# Patient Record
Sex: FEMALE | Race: WHITE | NOT HISPANIC OR LATINO | Employment: OTHER | URBAN - METROPOLITAN AREA
[De-identification: names, ages, dates, MRNs, and addresses within clinical notes are randomized per-mention and may not be internally consistent; named-entity substitution may affect disease eponyms.]

---

## 2016-02-25 LAB
CREAT SERPL-MCNC: 0.9 MG/DL (ref 0.6–1.1)
GFR SERPL CREATININE-BSD FRML MDRD: >60 ML/MIN/1.73M2

## 2016-08-12 LAB
ALT SERPL-CCNC: 28 U/L (ref 7–45)
AST SERPL-CCNC: 40 U/L (ref 8–43)
CREAT SERPL-MCNC: 0.8 MG/DL (ref 0.6–1.1)
GFR SERPL CREATININE-BSD FRML MDRD: >60 ML/MIN/1.73M2
HEP C HIM: NORMAL

## 2017-01-03 ENCOUNTER — TRANSFERRED RECORDS (OUTPATIENT)
Dept: HEALTH INFORMATION MANAGEMENT | Facility: CLINIC | Age: 57
End: 2017-01-03

## 2017-01-04 DIAGNOSIS — J84.9 ILD (INTERSTITIAL LUNG DISEASE) (H): Primary | ICD-10-CM

## 2017-08-31 ENCOUNTER — TRANSFERRED RECORDS (OUTPATIENT)
Dept: HEALTH INFORMATION MANAGEMENT | Facility: CLINIC | Age: 57
End: 2017-08-31

## 2017-08-31 LAB — EJECTION FRACTION: 68

## 2017-09-01 ENCOUNTER — TRANSFERRED RECORDS (OUTPATIENT)
Dept: HEALTH INFORMATION MANAGEMENT | Facility: CLINIC | Age: 57
End: 2017-09-01

## 2017-09-01 LAB
ALT SERPL-CCNC: 20 U/L (ref 7–45)
AST SERPL-CCNC: 28 U/L (ref 8–43)
CREAT SERPL-MCNC: 0.9 MG/DL (ref 0.6–1.1)
GFR SERPL CREATININE-BSD FRML MDRD: >60 ML/MIN/1.73M2
POTASSIUM SERPL-SCNC: 4.7 MMOL/L (ref 3.6–5.2)

## 2017-09-17 ENCOUNTER — TRANSFERRED RECORDS (OUTPATIENT)
Dept: HEALTH INFORMATION MANAGEMENT | Facility: CLINIC | Age: 57
End: 2017-09-17

## 2017-11-07 ENCOUNTER — TRANSFERRED RECORDS (OUTPATIENT)
Dept: HEALTH INFORMATION MANAGEMENT | Facility: CLINIC | Age: 57
End: 2017-11-07

## 2017-11-07 LAB
ALT SERPL-CCNC: 19 U/L (ref 7–45)
AST SERPL-CCNC: 28 U/L (ref 8–43)
CREAT SERPL-MCNC: 0.9 MG/DL (ref 0.6–1.1)
GFR SERPL CREATININE-BSD FRML MDRD: >60 ML/MIN/1.73M2
POTASSIUM SERPL-SCNC: 4.2 MMOL/L (ref 3.6–5.2)

## 2017-11-14 ENCOUNTER — TELEPHONE (OUTPATIENT)
Dept: TRANSPLANT | Facility: CLINIC | Age: 57
End: 2017-11-14

## 2017-11-14 ENCOUNTER — TRANSFERRED RECORDS (OUTPATIENT)
Dept: HEALTH INFORMATION MANAGEMENT | Facility: CLINIC | Age: 57
End: 2017-11-14

## 2017-11-14 NOTE — LETTER
November 14, 2017    Kecia Blue  [address] [place] [address]    Dear Kecia,   Thank you for your interest in the Transplant Center at [facility], [facility].  We look forward to being a part of your care team and assisting you through the transplant process.  As we discussed, your transplant coordinator is Camilla Antoine.  You may call your coordinator at any time with questions or concerns at [phone].  Please complete the following items.    1.  Sign up for:    [employer] (your electronic medical record)    [employer] (the Transplant Center s website- see enclosed booklet for more information)    The above tools can be used to learn more about your transplant, communicate with your care team and track your medical details.    2.  Fill out and return the following enclosed forms:    Transplant application    Authorization for Electronic Communication    Authorization to Discuss Protected Health Information    [employer] Release of Information    Best Wishes,     Solid Organ Transplant Intake  [facility], [facility]    Cc:  Dr. Rosy Mccann

## 2017-11-14 NOTE — LETTER
November 20, 2017    Kecia Blue  [address] [place]  [place] [address]    Fax: [phone]//Attn: Medical Records  Re: Kecia Blue    MRN: [id number]     [facility],   Our mutual patient, Kecia Blue has been referred for a lung transplant at the [facility].  In order to provide the best possible recommendations for this patient, we require some medical records as listed on the attached page.    All information needs to arrive at our facility by 11/23/2017.  If you have a PACS-to PACS connection, we request you push images to the [facility].  Please indicate if you have pushed images to us on your return cover letter.  If unable, scans may be burned onto a CD in DICOM format. All scans may be burned onto a CD in DICOM format.  Pathology slides, if requested,  should be accompanied by the appropriate report from your institution.    Please fax all paper records to [phone].    Please send all scans/slides to:   [facility]  Solid Organ Transplant Office  [address] [place] [address], [address] [address]    Please call our office at [phone] if you have any questions or concerns.  Please call or fax if the patient is hand carrying any information, or if any of the requested information is not at your facility.  Thank you for your assistance in caring for this patient!     Sincerely,   [facility]     Lung Transplant TeamRequest for Records from Primary Care Providers Office for Patients Referred to [facility] Lung Transplant Program  Images Needed to Process Intake of Patient:  o CXR Images (most recent)  o Chest CT Images (all within last 24 months or most recent)  o Heart Cath Images (most recent)  Records Needed to Process Intake of Patient:   o Chest CT Report (all within last 24 months or most recent)  o Chest X-Ray Report (all within last 24  months or most recent)  o Colonoscopy Results with Pathology  o Consulting Physician History and Physical (last 3 reports on file)  o Culture Results (last 2 years on file)  o DEXA Scan Results (most recent on file)  o ECHO Results (most recent on file)  o Hospital Discharge Summaries (last 2 years on file)  o Immunization Records (most recent on file)  o Lab Results (most recent on file)  o Original History and Physical   o Physician Notes (last 3 reports on file)  o Pulmonary Function Testing Results (last 3 reports on file)  o Radiology Reports not including Chest X-rays (last 2 years on file)  o Rheumatology Provider Notes (original and most recent on file)  As applicable:   o Gynecological Exam (most recent on file)  o Mammogram Results (most recent on file)  o Pap Smear Results (most recent on file)  o PSA Lab Results (most recent on file)    Please fax all paper records to [phone].    Please send all scans/slides to:   [facility]  Solid Organ Transplant Office  [address] [place] [id number]  Please call our office at [phone] if you have any questions or concerns.

## 2017-11-14 NOTE — LETTER
November 20, 2017    Kecia Blue  [address] [place]  [place] [address]    Fax: [phone]//Attn: Medical Records  Re: Kecia Blue    MRN: [id number]     [facility],   Our mutual patient, Kecia Blue has been referred for a lung transplant at the [facility].  In order to provide the best possible recommendations for this patient, we require some medical records as listed on the attached page.    All information needs to arrive at our facility by 11/23/2017.  If you have a PACS-to PACS connection, we request you push images to the [facility].  Please indicate if you have pushed images to us on your return cover letter.  If unable, scans may be burned onto a CD in DICOM format. All scans may be burned onto a CD in DICOM format.  Pathology slides, if requested,  should be accompanied by the appropriate report from your institution.    Please fax all paper records to [phone].    Please send all scans/slides to:   [facility]  Solid Organ Transplant Office  [address] [place] [address]    Please call our office at [phone] if you have any questions or concerns.  Please call or fax if the patient is hand carrying any information, or if any of the requested information is not at your facility.  Thank you for your assistance in caring for this patient!     Sincerely,   [facility]     Lung Transplant TeamRequest for Records from Referring Providers Office for Patients Referred to [facility] Lung Transplant Program  Images Needed to Process Intake of Patient:  o CXR Images (most recent)  o Chest CT Images (all within last 24 months or most recent)  o Heart Cath Images (most recent)  Records Needed to Process Intake of Patient:   o Any Alpha 1 Level and Typing available  o Cardiac Catheterization Results (most recent on file)  o Any Cystic Fibrosis  Genotyping available  o Chest CT Report (all within last 24 months or most recent)  o Chest X-Ray Report (all within last 24 months or most recent)  o Culture Results (last 2 years on file)  o ECHO Results (most recent on file)  o Hospital Discharge Summaries (last 2 years on file)  o Lab Results (most recent on file)  o History and Physical (original on file)  o Any Pathology Reports available  o Physicians Notes (last 3 reports on file)  o Pulmonary Function Test Results (last 3 reports on file)  o Pulmonary Testing (last 2 years on file)  o Radiology Reports (not including Chest X-ray, last 2 years on file)  o Rheumatology Notes (original note and most recent note on file)  o Six Minute Walk Results (most recent on file)    Please fax all paper records to [phone].    Please send all scans/slides to:   [facility]  Solid Organ Transplant Office  [address] [place] [address], [address] [address]  Please call our office at [phone] if you have any questions or concerns.

## 2017-11-14 NOTE — LETTER
November 24, 2017    Kecia Blue  [address] [place] [facility] [facility] [address]    Fax: [phone]//Attn: Medical Records- Pathology  Re: Kecia Blue    MRN: [id number]     [facility],   Our mutual patient, Kecia Blue has been referred by NP Elsy Mccann for a lung transplant at the [facility].  In order to provide the best possible recommendations for this patient, we require some medical records as listed below:    Colonoscopy Report from Dec 2012  All information needs to arrive at our facility by 11/27/2017.  If you have a PACS-to PACS connection, we request you push images to the [facility].  Please indicate if you have pushed images to us on your return cover letter.  If unable, scans may be burned onto a CD in DICOM format.  Pathology slides, if requested, should be accompanied by the appropriate report from your institution.    Please fax all paper records to [phone].    Please send all scans/slides to:   [facility]  Solid Organ Transplant Office  [address] [place] [address], [address] [address]    Please call our office at [phone] if you have any questions or concerns.  Please call or fax if the patient is hand carrying any information, or if any of the requested information is not at your facility.  Thank you for your assistance in caring for this patient!     Sincerely,   [facility]   Lung Transplant Team

## 2017-11-14 NOTE — LETTER
November 20, 2017    Elsy Mccann NP  [address] [place] [address]    RE:  Kecia Blue          1960      Dear DOMINIC Mccann,    We are writing to inform you that Kecia Blue has completed the referral process with us to be evaluated for a lung transplant.    Their assigned Transplant Coordinator is Camilla Antoine.  If you should have any questions or concerns, please don t hesitate to contact us.        Thank you,       Solid Organ Transplant Department  [address]  [address], [address] [address]  Phone:  [phone]

## 2017-11-20 VITALS — HEIGHT: 64 IN | BODY MASS INDEX: 19.63 KG/M2 | WEIGHT: 115 LBS

## 2017-11-20 NOTE — TELEPHONE ENCOUNTER
Intake Progress Note    Organ: Lung    Initial Call Made by: Records received from [place]    Referring Physician: DOMINIC Mccann    Assigned Coordinator: Camilla Antoine    Reported Diagnosis: ILD    On Dialysis: No    Dialysis Schedule: N/A    Reported Medical History: ILD. No hospitalizations in the past 12 months per patient.    Records:  I will request.     Preferred Evaluation Start Date:  ASAP     Online Forms    Best time patient can be reached: Anytime. Call her Cell first.    Type of packet sent:  Lung packet has been sent and confirmed patient received it.    Misc. Notes: May speak with emergency contacts listed: Y    Verbal Consent: Y    Insurance: [employer] ID # [id number] (on file in Epic)

## 2017-11-21 NOTE — TELEPHONE ENCOUNTER
SOT LUNG INTAKE    November 21, 2017    Kecia Blue  [id number]  Body mass index is 19.74 kg/(m^2).  Referring Provider: Dr. Dee Watkins (Rheumatology), Dr. Sandra Mclaughlin (Pulmonary Study) Dr. Ra Noyola (Cardiology), Elsy Mccann, NP, Dr. 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    Allergies were updated    Primary Care  Mammogram: [facility]  11/2016  PAP: [facility]  2017   Colonoscopy: [facility]  12/2012  Dental: had a dental visit in the past 1 yr, had braces removed in Oct, no other issues stated   Hepatitis A/B: none  Pneumovax: updated in Epic    Pulmonary Tests   PFTs: [facility]  9/19/17 (pg 22/ 51)  6 Minute Walk: [facility]  9/19/17 as stated   Sleep Study: none    Diagnostic Tests/Imaging  Heart cath: [facility] as noted in [facility] in 3/2013  Stress Test: none  ECHO: [facility]  8/31/17 (pg 38/ 51)    Chest CT: [facility]  9/19/17 (pg 48/ 51)  DEXA: [facility]  2015  Upper GI: none    Notes: Lung Biopsy at [facility] 1/15/16 (pg 46/ 51)    Contacted pt, is part of a research study for PH at [facility] for 16 wks ending in January.  She uses O2 5L continuously, as noted uses a scooter to get around.  As noted, plans to do a repeat CT to rule out infection and discussions on initiating therapy with cyclophosphamide, start pulmonary rehab and monitor weight.

## 2017-12-18 NOTE — TELEPHONE ENCOUNTER
"SOT LUNG INTAKE    December 18, 2017    Kecia lBue  [id number]  Referring Provider: Elsy Mccann, NP, [facility]; Dee Watkins, Rheumatology  Source/Facility: [facility] records, PCP, patient     Diagnosis: Dermatomyositis  57 year old    Height: 5'4\"  Weight: 115 lbs  BMI: 19.74    Social History:    Social History     Social History     Marital status:      Spouse name: N/A     Number of children: N/A     Years of education: N/A     Occupational History     Not on file.     Social History Main Topics     Smoking status: Never Smoker     Smokeless tobacco: Never Used     Alcohol use 0.6 oz/week     1 Glasses of wine per week      Comment: 1 glass 3 times weekly     Drug use: No     Sexual activity: Not on file     Other Topics Concern     Not on file     Social History Narrative       Second-hand Smoke exposure: as a child until age 20    Family History:    Family History   Problem Relation Age of Onset     Hypertension Mother      Arthritis Mother      Pancreatic Cancer Father      DIABETES Father        Past Medical History  Pulmonary Manifestations date: 2014   Details: developed muscle pain/weakness around Sept 2014. Palpable rash and hand cracking at that time, progressive SOB and dry cough a couple months later.  Elevated muscle enzymes and Felisa-1 bette, diagnosed with dermatomyositis.  Started prednisone 50 and tapered, Cellcept to 2 gm daily.  Switched to Azathioprine Feb 2015, up to 150 mg daily.     Diabetes: no  Coronary Artery Disease: unknown  Hypertension: no   Previous transfusion(s): no  History of Cancer: no   GERD: no symptoms  Bleeding Disorders: mother on coumadin for PE over 3 yrs ago    Other Past Medical History:      Past Medical History:   Diagnosis Date     Antisynthetase syndrome (H) 2014     Dermatomyositis (H)      ILD (interstitial lung disease) (H)      Pulmonary hypertension        Surgical History   Lung Biopsy: transbronchial biopsy only: 10/20/14 reviewed by [facility] 1/15/16: " fragments of benign bronchial mucosa and adjacent alveolated lung parenchyma with diffuse interstitial inflammatory infiltrates composed of lymphocytes, neutrophils and a few eosinophils.  Reactive type II pneumocyte hyperplasia also present.    Pneumothorax: no  Chest Surgery: no    Past Surgical History:   Procedure Laterality Date     no prior surgery         Mental Health History  Depression: denies problems, is determined to accept and work with her diagnosis  Anxiety:    Other:      Medications  No current outpatient prescriptions on file.     No current facility-administered medications for this visit.      Clinical trial at [facility]: treprostinil vs placebo  Blood thinner hx: none  Prednisone hx: current  Antibiotic hx:    Narcotic hx: no    Allergies  Review of patient's allergies indicates no known allergies.      Pulmonary Tests and Status  PFT's  Date: 11/07/17    FVC 0.89, 28%   FEV1  0.81, 31%  DLCO  Not obtained  Date: 2/25/2016   FVC 1.49, 46%   FEV1  1.25, 48%  DLCO 29%, TLC 2.12, 44%    6 Minute Walk: 11/07/17: titrated to 6 liters with exertion.  Currently portable is an Inogen with pulsed dose oxygen.  When she goes out to family she brings her primary concentrator along.     Oxygen use   At rest: 4 changed to 5 after testing 11/2017   Sleep: 4   With Activity: 4.5-5 changed to 6 after testing 11/2017   Date of O2 initiation: Aug 2016    Oxygen Company: [employer]   Contact name/number: Manny Sprague    Sleep Study: o/n oximetry baseline 86%.  2/26/16 script for 4 liters with exertion.     BiPAP/CPAP: no     Pulmonary Rehab: starting in January   Date: Jan 2, 2018  Location: [place]      Current activity: minimal    Current activity:  Basic ADLs    Feeding no concerns  Toileting no concerns  Selecting proper attire no concerns  Grooming no concerns  Maintaining continence no concerns  Putting on clothing no concerns  Bathing does OK   Walking & Transferring: extremely difficult with stairs, longer  distance walking.  Mostly stays home. Does not go to basement where laundry is done.  Able to stay on main level of home, bedrooms on main level.    Instrumental ADLs    Managing Finances OK  Handling Transportation able to drive but does very little  Shopping may go along, not usually primary   Preparing meals some  Using telephone & communication devices no concerns  Managing medications no concerns  Housework & basic home maintenance: has       Hospitalizations in prior 12 months  none      Diagnostic Tests/Imaging  Heart cath: 3/2013? RH cath at [facility] (per notes) RA 3, PA 40/13, m25, W 10, [place] 4.9, CI 3.0, PVR 3.06   Stress Test: no   ECHO: 8/31/17: per notes: normal RV size with est RVSP 59. Normal LV size with EF 68%, compared to 8/2016, RVSP has increased    Chest CT: [facility]   9/19/17: new bilateral ggo and interlobular septal thickening could be due to superimposed edema on the underlying fibrotic ILD.  However this could be related to the underlying fibrotic interstitial lung disease and raises the possibility of HP in addition to NSIP and UIP as potential diagnoses.   2/25/16: reticular opacity from upper zone, predominantly in basal zone with ggo, no honeycombing.  C/w  NSIP pattern.  DEXA: no?  Upper GI: not found  Esophagram: 2/26/2016 Normal, esophageal peristalsis is intact, nor hiatal hernia or reflux  PET: 8/15/16--not done at [facility]: axillary and mediastinal venessa uptake features probably reactive.  Bilateral lung uptake features probably from chronic lung interstitial disease.    Primary Care  Health Maintenance   Topic Date Due     TETANUS IMMUNIZATION (SYSTEM ASSIGNED)  12/10/1980     HEPATITIS C SCREENING  12/10/1980     PAP SCREENING Q3 YR (SYSTEM ASSIGNED)  12/10/1983     LIPID SCREEN Q5 YR FEMALE (SYSTEM ASSIGNED)  12/10/2007     MAMMO SCREEN Q2 YR (SYSTEM ASSIGNED)  12/10/2012     COLON CANCER SCREEN (SYSTEM ASSIGNED)  12/10/2012     INFLUENZA VACCINE (SYSTEM ASSIGNED)   09/01/2017     ADVANCE DIRECTIVE PLANNING Q5 YRS  12/10/2017     Mammogram: 10/26/15: new small asymmetry in inner right breast middle depth.  Further diagnostics recommended. Further diagnostics: no radiographic evidence of malignancy.  11/29/16: breast parenchyma is heterogeneously dense.  No evidence of malignancy.  PAP: 5/15/15: dysplasia of cervix, low grade, ESTRADA-1  Colonoscopy: 12/22/12 colonoscopy negative  Dental: within year  Hepatitis A/B: no  Pneumovax: 2/24/16    Labs:    NT proBNP: 9/01/17 1351  A1AT: not found  Rheumatology: 8/22/16 notes: ESR 99,  (607), , Aldolase 13.2, CRP 35.8, BENJAMÍN, SSA, Felisa-1 all positive.   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  Will order flow loop.  6 mw will be done on Tuesday 1/02 with pulmonary rehab intake visit

## 2017-12-21 DIAGNOSIS — J84.9 ILD (INTERSTITIAL LUNG DISEASE) (H): Primary | ICD-10-CM

## 2017-12-21 NOTE — Clinical Note
Please schedule NPT with Dr Hood on 1/05 at 8:40 am (80 minutes) with spirometry prior to appt.  Appt. Held. Patient aware.  Need new patient letter.

## 2017-12-28 RX ORDER — NYSTATIN 100000/ML
100000 SUSPENSION, ORAL (FINAL DOSE FORM) ORAL 4 TIMES DAILY
Status: ON HOLD | COMMUNITY
End: 2018-03-27

## 2017-12-28 RX ORDER — CALCIUM CARBONATE 500(1250)
1 TABLET ORAL 2 TIMES DAILY
Status: ON HOLD | COMMUNITY
End: 2018-03-27

## 2017-12-28 RX ORDER — SULFAMETHOXAZOLE/TRIMETHOPRIM 800-160 MG
1 TABLET ORAL
Status: ON HOLD | COMMUNITY
End: 2018-03-23

## 2017-12-28 RX ORDER — MULTIPLE VITAMINS W/ MINERALS TAB 9MG-400MCG
1 TAB ORAL DAILY
Status: ON HOLD | COMMUNITY
End: 2018-03-27

## 2018-01-05 ENCOUNTER — OFFICE VISIT (OUTPATIENT)
Dept: TRANSPLANT | Facility: CLINIC | Age: 58
End: 2018-01-05
Attending: INTERNAL MEDICINE
Payer: COMMERCIAL

## 2018-01-05 VITALS
WEIGHT: 120.3 LBS | DIASTOLIC BLOOD PRESSURE: 79 MMHG | OXYGEN SATURATION: 93 % | HEIGHT: 64 IN | BODY MASS INDEX: 20.54 KG/M2 | HEART RATE: 95 BPM | RESPIRATION RATE: 22 BRPM | TEMPERATURE: 97.8 F | SYSTOLIC BLOOD PRESSURE: 119 MMHG

## 2018-01-05 DIAGNOSIS — J84.9 ILD (INTERSTITIAL LUNG DISEASE) (H): Primary | ICD-10-CM

## 2018-01-05 DIAGNOSIS — J84.9 ILD (INTERSTITIAL LUNG DISEASE) (H): ICD-10-CM

## 2018-01-05 DIAGNOSIS — R59.1 LA (LYMPHADENOPATHY): ICD-10-CM

## 2018-01-05 LAB
6 MIN WALK (FT): 850 FT
6 MIN WALK (M): 259 M

## 2018-01-05 PROCEDURE — G0463 HOSPITAL OUTPT CLINIC VISIT: HCPCS | Mod: ZF

## 2018-01-05 ASSESSMENT — PAIN SCALES - GENERAL: PAINLEVEL: NO PAIN (0)

## 2018-01-05 NOTE — PATIENT INSTRUCTIONS
"Pulmonary Rehab:  Proceed when scheduled    Oxygen use:  5 liters continuous      5' 4\" 120 lbs 4.8 oz Body mass index is 20.65 kg/(m^2).  Goal BMI greater than 18, less than 30 to be eligible for lung transplant    Medication changes: increase prednisone to 10 mg daily    Follow-up plans: a PET scan will be arranged for you closer to home.  Tentative plan will be to proceed with a transplant evaluation following that.   6 minute walk today, will modify your oxygen prescription if needed    Thank-you for allowing us to participate in your care.    If your condition should change please contact your transplant coordinator.     Thoracic Transplant Office phone [phone], fax [phone]  Office Hours 8:30 - 5:00       "

## 2018-01-05 NOTE — MR AVS SNAPSHOT
"              After Visit Summary   1/5/2018    Kecia Blue    MRN: [id number]           Patient Information     Date Of Birth          1960        Visit Information        Provider Department      1/5/2018 8:40 AM Nicho Hood MD [facility] Solid Organ Transplant        Today's Diagnoses     ILD (interstitial lung disease) (H)    -  1    LA (lymphadenopathy)          Care Instructions    Pulmonary Rehab:  Proceed when scheduled    Oxygen use:  5 liters continuous      5' 4\" 120 lbs 4.8 oz Body mass index is 20.65 kg/(m^2).  Goal BMI greater than 18, less than 30 to be eligible for lung transplant    Medication changes: increase prednisone to 10 mg daily    Follow-up plans: a PET scan will be arranged for you closer to home.  Tentative plan will be to proceed with a transplant evaluation following that.   6 minute walk today, will modify your oxygen prescription if needed    Thank-you for allowing us to participate in your care.    If your condition should change please contact your transplant coordinator.     Thoracic Transplant Office phone [phone], fax [phone]  Office Hours 8:30 - 5:00               Follow-ups after your visit        Additional Services     CARDIOLOGY EVAL ADULT REFERRAL       Your provider has referred you to:  [facility]: [facility] [phone]   [email or web address]    Please be aware that coverage of these services is subject to the terms and limitations of your health insurance plan.  Call member services at your health plan with any benefit or coverage questions.      Type of Referral:  New Cardiology Consult    Timeframe requested:  4 weeks    Please bring the following to your appointment:  >>   Any x-rays, CTs or MRIs which have been performed.  Contact the facility where they were done to arrange for  prior to your scheduled appointment.    >>   List of current " "medications  >>   This referral request   >>   Any documents/labs given to you for this referral                  Your next 10 appointments already scheduled     Jan 05, 2018 10:00 AM CST   Six Minute Walk with UC PFL 6 MINUTE WALK 1   [facility] Pulmonary Function Testing ([facility])    [address] [id number] [phone]              Future tests that were ordered for you today     Open Future Orders        Priority Expected Expires Ordered    General PFT Lab (Please always keep checked) Routine  1/5/2019 1/5/2018    6 minute walk test Routine  1/5/2019 1/5/2018    PET Brain Routine  1/5/2019 1/5/2018            Who to contact     If you have questions or need follow up information about today's clinic visit or your schedule please contact [facility] SOLID ORGAN TRANSPLANT directly at [phone].  Normal or non-critical lab and imaging results will be communicated to you by [employer], letter or phone within 4 business days after the clinic has received the results. If you do not hear from us within 7 days, please contact the clinic through [employer] or phone. If you have a critical or abnormal lab result, we will notify you by phone as soon as possible.  Submit refill requests through [employer] [facility] or call your pharmacy and they will forward the refill request to us. Please allow 3 business days for your refill to be completed.          Additional Information About Your Visit        [employer] Information     [employer] [facility] lets you send messages to your doctor, view your test results, renew your prescriptions, schedule appointments and more. To sign up, go to [email or web address] . Click on \"Log in\" on the left side of the screen, which will take you to the Welcome page. Then click on \"Sign up Now\" on the right side of the page.     You will be asked to enter the access code listed below, as well as some personal information. Please follow the directions to create " "your username and password.     Your access code is: [email or web address]  Expires: 2018  9:48 AM     Your access code will  in 90 days. If you need help or a new code, please call your [facility] or [phone].        Care EveryWhere ID     This is your Care EveryWhere ID. This could be used by other organizations to access your [facility] medical records  [id number]        Your Vitals Were     Pulse Temperature Respirations Height Pulse Oximetry BMI (Body Mass Index)    95 97.8  F (36.6  C) (Oral) 22 1.626 m (5' 4\") 93% 20.65 kg/m2       Blood Pressure from Last 3 Encounters:   18 119/79    Weight from Last 3 Encounters:   18 54.6 kg (120 lb 4.8 oz)   17 52.2 kg (115 lb)              We Performed the Following     CARDIOLOGY EVAL ADULT REFERRAL        Primary Care Provider Office Phone # Fax #    Rosy uV [phone]       [facility]  [address]  [facility] [address]        Equal Access to Services     [facility]: Hadii aad ku hadasho Soomaali, waaxda luqadaha, qaybta kaalmada adeegyada, waxay idiin hayaan adeeg jordi la'aan . So [facility] [phone].    ATENCIÓN: Si habla español, tiene a taylor disposición servicios gratuitos de asistencia lingüística. [facility] [phone].    We comply with applicable federal civil rights laws and [place] laws. We do not discriminate on the basis of race, color, national origin, age, disability, sex, sexual orientation, or gender identity.            Thank you!     Thank you for choosing [facility] ORGAN TRANSPLANT  for your care. Our goal is always to provide you with excellent care. Hearing back from our patients is one way we can continue to improve our services. Please take a few minutes to complete the written survey that you may receive in the mail after your visit with us. Thank you!             Your Updated Medication List - Protect others around you: Learn how to safely use, store and throw away " your medicines at www.disposemymeds.org.          This list is accurate as of: 1/5/18  9:58 AM.  Always use your most recent med list.                   Brand Name Dispense Instructions for use Diagnosis    AZATHIOPRINE PO      Take 50 mg by mouth 2 times daily 50 mg tablet, 1.5 tablets by mouth 2 times a day        calcium carbonate 1250 MG tablet    OS-CHELSEY 500 mg [place]. Ca     Take 1 tablet by mouth 2 times daily        multivitamin, therapeutic with minerals Tabs tablet      Take 1 tablet by mouth daily        nystatin 029806 UNIT/ML suspension    MYCOSTATIN     Take 100,000 Units by mouth 4 times daily 100,000 unit/ ml, take 4- 6 ml by mouth 4 times a day.        PREDNISONE PO      Take 5 mg by mouth daily        sulfamethoxazole-trimethoprim 800-160 MG per tablet    BACTRIM DS/SEPTRA DS     Take 1 tablet by mouth Every Mon, Wed, Fri Morning        TACROLIMUS PO      Take 0.1 mg by mouth 2 times daily 0.1 mg capsule, take 4 capsules by mouth 2 times a day.

## 2018-01-05 NOTE — LETTER
1/5/2018       RE: Kecia Blue  [address] [place] [address]     Dear Colleague,    Thank you for referring your patient, Kecia Blue, to the [facility] at [facility]. Please see a copy of my visit note below.          [facility]   Pulmonary Clinic Follow Up Note  January 5, 2018      Kecia Blue MRN# [id number]   Age: 57 year old YOB: 1960     Date of Consultation: January 5, 2018    Primary care provider: Rosy Vu     History taken from; Patient       Kecia Blue is a 57 year old female seen in the Pulmonary Clinic  for f/u.  Chief Complaint   Patient presents with     Transplant Referral     Lung   .          Pulmonary Problem List / Reason for follow up:   1. ILD           Assessment and Plan:   ASSESSMENT AND PLAN:  The patient is a 57-year-old lady with a history of interstitial lung disease associated with dermatomyositis.  The patient is currently on tacrolimus, Imuran and prednisone.  The patient has very advanced and progressive disease.  The patient's pulmonary function tests are worse in comparison to the one that was done 1 year ago; however, they are better than the one that was done in November.   1.  Interstitial lung disease.  The patient will continue with tacrolimus, Imuran and prednisone.  We will suggest for the patient to be on the dose of 10 mg of the prednisone.  The patient also needs to be on PCP prophylaxis with Bactrim.  The patient will go through pulmonary rehab.  Today we will do a    6-minute walk test for the patient to assess the patient's oxygen needs.  We will also refer the patient to the pulmonary hypertension specialist in our clinic.   2.  Lung transplantation.  The patient will have a PET scan prior to the evaluation because the patient had a PET scan 1 year ago which showed some lymphadenopathy in the mediastinum.   The patient has dermatomyositis and the patient has increased risk of malignancy.  We discussed with the patient evaluation and listing process as well as lung allocation score.  We also discussed with the patient dry runs and high-risk donors.  We discussed with the patient early and late complications of lung transplant.  We also discussed with the patient survival statistics.  The patient expressed understanding and agreed with the plan.  We will await results of the PET scan and then we will do the evaluation for the patient.      We explained the plan to the patient.  The patient expressed understanding and agreed with the plan.      Thank you very much for the opportunity to participate in the care of this very pleasant patient.           Return visit in 2 months  Nicho Hood M.D.         History of Present Illness / Interval History:   CHIEF COMPLAINT:  Dermatomyositis associated with interstitial lung disease.      HISTORY OF PRESENT ILLNESS:  The patient was diagnosed with dermatomyositis in 2014.  The patient was diagnosed with interstitial lung disease in 2015.  Currently the patient is short of breath with any activity.  Besides severe interstitial lung disease, the patient is diagnosed with pulmonary hypertension.  The patient has severe pulmonary hypertension.  The patient denies any other medical problems.        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  The patient is never a smoker.  The patient denies any toxins.  The patient is drinking alcohol occasionally.  The patient does not have pets and does not have a hot tub.        FAMILY HISTORY:  Positive for hypertension, pancreatic cancer and diabetes mellitus.            Pulmonary Data:   Exposure history: Asbestos;  No , TB;  No , Radiation;   No          Medications:     Current Outpatient Prescriptions   Medication Sig     AZATHIOPRINE PO Take 50 mg by mouth 2 times daily 50 mg tablet, 1.5  tablets by mouth 2 times a day     calcium carbonate (OS-CHELSEY 500 MG [place]. CA) 1250 MG tablet Take 1 tablet by mouth 2 times daily     multivitamin, therapeutic with minerals (THERA-VIT-M) TABS tablet Take 1 tablet by mouth daily     nystatin (MYCOSTATIN) 582315 UNIT/ML suspension Take 100,000 Units by mouth 4 times daily 100,000 unit/ ml, take 4- 6 ml by mouth 4 times a day.     PREDNISONE PO Take 5 mg by mouth daily     sulfamethoxazole-trimethoprim (BACTRIM DS/SEPTRA DS) 800-160 MG per tablet Take 1 tablet by mouth Every Mon, Wed, Fri Morning     TACROLIMUS PO Take 0.1 mg by mouth 2 times daily 0.1 mg capsule, take 4 capsules by mouth 2 times a day.     No current facility-administered medications for this visit.              Past Medical History:     Past Medical History:   Diagnosis Date     Antisynthetase syndrome (H) 2014     Dermatomyositis (H)      ILD (interstitial lung disease) (H)      Pulmonary hypertension              Past Surgical History:     Past Surgical History:   Procedure Laterality Date     no prior surgery               Social History:     Social History     Social History     Marital status:      Spouse name: N/A     Number of children: N/A     Years of education: N/A     Occupational History     Not on file.     Social History Main Topics     Smoking status: Never Smoker     Smokeless tobacco: Never Used     Alcohol use 0.6 oz/week     1 Glasses of wine per week      Comment: 1 glass 3 times weekly     Drug use: No     Sexual activity: Not on file     Other Topics Concern     Not on file     Social History Narrative             Family History:     Family History   Problem Relation Age of Onset     Hypertension Mother      Arthritis Mother      Pancreatic Cancer Father      DIABETES Father              Immunization:     There is no immunization history on file for this patient.           Review of Systems:   I have done 10 points of review systems and pertinent findings are as above ,  otherwise negative.             Physical Examination:   General: Alert, oriented, not in distress  B/P: 119/79, T: 97.8, P: 95, R: 22  HEENT: neck supple, symmetrical, no lymph node enlargement, thyromegaly, bruit, JVD, pupils are isocoric and equally responsive to the light.  Lungs: both hemithoraces are symmetrical, normal to palpation, no dullness to percussion, auscultation of lungs revealed bibasilar crackles  CVS: Normal S1 and S2, no additional heart sounds, murmur, rub, normal peripheral pulses  Abdomen: Bowel sounds present, soft, non tender, non distended, no organomegaly, ascitis, mass  Extremities/musculoskeletal: no peripheral edema, deformity, cyanosis, clubbing  Neurology: alert, orientedx3, no motor/sensorial deficits, cranial nerves grossly normal  Skin: no rash  Psychiatry: Mood and affect are appropriate             DATA:       PFT   PFT Latest Ref Rng & Units 1/5/2018   FVC L 1.17   FEV1 L 0.92   FVC% % 35   FEV1% % 35     Thank you for allowing me participate in the care of Kecia Blue.    Nicho Hood M.D.  Associate Professor of Medicine  Pulmonary, Allergy, Critical Care and Sleep

## 2018-01-05 NOTE — LETTER
1/5/2018      RE: Kecia Blue  [address] [place] [address]             [facility]   Pulmonary Clinic Follow Up Note  January 5, 2018      Kecia Blue MRN# [id number]   Age: 57 year old YOB: 1960     Date of Consultation: January 5, 2018    Primary care provider: Rosy Vu     History taken from; Patient       Kecia Blue is a 57 year old female seen in the Pulmonary Clinic  for f/u.  Chief Complaint   Patient presents with     Transplant Referral     Lung   .          Pulmonary Problem List / Reason for follow up:   1. ILD         Assessment and Plan:   ASSESSMENT AND PLAN:  The patient is a 57-year-old lady with a history of interstitial lung disease associated with dermatomyositis.  The patient is currently on tacrolimus, Imuran and prednisone.  The patient has very advanced and progressive disease.  The patient's pulmonary function tests are worse in comparison to the one that was done 1 year ago; however, they are better than the one that was done in November.   1.  Interstitial lung disease.  The patient will continue with tacrolimus, Imuran and prednisone.  We will suggest for the patient to be on the dose of 10 mg of the prednisone.  The patient also needs to be on PCP prophylaxis with Bactrim.  The patient will go through pulmonary rehab.  Today we will do a    6-minute walk test for the patient to assess the patient's oxygen needs.  We will also refer the patient to the pulmonary hypertension specialist in our clinic.   2.  Lung transplantation.  The patient will have a PET scan prior to the evaluation because the patient had a PET scan 1 year ago which showed some lymphadenopathy in the mediastinum.  The patient has dermatomyositis and the patient has increased risk of malignancy.  We discussed with the patient evaluation and listing process as well as lung allocation score.  We also discussed with the  patient dry runs and high-risk donors.  We discussed with the patient early and late complications of lung transplant.  We also discussed with the patient survival statistics.  The patient expressed understanding and agreed with the plan.  We will await results of the PET scan and then we will do the evaluation for the patient.      We explained the plan to the patient.  The patient expressed understanding and agreed with the plan.      Thank you very much for the opportunity to participate in the care of this very pleasant patient.   Return visit in 2 months      Nicho Hood M.D.         History of Present Illness / Interval History:   CHIEF COMPLAINT:  Dermatomyositis associated with interstitial lung disease.      HISTORY OF PRESENT ILLNESS:  The patient was diagnosed with dermatomyositis in 2014.  The patient was diagnosed with interstitial lung disease in 2015.  Currently the patient is short of breath with any activity.  Besides severe interstitial lung disease, the patient is diagnosed with pulmonary hypertension.  The patient has severe pulmonary hypertension.  The patient denies any other medical problems.        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  The patient is never a smoker.  The patient denies any toxins.  The patient is drinking alcohol occasionally.  The patient does not have pets and does not have a hot tub.        FAMILY HISTORY:  Positive for hypertension, pancreatic cancer and diabetes mellitus.          Pulmonary Data:   Exposure history: Asbestos;  No , TB;  No , Radiation;   No          Medications:     Current Outpatient Prescriptions   Medication Sig     AZATHIOPRINE PO Take 50 mg by mouth 2 times daily 50 mg tablet, 1.5 tablets by mouth 2 times a day     calcium carbonate (OS-CHELSEY 500 MG [place]. [place]) 1250 MG tablet Take 1 tablet by mouth 2 times daily     multivitamin, therapeutic with minerals (THERA-VIT-M) TABS tablet Take 1  tablet by mouth daily     nystatin (MYCOSTATIN) 631581 UNIT/ML suspension Take 100,000 Units by mouth 4 times daily 100,000 unit/ ml, take 4- 6 ml by mouth 4 times a day.     PREDNISONE PO Take 5 mg by mouth daily     sulfamethoxazole-trimethoprim (BACTRIM DS/SEPTRA DS) 800-160 MG per tablet Take 1 tablet by mouth Every Mon, Wed, Fri Morning     TACROLIMUS PO Take 0.1 mg by mouth 2 times daily 0.1 mg capsule, take 4 capsules by mouth 2 times a day.     No current facility-administered medications for this visit.              Past Medical History:     Past Medical History:   Diagnosis Date     Antisynthetase syndrome (H) 2014     Dermatomyositis (H)      ILD (interstitial lung disease) (H)      Pulmonary hypertension              Past Surgical History:     Past Surgical History:   Procedure Laterality Date     no prior surgery               Social History:     Social History     Social History     Marital status:      Spouse name: N/A     Number of children: N/A     Years of education: N/A     Occupational History     Not on file.     Social History Main Topics     Smoking status: Never Smoker     Smokeless tobacco: Never Used     Alcohol use 0.6 oz/week     1 Glasses of wine per week      Comment: 1 glass 3 times weekly     Drug use: No     Sexual activity: Not on file     Other Topics Concern     Not on file     Social History Narrative             Family History:     Family History   Problem Relation Age of Onset     Hypertension Mother      Arthritis Mother      Pancreatic Cancer Father      DIABETES Father              Immunization:   There is no immunization history on file for this patient.         Review of Systems:   I have done 10 points of review systems and pertinent findings are as above , otherwise negative.         Physical Examination:   General: Alert, oriented, not in distress  B/P: 119/79, T: 97.8, P: 95, R: 22  HEENT: neck supple, symmetrical, no lymph node enlargement, thyromegaly, bruit,  JVD, pupils are isocoric and equally responsive to the light.  Lungs: both hemithoraces are symmetrical, normal to palpation, no dullness to percussion, auscultation of lungs revealed bibasilar crackles  CVS: Normal S1 and S2, no additional heart sounds, murmur, rub, normal peripheral pulses  Abdomen: Bowel sounds present, soft, non tender, non distended, no organomegaly, ascitis, mass  Extremities/musculoskeletal: no peripheral edema, deformity, cyanosis, clubbing  Neurology: alert, orientedx3, no motor/sensorial deficits, cranial nerves grossly normal  Skin: no rash  Psychiatry: Mood and affect are appropriate          DATA:   PFT   PFT Latest Ref Rng & Units 1/5/2018   FVC L 1.17   FEV1 L 0.92   FVC% % 35   FEV1% % 35       Thank you for allowing me participate in the care of Kecia Blue.      Nicho Hood M.D.  Associate Professor of Medicine  Pulmonary, Allergy, Critical Care and Sleep

## 2018-01-05 NOTE — PROGRESS NOTES
[facility] Clinic Follow Up Note  January 5, 2018      Kecia Blue MRN# [id number]   Age: 57 year old YOB: 1960     Date of Consultation: January 5, 2018    Primary care provider: Rosy Vu     History taken from; Patient       Kecia Blue is a 57 year old female seen in the Pulmonary Clinic  for f/u.  Chief Complaint   Patient presents with     Transplant Referral     Lung   .          Pulmonary Problem List / Reason for follow up:   1. ILD           Assessment and Plan:        ASSESSMENT AND PLAN:  The patient is a 57-year-old lady with a history of interstitial lung disease associated with dermatomyositis.  The patient is currently on tacrolimus, Imuran and prednisone.  The patient has very advanced and progressive disease.  The patient's pulmonary function tests are worse in comparison to the one that was done 1 year ago; however, they are better than the one that was done in November.   1.  Interstitial lung disease.  The patient will continue with tacrolimus, Imuran and prednisone.  We will suggest for the patient to be on the dose of 10 mg of the prednisone.  The patient also needs to be on PCP prophylaxis with Bactrim.  The patient will go through pulmonary rehab.  Today we will do a    6-minute walk test for the patient to assess the patient's oxygen needs.  We will also refer the patient to the pulmonary hypertension specialist in our clinic.   2.  Lung transplantation.  The patient will have a PET scan prior to the evaluation because the patient had a PET scan 1 year ago which showed some lymphadenopathy in the mediastinum.  The patient has dermatomyositis and the patient has increased risk of malignancy.  We discussed with the patient evaluation and listing process as well as lung allocation score.  We also discussed with the patient dry runs and high-risk donors.  We discussed with the patient early and late  complications of lung transplant.  We also discussed with the patient survival statistics.  The patient expressed understanding and agreed with the plan.  We will await results of the PET scan and then we will do the evaluation for the patient.      We explained the plan to the patient.  The patient expressed understanding and agreed with the plan.      Thank you very much for the opportunity to participate in the care of this very pleasant patient.           Return visit in 2 months      Nicho Hood M.D.         History of Present Illness / Interval History:     CHIEF COMPLAINT:  Dermatomyositis associated with interstitial lung disease.      HISTORY OF PRESENT ILLNESS:  The patient was diagnosed with dermatomyositis in 2014.  The patient was diagnosed with interstitial lung disease in 2015.  Currently the patient is short of breath with any activity.  Besides severe interstitial lung disease, the patient is diagnosed with pulmonary hypertension.  The patient has severe pulmonary hypertension.  The patient denies any other medical problems.        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  The patient is never a smoker.  The patient denies any toxins.  The patient is drinking alcohol occasionally.  The patient does not have pets and does not have a hot tub.        FAMILY HISTORY:  Positive for hypertension, pancreatic cancer and diabetes mellitus.                 Pulmonary Data:       Exposure history: Asbestos;  No , TB;  No , Radiation;   No          Medications:     Current Outpatient Prescriptions   Medication Sig     AZATHIOPRINE PO Take 50 mg by mouth 2 times daily 50 mg tablet, 1.5 tablets by mouth 2 times a day     calcium carbonate (OS-CHELSEY 500 MG [place]. [place]) 1250 MG tablet Take 1 tablet by mouth 2 times daily     multivitamin, therapeutic with minerals (THERA-VIT-M) TABS tablet Take 1 tablet by mouth daily     nystatin (MYCOSTATIN) 884719 UNIT/ML  suspension Take 100,000 Units by mouth 4 times daily 100,000 unit/ ml, take 4- 6 ml by mouth 4 times a day.     PREDNISONE PO Take 5 mg by mouth daily     sulfamethoxazole-trimethoprim (BACTRIM DS/SEPTRA DS) 800-160 MG per tablet Take 1 tablet by mouth Every Mon, Wed, Fri Morning     TACROLIMUS PO Take 0.1 mg by mouth 2 times daily 0.1 mg capsule, take 4 capsules by mouth 2 times a day.     No current facility-administered medications for this visit.              Past Medical History:     Past Medical History:   Diagnosis Date     Antisynthetase syndrome (H) 2014     Dermatomyositis (H)      ILD (interstitial lung disease) (H)      Pulmonary hypertension              Past Surgical History:     Past Surgical History:   Procedure Laterality Date     no prior surgery               Social History:     Social History     Social History     Marital status:      Spouse name: N/A     Number of children: N/A     Years of education: N/A     Occupational History     Not on file.     Social History Main Topics     Smoking status: Never Smoker     Smokeless tobacco: Never Used     Alcohol use 0.6 oz/week     1 Glasses of wine per week      Comment: 1 glass 3 times weekly     Drug use: No     Sexual activity: Not on file     Other Topics Concern     Not on file     Social History Narrative             Family History:     Family History   Problem Relation Age of Onset     Hypertension Mother      Arthritis Mother      Pancreatic Cancer Father      DIABETES Father              Immunization:     There is no immunization history on file for this patient.           Review of Systems:   I have done 10 points of review systems and pertinent findings are as above , otherwise negative.             Physical Examination:   General: Alert, oriented, not in distress  B/P: 119/79, T: 97.8, P: 95, R: 22  HEENT: neck supple, symmetrical, no lymph node enlargement, thyromegaly, bruit, JVD, pupils are isocoric and equally responsive to the  light.  Lungs: both hemithoraces are symmetrical, normal to palpation, no dullness to percussion, auscultation of lungs revealed bibasilar crackles  CVS: Normal S1 and S2, no additional heart sounds, murmur, rub, normal peripheral pulses  Abdomen: Bowel sounds present, soft, non tender, non distended, no organomegaly, ascitis, mass  Extremities/musculoskeletal: no peripheral edema, deformity, cyanosis, clubbing  Neurology: alert, orientedx3, no motor/sensorial deficits, cranial nerves grossly normal  Skin: no rash  Psychiatry: Mood and affect are appropriate             DATA:       PFT   PFT Latest Ref Rng & Units 1/5/2018   FVC L 1.17   FEV1 L 0.92   FVC% % 35   FEV1% % 35             Thank you for allowing me participate in the care of Kecia BRODERICK Blue.      Nicho Hood M.D.  Associate Professor of Medicine  Pulmonary, Allergy, Critical Care and Sleep

## 2018-01-08 ENCOUNTER — TELEPHONE (OUTPATIENT)
Dept: TRANSPLANT | Facility: CLINIC | Age: 58
End: 2018-01-08

## 2018-01-08 DIAGNOSIS — R59.0 LYMPHADENOPATHY, MEDIASTINAL: ICD-10-CM

## 2018-01-08 DIAGNOSIS — R09.02 HYPOXIA: ICD-10-CM

## 2018-01-08 DIAGNOSIS — M33.91: ICD-10-CM

## 2018-01-08 DIAGNOSIS — J84.9 ILD (INTERSTITIAL LUNG DISEASE) (H): Primary | ICD-10-CM

## 2018-01-09 LAB
DLCOUNC-%PRED-PRE: 27 %
DLCOUNC-PRE: 6.09 ML/MIN/MMHG
DLCOUNC-PRED: 21.97 ML/MIN/MMHG
ERV-%PRED-PRE: 45 %
ERV-PRE: 0.48 L
ERV-PRED: 1.06 L
EXPTIME-PRE: 6.11 SEC
FEF2575-%PRED-PRE: 32 %
FEF2575-PRE: 0.81 L/SEC
FEF2575-PRED: 2.47 L/SEC
FEFMAX-%PRED-PRE: 53 %
FEFMAX-PRE: 3.45 L/SEC
FEFMAX-PRED: 6.46 L/SEC
FEV1-%PRED-PRE: 35 %
FEV1-PRE: 0.92 L
FEV1FEV6-PRE: 79 %
FEV1FEV6-PRED: 81 %
FEV1FVC-PRE: 79 %
FEV1FVC-PRED: 80 %
FEV1SVC-PRE: 61 %
FEV1SVC-PRED: 78 %
FIFMAX-PRE: 3.46 L/SEC
FIO2-PRE: 80 %
FRCPLETH-%PRED-PRE: 57 %
FRCPLETH-PRE: 1.53 L
FRCPLETH-PRED: 2.67 L
FVC-%PRED-PRE: 35 %
FVC-PRE: 1.17 L
FVC-PRED: 3.26 L
IC-%PRED-PRE: 46 %
IC-PRE: 1.04 L
IC-PRED: 2.25 L
RVPLETH-%PRED-PRE: 58 %
RVPLETH-PRE: 1.05 L
RVPLETH-PRED: 1.8 L
TLCPLETH-%PRED-PRE: 52 %
TLCPLETH-PRE: 2.57 L
TLCPLETH-PRED: 4.86 L
VA-%PRED-PRE: 54 %
VA-PRE: 2.7 L
VC-%PRED-PRE: 45 %
VC-PRE: 1.51 L
VC-PRED: 3.31 L

## 2018-01-12 NOTE — TELEPHONE ENCOUNTER
"Have completed faxing updated orders for oxygen to [facility] in [place].    Contacted PA department for auth for PET scan.  Order completed and faxed to [facility].  PA information included.  Contacted Kecia to confirm that order has been sent to [facility] and they should contact her.  Also confirmed that approval has been obtained to proceed with evaluation when appropriate.     Kecia was seen at [place].  She was on Tyvaso vs placebo clinical trial and has now been switched to Tyvaso on Wednesday.  She is fairly sure that she was on placebo based on how it \"tastes\".  She is ramping the dosing up as was instructed.  She is a list of potential side effects for which she should call the study center and will monitor closely.   "

## 2018-01-16 ENCOUNTER — TRANSFERRED RECORDS (OUTPATIENT)
Dept: HEALTH INFORMATION MANAGEMENT | Facility: CLINIC | Age: 58
End: 2018-01-16

## 2018-01-29 ENCOUNTER — HOSPITAL ENCOUNTER (OUTPATIENT)
Facility: CLINIC | Age: 58
End: 2018-01-29
Attending: INTERNAL MEDICINE | Admitting: INTERNAL MEDICINE

## 2018-01-29 DIAGNOSIS — R06.02 SHORTNESS OF BREATH: Primary | ICD-10-CM

## 2018-01-29 DIAGNOSIS — J84.9 LUNG DISEASE, INTERSTITIAL (H): ICD-10-CM

## 2018-01-29 DIAGNOSIS — R93.89 ABNORMAL CHEST CT: ICD-10-CM

## 2018-01-29 DIAGNOSIS — Z76.82 ORGAN TRANSPLANT CANDIDATE: ICD-10-CM

## 2018-01-29 NOTE — Clinical Note
Hi, Per further discussion with Dr. Hood, just added a hi res CT chest to orders for eval, no contrast. Thanks, Camilla

## 2018-01-29 NOTE — PROGRESS NOTES
Obtained results of CT PET done at [facility], images pushed and received.  Reviewed report and images with PET radiologist at [facility].  Report states increased FDG uptake at left base of tongue, soft palate, and right aspect of the thyroid cartilage with no associated mass noted on CT, possibly physiologic.  Radiologist thought looked physiologic, low likelihood of problem, but ENT could assess with direct visualization.   Per review with Dr. Hood, will proceed with lung transplant evaluation and include an ENT consult.    Discussed with Kecia and confirmed plan for evaluation the week of 2/19.  Orders completed and sent to .

## 2018-01-29 NOTE — Clinical Note
Orders for evaluation the week of 2/19 with Dr. Hood.  No MD visit for now.  No CT.  Added an ENT referral with explanation.  Some mildly abnormal findings on outside CT PET, will scan in report.  Images here.   Let me know if questions.

## 2018-02-13 NOTE — TELEPHONE ENCOUNTER
APPT INFO    Date /Time: 2/20/18, 3pm   Reason for Appt: Lung Eval   Ref Provider/Clinic: KENDRA ALONZO   Are there internal records? Yes/No?  IF YES, list clinic names: No    Are there outside records? Yes/No? Yes    Patient Contact (Y/N) & Call Details: No, referred    Action:      OUTSIDE RECORDS CHECKLIST     CLINIC NAME COMMENTS REC (x) IMG (x)   [facility] Records scanned in epic.

## 2018-02-19 ENCOUNTER — RESULTS ONLY (OUTPATIENT)
Dept: OTHER | Facility: CLINIC | Age: 58
End: 2018-02-19

## 2018-02-19 ENCOUNTER — RADIANT APPOINTMENT (OUTPATIENT)
Dept: GENERAL RADIOLOGY | Facility: CLINIC | Age: 58
End: 2018-02-19
Attending: INTERNAL MEDICINE
Payer: COMMERCIAL

## 2018-02-19 ENCOUNTER — RADIANT APPOINTMENT (OUTPATIENT)
Dept: BONE DENSITY | Facility: CLINIC | Age: 58
End: 2018-02-19
Attending: INTERNAL MEDICINE
Payer: COMMERCIAL

## 2018-02-19 ENCOUNTER — HOSPITAL ENCOUNTER (OUTPATIENT)
Dept: CARDIOLOGY | Facility: CLINIC | Age: 58
Discharge: HOME | End: 2018-02-19
Attending: INTERNAL MEDICINE | Admitting: INTERNAL MEDICINE
Payer: COMMERCIAL

## 2018-02-19 ENCOUNTER — HOSPITAL ENCOUNTER (OUTPATIENT)
Dept: NUCLEAR MEDICINE | Facility: CLINIC | Age: 58
Setting detail: NUCLEAR MEDICINE
Discharge: HOME | End: 2018-02-19
Attending: INTERNAL MEDICINE | Admitting: INTERNAL MEDICINE
Payer: COMMERCIAL

## 2018-02-19 DIAGNOSIS — Z76.82 ORGAN TRANSPLANT CANDIDATE: ICD-10-CM

## 2018-02-19 DIAGNOSIS — J84.9 LUNG DISEASE, INTERSTITIAL (H): ICD-10-CM

## 2018-02-19 DIAGNOSIS — R06.02 SHORTNESS OF BREATH: ICD-10-CM

## 2018-02-19 LAB
ABO + RH BLD: NORMAL
ALBUMIN SERPL-MCNC: 3.4 G/DL (ref 3.4–5)
ALBUMIN UR-MCNC: 30 MG/DL
ALP SERPL-CCNC: 60 U/L (ref 40–150)
ALT SERPL W P-5'-P-CCNC: 24 U/L (ref 0–50)
AMYLASE SERPL-CCNC: 58 U/L (ref 30–110)
ANION GAP SERPL CALCULATED.3IONS-SCNC: 7 MMOL/L (ref 3–14)
APPEARANCE UR: ABNORMAL
APTT PPP: 27 SEC (ref 22–37)
AST SERPL W P-5'-P-CCNC: 27 U/L (ref 0–45)
BACTERIA #/AREA URNS HPF: ABNORMAL /HPF
BASE EXCESS BLDV CALC-SCNC: 1.5 MMOL/L
BASOPHILS # BLD AUTO: 0.1 10E9/L (ref 0–0.2)
BASOPHILS NFR BLD AUTO: 0.6 %
BILIRUB SERPL-MCNC: 0.6 MG/DL (ref 0.2–1.3)
BILIRUB UR QL STRIP: NEGATIVE
BLD GP AB SCN SERPL QL: NORMAL
BLD GP AB SCN TITR SERPL: NORMAL {TITER}
BLOOD BANK CMNT PATIENT-IMP: NORMAL
BLOOD BANK CMNT PATIENT-IMP: NORMAL
BUN SERPL-MCNC: 20 MG/DL (ref 7–30)
CALCIUM SERPL-MCNC: 8.9 MG/DL (ref 8.5–10.1)
CHLORIDE SERPL-SCNC: 106 MMOL/L (ref 94–109)
CHOLEST SERPL-MCNC: 168 MG/DL
CMV IGG SERPL QL IA: >8 AI (ref 0–0.8)
CO2 SERPL-SCNC: 28 MMOL/L (ref 20–32)
COLOR UR AUTO: YELLOW
CREAT SERPL-MCNC: 1.02 MG/DL (ref 0.52–1.04)
DEPRECATED CALCIDIOL+CALCIFEROL SERPL-MC: 55 UG/L (ref 20–75)
DIFFERENTIAL METHOD BLD: ABNORMAL
EBV VCA IGG SER QL IA: >8 AI (ref 0–0.8)
EOSINOPHIL # BLD AUTO: 0.3 10E9/L (ref 0–0.7)
EOSINOPHIL NFR BLD AUTO: 2.1 %
ERYTHROCYTE [DISTWIDTH] IN BLOOD BY AUTOMATED COUNT: 14.8 % (ref 10–15)
GFR SERPL CREATININE-BSD FRML MDRD: 56 ML/MIN/1.7M2
GLUCOSE SERPL-MCNC: 90 MG/DL (ref 70–99)
GLUCOSE UR STRIP-MCNC: NEGATIVE MG/DL
HAV IGG SER QL IA: REACTIVE
HBV CORE AB SERPL QL IA: NONREACTIVE
HBV SURFACE AB SERPL IA-ACNC: 0.64 M[IU]/ML
HBV SURFACE AG SERPL QL IA: NONREACTIVE
HCO3 BLDV-SCNC: 28 MMOL/L (ref 21–28)
HCT VFR BLD AUTO: 42.8 % (ref 35–47)
HCV AB SERPL QL IA: NONREACTIVE
HDLC SERPL-MCNC: 48 MG/DL
HGB BLD-MCNC: 13.9 G/DL (ref 11.7–15.7)
HGB UR QL STRIP: ABNORMAL
HIV 1+2 AB+HIV1 P24 AG SERPL QL IA: NONREACTIVE
HSV1 IGG SERPL QL IA: >8 AI (ref 0–0.8)
HSV2 IGG SERPL QL IA: <0.2 AI (ref 0–0.8)
HYALINE CASTS #/AREA URNS LPF: 7 /LPF (ref 0–2)
IGA SERPL-MCNC: 425 MG/DL (ref 70–380)
IGM SERPL-MCNC: 502 MG/DL (ref 60–265)
IMM GRANULOCYTES # BLD: 0 10E9/L (ref 0–0.4)
IMM GRANULOCYTES NFR BLD: 0.3 %
INR PPP: 1.01 (ref 0.86–1.14)
KETONES UR STRIP-MCNC: NEGATIVE MG/DL
LDLC SERPL CALC-MCNC: 97 MG/DL
LEUKOCYTE ESTERASE UR QL STRIP: ABNORMAL
LYMPHOCYTES # BLD AUTO: 1 10E9/L (ref 0.8–5.3)
LYMPHOCYTES NFR BLD AUTO: 8.4 %
MAGNESIUM SERPL-MCNC: 1.5 MG/DL (ref 1.6–2.3)
MCH RBC QN AUTO: 33.8 PG (ref 26.5–33)
MCHC RBC AUTO-ENTMCNC: 32.5 G/DL (ref 31.5–36.5)
MCV RBC AUTO: 104 FL (ref 78–100)
MONOCYTES # BLD AUTO: 0.9 10E9/L (ref 0–1.3)
MONOCYTES NFR BLD AUTO: 7.5 %
MUCOUS THREADS #/AREA URNS LPF: PRESENT /LPF
NEUTROPHILS # BLD AUTO: 9.7 10E9/L (ref 1.6–8.3)
NEUTROPHILS NFR BLD AUTO: 81.1 %
NITRATE UR QL: NEGATIVE
NONHDLC SERPL-MCNC: 120 MG/DL
NRBC # BLD AUTO: 0 10*3/UL
NRBC BLD AUTO-RTO: 0 /100
O2/TOTAL GAS SETTING VFR VENT: ABNORMAL %
OXYHGB MFR BLDV: 16 %
PCO2 BLDV: 51 MM HG (ref 40–50)
PH BLDV: 7.35 PH (ref 7.32–7.43)
PH UR STRIP: 5 PH (ref 5–7)
PHOSPHATE SERPL-MCNC: 3.2 MG/DL (ref 2.5–4.5)
PLATELET # BLD AUTO: 349 10E9/L (ref 150–450)
PO2 BLDV: 16 MM HG (ref 25–47)
POTASSIUM SERPL-SCNC: 4 MMOL/L (ref 3.4–5.3)
PREALB SERPL IA-MCNC: 26 MG/DL (ref 15–45)
PROT SERPL-MCNC: 8.1 G/DL (ref 6.8–8.8)
RBC # BLD AUTO: 4.11 10E12/L (ref 3.8–5.2)
RBC #/AREA URNS AUTO: 1 /HPF (ref 0–2)
SODIUM SERPL-SCNC: 141 MMOL/L (ref 133–144)
SOURCE: ABNORMAL
SP GR UR STRIP: 1.02 (ref 1–1.03)
SPECIMEN EXP DATE BLD: NORMAL
SPECIMEN EXP DATE BLD: NORMAL
SQUAMOUS #/AREA URNS AUTO: 3 /HPF (ref 0–1)
T GONDII IGG SER QL: <3 IU/ML (ref 0–7.1)
TRANS CELLS #/AREA URNS HPF: <1 /HPF
TRANSFERRIN SERPL-MCNC: 207 MG/DL (ref 210–360)
TRIGL SERPL-MCNC: 115 MG/DL
TSH SERPL DL<=0.005 MIU/L-ACNC: 3.07 MU/L (ref 0.4–4)
UROBILINOGEN UR STRIP-MCNC: 0 MG/DL (ref 0–2)
VZV IGG SER QL IA: 3.8 AI (ref 0–0.8)
WBC # BLD AUTO: 11.9 10E9/L (ref 4–11)
WBC #/AREA URNS AUTO: 5 /HPF (ref 0–2)

## 2018-02-19 PROCEDURE — 34300033 ZZH RX 343: Performed by: INTERNAL MEDICINE

## 2018-02-19 PROCEDURE — 78580 LUNG PERFUSION IMAGING: CPT

## 2018-02-19 PROCEDURE — 40000264 ECHO COMPLETE BUBBLE

## 2018-02-19 PROCEDURE — 93306 TTE W/DOPPLER COMPLETE: CPT | Mod: 26 | Performed by: INTERNAL MEDICINE

## 2018-02-19 PROCEDURE — A9540 TC99M MAA: HCPCS | Performed by: INTERNAL MEDICINE

## 2018-02-19 RX ORDER — ACYCLOVIR 200 MG/1
12 CAPSULE ORAL ONCE
Status: DISCONTINUED | OUTPATIENT
Start: 2018-02-19 | End: 2018-02-20 | Stop reason: HOSPADM

## 2018-02-19 RX ADMIN — Medication 6 MILLICURIE: at 13:50

## 2018-02-20 ENCOUNTER — ALLIED HEALTH/NURSE VISIT (OUTPATIENT)
Dept: TRANSPLANT | Facility: CLINIC | Age: 58
End: 2018-02-20
Attending: INTERNAL MEDICINE
Payer: COMMERCIAL

## 2018-02-20 ENCOUNTER — PRE VISIT (OUTPATIENT)
Dept: CARDIOLOGY | Facility: CLINIC | Age: 58
End: 2018-02-20

## 2018-02-20 VITALS — BODY MASS INDEX: 19.5 KG/M2 | WEIGHT: 113.6 LBS

## 2018-02-20 DIAGNOSIS — Z76.82 ORGAN TRANSPLANT CANDIDATE: ICD-10-CM

## 2018-02-20 DIAGNOSIS — J84.9 ILD (INTERSTITIAL LUNG DISEASE) (H): Primary | ICD-10-CM

## 2018-02-20 DIAGNOSIS — J84.9 LUNG DISEASE, INTERSTITIAL (H): ICD-10-CM

## 2018-02-20 DIAGNOSIS — Z76.82 ORGAN TRANSPLANT CANDIDATE: Primary | ICD-10-CM

## 2018-02-20 DIAGNOSIS — Z51.81 ENCOUNTER FOR THERAPEUTIC DRUG MONITORING: Primary | ICD-10-CM

## 2018-02-20 DIAGNOSIS — M33.91: ICD-10-CM

## 2018-02-20 DIAGNOSIS — R82.90 ABNORMAL URINALYSIS: ICD-10-CM

## 2018-02-20 DIAGNOSIS — Z76.82 LUNG TRANSPLANT CANDIDATE: ICD-10-CM

## 2018-02-20 LAB
HLA TYPING COMPLETE SOT RECIPIENT: NORMAL
IGG SERPL-MCNC: 1790 MG/DL (ref 695–1620)
IGG1 SER-MCNC: 1300 MG/DL (ref 300–856)
IGG2 SER-MCNC: 131 MG/DL (ref 158–761)
IGG3 SER-MCNC: 101 MG/DL (ref 24–192)
IGG4 SER-MCNC: 1 MG/DL (ref 11–86)
M TB TUBERC IFN-G BLD QL: NEGATIVE
M TB TUBERC IFN-G/MITOGEN IGNF BLD: 0 IU/ML
PRA SINGLE ANTIGEN IGG ANTIBODY: NORMAL

## 2018-02-20 PROCEDURE — 40000269 ZZH STATISTIC NO CHARGE FACILITY FEE: Mod: ZF

## 2018-02-20 NOTE — MR AVS SNAPSHOT
After Visit Summary   2/20/2018    Kecia Blue    MRN: [id number]           Patient Information     Date Of Birth          1960        Visit Information        Provider Department      2/20/2018 1:30 PM Redd Ruiz [facility] Solid Organ Transplant        Today's Diagnoses     ILD (interstitial lung disease) (H)    -  1    Lung transplant candidate           Follow-ups after your visit        Your next 10 appointments already scheduled     Feb 22, 2018   Procedure with Enrico Sheppard MD   [facility] [place], Endoscopy ([facility])    [address] [address]   [phone]           The [facility] is located on the corner of [address] and [address] on the [facility] [address] [facility]. It is easily accessible from virtually any point [place], via [address] and [address].            Feb 23, 2018  8:30 AM CST   Pulmonary Eval with  Pulmonary Rehab 1   [facility], Cardiac Rehabilitation ([facility])    [address] 1[address]  [facility] [address]   [phone]            Feb 23, 2018 10:20 AM CST   (Arrive by 10:05 AM)   CT CHEST W/O CONTRAST with UCCT1   [facility] ([facility])    [address]  1st Floor  [facility] [address] [phone]           Please bring any scans or X-rays taken at other hospitals, if similar tests were done. Also bring a list of your medicines, including vitamins, minerals and over-the-counter drugs. It is safest to leave personal items at home.  Be sure to tell your doctor:   If you have any allergies.   If there s any chance you are pregnant.   If you are breastfeeding.  You do not need to do anything special to prepare for this exam.  Please wear loose clothing, such as a sweat suit or jogging clothes. Avoid snaps,  zippers and other metal. We may ask you to undress and put on a hospital gown.            Feb 23, 2018 10:45 AM CST   Lab with  LAB   [facility] ([facility])    [address]  1st Floor  [facility] [id number] [phone]            Feb 23, 2018 11:00 AM CST   Nurse Visit with  Txc Nurse   [facility] Solid Organ Transplant ([facility])    [address] [place] [address]  3rd Floor  [facility] [id number] [phone]            Feb 23, 2018 11:30 AM CST   (Arrive by 11:15 AM)   SOT CARE COORDINATOR EVAL with Camilla Antoine RN   [facility] Solid Organ Transplant ([facility])    [address] [place] [address]  [address]  [facility] [id number] [phone]            Feb 23, 2018  2:30 PM CST   (Arrive by 2:15 PM)   New Patient Visit with Toby Hernandez MD   [facility] Care ([facility])    [address] [place] [address]  [address]  [facility] [address] [phone]            Feb 27, 2018  1:45 PM CST   (Arrive by 1:30 PM)   New Patient Visit with Syd Márquez MD   [facility] Nose [facility]at ([facility])    [address] [place] [address]  4th Floor  [facility] [id number] [phone]              Future tests that were ordered for you today     Open Future Orders        Priority Expected Expires Ordered    Tacrolimus level Routine 2/23/2018 3/22/2018 2/20/2018    Routine UA with micro reflex to culture Routine 2/23/2018 3/22/2018 2/20/2018            Who to contact     If you have questions or need follow up information about today's clinic visit or your schedule please contact [facility] SOL[facility] ORGAN TRANSPLANT directly at [phone].  Normal or non-critical lab and imaging results will be communicated to you by MyChart, letter or phone within 4 business days after the clinic has received the results. If you do not hear from us within 7 days, please contact the clinic  "through [employer] or phone. If you have a critical or abnormal lab result, we will notify you by phone as soon as possible.  Submit refill requests through [employer] or call your pharmacy and they will forward the refill request to us. Please allow 3 business days for your refill to be completed.          Additional Information About Your Visit        [employer] Information     [employer] lets you send messages to your doctor, view your test results, renew your prescriptions, schedule appointments and more. To sign up, go to [email or web address] . Click on \"Log in\" on the left side of the screen, which will take you to the Welcome page. Then click on \"Sign up Now\" on the right side of the page.     You will be asked to enter the access code listed below, as well as some personal information. Please follow the directions to create your username and password.     Your access code is: [email or web address]  Expires: 2018  9:48 AM     Your access code will  in 90 days. If you need help or a new code, please call your [place] clinic or [phone].        Care EveryWhere ID     This is your Care EveryWhere ID. This could be used by other organizations to access your [place] medical records  [id number]         Blood Pressure from Last 3 Encounters:   18 144/90   18 119/79    Weight from Last 3 Encounters:   18 51.3 kg (113 lb)   18 51.5 kg (113 lb 9.6 oz)   18 54.6 kg (120 lb 4.8 oz)              Today, you had the following     No orders found for display       Primary Care Provider Office Phone # Fax #    Rosy Vu [phone]       [facility]  [address]  [facility] [address]        Equal Access to Services     OLIVA VALENCIA : Sahara orellana Soto, waaxda luqadaha, qaybta kaalmada татьяна, morro kelley. So [facility] [phone].    ATENCIÓN: Si habla español, tiene a taylor disposición servicios gratuitos de asistencia " lingüísticaAd Sharma al [phone].    We comply with applicable federal civil rights laws and [place] laws. We do not discriminate on the basis of race, color, national origin, age, disability, sex, sexual orientation, or gender identity.            Thank you!     Thank you for choosing [facility]  for your care. Our goal is always to provide you with excellent care. Hearing back from our patients is one way we can continue to improve our services. Please take a few minutes to complete the written survey that you may receive in the mail after your visit with us. Thank you!             Your Updated Medication List - Protect others around you: Learn how to safely use, store and throw away your medicines at www.disposemymeds.org.          This list is accurate as of 2/20/18 11:59 PM.  Always use your most recent med list.                   Brand Name Dispense Instructions for use Diagnosis    AZATHIOPRINE PO      Take 50 mg by mouth 2 times daily 50 mg tablet, 1.5 tablets by mouth 2 times a day        calcium carbonate 1250 MG tablet    OS-CHELSEY 500 mg [place]     Take 1 tablet by mouth 2 times daily        multivitamin, therapeutic with minerals Tabs tablet      Take 1 tablet by mouth daily        nystatin 127319 UNIT/ML suspension    MYCOSTATIN     Take 100,000 Units by mouth 4 times daily 100,000 unit/ ml, take 4- 6 ml by mouth 4 times a day.        order for DME     1 Device    Equipment being ordered: Oxygen 5 liters at rest, 15 liters with exertion.  Needs portability.  Please provide 2 10-liter concentrators for in the home    ILD (interstitial lung disease) (H), Hypoxia       PREDNISONE PO      Take 5 mg by mouth daily        sulfamethoxazole-trimethoprim 800-160 MG per tablet    BACTRIM DS/SEPTRA DS     Take 1 tablet by mouth Every Mon, Wed, Fri Morning        TACROLIMUS PO      Take 0.1 mg by mouth 2 times daily 0.1 mg capsule, take 4 capsules by mouth 2 times a day.

## 2018-02-20 NOTE — MR AVS SNAPSHOT
After Visit Summary   2/20/2018    Kecia Blue    MRN: [id number]           Patient Information     Date Of Birth          1960        Visit Information        Provider Department      2/20/2018 9:30 AM Camilla Antoine RN [facility] Solid Organ Transplant        Today's Diagnoses     Organ transplant candidate        Lung disease, interstitial (H)           Follow-ups after your visit        Your next 10 appointments already scheduled     Feb 21, 2018  8:30 AM CST   Procedure 1.5 hr with U2A ROOM 17   [address] [facility] ([facility])    [address] [address]               Feb 21, 2018 10:00 AM CST   Cath 90 Minute with UUHCVR5   [facility],  Heart Cath Lab ([facility])    [address] [address] [phone]            Feb 22, 2018   Procedure with Enrico Sheppard MD   [facility], [place], Endoscopy ([facility])    [address] [address] [phone]           The [facility] is located on the corner of [facility] [address] and [address] on the [facility]. It is easily accessible from virtually any point in the [place] area, via [address] and [address].            Feb 23, 2018  8:30 AM CST   Pulmonary Eval with  Pulmonary Rehab 1   [facility], Cardiac Rehabilitation ([facility])    [address] 1st Floor [address]  [facility] [id number] [phone]            Feb 23, 2018 10:20 AM CST   (Arrive by 10:05 AM)   CT CHEST W/O CONTRAST with UCCT1   [facility] Imaging Center CT ([facility])    [address]  1st Floor  [facility] [address] [phone]           Please bring any scans or X-rays taken at other hospitals, if similar tests were done. Also bring a list of  your medicines, including vitamins, minerals and over-the-counter drugs. It is safest to leave personal items at home.  Be sure to tell your doctor:   If you have any allergies.   If there s any chance you are pregnant.   If you are breastfeeding.  You do not need to do anything special to prepare for this exam.  Please wear loose clothing, such as a sweat suit or jogging clothes. Avoid snaps, zippers and other metal. We may ask you to undress and put on a hospital gown.            Feb 23, 2018 10:45 AM CST   Lab with  LAB    Health Lab ([facility])    [address]  1st Floor  [facility] [address] [phone]            Feb 23, 2018 11:00 AM CST   [facility]   [facility] Solid Organ Transplant ([facility])    [address]  3rd Floor  [facility] [address] [phone]            Feb 23, 2018 11:30 AM CST   (Arrive by 11:15 AM)   SOT CARE COORDINATOR EVAL with Camilla Antoine RN   [facility] Solid Organ Transplant ([facility])    [address]  [address]  [facility] [address] [phone]            Feb 23, 2018  2:30 PM CST   (Arrive by 2:15 PM)   New Patient Visit with Toby Hernandez MD   [facility] Heart Care ([facility])    [address]  [address]  [facility] [address] [phone]              Future tests that were ordered for you today     Open Future Orders        Priority Expected Expires Ordered    Echo Complete Bubble Routine 2/19/2018 5/28/2018 1/29/2018            Who to contact     If you have questions or need follow up information about today's clinic visit or your schedule please contact [facility] SOLID ORGAN TRANSPLANT directly at [phone].  Normal or non-critical lab and imaging results will be communicated to you by MyChart, letter or phone within 4 business days after the clinic has received the results. If  "you do not hear from us within 7 days, please contact the clinic through [employer] or phone. If you have a critical or abnormal lab result, we will notify you by phone as soon as possible.  Submit refill requests through [employer] or call your pharmacy and they will forward the refill request to us. Please allow 3 business days for your refill to be completed.          Additional Information About Your Visit        [employer] Information     [employer] lets you send messages to your doctor, view your test results, renew your prescriptions, schedule appointments and more. To sign up, go to [email or web address] . Click on \"Log in\" on the left side of the screen, which will take you to the Welcome page. Then click on \"Sign up Now\" on the right side of the page.     You will be asked to enter the access code listed below, as well as some personal information. Please follow the directions to create your username and password.     Your access code is: [email or web address]  Expires: 2018  9:48 AM     Your access code will  in 90 days. If you need help or a new code, please call your [place] clinic or [phone].        Care EveryWhere ID     This is your Care EveryWhere ID. This could be used by other organizations to access your [place] medical records  [id number]         Blood Pressure from Last 3 Encounters:   18 119/79    Weight from Last 3 Encounters:   18 51.5 kg (113 lb 9.6 oz)   18 54.6 kg (120 lb 4.8 oz)   17 52.2 kg (115 lb)              Today, you had the following     No orders found for display       Primary Care Provider Office Phone # Fax #    Rosy Vu [phone]       [facility]  [address]  [facility] [address]        Equal Access to Services     ALBAN VALENCIA : Sahara Lucas, wakaren luqadaha, qamaxta kaalmorro barrett. [facility] [phone].    ATENCIÓN: Si warner ashby " disposición servicios gratuitos de asistencia lingüística. Zachary olson [phone].    We comply with applicable federal civil rights laws and [place] laws. We do not discriminate on the basis of race, color, national origin, age, disability, sex, sexual orientation, or gender identity.            Thank you!     Thank you for choosing [facility] SOLID ORGAN TRANSPLANT  for your care. Our goal is always to provide you with excellent care. Hearing back from our patients is one way we can continue to improve our services. Please take a few minutes to complete the written survey that you may receive in the mail after your visit with us. Thank you!             Your Updated Medication List - Protect others around you: Learn how to safely use, store and throw away your medicines at www.disposemymeds.org.          This list is accurate as of 2/20/18  5:21 PM.  Always use your most recent med list.                   Brand Name Dispense Instructions for use Diagnosis    AZATHIOPRINE PO      Take 50 mg by mouth 2 times daily 50 mg tablet, 1.5 tablets by mouth 2 times a day        calcium carbonate 1250 MG tablet    OS-CHELSEY 500 mg [place]. Ca     Take 1 tablet by mouth 2 times daily        multivitamin, therapeutic with minerals Tabs tablet      Take 1 tablet by mouth daily        nystatin 757701 UNIT/ML suspension    MYCOSTATIN     Take 100,000 Units by mouth 4 times daily 100,000 unit/ ml, take 4- 6 ml by mouth 4 times a day.        order for DME     1 Device    Equipment being ordered: Oxygen 5 liters at rest, 15 liters with exertion.  Needs portability.  Please provide 2 10-liter concentrators for in the home    ILD (interstitial lung disease) (H), Hypoxia       PREDNISONE PO      Take 5 mg by mouth daily        sulfamethoxazole-trimethoprim 800-160 MG per tablet    BACTRIM DS/SEPTRA DS     Take 1 tablet by mouth Every Mon, Wed, Fri Morning        TACROLIMUS PO      Take 0.1 mg by mouth 2 times daily 0.1 mg capsule, take 4  capsules by mouth 2 times a day.

## 2018-02-20 NOTE — MR AVS SNAPSHOT
After Visit Summary   2/20/2018    Kecia Blue    MRN: [id number]           Patient Information     Date Of Birth          1960        Visit Information        Provider Department      2/20/2018 1:00 PM Olesya Ewing RD [facility] Solid Organ Transplant        Today's Diagnoses     Organ transplant candidate    -  1       Follow-ups after your visit        Your next 10 appointments already scheduled     Feb 20, 2018  2:40 PM CST   (Arrive by 2:25 PM)   ecg with  CV EKG   [facility] Xray ([facility])    [address]  1st Floor  [facility] [address]   [phone]            Feb 21, 2018  8:30 AM CST   Procedure 1.5 hr with U2A ROOM 17   Unit 2A [facility] ([facility])    [address] [facility] [address]               Feb 21, 2018 10:00 AM CST   Cath 90 Minute with UUHCVR5   [facility],  Heart Cath Lab ([facility])    [address] [address] [phone]            Feb 22, 2018   Procedure with Enrico Sheppard MD   [facility], [place], Endoscopy ([facility])    [address] [address] [phone]           The [facility] is located on the corner of [address] [facility] on the [facility]. It is easily accessible from virtually any point in the [place] area, via [address] and [address].            Feb 23, 2018  8:30 AM CST   Pulmonary Eval with Ur Pulmonary Rehab 1   [facility] [place], Cardiac Rehabilitation ([facility])    [address] 1st Floor [address]  [facility] [address]   [phone]            Feb 23, 2018 10:20 AM CST   (Arrive by 10:05 AM)   CT CHEST W/O CONTRAST with UCCT1   [facility] CT ([facility]  [place])    [address] [place] [address]  [address] [facility] [address] [phone]           Please bring any scans or X-rays taken at other hospitals, if similar tests were done. Also bring a list of your medicines, including vitamins, minerals and over-the-counter drugs. It is safest to leave personal items at home.  Be sure to tell your doctor:   If you have any allergies.   If there s any chance you are pregnant.   If you are breastfeeding.  You do not need to do anything special to prepare for this exam.  Please wear loose clothing, such as a sweat suit or jogging clothes. Avoid snaps, zippers and other metal. We may ask you to undress and put on a hospital gown.            Feb 23, 2018 10:45 AM CST   Lab with  LAB    Health Lab ([facility])    [address] [place] [address]  [address] [facility] [address] [phone]            Feb 23, 2018 11:00 AM CST   Nurse Visit with  Txc Nurse   [facility] Solid Organ Transplant ([facility])    [address] [place] [address]  3rd Floor  [facility] [address] [phone]            Feb 23, 2018 11:30 AM CST   (Arrive by 11:15 AM)   SOT CARE COORDINATOR EVAL with Camilla Antoine RN   [facility] Solid Organ Transplant ([facility])    [address] [place] [address]  [address]  [facility] [address] [phone]            Feb 23, 2018  2:30 PM CST   (Arrive by 2:15 PM)   New Patient Visit with Toby Hernandez MD   [facility] Heart Care ([facility])    [address] [place] [address]  [address]  [facility] [address] [phone]              Future tests that were ordered for you today     Open Future Orders        Priority Expected Expires Ordered    Echo Complete Bubble Routine 2/19/2018 5/28/2018 1/29/2018            Who to contact     If you have questions or need follow up information about today's clinic visit or your schedule please contact [facility]  "TRANSPLANT directly at [phone].  Normal or non-critical lab and imaging results will be communicated to you by MyChart, letter or phone within 4 business days after the clinic has received the results. If you do not hear from us within 7 days, please contact the clinic through [employer] or phone. If you have a critical or abnormal lab result, we will notify you by phone as soon as possible.  Submit refill requests through [employer] or call your pharmacy and they will forward the refill request to us. Please allow 3 business days for your refill to be completed.          Additional Information About Your Visit        [employer] Information     [employer] lets you send messages to your doctor, view your test results, renew your prescriptions, schedule appointments and more. To sign up, go to [email or web address] . Click on \"Log in\" on the left side of the screen, which will take you to the Welcome page. Then click on \"Sign up Now\" on the right side of the page.     You will be asked to enter the access code listed below, as well as some personal information. Please follow the directions to create your username and password.     Your access code is: [email or web address]  Expires: 2018  9:48 AM     Your access code will  in 90 days. If you need help or a new code, please call your [place] clinic or [phone].        Care EveryWhere ID     This is your Care EveryWhere ID. This could be used by other organizations to access your [place] medical records  [id number]        Your Vitals Were     BMI (Body Mass Index)                   19.5 kg/m2            Blood Pressure from Last 3 Encounters:   18 119/79    Weight from Last 3 Encounters:   18 51.5 kg (113 lb 9.6 oz)   18 54.6 kg (120 lb 4.8 oz)   17 52.2 kg (115 lb)              Today, you had the following     No orders found for display       Primary Care Provider Office Phone # Fax #    Rosy Vu [phone]    "    [facility]  [address]  [facility] [address]        Equal Access to Services     [facility] Center[facility] : Hadii patricia moon hadryleyvadim Soto, waaxda luqadaha, qaybta kaalmada gianlucadaniella, morro casper marceleloina houghkelli farrarofelia kelley. So [facility] [phone].    ATENCIÓN: Si habla español, tiene a taylor disposición servicios gratuitos de asistencia lingüística. Llame al [phone].    We comply with applicable federal civil rights laws and [place] laws. We do not discriminate on the basis of race, color, national origin, age, disability, sex, sexual orientation, or gender identity.            Thank you!     Thank you for choosing [facility] SOLID ORGAN TRANSPLANT  for your care. Our goal is always to provide you with excellent care. Hearing back from our patients is one way we can continue to improve our services. Please take a few minutes to complete the written survey that you may receive in the mail after your visit with us. Thank you!             Your Updated Medication List - Protect others around you: Learn how to safely use, store and throw away your medicines at www.disposemymeds.org.          This list is accurate as of 2/20/18  2:34 PM.  Always use your most recent med list.                   Brand Name Dispense Instructions for use Diagnosis    AZATHIOPRINE PO      Take 50 mg by mouth 2 times daily 50 mg tablet, 1.5 tablets by mouth 2 times a day        calcium carbonate 1250 MG tablet    OS-CHELSEY 500 mg [place]. Ca     Take 1 tablet by mouth 2 times daily        multivitamin, therapeutic with minerals Tabs tablet      Take 1 tablet by mouth daily        nystatin [id number] UNIT/ML suspension    MYCOSTATIN     Take 100,000 Units by mouth 4 times daily 100,000 unit/ ml, take 4- 6 ml by mouth 4 times a day.        order for DME     1 Device    Equipment being ordered: Oxygen 5 liters at rest, 15 liters with exertion.  Needs portability.  Please provide 2 10-liter concentrators for in the home    ILD (interstitial lung  disease) (H), Hypoxia       PREDNISONE PO      Take 5 mg by mouth daily        sulfamethoxazole-trimethoprim 800-160 MG per tablet    BACTRIM DS/SEPTRA DS     Take 1 tablet by mouth Every Mon, Wed, Fri Morning        TACROLIMUS PO      Take 0.1 mg by mouth 2 times daily 0.1 mg capsule, take 4 capsules by mouth 2 times a day.

## 2018-02-20 NOTE — PROGRESS NOTES
"Patient and spouse came to the pre lung class, content per [employer] videos. They were attentive, stated understanding, and asked good questions. They were given all relevant handouts.   Verified that patient has received the following items:      \"Questions and Answers for Transplant Candidates and Families about Multiple Listing Waiting Time Transfer\"       One-Year Survival Rates for Heart and Lung Transplant  for [facility].      Reviewed the following documents with the patient:      \"Guidelines for Being on the Transplant List\".      \" What You Need to Know about a Lung and Heart-Lung Transplant .      Provided Thoracic Transplant Patient Handbook.     Required signatures obtained and forms are scanned to EMR.  Addressed patient questions and concerns regarding transplant.    Discussed donor offers including increased risk, and DCD donors.     Discussed physician and coordinator management pre to post lung transplant.     HLA results pending.  Discussed PRA monitoring with patient.     Will review with Lung Transplant Team for transplant candidacy when evaluation complete.      Pt and family were encouraged to create login/password for [employer] to continue to view videos to reinforce education.      Patient received form for information for donor family at time of donation/transplant.  Will collect during closure visit.      Kecia needs a tacrolimus level, confirmed will order one for Friday.  Per review with Dr. Hood, will also repeat the urinalysis.  "

## 2018-02-20 NOTE — PROGRESS NOTES
Outpatient MNT: Lung Transplant Evaluation    Current BMI: 19.5  BMI is within criteria of <30 for lung transplant  Recommend BMI remain >18 or >105 lbs to remain eligible for transplant      Time Spent: 15 minutes  Visit Type: Initial  Referring Physician: Dr Hood  Pt accompanied by: her Ranjan     History of previous txp: none    Nutrition Assessment  Pt's  cooks at home.     Appetite: poor appetite most of her life, not new with dx of lung disease     Vitamins, Supplements, Pertinent Meds: calcium, MVI, prednisone   Herbal Medicines/Supplements: none     Diet Recall  Breakfast Toast or eggs leftover from 's breakfast    Lunch 1/2 s/w or Boost - started Boost 1 week ago    Dinner Soup, hot dish, leftovers, meatloaf, salad, lasagna    Snacks Peanuts/M&M/raisin mixture    Beverages Water, coffee, 1 pop/day, 6 oz juice/day, 12 oz 2% milk/day    Alcohol None    Dining out 2x/month      Physical Activity  None      Anthropometrics  Height:   64 in   BMI:    19.5    Weight Status:Normal BMI   Weight:  113 lbs            IBW (lb): 120  % IBW: 94    Wt Hx: Pt reports weight relatively stable. However, weight on 1/5 of 120, today down 7 lbs x 6 weeks, or 6%.     Adj/dosing BW: 113 lbs/52 kg       Frailty Screening   Weakness Meets criteria for frailty if  strength (average of 3 trials, dominant hand) is:    Men  ?29 kg for BMI ?24  ?30 kg for BMI 24.1-26  ?30 kg for BMI 26.1-28  ?32 kg for BMI >28 Women  ?17 kg for BMI ?23  ?17.3 kg for BMI 23.1-26  ?18 kg for BMI 26.1-29  ?21 kg for BMI >29    Equipment: Tony hand dynamometer  Participant attempts to squeeze the dynamometer maximally 3 times with the dominant hand.   Average of 3 trials: 10 kg with BMI of 19.5  Meets criteria for frailty based on handgrip strength: yes    Labs  Recent Labs   Lab Test  02/19/18   0759   CHOL  168   HDL  48*   LDL  97   TRIG  115     Malnutrition  % Intake: No decreased intake noted  % Weight Loss: Up to 5% in 1  month (non-severe malnutrition)  Subcutaneous Fat Loss: Mild, yet low at baseline  Muscle Loss: Moderate, yet low at baseline  Fluid Accumulation/Edema: None noted  Malnutrition Diagnosis: Non-Severe malnutrition in the context of chronic illness     Estimated Nutrition Needs  Energy  2624-2701     (30-35 kcal/kg for weight gain)     Protein  52-62    (1-1.2 g/kg for maintenance/weight gain)         Fluid  1 ml/kcal or per MD     Nutrition Diagnosis  Unintended weight loss r/t poor appetite although not new with lung disease, however, likely eating less than baseline intake AEB 6% weight loss x 6 weeks, BMI of 19.5 with goal of >18 for lung transplant.     Food and nutrition related knowledge deficit r/t pre lung transplant eval AEB pt verbalized not hearing pre/post transplant diet guidelines.    Nutrition Intervention  Nutrition education provided:  Reviewed methods to ensure weight maintenance vs gain - add high calorie/calorie-dense items to food such as butter, oil, cheese, cream, nuts, peanut butter, cream cheese, etc. Encouraged increasing to 2 Boost or equivalent/day. Also encouraged her to consider keeping a food journal to see her current calorie intake.     Reviewed BMI criteria for transplant.     Reviewed post txp diet guidelines in brief (will review in further detail post txp):  (1) Review of proper food safety measures d/t immunosuppressant therapy post-op and increased risk for food-borne illness   (2) Stressed importance of not taking any herbal/Chinese/alternative medicines or supplements post txp (d/t risk for rejection, unknown effects on the organs, potential interactions with immunosuppresants).   (3) Med regimen and possible side effects    Patient Understanding: Pt verbalized understanding of education provided.  Expected Compliance: Good  Follow-Up Plans: PRN     Nutrition Goals  1. Pt to verbalize understanding of 3 aspects of post txp education provided  2. BMI to remain >18 or >105 lbs  to remain eligible for transplant     Provided pt with contact info.   Olesya Ewing RD, LD  [facility] [phone]

## 2018-02-20 NOTE — MR AVS SNAPSHOT
After Visit Summary   2/20/2018    Kecia Blue    MRN: [id number]           Patient Information     Date Of Birth          1960        Visit Information        Provider Department      2/20/2018 10:00 AM Camilla Antoine RN [facility] Solid Organ Transplant        Today's Diagnoses     Encounter for therapeutic drug monitoring    -  1    Organ transplant candidate        Dermatomyositis with respiratory involvement (H)        Lung disease, interstitial (H)        Abnormal urinalysis           Follow-ups after your visit        Your next 10 appointments already scheduled     Feb 21, 2018  8:30 AM CST   Procedure 1.5 hr with U2A ROOM 17   Unit [address]A [facility] ([facility])    [address] [address]               Feb 21, 2018 10:00 AM CST   Cath 90 Minute with [id number]   [facility],  Heart Cath Lab ([facility])    [address] [address]   [phone]            Feb 22, 2018   Procedure with Enrico Sheppard MD   [facility], Endoscopy ([facility])    [address] [address]   [phone]           [facility] is located on the corner of [address] on the [facility]. It is easily accessible from virtually any point [place], via [address] and [address].            Feb 23, 2018  8:30 AM CST   Pulmonary Eval with Ur Pulmonary Rehab 1   [facility] [place], Cardiac Rehabilitation ([facility])    [address] 1st [address]  [facility] [address]   [phone]            Feb 23, 2018 10:20 AM CST   (Arrive by 10:05 AM)   CT CHEST W/O CONTRAST with UCCT1   [facility] CT ([facility])    [address]  1st Floor  [facility]  [address] [phone]           Please bring any scans or X-rays taken at other hospitals, if similar tests were done. Also bring a list of your medicines, including vitamins, minerals and over-the-counter drugs. It is safest to leave personal items at home.  Be sure to tell your doctor:   If you have any allergies.   If there s any chance you are pregnant.   If you are breastfeeding.  You do not need to do anything special to prepare for this exam.  Please wear loose clothing, such as a sweat suit or jogging clothes. Avoid snaps, zippers and other metal. We may ask you to undress and put on a hospital gown.            Feb 23, 2018 10:45 AM CST   Lab with  LAB    [facility])    [address] [place] [address]  1st Floor  [facility] [address]   [phone]            Feb 23, 2018 11:00 AM CST   Nurse Visit with  Txc Nurse   [facility] Solid Organ Transplant ([facility])    [address] [place] [address]  3rd Floor  [facility] [address]   [phone]            Feb 23, 2018 11:30 AM CST   (Arrive by 11:15 AM)   SOT CARE COORDINATOR EVAL with Camilla Antoine RN   [facility] Solid Organ Transplant ([facility])    [address] [place] [address]  [address]  [facility] [address] [phone]            Feb 23, 2018  2:30 PM CST   (Arrive by 2:15 PM)   New Patient Visit with Toby Hernandez MD   [facility] Heart Care ([facility])    [address] [place] [address]  [address]  [facility] [address] [phone]              Future tests that were ordered for you today     Open Future Orders        Priority Expected Expires Ordered    Tacrolimus level Routine 2/23/2018 3/22/2018 2/20/2018    Routine UA with micro reflex to culture Routine 2/23/2018 3/22/2018 2/20/2018    Echo Complete Bubble Routine 2/19/2018 5/28/2018 1/29/2018            Who to contact     If you have questions or need follow up information about  "today's clinic visit or your schedule please contact [facility] directly at [phone].  Normal or non-critical lab and imaging results will be communicated to you by MyChart, letter or phone within 4 business days after the clinic has received the results. If you do not hear from us within 7 days, please contact the clinic through [employer] or phone. If you have a critical or abnormal lab result, we will notify you by phone as soon as possible.  Submit refill requests through [email or web address] or call your pharmacy and they will forward the refill request to us. Please allow 3 business days for your refill to be completed.          Additional Information About Your Visit        [facility] Information     [email or web address] lets you send messages to your doctor, view your test results, renew your prescriptions, schedule appointments and more. To sign up, go to [email or web address] . Click on \"Log in\" on the left side of the screen, which will take you to the Welcome page. Then click on \"Sign up Now\" on the right side of the page.     You will be asked to enter the access code listed below, as well as some personal information. Please follow the directions to create your username and password.     Your access code is: [email or web address]  Expires: 2018  9:48 AM     Your access code will  in 90 days. If you need help or a new code, please call your [facility] clinic or [phone].        Care EveryWhere ID     This is your Care EveryWhere ID. This could be used by other organizations to access your [facility] medical records  [id number]         Blood Pressure from Last 3 Encounters:   18 119/79    Weight from Last 3 Encounters:   18 51.5 kg (113 lb 9.6 oz)   18 54.6 kg (120 lb 4.8 oz)   17 52.2 kg (115 lb)               Primary Care Provider Office Phone # Fax #    Rosy Vu [phone]       [facility]  [address]  [facility] [address]        Equal " Access to Services     [facility]: Hadii patricia moon esteban Lucas, wadarwinda luqpankaj, qamaxta karosmorro beauchamp. So [facility] [phone].    ATENCIÓN: Si habla español, tiene a taylor disposición servicios gratuitos de asistencia lingüística. Llame al [phone].    We comply with applicable federal civil rights laws and [place] laws. We do not discriminate on the basis of race, color, national origin, age, disability, sex, sexual orientation, or gender identity.            Thank you!     Thank you for choosing [facility]PL[facility]  for your care. Our goal is always to provide you with excellent care. Hearing back from our patients is one way we can continue to improve our services. Please take a few minutes to complete the written survey that you may receive in the mail after your visit with us. Thank you!             Your Updated Medication List - Protect others around you: Learn how to safely use, store and throw away your medicines at www.disposemymeds.org.          This list is accurate as of 2/20/18  5:28 PM.  Always use your most recent med list.                   Brand Name Dispense Instructions for use Diagnosis    AZATHIOPRINE PO      Take 50 mg by mouth 2 times daily 50 mg tablet, 1.5 tablets by mouth 2 times a day        calcium carbonate 1250 MG tablet    OS-CHELSEY 500 mg [place]. [place]     Take 1 tablet by mouth 2 times daily        multivitamin, therapeutic with minerals Tabs tablet      Take 1 tablet by mouth daily        nystatin 270290 UNIT/ML suspension    MYCOSTATIN     Take 100,000 Units by mouth 4 times daily 100,000 unit/ ml, take 4- 6 ml by mouth 4 times a day.        order for DME     1 Device    Equipment being ordered: Oxygen 5 liters at rest, 15 liters with exertion.  Needs portability.  Please provide 2 10-liter concentrators for in the home    ILD (interstitial lung disease) (H), Hypoxia       PREDNISONE PO      Take 5 mg by mouth daily         sulfamethoxazole-trimethoprim 800-160 MG per tablet    BACTRIM DS/SEPTRA DS     Take 1 tablet by mouth Every Mon, Wed, Fri Morning        TACROLIMUS PO      Take 0.1 mg by mouth 2 times daily 0.1 mg capsule, take 4 capsules by mouth 2 times a day.

## 2018-02-20 NOTE — PROGRESS NOTES
Met with Kecia to discuss the coronary angiogram/right heart cath scheduled for tomorrow. Will check in at 8:30 am to [facility] Waiting Room.  Reviewed rationale,  procedure and post care. Provided Coronary Angiogram booklet.  Instructed pt to take 325 mg ASA the night before and morning of procedure, per Heart Cath Lab protocol (Does not have aspirin, will take when arrives in am).  K+ 4.0 (WNL).  Instructed patient not to eat or drink after midnight, but could take meds in AM with sips clear liquids.  Her  will be available to drive and care for patient after procedure.    Patient is currently on 15 liters oxygen at rest, and usually sleep at 30 degree incline.  She will discuss with cardiology tomorrow.     Kecia has known pulmonary hypertension, also evidenced on echo. She was on Tyvaso study through [place]--ended due to inability to tolerate Tyvaso.  Dr. Hood has requested that Dr. Pride see her is clinic, anticipate scheduling this Friday.

## 2018-02-21 ENCOUNTER — APPOINTMENT (OUTPATIENT)
Dept: MEDSURG UNIT | Facility: CLINIC | Age: 58
DRG: 003 | End: 2018-02-21
Attending: INTERNAL MEDICINE
Payer: COMMERCIAL

## 2018-02-21 ENCOUNTER — APPOINTMENT (OUTPATIENT)
Dept: GENERAL RADIOLOGY | Facility: CLINIC | Age: 58
DRG: 003 | End: 2018-02-21
Attending: INTERNAL MEDICINE
Payer: COMMERCIAL

## 2018-02-21 ENCOUNTER — HOSPITAL ENCOUNTER (INPATIENT)
Facility: CLINIC | Age: 58
LOS: 31 days | Discharge: NURSING, REHAB OR LONG-TERM CARE | DRG: 003 | End: 2018-03-24
Attending: INTERNAL MEDICINE | Admitting: PEDIATRICS
Payer: COMMERCIAL

## 2018-02-21 ENCOUNTER — APPOINTMENT (OUTPATIENT)
Dept: CARDIOLOGY | Facility: CLINIC | Age: 58
DRG: 003 | End: 2018-02-21
Attending: INTERNAL MEDICINE
Payer: COMMERCIAL

## 2018-02-21 DIAGNOSIS — E83.42 HYPOMAGNESEMIA: ICD-10-CM

## 2018-02-21 DIAGNOSIS — E87.70 HYPERVOLEMIA, UNSPECIFIED HYPERVOLEMIA TYPE: ICD-10-CM

## 2018-02-21 DIAGNOSIS — I82.621 DEEP VEIN THROMBOSIS (DVT) OF OTHER VEIN OF RIGHT UPPER EXTREMITY, UNSPECIFIED CHRONICITY (H): ICD-10-CM

## 2018-02-21 DIAGNOSIS — R93.89 ABNORMAL CHEST CT: ICD-10-CM

## 2018-02-21 DIAGNOSIS — Z94.2 LUNG TRANSPLANT RECIPIENT (H): Primary | ICD-10-CM

## 2018-02-21 DIAGNOSIS — G47.09 OTHER INSOMNIA: ICD-10-CM

## 2018-02-21 DIAGNOSIS — Z94.2 S/P LUNG TRANSPLANT (H): ICD-10-CM

## 2018-02-21 DIAGNOSIS — J96.21 ACUTE ON CHRONIC RESPIRATORY FAILURE WITH HYPOXIA (H): ICD-10-CM

## 2018-02-21 DIAGNOSIS — I95.9 HYPOTENSION, UNSPECIFIED HYPOTENSION TYPE: ICD-10-CM

## 2018-02-21 DIAGNOSIS — Z76.82 ORGAN TRANSPLANT CANDIDATE: ICD-10-CM

## 2018-02-21 DIAGNOSIS — I48.0 PAROXYSMAL ATRIAL FIBRILLATION (H): ICD-10-CM

## 2018-02-21 DIAGNOSIS — J84.9 LUNG DISEASE, INTERSTITIAL (H): ICD-10-CM

## 2018-02-21 DIAGNOSIS — K21.9 GASTROESOPHAGEAL REFLUX DISEASE WITHOUT ESOPHAGITIS: ICD-10-CM

## 2018-02-21 DIAGNOSIS — K59.09 OTHER CONSTIPATION: ICD-10-CM

## 2018-02-21 DIAGNOSIS — J84.9 ILD (INTERSTITIAL LUNG DISEASE) (H): ICD-10-CM

## 2018-02-21 LAB
A* LOCUS NMDP: NORMAL
A* LOCUS: NORMAL
A* NMDP: NORMAL
A*: NORMAL
ABTEST METHOD: NORMAL
ALBUMIN SERPL-MCNC: 3.2 G/DL (ref 3.4–5)
ALBUMIN UR-MCNC: 10 MG/DL
ALP SERPL-CCNC: 58 U/L (ref 40–150)
ALT SERPL W P-5'-P-CCNC: 20 U/L (ref 0–50)
ANION GAP SERPL CALCULATED.3IONS-SCNC: 7 MMOL/L (ref 3–14)
APPEARANCE UR: CLEAR
AST SERPL W P-5'-P-CCNC: 26 U/L (ref 0–45)
B* LOCUS NMDP: NORMAL
B* LOCUS: NORMAL
B* NMDP: NORMAL
B*: NORMAL
BASE EXCESS BLDA CALC-SCNC: 2.3 MMOL/L
BASOPHILS # BLD AUTO: 0.1 10E9/L (ref 0–0.2)
BASOPHILS NFR BLD AUTO: 0.4 %
BILIRUB SERPL-MCNC: 0.7 MG/DL (ref 0.2–1.3)
BILIRUB UR QL STRIP: NEGATIVE
BUN SERPL-MCNC: 16 MG/DL (ref 7–30)
BW-1: NORMAL
C* LOCUS NMDP: NORMAL
C* LOCUS: NORMAL
C* NMDP: NORMAL
C*: NORMAL
CALCIUM SERPL-MCNC: 8.6 MG/DL (ref 8.5–10.1)
CHLORIDE SERPL-SCNC: 107 MMOL/L (ref 94–109)
CO2 SERPL-SCNC: 28 MMOL/L (ref 20–32)
COLOR UR AUTO: ABNORMAL
CREAT SERPL-MCNC: 0.93 MG/DL (ref 0.52–1.04)
DIFFERENTIAL METHOD BLD: ABNORMAL
DPA1* NMDP: NORMAL
DPA1*: NORMAL
DPB1* NMDP: NORMAL
DPB1*: NORMAL
DQA1*: NORMAL
DQA1*LOCUS: NORMAL
DQB1* LOCUS NMDP: NORMAL
DQB1* LOCUS: NORMAL
DQB1* NMDP: NORMAL
DQB1*: NORMAL
DRB1* LOCUS NMDP: NORMAL
DRB1* LOCUS: NORMAL
DRB1* NMDP: NORMAL
DRB1*: NORMAL
DRB3* LOCUS NMDP: NORMAL
DRB3* LOCUS: NORMAL
DRB3* NMDP: NORMAL
DRSSO TEST METHOD: NORMAL
EOSINOPHIL # BLD AUTO: 0.3 10E9/L (ref 0–0.7)
EOSINOPHIL NFR BLD AUTO: 1.9 %
ERYTHROCYTE [DISTWIDTH] IN BLOOD BY AUTOMATED COUNT: 15.6 % (ref 10–15)
GFR SERPL CREATININE-BSD FRML MDRD: 63 ML/MIN/1.7M2
GLUCOSE SERPL-MCNC: 96 MG/DL (ref 70–99)
GLUCOSE UR STRIP-MCNC: NEGATIVE MG/DL
HCO3 BLD-SCNC: 26 MMOL/L (ref 21–28)
HCT VFR BLD AUTO: 43.5 % (ref 35–47)
HGB BLD-MCNC: 14.1 G/DL (ref 11.7–15.7)
HGB UR QL STRIP: NEGATIVE
IMM GRANULOCYTES # BLD: 0 10E9/L (ref 0–0.4)
IMM GRANULOCYTES NFR BLD: 0.3 %
INTERPRETATION ECG - MUSE: NORMAL
KETONES UR STRIP-MCNC: NEGATIVE MG/DL
LACTATE BLD-SCNC: 0.7 MMOL/L (ref 0.7–2)
LEUKOCYTE ESTERASE UR QL STRIP: NEGATIVE
LYMPHOCYTES # BLD AUTO: 1.3 10E9/L (ref 0.8–5.3)
LYMPHOCYTES NFR BLD AUTO: 9.1 %
MCH RBC QN AUTO: 33.9 PG (ref 26.5–33)
MCHC RBC AUTO-ENTMCNC: 32.4 G/DL (ref 31.5–36.5)
MCV RBC AUTO: 105 FL (ref 78–100)
MONOCYTES # BLD AUTO: 1.1 10E9/L (ref 0–1.3)
MONOCYTES NFR BLD AUTO: 8.1 %
MUCOUS THREADS #/AREA URNS LPF: PRESENT /LPF
NEUTROPHILS # BLD AUTO: 11.3 10E9/L (ref 1.6–8.3)
NEUTROPHILS NFR BLD AUTO: 80.2 %
NITRATE UR QL: NEGATIVE
NRBC # BLD AUTO: 0 10*3/UL
NRBC BLD AUTO-RTO: 0 /100
O2/TOTAL GAS SETTING VFR VENT: 70 %
OXYHGB MFR BLD: 95 % (ref 92–100)
PCO2 BLD: 36 MM HG (ref 35–45)
PH BLD: 7.46 PH (ref 7.35–7.45)
PH UR STRIP: 7.5 PH (ref 5–7)
PLATELET # BLD AUTO: 348 10E9/L (ref 150–450)
PO2 BLD: 93 MM HG (ref 80–105)
POTASSIUM SERPL-SCNC: 4.3 MMOL/L (ref 3.4–5.3)
PROT SERPL-MCNC: 7.8 G/DL (ref 6.8–8.8)
RBC # BLD AUTO: 4.16 10E12/L (ref 3.8–5.2)
RBC #/AREA URNS AUTO: 1 /HPF (ref 0–2)
SODIUM SERPL-SCNC: 142 MMOL/L (ref 133–144)
SOURCE: ABNORMAL
SP GR UR STRIP: 1.02 (ref 1–1.03)
SQUAMOUS #/AREA URNS AUTO: 1 /HPF (ref 0–1)
UROBILINOGEN UR STRIP-MCNC: NORMAL MG/DL (ref 0–2)
WBC # BLD AUTO: 14 10E9/L (ref 4–11)
WBC #/AREA URNS AUTO: <1 /HPF (ref 0–2)

## 2018-02-21 PROCEDURE — 71045 X-RAY EXAM CHEST 1 VIEW: CPT

## 2018-02-21 PROCEDURE — 12000006 ZZH R&B IMCU INTERMEDIATE UMMC

## 2018-02-21 PROCEDURE — B2111ZZ FLUOROSCOPY OF MULTIPLE CORONARY ARTERIES USING LOW OSMOLAR CONTRAST: ICD-10-PCS | Performed by: INTERNAL MEDICINE

## 2018-02-21 PROCEDURE — 25000132 ZZH RX MED GY IP 250 OP 250 PS 637: Performed by: STUDENT IN AN ORGANIZED HEALTH CARE EDUCATION/TRAINING PROGRAM

## 2018-02-21 PROCEDURE — 25000128 H RX IP 250 OP 636: Performed by: STUDENT IN AN ORGANIZED HEALTH CARE EDUCATION/TRAINING PROGRAM

## 2018-02-21 PROCEDURE — 81001 URINALYSIS AUTO W/SCOPE: CPT | Performed by: STUDENT IN AN ORGANIZED HEALTH CARE EDUCATION/TRAINING PROGRAM

## 2018-02-21 PROCEDURE — C1894 INTRO/SHEATH, NON-LASER: HCPCS

## 2018-02-21 PROCEDURE — 25000125 ZZHC RX 250: Performed by: STUDENT IN AN ORGANIZED HEALTH CARE EDUCATION/TRAINING PROGRAM

## 2018-02-21 PROCEDURE — 3E043XZ INTRODUCTION OF VASOPRESSOR INTO CENTRAL VEIN, PERCUTANEOUS APPROACH: ICD-10-PCS | Performed by: SURGERY

## 2018-02-21 PROCEDURE — 94660 CPAP INITIATION&MGMT: CPT

## 2018-02-21 PROCEDURE — 99223 1ST HOSP IP/OBS HIGH 75: CPT | Mod: AI | Performed by: PEDIATRICS

## 2018-02-21 PROCEDURE — 83605 ASSAY OF LACTIC ACID: CPT | Performed by: STUDENT IN AN ORGANIZED HEALTH CARE EDUCATION/TRAINING PROGRAM

## 2018-02-21 PROCEDURE — 36415 COLL VENOUS BLD VENIPUNCTURE: CPT | Performed by: STUDENT IN AN ORGANIZED HEALTH CARE EDUCATION/TRAINING PROGRAM

## 2018-02-21 PROCEDURE — 93456 R HRT CORONARY ARTERY ANGIO: CPT | Mod: 26 | Performed by: INTERNAL MEDICINE

## 2018-02-21 PROCEDURE — 36600 WITHDRAWAL OF ARTERIAL BLOOD: CPT

## 2018-02-21 PROCEDURE — 85025 COMPLETE CBC W/AUTO DIFF WBC: CPT | Performed by: STUDENT IN AN ORGANIZED HEALTH CARE EDUCATION/TRAINING PROGRAM

## 2018-02-21 PROCEDURE — 27210787 ZZH MANIFOLD CR2

## 2018-02-21 PROCEDURE — 27210946 ZZH KIT HC TOTES DISP CR8

## 2018-02-21 PROCEDURE — 93456 R HRT CORONARY ARTERY ANGIO: CPT

## 2018-02-21 PROCEDURE — 40000275 ZZH STATISTIC RCP TIME EA 10 MIN

## 2018-02-21 PROCEDURE — 80053 COMPREHEN METABOLIC PANEL: CPT | Performed by: STUDENT IN AN ORGANIZED HEALTH CARE EDUCATION/TRAINING PROGRAM

## 2018-02-21 PROCEDURE — 82805 BLOOD GASES W/O2 SATURATION: CPT | Performed by: STUDENT IN AN ORGANIZED HEALTH CARE EDUCATION/TRAINING PROGRAM

## 2018-02-21 PROCEDURE — 40000281 ZZH STATISTIC TRANSPORT TIME EA 15 MIN

## 2018-02-21 PROCEDURE — 27211089 ZZH KIT ACIST INJECTOR CR3

## 2018-02-21 PROCEDURE — C1769 GUIDE WIRE: HCPCS

## 2018-02-21 PROCEDURE — 25000128 H RX IP 250 OP 636: Performed by: INTERNAL MEDICINE

## 2018-02-21 PROCEDURE — 27210762 ZZH DEVICE SUTURELESS SECUREMENT EA CR2

## 2018-02-21 PROCEDURE — 40000141 ZZH STATISTIC PERIPHERAL IV START W/O US GUIDANCE

## 2018-02-21 PROCEDURE — 25000125 ZZHC RX 250: Performed by: INTERNAL MEDICINE

## 2018-02-21 PROCEDURE — 4A023N6 MEASUREMENT OF CARDIAC SAMPLING AND PRESSURE, RIGHT HEART, PERCUTANEOUS APPROACH: ICD-10-PCS | Performed by: INTERNAL MEDICINE

## 2018-02-21 PROCEDURE — 25000132 ZZH RX MED GY IP 250 OP 250 PS 637: Performed by: INTERNAL MEDICINE

## 2018-02-21 PROCEDURE — 87040 BLOOD CULTURE FOR BACTERIA: CPT | Performed by: STUDENT IN AN ORGANIZED HEALTH CARE EDUCATION/TRAINING PROGRAM

## 2018-02-21 PROCEDURE — 27211181 ZZH BALLOON TIP PRESSURE CR5

## 2018-02-21 PROCEDURE — 27210742 ZZH CATH CR1

## 2018-02-21 PROCEDURE — 27210843 ZZH DEVICE COMPRESSION CR12

## 2018-02-21 PROCEDURE — 25000131 ZZH RX MED GY IP 250 OP 636 PS 637: Performed by: STUDENT IN AN ORGANIZED HEALTH CARE EDUCATION/TRAINING PROGRAM

## 2018-02-21 PROCEDURE — 40000172 ZZH STATISTIC PROCEDURE PREP ONLY

## 2018-02-21 PROCEDURE — 27210893 ZZH CATH CR5

## 2018-02-21 RX ORDER — POTASSIUM CHLORIDE 29.8 MG/ML
20 INJECTION INTRAVENOUS
Status: DISCONTINUED | OUTPATIENT
Start: 2018-02-21 | End: 2018-02-21 | Stop reason: HOSPADM

## 2018-02-21 RX ORDER — POTASSIUM CL/LIDO/0.9 % NACL 10MEQ/0.1L
10 INTRAVENOUS SOLUTION, PIGGYBACK (ML) INTRAVENOUS
Status: DISCONTINUED | OUTPATIENT
Start: 2018-02-21 | End: 2018-02-26

## 2018-02-21 RX ORDER — PREDNISONE 10 MG/1
10 TABLET ORAL DAILY
Status: DISCONTINUED | OUTPATIENT
Start: 2018-02-21 | End: 2018-02-21

## 2018-02-21 RX ORDER — FENTANYL CITRATE 50 UG/ML
25-50 INJECTION, SOLUTION INTRAMUSCULAR; INTRAVENOUS
Status: DISCONTINUED | OUTPATIENT
Start: 2018-02-21 | End: 2018-02-21 | Stop reason: HOSPADM

## 2018-02-21 RX ORDER — POTASSIUM CHLORIDE 29.8 MG/ML
20 INJECTION INTRAVENOUS
Status: DISCONTINUED | OUTPATIENT
Start: 2018-02-21 | End: 2018-02-26

## 2018-02-21 RX ORDER — NALOXONE HYDROCHLORIDE 0.4 MG/ML
.1-.4 INJECTION, SOLUTION INTRAMUSCULAR; INTRAVENOUS; SUBCUTANEOUS
Status: DISCONTINUED | OUTPATIENT
Start: 2018-02-21 | End: 2018-02-21

## 2018-02-21 RX ORDER — LORAZEPAM 2 MG/ML
.5-2 INJECTION INTRAMUSCULAR EVERY 4 HOURS PRN
Status: DISCONTINUED | OUTPATIENT
Start: 2018-02-21 | End: 2018-02-21 | Stop reason: HOSPADM

## 2018-02-21 RX ORDER — AMOXICILLIN 250 MG
1 CAPSULE ORAL 2 TIMES DAILY PRN
Status: DISCONTINUED | OUTPATIENT
Start: 2018-02-21 | End: 2018-02-27

## 2018-02-21 RX ORDER — NALOXONE HYDROCHLORIDE 0.4 MG/ML
.1-.4 INJECTION, SOLUTION INTRAMUSCULAR; INTRAVENOUS; SUBCUTANEOUS
Status: DISCONTINUED | OUTPATIENT
Start: 2018-02-21 | End: 2018-03-01

## 2018-02-21 RX ORDER — ACETAMINOPHEN 325 MG/1
650 TABLET ORAL EVERY 4 HOURS PRN
Status: DISCONTINUED | OUTPATIENT
Start: 2018-02-21 | End: 2018-02-27

## 2018-02-21 RX ORDER — CLOPIDOGREL BISULFATE 75 MG/1
75 TABLET ORAL
Status: DISCONTINUED | OUTPATIENT
Start: 2018-02-21 | End: 2018-02-21 | Stop reason: HOSPADM

## 2018-02-21 RX ORDER — ASPIRIN 81 MG/1
81-324 TABLET, CHEWABLE ORAL
Status: DISCONTINUED | OUTPATIENT
Start: 2018-02-21 | End: 2018-02-21 | Stop reason: HOSPADM

## 2018-02-21 RX ORDER — NITROGLYCERIN 5 MG/ML
100-500 VIAL (ML) INTRAVENOUS
Status: DISCONTINUED | OUTPATIENT
Start: 2018-02-21 | End: 2018-02-21

## 2018-02-21 RX ORDER — POTASSIUM CHLORIDE 1.5 G/1.58G
20-40 POWDER, FOR SOLUTION ORAL
Status: DISCONTINUED | OUTPATIENT
Start: 2018-02-21 | End: 2018-02-26

## 2018-02-21 RX ORDER — DEXTROSE MONOHYDRATE 25 G/50ML
12.5-5 INJECTION, SOLUTION INTRAVENOUS EVERY 30 MIN PRN
Status: DISCONTINUED | OUTPATIENT
Start: 2018-02-21 | End: 2018-02-21 | Stop reason: HOSPADM

## 2018-02-21 RX ORDER — SODIUM NITROPRUSSIDE 25 MG/ML
100-200 INJECTION INTRAVENOUS
Status: DISCONTINUED | OUTPATIENT
Start: 2018-02-21 | End: 2018-02-21 | Stop reason: HOSPADM

## 2018-02-21 RX ORDER — ADENOSINE 3 MG/ML
12-12000 INJECTION, SOLUTION INTRAVENOUS
Status: DISCONTINUED | OUTPATIENT
Start: 2018-02-21 | End: 2018-02-21 | Stop reason: HOSPADM

## 2018-02-21 RX ORDER — TACROLIMUS 1 MG/1
4 CAPSULE ORAL
Status: DISCONTINUED | OUTPATIENT
Start: 2018-02-21 | End: 2018-02-27

## 2018-02-21 RX ORDER — LIDOCAINE HYDROCHLORIDE 10 MG/ML
30 INJECTION, SOLUTION EPIDURAL; INFILTRATION; INTRACAUDAL; PERINEURAL
Status: DISCONTINUED | OUTPATIENT
Start: 2018-02-21 | End: 2018-02-21 | Stop reason: HOSPADM

## 2018-02-21 RX ORDER — METHYLPREDNISOLONE SODIUM SUCCINATE 125 MG/2ML
125 INJECTION, POWDER, LYOPHILIZED, FOR SOLUTION INTRAMUSCULAR; INTRAVENOUS
Status: DISCONTINUED | OUTPATIENT
Start: 2018-02-21 | End: 2018-02-21 | Stop reason: HOSPADM

## 2018-02-21 RX ORDER — POTASSIUM CHLORIDE 750 MG/1
20-40 TABLET, EXTENDED RELEASE ORAL
Status: DISCONTINUED | OUTPATIENT
Start: 2018-02-21 | End: 2018-02-26

## 2018-02-21 RX ORDER — MAGNESIUM SULFATE HEPTAHYDRATE 40 MG/ML
4 INJECTION, SOLUTION INTRAVENOUS EVERY 4 HOURS PRN
Status: DISCONTINUED | OUTPATIENT
Start: 2018-02-21 | End: 2018-02-23

## 2018-02-21 RX ORDER — PREDNISONE 20 MG/1
20 TABLET ORAL DAILY
Status: DISCONTINUED | OUTPATIENT
Start: 2018-02-21 | End: 2018-02-27

## 2018-02-21 RX ORDER — HEPARIN SODIUM 1000 [USP'U]/ML
1000-10000 INJECTION, SOLUTION INTRAVENOUS; SUBCUTANEOUS EVERY 5 MIN PRN
Status: DISCONTINUED | OUTPATIENT
Start: 2018-02-21 | End: 2018-02-21 | Stop reason: HOSPADM

## 2018-02-21 RX ORDER — METOPROLOL TARTRATE 1 MG/ML
5 INJECTION, SOLUTION INTRAVENOUS EVERY 5 MIN PRN
Status: DISCONTINUED | OUTPATIENT
Start: 2018-02-21 | End: 2018-02-21 | Stop reason: HOSPADM

## 2018-02-21 RX ORDER — SODIUM CHLORIDE 9 MG/ML
INJECTION, SOLUTION INTRAVENOUS CONTINUOUS
Status: DISCONTINUED | OUTPATIENT
Start: 2018-02-21 | End: 2018-02-21 | Stop reason: HOSPADM

## 2018-02-21 RX ORDER — FLUMAZENIL 0.1 MG/ML
0.2 INJECTION, SOLUTION INTRAVENOUS
Status: DISCONTINUED | OUTPATIENT
Start: 2018-02-21 | End: 2018-02-21 | Stop reason: HOSPADM

## 2018-02-21 RX ORDER — HYDRALAZINE HYDROCHLORIDE 20 MG/ML
10-20 INJECTION INTRAMUSCULAR; INTRAVENOUS
Status: DISCONTINUED | OUTPATIENT
Start: 2018-02-21 | End: 2018-02-21 | Stop reason: HOSPADM

## 2018-02-21 RX ORDER — NALOXONE HYDROCHLORIDE 0.4 MG/ML
.2-.4 INJECTION, SOLUTION INTRAMUSCULAR; INTRAVENOUS; SUBCUTANEOUS
Status: ACTIVE | OUTPATIENT
Start: 2018-02-21 | End: 2018-02-22

## 2018-02-21 RX ORDER — MAGNESIUM HYDROXIDE 1200 MG/15ML
1000 LIQUID ORAL CONTINUOUS PRN
Status: DISCONTINUED | OUTPATIENT
Start: 2018-02-21 | End: 2018-02-21 | Stop reason: HOSPADM

## 2018-02-21 RX ORDER — ONDANSETRON 4 MG/1
4 TABLET, ORALLY DISINTEGRATING ORAL EVERY 6 HOURS PRN
Status: DISCONTINUED | OUTPATIENT
Start: 2018-02-21 | End: 2018-02-26

## 2018-02-21 RX ORDER — NYSTATIN 100000/ML
500000 SUSPENSION, ORAL (FINAL DOSE FORM) ORAL 4 TIMES DAILY
Status: DISCONTINUED | OUTPATIENT
Start: 2018-02-21 | End: 2018-02-27

## 2018-02-21 RX ORDER — AZATHIOPRINE 50 MG/1
50 TABLET ORAL 2 TIMES DAILY
Status: DISCONTINUED | OUTPATIENT
Start: 2018-02-21 | End: 2018-02-22

## 2018-02-21 RX ORDER — LIDOCAINE 40 MG/G
CREAM TOPICAL
Status: DISCONTINUED | OUTPATIENT
Start: 2018-02-21 | End: 2018-02-21 | Stop reason: HOSPADM

## 2018-02-21 RX ORDER — DOBUTAMINE HYDROCHLORIDE 200 MG/100ML
2-20 INJECTION INTRAVENOUS CONTINUOUS PRN
Status: DISCONTINUED | OUTPATIENT
Start: 2018-02-21 | End: 2018-02-21 | Stop reason: HOSPADM

## 2018-02-21 RX ORDER — LIDOCAINE 40 MG/G
CREAM TOPICAL
Status: DISCONTINUED | OUTPATIENT
Start: 2018-02-21 | End: 2018-02-21

## 2018-02-21 RX ORDER — DOPAMINE HYDROCHLORIDE 160 MG/100ML
2-20 INJECTION, SOLUTION INTRAVENOUS CONTINUOUS PRN
Status: DISCONTINUED | OUTPATIENT
Start: 2018-02-21 | End: 2018-02-21 | Stop reason: HOSPADM

## 2018-02-21 RX ORDER — NICARDIPINE HYDROCHLORIDE 2.5 MG/ML
100 INJECTION INTRAVENOUS
Status: DISCONTINUED | OUTPATIENT
Start: 2018-02-21 | End: 2018-02-21 | Stop reason: HOSPADM

## 2018-02-21 RX ORDER — ARGATROBAN 1 MG/ML
150 INJECTION, SOLUTION INTRAVENOUS
Status: DISCONTINUED | OUTPATIENT
Start: 2018-02-21 | End: 2018-02-21 | Stop reason: HOSPADM

## 2018-02-21 RX ORDER — IOPAMIDOL 755 MG/ML
70 INJECTION, SOLUTION INTRAVASCULAR ONCE
Status: COMPLETED | OUTPATIENT
Start: 2018-02-21 | End: 2018-02-21

## 2018-02-21 RX ORDER — FLUMAZENIL 0.1 MG/ML
0.2 INJECTION, SOLUTION INTRAVENOUS
Status: ACTIVE | OUTPATIENT
Start: 2018-02-21 | End: 2018-02-22

## 2018-02-21 RX ORDER — ATROPINE SULFATE 0.1 MG/ML
0.5 INJECTION INTRAVENOUS EVERY 5 MIN PRN
Status: ACTIVE | OUTPATIENT
Start: 2018-02-21 | End: 2018-02-22

## 2018-02-21 RX ORDER — PROMETHAZINE HYDROCHLORIDE 25 MG/ML
6.25-25 INJECTION, SOLUTION INTRAMUSCULAR; INTRAVENOUS EVERY 4 HOURS PRN
Status: DISCONTINUED | OUTPATIENT
Start: 2018-02-21 | End: 2018-02-21 | Stop reason: HOSPADM

## 2018-02-21 RX ORDER — NALOXONE HYDROCHLORIDE 0.4 MG/ML
0.4 INJECTION, SOLUTION INTRAMUSCULAR; INTRAVENOUS; SUBCUTANEOUS EVERY 5 MIN PRN
Status: DISCONTINUED | OUTPATIENT
Start: 2018-02-21 | End: 2018-02-21 | Stop reason: HOSPADM

## 2018-02-21 RX ORDER — PHENYLEPHRINE HCL IN 0.9% NACL 1 MG/10 ML
20-100 SYRINGE (ML) INTRAVENOUS
Status: DISCONTINUED | OUTPATIENT
Start: 2018-02-21 | End: 2018-02-21 | Stop reason: HOSPADM

## 2018-02-21 RX ORDER — VERAPAMIL HYDROCHLORIDE 2.5 MG/ML
1-5 INJECTION, SOLUTION INTRAVENOUS
Status: DISCONTINUED | OUTPATIENT
Start: 2018-02-21 | End: 2018-02-21

## 2018-02-21 RX ORDER — EPINEPHRINE 1 MG/ML
0.3 INJECTION, SOLUTION, CONCENTRATE INTRAVENOUS
Status: DISCONTINUED | OUTPATIENT
Start: 2018-02-21 | End: 2018-02-21 | Stop reason: HOSPADM

## 2018-02-21 RX ORDER — ATROPINE SULFATE 0.1 MG/ML
.5-1 INJECTION INTRAVENOUS
Status: DISCONTINUED | OUTPATIENT
Start: 2018-02-21 | End: 2018-02-21 | Stop reason: HOSPADM

## 2018-02-21 RX ORDER — NITROGLYCERIN 5 MG/ML
100-200 VIAL (ML) INTRAVENOUS
Status: DISCONTINUED | OUTPATIENT
Start: 2018-02-21 | End: 2018-02-21 | Stop reason: HOSPADM

## 2018-02-21 RX ORDER — LIDOCAINE 40 MG/G
CREAM TOPICAL
Status: DISCONTINUED | OUTPATIENT
Start: 2018-02-21 | End: 2018-03-01

## 2018-02-21 RX ORDER — POTASSIUM CHLORIDE 7.45 MG/ML
10 INJECTION INTRAVENOUS
Status: DISCONTINUED | OUTPATIENT
Start: 2018-02-21 | End: 2018-02-26

## 2018-02-21 RX ORDER — PROCHLORPERAZINE 25 MG
25 SUPPOSITORY, RECTAL RECTAL EVERY 12 HOURS PRN
Status: DISCONTINUED | OUTPATIENT
Start: 2018-02-21 | End: 2018-02-27

## 2018-02-21 RX ORDER — CLOPIDOGREL BISULFATE 75 MG/1
300-600 TABLET ORAL
Status: DISCONTINUED | OUTPATIENT
Start: 2018-02-21 | End: 2018-02-21 | Stop reason: HOSPADM

## 2018-02-21 RX ORDER — SULFAMETHOXAZOLE/TRIMETHOPRIM 800-160 MG
1 TABLET ORAL
Status: DISCONTINUED | OUTPATIENT
Start: 2018-02-21 | End: 2018-02-27

## 2018-02-21 RX ORDER — POTASSIUM CHLORIDE 7.45 MG/ML
10 INJECTION INTRAVENOUS
Status: DISCONTINUED | OUTPATIENT
Start: 2018-02-21 | End: 2018-02-21 | Stop reason: HOSPADM

## 2018-02-21 RX ORDER — PRASUGREL 10 MG/1
10-60 TABLET, FILM COATED ORAL
Status: DISCONTINUED | OUTPATIENT
Start: 2018-02-21 | End: 2018-02-21 | Stop reason: HOSPADM

## 2018-02-21 RX ORDER — PROTAMINE SULFATE 10 MG/ML
1-5 INJECTION, SOLUTION INTRAVENOUS
Status: DISCONTINUED | OUTPATIENT
Start: 2018-02-21 | End: 2018-02-21 | Stop reason: HOSPADM

## 2018-02-21 RX ORDER — PROTAMINE SULFATE 10 MG/ML
25-100 INJECTION, SOLUTION INTRAVENOUS EVERY 5 MIN PRN
Status: DISCONTINUED | OUTPATIENT
Start: 2018-02-21 | End: 2018-02-21 | Stop reason: HOSPADM

## 2018-02-21 RX ORDER — EPTIFIBATIDE 2 MG/ML
1 INJECTION, SOLUTION INTRAVENOUS CONTINUOUS PRN
Status: DISCONTINUED | OUTPATIENT
Start: 2018-02-21 | End: 2018-02-21 | Stop reason: HOSPADM

## 2018-02-21 RX ORDER — NITROGLYCERIN 0.4 MG/1
0.4 TABLET SUBLINGUAL EVERY 5 MIN PRN
Status: DISCONTINUED | OUTPATIENT
Start: 2018-02-21 | End: 2018-02-21 | Stop reason: HOSPADM

## 2018-02-21 RX ORDER — DIPHENHYDRAMINE HYDROCHLORIDE 50 MG/ML
25-50 INJECTION INTRAMUSCULAR; INTRAVENOUS
Status: DISCONTINUED | OUTPATIENT
Start: 2018-02-21 | End: 2018-02-21 | Stop reason: HOSPADM

## 2018-02-21 RX ORDER — NIFEDIPINE 10 MG/1
10 CAPSULE ORAL
Status: DISCONTINUED | OUTPATIENT
Start: 2018-02-21 | End: 2018-02-21 | Stop reason: HOSPADM

## 2018-02-21 RX ORDER — ONDANSETRON 2 MG/ML
4 INJECTION INTRAMUSCULAR; INTRAVENOUS EVERY 4 HOURS PRN
Status: DISCONTINUED | OUTPATIENT
Start: 2018-02-21 | End: 2018-02-21 | Stop reason: HOSPADM

## 2018-02-21 RX ORDER — ONDANSETRON 2 MG/ML
4 INJECTION INTRAMUSCULAR; INTRAVENOUS EVERY 6 HOURS PRN
Status: DISCONTINUED | OUTPATIENT
Start: 2018-02-21 | End: 2018-02-26

## 2018-02-21 RX ORDER — NITROGLYCERIN 20 MG/100ML
.07-2 INJECTION INTRAVENOUS CONTINUOUS PRN
Status: DISCONTINUED | OUTPATIENT
Start: 2018-02-21 | End: 2018-02-21 | Stop reason: HOSPADM

## 2018-02-21 RX ORDER — SODIUM CHLORIDE 9 MG/ML
INJECTION, SOLUTION INTRAVENOUS CONTINUOUS
Status: DISCONTINUED | OUTPATIENT
Start: 2018-02-21 | End: 2018-02-22

## 2018-02-21 RX ORDER — PROCHLORPERAZINE MALEATE 5 MG
10 TABLET ORAL EVERY 6 HOURS PRN
Status: DISCONTINUED | OUTPATIENT
Start: 2018-02-21 | End: 2018-02-27

## 2018-02-21 RX ORDER — ARGATROBAN 1 MG/ML
350 INJECTION, SOLUTION INTRAVENOUS
Status: DISCONTINUED | OUTPATIENT
Start: 2018-02-21 | End: 2018-02-21 | Stop reason: HOSPADM

## 2018-02-21 RX ORDER — FUROSEMIDE 10 MG/ML
20-100 INJECTION INTRAMUSCULAR; INTRAVENOUS
Status: DISCONTINUED | OUTPATIENT
Start: 2018-02-21 | End: 2018-02-21 | Stop reason: HOSPADM

## 2018-02-21 RX ORDER — ASPIRIN 81 MG/1
81 TABLET ORAL DAILY
Status: DISCONTINUED | OUTPATIENT
Start: 2018-02-22 | End: 2018-02-27

## 2018-02-21 RX ORDER — AMOXICILLIN 250 MG
2 CAPSULE ORAL 2 TIMES DAILY PRN
Status: DISCONTINUED | OUTPATIENT
Start: 2018-02-21 | End: 2018-02-27

## 2018-02-21 RX ORDER — EPTIFIBATIDE 2 MG/ML
180 INJECTION, SOLUTION INTRAVENOUS EVERY 10 MIN PRN
Status: DISCONTINUED | OUTPATIENT
Start: 2018-02-21 | End: 2018-02-21 | Stop reason: HOSPADM

## 2018-02-21 RX ORDER — POLYETHYLENE GLYCOL 3350 17 G/17G
17 POWDER, FOR SOLUTION ORAL DAILY PRN
Status: DISCONTINUED | OUTPATIENT
Start: 2018-02-21 | End: 2018-02-27

## 2018-02-21 RX ORDER — ASPIRIN 325 MG
325 TABLET ORAL
Status: DISCONTINUED | OUTPATIENT
Start: 2018-02-21 | End: 2018-02-21 | Stop reason: HOSPADM

## 2018-02-21 RX ORDER — ACETAMINOPHEN 325 MG/1
325-650 TABLET ORAL EVERY 4 HOURS PRN
Status: DISCONTINUED | OUTPATIENT
Start: 2018-02-21 | End: 2018-02-21

## 2018-02-21 RX ORDER — ENALAPRILAT 1.25 MG/ML
1.25-2.5 INJECTION INTRAVENOUS
Status: DISCONTINUED | OUTPATIENT
Start: 2018-02-21 | End: 2018-02-21 | Stop reason: HOSPADM

## 2018-02-21 RX ADMIN — SODIUM CHLORIDE: 9 INJECTION, SOLUTION INTRAVENOUS at 15:06

## 2018-02-21 RX ADMIN — HEPARIN SODIUM 5000 UNITS: 1000 INJECTION, SOLUTION INTRAVENOUS; SUBCUTANEOUS at 11:21

## 2018-02-21 RX ADMIN — TACROLIMUS 4 MG: 1 CAPSULE ORAL at 18:49

## 2018-02-21 RX ADMIN — IOPAMIDOL 70 ML: 755 INJECTION, SOLUTION INTRAVASCULAR at 12:00

## 2018-02-21 RX ADMIN — ENOXAPARIN SODIUM 40 MG: 40 INJECTION SUBCUTANEOUS at 16:52

## 2018-02-21 RX ADMIN — ASPIRIN 325 MG: 325 TABLET, DELAYED RELEASE ORAL at 09:51

## 2018-02-21 RX ADMIN — AZATHIOPRINE 50 MG: 50 TABLET ORAL at 18:50

## 2018-02-21 RX ADMIN — HEPARIN SODIUM 500 UNITS: 1000 INJECTION, SOLUTION INTRAVENOUS; SUBCUTANEOUS at 11:22

## 2018-02-21 RX ADMIN — SULFAMETHOXAZOLE AND TRIMETHOPRIM 1 TABLET: 800; 160 TABLET ORAL at 18:49

## 2018-02-21 RX ADMIN — PREDNISONE 20 MG: 20 TABLET ORAL at 16:52

## 2018-02-21 RX ADMIN — VERAPAMIL HYDROCHLORIDE 5 MG: 2.5 INJECTION, SOLUTION INTRAVENOUS at 11:21

## 2018-02-21 RX ADMIN — NITROGLYCERIN 200 MCG: 5 INJECTION, SOLUTION INTRAVENOUS at 11:20

## 2018-02-21 ASSESSMENT — ACTIVITIES OF DAILY LIVING (ADL)
COGNITION: 0 - NO COGNITION ISSUES REPORTED
DRESS: 0-->INDEPENDENT
ADLS_ACUITY_SCORE: 9
RETIRED_COMMUNICATION: 0-->UNDERSTANDS/COMMUNICATES WITHOUT DIFFICULTY
RETIRED_EATING: 0-->INDEPENDENT
AMBULATION: 0-->INDEPENDENT
SWALLOWING: 0-->SWALLOWS FOODS/LIQUIDS WITHOUT DIFFICULTY
BATHING: 0-->INDEPENDENT
FALL_HISTORY_WITHIN_LAST_SIX_MONTHS: NO
TOILETING: 0-->INDEPENDENT
ADLS_ACUITY_SCORE: 15
TRANSFERRING: 0-->INDEPENDENT

## 2018-02-21 NOTE — H&P
INTERNAL MEDICINE HISTORY & PHYSICAL   Kecia Blue ([id number]) admitted on 2/21/2018  Primary care provider: Rosy Vu         Chief Complaint:     Shortness of breath         History of Present Illness     History is obtained primarily from patient's , patient, and medical record.    Kecia Blue is a 57 year old female with history of dermatomyositis, seronegative RA, ILD, and pulmonary hypertension who is undergoing lung transplant workup and is admitted from cath lab for increasing shortness of breath and hypoxia. She was initially diagnosed with dermatomyositis in 2014 and ILD in 2015. Her  states that over the past 1-2 months, her breathing has gotten much worse. She is now short of breath with any and all activity, including talking, and is requiring more O2. Previously, she was requiring 3-5L O2, but over the past 1-2 months she has been using 10-15L by hooking her tanks up in series.    She denies any worsened cough, sputum production, hemoptysis, fevers/chills, rash, diarrhea, dysuria. Also denies any increased edema, asymmetric leg swelling, orthopnea, PND, abdominal bloating. She has grandchildren who have been around with mild colds, but tries to stay away from anyone who is overtly ill. She has been losing weight as her PO intake has been poor with her increasing SOB. It is difficult to eat due to dyspnea.     Her day-to-day at home is pretty monotonous, as she is not able to tolerate any activity. She does not really leave the house. She and her  state she has had a lot of activity over the past 2-3 days with the transplant workup and testing, and that she is getting very fatigued. They state that her O2 sats have probably been in the low-mid 80's at home, but they have a hard time getting a reading with their home oximeter. They also state that her breathing pattern is consistent with fast, shallow breaths.     Today, she was undergoing a routine left  and right heart cath for her transplant workup. She was consistently hypoxic despite 15L O2. By the end of the procedure her sats were in the 70's. The cath was completed, and she was then placed on BiPAP at 14/8 and 70% FiO2, resulting in improvement of her SaO2 to ~95%. She will be admitted to medicine for further management of her acute on chronic respiratory failure and pulmonary consultation, as she is not safe to return home with current O2 needs.          Past Medical History     Past Medical History:   Diagnosis Date     Antisynthetase syndrome (H) 2014     Chronic cough      Dermatomyositis (H)      Dysplasia of cervix, low grade (ESTRADA 1)      ILD (interstitial lung disease) (H)      Osteopenia      Pulmonary hypertension      Raynaud's disease      Seronegative rheumatoid arthritis (H)            Past Surgical History     Past Surgical History:   Procedure Laterality Date     ENT SURGERY      tonsillectomy as a child     no prior surgery              Medications     No current facility-administered medications on file prior to encounter.   Current Outpatient Prescriptions on File Prior to Encounter:  order for DME Equipment being ordered: Oxygen 5 liters at rest, 15 liters with exertion.  Needs portability.  Please provide 2 10-liter concentrators for in the home   AZATHIOPRINE PO Take 50 mg by mouth 2 times daily 50 mg tablet, 1.5 tablets by mouth 2 times a day   calcium carbonate (OS-CHELSEY 500 MG [place]. [place]) 1250 MG tablet Take 1 tablet by mouth 2 times daily   multivitamin, therapeutic with minerals (THERA-VIT-M) TABS tablet Take 1 tablet by mouth daily   PREDNISONE PO Take 5 mg by mouth daily   sulfamethoxazole-trimethoprim (BACTRIM DS/SEPTRA DS) 800-160 MG per tablet Take 1 tablet by mouth Every Mon, Wed, Fri Morning   TACROLIMUS PO Take 0.1 mg by mouth 2 times daily 0.1 mg capsule, take 4 capsules by mouth 2 times a day.   nystatin (MYCOSTATIN) 869596 UNIT/ML suspension Take 100,000 Units by mouth 4 times  "daily 100,000 unit/ ml, take 4- 6 ml by mouth 4 times a day.            Allergies   No Known Allergies         Social History   Patient [place] away from the [facility].     Tobacco: Never smoker  EtOH: rare  Other drugs: none    Exposure history: No asbestos, no TB, no pets, no hot tub, no radiation. 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     Social History     Social History     Marital status:      Spouse name: N/A     Number of children: N/A     Years of education: N/A     Occupational History     Not on file.     Social History Main Topics     Smoking status: Never Smoker     Smokeless tobacco: Never Used     Alcohol use 0.6 oz/week     1 Glasses of wine per week      Comment: 1 glass 3 times weekly     Drug use: No     Sexual activity: Not on file     Other Topics Concern     Parent/Sibling W/ Cabg, Mi Or Angioplasty Before 65f 55m? No     Social History Narrative            Family History     Family History   Problem Relation Age of Onset     Hypertension Mother      Arthritis Mother      Pancreatic Cancer Father      DIABETES Father             Review of Systems   Complete 10-pt ROS negative except as noted in HPI         Vitals and Exam     Physical exam:  BP (!) 136/101 (BP Location: Right leg)  Pulse 88  Temp 98.1  F (36.7  C) (Axillary)  Resp (!) 31  Ht 1.6 m (5' 3\")  Wt 51.3 kg (113 lb)  SpO2 96%  BMI 20.02 kg/m2  Wt Readings from Last 2 Encounters:   02/21/18 51.3 kg (113 lb)   02/20/18 51.5 kg (113 lb 9.6 oz)        Physical Exam:   General: Thin woman, lying in bed w/ BiPAP in place, no acute distress  HEENT: Sclerae non-icteric. MMM. PERRL, EOMI.  Neck: No JVD  Cardiac: Normal rate, regular rhythm. No m/r/g. Normal S1, S2.  Pulm: BiPAP in place. Tachypneic with shallow breaths. Mild increased work of breathing. Able to speak in short full sentences. Fine crackles heard diffusely throughout bilaterally. No coarse crackles, " no focal areas of decreased breath sounds, no wheezing.   Abd: Soft, non distended, non tender abdomen. No hepatosplenomegaly.  Skin: No jaundice, no rash.  Extremities: No LE edema. Possible clubbing of hands. Significant cyanosis of toes bilaterally with poor capillary refill. Feet are cold to the touch. Hands are warm.   Joints: No inflammation noted on joints.  Neuro: A&Ox3, no focal deficits. Normal speech.   Psych: Euthymic mood, full affect         Labs/Imaging   Today:            \ 14.1 /   14.0 --------- 348  ANC 11.3    Labs from 2/19/18:  141  106   20   /  ----------------------    4.0    28   1.02 \  eGFR 56    Ca 7  Mg 1.5    Alb 3.4  Tprot 8.1  Tbili 0.6  Alk phos 60  AST 27  ALT 24              \ 13.9 /   11.9 ---------- 349  ANC 9.7  INR 1.01    UA: 30 protein, small blood, trace LE, 5 WBC, few bacteria, mucous, 7 hyaline casts    CMV >8   EBV >8  HSV-1 IgG >8  HSV-2 IgG negative  VZV 3.8 (H)   HAV Ab reactive  HBsAg nonreactive  HBsAb 0.64  HBcAb nonreactive  HCAb nonreactive    Micro  Blood: x2 2/21/18 pending  Urine: pending    Imaging/procedure results:  CXR 2/21/18:   Impression:  1. Bilateral, basilar predominant reticulonodular opacities,  consistent with known history of interstitial lung disease. Findings  appear similar to the prior exam on 2/19/2018.  2. No definite superimposed acute airspace disease.    NM lung scan perfusion study 2/19/18:   Impression:  1. Quantitative evaluation shows 57% contribution of the right lung as  compared to 43% contribution of the left lung.  2. Predominantly basilar perfusion abnormalities of the lungs.    PFT 1/5/18:   FVC 1.17L (35% pred)  FEV1 0.92 (35% pred)  FEV1/FVC 79  FRC 1.53L (57% pred)  TLC 2.57L (52% pred)  DLCO 6.09 (27% pred)    Left/Right heart cath 2/21/18:   HEMODYNAMICS:  BSA 1.5  1. HR 91 bpm  2. /94/120  mmHg  3. RA 5   4. RV 95/5  5. PA 95/48/61   6. PCW 12   7. PA sat 51.6%   8. PCW sat unable to obtain  9. Hgb 13 g/dL    10. Valdemar CO 2.7   11. Valdemar CI 1.8   12. TD CO 3.6   13. TD CI 2.4   14. PVR 13.6     CORONARY ANGIOGRAM:   1. Both coronary arteries arise from their respective cusps.  2. Right dominant.  3. LM is without angiographic evidence of disease.   4. LAD is a type 3 vessel and gives rise to septal perforators, large bifurcating D1.  The pLAD has a 0% stenosis, mLAD has a 0% stenosis there is a myocardial bridge in the mid LAD, dLAD has a 0% stenosis, D1 has a 0% stenosis.  5. LCX gives rise to OM1 and OM2 vessels.  The pLCx has a 0% stenosis, mLCx has a 0% stenosis, dLCx has a 0% stenosis, OM1 has a 0% stenosis and OM2 has a 0% stenosis.    6. RCA gives rise to PL branches and supplies PDA. The pRCA has a 0% stenosis, mRCA has a 0% stenosis, dRCA has a 0% stenosis, RPDA has a <25% ostial stenosis and RPLA has a 0% stenosis.       COMPLICATIONS:  1. None     SUMMARY:   1. Normal right and left sided filling pressures.  2. Severe secondary pulmonary hypertension.  3. Reduced cardiac index by Valdemar (1.8).  4. Minimal coronary artery disease.     PLAN:   1. Aspirin 81 mg po daily lifelong.  2. Bedrest per protocol.  3. Given patient's tenuous respiratory status and inability to maintain adequate oxygen saturation despite using two oxygen tanks, plan for admission for observation to medicine. Discussed with admitting medicine attending and Dr Hood.  4. Continued medical management and lifestyle modification for cardiovascular risk factor optimization.     TTE 2/19/18:   Interpretation Summary  Left ventricular size is normal. Hyperdynamic LV function EF 65%-70%  Mild right ventricular dilation is present. Global right ventricular function  appears mildly reduced (unable to visualize RV well).  Severe pulmonary hypertension. Right ventricular systolic pressure is 80mmHg  above the right atrial pressure (RAP). Unable to assess RAP  Flattened septum is consistent with right ventricular pressure and volume  overload.  Mild to  moderate tricuspid insufficiency is present.  Small PFO demonstrated by bubble study at rest and with valsalva maneuver  Trivial pericardial effusion is present.  Previous study not available for comparison.    EKG 2/20/18:       ASSESSMENT & PLAN :     57 year old female with history of dermatomyositis, seronegative RA, ILD, and pulmonary hypertension who is undergoing lung transplant workup and is admitted from cath lab for increasing shortness of breath and hypoxia.    # Acute on chronic hypoxic respiratory failure  # ILD related to dermatomyositis, undergoing transplant evaluation  # Severe pulmonary hypertension (WHO-III)  Suspect worsening hypoxia is related to progression of disease. No evidence of acute infection, however given immunocompromised status, will complete infectious workup. PE is also on differential given sedentary lifestyle most recently, hypoxia, and tachypnea, and at this point progression of disease seems more likely. No history of L heart failure, and cath today with clean coronaries and normal left-sided pressures. She does have severe pHTN, but no evidence of volume overload on exam, and in fact seems dry. Her transplant workup is essentially complete except for pH manometry study, which will likely not be performed due to respiratory status.   -- Infectious workup: blood cultures, UA/UC, CXR, sputum cultures  -- Pulm consult, appreciate involvement --> patient will be presented at transplant conference tomorrow, 2/22/18  -- Supplemental O2, wean as able; HFNC w/ goal SaO2 88%   -- Increase prednisone to 20mg qday, no higher due to transplant workup  -- Continue azathioprine, tacrolimus at current doses  -- Continue Bactrim and Nystatin    ===Chronic conditions===  # Rheumatoid arthritis: Continue immunosuppressive meds as above    FEN: Bolus prn  Lytes replacement protocol  Regular diet  Prophylaxis:  DVT: Lovenox   GI: None  Disposition: Inpatient status, suspect hospitalization may  be prolonged given current oxygen needs  Code Status: FULL CODE    Kiersten Eli MD  Internal Medicine PGY-2  P: [phone]    Patient was seen and discussed with attending physician Dr. Araya who agrees with above assessment and plan.    I have seen this patient with the resident teaching team,  examined patient independently, and agree with above note and exam.    Morgan Araya MD  Med-Peds Hospitalist, pager [phone]

## 2018-02-21 NOTE — LETTER
2018    Kecia Blue  [address] [place] [address]      Dear Ms. Blue,    This letter is sent to confirm that you have completed your transplant work-up and you are a candidate in the lung transplant program at the [facility].  You were placed on the lung active waitlist on 2018.      When you are active on the waitlist and an organ becomes available, a coordinator will need to speak to you immediately.  You could be contacted at any time during the day or night as an organ could become available at any time.  Please make certain our office always has your current telephone numbers and address.      Items we will need from you:      We have received approval from your insurance company for the transplant procedure.  It is critical that you notify us if there is any change in your insurance.  It is also important that you familiarize yourself with the details of your specific insurance policy.  Our patient  is available to assist you if you should have any questions regarding your coverage.      An ALA or PRA blood sample may need to be sent here every 3 to 6 months to match you with  donors or any potential living donors.  If you need this testing, special mailing boxes (called mailers) will be sent to you directly from the Outreach Department.  You should take the physician order form and the  to your home laboratory when it is time to for this testing to be done.  Additional mailers can be obtained by calling the Transplant Office and asking to speak to a lung .      During this waiting period, we may request additional periodic laboratory tests with your primary physician.  It will be your responsibility to remind your physician to forward your results to the Transplant Office.      We need to be kept informed of any changes in your medical condition such as:    o changes in your  medications,   o significant changes in your health  o significant infections (such as pneumonia or abscesses)  o blood transfusions  o any condition which requires hospitalization  o any surgery      Remember to complete any routine cancer screening tests required before your transplant.  This includes colonoscopy; prostate screening for men, and mammogram and gynecologic testing for women, as well as dental work.  Your primary care clinic can assist you with arranging for these exams.  Remind your caregivers to forward copies of the records and final reports.    We want you to know that our program has physician and surgeon coverage 24 hours a day, 365 days a year. If this coverage changes or there are substantial program changes, you will be notified in writing by letter.     Attached is a letter from the [employer] ([employer]). It describes the services and information offered to patients by [employer] and the Organ Procurement and Transplantation Network.    We appreciate having had the opportunity to participate in your care.  If you have questions, please feel free to call the Transplant Office at [phone] or [phone].      Sincerely,      Solid Organ Transplant  [facility]    Enclosures: [employer] Letter  cc: Care Team        MD Carlton Stanley MD Laura Weerheim, NP

## 2018-02-21 NOTE — PROGRESS NOTES
Patient of Dr. Hood seen in clinic for psychosocial assessment.   60 minutes spent with patient. 100% of visit consisted of counseling related to issues surrounding ILD and lung transplant.    Psychosocial Assessment   Name: Kecia Blue     MRN:  [id number]        Patient Name / Age / Race: Kecia Blue/ 57 year old/    Source of Information: Patient and spouse, Ranjan   : Redd Ruiz [facility]   Interview Date: February 20, 2018   Reason for Referral: Lung Transplant     Current Living Situation   Location (City/State): [address] [place] [address] [place] [address]   With Whom: 56 year old spouse, Ranjan   Type of Home: single family, 2 story with basement.  Everything patient needs is on main floor.  Does not do stairs.    Working Phone? Yes    Three Pronged Outlet? n/a   Distance from Hospital: 2 1/2 hour drive   Need to Relocate Post Surgery? Yes    Discussed? Yes      Vocational/Employment/Financial   Employment: Works as  for their farm.     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     Education: High school + vocational training   ? No   Income: salary/wages and other: farm income   Insurance: Private Insurance   Name of Private Insurance: Preferred one.     Medication Coverage: patient has $6500 out of pocket for all medical expenses.  Then all meds and other costs are covered at 100%    In current situation, pt. can afford medication and supply costs:  Yes    Other Financial Concerns/Issues: Will have high deductible every year, but feel they can afford.  patient does not qualify for SSDI at this time, as she continues to collect a salary above the amount that would make her eligible.       Family/Social Support   Marital Status:    Partner Name: Ranjan   Length of Time : 36 years   Partner s Employment: Works full time as .     Relationship: stable/supportive   Children: 3 adult children, Yudelka (35)  Evelyn  (32) and Nasreen (30).  All live close by.  Evelyn and Nasreen are  with kids.  3 grandchildren ages. 2-7.     Parents: 75 year old mother, Migdalia lives near by.  In good health.     Siblings: 1 sister.  Good relationship.  Lives in [place], [place]   Other Family or Friends Close by: Ranjan's family, many friends.     Support System: available, helpful   Primary Support Person: Ranjan   Issues: Would be difficult for Ranjan to be away from home during planting and harvest.  He will be primary caregiver, but need back up plan in case transplant falls during that time.  Couple is certain that their daughters will assist, as well as other friends and family.        Activities/Functional Ability   Current Level: independent with ADL's, but with great effort.     Daily Activities: Very limited.    On 15 liters of O2.  Can move from bed to bath, and bed to chair in living room.  Spends most of the day in the chair.  Significantly short of breath with activity or talking.     Transportation: Family.       Medical Status   Patient s understanding of need for surgical intervention: Feels that she could die soon without a transplant.  Feels as though condition rapidly deteriorating.  Hard not to feel desperate for transplant.     Advanced Directive? Yes     Details: copy in chart   Adherence History: very   Ability to Adhere to Complex Medical Regime: No concerns     Behavioral   Chemical Dependency Issues: No.  Used to have occasional drink.  Since began discussing transplant has not had any.   Discussed need for abstinence post transplant. Patient agreeable. Does not think this will be problematic.       Smoker? No.  Never. No nicotine products. No e-cigs.     Psychiatric impairment: No   Negative mental health history.     Coping Style/Strategies: Spending time with family, especially her grand children, eases stress.     Recent Legal History: No   Teaching Completed During Assessment Related To Transplant: 1. Housing and  relocation needs post surgery.  2. Caregiver needs post surgery.  3. Financial issues related to surgery.  4. Risks of alcohol use post surgery.  5. Common psychosocial stressors pre/post surgery.  6. Support group availability.   Psychosocial Risks Reviewed Related To Transplant: 1. Increased stress related to your emotional, family, social, employment, or financial situation.  2. Affect on work and/or disability benefits.  3. Affect on future health and life insurance.  4. Outcome expectations may not be met.  5. Mental Health risks: anxiety, depression, PTSD, guilt, grief and chronic fatigue.     Observed Behavior   Well informed? Yes  Angry? No   Process info well? Yes  Hostile? No   Evasive? No Oriented X3? Yes    Cautious/Suspicious? No Motivated? Yes    Appropriate Behavior? Yes  Depressed? No   Appropriate Affect? Yes  Anxious? No   Interview Observations: Candid, open.  Fearful of condition.  Has good understanding of risks/benefits of transplant.       Selection Criteria Met   Plan for Support Yes     Chemical Dependence Yes    Smoking Yes    Mental Health Yes    Adequate Finances Yes      Risk Issues:    None identified.      Final Evaluation/Assessment:     High deductible but can afford.   very supportive, as well as other family.  No psychosocial contraindications to transplant identified.  Excellent support, coping skills.  No financial or insurance.  No psychiatric or CD issues.      Patient understands risks and benefits of Lung Transplant: Yes     Recommendation:    excellent    Signature: Redd Ruiz     Title: Licensed Independent Clinical                Interview Date: February 20, 2018

## 2018-02-21 NOTE — PROCEDURES
FINAL CARDIAC CATH REPORT    PROCEDURES PERFORMED:   1. Selective left and right coronary angiography.  2. Right heart catheterization.    PHYSICIANS:  1. Attending Interventional Cardiology Staff: Wai Garcia MD  2. Cardiology Fellow: Ez Gonzalez MD     INDICATION:  Kecia Blue is a 57 year old woman with a history of ILD presenting for lung transplant work up with a right heart catheterization and coronary angiography.    DESCRIPTION:  1. Sterile prep and procedure.  2. Location: right internal jugular vein and right radial artery  3. Access: Local anesthetic with lidocaine.  A standard (18 g) micropuncture (21 g) needle with ultrasound guidance was used to establish vascular access using a modified Seldinger technique.  4. Sheath: 7 Fr venous and 5 Fr arterial  5. Catheters: 7 Fr PA, 6 Fr Fantasma, and 4 Fr JL 3.5  6. Fluoroscopy time of 8.5 min.  7. Estimated blood loss of <5 mL.  8. See below for procedure details.    MEDICATIONS:  1. Contrast 70 mL IV.  2. Heparin, verapamil and nitroglycerin intra-arterial for radial artery spasm prophylaxis.    HEMODYNAMICS:  BSA 1.5  1. HR 91 bpm  2. /94/120  mmHg  3. RA 5   4. RV 95/5  5. PA 95/48/61   6. PCW 12   7. PA sat 51.6%   8. PCW sat unable to obtain  9. Hgb 13 g/dL   10. Valdemar CO 2.7   11. Valdemar CI 1.8   12. TD CO 3.6   13. TD CI 2.4   14. PVR 13.6     CORONARY ANGIOGRAM:   1. Both coronary arteries arise from their respective cusps.  2. Right dominant.  3. LM is without angiographic evidence of disease.   4. LAD is a type 3 vessel and gives rise to septal perforators, large bifurcating D1.  The pLAD has a 0% stenosis, mLAD has a 0% stenosis there is a myocardial bridge in the mid LAD, dLAD has a 0% stenosis, D1 has a 0% stenosis.  5. LCX gives rise to OM1 and OM2 vessels.  The pLCx has a 0% stenosis, mLCx has a 0% stenosis, dLCx has a 0% stenosis, OM1 has a 0% stenosis and OM2 has a 0% stenosis.    6. RCA gives rise to PL branches and supplies PDA.  The pRCA has a 0% stenosis, mRCA has a 0% stenosis, dRCA has a 0% stenosis, RPDA has a <25% ostial stenosis and RPLA has a 0% stenosis.      COMPLICATIONS:  1. None    SUMMARY:   1. Normal right and left sided filling pressures.  2. Severe secondary pulmonary hypertension.  3. Reduced cardiac index by Valdemar (1.8).  4. Minimal coronary artery disease.    PLAN:   1. Aspirin 81 mg po daily lifelong.  2. Bedrest per protocol.  3. Given patient's tenuous respiratory status and inability to maintain adequate oxygen saturation despite using two oxygen tanks, plan for admission for observation to medicine. Discussed with admitting medicine attending and Dr Hood.  4. Continued medical management and lifestyle modification for cardiovascular risk factor optimization.     The attending interventional cardiologist was present for the entire procedure.    Findings discussed with the primary attending Dr. Hood.    See CVIS Cath report for final draft.      Staff Cardiologist: I supervised the cardiology fellow and reviewed the hemodynamic and coronary findings with the fellow at the completion of the procedure.  I agree with the documentation above.    Wai Garcia MD

## 2018-02-21 NOTE — SIGNIFICANT EVENT
Transfer  Transferred from: Cath Lab at 1350  Via:bed  Reason for transfer:Pt appropriate for 6B- High 02 needs.  Family: Aware of transfer,  Ranjan at bedside.   Belongings: Received with pt  Chart: Received with pt  Medications: Meds received from old unit with pt  2 RN Skin Assessment Completed By: Not Done    Pt status: A/O X4. Afebrile, VSS. Bipap 70% Sat goal 90-95%. TR band deflated, removed at 1515. Bedrest until 1550. Bruising/hematoma to R wrist noted, unchanged. CMS intact to fingers, Radial pulse normal. Sips of water only. Needs ABG on Bipap done at 1530. Will continue to monitor.

## 2018-02-21 NOTE — PROGRESS NOTES
Arrived in wheelchair extremely SOB.  Patient upon arrival had nasal cannula in mouth at 10 liters/min as well as nasal canula in nose at 15 liters/min and was still very SOB as well as bluish in face and very blue in fingers and hands.  Patient placed on rebreather mask at 15 liters and sats eventually angelina to 80-90%, but took patient about 45 minutes to recover.  Notified MD's as well as RT to come and evaluate.  RT placed Oxi-plus mask to patient at 15 liters.  Patient denies pain.  PIV placed by vascular access.   at bedside and very concerned about his wife's oxygen demand, and their inadequate oxygen set up at home.  CCL aware of respiratory status.  RT saw patient at bedside and discussed with  some home oxygen options.  H&P needs update.  Consent obtained.  Ready for procedure.

## 2018-02-21 NOTE — PLAN OF CARE
Rec'd thorough report from Cath-Lab RN. Patient arrived to 6B, is alert and oriented and on bipap at 70%. Circulator RN settling, monitoring right radial site and deflating TR band per protocol.

## 2018-02-22 ENCOUNTER — APPOINTMENT (OUTPATIENT)
Dept: CT IMAGING | Facility: CLINIC | Age: 58
DRG: 003 | End: 2018-02-22
Attending: INTERNAL MEDICINE
Payer: COMMERCIAL

## 2018-02-22 LAB
ANION GAP SERPL CALCULATED.3IONS-SCNC: 8 MMOL/L (ref 3–14)
BUN SERPL-MCNC: 23 MG/DL (ref 7–30)
CALCIUM SERPL-MCNC: 8.9 MG/DL (ref 8.5–10.1)
CHLORIDE SERPL-SCNC: 105 MMOL/L (ref 94–109)
CO2 SERPL-SCNC: 26 MMOL/L (ref 20–32)
CREAT SERPL-MCNC: 1.05 MG/DL (ref 0.52–1.04)
ERYTHROCYTE [DISTWIDTH] IN BLOOD BY AUTOMATED COUNT: 15.4 % (ref 10–15)
FLUAV+FLUBV AG SPEC QL: ABNORMAL
FLUAV+FLUBV AG SPEC QL: ABNORMAL
FLUAV+FLUBV RNA SPEC QL NAA+PROBE: NEGATIVE
FLUAV+FLUBV RNA SPEC QL NAA+PROBE: NEGATIVE
GFR SERPL CREATININE-BSD FRML MDRD: 54 ML/MIN/1.7M2
GLUCOSE SERPL-MCNC: 110 MG/DL (ref 70–99)
HCT VFR BLD AUTO: 42.5 % (ref 35–47)
HGB BLD-MCNC: 14 G/DL (ref 11.7–15.7)
LACTATE BLD-SCNC: 2 MMOL/L (ref 0.7–2)
LACTATE BLD-SCNC: 3.1 MMOL/L (ref 0.4–1.9)
MAGNESIUM SERPL-MCNC: 1.6 MG/DL (ref 1.6–2.3)
MCH RBC QN AUTO: 34.2 PG (ref 26.5–33)
MCHC RBC AUTO-ENTMCNC: 32.9 G/DL (ref 31.5–36.5)
MCV RBC AUTO: 104 FL (ref 78–100)
PLATELET # BLD AUTO: 362 10E9/L (ref 150–450)
POTASSIUM SERPL-SCNC: 4.5 MMOL/L (ref 3.4–5.3)
PROTOCOL CUTOFF: NORMAL
RADIOLOGIST FLAGS: ABNORMAL
RBC # BLD AUTO: 4.09 10E12/L (ref 3.8–5.2)
RSV RNA SPEC NAA+PROBE: NEGATIVE
SA1 CELL: NORMAL
SA1 COMMENTS: NORMAL
SA1 HI RISK ABY: NORMAL
SA1 MOD RISK ABY: NORMAL
SA1 TEST METHOD: NORMAL
SA2 CELL: NORMAL
SA2 COMMENTS: NORMAL
SA2 HI RISK ABY UA: NORMAL
SA2 MOD RISK ABY: NORMAL
SA2 TEST METHOD: NORMAL
SODIUM SERPL-SCNC: 139 MMOL/L (ref 133–144)
SPECIMEN SOURCE: ABNORMAL
SPECIMEN SOURCE: NORMAL
TACROLIMUS BLD-MCNC: 10.5 UG/L (ref 5–15)
TME LAST DOSE: NORMAL H
UNACCEPTABLE ANTIGEN: NORMAL
UNOS CPRA: 0
WBC # BLD AUTO: 12.1 10E9/L (ref 4–11)

## 2018-02-22 PROCEDURE — 85027 COMPLETE CBC AUTOMATED: CPT | Performed by: STUDENT IN AN ORGANIZED HEALTH CARE EDUCATION/TRAINING PROGRAM

## 2018-02-22 PROCEDURE — 83605 ASSAY OF LACTIC ACID: CPT | Performed by: STUDENT IN AN ORGANIZED HEALTH CARE EDUCATION/TRAINING PROGRAM

## 2018-02-22 PROCEDURE — 36415 COLL VENOUS BLD VENIPUNCTURE: CPT | Performed by: STUDENT IN AN ORGANIZED HEALTH CARE EDUCATION/TRAINING PROGRAM

## 2018-02-22 PROCEDURE — 99233 SBSQ HOSP IP/OBS HIGH 50: CPT | Mod: GC | Performed by: PEDIATRICS

## 2018-02-22 PROCEDURE — 83735 ASSAY OF MAGNESIUM: CPT | Performed by: STUDENT IN AN ORGANIZED HEALTH CARE EDUCATION/TRAINING PROGRAM

## 2018-02-22 PROCEDURE — 25000125 ZZHC RX 250: Performed by: PHYSICIAN ASSISTANT

## 2018-02-22 PROCEDURE — 40000281 ZZH STATISTIC TRANSPORT TIME EA 15 MIN

## 2018-02-22 PROCEDURE — 25000125 ZZHC RX 250: Performed by: STUDENT IN AN ORGANIZED HEALTH CARE EDUCATION/TRAINING PROGRAM

## 2018-02-22 PROCEDURE — 12000006 ZZH R&B IMCU INTERMEDIATE UMMC

## 2018-02-22 PROCEDURE — 87631 RESP VIRUS 3-5 TARGETS: CPT | Performed by: STUDENT IN AN ORGANIZED HEALTH CARE EDUCATION/TRAINING PROGRAM

## 2018-02-22 PROCEDURE — 80048 BASIC METABOLIC PNL TOTAL CA: CPT | Performed by: STUDENT IN AN ORGANIZED HEALTH CARE EDUCATION/TRAINING PROGRAM

## 2018-02-22 PROCEDURE — 80197 ASSAY OF TACROLIMUS: CPT | Performed by: STUDENT IN AN ORGANIZED HEALTH CARE EDUCATION/TRAINING PROGRAM

## 2018-02-22 PROCEDURE — 86235 NUCLEAR ANTIGEN ANTIBODY: CPT | Performed by: STUDENT IN AN ORGANIZED HEALTH CARE EDUCATION/TRAINING PROGRAM

## 2018-02-22 PROCEDURE — 71250 CT THORAX DX C-: CPT

## 2018-02-22 PROCEDURE — 83516 IMMUNOASSAY NONANTIBODY: CPT | Performed by: STUDENT IN AN ORGANIZED HEALTH CARE EDUCATION/TRAINING PROGRAM

## 2018-02-22 PROCEDURE — 83605 ASSAY OF LACTIC ACID: CPT | Performed by: PEDIATRICS

## 2018-02-22 PROCEDURE — 36415 COLL VENOUS BLD VENIPUNCTURE: CPT | Performed by: PEDIATRICS

## 2018-02-22 PROCEDURE — 87804 INFLUENZA ASSAY W/OPTIC: CPT | Performed by: STUDENT IN AN ORGANIZED HEALTH CARE EDUCATION/TRAINING PROGRAM

## 2018-02-22 PROCEDURE — 25000128 H RX IP 250 OP 636: Performed by: STUDENT IN AN ORGANIZED HEALTH CARE EDUCATION/TRAINING PROGRAM

## 2018-02-22 PROCEDURE — 25000132 ZZH RX MED GY IP 250 OP 250 PS 637: Performed by: STUDENT IN AN ORGANIZED HEALTH CARE EDUCATION/TRAINING PROGRAM

## 2018-02-22 PROCEDURE — 25000128 H RX IP 250 OP 636: Performed by: PHYSICIAN ASSISTANT

## 2018-02-22 PROCEDURE — 25000131 ZZH RX MED GY IP 250 OP 636 PS 637: Performed by: STUDENT IN AN ORGANIZED HEALTH CARE EDUCATION/TRAINING PROGRAM

## 2018-02-22 RX ORDER — BENZONATATE 100 MG/1
100 CAPSULE ORAL 3 TIMES DAILY PRN
Status: DISCONTINUED | OUTPATIENT
Start: 2018-02-22 | End: 2018-02-27

## 2018-02-22 RX ADMIN — PREDNISONE 20 MG: 20 TABLET ORAL at 09:15

## 2018-02-22 RX ADMIN — TACROLIMUS 4 MG: 1 CAPSULE ORAL at 17:01

## 2018-02-22 RX ADMIN — PHENYLEPHRINE HYDROCHLORIDE 5 ML: 10 INJECTION, SOLUTION INTRAMUSCULAR; INTRAVENOUS; SUBCUTANEOUS at 11:12

## 2018-02-22 RX ADMIN — AZATHIOPRINE 50 MG: 50 TABLET ORAL at 09:15

## 2018-02-22 RX ADMIN — ENOXAPARIN SODIUM 40 MG: 40 INJECTION SUBCUTANEOUS at 17:01

## 2018-02-22 RX ADMIN — NYSTATIN 500000 UNITS: 100000 SUSPENSION ORAL at 17:01

## 2018-02-22 RX ADMIN — Medication 75 MG: at 20:12

## 2018-02-22 RX ADMIN — TACROLIMUS 4 MG: 1 CAPSULE ORAL at 09:15

## 2018-02-22 RX ADMIN — NYSTATIN 500000 UNITS: 100000 SUSPENSION ORAL at 09:15

## 2018-02-22 RX ADMIN — SODIUM CHLORIDE, POTASSIUM CHLORIDE, SODIUM LACTATE AND CALCIUM CHLORIDE 500 ML: 600; 310; 30; 20 INJECTION, SOLUTION INTRAVENOUS at 21:30

## 2018-02-22 RX ADMIN — ASPIRIN 81 MG: 81 TABLET, COATED ORAL at 09:15

## 2018-02-22 ASSESSMENT — ACTIVITIES OF DAILY LIVING (ADL)
ADLS_ACUITY_SCORE: 9

## 2018-02-22 NOTE — CONSULTS
[facility]  Pulmonary Consult     Patient:  Kecia Blue, Date of birth 1960, Medical record number [id number]  Date of Visit:  02/21/2018    Reason for Consult: Hypoxia         Assessment and Recommendations:   Ms. Blue is a 57-year-old female with history of dermatomyositis with associated ILD, pulmonary hypertension, and seronegative RA who was admitted from Cath Lab for increasing shortness of breath and hypoxia.    Patient's ongoing hypoxia likely secondary to progression of her ILD. PFTs in 1/2018 show FEV1 of 0.92L (35%) DLCO of 27% predicted. 6 minute walk test completed that time showed desaturations with walking with 10L of oxygen. Patient had normal right and left filling pressures on RHC, ruling out volume overload. CXR on admission without obvious infiltrate to suggest pneumonia.    --Continue prednisone 20mg daily (increased from PTA 10mg after discussion with Dr. Hood)  --Multidisciplinary lung transplant meeting held this AM: Please obtain ENT consult, CT chest without contrast for surgical planning, and rheumatology consult. If no concerns from above, hope to list patient for transplant as early as tomorrow.  --Continue PTA tacrolimus, azathioprine, and Bactrim.    Thank you very much for this consultation. Pulmonary will continue to follow.    Patient discussed with Dr. Bruno and Dr. Sana Baugh  Pulmonary and Critical Care Fellow  2317        History of Present Illness     Ms. Blue is a 57-year-old female with history of dermatomyositis with associated ILD, pulmonary hypertension, and seronegative RA who was admitted from Cath Lab for increasing shortness of breath and hypoxia.    Patient was initially diagnosed with dermatomyositis in 2014 ILD 2015.  Over the last 2 months, breathing has worsened, and is not short of breath with any activity.  Previously requiring 3-5 L of oxygen at baseline, but over the past 2 months has been using  up to 10-15 L.  Denies any worsening cough, sputum production, fevers, or chills.  No sick contacts.  Has been losing weight due to decreased PO intake secondary to shortness of breath.    Patient currently follows with Dr. Hood in pulmonary clinic, last seen on 1/5/2018. Workup for lung transplantation was discussed at that time. Patient had PET scan done that showed uptake and tongue base, soft palate, and thyroid cartilage, possibly physiologic. Patient presented today for scheduled RHC and LHC and was hypoxic despite 15L of oxygen. Found to have severe pulmonary HTN (mean PA pressure 61) and minimal coronary artery disease. Patient was admitted afterwards due to ongoing hypoxia.    Review of Systems:  10 point ROS completed and negative except for above.    Past Medical History:   Diagnosis Date     Antisynthetase syndrome (H) 2014     Chronic cough      Dermatomyositis (H)      Dysplasia of cervix, low grade (ESTRADA 1)      ILD (interstitial lung disease) (H)      Osteopenia      Pulmonary hypertension      Raynaud's disease      Seronegative rheumatoid arthritis (H)        Past Surgical History:   Procedure Laterality Date     ENT SURGERY      tonsillectomy as a child     no prior surgery         Family History   Problem Relation Age of Onset     Hypertension Mother      Arthritis Mother      Pancreatic Cancer Father      DIABETES Father        Social History     Social History Narrative     Social History   Substance Use Topics     Smoking status: Never Smoker     Smokeless tobacco: Never Used     Alcohol use 0.6 oz/week     1 Glasses of wine per week      Comment: 1 glass 3 times weekly            Current Medications & Allergies:       sodium chloride (PF)  3 mL Intracatheter Q8H     azaTHIOprine (IMURAN) tablet 50 mg  50 mg Oral BID     nystatin  500,000 Units Oral 4x Daily     sulfamethoxazole-trimethoprim  1 tablet Oral Q Mon Wed Fri AM     tacrolimus  4 mg Oral BID IS     sodium chloride (PF)  3 mL  Intracatheter Q8H     enoxaparin  40 mg Subcutaneous Q24H     predniSONE (DELTASONE) tablet 20 mg  20 mg Oral Daily     [START ON 2/22/2018] aspirin EC  81 mg Oral Daily       Infusions/Drips:    sodium chloride Stopped (02/21/18 1920)     - MEDICATION INSTRUCTIONS -       - MEDICATION INSTRUCTIONS -         No Known Allergies         Physical Exam:   Ranges for vital signs:  Temp:  [97  F (36.1  C)-98.3  F (36.8  C)] 97.9  F (36.6  C)  Pulse:  [88-93] 88  Heart Rate:  [84-99] 99  Resp:  [28-40] 36  BP: (122-147)/() 144/103  FiO2 (%):  [70 %-100 %] 100 %  SpO2:  [90 %-97 %] 92 %  Vitals:    02/21/18 0945   Weight: 51.3 kg (113 lb)       Physical Examination:  GENERAL:  Lying in bed, no acute distress  HEAD:  Head is normocephalic, atraumatic   EYES:  Eyes have anicteric sclerae   LUNGS:  Bilateral crackles, breathing comfortably.  CARDIOVASCULAR:  Regular rate and rhythm   ABDOMEN:  Soft, nontender, nondistended  SKIN:  No acute rashes. Clubbing and duskiness noted on fingers.  NEUROLOGIC:  Awake and alert, answering questions appropriately.  PSYCH: Normal affect.         Laboratory Data:     Immune Globulin Studies     Recent Labs   Lab Test  02/19/18   0759   IGG  1790*   IGM  502*   IGA  425*   IGG1  1300*   IGG2  131*   IGG3  101   IGG4  1*       Metabolic Studies       Recent Labs   Lab Test  02/21/18   1615  02/21/18   1439  02/19/18   0759   NA   --   142  141   POTASSIUM   --   4.3  4.0   CHLORIDE   --   107  106   CO2   --   28  28   ANIONGAP   --   7  7   BUN   --   16  20   CR   --   0.93  1.02   GFRESTIMATED   --   63  56*   GLC   --   96  90   CHELSEY   --   8.6  8.9   PHOS   --    --   3.2   MAG   --    --   1.5*   LACT  0.7   --    --        Hepatic Studies    Recent Labs   Lab Test  02/21/18   1439  02/19/18   0759 11/07/17   BILITOTAL  0.7  0.6   --    ALKPHOS  58  60   --    PROTTOTAL  7.8  8.1   --    ALBUMIN  3.2*  3.4   --    AST  26  27  28   ALT  20  24  19       Hematology Studies       Recent Labs   Lab Test  02/21/18   1439  02/19/18   0759   WBC  14.0*  11.9*   ANEU  11.3*  9.7*   ALYM  1.3  1.0   SHERRI  1.1  0.9   AEOS  0.3  0.3   HGB  14.1  13.9   HCT  43.5  42.8   PLT  348  349       Clotting Studies    Recent Labs   Lab Test  02/19/18   0759   INR  1.01   PTT  27       Arterial Blood Gas Testing    Recent Labs   Lab Test  02/21/18   1615  02/19/18   0759   PH  7.46*   --    PCO2  36   --    PO2  93   --    HCO3  26   --    O2PER  70  10L        Imaging:  Recent Results (from the past 48 hour(s))   XR Chest Port 1 View    Narrative    Exam:  Chest X-ray 2/21/2018 2:12 PM    History: Acute on chronic hypoxic respiratory failure in pt w/ hx of  ILD;     Comparison: Chest x-ray 2/19/2018    Findings: Portable AP radiographs of the chest. The cardiac silhouette  is normal in size. The pulmonary vasculature is indistinct. Bilateral,  basilar predominant reticulonodular opacities which appear unchanged  from prior exam. No pleural effusion or pneumothorax. Air distention  of loops of bowel in the visualized upper abdomen.      Impression    Impression:  1. Bilateral, basilar predominant reticulonodular opacities,  consistent with known history of interstitial lung disease. Findings  appear similar to the prior exam on 2/19/2018.  2. No definite superimposed acute airspace disease.    I have personally reviewed the examination and initial interpretation  and I agree with the findings.    ANNE MANZANO MD

## 2018-02-22 NOTE — CONSULTS
"[facility] Rheumatology Consultation    Kecia Blue MRN# [id number]   Age: 57 year old YOB: 1960     Date of Admission:  2/21/2018    Reason for consult: Dermatomyositis with ILD       Requesting physician: Dr. Kiersten Eli       Level of consult: Consult, follow and place orders           Impression and Recommendation:     Discussion:  Kecia Blue is a patient with dermatomyositis and ILD admitted for acute hypoxic respiratory failure with worsening ILD in setting of lung transplant workup. From a dermatomyositis standpoint, she appears to be very well controlled on AZA 150mg daily, Tacrolimus, and Prednisone 5mg daily. She has no Gottron's papules, heliotrope rash or upper/lower extremity weakness. 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 She reports having these for years without progression and SCL-70 was done in 2014 and negative. Despite, this would still check common scleroderma labs (Scl70, anticentromere, and RNApolIII) to rule this out. As far as ILD is concerned, will defer to pulmonology for steroid dosing. From a rheumatology standpoint, Kecai is cleared for lung transplant.     Recommendations:  - Already ordered Scl70, anticentromere, RNApol3 (scleroderma serologies), positive results will not affect the outcome of lung transplant as pts with scleroderma do well with lung transplant; therefore this was ordered as part of diagnostic work up   - Agree with prednisone dosing per pulmonology  - Clear for transplant per Rheumatology standpoint given stable dermatomyositis      Thank you for this interesting consult.     The patient was seen and discussed with attending Dr. Geri Duggan MD  Internal Medicine, PGY1  Pager: [phone]    Attending " Note: I saw and evaluated the patient with Dr. Duggan. I agree with the assessment and plan.    Sam Nevarez MD           Chief Complaint:     Acute Hypoxic Respiratory Failure         History of Present Illness:   This patient is a 57 year old  female with a significant past medical history of Dermatomyositis with ILD (+BENJAMÍN, +Anti-Felisa, +SSA), pulmonary HTN, seronegative RA, antisynthetase syndrome, and osteopenia admitted for acute hypoxic respiratory failure and undergoing lung transplant workup.    She follows with primary rheumatologist in [place], Dr. Herb Lr. She was diagnosed with dermatomyositis characterized by easy fatigability, muscle weakness of the proximal musculature of the upper extremities, shortness of breath with evidence of ground-glass opacities in the lung fields, typical rash of Gottron papules and 's hand in the setting of a strongly positive BENJAMÍN, elevated Felisa-1 antibody, positive SSA and elevated CPK, aldolase, AST and ALT. An MRI of the upper extremities did not show significant myositis in a muscle that could be biopsied and, therefore, she was treated empirically.  Patient was placed on CellCept initially however as corticosteroids are being tapered down she continued to have shortness of breath and increased inflammatory markers, this treatment was considered failure.  She then was on azathioprine, but as her TPMT was slightly low she was kept at a dose of 100 mg daily.  Again as corticosteroids were being tapered down she continued to have active disease with Gottron papules.  Her azathioprine dose was then increased to 150 mg daily.  She had been doing quite well up until 12/2015 when she noticed worsening cough, again in the setting of steroids being tapered, she also had recurrence of rash on her face.  Her primary rheumatologist and referred her to [facility] pulmonology and rheumatology where it was decided that she had failed CellCept and azathioprine and  rituximab was recommended.  She was given rituximab dose on 4/2016 in combination with azathioprine, Bactrim, and prednisone.  Her skin improved greatly, though shortness of breath was still an issue.  [facility] pulmonology recommended the addition of tacrolimus to azathioprine and prednisone.  Despite this, she has been diagnosed with pulmonary hypertension and in 9/2017 she had CT scan of her chest which showed an SIP and fibrotic changes.  Rheumatology at [facility] had been considering cyclophosphamide for this patient, however they were opting to wait until January 2018.  Though while waiting patient's lung disease has considerably worsened and she is now being considered for lung transplant and has begun workup.    While undergoing right heart cath on 2/21/2018, she developed shortness of breath and hypoxia.  Reportedly, she had been having worsening shortness of breath for the past 1-2 months at baseline oxygen demands had greatly increased.  She was admitted for further workup and possible imminent lung transplant.    Upon meeting patient, she states her dermatomyositis is very well treated on azathioprine, prednisone, tacrolimus.  In fact, she states she almost forget she has this diagnosis as her weakness and rash has been gone for quite some time now.  She states she has really just been dealing with her lung disease.  She notes no new rashes, no new arthralgias, no fevers/chills.  She does note difficulty making a fist with hands bilaterally, and has skin tightening around her fingers which are also cyanotic at this time.  She has had no vision disturbances, or eye pain, and no chest pain, abdominal pain, urinary symptoms, constipation/diarrhea.    History is obtained from the patient and EMR.       Past Medical History:     Past Medical History:   Diagnosis Date     Antisynthetase syndrome (H) 2014     Chronic cough      Dermatomyositis (H)      Dysplasia of cervix, low grade (ESTRADA 1)      ILD  (interstitial lung disease) (H)      Osteopenia      Pulmonary hypertension      Raynaud's disease      Seronegative rheumatoid arthritis (H)              Past Surgical History:     Past Surgical History:   Procedure Laterality Date     ENT SURGERY      tonsillectomy as a child     no prior surgery               Social History:     Social History   Substance Use Topics     Smoking status: Never Smoker     Smokeless tobacco: Never Used     Alcohol use 0.6 oz/week     1 Glasses of wine per week      Comment: 1 glass 3 times weekly             Family History:     Family History   Problem Relation Age of Onset     Hypertension Mother      Arthritis Mother      Pancreatic Cancer Father      DIABETES Father      Family history reviewed and updated in [facility]          Immunizations:     There is no immunization history on file for this patient.          Allergies:   No Known Allergies          Medications:     Prescriptions Prior to Admission   Medication Sig Dispense Refill Last Dose     order for DME Equipment being ordered: Oxygen 5 liters at rest, 15 liters with exertion.  Needs portability.  Please provide 2 10-liter concentrators for in the home 1 Device prn 2/21/2018 at Unknown time     AZATHIOPRINE PO Take 50 mg by mouth 2 times daily 50 mg tablet, 1.5 tablets by mouth 2 times a day   2/20/2018 at 2200     calcium carbonate (OS-CHELSEY 500 MG [place]. [place]) 1250 MG tablet Take 1 tablet by mouth 2 times daily   2/20/2018 at 2200     multivitamin, therapeutic with minerals (THERA-VIT-M) TABS tablet Take 1 tablet by mouth daily   2/20/2018 at 0800     PREDNISONE PO Take 5 mg by mouth daily   2/20/2018 at 0800     sulfamethoxazole-trimethoprim (BACTRIM DS/SEPTRA DS) 800-160 MG per tablet Take 1 tablet by mouth Every Mon, Wed, Fri Morning   Past Week at Unknown time     TACROLIMUS PO Take 0.1 mg by mouth 2 times daily 0.1 mg capsule, take 4 capsules by mouth 2 times a day.   2/20/2018 at 2200     nystatin (MYCOSTATIN) 960756  UNIT/ML suspension Take 100,000 Units by mouth 4 times daily 100,000 unit/ ml, take 4- 6 ml by mouth 4 times a day.   More than a month at Unknown time             Review of Systems:   Constitutional: denies fevers/chills, fatigue, pos for weight loss  Skin: denies rash, raynauds or alopecia  Eyes: denies hx of uvetitis, double vision, dry eyes  Ears/Nose/Throat:  denies recurrent URI or sinusitis, denies dry mouth, oral or nasal ulcers  Respiratory: Pos for SOB and DESHPANDE, no cough, or hemoptysis  Cardiovascular:  denies chest pain, palpitations, hx pleurisy  Gastrointestinal: denies diarrhea, blood in stool, hx of IBD  Genitourinary: denies hematuria, genital lesions  Musculoskeletal: denies joint pain, back pain, red, tender, swollen joints  Neurologic:  denies headaches, seizures, numbness or tingling  Psychiatric:  denies history of depression or mental illness  Hematologic/Lymphatic/Immunologic:  denies history of blood clots, easy bruising, bleeding, or miscarriages  Endocrine:  denies known thyroid disorders          Physical Exam:   Temp: 97.9  F (36.6  C) Temp src: Oral BP: 120/82   Heart Rate: 94 Resp: 26 SpO2: (!) 86 % O2 Device: High Flow Nasal Cannula (HFNC) Oxygen Delivery: 15 LPM    General: malnourished, well-developed, appearing stated age; cooperative  Eyes: nml EOM, PERRLA, conjunctiva, sclera  ENT: nml external ears, nose, lips, teeth, gums, throat. HFNC present at nares  Neck: no mass or thyroid enlargement  Resp: bilateral crackles, breathing comfortably  CV: RRR, no murmurs, rubs or gallops, no edema  GI: soft, nontender, nondistended, no HSM  Lymph: no cervical, supraclavicular, epitrochlear nodes  MS:   Neck - normal range of motion  Shoulder -  Right and left shoulder with normal ROM in full abduction/adduction and internal/external rotation without pain. No glenohumeral effusion/warmth, no tenderness bilaterally. SC joints without effusion or tenderness on palpation.  Elbow - full ROM and no  joint effusion on exam; nontender bilaterally   Wrist - full ROM and no joint effusion on exam; nontender bilaterally  Hand - Sclerodactyly present at digits of bilateral hands upto PIP level, cyanosis and clubbing present. Inability to make tight fist. T0S0 of bilateral DIPs, PIPs, MCP joints.   Knee - no joint effusion or joint line tenderness on exam; normal range of motion.  Ankle -  Left and right ankle joint lines without swelling or tenderness. Non-tender to palpation  Foot - Cyanosis of digits at bilateral toes, no focal pain or synovitis on palpation of the MTPs bilaterally  Skin: no alopecia, rash, nodules or lesions  Neuro: nl cranial nerves, strength, sensation, DTRs.   Psych: nl judgement, orientation, memory, affect.            Data:   BMP/CMP:  Recent Labs  Lab 02/22/18  0503 02/21/18  1439 02/19/18  0759    142 141   POTASSIUM 4.5 4.3 4.0   CHLORIDE 105 107 106   CO2 26 28 28   ANIONGAP 8 7 7   * 96 90   BUN 23 16 20   CR 1.05* 0.93 1.02   GFRESTIMATED 54* 63 56*   GFRESTBLACK 66 76 68   CHELSEY 8.9 8.6 8.9   MAG 1.6  --  1.5*   PHOS  --   --  3.2   PROTTOTAL  --  7.8 8.1   ALBUMIN  --  3.2* 3.4   BILITOTAL  --  0.7 0.6   ALKPHOS  --  58 60   AST  --  26 27   ALT  --  20 24     CBC:  Recent Labs  Lab 02/22/18  0503 02/21/18  1439 02/19/18  0759   WBC 12.1* 14.0* 11.9*   RBC 4.09 4.16 4.11   HGB 14.0 14.1 13.9   HCT 42.5 43.5 42.8   * 105* 104*   MCH 34.2* 33.9* 33.8*   MCHC 32.9 32.4 32.5   RDW 15.4* 15.6* 14.8    348 349     UA:  Recent Labs   Lab Test  02/21/18   1500   COLOR  Light Yellow   APPEARANCE  Clear   URINEGLC  Negative   URINEBILI  Negative   URINEKETONE  Negative   SG  1.021   UBLD  Negative   URINEPH  7.5*   PROTEIN  10*   NITRITE  Negative   LEUKEST  Negative   RBCU  1   WBCU  <1     2014  Felisa-1: 6.1  CPK: 411  Aldolase: 18.6  Anti SSA: 1.0  RF/CCP: neg  Anti SSB: neg  AntiSm: neg  U1RNP: neg  Scl70: neg  PR3: neg  MPO: neg    Imaging:  Results for orders placed  or performed during the hospital encounter of 02/21/18   XR Chest Port 1 View    Narrative    Exam:  Chest X-ray 2/21/2018 2:12 PM    History: Acute on chronic hypoxic respiratory failure in pt w/ hx of  ILD;     Comparison: Chest x-ray 2/19/2018    Findings: Portable AP radiographs of the chest. The cardiac silhouette  is normal in size. The pulmonary vasculature is indistinct. Bilateral,  basilar predominant reticulonodular opacities which appear unchanged  from prior exam. No pleural effusion or pneumothorax. Air distention  of loops of bowel in the visualized upper abdomen.      Impression    Impression:  1. Bilateral, basilar predominant reticulonodular opacities,  consistent with known history of interstitial lung disease. Findings  appear similar to the prior exam on 2/19/2018.  2. No definite superimposed acute airspace disease.    I have personally reviewed the examination and initial interpretation  and I agree with the findings.    ANNE MANZANO MD   CT Chest w/o Contrast     Value    Radiologist flags Pneumomediastinum (Urgent)    Impression    IMPRESSION:   1. Unchanged findings consistent with known interstitial lung disease.  Findings are inconsistent with UIP pattern. Differential includes  NSIP.  2. Pneumomediastinum extending into the subcutaneous soft tissue of  the left neck. These findings may be seen with interstitial lung  disease, although differential includes infection and barotrauma.  Correlate with any recent procedures.        [Access Center: Pneumomediastinum]    This report will be copied to the [facility] to ensure a  provider acknowledges the finding. Access Center is available Monday  through Friday 8am-3:30 pm.

## 2018-02-22 NOTE — PLAN OF CARE
"Problem: Patient Care Overview  Goal: Plan of Care/Patient Progress Review  BP (!) 120/93 (BP Location: Left arm)  Pulse 88  Temp 98  F (36.7  C) (Oral)  Resp 26  Ht 1.6 m (5' 3\")  Wt 50.3 kg (111 lb)  SpO2 93%  BMI 19.66 kg/m2     VSS. % HFNC or 65% Bipap. A&O x4. Denies pain. S/p heart cath. R radial puncture site with small hematoma/bruising. CMS/pulses intact. Tolerating regular diet. Up to commode with SBA. PIV SL. Lung sounds with fine crackles. Continue to monitor.   Cont with Lung tx workup.          "

## 2018-02-22 NOTE — PROVIDER NOTIFICATION
02/22/18 0800   Call Information   Date of Call 02/22/18   Time of Call 0824   Name of person requesting the team Regina   Title of person requesting team RN   RRT Arrival time 0826   Time RRT ended 0848   Reason for call   Type of RRT Adult   Primary reason for call Respiratory   Respiratory O2sat less than 88% for greater than 5 minutes despite O2;Other (describe)  (Coughing attacks )   Was patient transferred from the ED, ICU, or PACU within last 24 hours prior to RRT call? Yes   SBAR   Situation Oxygen sats decreased with increasing oxygen needs   Background history of dermatomyositis, seronegative RA, ILD, and pulmonary hypertension who is undergoing lung transplant workup and is admitted from cath lab for increasing shortness of breath and hypoxia. She was initially diagnosed with dermatomyositis in 2014 and ILD in 2015.    Notable History/Conditions Hypertension  (ILD, lung transplant workup)   Assessment Bipap on with coughing attacks, sats mid-high 80%, coughing up clear to light yelllow sputum, happens every morning per pt, wears nasal cannula in nose & mouth at home.    Interventions Other (describe)  (Changed to hi-flow nc)   Patient Outcome   Patient Outcome Stabilized on unit   RRT Team   Lead LOUISE Rey   Supposed to go to endo today for more work-up for transplant.  Sats 97-98% on 100% hi-flow. Pt feeling better and more comfortable.  Does this everyone morning at home when she wakes up.

## 2018-02-22 NOTE — PROGRESS NOTES
02/22/18 0800   Post RRT Intervention Assessment   Post RRT Assessment Stable/Improved   Date Follow Up Done 02/22/18   Time Follow Up Done 1107

## 2018-02-22 NOTE — CONSULTS
Cardiothoracic surgery lung transplant consultation      Kecia Blue MRN# [id number]   YOB: 1960 Age: 57 year old   Date of Service: February 22, 2018         Reason for consult: Kecia Blue is a 57 year old  female with life threatening end stage lung disease due to interstitial lung disease from dermatomyositis with pulmonary hypertension who is undergoing evaluation for lung transplant.           Assessment and Plan:   I recommend listing for lung transplantation. I discussed the technical details of the procedure with the patient including the need for bilateral (double) lung transplant, as well as the possibility of a sternotomy incision or bilateral anterolateral thoracotomy with transverse sternotomy (clamshell). I also discussed the possibility of pre- or post-operative extracorporeal membrane oxygenation. I explained the expected postoperative course and recovery, including the likelihood of a prolonged hospitalization. The patient understands the risks and benefits of the procedure. The risks include but are not limited to bleeding, infection including opportunistic infection, stroke, primary graft dysfunction, respiratory failure possibly requiring prolonged ventilation and tracheostomy, lifelong need for immunosuppression, acute or chronic rejection, renal failure, hepatic insufficiency, visceral or limb ischemia, and death. The patient understands these risks and wishes to undergo the operation. After discussion in multidisciplinary lung transplant conference, listing may be finalized.    I would just observe the pneumomediastinum. If she develops worsening hypoxia, chest pain, or subcutaneous emphysema, we will need to re-evaluate this.             Chief Complaint:   Kecia Blue is a 57 year old female with severe, progressively worsening respiratory failure.             History of Present Illness:   This patient is a 57 year old female who presents with severe,  progressively worsening respiratory failure. She has a history of dermatomyositis causing interstitial lung disease. She is immunosuppressed with prednisone, azathioprine and .    She was on increasing supplemental oxygen at home and was undergoing evaluation for lung transplant listing when she had an acute worsening of her respiratory status after cardiac catheterization. She is now requiring high flow nasal cannula or BiPAP.     There was as FDG avid area on the diaphragm on previous PET-CT, but this resolved on a follow up exam. On the follow up PET-CT there was an FDG-avid area near the tongue. She was evaluated by ENT and had a normal head/neck exam with no contraindication to lung transplant.     BMI is 19. FVC 35%, DLCO 27%.   Perfusion 57% RL, 43% LL.       On CT chest today there is new pneumomediastinum.              Past Medical History:     Past Medical History:   Diagnosis Date     Antisynthetase syndrome (H) 2014     Chronic cough      Dermatomyositis (H)      Dysplasia of cervix, low grade (ESTRADA 1)      ILD (interstitial lung disease) (H)      Osteopenia      Pulmonary hypertension      Raynaud's disease      Seronegative rheumatoid arthritis (H)              Past Surgical History:     Past Surgical History:   Procedure Laterality Date     ENT SURGERY      tonsillectomy as a child     no prior surgery                 Social History:     Social History   Substance Use Topics     Smoking status: Never Smoker     Smokeless tobacco: Never Used     Alcohol use 0.6 oz/week     1 Glasses of wine per week      Comment: 1 glass 3 times weekly             Family History:     Family History   Problem Relation Age of Onset     Hypertension Mother      Arthritis Mother      Pancreatic Cancer Father      DIABETES Father              Immunizations:     There is no immunization history on file for this patient.          Allergies:   No Known Allergies          Medications:     Current Facility-Administered  Medications Ordered in Epic   Medication Dose Route Frequency Last Rate Last Dose     benzonatate (TESSALON) capsule 100 mg  100 mg Oral TID PRN         lidocaine 1 % 1 mL  1 mL Other Q1H PRN         lidocaine (LMX4) kit   Topical Q1H PRN         sodium chloride (PF) 0.9% PF flush 3 mL  3 mL Intracatheter Q1H PRN         sodium chloride (PF) 0.9% PF flush 3 mL  3 mL Intracatheter Q8H   3 mL at 02/21/18 2233     HOLD:  Metformin and metformin containing medications if patient received IV contrast   Does not apply HOLD         naloxone (NARCAN) injection 0.1-0.4 mg  0.1-0.4 mg Intravenous Q2 Min PRN         azaTHIOprine (IMURAN) tablet 50 mg  50 mg Oral BID   50 mg at 02/22/18 0915     nystatin (MYCOSTATIN) suspension 500,000 Units  500,000 Units Oral 4x Daily   500,000 Units at 02/22/18 1701     sulfamethoxazole-trimethoprim (BACTRIM DS/SEPTRA DS) 800-160 MG per tablet 1 tablet  1 tablet Oral Q Mon Wed Fri AM   1 tablet at 02/21/18 1849     tacrolimus (GENERIC EQUIVALENT) capsule 4 mg  4 mg Oral BID IS   4 mg at 02/22/18 1701     melatonin tablet 1 mg  1 mg Oral At Bedtime PRN         sodium chloride (PF) 0.9% PF flush 3 mL  3 mL Intracatheter Q1H PRN         sodium chloride (PF) 0.9% PF flush 3 mL  3 mL Intracatheter Q8H   3 mL at 02/22/18 1701     enoxaparin (LOVENOX) injection 40 mg  40 mg Subcutaneous Q24H   40 mg at 02/22/18 1701     magnesium sulfate 4 g in 100 mL sterile water (premade)  4 g Intravenous Q4H PRN         potassium chloride SA (K-DUR/KLOR-CON M) CR tablet 20-40 mEq  20-40 mEq Oral Q2H PRN         potassium chloride (KLOR-CON) Packet 20-40 mEq  20-40 mEq Oral or Feeding Tube Q2H PRN         potassium chloride 10 mEq in 100 mL sterile water intermittent infusion (premix)  10 mEq Intravenous Q1H PRN         potassium chloride 10 mEq in 100 mL intermittent infusion with 10 mg lidocaine  10 mEq Intravenous Q1H PRN         potassium chloride 20 mEq in 50 mL intermittent infusion  20 mEq Intravenous Q2H  PRN         acetaminophen (TYLENOL) tablet 650 mg  650 mg Oral Q4H PRN         senna-docusate (SENOKOT-S;PERICOLACE) 8.6-50 MG per tablet 1 tablet  1 tablet Oral BID PRN        Or     senna-docusate (SENOKOT-S;PERICOLACE) 8.6-50 MG per tablet 2 tablet  2 tablet Oral BID PRN         polyethylene glycol (MIRALAX/GLYCOLAX) Packet 17 g  17 g Oral Daily PRN         ondansetron (ZOFRAN-ODT) ODT tab 4 mg  4 mg Oral Q6H PRN        Or     ondansetron (ZOFRAN) injection 4 mg  4 mg Intravenous Q6H PRN         prochlorperazine (COMPAZINE) injection 10 mg  10 mg Intravenous Q6H PRN        Or     prochlorperazine (COMPAZINE) tablet 10 mg  10 mg Oral Q6H PRN        Or     prochlorperazine (COMPAZINE) Suppository 25 mg  25 mg Rectal Q12H PRN         No lozenges or gum should be given while patient on BIPAP/AVAPS/AVAPS AE   Does not apply Continuous PRN         hypromellose-dextran (ARTIFICAL TEARS) ophthalmic solution 1 drop  1 drop Both Eyes Q1H PRN         Patient may continue current oral medications   Does not apply Continuous PRN         predniSONE (DELTASONE) tablet 20 mg  20 mg Oral Daily   20 mg at 02/22/18 0915     aspirin EC EC tablet 81 mg  81 mg Oral Daily   81 mg at 02/22/18 0915     No current [facility]-ordered outpatient prescriptions on file.             Review of Systems:   The 10 point Review of Systems is negative other than noted in the HPI            Physical Exam:     Vitals were reviewed  Patient Vitals for the past 24 hrs:   BP Temp Temp src Heart Rate Resp SpO2 Weight   02/22/18 1527 (!) 138/96 98.3  F (36.8  C) Oral 96 26 90 % -   02/22/18 1400 - - - - - 95 % -   02/22/18 1134 120/82 97.9  F (36.6  C) Oral 94 26 (!) 86 % -   02/22/18 1015 - - - - - 97 % -   02/22/18 1000 - - - - - (!) 85 % -   02/22/18 0847 104/81 98.3  F (36.8  C) Oral 99 28 96 % -   02/22/18 0537 - - - - - - 50.3 kg (111 lb)   02/22/18 0400 (!) 120/93 98  F (36.7  C) Oral 87 26 - -   02/22/18 0100 - - - - - 93 % -   02/22/18 0000 124/88 98.3   F (36.8  C) Oral 94 26 96 % -   02/21/18 1929 (!) 144/103 97.9  F (36.6  C) Oral 99 (!) 36 92 % -     Constitutional:   awake, alert, cooperative, no apparent distress, and appears stated age     Eyes:   Lids and lashes normal, pupils equal, round and reactive to light, extra ocular muscles intact, sclera clear, conjunctiva normal     ENT:   normocepalic, without obvious abnormality     Neck:   supple, symmetrical, trachea midline     Lungs:   Moderate increased work of breathing and use of accessory muscles, dyspnea with speech     Cardiovascular:   regular rate and rhythm     Chest / Breast:   No incisions         Abdomen:   Soft, nondistended     Musculoskeletal:   no lower extremity pitting edema present  there is no redness, warmth, or swelling of the joints  full range of motion noted     Neurologic:   Awake, alert, symmetric strength     Neuropsychiatric:   General: normal, calm and normal eye contact  Level of consciousness: alert / normal  Affect: normal     Skin:   no bruising or bleeding, normal skin color, texture, turgor and no redness, warmth, or swelling          Data:   All laboratory data reviewed    Transthoracic echocardiogram:  Left ventricular size is normal. Hyperdynamic LV function EF 65%-70%  Mild right ventricular dilation is present. Global right ventricular function  appears mildly reduced (unable to visualize RV well).  Severe pulmonary hypertension. Right ventricular systolic pressure is 80mmHg  above the right atrial pressure (RAP). Unable to assess RAP  Flattened septum is consistent with right ventricular pressure and volume  overload.  Mild to moderate tricuspid insufficiency is present.  Small PFO demonstrated by bubble study at rest and with valsalva maneuver  Trivial pericardial effusion is present.  Previous study not available for comparison.         Cardiac catheterization:     HEMODYNAMICS:  BSA 1.5  1. HR 91 bpm  2. /94/120  mmHg  3. RA 5   4. RV 95/5  5. PA 95/48/61    6. PCW 12   7. PA sat 51.6%   8. PCW sat unable to obtain  9. Hgb 13 g/dL   10. Valdemar CO 2.7   11. Valdemar CI 1.8   12. TD CO 3.6   13. TD CI 2.4   14. PVR 13.6      CORONARY ANGIOGRAM:   1. Both coronary arteries arise from their respective cusps.  2. Right dominant.  3. LM is without angiographic evidence of disease.   4. LAD is a type 3 vessel and gives rise to septal perforators, large bifurcating D1.  The pLAD has a 0% stenosis, mLAD has a 0% stenosis there is a myocardial bridge in the mid LAD, dLAD has a 0% stenosis, D1 has a 0% stenosis.  5. LCX gives rise to OM1 and OM2 vessels.  The pLCx has a 0% stenosis, mLCx has a 0% stenosis, dLCx has a 0% stenosis, OM1 has a 0% stenosis and OM2 has a 0% stenosis.    6. RCA gives rise to PL branches and supplies PDA. The pRCA has a 0% stenosis, mRCA has a 0% stenosis, dRCA has a 0% stenosis, RPDA has a <25% ostial stenosis and RPLA has a 0% stenosis.        CT Chest:  1. Unchanged findings consistent with known interstitial lung disease.  Findings are inconsistent with UIP pattern. Differential includes  NSIP.  2. Pneumomediastinum extending into the subcutaneous soft tissue of  the left neck. These findings may be seen with interstitial lung  disease, although differential includes infection and barotrauma.  Correlate with any recent procedures.      Nuclear medicine lung perfusion:     1. Quantitative evaluation shows 57% contribution of the right lung as  compared to 43% contribution of the left lung.     2. Predominantly basilar perfusion abnormalities of the lungs.

## 2018-02-22 NOTE — PROGRESS NOTES
Internal Medicine [facility] Progress Note   Date of Service: 2/22/2018    Patient: Kecia Blue  MRN: [id number]  Admission Date: 2/21/2018  Hospital Day # 1    Assessment & Plan:     Kecia Blue is a 57 year old female with history of dermatomyositis, seronegative RA, ILD, and pulmonary hypertension who is undergoing lung transplant workup and is admitted from cath lab for increasing shortness of breath and hypoxia.    Changes Today:  -- CT-chest  -- ENT consult  -- Rheumatology consult  -- Clarify O2 goals (88-92%)  -- Pulmonary rehab consult     # Acute on chronic hypoxic respiratory failure  # ILD related to dermatomyositis, undergoing transplant evaluation  # Severe pulmonary hypertension (WHO-III)  Suspect worsening hypoxia is related to progression of disease. No evidence of acute infection, however given immunocompromised status, infectious workup is pending. PE is on differential given sedentary lifestyle most recently, hypoxia, and tachypnea, and at this point progression of disease seems more likely. No history of L heart failure, and cath with clean coronaries and normal left-sided pressures. She does have severe pHTN, but no evidence of volume overload on exam. Her transplant workup will be completed while inpatient.   -- Infectious workup: blood cultures, UA/UC, CXR, sputum cultures, influenza/RSV PCR  -- Pulm consult, appreciate involvement   -- Complete transplant workup:    -- ENT consult due to increased uptake at base of tongue on PET   -- Rheum consult to comment on underlying disease per transplant surgery team   -- CT-chest  -- Supplemental O2, wean as able; HFNC w/ goal SaO2 88-92%   -- Continue increased prednisone dose of 20mg qday, no higher due to transplant workup  -- Continue azathioprine, tacrolimus at current doses  -- Continue Bactrim and Nystatin  -- Pulmonary rehab consult     ===Chronic conditions===  # Rheumatoid arthritis: Continue immunosuppressive meds as above.  "Rheumatology consult as noted above to comment on underlying disease per transplant surgery team.      FEN: Bolus prn  Lytes replacement protocol  Regular diet (NPO if on BiPAP)  Prophylaxis:  DVT: Lovenox   GI: None  Disposition: Inpatient status, suspect hospitalization may be prolonged given current oxygen needs  Code Status: FULL CODE       Patient was seen and discussed with Dr. Araya who agreed with the above.    Kiersten Eli MD  Internal Medicine PGY-2  P: [phone]    ___________________________________________________________________    Subjective & Interval Hx:    Nursing notes reviewed, no acute events overnight. Patient remained on HFNC overnight and slept ok. She is feeling better with the increased dose of prednisone, has more energy. This morning she had a coughing fit that resulted in her O2 sats dropping to the 50s. She was placed on BiPAP and recovered in a couple of minutes. She continues to deny any nasal congestion, increased cough/sputum production from baseline, fevers/chills, chest pain. Feels her breathing is actually much improved from what it had been over the past 1-2 months.     ROS: The remainder of a 10 point ROS is negative unless otherwise noted above.    Medications: Reviewed in EPIC. List below for reference    Physical Exam:    Blood pressure 120/82, pulse 88, temperature 97.9  F (36.6  C), temperature source Oral, resp. rate 26, height 1.6 m (5' 3\"), weight 50.3 kg (111 lb), SpO2 (!) 86 %.    General: Thin woman in no acute distress, sitting up in bed   HEENT: HFNC in place, sclera anicteric, MMM  Neck: no JVD  Chest/Resp: Diffuse bilateral fine crackles, no coarse crackles, no wheezes, no rhonchi. Tachypneic. Increased work of breathing. Speaking in complete sentences. Appears comfortable on hi-flow nasal canula.   Heart/CV: RRR, no m/r/g  Abdomen/GI: NABS, soft, nondistended, nontender  Extremities/MSK: No edema, cyanosis of bilateral hands and feet, extremities " cool  Skin: No concerning rashes/lesions  Neuro/Psych: AOx3, CN's grossly intact, symmetric strength, nonfocal exam. Mood is good, affect is cheerful and bright.     Labs & Studies of Note:   139  105    23   /  --------------------- 110   4.5    26   1.05 \  Mg 1.6  Ca 8.9              \ 14 /   12.1 -------- 362     Micro:   Bcx x2 2/21/18: NGTD  UA 2/21/18: Negative for signs of infection  Sputum cx: unable to obtain, cough not productive  Influenza A/B & RSV PCR: pending    Imaging:  CT-chest 2/22/2018:   Pending    Medications list for Reference   Current Facility-Administered Medications   Medication     lidocaine 1 % 1 mL     lidocaine (LMX4) kit     sodium chloride (PF) 0.9% PF flush 3 mL     sodium chloride (PF) 0.9% PF flush 3 mL     HOLD:  Metformin and metformin containing medications if patient received IV contrast     naloxone (NARCAN) injection 0.1-0.4 mg     azaTHIOprine (IMURAN) tablet 50 mg     nystatin (MYCOSTATIN) suspension 500,000 Units     sulfamethoxazole-trimethoprim (BACTRIM DS/SEPTRA DS) 800-160 MG per tablet 1 tablet     tacrolimus (GENERIC EQUIVALENT) capsule 4 mg     melatonin tablet 1 mg     sodium chloride (PF) 0.9% PF flush 3 mL     sodium chloride (PF) 0.9% PF flush 3 mL     enoxaparin (LOVENOX) injection 40 mg     magnesium sulfate 4 g in 100 mL sterile water (premade)     potassium chloride SA (K-DUR/KLOR-CON M) CR tablet 20-40 mEq     potassium chloride (KLOR-CON) Packet 20-40 mEq     potassium chloride 10 mEq in 100 mL sterile water intermittent infusion (premix)     potassium chloride 10 mEq in 100 mL intermittent infusion with 10 mg lidocaine     potassium chloride 20 mEq in 50 mL intermittent infusion     acetaminophen (TYLENOL) tablet 650 mg     senna-docusate (SENOKOT-S;PERICOLACE) 8.6-50 MG per tablet 1 tablet    Or     senna-docusate (SENOKOT-S;PERICOLACE) 8.6-50 MG per tablet 2 tablet     polyethylene glycol (MIRALAX/GLYCOLAX) Packet 17 g     ondansetron (ZOFRAN-ODT)  ODT tab 4 mg    Or     ondansetron (ZOFRAN) injection 4 mg     prochlorperazine (COMPAZINE) injection 10 mg    Or     prochlorperazine (COMPAZINE) tablet 10 mg    Or     prochlorperazine (COMPAZINE) Suppository 25 mg     No lozenges or gum should be given while patient on BIPAP/AVAPS/AVAPS AE     hypromellose-dextran (ARTIFICAL TEARS) ophthalmic solution 1 drop     Patient may continue current oral medications     predniSONE (DELTASONE) tablet 20 mg     aspirin EC EC tablet 81 mg     I have seen this patient with the resident teaching team,  examined patient independently, and agree with above note and exam.  Per pulm team, likely to be unsafe to discharge prior to desired lung tx.    Morgan Araya MD  Med-Peds Hospitalist, pager [phone]

## 2018-02-22 NOTE — CONSULTS
Otolaryngology Consult Note  February 22, 2018      CC: PET uptake in left tongue base/soft palate and right thyroid cartilage    HPI: Kecia Blue is a 57 year old female with a past medical history of dermatomyositis, seronegative RA, pulmonary HTN, ILD undergoing lung transplant workup who was admitted yesterday after heart cath for hypoxia and SOB. ENT was consulted this morning as part of ongoing transplant workup due to increased uptake in the left tongue base, soft palate and right aspect of the thyroid cartilate on outside PET scan from 1/16/2018. No masses were seen on CT to correlate with the increased uptake.    Patient denies any current head or neck concerns. She denies vision changes, ear pain, tinnitus, hearing loss, dizziness/vertigo, nasal congestion, nasal drainage or post-nasal drip, sore throat, difficulty or pain with swallowing, slurred speech, voice changes, lumps/bumps in the neck, numbness/tingling in the face or palate. She is tolerating a regular diet without issue. Patient has never had any head or neck surgeries aside from tonsillectomy as a child. She reports she had a cold virus at the time of the PET scan in January.     Past Medical History:   Diagnosis Date     Antisynthetase syndrome (H) 2014     Chronic cough      Dermatomyositis (H)      Dysplasia of cervix, low grade (ESTRADA 1)      ILD (interstitial lung disease) (H)      Osteopenia      Pulmonary hypertension      Raynaud's disease      Seronegative rheumatoid arthritis (H)        Past Surgical History:   Procedure Laterality Date     ENT SURGERY      tonsillectomy as a child     no prior surgery         No current outpatient prescriptions on file.        No Known Allergies    Social History     Social History     Marital status:      Spouse name: N/A     Number of children: N/A     Years of education: N/A     Occupational History     Not on file.     Social History Main Topics     Smoking status: Never Smoker      "Smokeless tobacco: Never Used     Alcohol use 0.6 oz/week     1 Glasses of wine per week      Comment: 1 glass 3 times weekly     Drug use: No     Sexual activity: Not on file     Other Topics Concern     Parent/Sibling W/ Cabg, Mi Or Angioplasty Before 65f 55m? No     Social History Narrative       Family History   Problem Relation Age of Onset     Hypertension Mother      Arthritis Mother      Pancreatic Cancer Father      DIABETES Father        ROS: 12 point review of systems is negative unless noted in HPI.    PHYSICAL EXAM:  General: sitting up in bed, no acute distress  /81 (BP Location: Left arm)  Pulse 88  Temp 98.3  F (36.8  C) (Oral)  Resp 28  Ht 1.6 m (5' 3\")  Wt 50.3 kg (111 lb)  SpO2 96%  BMI 19.66 kg/m2  HEAD: normocephalic, atraumatic  Face: symmetrical, CN VII intact bilaterally (HB 1), no swelling, edema, or erythema. Sensation V1-V3 intact and equal bilaterally.   Eyes: EOMI without spontaneous or gaze evoked nystagmus, PERRL, clear sclera  Ears: external auricles appear normal  Nose: no anterior drainage, see endoscopy below  Mouth: moist, no ulcers, no jaw or tooth tenderness, tongue midline and symmetric. FOM soft, no masses of the mobile tongue, tongue base soft to palpation.   Oropharynx: tonsils absent, uvula midline, no oropharyngeal erythema, soft palate appears symmetric with symmetric rise.   Neck: no LAD, normal thyroid cartilage and cricoid cartilage landmarks, no palpable masses and no overlying skin changes, trachea midline  Neuro: cranial nerves 2-12 grossly intact  Respiratory: no stridor, 100% FiO2 via high flow NC    FIBEROPTIC ENDOSCOPY:  Due to PET findings, fiberoptic laryngoscopy was indicated. After obtaining verbal consent, the nose was topically decongested and anesthetized. The fiberoptic laryngoscope was passed under endoscopic vision through the left nasal passage. There is a large anterior septal perforation with some dried crusting on the edges, appears " well healed and chronic, no bleeding. The turbinates were normal. The inferior and middle meati were clear bilaterally without purulence, masses, or polyps. The nasopharynx was clear. The eustachian tubes were clear. The soft palate appeared normal with good mobility. The epiglottis was sharp, and the visualized portion of the vallecula was clear, tongue base symmetric without any masses or lesions. The larynx was clear with mobile cords. The arytenoids were clear, and there was no pooling in the hypopharynx.    ROUTINE IP LABS (Last four results)  BMP  Recent Labs  Lab 02/22/18  0503 02/21/18  1439 02/19/18  0759    142 141   POTASSIUM 4.5 4.3 4.0   CHLORIDE 105 107 106   CHELSEY 8.9 8.6 8.9   CO2 26 28 28   BUN 23 16 20   CR 1.05* 0.93 1.02   * 96 90     CBC  Recent Labs  Lab 02/22/18  0503 02/21/18  1439 02/19/18  0759   WBC 12.1* 14.0* 11.9*   RBC 4.09 4.16 4.11   HGB 14.0 14.1 13.9   HCT 42.5 43.5 42.8   * 105* 104*   MCH 34.2* 33.9* 33.8*   MCHC 32.9 32.4 32.5   RDW 15.4* 15.6* 14.8    348 349     INR  Recent Labs  Lab 02/19/18  0759   INR 1.01     CT Scan 1/28/2018: Imaging personally reviewed. No masses or asymmetry visualized in the tongue base, soft palate, or thyroid cartilage.     Assessment and Plan  Kecia Blue is a 57 year old female with a past medical history of dermatomyositis, seronegative RA, pulmonary HTN, ILD undergoing lung transplant workup who was admitted yesterday after heart cath for hypoxia and SOB. ENT was consulted this morning as part of ongoing transplant workup due to increased uptake in the left tongue base, soft palate and right aspect of the thyroid cartilate on outside PET scan from 1/16/2018. Normal head and neck exam, no masses or lesions visible on flexible laryngoscopy. No ENT concerns in regard to proceeding with transplant. Patient does have a large anterior nasal septum perforation that is likely chronic, no concerning masses or other findings  in the nose. No intervention needed for perforation at this time, patient counseled she can pursue ENT follow up for this as needed after her transplant.    -- Will discuss patient and above plan with ENT attending on call, Dr. Márquez.     Vanna Baca PA-C  Otolaryngology-Head & Neck Surgery  Please page ENT with questions by dialing [phone] and entering job code [phone] when prompted.

## 2018-02-22 NOTE — PLAN OF CARE
"Problem: Patient Care Overview  Goal: Plan of Care/Patient Progress Review  Outcome: No Change  BP (!) 144/103 (BP Location: Left arm)  Pulse 88  Temp 97.9  F (36.6  C) (Oral)  Resp (!) 36  Ht 1.6 m (5' 3\")  Wt 51.3 kg (113 lb)  SpO2 92%  BMI 20.02 kg/m2    VSS. % HFNC or 70% Bipap. A&O x4. Denies pain. S/p heart cath. R radial puncture site with small hematoma/bruising. CMS/pulses intact. Tolerating regular diet. Up to commode with SBA. Continue to monitor.     Problem: Chronic Respiratory Difficulty Comorbidity  Goal: Chronic Respiratory Difficulty  Patient comorbidity will be monitored for signs and symptoms of Respiratory Difficulty (Chronic) condition.  Problems will be absent, minimized or managed by discharge/transition of care.  Outcome: No Change  Pt on HFNC @ 100%, continuous pulse ox monitoring. Lung tx workup      "

## 2018-02-23 LAB
ALBUMIN UR-MCNC: 10 MG/DL
ANION GAP SERPL CALCULATED.3IONS-SCNC: 7 MMOL/L (ref 3–14)
ANION GAP SERPL CALCULATED.3IONS-SCNC: 7 MMOL/L (ref 3–14)
APPEARANCE UR: CLEAR
BILIRUB UR QL STRIP: NEGATIVE
BUN SERPL-MCNC: 19 MG/DL (ref 7–30)
BUN SERPL-MCNC: 25 MG/DL (ref 7–30)
CALCIUM SERPL-MCNC: 8.4 MG/DL (ref 8.5–10.1)
CALCIUM SERPL-MCNC: 8.9 MG/DL (ref 8.5–10.1)
CENTROMERE IGG SER-ACNC: 0.2 AI (ref 0–0.9)
CHLORIDE SERPL-SCNC: 106 MMOL/L (ref 94–109)
CHLORIDE SERPL-SCNC: 106 MMOL/L (ref 94–109)
CO2 SERPL-SCNC: 26 MMOL/L (ref 20–32)
CO2 SERPL-SCNC: 28 MMOL/L (ref 20–32)
COLOR UR AUTO: YELLOW
CREAT SERPL-MCNC: 0.92 MG/DL (ref 0.52–1.04)
CREAT SERPL-MCNC: 1.15 MG/DL (ref 0.52–1.04)
ENA SCL70 IGG SER IA-ACNC: <0.2 AI (ref 0–0.9)
ERYTHROCYTE [DISTWIDTH] IN BLOOD BY AUTOMATED COUNT: 15.3 % (ref 10–15)
GFR SERPL CREATININE-BSD FRML MDRD: 49 ML/MIN/1.7M2
GFR SERPL CREATININE-BSD FRML MDRD: 63 ML/MIN/1.7M2
GLUCOSE SERPL-MCNC: 220 MG/DL (ref 70–99)
GLUCOSE SERPL-MCNC: 88 MG/DL (ref 70–99)
GLUCOSE UR STRIP-MCNC: 150 MG/DL
HCT VFR BLD AUTO: 41.6 % (ref 35–47)
HGB BLD-MCNC: 13.6 G/DL (ref 11.7–15.7)
HGB UR QL STRIP: NEGATIVE
IGE SERPL-ACNC: <2 KIU/L (ref 0–114)
KETONES UR STRIP-MCNC: NEGATIVE MG/DL
LEUKOCYTE ESTERASE UR QL STRIP: NEGATIVE
MAGNESIUM SERPL-MCNC: 1.6 MG/DL (ref 1.6–2.3)
MCH RBC QN AUTO: 33.6 PG (ref 26.5–33)
MCHC RBC AUTO-ENTMCNC: 32.7 G/DL (ref 31.5–36.5)
MCV RBC AUTO: 103 FL (ref 78–100)
MUCOUS THREADS #/AREA URNS LPF: PRESENT /LPF
NITRATE UR QL: NEGATIVE
PH UR STRIP: 6.5 PH (ref 5–7)
PLATELET # BLD AUTO: 349 10E9/L (ref 150–450)
POTASSIUM SERPL-SCNC: 4.2 MMOL/L (ref 3.4–5.3)
POTASSIUM SERPL-SCNC: 4.2 MMOL/L (ref 3.4–5.3)
RBC # BLD AUTO: 4.05 10E12/L (ref 3.8–5.2)
RBC #/AREA URNS AUTO: 2 /HPF (ref 0–2)
SODIUM SERPL-SCNC: 138 MMOL/L (ref 133–144)
SODIUM SERPL-SCNC: 140 MMOL/L (ref 133–144)
SOURCE: ABNORMAL
SP GR UR STRIP: 1.01 (ref 1–1.03)
SQUAMOUS #/AREA URNS AUTO: 2 /HPF (ref 0–1)
UROBILINOGEN UR STRIP-MCNC: NORMAL MG/DL (ref 0–2)
WBC # BLD AUTO: 10.2 10E9/L (ref 4–11)
WBC #/AREA URNS AUTO: <1 /HPF (ref 0–2)

## 2018-02-23 PROCEDURE — 25000125 ZZHC RX 250: Performed by: STUDENT IN AN ORGANIZED HEALTH CARE EDUCATION/TRAINING PROGRAM

## 2018-02-23 PROCEDURE — 83735 ASSAY OF MAGNESIUM: CPT | Performed by: STUDENT IN AN ORGANIZED HEALTH CARE EDUCATION/TRAINING PROGRAM

## 2018-02-23 PROCEDURE — 36415 COLL VENOUS BLD VENIPUNCTURE: CPT | Performed by: STUDENT IN AN ORGANIZED HEALTH CARE EDUCATION/TRAINING PROGRAM

## 2018-02-23 PROCEDURE — 25000131 ZZH RX MED GY IP 250 OP 636 PS 637: Performed by: STUDENT IN AN ORGANIZED HEALTH CARE EDUCATION/TRAINING PROGRAM

## 2018-02-23 PROCEDURE — 81001 URINALYSIS AUTO W/SCOPE: CPT | Performed by: STUDENT IN AN ORGANIZED HEALTH CARE EDUCATION/TRAINING PROGRAM

## 2018-02-23 PROCEDURE — 80048 BASIC METABOLIC PNL TOTAL CA: CPT | Performed by: STUDENT IN AN ORGANIZED HEALTH CARE EDUCATION/TRAINING PROGRAM

## 2018-02-23 PROCEDURE — 40000275 ZZH STATISTIC RCP TIME EA 10 MIN

## 2018-02-23 PROCEDURE — 25000132 ZZH RX MED GY IP 250 OP 250 PS 637: Performed by: STUDENT IN AN ORGANIZED HEALTH CARE EDUCATION/TRAINING PROGRAM

## 2018-02-23 PROCEDURE — 12000006 ZZH R&B IMCU INTERMEDIATE UMMC

## 2018-02-23 PROCEDURE — 85027 COMPLETE CBC AUTOMATED: CPT | Performed by: STUDENT IN AN ORGANIZED HEALTH CARE EDUCATION/TRAINING PROGRAM

## 2018-02-23 PROCEDURE — 25000128 H RX IP 250 OP 636: Performed by: STUDENT IN AN ORGANIZED HEALTH CARE EDUCATION/TRAINING PROGRAM

## 2018-02-23 PROCEDURE — 99233 SBSQ HOSP IP/OBS HIGH 50: CPT | Mod: GC | Performed by: PEDIATRICS

## 2018-02-23 RX ORDER — MAGNESIUM SULFATE HEPTAHYDRATE 40 MG/ML
4 INJECTION, SOLUTION INTRAVENOUS EVERY 4 HOURS PRN
Status: DISCONTINUED | OUTPATIENT
Start: 2018-02-23 | End: 2018-02-26

## 2018-02-23 RX ADMIN — ASPIRIN 81 MG: 81 TABLET, COATED ORAL at 08:16

## 2018-02-23 RX ADMIN — Medication 75 MG: at 17:23

## 2018-02-23 RX ADMIN — ENOXAPARIN SODIUM 40 MG: 40 INJECTION SUBCUTANEOUS at 16:49

## 2018-02-23 RX ADMIN — Medication 2 G: at 14:05

## 2018-02-23 RX ADMIN — SODIUM CHLORIDE, POTASSIUM CHLORIDE, SODIUM LACTATE AND CALCIUM CHLORIDE 1000 ML: 600; 310; 30; 20 INJECTION, SOLUTION INTRAVENOUS at 09:40

## 2018-02-23 RX ADMIN — PREDNISONE 20 MG: 20 TABLET ORAL at 08:16

## 2018-02-23 RX ADMIN — NYSTATIN 500000 UNITS: 100000 SUSPENSION ORAL at 08:15

## 2018-02-23 RX ADMIN — Medication 75 MG: at 08:16

## 2018-02-23 RX ADMIN — SULFAMETHOXAZOLE AND TRIMETHOPRIM 1 TABLET: 800; 160 TABLET ORAL at 08:15

## 2018-02-23 RX ADMIN — NYSTATIN 500000 UNITS: 100000 SUSPENSION ORAL at 16:49

## 2018-02-23 RX ADMIN — TACROLIMUS 4 MG: 1 CAPSULE ORAL at 08:16

## 2018-02-23 RX ADMIN — TACROLIMUS 4 MG: 1 CAPSULE ORAL at 17:23

## 2018-02-23 ASSESSMENT — ACTIVITIES OF DAILY LIVING (ADL)
ADLS_ACUITY_SCORE: 9

## 2018-02-23 ASSESSMENT — PAIN DESCRIPTION - DESCRIPTORS: DESCRIPTORS: ACHING

## 2018-02-23 NOTE — PLAN OF CARE
Problem: Patient Care Overview  Goal: Plan of Care/Patient Progress Review  OT 6B: Cancel - pt not medically appropriate for Pulm Rehab/OT Evaluation due to increased oxygen requirements. Will reschedule and initiate when medically appropriate.

## 2018-02-23 NOTE — PLAN OF CARE
Problem: Patient Care Overview  Goal: Plan of Care/Patient Progress Review  Pt alert and oriented x4. VSS on % HFNC or 100% BiPAP with activity (bedside commode). Pt to be listed for lung transplant. Influenza A, B and RSV negative. Good UOP, BM today. Pt has intermittent coughing spells that require 100% BiPAP and 100% HFNC alternating.  Ranjan at the bedside and very supportive. Will continue to monitor per plan of care.

## 2018-02-23 NOTE — PROGRESS NOTES
[facility], [place]    Sepsis Evaluation Progress Note    Date of Service: 02/22/2018    I was called to see Kecia Blue due to abnormal vital signs triggering the Sepsis SIRS screening alert. She is not known to have an infection. The reason she triggered is because of respiratory rate which is her baseline and WBC count of 12 this morning. Patient however is afebrile and rest of her vitals are stable. She denies any worsening shortness of breath or cough, fever, chills, dysuria or abdominal pain.     Physical Exam    Vital Signs:  Temp: 98.2  F (36.8  C) Temp src: Oral BP: (!) 127/92   Heart Rate: 94 Resp: (!) 32 SpO2: 96 % O2 Device: High Flow Nasal Cannula (HFNC)      Lab:  Lactic Acid   Date Value Ref Range Status   02/21/2018 0.7 0.7 - 2.0 mmol/L Final       The patient is at baseline mental status.    The rest of their physical exam is significant for diffuse bilateral crackles through out, no change from baseline, no wheeze. tachypenic at baseline. Speaking full sentence and appears comfortable. Rest of exam is benign      Assessment and Plan    The SIRS and exam findings are likely due to hypoxic respiratory failure 2/2 ILD. elevated lacated is likely because patient is volume down and intervasculary depleted. , there is no sign of sepsis at this time.    Disposition: The patient will remain on the current unit. We will continue to monitor this patient closely     Ordered 500 cc LR and will recheck lactate in 2 hours.    - Lactated down trended 3.1 -> 2     Pam Powell MD

## 2018-02-23 NOTE — PROGRESS NOTES
Spoke with Kecia and Ranjan by phone last evening.  They were aware that Kecia was approved for lung transplant pending completion of consults and CT scan.  She had not seen the surgeon yet at that time.  Confirmed that once all consults are completed and she is cleared for transplant, will request urgent insurance approval to be listed.  Hope to have approval to list on Friday.    Kecia has been cleared by ENT, Rheumatology and Transplant Surgeon.  Currently waiting for approval to list.

## 2018-02-23 NOTE — PROGRESS NOTES
"Pulmonary Progress Note    Subjective  No acute events overnight. Comfortable on HFNC at 100% FiO2. Uses BiPAP with movements to the commode. Slept well last night. Appetite is good. No nausea or vomiting. No abdominal pain. Having regular bowel movements.    Objective  /85 (BP Location: Left arm)  Pulse 88  Temp 98  F (36.7  C) (Oral)  Resp 28  Ht 1.6 m (5' 3\")  Wt 50.3 kg (110 lb 12.8 oz)  SpO2 91%  BMI 19.63 kg/m2 on FiO2 100%  General: Sitting up in bed, NAD  Heart: Normal rate, regular rhythm  Lungs: Clear in anterior fields.  Abd: Soft, nontender, nondistended  Ext: No edema  Neuro: Awake and answering questions appropriately.    Labs personally reviewed.    Assessment and Plan  Ms. Blue is a 57-year-old female with history of dermatomyositis with associated ILD, pulmonary hypertension, and seronegative RA who was admitted from cath lab for increasing shortness of breath and hypoxia.     Chronic Hypoxic Respiratory Failure  Dermatomyositis with associated ILD  Pulmonary HTN  Patient's ongoing hypoxia likely secondary to progression of her ILD. PFTs in 1/2018 show FEV1 of 0.92L (35%) DLCO of 27% predicted. 6 minute walk test completed that time showed desaturations with walking with 10L of oxygen. Patient had normal right and left filling pressures on RHC, ruling out volume overload. CXR on admission without obvious infiltrate to suggest pneumonia.     --Continue prednisone 20mg daily (increased from PTA 10mg after discussion with Dr. Hood)  --ENT, rheumatology, and transplant surgery consults completed, no contraindications to listing. Transplant team to try to obtain insurance approval today and hopeful to have patient listed today.  --Continue PTA tacrolimus, azathioprine, and Bactrim.  --If patient is listed, then will remain in hospital until transplant.   --Continue physical therapy.    Thank you very much for this consultation. Pulmonary will continue to follow.     Patient discussed " with Dr. Bruno.     Juana Baugh  Pulmonary and Critical Care Fellow  [phone]

## 2018-02-23 NOTE — PROGRESS NOTES
Received insurance approval to list for lung transplant.  Confirmed that Kecia is ready to proceed with listing for lung transplant.  Verified blood type as A per transplant office protocol.   Listed in [facility] for bilateral lung transplant as was recommended by the lung transplant team.  Contacted patient to confirm listing and verified that LAS score is 88.95 which puts her on the top of this program's A blood group list.  Kecia will require a virtual crossmatch.    Reminded Kecia that the call may come at any time.  The on-call team is aware that she is in the hospital and will contact the hospital unit and her when there is a donor offer.  Anticipate that she will remain in the hospital until she receives a transplant.   Notified surgeons and coordinators on call of new listing.   Wait list notification letter sent to Kecia and referring and primary care physicians.

## 2018-02-23 NOTE — PROGRESS NOTES
Internal Medicine [facility] Progress Note   Date of Service: 2/22/2018    Patient: Kecia Blue  MRN: [id number]  Admission Date: 2/21/2018  Hospital Day # 2    Assessment & Plan:     Kecia Blue is a 57 year old female with history of dermatomyositis, seronegative RA, ILD, and pulmonary hypertension who is undergoing lung transplant workup and is admitted from cath lab for increasing shortness of breath and hypoxia.    Changes Today:  -- LR bolus  -- Recheck BMP this afternoon  -- UA  -- Replete Mg    # Acute on chronic hypoxic respiratory failure  # ILD related to dermatomyositis, undergoing transplant evaluation  # Severe pulmonary hypertension (WHO-III)  Suspect worsening hypoxia is related to progression of disease. No evidence of acute infection, however given immunocompromised status, infectious workup initiated and unrevealing. PE is on differential given sedentary lifestyle most recently, hypoxia, and tachypnea, but at this point progression of disease seems much more likely. No history of L heart failure, and cath with clean coronaries and normal left-sided pressures. She does have severe pHTN, but no evidence of volume overload on exam. Her transplant workup will be completed while inpatient. ENT eval-ed, no abnormality seen on laryngoscopy. Rheumatology eval-ed, pt w/ good control of underlying rheumatologic disease, additional diagnostic tests ordered that will not impact transplant status. CT-chest 2/22/18 w/ pneumomediastinum, per thoracic surgery ok to observe.   -- Pulm consult, appreciate involvement   -- Supplemental O2, wean as able; HFNC w/ goal SaO2 88-92%   -- Continue increased prednisone dose of 20mg qday, no higher due to transplant workup  -- Continue azathioprine, tacrolimus at current doses  -- Continue Bactrim and Nystatin  -- Pulmonary rehab consult  -- Await final decision re: list for transplant    # Elevated creatinine  Baseline 0.8, not yet RADHA, but trending up over  "past two days. Patient endorses poor PO intake. BUN:Cr consistent with prerenal.   -- LR bolus today, consider starting maintenance fluids if patient not able to maintain adequate PO  -- Repeat BMP this afternoon  -- UA     ===Chronic conditions===  # Rheumatoid arthritis: Continue immunosuppressive meds as above. Rheumatology consulted.      FEN: Bolus prn  Lytes replacement protocol  Regular diet (NPO if on BiPAP)  Prophylaxis:  DVT: Lovenox   GI: None  Disposition: Inpatient status, suspect hospitalization may be prolonged given current oxygen needs. Likely inpatient until desired transplant.   Code Status: FULL CODE       Patient was seen and discussed with Dr. Araya who agreed with the above.    Kiersten Eli MD  Internal Medicine PGY-2  P: [phone]    ___________________________________________________________________    Subjective & Interval Hx:    Nursing notes reviewed, no acute events overnight. Triggered sepsis protocol overnight. Lactate was 3.1 and improved to 2 after 500cc bolus. She endorses not drinking very well yesterday. Otherwise denies any fevers/chills, increased SOB from baseline, change in cough, n/v/d.     ROS: The remainder of a 6 point ROS is negative unless otherwise noted above.    Medications: Reviewed in EPIC. List below for reference    Physical Exam:    Blood pressure 123/85, pulse 88, temperature 98  F (36.7  C), temperature source Oral, resp. rate 28, height 1.6 m (5' 3\"), weight 50.3 kg (110 lb 12.8 oz), SpO2 91 %.    General: Thin woman in no acute distress, sitting up in bed, finishing breakfast  HEENT: HFNC in place, sclera anicteric, MMM  Neck: no JVD  Chest/Resp: Diffuse bilateral fine crackles, no coarse crackles, no wheezes, no rhonchi. Tachypneic. Increased work of breathing. Speaking in complete sentences. Appears comfortable on hi-flow nasal canula.   Heart/CV: RRR, no m/r/g  Abdomen/GI: NABS, soft, nondistended, nontender  Extremities/MSK: No edema, cyanosis of " bilateral hands and feet, extremities cool  Skin: No concerning rashes/lesions  Neuro/Psych: AOx3, CN's grossly intact, symmetric strength, nonfocal exam. Mood is good, affect is cheerful and bright.     Labs & Studies of Note:   140  106    25   /  --------------------- 88   4.2    28   1.15 \  Mg 1.6  Ca 8.9              \ 13.6 /   10.2 --------- 349    Micro:   Bcx x2 2/21/18: NGTD  UA 2/21/18: Negative for signs of infection  Sputum cx: unable to obtain, cough not productive  Influenza A/B & RSV PCR: negative  UA 2/23/18: pending    Imaging:  CT-chest 2/22/2018:   IMPRESSION:   1. Unchanged findings consistent with known interstitial lung disease.  Findings are inconsistent with UIP pattern. Differential includes  NSIP.  2. Pneumomediastinum extending into the subcutaneous soft tissue of  the left neck. These findings may be seen with interstitial lung  disease, although differential includes infection and barotrauma.  Correlate with any recent procedures.    Medications list for Reference   Current Facility-Administered Medications   Medication     magnesium sulfate 2 g in NS intermittent infusion ([facility] or [facility] C[facility]d)     magnesium sulfate 4 g in 100 mL sterile water (premade)     benzonatate (TESSALON) capsule 100 mg     azaTHIOprine (IMURAN) half-tab 75 mg     lidocaine 1 % 1 mL     lidocaine (LMX4) kit     sodium chloride (PF) 0.9% PF flush 3 mL     sodium chloride (PF) 0.9% PF flush 3 mL     HOLD:  Metformin and metformin containing medications if patient received IV contrast     naloxone (NARCAN) injection 0.1-0.4 mg     nystatin (MYCOSTATIN) suspension 500,000 Units     sulfamethoxazole-trimethoprim (BACTRIM DS/SEPTRA DS) 800-160 MG per tablet 1 tablet     tacrolimus (GENERIC EQUIVALENT) capsule 4 mg     melatonin tablet 1 mg     sodium chloride (PF) 0.9% PF flush 3 mL     sodium chloride (PF) 0.9% PF flush 3 mL     enoxaparin (LOVENOX) injection 40 mg     potassium chloride SA (K-DUR/KLOR-CON M)  CR tablet 20-40 mEq     potassium chloride (KLOR-CON) Packet 20-40 mEq     potassium chloride 10 mEq in 100 mL sterile water intermittent infusion (premix)     potassium chloride 10 mEq in 100 mL intermittent infusion with 10 mg lidocaine     potassium chloride 20 mEq in 50 mL intermittent infusion     acetaminophen (TYLENOL) tablet 650 mg     senna-docusate (SENOKOT-S;PERICOLACE) 8.6-50 MG per tablet 1 tablet    Or     senna-docusate (SENOKOT-S;PERICOLACE) 8.6-50 MG per tablet 2 tablet     polyethylene glycol (MIRALAX/GLYCOLAX) Packet 17 g     ondansetron (ZOFRAN-ODT) ODT tab 4 mg    Or     ondansetron (ZOFRAN) injection 4 mg     prochlorperazine (COMPAZINE) injection 10 mg    Or     prochlorperazine (COMPAZINE) tablet 10 mg    Or     prochlorperazine (COMPAZINE) Suppository 25 mg     No lozenges or gum should be given while patient on BIPAP/AVAPS/AVAPS AE     hypromellose-dextran (ARTIFICAL TEARS) ophthalmic solution 1 drop     Patient may continue current oral medications     predniSONE (DELTASONE) tablet 20 mg     aspirin EC EC tablet 81 mg       I have seen this patient with the resident teaching team,  examined patient independently, and agree with above note and exam.    Morgan Araya MD  Med-Peds Hospitalist, pager [phone]

## 2018-02-23 NOTE — PROVIDER NOTIFICATION
Time of notification: 11:45 AM  MD notified: Dr. Eli   Reason for notification: Received call from Tele tech reporting pt had at least 2 short runs of A-fib with the longest run being 13 beats up to 156 bpm at 1126, shorter run of 8 beats around the same time.   Patient status: Stable at this time. Pt asymptomatic.  Temp:  [98  F (36.7  C)-98.4  F (36.9  C)] 98  F (36.7  C)  Heart Rate:  [80-96] 93  Resp:  [24-36] 28  BP: (115-138)/(85-96) 123/85  FiO2 (%):  [80 %-100 %] 93 %  SpO2:  [89 %-99 %] 91 %  Orders received:   Magnesium level added on to morning labs, added Mg replacement protocol (2G infused) & orders for UA.

## 2018-02-23 NOTE — PROGRESS NOTES
Transplant Admission Psychosocial Assessment    Patient Name: Kecia Blue  : 1960  Age: 57 year old  MRN: [id number]  Date of Initial Social Work Evaluation: 2018  patient seen in [facility] on  for psychosocial assessment as part of lung transplant.  See this note for full details. patient was completing out patient evaluation, condition deteriorated, was admitted and as of this afternoon is listed for lung transplant.   Met with patient and spouse in hospital yesterday and with spouse today.  Discussed availability of [facility] apartment and facilitated rental.  Supportive visit with spouse today following listing for transplant.  He is grateful for expedient listing.  Hopeful that patient will do well.  Tearful and admits to having some frightening days.  Knows there will be many ups and downs.      Presenting Information   Living Situation: 2 story home in [place], [place].  2 hours away by car.    If not local, plans for short term stay:  [facility] apartment rente today.    Previous Functional Status: prior to admission.   Very limited.    On 15 liters of O2.  Can move from bed to bath, and bed to chair in living room.  Spends most of the day in the chair.  Significantly short of breath with activity or talking.  now hospitalized on 100% o2 and bipap.    Cultural/Language/Spiritual Considerations: n/a    Support System  Primary Support Person spouse, Ranjan  Other support:  3 adult daughters.    Plan for support in immediate post-transplant period: Ranjan.  With respite from daughters.     Health Care Directive  Decision Maker: patient   Alternate Decision Maker: spouse  Health Care Directive: Copy in Chart    Mental Health/Coping:   History of Mental Health: none  History of Chemical Health: none  Current status: frightened by rapid deterioration.    Coping: family support  Services Needed/Recommended: none at this time.      Financial   Income: wages and farming income.    Impact of  transplant on income: none.    Insurance and medication coverage: private insurance.  $6500 out of pocket and then 100% coverage.    Financial concerns: none  Resources needed: none at this time.     Education provided by SW: Social Work role inpatient setting, availability of support groups, parking information and lodging options.      Assessment and recommendations and plan:  Patient now listed for lung transplant.  Will remain inpatient until transplant.  will continue to follow for support, counseling, education and resources.

## 2018-02-23 NOTE — PLAN OF CARE
"Problem: Patient Care Overview  Goal: Plan of Care/Patient Progress Review  Outcome: No Change  BP (!) 130/94 (BP Location: Left arm)  Pulse 88  Temp 98.4  F (36.9  C) (Oral)  Resp 24  Ht 1.6 m (5' 3\")  Wt 50.3 kg (111 lb)  SpO2 99%  BMI 19.66 kg/m2  HR sinus rhythm rates 80s, on HFNC at 90% FiO2. Desaturates easily with activity, requires 100% FiO2 with activity. LS crackles to bases. Has BiPap available at bedside for increased O2 needs.  Pt A&Ox4, resting comfortably over night. Using call light appropriately. Pt denies pain. Skin intact. Pt declined to void over night- states she voids a couple times a day and will try in the AM via commode. No BM. Plan to continue to decrease FiO2 when possible. Use Bipap at bedside with activity. Nursing will continue to follow the POC and update the MD team with concerns.      Problem: Chronic Respiratory Difficulty Comorbidity  Goal: Chronic Respiratory Difficulty  Patient comorbidity will be monitored for signs and symptoms of Respiratory Difficulty (Chronic) condition.  Problems will be absent, minimized or managed by discharge/transition of care.   Outcome: No Change  Pt continues to need increased FiO2, % FiO2 via HFNC. Tachypneic rates 20s.      "

## 2018-02-23 NOTE — PROVIDER NOTIFICATION
[facility] crosscover notified of elevated lactic acid 3.1. Orders for 500cc bolus and recheck received. VS unchanged. Will continue to monitor.

## 2018-02-24 ENCOUNTER — APPOINTMENT (OUTPATIENT)
Dept: OCCUPATIONAL THERAPY | Facility: CLINIC | Age: 58
DRG: 003 | End: 2018-02-24
Attending: INTERNAL MEDICINE
Payer: COMMERCIAL

## 2018-02-24 LAB
ANION GAP SERPL CALCULATED.3IONS-SCNC: 6 MMOL/L (ref 3–14)
BUN SERPL-MCNC: 20 MG/DL (ref 7–30)
CALCIUM SERPL-MCNC: 8.2 MG/DL (ref 8.5–10.1)
CHLORIDE SERPL-SCNC: 107 MMOL/L (ref 94–109)
CO2 SERPL-SCNC: 28 MMOL/L (ref 20–32)
CREAT SERPL-MCNC: 0.84 MG/DL (ref 0.52–1.04)
ERYTHROCYTE [DISTWIDTH] IN BLOOD BY AUTOMATED COUNT: 15.1 % (ref 10–15)
GFR SERPL CREATININE-BSD FRML MDRD: 71 ML/MIN/1.7M2
GLUCOSE SERPL-MCNC: 87 MG/DL (ref 70–99)
HCT VFR BLD AUTO: 41.9 % (ref 35–47)
HGB BLD-MCNC: 13.4 G/DL (ref 11.7–15.7)
MAGNESIUM SERPL-MCNC: 1.8 MG/DL (ref 1.6–2.3)
MCH RBC QN AUTO: 33.3 PG (ref 26.5–33)
MCHC RBC AUTO-ENTMCNC: 32 G/DL (ref 31.5–36.5)
MCV RBC AUTO: 104 FL (ref 78–100)
PLATELET # BLD AUTO: 339 10E9/L (ref 150–450)
POTASSIUM SERPL-SCNC: 4.2 MMOL/L (ref 3.4–5.3)
RBC # BLD AUTO: 4.02 10E12/L (ref 3.8–5.2)
SODIUM SERPL-SCNC: 141 MMOL/L (ref 133–144)
TPMT BLD-CCNC: 29.4 U/ML (ref 24–44)
WBC # BLD AUTO: 13.3 10E9/L (ref 4–11)

## 2018-02-24 PROCEDURE — 97165 OT EVAL LOW COMPLEX 30 MIN: CPT | Mod: GO

## 2018-02-24 PROCEDURE — 99233 SBSQ HOSP IP/OBS HIGH 50: CPT | Mod: GC | Performed by: INTERNAL MEDICINE

## 2018-02-24 PROCEDURE — 80048 BASIC METABOLIC PNL TOTAL CA: CPT | Performed by: STUDENT IN AN ORGANIZED HEALTH CARE EDUCATION/TRAINING PROGRAM

## 2018-02-24 PROCEDURE — 85027 COMPLETE CBC AUTOMATED: CPT | Performed by: STUDENT IN AN ORGANIZED HEALTH CARE EDUCATION/TRAINING PROGRAM

## 2018-02-24 PROCEDURE — 25000131 ZZH RX MED GY IP 250 OP 636 PS 637: Performed by: STUDENT IN AN ORGANIZED HEALTH CARE EDUCATION/TRAINING PROGRAM

## 2018-02-24 PROCEDURE — 25000125 ZZHC RX 250: Performed by: STUDENT IN AN ORGANIZED HEALTH CARE EDUCATION/TRAINING PROGRAM

## 2018-02-24 PROCEDURE — 40000133 ZZH STATISTIC OT WARD VISIT

## 2018-02-24 PROCEDURE — 83735 ASSAY OF MAGNESIUM: CPT | Performed by: STUDENT IN AN ORGANIZED HEALTH CARE EDUCATION/TRAINING PROGRAM

## 2018-02-24 PROCEDURE — 25000128 H RX IP 250 OP 636: Performed by: STUDENT IN AN ORGANIZED HEALTH CARE EDUCATION/TRAINING PROGRAM

## 2018-02-24 PROCEDURE — 25000132 ZZH RX MED GY IP 250 OP 250 PS 637: Performed by: STUDENT IN AN ORGANIZED HEALTH CARE EDUCATION/TRAINING PROGRAM

## 2018-02-24 PROCEDURE — 36415 COLL VENOUS BLD VENIPUNCTURE: CPT | Performed by: STUDENT IN AN ORGANIZED HEALTH CARE EDUCATION/TRAINING PROGRAM

## 2018-02-24 PROCEDURE — 12000006 ZZH R&B IMCU INTERMEDIATE UMMC

## 2018-02-24 PROCEDURE — 97535 SELF CARE MNGMENT TRAINING: CPT | Mod: GO

## 2018-02-24 PROCEDURE — 97530 THERAPEUTIC ACTIVITIES: CPT | Mod: GO

## 2018-02-24 RX ADMIN — ACETAMINOPHEN 650 MG: 325 TABLET, FILM COATED ORAL at 16:23

## 2018-02-24 RX ADMIN — ACETAMINOPHEN 650 MG: 325 TABLET, FILM COATED ORAL at 20:07

## 2018-02-24 RX ADMIN — NYSTATIN 500000 UNITS: 100000 SUSPENSION ORAL at 12:41

## 2018-02-24 RX ADMIN — NYSTATIN 500000 UNITS: 100000 SUSPENSION ORAL at 20:06

## 2018-02-24 RX ADMIN — NYSTATIN 500000 UNITS: 100000 SUSPENSION ORAL at 16:24

## 2018-02-24 RX ADMIN — Medication 75 MG: at 18:06

## 2018-02-24 RX ADMIN — ENOXAPARIN SODIUM 40 MG: 40 INJECTION SUBCUTANEOUS at 16:24

## 2018-02-24 RX ADMIN — PREDNISONE 20 MG: 20 TABLET ORAL at 08:27

## 2018-02-24 RX ADMIN — TACROLIMUS 4 MG: 1 CAPSULE ORAL at 18:06

## 2018-02-24 RX ADMIN — TACROLIMUS 4 MG: 1 CAPSULE ORAL at 08:27

## 2018-02-24 RX ADMIN — ASPIRIN 81 MG: 81 TABLET, COATED ORAL at 08:27

## 2018-02-24 RX ADMIN — Medication 2 G: at 08:36

## 2018-02-24 RX ADMIN — Medication 75 MG: at 08:26

## 2018-02-24 RX ADMIN — ACETAMINOPHEN 650 MG: 325 TABLET, FILM COATED ORAL at 08:30

## 2018-02-24 RX ADMIN — NYSTATIN 500000 UNITS: 100000 SUSPENSION ORAL at 08:27

## 2018-02-24 ASSESSMENT — ACTIVITIES OF DAILY LIVING (ADL)
ADLS_ACUITY_SCORE: 9

## 2018-02-24 ASSESSMENT — PAIN DESCRIPTION - DESCRIPTORS: DESCRIPTORS: HEADACHE

## 2018-02-24 NOTE — PLAN OF CARE
Problem: Patient Care Overview  Goal: Plan of Care/Patient Progress Review  Discharge Planner OT   Patient plan for discharge: Discharge to [facility] when medically appropriate.  Current status: Evaluation completed and treatment initiated. Therapist educated pt on OT/MD role and discussed POC/Goals for therapy. Educated pt on process of MD and eventual OP MD. Pt transitioned to BiPAP at 100% FiO2 for activity. Pt completed 4 bouts of 30 seconds marching in place with CGA. Pt required long rest breaks after each set. Pt maintained O2 >90% throughout on BiPAP, mostly >92%. VSS.    Barriers to return to prior living situation: Decreased activity tolerance, increased O2 demands, Fatigue, SOB.   Recommendations for discharge: Anticipated prolonged hospital stay until transplant. Will likely discharge to [facility] when medically appropriate with OP MD.   Rationale for recommendations: Pt will benefit from OP MD when medically appropriate to address barriers above.        Entered by: Manas Boo 02/24/2018 2:33 PM

## 2018-02-24 NOTE — PLAN OF CARE
Problem: Patient Care Overview  Goal: Plan of Care/Patient Progress Review  Outcome: No Change  VSS. O2 alternating between HFNC % when she's awake and Bipap when she's napping at 70% FIO2 sating 90-92%. Has frequent non productive cough, declined Tessalon cough med, did take tylenol x2 for mild headache pain with relief. Worked with OT this afternoon. Up to BSC to void with 1 assist. Had 1 Bm. Mag replaced.

## 2018-02-24 NOTE — PROGRESS NOTES
Internal Medicine [facility] Progress Note   Date of Service: 2/22/2018    Patient: Kecia Blue  MRN: [id number]  Admission Date: 2/21/2018  Hospital Day # 3    Assessment & Plan:     Kecia Blue is a 57 year old female with history of dermatomyositis, seronegative RA, ILD, and pulmonary hypertension who was admitted from cath lab for increasing shortness of breath and hypoxia, and is now listed for transplant.    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 No evidence of acute infection, however given immunocompromised status, infectious workup initiated and unrevealing. PE is on differential given sedentary lifestyle most recently, hypoxia, and tachypnea, but at this point progression of disease seems much more likely. No history of L heart failure, and cath with clean coronaries and normal left-sided pressures. She does have severe pHTN, but no evidence of volume overload on exam. Transplant workup now completed. ENT eval-ed, no abnormality seen on laryngoscopy. Rheumatology eval-ed, pt w/ good control of underlying rheumatologic disease, additional diagnostic tests ordered that will not impact transplant status. CT-chest 2/22/18 w/ pneumomediastinum, per thoracic surgery ok to observe.   -- Pulm consult, appreciate involvement   -- Supplemental O2, wean as able; HFNC w/ goal SaO2 88-92%   -- Continue increased prednisone dose of 20mg qday, no higher due to transplant workup  -- Continue azathioprine, tacrolimus at current doses  -- Continue Bactrim and Nystatin  -- Pulmonary rehab consult  -- Remain in hospital until transplant    # Elevated creatinine, resolved  Trended up on presentation in setting of poor PO intake. BUN:Cr  "consistent with prerenal. Resolved with IVF bolus and improved PO.   -- Trend creatinine  -- Bolus prn if creatinine bumps or looks dry on exam     ===Chronic conditions===  # Rheumatoid arthritis/dermatomyositis: Continue immunosuppressive meds as above. Rheumatology consulted.      FEN: Bolus prn  Lytes replacement protocol  Regular diet (NPO if on BiPAP)  Prophylaxis:  DVT: Lovenox   GI: None  Disposition: Inpatient until desired lung transplant.   Code Status: FULL CODE       Patient was seen and discussed with Dr. Montenegro who agrees with the above.    Kiersten Eli MD  Internal Medicine PGY-2  P: [phone]    ___________________________________________________________________    Subjective & Interval Hx:    Nursing notes reviewed, no acute events overnight. Did not sleep well last night, but after putting BiPAP on early this am, was able to get a few hours of sleep. SaO2 has been bumping around this morning on high-flow. Denies change in cough, fever/chills. Maybe a little more short of breath this morning, but otherwise no major changes.     ROS: The remainder of a 6 point ROS is negative unless otherwise noted above.    Medications: Reviewed in EPIC. List below for reference    Physical Exam:    Blood pressure (!) 149/99, pulse 88, temperature 97.5  F (36.4  C), temperature source Axillary, resp. rate 24, height 1.6 m (5' 3\"), weight 50.3 kg (110 lb 12.8 oz), SpO2 99 %.    General: Thin woman in no acute distress, sitting up in bed  HEENT: HFNC in place, sclera anicteric, MMM  Neck: no JVD  Chest/Resp: Diffuse bilateral fine crackles, no coarse crackles, no wheezes, no rhonchi. Tachypneic. Slightly increased work of breathing. Speaking in complete sentences.   Heart/CV: RRR, no m/r/g  Abdomen/GI: NABS, soft, nondistended, nontender  Extremities/MSK: No edema, cyanosis of bilateral hands and feet, extremities cool  Skin: No concerning rashes/lesions  Neuro/Psych: AOx3, CN's grossly intact, symmetric strength, " nonfocal exam. Mood is good, affect remains cheerful and bright.     Labs & Studies of Note:   141  107    20   /  --------------------- 87   4.2    28   0.84 \  Mg 1.8  Ca 8.2              \ 13.4 /   13.3 --------- 339    Micro:   Bcx x2 2/21/18: NGTD  UA 2/21/18: negative for signs of infection  Sputum cx: unable to obtain, cough not productive  Influenza A/B & RSV PCR: negative  UA 2/23/18: negative for signs of infection, no sediment    Imaging:  CT-chest 2/22/2018:   IMPRESSION:   1. Unchanged findings consistent with known interstitial lung disease.  Findings are inconsistent with UIP pattern. Differential includes  NSIP.  2. Pneumomediastinum extending into the subcutaneous soft tissue of  the left neck. These findings may be seen with interstitial lung  disease, although differential includes infection and barotrauma.  Correlate with any recent procedures.    Medications list for Reference   Current Facility-Administered Medications   Medication     magnesium sulfate 2 g in NS intermittent infusion ([facility] or [facility])     magnesium sulfate 4 g in 100 mL sterile water (premade)     benzonatate (TESSALON) capsule 100 mg     azaTHIOprine (IMURAN) half-tab 75 mg     lidocaine 1 % 1 mL     lidocaine (LMX4) kit     sodium chloride (PF) 0.9% PF flush 3 mL     sodium chloride (PF) 0.9% PF flush 3 mL     HOLD:  Metformin and metformin containing medications if patient received IV contrast     naloxone (NARCAN) injection 0.1-0.4 mg     nystatin (MYCOSTATIN) suspension 500,000 Units     sulfamethoxazole-trimethoprim (BACTRIM DS/SEPTRA DS) 800-160 MG per tablet 1 tablet     tacrolimus (GENERIC EQUIVALENT) capsule 4 mg     melatonin tablet 1 mg     sodium chloride (PF) 0.9% PF flush 3 mL     sodium chloride (PF) 0.9% PF flush 3 mL     enoxaparin (LOVENOX) injection 40 mg     potassium chloride SA (K-DUR/KLOR-CON M) CR tablet 20-40 mEq     potassium chloride (KLOR-CON) Packet 20-40 mEq     potassium chloride 10  mEq in 100 mL sterile water intermittent infusion (premix)     potassium chloride 10 mEq in 100 mL intermittent infusion with 10 mg lidocaine     potassium chloride 20 mEq in 50 mL intermittent infusion     acetaminophen (TYLENOL) tablet 650 mg     senna-docusate (SENOKOT-S;PERICOLACE) 8.6-50 MG per tablet 1 tablet    Or     senna-docusate (SENOKOT-S;PERICOLACE) 8.6-50 MG per tablet 2 tablet     polyethylene glycol (MIRALAX/GLYCOLAX) Packet 17 g     ondansetron (ZOFRAN-ODT) ODT tab 4 mg    Or     ondansetron (ZOFRAN) injection 4 mg     prochlorperazine (COMPAZINE) injection 10 mg    Or     prochlorperazine (COMPAZINE) tablet 10 mg    Or     prochlorperazine (COMPAZINE) Suppository 25 mg     No lozenges or gum should be given while patient on BIPAP/AVAPS/AVAPS AE     hypromellose-dextran (ARTIFICAL TEARS) ophthalmic solution 1 drop     Patient may continue current oral medications     predniSONE (DELTASONE) tablet 20 mg     aspirin EC EC tablet 81 mg

## 2018-02-24 NOTE — PROGRESS NOTES
CLOSURE VISIT    Met with Kecia and Ranjan now that evaluation has been completed and she has been listed for lung transplant. Reviewed lab and imaging results. Noted positive CMV status.  Also discussed positive Hepatitis A antibody--Kecia is not aware of having Hepatitis A or being vaccinated. Hep B is negative, but will defer vaccinations for now.    Reviewed PRA results which showed no UNOS unacceptable antigens, has a few moderate risk antibodies.     Sputum culture:  Unable to obtain  FIT test:  Not obtained outpatient, normal colonoscopy in 2012.  Diabetic status: not diabetic    Pt functional status: Karnofsky 30%.  (Patient status report updated)  Colonoscopy: 2012.   Mammogram: December 2017, normal  PAP: 11/29/16 NIL  Dental: braces removed October, plan was for a couple implants to retain spacing, deferred for now, cleaning completed  Immunizations: Will defer Hep B series    Oxygen use: 100% HFNC  Prednisone use: currently 20 mg daily  Ventilation status: intermittent bipap  Prior chest surgery?: no, bronch only  Need for carotid/LE ultrasounds?: no    ETOH intake: rare    Pulmonary rehab: unable  My Chart: not accessed                   Care Everywhere: open, records available     OTC Medications/Herbal Supplements: no  Did not  collect information form for donor family at time of donation/transplant from patient.

## 2018-02-24 NOTE — PLAN OF CARE
Problem: Patient Care Overview  Goal: Plan of Care/Patient Progress Review  Outcome: No Change  Shift: 5426-3255    Neuro: A&Ox4.   Cardiac: SR-tach to 100's most of shift but did have 13 beats of A-fib at 11:26am and per tele call, notified MD (see provider notification), pt asymptomatic during this; replaced Mag with 2g (rechek 2/24 am).   Respiratory: Lung Listed today! Goal Oxygen sats 88%-92%, sating within goal on HFNC at 90% FiO2 while at rest, desat's quickly with any exertion, used BiPAP at 100% FiO2 to maintain sats for any exertion (needed during bed bath/shampoo and up to commode today). Sputum sample still needed.  GI/: Adequate urine output, UA sent. BM x0  Diet/appetite: Tolerating regular diet. Eating well.  Activity:  Assist of 1 to commode.  Pain: Denied cara this shift. At acceptable level on current regimen.   Skin: Intact, no new deficits noted.  LDA's: Left PIV with LR @TKO (LR 1L bolus infused earlier, over 4hr per orders).     Plan: Continue with POC. Notify primary team with changes.

## 2018-02-24 NOTE — PLAN OF CARE
Problem: Patient Care Overview  Goal: Plan of Care/Patient Progress Review  Outcome: No Change  Neuro: A&Ox4.   Cardiac: SR-Sinus tach, HR 's. VSS.   Respiratory: Sating >90% on % HFNC, pt on bipap 100% with activity. Pt requested to keep bipap on while sleeping.    GI/: Adequate urine output. No BM this shift.   Diet/appetite: Tolerating regular diet.  Activity:  Assist of 1 when out of bed. Independently repositions.   Pain: Denies  Skin: Intact, no new deficits noted. Cold/dusky/cyanotic toes/fingers per pts baseline.   LDA's: L PIV, TKO.     Plan: Unable to collect sputum sample. Continue with POC. Notify primary team with changes.      Problem: Chronic Respiratory Difficulty Comorbidity  Goal: Chronic Respiratory Difficulty  Patient comorbidity will be monitored for signs and symptoms of Respiratory Difficulty (Chronic) condition.  Problems will be absent, minimized or managed by discharge/transition of care.   Outcome: No Change  Pt remained on HFNC at 100% and Bipap 100% with activity.

## 2018-02-25 LAB — RNAP III IGG SERPL IA-ACNC: 9 UNITS (ref 0–19)

## 2018-02-25 PROCEDURE — 25000132 ZZH RX MED GY IP 250 OP 250 PS 637: Performed by: STUDENT IN AN ORGANIZED HEALTH CARE EDUCATION/TRAINING PROGRAM

## 2018-02-25 PROCEDURE — 25000125 ZZHC RX 250: Performed by: STUDENT IN AN ORGANIZED HEALTH CARE EDUCATION/TRAINING PROGRAM

## 2018-02-25 PROCEDURE — 25000131 ZZH RX MED GY IP 250 OP 636 PS 637: Performed by: STUDENT IN AN ORGANIZED HEALTH CARE EDUCATION/TRAINING PROGRAM

## 2018-02-25 PROCEDURE — 25000128 H RX IP 250 OP 636: Performed by: STUDENT IN AN ORGANIZED HEALTH CARE EDUCATION/TRAINING PROGRAM

## 2018-02-25 PROCEDURE — 40000275 ZZH STATISTIC RCP TIME EA 10 MIN

## 2018-02-25 PROCEDURE — 94660 CPAP INITIATION&MGMT: CPT

## 2018-02-25 PROCEDURE — 12000006 ZZH R&B IMCU INTERMEDIATE UMMC

## 2018-02-25 PROCEDURE — 99233 SBSQ HOSP IP/OBS HIGH 50: CPT | Mod: GC | Performed by: INTERNAL MEDICINE

## 2018-02-25 RX ADMIN — BENZONATATE 100 MG: 100 CAPSULE, LIQUID FILLED ORAL at 09:09

## 2018-02-25 RX ADMIN — ACETAMINOPHEN 650 MG: 325 TABLET, FILM COATED ORAL at 19:55

## 2018-02-25 RX ADMIN — TACROLIMUS 4 MG: 1 CAPSULE ORAL at 18:53

## 2018-02-25 RX ADMIN — PREDNISONE 20 MG: 20 TABLET ORAL at 08:58

## 2018-02-25 RX ADMIN — ENOXAPARIN SODIUM 40 MG: 40 INJECTION SUBCUTANEOUS at 18:54

## 2018-02-25 RX ADMIN — TACROLIMUS 4 MG: 1 CAPSULE ORAL at 08:59

## 2018-02-25 RX ADMIN — ASPIRIN 81 MG: 81 TABLET, COATED ORAL at 08:58

## 2018-02-25 RX ADMIN — Medication 75 MG: at 18:52

## 2018-02-25 RX ADMIN — BENZONATATE 100 MG: 100 CAPSULE, LIQUID FILLED ORAL at 19:55

## 2018-02-25 RX ADMIN — Medication 75 MG: at 08:55

## 2018-02-25 RX ADMIN — Medication 2 G: at 09:10

## 2018-02-25 ASSESSMENT — ACTIVITIES OF DAILY LIVING (ADL)
ADLS_ACUITY_SCORE: 9

## 2018-02-25 NOTE — PROGRESS NOTES
Internal Medicine [facility] Progress Note   Date of Service: 2/22/2018    Patient: Kecia Blue  MRN: [id number]  Admission Date: 2/21/2018  Hospital Day # 4    Assessment & Plan:     Kecia Blue is a 57 year old female with history of dermatomyositis, seronegative RA, ILD, and pulmonary hypertension who was admitted from cath lab for increasing shortness of breath and hypoxia, and is now listed for transplant.    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 No evidence of acute infection, however given immunocompromised status, infectious workup initiated and unrevealing. PE is on differential given sedentary lifestyle most recently, hypoxia, and tachypnea, but at this point progression of disease seems much more likely. No history of L heart failure, and cath with clean coronaries and normal left-sided pressures. She does have severe pHTN, but no evidence of volume overload on exam. Transplant workup now completed. ENT eval-ed, no abnormality seen on laryngoscopy. Rheumatology eval-ed, pt w/ good control of underlying rheumatologic disease, additional diagnostic tests ordered that will not impact transplant status. CT-chest 2/22/18 w/ pneumomediastinum, per thoracic surgery ok to observe.   -- Pulm consult, appreciate involvement   -- Supplemental O2, wean as able; HFNC w/ goal SaO2 88-92%   -- Continue increased prednisone dose of 20mg qday, no higher due to transplant workup  -- Continue azathioprine, tacrolimus at current doses  -- Continue Bactrim and Nystatin  -- Pulmonary rehab consult  -- Await transplant    # Elevated creatinine, resolved  Trended up on presentation in setting of poor PO intake. BUN:Cr consistent with prerenal.  "Resolved with IVF bolus and improved PO.   -- Trend creatinine qod  -- Bolus prn if creatinine bumps or looks dry on exam     ===Chronic conditions===  # Rheumatoid arthritis/dermatomyositis: Continue immunosuppressive meds as above. Rheumatology consulted.      FEN: Bolus prn  Lytes replacement protocol  Regular diet (NPO if on BiPAP)  Prophylaxis:  DVT: Lovenox   GI: None  Disposition: Inpatient until desired lung transplant.   Code Status: FULL CODE       Patient was seen and discussed with Dr. Montenegro who agrees with the above.    Kiersten Eli MD  Internal Medicine PGY-2  P: [phone]    ___________________________________________________________________    Subjective & Interval Hx:    Nursing notes reviewed, no acute events overnight. Patient slept better last night. Remained on BiPAP overnight on lower FiO2. Feels well this morning. No new complaints.     ROS: The remainder of a 6 point ROS is negative unless otherwise noted above.    Medications: Reviewed in EPIC. List below for reference    Physical Exam:    Blood pressure 126/84, pulse 88, temperature 98.3  F (36.8  C), temperature source Axillary, resp. rate 26, height 1.6 m (5' 3\"), weight 51.7 kg (114 lb), SpO2 (!) 89 %.    General: Thin woman in no acute distress, sitting up in bed, BiPAP mask in place  HEENT: Sclera anicteric, MMM  Chest/Resp: Diffuse bilateral fine crackles, no coarse crackles, no wheezes, no rhonchi. Tachypneic. Slightly increased work of breathing. Speaking in complete sentences. BiPAP in place  Heart/CV: RRR, no m/r/g  Extremities/MSK: No edema, cyanosis of bilateral hands and feet, extremities cool  Skin: No concerning rashes/lesions  Neuro/Psych: AOx3, CN's grossly intact, symmetric strength, nonfocal exam. Mood is good, affect remains cheerful and bright.     Labs & Studies of Note:   No new labs today    Micro:   Bcx x2 2/21/18: NGTD  UA 2/21/18: negative for signs of infection  Sputum cx: unable to obtain, cough not " productive  Influenza A/B & RSV PCR: negative  UA 2/23/18: negative for signs of infection, no sediment    Imaging:  CT-chest 2/22/2018:   IMPRESSION:   1. Unchanged findings consistent with known interstitial lung disease.  Findings are inconsistent with UIP pattern. Differential includes  NSIP.  2. Pneumomediastinum extending into the subcutaneous soft tissue of  the left neck. These findings may be seen with interstitial lung  disease, although differential includes infection and barotrauma.  Correlate with any recent procedures.    Medications list for Reference   Current Facility-Administered Medications   Medication     magnesium sulfate 2 g in NS intermittent infusion ([facility] or [facility])     magnesium sulfate 4 g in 100 mL sterile water (premade)     benzonatate (TESSALON) capsule 100 mg     azaTHIOprine (IMURAN) half-tab 75 mg     lidocaine 1 % 1 mL     lidocaine (LMX4) kit     sodium chloride (PF) 0.9% PF flush 3 mL     sodium chloride (PF) 0.9% PF flush 3 mL     HOLD:  Metformin and metformin containing medications if patient received IV contrast     naloxone (NARCAN) injection 0.1-0.4 mg     nystatin (MYCOSTATIN) suspension 500,000 Units     sulfamethoxazole-trimethoprim (BACTRIM DS/SEPTRA DS) 800-160 MG per tablet 1 tablet     tacrolimus (GENERIC EQUIVALENT) capsule 4 mg     melatonin tablet 1 mg     sodium chloride (PF) 0.9% PF flush 3 mL     sodium chloride (PF) 0.9% PF flush 3 mL     enoxaparin (LOVENOX) injection 40 mg     potassium chloride SA (K-DUR/KLOR-CON M) CR tablet 20-40 mEq     potassium chloride (KLOR-CON) Packet 20-40 mEq     potassium chloride 10 mEq in 100 mL sterile water intermittent infusion (premix)     potassium chloride 10 mEq in 100 mL intermittent infusion with 10 mg lidocaine     potassium chloride 20 mEq in 50 mL intermittent infusion     acetaminophen (TYLENOL) tablet 650 mg     senna-docusate (SENOKOT-S;PERICOLACE) 8.6-50 MG per tablet 1 tablet    Or     senna-docusate  (SENOKOT-S;PERICOLACE) 8.6-50 MG per tablet 2 tablet     polyethylene glycol (MIRALAX/GLYCOLAX) Packet 17 g     ondansetron (ZOFRAN-ODT) ODT tab 4 mg    Or     ondansetron (ZOFRAN) injection 4 mg     prochlorperazine (COMPAZINE) injection 10 mg    Or     prochlorperazine (COMPAZINE) tablet 10 mg    Or     prochlorperazine (COMPAZINE) Suppository 25 mg     No lozenges or gum should be given while patient on BIPAP/AVAPS/AVAPS AE     hypromellose-dextran (ARTIFICAL TEARS) ophthalmic solution 1 drop     Patient may continue current oral medications     predniSONE (DELTASONE) tablet 20 mg     aspirin EC EC tablet 81 mg

## 2018-02-25 NOTE — PLAN OF CARE
"Problem: Patient Care Overview  Goal: Plan of Care/Patient Progress Review  Outcome: No Change  Neuro: A&Ox4.   Cardiac: SR, HR 80-90s. VSS.             Respiratory: Sating 88%-92% on bipap 65-70%.    Diet/appetite: Tolerating regular diet. Good appetite.   Activity:  Assist of 1 when out of bed. Independently repositions.   Pain: Denies. PRN tylenol x1 for mild headache.   Skin: Intact, no new deficits noted. Cold/dusky toes/fingers per pts baseline.   LDA's: L PIV, TKO.      Plan: Pt slept comfortably on Bipap. Unable to collect sputum sample. Continue with POC. Notify primary team with changes.  BP (!) 127/101 (BP Location: Left arm)  Pulse 88  Temp 96.9  F (36.1  C) (Axillary)  Resp 25  Ht 1.6 m (5' 3\")  Wt 50.3 kg (110 lb 12.8 oz)  SpO2 90%  BMI 19.63 kg/m2      Problem: Chronic Respiratory Difficulty Comorbidity  Goal: Chronic Respiratory Difficulty  Patient comorbidity will be monitored for signs and symptoms of Respiratory Difficulty (Chronic) condition.  Problems will be absent, minimized or managed by discharge/transition of care.   Outcome: No Change  Remained on Bipap all night at 65-70% FiO2, sats remained at goal of 88-92%      "

## 2018-02-26 ENCOUNTER — APPOINTMENT (OUTPATIENT)
Dept: OCCUPATIONAL THERAPY | Facility: CLINIC | Age: 58
DRG: 003 | End: 2018-02-26
Attending: INTERNAL MEDICINE
Payer: COMMERCIAL

## 2018-02-26 LAB
ANION GAP SERPL CALCULATED.3IONS-SCNC: 5 MMOL/L (ref 3–14)
BUN SERPL-MCNC: 25 MG/DL (ref 7–30)
CALCIUM SERPL-MCNC: 8.5 MG/DL (ref 8.5–10.1)
CHLORIDE SERPL-SCNC: 108 MMOL/L (ref 94–109)
CO2 SERPL-SCNC: 29 MMOL/L (ref 20–32)
COTININE SERPL-MCNC: NORMAL NG/ML
CREAT SERPL-MCNC: 0.93 MG/DL (ref 0.52–1.04)
ERYTHROCYTE [DISTWIDTH] IN BLOOD BY AUTOMATED COUNT: 15.2 % (ref 10–15)
GFR SERPL CREATININE-BSD FRML MDRD: 62 ML/MIN/1.7M2
GLUCOSE SERPL-MCNC: 76 MG/DL (ref 70–99)
HCT VFR BLD AUTO: 41.5 % (ref 35–47)
HGB BLD-MCNC: 13 G/DL (ref 11.7–15.7)
MCH RBC QN AUTO: 33.2 PG (ref 26.5–33)
MCHC RBC AUTO-ENTMCNC: 31.3 G/DL (ref 31.5–36.5)
MCV RBC AUTO: 106 FL (ref 78–100)
NICOTINE SERPL-MCNC: NORMAL NG/ML
PLATELET # BLD AUTO: 305 10E9/L (ref 150–450)
POTASSIUM SERPL-SCNC: 4.4 MMOL/L (ref 3.4–5.3)
RBC # BLD AUTO: 3.91 10E12/L (ref 3.8–5.2)
SODIUM SERPL-SCNC: 142 MMOL/L (ref 133–144)
WBC # BLD AUTO: 12.2 10E9/L (ref 4–11)

## 2018-02-26 PROCEDURE — 97110 THERAPEUTIC EXERCISES: CPT | Mod: GO | Performed by: OCCUPATIONAL THERAPIST

## 2018-02-26 PROCEDURE — 25000131 ZZH RX MED GY IP 250 OP 636 PS 637: Performed by: STUDENT IN AN ORGANIZED HEALTH CARE EDUCATION/TRAINING PROGRAM

## 2018-02-26 PROCEDURE — 40000983 ZZH STATISTIC HFNC ADULT NON-CPAP

## 2018-02-26 PROCEDURE — 25000125 ZZHC RX 250: Performed by: STUDENT IN AN ORGANIZED HEALTH CARE EDUCATION/TRAINING PROGRAM

## 2018-02-26 PROCEDURE — 99233 SBSQ HOSP IP/OBS HIGH 50: CPT | Mod: GC | Performed by: INTERNAL MEDICINE

## 2018-02-26 PROCEDURE — 40000275 ZZH STATISTIC RCP TIME EA 10 MIN

## 2018-02-26 PROCEDURE — 25000132 ZZH RX MED GY IP 250 OP 250 PS 637: Performed by: STUDENT IN AN ORGANIZED HEALTH CARE EDUCATION/TRAINING PROGRAM

## 2018-02-26 PROCEDURE — 40000047 ZZH STATISTIC CTO2 CONT OXYGEN TECH TIME EA 90 MIN

## 2018-02-26 PROCEDURE — 97530 THERAPEUTIC ACTIVITIES: CPT | Mod: GO | Performed by: OCCUPATIONAL THERAPIST

## 2018-02-26 PROCEDURE — 12000006 ZZH R&B IMCU INTERMEDIATE UMMC

## 2018-02-26 PROCEDURE — 94660 CPAP INITIATION&MGMT: CPT

## 2018-02-26 PROCEDURE — 36415 COLL VENOUS BLD VENIPUNCTURE: CPT | Performed by: STUDENT IN AN ORGANIZED HEALTH CARE EDUCATION/TRAINING PROGRAM

## 2018-02-26 PROCEDURE — 25000128 H RX IP 250 OP 636: Performed by: STUDENT IN AN ORGANIZED HEALTH CARE EDUCATION/TRAINING PROGRAM

## 2018-02-26 PROCEDURE — 85027 COMPLETE CBC AUTOMATED: CPT | Performed by: STUDENT IN AN ORGANIZED HEALTH CARE EDUCATION/TRAINING PROGRAM

## 2018-02-26 PROCEDURE — 80048 BASIC METABOLIC PNL TOTAL CA: CPT | Performed by: STUDENT IN AN ORGANIZED HEALTH CARE EDUCATION/TRAINING PROGRAM

## 2018-02-26 PROCEDURE — 40000133 ZZH STATISTIC OT WARD VISIT: Performed by: OCCUPATIONAL THERAPIST

## 2018-02-26 RX ORDER — LIDOCAINE 40 MG/G
CREAM TOPICAL
Status: DISCONTINUED | OUTPATIENT
Start: 2018-02-26 | End: 2018-03-01

## 2018-02-26 RX ORDER — LORAZEPAM 2 MG/ML
0.5 INJECTION INTRAMUSCULAR EVERY 6 HOURS PRN
Status: DISCONTINUED | OUTPATIENT
Start: 2018-02-26 | End: 2018-02-27

## 2018-02-26 RX ADMIN — Medication 75 MG: at 08:06

## 2018-02-26 RX ADMIN — Medication 75 MG: at 18:46

## 2018-02-26 RX ADMIN — PREDNISONE 20 MG: 20 TABLET ORAL at 08:05

## 2018-02-26 RX ADMIN — ENOXAPARIN SODIUM 40 MG: 40 INJECTION SUBCUTANEOUS at 18:45

## 2018-02-26 RX ADMIN — BENZONATATE 100 MG: 100 CAPSULE, LIQUID FILLED ORAL at 08:05

## 2018-02-26 RX ADMIN — TACROLIMUS 4 MG: 1 CAPSULE ORAL at 08:05

## 2018-02-26 RX ADMIN — TACROLIMUS 4 MG: 1 CAPSULE ORAL at 18:45

## 2018-02-26 RX ADMIN — ASPIRIN 81 MG: 81 TABLET, COATED ORAL at 08:06

## 2018-02-26 RX ADMIN — SULFAMETHOXAZOLE AND TRIMETHOPRIM 1 TABLET: 800; 160 TABLET ORAL at 08:06

## 2018-02-26 ASSESSMENT — ACTIVITIES OF DAILY LIVING (ADL)
ADLS_ACUITY_SCORE: 9

## 2018-02-26 NOTE — PROGRESS NOTES
INTERNAL MEDICINE PROGRESS NOTE    Kecia Blue ([id number]) admitted on 2/21/2018 02/25/2018    Changes today:  - Ativan for mild anxiety due to dyspnea  - No other major changes. Continue current cares.   - Pending Lung Tx.     Assessment & Plan:    Kecia Blue is a 57 year old female with a history of dermatomyositis, seronegative RA, ILD and pulmonary hypertension who was admitted from the cath lab with worsening shortness of breath and hypoxia, now listed for lung transplant.     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 No s/s suggestive of an infection and infectious workup negative so far. Other considerations although less likely, include PE. No hx of L heart failure. Cath with clean coronaries and normal left-sided pressures. Has severe pHTN at baseline but no evidence of volume overload on exam. Transplant workup now completed. ENT eval-ed, no abnormality seen on laryngoscopy. Rheumatology eval-ed, pt w/ good control of underlying rheumatologic disease, additional diagnostic esperanza ordered that will impact transplant status. CT chest 2/22/2015 w/ pneumomediastinum which per thoracic surgery is appropriate for conservative management.   - Pulmonary consult. Appreciate involvement.   - Supplemental O2. Wean as able - HFNC w/ goal SaO2 88-92%  - Prednisone at 20mg qday. No higher dose due to transplant workup  - Continue Azathioprine, tacrolimus at current doses  - Continue Bactrim and Nystatin  - Pulmonary rehab consult  - Await transplant.     #Elevated creatinine, resolved  Trended up on presentation in setting of poor PO intake. BUN:Cr consistent with prerenal. Resolved with IVF bolus and improved PO.   - Trend Creatinine qod  - Bolus prn if creatinine increase    CHRONIC PROBLEMS  #Rheumatoid arthritis/dermatomyositis  - Continue  "Immunosuppresive meds as above. Rheumatology consulted and signed off now, no further testing or changes in therapy. Rheum signed off as of 02/26.     Medications held:   None  FEN: Lytes replacement protocol  Regular diet (NPO if on BIPAP)    Prophylaxis:   DVT: Lovenox  GI: None  Disposition: Inpatient till lung transplant    Code Status: FULL     Patient was  discussed with Staff, Dr. Montenegro who agrees with above assessment and plan.  ==================================================================  Subjective:  Rn notes reviewed. Seems comfortable and has no questions or concerns today. Slept well overnight.    Objective:  Most recent vital signs:  /88 (BP Location: Left arm)  Pulse 88  Temp 98.2  F (36.8  C) (Oral)  Resp 26  Ht 1.6 m (5' 3\")  Wt 51.7 kg (114 lb)  SpO2 92%  BMI 20.19 kg/m2  Temp:  [96.9  F (36.1  C)-98.3  F (36.8  C)] 98.2  F (36.8  C)  Heart Rate:  [] 95  Resp:  [25-28] 26  BP: (121-150)/() 123/88  FiO2 (%):  [65 %-100 %] 100 %  SpO2:  [88 %-99 %] 92 %  Wt Readings from Last 2 Encounters:   02/25/18 51.7 kg (114 lb)   02/20/18 51.5 kg (113 lb 9.6 oz)       Intake/Output Summary (Last 24 hours) at 02/25/18 2021  Last data filed at 02/25/18 2000   Gross per 24 hour   Intake              640 ml   Output              700 ml   Net              -60 ml       Physical exam:  General: Patient lying comfortably in bed, with BIPAP mask in place  HEENT: EOMI, PERRL, no scleral icterus or injection, no bruits appreciated, no JVD, MMM  Cardiac: RRR, no m/r/g appreciated.   Respiratory: Diffuse bilateral fine crackles throughout lung fields   Extremities: Dusky extremities with clubbing noted  Skin: No acute lesions appreciated  Neuro: AOx3, CN II-XII grossly intact    Labs (Past three days):  Reviewed    Imaging/procedure results:   Reviewed    "

## 2018-02-26 NOTE — PLAN OF CARE
Problem: Patient Care Overview  Goal: Plan of Care/Patient Progress Review  Outcome: No Change  Neuro: A&Ox4. Pleasant.  Cardiac: SR-ST. VSS.   Respiratory:  Upper lobes coarse. Patient swaps between Bipap and High flow NC throughout the day. Requires 100% Bipap with activity, weans to 75% w/ rest. Uses 100% Fio2 when using the high flow NC. Experienced several bouts of coughing throughout the morning and received PRN Tessalon to suppress cough.   GI/: Adequate urine output. BM X1  Diet/appetite: Tolerating regular diet, minimal appetite.   Activity:  Assist of 1, up to chair and commode.   Pain: Denies.  Skin: Intact, no new deficits noted.  LDA's: L PIV SL    Plan: Continue with POC. Notify primary team with changes.

## 2018-02-26 NOTE — PROGRESS NOTES
" 02/24/18 1300   Quick Adds   Type of Visit Initial Occupational Therapy Evaluation   Living Environment   Lives With spouse   Living Arrangements house   Home Accessibility bed and bath on same level;stairs (2 railings present);stairs to enter home;tub/shower is not walk in   Number of Stairs to Enter Home 3   Number of Stairs Within Home 0   Stair Railings at Home outside, present at both sides   Transportation Available family or friend will provide   Living Environment Comment Pt will be staying at [place].   Self-Care   Dominant Hand right   Usual Activity Tolerance fair   Current Activity Tolerance poor   Regular Exercise no   Equipment Currently Used at Home oxygen;shower chair   Functional Level Prior   Ambulation 0-->independent   Transferring 0-->independent   Toileting 0-->independent   Bathing 0-->independent   Dressing 0-->independent   Eating 0-->independent   Communication 0-->understands/communicates without difficulty   Swallowing 0-->swallows foods/liquids without difficulty   Cognition 0 - no cognition issues reported   Fall history within last six months no   Which of the above functional risks had a recent onset or change? none   General Information   Onset of Illness/Injury or Date of Surgery - Date 02/21/18   Referring Physician Kiersten Eli MD   Patient/Family Goals Statement Pt would like to increase stregnth/activity tolerance in prep for transplant.    Additional Occupational Profile Info/Pertinent History of Current Problem Per chart, \"Kecia Blue is a 57 year old female with a history of dermatomyositis, seronegative RA, ILD and pulmonary hypertension who was admitted from the cath lab with worsening shortness of breath and hypoxia, now listed for lung transplant. \"   Precautions/Limitations fall precautions;oxygen therapy device and L/min   Weight-Bearing Status - LUE full weight-bearing   Weight-Bearing Status - RUE full weight-bearing   Weight-Bearing Status - LLE full " "weight-bearing   Weight-Bearing Status - RLE full weight-bearing   Cognitive Status Examination   Orientation orientation to person, place and time;person;place   Visual Perception   Visual Perception No deficits were identified;Wears glasses   Sensory Examination   Sensory Quick Adds No deficits were identified   Pain Assessment   Patient Currently in Pain No   Range of Motion (ROM)   ROM Comment BUE AROM WFL.    Strength   Strength Comments BUE strength grossly 4/5 MMT. Pt demonstrates  strength WNL.    Mobility   Bed Mobility Comments CGA and vc's   Transfer Skills   Transfer Comments CGA and vc's.   Activities of Daily Living Analysis   Impairments Contributing to Impaired Activities of Daily Living strength decreased  (decreased activity tolerance.)   General Therapy Interventions   Planned Therapy Interventions ADL retraining;IADL retraining;ROM;strengthening;stretching;transfer training;home program guidelines;progressive activity/exercise   Clinical Impression   Criteria for Skilled Therapeutic Interventions Met yes, treatment indicated   OT Diagnosis Decreased stregnth and functional endurance for ADLs/transfers   Influenced by the following impairments Decreased strength/activity tolerance, increased O2 demands.   Assessment of Occupational Performance 1-3 Performance Deficits   Identified Performance Deficits Decreased strength and activity tolerance for ADLs/transfers.   Clinical Decision Making (Complexity) Low complexity   Therapy Frequency 5 times/wk   Predicted Duration of Therapy Intervention (days/wks) 3/3/2018   Anticipated Discharge Disposition Other (see comments)  (Discharge to local housing with A of  and OP [facility].)   Risks and Benefits of Treatment have been explained. Yes   Patient, Family & other staff in agreement with plan of care Yes   [facility] AM-PAC TM \"6 Clicks\"   2016, Trustees of [facility], under license to [employer].  All rights reserved.   6 Clicks " "Short Forms Daily Activity Inpatient Short Form   [facility] AM-PAC  \"6 Clicks\" Daily Activity Inpatient Short Form   1. Putting on and taking off regular lower body clothing? 4 - None   2. Bathing (including washing, rinsing, drying)? 3 - A Little   3. Toileting, which includes using toilet, bedpan or urinal? 3 - A Little   4. Putting on and taking off regular upper body clothing? 4 - None   5. Taking care of personal grooming such as brushing teeth? 4 - None   6. Eating meals? 4 - None   Daily Activity Raw Score (Score out of 24.Lower scores equate to lower levels of function) 22   Total Evaluation Time   Total Evaluation Time (Minutes) 10     "

## 2018-02-26 NOTE — PLAN OF CARE
Problem: Patient Care Overview  Goal: Plan of Care/Patient Progress Review  Outcome: No Change  Neuro: A&Ox4.   Cardiac: SR, HR 80-90s. VSS.             Respiratory: Sating 88%-92% on bipap 65-90%. Frequent non-productive cough, Tessalon x1.  Diet/appetite: Tolerating regular diet. Good appetite.   Activity:  Assist of 1 when out of bed. Independently repositions.   Pain: Denies. PRN tylenol x1 for mild headache.   Skin: Intact, no new deficits noted. Cold/dusky toes/fingers per pts baseline.   LDA's: L PIV, TKO.       Plan: Pt slept comfortably on Bipap. Continue with POC. Notify primary team with changes.    Problem: Chronic Respiratory Difficulty Comorbidity  Goal: Chronic Respiratory Difficulty  Patient comorbidity will be monitored for signs and symptoms of Respiratory Difficulty (Chronic) condition.  Problems will be absent, minimized or managed by discharge/transition of care.   Outcome: No Change  Maintaining goal of 88%-92% on bipap at 65-90%. Treating cough with tessalon.

## 2018-02-26 NOTE — PLAN OF CARE
Problem: Chronic Respiratory Difficulty Comorbidity  Goal: Chronic Respiratory Difficulty  Patient comorbidity will be monitored for signs and symptoms of Respiratory Difficulty (Chronic) condition.  Problems will be absent, minimized or managed by discharge/transition of care.   Outcome: No Change  Patient requiring Bipap or highflow NC at all times. 100% Bipap with activity, 75% with rest. 100% high flow NC . Sats fall when experiencing bouts of coughing.

## 2018-02-26 NOTE — PLAN OF CARE
Problem: Patient Care Overview  Goal: Plan of Care/Patient Progress Review  Discharge Planner OT   Patient plan for discharge: not stated  Current status: OT educated pt on BUE CV therex, including 10-30 secon reps of AROM BUE therex 1-3 reps throughous. Pt was able to follow therex w/ min A for proper posture and body alignment throughout. VSS w/ O2 in low 90s on 100% FiO2 BiPAP.   Barriers to return to prior living situation: medical status  Recommendations for discharge: TCU  Rationale for recommendations: to increase ind in ADLS/IADLS       Entered by: Jaymie Li 02/26/2018 3:04 PM

## 2018-02-26 NOTE — PROGRESS NOTES
Brief Rheumatology Note:  Reviewed her chart and labs and her recently sarcoidosis labs have all returned negative. Scl-70, centromere, RNA poly 3 all returned negative. Overall her Dermatomyositis appears clinically quiescent. At this point in time we do not recommend any further rheumatology testing or changes in her current therapy. Agree with her ongoing evaluation and management with pulmonology and optimization before hopeful lung transplantation. We will sign off, but please do not hesitate to contact us with any further questions.    Discussed with Dr. Nevarez who agrees with the above plan.    Humberto He  Rheumatology Fellow

## 2018-02-27 ENCOUNTER — APPOINTMENT (OUTPATIENT)
Dept: GENERAL RADIOLOGY | Facility: CLINIC | Age: 58
DRG: 003 | End: 2018-02-27
Attending: STUDENT IN AN ORGANIZED HEALTH CARE EDUCATION/TRAINING PROGRAM
Payer: COMMERCIAL

## 2018-02-27 ENCOUNTER — ANESTHESIA EVENT (OUTPATIENT)
Dept: SURGERY | Facility: CLINIC | Age: 58
DRG: 003 | End: 2018-02-27
Payer: COMMERCIAL

## 2018-02-27 ENCOUNTER — ANESTHESIA (OUTPATIENT)
Dept: SURGERY | Facility: CLINIC | Age: 58
DRG: 003 | End: 2018-02-27
Payer: COMMERCIAL

## 2018-02-27 ENCOUNTER — APPOINTMENT (OUTPATIENT)
Dept: GENERAL RADIOLOGY | Facility: CLINIC | Age: 58
DRG: 003 | End: 2018-02-27
Attending: INTERNAL MEDICINE
Payer: COMMERCIAL

## 2018-02-27 ENCOUNTER — APPOINTMENT (OUTPATIENT)
Dept: CARDIOLOGY | Facility: CLINIC | Age: 58
DRG: 003 | End: 2018-02-27
Attending: INTERNAL MEDICINE
Payer: COMMERCIAL

## 2018-02-27 PROBLEM — J84.9 ILD (INTERSTITIAL LUNG DISEASE) (H): Status: ACTIVE | Noted: 2018-02-27

## 2018-02-27 LAB
BACTERIA SPEC CULT: NO GROWTH
BACTERIA SPEC CULT: NO GROWTH
BASE DEFICIT BLDA-SCNC: 0.9 MMOL/L
BASE DEFICIT BLDA-SCNC: 1.2 MMOL/L
BASE DEFICIT BLDA-SCNC: 1.5 MMOL/L
BASE DEFICIT BLDA-SCNC: 1.7 MMOL/L
BASE DEFICIT BLDV-SCNC: 0.5 MMOL/L
BASE EXCESS BLDA CALC-SCNC: 3.1 MMOL/L
CREAT SERPL-MCNC: 0.95 MG/DL (ref 0.52–1.04)
CREAT SERPL-MCNC: 0.97 MG/DL (ref 0.52–1.04)
ERYTHROCYTE [DISTWIDTH] IN BLOOD BY AUTOMATED COUNT: 14.9 % (ref 10–15)
FIO2-PRE: 21 %
GFR SERPL CREATININE-BSD FRML MDRD: 60 ML/MIN/1.7M2
GFR SERPL CREATININE-BSD FRML MDRD: 61 ML/MIN/1.7M2
GLUCOSE BLDC GLUCOMTR-MCNC: 104 MG/DL (ref 70–99)
GLUCOSE BLDC GLUCOMTR-MCNC: 163 MG/DL (ref 70–99)
GLUCOSE BLDC GLUCOMTR-MCNC: 168 MG/DL (ref 70–99)
HCO3 BLD-SCNC: 23 MMOL/L (ref 21–28)
HCO3 BLD-SCNC: 24 MMOL/L (ref 21–28)
HCO3 BLD-SCNC: 25 MMOL/L (ref 21–28)
HCO3 BLD-SCNC: 26 MMOL/L (ref 21–28)
HCO3 BLD-SCNC: 28 MMOL/L (ref 21–28)
HCO3 BLDV-SCNC: 28 MMOL/L (ref 21–28)
HCT VFR BLD AUTO: 34.5 % (ref 35–47)
HGB BLD-MCNC: 11 G/DL (ref 11.7–15.7)
MCH RBC QN AUTO: 33.2 PG (ref 26.5–33)
MCHC RBC AUTO-ENTMCNC: 31.9 G/DL (ref 31.5–36.5)
MCV RBC AUTO: 104 FL (ref 78–100)
O2/TOTAL GAS SETTING VFR VENT: 100 %
O2/TOTAL GAS SETTING VFR VENT: 40 %
O2/TOTAL GAS SETTING VFR VENT: ABNORMAL %
O2/TOTAL GAS SETTING VFR VENT: ABNORMAL %
OXYHGB MFR BLD: 83 % (ref 92–100)
OXYHGB MFR BLD: 95 % (ref 92–100)
OXYHGB MFR BLD: 97 % (ref 92–100)
OXYHGB MFR BLDV: 51 %
PCO2 BLD: 37 MM HG (ref 35–45)
PCO2 BLD: 39 MM HG (ref 35–45)
PCO2 BLD: 41 MM HG (ref 35–45)
PCO2 BLD: 46 MM HG (ref 35–45)
PCO2 BLD: 51 MM HG (ref 35–45)
PCO2 BLDV: 61 MM HG (ref 40–50)
PH BLD: 7.31 PH (ref 7.35–7.45)
PH BLD: 7.34 PH (ref 7.35–7.45)
PH BLD: 7.38 PH (ref 7.35–7.45)
PH BLD: 7.41 PH (ref 7.35–7.45)
PH BLD: 7.43 PH (ref 7.35–7.45)
PH BLDV: 7.27 PH (ref 7.32–7.43)
PLATELET # BLD AUTO: 235 10E9/L (ref 150–450)
PLATELET # BLD AUTO: 349 10E9/L (ref 150–450)
PO2 BLD: 106 MM HG (ref 80–105)
PO2 BLD: 354 MM HG (ref 80–105)
PO2 BLD: 403 MM HG (ref 80–105)
PO2 BLD: 58 MM HG (ref 80–105)
PO2 BLD: 66 MM HG (ref 80–105)
PO2 BLDV: 34 MM HG (ref 25–47)
POTASSIUM BLD-SCNC: 4.5 MMOL/L (ref 3.4–5.3)
PROCALCITONIN SERPL-MCNC: <0.05 NG/ML
RBC # BLD AUTO: 3.31 10E12/L (ref 3.8–5.2)
SPECIMEN SOURCE: NORMAL
SPECIMEN SOURCE: NORMAL
WBC # BLD AUTO: 19.4 10E9/L (ref 4–11)

## 2018-02-27 PROCEDURE — 94002 VENT MGMT INPAT INIT DAY: CPT

## 2018-02-27 PROCEDURE — 27210794 ZZH OR GENERAL SUPPLY STERILE: Performed by: SURGERY

## 2018-02-27 PROCEDURE — 25000125 ZZHC RX 250: Performed by: INTERNAL MEDICINE

## 2018-02-27 PROCEDURE — 71045 X-RAY EXAM CHEST 1 VIEW: CPT

## 2018-02-27 PROCEDURE — 36556 INSERT NON-TUNNEL CV CATH: CPT | Mod: GC | Performed by: INTERNAL MEDICINE

## 2018-02-27 PROCEDURE — 87640 STAPH A DNA AMP PROBE: CPT | Performed by: STUDENT IN AN ORGANIZED HEALTH CARE EDUCATION/TRAINING PROGRAM

## 2018-02-27 PROCEDURE — 36000076 ZZH SURGERY LEVEL 6 EA 15 ADDTL MIN - UMMC: Performed by: SURGERY

## 2018-02-27 PROCEDURE — 20000004 ZZH R&B ICU UMMC

## 2018-02-27 PROCEDURE — 40000275 ZZH STATISTIC RCP TIME EA 10 MIN

## 2018-02-27 PROCEDURE — 27211040 ZZH CONTINUOUS NEBULIZER MICRO PUMP

## 2018-02-27 PROCEDURE — 80048 BASIC METABOLIC PNL TOTAL CA: CPT | Performed by: SURGERY

## 2018-02-27 PROCEDURE — 85027 COMPLETE CBC AUTOMATED: CPT | Performed by: SURGERY

## 2018-02-27 PROCEDURE — 36415 COLL VENOUS BLD VENIPUNCTURE: CPT | Performed by: STUDENT IN AN ORGANIZED HEALTH CARE EDUCATION/TRAINING PROGRAM

## 2018-02-27 PROCEDURE — 25000128 H RX IP 250 OP 636

## 2018-02-27 PROCEDURE — 25000128 H RX IP 250 OP 636: Performed by: INTERNAL MEDICINE

## 2018-02-27 PROCEDURE — 40000047 ZZH STATISTIC CTO2 CONT OXYGEN TECH TIME EA 90 MIN

## 2018-02-27 PROCEDURE — 84145 PROCALCITONIN (PCT): CPT | Performed by: STUDENT IN AN ORGANIZED HEALTH CARE EDUCATION/TRAINING PROGRAM

## 2018-02-27 PROCEDURE — 82565 ASSAY OF CREATININE: CPT | Performed by: INTERNAL MEDICINE

## 2018-02-27 PROCEDURE — 82803 BLOOD GASES ANY COMBINATION: CPT | Performed by: STUDENT IN AN ORGANIZED HEALTH CARE EDUCATION/TRAINING PROGRAM

## 2018-02-27 PROCEDURE — 40000986 XR CHEST PORT 1 VW

## 2018-02-27 PROCEDURE — 40000986 XR ABDOMEN PORT 1 VW

## 2018-02-27 PROCEDURE — 36620 INSERTION CATHETER ARTERY: CPT | Mod: GC | Performed by: INTERNAL MEDICINE

## 2018-02-27 PROCEDURE — 27210995 ZZH RX 272: Performed by: SURGERY

## 2018-02-27 PROCEDURE — 25000125 ZZHC RX 250: Performed by: STUDENT IN AN ORGANIZED HEALTH CARE EDUCATION/TRAINING PROGRAM

## 2018-02-27 PROCEDURE — 5A1955Z RESPIRATORY VENTILATION, GREATER THAN 96 CONSECUTIVE HOURS: ICD-10-PCS | Performed by: SURGERY

## 2018-02-27 PROCEDURE — 94660 CPAP INITIATION&MGMT: CPT

## 2018-02-27 PROCEDURE — 27810169 ZZH OR IMPLANT GENERAL: Performed by: SURGERY

## 2018-02-27 PROCEDURE — 27210136 ZZH KIT CATH ARTERIAL EXT SUPPLY

## 2018-02-27 PROCEDURE — 41000018 ZZH PER-PERFUSION 1ST 30 MIN: Performed by: SURGERY

## 2018-02-27 PROCEDURE — C1763 CONN TISS, NON-HUMAN: HCPCS | Performed by: SURGERY

## 2018-02-27 PROCEDURE — 40000986 XR ABDOMEN 1 VW

## 2018-02-27 PROCEDURE — 93010 ELECTROCARDIOGRAM REPORT: CPT | Performed by: INTERNAL MEDICINE

## 2018-02-27 PROCEDURE — 27210995 ZZH RX 272: Performed by: STUDENT IN AN ORGANIZED HEALTH CARE EDUCATION/TRAINING PROGRAM

## 2018-02-27 PROCEDURE — 5A15223 EXTRACORPOREAL MEMBRANE OXYGENATION, CONTINUOUS: ICD-10-PCS | Performed by: SURGERY

## 2018-02-27 PROCEDURE — 93306 TTE W/DOPPLER COMPLETE: CPT | Mod: 26 | Performed by: INTERNAL MEDICINE

## 2018-02-27 PROCEDURE — 86850 RBC ANTIBODY SCREEN: CPT | Performed by: INTERNAL MEDICINE

## 2018-02-27 PROCEDURE — 94644 CONT INHLJ TX 1ST HOUR: CPT

## 2018-02-27 PROCEDURE — 27211184 ZZH CARDIOHELP CIRCUIT

## 2018-02-27 PROCEDURE — 36620 INSERTION CATHETER ARTERY: CPT

## 2018-02-27 PROCEDURE — 82805 BLOOD GASES W/O2 SATURATION: CPT | Performed by: SURGERY

## 2018-02-27 PROCEDURE — 74018 RADEX ABDOMEN 1 VIEW: CPT

## 2018-02-27 PROCEDURE — 00000146 ZZHCL STATISTIC GLUCOSE BY METER IP

## 2018-02-27 PROCEDURE — 87633 RESP VIRUS 12-25 TARGETS: CPT | Performed by: STUDENT IN AN ORGANIZED HEALTH CARE EDUCATION/TRAINING PROGRAM

## 2018-02-27 PROCEDURE — 36000074 ZZH SURGERY LEVEL 6 1ST 30 MIN - UMMC: Performed by: SURGERY

## 2018-02-27 PROCEDURE — 40000983 ZZH STATISTIC HFNC ADULT NON-CPAP

## 2018-02-27 PROCEDURE — 25000128 H RX IP 250 OP 636: Performed by: NURSE ANESTHETIST, CERTIFIED REGISTERED

## 2018-02-27 PROCEDURE — 83036 HEMOGLOBIN GLYCOSYLATED A1C: CPT | Performed by: SURGERY

## 2018-02-27 PROCEDURE — 85347 COAGULATION TIME ACTIVATED: CPT

## 2018-02-27 PROCEDURE — 86900 BLOOD TYPING SEROLOGIC ABO: CPT | Performed by: INTERNAL MEDICINE

## 2018-02-27 PROCEDURE — 99233 SBSQ HOSP IP/OBS HIGH 50: CPT | Mod: GC | Performed by: PEDIATRICS

## 2018-02-27 PROCEDURE — 25000131 ZZH RX MED GY IP 250 OP 636 PS 637: Performed by: STUDENT IN AN ORGANIZED HEALTH CARE EDUCATION/TRAINING PROGRAM

## 2018-02-27 PROCEDURE — 94799 UNLISTED PULMONARY SVC/PX: CPT

## 2018-02-27 PROCEDURE — 85004 AUTOMATED DIFF WBC COUNT: CPT | Performed by: SURGERY

## 2018-02-27 PROCEDURE — 25000128 H RX IP 250 OP 636: Performed by: STUDENT IN AN ORGANIZED HEALTH CARE EDUCATION/TRAINING PROGRAM

## 2018-02-27 PROCEDURE — 27211329 ZZ H DEVICE OXYGENATOR QUADROX ECMO, ADULT

## 2018-02-27 PROCEDURE — 84132 ASSAY OF SERUM POTASSIUM: CPT | Performed by: STUDENT IN AN ORGANIZED HEALTH CARE EDUCATION/TRAINING PROGRAM

## 2018-02-27 PROCEDURE — 25000128 H RX IP 250 OP 636: Performed by: SURGERY

## 2018-02-27 PROCEDURE — 93306 TTE W/DOPPLER COMPLETE: CPT

## 2018-02-27 PROCEDURE — 40000281 ZZH STATISTIC TRANSPORT TIME EA 15 MIN

## 2018-02-27 PROCEDURE — 99291 CRITICAL CARE FIRST HOUR: CPT | Mod: 25 | Performed by: INTERNAL MEDICINE

## 2018-02-27 PROCEDURE — 82805 BLOOD GASES W/O2 SATURATION: CPT | Performed by: INTERNAL MEDICINE

## 2018-02-27 PROCEDURE — 27210468 ZZH SENSOR SOMATIC ADULT

## 2018-02-27 PROCEDURE — 85049 AUTOMATED PLATELET COUNT: CPT | Performed by: INTERNAL MEDICINE

## 2018-02-27 PROCEDURE — 27211335 ZZ H CANNULA, ARTERIAL PERIPHERAL, ADULT

## 2018-02-27 PROCEDURE — 94645 CONT INHLJ TX EACH ADDL HOUR: CPT

## 2018-02-27 PROCEDURE — 37000008 ZZH ANESTHESIA TECHNICAL FEE, 1ST 30 MIN: Performed by: SURGERY

## 2018-02-27 PROCEDURE — 94640 AIRWAY INHALATION TREATMENT: CPT

## 2018-02-27 PROCEDURE — 87641 MR-STAPH DNA AMP PROBE: CPT | Performed by: STUDENT IN AN ORGANIZED HEALTH CARE EDUCATION/TRAINING PROGRAM

## 2018-02-27 PROCEDURE — 86923 COMPATIBILITY TEST ELECTRIC: CPT | Performed by: INTERNAL MEDICINE

## 2018-02-27 PROCEDURE — 27211355

## 2018-02-27 PROCEDURE — 33947 ECMO/ECLS INITIATION ARTERY: CPT

## 2018-02-27 PROCEDURE — 36600 WITHDRAWAL OF ARTERIAL BLOOD: CPT

## 2018-02-27 PROCEDURE — 82565 ASSAY OF CREATININE: CPT | Performed by: STUDENT IN AN ORGANIZED HEALTH CARE EDUCATION/TRAINING PROGRAM

## 2018-02-27 PROCEDURE — 40000671 ZZH STATISTIC ANESTHESIA CASE

## 2018-02-27 PROCEDURE — 40000014 ZZH STATISTIC ARTERIAL MONITORING DAILY

## 2018-02-27 PROCEDURE — 94640 AIRWAY INHALATION TREATMENT: CPT | Mod: 76

## 2018-02-27 PROCEDURE — 86901 BLOOD TYPING SEROLOGIC RH(D): CPT | Performed by: INTERNAL MEDICINE

## 2018-02-27 PROCEDURE — 93005 ELECTROCARDIOGRAM TRACING: CPT

## 2018-02-27 DEVICE — GRAFT VASCUTEK GELWEAVE STRAIGHT 8MMX15CM 731508: Type: IMPLANTABLE DEVICE | Site: SUBCLAVIAN | Status: FUNCTIONAL

## 2018-02-27 DEVICE — GRAFT PERICARDIUM 6X8CM BOVINE E6P8: Type: IMPLANTABLE DEVICE | Status: FUNCTIONAL

## 2018-02-27 RX ORDER — VECURONIUM BROMIDE 1 MG/ML
10 INJECTION, POWDER, LYOPHILIZED, FOR SOLUTION INTRAVENOUS ONCE
Status: COMPLETED | OUTPATIENT
Start: 2018-02-27 | End: 2018-02-27

## 2018-02-27 RX ORDER — NALOXONE HYDROCHLORIDE 0.4 MG/ML
.1-.4 INJECTION, SOLUTION INTRAMUSCULAR; INTRAVENOUS; SUBCUTANEOUS
Status: DISCONTINUED | OUTPATIENT
Start: 2018-02-27 | End: 2018-02-27

## 2018-02-27 RX ORDER — PROPOFOL 10 MG/ML
INJECTION, EMULSION INTRAVENOUS PRN
Status: DISCONTINUED | OUTPATIENT
Start: 2018-02-27 | End: 2018-02-27

## 2018-02-27 RX ORDER — ONDANSETRON 4 MG/1
4 TABLET, ORALLY DISINTEGRATING ORAL EVERY 6 HOURS PRN
Status: DISCONTINUED | OUTPATIENT
Start: 2018-02-27 | End: 2018-02-27

## 2018-02-27 RX ORDER — DOBUTAMINE HYDROCHLORIDE 200 MG/100ML
2.5-5 INJECTION INTRAVENOUS CONTINUOUS
Status: DISCONTINUED | OUTPATIENT
Start: 2018-02-27 | End: 2018-03-01

## 2018-02-27 RX ORDER — IPRATROPIUM BROMIDE AND ALBUTEROL SULFATE 2.5; .5 MG/3ML; MG/3ML
3 SOLUTION RESPIRATORY (INHALATION)
Status: DISCONTINUED | OUTPATIENT
Start: 2018-02-27 | End: 2018-02-28

## 2018-02-27 RX ORDER — FENTANYL CITRATE 50 UG/ML
INJECTION, SOLUTION INTRAMUSCULAR; INTRAVENOUS
Status: COMPLETED
Start: 2018-02-27 | End: 2018-02-27

## 2018-02-27 RX ORDER — NICOTINE POLACRILEX 4 MG
15-30 LOZENGE BUCCAL
Status: DISCONTINUED | OUTPATIENT
Start: 2018-02-27 | End: 2018-03-01

## 2018-02-27 RX ORDER — SODIUM CHLORIDE, SODIUM LACTATE, POTASSIUM CHLORIDE, CALCIUM CHLORIDE 600; 310; 30; 20 MG/100ML; MG/100ML; MG/100ML; MG/100ML
INJECTION, SOLUTION INTRAVENOUS
Status: DISCONTINUED
Start: 2018-02-27 | End: 2018-02-27 | Stop reason: HOSPADM

## 2018-02-27 RX ORDER — SODIUM CHLORIDE 9 MG/ML
INJECTION, SOLUTION INTRAVENOUS
Status: COMPLETED
Start: 2018-02-27 | End: 2018-02-27

## 2018-02-27 RX ORDER — DEXTROSE MONOHYDRATE 25 G/50ML
25-50 INJECTION, SOLUTION INTRAVENOUS
Status: DISCONTINUED | OUTPATIENT
Start: 2018-02-27 | End: 2018-03-01

## 2018-02-27 RX ORDER — ASPIRIN 81 MG/1
81 TABLET, CHEWABLE ORAL DAILY
Status: DISCONTINUED | OUTPATIENT
Start: 2018-02-28 | End: 2018-02-27

## 2018-02-27 RX ORDER — AMOXICILLIN 250 MG
2 CAPSULE ORAL 2 TIMES DAILY PRN
Status: DISCONTINUED | OUTPATIENT
Start: 2018-02-27 | End: 2018-03-01

## 2018-02-27 RX ORDER — TACROLIMUS 1 MG/1
4 CAPSULE ORAL
Status: DISCONTINUED | OUTPATIENT
Start: 2018-02-27 | End: 2018-02-28

## 2018-02-27 RX ORDER — SODIUM CHLORIDE 9 MG/ML
INJECTION, SOLUTION INTRAVENOUS
Status: DISCONTINUED
Start: 2018-02-27 | End: 2018-02-27 | Stop reason: HOSPADM

## 2018-02-27 RX ORDER — AMOXICILLIN 250 MG
1 CAPSULE ORAL 2 TIMES DAILY PRN
Status: DISCONTINUED | OUTPATIENT
Start: 2018-02-27 | End: 2018-03-01

## 2018-02-27 RX ORDER — POLYETHYLENE GLYCOL 3350 17 G/17G
17 POWDER, FOR SOLUTION ORAL DAILY PRN
Status: DISCONTINUED | OUTPATIENT
Start: 2018-02-27 | End: 2018-03-01

## 2018-02-27 RX ORDER — VANCOMYCIN HYDROCHLORIDE 1 G/200ML
1000 INJECTION, SOLUTION INTRAVENOUS
Status: DISCONTINUED | OUTPATIENT
Start: 2018-02-28 | End: 2018-02-28

## 2018-02-27 RX ORDER — FENTANYL CITRATE 50 UG/ML
50 INJECTION, SOLUTION INTRAMUSCULAR; INTRAVENOUS
Status: DISCONTINUED | OUTPATIENT
Start: 2018-02-27 | End: 2018-03-01

## 2018-02-27 RX ORDER — HEPARIN SODIUM (PORCINE) LOCK FLUSH IV SOLN 100 UNIT/ML 100 UNIT/ML
5-10 SOLUTION INTRAVENOUS EVERY 30 MIN PRN
Status: DISCONTINUED | OUTPATIENT
Start: 2018-02-27 | End: 2018-03-01

## 2018-02-27 RX ORDER — MIDAZOLAM (PF) 1 MG/ML IN 0.9 % SODIUM CHLORIDE INTRAVENOUS SOLUTION
1-8 CONTINUOUS
Status: DISCONTINUED | OUTPATIENT
Start: 2018-02-27 | End: 2018-03-01

## 2018-02-27 RX ORDER — SULFAMETHOXAZOLE/TRIMETHOPRIM 800-160 MG
1 TABLET ORAL
Status: DISCONTINUED | OUTPATIENT
Start: 2018-02-28 | End: 2018-03-01

## 2018-02-27 RX ORDER — ONDANSETRON 2 MG/ML
4 INJECTION INTRAMUSCULAR; INTRAVENOUS EVERY 6 HOURS PRN
Status: DISCONTINUED | OUTPATIENT
Start: 2018-02-27 | End: 2018-02-27

## 2018-02-27 RX ORDER — FENTANYL CITRATE 50 UG/ML
100 INJECTION, SOLUTION INTRAMUSCULAR; INTRAVENOUS ONCE
Status: COMPLETED | OUTPATIENT
Start: 2018-02-27 | End: 2018-02-27

## 2018-02-27 RX ORDER — FAMOTIDINE 40 MG/5ML
20 POWDER, FOR SUSPENSION ORAL 2 TIMES DAILY
Status: DISCONTINUED | OUTPATIENT
Start: 2018-02-27 | End: 2018-02-28

## 2018-02-27 RX ORDER — NYSTATIN 100000/ML
500000 SUSPENSION, ORAL (FINAL DOSE FORM) ORAL 4 TIMES DAILY
Status: DISCONTINUED | OUTPATIENT
Start: 2018-02-27 | End: 2018-03-01

## 2018-02-27 RX ORDER — PIPERACILLIN SODIUM, TAZOBACTAM SODIUM 3; .375 G/15ML; G/15ML
3.38 INJECTION, POWDER, LYOPHILIZED, FOR SOLUTION INTRAVENOUS EVERY 6 HOURS
Status: DISCONTINUED | OUTPATIENT
Start: 2018-02-27 | End: 2018-03-01

## 2018-02-27 RX ORDER — MIDAZOLAM (PF) 1 MG/ML IN 0.9 % SODIUM CHLORIDE INTRAVENOUS SOLUTION
1-8 CONTINUOUS
Status: DISCONTINUED | OUTPATIENT
Start: 2018-02-27 | End: 2018-02-27

## 2018-02-27 RX ORDER — VECURONIUM BROMIDE 1 MG/ML
INJECTION, POWDER, LYOPHILIZED, FOR SOLUTION INTRAVENOUS
Status: COMPLETED
Start: 2018-02-27 | End: 2018-02-27

## 2018-02-27 RX ADMIN — IPRATROPIUM BROMIDE AND ALBUTEROL SULFATE 3 ML: .5; 3 SOLUTION RESPIRATORY (INHALATION) at 10:06

## 2018-02-27 RX ADMIN — VASOPRESSIN 2.4 UNITS/HR: 20 INJECTION, SOLUTION INTRAMUSCULAR; SUBCUTANEOUS at 15:50

## 2018-02-27 RX ADMIN — VECURONIUM BROMIDE 10 MG: 1 INJECTION, POWDER, LYOPHILIZED, FOR SOLUTION INTRAVENOUS at 13:50

## 2018-02-27 RX ADMIN — ROCURONIUM BROMIDE 50 MG: 10 INJECTION INTRAVENOUS at 12:50

## 2018-02-27 RX ADMIN — Medication 100 MCG/HR: at 23:01

## 2018-02-27 RX ADMIN — VECURONIUM BROMIDE 0.8 MCG/KG/MIN: 1 INJECTION, POWDER, LYOPHILIZED, FOR SOLUTION INTRAVENOUS at 14:51

## 2018-02-27 RX ADMIN — ENOXAPARIN SODIUM 40 MG: 40 INJECTION SUBCUTANEOUS at 17:48

## 2018-02-27 RX ADMIN — HYDROCORTISONE SODIUM SUCCINATE 50 MG: 100 INJECTION, POWDER, FOR SOLUTION INTRAMUSCULAR; INTRAVENOUS at 23:06

## 2018-02-27 RX ADMIN — FENTANYL CITRATE 50 MCG: 50 INJECTION, SOLUTION INTRAMUSCULAR; INTRAVENOUS at 14:08

## 2018-02-27 RX ADMIN — PHENYLEPHRINE HYDROCHLORIDE 100 MCG: 10 INJECTION, SOLUTION INTRAMUSCULAR; INTRAVENOUS; SUBCUTANEOUS at 12:53

## 2018-02-27 RX ADMIN — VECURONIUM BROMIDE 10 MG: 10 INJECTION, POWDER, LYOPHILIZED, FOR SOLUTION INTRAVENOUS at 13:50

## 2018-02-27 RX ADMIN — LORAZEPAM 0.5 MG: 2 INJECTION INTRAMUSCULAR; INTRAVENOUS at 06:47

## 2018-02-27 RX ADMIN — NOREPINEPHRINE BITARTRATE 0.05 MCG/KG/MIN: 1 INJECTION INTRAVENOUS at 14:10

## 2018-02-27 RX ADMIN — MIDAZOLAM 10 MG: 1 INJECTION INTRAMUSCULAR; INTRAVENOUS at 13:48

## 2018-02-27 RX ADMIN — Medication 6 MG/HR: at 15:15

## 2018-02-27 RX ADMIN — HYDROCORTISONE SODIUM SUCCINATE 50 MG: 100 INJECTION, POWDER, FOR SOLUTION INTRAMUSCULAR; INTRAVENOUS at 17:48

## 2018-02-27 RX ADMIN — PROPOFOL 100 MG: 10 INJECTION, EMULSION INTRAVENOUS at 12:50

## 2018-02-27 RX ADMIN — IPRATROPIUM BROMIDE AND ALBUTEROL SULFATE 3 ML: .5; 3 SOLUTION RESPIRATORY (INHALATION) at 15:19

## 2018-02-27 RX ADMIN — PHENYLEPHRINE HYDROCHLORIDE 200 MCG: 10 INJECTION, SOLUTION INTRAMUSCULAR; INTRAVENOUS; SUBCUTANEOUS at 12:56

## 2018-02-27 RX ADMIN — Medication 4 MG/HR: at 13:06

## 2018-02-27 RX ADMIN — EPOPROSTENOL 20 NG/KG/MIN: 1.5 INJECTION, POWDER, LYOPHILIZED, FOR SOLUTION INTRAVENOUS at 13:30

## 2018-02-27 RX ADMIN — EPOPROSTENOL 20 NG/KG/MIN: 1.5 INJECTION, POWDER, LYOPHILIZED, FOR SOLUTION INTRAVENOUS at 21:00

## 2018-02-27 RX ADMIN — SODIUM CHLORIDE 1000 ML: 9 INJECTION, SOLUTION INTRAVENOUS at 14:11

## 2018-02-27 RX ADMIN — Medication 100 MCG/HR: at 14:05

## 2018-02-27 RX ADMIN — MINERAL OIL AND WHITE PETROLATUM: 150; 830 OINTMENT OPHTHALMIC at 23:07

## 2018-02-27 RX ADMIN — MINERAL OIL AND WHITE PETROLATUM: 150; 830 OINTMENT OPHTHALMIC at 17:48

## 2018-02-27 ASSESSMENT — ACTIVITIES OF DAILY LIVING (ADL)
ADLS_ACUITY_SCORE: 9

## 2018-02-27 NOTE — PLAN OF CARE
Problem: Patient Care Overview  Goal: Plan of Care/Patient Progress Review  Outcome: Declining  Temp:  [98.1  F (36.7  C)-99.7  F (37.6  C)] 99.7  F (37.6  C)  Heart Rate:  [] 109  Resp:  [20-52] 23  BP: ()/() 95/57  FiO2 (%):  [85 %-100 %] 100 %  SpO2:  [79 %-97 %] 84 %  Shift 1184-3406  Neuro: A/Ox4  Cardiac: -110  Respiratory: 100% Bipap, saturating 87-93%. RR 30-45. Increased RR and decreased saturation from 11:30-12pm (87%, 40-50RR while on 100% Bipap). Pt transferred to  @1200.  GI/:  Adequate UO per bedpan.   Diet/appetite: unable to have intake with continuous Bipap needs. MD to change oral medications to IV.   Activity: unable to ambulate with current o2 requirements.   Pain: denies.   Skin: WOC entered for scabbing to bridge of nose.   Tests: respiratory viral panel, ABG, and chest xray.       Continue with POC. Notify primary team with changes.

## 2018-02-27 NOTE — PROGRESS NOTES
Transfer  Transferred to: 4A  Via:bed  Reason for transfer: worsened respiratory status.   Family: Aware of transfer,  and daughters at bedside.   Belongings: sentwith pt, glasses given to daughters.     Report given to: LOUISE Gambino   Pt status: 100% Bipap, RR 40-50, saturating 88-94%. RT and float RN at bedside.

## 2018-02-27 NOTE — PROCEDURES
"Procedure/Surgery Information   [facility], [place]    Bedside Procedure Note  Date of Service (when I performed the procedure): 02/27/2018    Kecia Blue is a 57 year old female patient.  1. Organ transplant candidate    2. Lung disease, interstitial (H)    3. Abnormal chest CT      Past Medical History:   Diagnosis Date     Antisynthetase syndrome (H) 2014     Chronic cough      Dermatomyositis (H)      Dysplasia of cervix, low grade (ESTRADA 1)      ILD (interstitial lung disease) (H)      Osteopenia      Pulmonary hypertension      Raynaud's disease      Seronegative rheumatoid arthritis (H)      Temp: 99.7  F (37.6  C) Temp src: Axillary BP: 95/54   Heart Rate: 110 Resp: 24 SpO2: 95 % O2 Device: BiPAP/CPAP      Insert arterial line  Date/Time: 2/27/2018 3:03 PM  Performed by: DAWIT CARO  Authorized by: DAWIT CARO   Consent: Verbal consent obtained. Written consent obtained.  Risks and benefits: risks, benefits and alternatives were discussed  Consent given by: spouse  Patient understanding: patient states understanding of the procedure being performed  Patient consent: the patient's understanding of the procedure matches consent given  Procedure consent: procedure consent matches procedure scheduled  Relevant documents: relevant documents present and verified  Patient identity confirmed: arm band  Time out: Immediately prior to procedure a \"time out\" was called to verify the correct patient, procedure, equipment, support staff and site/side marked as required.  Preparation: Patient was prepped and draped in the usual sterile fashion.  Indications: multiple ABGs, respiratory failure and hemodynamic monitoring  Location: right radial    Sedation:  Patient sedated: yes  Sedation type: moderate (conscious) sedation  Sedatives: see MAR for details  Analgesia: see MAR for details  Number of attempts: 1  Post-procedure: line sutured and dressing applied  Post-procedure " CMS: normal  Patient tolerance: Patient tolerated the procedure well with no immediate complications  Comments: Dr. Westfall was present during critical portions of the procedure.      Elyse Gill MD, PhD  PGY-2 Internal Medicine  Pager: [phone]             Elyse Gill

## 2018-02-27 NOTE — PLAN OF CARE
Problem: Patient Care Overview  Goal: Plan of Care/Patient Progress Review  Outcome: No Change  Neuro: A&Ox4. Pleasant.   Cardiac: SR-ST w/ occasional PAC. VSS.   Respiratory: Sating 88-95% on 75% Bipap or 100% High flow NC while at rest. Requires 100% Bipap with activity such as getting up to commode. Received PRN Tessalon for cough Suppression this morning. Patient experiences coughing fits in the morning, during the morning lung sounds are more coarse. Coughing seems to subside throughout the day and lung sounds more clear.   GI/: Adequate urine output. BM X1.   Diet/appetite: Tolerating regular diet. Eating well.  Activity:  Assist of 1, up to commode.  Pain: Denies.  Skin: Upper and lower extremities cold and cyanotic.   LDA's: L PIV SL.     Plan: Continue with POC. Notify primary team with changes.

## 2018-02-27 NOTE — PROGRESS NOTES
Assisted with endotracheal intubation for respiratory failure/airway protection. A 7.5fr ETT was placed and secured at 22 cm @ lip. Positive color change noted with CO2 detector, breath sounds were coarse, clearing with ETT suctioning. Patient placed on full vent support -CMV 24 400 1.00 peep 10, labs and CXR pending.    Octavio Hoffman, RT  2/27/2018 1:21 PM

## 2018-02-27 NOTE — PLAN OF CARE
Problem: Chronic Respiratory Difficulty Comorbidity  Intervention: Promote Respiratory Management  Patient continues to require Bipap or high flow nasal cannula at all times with increased need when active.

## 2018-02-27 NOTE — PLAN OF CARE
Problem: Patient Care Overview  Goal: Plan of Care/Patient Progress Review  Outcome: No Change  Neuro: A&Ox4.   Cardiac: SR, HR 80-100s. VSS.             Respiratory: Sating 88%-94% on bipap % FiO2. Did not tolerate 100% HFNC at rest. Pt slept well on bipap, when awake she expressed increased SOB, appeared air hungry at times at rest. Frequent non-productive cough, declined Tessalon. Declined ativan.   Diet/appetite: Tolerating regular diet. Fair appetite today.   Activity:  Independently repositions in bed.   Pain: Denies.   Skin: Intact, no new deficits noted. Cold/dusky toes/fingers per pts baseline.   LDA's: L PIV, TKO.       Plan: Paged crosscover regarding concerns. Continue with POC. Notify primary team with changes.    Problem: Chronic Respiratory Difficulty Comorbidity  Goal: Chronic Respiratory Difficulty  Patient comorbidity will be monitored for signs and symptoms of Respiratory Difficulty (Chronic) condition.  Problems will be absent, minimized or managed by discharge/transition of care.   Outcome: No Change  Maintaining goal of 88-92% SpO2 with bipap % FiO2, although feeling increased SOB.

## 2018-02-27 NOTE — PLAN OF CARE
Problem: Patient Care Overview  Goal: Plan of Care/Patient Progress Review  OT 6B: Cancel - pt with worsening respiratory status and transferred to 4AB. Will reschedule.

## 2018-02-27 NOTE — PROGRESS NOTES
INTERNAL MEDICINE PROGRESS/TRANSFER NOTE    Kecia Blue ([id number]) admitted on 2/21/2018 02/27/2018    Changes today:  - CXR portable - not appreciably worse compared to prior  - Procalcitonin -pending  - Respiratory viral panel - pending  - Duonebs  - Arterial blood gas  - EKG - sinus tach with ?DELORES and PVCs.  - Weights today - if elevated will consider gentle diuresis  - CT scan given moderate possibility of a PE - ordered  - Transfer to ICU if work of breathing increases.  - Change BIPAP mask to full face given stage 1 pressure ulcer on nose  - Touch base with pharmacy re: transitioning meds to IV (including IS regimen)  - Consider giving fluids for now since patient on BIPAP and not currently receiving nutrition      Assessment & Plan:    Kecia Blue is a 57 year old female with a history of dermatomyositis, seronegative RA, ILD and pulmonary hypertension who was admitted from the cath lab with worsening shortness of breath and hypoxia, now listed for lung transplant.     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 No s/s suggestive of an infection and infectious workup negative so far. Other considerations although less likely, include PE. No hx of L heart failure. Cath with clean coronaries and normal left-sided pressures. Has severe pHTN at baseline but no evidence of volume overload on exam. Transplant workup now completed. ENT eval-ed, no abnormality seen on laryngoscopy. Rheumatology eval-ed, pt w/ good control of underlying rheumatologic disease, additional diagnostic tests ordered that will impact transplant status. CT chest 2/22/2015 w/ pneumomediastinum which per thoracic surgery is appropriate for conservative management.     2/27 - noted to have increasing hypoxia. Has gone from 60% FiO2 to 100% FiO2 overnight with  tachycardia and tachypnea. Now having increased work of breathing. Workup so far (in red below) not suggestive of any acute cause.     - Pulmonary consult. Appreciate involvement.   - Supplemental O2. Wean as able - HFNC w/ goal SaO2 88-92%  - Prednisone at 20mg qday. No higher dose due to transplant workup  - Continue Azathioprine, tacrolimus at current doses  - Continue Bactrim and Nystatin  - Pulmonary rehab consult  - Await transplant.   - CXR portable - not appreciably worse compared to prior  - Procalcitonin -pending  - Respiratory viral panel - pending  - Duonebs  - Arterial blood gas - worsening hypoxia  - EKG - sinus tach with ?DELORES and PVCs.  - Weights today - if elevated will consider gentle diuresis  - CT scan given moderate possibility of a PE - ordered  - Transfer to ICU if work of breathing increases.  - Change BIPAP mask to full face given stage 1 pressure ulcer on nose  - Touch base with pharmacy re: transitioning meds to IV (including IS regimen)  - Consider giving fluids for now since patient on BIPAP and not currently receiving nutrition    #Elevated creatinine, resolved  Trended up on presentation in setting of poor PO intake. BUN:Cr consistent with prerenal. Resolved with IVF bolus and improved PO.   - Trend Creatinine qod  - Bolus prn if creatinine increase    CHRONIC PROBLEMS  #Rheumatoid arthritis/dermatomyositis  - Continue Immunosuppresive meds as above. Rheumatology consulted and signed off now, no further testing or changes in therapy. Rheum signed off as of 02/26.     Medications held:   None  FEN: Lytes replacement protocol  Regular diet (NPO if on BIPAP)    Prophylaxis:   DVT: Lovenox  GI: None  Disposition: Inpatient till lung transplant. Transfer to ICU cares given ongoing increasing hypoxia and work of breathing    Code Status: FULL - Confirmed with  and patient    Patient was  discussed with Staff, Dr. Breen who agrees with above assessment and plan.    Chris Lilly  PGY-2  "Internal Medicine  Pager: [phone]  ==================================================================  Subjective:  RN notes reviewed. Patient having increased oxygen needs overnight (from HFNC to 60% FiO2 to 100% FiO2) and still satting in mid 80s to low 90s. Also noted to be tachypneic and tachycardic. Patient does endorse that her breathing feels worse. Workup commenced and  on board with plan for today. Readdressed code status with  and patient who agree with potential intubation and wish to remain full code.    Objective:  Most recent vital signs:  BP (!) 136/95 (BP Location: Left arm)  Pulse 88  Temp 98.9  F (37.2  C) (Axillary)  Resp (!) 32  Ht 1.6 m (5' 3\")  Wt 51.7 kg (114 lb)  SpO2 92%  BMI 20.19 kg/m2  Temp:  [98.1  F (36.7  C)-99  F (37.2  C)] 98.9  F (37.2  C)  Heart Rate:  [] 110  Resp:  [20-32] 32  BP: (115-139)/(84-95) 136/95  FiO2 (%):  [85 %-100 %] 100 %  SpO2:  [90 %-97 %] 92 %  Wt Readings from Last 2 Encounters:   02/25/18 51.7 kg (114 lb)   02/20/18 51.5 kg (113 lb 9.6 oz)       Intake/Output Summary (Last 24 hours) at 02/25/18 2021  Last data filed at 02/25/18 2000   Gross per 24 hour   Intake              640 ml   Output              700 ml   Net              -60 ml       Physical exam:  General: Patient lying comfortably in bed, with BIPAP mask in place at 100% FiO2  HEENT: EOMI, PERRL, no scleral icterus or injection, no bruits appreciated, no JVD, MMM  Cardiac: tachycardic, no m/r/g appreciated.   Respiratory: Tachypneic, diffuse bilateral fine crackles throughout lung fields.   Extremities: Dusky extremities with clubbing noted  Skin: No acute lesions appreciated  Neuro: AOx3, CN II-XII grossly intact    Labs (Past three days):  Reviewed    Imaging/procedure results:   Reviewed    "

## 2018-02-27 NOTE — PROCEDURES
"Procedure/Surgery Information   [facility], [place]    Bedside Procedure Note  Date of Service (when I performed the procedure): 02/27/2018    Kecia Blue is a 57 year old female patient.  1. Organ transplant candidate    2. Lung disease, interstitial (H)    3. Abnormal chest CT      Past Medical History:   Diagnosis Date     Antisynthetase syndrome (H) 2014     Chronic cough      Dermatomyositis (H)      Dysplasia of cervix, low grade (ESTRADA 1)      ILD (interstitial lung disease) (H)      Osteopenia      Pulmonary hypertension      Raynaud's disease      Seronegative rheumatoid arthritis (H)      Temp: 99.7  F (37.6  C) Temp src: Axillary BP: 95/54   Heart Rate: 110 Resp: 24 SpO2: 95 % O2 Device: BiPAP/CPAP      Central line  Date/Time: 2/27/2018 3:05 PM  Performed by: GABRIEL OLIVA  Authorized by: GABRIEL OLIVA   Consent: Verbal consent obtained. Written consent obtained.  Risks and benefits: risks, benefits and alternatives were discussed  Consent given by: patient  Patient understanding: patient states understanding of the procedure being performed  Patient consent: the patient's understanding of the procedure matches consent given  Procedure consent: procedure consent matches procedure scheduled  Relevant documents: relevant documents present and verified  Test results: test results available and properly labeled  Site marked: the operative site was marked  Imaging studies: imaging studies available  Required items: required blood products, implants, devices, and special equipment available  Patient identity confirmed: arm band, provided demographic data and hospital-assigned identification number  Time out: Immediately prior to procedure a \"time out\" was called to verify the correct patient, procedure, equipment, support staff and site/side marked as required.  Indications: vascular access and central pressure monitoring  Anesthesia: see MAR for " details    Sedation:  Patient sedated: yes  Sedation type: anxiolysis  Vitals: Vital signs were monitored during sedation.  Preparation: skin prepped with chlorhexidine and sterile field established  Location details: right internal jugular  Patient position: flat  Catheter type: triple lumen  Pre-procedure: landmarks identified  Ultrasound guidance: yes  Number of attempts: 1  Successful placement: yes  Post-procedure: line sutured and dressing applied  Assessment: blood return through all parts,  free fluid flow and placement verified by x-ray  Patient tolerance: Patient tolerated the procedure well with no immediate complications           Flaco Butler

## 2018-02-27 NOTE — H&P
MICU History and Physical      Patient Name: Kecia Blue MRN# [id number]   Age: 57 year old YOB: 1960     Date of Admission:2/21/2018  Primary care provider: Rosy Vu  Date of Service: 2/27/2018  Admitting Team: MICU         Assessment and Plan:   Kecia Blue is a 57 year old female with history of dermatomyositis, eronegative RA, ILD, and pulmonary hypertension, who is undergoing lung transplant work-up, and was admitted for increasing shortness of breath and hypoxia; transferred to ICU from Medicine as patient required intubation.    ===NEURO/PSYCH===  # Sedation  - Fentanyl 100 mcg/hr  - Midazolam 6 mg/h  - Vecuronium 0.8 mcg/kg/min    ===PULMONARY===  # Acute on chronic respiratory failure, worsening  # ILD related to dermatomyositis, on transplant list  # Severe pulmonary hypertension (WHO class III)  # Pneumomediastinum, 2/22/2018  Presumed worsening hypoxia and respiratory distress 2/2 disease progression. Infectious work-up has been negative, RVP pending. Cath done prior to admission, clean coronaries and normal left-sided pressures. Severe pulmonary HTN at baseline, but no evidence of volume overload. Transplant work-up completed. CT chest 2/22/2018 showed pneumomediastinum, for which thoracic surgery evaluated and stated conservative management.   - Ventilator: CMV/AC, 28RR, 350TV, 12 PEEP, FiO2 100%   - Keep FiO2 at 100%, only adjust PEEP  - Flolan 20 ng/kg/min  - Inhaled NO at 20ppm  - No benefit in proning patient, keep supine  - ECMO likely tomorrow  - Q4H ABG  - Dobutamine started at 2.5mcg/kg/min, if patient tolerates (no hypotension or profound tachycardia) then increase to 5 mcg/kg/min    ===CARDIAC===   # Right heart failure  # Pulmonary HTN  Patient had echocardiogram today that showed PA pressure of 100. Most likely 2/2 pulmonary hypertension from ILD, but cannot rule out PE - unlikely given hypotension occurred after intubation and did not occur at the  same time as her worsening hypoxia.   - Dobutamine started    ===RENAL===  Intake/Output Summary (Last 24 hours) at 02/27/18 1315  Last data filed at 02/27/18 0720   Gross per 24 hour   Intake              360 ml   Output              775 ml   Net             -415 ml     # RADHA, resolved  On presentation, patient had elevated BUN:Cr suggestive of prerenal, resolved with fluids.  - Monitor with labs daily    ===INFECTIOUS DISEASE===  # Respiratory failure  Infectious causes have been worked up and found negative so far.  - Respiratory Viral Panel  - Sputum gram stain, culture    Antimicrobials:   Bactrim, chronic medication MWF  Cultures/ID workup:  - 2/27 Sputum stain/culture pending  - 2/27 RVP pending  - 2/22 negative influenza  - 2/21 Blood cultures x 2 final result no growth    ===GASTROINTESTINAL/GENITOURINARY===  No acute issues    Nutrition: Feeding tube placed, nutrition consulted for tube feeding management    ===ENDOCRINE===  No acute issues    ===HEMATOLOGIC===  No acute issues    ===SKIN/MSK===  # Dermatomyositis  - Continue home medications: tacrolimus, azathioprine  - D/C home prednisone (5mg daily) --> giving stress dose steroids of hydrocortisone 50mg Q6H    # Immunocompromised 2/2 medication  - Continue home medicaitons: nystatin, bactrim    FEN: Nutrition consulted for tube feeding  PPX: Famotidine BID, lovenox  Family: Updated at bedside  Code status: Full code  Dispo: Critically ill, may be transferred to SICU if started on ECMO as bridge to lung transplant    Patient seen and discussed with Dr. Dalal, fellow, and Dr. Westfall, staff who agrees with the above assessment and plan.    Regina He DO  PGY1 Family Medicine Resident  Pager: [phone]  and  Elyse Gill MD, PhD  PGY-2 Internal Medicine  Pager: [phone]           Chief Complaint:   Increased work of breathing         HPI:   Kecia Blue is a 57 year old female with history of dermatomyositis, seronegative RA, ILD, and  "pulmonary hypertension, who is undergoing lung transplant work-up, and was admitted for increasing shortness of breath and hypoxia. She was admitted on 2/21/2018 to Medicine for increasing shortness of breath. She was on 3-5L O2, and then has slowly been increasing her oxygen use, up to 10-15L. While admitted to medicine, she underwent work-up for her worsening SOB. Patient required HFNC with BiPAP at night to keep SaO2 88-92%. Last night, patient's BiPAP was ineffective, her FiO2 needs increased from 60 to 100%, to saturate in mid-80's, low-90's. This morning, providers were called to bedside, as patient stated she felt she was \"struggling.\" Patient was tachycardic in 110's - patient does have a history of intermittent tachycardia, but was persistent and elevated. She was tachypneic past her baseline of 30's (rate was in the mid-40's). CXR was done, and did not appear different from prior exam, RVP, pro-yury ordered, and patient given duonebs. Blood gases revealed hypoxia, 60% on 100%FiO2. EKG showed sinus tachycardia. Team also noted crackles present, worse than baseline. Patient continued to worsen, with significant increase work of breathing, using accessory muscles and became diaphoretic.    Through work-up, admitting team felt patient is experiencing progression of her ILD. Pulmonology and transplant consulted, patient placed on transplant list on 2/23/2018. ENT consulted for increased uptake at base of tongue on PET but did not see an abnormality on laryngoscopy. Rheum consulted, found patient to have good control of underlying rheumatologic disease. Found to have pneumomediastinum on chest CT, evaluated by thoracic surgery and stated okay to observe.    Patient was diagnosed with dermatomyositis in 2014, and then ILD in 2015.          Past Medical History:   Reviewed in EMR, unable to confirm with patient since intubated and sedated.    Past Medical History:   Diagnosis Date     Antisynthetase syndrome (H) " 2014     Chronic cough      Dermatomyositis (H)      Dysplasia of cervix, low grade (ESTRADA 1)      ILD (interstitial lung disease) (H)      Osteopenia      Pulmonary hypertension      Raynaud's disease      Seronegative rheumatoid arthritis (H)             Past Surgical History:   Reviewed in EMR, unable to confirm with patient since intubated and sedated.    Past Surgical History:   Procedure Laterality Date     ENT SURGERY      tonsillectomy as a child     no prior surgery              Social History:   Reviewed in EMR, unable to confirm with patient since intubated and sedated.    Social History     Social History     Marital status:      Spouse name: N/A     Number of children: N/A     Years of education: N/A     Occupational History     Not on file.     Social History Main Topics     Smoking status: Never Smoker     Smokeless tobacco: Never Used     Alcohol use 0.6 oz/week     1 Glasses of wine per week      Comment: 1 glass 3 times weekly     Drug use: No     Sexual activity: Not on file     Other Topics Concern     Parent/Sibling W/ Cabg, Mi Or Angioplasty Before 65f 55m? No     Social History Narrative            Family History:   Reviewed in EMR, unable to confirm with patient since intubated and sedated.    Family History   Problem Relation Age of Onset     Hypertension Mother      Arthritis Mother      Pancreatic Cancer Father      DIABETES Father             Allergies:    No Known Allergies         Medications:     Prior to Admission Medications   Prescriptions Last Dose Informant Patient Reported? Taking?   AZATHIOPRINE PO 2/20/2018 at 2200  Yes Yes   Sig: Take 50 mg by mouth 2 times daily 50 mg tablet, 1.5 tablets by mouth 2 times a day   PREDNISONE PO 2/20/2018 at 0800  Yes Yes   Sig: Take 5 mg by mouth daily   TACROLIMUS PO 2/20/2018 at 2200  Yes Yes   Sig: Take 0.1 mg by mouth 2 times daily 0.1 mg capsule, take 4 capsules by mouth 2 times a day.   calcium carbonate (OS-CHELSEY 500 MG [place]. CA) 1250  "MG tablet 2/20/2018 at 2200  Yes Yes   Sig: Take 1 tablet by mouth 2 times daily   multivitamin, therapeutic with minerals (THERA-VIT-M) TABS tablet 2/20/2018 at 0800  Yes Yes   Sig: Take 1 tablet by mouth daily   nystatin (MYCOSTATIN) 112694 UNIT/ML suspension More than a month at Unknown time  Yes No   Sig: Take 100,000 Units by mouth 4 times daily 100,000 unit/ ml, take 4- 6 ml by mouth 4 times a day.   order for DME 2/21/2018 at Unknown time  No Yes   Sig: Equipment being ordered: Oxygen 5 liters at rest, 15 liters with exertion.  Needs portability.  Please provide 2 10-liter concentrators for in the home   sulfamethoxazole-trimethoprim (BACTRIM DS/SEPTRA DS) 800-160 MG per tablet Past Week at Unknown time  Yes Yes   Sig: Take 1 tablet by mouth Every Mon, Wed, Fri Morning      Facility-Administered Medications: None             Review of Systems:   Unable to obtain as patient intubated and sedated.         Physical Exam:   Blood pressure 95/57, pulse 88, temperature 99.7  F (37.6  C), temperature source Axillary, resp. rate 23, height 1.6 m (5' 3\"), weight 51.3 kg (113 lb 1.5 oz), SpO2 (!) 84 %.  FiO2 (%): 100 %  Resp: 23    General: Intubated, sedated, NAD  HEENT: Left pupil 3mm reactive light, Right 6mm and not reactive light, intubated, slightly dry mucosal membranes  Neck: no cervical lymphadenopathy  Chest/Resp: coarse mechanical breath sounds with bibasilar crackles, no wheezes  Heart/CV: RRR, normal S1 and S2, no murmurs, 1+ right radial, unable to palpate right radial  Abdomen/GI: soft, NTND, bowel sounds present, no masses  Extremities/MSK: no LE edema, WWP  Neuro: sedated    Tubes/Lines/Devices: CVC triple lumen, PIV, urinary catheter  Peripheral IV 02/21/18 Left;Lateral Upper forearm (Active)   Site Assessment WDL 2/27/2018  8:00 AM   Line Status Saline locked 2/27/2018  8:00 AM   Phlebitis Scale 0-->no symptoms 2/27/2018  8:00 AM   Infiltration Scale 0 2/27/2018  8:00 AM   Infiltration Site Treatment " Method  None 2/24/2018  4:00 AM   Extravasation? No 2/24/2018  4:00 AM   Number of days:6            Data:   ROUTINE ICU LABS (Last four results)  CMP  Recent Labs  Lab 02/27/18  1013 02/27/18  0537 02/26/18  0506 02/24/18  0606 02/23/18  1559 02/23/18  0655 02/22/18  0503 02/21/18  1439   NA  --   --  142 141 138 140 139 142   POTASSIUM 4.5  --  4.4 4.2 4.2 4.2 4.5 4.3   CHLORIDE  --   --  108 107 106 106 105 107   CO2  --   --  29 28 26 28 26 28   ANIONGAP  --   --  5 6 7 7 8 7   GLC  --   --  76 87 220* 88 110* 96   BUN  --   --  25 20 19 25 23 16   CR  --  0.97 0.93 0.84 0.92 1.15* 1.05* 0.93   GFRESTIMATED  --  60* 62 71 63 49* 54* 63   GFRESTBLACK  --  72 75 86 77 59* 66 76   CHELSEY  --   --  8.5 8.2* 8.4* 8.9 8.9 8.6   MAG  --   --   --  1.8  --  1.6 1.6  --    PROTTOTAL  --   --   --   --   --   --   --  7.8   ALBUMIN  --   --   --   --   --   --   --  3.2*   BILITOTAL  --   --   --   --   --   --   --  0.7   ALKPHOS  --   --   --   --   --   --   --  58   AST  --   --   --   --   --   --   --  26   ALT  --   --   --   --   --   --   --  20     CBC  Recent Labs  Lab 02/26/18  0506 02/24/18  0606 02/23/18  0655 02/22/18  0503   WBC 12.2* 13.3* 10.2 12.1*   RBC 3.91 4.02 4.05 4.09   HGB 13.0 13.4 13.6 14.0   HCT 41.5 41.9 41.6 42.5   * 104* 103* 104*   MCH 33.2* 33.3* 33.6* 34.2*   MCHC 31.3* 32.0 32.7 32.9   RDW 15.2* 15.1* 15.3* 15.4*    339 349 362     INRNo lab results found in last 7 days.  Arterial Blood Gas  Recent Labs  Lab 02/27/18  1013 02/21/18  1615   PH 7.43 7.46*   PCO2 41 36   PO2 66* 93   HCO3 28 26   O2PER 100 70     Venous Blood Gas   Recent Labs  Lab 02/27/18  1013 02/21/18  1615   O2PER 100 70         Results for orders placed or performed during the hospital encounter of 02/21/18   XR Chest Port 1 View    Narrative    Exam:  Chest X-ray 2/21/2018 2:12 PM    History: Acute on chronic hypoxic respiratory failure in pt w/ hx of  ILD;     Comparison: Chest x-ray  2/19/2018    Findings: Portable AP radiographs of the chest. The cardiac silhouette  is normal in size. The pulmonary vasculature is indistinct. Bilateral,  basilar predominant reticulonodular opacities which appear unchanged  from prior exam. No pleural effusion or pneumothorax. Air distention  of loops of bowel in the visualized upper abdomen.      Impression    Impression:  1. Bilateral, basilar predominant reticulonodular opacities,  consistent with known history of interstitial lung disease. Findings  appear similar to the prior exam on 2/19/2018.  2. No definite superimposed acute airspace disease.    I have personally reviewed the examination and initial interpretation  and I agree with the findings.    ANNE MANZANO MD   CT Chest w/o Contrast     Value    Radiologist flags Pneumomediastinum (Urgent)    Narrative    EXAMINATION: Chest CT       CLINICAL HISTORY: Interstitial lung disease.  history of dermatomyositis with associated ILD, pulmonary  hypertension, and seronegative RA . Never smoker per medical chart.    COMPARISON: CT 9/19/2017, 2/18/2015.    TECHNIQUE: CT imaging obtained through the chest without intravenous  contrast. Coronal and axial MIP reformatted images obtained.    FINDINGS:  Heart size is prominent. Trace pericardial effusion. Unchanged mildly  enlarged main pulmonary artery. Enlarged mediastinal lymph nodes,  similar to prior, likely reactive. Calcified right hilar lymph nodes.  Central tracheobronchial tree is patent. No pleural effusion or  pneumothorax. Bilateral interstitial changes, with lower lobe  predominance. However there is some upper lobe reticulation at the  subpleural level. Mild bibasilar bronchiectasis. No honeycombing.  Interlobular septal thickening. Scattered groundglass changes.  Findings are largely unchanged since prior, however there has been  some decrease in scattered areas of consolidation in the upper lobes.    Bones and soft tissues: No suspicious bone  findings. Mild anterior  wedging of upper thoracic vertebral bodies, unchanged. Mild  degenerative changes of thoracic spine. Subcutaneous emphysema along  the left neck, extending exam mediastinum, with pneumomediastinum.    Partially imaged upper abdomen: Limited.      Impression    IMPRESSION:   1. Unchanged findings consistent with known interstitial lung disease.  Findings are inconsistent with UIP pattern. Differential includes  NSIP.  2. Pneumomediastinum extending into the subcutaneous soft tissue of  the left neck. These findings may be seen with interstitial lung  disease, although differential includes infection and barotrauma.  Correlate with any recent procedures.        [Access Center: Pneumomediastinum]    This report will be copied to the [facility] to ensure a  provider acknowledges the finding. Access Center is available Monday  through Friday 8am-3:30 pm.        I have personally reviewed the examination and initial interpretation  and I agree with the findings.    ADAM GONZALEZ MD           Critical Care Services Progress Note:     Kecia Blue remains critically ill with hypoxic respiratory failure due to ILD exacerbation     I personally examined and evaluated the patient today.   The patient s prognosis today is grave  I have evaluated all laboratory values and imaging studies from the past 24 hours.  Key findings and decisions made today included intubation, mechanical ventilation, ECMO evaluation  I personally managed the ventilator, sedation, pain control and analgesia, metabolic abnormalities, antibiotic therapy and vasoactive medications.   Consults ongoing and ordered are Surgery and pulmonary  Procedures that will happen today are: arterial line placement, endotracheal intubation, mechanical ventilation and VA ECMO  All treatments were placed at my direction.  I formulated today s plan with the house staff team or resident(s) and agree with the findings and plan in the  associated note.       The above plans and care have been discussed with spouse and all questions and concerns were addressed.     I spent a total of 120 minutes (excluding procedure time) personally providing and directing critical care services at the bedside and on the critical care unit for Kecia Blue.        Kian Westfall

## 2018-02-27 NOTE — PROGRESS NOTES
02/27/18 1200   Call Information   Date of Call 02/27/18   Time of Call 1200   Name of person requesting the team Keri ESN   Title of person requesting team RN   RRT Arrival time 1200   Time RRT ended 1230   Reason for call   Type of RRT Adult   Primary reason for call Respiratory   Respiratory Rate greater than 24;O2sat less than 88% for greater than 5 minutes despite O2;Labored breathing;Respiratory pattern change   SBAR   Situation desats to 90 on 100% FIO2   Background dermomyotosis, RA, ILD, work up for lung TX   Notable History/Conditions Hypertension   Assessment unstable sats with 100% bipap   Interventions No interventions   Patient Outcome   Patient Outcome Transferred to  (Unit 4A)   RRT Team   Attending/Primary/Covering Physician Dr Shore   Date Attending Physician notified 02/27/18   Time Attending Physician notified 1234   Physician(s) Dr Nivia Cunningham

## 2018-02-27 NOTE — PROGRESS NOTES
Transplant Social Work Services Progress Note      Date of Initial Social Work Evaluation: 2/20/2017  Collaborated with: pulm. Team, bedside nursing    Data: spent time with patient's family before and after transfer to ICU today.    Intervention: supportive counseling.  Transplant education to patient's 3 adult children.  Discussed what patient care needs will be post transplant and what to expect while she waits, given her critical illness.  Discussed way to support their parents and each other.  Later in day, supportive visit with patient spouse and children.  Connected them with bedside nurse for update on patient condition.  Supportive counseling.   Assessment: family coping ok, but understandably frightened.    Education provided by SW: as above     Plan:    Discharge Plans in Progress: none.      Barriers to d/c plan: critically ill.      Follow up Plan: will continue to follow for support, counseling, education and resources.

## 2018-02-27 NOTE — ANESTHESIA CARE TRANSFER NOTE
Patient: Kecia Blue    * No procedures listed *    Diagnosis: * No pre-op diagnosis entered *  Diagnosis Additional Information: No value filed.    Anesthesia Type:   No value filed.     Note:  Airway :ETT and Ventilator  Patient transferred to:ICU  Comments: Patient intubated without difficulty; placed on vent per ICU team; nikki. br sounds; SPO2 82-85%; ICU team aware and treating; hypotension from induction agent treated; sedation and care to ICU teamICU Handoff: Call for PAUSE to initiate/utilize ICU HANDOFF, Identified Patient, Identified Responsible Provider, Reviewed the Pertinent Medical History, Discussed Surgical Course, Reviewed Intra-OP Anesthesia Management and Issues during Anesthesia, Set Expectations for Post Procedure Period and Allowed Opportunity for Questions and Acknowledgement of Understanding      Vitals: (Last set prior to Anesthesia Care Transfer)    CRNA VITALS  2/27/2018 1239 - 2/27/2018 1309      2/27/2018             SpO2: (!)  82 %    EKG: Sinus tachycardia                Electronically Signed By: EVI CUMMINS APRN CRNA  February 27, 2018  1:09 PM

## 2018-02-27 NOTE — ANESTHESIA PROCEDURE NOTES
ANESTHESIOLOGY RESIDENT/CRNA INTUBATION NOTE  Indication for intubation: respiratory insufficiency.  Provider Ordering Intubation: DMITRY SILVA  History regarding the most recent potassium obtained: Yes  History regarding renal failure obtained: Yes  History of presence or absence of CVA/stroke was obtained: Yes  History of presence or absence of NM disorder obtained: Yes  Post Intubation:  ETT secured, Vent settings by primary/ICU team, Primary/ICU team to review CXR, Report given to primary nurse and/or team, No apparent complications and Sedation to be ordered by primary/ICU team  Versed drip ordered by ICU team

## 2018-02-27 NOTE — PROGRESS NOTES
"UPDATE     Subjective:  Called to see patient who was having increasing work of breathing with increasing use of accesory muscles of respiration. Patient endorses feeling more tired and is amenable to being intubated. Family at bedside and agree with plan.    Objective:  /90 (BP Location: Left arm)  Pulse 88  Temp 99.7  F (37.6  C) (Axillary)  Resp (!) 40  Ht 1.6 m (5' 3\")  Wt 51.3 kg (113 lb 1.5 oz)  SpO2 94%  BMI 20.03 kg/m2    Physical exam:  General: Patient sitting up in bed, BIPAP mask in place and appears moderately uncomfortable.  HEENT: EOMI, PERRL, no scleral icterus or injection, no bruits appreciated, no JVD, MMM  Cardiac: tachycardia, no m/r/g appreciated.   Respiratory: Use of accessory muscles of respiration, tachypniec, diffuse bilateral fine crackles throughout lung fields.   Extremities: Dusky extremities with clubbing noted  Skin: No acute lesions appreciated  Neuro: AOx3, CN II-XII grossly intact    Assessment and plan:  56 yo lady with acute on chronic hypoxic respiratory failure from ILD currently awaiting transplant, now having worsening hypoxia, tachypnea and tachycardia. Increased work of breathing at this time.    Will need to be transferred to the ICU for closer monitoring and will likely require intubation. ICU team aware of patient.     Chris Lilly  PGY-2 Internal Medicine  [facility] Resident.  Pager: [phone]    "

## 2018-02-28 ENCOUNTER — APPOINTMENT (OUTPATIENT)
Dept: GENERAL RADIOLOGY | Facility: CLINIC | Age: 58
DRG: 003 | End: 2018-02-28
Attending: INTERNAL MEDICINE
Payer: COMMERCIAL

## 2018-02-28 ENCOUNTER — ANESTHESIA EVENT (OUTPATIENT)
Dept: SURGERY | Facility: CLINIC | Age: 58
End: 2018-02-28

## 2018-02-28 LAB
ALBUMIN SERPL-MCNC: 2.4 G/DL (ref 3.4–5)
ALT SERPL W P-5'-P-CCNC: 361 U/L (ref 0–50)
ANION GAP SERPL CALCULATED.3IONS-SCNC: 11 MMOL/L (ref 3–14)
ANION GAP SERPL CALCULATED.3IONS-SCNC: 11 MMOL/L (ref 3–14)
ANION GAP SERPL CALCULATED.3IONS-SCNC: 12 MMOL/L (ref 3–14)
ANION GAP SERPL CALCULATED.3IONS-SCNC: 8 MMOL/L (ref 3–14)
ANION GAP SERPL CALCULATED.3IONS-SCNC: 9 MMOL/L (ref 3–14)
APTT PPP: 52 SEC (ref 22–37)
APTT PPP: 54 SEC (ref 22–37)
APTT PPP: 81 SEC (ref 22–37)
APTT PPP: 98 SEC (ref 22–37)
AT III ACT/NOR PPP CHRO: 87 % (ref 85–135)
BASE DEFICIT BLDA-SCNC: 0.2 MMOL/L
BASE DEFICIT BLDA-SCNC: 1.4 MMOL/L
BASE EXCESS BLDA CALC-SCNC: 0 MMOL/L
BASE EXCESS BLDA CALC-SCNC: 0.2 MMOL/L
BASE EXCESS BLDA CALC-SCNC: 0.8 MMOL/L
BASE EXCESS BLDA CALC-SCNC: 1.7 MMOL/L
BASE EXCESS BLDA CALC-SCNC: 2.1 MMOL/L
BASE EXCESS BLDA CALC-SCNC: 2.1 MMOL/L
BASE EXCESS BLDA CALC-SCNC: 2.3 MMOL/L
BASE EXCESS BLDA CALC-SCNC: 2.4 MMOL/L
BASE EXCESS BLDA CALC-SCNC: 2.6 MMOL/L
BASE EXCESS BLDA CALC-SCNC: 2.7 MMOL/L
BASE EXCESS BLDA CALC-SCNC: 2.9 MMOL/L
BASE EXCESS BLDV CALC-SCNC: 0.4 MMOL/L
BASE EXCESS BLDV CALC-SCNC: 1.1 MMOL/L
BASOPHILS # BLD AUTO: 0 10E9/L (ref 0–0.2)
BASOPHILS NFR BLD AUTO: 0.1 %
BUN SERPL-MCNC: 33 MG/DL (ref 7–30)
BUN SERPL-MCNC: 35 MG/DL (ref 7–30)
BUN SERPL-MCNC: 35 MG/DL (ref 7–30)
BUN SERPL-MCNC: 36 MG/DL (ref 7–30)
BUN SERPL-MCNC: 36 MG/DL (ref 7–30)
CA-I BLD-MCNC: 4.5 MG/DL (ref 4.4–5.2)
CA-I BLD-MCNC: 4.6 MG/DL (ref 4.4–5.2)
CA-I BLD-MCNC: 4.6 MG/DL (ref 4.4–5.2)
CA-I BLD-MCNC: 4.7 MG/DL (ref 4.4–5.2)
CALCIUM SERPL-MCNC: 7.8 MG/DL (ref 8.5–10.1)
CALCIUM SERPL-MCNC: 8.1 MG/DL (ref 8.5–10.1)
CALCIUM SERPL-MCNC: 8.1 MG/DL (ref 8.5–10.1)
CALCIUM SERPL-MCNC: 8.2 MG/DL (ref 8.5–10.1)
CALCIUM SERPL-MCNC: 8.3 MG/DL (ref 8.5–10.1)
CHLORIDE SERPL-SCNC: 105 MMOL/L (ref 94–109)
CHLORIDE SERPL-SCNC: 106 MMOL/L (ref 94–109)
CHLORIDE SERPL-SCNC: 106 MMOL/L (ref 94–109)
CHLORIDE SERPL-SCNC: 107 MMOL/L (ref 94–109)
CHLORIDE SERPL-SCNC: 108 MMOL/L (ref 94–109)
CO2 SERPL-SCNC: 23 MMOL/L (ref 20–32)
CO2 SERPL-SCNC: 23 MMOL/L (ref 20–32)
CO2 SERPL-SCNC: 24 MMOL/L (ref 20–32)
CO2 SERPL-SCNC: 24 MMOL/L (ref 20–32)
CO2 SERPL-SCNC: 25 MMOL/L (ref 20–32)
CREAT SERPL-MCNC: 1.1 MG/DL (ref 0.52–1.04)
CREAT SERPL-MCNC: 1.23 MG/DL (ref 0.52–1.04)
CREAT SERPL-MCNC: 1.26 MG/DL (ref 0.52–1.04)
CREAT SERPL-MCNC: 1.26 MG/DL (ref 0.52–1.04)
CREAT SERPL-MCNC: 1.28 MG/DL (ref 0.52–1.04)
D DIMER PPP FEU-MCNC: 0.7 UG/ML FEU (ref 0–0.5)
D DIMER PPP FEU-MCNC: 0.9 UG/ML FEU (ref 0–0.5)
DIFFERENTIAL METHOD BLD: ABNORMAL
EOSINOPHIL # BLD AUTO: 0 10E9/L (ref 0–0.7)
EOSINOPHIL NFR BLD AUTO: 0 %
ERYTHROCYTE [DISTWIDTH] IN BLOOD BY AUTOMATED COUNT: 14.9 % (ref 10–15)
ERYTHROCYTE [DISTWIDTH] IN BLOOD BY AUTOMATED COUNT: 15 % (ref 10–15)
ERYTHROCYTE [DISTWIDTH] IN BLOOD BY AUTOMATED COUNT: 15.1 % (ref 10–15)
ERYTHROCYTE [DISTWIDTH] IN BLOOD BY AUTOMATED COUNT: 15.2 % (ref 10–15)
FIBRINOGEN PPP-MCNC: 642 MG/DL (ref 200–420)
FIBRINOGEN PPP-MCNC: 651 MG/DL (ref 200–420)
FLUAV H1 2009 PAND RNA SPEC QL NAA+PROBE: NEGATIVE
FLUAV H1 RNA SPEC QL NAA+PROBE: NEGATIVE
FLUAV H3 RNA SPEC QL NAA+PROBE: NEGATIVE
FLUAV RNA SPEC QL NAA+PROBE: NEGATIVE
FLUBV RNA SPEC QL NAA+PROBE: NEGATIVE
GFR SERPL CREATININE-BSD FRML MDRD: 43 ML/MIN/1.7M2
GFR SERPL CREATININE-BSD FRML MDRD: 44 ML/MIN/1.7M2
GFR SERPL CREATININE-BSD FRML MDRD: 44 ML/MIN/1.7M2
GFR SERPL CREATININE-BSD FRML MDRD: 45 ML/MIN/1.7M2
GFR SERPL CREATININE-BSD FRML MDRD: 52 ML/MIN/1.7M2
GLUCOSE BLDC GLUCOMTR-MCNC: 103 MG/DL (ref 70–99)
GLUCOSE BLDC GLUCOMTR-MCNC: 105 MG/DL (ref 70–99)
GLUCOSE BLDC GLUCOMTR-MCNC: 106 MG/DL (ref 70–99)
GLUCOSE BLDC GLUCOMTR-MCNC: 120 MG/DL (ref 70–99)
GLUCOSE BLDC GLUCOMTR-MCNC: 123 MG/DL (ref 70–99)
GLUCOSE BLDC GLUCOMTR-MCNC: 130 MG/DL (ref 70–99)
GLUCOSE BLDC GLUCOMTR-MCNC: 131 MG/DL (ref 70–99)
GLUCOSE BLDC GLUCOMTR-MCNC: 132 MG/DL (ref 70–99)
GLUCOSE BLDC GLUCOMTR-MCNC: 134 MG/DL (ref 70–99)
GLUCOSE BLDC GLUCOMTR-MCNC: 144 MG/DL (ref 70–99)
GLUCOSE BLDC GLUCOMTR-MCNC: 147 MG/DL (ref 70–99)
GLUCOSE BLDC GLUCOMTR-MCNC: 179 MG/DL (ref 70–99)
GLUCOSE BLDC GLUCOMTR-MCNC: 183 MG/DL (ref 70–99)
GLUCOSE SERPL-MCNC: 122 MG/DL (ref 70–99)
GLUCOSE SERPL-MCNC: 142 MG/DL (ref 70–99)
GLUCOSE SERPL-MCNC: 144 MG/DL (ref 70–99)
GLUCOSE SERPL-MCNC: 157 MG/DL (ref 70–99)
GLUCOSE SERPL-MCNC: 193 MG/DL (ref 70–99)
HADV DNA SPEC QL NAA+PROBE: NEGATIVE
HADV DNA SPEC QL NAA+PROBE: NEGATIVE
HBA1C MFR BLD: 5.3 % (ref 4.3–6)
HCO3 BLD-SCNC: 23 MMOL/L (ref 21–28)
HCO3 BLD-SCNC: 24 MMOL/L (ref 21–28)
HCO3 BLD-SCNC: 25 MMOL/L (ref 21–28)
HCO3 BLD-SCNC: 26 MMOL/L (ref 21–28)
HCO3 BLDA-SCNC: 24 MMOL/L (ref 21–28)
HCO3 BLDA-SCNC: 24 MMOL/L (ref 21–28)
HCO3 BLDV-SCNC: 25 MMOL/L (ref 21–28)
HCO3 BLDV-SCNC: 26 MMOL/L (ref 21–28)
HCT VFR BLD AUTO: 23.1 % (ref 35–47)
HCT VFR BLD AUTO: 24.4 % (ref 35–47)
HCT VFR BLD AUTO: 26.7 % (ref 35–47)
HCT VFR BLD AUTO: 30.3 % (ref 35–47)
HGB BLD-MCNC: 7.5 G/DL (ref 11.7–15.7)
HGB BLD-MCNC: 8 G/DL (ref 11.7–15.7)
HGB BLD-MCNC: 8.7 G/DL (ref 11.7–15.7)
HGB BLD-MCNC: 9.6 G/DL (ref 11.7–15.7)
HGB FREE PLAS-MCNC: <30 MG/DL
HMPV RNA SPEC QL NAA+PROBE: NEGATIVE
HPIV1 RNA SPEC QL NAA+PROBE: NEGATIVE
HPIV2 RNA SPEC QL NAA+PROBE: NEGATIVE
HPIV3 RNA SPEC QL NAA+PROBE: NEGATIVE
IMM GRANULOCYTES # BLD: 0.1 10E9/L (ref 0–0.4)
IMM GRANULOCYTES NFR BLD: 0.5 %
INR PPP: 1.52 (ref 0.86–1.14)
INR PPP: 1.52 (ref 0.86–1.14)
INR PPP: 1.58 (ref 0.86–1.14)
INR PPP: 1.58 (ref 0.86–1.14)
INTERPRETATION ECG - MUSE: NORMAL
KCT BLD-ACNC: 144 SEC (ref 75–150)
KCT BLD-ACNC: 148 SEC (ref 75–150)
KCT BLD-ACNC: 148 SEC (ref 75–150)
KCT BLD-ACNC: 152 SEC (ref 75–150)
KCT BLD-ACNC: 156 SEC (ref 75–150)
KCT BLD-ACNC: 156 SEC (ref 75–150)
KCT BLD-ACNC: 160 SEC (ref 75–150)
KCT BLD-ACNC: 160 SEC (ref 75–150)
KCT BLD-ACNC: 164 SEC (ref 75–150)
KCT BLD-ACNC: 168 SEC (ref 75–150)
KCT BLD-ACNC: 180 SEC (ref 75–150)
KCT BLD-ACNC: 217 SEC (ref 75–150)
KCT BLD-ACNC: 245 SEC (ref 75–150)
LACTATE BLD-SCNC: 0.8 MMOL/L (ref 0.7–2)
LACTATE BLD-SCNC: 1.5 MMOL/L (ref 0.7–2)
LACTATE BLD-SCNC: 1.8 MMOL/L (ref 0.7–2)
LACTATE BLD-SCNC: 3.8 MMOL/L (ref 0.7–2)
LMWH PPP CHRO-ACNC: 0.28 IU/ML
LMWH PPP CHRO-ACNC: 0.46 IU/ML
LMWH PPP CHRO-ACNC: 0.49 IU/ML
LMWH PPP CHRO-ACNC: 0.52 IU/ML
LYMPHOCYTES # BLD AUTO: 1.2 10E9/L (ref 0.8–5.3)
LYMPHOCYTES NFR BLD AUTO: 6.3 %
MACROCYTES BLD QL SMEAR: PRESENT
MAGNESIUM SERPL-MCNC: 1.5 MG/DL (ref 1.6–2.3)
MAGNESIUM SERPL-MCNC: 2.8 MG/DL (ref 1.6–2.3)
MAGNESIUM SERPL-MCNC: 3 MG/DL (ref 1.6–2.3)
MCH RBC QN AUTO: 33 PG (ref 26.5–33)
MCH RBC QN AUTO: 33.2 PG (ref 26.5–33)
MCH RBC QN AUTO: 33.5 PG (ref 26.5–33)
MCH RBC QN AUTO: 33.6 PG (ref 26.5–33)
MCHC RBC AUTO-ENTMCNC: 31.7 G/DL (ref 31.5–36.5)
MCHC RBC AUTO-ENTMCNC: 32.5 G/DL (ref 31.5–36.5)
MCHC RBC AUTO-ENTMCNC: 32.6 G/DL (ref 31.5–36.5)
MCHC RBC AUTO-ENTMCNC: 32.8 G/DL (ref 31.5–36.5)
MCV RBC AUTO: 102 FL (ref 78–100)
MCV RBC AUTO: 102 FL (ref 78–100)
MCV RBC AUTO: 103 FL (ref 78–100)
MCV RBC AUTO: 104 FL (ref 78–100)
MICROBIOLOGIST REVIEW: NORMAL
MONOCYTES # BLD AUTO: 0.9 10E9/L (ref 0–1.3)
MONOCYTES NFR BLD AUTO: 4.4 %
MRSA DNA SPEC QL NAA+PROBE: NEGATIVE
NEUTROPHILS # BLD AUTO: 17.2 10E9/L (ref 1.6–8.3)
NEUTROPHILS NFR BLD AUTO: 88.7 %
NRBC # BLD AUTO: 0 10*3/UL
NRBC BLD AUTO-RTO: 0 /100
O2/TOTAL GAS SETTING VFR VENT: 40 %
O2/TOTAL GAS SETTING VFR VENT: 50 %
O2/TOTAL GAS SETTING VFR VENT: ABNORMAL %
OXYHGB MFR BLD: 96 % (ref 92–100)
OXYHGB MFR BLD: 97 % (ref 92–100)
OXYHGB MFR BLDA: 97 % (ref 75–100)
OXYHGB MFR BLDA: 97 % (ref 75–100)
OXYHGB MFR BLDV: 74 %
OXYHGB MFR BLDV: 79 %
PCO2 BLD: 33 MM HG (ref 35–45)
PCO2 BLD: 34 MM HG (ref 35–45)
PCO2 BLD: 35 MM HG (ref 35–45)
PCO2 BLD: 36 MM HG (ref 35–45)
PCO2 BLD: 36 MM HG (ref 35–45)
PCO2 BLD: 38 MM HG (ref 35–45)
PCO2 BLD: 38 MM HG (ref 35–45)
PCO2 BLDA: 33 MM HG (ref 35–45)
PCO2 BLDA: 39 MM HG (ref 35–45)
PCO2 BLDV: 35 MM HG (ref 40–50)
PCO2 BLDV: 42 MM HG (ref 40–50)
PH BLD: 7.39 PH (ref 7.35–7.45)
PH BLD: 7.43 PH (ref 7.35–7.45)
PH BLD: 7.43 PH (ref 7.35–7.45)
PH BLD: 7.47 PH (ref 7.35–7.45)
PH BLD: 7.48 PH (ref 7.35–7.45)
PH BLD: 7.49 PH (ref 7.35–7.45)
PH BLD: 7.49 PH (ref 7.35–7.45)
PH BLD: 7.5 PH (ref 7.35–7.45)
PH BLDA: 7.41 PH (ref 7.35–7.45)
PH BLDA: 7.47 PH (ref 7.35–7.45)
PH BLDV: 7.39 PH (ref 7.32–7.43)
PH BLDV: 7.46 PH (ref 7.32–7.43)
PHOSPHATE SERPL-MCNC: 3.7 MG/DL (ref 2.5–4.5)
PHOSPHATE SERPL-MCNC: 3.9 MG/DL (ref 2.5–4.5)
PLATELET # BLD AUTO: 180 10E9/L (ref 150–450)
PLATELET # BLD AUTO: 202 10E9/L (ref 150–450)
PLATELET # BLD AUTO: 205 10E9/L (ref 150–450)
PLATELET # BLD AUTO: 242 10E9/L (ref 150–450)
PLATELET # BLD EST: ABNORMAL 10*3/UL
PO2 BLD: 102 MM HG (ref 80–105)
PO2 BLD: 102 MM HG (ref 80–105)
PO2 BLD: 105 MM HG (ref 80–105)
PO2 BLD: 113 MM HG (ref 80–105)
PO2 BLD: 134 MM HG (ref 80–105)
PO2 BLD: 136 MM HG (ref 80–105)
PO2 BLD: 139 MM HG (ref 80–105)
PO2 BLD: 160 MM HG (ref 80–105)
PO2 BLD: 185 MM HG (ref 80–105)
PO2 BLD: 210 MM HG (ref 80–105)
PO2 BLD: 238 MM HG (ref 80–105)
PO2 BLDA: 254 MM HG (ref 80–105)
PO2 BLDA: 272 MM HG (ref 80–105)
PO2 BLDV: 42 MM HG (ref 25–47)
PO2 BLDV: 44 MM HG (ref 25–47)
POTASSIUM SERPL-SCNC: 3.8 MMOL/L (ref 3.4–5.3)
POTASSIUM SERPL-SCNC: 4 MMOL/L (ref 3.4–5.3)
POTASSIUM SERPL-SCNC: 4.1 MMOL/L (ref 3.4–5.3)
POTASSIUM SERPL-SCNC: 4.2 MMOL/L (ref 3.4–5.3)
POTASSIUM SERPL-SCNC: 4.4 MMOL/L (ref 3.4–5.3)
RBC # BLD AUTO: 2.26 10E12/L (ref 3.8–5.2)
RBC # BLD AUTO: 2.39 10E12/L (ref 3.8–5.2)
RBC # BLD AUTO: 2.59 10E12/L (ref 3.8–5.2)
RBC # BLD AUTO: 2.91 10E12/L (ref 3.8–5.2)
RHINOVIRUS RNA SPEC QL NAA+PROBE: NEGATIVE
RSV RNA SPEC QL NAA+PROBE: NEGATIVE
RSV RNA SPEC QL NAA+PROBE: NEGATIVE
SODIUM SERPL-SCNC: 139 MMOL/L (ref 133–144)
SODIUM SERPL-SCNC: 140 MMOL/L (ref 133–144)
SODIUM SERPL-SCNC: 141 MMOL/L (ref 133–144)
SPECIMEN SOURCE: NORMAL
SPECIMEN SOURCE: NORMAL
WBC # BLD AUTO: 12.1 10E9/L (ref 4–11)
WBC # BLD AUTO: 15 10E9/L (ref 4–11)
WBC # BLD AUTO: 15.2 10E9/L (ref 4–11)
WBC # BLD AUTO: 17.4 10E9/L (ref 4–11)

## 2018-02-28 PROCEDURE — 25000131 ZZH RX MED GY IP 250 OP 636 PS 637: Performed by: STUDENT IN AN ORGANIZED HEALTH CARE EDUCATION/TRAINING PROGRAM

## 2018-02-28 PROCEDURE — 44500 INTRO GASTROINTESTINAL TUBE: CPT | Performed by: DIETITIAN, REGISTERED

## 2018-02-28 PROCEDURE — P9041 ALBUMIN (HUMAN),5%, 50ML: HCPCS | Performed by: SURGERY

## 2018-02-28 PROCEDURE — 25000132 ZZH RX MED GY IP 250 OP 250 PS 637: Performed by: STUDENT IN AN ORGANIZED HEALTH CARE EDUCATION/TRAINING PROGRAM

## 2018-02-28 PROCEDURE — 40000986 XR ABDOMEN PORT 1 VW

## 2018-02-28 PROCEDURE — 40000014 ZZH STATISTIC ARTERIAL MONITORING DAILY

## 2018-02-28 PROCEDURE — 25000128 H RX IP 250 OP 636: Performed by: STUDENT IN AN ORGANIZED HEALTH CARE EDUCATION/TRAINING PROGRAM

## 2018-02-28 PROCEDURE — 85730 THROMBOPLASTIN TIME PARTIAL: CPT | Performed by: INTERNAL MEDICINE

## 2018-02-28 PROCEDURE — 33949 ECMO/ECLS DAILY MGMT ARTERY: CPT

## 2018-02-28 PROCEDURE — 25000131 ZZH RX MED GY IP 250 OP 636 PS 637: Performed by: INTERNAL MEDICINE

## 2018-02-28 PROCEDURE — P9016 RBC LEUKOCYTES REDUCED: HCPCS | Performed by: INTERNAL MEDICINE

## 2018-02-28 PROCEDURE — 40000275 ZZH STATISTIC RCP TIME EA 10 MIN

## 2018-02-28 PROCEDURE — 25000128 H RX IP 250 OP 636: Performed by: INTERNAL MEDICINE

## 2018-02-28 PROCEDURE — 25000125 ZZHC RX 250: Performed by: SURGERY

## 2018-02-28 PROCEDURE — 85730 THROMBOPLASTIN TIME PARTIAL: CPT | Performed by: SURGERY

## 2018-02-28 PROCEDURE — 27210995 ZZH RX 272: Performed by: STUDENT IN AN ORGANIZED HEALTH CARE EDUCATION/TRAINING PROGRAM

## 2018-02-28 PROCEDURE — 71045 X-RAY EXAM CHEST 1 VIEW: CPT

## 2018-02-28 PROCEDURE — 36415 COLL VENOUS BLD VENIPUNCTURE: CPT | Performed by: INTERNAL MEDICINE

## 2018-02-28 PROCEDURE — 25000128 H RX IP 250 OP 636: Performed by: SURGERY

## 2018-02-28 PROCEDURE — 85027 COMPLETE CBC AUTOMATED: CPT | Performed by: INTERNAL MEDICINE

## 2018-02-28 PROCEDURE — 99291 CRITICAL CARE FIRST HOUR: CPT | Mod: GC | Performed by: ANESTHESIOLOGY

## 2018-02-28 PROCEDURE — 83051 HEMOGLOBIN PLASMA: CPT | Performed by: INTERNAL MEDICINE

## 2018-02-28 PROCEDURE — G0463 HOSPITAL OUTPT CLINIC VISIT: HCPCS

## 2018-02-28 PROCEDURE — 27210136 ZZH KIT CATH ARTERIAL EXT SUPPLY

## 2018-02-28 PROCEDURE — 25000128 H RX IP 250 OP 636

## 2018-02-28 PROCEDURE — 85610 PROTHROMBIN TIME: CPT | Performed by: SURGERY

## 2018-02-28 PROCEDURE — 83605 ASSAY OF LACTIC ACID: CPT | Performed by: SURGERY

## 2018-02-28 PROCEDURE — 83735 ASSAY OF MAGNESIUM: CPT | Performed by: SURGERY

## 2018-02-28 PROCEDURE — 87040 BLOOD CULTURE FOR BACTERIA: CPT | Performed by: INTERNAL MEDICINE

## 2018-02-28 PROCEDURE — 86900 BLOOD TYPING SEROLOGIC ABO: CPT | Performed by: INTERNAL MEDICINE

## 2018-02-28 PROCEDURE — 36620 INSERTION CATHETER ARTERY: CPT | Mod: GC | Performed by: ANESTHESIOLOGY

## 2018-02-28 PROCEDURE — 82330 ASSAY OF CALCIUM: CPT | Performed by: SURGERY

## 2018-02-28 PROCEDURE — 85520 HEPARIN ASSAY: CPT | Performed by: INTERNAL MEDICINE

## 2018-02-28 PROCEDURE — 85384 FIBRINOGEN ACTIVITY: CPT | Performed by: SURGERY

## 2018-02-28 PROCEDURE — 0DH67UZ INSERTION OF FEEDING DEVICE INTO STOMACH, VIA NATURAL OR ARTIFICIAL OPENING: ICD-10-PCS | Performed by: SURGERY

## 2018-02-28 PROCEDURE — 85300 ANTITHROMBIN III ACTIVITY: CPT | Performed by: INTERNAL MEDICINE

## 2018-02-28 PROCEDURE — 84100 ASSAY OF PHOSPHORUS: CPT | Performed by: SURGERY

## 2018-02-28 PROCEDURE — 84460 ALANINE AMINO (ALT) (SGPT): CPT | Performed by: INTERNAL MEDICINE

## 2018-02-28 PROCEDURE — 85027 COMPLETE CBC AUTOMATED: CPT | Performed by: SURGERY

## 2018-02-28 PROCEDURE — 20000004 ZZH R&B ICU UMMC

## 2018-02-28 PROCEDURE — 94645 CONT INHLJ TX EACH ADDL HOUR: CPT

## 2018-02-28 PROCEDURE — 36620 INSERTION CATHETER ARTERY: CPT

## 2018-02-28 PROCEDURE — 82805 BLOOD GASES W/O2 SATURATION: CPT | Performed by: SURGERY

## 2018-02-28 PROCEDURE — 85384 FIBRINOGEN ACTIVITY: CPT | Performed by: INTERNAL MEDICINE

## 2018-02-28 PROCEDURE — 99223 1ST HOSP IP/OBS HIGH 75: CPT | Performed by: CLINICAL NURSE SPECIALIST

## 2018-02-28 PROCEDURE — 80069 RENAL FUNCTION PANEL: CPT | Performed by: INTERNAL MEDICINE

## 2018-02-28 PROCEDURE — 94640 AIRWAY INHALATION TREATMENT: CPT

## 2018-02-28 PROCEDURE — P9041 ALBUMIN (HUMAN),5%, 50ML: HCPCS

## 2018-02-28 PROCEDURE — 85379 FIBRIN DEGRADATION QUANT: CPT | Performed by: SURGERY

## 2018-02-28 PROCEDURE — 85379 FIBRIN DEGRADATION QUANT: CPT | Performed by: INTERNAL MEDICINE

## 2018-02-28 PROCEDURE — 36556 INSERT NON-TUNNEL CV CATH: CPT | Mod: GC | Performed by: ANESTHESIOLOGY

## 2018-02-28 PROCEDURE — 94003 VENT MGMT INPAT SUBQ DAY: CPT

## 2018-02-28 PROCEDURE — 86850 RBC ANTIBODY SCREEN: CPT | Performed by: INTERNAL MEDICINE

## 2018-02-28 PROCEDURE — 25000125 ZZHC RX 250: Performed by: ANESTHESIOLOGY

## 2018-02-28 PROCEDURE — 85347 COAGULATION TIME ACTIVATED: CPT

## 2018-02-28 PROCEDURE — 86901 BLOOD TYPING SEROLOGIC RH(D): CPT | Performed by: INTERNAL MEDICINE

## 2018-02-28 PROCEDURE — 00000146 ZZHCL STATISTIC GLUCOSE BY METER IP

## 2018-02-28 PROCEDURE — 85520 HEPARIN ASSAY: CPT | Performed by: SURGERY

## 2018-02-28 PROCEDURE — 83735 ASSAY OF MAGNESIUM: CPT | Performed by: INTERNAL MEDICINE

## 2018-02-28 PROCEDURE — 94799 UNLISTED PULMONARY SVC/PX: CPT

## 2018-02-28 PROCEDURE — 25000125 ZZHC RX 250: Performed by: STUDENT IN AN ORGANIZED HEALTH CARE EDUCATION/TRAINING PROGRAM

## 2018-02-28 PROCEDURE — 80048 BASIC METABOLIC PNL TOTAL CA: CPT | Performed by: SURGERY

## 2018-02-28 PROCEDURE — 40000986 XR CHEST PORT 1 VW

## 2018-02-28 PROCEDURE — 85610 PROTHROMBIN TIME: CPT | Performed by: INTERNAL MEDICINE

## 2018-02-28 RX ORDER — POTASSIUM CL/LIDO/0.9 % NACL 10MEQ/0.1L
10 INTRAVENOUS SOLUTION, PIGGYBACK (ML) INTRAVENOUS
Status: DISCONTINUED | OUTPATIENT
Start: 2018-02-28 | End: 2018-03-01

## 2018-02-28 RX ORDER — POTASSIUM CHLORIDE 29.8 MG/ML
20 INJECTION INTRAVENOUS
Status: DISCONTINUED | OUTPATIENT
Start: 2018-02-28 | End: 2018-03-01

## 2018-02-28 RX ORDER — ALBUMIN, HUMAN INJ 5% 5 %
SOLUTION INTRAVENOUS
Status: COMPLETED
Start: 2018-02-28 | End: 2018-02-28

## 2018-02-28 RX ORDER — ALBUMIN, HUMAN INJ 5% 5 %
SOLUTION INTRAVENOUS
Status: DISCONTINUED
Start: 2018-02-28 | End: 2018-03-01 | Stop reason: HOSPADM

## 2018-02-28 RX ORDER — POTASSIUM CHLORIDE 750 MG/1
20-40 TABLET, EXTENDED RELEASE ORAL
Status: DISCONTINUED | OUTPATIENT
Start: 2018-02-28 | End: 2018-03-01

## 2018-02-28 RX ORDER — ALBUMIN, HUMAN INJ 5% 5 %
12.5 SOLUTION INTRAVENOUS ONCE
Status: COMPLETED | OUTPATIENT
Start: 2018-02-28 | End: 2018-02-28

## 2018-02-28 RX ORDER — POTASSIUM CHLORIDE 1.5 G/1.58G
20-40 POWDER, FOR SOLUTION ORAL
Status: DISCONTINUED | OUTPATIENT
Start: 2018-02-28 | End: 2018-03-01

## 2018-02-28 RX ORDER — FAMOTIDINE 40 MG/5ML
20 POWDER, FOR SUSPENSION ORAL DAILY
Status: DISCONTINUED | OUTPATIENT
Start: 2018-03-01 | End: 2018-03-01

## 2018-02-28 RX ORDER — VANCOMYCIN HYDROCHLORIDE 1 G/200ML
1000 INJECTION, SOLUTION INTRAVENOUS EVERY 24 HOURS
Status: DISCONTINUED | OUTPATIENT
Start: 2018-03-01 | End: 2018-02-28

## 2018-02-28 RX ORDER — VANCOMYCIN HYDROCHLORIDE 1 G/200ML
1000 INJECTION, SOLUTION INTRAVENOUS EVERY 24 HOURS
Status: DISCONTINUED | OUTPATIENT
Start: 2018-02-28 | End: 2018-03-01

## 2018-02-28 RX ORDER — POTASSIUM CHLORIDE 7.45 MG/ML
10 INJECTION INTRAVENOUS
Status: DISCONTINUED | OUTPATIENT
Start: 2018-02-28 | End: 2018-03-01

## 2018-02-28 RX ORDER — ALBUMIN, HUMAN INJ 5% 5 %
25 SOLUTION INTRAVENOUS ONCE
Status: COMPLETED | OUTPATIENT
Start: 2018-02-28 | End: 2018-02-28

## 2018-02-28 RX ORDER — MAGNESIUM SULFATE HEPTAHYDRATE 40 MG/ML
4 INJECTION, SOLUTION INTRAVENOUS EVERY 4 HOURS PRN
Status: DISCONTINUED | OUTPATIENT
Start: 2018-02-28 | End: 2018-03-01

## 2018-02-28 RX ADMIN — MINERAL OIL AND WHITE PETROLATUM: 150; 830 OINTMENT OPHTHALMIC at 10:10

## 2018-02-28 RX ADMIN — SULFAMETHOXAZOLE AND TRIMETHOPRIM 1 TABLET: 800; 160 TABLET ORAL at 07:59

## 2018-02-28 RX ADMIN — POTASSIUM CHLORIDE 20 MEQ: 29.8 INJECTION, SOLUTION INTRAVENOUS at 11:04

## 2018-02-28 RX ADMIN — TACROLIMUS 4 MG: 5 CAPSULE ORAL at 18:08

## 2018-02-28 RX ADMIN — NYSTATIN 500000 UNITS: 100000 SUSPENSION ORAL at 16:53

## 2018-02-28 RX ADMIN — EPOPROSTENOL 20 NG/KG/MIN: 1.5 INJECTION, POWDER, LYOPHILIZED, FOR SOLUTION INTRAVENOUS at 11:38

## 2018-02-28 RX ADMIN — MAGNESIUM SULFATE IN WATER 4 G: 40 INJECTION, SOLUTION INTRAVENOUS at 05:25

## 2018-02-28 RX ADMIN — VANCOMYCIN HYDROCHLORIDE 1000 MG: 1 INJECTION, SOLUTION INTRAVENOUS at 21:03

## 2018-02-28 RX ADMIN — HYDROCORTISONE SODIUM SUCCINATE 50 MG: 100 INJECTION, POWDER, FOR SOLUTION INTRAMUSCULAR; INTRAVENOUS at 22:26

## 2018-02-28 RX ADMIN — PIPERACILLIN AND TAZOBACTAM 3.38 G: 3; .375 INJECTION, POWDER, LYOPHILIZED, FOR SOLUTION INTRAVENOUS; PARENTERAL at 12:05

## 2018-02-28 RX ADMIN — HYDROCORTISONE SODIUM SUCCINATE 50 MG: 100 INJECTION, POWDER, FOR SOLUTION INTRAMUSCULAR; INTRAVENOUS at 10:10

## 2018-02-28 RX ADMIN — LIDOCAINE HYDROCHLORIDE 5 ML: 20 SOLUTION ORAL; TOPICAL at 11:03

## 2018-02-28 RX ADMIN — NYSTATIN 500000 UNITS: 100000 SUSPENSION ORAL at 07:59

## 2018-02-28 RX ADMIN — Medication 75 MG: at 08:00

## 2018-02-28 RX ADMIN — NYSTATIN 500000 UNITS: 100000 SUSPENSION ORAL at 12:05

## 2018-02-28 RX ADMIN — HUMAN INSULIN 0.5 UNITS/HR: 100 INJECTION, SOLUTION SUBCUTANEOUS at 18:26

## 2018-02-28 RX ADMIN — HYDROCORTISONE SODIUM SUCCINATE 50 MG: 100 INJECTION, POWDER, FOR SOLUTION INTRAMUSCULAR; INTRAVENOUS at 16:53

## 2018-02-28 RX ADMIN — TACROLIMUS 4 MG: 1 CAPSULE ORAL at 07:59

## 2018-02-28 RX ADMIN — MINERAL OIL AND WHITE PETROLATUM: 150; 830 OINTMENT OPHTHALMIC at 16:54

## 2018-02-28 RX ADMIN — EPOPROSTENOL 20 NG/KG/MIN: 1.5 INJECTION, POWDER, LYOPHILIZED, FOR SOLUTION INTRAVENOUS at 04:21

## 2018-02-28 RX ADMIN — VANCOMYCIN HYDROCHLORIDE 1000 MG: 1 INJECTION, SOLUTION INTRAVENOUS at 00:49

## 2018-02-28 RX ADMIN — POTASSIUM CHLORIDE 20 MEQ: 29.8 INJECTION, SOLUTION INTRAVENOUS at 23:23

## 2018-02-28 RX ADMIN — IPRATROPIUM BROMIDE AND ALBUTEROL SULFATE 3 ML: .5; 3 SOLUTION RESPIRATORY (INHALATION) at 07:46

## 2018-02-28 RX ADMIN — MINERAL OIL AND WHITE PETROLATUM: 150; 830 OINTMENT OPHTHALMIC at 04:29

## 2018-02-28 RX ADMIN — FAMOTIDINE 20 MG: 40 POWDER, FOR SUSPENSION ORAL at 08:01

## 2018-02-28 RX ADMIN — PIPERACILLIN AND TAZOBACTAM 3.38 G: 3; .375 INJECTION, POWDER, LYOPHILIZED, FOR SOLUTION INTRAVENOUS; PARENTERAL at 16:56

## 2018-02-28 RX ADMIN — Medication 75 MG: at 18:08

## 2018-02-28 RX ADMIN — HYDROCORTISONE SODIUM SUCCINATE 50 MG: 100 INJECTION, POWDER, FOR SOLUTION INTRAMUSCULAR; INTRAVENOUS at 04:27

## 2018-02-28 RX ADMIN — ALBUMIN HUMAN 12.5 G: 0.05 INJECTION, SOLUTION INTRAVENOUS at 02:38

## 2018-02-28 RX ADMIN — PIPERACILLIN AND TAZOBACTAM 3.38 G: 3; .375 INJECTION, POWDER, LYOPHILIZED, FOR SOLUTION INTRAVENOUS; PARENTERAL at 06:00

## 2018-02-28 RX ADMIN — SODIUM CHLORIDE, PRESERVATIVE FREE 520 UNITS: 5 INJECTION INTRAVENOUS at 05:28

## 2018-02-28 RX ADMIN — NYSTATIN 500000 UNITS: 100000 SUSPENSION ORAL at 19:45

## 2018-02-28 RX ADMIN — PIPERACILLIN AND TAZOBACTAM 3.38 G: 3; .375 INJECTION, POWDER, LYOPHILIZED, FOR SOLUTION INTRAVENOUS; PARENTERAL at 00:15

## 2018-02-28 RX ADMIN — HEPARIN SODIUM 32.44 UNITS/KG/HR: 10000 INJECTION, SOLUTION INTRAVENOUS at 19:46

## 2018-02-28 RX ADMIN — ALBUMIN HUMAN 25 G: 0.05 INJECTION, SOLUTION INTRAVENOUS at 05:13

## 2018-02-28 RX ADMIN — ALBUMIN HUMAN 12.5 G: 50 SOLUTION INTRAVENOUS at 02:38

## 2018-02-28 RX ADMIN — HUMAN INSULIN 2 UNITS/HR: 100 INJECTION, SOLUTION SUBCUTANEOUS at 00:23

## 2018-02-28 RX ADMIN — HEPARIN SODIUM 500 UNITS/HR: 10000 INJECTION, SOLUTION INTRAVENOUS at 01:00

## 2018-02-28 RX ADMIN — Medication 100 MCG/HR: at 14:17

## 2018-02-28 RX ADMIN — EPOPROSTENOL 20 NG/KG/MIN: 1.5 INJECTION, POWDER, LYOPHILIZED, FOR SOLUTION INTRAVENOUS at 19:46

## 2018-02-28 RX ADMIN — MINERAL OIL AND WHITE PETROLATUM: 150; 830 OINTMENT OPHTHALMIC at 22:26

## 2018-02-28 RX ADMIN — Medication 5 MG/HR: at 00:51

## 2018-02-28 NOTE — PROCEDURES
"Insert arterial line  Date/Time: 2/28/2018 3:30 PM  Performed by: REYMUNDO ORDOÑEZ  Authorized by: MARIA DOLORES BRAR   Consent: Verbal consent obtained. Written consent obtained.  Risks and benefits: risks, benefits and alternatives were discussed  Consent given by: spouse  Patient understanding: spouse states understanding of the procedure being performed  Patient consent: the spouse's understanding of the procedure matches consent given  Procedure consent: procedure consent matches procedure scheduled  Relevant documents: relevant documents present and verified  Patient identity confirmed: arm band  Time out: Immediately prior to procedure a \"time out\" was called to verify the correct patient, procedure, equipment, support staff and site/side marked as required.  Preparation: Patient was prepped and draped in the usual sterile fashion.  Indications: multiple ABGs, respiratory failure and hemodynamic monitoring  Location: left radial     Sedation:  Patient sedated: yes  Sedation type: sedation unchanged from that for ETT comfort  Sedatives: see MAR for details  Analgesia: see MAR for details  Number of attempts: 1  Post-procedure: line sutured and dressing applied  Post-procedure CMS: normal  Patient tolerance: Patient tolerated the procedure well with no immediate complications  Comments: Dr. BRAR was present during critical portions of the procedure. Requested movement of arterial line per surgical team.  "

## 2018-02-28 NOTE — PHARMACY-VANCOMYCIN DOSING SERVICE
Pharmacy Empiric Dose Change Per Policy - Vancomycin    Original Dose Ordered: vancomcyin 1000 mg IV q18h  Dose Changed To: vancomycin 1000 mg IV q24h    This dose change was based on the pharmacist's assessment of this patient's age, weight, concurrent drug therapy, treatment goals, whether patient's creatinine clearance adequately indicates renal function (factoring in age, muscle mass, fluid and clinical status), and, if applicable, prior pharmacokinetic data.    Estimated Creatinine Clearance: 40.9 mL/min (based on Cr of 1.26).  Will continue to follow and modify dosage according to levels, organ function and clinical condition    Jailyn Mane, PharmD, [facility]  Pager [phone]

## 2018-02-28 NOTE — PROCEDURES
Bridle Placement:   Reason for bridle placement: securement of FT   Medicine delivered during procedure: lubricating jelly  Procedure: Successful   Location of top of clip on FT: @ 85 cm marker   Condition of nose/skin at time of bridle placement: Unremarkable   Face to Face time with patient: <5 minutes.      Natalia Sargent RD, LD, [facility]  CVICU Dietitian  Pager: [phone]

## 2018-02-28 NOTE — PROGRESS NOTES
LAS score update.  LAS score updated based on current status of requiring ventilator and patient on ECMO.  ECMO is reflected in LAS score as requiring 100% oxygen which was true at time of transplant listing.  LAS score is 88.7281.

## 2018-02-28 NOTE — OP NOTE
DATE OF SERVICE:  2/27/2018.     PREOPERATIVE DIAGNOSES:   1.  Acute hypoxic respiratory failure.  2.  Severe right heart failure causing cardiogenic shock.  3.  Severe end-stage life-threatening lung disease.   4.  Dermatomyositis.  5.  Rheumatoid arthritis.  6.  Interstitial lung disease.     POSTOPERATIVE DIAGNOSES:   1.  Acute hypoxic respiratory failure.  2.  Severe right heart failure causing cardiogenic shock.  3.  Severe end-stage life-threatening lung disease.   4.  Dermatomyositis.  5.  Rheumatoid arthritis.  6.  Interstitial lung disease.     PROCEDURE PERFORMED:   Peripheral cnnulation for arteriovenous extracorporeal membrane oxygenation.    SURGEON:  Toby Hernandez MD, PhD      CO-SURGEON:  Jacinto Ackerman MD      ANESTHESIA:  General endotracheal anesthesia.      ESTIMATED BLOOD LOSS:  50 mL.      SPECIMENS: None.      INDICATIONS FOR PROCEDURE:  Ms. Blue is a 57-year-old woman with severe end-stage life threatening lung disease who has had significant deterioration in her respiratory status. She now has right heart failure and unresponsive to high dose inotropes, Flolan and Alan.  Her family understands the risks and benefits of the procedure and wishes for her to undergo the operation.  She has been evaluated by a multidisciplinary transplant team, and she has been found to be an appropriate candidate for lung transplant.  Her family understands that if she does not receive a lung transplant, there is virtually no chance that she will survive this illness.     OPERATIVE FINDINGS:  After initiating VA ECMO, she quickly weaned off of all vasoactive medications.    PROCEDURE IN DETAIL: The patient was too unstable to transport, so the procedure was performed in the surgical intensive care unit. Under general anesthesia, a transesophageal echocardiography probe was placed to guide placement of the venous cannula. The patient was prepped and draped in the supine position. Proximal and distal  control of the right subclavian artery was established through a right infraclavicular incision after administering 10,000 units of intravenous heparin. A beveled 8 mm Gelweave graaft was sewn to the right subclavian artery in end-to-side fashion using running continuous 6-0 Prolene, and buttressing the artery with strips of bovine pericardium. BioGlue was applied to the anastomosis. The Gelweave graft was de-aired and connected to the arterial line of the extracorporeal membrane oxygenator circuit.     A 19 Fr. right femoral venous cannula was placed using Seldinger technique under transesophageal echocardiographic guidance. This was de-aired and connected to the venous line of the extracorporeal membrane oxygenator circuit. Extracorporeal membrane oxygenation was initiated. The right infraclavicular incision was closed in layers with absorbable suture around the Gelweave graft. Sterile dressings were applied. A left femoral arterial line was placed using Seldinger technique. There were no immediate complications.    ELEANOR SANCHEZ MD

## 2018-02-28 NOTE — PROGRESS NOTES
CVICU PROGRESS NOTE  February 28, 2018      CO-MORBIDITIES:   Hypotension, unspecified hypotension type  (primary encounter diagnosis)  Organ transplant candidate  Lung disease, interstitial (H)  Abnormal chest CT  Acute on chronic respiratory failure with hypoxia (H)  ILD (interstitial lung disease) (H)    ASSESSMENT: Kecia Blue is a 57 year old female with PMH significant for dermatomyositis on immunosuppression, eronegative RA, ILD, and pulmonary hypertension, who was admitted for emergent lung transplant work-up on 2/21 for increasing shortness of breath and hypoxia now POD # 1 s/p emergent VA ECMO placement on 2/27/18. Working to wean nitric and flolan as able.    PLAN:  Neuro/ pain/ sedation:  - Monitor neurological status. Notify the MD for any acute changes in exam.  - Fentanyl gtt for pain.  - Midazolam gtt for sedation.    Pulmonary care:  # Acute on chronic respiratory failure, worsening  # ILD related to dermatomyositis, on transplant list  # Severe pulmonary hypertension (WHO class III)  # Pneumomediastinum, 2/22/2018   - Supplemental oxygen via VA ECMO and ventilator to keep saturation above 92 %.  - Incentive spirometer every 15- 30 minutes when awake.  - Flolan 20mg/kg/min  - Nitric oxide at 20ppm-> 10ppm and continue to wean as able  - VA ECMO, q4h ABG    Cardiovascular:  # Right heart failure  # Pulmonary HTN    - Monitor hemodynamic status.   - Most likely 2/2 pulmonary hypertension from ILD, but did not rule out PE - unlikely given hypotension occurred after intubation and did not occur at the same time as her worsening hypoxia.    GI care:   - NPO except ice chips and medications.  - Place NJ today, trophic feeds as able    Fluids/ Electrolytes/ Nutrition:   - Periodic fluid bolus for IV fluid hydration  - Parenteral nutrition to start with placement of NG/NJ tube.    Renal/ Fluid Balance:  #RADHA-resolved    - Urine output is adequate so far.  - Will continue to monitor intake and  output.    Endocrine:    - Hydrocortisone per immunosuppressant needs.   - Insulin gtt     ID/ Antibiotics:  - Anti-rejection and immunosuppressant medications per transplant team including azathioprine, nystatin, Zosyn, Bactrim, Tacro, Vanco.    Heme:     - Hemoglobin stable.   - q4h Hgb monitoring with ECMO protocol    Prophylaxis:    - Mechanical prophylaxis for DVT.   - Heparin chemical DVT prophylaxis due to high risk of bleeding for ECMO circuit  - Famotidine BID    Lines/ tubes/ drains:  - VA-ECMO right subclavian, right femoral to move to RIJ  - Move RIJ to LIJ, move arterial line from left femoral to left radial/axillary artery    Disposition:  - CVICU.     Patient seen, findings and plan discussed with surgical ICU staff Dr. Aguilar.    Kwesi Schwartz MD  Anesthesiology Resident CA2, PGY3      ====================================    TODAY'S PROGRESS:   SUBJECTIVE:   - VA ECMO overnight.      OBJECTIVE:   1. VITAL SIGNS:   Temp:  [93.6  F (34.2  C)-100.2  F (37.9  C)] 98.1  F (36.7  C)  Heart Rate:  [] 71  Resp:  [13-52] 28  BP: ()/() 99/74  MAP:  [65 mmHg-107 mmHg] 75 mmHg  Arterial Line BP: ()/(58-84) 94/66  FiO2 (%):  [50 %-100 %] 50 %  SpO2:  [79 %-100 %] 100 %  Ventilation Mode: CMV/AC  (Continuous Mandatory Ventilation/ Assist Control)  FiO2 (%): 50 %  Rate Set (breaths/minute): 28 breaths/min  Tidal Volume Set (mL): 350 mL  PEEP (cm H2O): 12 cmH2O  Oxygen Concentration (%): 40 %  Resp: 28    2. INTAKE/ OUTPUT:   I/O last 3 completed shifts:  In: 1801.64 [I.V.:1051.64]  Out: 885 [Urine:885]    3. PHYSICAL EXAMINATION:   General: Sedated, NAD  Neuro: A&Ox3, NAD  Resp: Intubated, diminished lung sounds bilaterally  CV: RRR, No noted m/r/g  Abdomen: Soft, Non-distended  Incisions: c/d/i  Extremities: warm and well perfused    4. INVESTIGATIONS:   Arterial Blood Gases     Recent Labs  Lab 02/28/18  0559 02/28/18  0357 02/28/18  0216 02/27/18  2332   PH 7.49* 7.43 7.39 7.41   PCO2  33* 36 38 37   PO2 185* 210* 238* 354*   HCO3 25 24 23 24     Complete Blood Count     Recent Labs  Lab 02/28/18  0357 02/27/18  2332 02/27/18  1500 02/26/18  0506 02/24/18  0606   WBC 17.4* 19.4*  --  12.2* 13.3*   HGB 9.6* 11.0*  --  13.0 13.4    235 349 305 339     Basic Metabolic Panel    Recent Labs  Lab 02/28/18  0357 02/27/18  2332 02/27/18  1500 02/27/18  1013 02/27/18  0537 02/26/18  0506 02/24/18  0606    139  --   --   --  142 141   POTASSIUM 4.1 4.4  --  4.5  --  4.4 4.2   CHLORIDE 106 105  --   --   --  108 107   CO2 23 23  --   --   --  29 28   BUN 35* 33*  --   --   --  25 20   CR 1.26* 1.10* 0.95  --  0.97 0.93 0.84   * 193*  --   --   --  76 87     Liver Function Tests    Recent Labs  Lab 02/28/18  0357 02/21/18  1439   AST  --  26   * 20   ALKPHOS  --  58   BILITOTAL  --  0.7   ALBUMIN 2.4* 3.2*   INR 1.52*  --      Pancreatic Enzymes  No lab results found in last 7 days.  Coagulation Profile    Recent Labs  Lab 02/28/18  0357   INR 1.52*   PTT 54*     Lactate  Invalid input(s): LACTATE    5. RADIOLOGY:   Recent Results (from the past 24 hour(s))   XR Chest Port 1 View    Narrative    Exam:  Chest X-ray 2/27/2018 10:28 AM    History: Worsening hypoxia;     Comparison: CT 2/22/2018, chest x-ray 2/21/2018    Findings: Portable AP upright radiograph of the chest. The trachea is  midline. The cardiac silhouette is normal in size. The pulmonary  vasculature is indistinct. Bilateral diffuse reticulonodular opacities  that are basilar predominant, better characterized on CT 2/21/2018. No  pleural effusion or pneumothorax. The upper abdomen appears  unremarkable.       Impression    Impression:   Bilateral diffuse reticulonodular opacities, better characterized on  CT on 2/20/2019, that are suggestive of interstitial lung disease.  Superimposed infection cannot be excluded.    I have personally reviewed the examination and initial interpretation  and I agree with the  findings.    ADAM GONZALEZ MD   XR Abdomen 1 View    Narrative    Exam: XR ABDOMEN 1 VW, 2/27/2018 2:20 PM    Indication: check placement of OG tube;     Comparison: Chest CT 2/20/2018.    Findings:   No OG tube is visualized. Central line tip at the low SVC. Partially  visualized endotracheal tube tip at the level of the clavicles.    Redemonstrated dilatation of the large bowel with mildly prominent  loops of small bowel in the left upper quadrant. No pneumatosis or  portal venous gas.    Diffuse fibrotic changes in the lungs.      Impression    Impression:   1. No OG tube is visualized on this study.  2. Redemonstrated dilated large bowel, otherwise nonobstructive bowel  gas pattern.    I have personally reviewed the examination and initial interpretation  and I agree with the findings.    CHAUNCEY HUNTER MD ([place])   XR Chest Port 1 View    Narrative    EXAM: XR CHEST PORT 1 VW  2/27/2018 2:22 PM      HISTORY: check placement of ET tube and central line;     COMPARISON: Chest x-ray from earlier today 2/27/2018    FINDINGS: Portable AP view of the chest obtained. New endotracheal  tube tip projects 4.2 cm from the gee. New right IJ central line  tip projects over the cavoatrial junction. The trachea is midline. The  cardiac silhouette is within normal limits. Low lung volumes. The  costophrenic angles are clear. No pneumothorax. Unchanged diffuse  interstitial fibrotic changes. Slightly increased patchy airspace  opacities in the midlungs.       Impression    IMPRESSION:   1. Appropriate positions of the new endotracheal tube and right IJ  central line.  2. Unchanged diffuse interstitial fibrotic changes bilaterally.  Increased patchy airspace opacities in the midlungs may represent  developing superimposed atelectasis or infection.    I have personally reviewed the examination and initial interpretation  and I agree with the findings.    ADAM GONZALEZ MD   XR Abdomen Port 1 View    Narrative    Exam: XR  ABDOMEN PORT 1 VW, 2/27/2018 3:13 PM    Indication: Check OG placement;     Comparison: Same-day abdominal x-ray    Findings:   OG side hole and tip advanced into the stomach.    Redemonstrated dilatation of the large bowel and stomach. Otherwise  nonobstructive bowel gas pattern. No pneumatosis.    Partially visualized central venous catheter tip at the low SVC.  Findings of interstitial lung disease.      Impression    Impression:   OG tube sidehole and tip in the stomach.    I have personally reviewed the examination and initial interpretation  and I agree with the findings.    CHAUNCEY HUNTER MD ([place])   XR Chest Port 1 View    Narrative    XR CHEST PORT 1 VW  2/27/2018 10:30 PM    History: ECMO placement    Comparison: Earlier on the same day.    Findings:   Single portable AP view of the chest is obtained. Endotracheal tube is  in stable position approximately 3.7 cm above the gee. Right  internal jugular central venous catheter tip projects over the mid  SVC. There is a right inferior approach ECMO cannula with the tip  projecting near the superior cavoatrial junction. There is a gastric  type tube in the thoracic midline with the sidehole projecting over  the distal esophagus and the tip projecting over the proximal stomach.    The cardiomediastinal silhouette is not enlarged. Diffuse interstitial  fibrotic opacities are again noted. Stable appearance of the overlying  scattered patchy opacities. No suspected pleural effusion. No  pneumothorax.      Impression    IMPRESSION:  1.  New inferior approach ECMO cannula tip is near the superior  cavoatrial junction.  2.  Gastric tube sidehole projects over the distal esophagus, with the  tip projecting over the proximal stomach. Consider advancement.  3.  Stable diffuse interstitial opacities with overlying patchy  opacities which could represent atelectasis or airspace consolidation.    I have personally reviewed the examination and initial interpretation  and I  agree with the findings.    ANNE MANZANO MD   XR Abdomen Port 1 View    Narrative    XR ABDOMEN PORT 1 VW  2/27/2018 10:33 PM    History: Interval placement    Comparison: Radiograph from earlier on the same day    Findings:   A single portable view of the upper abdomen is obtained. There is a NG  tube with the sidehole projecting over the distal esophagus and the  tip projecting over the stomach. New inferior approach ECMO cannula  with the tip near the superior cavoatrial junction. Scattered  intraluminal bowel gas within nondilated small bowel.      Impression    IMPRESSION:  NG tube sidehole projects over the distal esophagus. Consider  advancement. ECMO cannula tip in the high right atrium.     I have personally reviewed the examination and initial interpretation  and I agree with the findings.    ANNE MANZANO MD   XR Chest Port 1 View    Narrative    XR CHEST PORT 1 VW  2/27/2018 10:30 PM    History: ECMO placement    Comparison: Prior day    Findings:   Single portable AP view of the chest is obtained. Endotracheal tube is  in stable position. Right internal jugular central venous catheter tip  projects over the mid SVC. There is a right inferior approach ECMO  cannula with the tip projecting near the superior cavoatrial junction.  Postsurgical changes of a Gelweave graft over the right shoulder.  Rounded opacity over the shoulder presumably related to that surgery.  There is a gastric type tube in the thoracic midline with the sidehole  projecting over the distal esophagus and the tip projecting over the  proximal stomach.    The cardiomediastinal silhouette is not enlarged. Diffuse interstitial  fibrotic opacities are again noted. Stable appearance of the overlying  scattered patchy opacities, most significant in the left lateral lower  lobe. No suspected pleural effusion. No pneumothorax.      Impression    IMPRESSION:  1. Inferior approach ECMO cannula tip is near the superior  cavoatrial  junction.  2. Gastric tube sidehole projects over the distal esophagus, with the  tip projecting over the proximal stomach. Consider advancement.  3. Stable diffuse interstitial opacities with overlying patchy  opacities which could represent atelectasis or airspace consolidation.    I have personally reviewed the examination and initial interpretation  and I agree with the findings.    ANNE MANZANO MD       =========================================

## 2018-02-28 NOTE — BRIEF OP NOTE
[facility], [place]    Brief Operative Note    Pre-operative diagnosis: heart failure  Post-operative diagnosis * No post-op diagnosis entered *  Procedure: Procedure(s):  INSERT EXTRACORPORAL MEMBRANE OXYGENATOR ADULT (OUTSIDE OR)  - Wound Class: I-Clean  Surgeon: Surgeon(s) and Role:     * Toby Hernandez MD - Primary     * Jacinto Ackerman MD - Assisting  Anesthesia: General   Estimated blood loss: Less than 50 ml  Drains: None  Specimens: * No specimens in log *  Findings:   None.  Complications: None.  Implants: None.

## 2018-02-28 NOTE — PLAN OF CARE
Problem: Patient Care Overview  Goal: Plan of Care/Patient Progress Review  PT / 4E - HOLDING. PT orders received and acknowledged.  Pt not medically appropriate for PT evaluation at this time.  PT will continue to follow and initiate when appropriate.

## 2018-02-28 NOTE — PLAN OF CARE
Problem: Patient Care Overview  Goal: Plan of Care/Patient Progress Review  Outcome: No Change  PT remains on on VA ECMO, intubated and sedated with versed and fentanyl. MAPs 60-75, titrating norepi as needed, currently at 0.02. Pt's nitric weaned off at 1300. UOP 20-40cc/hr. Ins gtt currently at 1. TF was started today at 10cc/hr. Will advance when able to. New L rad art line and L IJ were placed today. Potassium replaced x1. Plan is for pt to go to OR tomorrow to have ECMO cannulation moved to her subclavian area. Will continue to monitor and update as needed. Refer to flowsheets for further documentation.

## 2018-02-28 NOTE — PROGRESS NOTES
Transplant Social Work Services Progress Note      Date of Initial Social Work Evaluation: 2/20/2017  Collaborated with: bedside RN    Data: supportive visit with patient spouse and 2 adult daughters.    Intervention: supportive counseling.  Encouraged self care.  Allowed them to review events of last 24 hours.  Seem to have good understanding of patient situation and plan.    Assessment: tired, but supporting each other effectively.    Education provided by SW:  Reviewed what to expect while patient waits for transplant.    Plan:    Discharge Plans in Progress: none.      Barriers to d/c plan: critical illness    Follow up Plan: will continue to follow for support, counseling, education and resources.

## 2018-02-28 NOTE — PLAN OF CARE
Problem: Patient Care Overview  Goal: Plan of Care/Patient Progress Review  Outcome: No Change  Stable on VA ecmo overnight. UoP trending down with Cr trending up. Total 750 ml of albumin given overnight. No chugging on circuit. Restarted pressors and patient was rewarmed after cannulation with salma hugger and ecmo warmer. No2 weaned to 5 and tolerating. Patient is off paralytic and has cough with suction. Versed weaned to 3 but no meaningful response yet. Continue to wean sedation for some neuro response as able.insulin gtt titrated to maintain glucose control.

## 2018-02-28 NOTE — PROCEDURES
"Central line  Date/Time: 2/28/2018 405PM  Performed by: REYMUNDO SCHWARTZ  Authorized by: MARIA DOLORES BRAR   Consent: Verbal consent obtained from spouse. Written consent obtained from spouse.  Risks and benefits: risks, benefits and alternatives were discussed  Consent given by: spouse ()  Patient understanding: patient spouse states understanding of the procedure being performed  Patient consent: the patient's understanding of the procedure matches consent given  Procedure consent: procedure consent matches procedure scheduled  Relevant documents: relevant documents present and verified  Test results: test results available and properly labeled  Site marked: the operative site was marked  Imaging studies: imaging studies available  Required items: required blood products, implants, devices, and special equipment available  Patient identity confirmed: arm band, provided demographic data and hospital-assigned identification number  Time out: Immediately prior to procedure a \"time out\" was called to verify the correct patient, procedure, equipment, support staff and site/side marked as required.  Indications: vascular access and central pressure monitoring  Anesthesia: see MAR for details   Sedation:  Patient sedated: yes  Sedation type: sedation for intubation  Vitals: Vital signs were monitored during sedation.  Preparation: skin prepped with chlorhexidine and sterile field established  Location details: left internal jugular  Patient position: Trendelenburg   Catheter type: triple lumen  Pre-procedure: landmarks identified  Ultrasound guidance: yes  Number of attempts: 1  Successful placement: yes  Post-procedure: line sutured and dressing applied  Assessment: blood return through all parts,  free fluid flow and placement verified by x-ray  Patient tolerance: Patient tolerated the procedure well with no immediate complications    Reymundo Schwartz MD  Anesthesiology Resident CA2, PGY3    "

## 2018-02-28 NOTE — PROGRESS NOTES
ECLS Cannulation Note:    Date on: 2/27/2018  Time on: 2038  Surgeon: David    Arterial Cannula: 8mm graft Fr. In the Right axilary      Venous Cannula: 19 Fr. In the Right Femoral Vein      ECMO components include [employer] Lot # [id number]    Cannulation was performed at the patient's bedside, placement was verified by ECHO, cannulas are in good position.  ISTAT and blood cooler are at bedside. Patient's blood type is A, positive.    Kaylin Troy, RRT  2/27/2018 10:40 PM

## 2018-02-28 NOTE — PROGRESS NOTES
[facility]  WO Nurse Inpatient Adult Pressure Injury Prevention Assessment: ECMO,  [facility] Nurse Inpatient Pressure Injury Assessment     Initial Focused Assessment:    ECMO cannulas in right subclavian artery and the right femoral vein.   plan is to move her central line to the left subclavian vein, and then in a few days, place a right IJ venous cannula so the right femoral venous cannula can be discontinued     Pressure Injury Present: Yes, bridge of nose (see below) Stage 2  Positioning Tolerance: Poor  Expected Duration of ECMO: unknown   Presence of Ischemia: No     Pressure Injury Prevention Interventions In Place:        Z flow Positioner      Support Surface:  Low air loss mattress Isolibrium     Pressure Injury Prevention Interventions Added:       TAPs Wedge Positioner      Prevalon Heel Lift Boots      Mepilex Sacral Dressing       Patient History: Per MD Note:56 yo lady with acute on chronic hypoxic respiratory failure from ILD currently awaiting transplant  Current Diet/Nutrition:   Active Diet Order      NPO for Medical/Clinical Reasons Except for: Meds    Marcus: Marcus Score  Av.5  Min: 11  Max: 20     Labs:   Recent Labs   Lab Test  18   0953  18   0357  18   2332   ALBUMIN   --   2.4*   --    HGB  8.7*  9.6*  11.0*   INR  1.58*  1.52*   --    WBC  15.2*  17.4*  19.4*   A1C   --    --   5.3       Noted during assessment:  Pressure Injury: on bridge of nose  hospital acquired   This is a Medical Device Related Pressure Injury (MDRPI) due to BiPAP mask  Pressure Injury is Stage 2   Status: healing     Physical Exam  Wound Location:  [facility]         Date of last Photo 18  Wound History: pt with BiPAP mask prior to ECMO cannulation.  Wound noted when mask removed   Measurements (length x width x depth, in cm) 0.7 cm x 0.4 cm  x  0.1 cm   Wound Base:  100 % thin red fibrinous scab   Palpation of the wound bed: normal   Periwound skin: intact  Color: normal and  consistent with surrounding tissue  Temperature: normal   Drainage:, none  Pain: no grimacing or signs of discomfort,     Plan of Care for: nose  Pressure source removed, no intervention necessary unless BiPAP resumed     Positioning and Pressure Injury Prevention:   Reposition patient and head every 1-2 hours using Z flow and Prevalon Wedges   Pad ECMO groin and chest cannula under rigid connectors with Optifoam or  Soft Cloth   Heel Lift Boots at all times   Sacral Mepilex for Prevention    When IJ cannulated, Pad ECMO IJ cannula with Optifoam (#[id number]) along face and scalp between skin and cannula and under Coban head wraps     WOC to follow up: weekly     Face to Face Time: 20  minutes

## 2018-02-28 NOTE — PHARMACY-VANCOMYCIN DOSING SERVICE
Pharmacy Vancomycin Initial Note  Date of Service 2018  Patient's  1960  57 year old, female    Indication: ECMO    Current estimated CrCl = Estimated Creatinine Clearance: 54.2 mL/min (based on Cr of 0.95).    Creatinine for last 3 days  2018:  5:06 AM Creatinine 0.93 mg/dL  2018:  5:37 AM Creatinine 0.97 mg/dL;  3:00 PM Creatinine 0.95 mg/dL    Recent Vancomycin Level(s) for last 3 days  No results found for requested labs within last 72 hours.      Vancomycin IV Administrations (past 72 hours)      No vancomycin orders with administrations in past 72 hours.                Nephrotoxins and other renal medications (Future)    Start     Dose/Rate Route Frequency Ordered Stop    18 2330  piperacillin-tazobactam (ZOSYN) 3.375 g vial to attach to  mL bag      3.375 g  over 30 Minutes Intravenous EVERY 6 HOURS 18 2243      18 1800  tacrolimus (GENERIC EQUIVALENT) capsule 4 mg      4 mg Per Feeding Tube 2 TIMES DAILY. 18 1658      18 1545  vasopressin (PITRESSIN) 40 Units in D5W 40 mL infusion      2.4 Units/hr  2.4 mL/hr  Intravenous CONTINUOUS 18 1534      18 1400  norepinephrine (LEVOPHED) 16 mg in D5W 250 mL infusion      0.03-0.4 mcg/kg/min × 51.3 kg (Dosing Weight)  1.4-19.2 mL/hr  Intravenous CONTINUOUS 18 1353            Contrast Orders - past 72 hours     None                Plan:  1.  Start vancomycin  1000 mg IV q18h.   2.  Goal Trough Level: 15-20 mg/L   3.  Pharmacy will check trough levels as appropriate in 1-3 Days.    4. Serum creatinine levels will be ordered daily for the first week of therapy and at least twice weekly for subsequent weeks.    5. Baker method utilized to dose vancomycin therapy: Method 2    Luigi Reynolds

## 2018-02-28 NOTE — PROGRESS NOTES
CLINICAL NUTRITION SERVICES - ASSESSMENT NOTE     Nutrition Prescription    RECOMMENDATIONS FOR MDs/PROVIDERS TO ORDER:  Bowel regimen    Malnutrition Status:    Severe malnutrition in the context of chronic illness    Recommendations already ordered by Registered Dietitian (RD):  1. Begin Nutren 1.5 @ 10 mL/hr with no advancement yet.    2. Free water flushes 30 mL q4h for patency.    3. Certavite to ensure adequate micronutrients in case of slow TF adv, EN interruptions, hypermetabolic needs.    4. Ensure K+, Mg++, phos ordered daily while nutrition support advancing. Do not adv if K+, Mg++ < nrml or phos <2. Replace lytes per protocol.    Future/Additional Recommendations:  1. Once able to adv TF, recommend adv 10 mL q8h to goal Nutren 1.5 @ 40 ml/hr (960 ml/day) to provide 1440 kcals, 65 g PRO, 730 ml free H2O, 169 g CHO and 0 g Fiber daily. Additionally administer 1 pkt ProSource BID (80 kcal, 22 g PRO) for total 1520 kcal (30 kcal/kg) and 87 g PRO (1.7 g/kg)     REASON FOR ASSESSMENT  Kecia Blue is a/an 57 year old female assessed by the dietitian for Provider Order - Registered Dietitian to Assess and Order TF per Medical Nutrition Therapy Protocol    NUTRITION HISTORY  Pt was seen 2/20 by outpatient RD for lung transplant evaluation with the following nutrition hx:  Pt's  cooks at home.  Appetite: poor appetite most of her life, not new with dx of lung disease   Vitamins, Supplements, Pertinent Meds: calcium, MVI, prednisone   Herbal Medicines/Supplements: none      Diet Recall  Breakfast Toast or eggs leftover from 's breakfast    Lunch 1/2 s/w or Boost - started Boost 1 week ago    Dinner Soup, hot dish, leftovers, meatloaf, salad, lasagna    Snacks Peanuts/M&M/raisin mixture    Beverages Water, coffee, 1 pop/day, 6 oz juice/day, 12 oz 2% milk/day    Alcohol None    Dining out 2x/month        CURRENT NUTRITION ORDERS  Diet: NPO since 2/27; regular diet previously.   Intake/Tolerance:  "Variable meal intake prior to intubation, % intake documented. Per pt's daughters, Po intake reduced x 2 days prior to intubation    LABS  Labs reviewed  Lactic acid 3.8     Vitamin D WNL (55) on 2/19/18    MEDICATIONS  Medications reviewed  Norepi gtt    ANTHROPOMETRICS  Height: 160 cm (5' 3\")  Most Recent Weight: 51.3 kg (113 lb 1.5 oz)    IBW: 52.3 kg  BMI: Normal BMI  Weight History: Lowest wt this admit was 50.3 kg on 2/22 - loss of 3.8% in 3 months  Wt Readings from Last 10 Encounters:   02/27/18 51.3 kg (113 lb 1.5 oz)   02/20/18 51.5 kg (113 lb 9.6 oz)   01/05/18 54.6 kg (120 lb 4.8 oz)   11/20/17 52.2 kg (115 lb)     Dosing Weight: 50 kg    ASSESSED NUTRITION NEEDS  Estimated Energy Needs: 1250 - 1500+ kcals/day (25 - 30 kcals/kg); increase to 1500 - 1750 (30-35 kcal/kg) if receives lung transplant  Justification: Maintenance and Vented, higher end w/ lower BMI  Estimated Protein Needs: 60 - 75+ grams protein/day (1.2 - 1.5+ grams of pro/kg); increase to 65 - 75+ (1.3 - 1.5+ g/kg) if receives lung transplant  Justification: Hypercatabolism with critical illness  Estimated Fluid Needs: 1 mL/kcal  Justification: Maintenance    PHYSICAL FINDINGS  See malnutrition section below.  Fraility screening per RD note 2/20:  Average of 3 trials: 10 kg with BMI of 19.5  Meets criteria for frailty based on handgrip strength: yes    MALNUTRITION  % Intake: Decreased intake does not meet criteria  % Weight Loss: Weight loss does not meet criteria  Subcutaneous Fat Loss: Upper arm, Lower arm and Thoracic/intercostal: mild  Muscle Loss: Thoracic region (clavicle, acromium bone, deltoid, trapezius, pectoral), Upper arm (bicep, tricep) and Lower arm  (forearm): moderate  Fluid Accumulation/Edema: None noted  Malnutrition Diagnosis: Severe malnutrition in the context of chronic illness (given also meets severe criteria for hand  strength below standard)    NUTRITION DIAGNOSIS  Inadequate protein-energy intake related " to NPO x 1 day, intubated with no nutrition support yet started as evidenced by PO intakes reduced x 2 days prior to intubation, muscle/fat wasting on physical exam       INTERVENTIONS  Implementation  Nutrition Education: Provided education on nutrition POC to daughters. Pt not appropriate for education at this time d/t pt condition.    Collaboration with other providers - Discussed placing FT/starting TFs w/ providers on rounds. FT likely gastric, team requested trophic TF for now.  Enteral Nutrition - Initiate trophic TFs  Feeding tube flush - patency flushes  Multivitamin/mineral supplement therapy - certavite    Goals  Patient to consume % of nutritionally adequate meal trays TID, or the equivalent with supplements/snacks.     Monitoring/Evaluation  Progress toward goals will be monitored and evaluated per protocol.    Natalia Sargent, AMALIA, LD, [facility]  CVICU Dietitian  Pager: [phone]

## 2018-02-28 NOTE — PROGRESS NOTES
Cardiothoracic surgery    The patient deteriorated with worsening acute hypoxic respiratory failure leading to right heart failure recalcitrant to inotropes, Flolan, and Alan.    After discussion amongst the lung transplant pulmonologists and surgeons, the consensus agreement was to initiate VA ECMO with peripheral cannulation. The patient is now stable off vasoactive drips on VA ECMO through the right subclavian artery and the right femoral vein.    Tomorrow we can move her central line to the left subclavian vein, and then in a few days, we can place a right IJ venous cannula so we can remove the right femoral venous cannula. This will allow mobilization. She has a left femoral arterial line, but this can be replaced with a left radial or brachial arterial line.     We will wean the Alan and Flolan off and then we can wean the mechanical ventilator with a goal to extubate her in the next few days.    Toby Hernanedz MD PhD

## 2018-02-28 NOTE — PROGRESS NOTES
ECMO Shift Summary:    Patient remains on VA ECMO, all equipment is functioning and alarms are appropriately set. RPM's 2980   with flow range 2.5-3.5 L/min. Sweep gas is at 1 LPM and FiO2 100%. Circuit remains free of air, clot and fibrin. Cannulas are secure with no bleeding from site. Extremities are cool. Suctioned ETT for small amount of white secretions.    Significant Shift Events:    Vent settings:  Ventilation Mode: CMV/AC  (Continuous Mandatory Ventilation/ Assist Control)  FiO2 (%): 50 %  Rate Set (breaths/minute): 28 breaths/min  Tidal Volume Set (mL): 350 mL  PEEP (cm H2O): 12 cmH2O  Oxygen Concentration (%): 40 %  Resp: 21.    Heparin is running at 1000 u/hr, ACT range 152-217.    Urine output is as charted, blood loss was minimal oozing. Product given included none.      Intake/Output Summary (Last 24 hours) at 18 0625  Last data filed at 18 0600   Gross per 24 hour   Intake          1810.64 ml   Output              885 ml   Net           925.64 ml       ECHO:  No results found for this or any previous visit.  Results for orders placed during the hospital encounter of 18   Echocardiogram    Narrative [id number]  [id number]  [id number]  [id number]^SHERINE^HODAN^           [facility],[place]  Echocardiography Laboratory  [address] [place] [id number]     Name: SARAI KILLIAN  MRN: [id number]  : 1960  Study Date: 2018 04:27 PM  Age: 57 yrs  Gender: Female  Patient [facility]: [facility]  Reason For Study: SOB  Ordering Physician: HODAN BASURTO  Referring Physician: NIKUNJ ADAIR  Performed By: Geovany Aguilar RDCS     BSA: 1.5 m2  Height: 63 in  Weight: 113 lb  BP: 101/69 mmHg  _____________________________________________________________________________  __        Procedure  Complete Portable Echo Adult.  _____________________________________________________________________________  __        Interpretation Summary  The LV cavity is small and  is underfilled. The Ejection Fraction is estimated  at 65-70%. Flattened septum is consistent with right ventricular pressure and  volume overload.  The RV free wall is severely hypokinetic with some preserved contractility of  the RV apex. This is consistent with McConnels sign and may be concerning for  acute pulmonary embolism in the right clinical setting.  There is lack of coaptation of the tricuspid leaflets due to severe annular  dilation. Right ventricular systolic pressure is 88.79mmHg above the right  atrial pressure. Moderate tricuspid insufficiency is present.  Small, 1.6cm pericardial effusion primarily localized anterior to the RV free  wall.     Compared to prior study on 2/19/2018, RV is more dilated, RV function has  worsened and estimated PA pressure is higher. Patient is intubated at the time  of the study.  Discussed with MICU team at 5:05pm.  _____________________________________________________________________________  __        Left Ventricle  The LV cavity is small and is underfilled. The Ejection Fraction is estimated  at 65-70%. Flattened septum is consistent with right ventricular pressure and  volume overload.     Right Ventricle  Severe right ventricular dilation is present. Global right ventricular  function is severely reduced. The RV free wall is severely hypokinetic with  some preserved contractility of the RV apex. This is consistent with McConnels  sign concerning for acute pulmonary embolism.     Atria  The left atrium appears normal. Moderate right atrial enlargement is present.     Mitral Valve  The mitral valve is normal.        Aortic Valve  The aortic valve cannot be assessed.     Tricuspid Valve  The tricuspid valve is normal. There is lack of coaptation of the tricuspid  leaflets due to severe annular dilation. Moderate tricuspid insufficiency is  present. Right ventricular systolic pressure is 88.79mmHg above the right  atrial pressure.     Pulmonic Valve  The pulmonic  valve cannot be assessed.     Vessels  The aorta root cannot be assessed. The inferior vena cava is normal. Unable to  assess mean RA pressure given the patient is on a ventilator.     Pericardium  Small, 1.6cm pericardial effusion primarily localized anterior to the RV free  wall.     _____________________________________________________________________________  __  MMode/2D Measurements & Calculations  RVDd: 5.1 cm  TAPSE: 1.3 cm           Doppler Measurements & Calculations  TV max P.7 mmHg  TR max herberth: 439.4 cm/sec  TR max P.7 mmHg     _____________________________________________________________________________  __           Report approved by: ANGELIKA Luna 2018 05:15 PM          CXR:  Recent Results (from the past 24 hour(s))   XR Chest Port 1 View    Narrative    Exam:  Chest X-ray 2018 10:28 AM    History: Worsening hypoxia;     Comparison: CT 2018, chest x-ray 2018    Findings: Portable AP upright radiograph of the chest. The trachea is  midline. The cardiac silhouette is normal in size. The pulmonary  vasculature is indistinct. Bilateral diffuse reticulonodular opacities  that are basilar predominant, better characterized on CT 2018. No  pleural effusion or pneumothorax. The upper abdomen appears  unremarkable.       Impression    Impression:   Bilateral diffuse reticulonodular opacities, better characterized on  CT on 2019, that are suggestive of interstitial lung disease.  Superimposed infection cannot be excluded.    I have personally reviewed the examination and initial interpretation  and I agree with the findings.    ADAM GONZALEZ MD   XR Abdomen 1 View    Narrative    Exam: XR ABDOMEN 1 VW, 2018 2:20 PM    Indication: check placement of OG tube;     Comparison: Chest CT 2018.    Findings:   No OG tube is visualized. Central line tip at the low SVC. Partially  visualized endotracheal tube tip at the level of the clavicles.    Redemonstrated  dilatation of the large bowel with mildly prominent  loops of small bowel in the left upper quadrant. No pneumatosis or  portal venous gas.    Diffuse fibrotic changes in the lungs.      Impression    Impression:   1. No OG tube is visualized on this study.  2. Redemonstrated dilated large bowel, otherwise nonobstructive bowel  gas pattern.    I have personally reviewed the examination and initial interpretation  and I agree with the findings.    CHAUNCEY HUNTER MD (Brandon)   XR Chest Port 1 View    Narrative    EXAM: XR CHEST PORT 1 VW  2/27/2018 2:22 PM      HISTORY: check placement of ET tube and central line;     COMPARISON: Chest x-ray from earlier today 2/27/2018    FINDINGS: Portable AP view of the chest obtained. New endotracheal  tube tip projects 4.2 cm from the gee. New right IJ central line  tip projects over the cavoatrial junction. The trachea is midline. The  cardiac silhouette is within normal limits. Low lung volumes. The  costophrenic angles are clear. No pneumothorax. Unchanged diffuse  interstitial fibrotic changes. Slightly increased patchy airspace  opacities in the midlungs.       Impression    IMPRESSION:   1. Appropriate positions of the new endotracheal tube and right IJ  central line.  2. Unchanged diffuse interstitial fibrotic changes bilaterally.  Increased patchy airspace opacities in the midlungs may represent  developing superimposed atelectasis or infection.    I have personally reviewed the examination and initial interpretation  and I agree with the findings.    ADAM GONZALEZ MD   XR Abdomen Port 1 View    Narrative    Exam: XR ABDOMEN PORT 1 VW, 2/27/2018 3:13 PM    Indication: Check OG placement;     Comparison: Same-day abdominal x-ray    Findings:   OG side hole and tip advanced into the stomach.    Redemonstrated dilatation of the large bowel and stomach. Otherwise  nonobstructive bowel gas pattern. No pneumatosis.    Partially visualized central venous catheter tip at the low  SVC.  Findings of interstitial lung disease.      Impression    Impression:   OG tube sidehole and tip in the stomach.    I have personally reviewed the examination and initial interpretation  and I agree with the findings.    CHAUNCEY (Luly HUNTER MD   XR Chest Port 1 View    Narrative    PRELIM  1.  New inferior approach ECMO cannula tip is near the superior  cavoatrial junction.  2.  Gastric tube sidehole projects over the distal esophagus, with the  tip projecting over the proximal stomach. Consider advancement.  3.  Stable diffuse interstitial opacities with overlying patchy  opacities which could represent atelectasis or airspace consolidation.   XR Abdomen Port 1 View    Narrative    PRELIM  NG tube sidehole projects over the distal esophagus. Consider  advancement.       Labs:    Recent Labs  Lab 02/28/18  0559 02/28/18  0357 02/28/18  0216 02/27/18  2332   PH 7.49* 7.43 7.39 7.41   PCO2 33* 36 38 37   PO2 185* 210* 238* 354*   HCO3 25 24 23 24   O2PER 40 40 50 40       Lab Results   Component Value Date    HGB 9.6 (L) 02/28/2018    PHGB <30 02/28/2018     02/28/2018    FIBR 651 (H) 02/28/2018    INR 1.52 (H) 02/28/2018    PTT 54 (H) 02/28/2018    DD 0.7 (H) 02/28/2018    AXA 0.52 02/28/2018         Plan is continue to provide VA ECMO support.      Kaylin Troy, RRT  2/28/2018 6:25 AM

## 2018-02-28 NOTE — PLAN OF CARE
Problem: Patient Care Overview  Goal: Plan of Care/Patient Progress Review    4E: Cxl - Per chart review and discussion with RN, pt with B groin lines - not appropriate for therapy this date.

## 2018-02-28 NOTE — PROCEDURES
Small Bowel Feeding Tube Placement Assessment  Reason for Feeding Tube Placement: request for post-pyloric FT by providers. Pt has known anterior nasal septal perforation (healed, chronic per ENT note). MDs ok w/ AMALIA proceding.   Cortrak Start Time: 11:21 AM   Cortrak End Time: 1:46 pm  **Note, this includes interruption of procedure from 12:00 pm-1:31 pm to attempt migration of FT.   Medicine Delivered During Procedure: lidocaine gel in nares  Placement Successful:  Presume gastric (pending AXR confirmation)    Procedure Complications: Excessive coiling in the fundus when attempted to advance post-pyloric  Final Placement Fortunato at exit of nare: 84 cm  Face to Face time with patient: 60 mins      Natalia Sargent RD, LD, CNSC  CVICU Dietitian  Pager: [phone]

## 2018-02-28 NOTE — CONSULTS
[facility], [place]    Palliative Care Consultation Note    Patient: Kecia Blue  Date of Admission:  2/21/2018    Requesting provider/team: Kwesi Schwartz MD  Reason for consult: Goals of care  Decisional support  Patient and family support    Recommendations:  -Goals are clear, continue to work toward transplant with full aggressive measures  -Family is feeling well informed and supported  - involvement is appreciated  -Appreciate Transplant SW involvement    These recommendations have been discussed with primary team.    Thank you for the opportunity to participate in the care of this patient and family. Our team will follow. Please feel free to contact the on-call Palliative provider with any urgent needs.     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 Now on VA ECMO waiting for a lung transplant.    Symptoms: no symptom burden     Social:         Living situation: was living        Support system: , three children, close        Functional status: greatly declined most recently in the last month-months       Occupation: worked on the farm with keeping the books       Hobbies: being a grandmother and she was always busy    Coping: seemed to be coping as expected,  is thankful they placed ECMO because it was really touch an go for a little while prior to cannulation.    Spiritual/Faith:    Spiritual background: open to spiritual support    Advance Care Planning:          Decision making capacity:  No       Disease understanding: family has good understanding       Goals of Care: full aggressive measures while on transplant list       Health care directive: yes       Health care agent: Josesito Blue- spouse, alternate is Yudelka and Evelyn and Nasreen which are her children       Code Status:  Full Code       POLST There is no POLST (Physician orders for life-sustaining treatment) form on file for this patient    History of Present Illness   Sources of History:electronic health record and patient's family    Kecia Blue is a 57 year old female with a 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 In the last 1-2 months her SOB has gotten much worse and using 10-15 L and was having a significantly difficult time with mobility and getting out of the house as a result of her medical status.     She suddenly became worse 2/27 requiring intubation and VA ECMO and remains on the transplant list.    ROS:  Palliative Symptom Review (0=no symptom/no concern, 1=mild, 2=moderate, 3=severe):   not able to obtain due to patient intubated and sedated       Past Medical History:   Past Medical History:   Diagnosis Date     Antisynthetase syndrome (H) 2014     Chronic cough      Dermatomyositis (H)      Dysplasia of cervix, low grade (ESTRADA 1)      ILD (interstitial lung disease) (H)      Osteopenia      Pulmonary hypertension      Raynaud's disease      Seronegative rheumatoid arthritis (H)           Past Surgical History:   Past Surgical History:   Procedure Laterality Date     ENT SURGERY      tonsillectomy as a child     no prior surgery               Family History:   Family History   Problem Relation Age of Onset     Hypertension Mother      Arthritis Mother      Pancreatic Cancer Father      DIABETES Father      Family history reviewed       Allergies:    No Known Allergies         Medications:   I have reviewed this patient's medication profile and medications given in the past 24 hours.  No relevant palliative medications at this time         Physical Exam:   Vital Signs: Temp: 98.1  F (36.7  C) Temp src: Esophageal BP: 99/74   Heart Rate: 77 Resp: 28 SpO2: 100 % O2 Device: Mechanical Ventilator    Weight: 113 lbs 1.54 oz    Physical Exam:  Constitutional: intubated and sedated on VA ECMO  Lungs: No increased work of breathing on the ventilator  Neurologic: patient is sedated  Neuropsychiatric: unable to obtain due to patient not able to communicate     Data reviewed:     ROUTINE ICU LABS (Last four results)  CMP  Recent Labs  Lab 02/28/18  0357 02/27/18  2332 02/27/18  1500 02/27/18  1013 02/27/18  0537 02/26/18  0506 02/24/18  0606  02/23/18  0655 02/22/18  0503 02/21/18  1439    139  --   --   --  142 141  < > 140 139 142   POTASSIUM 4.1 4.4  --  4.5  --  4.4 4.2  < > 4.2 4.5 4.3   CHLORIDE 106 105  --   --   --  108 107  < > 106 105 107   CO2 23 23  --   --   --  29 28  < > 28 26 28   ANIONGAP 11 12  --   --   --  5 6  < > 7 8 7   * 193*  --   --   --  76 87  < > 88 110* 96   BUN 35* 33*  --   --   --  25 20  < > 25 23 16   CR 1.26* 1.10* 0.95  --  0.97 0.93 0.84  < > 1.15* 1.05* 0.93   GFRESTIMATED 44* 52* 61  --  60* 62 71  < > 49* 54* 63   GFRESTBLACK 53* 62 74  --  72 75 86  < > 59* 66 76   CHELSEY 8.1* 8.3*  --   --   --  8.5 8.2*  < > 8.9 8.9 8.6   MAG 1.5*  --   --   --   --   --  1.8  --  1.6 1.6  --    PHOS 3.9  --   --   --   --   --   --   --   --   --   --    PROTTOTAL  --   --   --   --   --   --   --   --   --   --  7.8   ALBUMIN 2.4*  --   --   --   --   --   --   --   --   --  3.2*   BILITOTAL  --   --   --   --   --   --   --   --   --   --  0.7   ALKPHOS  --   --   --   --   --   --   --   --   --   --  58   AST  --   --   --   --   --   --   --   --   --   --  26   *  --   --   --   --   --   --   --   --   --  20   < > =  values in this interval not displayed.  CBC  Recent Labs  Lab 02/28/18  0357 02/27/18  2332 02/27/18  1500 02/26/18  0506 02/24/18  0606   WBC 17.4* 19.4*  --  12.2* 13.3*   RBC 2.91* 3.31*  --  3.91 4.02   HGB 9.6* 11.0*  --  13.0 13.4   HCT 30.3* 34.5*  --  41.5 41.9   * 104*  --  106* 104*   MCH 33.0 33.2*  --  33.2* 33.3*   MCHC 31.7 31.9  --  31.3* 32.0   RDW 14.9 14.9  --  15.2* 15.1*    235 349 305 339     INR  Recent Labs  Lab 02/28/18  0357   INR 1.52*     Arterial Blood Gas  Recent Labs  Lab 02/28/18  0756 02/28/18  0559 02/28/18  0357 02/28/18  0216   PH 7.48* 7.49* 7.43 7.39   PCO2 34* 33* 36 38   PO2 136* 185* 210* 238*   HCO3 25 25 24 23   O2PER 40 40 40 50       ADRIÁN Bean CNS  Palliative Care Consult Team  Pager: [phone]    [facility] Inpatient Team Consult pager [phone] (M-F 8-4:30)  After-hours Answering Service [phone]  Palliative Clinic: [phone]     Total time spent was 70 minutes,  >50% of time was spent counseling and/or coordination of care regarding goals of care and decisional support.

## 2018-02-28 NOTE — PHARMACY
Pharmacy Tube Feeding Consult    Medication reviewed for administration by feeding tube and for potential food/drug interactions.    Recommendation: No changes are needed at this time.     Pharmacy will continue to follow as new medications are ordered.    Katelyn Maher, JoseD

## 2018-02-28 NOTE — PLAN OF CARE
Problem: Patient Care Overview  Goal: Plan of Care/Patient Progress Review  Outcome: Declining  Patient's progress declining since transferring to 4A from . Patient on pressors for BP control, sedation for comfort, paralytic for decreasing WOB, and Nitric oxide for PAH support. Patient FiO2 at 100% on a PEEP of 12 and RR at 28. Currently surgical team at bedside for VA ECMO cannulation. Will continue to monitor for safety and comfort.    Immanuel Tom RN

## 2018-03-01 ENCOUNTER — RESULTS ONLY (OUTPATIENT)
Dept: OTHER | Facility: CLINIC | Age: 58
End: 2018-03-01

## 2018-03-01 ENCOUNTER — DOCUMENTATION ONLY (OUTPATIENT)
Dept: TRANSPLANT | Facility: CLINIC | Age: 58
End: 2018-03-01

## 2018-03-01 ENCOUNTER — APPOINTMENT (OUTPATIENT)
Dept: GENERAL RADIOLOGY | Facility: CLINIC | Age: 58
DRG: 003 | End: 2018-03-01
Attending: THORACIC SURGERY (CARDIOTHORACIC VASCULAR SURGERY)
Payer: COMMERCIAL

## 2018-03-01 ENCOUNTER — ANESTHESIA (OUTPATIENT)
Dept: SURGERY | Facility: CLINIC | Age: 58
End: 2018-03-01

## 2018-03-01 ENCOUNTER — ORGAN (OUTPATIENT)
Dept: TRANSPLANT | Facility: CLINIC | Age: 58
End: 2018-03-01

## 2018-03-01 ENCOUNTER — APPOINTMENT (OUTPATIENT)
Dept: GENERAL RADIOLOGY | Facility: CLINIC | Age: 58
DRG: 003 | End: 2018-03-01
Attending: INTERNAL MEDICINE
Payer: COMMERCIAL

## 2018-03-01 ENCOUNTER — ANESTHESIA EVENT (OUTPATIENT)
Dept: SURGERY | Facility: CLINIC | Age: 58
End: 2018-03-01

## 2018-03-01 DIAGNOSIS — Z76.82 AWAITING ORGAN TRANSPLANT: Primary | ICD-10-CM

## 2018-03-01 LAB
ALBUMIN SERPL-MCNC: 1.7 G/DL (ref 3.4–5)
ALBUMIN SERPL-MCNC: 2.4 G/DL (ref 3.4–5)
ALBUMIN UR-MCNC: 30 MG/DL
ALP SERPL-CCNC: 35 U/L (ref 40–150)
ALP SERPL-CCNC: 37 U/L (ref 40–150)
ALT SERPL W P-5'-P-CCNC: 172 U/L (ref 0–50)
ALT SERPL W P-5'-P-CCNC: 65 U/L (ref 0–50)
ANGLE RATE OF CLOT GROWTH: 16.4 DEG (ref 59–74)
ANGLE RATE OF CLOT GROWTH: 75.7 DEG (ref 59–74)
ANION GAP SERPL CALCULATED.3IONS-SCNC: 7 MMOL/L (ref 3–14)
ANION GAP SERPL CALCULATED.3IONS-SCNC: 7 MMOL/L (ref 3–14)
ANION GAP SERPL CALCULATED.3IONS-SCNC: 8 MMOL/L (ref 3–14)
APPEARANCE UR: CLEAR
APTT PPP: 100 SEC (ref 22–37)
APTT PPP: 102 SEC (ref 22–37)
APTT PPP: 119 SEC (ref 22–37)
APTT PPP: >240 SEC (ref 22–37)
APTT PPP: >240 SEC (ref 22–37)
AST SERPL W P-5'-P-CCNC: 38 U/L (ref 0–45)
AST SERPL W P-5'-P-CCNC: 58 U/L (ref 0–45)
AT III ACT/NOR PPP CHRO: 70 % (ref 85–135)
BACTERIA SPEC CULT: NORMAL
BACTERIA SPEC CULT: NORMAL
BASE DEFICIT BLDA-SCNC: 3.4 MMOL/L
BASE DEFICIT BLDA-SCNC: 4.3 MMOL/L
BASE DEFICIT BLDA-SCNC: 4.6 MMOL/L
BASE DEFICIT BLDA-SCNC: 4.9 MMOL/L
BASE DEFICIT BLDA-SCNC: 4.9 MMOL/L
BASE DEFICIT BLDA-SCNC: 5.4 MMOL/L
BASE DEFICIT BLDA-SCNC: 5.9 MMOL/L
BASE DEFICIT BLDA-SCNC: 8.4 MMOL/L
BASE EXCESS BLDA CALC-SCNC: 0 MMOL/L
BASE EXCESS BLDA CALC-SCNC: 0.9 MMOL/L
BASE EXCESS BLDA CALC-SCNC: 1.7 MMOL/L
BASE EXCESS BLDA CALC-SCNC: 2.1 MMOL/L
BASE EXCESS BLDA CALC-SCNC: 2.1 MMOL/L
BASE EXCESS BLDA CALC-SCNC: 2.3 MMOL/L
BASE EXCESS BLDA CALC-SCNC: 3 MMOL/L
BASE EXCESS BLDV CALC-SCNC: 2.1 MMOL/L
BASOPHILS # BLD AUTO: 0 10E9/L (ref 0–0.2)
BASOPHILS # BLD AUTO: 0 10E9/L (ref 0–0.2)
BASOPHILS NFR BLD AUTO: 0 %
BASOPHILS NFR BLD AUTO: 0 %
BILIRUB DIRECT SERPL-MCNC: 0.2 MG/DL (ref 0–0.2)
BILIRUB SERPL-MCNC: 0.7 MG/DL (ref 0.2–1.3)
BILIRUB SERPL-MCNC: 1.2 MG/DL (ref 0.2–1.3)
BILIRUB UR QL STRIP: NEGATIVE
BLD PROD TYP BPU: NORMAL
BLD UNIT ID BPU: 0
BLOOD PRODUCT CODE: NORMAL
BPU ID: NORMAL
BUN SERPL-MCNC: 30 MG/DL (ref 7–30)
BUN SERPL-MCNC: 37 MG/DL (ref 7–30)
BUN SERPL-MCNC: 38 MG/DL (ref 7–30)
CA-I BLD-MCNC: 3.9 MG/DL (ref 4.4–5.2)
CA-I BLD-MCNC: 4.2 MG/DL (ref 4.4–5.2)
CA-I BLD-MCNC: 4.2 MG/DL (ref 4.4–5.2)
CA-I BLD-MCNC: 4.3 MG/DL (ref 4.4–5.2)
CA-I BLD-MCNC: 4.3 MG/DL (ref 4.4–5.2)
CA-I BLD-MCNC: 4.5 MG/DL (ref 4.4–5.2)
CA-I BLD-MCNC: 4.6 MG/DL (ref 4.4–5.2)
CA-I BLD-MCNC: 4.6 MG/DL (ref 4.4–5.2)
CA-I BLD-MCNC: 4.7 MG/DL (ref 4.4–5.2)
CALCIUM SERPL-MCNC: 6.8 MG/DL (ref 8.5–10.1)
CALCIUM SERPL-MCNC: 7.8 MG/DL (ref 8.5–10.1)
CALCIUM SERPL-MCNC: 7.9 MG/DL (ref 8.5–10.1)
CHLORIDE SERPL-SCNC: 110 MMOL/L (ref 94–109)
CHLORIDE SERPL-SCNC: 112 MMOL/L (ref 94–109)
CHLORIDE SERPL-SCNC: 119 MMOL/L (ref 94–109)
CI HYPERCOAGULATION INDEX: 2.9 RATIO (ref 0–3)
CI HYPOCOAGULATION INDEX: 21.9 RATIO (ref 0–3)
CLOT LYSIS 30M P MA LENFR BLD TEG: 0 % (ref 0–8)
CLOT LYSIS 30M P MA LENFR BLD TEG: 1 % (ref 0–8)
CLOT STRENGTH BLD TEG: 14 KD/SC (ref 5.3–13.2)
CLOT STRENGTH BLD TEG: 3.2 KD/SC (ref 5.3–13.2)
CMV IGG SERPL QL IA: NORMAL AI (ref 0–0.8)
CO2 SERPL-SCNC: 24 MMOL/L (ref 20–32)
CO2 SERPL-SCNC: 25 MMOL/L (ref 20–32)
CO2 SERPL-SCNC: 25 MMOL/L (ref 20–32)
COLOR UR AUTO: YELLOW
CREAT SERPL-MCNC: 0.95 MG/DL (ref 0.52–1.04)
CREAT SERPL-MCNC: 1.25 MG/DL (ref 0.52–1.04)
CREAT SERPL-MCNC: 1.3 MG/DL (ref 0.52–1.04)
D DIMER PPP FEU-MCNC: 1.1 UG/ML FEU (ref 0–0.5)
DIFFERENTIAL METHOD BLD: ABNORMAL
DIFFERENTIAL METHOD BLD: ABNORMAL
EOSINOPHIL # BLD AUTO: 0 10E9/L (ref 0–0.7)
EOSINOPHIL # BLD AUTO: 0 10E9/L (ref 0–0.7)
EOSINOPHIL NFR BLD AUTO: 0 %
EOSINOPHIL NFR BLD AUTO: 0.1 %
ERYTHROCYTE [DISTWIDTH] IN BLOOD BY AUTOMATED COUNT: 15.4 % (ref 10–15)
ERYTHROCYTE [DISTWIDTH] IN BLOOD BY AUTOMATED COUNT: 16.2 % (ref 10–15)
ERYTHROCYTE [DISTWIDTH] IN BLOOD BY AUTOMATED COUNT: 16.3 % (ref 10–15)
ERYTHROCYTE [DISTWIDTH] IN BLOOD BY AUTOMATED COUNT: 16.6 % (ref 10–15)
FIBRINOGEN PPP-MCNC: 265 MG/DL (ref 200–420)
FIBRINOGEN PPP-MCNC: 538 MG/DL (ref 200–420)
FIBRINOGEN PPP-MCNC: 618 MG/DL (ref 200–420)
GFR SERPL CREATININE-BSD FRML MDRD: 42 ML/MIN/1.7M2
GFR SERPL CREATININE-BSD FRML MDRD: 44 ML/MIN/1.7M2
GFR SERPL CREATININE-BSD FRML MDRD: 61 ML/MIN/1.7M2
GLUCOSE BLD-MCNC: 128 MG/DL (ref 70–99)
GLUCOSE BLD-MCNC: 180 MG/DL (ref 70–99)
GLUCOSE BLD-MCNC: 180 MG/DL (ref 70–99)
GLUCOSE BLD-MCNC: 183 MG/DL (ref 70–99)
GLUCOSE BLD-MCNC: 197 MG/DL (ref 70–99)
GLUCOSE BLD-MCNC: 208 MG/DL (ref 70–99)
GLUCOSE BLD-MCNC: 214 MG/DL (ref 70–99)
GLUCOSE BLDC GLUCOMTR-MCNC: 109 MG/DL (ref 70–99)
GLUCOSE BLDC GLUCOMTR-MCNC: 109 MG/DL (ref 70–99)
GLUCOSE BLDC GLUCOMTR-MCNC: 117 MG/DL (ref 70–99)
GLUCOSE BLDC GLUCOMTR-MCNC: 120 MG/DL (ref 70–99)
GLUCOSE BLDC GLUCOMTR-MCNC: 123 MG/DL (ref 70–99)
GLUCOSE BLDC GLUCOMTR-MCNC: 162 MG/DL (ref 70–99)
GLUCOSE BLDC GLUCOMTR-MCNC: 187 MG/DL (ref 70–99)
GLUCOSE BLDC GLUCOMTR-MCNC: 209 MG/DL (ref 70–99)
GLUCOSE SERPL-MCNC: 118 MG/DL (ref 70–99)
GLUCOSE SERPL-MCNC: 125 MG/DL (ref 70–99)
GLUCOSE SERPL-MCNC: 232 MG/DL (ref 70–99)
GLUCOSE UR STRIP-MCNC: NEGATIVE MG/DL
GRAM STN SPEC: NORMAL
GRAM STN SPEC: NORMAL
HBA1C MFR BLD: 5.5 % (ref 4.3–6)
HBA1C MFR BLD: NORMAL % (ref 4.3–6)
HBV CORE AB SERPL QL IA: NONREACTIVE
HBV SURFACE AB SERPL IA-ACNC: 0.41 M[IU]/ML
HBV SURFACE AG SERPL QL IA: NONREACTIVE
HCO3 BLD-SCNC: 18 MMOL/L (ref 21–28)
HCO3 BLD-SCNC: 19 MMOL/L (ref 21–28)
HCO3 BLD-SCNC: 21 MMOL/L (ref 21–28)
HCO3 BLD-SCNC: 22 MMOL/L (ref 21–28)
HCO3 BLD-SCNC: 22 MMOL/L (ref 21–28)
HCO3 BLD-SCNC: 26 MMOL/L (ref 21–28)
HCO3 BLD-SCNC: 27 MMOL/L (ref 21–28)
HCO3 BLDA-SCNC: 25 MMOL/L (ref 21–28)
HCO3 BLDV-SCNC: 26 MMOL/L (ref 21–28)
HCT VFR BLD AUTO: 21.3 % (ref 35–47)
HCT VFR BLD AUTO: 23.4 % (ref 35–47)
HCT VFR BLD AUTO: 24 % (ref 35–47)
HCT VFR BLD AUTO: 29.3 % (ref 35–47)
HCV RNA SERPL NAA+PROBE-ACNC: NORMAL [IU]/ML
HCV RNA SERPL NAA+PROBE-LOG IU: NORMAL LOG IU/ML
HGB BLD-MCNC: 5.5 G/DL (ref 11.7–15.7)
HGB BLD-MCNC: 6.8 G/DL (ref 11.7–15.7)
HGB BLD-MCNC: 7.1 G/DL (ref 11.7–15.7)
HGB BLD-MCNC: 7.5 G/DL (ref 11.7–15.7)
HGB BLD-MCNC: 7.8 G/DL (ref 11.7–15.7)
HGB BLD-MCNC: 7.9 G/DL (ref 11.7–15.7)
HGB BLD-MCNC: 8.1 G/DL (ref 11.7–15.7)
HGB BLD-MCNC: 8.2 G/DL (ref 11.7–15.7)
HGB BLD-MCNC: 8.6 G/DL (ref 11.7–15.7)
HGB BLD-MCNC: 9.2 G/DL (ref 11.7–15.7)
HGB BLD-MCNC: 9.6 G/DL (ref 11.7–15.7)
HGB FREE PLAS-MCNC: <30 MG/DL
HGB UR QL STRIP: ABNORMAL
HIV 1+2 AB+HIV1 P24 AG SERPL QL IA: NONREACTIVE
HLA FINAL CROSSMATCH RECIPIENT: NORMAL
IMM GRANULOCYTES # BLD: 0 10E9/L (ref 0–0.4)
IMM GRANULOCYTES # BLD: 0.1 10E9/L (ref 0–0.4)
IMM GRANULOCYTES NFR BLD: 0.3 %
IMM GRANULOCYTES NFR BLD: 0.4 %
INR PPP: 1.37 (ref 0.86–1.14)
INR PPP: 1.37 (ref 0.86–1.14)
INR PPP: 1.51 (ref 0.86–1.14)
INR PPP: 1.86 (ref 0.86–1.14)
INR PPP: 1.93 (ref 0.86–1.14)
INTERPRETATION ECG - MUSE: NORMAL
K TIME TO SPEC CLOT STRENGTH: 0.9 MIN (ref 1–3)
K TIME TO SPEC CLOT STRENGTH: 13.8 MIN (ref 1–3)
KETONES UR STRIP-MCNC: 5 MG/DL
LACTATE BLD-SCNC: 0.5 MMOL/L (ref 0.7–2)
LACTATE BLD-SCNC: 0.6 MMOL/L (ref 0.7–2)
LACTATE BLD-SCNC: 0.7 MMOL/L (ref 0.7–2)
LACTATE BLD-SCNC: 1.3 MMOL/L (ref 0.7–2)
LACTATE BLD-SCNC: 1.8 MMOL/L (ref 0.7–2)
LEUKOCYTE ESTERASE UR QL STRIP: NEGATIVE
LMWH PPP CHRO-ACNC: 0.58 IU/ML
LMWH PPP CHRO-ACNC: 0.6 IU/ML
LY60 LYSIS AT 60 MINUTES: 0.8 % (ref 0–15)
LYMPHOCYTES # BLD AUTO: 0.2 10E9/L (ref 0.8–5.3)
LYMPHOCYTES # BLD AUTO: 0.4 10E9/L (ref 0.8–5.3)
LYMPHOCYTES NFR BLD AUTO: 2 %
LYMPHOCYTES NFR BLD AUTO: 3.2 %
Lab: NORMAL
MA MAXIMUM CLOT STRENGTH: 39.1 MM (ref 55–74)
MA MAXIMUM CLOT STRENGTH: 73.6 MM (ref 55–74)
MAGNESIUM SERPL-MCNC: 1.9 MG/DL (ref 1.6–2.3)
MAGNESIUM SERPL-MCNC: 2.9 MG/DL (ref 1.6–2.3)
MCH RBC QN AUTO: 30.6 PG (ref 26.5–33)
MCH RBC QN AUTO: 31.5 PG (ref 26.5–33)
MCH RBC QN AUTO: 32.4 PG (ref 26.5–33)
MCH RBC QN AUTO: 32.9 PG (ref 26.5–33)
MCHC RBC AUTO-ENTMCNC: 31.9 G/DL (ref 31.5–36.5)
MCHC RBC AUTO-ENTMCNC: 32.1 G/DL (ref 31.5–36.5)
MCHC RBC AUTO-ENTMCNC: 32.5 G/DL (ref 31.5–36.5)
MCHC RBC AUTO-ENTMCNC: 32.8 G/DL (ref 31.5–36.5)
MCV RBC AUTO: 100 FL (ref 78–100)
MCV RBC AUTO: 103 FL (ref 78–100)
MCV RBC AUTO: 96 FL (ref 78–100)
MCV RBC AUTO: 96 FL (ref 78–100)
MONOCYTES # BLD AUTO: 0.6 10E9/L (ref 0–1.3)
MONOCYTES # BLD AUTO: 0.8 10E9/L (ref 0–1.3)
MONOCYTES NFR BLD AUTO: 5.2 %
MONOCYTES NFR BLD AUTO: 7.5 %
MRSA DNA SPEC QL NAA+PROBE: NEGATIVE
MUCOUS THREADS #/AREA URNS LPF: PRESENT /LPF
NEUTROPHILS # BLD AUTO: 10.4 10E9/L (ref 1.6–8.3)
NEUTROPHILS # BLD AUTO: 9.3 10E9/L (ref 1.6–8.3)
NEUTROPHILS NFR BLD AUTO: 90.2 %
NEUTROPHILS NFR BLD AUTO: 91.1 %
NITRATE UR QL: NEGATIVE
NRBC # BLD AUTO: 0 10*3/UL
NRBC # BLD AUTO: 0 10*3/UL
NRBC BLD AUTO-RTO: 0 /100
NRBC BLD AUTO-RTO: 0 /100
O2/TOTAL GAS SETTING VFR VENT: 100 %
O2/TOTAL GAS SETTING VFR VENT: 40 %
O2/TOTAL GAS SETTING VFR VENT: 80 %
OXYHGB MFR BLD: 91 % (ref 92–100)
OXYHGB MFR BLD: 96 % (ref 92–100)
OXYHGB MFR BLD: 96 % (ref 92–100)
OXYHGB MFR BLD: 97 % (ref 92–100)
OXYHGB MFR BLD: 97 % (ref 92–100)
OXYHGB MFR BLD: 98 % (ref 92–100)
OXYHGB MFR BLDA: 97 % (ref 75–100)
OXYHGB MFR BLDV: 79 %
PCO2 BLD: 25 MM HG (ref 35–45)
PCO2 BLD: 34 MM HG (ref 35–45)
PCO2 BLD: 35 MM HG (ref 35–45)
PCO2 BLD: 37 MM HG (ref 35–45)
PCO2 BLD: 37 MM HG (ref 35–45)
PCO2 BLD: 38 MM HG (ref 35–45)
PCO2 BLD: 39 MM HG (ref 35–45)
PCO2 BLD: 45 MM HG (ref 35–45)
PCO2 BLD: 46 MM HG (ref 35–45)
PCO2 BLD: 47 MM HG (ref 35–45)
PCO2 BLD: 50 MM HG (ref 35–45)
PCO2 BLD: 50 MM HG (ref 35–45)
PCO2 BLDA: 35 MM HG (ref 35–45)
PCO2 BLDV: 39 MM HG (ref 40–50)
PH BLD: 7.2 PH (ref 7.35–7.45)
PH BLD: 7.25 PH (ref 7.35–7.45)
PH BLD: 7.25 PH (ref 7.35–7.45)
PH BLD: 7.29 PH (ref 7.35–7.45)
PH BLD: 7.34 PH (ref 7.35–7.45)
PH BLD: 7.36 PH (ref 7.35–7.45)
PH BLD: 7.36 PH (ref 7.35–7.45)
PH BLD: 7.39 PH (ref 7.35–7.45)
PH BLD: 7.44 PH (ref 7.35–7.45)
PH BLD: 7.46 PH (ref 7.35–7.45)
PH BLD: 7.46 PH (ref 7.35–7.45)
PH BLD: 7.48 PH (ref 7.35–7.45)
PH BLD: 7.48 PH (ref 7.35–7.45)
PH BLD: 7.49 PH (ref 7.35–7.45)
PH BLDA: 7.46 PH (ref 7.35–7.45)
PH BLDV: 7.44 PH (ref 7.32–7.43)
PH UR STRIP: 5.5 PH (ref 5–7)
PHOSPHATE SERPL-MCNC: 3.9 MG/DL (ref 2.5–4.5)
PHOSPHATE SERPL-MCNC: 3.9 MG/DL (ref 2.5–4.5)
PLATELET # BLD AUTO: 122 10E9/L (ref 150–450)
PLATELET # BLD AUTO: 151 10E9/L (ref 150–450)
PLATELET # BLD AUTO: 165 10E9/L (ref 150–450)
PLATELET # BLD AUTO: 167 10E9/L (ref 150–450)
PLATELET # BLD AUTO: 172 10E9/L (ref 150–450)
PLATELET # BLD EST: ABNORMAL 10*3/UL
PO2 BLD: 107 MM HG (ref 80–105)
PO2 BLD: 125 MM HG (ref 80–105)
PO2 BLD: 131 MM HG (ref 80–105)
PO2 BLD: 151 MM HG (ref 80–105)
PO2 BLD: 196 MM HG (ref 80–105)
PO2 BLD: 196 MM HG (ref 80–105)
PO2 BLD: 219 MM HG (ref 80–105)
PO2 BLD: 224 MM HG (ref 80–105)
PO2 BLD: 256 MM HG (ref 80–105)
PO2 BLD: 412 MM HG (ref 80–105)
PO2 BLD: 413 MM HG (ref 80–105)
PO2 BLD: 425 MM HG (ref 80–105)
PO2 BLD: 64 MM HG (ref 80–105)
PO2 BLD: 98 MM HG (ref 80–105)
PO2 BLDA: 209 MM HG (ref 80–105)
PO2 BLDV: 46 MM HG (ref 25–47)
POTASSIUM BLD-SCNC: 3.1 MMOL/L (ref 3.4–5.3)
POTASSIUM BLD-SCNC: 3.3 MMOL/L (ref 3.4–5.3)
POTASSIUM BLD-SCNC: 3.3 MMOL/L (ref 3.4–5.3)
POTASSIUM BLD-SCNC: 3.4 MMOL/L (ref 3.4–5.3)
POTASSIUM BLD-SCNC: 3.6 MMOL/L (ref 3.4–5.3)
POTASSIUM BLD-SCNC: 3.7 MMOL/L (ref 3.4–5.3)
POTASSIUM BLD-SCNC: 4 MMOL/L (ref 3.4–5.3)
POTASSIUM SERPL-SCNC: 3.4 MMOL/L (ref 3.4–5.3)
POTASSIUM SERPL-SCNC: 4.2 MMOL/L (ref 3.4–5.3)
POTASSIUM SERPL-SCNC: 4.3 MMOL/L (ref 3.4–5.3)
PROT SERPL-MCNC: 3.7 G/DL (ref 6.8–8.8)
PROT SERPL-MCNC: 5.6 G/DL (ref 6.8–8.8)
R TIME UNTIL CLOT FORMS: 22.9 MIN (ref 4–9)
R TIME UNTIL CLOT FORMS: 5.7 MIN (ref 4–9)
RBC # BLD AUTO: 2.07 10E12/L (ref 3.8–5.2)
RBC # BLD AUTO: 2.41 10E12/L (ref 3.8–5.2)
RBC # BLD AUTO: 2.45 10E12/L (ref 3.8–5.2)
RBC # BLD AUTO: 3.05 10E12/L (ref 3.8–5.2)
RBC #/AREA URNS AUTO: 4 /HPF (ref 0–2)
SODIUM BLD-SCNC: 144 MMOL/L (ref 133–144)
SODIUM SERPL-SCNC: 143 MMOL/L (ref 133–144)
SODIUM SERPL-SCNC: 144 MMOL/L (ref 133–144)
SODIUM SERPL-SCNC: 150 MMOL/L (ref 133–144)
SOURCE: ABNORMAL
SP GR UR STRIP: 1.03 (ref 1–1.03)
SPECIMEN SOURCE: NORMAL
SQUAMOUS #/AREA URNS AUTO: 1 /HPF (ref 0–1)
TRANS CELLS #/AREA URNS HPF: 1 /HPF (ref 0–1)
TRANSFUSION STATUS PATIENT QL: NORMAL
UROBILINOGEN UR STRIP-MCNC: NORMAL MG/DL (ref 0–2)
WBC # BLD AUTO: 10.3 10E9/L (ref 4–11)
WBC # BLD AUTO: 10.4 10E9/L (ref 4–11)
WBC # BLD AUTO: 11.3 10E9/L (ref 4–11)
WBC # BLD AUTO: 8.1 10E9/L (ref 4–11)
WBC #/AREA URNS AUTO: 4 /HPF (ref 0–5)

## 2018-03-01 PROCEDURE — 27210447 ZZH PACK CELL SAVER CSP: Performed by: SURGERY

## 2018-03-01 PROCEDURE — 85730 THROMBOPLASTIN TIME PARTIAL: CPT | Performed by: SURGERY

## 2018-03-01 PROCEDURE — 83605 ASSAY OF LACTIC ACID: CPT

## 2018-03-01 PROCEDURE — 86696 HERPES SIMPLEX TYPE 2 TEST: CPT | Performed by: SURGERY

## 2018-03-01 PROCEDURE — 85384 FIBRINOGEN ACTIVITY: CPT | Performed by: STUDENT IN AN ORGANIZED HEALTH CARE EDUCATION/TRAINING PROGRAM

## 2018-03-01 PROCEDURE — P9059 PLASMA, FRZ BETWEEN 8-24HOUR: HCPCS | Performed by: THORACIC SURGERY (CARDIOTHORACIC VASCULAR SURGERY)

## 2018-03-01 PROCEDURE — 27210429 ZZH NUTRITION PRODUCT INTERMEDIATE LITER

## 2018-03-01 PROCEDURE — 25000128 H RX IP 250 OP 636: Performed by: SURGERY

## 2018-03-01 PROCEDURE — 25000125 ZZHC RX 250: Performed by: NURSE ANESTHETIST, CERTIFIED REGISTERED

## 2018-03-01 PROCEDURE — 40000014 ZZH STATISTIC ARTERIAL MONITORING DAILY

## 2018-03-01 PROCEDURE — 94645 CONT INHLJ TX EACH ADDL HOUR: CPT

## 2018-03-01 PROCEDURE — 25000128 H RX IP 250 OP 636: Performed by: INTERNAL MEDICINE

## 2018-03-01 PROCEDURE — 25000132 ZZH RX MED GY IP 250 OP 250 PS 637: Performed by: SURGERY

## 2018-03-01 PROCEDURE — 40000986 XR CHEST PORT 1 VW

## 2018-03-01 PROCEDURE — 83051 HEMOGLOBIN PLASMA: CPT | Performed by: SURGERY

## 2018-03-01 PROCEDURE — 85347 COAGULATION TIME ACTIVATED: CPT

## 2018-03-01 PROCEDURE — 83605 ASSAY OF LACTIC ACID: CPT | Performed by: SURGERY

## 2018-03-01 PROCEDURE — 25000128 H RX IP 250 OP 636: Performed by: NURSE ANESTHETIST, CERTIFIED REGISTERED

## 2018-03-01 PROCEDURE — 87517 HEPATITIS B DNA QUANT: CPT | Performed by: SURGERY

## 2018-03-01 PROCEDURE — 87040 BLOOD CULTURE FOR BACTERIA: CPT | Performed by: INTERNAL MEDICINE

## 2018-03-01 PROCEDURE — 40000281 ZZH STATISTIC TRANSPORT TIME EA 15 MIN

## 2018-03-01 PROCEDURE — 25000128 H RX IP 250 OP 636: Performed by: STUDENT IN AN ORGANIZED HEALTH CARE EDUCATION/TRAINING PROGRAM

## 2018-03-01 PROCEDURE — 41000019 ZZH PERA-PERFUSION EACH ADDTL 15 MIN: Performed by: SURGERY

## 2018-03-01 PROCEDURE — 0BYM0Z0 TRANSPLANTATION OF BILATERAL LUNGS, ALLOGENEIC, OPEN APPROACH: ICD-10-PCS | Performed by: SURGERY

## 2018-03-01 PROCEDURE — 86900 BLOOD TYPING SEROLOGIC ABO: CPT | Performed by: SURGERY

## 2018-03-01 PROCEDURE — 82330 ASSAY OF CALCIUM: CPT | Performed by: ANESTHESIOLOGY

## 2018-03-01 PROCEDURE — 87522 HEPATITIS C REVRS TRNSCRPJ: CPT | Performed by: SURGERY

## 2018-03-01 PROCEDURE — 84295 ASSAY OF SERUM SODIUM: CPT | Performed by: SURGERY

## 2018-03-01 PROCEDURE — 27210468 ZZH SENSOR SOMATIC ADULT

## 2018-03-01 PROCEDURE — 85520 HEPARIN ASSAY: CPT | Performed by: SURGERY

## 2018-03-01 PROCEDURE — 27210995 ZZH RX 272: Performed by: SURGERY

## 2018-03-01 PROCEDURE — 85396 CLOTTING ASSAY WHOLE BLOOD: CPT | Performed by: INTERNAL MEDICINE

## 2018-03-01 PROCEDURE — 85027 COMPLETE CBC AUTOMATED: CPT | Performed by: THORACIC SURGERY (CARDIOTHORACIC VASCULAR SURGERY)

## 2018-03-01 PROCEDURE — 84132 ASSAY OF SERUM POTASSIUM: CPT | Performed by: SURGERY

## 2018-03-01 PROCEDURE — 85384 FIBRINOGEN ACTIVITY: CPT | Performed by: INTERNAL MEDICINE

## 2018-03-01 PROCEDURE — 87340 HEPATITIS B SURFACE AG IA: CPT | Performed by: SURGERY

## 2018-03-01 PROCEDURE — 84132 ASSAY OF SERUM POTASSIUM: CPT | Performed by: ANESTHESIOLOGY

## 2018-03-01 PROCEDURE — 81001 URINALYSIS AUTO W/SCOPE: CPT | Performed by: SURGERY

## 2018-03-01 PROCEDURE — 84100 ASSAY OF PHOSPHORUS: CPT | Performed by: SURGERY

## 2018-03-01 PROCEDURE — 25000125 ZZHC RX 250: Performed by: INTERNAL MEDICINE

## 2018-03-01 PROCEDURE — 87389 HIV-1 AG W/HIV-1&-2 AB AG IA: CPT | Performed by: SURGERY

## 2018-03-01 PROCEDURE — 85379 FIBRIN DEGRADATION QUANT: CPT | Performed by: SURGERY

## 2018-03-01 PROCEDURE — 82330 ASSAY OF CALCIUM: CPT | Performed by: SURGERY

## 2018-03-01 PROCEDURE — 00000146 ZZHCL STATISTIC GLUCOSE BY METER IP

## 2018-03-01 PROCEDURE — 82784 ASSAY IGA/IGD/IGG/IGM EACH: CPT | Performed by: SURGERY

## 2018-03-01 PROCEDURE — 80053 COMPREHEN METABOLIC PANEL: CPT | Performed by: THORACIC SURGERY (CARDIOTHORACIC VASCULAR SURGERY)

## 2018-03-01 PROCEDURE — 33949 ECMO/ECLS DAILY MGMT ARTERY: CPT

## 2018-03-01 PROCEDURE — 82330 ASSAY OF CALCIUM: CPT

## 2018-03-01 PROCEDURE — 86901 BLOOD TYPING SEROLOGIC RH(D): CPT | Performed by: SURGERY

## 2018-03-01 PROCEDURE — 83735 ASSAY OF MAGNESIUM: CPT | Performed by: SURGERY

## 2018-03-01 PROCEDURE — 85004 AUTOMATED DIFF WBC COUNT: CPT | Performed by: THORACIC SURGERY (CARDIOTHORACIC VASCULAR SURGERY)

## 2018-03-01 PROCEDURE — 82803 BLOOD GASES ANY COMBINATION: CPT | Performed by: SURGERY

## 2018-03-01 PROCEDURE — 86850 RBC ANTIBODY SCREEN: CPT | Performed by: SURGERY

## 2018-03-01 PROCEDURE — 93010 ELECTROCARDIOGRAM REPORT: CPT | Mod: 77 | Performed by: INTERNAL MEDICINE

## 2018-03-01 PROCEDURE — 85730 THROMBOPLASTIN TIME PARTIAL: CPT | Performed by: THORACIC SURGERY (CARDIOTHORACIC VASCULAR SURGERY)

## 2018-03-01 PROCEDURE — 25000128 H RX IP 250 OP 636: Performed by: THORACIC SURGERY (CARDIOTHORACIC VASCULAR SURGERY)

## 2018-03-01 PROCEDURE — 94003 VENT MGMT INPAT SUBQ DAY: CPT

## 2018-03-01 PROCEDURE — 27210995 ZZH RX 272: Performed by: STUDENT IN AN ORGANIZED HEALTH CARE EDUCATION/TRAINING PROGRAM

## 2018-03-01 PROCEDURE — 25000131 ZZH RX MED GY IP 250 OP 636 PS 637: Performed by: INTERNAL MEDICINE

## 2018-03-01 PROCEDURE — 81200008 ZZH ACQUISITION LUNG CADAVER

## 2018-03-01 PROCEDURE — 40000196 ZZH STATISTIC RAPCV CVP MONITORING

## 2018-03-01 PROCEDURE — 87205 SMEAR GRAM STAIN: CPT | Performed by: SURGERY

## 2018-03-01 PROCEDURE — 87070 CULTURE OTHR SPECIMN AEROBIC: CPT | Performed by: SURGERY

## 2018-03-01 PROCEDURE — 37000008 ZZH ANESTHESIA TECHNICAL FEE, 1ST 30 MIN: Performed by: SURGERY

## 2018-03-01 PROCEDURE — 37000009 ZZH ANESTHESIA TECHNICAL FEE, EACH ADDTL 15 MIN: Performed by: SURGERY

## 2018-03-01 PROCEDURE — 85610 PROTHROMBIN TIME: CPT | Performed by: THORACIC SURGERY (CARDIOTHORACIC VASCULAR SURGERY)

## 2018-03-01 PROCEDURE — 86644 CMV ANTIBODY: CPT | Performed by: SURGERY

## 2018-03-01 PROCEDURE — 84999 UNLISTED CHEMISTRY PROCEDURE: CPT | Performed by: SURGERY

## 2018-03-01 PROCEDURE — 80053 COMPREHEN METABOLIC PANEL: CPT | Performed by: SURGERY

## 2018-03-01 PROCEDURE — P9059 PLASMA, FRZ BETWEEN 8-24HOUR: HCPCS | Performed by: INTERNAL MEDICINE

## 2018-03-01 PROCEDURE — 82803 BLOOD GASES ANY COMBINATION: CPT | Performed by: THORACIC SURGERY (CARDIOTHORACIC VASCULAR SURGERY)

## 2018-03-01 PROCEDURE — 25000131 ZZH RX MED GY IP 250 OP 636 PS 637: Performed by: STUDENT IN AN ORGANIZED HEALTH CARE EDUCATION/TRAINING PROGRAM

## 2018-03-01 PROCEDURE — 25000132 ZZH RX MED GY IP 250 OP 250 PS 637: Performed by: ANESTHESIOLOGY

## 2018-03-01 PROCEDURE — 83605 ASSAY OF LACTIC ACID: CPT | Performed by: THORACIC SURGERY (CARDIOTHORACIC VASCULAR SURGERY)

## 2018-03-01 PROCEDURE — P9041 ALBUMIN (HUMAN),5%, 50ML: HCPCS

## 2018-03-01 PROCEDURE — 25000128 H RX IP 250 OP 636

## 2018-03-01 PROCEDURE — 40000344 ZZHCL STATISTIC THAWING COMPONENT: Performed by: INTERNAL MEDICINE

## 2018-03-01 PROCEDURE — 85610 PROTHROMBIN TIME: CPT | Performed by: INTERNAL MEDICINE

## 2018-03-01 PROCEDURE — 36415 COLL VENOUS BLD VENIPUNCTURE: CPT | Performed by: INTERNAL MEDICINE

## 2018-03-01 PROCEDURE — 82248 BILIRUBIN DIRECT: CPT | Performed by: SURGERY

## 2018-03-01 PROCEDURE — 85049 AUTOMATED PLATELET COUNT: CPT | Performed by: INTERNAL MEDICINE

## 2018-03-01 PROCEDURE — 87641 MR-STAPH DNA AMP PROBE: CPT | Performed by: THORACIC SURGERY (CARDIOTHORACIC VASCULAR SURGERY)

## 2018-03-01 PROCEDURE — 25000125 ZZHC RX 250: Performed by: THORACIC SURGERY (CARDIOTHORACIC VASCULAR SURGERY)

## 2018-03-01 PROCEDURE — 85610 PROTHROMBIN TIME: CPT | Performed by: SURGERY

## 2018-03-01 PROCEDURE — 84132 ASSAY OF SERUM POTASSIUM: CPT

## 2018-03-01 PROCEDURE — 40000344 ZZHCL STATISTIC THAWING COMPONENT: Performed by: THORACIC SURGERY (CARDIOTHORACIC VASCULAR SURGERY)

## 2018-03-01 PROCEDURE — 25000131 ZZH RX MED GY IP 250 OP 636 PS 637: Performed by: THORACIC SURGERY (CARDIOTHORACIC VASCULAR SURGERY)

## 2018-03-01 PROCEDURE — 27210794 ZZH OR GENERAL SUPPLY STERILE: Performed by: SURGERY

## 2018-03-01 PROCEDURE — 82947 ASSAY GLUCOSE BLOOD QUANT: CPT

## 2018-03-01 PROCEDURE — 87015 SPECIMEN INFECT AGNT CONCNTJ: CPT | Performed by: SURGERY

## 2018-03-01 PROCEDURE — 36000074 ZZH SURGERY LEVEL 6 1ST 30 MIN - UMMC: Performed by: SURGERY

## 2018-03-01 PROCEDURE — 83605 ASSAY OF LACTIC ACID: CPT | Performed by: ANESTHESIOLOGY

## 2018-03-01 PROCEDURE — 83735 ASSAY OF MAGNESIUM: CPT | Performed by: THORACIC SURGERY (CARDIOTHORACIC VASCULAR SURGERY)

## 2018-03-01 PROCEDURE — 85025 COMPLETE CBC W/AUTO DIFF WBC: CPT | Performed by: SURGERY

## 2018-03-01 PROCEDURE — 99291 CRITICAL CARE FIRST HOUR: CPT | Mod: GC | Performed by: ANESTHESIOLOGY

## 2018-03-01 PROCEDURE — 40000275 ZZH STATISTIC RCP TIME EA 10 MIN

## 2018-03-01 PROCEDURE — 86706 HEP B SURFACE ANTIBODY: CPT | Performed by: SURGERY

## 2018-03-01 PROCEDURE — 83036 HEMOGLOBIN GLYCOSYLATED A1C: CPT | Performed by: THORACIC SURGERY (CARDIOTHORACIC VASCULAR SURGERY)

## 2018-03-01 PROCEDURE — 85384 FIBRINOGEN ACTIVITY: CPT | Performed by: SURGERY

## 2018-03-01 PROCEDURE — 82947 ASSAY GLUCOSE BLOOD QUANT: CPT | Performed by: ANESTHESIOLOGY

## 2018-03-01 PROCEDURE — 87077 CULTURE AEROBIC IDENTIFY: CPT | Performed by: SURGERY

## 2018-03-01 PROCEDURE — 85027 COMPLETE CBC AUTOMATED: CPT | Performed by: INTERNAL MEDICINE

## 2018-03-01 PROCEDURE — 86825 HLA X-MATH NON-CYTOTOXIC: CPT | Performed by: INTERNAL MEDICINE

## 2018-03-01 PROCEDURE — 20000004 ZZH R&B ICU UMMC

## 2018-03-01 PROCEDURE — 93010 ELECTROCARDIOGRAM REPORT: CPT | Performed by: INTERNAL MEDICINE

## 2018-03-01 PROCEDURE — 87640 STAPH A DNA AMP PROBE: CPT | Performed by: THORACIC SURGERY (CARDIOTHORACIC VASCULAR SURGERY)

## 2018-03-01 PROCEDURE — 87206 SMEAR FLUORESCENT/ACID STAI: CPT | Performed by: SURGERY

## 2018-03-01 PROCEDURE — 86695 HERPES SIMPLEX TYPE 1 TEST: CPT | Performed by: SURGERY

## 2018-03-01 PROCEDURE — 88309 TISSUE EXAM BY PATHOLOGIST: CPT | Performed by: SURGERY

## 2018-03-01 PROCEDURE — 82803 BLOOD GASES ANY COMBINATION: CPT | Performed by: ANESTHESIOLOGY

## 2018-03-01 PROCEDURE — 25000565 ZZH ISOFLURANE, EA 15 MIN: Performed by: SURGERY

## 2018-03-01 PROCEDURE — 87536 HIV-1 QUANT&REVRSE TRNSCRPJ: CPT | Performed by: SURGERY

## 2018-03-01 PROCEDURE — 71045 X-RAY EXAM CHEST 1 VIEW: CPT

## 2018-03-01 PROCEDURE — 36000076 ZZH SURGERY LEVEL 6 EA 15 ADDTL MIN - UMMC: Performed by: SURGERY

## 2018-03-01 PROCEDURE — 86665 EPSTEIN-BARR CAPSID VCA: CPT | Performed by: SURGERY

## 2018-03-01 PROCEDURE — 82947 ASSAY GLUCOSE BLOOD QUANT: CPT | Performed by: SURGERY

## 2018-03-01 PROCEDURE — 93005 ELECTROCARDIOGRAM TRACING: CPT

## 2018-03-01 PROCEDURE — 25000125 ZZHC RX 250: Performed by: STUDENT IN AN ORGANIZED HEALTH CARE EDUCATION/TRAINING PROGRAM

## 2018-03-01 PROCEDURE — 87496 CYTOMEG DNA AMP PROBE: CPT | Performed by: SURGERY

## 2018-03-01 PROCEDURE — P9037 PLATE PHERES LEUKOREDU IRRAD: HCPCS

## 2018-03-01 PROCEDURE — 80048 BASIC METABOLIC PNL TOTAL CA: CPT | Performed by: SURGERY

## 2018-03-01 PROCEDURE — 82803 BLOOD GASES ANY COMBINATION: CPT

## 2018-03-01 PROCEDURE — 85027 COMPLETE CBC AUTOMATED: CPT | Performed by: SURGERY

## 2018-03-01 PROCEDURE — 85730 THROMBOPLASTIN TIME PARTIAL: CPT | Performed by: INTERNAL MEDICINE

## 2018-03-01 PROCEDURE — 25000132 ZZH RX MED GY IP 250 OP 250 PS 637: Performed by: THORACIC SURGERY (CARDIOTHORACIC VASCULAR SURGERY)

## 2018-03-01 PROCEDURE — 84295 ASSAY OF SERUM SODIUM: CPT | Performed by: ANESTHESIOLOGY

## 2018-03-01 PROCEDURE — 85300 ANTITHROMBIN III ACTIVITY: CPT | Performed by: SURGERY

## 2018-03-01 PROCEDURE — 84100 ASSAY OF PHOSPHORUS: CPT | Performed by: THORACIC SURGERY (CARDIOTHORACIC VASCULAR SURGERY)

## 2018-03-01 PROCEDURE — 86704 HEP B CORE ANTIBODY TOTAL: CPT | Performed by: SURGERY

## 2018-03-01 PROCEDURE — 41000018 ZZH PER-PERFUSION 1ST 30 MIN: Performed by: SURGERY

## 2018-03-01 PROCEDURE — 84295 ASSAY OF SERUM SODIUM: CPT

## 2018-03-01 PROCEDURE — 87186 SC STD MICRODIL/AGAR DIL: CPT | Performed by: SURGERY

## 2018-03-01 PROCEDURE — 87205 SMEAR GRAM STAIN: CPT | Performed by: STUDENT IN AN ORGANIZED HEALTH CARE EDUCATION/TRAINING PROGRAM

## 2018-03-01 PROCEDURE — 87102 FUNGUS ISOLATION CULTURE: CPT | Performed by: SURGERY

## 2018-03-01 PROCEDURE — 25000132 ZZH RX MED GY IP 250 OP 250 PS 637: Performed by: INTERNAL MEDICINE

## 2018-03-01 PROCEDURE — 87116 MYCOBACTERIA CULTURE: CPT | Performed by: SURGERY

## 2018-03-01 PROCEDURE — 87070 CULTURE OTHR SPECIMN AEROBIC: CPT | Performed by: STUDENT IN AN ORGANIZED HEALTH CARE EDUCATION/TRAINING PROGRAM

## 2018-03-01 PROCEDURE — P9016 RBC LEUKOCYTES REDUCED: HCPCS | Performed by: INTERNAL MEDICINE

## 2018-03-01 PROCEDURE — 82805 BLOOD GASES W/O2 SATURATION: CPT | Performed by: SURGERY

## 2018-03-01 PROCEDURE — 85610 PROTHROMBIN TIME: CPT | Performed by: STUDENT IN AN ORGANIZED HEALTH CARE EDUCATION/TRAINING PROGRAM

## 2018-03-01 PROCEDURE — 25000132 ZZH RX MED GY IP 250 OP 250 PS 637: Performed by: STUDENT IN AN ORGANIZED HEALTH CARE EDUCATION/TRAINING PROGRAM

## 2018-03-01 PROCEDURE — 87640 STAPH A DNA AMP PROBE: CPT | Performed by: SURGERY

## 2018-03-01 PROCEDURE — P9041 ALBUMIN (HUMAN),5%, 50ML: HCPCS | Performed by: NURSE ANESTHETIST, CERTIFIED REGISTERED

## 2018-03-01 PROCEDURE — 83036 HEMOGLOBIN GLYCOSYLATED A1C: CPT | Performed by: SURGERY

## 2018-03-01 PROCEDURE — 82805 BLOOD GASES W/O2 SATURATION: CPT | Performed by: THORACIC SURGERY (CARDIOTHORACIC VASCULAR SURGERY)

## 2018-03-01 RX ORDER — PROTAMINE SULFATE 10 MG/ML
INJECTION, SOLUTION INTRAVENOUS PRN
Status: DISCONTINUED | OUTPATIENT
Start: 2018-03-01 | End: 2018-03-01

## 2018-03-01 RX ORDER — MYCOPHENOLATE MOFETIL 250 MG/1
1500 CAPSULE ORAL
Status: DISCONTINUED | OUTPATIENT
Start: 2018-03-01 | End: 2018-03-24 | Stop reason: HOSPADM

## 2018-03-01 RX ORDER — PIPERACILLIN SODIUM, TAZOBACTAM SODIUM 4; .5 G/20ML; G/20ML
4.5 INJECTION, POWDER, LYOPHILIZED, FOR SOLUTION INTRAVENOUS
Status: DISCONTINUED | OUTPATIENT
Start: 2018-03-01 | End: 2018-03-01 | Stop reason: HOSPADM

## 2018-03-01 RX ORDER — VANCOMYCIN HYDROCHLORIDE 1 G/200ML
1000 INJECTION, SOLUTION INTRAVENOUS
Status: DISCONTINUED | OUTPATIENT
Start: 2018-03-01 | End: 2018-03-01

## 2018-03-01 RX ORDER — NICOTINE POLACRILEX 4 MG
15-30 LOZENGE BUCCAL
Status: DISCONTINUED | OUTPATIENT
Start: 2018-03-01 | End: 2018-03-24 | Stop reason: HOSPADM

## 2018-03-01 RX ORDER — ONDANSETRON 2 MG/ML
4 INJECTION INTRAMUSCULAR; INTRAVENOUS EVERY 6 HOURS PRN
Status: DISCONTINUED | OUTPATIENT
Start: 2018-03-01 | End: 2018-03-24 | Stop reason: HOSPADM

## 2018-03-01 RX ORDER — POTASSIUM CL/LIDO/0.9 % NACL 10MEQ/0.1L
10 INTRAVENOUS SOLUTION, PIGGYBACK (ML) INTRAVENOUS
Status: DISCONTINUED | OUTPATIENT
Start: 2018-03-01 | End: 2018-03-24 | Stop reason: HOSPADM

## 2018-03-01 RX ORDER — HEPARIN SODIUM 1000 [USP'U]/ML
INJECTION, SOLUTION INTRAVENOUS; SUBCUTANEOUS PRN
Status: DISCONTINUED | OUTPATIENT
Start: 2018-03-01 | End: 2018-03-01

## 2018-03-01 RX ORDER — ALBUTEROL SULFATE 90 UG/1
6 AEROSOL, METERED RESPIRATORY (INHALATION) EVERY 4 HOURS PRN
Status: DISCONTINUED | OUTPATIENT
Start: 2018-03-01 | End: 2018-03-03

## 2018-03-01 RX ORDER — VANCOMYCIN HYDROCHLORIDE 1 G/200ML
1000 INJECTION, SOLUTION INTRAVENOUS SEE ADMIN INSTRUCTIONS
Status: DISCONTINUED | OUTPATIENT
Start: 2018-03-01 | End: 2018-03-01

## 2018-03-01 RX ORDER — MYCOPHENOLATE MOFETIL 200 MG/ML
1500 POWDER, FOR SUSPENSION ORAL
Status: DISCONTINUED | OUTPATIENT
Start: 2018-03-01 | End: 2018-03-16

## 2018-03-01 RX ORDER — GLYCOPYRROLATE 0.2 MG/ML
INJECTION, SOLUTION INTRAMUSCULAR; INTRAVENOUS PRN
Status: DISCONTINUED | OUTPATIENT
Start: 2018-03-01 | End: 2018-03-01

## 2018-03-01 RX ORDER — PIPERACILLIN SODIUM, TAZOBACTAM SODIUM 3; .375 G/15ML; G/15ML
3.38 INJECTION, POWDER, LYOPHILIZED, FOR SOLUTION INTRAVENOUS EVERY 6 HOURS
Status: DISCONTINUED | OUTPATIENT
Start: 2018-03-01 | End: 2018-03-03

## 2018-03-01 RX ORDER — ONDANSETRON 4 MG/1
4 TABLET, ORALLY DISINTEGRATING ORAL EVERY 6 HOURS PRN
Status: DISCONTINUED | OUTPATIENT
Start: 2018-03-01 | End: 2018-03-24 | Stop reason: HOSPADM

## 2018-03-01 RX ORDER — SULFAMETHOXAZOLE AND TRIMETHOPRIM 200; 40 MG/5ML; MG/5ML
10 SUSPENSION ORAL DAILY
Status: DISCONTINUED | OUTPATIENT
Start: 2018-03-02 | End: 2018-03-04

## 2018-03-01 RX ORDER — METHYLPREDNISOLONE SODIUM SUCCINATE 125 MG/2ML
125 INJECTION, POWDER, LYOPHILIZED, FOR SOLUTION INTRAMUSCULAR; INTRAVENOUS EVERY 8 HOURS
Status: COMPLETED | OUTPATIENT
Start: 2018-03-02 | End: 2018-03-02

## 2018-03-01 RX ORDER — ALBUMIN, HUMAN INJ 5% 5 %
SOLUTION INTRAVENOUS
Status: COMPLETED
Start: 2018-03-01 | End: 2018-03-01

## 2018-03-01 RX ORDER — VANCOMYCIN HYDROCHLORIDE 1 G/200ML
1000 INJECTION, SOLUTION INTRAVENOUS EVERY 24 HOURS
Status: DISCONTINUED | OUTPATIENT
Start: 2018-03-02 | End: 2018-03-03

## 2018-03-01 RX ORDER — ACETAMINOPHEN 650 MG/1
650 SUPPOSITORY RECTAL ONCE
Status: COMPLETED | OUTPATIENT
Start: 2018-03-01 | End: 2018-03-01

## 2018-03-01 RX ORDER — ALBUMIN, HUMAN INJ 5% 5 %
SOLUTION INTRAVENOUS CONTINUOUS PRN
Status: DISCONTINUED | OUTPATIENT
Start: 2018-03-01 | End: 2018-03-01

## 2018-03-01 RX ORDER — NALOXONE HYDROCHLORIDE 0.4 MG/ML
.1-.4 INJECTION, SOLUTION INTRAMUSCULAR; INTRAVENOUS; SUBCUTANEOUS
Status: DISCONTINUED | OUTPATIENT
Start: 2018-03-01 | End: 2018-03-24 | Stop reason: HOSPADM

## 2018-03-01 RX ORDER — ACETAMINOPHEN 325 MG/1
975 TABLET ORAL EVERY 8 HOURS
Status: COMPLETED | OUTPATIENT
Start: 2018-03-01 | End: 2018-03-04

## 2018-03-01 RX ORDER — ALBUMIN, HUMAN INJ 5% 5 %
SOLUTION INTRAVENOUS
Status: COMPLETED
Start: 2018-03-01 | End: 2018-03-02

## 2018-03-01 RX ORDER — DEXMEDETOMIDINE HYDROCHLORIDE 4 UG/ML
0.2-1.2 INJECTION, SOLUTION INTRAVENOUS CONTINUOUS
Status: DISCONTINUED | OUTPATIENT
Start: 2018-03-01 | End: 2018-03-01 | Stop reason: HOSPADM

## 2018-03-01 RX ORDER — SULFAMETHOXAZOLE AND TRIMETHOPRIM 400; 80 MG/1; MG/1
1 TABLET ORAL DAILY
Status: DISCONTINUED | OUTPATIENT
Start: 2018-03-02 | End: 2018-03-04

## 2018-03-01 RX ORDER — POTASSIUM CHLORIDE 1.5 G/1.58G
20-40 POWDER, FOR SOLUTION ORAL
Status: DISCONTINUED | OUTPATIENT
Start: 2018-03-01 | End: 2018-03-24 | Stop reason: HOSPADM

## 2018-03-01 RX ORDER — POTASSIUM CHLORIDE 29.8 MG/ML
20 INJECTION INTRAVENOUS
Status: DISCONTINUED | OUTPATIENT
Start: 2018-03-01 | End: 2018-03-24 | Stop reason: HOSPADM

## 2018-03-01 RX ORDER — PROPOFOL 10 MG/ML
10-20 INJECTION, EMULSION INTRAVENOUS EVERY 30 MIN PRN
Status: DISCONTINUED | OUTPATIENT
Start: 2018-03-01 | End: 2018-03-07

## 2018-03-01 RX ORDER — PROPOFOL 10 MG/ML
5-75 INJECTION, EMULSION INTRAVENOUS CONTINUOUS
Status: DISCONTINUED | OUTPATIENT
Start: 2018-03-01 | End: 2018-03-07

## 2018-03-01 RX ORDER — POTASSIUM CHLORIDE 7.45 MG/ML
10 INJECTION INTRAVENOUS
Status: DISCONTINUED | OUTPATIENT
Start: 2018-03-01 | End: 2018-03-24 | Stop reason: HOSPADM

## 2018-03-01 RX ORDER — FENTANYL CITRATE 50 UG/ML
50-100 INJECTION, SOLUTION INTRAMUSCULAR; INTRAVENOUS
Status: DISCONTINUED | OUTPATIENT
Start: 2018-03-01 | End: 2018-03-01

## 2018-03-01 RX ORDER — ACETAMINOPHEN 325 MG/1
650 TABLET ORAL EVERY 4 HOURS PRN
Status: DISCONTINUED | OUTPATIENT
Start: 2018-03-04 | End: 2018-03-24 | Stop reason: HOSPADM

## 2018-03-01 RX ORDER — NYSTATIN 100000/ML
1000000 SUSPENSION, ORAL (FINAL DOSE FORM) ORAL 4 TIMES DAILY
Status: DISCONTINUED | OUTPATIENT
Start: 2018-03-01 | End: 2018-03-24 | Stop reason: HOSPADM

## 2018-03-01 RX ORDER — DEXTROSE MONOHYDRATE 25 G/50ML
25-50 INJECTION, SOLUTION INTRAVENOUS
Status: DISCONTINUED | OUTPATIENT
Start: 2018-03-01 | End: 2018-03-24 | Stop reason: HOSPADM

## 2018-03-01 RX ORDER — PREDNISOLONE SODIUM PHOSPHATE 15 MG/5ML
0.25 SOLUTION ORAL 2 TIMES DAILY
Status: DISCONTINUED | OUTPATIENT
Start: 2018-03-03 | End: 2018-03-15

## 2018-03-01 RX ORDER — NALOXONE HYDROCHLORIDE 0.4 MG/ML
.1-.4 INJECTION, SOLUTION INTRAMUSCULAR; INTRAVENOUS; SUBCUTANEOUS
Status: DISCONTINUED | OUTPATIENT
Start: 2018-03-01 | End: 2018-03-01

## 2018-03-01 RX ORDER — LIDOCAINE 40 MG/G
CREAM TOPICAL
Status: DISCONTINUED | OUTPATIENT
Start: 2018-03-01 | End: 2018-03-01 | Stop reason: HOSPADM

## 2018-03-01 RX ORDER — POTASSIUM CHLORIDE 750 MG/1
20-40 TABLET, EXTENDED RELEASE ORAL
Status: DISCONTINUED | OUTPATIENT
Start: 2018-03-01 | End: 2018-03-24 | Stop reason: HOSPADM

## 2018-03-01 RX ORDER — MYCOPHENOLATE MOFETIL 200 MG/ML
1500 POWDER, FOR SUSPENSION ORAL ONCE
Status: DISCONTINUED | OUTPATIENT
Start: 2018-03-01 | End: 2018-03-01

## 2018-03-01 RX ORDER — PIPERACILLIN SODIUM, TAZOBACTAM SODIUM 2; .25 G/10ML; G/10ML
2.25 INJECTION, POWDER, LYOPHILIZED, FOR SOLUTION INTRAVENOUS SEE ADMIN INSTRUCTIONS
Status: DISCONTINUED | OUTPATIENT
Start: 2018-03-01 | End: 2018-03-01

## 2018-03-01 RX ORDER — CALCIUM CHLORIDE 100 MG/ML
INJECTION INTRAVENOUS; INTRAVENTRICULAR PRN
Status: DISCONTINUED | OUTPATIENT
Start: 2018-03-01 | End: 2018-03-01

## 2018-03-01 RX ORDER — HEPARIN SODIUM (PORCINE) LOCK FLUSH IV SOLN 100 UNIT/ML 100 UNIT/ML
5-10 SOLUTION INTRAVENOUS EVERY 30 MIN PRN
Status: DISCONTINUED | OUTPATIENT
Start: 2018-03-01 | End: 2018-03-08

## 2018-03-01 RX ORDER — SODIUM CHLORIDE 9 MG/ML
INJECTION, SOLUTION INTRAVENOUS CONTINUOUS PRN
Status: DISCONTINUED | OUTPATIENT
Start: 2018-03-01 | End: 2018-03-01

## 2018-03-01 RX ORDER — MAGNESIUM SULFATE HEPTAHYDRATE 40 MG/ML
4 INJECTION, SOLUTION INTRAVENOUS EVERY 4 HOURS PRN
Status: DISCONTINUED | OUTPATIENT
Start: 2018-03-01 | End: 2018-03-24 | Stop reason: HOSPADM

## 2018-03-01 RX ORDER — SODIUM CHLORIDE, SODIUM LACTATE, POTASSIUM CHLORIDE, CALCIUM CHLORIDE 600; 310; 30; 20 MG/100ML; MG/100ML; MG/100ML; MG/100ML
INJECTION, SOLUTION INTRAVENOUS CONTINUOUS PRN
Status: DISCONTINUED | OUTPATIENT
Start: 2018-03-01 | End: 2018-03-01

## 2018-03-01 RX ORDER — OXYCODONE HYDROCHLORIDE 5 MG/1
5-10 TABLET ORAL EVERY 4 HOURS PRN
Status: DISCONTINUED | OUTPATIENT
Start: 2018-03-01 | End: 2018-03-20

## 2018-03-01 RX ADMIN — MIDAZOLAM 2 MG: 1 INJECTION INTRAMUSCULAR; INTRAVENOUS at 17:33

## 2018-03-01 RX ADMIN — NYSTATIN 500000 UNITS: 100000 SUSPENSION ORAL at 08:06

## 2018-03-01 RX ADMIN — ROCURONIUM BROMIDE 50 MG: 10 INJECTION INTRAVENOUS at 16:38

## 2018-03-01 RX ADMIN — MINERAL OIL AND WHITE PETROLATUM: 150; 830 OINTMENT OPHTHALMIC at 22:15

## 2018-03-01 RX ADMIN — SODIUM CHLORIDE: 9 INJECTION, SOLUTION INTRAVENOUS at 18:29

## 2018-03-01 RX ADMIN — TACROLIMUS 4 MG: 5 CAPSULE ORAL at 08:06

## 2018-03-01 RX ADMIN — PIPERACILLIN AND TAZOBACTAM 3.38 G: 3; .375 INJECTION, POWDER, LYOPHILIZED, FOR SOLUTION INTRAVENOUS; PARENTERAL at 10:47

## 2018-03-01 RX ADMIN — POTASSIUM CHLORIDE 20 MEQ: 29.8 INJECTION, SOLUTION INTRAVENOUS at 22:12

## 2018-03-01 RX ADMIN — PROTAMINE SULFATE 25 MG: 10 INJECTION, SOLUTION INTRAVENOUS at 19:20

## 2018-03-01 RX ADMIN — Medication 100 MCG/HR: at 05:17

## 2018-03-01 RX ADMIN — FENTANYL CITRATE 100 MCG: 50 INJECTION, SOLUTION INTRAMUSCULAR; INTRAVENOUS at 15:35

## 2018-03-01 RX ADMIN — MYCOPHENOLATE MOFETIL 1500 MG: 200 POWDER, FOR SUSPENSION ORAL at 22:14

## 2018-03-01 RX ADMIN — FENTANYL CITRATE 150 MCG: 50 INJECTION, SOLUTION INTRAMUSCULAR; INTRAVENOUS at 17:36

## 2018-03-01 RX ADMIN — ROCURONIUM BROMIDE 20 MG: 10 INJECTION INTRAVENOUS at 19:18

## 2018-03-01 RX ADMIN — FENTANYL CITRATE 100 MCG: 50 INJECTION, SOLUTION INTRAMUSCULAR; INTRAVENOUS at 19:46

## 2018-03-01 RX ADMIN — HYDROCORTISONE SODIUM SUCCINATE 50 MG: 100 INJECTION, POWDER, FOR SOLUTION INTRAMUSCULAR; INTRAVENOUS at 04:12

## 2018-03-01 RX ADMIN — MIDAZOLAM 1 MG: 1 INJECTION INTRAMUSCULAR; INTRAVENOUS at 18:25

## 2018-03-01 RX ADMIN — SODIUM CHLORIDE: 9 INJECTION, SOLUTION INTRAVENOUS at 14:21

## 2018-03-01 RX ADMIN — MYCOPHENOLATE MOFETIL 1500 MG: 500 INJECTION, POWDER, LYOPHILIZED, FOR SOLUTION INTRAVENOUS at 18:35

## 2018-03-01 RX ADMIN — MINERAL OIL AND WHITE PETROLATUM: 150; 830 OINTMENT OPHTHALMIC at 04:12

## 2018-03-01 RX ADMIN — HYDROCORTISONE SODIUM SUCCINATE 50 MG: 100 INJECTION, POWDER, FOR SOLUTION INTRAMUSCULAR; INTRAVENOUS at 09:32

## 2018-03-01 RX ADMIN — MIDAZOLAM 2 MG: 1 INJECTION INTRAMUSCULAR; INTRAVENOUS at 14:15

## 2018-03-01 RX ADMIN — FENTANYL CITRATE 500 MCG: 50 INJECTION, SOLUTION INTRAMUSCULAR; INTRAVENOUS at 14:32

## 2018-03-01 RX ADMIN — PHENYLEPHRINE HYDROCHLORIDE: 10 INJECTION, SOLUTION INTRAMUSCULAR; INTRAVENOUS; SUBCUTANEOUS at 14:39

## 2018-03-01 RX ADMIN — PROPOFOL 25 MCG/KG/MIN: 10 INJECTION, EMULSION INTRAVENOUS at 21:44

## 2018-03-01 RX ADMIN — HEPARIN SODIUM 1800 UNITS/HR: 10000 INJECTION, SOLUTION INTRAVENOUS at 14:21

## 2018-03-01 RX ADMIN — FENTANYL CITRATE 50 MCG: 50 INJECTION, SOLUTION INTRAMUSCULAR; INTRAVENOUS at 14:47

## 2018-03-01 RX ADMIN — ALBUMIN HUMAN 12.5 G: 0.05 INJECTION, SOLUTION INTRAVENOUS at 00:52

## 2018-03-01 RX ADMIN — Medication 75 MG: at 08:05

## 2018-03-01 RX ADMIN — EPINEPHRINE 0.03 MCG/KG/MIN: 1 INJECTION PARENTERAL at 18:49

## 2018-03-01 RX ADMIN — SODIUM CHLORIDE 1 G: 9 INJECTION, SOLUTION INTRAVENOUS at 14:35

## 2018-03-01 RX ADMIN — FENTANYL CITRATE 100 MCG: 50 INJECTION, SOLUTION INTRAMUSCULAR; INTRAVENOUS at 18:35

## 2018-03-01 RX ADMIN — ALBUMIN HUMAN: 0.05 INJECTION, SOLUTION INTRAVENOUS at 21:00

## 2018-03-01 RX ADMIN — CALCIUM CHLORIDE 500 MG: 100 INJECTION, SOLUTION INTRAVENOUS at 17:34

## 2018-03-01 RX ADMIN — CALCIUM CHLORIDE 500 MG: 100 INJECTION, SOLUTION INTRAVENOUS at 18:50

## 2018-03-01 RX ADMIN — SODIUM CHLORIDE: 9 INJECTION, SOLUTION INTRAVENOUS at 14:30

## 2018-03-01 RX ADMIN — HEPARIN SODIUM 34.35 UNITS/KG/HR: 10000 INJECTION, SOLUTION INTRAVENOUS at 12:15

## 2018-03-01 RX ADMIN — MINERAL OIL AND WHITE PETROLATUM: 150; 830 OINTMENT OPHTHALMIC at 09:23

## 2018-03-01 RX ADMIN — VASOPRESSIN 1 UNITS: 20 INJECTION, SOLUTION INTRAMUSCULAR; SUBCUTANEOUS at 18:29

## 2018-03-01 RX ADMIN — SODIUM BICARBONATE 50 MEQ: 84 INJECTION, SOLUTION INTRAVENOUS at 19:51

## 2018-03-01 RX ADMIN — ACETAMINOPHEN 650 MG: 650 SUPPOSITORY RECTAL at 05:04

## 2018-03-01 RX ADMIN — ROCURONIUM BROMIDE 50 MG: 10 INJECTION INTRAVENOUS at 18:09

## 2018-03-01 RX ADMIN — SODIUM CHLORIDE: 9 INJECTION, SOLUTION INTRAVENOUS at 14:45

## 2018-03-01 RX ADMIN — HEPARIN SODIUM 10000 UNITS: 1000 INJECTION, SOLUTION INTRAVENOUS; SUBCUTANEOUS at 16:22

## 2018-03-01 RX ADMIN — VASOPRESSIN 2 UNITS: 20 INJECTION, SOLUTION INTRAMUSCULAR; SUBCUTANEOUS at 18:05

## 2018-03-01 RX ADMIN — ROCURONIUM BROMIDE 50 MG: 10 INJECTION INTRAVENOUS at 14:29

## 2018-03-01 RX ADMIN — MIDAZOLAM 2 MG: 1 INJECTION INTRAMUSCULAR; INTRAVENOUS at 14:24

## 2018-03-01 RX ADMIN — PIPERACILLIN AND TAZOBACTAM 3.38 G: 3; .375 INJECTION, POWDER, LYOPHILIZED, FOR SOLUTION INTRAVENOUS; PARENTERAL at 14:41

## 2018-03-01 RX ADMIN — ACETAMINOPHEN 975 MG: 325 TABLET, FILM COATED ORAL at 22:16

## 2018-03-01 RX ADMIN — ALBUMIN HUMAN 12.5 G: 0.05 INJECTION, SOLUTION INTRAVENOUS at 22:57

## 2018-03-01 RX ADMIN — FAMOTIDINE 20 MG: 40 POWDER, FOR SUSPENSION ORAL at 08:06

## 2018-03-01 RX ADMIN — ROCURONIUM BROMIDE 50 MG: 10 INJECTION INTRAVENOUS at 14:47

## 2018-03-01 RX ADMIN — VASOPRESSIN 1 UNITS: 20 INJECTION, SOLUTION INTRAMUSCULAR; SUBCUTANEOUS at 17:41

## 2018-03-01 RX ADMIN — VASOPRESSIN 1 UNITS: 20 INJECTION, SOLUTION INTRAMUSCULAR; SUBCUTANEOUS at 17:55

## 2018-03-01 RX ADMIN — NOREPINEPHRINE BITARTRATE 0.03 MCG/KG/MIN: 1 INJECTION INTRAVENOUS at 15:54

## 2018-03-01 RX ADMIN — EPOPROSTENOL 20 NG/KG/MIN: 1.5 INJECTION, POWDER, LYOPHILIZED, FOR SOLUTION INTRAVENOUS at 09:51

## 2018-03-01 RX ADMIN — MULTIVITAMIN 15 ML: LIQUID ORAL at 08:06

## 2018-03-01 RX ADMIN — DEXMEDETOMIDINE HYDROCHLORIDE 0.4 MCG/KG/HR: 4 INJECTION, SOLUTION INTRAVENOUS at 19:27

## 2018-03-01 RX ADMIN — FENTANYL CITRATE 100 MCG: 50 INJECTION, SOLUTION INTRAMUSCULAR; INTRAVENOUS at 15:26

## 2018-03-01 RX ADMIN — GLYCOPYRROLATE 0.2 MG: 0.2 INJECTION, SOLUTION INTRAMUSCULAR; INTRAVENOUS at 14:41

## 2018-03-01 RX ADMIN — NOREPINEPHRINE BITARTRATE 6.4 MCG: 1 INJECTION INTRAVENOUS at 16:33

## 2018-03-01 RX ADMIN — SODIUM CHLORIDE: 9 INJECTION, SOLUTION INTRAVENOUS at 14:35

## 2018-03-01 RX ADMIN — MIDAZOLAM 1 MG: 1 INJECTION INTRAMUSCULAR; INTRAVENOUS at 15:26

## 2018-03-01 RX ADMIN — MIDAZOLAM 1 MG: 1 INJECTION INTRAMUSCULAR; INTRAVENOUS at 18:09

## 2018-03-01 RX ADMIN — VANCOMYCIN HYDROCHLORIDE 1 G: 1 INJECTION, SOLUTION INTRAVENOUS at 14:41

## 2018-03-01 RX ADMIN — ROCURONIUM BROMIDE 50 MG: 10 INJECTION INTRAVENOUS at 15:29

## 2018-03-01 RX ADMIN — HEPARIN SODIUM 5000 UNITS: 1000 INJECTION, SOLUTION INTRAVENOUS; SUBCUTANEOUS at 19:52

## 2018-03-01 RX ADMIN — NYSTATIN 1000000 UNITS: 500000 SUSPENSION ORAL at 22:15

## 2018-03-01 RX ADMIN — PHENYLEPHRINE HYDROCHLORIDE 200 MCG: 10 INJECTION, SOLUTION INTRAMUSCULAR; INTRAVENOUS; SUBCUTANEOUS at 14:43

## 2018-03-01 RX ADMIN — PHENYLEPHRINE HYDROCHLORIDE 200 MCG: 10 INJECTION, SOLUTION INTRAMUSCULAR; INTRAVENOUS; SUBCUTANEOUS at 14:40

## 2018-03-01 RX ADMIN — ALBUMIN HUMAN: 0.05 INJECTION, SOLUTION INTRAVENOUS at 17:43

## 2018-03-01 RX ADMIN — Medication 3 MG/HR: at 06:30

## 2018-03-01 RX ADMIN — NOREPINEPHRINE BITARTRATE 6.4 MCG: 1 INJECTION INTRAVENOUS at 15:50

## 2018-03-01 RX ADMIN — VASOPRESSIN 2 UNITS: 20 INJECTION, SOLUTION INTRAMUSCULAR; SUBCUTANEOUS at 17:26

## 2018-03-01 RX ADMIN — PIPERACILLIN AND TAZOBACTAM 3.38 G: 3; .375 INJECTION, POWDER, LYOPHILIZED, FOR SOLUTION INTRAVENOUS; PARENTERAL at 22:16

## 2018-03-01 RX ADMIN — NOREPINEPHRINE BITARTRATE 6.4 MCG: 1 INJECTION INTRAVENOUS at 16:41

## 2018-03-01 RX ADMIN — EPOPROSTENOL: 1.5 INJECTION, POWDER, LYOPHILIZED, FOR SOLUTION INTRAVENOUS at 18:55

## 2018-03-01 RX ADMIN — FENTANYL CITRATE 100 MCG: 50 INJECTION, SOLUTION INTRAMUSCULAR; INTRAVENOUS at 16:52

## 2018-03-01 RX ADMIN — PIPERACILLIN AND TAZOBACTAM 3.38 G: 3; .375 INJECTION, POWDER, LYOPHILIZED, FOR SOLUTION INTRAVENOUS; PARENTERAL at 05:05

## 2018-03-01 RX ADMIN — HUMAN INSULIN 4 UNITS/HR: 100 INJECTION, SOLUTION SUBCUTANEOUS at 21:00

## 2018-03-01 RX ADMIN — TACROLIMUS 1 MCG/KG/HR: 5 INJECTION, SOLUTION INTRAVENOUS at 21:10

## 2018-03-01 RX ADMIN — Medication 5 G/HR: at 14:30

## 2018-03-01 RX ADMIN — MIDAZOLAM 2 MG: 1 INJECTION INTRAMUSCULAR; INTRAVENOUS at 19:30

## 2018-03-01 RX ADMIN — NYSTATIN 500000 UNITS: 100000 SUSPENSION ORAL at 11:52

## 2018-03-01 RX ADMIN — MIDAZOLAM 2 MG: 1 INJECTION INTRAMUSCULAR; INTRAVENOUS at 16:50

## 2018-03-01 RX ADMIN — PIPERACILLIN AND TAZOBACTAM 3.38 G: 3; .375 INJECTION, POWDER, LYOPHILIZED, FOR SOLUTION INTRAVENOUS; PARENTERAL at 00:32

## 2018-03-01 RX ADMIN — EPOPROSTENOL 20 NG/KG/MIN: 1.5 INJECTION, POWDER, LYOPHILIZED, FOR SOLUTION INTRAVENOUS at 03:19

## 2018-03-01 NOTE — ANESTHESIA PROCEDURE NOTES
Central Line Procedure Note  Staff:     Anesthesiologist:  TORSTEN GILLESPIE  Location: In OR after induction  Procedure Start/Stop Times:     patient identified, IV checked, site marked, risks and benefits discussed, informed consent, monitors and equipment checked, pre-op evaluation and at physician/surgeon's request      Correct Patient: Yes      Correct Position: Yes      Correct Site: Yes      Correct Procedure: Yes      Correct Laterality:  Yes    Site Marked:  Yes  Line Placement:     Procedure:  Central Line    Insertion laterality:  Left    Insertion site:  Internal Jugular    Position:  Trendelenburg      Maximal Sterile Barriers: All elements of maximal sterile barrier technique followed      (Maximal sterile barriers include:   Sterile gown, Sterile Gloves, Mask, Cap, Whole body draped, hand hygiene and acceptable skin prep).Skin Prep: Chloraprep         Injection Technique:  Ultrasound guided    Sterile Ultrasound Technique:  Sterile probe cover and Sterile gel    Vein evaluated via U/S for patency/adequacy of catheter insertion and is adequate.  Using realtime U/S imaging the vein was punctured, and needle was observed entering vein on U/S      A permanent image is NOT entered into the patient's record.      Local skin infiltration:  None    Catheter size:  9 Fr, 2 lumen 11.5 cm (MAC)    Catheter length at skin (cm):  15    Cath secured with: suture      Dressing:  Tegaderm    Complications:  None obvious    Blood aspirated all lumens: Yes      All Lumens Flushed: Yes      Tip termination: subclavian      Verification method:  Placement to be verified post-op

## 2018-03-01 NOTE — PROGRESS NOTES
CVICU PROGRESS NOTE  March 1, 2018      CO-MORBIDITIES:   Hypotension, unspecified hypotension type  (primary encounter diagnosis)  Organ transplant candidate  Lung disease, interstitial (H)  Abnormal chest CT  Acute on chronic respiratory failure with hypoxia (H)  ILD (interstitial lung disease) (H)    ASSESSMENT: Kecia Blue is a 57 year old female with PMH significant for dermatomyositis on immunosuppression, eronegative RA, ILD, and pulmonary hypertension, who was admitted for emergent lung transplant work-up on 2/21 for increasing shortness of breath and hypoxia now POD # 2 s/p emergent VA ECMO placement on 2/27/18. Now awaiting lung transplantation this evening.    TODAY'S PROGRESS/PLAN  - To the OR today for lung transplant  - Transplant order set for prophylaxis  - Keep ECMO ACT goal the same    PLAN:  Neuro/ pain/ sedation:  - Monitor neurological status. Notify the MD for any acute changes in exam.  - Fentanyl gtt for pain.  - Midazolam gtt for sedation.    Pulmonary care:  # Acute on chronic respiratory failure, worsening  # ILD related to dermatomyositis, on transplant list  # Severe pulmonary hypertension (WHO class III)  # Pneumomediastinum, 2/22/2018   - Supplemental oxygen via VA ECMO and ventilator to keep saturation above 92 %.  - Incentive spirometer every 15- 30 minutes when awake.  - Flolan 20mg/kg/min  - Nitric oxide weaned off yesterday  - VA ECMO, q4h ABG    Cardiovascular:  # Right heart failure  # Pulmonary HTN    - Monitor hemodynamic status.   - Most likely 2/2 pulmonary hypertension from ILD, but did not rule out PE - unlikely given hypotension occurred after intubation and did not occur at the same time as her worsening hypoxia.    GI care:   - NPO except ice chips and medications.  - Place NJ today, trophic feeds as able    Fluids/ Electrolytes/ Nutrition:   - Periodic fluid bolus for IV fluid hydration  - Parenteral nutrition to start with placement of NG/NJ tube.    Renal/  Fluid Balance:  #RADHA-resolved    - Urine output is adequate so far.  - Will continue to monitor intake and output.    Endocrine:    - Hydrocortisone per immunosuppressant needs.   - Insulin gtt     ID/ Antibiotics:  - Anti-rejection and immunosuppressant medications per transplant team including azathioprine, nystatin, Zosyn, Bactrim, Tacro, Vanco.    Heme:     - Hemoglobin stable.   - q4h Hgb monitoring with ECMO protocol    Prophylaxis:    - Mechanical prophylaxis for DVT.   - Heparin chemical DVT prophylaxis due to high risk of bleeding for ECMO circuit  - Famotidine BID    Lines/ tubes/ drains:  - VA-ECMO right subclavian, right femoral to move to RIJ  - Move RIJ to LIJ, move arterial line from left femoral to left radial/axillary artery    Disposition:  - CVICU.     Patient seen, findings and plan discussed with surgical ICU staff Dr. Aguilar.    Kwesi Schwartz MD  Anesthesiology Resident CA2, PGY3      ====================================    TODAY'S PROGRESS:   SUBJECTIVE:   - planning for lung transplant  - Hgb dropping slowly, no concern for active bleeding    OBJECTIVE:   1. VITAL SIGNS:   Temp:  [97.2  F (36.2  C)-98.1  F (36.7  C)] 97.7  F (36.5  C)  Heart Rate:  [53-88] 57  Resp:  [28] 28  MAP:  [60 mmHg-81 mmHg] 70 mmHg  Arterial Line BP: ()/(28-69) 86/60  FiO2 (%):  [40 %] 40 %  SpO2:  [96 %-100 %] 97 %  Ventilation Mode: CMV/AC  (Continuous Mandatory Ventilation/ Assist Control)  FiO2 (%): 40 %  Rate Set (breaths/minute): 28 breaths/min  Tidal Volume Set (mL): 350 mL  PEEP (cm H2O): 12 cmH2O  Oxygen Concentration (%): 40 %  Resp: 28    2. INTAKE/ OUTPUT:   I/O last 3 completed shifts:  In: 2459.3 [I.V.:1939.3; NG/GT:120]  Out: 1540 [Urine:740; Emesis/NG output:800]    3. PHYSICAL EXAMINATION:   General: Sedated, NAD  Neuro: A&Ox3, NAD  Resp: Intubated, diminished lung sounds bilaterally  CV: RRR, No noted m/r/g  Abdomen: Soft, Non-distended  Incisions: c/d/i  Extremities: warm and well  perfused    4. INVESTIGATIONS:   Arterial Blood Gases     Recent Labs  Lab 03/01/18  0627 03/01/18 0356 02/28/18  2351 02/28/18 2202   PH 7.44 7.46* 7.48* 7.49*   PCO2 39 37 35 35   PO2 64* 125* 131* 102   HCO3 26 26 27 26     Complete Blood Count     Recent Labs  Lab 03/01/18 0356 02/28/18 2202 02/28/18  1639 02/28/18  0953   WBC 10.3 12.1* 15.0* 15.2*   HGB 6.8* 7.5* 8.0* 8.7*    180 205 202     Basic Metabolic Panel    Recent Labs  Lab 03/01/18 0356 02/28/18 2202 02/28/18  1639 02/28/18  0953    141 141 141   POTASSIUM 4.3 4.0 4.2 3.8   CHLORIDE 110* 108 107 106   CO2 25 24 25 24   BUN 37* 36* 36* 35*   CR 1.25* 1.26* 1.28* 1.23*   * 157* 122* 144*     Liver Function Tests    Recent Labs  Lab 03/01/18 0356 02/28/18 2202 02/28/18 1639 02/28/18  0953 02/28/18  0357   AST 58*  --   --   --   --    *  --   --   --  361*   ALKPHOS 37*  --   --   --   --    BILITOTAL 0.7  --   --   --   --    ALBUMIN 2.4*  --   --   --  2.4*   INR 1.51* 1.58* 1.52* 1.58* 1.52*     Pancreatic Enzymes  No lab results found in last 7 days.  Coagulation Profile    Recent Labs  Lab 03/01/18 0356 02/28/18 2202 02/28/18 1639 02/28/18  0953   INR 1.51* 1.58* 1.52* 1.58*   * 98* 81* 52*     Lactate  Invalid input(s): LACTATE    5. RADIOLOGY:   Recent Results (from the past 24 hour(s))   XR Abdomen Port 1 View    Narrative    EXAMINATION: XR ABDOMEN PORT 1 VW, 2/28/2018 9:37 AM    COMPARISON: February 27, 2018    HISTORY: OG tube verification;     FINDINGS: OG tube sidehole overlies the stomach with tip at the  gastric fundus. No air-filled dilated loops of small or large bowel.  Fibrotic changes in the lung bases. Large bore right femoral venous  catheter remains in place. Left sided femoral line tip at the level of  the pelvis.      Impression    IMPRESSION: OG tube tip overlies the stomach.     JAMES LAI MD   XR Abdomen Port 1 View    Narrative    EXAMINATION: XR ABDOMEN PORT 1 VW,  2/28/2018 2:32 PM    COMPARISON: February 27, 2018    HISTORY: Verify small bowel feeding tube bedside placement;      FINDINGS:   Supine radiograph of the abdomen is obtained and reviewed. Portion of  the upper abdomen collimated out of view. Feeding tube tip projects  over the distal stomach. IVC ECMO cannula is similar in appearance  compared to chest x-ray same day. Left femoral catheter is unchanged.  OG tube sidehole overlies the stomach with tip not visualized.  Nondilated, air-filled loops of small or large bowel. Fibrotic changes  in the right lung base. Large bore right femoral venous catheter  remains in place. Left sided femoral line tip at the level of the  pelvis. Phleboliths and vascular calcifications noted in the pelvis.      Impression    IMPRESSION: Feeding tube tip overlies the distal stomach near the  gastroesophageal junction.     I have personally reviewed the examination and initial interpretation  and I agree with the findings.    MEHNAZ ELAINE   XR Chest Port 1 View    Narrative    Exam: XR CHEST PORT 1 VW, 2/28/2018 6:59 PM    Indication: new left IJ placement;     Comparison: Earlier same day    Findings:   New left IJ central venous catheter tip projects over the mid SVC  directed inferiorly. Right IJ central venous catheter tip is stable  projecting over the mid SVC. Anterior projecting Parsons projects over  the right atrium. OG tube tip and sidehole project over the stomach.  Feeding tube courses below the diaphragm, tip outside the  field-of-view. Endotracheal tube tip projects 5.4 cm above the gee.    Stable, normal cardiac mediastinal silhouette. Stable low lung  volumes. Pulmonary vasculature is distinct. Allowing for differences  in technique, no significant overall change in diffuse interstitial  and patchy basilar airspace opacities in the setting of pulmonary  fibrosis. No significant pleural effusion. No pneumothorax.      Impression    Impression:   1. New left IJ central  venous catheter tip projects over the mid SVC.  New feeding tube courses below the diaphragm, tip outside the  field-of-view. Otherwise stable support devices.  2. Stable bilateral opacities in the setting of pulmonary fibrosis.    DON MD HUNTER (Brandon)       =========================================

## 2018-03-01 NOTE — PLAN OF CARE
Problem: Patient Care Overview  Goal: Plan of Care/Patient Progress Review  OT:  Pt scheduled for lung transplant today.  Will reschedule evaluation for Saturday per nursing.

## 2018-03-01 NOTE — PROGRESS NOTES
Pt. To OR at approx. 1400 for bilateral lung transplant. Pt. Received Prograf and Imuran prior to departure. Solumedrol 1gm IV x1 sent with pt. To OR with other induction medications (Zosyn 4.5gms, Vanco 1gm and Levophed). Family present in 3rd floor surgery lounge. Will await return of pt. From surgery.

## 2018-03-01 NOTE — PLAN OF CARE
Problem: Extracorporeal Life Support/Membrane Oxygenation (Adult)  Goal: Signs and Symptoms of Listed Potential Problems Will be Absent, Minimized or Managed (Extracorporeal Life Support/Membrane Oxygenation)  Signs and symptoms of listed potential problems will be absent, minimized or managed by discharge/transition of care (reference Extracorporeal Life Support/Membrane Oxygenation (Adult) CPG).   Outcome: No Change  D: Pt. S/p V-A ECMO cannulation on 2/27/18 for respiratory failure s/t pulmonary HTN. Pt. To OR this afternoon for bilateral lung transplant.  I: Heparin continues. Preop scrubs and orders complete. Family present at bedside. TF stopped at 0300.  P: OR at 1300 for lung transplant. Will await return of pt. From OR

## 2018-03-01 NOTE — PROGRESS NOTES
CVTS PROGRESS NOTE  March 1, 2018      CO-MORBIDITIES:   Hypotension, unspecified hypotension type  (primary encounter diagnosis)  Organ transplant candidate  Lung disease, interstitial (H)  Abnormal chest CT  Acute on chronic respiratory failure with hypoxia (H)  ILD (interstitial lung disease) (H)    ASSESSMENT: Kecia Blue is a 57 year old female with PMH significant for dermatomyositis on immunosuppression, eronegative RA, ILD, and pulmonary hypertension, who was admitted for emergent lung transplant work-up on 2/21 for increasing shortness of breath and hypoxia now POD # 2 s/p emergent [facility] ECMO placement on 2/27/18. Now awaiting lung transplantation this evening.    TODAY'S PROGRESS/PLAN  - To the OR today for lung transplant  - Transplant order set for prophylaxis  - Keep ECMO ACT goal the same    PLAN:  Neuro/ pain/ sedation:  - Monitor neurological status. Notify the MD for any acute changes in exam.  - Fentanyl gtt for pain.  - Midazolam gtt for sedation.    Pulmonary care:  # Acute on chronic respiratory failure, worsening  # ILD related to dermatomyositis, on transplant list  # Severe pulmonary hypertension (WHO class III)  # Pneumomediastinum, 2/22/2018   - Supplemental oxygen via VA ECMO and ventilator to keep saturation above 92 %.  - Incentive spirometer every 15- 30 minutes when awake.  - Flolan 20mg/kg/min  - Nitric oxide weaned off yesterday  - VA ECMO, q4h ABG    Cardiovascular:  # Right heart failure  # Pulmonary HTN    - Monitor hemodynamic status.   - Most likely 2/2 pulmonary hypertension from ILD, but did not rule out PE - unlikely given hypotension occurred after intubation and did not occur at the same time as her worsening hypoxia.    GI care:   - NPO except ice chips and medications.  - Place NJ today, trophic feeds as able    Fluids/ Electrolytes/ Nutrition:   - Periodic fluid bolus for IV fluid hydration  - Parenteral nutrition to start with placement of NG/NJ tube.    Renal/  Fluid Balance:  #RADHA-resolved    - Urine output is adequate so far.  - Will continue to monitor intake and output.    Endocrine:    - Hydrocortisone per immunosuppressant needs.   - Insulin gtt     ID/ Antibiotics:  - Anti-rejection and immunosuppressant medications per transplant team including azathioprine, nystatin, Zosyn, Bactrim, [place], Vanco.    Heme:     - Hemoglobin stable.   - q4h Hgb monitoring with ECMO protocol    Prophylaxis:    - Mechanical prophylaxis for DVT.   - Heparin chemical DVT prophylaxis due to high risk of bleeding for ECMO circuit  - Famotidine BID    Lines/ tubes/ drains:  - VA-ECMO right subclavian, right femoral to move to RIJ  - Move RIJ to LIJ, move arterial line from left femoral to left radial/axillary artery    Disposition:  - CVICU.       Kwesi Schwartz MD  Anesthesiology Resident CA2, PGY3      ====================================    TODAY'S PROGRESS:   SUBJECTIVE:   - planning for lung transplant  - Hgb dropping slowly, no concern for active bleeding    OBJECTIVE:   1. VITAL SIGNS:   Temp:  [97.2  F (36.2  C)-98.1  F (36.7  C)] 97.7  F (36.5  C)  Heart Rate:  [53-77] 57  Resp:  [28] 28  MAP:  [60 mmHg-77 mmHg] 70 mmHg  Arterial Line BP: (75-99)/(28-67) 86/60  FiO2 (%):  [40 %] 40 %  SpO2:  [96 %-100 %] 100 %  Ventilation Mode: CMV/AC  (Continuous Mandatory Ventilation/ Assist Control)  FiO2 (%): 40 %  Rate Set (breaths/minute): 28 breaths/min  Tidal Volume Set (mL): 350 mL  PEEP (cm H2O): 12 cmH2O  Oxygen Concentration (%): 40 %  Resp: 28    2. INTAKE/ OUTPUT:   I/O last 3 completed shifts:  In: 2459.3 [I.V.:1939.3; NG/GT:120]  Out: 1540 [Urine:740; Emesis/NG output:800]    3. PHYSICAL EXAMINATION:   General: Sedated, NAD  Neuro: A&Ox3, NAD  Resp: Intubated, diminished lung sounds bilaterally  CV: RRR, No noted m/r/g  Abdomen: Soft, Non-distended  Incisions: c/d/i  Extremities: warm and well perfused    4. INVESTIGATIONS:   Arterial Blood Gases     Recent Labs  Lab  03/01/18 0926 03/01/18  0735 03/01/18 0627 03/01/18 0356   PH 7.49* 7.48* 7.44 7.46*   PCO2 34* 35 39 37   PO2 107* 98 64* 125*   HCO3 26 26 26 26     Complete Blood Count     Recent Labs  Lab 03/01/18 0926 03/01/18 0356 02/28/18 2202 02/28/18  1639   WBC 10.4 10.3 12.1* 15.0*   HGB 7.8* 6.8* 7.5* 8.0*    167 180 205     Basic Metabolic Panel    Recent Labs  Lab 03/01/18 0926 03/01/18 0356 02/28/18 2202 02/28/18  1639    143 141 141   POTASSIUM 4.2 4.3 4.0 4.2   CHLORIDE 112* 110* 108 107   CO2 25 25 24 25   BUN 38* 37* 36* 36*   CR 1.30* 1.25* 1.26* 1.28*   * 125* 157* 122*     Liver Function Tests    Recent Labs  Lab 03/01/18 0926 03/01/18 0356 02/28/18 2202 02/28/18  1639  02/28/18  0357   AST  --  58*  --   --   --   --    ALT  --  172*  --   --   --  361*   ALKPHOS  --  37*  --   --   --   --    BILITOTAL  --  0.7  --   --   --   --    ALBUMIN  --  2.4*  --   --   --  2.4*   INR 1.37* 1.51* 1.58* 1.52*  < > 1.52*   < > = values in this interval not displayed.  Pancreatic Enzymes  No lab results found in last 7 days.  Coagulation Profile    Recent Labs  Lab 03/01/18 0926 03/01/18 0356 02/28/18 2202 02/28/18  1639   INR 1.37* 1.51* 1.58* 1.52*   * 102* 98* 81*     Lactate  Invalid input(s): LACTATE    5. RADIOLOGY:   Recent Results (from the past 24 hour(s))   XR Abdomen Port 1 View    Narrative    EXAMINATION: XR ABDOMEN PORT 1 VW, 2/28/2018 2:32 PM    COMPARISON: February 27, 2018    HISTORY: Verify small bowel feeding tube bedside placement;      FINDINGS:   Supine radiograph of the abdomen is obtained and reviewed. Portion of  the upper abdomen collimated out of view. Feeding tube tip projects  over the distal stomach. IVC ECMO cannula is similar in appearance  compared to chest x-ray same day. Left femoral catheter is unchanged.  OG tube sidehole overlies the stomach with tip not visualized.  Nondilated, air-filled loops of small or large bowel. Fibrotic changes  in  the right lung base. Large bore right femoral venous catheter  remains in place. Left sided femoral line tip at the level of the  pelvis. Phleboliths and vascular calcifications noted in the pelvis.      Impression    IMPRESSION: Feeding tube tip overlies the distal stomach near the  gastroesophageal junction.     I have personally reviewed the examination and initial interpretation  and I agree with the findings.    MEHNAZ ELAINE   XR Chest Port 1 View    Narrative    Exam: XR CHEST PORT 1 VW, 2/28/2018 6:59 PM    Indication: new left IJ placement;     Comparison: Earlier same day    Findings:   New left IJ central venous catheter tip projects over the mid SVC  directed inferiorly. Right IJ central venous catheter tip is stable  projecting over the mid SVC. Anterior projecting [place] projects over  the right atrium. OG tube tip and sidehole project over the stomach.  Feeding tube courses below the diaphragm, tip outside the  field-of-view. Endotracheal tube tip projects 5.4 cm above the gee.    Stable, normal cardiac mediastinal silhouette. Stable low lung  volumes. Pulmonary vasculature is distinct. Allowing for differences  in technique, no significant overall change in diffuse interstitial  and patchy basilar airspace opacities in the setting of pulmonary  fibrosis. No significant pleural effusion. No pneumothorax.      Impression    Impression:   1. New left IJ central venous catheter tip projects over the mid SVC.  New feeding tube courses below the diaphragm, tip outside the  field-of-view. Otherwise stable support devices.  2. Stable bilateral opacities in the setting of pulmonary fibrosis.    CHAUNCEY HUNTER MD ([place])   XR Chest Port 1 View    Narrative    XR CHEST PORT 1 VW  3/1/2018 2:17 AM    History: Endotracheal tube placement    Comparison: Prior day    Findings:   Single portable AP view of the chest is obtained. Endotracheal tube is  in stable position. Right internal jugular central venous  catheter tip  projects over the mid SVC. There is a right inferior approach ECMO  cannula with the tip projecting near the superior cavoatrial junction.  Postsurgical changes of a subclavian artery cutdown for ECMO access  projecting over the right shoulder. Gastric tube tip and sidehole  projecting over left upper quadrant. Feeding type tube tip projects  over the distal stomach.    The cardiomediastinal silhouette is not enlarged. Diffuse interstitial  fibrotic opacities are again noted. Stable appearance of the overlying  scattered patchy opacities, most significant in the left lateral lower  lobe. No suspected pleural effusion. No pneumothorax.      Impression    IMPRESSION:  1. Stable lines, tubes, and post surgical changes.  2. Stable diffuse interstitial opacities with overlying patchy  opacities which could represent atelectasis or airspace consolidation.    I have personally reviewed the examination and initial interpretation  and I agree with the findings.    YONATAN HERNANDES MD       =========================================

## 2018-03-01 NOTE — PROGRESS NOTES
Cardiothoracic surgery ECMO progress note    ECMO Day: 1    Type of ECMO: VA.    Indication:   1.  Acute hypoxic and hypercarbic respiratory failure and right heart failure.  2.  Interstitial lung disease.    Cannulation:  Right axillary arterial with Gelweave graft.  Right femoral venous.    Cannulation issues: hemostatic.    Goal ACT: 160-180.    RPM: 2900.  Flow: ~3L/min.  Sweep: 1 LPM.  FiO2: 0.8.    Issues/concerns: none.    Blood products: none.    Current coagulation status: Heparin drip with gial -180.  Current fluid status: Intravascular euvolemia      Plan: Switch venous cannula from right femoral to right IJ tomorrow using Seldinger technique with RIJ central line. Then mobilize and wean vent to extubate if possible. Awaiting lung transplant.    Toby Hernandez MD PhD

## 2018-03-01 NOTE — PROGRESS NOTES
"SPIRITUAL HEALTH SERVICES  SPIRITUAL ASSESSMENT Progress Note (Palliative Focus)  [facility] ([place]) 4E/OR    PRIMARY FOCUS:     Goals of care    Emotional/spiritual/[employer] distress    Support for coping    REFERRAL SOURCE: Palliative consult service spiritual assessment    ILLNESS CIRCUMSTANCES:   Reviewed documentation. Reflective conversation shared with family of patient Kecia Blue ( Ranjan primarily) which integrated elements of illness and family narratives.     Context of Serious Illness/Symptom(s) - Ranjan described course of Kecia's illness over the last few years. He and family expressed hope regarding lung transplant.    Resources for Support -   o Family:  Ranjan; children Yudelka, Evelyn, and Naseren; grandchildren Ananth (8 yo), Shirley (2.4 yo), and Bebeto (3 yo); mother, parents-in-law, siblings, extended family  o Alondra: Baptist    DISTRESS:     Emotional/Spiritual/Existential Distress - Ranjan said, \"It's been hard\" to watch Kecia's illness progress, yet they are very hopeful for possibility of lung transplant.    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    Emotional/Relational/Existential Connections - Family is of primary importance to Kecia, per Ranjan. Most family live locally to [place] (within 20 miles), and Ranjan said Kecia wants to be present for her children and grandchildren. Family cope through \"boisterous\" behavior, per Ranjan, laughing and joking with each other \"even when things are critical\".    GOALS OF CARE:    Goals of Care " "- Restorative, per Ranjan. \"We know it will be hard, but we'll do it together.\"    Meaning/Sense-Making - Family and family experience together is primary lens for making sense of Kecia's condition, per Ranjan.    PLAN: I will follow for spiritual support.    Heather Gutierrez  Palliative   Pager [phone]  [facility] Inpatient Team Consult pager [phone] (M-F 8-4:30)  After-hours Answering Service [phone]   "

## 2018-03-01 NOTE — PLAN OF CARE
Problem: Patient Care Overview  Goal: Plan of Care/Patient Progress Review  Outcome: No Change  I/A:  Pt is moderatey sedated on a versed drip, opens eyes to voice and follows commands, calm and cooperative.  Fentanyl drip infusing for pain management.  SB-SR 50s-70s, rare PACs.  MAPs >65.  CVP 6.  VA-ECMO ongoing, RPMs 2900, flows ~3, sweep 1, FiO2 70%, please refer to ECMO progress note for further details.  Current vent settings: CMV, FiO2 40%, RR 28, PEEP 12, .  LS coarse throughout, sputum cx sent per orders.  Flolan inhalation at 20 ng/kg/min.  UOP 20-50 cc/hr, urine cx sen per orderst.  250 ml albumin given for chugging on ECMO circuit.  One unit PRBC given for Hgb 6.7.  When SpO2 monitor was routinely repositioned, an area of nonblanchable redness was found underneath the monitor, charted in flowsheet and verified WOC RN has been consulted.  Insulin drip at 0.5 units/hr.      Transplant coordinator called at 0300 with news of finding lung transplant match, UNOS ID: [id number], plan for OR today at 1300.  Transplant coordinator provided with patient's 's phone number.  Charge RN and CVTS cross-cover MD notified.  Pre-op orders received, pt made NPO, labs drawn and sent, pre-op meds received.  Dr. Mcelroy requested to hold off on administering solumedrol 1000 mg IV until administration time can be verified with team in AM.      P:  OR today for bilateral lung transplant.

## 2018-03-01 NOTE — PROGRESS NOTES
Definition of CDC high risk donors: Any donor falling into one or more of the 7 specified behavioral categories (see above) is classified as a high risk donor (HRD) and subject to additional OPTN policies. Despite significant controversy surrounding their use, approximately 9% of donors where at least one organ is recovered are categorized as HRDs.    Category Description   MSM Men who have had sex with other men in the preceding 5yrs.   IDU Non medical IV, IM or SQ drug use in the preceding 5yrs.   Haemophiliac Individuals with clotting disorders who have received human-derived clotting factor concentrates   Prostitution Men or women engaged in this activity for exchange of money in the preceding 5yrs.   High risk sex Anybody who has had sex with people in the above category in the preceding 12months or with individuals known or suspected to have HIV   Exposed to HIV Persons who have been exposed in the preceding 12 months to known or suspected HIV infected blood through to HIV percutaneous inoculation or through contact with an open wound, nonintact skin or mucous membrane   FCI Inmates of correctional systems.     The OPTN classifies high infectious risk donors (HRDs) based on criteria originally intended to identify people at risk for HIV infection. These donors are sometimes referred to as 'CDC high risk donors' in reference to the CDC-published guidelines adopted by the OPTN.   However, these guidelines are also being used to identify  donors at increased risk of window period (WP) hepatitis C virus (HCV) infection or HIV infection, although not designed for this purpose.  HCV:  Nucleic acid testing (ARACELI) is a method based on detection of viral particles that shortens the WP to approximately 1 week.  Pooled HCV incidence was derived and used to estimate the risk of WP infection. Risks ranged from  0.027 to 32.4 based on nucleic acid testing (ARACELI).  Category  (Behaviours) WP for ARACELI  (per 10,000  donors) Percent   All Categories 0.027 - 32.4 0.86995   Risk of Window Period Hepatitis-C Infection in High Infectious Risk Donors: Systematic Review and [place]-Analysis. Am J Transplant. 2011 June; 11(6): 5884-8180.    HIV:  Nucleic acid testing (ARACELI) is a method that detects nucleic acid particles and has a shorter HIV WP of approximately 9 days.   Pooled HIV incidence estimates were calculated. Risks ranged from 0.04-4.9 based on nucleic acid testing (ARACELI).  Category  (Behaviours) WP for ARACELI  (per 10,000 donors) Percent   All Categories 0.04 - 4.9 [id number]     Risk of Window Period HIV Infection in High Infectious Risk Donors: Systematic Review and [place]-Analysis. Am J Transplant. 2011 June; 11(6): 0353-4731.      I discussed with pt's  that this donor is considered increased risk. I discussed the above risks with him and he is willing to proceed with lung transplantation.      Tarik Christensen MD.  Pulmonary and Critical Care.  03/01/2018  9:33 AM

## 2018-03-01 NOTE — PROGRESS NOTES
ECMO Shift Summary:    Patient remains on VA ECMO, all equipment is functioning and alarms are appropriately set. RPM's 2900 with flow range 3.06-3.23 L/min. Sweep gas is at 1 LPM and FiO2 70%. Circuit remains free of air, clot and fibrin. Cannulas are secure with some bleeding and oozing from site. Extremities are warm and perfused. Suctioned ETT for small creamy secretions.    Significant Shift Events:    Vent settings:  Ventilation Mode: CMV/AC  (Continuous Mandatory Ventilation/ Assist Control)  FiO2 (%): 40 %  Rate Set (breaths/minute): 28 breaths/min  Tidal Volume Set (mL): 350 mL  PEEP (cm H2O): 12 cmH2O  Oxygen Concentration (%): 40 %  Resp: 28.    Heparin is running at 1800 u/kg/hr, ACT range 168-176.    Urine output is good, blood loss was minimal. Product given included 250 ml Albumin and one unit PRBC for Hgb less than 7.      Intake/Output Summary (Last 24 hours) at 18 0558  Last data filed at 18 0500   Gross per 24 hour   Intake          2568.45 ml   Output             1215 ml   Net          1353.45 ml       ECHO:  No results found for this or any previous visit.  Results for orders placed during the hospital encounter of 18   Echocardiogram    Narrative [id number]  [facility]  [id number]  [id number]^SHERINE^HODAN^MR           [facility],[place]  Echocardiography Laboratory  [address] [place] [id number]     Name: SARAI KILLIAN  MRN: [id number]  : 1960  Study Date: 2018 04:27 PM  Age: 57 yrs  Gender: Female  Patient Location: [facility]  Reason For Study: SOB  Ordering Physician: HODAN BASURTO  Referring Physician: NIKUNJ ADAIR  Performed By: Geovany Aguilar RDCS     BSA: 1.5 m2  Height: 63 in  Weight: 113 lb  BP: 101/69 mmHg  _____________________________________________________________________________  __        Procedure  Complete Portable Echo Adult.  _____________________________________________________________________________  __         Interpretation Summary  The LV cavity is small and is underfilled. The Ejection Fraction is estimated  at 65-70%. Flattened septum is consistent with right ventricular pressure and  volume overload.  The RV free wall is severely hypokinetic with some preserved contractility of  the RV apex. This is consistent with McConnels sign and may be concerning for  acute pulmonary embolism in the right clinical setting.  There is lack of coaptation of the tricuspid leaflets due to severe annular  dilation. Right ventricular systolic pressure is 88.79mmHg above the right  atrial pressure. Moderate tricuspid insufficiency is present.  Small, 1.6cm pericardial effusion primarily localized anterior to the RV free  wall.     Compared to prior study on 2/19/2018, RV is more dilated, RV function has  worsened and estimated PA pressure is higher. Patient is intubated at the time  of the study.  Discussed with MICU team at 5:05pm.  _____________________________________________________________________________  __        Left Ventricle  The LV cavity is small and is underfilled. The Ejection Fraction is estimated  at 65-70%. Flattened septum is consistent with right ventricular pressure and  volume overload.     Right Ventricle  Severe right ventricular dilation is present. Global right ventricular  function is severely reduced. The RV free wall is severely hypokinetic with  some preserved contractility of the RV apex. This is consistent with McConnels  sign concerning for acute pulmonary embolism.     Atria  The left atrium appears normal. Moderate right atrial enlargement is present.     Mitral Valve  The mitral valve is normal.        Aortic Valve  The aortic valve cannot be assessed.     Tricuspid Valve  The tricuspid valve is normal. There is lack of coaptation of the tricuspid  leaflets due to severe annular dilation. Moderate tricuspid insufficiency is  present. Right ventricular systolic pressure is 88.79mmHg above the  right  atrial pressure.     Pulmonic Valve  The pulmonic valve cannot be assessed.     Vessels  The aorta root cannot be assessed. The inferior vena cava is normal. Unable to  assess mean RA pressure given the patient is on a ventilator.     Pericardium  Small, 1.6cm pericardial effusion primarily localized anterior to the RV free  wall.     _____________________________________________________________________________  __  MMode/2D Measurements & Calculations  RVDd: 5.1 cm  TAPSE: 1.3 cm           Doppler Measurements & Calculations  TV max P.7 mmHg  TR max herberth: 439.4 cm/sec  TR max P.7 mmHg     _____________________________________________________________________________  __           Report approved by: ANGELIKA Luna 2018 05:15 PM          CXR:  Recent Results (from the past 24 hour(s))   XR Abdomen Port 1 View    Narrative    EXAMINATION: XR ABDOMEN PORT 1 VW, 2018 9:37 AM    COMPARISON: 2018    HISTORY: OG tube verification;     FINDINGS: OG tube sidehole overlies the stomach with tip at the  gastric fundus. No air-filled dilated loops of small or large bowel.  Fibrotic changes in the lung bases. Large bore right femoral venous  catheter remains in place. Left sided femoral line tip at the level of  the pelvis.      Impression    IMPRESSION: OG tube tip overlies the stomach.     JAMES LAI MD   XR Abdomen Port 1 View    Narrative    EXAMINATION: XR ABDOMEN PORT 1 VW, 2018 2:32 PM    COMPARISON: 2018    HISTORY: Verify small bowel feeding tube bedside placement;      FINDINGS:   Supine radiograph of the abdomen is obtained and reviewed. Portion of  the upper abdomen collimated out of view. Feeding tube tip projects  over the distal stomach. IVC ECMO cannula is similar in appearance  compared to chest x-ray same day. Left femoral catheter is unchanged.  OG tube sidehole overlies the stomach with tip not visualized.  Nondilated, air-filled loops of small  or large bowel. Fibrotic changes  in the right lung base. Large bore right femoral venous catheter  remains in place. Left sided femoral line tip at the level of the  pelvis. Phleboliths and vascular calcifications noted in the pelvis.      Impression    IMPRESSION: Feeding tube tip overlies the distal stomach near the  gastroesophageal junction.     I have personally reviewed the examination and initial interpretation  and I agree with the findings.    MEHNAZ ELAINE   XR Chest Port 1 View    Narrative    Exam: XR CHEST PORT 1 VW, 2/28/2018 6:59 PM    Indication: new left IJ placement;     Comparison: Earlier same day    Findings:   New left IJ central venous catheter tip projects over the mid SVC  directed inferiorly. Right IJ central venous catheter tip is stable  projecting over the mid SVC. Anterior projecting [place] projects over  the right atrium. OG tube tip and sidehole project over the stomach.  Feeding tube courses below the diaphragm, tip outside the  field-of-view. Endotracheal tube tip projects 5.4 cm above the gee.    Stable, normal cardiac mediastinal silhouette. Stable low lung  volumes. Pulmonary vasculature is distinct. Allowing for differences  in technique, no significant overall change in diffuse interstitial  and patchy basilar airspace opacities in the setting of pulmonary  fibrosis. No significant pleural effusion. No pneumothorax.      Impression    Impression:   1. New left IJ central venous catheter tip projects over the mid SVC.  New feeding tube courses below the diaphragm, tip outside the  field-of-view. Otherwise stable support devices.  2. Stable bilateral opacities in the setting of pulmonary fibrosis.    CHAUNCEY HUNTER MD ([place])       Labs:    Recent Labs  Lab 03/01/18  0356 02/28/18  2351 02/28/18  2202 02/28/18 2001   PH 7.46* 7.48* 7.49* 7.47*   PCO2 37 35 35 36   PO2 125* 131* 102 102   HCO3 26 27 26 26   O2PER 40.0 40.0 40% 40%       Lab Results   Component Value Date    HGB  6.8 (LL) 03/01/2018    PHGB <30 03/01/2018     03/01/2018    FIBR 618 (H) 03/01/2018    INR 1.51 (H) 03/01/2018     (H) 03/01/2018    DD 1.1 (H) 03/01/2018    AXA 0.58 03/01/2018    ANTCH 87 02/28/2018         Plan is to go to the OR for a lung transplant this afternoon.      Kortney Hinds, RRT  3/1/2018 5:58 AM

## 2018-03-01 NOTE — PROGRESS NOTES
Transplant Social Work Services Progress Note      Date of Initial Social Work Evaluation: 2/20/2018  Collaborated with: pulm team.      Data: supportive visit this a.m. In ICU waiting room and again this afternoon in OR waiting room.  Family thrilled that donor has become available for patient and she is now in OR.  Very relieved, but also realistic about long recovery she will have.    Intervention: supportive counseling.  Discussed parking and hotel info as more and more family comes into town.  Talked through what to expect in next few days and different ways to support each other and the patient's grand children.    Assessment: supporting each other effectively.    Education provided by SW: as above in intervention.    Plan:    Discharge Plans in Progress: none    Barriers to d/c plan: patient currently in OR for transpalnt.      Follow up Plan: will continue to follow for support, counseling, education and resources.

## 2018-03-01 NOTE — PROGRESS NOTES
CVTS PROGRESS NOTE  February 28, 2018      CO-MORBIDITIES:   Hypotension, unspecified hypotension type  (primary encounter diagnosis)  Organ transplant candidate  Lung disease, interstitial (H)  Abnormal chest CT  Acute on chronic respiratory failure with hypoxia (H)  ILD (interstitial lung disease) (H)    ASSESSMENT: Kecia Blue is a 57 year old female with PMH significant for dermatomyositis on immunosuppression, eronegative RA, ILD, and pulmonary hypertension, who was admitted for emergent lung transplant work-up on 2/21 for increasing shortness of breath and hypoxia now POD # 1 s/p emergent VA ECMO placement on 2/27/18. Working to wean nitric and flolan as able.    PLAN:  Neuro/ pain/ sedation:  - Monitor neurological status. Notify the MD for any acute changes in exam.  - Fentanyl gtt for pain.  - Midazolam gtt for sedation.    Pulmonary care:  # Acute on chronic respiratory failure, worsening  # ILD related to dermatomyositis, on transplant list  # Severe pulmonary hypertension (WHO class III)  # Pneumomediastinum, 2/22/2018   - Supplemental oxygen via VA ECMO and ventilator to keep saturation above 92 %.  - Incentive spirometer every 15- 30 minutes when awake.  - Flolan 20mg/kg/min  - Nitric oxide at 20ppm-> 10ppm and continue to wean as able  - VA ECMO, q4h ABG    Cardiovascular:  # Right heart failure  # Pulmonary HTN    - Monitor hemodynamic status.   - Most likely 2/2 pulmonary hypertension from ILD, but did not rule out PE - unlikely given hypotension occurred after intubation and did not occur at the same time as her worsening hypoxia.    GI care:   - NPO except ice chips and medications.  - Place NJ today, trophic feeds as able    Fluids/ Electrolytes/ Nutrition:   - Periodic fluid bolus for IV fluid hydration  - Parenteral nutrition to start with placement of NG/NJ tube.    Renal/ Fluid Balance:  #RADHA-resolved    - Urine output is adequate so far.  - Will continue to monitor intake and  output.    Endocrine:    - Hydrocortisone per immunosuppressant needs.   - Insulin gtt     ID/ Antibiotics:  - Anti-rejection and immunosuppressant medications per transplant team including azathioprine, nystatin, Zosyn, Bactrim, [place]ro, Vanco.    Heme:     - Hemoglobin stable.   - q4h Hgb monitoring with ECMO protocol    Prophylaxis:    - Mechanical prophylaxis for DVT.   - Heparin chemical DVT prophylaxis due to high risk of bleeding for ECMO circuit  - Famotidine BID    Lines/ tubes/ drains:  - VA-ECMO right subclavian, right femoral to move to RIJ  - Move RIJ to LIJ, move arterial line from left femoral to left radial/axillary artery    Disposition:  - CVICU.     Kwesi Schwartz MD  Anesthesiology Resident CA2, PGY3      ====================================    TODAY'S PROGRESS:   SUBJECTIVE:   - VA ECMO overnight.      OBJECTIVE:   1. VITAL SIGNS:   Temp:  [93.6  F (34.2  C)-98.1  F (36.7  C)] 98.1  F (36.7  C)  Heart Rate:  [62-97] 67  Resp:  [13-29] 28  MAP:  [61 mmHg-86 mmHg] 65 mmHg  Arterial Line BP: ()/(54-81) 85/54  FiO2 (%):  [40 %-100 %] 40 %  SpO2:  [81 %-100 %] 100 %  Ventilation Mode: CMV/AC  (Continuous Mandatory Ventilation/ Assist Control)  FiO2 (%): 40 %  Rate Set (breaths/minute): 28 breaths/min  Tidal Volume Set (mL): 350 mL  PEEP (cm H2O): 12 cmH2O  Oxygen Concentration (%): 40 %  Resp: 28    2. INTAKE/ OUTPUT:   I/O last 3 completed shifts:  In: 2235.61 [I.V.:1485.61]  Out: 850 [Urine:850]    3. PHYSICAL EXAMINATION:   General: Sedated, NAD  Neuro: A&Ox3, NAD  Resp: Intubated, diminished lung sounds bilaterally  CV: RRR, No noted m/r/g  Abdomen: Soft, Non-distended  Incisions: c/d/i  Extremities: warm and well perfused    4. INVESTIGATIONS:   Arterial Blood Gases     Recent Labs  Lab 02/28/18  1803 02/28/18  1636 02/28/18  1353 02/28/18  1158   PH 7.50* 7.48* 7.48* 7.48*   PCO2 34* 35 35 34*   PO2 105 113* 139* 160*   HCO3 26 26 26 26     Complete Blood Count     Recent Labs  Lab  02/28/18  1639 02/28/18  0953 02/28/18  0357 02/27/18  2332   WBC 15.0* 15.2* 17.4* 19.4*   HGB 8.0* 8.7* 9.6* 11.0*    202 242 235     Basic Metabolic Panel    Recent Labs  Lab 02/28/18  1639 02/28/18  0953 02/28/18  0357 02/27/18  2332    141 140 139   POTASSIUM 4.2 3.8 4.1 4.4   CHLORIDE 107 106 106 105   CO2 25 24 23 23   BUN 36* 35* 35* 33*   CR 1.28* 1.23* 1.26* 1.10*   * 144* 142* 193*     Liver Function Tests    Recent Labs  Lab 02/28/18  1639 02/28/18  0953 02/28/18  0357   ALT  --   --  361*   ALBUMIN  --   --  2.4*   INR 1.52* 1.58* 1.52*     Pancreatic Enzymes  No lab results found in last 7 days.  Coagulation Profile    Recent Labs  Lab 02/28/18  1639 02/28/18  0953 02/28/18  0357   INR 1.52* 1.58* 1.52*   PTT 81* 52* 54*     Lactate  Invalid input(s): LACTATE    5. RADIOLOGY:   Recent Results (from the past 24 hour(s))   XR Chest Port 1 View    Narrative    XR CHEST PORT 1 VW  2/27/2018 10:30 PM    History: ECMO placement    Comparison: Earlier on the same day.    Findings:   Single portable AP view of the chest is obtained. Endotracheal tube is  in stable position approximately 3.7 cm above the gee. Right  internal jugular central venous catheter tip projects over the mid  SVC. There is a right inferior approach ECMO cannula with the tip  projecting near the superior cavoatrial junction. There is a gastric  type tube in the thoracic midline with the sidehole projecting over  the distal esophagus and the tip projecting over the proximal stomach.    The cardiomediastinal silhouette is not enlarged. Diffuse interstitial  fibrotic opacities are again noted. Stable appearance of the overlying  scattered patchy opacities. No suspected pleural effusion. No  pneumothorax.      Impression    IMPRESSION:  1.  New inferior approach ECMO cannula tip is near the superior  cavoatrial junction.  2.  Gastric tube sidehole projects over the distal esophagus, with the  tip projecting over the  proximal stomach. Consider advancement.  3.  Stable diffuse interstitial opacities with overlying patchy  opacities which could represent atelectasis or airspace consolidation.    I have personally reviewed the examination and initial interpretation  and I agree with the findings.    ANNE MANZANO MD   XR Abdomen Port 1 View    Narrative    XR ABDOMEN PORT 1 VW  2/27/2018 10:33 PM    History: Interval placement    Comparison: Radiograph from earlier on the same day    Findings:   A single portable view of the upper abdomen is obtained. There is a NG  tube with the sidehole projecting over the distal esophagus and the  tip projecting over the stomach. New inferior approach ECMO cannula  with the tip near the superior cavoatrial junction. Scattered  intraluminal bowel gas within nondilated small bowel.      Impression    IMPRESSION:  NG tube sidehole projects over the distal esophagus. Consider  advancement. ECMO cannula tip in the high right atrium.     I have personally reviewed the examination and initial interpretation  and I agree with the findings.    ANNE MANZANO MD   XR Chest Port 1 View    Narrative    XR CHEST PORT 1 VW  2/27/2018 10:30 PM    History: ECMO placement    Comparison: Prior day    Findings:   Single portable AP view of the chest is obtained. Endotracheal tube is  in stable position. Right internal jugular central venous catheter tip  projects over the mid SVC. There is a right inferior approach ECMO  cannula with the tip projecting near the superior cavoatrial junction.  Postsurgical changes of a Gelweave graft over the right shoulder.  Rounded opacity over the shoulder presumably related to that surgery.  There is a gastric type tube in the thoracic midline with the sidehole  projecting over the distal esophagus and the tip projecting over the  proximal stomach.    The cardiomediastinal silhouette is not enlarged. Diffuse interstitial  fibrotic opacities are again noted. Stable appearance of  the overlying  scattered patchy opacities, most significant in the left lateral lower  lobe. No suspected pleural effusion. No pneumothorax.      Impression    IMPRESSION:  1. Inferior approach ECMO cannula tip is near the superior cavoatrial  junction.  2. Gastric tube sidehole projects over the distal esophagus, with the  tip projecting over the proximal stomach. Consider advancement.  3. Stable diffuse interstitial opacities with overlying patchy  opacities which could represent atelectasis or airspace consolidation.    I have personally reviewed the examination and initial interpretation  and I agree with the findings.    ANNE MANZANO MD   XR Abdomen Port 1 View    Narrative    EXAMINATION: XR ABDOMEN PORT 1 VW, 2/28/2018 9:37 AM    COMPARISON: February 27, 2018    HISTORY: OG tube verification;     FINDINGS: OG tube sidehole overlies the stomach with tip at the  gastric fundus. No air-filled dilated loops of small or large bowel.  Fibrotic changes in the lung bases. Large bore right femoral venous  catheter remains in place. Left sided femoral line tip at the level of  the pelvis.      Impression    IMPRESSION: OG tube tip overlies the stomach.     JAMES LAI MD   XR Abdomen Port 1 View    Narrative    EXAMINATION: XR ABDOMEN PORT 1 VW, 2/28/2018 2:32 PM    COMPARISON: February 27, 2018    HISTORY: Verify small bowel feeding tube bedside placement;      FINDINGS:   Supine radiograph of the abdomen is obtained and reviewed. Portion of  the upper abdomen collimated out of view. Feeding tube tip projects  over the distal stomach. IVC ECMO cannula is similar in appearance  compared to chest x-ray same day. Left femoral catheter is unchanged.  OG tube sidehole overlies the stomach with tip not visualized.  Nondilated, air-filled loops of small or large bowel. Fibrotic changes  in the right lung base. Large bore right femoral venous catheter  remains in place. Left sided femoral line tip at the level of  the  pelvis. Phleboliths and vascular calcifications noted in the pelvis.      Impression    IMPRESSION: Feeding tube tip overlies the distal stomach near the  gastroesophageal junction.     I have personally reviewed the examination and initial interpretation  and I agree with the findings.    MEHNAZ ELAINE   XR Chest Port 1 View    Narrative    Exam: XR CHEST PORT 1 VW, 2/28/2018 6:59 PM    Indication: new left IJ placement;     Comparison: Earlier same day    Findings:   New left IJ central venous catheter tip projects over the mid SVC  directed inferiorly. Right IJ central venous catheter tip is stable  projecting over the mid SVC. Anterior projecting [place] projects over  the right atrium. OG tube tip and sidehole project over the stomach.  Feeding tube courses below the diaphragm, tip outside the  field-of-view. Endotracheal tube tip projects 5.4 cm above the gee.    Stable, normal cardiac mediastinal silhouette. Stable low lung  volumes. Pulmonary vasculature is distinct. Allowing for differences  in technique, no significant overall change in diffuse interstitial  and patchy basilar airspace opacities in the setting of pulmonary  fibrosis. No significant pleural effusion. No pneumothorax.      Impression    Impression:   1. New left IJ central venous catheter tip projects over the mid SVC.  New feeding tube courses below the diaphragm, tip outside the  field-of-view. Otherwise stable support devices.  2. Stable bilateral opacities in the setting of pulmonary fibrosis.    CHAUNCEY HUNTER MD ([place])       =========================================

## 2018-03-01 NOTE — PROGRESS NOTES
Received call from [facility] coordinator regarding UNOS [id number] MR# [id number] stating donors EBV-IGG status returned POSITIVE from lab.

## 2018-03-01 NOTE — ANESTHESIA PREPROCEDURE EVALUATION
Anesthesia Evaluation     .             ROS/MED HX    ENT/Pulmonary:       Neurologic:       Cardiovascular:     (+) ----. : . CHF etiology: PA HTN acute on VA ECMO  . . :. . pulmonary hypertension,       METS/Exercise Tolerance:     Hematologic:         Musculoskeletal:         GI/Hepatic:         Renal/Genitourinary:         Endo:         Psychiatric:         Infectious Disease:         Malignancy:         Other:                            Sarai Killian is a 57 year old female with PMH significant for dermatomyositis on immunosuppression, eronegative RA, ILD, and pulmonary hypertension, who was admitted for emergent lung transplant work-up on  for increasing shortness of breath and hypoxia now POD # 1 s/p emergent VA ECMO placement on 18.          Anesthesia Plan      History & Physical Review  History and physical reviewed and following examination; no interval change.    ASA Status:  5 .    NPO Status:  > 8 hours    Plan for General with Intravenous induction. Maintenance will be Balanced.      Additional equipment: Arterial Line, Central Line, Fiberoptic bronchoscope and CVP      Postoperative Care      Consents         No Known Allergies   Recent Results (from the past 4320 hour(s))   Echocardiogram    Narrative    [id number]  ECH19  [id number]  [id number]^SHERINE^HODAN^MR           [facility],[place]  Echocardiography Laboratory  [address] [place] [address]     Name: SARAI KILLIAN  MRN: [id number]  : 1960  Study Date: 2018 04:27 PM  Age: 57 yrs  Gender: Female  Patient Location: [facility]  Reason For Study: SOB  Ordering Physician: HODAN BASURTO  Referring Physician: NIKUNJ ADAIR  Performed By: Geovany Aguilar RDCS     BSA: 1.5 m2  Height: 63 in  Weight: 113 lb  BP: 101/69 mmHg  _____________________________________________________________________________  __        Procedure  Complete Portable Echo  Adult.  _____________________________________________________________________________  __        Interpretation Summary  The LV cavity is small and is underfilled. The Ejection Fraction is estimated  at 65-70%. Flattened septum is consistent with right ventricular pressure and  volume overload.  The RV free wall is severely hypokinetic with some preserved contractility of  the RV apex. This is consistent with McConnels sign and may be concerning for  acute pulmonary embolism in the right clinical setting.  There is lack of coaptation of the tricuspid leaflets due to severe annular  dilation. Right ventricular systolic pressure is 88.79mmHg above the right  atrial pressure. Moderate tricuspid insufficiency is present.  Small, 1.6cm pericardial effusion primarily localized anterior to the RV free  wall.     Compared to prior study on 2/19/2018, RV is more dilated, RV function has  worsened and estimated PA pressure is higher. Patient is intubated at the time  of the study.  Discussed with MICU team at 5:05pm.  _____________________________________________________________________________  __        Left Ventricle  The LV cavity is small and is underfilled. The Ejection Fraction is estimated  at 65-70%. Flattened septum is consistent with right ventricular pressure and  volume overload.     Right Ventricle  Severe right ventricular dilation is present. Global right ventricular  function is severely reduced. The RV free wall is severely hypokinetic with  some preserved contractility of the RV apex. This is consistent with McConnels  sign concerning for acute pulmonary embolism.     Atria  The left atrium appears normal. Moderate right atrial enlargement is present.     Mitral Valve  The mitral valve is normal.        Aortic Valve  The aortic valve cannot be assessed.     Tricuspid Valve  The tricuspid valve is normal. There is lack of coaptation of the tricuspid  leaflets due to severe annular dilation. Moderate  tricuspid insufficiency is  present. Right ventricular systolic pressure is 88.79mmHg above the right  atrial pressure.     Pulmonic Valve  The pulmonic valve cannot be assessed.     Vessels  The aorta root cannot be assessed. The inferior vena cava is normal. Unable to  assess mean RA pressure given the patient is on a ventilator.     Pericardium  Small, 1.6cm pericardial effusion primarily localized anterior to the RV free  wall.     _____________________________________________________________________________  __  MMode/2D Measurements & Calculations  RVDd: 5.1 cm  TAPSE: 1.3 cm           Doppler Measurements & Calculations  TV max P.7 mmHg  TR max herberth: 439.4 cm/sec  TR max P.7 mmHg     _____________________________________________________________________________  __           Report approved by: ANGELIKA Luna 2018 05:15 PM      Echo Complete Bubble    Narrative    [id number]  ECH09  [id number]  [id number]^GIOVANY^NIKUNJ^           [facility],[place]  Echocardiography Laboratory  [address] [place] [id number]     Name: SARAI KILLIAN  MRN: [id number]  : 1960  Study Date: 2018 01:37 PM  Age: 57 yrs  Gender: Female  Patient Location: [facility]  Reason For Study: , Organ transplant candidate, Lung disease, interstitial  (H), Sh  Ordering Physician: NIKUNJ ADAIR  Referring Physician: NIKUNJ ADAIR  Performed By: Geovany Aguilar RDCS     BSA: 1.6 m2  Height: 64 in  Weight: 120 lb  BP: 119/79 mmHg  _____________________________________________________________________________  __        Procedure  Bubble Echocardiogram with two-dimensional, color and spectral Doppler  performed.  _____________________________________________________________________________  __        Interpretation Summary  Left ventricular size is normal. Hyperdynamic LV function EF 65%-70%  Mild right ventricular dilation is present. Global right ventricular function  appears mildly reduced  (unable to visualize RV well).  Severe pulmonary hypertension. Right ventricular systolic pressure is 80mmHg  above the right atrial pressure (RAP). Unable to assess RAP  Flattened septum is consistent with right ventricular pressure and volume  overload.  Mild to moderate tricuspid insufficiency is present.  Small PFO demonstrated by bubble study at rest and with valsalva maneuver  Trivial pericardial effusion is present.  Previous study not available for comparison.     _____________________________________________________________________________  __        Left Ventricle  Left ventricular wall thickness is normal. Left ventricular size is normal.  Left ventricular diastolic function is indeterminate. Hyperdynamic LV function  EF 65%-70%. Flattened septum is consistent with right ventricular pressure and  volume overload.     Right Ventricle  Mild right ventricular dilation is present. Global right ventricular function  appears mildly reduced (unable to visualize RV well).     Atria  Both atria appear normal. Small PFO demonstrated by bubble study at rest and  with valsalva maneuver.     Mitral Valve  The mitral valve is normal. Mild mitral insufficiency is present.        Aortic Valve  Aortic valve is normal in structure and function. The aortic valve is  tricuspid.     Tricuspid Valve  Mild to moderate tricuspid insufficiency is present. Right ventricular  systolic pressure is 80mmHg above the right atrial pressure.     Pulmonic Valve  The pulmonic valve is normal.     Vessels  normal ascending aorta 3.1cm. The inferior vena cava cannot be assessed.     Pericardium  Trivial pericardial effusion is present.        Compared to Previous Study  Previous study not available for comparison.     Attestation  I have personally viewed the imaging and agree with the interpretation and  report as documented by the fellow, Miguel Oakes, and/or edited by  me.  _____________________________________________________________________________  __     MMode/2D Measurements & Calculations  RVDd: 4.2 cm  IVSd: 0.72 cm  LVIDd: 3.9 cm  LVIDs: 2.3 cm  LVPWd: 1.0 cm  FS: 40.9 %  EDV(Teich): 66.1 ml  ESV(Teich): 18.3 ml  LV mass(C)d: 101.9 grams  LV mass(C)dI: 64.7 grams/m2  asc Aorta Diam: 3.1 cm  LVOT diam: 2.3 cm  LVOT area: 4.1 cm2  LA Volume (BP): 43.1 ml  RWT: 0.53  TAPSE: 1.2 cm        Doppler Measurements & Calculations  MV E max herberth: 40.4 cm/sec  MV A max herberth: 57.0 cm/sec  MV E/A: 0.71  MV dec slope: 235.3 cm/sec2  MV dec time: 0.17 sec     TV max P.7 mmHg  TR max herberth: 434.6 cm/sec  TR max P.7 mmHg  E/E' av.6  Lateral E/e': 4.2  Medial E/e': 7.0     _____________________________________________________________________________  __           Report approved by: Glen Zhang 2018 04:03 PM        PAST MEDICAL HISTORY:   Past Medical History:   Diagnosis Date     Antisynthetase syndrome (H)      Chronic cough      Dermatomyositis (H)      Dysplasia of cervix, low grade (ESTRADA 1)      ILD (interstitial lung disease) (H)      Osteopenia      Pulmonary hypertension      Raynaud's disease      Seronegative rheumatoid arthritis (H)        PAST SURGICAL HISTORY:   Past Surgical History:   Procedure Laterality Date     ENT SURGERY      tonsillectomy as a child     INSERT EXTRACORPORAL MEMBRANE OXYGENATOR ADULT (OUTSIDE OR) N/A 2018    Procedure: INSERT EXTRACORPORAL MEMBRANE OXYGENATOR ADULT (OUTSIDE OR);  INSERT EXTRACORPORAL MEMBRANE OXYGENATOR ADULT (OUTSIDE OR) ;  Surgeon: Toby Hernandez MD;  Location:  OR     no prior surgery         FAMILY HISTORY:   Family History   Problem Relation Age of Onset     Hypertension Mother      Arthritis Mother      Pancreatic Cancer Father      DIABETES Father        SOCIAL HISTORY:   Social History   Substance Use Topics     Smoking status: Never Smoker     Smokeless tobacco: Never Used     Alcohol  use 0.6 oz/week     1 Glasses of wine per week      Comment: 1 glass 3 times weekly     Lab Results   Component Value Date    WBC 10.4 03/01/2018    HGB 7.8 (L) 03/01/2018    HCT 24.0 (L) 03/01/2018     03/01/2018    CHOL 168 02/19/2018    TRIG 115 02/19/2018    HDL 48 (L) 02/19/2018     (H) 03/01/2018    AST 58 (H) 03/01/2018     03/01/2018    BUN 38 (H) 03/01/2018    CO2 25 03/01/2018    TSH 3.07 02/19/2018    INR 1.37 (H) 03/01/2018     Prescriptions Prior to Admission   Medication Sig Dispense Refill Last Dose     order for DME Equipment being ordered: Oxygen 5 liters at rest, 15 liters with exertion.  Needs portability.  Please provide 2 10-liter concentrators for in the home 1 Device prn 2/21/2018 at Unknown time     AZATHIOPRINE PO Take 50 mg by mouth 2 times daily 50 mg tablet, 1.5 tablets by mouth 2 times a day   2/20/2018 at 2200     calcium carbonate (OS-CHELSEY 500 MG [place]) 1250 MG tablet Take 1 tablet by mouth 2 times daily   2/20/2018 at 2200     multivitamin, therapeutic with minerals (THERA-VIT-M) TABS tablet Take 1 tablet by mouth daily   2/20/2018 at 0800     PREDNISONE PO Take 5 mg by mouth daily   2/20/2018 at 0800     sulfamethoxazole-trimethoprim (BACTRIM DS/SEPTRA DS) 800-160 MG per tablet Take 1 tablet by mouth Every Mon, Wed, Fri Morning   Past Week at Unknown time     TACROLIMUS PO Take 0.1 mg by mouth 2 times daily 0.1 mg capsule, take 4 capsules by mouth 2 times a day.   2/20/2018 at 2200     nystatin (MYCOSTATIN) 681366 UNIT/ML suspension Take 100,000 Units by mouth 4 times daily 100,000 unit/ ml, take 4- 6 ml by mouth 4 times a day.   More than a month at Unknown time

## 2018-03-01 NOTE — PROGRESS NOTES
ECMO Shift Summary:    Patient remains on VA ECMO, all equipment is functioning and alarms are appropriately set. RPM's 2900 with flow range 3.1 L/min. Sweep gas is at 1 LPM and FiO2 80%. Circuit remains free of air, clot and fibrin. Cannulas are secure with no bleeding from site. Extremities are perfused. Suctioned ETT for scant secretions.    Significant Shift Events: None    Vent settings:  Ventilation Mode: CMV/AC  (Continuous Mandatory Ventilation/ Assist Control)  FiO2 (%): 40 %  Rate Set (breaths/minute): 28 breaths/min  Tidal Volume Set (mL): 350 mL  PEEP (cm H2O): 12 cmH2O  Oxygen Concentration (%): 40 %  Resp: 28.    Heparin is running at 1700 u/hr, ACT range 164-168.    Blood loss was small. No product was given.      Intake/Output Summary (Last 24 hours) at 18 2205  Last data filed at 18 2100   Gross per 24 hour   Intake          2755.95 ml   Output             1115 ml   Net          1640.95 ml       ECHO:  No results found for this or any previous visit.  Results for orders placed during the hospital encounter of 18   Echocardiogram    Narrative [id number]  ECH19  [id number]  [id number]^SHERINE^HODAN^MR           [facility],[place]  Echocardiography Laboratory  [address] [place] [address]     Name: SARAI KILLIAN  MRN: [id number]  : 1960  Study Date: 2018 04:27 PM  Age: 57 yrs  Gender: Female  Patient Location: [facility]  Reason For Study: SOB  Ordering Physician: HODAN BASURTO  Referring Physician: NIKUNJ ADAIR  Performed By: Geovany Aguilar RDCS     BSA: 1.5 m2  Height: 63 in  Weight: 113 lb  BP: 101/69 mmHg  _____________________________________________________________________________  __        Procedure  Complete Portable Echo Adult.  _____________________________________________________________________________  __        Interpretation Summary  The LV cavity is small and is underfilled. The Ejection Fraction is estimated  at  65-70%. Flattened septum is consistent with right ventricular pressure and  volume overload.  The RV free wall is severely hypokinetic with some preserved contractility of  the RV apex. This is consistent with McConnels sign and may be concerning for  acute pulmonary embolism in the right clinical setting.  There is lack of coaptation of the tricuspid leaflets due to severe annular  dilation. Right ventricular systolic pressure is 88.79mmHg above the right  atrial pressure. Moderate tricuspid insufficiency is present.  Small, 1.6cm pericardial effusion primarily localized anterior to the RV free  wall.     Compared to prior study on 2/19/2018, RV is more dilated, RV function has  worsened and estimated PA pressure is higher. Patient is intubated at the time  of the study.  Discussed with MICU team at 5:05pm.  _____________________________________________________________________________  __        Left Ventricle  The LV cavity is small and is underfilled. The Ejection Fraction is estimated  at 65-70%. Flattened septum is consistent with right ventricular pressure and  volume overload.     Right Ventricle  Severe right ventricular dilation is present. Global right ventricular  function is severely reduced. The RV free wall is severely hypokinetic with  some preserved contractility of the RV apex. This is consistent with McConnels  sign concerning for acute pulmonary embolism.     Atria  The left atrium appears normal. Moderate right atrial enlargement is present.     Mitral Valve  The mitral valve is normal.        Aortic Valve  The aortic valve cannot be assessed.     Tricuspid Valve  The tricuspid valve is normal. There is lack of coaptation of the tricuspid  leaflets due to severe annular dilation. Moderate tricuspid insufficiency is  present. Right ventricular systolic pressure is 88.79mmHg above the right  atrial pressure.     Pulmonic Valve  The pulmonic valve cannot be assessed.     Vessels  The aorta root  cannot be assessed. The inferior vena cava is normal. Unable to  assess mean RA pressure given the patient is on a ventilator.     Pericardium  Small, 1.6cm pericardial effusion primarily localized anterior to the RV free  wall.     _____________________________________________________________________________  __  MMode/2D Measurements & Calculations  RVDd: 5.1 cm  TAPSE: 1.3 cm           Doppler Measurements & Calculations  TV max P.7 mmHg  TR max herberth: 439.4 cm/sec  TR max P.7 mmHg     _____________________________________________________________________________  __           Report approved by: ANGELIKA Luna 2018 05:15 PM          CXR:  Recent Results (from the past 24 hour(s))   XR Chest Port 1 View    Narrative    XR CHEST PORT 1 VW  2018 10:30 PM    History: ECMO placement    Comparison: Earlier on the same day.    Findings:   Single portable AP view of the chest is obtained. Endotracheal tube is  in stable position approximately 3.7 cm above the gee. Right  internal jugular central venous catheter tip projects over the mid  SVC. There is a right inferior approach ECMO cannula with the tip  projecting near the superior cavoatrial junction. There is a gastric  type tube in the thoracic midline with the sidehole projecting over  the distal esophagus and the tip projecting over the proximal stomach.    The cardiomediastinal silhouette is not enlarged. Diffuse interstitial  fibrotic opacities are again noted. Stable appearance of the overlying  scattered patchy opacities. No suspected pleural effusion. No  pneumothorax.      Impression    IMPRESSION:  1.  New inferior approach ECMO cannula tip is near the superior  cavoatrial junction.  2.  Gastric tube sidehole projects over the distal esophagus, with the  tip projecting over the proximal stomach. Consider advancement.  3.  Stable diffuse interstitial opacities with overlying patchy  opacities which could represent atelectasis or airspace  consolidation.    I have personally reviewed the examination and initial interpretation  and I agree with the findings.    ANNE MANZANO MD   XR Abdomen Port 1 View    Narrative    XR ABDOMEN PORT 1 VW  2/27/2018 10:33 PM    History: Interval placement    Comparison: Radiograph from earlier on the same day    Findings:   A single portable view of the upper abdomen is obtained. There is a NG  tube with the sidehole projecting over the distal esophagus and the  tip projecting over the stomach. New inferior approach ECMO cannula  with the tip near the superior cavoatrial junction. Scattered  intraluminal bowel gas within nondilated small bowel.      Impression    IMPRESSION:  NG tube sidehole projects over the distal esophagus. Consider  advancement. ECMO cannula tip in the high right atrium.     I have personally reviewed the examination and initial interpretation  and I agree with the findings.    ANNE MANZANO MD   XR Chest Port 1 View    Narrative    XR CHEST PORT 1 VW  2/27/2018 10:30 PM    History: ECMO placement    Comparison: Prior day    Findings:   Single portable AP view of the chest is obtained. Endotracheal tube is  in stable position. Right internal jugular central venous catheter tip  projects over the mid SVC. There is a right inferior approach ECMO  cannula with the tip projecting near the superior cavoatrial junction.  Postsurgical changes of a Gelweave graft over the right shoulder.  Rounded opacity over the shoulder presumably related to that surgery.  There is a gastric type tube in the thoracic midline with the sidehole  projecting over the distal esophagus and the tip projecting over the  proximal stomach.    The cardiomediastinal silhouette is not enlarged. Diffuse interstitial  fibrotic opacities are again noted. Stable appearance of the overlying  scattered patchy opacities, most significant in the left lateral lower  lobe. No suspected pleural effusion. No pneumothorax.      Impression     IMPRESSION:  1. Inferior approach ECMO cannula tip is near the superior cavoatrial  junction.  2. Gastric tube sidehole projects over the distal esophagus, with the  tip projecting over the proximal stomach. Consider advancement.  3. Stable diffuse interstitial opacities with overlying patchy  opacities which could represent atelectasis or airspace consolidation.    I have personally reviewed the examination and initial interpretation  and I agree with the findings.    ANNE MANZANO MD   XR Abdomen Port 1 View    Narrative    EXAMINATION: XR ABDOMEN PORT 1 VW, 2/28/2018 9:37 AM    COMPARISON: February 27, 2018    HISTORY: OG tube verification;     FINDINGS: OG tube sidehole overlies the stomach with tip at the  gastric fundus. No air-filled dilated loops of small or large bowel.  Fibrotic changes in the lung bases. Large bore right femoral venous  catheter remains in place. Left sided femoral line tip at the level of  the pelvis.      Impression    IMPRESSION: OG tube tip overlies the stomach.     JAMES LAI MD   XR Abdomen Port 1 View    Narrative    EXAMINATION: XR ABDOMEN PORT 1 VW, 2/28/2018 2:32 PM    COMPARISON: February 27, 2018    HISTORY: Verify small bowel feeding tube bedside placement;      FINDINGS:   Supine radiograph of the abdomen is obtained and reviewed. Portion of  the upper abdomen collimated out of view. Feeding tube tip projects  over the distal stomach. IVC ECMO cannula is similar in appearance  compared to chest x-ray same day. Left femoral catheter is unchanged.  OG tube sidehole overlies the stomach with tip not visualized.  Nondilated, air-filled loops of small or large bowel. Fibrotic changes  in the right lung base. Large bore right femoral venous catheter  remains in place. Left sided femoral line tip at the level of the  pelvis. Phleboliths and vascular calcifications noted in the pelvis.      Impression    IMPRESSION: Feeding tube tip overlies the distal stomach near  the  gastroesophageal junction.     I have personally reviewed the examination and initial interpretation  and I agree with the findings.    MEHNAZ ELAINE   XR Chest Port 1 View    Narrative    Exam: XR CHEST PORT 1 VW, 2/28/2018 6:59 PM    Indication: new left IJ placement;     Comparison: Earlier same day    Findings:   New left IJ central venous catheter tip projects over the mid SVC  directed inferiorly. Right IJ central venous catheter tip is stable  projecting over the mid SVC. Anterior projecting [place] projects over  the right atrium. OG tube tip and sidehole project over the stomach.  Feeding tube courses below the diaphragm, tip outside the  field-of-view. Endotracheal tube tip projects 5.4 cm above the gee.    Stable, normal cardiac mediastinal silhouette. Stable low lung  volumes. Pulmonary vasculature is distinct. Allowing for differences  in technique, no significant overall change in diffuse interstitial  and patchy basilar airspace opacities in the setting of pulmonary  fibrosis. No significant pleural effusion. No pneumothorax.      Impression    Impression:   1. New left IJ central venous catheter tip projects over the mid SVC.  New feeding tube courses below the diaphragm, tip outside the  field-of-view. Otherwise stable support devices.  2. Stable bilateral opacities in the setting of pulmonary fibrosis.    CHAUNCEY HUNTER MD ([place])       Labs:    Recent Labs  Lab 02/28/18  2001 02/28/18  1803 02/28/18  1636 02/28/18  1353   PH 7.47* 7.50* 7.48* 7.48*   PCO2 36 34* 35 35   PO2 102 105 113* 139*   HCO3 26 26 26 26   O2PER 40% 40 40% 40.0       Lab Results   Component Value Date    HGB 8.0 (L) 02/28/2018    PHGB <30 02/28/2018     02/28/2018    FIBR 642 (H) 02/28/2018    INR 1.52 (H) 02/28/2018    PTT 81 (H) 02/28/2018    DD 0.9 (H) 02/28/2018    AXA 0.46 02/28/2018    ANTCH 87 02/28/2018         Plan is to continue VA ECMO.      Miguel Ángel Vazquez, RRT  2/28/2018 10:05 PM

## 2018-03-02 ENCOUNTER — APPOINTMENT (OUTPATIENT)
Dept: CARDIOLOGY | Facility: CLINIC | Age: 58
DRG: 003 | End: 2018-03-02
Attending: INTERNAL MEDICINE
Payer: COMMERCIAL

## 2018-03-02 ENCOUNTER — APPOINTMENT (OUTPATIENT)
Dept: GENERAL RADIOLOGY | Facility: CLINIC | Age: 58
DRG: 003 | End: 2018-03-02
Attending: ANESTHESIOLOGY
Payer: COMMERCIAL

## 2018-03-02 ENCOUNTER — RESULTS ONLY (OUTPATIENT)
Dept: OTHER | Facility: CLINIC | Age: 58
End: 2018-03-02

## 2018-03-02 ENCOUNTER — APPOINTMENT (OUTPATIENT)
Dept: GENERAL RADIOLOGY | Facility: CLINIC | Age: 58
DRG: 003 | End: 2018-03-02
Attending: THORACIC SURGERY (CARDIOTHORACIC VASCULAR SURGERY)
Payer: COMMERCIAL

## 2018-03-02 ENCOUNTER — APPOINTMENT (OUTPATIENT)
Dept: GENERAL RADIOLOGY | Facility: CLINIC | Age: 58
DRG: 003 | End: 2018-03-02
Attending: PHYSICIAN ASSISTANT
Payer: COMMERCIAL

## 2018-03-02 LAB
ABO + RH BLD: NORMAL
ABO + RH BLD: NORMAL
ALBUMIN SERPL-MCNC: 2.9 G/DL (ref 3.4–5)
ALT SERPL W P-5'-P-CCNC: 38 U/L (ref 0–50)
ANION GAP SERPL CALCULATED.3IONS-SCNC: 13 MMOL/L (ref 3–14)
ANION GAP SERPL CALCULATED.3IONS-SCNC: 6 MMOL/L (ref 3–14)
ANION GAP SERPL CALCULATED.3IONS-SCNC: 8 MMOL/L (ref 3–14)
ANION GAP SERPL CALCULATED.3IONS-SCNC: 9 MMOL/L (ref 3–14)
APTT PPP: 141 SEC (ref 22–37)
APTT PPP: 159 SEC (ref 22–37)
APTT PPP: 76 SEC (ref 22–37)
APTT PPP: >240 SEC (ref 22–37)
AT III ACT/NOR PPP CHRO: 46 % (ref 85–135)
BACTERIA SPEC CULT: NORMAL
BACTERIA SPEC CULT: NORMAL
BASE DEFICIT BLDA-SCNC: 2 MMOL/L
BASE DEFICIT BLDA-SCNC: 2.5 MMOL/L
BASE DEFICIT BLDA-SCNC: 2.7 MMOL/L
BASE DEFICIT BLDA-SCNC: 3.5 MMOL/L
BASE DEFICIT BLDA-SCNC: 4 MMOL/L
BASE DEFICIT BLDA-SCNC: 4.2 MMOL/L
BASE DEFICIT BLDA-SCNC: 4.3 MMOL/L
BASE DEFICIT BLDA-SCNC: 4.7 MMOL/L
BASE DEFICIT BLDA-SCNC: 4.8 MMOL/L
BASE DEFICIT BLDA-SCNC: 5.1 MMOL/L
BASE DEFICIT BLDA-SCNC: 5.3 MMOL/L
BASE DEFICIT BLDA-SCNC: 5.3 MMOL/L
BASE DEFICIT BLDA-SCNC: 5.4 MMOL/L
BASE DEFICIT BLDA-SCNC: 5.7 MMOL/L
BASE DEFICIT BLDA-SCNC: 5.9 MMOL/L
BASE DEFICIT BLDA-SCNC: 7 MMOL/L
BASE DEFICIT BLDA-SCNC: 7.9 MMOL/L
BASE DEFICIT BLDV-SCNC: 4.9 MMOL/L
BASE DEFICIT BLDV-SCNC: 5.3 MMOL/L
BASOPHILS # BLD AUTO: 0 10E9/L (ref 0–0.2)
BASOPHILS NFR BLD AUTO: 0.1 %
BLD GP AB SCN SERPL QL: NORMAL
BLD PROD TYP BPU: NORMAL
BLD UNIT ID BPU: 0
BLOOD BANK CMNT PATIENT-IMP: NORMAL
BLOOD PRODUCT CODE: NORMAL
BPU ID: NORMAL
BUN SERPL-MCNC: 37 MG/DL (ref 7–30)
BUN SERPL-MCNC: 39 MG/DL (ref 7–30)
BUN SERPL-MCNC: 44 MG/DL (ref 7–30)
BUN SERPL-MCNC: 49 MG/DL (ref 7–30)
CA-I BLD-MCNC: 4.4 MG/DL (ref 4.4–5.2)
CA-I BLD-MCNC: 4.5 MG/DL (ref 4.4–5.2)
CA-I BLD-MCNC: 4.5 MG/DL (ref 4.4–5.2)
CA-I BLD-MCNC: 4.6 MG/DL (ref 4.4–5.2)
CA-I BLD-MCNC: 4.6 MG/DL (ref 4.4–5.2)
CA-I BLD-MCNC: 4.7 MG/DL (ref 4.4–5.2)
CA-I BLD-MCNC: 4.7 MG/DL (ref 4.4–5.2)
CALCIUM SERPL-MCNC: 6.9 MG/DL (ref 8.5–10.1)
CALCIUM SERPL-MCNC: 6.9 MG/DL (ref 8.5–10.1)
CALCIUM SERPL-MCNC: 7 MG/DL (ref 8.5–10.1)
CALCIUM SERPL-MCNC: 7 MG/DL (ref 8.5–10.1)
CHLORIDE SERPL-SCNC: 115 MMOL/L (ref 94–109)
CHLORIDE SERPL-SCNC: 117 MMOL/L (ref 94–109)
CHLORIDE SERPL-SCNC: 117 MMOL/L (ref 94–109)
CHLORIDE SERPL-SCNC: 119 MMOL/L (ref 94–109)
CMV DNA SPEC NAA+PROBE-ACNC: ABNORMAL [IU]/ML
CMV DNA SPEC NAA+PROBE-ACNC: ABNORMAL [IU]/ML
CMV DNA SPEC NAA+PROBE-LOG#: ABNORMAL {LOG_IU}/ML
CMV DNA SPEC NAA+PROBE-LOG#: ABNORMAL {LOG_IU}/ML
CMV IGG SERPL QL IA: >8 AI (ref 0–0.8)
CO2 SERPL-SCNC: 20 MMOL/L (ref 20–32)
CO2 SERPL-SCNC: 22 MMOL/L (ref 20–32)
CREAT SERPL-MCNC: 1.23 MG/DL (ref 0.52–1.04)
CREAT SERPL-MCNC: 1.42 MG/DL (ref 0.52–1.04)
CREAT SERPL-MCNC: 1.43 MG/DL (ref 0.52–1.04)
CREAT SERPL-MCNC: 1.68 MG/DL (ref 0.52–1.04)
D DIMER PPP FEU-MCNC: 0.3 UG/ML FEU (ref 0–0.5)
D DIMER PPP FEU-MCNC: <0.3 UG/ML FEU (ref 0–0.5)
DIFFERENTIAL METHOD BLD: ABNORMAL
EBV VCA IGG SER QL IA: >8 AI (ref 0–0.8)
EOSINOPHIL # BLD AUTO: 0 10E9/L (ref 0–0.7)
EOSINOPHIL NFR BLD AUTO: 0 %
ERYTHROCYTE [DISTWIDTH] IN BLOOD BY AUTOMATED COUNT: 14.7 % (ref 10–15)
ERYTHROCYTE [DISTWIDTH] IN BLOOD BY AUTOMATED COUNT: 15.3 % (ref 10–15)
ERYTHROCYTE [DISTWIDTH] IN BLOOD BY AUTOMATED COUNT: 15.3 % (ref 10–15)
ERYTHROCYTE [DISTWIDTH] IN BLOOD BY AUTOMATED COUNT: 16.6 % (ref 10–15)
FIBRINOGEN PPP-MCNC: 216 MG/DL (ref 200–420)
FIBRINOGEN PPP-MCNC: 221 MG/DL (ref 200–420)
FIBRINOGEN PPP-MCNC: 234 MG/DL (ref 200–420)
GFR SERPL CREATININE-BSD FRML MDRD: 32 ML/MIN/1.7M2
GFR SERPL CREATININE-BSD FRML MDRD: 38 ML/MIN/1.7M2
GFR SERPL CREATININE-BSD FRML MDRD: 38 ML/MIN/1.7M2
GFR SERPL CREATININE-BSD FRML MDRD: 45 ML/MIN/1.7M2
GLUCOSE BLD-MCNC: 175 MG/DL (ref 70–99)
GLUCOSE BLD-MCNC: 206 MG/DL (ref 70–99)
GLUCOSE BLD-MCNC: 248 MG/DL (ref 70–99)
GLUCOSE BLDC GLUCOMTR-MCNC: 101 MG/DL (ref 70–99)
GLUCOSE BLDC GLUCOMTR-MCNC: 101 MG/DL (ref 70–99)
GLUCOSE BLDC GLUCOMTR-MCNC: 114 MG/DL (ref 70–99)
GLUCOSE BLDC GLUCOMTR-MCNC: 121 MG/DL (ref 70–99)
GLUCOSE BLDC GLUCOMTR-MCNC: 123 MG/DL (ref 70–99)
GLUCOSE BLDC GLUCOMTR-MCNC: 124 MG/DL (ref 70–99)
GLUCOSE BLDC GLUCOMTR-MCNC: 142 MG/DL (ref 70–99)
GLUCOSE BLDC GLUCOMTR-MCNC: 144 MG/DL (ref 70–99)
GLUCOSE BLDC GLUCOMTR-MCNC: 149 MG/DL (ref 70–99)
GLUCOSE BLDC GLUCOMTR-MCNC: 150 MG/DL (ref 70–99)
GLUCOSE BLDC GLUCOMTR-MCNC: 152 MG/DL (ref 70–99)
GLUCOSE BLDC GLUCOMTR-MCNC: 157 MG/DL (ref 70–99)
GLUCOSE BLDC GLUCOMTR-MCNC: 165 MG/DL (ref 70–99)
GLUCOSE BLDC GLUCOMTR-MCNC: 179 MG/DL (ref 70–99)
GLUCOSE BLDC GLUCOMTR-MCNC: 185 MG/DL (ref 70–99)
GLUCOSE BLDC GLUCOMTR-MCNC: 197 MG/DL (ref 70–99)
GLUCOSE BLDC GLUCOMTR-MCNC: 209 MG/DL (ref 70–99)
GLUCOSE BLDC GLUCOMTR-MCNC: 98 MG/DL (ref 70–99)
GLUCOSE SERPL-MCNC: 116 MG/DL (ref 70–99)
GLUCOSE SERPL-MCNC: 135 MG/DL (ref 70–99)
GLUCOSE SERPL-MCNC: 156 MG/DL (ref 70–99)
GLUCOSE SERPL-MCNC: 197 MG/DL (ref 70–99)
GRAM STN SPEC: NORMAL
HBV DNA SERPL NAA+PROBE-ACNC: NORMAL [IU]/ML
HBV DNA SERPL NAA+PROBE-LOG IU: NORMAL {LOG_IU}/ML
HCO3 BLD-SCNC: 19 MMOL/L (ref 21–28)
HCO3 BLD-SCNC: 19 MMOL/L (ref 21–28)
HCO3 BLD-SCNC: 20 MMOL/L (ref 21–28)
HCO3 BLD-SCNC: 21 MMOL/L (ref 21–28)
HCO3 BLD-SCNC: 21 MMOL/L (ref 21–28)
HCO3 BLD-SCNC: 22 MMOL/L (ref 21–28)
HCO3 BLD-SCNC: 24 MMOL/L (ref 21–28)
HCO3 BLD-SCNC: 25 MMOL/L (ref 21–28)
HCO3 BLDA-SCNC: 20 MMOL/L (ref 21–28)
HCO3 BLDA-SCNC: 22 MMOL/L (ref 21–28)
HCO3 BLDV-SCNC: 22 MMOL/L (ref 21–28)
HCO3 BLDV-SCNC: 23 MMOL/L (ref 21–28)
HCT VFR BLD AUTO: 22.4 % (ref 35–47)
HCT VFR BLD AUTO: 23.1 % (ref 35–47)
HCT VFR BLD AUTO: 24.2 % (ref 35–47)
HCT VFR BLD AUTO: 29.2 % (ref 35–47)
HGB BLD-MCNC: 10.2 G/DL (ref 11.7–15.7)
HGB BLD-MCNC: 6.8 G/DL (ref 11.7–15.7)
HGB BLD-MCNC: 7.5 G/DL (ref 11.7–15.7)
HGB BLD-MCNC: 7.9 G/DL (ref 11.7–15.7)
HGB BLD-MCNC: 8 G/DL (ref 11.7–15.7)
HGB BLD-MCNC: 8.4 G/DL (ref 11.7–15.7)
HGB BLD-MCNC: 8.5 G/DL (ref 11.7–15.7)
HGB FREE PLAS-MCNC: 30 MG/DL
HIV1 RNA # PLAS NAA DL=20: NORMAL {COPIES}/ML
HIV1 RNA SERPL NAA+PROBE-LOG#: NORMAL {LOG_COPIES}/ML
HSV1 IGG SERPL QL IA: >8 AI (ref 0–0.8)
HSV2 IGG SERPL QL IA: <0.2 AI (ref 0–0.8)
IMM GRANULOCYTES # BLD: 0.1 10E9/L (ref 0–0.4)
IMM GRANULOCYTES NFR BLD: 0.5 %
INR PPP: 1.45 (ref 0.86–1.14)
INR PPP: 1.51 (ref 0.86–1.14)
INR PPP: 1.52 (ref 0.86–1.14)
INR PPP: 1.62 (ref 0.86–1.14)
INR PPP: 1.76 (ref 0.86–1.14)
INTERPRETATION ECG - MUSE: NORMAL
LACTATE BLD-SCNC: 0.4 MMOL/L (ref 0.7–2)
LACTATE BLD-SCNC: 0.5 MMOL/L (ref 0.7–2)
LACTATE BLD-SCNC: 0.8 MMOL/L (ref 0.7–2)
LACTATE BLD-SCNC: 1.1 MMOL/L (ref 0.7–2)
LACTATE BLD-SCNC: 1.2 MMOL/L (ref 0.7–2)
LACTATE BLD-SCNC: 1.3 MMOL/L (ref 0.7–2)
LACTATE BLD-SCNC: 2.1 MMOL/L (ref 0.7–2)
LMWH PPP CHRO-ACNC: 0.23 IU/ML
LMWH PPP CHRO-ACNC: 0.34 IU/ML
LMWH PPP CHRO-ACNC: 0.53 IU/ML
LMWH PPP CHRO-ACNC: 0.91 IU/ML
LYMPHOCYTES # BLD AUTO: 0.4 10E9/L (ref 0.8–5.3)
LYMPHOCYTES NFR BLD AUTO: 2.9 %
Lab: NORMAL
Lab: NORMAL
MAGNESIUM SERPL-MCNC: 2.2 MG/DL (ref 1.6–2.3)
MAGNESIUM SERPL-MCNC: 2.2 MG/DL (ref 1.6–2.3)
MCH RBC QN AUTO: 30.4 PG (ref 26.5–33)
MCH RBC QN AUTO: 31.2 PG (ref 26.5–33)
MCH RBC QN AUTO: 31.6 PG (ref 26.5–33)
MCH RBC QN AUTO: 31.9 PG (ref 26.5–33)
MCHC RBC AUTO-ENTMCNC: 34.2 G/DL (ref 31.5–36.5)
MCHC RBC AUTO-ENTMCNC: 34.9 G/DL (ref 31.5–36.5)
MCHC RBC AUTO-ENTMCNC: 35.1 G/DL (ref 31.5–36.5)
MCHC RBC AUTO-ENTMCNC: 35.7 G/DL (ref 31.5–36.5)
MCV RBC AUTO: 86 FL (ref 78–100)
MCV RBC AUTO: 89 FL (ref 78–100)
MCV RBC AUTO: 89 FL (ref 78–100)
MCV RBC AUTO: 93 FL (ref 78–100)
MONOCYTES # BLD AUTO: 0.6 10E9/L (ref 0–1.3)
MONOCYTES NFR BLD AUTO: 4.5 %
NEUTROPHILS # BLD AUTO: 11.6 10E9/L (ref 1.6–8.3)
NEUTROPHILS NFR BLD AUTO: 92 %
NRBC # BLD AUTO: 0.1 10*3/UL
NRBC BLD AUTO-RTO: 0 /100
NUM BPU REQUESTED: 1
NUM BPU REQUESTED: 1
NUM BPU REQUESTED: 10
O2/TOTAL GAS SETTING VFR VENT: 100 %
O2/TOTAL GAS SETTING VFR VENT: 40 %
O2/TOTAL GAS SETTING VFR VENT: 60 %
O2/TOTAL GAS SETTING VFR VENT: 80 %
O2/TOTAL GAS SETTING VFR VENT: 80 %
OXYHGB MFR BLD: 92 % (ref 92–100)
OXYHGB MFR BLD: 93 % (ref 92–100)
OXYHGB MFR BLD: 93 % (ref 92–100)
OXYHGB MFR BLD: 96 % (ref 92–100)
OXYHGB MFR BLD: 96 % (ref 92–100)
OXYHGB MFR BLD: 97 % (ref 92–100)
OXYHGB MFR BLD: 98 % (ref 92–100)
OXYHGB MFR BLDA: 96 % (ref 75–100)
OXYHGB MFR BLDA: 97 % (ref 75–100)
OXYHGB MFR BLDV: 65 %
OXYHGB MFR BLDV: 70 %
PCO2 BLD: 27 MM HG (ref 35–45)
PCO2 BLD: 29 MM HG (ref 35–45)
PCO2 BLD: 32 MM HG (ref 35–45)
PCO2 BLD: 33 MM HG (ref 35–45)
PCO2 BLD: 34 MM HG (ref 35–45)
PCO2 BLD: 36 MM HG (ref 35–45)
PCO2 BLD: 36 MM HG (ref 35–45)
PCO2 BLD: 38 MM HG (ref 35–45)
PCO2 BLD: 38 MM HG (ref 35–45)
PCO2 BLD: 39 MM HG (ref 35–45)
PCO2 BLD: 41 MM HG (ref 35–45)
PCO2 BLD: 42 MM HG (ref 35–45)
PCO2 BLD: 42 MM HG (ref 35–45)
PCO2 BLD: 49 MM HG (ref 35–45)
PCO2 BLD: 56 MM HG (ref 35–45)
PCO2 BLD: 56 MM HG (ref 35–45)
PCO2 BLD: 93 MM HG (ref 35–45)
PCO2 BLDA: 42 MM HG (ref 35–45)
PCO2 BLDA: 53 MM HG (ref 35–45)
PCO2 BLDV: 47 MM HG (ref 40–50)
PCO2 BLDV: 55 MM HG (ref 40–50)
PH BLD: 7.02 PH (ref 7.35–7.45)
PH BLD: 7.17 PH (ref 7.35–7.45)
PH BLD: 7.25 PH (ref 7.35–7.45)
PH BLD: 7.26 PH (ref 7.35–7.45)
PH BLD: 7.3 PH (ref 7.35–7.45)
PH BLD: 7.3 PH (ref 7.35–7.45)
PH BLD: 7.32 PH (ref 7.35–7.45)
PH BLD: 7.33 PH (ref 7.35–7.45)
PH BLD: 7.34 PH (ref 7.35–7.45)
PH BLD: 7.35 PH (ref 7.35–7.45)
PH BLD: 7.36 PH (ref 7.35–7.45)
PH BLD: 7.37 PH (ref 7.35–7.45)
PH BLD: 7.39 PH (ref 7.35–7.45)
PH BLD: 7.39 PH (ref 7.35–7.45)
PH BLD: 7.42 PH (ref 7.35–7.45)
PH BLD: 7.42 PH (ref 7.35–7.45)
PH BLD: 7.49 PH (ref 7.35–7.45)
PH BLDA: 7.23 PH (ref 7.35–7.45)
PH BLDA: 7.29 PH (ref 7.35–7.45)
PH BLDV: 7.22 PH (ref 7.32–7.43)
PH BLDV: 7.27 PH (ref 7.32–7.43)
PHOSPHATE SERPL-MCNC: 4 MG/DL (ref 2.5–4.5)
PLATELET # BLD AUTO: 100 10E9/L (ref 150–450)
PLATELET # BLD AUTO: 102 10E9/L (ref 150–450)
PLATELET # BLD AUTO: 86 10E9/L (ref 150–450)
PLATELET # BLD AUTO: 92 10E9/L (ref 150–450)
PO2 BLD: 131 MM HG (ref 80–105)
PO2 BLD: 139 MM HG (ref 80–105)
PO2 BLD: 142 MM HG (ref 80–105)
PO2 BLD: 188 MM HG (ref 80–105)
PO2 BLD: 207 MM HG (ref 80–105)
PO2 BLD: 209 MM HG (ref 80–105)
PO2 BLD: 267 MM HG (ref 80–105)
PO2 BLD: 282 MM HG (ref 80–105)
PO2 BLD: 330 MM HG (ref 80–105)
PO2 BLD: 349 MM HG (ref 80–105)
PO2 BLD: 385 MM HG (ref 80–105)
PO2 BLD: 416 MM HG (ref 80–105)
PO2 BLD: 417 MM HG (ref 80–105)
PO2 BLD: 487 MM HG (ref 80–105)
PO2 BLD: 66 MM HG (ref 80–105)
PO2 BLD: 67 MM HG (ref 80–105)
PO2 BLD: 95 MM HG (ref 80–105)
PO2 BLDA: 158 MM HG (ref 80–105)
PO2 BLDA: 348 MM HG (ref 80–105)
PO2 BLDV: 39 MM HG (ref 25–47)
PO2 BLDV: 39 MM HG (ref 25–47)
POTASSIUM BLD-SCNC: 3.3 MMOL/L (ref 3.4–5.3)
POTASSIUM BLD-SCNC: 4.1 MMOL/L (ref 3.4–5.3)
POTASSIUM BLD-SCNC: 4.5 MMOL/L (ref 3.4–5.3)
POTASSIUM SERPL-SCNC: 3.8 MMOL/L (ref 3.4–5.3)
POTASSIUM SERPL-SCNC: 3.8 MMOL/L (ref 3.4–5.3)
POTASSIUM SERPL-SCNC: 4 MMOL/L (ref 3.4–5.3)
POTASSIUM SERPL-SCNC: 4 MMOL/L (ref 3.4–5.3)
RBC # BLD AUTO: 2.48 10E12/L (ref 3.8–5.2)
RBC # BLD AUTO: 2.53 10E12/L (ref 3.8–5.2)
RBC # BLD AUTO: 2.8 10E12/L (ref 3.8–5.2)
RBC # BLD AUTO: 3.27 10E12/L (ref 3.8–5.2)
SODIUM BLD-SCNC: 143 MMOL/L (ref 133–144)
SODIUM SERPL-SCNC: 145 MMOL/L (ref 133–144)
SODIUM SERPL-SCNC: 148 MMOL/L (ref 133–144)
SPECIMEN EXP DATE BLD: NORMAL
SPECIMEN SOURCE: ABNORMAL
SPECIMEN SOURCE: ABNORMAL
SPECIMEN SOURCE: NORMAL
TACROLIMUS BLD-MCNC: 19.4 UG/L (ref 5–15)
TACROLIMUS BLD-MCNC: 19.9 UG/L (ref 5–15)
TME LAST DOSE: ABNORMAL H
TME LAST DOSE: ABNORMAL H
TRANSFUSION STATUS PATIENT QL: NORMAL
VANCOMYCIN SERPL-MCNC: 12.2 MG/L
WBC # BLD AUTO: 10.6 10E9/L (ref 4–11)
WBC # BLD AUTO: 11.9 10E9/L (ref 4–11)
WBC # BLD AUTO: 12.6 10E9/L (ref 4–11)
WBC # BLD AUTO: 8.8 10E9/L (ref 4–11)

## 2018-03-02 PROCEDURE — 84460 ALANINE AMINO (ALT) (SGPT): CPT | Performed by: THORACIC SURGERY (CARDIOTHORACIC VASCULAR SURGERY)

## 2018-03-02 PROCEDURE — 86850 RBC ANTIBODY SCREEN: CPT | Performed by: STUDENT IN AN ORGANIZED HEALTH CARE EDUCATION/TRAINING PROGRAM

## 2018-03-02 PROCEDURE — 94644 CONT INHLJ TX 1ST HOUR: CPT

## 2018-03-02 PROCEDURE — 25000128 H RX IP 250 OP 636: Performed by: INTERNAL MEDICINE

## 2018-03-02 PROCEDURE — 93325 DOPPLER ECHO COLOR FLOW MAPG: CPT

## 2018-03-02 PROCEDURE — 85384 FIBRINOGEN ACTIVITY: CPT | Performed by: THORACIC SURGERY (CARDIOTHORACIC VASCULAR SURGERY)

## 2018-03-02 PROCEDURE — 27210995 ZZH RX 272: Performed by: STUDENT IN AN ORGANIZED HEALTH CARE EDUCATION/TRAINING PROGRAM

## 2018-03-02 PROCEDURE — 27210429 ZZH NUTRITION PRODUCT INTERMEDIATE LITER

## 2018-03-02 PROCEDURE — 86900 BLOOD TYPING SEROLOGIC ABO: CPT | Performed by: STUDENT IN AN ORGANIZED HEALTH CARE EDUCATION/TRAINING PROGRAM

## 2018-03-02 PROCEDURE — 85379 FIBRIN DEGRADATION QUANT: CPT | Performed by: THORACIC SURGERY (CARDIOTHORACIC VASCULAR SURGERY)

## 2018-03-02 PROCEDURE — 94645 CONT INHLJ TX EACH ADDL HOUR: CPT

## 2018-03-02 PROCEDURE — 25000132 ZZH RX MED GY IP 250 OP 250 PS 637: Performed by: THORACIC SURGERY (CARDIOTHORACIC VASCULAR SURGERY)

## 2018-03-02 PROCEDURE — 25000132 ZZH RX MED GY IP 250 OP 250 PS 637: Performed by: PHYSICIAN ASSISTANT

## 2018-03-02 PROCEDURE — 82805 BLOOD GASES W/O2 SATURATION: CPT | Performed by: SURGERY

## 2018-03-02 PROCEDURE — 25000128 H RX IP 250 OP 636: Performed by: STUDENT IN AN ORGANIZED HEALTH CARE EDUCATION/TRAINING PROGRAM

## 2018-03-02 PROCEDURE — 80197 ASSAY OF TACROLIMUS: CPT | Performed by: STUDENT IN AN ORGANIZED HEALTH CARE EDUCATION/TRAINING PROGRAM

## 2018-03-02 PROCEDURE — 93308 TTE F-UP OR LMTD: CPT | Mod: 26 | Performed by: INTERNAL MEDICINE

## 2018-03-02 PROCEDURE — 40000014 ZZH STATISTIC ARTERIAL MONITORING DAILY

## 2018-03-02 PROCEDURE — 25000125 ZZHC RX 250: Performed by: THORACIC SURGERY (CARDIOTHORACIC VASCULAR SURGERY)

## 2018-03-02 PROCEDURE — 86923 COMPATIBILITY TEST ELECTRIC: CPT | Performed by: STUDENT IN AN ORGANIZED HEALTH CARE EDUCATION/TRAINING PROGRAM

## 2018-03-02 PROCEDURE — 25000128 H RX IP 250 OP 636

## 2018-03-02 PROCEDURE — P9041 ALBUMIN (HUMAN),5%, 50ML: HCPCS | Performed by: SURGERY

## 2018-03-02 PROCEDURE — 25000131 ZZH RX MED GY IP 250 OP 636 PS 637: Performed by: THORACIC SURGERY (CARDIOTHORACIC VASCULAR SURGERY)

## 2018-03-02 PROCEDURE — 87040 BLOOD CULTURE FOR BACTERIA: CPT | Performed by: STUDENT IN AN ORGANIZED HEALTH CARE EDUCATION/TRAINING PROGRAM

## 2018-03-02 PROCEDURE — 00000146 ZZHCL STATISTIC GLUCOSE BY METER IP

## 2018-03-02 PROCEDURE — P9037 PLATE PHERES LEUKOREDU IRRAD: HCPCS | Performed by: THORACIC SURGERY (CARDIOTHORACIC VASCULAR SURGERY)

## 2018-03-02 PROCEDURE — 93325 DOPPLER ECHO COLOR FLOW MAPG: CPT | Mod: 26 | Performed by: INTERNAL MEDICINE

## 2018-03-02 PROCEDURE — 83735 ASSAY OF MAGNESIUM: CPT | Performed by: THORACIC SURGERY (CARDIOTHORACIC VASCULAR SURGERY)

## 2018-03-02 PROCEDURE — 25000128 H RX IP 250 OP 636: Performed by: THORACIC SURGERY (CARDIOTHORACIC VASCULAR SURGERY)

## 2018-03-02 PROCEDURE — 86901 BLOOD TYPING SEROLOGIC RH(D): CPT | Performed by: STUDENT IN AN ORGANIZED HEALTH CARE EDUCATION/TRAINING PROGRAM

## 2018-03-02 PROCEDURE — 71045 X-RAY EXAM CHEST 1 VIEW: CPT

## 2018-03-02 PROCEDURE — 82330 ASSAY OF CALCIUM: CPT | Performed by: THORACIC SURGERY (CARDIOTHORACIC VASCULAR SURGERY)

## 2018-03-02 PROCEDURE — 85347 COAGULATION TIME ACTIVATED: CPT

## 2018-03-02 PROCEDURE — 82805 BLOOD GASES W/O2 SATURATION: CPT | Performed by: THORACIC SURGERY (CARDIOTHORACIC VASCULAR SURGERY)

## 2018-03-02 PROCEDURE — 33949 ECMO/ECLS DAILY MGMT ARTERY: CPT

## 2018-03-02 PROCEDURE — 40000344 ZZHCL STATISTIC THAWING COMPONENT: Performed by: SURGERY

## 2018-03-02 PROCEDURE — 93321 DOPPLER ECHO F-UP/LMTD STD: CPT | Mod: 26 | Performed by: INTERNAL MEDICINE

## 2018-03-02 PROCEDURE — 94003 VENT MGMT INPAT SUBQ DAY: CPT

## 2018-03-02 PROCEDURE — 80069 RENAL FUNCTION PANEL: CPT | Performed by: THORACIC SURGERY (CARDIOTHORACIC VASCULAR SURGERY)

## 2018-03-02 PROCEDURE — 83051 HEMOGLOBIN PLASMA: CPT | Performed by: THORACIC SURGERY (CARDIOTHORACIC VASCULAR SURGERY)

## 2018-03-02 PROCEDURE — 80202 ASSAY OF VANCOMYCIN: CPT | Performed by: SURGERY

## 2018-03-02 PROCEDURE — 85300 ANTITHROMBIN III ACTIVITY: CPT | Performed by: THORACIC SURGERY (CARDIOTHORACIC VASCULAR SURGERY)

## 2018-03-02 PROCEDURE — 36415 COLL VENOUS BLD VENIPUNCTURE: CPT | Performed by: STUDENT IN AN ORGANIZED HEALTH CARE EDUCATION/TRAINING PROGRAM

## 2018-03-02 PROCEDURE — P9016 RBC LEUKOCYTES REDUCED: HCPCS | Performed by: INTERNAL MEDICINE

## 2018-03-02 PROCEDURE — 40000986 XR ABDOMEN PORT 1 VW

## 2018-03-02 PROCEDURE — P9059 PLASMA, FRZ BETWEEN 8-24HOUR: HCPCS | Performed by: SURGERY

## 2018-03-02 PROCEDURE — 85730 THROMBOPLASTIN TIME PARTIAL: CPT | Performed by: THORACIC SURGERY (CARDIOTHORACIC VASCULAR SURGERY)

## 2018-03-02 PROCEDURE — 99291 CRITICAL CARE FIRST HOUR: CPT | Mod: GC | Performed by: ANESTHESIOLOGY

## 2018-03-02 PROCEDURE — P9016 RBC LEUKOCYTES REDUCED: HCPCS | Performed by: STUDENT IN AN ORGANIZED HEALTH CARE EDUCATION/TRAINING PROGRAM

## 2018-03-02 PROCEDURE — G0463 HOSPITAL OUTPT CLINIC VISIT: HCPCS

## 2018-03-02 PROCEDURE — 80048 BASIC METABOLIC PNL TOTAL CA: CPT | Performed by: THORACIC SURGERY (CARDIOTHORACIC VASCULAR SURGERY)

## 2018-03-02 PROCEDURE — 80197 ASSAY OF TACROLIMUS: CPT | Performed by: THORACIC SURGERY (CARDIOTHORACIC VASCULAR SURGERY)

## 2018-03-02 PROCEDURE — 85520 HEPARIN ASSAY: CPT | Performed by: THORACIC SURGERY (CARDIOTHORACIC VASCULAR SURGERY)

## 2018-03-02 PROCEDURE — 25000132 ZZH RX MED GY IP 250 OP 250 PS 637: Performed by: STUDENT IN AN ORGANIZED HEALTH CARE EDUCATION/TRAINING PROGRAM

## 2018-03-02 PROCEDURE — 83605 ASSAY OF LACTIC ACID: CPT | Performed by: THORACIC SURGERY (CARDIOTHORACIC VASCULAR SURGERY)

## 2018-03-02 PROCEDURE — 85027 COMPLETE CBC AUTOMATED: CPT | Performed by: THORACIC SURGERY (CARDIOTHORACIC VASCULAR SURGERY)

## 2018-03-02 PROCEDURE — 85610 PROTHROMBIN TIME: CPT | Performed by: THORACIC SURGERY (CARDIOTHORACIC VASCULAR SURGERY)

## 2018-03-02 PROCEDURE — 85025 COMPLETE CBC W/AUTO DIFF WBC: CPT | Performed by: THORACIC SURGERY (CARDIOTHORACIC VASCULAR SURGERY)

## 2018-03-02 PROCEDURE — 71045 X-RAY EXAM CHEST 1 VIEW: CPT | Mod: 77

## 2018-03-02 PROCEDURE — 20000004 ZZH R&B ICU UMMC

## 2018-03-02 PROCEDURE — 25000128 H RX IP 250 OP 636: Performed by: SURGERY

## 2018-03-02 PROCEDURE — 40000275 ZZH STATISTIC RCP TIME EA 10 MIN

## 2018-03-02 PROCEDURE — P9041 ALBUMIN (HUMAN),5%, 50ML: HCPCS

## 2018-03-02 RX ORDER — ALBUMIN, HUMAN INJ 5% 5 %
12.5 SOLUTION INTRAVENOUS ONCE
Status: COMPLETED | OUTPATIENT
Start: 2018-03-02 | End: 2018-03-02

## 2018-03-02 RX ORDER — ALBUMIN, HUMAN INJ 5% 5 %
SOLUTION INTRAVENOUS
Status: COMPLETED
Start: 2018-03-02 | End: 2018-03-02

## 2018-03-02 RX ORDER — ALBUMIN, HUMAN INJ 5% 5 %
12.5 SOLUTION INTRAVENOUS ONCE
Status: DISCONTINUED | OUTPATIENT
Start: 2018-03-02 | End: 2018-03-02

## 2018-03-02 RX ORDER — AMINO ACIDS/PROTEIN HYDROLYS 11G-40/45
1 LIQUID IN PACKET (ML) ORAL 2 TIMES DAILY
Status: DISCONTINUED | OUTPATIENT
Start: 2018-03-02 | End: 2018-03-07 | Stop reason: CLARIF

## 2018-03-02 RX ADMIN — VASOPRESSIN 2 UNITS/HR: 20 INJECTION, SOLUTION INTRAMUSCULAR; SUBCUTANEOUS at 07:36

## 2018-03-02 RX ADMIN — EPINEPHRINE 0.06 MCG/KG/MIN: 1 INJECTION PARENTERAL at 16:34

## 2018-03-02 RX ADMIN — POTASSIUM CHLORIDE 20 MEQ: 1.5 POWDER, FOR SOLUTION ORAL at 22:33

## 2018-03-02 RX ADMIN — METHYLPREDNISOLONE SODIUM SUCCINATE 125 MG: 125 INJECTION, POWDER, LYOPHILIZED, FOR SOLUTION INTRAMUSCULAR; INTRAVENOUS at 18:20

## 2018-03-02 RX ADMIN — NYSTATIN 1000000 UNITS: 500000 SUSPENSION ORAL at 12:36

## 2018-03-02 RX ADMIN — PIPERACILLIN AND TAZOBACTAM 3.38 G: 3; .375 INJECTION, POWDER, LYOPHILIZED, FOR SOLUTION INTRAVENOUS; PARENTERAL at 10:29

## 2018-03-02 RX ADMIN — MYCOPHENOLATE MOFETIL 1500 MG: 200 POWDER, FOR SUSPENSION ORAL at 17:46

## 2018-03-02 RX ADMIN — Medication 1 PACKET: at 12:36

## 2018-03-02 RX ADMIN — EPOPROSTENOL 20 NG/KG/MIN: 1.5 INJECTION, POWDER, LYOPHILIZED, FOR SOLUTION INTRAVENOUS at 02:10

## 2018-03-02 RX ADMIN — ACETAMINOPHEN 975 MG: 325 TABLET, FILM COATED ORAL at 12:50

## 2018-03-02 RX ADMIN — EPOPROSTENOL 20 NG/KG/MIN: 1.5 INJECTION, POWDER, LYOPHILIZED, FOR SOLUTION INTRAVENOUS at 19:54

## 2018-03-02 RX ADMIN — PANTOPRAZOLE SODIUM 40 MG: 40 INJECTION, POWDER, FOR SOLUTION INTRAVENOUS at 09:11

## 2018-03-02 RX ADMIN — FENTANYL CITRATE 100 MCG/HR: 50 INJECTION, SOLUTION INTRAMUSCULAR; INTRAVENOUS at 17:22

## 2018-03-02 RX ADMIN — ALBUMIN HUMAN 12.5 G: 0.05 INJECTION, SOLUTION INTRAVENOUS at 20:33

## 2018-03-02 RX ADMIN — HUMAN INSULIN 7 UNITS/HR: 100 INJECTION, SOLUTION SUBCUTANEOUS at 02:10

## 2018-03-02 RX ADMIN — DOCUSATE SODIUM 100 MG: 50 LIQUID ORAL at 09:11

## 2018-03-02 RX ADMIN — ACETAMINOPHEN 975 MG: 325 TABLET, FILM COATED ORAL at 04:14

## 2018-03-02 RX ADMIN — DOCUSATE SODIUM 100 MG: 50 LIQUID ORAL at 20:34

## 2018-03-02 RX ADMIN — Medication 1 PACKET: at 20:33

## 2018-03-02 RX ADMIN — SULFAMETHOXAZOLE AND TRIMETHOPRIM 80 MG: 200; 40 SUSPENSION ORAL at 09:12

## 2018-03-02 RX ADMIN — MINERAL OIL AND WHITE PETROLATUM: 150; 830 OINTMENT OPHTHALMIC at 04:14

## 2018-03-02 RX ADMIN — VANCOMYCIN HYDROCHLORIDE 1000 MG: 1 INJECTION, SOLUTION INTRAVENOUS at 15:22

## 2018-03-02 RX ADMIN — MINERAL OIL AND WHITE PETROLATUM: 150; 830 OINTMENT OPHTHALMIC at 10:35

## 2018-03-02 RX ADMIN — ALBUMIN HUMAN 12.5 G: 0.05 INJECTION, SOLUTION INTRAVENOUS at 08:30

## 2018-03-02 RX ADMIN — FENTANYL CITRATE 100 MCG/HR: 50 INJECTION, SOLUTION INTRAMUSCULAR; INTRAVENOUS at 02:41

## 2018-03-02 RX ADMIN — MINERAL OIL AND WHITE PETROLATUM: 150; 830 OINTMENT OPHTHALMIC at 15:58

## 2018-03-02 RX ADMIN — METHYLPREDNISOLONE SODIUM SUCCINATE 125 MG: 125 INJECTION, POWDER, LYOPHILIZED, FOR SOLUTION INTRAMUSCULAR; INTRAVENOUS at 02:45

## 2018-03-02 RX ADMIN — NYSTATIN 1000000 UNITS: 500000 SUSPENSION ORAL at 20:33

## 2018-03-02 RX ADMIN — HUMAN INSULIN 3 UNITS/HR: 100 INJECTION, SOLUTION SUBCUTANEOUS at 18:34

## 2018-03-02 RX ADMIN — MULTIVITAMIN 15 ML: LIQUID ORAL at 12:50

## 2018-03-02 RX ADMIN — POTASSIUM CHLORIDE 20 MEQ: 29.8 INJECTION, SOLUTION INTRAVENOUS at 04:25

## 2018-03-02 RX ADMIN — PIPERACILLIN AND TAZOBACTAM 3.38 G: 3; .375 INJECTION, POWDER, LYOPHILIZED, FOR SOLUTION INTRAVENOUS; PARENTERAL at 21:52

## 2018-03-02 RX ADMIN — HUMAN INSULIN 4 UNITS/HR: 100 INJECTION, SOLUTION SUBCUTANEOUS at 06:33

## 2018-03-02 RX ADMIN — METHYLPREDNISOLONE SODIUM SUCCINATE 125 MG: 125 INJECTION, POWDER, LYOPHILIZED, FOR SOLUTION INTRAMUSCULAR; INTRAVENOUS at 10:33

## 2018-03-02 RX ADMIN — ALBUMIN HUMAN 12.5 G: 0.05 INJECTION, SOLUTION INTRAVENOUS at 21:15

## 2018-03-02 RX ADMIN — MYCOPHENOLATE MOFETIL 1500 MG: 200 POWDER, FOR SUSPENSION ORAL at 09:12

## 2018-03-02 RX ADMIN — NYSTATIN 1000000 UNITS: 500000 SUSPENSION ORAL at 15:58

## 2018-03-02 RX ADMIN — PIPERACILLIN AND TAZOBACTAM 3.38 G: 3; .375 INJECTION, POWDER, LYOPHILIZED, FOR SOLUTION INTRAVENOUS; PARENTERAL at 16:32

## 2018-03-02 RX ADMIN — PROPOFOL 10 MCG/KG/MIN: 10 INJECTION, EMULSION INTRAVENOUS at 11:23

## 2018-03-02 RX ADMIN — HEPARIN SODIUM 13.36 UNITS/KG/HR: 10000 INJECTION, SOLUTION INTRAVENOUS at 04:25

## 2018-03-02 RX ADMIN — EPOPROSTENOL 10 NG/KG/MIN: 1.5 INJECTION, POWDER, LYOPHILIZED, FOR SOLUTION INTRAVENOUS at 11:04

## 2018-03-02 RX ADMIN — MINERAL OIL AND WHITE PETROLATUM: 150; 830 OINTMENT OPHTHALMIC at 21:52

## 2018-03-02 RX ADMIN — ALBUMIN HUMAN 12.5 G: 0.05 INJECTION, SOLUTION INTRAVENOUS at 00:00

## 2018-03-02 RX ADMIN — ALBUMIN HUMAN 12.5 G: 50 SOLUTION INTRAVENOUS at 20:33

## 2018-03-02 RX ADMIN — NYSTATIN 1000000 UNITS: 500000 SUSPENSION ORAL at 09:11

## 2018-03-02 RX ADMIN — TACROLIMUS 0.6 MCG/KG/HR: 5 INJECTION, SOLUTION INTRAVENOUS at 22:05

## 2018-03-02 RX ADMIN — PIPERACILLIN AND TAZOBACTAM 3.38 G: 3; .375 INJECTION, POWDER, LYOPHILIZED, FOR SOLUTION INTRAVENOUS; PARENTERAL at 04:14

## 2018-03-02 RX ADMIN — ACETAMINOPHEN 975 MG: 325 TABLET, FILM COATED ORAL at 20:33

## 2018-03-02 NOTE — PROGRESS NOTES
Cardiothoracic surgery ECMO progress note     ECMO Day: 2.     Type of ECMO: VA..     Indication:   1.  Dermatomyositis with ILD and end-stage lung disease.  2.  Right heart failure and cardiogenic shock.     Cannulation:  Right subclavian arterial (8 mm Gelweave chimney graft).  Right femoral venous.     Cannulation issues: None.     Goal ACT: 160-180.     RPM: 3100.  Flow: 3.2 L/min.  Sweep: 2 LPM.  FiO2: 1.0.     Issues/concerns: Status post bilateral sequential lung transplant today.     Blood products: Multiple units of plasma, platelets PRBC and cell saver during transplant.    Current coagulation status: Heparinized with goal -180.  Current fluid status: Intravascular euvolemia.       Plan: Leave on VA ECMO today and tomorrow. Turn down in OR on Saturday or Sunday with OMA. If right heart function recovered and oxygenation adequate, will decannulate at that time and switch to mechanical ventilation. After removing venous cannula, will float Madison Supriya catheter through left IJ. If unable to wean off ECMO and decannulate, I will place a right IJ venous cannula and switch venous drainage to that and remove the right femoral venous cannula to facilitate mobilization.     Toby Hernandez MD PhD.

## 2018-03-02 NOTE — PHARMACY-VANCOMYCIN DOSING SERVICE
Pharmacy Vancomycin Note  Date of Service 2018  Patient's  1960   57 year old, female    Indication: Surgical Prophylaxis  Goal Trough Level: 15-20 mg/L  Day of Therapy: 4  Current Vancomycin regimen:  1000 mg IV q24h    Current estimated CrCl = Estimated Creatinine Clearance: 39.1 mL/min (based on Cr of 1.43).    Creatinine for last 3 days  2018: 11:32 PM Creatinine 1.10 mg/dL  2018:  3:57 AM Creatinine 1.26 mg/dL;  9:53 AM Creatinine 1.23 mg/dL;  4:39 PM Creatinine 1.28 mg/dL; 10:02 PM Creatinine 1.26 mg/dL  3/1/2018:  3:56 AM Creatinine 1.25 mg/dL;  9:26 AM Creatinine 1.30 mg/dL;  8:17 PM Creatinine 0.95 mg/dL  3/2/2018:  3:30 AM Creatinine 1.23 mg/dL; 10:21 AM Creatinine 1.42 mg/dL;  2:50 PM Creatinine 1.43 mg/dL    Recent Vancomycin Levels (past 3 days)  3/2/2018: 12:55 PM Vancomycin Level 12.2 mg/L    Vancomycin IV Administrations (past 72 hours)                   vancomycin (VANCOCIN) 1000 mg in dextrose 5% 200 mL PREMIX (mg) 1,000 mg New Bag 18 1522    vancomycin (VANCOCIN) 1000 mg in dextrose 5% 200 mL PREMIX (g) 1 g Given 18 1441     1,000 mg New Bag 18 2103                Nephrotoxins and other renal medications (Future)    Start     Dose/Rate Route Frequency Ordered Stop    18 1400  vancomycin (VANCOCIN) 1000 mg in dextrose 5% 200 mL PREMIX      1,000 mg  200 mL/hr over 1 Hours Intravenous EVERY 24 HOURS 18 1359    18 2200  piperacillin-tazobactam (ZOSYN) 3.375 g vial to attach to  mL bag      3.375 g  over 30 Minutes Intravenous EVERY 6 HOURS 18 21518  tacrolimus (PROGRAF) 5,000 mcg in D5W 250 mL      1 mcg/kg/hr × 51.3 kg (Dosing Weight)  2.6 mL/hr  Intravenous CONTINUOUS 18 1330  norepinephrine (LEVOPHED) 16 mg in D5W 250 mL infusion      0.03-0.4 mcg/kg/min × 51.3 kg (Dosing Weight)  1.4-19.2 mL/hr  Intravenous CONTINUOUS 18 1315      18 1330   vasopressin (PITRESSIN) 40 Units in D5W 40 mL infusion      0.5-4 Units/hr  0.5-4 mL/hr  Intravenous CONTINUOUS 03/01/18 1315               Contrast Orders - past 72 hours     None          Interpretation of levels and current regimen:  Trough level is slightly Subtherapeutic; however expect renal function to worsen post-op    Has serum creatinine changed > 50% in last 72 hours: No, but increased    Urine output:  diminished urine output    Renal Function: Worsening    Plan:  1.  Continue Current Dose  2.  Pharmacy will check trough levels as appropriate in 1-3 Days.    3. Serum creatinine levels will be ordered daily for the first week of therapy and at least twice weekly for subsequent weeks.      Annita Chin, PharmD  Pager [phone]      .

## 2018-03-02 NOTE — PROGRESS NOTES
CVTS PROGRESS NOTE  March 2, 2018      ASSESSMENT: Kecia Blue is a 57 year old female with PMH significant for dermatomyositis on immunosuppression, eronegative RA, ILD, and pulmonary hypertension, who was admitted for emergent lung transplant work-up on 2/21 for increasing shortness of breath and hypoxia now POD # 2 s/p emergent VA ECMO placement on 2/27/18. Now s/p bilateral lung transplant on 3/1/2018 with Dr. Hernandez.    TODAY'S PROGRESS/PLAN  - Wean flolan  - Abd x-ray for FT placement  - Start trickle if post-pyloric  - BMP q6h, monitor Cr and lytes  - Q2hr ABG  - Continue on ecmo, ACT goals 160-180  - Plan for turndown in OR 3/3 with possible decannulation    PLAN:  Neuro/ pain/ sedation:  - Monitor neurological status. Notify the MD for any acute changes in exam.  - Fentanyl gtt for pain.  - Propofol gtt for sedation.    Pulmonary care:  # Acute on chronic respiratory failure, worsening, s/p bilateral lung transplantation 3/1/2018  # ILD related to dermatomyositis, on transplant list  # Severe pulmonary hypertension (WHO class III)  # Pneumomediastinum, 2/22/2018   - Supplemental oxygen via VA ECMO and ventilator to keep saturation above 92 %.  - Flolan 20mg/kg/min, wean as able, monitor CVPs  - Nitric oxide weaned off 3/1  - VA ECMO, q2h ABG    Cardiovascular:  # Right heart failure  # Pulmonary HTN    - Monitor hemodynamic status.   - Most likely 2/2 pulmonary hypertension from ILD, but did not rule out PE - unlikely given hypotension occurred after intubation and did not occur at the same time as her worsening hypoxia.     GI care:   - NPO except ice chips and medications.  - NJ placed, Abd film, if FT post-pyloric will start trickle feeds    Fluids/ Electrolytes/ Nutrition:   - Periodic fluid bolus for IV fluid hydration  - Parenteral nutrition to start with placement of NG/NJ tube.    Renal/ Fluid Balance:  #RADHA-resolved    - Urine output is adequate so far.  - Cr trending up post-op, will  continue to monitor, q6h BMP  - Will continue to monitor intake and output.    Endocrine:    - Insulin gtt     ID/ Antibiotics:  - Anti-rejection and immunosuppressant medications per transplant team    Heme:     - Hemoglobin stable, trending down, Chest tube output has decreased will transfuse for Hgb less than 7.   - q2h Hgb monitoring with ECMO protocol    Prophylaxis:    - Mechanical prophylaxis for DVT.   - Famotidine BID    Lines/ tubes/ drains:  - VA-ECMO right subclavian, right femoral to move to RIJ  - Move RIJ to LIJ, move arterial line from left femoral to left radial/axillary artery    Disposition:  - CVICU.   - Plan for OR 3/3 for turndown and possible decannulation    Damien Muñoz PA-C  Cardiothoracic Surgery  March 2, 2018 8:37 AM   p: [phone]    ====================================    TODAY'S PROGRESS:   SUBJECTIVE:   - Lung transplant overnight, received 4 PRBCs, 3 FFP, one platelet s/p OR  - High chest tube output overnight, decreased this am to more appropriate level.    OBJECTIVE:   1. VITAL SIGNS:   Temp:  [96.1  F (35.6  C)-98.1  F (36.7  C)] 98.1  F (36.7  C)  Heart Rate:  [] 95  Resp:  [12] 12  MAP:  [48 mmHg-105 mmHg] 71 mmHg  Arterial Line BP: ()/(46-96) 75/70  FiO2 (%):  [40 %-80 %] 40 %  SpO2:  [98 %-100 %] 100 %  Ventilation Mode: CMV/AC  (Continuous Mandatory Ventilation/ Assist Control)  FiO2 (%): 40 %  Rate Set (breaths/minute): 12 breaths/min  Tidal Volume Set (mL): 430 mL  PEEP (cm H2O): 6 cmH2O  Oxygen Concentration (%): 40 %  Resp: 12    2. INTAKE/ OUTPUT:   I/O last 3 completed shifts:  In: 9991.13 [I.V.:3945.13; Other:620; NG/GT:330]  Out: 4475 [Urine:1605; Emesis/NG output:300; Chest Tube:2570]    3. PHYSICAL EXAMINATION:   General: Sedated, NAD  Neuro: Sedated, moving mouth with oral cares, NAD  Resp: Intubated, diminished lung sounds bilaterally  CV: RRR, No noted m/r/g  Abdomen: Soft, Non-distended  Incisions: c/d/i  Extremities: warm and well perfused    4.  INVESTIGATIONS:   Arterial Blood Gases     Recent Labs  Lab 03/02/18  0802 03/02/18  0541 03/02/18 0330 03/02/18  0040   PH 7.34* 7.32* 7.25* 7.42   PCO2 41 38 49* 29*   PO2 95 142* 131* 207*   HCO3 22 20* 22 19*     Complete Blood Count     Recent Labs  Lab 03/02/18 0330 03/01/18 2017 03/01/18 1949 03/01/18 1915 03/01/18 1914 03/01/18  1526  03/01/18  0926   WBC 12.6* 11.3*  --   --  8.1  --   --   --  10.4   HGB 7.9* 9.6* 9.2* 7.5* 7.5*  < >  --   < > 7.8*   PLT 92* 172  --   --  122*  --  151  --  165   < > = values in this interval not displayed.  Basic Metabolic Panel    Recent Labs  Lab 03/02/18 0330 03/01/18 2017 03/01/18 1949 03/01/18 1915 03/01/18  0926 03/01/18  0356   * 150* 144 143  < > 144 143   POTASSIUM 3.8 3.4 3.1* 3.3*  < > 4.2 4.3   CHLORIDE 117* 119*  --   --   --  112* 110*   CO2 22 24  --   --   --  25 25   BUN 37* 30  --   --   --  38* 37*   CR 1.23* 0.95  --   --   --  1.30* 1.25*   * 232* 208* 206*  < > 118* 125*   < > = values in this interval not displayed.  Liver Function Tests    Recent Labs  Lab 03/02/18 0330 03/01/18  2337 03/01/18 2017 03/01/18 1914 03/01/18 0356 02/28/18 0357   AST  --   --  38  --   --  58*  --   --    ALT 38  --  65*  --   --  172*  --  361*   ALKPHOS  --   --  35*  --   --  37*  --   --    BILITOTAL  --   --  1.2  --   --  0.7  --   --    ALBUMIN 2.9*  --  1.7*  --   --  2.4*  --  2.4*   INR 1.51* 1.76* 1.86* 1.93*  < > 1.51*  < > 1.52*   < > = values in this interval not displayed.  Pancreatic Enzymes  No lab results found in last 7 days.  Coagulation Profile    Recent Labs  Lab 03/02/18  0330 03/01/18  2337 03/01/18 2017 03/01/18  1914 03/01/18  1526   INR 1.51* 1.76* 1.86* 1.93* 1.37*   PTT 76*  --  >240* >240* 100*     Lactate  Invalid input(s): LACTATE    5. RADIOLOGY:   Recent Results (from the past 24 hour(s))   XR Chest Port 1 View    Narrative    Chest radiograph  3/1/2018 8:33 PM    History:  Postop lung transplantation.      Comparison: Chest radiograph earlier today    Findings:   Portable supine AP chest radiograph. Endotracheal tube with the tip  projects 5.2 cm above gee. New biapical and bibasilar chest tubes  and mediastinal drainage tube. Stable position of gastric and feeding  tube, right IJ catheter, left IJ central venous catheter.  Cardiac silhouette is stable. Pulmonary vasculature is distinct. No  pneumothorax or pleural effusion. The visualized upper abdomen is  unremarkable. Surgical clips projects over right axilla region.  Subcutaneous emphysema along the right neck.      Impression    IMPRESSION:  New biapical and basilar chest tubes, and mediastinal drainage tube.  Otherwise stable supportive lines and tubes.    I have personally reviewed the examination and initial interpretation  and I agree with the findings.    TALI CARRANZA MD   XR Chest Port 1 View    Narrative    XR CHEST PORT 1 VW  3/2/2018 2:03 AM    History: Endotracheal tube placement    Comparison: Prior day    Findings:   Single portable AP view of the chest is obtained. Endotracheal tube is  in stable position. Right internal jugular central venous catheter tip  projects over the mid SVC. There is a right inferior approach ECMO  cannula with the tip projecting near the superior cavoatrial junction.  Postsurgical changes of a subclavian artery cutdown for ECMO access  projecting over the right shoulder/axilla. Gastric tube tip and  sidehole projecting over left upper quadrant. Feeding type tube tip  not included in the image.    The cardiomediastinal silhouette is not enlarged. Diffuse interstitial  fibrotic opacities are again noted. Mild increase in overlying  scattered patchy opacities. No suspected pleural effusion. No  pneumothorax. Right supraclavicular subcutaneous emphysema stable.      Impression    IMPRESSION:  1. Stable lines, tubes, and post surgical changes.  2. Stable diffuse interstitial opacities.  3. Increase in patchy  opacities which could represent atelectasis or  airspace consolidation.    I have personally reviewed the examination and initial interpretation  and I agree with the findings.    YONATAN HERNANDES MD       =========================================

## 2018-03-02 NOTE — ANESTHESIA POSTPROCEDURE EVALUATION
Patient: Kecia Blue    Procedure(s):  Median Sternotomy, Extracorporeal Membrane Oxygenator, Transplant En Bloc Lung Recipient - Wound Class: I-Clean    Diagnosis:non-specific interstitial pneumonia  Diagnosis Additional Information: No value filed.    Anesthesia Type:  General    Note:  Anesthesia Post Evaluation    Patient location during evaluation: ICU  Patient participation: Unable to evaluate secondary to administered sedation  Level of consciousness: obtunded/minimal responses  Pain management: unable to assess  Respiratory status: ETT and ventilator  Hydration status: acceptable  PONV: unable to assess       Comments: Transported to ICU on VA ECMO , inhaled flolan and pressors. Care of patient handed to ICU staff after detailed report        Last vitals:  Vitals:    03/01/18 0740 03/01/18 0800 03/01/18 1140   BP:      Pulse:      Resp:  28    Temp:      SpO2: 100%  100%         Electronically Signed By: Rajat Castillo MD  March 1, 2018  8:23 PM

## 2018-03-02 NOTE — PROGRESS NOTES
"SPIRITUAL HEALTH SERVICES  SPIRITUAL ASSESSMENT Progress Note (Palliative Focus)  [facility] ([place]) 4E    REFERRAL SOURCE: Palliative consult service follow up visit.    Visit with patient Kecia Blue's family in [facility]:  Ranjan, daughters Yudelka and Nasreen, sister Caro, grandchildren Ananth and Shirley.    Family are hopeful, supporting one another in the news that \"ECMO won't come off today\". They were helping Ananth make card and drawing for Kecia's room, and I assisted them in finding supplies. Ananth shared memories of time spent with Kecia in the past.    Family continues to make sense of this situation by remembering Kecia's strength (per Caro) and relying on one another.     Plan: I will follow for spiritual support.    Heather Gutierrez  Palliative   Pager [phone]  [facility] Inpatient Team Consult pager [phone] (M-F 8-4:30)  After-hours Answering Service [phone]   "

## 2018-03-02 NOTE — ANESTHESIA CARE TRANSFER NOTE
Patient: Kecia Blue    Procedure(s):  Median Sternotomy, Extracorporeal Membrane Oxygenator, Transplant En Bloc Lung Recipient - Wound Class: I-Clean    Diagnosis: non-specific interstitial pneumonia  Diagnosis Additional Information: No value filed.    Anesthesia Type:   General     Note:  Airway :ETT and Ventilator  Patient transferred to:ICU  Comments: Anesthesia Care Transfer Note    Patient: Kecia Blue    Transferred to:4E    Patient vital signs: stable/baselin    Airway: 7.5 ETT insitu Vent 80% 425/12/6    Fully monitored to ICU. Monitors placed. VSS. PIV, Central Lines, femoral/arterial helena patent. Pt sedated, gtts verified with RN. Report given and care transferred to RN.     Lynn Quan CRNA   3/1/2018  ICU Handoff: Call for PAUSE to initiate/utilize ICU HANDOFF, Identified Patient, Identified Responsible Provider, Reviewed the Pertinent Medical History, Discussed Surgical Course, Reviewed Intra-OP Anesthesia Management and Issues during Anesthesia, Set Expectations for Post Procedure Period and Allowed Opportunity for Questions and Acknowledgement of Understanding      Vitals: (Last set prior to Anesthesia Care Transfer)    CRNA VITALS  3/1/2018 1928 - 3/1/2018 2007      3/1/2018             Resp Rate (set): 10                Electronically Signed By: ADRIÁN Ponce CRNA  March 1, 2018  8:07 PM

## 2018-03-02 NOTE — PROGRESS NOTES
Chest tube output around 300 ml/hr. Heparin goals decreased to 160-180. Heparin is being weaned rapidly while maintaining ACT goals.

## 2018-03-02 NOTE — PROGRESS NOTES
Transplant Social Work Services Progress Note      Date of Initial Social Work Evaluation: 2/20/2018  Collaborated with: pulm team    Data: supportive visit with spouse and daughter Nasreen this a.m.  Tired but coping well.  Hopeful patient is on right track. Understanding there will be ups and downs in coming days, but feeling like these problems will be more manageable than before transplant.    Intervention: supportive counseling.    Assessment: coping well.  Supporting each other effectively.    Education provided by SW: n/a  Plan:    Discharge Plans in Progress: none currently    Barriers to d/c plan: critical illness.    Follow up Plan: will continue to follow for support, counseling, education and resources.

## 2018-03-02 NOTE — PROGRESS NOTES
"CLINICAL NUTRITION SERVICES - brief note. See 2/28 note for full assessment.  **re-consulted post-op lung transplant (\"evaluate for nutrition support\") and to assess and order TF    -FT remains in place post-op, now post-pyloric per AXR. TF orders d/c.     Interventions  Collaboration with other providers - Discussed plan w/ team who requested to start only trophic TF today.  Enteral Nutrition - Re-ordered Nutren 1.5 @ 10 mL/hr + 2 pkts ProSource daily. **Note, eventual goal rate will be 40 mL/hr.   Feeding tube flush - 30 mL q4h for patency  Multivitamin/mineral supplement therapy - certavite per previous recommendations      RD will continue to follow.    Natalia Sargent, RD, LD, [facility]  CVICU Dietitian  Pager: [phone]    "

## 2018-03-02 NOTE — PROGRESS NOTES
ECMO Attending Progress Note  3/2/2018    Kecia Blue is a 57 year old female who was started on ECMO due to severe respiratory failure / right heart failure from pulmonary hypertension.   Interval events: POD 1 Bilateral lung transplant.  Days on ECMO: 1  ECMO configuration: VA ECMO - femoral venous drainage, right axillary arterial infusion  Anticoagulation: IV heparin  The patients vital signs today are as follows:    Temp:  [96.1  F (35.6  C)-98.1  F (36.7  C)] 97.7  F (36.5  C)  Heart Rate:  [] 88  Resp:  [12] 12  MAP:  [48 mmHg-105 mmHg] 83 mmHg  Arterial Line BP: ()/(46-96) 90/80  FiO2 (%):  [40 %-80 %] 40 %  SpO2:  [97 %-100 %] 97 %    Intake/Output Summary (Last 24 hours) at 03/02/18 1030  Last data filed at 03/02/18 1015   Gross per 24 hour   Intake         36722.03 ml   Output             4735 ml   Net          5549.03 ml    Ventilation Mode: CMV/AC  (Continuous Mandatory Ventilation/ Assist Control)  FiO2 (%): 40 %  Rate Set (breaths/minute): 12 breaths/min  Tidal Volume Set (mL): 430 mL  PEEP (cm H2O): 6 cmH2O  Oxygen Concentration (%): 40 %  Resp: 12   Recent Labs  Lab 03/02/18  1021 03/02/18  0802 03/02/18  0541 03/02/18  0330   PH 7.42 7.34* 7.32* 7.25*   PCO2 33* 41 38 49*   PO2 67* 95 142* 131*   HCO3 22 22 20* 22   O2PER 40 40.0 40 40      Recent Labs  Lab 03/02/18  0330 03/01/18 2017 03/01/18  1949 03/01/18  1915 03/01/18  1914 03/01/18  0926   WBC 12.6* 11.3*  --   --  8.1  --  10.4   HGB 7.9* 9.6* 9.2* 7.5* 7.5*  < > 7.8*   < > = values in this interval not displayed.  Creatinine   Date Value Ref Range Status   03/02/2018 1.23 (H) 0.52 - 1.04 mg/dL Final   03/01/2018 0.95 0.52 - 1.04 mg/dL Final   03/01/2018 1.30 (H) 0.52 - 1.04 mg/dL Final   03/01/2018 1.25 (H) 0.52 - 1.04 mg/dL Final     Physical Exam: Intubated, sedated, in bed.  Cannula unchanged.  Minimal oozing.  Minimal air movement  Extremities edematous    ECMO Issues including assessments and plan on DOS  3/2/2018:    Neuro: Sedated for mechanical ventilation and ECMO.  NIRS 60  RASS goal: 3-4  CV: Hemodynamically stable. CVP 10. Negative pressure -87 to -100mmHg on circuit.    Pulm: Severe respiratory failure requiring ECMO and mechanical ventilation. Flows unchanged at 3L   Keep vent settings at rest settings: PC 25, PEEP10, FiO2 60%.  FEN/Renal: Creatinine  Lab Results   Component Value Date    CR 1.23 03/02/2018   , stable  UOP 1700 / 24hrs  Heme: Hemoglobin 8.0 .  Goals: if O2 sat >85% Hgb may drift to 7-8.  If O2 Sat <85% keep Hgb 10-12.  ID:      All pertinent labs, imaging studies, physical exam and medications have been reviewed by me.     This patient requires continued ICU monitoring and cares.  I apprecitate the input of the SICU team for these issues.  I have discussed patient care and treatment plan with the SICU team and the patient's family.     ECMO [id number]    Jacinto Ackerman MD  CVTS  [phone]

## 2018-03-02 NOTE — BRIEF OP NOTE
[facility] Brief Operative Note    Pre-operative diagnosis: Non-specific interstitial pneumonia, respiratory failure.    Post-operative diagnosis Same    Procedure: Median sternotomy   ECMO  Back table preparation of allograft  Bilateral single lung transplant     Surgeon: David    Assistants(s): Zeus Camp, Caryn Marvin.    Estimated blood loss: 1000 ml    Specimens: Bilateral lungs.     Findings: See op note     o

## 2018-03-02 NOTE — PROGRESS NOTES
[facility]  WO Nurse Inpatient Adult Pressure Injury Prevention Assessment: ECMO,  [facility] Nurse Inpatient Pressure Injury Assessment     Focused Re-Assessment:    Cannulation:  Right subclavian arterial (8 mm Gelweave chimney graft).  Right femoral venous.    Pressure Injury Present: Yes, bridge of nose (see below) Stage 2  Positioning Tolerance: Poor  Expected Duration of ECMO: unknown     Pressure Injury Prevention Interventions In Place:        Z flow Positioner      TAPs Wedge Positioner      Prevalon Heel Lift Boots      Mepilex Sacral Dressing        Support Surface:  Low air loss mattress Isolibrium         Patient History: Per MD Note:56 yo lady with acute on chronic hypoxic respiratory failure from ILD currently awaiting transplant  Current Diet/Nutrition:   Active Diet Order      NPO for Medical/Clinical Reasons Except for: No Exceptions    Marcus: Marcus Score  Av.5  Min: 11  Max: 20     Labs:   Recent Labs   Lab Test  18   0953  18   0357  18   2332   ALBUMIN   --   2.4*   --    HGB  8.7*  9.6*  11.0*   INR  1.58*  1.52*   --    WBC  15.2*  17.4*  19.4*   A1C   --    --   5.3       Pressure Injury: on bridge of nose  hospital acquired   This is a Medical Device Related Pressure Injury (MDRPI) due to BiPAP mask  Pressure Injury is Stage 2   Status: healing     Physical Exam  Wound Location:  [facility]       Date of last Photo 18  Wound History: pt with BiPAP mask prior to ECMO cannulation.  Wound noted when mask removed   Measurements (length x width x depth, in cm) 0.6 cm x 0.4 cm  x  0.1 cm   Wound Base:  100 % thin red fibrinous scab   Palpation of the wound bed: normal   Periwound skin: intact  Color: normal and consistent with surrounding tissue  Temperature: normal   Drainage:, none  Pain: no grimacing or signs of discomfort,     Plan of Care for: nose  Pressure source removed, no intervention necessary unless BiPAP resumed     Positioning and Pressure  Injury Prevention:   Reposition patient and head every 1-2 hours using Z flow and Prevalon Wedges   Pad ECMO groin and chest cannula under rigid connectors with Optifoam or  Soft Cloth   Heel Lift Boots at all times   Sacral Mepilex for Prevention    When IJ cannulated, Pad ECMO IJ cannula with Optifoam (#[id number]) along face and scalp between skin and cannula and under Coban head wraps     WOC to follow up: twice weekly     Face to Face Time: 5 minutes

## 2018-03-02 NOTE — PROGRESS NOTES
Per Dr Ackerman, the ACT goal will be raised to 200-220 with the low flow ECMO settings of one liter. Continue to follow.

## 2018-03-02 NOTE — PROGRESS NOTES
CVICU PROGRESS NOTE  March 2, 2018      ASSESSMENT: Kecia Blue is a 57 year old female with PMH significant for dermatomyositis on immunosuppression, eronegative RA, ILD, and pulmonary hypertension, who was admitted for emergent lung transplant work-up on 2/21 for increasing shortness of breath and hypoxia now POD # 2 s/p emergent [facility] ECMO placement on 2/27/18. Now s/p bilateral lung transplant on 3/1/2018 with Dr. Hernandez.    TODAY'S PROGRESS/PLAN  - Wean flolan  - Abd x-ray for FT placement  - Start trickle if post-pyloric  - BMP q6h, monitor Cr and lytes  - Q2hr ABG  - Continue on ecmo, ACT goals 160-180  - Plan for turndown in OR 3/3 with possible decannulation    PLAN:  Neuro/ pain/ sedation:  - Monitor neurological status. Notify the MD for any acute changes in exam.  - Fentanyl gtt for pain.  - Propofol gtt for sedation.    Pulmonary care:  # Acute on chronic respiratory failure, worsening, s/p bilateral lung transplantation 3/1/2018  # ILD related to dermatomyositis, on transplant list  # Severe pulmonary hypertension (WHO class III)  # Pneumomediastinum, 2/22/2018   - Supplemental oxygen via [facility] ECMO and ventilator to keep saturation above 92 %.  - Flolan 20mg/kg/min, wean as able, monitor CVPs  - Nitric oxide weaned off 3/1  - VA ECMO, q2h ABG    Cardiovascular:  # Right heart failure  # Pulmonary HTN    - Monitor hemodynamic status.   - Most likely 2/2 pulmonary hypertension from ILD, but did not rule out PE - unlikely given hypotension occurred after intubation and did not occur at the same time as her worsening hypoxia.     GI care:   - NPO except ice chips and medications.  - NJ placed, Abd film, if FT post-pyloric will start trickle feeds    Fluids/ Electrolytes/ Nutrition:   - Periodic fluid bolus for IV fluid hydration  - Parenteral nutrition to start with placement of NG/NJ tube.    Renal/ Fluid Balance:  #RADHA-resolved    - Urine output is adequate so far.  - Cr trending up post-op, will  continue to monitor, q6h BMP  - Will continue to monitor intake and output.    Endocrine:    - Insulin gtt     ID/ Antibiotics:  - Anti-rejection and immunosuppressant medications per transplant team    Heme:     - Hemoglobin stable, trending down, Chest tube output has decreased will transfuse for Hgb less than 7.   - q2h Hgb monitoring with ECMO protocol    Prophylaxis:    - Mechanical prophylaxis for DVT.   - Famotidine BID    Lines/ tubes/ drains:  - VA-ECMO right subclavian, right femoral to move to RIJ  - Move RIJ to LIJ, move arterial line from left femoral to left radial/axillary artery    Disposition:  - CVICU.   - Plan for OR 3/3 for turndown and possible decannulation    D/w CVICU attending, Dr. Aguilar.    Damien Muñoz PA-C  Cardiothoracic Surgery  March 2, 2018 8:37 AM   p: [phone]    ====================================    TODAY'S PROGRESS:   SUBJECTIVE:   - Lung transplant overnight, received 4 PRBCs, 3 FFP, one platelet s/p OR  - High chest tube output overnight, decreased this am to more appropriate level.    OBJECTIVE:   1. VITAL SIGNS:   Temp:  [96.1  F (35.6  C)-98.1  F (36.7  C)] 98.1  F (36.7  C)  Heart Rate:  [] 95  Resp:  [12] 12  MAP:  [48 mmHg-105 mmHg] 71 mmHg  Arterial Line BP: ()/(46-96) 75/70  FiO2 (%):  [40 %-80 %] 40 %  SpO2:  [98 %-100 %] 100 %  Ventilation Mode: CMV/AC  (Continuous Mandatory Ventilation/ Assist Control)  FiO2 (%): 40 %  Rate Set (breaths/minute): 12 breaths/min  Tidal Volume Set (mL): 430 mL  PEEP (cm H2O): 6 cmH2O  Oxygen Concentration (%): 40 %  Resp: 12    2. INTAKE/ OUTPUT:   I/O last 3 completed shifts:  In: 9991.13 [I.V.:3945.13; Other:620; NG/GT:330]  Out: 4475 [Urine:1605; Emesis/NG output:300; Chest Tube:2570]    3. PHYSICAL EXAMINATION:   General: Sedated, NAD  Neuro: Sedated, moving mouth with oral cares, NAD  Resp: Intubated, diminished lung sounds bilaterally  CV: RRR, No noted m/r/g  Abdomen: Soft, Non-distended  Incisions:  c/d/i  Extremities: warm and well perfused    4. INVESTIGATIONS:   Arterial Blood Gases     Recent Labs  Lab 03/02/18  0802 03/02/18  0541 03/02/18 0330 03/02/18  0040   PH 7.34* 7.32* 7.25* 7.42   PCO2 41 38 49* 29*   PO2 95 142* 131* 207*   HCO3 22 20* 22 19*     Complete Blood Count     Recent Labs  Lab 03/02/18 0330 03/01/18 2017 03/01/18 1949 03/01/18 1915 03/01/18 1914 03/01/18  1526  03/01/18  0926   WBC 12.6* 11.3*  --   --  8.1  --   --   --  10.4   HGB 7.9* 9.6* 9.2* 7.5* 7.5*  < >  --   < > 7.8*   PLT 92* 172  --   --  122*  --  151  --  165   < > = values in this interval not displayed.  Basic Metabolic Panel    Recent Labs  Lab 03/02/18 0330 03/01/18 2017 03/01/18 1949 03/01/18 1915 03/01/18  0926 03/01/18  0356   * 150* 144 143  < > 144 143   POTASSIUM 3.8 3.4 3.1* 3.3*  < > 4.2 4.3   CHLORIDE 117* 119*  --   --   --  112* 110*   CO2 22 24  --   --   --  25 25   BUN 37* 30  --   --   --  38* 37*   CR 1.23* 0.95  --   --   --  1.30* 1.25*   * 232* 208* 206*  < > 118* 125*   < > = values in this interval not displayed.  Liver Function Tests    Recent Labs  Lab 03/02/18 0330 03/01/18 2337 03/01/18 2017 03/01/18 1914 03/01/18 0356 02/28/18 0357   AST  --   --  38  --   --  58*  --   --    ALT 38  --  65*  --   --  172*  --  361*   ALKPHOS  --   --  35*  --   --  37*  --   --    BILITOTAL  --   --  1.2  --   --  0.7  --   --    ALBUMIN 2.9*  --  1.7*  --   --  2.4*  --  2.4*   INR 1.51* 1.76* 1.86* 1.93*  < > 1.51*  < > 1.52*   < > = values in this interval not displayed.  Pancreatic Enzymes  No lab results found in last 7 days.  Coagulation Profile    Recent Labs  Lab 03/02/18  0330 03/01/18  2337 03/01/18 2017 03/01/18  1914 03/01/18  1526   INR 1.51* 1.76* 1.86* 1.93* 1.37*   PTT 76*  --  >240* >240* 100*     Lactate  Invalid input(s): LACTATE    5. RADIOLOGY:   Recent Results (from the past 24 hour(s))   XR Chest Port 1 View    Narrative    Chest radiograph  3/1/2018  8:33 PM    History:  Postop lung transplantation.     Comparison: Chest radiograph earlier today    Findings:   Portable supine AP chest radiograph. Endotracheal tube with the tip  projects 5.2 cm above gee. New biapical and bibasilar chest tubes  and mediastinal drainage tube. Stable position of gastric and feeding  tube, right IJ catheter, left IJ central venous catheter.  Cardiac silhouette is stable. Pulmonary vasculature is distinct. No  pneumothorax or pleural effusion. The visualized upper abdomen is  unremarkable. Surgical clips projects over right axilla region.  Subcutaneous emphysema along the right neck.      Impression    IMPRESSION:  New biapical and basilar chest tubes, and mediastinal drainage tube.  Otherwise stable supportive lines and tubes.    I have personally reviewed the examination and initial interpretation  and I agree with the findings.    TALI CARRANZA MD   XR Chest Port 1 View    Narrative    XR CHEST PORT 1 VW  3/2/2018 2:03 AM    History: Endotracheal tube placement    Comparison: Prior day    Findings:   Single portable AP view of the chest is obtained. Endotracheal tube is  in stable position. Right internal jugular central venous catheter tip  projects over the mid SVC. There is a right inferior approach ECMO  cannula with the tip projecting near the superior cavoatrial junction.  Postsurgical changes of a subclavian artery cutdown for ECMO access  projecting over the right shoulder/axilla. Gastric tube tip and  sidehole projecting over left upper quadrant. Feeding type tube tip  not included in the image.    The cardiomediastinal silhouette is not enlarged. Diffuse interstitial  fibrotic opacities are again noted. Mild increase in overlying  scattered patchy opacities. No suspected pleural effusion. No  pneumothorax. Right supraclavicular subcutaneous emphysema stable.      Impression    IMPRESSION:  1. Stable lines, tubes, and post surgical changes.  2. Stable diffuse  interstitial opacities.  3. Increase in patchy opacities which could represent atelectasis or  airspace consolidation.    I have personally reviewed the examination and initial interpretation  and I agree with the findings.    YONATAN HERNANDES MD       =========================================

## 2018-03-02 NOTE — PROGRESS NOTES
Pulmonary Medicine  Cystic Fibrosis - Lung Transplant Daily Progress Note   March 2, 2018    Patient: Kecia Blue  MRN: [id number]  Admission date: 2/21/2018  Hospital Day # 9          Assessment and Plan:   Kecia Blue is a 58 y/o F w/PMHx of dermatomyositis (on immunosuppression), seronegative RA, ILD, Pulm HTN, who was admitted for emergent lung transplant on 2/21/18 for increased SOB and hypoxia. On 2/27, she was placed on V-A ECMO for R heart failure. She is s/p bilateral lung transplant on 3/1/18. Currently maintained on VA-ECMO. She was on three pressors after surgery, now on two, and was also maintained on flolan.     TODAY'S PROGRESS/PLAN:  - STAT tacro level drawn from periphery at 5pm to confirm elevated tacro level (19.9--after six hours of initiation). Please decrease tacrolimus to 0.6 mcg/kg/hr.  - Wean flolan as tolerated   - Abd x-ray for FT placement  - Start trickle feeds if her FT is post-pyloric  - BMP q6h, monitor Cr and lytes for RADHA  - Q2hr ABG  - Continue on ECMO, ACT goals 160-180  - Plan for turndown in OR 3/3 with possible decannulation  - Obtain tacro level in AM      ##S/P bilateral lung transplant on 3/1/18, for acute hypoxic/hypercapneic respiratory failure   ##S/P Intubation  ##S/P VA-ECMO  Patient is currently on two pressors (previously on NE; at this time, she is on epi and vasopressin), oxygenating well.    --Plan is to wean as tolerated from VA-ECMO, flolan, and pressors as CVICU and CVTS.   --Aggressive pulmonary toilet with chest physiotherapy QID (acapella/incentive spirometry once extubated)   --Ventilator management per ICU team.  Our team will perform bronchoscopy on day of anticipated extubation or on Monday.   --Anesthesia to place paravertebral blocks prior to anticipated extubation per our routine   --Five Chest tubes managed by surgical team, no air leak present   --Await explant pathology     Continue 3-drug immunosuppression:  NOTE: pt's home dose of  tacrolimus was equivalent to 2mcg/kg/hr.  - cont tacrolimus IV infusion at standard 1 mcg/kg/hr (51.3 mcg/hr) but her 6hr level was 19.8. Hence stat tac level obtained and decreased gtts to 0.65mcg/kg/hr.  Goal tacrolimus level 10-15.  Will check daily tacrolimus levels while on IV infusion and make dose adjustments daily based on levels and changes in medications/patient condition.  - cont Mycophenolate 1500 mg BID  - Methylprednisolone 125 mg x 3 doses, then taper per lung transplant protocol. Then begin then prednisone 12 mg BID with taper per lung transplant protocol.     Prophylaxis:  mg Bactrim for PJP prophylaxis.  Valganciclovir to start POD#8 for CMV prophylaxis.  See below for patient/donor serologies.    Donor Recipient Intervention   CMV status  Pos   Pos  Valgancyclovir POD#8   EBV status  Neg Pos Immune    HSV status Neg  Pos Immune      Primary graft dysfunction:  See chart below:    POD#1   (0-24 hours) POD#2   (25-48 hours) POD#3   (49-72 hours)   Date  3/2/18       Time  1232       Intubated? Y Y/N Y/N   PaO2 330        FiO2 60        P/F Ratio 550        PGD Grade   (0=mild, 3=severe) 0       ECMO? Y Y/N Y/N   Inhaled NO? N Y/N Y/N   ISHLT PGD Scoring  Grade 0 - PaO2/FiO2 >300 and normal chest radiograph (must be extubated to be Grade 0)  Grade 1 - PaO2/FiO2 >300 and diffuse allograft infiltrates on chest radiograph  Grade 2 - PaO2/FiO2 between 200 and 300  Grade 3 - PaO2/FiO2 <200)     ###ID     Abx:   --cont vancomycin for three days while waiting for culture.   -- Will continue zosyn till cultures finalized (based on bronchial culture of donor done at OSH, see below) and adjust accordingly. Will give a total of 10 - 14day course.    Cultures:  Recipient:  Blood Cx x2: NGTD from 2/21, x2 NGTD from 2/28 and 3/1 and 3/2  Nasal Swab: influenza A & B and RSV PCR neg   Resp Virus Panel: pan neg  Sputum Gram Stain Endotracheal: from 3/1, > 25 PMNs per LPF, mixed gram pos and neg  bacteria  Endotracheal Sputum Cx: from 3/1, light growth of normal alo    Donor:   Lung Fluid, Bronchial Cx Aerobic Bact: from 3/1, light growth S aureus  Lung Fluid, Bronchial Gram Stain: from 3/1, < 25 PMNs, no organisms  Lung Fluid, Bronchial Fungal Cx: from 3/1, pending  Lung Fluid, AFB Cx x2: from 3/1, pending    Bronchial culture from Donor at OSH:   1) Light growth S aureus  2) Light growth Strep Group G  3) Scant growth S pneumoniae  4) Scant growth Haemophilus  5) + moraxella catarrhalis  6) + beta lactamase positive (rsistant to amox/amp)    The above organisms were also present in bronchial washing.    Bronchial washing gram stain: mod WBCs, rare gram + cocci    Other hospital problems managed by primary team:     Drips:   Fentanyl-cont per ICU team  Heparin-cont per ICU team  Insulin-cont per ICU team  Propofol-per ICU team  Tacrolimus    Cardiovascular:  ##Right heart failure  ##Pulmonary HTN    Pt is s/p VA-ECMO and on flolan.  Previously on three pressors, now on two.    --Monitor hemodynamics, wean pressors as tolerated   --RHF most likely 2/2 pulm HTN from ILD, but PE has not been r/o. She was hypotensive post-intubation, not prior.      GI care:   No active GI issues.    ##Fluids/ Electrolytes/ Nutrition:     --cont periodic fluid bolus for IV fluid hydration   --NPO except ice chips and medications.   --NJ placed, Abd film, if FT post-pyloric will start trickle feeds       Renal/ Fluid Balance:  #RADHA  Baseline Cr 0.93. Possibly pre-renal 2/2 hypotension, although MAPs > 60 while on pressors. UOP 20 cc/hr O/N. 30 cc/hr is ideal.   --CTM with q6h BMP   --monitoring strict I's and O's     Endocrine:    No active Issues.    Cont Insulin gtt. Reassess need in AM per ICU team.       Heme:     ##Anemia  Likely 2/2 blood loss and dilutional. Baseline 14. Currently stable at 8.   --CTM, q2hr monitoring with standard ECMO protocol   --transfuse for hgb < 7     ##Leukocytosis  Likely 2/2 stress response  and steroids.    --CTM    ##Thrombocytopenia  Likely 2/2 procedures/bleeding.    --CTM    Rheum:  ##Dermatomyositis    ##Seroneg RA   --cont tacrolimus for transplant   --holding home azathioprine   --holding home prednisone (5mg daily) given she is on steroids       Prophylaxis:    DVT: mechanical, Mechanical prophylaxis for DVT.   GI: cont Famotidine BID     Lines/ tubes/ drains:  - VA-ECMO right subclavian, right femoral to move to RIJ  - Move RIJ to LIJ, move arterial line from left femoral to left radial/axillary artery     Disposition:  - CVICU.   - Plan for OR 3/3 for turndown and possible decannulation  We appreciate the excellent care provided by the ICU team. We will continue to follow along closely, please do not hesitate to call with any questions or concerns.       FEN: NPO, trickle TF to be initiated  LINES: VA-ECMO right subclavian, right femoral to move to RIJ; Move RIJ to LIJ, move arterial line from left femoral to left radial/axillary artery  PROPHY: mech vent and famotidine BID; also hep drip  DISPO: CVICU    Alessandra Lombardi MD  [facility]  Pulmonary Medicine  Pager [phone]  Please see Epic sticky note or Amcom for team contact information  After 6pm weekdays, or all day on weekends: pager [phone]       Subjective & Interval History:     Pt is sedated. S/P bilateral lung transplant on 3/1/18.     Last 24 hours of care team notes reviewed.             Review of Systems:     Unable to obtain, given pt is sedated.           Medications:     Active Medications:    lidocaine (viscous)  5 mL Topical Once     multivitamins with minerals  15 mL Per Feeding Tube Daily     protein modular  1 packet Per Feeding Tube BID     vancomycin (VANCOCIN) IV  1,000 mg Intravenous Q24H     piperacillin-tazobactam  3.375 g Intravenous Q6H     nystatin  1,000,000 Units Swish & Swallow 4x Daily     sulfamethoxazole-trimethoprim  10 mL Oral or NG Tube Daily    Or     sulfamethoxazole-trimethoprim  1 tablet  Oral or NG Tube Daily     pantoprazole (PROTONIX) IV  40 mg Intravenous Daily     mycophenolate  1,500 mg Oral BID IS    Or     mycophenolate  1,500 mg Oral or NG Tube BID IS     methylPREDNISolone  125 mg Intravenous Q8H    Followed by     [START ON 3/3/2018] prednisoLONE  0.25 mg/kg (Dosing Weight) Oral or NG Tube BID     acetaminophen  975 mg Oral or Feeding Tube Q8H     docusate  100 mg Oral or Feeding Tube BID     artificial tears   Both Eyes Q6H       Active PRN Medications:  IV fluid REPLACEMENT ONLY, Reason beta blocker order not selected, naloxone, [START ON 3/4/2018] acetaminophen, oxyCODONE IR, ondansetron **OR** ondansetron, IV fluid REPLACEMENT ONLY, glucose **OR** dextrose **OR** glucagon, heparin, albuterol, propofol (DIPRIVAN) infusion **AND** propofol **AND** CK total **AND** Triglycerides, potassium chloride, potassium chloride, potassium chloride, potassium chloride with lidocaine, potassium chloride, magnesium sulfate, magnesium sulfate         Physical Exam:     Constitutional: sedated, in no apparent distress.   HEENT: Intubated  PULM: Coarse breath sounds on anterior chest. Pt is breathing synchronously on vent.   CV: Normal S1 and S2. RRR.  No murmur, gallop, or rub.  No peripheral edema.  Peripheral pulses weak but obtained in all four extremities via doppler.   ABD: NABS, soft, nontender, nondistended.  No guarding.   MSK: No apparent muscle wasting. Has cold extremities.  NEURO: Sedated; spontaneous movement noted when patient's position is changed and during oral cares  SKIN: Cold, dry. No rash on limited exam.   PSYCH: Sedated-unable to assess.    Lines, Drains, and Devices:  Peripheral IV 02/21/18 Left;Lateral Upper forearm (Active)   Site Assessment WDL 3/2/2018  8:00 AM   Line Status Infusing 3/2/2018  8:00 AM   Phlebitis Scale 0-->no symptoms 3/2/2018  8:00 AM   Infiltration Scale 0 3/2/2018  8:00 AM   Infiltration Site Treatment Method  None 2/24/2018  4:00 AM   Extravasation? No  3/2/2018  8:00 AM   IV Site Rotation Due Date 02/25/18 3/2/2018  4:00 AM   Reason Not Rotated Poor venous access 3/2/2018  4:00 AM   Number of days:9       Arterial Line 02/27/18 Femoral (Active)   Site Assessment WDL 3/2/2018  8:00 AM   Line Status Pulsatile blood flow 3/2/2018  8:00 AM   Art Line Waveform Appropriate;Square wave test performed 3/2/2018  8:00 AM   Art Line Interventions Leveled;Connections checked and tightened;Flushed per protocol 3/2/2018  8:00 AM   Color/Movement/Sensation Capillary refill greater than 3 sec 3/2/2018  8:00 AM   Dressing Type Transparent 3/2/2018  8:00 AM   Dressing Status Clean, dry, intact 3/2/2018  8:00 AM   Dressing Change Due 03/04/18 3/2/2018  4:00 AM   Number of days:3       Arterial Line 02/28/18 Radial (Active)   Site Assessment WDL 3/2/2018  8:00 AM   Line Status Pulsatile blood flow 3/2/2018  8:00 AM   Art Line Waveform Dampened 3/2/2018  8:00 AM   Art Line Interventions Leveled;Connections checked and tightened;Flushed per protocol 3/2/2018  8:00 AM   Color/Movement/Sensation Capillary refill greater than 3 sec 3/2/2018  8:00 AM   Dressing Type Transparent 3/2/2018  8:00 AM   Dressing Status Clean, dry, intact 3/2/2018  8:00 AM   Dressing Intervention Dressing changed/new dressing 2/28/2018  4:00 PM   Dressing Change Due 03/04/18 3/2/2018  4:00 AM   Number of days:2       CVC Double Lumen 03/01/18 (Active)   Site Assessment WDL 3/2/2018  8:00 AM   Extravasation? No 3/2/2018  8:00 AM   Dressing Intervention Chlorhexidine sponge;Transparent 3/2/2018  4:00 AM   Dressing Change Due 03/04/18 3/2/2018  4:00 AM   CVC Lumen Assessment White;Brown;Blue 3/2/2018  4:00 AM   Number of days:1       CVC Triple Lumen 02/27/18 Right Internal jugular (Active)   Site Assessment WDL 3/2/2018  8:00 AM   Extravasation? No 3/2/2018  8:00 AM   Dressing Intervention Chlorhexidine sponge;Transparent 3/2/2018  4:00 AM   Dressing Change Due 03/04/18 3/2/2018  4:00 AM   CVC Comment cdi 2/28/2018  " 4:00 AM   CVC Lumen Assessment White;Brown;Blue 3/2/2018  4:00 AM   Number of days:3       Arterial Sheath  (Active)   Specific Qualities Sutured 3/2/2018  8:00 AM   Site Assessment Bleeding 3/2/2018  8:00 AM   Dressing Type Transparent 3/2/2018  8:00 AM   Dressing Intervention Dressing reinforced 3/2/2018  4:00 AM   Arterial Sheath Comment art ecmo 3/2/2018  8:00 AM   Number of days:3       Venous Sheath (Active)   Specific Qualities Sutured 3/2/2018  8:00 AM   Site Assessment WDL 3/2/2018  8:00 AM   Dressing Type Transparent 3/2/2018  8:00 AM   Dressing Intervention Dressing reinforced 3/2/2018  4:00 AM   Venous Sheath Comment prachi ecmo 3/2/2018  8:00 AM   Number of days:3            Data:     All vital signs, laboratory and imaging data for the past 24 hours reviewed.      Vital signs:  Temp: 97.7  F (36.5  C) Temp src: Esophageal     Heart Rate: 87 Resp: 12 SpO2: 100 % O2 Device: Mechanical Ventilator Oxygen Delivery:  (45 LPM) Height: 160 cm (5' 3\") Weight: 55.7 kg (122 lb 12.7 oz)    Weight trend:   Vitals:    02/27/18 1200 03/01/18 0400 03/02/18 0400   Weight: 51.3 kg (113 lb 1.5 oz) 54.2 kg (119 lb 7.8 oz) 55.7 kg (122 lb 12.7 oz)        I/O:   Intake/Output Summary (Last 24 hours) at 03/02/18 1315  Last data filed at 03/02/18 1300   Gross per 24 hour   Intake         31956.58 ml   Output             4635 ml   Net          6436.58 ml       Labs    CMP:   Recent Labs  Lab 03/02/18  1021 03/02/18  0330 03/01/18 2017 03/01/18  1949  03/01/18  0926 03/01/18  0356 02/28/18  2202  02/28/18  0357   * 148* 150* 144  < > 144 143 141  < > 140   POTASSIUM 4.0 3.8 3.4 3.1*  < > 4.2 4.3 4.0  < > 4.1   CHLORIDE 119* 117* 119*  --   --  112* 110* 108  < > 106   CO2 22 22 24  --   --  25 25 24  < > 23   ANIONGAP 8 9 7  --   --  7 8 8  < > 11   * 156* 232* 208*  < > 118* 125* 157*  < > 142*   BUN 39* 37* 30  --   --  38* 37* 36*  < > 35*   CR 1.42* 1.23* 0.95  --   --  1.30* 1.25* 1.26*  < > 1.26* "   GFRESTIMATED 38* 45* 61  --   --  42* 44* 44*  < > 44*   GFRESTBLACK 46* 55* 74  --   --  51* 54* 53*  < > 53*   CHELSEY 6.9* 7.0* 6.8*  --   --  7.9* 7.8* 7.8*  < > 8.1*   MAG  --  2.2 1.9  --   --   --  2.9* 2.8*  < > 1.5*   PHOS  --  4.0 3.9  --   --   --  3.9 3.7  --  3.9   PROTTOTAL  --   --  3.7*  --   --   --  5.6*  --   --   --    ALBUMIN  --  2.9* 1.7*  --   --   --  2.4*  --   --  2.4*   BILITOTAL  --   --  1.2  --   --   --  0.7  --   --   --    ALKPHOS  --   --  35*  --   --   --  37*  --   --   --    AST  --   --  38  --   --   --  58*  --   --   --    ALT  --  38 65*  --   --   --  172*  --   --  361*   < > = values in this interval not displayed.  CBC:   Recent Labs  Lab 03/02/18  1021 03/02/18  0330 03/01/18 2017 03/01/18  1949  03/01/18  1914   WBC 8.8 12.6* 11.3*  --   --  8.1   RBC 2.53* 2.48* 3.05*  --   --  2.45*   HGB 8.0* 7.9* 9.6* 9.2*  < > 7.5*   HCT 22.4* 23.1* 29.3*  --   --  23.4*   MCV 89 93 96  --   --  96   MCH 31.6 31.9 31.5  --   --  30.6   MCHC 35.7 34.2 32.8  --   --  32.1   RDW 15.3* 15.3* 16.6*  --   --  16.2*   * 92* 172  --   --  122*   < > = values in this interval not displayed.  INR:   Recent Labs  Lab 03/02/18  1021 03/02/18  0330 03/01/18  2337 03/01/18 2017   INR 1.62* 1.51* 1.76* 1.86*     Glucose:   Recent Labs  Lab 03/02/18  1234 03/02/18  1122 03/02/18  1021 03/02/18  1018 03/02/18  0908 03/02/18  0801 03/02/18  0629  03/02/18  0330  03/01/18 2017 03/01/18  1949 03/01/18  1915 03/01/18  1850   GLC  --   --  116*  --   --   --   --   --  156*  --  232* 208* 206* 180*   * 101*  --  114* 101* 98 124*  < >  --   < >  --   --   --   --    < > = values in this interval not displayed.  Blood Gas:   Recent Labs  Lab 03/02/18  1232 03/02/18  1021 03/02/18  0802  03/01/18  0357  02/28/18  1639  02/28/18  0356  02/27/18  1619   PHV  --   --   --   --   --   --   --   --   --   --  7.27*   PCO2V  --   --   --   --   --   --   --   --   --   --  61*   PO2V  --   --    --   --   --   --   --   --   --   --  34   HCO3V  --   --   --   --   --   --   --   --   --   --  28   LASHAUN  --   --   --   --  2.1  --  1.1  --  0.4  --   --    O2PER 60.0 40 40.0  < >  --   < >  --   < >  --   < > IHI5=785%   < > = values in this interval not displayed.  Culture Data   Recent Labs  Lab 03/02/18  0524 03/01/18  1627 03/01/18  1618 03/01/18  0527 03/01/18  0356 02/28/18  0631   CULT No growth after 6 hours PENDING  PENDING  PENDING  Canceled, Test credited  Test reordered as correct code PENDING  Canceled, Test credited  Test reordered as correct code  PENDING  PENDING Light growthNormal alo to date  Culture in progress No growth after 1 day No growth after 2 days     Virology Data: Lab Results   Component Value Date    FLUAH1 Negative 02/27/2018    FLUAH3 Negative 02/27/2018    LZ1979 Negative 02/27/2018    IFLUB Negative 02/27/2018    RSVA Negative 02/27/2018    RSVB Negative 02/27/2018    PIV1 Negative 02/27/2018    PIV2 Negative 02/27/2018    PIV3 Negative 02/27/2018    HMPV Negative 02/27/2018    HRVS Negative 02/27/2018    ADVBE Negative 02/27/2018    ADVC Negative 02/27/2018     Urine Studies  Recent Labs   Lab Test  03/01/18   0528   URINEPH  5.5   NITRITE  Negative   LEUKEST  Negative   WBCU  4     Most Recent [place] Pulmonary Function Testing (FVC/FEV1 only)  FVC-Pre   Date Value Ref Range Status   01/05/2018 1.17 L      FVC-%Pred-Pre   Date Value Ref Range Status   01/05/2018 35 %      FEV1-Pre   Date Value Ref Range Status   01/05/2018 0.92 L      FEV1-%Pred-Pre   Date Value Ref Range Status   01/05/2018 35 %        Physician Attestation   I, Tarik Mr Christensen, saw this patient with  and agree with the findings and plan of care as documented in the above note.      I personally reviewed vital signs, medications, labs and imaging.    Tarik Mr Christensen  Date of Service (when I saw the patient): 03/02/18

## 2018-03-02 NOTE — PLAN OF CARE
Problem: Patient Care Overview  Goal: Plan of Care/Patient Progress Review  Outcome: No Change  Pt arrived from OR s/p bilateral lung transplant around 2010 last night.  Pt remained on VA-ECMO post-operatively, sedated on a propofol infusion, continous fentanyl infusing for pain management.  SR-ST 80s-110s, occasional PACs.  CVP 6-10.  Epinephrine, norepinephrine and vasopressin drips titrated to keep MAPs >65.  Pt will occasionally have spontaneous and unprovoked drops in MAPs from 70s to 40s, recovers with titration of drips.  VA-ECMO flows ~3, RPMs 3000, sweep 2, FiO2 70%, please refer to ECMO progress note for further details.  Current vent settings: CMV, FiO2 40%, RR 12, PEEP 6, .  Flolan inhalation at 20 ng/kg/min.  Due to chugging on ECMO and correlating venous pressures, 1250 ml albumin was given throughout the course of the night.  Chest tube output 300-400 cc/hr post-operatively, Dr. Hi (CVTS moonlighter) notified frequently throughout the night regarding chest tube output, urine output, and fluid resuscitation requirements.  INR 1.76 after receiving two units FFP for INR 1.86, one more unit FFP given per orders.  ACT goals modified to 160-180, heparin drip titrated accordingly.  Chest tube output eventually slowed down to 30-60 cc/hr.  Total blood products given post-operatively: four units PRBCs, three units FFP, one unit platelets.  UOP 15-60 cc/hr, Dr. Hi aware.  Insulin drip titrated for keep blood glucose within target range per orders.  Potassium replacement administered per prn orders.  Tacrolimus infusing at 51.3 mcg/hr.

## 2018-03-02 NOTE — PROGRESS NOTES
Pt received Bilateral Lung transplant on 03/18/2018, donor ID [id number], removed from [facility] transplant waitlist.

## 2018-03-02 NOTE — PROGRESS NOTES
ACT's dropped below range so Heparin is being titrated back up. Chest tube output is being monitored closely and has decreased to around 30 ml/hr. Platelets just finished infusing as well. Circuit is clear of clot and fibrin.

## 2018-03-02 NOTE — PROGRESS NOTES
Donor Culture Results:    Final sputum culture results have been uploaded into [facility]. Donor ID [id number].  Dr. Christensen notified of results.

## 2018-03-02 NOTE — PROGRESS NOTES
ECMO Shift Summary:    Patient remains on V/A ECMO, all equipment is functioning and alarms are appropriately set. RPM's are set at 3000 with flows ranging from 2.81-3.10 L/min. Maps have been decreasing to the high forties without interact or a known source. Patient responds well to increases in pressors. Sweep gas is at 2 LPM and FiO2 is at 60%.Attempted to wean sweep x 2. Second attempt resulted in a CO2 increase so flow was returned to prior setting. Circuit remains free of air, clot and fibrin. Cannulas are secure with old blood and a small amount of new bleeding from both cannulation sites. Extremities are cool and pulses are weak. Suctioned ETT for pink tinged/ white secretions that are thick.    Significant Shift Events:    Vent settings:  Ventilation Mode: CMV/AC  (Continuous Mandatory Ventilation/ Assist Control)  FiO2 (%): 40 %  Rate Set (breaths/minute): 12 breaths/min  Tidal Volume Set (mL): 430 mL  PEEP (cm H2O): 6 cmH2O  Oxygen Concentration (%): 40 %  Resp: 12.    Heparin is running at 1000 u/hr, ACT range was decreased to 160-180 for high chest tube output post-op. ACT's have ranged from 205 at the start of my shift to as low as 139 after the platelet infusion. Chest tube output has decreased significantly from around 300 ml/hr to 30 ml/hr. MD originally wanted Heparin off until chest tube output slowed, but we collaborated on a plan to turn down significantly and then to cautiously titrate Heparin as needed.    Urine output is minimal , blood loss was significant from the 3 chest tubes(now slowed down considerably) and minimal from the cannula sites. Product given included 3 units of PRBC's, FFP x 1, and Platelets x1.      Intake/Output Summary (Last 24 hours) at 03/02/18 0603  Last data filed at 03/02/18 0600   Gross per 24 hour   Intake          9934.83 ml   Output             4415 ml   Net          5519.83 ml       ECHO:  No results found for this or any previous visit.  Results for orders  placed during the hospital encounter of 18   Echocardiogram    Narrative [id number]  ECH19  [id number]  [id number]^SHERINE^HODAN^MR           [facility],[place]  Echocardiography Laboratory  [address] [place] [id number]     Name: SARAI KILLIAN  MRN: [id number]  : 1960  Study Date: 2018 04:27 PM  Age: 57 yrs  Gender: Female  Patient Location: [facility]  Reason For Study: SOB  Ordering Physician: HODAN BASURTO  Referring Physician: NIKUNJ ADAIR  Performed By: Geovany Aguilar RDCS     BSA: 1.5 m2  Height: 63 in  Weight: 113 lb  BP: 101/69 mmHg  _____________________________________________________________________________  __        Procedure  Complete Portable Echo Adult.  _____________________________________________________________________________  __        Interpretation Summary  The LV cavity is small and is underfilled. The Ejection Fraction is estimated  at 65-70%. Flattened septum is consistent with right ventricular pressure and  volume overload.  The RV free wall is severely hypokinetic with some preserved contractility of  the RV apex. This is consistent with McConnels sign and may be concerning for  acute pulmonary embolism in the right clinical setting.  There is lack of coaptation of the tricuspid leaflets due to severe annular  dilation. Right ventricular systolic pressure is 88.79mmHg above the right  atrial pressure. Moderate tricuspid insufficiency is present.  Small, 1.6cm pericardial effusion primarily localized anterior to the RV free  wall.     Compared to prior study on 2018, RV is more dilated, RV function has  worsened and estimated PA pressure is higher. Patient is intubated at the time  of the study.  Discussed with MICU team at 5:05pm.  _____________________________________________________________________________  __        Left Ventricle  The LV cavity is small and is underfilled. The Ejection Fraction is estimated  at 65-70%.  Flattened septum is consistent with right ventricular pressure and  volume overload.     Right Ventricle  Severe right ventricular dilation is present. Global right ventricular  function is severely reduced. The RV free wall is severely hypokinetic with  some preserved contractility of the RV apex. This is consistent with McConnels  sign concerning for acute pulmonary embolism.     Atria  The left atrium appears normal. Moderate right atrial enlargement is present.     Mitral Valve  The mitral valve is normal.        Aortic Valve  The aortic valve cannot be assessed.     Tricuspid Valve  The tricuspid valve is normal. There is lack of coaptation of the tricuspid  leaflets due to severe annular dilation. Moderate tricuspid insufficiency is  present. Right ventricular systolic pressure is 88.79mmHg above the right  atrial pressure.     Pulmonic Valve  The pulmonic valve cannot be assessed.     Vessels  The aorta root cannot be assessed. The inferior vena cava is normal. Unable to  assess mean RA pressure given the patient is on a ventilator.     Pericardium  Small, 1.6cm pericardial effusion primarily localized anterior to the RV free  wall.     _____________________________________________________________________________  __  MMode/2D Measurements & Calculations  RVDd: 5.1 cm  TAPSE: 1.3 cm           Doppler Measurements & Calculations  TV max P.7 mmHg  TR max herberth: 439.4 cm/sec  TR max P.7 mmHg     _____________________________________________________________________________  __           Report approved by: ANGELIKA Luna 2018 05:15 PM          CXR:  Recent Results (from the past 24 hour(s))   XR Chest Port 1 View    Narrative    Chest radiograph  3/1/2018 8:33 PM    History:  Postop lung transplantation.     Comparison: Chest radiograph earlier today    Findings:   Portable supine AP chest radiograph. Endotracheal tube with the tip  projects 5.2 cm above gee. New biapical and bibasilar chest  tubes  and mediastinal drainage tube. Stable position of gastric and feeding  tube, right IJ catheter, left IJ central venous catheter.  Cardiac silhouette is stable. Pulmonary vasculature is distinct. No  pneumothorax or pleural effusion. The visualized upper abdomen is  unremarkable. Surgical clips projects over right axilla region.  Subcutaneous emphysema along the right neck.      Impression    IMPRESSION:  New biapical and basilar chest tubes, and mediastinal drainage tube.  Otherwise stable supportive lines and tubes.    I have personally reviewed the examination and initial interpretation  and I agree with the findings.    TALI CARRANZA MD       Labs:    Recent Labs  Lab 03/02/18  0541 03/02/18  0330 03/02/18  0040 03/01/18  2238   PH 7.32* 7.25* 7.42 7.46*   PCO2 38 49* 29* 25*   PO2 142* 131* 207* 196*   HCO3 20* 22 19* 18*   O2PER 40 40 40 40       Lab Results   Component Value Date    HGB 7.9 (L) 03/02/2018    PHGB 30 (H) 03/02/2018    PLT 92 (L) 03/02/2018    FIBR 216 03/02/2018    INR 1.51 (H) 03/02/2018    PTT 76 (H) 03/02/2018    DD <0.3 03/02/2018    AXA 0.23 03/02/2018    ANTCH 70 (L) 03/01/2018         Plan is to continue with ECMO support at this time.      Tish Chaney, RRT  3/2/2018 6:03 AM

## 2018-03-02 NOTE — OP NOTE
DATE OF SERVICE:  3/1/2018.     PREOPERATIVE DIAGNOSES:   1.  Dermatomyositis and interstitial lung disease.  2.  Severe end-stage life-threatening lung disease.   3.  Acute on chronic severe pulmonary hypertension.  3.  Acute right heart failure requiring venoarterial extracorporeal membrane oxygenation.     POSTOPERATIVE DIAGNOSES:   1.  Dermatomyositis and interstitial lung disease.  2.  Severe end-stage life-threatening lung disease.   3.  Acute on chronic severe pulmonary hypertension.  3.  Acute right heart failure requiring venoarterial extracorporeal membrane oxygenation.     PROCEDURE PERFORMED:   1.  Median sternotomy (patient had already had peripheral cannulation for extracorporeal membrane oxygenation).   2.  Bilateral transplant pneumonectomies.   3.  Back table preparation of bilateral single lungs for sequential transplantation.   4.  Bilateral sequential lung transplantation.      SURGEON:  Toby Hernandez MD, PhD      RESIDENT SURGEON:  Caryn Roche MD      ANESTHESIA:  General endotracheal anesthesia with a single-lumen endotracheal tube.      ESTIMATED BLOOD LOSS:  1 liter.      SPECIMENS:  Bilateral native lungs.      INDICATIONS FOR PROCEDURE:  Mrs. Blue is a 57-year-old woman with dermatomyositis and interstitial lung disease who has had a significant deterioration in her respiratory status and developed right heart failure secondary to pulmonary hypertension.  She was placed on peripheral venoarterial extracorporeal membrane oxygenation emergently.  She and her family understand the risks and benefits of the procedure and wish for her to undergo the operation.  She has been evaluated by a multidisciplinary transplant team, and she has been found to be an appropriate candidate for lung transplant.  A suitable donor is now available.      OPERATIVE FINDINGS:  There were no pleural-pleural adhesions.  There was severe hilar lymphadenopathy and traction bronchiectasis  bilaterally with foreshortening of the hilar structures.  After completion of the bronchial anastomosis on each side, flexible fiberoptic bronchoscopy demonstrated an intact and patent anastomosis. The patient remained on extracorporeal membrane oxygenation after the procedure as planned.  Coagulopathy was noted at the end of the procedure, and treatment was ongoing.      FINAL ABO CROSSMATCH VERIFICATION:  After the donor organ arrived to the operating room and prior to anastomosis, I participated in the transplant preverification upon organ receipt timeout by visually verifying the donor organ, organ and laterality, donor blood type, recipient unique identifier, recipient blood type, and that the donor and recipient are blood type compatible.      OPERATIVE DESCRIPTION IN DETAIL:  After obtaining informed consent, the patient was brought to the operating room and placed in the supine position on the operating room table.  Appropriate lines and devices for monitoring were placed by the Anesthesia team.  The patient underwent smooth induction of general anesthesia. The trachea was already intubated orally with a single-lumen endotracheal tube.  The patient was prepped and draped. A timeout was performed to confirm the correct patient identity, as well as the procedure to be performed. The patient was already on venoarterial extracorporeal membrane oxygenation, and the goal ACT was increased during the case from 160-180 seconds to 250-300 seconds.     A median sternotomy was performed, and the pleural spaces were opened bilaterally. The right inferior pulmonary ligament was divided with electrocautery.  Hilar dissection was carried out on the right side with circumferential dissection around the right superior, middle and inferior pulmonary veins.  The right pulmonary artery was dissected circumferentially, and as far proximally as possible.  Attention was then turned to the left side.  The left inferior pulmonary  ligament was divided with electrocautery.  Hilar dissection was completed with circumferential dissection around the left superior and inferior pulmonary veins.  The left pulmonary artery was dissected circumferentially and as far proximal as possible.      Attention was then turned to the right pleural space where a donor right pneumonectomy was performed as follows:  The right superior, middle and inferior pulmonary veins were divided with endoscopic staplers. The right apical trunk was stapled with an endoscopic vascular load stapler.  The right pulmonary artery was clamped with a Derra clamp proximally, and it was divided distally with the Metzenbaum scissors just beyond the takeoff of the right chano-apical trunk. This was not incorporated into the anastomosis.  The right main stem bronchus was divided with a #10 blade scalpel.  The bronchiolar arteries were ligated with Hemoclips.  The pulmonary vein stumps were grasped and retracted laterally.  The pericardium was opened circumferentially to create an atrial sewing cuff.    The donor lungs were prepared on the back table as follows:  The attached pericardium and lymphatics were divided in the midline.  The left atrial cuff was divided in the midline.  The main pulmonary artery was divided along the raphe.  The left main stem bronchus was divided at the level of the gee.  Attention was turned to the left lung.  The left pulmonary vein cuff was  from the pulmonary artery using sharp dissection.  The left pulmonary artery was  from the left main stem bronchus using sharp dissection.  The left main stem bronchus was divided with a #11 blade scalpel 2 rings proximal to the bifurcation of the left main stem bronchus.  The left lung was placed on ice saline, and attention was turned to the right donor lung which was prepared as follows:  The atrial cuff was  from the right pulmonary artery with sharp dissection, and the right pulmonary  artery was  from the right main stem bronchus with sharp dissection. The right main stem bronchus was divided sharply with a #11 blade scalpel 2 tracheal rings proximal to the takeoff of the right upper lobe bronchus.       The right donor lung was brought into the right pleural space in appropriate orientation.  The bronchial anastomosis was performed in end-to-end fashion using running 4-0 Prolene.  The right pulmonary artery anastomosis was performed in end-to-end fashion using running 5-0 Prolene.  A Clinton clamp was placed across the left atrium proximal to the pulmonary vein stumps.  These were resected sharply, and then the venotomies were connected to create an atrial sewing cuff.  This was sewn to the donor atrial sewing cuff in end-to-end fashion using running 4-0 Prolene, taking care to imbricate the posterior wall while the anterior wall was completed in running continuous fashion.  This anastomosis was deaired by releasing the Derra clamp on the pulmonary artery, and then the K-clamp on the left atrium before tying down the sutures.  Hemostasis was confirmed.     A left native pneumonectomy was performed as follows:  The left superior and inferior pulmonary veins were ligated and divided with endoscopic staplers.  A Derra clamp was placed proximally on the left pulmonary artery which was divided distally at the point of takeoff of the anterior apical trunk.  Lymphatics were dissected off of the left main stem bronchus and the left main stem bronchus was divided sharply with a #10 blade scalpel.  Hemostasis of the bronchial arteries was achieved with Hemoclips.  The left native lung was delivered from the field as a specimen.  The left pulmonary vein stumps were grasped with Allis clamps and retracted laterally.  The pericardium was opened circumferentially to create a left atrial sewing cuff and the ligament of Srinivas was divided with electrocautery.      The left lung was brought into the  left pleural space in appropriate orientation.  The left bronchial anastomosis was performed in end-to-end fashion using running 4-0 Prolene.  The left pulmonary artery anastomosis was performed in end-to-end fashion using running 5-0 Prolene.  A Clinton clamp was applied proximally across the left atrium, and the left pulmonary vein stumps were resected sharply.  The venotomies were connected sharply to create a left atrial sewing cuff.  This was sewn in an end-to-end fashion to the donor left pulmonary vein sewing cuff with 4-0 Prolene, taking care to imbricate the posterior wall while the anterior wall was run in simple continuous fashion.  This anastomosis was deaired by releasing the Derra clamp on the pulmonary artery and then the Od[place] clamp was released on the left atrium before tying down the sutures.      Ventilation was commenced.  The patient was also restarted on Flolan and low-dose inotropic agents.  All bleeding points were controlled.  Topical hemostatic agents were applied.  A 32-Comoran straight chest tube was placed anteriorly and apically on the right side and brought out through a separate stab incision.  A 24-Comoran John drain was placed in the diaphragmatic sulcus on the right side and brought out through a separate stab incision.  A 32-Comoran straight chest tube was placed anteriorly and apically on the left side and brought out through a separate stab incision.  A 24-Comoran John drain was placed in the diaphragmatic sulcus on the left side and brought out through a separate stab incision.  A 24-Comoran John drain was placed in the pericardial space and brought out through a separate stab incision.  The sternotomy incision was closed with 7 interrupted #6 stainless steel sternal wires.  The muscle, fascia and skin were closed in layers with absorbable suture.  Dermabond was applied.  All sponge, needle and instrument counts were correct at the end of the case. For the right lung, the total  ischemic time was 5 hours and 5 minutes.  Warm ischemic time was 36 minutes. For the left lung, total ischemic time was 6 hours and 23 minutes.  Warm ischemic time was 44 minutes.         ELEANOR SANCHEZ MD

## 2018-03-02 NOTE — PROGRESS NOTES
ECMO Shift Summary:    Patient remains on V-A ECMO, all equipment is functioning and alarms are appropriately set. RPM's 3000 with flow range 2.78 to 3.1 L/min. Sweep gas is at 3 LPM and FiO2 70%. Circuit remains free of air, clot and fibrin. Cannulas are secure with no bleeding from site. Extremities are warm. Suctioned ETT for small amount of clear sputum.  The patient had a bilateral lung transplant today.    Significant Shift Events:    Vent settings:  Ventilation Mode: CMV/AC  (Continuous Mandatory Ventilation/ Assist Control)  FiO2 (%): 80 %  Rate Set (breaths/minute): 12 breaths/min  Tidal Volume Set (mL): 430 mL  PEEP (cm H2O): 6 cmH2O  Oxygen Concentration (%): 40 %  Resp: 28.    Heparin is running at 33.21 u/kg/hr, ACT range 172.    Urine output is 30 to 50 cc/hr, blood loss was 1000 cc. Product given included 4 units of RBC, 3 units of FFP all including product given in the OR.      Intake/Output Summary (Last 24 hours) at 18 2213  Last data filed at 18 2145   Gross per 24 hour   Intake           7113.5 ml   Output             2170 ml   Net           4943.5 ml       ECHO:  No results found for this or any previous visit.  Results for orders placed during the hospital encounter of 18   Echocardiogram    Narrative [id number]  ECH19  [id number]  [id number]^SHERINE^HODAN^MR           [facility],[place]  Echocardiography Laboratory  [address] [place] [address]     Name: SARAI KILLIAN  MRN: [id number]  : 1960  Study Date: 2018 04:27 PM  Age: 57 yrs  Gender: Female  Patient Location: [facility]  Reason For Study: SOB  Ordering Physician: HODAN BASURTO  Referring Physician: NIKUNJ ADAIR  Performed By: Geovany Aguilar RDCS     BSA: 1.5 m2  Height: 63 in  Weight: 113 lb  BP: 101/69 mmHg  _____________________________________________________________________________  __        Procedure  Complete Portable Echo  Adult.  _____________________________________________________________________________  __        Interpretation Summary  The LV cavity is small and is underfilled. The Ejection Fraction is estimated  at 65-70%. Flattened septum is consistent with right ventricular pressure and  volume overload.  The RV free wall is severely hypokinetic with some preserved contractility of  the RV apex. This is consistent with McConnels sign and may be concerning for  acute pulmonary embolism in the right clinical setting.  There is lack of coaptation of the tricuspid leaflets due to severe annular  dilation. Right ventricular systolic pressure is 88.79mmHg above the right  atrial pressure. Moderate tricuspid insufficiency is present.  Small, 1.6cm pericardial effusion primarily localized anterior to the RV free  wall.     Compared to prior study on 2/19/2018, RV is more dilated, RV function has  worsened and estimated PA pressure is higher. Patient is intubated at the time  of the study.  Discussed with MICU team at 5:05pm.  _____________________________________________________________________________  __        Left Ventricle  The LV cavity is small and is underfilled. The Ejection Fraction is estimated  at 65-70%. Flattened septum is consistent with right ventricular pressure and  volume overload.     Right Ventricle  Severe right ventricular dilation is present. Global right ventricular  function is severely reduced. The RV free wall is severely hypokinetic with  some preserved contractility of the RV apex. This is consistent with McConnels  sign concerning for acute pulmonary embolism.     Atria  The left atrium appears normal. Moderate right atrial enlargement is present.     Mitral Valve  The mitral valve is normal.        Aortic Valve  The aortic valve cannot be assessed.     Tricuspid Valve  The tricuspid valve is normal. There is lack of coaptation of the tricuspid  leaflets due to severe annular dilation. Moderate  tricuspid insufficiency is  present. Right ventricular systolic pressure is 88.79mmHg above the right  atrial pressure.     Pulmonic Valve  The pulmonic valve cannot be assessed.     Vessels  The aorta root cannot be assessed. The inferior vena cava is normal. Unable to  assess mean RA pressure given the patient is on a ventilator.     Pericardium  Small, 1.6cm pericardial effusion primarily localized anterior to the RV free  wall.     _____________________________________________________________________________  __  MMode/2D Measurements & Calculations  RVDd: 5.1 cm  TAPSE: 1.3 cm           Doppler Measurements & Calculations  TV max P.7 mmHg  TR max herberth: 439.4 cm/sec  TR max P.7 mmHg     _____________________________________________________________________________  __           Report approved by: ANGELIKA Luna 2018 05:15 PM          CXR:  Recent Results (from the past 24 hour(s))   XR Chest Port 1 View    Narrative    XR CHEST PORT 1 VW  3/1/2018 2:17 AM    History: Endotracheal tube placement    Comparison: Prior day    Findings:   Single portable AP view of the chest is obtained. Endotracheal tube is  in stable position. Right internal jugular central venous catheter tip  projects over the mid SVC. There is a right inferior approach ECMO  cannula with the tip projecting near the superior cavoatrial junction.  Postsurgical changes of a subclavian artery cutdown for ECMO access  projecting over the right shoulder. Gastric tube tip and sidehole  projecting over left upper quadrant. Feeding type tube tip projects  over the distal stomach.    The cardiomediastinal silhouette is not enlarged. Diffuse interstitial  fibrotic opacities are again noted. Stable appearance of the overlying  scattered patchy opacities, most significant in the left lateral lower  lobe. No suspected pleural effusion. No pneumothorax.      Impression    IMPRESSION:  1. Stable lines, tubes, and post surgical changes.  2. Stable  diffuse interstitial opacities with overlying patchy  opacities which could represent atelectasis or airspace consolidation.    I have personally reviewed the examination and initial interpretation  and I agree with the findings.    YONATAN HERNANDES MD   XR Chest Port 1 View    Impression    IMPRESSION:  New biapical and basilar chest tubes, and mediastinal drainage tube.  Otherwise stable supportive lines and tubes.       Labs:    Recent Labs  Lab 03/01/18  2017 03/01/18  1949 03/01/18  1850 03/01/18  1841   PH 7.25* 7.20* 7.25* 7.36   PCO2 50* 50* 47* 37   PO2 413* 412* 224* 425*   HCO3 22 19* 21 21   O2PER 80 100.0 100.0 100.0       Lab Results   Component Value Date    HGB 9.6 (L) 03/01/2018    PHGB <30 03/01/2018     03/01/2018    FIBR 265 03/01/2018    INR 1.86 (H) 03/01/2018    PTT >240 (HH) 03/01/2018    DD 1.1 (H) 03/01/2018    AXA 0.60 03/01/2018    ANTCH 70 (L) 03/01/2018         Plan is to remain on V-A ECMO for recovery from the bilateral lung transplant.      Lincoln Nassar, RRT  3/1/2018 10:13 PM

## 2018-03-03 ENCOUNTER — APPOINTMENT (OUTPATIENT)
Dept: GENERAL RADIOLOGY | Facility: CLINIC | Age: 58
DRG: 003 | End: 2018-03-03
Attending: THORACIC SURGERY (CARDIOTHORACIC VASCULAR SURGERY)
Payer: COMMERCIAL

## 2018-03-03 ENCOUNTER — APPOINTMENT (OUTPATIENT)
Dept: GENERAL RADIOLOGY | Facility: CLINIC | Age: 58
DRG: 003 | End: 2018-03-03
Attending: SURGERY
Payer: COMMERCIAL

## 2018-03-03 LAB
ABO + RH BLD: NORMAL
ABO + RH BLD: NORMAL
ALBUMIN SERPL-MCNC: 2.4 G/DL (ref 3.4–5)
ALP SERPL-CCNC: 27 U/L (ref 40–150)
ALT SERPL W P-5'-P-CCNC: 29 U/L (ref 0–50)
ANION GAP SERPL CALCULATED.3IONS-SCNC: 10 MMOL/L (ref 3–14)
ANION GAP SERPL CALCULATED.3IONS-SCNC: 11 MMOL/L (ref 3–14)
ANION GAP SERPL CALCULATED.3IONS-SCNC: 9 MMOL/L (ref 3–14)
APTT PPP: 28 SEC (ref 22–37)
APTT PPP: 29 SEC (ref 22–37)
APTT PPP: 30 SEC (ref 22–37)
APTT PPP: 30 SEC (ref 22–37)
APTT PPP: 31 SEC (ref 22–37)
APTT PPP: >240 SEC (ref 22–37)
AST SERPL W P-5'-P-CCNC: 34 U/L (ref 0–45)
AT III ACT/NOR PPP CHRO: 55 % (ref 85–135)
BACTERIA SPEC CULT: ABNORMAL
BACTERIA SPEC CULT: ABNORMAL
BACTERIA SPEC CULT: NO GROWTH
BACTERIA SPEC CULT: NORMAL
BASE DEFICIT BLDA-SCNC: 0.8 MMOL/L
BASE DEFICIT BLDA-SCNC: 1.3 MMOL/L
BASE DEFICIT BLDA-SCNC: 2.2 MMOL/L
BASE DEFICIT BLDA-SCNC: 2.5 MMOL/L
BASE DEFICIT BLDA-SCNC: 2.9 MMOL/L
BASE DEFICIT BLDA-SCNC: 3.9 MMOL/L
BASE DEFICIT BLDA-SCNC: 3.9 MMOL/L
BASE DEFICIT BLDA-SCNC: 4.4 MMOL/L
BASE DEFICIT BLDA-SCNC: 4.8 MMOL/L
BASE DEFICIT BLDA-SCNC: 5 MMOL/L
BASE DEFICIT BLDA-SCNC: 5.3 MMOL/L
BASE DEFICIT BLDA-SCNC: 5.6 MMOL/L
BASE DEFICIT BLDA-SCNC: 5.7 MMOL/L
BASE DEFICIT BLDA-SCNC: 6.4 MMOL/L
BASE DEFICIT BLDV-SCNC: 4.1 MMOL/L
BASE DEFICIT BLDV-SCNC: 4.7 MMOL/L
BASE DEFICIT BLDV-SCNC: 6.1 MMOL/L
BASE EXCESS BLDV CALC-SCNC: 0 MMOL/L
BASOPHILS # BLD AUTO: 0 10E9/L (ref 0–0.2)
BASOPHILS NFR BLD AUTO: 0.1 %
BILIRUB DIRECT SERPL-MCNC: 0.9 MG/DL (ref 0–0.2)
BILIRUB SERPL-MCNC: 1.5 MG/DL (ref 0.2–1.3)
BLD GP AB SCN SERPL QL: NORMAL
BLD PROD TYP BPU: NORMAL
BLD UNIT ID BPU: 0
BLOOD BANK CMNT PATIENT-IMP: NORMAL
BLOOD PRODUCT CODE: NORMAL
BPU ID: NORMAL
BUN SERPL-MCNC: 53 MG/DL (ref 7–30)
BUN SERPL-MCNC: 59 MG/DL (ref 7–30)
BUN SERPL-MCNC: 59 MG/DL (ref 7–30)
BUN SERPL-MCNC: 60 MG/DL (ref 7–30)
BUN SERPL-MCNC: 63 MG/DL (ref 7–30)
CA-I BLD-MCNC: 4.2 MG/DL (ref 4.4–5.2)
CA-I BLD-MCNC: 4.2 MG/DL (ref 4.4–5.2)
CA-I BLD-MCNC: 4.5 MG/DL (ref 4.4–5.2)
CA-I BLD-MCNC: 5.2 MG/DL (ref 4.4–5.2)
CALCIUM SERPL-MCNC: 7.1 MG/DL (ref 8.5–10.1)
CALCIUM SERPL-MCNC: 7.6 MG/DL (ref 8.5–10.1)
CALCIUM SERPL-MCNC: 7.7 MG/DL (ref 8.5–10.1)
CALCIUM SERPL-MCNC: 7.8 MG/DL (ref 8.5–10.1)
CALCIUM SERPL-MCNC: 7.8 MG/DL (ref 8.5–10.1)
CHLORIDE SERPL-SCNC: 116 MMOL/L (ref 94–109)
CHLORIDE SERPL-SCNC: 117 MMOL/L (ref 94–109)
CHLORIDE SERPL-SCNC: 119 MMOL/L (ref 94–109)
CO2 SERPL-SCNC: 20 MMOL/L (ref 20–32)
CO2 SERPL-SCNC: 20 MMOL/L (ref 20–32)
CO2 SERPL-SCNC: 22 MMOL/L (ref 20–32)
CO2 SERPL-SCNC: 23 MMOL/L (ref 20–32)
CO2 SERPL-SCNC: 24 MMOL/L (ref 20–32)
CREAT SERPL-MCNC: 1.8 MG/DL (ref 0.52–1.04)
CREAT SERPL-MCNC: 1.83 MG/DL (ref 0.52–1.04)
CREAT SERPL-MCNC: 1.95 MG/DL (ref 0.52–1.04)
CREAT SERPL-MCNC: 1.95 MG/DL (ref 0.52–1.04)
CREAT SERPL-MCNC: 2.11 MG/DL (ref 0.52–1.04)
D DIMER PPP FEU-MCNC: 0.3 UG/ML FEU (ref 0–0.5)
D DIMER PPP FEU-MCNC: 0.4 UG/ML FEU (ref 0–0.5)
DIFFERENTIAL METHOD BLD: ABNORMAL
EOSINOPHIL # BLD AUTO: 0 10E9/L (ref 0–0.7)
EOSINOPHIL NFR BLD AUTO: 0 %
ERYTHROCYTE [DISTWIDTH] IN BLOOD BY AUTOMATED COUNT: 15.1 % (ref 10–15)
ERYTHROCYTE [DISTWIDTH] IN BLOOD BY AUTOMATED COUNT: 15.3 % (ref 10–15)
ERYTHROCYTE [DISTWIDTH] IN BLOOD BY AUTOMATED COUNT: 15.4 % (ref 10–15)
ERYTHROCYTE [DISTWIDTH] IN BLOOD BY AUTOMATED COUNT: 15.4 % (ref 10–15)
ERYTHROCYTE [DISTWIDTH] IN BLOOD BY AUTOMATED COUNT: 15.7 % (ref 10–15)
FIBRINOGEN PPP-MCNC: 198 MG/DL (ref 200–420)
FIBRINOGEN PPP-MCNC: 221 MG/DL (ref 200–420)
FIBRINOGEN PPP-MCNC: 227 MG/DL (ref 200–420)
FIBRINOGEN PPP-MCNC: 263 MG/DL (ref 200–420)
FIBRINOGEN PPP-MCNC: 276 MG/DL (ref 200–420)
FIBRINOGEN PPP-MCNC: 283 MG/DL (ref 200–420)
GFR SERPL CREATININE-BSD FRML MDRD: 24 ML/MIN/1.7M2
GFR SERPL CREATININE-BSD FRML MDRD: 27 ML/MIN/1.7M2
GFR SERPL CREATININE-BSD FRML MDRD: 27 ML/MIN/1.7M2
GFR SERPL CREATININE-BSD FRML MDRD: 29 ML/MIN/1.7M2
GFR SERPL CREATININE-BSD FRML MDRD: 29 ML/MIN/1.7M2
GLUCOSE BLD-MCNC: 196 MG/DL (ref 70–99)
GLUCOSE BLD-MCNC: 231 MG/DL (ref 70–99)
GLUCOSE BLD-MCNC: 238 MG/DL (ref 70–99)
GLUCOSE BLDC GLUCOMTR-MCNC: 103 MG/DL (ref 70–99)
GLUCOSE BLDC GLUCOMTR-MCNC: 106 MG/DL (ref 70–99)
GLUCOSE BLDC GLUCOMTR-MCNC: 109 MG/DL (ref 70–99)
GLUCOSE BLDC GLUCOMTR-MCNC: 127 MG/DL (ref 70–99)
GLUCOSE BLDC GLUCOMTR-MCNC: 135 MG/DL (ref 70–99)
GLUCOSE BLDC GLUCOMTR-MCNC: 139 MG/DL (ref 70–99)
GLUCOSE BLDC GLUCOMTR-MCNC: 140 MG/DL (ref 70–99)
GLUCOSE BLDC GLUCOMTR-MCNC: 145 MG/DL (ref 70–99)
GLUCOSE BLDC GLUCOMTR-MCNC: 150 MG/DL (ref 70–99)
GLUCOSE BLDC GLUCOMTR-MCNC: 152 MG/DL (ref 70–99)
GLUCOSE BLDC GLUCOMTR-MCNC: 153 MG/DL (ref 70–99)
GLUCOSE BLDC GLUCOMTR-MCNC: 156 MG/DL (ref 70–99)
GLUCOSE BLDC GLUCOMTR-MCNC: 156 MG/DL (ref 70–99)
GLUCOSE BLDC GLUCOMTR-MCNC: 157 MG/DL (ref 70–99)
GLUCOSE BLDC GLUCOMTR-MCNC: 159 MG/DL (ref 70–99)
GLUCOSE BLDC GLUCOMTR-MCNC: 165 MG/DL (ref 70–99)
GLUCOSE BLDC GLUCOMTR-MCNC: 190 MG/DL (ref 70–99)
GLUCOSE SERPL-MCNC: 105 MG/DL (ref 70–99)
GLUCOSE SERPL-MCNC: 134 MG/DL (ref 70–99)
GLUCOSE SERPL-MCNC: 154 MG/DL (ref 70–99)
GLUCOSE SERPL-MCNC: 168 MG/DL (ref 70–99)
GLUCOSE SERPL-MCNC: 202 MG/DL (ref 70–99)
HCO3 BLD-SCNC: 18 MMOL/L (ref 21–28)
HCO3 BLD-SCNC: 19 MMOL/L (ref 21–28)
HCO3 BLD-SCNC: 20 MMOL/L (ref 21–28)
HCO3 BLD-SCNC: 21 MMOL/L (ref 21–28)
HCO3 BLD-SCNC: 23 MMOL/L (ref 21–28)
HCO3 BLDA-SCNC: 20 MMOL/L (ref 21–28)
HCO3 BLDV-SCNC: 20 MMOL/L (ref 21–28)
HCO3 BLDV-SCNC: 20 MMOL/L (ref 21–28)
HCO3 BLDV-SCNC: 21 MMOL/L (ref 21–28)
HCO3 BLDV-SCNC: 24 MMOL/L (ref 21–28)
HCT VFR BLD AUTO: 26.8 % (ref 35–47)
HCT VFR BLD AUTO: 27.3 % (ref 35–47)
HCT VFR BLD AUTO: 27.4 % (ref 35–47)
HCT VFR BLD AUTO: 28.8 % (ref 35–47)
HCT VFR BLD AUTO: 33.8 % (ref 35–47)
HGB BLD-MCNC: 10 G/DL (ref 11.7–15.7)
HGB BLD-MCNC: 11.2 G/DL (ref 11.7–15.7)
HGB BLD-MCNC: 11.8 G/DL (ref 11.7–15.7)
HGB BLD-MCNC: 8 G/DL (ref 11.7–15.7)
HGB BLD-MCNC: 8.2 G/DL (ref 11.7–15.7)
HGB BLD-MCNC: 9.4 G/DL (ref 11.7–15.7)
HGB BLD-MCNC: 9.6 G/DL (ref 11.7–15.7)
HGB BLD-MCNC: 9.6 G/DL (ref 11.7–15.7)
HGB FREE PLAS-MCNC: 40 MG/DL
IMM GRANULOCYTES # BLD: 0.1 10E9/L (ref 0–0.4)
IMM GRANULOCYTES NFR BLD: 0.4 %
INR PPP: 1.13 (ref 0.86–1.14)
INR PPP: 1.15 (ref 0.86–1.14)
INR PPP: 1.2 (ref 0.86–1.14)
INR PPP: 1.35 (ref 0.86–1.14)
INR PPP: 1.36 (ref 0.86–1.14)
INR PPP: 1.66 (ref 0.86–1.14)
KCT BLD-ACNC: 139 SEC (ref 75–150)
KCT BLD-ACNC: 143 SEC (ref 75–150)
KCT BLD-ACNC: 148 SEC (ref 75–150)
KCT BLD-ACNC: 152 SEC (ref 75–150)
KCT BLD-ACNC: 152 SEC (ref 75–150)
KCT BLD-ACNC: 156 SEC (ref 75–150)
KCT BLD-ACNC: 160 SEC (ref 75–150)
KCT BLD-ACNC: 164 SEC (ref 75–150)
KCT BLD-ACNC: 168 SEC (ref 75–150)
KCT BLD-ACNC: 172 SEC (ref 75–150)
KCT BLD-ACNC: 176 SEC (ref 75–150)
KCT BLD-ACNC: 192 SEC (ref 75–150)
KCT BLD-ACNC: 205 SEC (ref 75–150)
KCT BLD-ACNC: 208 SEC (ref 75–150)
KCT BLD-ACNC: 209 SEC (ref 75–150)
KCT BLD-ACNC: 213 SEC (ref 75–150)
KCT BLD-ACNC: 229 SEC (ref 75–150)
LACTATE BLD-SCNC: 1 MMOL/L (ref 0.7–2)
LACTATE BLD-SCNC: 1.1 MMOL/L (ref 0.7–2)
LACTATE BLD-SCNC: 1.3 MMOL/L (ref 0.7–2)
LACTATE BLD-SCNC: 1.4 MMOL/L (ref 0.7–2)
LACTATE BLD-SCNC: 1.5 MMOL/L (ref 0.7–2)
LACTATE BLD-SCNC: 1.5 MMOL/L (ref 0.7–2)
LACTATE BLD-SCNC: 1.7 MMOL/L (ref 0.7–2)
LACTATE BLD-SCNC: 1.8 MMOL/L (ref 0.7–2)
LMWH PPP CHRO-ACNC: 1.31 IU/ML
LMWH PPP CHRO-ACNC: <0.1 IU/ML
LMWH PPP CHRO-ACNC: <0.1 IU/ML
LYMPHOCYTES # BLD AUTO: 0.4 10E9/L (ref 0.8–5.3)
LYMPHOCYTES NFR BLD AUTO: 2.9 %
Lab: ABNORMAL
Lab: NORMAL
Lab: NORMAL
MAGNESIUM SERPL-MCNC: 2.1 MG/DL (ref 1.6–2.3)
MAGNESIUM SERPL-MCNC: 2.7 MG/DL (ref 1.6–2.3)
MAGNESIUM SERPL-MCNC: 2.9 MG/DL (ref 1.6–2.3)
MCH RBC QN AUTO: 30.1 PG (ref 26.5–33)
MCH RBC QN AUTO: 30.2 PG (ref 26.5–33)
MCH RBC QN AUTO: 30.3 PG (ref 26.5–33)
MCH RBC QN AUTO: 30.5 PG (ref 26.5–33)
MCH RBC QN AUTO: 30.7 PG (ref 26.5–33)
MCHC RBC AUTO-ENTMCNC: 34.7 G/DL (ref 31.5–36.5)
MCHC RBC AUTO-ENTMCNC: 34.9 G/DL (ref 31.5–36.5)
MCHC RBC AUTO-ENTMCNC: 35 G/DL (ref 31.5–36.5)
MCHC RBC AUTO-ENTMCNC: 35.1 G/DL (ref 31.5–36.5)
MCHC RBC AUTO-ENTMCNC: 35.2 G/DL (ref 31.5–36.5)
MCV RBC AUTO: 86 FL (ref 78–100)
MCV RBC AUTO: 87 FL (ref 78–100)
MONOCYTES # BLD AUTO: 1.1 10E9/L (ref 0–1.3)
MONOCYTES NFR BLD AUTO: 7.4 %
NEUTROPHILS # BLD AUTO: 12.7 10E9/L (ref 1.6–8.3)
NEUTROPHILS NFR BLD AUTO: 89.2 %
NRBC # BLD AUTO: 0.1 10*3/UL
NRBC BLD AUTO-RTO: 1 /100
NUM BPU REQUESTED: 1
NUM BPU REQUESTED: 1
NUM BPU REQUESTED: 11
O2/TOTAL GAS SETTING VFR VENT: 100 %
O2/TOTAL GAS SETTING VFR VENT: 50 %
O2/TOTAL GAS SETTING VFR VENT: 60 %
O2/TOTAL GAS SETTING VFR VENT: 70 %
O2/TOTAL GAS SETTING VFR VENT: 80 %
O2/TOTAL GAS SETTING VFR VENT: 80 %
OXYHGB MFR BLD: 95 % (ref 92–100)
OXYHGB MFR BLD: 96 % (ref 92–100)
OXYHGB MFR BLD: 97 % (ref 92–100)
OXYHGB MFR BLD: 98 % (ref 92–100)
OXYHGB MFR BLDA: 93 % (ref 75–100)
OXYHGB MFR BLDV: 64 %
OXYHGB MFR BLDV: 65 %
OXYHGB MFR BLDV: 65 %
OXYHGB MFR BLDV: 68 %
PCO2 BLD: 26 MM HG (ref 35–45)
PCO2 BLD: 27 MM HG (ref 35–45)
PCO2 BLD: 28 MM HG (ref 35–45)
PCO2 BLD: 30 MM HG (ref 35–45)
PCO2 BLD: 30 MM HG (ref 35–45)
PCO2 BLD: 31 MM HG (ref 35–45)
PCO2 BLD: 32 MM HG (ref 35–45)
PCO2 BLD: 33 MM HG (ref 35–45)
PCO2 BLD: 34 MM HG (ref 35–45)
PCO2 BLD: 35 MM HG (ref 35–45)
PCO2 BLD: 36 MM HG (ref 35–45)
PCO2 BLDA: 39 MM HG (ref 35–45)
PCO2 BLDV: 29 MM HG (ref 40–50)
PCO2 BLDV: 36 MM HG (ref 40–50)
PCO2 BLDV: 40 MM HG (ref 40–50)
PCO2 BLDV: 42 MM HG (ref 40–50)
PH BLD: 7.35 PH (ref 7.35–7.45)
PH BLD: 7.35 PH (ref 7.35–7.45)
PH BLD: 7.36 PH (ref 7.35–7.45)
PH BLD: 7.37 PH (ref 7.35–7.45)
PH BLD: 7.37 PH (ref 7.35–7.45)
PH BLD: 7.38 PH (ref 7.35–7.45)
PH BLD: 7.4 PH (ref 7.35–7.45)
PH BLD: 7.43 PH (ref 7.35–7.45)
PH BLD: 7.46 PH (ref 7.35–7.45)
PH BLD: 7.47 PH (ref 7.35–7.45)
PH BLD: 7.47 PH (ref 7.35–7.45)
PH BLD: 7.48 PH (ref 7.35–7.45)
PH BLD: 7.5 PH (ref 7.35–7.45)
PH BLDA: 7.32 PH (ref 7.35–7.45)
PH BLDV: 7.31 PH (ref 7.32–7.43)
PH BLDV: 7.31 PH (ref 7.32–7.43)
PH BLDV: 7.43 PH (ref 7.32–7.43)
PH BLDV: 7.44 PH (ref 7.32–7.43)
PHOSPHATE SERPL-MCNC: 4.4 MG/DL (ref 2.5–4.5)
PLATELET # BLD AUTO: 137 10E9/L (ref 150–450)
PLATELET # BLD AUTO: 141 10E9/L (ref 150–450)
PLATELET # BLD AUTO: 146 10E9/L (ref 150–450)
PLATELET # BLD AUTO: 73 10E9/L (ref 150–450)
PLATELET # BLD AUTO: 87 10E9/L (ref 150–450)
PLATELET # BLD AUTO: 90 10E9/L (ref 150–450)
PO2 BLD: 105 MM HG (ref 80–105)
PO2 BLD: 161 MM HG (ref 80–105)
PO2 BLD: 174 MM HG (ref 80–105)
PO2 BLD: 189 MM HG (ref 80–105)
PO2 BLD: 200 MM HG (ref 80–105)
PO2 BLD: 205 MM HG (ref 80–105)
PO2 BLD: 244 MM HG (ref 80–105)
PO2 BLD: 263 MM HG (ref 80–105)
PO2 BLD: 274 MM HG (ref 80–105)
PO2 BLD: 284 MM HG (ref 80–105)
PO2 BLD: 357 MM HG (ref 80–105)
PO2 BLD: 540 MM HG (ref 80–105)
PO2 BLD: 98 MM HG (ref 80–105)
PO2 BLDA: 76 MM HG (ref 80–105)
PO2 BLDV: 33 MM HG (ref 25–47)
PO2 BLDV: 33 MM HG (ref 25–47)
PO2 BLDV: 34 MM HG (ref 25–47)
PO2 BLDV: 34 MM HG (ref 25–47)
POTASSIUM BLD-SCNC: 4 MMOL/L (ref 3.4–5.3)
POTASSIUM BLD-SCNC: 4.1 MMOL/L (ref 3.4–5.3)
POTASSIUM BLD-SCNC: 4.2 MMOL/L (ref 3.4–5.3)
POTASSIUM SERPL-SCNC: 4.1 MMOL/L (ref 3.4–5.3)
POTASSIUM SERPL-SCNC: 4.2 MMOL/L (ref 3.4–5.3)
POTASSIUM SERPL-SCNC: 4.2 MMOL/L (ref 3.4–5.3)
PROT SERPL-MCNC: 4 G/DL (ref 6.8–8.8)
RBC # BLD AUTO: 3.12 10E12/L (ref 3.8–5.2)
RBC # BLD AUTO: 3.13 10E12/L (ref 3.8–5.2)
RBC # BLD AUTO: 3.15 10E12/L (ref 3.8–5.2)
RBC # BLD AUTO: 3.31 10E12/L (ref 3.8–5.2)
RBC # BLD AUTO: 3.89 10E12/L (ref 3.8–5.2)
SODIUM BLD-SCNC: 142 MMOL/L (ref 133–144)
SODIUM BLD-SCNC: 144 MMOL/L (ref 133–144)
SODIUM BLD-SCNC: 147 MMOL/L (ref 133–144)
SODIUM SERPL-SCNC: 147 MMOL/L (ref 133–144)
SODIUM SERPL-SCNC: 148 MMOL/L (ref 133–144)
SODIUM SERPL-SCNC: 149 MMOL/L (ref 133–144)
SODIUM SERPL-SCNC: 150 MMOL/L (ref 133–144)
SODIUM SERPL-SCNC: 150 MMOL/L (ref 133–144)
SPECIMEN EXP DATE BLD: NORMAL
SPECIMEN SOURCE: ABNORMAL
SPECIMEN SOURCE: NORMAL
SPECIMEN SOURCE: NORMAL
TACROLIMUS BLD-MCNC: 15.6 UG/L (ref 5–15)
TME LAST DOSE: ABNORMAL H
TRANSFUSION STATUS PATIENT QL: NORMAL
VANCOMYCIN SERPL-MCNC: 20 MG/L
WBC # BLD AUTO: 14.2 10E9/L (ref 4–11)
WBC # BLD AUTO: 17.6 10E9/L (ref 4–11)
WBC # BLD AUTO: 19.4 10E9/L (ref 4–11)
WBC # BLD AUTO: 20.8 10E9/L (ref 4–11)
WBC # BLD AUTO: 22.4 10E9/L (ref 4–11)

## 2018-03-03 PROCEDURE — 99291 CRITICAL CARE FIRST HOUR: CPT | Mod: GC | Performed by: ANESTHESIOLOGY

## 2018-03-03 PROCEDURE — P9037 PLATE PHERES LEUKOREDU IRRAD: HCPCS | Performed by: SURGERY

## 2018-03-03 PROCEDURE — 85730 THROMBOPLASTIN TIME PARTIAL: CPT | Performed by: ANESTHESIOLOGY

## 2018-03-03 PROCEDURE — 85384 FIBRINOGEN ACTIVITY: CPT | Performed by: THORACIC SURGERY (CARDIOTHORACIC VASCULAR SURGERY)

## 2018-03-03 PROCEDURE — 80076 HEPATIC FUNCTION PANEL: CPT | Performed by: SURGERY

## 2018-03-03 PROCEDURE — 25000128 H RX IP 250 OP 636: Performed by: INTERNAL MEDICINE

## 2018-03-03 PROCEDURE — 25000132 ZZH RX MED GY IP 250 OP 250 PS 637: Performed by: PHYSICIAN ASSISTANT

## 2018-03-03 PROCEDURE — 25000128 H RX IP 250 OP 636: Performed by: SURGERY

## 2018-03-03 PROCEDURE — 94640 AIRWAY INHALATION TREATMENT: CPT

## 2018-03-03 PROCEDURE — 83605 ASSAY OF LACTIC ACID: CPT | Performed by: THORACIC SURGERY (CARDIOTHORACIC VASCULAR SURGERY)

## 2018-03-03 PROCEDURE — 25000128 H RX IP 250 OP 636: Performed by: STUDENT IN AN ORGANIZED HEALTH CARE EDUCATION/TRAINING PROGRAM

## 2018-03-03 PROCEDURE — 85384 FIBRINOGEN ACTIVITY: CPT | Performed by: SURGERY

## 2018-03-03 PROCEDURE — 85379 FIBRIN DEGRADATION QUANT: CPT | Performed by: THORACIC SURGERY (CARDIOTHORACIC VASCULAR SURGERY)

## 2018-03-03 PROCEDURE — 82947 ASSAY GLUCOSE BLOOD QUANT: CPT

## 2018-03-03 PROCEDURE — P9037 PLATE PHERES LEUKOREDU IRRAD: HCPCS | Performed by: ANESTHESIOLOGY

## 2018-03-03 PROCEDURE — 40000344 ZZHCL STATISTIC THAWING COMPONENT: Performed by: ANESTHESIOLOGY

## 2018-03-03 PROCEDURE — 85610 PROTHROMBIN TIME: CPT | Performed by: ANESTHESIOLOGY

## 2018-03-03 PROCEDURE — 83051 HEMOGLOBIN PLASMA: CPT | Performed by: SURGERY

## 2018-03-03 PROCEDURE — 85027 COMPLETE CBC AUTOMATED: CPT | Performed by: THORACIC SURGERY (CARDIOTHORACIC VASCULAR SURGERY)

## 2018-03-03 PROCEDURE — 85300 ANTITHROMBIN III ACTIVITY: CPT | Performed by: SURGERY

## 2018-03-03 PROCEDURE — 40000985 XR CHEST PORT 1 VW

## 2018-03-03 PROCEDURE — 82803 BLOOD GASES ANY COMBINATION: CPT | Performed by: SURGERY

## 2018-03-03 PROCEDURE — 85049 AUTOMATED PLATELET COUNT: CPT | Performed by: SURGERY

## 2018-03-03 PROCEDURE — 25000128 H RX IP 250 OP 636: Performed by: PHYSICIAN ASSISTANT

## 2018-03-03 PROCEDURE — 25000125 ZZHC RX 250: Performed by: THORACIC SURGERY (CARDIOTHORACIC VASCULAR SURGERY)

## 2018-03-03 PROCEDURE — 25000128 H RX IP 250 OP 636: Performed by: NURSE ANESTHETIST, CERTIFIED REGISTERED

## 2018-03-03 PROCEDURE — 71045 X-RAY EXAM CHEST 1 VIEW: CPT

## 2018-03-03 PROCEDURE — 85730 THROMBOPLASTIN TIME PARTIAL: CPT | Performed by: SURGERY

## 2018-03-03 PROCEDURE — 87040 BLOOD CULTURE FOR BACTERIA: CPT | Performed by: STUDENT IN AN ORGANIZED HEALTH CARE EDUCATION/TRAINING PROGRAM

## 2018-03-03 PROCEDURE — 25000128 H RX IP 250 OP 636

## 2018-03-03 PROCEDURE — 40000275 ZZH STATISTIC RCP TIME EA 10 MIN

## 2018-03-03 PROCEDURE — 80048 BASIC METABOLIC PNL TOTAL CA: CPT | Performed by: THORACIC SURGERY (CARDIOTHORACIC VASCULAR SURGERY)

## 2018-03-03 PROCEDURE — 83735 ASSAY OF MAGNESIUM: CPT | Performed by: THORACIC SURGERY (CARDIOTHORACIC VASCULAR SURGERY)

## 2018-03-03 PROCEDURE — 36000074 ZZH SURGERY LEVEL 6 1ST 30 MIN - UMMC: Performed by: SURGERY

## 2018-03-03 PROCEDURE — P9037 PLATE PHERES LEUKOREDU IRRAD: HCPCS | Performed by: THORACIC SURGERY (CARDIOTHORACIC VASCULAR SURGERY)

## 2018-03-03 PROCEDURE — 83605 ASSAY OF LACTIC ACID: CPT

## 2018-03-03 PROCEDURE — 82330 ASSAY OF CALCIUM: CPT | Performed by: ANESTHESIOLOGY

## 2018-03-03 PROCEDURE — 25000132 ZZH RX MED GY IP 250 OP 250 PS 637

## 2018-03-03 PROCEDURE — 84295 ASSAY OF SERUM SODIUM: CPT | Performed by: ANESTHESIOLOGY

## 2018-03-03 PROCEDURE — 27210995 ZZH RX 272: Performed by: STUDENT IN AN ORGANIZED HEALTH CARE EDUCATION/TRAINING PROGRAM

## 2018-03-03 PROCEDURE — 80197 ASSAY OF TACROLIMUS: CPT | Performed by: THORACIC SURGERY (CARDIOTHORACIC VASCULAR SURGERY)

## 2018-03-03 PROCEDURE — 37000008 ZZH ANESTHESIA TECHNICAL FEE, 1ST 30 MIN: Performed by: SURGERY

## 2018-03-03 PROCEDURE — 85025 COMPLETE CBC W/AUTO DIFF WBC: CPT | Performed by: SURGERY

## 2018-03-03 PROCEDURE — 02HP32Z INSERTION OF MONITORING DEVICE INTO PULMONARY TRUNK, PERCUTANEOUS APPROACH: ICD-10-PCS | Performed by: SURGERY

## 2018-03-03 PROCEDURE — 25000128 H RX IP 250 OP 636: Performed by: THORACIC SURGERY (CARDIOTHORACIC VASCULAR SURGERY)

## 2018-03-03 PROCEDURE — P9059 PLASMA, FRZ BETWEEN 8-24HOUR: HCPCS | Performed by: SURGERY

## 2018-03-03 PROCEDURE — 37000009 ZZH ANESTHESIA TECHNICAL FEE, EACH ADDTL 15 MIN: Performed by: SURGERY

## 2018-03-03 PROCEDURE — 83735 ASSAY OF MAGNESIUM: CPT | Performed by: ANESTHESIOLOGY

## 2018-03-03 PROCEDURE — 85027 COMPLETE CBC AUTOMATED: CPT | Performed by: SURGERY

## 2018-03-03 PROCEDURE — 82330 ASSAY OF CALCIUM: CPT

## 2018-03-03 PROCEDURE — 85027 COMPLETE CBC AUTOMATED: CPT | Performed by: ANESTHESIOLOGY

## 2018-03-03 PROCEDURE — 40000196 ZZH STATISTIC RAPCV CVP MONITORING

## 2018-03-03 PROCEDURE — 80048 BASIC METABOLIC PNL TOTAL CA: CPT | Performed by: SURGERY

## 2018-03-03 PROCEDURE — 25000125 ZZHC RX 250: Performed by: NURSE ANESTHETIST, CERTIFIED REGISTERED

## 2018-03-03 PROCEDURE — 25000132 ZZH RX MED GY IP 250 OP 250 PS 637: Performed by: THORACIC SURGERY (CARDIOTHORACIC VASCULAR SURGERY)

## 2018-03-03 PROCEDURE — 82803 BLOOD GASES ANY COMBINATION: CPT | Performed by: THORACIC SURGERY (CARDIOTHORACIC VASCULAR SURGERY)

## 2018-03-03 PROCEDURE — 82803 BLOOD GASES ANY COMBINATION: CPT

## 2018-03-03 PROCEDURE — 82805 BLOOD GASES W/O2 SATURATION: CPT | Performed by: SURGERY

## 2018-03-03 PROCEDURE — 85520 HEPARIN ASSAY: CPT | Performed by: SURGERY

## 2018-03-03 PROCEDURE — 80202 ASSAY OF VANCOMYCIN: CPT | Performed by: SURGERY

## 2018-03-03 PROCEDURE — 85520 HEPARIN ASSAY: CPT | Performed by: THORACIC SURGERY (CARDIOTHORACIC VASCULAR SURGERY)

## 2018-03-03 PROCEDURE — 85347 COAGULATION TIME ACTIVATED: CPT

## 2018-03-03 PROCEDURE — 41000018 ZZH PER-PERFUSION 1ST 30 MIN: Performed by: SURGERY

## 2018-03-03 PROCEDURE — 85379 FIBRIN DEGRADATION QUANT: CPT | Performed by: SURGERY

## 2018-03-03 PROCEDURE — 82947 ASSAY GLUCOSE BLOOD QUANT: CPT | Performed by: ANESTHESIOLOGY

## 2018-03-03 PROCEDURE — 40000014 ZZH STATISTIC ARTERIAL MONITORING DAILY

## 2018-03-03 PROCEDURE — 85730 THROMBOPLASTIN TIME PARTIAL: CPT | Performed by: THORACIC SURGERY (CARDIOTHORACIC VASCULAR SURGERY)

## 2018-03-03 PROCEDURE — 84100 ASSAY OF PHOSPHORUS: CPT | Performed by: SURGERY

## 2018-03-03 PROCEDURE — 82810 BLOOD GASES O2 SAT ONLY: CPT | Performed by: SURGERY

## 2018-03-03 PROCEDURE — 25000131 ZZH RX MED GY IP 250 OP 636 PS 637: Performed by: THORACIC SURGERY (CARDIOTHORACIC VASCULAR SURGERY)

## 2018-03-03 PROCEDURE — P9016 RBC LEUKOCYTES REDUCED: HCPCS | Performed by: STUDENT IN AN ORGANIZED HEALTH CARE EDUCATION/TRAINING PROGRAM

## 2018-03-03 PROCEDURE — 36000076 ZZH SURGERY LEVEL 6 EA 15 ADDTL MIN - UMMC: Performed by: SURGERY

## 2018-03-03 PROCEDURE — 4A1239Z MONITORING OF CARDIAC OUTPUT, PERCUTANEOUS APPROACH: ICD-10-PCS | Performed by: SURGERY

## 2018-03-03 PROCEDURE — 25000565 ZZH ISOFLURANE, EA 15 MIN: Performed by: SURGERY

## 2018-03-03 PROCEDURE — 83605 ASSAY OF LACTIC ACID: CPT | Performed by: ANESTHESIOLOGY

## 2018-03-03 PROCEDURE — 82803 BLOOD GASES ANY COMBINATION: CPT | Performed by: ANESTHESIOLOGY

## 2018-03-03 PROCEDURE — 84132 ASSAY OF SERUM POTASSIUM: CPT | Performed by: ANESTHESIOLOGY

## 2018-03-03 PROCEDURE — 4A133B3 MONITORING OF ARTERIAL PRESSURE, PULMONARY, PERCUTANEOUS APPROACH: ICD-10-PCS | Performed by: SURGERY

## 2018-03-03 PROCEDURE — 80048 BASIC METABOLIC PNL TOTAL CA: CPT | Performed by: ANESTHESIOLOGY

## 2018-03-03 PROCEDURE — 94645 CONT INHLJ TX EACH ADDL HOUR: CPT

## 2018-03-03 PROCEDURE — 82805 BLOOD GASES W/O2 SATURATION: CPT | Performed by: ANESTHESIOLOGY

## 2018-03-03 PROCEDURE — 40000344 ZZHCL STATISTIC THAWING COMPONENT: Performed by: SURGERY

## 2018-03-03 PROCEDURE — 82805 BLOOD GASES W/O2 SATURATION: CPT | Performed by: THORACIC SURGERY (CARDIOTHORACIC VASCULAR SURGERY)

## 2018-03-03 PROCEDURE — P9059 PLASMA, FRZ BETWEEN 8-24HOUR: HCPCS | Performed by: ANESTHESIOLOGY

## 2018-03-03 PROCEDURE — P9041 ALBUMIN (HUMAN),5%, 50ML: HCPCS

## 2018-03-03 PROCEDURE — 20000004 ZZH R&B ICU UMMC

## 2018-03-03 PROCEDURE — 36415 COLL VENOUS BLD VENIPUNCTURE: CPT | Performed by: STUDENT IN AN ORGANIZED HEALTH CARE EDUCATION/TRAINING PROGRAM

## 2018-03-03 PROCEDURE — 27210794 ZZH OR GENERAL SUPPLY STERILE: Performed by: SURGERY

## 2018-03-03 PROCEDURE — 25000125 ZZHC RX 250: Performed by: SURGERY

## 2018-03-03 PROCEDURE — 00000146 ZZHCL STATISTIC GLUCOSE BY METER IP

## 2018-03-03 PROCEDURE — 82810 BLOOD GASES O2 SAT ONLY: CPT | Performed by: THORACIC SURGERY (CARDIOTHORACIC VASCULAR SURGERY)

## 2018-03-03 PROCEDURE — 83735 ASSAY OF MAGNESIUM: CPT | Performed by: SURGERY

## 2018-03-03 PROCEDURE — 33949 ECMO/ECLS DAILY MGMT ARTERY: CPT

## 2018-03-03 PROCEDURE — 85384 FIBRINOGEN ACTIVITY: CPT | Performed by: ANESTHESIOLOGY

## 2018-03-03 PROCEDURE — 84132 ASSAY OF SERUM POTASSIUM: CPT

## 2018-03-03 PROCEDURE — 84295 ASSAY OF SERUM SODIUM: CPT

## 2018-03-03 PROCEDURE — 82330 ASSAY OF CALCIUM: CPT | Performed by: THORACIC SURGERY (CARDIOTHORACIC VASCULAR SURGERY)

## 2018-03-03 PROCEDURE — 85610 PROTHROMBIN TIME: CPT | Performed by: THORACIC SURGERY (CARDIOTHORACIC VASCULAR SURGERY)

## 2018-03-03 PROCEDURE — 94003 VENT MGMT INPAT SUBQ DAY: CPT

## 2018-03-03 PROCEDURE — 85610 PROTHROMBIN TIME: CPT | Performed by: SURGERY

## 2018-03-03 RX ORDER — HEPARIN SODIUM 1000 [USP'U]/ML
INJECTION, SOLUTION INTRAVENOUS; SUBCUTANEOUS PRN
Status: DISCONTINUED | OUTPATIENT
Start: 2018-03-03 | End: 2018-03-03 | Stop reason: HOSPADM

## 2018-03-03 RX ORDER — PROPOFOL 10 MG/ML
INJECTION, EMULSION INTRAVENOUS CONTINUOUS PRN
Status: DISCONTINUED | OUTPATIENT
Start: 2018-03-03 | End: 2018-03-03

## 2018-03-03 RX ORDER — SODIUM CHLORIDE, SODIUM LACTATE, POTASSIUM CHLORIDE, CALCIUM CHLORIDE 600; 310; 30; 20 MG/100ML; MG/100ML; MG/100ML; MG/100ML
INJECTION, SOLUTION INTRAVENOUS CONTINUOUS PRN
Status: DISCONTINUED | OUTPATIENT
Start: 2018-03-03 | End: 2018-03-03

## 2018-03-03 RX ORDER — MILRINONE LACTATE 0.2 MG/ML
INJECTION, SOLUTION INTRAVENOUS CONTINUOUS PRN
Status: DISCONTINUED | OUTPATIENT
Start: 2018-03-03 | End: 2018-03-03

## 2018-03-03 RX ORDER — ALBUTEROL SULFATE 90 UG/1
6 AEROSOL, METERED RESPIRATORY (INHALATION)
Status: DISCONTINUED | OUTPATIENT
Start: 2018-03-03 | End: 2018-03-05

## 2018-03-03 RX ORDER — PIPERACILLIN SODIUM, TAZOBACTAM SODIUM 2; .25 G/10ML; G/10ML
2.25 INJECTION, POWDER, LYOPHILIZED, FOR SOLUTION INTRAVENOUS EVERY 6 HOURS
Status: DISCONTINUED | OUTPATIENT
Start: 2018-03-03 | End: 2018-03-04

## 2018-03-03 RX ORDER — SODIUM CHLORIDE 9 MG/ML
INJECTION, SOLUTION INTRAVENOUS CONTINUOUS PRN
Status: DISCONTINUED | OUTPATIENT
Start: 2018-03-03 | End: 2018-03-03

## 2018-03-03 RX ORDER — CALCIUM CHLORIDE 100 MG/ML
INJECTION INTRAVENOUS; INTRAVENTRICULAR PRN
Status: DISCONTINUED | OUTPATIENT
Start: 2018-03-03 | End: 2018-03-03

## 2018-03-03 RX ORDER — PROTAMINE SULFATE 10 MG/ML
INJECTION, SOLUTION INTRAVENOUS PRN
Status: DISCONTINUED | OUTPATIENT
Start: 2018-03-03 | End: 2018-03-03

## 2018-03-03 RX ORDER — ALBUMIN, HUMAN INJ 5% 5 %
12.5 SOLUTION INTRAVENOUS ONCE
Status: COMPLETED | OUTPATIENT
Start: 2018-03-03 | End: 2018-03-03

## 2018-03-03 RX ORDER — ALBUMIN, HUMAN INJ 5% 5 %
SOLUTION INTRAVENOUS
Status: COMPLETED
Start: 2018-03-03 | End: 2018-03-03

## 2018-03-03 RX ORDER — MAGNESIUM SULFATE HEPTAHYDRATE 40 MG/ML
INJECTION, SOLUTION INTRAVENOUS PRN
Status: DISCONTINUED | OUTPATIENT
Start: 2018-03-03 | End: 2018-03-03

## 2018-03-03 RX ADMIN — DOCUSATE SODIUM 100 MG: 50 LIQUID ORAL at 20:06

## 2018-03-03 RX ADMIN — ALBUMIN HUMAN 12.5 G: 0.05 INJECTION, SOLUTION INTRAVENOUS at 12:30

## 2018-03-03 RX ADMIN — DOCUSATE SODIUM 100 MG: 50 LIQUID ORAL at 07:21

## 2018-03-03 RX ADMIN — EPOPROSTENOL 20 NG/KG/MIN: 1.5 INJECTION, POWDER, LYOPHILIZED, FOR SOLUTION INTRAVENOUS at 03:13

## 2018-03-03 RX ADMIN — MINERAL OIL AND WHITE PETROLATUM: 150; 830 OINTMENT OPHTHALMIC at 22:23

## 2018-03-03 RX ADMIN — SODIUM CHLORIDE: 9 INJECTION, SOLUTION INTRAVENOUS at 08:47

## 2018-03-03 RX ADMIN — PIPERACILLIN AND TAZOBACTAM 3.38 G: 3; .375 INJECTION, POWDER, LYOPHILIZED, FOR SOLUTION INTRAVENOUS; PARENTERAL at 04:03

## 2018-03-03 RX ADMIN — CALCIUM CHLORIDE 300 MG: 100 INJECTION, SOLUTION INTRAVENOUS at 10:51

## 2018-03-03 RX ADMIN — ALBUMIN HUMAN 12.5 G: 50 SOLUTION INTRAVENOUS at 12:30

## 2018-03-03 RX ADMIN — PIPERACILLIN AND TAZOBACTAM 2.25 G: 2; .25 INJECTION, POWDER, LYOPHILIZED, FOR SOLUTION INTRAVENOUS; PARENTERAL at 09:45

## 2018-03-03 RX ADMIN — PROPOFOL 25 MCG/KG/MIN: 10 INJECTION, EMULSION INTRAVENOUS at 08:47

## 2018-03-03 RX ADMIN — MYCOPHENOLATE MOFETIL 1500 MG: 200 POWDER, FOR SUSPENSION ORAL at 07:21

## 2018-03-03 RX ADMIN — FENTANYL CITRATE 50 MCG/HR: 50 INJECTION, SOLUTION INTRAMUSCULAR; INTRAVENOUS at 14:59

## 2018-03-03 RX ADMIN — TACROLIMUS 15 MCG/HR: 5 INJECTION, SOLUTION INTRAVENOUS at 23:44

## 2018-03-03 RX ADMIN — NYSTATIN 1000000 UNITS: 500000 SUSPENSION ORAL at 17:00

## 2018-03-03 RX ADMIN — CALCIUM CHLORIDE 400 MG: 100 INJECTION, SOLUTION INTRAVENOUS at 11:02

## 2018-03-03 RX ADMIN — PIPERACILLIN AND TAZOBACTAM 2.25 G: 2; .25 INJECTION, POWDER, LYOPHILIZED, FOR SOLUTION INTRAVENOUS; PARENTERAL at 22:23

## 2018-03-03 RX ADMIN — PROPOFOL 25 MCG/KG/MIN: 10 INJECTION, EMULSION INTRAVENOUS at 00:53

## 2018-03-03 RX ADMIN — NICARDIPINE HYDROCHLORIDE 2.5 MG/HR: 0.2 INJECTION, SOLUTION INTRAVENOUS at 11:20

## 2018-03-03 RX ADMIN — ALBUTEROL SULFATE 6 PUFF: 90 AEROSOL, METERED RESPIRATORY (INHALATION) at 20:15

## 2018-03-03 RX ADMIN — HUMAN INSULIN 1 UNITS/HR: 100 INJECTION, SOLUTION SUBCUTANEOUS at 23:44

## 2018-03-03 RX ADMIN — PROPOFOL 15 MCG/KG/MIN: 10 INJECTION, EMULSION INTRAVENOUS at 16:39

## 2018-03-03 RX ADMIN — EPINEPHRINE 0.05 MCG/KG/MIN: 1 INJECTION PARENTERAL at 23:45

## 2018-03-03 RX ADMIN — EPOPROSTENOL 20 NG/KG/MIN: 1.5 INJECTION, POWDER, LYOPHILIZED, FOR SOLUTION INTRAVENOUS at 19:41

## 2018-03-03 RX ADMIN — PREDNISOLONE SODIUM PHOSPHATE 12.9 MG: 15 SOLUTION ORAL at 02:56

## 2018-03-03 RX ADMIN — PREDNISOLONE SODIUM PHOSPHATE 12.9 MG: 15 SOLUTION ORAL at 20:07

## 2018-03-03 RX ADMIN — SODIUM BICARBONATE 50 MEQ: 84 INJECTION, SOLUTION INTRAVENOUS at 09:37

## 2018-03-03 RX ADMIN — VASOPRESSIN 0.5 UNITS: 20 INJECTION, SOLUTION INTRAMUSCULAR; SUBCUTANEOUS at 10:56

## 2018-03-03 RX ADMIN — SODIUM CHLORIDE, POTASSIUM CHLORIDE, SODIUM LACTATE AND CALCIUM CHLORIDE: 600; 310; 30; 20 INJECTION, SOLUTION INTRAVENOUS at 11:05

## 2018-03-03 RX ADMIN — MULTIVITAMIN 15 ML: LIQUID ORAL at 07:20

## 2018-03-03 RX ADMIN — ACETAMINOPHEN 975 MG: 325 TABLET, FILM COATED ORAL at 20:11

## 2018-03-03 RX ADMIN — ROCURONIUM BROMIDE 30 MG: 10 INJECTION INTRAVENOUS at 09:58

## 2018-03-03 RX ADMIN — VASOPRESSIN 1 UNITS/HR: 20 INJECTION, SOLUTION INTRAMUSCULAR; SUBCUTANEOUS at 03:13

## 2018-03-03 RX ADMIN — PROTAMINE SULFATE 90 MG: 10 INJECTION, SOLUTION INTRAVENOUS at 10:08

## 2018-03-03 RX ADMIN — PROTAMINE SULFATE 10 MG: 10 INJECTION, SOLUTION INTRAVENOUS at 10:06

## 2018-03-03 RX ADMIN — NYSTATIN 1000000 UNITS: 500000 SUSPENSION ORAL at 20:06

## 2018-03-03 RX ADMIN — EPINEPHRINE 0.06 MCG/KG/MIN: 1 INJECTION PARENTERAL at 04:06

## 2018-03-03 RX ADMIN — ALBUTEROL SULFATE 6 PUFF: 90 AEROSOL, METERED RESPIRATORY (INHALATION) at 17:20

## 2018-03-03 RX ADMIN — PIPERACILLIN AND TAZOBACTAM 2.25 G: 2; .25 INJECTION, POWDER, LYOPHILIZED, FOR SOLUTION INTRAVENOUS; PARENTERAL at 16:46

## 2018-03-03 RX ADMIN — MYCOPHENOLATE MOFETIL 1500 MG: 200 POWDER, FOR SUSPENSION ORAL at 18:34

## 2018-03-03 RX ADMIN — MINERAL OIL AND WHITE PETROLATUM: 150; 830 OINTMENT OPHTHALMIC at 04:02

## 2018-03-03 RX ADMIN — MINERAL OIL AND WHITE PETROLATUM: 150; 830 OINTMENT OPHTHALMIC at 17:00

## 2018-03-03 RX ADMIN — PREDNISOLONE SODIUM PHOSPHATE 12.9 MG: 15 SOLUTION ORAL at 07:20

## 2018-03-03 RX ADMIN — HEPARIN SODIUM 1700 UNITS/HR: 10000 INJECTION, SOLUTION INTRAVENOUS at 00:50

## 2018-03-03 RX ADMIN — SULFAMETHOXAZOLE AND TRIMETHOPRIM 80 MG: 200; 40 SUSPENSION ORAL at 07:22

## 2018-03-03 RX ADMIN — ACETAMINOPHEN 975 MG: 325 TABLET, FILM COATED ORAL at 04:03

## 2018-03-03 RX ADMIN — ROCURONIUM BROMIDE 70 MG: 10 INJECTION INTRAVENOUS at 08:54

## 2018-03-03 RX ADMIN — MILRINONE LACTATE IN DEXTROSE 0.25 MCG/KG/MIN: 200 INJECTION, SOLUTION INTRAVENOUS at 10:25

## 2018-03-03 RX ADMIN — CALCIUM CHLORIDE 300 MG: 100 INJECTION, SOLUTION INTRAVENOUS at 10:59

## 2018-03-03 RX ADMIN — MIDAZOLAM 2 MG: 1 INJECTION INTRAMUSCULAR; INTRAVENOUS at 08:47

## 2018-03-03 RX ADMIN — PANTOPRAZOLE SODIUM 40 MG: 40 INJECTION, POWDER, FOR SOLUTION INTRAVENOUS at 07:20

## 2018-03-03 RX ADMIN — NYSTATIN 1000000 UNITS: 500000 SUSPENSION ORAL at 07:21

## 2018-03-03 RX ADMIN — VASOPRESSIN 0.5 UNITS: 20 INJECTION, SOLUTION INTRAMUSCULAR; SUBCUTANEOUS at 08:56

## 2018-03-03 RX ADMIN — HUMAN INSULIN 2.5 UNITS/HR: 100 INJECTION, SOLUTION SUBCUTANEOUS at 06:14

## 2018-03-03 RX ADMIN — SODIUM CHLORIDE, POTASSIUM CHLORIDE, SODIUM LACTATE AND CALCIUM CHLORIDE: 600; 310; 30; 20 INJECTION, SOLUTION INTRAVENOUS at 10:10

## 2018-03-03 RX ADMIN — SODIUM BICARBONATE 50 MEQ: 84 INJECTION, SOLUTION INTRAVENOUS at 09:35

## 2018-03-03 RX ADMIN — SODIUM CHLORIDE: 9 INJECTION, SOLUTION INTRAVENOUS at 09:50

## 2018-03-03 RX ADMIN — ACETAMINOPHEN 975 MG: 325 TABLET, FILM COATED ORAL at 13:24

## 2018-03-03 RX ADMIN — MIDAZOLAM 2 MG: 1 INJECTION INTRAMUSCULAR; INTRAVENOUS at 11:34

## 2018-03-03 RX ADMIN — Medication 1 PACKET: at 20:21

## 2018-03-03 RX ADMIN — MAGNESIUM SULFATE IN WATER 2 G: 40 INJECTION, SOLUTION INTRAVENOUS at 10:56

## 2018-03-03 NOTE — PROGRESS NOTES
Pt arrived from OR alreasy intubated.  Pt placed on CMV 16, Vt 400, FiO2 .8, PEEP +10.  BBS clear t/o.  ABG pending.

## 2018-03-03 NOTE — ANESTHESIA PROCEDURE NOTES
PA Catheter Insertion Note  Anesthesiologist: SHELBY SUBRAMANIAN      Introducer: Introducer already in place.   Skin prep:  Chloraprep Cap, Full body drape, hand hygiene, Mask, Sterile gloves and Sterile gown    PA Catheter type:  CCO    Distance catheter advanced:  55 cm.    Appropriate RA, RV, PA  waveforms?: Yes    Dressing:  Biopatch and Tegaderm    Complications:  None apparent

## 2018-03-03 NOTE — ANESTHESIA PROCEDURE NOTES
Arterial Line Procedure Note  Staff:     Anesthesiologist:  SHELBY SUBRAMANIAN  Location: In OR After Induction  Procedure Start/Stop Times:     patient identified, IV checked, site marked, risks and benefits discussed, informed consent, monitors and equipment checked, pre-op evaluation and at physician/surgeon's request      Correct Patient: Yes      Correct Position: Yes      Correct Site: Yes      Correct Procedure: Yes      Correct Laterality:  N/A    Site Marked:  N/A  Line Placement:     Procedure:  Arterial Line    Insertion Site:  Brachial    Insertion laterality:  Right    Skin Prep: Chloraprep      Patient Prep: patient draped, mask, sterile gloves, sterile gown, hat and hand hygiene      Local skin infiltration:  None    Ultrasound Guided?: Yes      Artery evaluated via ultrasound confirming patency.   Using realtime imaging, the artery was punctured and the needle was observed entering the artery.      A permanent image is NOT entered into the patient's record.      Catheter size:  20 gauge, 12 cm    Cath secured with: suture      Dressing:  Tegaderm    Complications:  None obvious    Arterial waveform: Yes      IBP within 10% of NIBP: Yes

## 2018-03-03 NOTE — PLAN OF CARE
Problem: Patient Care Overview  Goal: Plan of Care/Patient Progress Review  This am patient began to chug at about 0830, 250cc albumin given, resolved. At 0900 decreased flolan from 20ng to 10ng per Dr Whittaker. Next ABG showed lower PO2 and Fi02 was increased, CVP stable. At about noon pt acutely desated, SvO2 < 40 and pt placed back on 20ng and FiO2 increased to 100% on ECMO. Stat CXR and ECMO done at bedside. Pt stable until she desated again at 1500. MDs to bedside, Peep increased to 10 and Fi02 on vent set to 100% and flow decreased to 1.5LPM per Dr Ackerman and ABG sent 1 hour later. ABG improved, flow decreased to 1LPM per Dr Ackerman and ABG sent 1 hour later. PaO2 increased again but CO2 to low and vent changes made. Rate 10 and 80% FiO2. Flow increased to 2LMP per Dr Ackerman to decrease chance of clotting and earlier ACT goal increased to 200. ABG still good 1 hour later and FiO2 decreased to 60% on vent. Will follow ABGs.  Total of 4 units PRBC, 1 platelet, and 250 cc albumin given this shift. Chest tube output increased  To about 100cc/h after stripping out clots, and pt dumped 300 with one turn. 4pm coags sent early and 1 unit platelets given. Blood was darker during the dump, and decreased quickly. Urine output minimal, MD kept aware. 10-45 cc/h    Plan for OR tomorrow with OMA and attempt to decannulate. Leave ECMO at 2 LPM overnight per Dr Ackerman.

## 2018-03-03 NOTE — PHARMACY-TRANSPLANT NOTE
Adult Lung Transplant Post Operative Note    57 year old female s/p lung transplant on 03/01/2018 for ILD.      Planned immunosuppression regimen to include tacrolimus (goal 10-15 mcg/L), mycophenolate and prednisone.      Opportunistic pathogen prophylaxis includes: trimethoprim/sulfamethoxazole, nystatin and valganciclovir starting POD#8.    Patient is not enrolled in medication study.    Pharmacy will monitor for medication interactions and immunosuppression levels in conjunction with the team. Medication therapy needs for discharge planning will continue to be addressed throughout the current admission via multidisciplinary rounds and order review. Pharmacy will make recommendations as appropriate.    Annita Chin, Jose  CVICU and Advanced Heart Failure Pharmacist  Pager [phone]

## 2018-03-03 NOTE — PROGRESS NOTES
CVICU PROGRESS NOTE  March 3, 2018      ASSESSMENT: Kecia Blue is a 57 year old female with PMH significant for dermatomyositis on immunosuppression, eronegative RA, ILD, and pulmonary hypertension, who was admitted for emergent lung transplant work-up on 2/21 for increasing shortness of breath and hypoxia now POD # 2 s/p emergent [facility] ECMO placement on 2/27/18. Now s/p bilateral lung transplant on 3/1 and [facility] ECMO decannulation on 3/3 with Dr. Hernandez.     TODAY'S PROGRESS/PLAN  - Flolan stable  - BMP q2h, monitor Cr and lytes  - Q2hr ABG, coagulation labs  - Plan for turndown in OR 3/3 with possible decannulation    PLAN:  Neuro/ pain/ sedation:  - Monitor neurological status. Notify the MD for any acute changes in exam.  - Fentanyl gtt for pain.  - Propofol gtt for sedation. (D/c midazolam 3/2)    Pulmonary care:  # Acute on chronic respiratory failure, worsening, s/p bilateral lung transplantation 3/1/2018  # ILD related to dermatomyositis, on transplant list  # Severe pulmonary hypertension (WHO class III)  # Pneumomediastinum, 2/22/2018  # [facility] ECMO decannulated 3/3   - Supplemental oxygen via ventilator to keep saturation above 92 %.  - Flolan 20mg/kg/min, wean as able, monitor CVPs  - Nitric oxide weaned off 3/1    Cardiovascular:  # Right heart failure  # Pulmonary HTN    - Monitor hemodynamic status.   - Most likely 2/2 pulmonary hypertension from ILD, but did not rule out PE - unlikely given hypotension occurred after intubation and did not occur at the same time as her worsening hypoxia.     GI care:   - NPO except ice chips and medications.  - NJ placed  - Trickle feeds    Fluids/ Electrolytes/ Nutrition:   - Periodic fluid bolus for IV fluid hydration  - Parenteral nutrition to start with placement of NG/NJ tube.    Renal/ Fluid Balance:  #RADHA  - Urine output is adequate so far.  - Cr trending up post-op, will continue to monitor, q2h BMP  - Will continue to monitor intake and  output.    Endocrine:    - Insulin gtt     ID/ Antibiotics:  - Anti-rejection and immunosuppressant medications per transplant team    Heme:     - Hemoglobin stable, trending down, Chest tube output has decreased will transfuse for Hgb less than 7.   - q2h Hgb     Prophylaxis:    - Mechanical prophylaxis for DVT.   - PPI    Lines/ tubes/ drains:  - LIJ MAC/swan, right brachial artery, femoral arterial line, PIV, ETT, Basilio, Chest tube    Disposition:  - CVICU.   - Plan for OR 3/3 for turndown and VA ECMO decannulation    D/w CVICU attending, Dr. Aguilar.    Kwesi Schwartz MD  Anesthesiology Resident CA2, PGY3      ====================================    TODAY'S PROGRESS:   SUBJECTIVE:   - Lung transplant overnight, received 3 PRBCs, 1 FFP, one platelet s/p OR  - High chest tube output overnight  - To OR for [facility] ECMO decannulation 3/3    OBJECTIVE:   1. VITAL SIGNS:   Temp:  [97  F (36.1  C)-98.1  F (36.7  C)] 97.9  F (36.6  C)  Heart Rate:  [] 100  Resp:  [10-15] 10  MAP:  [62 mmHg-93 mmHg] 76 mmHg  Arterial Line BP: ()/(58-85) 88/70  FiO2 (%):  [40 %-100 %] 60 %  SpO2:  [87 %-100 %] 100 %  Ventilation Mode: CMV/AC  (Continuous Mandatory Ventilation/ Assist Control)  FiO2 (%): 60 %  Rate Set (breaths/minute): 10 breaths/min  Tidal Volume Set (mL): 400 mL  PEEP (cm H2O): 10 cmH2O  Oxygen Concentration (%): 60 %  Resp: 10    2. INTAKE/ OUTPUT:   I/O last 3 completed shifts:  In: 6802.39 [I.V.:2929.39; NG/GT:640]  Out: 3505 [Urine:465; Chest Tube:3040]    3. PHYSICAL EXAMINATION:   General: Sedated, NAD  Neuro: Sedated, moving mouth with oral cares, NAD. Moving all four limbs with weaned sedation on return from OR  Resp: Intubated, increasing lung sounds bilaterally  CV: RRR, No noted m/r/g  Abdomen: Soft, Non-distended  Incisions: c/d/i  Extremities: warm and well perfused    4. INVESTIGATIONS:   Arterial Blood Gases     Recent Labs  Lab 03/03/18  0600 03/03/18  0455 03/03/18  0329 03/03/18  0206   PH 7.48*  7.47* 7.35 7.37   PCO2 27* 26* 36 34*   PO2 274* 284* 105 98   HCO3 20* 19* 19* 20*     Complete Blood Count     Recent Labs  Lab 03/03/18 0329 03/02/18 2142 03/02/18 1450 03/02/18  1021   WBC 14.2* 11.9* 10.6 8.8   HGB 10.0* 8.5* 10.2* 8.0*   PLT 90* 102* 86* 100*     Basic Metabolic Panel    Recent Labs  Lab 03/03/18 0329 03/02/18 2142 03/02/18 1450 03/02/18  1021   * 148* 145* 148*   POTASSIUM 4.2 3.8 4.0 4.0   CHLORIDE 117* 115* 117* 119*   CO2 20 20 22 22   BUN 53* 49* 44* 39*   CR 1.80* 1.68* 1.43* 1.42*   * 135* 197* 116*     Liver Function Tests    Recent Labs  Lab 03/03/18 0329 03/02/18 2142 03/02/18 1450 03/02/18  1021 03/02/18  0330 03/01/18 2017 03/01/18  0356   AST 34  --   --   --   --   --  38  --  58*   ALT 29  --   --   --  38  --  65*  --  172*   ALKPHOS 27*  --   --   --   --   --  35*  --  37*   BILITOTAL 1.5*  --   --   --   --   --  1.2  --  0.7   ALBUMIN 2.4*  --   --   --  2.9*  --  1.7*  --  2.4*   INR 1.36* 1.45* 1.52* 1.62* 1.51*  < > 1.86*  < > 1.51*   < > = values in this interval not displayed.  Pancreatic Enzymes  No lab results found in last 7 days.  Coagulation Profile    Recent Labs  Lab 03/03/18 0329 03/02/18 2142 03/02/18 1450 03/02/18  1021   INR 1.36* 1.45* 1.52* 1.62*   PTT >240* >240* 159* 141*     Lactate  Invalid input(s): LACTATE    5. RADIOLOGY:   Recent Results (from the past 24 hour(s))   XR Abdomen Port 1 View    Narrative    Exam: XR ABDOMEN PORT 1 VW  3/2/2018 10:16 AM      History: post pyloric tube;     Comparison: Radiograph 2/28/2018    Findings: Feeding tube tip now projects over the distal second part of  the duodenum. Enteric tube tip projects over the stomach. Partially  visualized chest support devices, and ECMO cannula. Visualized bowel  is nondistended. No pneumatosis.      Impression    Impression: Feeding tube tip is now post pyloric, within the second  part of the duodenum.    I have personally reviewed the examination and  initial interpretation  and I agree with the findings.    JAMES LAI MD   XR Chest Port 1 View    Narrative    Exam: XR CHEST PORT 1 VW, 3/2/2018 2:01 PM    Indication: acute desaturation;     Comparison: Chest x-ray 3/2/2018    Findings:   Endotracheal tube tip projects over the midthoracic trachea. Right IJ  catheter tip projects over the low SVC. Left-sided central venous  catheter tip projects over the right atrium. Stable positioning of the  inferior ECMO cannula which projects over the right atrium. Bibasilar  and biapical chest tubes. Additional chest tube in the left lateral  chest. Mediastinal drain is unchanged. Esophageal temperature probe  projects over the mid esophagus. Gastric tube sidehole projecting over  the stomach. Enteric tube courses below the field of view.    Heart is normal in size. Relatively stable dense left cardiac  opacities and perihilar interstitial opacities. No pleural effusion or  pneumothorax. Decreased subcutaneous gas in the right neck.      Impression    Impression:   1. Stable support devices.  2. Unchanged perihilar interstitial opacities. Differential includes  pulmonary edema versus infection.  3. Unchanged retrocardiac atelectasis versus consolidation.    I have personally reviewed the examination and initial interpretation  and I agree with the findings.    ADAM GONZALEZ MD       =========================================

## 2018-03-03 NOTE — ANESTHESIA POSTPROCEDURE EVALUATION
Patient: Kecia Blue    Procedure(s):  Removal of Extracorporeal Membrane Oxygenator - Wound Class: I-Clean    Diagnosis:Post lung transplant  Diagnosis Additional Information: No value filed.    Anesthesia Type:  General, ETT    Note:  Anesthesia Post Evaluation    Patient location during evaluation: ICU  Patient participation: Unable to evaluate secondary to administered sedation  Level of consciousness: obtunded/minimal responses  Pain management: unable to assess  Airway patency: patent  Cardiovascular status: acceptable  Respiratory status: acceptable, ETT and ventilator  Hydration status: acceptable  PONV: unable to assess     Anesthetic complications: None        Transported to ICU on full monitors and controlled ventilation and Alan.  Vital signs at time of transfer:    BP 83/57  SpO2 (unable to obtain)    Supported hemodynamically with 0.1 mcg/kg/min epi; nicardipine 1mg/h.    Full report given to ICU team and care transferred.    Electronically Signed By: Danielle Alcantara MD  March 3, 2018  5:49 PM

## 2018-03-03 NOTE — PROGRESS NOTES
Pulmonary Medicine  Cystic Fibrosis - Lung Transplant Daily Progress Note   March 3, 2018       Patient: Kecia Blue  MRN: [id number]  Transplant Date: 2/21/2018 (POD# 2)  Admission date: 2/21/2018  Hospital Day #10          Assessment and Plan:     Kecia Blue is a 58 y/o woman with pmh sig for Dermatomyositis, seronegative RA, ILD, Pulm HTN,  S/p V-A ECMO for R heart failure on 2/27/18 now s/p BLST on 3/1/18 (POD#2) continues on VA-ECMO (POD#5) Surgeon: Henry Hernandez, Jacinto Dash. Donor ID [id number], Donor/Recipient serologies below.     PROBLEM LIST:  1. S/p BSLT   2. Right Heart Failure on VA-ECMO (R Sub clav art/R fem venous) ACT goal > 200  3. Acute Respiratory Failure s/p Oral ETT-Mechanical Ventilation  4. Acute Kidney Injury  5. Pressor-dependent hypotension  6. Volume overload with intravascular depletion  7. Electrolyte abnormalities - hypernatremia, hyperkalemia  8. Mild-moderate Protein Calorie Malnutrition  9. Donor Bronchial Washing (+) polymicrobial including Beta-Lactamase Moraxella catarrhalis  10. Severe SIRS/Sepsis  11. Poor peripheral pulses  12. Anemia of critical illness and/or ECMO-related, and/or hemorrhage  13. Leukocytosis    PULMONARY  1) S/P lung transplant complicated by right heart failure requiring cardiopulmonary support with VA-ECMO and Mechanical Ventilation via oral ETT  - awaiting explant pathology    Continue 3-Drug Immunosuppression:  Tacrolimus: current rate at 30 mcg/hr with TAC goal of 10 - 15. Given recent RADHA will decrease TAC goal to 9 - 11. Will continue to check TAC level on daily basis and dose adjust accordingly.       Date Tacro Goal  Tacro Level Tacro  Dose   3/2/18 10 - 15 19.4    3/3/18 Changed 9 - 11 pending 30 mcg/hr (decreased on 3/2/18)           Mycophenolate 1500 mg bid PGT bid (wbc = 14.2)  Prednisone: 12.9 mg bid per Tube to begin today on 03/03/2018    Continue Transplant Prophylaxis:   Patient/Donor Serologies:       Donor Recipient    CMV status  Pos   Pos    EBV status  Neg Pos   HSV status Neg  Pos     PJP: Bactrim 400-80 mg on tab daily  CMV: Valgancyclovir to begin on POD#8 per protocol  Fungus: Nystatin: 1,000,000 units QID    COR:  Right Heart Failure on VA-ECMO  Plan per ECMO Team  - OMA and attempt de-cannulation today in OR  - attempt placement of Right Heart Catheter  - continue to monitor strict I+O's and daily weights    ID  Severe SIRS/Sepsis with leukocytosis, hemodynamic lability, end-organ dysfunction, Cultures notable for (+) Moraxella, Staph aureus, currently on Zosyn 2.25 g q6 hrs and Vancomycin intermittently dosed for patient's renal failure  - Will continue current antibiotic regimen  - will f/u microbiology data and change antibiotics accordingly  - pan-culture if spikes    RENAL  Nonoliguric acute kidney injury: no indication for hemodialysis  - continue to trend Bun/Cr  - replete lytes on a prn basis  - will dose adjust patient medications according to their creatinine clearance  - will avoid nephrotoxic agents.  - We will monitor for medication interactions and immunosuppression levels in conjunction with Pharmacy    GI:  Protein-calorie malnutrition, currently on trophic tube feeds. Greatly appreciate Clinical Nutritional Services input.  Recommendations:  - Enteral Nutrition - Re-ordered Nutren 1.5 @ 10 mL/hr + 2 pkts ProSource daily. **Note, eventual goal rate will be 40 mL/hr.   - Feeding tube flush - 30 mL q4h for patency  - Multivitamin/mineral supplement therapy - certavite per previous recommendations    Disposition: Patient remains critically ill, to remain in ICU until medially stable          Subjective & Interval History:     Last 24 hours of care team notes reviewed.    Patient remain sedateds, on VA ECMO, mechanical ventilation,inhaled flolan, requiring pressors. 3  RBCs, 1 FFP, 1 platelet, 500 albumin this shift. ECMO flow 1.7-2.1, sweep 2, FiO2 80%. Act at goal >200. Ventilator remains at 60% FiO2.  Vasopressin weaned, Epinephrine continued. Ongoing sanguinous chest tube output >1L this shift primarily from mediastinal tubes.  Very weak pulses via doppler, dusky and cool fingretips and toes ongoing. Team updated regarding critical labs and ongoing bleeping, no new orders at this time. Consent on front of chart, not yet signed, in preparation for turn down and possible decannulation in OR this morning, spouse will be in at 0700. Patient will continue to be monitored and assessed. Please see MAR, flow sheets, and remainder of chart for further details. .            Review of Systems:     Unable to provided medical details or update ROS due to sedation and critical illness and other barriers to communication          Medications:     Active Medications:    piperacillin-tazobactam  2.25 g Intravenous Q6H     lidocaine (viscous)  5 mL Topical Once     multivitamins with minerals  15 mL Per Feeding Tube Daily     protein modular  1 packet Per Feeding Tube BID     nystatin  1,000,000 Units Swish & Swallow 4x Daily     sulfamethoxazole-trimethoprim  10 mL Oral or NG Tube Daily    Or     sulfamethoxazole-trimethoprim  1 tablet Oral or NG Tube Daily     pantoprazole (PROTONIX) IV  40 mg Intravenous Daily     mycophenolate  1,500 mg Oral BID IS    Or     mycophenolate  1,500 mg Oral or NG Tube BID IS     prednisoLONE  0.25 mg/kg (Dosing Weight) Oral or NG Tube BID     acetaminophen  975 mg Oral or Feeding Tube Q8H     docusate  100 mg Oral or Feeding Tube BID     artificial tears   Both Eyes Q6H     Active PRN Medications:  IV fluid REPLACEMENT ONLY, Reason beta blocker order not selected, naloxone, [START ON 3/4/2018] acetaminophen, oxyCODONE IR, ondansetron **OR** ondansetron, IV fluid REPLACEMENT ONLY, glucose **OR** dextrose **OR** glucagon, heparin, albuterol, propofol (DIPRIVAN) infusion **AND** propofol **AND** CK total **AND** Triglycerides, potassium chloride, potassium chloride, potassium chloride, potassium  chloride with lidocaine, potassium chloride, magnesium sulfate, magnesium sulfate         Physical Exam:     PHYSICAL EXAMINATION:   GEN: sedated in no acute distress, lying in bed at 45 degrees  HEENT: Pupils miotic, equal and reactive to light, conjunctiva are pink conjunctivae, sclera anicteric, not able to fully inspect oropharynx secondary to oral endotracheal tube: MMM    PULM: Minmal air movement.  Surgical wound not observed due to dressing  CV: Normal S1 and S2. RRR.  No murmur, gallop, or rub. Peripheral pulses diminished, Cannula and chest tube sites are clean dry intact with erythema/flocculence  ABD: Soft, nontender, nondistended. Bowel sound hypoactive no guarding, no rebound.   MSK: .  No apparent muscle wasting. No clubbing,(+) anasarca, Toes and finger tips are acrocyanotic.  NEURO: Sedated, unable to assess oriientation, grossly nonfocal  SKIN: Warm and dry. No visible rashes or lesions     Lines, Drains, and Devices:  Peripheral IV 02/21/18 Left;Lateral Upper forearm (Active)   Site Assessment WDL 3/3/2018  4:00 AM   Line Status Infusing 3/3/2018  4:00 AM   Phlebitis Scale 0-->no symptoms 3/3/2018  4:00 AM   Infiltration Scale 0 3/3/2018  4:00 AM   Infiltration Site Treatment Method  None 2/24/2018  4:00 AM   Extravasation? No 3/3/2018  4:00 AM   Dressing Intervention New dressing  3/3/2018  4:00 AM   IV Site Rotation Due Date 02/25/18 3/2/2018  4:00 AM   Reason Not Rotated Poor venous access 3/2/2018  4:00 AM   Number of days:10       Arterial Line 02/27/18 Femoral (Active)   Site Assessment WDL 3/3/2018  4:00 AM   Line Status Pulsatile blood flow 3/3/2018  4:00 AM   Art Line Waveform Appropriate 3/3/2018  4:00 AM   Art Line Interventions Leveled;Connections checked and tightened 3/3/2018  4:00 AM   Color/Movement/Sensation Cool fingers/toes;Capillary refill greater than 3 sec;Dusky fingers/toes;Pale fingers/toes 3/3/2018  4:00 AM   Dressing Type Transparent 3/3/2018  4:00 AM   Dressing Status  Clean, dry, intact 3/3/2018  4:00 AM   Dressing Change Due 03/04/18 3/2/2018  8:00 PM   Number of days:4       Arterial Line 02/28/18 Radial (Active)   Site Assessment WDL 3/3/2018  4:00 AM   Line Status Pulsatile blood flow 3/3/2018  4:00 AM   Art Line Waveform Dampened 3/3/2018  4:00 AM   Art Line Interventions Leveled;Connections checked and tightened 3/3/2018  4:00 AM   Color/Movement/Sensation Cool fingers/toes;Capillary refill greater than 3 sec;Dusky fingers/toes;Pale fingers/toes 3/3/2018  4:00 AM   Dressing Type Transparent 3/3/2018  4:00 AM   Dressing Status Clean, dry, intact 3/3/2018  4:00 AM   Dressing Intervention Dressing changed/new dressing 2/28/2018  4:00 PM   Dressing Change Due 03/04/18 3/2/2018  8:00 PM   Number of days:3       CVC Double Lumen 03/01/18 (Active)   Site Assessment WDL 3/3/2018  4:00 AM   Extravasation? No 3/3/2018  4:00 AM   Dressing Intervention Chlorhexidine sponge 3/3/2018  4:00 AM   Dressing Change Due 03/04/18 3/2/2018  4:00 AM   CVC Lumen Assessment White;Brown;Blue 3/2/2018  4:00 AM   Number of days:2       CVC Triple Lumen 02/27/18 Right Internal jugular (Active)   Site Assessment WDL 3/3/2018  4:00 AM   Extravasation? No 3/3/2018  4:00 AM   Dressing Intervention Chlorhexidine sponge 3/3/2018  4:00 AM   Dressing Change Due 03/04/18 3/2/2018  8:00 PM   CVC Comment cdi 2/28/2018  4:00 AM   CVC Lumen Assessment White;Brown;Blue 3/2/2018  4:00 AM   Number of days:4       Arterial Sheath  (Active)   Specific Qualities Sutured 3/3/2018  4:00 AM   Site Assessment Bleeding 3/3/2018  4:00 AM   Dressing Type Transparent 3/3/2018  4:00 AM   Dressing Intervention Dressing reinforced 3/2/2018  4:00 AM   Arterial Sheath Comment ECMO 3/3/2018  4:00 AM   Number of days:4       Venous Sheath (Active)   Specific Qualities Sutured 3/3/2018  4:00 AM   Site Assessment Bleeding 3/3/2018  4:00 AM   Dressing Type Transparent 3/3/2018  4:00 AM   Dressing Intervention Dressing reinforced 3/2/2018   "4:00 PM   Venous Sheath Comment ECMO 3/3/2018  4:00 AM   Number of days:4            Data:     All vital signs, laboratory and imaging data for the past 24 hours reviewed.      Vital signs:  Temp: 97.9  F (36.6  C) Temp src: Esophageal     Heart Rate: 96 Resp: 10 SpO2: 100 % O2 Device: Mechanical Ventilator Oxygen Delivery:  (per perfusion/RT ECMO changed to 90 percent) Height: 160 cm (5' 3\") Weight: 57.6 kg (126 lb 15.8 oz)    Weight trend:   Vitals:    03/01/18 0400 03/02/18 0400 03/03/18 0000   Weight: 54.2 kg (119 lb 7.8 oz) 55.7 kg (122 lb 12.7 oz) 57.6 kg (126 lb 15.8 oz)        I/O:   Intake/Output Summary (Last 24 hours) at 03/03/18 0838  Last data filed at 03/03/18 0700   Gross per 24 hour   Intake          6780.69 ml   Output             3550 ml   Net          3230.69 ml       Labs    CMP:   Recent Labs  Lab 03/03/18  0329 03/02/18  2142 03/02/18  1450 03/02/18  1021 03/02/18  0330 03/01/18  2017  03/01/18  0356   * 148* 145* 148* 148* 150*  < > 143   POTASSIUM 4.2 3.8 4.0 4.0 3.8 3.4  < > 4.3   CHLORIDE 117* 115* 117* 119* 117* 119*  < > 110*   CO2 20 20 22 22 22 24  < > 25   ANIONGAP 10 13 6 8 9 7  < > 8   * 135* 197* 116* 156* 232*  < > 125*   BUN 53* 49* 44* 39* 37* 30  < > 37*   CR 1.80* 1.68* 1.43* 1.42* 1.23* 0.95  < > 1.25*   GFRESTIMATED 29* 32* 38* 38* 45* 61  < > 44*   GFRESTBLACK 35* 38* 46* 46* 55* 74  < > 54*   CHELSEY 7.1* 6.9* 7.0* 6.9* 7.0* 6.8*  < > 7.8*   MAG 2.1 2.2  --   --  2.2 1.9  --  2.9*   PHOS 4.4  --   --   --  4.0 3.9  --  3.9   PROTTOTAL 4.0*  --   --   --   --  3.7*  --  5.6*   ALBUMIN 2.4*  --   --   --  2.9* 1.7*  --  2.4*   BILITOTAL 1.5*  --   --   --   --  1.2  --  0.7   ALKPHOS 27*  --   --   --   --  35*  --  37*   AST 34  --   --   --   --  38  --  58*   ALT 29  --   --   --  38 65*  --  172*   < > = values in this interval not displayed.  CBC:   Recent Labs  Lab 03/03/18  0329 03/02/18  2142 03/02/18  1450 03/02/18  1021   WBC 14.2* 11.9* 10.6 8.8   RBC " 3.31* 2.80* 3.27* 2.53*   HGB 10.0* 8.5* 10.2* 8.0*   HCT 28.8* 24.2* 29.2* 22.4*   MCV 87 86 89 89   MCH 30.2 30.4 31.2 31.6   MCHC 34.7 35.1 34.9 35.7   RDW 15.7* 16.6* 14.7 15.3*   PLT 90* 102* 86* 100*     INR:   Recent Labs  Lab 03/03/18  0329 03/02/18 2142 03/02/18  1450 03/02/18  1021   INR 1.36* 1.45* 1.52* 1.62*     Glucose:   Recent Labs  Lab 03/03/18  0747 03/03/18  0603 03/03/18  0459 03/03/18  0334 03/03/18  0329 03/03/18  0205 03/03/18  0042  03/02/18 2142 03/02/18  1450  03/02/18  1021  03/02/18  0330  03/01/18 2017   GLC  --   --   --   --  168*  --   --   --  135*  --  197*  --  116*  --  156*  --  232*   * 127* 140* 159*  --  152* 150*  < >  --   < >  --   < >  --   < >  --   < >  --    < > = values in this interval not displayed.  Blood Gas:   Recent Labs  Lab 03/03/18  0746 03/03/18  0600 03/03/18  0455  03/01/18  0357  02/28/18  1639  02/28/18  0356  02/27/18  1619   PHV  --   --   --   --   --   --   --   --   --   --  7.27*   PCO2V  --   --   --   --   --   --   --   --   --   --  61*   PO2V  --   --   --   --   --   --   --   --   --   --  34   HCO3V  --   --   --   --   --   --   --   --   --   --  28   LASHAUN  --   --   --   --  2.1  --  1.1  --  0.4  --   --    O2PER 60.0 60.0 60.0  < >  --   < >  --   < >  --   < > BJU2=831%   < > = values in this interval not displayed.  Culture Data   Recent Labs  Lab 03/03/18  0314 03/02/18  0524 03/01/18  1627 03/01/18  1618 03/01/18  0527 03/01/18  0356 02/28/18  0631   CULT No growth after 3 hours No growth after 1 day Culture negative monitoring continues  PENDING  PENDING  Canceled, Test credited  Test reordered as correct code Light growthStaphylococcus aureus*  PENDING  Canceled, Test credited  Test reordered as correct code  PENDING Light growthNormal alo to date  Culture in progress No growth after 2 days No growth after 3 days     Virology Data: Lab Results   Component Value Date    FLUAH1 Negative 02/27/2018    FLUAH3  Negative 02/27/2018    BW9141 Negative 02/27/2018    IFLUB Negative 02/27/2018    RSVA Negative 02/27/2018    RSVB Negative 02/27/2018    PIV1 Negative 02/27/2018    PIV2 Negative 02/27/2018    PIV3 Negative 02/27/2018    HMPV Negative 02/27/2018    HRVS Negative 02/27/2018    ADVBE Negative 02/27/2018    ADVC Negative 02/27/2018     Historical CMV results (last 3 of prior testing):  Lab Results   Component Value Date    CMVQNT Canceled, Test credited (A) 03/01/2018    CMVQNT Canceled, Test credited (A) 03/01/2018     Lab Results   Component Value Date    CMVLOG Canceled, Test credited 03/01/2018    CMVLOG Canceled, Test credited 03/01/2018     Urine Studies  Recent Labs   Lab Test  03/01/18   0528   URINEPH  5.5   NITRITE  Negative   LEUKEST  Negative   WBCU  4     Results on:   Mech Vent = CMV/AC FiO2 0.6, RR 10, , PEEP 10  VA-ECMO = ECMO flow 1.7-2.1, Sweep 2, FiO2 80%   3/3/2018 07:46   pH Arterial 7.40   pCO2 Arterial 30 (L)   PO2 Arterial 200 (H)   Bicarbonate Arterial 19 (L)   Base Deficit Art 5.3   FIO2 60.0   Oxyhemoglobin Arterial 97       CXR 3/3/18 report and film personally reviewed by me:  IMPRESSION:  1. Stable lines, tubes, and post surgical changes.  2. Mild increase in left perihilar interstitial opacities and stable  right perihilar opacities, pulmonary edema, infection.  3. Slight increase in mild bibasilar opacities which could represent  atelectasis or airspace consolidation.    Patient was seen and examined by me. I personally reviewed the electronic medical record including labs, flowsheets, imaging reports and films, vitals, and medications. I discussed the case in detail with the SICU team.  Clinical update provided to patient's . I answered all of his questions and provided him support.    I spent 40 minutes care time excluding teaching and procedures.  I greatly appreciate the excellent care the SICU team is providing to our patient. Please do not hesitate to call if you have  any questions or concerns. We will continue to follow along with you.    Alex Hale MD, MACM   of Medicine  Pulmonary, Critical Care and Sleep Medicine  [facility]  Pager: [phone]

## 2018-03-03 NOTE — ANESTHESIA PROCEDURE NOTES
Perioperative OMA Report  Anesthesia Information  OMA probe placed and report generated by: : SHELBY SUBRAMANIAN MEGAN JOY  Images for this study have been archived.     Echocardiogram Comments: RV hypertrophy with moderate to severely reduced RV function.  RV base / free wall slightly better function than RVOT.  LV normal size and function.  IVS flattened and bowing to left consistent with RV pressure and volume overload.  Trace TR, trace MR.      Preanesthesia Checklist:  Patient identified, IV assessed, risks and benefits discussed, monitors and equipment assessed, procedure being performed at surgeon's request and anesthesia consent obtained.  General Procedure Information  Modalities:  2D and Color flow mapping  Diagnostic indications for OMA:                             Lung Transplant.  Patient s/p lung transplant on V-A ECMO due to RV failure.  Echocardiographic and Doppler Measurements  Right Ventricle:  Cavity size dilated.   Hypertrophy present.   Global function moderately impaired.     Left Ventricle:  Cavity size normal.   Thrombus present.   Global Function normal.       Ventricular Regional Function:  1- Basal Anteroseptal:  normal  2- Basal Anterior:  normal  3- Basal Anterolateral:  normal  4- Basal Inferolateral:  normal  5- Basal Inferior:  normal  6- Basal Inferoseptal:  normal  7- Mid Anteroseptal:  normal  8- Mid Anterior:  normal  9- Mid Anterolateral:  normal  10- Mid Inferolateral:  normal  11- Mid Inferior:  normal  12- Mid Inferoseptal:  normal  13- Apical Anterior:  normal  14- Apical Lateral:  normal  15- Apical Inferior:  normal  16- Apical Septal:  normal  17- [place]:  normal    Valves  Aortic Valve: Annulus normal.  Stenosis not present.  Regurgitation absent.  Leaflets normal.  Leaflet motions normal.    Mitral Valve: Annulus normal.  Stenosis not present.  Regurgitation absent.  Leaflets normal.  Leaflet motions normal.    Tricuspid Valve: Annulus normal.   Stenosis not present.  Regurgitation absent.  Leaflets normal.  Leaflet motions normal.    Pulmonic Valve: Annulus normal.  Stenosis not present.  Regurgitation absent.      Aorta: Ascending Aorta: Size normal.  Dissection not present.  Plaque thickness less than 3 mm.  Mobile plaque not present.    Aortic Arch: Size normal.   Dissection not present.   Plaque thickness less than 3 mm.   Mobile plaque not present.    Descending Aorta: Size normal.   Dissection not present.   Plaque thickness less than 3 mm.   Mobile plaque not present.        Right Atrium:  Size dilated.    Left Atrium: Size dilated.  Left atrial appendage normal.           Post Intervention Findings  . .   Regional wall motion:   Surgeon(s) notified of all postintervention findings:  Yes  .  .  .   .  .  .  .  .  .  .        Echocardiogram Comments  RV hypertrophy with moderate to severely reduced RV function.  RV base / free wall slightly better function than RVOT.  LV normal size and function.  IVS flattened and bowing to left consistent with RV pressure and volume overload.  Trace TR, trace MR.

## 2018-03-03 NOTE — OP NOTE
DATE OF SERVICE:  3/3/2018.     PREOPERATIVE DIAGNOSES:   1.  Status post bilateral sequential lung transplant.  2.  Acute right heart dysfunction secondary to pulmonary hypertension status post cannulation for venoarterial extracorporeal membrane oxygenation.      POSTOPERATIVE DIAGNOSES:   1.   Severe end-stage life-threatening lung disease. 1.  Status post bilateral sequential lung transplant.  2.  Acute right heart dysfunction secondary to pulmonary hypertension status post cannulation for venoarterial extracorporeal membrane oxygenation.      PROCEDURE PERFORMED:   1.  Placement of [place] Supriya catheter through left internal jugular venous Cordis introducer.   2.  Weaning from extracorporeal membrane oxygenation.   3.  Extracorporeal membrane oxygenation decannulation.      SURGEON:  Toby Hernandez MD, PhD      RESIDENT SURGEON:  Alli Wrihgt MD      ANESTHESIA:  General endotracheal anesthesia with a double-lumen endotracheal tube.      ESTIMATED BLOOD LOSS:  1 liter.      SPECIMENS:  Bilateral native lungs.      INDICATIONS FOR PROCEDURE:  Mrs. Blue is a 57-year-old woman with dermatomyositis causing interstitial lung disease with pulmonary hypertension. She developed worsening respiratory status and right heart failure. She was placed on VA ECMO emergently and underwent bilateral sequential lung transplant. She is now stable for ECMO decannulation.     OPERATIVE FINDINGS:  Right ventricular function was much improved and the apex and free wall of the right ventricular were moving well. Oxygenation on 100% FiO2 with PEEP 8 and Flolan was excellent with pO2>500 mmHg..      OPERATIVE DESCRIPTION IN DETAIL:  After obtaining informed consent, the patient was brought to the operating room and placed in the supine position on the operating room table.  Appropriate lines and devices for monitoring were placed by the Anesthesia team.  The patient underwent smooth induction of general anesthesia. The trachea  was already intubated orally with a single-lumen endotracheal tube. The patient was prepped and draped.   A timeout was performed to confirm the correct patient identity, as well as the procedure to be performed.     The patient weaned from ECMO and the right femoral venous cannula was removed, closing the skin and subcuticular tissue with a pursestring suture. The patient was given protamine, and the right axillary cannulation graft was ligated and divided just above the graft. The right infraclavicular incision was closed in layers with absorbable suture. Sterile dressings were applied. The hemodynamics were optimized and the patient was taken to the ICU in stable condition.       ELEANOR SANCHEZ MD

## 2018-03-03 NOTE — PROGRESS NOTES
ECMO Shift Summary:    Patient remains on VA ECMO, all equipment is functioning and alarms are appropriately set. RPM's 2400 with flow range 1.82-2.18 L/min. Sweep gas is at 2.5 LPM and FiO2 90%. Circuit remains free of air, clot and fibrin. Cannulas are secure with no bleeding from site. Extremities are warm to touch. Suctioned ETT for small amount of blood tinged secretions.    Significant Shift Events:    Vent settings:  Ventilation Mode: CMV/AC  (Continuous Mandatory Ventilation/ Assist Control)  FiO2 (%): 60 %  Rate Set (breaths/minute): 10 breaths/min  Tidal Volume Set (mL): 400 mL  PEEP (cm H2O): 10 cmH2O  Oxygen Concentration (%): 60 %  Resp: 11.    Heparin is running at 1700 u/hr, ACT range 192-213.    Urine output is 10-15 ml/hr, blood loss was at the chest tubes. Product given included 3 units of PRBCs, 1 unit platelets, 1 unit plasma and 500 ml of Albumin.      Intake/Output Summary (Last 24 hours) at 18 0540  Last data filed at 18 0500   Gross per 24 hour   Intake          6591.29 ml   Output             3315 ml   Net          3276.29 ml       ECHO:  No results found for this or any previous visit.  Results for orders placed during the hospital encounter of 18   Echocardiogram    Narrative [id number]  ECH19  [id number]  [id number]^SHERINE^HODAN^MR           [facility],[place]  Echocardiography Laboratory  [address] [place] [id number]     Name: SARAI KILLIAN  MRN: [id number]  : 1960  Study Date: 2018 04:27 PM  Age: 57 yrs  Gender: Female  Patient Location: [facility]  Reason For Study: SOB  Ordering Physician: HODAN BASURTO  Referring Physician: NIKUNJ ADAIR  Performed By: Geovany Aguilar RDCS     BSA: 1.5 m2  Height: 63 in  Weight: 113 lb  BP: 101/69 mmHg  _____________________________________________________________________________  __        Procedure  Complete Portable Echo  Adult.  _____________________________________________________________________________  __        Interpretation Summary  The LV cavity is small and is underfilled. The Ejection Fraction is estimated  at 65-70%. Flattened septum is consistent with right ventricular pressure and  volume overload.  The RV free wall is severely hypokinetic with some preserved contractility of  the RV apex. This is consistent with McConnels sign and may be concerning for  acute pulmonary embolism in the right clinical setting.  There is lack of coaptation of the tricuspid leaflets due to severe annular  dilation. Right ventricular systolic pressure is 88.79mmHg above the right  atrial pressure. Moderate tricuspid insufficiency is present.  Small, 1.6cm pericardial effusion primarily localized anterior to the RV free  wall.     Compared to prior study on 2/19/2018, RV is more dilated, RV function has  worsened and estimated PA pressure is higher. Patient is intubated at the time  of the study.  Discussed with MICU team at 5:05pm.  _____________________________________________________________________________  __        Left Ventricle  The LV cavity is small and is underfilled. The Ejection Fraction is estimated  at 65-70%. Flattened septum is consistent with right ventricular pressure and  volume overload.     Right Ventricle  Severe right ventricular dilation is present. Global right ventricular  function is severely reduced. The RV free wall is severely hypokinetic with  some preserved contractility of the RV apex. This is consistent with McConnels  sign concerning for acute pulmonary embolism.     Atria  The left atrium appears normal. Moderate right atrial enlargement is present.     Mitral Valve  The mitral valve is normal.        Aortic Valve  The aortic valve cannot be assessed.     Tricuspid Valve  The tricuspid valve is normal. There is lack of coaptation of the tricuspid  leaflets due to severe annular dilation. Moderate  tricuspid insufficiency is  present. Right ventricular systolic pressure is 88.79mmHg above the right  atrial pressure.     Pulmonic Valve  The pulmonic valve cannot be assessed.     Vessels  The aorta root cannot be assessed. The inferior vena cava is normal. Unable to  assess mean RA pressure given the patient is on a ventilator.     Pericardium  Small, 1.6cm pericardial effusion primarily localized anterior to the RV free  wall.     _____________________________________________________________________________  __  MMode/2D Measurements & Calculations  RVDd: 5.1 cm  TAPSE: 1.3 cm           Doppler Measurements & Calculations  TV max P.7 mmHg  TR max herberth: 439.4 cm/sec  TR max P.7 mmHg     _____________________________________________________________________________  __           Report approved by: ANGELIKA Luna 2018 05:15 PM          CXR:  Recent Results (from the past 24 hour(s))   XR Abdomen Port 1 View    Narrative    Exam: XR ABDOMEN PORT 1 VW  3/2/2018 10:16 AM      History: post pyloric tube;     Comparison: Radiograph 2018    Findings: Feeding tube tip now projects over the distal second part of  the duodenum. Enteric tube tip projects over the stomach. Partially  visualized chest support devices, and ECMO cannula. Visualized bowel  is nondistended. No pneumatosis.      Impression    Impression: Feeding tube tip is now post pyloric, within the second  part of the duodenum.    I have personally reviewed the examination and initial interpretation  and I agree with the findings.    JAMES LAI MD   XR Chest Port 1 View    Narrative    Exam: XR CHEST PORT 1 VW, 3/2/2018 2:01 PM    Indication: acute desaturation;     Comparison: Chest x-ray 3/2/2018    Findings:   Endotracheal tube tip projects over the midthoracic trachea. Right IJ  catheter tip projects over the low SVC. Left-sided central venous  catheter tip projects over the right atrium. Stable positioning of the  inferior ECMO  cannula which projects over the right atrium. Bibasilar  and biapical chest tubes. Additional chest tube in the left lateral  chest. Mediastinal drain is unchanged. Esophageal temperature probe  projects over the mid esophagus. Gastric tube sidehole projecting over  the stomach. Enteric tube courses below the field of view.    Heart is normal in size. Relatively stable dense left cardiac  opacities and perihilar interstitial opacities. No pleural effusion or  pneumothorax. Decreased subcutaneous gas in the right neck.      Impression    Impression:   1. Stable support devices.  2. Unchanged perihilar interstitial opacities. Differential includes  pulmonary edema versus infection.  3. Unchanged retrocardiac atelectasis versus consolidation.    I have personally reviewed the examination and initial interpretation  and I agree with the findings.    ADAM GONZALEZ MD       Labs:    Recent Labs  Lab 03/03/18  0455 03/03/18  0329 03/03/18  0206 03/03/18  0037   PH 7.47* 7.35 7.37 7.38   PCO2 26* 36 34* 35   PO2 284* 105 98 174*   HCO3 19* 19* 20* 20*   O2PER 60.0 60.0 60.0 60.0       Lab Results   Component Value Date    HGB 10.0 (L) 03/03/2018    PHGB 40 (H) 03/03/2018    PLT 90 (L) 03/03/2018    FIBR 227 03/03/2018    INR 1.36 (H) 03/03/2018    PTT >240 (HH) 03/03/2018    DD 0.3 03/03/2018    AXA 1.31 (HH) 03/03/2018    ANTCH 46 (L) 03/02/2018         Plan is to continue to provide VA ECMO support and possible OR today with OMA, turn down and decannulation.      Joshua Vallejo, WATSON  3/3/2018 5:40 AM

## 2018-03-03 NOTE — BRIEF OP NOTE
[facility], [place]    Brief Operative Note    Pre-operative diagnosis: Post lung transplant, ECMO in place  Post-operative diagnosis Post op lung transplant  Procedure: Procedure(s):  Removal of Extracorporeal Membrane Oxygenator - Wound Class: I-Clean  Surgeon: Surgeon(s) and Role:     * Toby Hernandez MD - Primary     * Alli Wright MD - Assisting  Anesthesia: General   Estimated blood loss: Less than 10 ml  Drains: None  Specimens: * No specimens in log *  Findings:   None.  Complications: None.  Implants: None.

## 2018-03-03 NOTE — ANESTHESIA PREPROCEDURE EVALUATION
Anesthesia Evaluation     . Pt has had prior anesthetic.     No history of anesthetic complications          ROS/MED HX    ENT/Pulmonary: Comment: B/L lung transplant on 3/1 for interstitial lung disease.       Neurologic:  - neg neurologic ROS     Cardiovascular: Comment: Currently on VA ECMO for right heart failure.     (+) ----. : . . . :. . pulmonary hypertension, Previous cardiac testing Echodate:2/28/18results:Interpretation Summary  Patient s/p VA ECMO and bilateral lung tx.  Limited Portable Echo Adult. Poor acoustic windows.     Limited views of LV are obtained.The Ejection Fraction is estimated at 5-10%.  There is severe diffuse hypokinesis.  Right ventricular function cannot be assessed due to poor image quality.  Valves cannot be assessed.  No pericardial effusion is present along the viewed segments of LV.date: results: date: results: date: results:          METS/Exercise Tolerance:     Hematologic:         Musculoskeletal: Comment: Dermatomyositis, RA.        GI/Hepatic:  - neg GI/hepatic ROS       Renal/Genitourinary:     (+) chronic renal disease, type: ARF,       Endo:  - neg endo ROS       Psychiatric:         Infectious Disease:  - neg infectious disease ROS       Malignancy:      - no malignancy   Other:    - neg other ROS                 Physical Exam      Airway   Comment: Intubated    Dental     Cardiovascular   Rhythm and rate: regular and normal      Pulmonary (+) decreased breath sounds                       Anesthesia Plan      History & Physical Review  History and physical reviewed and following examination; no interval change.    ASA Status:  4 emergent.        Plan for General and ETT with Intravenous induction. Maintenance will be Balanced.           Postoperative Care  Postoperative pain management:  IV analgesics.      Consents  Anesthetic plan, risks, benefits and alternatives discussed with:  Spouse.  Use of blood products discussed: Yes.   Use of blood products discussed with  Spouse.  Consented to blood products.  .        ANESTHESIA PREOP EVALUATION    Procedure: Procedure(s):  INSERT EXTRACORPORAL MEMBRANE OXYGENATOR  or possible Right IJ Cannulation - Wound Class: I-Clean    HPI: Kecia Blue is a 57 year old female presenting for above procedure to assess heart function for possible decannulation. If unable to decannulate then move venous cannulation to RIJ.     PMHx/PSHx/ROS:  Past Medical History:   Diagnosis Date     Antisynthetase syndrome (H) 2014     Chronic cough      Dermatomyositis (H)      Dysplasia of cervix, low grade (ESTRADA 1)      ILD (interstitial lung disease) (H)      Osteopenia      Pulmonary hypertension      Raynaud's disease      Seronegative rheumatoid arthritis (H)        Past Surgical History:   Procedure Laterality Date     ENT SURGERY      tonsillectomy as a child     INSERT EXTRACORPORAL MEMBRANE OXYGENATOR ADULT (OUTSIDE OR) N/A 2/27/2018    Procedure: INSERT EXTRACORPORAL MEMBRANE OXYGENATOR ADULT (OUTSIDE OR);  INSERT EXTRACORPORAL MEMBRANE OXYGENATOR ADULT (OUTSIDE OR) ;  Surgeon: Toby Hernandez MD;  Location: [facility] OR     no prior surgery       TRANSPLANT LUNG RECIPIENT SINGLE X2 Bilateral 3/1/2018    Procedure: TRANSPLANT LUNG RECIPIENT SINGLE X2;  Median Sternotomy, Extracorporeal Membrane Oxygenator, bilateral sequential lung transplant;  Surgeon: Toby Hernandez MD;  Location: [facility] OR         Past Anes Hx: No personal or family h/o anesthesia problems    Soc Hx:   Social History   Substance Use Topics     Smoking status: Never Smoker     Smokeless tobacco: Never Used     Alcohol use 0.6 oz/week     1 Glasses of wine per week      Comment: 1 glass 3 times weekly       Allergies: No Known Allergies    Meds:   Prescriptions Prior to Admission   Medication Sig Dispense Refill Last Dose     order for DME Equipment being ordered: Oxygen 5 liters at rest, 15 liters with exertion.  Needs portability.  Please provide 2 10-liter  "concentrators for in the home 1 Device prn 2/21/2018 at Unknown time     AZATHIOPRINE PO Take 50 mg by mouth 2 times daily 50 mg tablet, 1.5 tablets by mouth 2 times a day   2/20/2018 at 2200     calcium carbonate (OS-CHELSEY 500 MG [place]. [place]) 1250 MG tablet Take 1 tablet by mouth 2 times daily   2/20/2018 at 2200     multivitamin, therapeutic with minerals (THERA-VIT-M) TABS tablet Take 1 tablet by mouth daily   2/20/2018 at 0800     PREDNISONE PO Take 5 mg by mouth daily   2/20/2018 at 0800     sulfamethoxazole-trimethoprim (BACTRIM DS/SEPTRA DS) 800-160 MG per tablet Take 1 tablet by mouth Every Mon, Wed, Fri Morning   Past Week at Unknown time     TACROLIMUS PO Take 0.1 mg by mouth 2 times daily 0.1 mg capsule, take 4 capsules by mouth 2 times a day.   2/20/2018 at 2200     nystatin (MYCOSTATIN) 610701 UNIT/ML suspension Take 100,000 Units by mouth 4 times daily 100,000 unit/ ml, take 4- 6 ml by mouth 4 times a day.   More than a month at Unknown time       No current outpatient prescriptions on file.       Physical Exam:  Vitals: BP 99/74  Pulse 88  Temp 36.6  C (97.9  F)  Resp 10  Ht 1.6 m (5' 3\")  Wt 57.6 kg (126 lb 15.8 oz)  SpO2 100%  BMI 22.49 kg/m2  BMI= Body mass index is 22.49 kg/(m^2).      Labs:  UPT: No results found for: HCGQUANT      BMP:  Recent Labs   Lab Test  03/03/18 0329   NA  147*   POTASSIUM  4.2   CHLORIDE  117*   CO2  20   BUN  53*   CR  1.80*   GLC  168*   CHELSEY  7.1*     CBC:   Recent Labs   Lab Test  03/03/18 0329   WBC  14.2*   RBC  3.31*   HGB  10.0*   HCT  28.8*   MCV  87   MCH  30.2   MCHC  34.7   RDW  15.7*   PLT  90*     Coags:  Recent Labs   Lab Test  03/03/18 0329   INR  1.36*   PTT  >240*   FIBR  227       Assessment/Plan:  - ASA 4E  - GETA with standard ASA monitors, IV induction, balanced anesthetic  -OMA  - Antibiotics per surgery  - PONV prophylaxis  - Blood products available, possible administration discussed with patient  - Relevant risks, benefits, alternatives " and the anesthetic plan were discussed with patient/family or family representative.  All questions were answered and there was agreement to proceed.      Elver FORTUNE-3, D.O.    3/3/2018  6:57 AM      Agree with resident documentation above.  57F with ILD and RV failure now s/p lung transplant, placed on ECMO postoperatively.  Plan for OR today for ECMO turn down with OMA guidance, possible decannulation.  ASA 4E.  Plan: GETA with existing ETT.  Adequate IV access in place.  Will place OMA and attempt PAC placement via existing L MAC line.  Blood available in OR.    Danielle Alcantara MD  Staff Anesthesiologist  Pager [phone]

## 2018-03-03 NOTE — ANESTHESIA CARE TRANSFER NOTE
Patient: Kecia Blue    Procedure(s):  Removal of Extracorporeal Membrane Oxygenator - Wound Class: I-Clean    Diagnosis: Post lung transplant  Diagnosis Additional Information: No value filed.    Anesthesia Type:   General, ETT     Note:  Airway :ETT  Patient transferred to:ICU  ICU Handoff: Call for PAUSE to initiate/utilize ICU HANDOFF, Identified Patient, Identified Responsible Provider, Reviewed the Pertinent Medical History, Discussed Surgical Course, Reviewed Intra-OP Anesthesia Management and Issues during Anesthesia, Set Expectations for Post Procedure Period and Allowed Opportunity for Questions and Acknowledgement of Understanding      Vitals: (Last set prior to Anesthesia Care Transfer)    CRNA VITALS  3/3/2018 1121 - 3/3/2018 1151      3/3/2018             Resp Rate (set): 10                Electronically Signed By: Cristal Chin CRNA, APRN CRNA  March 3, 2018  11:51 AM

## 2018-03-03 NOTE — PLAN OF CARE
Problem: Patient Care Overview  Goal: Plan of Care/Patient Progress Review  Patient remain sedateds, on VA ECMO, mechanical ventilation,inhaled flolan, requiring pressors. 3  RBCs, 1 FFP, 1 platelet, 500 albumin this shift. ECMO flow 1.7-2.1, sweep 2, FiO2 80%. Act at goal >200. Ventilator remains at 60% FiO2. Vasopressin weaned, Epinephrine continued. Ongoing sanguinous chest tube output >1L this shift primarily from mediastinal tubes.  Very weak pulses via doppler, dusky and cool fingretips and toes ongoing. Team updated regarding critical labs and ongoing bleeping, no new orders at this time. Consent on front of chart, not yet signed, in preparation for turn down and possible decannulation in OR this morning, spouse will be in at 0700. Patient will continue to be monitored and assessed. Please see MAR, flow sheets, and remainder of chart for further details. .

## 2018-03-03 NOTE — PLAN OF CARE
Problem: Patient Care Overview  Goal: Plan of Care/Patient Progress Review  OT 4E Cx Pt to OR.

## 2018-03-03 NOTE — PROGRESS NOTES
CVTS PROGRESS NOTE  March 2, 2018      ASSESSMENT: Kecia Blue is a 57 year old female with PMH significant for dermatomyositis on immunosuppression, eronegative RA, ILD, and pulmonary hypertension, who was admitted for emergent lung transplant work-up on 2/21 for increasing shortness of breath and hypoxia now POD # 3 s/p emergent VA ECMO placement on 2/27/18. Now s/p bilateral lung transplant on 3/1/2018 with Dr. Hernandez.    TODAY'S PROGRESS/PLAN  - To OR for ECMO weaning/removal  - FFP x 2, Plts x 1  - Albumin 250 cc x 1  - Lactate q4 hrs    PLAN:  Neuro/ pain/ sedation:  - Monitor neurological status. Notify the MD for any acute changes in exam.  - Fentanyl gtt for pain.  - Propofol gtt for sedation.    Pulmonary care:  # Acute on chronic respiratory failure, worsening, s/p bilateral lung transplantation 3/1/2018  # ILD related to dermatomyositis, on transplant list  # Severe pulmonary hypertension (WHO class III)  # Pneumomediastinum, 2/22/2018   - Supplemental oxygen via VA ECMO and ventilator to keep saturation above 92 %.  - Flolan 20mg/kg/min, wean as able, monitor CVPs  - Nitric oxide weaned off 3/1  - Attempt to remove VA ECMO today, q2h ABG    Cardiovascular:  # Right heart failure  # Pulmonary HTN    - Monitor hemodynamic status.   - Most likely 2/2 pulmonary hypertension from ILD, but did not rule out PE - unlikely given hypotension occurred after intubation and did not occur at the same time as her worsening hypoxia.     GI care:   - NPO except ice chips and medications.  - NJ placed, Abd film, cont trickle feeds    Fluids/ Electrolytes/ Nutrition:   - Periodic fluid bolus for IV fluid hydration  - Parenteral nutrition to start with placement of NG/NJ tube.    Renal/ Fluid Balance:  #RADHA-resolved    - Patient oliguric  - Cr trending up post-op, patient will receive albumin, ffp x 2, and plt x 1; hopeful increased volume will improve UOP  - Will continue to monitor intake and  output.    Endocrine:    - Insulin gtt     ID/ Antibiotics:  - Anti-rejection and immunosuppressant medications per transplant team    Heme:     - Hemoglobin stable, trending down, Chest tube output has decreased will transfuse for Hgb less than 7.   - q2h Hgb monitoring with ECMO protocol    Prophylaxis:    - Mechanical prophylaxis for DVT.   - Famotidine BID    Lines/ tubes/ drains:  - VA-ECMO right subclavian, right femoral to move to RIJ; likely removal today  - Will attempt placement of upper extremity arterial line today to facilitate removal of L groin arterial line.    Disposition:  - CVICU.   - Plan for OR 3/3 for turndown and possible decannulation    Alli Wright MD, MS  CT Fellow    ====================================    TODAY'S PROGRESS:   SUBJECTIVE:   - Lung transplant overnight, received 3 RBCs, 1 plt, 1 ffp overnight  - High chest tube output overnight, concordant with increase in ACT goal.    OBJECTIVE:   1. VITAL SIGNS:   Temp:  [96.1  F (35.6  C)-98.8  F (37.1  C)] 98.8  F (37.1  C)  Heart Rate:  [] 112  Resp:  [10-16] 16  MAP:  [57 mmHg-93 mmHg] 68 mmHg  Arterial Line BP: ()/(49-85) 105/56  FiO2 (%):  [60 %-100 %] 70 %  SpO2:  [87 %-100 %] 100 %  Ventilation Mode: CMV/AC  (Continuous Mandatory Ventilation/ Assist Control)  FiO2 (%): 70 %  Rate Set (breaths/minute): 16 breaths/min  Tidal Volume Set (mL): 400 mL  PEEP (cm H2O): 10 cmH2O  Oxygen Concentration (%): 80 %  Resp: 16    2. INTAKE/ OUTPUT:   I/O last 3 completed shifts:  In: 8020.31 [I.V.:3100.31; NG/GT:550]  Out: 3600 [Urine:340; Chest Tube:3260]    3. PHYSICAL EXAMINATION:   General: Sedated, NAD  Neuro: Sedated, moving mouth with oral cares, NAD  Resp: Intubated, diminished lung sounds bilaterally  CV: RRR, No noted m/r/g  Abdomen: Soft, Non-distended  Incisions: c/d/i  Extremities: warm and well perfused    4. INVESTIGATIONS:   Arterial Blood Gases     Recent Labs  Lab 03/03/18  1412 03/03/18  1204 03/03/18  1052  03/03/18  1003   PH 7.43 7.35 7.37 7.50*   PCO2 32* 36 36 28*   PO2 263* 244* 161* 540*   HCO3 21 20* 21 21     Complete Blood Count     Recent Labs  Lab 03/03/18  1448 03/03/18  1204 03/03/18  1052 03/03/18  1020 03/03/18  1003  03/03/18  0329 03/02/18  2142   WBC 20.8* 22.4*  --   --   --   --  14.2* 11.9*   HGB 9.4* 11.8 11.2*  --  8.2*  < > 10.0* 8.5*   * 87*  --  73*  --   --  90* 102*   < > = values in this interval not displayed.  Basic Metabolic Panel    Recent Labs  Lab 03/03/18  1448 03/03/18  1204 03/03/18  1052 03/03/18  1003  03/03/18  0329 03/02/18  2142   * 150* 144 147*  < > 147* 148*   POTASSIUM 4.1 4.1 4.0 4.1  < > 4.2 3.8   CHLORIDE 116* 119*  --   --   --  117* 115*   CO2 23 20  --   --   --  20 20   BUN 59* 59*  --   --   --  53* 49*   CR 1.95* 1.83*  --   --   --  1.80* 1.68*   * 202* 231* 238*  < > 168* 135*   < > = values in this interval not displayed.  Liver Function Tests    Recent Labs  Lab 03/03/18  1412 03/03/18  1204 03/03/18  1020 03/03/18  0329 03/02/18  0330  03/01/18 2017 03/01/18  0356   AST  --   --   --  34  --   --   --  38  --  58*   ALT  --   --   --  29  --  38  --  65*  --  172*   ALKPHOS  --   --   --  27*  --   --   --  35*  --  37*   BILITOTAL  --   --   --  1.5*  --   --   --  1.2  --  0.7   ALBUMIN  --   --   --  2.4*  --  2.9*  --  1.7*  --  2.4*   INR 1.20* 1.35* 1.66* 1.36*  < > 1.51*  < > 1.86*  < > 1.51*   < > = values in this interval not displayed.  Pancreatic Enzymes  No lab results found in last 7 days.  Coagulation Profile    Recent Labs  Lab 03/03/18  1412 03/03/18  1204 03/03/18  1020 03/03/18  0329   INR 1.20* 1.35* 1.66* 1.36*   PTT 31 30 30 >240*     Lactate  Invalid input(s): LACTATE    5. RADIOLOGY:   Recent Results (from the past 24 hour(s))   XR Chest Port 1 View    Narrative    XR CHEST PORT 1 VW  3/3/2018 2:18 AM    History: Endotracheal tube placement    Comparison: Prior day    Findings:   Single portable AP view of the  chest is obtained. Endotracheal tube is  in stable position 5.6 cm above the gee. Right internal jugular  central venous catheter tip projects over the mid SVC. Left internal  jugular central venous catheter tip projects over the superior  cavoatrial junction. There is a right inferior approach ECMO cannula  with the tip projecting near the superior cavoatrial junction.  Postsurgical changes of a subclavian artery cutdown for ECMO access  projecting over the right shoulder/axilla. Gastric tube tip and  sidehole projecting over left upper quadrant. Feeding type tube tip  not included in the image. Biapical pleural chest tubes.    The cardiomediastinal silhouette is not enlarged. Diffuse interstitial  fibrotic opacities are mildly increased in the left and stable on the  right. Increase in mild bibasilar opacities. No suspected pleural  effusion. No pneumothorax. Right supraclavicular subcutaneous  emphysema stable.      Impression    IMPRESSION:  1. Stable lines, tubes, and post surgical changes.  2. Mild increase in left perihilar interstitial opacities and stable  right perihilar opacities, pulmonary edema, infection.  3. Slight increase in mild bibasilar opacities which could represent  atelectasis or airspace consolidation.      I have personally reviewed the examination and initial interpretation  and I agree with the findings.    CHAUNCEY HUNTER MD ([place])   XR Chest Port 1 View    Narrative    Exam: XR CHEST PORT 1 VW, 3/3/2018 11:40 AM    Indication: swan in OR;     Comparison: 3/3/2018    Findings:   Single AP view the chest. ET tube tip 5.3 cm from the gee, right IJ  CVC with tip at the low SVC. Bilateral basilar and apical chest tubes  and mediastinal drains. NG tube projects over the stomach. Feeding  tube tip is out of the field-of-view. [employer] [place]-Supriya  catheter tip projecting over the main pulmonary artery. Midline  sternotomy wires and surgical clips projecting over the right  axilla.    Cardiac silhouette is stable. Patchy perihilar opacities, similar to  prior. No pneumothorax or significant pleural effusion visualized.  Right costophrenic angle is collimated outside the field-of-view. Soft  tissue emphysema over the right supraclavicular soft tissues, similar  to prior.      Impression    Impression: [place]-Supriya catheter with tip projecting over the proximal  right main pulmonary artery.    I have personally reviewed the examination and initial interpretation  and I agree with the findings.    CHAUNCEY HUNTER MD ([place])       =========================================

## 2018-03-03 NOTE — PROGRESS NOTES
ECMO Shift Summary:    Patient remains on VA ECMO, all equipment is functioning and alarms are appropriately set. RPM's 2400 with flow range 1.99-3.19 L/min. Sweep gas is at 2.5 LPM and FiO2 100%. Circuit remains free of air, clot and fibrin. Cannulas are secure with no bleeding from site. Extremities are warm and dusky. Suctioned ETT for small amount of blood tinged secretions.    Significant Shift Events:    Vent settings:  Ventilation Mode: CMV/AC  (Continuous Mandatory Ventilation/ Assist Control)  FiO2 (%): 100 %  Rate Set (breaths/minute): 14 breaths/min  Tidal Volume Set (mL): 430 mL  PEEP (cm H2O): 10 cmH2O  Oxygen Concentration (%): 100 %  Resp: 14.    Heparin is running at 1600 u/hr, ACT range 148-176.    Urine output is as charted, blood loss was thru chest tubes. Product given included 4 u PRBCs, 1 PLT and 250 of albumin.      Intake/Output Summary (Last 24 hours) at 18 1814  Last data filed at 18 1800   Gross per 24 hour   Intake          8799.73 ml   Output             4920 ml   Net          3879.73 ml       ECHO:  No results found for this or any previous visit.  Results for orders placed during the hospital encounter of 18   Echocardiogram    Narrative [id number]  [facility]19  [id number]  [id number]^SHERINE^HODAN^MR           [facility],[place]  Echocardiography Laboratory  [address] [place] [id number]     Name: SARAI KILLIAN  MRN: [id number]  : 1960  Study Date: 2018 04:27 PM  Age: 57 yrs  Gender: Female  Patient Location: [facility]  Reason For Study: SOB  Ordering Physician: HODAN BASURTO  Referring Physician: NIKUNJ ADAIR  Performed By: Geovany Aguilar RDCS     BSA: 1.5 m2  Height: 63 in  Weight: 113 lb  BP: 101/69 mmHg  _____________________________________________________________________________  __        Procedure  Complete Portable Echo Adult.  _____________________________________________________________________________  __         Interpretation Summary  The LV cavity is small and is underfilled. The Ejection Fraction is estimated  at 65-70%. Flattened septum is consistent with right ventricular pressure and  volume overload.  The RV free wall is severely hypokinetic with some preserved contractility of  the RV apex. This is consistent with McConnels sign and may be concerning for  acute pulmonary embolism in the right clinical setting.  There is lack of coaptation of the tricuspid leaflets due to severe annular  dilation. Right ventricular systolic pressure is 88.79mmHg above the right  atrial pressure. Moderate tricuspid insufficiency is present.  Small, 1.6cm pericardial effusion primarily localized anterior to the RV free  wall.     Compared to prior study on 2/19/2018, RV is more dilated, RV function has  worsened and estimated PA pressure is higher. Patient is intubated at the time  of the study.  Discussed with MICU team at 5:05pm.  _____________________________________________________________________________  __        Left Ventricle  The LV cavity is small and is underfilled. The Ejection Fraction is estimated  at 65-70%. Flattened septum is consistent with right ventricular pressure and  volume overload.     Right Ventricle  Severe right ventricular dilation is present. Global right ventricular  function is severely reduced. The RV free wall is severely hypokinetic with  some preserved contractility of the RV apex. This is consistent with McConnels  sign concerning for acute pulmonary embolism.     Atria  The left atrium appears normal. Moderate right atrial enlargement is present.     Mitral Valve  The mitral valve is normal.        Aortic Valve  The aortic valve cannot be assessed.     Tricuspid Valve  The tricuspid valve is normal. There is lack of coaptation of the tricuspid  leaflets due to severe annular dilation. Moderate tricuspid insufficiency is  present. Right ventricular systolic pressure is 88.79mmHg above the  right  atrial pressure.     Pulmonic Valve  The pulmonic valve cannot be assessed.     Vessels  The aorta root cannot be assessed. The inferior vena cava is normal. Unable to  assess mean RA pressure given the patient is on a ventilator.     Pericardium  Small, 1.6cm pericardial effusion primarily localized anterior to the RV free  wall.     _____________________________________________________________________________  __  MMode/2D Measurements & Calculations  RVDd: 5.1 cm  TAPSE: 1.3 cm           Doppler Measurements & Calculations  TV max P.7 mmHg  TR max herberth: 439.4 cm/sec  TR max P.7 mmHg     _____________________________________________________________________________  __           Report approved by: ANGELIKA Luna 2018 05:15 PM          CXR:  Recent Results (from the past 24 hour(s))   XR Chest Port 1 View    Narrative    Chest radiograph  3/1/2018 8:33 PM    History:  Postop lung transplantation.     Comparison: Chest radiograph earlier today    Findings:   Portable supine AP chest radiograph. Endotracheal tube with the tip  projects 5.2 cm above gee. New biapical and bibasilar chest tubes  and mediastinal drainage tube. Stable position of gastric and feeding  tube, right IJ catheter, left IJ central venous catheter.  Cardiac silhouette is stable. Pulmonary vasculature is distinct. No  pneumothorax or pleural effusion. The visualized upper abdomen is  unremarkable. Surgical clips projects over right axilla region.  Subcutaneous emphysema along the right neck.      Impression    IMPRESSION:  New biapical and basilar chest tubes, and mediastinal drainage tube.  Otherwise stable supportive lines and tubes.    I have personally reviewed the examination and initial interpretation  and I agree with the findings.    TALI CARRANZA MD   XR Chest Port 1 View    Narrative    XR CHEST PORT 1 VW  3/2/2018 2:03 AM    History: Endotracheal tube placement    Comparison: Prior day    Findings:    Single portable AP view of the chest is obtained. Endotracheal tube is  in stable position. Right internal jugular central venous catheter tip  projects over the mid SVC. There is a right inferior approach ECMO  cannula with the tip projecting near the superior cavoatrial junction.  Postsurgical changes of a subclavian artery cutdown for ECMO access  projecting over the right shoulder/axilla. Gastric tube tip and  sidehole projecting over left upper quadrant. Feeding type tube tip  not included in the image.    The cardiomediastinal silhouette is not enlarged. Diffuse interstitial  fibrotic opacities are again noted. Mild increase in overlying  scattered patchy opacities. No suspected pleural effusion. No  pneumothorax. Right supraclavicular subcutaneous emphysema stable.      Impression    IMPRESSION:  1. Stable lines, tubes, and post surgical changes.  2. Stable diffuse interstitial opacities.  3. Increase in patchy opacities which could represent atelectasis or  airspace consolidation.    I have personally reviewed the examination and initial interpretation  and I agree with the findings.    YONATAN HERNANDES MD   XR Abdomen Port 1 View    Narrative    Exam: XR ABDOMEN PORT 1 VW  3/2/2018 10:16 AM      History: post pyloric tube;     Comparison: Radiograph 2/28/2018    Findings: Feeding tube tip now projects over the distal second part of  the duodenum. Enteric tube tip projects over the stomach. Partially  visualized chest support devices, and ECMO cannula. Visualized bowel  is nondistended. No pneumatosis.      Impression    Impression: Feeding tube tip is now post pyloric, within the second  part of the duodenum.    I have personally reviewed the examination and initial interpretation  and I agree with the findings.    JAMES LAI MD   XR Chest Port 1 View    Narrative    Exam: XR CHEST PORT 1 VW, 3/2/2018 2:01 PM    Indication: acute desaturation;     Comparison: Chest x-ray 3/2/2018    Findings:    Endotracheal tube tip projects over the midthoracic trachea. Right IJ  catheter tip projects over the low SVC. Left-sided central venous  catheter tip projects over the right atrium. Stable positioning of the  inferior ECMO cannula which projects over the right atrium. Bibasilar  and biapical chest tubes. Additional chest tube in the left lateral  chest. Mediastinal drain is unchanged. Esophageal temperature probe  projects over the mid esophagus. Gastric tube sidehole projecting over  the stomach. Enteric tube courses below the field of view.    Heart is normal in size. Relatively stable dense left cardiac  opacities and perihilar interstitial opacities. No pleural effusion or  pneumothorax. Decreased subcutaneous gas in the right neck.      Impression    Impression:   1. Stable support devices.  2. Unchanged perihilar interstitial opacities. Differential includes  pulmonary edema versus infection.  3. Unchanged retrocardiac atelectasis versus consolidation.    I have personally reviewed the examination and initial interpretation  and I agree with the findings.    ADAM GONZALEZ MD       Labs:    Recent Labs  Lab 03/02/18  1703 03/02/18  1518 03/02/18  1450 03/02/18  1420   PH 7.49* 7.39 7.30*  7.30* 7.33*   PCO2 27* 32* 42  42 39   PO2 487* 417* 282*  267* 66*   HCO3 20* 19* 20*  20* 20*   O2PER 100 100 60  60 60       Lab Results   Component Value Date    HGB 10.2 (L) 03/02/2018    PHGB 30 (H) 03/02/2018    PLT 86 (L) 03/02/2018    FIBR 221 03/02/2018    INR 1.52 (H) 03/02/2018     (HH) 03/02/2018    DD 0.3 03/02/2018    AXA 0.53 03/02/2018    ANTCH 46 (L) 03/02/2018         Plan is continue to provide VA ECMO support.      Kaylin Troy, RRT  3/2/2018 6:14 PM

## 2018-03-03 NOTE — PROGRESS NOTES
ECMO Attending Progress Note  3/3/2018    Kecia Blue is a 57 year old female who was started on ECMO due to severe respiratory failure from dermatomyositis.   Interval events: began waning ecmo  The patients vital signs today are as follows:    Temp:  [97  F (36.1  C)-98.1  F (36.7  C)] 97.9  F (36.6  C)  Heart Rate:  [] 96  Resp:  [10-15] 10  MAP:  [62 mmHg-93 mmHg] 87 mmHg  Arterial Line BP: ()/(58-85) 101/79  FiO2 (%):  [60 %-100 %] 60 %  SpO2:  [87 %-100 %] 100 %    Intake/Output Summary (Last 24 hours) at 03/03/18 0827  Last data filed at 03/03/18 0700   Gross per 24 hour   Intake          6780.69 ml   Output             3550 ml   Net          3230.69 ml    Ventilation Mode: CMV/AC  (Continuous Mandatory Ventilation/ Assist Control)  FiO2 (%): 60 %  Rate Set (breaths/minute): 10 breaths/min  Tidal Volume Set (mL): 400 mL  PEEP (cm H2O): 10 cmH2O  Oxygen Concentration (%): 60 %  Resp: 10   Recent Labs  Lab 03/03/18  0746 03/03/18  0600 03/03/18  0455 03/03/18  0329   PH 7.40 7.48* 7.47* 7.35   PCO2 30* 27* 26* 36   PO2 200* 274* 284* 105   HCO3 19* 20* 19* 19*   O2PER 60.0 60.0 60.0 60.0      Recent Labs  Lab 03/03/18  0329 03/02/18  2142 03/02/18  1450 03/02/18  1021   WBC 14.2* 11.9* 10.6 8.8   HGB 10.0* 8.5* 10.2* 8.0*     Creatinine   Date Value Ref Range Status   03/03/2018 1.80 (H) 0.52 - 1.04 mg/dL Final   03/02/2018 1.68 (H) 0.52 - 1.04 mg/dL Final   03/02/2018 1.43 (H) 0.52 - 1.04 mg/dL Final   03/02/2018 1.42 (H) 0.52 - 1.04 mg/dL Final     Physical Exam:   Cannula unchanged.  Minimal oozing.  Minimal air movement  Extremities edematous, toes a little blue    ECMO Issues including assessments and plan on DOS 3/3/2018:    Neuro: Sedated for mechanical ventilation and ECMO.    RASS goal: 4  CV: Hemodynamically stable with MAP 60-70,  ECMO flow 1.7-2.1, Sweep 2, FiO2 80%  Pulm: Severe respiratory failure requiring ECMO and mechanical ventilation. Flows unchanged at 1.7-2.1 LPM   Keep  vent settings at rest settings: PC 25, PEEP10, FiO2 60%.  FEN/Renal: Creatinine  Lab Results   Component Value Date    CR 1.80 03/03/2018   , stable  UOP adequate  Heme: Hemoglobin stable .  Goals: if O2 sat >85% Hgb may drift to 7-8.  If O2 Sat <85% keep Hgb 10-12.  ID:      All pertinent labs, imaging studies, physical exam and medications have been reviewed by me.     This patient requires continued ICU monitoring and cares.  I apprecitate the input of the SICU team for these issues.  I have discussed patient care and treatment plan with the SICU team and the patient's family.     ECMO [id number]    Toby Hernandez MD PhD

## 2018-03-03 NOTE — ANESTHESIA PROCEDURE NOTES
OMA Probe Insertion Note  Probe Inserted by: SHELBY SUBRAMANIAN   Insertion method: OMA probe easily inserted   Bite block used:   No      Probe type:  Adult 3D   Insertion complications: No complications.      Billing for OMA report is being done by Anesthesiology

## 2018-03-04 ENCOUNTER — APPOINTMENT (OUTPATIENT)
Dept: GENERAL RADIOLOGY | Facility: CLINIC | Age: 58
DRG: 003 | End: 2018-03-04
Attending: INTERNAL MEDICINE
Payer: COMMERCIAL

## 2018-03-04 ENCOUNTER — APPOINTMENT (OUTPATIENT)
Dept: GENERAL RADIOLOGY | Facility: CLINIC | Age: 58
DRG: 003 | End: 2018-03-04
Attending: THORACIC SURGERY (CARDIOTHORACIC VASCULAR SURGERY)
Payer: COMMERCIAL

## 2018-03-04 ENCOUNTER — RESULTS ONLY (OUTPATIENT)
Dept: CARDIOLOGY | Facility: CLINIC | Age: 58
End: 2018-03-04

## 2018-03-04 LAB
ACID FAST STN SPEC QL: NORMAL
ACID FAST STN SPEC QL: NORMAL
ALBUMIN SERPL-MCNC: 2.5 G/DL (ref 3.4–5)
ALBUMIN UR-MCNC: 30 MG/DL
ALT SERPL W P-5'-P-CCNC: 40 U/L (ref 0–50)
AMORPH CRY #/AREA URNS HPF: ABNORMAL /HPF
ANION GAP SERPL CALCULATED.3IONS-SCNC: 11 MMOL/L (ref 3–14)
ANION GAP SERPL CALCULATED.3IONS-SCNC: 12 MMOL/L (ref 3–14)
ANION GAP SERPL CALCULATED.3IONS-SCNC: 12 MMOL/L (ref 3–14)
ANION GAP SERPL CALCULATED.3IONS-SCNC: 13 MMOL/L (ref 3–14)
ANION GAP SERPL CALCULATED.3IONS-SCNC: 8 MMOL/L (ref 3–14)
ANION GAP SERPL CALCULATED.3IONS-SCNC: 9 MMOL/L (ref 3–14)
APPEARANCE UR: ABNORMAL
APTT PPP: 26 SEC (ref 22–37)
APTT PPP: 26 SEC (ref 22–37)
APTT PPP: 27 SEC (ref 22–37)
APTT PPP: 28 SEC (ref 22–37)
AT III ACT/NOR PPP CHRO: 94 % (ref 85–135)
BACTERIA #/AREA URNS HPF: ABNORMAL /HPF
BASE DEFICIT BLDA-SCNC: 1.5 MMOL/L
BASE DEFICIT BLDA-SCNC: 2.1 MMOL/L
BASE DEFICIT BLDA-SCNC: 2.3 MMOL/L
BASE DEFICIT BLDA-SCNC: 2.9 MMOL/L
BASE DEFICIT BLDA-SCNC: 3 MMOL/L
BASE DEFICIT BLDA-SCNC: 3.5 MMOL/L
BASE DEFICIT BLDA-SCNC: 3.5 MMOL/L
BASE DEFICIT BLDA-SCNC: 3.6 MMOL/L
BASE DEFICIT BLDV-SCNC: 1.5 MMOL/L
BASE DEFICIT BLDV-SCNC: 2.3 MMOL/L
BASE DEFICIT BLDV-SCNC: 2.8 MMOL/L
BASE DEFICIT BLDV-SCNC: 3.3 MMOL/L
BASE DEFICIT BLDV-SCNC: 8.6 MMOL/L
BASOPHILS # BLD AUTO: 0 10E9/L (ref 0–0.2)
BASOPHILS NFR BLD AUTO: 0.1 %
BILIRUB UR QL STRIP: NEGATIVE
BUN SERPL-MCNC: 67 MG/DL (ref 7–30)
BUN SERPL-MCNC: 67 MG/DL (ref 7–30)
BUN SERPL-MCNC: 68 MG/DL (ref 7–30)
BUN SERPL-MCNC: 69 MG/DL (ref 7–30)
BUN SERPL-MCNC: 71 MG/DL (ref 7–30)
BUN SERPL-MCNC: 73 MG/DL (ref 7–30)
CA-I BLD-MCNC: 4.6 MG/DL (ref 4.4–5.2)
CALCIUM SERPL-MCNC: 7.2 MG/DL (ref 8.5–10.1)
CALCIUM SERPL-MCNC: 7.4 MG/DL (ref 8.5–10.1)
CALCIUM SERPL-MCNC: 7.4 MG/DL (ref 8.5–10.1)
CALCIUM SERPL-MCNC: 7.6 MG/DL (ref 8.5–10.1)
CALCIUM SERPL-MCNC: 7.8 MG/DL (ref 8.5–10.1)
CALCIUM SERPL-MCNC: 7.8 MG/DL (ref 8.5–10.1)
CHLORIDE SERPL-SCNC: 114 MMOL/L (ref 94–109)
CHLORIDE SERPL-SCNC: 117 MMOL/L (ref 94–109)
CHLORIDE SERPL-SCNC: 117 MMOL/L (ref 94–109)
CHLORIDE SERPL-SCNC: 118 MMOL/L (ref 94–109)
CHLORIDE SERPL-SCNC: 119 MMOL/L (ref 94–109)
CHLORIDE SERPL-SCNC: 119 MMOL/L (ref 94–109)
CK SERPL-CCNC: 207 U/L (ref 30–225)
CO2 SERPL-SCNC: 20 MMOL/L (ref 20–32)
CO2 SERPL-SCNC: 22 MMOL/L (ref 20–32)
CO2 SERPL-SCNC: 22 MMOL/L (ref 20–32)
COLOR UR AUTO: YELLOW
CREAT SERPL-MCNC: 2.06 MG/DL (ref 0.52–1.04)
CREAT SERPL-MCNC: 2.08 MG/DL (ref 0.52–1.04)
CREAT SERPL-MCNC: 2.09 MG/DL (ref 0.52–1.04)
CREAT SERPL-MCNC: 2.12 MG/DL (ref 0.52–1.04)
CREAT SERPL-MCNC: 2.13 MG/DL (ref 0.52–1.04)
CREAT SERPL-MCNC: 2.14 MG/DL (ref 0.52–1.04)
D DIMER PPP FEU-MCNC: 0.5 UG/ML FEU (ref 0–0.5)
D DIMER PPP FEU-MCNC: 0.8 UG/ML FEU (ref 0–0.5)
DIFFERENTIAL METHOD BLD: ABNORMAL
EOSINOPHIL # BLD AUTO: 0 10E9/L (ref 0–0.7)
EOSINOPHIL NFR BLD AUTO: 0 %
ERYTHROCYTE [DISTWIDTH] IN BLOOD BY AUTOMATED COUNT: 15.6 % (ref 10–15)
ERYTHROCYTE [DISTWIDTH] IN BLOOD BY AUTOMATED COUNT: 15.8 % (ref 10–15)
ERYTHROCYTE [DISTWIDTH] IN BLOOD BY AUTOMATED COUNT: 16.1 % (ref 10–15)
ERYTHROCYTE [DISTWIDTH] IN BLOOD BY AUTOMATED COUNT: 16.2 % (ref 10–15)
ERYTHROCYTE [DISTWIDTH] IN BLOOD BY AUTOMATED COUNT: 16.2 % (ref 10–15)
ERYTHROCYTE [DISTWIDTH] IN BLOOD BY AUTOMATED COUNT: 16.4 % (ref 10–15)
FIBRINOGEN PPP-MCNC: 267 MG/DL (ref 200–420)
FIBRINOGEN PPP-MCNC: 273 MG/DL (ref 200–420)
FIBRINOGEN PPP-MCNC: 274 MG/DL (ref 200–420)
FIBRINOGEN PPP-MCNC: 282 MG/DL (ref 200–420)
FIBRINOGEN PPP-MCNC: 304 MG/DL (ref 200–420)
FIBRINOGEN PPP-MCNC: 311 MG/DL (ref 200–420)
GFR SERPL CREATININE-BSD FRML MDRD: 24 ML/MIN/1.7M2
GFR SERPL CREATININE-BSD FRML MDRD: 25 ML/MIN/1.7M2
GLUCOSE BLD-MCNC: 170 MG/DL (ref 70–99)
GLUCOSE BLDC GLUCOMTR-MCNC: 100 MG/DL (ref 70–99)
GLUCOSE BLDC GLUCOMTR-MCNC: 105 MG/DL (ref 70–99)
GLUCOSE BLDC GLUCOMTR-MCNC: 118 MG/DL (ref 70–99)
GLUCOSE BLDC GLUCOMTR-MCNC: 123 MG/DL (ref 70–99)
GLUCOSE BLDC GLUCOMTR-MCNC: 126 MG/DL (ref 70–99)
GLUCOSE BLDC GLUCOMTR-MCNC: 131 MG/DL (ref 70–99)
GLUCOSE BLDC GLUCOMTR-MCNC: 134 MG/DL (ref 70–99)
GLUCOSE BLDC GLUCOMTR-MCNC: 134 MG/DL (ref 70–99)
GLUCOSE BLDC GLUCOMTR-MCNC: 147 MG/DL (ref 70–99)
GLUCOSE BLDC GLUCOMTR-MCNC: 149 MG/DL (ref 70–99)
GLUCOSE BLDC GLUCOMTR-MCNC: 154 MG/DL (ref 70–99)
GLUCOSE BLDC GLUCOMTR-MCNC: 155 MG/DL (ref 70–99)
GLUCOSE BLDC GLUCOMTR-MCNC: 159 MG/DL (ref 70–99)
GLUCOSE BLDC GLUCOMTR-MCNC: 159 MG/DL (ref 70–99)
GLUCOSE SERPL-MCNC: 111 MG/DL (ref 70–99)
GLUCOSE SERPL-MCNC: 133 MG/DL (ref 70–99)
GLUCOSE SERPL-MCNC: 141 MG/DL (ref 70–99)
GLUCOSE SERPL-MCNC: 148 MG/DL (ref 70–99)
GLUCOSE SERPL-MCNC: 158 MG/DL (ref 70–99)
GLUCOSE SERPL-MCNC: 158 MG/DL (ref 70–99)
GLUCOSE UR STRIP-MCNC: NEGATIVE MG/DL
HCO3 BLD-SCNC: 20 MMOL/L (ref 21–28)
HCO3 BLD-SCNC: 21 MMOL/L (ref 21–28)
HCO3 BLD-SCNC: 21 MMOL/L (ref 21–28)
HCO3 BLD-SCNC: 22 MMOL/L (ref 21–28)
HCO3 BLD-SCNC: 22 MMOL/L (ref 21–28)
HCO3 BLDV-SCNC: 15 MMOL/L (ref 21–28)
HCO3 BLDV-SCNC: 21 MMOL/L (ref 21–28)
HCO3 BLDV-SCNC: 22 MMOL/L (ref 21–28)
HCO3 BLDV-SCNC: 22 MMOL/L (ref 21–28)
HCO3 BLDV-SCNC: 23 MMOL/L (ref 21–28)
HCT VFR BLD AUTO: 24.9 % (ref 35–47)
HCT VFR BLD AUTO: 25.2 % (ref 35–47)
HCT VFR BLD AUTO: 26.2 % (ref 35–47)
HCT VFR BLD AUTO: 26.8 % (ref 35–47)
HCT VFR BLD AUTO: 26.9 % (ref 35–47)
HCT VFR BLD AUTO: 27.2 % (ref 35–47)
HGB BLD-MCNC: 8.3 G/DL (ref 11.7–15.7)
HGB BLD-MCNC: 8.5 G/DL (ref 11.7–15.7)
HGB BLD-MCNC: 8.9 G/DL (ref 11.7–15.7)
HGB BLD-MCNC: 9.2 G/DL (ref 11.7–15.7)
HGB BLD-MCNC: 9.4 G/DL (ref 11.7–15.7)
HGB BLD-MCNC: 9.4 G/DL (ref 11.7–15.7)
HGB FREE PLAS-MCNC: <30 MG/DL
HGB UR QL STRIP: ABNORMAL
IMM GRANULOCYTES # BLD: 0.1 10E9/L (ref 0–0.4)
IMM GRANULOCYTES NFR BLD: 0.5 %
INR PPP: 1.12 (ref 0.86–1.14)
INR PPP: 1.15 (ref 0.86–1.14)
INR PPP: 1.15 (ref 0.86–1.14)
INR PPP: 1.16 (ref 0.86–1.14)
INR PPP: 1.18 (ref 0.86–1.14)
INR PPP: 1.19 (ref 0.86–1.14)
INTERPRETATION ECG - MUSE: NORMAL
KETONES UR STRIP-MCNC: NEGATIVE MG/DL
LACTATE BLD-SCNC: 0.8 MMOL/L (ref 0.7–2)
LACTATE BLD-SCNC: 0.9 MMOL/L (ref 0.7–2)
LACTATE BLD-SCNC: 0.9 MMOL/L (ref 0.7–2)
LACTATE BLD-SCNC: 1 MMOL/L (ref 0.7–2)
LACTATE BLD-SCNC: 1.1 MMOL/L (ref 0.7–2)
LEUKOCYTE ESTERASE UR QL STRIP: NEGATIVE
LYMPHOCYTES # BLD AUTO: 0.4 10E9/L (ref 0.8–5.3)
LYMPHOCYTES NFR BLD AUTO: 2.5 %
MAGNESIUM SERPL-MCNC: 2.5 MG/DL (ref 1.6–2.3)
MAGNESIUM SERPL-MCNC: 2.8 MG/DL (ref 1.6–2.3)
MCH RBC QN AUTO: 29.9 PG (ref 26.5–33)
MCH RBC QN AUTO: 30.1 PG (ref 26.5–33)
MCH RBC QN AUTO: 30.3 PG (ref 26.5–33)
MCH RBC QN AUTO: 30.6 PG (ref 26.5–33)
MCH RBC QN AUTO: 30.6 PG (ref 26.5–33)
MCH RBC QN AUTO: 30.7 PG (ref 26.5–33)
MCHC RBC AUTO-ENTMCNC: 33.3 G/DL (ref 31.5–36.5)
MCHC RBC AUTO-ENTMCNC: 33.7 G/DL (ref 31.5–36.5)
MCHC RBC AUTO-ENTMCNC: 34 G/DL (ref 31.5–36.5)
MCHC RBC AUTO-ENTMCNC: 34.2 G/DL (ref 31.5–36.5)
MCHC RBC AUTO-ENTMCNC: 34.6 G/DL (ref 31.5–36.5)
MCHC RBC AUTO-ENTMCNC: 35.1 G/DL (ref 31.5–36.5)
MCV RBC AUTO: 87 FL (ref 78–100)
MCV RBC AUTO: 87 FL (ref 78–100)
MCV RBC AUTO: 89 FL (ref 78–100)
MCV RBC AUTO: 90 FL (ref 78–100)
MCV RBC AUTO: 90 FL (ref 78–100)
MCV RBC AUTO: 91 FL (ref 78–100)
MONOCYTES # BLD AUTO: 1.6 10E9/L (ref 0–1.3)
MONOCYTES NFR BLD AUTO: 9.6 %
MUCOUS THREADS #/AREA URNS LPF: PRESENT /LPF
NEUTROPHILS # BLD AUTO: 14.9 10E9/L (ref 1.6–8.3)
NEUTROPHILS NFR BLD AUTO: 87.3 %
NITRATE UR QL: NEGATIVE
NRBC # BLD AUTO: 0.2 10*3/UL
NRBC BLD AUTO-RTO: 1 /100
O2/TOTAL GAS SETTING VFR VENT: 40 %
O2/TOTAL GAS SETTING VFR VENT: 40 %
O2/TOTAL GAS SETTING VFR VENT: 45 %
O2/TOTAL GAS SETTING VFR VENT: 50 %
O2/TOTAL GAS SETTING VFR VENT: 50 %
OXYHGB MFR BLD: 96 % (ref 92–100)
OXYHGB MFR BLD: 97 % (ref 92–100)
OXYHGB MFR BLD: 98 % (ref 92–100)
OXYHGB MFR BLDV: 59 %
OXYHGB MFR BLDV: 60 %
OXYHGB MFR BLDV: 60 %
OXYHGB MFR BLDV: 64 %
OXYHGB MFR BLDV: 64 %
PCO2 BLD: 29 MM HG (ref 35–45)
PCO2 BLD: 30 MM HG (ref 35–45)
PCO2 BLD: 31 MM HG (ref 35–45)
PCO2 BLD: 32 MM HG (ref 35–45)
PCO2 BLD: 33 MM HG (ref 35–45)
PCO2 BLDV: 25 MM HG (ref 40–50)
PCO2 BLDV: 33 MM HG (ref 40–50)
PCO2 BLDV: 34 MM HG (ref 40–50)
PCO2 BLDV: 36 MM HG (ref 40–50)
PCO2 BLDV: 36 MM HG (ref 40–50)
PH BLD: 7.43 PH (ref 7.35–7.45)
PH BLD: 7.44 PH (ref 7.35–7.45)
PH BLD: 7.45 PH (ref 7.35–7.45)
PH BLDV: 7.39 PH (ref 7.32–7.43)
PH BLDV: 7.4 PH (ref 7.32–7.43)
PH BLDV: 7.41 PH (ref 7.32–7.43)
PH BLDV: 7.41 PH (ref 7.32–7.43)
PH BLDV: 7.42 PH (ref 7.32–7.43)
PH UR STRIP: 5.5 PH (ref 5–7)
PHOSPHATE SERPL-MCNC: 5 MG/DL (ref 2.5–4.5)
PLATELET # BLD AUTO: 146 10E9/L (ref 150–450)
PLATELET # BLD AUTO: 147 10E9/L (ref 150–450)
PLATELET # BLD AUTO: 149 10E9/L (ref 150–450)
PLATELET # BLD AUTO: 151 10E9/L (ref 150–450)
PLATELET # BLD AUTO: 155 10E9/L (ref 150–450)
PLATELET # BLD AUTO: 160 10E9/L (ref 150–450)
PO2 BLD: 138 MM HG (ref 80–105)
PO2 BLD: 140 MM HG (ref 80–105)
PO2 BLD: 147 MM HG (ref 80–105)
PO2 BLD: 151 MM HG (ref 80–105)
PO2 BLD: 155 MM HG (ref 80–105)
PO2 BLD: 179 MM HG (ref 80–105)
PO2 BLD: 199 MM HG (ref 80–105)
PO2 BLD: 93 MM HG (ref 80–105)
PO2 BLDV: 31 MM HG (ref 25–47)
PO2 BLDV: 32 MM HG (ref 25–47)
PO2 BLDV: 32 MM HG (ref 25–47)
PO2 BLDV: 34 MM HG (ref 25–47)
PO2 BLDV: 34 MM HG (ref 25–47)
POTASSIUM SERPL-SCNC: 4.2 MMOL/L (ref 3.4–5.3)
POTASSIUM SERPL-SCNC: 4.3 MMOL/L (ref 3.4–5.3)
POTASSIUM SERPL-SCNC: 4.3 MMOL/L (ref 3.4–5.3)
RBC # BLD AUTO: 2.76 10E12/L (ref 3.8–5.2)
RBC # BLD AUTO: 2.78 10E12/L (ref 3.8–5.2)
RBC # BLD AUTO: 2.9 10E12/L (ref 3.8–5.2)
RBC # BLD AUTO: 3.04 10E12/L (ref 3.8–5.2)
RBC # BLD AUTO: 3.07 10E12/L (ref 3.8–5.2)
RBC # BLD AUTO: 3.14 10E12/L (ref 3.8–5.2)
RBC #/AREA URNS AUTO: 4 /HPF (ref 0–2)
SODIUM SERPL-SCNC: 146 MMOL/L (ref 133–144)
SODIUM SERPL-SCNC: 149 MMOL/L (ref 133–144)
SODIUM SERPL-SCNC: 150 MMOL/L (ref 133–144)
SOURCE: ABNORMAL
SP GR UR STRIP: 1.03 (ref 1–1.03)
SPECIMEN SOURCE: NORMAL
SQUAMOUS #/AREA URNS AUTO: <1 /HPF (ref 0–1)
TACROLIMUS BLD-MCNC: 9.9 UG/L (ref 5–15)
TME LAST DOSE: NORMAL H
TRANS CELLS #/AREA URNS HPF: 1 /HPF (ref 0–1)
TRIGL SERPL-MCNC: 191 MG/DL
UROBILINOGEN UR STRIP-MCNC: NORMAL MG/DL (ref 0–2)
VANCOMYCIN SERPL-MCNC: 17.6 MG/L
WBC # BLD AUTO: 17 10E9/L (ref 4–11)
WBC # BLD AUTO: 17.1 10E9/L (ref 4–11)
WBC # BLD AUTO: 18.1 10E9/L (ref 4–11)
WBC # BLD AUTO: 18.1 10E9/L (ref 4–11)
WBC # BLD AUTO: 18.2 10E9/L (ref 4–11)
WBC # BLD AUTO: 18.4 10E9/L (ref 4–11)
WBC #/AREA URNS AUTO: 5 /HPF (ref 0–5)

## 2018-03-04 PROCEDURE — 25000132 ZZH RX MED GY IP 250 OP 250 PS 637: Performed by: ANESTHESIOLOGY

## 2018-03-04 PROCEDURE — 81001 URINALYSIS AUTO W/SCOPE: CPT | Performed by: ANESTHESIOLOGY

## 2018-03-04 PROCEDURE — 94645 CONT INHLJ TX EACH ADDL HOUR: CPT

## 2018-03-04 PROCEDURE — 93010 ELECTROCARDIOGRAM REPORT: CPT | Performed by: INTERNAL MEDICINE

## 2018-03-04 PROCEDURE — 85027 COMPLETE CBC AUTOMATED: CPT | Performed by: THORACIC SURGERY (CARDIOTHORACIC VASCULAR SURGERY)

## 2018-03-04 PROCEDURE — 71045 X-RAY EXAM CHEST 1 VIEW: CPT

## 2018-03-04 PROCEDURE — 94003 VENT MGMT INPAT SUBQ DAY: CPT

## 2018-03-04 PROCEDURE — 93005 ELECTROCARDIOGRAM TRACING: CPT

## 2018-03-04 PROCEDURE — 87205 SMEAR GRAM STAIN: CPT | Performed by: ANESTHESIOLOGY

## 2018-03-04 PROCEDURE — 83051 HEMOGLOBIN PLASMA: CPT | Performed by: THORACIC SURGERY (CARDIOTHORACIC VASCULAR SURGERY)

## 2018-03-04 PROCEDURE — 82805 BLOOD GASES W/O2 SATURATION: CPT | Performed by: ANESTHESIOLOGY

## 2018-03-04 PROCEDURE — 25000132 ZZH RX MED GY IP 250 OP 250 PS 637: Performed by: THORACIC SURGERY (CARDIOTHORACIC VASCULAR SURGERY)

## 2018-03-04 PROCEDURE — 00000146 ZZHCL STATISTIC GLUCOSE BY METER IP

## 2018-03-04 PROCEDURE — 25000131 ZZH RX MED GY IP 250 OP 636 PS 637: Performed by: THORACIC SURGERY (CARDIOTHORACIC VASCULAR SURGERY)

## 2018-03-04 PROCEDURE — 85027 COMPLETE CBC AUTOMATED: CPT | Performed by: ANESTHESIOLOGY

## 2018-03-04 PROCEDURE — 99291 CRITICAL CARE FIRST HOUR: CPT | Mod: GC | Performed by: ANESTHESIOLOGY

## 2018-03-04 PROCEDURE — 36415 COLL VENOUS BLD VENIPUNCTURE: CPT | Performed by: ANESTHESIOLOGY

## 2018-03-04 PROCEDURE — 80197 ASSAY OF TACROLIMUS: CPT | Performed by: THORACIC SURGERY (CARDIOTHORACIC VASCULAR SURGERY)

## 2018-03-04 PROCEDURE — 80069 RENAL FUNCTION PANEL: CPT | Performed by: THORACIC SURGERY (CARDIOTHORACIC VASCULAR SURGERY)

## 2018-03-04 PROCEDURE — 80202 ASSAY OF VANCOMYCIN: CPT | Performed by: THORACIC SURGERY (CARDIOTHORACIC VASCULAR SURGERY)

## 2018-03-04 PROCEDURE — 25000128 H RX IP 250 OP 636: Performed by: STUDENT IN AN ORGANIZED HEALTH CARE EDUCATION/TRAINING PROGRAM

## 2018-03-04 PROCEDURE — 85610 PROTHROMBIN TIME: CPT | Performed by: THORACIC SURGERY (CARDIOTHORACIC VASCULAR SURGERY)

## 2018-03-04 PROCEDURE — 87040 BLOOD CULTURE FOR BACTERIA: CPT | Performed by: ANESTHESIOLOGY

## 2018-03-04 PROCEDURE — 82330 ASSAY OF CALCIUM: CPT | Performed by: ANESTHESIOLOGY

## 2018-03-04 PROCEDURE — 25000128 H RX IP 250 OP 636: Performed by: INTERNAL MEDICINE

## 2018-03-04 PROCEDURE — 94667 MNPJ CHEST WALL 1ST: CPT

## 2018-03-04 PROCEDURE — 83735 ASSAY OF MAGNESIUM: CPT | Performed by: THORACIC SURGERY (CARDIOTHORACIC VASCULAR SURGERY)

## 2018-03-04 PROCEDURE — 85300 ANTITHROMBIN III ACTIVITY: CPT | Performed by: THORACIC SURGERY (CARDIOTHORACIC VASCULAR SURGERY)

## 2018-03-04 PROCEDURE — 27210995 ZZH RX 272: Performed by: STUDENT IN AN ORGANIZED HEALTH CARE EDUCATION/TRAINING PROGRAM

## 2018-03-04 PROCEDURE — 83735 ASSAY OF MAGNESIUM: CPT | Performed by: ANESTHESIOLOGY

## 2018-03-04 PROCEDURE — 40000014 ZZH STATISTIC ARTERIAL MONITORING DAILY

## 2018-03-04 PROCEDURE — 83605 ASSAY OF LACTIC ACID: CPT | Performed by: ANESTHESIOLOGY

## 2018-03-04 PROCEDURE — 25000128 H RX IP 250 OP 636: Performed by: ANESTHESIOLOGY

## 2018-03-04 PROCEDURE — 80048 BASIC METABOLIC PNL TOTAL CA: CPT | Performed by: THORACIC SURGERY (CARDIOTHORACIC VASCULAR SURGERY)

## 2018-03-04 PROCEDURE — 40000275 ZZH STATISTIC RCP TIME EA 10 MIN

## 2018-03-04 PROCEDURE — 40000048 ZZH STATISTIC DAILY SWAN MONITORING

## 2018-03-04 PROCEDURE — 87070 CULTURE OTHR SPECIMN AEROBIC: CPT | Performed by: ANESTHESIOLOGY

## 2018-03-04 PROCEDURE — 94640 AIRWAY INHALATION TREATMENT: CPT | Mod: 76

## 2018-03-04 PROCEDURE — 27210429 ZZH NUTRITION PRODUCT INTERMEDIATE LITER

## 2018-03-04 PROCEDURE — 94668 MNPJ CHEST WALL SBSQ: CPT

## 2018-03-04 PROCEDURE — 85730 THROMBOPLASTIN TIME PARTIAL: CPT | Performed by: ANESTHESIOLOGY

## 2018-03-04 PROCEDURE — 82803 BLOOD GASES ANY COMBINATION: CPT | Performed by: ANESTHESIOLOGY

## 2018-03-04 PROCEDURE — 80048 BASIC METABOLIC PNL TOTAL CA: CPT | Performed by: ANESTHESIOLOGY

## 2018-03-04 PROCEDURE — 85384 FIBRINOGEN ACTIVITY: CPT | Performed by: THORACIC SURGERY (CARDIOTHORACIC VASCULAR SURGERY)

## 2018-03-04 PROCEDURE — 82803 BLOOD GASES ANY COMBINATION: CPT | Performed by: SURGERY

## 2018-03-04 PROCEDURE — 25000128 H RX IP 250 OP 636: Performed by: THORACIC SURGERY (CARDIOTHORACIC VASCULAR SURGERY)

## 2018-03-04 PROCEDURE — 85730 THROMBOPLASTIN TIME PARTIAL: CPT | Performed by: THORACIC SURGERY (CARDIOTHORACIC VASCULAR SURGERY)

## 2018-03-04 PROCEDURE — 85025 COMPLETE CBC W/AUTO DIFF WBC: CPT | Performed by: THORACIC SURGERY (CARDIOTHORACIC VASCULAR SURGERY)

## 2018-03-04 PROCEDURE — 84460 ALANINE AMINO (ALT) (SGPT): CPT | Performed by: THORACIC SURGERY (CARDIOTHORACIC VASCULAR SURGERY)

## 2018-03-04 PROCEDURE — 25000125 ZZHC RX 250: Performed by: ANESTHESIOLOGY

## 2018-03-04 PROCEDURE — 25000132 ZZH RX MED GY IP 250 OP 250 PS 637: Performed by: PHYSICIAN ASSISTANT

## 2018-03-04 PROCEDURE — 25000132 ZZH RX MED GY IP 250 OP 250 PS 637: Performed by: SURGERY

## 2018-03-04 PROCEDURE — 25000125 ZZHC RX 250: Performed by: SURGERY

## 2018-03-04 PROCEDURE — 85610 PROTHROMBIN TIME: CPT | Performed by: ANESTHESIOLOGY

## 2018-03-04 PROCEDURE — 85379 FIBRIN DEGRADATION QUANT: CPT | Performed by: THORACIC SURGERY (CARDIOTHORACIC VASCULAR SURGERY)

## 2018-03-04 PROCEDURE — 25000125 ZZHC RX 250: Performed by: THORACIC SURGERY (CARDIOTHORACIC VASCULAR SURGERY)

## 2018-03-04 PROCEDURE — 20000004 ZZH R&B ICU UMMC

## 2018-03-04 PROCEDURE — 25000128 H RX IP 250 OP 636: Performed by: SURGERY

## 2018-03-04 PROCEDURE — 40000986 XR ABDOMEN PORT 1 VW

## 2018-03-04 PROCEDURE — 82947 ASSAY GLUCOSE BLOOD QUANT: CPT | Performed by: ANESTHESIOLOGY

## 2018-03-04 PROCEDURE — 84478 ASSAY OF TRIGLYCERIDES: CPT | Performed by: THORACIC SURGERY (CARDIOTHORACIC VASCULAR SURGERY)

## 2018-03-04 PROCEDURE — 85384 FIBRINOGEN ACTIVITY: CPT | Performed by: ANESTHESIOLOGY

## 2018-03-04 PROCEDURE — 40000196 ZZH STATISTIC RAPCV CVP MONITORING

## 2018-03-04 PROCEDURE — 82550 ASSAY OF CK (CPK): CPT | Performed by: THORACIC SURGERY (CARDIOTHORACIC VASCULAR SURGERY)

## 2018-03-04 PROCEDURE — 82805 BLOOD GASES W/O2 SATURATION: CPT | Performed by: SURGERY

## 2018-03-04 RX ORDER — PIPERACILLIN SODIUM, TAZOBACTAM SODIUM 3; .375 G/15ML; G/15ML
3.38 INJECTION, POWDER, LYOPHILIZED, FOR SOLUTION INTRAVENOUS EVERY 6 HOURS
Status: DISCONTINUED | OUTPATIENT
Start: 2018-03-04 | End: 2018-03-06

## 2018-03-04 RX ORDER — SULFAMETHOXAZOLE AND TRIMETHOPRIM 200; 40 MG/5ML; MG/5ML
10 SUSPENSION ORAL
Status: DISCONTINUED | OUTPATIENT
Start: 2018-03-06 | End: 2018-03-09

## 2018-03-04 RX ORDER — HEPARIN SODIUM 5000 [USP'U]/.5ML
5000 INJECTION, SOLUTION INTRAVENOUS; SUBCUTANEOUS EVERY 12 HOURS
Status: DISCONTINUED | OUTPATIENT
Start: 2018-03-04 | End: 2018-03-05

## 2018-03-04 RX ORDER — POLYETHYLENE GLYCOL 3350 17 G/17G
17 POWDER, FOR SOLUTION ORAL DAILY
Status: DISCONTINUED | OUTPATIENT
Start: 2018-03-04 | End: 2018-03-05

## 2018-03-04 RX ORDER — SULFAMETHOXAZOLE AND TRIMETHOPRIM 400; 80 MG/1; MG/1
1 TABLET ORAL
Status: DISCONTINUED | OUTPATIENT
Start: 2018-03-06 | End: 2018-03-09

## 2018-03-04 RX ORDER — SILDENAFIL 10 MG/ML
20 POWDER, FOR SUSPENSION ORAL EVERY 8 HOURS
Status: DISCONTINUED | OUTPATIENT
Start: 2018-03-04 | End: 2018-03-04

## 2018-03-04 RX ORDER — AMPICILLIN AND SULBACTAM 1; .5 G/1; G/1
1.5 INJECTION, POWDER, FOR SOLUTION INTRAMUSCULAR; INTRAVENOUS EVERY 12 HOURS
Status: DISCONTINUED | OUTPATIENT
Start: 2018-03-04 | End: 2018-03-04

## 2018-03-04 RX ORDER — SILDENAFIL 10 MG/ML
10 POWDER, FOR SUSPENSION ORAL EVERY 8 HOURS
Status: DISCONTINUED | OUTPATIENT
Start: 2018-03-04 | End: 2018-03-19

## 2018-03-04 RX ADMIN — DOCUSATE SODIUM 100 MG: 50 LIQUID ORAL at 20:09

## 2018-03-04 RX ADMIN — Medication 1 PACKET: at 20:08

## 2018-03-04 RX ADMIN — MINERAL OIL AND WHITE PETROLATUM: 150; 830 OINTMENT OPHTHALMIC at 04:19

## 2018-03-04 RX ADMIN — HEPARIN SODIUM 5000 UNITS: 5000 INJECTION, SOLUTION INTRAVENOUS; SUBCUTANEOUS at 09:19

## 2018-03-04 RX ADMIN — NYSTATIN 1000000 UNITS: 500000 SUSPENSION ORAL at 20:19

## 2018-03-04 RX ADMIN — AMPICILLIN SODIUM AND SULBACTAM SODIUM 1.5 G: 1; .5 INJECTION, POWDER, FOR SOLUTION INTRAMUSCULAR; INTRAVENOUS at 11:00

## 2018-03-04 RX ADMIN — EPINEPHRINE 0.07 MCG/KG/MIN: 1 INJECTION PARENTERAL at 19:18

## 2018-03-04 RX ADMIN — PIPERACILLIN AND TAZOBACTAM 3.38 G: 3; .375 INJECTION, POWDER, LYOPHILIZED, FOR SOLUTION INTRAVENOUS; PARENTERAL at 14:17

## 2018-03-04 RX ADMIN — MINERAL OIL AND WHITE PETROLATUM: 150; 830 OINTMENT OPHTHALMIC at 21:47

## 2018-03-04 RX ADMIN — EPOPROSTENOL 20 NG/KG/MIN: 1.5 INJECTION, POWDER, LYOPHILIZED, FOR SOLUTION INTRAVENOUS at 19:15

## 2018-03-04 RX ADMIN — ALBUTEROL SULFATE 6 PUFF: 90 AEROSOL, METERED RESPIRATORY (INHALATION) at 00:12

## 2018-03-04 RX ADMIN — EPOPROSTENOL 20 NG/KG/MIN: 1.5 INJECTION, POWDER, LYOPHILIZED, FOR SOLUTION INTRAVENOUS at 11:32

## 2018-03-04 RX ADMIN — SILDENAFIL CITRATE 10 MG: 10 POWDER, FOR SUSPENSION ORAL at 17:48

## 2018-03-04 RX ADMIN — EPOPROSTENOL 20 NG/KG/MIN: 1.5 INJECTION, POWDER, LYOPHILIZED, FOR SOLUTION INTRAVENOUS at 03:09

## 2018-03-04 RX ADMIN — HEPARIN SODIUM 5000 UNITS: 5000 INJECTION, SOLUTION INTRAVENOUS; SUBCUTANEOUS at 20:19

## 2018-03-04 RX ADMIN — NICARDIPINE HYDROCHLORIDE 1 MG/HR: 0.2 INJECTION, SOLUTION INTRAVENOUS at 01:00

## 2018-03-04 RX ADMIN — ALBUTEROL SULFATE 6 PUFF: 90 AEROSOL, METERED RESPIRATORY (INHALATION) at 17:13

## 2018-03-04 RX ADMIN — NICARDIPINE HYDROCHLORIDE 2.5 MG/HR: 0.2 INJECTION, SOLUTION INTRAVENOUS at 10:52

## 2018-03-04 RX ADMIN — MINERAL OIL AND WHITE PETROLATUM: 150; 830 OINTMENT OPHTHALMIC at 16:08

## 2018-03-04 RX ADMIN — PANTOPRAZOLE SODIUM 40 MG: 40 INJECTION, POWDER, FOR SOLUTION INTRAVENOUS at 09:19

## 2018-03-04 RX ADMIN — ALBUTEROL SULFATE 6 PUFF: 90 AEROSOL, METERED RESPIRATORY (INHALATION) at 12:54

## 2018-03-04 RX ADMIN — PROPOFOL 25 MCG/KG/MIN: 10 INJECTION, EMULSION INTRAVENOUS at 04:20

## 2018-03-04 RX ADMIN — SILDENAFIL CITRATE 20 MG: 10 POWDER, FOR SUSPENSION ORAL at 09:30

## 2018-03-04 RX ADMIN — Medication 1 PACKET: at 09:21

## 2018-03-04 RX ADMIN — NYSTATIN 1000000 UNITS: 500000 SUSPENSION ORAL at 09:19

## 2018-03-04 RX ADMIN — POLYETHYLENE GLYCOL 3350 17 G: 17 POWDER, FOR SOLUTION ORAL at 09:19

## 2018-03-04 RX ADMIN — ACETAMINOPHEN 975 MG: 325 TABLET, FILM COATED ORAL at 04:10

## 2018-03-04 RX ADMIN — ALBUTEROL SULFATE 6 PUFF: 90 AEROSOL, METERED RESPIRATORY (INHALATION) at 04:05

## 2018-03-04 RX ADMIN — DOCUSATE SODIUM 100 MG: 50 LIQUID ORAL at 09:20

## 2018-03-04 RX ADMIN — PIPERACILLIN AND TAZOBACTAM 3.38 G: 3; .375 INJECTION, POWDER, LYOPHILIZED, FOR SOLUTION INTRAVENOUS; PARENTERAL at 20:19

## 2018-03-04 RX ADMIN — Medication 5 ML: at 21:47

## 2018-03-04 RX ADMIN — MULTIVITAMIN 15 ML: LIQUID ORAL at 09:19

## 2018-03-04 RX ADMIN — PREDNISOLONE SODIUM PHOSPHATE 12.9 MG: 15 SOLUTION ORAL at 20:09

## 2018-03-04 RX ADMIN — PIPERACILLIN AND TAZOBACTAM 2.25 G: 2; .25 INJECTION, POWDER, LYOPHILIZED, FOR SOLUTION INTRAVENOUS; PARENTERAL at 04:19

## 2018-03-04 RX ADMIN — ACETAMINOPHEN 975 MG: 325 TABLET, FILM COATED ORAL at 12:12

## 2018-03-04 RX ADMIN — MYCOPHENOLATE MOFETIL 1500 MG: 200 POWDER, FOR SUSPENSION ORAL at 17:48

## 2018-03-04 RX ADMIN — HUMAN INSULIN 1 UNITS/HR: 100 INJECTION, SOLUTION SUBCUTANEOUS at 21:43

## 2018-03-04 RX ADMIN — ALBUTEROL SULFATE 6 PUFF: 90 AEROSOL, METERED RESPIRATORY (INHALATION) at 20:14

## 2018-03-04 RX ADMIN — NYSTATIN 1000000 UNITS: 500000 SUSPENSION ORAL at 16:07

## 2018-03-04 RX ADMIN — VANCOMYCIN HYDROCHLORIDE 750 MG: 10 INJECTION, POWDER, LYOPHILIZED, FOR SOLUTION INTRAVENOUS at 09:12

## 2018-03-04 RX ADMIN — NYSTATIN 1000000 UNITS: 500000 SUSPENSION ORAL at 12:12

## 2018-03-04 RX ADMIN — TACROLIMUS 15 MCG/HR: 5 INJECTION, SOLUTION INTRAVENOUS at 21:47

## 2018-03-04 RX ADMIN — PREDNISOLONE SODIUM PHOSPHATE 12.9 MG: 15 SOLUTION ORAL at 09:20

## 2018-03-04 RX ADMIN — MINERAL OIL AND WHITE PETROLATUM: 150; 830 OINTMENT OPHTHALMIC at 09:19

## 2018-03-04 RX ADMIN — FENTANYL CITRATE 100 MCG/HR: 50 INJECTION, SOLUTION INTRAMUSCULAR; INTRAVENOUS at 09:03

## 2018-03-04 RX ADMIN — MYCOPHENOLATE MOFETIL 1500 MG: 200 POWDER, FOR SUSPENSION ORAL at 09:21

## 2018-03-04 RX ADMIN — VASOPRESSIN 1.2 UNITS/HR: 20 INJECTION, SOLUTION INTRAMUSCULAR; SUBCUTANEOUS at 13:55

## 2018-03-04 RX ADMIN — ALBUTEROL SULFATE 6 PUFF: 90 AEROSOL, METERED RESPIRATORY (INHALATION) at 09:14

## 2018-03-04 NOTE — PROGRESS NOTES
CVTS PROGRESS NOTE  March 3, 2018      ASSESSMENT: Kecia Blue is a 57 year old female with PMH significant for dermatomyositis on immunosuppression, eronegative RA, ILD, and pulmonary hypertension, who was admitted for emergent lung transplant work-up on 2/21 for increasing shortness of breath and hypoxia now POD # 2 s/p emergent [facility] ECMO placement on 2/27/18. Now s/p bilateral lung transplant on 3/1 and VA ECMO decannulation on 3/3 with Dr. Hernandez.     TODAY'S PROGRESS/PLAN  - Flolan stable  - BMP q2h, monitor Cr and lytes  - Q2hr ABG, coagulation labs  - Plan for turndown in OR 3/3 with possible decannulation    PLAN:  Neuro/ pain/ sedation:  - Monitor neurological status. Notify the MD for any acute changes in exam.  - Fentanyl gtt for pain.  - Propofol gtt for sedation. (D/c midazolam 3/2)    Pulmonary care:  # Acute on chronic respiratory failure, worsening, s/p bilateral lung transplantation 3/1/2018  # ILD related to dermatomyositis, on transplant list  # Severe pulmonary hypertension (WHO class III)  # Pneumomediastinum, 2/22/2018  # VA ECMO decannulated 3/3   - Supplemental oxygen via ventilator to keep saturation above 92 %.  - Flolan 20mg/kg/min, wean as able, monitor CVPs  - Nitric oxide weaned off 3/1    Cardiovascular:  # Right heart failure  # Pulmonary HTN    - Monitor hemodynamic status.   - Most likely 2/2 pulmonary hypertension from ILD, but did not rule out PE - unlikely given hypotension occurred after intubation and did not occur at the same time as her worsening hypoxia.     GI care:   - NPO except ice chips and medications.  - NJ placed  - Trickle feeds    Fluids/ Electrolytes/ Nutrition:   - Periodic fluid bolus for IV fluid hydration  - Parenteral nutrition to start with placement of NG/NJ tube.    Renal/ Fluid Balance:  #RADHA  - Urine output is adequate so far.  - Cr trending up post-op, will continue to monitor, q2h BMP  - Will continue to monitor intake and  output.    Endocrine:    - Insulin gtt     ID/ Antibiotics:  - Anti-rejection and immunosuppressant medications per transplant team    Heme:     - Hemoglobin stable, trending down, Chest tube output has decreased will transfuse for Hgb less than 7.   - q2h Hgb     Prophylaxis:    - Mechanical prophylaxis for DVT.   - PPI    Lines/ tubes/ drains:  - LIJ MAC/swan, right brachial artery, femoral arterial line, PIV, ETT, Basilio, Chest tube    Disposition:  - CVICU.   - Plan for OR 3/3 for turndown and VA ECMO decannulation      Kwesi Schwartz MD  Anesthesiology Resident CA2, PGY3      ====================================    TODAY'S PROGRESS:   SUBJECTIVE:   - Lung transplant overnight, received 3 PRBCs, 1 FFP, one platelet s/p OR  - High chest tube output overnight  - To OR for VA ECMO decannulation 3/3    OBJECTIVE:   1. VITAL SIGNS:   Temp:  [96.1  F (35.6  C)-99.5  F (37.5  C)] 99.5  F (37.5  C)  Heart Rate:  [] 108  Resp:  [10-19] 19  MAP:  [57 mmHg-93 mmHg] 85 mmHg  Arterial Line BP: ()/(49-85) 116/72  FiO2 (%):  [50 %-80 %] 50 %  SpO2:  [87 %-100 %] 100 %  Ventilation Mode: CMV/AC  (Continuous Mandatory Ventilation/ Assist Control)  FiO2 (%): 50 %  Rate Set (breaths/minute): 16 breaths/min  Tidal Volume Set (mL): 400 mL  PEEP (cm H2O): 8 cmH2O  Oxygen Concentration (%): 50 %  Resp: 19    2. INTAKE/ OUTPUT:   I/O last 3 completed shifts:  In: 8080.31 [I.V.:3160.31; NG/GT:550]  Out: 3600 [Urine:340; Chest Tube:3260]    3. PHYSICAL EXAMINATION:   General: Sedated, NAD  Neuro: Sedated, moving mouth with oral cares, NAD. Moving all four limbs with weaned sedation on return from OR  Resp: Intubated, increasing lung sounds bilaterally  CV: RRR, No noted m/r/g  Abdomen: Soft, Non-distended  Incisions: c/d/i  Extremities: warm and well perfused    4. INVESTIGATIONS:   Arterial Blood Gases     Recent Labs  Lab 03/03/18  1537 03/03/18  1412 03/03/18  1204 03/03/18  1052   PH 7.46* 7.43 7.35 7.37   PCO2 30* 32*  36 36   PO2 205* 263* 244* 161*   HCO3 21 21 20* 21     Complete Blood Count     Recent Labs  Lab 03/03/18  1602 03/03/18  1448 03/03/18  1204 03/03/18  1052 03/03/18  1020  03/03/18  0329   WBC 19.4* 20.8* 22.4*  --   --   --  14.2*   HGB 9.6* 9.4* 11.8 11.2*  --   < > 10.0*   * 141* 87*  --  73*  --  90*   < > = values in this interval not displayed.  Basic Metabolic Panel    Recent Labs  Lab 03/03/18  1602 03/03/18  1448 03/03/18  1204 03/03/18  1052  03/03/18  0329   * 149* 150* 144  < > 147*   POTASSIUM 4.2 4.1 4.1 4.0  < > 4.2   CHLORIDE 116* 116* 119*  --   --  117*   CO2 24 23 20  --   --  20   BUN 60* 59* 59*  --   --  53*   CR 1.95* 1.95* 1.83*  --   --  1.80*   * 154* 202* 231*  < > 168*   < > = values in this interval not displayed.  Liver Function Tests    Recent Labs  Lab 03/03/18  1602 03/03/18  1412 03/03/18  1204 03/03/18  1020 03/03/18  0329  03/02/18  0330  03/01/18 2017 03/01/18  0356   AST  --   --   --   --  34  --   --   --  38  --  58*   ALT  --   --   --   --  29  --  38  --  65*  --  172*   ALKPHOS  --   --   --   --  27*  --   --   --  35*  --  37*   BILITOTAL  --   --   --   --  1.5*  --   --   --  1.2  --  0.7   ALBUMIN  --   --   --   --  2.4*  --  2.9*  --  1.7*  --  2.4*   INR 1.15* 1.20* 1.35* 1.66* 1.36*  < > 1.51*  < > 1.86*  < > 1.51*   < > = values in this interval not displayed.  Pancreatic Enzymes  No lab results found in last 7 days.  Coagulation Profile    Recent Labs  Lab 03/03/18  1602 03/03/18  1412 03/03/18  1204 03/03/18  1020   INR 1.15* 1.20* 1.35* 1.66*   PTT 29 31 30 30     Lactate  Invalid input(s): LACTATE    5. RADIOLOGY:   Recent Results (from the past 24 hour(s))   XR Chest Port 1 View    Narrative    XR CHEST PORT 1 VW  3/3/2018 2:18 AM    History: Endotracheal tube placement    Comparison: Prior day    Findings:   Single portable AP view of the chest is obtained. Endotracheal tube is  in stable position 5.6 cm above the gee. Right  internal jugular  central venous catheter tip projects over the mid SVC. Left internal  jugular central venous catheter tip projects over the superior  cavoatrial junction. There is a right inferior approach ECMO cannula  with the tip projecting near the superior cavoatrial junction.  Postsurgical changes of a subclavian artery cutdown for ECMO access  projecting over the right shoulder/axilla. Gastric tube tip and  sidehole projecting over left upper quadrant. Feeding type tube tip  not included in the image. Biapical pleural chest tubes.    The cardiomediastinal silhouette is not enlarged. Diffuse interstitial  fibrotic opacities are mildly increased in the left and stable on the  right. Increase in mild bibasilar opacities. No suspected pleural  effusion. No pneumothorax. Right supraclavicular subcutaneous  emphysema stable.      Impression    IMPRESSION:  1. Stable lines, tubes, and post surgical changes.  2. Mild increase in left perihilar interstitial opacities and stable  right perihilar opacities, pulmonary edema, infection.  3. Slight increase in mild bibasilar opacities which could represent  atelectasis or airspace consolidation.      I have personally reviewed the examination and initial interpretation  and I agree with the findings.    CHAUNCEY HUNTER MD ([place])   XR Chest Port 1 View    Narrative    Exam: XR CHEST PORT 1 VW, 3/3/2018 11:40 AM    Indication: swan in OR;     Comparison: 3/3/2018    Findings:   Single AP view the chest. ET tube tip 5.3 cm from the gee, right IJ  CVC with tip at the low SVC. Bilateral basilar and apical chest tubes  and mediastinal drains. NG tube projects over the stomach. Feeding  tube tip is out of the field-of-view. Advancement of [place]-Supriya  catheter tip projecting over the main pulmonary artery. Midline  sternotomy wires and surgical clips projecting over the right axilla.    Cardiac silhouette is stable. Patchy perihilar opacities, similar to  prior. No pneumothorax  or significant pleural effusion visualized.  Right costophrenic angle is collimated outside the field-of-view. Soft  tissue emphysema over the right supraclavicular soft tissues, similar  to prior.      Impression    Impression: [place]-Supriya catheter with tip projecting over the proximal  right main pulmonary artery.    I have personally reviewed the examination and initial interpretation  and I agree with the findings.    CHAUNCEY (Luly HUNTER MD       =========================================

## 2018-03-04 NOTE — PROGRESS NOTES
CVICU attending note:    Kecia Blue is a 57 year old female with PMH significant for dermatomyositis on immunosuppression, RA, ILD, and pulmonary hypertension, who was admitted for emergent lung transplant work-up on 2/21 for increasing shortness of breath and hypoxia now POD # 1 s/p emergent VA ECMO placement on 2/27/18. S/p bilateral lung transplant on 3/1.      Active problems and current treatments include:      1. Acute on chronic respiratory failure complicated with severe pulmonary HTN, acute right heart decompensation requiring bedside VA-ECMO placement on  2/27. Now s/p emergent bilateral lung transplant on 3/1. Plan: Plan to wean VA- ECMO in OR under OMA guidance today.      2. Mechanical ventilation s/p bilateral lung transplant in patient with ILD, severe pulmonary HTN. Hypoxemia. Plan: Continue protective mechanical ventilation. Wean PEEP to 8. Continue Flolan.      3. Cardiogenic shock with low CI and high SVR after ECMO decannulation. PA catheter placed in OR. On Epi and Nicardipine. Plan:  Adjust Epi and afterload reducing infusions to CI goal>2, SvO2 50, SVR around 1000. Monitor Lactic acid.      4. Anemia, coagluopathy and thrombocytopenia while anticoagulated on ECMO. Plan: Monitor CBC. Hgb goal >8. 2 units FFP, 1 PLT.     5. Non-oliguric acute kidney injury with hyperchloremia. Plan: Monitor UOP and lytes.      6. Malnutrition. Has TF. Plan: NPO.  No TF currently in setting of cardiogenic shock.     I personally managed the ventilator, sedation, analgesia, metabolic abnormalities and nutritional status.       The history and the 10 points review of systems are included in the note below.      I agree with the resident assessment and plan. I spent 60 minutes of critical care time exclusive of the procedures time, evaluating and managing this patient, discussing with the consultants and the patient's family.    Nakita Aguilar MD  Anesthesiology Critical Care Medicine  Pg. [phone]

## 2018-03-04 NOTE — PLAN OF CARE
Problem: Patient Care Overview  Goal: Plan of Care/Patient Progress Review  Patient remains on epinephrine and nicardipine, as well  as tacro, sedation, and insulin infusions. Inhaled flolan with [plan to slowly wean during the day 3/4/18, continued on current ventilator settings. CCO monitoring with ScVO2 60-68, CCI 2.0-2.8, CCO 3.5-4.5, ROSA CI 2.4-2.8. Scant secretions, lung sounds less coarse thorough night, RR 16-22, SPO2 %. Chest tube output decreased 330 total so far this shift. Urine output increased currently 25-45cc/hr, noaln. CVTS team updated with lab results from donor lung culture, isolation ordered and to be clarified if necessary.  bedside and updated. Patient will continue to be monitored and assessed. Please see MAR, flow sheets, and remainder of chart for further details. .

## 2018-03-04 NOTE — PHARMACY-VANCOMYCIN DOSING SERVICE
Pharmacy Vancomycin Note  Date of Service 2018  Patient's  1960   57 year old, female    Indication:Possible Postoperative Infection MRSA from donor lung fluid culture (3/1)  Goal Trough Level: 15-20 mg/L  Day of Therapy: 3  Current Vancomycin regimen:  Intermittent based on levels     Current estimated CrCl = Estimated Creatinine Clearance: 27.2 mL/min (based on Cr of 2.14).    Creatinine for last 3 days  3/1/2018:  9:26 AM Creatinine 1.30 mg/dL;  8:17 PM Creatinine 0.95 mg/dL  3/2/2018:  3:30 AM Creatinine 1.23 mg/dL; 10:21 AM Creatinine 1.42 mg/dL;  2:50 PM Creatinine 1.43 mg/dL;  9:42 PM Creatinine 1.68 mg/dL  3/3/2018:  3:29 AM Creatinine 1.80 mg/dL; 12:04 PM Creatinine 1.83 mg/dL;  2:48 PM Creatinine 1.95 mg/dL;  4:02 PM Creatinine 1.95 mg/dL;  7:51 PM Creatinine 2.11 mg/dL; 11:49 PM Creatinine 2.12 mg/dL  3/4/2018:  3:53 AM Creatinine 2.14 mg/dL    Recent Vancomycin Levels (past 3 days)  3/2/2018: 12:55 PM Vancomycin Level 12.2 mg/L  3/3/2018:  2:48 PM Vancomycin Level 20.0 mg/L  3/4/2018:  3:53 AM Vancomycin Level 17.6 mg/L    Vancomycin IV Administrations (past 72 hours)                   vancomycin (VANCOCIN) 1000 mg in dextrose 5% 200 mL PREMIX (mg) 1,000 mg New Bag 18 1522                Nephrotoxins and other renal medications (Future)    Start     Dose/Rate Route Frequency Ordered Stop    18 0900  vancomycin (VANCOCIN) 750 mg in sodium chloride 0.9 % 250 mL intermittent infusion      750 mg  over 90 Minutes Intravenous ONCE 18 0759      18 1800  tacrolimus (PROGRAF) 5,000 mcg in D5W 250 mL      15 mcg/hr  0.8 mL/hr  Intravenous CONTINUOUS 18 1748      18 1002  vancomycin place jordan - receiving intermittent dosing      1 each Does not apply SEE ADMIN INSTRUCTIONS 18 1003      03/03/18 1000  piperacillin-tazobactam (ZOSYN) 2.25 g vial to attach to  ml bag      2.25 g  over 30 Minutes Intravenous EVERY 6 HOURS 18 0758 18 9018              Contrast Orders - past 72 hours     None          Interpretation of levels and current regimen:  ~36 hour level is  Therapeutic    Has serum creatinine changed > 50% in last 72 hours: Yes    Urine output:  diminished urine output    Renal Function: Worsening    Plan:  1.  Re-dose with 750 mg IV once  2.  Pharmacy will check trough levels as appropriate in 1-3 Days.    3. Serum creatinine levels will be ordered daily for the first week of therapy and at least twice weekly for subsequent weeks.      Annita Chin, PharmD  Pager [phone]        .

## 2018-03-04 NOTE — PROGRESS NOTES
CVICU PROGRESS NOTE  March 4, 2018      ASSESSMENT: Kecia Blue is a 57 year old female with PMH significant for dermatomyositis on immunosuppression, eronegative RA, ILD, and pulmonary hypertension, who was admitted for emergent lung transplant work-up on 2/21 for increasing shortness of breath and hypoxia now s/p emergent VA ECMO placement on 2/27/18. Now s/p bilateral lung transplant on 3/1 and VA ECMO decannulation on 3/3 with Dr. Hernandez. Working on weaning pressor and ventilator requirements.    TODAY'S PROGRESS/PLAN  - Flolan wean very slowly (2ppm q2-4h) as able  - Sildenafil once dose, stopped due to decreased SVR  - AXR for feeding tube position   - Start slow subQ Heparin dosing BID  - Pan culture   - Zosyn dosing  - Consider stress dose steroids if needed for low SVR     PLAN:  Neuro/ pain/ sedation:  - Monitor neurological status. Notify the MD for any acute changes in exam.  - Fentanyl gtt for pain.  - Propofol gtt for sedation. (D/c midazolam 3/2)    Pulmonary care:  # Acute on chronic respiratory failure, worsening, s/p bilateral lung transplantation 3/1/2018  # ILD related to dermatomyositis, on transplant list  # Severe pulmonary hypertension (WHO class III)  # Pneumomediastinum, 2/22/2018  # VA ECMO decannulated 3/3   - Supplemental oxygen via ventilator to keep saturation above 92 %.  - Flolan 20mg/kg/min, wean as able, monitor CVPs  - Nitric oxide weaned off 3/1  - Not giving milrinone at this time due to SVT    Cardiovascular:  # Right heart failure  # Right ventricular hypertrophy  # Pulmonary HTN    - Monitor hemodynamic status.   - Most likely 2/2 pulmonary hypertension from ILD, but did not rule out PE - unlikely given hypotension occurred after intubation and did not occur at the same time as her worsening hypoxia.     GI care:   - NPO except ice chips and medications.  - NJ placed  - Trickle feeds restarted    Fluids/ Electrolytes/ Nutrition:   - Periodic fluid bolus for IV  fluid hydration  - Parenteral nutrition to start with placement of NG/NJ tube.    Renal/ Fluid Balance:  #Acute kidney injury  - Urine output is adequate so far.  - Cr now stable up post-op, will continue to monitor, q2h BMP  - Will continue to monitor intake and output.    Endocrine:    - Insulin gtt     ID/ Antibiotics:  #Febrile  - Anti-rejection and immunosuppressant medications per transplant team  - Donor lungs with MRSA, coverage for 2 weeks with vanco/zosyn continued with increased fever  - Unasyn added for culture stopped after one dose to continue broad culture  - Pan culture 3/4 with increased fever    Heme:     - Hemoglobin stable, trending down, Chest tube output has decreased will transfuse for Hgb less than 7.   - q2h Hgb     Prophylaxis:    - Mechanical prophylaxis for DVT.   - Heparin SubQ BID watching for acute signs of bleeding  - PPI    Lines/ tubes/ drains:  - LIJ MAC/swan, right brachial artery, femoral arterial line, PIV, ETT, Basilio, Chest tube    Disposition:  - CVICU.     D/w CVICU attending, Dr. Aguilar.    Kwesi Schwatrz MD  Anesthesiology Resident CA2, PGY3      ====================================    TODAY'S PROGRESS:   SUBJECTIVE:   - Lung transplant overnight, received 5 PRBCs, 3 FFP, one platelet on 3/3  - High chest tube output over 24hrs now decreasing    OBJECTIVE:   1. VITAL SIGNS:   Temp:  [96.1  F (35.6  C)-100.6  F (38.1  C)] 100.6  F (38.1  C)  Heart Rate:  [] 112  Resp:  [10-22] 17  MAP:  [57 mmHg-97 mmHg] 76 mmHg  Arterial Line BP: ()/(49-82) 113/65  FiO2 (%):  [40 %-80 %] 45 %  SpO2:  [91 %-100 %] 98 %  Ventilation Mode: CMV/AC  (Continuous Mandatory Ventilation/ Assist Control)  FiO2 (%): 45 %  Rate Set (breaths/minute): 16 breaths/min  Tidal Volume Set (mL): 400 mL  PEEP (cm H2O): 8 cmH2O  Oxygen Concentration (%): 50 %  Resp: 17    2. INTAKE/ OUTPUT:   I/O last 3 completed shifts:  In: 6657.14 [I.V.:2800.14; NG/GT:620]  Out: 2790 [Urine:400; Chest  Tube:2390]    3. PHYSICAL EXAMINATION:   General: Sedated, NAD  Neuro: Sedated, moving mouth with oral cares, NAD. Moving all four limbs with weaned sedation  Resp: Intubated, increasing lung sounds bilaterally  CV: RRR, No noted m/r/g  Abdomen: Soft, Non-distended  Incisions: c/d/i  Extremities: warm and well perfused    4. INVESTIGATIONS:   Arterial Blood Gases     Recent Labs  Lab 03/04/18 0353 03/03/18 2349 03/03/18 1951 03/03/18  1537   PH 7.45 7.44 7.47* 7.46*   PCO2 30* 30* 31* 30*   PO2 140* 199* 189* 205*   HCO3 21 20* 23 21     Complete Blood Count     Recent Labs  Lab 03/04/18 0353 03/03/18 2349 03/03/18 1951 03/03/18  1602   WBC 17.0* 18.1* 17.6* 19.4*   HGB 9.2* 9.4* 9.6* 9.6*   * 146* 146* 137*     Basic Metabolic Panel    Recent Labs  Lab 03/04/18 0353 03/03/18 2349 03/03/18 1951 03/03/18  1602   * 146* 150* 148*   POTASSIUM 4.2 4.3 4.1 4.2   CHLORIDE 117* 114* 116* 116*   CO2 20 20 22 24   BUN 69* 67* 63* 60*   CR 2.14* 2.12* 2.11* 1.95*   * 158* 105* 134*     Liver Function Tests    Recent Labs  Lab 03/04/18 0353 03/03/18 2349 03/03/18 1951 03/03/18  1602  03/03/18  0329  03/02/18  0330  03/01/18 2017 03/01/18  0356   AST  --   --   --   --   --  34  --   --   --  38  --  58*   ALT 40  --   --   --   --  29  --  38  --  65*  --  172*   ALKPHOS  --   --   --   --   --  27*  --   --   --  35*  --  37*   BILITOTAL  --   --   --   --   --  1.5*  --   --   --  1.2  --  0.7   ALBUMIN 2.5*  --   --   --   --  2.4*  --  2.9*  --  1.7*  --  2.4*   INR 1.12 1.15* 1.13 1.15*  < > 1.36*  < > 1.51*  < > 1.86*  < > 1.51*   < > = values in this interval not displayed.  Pancreatic Enzymes  No lab results found in last 7 days.  Coagulation Profile    Recent Labs  Lab 03/04/18  0353 03/03/18  2349 03/03/18  1951 03/03/18  1602   INR 1.12 1.15* 1.13 1.15*   PTT 28 27 28 29     Lactate  Invalid input(s): LACTATE    5. RADIOLOGY:   Recent Results (from the past 24 hour(s))   XR Chest  Port 1 View    Narrative    Exam: XR CHEST PORT 1 VW, 3/3/2018 11:40 AM    Indication: swan in OR;     Comparison: 3/3/2018    Findings:   Single AP view the chest. ET tube tip 5.3 cm from the gee, right IJ  CVC with tip at the low SVC. Bilateral basilar and apical chest tubes  and mediastinal drains. NG tube projects over the stomach. Feeding  tube tip is out of the field-of-view. Advancement of [place]-Supriya  catheter tip projecting over the main pulmonary artery. Midline  sternotomy wires and surgical clips projecting over the right axilla.    Cardiac silhouette is stable. Patchy perihilar opacities, similar to  prior. No pneumothorax or significant pleural effusion visualized.  Right costophrenic angle is collimated outside the field-of-view. Soft  tissue emphysema over the right supraclavicular soft tissues, similar  to prior.      Impression    Impression: [place]-Supriya catheter with tip projecting over the proximal  right main pulmonary artery.    I have personally reviewed the examination and initial interpretation  and I agree with the findings.    CHAUNCEY HUNTER MD ([place])       =========================================

## 2018-03-04 NOTE — PROGRESS NOTES
CVTS PROGRESS NOTE  March 4, 2018      ASSESSMENT: Kecia Blue is a 57 year old female with PMH significant for dermatomyositis on immunosuppression, eronegative RA, ILD, and pulmonary hypertension, who was admitted for emergent lung transplant work-up on 2/21 for increasing shortness of breath and hypoxia now s/p emergent [facility] ECMO placement on 2/27/18. Now s/p bilateral lung transplant on 3/1 and [facility] ECMO decannulation on 3/3 with Dr. Hernandez. Working on weaning pressor and ventilator requirements.    TODAY'S PROGRESS/PLAN  - Flolan wean very slowly (2ppm q2-4h) as able  - Sildenafil once dose, stopped due to decreased SVR  - AXR for feeding tube position   - Start slow subQ Heparin dosing BID  - Pan culture   - Zosyn dosing  - Consider stress dose steroids if needed for low SVR     PLAN:  Neuro/ pain/ sedation:  - Monitor neurological status. Notify the MD for any acute changes in exam.  - Fentanyl gtt for pain.  - Propofol gtt for sedation. (D/c midazolam 3/2)    Pulmonary care:  # Acute on chronic respiratory failure, worsening, s/p bilateral lung transplantation 3/1/2018  # ILD related to dermatomyositis, on transplant list  # Severe pulmonary hypertension (WHO class III)  # Pneumomediastinum, 2/22/2018  # [facility] ECMO decannulated 3/3   - Supplemental oxygen via ventilator to keep saturation above 92 %.  - Flolan 20mg/kg/min, wean as able, monitor CVPs  - Nitric oxide weaned off 3/1  - Not giving milrinone at this time due to SVT    Cardiovascular:  # Right heart failure  # Right ventricular hypertrophy  # Pulmonary HTN    - Monitor hemodynamic status.   - Most likely 2/2 pulmonary hypertension from ILD, but did not rule out PE - unlikely given hypotension occurred after intubation and did not occur at the same time as her worsening hypoxia.     GI care:   - NPO except ice chips and medications.  - NJ placed  - Trickle feeds restarted    Fluids/ Electrolytes/ Nutrition:   - Periodic fluid bolus for IV  fluid hydration  - Parenteral nutrition to start with placement of NG/NJ tube.    Renal/ Fluid Balance:  #Acute kidney injury  - Urine output is adequate so far.  - Cr now stable up post-op, will continue to monitor, q2h BMP  - Will continue to monitor intake and output.    Endocrine:    - Insulin gtt     ID/ Antibiotics:  #Febrile  - Anti-rejection and immunosuppressant medications per transplant team  - Donor lungs with MRSA, coverage for 2 weeks with vanco/zosyn continued with increased fever  - Unasyn added for culture stopped after one dose to continue broad culture  - Pan culture 3/4 with increased fever    Heme:     - Hemoglobin stable, trending down, Chest tube output has decreased will transfuse for Hgb less than 7.   - q2h Hgb     Prophylaxis:    - Mechanical prophylaxis for DVT.   - Heparin SubQ BID watching for acute signs of bleeding  - PPI    Lines/ tubes/ drains:  - LIJ MAC/swan, right brachial artery, femoral arterial line, PIV, ETT, Basilio, Chest tube    Disposition:  - [facility]ICU.       Kwesi Schwartz MD  Anesthesiology Resident CA2, PGY3      ====================================    TODAY'S PROGRESS:   SUBJECTIVE:   - Lung transplant overnight, received 5 PRBCs, 3 FFP, one platelet on 3/3  - High chest tube output over 24hrs now decreasing    OBJECTIVE:   1. VITAL SIGNS:   Temp:  [98.8  F (37.1  C)-101.5  F (38.6  C)] 101.5  F (38.6  C)  Heart Rate:  [100-166] 117  Resp:  [16-22] 18  MAP:  [54 mmHg-97 mmHg] 77 mmHg  Arterial Line BP: ()/(45-82) 108/63  FiO2 (%):  [40 %-60 %] 45 %  SpO2:  [91 %-100 %] 99 %  Ventilation Mode: CMV/AC  (Continuous Mandatory Ventilation/ Assist Control)  FiO2 (%): 45 %  Rate Set (breaths/minute): 16 breaths/min  Tidal Volume Set (mL): 400 mL  PEEP (cm H2O): 8 cmH2O  Oxygen Concentration (%): 50 %  Resp: 18    2. INTAKE/ OUTPUT:   I/O last 3 completed shifts:  In: 4612.96 [I.V.:1980.96; NG/GT:555]  Out: 2020 [Urine:530; Emesis/NG output:100; Chest Tube:1390]    3.  PHYSICAL EXAMINATION:   General: Sedated, NAD  Neuro: Sedated, moving mouth with oral cares, NAD. Moving all four limbs with weaned sedation  Resp: Intubated, increasing lung sounds bilaterally  CV: RRR, No noted m/r/g  Abdomen: Soft, Non-distended  Incisions: c/d/i  Extremities: warm and well perfused    4. INVESTIGATIONS:   Arterial Blood Gases     Recent Labs  Lab 03/04/18  1348 03/04/18  1205 03/04/18  1017 03/04/18  0815   PH 7.44 7.45 7.43 7.45   PCO2 30* 29* 31* 30*   PO2 151* 147* 93 138*   HCO3 20* 20* 20* 21     Complete Blood Count     Recent Labs  Lab 03/04/18  1205 03/04/18  0815 03/04/18  0353 03/03/18  2349   WBC 18.1* 18.2* 17.0* 18.1*   HGB 8.9* 9.4* 9.2* 9.4*   * 155 147* 146*     Basic Metabolic Panel    Recent Labs  Lab 03/04/18  1205 03/04/18  0815 03/04/18  0353 03/03/18  2349   * 149* 150* 146*   POTASSIUM 4.2 4.3 4.2 4.3   CHLORIDE 118* 117* 117* 114*   CO2 20 20 20 20   BUN 67* 68* 69* 67*   CR 2.06* 2.09* 2.14* 2.12*   * 141* 133* 158*     Liver Function Tests    Recent Labs  Lab 03/04/18  1205 03/04/18  0815 03/04/18  0353 03/03/18  2349  03/03/18  0329  03/02/18  0330  03/01/18 2017 03/01/18  0356   AST  --   --   --   --   --  34  --   --   --  38  --  58*   ALT  --   --  40  --   --  29  --  38  --  65*  --  172*   ALKPHOS  --   --   --   --   --  27*  --   --   --  35*  --  37*   BILITOTAL  --   --   --   --   --  1.5*  --   --   --  1.2  --  0.7   ALBUMIN  --   --  2.5*  --   --  2.4*  --  2.9*  --  1.7*  --  2.4*   INR 1.19* 1.16* 1.12 1.15*  < > 1.36*  < > 1.51*  < > 1.86*  < > 1.51*   < > = values in this interval not displayed.  Pancreatic Enzymes  No lab results found in last 7 days.  Coagulation Profile    Recent Labs  Lab 03/04/18  1205 03/04/18  0815 03/04/18  0353 03/03/18  2349   INR 1.19* 1.16* 1.12 1.15*   PTT 27 26 28 27     Lactate  Invalid input(s): LACTATE    5. RADIOLOGY:   Recent Results (from the past 24 hour(s))   XR Chest Port 1 View     Narrative    XR CHEST PORT 1 VW  3/4/2018 3:43 AM    History:  Post-Op Lung Transplant; Organ transplant candidate; Lung  disease, interstitial (H); Abnormal chest CT; Hypotension, unspecified  hypotension type; Acute on chronic respiratory failure with hypoxia  (H); ILD (interstitial lung disease) (H).     Comparison: Chest radiograph dated 3/3/2018    Findings:   Supine portable AP chest radiograph. Endotracheal tube with the tip  projects 5.1 cm above gee. Left IJ approach Ovalo-Supriya catheter with  the tip projects over main pulmonary artery. Stable position of  bilateral apical and bibasilar chest tubes. Stable position of the  right IJ catheter with the tip projecting over the low SVC. NG/OG tube  with the tip and sidehole projecting over the stomach. Enteric tube  with its tip collimated out of the image. Median sternotomy wires.  Surgical clips projecting over the right axilla/lateral chest wall.  Mediastinal surgical clips.    Cardiomediastinal silhouette is within normal limits. Pulmonary  vasculature is mildly indistinct. No significant change in left  perihilar opacities. No pneumothorax or pleural effusion.      Impression    IMPRESSION:  1.  Stable support lines and tubes.  2.  No significant change in left perihilar opacities, which may  represent pulmonary edema versus infection.    I have personally reviewed the examination and initial interpretation  and I agree with the findings.    BENJAMÍN SANTAMARIA MD   XR Abdomen Port 1 View    Narrative    Exam: Abdominal x-ray, 1 view, 3/4/2018.    COMPARISON: 3/2/2018.    HISTORY: Feeding tube placement.    FINDINGS: Supine view of the abdomen was obtained. Feeding tube with  its tip projecting over the third/fourth portion of the duodenum.  NG/OG tube with its tip and sidehole projecting over the stomach.  Median sternotomy wires. Partially visualized chest support devices.  Paucity of bowel gas throughout the abdomen. No pneumatosis. No portal  venous  gas. Evaluation of free air is limited in the supine position.  Pelvic phleboliths.      Impression    IMPRESSION: Feeding tube with its tip projecting over the third/fourth  portion of the duodenum.    BENJAMÍN SANTAMARIA MD       =========================================

## 2018-03-04 NOTE — PLAN OF CARE
Problem: Patient Care Overview  Goal: Plan of Care/Patient Progress Review  Patient left for OR at about 0830 this am. Returned at 1205. De-cannulated in OR and PA cath placed. Goals to keep SVR ~ 1000, SvO2 >=60, MAP >65. Using Nicardipine and Epi to achieve. Serial labs ran q2 h after OR, 2 units FFP,1 platelet and 250 cc albumin given. Minimal output from chest tubes and dressings CDI. Pt did have a 2 minute run of SVT at 1838, Epi decreased and Propofol increased. FiO2 weaned as tolerated, no at 50%, Peep to stay at 8 overnight. Pa 40/10s, SvO2 high 50s to high 60s CVP 12 then 16, 15. SVR 7328-5582. UOP continues to be minimal,  MD aware. Plan to wean flolan tomorrow. With sedation vacation pt did open eyes and turn head back and forth but did not follow commands, restraints placed for pt safety.

## 2018-03-04 NOTE — PROGRESS NOTES
Pulmonary Medicine  Cystic Fibrosis - Lung Transplant Daily Progress Note   March 4, 2018       Patient: Kecia Blue  MRN: [id number]  Transplant Date: 2/21/2018 (POD# 3)  Admission date: 2/21/2018  Hospital Day #11          Assessment and Plan:     Kecia Blue is a 56 y/o woman with pmh sig for Dermatomyositis, seronegative RA, ILD, Pulm HTN,  S/p V-A ECMO for R heart failure on 2/27/18 now s/p BLST on 3/1/18 (POD#3)    TRANSPLANT:  Surgeon: Jacinto Garcia. Donor ID [id number], Donor/Recipient serologies below.   [facility] - 3/3/2018    Explant pathology: pending    PROBLEM LIST:  1. S/p BSLT   2. Right Heart Failure  3. Cardiogenic Shock  4. Acute Kidney Injury  5. Acute Respiratory Failure s/p Oral ETT-Mechanical Ventilation  6. Volume overload with intravascular depletion  7. Electrolyte abnormalities - hypernatremia, hyperkalemia  8. Mild-moderate Protein Calorie Malnutrition  9. Cultures (+) MRSA and Moraxella catarrhalis     PULMONARY  1) S/P lung transplant    - awaiting explant pathology  - continue to wean vent as tolerated  - continue with aggressive pulmonary toilet  - diagnostic and therapeutic bronch prior to extubation     Continue 3-Drug Immunosuppression:  Tacrolimus: current rate at 30 mcg/hr with TAC goal  9 - 11. Will continue to check TAC level on daily basis and dose adjust accordingly.         Date Tacro Goal  Tacro Level Tacro  Dose   3/2/18 10 - 15 19.4     3/3/18 Changed 9 - 11 15.6 Rate decreased to 15 mcg/hr                Mycophenolate 1500 mg bid PGT bid (wbc = 18.2)  Prednisone: 12.9 mg bid per Tube to begin today on 03/03/2018     Continue Transplant Prophylaxis:   Patient/Donor Serologies:        Donor Recipient   CMV status  Pos   Pos    EBV status  Neg Pos   HSV status Neg  Pos      PJP: Bactrim 400-80 mg on tab daily  CMV: Valgancyclovir to begin on POD#8 per protocol  Fungus: Nystatin: 1,000,000 units QID     COR:  Right Heart Failure on  [facility]  Plan per ECMO Team  - OMA and attempt de-cannulation today in OR  - attempt placement of Right Heart Catheter  - continue to monitor strict I+O's and daily weights     ID  Severe SIRS/Sepsis with worsening leukocytosis, hemodynamic lability, end-organ dysfunction, Cultures notable for (+) Moraxella, MRStaph aureus, currently on Zosyn 2.25 g q6 hrs and Vancomycin intermittently dosed for patient's renal failure  - Will continue current antibiotic regimen for total of 14 days (will put stop date in place)  - will f/u microbiology data and change antibiotics accordingly  - pan-culture if spikes  - will continue to monitor for sign and symptoms of infection and adequate tissue perfusion     RENAL  Nonoliguric acute kidney injury: no indication for hemodialysis at this time, Cr 2.09  - continue to trend Bun/Cr  - replete lytes on a prn basis  - will dose adjust patient medications according to their creatinine clearance  - will avoid nephrotoxic agents.  - We will monitor for medication interactions and immunosuppression levels in conjunction with Pharmacy  - will increase free water for hypernatremia  - Will discuss with Pharmacy if medication carriers are in NaCL or Glucose (favor latter) given hyperchloremia     GI:  Protein-calorie malnutrition, currently on trophic tube feeds. Greatly appreciate Clinical Nutritional Services input.  - will advance tube feeds a tolerated     Disposition: Patient remains critically ill, to remain in ICU until medially stable          Subjective & Interval History:     Last 24 hours of care team notes reviewed.  Successful ECMO decannulation overnight  Patient remains on epinephrine and nicardipine, as well  as tacro, sedation, and insulin infusions. Inhaled flolan with [plan to slowly wean during the day 3/4/18, continued on current ventilator settings. CCO monitoring with ScVO2 60-68, CCI 2.0-2.8, CCO 3.5-4.5, ROSA CI 2.4-2.8. Scant secretions, lung sounds less coarse  thorough night, RR 16-22, SPO2 %. Chest tube output decreased 330 total so far this shift. Urine output increased currently 25-45cc/hr, nolan. CVTS team updated with lab results from donor lung culture, isolation ordered and to be clarified if necessary.  bedside and updated. Patient will continue to be monitored and assessed. Please see MAR, flow sheets, and remainder of chart for further details.           Review of Systems:     Unable to obtain medical details or updated ROS due to barriers to communication including AMS, sedation, oral ett, and critcal illness.           Medications:     Active Medications:    [START ON 3/6/2018] sulfamethoxazole-trimethoprim  10 mL Oral or NG Tube Once per day on Tue Thu Sat    Or     [START ON 3/6/2018] sulfamethoxazole-trimethoprim  1 tablet Oral or NG Tube Once per day on Tue Thu Sat     vancomycin (VANCOCIN) IV  750 mg Intravenous Once     sildenafil  20 mg Oral or Feeding Tube Q8H     heparin  5,000 Units Subcutaneous Q12H     polyethylene glycol  17 g Oral or Feeding Tube Daily     sennosides  5 mL Oral or Feeding Tube At Bedtime     ampicillin-sulbactam (UNASYN) IV  1.5 g Intravenous Q12H     vancomycin place jordan - receiving intermittent dosing  1 each Does not apply See Admin Instructions     albuterol  6 puff Inhalation Q4H     multivitamins with minerals  15 mL Per Feeding Tube Daily     protein modular  1 packet Per Feeding Tube BID     nystatin  1,000,000 Units Swish & Swallow 4x Daily     pantoprazole (PROTONIX) IV  40 mg Intravenous Daily     mycophenolate  1,500 mg Oral BID IS    Or     mycophenolate  1,500 mg Oral or NG Tube BID IS     prednisoLONE  0.25 mg/kg (Dosing Weight) Oral or NG Tube BID     acetaminophen  975 mg Oral or Feeding Tube Q8H     docusate  100 mg Oral or Feeding Tube BID     artificial tears   Both Eyes Q6H     Active PRN Medications:  IV fluid REPLACEMENT ONLY, Reason beta blocker order not selected, naloxone, acetaminophen,  oxyCODONE IR, ondansetron **OR** ondansetron, IV fluid REPLACEMENT ONLY, glucose **OR** dextrose **OR** glucagon, heparin, propofol (DIPRIVAN) infusion **AND** propofol **AND** CK total **AND** Triglycerides, potassium chloride, potassium chloride, potassium chloride, potassium chloride with lidocaine, potassium chloride, magnesium sulfate, magnesium sulfate         Physical Exam:     PHYSICAL EXAMINATION:   GEN: sedated. Open eyes and localizes to voice in no acute distress, lying in bed at 30 degrees  HEENT: Pupils equal and reactive to light, conjunctiva are pink conjunctivae, sclera anicteric, not able to fully inspect oropharynx secondary to oral endotracheal tube: MMM  PULM: Good air flow bilaterally, symmetric in expansion, Scattered crackles to lower lobes. No rhonchi, no wheezes. Breathing non-labored. Chest wounds clean/dry/intact without erythema/flocculence   NECK: supple, no JVD/LAD  CV: Normal S1 and S2. RRR.  No murmur, gallop, or rub.  No peripheral edema.  Peripheral pulses intact.   ABD: Soft, nontender, nondistended. Bowel sound hypoactive no guarding, no rebound.   MSK: .  No apparent muscle wasting. No clubbing, cyanosis, or edema  NEURO: unable to assess orientation. Not following commands. Moves all fours spontaneously,   SKIN: Warm and dry. No visible rashes or lesions   PSYCH: Mood stable, judgment and insight appropriate.  ?     Lines, Drains, and Devices:  Peripheral IV 02/21/18 Left;Lateral Upper forearm (Active)   Site Assessment WDL 3/4/2018  4:00 AM   Line Status Infusing 3/4/2018  4:00 AM   Phlebitis Scale 0-->no symptoms 3/4/2018  4:00 AM   Infiltration Scale 0 3/4/2018  4:00 AM   Infiltration Site Treatment Method  None 3/4/2018  4:00 AM   Extravasation? No 3/4/2018  4:00 AM   Dressing Intervention New dressing  3/3/2018  4:00 AM   IV Site Rotation Due Date 02/25/18 3/2/2018  4:00 AM   Reason Not Rotated Poor venous access 3/2/2018  4:00 AM   Number of days:11       Arterial Line  02/27/18 Femoral (Active)   Site Assessment WDL 3/4/2018  4:00 AM   Line Status Pulsatile blood flow 3/4/2018  4:00 AM   Art Line Waveform Appropriate 3/4/2018  4:00 AM   Art Line Interventions Leveled;Connections checked and tightened 3/4/2018  4:00 AM   Color/Movement/Sensation Cool fingers/toes 3/4/2018  4:00 AM   Dressing Type Transparent 3/4/2018  4:00 AM   Dressing Status Clean, dry, intact 3/4/2018  4:00 AM   Dressing Intervention Dressing changed/new dressing 3/4/2018 12:00 AM   Dressing Change Due 03/11/18 3/4/2018 12:00 AM   Number of days:5       Arterial Line 03/03/18 Brachial (Active)   Site Assessment [facility] 3/4/2018  4:00 AM   Line Status Pulsatile blood flow 3/4/2018  4:00 AM   Art Line Waveform Dampened 3/4/2018  4:00 AM   Art Line Interventions Leveled 3/4/2018  4:00 AM   Color/Movement/Sensation Cool fingers/toes 3/4/2018  4:00 AM   Dressing Type Transparent 3/4/2018  4:00 AM   Dressing Status Clean, dry, intact 3/4/2018  4:00 AM   Dressing Intervention Dressing changed/new dressing 3/4/2018 12:00 AM   Dressing Change Due 03/11/18 3/4/2018 12:00 AM   Number of days:1       CVC Double Lumen 03/01/18 (Active)   Site Assessment [facility] 3/4/2018  4:00 AM   Extravasation? No 3/4/2018  4:00 AM   Dressing Intervention Chlorhexidine sponge 3/4/2018  4:00 AM   Dressing Change Due 03/11/18 3/4/2018 12:00 AM   CVC Comment PA cath present, 2 side port  3/3/2018  8:00 PM   CVC Lumen Assessment White;Brown;Blue 3/2/2018  4:00 AM   Number of days:3       CVC Triple Lumen 02/27/18 Right Internal jugular (Active)   Site Assessment WDL 3/4/2018  4:00 AM   Extravasation? No 3/4/2018  4:00 AM   Dressing Intervention Chlorhexidine sponge 3/4/2018  4:00 AM   Dressing Change Due 03/11/18 3/4/2018 12:00 AM   CVC Comment CDI 3/3/2018  8:00 PM   CVC Lumen Assessment White;Brown;Blue 3/2/2018  4:00 AM   Number of days:5       Pulmonary Artery Catheter - Single Lumen 03/03/18 1000 Left internal jugular vein continuous hemodynamic  "catheter (Active)   Dressing dressing dry and intact 3/4/2018  4:00 AM   Securement secured with sutures 3/4/2018  4:00 AM   PA Catheter Markings (cm) 51 3/4/2018  4:00 AM   Phlebitis 0-->no symptoms 3/4/2018  4:00 AM   Infiltration 0-->no symptoms 3/4/2018  4:00 AM   Waveform normal 3/4/2018  4:00 AM   Balloon Inflation Volume to Obtain Wedge Tracing (mL) 0 3/4/2018  4:00 AM   Pressure Catheter Interventions blood specimen obtained and sent to lab 3/4/2018 12:00 AM   Daily Review Of Necessity completed 3/4/2018  4:00 AM   Number of days:1            Data:     All vital signs, laboratory and imaging data for the past 24 hours reviewed.      Vital signs:  Temp: 101.3  F (38.5  C) Temp src: Pulmonary Artery     Heart Rate: 106 Resp: 20 SpO2: 100 % O2 Device: Mechanical Ventilator Oxygen Delivery:  (per perfusion/RT ECMO changed to 90 percent) Height: 160 cm (5' 3\") Weight: 58.1 kg (128 lb 1.4 oz)    Weight trend:   Vitals:    03/02/18 0400 03/03/18 0000 03/04/18 0000   Weight: 55.7 kg (122 lb 12.7 oz) 57.6 kg (126 lb 15.8 oz) 58.1 kg (128 lb 1.4 oz)        I/O:   Intake/Output Summary (Last 24 hours) at 03/04/18 1003  Last data filed at 03/04/18 0900   Gross per 24 hour   Intake          3818.69 ml   Output             1675 ml   Net          2143.69 ml       Labs    CMP:   Recent Labs  Lab 03/04/18  0815 03/04/18  0353 03/03/18  2349 03/03/18  1951 03/03/18  1602  03/03/18  0329  03/02/18  0330 03/01/18 2017 03/01/18  0356   * 150* 146* 150* 148*  < > 147*  < > 148* 150*  < > 143   POTASSIUM 4.3 4.2 4.3 4.1 4.2  < > 4.2  < > 3.8 3.4  < > 4.3   CHLORIDE 117* 117* 114* 116* 116*  < > 117*  < > 117* 119*  < > 110*   CO2 20 20 20 22 24  < > 20  < > 22 24  < > 25   ANIONGAP 12 13 12 11 9  < > 10  < > 9 7  < > 8   * 133* 158* 105* 134*  < > 168*  < > 156* 232*  < > 125*   BUN 68* 69* 67* 63* 60*  < > 53*  < > 37* 30  < > 37*   CR 2.09* 2.14* 2.12* 2.11* 1.95*  < > 1.80*  < > 1.23* 0.95  < > 1.25* "   GFRESTIMATED 25* 24* 24* 24* 27*  < > 29*  < > 45* 61  < > 44*   GFRESTBLACK 30* 29* 29* 29* 32*  < > 35*  < > 55* 74  < > 54*   CHELSEY 7.6* 7.8* 7.8* 7.8* 7.8*  < > 7.1*  < > 7.0* 6.8*  < > 7.8*   MAG  --  2.8*  --  2.7* 2.9*  --  2.1  < > 2.2 1.9  --  2.9*   PHOS  --  5.0*  --   --   --   --  4.4  --  4.0 3.9  --  3.9   PROTTOTAL  --   --   --   --   --   --  4.0*  --   --  3.7*  --  5.6*   ALBUMIN  --  2.5*  --   --   --   --  2.4*  --  2.9* 1.7*  --  2.4*   BILITOTAL  --   --   --   --   --   --  1.5*  --   --  1.2  --  0.7   ALKPHOS  --   --   --   --   --   --  27*  --   --  35*  --  37*   AST  --   --   --   --   --   --  34  --   --  38  --  58*   ALT  --  40  --   --   --   --  29  --  38 65*  --  172*   < > = values in this interval not displayed.  CBC:   Recent Labs  Lab 03/04/18  0815 03/04/18  0353 03/03/18  2349 03/03/18  1951   WBC 18.2* 17.0* 18.1* 17.6*   RBC 3.07* 3.04* 3.14* 3.15*   HGB 9.4* 9.2* 9.4* 9.6*   HCT 26.8* 26.9* 27.2* 27.4*   MCV 87 89 87 87   MCH 30.6 30.3 29.9 30.5   MCHC 35.1 34.2 34.6 35.0   RDW 16.1* 15.8* 15.6* 15.4*    147* 146* 146*     INR:   Recent Labs  Lab 03/04/18  0815 03/04/18  0353 03/03/18  2349 03/03/18 1951   INR 1.16* 1.12 1.15* 1.13     Glucose:   Recent Labs  Lab 03/04/18  0816 03/04/18  0815 03/04/18  0559 03/04/18  0353 03/04/18  0228 03/03/18  2353 03/03/18  2349 03/03/18  2202  03/03/18 1951 03/03/18  1602 03/03/18  1448   GLC  --  141*  --  133*  --   --  158*  --   --  105*  --  134* 154*   *  --  134* 134* 118* 149*  --  139*  < >  --   < >  --  156*   < > = values in this interval not displayed.  Blood Gas:   Recent Labs  Lab 03/04/18  0815 03/04/18  0559 03/04/18  0353 03/03/18  2349 03/03/18 1956 03/01/18 0357  02/28/18  1639   PHV  --  7.42 7.41 7.40 7.43  < >  --   --   --    PCO2V  --  34* 33* 36* 36*  < >  --   --   --    PO2V  --  34 31 34 33  < >  --   --   --    HCO3V  --  22 21 22 24  < >  --   --   --    LASHAUN  --   --   --   --   0.0  --  2.1  --  1.1   O2PER 45.0 45 40  40 50.0  50 50.0  < >  --   < >  --    < > = values in this interval not displayed.  Culture Data   Recent Labs  Lab 03/03/18  0314 03/02/18  0524 03/01/18  1627 03/01/18  1618 03/01/18  0527 03/01/18  0356 02/28/18  0631   CULT No growth after 1 day No growth after 2 days Culture received and in progress.  Positive AFB results are called as soon as detected.  Final report to follow in 7 to 8 weeks.  Assayed at [facility] [address] [place] [id number] [phone]  No growth  PENDING  Canceled, Test credited  Test reordered as correct code Light growthMethicillin resistant Staphylococcus aureus (MRSA)*  Critical Value/Significant Value called to and read back byJELANI HASSAN RN ([id number]).  03.03.18 2334 GJS  PENDING  Canceled, Test credited  Test reordered as correct code  PENDING Light growthNormal alo No growth after 3 days No growth after 4 days     Virology Data: Lab Results   Component Value Date    FLUAH1 Negative 02/27/2018    FLUAH3 Negative 02/27/2018    EZ7589 Negative 02/27/2018    IFLUB Negative 02/27/2018    RSVA Negative 02/27/2018    RSVB Negative 02/27/2018    PIV1 Negative 02/27/2018    PIV2 Negative 02/27/2018    PIV3 Negative 02/27/2018    HMPV Negative 02/27/2018    HRVS Negative 02/27/2018    ADVBE Negative 02/27/2018    ADVC Negative 02/27/2018     Historical CMV results (last 3 of prior testing):  Lab Results   Component Value Date    CMVQNT Canceled, Test credited (A) 03/01/2018    CMVQNT Canceled, Test credited (A) 03/01/2018     Lab Results   Component Value Date    CMVLOG Canceled, Test credited 03/01/2018    CMVLOG Canceled, Test credited 03/01/2018     Urine Studies  Recent Labs   Lab Test  03/01/18   0528   URINEPH  5.5   NITRITE  Negative   LEUKEST  Negative   WBCU  4       Patient was seen and examined by me. I personally reviewed the electronic medical record including labs, flowsheets, imaging reports and films,  vitals, and medications. I discussed the case in detail with the multidisciplinary SICU team. I provided a clinical update to the patient's . I answered all of his questions and provided him support.     I spent 40 minutes care time excluding teaching and procedures    I greatly appreciate the excellent care the SICU team is providing to our patient. Please do not hesitate to call the Pulmonary Firm if you have any questions or concerns.     Alex Hale MD, MACLEENA   of Medicine  Pulmonary, Critical Care and Sleep Medicine  [facility]  Pager: [phone]

## 2018-03-05 ENCOUNTER — APPOINTMENT (OUTPATIENT)
Dept: OCCUPATIONAL THERAPY | Facility: CLINIC | Age: 58
DRG: 003 | End: 2018-03-05
Attending: THORACIC SURGERY (CARDIOTHORACIC VASCULAR SURGERY)
Payer: COMMERCIAL

## 2018-03-05 ENCOUNTER — APPOINTMENT (OUTPATIENT)
Dept: GENERAL RADIOLOGY | Facility: CLINIC | Age: 58
DRG: 003 | End: 2018-03-05
Attending: SURGERY
Payer: COMMERCIAL

## 2018-03-05 ENCOUNTER — APPOINTMENT (OUTPATIENT)
Dept: GENERAL RADIOLOGY | Facility: CLINIC | Age: 58
DRG: 003 | End: 2018-03-05
Attending: THORACIC SURGERY (CARDIOTHORACIC VASCULAR SURGERY)
Payer: COMMERCIAL

## 2018-03-05 LAB
ALBUMIN SERPL-MCNC: 2.1 G/DL (ref 3.4–5)
ALT SERPL W P-5'-P-CCNC: 40 U/L (ref 0–50)
ANION GAP SERPL CALCULATED.3IONS-SCNC: 10 MMOL/L (ref 3–14)
ANION GAP SERPL CALCULATED.3IONS-SCNC: 10 MMOL/L (ref 3–14)
ANION GAP SERPL CALCULATED.3IONS-SCNC: 11 MMOL/L (ref 3–14)
ANION GAP SERPL CALCULATED.3IONS-SCNC: 12 MMOL/L (ref 3–14)
ANION GAP SERPL CALCULATED.3IONS-SCNC: 9 MMOL/L (ref 3–14)
APTT PPP: 24 SEC (ref 22–37)
APTT PPP: 25 SEC (ref 22–37)
APTT PPP: 26 SEC (ref 22–37)
APTT PPP: 26 SEC (ref 22–37)
APTT PPP: 27 SEC (ref 22–37)
AT III ACT/NOR PPP CHRO: 113 % (ref 85–135)
BASE DEFICIT BLDA-SCNC: 2 MMOL/L
BASE DEFICIT BLDA-SCNC: 2.4 MMOL/L
BASE DEFICIT BLDA-SCNC: 2.6 MMOL/L
BASE DEFICIT BLDA-SCNC: 2.9 MMOL/L
BASE DEFICIT BLDA-SCNC: 3.1 MMOL/L
BASE DEFICIT BLDA-SCNC: 3.5 MMOL/L
BASE DEFICIT BLDV-SCNC: 1.4 MMOL/L
BASE DEFICIT BLDV-SCNC: 1.7 MMOL/L
BASE DEFICIT BLDV-SCNC: 2.9 MMOL/L
BASE DEFICIT BLDV-SCNC: 3.7 MMOL/L
BASE DEFICIT BLDV-SCNC: 3.7 MMOL/L
BASE DEFICIT BLDV-SCNC: 4.7 MMOL/L
BASE DEFICIT BLDV-SCNC: 5.5 MMOL/L
BASOPHILS # BLD AUTO: 0 10E9/L (ref 0–0.2)
BASOPHILS # BLD AUTO: 0 10E9/L (ref 0–0.2)
BASOPHILS NFR BLD AUTO: 0 %
BASOPHILS NFR BLD AUTO: 0 %
BUN SERPL-MCNC: 69 MG/DL (ref 7–30)
BUN SERPL-MCNC: 70 MG/DL (ref 7–30)
BUN SERPL-MCNC: 70 MG/DL (ref 7–30)
BUN SERPL-MCNC: 73 MG/DL (ref 7–30)
BUN SERPL-MCNC: 74 MG/DL (ref 7–30)
CA-I BLD-MCNC: 4.2 MG/DL (ref 4.4–5.2)
CALCIUM SERPL-MCNC: 7.4 MG/DL (ref 8.5–10.1)
CALCIUM SERPL-MCNC: 7.4 MG/DL (ref 8.5–10.1)
CALCIUM SERPL-MCNC: 7.5 MG/DL (ref 8.5–10.1)
CALCIUM SERPL-MCNC: 7.7 MG/DL (ref 8.5–10.1)
CALCIUM SERPL-MCNC: 7.8 MG/DL (ref 8.5–10.1)
CHLORIDE SERPL-SCNC: 117 MMOL/L (ref 94–109)
CHLORIDE SERPL-SCNC: 117 MMOL/L (ref 94–109)
CHLORIDE SERPL-SCNC: 118 MMOL/L (ref 94–109)
CHLORIDE SERPL-SCNC: 119 MMOL/L (ref 94–109)
CHLORIDE SERPL-SCNC: 120 MMOL/L (ref 94–109)
CO2 SERPL-SCNC: 18 MMOL/L (ref 20–32)
CO2 SERPL-SCNC: 21 MMOL/L (ref 20–32)
CO2 SERPL-SCNC: 22 MMOL/L (ref 20–32)
CO2 SERPL-SCNC: 23 MMOL/L (ref 20–32)
CO2 SERPL-SCNC: 23 MMOL/L (ref 20–32)
CREAT SERPL-MCNC: 1.79 MG/DL (ref 0.52–1.04)
CREAT SERPL-MCNC: 1.8 MG/DL (ref 0.52–1.04)
CREAT SERPL-MCNC: 1.83 MG/DL (ref 0.52–1.04)
CREAT SERPL-MCNC: 1.85 MG/DL (ref 0.52–1.04)
CREAT SERPL-MCNC: 1.95 MG/DL (ref 0.52–1.04)
D DIMER PPP FEU-MCNC: 0.9 UG/ML FEU (ref 0–0.5)
D DIMER PPP FEU-MCNC: 1.1 UG/ML FEU (ref 0–0.5)
DIFFERENTIAL METHOD BLD: ABNORMAL
DIFFERENTIAL METHOD BLD: ABNORMAL
EOSINOPHIL # BLD AUTO: 0 10E9/L (ref 0–0.7)
EOSINOPHIL # BLD AUTO: 0 10E9/L (ref 0–0.7)
EOSINOPHIL NFR BLD AUTO: 0 %
EOSINOPHIL NFR BLD AUTO: 0 %
ERYTHROCYTE [DISTWIDTH] IN BLOOD BY AUTOMATED COUNT: 16.3 % (ref 10–15)
ERYTHROCYTE [DISTWIDTH] IN BLOOD BY AUTOMATED COUNT: 16.5 % (ref 10–15)
ERYTHROCYTE [DISTWIDTH] IN BLOOD BY AUTOMATED COUNT: 16.6 % (ref 10–15)
FIBRINOGEN PPP-MCNC: 257 MG/DL (ref 200–420)
FIBRINOGEN PPP-MCNC: 259 MG/DL (ref 200–420)
FIBRINOGEN PPP-MCNC: 268 MG/DL (ref 200–420)
FIBRINOGEN PPP-MCNC: 276 MG/DL (ref 200–420)
FIBRINOGEN PPP-MCNC: 279 MG/DL (ref 200–420)
GFR SERPL CREATININE-BSD FRML MDRD: 27 ML/MIN/1.7M2
GFR SERPL CREATININE-BSD FRML MDRD: 28 ML/MIN/1.7M2
GFR SERPL CREATININE-BSD FRML MDRD: 29 ML/MIN/1.7M2
GLUCOSE BLD-MCNC: 250 MG/DL (ref 70–99)
GLUCOSE BLDC GLUCOMTR-MCNC: 126 MG/DL (ref 70–99)
GLUCOSE BLDC GLUCOMTR-MCNC: 139 MG/DL (ref 70–99)
GLUCOSE BLDC GLUCOMTR-MCNC: 145 MG/DL (ref 70–99)
GLUCOSE BLDC GLUCOMTR-MCNC: 152 MG/DL (ref 70–99)
GLUCOSE BLDC GLUCOMTR-MCNC: 157 MG/DL (ref 70–99)
GLUCOSE BLDC GLUCOMTR-MCNC: 158 MG/DL (ref 70–99)
GLUCOSE BLDC GLUCOMTR-MCNC: 208 MG/DL (ref 70–99)
GLUCOSE BLDC GLUCOMTR-MCNC: 235 MG/DL (ref 70–99)
GLUCOSE BLDC GLUCOMTR-MCNC: 237 MG/DL (ref 70–99)
GLUCOSE BLDC GLUCOMTR-MCNC: 249 MG/DL (ref 70–99)
GLUCOSE BLDC GLUCOMTR-MCNC: 97 MG/DL (ref 70–99)
GLUCOSE SERPL-MCNC: 130 MG/DL (ref 70–99)
GLUCOSE SERPL-MCNC: 146 MG/DL (ref 70–99)
GLUCOSE SERPL-MCNC: 160 MG/DL (ref 70–99)
GLUCOSE SERPL-MCNC: 169 MG/DL (ref 70–99)
GLUCOSE SERPL-MCNC: 243 MG/DL (ref 70–99)
GRAM STN SPEC: NORMAL
GRAM STN SPEC: NORMAL
HCO3 BLD-SCNC: 20 MMOL/L (ref 21–28)
HCO3 BLD-SCNC: 21 MMOL/L (ref 21–28)
HCO3 BLD-SCNC: 22 MMOL/L (ref 21–28)
HCO3 BLD-SCNC: 22 MMOL/L (ref 21–28)
HCO3 BLDV-SCNC: 19 MMOL/L (ref 21–28)
HCO3 BLDV-SCNC: 20 MMOL/L (ref 21–28)
HCO3 BLDV-SCNC: 21 MMOL/L (ref 21–28)
HCO3 BLDV-SCNC: 21 MMOL/L (ref 21–28)
HCO3 BLDV-SCNC: 22 MMOL/L (ref 21–28)
HCO3 BLDV-SCNC: 23 MMOL/L (ref 21–28)
HCO3 BLDV-SCNC: 23 MMOL/L (ref 21–28)
HCT VFR BLD AUTO: 24.4 % (ref 35–47)
HCT VFR BLD AUTO: 24.9 % (ref 35–47)
HCT VFR BLD AUTO: 25.1 % (ref 35–47)
HCT VFR BLD AUTO: 26.8 % (ref 35–47)
HCT VFR BLD AUTO: 27.8 % (ref 35–47)
HGB BLD-MCNC: 8.1 G/DL (ref 11.7–15.7)
HGB BLD-MCNC: 8.2 G/DL (ref 11.7–15.7)
HGB BLD-MCNC: 8.3 G/DL (ref 11.7–15.7)
HGB BLD-MCNC: 8.7 G/DL (ref 11.7–15.7)
HGB BLD-MCNC: 9.1 G/DL (ref 11.7–15.7)
HGB BLD-MCNC: 9.1 G/DL (ref 11.7–15.7)
HGB FREE PLAS-MCNC: 40 MG/DL
IMM GRANULOCYTES # BLD: 0.1 10E9/L (ref 0–0.4)
IMM GRANULOCYTES # BLD: 0.2 10E9/L (ref 0–0.4)
IMM GRANULOCYTES NFR BLD: 0.5 %
IMM GRANULOCYTES NFR BLD: 0.8 %
INR PPP: 1.11 (ref 0.86–1.14)
INR PPP: 1.13 (ref 0.86–1.14)
INR PPP: 1.13 (ref 0.86–1.14)
INR PPP: 1.15 (ref 0.86–1.14)
INR PPP: 1.15 (ref 0.86–1.14)
INTERPRETATION ECG - MUSE: NORMAL
LACTATE BLD-SCNC: 1.5 MMOL/L (ref 0.7–2)
LYMPHOCYTES # BLD AUTO: 0.9 10E9/L (ref 0.8–5.3)
LYMPHOCYTES # BLD AUTO: 1 10E9/L (ref 0.8–5.3)
LYMPHOCYTES NFR BLD AUTO: 4.4 %
LYMPHOCYTES NFR BLD AUTO: 5 %
MAGNESIUM SERPL-MCNC: 2.2 MG/DL (ref 1.6–2.3)
MAGNESIUM SERPL-MCNC: 2.5 MG/DL (ref 1.6–2.3)
MCH RBC QN AUTO: 30.1 PG (ref 26.5–33)
MCH RBC QN AUTO: 30.3 PG (ref 26.5–33)
MCH RBC QN AUTO: 30.3 PG (ref 26.5–33)
MCH RBC QN AUTO: 30.4 PG (ref 26.5–33)
MCH RBC QN AUTO: 30.5 PG (ref 26.5–33)
MCHC RBC AUTO-ENTMCNC: 32.5 G/DL (ref 31.5–36.5)
MCHC RBC AUTO-ENTMCNC: 32.7 G/DL (ref 31.5–36.5)
MCHC RBC AUTO-ENTMCNC: 32.7 G/DL (ref 31.5–36.5)
MCHC RBC AUTO-ENTMCNC: 33.2 G/DL (ref 31.5–36.5)
MCHC RBC AUTO-ENTMCNC: 33.3 G/DL (ref 31.5–36.5)
MCV RBC AUTO: 91 FL (ref 78–100)
MCV RBC AUTO: 91 FL (ref 78–100)
MCV RBC AUTO: 92 FL (ref 78–100)
MCV RBC AUTO: 93 FL (ref 78–100)
MCV RBC AUTO: 94 FL (ref 78–100)
MONOCYTES # BLD AUTO: 0.6 10E9/L (ref 0–1.3)
MONOCYTES # BLD AUTO: 0.9 10E9/L (ref 0–1.3)
MONOCYTES NFR BLD AUTO: 3.1 %
MONOCYTES NFR BLD AUTO: 3.7 %
NEUTROPHILS # BLD AUTO: 16.9 10E9/L (ref 1.6–8.3)
NEUTROPHILS # BLD AUTO: 21.7 10E9/L (ref 1.6–8.3)
NEUTROPHILS NFR BLD AUTO: 91.1 %
NEUTROPHILS NFR BLD AUTO: 91.4 %
NRBC # BLD AUTO: 0.1 10*3/UL
NRBC # BLD AUTO: 0.1 10*3/UL
NRBC BLD AUTO-RTO: 1 /100
NRBC BLD AUTO-RTO: 1 /100
O2/TOTAL GAS SETTING VFR VENT: 100 %
O2/TOTAL GAS SETTING VFR VENT: 40 %
O2/TOTAL GAS SETTING VFR VENT: 50 %
OXYHGB MFR BLD: 97 % (ref 92–100)
OXYHGB MFR BLDV: 48 %
OXYHGB MFR BLDV: 50 %
OXYHGB MFR BLDV: 55 %
OXYHGB MFR BLDV: 57 %
OXYHGB MFR BLDV: 57 %
OXYHGB MFR BLDV: 58 %
OXYHGB MFR BLDV: 58 %
OXYHGB MFR BLDV: 61 %
OXYHGB MFR BLDV: 64 %
PCO2 BLD: 29 MM HG (ref 35–45)
PCO2 BLD: 32 MM HG (ref 35–45)
PCO2 BLD: 32 MM HG (ref 35–45)
PCO2 BLD: 33 MM HG (ref 35–45)
PCO2 BLDV: 32 MM HG (ref 40–50)
PCO2 BLDV: 33 MM HG (ref 40–50)
PCO2 BLDV: 34 MM HG (ref 40–50)
PCO2 BLDV: 34 MM HG (ref 40–50)
PCO2 BLDV: 35 MM HG (ref 40–50)
PCO2 BLDV: 35 MM HG (ref 40–50)
PCO2 BLDV: 36 MM HG (ref 40–50)
PCO2 BLDV: 37 MM HG (ref 40–50)
PCO2 BLDV: 39 MM HG (ref 40–50)
PH BLD: 7.42 PH (ref 7.35–7.45)
PH BLD: 7.42 PH (ref 7.35–7.45)
PH BLD: 7.43 PH (ref 7.35–7.45)
PH BLD: 7.43 PH (ref 7.35–7.45)
PH BLD: 7.44 PH (ref 7.35–7.45)
PH BLD: 7.45 PH (ref 7.35–7.45)
PH BLDV: 7.37 PH (ref 7.32–7.43)
PH BLDV: 7.38 PH (ref 7.32–7.43)
PH BLDV: 7.39 PH (ref 7.32–7.43)
PH BLDV: 7.4 PH (ref 7.32–7.43)
PHOSPHATE SERPL-MCNC: 4 MG/DL (ref 2.5–4.5)
PLATELET # BLD AUTO: 129 10E9/L (ref 150–450)
PLATELET # BLD AUTO: 144 10E9/L (ref 150–450)
PLATELET # BLD AUTO: 150 10E9/L (ref 150–450)
PLATELET # BLD AUTO: 160 10E9/L (ref 150–450)
PLATELET # BLD AUTO: 174 10E9/L (ref 150–450)
PO2 BLD: 164 MM HG (ref 80–105)
PO2 BLD: 170 MM HG (ref 80–105)
PO2 BLD: 172 MM HG (ref 80–105)
PO2 BLD: 196 MM HG (ref 80–105)
PO2 BLD: 201 MM HG (ref 80–105)
PO2 BLD: 542 MM HG (ref 80–105)
PO2 BLDV: 27 MM HG (ref 25–47)
PO2 BLDV: 28 MM HG (ref 25–47)
PO2 BLDV: 30 MM HG (ref 25–47)
PO2 BLDV: 30 MM HG (ref 25–47)
PO2 BLDV: 31 MM HG (ref 25–47)
PO2 BLDV: 33 MM HG (ref 25–47)
PO2 BLDV: 34 MM HG (ref 25–47)
POTASSIUM BLD-SCNC: 4.2 MMOL/L (ref 3.4–5.3)
POTASSIUM SERPL-SCNC: 3.9 MMOL/L (ref 3.4–5.3)
POTASSIUM SERPL-SCNC: 3.9 MMOL/L (ref 3.4–5.3)
POTASSIUM SERPL-SCNC: 4 MMOL/L (ref 3.4–5.3)
POTASSIUM SERPL-SCNC: 4.1 MMOL/L (ref 3.4–5.3)
POTASSIUM SERPL-SCNC: 4.2 MMOL/L (ref 3.4–5.3)
POTASSIUM SERPL-SCNC: 4.4 MMOL/L (ref 3.4–5.3)
RBC # BLD AUTO: 2.67 10E12/L (ref 3.8–5.2)
RBC # BLD AUTO: 2.72 10E12/L (ref 3.8–5.2)
RBC # BLD AUTO: 2.74 10E12/L (ref 3.8–5.2)
RBC # BLD AUTO: 2.86 10E12/L (ref 3.8–5.2)
RBC # BLD AUTO: 2.98 10E12/L (ref 3.8–5.2)
RESULT: NORMAL
RESULT: NORMAL
SEND OUTS MISC TEST CODE: NORMAL
SEND OUTS MISC TEST CODE: NORMAL
SEND OUTS MISC TEST SPECIMEN: NORMAL
SEND OUTS MISC TEST SPECIMEN: NORMAL
SODIUM BLD-SCNC: 144 MMOL/L (ref 133–144)
SODIUM SERPL-SCNC: 148 MMOL/L (ref 133–144)
SODIUM SERPL-SCNC: 150 MMOL/L (ref 133–144)
SODIUM SERPL-SCNC: 151 MMOL/L (ref 133–144)
SPECIMEN SOURCE: NORMAL
TACROLIMUS BLD-MCNC: 6.3 UG/L (ref 5–15)
TEST NAME: NORMAL
TEST NAME: NORMAL
TME LAST DOSE: NORMAL H
VANCOMYCIN SERPL-MCNC: 12.1 MG/L
WBC # BLD AUTO: 18.5 10E9/L (ref 4–11)
WBC # BLD AUTO: 18.7 10E9/L (ref 4–11)
WBC # BLD AUTO: 18.9 10E9/L (ref 4–11)
WBC # BLD AUTO: 20.2 10E9/L (ref 4–11)
WBC # BLD AUTO: 23.8 10E9/L (ref 4–11)

## 2018-03-05 PROCEDURE — 27210995 ZZH RX 272: Performed by: STUDENT IN AN ORGANIZED HEALTH CARE EDUCATION/TRAINING PROGRAM

## 2018-03-05 PROCEDURE — 86901 BLOOD TYPING SEROLOGIC RH(D): CPT | Performed by: SURGERY

## 2018-03-05 PROCEDURE — 20000004 ZZH R&B ICU UMMC

## 2018-03-05 PROCEDURE — 85730 THROMBOPLASTIN TIME PARTIAL: CPT | Performed by: THORACIC SURGERY (CARDIOTHORACIC VASCULAR SURGERY)

## 2018-03-05 PROCEDURE — 25000125 ZZHC RX 250

## 2018-03-05 PROCEDURE — G0463 HOSPITAL OUTPT CLINIC VISIT: HCPCS

## 2018-03-05 PROCEDURE — 85730 THROMBOPLASTIN TIME PARTIAL: CPT | Performed by: STUDENT IN AN ORGANIZED HEALTH CARE EDUCATION/TRAINING PROGRAM

## 2018-03-05 PROCEDURE — 83051 HEMOGLOBIN PLASMA: CPT | Performed by: THORACIC SURGERY (CARDIOTHORACIC VASCULAR SURGERY)

## 2018-03-05 PROCEDURE — 85025 COMPLETE CBC W/AUTO DIFF WBC: CPT | Performed by: THORACIC SURGERY (CARDIOTHORACIC VASCULAR SURGERY)

## 2018-03-05 PROCEDURE — 25000128 H RX IP 250 OP 636: Performed by: THORACIC SURGERY (CARDIOTHORACIC VASCULAR SURGERY)

## 2018-03-05 PROCEDURE — 25000131 ZZH RX MED GY IP 250 OP 636 PS 637: Performed by: THORACIC SURGERY (CARDIOTHORACIC VASCULAR SURGERY)

## 2018-03-05 PROCEDURE — 85610 PROTHROMBIN TIME: CPT | Performed by: THORACIC SURGERY (CARDIOTHORACIC VASCULAR SURGERY)

## 2018-03-05 PROCEDURE — 25000128 H RX IP 250 OP 636: Performed by: INTERNAL MEDICINE

## 2018-03-05 PROCEDURE — 00000146 ZZHCL STATISTIC GLUCOSE BY METER IP

## 2018-03-05 PROCEDURE — 85027 COMPLETE CBC AUTOMATED: CPT | Performed by: THORACIC SURGERY (CARDIOTHORACIC VASCULAR SURGERY)

## 2018-03-05 PROCEDURE — 85025 COMPLETE CBC W/AUTO DIFF WBC: CPT | Performed by: STUDENT IN AN ORGANIZED HEALTH CARE EDUCATION/TRAINING PROGRAM

## 2018-03-05 PROCEDURE — 84132 ASSAY OF SERUM POTASSIUM: CPT | Performed by: SURGERY

## 2018-03-05 PROCEDURE — 82805 BLOOD GASES W/O2 SATURATION: CPT | Performed by: SURGERY

## 2018-03-05 PROCEDURE — 25000128 H RX IP 250 OP 636: Performed by: ANESTHESIOLOGY

## 2018-03-05 PROCEDURE — 80069 RENAL FUNCTION PANEL: CPT | Performed by: THORACIC SURGERY (CARDIOTHORACIC VASCULAR SURGERY)

## 2018-03-05 PROCEDURE — 27210995 ZZH RX 272: Performed by: SURGERY

## 2018-03-05 PROCEDURE — 71045 X-RAY EXAM CHEST 1 VIEW: CPT

## 2018-03-05 PROCEDURE — 40000048 ZZH STATISTIC DAILY SWAN MONITORING

## 2018-03-05 PROCEDURE — 40000133 ZZH STATISTIC OT WARD VISIT

## 2018-03-05 PROCEDURE — 86850 RBC ANTIBODY SCREEN: CPT | Performed by: SURGERY

## 2018-03-05 PROCEDURE — 25000125 ZZHC RX 250: Performed by: THORACIC SURGERY (CARDIOTHORACIC VASCULAR SURGERY)

## 2018-03-05 PROCEDURE — 25000132 ZZH RX MED GY IP 250 OP 250 PS 637: Performed by: SURGERY

## 2018-03-05 PROCEDURE — 25000132 ZZH RX MED GY IP 250 OP 250 PS 637: Performed by: STUDENT IN AN ORGANIZED HEALTH CARE EDUCATION/TRAINING PROGRAM

## 2018-03-05 PROCEDURE — 40000986 XR CHEST PORT 1 VW

## 2018-03-05 PROCEDURE — 40000275 ZZH STATISTIC RCP TIME EA 10 MIN

## 2018-03-05 PROCEDURE — 25000128 H RX IP 250 OP 636: Performed by: STUDENT IN AN ORGANIZED HEALTH CARE EDUCATION/TRAINING PROGRAM

## 2018-03-05 PROCEDURE — 84460 ALANINE AMINO (ALT) (SGPT): CPT | Performed by: THORACIC SURGERY (CARDIOTHORACIC VASCULAR SURGERY)

## 2018-03-05 PROCEDURE — 97110 THERAPEUTIC EXERCISES: CPT | Mod: GO

## 2018-03-05 PROCEDURE — 25000125 ZZHC RX 250: Performed by: STUDENT IN AN ORGANIZED HEALTH CARE EDUCATION/TRAINING PROGRAM

## 2018-03-05 PROCEDURE — 94640 AIRWAY INHALATION TREATMENT: CPT

## 2018-03-05 PROCEDURE — 40000196 ZZH STATISTIC RAPCV CVP MONITORING

## 2018-03-05 PROCEDURE — 82805 BLOOD GASES W/O2 SATURATION: CPT | Performed by: STUDENT IN AN ORGANIZED HEALTH CARE EDUCATION/TRAINING PROGRAM

## 2018-03-05 PROCEDURE — 86900 BLOOD TYPING SEROLOGIC ABO: CPT | Performed by: SURGERY

## 2018-03-05 PROCEDURE — 86923 COMPATIBILITY TEST ELECTRIC: CPT | Performed by: SURGERY

## 2018-03-05 PROCEDURE — 94640 AIRWAY INHALATION TREATMENT: CPT | Mod: 76

## 2018-03-05 PROCEDURE — 25000132 ZZH RX MED GY IP 250 OP 250 PS 637: Performed by: THORACIC SURGERY (CARDIOTHORACIC VASCULAR SURGERY)

## 2018-03-05 PROCEDURE — 82805 BLOOD GASES W/O2 SATURATION: CPT | Performed by: ANESTHESIOLOGY

## 2018-03-05 PROCEDURE — 83735 ASSAY OF MAGNESIUM: CPT | Performed by: SURGERY

## 2018-03-05 PROCEDURE — 85384 FIBRINOGEN ACTIVITY: CPT | Performed by: STUDENT IN AN ORGANIZED HEALTH CARE EDUCATION/TRAINING PROGRAM

## 2018-03-05 PROCEDURE — 80202 ASSAY OF VANCOMYCIN: CPT | Performed by: SURGERY

## 2018-03-05 PROCEDURE — 94645 CONT INHLJ TX EACH ADDL HOUR: CPT

## 2018-03-05 PROCEDURE — 25000132 ZZH RX MED GY IP 250 OP 250 PS 637: Performed by: PHYSICIAN ASSISTANT

## 2018-03-05 PROCEDURE — 25000128 H RX IP 250 OP 636: Performed by: SURGERY

## 2018-03-05 PROCEDURE — 94003 VENT MGMT INPAT SUBQ DAY: CPT

## 2018-03-05 PROCEDURE — 97167 OT EVAL HIGH COMPLEX 60 MIN: CPT | Mod: GO

## 2018-03-05 PROCEDURE — 80048 BASIC METABOLIC PNL TOTAL CA: CPT | Performed by: THORACIC SURGERY (CARDIOTHORACIC VASCULAR SURGERY)

## 2018-03-05 PROCEDURE — 82803 BLOOD GASES ANY COMBINATION: CPT | Performed by: SURGERY

## 2018-03-05 PROCEDURE — 99291 CRITICAL CARE FIRST HOUR: CPT | Mod: GC | Performed by: ANESTHESIOLOGY

## 2018-03-05 PROCEDURE — 85384 FIBRINOGEN ACTIVITY: CPT | Performed by: THORACIC SURGERY (CARDIOTHORACIC VASCULAR SURGERY)

## 2018-03-05 PROCEDURE — 94668 MNPJ CHEST WALL SBSQ: CPT

## 2018-03-05 PROCEDURE — 85300 ANTITHROMBIN III ACTIVITY: CPT | Performed by: THORACIC SURGERY (CARDIOTHORACIC VASCULAR SURGERY)

## 2018-03-05 PROCEDURE — 93010 ELECTROCARDIOGRAM REPORT: CPT | Performed by: INTERNAL MEDICINE

## 2018-03-05 PROCEDURE — 85610 PROTHROMBIN TIME: CPT | Performed by: STUDENT IN AN ORGANIZED HEALTH CARE EDUCATION/TRAINING PROGRAM

## 2018-03-05 PROCEDURE — 40000014 ZZH STATISTIC ARTERIAL MONITORING DAILY

## 2018-03-05 PROCEDURE — 85379 FIBRIN DEGRADATION QUANT: CPT | Performed by: THORACIC SURGERY (CARDIOTHORACIC VASCULAR SURGERY)

## 2018-03-05 PROCEDURE — 80048 BASIC METABOLIC PNL TOTAL CA: CPT | Performed by: STUDENT IN AN ORGANIZED HEALTH CARE EDUCATION/TRAINING PROGRAM

## 2018-03-05 PROCEDURE — 93005 ELECTROCARDIOGRAM TRACING: CPT

## 2018-03-05 PROCEDURE — 80197 ASSAY OF TACROLIMUS: CPT | Performed by: THORACIC SURGERY (CARDIOTHORACIC VASCULAR SURGERY)

## 2018-03-05 PROCEDURE — 83735 ASSAY OF MAGNESIUM: CPT | Performed by: THORACIC SURGERY (CARDIOTHORACIC VASCULAR SURGERY)

## 2018-03-05 PROCEDURE — 25800025 ZZH RX 258: Performed by: STUDENT IN AN ORGANIZED HEALTH CARE EDUCATION/TRAINING PROGRAM

## 2018-03-05 RX ORDER — METOPROLOL TARTRATE 1 MG/ML
5 INJECTION, SOLUTION INTRAVENOUS EVERY 5 MIN PRN
Status: DISCONTINUED | OUTPATIENT
Start: 2018-03-05 | End: 2018-03-20

## 2018-03-05 RX ORDER — LEVALBUTEROL TARTRATE 45 UG/1
2 AEROSOL, METERED ORAL
Status: DISCONTINUED | OUTPATIENT
Start: 2018-03-05 | End: 2018-03-24 | Stop reason: HOSPADM

## 2018-03-05 RX ORDER — DILTIAZEM HYDROCHLORIDE 5 MG/ML
INJECTION INTRAVENOUS
Status: DISCONTINUED
Start: 2018-03-05 | End: 2018-03-10 | Stop reason: HOSPADM

## 2018-03-05 RX ORDER — POLYETHYLENE GLYCOL 3350 17 G/17G
17 POWDER, FOR SOLUTION ORAL 2 TIMES DAILY
Status: DISCONTINUED | OUTPATIENT
Start: 2018-03-05 | End: 2018-03-06

## 2018-03-05 RX ORDER — METOPROLOL TARTRATE 1 MG/ML
5 INJECTION, SOLUTION INTRAVENOUS ONCE
Status: COMPLETED | OUTPATIENT
Start: 2018-03-05 | End: 2018-03-05

## 2018-03-05 RX ORDER — DILTIAZEM HYDROCHLORIDE 5 MG/ML
5 INJECTION INTRAVENOUS ONCE
Status: COMPLETED | OUTPATIENT
Start: 2018-03-05 | End: 2018-03-05

## 2018-03-05 RX ORDER — VANCOMYCIN HYDROCHLORIDE 1 G/200ML
1000 INJECTION, SOLUTION INTRAVENOUS ONCE
Status: COMPLETED | OUTPATIENT
Start: 2018-03-05 | End: 2018-03-06

## 2018-03-05 RX ORDER — METOPROLOL TARTRATE 1 MG/ML
INJECTION, SOLUTION INTRAVENOUS
Status: COMPLETED
Start: 2018-03-05 | End: 2018-03-05

## 2018-03-05 RX ORDER — HEPARIN SODIUM 5000 [USP'U]/.5ML
5000 INJECTION, SOLUTION INTRAVENOUS; SUBCUTANEOUS EVERY 8 HOURS
Status: DISCONTINUED | OUTPATIENT
Start: 2018-03-05 | End: 2018-03-07

## 2018-03-05 RX ORDER — FUROSEMIDE 10 MG/ML
40 INJECTION INTRAMUSCULAR; INTRAVENOUS ONCE
Status: COMPLETED | OUTPATIENT
Start: 2018-03-05 | End: 2018-03-05

## 2018-03-05 RX ORDER — SENNA AND DOCUSATE SODIUM 50; 8.6 MG/1; MG/1
2 TABLET, FILM COATED ORAL 2 TIMES DAILY
Status: DISCONTINUED | OUTPATIENT
Start: 2018-03-06 | End: 2018-03-05

## 2018-03-05 RX ORDER — DEXTROSE MONOHYDRATE, SODIUM CHLORIDE, AND POTASSIUM CHLORIDE 50; 1.49; 4.5 G/1000ML; G/1000ML; G/1000ML
INJECTION, SOLUTION INTRAVENOUS CONTINUOUS
Status: DISCONTINUED | OUTPATIENT
Start: 2018-03-05 | End: 2018-03-06

## 2018-03-05 RX ORDER — BISACODYL 10 MG
10 SUPPOSITORY, RECTAL RECTAL ONCE
Status: COMPLETED | OUTPATIENT
Start: 2018-03-05 | End: 2018-03-05

## 2018-03-05 RX ADMIN — METOPROLOL TARTRATE 5 MG: 5 INJECTION INTRAVENOUS at 11:43

## 2018-03-05 RX ADMIN — EPOPROSTENOL 12 NG/KG/MIN: 1.5 INJECTION, POWDER, LYOPHILIZED, FOR SOLUTION INTRAVENOUS at 20:58

## 2018-03-05 RX ADMIN — Medication 3 MG: at 11:05

## 2018-03-05 RX ADMIN — VANCOMYCIN HYDROCHLORIDE 1000 MG: 1 INJECTION, SOLUTION INTRAVENOUS at 23:37

## 2018-03-05 RX ADMIN — PROPOFOL 30 MCG/KG/MIN: 10 INJECTION, EMULSION INTRAVENOUS at 14:01

## 2018-03-05 RX ADMIN — PIPERACILLIN AND TAZOBACTAM 3.38 G: 3; .375 INJECTION, POWDER, LYOPHILIZED, FOR SOLUTION INTRAVENOUS; PARENTERAL at 02:56

## 2018-03-05 RX ADMIN — MINERAL OIL AND WHITE PETROLATUM: 150; 830 OINTMENT OPHTHALMIC at 17:24

## 2018-03-05 RX ADMIN — MINERAL OIL AND WHITE PETROLATUM: 150; 830 OINTMENT OPHTHALMIC at 09:55

## 2018-03-05 RX ADMIN — HEPARIN SODIUM 5000 UNITS: 5000 INJECTION, SOLUTION INTRAVENOUS; SUBCUTANEOUS at 09:17

## 2018-03-05 RX ADMIN — PREDNISOLONE SODIUM PHOSPHATE 12.9 MG: 15 SOLUTION ORAL at 19:53

## 2018-03-05 RX ADMIN — EPINEPHRINE 0.01 MCG/KG/MIN: 1 INJECTION PARENTERAL at 14:23

## 2018-03-05 RX ADMIN — POLYETHYLENE GLYCOL 3350 17 G: 17 POWDER, FOR SOLUTION ORAL at 19:51

## 2018-03-05 RX ADMIN — MULTIVITAMIN 15 ML: LIQUID ORAL at 09:57

## 2018-03-05 RX ADMIN — VASOPRESSIN 0.8 UNITS/HR: 20 INJECTION, SOLUTION INTRAMUSCULAR; SUBCUTANEOUS at 14:06

## 2018-03-05 RX ADMIN — PROPOFOL 25 MCG/KG/MIN: 10 INJECTION, EMULSION INTRAVENOUS at 02:55

## 2018-03-05 RX ADMIN — POTASSIUM CHLORIDE 20 MEQ: 1.5 POWDER, FOR SOLUTION ORAL at 22:47

## 2018-03-05 RX ADMIN — EPOPROSTENOL 20 NG/KG/MIN: 1.5 INJECTION, POWDER, LYOPHILIZED, FOR SOLUTION INTRAVENOUS at 03:00

## 2018-03-05 RX ADMIN — PANTOPRAZOLE SODIUM 40 MG: 40 INJECTION, POWDER, FOR SOLUTION INTRAVENOUS at 09:54

## 2018-03-05 RX ADMIN — TACROLIMUS 15 MCG/HR: 5 INJECTION, SOLUTION INTRAVENOUS at 22:48

## 2018-03-05 RX ADMIN — SILDENAFIL CITRATE 10 MG: 10 POWDER, FOR SUSPENSION ORAL at 09:58

## 2018-03-05 RX ADMIN — METOROPROLOL TARTRATE 5 MG: 5 INJECTION, SOLUTION INTRAVENOUS at 10:44

## 2018-03-05 RX ADMIN — DOCUSATE SODIUM 100 MG: 50 LIQUID ORAL at 09:57

## 2018-03-05 RX ADMIN — DILTIAZEM HYDROCHLORIDE 5 MG: 5 INJECTION INTRAVENOUS at 10:40

## 2018-03-05 RX ADMIN — Medication 1 PACKET: at 11:05

## 2018-03-05 RX ADMIN — MYCOPHENOLATE MOFETIL 1500 MG: 200 POWDER, FOR SUSPENSION ORAL at 17:24

## 2018-03-05 RX ADMIN — PIPERACILLIN AND TAZOBACTAM 3.38 G: 3; .375 INJECTION, POWDER, LYOPHILIZED, FOR SOLUTION INTRAVENOUS; PARENTERAL at 15:45

## 2018-03-05 RX ADMIN — PIPERACILLIN AND TAZOBACTAM 3.38 G: 3; .375 INJECTION, POWDER, LYOPHILIZED, FOR SOLUTION INTRAVENOUS; PARENTERAL at 09:18

## 2018-03-05 RX ADMIN — VASOPRESSIN 0.8 UNITS/HR: 20 INJECTION, SOLUTION INTRAMUSCULAR; SUBCUTANEOUS at 22:47

## 2018-03-05 RX ADMIN — NYSTATIN 1000000 UNITS: 500000 SUSPENSION ORAL at 12:14

## 2018-03-05 RX ADMIN — PREDNISOLONE SODIUM PHOSPHATE 12.9 MG: 15 SOLUTION ORAL at 09:58

## 2018-03-05 RX ADMIN — MINERAL OIL AND WHITE PETROLATUM: 150; 830 OINTMENT OPHTHALMIC at 03:39

## 2018-03-05 RX ADMIN — Medication 3 MG: at 19:54

## 2018-03-05 RX ADMIN — SILDENAFIL CITRATE 10 MG: 10 POWDER, FOR SUSPENSION ORAL at 01:09

## 2018-03-05 RX ADMIN — OXYCODONE HYDROCHLORIDE 5 MG: 5 TABLET ORAL at 12:14

## 2018-03-05 RX ADMIN — Medication 3 MG: at 15:24

## 2018-03-05 RX ADMIN — PROPOFOL 25 MCG/KG/MIN: 10 INJECTION, EMULSION INTRAVENOUS at 23:47

## 2018-03-05 RX ADMIN — HUMAN INSULIN 6 UNITS/HR: 100 INJECTION, SOLUTION SUBCUTANEOUS at 14:14

## 2018-03-05 RX ADMIN — Medication 1 PACKET: at 19:59

## 2018-03-05 RX ADMIN — ALBUTEROL SULFATE 6 PUFF: 90 AEROSOL, METERED RESPIRATORY (INHALATION) at 04:10

## 2018-03-05 RX ADMIN — HUMAN INSULIN 5 UNITS/HR: 100 INJECTION, SOLUTION SUBCUTANEOUS at 22:18

## 2018-03-05 RX ADMIN — Medication 5 ML: at 19:53

## 2018-03-05 RX ADMIN — BISACODYL 10 MG: 10 SUPPOSITORY RECTAL at 11:19

## 2018-03-05 RX ADMIN — PIPERACILLIN AND TAZOBACTAM 3.38 G: 3; .375 INJECTION, POWDER, LYOPHILIZED, FOR SOLUTION INTRAVENOUS; PARENTERAL at 20:52

## 2018-03-05 RX ADMIN — MINERAL OIL AND WHITE PETROLATUM: 150; 830 OINTMENT OPHTHALMIC at 22:15

## 2018-03-05 RX ADMIN — EPOPROSTENOL 18 NG/KG/MIN: 1.5 INJECTION, POWDER, LYOPHILIZED, FOR SOLUTION INTRAVENOUS at 10:49

## 2018-03-05 RX ADMIN — HEPARIN SODIUM 5000 UNITS: 5000 INJECTION, SOLUTION INTRAVENOUS; SUBCUTANEOUS at 17:24

## 2018-03-05 RX ADMIN — METOPROLOL TARTRATE 5 MG: 1 INJECTION, SOLUTION INTRAVENOUS at 10:44

## 2018-03-05 RX ADMIN — FUROSEMIDE 40 MG: 10 INJECTION, SOLUTION INTRAVENOUS at 09:50

## 2018-03-05 RX ADMIN — NYSTATIN 1000000 UNITS: 500000 SUSPENSION ORAL at 17:24

## 2018-03-05 RX ADMIN — DOCUSATE SODIUM 100 MG: 50 LIQUID ORAL at 19:53

## 2018-03-05 RX ADMIN — POTASSIUM CHLORIDE, DEXTROSE MONOHYDRATE AND SODIUM CHLORIDE: 150; 5; 450 INJECTION, SOLUTION INTRAVENOUS at 11:00

## 2018-03-05 RX ADMIN — FENTANYL CITRATE 100 MCG/HR: 50 INJECTION, SOLUTION INTRAMUSCULAR; INTRAVENOUS at 00:04

## 2018-03-05 RX ADMIN — NYSTATIN 1000000 UNITS: 500000 SUSPENSION ORAL at 10:02

## 2018-03-05 RX ADMIN — MYCOPHENOLATE MOFETIL 1500 MG: 200 POWDER, FOR SUSPENSION ORAL at 09:58

## 2018-03-05 RX ADMIN — POLYETHYLENE GLYCOL 3350 17 G: 17 POWDER, FOR SOLUTION ORAL at 09:58

## 2018-03-05 RX ADMIN — HYDROMORPHONE HYDROCHLORIDE 0.5 MG/HR: 10 INJECTION, SOLUTION INTRAMUSCULAR; INTRAVENOUS; SUBCUTANEOUS at 18:07

## 2018-03-05 RX ADMIN — ALBUTEROL SULFATE 6 PUFF: 90 AEROSOL, METERED RESPIRATORY (INHALATION) at 09:22

## 2018-03-05 RX ADMIN — LEVALBUTEROL TARTRATE 2 PUFF: 45 AEROSOL, METERED ORAL at 20:53

## 2018-03-05 RX ADMIN — SILDENAFIL CITRATE 10 MG: 10 POWDER, FOR SUSPENSION ORAL at 17:24

## 2018-03-05 RX ADMIN — NYSTATIN 1000000 UNITS: 500000 SUSPENSION ORAL at 19:51

## 2018-03-05 RX ADMIN — ALBUTEROL SULFATE 6 PUFF: 90 AEROSOL, METERED RESPIRATORY (INHALATION) at 00:10

## 2018-03-05 NOTE — PLAN OF CARE
Problem: Patient Care Overview  Goal: Plan of Care/Patient Progress Review  Attempted to wean flolan again today, went to 18ng at 0900. At 1000 PaO2 decreased and pt began to have more PACs at 1100 so flolan returned to 20ng. Sildenafil started at 0930.  MAPs began to decrease at 1300 into the mid 50s and were not responding to increases in Epi. Vaso started at 1.2 units/h and MAPS quickly responded. SVR stayed below 1000. Pt also pan cultured d/t map drop and fever of 101. Abx changed this am then changed back after drop in pressures. Chest tube output minimal, UOP 30 cc/h, see flow sheets for more details.

## 2018-03-05 NOTE — PROGRESS NOTES
[facility]  WOC Nurse Inpatient Pressure Injury Assessment       Pressure Injury Present: Yes, bridge of nose (see below) Stage 2  Support Surface:  Low air loss mattress [employer]olibrium         Patient History: Per MD Note:56 yo lady with acute on chronic hypoxic respiratory failure from ILD S/p V-A ECMO for R heart failure on 18 now s/p BLST on 3/1/18   Current Diet/Nutrition:   Active Diet Order      NPO for Medical/Clinical Reasons Except for: No Exceptions    Marcus: Marcus Score  Av.5  Min: 11  Max: 20     Labs:   Recent Labs   Lab Test  18   0842  18   0355   18   ALBUMIN   --   2.1*   < >  1.7*   HGB  8.2*  8.1*   < >  9.6*   INR  1.13  1.15*   < >  1.86*   WBC  18.9*  18.5*   < >  11.3*   A1C   --    --    --   5.5    < > = values in this interval not displayed.       Pressure Injury: on bridge of nose  hospital acquired   This is a Medical Device Related Pressure Injury (MDRPI) due to BiPAP mask  Pressure Injury is Stage 2   Status: healing     Physical Exam  Wound Location:  Bridge of [facility]             Wound History: pt with BiPAP mask prior to ECMO cannulation.  Wound noted when mask removed   Measurements (length x width x depth, in cm) 0.6 cm x 0.3 cm  x  0.1 cm   Wound Base:  100 % thin red fibrinous scab   Palpation of the wound bed: normal   Periwound skin: intact  Color: normal and consistent with surrounding tissue  Temperature: normal   Drainage:, none  Pain: no grimacing or signs of discomfort,     Plan of Care for: nose  Pressure source removed, no intervention necessary unless BiPAP resumed        WOC to follow up: twice weekly     Face to Face Time: 5 minutes

## 2018-03-05 NOTE — PROGRESS NOTES
I personally saw, examined and evaluated the patient. I agree with the above documentation, assessment and plan, as outlined in the Resident/Fellow/NP/PA's note.    Assessment: 57 year old female with PMH significant for dermatomyositis on immunosuppression, RA, ILD, and pulmonary hypertension, who was admitted for emergent lung transplant work-up on 2/21 for increasing shortness of breath and hypoxia. S/P emergent [facility] ECMO placement on 2/27/18. S/P bilateral lung transplant on 3/1.    Critical Care Diagnoses:  1. Ventilator Dependent Respiratory Failure      2. Cardiogenic Shock      3. Vasoplegic Shock      4. Pulmonary HTN      5. Acute Right Ventricular Heart Failure      6. Anemia      7. Coagulopathy      8. Constipation       9. Concern for Protein-Calorie Malnutrition      10. Acute Renal Failure with Hyperchloremia     Plan:     1. Wean vasopressin gtt to 0.8  2. Wean inhaled Flolan slowly   3. Wean fentanyl gtt off  4. Start PO oxycodone PRN  5. Wean epinephrine gtt to 0.03  6. Advance TFs  7. Suppository for constipation  8. Goal fluid balance of -500cc to -750cc by tomorrow      Total Critical Care Time: 41 minutes    Dr. Shree Grant M.D.

## 2018-03-05 NOTE — PROGRESS NOTES
CVTS PROGRESS NOTE  03/05/2018    ASSESSMENT: Kecia Blue is a 57 year old female with PMH significant for dermatomyositis on immunosuppression, eronegative RA, ILD, and pulmonary hypertension, who was admitted for emergent lung transplant work-up on 2/21 for increasing shortness of breath and hypoxia now s/p emergent [facility] ECMO placement on 2/27/18. Now s/p bilateral lung transplant on 3/1 and [facility] ECMO decannulation on 3/3 with Dr. Hernandez.      TODAY'S PROGRESS/PLAN  - aggressive bowel regimen  - advance tube feeds  - possibly remove anterior apical chest tubes  - wean sedation/sedation holiday  - net negative I/O 500-750ml/24 hours  - remove right IJ and brachial arterial line  - wean flolan  - add D5 1/2NS for hypernatremia    PLAN:  Neuro/ pain/ sedation:  # Postoperative acute pain  # Sedation for mechanical ventilation  - Monitor neurological status. Notify the MD for any acute changes in exam.  - Fentanyl gtt for pain. Transition to PO/IV prn pain meds.   - Propofol gtt for sedation.     Pulmonary care:  # Acute on chronic respiratory failure, worsening, s/p bilateral lung transplantation 3/1/2018  # ILD related to dermatomyositis   # Severe pulmonary hypertension (WHO class III)  # Pneumomediastinum, 2/22/2018  # [facility] ECMO decannulated 3/3   - Supplemental oxygen via ventilator to keep saturation above 92 %.  - Flolan, wean as able by 2ppm q2h as able.  - Nitric oxide weaned off 3/1    Cardiovascular:  # Right heart failure  # Right ventricular hypertrophy  # Pulmonary HTN  # Postoperative atrial fibrillation with RVR    - Monitor hemodynamic status.   - Wean pressors as able  - PRN metoprolol for rate control    GI care:  #Constipation   - NPO except ice chips and medications.  - NJ placed  - Advance TFs, check OG residuals  - Aggressive bowel regimen    Fluids/ Electrolytes/ Nutrition:  #Hypernatremia   - Periodic fluid bolus for IV fluid hydration  - Parenteral nutrition to start with placement of  NG/NJ tube.    Renal/ Fluid Balance:  #Acute kidney injury  - Urine output is adequate so far.  - Cr improving  - Diurese with lasix for net negative goal 500-750mL negative  - Will continue to monitor intake and output.    Endocrine:  #Stress hyperglycemia    - Insulin gtt     ID/ Antibiotics:  #Febrile  - Anti-rejection and immunosuppressant medications per transplant team  - Donor lungs with MRSA, coverage for 2 weeks with vanco/zosyn continued with increased fever  - Unasyn added for culture stopped after one dose to continue broad culture  - Pan culture 3/4 with increased fever    Heme:  #Acute blood loss anemia     - Hemoglobin 8.1 <- 9.2. Monitor, transfuse if Hgb < 7.     Prophylaxis:    - Mechanical prophylaxis for DVT.   - Heparin SubQ BID watching for acute signs of bleeding  - PPI    Lines/ tubes/ drains:  - LIJ MAC/swan, femoral arterial line, PIV, ETT, Basilio, Chest tubes    Disposition:  - CVICU.     Seen with Dr. Marvin.    Trino Álvarez MD  General Surgery PGY-3  Pager [phone]    ====================================    TODAY'S PROGRESS:   SUBJECTIVE:   - NAEO  - Afib with RVR this AM responded well to 5 metop and 5 cardizem  - unable to wean flolan yesterday due to high PAP    OBJECTIVE:   1. VITAL SIGNS:   Temp:  [99.7  F (37.6  C)-101.5  F (38.6  C)] 101.3  F (38.5  C)  Heart Rate:  [] 103  Resp:  [16-20] 20  MAP:  [58 mmHg-102 mmHg] 77 mmHg  Arterial Line BP: ()/(49-82) 100/66  FiO2 (%):  [40 %-50 %] 50 %  SpO2:  [93 %-100 %] 100 %  Ventilation Mode: CMV/AC  (Continuous Mandatory Ventilation/ Assist Control)  FiO2 (%): 50 %  Rate Set (breaths/minute): 16 breaths/min  Tidal Volume Set (mL): 380 mL  PEEP (cm H2O): 8 cmH2O  Oxygen Concentration (%): 50 %  Resp: 20    2. INTAKE/ OUTPUT:   I/O last 3 completed shifts:  In: 2119.73 [I.V.:1314.73; NG/GT:515]  Out: 2890 [Urine:1875; Emesis/NG output:325; Chest Tube:690]    3. PHYSICAL EXAMINATION:   General: Sedated, NAD  Neuro: Sedated,  NAD.   Resp: Intubated, mechanically vented  CV: afib, tachycardic  Abdomen: Soft, mildly distended  Incisions: c/d/i  Extremities: warm and well perfused    4. INVESTIGATIONS:   Arterial Blood Gases     Recent Labs  Lab 03/05/18  1324 03/05/18  0842 03/05/18  0355 03/04/18  2355   PH 7.45 7.43 7.42 7.42   PCO2 29* 32* 33* 33*   PO2 164* 172* 170* 201*   HCO3 20* 21 22 21     Complete Blood Count     Recent Labs  Lab 03/05/18  1132 03/05/18  0842 03/05/18  0355 03/04/18  2355   WBC 23.8* 18.9* 18.5* 18.7*   HGB 9.1* 8.2* 8.1* 8.3*    144* 129* 150     Basic Metabolic Panel    Recent Labs  Lab 03/05/18  1132 03/05/18  0842 03/05/18  0355 03/04/18  2355   * 150* 151* 150*   POTASSIUM 4.4 4.2 4.0 4.1   CHLORIDE 118* 117* 120* 119*   CO2 18* 22 21 23   BUN 70* 70* 69* 73*   CR 1.79* 1.80* 1.83* 1.95*   * 146* 130* 169*     Liver Function Tests    Recent Labs  Lab 03/05/18  1132 03/05/18  0842 03/05/18  0355 03/04/18  2355  03/04/18  0353  03/03/18  0329  03/02/18  0330  03/01/18 2017 03/01/18  0356   AST  --   --   --   --   --   --   --  34  --   --   --  38  --  58*   ALT  --  40  --   --   --  40  --  29  --  38  --  65*  --  172*   ALKPHOS  --   --   --   --   --   --   --  27*  --   --   --  35*  --  37*   BILITOTAL  --   --   --   --   --   --   --  1.5*  --   --   --  1.2  --  0.7   ALBUMIN  --   --  2.1*  --   --  2.5*  --  2.4*  --  2.9*  --  1.7*  --  2.4*   INR 1.13 1.13 1.15* 1.15*  < > 1.12  < > 1.36*  < > 1.51*  < > 1.86*  < > 1.51*   < > = values in this interval not displayed.  Pancreatic Enzymes  No lab results found in last 7 days.  Coagulation Profile    Recent Labs  Lab 03/05/18  1132 03/05/18  0842 03/05/18  0355 03/04/18  2355   INR 1.13 1.13 1.15* 1.15*   PTT 27 25 26 26     Lactate  Invalid input(s): LACTATE    5. RADIOLOGY:   Recent Results (from the past 24 hour(s))   XR Chest Port 1 View    Narrative    Exam:  Chest radiograph, 3/5/2018 1:09 AM    History: Postop lung  transplant candidate, lung disease, interstitial    Comparison:  3/4/2018    Findings:  Single portable AP view of the chest. Endotracheal tube in  the mid thoracic trachea. Right [place]-Supriya catheter tip projects over  the main pulmonary artery. Right IJ central venous catheter tip  projects over the low SVC. Enteric tubes course below the level of  diaphragm with tip and sidehole of nasogastric tube over stomach and  feeding tube tip and sidehole outside field of view. Stable bilateral  chest tubes/mediastinal drain.  Partially visualized peripheral IV  catheter in upper right arm.  Median sternotomy.    The cardiomediastinal silhouette is stable. Pulmonary vasculature is  indistinct. No pleural effusion or pneumothorax. No significant change  in left perihilar opacities.      Impression    Impression:    1. Stable support devices.  2. Unchanged left perihilar opacities which may represent pulmonary  edema versus infection.  3. Pulmonary vascular congestion.    I have personally reviewed the examination and initial interpretation  and I agree with the findings.    KEYLA DACOSTA MD       =========================================

## 2018-03-05 NOTE — PROGRESS NOTES
03/05/18 0825   Quick Adds   Type of Visit Occupational Therapy Re-evaluation   Living Environment   Living Environment Comment see initial OT evaluation for more information   Self-Care   Activity/Exercise/Self-Care Comment see initial OT evaluation for more information   Functional Level Prior   Prior Functional Level Comment see initial OT evaluation for more information   General Information   Onset of Illness/Injury or Date of Surgery - Date 02/27/18   Referring Physician Caryn Xie MD   Patient/Family Goals Statement pt intubated, unable to state   Additional Occupational Profile Info/Pertinent History of Current Problem 56 yo lady with acute on chronic hypoxic respiratory failure from ILD S/p V-A ECMO for R heart failure on 2/27/18 now s/p BLST on 3/1/18   Precautions/Limitations sternal precautions  (thoracotomy)   General Observations Pt presents with decreased I with ADLs, functional mobility and transfers.   Cognitive Status Examination   Cognitive Comment Pt intubated, unable to  verbalize.   Visual Perception   Visual Perception Comments see initial OT eval   Sensory Examination   Sensory Comments see initial OT eval   Range of Motion (ROM)   ROM Comment wfl   Strength   Strength Comments unable to test    Mobility   Bed Mobility Comments dependent   Transfer Skills   Transfer Comments dependent   Instrumental Activities of Daily Living (IADL)   IADL Comments see initial OT eval   Activities of Daily Living Analysis   Impairments Contributing to Impaired Activities of Daily Living strength decreased;post surgical precautions;pain;ROM decreased   General Therapy Interventions   Planned Therapy Interventions ADL retraining;IADL retraining;bed mobility training;cognition;ROM;strengthening;transfer training;home program guidelines;progressive activity/exercise;risk factor education   Clinical Impression   Criteria for Skilled Therapeutic Interventions Met yes, treatment indicated   OT  "Diagnosis Pt presents with decreased I with ADLs, functional mobility and transfers.   Influenced by the following impairments post surgical precautions, medical status   Assessment of Occupational Performance 3-5 Performance Deficits   Identified Performance Deficits g/h, dressing, transfers, home mgmt   Clinical Decision Making (Complexity) Moderate complexity   Therapy Frequency daily   Predicted Duration of Therapy Intervention (days/wks) 3 weeks   Anticipated Discharge Disposition Transitional Care Facility;Acute Rehabilitation Facility   Risks and Benefits of Treatment have been explained. Yes   Patient, Family & other staff in agreement with plan of care Yes   [facility] AM-PAC  \"6 Clicks\" Daily Activity Inpatient Short Form   1. Putting on and taking off regular lower body clothing? 1 - Total   2. Bathing (including washing, rinsing, drying)? 1 - Total   3. Toileting, which includes using toilet, bedpan or urinal? 1 - Total   4. Putting on and taking off regular upper body clothing? 1 - Total   5. Taking care of personal grooming such as brushing teeth? 1 - Total   6. Eating meals? 1 - Total   Daily Activity Raw Score (Score out of 24.Lower scores equate to lower levels of function) 6   Total Evaluation Time   Total Evaluation Time (Minutes) 5     "

## 2018-03-05 NOTE — PROGRESS NOTES
CVICU PROGRESS NOTE  03/05/2018    ASSESSMENT: Kecia Blue is a 57 year old female with PMH significant for dermatomyositis on immunosuppression, eronegative RA, ILD, and pulmonary hypertension, who was admitted for emergent lung transplant work-up on 2/21 for increasing shortness of breath and hypoxia now s/p emergent [facility] ECMO placement on 2/27/18. Now s/p bilateral lung transplant on 3/1 and [facility] ECMO decannulation on 3/3 with Dr. Hernandez.      TODAY'S PROGRESS/PLAN  - aggressive bowel regimen  - advance tube feeds  - possibly remove anterior apical chest tubes  - wean sedation/sedation holiday  - net negative I/O 500-750ml/24 hours  - remove right IJ and brachial arterial line  - wean flolan  - add D5 1/2NS for hypernatremia    PLAN:  Neuro/ pain/ sedation:  # Postoperative acute pain  # Sedation for mechanical ventilation  - Monitor neurological status. Notify the MD for any acute changes in exam.  - Fentanyl gtt for pain. Transition to PO/IV prn pain meds.   - Propofol gtt for sedation.     Pulmonary care:  # Acute on chronic respiratory failure, worsening, s/p bilateral lung transplantation 3/1/2018  # ILD related to dermatomyositis   # Severe pulmonary hypertension (WHO class III)  # Pneumomediastinum, 2/22/2018  # [facility] ECMO decannulated 3/3   - Supplemental oxygen via ventilator to keep saturation above 92 %.  - Flolan, wean as able by 2ppm q2h as able.  - Nitric oxide weaned off 3/1    Cardiovascular:  # Right heart failure  # Right ventricular hypertrophy  # Pulmonary HTN  # Postoperative atrial fibrillation with RVR    - Monitor hemodynamic status.   - Wean pressors as able  - PRN metoprolol for rate control    GI care:  #Constipation   - NPO except ice chips and medications.  - NJ placed  - Advance TFs, check OG residuals  - Aggressive bowel regimen    Fluids/ Electrolytes/ Nutrition:  #Hypernatremia   - Periodic fluid bolus for IV fluid hydration  - Parenteral nutrition to start with placement of  NG/NJ tube.    Renal/ Fluid Balance:  #Acute kidney injury  - Urine output is adequate so far.  - Cr improving  - Diurese with lasix for net negative goal 500-750mL negative  - Will continue to monitor intake and output.    Endocrine:  #Stress hyperglycemia    - Insulin gtt     ID/ Antibiotics:  #Febrile  - Anti-rejection and immunosuppressant medications per transplant team  - Donor lungs with MRSA, coverage for 2 weeks with vanco/zosyn continued with increased fever  - Unasyn added for culture stopped after one dose to continue broad culture  - Pan culture 3/4 with increased fever    Heme:  #Acute blood loss anemia     - Hemoglobin 8.1 <- 9.2. Monitor, transfuse if Hgb < 7.     Prophylaxis:    - Mechanical prophylaxis for DVT.   - Heparin SubQ BID watching for acute signs of bleeding  - PPI    Lines/ tubes/ drains:  - LIJ MAC/swan, femoral arterial line, PIV, ETT, Basilio, Chest tubes    Disposition:  - CVICU.     Seen with Dr. Grant.    Trino Álvarez MD  General Surgery PGY-3  Pager [phone]    ====================================    TODAY'S PROGRESS:   SUBJECTIVE:   - NAEO  - Afib with RVR this AM responded well to 5 metop and 5 cardizem  - unable to wean flolan yesterday due to high PAP    OBJECTIVE:   1. VITAL SIGNS:   Temp:  [99.7  F (37.6  C)-101.5  F (38.6  C)] 101.3  F (38.5  C)  Heart Rate:  [] 103  Resp:  [16-20] 20  MAP:  [58 mmHg-102 mmHg] 77 mmHg  Arterial Line BP: ()/(49-82) 100/66  FiO2 (%):  [40 %-50 %] 50 %  SpO2:  [93 %-100 %] 100 %  Ventilation Mode: CMV/AC  (Continuous Mandatory Ventilation/ Assist Control)  FiO2 (%): 50 %  Rate Set (breaths/minute): 16 breaths/min  Tidal Volume Set (mL): 380 mL  PEEP (cm H2O): 8 cmH2O  Oxygen Concentration (%): 50 %  Resp: 20    2. INTAKE/ OUTPUT:   I/O last 3 completed shifts:  In: 2164.73 [I.V.:1329.73; NG/GT:545]  Out: 2890 [Urine:1875; Emesis/NG output:325; Chest Tube:690]    3. PHYSICAL EXAMINATION:   General: Sedated, NAD  Neuro: Sedated,  NAD.   Resp: Intubated, mechanically vented  CV: afib, tachycardic  Abdomen: Soft, mildly distended  Incisions: c/d/i  Extremities: warm and well perfused    4. INVESTIGATIONS:   Arterial Blood Gases     Recent Labs  Lab 03/05/18  1324 03/05/18  0842 03/05/18  0355 03/04/18  2355   PH 7.45 7.43 7.42 7.42   PCO2 29* 32* 33* 33*   PO2 164* 172* 170* 201*   HCO3 20* 21 22 21     Complete Blood Count     Recent Labs  Lab 03/05/18  1132 03/05/18  0842 03/05/18  0355 03/04/18  2355   WBC 23.8* 18.9* 18.5* 18.7*   HGB 9.1* 8.2* 8.1* 8.3*    144* 129* 150     Basic Metabolic Panel    Recent Labs  Lab 03/05/18  1132 03/05/18  0842 03/05/18  0355 03/04/18  2355   * 150* 151* 150*   POTASSIUM 4.4 4.2 4.0 4.1   CHLORIDE 118* 117* 120* 119*   CO2 18* 22 21 23   BUN 70* 70* 69* 73*   CR 1.79* 1.80* 1.83* 1.95*   * 146* 130* 169*     Liver Function Tests    Recent Labs  Lab 03/05/18  1132 03/05/18  0842 03/05/18  0355 03/04/18  2355  03/04/18  0353  03/03/18  0329  03/02/18  0330  03/01/18 2017 03/01/18  0356   AST  --   --   --   --   --   --   --  34  --   --   --  38  --  58*   ALT  --  40  --   --   --  40  --  29  --  38  --  65*  --  172*   ALKPHOS  --   --   --   --   --   --   --  27*  --   --   --  35*  --  37*   BILITOTAL  --   --   --   --   --   --   --  1.5*  --   --   --  1.2  --  0.7   ALBUMIN  --   --  2.1*  --   --  2.5*  --  2.4*  --  2.9*  --  1.7*  --  2.4*   INR 1.13 1.13 1.15* 1.15*  < > 1.12  < > 1.36*  < > 1.51*  < > 1.86*  < > 1.51*   < > = values in this interval not displayed.  Pancreatic Enzymes  No lab results found in last 7 days.  Coagulation Profile    Recent Labs  Lab 03/05/18  1132 03/05/18  0842 03/05/18  0355 03/04/18  2355   INR 1.13 1.13 1.15* 1.15*   PTT 27 25 26 26     Lactate  Invalid input(s): LACTATE    5. RADIOLOGY:   Recent Results (from the past 24 hour(s))   XR Chest Port 1 View    Narrative    Exam:  Chest radiograph, 3/5/2018 1:09 AM    History: Postop lung  transplant candidate, lung disease, interstitial    Comparison:  3/4/2018    Findings:  Single portable AP view of the chest. Endotracheal tube in  the mid thoracic trachea. Right [place]-Supriya catheter tip projects over  the main pulmonary artery. Right IJ central venous catheter tip  projects over the low SVC. Enteric tubes course below the level of  diaphragm with tip and sidehole of nasogastric tube over stomach and  feeding tube tip and sidehole outside field of view. Stable bilateral  chest tubes/mediastinal drain.  Partially visualized peripheral IV  catheter in upper right arm.  Median sternotomy.    The cardiomediastinal silhouette is stable. Pulmonary vasculature is  indistinct. No pleural effusion or pneumothorax. No significant change  in left perihilar opacities.      Impression    Impression:    1. Stable support devices.  2. Unchanged left perihilar opacities which may represent pulmonary  edema versus infection.  3. Pulmonary vascular congestion.    I have personally reviewed the examination and initial interpretation  and I agree with the findings.    KEYLA DACOSTA MD       =========================================

## 2018-03-05 NOTE — PLAN OF CARE
Problem: Patient Care Overview  Goal: Plan of Care/Patient Progress Review  Intermittent ectopy see smart disclosure. Continued infusions. SVO2 55-65, CI 1.8-2.6, SVR 6462-8302, Q4hr ROSA and labs. Inhaled flolan 20, 50%Fi02. Urine output 25-75cc/hr. Febrile TMAX 38.5C currently 37.6C cultures 3/4/18 days, sputum sent in evening.  Daughter and  bedside and updated. Patient will continue to be monitored and assessed. Please see MAR, flow sheets, and remainder of chart for further details.

## 2018-03-05 NOTE — PROGRESS NOTES
Pulmonary Medicine  Cystic Fibrosis - Lung Transplant Daily Progress Note   March 5, 2018    Patient: eKcia Blue  MRN: [id number]  Admission date: 2/21/2018  Hospital Day # 12          Assessment and Plan:   Kecia Blue is a 58 y/o F w/PMHx of dermatomyositis (on immunosuppression), seronegative RA, ILD, Pulm HTN, who was admitted for emergent lung transplant on 2/21/18 for increased SOB and hypoxia. On 2/27, she was placed on V-A ECMO for R heart failure. She is s/p bilateral lung transplant on 3/1/18. Currently maintained on [facility]-ECMO. She was on three pressors after surgery, now on two, and was also maintained on flolan.     TODAY'S PROGRESS/PLAN:  Pulm Recs:  -Restart bactrim 400 80 mg for PCP prophylaxis  -Cont current tacrolimus rate  -Cont Zosyn and vanco    PROBLEM LIST:  1. S/p BSLT   2. Right Heart Failure  3. Cardiogenic Shock  4. Acute Kidney Injury  5. Acute Respiratory Failure s/p Oral ETT-Mechanical Ventilation  6. Volume overload with intravascular depletion  7. Electrolyte abnormalities - hypernatremia, hyperkalemia  8. Mild-moderate Protein Calorie Malnutrition  9. Cultures (+) MRSA and Moraxella catarrhalis      ##S/P bilateral lung transplant on 3/1/18, for acute hypoxic/hypercapneic respiratory failure 2/2 ILD  ##S/P Intubation  ##S/P VA-ECMO, decannulated on 3/3  Patient is currently on two pressors (previously on NE; at this time, she is on epi and vasopressin), oxygenating well. Previously on inhaled NO.    --Plan is to wean as tolerated from flolan, and pressors per CVICU and CVTS.   --Aggressive pulmonary toilet with chest physiotherapy QID (acapella/incentive spirometry once extubated)   --Ventilator management per ICU team.  Our team will perform bronchoscopy on day of anticipated extubation.   --Anesthesia to place paravertebral blocks prior to anticipated extubation per our routine   --chest tubes managed by surgery   --Explant pathology pending     Continue 3-drug  immunosuppression:  Goal tacrolimus level 9-11.    - currently on 15 mcg/hr IV tacrolimus, checking levels every MWF  - cont Mycophenolate 1500 mg BID  - Methylprednisolone 125 mg x 3 doses, then taper per lung transplant protocol. Then begin then prednisone 12.9 mg BID with taper per lung transplant protocol.   Continue 3-Drug Immunosuppression:  Tacrolimus: current rate at 15 mcg/hr with TAC goal  9 - 11. Will continue to check TAC level on daily basis and dose adjust accordingly.           Date Tacro Goal  Tacro Level Tacro  Dose   3/2/18 10 - 15 19.4      3/3/18 Changed 9 - 11 15.6 Rate decreased to 15 mcg/hr                     Mycophenolate 1500 mg bid PGT bid (wbc = 18.2)  Prednisone: 12.9 mg bid per Tube to begin today on 03/03/2018    Prophylaxis: 400-80 mg Bactrim for PJP prophylaxis.  Valganciclovir to start POD#8 for CMV prophylaxis.  See below for patient/donor serologies.    Donor Recipient Intervention   CMV status  Pos   Pos  Valgancyclovir POD#8   EBV status  Neg Pos Immune    HSV status Neg  Pos Immune      Primary graft dysfunction:  See chart below:    POD#1   (0-24 hours) POD#2   (25-48 hours) POD#3   (49-72 hours)   Date  3/2/18  3/3/18 3/4/18    Time  1232 1537  1205   Intubated? Y Y Y   PaO2 330   205  147   FiO2 60  60   45   P/F Ratio 550   341  326   PGD Grade   (0=mild, 3=severe) 0  0  0   ECMO? Y Y N   Inhaled NO? N N N   ISHLT PGD Scoring  Grade 0 - PaO2/FiO2 >300 and normal chest radiograph (must be extubated to be Grade 0)  Grade 1 - PaO2/FiO2 >300 and diffuse allograft infiltrates on chest radiograph  Grade 2 - PaO2/FiO2 between 200 and 300  Grade 3 - PaO2/FiO2 <200)     ###ID     Abx:   --cont vancomycin for three days while waiting for culture.   -- Will continue zosyn till cultures finalized (based on bronchial culture of donor done at OSH, see below) and adjust accordingly. Will give a total of 10 - 14day course.    ##Persistent fevers and leukocytosis.  Pt has been febrile since  decannulation from [facility] ECMO on 3/03. It is possible that the ECMO was hiding fevers (given cooling of blood running through machine).  Pt has been covered on IV vanc and zosyn and has negative blood cultures. Donor lung fluid bronchial aerobic wash is growing MRSA that is sensitive to vancomycin. We will CTM and culture appropriately. At this time, no recommendation in changing antimicrobials.     Cultures:  Recipient:  Blood Cx x2: NGTD from 2/21, x2 NGTD from 2/28   Nasal Swab: influenza A & B and RSV PCR neg   Resp Virus Panel: pan neg  Sputum Gram Stain Endotracheal: from 3/1, > 25 PMNs per LPF, mixed gram pos and neg bacteria  Endotracheal Sputum Cx: from 3/1, light growth of normal alo    Donor:   Lung Fluid, Bronchial Cx Aerobic Bact: from 3/1, MRSA, sensitive to vancomycin  Lung Fluid, Bronchial Gram Stain: from 3/1, < 25 PMNs, no organisms  Lung Fluid, Bronchial Fungal Cx: from 3/1, NGTD  Lung Fluid, AFB Cx x2: from 3/1, neg for AFB    Bronchial culture from Donor at OSH:   1) Light growth S aureus  2) Light growth Strep Group G  3) Scant growth S pneumoniae  4) Scant growth Haemophilus  5) + moraxella catarrhalis  6) + beta lactamase positive (rsistant to amox/amp)    The above organisms were also present in bronchial washing.    Bronchial washing gram stain: mod WBCs, rare gram + cocc      Other problems managed by primary team in CV ICU.    Pt was seen and discussed with Dr. Christensen.      Alessandra Lombardi MD  [facility]  Pulmonary Medicine  Pager [phone]  Please see Epic sticky note or Amcom for team contact information  After 6pm weekdays, or all day on weekends: pager [phone]       Subjective & Interval History:     Pt is sedated. S/P bilateral lung transplant on 3/1/18.     Last 24 hours of care team notes reviewed.             Review of Systems:     Unable to obtain, given pt is sedated.           Medications:     Active Medications:    naloxone  3 mg Oral TID     polyethylene glycol  17  g Oral or Feeding Tube BID     sennosides  5 mL Oral or Feeding Tube BID     heparin  5,000 Units Subcutaneous Q8H     diltiazem         [START ON 3/6/2018] sulfamethoxazole-trimethoprim  10 mL Oral or NG Tube Once per day on Tue Thu Sat    Or     [START ON 3/6/2018] sulfamethoxazole-trimethoprim  1 tablet Oral or NG Tube Once per day on Tue Thu Sat     piperacillin-tazobactam  3.375 g Intravenous Q6H     sildenafil  10 mg Oral or Feeding Tube Q8H     vancomycin place jordan - receiving intermittent dosing  1 each Does not apply See Admin Instructions     albuterol  6 puff Inhalation Q4H     multivitamins with minerals  15 mL Per Feeding Tube Daily     protein modular  1 packet Per Feeding Tube BID     nystatin  1,000,000 Units Swish & Swallow 4x Daily     pantoprazole (PROTONIX) IV  40 mg Intravenous Daily     mycophenolate  1,500 mg Oral BID IS    Or     mycophenolate  1,500 mg Oral or NG Tube BID IS     prednisoLONE  0.25 mg/kg (Dosing Weight) Oral or NG Tube BID     docusate  100 mg Oral or Feeding Tube BID     artificial tears   Both Eyes Q6H       Active PRN Medications:  metoprolol, IV fluid REPLACEMENT ONLY, Reason beta blocker order not selected, naloxone, acetaminophen, oxyCODONE IR, ondansetron **OR** ondansetron, IV fluid REPLACEMENT ONLY, glucose **OR** dextrose **OR** glucagon, heparin, propofol (DIPRIVAN) infusion **AND** propofol **AND** CK total **AND** Triglycerides, potassium chloride, potassium chloride, potassium chloride, potassium chloride with lidocaine, potassium chloride, magnesium sulfate, magnesium sulfate         Physical Exam:     Constitutional: sedated, in no apparent distress.   HEENT: Intubated  PULM: Coarse breath sounds on anterior chest. Pt is breathing synchronously on vent.   CV: Normal S1 and S2. RRR.  No murmur, gallop, or rub.  No peripheral edema.    ABD: NABS, soft, nontender, nondistended.  No guarding.   MSK: No apparent muscle wasting. Has cold extremities. Mottling is  present on feet. Cap refill less than 5 seconds on all four extremitites.   NEURO: Sedated; pt responds to stronger touch by furrowing eyebrows.   SKIN: Cold, dry. No rash on limited exam.   PSYCH: Sedated-unable to assess.    Lines, Drains, and Devices:  Peripheral IV 02/21/18 Left;Lateral Upper forearm (Active)   Site Assessment WDL 3/2/2018  8:00 AM   Line Status Infusing 3/2/2018  8:00 AM   Phlebitis Scale 0-->no symptoms 3/2/2018  8:00 AM   Infiltration Scale 0 3/2/2018  8:00 AM   Infiltration Site Treatment Method  None 2/24/2018  4:00 AM   Extravasation? No 3/2/2018  8:00 AM   IV Site Rotation Due Date 02/25/18 3/2/2018  4:00 AM   Reason Not Rotated Poor venous access 3/2/2018  4:00 AM   Number of days:9       Arterial Line 02/27/18 Femoral (Active)   Site Assessment WDL 3/2/2018  8:00 AM   Line Status Pulsatile blood flow 3/2/2018  8:00 AM   Art Line Waveform Appropriate;Square wave test performed 3/2/2018  8:00 AM   Art Line Interventions Leveled;Connections checked and tightened;Flushed per protocol 3/2/2018  8:00 AM   Color/Movement/Sensation Capillary refill greater than 3 sec 3/2/2018  8:00 AM   Dressing Type Transparent 3/2/2018  8:00 AM   Dressing Status Clean, dry, intact 3/2/2018  8:00 AM   Dressing Change Due 03/04/18 3/2/2018  4:00 AM   Number of days:3       Arterial Line 02/28/18 Radial (Active)   Site Assessment WDL 3/2/2018  8:00 AM   Line Status Pulsatile blood flow 3/2/2018  8:00 AM   Art Line Waveform Dampened 3/2/2018  8:00 AM   Art Line Interventions Leveled;Connections checked and tightened;Flushed per protocol 3/2/2018  8:00 AM   Color/Movement/Sensation Capillary refill greater than 3 sec 3/2/2018  8:00 AM   Dressing Type Transparent 3/2/2018  8:00 AM   Dressing Status Clean, dry, intact 3/2/2018  8:00 AM   Dressing Intervention Dressing changed/new dressing 2/28/2018  4:00 PM   Dressing Change Due 03/04/18 3/2/2018  4:00 AM   Number of days:2       CVC Double Lumen 03/01/18 (Active)  "  Site Assessment WDL 3/2/2018  8:00 AM   Extravasation? No 3/2/2018  8:00 AM   Dressing Intervention Chlorhexidine sponge;Transparent 3/2/2018  4:00 AM   Dressing Change Due 03/04/18 3/2/2018  4:00 AM   CVC Lumen Assessment White;Brown;Blue 3/2/2018  4:00 AM   Number of days:1       CVC Triple Lumen 02/27/18 Right Internal jugular (Active)   Site Assessment WDL 3/2/2018  8:00 AM   Extravasation? No 3/2/2018  8:00 AM   Dressing Intervention Chlorhexidine sponge;Transparent 3/2/2018  4:00 AM   Dressing Change Due 03/04/18 3/2/2018  4:00 AM   CVC Comment cdi 2/28/2018  4:00 AM   CVC Lumen Assessment White;Brown;Blue 3/2/2018  4:00 AM   Number of days:3       Arterial Sheath  (Active)   Specific Qualities Sutured 3/2/2018  8:00 AM   Site Assessment Bleeding 3/2/2018  8:00 AM   Dressing Type Transparent 3/2/2018  8:00 AM   Dressing Intervention Dressing reinforced 3/2/2018  4:00 AM   Arterial Sheath Comment art ecmo 3/2/2018  8:00 AM   Number of days:3       Venous Sheath (Active)   Specific Qualities Sutured 3/2/2018  8:00 AM   Site Assessment WDL 3/2/2018  8:00 AM   Dressing Type Transparent 3/2/2018  8:00 AM   Dressing Intervention Dressing reinforced 3/2/2018  4:00 AM   Venous Sheath Comment prachi ecmo 3/2/2018  8:00 AM   Number of days:3            Data:     All vital signs, laboratory and imaging data for the past 24 hours reviewed.      Vital signs:  Temp: 101.3  F (38.5  C) Temp src: Pulmonary Artery     Heart Rate: 103 Resp: 20 SpO2: 100 % O2 Device: Mechanical Ventilator Oxygen Delivery:  (per perfusion/RT ECMO changed to 90 percent) Height: 160 cm (5' 3\") Weight: 61 kg (134 lb 7.7 oz)    Weight trend:   Vitals:    03/03/18 0000 03/04/18 0000 03/05/18 0400   Weight: 57.6 kg (126 lb 15.8 oz) 58.1 kg (128 lb 1.4 oz) 61 kg (134 lb 7.7 oz)        I/O:   Intake/Output Summary (Last 24 hours) at 03/02/18 1315  Last data filed at 03/02/18 1300   Gross per 24 hour   Intake         28658.58 ml   Output             4635 " ml   Net          6436.58 ml       Labs    CMP:   Recent Labs  Lab 03/05/18  1132 03/05/18  0842 03/05/18  0355 03/04/18  2355 03/04/18 1953 03/04/18 0353 03/03/18 1951 03/03/18  0329  03/02/18  0330 03/01/18 2017 03/01/18  0356   * 150* 151* 150* 150*  < > 150*  < > 150*  < > 147*  < > 148* 150*  < > 143   POTASSIUM 4.4 4.2 4.0 4.1 4.2  < > 4.2  < > 4.1  < > 4.2  < > 3.8 3.4  < > 4.3   CHLORIDE 118* 117* 120* 119* 119*  < > 117*  < > 116*  < > 117*  < > 117* 119*  < > 110*   CO2 18* 22 21 23 22  < > 20  < > 22  < > 20  < > 22 24  < > 25   ANIONGAP 12 11 10 9 9  < > 13  < > 11  < > 10  < > 9 7  < > 8   * 146* 130* 169* 111*  < > 133*  < > 105*  < > 168*  < > 156* 232*  < > 125*   BUN 70* 70* 69* 73* 71*  < > 69*  < > 63*  < > 53*  < > 37* 30  < > 37*   CR 1.79* 1.80* 1.83* 1.95* 2.13*  < > 2.14*  < > 2.11*  < > 1.80*  < > 1.23* 0.95  < > 1.25*   GFRESTIMATED 29* 29* 29* 27* 24*  < > 24*  < > 24*  < > 29*  < > 45* 61  < > 44*   GFRESTBLACK 36* 35* 35* 32* 29*  < > 29*  < > 29*  < > 35*  < > 55* 74  < > 54*   CHELSEY 7.5* 7.8* 7.4* 7.4* 7.2*  < > 7.8*  < > 7.8*  < > 7.1*  < > 7.0* 6.8*  < > 7.8*   MAG  --   --  2.5*  --  2.5*  --  2.8*  --  2.7*  < > 2.1  < > 2.2 1.9  --  2.9*   PHOS  --   --  4.0  --   --   --  5.0*  --   --   --  4.4  --  4.0 3.9  --  3.9   PROTTOTAL  --   --   --   --   --   --   --   --   --   --  4.0*  --   --  3.7*  --  5.6*   ALBUMIN  --   --  2.1*  --   --   --  2.5*  --   --   --  2.4*  --  2.9* 1.7*  --  2.4*   BILITOTAL  --   --   --   --   --   --   --   --   --   --  1.5*  --   --  1.2  --  0.7   ALKPHOS  --   --   --   --   --   --   --   --   --   --  27*  --   --  35*  --  37*   AST  --   --   --   --   --   --   --   --   --   --  34  --   --  38  --  58*   ALT  --  40  --   --   --   --  40  --   --   --  29  --  38 65*  --  172*   < > = values in this interval not displayed.  CBC:     Recent Labs  Lab 03/05/18  1132 03/05/18  0842 03/05/18  2845 03/04/18  9067   WBC  23.8* 18.9* 18.5* 18.7*   RBC 2.98* 2.72* 2.67* 2.74*   HGB 9.1* 8.2* 8.1* 8.3*   HCT 27.8* 25.1* 24.4* 24.9*   MCV 93 92 91 91   MCH 30.5 30.1 30.3 30.3   MCHC 32.7 32.7 33.2 33.3   RDW 16.6* 16.6* 16.3* 16.6*    144* 129* 150     INR:     Recent Labs  Lab 03/05/18  1132 03/05/18  0842 03/05/18  0355 03/04/18  2355   INR 1.13 1.13 1.15* 1.15*     Glucose:   Recent Labs  Lab 03/05/18  1322 03/05/18  1155 03/05/18  1132 03/05/18  1130 03/05/18  0842 03/05/18  0630 03/05/18  0355 03/05/18  0352 03/05/18  0220  03/04/18  2355  03/04/18 1953 03/04/18  1603   GLC  --   --  243*  --  146*  --  130*  --   --   --  169*  --  111*  --  148*   * 249*  --  237*  --  97  --  126* 139*  < >  --   < >  --   < >  --    < > = values in this interval not displayed.  Blood Gas:   Recent Labs  Lab 03/05/18  1324 03/05/18  1141 03/05/18  0843  03/03/18  1956 03/01/18  0357  02/28/18  1639   PHV 7.40 7.39 7.39  < > 7.43  < >  --   --   --    PCO2V 37* 36* 35*  < > 36*  < >  --   --   --    PO2V 28 27 31  < > 33  < >  --   --   --    HCO3V 23 22 21  < > 24  < >  --   --   --    LASHAUN  --   --   --   --  0.0  --  2.1  --  1.1   O2PER 50  50 50.0 50.0  < > 50.0  < >  --   < >  --    < > = values in this interval not displayed.  Culture Data     Recent Labs  Lab 03/04/18  2057 03/04/18  1417 03/04/18  1411 03/03/18  0314 03/02/18  0524 03/01/18  1627 03/01/18  1618 03/01/18  0527 03/01/18  0356 02/28/18  0631   CULT Culture negative monitoring continues No growth after 22 hours No growth after 22 hours No growth after 2 days No growth after 3 days Culture negative after 4 days  Culture received and in progress.  Positive AFB results are called as soon as detected.  Final report to follow in 7 to 8 weeks.  Assayed at [facility] [address] [place] [id number] [phone]  No growth  Canceled, Test credited  Test reordered as correct code Culture negative after 4 days  Light growthMethicillin resistant  Staphylococcus aureus (MRSA)*  Critical Value/Significant Value called to and read back byJELANI HASSAN RN (4E).  03.03.18 8158 GJS  Canceled, Test credited  Test reordered as correct code  PENDING Light growthNormal alo No growth after 4 days No growth after 5 days     Virology Data:   Lab Results   Component Value Date    FLUAH1 Negative 02/27/2018    FLUAH3 Negative 02/27/2018    OV1205 Negative 02/27/2018    IFLUB Negative 02/27/2018    RSVA Negative 02/27/2018    RSVB Negative 02/27/2018    PIV1 Negative 02/27/2018    PIV2 Negative 02/27/2018    PIV3 Negative 02/27/2018    HMPV Negative 02/27/2018    HRVS Negative 02/27/2018    ADVBE Negative 02/27/2018    ADVC Negative 02/27/2018     Urine Studies    Recent Labs   Lab Test  03/04/18   1425   URINEPH  5.5   NITRITE  Negative   LEUKEST  Negative   WBCU  5     Most Recent [facility] Pulmonary Function Testing (FVC/FEV1 only)  FVC-Pre   Date Value Ref Range Status   01/05/2018 1.17 L      FVC-%Pred-Pre   Date Value Ref Range Status   01/05/2018 35 %      FEV1-Pre   Date Value Ref Range Status   01/05/2018 0.92 L      FEV1-%Pred-Pre   Date Value Ref Range Status   01/05/2018 35 %        Physician Attestation   I, Tarik Mr Christensen, saw this patient with Dr. Lombardi and agree with the findings and plan of care as documented in the above note.      I personally reviewed vital signs, medications, labs and imaging.    Tarik Mr Christensen  Date of Service (when I saw the patient): 03/05/18

## 2018-03-05 NOTE — PLAN OF CARE
Problem: Patient Care Overview  Goal: Plan of Care/Patient Progress Review  4E:  OT/RI:  Discharge Planner OT   Patient plan for discharge: none stated  Current status: Pt intubated and lightly sedated, minimal command following.  PROM provided for pt on B UE and LE, with tightness noted in B hands.  Pt Max A of 2 for repositioning at end of session.  Barriers to return to prior living situation: medical status  Recommendations for discharge: Pt will likely need rehab at discharge to increase I with ADLs, functional mobility and transfers.       Entered by: Kecia Chin 03/05/2018 1:18 PM

## 2018-03-05 NOTE — PROGRESS NOTES
CLINICAL NUTRITION SERVICES - brief note. See 2/28 note for full assessment.    -FEN/GI: Nutren 1.5 @ 10 mL/hr since 3/2, not advanced over the weekend per team. No BM since 2/26, on bowel regimen.    Interventions  Collaboration with other providers - Discussed w/ team, OK w/ advancing 10 mL q8h to goal  Enteral Nutrition - Adjusted goal rate to 40 mL/hr per previous recommendations       RD will continue to follow.    Natalia Sargent RD, LD, [facility]  CVICU Dietitian  Pager: [phone]

## 2018-03-06 ENCOUNTER — RESULTS ONLY (OUTPATIENT)
Dept: GASTROENTEROLOGY | Facility: CLINIC | Age: 58
End: 2018-03-06

## 2018-03-06 ENCOUNTER — APPOINTMENT (OUTPATIENT)
Dept: GENERAL RADIOLOGY | Facility: CLINIC | Age: 58
DRG: 003 | End: 2018-03-06
Attending: THORACIC SURGERY (CARDIOTHORACIC VASCULAR SURGERY)
Payer: COMMERCIAL

## 2018-03-06 LAB
ACID FAST STN SPEC QL: NORMAL
ALBUMIN SERPL-MCNC: 2.1 G/DL (ref 3.4–5)
ALT SERPL W P-5'-P-CCNC: 42 U/L (ref 0–50)
ANION GAP SERPL CALCULATED.3IONS-SCNC: 11 MMOL/L (ref 3–14)
AT III ACT/NOR PPP CHRO: 111 % (ref 85–135)
BACTERIA SPEC CULT: NO GROWTH
BACTERIA SPEC CULT: NO GROWTH
BACTERIA SPEC CULT: NORMAL
BASE DEFICIT BLDA-SCNC: 0.3 MMOL/L
BASE DEFICIT BLDA-SCNC: 0.3 MMOL/L
BASE DEFICIT BLDA-SCNC: 1.6 MMOL/L
BASE DEFICIT BLDA-SCNC: 1.7 MMOL/L
BASE DEFICIT BLDA-SCNC: 3.4 MMOL/L
BASE DEFICIT BLDA-SCNC: 3.6 MMOL/L
BASE DEFICIT BLDA-SCNC: NORMAL MMOL/L
BASE DEFICIT BLDV-SCNC: 1 MMOL/L
BASE DEFICIT BLDV-SCNC: 1 MMOL/L
BASE DEFICIT BLDV-SCNC: 1.5 MMOL/L
BASE DEFICIT BLDV-SCNC: 2.1 MMOL/L
BASE DEFICIT BLDV-SCNC: 5.1 MMOL/L
BASE EXCESS BLDA CALC-SCNC: 0.9 MMOL/L
BASE EXCESS BLDA CALC-SCNC: NORMAL MMOL/L
BASE EXCESS BLDV CALC-SCNC: 0.7 MMOL/L
BASOPHILS # BLD AUTO: 0 10E9/L (ref 0–0.2)
BASOPHILS NFR BLD AUTO: 0 %
BRONCHOSCOPY: NORMAL
BUN SERPL-MCNC: 70 MG/DL (ref 7–30)
CALCIUM SERPL-MCNC: 7.4 MG/DL (ref 8.5–10.1)
CHLORIDE SERPL-SCNC: 120 MMOL/L (ref 94–109)
CO2 SERPL-SCNC: 20 MMOL/L (ref 20–32)
CREAT SERPL-MCNC: 1.65 MG/DL (ref 0.52–1.04)
D DIMER PPP FEU-MCNC: 1.5 UG/ML FEU (ref 0–0.5)
D DIMER PPP FEU-MCNC: 1.9 UG/ML FEU (ref 0–0.5)
DIFFERENTIAL METHOD BLD: ABNORMAL
DONOR IDENTIFICATION: NORMAL
DSA COMMENTS: NORMAL
DSA PRESENT: NO
DSA TEST METHOD: NORMAL
EOSINOPHIL # BLD AUTO: 0 10E9/L (ref 0–0.7)
EOSINOPHIL NFR BLD AUTO: 0.2 %
ERYTHROCYTE [DISTWIDTH] IN BLOOD BY AUTOMATED COUNT: 16.5 % (ref 10–15)
GFR SERPL CREATININE-BSD FRML MDRD: 32 ML/MIN/1.7M2
GLUCOSE BLDC GLUCOMTR-MCNC: 109 MG/DL (ref 70–99)
GLUCOSE BLDC GLUCOMTR-MCNC: 123 MG/DL (ref 70–99)
GLUCOSE BLDC GLUCOMTR-MCNC: 130 MG/DL (ref 70–99)
GLUCOSE BLDC GLUCOMTR-MCNC: 137 MG/DL (ref 70–99)
GLUCOSE BLDC GLUCOMTR-MCNC: 139 MG/DL (ref 70–99)
GLUCOSE BLDC GLUCOMTR-MCNC: 146 MG/DL (ref 70–99)
GLUCOSE BLDC GLUCOMTR-MCNC: 148 MG/DL (ref 70–99)
GLUCOSE BLDC GLUCOMTR-MCNC: 152 MG/DL (ref 70–99)
GLUCOSE BLDC GLUCOMTR-MCNC: 155 MG/DL (ref 70–99)
GLUCOSE BLDC GLUCOMTR-MCNC: 170 MG/DL (ref 70–99)
GLUCOSE BLDC GLUCOMTR-MCNC: 179 MG/DL (ref 70–99)
GLUCOSE BLDC GLUCOMTR-MCNC: 191 MG/DL (ref 70–99)
GLUCOSE BLDC GLUCOMTR-MCNC: 193 MG/DL (ref 70–99)
GLUCOSE BLDC GLUCOMTR-MCNC: 74 MG/DL (ref 70–99)
GLUCOSE BLDC GLUCOMTR-MCNC: 87 MG/DL (ref 70–99)
GLUCOSE SERPL-MCNC: 138 MG/DL (ref 70–99)
GRAM STN SPEC: NORMAL
GRAM STN SPEC: NORMAL
HCO3 BLD-SCNC: 20 MMOL/L (ref 21–28)
HCO3 BLD-SCNC: 21 MMOL/L (ref 21–28)
HCO3 BLD-SCNC: 22 MMOL/L (ref 21–28)
HCO3 BLD-SCNC: 23 MMOL/L (ref 21–28)
HCO3 BLD-SCNC: 24 MMOL/L (ref 21–28)
HCO3 BLD-SCNC: 24 MMOL/L (ref 21–28)
HCO3 BLD-SCNC: 25 MMOL/L (ref 21–28)
HCO3 BLD-SCNC: NORMAL MMOL/L (ref 21–28)
HCO3 BLDV-SCNC: 20 MMOL/L (ref 21–28)
HCO3 BLDV-SCNC: 23 MMOL/L (ref 21–28)
HCO3 BLDV-SCNC: 24 MMOL/L (ref 21–28)
HCO3 BLDV-SCNC: 26 MMOL/L (ref 21–28)
HCT VFR BLD AUTO: 25.4 % (ref 35–47)
HGB BLD-MCNC: 8.2 G/DL (ref 11.7–15.7)
HGB FREE PLAS-MCNC: <30 MG/DL
IMM GRANULOCYTES # BLD: 0.1 10E9/L (ref 0–0.4)
IMM GRANULOCYTES NFR BLD: 0.8 %
INTERPRETATION ECG - MUSE: NORMAL
LYMPHOCYTES # BLD AUTO: 0.9 10E9/L (ref 0.8–5.3)
LYMPHOCYTES NFR BLD AUTO: 5.1 %
Lab: NORMAL
Lab: NORMAL
MAGNESIUM SERPL-MCNC: 1.8 MG/DL (ref 1.6–2.3)
MAGNESIUM SERPL-MCNC: 2.2 MG/DL (ref 1.6–2.3)
MAGNESIUM SERPL-MCNC: 2.3 MG/DL (ref 1.6–2.3)
MCH RBC QN AUTO: 30.3 PG (ref 26.5–33)
MCHC RBC AUTO-ENTMCNC: 32.3 G/DL (ref 31.5–36.5)
MCV RBC AUTO: 94 FL (ref 78–100)
MONOCYTES # BLD AUTO: 0.7 10E9/L (ref 0–1.3)
MONOCYTES NFR BLD AUTO: 3.8 %
MRSA DNA SPEC QL NAA+PROBE: ABNORMAL
MYCOBACTERIUM SPEC CULT: NORMAL
NEUTROPHILS # BLD AUTO: 16.5 10E9/L (ref 1.6–8.3)
NEUTROPHILS NFR BLD AUTO: 90.1 %
NRBC # BLD AUTO: 0.1 10*3/UL
NRBC BLD AUTO-RTO: 0 /100
O2/TOTAL GAS SETTING VFR VENT: 40 %
O2/TOTAL GAS SETTING VFR VENT: 50 %
O2/TOTAL GAS SETTING VFR VENT: ABNORMAL %
O2/TOTAL GAS SETTING VFR VENT: NORMAL %
O2/TOTAL GAS SETTING VFR VENT: NORMAL %
ORGAN: NORMAL
OXYHGB MFR BLDV: 58 %
OXYHGB MFR BLDV: 60 %
OXYHGB MFR BLDV: 62 %
OXYHGB MFR BLDV: 62 %
OXYHGB MFR BLDV: 63 %
OXYHGB MFR BLDV: 64 %
PCO2 BLD: 31 MM HG (ref 35–45)
PCO2 BLD: 33 MM HG (ref 35–45)
PCO2 BLD: 34 MM HG (ref 35–45)
PCO2 BLD: 36 MM HG (ref 35–45)
PCO2 BLD: NORMAL MM HG (ref 35–45)
PCO2 BLDV: 36 MM HG (ref 40–50)
PCO2 BLDV: 38 MM HG (ref 40–50)
PCO2 BLDV: 40 MM HG (ref 40–50)
PCO2 BLDV: 41 MM HG (ref 40–50)
PCO2 BLDV: 41 MM HG (ref 40–50)
PCO2 BLDV: 45 MM HG (ref 40–50)
PH BLD: 7.41 PH (ref 7.35–7.45)
PH BLD: 7.41 PH (ref 7.35–7.45)
PH BLD: 7.42 PH (ref 7.35–7.45)
PH BLD: 7.43 PH (ref 7.35–7.45)
PH BLD: 7.45 PH (ref 7.35–7.45)
PH BLD: NORMAL PH (ref 7.35–7.45)
PH BLDV: 7.36 PH (ref 7.32–7.43)
PH BLDV: 7.37 PH (ref 7.32–7.43)
PH BLDV: 7.37 PH (ref 7.32–7.43)
PH BLDV: 7.38 PH (ref 7.32–7.43)
PHOSPHATE SERPL-MCNC: 2.3 MG/DL (ref 2.5–4.5)
PHOSPHATE SERPL-MCNC: 3.3 MG/DL (ref 2.5–4.5)
PLATELET # BLD AUTO: 141 10E9/L (ref 150–450)
PO2 BLD: 142 MM HG (ref 80–105)
PO2 BLD: 147 MM HG (ref 80–105)
PO2 BLD: 156 MM HG (ref 80–105)
PO2 BLD: 159 MM HG (ref 80–105)
PO2 BLD: 174 MM HG (ref 80–105)
PO2 BLD: 215 MM HG (ref 80–105)
PO2 BLD: 95 MM HG (ref 80–105)
PO2 BLD: NORMAL MM HG (ref 80–105)
PO2 BLDV: 32 MM HG (ref 25–47)
PO2 BLDV: 33 MM HG (ref 25–47)
PO2 BLDV: 34 MM HG (ref 25–47)
PO2 BLDV: 34 MM HG (ref 25–47)
POTASSIUM SERPL-SCNC: 3.6 MMOL/L (ref 3.4–5.3)
POTASSIUM SERPL-SCNC: 3.8 MMOL/L (ref 3.4–5.3)
POTASSIUM SERPL-SCNC: 4.1 MMOL/L (ref 3.4–5.3)
POTASSIUM SERPL-SCNC: 4.1 MMOL/L (ref 3.4–5.3)
POTASSIUM SERPL-SCNC: 4.2 MMOL/L (ref 3.4–5.3)
POTASSIUM SERPL-SCNC: 4.3 MMOL/L (ref 3.4–5.3)
RBC # BLD AUTO: 2.71 10E12/L (ref 3.8–5.2)
SA1 CELL: NORMAL
SA1 COMMENTS: NORMAL
SA1 HI RISK ABY: NORMAL
SA1 MOD RISK ABY: NORMAL
SA1 TEST METHOD: NORMAL
SA2 CELL: NORMAL
SA2 COMMENTS: NORMAL
SA2 HI RISK ABY UA: NORMAL
SA2 MOD RISK ABY: NORMAL
SA2 TEST METHOD: NORMAL
SODIUM SERPL-SCNC: 151 MMOL/L (ref 133–144)
SPECIMEN SOURCE: ABNORMAL
SPECIMEN SOURCE: NORMAL
TACROLIMUS BLD-MCNC: 4 UG/L (ref 5–15)
TME LAST DOSE: ABNORMAL H
VANCOMYCIN SERPL-MCNC: 16 MG/L
WBC # BLD AUTO: 18.3 10E9/L (ref 4–11)

## 2018-03-06 PROCEDURE — 84460 ALANINE AMINO (ALT) (SGPT): CPT | Performed by: THORACIC SURGERY (CARDIOTHORACIC VASCULAR SURGERY)

## 2018-03-06 PROCEDURE — 83051 HEMOGLOBIN PLASMA: CPT | Performed by: THORACIC SURGERY (CARDIOTHORACIC VASCULAR SURGERY)

## 2018-03-06 PROCEDURE — 93005 ELECTROCARDIOGRAM TRACING: CPT

## 2018-03-06 PROCEDURE — 93010 ELECTROCARDIOGRAM REPORT: CPT | Mod: 76 | Performed by: INTERNAL MEDICINE

## 2018-03-06 PROCEDURE — 25000132 ZZH RX MED GY IP 250 OP 250 PS 637: Performed by: PHYSICIAN ASSISTANT

## 2018-03-06 PROCEDURE — 27210429 ZZH NUTRITION PRODUCT INTERMEDIATE LITER

## 2018-03-06 PROCEDURE — 85300 ANTITHROMBIN III ACTIVITY: CPT | Performed by: THORACIC SURGERY (CARDIOTHORACIC VASCULAR SURGERY)

## 2018-03-06 PROCEDURE — 80202 ASSAY OF VANCOMYCIN: CPT | Performed by: SURGERY

## 2018-03-06 PROCEDURE — 94645 CONT INHLJ TX EACH ADDL HOUR: CPT

## 2018-03-06 PROCEDURE — 87205 SMEAR GRAM STAIN: CPT | Performed by: INTERNAL MEDICINE

## 2018-03-06 PROCEDURE — 40000275 ZZH STATISTIC RCP TIME EA 10 MIN

## 2018-03-06 PROCEDURE — 25000128 H RX IP 250 OP 636: Performed by: ANESTHESIOLOGY

## 2018-03-06 PROCEDURE — 94003 VENT MGMT INPAT SUBQ DAY: CPT

## 2018-03-06 PROCEDURE — 25000132 ZZH RX MED GY IP 250 OP 250 PS 637: Performed by: STUDENT IN AN ORGANIZED HEALTH CARE EDUCATION/TRAINING PROGRAM

## 2018-03-06 PROCEDURE — 40000196 ZZH STATISTIC RAPCV CVP MONITORING

## 2018-03-06 PROCEDURE — 84132 ASSAY OF SERUM POTASSIUM: CPT | Performed by: STUDENT IN AN ORGANIZED HEALTH CARE EDUCATION/TRAINING PROGRAM

## 2018-03-06 PROCEDURE — 82805 BLOOD GASES W/O2 SATURATION: CPT | Performed by: SURGERY

## 2018-03-06 PROCEDURE — 0B9F8ZZ DRAINAGE OF RIGHT LOWER LUNG LOBE, VIA NATURAL OR ARTIFICIAL OPENING ENDOSCOPIC: ICD-10-PCS | Performed by: INTERNAL MEDICINE

## 2018-03-06 PROCEDURE — 80197 ASSAY OF TACROLIMUS: CPT | Performed by: THORACIC SURGERY (CARDIOTHORACIC VASCULAR SURGERY)

## 2018-03-06 PROCEDURE — 0B9J8ZZ DRAINAGE OF LEFT LOWER LUNG LOBE, VIA NATURAL OR ARTIFICIAL OPENING ENDOSCOPIC: ICD-10-PCS | Performed by: INTERNAL MEDICINE

## 2018-03-06 PROCEDURE — 0B9C8ZZ DRAINAGE OF RIGHT UPPER LUNG LOBE, VIA NATURAL OR ARTIFICIAL OPENING ENDOSCOPIC: ICD-10-PCS | Performed by: INTERNAL MEDICINE

## 2018-03-06 PROCEDURE — 85025 COMPLETE CBC W/AUTO DIFF WBC: CPT | Performed by: THORACIC SURGERY (CARDIOTHORACIC VASCULAR SURGERY)

## 2018-03-06 PROCEDURE — 40000048 ZZH STATISTIC DAILY SWAN MONITORING

## 2018-03-06 PROCEDURE — 25000132 ZZH RX MED GY IP 250 OP 250 PS 637: Performed by: SURGERY

## 2018-03-06 PROCEDURE — 31622 DX BRONCHOSCOPE/WASH: CPT

## 2018-03-06 PROCEDURE — 82803 BLOOD GASES ANY COMBINATION: CPT | Performed by: THORACIC SURGERY (CARDIOTHORACIC VASCULAR SURGERY)

## 2018-03-06 PROCEDURE — 25000125 ZZHC RX 250: Performed by: THORACIC SURGERY (CARDIOTHORACIC VASCULAR SURGERY)

## 2018-03-06 PROCEDURE — 85379 FIBRIN DEGRADATION QUANT: CPT | Performed by: THORACIC SURGERY (CARDIOTHORACIC VASCULAR SURGERY)

## 2018-03-06 PROCEDURE — 25000128 H RX IP 250 OP 636: Performed by: STUDENT IN AN ORGANIZED HEALTH CARE EDUCATION/TRAINING PROGRAM

## 2018-03-06 PROCEDURE — 87102 FUNGUS ISOLATION CULTURE: CPT | Performed by: INTERNAL MEDICINE

## 2018-03-06 PROCEDURE — 83735 ASSAY OF MAGNESIUM: CPT | Performed by: THORACIC SURGERY (CARDIOTHORACIC VASCULAR SURGERY)

## 2018-03-06 PROCEDURE — 25000132 ZZH RX MED GY IP 250 OP 250 PS 637: Performed by: THORACIC SURGERY (CARDIOTHORACIC VASCULAR SURGERY)

## 2018-03-06 PROCEDURE — 71045 X-RAY EXAM CHEST 1 VIEW: CPT

## 2018-03-06 PROCEDURE — 0BJ08ZZ INSPECTION OF TRACHEOBRONCHIAL TREE, VIA NATURAL OR ARTIFICIAL OPENING ENDOSCOPIC: ICD-10-PCS | Performed by: INTERNAL MEDICINE

## 2018-03-06 PROCEDURE — 25000131 ZZH RX MED GY IP 250 OP 636 PS 637: Performed by: THORACIC SURGERY (CARDIOTHORACIC VASCULAR SURGERY)

## 2018-03-06 PROCEDURE — 25000128 H RX IP 250 OP 636: Performed by: THORACIC SURGERY (CARDIOTHORACIC VASCULAR SURGERY)

## 2018-03-06 PROCEDURE — 25000125 ZZHC RX 250: Performed by: STUDENT IN AN ORGANIZED HEALTH CARE EDUCATION/TRAINING PROGRAM

## 2018-03-06 PROCEDURE — 25000132 ZZH RX MED GY IP 250 OP 250 PS 637: Performed by: ANESTHESIOLOGY

## 2018-03-06 PROCEDURE — 83735 ASSAY OF MAGNESIUM: CPT | Performed by: STUDENT IN AN ORGANIZED HEALTH CARE EDUCATION/TRAINING PROGRAM

## 2018-03-06 PROCEDURE — 82803 BLOOD GASES ANY COMBINATION: CPT | Performed by: SURGERY

## 2018-03-06 PROCEDURE — 84100 ASSAY OF PHOSPHORUS: CPT | Performed by: STUDENT IN AN ORGANIZED HEALTH CARE EDUCATION/TRAINING PROGRAM

## 2018-03-06 PROCEDURE — 80069 RENAL FUNCTION PANEL: CPT | Performed by: THORACIC SURGERY (CARDIOTHORACIC VASCULAR SURGERY)

## 2018-03-06 PROCEDURE — 99291 CRITICAL CARE FIRST HOUR: CPT | Mod: GC | Performed by: ANESTHESIOLOGY

## 2018-03-06 PROCEDURE — 25000128 H RX IP 250 OP 636: Performed by: SURGERY

## 2018-03-06 PROCEDURE — 20000004 ZZH R&B ICU UMMC

## 2018-03-06 PROCEDURE — 00000146 ZZHCL STATISTIC GLUCOSE BY METER IP

## 2018-03-06 PROCEDURE — 87070 CULTURE OTHR SPECIMN AEROBIC: CPT | Performed by: INTERNAL MEDICINE

## 2018-03-06 PROCEDURE — 40000014 ZZH STATISTIC ARTERIAL MONITORING DAILY

## 2018-03-06 PROCEDURE — 94640 AIRWAY INHALATION TREATMENT: CPT | Mod: 76

## 2018-03-06 RX ORDER — POLYETHYLENE GLYCOL 3350 17 G/17G
17 POWDER, FOR SOLUTION ORAL DAILY
Status: DISCONTINUED | OUTPATIENT
Start: 2018-03-07 | End: 2018-03-06

## 2018-03-06 RX ORDER — FUROSEMIDE 10 MG/ML
40 INJECTION INTRAMUSCULAR; INTRAVENOUS
Status: COMPLETED | OUTPATIENT
Start: 2018-03-06 | End: 2018-03-06

## 2018-03-06 RX ORDER — DEXTROSE MONOHYDRATE 50 MG/ML
INJECTION, SOLUTION INTRAVENOUS CONTINUOUS
Status: DISCONTINUED | OUTPATIENT
Start: 2018-03-06 | End: 2018-03-08

## 2018-03-06 RX ORDER — VANCOMYCIN HYDROCHLORIDE 1 G/200ML
1000 INJECTION, SOLUTION INTRAVENOUS EVERY 24 HOURS
Status: DISCONTINUED | OUTPATIENT
Start: 2018-03-07 | End: 2018-03-10

## 2018-03-06 RX ORDER — VALGANCICLOVIR HYDROCHLORIDE 50 MG/ML
450 POWDER, FOR SOLUTION ORAL
Status: DISCONTINUED | OUTPATIENT
Start: 2018-03-09 | End: 2018-03-09

## 2018-03-06 RX ORDER — QUETIAPINE FUMARATE 50 MG/1
50 TABLET, FILM COATED ORAL AT BEDTIME
Status: DISCONTINUED | OUTPATIENT
Start: 2018-03-06 | End: 2018-03-24 | Stop reason: HOSPADM

## 2018-03-06 RX ORDER — PIPERACILLIN SODIUM, TAZOBACTAM SODIUM 3; .375 G/15ML; G/15ML
3.38 INJECTION, POWDER, LYOPHILIZED, FOR SOLUTION INTRAVENOUS EVERY 6 HOURS
Status: DISCONTINUED | OUTPATIENT
Start: 2018-03-06 | End: 2018-03-06

## 2018-03-06 RX ADMIN — METOPROLOL TARTRATE 12.5 MG: 25 TABLET, FILM COATED ORAL at 20:11

## 2018-03-06 RX ADMIN — METOPROLOL TARTRATE 12.5 MG: 25 TABLET, FILM COATED ORAL at 15:34

## 2018-03-06 RX ADMIN — HEPARIN SODIUM 5000 UNITS: 5000 INJECTION, SOLUTION INTRAVENOUS; SUBCUTANEOUS at 01:54

## 2018-03-06 RX ADMIN — DEXTROSE MONOHYDRATE 3.38 G: 5 INJECTION, SOLUTION INTRAVENOUS at 17:15

## 2018-03-06 RX ADMIN — SILDENAFIL CITRATE 10 MG: 10 POWDER, FOR SUSPENSION ORAL at 01:33

## 2018-03-06 RX ADMIN — PREDNISOLONE SODIUM PHOSPHATE 12.9 MG: 15 SOLUTION ORAL at 08:31

## 2018-03-06 RX ADMIN — MINERAL OIL AND WHITE PETROLATUM: 150; 830 OINTMENT OPHTHALMIC at 05:46

## 2018-03-06 RX ADMIN — DEXTROSE MONOHYDRATE 3.38 G: 5 INJECTION, SOLUTION INTRAVENOUS at 09:32

## 2018-03-06 RX ADMIN — LEVALBUTEROL TARTRATE 2 PUFF: 45 AEROSOL, METERED ORAL at 12:51

## 2018-03-06 RX ADMIN — DEXTROSE MONOHYDRATE 3.38 G: 5 INJECTION, SOLUTION INTRAVENOUS at 22:20

## 2018-03-06 RX ADMIN — DEXTROSE MONOHYDRATE: 50 INJECTION, SOLUTION INTRAVENOUS at 09:16

## 2018-03-06 RX ADMIN — NYSTATIN 1000000 UNITS: 500000 SUSPENSION ORAL at 15:34

## 2018-03-06 RX ADMIN — MULTIVITAMIN 15 ML: LIQUID ORAL at 08:30

## 2018-03-06 RX ADMIN — QUETIAPINE 50 MG: 50 TABLET ORAL at 22:38

## 2018-03-06 RX ADMIN — POLYETHYLENE GLYCOL 3350 17 G: 17 POWDER, FOR SOLUTION ORAL at 08:30

## 2018-03-06 RX ADMIN — POTASSIUM CHLORIDE 20 MEQ: 1.5 POWDER, FOR SOLUTION ORAL at 15:34

## 2018-03-06 RX ADMIN — POTASSIUM CHLORIDE 20 MEQ: 1.5 POWDER, FOR SOLUTION ORAL at 10:41

## 2018-03-06 RX ADMIN — LEVALBUTEROL TARTRATE 2 PUFF: 45 AEROSOL, METERED ORAL at 16:53

## 2018-03-06 RX ADMIN — METOPROLOL TARTRATE 5 MG: 5 INJECTION INTRAVENOUS at 13:03

## 2018-03-06 RX ADMIN — HEPARIN SODIUM 5000 UNITS: 5000 INJECTION, SOLUTION INTRAVENOUS; SUBCUTANEOUS at 17:07

## 2018-03-06 RX ADMIN — SILDENAFIL CITRATE 10 MG: 10 POWDER, FOR SUSPENSION ORAL at 17:04

## 2018-03-06 RX ADMIN — METOPROLOL TARTRATE 12.5 MG: 25 TABLET, FILM COATED ORAL at 08:46

## 2018-03-06 RX ADMIN — FUROSEMIDE 40 MG: 10 INJECTION, SOLUTION INTRAVENOUS at 15:34

## 2018-03-06 RX ADMIN — OXYCODONE HYDROCHLORIDE 10 MG: 5 TABLET ORAL at 21:39

## 2018-03-06 RX ADMIN — Medication 1 PACKET: at 08:30

## 2018-03-06 RX ADMIN — PREDNISOLONE SODIUM PHOSPHATE 12.9 MG: 15 SOLUTION ORAL at 20:11

## 2018-03-06 RX ADMIN — PIPERACILLIN AND TAZOBACTAM 3.38 G: 3; .375 INJECTION, POWDER, LYOPHILIZED, FOR SOLUTION INTRAVENOUS; PARENTERAL at 04:04

## 2018-03-06 RX ADMIN — Medication 5 ML: at 08:30

## 2018-03-06 RX ADMIN — Medication 1 PACKET: at 21:17

## 2018-03-06 RX ADMIN — METOPROLOL TARTRATE 5 MG: 5 INJECTION INTRAVENOUS at 23:10

## 2018-03-06 RX ADMIN — METOPROLOL TARTRATE 5 MG: 5 INJECTION INTRAVENOUS at 23:05

## 2018-03-06 RX ADMIN — Medication 3 MG: at 08:30

## 2018-03-06 RX ADMIN — NYSTATIN 1000000 UNITS: 500000 SUSPENSION ORAL at 20:11

## 2018-03-06 RX ADMIN — DOCUSATE SODIUM 100 MG: 50 LIQUID ORAL at 08:31

## 2018-03-06 RX ADMIN — LEVALBUTEROL TARTRATE 2 PUFF: 45 AEROSOL, METERED ORAL at 04:19

## 2018-03-06 RX ADMIN — SULFAMETHOXAZOLE AND TRIMETHOPRIM 80 MG: 200; 40 SUSPENSION ORAL at 08:30

## 2018-03-06 RX ADMIN — HUMAN INSULIN 3 UNITS/HR: 100 INJECTION, SOLUTION SUBCUTANEOUS at 15:12

## 2018-03-06 RX ADMIN — FUROSEMIDE 40 MG: 10 INJECTION, SOLUTION INTRAVENOUS at 08:48

## 2018-03-06 RX ADMIN — MYCOPHENOLATE MOFETIL 1500 MG: 200 POWDER, FOR SUSPENSION ORAL at 08:30

## 2018-03-06 RX ADMIN — NYSTATIN 1000000 UNITS: 500000 SUSPENSION ORAL at 08:31

## 2018-03-06 RX ADMIN — SILDENAFIL CITRATE 10 MG: 10 POWDER, FOR SUSPENSION ORAL at 09:22

## 2018-03-06 RX ADMIN — MYCOPHENOLATE MOFETIL 1500 MG: 200 POWDER, FOR SUSPENSION ORAL at 18:01

## 2018-03-06 RX ADMIN — HEPARIN SODIUM 5000 UNITS: 5000 INJECTION, SOLUTION INTRAVENOUS; SUBCUTANEOUS at 08:46

## 2018-03-06 RX ADMIN — HUMAN INSULIN 4 UNITS/HR: 100 INJECTION, SOLUTION SUBCUTANEOUS at 09:37

## 2018-03-06 RX ADMIN — LEVALBUTEROL TARTRATE 2 PUFF: 45 AEROSOL, METERED ORAL at 00:17

## 2018-03-06 RX ADMIN — NYSTATIN 1000000 UNITS: 500000 SUSPENSION ORAL at 14:01

## 2018-03-06 RX ADMIN — PROPOFOL 15 MCG/KG/MIN: 10 INJECTION, EMULSION INTRAVENOUS at 12:16

## 2018-03-06 RX ADMIN — OXYCODONE HYDROCHLORIDE 10 MG: 5 TABLET ORAL at 01:32

## 2018-03-06 NOTE — PLAN OF CARE
Problem: Patient Care Overview  Goal: Plan of Care/Patient Progress Review  Outcome: Improving  Able to wean Flolan to 14ng today, will continue to wean per orders as pt tolerates. FiO2 also weaned to 40%. 101.3 tmax. Attempted to wean epi but SVO2 decreased to 48; restarted gtt, 0.03mcg/kg/min. Diuresed this morning x1 dose lasix. PA 31/15, CVP up to 18. CI >2.0 most of the day. SVR 1227. Around 1000 this morning pt developed A fib with RVR; Cardizem and x2 doses metoprolol given; rates seen up to 190 bpm with MAPs tolerating. Pain regimen adjusted; fentanyl gtt weaned; changed to dilaudid; po oxy also used as adjunct. No results from suppository or bowel regimen, bowel sounds are active. TF advanced; at 30/hr; goal rate will be met by early tomorrow morning. CT removed by provider; only two pleural CT remain. Daughter and  at bedside and updated.

## 2018-03-06 NOTE — PROGRESS NOTES
[facility], [place]   Palliative Care Daily Progress Note          Palliative Care was consulted for decisional support and goals of care. At this time goals are clear and there is minimal symptom burden, so we will sign off. Please feel free to reconsult us if we can be helpful in the future. Thank you for the opportunity to be involved in the care of this patient.    Robin Bray  Palliative Care Consult Team  Pager: [phone]

## 2018-03-06 NOTE — PLAN OF CARE
Problem: Patient Care Overview  Goal: Plan of Care/Patient Progress Review  Outcome: Improving  Pt sedated, on propofol and dilauded for pain/sedation. Following commands. Oxy x 1 for pain. SR/ST with frequent PAC's, and occasional PVC's. Epi & vaso off. PA: 28-30/14, CVP: 10-13, CI: > 2, CO: 3-4's, SVR: 1354-1114, SVO2: 58-62. CT output 30-50 q 2 hrs. Brachial A-line dc'd per order. Flolan weaned to 8 ng/kg/min. Medium loose BM. TF advanced to 40. UOP: 30-75/hr. Will continue to monitor and notify team with concerns.

## 2018-03-06 NOTE — PROGRESS NOTES
Transplant Social Work Services Progress Note      Date of Initial Social Work Evaluation: 2/20/2018  Collaborated with: pulm team    Data: supportive visit with spouse and daughter,Yudelka in ICU waiting room.  They updated me on patient condition.  Have a good understanding of plan how she is doing.  Realistic. But hopeful.    Intervention: supportive counseling.    Assessment: coping well.    Education provided by SW: what to expect LOS, potential need for rehab, etc.     Plan:    Discharge Plans in Progress: none at this time.     Barriers to d/c plan: critically ill.     Follow up Plan: will continue to follow for support, counseling, education and resources.

## 2018-03-06 NOTE — PROGRESS NOTES
Pulmonary Medicine  Cystic Fibrosis - Lung Transplant Daily Progress Note   March 6, 2018       Patient: Kecia Blue  MRN: [id number]  Transplant Date: 2/21/2018 (POD# 5)  Admission date: 2/21/2018  Hospital Day #13          Assessment and Plan:     Kecia Blue is a 56 y/o female w/PMHx significant for dermatomyositis (on immunosuppression), seronegative RA, ILD, and pulmonary hypertension who was admitted for emergent lung transplant w/u on 2/21 for increased SOB and hypoxia. Required V-A ECMO for right heart failure on 2/27. Now s/p bilateral sequential lung transplant on 3/1, ECMO decannulation on 3/3. Post-op course c/b hemodynamic instability requiring pressors and iFlolan. Remains intubated, mildly sedated in the ICU.        # S/p bilateral lung transplant on 3/1/18 for acute hypoxic/hypercapneic respiratory failure 2/2 ILD  Adequate graft function. Weaned off pressors overnight. Remains on iFlolan, low dose. Intubated, oxygenating well on 0.4 FiO2.   - Today's CXR, personally reviewed by me, with unchanged left perihilar opacities representing pulmonary edema versus infection.   - Antibiotics as noted below  - Agree with additional diuretics today for likely pulmonary edema noted on CXR  - Agree with weaning iFlolan to OFF.   - Our team will perform bedside bronchoscopy at 1pm today. If patient remains stable and off iFlolan could consider extubation.  - Continue aggressive pulmonary toilet with chest physiotherapy QID (acapella/incentive spirometry once extubated)  - Chest tube management per CVTS  - Explant pathology pending, continue to follow                                  Continue 3-drug immunosuppression:  Goal tacrolimus target level 9-11 (reduced for RADHA)  - IV tacrolimus @ 15 mcg/hr. Will increase tacrolimus gtt rate to 22 mcg/hr this AM, as yesterday's tacrolimus drug level was subtherapeutic. Continue to monitor drug levels daily while on infusion, adjust accordingly.    - MMF  1500 g BID  - Prednisolone 12.9mg BID     Prophylaxis:   PJP: Bactrim  CMV: will begin vangancyclovir 900mg daily POD #8 (ordered)  Fungal: Nystatin       Donor Recipient Intervention   CMV status  Pos   Pos  Valgancyclovir POD#8   EBV status Pos Pos Immune    HSV status N/A  Pos Immune      # Infectious disease  # SIRS  No known infectious disease history. SIRS likely a response from multiple procedures (VA ECMO cannulation and decannulation) and transplant surgery. Fever curve improving. Negative recipient bronch culture from time of transplant. Pan cultured 3/4 NGTD.   - Donor culture at [facility] growing MRSA -- continue vancomycin   - Donor culture at OSH preliminarily growing MSSA, Strep pneumoniae, Moraxella catarrhalis, Haemophilus species -- personally reviewed [facility] donor culture data. Final sensitivities pending. Continue pip/tazo at this time.      We appreciate the excellent care provided by the ICU team. We will continue to follow along closely, please do not hesitate to call with any questions or concerns.     Patient discussed with Dr. Christensen.    ADRIÁN Amato CNP  Inpatient Nurse Practitioner  [facility]  Pager [phone]    ADDENDUM: tacrolimus drug level 4.0 today. Our team already increased tacro infusion 15 mcg/hr --> 22 mcg/hr this morning. However, with a declining drug level, we will more aggressively increase tacro infusion 22 mcg/hr --> 40 mcg/hr at the time of this note --- discussed w/ Dr. Christensen.  LEO Najera         Subjective & Interval History:     Last 24 hour vital signs, labs and care team notes reviewed.    Weaned off pressors overnight. iFlolan down to 8 ng/kg/min this AM. Remains on full vent support, 0.4 FiO2.            Review of Systems:     Unable to perform ROS as patient remains intubated, sedated.           Medications:     Active Medications:    furosemide  40 mg Intravenous BID     metoprolol tartrate  12.5 mg Oral TID     piperacillin-tazobactam  3.375 g Intravenous Q6H      [START ON 3/7/2018] pantoprazole  40 mg Oral or Feeding Tube Daily     naloxone  3 mg Oral TID     polyethylene glycol  17 g Oral or Feeding Tube BID     sennosides  5 mL Oral or Feeding Tube BID     heparin  5,000 Units Subcutaneous Q8H     levalbuterol  2 puff Inhalation Q4H JULES     sulfamethoxazole-trimethoprim  10 mL Oral or NG Tube Once per day on Tue Thu Sat    Or     sulfamethoxazole-trimethoprim  1 tablet Oral or NG Tube Once per day on Tue Thu Sat     sildenafil  10 mg Oral or Feeding Tube Q8H     vancomycin place jordan - receiving intermittent dosing  1 each Does not apply See Admin Instructions     multivitamins with minerals  15 mL Per Feeding Tube Daily     protein modular  1 packet Per Feeding Tube BID     nystatin  1,000,000 Units Swish & Swallow 4x Daily     mycophenolate  1,500 mg Oral BID IS    Or     mycophenolate  1,500 mg Oral or NG Tube BID IS     prednisoLONE  0.25 mg/kg (Dosing Weight) Oral or NG Tube BID     docusate  100 mg Oral or Feeding Tube BID     artificial tears   Both Eyes Q6H     Active PRN Medications:  metoprolol, IV fluid REPLACEMENT ONLY, Reason beta blocker order not selected, naloxone, acetaminophen, oxyCODONE IR, ondansetron **OR** ondansetron, IV fluid REPLACEMENT ONLY, glucose **OR** dextrose **OR** glucagon, heparin, propofol (DIPRIVAN) infusion **AND** propofol **AND** CK total **AND** Triglycerides, potassium chloride, potassium chloride, potassium chloride, potassium chloride with lidocaine, potassium chloride, magnesium sulfate, magnesium sulfate         Physical Exam:     Constitutional: Intubated, sedated, in no apparent distress.   HEENT: Eyes with pink conjunctivae, no scleral jaundice. Neck supple without lymphadenopathy. Orally intubated.   PULM: Good air flow bilaterally. Clear lung sounds.    CV: Normal S1 and S2. RRR. No murmur, gallop, or rub. + Trace BLE edema. Peripheral pulses intact.   ABD: Hypoactive BS, soft, nontender, nondistended. + Grimacing  with palpation upon exam.    MSK: Moves all extremities. No apparent muscle wasting.   NEURO: Intubated, sedated. GALLOWAY spont. Following simple commands.    SKIN: No pruritus. North appearing right toes, dusky appearing left toes. ?     Lines, Drains, and Devices:  Peripheral IV 02/21/18 Left;Lateral Upper forearm (Active)   Site Assessment WDL 3/6/2018 12:00 PM   Line Status Infusing 3/6/2018 12:00 PM   Phlebitis Scale 0-->no symptoms 3/6/2018 12:00 PM   Infiltration Scale 0 3/6/2018 12:00 PM   Infiltration Site Treatment Method  None 3/5/2018  4:00 AM   Extravasation? No 3/6/2018 12:00 PM   Dressing Intervention New dressing  3/3/2018  4:00 AM   IV Site Rotation Due Date 02/25/18 3/2/2018  4:00 AM   Reason Not Rotated Poor venous access 3/2/2018  4:00 AM   Number of days:13       Arterial Line 02/27/18 Femoral (Active)   Site Assessment WDL 3/6/2018 12:00 PM   Line Status Pulsatile blood flow 3/6/2018 12:00 PM   Art Line Waveform Appropriate 3/6/2018 12:00 PM   Art Line Interventions Leveled 3/6/2018 12:00 PM   Color/Movement/Sensation Capillary refill less than 3 sec 3/6/2018 12:00 PM   Dressing Type Transparent 3/6/2018 12:00 PM   Dressing Status Clean, dry, intact 3/6/2018 12:00 PM   Dressing Intervention Dressing changed/new dressing 3/6/2018  4:00 AM   Dressing Change Due 03/11/18 3/6/2018 12:00 PM   Number of days:7       CVC Double Lumen 03/01/18 (Active)   Site Assessment WDL 3/6/2018 12:00 PM   Extravasation? No 3/6/2018 12:00 PM   Dressing Intervention Transparent;Chlorhexidine sponge 3/6/2018 12:00 PM   Dressing Change Due 03/11/18 3/6/2018 12:00 PM   CVC Comment Cordis 3/5/2018  8:00 AM   CVC Lumen Assessment White;Brown;Blue 3/2/2018  4:00 AM   Number of days:5       Pulmonary Artery Catheter - Single Lumen 03/03/18 1000 Left internal jugular vein continuous hemodynamic catheter (Active)   Dressing dressing dry and intact 3/6/2018  8:00 AM   Securement secured with sutures 3/6/2018  8:00 AM   PA  "Catheter Markings (cm) 51 3/6/2018  8:00 AM   Phlebitis 0-->no symptoms 3/6/2018  8:00 AM   Infiltration 0-->no symptoms 3/6/2018  8:00 AM   Waveform normal 3/6/2018  8:00 AM   Balloon Inflation Volume to Obtain Wedge Tracing (mL) 0 3/4/2018  4:00 AM   Pressure Catheter Interventions blood specimen obtained and sent to lab 3/6/2018  8:00 AM   Daily Review Of Necessity completed 3/6/2018  8:00 AM   Number of days:3            Data:     All vital signs, laboratory and imaging data for the past 24 hours reviewed.      Vital signs:  Temp: 99.3  F (37.4  C) Temp src: Pulmonary Artery BP: 103/87   Heart Rate: 174 Resp: 20 SpO2: 100 % O2 Device: Mechanical Ventilator Oxygen Delivery:  (per perfusion/RT ECMO changed to 90 percent) Height: 160 cm (5' 3\") Weight: 61 kg (134 lb 7.7 oz)    Weight trend:   Vitals:    03/04/18 0000 03/05/18 0400 03/06/18 0430   Weight: 58.1 kg (128 lb 1.4 oz) 61 kg (134 lb 7.7 oz) 61 kg (134 lb 7.7 oz)        I/O:   Intake/Output Summary (Last 24 hours) at 03/06/18 1411  Last data filed at 03/06/18 1400   Gross per 24 hour   Intake          3809.61 ml   Output             3095 ml   Net           714.61 ml       Labs    CMP:   Recent Labs  Lab 03/06/18  0946 03/06/18  0402 03/06/18  0210 03/05/18 2019 03/05/18  1648 03/05/18  1132 03/05/18  0842 03/05/18  0355  03/04/18  0353  03/03/18  0329  03/01/18 2017 03/01/18  0356   NA  --  151*  --   --  150* 148* 150* 151*  < > 150*  < > 147*  < > 150*  < > 143   POTASSIUM 3.6 4.2 4.1 3.9 3.9 4.4 4.2 4.0  < > 4.2  < > 4.2  < > 3.4  < > 4.3   CHLORIDE  --  120*  --   --  117* 118* 117* 120*  < > 117*  < > 117*  < > 119*  < > 110*   CO2  --  20  --   --  23 18* 22 21  < > 20  < > 20  < > 24  < > 25   ANIONGAP  --  11  --   --  10 12 11 10  < > 13  < > 10  < > 7  < > 8   GLC  --  138*  --   --  160* 243* 146* 130*  < > 133*  < > 168*  < > 232*  < > 125*   BUN  --  70*  --   --  74* 70* 70* 69*  < > 69*  < > 53*  < > 30  < > 37*   CR  --  1.65*  --   --  " 1.85* 1.79* 1.80* 1.83*  < > 2.14*  < > 1.80*  < > 0.95  < > 1.25*   GFRESTIMATED  --  32*  --   --  28* 29* 29* 29*  < > 24*  < > 29*  < > 61  < > 44*   GFRESTBLACK  --  39*  --   --  34* 36* 35* 35*  < > 29*  < > 35*  < > 74  < > 54*   CHELSEY  --  7.4*  --   --  7.7* 7.5* 7.8* 7.4*  < > 7.8*  < > 7.1*  < > 6.8*  < > 7.8*   MAG  --  2.3 2.2 2.2  --   --   --  2.5*  < > 2.8*  < > 2.1  < > 1.9  --  2.9*   PHOS  --  3.3  --   --   --   --   --  4.0  --  5.0*  --  4.4  < > 3.9  --  3.9   PROTTOTAL  --   --   --   --   --   --   --   --   --   --   --  4.0*  --  3.7*  --  5.6*   ALBUMIN  --  2.1*  --   --   --   --   --  2.1*  --  2.5*  --  2.4*  < > 1.7*  --  2.4*   BILITOTAL  --   --   --   --   --   --   --   --   --   --   --  1.5*  --  1.2  --  0.7   ALKPHOS  --   --   --   --   --   --   --   --   --   --   --  27*  --  35*  --  37*   AST  --   --   --   --   --   --   --   --   --   --   --  34  --  38  --  58*   ALT  --  42  --   --   --   --  40  --   --  40  --  29  < > 65*  --  172*   < > = values in this interval not displayed.  CBC:   Recent Labs  Lab 03/06/18  0402 03/05/18  1648 03/05/18  1132 03/05/18  0842   WBC 18.3* 20.2* 23.8* 18.9*   RBC 2.71* 2.86* 2.98* 2.72*   HGB 8.2* 8.7* 9.1* 8.2*   HCT 25.4* 26.8* 27.8* 25.1*   MCV 94 94 93 92   MCH 30.3 30.4 30.5 30.1   MCHC 32.3 32.5 32.7 32.7   RDW 16.5* 16.5* 16.6* 16.6*   * 160 174 144*     INR:   Recent Labs  Lab 03/05/18  1648 03/05/18  1132 03/05/18  0842 03/05/18  0355   INR 1.11 1.13 1.13 1.15*     Glucose:   Recent Labs  Lab 03/06/18  1120 03/06/18  0945 03/06/18  0738 03/06/18  0651 03/06/18  0532 03/06/18  0424 03/06/18  0402  03/05/18  1648  03/05/18  1132  03/05/18  0842  03/05/18  0355  03/04/18  2355   GLC  --   --   --   --   --   --  138*  --  160*  --  243*  --  146*  --  130*  --  169*   * 191* 193* 139* 74 148*  --   < >  --   < >  --   < >  --   < >  --   < >  --    < > = values in this interval not displayed.  Blood Gas:    Recent Labs  Lab 03/06/18  1152 03/06/18  0946 03/06/18  0737 03/06/18  0402  03/03/18  1956  03/01/18  0357  02/28/18  1639   PHV 7.36  --  7.38 7.38  < > 7.43  < >  --   --   --    PCO2V 36*  --  38* 41  < > 36*  < >  --   --   --    PO2V 34  --  32 33  < > 33  < >  --   --   --    HCO3V 20*  --  23 24  < > 24  < >  --   --   --    LASHAUN  --   --   --   --   --  0.0  --  2.1  --  1.1   O2PER 50  50 40 40 40  40  < > 50.0  < >  --   < >  --    < > = values in this interval not displayed.  Culture Data   Recent Labs  Lab 03/04/18  2057 03/04/18  1417 03/04/18  1411 03/03/18  0314 03/02/18  0524 03/01/18  1627 03/01/18  1618 03/01/18  0527 03/01/18  0356 02/28/18  0631   CULT No growth No growth after 2 days No growth after 2 days No growth after 3 days No growth after 4 days Culture negative after 4 days  Culture received and in progress.  Positive AFB results are called as soon as detected.  Final report to follow in 7 to 8 weeks.  Assayed at [facility] [place] [address] [place] [phone]  No growth  Canceled, Test credited  Test reordered as correct code Culture negative after 4 days  Light growthMethicillin resistant Staphylococcus aureus (MRSA)*  Critical Value/Significant Value called to and read back byJELANI HASSAN RN ([id number]).  03.03.18 [id number] GJS  Canceled, Test credited  Test reordered as correct code  PENDING Canceled, Test creditedDuplicate request  Light growthNormal alo No growth after 5 days No growth     Virology Data: Lab Results   Component Value Date    FLUAH1 Negative 02/27/2018    FLUAH3 Negative 02/27/2018    SK2113 Negative 02/27/2018    IFLUB Negative 02/27/2018    RSVA Negative 02/27/2018    RSVB Negative 02/27/2018    PIV1 Negative 02/27/2018    PIV2 Negative 02/27/2018    PIV3 Negative 02/27/2018    HMPV Negative 02/27/2018    HRVS Negative 02/27/2018    ADVBE Negative 02/27/2018    ADVC Negative 02/27/2018     Historical CMV results (last 3 of prior  testing):  Lab Results   Component Value Date    CMVQNT Canceled, Test credited (A) 03/01/2018    CMVQNT Canceled, Test credited (A) 03/01/2018     Lab Results   Component Value Date    CMVLOG Canceled, Test credited 03/01/2018    CMVLOG Canceled, Test credited 03/01/2018     Urine Studies  Recent Labs   Lab Test  03/04/18   1425   URINEPH  5.5   NITRITE  Negative   LEUKEST  Negative   WBCU  5

## 2018-03-06 NOTE — PHARMACY-VANCOMYCIN DOSING SERVICE
"Pharmacy Vancomycin Consult Note    Date of Service 2018  Patient's  1960   57 year old, female    Indication: Perioperative Prophylaxis  Goal Trough Level: 15-20 mg/L  Day of Therapy: 6  Current Vancomycin regimen: intermittent dosing    Current estimated CrCl = Estimated Creatinine Clearance: 33.1 mL/min (based on Cr of 1.85).    Creatinine for last 3 days  3/3/2018:  3:29 AM Creatinine 1.80 mg/dL; 12:04 PM Creatinine 1.83 mg/dL;  2:48 PM Creatinine 1.95 mg/dL;  4:02 PM Creatinine 1.95 mg/dL;  7:51 PM Creatinine 2.11 mg/dL; 11:49 PM Creatinine 2.12 mg/dL  3/4/2018:  3:53 AM Creatinine 2.14 mg/dL;  8:15 AM Creatinine 2.09 mg/dL; 12:05 PM Creatinine 2.06 mg/dL;  4:03 PM Creatinine 2.08 mg/dL;  7:53 PM Creatinine 2.13 mg/dL; 11:55 PM Creatinine 1.95 mg/dL  3/5/2018:  3:55 AM Creatinine 1.83 mg/dL;  8:42 AM Creatinine 1.80 mg/dL; 11:32 AM Creatinine 1.79 mg/dL;  4:48 PM Creatinine 1.85 mg/dL    Recent Vancomycin Levels (past 3 days)  3/3/2018:  2:48 PM Vancomycin Level 20.0 mg/L  3/4/2018:  3:53 AM Vancomycin Level 17.6 mg/L  3/5/2018:  8:19 PM Vancomycin Level 12.1 mg/L    Body mass index is 23.82 kg/(m^2).  Height is 5' 3\".   Wt Readings from Last 2 Encounters:   18 61 kg (134 lb 7.7 oz)   18 51.5 kg (113 lb 9.6 oz)       Vancomycin IV Administrations (past 72 hours)                   vancomycin (VANCOCIN) 750 mg in sodium chloride 0.9 % 250 mL intermittent infusion (mg) 750 mg New Bag 18 0912              Nephrotoxins and other renal medications (Future)    Start     Dose/Rate Route Frequency Ordered Stop    18 2330  vancomycin (VANCOCIN) 1000 mg in dextrose 5% 200 mL PREMIX      1,000 mg  200 mL/hr over 1 Hours Intravenous ONCE 18 2221      18 1500  piperacillin-tazobactam (ZOSYN) 3.375 g vial to attach to  mL bag      3.375 g  over 30 Minutes Intravenous EVERY 6 HOURS 18 1342      18 1345  vasopressin (PITRESSIN) 40 Units in D5W 40 mL " infusion      0-4 Units/hr  0-4 mL/hr  Intravenous CONTINUOUS 03/04/18 1339      03/03/18 1800  tacrolimus (PROGRAF) 5,000 mcg in D5W 250 mL      15 mcg/hr  0.8 mL/hr  Intravenous CONTINUOUS 03/03/18 1748      03/03/18 1002  vancomycin place jordan - receiving intermittent dosing      1 each Does not apply SEE ADMIN INSTRUCTIONS 03/03/18 1003           Contrast Orders - past 72 hours     None        Interpretation of levels and current regimen:  Random level is Subtherapeutic  Has serum creatinine changed > 50% in last 72 hours: No  Urine output: good urine output  Renal Function: Improving    Plan:  1.  Re-dose vancomycin 1000 mg IV x 1 now followed by intermittent dosing based on vancomycin levels. Will redose vancomycin once level confirmed or estimated to be <20 mg/L.  2.  Pharmacy will check trough levels as appropriate in 1-3 Days.    3.  Serum creatinine levels will be ordered daily for the first week of therapy and at least twice weekly for subsequent weeks.      Kortney Hutchinson, PharmD  March 5, 2018              .

## 2018-03-06 NOTE — PROGRESS NOTES
CVICU PROGRESS NOTE  03/06/2018    ASSESSMENT: Kecia Blue is a 57 year old female with PMH significant for dermatomyositis on immunosuppression, eronegative RA, ILD, and pulmonary hypertension, who was admitted for emergent lung transplant work-up on 2/21 for increasing shortness of breath and hypoxia now s/p emergent [facility] ECMO placement on 2/27/18. Now s/p bilateral lung transplant on 3/1 and [facility] ECMO decannulation on 3/3 with Dr. Hernandez.      TODAY'S PROGRESS/PLAN  - Start metoprolol 12.5 TID  - Radial art line (unsuccessful)  - Cont flolan wean  - Bronchoscopy with Pulmonary  - Goal net even    PLAN:  Neuro/ pain/ sedation:  # Postoperative acute pain  # Sedation for mechanical ventilation  - Monitor neurological status. Notify the MD for any acute changes in exam.  - Dilaudid gtt for pain. Transition to PO/IV prn pain meds.   - Propofol gtt for sedation.   - Paravertebral catheters after extubation    Pulmonary care:  # Acute on chronic respiratory failure, worsening, s/p bilateral lung transplantation 3/1/2018  # ILD related to dermatomyositis   # Severe pulmonary hypertension (WHO class III)  # Pneumomediastinum, 2/22/2018  # [facility] ECMO decannulated 3/3   - Supplemental oxygen via ventilator to keep saturation above 92 %.  - Flolan, wean off today  - Nitric oxide weaned off 3/1  - Bronch today  - Extubate likely tomorrow    Cardiovascular:  # Right heart failure  # Right ventricular hypertrophy  # Pulmonary HTN  # Postoperative atrial fibrillation with RVR - sinus  - Monitor hemodynamic status.   - Start Metoprolol 12.5 TID    GI care:  #Constipation - having BMs  - NPO. OG.  - NJ placed  - Bowel regimen    Fluids/ Electrolytes/ Nutrition:  #Hypernatremia   - Advance TFs to goal  - ICU electrolyte protocol  - D5W at 50cc/hr  - FWF at 60cc/2 hours    Renal/ Fluid Balance:  #Acute kidney injury  - Urine output is adequate so far.  - Cr improving  - Diurese with lasix for net negative goal 500-750mL  negative  - Will continue to monitor intake and output.    Endocrine:  #Stress hyperglycemia    - Insulin gtt     ID/ Antibiotics:  #Febrile  - Anti-rejection and immunosuppressant medications per transplant team  - Donor lungs with MRSA, coverage for 2 weeks with vanco/zosyn continued with increased fever  - Unasyn added for culture stopped after one dose to continue broad culture  - Pan culture 3/4 - NGTD  - Follow 3/6 BAL cultures    Heme:  #Acute blood loss anemia     - Hemoglobin stable 8.2.  - Monitor Hgb and Plts, transfuse if Hgb < 7.     Prophylaxis:    - Mechanical prophylaxis for DVT.   - Heparin SubQ TID.  - PPI    Lines/ tubes/ drains:  - LIJ MAC/swan, femoral arterial line, PIV, ETT, Basilio, Chest tubes x2    Disposition:  - CVICU.     Seen with Dr. Grant.    Trino Álvarez MD  General Surgery PGY-3  Pager [phone]    ====================================    TODAY'S PROGRESS:   SUBJECTIVE:   - Able to wean Flolan, almost off  - Off pressors  - Otherwise, no acute events     OBJECTIVE:   1. VITAL SIGNS:   Temp:  [97.9  F (36.6  C)-99.9  F (37.7  C)] 99.3  F (37.4  C)  Heart Rate:  [] 174  Resp:  [16-24] 20  BP: ()/(33-87) 103/87  MAP:  [51 mmHg-104 mmHg] 87 mmHg  Arterial Line BP: ()/(40-93) 112/71  FiO2 (%):  [40 %-50 %] 50 %  SpO2:  [90 %-100 %] 100 %  Ventilation Mode: CMV/AC  (Continuous Mandatory Ventilation/ Assist Control)  FiO2 (%): 50 %  Rate Set (breaths/minute): 16 breaths/min  Tidal Volume Set (mL): 380 mL  PEEP (cm H2O): 8 cmH2O  Oxygen Concentration (%): 40 %  Resp: 20    2. INTAKE/ OUTPUT:   I/O last 3 completed shifts:  In: 3977.61 [I.V.:2237.61; NG/GT:970]  Out: 3095 [Urine:2020; Emesis/NG output:500; Chest Tube:575]    3. PHYSICAL EXAMINATION:   General: Sedated, NAD  Neuro: Sedated, NAD, moves extremities, follows commands  Resp: Intubated, mechanically vented  CV: sinus, normocardic per tele. CTs with s/s drainage.  Abdomen: Soft, mildly distended  Incisions:  c/d/i  Extremities: warm and well perfused, toes dusky bilaterally, cap refill < 2 seconds, +doppler signals bilateral PT    4. INVESTIGATIONS:   Arterial Blood Gases     Recent Labs  Lab 03/06/18  1509 03/06/18  1448 03/06/18  1152 03/06/18  0946   PH 7.41 Canceled, Test credited 7.41 7.42   PCO2 36 Canceled, Test credited 33* 31*   PO2 215* Canceled, Test credited 147* 95   HCO3 23 Canceled, Test credited 21 20*     Complete Blood Count     Recent Labs  Lab 03/06/18  0402 03/05/18  1648 03/05/18  1132 03/05/18  0842   WBC 18.3* 20.2* 23.8* 18.9*   HGB 8.2* 8.7* 9.1* 8.2*   * 160 174 144*     Basic Metabolic Panel    Recent Labs  Lab 03/06/18  1430 03/06/18  0946 03/06/18  0402 03/06/18  0210  03/05/18  1648 03/05/18  1132 03/05/18  0842   NA  --   --  151*  --   --  150* 148* 150*   POTASSIUM 3.8 3.6 4.2 4.1  < > 3.9 4.4 4.2   CHLORIDE  --   --  120*  --   --  117* 118* 117*   CO2  --   --  20  --   --  23 18* 22   BUN  --   --  70*  --   --  74* 70* 70*   CR  --   --  1.65*  --   --  1.85* 1.79* 1.80*   GLC  --   --  138*  --   --  160* 243* 146*   < > = values in this interval not displayed.  Liver Function Tests    Recent Labs  Lab 03/06/18  0402 03/05/18  1648 03/05/18  1132 03/05/18  0842 03/05/18  0355  03/04/18  0353  03/03/18  0329 03/01/18 2017 03/01/18  0356   AST  --   --   --   --   --   --   --   --  34  --  38  --  58*   ALT 42  --   --  40  --   --  40  --  29  < > 65*  --  172*   ALKPHOS  --   --   --   --   --   --   --   --  27*  --  35*  --  37*   BILITOTAL  --   --   --   --   --   --   --   --  1.5*  --  1.2  --  0.7   ALBUMIN 2.1*  --   --   --  2.1*  --  2.5*  --  2.4*  < > 1.7*  --  2.4*   INR  --  1.11 1.13 1.13 1.15*  < > 1.12  < > 1.36*  < > 1.86*  < > 1.51*   < > = values in this interval not displayed.  Pancreatic Enzymes  No lab results found in last 7 days.  Coagulation Profile    Recent Labs  Lab 03/05/18  1648 03/05/18  1132 03/05/18  0842 03/05/18  0355   INR 1.11 1.13  1.13 1.15*   PTT 24 27 25 26     Lactate  Invalid input(s): LACTATE    5. RADIOLOGY:   Recent Results (from the past 24 hour(s))   XR Chest Port 1 View    Narrative    Exam:  Chest X-ray 3/5/2018 7:21 PM    History: s/p chest tube removal;     Comparison: Chest x-ray 3/5/2018 at 1:00 AM    Findings: Portable AP radiograph of the chest at 30 degrees. Interval  removal of the right IJ central venous catheter, and bilateral  apically directed chest tubes and mediastinal drain. Endotracheal tube  tip projects 5.1 cm above the gee. Left IJ [place]-Supriya catheter with  tip projecting over the main pulmonary artery. Enteric tube courses  below the diaphragm, and NG/OG tube tip and sidehole projecting over  the stomach. Bibasilar chest tubes are stable in position.    Postsurgical changes of bilateral lung transplantation. The cardiac  silhouette is stable. The pulmonary vasculature is indistinct. No  pleural effusion or pneumothorax. Significant change in the left  perihilar patchy opacities. The visualized upper abdomen appears  unremarkable.       Impression    Impression:   1. Interval removal of the bilateral apically directed chest tubes,  and the mediastinal drain. Stable position of other lines and tubes.  No pneumothorax.  2. Postsurgical changes of bilateral lung transplantation, with stable  left perihilar opacities that may represent pulmonary edema versus  infection.    I have personally reviewed the examination and initial interpretation  and I agree with the findings.    TALI CARRANZA MD   XR Chest Port 1 View    Narrative    Exam:  Chest radiograph, 3/6/2018 1:03 AM    History: Check endotracheal tube placement    Comparison:  3/5/2018    Findings:  Single AP view of the chest. Postsurgical changes of  bilateral lung transplantation. Endotracheal tube in the mid thoracic  trachea, stable since prior. Enteric tubes course below the level of  diaphragm with feeding tube tip outside field of view and  nasogastric  tube tip and sidehole projecting over the stomach. Stable positioning  of [place]-Supriya catheter tip in the main pulmonary artery. Stable basilar  chest tubes. The cardiomediastinal silhouette is stable. Pulmonary  vasculature is distinct. No pleural effusion or pneumothorax. Stable  left perihilar opacities.       Impression    Impression:    1. Postsurgical changes of bilateral lung transplantation with stable  left perihilar opacities that may represent pulmonary edema versus  infection.  2. Stable support devices.    I have personally reviewed the examination and initial interpretation  and I agree with the findings.    ANNE MANZANO MD       =========================================

## 2018-03-06 NOTE — PROGRESS NOTES
I personally saw, examined and evaluated the patient. I agree with the above documentation, assessment and plan, as outlined in the Resident/Fellow/NP/PA's note.    Assessment: 57 year old female with PMH significant for dermatomyositis on immunosuppression, RA, ILD, and pulmonary hypertension, who was admitted for emergent lung transplant work-up on 2/21 for increasing shortness of breath and hypoxia. S/P emergent VA ECMO placement on 2/27/18. S/P bilateral lung transplant on 3/1.    Critical Care Diagnoses:  1. Ventilator Dependent Respiratory Failure                                                  2. Cardiogenic Shock - resolved                                                  3. Atrial Fibrillation with Rapid Ventricular Response                                                  4. Pulmonary HTN - stable                                                  5. Acute Right Ventricular Heart Failure - resolved                                                  6. Anemia - stable                                                  7. Hypernatremia                                                  8. Constipation -  improving                                                                        9. Concern for Protein-Calorie Malnutrition                                                 10. Acute Renal Failure with Hyperchloremia - improving  Plan:     1. Start metoprolol 12.5mg TID  2. D/C femoral arterial line  3. OOB with mobilization  4. Place radial arterial line  5. Continue to wean inhaled Flolan   6. Plan for extubation this evening or tomorrow  7. Plan for bronchoscopy by Pulmonary Service prior to extubation  8. Plan for bilateral paravertebral catheters for pain control after extubation  9. Increase free water to 60cc Q2H  10. Change Zosyn and Vancomycin to D5W carriers to minimize Na infusions  11. Goal fluid balance is even by tomorrow      Total Critical Care Time: 41 minutes    Dr. Shree Grant M.D.

## 2018-03-06 NOTE — PROGRESS NOTES
"SPIRITUAL HEALTH SERVICES  SPIRITUAL ASSESSMENT Progress Note (Palliative Focus)  [facility] ([place]) 4E    REFERRAL SOURCE: Palliative consult service follow up visit.    Visit with daughter Yudelka of patient Kecia Blue at Kecia's bedside. Yudelka shared the hope that family feels, together with the acknowledgment that Kecia's recovery will be long. Family are taking turns spending time with Kecia and  Ranjan, to save days off from work for when Kecia needs more support from family \"later on\". Family continues to be supportive of one another and Kecia, making meaning from this experience by telling stories of Kecia's character and how her character is \"getting her through\". Family also finding comfort in sense of God's presence with them (especially Ranjan).    Plan: Palliative consult service is signing off. I will consult with unit  regarding follow up.    Heather Gutierrez  Palliative   Pager [phone]  [facility] Inpatient Team Consult pager [phone] (M-F 8-4:30)  After-hours Answering Service [phone]   "

## 2018-03-06 NOTE — PROGRESS NOTES
PATHOLOGY HLA CROSSMATCH CONSULTATION: DONOR/RECIPIENT  VIRTUAL CROSSMATCH - Lung  Consultation Date: 2018  Consultation Requested by: Dr. Toby Hernandez  Regarding: Compatibility of  donor organ UNOS #[id number] from OPO/Hospital: [facility] [place] [facility]  with Kecia Blue    Findings: Regarding a virtual crossmatch between Kecia Blue and  donor listed above (match ID [id number]):  The most recent (18 patient serum was analyzed.  The patient has no antibodies listed with HLA specificity against the donor organ.      Record Review Indicates: I personally reviewed the most recent serum with solid-phase HLA Single Antigen test results:  The patient has no HLA antibodies against the donor organ.     The results of this virtual XM are:   -most recent serum: Compatible     Disclaimer: Clinical judgement must take into account other factors, such as non-HLA antibodies not detected in the assay. The VXM gives probabilities only.  The probability does not account for the potential for auto-antibodies that may be present in the patient's serum.  These autoantibodies may render the physical crossmatch falsely positive, and would be detected by an autologous crossmatch.  When possible, confirm findings with prospective allogeneic and autologous flow crossmatches before going to transplant as clinically indicated.     Kd Montenegro, PhD,[facility]  Medical Director, Immunology/Histocompatibility Laboratory  Pager [phone]

## 2018-03-07 ENCOUNTER — APPOINTMENT (OUTPATIENT)
Dept: SPEECH THERAPY | Facility: CLINIC | Age: 58
DRG: 003 | End: 2018-03-07
Attending: INTERNAL MEDICINE
Payer: COMMERCIAL

## 2018-03-07 ENCOUNTER — APPOINTMENT (OUTPATIENT)
Dept: OCCUPATIONAL THERAPY | Facility: CLINIC | Age: 58
DRG: 003 | End: 2018-03-07
Attending: INTERNAL MEDICINE
Payer: COMMERCIAL

## 2018-03-07 ENCOUNTER — APPOINTMENT (OUTPATIENT)
Dept: ULTRASOUND IMAGING | Facility: CLINIC | Age: 58
DRG: 003 | End: 2018-03-07
Attending: INTERNAL MEDICINE
Payer: COMMERCIAL

## 2018-03-07 ENCOUNTER — APPOINTMENT (OUTPATIENT)
Dept: GENERAL RADIOLOGY | Facility: CLINIC | Age: 58
DRG: 003 | End: 2018-03-07
Attending: THORACIC SURGERY (CARDIOTHORACIC VASCULAR SURGERY)
Payer: COMMERCIAL

## 2018-03-07 LAB
ALBUMIN SERPL-MCNC: 2 G/DL (ref 3.4–5)
ALT SERPL W P-5'-P-CCNC: 39 U/L (ref 0–50)
ANION GAP SERPL CALCULATED.3IONS-SCNC: 10 MMOL/L (ref 3–14)
ANION GAP SERPL CALCULATED.3IONS-SCNC: 9 MMOL/L (ref 3–14)
AT III ACT/NOR PPP CHRO: 112 % (ref 85–135)
BACTERIA SPEC CULT: NO GROWTH
BASE EXCESS BLDA CALC-SCNC: 2.2 MMOL/L
BASE EXCESS BLDV CALC-SCNC: 2.4 MMOL/L
BASE EXCESS BLDV CALC-SCNC: 3.1 MMOL/L
BASOPHILS # BLD AUTO: 0 10E9/L (ref 0–0.2)
BASOPHILS NFR BLD AUTO: 0.1 %
BUN SERPL-MCNC: 57 MG/DL (ref 7–30)
BUN SERPL-MCNC: 63 MG/DL (ref 7–30)
CALCIUM SERPL-MCNC: 7.5 MG/DL (ref 8.5–10.1)
CALCIUM SERPL-MCNC: 7.7 MG/DL (ref 8.5–10.1)
CHLORIDE SERPL-SCNC: 112 MMOL/L (ref 94–109)
CHLORIDE SERPL-SCNC: 114 MMOL/L (ref 94–109)
CO2 SERPL-SCNC: 23 MMOL/L (ref 20–32)
CO2 SERPL-SCNC: 25 MMOL/L (ref 20–32)
CREAT SERPL-MCNC: 1.4 MG/DL (ref 0.52–1.04)
CREAT SERPL-MCNC: 1.5 MG/DL (ref 0.52–1.04)
CROSSMATCH RESULT: NORMAL
CROSSMATCH RESULT: NORMAL
D DIMER PPP FEU-MCNC: 1.7 UG/ML FEU (ref 0–0.5)
D DIMER PPP FEU-MCNC: 2.1 UG/ML FEU (ref 0–0.5)
DIFFERENTIAL METHOD BLD: ABNORMAL
EOSINOPHIL # BLD AUTO: 0.2 10E9/L (ref 0–0.7)
EOSINOPHIL NFR BLD AUTO: 1 %
ERYTHROCYTE [DISTWIDTH] IN BLOOD BY AUTOMATED COUNT: 16.7 % (ref 10–15)
ERYTHROCYTE [DISTWIDTH] IN BLOOD BY AUTOMATED COUNT: 16.9 % (ref 10–15)
GFR SERPL CREATININE-BSD FRML MDRD: 36 ML/MIN/1.7M2
GFR SERPL CREATININE-BSD FRML MDRD: 39 ML/MIN/1.7M2
GLUCOSE BLDC GLUCOMTR-MCNC: 100 MG/DL (ref 70–99)
GLUCOSE BLDC GLUCOMTR-MCNC: 117 MG/DL (ref 70–99)
GLUCOSE BLDC GLUCOMTR-MCNC: 131 MG/DL (ref 70–99)
GLUCOSE BLDC GLUCOMTR-MCNC: 134 MG/DL (ref 70–99)
GLUCOSE BLDC GLUCOMTR-MCNC: 135 MG/DL (ref 70–99)
GLUCOSE BLDC GLUCOMTR-MCNC: 136 MG/DL (ref 70–99)
GLUCOSE BLDC GLUCOMTR-MCNC: 158 MG/DL (ref 70–99)
GLUCOSE BLDC GLUCOMTR-MCNC: 166 MG/DL (ref 70–99)
GLUCOSE BLDC GLUCOMTR-MCNC: 169 MG/DL (ref 70–99)
GLUCOSE BLDC GLUCOMTR-MCNC: 190 MG/DL (ref 70–99)
GLUCOSE BLDC GLUCOMTR-MCNC: 191 MG/DL (ref 70–99)
GLUCOSE BLDC GLUCOMTR-MCNC: 203 MG/DL (ref 70–99)
GLUCOSE BLDC GLUCOMTR-MCNC: 219 MG/DL (ref 70–99)
GLUCOSE BLDC GLUCOMTR-MCNC: 91 MG/DL (ref 70–99)
GLUCOSE BLDC GLUCOMTR-MCNC: 96 MG/DL (ref 70–99)
GLUCOSE SERPL-MCNC: 169 MG/DL (ref 70–99)
GLUCOSE SERPL-MCNC: 208 MG/DL (ref 70–99)
HCO3 BLD-SCNC: 26 MMOL/L (ref 21–28)
HCO3 BLDV-SCNC: 27 MMOL/L (ref 21–28)
HCO3 BLDV-SCNC: 28 MMOL/L (ref 21–28)
HCT VFR BLD AUTO: 24.3 % (ref 35–47)
HCT VFR BLD AUTO: 26 % (ref 35–47)
HGB BLD-MCNC: 7.8 G/DL (ref 11.7–15.7)
HGB BLD-MCNC: 8.4 G/DL (ref 11.7–15.7)
HGB FREE PLAS-MCNC: <30 MG/DL
IGG SERPL-MCNC: 698 MG/DL (ref 695–1620)
IMM GRANULOCYTES # BLD: 0.1 10E9/L (ref 0–0.4)
IMM GRANULOCYTES NFR BLD: 0.7 %
INTERPRETATION ECG - MUSE: NORMAL
INTERPRETATION ECG - MUSE: NORMAL
LMWH PPP CHRO-ACNC: 0.32 IU/ML
LYMPHOCYTES # BLD AUTO: 0.8 10E9/L (ref 0.8–5.3)
LYMPHOCYTES NFR BLD AUTO: 4.6 %
MAGNESIUM SERPL-MCNC: 1.9 MG/DL (ref 1.6–2.3)
MAGNESIUM SERPL-MCNC: 2.5 MG/DL (ref 1.6–2.3)
MAGNESIUM SERPL-MCNC: 2.5 MG/DL (ref 1.6–2.3)
MCH RBC QN AUTO: 31 PG (ref 26.5–33)
MCH RBC QN AUTO: 31.1 PG (ref 26.5–33)
MCHC RBC AUTO-ENTMCNC: 32.1 G/DL (ref 31.5–36.5)
MCHC RBC AUTO-ENTMCNC: 32.3 G/DL (ref 31.5–36.5)
MCV RBC AUTO: 96 FL (ref 78–100)
MCV RBC AUTO: 97 FL (ref 78–100)
MONOCYTES # BLD AUTO: 0.5 10E9/L (ref 0–1.3)
MONOCYTES NFR BLD AUTO: 2.8 %
NEUTROPHILS # BLD AUTO: 16.1 10E9/L (ref 1.6–8.3)
NEUTROPHILS NFR BLD AUTO: 90.8 %
NRBC # BLD AUTO: 0.1 10*3/UL
NRBC BLD AUTO-RTO: 0 /100
O2/TOTAL GAS SETTING VFR VENT: ABNORMAL %
O2/TOTAL GAS SETTING VFR VENT: NORMAL %
O2/TOTAL GAS SETTING VFR VENT: NORMAL %
OXYHGB MFR BLDV: 60 %
OXYHGB MFR BLDV: 62 %
PCO2 BLD: 35 MM HG (ref 35–45)
PCO2 BLDV: 41 MM HG (ref 40–50)
PCO2 BLDV: 42 MM HG (ref 40–50)
PH BLD: 7.47 PH (ref 7.35–7.45)
PH BLDV: 7.42 PH (ref 7.32–7.43)
PH BLDV: 7.43 PH (ref 7.32–7.43)
PHOSPHATE SERPL-MCNC: 2.4 MG/DL (ref 2.5–4.5)
PHOSPHATE SERPL-MCNC: 3 MG/DL (ref 2.5–4.5)
PLATELET # BLD AUTO: 157 10E9/L (ref 150–450)
PLATELET # BLD AUTO: 162 10E9/L (ref 150–450)
PO2 BLD: 113 MM HG (ref 80–105)
PO2 BLDV: 31 MM HG (ref 25–47)
PO2 BLDV: 32 MM HG (ref 25–47)
POTASSIUM SERPL-SCNC: 3.8 MMOL/L (ref 3.4–5.3)
POTASSIUM SERPL-SCNC: 4 MMOL/L (ref 3.4–5.3)
POTASSIUM SERPL-SCNC: 4.1 MMOL/L (ref 3.4–5.3)
POTASSIUM SERPL-SCNC: 4.3 MMOL/L (ref 3.4–5.3)
RADIOLOGIST FLAGS: ABNORMAL
RBC # BLD AUTO: 2.51 10E12/L (ref 3.8–5.2)
RBC # BLD AUTO: 2.71 10E12/L (ref 3.8–5.2)
SODIUM SERPL-SCNC: 146 MMOL/L (ref 133–144)
SODIUM SERPL-SCNC: 146 MMOL/L (ref 133–144)
SPECIMEN SOURCE: NORMAL
TACROLIMUS BLD-MCNC: 4.9 UG/L (ref 5–15)
TME LAST DOSE: ABNORMAL H
WBC # BLD AUTO: 16.7 10E9/L (ref 4–11)
WBC # BLD AUTO: 17.7 10E9/L (ref 4–11)

## 2018-03-07 PROCEDURE — 84132 ASSAY OF SERUM POTASSIUM: CPT | Performed by: THORACIC SURGERY (CARDIOTHORACIC VASCULAR SURGERY)

## 2018-03-07 PROCEDURE — 71045 X-RAY EXAM CHEST 1 VIEW: CPT

## 2018-03-07 PROCEDURE — 94799 UNLISTED PULMONARY SVC/PX: CPT

## 2018-03-07 PROCEDURE — 25000125 ZZHC RX 250: Performed by: SURGERY

## 2018-03-07 PROCEDURE — 25000132 ZZH RX MED GY IP 250 OP 250 PS 637: Performed by: STUDENT IN AN ORGANIZED HEALTH CARE EDUCATION/TRAINING PROGRAM

## 2018-03-07 PROCEDURE — 85025 COMPLETE CBC W/AUTO DIFF WBC: CPT | Performed by: THORACIC SURGERY (CARDIOTHORACIC VASCULAR SURGERY)

## 2018-03-07 PROCEDURE — 82803 BLOOD GASES ANY COMBINATION: CPT | Performed by: THORACIC SURGERY (CARDIOTHORACIC VASCULAR SURGERY)

## 2018-03-07 PROCEDURE — 85520 HEPARIN ASSAY: CPT | Performed by: THORACIC SURGERY (CARDIOTHORACIC VASCULAR SURGERY)

## 2018-03-07 PROCEDURE — 40000196 ZZH STATISTIC RAPCV CVP MONITORING

## 2018-03-07 PROCEDURE — 25000128 H RX IP 250 OP 636: Performed by: SURGERY

## 2018-03-07 PROCEDURE — 25000125 ZZHC RX 250: Performed by: STUDENT IN AN ORGANIZED HEALTH CARE EDUCATION/TRAINING PROGRAM

## 2018-03-07 PROCEDURE — 83735 ASSAY OF MAGNESIUM: CPT | Performed by: PHYSICIAN ASSISTANT

## 2018-03-07 PROCEDURE — 25000132 ZZH RX MED GY IP 250 OP 250 PS 637: Performed by: ANESTHESIOLOGY

## 2018-03-07 PROCEDURE — 25000132 ZZH RX MED GY IP 250 OP 250 PS 637: Performed by: PHYSICIAN ASSISTANT

## 2018-03-07 PROCEDURE — 82805 BLOOD GASES W/O2 SATURATION: CPT | Performed by: SURGERY

## 2018-03-07 PROCEDURE — 40000809 ZZH STATISTIC NO DOCUMENTATION TO SUPPORT CHARGE

## 2018-03-07 PROCEDURE — 92610 EVALUATE SWALLOWING FUNCTION: CPT | Mod: GN

## 2018-03-07 PROCEDURE — 92526 ORAL FUNCTION THERAPY: CPT | Mod: GN

## 2018-03-07 PROCEDURE — 85027 COMPLETE CBC AUTOMATED: CPT | Performed by: PHYSICIAN ASSISTANT

## 2018-03-07 PROCEDURE — 83735 ASSAY OF MAGNESIUM: CPT | Performed by: THORACIC SURGERY (CARDIOTHORACIC VASCULAR SURGERY)

## 2018-03-07 PROCEDURE — 80048 BASIC METABOLIC PNL TOTAL CA: CPT | Performed by: SURGERY

## 2018-03-07 PROCEDURE — 40000048 ZZH STATISTIC DAILY SWAN MONITORING

## 2018-03-07 PROCEDURE — 40000133 ZZH STATISTIC OT WARD VISIT: Performed by: OCCUPATIONAL THERAPIST

## 2018-03-07 PROCEDURE — 99291 CRITICAL CARE FIRST HOUR: CPT | Mod: GC | Performed by: ANESTHESIOLOGY

## 2018-03-07 PROCEDURE — 85379 FIBRIN DEGRADATION QUANT: CPT | Performed by: THORACIC SURGERY (CARDIOTHORACIC VASCULAR SURGERY)

## 2018-03-07 PROCEDURE — 25000132 ZZH RX MED GY IP 250 OP 250 PS 637: Performed by: THORACIC SURGERY (CARDIOTHORACIC VASCULAR SURGERY)

## 2018-03-07 PROCEDURE — 25000128 H RX IP 250 OP 636

## 2018-03-07 PROCEDURE — 83051 HEMOGLOBIN PLASMA: CPT | Performed by: THORACIC SURGERY (CARDIOTHORACIC VASCULAR SURGERY)

## 2018-03-07 PROCEDURE — 93971 EXTREMITY STUDY: CPT | Mod: LT

## 2018-03-07 PROCEDURE — 40000225 ZZH STATISTIC SLP WARD VISIT

## 2018-03-07 PROCEDURE — 40000275 ZZH STATISTIC RCP TIME EA 10 MIN

## 2018-03-07 PROCEDURE — 84460 ALANINE AMINO (ALT) (SGPT): CPT | Performed by: THORACIC SURGERY (CARDIOTHORACIC VASCULAR SURGERY)

## 2018-03-07 PROCEDURE — 25000128 H RX IP 250 OP 636: Performed by: PHYSICIAN ASSISTANT

## 2018-03-07 PROCEDURE — 25000132 ZZH RX MED GY IP 250 OP 250 PS 637: Performed by: SURGERY

## 2018-03-07 PROCEDURE — 40000014 ZZH STATISTIC ARTERIAL MONITORING DAILY

## 2018-03-07 PROCEDURE — 80197 ASSAY OF TACROLIMUS: CPT | Performed by: THORACIC SURGERY (CARDIOTHORACIC VASCULAR SURGERY)

## 2018-03-07 PROCEDURE — 25000128 H RX IP 250 OP 636: Performed by: TRANSPLANT SURGERY

## 2018-03-07 PROCEDURE — 20000004 ZZH R&B ICU UMMC

## 2018-03-07 PROCEDURE — 40000556 ZZH STATISTIC PERIPHERAL IV START W US GUIDANCE

## 2018-03-07 PROCEDURE — 97530 THERAPEUTIC ACTIVITIES: CPT | Mod: GO | Performed by: OCCUPATIONAL THERAPIST

## 2018-03-07 PROCEDURE — 25000125 ZZHC RX 250: Performed by: THORACIC SURGERY (CARDIOTHORACIC VASCULAR SURGERY)

## 2018-03-07 PROCEDURE — 80069 RENAL FUNCTION PANEL: CPT | Performed by: THORACIC SURGERY (CARDIOTHORACIC VASCULAR SURGERY)

## 2018-03-07 PROCEDURE — 00000146 ZZHCL STATISTIC GLUCOSE BY METER IP

## 2018-03-07 PROCEDURE — P9041 ALBUMIN (HUMAN),5%, 50ML: HCPCS

## 2018-03-07 PROCEDURE — 93005 ELECTROCARDIOGRAM TRACING: CPT

## 2018-03-07 PROCEDURE — 84132 ASSAY OF SERUM POTASSIUM: CPT | Performed by: PHYSICIAN ASSISTANT

## 2018-03-07 PROCEDURE — 25000131 ZZH RX MED GY IP 250 OP 636 PS 637: Performed by: THORACIC SURGERY (CARDIOTHORACIC VASCULAR SURGERY)

## 2018-03-07 PROCEDURE — 84100 ASSAY OF PHOSPHORUS: CPT | Performed by: PHYSICIAN ASSISTANT

## 2018-03-07 PROCEDURE — 25000128 H RX IP 250 OP 636: Performed by: ANESTHESIOLOGY

## 2018-03-07 PROCEDURE — 85300 ANTITHROMBIN III ACTIVITY: CPT | Performed by: THORACIC SURGERY (CARDIOTHORACIC VASCULAR SURGERY)

## 2018-03-07 PROCEDURE — 25000128 H RX IP 250 OP 636: Performed by: STUDENT IN AN ORGANIZED HEALTH CARE EDUCATION/TRAINING PROGRAM

## 2018-03-07 PROCEDURE — 25000125 ZZHC RX 250: Performed by: TRANSPLANT SURGERY

## 2018-03-07 RX ORDER — HEPARIN SODIUM 10000 [USP'U]/100ML
0-3500 INJECTION, SOLUTION INTRAVENOUS CONTINUOUS
Status: DISCONTINUED | OUTPATIENT
Start: 2018-03-07 | End: 2018-03-09

## 2018-03-07 RX ORDER — ALBUMIN, HUMAN INJ 5% 5 %
12.5 SOLUTION INTRAVENOUS ONCE
Status: COMPLETED | OUTPATIENT
Start: 2018-03-07 | End: 2018-03-07

## 2018-03-07 RX ORDER — FUROSEMIDE 10 MG/ML
20 INJECTION INTRAMUSCULAR; INTRAVENOUS ONCE
Status: COMPLETED | OUTPATIENT
Start: 2018-03-07 | End: 2018-03-07

## 2018-03-07 RX ORDER — ALBUMIN, HUMAN INJ 5% 5 %
SOLUTION INTRAVENOUS
Status: COMPLETED
Start: 2018-03-07 | End: 2018-03-07

## 2018-03-07 RX ORDER — FUROSEMIDE 10 MG/ML
20 INJECTION INTRAMUSCULAR; INTRAVENOUS 3 TIMES DAILY
Status: DISCONTINUED | OUTPATIENT
Start: 2018-03-07 | End: 2018-03-08

## 2018-03-07 RX ORDER — METOPROLOL TARTRATE 25 MG/1
25 TABLET, FILM COATED ORAL 2 TIMES DAILY
Status: DISCONTINUED | OUTPATIENT
Start: 2018-03-07 | End: 2018-03-24 | Stop reason: HOSPADM

## 2018-03-07 RX ADMIN — TACROLIMUS 40 MCG/HR: 5 INJECTION, SOLUTION INTRAVENOUS at 00:13

## 2018-03-07 RX ADMIN — NYSTATIN 1000000 UNITS: 500000 SUSPENSION ORAL at 11:51

## 2018-03-07 RX ADMIN — Medication 2 G: at 14:29

## 2018-03-07 RX ADMIN — VANCOMYCIN HYDROCHLORIDE 1000 MG: 1 INJECTION, SOLUTION INTRAVENOUS at 22:14

## 2018-03-07 RX ADMIN — LEVALBUTEROL TARTRATE 2 PUFF: 45 AEROSOL, METERED ORAL at 00:00

## 2018-03-07 RX ADMIN — NYSTATIN 1000000 UNITS: 500000 SUSPENSION ORAL at 15:57

## 2018-03-07 RX ADMIN — DEXTROSE MONOHYDRATE 3.38 G: 5 INJECTION, SOLUTION INTRAVENOUS at 09:25

## 2018-03-07 RX ADMIN — NYSTATIN 1000000 UNITS: 500000 SUSPENSION ORAL at 07:38

## 2018-03-07 RX ADMIN — DEXTROSE MONOHYDRATE 4.5 G: 5 INJECTION, SOLUTION INTRAVENOUS at 21:09

## 2018-03-07 RX ADMIN — METOPROLOL TARTRATE 12.5 MG: 25 TABLET, FILM COATED ORAL at 07:38

## 2018-03-07 RX ADMIN — SODIUM CHLORIDE, POTASSIUM CHLORIDE, SODIUM LACTATE AND CALCIUM CHLORIDE 500 ML: 600; 310; 30; 20 INJECTION, SOLUTION INTRAVENOUS at 01:32

## 2018-03-07 RX ADMIN — POTASSIUM PHOSPHATE, MONOBASIC AND POTASSIUM PHOSPHATE, DIBASIC 15 MMOL: 224; 236 INJECTION, SOLUTION INTRAVENOUS at 05:40

## 2018-03-07 RX ADMIN — SODIUM CHLORIDE, POTASSIUM CHLORIDE, SODIUM LACTATE AND CALCIUM CHLORIDE 500 ML: 600; 310; 30; 20 INJECTION, SOLUTION INTRAVENOUS at 05:50

## 2018-03-07 RX ADMIN — ALBUMIN HUMAN 12.5 G: 50 SOLUTION INTRAVENOUS at 21:30

## 2018-03-07 RX ADMIN — HEPARIN SODIUM 5000 UNITS: 5000 INJECTION, SOLUTION INTRAVENOUS; SUBCUTANEOUS at 09:24

## 2018-03-07 RX ADMIN — HUMAN INSULIN 5 UNITS/HR: 100 INJECTION, SOLUTION SUBCUTANEOUS at 02:41

## 2018-03-07 RX ADMIN — FUROSEMIDE 20 MG: 10 INJECTION, SOLUTION INTRAVENOUS at 15:58

## 2018-03-07 RX ADMIN — DEXTROSE MONOHYDRATE: 50 INJECTION, SOLUTION INTRAVENOUS at 18:02

## 2018-03-07 RX ADMIN — POTASSIUM CHLORIDE 20 MEQ: 1.5 POWDER, FOR SOLUTION ORAL at 05:39

## 2018-03-07 RX ADMIN — MYCOPHENOLATE MOFETIL 1500 MG: 200 POWDER, FOR SUSPENSION ORAL at 17:44

## 2018-03-07 RX ADMIN — SILDENAFIL CITRATE 10 MG: 10 POWDER, FOR SUSPENSION ORAL at 17:44

## 2018-03-07 RX ADMIN — MYCOPHENOLATE MOFETIL 1500 MG: 200 POWDER, FOR SUSPENSION ORAL at 07:38

## 2018-03-07 RX ADMIN — FUROSEMIDE 20 MG: 10 INJECTION, SOLUTION INTRAVENOUS at 09:25

## 2018-03-07 RX ADMIN — MULTIVITAMIN 15 ML: LIQUID ORAL at 07:38

## 2018-03-07 RX ADMIN — HUMAN INSULIN 5 UNITS/HR: 100 INJECTION, SOLUTION SUBCUTANEOUS at 23:54

## 2018-03-07 RX ADMIN — METOPROLOL TARTRATE 12.5 MG: 25 TABLET, FILM COATED ORAL at 15:58

## 2018-03-07 RX ADMIN — NYSTATIN 1000000 UNITS: 500000 SUSPENSION ORAL at 20:09

## 2018-03-07 RX ADMIN — PREDNISOLONE SODIUM PHOSPHATE 12.9 MG: 15 SOLUTION ORAL at 20:10

## 2018-03-07 RX ADMIN — Medication 1 PACKET: at 07:38

## 2018-03-07 RX ADMIN — HUMAN INSULIN 2 UNITS/HR: 100 INJECTION, SOLUTION SUBCUTANEOUS at 11:51

## 2018-03-07 RX ADMIN — SILDENAFIL CITRATE 10 MG: 10 POWDER, FOR SUSPENSION ORAL at 09:31

## 2018-03-07 RX ADMIN — PANTOPRAZOLE SODIUM 40 MG: 40 TABLET, DELAYED RELEASE ORAL at 07:37

## 2018-03-07 RX ADMIN — NOREPINEPHRINE BITARTRATE 0.03 MCG/KG/MIN: 1 INJECTION INTRAVENOUS at 23:03

## 2018-03-07 RX ADMIN — ACETAMINOPHEN 650 MG: 325 TABLET, FILM COATED ORAL at 20:21

## 2018-03-07 RX ADMIN — ALBUMIN HUMAN 12.5 G: 0.05 INJECTION, SOLUTION INTRAVENOUS at 21:30

## 2018-03-07 RX ADMIN — POTASSIUM CHLORIDE 20 MEQ: 1.5 POWDER, FOR SOLUTION ORAL at 14:29

## 2018-03-07 RX ADMIN — DEXTROSE MONOHYDRATE 3.38 G: 5 INJECTION, SOLUTION INTRAVENOUS at 04:19

## 2018-03-07 RX ADMIN — HEPARIN SODIUM 600 UNITS/HR: 10000 INJECTION, SOLUTION INTRAVENOUS at 11:52

## 2018-03-07 RX ADMIN — METOPROLOL TARTRATE 5 MG: 5 INJECTION INTRAVENOUS at 19:27

## 2018-03-07 RX ADMIN — DEXTROSE MONOHYDRATE 4.5 G: 5 INJECTION, SOLUTION INTRAVENOUS at 15:59

## 2018-03-07 RX ADMIN — VANCOMYCIN HYDROCHLORIDE 1000 MG: 1 INJECTION, SOLUTION INTRAVENOUS at 01:32

## 2018-03-07 RX ADMIN — Medication 2 G: at 01:33

## 2018-03-07 RX ADMIN — CALCIUM GLUCONATE 1 G: 98 INJECTION, SOLUTION INTRAVENOUS at 00:11

## 2018-03-07 RX ADMIN — METOPROLOL TARTRATE 25 MG: 25 TABLET, FILM COATED ORAL at 20:47

## 2018-03-07 RX ADMIN — HEPARIN SODIUM 5000 UNITS: 5000 INJECTION, SOLUTION INTRAVENOUS; SUBCUTANEOUS at 02:18

## 2018-03-07 RX ADMIN — QUETIAPINE 50 MG: 50 TABLET ORAL at 21:10

## 2018-03-07 RX ADMIN — PREDNISOLONE SODIUM PHOSPHATE 12.9 MG: 15 SOLUTION ORAL at 07:38

## 2018-03-07 RX ADMIN — SILDENAFIL CITRATE 10 MG: 10 POWDER, FOR SUSPENSION ORAL at 02:18

## 2018-03-07 RX ADMIN — FUROSEMIDE 20 MG: 10 INJECTION, SOLUTION INTRAVENOUS at 14:29

## 2018-03-07 NOTE — PLAN OF CARE
Problem: Patient Care Overview  Goal: Plan of Care/Patient Progress Review  Outcome: Improving  Following commands, nodding appropriately, GALLOWAY. Propofol off since 1630; drowsy now. Dilaudid gtt at 0.5 mg/hr; pt denies pain.  Extremities dusky, mottled, cool, capillary refill remains present; doppler pulses in BLE in posterior tibial and LUE radial; RUE unable to doppler Radial pulse, ulnar pulse present with doppler.   Developed Afib with RVR this afternoon; PRN 5mg IV metoprolol given; rhythm became A.Flutter and eventually converted to SR/ST.   iFlolan weaned off this afternoon; ABG completed before start of pressure support trial (7/5, Fio2 40%). LS coarse this morning; bronchoscopy completed in the afternoon with samples sent.  Large watery stool output; fecal pouch applied with 200 ml output in < 8 hours. Bowel regimen adjusted per MDs; hold bowel medications this evening, may need to add fiber supplement.  UOP large with BID lasix; K replaced per protocol.  See flowsheets for hemodynamics and I/O. Recent CVP 10; PA 27/12; SVO2 64.    1900: ABG drawn after 45 minutes of pressure support; awaiting determination from MD if pt able to extubate this evening.

## 2018-03-07 NOTE — PLAN OF CARE
Problem: Patient Care Overview  Goal: Plan of Care/Patient Progress Review  Discharge Planner SLP   Patient plan for discharge: unknown  Current status: Clinical swallow evaluation completed per MD order. Pt presents with moderate oral-pharyngeal dysphagia in the setting of prolonged intubation. Pt demonstrated high risk for aspiration with small trials of ice chips. Tolerated nectar-thick liquids and purees without overt difficulty, however, fatigue evident as session progressed. Recommend continue TF as primary source of nutrition and initiate nectar-thick full liquid diet as tolerated when Pt awake/alert and sitting completely upright. ST to follow for PO tolerance and to assist with diet advance as appropriate  Barriers to return to prior living situation: dysphagia; deeconditioning  Recommendations for discharge: inpatient rehab  Rationale for recommendations: anticipate ongoing ST needs at time of discharge       Entered by: Fallon Kennedy 03/07/2018 11:23 AM

## 2018-03-07 NOTE — PHARMACY-VANCOMYCIN DOSING SERVICE
"Pharmacy Vancomycin Consult Note    Date of Service 2018  Patient's  1960   57 year old, female    Indication: Perioperative Prophylaxis  Goal Trough Level: 15-20 mg/L  Day of Therapy: 7  Current Vancomycin regimen: Intermittent Dosing    Current estimated CrCl = Estimated Creatinine Clearance: 37.1 mL/min (based on Cr of 1.65).    Creatinine for last 3 days  3/3/2018: 11:49 PM Creatinine 2.12 mg/dL  3/4/2018:  3:53 AM Creatinine 2.14 mg/dL;  8:15 AM Creatinine 2.09 mg/dL; 12:05 PM Creatinine 2.06 mg/dL;  4:03 PM Creatinine 2.08 mg/dL;  7:53 PM Creatinine 2.13 mg/dL; 11:55 PM Creatinine 1.95 mg/dL  3/5/2018:  3:55 AM Creatinine 1.83 mg/dL;  8:42 AM Creatinine 1.80 mg/dL; 11:32 AM Creatinine 1.79 mg/dL;  4:48 PM Creatinine 1.85 mg/dL  3/6/2018:  4:02 AM Creatinine 1.65 mg/dL    Recent Vancomycin Levels (past 3 days)  3/4/2018:  3:53 AM Vancomycin Level 17.6 mg/L  3/5/2018:  8:19 PM Vancomycin Level 12.1 mg/L  3/6/2018:  7:36 PM Vancomycin Level 16.0 mg/L    Body mass index is 23.82 kg/(m^2).  Height is 5' 3\".   Wt Readings from Last 2 Encounters:   18 61 kg (134 lb 7.7 oz)   18 51.5 kg (113 lb 9.6 oz)       Vancomycin IV Administrations (past 72 hours)                   vancomycin (VANCOCIN) 1000 mg in dextrose 5% 200 mL PREMIX (mg) 1,000 mg New Bag 18 4537              Nephrotoxins and other renal medications (Future)    Start     Dose/Rate Route Frequency Ordered Stop    18 1000  piperacillin-tazobactam (ZOSYN) 3.375 g in D5W 100 mL intermittent infusion      3.375 g  100 mL/hr over 1 Hours Intravenous EVERY 6 HOURS 18 0820      18 1800  tacrolimus (PROGRAF) 5,000 mcg in D5W 250 mL      40 mcg/hr  2 mL/hr  Intravenous CONTINUOUS 18 1748      18 1002  vancomycin place jordan - receiving intermittent dosing      1 each Does not apply SEE ADMIN INSTRUCTIONS 18 1003           Contrast Orders - past 72 hours     None        Interpretation of levels " and current regimen:  Trough level is Therapeutic  Has serum creatinine changed > 50% in last 72 hours: No  Urine output: good urine output  Renal Function: Improving    Plan:  1.  Start scheduled vancomycin 1000 mg IV every 24 hours. If renal function continues to improve will adjust frequency further.  2.  Pharmacy will check trough levels as appropriate in 3-5 Days.    3.  Serum creatinine levels will be ordered daily for the first week of therapy and at least twice weekly for subsequent weeks.      Kortney Hutchinson, PharmD  March 6, 2018              .

## 2018-03-07 NOTE — PROGRESS NOTES
Pulmonary Medicine  Cystic Fibrosis - Lung Transplant Daily Progress Note   March 7, 2018       Patient: Kecia Blue  MRN: [id number]  Transplant Date: 2/21/2018 (POD# 6)  Admission date: 2/21/2018  Hospital Day #14          Assessment and Plan:    Kecia Blue is a 58 y/o female w/PMHx significant for dermatomyositis (on immunosuppression), seronegative RA, ILD, and pulmonary hypertension who was admitted for emergent lung transplant w/u on 2/21 for increased SOB and hypoxia. Required V-A ECMO for right heart failure on 2/27. Now s/p bilateral sequential lung transplant on 3/1, ECMO decannulation on 3/3. Post-op course c/b hemodynamic instability requiring pressors and iFlolan. Was extubated O/N of 3/7, flolan d/c'd, off pressors.    Plan Today:  -Increased tacrolimus from 40 mcg/hr to 80 mcg/hr, Will check level in AM.  -Increase metoprolol from 12.5 mg TID to 25 mg BID  -Speech Eval (still has NJ tube)  -Pull swan/art line  -D/C sched bowel regimen and change to prn  -PT consult  -F/U on donor culture results  -considered nitroglycerin paste for feet but this would interact with sildenafil to cause too much hypotension, so we plan to keep feet covered and warm  -Pt has DVT in L UE; Heparin drip started 3/7  -Speech recs initate nectar thickened full liquid diet  -if pt continues to have recurrent Afib with RVR consider EP consultation.       # S/p bilateral lung transplant on 3/1/18 for acute hypoxic/hypercapneic respiratory failure 2/2 ILD  #  Extubated 3/6/18  Adequate graft function. Weaned off iFlolan. On NC of 1.5 and oxygenating well.   - Antibiotics as noted below  - Agree with lasix intermittent dosing (lasix q6hr) while watching UOP, Cr, and BP  - Continue aggressive pulmonary toilet with chest physiotherapy QID (acapella/incentive spirometry once extubated)  - Chest tube management per CVTS  - Explant pathology pending, continue to follow                                  Continue 3-drug  immunosuppression:  Goal tacrolimus target level 9-11 (reduced for RADHA)  - IV tacrolimus @ 80 mcg/hr today (increased tacrolimus gtt rate to 80 mcg/hr from 40 mcg/hr), as  tacrolimus drug level was subtherapeutic at 4.9. We will continue to monitor drug levels daily while on infusion, adjust accordingly.    - MMF 1500 g BID  - Prednisolone 12.9mg BID     Prophylaxis:   PJP: Bactrim  CMV: will begin vangancyclovir 900mg daily POD #8 (ordered)  Fungal: Nystatin       Donor Recipient Intervention   CMV status  Pos   Pos  Valgancyclovir POD#8   EBV status Pos Pos Immune    HSV status N/A  Pos Immune      #Infectious disease  #SIRS  No known infectious disease history. SIRS likely a response from multiple procedures (VA ECMO cannulation and decannulation) and transplant surgery. Fever curve improving. Negative recipient bronch culture from time of transplant. Pan cultured 3/4 NGTD.   - Donor culture at [facility] growing MRSA -- continue vancomycin   - Donor culture at OSH preliminarily growing MSSA, Strep pneumoniae, Moraxella catarrhalis, Haemophilus species. Final sensitivities on donor cultures pending. Continue pip/tazo at this time.     #Nutrtition  -Through TF  -Speech ok with nectar-thickened full liquid diet    #LUE DVT, provoked  Found on U/S on 3/7, started on heparin drip on 3/7.      We appreciate the excellent care provided by the ICU team. We will continue to follow along closely, please do not hesitate to call with any questions or concerns.     Patient discussed with Dr. Christensen.    Alessandra Lombardi MD PhD   Internal Medicine, PGY 1  p             Subjective & Interval History:     Last 24 hour vital signs, labs and care team notes reviewed.    Weaned off pressors overnight. iFlolan off. Had Afib O/N that improved with 1L LR and metoprolol 12.5 mg BID. Off dilaudid. Did experience some delirium O/N, but family is re-orienting patient.     Afebrile O/N. K, Mg, and Ca repleted O/N. Pt gives us a  thumbs up when we ask how she is doing.     Pt is s/p bronchoscopy yesterday.           Review of Systems:     Unable to perform ROS as patient remains intubated, sedated.           Medications:     Active Medications:    metoprolol tartrate  25 mg Oral BID     furosemide  20 mg Intravenous TID     piperacillin-tazobactam  4.5 g Intravenous Q6H     pantoprazole  40 mg Oral or Feeding Tube Daily     [START ON 3/9/2018] valGANciclovir  450 mg Oral or Feeding Tube Once per day on Mon Wed Fri     sennosides  5 mL Oral or Feeding Tube BID     QUEtiapine  50 mg Oral At Bedtime     vancomycin (VANCOCIN) IV  1,000 mg Intravenous Q24H     levalbuterol  2 puff Inhalation Q4H JULES     sulfamethoxazole-trimethoprim  10 mL Oral or NG Tube Once per day on Tue Thu Sat    Or     sulfamethoxazole-trimethoprim  1 tablet Oral or NG Tube Once per day on Tue Thu Sat     sildenafil  10 mg Oral or Feeding Tube Q8H     multivitamins with minerals  15 mL Per Feeding Tube Daily     nystatin  1,000,000 Units Swish & Swallow 4x Daily     mycophenolate  1,500 mg Oral BID IS    Or     mycophenolate  1,500 mg Oral or NG Tube BID IS     prednisoLONE  0.25 mg/kg (Dosing Weight) Oral or NG Tube BID     Active PRN Medications:  potassium phosphate (KPHOS) in D5W IV, potassium phosphate (KPHOS) in D5W IV, potassium phosphate (KPHOS) in D5W IV, potassium phosphate (KPHOS) in D5W IV, potassium phosphate (KPHOS) in D5W IV, heparin, artificial tears, metoprolol, IV fluid REPLACEMENT ONLY, Reason beta blocker order not selected, naloxone, acetaminophen, oxyCODONE IR, ondansetron **OR** ondansetron, IV fluid REPLACEMENT ONLY, glucose **OR** dextrose **OR** glucagon, heparin, potassium chloride, potassium chloride, potassium chloride, potassium chloride with lidocaine, potassium chloride, magnesium sulfate, magnesium sulfate         Physical Exam:     Constitutional: On Nasal cannula, resting in bed, opens eyes to voice and responds to questioning. In no  apparent distress.   HEENT: Eyes with pink conjunctivae, no scleral jaundice. Neck supple without lymphadenopathy. NC in nares.   PULM: Good air flow bilaterally. Clear lung sounds.    CV: Normal S1 and S2. RRR. No murmur, gallop, or rub. No LE edema. Peripheral pulses intact.    ABD: Hypoactive BS, soft, nontender, nondistended. + Grimacing with palpation upon exam.    MSK: Moves all extremities. No apparent muscle wasting. Cap refill < 5 seconds. Feet with toes bilaterally cold with dusky appearance. Upper extremity on L appears more swollen than right without pitting edema. Hands are warm bilaterally.   NEURO:  Following simple commands. Answers questions.   SKIN: No pruritus or rash on limited exam. ?     Lines, Drains, and Devices:  Peripheral IV 02/21/18 Left;Lateral Upper forearm (Active)   Site Assessment WDL 3/6/2018 12:00 PM   Line Status Infusing 3/6/2018 12:00 PM   Phlebitis Scale 0-->no symptoms 3/6/2018 12:00 PM   Infiltration Scale 0 3/6/2018 12:00 PM   Infiltration Site Treatment Method  None 3/5/2018  4:00 AM   Extravasation? No 3/6/2018 12:00 PM   Dressing Intervention New dressing  3/3/2018  4:00 AM   IV Site Rotation Due Date 02/25/18 3/2/2018  4:00 AM   Reason Not Rotated Poor venous access 3/2/2018  4:00 AM   Number of days:13       Arterial Line 02/27/18 Femoral (Active)   Site Assessment WDL 3/6/2018 12:00 PM   Line Status Pulsatile blood flow 3/6/2018 12:00 PM   Art Line Waveform Appropriate 3/6/2018 12:00 PM   Art Line Interventions Leveled 3/6/2018 12:00 PM   Color/Movement/Sensation Capillary refill less than 3 sec 3/6/2018 12:00 PM   Dressing Type Transparent 3/6/2018 12:00 PM   Dressing Status Clean, dry, intact 3/6/2018 12:00 PM   Dressing Intervention Dressing changed/new dressing 3/6/2018  4:00 AM   Dressing Change Due 03/11/18 3/6/2018 12:00 PM   Number of days:7       CVC Double Lumen 03/01/18 (Active)   Site Assessment WDL 3/6/2018 12:00 PM   Extravasation? No 3/6/2018 12:00 PM  "  Dressing Intervention Transparent;Chlorhexidine sponge 3/6/2018 12:00 PM   Dressing Change Due 03/11/18 3/6/2018 12:00 PM   CVC Comment Cordis 3/5/2018  8:00 AM   CVC Lumen Assessment White;Brown;Blue 3/2/2018  4:00 AM   Number of days:5       Pulmonary Artery Catheter - Single Lumen 03/03/18 1000 Left internal jugular vein continuous hemodynamic catheter (Active)   Dressing dressing dry and intact 3/6/2018  8:00 AM   Securement secured with sutures 3/6/2018  8:00 AM   PA Catheter Markings (cm) 51 3/6/2018  8:00 AM   Phlebitis 0-->no symptoms 3/6/2018  8:00 AM   Infiltration 0-->no symptoms 3/6/2018  8:00 AM   Waveform normal 3/6/2018  8:00 AM   Balloon Inflation Volume to Obtain Wedge Tracing (mL) 0 3/4/2018  4:00 AM   Pressure Catheter Interventions blood specimen obtained and sent to lab 3/6/2018  8:00 AM   Daily Review Of Necessity completed 3/6/2018  8:00 AM   Number of days:3            Data:     All vital signs, laboratory and imaging data for the past 24 hours reviewed.      Vital signs:  Temp: 98.2  F (36.8  C) Temp src: Oral BP: 91/64   Heart Rate: 104 Resp: 24 SpO2: 97 % O2 Device: Nasal cannula Oxygen Delivery: 2 LPM Height: 160 cm (5' 3\") Weight: 59.7 kg (131 lb 9.8 oz)    Weight trend:   Vitals:    03/05/18 0400 03/06/18 0430 03/07/18 0400   Weight: 61 kg (134 lb 7.7 oz) 61 kg (134 lb 7.7 oz) 59.7 kg (131 lb 9.8 oz)        I/O:   Intake/Output Summary (Last 24 hours) at 03/06/18 1411  Last data filed at 03/06/18 1400   Gross per 24 hour   Intake          3809.61 ml   Output             3095 ml   Net           714.61 ml       Labs    CMP:   Recent Labs  Lab 03/07/18  1150 03/07/18  0354 03/07/18  0010 03/06/18  2319  03/06/18  0402  03/05/18  1648  03/05/18  0842 03/05/18  0355  03/04/18  0353  03/03/18  0329  03/01/18 2017 03/01/18  0356   NA  --  146* 146*  --   --  151*  --  150*  < > 150* 151*  < > 150*  < > 147*  < > 150*  < > 143   POTASSIUM 4.0 3.8 4.3 4.3  < > 4.2  < > 3.9  < > 4.2 4.0  < > " 4.2  < > 4.2  < > 3.4  < > 4.3   CHLORIDE  --  112* 114*  --   --  120*  --  117*  < > 117* 120*  < > 117*  < > 117*  < > 119*  < > 110*   CO2  --  25 23  --   --  20  --  23  < > 22 21  < > 20  < > 20  < > 24  < > 25   ANIONGAP  --  9 10  --   --  11  --  10  < > 11 10  < > 13  < > 10  < > 7  < > 8   GLC  --  169* 208*  --   --  138*  --  160*  < > 146* 130*  < > 133*  < > 168*  < > 232*  < > 125*   BUN  --  57* 63*  --   --  70*  --  74*  < > 70* 69*  < > 69*  < > 53*  < > 30  < > 37*   CR  --  1.40* 1.50*  --   --  1.65*  --  1.85*  < > 1.80* 1.83*  < > 2.14*  < > 1.80*  < > 0.95  < > 1.25*   GFRESTIMATED  --  39* 36*  --   --  32*  --  28*  < > 29* 29*  < > 24*  < > 29*  < > 61  < > 44*   GFRESTBLACK  --  47* 44*  --   --  39*  --  34*  < > 35* 35*  < > 29*  < > 35*  < > 74  < > 54*   CHELSEY  --  7.7* 7.5*  --   --  7.4*  --  7.7*  < > 7.8* 7.4*  < > 7.8*  < > 7.1*  < > 6.8*  < > 7.8*   MAG 1.9 2.5*  --  1.8  --  2.3  < >  --   --   --  2.5*  < > 2.8*  < > 2.1  < > 1.9  --  2.9*   PHOS 3.0 2.4*  --  2.3*  --  3.3  --   --   --   --  4.0  --  5.0*  --  4.4  < > 3.9  --  3.9   PROTTOTAL  --   --   --   --   --   --   --   --   --   --   --   --   --   --  4.0*  --  3.7*  --  5.6*   ALBUMIN  --  2.0*  --   --   --  2.1*  --   --   --   --  2.1*  --  2.5*  --  2.4*  < > 1.7*  --  2.4*   BILITOTAL  --   --   --   --   --   --   --   --   --   --   --   --   --   --  1.5*  --  1.2  --  0.7   ALKPHOS  --   --   --   --   --   --   --   --   --   --   --   --   --   --  27*  --  35*  --  37*   AST  --   --   --   --   --   --   --   --   --   --   --   --   --   --  34  --  38  --  58*   ALT  --  39  --   --   --  42  --   --   --  40  --   --  40  --  29  < > 65*  --  172*   < > = values in this interval not displayed.  CBC:     Recent Labs  Lab 03/07/18  1150 03/07/18  0354 03/06/18  0402 03/05/18  1648   WBC 16.7* 17.7* 18.3* 20.2*   RBC 2.51* 2.71* 2.71* 2.86*   HGB 7.8* 8.4* 8.2* 8.7*   HCT 24.3* 26.0* 25.4* 26.8*    MCV 97 96 94 94   MCH 31.1 31.0 30.3 30.4   MCHC 32.1 32.3 32.3 32.5   RDW 16.9* 16.7* 16.5* 16.5*    162 141* 160     INR:     Recent Labs  Lab 03/05/18  1648 03/05/18  1132 03/05/18  0842 03/05/18  0355   INR 1.11 1.13 1.13 1.15*     Glucose:   Recent Labs  Lab 03/07/18  1220 03/07/18  1003 03/07/18  0734 03/07/18  0643 03/07/18  0544 03/07/18  0405 03/07/18  0354  03/07/18  0010  03/06/18  0402  03/05/18  1648  03/05/18  1132  03/05/18  0842   GLC  --   --   --   --   --   --  169*  --  208*  --  138*  --  160*  --  243*  --  146*   BGM 91 96 134* 131* 169* 166*  --   < >  --   < >  --   < >  --   < >  --   < >  --    < > = values in this interval not displayed.  Blood Gas:   Recent Labs  Lab 03/07/18  0355 03/07/18  0354 03/07/18  0010  03/06/18 2039   PHV 7.43  --  7.42  --  7.37   PCO2V 41  --  42  --  45   PO2V 31  --  32  --  33   HCO3V 28  --  27  --  26   LASHAUN 3.1  --  2.4  --  0.7   O2PER 2L 2L 2L  < > 3L   < > = values in this interval not displayed.  Culture Data     Recent Labs  Lab 03/06/18  1330 03/04/18  2057 03/04/18  1417 03/04/18  1411 03/03/18  0314 03/02/18  0524 03/01/18  1627 03/01/18  1618 03/01/18  0527 03/01/18  0356   CULT Culture negative monitoring continues  Culture negative after 17 hours No growth No growth after 3 days No growth after 3 days No growth after 4 days No growth after 5 days Culture negative after 4 days  Culture received and in progress.  Positive AFB results are called as soon as detected.  Final report to follow in 7 to 8 weeks.  Assayed at [facility] [address] [place] [address] [phone]  No growth  Canceled, Test credited  Test reordered as correct code Canceled, Test creditedQuantity not sufficientInsufficient volume received for AFB culture and stain, even with the additional specimen sent that was received.Notification of test cancellation was given toDR Anum [facility] 3.6.18 1700  Culture negative after 4 days  Light  growthMethicillin resistant Staphylococcus aureus (MRSA)*  Critical Value/Significant Value called to and read back byJELANI HASSAN RN ([id number]).  03.03.18 2334 GJS  Canceled, Test credited  Test reordered as correct code Canceled, Test creditedDuplicate request  Light growthNormal alo No growth     Virology Data:   Lab Results   Component Value Date    FLUAH1 Negative 02/27/2018    FLUAH3 Negative 02/27/2018    OO7732 Negative 02/27/2018    IFLUB Negative 02/27/2018    RSVA Negative 02/27/2018    RSVB Negative 02/27/2018    PIV1 Negative 02/27/2018    PIV2 Negative 02/27/2018    PIV3 Negative 02/27/2018    HMPV Negative 02/27/2018    HRVS Negative 02/27/2018    ADVBE Negative 02/27/2018    ADVC Negative 02/27/2018     Historical CMV results (last 3 of prior testing):  Lab Results   Component Value Date    CMVQNT Canceled, Test credited (A) 03/01/2018    CMVQNT Canceled, Test credited (A) 03/01/2018     Lab Results   Component Value Date    CMVLOG Canceled, Test credited 03/01/2018    CMVLOG Canceled, Test credited 03/01/2018     Urine Studies    Recent Labs   Lab Test  03/04/18   1425   URINEPH  5.5   NITRITE  Negative   LEUKEST  Negative   WBCU  5     Physician Attestation   I, Tarik Mr Christensen, saw this patient with  and agree with the findings and plan of care as documented in the above note.      I personally reviewed vital signs, medications, labs and imaging.    Tarik Mr Christensen  Date of Service (when I saw the patient): 03/07/18

## 2018-03-07 NOTE — PROGRESS NOTES
CLINICAL NUTRITION SERVICES - REASSESSMENT NOTE     Nutrition Prescription    RECOMMENDATIONS FOR MDs/PROVIDERS TO ORDER:  1. Maintain FT until pt eating >2/3 of nutritional needs (1100 kcal and 50 g PRO).  2. Free water flushes per team pending Na trends  3. D/c senna if continues w/ high stool outputs    Malnutrition Status:    Severe malnutrition in the context of acute on chronic illness    Recommendations already ordered by Registered Dietitian (RD):  1. Increase slightly to Nutren 1.5 @ goal 45 ml/hr (1080 ml/day) to provide 1620 kcals (32 kcal/kg/day), 73 g PRO (1.5 g/kg/day), 821 ml free H2O, 190 g CHO and 0g Fiber daily.    2. D/c ProSource.    Future/Additional Recommendations:  1. Once diet >liquid textures, order calorie counts to assess PO adequacy/ability to taper TFs. If/when wish to cycle TF, recommend initially Nutren 1.5 @ 60 mL/hr x 12 hours (720 mL/day) to provide 1080 kcal and 49 g PRO (~60-70% of needs). Adjust TF cycle as needed per calorie counts so that PO + TF = 100% of needs.    2. Offer ONS as able pending appropriate diet textures    3. If needs fiber supplement pending stool trends, would start with 1 pkt QID NutriSource fiber.      EVALUATION OF THE PROGRESS TOWARD GOALS   Diet: SLP advanced to nectar thick full liquids    Enteral Access: NDT (placed by RD 2/28)    Nutrition Support: Nutren 1.5 @ 40 ml/hr + 1 pkt ProSource BID for total 1520 kcal (30 kcal/kg) and 87 g PRO (1.7 g/kg)    Intake: TF ran @ 10 mL/hr 2/28-3/ per team (hemodynamics). TF advanced to goal 3/6. Average intakes over past 7 days (TF + Propofol + Prosource) = 668 kcal (13 kcal/kg or 45% of needs) and 31 g PRO (0.6 g/kg or 48% of needs)     NEW FINDINGS   -Resp: Extubated yesterday.  -Wt: Lowest wt remains 50.3 kg on 2/23. Wt currently elevated @ 59.7 kg.  -GI: No BM from 2/26-3/4. On bowel regimen, had 1000 mL stool out since midnight.  -Skin: Stage 2 PI on bridge of nose d/t bipap mask, healing per WOC note 3/5.    -Labs: Na 146 (trending down) on 60 mL q2h free water flushes.      MALNUTRITION  % Intake: </= 50% for >/= 5 days (severe)  % Weight Loss: None noted  Subcutaneous Fat Loss: Facial region, Upper arm, Lower arm and Thoracic/intercostal: moderate  Muscle Loss: Facial & jaw region, Thoracic region (clavicle, acromium bone, deltoid, trapezius, pectoral), Upper arm (bicep, tricep), Lower arm  (forearm) and Posterior calf: moderate  Fluid Accumulation/Edema: Does not meet criteria (trace)  Malnutrition Diagnosis: Severe malnutrition in the context of acute on chronic illness    Previous Goals   Patient to consume % of nutritionally adequate meal trays TID, or the equivalent with supplements/snacks. Suspect this goal was an error (should have been Total avg nutritional intake to meet a minimum of 25 kcal/kg and 1.2 g PRO/kg daily (per dosing wt 50 kg))  Evaluation: Not met, did not meet needs    Previous Nutrition Diagnosis  Inadequate protein-energy intake related to NPO x 1 day, intubated with no nutrition support yet started as evidenced by PO intakes reduced x 2 days prior to intubation, muscle/fat wasting on physical exam   Evaluation: Improving with TF start    CURRENT NUTRITION DIAGNOSIS  Inadequate protein-energy intake related to inadequate enteral nutrition infusion as evidenced by average intakes over past 7 days met 45% kcal and 48% protein needs.      INTERVENTIONS  Implementation  Enteral Nutrition - Adjust regimen to Nutren 1.5 @ 45 mL/hr in order to d/c protein modular.     Goals  Total avg nutritional intake to meet a minimum of 30 kcal/kg and 1.3 g PRO/kg daily (per dosing wt 50 kg).    Monitoring/Evaluation  Progress toward goals will be monitored and evaluated per protocol.    Natalia Sargent, RD, LD, CNSC  CVICU Dietitian  Pager: [phone]

## 2018-03-07 NOTE — PROGRESS NOTES
"CV Surgery Note    Extubated yesterday. Off drips. Lines removed this morning.    Vital signs:  Temp: 98.2  F (36.8  C) Temp src: Oral BP: 95/68   Heart Rate: 106 Resp: 24 SpO2: 95 % O2 Device: Nasal cannula Oxygen Delivery: 2 LPM Height: 160 cm (5' 3\") Weight: 59.7 kg (131 lb 9.8 oz)  Estimated body mass index is 23.31 kg/(m^2) as calculated from the following:    Height as of this encounter: 1.6 m (5' 3\").    Weight as of this encounter: 59.7 kg (131 lb 9.8 oz).      Drips: Off    UO: 1480 / 24hr  CT: 450 / 24 hr    WBC: 16.7, Hgb: 7.8, Plt: 157  Na: 146, K: 3.8, Crt: 1.4    Imaging:   CXR (3/7): Perihilar patchy infiltrates / congestion    Exam:   In bed, awake, conversant  Chest clear, RRR   Incision clean and intact   Chest tubes with serosanguinous output     A/P: POD # 6 bilateral lung transplant    Neuro: Pain control  CV: Metoprolol. DC lines. Off drips. Increase diuresis.  Resp: Stable. Aggressive pulmonary toilet. Increase diuresis.  GI: Swallow study.  Renal: Stable. Increase diuresis.   Heme: Stable. Continue to monitor Hgb.  ID: Perioperative ABX  Immunosuppression: Stable. Per pulm.  PPx: Heparin SQ, SCDs, OOB  Dispo: Continue ICU care    Jacinto Ackerman  CT Surgery        "

## 2018-03-07 NOTE — PROGRESS NOTES
I personally saw, examined and evaluated the patient. I agree with the above documentation, assessment and plan, as outlined in the Resident/Fellow/NP/PA's note.    Assessment: 57 year old female with PMH significant for dermatomyositis on immunosuppression, RA, ILD, and pulmonary hypertension, who was admitted for emergent lung transplant work-up on 2/21 for increasing shortness of breath and hypoxia. S/P emergent [facility] ECMO placement on 2/27/18. S/P bilateral lung transplant on 3/1.    Critical Care Diagnoses:        1. Ventilator Dependent Respiratory Failure - resolved                                                  2. Cardiogenic Shock - resolved                                                  3. Atrial Fibrillation with Rapid Ventricular Response - improving                                                  4. Pulmonary HTN - stable                                                  5. Acute Right Ventricular Heart Failure - resolved                                                  6. Anemia - stable                                                  7. Hypernatremia - resolved                                                  8. Constipation - resolved                                                                      9. Concern for Protein-Calorie Malnutrition                                                 10. Acute Renal Failure with Hyperchloremia - improving  Plan:     1. Increase metoprolol to 25mg BID  2. D/C arterial line  3. OOB with mobilization  4. D/C [place]  5. Lasix 20mg IV Q8H   6. Incentive spirometry and pulmonary toilet  7. Obtain speech evaluation today and advance diet as tolerated  8. Pain control with oxycodone PO PRN  9. Continue free water to 60cc Q2H  10. D/C bowel regimen  11. Goal fluid balance is -1.5L by tomorrow  12. Plan for Transfer to Floor tomorrow      Total Critical Care Time: 34 minutes    Dr. Shree Grant M.D.

## 2018-03-07 NOTE — PROGRESS NOTES
Transplant Social Work Services Progress Note      Date of Initial Social Work Evaluation: 2/20/2018   Collaborated with: Patient's  and daughter.    Data: Met with patient's  and daughter in the family waiting room to provide a supportive visit. The two of them gave me an update on how Kecia is doing. Both of them were happy and expressed gratitude for Kecia's progress from yesterday to today. The  especially likes that Kecia's goals are big ones so she can make progress. They expressed that although she is confused, they continue to see parts of her personality and that brings them elton and comfort.  Intervention: Supportive counseling.  Assessment: The patient's  and daughter are coping well.  Education provided by SW: Validating feelings about where Kecia is at, support group.  Plan:    Discharge Plans in Progress: N/A    Barriers to d/c plan: Medical stability    Follow up Plan: Will continue to follow the patient and family throughout hospitalization.

## 2018-03-07 NOTE — PLAN OF CARE
Problem: Patient Care Overview  Goal: Plan of Care/Patient Progress Review    Discharge Planner OT   Patient plan for discharge: not stated  Current status: Pt provided with lung pillow and educated on sternal precautions, impact on activity/ADLs, and compensatory strategies for tasks. Pt able to complete sit<>stand from recliner x 2 with mod A of 1 and VCs; tolerated standing ~30s each time. Pt reported significant fatigue with activity - pulse ox with poor reading however O2 sats appeared to range 88-96% with activity on 2L NC (RR elevated to low 40s from mid 20s) - cues for PLB. Pt's -121, pre BP 81/67 (MAP 71), post BP 72/51 (MAP 58) - RN present/aware of BP drop.  Barriers to return to prior living situation: weakness, SOB, impaired balance, pain, surgical precautions  Recommendations for discharge: TCU vs ARU pending tolerance for therapies  Rationale for recommendations: to increase pt's (I) with ADL/IADLs       Entered by: Evelyn Franklin 03/07/2018 4:46 PM

## 2018-03-07 NOTE — PROGRESS NOTES
03/07/18 1115   General Information   Onset Date 02/21/18   Start of Care Date 03/07/18   Referring Physician Kwesi Schwartz MD   Patient Profile Review/OT: Additional Occupational Profile Info See Profile for full history and prior level of function   Patient/Family Goals Statement Pt wants to drink water   Swallowing Evaluation Bedside swallow evaluation   Behaviorial Observations WFL (within functional limits)  (daughter present and involved)   Mode of current nutrition NPO   Respiratory Status O2 Supply   Type of O2 supply Nasal cannula   Comments Orders received for swallow evaluation. Pt with PMH significant for dermatomyositis on immunosuppression, eronegative RA, ILD, and pulmonary hypertension, who was admitted for emergent lung transplant work-up on 2/21 for increasing shortness of breath and hypoxia now s/p emergent [facility] placement on 2/27/18. Now s/p bilateral lung transplant on 3/1 and [facility]O decannulation on 3/3 with Dr. Hernandez. Pt extubated 3/6/18   Clinical Swallow Evaluation   Oral Musculature generally intact   Structural Abnormalities none present   Dentition present and adequate   Mucosal Quality dry;sticky   Mandibular Strength and Mobility intact   Oral Labial Strength and Mobility WFL   Lingual Strength and Mobility WFL   Velar Elevation intact   Buccal Strength and Mobility impaired   Laryngeal Function Cough;Throat clear;Swallow;Voicing initiated;Dry swallow palpated   Additional Documentation Yes   Swallow Eval   Feeding Assistance dependent   Clinical Swallow Eval: Thin Liquid Texture Trial   Mode of Presentation, Thin Liquids spoon;fed by clinician   Volume of Liquid or Food Presented ice chips x3   Oral Phase of Swallow (reduced bolus formation/control)   Pharyngeal Phase of Swallow impaired;coughing/choking  (overt s/sx of aspiration observed)   Diagnostic Statement high aspiration risk with small ice chips, not approrpiate for additional trials of thin liquids   Clinical  Swallow Eval: Nectar Thick Liquid Texture Trial   Mode of Presentation, Nectar spoon;straw;fed by clinician   Volume of Nectar Presented 3oz   Oral Phase, Nectar WFL   Pharyngeal Phase, Nectar intact  (no overt s/sx of aspiration observed)   Diagnostic Statement swallow appears functional for nectar-thick liquids   Clinical Swallow Eval: Puree Solid Texture Trial   Mode of Presentation, Puree spoon;fed by clinician   Volume of Puree Presented tsp bites x5   Oral Phase, Puree WFL   Pharyngeal Phase, Puree intact  (no overt s/sx of aspiration observed)   Diagnostic Statement swallow appears functional for puree textures   Clinical Swallow Eval: Semisolid Texture Trial   Diagnostic Statement Pt not appropriate fore trials of more advanced textures this date due to weakness/deconditioning   Swallow Compensations   Swallow Compensations Pacing;Reduce amounts   Esophageal Phase of Swallow   Patient reports or presents with symptoms of esophageal dysphagia No   General Therapy Interventions   Planned Therapy Interventions Dysphagia Treatment   Dysphagia treatment Modified diet education;Instruction of safe swallow strategies   Swallow Eval: Clinical Impressions   Skilled Criteria for Therapy Intervention Skilled criteria met.  Treatment indicated.   Functional Assessment Scale (FAS) 3   Treatment Diagnosis moderate dysphagia   Diet texture recommendations Nectar thick liquids;Full liquid   Recommended Feeding/Eating Techniques alternate between small bites and sips of food/liquid;maintain upright posture during/after eating for 30 mins;small sips/bites   Demonstrates Need for Referral to Another Service occupational therapy;physical therapy;respiratory therapy;dietitian   Therapy Frequency daily   Predicted Duration of Therapy Intervention (days/wks) 3 weeks   Anticipated Discharge Disposition inpatient rehabilitation facility   Risks and Benefits of Treatment have been explained. Yes   Patient, family and/or staff in  agreement with Plan of Care Yes   Clinical Impression Comments Clinical swallow evaluation completed per MD order. Pt presents with moderate oral-pharyngeal dysphagia in the setting of prolonged intubation. Pt demonstrated high risk for aspiration with small trials of ice chips. Tolerated nectar-thick liquids and purees without overt difficulty, however, fatigue evident as session progressed. Recommend continue TF as primary source of nutrition and initiate nectar-thick full liquid diet as tolerated when Pt awake/alert and sitting completely upright. ST to follow for PO tolerance and to assist with diet advance as appropriate   Total Evaluation Time   Total Evaluation Time (Minutes) 9

## 2018-03-07 NOTE — PLAN OF CARE
Problem: Lung Transplantation  Goal: Lung Transplantation  Signs and symptoms of listed problems will be absent or manageable.   Outcome: Improving  Pt extubated to 3 L NC at 1955. Pt lethargic, but oriented x 4. Increasing confusion noted, ABG sent and WNL, seroquel started. Pt went into A-fib RVR vs SVT around 2300, 's. IV Lopressor 5 mg x 2. Elytes replaced. 500 mL LR bolus for CVP: 6, In and out of SR/ST and A-fib, Repeat bolus 500 mL LR for run of -140's. Good UOP, CT output 20-70 q 2 hrs. Will continue to monitor and notify team with concerns.

## 2018-03-08 ENCOUNTER — APPOINTMENT (OUTPATIENT)
Dept: GENERAL RADIOLOGY | Facility: CLINIC | Age: 58
DRG: 003 | End: 2018-03-08
Attending: PHYSICIAN ASSISTANT
Payer: COMMERCIAL

## 2018-03-08 ENCOUNTER — APPOINTMENT (OUTPATIENT)
Dept: GENERAL RADIOLOGY | Facility: CLINIC | Age: 58
DRG: 003 | End: 2018-03-08
Attending: INTERNAL MEDICINE
Payer: COMMERCIAL

## 2018-03-08 ENCOUNTER — APPOINTMENT (OUTPATIENT)
Dept: PHYSICAL THERAPY | Facility: CLINIC | Age: 58
DRG: 003 | End: 2018-03-08
Attending: THORACIC SURGERY (CARDIOTHORACIC VASCULAR SURGERY)
Payer: COMMERCIAL

## 2018-03-08 ENCOUNTER — APPOINTMENT (OUTPATIENT)
Dept: CARDIOLOGY | Facility: CLINIC | Age: 58
DRG: 003 | End: 2018-03-08
Attending: THORACIC SURGERY (CARDIOTHORACIC VASCULAR SURGERY)
Payer: COMMERCIAL

## 2018-03-08 ENCOUNTER — APPOINTMENT (OUTPATIENT)
Dept: CT IMAGING | Facility: CLINIC | Age: 58
DRG: 003 | End: 2018-03-08
Attending: INTERNAL MEDICINE
Payer: COMMERCIAL

## 2018-03-08 LAB
ABO + RH BLD: NORMAL
ABO + RH BLD: NORMAL
ALBUMIN SERPL-MCNC: 1.8 G/DL (ref 3.4–5)
ALT SERPL W P-5'-P-CCNC: 28 U/L (ref 0–50)
ANION GAP SERPL CALCULATED.3IONS-SCNC: 11 MMOL/L (ref 3–14)
AT III ACT/NOR PPP CHRO: 92 % (ref 85–135)
BACTERIA SPEC CULT: NO GROWTH
BACTERIA SPEC CULT: NO GROWTH
BASOPHILS # BLD AUTO: 0 10E9/L (ref 0–0.2)
BASOPHILS NFR BLD AUTO: 0.1 %
BLD GP AB SCN SERPL QL: NORMAL
BLD PROD TYP BPU: NORMAL
BLD PROD TYP BPU: NORMAL
BLD UNIT ID BPU: 0
BLOOD BANK CMNT PATIENT-IMP: NORMAL
BLOOD PRODUCT CODE: NORMAL
BPU ID: NORMAL
BUN SERPL-MCNC: 48 MG/DL (ref 7–30)
C DIFF TOX B STL QL: NEGATIVE
CALCIUM SERPL-MCNC: 6.8 MG/DL (ref 8.5–10.1)
CHLORIDE SERPL-SCNC: 107 MMOL/L (ref 94–109)
CO2 SERPL-SCNC: 24 MMOL/L (ref 20–32)
COPATH REPORT: NORMAL
CREAT SERPL-MCNC: 1.33 MG/DL (ref 0.52–1.04)
D DIMER PPP FEU-MCNC: 1.5 UG/ML FEU (ref 0–0.5)
D DIMER PPP FEU-MCNC: 1.5 UG/ML FEU (ref 0–0.5)
DIFFERENTIAL METHOD BLD: ABNORMAL
EOSINOPHIL # BLD AUTO: 0.3 10E9/L (ref 0–0.7)
EOSINOPHIL NFR BLD AUTO: 1.4 %
ERYTHROCYTE [DISTWIDTH] IN BLOOD BY AUTOMATED COUNT: 16.8 % (ref 10–15)
GFR SERPL CREATININE-BSD FRML MDRD: 41 ML/MIN/1.7M2
GLUCOSE BLDC GLUCOMTR-MCNC: 109 MG/DL (ref 70–99)
GLUCOSE BLDC GLUCOMTR-MCNC: 121 MG/DL (ref 70–99)
GLUCOSE BLDC GLUCOMTR-MCNC: 124 MG/DL (ref 70–99)
GLUCOSE BLDC GLUCOMTR-MCNC: 133 MG/DL (ref 70–99)
GLUCOSE BLDC GLUCOMTR-MCNC: 134 MG/DL (ref 70–99)
GLUCOSE BLDC GLUCOMTR-MCNC: 139 MG/DL (ref 70–99)
GLUCOSE BLDC GLUCOMTR-MCNC: 142 MG/DL (ref 70–99)
GLUCOSE BLDC GLUCOMTR-MCNC: 142 MG/DL (ref 70–99)
GLUCOSE BLDC GLUCOMTR-MCNC: 144 MG/DL (ref 70–99)
GLUCOSE BLDC GLUCOMTR-MCNC: 146 MG/DL (ref 70–99)
GLUCOSE BLDC GLUCOMTR-MCNC: 149 MG/DL (ref 70–99)
GLUCOSE BLDC GLUCOMTR-MCNC: 159 MG/DL (ref 70–99)
GLUCOSE BLDC GLUCOMTR-MCNC: 159 MG/DL (ref 70–99)
GLUCOSE BLDC GLUCOMTR-MCNC: 161 MG/DL (ref 70–99)
GLUCOSE BLDC GLUCOMTR-MCNC: 182 MG/DL (ref 70–99)
GLUCOSE BLDC GLUCOMTR-MCNC: 197 MG/DL (ref 70–99)
GLUCOSE BLDC GLUCOMTR-MCNC: 44 MG/DL (ref 70–99)
GLUCOSE BLDC GLUCOMTR-MCNC: 80 MG/DL (ref 70–99)
GLUCOSE BLDC GLUCOMTR-MCNC: 98 MG/DL (ref 70–99)
GLUCOSE SERPL-MCNC: 147 MG/DL (ref 70–99)
HCT VFR BLD AUTO: 22 % (ref 35–47)
HGB BLD-MCNC: 7.1 G/DL (ref 11.7–15.7)
HGB BLD-MCNC: 8.8 G/DL (ref 11.7–15.7)
HGB FREE PLAS-MCNC: <30 MG/DL
IMM GRANULOCYTES # BLD: 0.1 10E9/L (ref 0–0.4)
IMM GRANULOCYTES NFR BLD: 0.4 %
LMWH PPP CHRO-ACNC: 0.66 IU/ML
LMWH PPP CHRO-ACNC: <0.1 IU/ML
LMWH PPP CHRO-ACNC: <0.1 IU/ML
LYMPHOCYTES # BLD AUTO: 1.1 10E9/L (ref 0.8–5.3)
LYMPHOCYTES NFR BLD AUTO: 6.2 %
MAGNESIUM SERPL-MCNC: 2.2 MG/DL (ref 1.6–2.3)
MCH RBC QN AUTO: 30.6 PG (ref 26.5–33)
MCHC RBC AUTO-ENTMCNC: 32.3 G/DL (ref 31.5–36.5)
MCV RBC AUTO: 95 FL (ref 78–100)
MONOCYTES # BLD AUTO: 0.2 10E9/L (ref 0–1.3)
MONOCYTES NFR BLD AUTO: 0.9 %
NEUTROPHILS # BLD AUTO: 16.2 10E9/L (ref 1.6–8.3)
NEUTROPHILS NFR BLD AUTO: 91 %
NRBC # BLD AUTO: 0 10*3/UL
NRBC BLD AUTO-RTO: 0 /100
NUM BPU REQUESTED: 1
PHOSPHATE SERPL-MCNC: 2.8 MG/DL (ref 2.5–4.5)
PLATELET # BLD AUTO: 178 10E9/L (ref 150–450)
POTASSIUM SERPL-SCNC: 3.9 MMOL/L (ref 3.4–5.3)
RADIOLOGIST FLAGS: ABNORMAL
RBC # BLD AUTO: 2.32 10E12/L (ref 3.8–5.2)
SODIUM SERPL-SCNC: 142 MMOL/L (ref 133–144)
SPECIMEN EXP DATE BLD: NORMAL
SPECIMEN SOURCE: NORMAL
TACROLIMUS BLD-MCNC: 8.3 UG/L (ref 5–15)
TME LAST DOSE: NORMAL H
TRANSFUSION STATUS PATIENT QL: NORMAL
TRANSFUSION STATUS PATIENT QL: NORMAL
WBC # BLD AUTO: 17.8 10E9/L (ref 4–11)

## 2018-03-08 PROCEDURE — 25000128 H RX IP 250 OP 636: Performed by: ANESTHESIOLOGY

## 2018-03-08 PROCEDURE — 40000141 ZZH STATISTIC PERIPHERAL IV START W/O US GUIDANCE

## 2018-03-08 PROCEDURE — 25000128 H RX IP 250 OP 636

## 2018-03-08 PROCEDURE — 85018 HEMOGLOBIN: CPT | Performed by: ANESTHESIOLOGY

## 2018-03-08 PROCEDURE — 83735 ASSAY OF MAGNESIUM: CPT | Performed by: THORACIC SURGERY (CARDIOTHORACIC VASCULAR SURGERY)

## 2018-03-08 PROCEDURE — P9047 ALBUMIN (HUMAN), 25%, 50ML: HCPCS

## 2018-03-08 PROCEDURE — 40000809 ZZH STATISTIC NO DOCUMENTATION TO SUPPORT CHARGE

## 2018-03-08 PROCEDURE — 20000004 ZZH R&B ICU UMMC

## 2018-03-08 PROCEDURE — 84460 ALANINE AMINO (ALT) (SGPT): CPT | Performed by: THORACIC SURGERY (CARDIOTHORACIC VASCULAR SURGERY)

## 2018-03-08 PROCEDURE — 25000132 ZZH RX MED GY IP 250 OP 250 PS 637: Performed by: THORACIC SURGERY (CARDIOTHORACIC VASCULAR SURGERY)

## 2018-03-08 PROCEDURE — 71250 CT THORAX DX C-: CPT

## 2018-03-08 PROCEDURE — 85520 HEPARIN ASSAY: CPT | Performed by: THORACIC SURGERY (CARDIOTHORACIC VASCULAR SURGERY)

## 2018-03-08 PROCEDURE — 25000132 ZZH RX MED GY IP 250 OP 250 PS 637: Performed by: SURGERY

## 2018-03-08 PROCEDURE — 83935 ASSAY OF URINE OSMOLALITY: CPT | Performed by: ANESTHESIOLOGY

## 2018-03-08 PROCEDURE — 83051 HEMOGLOBIN PLASMA: CPT | Performed by: THORACIC SURGERY (CARDIOTHORACIC VASCULAR SURGERY)

## 2018-03-08 PROCEDURE — 97530 THERAPEUTIC ACTIVITIES: CPT | Mod: GP

## 2018-03-08 PROCEDURE — 87493 C DIFF AMPLIFIED PROBE: CPT | Performed by: ANESTHESIOLOGY

## 2018-03-08 PROCEDURE — 25000132 ZZH RX MED GY IP 250 OP 250 PS 637: Performed by: PHYSICIAN ASSISTANT

## 2018-03-08 PROCEDURE — P9047 ALBUMIN (HUMAN), 25%, 50ML: HCPCS | Performed by: STUDENT IN AN ORGANIZED HEALTH CARE EDUCATION/TRAINING PROGRAM

## 2018-03-08 PROCEDURE — 99291 CRITICAL CARE FIRST HOUR: CPT | Mod: GC | Performed by: ANESTHESIOLOGY

## 2018-03-08 PROCEDURE — 85379 FIBRIN DEGRADATION QUANT: CPT | Performed by: THORACIC SURGERY (CARDIOTHORACIC VASCULAR SURGERY)

## 2018-03-08 PROCEDURE — 00000146 ZZHCL STATISTIC GLUCOSE BY METER IP

## 2018-03-08 PROCEDURE — 85520 HEPARIN ASSAY: CPT | Performed by: SURGERY

## 2018-03-08 PROCEDURE — 85025 COMPLETE CBC W/AUTO DIFF WBC: CPT | Performed by: THORACIC SURGERY (CARDIOTHORACIC VASCULAR SURGERY)

## 2018-03-08 PROCEDURE — 40000193 ZZH STATISTIC PT WARD VISIT

## 2018-03-08 PROCEDURE — 25000132 ZZH RX MED GY IP 250 OP 250 PS 637: Performed by: STUDENT IN AN ORGANIZED HEALTH CARE EDUCATION/TRAINING PROGRAM

## 2018-03-08 PROCEDURE — 85300 ANTITHROMBIN III ACTIVITY: CPT | Performed by: THORACIC SURGERY (CARDIOTHORACIC VASCULAR SURGERY)

## 2018-03-08 PROCEDURE — 97602 WOUND(S) CARE NON-SELECTIVE: CPT

## 2018-03-08 PROCEDURE — 25000128 H RX IP 250 OP 636: Performed by: STUDENT IN AN ORGANIZED HEALTH CARE EDUCATION/TRAINING PROGRAM

## 2018-03-08 PROCEDURE — 97110 THERAPEUTIC EXERCISES: CPT | Mod: GP

## 2018-03-08 PROCEDURE — 80069 RENAL FUNCTION PANEL: CPT | Performed by: THORACIC SURGERY (CARDIOTHORACIC VASCULAR SURGERY)

## 2018-03-08 PROCEDURE — 25000131 ZZH RX MED GY IP 250 OP 636 PS 637: Performed by: THORACIC SURGERY (CARDIOTHORACIC VASCULAR SURGERY)

## 2018-03-08 PROCEDURE — 25800025 ZZH RX 258: Performed by: THORACIC SURGERY (CARDIOTHORACIC VASCULAR SURGERY)

## 2018-03-08 PROCEDURE — 71045 X-RAY EXAM CHEST 1 VIEW: CPT

## 2018-03-08 PROCEDURE — G0463 HOSPITAL OUTPT CLINIC VISIT: HCPCS | Mod: 25

## 2018-03-08 PROCEDURE — 25000128 H RX IP 250 OP 636: Performed by: SURGERY

## 2018-03-08 PROCEDURE — 93306 TTE W/DOPPLER COMPLETE: CPT

## 2018-03-08 PROCEDURE — 94799 UNLISTED PULMONARY SVC/PX: CPT

## 2018-03-08 PROCEDURE — 40000986 XR ABDOMEN PORT 1 VW

## 2018-03-08 PROCEDURE — 80197 ASSAY OF TACROLIMUS: CPT | Performed by: STUDENT IN AN ORGANIZED HEALTH CARE EDUCATION/TRAINING PROGRAM

## 2018-03-08 PROCEDURE — 85520 HEPARIN ASSAY: CPT | Performed by: PHYSICIAN ASSISTANT

## 2018-03-08 PROCEDURE — 25000132 ZZH RX MED GY IP 250 OP 250 PS 637: Performed by: ANESTHESIOLOGY

## 2018-03-08 PROCEDURE — P9016 RBC LEUKOCYTES REDUCED: HCPCS | Performed by: SURGERY

## 2018-03-08 PROCEDURE — 25000125 ZZHC RX 250: Performed by: THORACIC SURGERY (CARDIOTHORACIC VASCULAR SURGERY)

## 2018-03-08 PROCEDURE — 97161 PT EVAL LOW COMPLEX 20 MIN: CPT | Mod: GP

## 2018-03-08 PROCEDURE — 25800025 ZZH RX 258: Performed by: PHYSICIAN ASSISTANT

## 2018-03-08 PROCEDURE — 27210429 ZZH NUTRITION PRODUCT INTERMEDIATE LITER

## 2018-03-08 PROCEDURE — 93306 TTE W/DOPPLER COMPLETE: CPT | Mod: 26 | Performed by: INTERNAL MEDICINE

## 2018-03-08 PROCEDURE — 40000275 ZZH STATISTIC RCP TIME EA 10 MIN

## 2018-03-08 RX ORDER — ALBUMIN (HUMAN) 12.5 G/50ML
SOLUTION INTRAVENOUS
Status: COMPLETED
Start: 2018-03-08 | End: 2018-03-08

## 2018-03-08 RX ORDER — ALBUMIN (HUMAN) 12.5 G/50ML
12.5 SOLUTION INTRAVENOUS ONCE
Status: COMPLETED | OUTPATIENT
Start: 2018-03-08 | End: 2018-03-08

## 2018-03-08 RX ORDER — ZINC OXIDE
OINTMENT (GRAM) TOPICAL 2 TIMES DAILY
Status: DISCONTINUED | OUTPATIENT
Start: 2018-03-08 | End: 2018-03-12

## 2018-03-08 RX ORDER — MULTIPLE VITAMINS W/ MINERALS TAB 9MG-400MCG
1 TAB ORAL DAILY
Status: DISCONTINUED | OUTPATIENT
Start: 2018-03-09 | End: 2018-03-14

## 2018-03-08 RX ORDER — FUROSEMIDE 10 MG/ML
40 INJECTION INTRAMUSCULAR; INTRAVENOUS 3 TIMES DAILY
Status: DISCONTINUED | OUTPATIENT
Start: 2018-03-08 | End: 2018-03-09

## 2018-03-08 RX ORDER — ALBUMIN (HUMAN) 12.5 G/50ML
25 SOLUTION INTRAVENOUS EVERY 8 HOURS
Status: COMPLETED | OUTPATIENT
Start: 2018-03-08 | End: 2018-03-09

## 2018-03-08 RX ADMIN — POTASSIUM CHLORIDE 20 MEQ: 1.5 POWDER, FOR SOLUTION ORAL at 05:10

## 2018-03-08 RX ADMIN — ACETAMINOPHEN 650 MG: 325 TABLET, FILM COATED ORAL at 00:52

## 2018-03-08 RX ADMIN — DEXTROSE MONOHYDRATE: 100 INJECTION, SOLUTION INTRAVENOUS at 10:41

## 2018-03-08 RX ADMIN — NYSTATIN 1000000 UNITS: 500000 SUSPENSION ORAL at 20:10

## 2018-03-08 RX ADMIN — MYCOPHENOLATE MOFETIL 1500 MG: 200 POWDER, FOR SUSPENSION ORAL at 17:42

## 2018-03-08 RX ADMIN — DEXTROSE MONOHYDRATE 50 ML: 25 INJECTION, SOLUTION INTRAVENOUS at 09:09

## 2018-03-08 RX ADMIN — HUMAN INSULIN 1.5 UNITS/HR: 100 INJECTION, SOLUTION SUBCUTANEOUS at 14:09

## 2018-03-08 RX ADMIN — QUETIAPINE 50 MG: 50 TABLET ORAL at 22:20

## 2018-03-08 RX ADMIN — DEXTROSE MONOHYDRATE 4.5 G: 5 INJECTION, SOLUTION INTRAVENOUS at 10:49

## 2018-03-08 RX ADMIN — SILDENAFIL CITRATE 10 MG: 10 POWDER, FOR SUSPENSION ORAL at 01:55

## 2018-03-08 RX ADMIN — NYSTATIN 1000000 UNITS: 500000 SUSPENSION ORAL at 12:01

## 2018-03-08 RX ADMIN — ACETAMINOPHEN 650 MG: 325 TABLET, FILM COATED ORAL at 20:11

## 2018-03-08 RX ADMIN — OXYCODONE HYDROCHLORIDE 5 MG: 5 TABLET ORAL at 00:52

## 2018-03-08 RX ADMIN — NYSTATIN 1000000 UNITS: 500000 SUSPENSION ORAL at 08:31

## 2018-03-08 RX ADMIN — ALBUMIN (HUMAN) 12.5 G: 12.5 SOLUTION INTRAVENOUS at 22:34

## 2018-03-08 RX ADMIN — ACETAMINOPHEN 650 MG: 325 TABLET, FILM COATED ORAL at 04:50

## 2018-03-08 RX ADMIN — ALBUMIN HUMAN 25 G: 0.25 SOLUTION INTRAVENOUS at 17:31

## 2018-03-08 RX ADMIN — OXYCODONE HYDROCHLORIDE 5 MG: 5 TABLET ORAL at 04:51

## 2018-03-08 RX ADMIN — NYSTATIN 1000000 UNITS: 500000 SUSPENSION ORAL at 16:36

## 2018-03-08 RX ADMIN — LEVALBUTEROL TARTRATE 2 PUFF: 45 AEROSOL, METERED ORAL at 04:52

## 2018-03-08 RX ADMIN — MULTIVITAMIN 15 ML: LIQUID ORAL at 08:34

## 2018-03-08 RX ADMIN — PREDNISOLONE SODIUM PHOSPHATE 12.9 MG: 15 SOLUTION ORAL at 08:34

## 2018-03-08 RX ADMIN — DEXTROSE MONOHYDRATE 4.5 G: 5 INJECTION, SOLUTION INTRAVENOUS at 22:20

## 2018-03-08 RX ADMIN — ALBUMIN HUMAN 25 G: 0.25 SOLUTION INTRAVENOUS at 10:59

## 2018-03-08 RX ADMIN — FUROSEMIDE 20 MG: 10 INJECTION, SOLUTION INTRAVENOUS at 09:28

## 2018-03-08 RX ADMIN — FUROSEMIDE 20 MG: 10 INJECTION, SOLUTION INTRAVENOUS at 14:40

## 2018-03-08 RX ADMIN — DEXTROSE MONOHYDRATE 4.5 G: 5 INJECTION, SOLUTION INTRAVENOUS at 16:44

## 2018-03-08 RX ADMIN — PREDNISOLONE SODIUM PHOSPHATE 12.9 MG: 15 SOLUTION ORAL at 20:13

## 2018-03-08 RX ADMIN — VANCOMYCIN HYDROCHLORIDE 1000 MG: 1 INJECTION, SOLUTION INTRAVENOUS at 20:10

## 2018-03-08 RX ADMIN — LEVALBUTEROL TARTRATE 2 PUFF: 45 AEROSOL, METERED ORAL at 16:36

## 2018-03-08 RX ADMIN — SULFAMETHOXAZOLE AND TRIMETHOPRIM 80 MG: 200; 40 SUSPENSION ORAL at 08:34

## 2018-03-08 RX ADMIN — PANTOPRAZOLE SODIUM 40 MG: 40 TABLET, DELAYED RELEASE ORAL at 08:34

## 2018-03-08 RX ADMIN — MYCOPHENOLATE MOFETIL 1500 MG: 200 POWDER, FOR SUSPENSION ORAL at 08:34

## 2018-03-08 RX ADMIN — DEXTROSE MONOHYDRATE 4.5 G: 5 INJECTION, SOLUTION INTRAVENOUS at 05:16

## 2018-03-08 RX ADMIN — METOPROLOL TARTRATE 12.5 MG: 25 TABLET, FILM COATED ORAL at 16:40

## 2018-03-08 RX ADMIN — LEVALBUTEROL TARTRATE 2 PUFF: 45 AEROSOL, METERED ORAL at 20:11

## 2018-03-08 RX ADMIN — FUROSEMIDE 40 MG: 10 INJECTION, SOLUTION INTRAVENOUS at 20:10

## 2018-03-08 NOTE — PROGRESS NOTES
I personally saw, examined and evaluated the patient. I agree with the above documentation, assessment and plan, as outlined in the Resident/Fellow/NP/PA's note.    Assessment: 57 year old female with PMH significant for dermatomyositis on immunosuppression, RA, ILD, and pulmonary hypertension, who was admitted for emergent lung transplant work-up on 2/21 for increasing shortness of breath and hypoxia. S/P emergent [facility] ECMO placement on 2/27/18. S/P bilateral lung transplant on 3/1.    Critical Care Diagnoses:        1. Vasoplegic Shock - worsening                                                  2. Cardiogenic Shock - worsening                                                  3. Atrial Fibrillation with Rapid Ventricular Response - improving                                                  4. Pulmonary HTN - stable                                                  5. Acute Right Ventricular Heart Failure - resolved                                                  6. Volume Overload with Intravascular Hypovolemia                                                  7. Diarrhea                                                  8. Abdominal Pain with Gastric Distention                                                                     9. Concern for Protein-Calorie Malnutrition                                                 10. Acute Renal Failure with Hyperchloremia - improving                11. Left Hemothorax  Plan:     1. Continue metoprolol 25mg BID  2. Obtain C. Diff studies  3. Obtain CT thorax/abdomen/pelvis  4. Wean off norepinephrine gtt  5. Obtain TTE today  6. D/C D5W infusion  7. Start 100mL of 25% Albumin Q8H  8. Goal fluid balance is -1L by tomorrow  9. D/C TFs secondary to abdominal distention  10. Place NG tube to decompress the stomach  11. Decrease heparin gtt to 400U/hr  12. Place arterial line if unable to wean off norepinephrine gtt      Total Critical Care Time: 46 minutes    Dr. Shree BECKHAM  MARTIN Grant.

## 2018-03-08 NOTE — PLAN OF CARE
Problem: Patient Care Overview  Goal: Plan of Care/Patient Progress Review  Outcome: Improving  D/I/A:  Neuro: A&Ox4, waxes and wanes, very forgetful. PERRL. Very weak in all extremities. Pt becomes very anxious when family not at bedside. Denies all pain. Up to chair x1 for 3hrs w/ lift.   CV: HR 100s-140s sinus tach/A-fib. Flipping in and out of A-fib frequently this afternoon, lytes checked and replaced. MDs aware, PO metoprolol dose increased. BPs intermittently soft but stable, 80s-90s/50s, MAPs 55-70s. 2x Chest tubes in place.   Pulm: RR mid 20s-30s, shallow breaths. Encouraging frequent IS use, not able to pull more than 150. Very weak cough, unable to clear secretions. Remains on 1L nasal cannula, O2 sats 96-98%. Lungs coarse/dim.  GI: TF at goal, rectal pouch in place with large amount of output. Speech cleared pt for full nectar thick liquid diet.  : Basilio in place, 60mg lasix given this shift, goal of net neg 1L to 1.5L.   Gtt: Heparin, Maint, Insulin  Skin: L arm has DVT, edematous. R and L hand dusky and cold, using warm packs. R and left lowers also dusky, pulses hard to doppler.   P: Continue to monitor, notify MDs of any issues.

## 2018-03-08 NOTE — PROGRESS NOTES
CV[facility] PROGRESS NOTE  03/08/2018    ASSESSMENT: Kecia Blue is a 57 year old female with PMH significant for dermatomyositis on immunosuppression, eronegative RA, ILD, and pulmonary hypertension, who was admitted for emergent lung transplant work-up on 2/21 for increasing shortness of breath and hypoxia now s/p emergent [facility] ECMO placement on 2/27/18. Now s/p bilateral lung transplant on 3/1 and [facility] ECMO decannulation on 3/3 with Dr. Hernandez.      TODAY'S PROGRESS/PLAN  - 1 PRBC for symptomatic anemia  - Wean NE as able  - Holding TFs  - Meds as tablets as able  - NG to LIS for gastric distention  - Check C diff  - Check drug levels to see if can go down on immunosuppressants  - CT CAP to eval for hypotension etiology  - TTE to eval function  - 25% albumin 25g q8h with lasix 20mg TID  - Stop D5W  - Goal I/O net negative 500-1000mL/24 hours     PLAN:  Neuro/ pain/ sedation:  # Postoperative acute pain  # Sedation for mechanical ventilation - extubated  - Monitor neurological status. Notify the MD for any acute changes in exam.  - PRN tylenol/oxycodone.     Pulmonary care:  # Acute on chronic respiratory failure, worsening, s/p bilateral lung transplantation 3/1/2018  # ILD related to dermatomyositis   # Severe pulmonary hypertension (WHO class III)  # Pneumomediastinum, 2/22/2018  # [facility] ECMO decannulated 3/3   - Supplemental oxygen to keep saturation above 92 %.  - S/p Alan and Flolan for RV support    Cardiovascular:  # Right heart failure  # Right ventricular hypertrophy  # Pulmonary HTN  # Postoperative atrial fibrillation with RVR - sinus between  - Monitor hemodynamic status.   - Start Metoprolol 12.5 TID->25mg BID  - Norepi started overnight, wean as able today    GI care:  #Constipation - having BMs  - NPO. OG.  - NJ placed  - Bowel regimen    Fluids/ Electrolytes/ Nutrition:  #Hypernatremia   - Advance TFs to goal  - ICU electrolyte protocol  - D5W at 50cc/hr  - FWF at 60cc/2 hours    Renal/ Fluid  Balance:  #Acute kidney injury  #Volume overload  - Urine output is adequate so far.  - Cr improving  - Diurese with lasix for net negative goal 500-750mL negative  - Will continue to monitor intake and output.    Endocrine:  #Stress hyperglycemia    - Insulin gtt     ID/ Antibiotics:  #Febrile  - Anti-rejection and immunosuppressant medications per transplant team  - Donor lungs with MRSA, coverage for 2 weeks with vanco/zosyn continued with increased fever  - Unasyn added for culture stopped after one dose to continue broad culture  - Pan culture 3/4 - NGTD  - Follow 3/6 BAL cultures    Heme:  #Acute blood loss anemia   #Acute LUE DVT on ultrasound   - Hemoglobin stable 7.1, transfused 1u PRBC 3/8  - Monitor Hgb and Plts, transfuse if Hgb < 7.   - High intensity dose heparin for DVT per surgery teams    Prophylaxis:    - Mechanical prophylaxis for DVT.   - Heparin SubQ TID.  - PPI    Lines/ tubes/ drains:  - LIJ MAC/swan, femoral arterial line, PIV, ETT, Basilio, Chest tubes x2    Disposition:  - CVICU.     Seen with Dr. Grant.    Kwesi Schwartz MD  Anesthesiology Resident CA2, PGY3      ====================================    TODAY'S PROGRESS:   SUBJECTIVE:   - Hypotension overnight treated with albumin and starting NE.  - Having some abd pain with BMs.  - Significant volume of loose stools.  - Hgb drifting and given 1 PRBC this AM.    OBJECTIVE:   1. VITAL SIGNS:   Temp:  [97.6  F (36.4  C)-99.8  F (37.7  C)] 99.3  F (37.4  C)  Heart Rate:  [] 122  Resp:  [24-34] 30  BP: ()/(33-77) 96/64  SpO2:  [89 %-100 %] 99 %  Resp: 30    2. INTAKE/ OUTPUT:   I/O last 3 completed shifts:  In: 4943.73 [P.O.:240; I.V.:2573.73; NG/GT:1210]  Out: 4285 [Urine:2585; Stool:1250; Chest Tube:450]    3. PHYSICAL EXAMINATION:   General: ALert, NAD  Neuro: Alert, NAD, moves extremities, follows commands  Resp: Course bilaterally, equal and clears with cough effort  CV: sinus, normocardic per tele. CTs with s/s  drainage.  Abdomen: Soft, mildly distended  Incisions: c/d/i  Extremities: warm, toes dusky bilaterally, cap refill < 2 seconds, +doppler signals bilateral PT. LUE with increased dusky color vs right side improved over previous    4. INVESTIGATIONS:   Arterial Blood Gases     Recent Labs  Lab 03/07/18  0354 03/06/18  2208 03/06/18  1905 03/06/18  1822   PH 7.47* 7.45 7.43 7.43   PCO2 35 36 36 36   PO2 113* 159* 156* 142*   HCO3 26 25 24 24     Complete Blood Count     Recent Labs  Lab 03/08/18  0403 03/07/18  1150 03/07/18  0354 03/06/18  0402   WBC 17.8* 16.7* 17.7* 18.3*   HGB 7.1* 7.8* 8.4* 8.2*    157 162 141*     Basic Metabolic Panel    Recent Labs  Lab 03/08/18  0403 03/07/18  1757 03/07/18  1150 03/07/18  0354 03/07/18  0010  03/06/18  0402     --   --  146* 146*  --  151*   POTASSIUM 3.9 4.1 4.0 3.8 4.3  < > 4.2   CHLORIDE 107  --   --  112* 114*  --  120*   CO2 24  --   --  25 23  --  20   BUN 48*  --   --  57* 63*  --  70*   CR 1.33*  --   --  1.40* 1.50*  --  1.65*   *  --   --  169* 208*  --  138*   < > = values in this interval not displayed.  Liver Function Tests    Recent Labs  Lab 03/08/18  0403 03/07/18  0354 03/06/18  0402 03/05/18  1648 03/05/18  1132 03/05/18  0842 03/05/18  0355  03/03/18  0329  03/01/18 2017   AST  --   --   --   --   --   --   --   --  34  --  38   ALT 28 39 42  --   --  40  --   < > 29  < > 65*   ALKPHOS  --   --   --   --   --   --   --   --  27*  --  35*   BILITOTAL  --   --   --   --   --   --   --   --  1.5*  --  1.2   ALBUMIN 1.8* 2.0* 2.1*  --   --   --  2.1*  < > 2.4*  < > 1.7*   INR  --   --   --  1.11 1.13 1.13 1.15*  < > 1.36*  < > 1.86*   < > = values in this interval not displayed.  Pancreatic Enzymes  No lab results found in last 7 days.  Coagulation Profile    Recent Labs  Lab 03/05/18  1648 03/05/18  1132 03/05/18  0842 03/05/18  0355   INR 1.11 1.13 1.13 1.15*   PTT 24 27 25 26     Lactate  Invalid input(s): LACTATE    5. RADIOLOGY:    Recent Results (from the past 24 hour(s))   US Upper Extremity Venous Duplex Left Portable   Result Value    Radiologist flags Deep venous thrombus (Urgent)    Narrative    EXAMINATION: US UPPER EXTREMITY VENOUS DUPLEX LEFT PORTABLE, 3/7/2018  10:27 AM     COMPARISON: None.    HISTORY: LEFT UPPER EXTREMITY US FOR CONCERN FOR DVT, LUE swelling;     TECHNIQUE:  Gray-scale evaluation with compression, spectral flow and  color Doppler assessment of the deep venous system of the left upper  extremity.    FINDINGS:  Left: There is normal compressibility of the brachial veins veins.    Left internal jugular vein and innominate vein obscured by bandage.    Occlusive thrombus in the left subclavian vein, axillary vein, basilic  vein in the upper arm, cephalic vein near junction with the subclavian  vein and cephalic vein from mid arm to the wrist.      Impression    IMPRESSION:  Occlusive thrombus in the left subclavian vein, axillary vein, basilic  vein in the upper arm, cephalic vein near junction with the subclavian  vein and cephalic vein from mid arm to the wrist.     [Urgent Result: Deep venous thrombus]    Finding was identified on 3/7/2018 10:34 AM.     Dr. Schwartz was contacted by Dr. Trino Rich at 3/7/2018 10:38 AM  and verbalized understanding of the urgent finding.      I have personally reviewed the examination and initial interpretation  and I agree with the findings.    BENJAMÍN SANTAMARIA MD       =========================================

## 2018-03-08 NOTE — PROGRESS NOTES
CVTS PROGRESS NOTE  03/08/2018    ASSESSMENT: Kecia Blue is a 57 year old female with PMH significant for dermatomyositis on immunosuppression, eronegative RA, ILD, and pulmonary hypertension, who was admitted for emergent lung transplant work-up on 2/21 for increasing shortness of breath and hypoxia now s/p emergent [facility] ECMO placement on 2/27/18. Now s/p bilateral lung transplant on 3/1 and [facility] ECMO decannulation on 3/3 with Dr. Hernandez.      TODAY'S PROGRESS/PLAN  - 1 PRBC for symptomatic anemia  - Wean NE as able  - Holding TFs  - Meds as tablets as able  - NG to LIS for gastric distention  - Check C diff  - Check drug levels to see if can go down on immunosuppressants  - CT CAP to eval for hypotension etiology  - TTE to eval function  - 25% albumin 25g q8h with lasix 20mg TID  - Stop D5W  - Goal I/O net negative 500-1000mL/24 hours     PLAN:  Neuro/ pain/ sedation:  # Postoperative acute pain  # Sedation for mechanical ventilation - extubated  - Monitor neurological status. Notify the MD for any acute changes in exam.  - PRN tylenol/oxycodone.     Pulmonary care:  # Acute on chronic respiratory failure, worsening, s/p bilateral lung transplantation 3/1/2018  # ILD related to dermatomyositis   # Severe pulmonary hypertension (WHO class III)  # Pneumomediastinum, 2/22/2018  # [facility] ECMO decannulated 3/3   - Supplemental oxygen to keep saturation above 92 %.  - S/p Alan and Flolan for RV support    Cardiovascular:  # Right heart failure  # Right ventricular hypertrophy  # Pulmonary HTN  # Postoperative atrial fibrillation with RVR - sinus between  - Monitor hemodynamic status.   - Start Metoprolol 12.5 TID->25mg BID  - Norepi started overnight, wean as able today    GI care:  #Constipation - having BMs  - NPO. OG.  - NJ placed  - Bowel regimen    Fluids/ Electrolytes/ Nutrition:  #Hypernatremia   - Advance TFs to goal  - ICU electrolyte protocol  - D5W at 50cc/hr  - FWF at 60cc/2 hours    Renal/ Fluid  Balance:  #Acute kidney injury  #Volume overload  - Urine output is adequate so far.  - Cr improving  - Diurese with lasix for net negative goal 500-750mL negative  - Will continue to monitor intake and output.    Endocrine:  #Stress hyperglycemia    - Insulin gtt     ID/ Antibiotics:  #Febrile  - Anti-rejection and immunosuppressant medications per transplant team  - Donor lungs with MRSA, coverage for 2 weeks with vanco/zosyn continued with increased fever  - Unasyn added for culture stopped after one dose to continue broad culture  - Pan culture 3/4 - NGTD  - Follow 3/6 BAL cultures    Heme:  #Acute blood loss anemia   #Acute LUE DVT on ultrasound   - Hemoglobin stable 7.1, transfused 1u PRBC 3/8  - Monitor Hgb and Plts, transfuse if Hgb < 7.   - High intensity dose heparin for DVT per surgery teams    Prophylaxis:    - Mechanical prophylaxis for DVT.   - Heparin SubQ TID.  - PPI    Lines/ tubes/ drains:  - LIJ MAC/swan, femoral arterial line, PIV, ETT, Basilio, Chest tubes x2    Disposition:  - CVICU.     Seen with Dr. Marvin.    Kwesi Schwartz MD  Anesthesiology Resident CA2, PGY3      ====================================    TODAY'S PROGRESS:   SUBJECTIVE:   - Hypotension overnight treated with albumin and starting NE.  - Having some abd pain with BMs.  - Significant volume of loose stools.  - Hgb drifting and given 1 PRBC this AM.    OBJECTIVE:   1. VITAL SIGNS:   Temp:  [97.6  F (36.4  C)-99.8  F (37.7  C)] 98.6  F (37  C)  Heart Rate:  [] 112  Resp:  [28-34] 29  BP: ()/(33-81) 104/80  SpO2:  [94 %-100 %] 98 %  Resp: 29    2. INTAKE/ OUTPUT:   I/O last 3 completed shifts:  In: 3732.83 [I.V.:2042.83; NG/GT:870]  Out: 3100 [Urine:2200; Stool:350; Chest Tube:550]    3. PHYSICAL EXAMINATION:   General: ALert, NAD  Neuro: Alert, NAD, moves extremities, follows commands  Resp: Course bilaterally, equal and clears with cough effort  CV: sinus, normocardic per tele. CTs with s/s drainage.  Abdomen: Soft,  mildly distended  Incisions: c/d/i  Extremities: warm, toes dusky bilaterally, cap refill < 2 seconds, +doppler signals bilateral PT. LUE with increased dusky color vs right side improved over previous    4. INVESTIGATIONS:   Arterial Blood Gases     Recent Labs  Lab 03/07/18  0354 03/06/18  2208 03/06/18  1905 03/06/18  1822   PH 7.47* 7.45 7.43 7.43   PCO2 35 36 36 36   PO2 113* 159* 156* 142*   HCO3 26 25 24 24     Complete Blood Count     Recent Labs  Lab 03/08/18  0403 03/07/18  1150 03/07/18  0354 03/06/18  0402   WBC 17.8* 16.7* 17.7* 18.3*   HGB 7.1* 7.8* 8.4* 8.2*    157 162 141*     Basic Metabolic Panel    Recent Labs  Lab 03/08/18  0403 03/07/18  1757 03/07/18  1150 03/07/18  0354 03/07/18  0010  03/06/18  0402     --   --  146* 146*  --  151*   POTASSIUM 3.9 4.1 4.0 3.8 4.3  < > 4.2   CHLORIDE 107  --   --  112* 114*  --  120*   CO2 24  --   --  25 23  --  20   BUN 48*  --   --  57* 63*  --  70*   CR 1.33*  --   --  1.40* 1.50*  --  1.65*   *  --   --  169* 208*  --  138*   < > = values in this interval not displayed.  Liver Function Tests    Recent Labs  Lab 03/08/18 0403 03/07/18  0354 03/06/18  0402 03/05/18  1648 03/05/18  1132 03/05/18  0842 03/05/18  0355  03/03/18  0329  03/01/18 2017   AST  --   --   --   --   --   --   --   --  34  --  38   ALT 28 39 42  --   --  40  --   < > 29  < > 65*   ALKPHOS  --   --   --   --   --   --   --   --  27*  --  35*   BILITOTAL  --   --   --   --   --   --   --   --  1.5*  --  1.2   ALBUMIN 1.8* 2.0* 2.1*  --   --   --  2.1*  < > 2.4*  < > 1.7*   INR  --   --   --  1.11 1.13 1.13 1.15*  < > 1.36*  < > 1.86*   < > = values in this interval not displayed.  Pancreatic Enzymes  No lab results found in last 7 days.  Coagulation Profile    Recent Labs  Lab 03/05/18  1648 03/05/18  1132 03/05/18  0842 03/05/18  0355   INR 1.11 1.13 1.13 1.15*   PTT 24 27 25 26     Lactate  Invalid input(s): LACTATE    5. RADIOLOGY:   Recent Results (from the past  24 hour(s))   XR Chest Port 1 View    Narrative    Exam:  Chest X-ray 3/8/2018 8:11 AM    History: Recent lung transplant, monitoring progression;     Comparison: Chest x-ray 3/7/2018    Findings: Portable AP radiograph of the chest at 30 degrees.  Postsurgical change bilateral lung transplantation. Interval removal  of the left IJ Holt-Supriya catheter. New left IJ central venous catheter  with tip projecting over the upper SVC. Enteric tube courses below the  diaphragm. Bilateral chest tubes, stable in position. The trachea is  midline. The cardiomediastinal silhouette is normal in size. Pulmonary  vascular congestion. No pleural effusion or pneumothorax. Bilateral  perihilar and left retrocardiac opacities. Mild gaseous distention of  the stomach. Air-filled loops of bowel project over the upper abdomen.      Impression    Impression:   1. Stable postsurgical changes of bilateral lung transplantation, with  bilateral chest tubes in stable position. No pneumothorax.  2. Bilateral perihilar opacities, likely represent pulmonary vascular  congestion.  3. Left retrocardiac opacities, atelectasis or edema versus infection.    I have personally reviewed the examination and initial interpretation  and I agree with the findings.    ADAM GONZALEZ MD   CT Chest Abdomen Pelvis w/o Contrast   Result Value    Radiologist flags Left hemothorax (Urgent)    Narrative    EXAMINATION: CT CHEST ABDOMEN PELVIS W/O CONTRAST, 3/8/2018 9:09 AM    TECHNIQUE:  Helical CT images from the thoracic inlet through the  symphysis pubis were obtained  without contrast.    COMPARISON: Same-day chest x-ray. CT chest 2/22/2018.    HISTORY: Hypotension of unclear etiology. Status post bilateral lung  transplant on 3/1/2018 for acute hypoxic/hypercapnic respiratory  failure secondary to interstitial lung disease.    FINDINGS:    Chest:    Post surgical changes of bilateral lung transplant. Median sternotomy  wires seen which apparent intact. Left IJ  central venous catheter tip  is located at the innominate confluence.Bilateral pleural drains from  subxiphoid approach, with introduced air into the associated  subcutaneous tissues and small bilateral anterior pneumothoraces.    Marked decompression of both atria, as well as significant  decompression of the right ventricle. Hypoattenuation of the blood  pool relative to the myocardium and walls of the aorta, compatible  with anemia. Mild aortic arch calcifications. Mild scattered blood  products and foci of gas noted throughout the mediastinum. There is a  small to moderate left-sided pleural effusion containing  heterogeneous, hyperdense material compatible with blood products;  cannot exclude active extravasation this unenhanced examination. Small  loculation of pleural fluid over the lateral pleural space. Simple  fluid attenuating small to moderate right pleural effusion. Mild  angulation and narrowing at the left pulmonary arterial anastomosis.  Airway anastomoses are patent though there is mild/moderate narrowing  on the right (series 2, image 46 and series 5, image 54). Bibasilar  atelectasis. Normal caliber great vessels. Extensive bibasilar  compressive atelectasis. Trace gas in both pleural spaces.  Incidentally noted azygos fissure.    Abdomen and pelvis:     Feeding tube tip is located in the second portion of the duodenum.    Interposition of large bowel loops between the liver and diaphragm on  the right. Otherwise no abnormally dilated loops of bowel. Tiny  calcified gallstones. Normal appearance of the liver, spleen, adrenal  glands, kidneys, and pancreas. Decompressed urinary bladder drained by  Basilio catheter with small amount of air, likely iatrogenically  introduced. Small amount of dense material in the urinary bladder,  dependently on the right, potentially consolidation/stone material.  Small free fluid in the pelvis. Small fibroid anteriorly at the  uterine fundus. Uterus otherwise appears  unremarkable. No adnexal  mass. Periuterine vascular calcifications. Abdominal aortic aneurysm.  No bowel obstruction.    Bones and soft tissues:   Bones are osteopenic. Degenerative changes of the spine. Mediastinal  wires which otherwise appear unremarkable. No visualized retrosternal  hematoma. Diffuse anasarca.      Impression    Impression:  1. Postoperative changes of bilateral lung presentation with tiny  anterior bilateral pneumothoraces and minimal scattered foci of  pneumomediastinum, likely post surgical.  2. Small to moderate left pleural effusion containing hyperdense blood  products; cannot exclude active extravasation on this unenhanced  examination. Small simple appearing right pleural effusion. Associated  atelectasis bilaterally.  3. Significant biatrial decompression indicative of hypovolemia. No  significant pericardial fluid to suggest constrictive physiology,  although mild blood products are noted in the mediastinum, potentially  postsurgical.  4. Mild free fluid in the pelvis, otherwise no acute abdominal  findings.  5. Additional incidental findings as described above.    [Urgent Result: Left hemothorax]    Finding was identified on 3/8/2018 9:12 AM.     Dr. Schwartz was contacted by Dr. Gao at 3/8/2018 9:22 AM and  verbalized understanding of the urgent finding.     I have personally reviewed the examination and initial interpretation  and I agree with the findings.    RAUL ESCOBAR MD   XR Abdomen Port 1 View    Narrative    Study: XR ABDOMEN PORT 1 VW 3/8/2018 12:21 PM    Comparison: CT images from 3/8/2018.    History: Verify small bowel feeding tube bedside placement;     Findings:   Portable supine radiograph of the abdomen was obtained and reviewed.  Portion of lower abdomen collimated out of view. Feeding tube tip  projects over the junction of the second and third portion the  duodenum. OG tube tip and sidehole project over the stomach. Bilateral  chest tubes, left IJ central  venous line with tip at the junction of  the left innominate and SVC. Surgical clips the mediastinum and right  upper lung field. Air-filled loops of nondistended small and large  bowel. No pneumatosis. No portal venous gas. Mediastinal prominence.  Bibasilar atelectasis.      Impression    Impression: Feeding tube tip projects at the junction of the second  and third portion of duodenum.    I have personally reviewed the examination and initial interpretation  and I agree with the findings.    CHAUNCEY (Geovany) MD [facility]       =========================================

## 2018-03-08 NOTE — PLAN OF CARE
Problem: Patient Care Overview  Goal: Plan of Care/Patient Progress Review  SLP 4E: Cx. Pt currently NPO. Not appropriate for PO intake.

## 2018-03-08 NOTE — PLAN OF CARE
Problem: Patient Care Overview  Goal: Plan of Care/Patient Progress Review  Outcome: No Change  D: ILD s/p [facility] Lung transplant.  I/A: Pt. A&O x4 with intermittent confusion. Afebrile. Pt went into Afib with RVR with rates into the 180's. 5mg Metolprolol given per primary team. Pt responded with lowered rate afib. 90's to 130's no RVR. Evening Lasix held per primary team. BP soft with MAPs in the 50's. 250mL Albumin given. BP still soft. Levophed started. Keep SBP >90 and MAP> 60. RR 20-30. O2sat mid to high 90's on 2Lper NC. BBS Clear/diminished. Very weak cough. Pulmonary hygiene promoted. TF continue at goal. Tolerating well. UOP adequate per Basilio.   Plan: Continue plan of care. Continue to titrate heparin, insulin and levophed GTT's. Continue anti-rejection regimen.

## 2018-03-08 NOTE — PROGRESS NOTES
[facility]  WO Nurse Inpatient Pressure Injury Assessment       Pressure Injury Present: Yes, bridge of nose (see below) Stage 2. Resurfaced on assessment 3/8/19  Support Surface:  Low air loss mattress Isolibrium         Patient History: Per MD Note:56 yo lady with acute on chronic hypoxic respiratory failure from ILD S/p V-A ECMO for R heart failure on 18 now s/p BLST on 3/1/18     Current Diet/Nutrition:   Active Diet Order      Full Liquid Diet Nectar Thickened Liquids (pre-thickened or use instant food thickener)    Marcus: Marcus Score  Av.5  Min: 11  Max: 20     Labs:   Recent Labs   Lab Test  18   0842  18   0355   18   ALBUMIN   --   2.1*   < >  1.7*   HGB  8.2*  8.1*   < >  9.6*   INR  1.13  1.15*   < >  1.86*   WBC  18.9*  18.5*   < >  11.3*   A1C   --    --    --   5.5    < > = values in this interval not displayed.       Pressure Injury: on bridge of nose  Hospital acquired   This is a Medical Device Related Pressure Injury (MDRPI) due to BiPAP mask  Pressure Injury is Stage 2   Status: resurfaced     Physical Exam  Wound Location:  Bridge of nose   Wound History: pt with BiPAP mask prior to ECMO cannulation.  Wound noted when mask removed   Measurements (length x width x depth, in cm) 0.6 cm x 0.3 cm  x  0.1 cm   Wound Base:  100 % thin red fibrinous scab easily washes away to reveal healed pink epidermis     Plan of Care for: nose  Pressure source removed, no intervention necessary unless BiPAP resumed    WOC to follow up: will sign off today. Please reconsult with questions or concerns.    Face to Face Time: 5 minutes

## 2018-03-08 NOTE — PLAN OF CARE
Problem: Patient Care Overview  Goal: Plan of Care/Patient Progress Review  PT / 4E -     Discharge Planner PT   Patient plan for discharge: pt would like to return home  Current status: PT evaluation completed, treatment initiated.  Reviewed sternal precautions and implications for functional mobility. Pt willing to trial sitting edge of chair with Mod A at trunk however declining transfers this afternoon.  Engaged in seated UE/LE exercises.   Barriers to return to prior living situation: Level of assist, post-op precautions, strength, activity tolerance  Recommendations for discharge: TCU however pending continued progression and activity tolerance pt may be appropriate for ARU  Rationale for recommendations: Will continue to monitor and update recommendation as able.        Entered by: Rona Ling 03/08/2018 1:46 PM

## 2018-03-08 NOTE — PROGRESS NOTES
03/08/18 1338   Quick Adds   Type of Visit Initial PT Evaluation       Present no   Language English   Living Environment   Lives With spouse   Living Arrangements house   Home Accessibility stairs to enter home   Number of Stairs to Enter Home 3   Number of Stairs Within Home 0   Stair Railings at Home outside, present at both sides   Transportation Available car;family or friend will provide   Living Environment Comment Pt lives in farmhouse with .  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   Self-Care   Usual Activity Tolerance fair   Current Activity Tolerance poor   Regular Exercise no   Functional Level Prior   Ambulation 0-->independent   Transferring 0-->independent   Toileting 0-->independent   Bathing 0-->independent   Dressing 0-->independent   Eating 0-->independent   Communication 0-->understands/communicates without difficulty   Swallowing 0-->swallows foods/liquids without difficulty   Cognition 0 - no cognition issues reported   Fall history within last six months no   Which of the above functional risks had a recent onset or change? ambulation;transferring   Prior Functional Level Comment Pt reports being previously IND with all ADLs and amb with short distances, pt uses scooter for any community amb.    General Information   Onset of Illness/Injury or Date of Surgery - Date 02/21/18   Referring Physician Kwesi Schwartz MD   Patient/Family Goals Statement Return home   Pertinent History of Current Problem (include personal factors and/or comorbidities that impact the POC) Kecia Blue is a 57 year old female with history of dermatomyositis on immunosuppression, eronegative RA, ILD, and pulmonary hypertension, who was admitted for emergent lung transplant work-up on 2/21 after being admitted for increasing shortness of breath and hypoxia. S/p emergent V-A ECMO on 2/27 for right hear failure.     Precautions/Limitations sternal precautions  (thorocotomy precautions)  "  Weight-Bearing Status - LUE nonweight-bearing   Weight-Bearing Status - RUE nonweight-bearing   Weight-Bearing Status - LLE full weight-bearing   Weight-Bearing Status - RLE full weight-bearing   General Info Comments Activity: progress act as tolerated   Cognitive Status Examination   Level of Consciousness alert   Follows Commands and Answers Questions 100% of the time   Personal Safety and Judgment intact   Pain Assessment   Patient Currently in Pain No   Integumentary/Edema   Integumentary/Edema Comments Incision noted on sternum; B CT present   Posture    Posture Forward head position;Protracted shoulders   Range of Motion (ROM)   ROM Comment B LE WNL/WFL   Strength   Strength Comments B LE 3/5    Bed Mobility   Bed Mobility Comments Not Assessed   Transfer Skills   Transfer Comments RN used ceiling lift for transfer to chair   Sensory Examination   Sensory Perception Comments Reports tingling into LUE - pt reports DVT   Clinical Impression   Criteria for Skilled Therapeutic Intervention yes, treatment indicated   PT Diagnosis Impaired functional mobility   Influenced by the following impairments Post-op precautions, strength, supplemental O2   Functional limitations due to impairments Impaired bed mobility, transfers, gait, stairs   Clinical Presentation Stable/Uncomplicated   Clinical Presentation Rationale clinical judgement   Clinical Decision Making (Complexity) Low complexity   Therapy Frequency` other (see comments)  (6x/week)   Predicted Duration of Therapy Intervention (days/wks) 3/24/18   Anticipated Discharge Disposition Transitional Care Facility;Home with Outpatient Therapy   Risk & Benefits of therapy have been explained Yes   Patient, Family & other staff in agreement with plan of care Yes   [facility] AM-PAC TM \"6 Clicks\"   2016, Trustees of [facility], under license to [employer].  All rights reserved.   6 Clicks Short Forms Basic Mobility Inpatient Short Form   [place] " "[facility]  \"6 Clicks\" V.2 Basic Mobility Inpatient Short Form   1. Turning from your back to your side while in a flat bed without using bedrails? 2 - A Lot   2. Moving from lying on your back to sitting on the side of a flat bed without using bedrails? 2 - A Lot   3. Moving to and from a bed to a chair (including a wheelchair)? 2 - A Lot   4. Standing up from a chair using your arms (e.g., wheelchair, or bedside chair)? 2 - A Lot   5. To walk in hospital room? 2 - A Lot   6. Climbing 3-5 steps with a railing? 1 - Total   Basic Mobility Raw Score (Score out of 24.Lower scores equate to lower levels of function) 11   Total Evaluation Time   Total Evaluation Time (Minutes) 5     "

## 2018-03-08 NOTE — PROGRESS NOTES
CLINICAL NUTRITION SERVICES - brief note. See 3/7 note for full assessment.    -FEN/GI: Receiving TFs @ goal 45 mL/hr + 60 mL q2h free water flushes. Pt had 2250 mL stool output yesterday. AXR findings w/ gastric distention, air-filled colon.     Interventions  Collaboration with other providers - Discussed plan for FEN/GI in rounds. RN to place NG for decompression, TF to be held and will monitor GI status. PharmD to see if liquid meds can be changed to tablet. Checking C diff.   Enteral Nutrition - Ordered to hold TF today.   Multivitamin/mineral - changed certavite to thera-vit-m     RD will continue to follow.    Natalia Sargent, RD, LD, CNSC  CVICU Dietitian  Pager: [phone]

## 2018-03-09 ENCOUNTER — APPOINTMENT (OUTPATIENT)
Dept: OCCUPATIONAL THERAPY | Facility: CLINIC | Age: 58
DRG: 003 | End: 2018-03-09
Attending: INTERNAL MEDICINE
Payer: COMMERCIAL

## 2018-03-09 ENCOUNTER — APPOINTMENT (OUTPATIENT)
Dept: GENERAL RADIOLOGY | Facility: CLINIC | Age: 58
DRG: 003 | End: 2018-03-09
Attending: INTERNAL MEDICINE
Payer: COMMERCIAL

## 2018-03-09 ENCOUNTER — APPOINTMENT (OUTPATIENT)
Dept: PHYSICAL THERAPY | Facility: CLINIC | Age: 58
DRG: 003 | End: 2018-03-09
Attending: INTERNAL MEDICINE
Payer: COMMERCIAL

## 2018-03-09 ENCOUNTER — APPOINTMENT (OUTPATIENT)
Dept: SPEECH THERAPY | Facility: CLINIC | Age: 58
DRG: 003 | End: 2018-03-09
Attending: INTERNAL MEDICINE
Payer: COMMERCIAL

## 2018-03-09 LAB
ALBUMIN SERPL-MCNC: 3.1 G/DL (ref 3.4–5)
ALT SERPL W P-5'-P-CCNC: 21 U/L (ref 0–50)
ANION GAP SERPL CALCULATED.3IONS-SCNC: 10 MMOL/L (ref 3–14)
AT III ACT/NOR PPP CHRO: 71 % (ref 85–135)
BACTERIA SPEC CULT: NO GROWTH
BASOPHILS # BLD AUTO: 0 10E9/L (ref 0–0.2)
BASOPHILS NFR BLD AUTO: 0 %
BUN SERPL-MCNC: 42 MG/DL (ref 7–30)
CALCIUM SERPL-MCNC: 7.8 MG/DL (ref 8.5–10.1)
CHLORIDE SERPL-SCNC: 102 MMOL/L (ref 94–109)
CO2 SERPL-SCNC: 27 MMOL/L (ref 20–32)
CREAT SERPL-MCNC: 1.41 MG/DL (ref 0.52–1.04)
D DIMER PPP FEU-MCNC: 1.5 UG/ML FEU (ref 0–0.5)
D DIMER PPP FEU-MCNC: 2.1 UG/ML FEU (ref 0–0.5)
DIFFERENTIAL METHOD BLD: ABNORMAL
EOSINOPHIL # BLD AUTO: 0.2 10E9/L (ref 0–0.7)
EOSINOPHIL NFR BLD AUTO: 1 %
ERYTHROCYTE [DISTWIDTH] IN BLOOD BY AUTOMATED COUNT: 16.9 % (ref 10–15)
GFR SERPL CREATININE-BSD FRML MDRD: 39 ML/MIN/1.7M2
GLUCOSE BLDC GLUCOMTR-MCNC: 112 MG/DL (ref 70–99)
GLUCOSE BLDC GLUCOMTR-MCNC: 121 MG/DL (ref 70–99)
GLUCOSE BLDC GLUCOMTR-MCNC: 122 MG/DL (ref 70–99)
GLUCOSE BLDC GLUCOMTR-MCNC: 127 MG/DL (ref 70–99)
GLUCOSE BLDC GLUCOMTR-MCNC: 129 MG/DL (ref 70–99)
GLUCOSE BLDC GLUCOMTR-MCNC: 132 MG/DL (ref 70–99)
GLUCOSE BLDC GLUCOMTR-MCNC: 132 MG/DL (ref 70–99)
GLUCOSE BLDC GLUCOMTR-MCNC: 133 MG/DL (ref 70–99)
GLUCOSE BLDC GLUCOMTR-MCNC: 139 MG/DL (ref 70–99)
GLUCOSE BLDC GLUCOMTR-MCNC: 152 MG/DL (ref 70–99)
GLUCOSE BLDC GLUCOMTR-MCNC: 155 MG/DL (ref 70–99)
GLUCOSE BLDC GLUCOMTR-MCNC: 160 MG/DL (ref 70–99)
GLUCOSE BLDC GLUCOMTR-MCNC: 161 MG/DL (ref 70–99)
GLUCOSE BLDC GLUCOMTR-MCNC: 168 MG/DL (ref 70–99)
GLUCOSE BLDC GLUCOMTR-MCNC: 172 MG/DL (ref 70–99)
GLUCOSE BLDC GLUCOMTR-MCNC: 86 MG/DL (ref 70–99)
GLUCOSE BLDC GLUCOMTR-MCNC: 90 MG/DL (ref 70–99)
GLUCOSE SERPL-MCNC: 127 MG/DL (ref 70–99)
HCT VFR BLD AUTO: 23.4 % (ref 35–47)
HGB BLD-MCNC: 7.6 G/DL (ref 11.7–15.7)
HGB FREE PLAS-MCNC: <30 MG/DL
IMM GRANULOCYTES # BLD: 0.1 10E9/L (ref 0–0.4)
IMM GRANULOCYTES NFR BLD: 0.4 %
LMWH PPP CHRO-ACNC: <0.1 IU/ML
LMWH PPP CHRO-ACNC: <0.1 IU/ML
LYMPHOCYTES # BLD AUTO: 0.5 10E9/L (ref 0.8–5.3)
LYMPHOCYTES NFR BLD AUTO: 3.5 %
MAGNESIUM SERPL-MCNC: 2 MG/DL (ref 1.6–2.3)
MCH RBC QN AUTO: 30.3 PG (ref 26.5–33)
MCHC RBC AUTO-ENTMCNC: 32.5 G/DL (ref 31.5–36.5)
MCV RBC AUTO: 93 FL (ref 78–100)
MONOCYTES # BLD AUTO: 0.5 10E9/L (ref 0–1.3)
MONOCYTES NFR BLD AUTO: 3.1 %
NEUTROPHILS # BLD AUTO: 13.8 10E9/L (ref 1.6–8.3)
NEUTROPHILS NFR BLD AUTO: 92 %
NRBC # BLD AUTO: 0 10*3/UL
NRBC BLD AUTO-RTO: 0 /100
PHOSPHATE SERPL-MCNC: 3.5 MG/DL (ref 2.5–4.5)
PLATELET # BLD AUTO: 164 10E9/L (ref 150–450)
POTASSIUM SERPL-SCNC: 3.5 MMOL/L (ref 3.4–5.3)
RBC # BLD AUTO: 2.51 10E12/L (ref 3.8–5.2)
SODIUM SERPL-SCNC: 138 MMOL/L (ref 133–144)
SPECIMEN SOURCE: NORMAL
TACROLIMUS BLD-MCNC: 10.5 UG/L (ref 5–15)
TME LAST DOSE: NORMAL H
WBC # BLD AUTO: 15 10E9/L (ref 4–11)

## 2018-03-09 PROCEDURE — 25000125 ZZHC RX 250: Performed by: STUDENT IN AN ORGANIZED HEALTH CARE EDUCATION/TRAINING PROGRAM

## 2018-03-09 PROCEDURE — 25000128 H RX IP 250 OP 636: Performed by: STUDENT IN AN ORGANIZED HEALTH CARE EDUCATION/TRAINING PROGRAM

## 2018-03-09 PROCEDURE — 25000132 ZZH RX MED GY IP 250 OP 250 PS 637: Performed by: STUDENT IN AN ORGANIZED HEALTH CARE EDUCATION/TRAINING PROGRAM

## 2018-03-09 PROCEDURE — 97110 THERAPEUTIC EXERCISES: CPT | Mod: GO | Performed by: OCCUPATIONAL THERAPIST

## 2018-03-09 PROCEDURE — 97530 THERAPEUTIC ACTIVITIES: CPT | Mod: GP

## 2018-03-09 PROCEDURE — 25000125 ZZHC RX 250: Performed by: THORACIC SURGERY (CARDIOTHORACIC VASCULAR SURGERY)

## 2018-03-09 PROCEDURE — 84460 ALANINE AMINO (ALT) (SGPT): CPT | Performed by: THORACIC SURGERY (CARDIOTHORACIC VASCULAR SURGERY)

## 2018-03-09 PROCEDURE — 40000133 ZZH STATISTIC OT WARD VISIT: Performed by: OCCUPATIONAL THERAPIST

## 2018-03-09 PROCEDURE — 25000128 H RX IP 250 OP 636: Performed by: PHYSICIAN ASSISTANT

## 2018-03-09 PROCEDURE — 85025 COMPLETE CBC W/AUTO DIFF WBC: CPT | Performed by: THORACIC SURGERY (CARDIOTHORACIC VASCULAR SURGERY)

## 2018-03-09 PROCEDURE — 83051 HEMOGLOBIN PLASMA: CPT | Performed by: THORACIC SURGERY (CARDIOTHORACIC VASCULAR SURGERY)

## 2018-03-09 PROCEDURE — 25000132 ZZH RX MED GY IP 250 OP 250 PS 637: Performed by: PHYSICIAN ASSISTANT

## 2018-03-09 PROCEDURE — 85520 HEPARIN ASSAY: CPT | Performed by: PHYSICIAN ASSISTANT

## 2018-03-09 PROCEDURE — 40000225 ZZH STATISTIC SLP WARD VISIT: Performed by: SPEECH-LANGUAGE PATHOLOGIST

## 2018-03-09 PROCEDURE — 83735 ASSAY OF MAGNESIUM: CPT | Performed by: THORACIC SURGERY (CARDIOTHORACIC VASCULAR SURGERY)

## 2018-03-09 PROCEDURE — 25000132 ZZH RX MED GY IP 250 OP 250 PS 637: Performed by: THORACIC SURGERY (CARDIOTHORACIC VASCULAR SURGERY)

## 2018-03-09 PROCEDURE — 25000128 H RX IP 250 OP 636: Performed by: SURGERY

## 2018-03-09 PROCEDURE — 85520 HEPARIN ASSAY: CPT | Performed by: THORACIC SURGERY (CARDIOTHORACIC VASCULAR SURGERY)

## 2018-03-09 PROCEDURE — 25000132 ZZH RX MED GY IP 250 OP 250 PS 637: Performed by: SURGERY

## 2018-03-09 PROCEDURE — 25000125 ZZHC RX 250: Performed by: SURGERY

## 2018-03-09 PROCEDURE — 20000004 ZZH R&B ICU UMMC

## 2018-03-09 PROCEDURE — P9047 ALBUMIN (HUMAN), 25%, 50ML: HCPCS | Performed by: STUDENT IN AN ORGANIZED HEALTH CARE EDUCATION/TRAINING PROGRAM

## 2018-03-09 PROCEDURE — 80197 ASSAY OF TACROLIMUS: CPT | Performed by: THORACIC SURGERY (CARDIOTHORACIC VASCULAR SURGERY)

## 2018-03-09 PROCEDURE — 97535 SELF CARE MNGMENT TRAINING: CPT | Mod: GO | Performed by: OCCUPATIONAL THERAPIST

## 2018-03-09 PROCEDURE — 71045 X-RAY EXAM CHEST 1 VIEW: CPT

## 2018-03-09 PROCEDURE — 25000132 ZZH RX MED GY IP 250 OP 250 PS 637: Performed by: ANESTHESIOLOGY

## 2018-03-09 PROCEDURE — 97116 GAIT TRAINING THERAPY: CPT | Mod: GP

## 2018-03-09 PROCEDURE — 85379 FIBRIN DEGRADATION QUANT: CPT | Performed by: THORACIC SURGERY (CARDIOTHORACIC VASCULAR SURGERY)

## 2018-03-09 PROCEDURE — 27210429 ZZH NUTRITION PRODUCT INTERMEDIATE LITER

## 2018-03-09 PROCEDURE — 92526 ORAL FUNCTION THERAPY: CPT | Mod: GN | Performed by: SPEECH-LANGUAGE PATHOLOGIST

## 2018-03-09 PROCEDURE — 40000193 ZZH STATISTIC PT WARD VISIT

## 2018-03-09 PROCEDURE — 00000146 ZZHCL STATISTIC GLUCOSE BY METER IP

## 2018-03-09 PROCEDURE — 25000125 ZZHC RX 250: Performed by: ANESTHESIOLOGY

## 2018-03-09 PROCEDURE — 80069 RENAL FUNCTION PANEL: CPT | Performed by: THORACIC SURGERY (CARDIOTHORACIC VASCULAR SURGERY)

## 2018-03-09 PROCEDURE — 25000131 ZZH RX MED GY IP 250 OP 636 PS 637: Performed by: THORACIC SURGERY (CARDIOTHORACIC VASCULAR SURGERY)

## 2018-03-09 PROCEDURE — 85300 ANTITHROMBIN III ACTIVITY: CPT | Performed by: THORACIC SURGERY (CARDIOTHORACIC VASCULAR SURGERY)

## 2018-03-09 PROCEDURE — 99291 CRITICAL CARE FIRST HOUR: CPT | Mod: GC | Performed by: ANESTHESIOLOGY

## 2018-03-09 RX ORDER — BUMETANIDE 0.25 MG/ML
2 INJECTION INTRAMUSCULAR; INTRAVENOUS ONCE
Status: COMPLETED | OUTPATIENT
Start: 2018-03-09 | End: 2018-03-09

## 2018-03-09 RX ORDER — BUMETANIDE 0.25 MG/ML
1 INJECTION INTRAMUSCULAR; INTRAVENOUS EVERY 8 HOURS
Status: DISCONTINUED | OUTPATIENT
Start: 2018-03-09 | End: 2018-03-11

## 2018-03-09 RX ORDER — VALGANCICLOVIR HYDROCHLORIDE 50 MG/ML
450 POWDER, FOR SOLUTION ORAL DAILY
Status: DISCONTINUED | OUTPATIENT
Start: 2018-03-09 | End: 2018-03-11

## 2018-03-09 RX ORDER — SULFAMETHOXAZOLE AND TRIMETHOPRIM 200; 40 MG/5ML; MG/5ML
10 SUSPENSION ORAL DAILY
Status: DISCONTINUED | OUTPATIENT
Start: 2018-03-10 | End: 2018-03-09

## 2018-03-09 RX ORDER — HEPARIN SODIUM 10000 [USP'U]/100ML
550 INJECTION, SOLUTION INTRAVENOUS CONTINUOUS
Status: DISPENSED | OUTPATIENT
Start: 2018-03-09 | End: 2018-03-12

## 2018-03-09 RX ORDER — ALBUMIN (HUMAN) 12.5 G/50ML
25 SOLUTION INTRAVENOUS EVERY 8 HOURS
Status: COMPLETED | OUTPATIENT
Start: 2018-03-09 | End: 2018-03-10

## 2018-03-09 RX ORDER — BUMETANIDE 0.25 MG/ML
1 INJECTION INTRAMUSCULAR; INTRAVENOUS ONCE
Status: COMPLETED | OUTPATIENT
Start: 2018-03-09 | End: 2018-03-09

## 2018-03-09 RX ORDER — SULFAMETHOXAZOLE AND TRIMETHOPRIM 400; 80 MG/1; MG/1
1 TABLET ORAL DAILY
Status: DISCONTINUED | OUTPATIENT
Start: 2018-03-10 | End: 2018-03-24 | Stop reason: HOSPADM

## 2018-03-09 RX ADMIN — Medication: at 09:15

## 2018-03-09 RX ADMIN — PREDNISOLONE SODIUM PHOSPHATE 12.9 MG: 15 SOLUTION ORAL at 09:14

## 2018-03-09 RX ADMIN — DEXTROSE MONOHYDRATE 4.5 G: 5 INJECTION, SOLUTION INTRAVENOUS at 04:35

## 2018-03-09 RX ADMIN — NYSTATIN 1000000 UNITS: 500000 SUSPENSION ORAL at 09:16

## 2018-03-09 RX ADMIN — METOPROLOL TARTRATE 25 MG: 25 TABLET, FILM COATED ORAL at 12:30

## 2018-03-09 RX ADMIN — TACROLIMUS 80 MCG/HR: 5 INJECTION, SOLUTION INTRAVENOUS at 00:27

## 2018-03-09 RX ADMIN — LEVALBUTEROL TARTRATE 2 PUFF: 45 AEROSOL, METERED ORAL at 15:59

## 2018-03-09 RX ADMIN — LEVALBUTEROL TARTRATE 2 PUFF: 45 AEROSOL, METERED ORAL at 09:10

## 2018-03-09 RX ADMIN — BUMETANIDE 1 MG: 0.25 INJECTION INTRAMUSCULAR; INTRAVENOUS at 14:27

## 2018-03-09 RX ADMIN — BUMETANIDE 1 MG: 0.25 INJECTION INTRAMUSCULAR; INTRAVENOUS at 10:22

## 2018-03-09 RX ADMIN — VALGANCICLOVIR HYDROCHLORIDE 450 MG: 50 POWDER, FOR SOLUTION ORAL at 09:15

## 2018-03-09 RX ADMIN — HUMAN INSULIN 2 UNITS/HR: 100 INJECTION, SOLUTION SUBCUTANEOUS at 22:10

## 2018-03-09 RX ADMIN — MULTIPLE VITAMINS W/ MINERALS TAB 1 TABLET: TAB at 10:02

## 2018-03-09 RX ADMIN — ALBUMIN HUMAN 25 G: 0.25 SOLUTION INTRAVENOUS at 02:17

## 2018-03-09 RX ADMIN — LEVALBUTEROL TARTRATE 2 PUFF: 45 AEROSOL, METERED ORAL at 04:35

## 2018-03-09 RX ADMIN — LEVALBUTEROL TARTRATE 2 PUFF: 45 AEROSOL, METERED ORAL at 00:06

## 2018-03-09 RX ADMIN — METOPROLOL TARTRATE 25 MG: 25 TABLET, FILM COATED ORAL at 00:06

## 2018-03-09 RX ADMIN — ACETAMINOPHEN 650 MG: 325 TABLET, FILM COATED ORAL at 00:08

## 2018-03-09 RX ADMIN — ALBUMIN HUMAN 25 G: 0.25 SOLUTION INTRAVENOUS at 17:40

## 2018-03-09 RX ADMIN — NYSTATIN 1000000 UNITS: 500000 SUSPENSION ORAL at 15:58

## 2018-03-09 RX ADMIN — DEXTROSE MONOHYDRATE 4.5 G: 5 INJECTION, SOLUTION INTRAVENOUS at 16:12

## 2018-03-09 RX ADMIN — DEXTROSE MONOHYDRATE 4.5 G: 5 INJECTION, SOLUTION INTRAVENOUS at 11:38

## 2018-03-09 RX ADMIN — NYSTATIN 1000000 UNITS: 500000 SUSPENSION ORAL at 11:43

## 2018-03-09 RX ADMIN — NYSTATIN 1000000 UNITS: 500000 SUSPENSION ORAL at 20:19

## 2018-03-09 RX ADMIN — MYCOPHENOLATE MOFETIL 1500 MG: 200 POWDER, FOR SUSPENSION ORAL at 09:13

## 2018-03-09 RX ADMIN — QUETIAPINE 50 MG: 50 TABLET ORAL at 20:19

## 2018-03-09 RX ADMIN — ALBUMIN HUMAN 25 G: 0.25 SOLUTION INTRAVENOUS at 10:09

## 2018-03-09 RX ADMIN — MYCOPHENOLATE MOFETIL 1500 MG: 200 POWDER, FOR SUSPENSION ORAL at 17:49

## 2018-03-09 RX ADMIN — LEVALBUTEROL TARTRATE 2 PUFF: 45 AEROSOL, METERED ORAL at 20:21

## 2018-03-09 RX ADMIN — HEPARIN SODIUM 750 UNITS/HR: 10000 INJECTION, SOLUTION INTRAVENOUS at 06:59

## 2018-03-09 RX ADMIN — TACROLIMUS 85 MCG/HR: 5 INJECTION, SOLUTION INTRAVENOUS at 22:05

## 2018-03-09 RX ADMIN — PANTOPRAZOLE SODIUM 40 MG: 40 TABLET, DELAYED RELEASE ORAL at 09:15

## 2018-03-09 RX ADMIN — BUMETANIDE 2 MG: 0.25 INJECTION INTRAMUSCULAR; INTRAVENOUS at 04:35

## 2018-03-09 RX ADMIN — BUMETANIDE 1 MG: 0.25 INJECTION INTRAMUSCULAR; INTRAVENOUS at 17:40

## 2018-03-09 RX ADMIN — POTASSIUM CHLORIDE 20 MEQ: 1.5 POWDER, FOR SOLUTION ORAL at 06:09

## 2018-03-09 RX ADMIN — PREDNISOLONE SODIUM PHOSPHATE 12.9 MG: 15 SOLUTION ORAL at 20:21

## 2018-03-09 RX ADMIN — SILDENAFIL CITRATE 10 MG: 10 POWDER, FOR SUSPENSION ORAL at 09:14

## 2018-03-09 RX ADMIN — SILDENAFIL CITRATE 10 MG: 10 POWDER, FOR SUSPENSION ORAL at 17:49

## 2018-03-09 RX ADMIN — DEXTROSE MONOHYDRATE 4.5 G: 5 INJECTION, SOLUTION INTRAVENOUS at 22:05

## 2018-03-09 RX ADMIN — VANCOMYCIN HYDROCHLORIDE 1000 MG: 1 INJECTION, SOLUTION INTRAVENOUS at 17:49

## 2018-03-09 RX ADMIN — LEVALBUTEROL TARTRATE 2 PUFF: 45 AEROSOL, METERED ORAL at 12:33

## 2018-03-09 RX ADMIN — OXYCODONE HYDROCHLORIDE 5 MG: 5 TABLET ORAL at 00:08

## 2018-03-09 NOTE — PLAN OF CARE
Problem: Patient Care Overview  Goal: Plan of Care/Patient Progress Review  Discharge Planner SLP   Patient plan for discharge: pt did not state  Current status: Pt no longer needing NG to suction as it was removed earlier this date. Pt cleared to participate in SLP services again today. Pt demonstrates continued s/sx of aspiration/penetration on thin liquid trials. Recommend Nectar thickened full liquid diet. Sit pt upright for po intake. Encourage small bites/sips and slow rate. SLP to follow per POC.   Barriers to return to prior living situation: risks associated with dysphagia  Recommendations for discharge: inpatient rehab  Rationale for recommendations: Pt will likely benefit from ongoing swallowing therapy.        Entered by: Krystle Davis 03/09/2018 3:29 PM

## 2018-03-09 NOTE — PROGRESS NOTES
Pulmonary Medicine  Cystic Fibrosis - Lung Transplant Daily Progress Note   March 8, 2018       Patient: Kecia Blue  MRN: [id number]  Transplant Date: 2/21/2018 (POD# 7)  Admission date: 2/21/2018  Hospital Day #15          Assessment and Plan:   Kecia Blue is a 56 y/o female w/PMHx significant for dermatomyositis (on immunosuppression), seronegative RA, ILD, and pulmonary hypertension who was admitted for emergent lung transplant w/u on 2/21 for increased SOB and hypoxia. Required V-A ECMO for right heart failure on 2/27. Now s/p bilateral sequential lung transplant on 3/1, ECMO decannulation on 3/3. Post-op course c/b hemodynamic instability requiring pressors and iFlolan. Was extubated O/N of 3/7, flolan d/c'd, off pressors.    Plan Today:  -Increased tacrolimus from 40 mcg/hr to 80 mcg/hr, Will check level in AM.  -Increase metoprolol from 12.5 mg TID to 25 mg BID  -Speech Eval (still has NJ tube)  -Pull swan/art line  -D/C sched bowel regimen and change to prn  -PT consult  -F/U on donor culture results  -considered nitroglycerin paste for feet but this would interact with sildenafil to cause too much hypotension, so we plan to keep feet covered and warm  -Pt has DVT in L UE; Heparin drip started 3/7  -Speech recs initate nectar thickened full liquid diet  -if pt continues to have recurrent Afib with RVR consider EP consultation.  -Explant pathology shows NSIP (see path report below in results)      -continue to monitor for worsening of hemothorax  -NG tube placement for decompression  -Give meds through NJ tube   -ECHO shows mod red RV function but shows that global biventricular function has improved.         ##S/p bilateral lung transplant on 3/1/18 for acute hypoxic/hypercapneic respiratory failure 2/2 ILD  ##Extubated 3/6/18  ##Pulm HTN 2/2 ILD  Adequate graft function. Weaned off iFlolan. On NC of 1.5 and oxygenating well.   - Antibiotics as noted below  - Agree with lasix intermittent  dosing and also albumin   - Continue aggressive pulmonary toilet with chest physiotherapy QID (acapella/incentive spirometry once extubated)  - Chest tube management per CVTS  - Explant pathology shows NSIP (see path report below in results)    - Agree with continuing sildenafil    ##Small-mod L pleural effusion, likely hemothorax  Pt required 1 unit pRBCs O/N. Will CTM with imaging. If worsening, could require VATS.                                Continue 3-drug immunosuppression:  Goal tacrolimus target level 9-11 (reduced for RADHA)  - IV tacrolimus @ 80 mcg/hr today and today's tacro level is 8.3. We will continue to monitor drug levels daily while on infusion, adjust accordingly.    - MMF 1500 g BID  - Prednisolone 12.9mg BID     Prophylaxis:   PJP: Bactrim  CMV: will begin vangancyclovir 900mg daily POD #8 (ordered)  Fungal: Nystatin       Donor Recipient Intervention   CMV status  Pos   Pos  Valgancyclovir POD#8   EBV status Pos Pos Immune    HSV status N/A  Pos Immune      ##Infectious disease  ##SIRS  No known infectious disease history. SIRS likely a response from multiple procedures (VA ECMO cannulation and decannulation) and transplant surgery. Fever curve improving. Negative recipient bronch culture from time of transplant. Pan cultured 3/4 NGTD.   - Donor culture at [facility] growing MRSA -- continue vancomycin   - Donor culture at OSH preliminarily growing MSSA, Strep pneumoniae, Moraxella catarrhalis, Haemophilus species. Final sensitivities on donor cultures pending. Continue pip/tazo at this time.     ##Nutrtition  -Through TF  -Speech ok with nectar-thickened full liquid diet    ##Diarrhea  ##Large bowel loops on imaging  ##Pain on defecation--could be anal fissure vs hemorrhoid  Could be 2/2 zosyn or MMF. C diff neg.    --Hold tube feeds and place NG tube for decompression.    --desitin cream daily BID when removing anal pouch    ##LUE DVT, provoked  Found on U/S on 3/7, started on heparin drip on 3/7.       We appreciate the excellent care provided by the ICU team. We will continue to follow along closely, please do not hesitate to call with any questions or concerns.     Patient discussed with Dr. Christensen.    Alessandra Lombardi MD PhD   Internal Medicine, PGY 1  p             Subjective & Interval History:     Last 24 hour vital signs, labs and care team notes reviewed.    Remains on nasal cannula. She is having some pain with BMs and diarrhea. She is s/p one unit of pRBCs. Metoprolol was held this AM for hypotension. She received 250 mL of albumin and was placed on NE for hypotension.            Review of Systems:     Unable to perform ROS as patient remains intubated, sedated.           Medications:     Active Medications:    albumin human  25 g Intravenous Q8H     [START ON 3/9/2018] multivitamin, therapeutic with minerals  1 tablet Oral or Feeding Tube Daily     furosemide  40 mg Intravenous TID     metoprolol tartrate  25 mg Oral BID     piperacillin-tazobactam  4.5 g Intravenous Q6H     pantoprazole  40 mg Oral or Feeding Tube Daily     [START ON 3/9/2018] valGANciclovir  450 mg Oral or Feeding Tube Once per day on Mon Wed Fri     sennosides  5 mL Oral or Feeding Tube BID     QUEtiapine  50 mg Oral At Bedtime     vancomycin (VANCOCIN) IV  1,000 mg Intravenous Q24H     levalbuterol  2 puff Inhalation Q4H JULES     sulfamethoxazole-trimethoprim  10 mL Oral or NG Tube Once per day on Tue Thu Sat    Or     sulfamethoxazole-trimethoprim  1 tablet Oral or NG Tube Once per day on Tue Thu Sat     sildenafil  10 mg Oral or Feeding Tube Q8H     nystatin  1,000,000 Units Swish & Swallow 4x Daily     mycophenolate  1,500 mg Oral BID IS    Or     mycophenolate  1,500 mg Oral or NG Tube BID IS     prednisoLONE  0.25 mg/kg (Dosing Weight) Oral or NG Tube BID     Active PRN Medications:  potassium phosphate (KPHOS) in D5W IV, potassium phosphate (KPHOS) in D5W IV, potassium phosphate (KPHOS) in D5W IV, potassium  phosphate (KPHOS) in D5W IV, potassium phosphate (KPHOS) in D5W IV, heparin, artificial tears, metoprolol, IV fluid REPLACEMENT ONLY, naloxone, acetaminophen, oxyCODONE IR, ondansetron **OR** ondansetron, IV fluid REPLACEMENT ONLY, glucose **OR** dextrose **OR** glucagon, potassium chloride, potassium chloride, potassium chloride, potassium chloride with lidocaine, potassium chloride, magnesium sulfate, magnesium sulfate         Physical Exam:     Constitutional: On Nasal cannula, resting in bed, able to answer questions with sense of humor. In no apparent distress.   HEENT: Eyes with pink conjunctivae, no scleral jaundice. Neck supple without lymphadenopathy. NC in nares.   PULM: Good air flow bilaterally. Clear lung sounds.    CV: Normal S1 and S2. RRR. No murmur, gallop, or rub. No LE edema. Peripheral pulses intact.    ABD: Hypoactive BS, soft, nontender, nondistended.   MSK: Moves all extremities. No apparent muscle wasting. Cap refill < 5 seconds. Upper extremity on L appears more swollen than right without pitting edema. Hands and feet are cold, bilaterally.   NEURO:  Following simple commands. Answers questions.   SKIN: No pruritus or rash on limited exam. ?     Lines, Drains, and Devices:  Peripheral IV 02/21/18 Left;Lateral Upper forearm (Active)   Site Assessment WDL 3/6/2018 12:00 PM   Line Status Infusing 3/6/2018 12:00 PM   Phlebitis Scale 0-->no symptoms 3/6/2018 12:00 PM   Infiltration Scale 0 3/6/2018 12:00 PM   Infiltration Site Treatment Method  None 3/5/2018  4:00 AM   Extravasation? No 3/6/2018 12:00 PM   Dressing Intervention New dressing  3/3/2018  4:00 AM   IV Site Rotation Due Date 02/25/18 3/2/2018  4:00 AM   Reason Not Rotated Poor venous access 3/2/2018  4:00 AM   Number of days:13       Arterial Line 02/27/18 Femoral (Active)   Site Assessment WDL 3/6/2018 12:00 PM   Line Status Pulsatile blood flow 3/6/2018 12:00 PM   Art Line Waveform Appropriate 3/6/2018 12:00 PM   Art Line  "Interventions Leveled 3/6/2018 12:00 PM   Color/Movement/Sensation Capillary refill less than 3 sec 3/6/2018 12:00 PM   Dressing Type Transparent 3/6/2018 12:00 PM   Dressing Status Clean, dry, intact 3/6/2018 12:00 PM   Dressing Intervention Dressing changed/new dressing 3/6/2018  4:00 AM   Dressing Change Due 03/11/18 3/6/2018 12:00 PM   Number of days:7       CVC Double Lumen 03/01/18 (Active)   Site Assessment WDL 3/6/2018 12:00 PM   Extravasation? No 3/6/2018 12:00 PM   Dressing Intervention Transparent;Chlorhexidine sponge 3/6/2018 12:00 PM   Dressing Change Due 03/11/18 3/6/2018 12:00 PM   CVC Comment Cordis 3/5/2018  8:00 AM   CVC Lumen Assessment White;Brown;Blue 3/2/2018  4:00 AM   Number of days:5       Pulmonary Artery Catheter - Single Lumen 03/03/18 1000 Left internal jugular vein continuous hemodynamic catheter (Active)   Dressing dressing dry and intact 3/6/2018  8:00 AM   Securement secured with sutures 3/6/2018  8:00 AM   PA Catheter Markings (cm) 51 3/6/2018  8:00 AM   Phlebitis 0-->no symptoms 3/6/2018  8:00 AM   Infiltration 0-->no symptoms 3/6/2018  8:00 AM   Waveform normal 3/6/2018  8:00 AM   Balloon Inflation Volume to Obtain Wedge Tracing (mL) 0 3/4/2018  4:00 AM   Pressure Catheter Interventions blood specimen obtained and sent to lab 3/6/2018  8:00 AM   Daily Review Of Necessity completed 3/6/2018  8:00 AM   Number of days:3            Data:     All vital signs, laboratory and imaging data for the past 24 hours reviewed.      Vital signs:  Temp: 98.9  F (37.2  C) Temp src: Axillary BP: 99/73   Heart Rate: 94 Resp: 28 SpO2: 98 % O2 Device: Nasal cannula Oxygen Delivery: 2 LPM Height: 160 cm (5' 3\") Weight: 60.7 kg (133 lb 13.1 oz)    Weight trend:   Vitals:    03/06/18 0430 03/07/18 0400 03/08/18 0400   Weight: 61 kg (134 lb 7.7 oz) 59.7 kg (131 lb 9.8 oz) 60.7 kg (133 lb 13.1 oz)        I/O:   Intake/Output Summary (Last 24 hours) at 03/06/18 1411  Last data filed at 03/06/18 1400   Gross " per 24 hour   Intake          3809.61 ml   Output             3095 ml   Net           714.61 ml       Labs    CMP:   Recent Labs  Lab 03/08/18  0403 03/07/18  1757 03/07/18  1150 03/07/18  0354 03/07/18  0010 03/06/18  2319  03/06/18  0402  03/05/18  0842 03/05/18  0355  03/03/18  0329  03/01/18 2017     --   --  146* 146*  --   --  151*  < > 150* 151*  < > 147*  < > 150*   POTASSIUM 3.9 4.1 4.0 3.8 4.3 4.3  < > 4.2  < > 4.2 4.0  < > 4.2  < > 3.4   CHLORIDE 107  --   --  112* 114*  --   --  120*  < > 117* 120*  < > 117*  < > 119*   CO2 24  --   --  25 23  --   --  20  < > 22 21  < > 20  < > 24   ANIONGAP 11  --   --  9 10  --   --  11  < > 11 10  < > 10  < > 7   *  --   --  169* 208*  --   --  138*  < > 146* 130*  < > 168*  < > 232*   BUN 48*  --   --  57* 63*  --   --  70*  < > 70* 69*  < > 53*  < > 30   CR 1.33*  --   --  1.40* 1.50*  --   --  1.65*  < > 1.80* 1.83*  < > 1.80*  < > 0.95   GFRESTIMATED 41*  --   --  39* 36*  --   --  32*  < > 29* 29*  < > 29*  < > 61   GFRESTBLACK 50*  --   --  47* 44*  --   --  39*  < > 35* 35*  < > 35*  < > 74   CHELSEY 6.8*  --   --  7.7* 7.5*  --   --  7.4*  < > 7.8* 7.4*  < > 7.1*  < > 6.8*   MAG 2.2 2.5* 1.9 2.5*  --  1.8  --  2.3  < >  --  2.5*  < > 2.1  < > 1.9   PHOS 2.8  --  3.0 2.4*  --  2.3*  --  3.3  --   --  4.0  < > 4.4  < > 3.9   PROTTOTAL  --   --   --   --   --   --   --   --   --   --   --   --  4.0*  --  3.7*   ALBUMIN 1.8*  --   --  2.0*  --   --   --  2.1*  --   --  2.1*  < > 2.4*  < > 1.7*   BILITOTAL  --   --   --   --   --   --   --   --   --   --   --   --  1.5*  --  1.2   ALKPHOS  --   --   --   --   --   --   --   --   --   --   --   --  27*  --  35*   AST  --   --   --   --   --   --   --   --   --   --   --   --  34  --  38   ALT 28  --   --  39  --   --   --  42  --  40  --   < > 29  < > 65*   < > = values in this interval not displayed.  CBC:     Recent Labs  Lab 03/08/18  1635 03/08/18  0403 03/07/18  1150 03/07/18  0354 03/06/18  0402    WBC  --  17.8* 16.7* 17.7* 18.3*   RBC  --  2.32* 2.51* 2.71* 2.71*   HGB 8.8* 7.1* 7.8* 8.4* 8.2*   HCT  --  22.0* 24.3* 26.0* 25.4*   MCV  --  95 97 96 94   MCH  --  30.6 31.1 31.0 30.3   MCHC  --  32.3 32.1 32.3 32.3   RDW  --  16.8* 16.9* 16.7* 16.5*   PLT  --  178 157 162 141*     INR:     Recent Labs  Lab 03/05/18  1648 03/05/18  1132 03/05/18  0842 03/05/18  0355   INR 1.11 1.13 1.13 1.15*     Glucose:   Recent Labs  Lab 03/08/18  1628 03/08/18  1517 03/08/18  1417 03/08/18  1156 03/08/18  1104 03/08/18  0927  03/08/18  0403  03/07/18  0354  03/07/18  0010  03/06/18  0402  03/05/18  1648  03/05/18  1132   GLC  --   --   --   --   --   --   --  147*  --  169*  --  208*  --  138*  --  160*  --  243*   * 146* 159* 98 124* 149*  < >  --   < >  --   < >  --   < >  --   < >  --   < >  --    < > = values in this interval not displayed.  Blood Gas:   Recent Labs  Lab 03/07/18  0355 03/07/18  0354 03/07/18  0010  03/06/18 2039   PHV 7.43  --  7.42  --  7.37   PCO2V 41  --  42  --  45   PO2V 31  --  32  --  33   HCO3V 28  --  27  --  26   LASHAUN 3.1  --  2.4  --  0.7   O2PER 2L 2L 2L  < > 3L   < > = values in this interval not displayed.  Culture Data     Recent Labs  Lab 03/06/18  1330 03/04/18  2057 03/04/18  1417 03/04/18  1411 03/03/18  0314 03/02/18  0524   CULT Culture negative after 2 days  No growth No growth Culture negative monitoring continues Culture negative monitoring continues No growth after 5 days No growth     Virology Data:   Lab Results   Component Value Date    FLUAH1 Negative 02/27/2018    FLUAH3 Negative 02/27/2018    HK0065 Negative 02/27/2018    IFLUB Negative 02/27/2018    RSVA Negative 02/27/2018    RSVB Negative 02/27/2018    PIV1 Negative 02/27/2018    PIV2 Negative 02/27/2018    PIV3 Negative 02/27/2018    HMPV Negative 02/27/2018    HRVS Negative 02/27/2018    ADVBE Negative 02/27/2018    ADVC Negative 02/27/2018     Historical CMV results (last 3 of prior testing):  Lab Results    Component Value Date    CMVQNT Canceled, Test credited (A) 03/01/2018    CMVQNT Canceled, Test credited (A) 03/01/2018     Lab Results   Component Value Date    CMVLOG Canceled, Test credited 03/01/2018    CMVLOG Canceled, Test credited 03/01/2018     Urine Studies    Recent Labs   Lab Test  03/04/18   1425   URINEPH  5.5   NITRITE  Negative   LEUKEST  Negative   WBCU  5     Explant Pathology  FINAL DIAGNOSIS:   A. LUNG, LEFT NATIVE, PNEUMONECTOMY:   - Nonspecific interstitial pneumonitis (NSIP) with large areas of   end-stage fibrosis, georges-airway foreign body   giant cell reaction with cholesterol clefts suggestive of aspiration, and   foci or organizing pneumonia.   - Six benign hilar lymph nodes.     B. LUNG, RIGHT NATIVE, PNEUMONECTOMY:   - Nonspecific interstitial pneumonitis (NSIP) with large areas of   end-stage fibrosis, georges-airway foreign body   giant cell reaction with cholesterol clefts suggestive of aspiration, and   foci or organizing pneumonia.   - Seven benign hilar lymph nodes.       EXAMINATION: CT CHEST ABDOMEN PELVIS W/O CONTRAST, 3/8/2018 9:09 AM     TECHNIQUE:  Helical CT images from the thoracic inlet through the  symphysis pubis were obtained  without contrast.     COMPARISON: Same-day chest x-ray. CT chest 2/22/2018.     HISTORY: Hypotension of unclear etiology. Status post bilateral lung  transplant on 3/1/2018 for acute hypoxic/hypercapnic respiratory  failure secondary to interstitial lung disease.     FINDINGS:     Chest:    Post surgical changes of bilateral lung transplant. Median sternotomy  wires seen which apparent intact. Left IJ central venous catheter tip  is located at the innominate confluence.Bilateral pleural drains from  subxiphoid approach, with introduced air into the associated  subcutaneous tissues and small bilateral anterior pneumothoraces.     Marked decompression of both atria, as well as significant  decompression of the right ventricle. Hypoattenuation of the  blood  pool relative to the myocardium and walls of the aorta, compatible  with anemia. Mild aortic arch calcifications. Mild scattered blood  products and foci of gas noted throughout the mediastinum. There is a  small to moderate left-sided pleural effusion containing  heterogeneous, hyperdense material compatible with blood products;  cannot exclude active extravasation this unenhanced examination. Small  loculation of pleural fluid over the lateral pleural space. Simple  fluid attenuating small to moderate right pleural effusion. Mild  angulation and narrowing at the left pulmonary arterial anastomosis.  Airway anastomoses are patent though there is mild/moderate narrowing  on the right (series 2, image 46 and series 5, image 54). Bibasilar  atelectasis. Normal caliber great vessels. Extensive bibasilar  compressive atelectasis. Trace gas in both pleural spaces.  Incidentally noted azygos fissure.     Abdomen and pelvis:      Feeding tube tip is located in the second portion of the duodenum.     Interposition of large bowel loops between the liver and diaphragm on  the right. Otherwise no abnormally dilated loops of bowel. Tiny  calcified gallstones. Normal appearance of the liver, spleen, adrenal  glands, kidneys, and pancreas. Decompressed urinary bladder drained by  Basilio catheter with small amount of air, likely iatrogenically  introduced. Small amount of dense material in the urinary bladder,  dependently on the right, potentially consolidation/stone material.  Small free fluid in the pelvis. Small fibroid anteriorly at the  uterine fundus. Uterus otherwise appears unremarkable. No adnexal  mass. Periuterine vascular calcifications. Abdominal aortic aneurysm.  No bowel obstruction.     Bones and soft tissues:   Bones are osteopenic. Degenerative changes of the spine. Mediastinal  wires which otherwise appear unremarkable. No visualized retrosternal  hematoma. Diffuse anasarca.         Impression:  1.  Postoperative changes of bilateral lung presentation with tiny  anterior bilateral pneumothoraces and minimal scattered foci of  pneumomediastinum, likely post surgical.  2. Small to moderate left pleural effusion containing hyperdense blood  products; cannot exclude active extravasation on this unenhanced  examination. Small simple appearing right pleural effusion. Associated  atelectasis bilaterally.  3. Significant biatrial decompression indicative of hypovolemia. No  significant pericardial fluid to suggest constrictive physiology,  although mild blood products are noted in the mediastinum, potentially  postsurgical.  4. Mild free fluid in the pelvis, otherwise no acute abdominal  findings.  5. Additional incidental findings as described above.      EXAMINATION: CT CHEST ABDOMEN PELVIS W/O CONTRAST, 3/8/2018 9:09 AM     TECHNIQUE:  Helical CT images from the thoracic inlet through the  symphysis pubis were obtained  without contrast.     COMPARISON: Same-day chest x-ray. CT chest 2/22/2018.     HISTORY: Hypotension of unclear etiology. Status post bilateral lung  transplant on 3/1/2018 for acute hypoxic/hypercapnic respiratory  failure secondary to interstitial lung disease.     FINDINGS:     Chest:    Post surgical changes of bilateral lung transplant. Median sternotomy  wires seen which apparent intact. Left IJ central venous catheter tip  is located at the innominate confluence.Bilateral pleural drains from  subxiphoid approach, with introduced air into the associated  subcutaneous tissues and small bilateral anterior pneumothoraces.     Marked decompression of both atria, as well as significant  decompression of the right ventricle. Hypoattenuation of the blood  pool relative to the myocardium and walls of the aorta, compatible  with anemia. Mild aortic arch calcifications. Mild scattered blood  products and foci of gas noted throughout the mediastinum. There is a  small to moderate left-sided pleural  effusion containing  heterogeneous, hyperdense material compatible with blood products;  cannot exclude active extravasation this unenhanced examination. Small  loculation of pleural fluid over the lateral pleural space. Simple  fluid attenuating small to moderate right pleural effusion. Mild  angulation and narrowing at the left pulmonary arterial anastomosis.  Airway anastomoses are patent though there is mild/moderate narrowing  on the right (series 2, image 46 and series 5, image 54). Bibasilar  atelectasis. Normal caliber great vessels. Extensive bibasilar  compressive atelectasis. Trace gas in both pleural spaces.  Incidentally noted azygos fissure.     Abdomen and pelvis:      Feeding tube tip is located in the second portion of the duodenum.     Interposition of large bowel loops between the liver and diaphragm on  the right. Otherwise no abnormally dilated loops of bowel. Tiny  calcified gallstones. Normal appearance of the liver, spleen, adrenal  glands, kidneys, and pancreas. Decompressed urinary bladder drained by  Basilio catheter with small amount of air, likely iatrogenically  introduced. Small amount of dense material in the urinary bladder,  dependently on the right, potentially consolidation/stone material.  Small free fluid in the pelvis. Small fibroid anteriorly at the  uterine fundus. Uterus otherwise appears unremarkable. No adnexal  mass. Periuterine vascular calcifications. Abdominal aortic aneurysm.  No bowel obstruction.     Bones and soft tissues:   Bones are osteopenic. Degenerative changes of the spine. Mediastinal  wires which otherwise appear unremarkable. No visualized retrosternal  hematoma. Diffuse anasarca.         Impression:  1. Postoperative changes of bilateral lung presentation with tiny  anterior bilateral pneumothoraces and minimal scattered foci of  pneumomediastinum, likely post surgical.  2. Small to moderate left pleural effusion containing hyperdense blood  products;  cannot exclude active extravasation on this unenhanced  examination. Small simple appearing right pleural effusion. Associated  atelectasis bilaterally.  3. Significant biatrial decompression indicative of hypovolemia. No  significant pericardial fluid to suggest constrictive physiology,  although mild blood products are noted in the mediastinum, potentially  postsurgical.  4. Mild free fluid in the pelvis, otherwise no acute abdominal  findings.  5. Additional incidental findings as described above.      Physician Attestation   I, Tarik Mr Christensen, saw this patient with  and agree with findings and plan of care as documented in the above note.      I personally reviewed vital signs, medications and labs.     Tarik Christensen  Date of Service (when I saw the patient): 03/08/18

## 2018-03-09 NOTE — PROGRESS NOTES
Transplant Social Work Services Progress Note      Date of Initial Social Work Evaluation: 2/20/2018  Collaborated with: pulm team    Data: supportive visit with patient and spouse in ICU room.    Intervention: patient processed events of last few days.  So grateful for care and incredible progress she is making.  She is now off oxygen.  No complaints of pain.  Amazed by what she has been through.  Both patient and spouse coping well.  Laughing and in good spirits.    Assessment: coping well.  Pleased with recovery.    Education provided by SW: what to expect LOS, potential need for rehab, etc.     Plan:    Discharge Plans in Progress: unclear needs at this time.  [facility] vs. [facility] depending on progress.      Barriers to d/c plan: critically ill.     Follow up Plan: will continue to follow for support, counseling, education and resources.

## 2018-03-09 NOTE — PLAN OF CARE
Problem: Patient Care Overview  Goal: Plan of Care/Patient Progress Review  OT/Pulm Rehab 4E: Discharge Planner OT   Patient plan for discharge: not directly addressed; pt and family appear open to whatever is needed/recommended at time of discharge  Current status: Pt is alert, oriented and appropriate in conversation.  Following commands well though some difficulty with recall of education from previous session.  Pt and family educated on sternal precautions (handout provided),  Pt mod A x 1 supine to EOB, sit to stand and transfer to bedside chair (2nd person to assist with line management).  Mod A for g/h while seated in chair - limited by impaired BUE ROM and strength.  VSS on RA throughout session  Barriers to return to prior living situation: acute medical needs, weakness, impaired mobility, deconditioning, functional BUE deficits impacting independence with self cares  Recommendations for discharge: ARU  Rationale for recommendations: Pt is motivated, has good family support and will benefit from ongoing intensive therapy intervention to progress safety and independence with mobility and self cares.       Entered by: Lauren Lara 03/09/2018 9:17 AM

## 2018-03-09 NOTE — PROGRESS NOTES
CVICU PROGRESS NOTE  03/09/2018    ASSESSMENT: Kecia Blue is a 57 year old female with PMH significant for dermatomyositis on immunosuppression, eronegative RA, ILD, and pulmonary hypertension, who was admitted for emergent lung transplant work-up on 2/21 for increasing shortness of breath and hypoxia now s/p emergent [facility] ECMO placement on 2/27/18. Now s/p bilateral lung transplant on 3/1 and [facility] ECMO decannulation on 3/3 with Dr. Hernandez.      TODAY'S PROGRESS/PLAN  - Continue metoprolol 25mg BID  - Remove NG tube and leave NJ feeding tube.  - Resume TFs and ADAT  - SLP to eval for diet clearance  - Cont 25% albumin 25g q8h  - Change lasix to bumex 1mg Q8H  - NET I/O goal negative 1.5L over 24 hours  - Switch to straight rate heparin 550 units/hour    PLAN:  Neuro/ pain/ sedation:  # Postoperative acute pain  # Sedation for mechanical ventilation - extubated  - Monitor neurological status. Notify the MD for any acute changes in exam.  - PRN tylenol/oxycodone.     Pulmonary care:  # Acute on chronic respiratory failure, worsening, s/p bilateral lung transplantation 3/1/2018  # ILD related to dermatomyositis   # Severe pulmonary hypertension (WHO class III)  # Pneumomediastinum, 2/22/2018  # [facility] ECMO decannulated 3/3   - Supplemental oxygen to keep saturation above 92%.  - S/p Alan and Flolan for RV support    Cardiovascular:  # Right heart failure  # Right ventricular hypertrophy  # Pulmonary HTN  # Postoperative atrial fibrillation with RVR - sinus between  - Monitor hemodynamic status.   - Start Metoprolol 12.5 TID->25mg BID  - More noted hemodynamic instability with A fib, increase in metoprolol for BP control    GI care:  #Constipation - having BMs  #Diarrhea - resolving  - NPO, ADAT as able per speech eval.  - NG out with decompression of abdominal air  - NJ placed  - Bowel regimen    Fluids/ Electrolytes/ Nutrition:  #Hypernatremia   - Advance TFs to goal  - ICU electrolyte protocol  - D10W at 50cc/hr  if not on feeds  - FWF at 30cc/4 hours    Renal/ Fluid Balance:  #Acute kidney injury  #Volume overload  - Urine output is adequate so far with change to Bumex.  - Cr improving  - Diurese with lasix for net negative goal 1000mL negative  - Will continue to monitor intake and output.    Endocrine:  #Stress hyperglycemia    - Insulin gtt     ID/ Antibiotics:  #Febrile  - Anti-rejection and immunosuppressant medications per transplant team  - Donor lungs with MRSA, coverage for 2 weeks with vanco/zosyn until donor cultures return.   - Follow cultures    Heme:  #Acute blood loss anemia   #Acute LUE DVT on ultrasound   - Hemoglobin stable 7.1, transfused 1u PRBC 3/8  - Monitor Hgb and Plts, transfuse if Hgb < 7.   - Low-intensity dose heparin for DVT per surgery teams    Prophylaxis:    - Mechanical prophylaxis for DVT.   - Heparin SubQ TID.  - PPI    Lines/ tubes/ drains:  - NJ, CVC, PIV, Basilio, Chest tubes    Disposition:  - CVICU. Possible transfer to floor Sat/Sun.     Seen with Dr. Grant.    Kwesi Schwartz MD  Anesthesiology Resident CA2, PGY3      ====================================    TODAY'S PROGRESS:   SUBJECTIVE:   - Given 25% albumin overnight for soft pressures between lasix and metoprolol dosing.  - Received 2mg bumex for marginal UOP (responded).  - Had good night, able to sleep.   - On room air, comfortable.   - Otherwise no acute issues.     OBJECTIVE:   1. VITAL SIGNS:   Temp:  [98.4  F (36.9  C)-99.1  F (37.3  C)] 98.9  F (37.2  C)  Heart Rate:  [] 96  Resp:  [28-32] 28  BP: ()/(45-98) 112/90  SpO2:  [86 %-100 %] 95 %  Resp: 28    2. INTAKE/ OUTPUT:   I/O last 3 completed shifts:  In: 2680.64 [I.V.:1950.64; NG/GT:450]  Out: 3810 [Urine:2560; Emesis/NG output:400; Stool:150; Chest Tube:700]    3. PHYSICAL EXAMINATION:   General: Alert, NAD  Neuro: Alert, NAD, moves extremities, follows commands  Resp: Eequal breath sounds, somewhat diminished at bases.  CV: sinus, normocardic per tele.  CTs with s/s drainage.  Abdomen: Soft, mildly distended  Incisions: c/d/i  Extremities: warm, toes dusky bilaterally, cap refill < 2 seconds, +doppler signals bilateral PT. LUE with increased dusky color vs right side improved over previous    4. INVESTIGATIONS:   Arterial Blood Gases     Recent Labs  Lab 03/07/18  0354 03/06/18  2208 03/06/18  1905 03/06/18  1822   PH 7.47* 7.45 7.43 7.43   PCO2 35 36 36 36   PO2 113* 159* 156* 142*   HCO3 26 25 24 24     Complete Blood Count     Recent Labs  Lab 03/09/18  0430 03/08/18  1635 03/08/18  0403 03/07/18  1150 03/07/18  0354   WBC 15.0*  --  17.8* 16.7* 17.7*   HGB 7.6* 8.8* 7.1* 7.8* 8.4*     --  178 157 162     Basic Metabolic Panel    Recent Labs  Lab 03/09/18  0430 03/08/18  0403 03/07/18  1757 03/07/18  1150 03/07/18  0354 03/07/18  0010    142  --   --  146* 146*   POTASSIUM 3.5 3.9 4.1 4.0 3.8 4.3   CHLORIDE 102 107  --   --  112* 114*   CO2 27 24  --   --  25 23   BUN 42* 48*  --   --  57* 63*   CR 1.41* 1.33*  --   --  1.40* 1.50*   * 147*  --   --  169* 208*     Liver Function Tests    Recent Labs  Lab 03/09/18  0430 03/08/18  0403 03/07/18  0354 03/06/18  0402 03/05/18  1648 03/05/18  1132 03/05/18  0842 03/05/18  0355  03/03/18  0329   AST  --   --   --   --   --   --   --   --   --  34   ALT 21 28 39 42  --   --  40  --   < > 29   ALKPHOS  --   --   --   --   --   --   --   --   --  27*   BILITOTAL  --   --   --   --   --   --   --   --   --  1.5*   ALBUMIN 3.1* 1.8* 2.0* 2.1*  --   --   --  2.1*  < > 2.4*   INR  --   --   --   --  1.11 1.13 1.13 1.15*  < > 1.36*   < > = values in this interval not displayed.  Pancreatic Enzymes  No lab results found in last 7 days.  Coagulation Profile    Recent Labs  Lab 03/05/18  1648 03/05/18  1132 03/05/18  0842 03/05/18  0355   INR 1.11 1.13 1.13 1.15*   PTT 24 27 25 26     Lactate  Invalid input(s): LACTATE    5. RADIOLOGY:   Recent Results (from the past 24 hour(s))   CT Chest Abdomen Pelvis w/o  Contrast   Result Value    Radiologist flags Left hemothorax (Urgent)    Narrative    EXAMINATION: CT CHEST ABDOMEN PELVIS W/O CONTRAST, 3/8/2018 9:09 AM    TECHNIQUE:  Helical CT images from the thoracic inlet through the  symphysis pubis were obtained  without contrast.    COMPARISON: Same-day chest x-ray. CT chest 2/22/2018.    HISTORY: Hypotension of unclear etiology. Status post bilateral lung  transplant on 3/1/2018 for acute hypoxic/hypercapnic respiratory  failure secondary to interstitial lung disease.    FINDINGS:    Chest:    Post surgical changes of bilateral lung transplant. Median sternotomy  wires seen which apparent intact. Left IJ central venous catheter tip  is located at the innominate confluence.Bilateral pleural drains from  subxiphoid approach, with introduced air into the associated  subcutaneous tissues and small bilateral anterior pneumothoraces.    Marked decompression of both atria, as well as significant  decompression of the right ventricle. Hypoattenuation of the blood  pool relative to the myocardium and walls of the aorta, compatible  with anemia. Mild aortic arch calcifications. Mild scattered blood  products and foci of gas noted throughout the mediastinum. There is a  small to moderate left-sided pleural effusion containing  heterogeneous, hyperdense material compatible with blood products;  cannot exclude active extravasation this unenhanced examination. Small  loculation of pleural fluid over the lateral pleural space. Simple  fluid attenuating small to moderate right pleural effusion. Mild  angulation and narrowing at the left pulmonary arterial anastomosis.  Airway anastomoses are patent though there is mild/moderate narrowing  on the right (series 2, image 46 and series 5, image 54). Bibasilar  atelectasis. Normal caliber great vessels. Extensive bibasilar  compressive atelectasis. Trace gas in both pleural spaces.  Incidentally noted azygos fissure.    Abdomen and pelvis:      Feeding tube tip is located in the second portion of the duodenum.    Interposition of large bowel loops between the liver and diaphragm on  the right. Otherwise no abnormally dilated loops of bowel. Tiny  calcified gallstones. Normal appearance of the liver, spleen, adrenal  glands, kidneys, and pancreas. Decompressed urinary bladder drained by  Basilio catheter with small amount of air, likely iatrogenically  introduced. Small amount of dense material in the urinary bladder,  dependently on the right, potentially consolidation/stone material.  Small free fluid in the pelvis. Small fibroid anteriorly at the  uterine fundus. Uterus otherwise appears unremarkable. No adnexal  mass. Periuterine vascular calcifications. Abdominal aortic aneurysm.  No bowel obstruction.    Bones and soft tissues:   Bones are osteopenic. Degenerative changes of the spine. Mediastinal  wires which otherwise appear unremarkable. No visualized retrosternal  hematoma. Diffuse anasarca.      Impression    Impression:  1. Postoperative changes of bilateral lung presentation with tiny  anterior bilateral pneumothoraces and minimal scattered foci of  pneumomediastinum, likely post surgical.  2. Small to moderate left pleural effusion containing hyperdense blood  products; cannot exclude active extravasation on this unenhanced  examination. Small simple appearing right pleural effusion. Associated  atelectasis bilaterally.  3. Significant biatrial decompression indicative of hypovolemia. No  significant pericardial fluid to suggest constrictive physiology,  although mild blood products are noted in the mediastinum, potentially  postsurgical.  4. Mild free fluid in the pelvis, otherwise no acute abdominal  findings.  5. Additional incidental findings as described above.    [Urgent Result: Left hemothorax]    Finding was identified on 3/8/2018 9:12 AM.     Dr. Schwartz was contacted by Dr. Gao at 3/8/2018 9:22 AM and  verbalized understanding  of the urgent finding.     I have personally reviewed the examination and initial interpretation  and I agree with the findings.    RAUL ESCOBAR MD   XR Abdomen Port 1 View    Narrative    Study: XR ABDOMEN PORT 1 VW 3/8/2018 12:21 PM    Comparison: CT images from 3/8/2018.    History: Verify small bowel feeding tube bedside placement;     Findings:   Portable supine radiograph of the abdomen was obtained and reviewed.  Portion of lower abdomen collimated out of view. Feeding tube tip  projects over the junction of the second and third portion the  duodenum. OG tube tip and sidehole project over the stomach. Bilateral  chest tubes, left IJ central venous line with tip at the junction of  the left innominate and SVC. Surgical clips the mediastinum and right  upper lung field. Air-filled loops of nondistended small and large  bowel. No pneumatosis. No portal venous gas. Mediastinal prominence.  Bibasilar atelectasis.      Impression    Impression: Feeding tube tip projects at the junction of the second  and third portion of duodenum.    I have personally reviewed the examination and initial interpretation  and I agree with the findings.    CHAUNCEY HUNTER MD ([place])   XR Chest Port 1 View    Narrative    Preliminary report:  This is a preliminary resident interpretation. Full report to follow.           Impression    IMPRESSION:   1. Stable support devices.  2. Unchanged small right pleural effusion and small left hemothorax,  with associated atelectasis/consolidation.         =========================================

## 2018-03-09 NOTE — PLAN OF CARE
Problem: Patient Care Overview  Goal: Plan of Care/Patient Progress Review  PT / 4E -    Discharge Planner PT   Patient plan for discharge: rehab  Current status: Performing sit->stand transfer within precautions with rocking motion + Mod A x 2.  Performed pre-gait activity; marching in places and 10 steps forward/backwards.  Performed step-pivot transfer from bed to chair with Mod A x 2 and FWW.   Barriers to return to prior living situation: activity tolerance, strength, sternal precautions  Recommendations for discharge: ARU  Rationale for recommendations: Pt has need for multiple disciplines, is motivate to return to independence and has medical complexity.        Entered by: Rona Ling 03/09/2018 4:18 PM

## 2018-03-09 NOTE — PROGRESS NOTES
CLINICAL NUTRITION SERVICES - brief note. See 3/7 note for full assessment.    -FEN/GI: GI status much improved, NGT removed today. Stool volumes decreasing    Interventions  Collaboration with other providers - Discussed in rounds, OK to restart TF today  Enteral Nutrition - adjusted orders to restart TF @ goal       RD will continue to follow.    Natalia Sargent RD, LD, [facility]  CVICU Dietitian  Pager: [phone]

## 2018-03-09 NOTE — PLAN OF CARE
Problem: Patient Care Overview  Goal: Plan of Care/Patient Progress Review  Outcome: Improving  D: Lung transplant  I/A: Pt was AOx4 today. Pt complaining of abdominal pain this morning, CT of chest and abdomen done. NG placed to low-intermittent suction for decompression per order. Abdominal pain subsided, otherwise pt denying pain today, no pain medication needed today. Pt afebrile. Pt in atrial fibrillation and sinus rhythm today, pt required levophed this morning for blood pressure. 1 unit PRBC's and albumin given this morning and levophed was turned off around 1115. Blood pressure stable this afternoon. Pt currently on room air, SpO2 >92%, tolerating well. Pt diuresed today, Urine output about 75mL/hr this afternoon. Tube feeding on hold due to CT results, D10 started and insulin drip continued. Pt requiring 1-1.5 units of insulin per hour. Heparin drip increased to 500 units per hour per MD. Tacrolimus drip continued today. Pt got up to chair twice today and worked with therapy x1, tolerated well.  P: Continue to monitor respiratory status closely. Report to be given to oncoming RN.

## 2018-03-09 NOTE — PROGRESS NOTES
Pulmonary Medicine  Cystic Fibrosis - Lung Transplant Daily Progress Note   March 9, 2018       Patient: Kecia Blue  MRN: [id number]  Transplant Date: 2/21/2018 (POD# 8)  Admission date: 2/21/2018  Hospital Day #16          Assessment and Plan:   Kecia Blue is a 56 y/o female w/PMHx significant for dermatomyositis (on immunosuppression), seronegative RA, ILD, and pulmonary hypertension who was admitted for emergent lung transplant w/u on 2/21 for increased SOB and hypoxia. Required V-A ECMO for right heart failure on 2/27. Now s/p bilateral sequential lung transplant on 3/1, ECMO decannulation on 3/3. Post-op course c/b hemodynamic instability requiring pressors and iFlolan. Was extubated O/N of 3/7, flolan d/c'd, off pressors, who is now sitting up in chair.     Plan Today:  --began valgancyclovir  --tacro level increased from 80 mcg/hr to 85 mcg/hr. If level stable tomorrow between 10-15, consider PO dosing at 3 and 3.5 mg  --Continue metoprolol 25mg BID  --Continue 100mL of 25% Albumin Q8H  --Goal fluid balance is -1.5L by tomorrow  --Change heparin gtt to 550U/hr (no titration)  --D/C NG tube  --Keep NJ tube  --Restart TFs   --Advance diet as tolerated  --OOB with mobilization  --CT management for left hemothorax per CVTS  --Continue Vancomycin and Zosyn per Pulmonary  --Follow-up OSH cultures  --Start bumex 1mg Q8H  --Plan for Transfer to the Floor tomorrow       ##S/p bilateral lung transplant on 3/1/18 for acute hypoxic/hypercapneic respiratory failure 2/2 ILD  ##Extubated 3/6/18  ##Pulm HTN 2/2 ILD  Adequate graft function. Weaned off iFlolan. On NC of 1.5 and oxygenating well.   - Antibiotics as noted below  - Agree with bumex dosing and albumin   - Continue aggressive pulmonary toilet with chest physiotherapy QID (acapella/incentive spirometry once extubated)  - Chest tube management per CVTS  - Explant pathology shows NSIP (see path report below in results)    - Agree with continuing  sildenafil    ##Small-mod L pleural effusion, likely hemothorax  Pt required 1 unit pRBCs O/N. Will CTM with imaging. If worsening, could require VATS.                                Continue 3-drug immunosuppression:  Goal tacrolimus target level 10-12  - IV tacrolimus @ 85 mcg/hr today and today's tacro level is 10.5. If level stable tomorrow between 10-15, consider PO dosing at 3 and 3.5 mg  - MMF 1500 g BID  - Prednisolone 12.9mg BID     Prophylaxis:   PJP: Bactrim  CMV: will begin vangancyclovir 900mg daily POD #8 (ordered)  Fungal: Nystatin       Donor Recipient Intervention   CMV status  Pos   Pos  Valgancyclovir POD#8   EBV status Pos Pos Immune    HSV status N/A  Pos Immune      ##Infectious disease  ##SIRS  No known infectious disease history. SIRS likely a response from multiple procedures (VA ECMO cannulation and decannulation) and transplant surgery. Fever curve improving. Negative recipient bronch culture from time of transplant. Pan cultured 3/4 NGTD.   - Don[facility] at [facility] growing MRSA -- continue vancomycin   - Donor culture at OSH preliminarily growing MSSA, Strep pneumoniae, Moraxella catarrhalis, Haemophilus species. Final sensitivities on donor cultures pending. Continue pip/tazo at this time.     ##Nutrtition  -restart TF; ADAT  -Speech ok with nectar-thickened full liquid diet    ##Diarrhea  ##Large bowel loops on imaging  ##Pain on defecation--could be anal fissure vs hemorrhoid  Could be 2/2 zosyn or MMF. C diff neg.    --NG tube d/c.   --ADAT     --desitin cream daily BID when removing anal pouch    ##LUE DVT, provoked  Found on U/S on 3/7, started on heparin drip on 3/7, cont hep.      We appreciate the excellent care provided by the ICU team. We will continue to follow along closely, please do not hesitate to call with any questions or concerns.     Patient discussed with Dr. Christensen.    Alessandra Lombardi MD PhD   Internal Medicine, PGY 1  p             Subjective & Interval  History:     Last 24 hour vital signs, labs and care team notes reviewed.    Remains on nasal cannula.Sitting up in bed, having conversations. Says she is doing well and enjoyed seeing her grand kids yesterday.            Review of Systems:     C: negative for fever, chills, change in weight, change in appetite  INTEGUMENTARY/SKIN: neg for rash, no other new lesions  ENT/MOUTH: no sore throat, no sinus pain, no nasal drainage  RESP: see interval history  CV: no chest pain, no palpitations, + peripheral edema, no orthopnea  GI: no nausea, no vomiting, no reflux symptoms, no change in stools  : no dysuria  MUSCULOSKELETAL: no myalgias, no arthralgias  ENDOCRINE: blood sugars with adequate control  NEURO: no headache, no numbness or tingling  PSYCHIATRIC: mood stable          Medications:     Active Medications:    bumetanide  1 mg Intravenous Q8H     albumin human  25 g Intravenous Q8H     valGANciclovir  450 mg Oral or Feeding Tube Daily     [START ON 3/10/2018] sulfamethoxazole-trimethoprim  1 tablet Oral or NG Tube Daily     multivitamin, therapeutic with minerals  1 tablet Oral or Feeding Tube Daily     zinc oxide   Topical BID     metoprolol tartrate  25 mg Oral BID     piperacillin-tazobactam  4.5 g Intravenous Q6H     pantoprazole  40 mg Oral or Feeding Tube Daily     QUEtiapine  50 mg Oral At Bedtime     vancomycin (VANCOCIN) IV  1,000 mg Intravenous Q24H     levalbuterol  2 puff Inhalation Q4H JULES     sildenafil  10 mg Oral or Feeding Tube Q8H     nystatin  1,000,000 Units Swish & Swallow 4x Daily     mycophenolate  1,500 mg Oral BID IS    Or     mycophenolate  1,500 mg Oral or NG Tube BID IS     prednisoLONE  0.25 mg/kg (Dosing Weight) Oral or NG Tube BID     Active PRN Medications:  sennosides, potassium phosphate (KPHOS) in D5W IV, potassium phosphate (KPHOS) in D5W IV, potassium phosphate (KPHOS) in D5W IV, potassium phosphate (KPHOS) in D5W IV, potassium phosphate (KPHOS) in D5W IV, heparin, artificial  tears, metoprolol, IV fluid REPLACEMENT ONLY, naloxone, acetaminophen, oxyCODONE IR, ondansetron **OR** ondansetron, IV fluid REPLACEMENT ONLY, glucose **OR** dextrose **OR** glucagon, potassium chloride, potassium chloride, potassium chloride, potassium chloride with lidocaine, potassium chloride, magnesium sulfate, magnesium sulfate         Physical Exam:     Constitutional: On Nasal cannula,up in chair on exam, able to answer questions with sense of humor. In no apparent distress.   HEENT: Eyes with pink conjunctivae, no scleral jaundice. Neck supple without lymphadenopathy. NC in nares.   PULM: Good air flow bilaterally. Clear lung sounds.    CV: Normal S1 and S2. RRR. No murmur, gallop, or rub. Trace LE edema. Peripheral pulses intact.    ABD: Hypoactive BS, soft, nontender, nondistended.   MSK: Moves all extremities. No apparent muscle wasting. Cap refill < 5 seconds. Upper extremity on L appears more swollen than right without pitting edema. Hands and feet are warm bilaterally, and toes are pink, bilaterally.   NEURO:  Following simple commands. Answers questions.   SKIN: No pruritus or rash on limited exam. ?     Lines, Drains, and Devices:  Peripheral IV 02/21/18 Left;Lateral Upper forearm (Active)   Site Assessment WDL 3/6/2018 12:00 PM   Line Status Infusing 3/6/2018 12:00 PM   Phlebitis Scale 0-->no symptoms 3/6/2018 12:00 PM   Infiltration Scale 0 3/6/2018 12:00 PM   Infiltration Site Treatment Method  None 3/5/2018  4:00 AM   Extravasation? No 3/6/2018 12:00 PM   Dressing Intervention New dressing  3/3/2018  4:00 AM   IV Site Rotation Due Date 02/25/18 3/2/2018  4:00 AM   Reason Not Rotated Poor venous access 3/2/2018  4:00 AM   Number of days:13       Arterial Line 02/27/18 Femoral (Active)   Site Assessment WDL 3/6/2018 12:00 PM   Line Status Pulsatile blood flow 3/6/2018 12:00 PM   Art Line Waveform Appropriate 3/6/2018 12:00 PM   Art Line Interventions Leveled 3/6/2018 12:00 PM  "  Color/Movement/Sensation Capillary refill less than 3 sec 3/6/2018 12:00 PM   Dressing Type Transparent 3/6/2018 12:00 PM   Dressing Status Clean, dry, intact 3/6/2018 12:00 PM   Dressing Intervention Dressing changed/new dressing 3/6/2018  4:00 AM   Dressing Change Due 03/11/18 3/6/2018 12:00 PM   Number of days:7       CVC Double Lumen 03/01/18 (Active)   Site Assessment WDL 3/6/2018 12:00 PM   Extravasation? No 3/6/2018 12:00 PM   Dressing Intervention Transparent;Chlorhexidine sponge 3/6/2018 12:00 PM   Dressing Change Due 03/11/18 3/6/2018 12:00 PM   CVC Comment Cordis 3/5/2018  8:00 AM   CVC Lumen Assessment White;Brown;Blue 3/2/2018  4:00 AM   Number of days:5       Pulmonary Artery Catheter - Single Lumen 03/03/18 1000 Left internal jugular vein continuous hemodynamic catheter (Active)   Dressing dressing dry and intact 3/6/2018  8:00 AM   Securement secured with sutures 3/6/2018  8:00 AM   PA Catheter Markings (cm) 51 3/6/2018  8:00 AM   Phlebitis 0-->no symptoms 3/6/2018  8:00 AM   Infiltration 0-->no symptoms 3/6/2018  8:00 AM   Waveform normal 3/6/2018  8:00 AM   Balloon Inflation Volume to Obtain Wedge Tracing (mL) 0 3/4/2018  4:00 AM   Pressure Catheter Interventions blood specimen obtained and sent to lab 3/6/2018  8:00 AM   Daily Review Of Necessity completed 3/6/2018  8:00 AM   Number of days:3            Data:     All vital signs, laboratory and imaging data for the past 24 hours reviewed.      Vital signs:  Temp: 97.7  F (36.5  C) Temp src: Oral BP: 105/74   Heart Rate: 105 Resp: 28 SpO2: 97 % O2 Device: None (Room air) Oxygen Delivery: 2 LPM Height: 160 cm (5' 3\") Weight: 60.7 kg (133 lb 13.1 oz)    Weight trend:   Vitals:    03/07/18 0400 03/08/18 0400 03/09/18 0415   Weight: 59.7 kg (131 lb 9.8 oz) 60.7 kg (133 lb 13.1 oz) 60.7 kg (133 lb 13.1 oz)        I/O:   Intake/Output Summary (Last 24 hours) at 03/06/18 1411  Last data filed at 03/06/18 1400   Gross per 24 hour   Intake          " 3809.61 ml   Output             3095 ml   Net           714.61 ml       Labs    CMP:   Recent Labs  Lab 03/09/18  0430 03/08/18  0403 03/07/18  1757 03/07/18  1150 03/07/18  0354 03/07/18  0010  03/06/18  0402  03/03/18  0329    142  --   --  146* 146*  --  151*  < > 147*   POTASSIUM 3.5 3.9 4.1 4.0 3.8 4.3  < > 4.2  < > 4.2   CHLORIDE 102 107  --   --  112* 114*  --  120*  < > 117*   CO2 27 24  --   --  25 23  --  20  < > 20   ANIONGAP 10 11  --   --  9 10  --  11  < > 10   * 147*  --   --  169* 208*  --  138*  < > 168*   BUN 42* 48*  --   --  57* 63*  --  70*  < > 53*   CR 1.41* 1.33*  --   --  1.40* 1.50*  --  1.65*  < > 1.80*   GFRESTIMATED 39* 41*  --   --  39* 36*  --  32*  < > 29*   GFRESTBLACK 47* 50*  --   --  47* 44*  --  39*  < > 35*   CHELSEY 7.8* 6.8*  --   --  7.7* 7.5*  --  7.4*  < > 7.1*   MAG 2.0 2.2 2.5* 1.9 2.5*  --   < > 2.3  < > 2.1   PHOS 3.5 2.8  --  3.0 2.4*  --   < > 3.3  < > 4.4   PROTTOTAL  --   --   --   --   --   --   --   --   --  4.0*   ALBUMIN 3.1* 1.8*  --   --  2.0*  --   --  2.1*  < > 2.4*   BILITOTAL  --   --   --   --   --   --   --   --   --  1.5*   ALKPHOS  --   --   --   --   --   --   --   --   --  27*   AST  --   --   --   --   --   --   --   --   --  34   ALT 21 28  --   --  39  --   --  42  < > 29   < > = values in this interval not displayed.  CBC:     Recent Labs  Lab 03/09/18  0430 03/08/18  1635 03/08/18  0403 03/07/18  1150 03/07/18  0354   WBC 15.0*  --  17.8* 16.7* 17.7*   RBC 2.51*  --  2.32* 2.51* 2.71*   HGB 7.6* 8.8* 7.1* 7.8* 8.4*   HCT 23.4*  --  22.0* 24.3* 26.0*   MCV 93  --  95 97 96   MCH 30.3  --  30.6 31.1 31.0   MCHC 32.5  --  32.3 32.1 32.3   RDW 16.9*  --  16.8* 16.9* 16.7*     --  178 157 162     INR:     Recent Labs  Lab 03/05/18  1648 03/05/18  1132 03/05/18  0842 03/05/18  0355   INR 1.11 1.13 1.13 1.15*     Glucose:   Recent Labs  Lab 03/09/18  1624 03/09/18  1433 03/09/18  1142 03/09/18  0929 03/09/18  0807 03/09/18  0704   03/09/18  0430  03/08/18  0403  03/07/18  0354  03/07/18  0010  03/06/18  0402  03/05/18  1648   GLC  --   --   --   --   --   --   --  127*  --  147*  --  169*  --  208*  --  138*  --  160*   * 172* 139* 86 90 161*  < >  --   < >  --   < >  --   < >  --   < >  --   < >  --    < > = values in this interval not displayed.  Blood Gas:   Recent Labs  Lab 03/07/18  0355 03/07/18  0354 03/07/18  0010  03/06/18 2039   PHV 7.43  --  7.42  --  7.37   PCO2V 41  --  42  --  45   PO2V 31  --  32  --  33   HCO3V 28  --  27  --  26   LASHAUN 3.1  --  2.4  --  0.7   O2PER 2L 2L 2L  < > 3L   < > = values in this interval not displayed.  Culture Data     Recent Labs  Lab 03/06/18  1330 03/04/18  2057 03/04/18  1417 03/04/18  1411 03/03/18  0314   CULT Culture negative after 3 days  No growth No growth Culture negative monitoring continues Culture negative monitoring continues No growth     Virology Data:   Lab Results   Component Value Date    FLUAH1 Negative 02/27/2018    FLUAH3 Negative 02/27/2018    [id number] Negative 02/27/2018    IFLUB Negative 02/27/2018    RSVA Negative 02/27/2018    RSVB Negative 02/27/2018    PIV1 Negative 02/27/2018    PIV2 Negative 02/27/2018    PIV3 Negative 02/27/2018    HMPV Negative 02/27/2018    HRVS Negative 02/27/2018    ADVBE Negative 02/27/2018    ADVC Negative 02/27/2018     Historical CMV results (last 3 of prior testing):  Lab Results   Component Value Date    CMVQNT Canceled, Test credited (A) 03/01/2018    CMVQNT Canceled, Test credited (A) 03/01/2018     Lab Results   Component Value Date    CMVLOG Canceled, Test credited 03/01/2018    CMVLOG Canceled, Test credited 03/01/2018     Urine Studies    Recent Labs   Lab Test  03/04/18   1425   URINEPH  5.5   NITRITE  Negative   LEUKEST  Negative   WBCU  5     Explant Pathology  FINAL DIAGNOSIS:   A. LUNG, LEFT NATIVE, PNEUMONECTOMY:   - Nonspecific interstitial pneumonitis (NSIP) with large areas of   end-stage fibrosis, [place]-airway foreign  body   giant cell reaction with cholesterol clefts suggestive of aspiration, and   foci or organizing pneumonia.   - Six benign hilar lymph nodes.     B. LUNG, RIGHT NATIVE, PNEUMONECTOMY:   - Nonspecific interstitial pneumonitis (NSIP) with large areas of   end-stage fibrosis, [place]-airway foreign body   giant cell reaction with cholesterol clefts suggestive of aspiration, and   foci or organizing pneumonia.   - Seven benign hilar lymph nodes.         Physician Attestation   I, Tarik Christensen, saw this patient with  and agree with the findings and plan of care as documented in the above note.      I personally reviewed vital signs, medications, labs and imaging.    Tarik Christensen  Date of Service (when I saw the patient): 03/09/18

## 2018-03-09 NOTE — PLAN OF CARE
Problem: Patient Care Overview  Goal: Plan of Care/Patient Progress Review  Outcome: Improving  D: ILD s/p [facility] Lung transplant.  I/A: Pt. A&O x4. Afebrile. NSR 90's.  BP MAPs >65. Lasix and Metoprolol doses split and one 12.5g albumin given to maintain BP goal.   RR high 20s- low30. O2sat mid to high 90's on RA.. BBS Clear/diminished. Very weak cough. Pulmonary hygiene promoted. Pt NPO except meds in post pyloric FT. NG to LIWS. UOP adequate per Basilio. Rectal pouch intact.  Plan: Continue plan of care. Continue to titrate heparin and insulin GTTs. Continue anti-rejection regimen.

## 2018-03-09 NOTE — PROGRESS NOTES
CVTS PROGRESS NOTE  03/09/2018    ASSESSMENT: Kecia Blue is a 57 year old female with PMH significant for dermatomyositis on immunosuppression, eronegative RA, ILD, and pulmonary hypertension, who was admitted for emergent lung transplant work-up on 2/21 for increasing shortness of breath and hypoxia now s/p emergent [facility] ECMO placement on 2/27/18. Now s/p bilateral lung transplant on 3/1 and VA ECMO decannulation on 3/3 with Dr. Hernandez.      TODAY'S PROGRESS/PLAN  - Continue metoprolol 25mg BID  - Remove NG tube and leave NJ feeding tube.  - Resume TFs and ADAT  - SLP to eval for diet clearance  - Cont 25% albumin 25g q8h  - Change lasix to bumex 1mg Q8H  - NET I/O goal negative 1.5L over 24 hours  - Switch to straight rate heparin 550 units/hour    PLAN:  Neuro/ pain/ sedation:  # Postoperative acute pain  # Sedation for mechanical ventilation - extubated  - Monitor neurological status. Notify the MD for any acute changes in exam.  - PRN tylenol/oxycodone.     Pulmonary care:  # Acute on chronic respiratory failure, worsening, s/p bilateral lung transplantation 3/1/2018  # ILD related to dermatomyositis   # Severe pulmonary hypertension (WHO class III)  # Pneumomediastinum, 2/22/2018  # VA ECMO decannulated 3/3   - Supplemental oxygen to keep saturation above 92%.  - S/p Alan and Flolan for RV support    Cardiovascular:  # Right heart failure  # Right ventricular hypertrophy  # Pulmonary HTN  # Postoperative atrial fibrillation with RVR - sinus between  - Monitor hemodynamic status.   - Start Metoprolol 12.5 TID->25mg BID  - More noted hemodynamic instability with A fib, increase in metoprolol for BP control    GI care:  #Constipation - having BMs  #Diarrhea - resolving  - NPO, ADAT as able per speech eval.  - NG out with decompression of abdominal air  - NJ placed  - Bowel regimen    Fluids/ Electrolytes/ Nutrition:  #Hypernatremia   - Advance TFs to goal  - ICU electrolyte protocol  - D10W at 50cc/hr  if not on feeds  - FWF at 30cc/4 hours    Renal/ Fluid Balance:  #Acute kidney injury  #Volume overload  - Urine output is adequate so far with change to Bumex.  - Cr improving  - Diurese with lasix for net negative goal 1000mL negative  - Will continue to monitor intake and output.    Endocrine:  #Stress hyperglycemia    - Insulin gtt     ID/ Antibiotics:  #Febrile  - Anti-rejection and immunosuppressant medications per transplant team  - Donor lungs with MRSA, coverage for 2 weeks with vanco/zosyn until donor cultures return.   - Follow cultures    Heme:  #Acute blood loss anemia   #Acute LUE DVT on ultrasound   - Hemoglobin stable 7.1, transfused 1u PRBC 3/8  - Monitor Hgb and Plts, transfuse if Hgb < 7.   - Low-intensity dose heparin for DVT per surgery teams    Prophylaxis:    - Mechanical prophylaxis for DVT.   - Heparin SubQ TID.  - PPI    Lines/ tubes/ drains:  - NJ, CVC, PIV, Basilio, Chest tubes    Disposition:  - CVICU. Possible transfer to floor Sat/Sun.     Seen with Dr. Marvin.    Kwesi Schwartz MD  Anesthesiology Resident CA2, PGY3      ====================================    TODAY'S PROGRESS:   SUBJECTIVE:   - Given 25% albumin overnight for soft pressures between lasix and metoprolol dosing.  - Received 2mg bumex for marginal UOP (responded).  - Had good night, able to sleep.   - On room air, comfortable.   - Otherwise no acute issues.     OBJECTIVE:   1. VITAL SIGNS:   Temp:  [97.8  F (36.6  C)-98.9  F (37.2  C)] 97.8  F (36.6  C)  Heart Rate:  [] 102  Resp:  [28-32] 28  BP: ()/(45-98) 98/68  SpO2:  [86 %-100 %] 100 %  Resp: 28    2. INTAKE/ OUTPUT:   I/O last 3 completed shifts:  In: 2680.64 [I.V.:1950.64; NG/GT:450]  Out: 3810 [Urine:2560; Emesis/NG output:400; Stool:150; Chest Tube:700]    3. PHYSICAL EXAMINATION:   General: Alert, NAD  Neuro: Alert, NAD, moves extremities, follows commands  Resp: Eequal breath sounds, somewhat diminished at bases.  CV: sinus, normocardic per tele. CTs  with s/s drainage.  Abdomen: Soft, mildly distended  Incisions: c/d/i  Extremities: warm, toes dusky bilaterally, cap refill < 2 seconds, +doppler signals bilateral PT. LUE with increased dusky color vs right side improved over previous    4. INVESTIGATIONS:   Arterial Blood Gases     Recent Labs  Lab 03/07/18  0354 03/06/18  2208 03/06/18  1905 03/06/18  1822   PH 7.47* 7.45 7.43 7.43   PCO2 35 36 36 36   PO2 113* 159* 156* 142*   HCO3 26 25 24 24     Complete Blood Count     Recent Labs  Lab 03/09/18  0430 03/08/18  1635 03/08/18  0403 03/07/18  1150 03/07/18  0354   WBC 15.0*  --  17.8* 16.7* 17.7*   HGB 7.6* 8.8* 7.1* 7.8* 8.4*     --  178 157 162     Basic Metabolic Panel    Recent Labs  Lab 03/09/18  0430 03/08/18  0403 03/07/18  1757 03/07/18  1150 03/07/18  0354 03/07/18  0010    142  --   --  146* 146*   POTASSIUM 3.5 3.9 4.1 4.0 3.8 4.3   CHLORIDE 102 107  --   --  112* 114*   CO2 27 24  --   --  25 23   BUN 42* 48*  --   --  57* 63*   CR 1.41* 1.33*  --   --  1.40* 1.50*   * 147*  --   --  169* 208*     Liver Function Tests    Recent Labs  Lab 03/09/18  0430 03/08/18  0403 03/07/18  0354 03/06/18  0402 03/05/18  1648 03/05/18  1132 03/05/18  0842 03/05/18  0355  03/03/18  0329   AST  --   --   --   --   --   --   --   --   --  34   ALT 21 28 39 42  --   --  40  --   < > 29   ALKPHOS  --   --   --   --   --   --   --   --   --  27*   BILITOTAL  --   --   --   --   --   --   --   --   --  1.5*   ALBUMIN 3.1* 1.8* 2.0* 2.1*  --   --   --  2.1*  < > 2.4*   INR  --   --   --   --  1.11 1.13 1.13 1.15*  < > 1.36*   < > = values in this interval not displayed.  Pancreatic Enzymes  No lab results found in last 7 days.  Coagulation Profile    Recent Labs  Lab 03/05/18  1648 03/05/18  1132 03/05/18  0842 03/05/18  0355   INR 1.11 1.13 1.13 1.15*   PTT 24 27 25 26     Lactate  Invalid input(s): LACTATE    5. RADIOLOGY:   Recent Results (from the past 24 hour(s))   XR Abdomen Port 1 View     Narrative    Study: XR ABDOMEN PORT 1 VW 3/8/2018 12:21 PM    Comparison: CT images from 3/8/2018.    History: Verify small bowel feeding tube bedside placement;     Findings:   Portable supine radiograph of the abdomen was obtained and reviewed.  Portion of lower abdomen collimated out of view. Feeding tube tip  projects over the junction of the second and third portion the  duodenum. OG tube tip and sidehole project over the stomach. Bilateral  chest tubes, left IJ central venous line with tip at the junction of  the left innominate and SVC. Surgical clips the mediastinum and right  upper lung field. Air-filled loops of nondistended small and large  bowel. No pneumatosis. No portal venous gas. Mediastinal prominence.  Bibasilar atelectasis.      Impression    Impression: Feeding tube tip projects at the junction of the second  and third portion of duodenum.    I have personally reviewed the examination and initial interpretation  and I agree with the findings.    CHAUNCEY HUNTER MD ([place])   XR Chest Port 1 View    Narrative    EXAM: XR CHEST PORT 1 VW  3/9/2018 8:05 AM      HISTORY: s/p lung transplant;     COMPARISON: CT 3/8/2018    FINDINGS: Portable AP view of the chest obtained. Enteric tube courses  beyond the margins of the film. Left IJ central line tip projects over  the distal nondominant vein. Postsurgical changes of bilateral lung  transplantation, including median sternotomy wires and surgical clips.  Unchanged right basilar chest tube and left apical chest tube.     Trachea is midline. The cardiac silhouette is unchanged. Small right  pleural effusion and small left hemothorax. The previously seen trace  pneumothoraces on CT 3/8/2018 are not well visualized on this study,  likely due to differences in technique. Unchanged streaky bibasilar  opacities. Unchanged prominent gas-filled loops of small bowel.      Impression    IMPRESSION:   1. Stable support devices.  2. Unchanged small right pleural  effusion and small left hemothorax,  with associated atelectasis/consolidation.    I have personally reviewed the examination and initial interpretation  and I agree with the findings.    ADAM GONZALEZ MD       =========================================

## 2018-03-09 NOTE — PROGRESS NOTES
I personally saw, examined and evaluated the patient. I agree with the above documentation, assessment and plan, as outlined in the Resident/Fellow/NP/PA's note.    Assessment: 57 year old female with PMH significant for dermatomyositis on immunosuppression, RA, ILD, and pulmonary hypertension, who was admitted for emergent lung transplant work-up on 2/21 for increasing shortness of breath and hypoxia. S/P emergent [facility] ECMO placement on 2/27/18. S/P bilateral lung transplant on 3/1.    Critical Care Diagnoses:        1. Vasoplegic Shock - improving                                                  2. Cardiogenic Shock - improving                                                  3. Atrial Fibrillation with Rapid Ventricular Response - resolved                                                  4. Pulmonary HTN - stable                                                  5. Acute Right Ventricular Heart Failure - resolved                                                  6. Volume Overload with Intravascular Hypovolemia                                                  7. Diarrhea - resolved                                                  8. Abdominal Pain with Gastric Distention - resolved                                                                   9. Concern for Protein-Calorie Malnutrition                                                 10. Acute Renal Failure with Hyperchloremia - improving                                                 11. Left Hemothorax - stable    Plan:     1. Continue metoprolol 25mg BID  2. Continue 100mL of 25% Albumin Q8H  3. Goal fluid balance is -1.5L by tomorrow  4. Change heparin gtt to 550U/hr (no titration)  5. D/C NG tube  6. Keep NJ tube  7. Restart TFs   8. Advance diet as tolerated  9. OOB with mobilization  10. CT management for left hemothorax per CVTS  11. Continue Vancomycin and Zosyn per Pulmonary  12. Follow-up OSH cultures  13. Start bumex 1mg Q8H  14. Plan for Transfer to  the Floor tomorrow      Total Critical Care Time: 35 minutes    Dr. Shree Grant M.D.

## 2018-03-10 ENCOUNTER — APPOINTMENT (OUTPATIENT)
Dept: OCCUPATIONAL THERAPY | Facility: CLINIC | Age: 58
DRG: 003 | End: 2018-03-10
Attending: INTERNAL MEDICINE
Payer: COMMERCIAL

## 2018-03-10 ENCOUNTER — APPOINTMENT (OUTPATIENT)
Dept: PHYSICAL THERAPY | Facility: CLINIC | Age: 58
DRG: 003 | End: 2018-03-10
Attending: INTERNAL MEDICINE
Payer: COMMERCIAL

## 2018-03-10 ENCOUNTER — APPOINTMENT (OUTPATIENT)
Dept: SPEECH THERAPY | Facility: CLINIC | Age: 58
DRG: 003 | End: 2018-03-10
Attending: INTERNAL MEDICINE
Payer: COMMERCIAL

## 2018-03-10 ENCOUNTER — APPOINTMENT (OUTPATIENT)
Dept: GENERAL RADIOLOGY | Facility: CLINIC | Age: 58
DRG: 003 | End: 2018-03-10
Attending: INTERNAL MEDICINE
Payer: COMMERCIAL

## 2018-03-10 LAB
ALBUMIN SERPL-MCNC: 3.3 G/DL (ref 3.4–5)
ALT SERPL W P-5'-P-CCNC: 22 U/L (ref 0–50)
ANION GAP SERPL CALCULATED.3IONS-SCNC: 12 MMOL/L (ref 3–14)
AT III ACT/NOR PPP CHRO: 75 % (ref 85–135)
BASOPHILS # BLD AUTO: 0 10E9/L (ref 0–0.2)
BASOPHILS NFR BLD AUTO: 0.1 %
BUN SERPL-MCNC: 47 MG/DL (ref 7–30)
CALCIUM SERPL-MCNC: 8.1 MG/DL (ref 8.5–10.1)
CHLORIDE SERPL-SCNC: 104 MMOL/L (ref 94–109)
CO2 SERPL-SCNC: 26 MMOL/L (ref 20–32)
CREAT SERPL-MCNC: 1.51 MG/DL (ref 0.52–1.04)
D DIMER PPP FEU-MCNC: 2.3 UG/ML FEU (ref 0–0.5)
D DIMER PPP FEU-MCNC: 2.5 UG/ML FEU (ref 0–0.5)
DIFFERENTIAL METHOD BLD: ABNORMAL
EOSINOPHIL # BLD AUTO: 0.1 10E9/L (ref 0–0.7)
EOSINOPHIL NFR BLD AUTO: 0.4 %
ERYTHROCYTE [DISTWIDTH] IN BLOOD BY AUTOMATED COUNT: 17.3 % (ref 10–15)
GFR SERPL CREATININE-BSD FRML MDRD: 36 ML/MIN/1.7M2
GLUCOSE BLDC GLUCOMTR-MCNC: 113 MG/DL (ref 70–99)
GLUCOSE BLDC GLUCOMTR-MCNC: 122 MG/DL (ref 70–99)
GLUCOSE BLDC GLUCOMTR-MCNC: 124 MG/DL (ref 70–99)
GLUCOSE BLDC GLUCOMTR-MCNC: 125 MG/DL (ref 70–99)
GLUCOSE BLDC GLUCOMTR-MCNC: 134 MG/DL (ref 70–99)
GLUCOSE BLDC GLUCOMTR-MCNC: 138 MG/DL (ref 70–99)
GLUCOSE BLDC GLUCOMTR-MCNC: 138 MG/DL (ref 70–99)
GLUCOSE BLDC GLUCOMTR-MCNC: 139 MG/DL (ref 70–99)
GLUCOSE BLDC GLUCOMTR-MCNC: 141 MG/DL (ref 70–99)
GLUCOSE BLDC GLUCOMTR-MCNC: 145 MG/DL (ref 70–99)
GLUCOSE BLDC GLUCOMTR-MCNC: 148 MG/DL (ref 70–99)
GLUCOSE BLDC GLUCOMTR-MCNC: 161 MG/DL (ref 70–99)
GLUCOSE SERPL-MCNC: 132 MG/DL (ref 70–99)
HCT VFR BLD AUTO: 24.1 % (ref 35–47)
HCT VFR BLD AUTO: 26.6 % (ref 35–47)
HGB BLD-MCNC: 7.6 G/DL (ref 11.7–15.7)
HGB BLD-MCNC: 8.6 G/DL (ref 11.7–15.7)
HGB FREE PLAS-MCNC: <30 MG/DL
IMM GRANULOCYTES # BLD: 0.1 10E9/L (ref 0–0.4)
IMM GRANULOCYTES NFR BLD: 0.3 %
LMWH PPP CHRO-ACNC: <0.1 IU/ML
LYMPHOCYTES # BLD AUTO: 0.6 10E9/L (ref 0.8–5.3)
LYMPHOCYTES NFR BLD AUTO: 3.6 %
MAGNESIUM SERPL-MCNC: 1.7 MG/DL (ref 1.6–2.3)
MCH RBC QN AUTO: 29.8 PG (ref 26.5–33)
MCHC RBC AUTO-ENTMCNC: 31.5 G/DL (ref 31.5–36.5)
MCV RBC AUTO: 95 FL (ref 78–100)
MONOCYTES # BLD AUTO: 0.6 10E9/L (ref 0–1.3)
MONOCYTES NFR BLD AUTO: 3.5 %
NEUTROPHILS # BLD AUTO: 15.1 10E9/L (ref 1.6–8.3)
NEUTROPHILS NFR BLD AUTO: 92.1 %
NRBC # BLD AUTO: 0 10*3/UL
NRBC BLD AUTO-RTO: 0 /100
OSMOLALITY STL: 375 MOSM/KG (ref 280–303)
PHOSPHATE SERPL-MCNC: 3.2 MG/DL (ref 2.5–4.5)
PLATELET # BLD AUTO: 184 10E9/L (ref 150–450)
POTASSIUM SERPL-SCNC: 3.6 MMOL/L (ref 3.4–5.3)
RBC # BLD AUTO: 2.55 10E12/L (ref 3.8–5.2)
SODIUM SERPL-SCNC: 142 MMOL/L (ref 133–144)
VANCOMYCIN SERPL-MCNC: 26.4 MG/L
WBC # BLD AUTO: 16.4 10E9/L (ref 4–11)

## 2018-03-10 PROCEDURE — 85014 HEMATOCRIT: CPT | Performed by: STUDENT IN AN ORGANIZED HEALTH CARE EDUCATION/TRAINING PROGRAM

## 2018-03-10 PROCEDURE — 40000133 ZZH STATISTIC OT WARD VISIT

## 2018-03-10 PROCEDURE — 25000132 ZZH RX MED GY IP 250 OP 250 PS 637: Performed by: PHYSICIAN ASSISTANT

## 2018-03-10 PROCEDURE — 71045 X-RAY EXAM CHEST 1 VIEW: CPT

## 2018-03-10 PROCEDURE — 83051 HEMOGLOBIN PLASMA: CPT | Performed by: THORACIC SURGERY (CARDIOTHORACIC VASCULAR SURGERY)

## 2018-03-10 PROCEDURE — 40000193 ZZH STATISTIC PT WARD VISIT: Performed by: REHABILITATION PRACTITIONER

## 2018-03-10 PROCEDURE — 85520 HEPARIN ASSAY: CPT | Performed by: THORACIC SURGERY (CARDIOTHORACIC VASCULAR SURGERY)

## 2018-03-10 PROCEDURE — 27210429 ZZH NUTRITION PRODUCT INTERMEDIATE LITER

## 2018-03-10 PROCEDURE — 97530 THERAPEUTIC ACTIVITIES: CPT | Mod: GP | Performed by: REHABILITATION PRACTITIONER

## 2018-03-10 PROCEDURE — 25000132 ZZH RX MED GY IP 250 OP 250 PS 637: Performed by: ANESTHESIOLOGY

## 2018-03-10 PROCEDURE — 97110 THERAPEUTIC EXERCISES: CPT | Mod: GP | Performed by: REHABILITATION PRACTITIONER

## 2018-03-10 PROCEDURE — 83735 ASSAY OF MAGNESIUM: CPT | Performed by: THORACIC SURGERY (CARDIOTHORACIC VASCULAR SURGERY)

## 2018-03-10 PROCEDURE — 25000125 ZZHC RX 250: Performed by: STUDENT IN AN ORGANIZED HEALTH CARE EDUCATION/TRAINING PROGRAM

## 2018-03-10 PROCEDURE — 97535 SELF CARE MNGMENT TRAINING: CPT | Mod: GO

## 2018-03-10 PROCEDURE — 25000132 ZZH RX MED GY IP 250 OP 250 PS 637: Performed by: SURGERY

## 2018-03-10 PROCEDURE — 25000128 H RX IP 250 OP 636: Performed by: SURGERY

## 2018-03-10 PROCEDURE — 99291 CRITICAL CARE FIRST HOUR: CPT | Mod: GC | Performed by: ANESTHESIOLOGY

## 2018-03-10 PROCEDURE — 92526 ORAL FUNCTION THERAPY: CPT | Mod: GN | Performed by: SPEECH-LANGUAGE PATHOLOGIST

## 2018-03-10 PROCEDURE — 80069 RENAL FUNCTION PANEL: CPT | Performed by: THORACIC SURGERY (CARDIOTHORACIC VASCULAR SURGERY)

## 2018-03-10 PROCEDURE — 85300 ANTITHROMBIN III ACTIVITY: CPT | Performed by: THORACIC SURGERY (CARDIOTHORACIC VASCULAR SURGERY)

## 2018-03-10 PROCEDURE — 85379 FIBRIN DEGRADATION QUANT: CPT | Performed by: THORACIC SURGERY (CARDIOTHORACIC VASCULAR SURGERY)

## 2018-03-10 PROCEDURE — 25000132 ZZH RX MED GY IP 250 OP 250 PS 637: Performed by: STUDENT IN AN ORGANIZED HEALTH CARE EDUCATION/TRAINING PROGRAM

## 2018-03-10 PROCEDURE — 00000146 ZZHCL STATISTIC GLUCOSE BY METER IP

## 2018-03-10 PROCEDURE — 25000131 ZZH RX MED GY IP 250 OP 636 PS 637: Performed by: THORACIC SURGERY (CARDIOTHORACIC VASCULAR SURGERY)

## 2018-03-10 PROCEDURE — 25000128 H RX IP 250 OP 636: Performed by: STUDENT IN AN ORGANIZED HEALTH CARE EDUCATION/TRAINING PROGRAM

## 2018-03-10 PROCEDURE — 12000006 ZZH R&B IMCU INTERMEDIATE UMMC

## 2018-03-10 PROCEDURE — 40000225 ZZH STATISTIC SLP WARD VISIT: Performed by: SPEECH-LANGUAGE PATHOLOGIST

## 2018-03-10 PROCEDURE — 80202 ASSAY OF VANCOMYCIN: CPT | Performed by: SURGERY

## 2018-03-10 PROCEDURE — 84460 ALANINE AMINO (ALT) (SGPT): CPT | Performed by: THORACIC SURGERY (CARDIOTHORACIC VASCULAR SURGERY)

## 2018-03-10 PROCEDURE — 25000125 ZZHC RX 250: Performed by: THORACIC SURGERY (CARDIOTHORACIC VASCULAR SURGERY)

## 2018-03-10 PROCEDURE — 85025 COMPLETE CBC W/AUTO DIFF WBC: CPT | Performed by: THORACIC SURGERY (CARDIOTHORACIC VASCULAR SURGERY)

## 2018-03-10 PROCEDURE — P9047 ALBUMIN (HUMAN), 25%, 50ML: HCPCS | Performed by: STUDENT IN AN ORGANIZED HEALTH CARE EDUCATION/TRAINING PROGRAM

## 2018-03-10 PROCEDURE — 25000132 ZZH RX MED GY IP 250 OP 250 PS 637: Performed by: THORACIC SURGERY (CARDIOTHORACIC VASCULAR SURGERY)

## 2018-03-10 PROCEDURE — 85018 HEMOGLOBIN: CPT | Performed by: STUDENT IN AN ORGANIZED HEALTH CARE EDUCATION/TRAINING PROGRAM

## 2018-03-10 RX ORDER — VANCOMYCIN HYDROCHLORIDE 1 G/200ML
1000 INJECTION, SOLUTION INTRAVENOUS
Status: DISCONTINUED | OUTPATIENT
Start: 2018-03-11 | End: 2018-03-10

## 2018-03-10 RX ORDER — METOLAZONE 2.5 MG/1
2.5 TABLET ORAL ONCE
Status: COMPLETED | OUTPATIENT
Start: 2018-03-10 | End: 2018-03-10

## 2018-03-10 RX ADMIN — QUETIAPINE 50 MG: 50 TABLET ORAL at 21:31

## 2018-03-10 RX ADMIN — SULFAMETHOXAZOLE AND TRIMETHOPRIM 1 TABLET: 400; 80 TABLET ORAL at 07:32

## 2018-03-10 RX ADMIN — MYCOPHENOLATE MOFETIL 1500 MG: 200 POWDER, FOR SUSPENSION ORAL at 20:23

## 2018-03-10 RX ADMIN — PREDNISOLONE SODIUM PHOSPHATE 12.9 MG: 15 SOLUTION ORAL at 07:32

## 2018-03-10 RX ADMIN — METOPROLOL TARTRATE 25 MG: 25 TABLET, FILM COATED ORAL at 00:15

## 2018-03-10 RX ADMIN — VALGANCICLOVIR HYDROCHLORIDE 450 MG: 50 POWDER, FOR SOLUTION ORAL at 07:34

## 2018-03-10 RX ADMIN — POTASSIUM CHLORIDE 20 MEQ: 1.5 POWDER, FOR SOLUTION ORAL at 06:17

## 2018-03-10 RX ADMIN — DEXTROSE MONOHYDRATE 4.5 G: 5 INJECTION, SOLUTION INTRAVENOUS at 16:01

## 2018-03-10 RX ADMIN — LEVALBUTEROL TARTRATE 2 PUFF: 45 AEROSOL, METERED ORAL at 15:56

## 2018-03-10 RX ADMIN — PANTOPRAZOLE SODIUM 40 MG: 40 TABLET, DELAYED RELEASE ORAL at 07:32

## 2018-03-10 RX ADMIN — SILDENAFIL CITRATE 10 MG: 10 POWDER, FOR SUSPENSION ORAL at 20:22

## 2018-03-10 RX ADMIN — HUMAN INSULIN 2 UNITS/HR: 100 INJECTION, SOLUTION SUBCUTANEOUS at 14:08

## 2018-03-10 RX ADMIN — ALBUMIN HUMAN 25 G: 0.25 SOLUTION INTRAVENOUS at 02:08

## 2018-03-10 RX ADMIN — SILDENAFIL CITRATE 10 MG: 10 POWDER, FOR SUSPENSION ORAL at 00:29

## 2018-03-10 RX ADMIN — Medication: at 07:34

## 2018-03-10 RX ADMIN — LEVALBUTEROL TARTRATE 2 PUFF: 45 AEROSOL, METERED ORAL at 03:57

## 2018-03-10 RX ADMIN — BUMETANIDE 1 MG: 0.25 INJECTION INTRAMUSCULAR; INTRAVENOUS at 02:08

## 2018-03-10 RX ADMIN — MYCOPHENOLATE MOFETIL 1500 MG: 200 POWDER, FOR SUSPENSION ORAL at 07:33

## 2018-03-10 RX ADMIN — BUMETANIDE 1 MG: 0.25 INJECTION INTRAMUSCULAR; INTRAVENOUS at 09:43

## 2018-03-10 RX ADMIN — DEXTROSE MONOHYDRATE 4.5 G: 5 INJECTION, SOLUTION INTRAVENOUS at 23:36

## 2018-03-10 RX ADMIN — DEXTROSE MONOHYDRATE 4.5 G: 5 INJECTION, SOLUTION INTRAVENOUS at 09:44

## 2018-03-10 RX ADMIN — Medication 2 G: at 06:18

## 2018-03-10 RX ADMIN — NYSTATIN 1000000 UNITS: 500000 SUSPENSION ORAL at 20:23

## 2018-03-10 RX ADMIN — NYSTATIN 1000000 UNITS: 500000 SUSPENSION ORAL at 07:33

## 2018-03-10 RX ADMIN — LEVALBUTEROL TARTRATE 2 PUFF: 45 AEROSOL, METERED ORAL at 21:31

## 2018-03-10 RX ADMIN — BUMETANIDE 1 MG: 0.25 INJECTION INTRAMUSCULAR; INTRAVENOUS at 21:31

## 2018-03-10 RX ADMIN — MULTIPLE VITAMINS W/ MINERALS TAB 1 TABLET: TAB at 07:32

## 2018-03-10 RX ADMIN — PREDNISOLONE SODIUM PHOSPHATE 12.9 MG: 15 SOLUTION ORAL at 20:23

## 2018-03-10 RX ADMIN — METOPROLOL TARTRATE 25 MG: 25 TABLET, FILM COATED ORAL at 11:41

## 2018-03-10 RX ADMIN — DEXTROSE MONOHYDRATE 4.5 G: 5 INJECTION, SOLUTION INTRAVENOUS at 03:22

## 2018-03-10 RX ADMIN — SILDENAFIL CITRATE 10 MG: 10 POWDER, FOR SUSPENSION ORAL at 09:44

## 2018-03-10 RX ADMIN — LEVALBUTEROL TARTRATE 2 PUFF: 45 AEROSOL, METERED ORAL at 13:14

## 2018-03-10 RX ADMIN — NYSTATIN 1000000 UNITS: 500000 SUSPENSION ORAL at 11:41

## 2018-03-10 RX ADMIN — NYSTATIN 1000000 UNITS: 500000 SUSPENSION ORAL at 15:56

## 2018-03-10 RX ADMIN — METOLAZONE 2.5 MG: 2.5 TABLET ORAL at 08:00

## 2018-03-10 RX ADMIN — LEVALBUTEROL TARTRATE 2 PUFF: 45 AEROSOL, METERED ORAL at 00:16

## 2018-03-10 ASSESSMENT — ACTIVITIES OF DAILY LIVING (ADL): ADLS_ACUITY_SCORE: 11

## 2018-03-10 NOTE — PROGRESS NOTES
Pulmonary Medicine  Cystic Fibrosis - Lung Transplant Daily Progress Note   March 10, 2018       Patient: Kecia Blue  MRN: [id number]  Transplant Date: 2/21/2018 (POD# 9)  Admission date: 2/21/2018  Hospital Day #17          Assessment and Plan:   Kecia Blue is a 58 y/o female w/PMHx significant for dermatomyositis (on immunosuppression), seronegative RA, ILD, and pulmonary hypertension who was admitted for emergent lung transplant w/u on 2/21 for increased SOB and hypoxia. Required V-A ECMO for right heart failure on 2/27. Now s/p bilateral sequential lung transplant on 3/1, ECMO decannulation on 3/3. Post-op course c/b hemodynamic instability requiring pressors and iFlolan. Was extubated O/N of 3/7, flolan d/c'd, off pressors, who is now sitting up in chair.     Plan Today:  --tacro level not drawn today will continue at present gtt rate today. If level stable tomorrow between 10-15, consider PO dosing at 3 and 3.5 mg  --Continue metoprolol 25mg BID  --Continue 100mL of 25% Albumin Q8H  --Goal fluid balance is -1.5L by tomorrow  -- heparin gtt to 550U/hr (no titration)  --CT management for left hemothorax per CVTS  --Continue Vancomycin and Zosyn per Pulmonary  --Follow-up OSH cultures  --bumex 1mg Q8H, metolazone added today  --Plan for Transfer to the Floor today     ##S/p bilateral lung transplant on 3/1/18 for acute hypoxic/hypercapneic respiratory failure 2/2 ILD  ##Extubated 3/6/18  ##Pulm HTN 2/2 ILD  Adequate graft function. Weaned off iFlolan. On room air and oxygenating well.   - Antibiotics as noted below  - Agree with bumex, metolozone dosing and albumin   - Continue aggressive pulmonary toilet with chest physiotherapy QID (acapella/incentive spirometry once extubated)  - Chest tube management per CVTS  - Explant pathology shows NSIP (see path report below in results)    - Agree with continuing sildenafil    ##Small-mod L pleural effusion, likely hemothorax  Pt required 1 unit pRBCs  O/N. Will CTM with imaging. If worsening, could require VATS.                                Continue 3-drug immunosuppression:  Goal tacrolimus target level 10-12  - IV tacrolimus @ 85 mcg/hr today and today's tacro level not drawn. If level stable tomorrow between 10-15, consider PO dosing at 3 and 3.5 mg  - MMF 1500 g BID  - Prednisolone 12.9mg BID     Prophylaxis:   PJP: Bactrim  CMV: will begin vangancyclovir 900mg daily POD #8 (ordered)  Fungal: Nystatin       Donor Recipient Intervention   CMV status  Pos   Pos  Valgancyclovir POD#8   EBV status Pos Pos Immune    HSV status N/A  Pos Immune      ##Infectious disease  ##SIRS  No known infectious disease history. SIRS likely a response from multiple procedures (VA ECMO cannulation and decannulation) and transplant surgery. Fever curve improving. Negative recipient bronch culture from time of transplant. Pan cultured 3/4 NGTD.   - Donor culture at [facility] growing MRSA -- continue vancomycin   - Donor culture at OSH preliminarily growing MSSA, Strep pneumoniae, Moraxella catarrhalis, Haemophilus species. Final sensitivities on donor cultures pending. Continue pip/tazo at this time.     ##Nutrtition  -restart TF; ADAT  -Speech to assess diet    ##Diarrhea  ##Large bowel loops on imaging  ##Pain on defecation--could be anal fissure vs hemorrhoid  Could be 2/2 zosyn or MMF. C diff neg.    --NG tube d/c.   --ADAT     --desitin cream daily BID when removing anal pouch    ##LUE DVT, provoked  Found on U/S on 3/7, started on heparin drip on 3/7, cont hep.     ##RADHA:suspect secondary to meds.  --follow urine output  --tacro goal 10-12     We appreciate the excellent care provided by the ICU team. We will continue to follow along closely, please do not hesitate to call with any questions or concerns.     Angelica Figueroa MD MPH   of Medicine          Subjective & Interval History:     Last 24 hour vital signs, labs and care team notes reviewed.    On  room air sitting in chair.  Working with PT.  Pain management is adequate.  Coughing up some sputum.            Review of Systems:     C: negative for fever, chills  INTEGUMENTARY/SKIN: neg for rash, no other new lesions  ENT/MOUTH: no sore throat, no sinus pain, no nasal drainage  RESP: see interval history  CV: some chest pain, no palpitations, + peripheral edema, no orthopnea  GI: no nausea, no vomiting, no reflux symptoms, no change in stools  : rivas  MUSCULOSKELETAL: no myalgias, no arthralgias  ENDOCRINE: blood sugars with adequate control  NEURO: no headache  PSYCHIATRIC: mood stable          Medications:     Active Medications:    bumetanide  1 mg Intravenous Q8H     valGANciclovir  450 mg Oral or Feeding Tube Daily     sulfamethoxazole-trimethoprim  1 tablet Oral or NG Tube Daily     multivitamin, therapeutic with minerals  1 tablet Oral or Feeding Tube Daily     zinc oxide   Topical BID     metoprolol tartrate  25 mg Oral BID     piperacillin-tazobactam  4.5 g Intravenous Q6H     pantoprazole  40 mg Oral or Feeding Tube Daily     QUEtiapine  50 mg Oral At Bedtime     vancomycin (VANCOCIN) IV  1,000 mg Intravenous Q24H     levalbuterol  2 puff Inhalation Q4H JULES     sildenafil  10 mg Oral or Feeding Tube Q8H     nystatin  1,000,000 Units Swish & Swallow 4x Daily     mycophenolate  1,500 mg Oral BID IS    Or     mycophenolate  1,500 mg Oral or NG Tube BID IS     prednisoLONE  0.25 mg/kg (Dosing Weight) Oral or NG Tube BID     Active PRN Medications:  sennosides, potassium phosphate (KPHOS) in D5W IV, potassium phosphate (KPHOS) in D5W IV, potassium phosphate (KPHOS) in D5W IV, potassium phosphate (KPHOS) in D5W IV, potassium phosphate (KPHOS) in D5W IV, heparin, artificial tears, metoprolol, IV fluid REPLACEMENT ONLY, naloxone, acetaminophen, oxyCODONE IR, ondansetron **OR** ondansetron, IV fluid REPLACEMENT ONLY, glucose **OR** dextrose **OR** glucagon, potassium chloride, potassium chloride, potassium  chloride, potassium chloride with lidocaine, potassium chloride, magnesium sulfate, magnesium sulfate         Physical Exam:     Constitutional: On room air,up in chair on exam, able to answer questions with sense of humor. In no apparent distress.   HEENT: Eyes with pink conjunctivae, no scleral jaundice. Neck supple without lymphadenopathy.  PULM: Good air flow bilaterally. Diffuse rhonchi.    CV: Normal S1 and S2. RRR. No murmur, gallop, or rub. Trace LE edema. Peripheral pulses intact.    ABD: Hypoactive BS, soft, nontender, nondistended.   MSK: Moves all extremities. No apparent muscle wasting.    NEURO:  Following simple commands. Answers questions.   SKIN: No rash on limited exam. ?     Lines, Drains, and Devices:  Peripheral IV 02/21/18 Left;Lateral Upper forearm (Active)   Site Assessment WDL 3/6/2018 12:00 PM   Line Status Infusing 3/6/2018 12:00 PM   Phlebitis Scale 0-->no symptoms 3/6/2018 12:00 PM   Infiltration Scale 0 3/6/2018 12:00 PM   Infiltration Site Treatment Method  None 3/5/2018  4:00 AM   Extravasation? No 3/6/2018 12:00 PM   Dressing Intervention New dressing  3/3/2018  4:00 AM   IV Site Rotation Due Date 02/25/18 3/2/2018  4:00 AM   Reason Not Rotated Poor venous access 3/2/2018  4:00 AM   Number of days:13       Arterial Line 02/27/18 Femoral (Active)   Site Assessment WDL 3/6/2018 12:00 PM   Line Status Pulsatile blood flow 3/6/2018 12:00 PM   Art Line Waveform Appropriate 3/6/2018 12:00 PM   Art Line Interventions Leveled 3/6/2018 12:00 PM   Color/Movement/Sensation Capillary refill less than 3 sec 3/6/2018 12:00 PM   Dressing Type Transparent 3/6/2018 12:00 PM   Dressing Status Clean, dry, intact 3/6/2018 12:00 PM   Dressing Intervention Dressing changed/new dressing 3/6/2018  4:00 AM   Dressing Change Due 03/11/18 3/6/2018 12:00 PM   Number of days:7       CVC Double Lumen 03/01/18 (Active)   Site Assessment WDL 3/6/2018 12:00 PM   Extravasation? No 3/6/2018 12:00 PM   Dressing  "Intervention Transparent;Chlorhexidine sponge 3/6/2018 12:00 PM   Dressing Change Due 03/11/18 3/6/2018 12:00 PM   CVC Comment Cordis 3/5/2018  8:00 AM   CVC Lumen Assessment White;Brown;Blue 3/2/2018  4:00 AM   Number of days:5       Pulmonary Artery Catheter - Single Lumen 03/03/18 1000 Left internal jugular vein continuous hemodynamic catheter (Active)   Dressing dressing dry and intact 3/6/2018  8:00 AM   Securement secured with sutures 3/6/2018  8:00 AM   PA Catheter Markings (cm) 51 3/6/2018  8:00 AM   Phlebitis 0-->no symptoms 3/6/2018  8:00 AM   Infiltration 0-->no symptoms 3/6/2018  8:00 AM   Waveform normal 3/6/2018  8:00 AM   Balloon Inflation Volume to Obtain Wedge Tracing (mL) 0 3/4/2018  4:00 AM   Pressure Catheter Interventions blood specimen obtained and sent to lab 3/6/2018  8:00 AM   Daily Review Of Necessity completed 3/6/2018  8:00 AM   Number of days:3            Data:     All vital signs, laboratory and imaging data for the past 24 hours reviewed.      Vital signs:  Temp: 97.7  F (36.5  C) Temp src: Oral BP: 111/81   Heart Rate: 102 Resp: 20 SpO2: 91 % O2 Device: None (Room air) Oxygen Delivery: 2 LPM Height: 160 cm (5' 3\") Weight: 59.5 kg (131 lb 2.8 oz)    Weight trend:   Vitals:    03/08/18 0400 03/09/18 0415 03/10/18 0200   Weight: 60.7 kg (133 lb 13.1 oz) 60.7 kg (133 lb 13.1 oz) 59.5 kg (131 lb 2.8 oz)        Intake/Output Summary (Last 24 hours) at 03/10/18 1743  Last data filed at 03/10/18 1600   Gross per 24 hour   Intake          2660.35 ml   Output             2515 ml   Net           145.35 ml       Labs    CMP:     Recent Labs  Lab 03/10/18  0330 03/09/18  0430 03/08/18  0403 03/07/18  1757 03/07/18  1150 03/07/18  0354    138 142  --   --  146*   POTASSIUM 3.6 3.5 3.9 4.1 4.0 3.8   CHLORIDE 104 102 107  --   --  112*   CO2 26 27 24  --   --  25   ANIONGAP 12 10 11  --   --  9   * 127* 147*  --   --  169*   BUN 47* 42* 48*  --   --  57*   CR 1.51* 1.41* 1.33*  --   --  " 1.40*   GFRESTIMATED 36* 39* 41*  --   --  39*   GFRESTBLACK 43* 47* 50*  --   --  47*   CHELSEY 8.1* 7.8* 6.8*  --   --  7.7*   MAG 1.7 2.0 2.2 2.5* 1.9 2.5*   PHOS 3.2 3.5 2.8  --  3.0 2.4*   ALBUMIN 3.3* 3.1* 1.8*  --   --  2.0*   ALT 22 21 28  --   --  39     CBC:     Recent Labs  Lab 03/10/18  1318 03/10/18  0330 03/09/18  0430 03/08/18  1635 03/08/18  0403 03/07/18  1150   WBC  --  16.4* 15.0*  --  17.8* 16.7*   RBC  --  2.55* 2.51*  --  2.32* 2.51*   HGB 8.6* 7.6* 7.6* 8.8* 7.1* 7.8*   HCT 26.6* 24.1* 23.4*  --  22.0* 24.3*   MCV  --  95 93  --  95 97   MCH  --  29.8 30.3  --  30.6 31.1   MCHC  --  31.5 32.5  --  32.3 32.1   RDW  --  17.3* 16.9*  --  16.8* 16.9*   PLT  --  184 164  --  178 157     INR:     Recent Labs  Lab 03/05/18  1648 03/05/18  1132 03/05/18  0842 03/05/18  0355   INR 1.11 1.13 1.13 1.15*     Glucose:   Recent Labs  Lab 03/10/18  1711 03/10/18  1304 03/10/18  1138 03/10/18  0938 03/10/18  0753 03/10/18  0629 03/10/18  0330  03/09/18  0430  03/08/18  0403  03/07/18  0354  03/07/18  0010  03/06/18  0402   GLC  --   --   --   --   --   --  132*  --  127*  --  147*  --  169*  --  208*  --  138*   * 141* 161* 122* 113* 125*  --   < >  --   < >  --   < >  --   < >  --   < >  --    < > = values in this interval not displayed.  Blood Gas:   Recent Labs  Lab 03/07/18  0355 03/07/18  0354 03/07/18  0010  03/06/18 2039   PHV 7.43  --  7.42  --  7.37   PCO2V 41  --  42  --  45   PO2V 31  --  32  --  33   HCO3V 28  --  27  --  26   LASHAUN 3.1  --  2.4  --  0.7   O2PER 2L 2L 2L  < > 3L   < > = values in this interval not displayed.  Culture Data     Recent Labs  Lab 03/06/18  1330 03/04/18  2057 03/04/18  1417 03/04/18  1411   CULT Culture negative after 3 days  No growth No growth Culture negative monitoring continues Culture negative monitoring continues     Virology Data:   Lab Results   Component Value Date    FLUAH1 Negative 02/27/2018    FLUAH3 Negative 02/27/2018    NP9944 Negative 02/27/2018     IFLUB Negative 02/27/2018    RSVA Negative 02/27/2018    RSVB Negative 02/27/2018    PIV1 Negative 02/27/2018    PIV2 Negative 02/27/2018    PIV3 Negative 02/27/2018    HMPV Negative 02/27/2018    HRVS Negative 02/27/2018    ADVBE Negative 02/27/2018    ADVC Negative 02/27/2018     Historical CMV results (last 3 of prior testing):  Lab Results   Component Value Date    CMVQNT Canceled, Test credited (A) 03/01/2018    CMVQNT Canceled, Test credited (A) 03/01/2018     Lab Results   Component Value Date    CMVLOG Canceled, Test credited 03/01/2018    CMVLOG Canceled, Test credited 03/01/2018     Urine Studies    Recent Labs   Lab Test  03/04/18   1425   URINEPH  5.5   NITRITE  Negative   LEUKEST  Negative   WBCU  5     Explant Pathology  FINAL DIAGNOSIS:   A. LUNG, LEFT NATIVE, PNEUMONECTOMY:   - Nonspecific interstitial pneumonitis (NSIP) with large areas of   end-stage fibrosis, georges-airway foreign body   giant cell reaction with cholesterol clefts suggestive of aspiration, and   foci or organizing pneumonia.   - Six benign hilar lymph nodes.     B. LUNG, RIGHT NATIVE, PNEUMONECTOMY:   - Nonspecific interstitial pneumonitis (NSIP) with large areas of   end-stage fibrosis, georges-airway foreign body   giant cell reaction with cholesterol clefts suggestive of aspiration, and   foci or organizing pneumonia.   - Seven benign hilar lymph nodes.

## 2018-03-10 NOTE — PLAN OF CARE
Problem: Patient Care Overview  Goal: Plan of Care/Patient Progress Review  Discharge Planner SLP   Patient plan for discharge: Pt did not state  Current status: Pt demonstrates continued improving swallowing function. Recommend advancing pt to Dysphagia diet level 3 with thin liquids. Sit pt upright for po intake. Encourage small bites/sips and slow rate. Alternate bites/sips. Pt to take breathing breaks during po intake and encouraged increased po intake. SLP to follow per POC.   Barriers to return to prior living situation: risks associated with dysphagia  Recommendations for discharge: Defer to PT/OT  Rationale for recommendations: Pt will likely achieve SLP dysphagia goals prior to discharge.        Entered by: Krystle Davis 03/10/2018 2:39 PM

## 2018-03-10 NOTE — PROGRESS NOTES
CVTS PROGRESS NOTE  03/10/2018    ASSESSMENT: Kecia Blue is a 57 year old female with PMH significant for dermatomyositis on immunosuppression, eronegative RA, ILD, and pulmonary hypertension, who was admitted for emergent lung transplant work-up on 2/21 for increasing shortness of breath and hypoxia now s/p emergent VA ECMO placement on 2/27/18. Now s/p bilateral lung transplant on 3/1 and VA ECMO decannulation on 3/3 with Dr. Hernandez.      TODAY'S PROGRESS/PLAN  - Continue metoprolol 25mg BID  - Bumex 1mg TID   - Metolazone x1 dose  - Fluid goal -1.0->-1.5L  - Remove CVC  - Oral Tacro per Pulm on 3/10  - ADAT  - Chest tubes per CVTS team  - TTF today (6C)      PLAN:  Neuro/ pain/ sedation:  # Postoperative acute pain  # Sedation for mechanical ventilation - extubated  - Monitor neurological status. Notify the MD for any acute changes in exam.  - PRN tylenol/oxycodone.     Pulmonary care:  # Acute on chronic respiratory failure, worsening, s/p bilateral lung transplantation 3/1/2018  # ILD related to dermatomyositis   # Severe pulmonary hypertension (WHO class III)  # Pneumomediastinum, 2/22/2018  # VA ECMO decannulated 3/3   - Supplemental oxygen to keep saturation above 92%.  - S/p Alan and Flolan for RV support    Cardiovascular:  # Right heart failure  # Right ventricular hypertrophy  # Pulmonary HTN  # Postoperative atrial fibrillation with RVR - sinus between  - Monitor hemodynamic status.   - Metoprolol 25mg BID  - More noted hemodynamic instability with A fib    GI care:  #Constipation - having BMs  #Diarrhea - resolving  - NPO, ADAT as able per speech eval.  - NG out with decompression of abdominal air  - NJ placed  - Bowel regimen    Fluids/ Electrolytes/ Nutrition:  #Hypernatremia-resolved   - Advance TFs to goal  - ICU electrolyte protocol  - D10W at 50cc/hr if not on feeds  - FWF at 30cc/4 hours    Renal/ Fluid Balance:  #Acute kidney injury-resolving  #Volume overload  - Urine output is  adequate so far with change to Bumex.  - Cr improving  - Diurese with lasix for net negative goal 1000mL negative  - Will continue to monitor intake and output.    Endocrine:  #Stress hyperglycemia-resolved    - Insulin gtt     ID/ Antibiotics:  #Febrile  - Anti-rejection and immunosuppressant medications per transplant team  - Donor lungs with MRSA, coverage for 2 weeks with vanco/zosyn until donor cultures return.   - Follow cultures    Heme:  #Acute blood loss anemia  #Acute LUE DVT on ultrasound   - Hemoglobin stable, transfused 1u PRBC 3/8  - Monitor Hgb and Plts, transfuse if Hgb < 7.   - Low-intensity dose heparin for DVT per surgery teams    Prophylaxis:    - Mechanical prophylaxis for DVT.   - Heparin SubQ TID.  - PPI    Lines/ tubes/ drains:  - NJ, CVC, PIV, Basilio, Chest tubes    Disposition:  - TTF today     Seen with Dr. Hernandez.     Kwesi Schwartz MD  Anesthesiology Resident CA2, PGY3      ====================================    TODAY'S PROGRESS:   SUBJECTIVE:   - Did well overnight, noting able to sleep and on RA through the night  - Decreasing bowel movements  - Tolerating minimal oral diet due to appetite     OBJECTIVE:   1. VITAL SIGNS:   Temp:  [97.6  F (36.4  C)-98.2  F (36.8  C)] 97.7  F (36.5  C)  Heart Rate:  [] 98  Resp:  [28-30] 28  BP: ()/(56-83) 103/80  SpO2:  [93 %-98 %] 93 %  Resp: 28    2. INTAKE/ OUTPUT:   I/O last 3 completed shifts:  In: 3120.01 [I.V.:1495.01; NG/GT:425]  Out: 2160 [Urine:1380; Stool:250; Chest Tube:530]    3. PHYSICAL EXAMINATION:   General: Alert, NAD  Neuro: Alert, NAD, moves extremities, follows commands  Resp: Equal breath sounds, somewhat diminished at bases.  CV: sinus tachy. CTs with s/s drainage.  Abdomen: Soft, mildly distended  Incisions: c/d/i  Extremities: warm, toes dusky bilaterally, cap refill < 2 seconds, +doppler signals bilateral PT.     4. INVESTIGATIONS:   Arterial Blood Gases     Recent Labs  Lab 03/07/18  0354 03/06/18  2200  03/06/18  1905 03/06/18  1822   PH 7.47* 7.45 7.43 7.43   PCO2 35 36 36 36   PO2 113* 159* 156* 142*   HCO3 26 25 24 24     Complete Blood Count     Recent Labs  Lab 03/10/18  0330 03/09/18  0430 03/08/18  1635 03/08/18  0403 03/07/18  1150   WBC 16.4* 15.0*  --  17.8* 16.7*   HGB 7.6* 7.6* 8.8* 7.1* 7.8*    164  --  178 157     Basic Metabolic Panel    Recent Labs  Lab 03/10/18  0330 03/09/18  0430 03/08/18  0403 03/07/18  1757  03/07/18  0354    138 142  --   --  146*   POTASSIUM 3.6 3.5 3.9 4.1  < > 3.8   CHLORIDE 104 102 107  --   --  112*   CO2 26 27 24  --   --  25   BUN 47* 42* 48*  --   --  57*   CR 1.51* 1.41* 1.33*  --   --  1.40*   * 127* 147*  --   --  169*   < > = values in this interval not displayed.  Liver Function Tests    Recent Labs  Lab 03/10/18  0330 03/09/18  0430 03/08/18  0403 03/07/18  0354  03/05/18  1648 03/05/18  1132 03/05/18  0842 03/05/18  0355   ALT 22 21 28 39  < >  --   --  40  --    ALBUMIN 3.3* 3.1* 1.8* 2.0*  < >  --   --   --  2.1*   INR  --   --   --   --   --  1.11 1.13 1.13 1.15*   < > = values in this interval not displayed.  Pancreatic Enzymes  No lab results found in last 7 days.  Coagulation Profile    Recent Labs  Lab 03/05/18 1648 03/05/18  1132 03/05/18  0842 03/05/18  0355   INR 1.11 1.13 1.13 1.15*   PTT 24 27 25 26     Lactate  Invalid input(s): LACTATE    5. RADIOLOGY:   No results found for this or any previous visit (from the past 24 hour(s)).    =========================================

## 2018-03-10 NOTE — PLAN OF CARE
Problem: Patient Care Overview  Goal: Plan of Care/Patient Progress Review  Outcome: Improving  Remains hemodynamically stable. Denies pain. Repositioned for pt comfort. No acute changes over NOC. Continuing to monitor.

## 2018-03-10 NOTE — PLAN OF CARE
Problem: Patient Care Overview  Goal: Plan of Care/Patient Progress Review  Discharge Planner PT   Patient plan for discharge: not discussed with pt during this session  Current status: Instructed pt in UE strengthening exercises to facilitate improved posture and breathing technique. Pt demonstrates proper use of aerobika. Pt transfers sit<>stand x2 reps from recliner with min-modAx2, remains standing for 10-15 seconds, uses lung pillow for UEs. Pt activity tolerance decreased, evident by decreased time spent in standing.   Barriers to return to prior living situation: respiratory status, impaired functional mobility, activity tolerance, LE strength.  Recommendations for discharge: TCU  Rationale for recommendations: pt would benefit from continued skilled rehab in order to improve LE strength, functional mobility, and activity tolerance and return to PLOF       Entered by: Vibha Bang 03/10/2018 9:12 AM

## 2018-03-10 NOTE — PROGRESS NOTES
I personally saw, examined and evaluated the patient. I agree with the above documentation, assessment and plan, as outlined in the Resident/Fellow/NP/PA's note.    Assessment: 57 year old female with PMH significant for dermatomyositis on immunosuppression, RA, ILD, and pulmonary hypertension, who was admitted for emergent lung transplant work-up on 2/21 for increasing shortness of breath and hypoxia. S/P emergent [facility] ECMO placement on 2/27/18. S/P bilateral lung transplant on 3/1.    Critical Care Diagnoses:        1. Vasoplegic Shock - improving                                                  2. Cardiogenic Shock - improving                                                  3. Atrial Fibrillation with Rapid Ventricular Response - resolved                                                  4. Pulmonary HTN - stable                                                  5. Acute Right Ventricular Heart Failure - resolved                                                  6. Volume Overload with Intravascular Hypovolemia                                                  7. Diarrhea - resolved                                                  8. Abdominal Pain with Gastric Distention - resolved                                                                   9. Concern for Protein-Calorie Malnutrition                                                 10. Acute Renal Failure with Hyperchloremia - improving                                                 11. Left Hemothorax - stable     Plan:     1. Continue metoprolol 25mg BID  2. Continue 100mL of 25% Albumin Q8H  3. Goal fluid balance is -1L by tomorrow  4. Continue heparin gtt at 550U/hr (no titration)  5. Advance diet as tolerated  6. OOB with mobilization  7. CT management for left hemothorax per CVTS  8. Continue Vancomycin and Zosyn per Pulmonary  9. Follow-up OSH cultures  10. Continue bumex and metolazone  14. Transfer to the Floor    Total Critical Care Time: 36  mick Grant M.D.

## 2018-03-10 NOTE — PROGRESS NOTES
CVICU PROGRESS NOTE  03/10/2018    ASSESSMENT: Kecia Blue is a 57 year old female with PMH significant for dermatomyositis on immunosuppression, eronegative RA, ILD, and pulmonary hypertension, who was admitted for emergent lung transplant work-up on 2/21 for increasing shortness of breath and hypoxia now s/p emergent [facility] ECMO placement on 2/27/18. Now s/p bilateral lung transplant on 3/1 and [facility] ECMO decannulation on 3/3 with Dr. Hernandez.      TODAY'S PROGRESS/PLAN  - Continue metoprolol 25mg BID  - Bumex 1mg TID   - Metolazone x1 dose  - Fluid goal -1.0->-1.5L  - Remove CVC  - Oral Tacro per Pulm on 3/10  - ADAT  - Chest tubes per CVTS team  - TTF today (6C)      PLAN:  Neuro/ pain/ sedation:  # Postoperative acute pain  # Sedation for mechanical ventilation - extubated  - Monitor neurological status. Notify the MD for any acute changes in exam.  - PRN tylenol/oxycodone.     Pulmonary care:  # Acute on chronic respiratory failure, worsening, s/p bilateral lung transplantation 3/1/2018  # ILD related to dermatomyositis   # Severe pulmonary hypertension (WHO class III)  # Pneumomediastinum, 2/22/2018  # [facility] ECMO decannulated 3/3   - Supplemental oxygen to keep saturation above 92%.  - S/p Alan and Flolan for RV support    Cardiovascular:  # Right heart failure  # Right ventricular hypertrophy  # Pulmonary HTN  # Postoperative atrial fibrillation with RVR - sinus between  - Monitor hemodynamic status.   - Metoprolol 25mg BID  - More noted hemodynamic instability with A fib    GI care:  #Constipation - having BMs  #Diarrhea - resolving  - NPO, ADAT as able per speech eval.  - NG out with decompression of abdominal air  - NJ placed  - Bowel regimen    Fluids/ Electrolytes/ Nutrition:  #Hypernatremia-resolved   - Advance TFs to goal  - ICU electrolyte protocol  - D10W at 50cc/hr if not on feeds  - FWF at 30cc/4 hours    Renal/ Fluid Balance:  #Acute kidney injury-resolving  #Volume overload  - Urine output is  adequate so far with change to Bumex.  - Cr improving  - Diurese with lasix for net negative goal 1000mL negative  - Will continue to monitor intake and output.    Endocrine:  #Stress hyperglycemia-resolved    - Insulin gtt     ID/ Antibiotics:  #Febrile  - Anti-rejection and immunosuppressant medications per transplant team  - Donor lungs with MRSA, coverage for 2 weeks with vanco/zosyn until donor cultures return.   - Follow cultures    Heme:  #Acute blood loss anemia  #Acute LUE DVT on ultrasound   - Hemoglobin stable, transfused 1u PRBC 3/8  - Monitor Hgb and Plts, transfuse if Hgb < 7.   - Low-intensity dose heparin for DVT per surgery teams    Prophylaxis:    - Mechanical prophylaxis for DVT.   - Heparin SubQ TID.  - PPI    Lines/ tubes/ drains:  - NJ, CVC, PIV, Basilio, Chest tubes    Disposition:  - TTF today     Seen with Dr. Grant.    Kwesi Schwartz MD  Anesthesiology Resident CA2, PGY3      ====================================    TODAY'S PROGRESS:   SUBJECTIVE:   - Did well overnight, noting able to sleep and on RA through the night  - Decreasing bowel movements  - Tolerating minimal oral diet due to appetite     OBJECTIVE:   1. VITAL SIGNS:   Temp:  [97.6  F (36.4  C)-98.2  F (36.8  C)] 97.7  F (36.5  C)  Heart Rate:  [] 98  Resp:  [28-30] 28  BP: ()/(56-90) 103/80  SpO2:  [92 %-100 %] 93 %  Resp: 28    2. INTAKE/ OUTPUT:   I/O last 3 completed shifts:  In: 3120.01 [I.V.:1495.01; NG/GT:425]  Out: 2160 [Urine:1380; Stool:250; Chest Tube:530]    3. PHYSICAL EXAMINATION:   General: Alert, NAD  Neuro: Alert, NAD, moves extremities, follows commands  Resp: Equal breath sounds, somewhat diminished at bases.  CV: sinus tachy. CTs with s/s drainage.  Abdomen: Soft, mildly distended  Incisions: c/d/i  Extremities: warm, toes dusky bilaterally, cap refill < 2 seconds, +doppler signals bilateral PT.     4. INVESTIGATIONS:   Arterial Blood Gases     Recent Labs  Lab 03/07/18  0354 03/06/18  1962  03/06/18  1905 03/06/18  1822   PH 7.47* 7.45 7.43 7.43   PCO2 35 36 36 36   PO2 113* 159* 156* 142*   HCO3 26 25 24 24     Complete Blood Count     Recent Labs  Lab 03/10/18  0330 03/09/18  0430 03/08/18  1635 03/08/18  0403 03/07/18  1150   WBC 16.4* 15.0*  --  17.8* 16.7*   HGB 7.6* 7.6* 8.8* 7.1* 7.8*    164  --  178 157     Basic Metabolic Panel    Recent Labs  Lab 03/10/18  0330 03/09/18  0430 03/08/18  0403 03/07/18  1757  03/07/18  0354    138 142  --   --  146*   POTASSIUM 3.6 3.5 3.9 4.1  < > 3.8   CHLORIDE 104 102 107  --   --  112*   CO2 26 27 24  --   --  25   BUN 47* 42* 48*  --   --  57*   CR 1.51* 1.41* 1.33*  --   --  1.40*   * 127* 147*  --   --  169*   < > = values in this interval not displayed.  Liver Function Tests    Recent Labs  Lab 03/10/18  0330 03/09/18  0430 03/08/18  0403 03/07/18  0354  03/05/18  1648 03/05/18  1132 03/05/18  0842 03/05/18  0355   ALT 22 21 28 39  < >  --   --  40  --    ALBUMIN 3.3* 3.1* 1.8* 2.0*  < >  --   --   --  2.1*   INR  --   --   --   --   --  1.11 1.13 1.13 1.15*   < > = values in this interval not displayed.  Pancreatic Enzymes  No lab results found in last 7 days.  Coagulation Profile    Recent Labs  Lab 03/05/18 1648 03/05/18  1132 03/05/18  0842 03/05/18  0355   INR 1.11 1.13 1.13 1.15*   PTT 24 27 25 26     Lactate  Invalid input(s): LACTATE    5. RADIOLOGY:   Recent Results (from the past 24 hour(s))   XR Chest Port 1 View    Narrative    EXAM: XR CHEST PORT 1 VW  3/9/2018 8:05 AM      HISTORY: s/p lung transplant;     COMPARISON: CT 3/8/2018    FINDINGS: Portable AP view of the chest obtained. Enteric tube courses  beyond the margins of the film. Left IJ central line tip projects over  the distal nondominant vein. Postsurgical changes of bilateral lung  transplantation, including median sternotomy wires and surgical clips.  Unchanged right basilar chest tube and left apical chest tube.     Trachea is midline. The cardiac silhouette  is unchanged. Small right  pleural effusion and small left hemothorax. The previously seen trace  pneumothoraces on CT 3/8/2018 are not well visualized on this study,  likely due to differences in technique. Unchanged streaky bibasilar  opacities. Unchanged prominent gas-filled loops of small bowel.      Impression    IMPRESSION:   1. Stable support devices.  2. Unchanged small right pleural effusion and small left hemothorax,  with associated atelectasis/consolidation.    I have personally reviewed the examination and initial interpretation  and I agree with the findings.    ADAM GONZALEZ MD       =========================================

## 2018-03-10 NOTE — PLAN OF CARE
Problem: Patient Care Overview  Goal: Plan of Care/Patient Progress Review  OT/4E - Discharge Planner OT   Patient plan for discharge: not discussed this session  Current status: Pt demonstrates decreased UE AROM and proximal weakness during ADL.  Pt demos ability to brush teeth with set up assist only, min A for facial grooming, and mod A to brush hair due to decreased ROM.  Pt on RA, spO2 96%,  /73.   Barriers to return to prior living situation: post surgical precautions, activity tolerance, strength  Recommendations for discharge: ARU  Rationale for recommendations: To increase IND with ADL, increase activity tolerance and strength following B lung transplant. Pt is motivated for therapy and has multidisciplinary therapy goals        Entered by: Kathy Wu 03/10/2018 11:50 AM

## 2018-03-10 NOTE — PLAN OF CARE
Problem: Patient Care Overview  Goal: Plan of Care/Patient Progress Review  Outcome: Improving  D: Patient in good spirits and motivated for rehab, denies all pain, including belly pain  I: patient up to chair x2, pivot x1 and sling x1, IS and acapella incouraged, OG removed and TF restarted through NJ, Pt also ate 25% of magic cup this evening, insulin drip now on algorithm 2, D10 Stopped  A: patient tolerating time in chair but has trouble taking deep breaths and using IS and acapella  P: continue to rehab, rest overnight

## 2018-03-10 NOTE — PLAN OF CARE
Problem: Chronic Respiratory Difficulty Comorbidity  Goal: Chronic Respiratory Difficulty  Patient comorbidity will be monitored for signs and symptoms of Respiratory Difficulty (Chronic) condition.  Problems will be absent, minimized or managed by discharge/transition of care.   Outcome: Improving  Patient is not tolerating and maintaining SPO2 above 90% on room air.  She does have very shallow, fast, respirations, and has trouble taking deep breaths and using the incentive spirometer.

## 2018-03-11 ENCOUNTER — APPOINTMENT (OUTPATIENT)
Dept: GENERAL RADIOLOGY | Facility: CLINIC | Age: 58
DRG: 003 | End: 2018-03-11
Attending: PHYSICIAN ASSISTANT
Payer: COMMERCIAL

## 2018-03-11 ENCOUNTER — APPOINTMENT (OUTPATIENT)
Dept: PHYSICAL THERAPY | Facility: CLINIC | Age: 58
DRG: 003 | End: 2018-03-11
Attending: INTERNAL MEDICINE
Payer: COMMERCIAL

## 2018-03-11 ENCOUNTER — APPOINTMENT (OUTPATIENT)
Dept: SPEECH THERAPY | Facility: CLINIC | Age: 58
DRG: 003 | End: 2018-03-11
Attending: INTERNAL MEDICINE
Payer: COMMERCIAL

## 2018-03-11 PROBLEM — T38.0X5A STEROID-INDUCED HYPERGLYCEMIA: Status: ACTIVE | Noted: 2018-03-11

## 2018-03-11 PROBLEM — R73.9 STEROID-INDUCED HYPERGLYCEMIA: Status: ACTIVE | Noted: 2018-03-11

## 2018-03-11 LAB
ALBUMIN SERPL-MCNC: 2.8 G/DL (ref 3.4–5)
ANION GAP SERPL CALCULATED.3IONS-SCNC: 9 MMOL/L (ref 3–14)
BACTERIA SPEC CULT: NO GROWTH
BACTERIA SPEC CULT: NO GROWTH
BUN SERPL-MCNC: 49 MG/DL (ref 7–30)
CALCIUM SERPL-MCNC: 8.2 MG/DL (ref 8.5–10.1)
CHLORIDE SERPL-SCNC: 102 MMOL/L (ref 94–109)
CO2 SERPL-SCNC: 29 MMOL/L (ref 20–32)
CREAT SERPL-MCNC: 1.58 MG/DL (ref 0.52–1.04)
ERYTHROCYTE [DISTWIDTH] IN BLOOD BY AUTOMATED COUNT: 17.5 % (ref 10–15)
GFR SERPL CREATININE-BSD FRML MDRD: 34 ML/MIN/1.7M2
GLUCOSE BLDC GLUCOMTR-MCNC: 115 MG/DL (ref 70–99)
GLUCOSE BLDC GLUCOMTR-MCNC: 126 MG/DL (ref 70–99)
GLUCOSE BLDC GLUCOMTR-MCNC: 126 MG/DL (ref 70–99)
GLUCOSE BLDC GLUCOMTR-MCNC: 127 MG/DL (ref 70–99)
GLUCOSE BLDC GLUCOMTR-MCNC: 132 MG/DL (ref 70–99)
GLUCOSE BLDC GLUCOMTR-MCNC: 136 MG/DL (ref 70–99)
GLUCOSE BLDC GLUCOMTR-MCNC: 143 MG/DL (ref 70–99)
GLUCOSE BLDC GLUCOMTR-MCNC: 153 MG/DL (ref 70–99)
GLUCOSE BLDC GLUCOMTR-MCNC: 205 MG/DL (ref 70–99)
GLUCOSE SERPL-MCNC: 138 MG/DL (ref 70–99)
HCT VFR BLD AUTO: 25.5 % (ref 35–47)
HGB BLD-MCNC: 8.3 G/DL (ref 11.7–15.7)
MAGNESIUM SERPL-MCNC: 2.1 MG/DL (ref 1.6–2.3)
MCH RBC QN AUTO: 30.4 PG (ref 26.5–33)
MCHC RBC AUTO-ENTMCNC: 32.5 G/DL (ref 31.5–36.5)
MCV RBC AUTO: 93 FL (ref 78–100)
PHOSPHATE SERPL-MCNC: 2.9 MG/DL (ref 2.5–4.5)
PLATELET # BLD AUTO: 224 10E9/L (ref 150–450)
POTASSIUM SERPL-SCNC: 3.6 MMOL/L (ref 3.4–5.3)
RBC # BLD AUTO: 2.73 10E12/L (ref 3.8–5.2)
SODIUM SERPL-SCNC: 140 MMOL/L (ref 133–144)
SPECIMEN SOURCE: NORMAL
SPECIMEN SOURCE: NORMAL
TACROLIMUS BLD-MCNC: 12.3 UG/L (ref 5–15)
TME LAST DOSE: NORMAL H
WBC # BLD AUTO: 16.1 10E9/L (ref 4–11)

## 2018-03-11 PROCEDURE — 36415 COLL VENOUS BLD VENIPUNCTURE: CPT | Performed by: INTERNAL MEDICINE

## 2018-03-11 PROCEDURE — 74018 RADEX ABDOMEN 1 VIEW: CPT

## 2018-03-11 PROCEDURE — 25000128 H RX IP 250 OP 636: Performed by: SURGERY

## 2018-03-11 PROCEDURE — 25000131 ZZH RX MED GY IP 250 OP 636 PS 637: Performed by: INTERNAL MEDICINE

## 2018-03-11 PROCEDURE — 25000128 H RX IP 250 OP 636: Performed by: STUDENT IN AN ORGANIZED HEALTH CARE EDUCATION/TRAINING PROGRAM

## 2018-03-11 PROCEDURE — 85027 COMPLETE CBC AUTOMATED: CPT | Performed by: INTERNAL MEDICINE

## 2018-03-11 PROCEDURE — 92526 ORAL FUNCTION THERAPY: CPT | Mod: GN | Performed by: SPEECH-LANGUAGE PATHOLOGIST

## 2018-03-11 PROCEDURE — 25000132 ZZH RX MED GY IP 250 OP 250 PS 637: Performed by: PHYSICIAN ASSISTANT

## 2018-03-11 PROCEDURE — 25000125 ZZHC RX 250: Performed by: DERMATOLOGY

## 2018-03-11 PROCEDURE — 97530 THERAPEUTIC ACTIVITIES: CPT | Mod: GP | Performed by: REHABILITATION PRACTITIONER

## 2018-03-11 PROCEDURE — 80069 RENAL FUNCTION PANEL: CPT | Performed by: INTERNAL MEDICINE

## 2018-03-11 PROCEDURE — 12000006 ZZH R&B IMCU INTERMEDIATE UMMC

## 2018-03-11 PROCEDURE — 83735 ASSAY OF MAGNESIUM: CPT | Performed by: INTERNAL MEDICINE

## 2018-03-11 PROCEDURE — 40000193 ZZH STATISTIC PT WARD VISIT: Performed by: REHABILITATION PRACTITIONER

## 2018-03-11 PROCEDURE — 25000132 ZZH RX MED GY IP 250 OP 250 PS 637: Performed by: SURGERY

## 2018-03-11 PROCEDURE — 00000146 ZZHCL STATISTIC GLUCOSE BY METER IP

## 2018-03-11 PROCEDURE — 25000132 ZZH RX MED GY IP 250 OP 250 PS 637: Performed by: ANESTHESIOLOGY

## 2018-03-11 PROCEDURE — 25000131 ZZH RX MED GY IP 250 OP 636 PS 637: Performed by: THORACIC SURGERY (CARDIOTHORACIC VASCULAR SURGERY)

## 2018-03-11 PROCEDURE — 25000132 ZZH RX MED GY IP 250 OP 250 PS 637: Performed by: STUDENT IN AN ORGANIZED HEALTH CARE EDUCATION/TRAINING PROGRAM

## 2018-03-11 PROCEDURE — 25000132 ZZH RX MED GY IP 250 OP 250 PS 637: Performed by: THORACIC SURGERY (CARDIOTHORACIC VASCULAR SURGERY)

## 2018-03-11 PROCEDURE — 40000225 ZZH STATISTIC SLP WARD VISIT: Performed by: SPEECH-LANGUAGE PATHOLOGIST

## 2018-03-11 PROCEDURE — 80197 ASSAY OF TACROLIMUS: CPT | Performed by: INTERNAL MEDICINE

## 2018-03-11 PROCEDURE — 25000131 ZZH RX MED GY IP 250 OP 636 PS 637: Performed by: PHYSICIAN ASSISTANT

## 2018-03-11 PROCEDURE — 25000125 ZZHC RX 250: Performed by: THORACIC SURGERY (CARDIOTHORACIC VASCULAR SURGERY)

## 2018-03-11 PROCEDURE — 25000125 ZZHC RX 250: Performed by: STUDENT IN AN ORGANIZED HEALTH CARE EDUCATION/TRAINING PROGRAM

## 2018-03-11 PROCEDURE — 71045 X-RAY EXAM CHEST 1 VIEW: CPT

## 2018-03-11 RX ORDER — PIPERACILLIN SODIUM, TAZOBACTAM SODIUM 3; .375 G/15ML; G/15ML
3.38 INJECTION, POWDER, LYOPHILIZED, FOR SOLUTION INTRAVENOUS EVERY 6 HOURS
Status: COMPLETED | OUTPATIENT
Start: 2018-03-11 | End: 2018-03-15

## 2018-03-11 RX ORDER — VALGANCICLOVIR HYDROCHLORIDE 50 MG/ML
450 POWDER, FOR SOLUTION ORAL
Status: DISCONTINUED | OUTPATIENT
Start: 2018-03-13 | End: 2018-03-15

## 2018-03-11 RX ORDER — BUMETANIDE 0.25 MG/ML
1 INJECTION INTRAMUSCULAR; INTRAVENOUS EVERY 12 HOURS
Status: DISCONTINUED | OUTPATIENT
Start: 2018-03-11 | End: 2018-03-13

## 2018-03-11 RX ADMIN — MYCOPHENOLATE MOFETIL 1500 MG: 200 POWDER, FOR SUSPENSION ORAL at 19:14

## 2018-03-11 RX ADMIN — PIPERACILLIN SODIUM AND TAZOBACTAM SODIUM 3.38 G: 3; .375 INJECTION, POWDER, LYOPHILIZED, FOR SOLUTION INTRAVENOUS at 16:50

## 2018-03-11 RX ADMIN — METOPROLOL TARTRATE 25 MG: 25 TABLET, FILM COATED ORAL at 00:47

## 2018-03-11 RX ADMIN — PANTOPRAZOLE SODIUM 40 MG: 40 TABLET, DELAYED RELEASE ORAL at 08:51

## 2018-03-11 RX ADMIN — SULFAMETHOXAZOLE AND TRIMETHOPRIM 1 TABLET: 400; 80 TABLET ORAL at 08:50

## 2018-03-11 RX ADMIN — MYCOPHENOLATE MOFETIL 1500 MG: 200 POWDER, FOR SUSPENSION ORAL at 08:50

## 2018-03-11 RX ADMIN — LEVALBUTEROL TARTRATE 2 PUFF: 45 AEROSOL, METERED ORAL at 20:40

## 2018-03-11 RX ADMIN — LEVALBUTEROL TARTRATE 2 PUFF: 45 AEROSOL, METERED ORAL at 03:52

## 2018-03-11 RX ADMIN — INSULIN GLARGINE 38 UNITS: 100 INJECTION, SOLUTION SUBCUTANEOUS at 17:04

## 2018-03-11 RX ADMIN — PIPERACILLIN SODIUM AND TAZOBACTAM SODIUM 3.38 G: 3; .375 INJECTION, POWDER, LYOPHILIZED, FOR SOLUTION INTRAVENOUS at 21:54

## 2018-03-11 RX ADMIN — HEPARIN SODIUM 550 UNITS/HR: 10000 INJECTION, SOLUTION INTRAVENOUS at 05:18

## 2018-03-11 RX ADMIN — INSULIN ASPART 3 UNITS: 100 INJECTION, SOLUTION INTRAVENOUS; SUBCUTANEOUS at 11:11

## 2018-03-11 RX ADMIN — METOPROLOL TARTRATE 25 MG: 25 TABLET, FILM COATED ORAL at 23:55

## 2018-03-11 RX ADMIN — SILDENAFIL CITRATE 10 MG: 10 POWDER, FOR SUSPENSION ORAL at 11:31

## 2018-03-11 RX ADMIN — MULTIPLE VITAMINS W/ MINERALS TAB 1 TABLET: TAB at 08:50

## 2018-03-11 RX ADMIN — LEVALBUTEROL TARTRATE 2 PUFF: 45 AEROSOL, METERED ORAL at 23:53

## 2018-03-11 RX ADMIN — POTASSIUM CHLORIDE 20 MEQ: 1.5 POWDER, FOR SOLUTION ORAL at 11:14

## 2018-03-11 RX ADMIN — LEVALBUTEROL TARTRATE 2 PUFF: 45 AEROSOL, METERED ORAL at 18:15

## 2018-03-11 RX ADMIN — NYSTATIN 1000000 UNITS: 500000 SUSPENSION ORAL at 08:51

## 2018-03-11 RX ADMIN — LEVALBUTEROL TARTRATE 2 PUFF: 45 AEROSOL, METERED ORAL at 10:15

## 2018-03-11 RX ADMIN — TACROLIMUS 85 MCG/HR: 5 INJECTION, SOLUTION INTRAVENOUS at 08:52

## 2018-03-11 RX ADMIN — BUMETANIDE 1 MG: 0.25 INJECTION INTRAMUSCULAR; INTRAVENOUS at 03:55

## 2018-03-11 RX ADMIN — LEVALBUTEROL TARTRATE 2 PUFF: 45 AEROSOL, METERED ORAL at 11:31

## 2018-03-11 RX ADMIN — SILDENAFIL CITRATE 10 MG: 10 POWDER, FOR SUSPENSION ORAL at 03:53

## 2018-03-11 RX ADMIN — METOPROLOL TARTRATE 25 MG: 25 TABLET, FILM COATED ORAL at 11:31

## 2018-03-11 RX ADMIN — VALGANCICLOVIR HYDROCHLORIDE 450 MG: 50 POWDER, FOR SOLUTION ORAL at 08:51

## 2018-03-11 RX ADMIN — HUMAN INSULIN 2 UNITS/HR: 100 INJECTION, SOLUTION SUBCUTANEOUS at 07:11

## 2018-03-11 RX ADMIN — NYSTATIN 1000000 UNITS: 500000 SUSPENSION ORAL at 11:31

## 2018-03-11 RX ADMIN — BUMETANIDE 1 MG: 0.25 INJECTION INTRAMUSCULAR; INTRAVENOUS at 16:49

## 2018-03-11 RX ADMIN — NYSTATIN 1000000 UNITS: 500000 SUSPENSION ORAL at 20:21

## 2018-03-11 RX ADMIN — Medication 3 MG: at 18:35

## 2018-03-11 RX ADMIN — VANCOMYCIN HYDROCHLORIDE 1250 MG: 10 INJECTION, POWDER, LYOPHILIZED, FOR SOLUTION INTRAVENOUS at 07:09

## 2018-03-11 RX ADMIN — QUETIAPINE 50 MG: 50 TABLET ORAL at 21:54

## 2018-03-11 RX ADMIN — PREDNISOLONE SODIUM PHOSPHATE 12.9 MG: 15 SOLUTION ORAL at 20:21

## 2018-03-11 RX ADMIN — Medication: at 19:15

## 2018-03-11 RX ADMIN — HEPARIN SODIUM 550 UNITS/HR: 10000 INJECTION, SOLUTION INTRAVENOUS at 15:00

## 2018-03-11 RX ADMIN — DEXTROSE MONOHYDRATE 4.5 G: 5 INJECTION, SOLUTION INTRAVENOUS at 03:55

## 2018-03-11 RX ADMIN — NYSTATIN 1000000 UNITS: 500000 SUSPENSION ORAL at 16:49

## 2018-03-11 RX ADMIN — PREDNISOLONE SODIUM PHOSPHATE 12.9 MG: 15 SOLUTION ORAL at 08:51

## 2018-03-11 RX ADMIN — PIPERACILLIN SODIUM AND TAZOBACTAM SODIUM 3.38 G: 3; .375 INJECTION, POWDER, LYOPHILIZED, FOR SOLUTION INTRAVENOUS at 10:15

## 2018-03-11 RX ADMIN — SILDENAFIL CITRATE 10 MG: 10 POWDER, FOR SUSPENSION ORAL at 20:21

## 2018-03-11 ASSESSMENT — ACTIVITIES OF DAILY LIVING (ADL)
ADLS_ACUITY_SCORE: 11

## 2018-03-11 NOTE — PROGRESS NOTES
CVTS Daily Note  3/11/2018  Attending: Toby Hernandez*    S:   No overnight events. Up from ICU last night.  Pt seen at bedside resting comfortably.    Does complain of fatigue and weakness.     Denies F/C/Sweats.  No CP, SOB, or calf pain.    Tolerating tube feeds.  + BM.  - Flatus.    Pain level tolerable. Plan as per Neuro section below.     O:   Vitals:    03/10/18 2000 03/11/18 0000 03/11/18 0400 03/11/18 0454   BP: 118/67 96/68 94/63    BP Location: Right arm Right arm Right arm    Pulse:       Resp: 18 20 20    Temp: 97.6  F (36.4  C) 97.8  F (36.6  C) 98.4  F (36.9  C)    TempSrc: Oral Oral Oral    SpO2: 97% 96% 95%    Weight:    58.9 kg (129 lb 12.8 oz)   Height:         Vitals:    03/09/18 0415 03/10/18 0200 03/11/18 0454   Weight: 60.7 kg (133 lb 13.1 oz) 59.5 kg (131 lb 2.8 oz) 58.9 kg (129 lb 12.8 oz)     Intake/Output Summary (Last 24 hours) at 03/11/18 0717  Last data filed at 03/11/18 0500   Gross per 24 hour   Intake          1232.12 ml   Output             2705 ml   Net         -1472.88 ml       Gen: AAO x 3, pleasant, NAD  CV: RRR, S1S2 normal, no murmurs, rubs, or gallops.   Pulm: CTA, no rhonchi or wheezes  Incision: clean, dry, intact, no erythema  Chest Tube sites: dressings clean and dry, serosanguinous, no air leak, output 390 cc    *  L and R pleural tube at 10 am       Labs:  BMP    Recent Labs  Lab 03/11/18  0555 03/10/18  0330 03/09/18  0430 03/08/18  0403    142 138 142   POTASSIUM 3.6 3.6 3.5 3.9   CHLORIDE 102 104 102 107   CHELSEY 8.2* 8.1* 7.8* 6.8*   CO2 29 26 27 24   BUN 49* 47* 42* 48*   CR 1.58* 1.51* 1.41* 1.33*   * 132* 127* 147*     CBC    Recent Labs  Lab 03/11/18  0555 03/10/18  1318 03/10/18  0330 03/09/18  0430  03/08/18  0403   WBC 16.1*  --  16.4* 15.0*  --  17.8*   RBC 2.73*  --  2.55* 2.51*  --  2.32*   HGB 8.3* 8.6* 7.6* 7.6*  < > 7.1*   HCT 25.5* 26.6* 24.1* 23.4*  --  22.0*   MCV 93  --  95 93  --  95   MCH 30.4  --  29.8 30.3  --  30.6    MCHC 32.5  --  31.5 32.5  --  32.3   RDW 17.5*  --  17.3* 16.9*  --  16.8*     --  184 164  --  178   < > = values in this interval not displayed.  INR    Recent Labs  Lab 03/05/18  1648 03/05/18  1132 03/05/18  0842 03/05/18  0355   INR 1.11 1.13 1.13 1.15*      Hepatic Panel   Lab Results   Component Value Date    AST 34 03/03/2018     Lab Results   Component Value Date    ALT 22 03/10/2018     Lab Results   Component Value Date    ALBUMIN 2.8 03/11/2018     GLUCOSE:     Recent Labs  Lab 03/11/18  0555 03/11/18  0505 03/11/18  0300 03/11/18  0144 03/10/18  2334 03/10/18  2240 03/10/18  2143  03/10/18  0330  03/09/18  0430  03/08/18  0403  03/07/18  0354  03/07/18  0010   *  --   --   --   --   --   --   --  132*  --  127*  --  147*  --  169*  --  208*   BGM  --  132* 127* 153* 145* 138* 148*  < >  --   < >  --   < >  --   < >  --   < >  --    < > = values in this interval not displayed.      Imaging:  reviewed recent imaging  - large enteric air burden pushing up on diaphragm.       A/P:   Kecia Blue is a 57 year old female with PMH significant for dermatomyositis on immunosuppression, eronegative RA, ILD, and pulmonary hypertension, who was admitted for emergent lung transplant work-up on 2/21 for increasing shortness of breath and hypoxia now s/p emergent [facility] ECMO placement on 2/27/18. Now s/p bilateral lung transplant on 3/1 and [facility] ECMO decannulation on 3/3 with Dr. Hernandez.      Pulm:   - Pulm toilet, IS, activity and deep breathing   - Separate Right and Left pleural tubes 3/11  - WBC 16.1, afebrile, no signs or symptoms of infection   - Creatinine 1.58, good UOP.  Up 7 kg since admit and trending down.   - Diuresis with Bumex  - Insulin gtt and prednisolone BID, consider Endocrine consult.   - 6C since 3/10      Discussed with CVTS Fellow   Staff surgeons have been informed of changes through both  verbal and written communication.      Keith Smith PA-C  Cardiothoracic  Surgery  Pager [phone]    7:17 AM   March 11, 2018

## 2018-03-11 NOTE — PLAN OF CARE
Problem: Patient Care Overview  Goal: Plan of Care/Patient Progress Review  Discharge Planner SLP   Patient plan for discharge: Unknown  Current status: Recommend continue dysphagia diet level 3 with thin liquids. Pt to be seated upright, take small bites/sips, and pace self with PO intake. Pt alert and cooperative this session, reporting limited appetite.  Barriers to return to prior living situation: Weakness, fatigue  Recommendations for discharge: Inpatient rehab  Rationale for recommendations: Pt may meet dysphagia goals prior to discharge       Entered by: Concha Ray 03/11/2018 11:51 AM

## 2018-03-11 NOTE — PLAN OF CARE
Problem: Patient Care Overview  Goal: Plan of Care/Patient Progress Review  Discharge Planner PT   Patient plan for discharge: pt open to rehab, either TCU or ARU  Current status:Mod A for bed mobility, min Ax2 sit<>stand and bed to chair  Poor activity tolerance with frequent seated rest breaks sitting EOB with mod A for trunk.  Limited by SOB and discomfort from chest tubes, which improved somewhat with prolonged sitting EOB, worsened again with bed to chair with increased RR/WOB.   Barriers to return to prior living situation: respiratory status, impaired functional mobility, activity tolerance, LE strength.  Recommendations for discharge: TCU  Rationale for recommendations: pt would benefit from continued skilled rehab in order to improve LE strength, balance functional mobility, and activity tolerance and return to PLOF

## 2018-03-11 NOTE — CONSULTS
Diabetes/Hyperglycemia Management Consult    Chief Complaint hyperglycemia secondary to steroids and enteral feeds  Consult requested by: Dr. Angelica Figueroa  History of Present Illness Ms. Kecia Blue is a 57-year-old woman with a history of ILD and pulmonary hypertension, dermatomyositis, RA, and Raynauds syndrome, who is admitted for emergent lung transplant workup on 2/21 for increased shortness of breath and hypoxia, required [facility] ECMO for R heart failure, now s/p bilateral sequential lung transplant on 3/1.   Patient was extubated on 3/7.  She is currently on continuous enteral feeds, Nutren 1.5 at 45 mils per hour.  She is on dysphagia diet 3.  Steroid doses currently prednisone 13 mg twice daily.  She has been on IV insulin infusion.  Since her enteral feeds went back to goal rate on 3/9 around 11 AM she has required between 1-2 units of insulin per hour.  Because of her Raynauds syndrome poct glucose checks from fingertips have not been accurate, so glucoses are being checked using blood from peripheral IV.  Discussed with bedside RN, and plan is to use pt's earlobes for BG checks if IV access is lost.    Kecia does not have a history of diabetes or elevated blood sugars.  She has been on prednisone chronically.  Her dose had been prednisone 5 mg daily, last fall this was increased to 35 mg but then tapered down to 10 mg daily.  She was having regular labs drawn while on higher dose steroids and neither the patient or her  recall having any elevated blood sugars.  She does have a family history of type 2 diabetes: Her father, and several paternal aunts and uncles, paternal grandparents.    Currently Kecia is tolerating her enteral feeds at goal.  Her appetite is low, but she is trying to force herself to eat some food at mealtime.  Yesterday she had a little bit of meatloaf and mashed potatoes, this morning she ate one quarter of a pancake.  She feels like her physical activity level is increasing  daily with therapy.  She is often very winded following her therapy sessions, but at rest she does not have shortness of breath.      Recent Labs  Lab 03/11/18  1440 03/11/18  1246 03/11/18  1013 03/11/18  0855 03/11/18  0716 03/11/18  0555 03/11/18  0505  03/10/18  0330  03/09/18  0430  03/08/18  0403  03/07/18  0354  03/07/18  0010   GLC  --   --   --   --   --  138*  --   --  132*  --  127*  --  147*  --  169*  --  208*   * 115* 136* 126* 143*  --  132*  < >  --   < >  --   < >  --   < >  --   < >  --    < > = values in this interval not displayed.      Diabetes Control:   Lab Results   Component Value Date    A1C 5.5 03/01/2018    A1C Canceled, Test credited 03/01/2018    A1C 5.3 02/27/2018       Factors Impacting Glucose Control: continuous Enteral feeds, steroids, decreased p.o. Intake.      Review of Systems  10 point ROS completed with pertinent positives and negatives noted in the HPI    Past medical, family and social histories are reviewed and updated.    Past Medical History  Past Medical History:   Diagnosis Date     Antisynthetase syndrome (H) 2014     Chronic cough      Dermatomyositis (H)      Dysplasia of cervix, low grade (ESTRADA 1)      ILD (interstitial lung disease) (H)      Osteopenia      Pulmonary hypertension      Raynaud's disease      Seronegative rheumatoid arthritis (H)        Family History  Family History   Problem Relation Age of Onset     Hypertension Mother      Arthritis Mother      Pancreatic Cancer Father      DIABETES Father        Social History  Social History     Social History     Marital status:      Spouse name: N/A     Number of children: N/A     Years of education: N/A     Social History Main Topics     Smoking status: Never Smoker     Smokeless tobacco: Never Used     Alcohol use 0.6 oz/week     1 Glasses of wine per week      Comment: 1 glass 3 times weekly     Drug use: No     Sexual activity: Not Asked     Other Topics Concern     Parent/Sibling W/ Cabg,  "Mi Or Angioplasty Before 65f 55m? No     Social History Narrative     Kecia is , she and her  live in [place] ([facility]).  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  They have 3 daughters (grown) and 3 granddaughters.    Physical Exam  /73  Pulse 88  Temp 98.6  F (37  C) (Oral)  Resp 20  Ht 1.6 m (5' 3\")  Wt 58.9 kg (129 lb 12.8 oz)  SpO2 96%  BMI 22.99 kg/m2    General:  Very pleasant woman, resting in chair, respirations mildly labored but NAD.  is present and supportive.   HEENT: NC/AT, PER and anicteric, non-injected, oral mucous membranes moist.   Lungs: mildly labored respirations (had just been working with PT), no cough  Skin: warm and dry, no lesions.  MSK:  fluid movement of all extremities  Lymp: bilateral LE edema   Mental status:  alert, oriented x3, communicating clearly  Psych:  calm, even mood    Laboratory  Recent Labs   Lab Test  03/11/18   0555  03/10/18   0330   NA  140  142   POTASSIUM  3.6  3.6   CHLORIDE  102  104   CO2  29  26   ANIONGAP  9  12   GLC  138*  132*   BUN  49*  47*   CR  1.58*  1.51*   CHELSEY  8.2*  8.1*     CBC RESULTS:   Recent Labs   Lab Test  03/11/18   0555   WBC  16.1*   RBC  2.73*   HGB  8.3*   HCT  25.5*   MCV  93   MCH  30.4   MCHC  32.5   RDW  17.5*   PLT  224       Liver Function Studies -   Recent Labs   Lab Test  03/11/18   0555  03/10/18   0330   03/03/18   0329   PROTTOTAL   --    --    --   4.0*   ALBUMIN  2.8*  3.3*   < >  2.4*   BILITOTAL   --    --    --   1.5*   ALKPHOS   --    --    --   27*   AST   --    --    --   34   ALT   --   22   < >  29    < > = values in this interval not displayed.       Active Medications  Current Facility-Administered Medications   Medication     piperacillin-tazobactam (ZOSYN) 3.375 g vial to attach to  mL bag     [START ON 3/13/2018] valGANciclovir (VALCYTE) solution 450 mg     bumetanide (BUMEX) injection 1 mg     insulin aspart (NovoLOG) inj (RAPID " ACTING)     insulin aspart (NovoLOG) inj (RAPID ACTING)     tacrolimus (GENERIC EQUIVALENT) suspension 3 mg     insulin glargine (LANTUS) injection 38 Units     insulin aspart (NovoLOG) inj (RAPID ACTING)     Med Instruction - Transition from IV Insulin Infusion to Sub-Q Insulin     vancomycin (VANCOCIN) 1,250 mg in sodium chloride 0.9 % 250 mL intermittent infusion     heparin drip 25,000 units in 0.45% NaCl 250 mL     sulfamethoxazole-trimethoprim (BACTRIM/SEPTRA) 400-80 MG per tablet 1 tablet     sennosides (SENOKOT) syrup 5 mL     multivitamin, therapeutic with minerals (THERA-VIT-M) tablet 1 tablet     zinc oxide (DESITIN) 40 % ointment     potassium phosphate 10 mmol in D5W 250 mL intermittent infusion     potassium phosphate 15 mmol in D5W 250 mL intermittent infusion     potassium phosphate 20 mmol in D5W 500 mL intermittent infusion     potassium phosphate 20 mmol in D5W 250 mL intermittent infusion     potassium phosphate 25 mmol in D5W 500 mL intermittent infusion     metoprolol tartrate (LOPRESSOR) tablet 25 mg     artificial tears ophthalmic ointment     pantoprazole (PROTONIX) suspension 40 mg     QUEtiapine (SEROquel) tablet 50 mg     metoprolol (LOPRESSOR) injection 5 mg     levalbuterol (XOPENEX HFA) Inhaler 2 puff *BEATRICE*     sildenafil (REVATIO) suspension 10 mg     tacrolimus (PROGRAF) 5,000 mcg in D5W 250 mL     dextrose 10 % 1,000 mL infusion     nystatin (MYCOSTATIN) suspension 1,000,000 Units     mycophenolate (GENERIC EQUIVALENT) capsule 1,500 mg    Or     mycophenolate (CELLCEPT BRAND) suspension 1,500 mg     prednisoLONE (ORAPRED) 15 mg/5 mL solution 12.9 mg     naloxone (NARCAN) injection 0.1-0.4 mg     acetaminophen (TYLENOL) tablet 650 mg     oxyCODONE IR (ROXICODONE) tablet 5-10 mg     ondansetron (ZOFRAN-ODT) ODT tab 4 mg    Or     ondansetron (ZOFRAN) injection 4 mg     dextrose 10 % 500 mL infusion     insulin 1 unit/mL in saline (NovoLIN, HumuLIN Regular) drip - ADULT IV Infusion      glucose 40 % gel 15-30 g    Or     dextrose 50 % injection 25-50 mL    Or     glucagon injection 1 mg     potassium chloride SA (K-DUR/KLOR-CON M) CR tablet 20-40 mEq     potassium chloride (KLOR-CON) Packet 20-40 mEq     potassium chloride 10 mEq in 100 mL sterile water intermittent infusion (premix)     potassium chloride 10 mEq in 100 mL intermittent infusion with 10 mg lidocaine     potassium chloride 20 mEq in 50 mL intermittent infusion     magnesium sulfate 2 g in NS intermittent infusion (PharMEDium or FV Cmpd)     magnesium sulfate 4 g in 100 mL sterile water (premade)     No current outpatient prescriptions on file.       Current Diet    Active Diet Order      Dysphagia Diet Level 3 Advanced Thin Liquids (water, ice chips, juice, milk gelatin, ice cream, etc)      Assessment  Ms. Kecia Blue is a 57-year-old woman with a history of ILD and pulmonary hypertension, dermatomyositis, RA, and Raynauds syndrome, who is admitted for emergent lung transplant workup on 2/21 for increased shortness of breath and hypoxia, required [facility] ECMO for R heart failure, now s/p bilateral sequential lung transplant on 3/1.    No history of diabetes.  Kecia is experiencing hyperglycemia secondary to enteral feeds and steroids.  IV insulin rates averaging ~1.75 units/h      Plan  Orders placed to transition to SC insulin:  -glargine 38 units q4h with instructions to stop IV insulin infusion 2 hours after glargine is given.  -Meal aspart 1unit/15g CHO with meals and snacks  -high intensity correction q4h  -monitor glucoses q4h once off IV Insulin infusion-- continue using blood from peripheral line or can use earlobes for glucose checks (b/c of pt's Raynauds).    Anticipate that insulin needs will decrease significantly once she is off enteral feeds and steroids have decreased further.  If she is still requiring some insulin as discharge nears she may candidate for oral DM agent.    We will continue to follow.    Kaitlynn Chin  JONI [phone]    Diabetes Management Team job code: [phone]

## 2018-03-11 NOTE — PROGRESS NOTES
Pulmonary Medicine  Cystic Fibrosis - Lung Transplant Daily Progress Note   March 11, 2018       Patient: Kecia Blue  MRN: [id number]  Transplant Date: 2/21/2018 (POD# 10)  Admission date: 2/21/2018  Hospital Day #18          Assessment and Plan:   Kecia Blue is a 56 y/o female w/PMHx significant for dermatomyositis (on immunosuppression), seronegative RA, ILD, and pulmonary hypertension who was admitted for emergent lung transplant w/u on 2/21 for increased SOB and hypoxia. Required V-A ECMO for right heart failure on 2/27. Now s/p bilateral sequential lung transplant on 3/1, ECMO decannulation on 3/3. Post-op course c/b hemodynamic instability requiring pressors and iFlolan. Was extubated O/N of 3/7, flolan d/c'd, off pressors, who is now sitting up in chair.     Plan Today:  -- Decrease bumex from 1 mg q8h to BID given mild trend up in Cr  -- D/c rob  -- Tacro level pending; continue gtt until level returns then if stable between 10-15, will consider PO dosing 3 and 3.5 mg  -- Endocrinology consult for management of steroid-induced hyperglycemia; appreciate assistance  -- Heparin gtt to 550U/hr (no titration) - consider increasing to low-intensity heparin gtt tomorrow on 3/12  -- CT management for left hemothorax per CVTS  -- Follow-up OSH cultures     ##S/p bilateral lung transplant on 3/1/18 for acute hypoxic/hypercapneic respiratory failure 2/2 ILD  ##Extubated 3/6/18  ##Pulm HTN 2/2 ILD  Adequate graft function. Weaned off iFlolan. On room air and oxygenating well.   - Antibiotics as noted below  - Decrease bumex from 1 mg q8h to BID given gradual slow rise in Cr; no additional metolazone (s/p x1 on 3/10) and no additional albumin planned for 3/11  - Continue aggressive pulmonary toilet with chest physiotherapy QID (acapella/incentive spirometry once extubated)  - Chest tube management per CVTS  - Explant pathology shows NSIP (see path report below in results)    - Agree with continuing  sildenafil    ##Small-mod L pleural effusion, likely hemothorax  Pt required 1 unit pRBCs on 3/8. Will CTM with imaging. If worsening, could require VATS.                                Continue 3-drug immunosuppression:  Goal tacrolimus target level 10-12  - IV tacrolimus @ 85 mcg/hr pending tacrolimus level. If level stable today between 10-15, will consider PO dosing at 3 and 3.5 mg  - MMF 1500 g BID  - Prednisolone 12.9mg BID     Prophylaxis:   PJP: Bactrim  CMV: will begin vangancyclovir 900mg daily on 3/13/18 - POD #8 (ordered)  Fungal: Nystatin       Donor Recipient Intervention   CMV status  Pos   Pos  Valgancyclovir POD#8   EBV status Pos Pos Immune    HSV status N/A  Pos Immune      ##Infectious disease  ##SIRS  No known infectious disease history. SIRS likely a response from multiple procedures (VA ECMO cannulation and decannulation) and transplant surgery. Fever curve improving. Negative recipient bronch culture from time of transplant. Pan cultured 3/4 NGTD.   - Donor culture at [facility] growing MRSA -- continue vancomycin   - Donor culture at OSH preliminarily growing MSSA, Strep pneumoniae, Moraxella catarrhalis, Haemophilus species. Final sensitivities on donor cultures pending. Continue pip/tazo at this time.     # Steroid-induced hyperglycemia.  Transferred out of ICU on insulin drip.   -- Endocrinology consult; appreciate assistance    ##Nutrtition  -restart TF; ADAT  - Dysphagia diet level 3 with thin liquids per SLP    ##Diarrhea  ##Large bowel loops on imaging  ##Pain on defecation--could be anal fissure vs hemorrhoid  Could be 2/2 zosyn or MMF. C diff neg.    --NG tube d/c.   --ADAT     --desitin cream daily BID when removing anal pouch    ##LUE DVT, provoked  Found on U/S on 3/7, started on heparin drip on 3/7, cont hep. Consider increasing to low-intensity heparin gtt tomorrow on 3/12.    ##RADHA:suspect secondary to meds and diuresis.  --decreasing diuresis as above   --follow urine  output  --tacro goal 10-12    FEN: Dysphagia diet level 3 with thin liquids  PPX: On heparin gtt as above  CODE: Full  DISPO: Continued inpatient cares pending further improvement in pulmonary symptoms; monitoring of RADHA     Magnolia Thurman MD  [facility] 0632        Subjective & Interval History:     Last 24 hour vital signs, labs and care team notes reviewed.    Overnight no acute events. Patient transferred from ICU last night. Patient feeling well this morning. No major complaints. Breathing well. No nausea or vomiting.           Review of Systems:     C: negative for fever, chills  INTEGUMENTARY/SKIN: neg for rash, no other new lesions  ENT/MOUTH: no sore throat, no sinus pain, no nasal drainage  RESP: see interval history  CV: some chest pain, no palpitations, + peripheral edema, no orthopnea  GI: no nausea, no vomiting, no reflux symptoms, no change in stools  : rivas  MUSCULOSKELETAL: no myalgias, no arthralgias  ENDOCRINE: blood sugars with adequate control on insulin gtt  NEURO: no headache  PSYCHIATRIC: mood stable          Medications:     Active Medications:    piperacillin-tazobactam  3.375 g Intravenous Q6H     [START ON 3/13/2018] valGANciclovir  450 mg Oral or Feeding Tube Once per day on Tue Thu Sat     bumetanide  1 mg Intravenous Q12H     insulin aspart   Subcutaneous TID w/meals     vancomycin (VANCOCIN) IV  1,250 mg Intravenous Q36H     sulfamethoxazole-trimethoprim  1 tablet Oral or NG Tube Daily     multivitamin, therapeutic with minerals  1 tablet Oral or Feeding Tube Daily     zinc oxide   Topical BID     metoprolol tartrate  25 mg Oral BID     pantoprazole  40 mg Oral or Feeding Tube Daily     QUEtiapine  50 mg Oral At Bedtime     levalbuterol  2 puff Inhalation Q4H JULES     sildenafil  10 mg Oral or Feeding Tube Q8H     nystatin  1,000,000 Units Swish & Swallow 4x Daily     mycophenolate  1,500 mg Oral BID IS    Or     mycophenolate  1,500 mg Oral or NG Tube BID IS     prednisoLONE   0.25 mg/kg (Dosing Weight) Oral or NG Tube BID     Active PRN Medications:  insulin aspart, sennosides, potassium phosphate (KPHOS) in D5W IV, potassium phosphate (KPHOS) in D5W IV, potassium phosphate (KPHOS) in D5W IV, potassium phosphate (KPHOS) in D5W IV, potassium phosphate (KPHOS) in D5W IV, artificial tears, metoprolol, IV fluid REPLACEMENT ONLY, naloxone, acetaminophen, oxyCODONE IR, ondansetron **OR** ondansetron, IV fluid REPLACEMENT ONLY, glucose **OR** dextrose **OR** glucagon, potassium chloride, potassium chloride, potassium chloride, potassium chloride with lidocaine, potassium chloride, magnesium sulfate, magnesium sulfate         Physical Exam:     Constitutional: Frail woman, laying comfortably in bed. On room air, no acute distress.  HEENT: Eyes with pink conjunctivae, no scleral jaundice or injection.  PULM: Diminished breath sounds bilaterally - no distinct crackles or wheezing.  CV: Normal S1 and S2. RRR. Trace LE edema. Bilateral chest tubes.  ABD: Hypoactive BS, soft, nontender, nondistended.   MSK: Moves all extremities. No apparent muscle wasting.    NEURO:  Following simple commands. Answers questions.   SKIN: No rash on limited exam. ?     Lines, Drains, and Devices:  Peripheral IV 03/07/18 Right;Anterior Upper forearm (Active)   Site Assessment [facility] 3/11/2018  4:00 AM   Line Status Infusing 3/11/2018  4:00 AM   Phlebitis Scale 0-->no symptoms 3/11/2018  4:00 AM   Infiltration Scale 0 3/11/2018  4:00 AM   Infiltration Site Treatment Method  None 3/11/2018  4:00 AM   Extravasation? No 3/11/2018  4:00 AM   Number of days:4       Peripheral IV 03/08/18 Right Hand (Active)   Site Assessment [facility] 3/11/2018  4:00 AM   Line Status Infusing 3/11/2018  4:00 AM   Phlebitis Scale 0-->no symptoms 3/11/2018  4:00 AM   Infiltration Scale 0 3/11/2018  4:00 AM   Infiltration Site Treatment Method  None 3/11/2018  4:00 AM   Extravasation? No 3/10/2018  8:00 PM   Number of days:3       Peripheral IV 03/10/18  "Right Upper forearm (Active)   Site Assessment [facility] 3/11/2018  4:00 AM   Line Status Infusing 3/11/2018  4:00 AM   Phlebitis Scale 0-->no symptoms 3/11/2018  4:00 AM   Infiltration Scale 0 3/11/2018  4:00 AM   Infiltration Site Treatment Method  None 3/11/2018  4:00 AM   Extravasation? No 3/11/2018  4:00 AM   Number of days:1            Data:     All vital signs, laboratory and imaging data for the past 24 hours reviewed.      Vital signs:  Temp: 97.8  F (36.6  C) Temp src: Oral BP: 97/62   Heart Rate: 101 Resp: 19 SpO2: 91 % O2 Device: None (Room air) Oxygen Delivery: 2 LPM Height: 160 cm (5' 3\") Weight: 58.9 kg (129 lb 12.8 oz)    Weight trend:   Vitals:    03/09/18 0415 03/10/18 0200 03/11/18 0454   Weight: 60.7 kg (133 lb 13.1 oz) 59.5 kg (131 lb 2.8 oz) 58.9 kg (129 lb 12.8 oz)        Intake/Output Summary (Last 24 hours) at 03/11/18 1031  Last data filed at 03/11/18 0800   Gross per 24 hour   Intake          1318.82 ml   Output             2675 ml   Net         -1356.18 ml       Labs    CMP:     Recent Labs  Lab 03/11/18  0555 03/10/18  0330 03/09/18  0430 03/08/18  0403  03/07/18  0354    142 138 142  --  146*   POTASSIUM 3.6 3.6 3.5 3.9  < > 3.8   CHLORIDE 102 104 102 107  --  112*   CO2 29 26 27 24  --  25   ANIONGAP 9 12 10 11  --  9   * 132* 127* 147*  --  169*   BUN 49* 47* 42* 48*  --  57*   CR 1.58* 1.51* 1.41* 1.33*  --  1.40*   GFRESTIMATED 34* 36* 39* 41*  --  39*   GFRESTBLACK 41* 43* 47* 50*  --  47*   CHELSEY 8.2* 8.1* 7.8* 6.8*  --  7.7*   MAG 2.1 1.7 2.0 2.2  < > 2.5*   PHOS 2.9 3.2 3.5 2.8  < > 2.4*   ALBUMIN 2.8* 3.3* 3.1* 1.8*  --  2.0*   ALT  --  22 21 28  --  39   < > = values in this interval not displayed.  CBC:     Recent Labs  Lab 03/11/18  0555 03/10/18  1318 03/10/18  0330 03/09/18  0430  03/08/18  0403   WBC 16.1*  --  16.4* 15.0*  --  17.8*   RBC 2.73*  --  2.55* 2.51*  --  2.32*   HGB 8.3* 8.6* 7.6* 7.6*  < > 7.1*   HCT 25.5* 26.6* 24.1* 23.4*  --  22.0*   MCV 93  --  95 " 93  --  95   MCH 30.4  --  29.8 30.3  --  30.6   MCHC 32.5  --  31.5 32.5  --  32.3   RDW 17.5*  --  17.3* 16.9*  --  16.8*     --  184 164  --  178   < > = values in this interval not displayed.  INR:     Recent Labs  Lab 03/05/18  1648 03/05/18  1132 03/05/18  0842 03/05/18  0355   INR 1.11 1.13 1.13 1.15*     Glucose:   Recent Labs  Lab 03/11/18  0855 03/11/18  0716 03/11/18  0555 03/11/18  0505 03/11/18  0300 03/11/18  0144 03/10/18  2334  03/10/18  0330  03/09/18  0430  03/08/18  0403  03/07/18  0354  03/07/18  0010   GLC  --   --  138*  --   --   --   --   --  132*  --  127*  --  147*  --  169*  --  208*   * 143*  --  132* 127* 153* 145*  < >  --   < >  --   < >  --   < >  --   < >  --    < > = values in this interval not displayed.  Blood Gas:   Recent Labs  Lab 03/07/18  0355 03/07/18  0354 03/07/18  0010  03/06/18 2039   PHV 7.43  --  7.42  --  7.37   PCO2V 41  --  42  --  45   PO2V 31  --  32  --  33   HCO3V 28  --  27  --  26   LASHAUN 3.1  --  2.4  --  0.7   O2PER 2L 2L 2L  < > 3L   < > = values in this interval not displayed.  Culture Data     Recent Labs  Lab 03/06/18  1330 03/04/18  2057 03/04/18  1417 03/04/18  1411   CULT Culture negative after 3 days  No growth No growth Culture negative monitoring continues Culture negative monitoring continues     Virology Data:   Lab Results   Component Value Date    FLUAH1 Negative 02/27/2018    FLUAH3 Negative 02/27/2018    [id number] Negative 02/27/2018    IFLUB Negative 02/27/2018    RSVA Negative 02/27/2018    RSVB Negative 02/27/2018    PIV1 Negative 02/27/2018    PIV2 Negative 02/27/2018    PIV3 Negative 02/27/2018    HMPV Negative 02/27/2018    HRVS Negative 02/27/2018    ADVBE Negative 02/27/2018    ADVC Negative 02/27/2018     Historical CMV results (last 3 of prior testing):  Lab Results   Component Value Date    CMVQNT Canceled, Test credited (A) 03/01/2018    CMVQNT Canceled, Test credited (A) 03/01/2018     Lab Results   Component Value  Date    CMVLOG Canceled, Test credited 03/01/2018    CMVLOG Canceled, Test credited 03/01/2018     Urine Studies    Recent Labs   Lab Test  03/04/18   1425   URINEPH  5.5   NITRITE  Negative   LEUKEST  Negative   WBCU  5     CXR 3/11/2018.  1. Stable support devices. No pneumothorax.  2. Stable bilateral perihilar and basilar opacities, pulmonary  vascular congestion and atelectasis.    Explant Pathology  FINAL DIAGNOSIS:   A. LUNG, LEFT NATIVE, PNEUMONECTOMY:   - Nonspecific interstitial pneumonitis (NSIP) with large areas of   end-stage fibrosis, [place]org[place]-airway foreign body   giant cell reaction with cholesterol clefts suggestive of aspiration, and   foci or organizing pneumonia.   - Six benign hilar lymph nodes.     B. LUNG, RIGHT NATIVE, PNEUMONECTOMY:   - Nonspecific interstitial pneumonitis (NSIP) with large areas of   end-stage fibrosis, georges-airway foreign body   giant cell reaction with cholesterol clefts suggestive of aspiration, and   foci or organizing pneumonia.   - Seven benign hilar lymph nodes.

## 2018-03-11 NOTE — PLAN OF CARE
Problem: Patient Care Overview  Goal: Plan of Care/Patient Progress Review  Outcome: Improving  End Of Shift Progress Note:    Temp: 97.6  F (36.4  C) oral  BP: 118/67  Heart Rate: 105  Resp: 18  SpO2: 97 % on RA    Neuro: alert, oriented, follows commands, calls appropriately, weakness of all extremities, PERRLA  Cardiac: BP WNL, pulse tachy, denied chest pain  Respiratory: sating well on RA, no dyspnea noted, lungs crackles in bases  GI/: bowel sounds active throughout, no BM this shift, adequate urine output  Diet/appetite: NDD3 with thin liquids, tolerating, no concerns  Activity: remains in bed, A1-2 with transfers  Pain: denied  Skin: midsternal incision-approximated, no concerns; L arm edema r/t DVT; L neck-old IJ site, skin tears from tegaderm removal, no concerns  LDA's: CT x2- to -20 suction, small output noted, drsg in place; rivas-patent, good output, cares completed, no concerns; NJ-bridled, to enteral feeds, no concerns; rectal pouch-in place, will remove as it falls off, no longer needed    Plan: Continue with plan of care. Notify primary team with changes.  To do: remove rectal pouch as it begins to fall off, continue with Q1H BG checks and insulin gtt management, monitor respiratory status for any changes, remind to use lung txp pillow, encourage PO intake      Ciara Garduno RN  03/10/18  10:48 PM        Problem: Chronic Respiratory Difficulty Comorbidity  Goal: Chronic Respiratory Difficulty  Patient comorbidity will be monitored for signs and symptoms of Respiratory Difficulty (Chronic) condition.  Problems will be absent, minimized or managed by discharge/transition of care.   Outcome: Improving  Patient is sating well on RA, lungs crackles in bases, no acute concerns.

## 2018-03-11 NOTE — PLAN OF CARE
Problem: Patient Care Overview  Goal: Plan of Care/Patient Progress Review  OT 6B: Cancel - pt busy with other healthcare providers this AM, therapist unable to check back at later time. Will reschedule per POC.

## 2018-03-11 NOTE — PLAN OF CARE
"Problem: Patient Care Overview  Goal: Plan of Care/Patient Progress Review  Outcome: Improving  BP 97/62 (BP Location: Right arm)  Pulse 88  Temp 97.8  F (36.6  C) (Oral)  Resp 19  Ht 1.6 m (5' 3\")  Wt 58.9 kg (129 lb 12.8 oz)  SpO2 91%  BMI 22.99 kg/m2    Neuro: Pt is A&O with some episodes of mild confusion.   Cardiac: BPs soft 90s/60s, Tachycardic -110 denies any chest pain.  Respiratory: sating well on RA, some dyspnea on exertion, LS crackles at LL  GI/: One BM 100mL per rectal-pouch, adequate urine output  Diet/appetite: NDD3 with thin liquids, tolerating. TF continuously 45mL/hr  Activity: remains in bed, A1 with repositioning   Pain: denied  Skin: midsternal incision-approximated, no concerns; L arm edema r/t DVT; L neck-old IJ site, skin tears from tegaderm removal, no new deficits noted.  LDA's: 3 PIV all infusing; CT x2- to -20 suction, small output noted, drsg in place; rivas-patent, good output, cares completed; NJ-bridled, TF at 45mL/hr continuously; rectal pouch removed on overnights; insulin gtt Q2H BG per algorithm 2.      Plan: Continue with plan of care. Notify primary team with changes.         Problem: Chronic Respiratory Difficulty Comorbidity  Intervention: Promote Respiratory Management  Pt is adequately using IS and acapella.    Goal: Chronic Respiratory Difficulty  Patient comorbidity will be monitored for signs and symptoms of Respiratory Difficulty (Chronic) condition.  Problems will be absent, minimized or managed by discharge/transition of care.   Outcome: Improving  Pt has been maintaining O2 stats above 95% on R/A.       "

## 2018-03-11 NOTE — PLAN OF CARE
Problem: Lung Transplantation  Goal: Lung Transplantation  Signs and symptoms of listed problems will be absent or manageable.   Outcome: Improving  Pt A&Ox4. BP soft but stable. Pt on RA. CVTS  the CT today. Each CT now to individual atrium and both to -20 suction.  Pt on insulin gtt, checking [id number], BS range 115-136. Pt on algorithm #2, receiving 1-2 units/hour. Pt also placed on carb coverage. Tac gtt to be d/c'd at 2100. Heparin gtt continuous at 550ml/hr straight rated. Basilio d/c'd at noon, needs to void. BMx2 this shift, loose. Pt up to chair x1 for 2 hours, tolerated well.   and multiple family members at bedside, very helpful with cares. Will cont to monitor.

## 2018-03-11 NOTE — PROGRESS NOTES
Pulmonary Medicine  Cystic Fibrosis - Lung Transplant Daily Progress Note   March 11, 2018       Patient: Kecia Blue  MRN: [id number]  Transplant Date: 2/21/2018 (POD# 10)  Admission date: 2/21/2018  Hospital Day #18          Assessment and Plan:   Kecia Blue is a 58 y/o female w/PMHx significant for dermatomyositis (on immunosuppression), seronegative RA, ILD, and pulmonary hypertension who was admitted for emergent lung transplant w/u on 2/21 for increased SOB and hypoxia. Required V-A ECMO for right heart failure on 2/27. Now s/p bilateral sequential lung transplant on 3/1, ECMO decannulation on 3/3. Post-op course c/b hemodynamic instability requiring pressors and iFlolan. Was extubated on 3/7, flolan d/c'd, off pressors.  Now on room air working with speech path and PT.     Plan Today:  -- Decrease bumex from 1 mg q8h to BID given mild trend up in Cr  -- D/c rob  -- Tacro level 12.3; dc gtt tonight, PO dosing 3 BID to start 3/11 in the pm  -- Endocrinology consult for management of steroid-induced hyperglycemia; appreciate assistance  -- Heparin gtt to 550U/hr (no titration) - consider increasing to low-intensity heparin gtt tomorrow on 3/12  -- CT management for left hemothorax per CVTS  -- Follow-up OSH cultures     ##S/p bilateral lung transplant on 3/1/18 for acute hypoxic/hypercapneic respiratory failure 2/2 ILD  ##Extubated 3/6/18  ##Pulm HTN 2/2 ILD  Adequate graft function. Weaned off iFlolan. On room air and oxygenating well.   - Antibiotics as noted below  - Decrease bumex from 1 mg q8h to BID given gradual slow rise in Cr; no additional metolazone (s/p x1 on 3/10) and no additional albumin planned for 3/11  - Continue aggressive pulmonary toilet with chest physiotherapy QID (acapella/incentive spirometry once extubated)  - Chest tube management per CVTS  - Explant pathology shows NSIP (see path report below in results)    - Agree with continuing sildenafil    ##Small-mod L pleural  effusion, likely hemothorax  Pt required 1 unit pRBCs on 3/8. Will CTM with imaging. If worsening, could require VATS.                                Continue 3-drug immunosuppression:  Goal tacrolimus target level 10-12  - IV tacrolimus to dc tonight.  Po dosing start 3/11 at 3 mg BID  - MMF 1500 g BID  - Prednisolone 12.9mg BID     Prophylaxis:   PJP: Bactrim  CMV: will begin vangancyclovir 900mg daily on 3/13/18 - POD #8 (ordered)  Fungal: Nystatin       Donor Recipient Intervention   CMV status  Pos   Pos  Valgancyclovir POD#8   EBV status Pos Pos Immune    HSV status N/A  Pos Immune      ##Infectious disease  ##SIRS  No known infectious disease history. SIRS likely a response from multiple procedures (VA ECMO cannulation and decannulation) and transplant surgery. Fever curve improving. Negative recipient bronch culture from time of transplant. Pan cultured 3/4 NGTD.   - Donor culture at [facility] growing MRSA -- continue vancomycin   - Donor culture at OSH preliminarily growing MSSA, Strep pneumoniae, Moraxella catarrhalis, Haemophilus species. Final sensitivities on donor cultures pending. Continue pip/tazo at this time.     # Steroid-induced hyperglycemia.  Transferred out of ICU on insulin drip.   -- Endocrinology consult; appreciate assistance    ##Nutrtition  -restart TF; ADAT  - Dysphagia diet level 3 with thin liquids per SLP    ##Diarrhea  ##Large bowel loops on imaging  ##Pain on defecation--could be anal fissure vs hemorrhoid  Could be 2/2 zosyn or MMF. C diff neg.    --NG tube d/c.   --ADAT     --desitin cream daily BID when removing anal pouch    ##LUE DVT, provoked  Found on U/S on 3/7, started on heparin drip on 3/7, cont hep. Consider increasing to low-intensity heparin gtt tomorrow on 3/12.    ##RADHA:suspect secondary to meds and diuresis.  --decreasing diuresis as above   --follow urine output  --tacro goal 10-12    FEN: Dysphagia diet level 3 with thin liquids  PPX: On heparin gtt as above  CODE:  Full  DISPO: Continued inpatient cares pending further improvement in pulmonary symptoms; monitoring of RADHA     Angelica Figueroa MD MPH   of Medicine          Subjective & Interval History:     Last 24 hour vital signs, labs and care team notes reviewed.    Overnight no acute events. Patient transferred from ICU last night. Patient feeling well this morning. No major complaints. Breathing well. No nausea or vomiting.  Did not sleep well after the transfer.  Excited to get rid of some IV and tubes.           Review of Systems:     C: negative for fever, chills  INTEGUMENTARY/SKIN: neg for rash, no other new lesions  ENT/MOUTH: no sore throat, no sinus pain, no nasal drainage  RESP: see interval history  CV: some chest pain, no palpitations, + peripheral edema, no orthopnea  GI: no nausea, no vomiting, no reflux symptoms, no change in stools  : rivas  MUSCULOSKELETAL: no myalgias, no arthralgias  ENDOCRINE: blood sugars with adequate control on insulin gtt  NEURO: no headache  PSYCHIATRIC: mood stable          Medications:     Active Medications:    piperacillin-tazobactam  3.375 g Intravenous Q6H     [START ON 3/13/2018] valGANciclovir  450 mg Oral or Feeding Tube Once per day on Tue Thu Sat     bumetanide  1 mg Intravenous Q12H     insulin aspart   Subcutaneous TID w/meals     vancomycin (VANCOCIN) IV  1,250 mg Intravenous Q36H     sulfamethoxazole-trimethoprim  1 tablet Oral or NG Tube Daily     multivitamin, therapeutic with minerals  1 tablet Oral or Feeding Tube Daily     zinc oxide   Topical BID     metoprolol tartrate  25 mg Oral BID     pantoprazole  40 mg Oral or Feeding Tube Daily     QUEtiapine  50 mg Oral At Bedtime     levalbuterol  2 puff Inhalation Q4H JULES     sildenafil  10 mg Oral or Feeding Tube Q8H     nystatin  1,000,000 Units Swish & Swallow 4x Daily     mycophenolate  1,500 mg Oral BID IS    Or     mycophenolate  1,500 mg Oral or NG Tube BID IS     prednisoLONE  0.25  mg/kg (Dosing Weight) Oral or NG Tube BID     Active PRN Medications:  insulin aspart, sennosides, potassium phosphate (KPHOS) in D5W IV, potassium phosphate (KPHOS) in D5W IV, potassium phosphate (KPHOS) in D5W IV, potassium phosphate (KPHOS) in D5W IV, potassium phosphate (KPHOS) in D5W IV, artificial tears, metoprolol, IV fluid REPLACEMENT ONLY, naloxone, acetaminophen, oxyCODONE IR, ondansetron **OR** ondansetron, IV fluid REPLACEMENT ONLY, glucose **OR** dextrose **OR** glucagon, potassium chloride, potassium chloride, potassium chloride, potassium chloride with lidocaine, potassium chloride, magnesium sulfate, magnesium sulfate         Physical Exam:     Constitutional: Frail woman, laying comfortably in bed. On room air, no acute distress.  HEENT: Eyes with pink conjunctivae, no scleral jaundice or injection.  PULM: Diminished breath sounds bilaterally - no distinct crackles or wheezing.  CV: Normal S1 and S2. RRR. Trace LE edema. Bilateral chest tubes.  ABD: Hypoactive BS, soft, nontender, nondistended.   MSK: Moves all extremities. + apparent muscle wasting.    NEURO:  Following simple commands. Answers questions.   SKIN: No rash on limited exam. ?     Lines, Drains, and Devices:  Peripheral IV 03/07/18 Right;Anterior Upper forearm (Active)   Site Assessment [facility] 3/11/2018  4:00 AM   Line Status Infusing 3/11/2018  4:00 AM   Phlebitis Scale 0-->no symptoms 3/11/2018  4:00 AM   Infiltration Scale 0 3/11/2018  4:00 AM   Infiltration Site Treatment Method  None 3/11/2018  4:00 AM   Extravasation? No 3/11/2018  4:00 AM   Number of days:4       Peripheral IV 03/08/18 Right Hand (Active)   Site Assessment WDL 3/11/2018  4:00 AM   Line Status Infusing 3/11/2018  4:00 AM   Phlebitis Scale 0-->no symptoms 3/11/2018  4:00 AM   Infiltration Scale 0 3/11/2018  4:00 AM   Infiltration Site Treatment Method  None 3/11/2018  4:00 AM   Extravasation? No 3/10/2018  8:00 PM   Number of days:3       Peripheral IV 03/10/18 Right  "Upper forearm (Active)   Site Assessment WDL 3/11/2018  4:00 AM   Line Status Infusing 3/11/2018  4:00 AM   Phlebitis Scale 0-->no symptoms 3/11/2018  4:00 AM   Infiltration Scale 0 3/11/2018  4:00 AM   Infiltration Site Treatment Method  None 3/11/2018  4:00 AM   Extravasation? No 3/11/2018  4:00 AM   Number of days:1            Data:     All vital signs, laboratory and imaging data for the past 24 hours reviewed.      Vital signs:  Temp: 98.6  F (37  C) Temp src: Oral BP: (!) 88/76 (denies symptoms, RN notified)   Heart Rate: 105 Resp: 19 SpO2: 96 % O2 Device: None (Room air) Oxygen Delivery: 2 LPM Height: 160 cm (5' 3\") Weight: 58.9 kg (129 lb 12.8 oz)    Weight trend:   Vitals:    03/09/18 0415 03/10/18 0200 03/11/18 0454   Weight: 60.7 kg (133 lb 13.1 oz) 59.5 kg (131 lb 2.8 oz) 58.9 kg (129 lb 12.8 oz)        Intake/Output Summary (Last 24 hours) at 03/11/18 1031  Last data filed at 03/11/18 0800   Gross per 24 hour   Intake          1318.82 ml   Output             2675 ml   Net         -1356.18 ml       Labs    CMP:     Recent Labs  Lab 03/11/18  0555 03/10/18  0330 03/09/18  0430 03/08/18  0403  03/07/18  0354    142 138 142  --  146*   POTASSIUM 3.6 3.6 3.5 3.9  < > 3.8   CHLORIDE 102 104 102 107  --  112*   CO2 29 26 27 24  --  25   ANIONGAP 9 12 10 11  --  9   * 132* 127* 147*  --  169*   BUN 49* 47* 42* 48*  --  57*   CR 1.58* 1.51* 1.41* 1.33*  --  1.40*   GFRESTIMATED 34* 36* 39* 41*  --  39*   GFRESTBLACK 41* 43* 47* 50*  --  47*   CHELSEY 8.2* 8.1* 7.8* 6.8*  --  7.7*   MAG 2.1 1.7 2.0 2.2  < > 2.5*   PHOS 2.9 3.2 3.5 2.8  < > 2.4*   ALBUMIN 2.8* 3.3* 3.1* 1.8*  --  2.0*   ALT  --  22 21 28  --  39   < > = values in this interval not displayed.  CBC:     Recent Labs  Lab 03/11/18  0555 03/10/18  1318 03/10/18  0330 03/09/18  0430  03/08/18  0403   WBC 16.1*  --  16.4* 15.0*  --  17.8*   RBC 2.73*  --  2.55* 2.51*  --  2.32*   HGB 8.3* 8.6* 7.6* 7.6*  < > 7.1*   HCT 25.5* 26.6* 24.1* 23.4*  " --  22.0*   MCV 93  --  95 93  --  95   MCH 30.4  --  29.8 30.3  --  30.6   MCHC 32.5  --  31.5 32.5  --  32.3   RDW 17.5*  --  17.3* 16.9*  --  16.8*     --  184 164  --  178   < > = values in this interval not displayed.  INR:     Recent Labs  Lab 03/05/18  1648 03/05/18  1132 03/05/18  0842 03/05/18  0355   INR 1.11 1.13 1.13 1.15*     Glucose:   Recent Labs  Lab 03/11/18  1246 03/11/18  1013 03/11/18  0855 03/11/18  0716 03/11/18  0555 03/11/18  0505 03/11/18  0300  03/10/18  0330  03/09/18  0430  03/08/18  0403  03/07/18  0354  03/07/18  0010   GLC  --   --   --   --  138*  --   --   --  132*  --  127*  --  147*  --  169*  --  208*   * 136* 126* 143*  --  132* 127*  < >  --   < >  --   < >  --   < >  --   < >  --    < > = values in this interval not displayed.  Blood Gas:   Recent Labs  Lab 03/07/18  0355 03/07/18  0354 03/07/18  0010  03/06/18 2039   PHV 7.43  --  7.42  --  7.37   PCO2V 41  --  42  --  45   PO2V 31  --  32  --  33   HCO3V 28  --  27  --  26   LASHAUN 3.1  --  2.4  --  0.7   O2PER 2L 2L 2L  < > 3L   < > = values in this interval not displayed.  Culture Data     Recent Labs  Lab 03/06/18  1330 03/04/18  2057 03/04/18  1417 03/04/18  1411   CULT Culture negative after 3 days  No growth No growth No growth No growth     Virology Data:   Lab Results   Component Value Date    FLUAH1 Negative 02/27/2018    FLUAH3 Negative 02/27/2018    GO3412 Negative 02/27/2018    IFLUB Negative 02/27/2018    RSVA Negative 02/27/2018    RSVB Negative 02/27/2018    PIV1 Negative 02/27/2018    PIV2 Negative 02/27/2018    PIV3 Negative 02/27/2018    HMPV Negative 02/27/2018    HRVS Negative 02/27/2018    ADVBE Negative 02/27/2018    ADVC Negative 02/27/2018     Historical CMV results (last 3 of prior testing):  Lab Results   Component Value Date    CMVQNT Canceled, Test credited (A) 03/01/2018    CMVQNT Canceled, Test credited (A) 03/01/2018     Lab Results   Component Value Date    CMVLOG Canceled, Test  credited 03/01/2018    CMVLOG Canceled, Test credited 03/01/2018     Urine Studies    Recent Labs   Lab Test  03/04/18   1425   URINEPH  5.5   NITRITE  Negative   LEUKEST  Negative   WBCU  5     CXR 3/11/2018.  1. Stable support devices. No pneumothorax.  2. Stable bilateral perihilar and basilar opacities, pulmonary  vascular congestion and atelectasis.    Explant Pathology  FINAL DIAGNOSIS:   A. LUNG, LEFT NATIVE, PNEUMONECTOMY:   - Nonspecific interstitial pneumonitis (NSIP) with large areas of   end-stage fibrosis, georges-airway foreign body   giant cell reaction with cholesterol clefts suggestive of aspiration, and   foci or organizing pneumonia.   - Six benign hilar lymph nodes.     B. LUNG, RIGHT NATIVE, PNEUMONECTOMY:   - Nonspecific interstitial pneumonitis (NSIP) with large areas of   end-stage fibrosis, egorges-airway foreign body   giant cell reaction with cholesterol clefts suggestive of aspiration, and   foci or organizing pneumonia.   - Seven benign hilar lymph nodes.

## 2018-03-11 NOTE — PROGRESS NOTES
TRANSFER NOTE  --------------------------------------------------------------------  Transferred from: 4E  Via: bed  Reason for transfer: Patient more appropriate for 6B- improved patient condition  Family: Aware of transfer  Report received from: Marcio Flowers: Received with patient  Chart: Received with patient  Medications: Meds received from old unit with patient    2 RN Skin Assessment Completed By: Yenni Cote, RN  03/10/18  8:12 PM

## 2018-03-11 NOTE — PHARMACY-VANCOMYCIN DOSING SERVICE
Pharmacy Vancomycin Note  Date of Service March 10, 2018  Patient's  [id number]   57 year old, female    Indication: Perioperative Prophylaxis  Goal Trough Level: 15-20 mg/L  Day of Therapy: 11  Current Vancomycin regimen:  1000 mg iv q24    Current estimated CrCl = Estimated Creatinine Clearance: 39.5 mL/min (based on Cr of 1.51).    Creatinine for last 3 days  3/8/2018:  4:03 AM Creatinine 1.33 mg/dL  3/9/2018:  4:30 AM Creatinine 1.41 mg/dL  3/10/2018:  3:30 AM Creatinine 1.51 mg/dL    Recent Vancomycin Levels (past 3 days)  3/10/2018:  5:10 PM Vancomycin Level 26.4 mg/L    Vancomycin IV Administrations (past 72 hours)                   vancomycin (VANCOCIN) 1000 mg in dextrose 5% 200 mL PREMIX (mg) 1,000 mg New Bag 18     1,000 mg New Bag 18     1,000 mg New Bag 18 2214                Nephrotoxins and other renal medications (Future)    Start     Dose/Rate Route Frequency Ordered Stop    18 0600  vancomycin (VANCOCIN) 1,250 mg in sodium chloride 0.9 % 250 mL intermittent infusion      1,250 mg  over 90 Minutes Intravenous EVERY 36 HOURS 03/10/18 1819      18 1000  bumetanide (BUMEX) injection 1 mg      1 mg Intravenous EVERY 8 HOURS 18 0845      18 2300  norepinephrine (LEVOPHED) 16 mg in D5W 250 mL infusion      0.03-0.4 mcg/kg/min × 51.3 kg (Dosing Weight)  1.4-19.2 mL/hr  Intravenous CONTINUOUS 18 2251      18 1600  piperacillin-tazobactam (ZOSYN) 4.5 g in D5W 100 mL intermittent infusion      4.5 g  100 mL/hr over 1 Hours Intravenous EVERY 6 HOURS 18 1153      18 1800  tacrolimus (PROGRAF) 5,000 mcg in D5W 250 mL      85 mcg/hr  4.3 mL/hr  Intravenous CONTINUOUS 18 1748               Contrast Orders - past 72 hours     None          Interpretation of levels and current regimen:  Trough level is  Supratherapeutic @ 26.4 mg/L    Has serum creatinine changed > 50% in last 72 hours: No  Urine output:  good urine output  Renal  Function: AKI_stable    Plan:  1.  Chnge dosing to Vancomycin 1250 mg iv q36h  2.  Pharmacy will check trough levels as appropriate in 1-3 Days.    3. Serum creatinine levels will be ordered daily for the first week of therapy and at least twice weekly for subsequent weeks.      Katelyn Maher, PharmD        .

## 2018-03-11 NOTE — PLAN OF CARE
Problem: Patient Care Overview  Goal: Plan of Care/Patient Progress Review  Outcome: Improving  Condition improving, CVC line d/c stable chest tube output, transferred to chair x3, will work on ambulation tomorrow.    Neuro: WDL  CV: good pulses and perfusion,stable BP, cool extremities from raynaud's  Pulm: Ls clear uppers, crackles lowers, on RA tolerating well  GI: stooling with fecal pouch  : voiding with rivas  Skin: see charting  Pain: well controlled    Continue to monitor and continue aggressive pulmonary toileting

## 2018-03-12 ENCOUNTER — APPOINTMENT (OUTPATIENT)
Dept: OCCUPATIONAL THERAPY | Facility: CLINIC | Age: 58
DRG: 003 | End: 2018-03-12
Attending: INTERNAL MEDICINE
Payer: COMMERCIAL

## 2018-03-12 ENCOUNTER — APPOINTMENT (OUTPATIENT)
Dept: SPEECH THERAPY | Facility: CLINIC | Age: 58
DRG: 003 | End: 2018-03-12
Attending: INTERNAL MEDICINE
Payer: COMMERCIAL

## 2018-03-12 ENCOUNTER — APPOINTMENT (OUTPATIENT)
Dept: PHYSICAL THERAPY | Facility: CLINIC | Age: 58
DRG: 003 | End: 2018-03-12
Attending: INTERNAL MEDICINE
Payer: COMMERCIAL

## 2018-03-12 ENCOUNTER — APPOINTMENT (OUTPATIENT)
Dept: GENERAL RADIOLOGY | Facility: CLINIC | Age: 58
DRG: 003 | End: 2018-03-12
Attending: PHYSICIAN ASSISTANT
Payer: COMMERCIAL

## 2018-03-12 LAB
ANION GAP SERPL CALCULATED.3IONS-SCNC: 12 MMOL/L (ref 3–14)
BUN SERPL-MCNC: 48 MG/DL (ref 7–30)
CALCIUM SERPL-MCNC: 8.4 MG/DL (ref 8.5–10.1)
CHLORIDE SERPL-SCNC: 105 MMOL/L (ref 94–109)
CO2 SERPL-SCNC: 26 MMOL/L (ref 20–32)
CREAT SERPL-MCNC: 1.53 MG/DL (ref 0.52–1.04)
ERYTHROCYTE [DISTWIDTH] IN BLOOD BY AUTOMATED COUNT: 18.2 % (ref 10–15)
GFR SERPL CREATININE-BSD FRML MDRD: 35 ML/MIN/1.7M2
GLUCOSE BLDC GLUCOMTR-MCNC: 114 MG/DL (ref 70–99)
GLUCOSE BLDC GLUCOMTR-MCNC: 139 MG/DL (ref 70–99)
GLUCOSE BLDC GLUCOMTR-MCNC: 141 MG/DL (ref 70–99)
GLUCOSE BLDC GLUCOMTR-MCNC: 142 MG/DL (ref 70–99)
GLUCOSE BLDC GLUCOMTR-MCNC: 145 MG/DL (ref 70–99)
GLUCOSE BLDC GLUCOMTR-MCNC: 147 MG/DL (ref 70–99)
GLUCOSE BLDC GLUCOMTR-MCNC: 184 MG/DL (ref 70–99)
GLUCOSE BLDC GLUCOMTR-MCNC: 212 MG/DL (ref 70–99)
GLUCOSE SERPL-MCNC: 184 MG/DL (ref 70–99)
HCT VFR BLD AUTO: 27 % (ref 35–47)
HGB BLD-MCNC: 8.5 G/DL (ref 11.7–15.7)
INR PPP: 1.07 (ref 0.86–1.14)
MAGNESIUM SERPL-MCNC: 1.7 MG/DL (ref 1.6–2.3)
MCH RBC QN AUTO: 30.1 PG (ref 26.5–33)
MCHC RBC AUTO-ENTMCNC: 31.5 G/DL (ref 31.5–36.5)
MCV RBC AUTO: 96 FL (ref 78–100)
PHOSPHATE SERPL-MCNC: 2.8 MG/DL (ref 2.5–4.5)
PLATELET # BLD AUTO: 228 10E9/L (ref 150–450)
POTASSIUM SERPL-SCNC: 4 MMOL/L (ref 3.4–5.3)
RBC # BLD AUTO: 2.82 10E12/L (ref 3.8–5.2)
SODIUM SERPL-SCNC: 143 MMOL/L (ref 133–144)
WBC # BLD AUTO: 14.9 10E9/L (ref 4–11)

## 2018-03-12 PROCEDURE — 83735 ASSAY OF MAGNESIUM: CPT | Performed by: INTERNAL MEDICINE

## 2018-03-12 PROCEDURE — 25000132 ZZH RX MED GY IP 250 OP 250 PS 637: Performed by: ANESTHESIOLOGY

## 2018-03-12 PROCEDURE — 97110 THERAPEUTIC EXERCISES: CPT | Mod: GO | Performed by: OCCUPATIONAL THERAPIST

## 2018-03-12 PROCEDURE — 84100 ASSAY OF PHOSPHORUS: CPT | Performed by: INTERNAL MEDICINE

## 2018-03-12 PROCEDURE — 00000146 ZZHCL STATISTIC GLUCOSE BY METER IP

## 2018-03-12 PROCEDURE — 25000132 ZZH RX MED GY IP 250 OP 250 PS 637: Performed by: PHYSICIAN ASSISTANT

## 2018-03-12 PROCEDURE — 12000006 ZZH R&B IMCU INTERMEDIATE UMMC

## 2018-03-12 PROCEDURE — 85027 COMPLETE CBC AUTOMATED: CPT | Performed by: INTERNAL MEDICINE

## 2018-03-12 PROCEDURE — 25000128 H RX IP 250 OP 636: Performed by: SURGERY

## 2018-03-12 PROCEDURE — 25000131 ZZH RX MED GY IP 250 OP 636 PS 637: Performed by: THORACIC SURGERY (CARDIOTHORACIC VASCULAR SURGERY)

## 2018-03-12 PROCEDURE — 25000132 ZZH RX MED GY IP 250 OP 250 PS 637: Performed by: SURGERY

## 2018-03-12 PROCEDURE — 71046 X-RAY EXAM CHEST 2 VIEWS: CPT

## 2018-03-12 PROCEDURE — 25000125 ZZHC RX 250: Performed by: STUDENT IN AN ORGANIZED HEALTH CARE EDUCATION/TRAINING PROGRAM

## 2018-03-12 PROCEDURE — 40000193 ZZH STATISTIC PT WARD VISIT: Performed by: REHABILITATION PRACTITIONER

## 2018-03-12 PROCEDURE — 40000133 ZZH STATISTIC OT WARD VISIT: Performed by: OCCUPATIONAL THERAPIST

## 2018-03-12 PROCEDURE — 97530 THERAPEUTIC ACTIVITIES: CPT | Mod: GO | Performed by: OCCUPATIONAL THERAPIST

## 2018-03-12 PROCEDURE — 85610 PROTHROMBIN TIME: CPT | Performed by: SURGERY

## 2018-03-12 PROCEDURE — 92526 ORAL FUNCTION THERAPY: CPT | Mod: GN

## 2018-03-12 PROCEDURE — 97530 THERAPEUTIC ACTIVITIES: CPT | Mod: GP | Performed by: REHABILITATION PRACTITIONER

## 2018-03-12 PROCEDURE — 40000225 ZZH STATISTIC SLP WARD VISIT

## 2018-03-12 PROCEDURE — 25000125 ZZHC RX 250: Performed by: DERMATOLOGY

## 2018-03-12 PROCEDURE — 36415 COLL VENOUS BLD VENIPUNCTURE: CPT | Performed by: SURGERY

## 2018-03-12 PROCEDURE — 25000132 ZZH RX MED GY IP 250 OP 250 PS 637: Performed by: STUDENT IN AN ORGANIZED HEALTH CARE EDUCATION/TRAINING PROGRAM

## 2018-03-12 PROCEDURE — 36415 COLL VENOUS BLD VENIPUNCTURE: CPT | Performed by: INTERNAL MEDICINE

## 2018-03-12 PROCEDURE — 25000131 ZZH RX MED GY IP 250 OP 636 PS 637: Performed by: INTERNAL MEDICINE

## 2018-03-12 PROCEDURE — 71045 X-RAY EXAM CHEST 1 VIEW: CPT

## 2018-03-12 PROCEDURE — 25000132 ZZH RX MED GY IP 250 OP 250 PS 637: Performed by: THORACIC SURGERY (CARDIOTHORACIC VASCULAR SURGERY)

## 2018-03-12 PROCEDURE — 80048 BASIC METABOLIC PNL TOTAL CA: CPT | Performed by: INTERNAL MEDICINE

## 2018-03-12 RX ORDER — WARFARIN SODIUM 4 MG/1
4 TABLET ORAL
Status: COMPLETED | OUTPATIENT
Start: 2018-03-12 | End: 2018-03-12

## 2018-03-12 RX ORDER — ZINC OXIDE
OINTMENT (GRAM) TOPICAL 2 TIMES DAILY PRN
Status: DISCONTINUED | OUTPATIENT
Start: 2018-03-12 | End: 2018-03-20

## 2018-03-12 RX ORDER — SIMETHICONE 80 MG
80 TABLET,CHEWABLE ORAL 2 TIMES DAILY
Status: DISCONTINUED | OUTPATIENT
Start: 2018-03-12 | End: 2018-03-12

## 2018-03-12 RX ORDER — SIMETHICONE 80 MG
80 TABLET,CHEWABLE ORAL EVERY 6 HOURS PRN
Status: DISCONTINUED | OUTPATIENT
Start: 2018-03-12 | End: 2018-03-20

## 2018-03-12 RX ADMIN — PIPERACILLIN SODIUM AND TAZOBACTAM SODIUM 3.38 G: 3; .375 INJECTION, POWDER, LYOPHILIZED, FOR SOLUTION INTRAVENOUS at 03:26

## 2018-03-12 RX ADMIN — LEVALBUTEROL TARTRATE 2 PUFF: 45 AEROSOL, METERED ORAL at 09:26

## 2018-03-12 RX ADMIN — LEVALBUTEROL TARTRATE 2 PUFF: 45 AEROSOL, METERED ORAL at 13:26

## 2018-03-12 RX ADMIN — BUMETANIDE 1 MG: 0.25 INJECTION INTRAMUSCULAR; INTRAVENOUS at 17:19

## 2018-03-12 RX ADMIN — PREDNISOLONE SODIUM PHOSPHATE 12.9 MG: 15 SOLUTION ORAL at 21:16

## 2018-03-12 RX ADMIN — SILDENAFIL CITRATE 10 MG: 10 POWDER, FOR SUSPENSION ORAL at 03:46

## 2018-03-12 RX ADMIN — Medication 2 G: at 13:28

## 2018-03-12 RX ADMIN — NYSTATIN 1000000 UNITS: 500000 SUSPENSION ORAL at 17:27

## 2018-03-12 RX ADMIN — INSULIN ASPART 2 UNITS: 100 INJECTION, SOLUTION INTRAVENOUS; SUBCUTANEOUS at 09:30

## 2018-03-12 RX ADMIN — SULFAMETHOXAZOLE AND TRIMETHOPRIM 1 TABLET: 400; 80 TABLET ORAL at 09:27

## 2018-03-12 RX ADMIN — QUETIAPINE 50 MG: 50 TABLET ORAL at 21:16

## 2018-03-12 RX ADMIN — WARFARIN SODIUM 4 MG: 4 TABLET ORAL at 18:37

## 2018-03-12 RX ADMIN — PANTOPRAZOLE SODIUM 40 MG: 40 TABLET, DELAYED RELEASE ORAL at 09:26

## 2018-03-12 RX ADMIN — NYSTATIN 1000000 UNITS: 500000 SUSPENSION ORAL at 13:26

## 2018-03-12 RX ADMIN — PIPERACILLIN SODIUM AND TAZOBACTAM SODIUM 3.38 G: 3; .375 INJECTION, POWDER, LYOPHILIZED, FOR SOLUTION INTRAVENOUS at 17:25

## 2018-03-12 RX ADMIN — MYCOPHENOLATE MOFETIL 1500 MG: 200 POWDER, FOR SUSPENSION ORAL at 09:27

## 2018-03-12 RX ADMIN — VANCOMYCIN HYDROCHLORIDE 1250 MG: 10 INJECTION, POWDER, LYOPHILIZED, FOR SOLUTION INTRAVENOUS at 18:29

## 2018-03-12 RX ADMIN — BUMETANIDE 1 MG: 0.25 INJECTION INTRAMUSCULAR; INTRAVENOUS at 03:53

## 2018-03-12 RX ADMIN — PIPERACILLIN SODIUM AND TAZOBACTAM SODIUM 3.38 G: 3; .375 INJECTION, POWDER, LYOPHILIZED, FOR SOLUTION INTRAVENOUS at 21:16

## 2018-03-12 RX ADMIN — PIPERACILLIN SODIUM AND TAZOBACTAM SODIUM 3.38 G: 3; .375 INJECTION, POWDER, LYOPHILIZED, FOR SOLUTION INTRAVENOUS at 11:07

## 2018-03-12 RX ADMIN — MYCOPHENOLATE MOFETIL 1500 MG: 200 POWDER, FOR SUSPENSION ORAL at 18:37

## 2018-03-12 RX ADMIN — METOPROLOL TARTRATE 25 MG: 25 TABLET, FILM COATED ORAL at 13:51

## 2018-03-12 RX ADMIN — POTASSIUM CHLORIDE 20 MEQ: 1.5 POWDER, FOR SOLUTION ORAL at 13:28

## 2018-03-12 RX ADMIN — NYSTATIN 1000000 UNITS: 500000 SUSPENSION ORAL at 21:17

## 2018-03-12 RX ADMIN — LEVALBUTEROL TARTRATE 2 PUFF: 45 AEROSOL, METERED ORAL at 21:16

## 2018-03-12 RX ADMIN — MULTIPLE VITAMINS W/ MINERALS TAB 1 TABLET: TAB at 09:28

## 2018-03-12 RX ADMIN — Medication 3 MG: at 18:37

## 2018-03-12 RX ADMIN — NYSTATIN 1000000 UNITS: 500000 SUSPENSION ORAL at 09:28

## 2018-03-12 RX ADMIN — LEVALBUTEROL TARTRATE 2 PUFF: 45 AEROSOL, METERED ORAL at 03:45

## 2018-03-12 RX ADMIN — PREDNISOLONE SODIUM PHOSPHATE 12.9 MG: 15 SOLUTION ORAL at 09:26

## 2018-03-12 RX ADMIN — LEVALBUTEROL TARTRATE 2 PUFF: 45 AEROSOL, METERED ORAL at 17:19

## 2018-03-12 RX ADMIN — SILDENAFIL CITRATE 10 MG: 10 POWDER, FOR SUSPENSION ORAL at 13:26

## 2018-03-12 RX ADMIN — SILDENAFIL CITRATE 10 MG: 10 POWDER, FOR SUSPENSION ORAL at 21:16

## 2018-03-12 RX ADMIN — Medication 3 MG: at 09:27

## 2018-03-12 ASSESSMENT — ACTIVITIES OF DAILY LIVING (ADL)
ADLS_ACUITY_SCORE: 15
ADLS_ACUITY_SCORE: 15
ADLS_ACUITY_SCORE: 11
ADLS_ACUITY_SCORE: 15
ADLS_ACUITY_SCORE: 11
ADLS_ACUITY_SCORE: 15

## 2018-03-12 NOTE — PROGRESS NOTES
Diabetes Consult Daily  Progress Note          Assessment/Plan:     Ms. Kecia Blue is a 57-year-old woman with a history of ILD and pulmonary hypertension, dermatomyositis, RA, and Raynauds syndrome, who is admitted for emergent lung transplant workup on 2/21 for increased shortness of breath and hypoxia, required [facility] ECMO for R heart failure, now s/p bilateral sequential lung transplant on 3/1.    No history of diabetes.  Kecia is experiencing hyperglycemia secondary to enteral feeds and steroids.     BG up to 200s last evening after transitioning to SC insulin yesterday evening (no aspart with supper, but <15g per RN note).  BG down to 180s overnight and now 140s this morning.  Plan to start cycling TFs tonight:    Plan:  -Change from glargine to NPH insulin to accommodate cycled TFs: NPH 28 units q24h at 1800- give when TF rate increases to 55ml/h tonight.  Morning NPH: 6 units qAM.  -continue meal aspart 1unit/15g CHO with meals and snacks.  -Correction aspart: high intensity at 1800, 2200, 0400, medium intensity before bfast and before lunch.  -monitor glucoses ac, hs and 0400.       Outpatient diabetes follow up: tbd  Plan discussed with patient, bedside RN, and RD.           Interval History:   The last 24 hours progress and nursing notes reviewed.  Continuous TFs: Nutren 1.5 - has been running at 45ml/h, just noted that RD placed orders to cycle TFs starting tonight- called and discussed with RD, coordinated so TF plan works with insulin plan.  Plan:    Run at 45ml/h until 1800  1800 increase to 55 ml/h,  2200 increase to 70 ml/h   0800 tomorrow- stop TFs.  Tomorrow night cycled feeds: 70ml/h x 14h from [id number].    Kecia reports feeling same as yesterday- appetite is low, she is pushing herself to eat something at meal times (but usually just small portion of meal).  She did eat some supper- no aspart given, b/c RN noted <15g-- but perhaps this contributed to higher BG at hs.   "Glucoses then trending down slowly overnight and this morning.   She continues feeling winded with activity.            Recent Labs  Lab 03/12/18  0923 03/12/18  0633 03/12/18  0350 03/12/18  0002 03/11/18 2028 03/11/18  1440 03/11/18  1246  03/11/18  0555  03/10/18  0330  03/09/18  0430  03/08/18  0403  03/07/18  0354   GLC  --  184*  --   --   --   --   --   --  138*  --  132*  --  127*  --  147*  --  169*   *  --  184* 212* 205* 126* 115*  < >  --   < >  --   < >  --   < >  --   < >  --    < > = values in this interval not displayed.            Review of Systems:   See interval hx          Medications:       Active Diet Order      Dysphagia Diet Level 3 Advanced Thin Liquids (water, ice chips, juice, milk gelatin, ice cream, etc)     Physical Exam:  Gen: Alert, sitting up in chair, in NAD   HEENT: NC/AT, mucous membranes are moist  Resp: Unlabored at rest on RA    Neuro:oriented x3, communicating clearly  /77 (BP Location: Right arm)  Pulse 88  Temp 97.6  F (36.4  C) (Oral)  Resp 20  Ht 1.6 m (5' 3\")  Wt 59.1 kg (130 lb 4.7 oz)  SpO2 90%  BMI 23.08 kg/m2           Data:     Lab Results   Component Value Date    A1C 5.5 03/01/2018    A1C Canceled, Test credited 03/01/2018    A1C 5.3 02/27/2018              CBC RESULTS:   Recent Labs   Lab Test  03/12/18   0633   WBC  14.9*   RBC  2.82*   HGB  8.5*   HCT  27.0*   MCV  96   MCH  30.1   MCHC  31.5   RDW  18.2*   PLT  228     Recent Labs   Lab Test  03/12/18   0633  03/11/18   0555   NA  143  140   POTASSIUM  4.0  3.6   CHLORIDE  105  102   CO2  26  29   ANIONGAP  12  9   GLC  184*  138*   BUN  48*  49*   CR  1.53*  1.58*   CHELSEY  8.4*  8.2*     Liver Function Studies -   Recent Labs   Lab Test  03/11/18   0555  03/10/18   0330   03/03/18   0329   PROTTOTAL   --    --    --   4.0*   ALBUMIN  2.8*  3.3*   < >  2.4*   BILITOTAL   --    --    --   1.5*   ALKPHOS   --    --    --   27*   AST   --    --    --   34   ALT   --   22   < >  29    < > = " values in this interval not displayed.     Lab Results   Component Value Date    INR 1.07 03/12/2018    INR 1.11 03/05/2018    INR 1.13 03/05/2018    INR 1.13 03/05/2018    INR 1.15 03/05/2018    INR 1.15 03/04/2018    INR 1.18 03/04/2018    INR 1.15 03/04/2018    INR 1.19 03/04/2018    INR 1.16 03/04/2018    INR 1.12 03/04/2018    INR 1.15 03/03/2018                                   I spent a total of 35 minutes bedside and on the inpatient unit managing the glycemic care of Kecia Blue. Over 50% of my time on the unit was spent counseling the patient and/or coordinating care regarding glucose management in the context of continuous TFs changing to cycled TFs.  See note for details.    Kaitlynn Chin PA-C [phone]  Diabetes Management job code [id number]

## 2018-03-12 NOTE — PLAN OF CARE
Problem: Patient Care Overview  Goal: Plan of Care/Patient Progress Review    Discharge Planner OT   Patient plan for discharge: rehab  Current status: Sit>stand with CGA, standing for approx 3 x 30 second bouts this session, limited by dizziness primarily, HR into 120s, VSS otherwise. CGA for transfer bed>chair, taking a few steps. B UE weakness, L weaker than R.  Barriers to return to prior living situation: decreased ADL independence and activity tolerance  Recommendations for discharge: TCU vs ARU at this time due to lower tolerance  Rationale for recommendations: increase functional endurance and ADL independence       Entered by: Chris Chau 03/12/2018 11:15 AM

## 2018-03-12 NOTE — PLAN OF CARE
Problem: Patient Care Overview  Goal: Plan of Care/Patient Progress Review  Outcome: Improving     Neuro: A&O x4. BUE and BLE 4/5. PERRLA.   Cardiac: Sinus rhythm/sinus tach. Some PVC's this evening. B/P WNL.   Respiratory: Sats high 90's on RA. Lung bases diminished. Fine crackles.   GI/: Voiding adequately. Using bedpan and BSC. No BM.   Diet/appetite: Tolerating diet. Tube feeds increased to 55cc/hour at 1800. Feedings now cycled from 4529-7592. Goal of 70cc/hour. To increase by 10cc Q4H.   Activity: Up to chair and BSC with 2A walker/gaitbelt.   Pain: Denies pain.   Skin: Mid sternal incision CDI. R chest incision CDI. Edema to LUE d/t DVT. Dressing to L neck d/t skin tear- CDI. Mepilex to coccyx for prevention.   LDA's: 3 PIV's to RUE. 1 infusing heparin. 1 TKO and meds. 1 SL. NJ patent with tube feeds running. R chest tube site CDI.      Plan: Heparin gtt off at 2359. NPO after midnight. Continue with POC.

## 2018-03-12 NOTE — PROGRESS NOTES
Donor Culture Results    Final positive donor sputum culture results have been uploaded into [facility]. Donor ID [id number].  Dr. Khan notified of results.

## 2018-03-12 NOTE — PROGRESS NOTES
Pulmonary Medicine  Cystic Fibrosis - Lung Transplant Daily Progress Note   March 12, 2018       Patient: Kecia Blue  MRN: [id number]  Transplant Date: 2/21/2018 (POD# 11)  Admission date: 2/21/2018  Hospital Day #19          Assessment and Plan:   Kecia Blue is a 58 y/o female w/PMHx significant for dermatomyositis (on immunosuppression), seronegative RA, ILD, and pulmonary hypertension who was admitted for emergent lung transplant w/u on 2/21 for increased SOB and hypoxia. Required V-A ECMO for right heart failure on 2/27. Now s/p bilateral sequential lung transplant on 3/1, ECMO decannulation on 3/3. Post-op course c/b hemodynamic instability requiring pressors and iFlolan. Was extubated on 3/7, flolan d/c'd, off pressors.  Now on room air working with speech path and PT.     Plan Today:  -- Bronch on 3/13 AM; hold TF at MN and NPO except meds at MN  -- cont  bumex from 1 mg q8h to BID given mild trend up in Cr, today Cr stable  -- tacro PO dosing 3 BID to start 3/11 in the pm; recheck tacro on 3/13  -- started warfarin; cont Heparin gtt to 550U/hr (no titration) - will hold heparin gtt at midnight for bronch in AM  -- CT management for left hemothorax per CVTS  -- OSH resulted, see below; will cont abx until after bronch; reassess then; she is currently covered by current antibiotic regimen     ##S/p bilateral lung transplant on 3/1/18 for acute hypoxic/hypercapneic respiratory failure 2/2 ILD  ##Extubated 3/6/18  ##Pulm HTN 2/2 ILD  Adequate graft function. Weaned off iFlolan. On room air and oxygenating well.   - Antibiotics as noted below  - Decrease bumex from 1 mg q8h to BID given gradual slow rise in Cr; no additional metolazone (s/p x1 on 3/10) and no additional albumin planned at this time  - Continue aggressive pulmonary toilet with chest physiotherapy QID (acapella/incentive spirometry once extubated)  - Chest tube management per CVTS  - Explant pathology shows NSIP (see path report  below in results)    - Agree with continuing sildenafil    ##Small-mod L pleural effusion, likely hemothorax  Pt required 1 unit pRBCs on 3/8. Will CTM with imaging. If worsening, could require VATS.                                Continue 3-drug immunosuppression:  Goal tacrolimus target level 10-12  - cont Po dosing tacro; started 3/11 at 3 mg BID; repeat tacro level on 3/13  - MMF 1500 g BID  - Prednisolone 12.9mg BID     Prophylaxis:   PJP: Bactrim  CMV: will begin vangancyclovir 900mg daily on 3/13/18 - POD #8 (ordered)  Fungal: Nystatin       Donor Recipient Intervention   CMV status  Pos   Pos  Valgancyclovir POD#8   EBV status Pos Pos Immune    HSV status N/A  Pos Immune      ##Infectious disease  ##SIRS  No known infectious disease history. SIRS likely a response from multiple procedures (VA ECMO cannulation and decannulation) and transplant surgery. Fever curve improving. Negative recipient bronch culture from time of transplant. Pan cultured 3/4 NGTD.   - Don[facility] at [facility] growing MRSA -- continue vancomycin   - Donor culture at OSH preliminarily growing MRSA, Strep pneumoniae, Moraxella catarrhalis, Haemophilus species. Continue pip/tazo at this time until after bronch tomorrow.   - See note from PM of 3/11 for donor culture sensitivities (and in results section of this note)  - according to sensitivities, she is covered by vancomycin for all organisisms (MRSA, S pneumonia); can consider D/C'ing zosyn; can consider D/C'ing both after bronch tomorrow    # Steroid-induced hyperglycemia.  Transferred out of ICU on insulin drip.   Orders placed to transition to SC insulin:  -glargine 38 units q24h   -Meal aspart 1unit/15g CHO with meals and snacks  -high intensity correction q4h  -monitor glucoses q4h once off IV Insulin infusion-- continue using blood from peripheral line or can use earlobes for glucose checks (b/c of pt's Raynauds).    ##Nutrtition  -restart TF; ADAT  - Dysphagia diet level 3 with thin  liquids per SLP    ##Diarrhea  ##Large bowel loops on imaging  ##Pain on defecation--could be anal fissure vs hemorrhoid  Could be 2/2 zosyn or MMF. C diff neg.    --NG tube d/c.   --ADAT     --desitin cream daily BID prn    ##LUE DVT, provoked  ##INR goal of 2-3  Found on U/S on 3/7, started on heparin drip on 3/7, cont hep. Starting warfarin on 3/12. Cont Hep gtt until therapeutic.     ##RADHA:suspect secondary to meds and diuresis.  --decreasing diuresis as above   --follow urine output  --tacro goal 10-12    FEN: Dysphagia diet level 3 with thin liquids and TF; Hold TF at MN and also NPO at midnight for bronch in AM. Resume after bronch  PPX: On heparin gtt as above; started her on warfarin, will continue heparin gtt until therapeutic on warfarin. Will hold heparin at MN for bronch tomorrow AM. Restart after bronch.   CODE: Full  DISPO: Continued inpatient cares pending further improvement in pulmonary symptoms; monitoring of RADHA     Pt seen and discussed with Dr. Mcelroy.    Alessandra Lombardi MD PhD   Internal Medicine, PGY 1  p           Subjective & Interval History:     Last 24 hour vital signs, labs and care team notes reviewed.    Overnight no acute events. Patient feeling well this morning. No major complaints. Breathing well. No nausea or vomiting. Is up and moving around to restroom and also with PT/OT. Feels that she is getting stronger. Has a good sense of humor this morning. Does not feel like she has bowel gas.           Review of Systems:     C: negative for fever, chills  INTEGUMENTARY/SKIN: neg for rash, no other new lesions  ENT/MOUTH: no sore throat, no sinus pain, no nasal drainage  RESP: see interval history  CV: some chest pain, no palpitations, + trace peripheral edema, no orthopnea  GI: no nausea, no vomiting, no reflux symptoms, no change in stools  : rivas  MUSCULOSKELETAL: no myalgias, no arthralgias  ENDOCRINE: blood sugars with adequate control on insulin gtt  NEURO: no  headache  PSYCHIATRIC: mood stable          Medications:     Active Medications:    piperacillin-tazobactam  3.375 g Intravenous Q6H     [START ON 3/13/2018] valGANciclovir  450 mg Oral or Feeding Tube Once per day on Tue Thu Sat     bumetanide  1 mg Intravenous Q12H     insulin aspart   Subcutaneous TID w/meals     tacrolimus  3 mg Oral BID IS     insulin aspart  1-12 Units Subcutaneous Q4H     vancomycin (VANCOCIN) IV  1,250 mg Intravenous Q36H     sulfamethoxazole-trimethoprim  1 tablet Oral or NG Tube Daily     multivitamin, therapeutic with minerals  1 tablet Oral or Feeding Tube Daily     zinc oxide   Topical BID     metoprolol tartrate  25 mg Oral BID     pantoprazole  40 mg Oral or Feeding Tube Daily     QUEtiapine  50 mg Oral At Bedtime     levalbuterol  2 puff Inhalation Q4H JULES     sildenafil  10 mg Oral or Feeding Tube Q8H     nystatin  1,000,000 Units Swish & Swallow 4x Daily     mycophenolate  1,500 mg Oral BID IS    Or     mycophenolate  1,500 mg Oral or NG Tube BID IS     prednisoLONE  0.25 mg/kg (Dosing Weight) Oral or NG Tube BID     Active PRN Medications:  Warfarin Therapy Reminder, simethicone, HOLD MEDICATION, insulin aspart, sennosides, potassium phosphate (KPHOS) in D5W IV, potassium phosphate (KPHOS) in D5W IV, potassium phosphate (KPHOS) in D5W IV, potassium phosphate (KPHOS) in D5W IV, potassium phosphate (KPHOS) in D5W IV, artificial tears, metoprolol, IV fluid REPLACEMENT ONLY, naloxone, acetaminophen, oxyCODONE IR, ondansetron **OR** ondansetron, IV fluid REPLACEMENT ONLY, glucose **OR** dextrose **OR** glucagon, potassium chloride, potassium chloride, potassium chloride, potassium chloride with lidocaine, potassium chloride, magnesium sulfate, magnesium sulfate         Physical Exam:     Constitutional: Frail woman,sitting up in chair. On room air, no acute distress.  HEENT: Eyes with pink conjunctivae, no scleral jaundice or injection.  PULM: Diminished breath sounds bilaterally - no  "distinct crackles or wheezing.  CV: Normal S1 and S2. RRR. Trace LE edema. Bilateral chest tubes.  ABD: Hypoactive BS, soft, nontender, nondistended.   MSK: Moves all extremities. + apparent muscle wasting.    NEURO:  Following simple commands. Answers questions.   SKIN: No rash on limited exam. ?     Lines, Drains, and Devices:  Peripheral IV 03/07/18 Right;Anterior Upper forearm (Active)   Site Assessment [facility] 3/11/2018  4:00 AM   Line Status Infusing 3/11/2018  4:00 AM   Phlebitis Scale 0-->no symptoms 3/11/2018  4:00 AM   Infiltration Scale 0 3/11/2018  4:00 AM   Infiltration Site Treatment Method  None 3/11/2018  4:00 AM   Extravasation? No 3/11/2018  4:00 AM   Number of days:4       Peripheral IV 03/08/18 Right Hand (Active)   Site Assessment [facility] 3/11/2018  4:00 AM   Line Status Infusing 3/11/2018  4:00 AM   Phlebitis Scale 0-->no symptoms 3/11/2018  4:00 AM   Infiltration Scale 0 3/11/2018  4:00 AM   Infiltration Site Treatment Method  None 3/11/2018  4:00 AM   Extravasation? No 3/10/2018  8:00 PM   Number of days:3       Peripheral IV 03/10/18 Right Upper forearm (Active)   Site Assessment [facility] 3/11/2018  4:00 AM   Line Status Infusing 3/11/2018  4:00 AM   Phlebitis Scale 0-->no symptoms 3/11/2018  4:00 AM   Infiltration Scale 0 3/11/2018  4:00 AM   Infiltration Site Treatment Method  None 3/11/2018  4:00 AM   Extravasation? No 3/11/2018  4:00 AM   Number of days:1            Data:     All vital signs, laboratory and imaging data for the past 24 hours reviewed.      Vital signs:  Temp: 97.6  F (36.4  C) Temp src: Oral BP: 108/77   Heart Rate: 109 Resp: 20 SpO2: 90 % O2 Device: None (Room air) Oxygen Delivery: 2 LPM Height: 160 cm (5' 3\") Weight: 59.1 kg (130 lb 4.7 oz)    Weight trend:   Vitals:    03/10/18 0200 03/11/18 0454 03/12/18 0400   Weight: 59.5 kg (131 lb 2.8 oz) 58.9 kg (129 lb 12.8 oz) 59.1 kg (130 lb 4.7 oz)        Intake/Output Summary (Last 24 hours) at 03/11/18 1031  Last data filed at 03/11/18 " 0800   Gross per 24 hour   Intake          1318.82 ml   Output             2675 ml   Net         -1356.18 ml       Labs    CMP:     Recent Labs  Lab 03/12/18  0633 03/11/18  0555 03/10/18  0330 03/09/18  0430 03/08/18  0403  03/07/18  0354    140 142 138 142  --  146*   POTASSIUM 4.0 3.6 3.6 3.5 3.9  < > 3.8   CHLORIDE 105 102 104 102 107  --  112*   CO2 26 29 26 27 24  --  25   ANIONGAP 12 9 12 10 11  --  9   * 138* 132* 127* 147*  --  169*   BUN 48* 49* 47* 42* 48*  --  57*   CR 1.53* 1.58* 1.51* 1.41* 1.33*  --  1.40*   GFRESTIMATED 35* 34* 36* 39* 41*  --  39*   GFRESTBLACK 43* 41* 43* 47* 50*  --  47*   CHELSEY 8.4* 8.2* 8.1* 7.8* 6.8*  --  7.7*   MAG 1.7 2.1 1.7 2.0 2.2  < > 2.5*   PHOS 2.8 2.9 3.2 3.5 2.8  < > 2.4*   ALBUMIN  --  2.8* 3.3* 3.1* 1.8*  --  2.0*   ALT  --   --  22 21 28  --  39   < > = values in this interval not displayed.  CBC:     Recent Labs  Lab 03/12/18  0633 03/11/18  0555 03/10/18  1318 03/10/18  0330 03/09/18  0430   WBC 14.9* 16.1*  --  16.4* 15.0*   RBC 2.82* 2.73*  --  2.55* 2.51*   HGB 8.5* 8.3* 8.6* 7.6* 7.6*   HCT 27.0* 25.5* 26.6* 24.1* 23.4*   MCV 96 93  --  95 93   MCH 30.1 30.4  --  29.8 30.3   MCHC 31.5 32.5  --  31.5 32.5   RDW 18.2* 17.5*  --  17.3* 16.9*    224  --  184 164     INR:     Recent Labs  Lab 03/12/18  1123 03/05/18  1648   INR 1.07 1.11     Glucose:   Recent Labs  Lab 03/12/18  1323 03/12/18  0923 03/12/18  0633 03/12/18  0350 03/12/18  0002 03/11/18 2028 03/11/18  1440  03/11/18  0555  03/10/18  0330  03/09/18  0430  03/08/18  0403  03/07/18  0354   GLC  --   --  184*  --   --   --   --   --  138*  --  132*  --  127*  --  147*  --  169*   * 141*  --  184* 212* 205* 126*  < >  --   < >  --   < >  --   < >  --   < >  --    < > = values in this interval not displayed.  Blood Gas:   Recent Labs  Lab 03/07/18  0355 03/07/18  0354 03/07/18  0010  03/06/18 2039   PHV 7.43  --  7.42  --  7.37   PCO2V 41  --  42  --  45   PO2V 31  --  32  --   33   HCO3V 28  --  27  --  26   LASHAUN 3.1  --  2.4  --  0.7   O2PER 2L 2L 2L  < > 3L   < > = values in this interval not displayed.  Culture Data     Recent Labs  Lab 03/06/18  1330   CULT Culture negative after 6 days  No growth     Virology Data:   Lab Results   Component Value Date    FLUAH1 Negative 02/27/2018    FLUAH3 Negative 02/27/2018    BV1077 Negative 02/27/2018    IFLUB Negative 02/27/2018    RSVA Negative 02/27/2018    RSVB Negative 02/27/2018    PIV1 Negative 02/27/2018    PIV2 Negative 02/27/2018    PIV3 Negative 02/27/2018    HMPV Negative 02/27/2018    HRVS Negative 02/27/2018    ADVBE Negative 02/27/2018    ADVC Negative 02/27/2018     Historical CMV results (last 3 of prior testing):  Lab Results   Component Value Date    CMVQNT Canceled, Test credited (A) 03/01/2018    CMVQNT Canceled, Test credited (A) 03/01/2018     Lab Results   Component Value Date    CMVLOG Canceled, Test credited 03/01/2018    CMVLOG Canceled, Test credited 03/01/2018     Urine Studies    Recent Labs   Lab Test  03/04/18   1425   URINEPH  5.5   NITRITE  Negative   LEUKEST  Negative   WBCU  5         Explant Pathology  FINAL DIAGNOSIS:   A. LUNG, LEFT NATIVE, PNEUMONECTOMY:   - Nonspecific interstitial pneumonitis (NSIP) with large areas of   end-stage fibrosis, georges-airway foreign body   giant cell reaction with cholesterol clefts suggestive of aspiration, and   foci or organizing pneumonia.   - Six benign hilar lymph nodes.     B. LUNG, RIGHT NATIVE, PNEUMONECTOMY:   - Nonspecific interstitial pneumonitis (NSIP) with large areas of   end-stage fibrosis, georges-airway foreign body   giant cell reaction with cholesterol clefts suggestive of aspiration, and   foci or organizing pneumonia.   - Seven benign hilar lymph nodes.       Bronchoscopy Cultures from OSH; Donor Cultures  MRSA: Sensitive to the following: clindamycin, erythromycin, gentamicin, linezolid, bactrim, vancomycin; Intermediate to: tetracycline; Resistant to  Oxacillin  S. Pneumoniae: Sensitive to the following: ceftriaxone, clindamycin, levofloxacin, penicillin, bactrim, vancomycin; Resistant to erythromycin    Moraxella catarrhalis is generally sensitive to cephalosporins, erythromycin, doxycycline, bactrim, and beta lactamase inhibitor combos

## 2018-03-12 NOTE — PHARMACY-ANTICOAGULATION SERVICE
Clinical Pharmacy - Warfarin Dosing Consult     Pharmacy has been consulted to manage this patient s warfarin therapy.  Indication: DVT/ PE Treatment  Therapy Goal: INR 2-3  Warfarin Prior to Admission: No  Significant drug interactions: bactrim (increased warfarin exposure), zosyn (increased risk for bleed)  Recent documented change in oral intake/nutrition: No    INR   Date Value Ref Range Status   03/12/2018 1.07 0.86 - 1.14 Final   03/05/2018 1.11 0.86 - 1.14 Final       Recommend warfarin 4 mg today.  Pharmacy will monitor Kecia VILLAFANA [employer]riced[employer]id daily and order warfarin doses to achieve specified goal.      Please contact pharmacy as soon as possible if the warfarin needs to be held for a procedure or if the warfarin goals change.      Alecia Laurent, Fourth Year Pharmacy Student

## 2018-03-12 NOTE — PLAN OF CARE
Problem: Patient Care Overview  Goal: Plan of Care/Patient Progress Review  Outcome: No Change  Temp: 97.9  F (36.6  C) Temp src: Oral BP: 110/75   Heart Rate: 110 Resp: 20 SpO2: 95 % O2 Device: None (Room air)       Neuro: A&Ox4. Drowsy at times.   Cardiac: Sinus tach, rates 100-110s. Denies CP.  Respiratory: Sating >93% on room air, reports minimal to no dyspnea. Upper airways sound congested.  GI/: Incontinent of urine and stool x1; loose.   Diet/appetite: Tolerating tf at goal rate 45/hr, declined PO fluids overnight..  Activity:  Assist of 1-2; repositioned Q2h overnight.  Pain: At acceptable level on current regimen.   Skin: Sternal incision, Right chest incision, right groin; liquid bandage. Coccyx reddened, blanches.  LDA's:  Bilateral chest tubes to -20suction. Small amount output. R PIV x3; Heparin gtt straight rate 550u/hr. Using PIV for blood sugar checks 2/2 raynauds. NJ tube with TF at goal.    Plan: Continue with POC. Notify primary team with changes.    Problem: Chronic Respiratory Difficulty Comorbidity  Goal: Chronic Respiratory Difficulty  Patient comorbidity will be monitored for signs and symptoms of Respiratory Difficulty (Chronic) condition.  Problems will be absent, minimized or managed by discharge/transition of care.   Outcome: Improving  Pt on room air overnight, no issues.

## 2018-03-12 NOTE — PROGRESS NOTES
CLINICAL NUTRITION SERVICES - BRIEF NOTE    Diet:  Recommend continue dysphagia diet level 3 with thin liquids. Pt to be seated upright, take small bites/sips, and pace self with PO intake.  Intakes: 0-25%, poor  Pt reports that she is feeling full from her TF as well as having dysgeusia.   EN: Nutren 1.5 @ goal 45 ml/hr (1080 ml/day) to provide 1620 kcals (32 kcal/kg/day), 73 g PRO (1.5 g/kg/day), 821 ml free H2O, 190 g CHO and 0g Fiber daily.    INTERVENTIONS  Recommendations / Nutrition Prescription  Cont to monitor PO intakes and ability to wean TF further as appetite and PO intakes increase.   Endo following    Implementation  RD to order yury counts  RD to cycle TF to 14 hours as follows (6pm-8am): Nutren 1.5 @ 70 ml/hr x 14 hours, 980ml, 1470 kcals (29), 67 g pro (1.3)- pt with increased need d/t lung transplant  Increase TF by 10 ml @6pm to 55ml/hr and then @ 10pm increase to goal   RD to order Boost Plus chocolate (BID)      Natalia Gutierrez RD, MS, LD  6B- Pager: [phone]

## 2018-03-12 NOTE — PLAN OF CARE
Problem: Patient Care Overview  Goal: Plan of Care/Patient Progress Review  Outcome: Improving  End Of Shift Progress Note:    Temp: 97.9  F (36.6  C) oral  BP: 102/78  Heart Rate: 108  Resp: 20  SpO2: 95 % on RA    Neuro: alert, oriented, follows commands, weakness of all extremities, limited BUE movement, PERRLA  Cardiac: BP WNL, sinus tach with ocasional PACs, denied chest pain, mild edema of BLE, moderate edema of LUE r/t DVT  Respiratory: sating well on RA, lungs clear-crackles/diminished bases, denied SOB, no signs of difficulty noted  GI/: voiding well, continent of bowel and bladder, rare incontinence episode, BM x1 this shift, bowel sounds active throughout  Diet/appetite: tolerating NDD3 diet with thin liquids, TF at goal of 45/hr  Activity: remains in bed, up to chair x1 this shift, A1-2 with walker for transfers  Pain: denied, no signs noted  Skin: midsternal incicion-approximated, no concerns; R chest-approximated, no concerns; L arm edema-r/t DVT, no concerns, will continue to monitor; L neck-skin tear d/t tegaderm removal 3/10/18; coccyx-redness, mepilex in place  LDA's: PIV x3-saline locked x1, infusing x2; NJ-bridled, no concerns; CTx2-separate atriums, drsg intact, no concerns    Plan: Continue with plan of care. Notify primary team with changes.  To do: continue to monitor BG levels, treat as indicated, encourage rest, encourage PO intake, f/u with team in AM re: pt attending txp support group      Ciara Garduno RN  03/11/18  10:24 PM        Problem: Chronic Respiratory Difficulty Comorbidity  Goal: Chronic Respiratory Difficulty  Patient comorbidity will be monitored for signs and symptoms of Respiratory Difficulty (Chronic) condition.  Problems will be absent, minimized or managed by discharge/transition of care.   Outcome: Improving  Patient remains stable on RA, no SOB, no acute distress noted, sating well.

## 2018-03-12 NOTE — PROGRESS NOTES
CVTS Daily Note  3/12/2018  Attending: Toby Hernandez*    S:   States that pain is being controlled. Denies any hallucinations. Breathing ok. Not able to do the IS very well. Working with acapella valve. Appetite is decreased but denies any nausea. Still getting TF. +BM, strength is improving but remains weak.     Denies F/C/Sweats.  No CP, SOB, or calf pain.    Tolerating tube feeds.  + BM.  Pain level tolerable. Plan as per Neuro section below.     O:   Vitals:    03/12/18 0322 03/12/18 0400 03/12/18 0810 03/12/18 1146   BP: 110/75  99/80 108/77   BP Location: Right arm  Right arm Right arm   Pulse:       Resp: 20 22 20   Temp: 97.9  F (36.6  C)  97.7  F (36.5  C) 97.6  F (36.4  C)   TempSrc: Oral  Oral Oral   SpO2: 95%  98% 90%   Weight:  59.1 kg (130 lb 4.7 oz)     Height:         Vitals:    03/10/18 0200 03/11/18 0454 03/12/18 0400   Weight: 59.5 kg (131 lb 2.8 oz) 58.9 kg (129 lb 12.8 oz) 59.1 kg (130 lb 4.7 oz)     Gen: lying in bed, pleasant, NAD, +TF 45 ml/hr  CV: RRR, -edema LE  Pulm: CTA,  ml  Incision: sternal incision D/I   R anterior chest incision D/I  Chest Tube sites: R sided chest tube serousoutput, -leak   L sided chest tube decreased serous output, -leak    Labs:  BMP    Recent Labs  Lab 03/12/18  0633 03/11/18  0555 03/10/18  0330 03/09/18  0430    140 142 138   POTASSIUM 4.0 3.6 3.6 3.5   CHLORIDE 105 102 104 102   CHELSEY 8.4* 8.2* 8.1* 7.8*   CO2 26 29 26 27   BUN 48* 49* 47* 42*   CR 1.53* 1.58* 1.51* 1.41*   * 138* 132* 127*     CBC    Recent Labs  Lab 03/12/18  0633 03/11/18  0555 03/10/18  1318 03/10/18  0330 03/09/18  0430   WBC 14.9* 16.1*  --  16.4* 15.0*   RBC 2.82* 2.73*  --  2.55* 2.51*   HGB 8.5* 8.3* 8.6* 7.6* 7.6*   HCT 27.0* 25.5* 26.6* 24.1* 23.4*   MCV 96 93  --  95 93   MCH 30.1 30.4  --  29.8 30.3   MCHC 31.5 32.5  --  31.5 32.5   RDW 18.2* 17.5*  --  17.3* 16.9*    224  --  184 164     INR    Recent Labs  Lab 03/12/18  1123 03/05/18  1643    INR 1.07 1.11      Hepatic Panel   Lab Results   Component Value Date    AST 34 03/03/2018     Lab Results   Component Value Date    ALT 22 03/10/2018     Lab Results   Component Value Date    ALBUMIN 2.8 03/11/2018     GLUCOSE:     Recent Labs  Lab 03/12/18  1323 03/12/18  0923 03/12/18  0633 03/12/18  0350 03/12/18  0002 03/11/18  2028 03/11/18  1440  03/11/18  0555  03/10/18  0330  03/09/18  0430  03/08/18  0403  03/07/18  0354   GLC  --   --  184*  --   --   --   --   --  138*  --  132*  --  127*  --  147*  --  169*   * 141*  --  184* 212* 205* 126*  < >  --   < >  --   < >  --   < >  --   < >  --    < > = values in this interval not displayed.      Imaging:  reviewed recent imaging  - large enteric air burden pushing up on diaphragm. R and L chest tube present, good aeration      A/P:   Kecia Blue is a 57 year old female with PMH significant for dermatomyositis on immunosuppression, eronegative RA, ILD, and pulmonary hypertension, who was admitted for emergent lung transplant work-up on 2/21 for increasing shortness of breath and hypoxia now s/p emergent VA ECMO placement on 2/27/18. Now s/p bilateral lung transplant on 3/1 and VA ECMO decannulation on 3/3 with Dr. Hernandez.    In isolation due to MRSA from bronchial culture (lung fluid donor).    Pulm:   - Pulm toilet, IS, activity and deep breathing   - Separate Right and Left pleural tubes 3/11, left CT drainage low, no air leak. Removed today 3/12, f/u CXR. Keep right to suction.   - WBC 16.1, afebrile, no signs or symptoms of infection   - Creatinine 1.53, good UOP.  Up 7 kg since admit and trending down.   - Diuresis with Bumex  - Insulin gtt and prednisolone BID, consider Endocrine consult.   - 6C since 3/10      Discussed with CVTS Fellow   Staff surgeons have been informed of changes through both  verbal and written communication.      Ruel Silveira PA-C  CT surgery    Seen with above and agree with assessment and plan.     Carlotta  An Khan PA-C  Cardiothoracic Surgery  Pager [phone]  March 12, 2018

## 2018-03-12 NOTE — PLAN OF CARE
Problem: Patient Care Overview  Goal: Plan of Care/Patient Progress Review  Discharge Planner PT   Patient plan for discharge: not formally discussed with pt this session  Current status: Pt transfers supine<>sit with modA at B LEs, modA at trunk. Educated pt and  on safe log rolling technique for getting into/out of bed. Pt uses lung pillow for all transfers in order to maintain sternal precautions. Pt transfers sit<>stand CGA-Wally x6 reps, verbal cues needed for hip extension once standing. Pt stands for 30 seconds- 1 minute. SaO2 >92% on room air after standing. Pt is able to march x 10 reps and also completes three steps to the R with FWW CGA in order to sit higher in bed. Pt has less pain and improved participation/activity tolerance in PT today.   Barriers to return to prior living situation: impaired LE and proximal strength, functional mobility, and activity tolerance.   Recommendations for discharge: ARU  Rationale for recommendations: pt would benefit from continued intense rehab in order to return to PLOF and improve on the impairments listed above.        Entered by: Vibha Bang 03/12/2018 3:47 PM

## 2018-03-12 NOTE — PLAN OF CARE
Problem: Patient Care Overview  Goal: Plan of Care/Patient Progress Review  Discharge Planner SLP   Patient plan for discharge: Unknown  Current status: Recommend continue dysphagia diet level 3 with thin liquids. Pt to be seated upright, take small bites/sips, and pace self with PO intake. Pt reports that dysgeusia and limited appetite are limited her PO intake.  Barriers to return to prior living situation: Safety concerns associated with dysphagia, deconditioning  Recommendations for discharge: Inpatient rehab  Rationale for recommendations: Anticipate ongoing need for ST       Entered by: Concha Ray 03/12/2018 9:12 AM

## 2018-03-13 ENCOUNTER — APPOINTMENT (OUTPATIENT)
Dept: OCCUPATIONAL THERAPY | Facility: CLINIC | Age: 58
DRG: 003 | End: 2018-03-13
Attending: INTERNAL MEDICINE
Payer: COMMERCIAL

## 2018-03-13 ENCOUNTER — APPOINTMENT (OUTPATIENT)
Dept: GENERAL RADIOLOGY | Facility: CLINIC | Age: 58
DRG: 003 | End: 2018-03-13
Attending: PHYSICIAN ASSISTANT
Payer: COMMERCIAL

## 2018-03-13 ENCOUNTER — APPOINTMENT (OUTPATIENT)
Dept: PHYSICAL THERAPY | Facility: CLINIC | Age: 58
DRG: 003 | End: 2018-03-13
Attending: INTERNAL MEDICINE
Payer: COMMERCIAL

## 2018-03-13 LAB
ANION GAP SERPL CALCULATED.3IONS-SCNC: 10 MMOL/L (ref 3–14)
BRONCHOSCOPY: NORMAL
BUN SERPL-MCNC: 47 MG/DL (ref 7–30)
CALCIUM SERPL-MCNC: 8.5 MG/DL (ref 8.5–10.1)
CHLORIDE SERPL-SCNC: 105 MMOL/L (ref 94–109)
CO2 SERPL-SCNC: 26 MMOL/L (ref 20–32)
CREAT SERPL-MCNC: 1.43 MG/DL (ref 0.52–1.04)
ERYTHROCYTE [DISTWIDTH] IN BLOOD BY AUTOMATED COUNT: 18.5 % (ref 10–15)
ERYTHROCYTE [DISTWIDTH] IN BLOOD BY AUTOMATED COUNT: 18.9 % (ref 10–15)
GFR SERPL CREATININE-BSD FRML MDRD: 38 ML/MIN/1.7M2
GLUCOSE BLDC GLUCOMTR-MCNC: 101 MG/DL (ref 70–99)
GLUCOSE BLDC GLUCOMTR-MCNC: 107 MG/DL (ref 70–99)
GLUCOSE BLDC GLUCOMTR-MCNC: 121 MG/DL (ref 70–99)
GLUCOSE BLDC GLUCOMTR-MCNC: 124 MG/DL (ref 70–99)
GLUCOSE BLDC GLUCOMTR-MCNC: 138 MG/DL (ref 70–99)
GLUCOSE BLDC GLUCOMTR-MCNC: 194 MG/DL (ref 70–99)
GLUCOSE BLDC GLUCOMTR-MCNC: 216 MG/DL (ref 70–99)
GLUCOSE BLDC GLUCOMTR-MCNC: 81 MG/DL (ref 70–99)
GLUCOSE BLDC GLUCOMTR-MCNC: 92 MG/DL (ref 70–99)
GLUCOSE BLDC GLUCOMTR-MCNC: 94 MG/DL (ref 70–99)
GLUCOSE SERPL-MCNC: 194 MG/DL (ref 70–99)
HCT VFR BLD AUTO: 24.9 % (ref 35–47)
HCT VFR BLD AUTO: 27.3 % (ref 35–47)
HGB BLD-MCNC: 7.9 G/DL (ref 11.7–15.7)
HGB BLD-MCNC: 8.6 G/DL (ref 11.7–15.7)
INR PPP: 0.99 (ref 0.86–1.14)
LMWH PPP CHRO-ACNC: <0.1 IU/ML
LMWH PPP CHRO-ACNC: NORMAL IU/ML
MAGNESIUM SERPL-MCNC: 2.1 MG/DL (ref 1.6–2.3)
MCH RBC QN AUTO: 30.2 PG (ref 26.5–33)
MCH RBC QN AUTO: 30.5 PG (ref 26.5–33)
MCHC RBC AUTO-ENTMCNC: 31.5 G/DL (ref 31.5–36.5)
MCHC RBC AUTO-ENTMCNC: 31.7 G/DL (ref 31.5–36.5)
MCV RBC AUTO: 95 FL (ref 78–100)
MCV RBC AUTO: 97 FL (ref 78–100)
PHOSPHATE SERPL-MCNC: 2.4 MG/DL (ref 2.5–4.5)
PLATELET # BLD AUTO: 243 10E9/L (ref 150–450)
PLATELET # BLD AUTO: 260 10E9/L (ref 150–450)
POTASSIUM SERPL-SCNC: 4.1 MMOL/L (ref 3.4–5.3)
RBC # BLD AUTO: 2.62 10E12/L (ref 3.8–5.2)
RBC # BLD AUTO: 2.82 10E12/L (ref 3.8–5.2)
SODIUM SERPL-SCNC: 140 MMOL/L (ref 133–144)
TACROLIMUS BLD-MCNC: 9.6 UG/L (ref 5–15)
TME LAST DOSE: NORMAL H
WBC # BLD AUTO: 15.8 10E9/L (ref 4–11)
WBC # BLD AUTO: 17.3 10E9/L (ref 4–11)

## 2018-03-13 PROCEDURE — 40000133 ZZH STATISTIC OT WARD VISIT

## 2018-03-13 PROCEDURE — 31622 DX BRONCHOSCOPE/WASH: CPT | Performed by: INTERNAL MEDICINE

## 2018-03-13 PROCEDURE — 97535 SELF CARE MNGMENT TRAINING: CPT | Mod: GO

## 2018-03-13 PROCEDURE — 25000132 ZZH RX MED GY IP 250 OP 250 PS 637: Performed by: PHYSICIAN ASSISTANT

## 2018-03-13 PROCEDURE — 27210429 ZZH NUTRITION PRODUCT INTERMEDIATE LITER

## 2018-03-13 PROCEDURE — 84100 ASSAY OF PHOSPHORUS: CPT | Performed by: INTERNAL MEDICINE

## 2018-03-13 PROCEDURE — 25000132 ZZH RX MED GY IP 250 OP 250 PS 637: Performed by: THORACIC SURGERY (CARDIOTHORACIC VASCULAR SURGERY)

## 2018-03-13 PROCEDURE — 25000125 ZZHC RX 250: Performed by: SURGERY

## 2018-03-13 PROCEDURE — 85027 COMPLETE CBC AUTOMATED: CPT | Performed by: SURGERY

## 2018-03-13 PROCEDURE — 25000132 ZZH RX MED GY IP 250 OP 250 PS 637: Performed by: SURGERY

## 2018-03-13 PROCEDURE — 25000131 ZZH RX MED GY IP 250 OP 636 PS 637: Performed by: INTERNAL MEDICINE

## 2018-03-13 PROCEDURE — 85027 COMPLETE CBC AUTOMATED: CPT | Performed by: INTERNAL MEDICINE

## 2018-03-13 PROCEDURE — 71045 X-RAY EXAM CHEST 1 VIEW: CPT

## 2018-03-13 PROCEDURE — 25000131 ZZH RX MED GY IP 250 OP 636 PS 637: Performed by: PHYSICIAN ASSISTANT

## 2018-03-13 PROCEDURE — 85520 HEPARIN ASSAY: CPT | Performed by: SURGERY

## 2018-03-13 PROCEDURE — 85610 PROTHROMBIN TIME: CPT | Performed by: INTERNAL MEDICINE

## 2018-03-13 PROCEDURE — 25000132 ZZH RX MED GY IP 250 OP 250 PS 637: Performed by: ANESTHESIOLOGY

## 2018-03-13 PROCEDURE — 25000128 H RX IP 250 OP 636: Performed by: INTERNAL MEDICINE

## 2018-03-13 PROCEDURE — 25000128 H RX IP 250 OP 636: Performed by: SURGERY

## 2018-03-13 PROCEDURE — 36415 COLL VENOUS BLD VENIPUNCTURE: CPT | Performed by: INTERNAL MEDICINE

## 2018-03-13 PROCEDURE — 80048 BASIC METABOLIC PNL TOTAL CA: CPT | Performed by: INTERNAL MEDICINE

## 2018-03-13 PROCEDURE — 97530 THERAPEUTIC ACTIVITIES: CPT | Mod: GO

## 2018-03-13 PROCEDURE — 00000146 ZZHCL STATISTIC GLUCOSE BY METER IP

## 2018-03-13 PROCEDURE — 25000125 ZZHC RX 250: Performed by: DERMATOLOGY

## 2018-03-13 PROCEDURE — 80197 ASSAY OF TACROLIMUS: CPT | Performed by: STUDENT IN AN ORGANIZED HEALTH CARE EDUCATION/TRAINING PROGRAM

## 2018-03-13 PROCEDURE — 97110 THERAPEUTIC EXERCISES: CPT | Mod: GO

## 2018-03-13 PROCEDURE — 36415 COLL VENOUS BLD VENIPUNCTURE: CPT | Performed by: SURGERY

## 2018-03-13 PROCEDURE — 25000125 ZZHC RX 250: Performed by: INTERNAL MEDICINE

## 2018-03-13 PROCEDURE — 71045 X-RAY EXAM CHEST 1 VIEW: CPT | Mod: 76

## 2018-03-13 PROCEDURE — 25000131 ZZH RX MED GY IP 250 OP 636 PS 637: Performed by: THORACIC SURGERY (CARDIOTHORACIC VASCULAR SURGERY)

## 2018-03-13 PROCEDURE — 97530 THERAPEUTIC ACTIVITIES: CPT | Mod: GP | Performed by: REHABILITATION PRACTITIONER

## 2018-03-13 PROCEDURE — 25800025 ZZH RX 258: Performed by: PHYSICIAN ASSISTANT

## 2018-03-13 PROCEDURE — 99152 MOD SED SAME PHYS/QHP 5/>YRS: CPT | Performed by: INTERNAL MEDICINE

## 2018-03-13 PROCEDURE — 12000006 ZZH R&B IMCU INTERMEDIATE UMMC

## 2018-03-13 PROCEDURE — 83735 ASSAY OF MAGNESIUM: CPT | Performed by: INTERNAL MEDICINE

## 2018-03-13 PROCEDURE — 40000193 ZZH STATISTIC PT WARD VISIT: Performed by: REHABILITATION PRACTITIONER

## 2018-03-13 RX ORDER — WARFARIN SODIUM 4 MG/1
4 TABLET ORAL
Status: COMPLETED | OUTPATIENT
Start: 2018-03-13 | End: 2018-03-13

## 2018-03-13 RX ORDER — ALBUTEROL SULFATE 0.83 MG/ML
SOLUTION RESPIRATORY (INHALATION) PRN
Status: DISCONTINUED | OUTPATIENT
Start: 2018-03-13 | End: 2018-03-13 | Stop reason: HOSPADM

## 2018-03-13 RX ORDER — LIDOCAINE HYDROCHLORIDE 40 MG/ML
INJECTION, SOLUTION RETROBULBAR PRN
Status: DISCONTINUED | OUTPATIENT
Start: 2018-03-13 | End: 2018-03-13 | Stop reason: HOSPADM

## 2018-03-13 RX ORDER — FENTANYL CITRATE 50 UG/ML
INJECTION, SOLUTION INTRAMUSCULAR; INTRAVENOUS PRN
Status: DISCONTINUED | OUTPATIENT
Start: 2018-03-13 | End: 2018-03-13 | Stop reason: HOSPADM

## 2018-03-13 RX ORDER — BUMETANIDE 0.25 MG/ML
1 INJECTION INTRAMUSCULAR; INTRAVENOUS DAILY
Status: DISCONTINUED | OUTPATIENT
Start: 2018-03-14 | End: 2018-03-14

## 2018-03-13 RX ORDER — HEPARIN SODIUM 10000 [USP'U]/100ML
0-3500 INJECTION, SOLUTION INTRAVENOUS CONTINUOUS
Status: DISCONTINUED | OUTPATIENT
Start: 2018-03-13 | End: 2018-03-16

## 2018-03-13 RX ADMIN — Medication 3 MG: at 18:17

## 2018-03-13 RX ADMIN — LEVALBUTEROL TARTRATE 2 PUFF: 45 AEROSOL, METERED ORAL at 23:20

## 2018-03-13 RX ADMIN — PIPERACILLIN SODIUM AND TAZOBACTAM SODIUM 3.38 G: 3; .375 INJECTION, POWDER, LYOPHILIZED, FOR SOLUTION INTRAVENOUS at 17:14

## 2018-03-13 RX ADMIN — PREDNISOLONE SODIUM PHOSPHATE 12.9 MG: 15 SOLUTION ORAL at 19:53

## 2018-03-13 RX ADMIN — NYSTATIN 1000000 UNITS: 500000 SUSPENSION ORAL at 13:02

## 2018-03-13 RX ADMIN — NYSTATIN 1000000 UNITS: 500000 SUSPENSION ORAL at 19:53

## 2018-03-13 RX ADMIN — HEPARIN SODIUM 600 UNITS/HR: 10000 INJECTION, SOLUTION INTRAVENOUS at 11:29

## 2018-03-13 RX ADMIN — INSULIN ASPART 1 UNITS: 100 INJECTION, SOLUTION INTRAVENOUS; SUBCUTANEOUS at 13:03

## 2018-03-13 RX ADMIN — PANTOPRAZOLE SODIUM 40 MG: 40 TABLET, DELAYED RELEASE ORAL at 09:49

## 2018-03-13 RX ADMIN — SILDENAFIL CITRATE 10 MG: 10 POWDER, FOR SUSPENSION ORAL at 04:27

## 2018-03-13 RX ADMIN — LEVALBUTEROL TARTRATE 2 PUFF: 45 AEROSOL, METERED ORAL at 04:26

## 2018-03-13 RX ADMIN — VALGANCICLOVIR HYDROCHLORIDE 450 MG: 50 POWDER, FOR SOLUTION ORAL at 09:49

## 2018-03-13 RX ADMIN — DEXTROSE MONOHYDRATE: 100 INJECTION, SOLUTION INTRAVENOUS at 07:43

## 2018-03-13 RX ADMIN — SODIUM CHLORIDE 1000 ML: 9 INJECTION, SOLUTION INTRAVENOUS at 19:54

## 2018-03-13 RX ADMIN — MYCOPHENOLATE MOFETIL 1500 MG: 200 POWDER, FOR SUSPENSION ORAL at 09:48

## 2018-03-13 RX ADMIN — MULTIPLE VITAMINS W/ MINERALS TAB 1 TABLET: TAB at 09:47

## 2018-03-13 RX ADMIN — QUETIAPINE 50 MG: 50 TABLET ORAL at 22:36

## 2018-03-13 RX ADMIN — LEVALBUTEROL TARTRATE 2 PUFF: 45 AEROSOL, METERED ORAL at 00:46

## 2018-03-13 RX ADMIN — Medication 3 MG: at 09:48

## 2018-03-13 RX ADMIN — SILDENAFIL CITRATE 10 MG: 10 POWDER, FOR SUSPENSION ORAL at 19:53

## 2018-03-13 RX ADMIN — INSULIN ASPART 1 UNITS: 100 INJECTION, SOLUTION INTRAVENOUS; SUBCUTANEOUS at 18:27

## 2018-03-13 RX ADMIN — MYCOPHENOLATE MOFETIL 1500 MG: 200 POWDER, FOR SUSPENSION ORAL at 18:16

## 2018-03-13 RX ADMIN — SULFAMETHOXAZOLE AND TRIMETHOPRIM 1 TABLET: 400; 80 TABLET ORAL at 09:47

## 2018-03-13 RX ADMIN — PIPERACILLIN SODIUM AND TAZOBACTAM SODIUM 3.38 G: 3; .375 INJECTION, POWDER, LYOPHILIZED, FOR SOLUTION INTRAVENOUS at 04:26

## 2018-03-13 RX ADMIN — PIPERACILLIN SODIUM AND TAZOBACTAM SODIUM 3.38 G: 3; .375 INJECTION, POWDER, LYOPHILIZED, FOR SOLUTION INTRAVENOUS at 22:36

## 2018-03-13 RX ADMIN — POTASSIUM PHOSPHATE, MONOBASIC AND POTASSIUM PHOSPHATE, DIBASIC 15 MMOL: 224; 236 INJECTION, SOLUTION INTRAVENOUS at 19:06

## 2018-03-13 RX ADMIN — LEVALBUTEROL TARTRATE 2 PUFF: 45 AEROSOL, METERED ORAL at 17:14

## 2018-03-13 RX ADMIN — NYSTATIN 1000000 UNITS: 500000 SUSPENSION ORAL at 17:14

## 2018-03-13 RX ADMIN — METOPROLOL TARTRATE 25 MG: 25 TABLET, FILM COATED ORAL at 23:25

## 2018-03-13 RX ADMIN — METOPROLOL TARTRATE 25 MG: 25 TABLET, FILM COATED ORAL at 00:45

## 2018-03-13 RX ADMIN — BUMETANIDE 1 MG: 0.25 INJECTION INTRAMUSCULAR; INTRAVENOUS at 04:27

## 2018-03-13 RX ADMIN — LEVALBUTEROL TARTRATE 2 PUFF: 45 AEROSOL, METERED ORAL at 13:02

## 2018-03-13 RX ADMIN — PIPERACILLIN SODIUM AND TAZOBACTAM SODIUM 3.38 G: 3; .375 INJECTION, POWDER, LYOPHILIZED, FOR SOLUTION INTRAVENOUS at 10:00

## 2018-03-13 RX ADMIN — NYSTATIN 1000000 UNITS: 500000 SUSPENSION ORAL at 09:50

## 2018-03-13 RX ADMIN — SILDENAFIL CITRATE 10 MG: 10 POWDER, FOR SUSPENSION ORAL at 12:56

## 2018-03-13 RX ADMIN — WARFARIN SODIUM 4 MG: 4 TABLET ORAL at 18:17

## 2018-03-13 RX ADMIN — LEVALBUTEROL TARTRATE 2 PUFF: 45 AEROSOL, METERED ORAL at 19:53

## 2018-03-13 RX ADMIN — METOPROLOL TARTRATE 25 MG: 25 TABLET, FILM COATED ORAL at 12:57

## 2018-03-13 RX ADMIN — INSULIN HUMAN 6 UNITS: 100 INJECTION, SUSPENSION SUBCUTANEOUS at 09:51

## 2018-03-13 RX ADMIN — PREDNISOLONE SODIUM PHOSPHATE 12.9 MG: 15 SOLUTION ORAL at 09:48

## 2018-03-13 ASSESSMENT — ACTIVITIES OF DAILY LIVING (ADL)
ADLS_ACUITY_SCORE: 16
ADLS_ACUITY_SCORE: 16
ADLS_ACUITY_SCORE: 15
ADLS_ACUITY_SCORE: 16
ADLS_ACUITY_SCORE: 15
ADLS_ACUITY_SCORE: 16

## 2018-03-13 NOTE — PROGRESS NOTES
Lab contacted writer about pt's high heparin 10a level. Lab mariangel from upper extremity that has heparin running. Order put in for re-draw.

## 2018-03-13 NOTE — PLAN OF CARE
Problem: Patient Care Overview  Goal: Plan of Care/Patient Progress Review  Discharge Planner PT   Patient plan for discharge: not formally discussed with pt  Current status: Pt reports she is very fatigued, agrees to shortened session. Pt transfers sit<>stand from chair CGA/Wally x2 reps. Pt completes standing marching x 10 reps B x 2 sets. Pt completes a stand pivot transfer from chair>EOB CGA with FWW. Pt transfers sit>supine modA at B LEs. MaxA x2 to boost in bed. While changing brief, pt demonstrates bridging while in bed. Pt agreeable to try walking in halls tomorrow.   Barriers to return to prior living situation: impaired functional mobility, activity tolerance, LE strength  Recommendations for discharge: TCU   Rationale for recommendations: pt would benefit from continued skilled rehab in order to improve the above listed impairments.        Entered by: Vibha Bang 03/13/2018 3:18 PM

## 2018-03-13 NOTE — PLAN OF CARE
Problem: Patient Care Overview  Goal: Discharge Needs Assessment  03/13/18 6119     Debra Stahl-Registered Nurse (Nursing)  Patient and spouse completed warfarin class at bedside.

## 2018-03-13 NOTE — PROGRESS NOTES
Pulmonary Medicine  Cystic Fibrosis - Lung Transplant Daily Progress Note   March 13, 2018       Patient: Kecia Blue  MRN: [id number]  Transplant Date: 2/21/2018 (POD# 12)  Admission date: 2/21/2018  Hospital Day #20          Assessment and Plan:     Kecia Blue is a 58 y/o female w/PMHx significant for dermatomyositis (on immunosuppression), seronegative RA, ILD, and pulmonary hypertension who was admitted for emergent lung transplant w/u on 2/21 for increased SOB and hypoxia. Required V-A ECMO for right heart failure on 2/27. Now s/p bilateral sequential lung transplant on 3/1, ECMO decannulation on 3/3. Post-op course c/b hemodynamic instability requiring pressors and iFlolan (off, but now on PO Revatio). Extubated 3/7, sating well on RA. Making gradual progress overall.        S/p bilateral lung transplant on 3/1/18 for acute hypoxic/hypercapneic respiratory failure 2/2 ILD.  Post-op Pulm HTN 2/2 ILD.   Adequate graft function. Weaned off iFlolan, however still on PO Revatio. Extubated 3/6, sating well on RA. Minimal pulmonary symptoms. Last chest tube removed earlier today.   - Surveillance bronchoscopy earlier this morning revealing bilateral anastomoses intact and patent, thick yellow secretions around right anastomosis, suctioned and removed. Secretions not collected.   - Antibiotics as noted below  - Diuretics as noted below  - Continue aggressive pulmonary toilet with acapella/incentive spirometry q 1hr while awake. Reinforced ongoing need with patient during my visit.                      Continue 3-drug immunosuppression:  - Tacrolimus 3mg BID. Target goal 10-12 (decresaed for RADHA). Tacrolimus drug level pending. However, not at steady-state. Will plan to repeat level tomorrow (ordered).   - MMF 1500 g BID  - Prednisolone 12.9mg BID. Will work on steroid taper.       Prophylaxis:   PJP: Bactrim  CMV: vangancyclovir  Fungal: Nystatin        Donor Recipient Intervention   CMV status  Pos    Pos  Valgancyclovir POD#8   EBV status Pos Pos Immune    HSV status N/A  Pos Immune       Infectious disease.  SIRS, resolved.  No known infectious disease history. Post-operative SIRS likely a response from multiple procedures (VA ECMO cannulation and decannulation) and transplant surgery. Has been afebrile for some time. Negative recipient bronch culture from time of transplant. Pan cultured 3/4 NGTD.   - Don[facility] at [facility] grew MRSA -- continue vancomycin through tomorrow, which will be 2-week course  - Donor culture at OSH grew MRSA, Strep pneumoniae, Moraxella catarrhalis, Haemophilus species. Continue pip/tazo through tomorrow, which will be 2-week course.      Small-mod L pleural effusion, likely hemothorax.  Pt required 1 unit pRBCs on 3/8. Will continue to monitor with imaging. If worsening, could consider VATS versus chest tube placement w/ lytic therapy.         Steroid-induced hyperglycemia.  Transferred out of ICU on insulin drip. Transitioned to intermittent SQ insulin. Glucose relatively well controlled.   - Appreciate Endocrine's assistance managing glucose / insulin needs  - Monitor glucose ACHS  - Hypoglycemia protocol     Dysphagia.  Noted post-transplant. Working with SLP.   - Currently on Dysphagia diet level 3 with thin liquids   - Appreciate SLP assistance    Diarrhea.  Large bowel loops on imaging.  Pain on defecation.  Diarrhea slowly improving, likely 2/2 antibiotics, immunosuppression agents and/or TFs. Pain while defecation could be anal fissure versus hemorrhoid. C diff negative.   - Desitin cream daily BID prn  - Imodium prn     LUE DVT, provoked.  Found on UE ultrasound on 3/7. Initiated heparin gtt as bridge with warfarin added on 3/12.  - Restart heparin gtt at Low Intensity (off from this AM's bronch)  - Warfarin, pharm to dose  - INR goal 2-3. Will need 3-months of anticoagulation.      RADHA.  Suspect secondary to meds and diuresis. Improving nicely.  - Decrease Bumex IV to  daily. Further assess need tomorrow.   - Trend BMP daily  - Avoid nephrotoxic agents as possible  - Decrease tacro goal 10-12       FEN: Dysphagia diet level 3 with thin liquids and TF  PPX: Heparin gtt, warfarin, PPI   CODE: Full  DISPO: Inpatient cares, likely discharge to TCU in next ~ 3-4 days. PM&R consult placed today for TCU versus ARU.       Patient discussed with Dr. Figueroa.    Vibha Albert, APRN, CNP  Inpatient Nurse Practitioner  [facility]  Pager [phone]        Subjective & Interval History:     Last 24 hour vital signs, labs and care team notes reviewed.    No acute events overnight. Surveillance bronchoscopy this morning, tolerated well. Endorsing minimal cough with rare sputum production. Occasional shortness of breath with activities. Sating well on RA. Denies incisional pain. Motivated to work with PT/OT.             Review of Systems:     C: no fever, no chills, no change in weight, + decrease appetite  INTEGUMENTARY/SKIN: no rash or obvious new lesions  ENT/MOUTH: no sore throat, no sinus pain, no nasal drainage  RESP: see interval history  CV: no chest pain, no palpitations, + peripheral edema, no orthopnea  GI: no nausea, no vomiting, + stools, no reflux symptoms  : no dysuria  MUSCULOSKELETAL: no myalgias, no arthralgias  ENDOCRINE: blood sugars with adequate control  NEURO: no headache, no numbness or tingling  PSYCHIATRIC: mood stable          Medications:     Active Medications:    [START ON 3/14/2018] bumetanide  1 mg Intravenous Daily     insulin isophane human  35 Units Subcutaneous Q24H     insulin aspart  1-12 Units Subcutaneous TID     insulin aspart  1-7 Units Subcutaneous BID AC     piperacillin-tazobactam  3.375 g Intravenous Q6H     valGANciclovir  450 mg Oral or Feeding Tube Once per day on Tue Thu Sat     insulin aspart   Subcutaneous TID w/meals     tacrolimus  3 mg Oral BID IS     vancomycin (VANCOCIN) IV  1,250 mg Intravenous Q36H     sulfamethoxazole-trimethoprim  1  tablet Oral or NG Tube Daily     multivitamin, therapeutic with minerals  1 tablet Oral or Feeding Tube Daily     metoprolol tartrate  25 mg Oral BID     pantoprazole  40 mg Oral or Feeding Tube Daily     QUEtiapine  50 mg Oral At Bedtime     levalbuterol  2 puff Inhalation Q4H JULES     sildenafil  10 mg Oral or Feeding Tube Q8H     nystatin  1,000,000 Units Swish & Swallow 4x Daily     mycophenolate  1,500 mg Oral BID IS    Or     mycophenolate  1,500 mg Oral or NG Tube BID IS     prednisoLONE  0.25 mg/kg (Dosing Weight) Oral or NG Tube BID     Active PRN Medications:  heparin, Warfarin Therapy Reminder, simethicone, HOLD MEDICATION, zinc oxide, insulin aspart, sennosides, potassium phosphate (KPHOS) in D5W IV, potassium phosphate (KPHOS) in D5W IV, potassium phosphate (KPHOS) in D5W IV, potassium phosphate (KPHOS) in D5W IV, potassium phosphate (KPHOS) in D5W IV, artificial tears, metoprolol, IV fluid REPLACEMENT ONLY, naloxone, acetaminophen, oxyCODONE IR, ondansetron **OR** ondansetron, IV fluid REPLACEMENT ONLY, glucose **OR** dextrose **OR** glucagon, potassium chloride, potassium chloride, potassium chloride, potassium chloride with lidocaine, potassium chloride, magnesium sulfate, magnesium sulfate         Physical Exam:     Constitutional: Awake, alert, in no apparent distress.   HEENT: Eyes with pink conjunctivae, no scleral jaundice. Oral mucosa moist without lesions. Neck supple without lymphadenopathy.   PULM: Reduced air flow bilaterally. Clear lung sounds posteriorly. No crackles, no rhonchi, no wheezes.   CV: Normal S1 and S2. RRR. No murmur, gallop, or rub. Trace BLE edema, compression stocking in place. Peripheral pulses intact.   ABD: NABS, soft, nontender, nondistended. No guarding.   MSK: Moves all extremities. No apparent muscle wasting.   NEURO: Alert and oriented x 4, conversant.   SKIN: Warm, dry. No pruritus. No rash on limited exam.   PSYCH: Mood stable, judgment and insight appropriate.?  "    Lines, Drains, and Devices:  Peripheral IV 03/07/18 Right;Anterior Upper forearm (Active)   Site Assessment [facility] 3/13/2018  4:00 AM   Line Status Infusing 3/13/2018  4:00 AM   Phlebitis Scale 0-->no symptoms 3/13/2018  4:00 AM   Infiltration Scale 0 3/13/2018  4:00 AM   Infiltration Site Treatment Method  None 3/13/2018  4:00 AM   Extravasation? No 3/13/2018  4:00 AM   Number of days:6       Peripheral IV 03/08/18 Right Hand (Active)   Site Assessment [facility] 3/13/2018  4:00 AM   Line Status Infusing 3/13/2018  4:00 AM   Phlebitis Scale 0-->no symptoms 3/13/2018  4:00 AM   Infiltration Scale 0 3/13/2018  4:00 AM   Infiltration Site Treatment Method  None 3/13/2018  4:00 AM   Extravasation? No 3/13/2018  4:00 AM   Number of days:5       Peripheral IV 03/10/18 Right Upper forearm (Active)   Site Assessment [facility] 3/13/2018  4:00 AM   Line Status Saline locked 3/13/2018  4:00 AM   Phlebitis Scale 0-->no symptoms 3/13/2018  4:00 AM   Infiltration Scale 0 3/13/2018  4:00 AM   Infiltration Site Treatment Method  None 3/13/2018  4:00 AM   Extravasation? No 3/13/2018  4:00 AM   Number of days:3            Data:     All vital signs, laboratory and imaging data for the past 24 hours reviewed.      Vital signs:  Temp: 97.8  F (36.6  C) Temp src: Oral BP: 107/83 Pulse: 112 Heart Rate: 109 Resp: 22 SpO2: 91 % O2 Device: None (Room air) Oxygen Delivery: 3 LPM Height: 160 cm (5' 3\") Weight: 60 kg (132 lb 4.4 oz)    Weight trend:   Vitals:    03/11/18 0454 03/12/18 0400 03/13/18 0526   Weight: 58.9 kg (129 lb 12.8 oz) 59.1 kg (130 lb 4.7 oz) 60 kg (132 lb 4.4 oz)        I/O:   Intake/Output Summary (Last 24 hours) at 03/13/18 1316  Last data filed at 03/13/18 0930   Gross per 24 hour   Intake             1250 ml   Output             1532 ml   Net             -282 ml       Labs    CMP:   Recent Labs  Lab 03/13/18  0455 03/12/18  0633 03/11/18  0555 03/10/18  0330 03/09/18  0430 03/08/18  0403  03/07/18  0354    143 140 142 138 142 "  --  146*   POTASSIUM 4.1 4.0 3.6 3.6 3.5 3.9  < > 3.8   CHLORIDE 105 105 102 104 102 107  --  112*   CO2 26 26 29 26 27 24  --  25   ANIONGAP 10 12 9 12 10 11  --  9   * 184* 138* 132* 127* 147*  --  169*   BUN 47* 48* 49* 47* 42* 48*  --  57*   CR 1.43* 1.53* 1.58* 1.51* 1.41* 1.33*  --  1.40*   GFRESTIMATED 38* 35* 34* 36* 39* 41*  --  39*   GFRESTBLACK 46* 43* 41* 43* 47* 50*  --  47*   CHELSEY 8.5 8.4* 8.2* 8.1* 7.8* 6.8*  --  7.7*   MAG 2.1 1.7 2.1 1.7 2.0 2.2  < > 2.5*   PHOS 2.4* 2.8 2.9 3.2 3.5 2.8  < > 2.4*   ALBUMIN  --   --  2.8* 3.3* 3.1* 1.8*  --  2.0*   ALT  --   --   --  22 21 28  --  39   < > = values in this interval not displayed.  CBC:   Recent Labs  Lab 03/13/18  1130 03/13/18  0455 03/12/18  0633 03/11/18  0555   WBC 15.8* 17.3* 14.9* 16.1*   RBC 2.82* 2.62* 2.82* 2.73*   HGB 8.6* 7.9* 8.5* 8.3*   HCT 27.3* 24.9* 27.0* 25.5*   MCV 97 95 96 93   MCH 30.5 30.2 30.1 30.4   MCHC 31.5 31.7 31.5 32.5   RDW 18.9* 18.5* 18.2* 17.5*    243 228 224     INR:   Recent Labs  Lab 03/13/18  0455 03/12/18  1123   INR 0.99 1.07     Glucose:   Recent Labs  Lab 03/13/18  1254 03/13/18  1225 03/13/18  0945 03/13/18  0455 03/13/18  0432 03/13/18  0049 03/12/18  2150  03/12/18  0633  03/11/18  0555  03/10/18  0330  03/09/18  0430  03/08/18  0403   GLC  --   --   --  194*  --   --   --   --  184*  --  138*  --  132*  --  127*  --  147*   * 81 138*  --  194* 107* 94  < >  --   < >  --   < >  --   < >  --   < >  --    < > = values in this interval not displayed.  Blood Gas:   Recent Labs  Lab 03/07/18  0355 03/07/18  0354 03/07/18  0010  03/06/18 2039   PHV 7.43  --  7.42  --  7.37   PCO2V 41  --  42  --  45   PO2V 31  --  32  --  33   HCO3V 28  --  27  --  26   LASHAUN 3.1  --  2.4  --  0.7   O2PER 2L 2L 2L  < > 3L   < > = values in this interval not displayed.  Culture Data   Recent Labs  Lab 03/06/18  1330   CULT Culture negative after 1 week  No growth     Virology Data: Lab Results   Component Value  Date    FLUAH1 Negative 02/27/2018    FLUAH3 Negative 02/27/2018    [id number] Negative 02/27/2018    IFLUB Negative 02/27/2018    RSVA Negative 02/27/2018    RSVB Negative 02/27/2018    PIV1 Negative 02/27/2018    PIV2 Negative 02/27/2018    PIV3 Negative 02/27/2018    HMPV Negative 02/27/2018    HRVS Negative 02/27/2018    ADVBE Negative 02/27/2018    ADVC Negative 02/27/2018     Historical CMV results (last 3 of prior testing):  Lab Results   Component Value Date    CMVQNT Canceled, Test credited (A) 03/01/2018    CMVQNT Canceled, Test credited (A) 03/01/2018     Lab Results   Component Value Date    CMVLOG Canceled, Test credited 03/01/2018    CMVLOG Canceled, Test credited 03/01/2018     Urine Studies  Recent Labs   Lab Test  03/04/18   1425   URINEPH  5.5   NITRITE  Negative   LEUKEST  Negative   WBCU  5

## 2018-03-13 NOTE — PLAN OF CARE
Problem: Patient Care Overview  Goal: Plan of Care/Patient Progress Review  Outcome: No Change  Neuro: A&O x 4; Lethargic.   Cardiac: ST/SR. VSS. Afebrile.   Respiratory: RA. SOB with exertion. Coarse lung sounds. Occasional, congested cough. Encouraging coughing/deep-breathing.   GI/: Liquid/loose stools. Adequate urine output. Up to bedside commode.   Diet/Appetite: Cycled TF from 6pm - 8am advanced to goal of 70cc/hour at 10pm. DD3 with thin liquids. NPO since 0000 for bronch this AM.   Activity: AST of 2 up to bedside commode.   Pain: Denies.   Skin: Blanchable redness on sacrum/coccyx. Barrier cream applied. Incision sites approximated with liquid bandage and JOSHUA.   LDAs: R CT to -20 suction, minimal output of serosanguinous fluid. R hand PIV with heparin gtt at 550ml/hour. R forearm PIV tko for ABX. R forearm saline locked for BS draws.     Continue with POC. Notify primary team with changes.   Lupe Lentz RN

## 2018-03-13 NOTE — OR NURSING
Bronchoscopy w/o interventions.  Tolerated well, on 4L N/C with VSS.  Fentanyl and versed for comfort.  Report called to floor RN

## 2018-03-13 NOTE — PROGRESS NOTES
CVTS Daily Note  3/13/2018  Attending: Toby Hernandez*    S:   States that pain is being controlled. Denies any hallucinations. Breathing is ok. Working with her breathing exercises. Nothing to eat this am due to bronch. Some flatus. Working with rehab. Denies any popping/clicking in her breast bone. Was able to get some sleep.     Denies F/C/Sweats.  No CP, SOB, or calf pain.      O:   Vitals:    03/12/18 1958 03/13/18 0042 03/13/18 0400 03/13/18 0526   BP: 96/73 100/66 108/80    BP Location: Right arm Right arm Right arm    Pulse: 104      Resp: 20 20 20    Temp: 97.5  F (36.4  C) 97.8  F (36.6  C) 97.5  F (36.4  C)    TempSrc: Oral Axillary Oral    SpO2: 94%  94%    Weight:    60 kg (132 lb 4.4 oz)   Height:         Vitals:    03/11/18 0454 03/12/18 0400 03/13/18 0526   Weight: 58.9 kg (129 lb 12.8 oz) 59.1 kg (130 lb 4.7 oz) 60 kg (132 lb 4.4 oz)     Intake/Output Summary (Last 24 hours) at 03/13/18 0737  Last data filed at 03/13/18 0400   Gross per 24 hour   Intake             1850 ml   Output             1082 ml   Net              768 ml       MAPs: 87-99  Gen: up in chair, comfortable, -O2  CT - R sided chest tube minimal serous output, -leak  CV: RRR, S1S2 normal, no murmurs, rubs, or gallops.  Pulm: CTA  Abd: soft, non-tender, no guarding  Incision: sternal incision D/I    Labs:  BMP    Recent Labs  Lab 03/13/18  0455 03/12/18  0633 03/11/18  0555 03/10/18  0330    143 140 142   POTASSIUM 4.1 4.0 3.6 3.6   CHLORIDE 105 105 102 104   CHELSEY 8.5 8.4* 8.2* 8.1*   CO2 26 26 29 26   BUN 47* 48* 49* 47*   CR 1.43* 1.53* 1.58* 1.51*   * 184* 138* 132*     CBC    Recent Labs  Lab 03/13/18  0455 03/12/18  0633 03/11/18  0555 03/10/18  1318 03/10/18  0330   WBC 17.3* 14.9* 16.1*  --  16.4*   RBC 2.62* 2.82* 2.73*  --  2.55*   HGB 7.9* 8.5* 8.3* 8.6* 7.6*   HCT 24.9* 27.0* 25.5* 26.6* 24.1*   MCV 95 96 93  --  95   MCH 30.2 30.1 30.4  --  29.8   MCHC 31.7 31.5 32.5  --  31.5   RDW 18.5* 18.2* 17.5*   --  17.3*    228 224  --  184     INR    Recent Labs  Lab 03/13/18  0455 03/12/18  1123   INR 0.99 1.07      Hepatic Panel   Lab Results   Component Value Date    AST 34 03/03/2018     Lab Results   Component Value Date    ALT 22 03/10/2018     Lab Results   Component Value Date    ALBUMIN 2.8 03/11/2018     GLUCOSE:     Recent Labs  Lab 03/13/18  0455 03/13/18  0432 03/13/18  0049 03/12/18  2150 03/12/18  1822 03/12/18  1323 03/12/18  0923 03/12/18  0633  03/11/18  0555  03/10/18  0330  03/09/18  0430  03/08/18  0403   *  --   --   --   --   --   --  184*  --  138*  --  132*  --  127*  --  147*   BGM  --  194* 107* 94 142* 145* 141*  --   < >  --   < >  --   < >  --   < >  --    < > = values in this interval not displayed.      Imaging:  CXR - increased pulm congestion       A/P:   Kecia Blue is a 57 year old female with PMH significant for dermatomyositis on immunosuppression, eronegative RA, ILD, and pulmonary hypertension, who was admitted for emergent lung transplant work-up on 2/21 for increasing shortness of breath and hypoxia now s/p emergent [facility] ECMO placement on 2/27/18. Now s/p bilateral lung transplant on 3/1 and [facility] ECMO decannulation on 3/3 with Dr. Hernandez.    In isolation due to MRSA from bronchial culture (lung fluid donor).     Pulm:   - Pulm toilet, IS, activity and deep breathing   - Separate Right and Left pleural tubes 3/11, all chest tubes removed (last on 3/13)  - repeat CXR after CT removal   - WBC 17.3, afebrile, no signs or symptoms of infection   - Creatinine 1.43, good UOP.    - Diuresis with Bumex  - 6C since 3/10       Discussed with CVTS Fellow   Staff surgeons have been informed of changes through both  verbal and written communication.      Ruel Silveira PA-C  Cardiothoracic Surgery    I have seen and examined this patient with the PA on orientation, I agree with the above findings.  Patient discussed with staff.     Keith Smith PA-C  Cardiothoracic  Surgery  Pager [phone]    7:37 AM   March 13, 2018      Patient seen and examined.  Agree with above.  I spent 15 minutes in direct care of this patient today in reviewing labs, imagining, and face-to-face time today.    Jacinto Ackerman MD  [phone]  CV Surgery

## 2018-03-13 NOTE — CONSULTS
[facility]   PM&R CONSULTATION    Consulting Provider: Dr. Toby Hernandez/Vibha Albert NP  Reason for Consult: Assessment of rehabilitation needs  Date of Encounter: 3/13/2018   Date of Admission: 2/21/2018    ASSESSMENT/PLAN:    Ms. Kecia Blue is a 57 year old y/o female who presents with s/p bilateral lung transplantation on 3/1/2018.  Patient will be in need of acute inpatient rehabilitation (likely) when medically stable versus transitional care unit placement (less likely).   Patient would benefit from continued physical therapy, occupational therapy, speech language pathology therapy, nursing management, and physician management for comprehensive medical care when medically stable for further treatment purposes of current medical state at this time.  Physical therapy and occupational therapy would address mobility, transfers, etc. with speech-language pathology therapy working on dysphagia, etc. with nursing management along with physician management to include medication management, monitor skin breakdown, etc.    HPI:    Patient is a 57-year-old female with history of dermatomyositis on immunosuppression, seronegative rheumatoid arthritis, interstitial lung disease, and pulmonary hypertension who was admitted for emergent lung transplantation workup on 2/21/2018 after being admitted to the hospital for increased shortness of breath and hypoxia.  Noted to have been admitted from the catheterization lab and has been requiring increased oxygen levels over the past 1-2 months before admission.  Emergent [facility] ECMO placement completed on 2/27/2018.  Bilateral lung transplantation completed on 3/1/2018.  [facility] ECMO cannulated 3/3/2018.    PREVIOUS LEVEL OF FUNCTION:   Independent at home with activities of daily living.  Using a scooter at times with home O2 before admission    CURRENT FUNCTIONAL STATUS:   PT - transfers supine to sitting with moderate assistance at  bilateral lower extremities.  Transfers sit to standing contact-guard assist/min assistant × 6 repetitions with verbal cues needed for hip extension with standing.  Patient stands for 30 seconds-1 minute.  Able to march × 10 repetitions also includes 3 steps to the right with FWW contact guard assist in order to sit higher in bed.  Recommended ARU placement.    OT - sit to stand with contact-guard assist, standing for approximately 3 times 30 second bouts of the session limited by dizziness primarily with heart rate into the 120s.  Contact-guard assist for transfer from bed to chair taking a few steps.  Recommending ARU vs TCU.    SLP - recommend continuing with dysphagia diet level 3 with thin liquids.  Patient to be seated upright, take small sips/bites, and pace self or oral intake.  Recommending in-patient rehabilitation    LIVING SITUATION/SUPPORT:   Currently lives at home in [place], [place] with .  Family member lives approximately 15 minutes away.  Ramp used to get into the house.  3 level home with bathroom and bedroom on the main level with laundry in the basement with 1 flight of stairs to get to both.  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 No     Social History Narrative     PAST MEDICAL HISTORY:  Past Medical History:   Diagnosis Date     Antisynthetase syndrome (H) 2014     Chronic cough      Dermatomyositis (H)      Dysplasia of cervix, low grade (ESTRADA 1)      ILD (interstitial lung  disease) (H)      Osteopenia      Pulmonary hypertension      Raynaud's disease      Seronegative rheumatoid arthritis (H)      MEDICATIONS:  Current Facility-Administered Medications   Medication     [START ON 3/14/2018] bumetanide (BUMEX) injection 1 mg     heparin  drip 25,000 units in 0.45% NaCl 250 mL (see additional administration details for dose)     heparin bolus from infusion pump     insulin isophane human (HumuLIN N PEN) injection 35 Units     Warfarin Therapy Reminder (Check START DATE - warfarin may be starting in the FUTURE)     simethicone (MYLICON) chewable tablet 80 mg     HOLD MEDICATION     zinc oxide (DESITIN) 40 % ointment     insulin aspart (NovoLOG) inj (RAPID ACTING)     insulin aspart (NovoLOG) inj (RAPID ACTING)     piperacillin-tazobactam (ZOSYN) 3.375 g vial to attach to  mL bag     valGANciclovir (VALCYTE) solution 450 mg     insulin aspart (NovoLOG) inj (RAPID ACTING)     insulin aspart (NovoLOG) inj (RAPID ACTING)     tacrolimus (GENERIC EQUIVALENT) suspension 3 mg     vancomycin (VANCOCIN) 1,250 mg in sodium chloride 0.9 % 250 mL intermittent infusion     sulfamethoxazole-trimethoprim (BACTRIM/SEPTRA) 400-80 MG per tablet 1 tablet     sennosides (SENOKOT) syrup 5 mL     multivitamin, therapeutic with minerals (THERA-VIT-M) tablet 1 tablet     potassium phosphate 10 mmol in D5W 250 mL intermittent infusion     potassium phosphate 15 mmol in D5W 250 mL intermittent infusion     potassium phosphate 20 mmol in D5W 500 mL intermittent infusion     potassium phosphate 20 mmol in D5W 250 mL intermittent infusion     potassium phosphate 25 mmol in D5W 500 mL intermittent infusion     metoprolol tartrate (LOPRESSOR) tablet 25 mg     artificial tears ophthalmic ointment     pantoprazole (PROTONIX) suspension 40 mg     QUEtiapine (SEROquel) tablet 50 mg     metoprolol (LOPRESSOR) injection 5 mg     levalbuterol (XOPENEX HFA) Inhaler 2 puff *BEATRICE*     sildenafil (REVATIO) suspension 10 mg      dextrose 10 % 1,000 mL infusion     nystatin (MYCOSTATIN) suspension 1,000,000 Units     mycophenolate (GENERIC EQUIVALENT) capsule 1,500 mg    Or     mycophenolate (CELLCEPT BRAND) suspension 1,500 mg     prednisoLONE (ORAPRED) 15 mg/5 mL solution 12.9 mg     naloxone (NARCAN) injection 0.1-0.4 mg     acetaminophen (TYLENOL) tablet 650 mg     oxyCODONE IR (ROXICODONE) tablet 5-10 mg     ondansetron (ZOFRAN-ODT) ODT tab 4 mg    Or     ondansetron (ZOFRAN) injection 4 mg     dextrose 10 % 500 mL infusion     glucose 40 % gel 15-30 g    Or     dextrose 50 % injection 25-50 mL    Or     glucagon injection 1 mg     potassium chloride SA (K-DUR/KLOR-CON M) CR tablet 20-40 mEq     potassium chloride (KLOR-CON) Packet 20-40 mEq     potassium chloride 10 mEq in 100 mL sterile water intermittent infusion (premix)     potassium chloride 10 mEq in 100 mL intermittent infusion with 10 mg lidocaine     potassium chloride 20 mEq in 50 mL intermittent infusion     magnesium sulfate 2 g in NS intermittent infusion (PharMEDium or FV Cmpd)     magnesium sulfate 4 g in 100 mL sterile water (premade)     REVIEW OF SYSTEMS:  10 point ROS of systems including Constitutional, Eyes, Respiratory, Cardiovascular, Gastroenterology, Genitourinary, Integumentary, Musculoskeletal, Endocrine, and Psychiatric were all negative except intermittent heat/cold intolerance.    LABORATORY TESTING:  Lab Results   Component Value Date    WBC 15.8 (H) 03/13/2018    HGB 8.6 (L) 03/13/2018    HCT 27.3 (L) 03/13/2018    MCV 97 03/13/2018     03/13/2018     Lab Results   Component Value Date     03/13/2018    POTASSIUM 4.1 03/13/2018    CHLORIDE 105 03/13/2018    CO2 26 03/13/2018     (H) 03/13/2018     Lab Results   Component Value Date    GFRESTIMATED 38 (L) 03/13/2018    GFRESTBLACK 46 (L) 03/13/2018     Lab Results   Component Value Date    AST 34 03/03/2018    ALT 22 03/10/2018    ALKPHOS 27 (L) 03/03/2018    BILITOTAL 1.5 (H)  03/03/2018     Lab Results   Component Value Date    INR 0.99 03/13/2018     Lab Results   Component Value Date    BUN 47 (H) 03/13/2018    CR 1.43 (H) 03/13/2018     EXAMINATION:  Vitals:    03/13/18 0859 03/13/18 0900 03/13/18 0901 03/13/18 1100   BP:  112/80  107/83   BP Location:    Right arm   Pulse:    112   Resp: 19 22 22 22   Temp:    97.8  F (36.6  C)   TempSrc:    Oral   SpO2: 94% 94% 95% 91%   Weight:       Height:         GEN: NAD, seated in chair  Speech is fluent   Comprehension is intact  HEENT: NCAT  RESPIRATORY: crackles present of the left lower lobe  CARDIAC: tachycardic with no murmurs/rubs/gallops  MSK: full active and passive ROM at all major joints of the bilateral upper and lower extremities  ABD: soft, non tender, and hypoactive bowel sounds  NEURO:   CRANIAL NERVES: Cranial nerves 2-12 grossly intact   Sensation: sensation intact bilaterally of the upper/lower extremities   Strength: 5/5 - triceps, knee extension, and knee flexion bilaterally; 4/5 - bilateral shoulder abduction, elbow flexion/extension, finger flexion, hip flexion, and great toe extension   Cognition: fund of knowledge and train of thought appropriate  SKIN: has an NG tube in place.  Multiple healed surgical/line incisions noted of the left neck and chest wall regions  EXT: No edema bilaterally, chronic stasis changes, and no ulcers  PSYCH: normal affect.  Mood is baseline with no signs of depression    Kel Shepherd DO, CAQSM    Department of Rehabilitation Medicine    I spent a total of 80 minutes bedside and on the unit today managing the care of the above patient.  Over 50% of my time on the unit was spent counseling the patient and/or coordinating care.  See note for details.

## 2018-03-13 NOTE — PROGRESS NOTES
Diabetes Consult Daily  Progress Note          Assessment/Plan:     Ms. Kecia Blue is a 57-year-old woman with a history of ILD and pulmonary hypertension, dermatomyositis, RA, and Raynauds syndrome, who is admitted for emergent lung transplant workup on 2/21 for increased shortness of breath and hypoxia, required [facility] ECMO for R heart failure, now s/p bilateral sequential lung transplant on 3/1.    No history of diabetes.  Kecia is experiencing hyperglycemia secondary to enteral feeds and steroids.     Fair glucose control since transition to cycled TF and no hypoglycemia despite early end of TF cycle.    Plan:  -Morning NPH d/c-ed starting tomorrow.  Tonight, increase NPH to 35 units at start of TF cycle  -continue meal aspart 1unit/15g CHO with meals and snacks.  -Correction aspart: high intensity at 1800, 2200, 0400, medium intensity before bfast and before lunch.  -monitor glucoses ac, hs and 0400.       Outpatient diabetes follow up: tbd  Plan discussed with patient, bedside RN           Interval History:   The last 24 hours progress and nursing notes reviewed.  Ccyled Nutren started last night.  Reaching 70 cc/h but stopping at 0400 rather than planned 0800, due to bronchoscopy this morning.  BG angelina to 190s by time of TF stop.    Pt says she tolerated the increased rate fine.  She received D10W starting at 0730, before leaving floor for bronch.    Pt comments on usually having trouble tolerating anesthesia, but had no nausea as of late morning.  She planned to try eating later today.  She was feeling good about tolerance of the ergometer.  Explained impact of exercise on glucose/insulin need.  Prednisone remains 12.9 mg BID.  Taper in the works, per pulm note.        Recent Labs  Lab 03/13/18  1254 03/13/18  1225 03/13/18  0945 03/13/18  0455 03/13/18  0432 03/13/18  0049 03/12/18  2150  03/12/18  0633  03/11/18  0555  03/10/18  0330  03/09/18  0430  03/08/18  0403   GLC  --   --  "  --  194*  --   --   --   --  184*  --  138*  --  132*  --  127*  --  147*   * 81 138*  --  194* 107* 94  < >  --   < >  --   < >  --   < >  --   < >  --    < > = values in this interval not displayed.            Review of Systems:   See interval hx          Medications:       Active Diet Order      Dysphagia Diet Level 3 Advanced Thin Liquids (water, ice chips, juice, milk gelatin, ice cream, etc)     Physical Exam:  Gen: Alert, sitting up in chair using ergometer, in NAD   HEENT: NC/AT, mucous membranes are moist  Resp: Unlabored, speaking pretty comfortably despite pedaling ergo   Neuro:oriented x3, communicating clearly  /74 (BP Location: Right arm)  Pulse 112  Temp 97.8  F (36.6  C) (Oral)  Resp 22  Ht 1.6 m (5' 3\")  Wt 60 kg (132 lb 4.4 oz)  SpO2 92%  BMI 23.43 kg/m2           Data:     Lab Results   Component Value Date    A1C 5.5 03/01/2018    A1C Canceled, Test credited 03/01/2018    A1C 5.3 02/27/2018              Recent Labs   Lab Test  03/13/18   0455  03/12/18   0633   NA  140  143   POTASSIUM  4.1  4.0   CHLORIDE  105  105   CO2  26  26   ANIONGAP  10  12   GLC  194*  184*   BUN  47*  48*   CR  1.43*  1.53*   HCELSEY  8.5  8.4*     CBC RESULTS:   Recent Labs   Lab Test  03/13/18   1130   WBC  15.8*   RBC  2.82*   HGB  8.6*   HCT  27.3*   MCV  97   MCH  30.5   MCHC  31.5   RDW  18.9*   PLT  260     Liver Function Studies -   Recent Labs   Lab Test  03/11/18   0555  03/10/18   0330   03/03/18   0329   PROTTOTAL   --    --    --   4.0*   ALBUMIN  2.8*  3.3*   < >  2.4*   BILITOTAL   --    --    --   1.5*   ALKPHOS   --    --    --   27*   AST   --    --    --   34   ALT   --   22   < >  29    < > = values in this interval not displayed.     Rona Frazierpton APRN [id number]    Diabetes Management job code [id number]          "

## 2018-03-13 NOTE — PROGRESS NOTES
Problem: Patient Care Overview  Goal: Plan of Care/Patient Progress Review  Outcome: Improving      Neuro: A&O x4. BUE and BLE 4/5. PERRLA.   Cardiac: Sinus rhythm/sinus tach. B/P WNL.    Respiratory: Sating in mid to high 90's on RA. LS diminished at bases.   GI/: Voiding adequately. Incontinent of urine x2. Up to BSC. 1 BM.   Diet/appetite: Tolerating diet. On yury counts. Tube feeds started at 1820 at 70cc/hour.    Activity: Up to chair and BSC with 1-2A gaitbelt.    Pain: Denies pain.   Skin: Mid sternal incision CDI. R chest incision CDI. Edema to LUE d/t DVT. 2 chest tube sites- R side with small amount drainage.   LDA's: 3 PIV's to RUE. Running heparin. NJ patent with tube feeds running.      Plan: BG corrected per SS. See previous note about 10a level. Continue with POC.

## 2018-03-14 ENCOUNTER — APPOINTMENT (OUTPATIENT)
Dept: GENERAL RADIOLOGY | Facility: CLINIC | Age: 58
DRG: 003 | End: 2018-03-14
Attending: SURGERY
Payer: COMMERCIAL

## 2018-03-14 ENCOUNTER — APPOINTMENT (OUTPATIENT)
Dept: OCCUPATIONAL THERAPY | Facility: CLINIC | Age: 58
DRG: 003 | End: 2018-03-14
Attending: INTERNAL MEDICINE
Payer: COMMERCIAL

## 2018-03-14 ENCOUNTER — APPOINTMENT (OUTPATIENT)
Dept: GENERAL RADIOLOGY | Facility: CLINIC | Age: 58
DRG: 003 | End: 2018-03-14
Attending: PHYSICIAN ASSISTANT
Payer: COMMERCIAL

## 2018-03-14 LAB
ANION GAP SERPL CALCULATED.3IONS-SCNC: 14 MMOL/L (ref 3–14)
BUN SERPL-MCNC: 44 MG/DL (ref 7–30)
CALCIUM SERPL-MCNC: 8.2 MG/DL (ref 8.5–10.1)
CHLORIDE SERPL-SCNC: 110 MMOL/L (ref 94–109)
CO2 SERPL-SCNC: 18 MMOL/L (ref 20–32)
CREAT SERPL-MCNC: 1.24 MG/DL (ref 0.52–1.04)
ERYTHROCYTE [DISTWIDTH] IN BLOOD BY AUTOMATED COUNT: 19.3 % (ref 10–15)
GFR SERPL CREATININE-BSD FRML MDRD: 45 ML/MIN/1.7M2
GLUCOSE BLDC GLUCOMTR-MCNC: 113 MG/DL (ref 70–99)
GLUCOSE BLDC GLUCOMTR-MCNC: 153 MG/DL (ref 70–99)
GLUCOSE BLDC GLUCOMTR-MCNC: 154 MG/DL (ref 70–99)
GLUCOSE BLDC GLUCOMTR-MCNC: 89 MG/DL (ref 70–99)
GLUCOSE SERPL-MCNC: 152 MG/DL (ref 70–99)
HCT VFR BLD AUTO: 23.9 % (ref 35–47)
HGB BLD-MCNC: 7.3 G/DL (ref 11.7–15.7)
HGB BLD-MCNC: 7.9 G/DL (ref 11.7–15.7)
INR PPP: 1.03 (ref 0.86–1.14)
LMWH PPP CHRO-ACNC: 0.24 IU/ML
LMWH PPP CHRO-ACNC: <0.1 IU/ML
MAGNESIUM SERPL-MCNC: 1.6 MG/DL (ref 1.6–2.3)
MCH RBC QN AUTO: 30.2 PG (ref 26.5–33)
MCHC RBC AUTO-ENTMCNC: 30.5 G/DL (ref 31.5–36.5)
MCV RBC AUTO: 99 FL (ref 78–100)
PHOSPHATE SERPL-MCNC: 3.1 MG/DL (ref 2.5–4.5)
PLATELET # BLD AUTO: 208 10E9/L (ref 150–450)
POTASSIUM SERPL-SCNC: 4.2 MMOL/L (ref 3.4–5.3)
POTASSIUM SERPL-SCNC: 5.4 MMOL/L (ref 3.4–5.3)
RBC # BLD AUTO: 2.42 10E12/L (ref 3.8–5.2)
SODIUM SERPL-SCNC: 142 MMOL/L (ref 133–144)
TACROLIMUS BLD-MCNC: 6.2 UG/L (ref 5–15)
TME LAST DOSE: NORMAL H
WBC # BLD AUTO: 12.3 10E9/L (ref 4–11)

## 2018-03-14 PROCEDURE — 36415 COLL VENOUS BLD VENIPUNCTURE: CPT | Performed by: SURGERY

## 2018-03-14 PROCEDURE — 85027 COMPLETE CBC AUTOMATED: CPT | Performed by: INTERNAL MEDICINE

## 2018-03-14 PROCEDURE — 40000193 ZZH STATISTIC PT WARD VISIT: Performed by: REHABILITATION PRACTITIONER

## 2018-03-14 PROCEDURE — 80197 ASSAY OF TACROLIMUS: CPT | Performed by: SURGERY

## 2018-03-14 PROCEDURE — 97110 THERAPEUTIC EXERCISES: CPT | Mod: GO

## 2018-03-14 PROCEDURE — 85610 PROTHROMBIN TIME: CPT | Performed by: INTERNAL MEDICINE

## 2018-03-14 PROCEDURE — 40000133 ZZH STATISTIC OT WARD VISIT

## 2018-03-14 PROCEDURE — 85018 HEMOGLOBIN: CPT | Performed by: SURGERY

## 2018-03-14 PROCEDURE — 85520 HEPARIN ASSAY: CPT | Performed by: INTERNAL MEDICINE

## 2018-03-14 PROCEDURE — 97535 SELF CARE MNGMENT TRAINING: CPT | Mod: GO

## 2018-03-14 PROCEDURE — 97530 THERAPEUTIC ACTIVITIES: CPT | Mod: GP | Performed by: REHABILITATION PRACTITIONER

## 2018-03-14 PROCEDURE — 36415 COLL VENOUS BLD VENIPUNCTURE: CPT | Performed by: INTERNAL MEDICINE

## 2018-03-14 PROCEDURE — 25000128 H RX IP 250 OP 636: Performed by: SURGERY

## 2018-03-14 PROCEDURE — 25000131 ZZH RX MED GY IP 250 OP 636 PS 637: Performed by: INTERNAL MEDICINE

## 2018-03-14 PROCEDURE — 74018 RADEX ABDOMEN 1 VIEW: CPT

## 2018-03-14 PROCEDURE — 80048 BASIC METABOLIC PNL TOTAL CA: CPT | Performed by: INTERNAL MEDICINE

## 2018-03-14 PROCEDURE — 25000132 ZZH RX MED GY IP 250 OP 250 PS 637: Performed by: SURGERY

## 2018-03-14 PROCEDURE — 12000006 ZZH R&B IMCU INTERMEDIATE UMMC

## 2018-03-14 PROCEDURE — 25000131 ZZH RX MED GY IP 250 OP 636 PS 637: Performed by: SURGERY

## 2018-03-14 PROCEDURE — 25000132 ZZH RX MED GY IP 250 OP 250 PS 637: Performed by: ANESTHESIOLOGY

## 2018-03-14 PROCEDURE — 25000132 ZZH RX MED GY IP 250 OP 250 PS 637: Performed by: PHYSICIAN ASSISTANT

## 2018-03-14 PROCEDURE — 84100 ASSAY OF PHOSPHORUS: CPT | Performed by: INTERNAL MEDICINE

## 2018-03-14 PROCEDURE — 83735 ASSAY OF MAGNESIUM: CPT | Performed by: INTERNAL MEDICINE

## 2018-03-14 PROCEDURE — 97116 GAIT TRAINING THERAPY: CPT | Mod: GP | Performed by: REHABILITATION PRACTITIONER

## 2018-03-14 PROCEDURE — 71045 X-RAY EXAM CHEST 1 VIEW: CPT

## 2018-03-14 PROCEDURE — 25000125 ZZHC RX 250: Performed by: STUDENT IN AN ORGANIZED HEALTH CARE EDUCATION/TRAINING PROGRAM

## 2018-03-14 PROCEDURE — 25000131 ZZH RX MED GY IP 250 OP 636 PS 637: Performed by: THORACIC SURGERY (CARDIOTHORACIC VASCULAR SURGERY)

## 2018-03-14 PROCEDURE — 00000146 ZZHCL STATISTIC GLUCOSE BY METER IP

## 2018-03-14 PROCEDURE — 25000132 ZZH RX MED GY IP 250 OP 250 PS 637: Performed by: THORACIC SURGERY (CARDIOTHORACIC VASCULAR SURGERY)

## 2018-03-14 PROCEDURE — 84132 ASSAY OF SERUM POTASSIUM: CPT | Performed by: SURGERY

## 2018-03-14 RX ORDER — POLYETHYLENE GLYCOL 3350 17 G/17G
17 POWDER, FOR SOLUTION ORAL 2 TIMES DAILY
Status: DISCONTINUED | OUTPATIENT
Start: 2018-03-14 | End: 2018-03-15

## 2018-03-14 RX ORDER — WARFARIN SODIUM 6 MG/1
6 TABLET ORAL
Status: COMPLETED | OUTPATIENT
Start: 2018-03-14 | End: 2018-03-14

## 2018-03-14 RX ADMIN — QUETIAPINE 50 MG: 50 TABLET ORAL at 21:18

## 2018-03-14 RX ADMIN — METOPROLOL TARTRATE 25 MG: 25 TABLET, FILM COATED ORAL at 12:46

## 2018-03-14 RX ADMIN — LEVALBUTEROL TARTRATE 2 PUFF: 45 AEROSOL, METERED ORAL at 08:00

## 2018-03-14 RX ADMIN — NYSTATIN 1000000 UNITS: 500000 SUSPENSION ORAL at 12:47

## 2018-03-14 RX ADMIN — SILDENAFIL CITRATE 10 MG: 10 POWDER, FOR SUSPENSION ORAL at 21:18

## 2018-03-14 RX ADMIN — SULFAMETHOXAZOLE AND TRIMETHOPRIM 1 TABLET: 400; 80 TABLET ORAL at 08:25

## 2018-03-14 RX ADMIN — PANTOPRAZOLE SODIUM 40 MG: 40 TABLET, DELAYED RELEASE ORAL at 08:25

## 2018-03-14 RX ADMIN — SILDENAFIL CITRATE 10 MG: 10 POWDER, FOR SUSPENSION ORAL at 12:47

## 2018-03-14 RX ADMIN — LEVALBUTEROL TARTRATE 2 PUFF: 45 AEROSOL, METERED ORAL at 12:47

## 2018-03-14 RX ADMIN — PREDNISOLONE SODIUM PHOSPHATE 12.9 MG: 15 SOLUTION ORAL at 21:18

## 2018-03-14 RX ADMIN — PIPERACILLIN SODIUM AND TAZOBACTAM SODIUM 3.38 G: 3; .375 INJECTION, POWDER, LYOPHILIZED, FOR SOLUTION INTRAVENOUS at 10:48

## 2018-03-14 RX ADMIN — LEVALBUTEROL TARTRATE 2 PUFF: 45 AEROSOL, METERED ORAL at 16:25

## 2018-03-14 RX ADMIN — WARFARIN SODIUM 6 MG: 6 TABLET ORAL at 17:53

## 2018-03-14 RX ADMIN — MYCOPHENOLATE MOFETIL 1500 MG: 200 POWDER, FOR SUSPENSION ORAL at 08:24

## 2018-03-14 RX ADMIN — MULTIVITAMIN 15 ML: LIQUID ORAL at 21:18

## 2018-03-14 RX ADMIN — Medication 2 G: at 12:47

## 2018-03-14 RX ADMIN — PIPERACILLIN SODIUM AND TAZOBACTAM SODIUM 3.38 G: 3; .375 INJECTION, POWDER, LYOPHILIZED, FOR SOLUTION INTRAVENOUS at 04:46

## 2018-03-14 RX ADMIN — NYSTATIN 1000000 UNITS: 500000 SUSPENSION ORAL at 08:27

## 2018-03-14 RX ADMIN — PIPERACILLIN SODIUM AND TAZOBACTAM SODIUM 3.38 G: 3; .375 INJECTION, POWDER, LYOPHILIZED, FOR SOLUTION INTRAVENOUS at 17:53

## 2018-03-14 RX ADMIN — VANCOMYCIN HYDROCHLORIDE 1250 MG: 10 INJECTION, POWDER, LYOPHILIZED, FOR SOLUTION INTRAVENOUS at 06:17

## 2018-03-14 RX ADMIN — Medication 3 MG: at 08:25

## 2018-03-14 RX ADMIN — SILDENAFIL CITRATE 10 MG: 10 POWDER, FOR SUSPENSION ORAL at 04:46

## 2018-03-14 RX ADMIN — NYSTATIN 1000000 UNITS: 500000 SUSPENSION ORAL at 21:19

## 2018-03-14 RX ADMIN — HEPARIN SODIUM 1200 UNITS/HR: 10000 INJECTION, SOLUTION INTRAVENOUS at 10:53

## 2018-03-14 RX ADMIN — MYCOPHENOLATE MOFETIL 1500 MG: 200 POWDER, FOR SUSPENSION ORAL at 17:53

## 2018-03-14 RX ADMIN — PREDNISOLONE SODIUM PHOSPHATE 12.9 MG: 15 SOLUTION ORAL at 08:25

## 2018-03-14 RX ADMIN — LEVALBUTEROL TARTRATE 2 PUFF: 45 AEROSOL, METERED ORAL at 21:18

## 2018-03-14 RX ADMIN — Medication 4 MG: at 17:53

## 2018-03-14 RX ADMIN — INSULIN ASPART 3 UNITS: 100 INJECTION, SOLUTION INTRAVENOUS; SUBCUTANEOUS at 20:27

## 2018-03-14 RX ADMIN — LEVALBUTEROL TARTRATE 2 PUFF: 45 AEROSOL, METERED ORAL at 04:46

## 2018-03-14 ASSESSMENT — ACTIVITIES OF DAILY LIVING (ADL)
ADLS_ACUITY_SCORE: 16
ADLS_ACUITY_SCORE: 16
ADLS_ACUITY_SCORE: 17

## 2018-03-14 NOTE — PROGRESS NOTES
CLINICAL NUTRITION SERVICES - REASSESSMENT NOTE     Nutrition Prescription    RECOMMENDATIONS FOR MDs/PROVIDERS TO ORDER:  Needs Calcium/vitamin D prior to d/c    Malnutrition Status:    Severe malnutrition in the context of acute on chronic illness    Recommendations already ordered by Registered Dietitian (RD):  None at this time    Future/Additional Recommendations:  Continue to monitor PO intakes and need to adjust cycled TF     EVALUATION OF THE PROGRESS TOWARD GOALS   Diet: DD3 + thin liquids  Nutrition Support: 3/12-___: Nutren 1.5 @ 14 hours as follows (6pm-8am): Nutren 1.5 @ 70 ml/hr x 14 hours, 980ml, 1470 kcals (29), 67 g pro (1.3)  Intake: 0-50% of meals consumed since admit.  Average TF intakes: 786ml ,1178 kcals (24 kcals/kg), 53 g pro (1.1 g/kg pro)  Calorie counts:   3/13: 576 kcals, 37 g pro  -Suspect pt is meeting estimated need with TF and PO    NEW FINDINGS   Weight: Pts wt has been trending up over the past 3 weeks. Pts wt was 113# when admitted and is currently 134#. Pt with mild edema.  Labs: carbon dioxide: 18 (L), BUN: 44 (H-trending down), Cr: 1.24 (L- improving)  Meds: novolog, cellcept   GI: Rectal tube removed 3/11, last BM today x2 (liquid/loose). Diarrhea slowly improving, likely 2/2 antibiotics, immunosuppression agents and/or TFs. Pain while defecation could be anal fissure versus hemorrhoid. C diff negative.   - Desitin cream daily BID prn  - Imodium prn  Skin: Blanchable redness on sacrum/coccyx. Mepilex placed and weight shifting encouraged.    MALNUTRITION  % Intake: No decreased intake noted  % Weight Loss: None noted  Subcutaneous Fat Loss: Facial region, Upper arm, Lower arm and Thoracic/intercostal: moderate  Muscle Loss: Facial & jaw region, Thoracic region (clavicle, acromium bone, deltoid, trapezius, pectoral), Upper arm (bicep, tricep), Lower arm  (forearm) and Posterior calf: moderate- agree with previous RD assessment.  Fluid Accumulation/Edema: Mild  Malnutrition  Diagnosis: Severe malnutrition in the context of acute on chronic illness    Previous Goals   Total avg nutritional intake to meet a minimum of 30 kcal/kg and 1.3 g PRO/kg daily (per dosing wt 50 kg).  Evaluation: Met    Previous Nutrition Diagnosis  Inadequate protein-energy intake related to inadequate enteral nutrition infusion as evidenced by average intakes over past 7 days met 45% kcal and 48% protein needs.    Evaluation: Improving    CURRENT NUTRITION DIAGNOSIS  Inadequate oral intake related to decreased appetite and ongoing satiety from TF as evidenced by pt need for tube feeds at this time as one day yury counts only providing ~500kcals.     INTERVENTIONS  Implementation  Collaboration with other providers- 6B rounds    Goals  Total avg nutritional intake to meet a minimum of 30 kcal/kg and 1.3 g PRO/kg daily (per dosing wt 50 kg).    Monitoring/Evaluation  Progress toward goals will be monitored and evaluated per protocol.      Natalia Gutierrez, AMALIA, MS, LD  6B- Pager: [phone]

## 2018-03-14 NOTE — PROGRESS NOTES
CVTS Daily Note  3/14/2018  Attending: Toby Hernandez*    S:   States that pain is being controlled. Denies any hallucinations. Breathing is ok. Working with her breathing exercises. States that she doesn't do the IS very well and that the acapella works better. Appetite improving. Ate 75-80% of dinner last night. Tube feed stopped this am. Working with rehab. Denies any popping/clicking in her breast bone. Was able to get some sleep. Woke her early this am.    Denies F/C/Sweats.  No CP, SOB, or calf pain.      O:   Vitals:    03/13/18 2000 03/13/18 2300 03/14/18 0417 03/14/18 0754   BP: (!) 78/59 93/71 108/63 105/78   BP Location: Right arm Right arm Right arm Right arm   Pulse:       Resp:  22 24 22   Temp:  97.4  F (36.3  C) 97.6  F (36.4  C) 97.5  F (36.4  C)   TempSrc:  Oral Oral Oral   SpO2: 92% 97% 96% 98%   Weight:   61 kg (134 lb 7.7 oz)    Height:         Vitals:    03/12/18 0400 03/13/18 0526 03/14/18 0417   Weight: 59.1 kg (130 lb 4.7 oz) 60 kg (132 lb 4.4 oz) 61 kg (134 lb 7.7 oz)     Intake/Output Summary (Last 24 hours) at 03/13/18 0737  Last data filed at 03/13/18 0400   Gross per 24 hour   Intake             1850 ml   Output             1082 ml   Net              768 ml       MAPs: 65-93  Gen: lying in bed, comfortable, -O2,  at bedside  CT removed  CV: RRR, S1S2 normal, no murmurs, rubs, or gallops.  Pulm: CTA,  ml  Abd: soft, non-tender, no guarding  Incision: sternal incision D/I    Labs:  BMP    Recent Labs  Lab 03/14/18  0640 03/13/18  0455 03/12/18  0633 03/11/18  0555    140 143 140   POTASSIUM 5.4* 4.1 4.0 3.6   CHLORIDE 110* 105 105 102   CHELSEY 8.2* 8.5 8.4* 8.2*   CO2 18* 26 26 29   BUN 44* 47* 48* 49*   CR 1.24* 1.43* 1.53* 1.58*   * 194* 184* 138*     CBC    Recent Labs  Lab 03/14/18  0640 03/13/18  1130 03/13/18  0455 03/12/18  0633   WBC 12.3* 15.8* 17.3* 14.9*   RBC 2.42* 2.82* 2.62* 2.82*   HGB 7.3* 8.6* 7.9* 8.5*   HCT 23.9* 27.3* 24.9* 27.0*   MCV  99 97 95 96   MCH 30.2 30.5 30.2 30.1   MCHC 30.5* 31.5 31.7 31.5   RDW 19.3* 18.9* 18.5* 18.2*    260 243 228     INR    Recent Labs  Lab 03/14/18  0640 03/13/18  0455 03/12/18  1123   INR 1.03 0.99 1.07      Hepatic Panel   Lab Results   Component Value Date    AST 34 03/03/2018     Lab Results   Component Value Date    ALT 22 03/10/2018     Lab Results   Component Value Date    ALBUMIN 2.8 03/11/2018     GLUCOSE:     Recent Labs  Lab 03/14/18  0640 03/14/18  0439 03/13/18  2233 03/13/18  1922 03/13/18  1815 03/13/18  1548 03/13/18  1254  03/13/18  0455  03/12/18  0633  03/11/18  0555  03/10/18  0330  03/09/18  0430   *  --   --   --   --   --   --   --  194*  --  184*  --  138*  --  132*  --  127*   BGM  --  154* 216* 121* 124* 92 101*  < >  --   < >  --   < >  --   < >  --   < >  --    < > = values in this interval not displayed.      Imaging:  CXR - no obvious pneumothorax after chest tube removal     A/P:   Kecia Blue is a 57 year old female with PMH significant for dermatomyositis on immunosuppression, eronegative RA, ILD, and pulmonary hypertension, who was admitted for emergent lung transplant work-up on 2/21 for increasing shortness of breath and hypoxia now s/p emergent VA ECMO placement on 2/27/18. Now s/p bilateral lung transplant on 3/1 and VA ECMO decannulation on 3/3 with Dr. Hernandez.    In isolation due to MRSA from bronchial culture (lung fluid donor).     Pulm:   - Pulm toilet, IS, activity and deep breathing   - Separate Right and Left pleural tubes 3/11, all chest tubes removed (last on 3/13)  - repeat CXR after CT removal   - Afebrile, no signs or symptoms of infection   - Creatinine stable, good UOP.    - Diuresis with Bumex  - 6C since 3/10     Encouraged IS/TCDB/amb. Sternal precautions. Will sign off. Call with any questions/concerns. Continue to monitor.     Ruel Silveira PA-C  Cardiothoracic Surgery    Keith Smith PA-C  Cardiothoracic Surgery  Pager  [phone]    2:19 PM   March 14, 2018

## 2018-03-14 NOTE — PROGRESS NOTES
"  SPIRITUAL HEALTH SERVICES  SPIRITUAL ASSESSMENT Progress Note  [facility] ([place] 6B     PRIMARY FOCUS:     Goals of care    Emotional/spiritual/Presybeterian distress    Support for coping    REFERRAL SOURCE:  Initiated    ILLNESS CIRCUMSTANCES:   Reviewed documentation. Reflective conversation shared with Kecia and her  which integrated elements of illness and life review     Context of Serious Illness/Symptom(s) - Met with Kecia and her  to offer spiritual and emotional support.  Kecia said she has been on a \"long journey with [her] lung transplant and will probably continue to be here awhile.\"  She said things are \"looking up\" and she is \"grateful.\"     Resources for Support - Kecia feels supported by her \"\" and the practice of \"prayer\"    DISTRESS:     Emotional/Spiritual/Existential Distress - Kecia expressed feelings of \"exhaustion\" and \"annoyance\" for \"not being able to receive much rest.\"  She said \"I am here for awhile and will continue to do rehab probably on the [facility].\"  \"I am grateful for my lungs but I just want to go for a walk in the park outside.\"      Religion Distress - Kecia, her  and I talked extensively about their experiences in their \"old Baptism Mormon\" which really \"messed things up for them.\"  Kecia said \"it's really hard to go to Mormon now not just because of my medical situation.\"  We talked about the ways the \"institution of the Mormon has wronged them and their family\" and how spirituality can exist outside of just a Mormon building or community.  We talked about spirituality as a connection to self, connection to others and a connection to a higher power.  Kecia said \"I really resonate with that.\"     Social/Cultural/Economic Distress - Kecia expressed \"wishing I had a schedule for what is going on when i'm in the hospital.  It's hard to ever know how to plan my day or even go for a walk if I don't know this.\"     SPIRITUAL/Amish COPING: " "    Advent/Alondra - Kecai identified as \"Presybeterian\" but said \"I don't really know what that means anymore.\"     Spiritual Practice(s) - prayer     Emotional/Relational/Existential Connections - Kecia feels connected to God \"through prayer\" and in her \"personal connection.\"     GOALS OF CARE:    Goals of Care - Kecia expressed a desire for \"understanding of [her] day\" while she is here to have some \"control\" and agency.      At the end of our visit Kecia, her  and I prayed prayers of \"thanksgiving for Kecia's lungs\" and for \"God to be with her in recovery.\"     PLAN: No plans to follow up.  SH remains available.     Lesly Grande  Chaplain Resident  Pager [phone]    "

## 2018-03-14 NOTE — PROGRESS NOTES
Pulmonary Medicine  Cystic Fibrosis - Lung Transplant Daily Progress Note   March 14, 2018       Patient: Kecia Blue  MRN: [id number]  Transplant Date: 2/21/2018 (POD# 13)  Admission date: 2/21/2018  Hospital Day #21          Assessment and Plan:     Kecia Blue is a 58 y/o female w/PMHx significant for dermatomyositis (on immunosuppression), seronegative RA, ILD, and pulmonary hypertension who was admitted for emergent lung transplant w/u on 2/21 for increased SOB and hypoxia. Required V-A ECMO for right heart failure on 2/27. Now s/p bilateral sequential lung transplant on 3/1, ECMO decannulation on 3/3. Post-op course c/b hemodynamic instability requiring pressors and iFlolan (off, but now on PO Revatio). Extubated 3/7, sating well on RA. Making gradual progress overall.        S/p bilateral lung transplant on 3/1/18 for acute hypoxic/hypercapneic respiratory failure 2/2 ILD.  Post-op Pulm HTN 2/2 ILD.   Adequate graft function. Weaned off iFlolan, however still on PO Revatio. Extubated 3/6, sating well on RA. Minimal pulmonary symptoms. Last chest tube removed 3/13. Surveillance bronchoscopy 3/13 revealing bilateral anastomoses intact and patent, thick yellow secretions around right anastomosis, suctioned and removed.   - Antibiotics as noted below  - Diuretics as noted below  - Continue aggressive pulmonary toilet with acapella/incentive spirometry q 1hr while awake.                     Continue 3-drug immunosuppression:  - Tacrolimus 3mg BID. Target goal 10-12 (decresaed for RADHA). Tacrolimus drug level 6.3 today. Will increase to 4mg BID.    - MMF 1500 g BID  - Prednisolone 12.9mg BID. Steroid taper below:    Date AM PM   3/15/18 13 12.5   3/22/18 12.5 12.5   4/26/18 12.5 10   5/24/18 10 10   6/22/18 10 7.5   7/20/18 7.5 7.5   8/24/18 7.5 5   9/21/18 5 5     Prophylaxis:   PJP: Bactrim  CMV: vangancyclovir  Fungal: Nystatin        Donor Recipient Intervention   CMV status  Pos   Pos   Valgancyclovir POD#8   EBV status Pos Pos Immune    HSV status N/A  Pos Immune       Infectious disease.  SIRS, resolved.  No known infectious disease history. Post-operative SIRS likely a response from multiple procedures (VA ECMO cannulation and decannulation) and transplant surgery. Has been afebrile for some time. Negative recipient bronch culture from time of transplant. Pan cultured 3/4 NGTD.   - Donor culture at [facility] grew MRSA -- continue vancomycin today, which will be 2-week course  - Donor culture at OSH grew MRSA, Strep pneumoniae, Moraxella catarrhalis, Haemophilus species. Continue pip/tazo through today, which will be 2-week course.      Small-mod L pleural effusion, likely hemothorax.  Pt required 1 unit pRBCs on 3/8. Will continue to monitor with imaging. If worsening, could consider VATS versus chest tube placement w/ lytic therapy.         Steroid-induced hyperglycemia.  Transferred out of ICU on insulin drip. Transitioned to intermittent SQ insulin. Glucose relatively well controlled.   - Appreciate Endocrine's assistance managing glucose / insulin needs  - Monitor glucose ACHS  - Hypoglycemia protocol     Dysphagia.  Noted post-transplant. Working with SLP.   - Currently on Dysphagia diet level 3 with thin liquids   - Appreciate SLP assistance    Diarrhea.  Large bowel loops on imaging.  Pain on defecation.  Diarrhea slowly improving, likely 2/2 antibiotics, immunosuppression agents and/or TFs. Pain while defecation could be anal fissure versus hemorrhoid. C diff negative.   - Repeat Abd XR today to further assess gaseous distention noted on CXR  - Desitin cream daily BID prn  - Imodium prn     LUE DVT, provoked.  Found on UE ultrasound on 3/7. Initiated heparin gtt as bridge with warfarin added on 3/12.  - Continue low-intensity heparin gtt as bridge to therapeutic INR  - Warfarin, pharm to dose  - INR goal 2-3. Will need 3-months of anticoagulation.      RADHA.  Suspect secondary to meds and  diuresis. Improving nicely.  - Discontinue Bumex IV today.   - Trend BMP daily  - Avoid nephrotoxic agents as possible  - Decrease tacro goal 10-12       FEN: Dysphagia diet level 3 with thin liquids and TF  PPX: Heparin gtt, warfarin, PPI   CODE: Full  DISPO: Inpatient cares. TCU likely later this week.       Patient discussed with Dr. Figueroa.    Vibha Albert, APRN, CNP  Inpatient Nurse Practitioner  Pulmonary [facility]  Pager [phone]        Subjective & Interval History:     Last 24 hour vital signs, labs and care team notes reviewed.    No acute events overnight. Remains afebrile, hemodynamically stable. Drowsy this AM, but arousable. Answering questions appropriately. Endorsing non-productive cough, improving. Denies SOB at rest. Looking forward to taking a shower and walking with PT later today.               Review of Systems:     C: no fever, no chills, no change in weight, + decrease appetite  INTEGUMENTARY/SKIN: no rash or obvious new lesions  ENT/MOUTH: no sore throat, no sinus pain, no nasal drainage  RESP: see interval history  CV: no chest pain, no palpitations, + peripheral edema, no orthopnea  GI: no nausea, no vomiting, + stools, no reflux symptoms  : no dysuria  MUSCULOSKELETAL: no myalgias, no arthralgias  ENDOCRINE: blood sugars with adequate control  NEURO: no headache, no numbness or tingling  PSYCHIATRIC: mood stable          Medications:     Active Medications:    warfarin  6 mg Oral ONCE at 18:00     tacrolimus  4 mg Oral BID IS     insulin isophane human  35 Units Subcutaneous Q24H     insulin aspart  1-12 Units Subcutaneous TID     insulin aspart  1-7 Units Subcutaneous BID AC     piperacillin-tazobactam  3.375 g Intravenous Q6H     valGANciclovir  450 mg Oral or Feeding Tube Once per day on Tue Thu Sat     insulin aspart   Subcutaneous TID w/meals     vancomycin (VANCOCIN) IV  1,250 mg Intravenous Q36H     sulfamethoxazole-trimethoprim  1 tablet Oral or NG Tube Daily     multivitamin,  therapeutic with minerals  1 tablet Oral or Feeding Tube Daily     metoprolol tartrate  25 mg Oral BID     pantoprazole  40 mg Oral or Feeding Tube Daily     QUEtiapine  50 mg Oral At Bedtime     levalbuterol  2 puff Inhalation Q4H JULES     sildenafil  10 mg Oral or Feeding Tube Q8H     nystatin  1,000,000 Units Swish & Swallow 4x Daily     mycophenolate  1,500 mg Oral BID IS    Or     mycophenolate  1,500 mg Oral or NG Tube BID IS     prednisoLONE  0.25 mg/kg (Dosing Weight) Oral or NG Tube BID     Active PRN Medications:  heparin, Warfarin Therapy Reminder, simethicone, HOLD MEDICATION, zinc oxide, insulin aspart, sennosides, potassium phosphate (KPHOS) in D5W IV, potassium phosphate (KPHOS) in D5W IV, potassium phosphate (KPHOS) in D5W IV, potassium phosphate (KPHOS) in D5W IV, potassium phosphate (KPHOS) in D5W IV, artificial tears, metoprolol, IV fluid REPLACEMENT ONLY, naloxone, acetaminophen, oxyCODONE IR, ondansetron **OR** ondansetron, IV fluid REPLACEMENT ONLY, glucose **OR** dextrose **OR** glucagon, potassium chloride, potassium chloride, potassium chloride, potassium chloride with lidocaine, potassium chloride, magnesium sulfate, magnesium sulfate         Physical Exam:     Constitutional: Drowsy, but arousable. Alert, in no apparent distress.   HEENT: Eyes with pink conjunctivae, no scleral jaundice. Oral mucosa moist without lesions. Neck supple without lymphadenopathy.   PULM: Reduced air flow bilaterally. Clear lung sounds anteriorly. No crackles, no rhonchi, no wheezes.   CV: Normal S1 and S2. RRR. No murmur, gallop, or rub. Trace BLE edema. Peripheral pulses intact.   ABD: NABS, soft, nontender, + distended. No guarding.   MSK: Moves all extremities. No apparent muscle wasting.   NEURO: Alert and oriented x 4, conversant.   SKIN: Warm, dry. No pruritus. No rash on limited exam.   PSYCH: Mood stable, judgment and insight appropriate.?     Lines, Drains, and Devices:  Peripheral IV 03/07/18  "Right;Anterior Upper forearm (Active)   Site Assessment [facility] 3/13/2018  4:00 AM   Line Status Infusing 3/13/2018  4:00 AM   Phlebitis Scale 0-->no symptoms 3/13/2018  4:00 AM   Infiltration Scale 0 3/13/2018  4:00 AM   Infiltration Site Treatment Method  None 3/13/2018  4:00 AM   Extravasation? No 3/13/2018  4:00 AM   Number of days:6       Peripheral IV 03/08/18 Right Hand (Active)   Site Assessment [facility] 3/13/2018  4:00 AM   Line Status Infusing 3/13/2018  4:00 AM   Phlebitis Scale 0-->no symptoms 3/13/2018  4:00 AM   Infiltration Scale 0 3/13/2018  4:00 AM   Infiltration Site Treatment Method  None 3/13/2018  4:00 AM   Extravasation? No 3/13/2018  4:00 AM   Number of days:5       Peripheral IV 03/10/18 Right Upper forearm (Active)   Site Assessment [facility] 3/13/2018  4:00 AM   Line Status Saline locked 3/13/2018  4:00 AM   Phlebitis Scale 0-->no symptoms 3/13/2018  4:00 AM   Infiltration Scale 0 3/13/2018  4:00 AM   Infiltration Site Treatment Method  None 3/13/2018  4:00 AM   Extravasation? No 3/13/2018  4:00 AM   Number of days:3            Data:     All vital signs, laboratory and imaging data for the past 24 hours reviewed.      Vital signs:  Temp: 97.7  F (36.5  C) Temp src: Oral BP: 108/58 Pulse: 106 Heart Rate: 108 Resp: 22 SpO2: 98 % O2 Device: None (Room air) Oxygen Delivery: 3 LPM Height: 160 cm (5' 3\") Weight: 61 kg (134 lb 7.7 oz)    Weight trend:   Vitals:    03/12/18 0400 03/13/18 0526 03/14/18 0417   Weight: 59.1 kg (130 lb 4.7 oz) 60 kg (132 lb 4.4 oz) 61 kg (134 lb 7.7 oz)        I/O:   Intake/Output Summary (Last 24 hours) at 03/13/18 1316  Last data filed at 03/13/18 0930   Gross per 24 hour   Intake             1250 ml   Output             1532 ml   Net             -282 ml       Labs    CMP:     Recent Labs  Lab 03/14/18  1013 03/14/18  0640 03/13/18  0455 03/12/18  0633 03/11/18  0555 03/10/18  0330 03/09/18  0430 03/08/18  0403   NA  --  142 140 143 140 142 138 142   POTASSIUM 4.2 5.4* 4.1 4.0 3.6 " 3.6 3.5 3.9   CHLORIDE  --  110* 105 105 102 104 102 107   CO2  --  18* 26 26 29 26 27 24   ANIONGAP  --  14 10 12 9 12 10 11   GLC  --  152* 194* 184* 138* 132* 127* 147*   BUN  --  44* 47* 48* 49* 47* 42* 48*   CR  --  1.24* 1.43* 1.53* 1.58* 1.51* 1.41* 1.33*   GFRESTIMATED  --  45* 38* 35* 34* 36* 39* 41*   GFRESTBLACK  --  54* 46* 43* 41* 43* 47* 50*   CHELSEY  --  8.2* 8.5 8.4* 8.2* 8.1* 7.8* 6.8*   MAG  --  1.6 2.1 1.7 2.1 1.7 2.0 2.2   PHOS  --  3.1 2.4* 2.8 2.9 3.2 3.5 2.8   ALBUMIN  --   --   --   --  2.8* 3.3* 3.1* 1.8*   ALT  --   --   --   --   --  22 21 28     CBC:     Recent Labs  Lab 03/14/18  0640 03/13/18  1130 03/13/18  0455 03/12/18  0633   WBC 12.3* 15.8* 17.3* 14.9*   RBC 2.42* 2.82* 2.62* 2.82*   HGB 7.3* 8.6* 7.9* 8.5*   HCT 23.9* 27.3* 24.9* 27.0*   MCV 99 97 95 96   MCH 30.2 30.5 30.2 30.1   MCHC 30.5* 31.5 31.7 31.5   RDW 19.3* 18.9* 18.5* 18.2*    260 243 228     INR:     Recent Labs  Lab 03/14/18  0640 03/13/18  0455 03/12/18  1123   INR 1.03 0.99 1.07     Glucose:   Recent Labs  Lab 03/14/18  0640 03/14/18  0439 03/13/18  2233 03/13/18  1922 03/13/18  1815 03/13/18  1548 03/13/18  1254  03/13/18  0455  03/12/18  0633  03/11/18  0555  03/10/18  0330  03/09/18  0430   *  --   --   --   --   --   --   --  194*  --  184*  --  138*  --  132*  --  127*   BGM  --  154* 216* 121* 124* 92 101*  < >  --   < >  --   < >  --   < >  --   < >  --    < > = values in this interval not displayed.  Blood Gas: No lab results found in last 7 days.  Culture Data No results for input(s): CULT in the last 168 hours.  Virology Data:   Lab Results   Component Value Date    FLUAH1 Negative 02/27/2018    FLUAH3 Negative 02/27/2018    [id number] Negative 02/27/2018    IFLUB Negative 02/27/2018    RSVA Negative 02/27/2018    RSVB Negative 02/27/2018    PIV1 Negative 02/27/2018    PIV2 Negative 02/27/2018    PIV3 Negative 02/27/2018    HMPV Negative 02/27/2018    HRVS Negative 02/27/2018    ADVBE Negative  02/27/2018    ADVC Negative 02/27/2018     Historical CMV results (last 3 of prior testing):  Lab Results   Component Value Date    CMVQNT Canceled, Test credited (A) 03/01/2018    CMVQNT Canceled, Test credited (A) 03/01/2018     Lab Results   Component Value Date    CMVLOG Canceled, Test credited 03/01/2018    CMVLOG Canceled, Test credited 03/01/2018     Urine Studies    Recent Labs   Lab Test  03/04/18   1425   URINEPH  5.5   NITRITE  Negative   LEUKEST  Negative   WBCU  5

## 2018-03-14 NOTE — PROGRESS NOTES
Transplant Social Work Services Progress Note      Date of Initial Social Work Evaluation: 2/20/2018  Collaborated with: [facility] admissions, pulm team    Data: per pulmonary team, patient likely ready for d/c to rehab on Friday.  Updated [facility] admissions.   patient was seen by PM&R yesterday and felt appropriate for ARU.    Intervention: requested rehab admissions seek insurance approval for admission on Friday.  Updated patient.    Assessment: improving, but still deconditioned.    Education provided by SW: difference between ARU and TCU.    Plan:    Discharge Plans in Progress: [facility].      Barriers to d/c plan: medical stability    Follow up Plan: potential d/c to rehab on Friday.

## 2018-03-14 NOTE — PROGRESS NOTES
Calorie Count  Intake recorded for: 3/13  Kcals: 576  Protein: 37g  # Meals Recorded: 100% crumb cod w/ isaura sauce, diet lemonade, 75% milk, 50% omelet w./ mushrooms & cheese, peaches  # Supplements Recorded: 0

## 2018-03-14 NOTE — PLAN OF CARE
Problem: Patient Care Overview  Goal: Plan of Care/Patient Progress Review  Outcome: No Change  Neuro: A&O x 4; Lethargic at times. PERRLA.   Cardiac: ST/SR. Gave 1L bolus for SBPs in 80s. BPs now in low 100s systolic. Afebrile.   Respiratory: RA. SOB with exertion. Coarse lung sounds. Occasional, congested cough. Encouraging coughing/deep-breathing.   GI/: Liquid/loose stools. Adequate urine output. Up to bedside commode.   Diet/Appetite: Cycled TF from 6pm - 8am at goal of 70cc/hour at 10pm. DD3 with thin liquids. Calorie counts. Q4hour BS.   Activity: AST of 1 up to bedside commode. Encouraging activity.   Pain: Denies.   Skin: Blanchable redness on sacrum/coccyx. Mepilex placed and weight shifting encouraged. Incision sites approximated with liquid bandage and JOSHUA. CT sites covered with gauze dressings.   LDAs: R hand PIV with heparin gtt at 900 u/hour. Awaiting 0500 Hep 10A. INR goal 2-3.  R forearm PIV tko for ABX. R forearm saline locked for BS checks.     Continue with POC. Notify primary team with changes.   Lupe Lentz RN

## 2018-03-14 NOTE — PROGRESS NOTES
Diabetes Consult Daily  Progress Note          Assessment/Plan:     Ms. Kecia Blue is a 57-year-old woman with a history of ILD and pulmonary hypertension, dermatomyositis, RA, and Raynauds syndrome, who is admitted for emergent lung transplant workup on 2/21 for increased shortness of breath and hypoxia, required [facility] ECM[facility] for R heart failure, now s/p bilateral sequential lung transplant on 3/1.  No history of diabetes.  Kecia is experiencing hyperglycemia secondary to enteral feeds and steroids.     Fair glucose control, with relatively low insulin dosing.    Plan:  - NPH to 35 units at start of TF cycle, will increase tomorrow night if still reached 200 during cycle  -continue meal aspart 1unit/15g CHO with meals and snacks.  -Correction aspart: high intensity at 1800, 2200, 0400, medium intensity before bfast and before lunch.  -monitor glucoses ac, hs and 0400.       Outpatient diabetes follow up: tbd  Plan discussed with patient, bedside RN           Interval History:   The last 24 hours progress and nursing notes reviewed.  Cycled Nutren 70 cc/h 8298-8331.  Prednisone 12.9 mg BID---> to 13 + 12.5 tomorrow, then taper next on 3/22  Creatinine improving.    Ate two very low-carb meals yesterday.  Tolerated goal volume TF fine last night.  Feeling more fatigued today.  Could be related to increased activity yesterday.          Recent Labs  Lab 03/14/18  0640 03/14/18  0439 03/13/18  2233 03/13/18  1922 03/13/18  1815 03/13/18  1548 03/13/18  1254  03/13/18  0455  03/12/18  0633  03/11/18  0555  03/10/18  0330  03/09/18  0430   *  --   --   --   --   --   --   --  194*  --  184*  --  138*  --  132*  --  127*   BGM  --  154* 216* 121* 124* 92 101*  < >  --   < >  --   < >  --   < >  --   < >  --    < > = values in this interval not displayed.            Review of Systems:   See interval hx          Medications:       Active Diet Order      Dysphagia Diet Level 3 Advanced Thin  "Liquids (water, ice chips, juice, milk gelatin, ice cream, etc)     Physical Exam:  Gen: resting in bed, in NAD , but fatigued  HEENT: NC/AT, mucous membranes are moist  Resp: Unlabored, but more rapid  Neuro:oriented x3, communicating clearly  /58 (BP Location: Right arm)  Pulse 106  Temp 97.7  F (36.5  C) (Oral)  Resp 22  Ht 1.6 m (5' 3\")  Wt 61 kg (134 lb 7.7 oz)  SpO2 98%  BMI 23.82 kg/m2           Data:     Lab Results   Component Value Date    A1C 5.5 03/01/2018    A1C Canceled, Test credited 03/01/2018    A1C 5.3 02/27/2018            Recent Labs   Lab Test  03/14/18   1013  03/14/18   0640  03/13/18   0455   NA   --   142  140   POTASSIUM  4.2  5.4*  4.1   CHLORIDE   --   110*  105   CO2   --   18*  26   ANIONGAP   --   14  10   GLC   --   152*  194*   BUN   --   44*  47*   CR   --   1.24*  1.43*   CHELSEY   --   8.2*  8.5     CBC RESULTS:   Recent Labs   Lab Test  03/14/18   1214  03/14/18   0640   WBC   --   12.3*   RBC   --   2.42*   HGB  7.9*  7.3*   HCT   --   23.9*   MCV   --   99   MCH   --   30.2   MCHC   --   30.5*   RDW   --   19.3*   PLT   --   208     Rona Palm ADRIÁN [facility] [phone]    Diabetes Management job code [id number]          "

## 2018-03-14 NOTE — PLAN OF CARE
Problem: Patient Care Overview  Goal: Plan of Care/Patient Progress Review  Discharge Planner PT   Patient plan for discharge: ARU  Current status: Supine<>sit, elevated HOB, with pillow and min A for LE and trunk management, cues for sternal precautions. Sit<>stand transfers with min A and cues for precautions. Pillow used during all transfers. Ambulates 12' x2 and 40' x2 with FWW, CGA, and w/c follow. Sats 96% on room air after ambulation.   Barriers to return to prior living situation: Medical needs, deconditioning, weakness, impaired activity tolerance  Recommendations for discharge: ARU  Rationale for recommendations: Pt presents with decreased functional mobility. Would benefit from aggressive therapies to address the above impairments.       Entered by: Kd Lorenzo 03/14/2018 4:10 PM

## 2018-03-14 NOTE — PLAN OF CARE
Problem: Patient Care Overview  Goal: Plan of Care/Patient Progress Review  D:  Temp: 97.6  F (36.4  C) Temp src: Oral BP: 113/77 Heart Rate: 102 Resp: 22 SpO2: 92 % O2 Device: None (Room air)  .  Patient up in chair now and worked with PT/OT. Eating 3 meals today. Continue calorie counts.  Tube feeds cycles 6pm to 8am. BM x2 today.  Abdominal xray done today, shows improved/decreased gas filled loops. Heparin IV is in therapeutic range. Next 10a level is in the AM.  P:  Will continue with plan of care and close monitoring.       Problem: Chronic Respiratory Difficulty Comorbidity  Goal: Chronic Respiratory Difficulty  Patient comorbidity will be monitored for signs and symptoms of Respiratory Difficulty (Chronic) condition.  Problems will be absent, minimized or managed by discharge/transition of care.   No SOB or dyspnea. RRR, unlabored. O2 sats high 90s. Patient using IS q1-2 hour while awake. Cough and deep breath encouraged.

## 2018-03-15 ENCOUNTER — APPOINTMENT (OUTPATIENT)
Dept: OCCUPATIONAL THERAPY | Facility: CLINIC | Age: 58
DRG: 003 | End: 2018-03-15
Attending: INTERNAL MEDICINE
Payer: COMMERCIAL

## 2018-03-15 ENCOUNTER — APPOINTMENT (OUTPATIENT)
Dept: GENERAL RADIOLOGY | Facility: CLINIC | Age: 58
DRG: 003 | End: 2018-03-15
Attending: INTERNAL MEDICINE
Payer: COMMERCIAL

## 2018-03-15 ENCOUNTER — APPOINTMENT (OUTPATIENT)
Dept: SPEECH THERAPY | Facility: CLINIC | Age: 58
DRG: 003 | End: 2018-03-15
Attending: INTERNAL MEDICINE
Payer: COMMERCIAL

## 2018-03-15 ENCOUNTER — APPOINTMENT (OUTPATIENT)
Dept: PHYSICAL THERAPY | Facility: CLINIC | Age: 58
DRG: 003 | End: 2018-03-15
Attending: INTERNAL MEDICINE
Payer: COMMERCIAL

## 2018-03-15 LAB
ABO + RH BLD: NORMAL
ABO + RH BLD: NORMAL
ALBUMIN SERPL-MCNC: 2.4 G/DL (ref 3.4–5)
ALT SERPL W P-5'-P-CCNC: 22 U/L (ref 0–50)
ANION GAP SERPL CALCULATED.3IONS-SCNC: 10 MMOL/L (ref 3–14)
BLD GP AB SCN SERPL QL: NORMAL
BLD PROD TYP BPU: NORMAL
BLD PROD TYP BPU: NORMAL
BLD UNIT ID BPU: 0
BLOOD BANK CMNT PATIENT-IMP: NORMAL
BLOOD PRODUCT CODE: NORMAL
BPU ID: NORMAL
BUN SERPL-MCNC: 38 MG/DL (ref 7–30)
CALCIUM SERPL-MCNC: 7.8 MG/DL (ref 8.5–10.1)
CHLORIDE SERPL-SCNC: 106 MMOL/L (ref 94–109)
CO2 SERPL-SCNC: 23 MMOL/L (ref 20–32)
CREAT SERPL-MCNC: 1.13 MG/DL (ref 0.52–1.04)
ERYTHROCYTE [DISTWIDTH] IN BLOOD BY AUTOMATED COUNT: 20.3 % (ref 10–15)
GFR SERPL CREATININE-BSD FRML MDRD: 50 ML/MIN/1.7M2
GLUCOSE BLDC GLUCOMTR-MCNC: 109 MG/DL (ref 70–99)
GLUCOSE BLDC GLUCOMTR-MCNC: 134 MG/DL (ref 70–99)
GLUCOSE BLDC GLUCOMTR-MCNC: 143 MG/DL (ref 70–99)
GLUCOSE BLDC GLUCOMTR-MCNC: 192 MG/DL (ref 70–99)
GLUCOSE BLDC GLUCOMTR-MCNC: 93 MG/DL (ref 70–99)
GLUCOSE SERPL-MCNC: 199 MG/DL (ref 70–99)
HCT VFR BLD AUTO: 22.3 % (ref 35–47)
HGB BLD-MCNC: 6.9 G/DL (ref 11.7–15.7)
INR PPP: 1.37 (ref 0.86–1.14)
LMWH PPP CHRO-ACNC: 0.11 IU/ML
LMWH PPP CHRO-ACNC: 0.33 IU/ML
LMWH PPP CHRO-ACNC: NORMAL IU/ML
MAGNESIUM SERPL-MCNC: 2.1 MG/DL (ref 1.6–2.3)
MCH RBC QN AUTO: 30.7 PG (ref 26.5–33)
MCHC RBC AUTO-ENTMCNC: 30.9 G/DL (ref 31.5–36.5)
MCV RBC AUTO: 99 FL (ref 78–100)
NUM BPU REQUESTED: 1
PHOSPHATE SERPL-MCNC: 2.5 MG/DL (ref 2.5–4.5)
PLATELET # BLD AUTO: 215 10E9/L (ref 150–450)
POTASSIUM SERPL-SCNC: 4.3 MMOL/L (ref 3.4–5.3)
RBC # BLD AUTO: 2.25 10E12/L (ref 3.8–5.2)
SODIUM SERPL-SCNC: 139 MMOL/L (ref 133–144)
SPECIMEN EXP DATE BLD: NORMAL
TRANSFUSION STATUS PATIENT QL: NORMAL
TRANSFUSION STATUS PATIENT QL: NORMAL
WBC # BLD AUTO: 8.9 10E9/L (ref 4–11)

## 2018-03-15 PROCEDURE — 86850 RBC ANTIBODY SCREEN: CPT | Performed by: INTERNAL MEDICINE

## 2018-03-15 PROCEDURE — 25000131 ZZH RX MED GY IP 250 OP 636 PS 637: Performed by: SURGERY

## 2018-03-15 PROCEDURE — 25000132 ZZH RX MED GY IP 250 OP 250 PS 637: Performed by: ANESTHESIOLOGY

## 2018-03-15 PROCEDURE — 85520 HEPARIN ASSAY: CPT | Performed by: SURGERY

## 2018-03-15 PROCEDURE — 12000006 ZZH R&B IMCU INTERMEDIATE UMMC

## 2018-03-15 PROCEDURE — 40000193 ZZH STATISTIC PT WARD VISIT: Performed by: REHABILITATION PRACTITIONER

## 2018-03-15 PROCEDURE — 00000146 ZZHCL STATISTIC GLUCOSE BY METER IP

## 2018-03-15 PROCEDURE — 40000141 ZZH STATISTIC PERIPHERAL IV START W/O US GUIDANCE

## 2018-03-15 PROCEDURE — 97535 SELF CARE MNGMENT TRAINING: CPT | Mod: GO | Performed by: OCCUPATIONAL THERAPIST

## 2018-03-15 PROCEDURE — 84460 ALANINE AMINO (ALT) (SGPT): CPT | Performed by: THORACIC SURGERY (CARDIOTHORACIC VASCULAR SURGERY)

## 2018-03-15 PROCEDURE — 86923 COMPATIBILITY TEST ELECTRIC: CPT | Performed by: INTERNAL MEDICINE

## 2018-03-15 PROCEDURE — 85610 PROTHROMBIN TIME: CPT | Performed by: THORACIC SURGERY (CARDIOTHORACIC VASCULAR SURGERY)

## 2018-03-15 PROCEDURE — 25000132 ZZH RX MED GY IP 250 OP 250 PS 637: Performed by: SURGERY

## 2018-03-15 PROCEDURE — 97535 SELF CARE MNGMENT TRAINING: CPT | Mod: GO

## 2018-03-15 PROCEDURE — 85520 HEPARIN ASSAY: CPT | Performed by: THORACIC SURGERY (CARDIOTHORACIC VASCULAR SURGERY)

## 2018-03-15 PROCEDURE — 97168 OT RE-EVAL EST PLAN CARE: CPT | Mod: GO | Performed by: OCCUPATIONAL THERAPIST

## 2018-03-15 PROCEDURE — 80069 RENAL FUNCTION PANEL: CPT | Performed by: THORACIC SURGERY (CARDIOTHORACIC VASCULAR SURGERY)

## 2018-03-15 PROCEDURE — 25000125 ZZHC RX 250: Performed by: SURGERY

## 2018-03-15 PROCEDURE — 97530 THERAPEUTIC ACTIVITIES: CPT | Mod: GP | Performed by: REHABILITATION PRACTITIONER

## 2018-03-15 PROCEDURE — 25000128 H RX IP 250 OP 636: Performed by: SURGERY

## 2018-03-15 PROCEDURE — 27210429 ZZH NUTRITION PRODUCT INTERMEDIATE LITER

## 2018-03-15 PROCEDURE — 25000131 ZZH RX MED GY IP 250 OP 636 PS 637: Performed by: THORACIC SURGERY (CARDIOTHORACIC VASCULAR SURGERY)

## 2018-03-15 PROCEDURE — 85520 HEPARIN ASSAY: CPT | Performed by: INTERNAL MEDICINE

## 2018-03-15 PROCEDURE — 40000225 ZZH STATISTIC SLP WARD VISIT

## 2018-03-15 PROCEDURE — P9016 RBC LEUKOCYTES REDUCED: HCPCS | Performed by: INTERNAL MEDICINE

## 2018-03-15 PROCEDURE — 86901 BLOOD TYPING SEROLOGIC RH(D): CPT | Performed by: INTERNAL MEDICINE

## 2018-03-15 PROCEDURE — 40000133 ZZH STATISTIC OT WARD VISIT: Performed by: OCCUPATIONAL THERAPIST

## 2018-03-15 PROCEDURE — 25000132 ZZH RX MED GY IP 250 OP 250 PS 637: Performed by: PHYSICIAN ASSISTANT

## 2018-03-15 PROCEDURE — 25000132 ZZH RX MED GY IP 250 OP 250 PS 637: Performed by: THORACIC SURGERY (CARDIOTHORACIC VASCULAR SURGERY)

## 2018-03-15 PROCEDURE — 71045 X-RAY EXAM CHEST 1 VIEW: CPT

## 2018-03-15 PROCEDURE — 97116 GAIT TRAINING THERAPY: CPT | Mod: GP | Performed by: REHABILITATION PRACTITIONER

## 2018-03-15 PROCEDURE — 40000133 ZZH STATISTIC OT WARD VISIT

## 2018-03-15 PROCEDURE — 85027 COMPLETE CBC AUTOMATED: CPT | Performed by: THORACIC SURGERY (CARDIOTHORACIC VASCULAR SURGERY)

## 2018-03-15 PROCEDURE — 36415 COLL VENOUS BLD VENIPUNCTURE: CPT | Performed by: INTERNAL MEDICINE

## 2018-03-15 PROCEDURE — 86900 BLOOD TYPING SEROLOGIC ABO: CPT | Performed by: INTERNAL MEDICINE

## 2018-03-15 PROCEDURE — 83735 ASSAY OF MAGNESIUM: CPT | Performed by: THORACIC SURGERY (CARDIOTHORACIC VASCULAR SURGERY)

## 2018-03-15 PROCEDURE — 92526 ORAL FUNCTION THERAPY: CPT | Mod: GN

## 2018-03-15 RX ORDER — WARFARIN SODIUM 6 MG/1
6 TABLET ORAL
Status: COMPLETED | OUTPATIENT
Start: 2018-03-15 | End: 2018-03-15

## 2018-03-15 RX ORDER — VALGANCICLOVIR HYDROCHLORIDE 50 MG/ML
450 POWDER, FOR SOLUTION ORAL DAILY
Status: DISCONTINUED | OUTPATIENT
Start: 2018-03-16 | End: 2018-03-16

## 2018-03-15 RX ORDER — BUMETANIDE 1 MG/1
1 TABLET ORAL DAILY
Status: DISCONTINUED | OUTPATIENT
Start: 2018-03-16 | End: 2018-03-24 | Stop reason: HOSPADM

## 2018-03-15 RX ORDER — BUMETANIDE 0.25 MG/ML
2 INJECTION INTRAMUSCULAR; INTRAVENOUS ONCE
Status: COMPLETED | OUTPATIENT
Start: 2018-03-15 | End: 2018-03-15

## 2018-03-15 RX ORDER — POLYETHYLENE GLYCOL 3350 17 G/17G
17 POWDER, FOR SOLUTION ORAL DAILY PRN
Status: DISCONTINUED | OUTPATIENT
Start: 2018-03-15 | End: 2018-03-24 | Stop reason: HOSPADM

## 2018-03-15 RX ADMIN — NYSTATIN 1000000 UNITS: 500000 SUSPENSION ORAL at 20:59

## 2018-03-15 RX ADMIN — LEVALBUTEROL TARTRATE 2 PUFF: 45 AEROSOL, METERED ORAL at 09:11

## 2018-03-15 RX ADMIN — VALGANCICLOVIR HYDROCHLORIDE 450 MG: 50 POWDER, FOR SOLUTION ORAL at 08:11

## 2018-03-15 RX ADMIN — QUETIAPINE 50 MG: 50 TABLET ORAL at 21:36

## 2018-03-15 RX ADMIN — SULFAMETHOXAZOLE AND TRIMETHOPRIM 1 TABLET: 400; 80 TABLET ORAL at 08:11

## 2018-03-15 RX ADMIN — NYSTATIN 1000000 UNITS: 500000 SUSPENSION ORAL at 13:27

## 2018-03-15 RX ADMIN — INSULIN ASPART 2 UNITS: 100 INJECTION, SOLUTION INTRAVENOUS; SUBCUTANEOUS at 09:21

## 2018-03-15 RX ADMIN — MULTIVITAMIN 15 ML: LIQUID ORAL at 08:11

## 2018-03-15 RX ADMIN — LEVALBUTEROL TARTRATE 2 PUFF: 45 AEROSOL, METERED ORAL at 21:43

## 2018-03-15 RX ADMIN — PANTOPRAZOLE SODIUM 40 MG: 40 TABLET, DELAYED RELEASE ORAL at 08:11

## 2018-03-15 RX ADMIN — BUMETANIDE 2 MG: 0.25 INJECTION INTRAMUSCULAR; INTRAVENOUS at 14:44

## 2018-03-15 RX ADMIN — MYCOPHENOLATE MOFETIL 1500 MG: 200 POWDER, FOR SUSPENSION ORAL at 18:02

## 2018-03-15 RX ADMIN — SILDENAFIL CITRATE 10 MG: 10 POWDER, FOR SUSPENSION ORAL at 13:27

## 2018-03-15 RX ADMIN — METOPROLOL TARTRATE 25 MG: 25 TABLET, FILM COATED ORAL at 00:10

## 2018-03-15 RX ADMIN — INSULIN ASPART 1 UNITS: 100 INJECTION, SOLUTION INTRAVENOUS; SUBCUTANEOUS at 19:36

## 2018-03-15 RX ADMIN — LEVALBUTEROL TARTRATE 2 PUFF: 45 AEROSOL, METERED ORAL at 04:31

## 2018-03-15 RX ADMIN — NYSTATIN 1000000 UNITS: 500000 SUSPENSION ORAL at 08:11

## 2018-03-15 RX ADMIN — Medication 4 MG: at 18:01

## 2018-03-15 RX ADMIN — SILDENAFIL CITRATE 10 MG: 10 POWDER, FOR SUSPENSION ORAL at 21:00

## 2018-03-15 RX ADMIN — METOPROLOL TARTRATE 25 MG: 25 TABLET, FILM COATED ORAL at 13:26

## 2018-03-15 RX ADMIN — NYSTATIN 1000000 UNITS: 500000 SUSPENSION ORAL at 18:01

## 2018-03-15 RX ADMIN — PREDNISONE 12 MG: 1 TABLET ORAL at 18:03

## 2018-03-15 RX ADMIN — WARFARIN SODIUM 6 MG: 6 TABLET ORAL at 18:04

## 2018-03-15 RX ADMIN — LEVALBUTEROL TARTRATE 2 PUFF: 45 AEROSOL, METERED ORAL at 13:22

## 2018-03-15 RX ADMIN — MYCOPHENOLATE MOFETIL 1500 MG: 200 POWDER, FOR SUSPENSION ORAL at 08:11

## 2018-03-15 RX ADMIN — PIPERACILLIN SODIUM AND TAZOBACTAM SODIUM 3.38 G: 3; .375 INJECTION, POWDER, LYOPHILIZED, FOR SOLUTION INTRAVENOUS at 00:07

## 2018-03-15 RX ADMIN — PREDNISOLONE SODIUM PHOSPHATE 12.9 MG: 15 SOLUTION ORAL at 08:11

## 2018-03-15 RX ADMIN — HEPARIN SODIUM 1350 UNITS/HR: 10000 INJECTION, SOLUTION INTRAVENOUS at 07:42

## 2018-03-15 RX ADMIN — LEVALBUTEROL TARTRATE 2 PUFF: 45 AEROSOL, METERED ORAL at 18:05

## 2018-03-15 RX ADMIN — LEVALBUTEROL TARTRATE 2 PUFF: 45 AEROSOL, METERED ORAL at 00:17

## 2018-03-15 RX ADMIN — SILDENAFIL CITRATE 10 MG: 10 POWDER, FOR SUSPENSION ORAL at 04:31

## 2018-03-15 RX ADMIN — Medication 4 MG: at 08:11

## 2018-03-15 RX ADMIN — POTASSIUM PHOSPHATE, MONOBASIC AND POTASSIUM PHOSPHATE, DIBASIC 10 MMOL: 224; 236 INJECTION, SOLUTION INTRAVENOUS at 18:28

## 2018-03-15 RX ADMIN — HEPARIN SODIUM 1350 UNITS/HR: 10000 INJECTION, SOLUTION INTRAVENOUS at 14:53

## 2018-03-15 ASSESSMENT — ACTIVITIES OF DAILY LIVING (ADL)
ADLS_ACUITY_SCORE: 16
ADLS_ACUITY_SCORE: 16
ADLS_ACUITY_SCORE: 15
ADLS_ACUITY_SCORE: 16

## 2018-03-15 NOTE — PLAN OF CARE
Problem: Patient Care Overview  Goal: Plan of Care/Patient Progress Review  Outcome: No Change  [id number]  Neuro: A&Ox4, did need reorientation with time of day.   Cardiac: SR/ST. VSS.    Respiratory: Sating % on RA.   GI/: Adequate urine output. BM X2.  Diet/appetite: Tolerating DD3 diet with thin liquids. Eating fair, yury counts in place. TF cycled from 0630-6385 at 70 mL/hr.   Activity:  Assist of 1 up to commode, up to chair and in halls.  Pain: At acceptable level on current regimen.   Skin: No new deficits noted.   LDA's: R PIV x3, TKO line, Heparin gtt at 1200 units/hr, therapeutic, recheck 10A level tomorrow.     Plan: Magnesium level scheduled for tomorrow morning after Mg of 1.6 replaced today. Continue with POC. Notify primary team with changes.

## 2018-03-15 NOTE — PROGRESS NOTES
Diabetes Consult Daily  Progress Note          Assessment/Plan:     Ms. Kecia Blue is a 57-year-old woman with a history of ILD and pulmonary hypertension, dermatomyositis, RA, and Raynauds syndrome, who is admitted for emergent lung transplant workup on 2/21 for increased shortness of breath and hypoxia, required [facility] ECM[facility] for R heart failure, now s/p bilateral sequential lung transplant on 3/1.  No history of diabetes.  Kecia is experiencing hyperglycemia secondary to enteral feeds and steroids.     Fair glucose control, with relatively low insulin dosing.  Prednisone dose drops slightlytonight.    Plan:  - NPH 35 units at start of TF cycle  -continue meal aspart 1unit/15g CHO with meals and snacks.  -Correction aspart: high intensity at 1800, 2200, 0400, medium intensity before bfast and before lunch.  -monitor glucoses ac, hs and 0400.       Outpatient diabetes follow up: tbd  Plan discussed with patient, bedside RN           Interval History:   The last 24 hours progress and nursing notes reviewed.  Cycled Nutren 70 cc/h 2445-0774.  Prednisone 12.9 mg BID---> to 13 + 12 today, then taper next on 3/22  Creatinine improving further.    Ate more yesterday.  She recalls enjoying some fish.  Slept better and has a lot more energy today.    Expecting TCU v ARU at discharge.        Recent Labs  Lab 03/15/18  1343 03/15/18  0734 03/15/18  0440 03/14/18  2141 03/14/18  1533 03/14/18  1256 03/14/18  0640  03/13/18  0455  03/12/18  0633  03/11/18  0555  03/10/18  0330   GLC  --   --  199*  --   --   --  152*  --  194*  --  184*  --  138*  --  132*   * 143* 192* 113* 153* 89  --   < >  --   < >  --   < >  --   < >  --    < > = values in this interval not displayed.            Review of Systems:   See interval hx          Medications:       Active Diet Order      Dysphagia Diet Level 3 Advanced Thin Liquids (water, ice chips, juice, milk gelatin, ice cream, etc)     Physical Exam:  Gen:  "up in chair in NAD , bright smile  HEENT: NC/AT, mucous membranes are moist  Resp: Unlabored at rest  Neuro:oriented x3, communicating clearly  /79  Pulse 106  Temp 97.9  F (36.6  C) (Oral)  Resp 20  Ht 1.6 m (5' 3\")  Wt 61.1 kg (134 lb 9.6 oz)  SpO2 95%  BMI 23.84 kg/m2           Data:     Lab Results   Component Value Date    A1C 5.5 03/01/2018    A1C Canceled, Test credited 03/01/2018    A1C 5.3 02/27/2018            Recent Labs   Lab Test  03/15/18   0440  03/14/18   1013  03/14/18   0640   NA  139   --   142   POTASSIUM  4.3  4.2  5.4*   CHLORIDE  106   --   110*   CO2  23   --   18*   ANIONGAP  10   --   14   GLC  199*   --   152*   BUN  38*   --   44*   CR  1.13*   --   1.24*   CHELSEY  7.8*   --   8.2*     CBC RESULTS:   Recent Labs   Lab Test  03/15/18   0440   WBC  8.9   RBC  2.25*   HGB  6.9*   HCT  22.3*   MCV  99   MCH  30.7   MCHC  30.9*   RDW  20.3*   PLT  215     Rona Palm APRN [facility] [phone]    Diabetes Management job code [id number]          "

## 2018-03-15 NOTE — PLAN OF CARE
Problem: Patient Care Overview  Goal: Plan of Care/Patient Progress Review  Discharge Planner OT   Patient plan for discharge: ARU   Current status: Pt initially unable to clear buttocks from chair despite max A needing min Ax2 for sit > stand from chair. Pt ambulated in room with CGA and FWW. Pt mod A for toilet transfer and min A for georges cares. Pt completed shower transfer with mod cues for sequencing task and close min A and use of GBs. Pt completed full shower seated on shower chair, pt required mod A LB bathing and min A UB bathing. Pt max A for donning socks and min A for donning hospital gown. Pt on RA, SpO2 90-92% HR 90s.   Barriers to return to prior living situation: sternal precautions, weakness, decreased activity tolerance, balance deficits   Recommendations for discharge: ARU   Rationale for recommendations: Pt motivated in therapies to return to PLOF, with complex medical needs, and multidisciplinary goals. Pt would benefit from the intensity of ARU to maximize independence with ADLs.        Entered by: Samanta Ortiz 03/15/2018 4:06 PM

## 2018-03-15 NOTE — PLAN OF CARE
"Problem: Patient Care Overview  Goal: Plan of Care/Patient Progress Review  Outcome: Improving  /80 (BP Location: Right arm)  Pulse 106  Temp 98  F (36.7  C) (Oral)  Resp 22  Ht 1.6 m (5' 3\")  Wt 61.1 kg (134 lb 9.6 oz)  SpO2 95%  BMI 23.84 kg/m2  HR sinus rhythm intermittently tachy low 100s, on room air and maintaining adequate.  Pt A&Ox4, resting comfortably. Using call light appropriately. Pt denies pain. TF over night running at 70 mL/hr. Chest incisions JOSHUA with dermabond. Heparin gtt 1200 units/hr. Awaiting AM XA. See provider note re: Hgb this AM at 6.9. Voiding adequately, multiple loose BMs. Nursing will continue to follow the POC and update the MD team with concerns.      Problem: Chronic Respiratory Difficulty Comorbidity  Goal: Chronic Respiratory Difficulty  Patient comorbidity will be monitored for signs and symptoms of Respiratory Difficulty (Chronic) condition.  Problems will be absent, minimized or managed by discharge/transition of care.   Outcome: Improving  Pt stable on room air, still experiencing DESHPANDE.      "

## 2018-03-15 NOTE — PLAN OF CARE
Problem: Patient Care Overview  Goal: Plan of Care/Patient Progress Review  Discharge Planner PT   Patient plan for discharge: ARU  Current status: Supine>sit CGA, sit>supine min A at LEs. Sit<>stand with min-mod A and pillow. Ambulates 110' with CGA, FWW, and w/c follow. One seated rest break senior living through. On room air throughout.   Barriers to return to prior living situation: Medical needs, deconditioning, weakness, precautions  Recommendations for discharge: ARU  Rationale for recommendations: Pt presents with impaired activity tolerance and requires assist for mobility 2/2 weakness and sternal precautions. Would benefit from aggressive therapies to improve functional mobility.       Entered by: Kd Lorenzo 03/15/2018 5:12 PM

## 2018-03-15 NOTE — PROGRESS NOTES
03/15/18 1030   General Information   Discipline OT   Onset of Edema (acute onset progressing over past week, per patient.)   Affected Body Part(s) Right LE;Left LE   Etiology Comments Decreased mobility, post-op transplant    Pain   Pain comments patient reports no pain in LEs at this time.   Edema Examination / Assessment   Skin Condition Pitting;Dryness;Intact   Skin Condition Comments Skin intact with generalized 1+ pitting in distal LEs.    ROM   Range of Motion (WFL) no deficits were identified   Strength   Strength (WFL) (generalized weakness throughout. )   Sensation   Sensation (WFL) no deficits were identified  (light touch intact. )   Assessment/Plan   Patient presents with Edema   Assessment Recommend use of compression stockings at this time for management of acute onset LE and increased ease with functional mobility.    Assessment of Occupational Performance 1-3 Performance Deficits   Identified Performance Deficits Decreased functional mobility    Clinical Decision Making (Complexity) Low complexity   Planned Edema Interventions Gradient compression bandaging;Fit for compression garment;Exercises;Precautions to prevent infection/exacerbation;Education   Treatment Frequency 3 times/wk   Treatment Duration 1 week   Patient, Family and/or Staff in agreement with plan of care. Yes   Risks and benefits of treatment have been explained. Yes   Total Evaluation Time   Total Evaluation Time (Minutes) 5

## 2018-03-15 NOTE — PROGRESS NOTES
Pulmonary Medicine  Cystic Fibrosis - Lung Transplant Daily Progress Note   March 15, 2018       Patient: Kecia Blue  MRN: [id number]  Transplant Date: 2/21/2018 (POD# 14)  Admission date: 2/21/2018  Hospital Day #22          Assessment and Plan:     Kecia Blue is a 58 y/o female w/PMHx significant for dermatomyositis (on immunosuppression), seronegative RA, ILD, and pulmonary hypertension who was admitted for emergent lung transplant w/u on 2/21 for increased SOB and hypoxia. Required V-A ECMO for right heart failure on 2/27. Now s/p bilateral sequential lung transplant on 3/1, ECMO decannulation on 3/3. Post-op course c/b hemodynamic instability requiring pressors and iFlolan (off, but now on PO Revatio). Extubated 3/7, sating well on RA. Making gradual progress overall.        S/p bilateral lung transplant on 3/1/18 for acute hypoxic/hypercapneic respiratory failure 2/2 ILD.  Post-op Pulm HTN 2/2 ILD.   Adequate graft function. Weaned off iFlolan, however still on PO Revatio. Extubated 3/6, sating well on RA. Minimal pulmonary symptoms. Last chest tube removed 3/13. Surveillance bronchoscopy 3/13 revealing bilateral anastomoses intact and patent, thick yellow secretions around right anastomosis, suctioned and removed.   - Diuretics as noted below  - Continue aggressive pulmonary toilet with acapella/incentive spirometry q 1hr while awake.                     Continue 3-drug immunosuppression:  - Tacrolimus 4mg BID. Target goal 10-12 (decresaed for RADHA).    - MMF 1500 g BID   - Prednisone dose reduction today from prednisolone 12.9mg BID --> pred 13mg qAM / 12mg qPM. Steroid taper below:    Date AM PM   3/15/18 13 12.5   3/22/18 12.5 12.5   4/26/18 12.5 10   5/24/18 10 10   6/22/18 10 7.5   7/20/18 7.5 7.5   8/24/18 7.5 5   9/21/18 5 5     Prophylaxis:   PJP: Bactrim  CMV: vangancyclovir  Fungal: Nystatin        Donor Recipient Intervention   CMV status  Pos   Pos  valgancyclovir    EBV status  Pos Pos Immune    HSV status N/A  Pos Immune       Infectious disease.  SIRS, resolved.  No known infectious disease history. Post-operative SIRS likely a response from multiple procedures (VA ECMO cannulation and decannulation) and transplant surgery. Has been afebrile for some time. Negative recipient bronch culture from time of transplant. Pan cultured 3/4 NGTD.   - Donor culture at [facility] grew MRSA. Received 2-week course of vancomycin.  - Donor culture at OSH grew MRSA, Strep pneumoniae, Moraxella catarrhalis, Haemophilus species. Received 2-week course of pip/tazo.      RADHA.  Volume overload.  Suspect secondary to meds and diuresis. Serum Cr+ improving nicely. Slight increase in LE edema likely 2/2 low albumin state.   - Bumex IV 2mg once today. Will start maintenance PO Bumex tomorrow.   - HERMELINDA stocking ordered 3/13. Have asked nursing to assist putting them on daily.   - Trend BMP daily  - Avoid nephrotoxic agents as possible  - Decrease tacro goal 10-12     Acute anemia.  Hgb slowly drifting down over the past one week. No evidence of bleeding. Likely 2/2 multiple lab draws, bone marrow suppressive medications.    - 1 unit pRBC today  - Recheck hemoglobin tomorrow AM    Steroid-induced hyperglycemia.  Transferred out of ICU on insulin drip. Transitioned to intermittent SQ insulin. Glucose relatively well controlled.   - Appreciate Endocrine's assistance managing glucose / insulin needs  - Monitor glucose ACHS  - Hypoglycemia protocol     Dysphagia.  Noted post-transplant. Working with SLP.   - Currently on Dysphagia diet level 3 with thin liquids   - Appreciate SLP assistance    Diarrhea.  Large bowel loops on imaging.  Pain on defecation.  Diarrhea slowly improving, likely 2/2 antibiotics, immunosuppression agents and/or TFs. Pain while defecation could be anal fissure versus hemorrhoid. C diff negative.   - Abd XR yesterday, personally reviewed, with decrease gaseous distention and stool burden. Overall  improved.  - Desitin cream daily BID prn  - Imodium prn    Small-mod L pleural effusion, likely hemothorax.  Noted on Chest CT 3/8. Repeat CXR relatively unchanged in size. Required 1 unit pRBCs on 3/8. Will continue to monitor with imaging. If worsening, could consider VATS versus chest tube placement w/ lytic therapy.         LUE DVT, provoked.  Found on UE ultrasound on 3/7. Initiated heparin gtt as bridge with warfarin added on 3/12.  - Continue low-intensity heparin gtt as bridge to therapeutic INR  - Warfarin, pharm to dose  - INR goal 2-3. Will need 3-months of anticoagulation.        FEN: Dysphagia diet level 3 with thin liquids and TF  PPX: Heparin gtt, warfarin, PPI   CODE: Full  DISPO: Inpatient cares. TCU early next week (~ possibly Monday).       Patient discussed with Dr. Figueroa.    ADRIÁN Amato, CNP  Inpatient Nurse Practitioner  [facility]  Pager [phone]        Subjective & Interval History:     Last 24 hour vital signs, labs and care team notes reviewed.    No acute events overnight. Ambulated in hallway with PT yesterday. Continues to be very motivated to work with PT/OT. No new complaints. Having intermittent productive cough. Denies SOB. Hgb 6.9 this AM, 1 unit pRBC ordered.            Review of Systems:     C: no fever, no chills, no change in weight, + decrease appetite  INTEGUMENTARY/SKIN: no rash or obvious new lesions  ENT/MOUTH: no sore throat, no sinus pain, no nasal drainage  RESP: see interval history  CV: no chest pain, no palpitations, + peripheral edema, no orthopnea  GI: no nausea, no vomiting, + stools, no reflux symptoms  : no dysuria  MUSCULOSKELETAL: no myalgias, no arthralgias  ENDOCRINE: blood sugars with adequate control  NEURO: no headache, no numbness or tingling  PSYCHIATRIC: mood stable          Medications:     Active Medications:    [START ON 3/16/2018] valGANciclovir  450 mg Oral or Feeding Tube Daily     bumetanide  2 mg Intravenous Once     [START ON  3/16/2018] bumetanide  1 mg Oral Daily     tacrolimus  4 mg Oral BID IS     multivitamins with minerals  15 mL Oral Daily     insulin isophane human  35 Units Subcutaneous Q24H     insulin aspart  1-12 Units Subcutaneous TID     insulin aspart  1-7 Units Subcutaneous BID AC     insulin aspart   Subcutaneous TID w/meals     sulfamethoxazole-trimethoprim  1 tablet Oral or NG Tube Daily     metoprolol tartrate  25 mg Oral BID     pantoprazole  40 mg Oral or Feeding Tube Daily     QUEtiapine  50 mg Oral At Bedtime     levalbuterol  2 puff Inhalation Q4H JULES     sildenafil  10 mg Oral or Feeding Tube Q8H     nystatin  1,000,000 Units Swish & Swallow 4x Daily     mycophenolate  1,500 mg Oral BID IS    Or     mycophenolate  1,500 mg Oral or NG Tube BID IS     prednisoLONE  0.25 mg/kg (Dosing Weight) Oral or NG Tube BID     Active PRN Medications:  polyethylene glycol, heparin, Warfarin Therapy Reminder, simethicone, HOLD MEDICATION, zinc oxide, insulin aspart, sennosides, potassium phosphate (KPHOS) in D5W IV, potassium phosphate (KPHOS) in D5W IV, potassium phosphate (KPHOS) in D5W IV, potassium phosphate (KPHOS) in D5W IV, potassium phosphate (KPHOS) in D5W IV, artificial tears, metoprolol, IV fluid REPLACEMENT ONLY, naloxone, acetaminophen, oxyCODONE IR, ondansetron **OR** ondansetron, IV fluid REPLACEMENT ONLY, glucose **OR** dextrose **OR** glucagon, potassium chloride, potassium chloride, potassium chloride, potassium chloride with lidocaine, potassium chloride, magnesium sulfate, magnesium sulfate         Physical Exam:     Constitutional: Awake. Alert, in no apparent distress.   HEENT: Eyes with pink conjunctivae, no scleral jaundice. Oral mucosa moist without lesions. Neck supple without lymphadenopathy.   PULM: Reduced air flow bilaterally lower lobes. Clear lung sounds anteriorly & posteriorly. No crackles, no rhonchi, no wheezes.   CV: Normal S1 and S2. RRR. No murmur, gallop, or rub. 1+ bilat ankle/pedal  "edema. Peripheral pulses intact.   ABD: NABS, soft, nontender, + mildly distended. No guarding.   MSK: Moves all extremities. No apparent muscle wasting.   NEURO: Alert and oriented x 4, conversant.   SKIN: Warm, dry. No pruritus. No rash on limited exam.   PSYCH: Mood stable, judgment and insight appropriate.?     Lines, Drains, and Devices:  Peripheral IV 03/07/18 Right;Anterior Upper forearm (Active)   Site Assessment [facility] 3/13/2018  4:00 AM   Line Status Infusing 3/13/2018  4:00 AM   Phlebitis Scale 0-->no symptoms 3/13/2018  4:00 AM   Infiltration Scale 0 3/13/2018  4:00 AM   Infiltration Site Treatment Method  None 3/13/2018  4:00 AM   Extravasation? No 3/13/2018  4:00 AM   Number of days:6       Peripheral IV 03/08/18 Right Hand (Active)   Site Assessment [facility] 3/13/2018  4:00 AM   Line Status Infusing 3/13/2018  4:00 AM   Phlebitis Scale 0-->no symptoms 3/13/2018  4:00 AM   Infiltration Scale 0 3/13/2018  4:00 AM   Infiltration Site Treatment Method  None 3/13/2018  4:00 AM   Extravasation? No 3/13/2018  4:00 AM   Number of days:5       Peripheral IV 03/10/18 Right Upper forearm (Active)   Site Assessment [facility] 3/13/2018  4:00 AM   Line Status Saline locked 3/13/2018  4:00 AM   Phlebitis Scale 0-->no symptoms 3/13/2018  4:00 AM   Infiltration Scale 0 3/13/2018  4:00 AM   Infiltration Site Treatment Method  None 3/13/2018  4:00 AM   Extravasation? No 3/13/2018  4:00 AM   Number of days:3            Data:     All vital signs, laboratory and imaging data for the past 24 hours reviewed.      Vital signs:  Temp: 97.6  F (36.4  C) Temp src: Oral BP: 120/80 Pulse: 106 Heart Rate: 101 Resp: 24 SpO2: 100 % O2 Device: None (Room air) Oxygen Delivery: 3 LPM Height: 160 cm (5' 3\") Weight: 61.1 kg (134 lb 9.6 oz)    Weight trend:   Vitals:    03/13/18 0526 03/14/18 0417 03/15/18 0126   Weight: 60 kg (132 lb 4.4 oz) 61 kg (134 lb 7.7 oz) 61.1 kg (134 lb 9.6 oz)        I/O:   Intake/Output Summary (Last 24 hours) at 03/13/18 " 1316  Last data filed at 03/13/18 0930   Gross per 24 hour   Intake             1250 ml   Output             1532 ml   Net             -282 ml       Labs    CMP:     Recent Labs  Lab 03/15/18  0440 03/14/18  1013 03/14/18  0640 03/13/18  0455 03/12/18  0633 03/11/18  0555 03/10/18  0330 03/09/18  0430     --  142 140 143 140 142 138   POTASSIUM 4.3 4.2 5.4* 4.1 4.0 3.6 3.6 3.5   CHLORIDE 106  --  110* 105 105 102 104 102   CO2 23  --  18* 26 26 29 26 27   ANIONGAP 10  --  14 10 12 9 12 10   *  --  152* 194* 184* 138* 132* 127*   BUN 38*  --  44* 47* 48* 49* 47* 42*   CR 1.13*  --  1.24* 1.43* 1.53* 1.58* 1.51* 1.41*   GFRESTIMATED 50*  --  45* 38* 35* 34* 36* 39*   GFRESTBLACK 60*  --  54* 46* 43* 41* 43* 47*   CHELSEY 7.8*  --  8.2* 8.5 8.4* 8.2* 8.1* 7.8*   MAG 2.1  --  1.6 2.1 1.7 2.1 1.7 2.0   PHOS 2.5  --  3.1 2.4* 2.8 2.9 3.2 3.5   ALBUMIN 2.4*  --   --   --   --  2.8* 3.3* 3.1*   ALT 22  --   --   --   --   --  22 21     CBC:     Recent Labs  Lab 03/15/18  0440 03/14/18  1214 03/14/18  0640 03/13/18  1130 03/13/18  0455   WBC 8.9  --  12.3* 15.8* 17.3*   RBC 2.25*  --  2.42* 2.82* 2.62*   HGB 6.9* 7.9* 7.3* 8.6* 7.9*   HCT 22.3*  --  23.9* 27.3* 24.9*   MCV 99  --  99 97 95   MCH 30.7  --  30.2 30.5 30.2   MCHC 30.9*  --  30.5* 31.5 31.7   RDW 20.3*  --  19.3* 18.9* 18.5*     --  208 260 243     INR:     Recent Labs  Lab 03/15/18  0440 03/14/18  0640 03/13/18  0455 03/12/18  1123   INR 1.37* 1.03 0.99 1.07     Glucose:   Recent Labs  Lab 03/15/18  0734 03/15/18  0440 03/14/18  2141 03/14/18  1533 03/14/18  1256 03/14/18  0640 03/14/18  0439  03/13/18  0455  03/12/18  0633  03/11/18  0555  03/10/18  0330   GLC  --  199*  --   --   --  152*  --   --  194*  --  184*  --  138*  --  132*   * 192* 113* 153* 89  --  154*  < >  --   < >  --   < >  --   < >  --    < > = values in this interval not displayed.  Blood Gas: No lab results found in last 7 days.  Culture Data No results for input(s):  CULT in the last 168 hours.  Virology Data:   Lab Results   Component Value Date    FLUAH1 Negative 02/27/2018    FLUAH3 Negative 02/27/2018    [id number] Negative 02/27/2018    IFLUB Negative 02/27/2018    RSVA Negative 02/27/2018    RSVB Negative 02/27/2018    PIV1 Negative 02/27/2018    PIV2 Negative 02/27/2018    PIV3 Negative 02/27/2018    HMPV Negative 02/27/2018    HRVS Negative 02/27/2018    ADVBE Negative 02/27/2018    ADVC Negative 02/27/2018     Historical CMV results (last 3 of prior testing):  Lab Results   Component Value Date    CMVQNT Canceled, Test credited (A) 03/01/2018    CMVQNT Canceled, Test credited (A) 03/01/2018     Lab Results   Component Value Date    CMVLOG Canceled, Test credited 03/01/2018    CMVLOG Canceled, Test credited 03/01/2018     Urine Studies    Recent Labs   Lab Test  03/04/18   1425   URINEPH  5.5   NITRITE  Negative   LEUKEST  Negative   WBCU  5

## 2018-03-15 NOTE — PLAN OF CARE
Problem: Patient Care Overview  Goal: Plan of Care/Patient Progress Review  Discharge Planner OT   Patient plan for discharge: ARU  Current status: Pt required Min A and vc's for transfer supine<->seated EOB within precautions.Pt engaged in LE dressing with Mod A and vc's. Pt required CGA-Min A and vc's with FWW for transfer sit<->standing pivot and short distance ambulation. Pt completed 5.5 minutes of intermittent Nustep exercise with fair tolerance. Pt limited by fatigue, weakness and SOB. Therapist educated pt on discharge recommendations for ARU and contrast between ARU and TCU. Pt verbalize understanding. VSS.   Barriers to return to prior living situation: Decreased independence with functional transfers/ADLs, Decreased strength and endurance, Sternal precautions.   Recommendations for discharge: ARU  Rationale for recommendations: Pt will benefit from intense rehab to progress back to independence to return home.

## 2018-03-15 NOTE — PLAN OF CARE
Problem: Patient Care Overview  Goal: Plan of Care/Patient Progress Review  Outcome: Improving      Neuro: A&O x1. BLE 4/5. Other neuros intact.   Cardiac: SR/ST. B/P's remain WDL.   Respiratory: Lungs diminished at bases. On RA. Using ISP.   GI/: IV bumex given. Voiding adequately on BSC. 1 BM.   Diet/appe: Up to chair and BSC with 1A gaitbelt. Ambulated with PT.     Pain: Denies pain.   Skin: Mid sternal incision CDI. Chest tube sites intact with erethema and open to air   LDA's: 2 R. PIV's.     Heparin 10a therapeutic. Phosphorus replacing. Received 1 unit PRBC's.       Plan: Possible d/c tomorrow. Continue with POC.

## 2018-03-15 NOTE — PLAN OF CARE
Problem: Patient Care Overview  Goal: Plan of Care/Patient Progress Review    Discharge Planner SLP   Patient plan for discharge: hoping for ARU  Current status: Pt exhibits extended mastication time with advanced solids, though grossly functional with use of strategies such as small bites and liquid washes. Reviewed advanced diet menu and educated of safe swallow strategies. Reviewed SLP scope of practice, pt/spouse interested in cognitive-linguistic evaluation as appropriate given extended hospital stay.  Recommending regular diet and thin liquids. Pt should be upright, consume at slow pace, alternate liquids/solids every 2-3 bites, and take small bites/sips.   Barriers to return to prior living situation: activity tolerance, ? cognition  Recommendations for discharge: ARU, ongoing SLP for cognitive-linguistic evaluation as indicated, even if for short course of tx to maximize safety/independence   Rationale for recommendations: concern for cognition and safe return to IADLs, would benefit from SLP evaluation at next level of care to maximize safety/independence and reduce caregiver demands       Entered by: Danielle Vasquez 03/15/2018 4:33 PM

## 2018-03-15 NOTE — PROGRESS NOTES
Calorie Counts  Intake recorded for: 3/14  Kcals: 568  Protein: 17g  # Meals Recorded: 100% orange juice, 2 diet lemonades, 75% scrambled eggs, sausage kailash, 50% blueberry muffin, avel food cake   # Supplements Recorded: 0

## 2018-03-15 NOTE — PROVIDER NOTIFICATION
Critical Hgb of 6.9 brought called to writer at 0530. Pulmonary cross cover notified. MD stating to continue heparin gtt.

## 2018-03-15 NOTE — PLAN OF CARE
Problem: Patient Care Overview  Goal: Plan of Care/Patient Progress Review  Edema 6B: Initial edema evaluation completed. Patient presents with acute onset LE edema - skin intact with generalized 1+ pitting distally. Patient fit with size F comperm compression stockings from MTPs to knee creases. Compression stockings to be worn 23/24 hours per day and removed daily for skin cares however removed completely if causing pain/numbness or become soiled - see patient care order for details.

## 2018-03-16 ENCOUNTER — TELEPHONE (OUTPATIENT)
Dept: PHARMACY | Facility: CLINIC | Age: 58
End: 2018-03-16

## 2018-03-16 ENCOUNTER — APPOINTMENT (OUTPATIENT)
Dept: OCCUPATIONAL THERAPY | Facility: CLINIC | Age: 58
DRG: 003 | End: 2018-03-16
Attending: INTERNAL MEDICINE
Payer: COMMERCIAL

## 2018-03-16 LAB
ANION GAP SERPL CALCULATED.3IONS-SCNC: 10 MMOL/L (ref 3–14)
BUN SERPL-MCNC: 38 MG/DL (ref 7–30)
CALCIUM SERPL-MCNC: 8.5 MG/DL (ref 8.5–10.1)
CHLORIDE SERPL-SCNC: 109 MMOL/L (ref 94–109)
CO2 SERPL-SCNC: 23 MMOL/L (ref 20–32)
CREAT SERPL-MCNC: 1.03 MG/DL (ref 0.52–1.04)
ERYTHROCYTE [DISTWIDTH] IN BLOOD BY AUTOMATED COUNT: 20.5 % (ref 10–15)
GFR SERPL CREATININE-BSD FRML MDRD: 56 ML/MIN/1.7M2
GLUCOSE BLDC GLUCOMTR-MCNC: 126 MG/DL (ref 70–99)
GLUCOSE BLDC GLUCOMTR-MCNC: 144 MG/DL (ref 70–99)
GLUCOSE BLDC GLUCOMTR-MCNC: 149 MG/DL (ref 70–99)
GLUCOSE BLDC GLUCOMTR-MCNC: 154 MG/DL (ref 70–99)
GLUCOSE BLDC GLUCOMTR-MCNC: 65 MG/DL (ref 70–99)
GLUCOSE BLDC GLUCOMTR-MCNC: 71 MG/DL (ref 70–99)
GLUCOSE BLDC GLUCOMTR-MCNC: 89 MG/DL (ref 70–99)
GLUCOSE SERPL-MCNC: 127 MG/DL (ref 70–99)
HCT VFR BLD AUTO: 29.6 % (ref 35–47)
HGB BLD-MCNC: 9 G/DL (ref 11.7–15.7)
INR PPP: 1.36 (ref 0.86–1.14)
LMWH PPP CHRO-ACNC: 0.2 IU/ML
MAGNESIUM SERPL-MCNC: 1.7 MG/DL (ref 1.6–2.3)
MCH RBC QN AUTO: 30.6 PG (ref 26.5–33)
MCHC RBC AUTO-ENTMCNC: 30.4 G/DL (ref 31.5–36.5)
MCV RBC AUTO: 101 FL (ref 78–100)
PHOSPHATE SERPL-MCNC: 2.3 MG/DL (ref 2.5–4.5)
PLATELET # BLD AUTO: 215 10E9/L (ref 150–450)
POTASSIUM SERPL-SCNC: 4.7 MMOL/L (ref 3.4–5.3)
RBC # BLD AUTO: 2.94 10E12/L (ref 3.8–5.2)
SODIUM SERPL-SCNC: 142 MMOL/L (ref 133–144)
WBC # BLD AUTO: 9.2 10E9/L (ref 4–11)

## 2018-03-16 PROCEDURE — 27210429 ZZH NUTRITION PRODUCT INTERMEDIATE LITER

## 2018-03-16 PROCEDURE — 40000133 ZZH STATISTIC OT WARD VISIT: Performed by: OCCUPATIONAL THERAPIST

## 2018-03-16 PROCEDURE — 25000132 ZZH RX MED GY IP 250 OP 250 PS 637: Performed by: ANESTHESIOLOGY

## 2018-03-16 PROCEDURE — 85520 HEPARIN ASSAY: CPT | Performed by: INTERNAL MEDICINE

## 2018-03-16 PROCEDURE — 25000131 ZZH RX MED GY IP 250 OP 636 PS 637: Performed by: THORACIC SURGERY (CARDIOTHORACIC VASCULAR SURGERY)

## 2018-03-16 PROCEDURE — 25000132 ZZH RX MED GY IP 250 OP 250 PS 637: Performed by: SURGERY

## 2018-03-16 PROCEDURE — 25000125 ZZHC RX 250: Performed by: SURGERY

## 2018-03-16 PROCEDURE — 97535 SELF CARE MNGMENT TRAINING: CPT | Mod: GO | Performed by: OCCUPATIONAL THERAPIST

## 2018-03-16 PROCEDURE — 00000146 ZZHCL STATISTIC GLUCOSE BY METER IP

## 2018-03-16 PROCEDURE — 36415 COLL VENOUS BLD VENIPUNCTURE: CPT | Performed by: INTERNAL MEDICINE

## 2018-03-16 PROCEDURE — 25000131 ZZH RX MED GY IP 250 OP 636 PS 637: Performed by: SURGERY

## 2018-03-16 PROCEDURE — 84100 ASSAY OF PHOSPHORUS: CPT | Performed by: INTERNAL MEDICINE

## 2018-03-16 PROCEDURE — 25000132 ZZH RX MED GY IP 250 OP 250 PS 637: Performed by: PHYSICIAN ASSISTANT

## 2018-03-16 PROCEDURE — 80048 BASIC METABOLIC PNL TOTAL CA: CPT | Performed by: INTERNAL MEDICINE

## 2018-03-16 PROCEDURE — 85610 PROTHROMBIN TIME: CPT | Performed by: INTERNAL MEDICINE

## 2018-03-16 PROCEDURE — 12000006 ZZH R&B IMCU INTERMEDIATE UMMC

## 2018-03-16 PROCEDURE — 97110 THERAPEUTIC EXERCISES: CPT | Mod: GO | Performed by: OCCUPATIONAL THERAPIST

## 2018-03-16 PROCEDURE — 25000128 H RX IP 250 OP 636: Performed by: SURGERY

## 2018-03-16 PROCEDURE — 83735 ASSAY OF MAGNESIUM: CPT | Performed by: INTERNAL MEDICINE

## 2018-03-16 PROCEDURE — 25000132 ZZH RX MED GY IP 250 OP 250 PS 637: Performed by: THORACIC SURGERY (CARDIOTHORACIC VASCULAR SURGERY)

## 2018-03-16 PROCEDURE — 85027 COMPLETE CBC AUTOMATED: CPT | Performed by: INTERNAL MEDICINE

## 2018-03-16 PROCEDURE — 25000125 ZZHC RX 250: Performed by: STUDENT IN AN ORGANIZED HEALTH CARE EDUCATION/TRAINING PROGRAM

## 2018-03-16 RX ORDER — VALGANCICLOVIR HYDROCHLORIDE 50 MG/ML
900 POWDER, FOR SOLUTION ORAL DAILY
Status: DISCONTINUED | OUTPATIENT
Start: 2018-03-17 | End: 2018-03-16

## 2018-03-16 RX ORDER — VALGANCICLOVIR 450 MG/1
900 TABLET, FILM COATED ORAL DAILY
Status: DISCONTINUED | OUTPATIENT
Start: 2018-03-17 | End: 2018-03-24 | Stop reason: HOSPADM

## 2018-03-16 RX ORDER — TACROLIMUS 1 MG/1
4 CAPSULE ORAL
Status: DISCONTINUED | OUTPATIENT
Start: 2018-03-16 | End: 2018-03-17

## 2018-03-16 RX ORDER — PANTOPRAZOLE SODIUM 40 MG/1
40 TABLET, DELAYED RELEASE ORAL EVERY MORNING
Status: DISCONTINUED | OUTPATIENT
Start: 2018-03-17 | End: 2018-03-24 | Stop reason: HOSPADM

## 2018-03-16 RX ORDER — WARFARIN SODIUM 4 MG/1
8 TABLET ORAL
Status: COMPLETED | OUTPATIENT
Start: 2018-03-16 | End: 2018-03-16

## 2018-03-16 RX ADMIN — SILDENAFIL CITRATE 10 MG: 10 POWDER, FOR SUSPENSION ORAL at 21:38

## 2018-03-16 RX ADMIN — SULFAMETHOXAZOLE AND TRIMETHOPRIM 1 TABLET: 400; 80 TABLET ORAL at 08:37

## 2018-03-16 RX ADMIN — MULTIVITAMIN 15 ML: LIQUID ORAL at 08:37

## 2018-03-16 RX ADMIN — POTASSIUM PHOSPHATE, MONOBASIC AND POTASSIUM PHOSPHATE, DIBASIC 15 MMOL: 224; 236 INJECTION, SOLUTION INTRAVENOUS at 11:00

## 2018-03-16 RX ADMIN — Medication 4 MG: at 08:36

## 2018-03-16 RX ADMIN — SILDENAFIL CITRATE 10 MG: 10 POWDER, FOR SUSPENSION ORAL at 04:27

## 2018-03-16 RX ADMIN — PREDNISONE 12 MG: 1 TABLET ORAL at 18:29

## 2018-03-16 RX ADMIN — PANTOPRAZOLE SODIUM 40 MG: 40 TABLET, DELAYED RELEASE ORAL at 08:37

## 2018-03-16 RX ADMIN — METOPROLOL TARTRATE 25 MG: 25 TABLET, FILM COATED ORAL at 00:10

## 2018-03-16 RX ADMIN — WARFARIN SODIUM 8 MG: 4 TABLET ORAL at 18:29

## 2018-03-16 RX ADMIN — LEVALBUTEROL TARTRATE 2 PUFF: 45 AEROSOL, METERED ORAL at 21:37

## 2018-03-16 RX ADMIN — NYSTATIN 1000000 UNITS: 500000 SUSPENSION ORAL at 16:02

## 2018-03-16 RX ADMIN — ENOXAPARIN SODIUM 60 MG: 60 INJECTION SUBCUTANEOUS at 13:18

## 2018-03-16 RX ADMIN — LEVALBUTEROL TARTRATE 2 PUFF: 45 AEROSOL, METERED ORAL at 08:37

## 2018-03-16 RX ADMIN — SILDENAFIL CITRATE 10 MG: 10 POWDER, FOR SUSPENSION ORAL at 13:18

## 2018-03-16 RX ADMIN — INSULIN ASPART 2 UNITS: 100 INJECTION, SOLUTION INTRAVENOUS; SUBCUTANEOUS at 16:38

## 2018-03-16 RX ADMIN — LEVALBUTEROL TARTRATE 2 PUFF: 45 AEROSOL, METERED ORAL at 16:06

## 2018-03-16 RX ADMIN — BUMETANIDE 1 MG: 1 TABLET ORAL at 08:35

## 2018-03-16 RX ADMIN — NYSTATIN 1000000 UNITS: 500000 SUSPENSION ORAL at 21:37

## 2018-03-16 RX ADMIN — Medication 2 G: at 08:38

## 2018-03-16 RX ADMIN — NYSTATIN 1000000 UNITS: 500000 SUSPENSION ORAL at 13:18

## 2018-03-16 RX ADMIN — LEVALBUTEROL TARTRATE 2 PUFF: 45 AEROSOL, METERED ORAL at 13:18

## 2018-03-16 RX ADMIN — INSULIN ASPART 2 UNITS: 100 INJECTION, SOLUTION INTRAVENOUS; SUBCUTANEOUS at 10:16

## 2018-03-16 RX ADMIN — MYCOPHENOLATE MOFETIL 1500 MG: 200 POWDER, FOR SUSPENSION ORAL at 08:36

## 2018-03-16 RX ADMIN — LEVALBUTEROL TARTRATE 2 PUFF: 45 AEROSOL, METERED ORAL at 00:10

## 2018-03-16 RX ADMIN — QUETIAPINE 50 MG: 50 TABLET ORAL at 21:38

## 2018-03-16 RX ADMIN — VALGANCICLOVIR HYDROCHLORIDE 450 MG: 50 POWDER, FOR SOLUTION ORAL at 08:37

## 2018-03-16 RX ADMIN — LEVALBUTEROL TARTRATE 2 PUFF: 45 AEROSOL, METERED ORAL at 04:27

## 2018-03-16 RX ADMIN — TACROLIMUS 4 MG: 1 CAPSULE ORAL at 18:29

## 2018-03-16 RX ADMIN — METOPROLOL TARTRATE 25 MG: 25 TABLET, FILM COATED ORAL at 13:18

## 2018-03-16 RX ADMIN — MYCOPHENOLATE MOFETIL 1500 MG: 250 CAPSULE ORAL at 18:29

## 2018-03-16 RX ADMIN — NYSTATIN 1000000 UNITS: 500000 SUSPENSION ORAL at 08:37

## 2018-03-16 RX ADMIN — PREDNISONE 13 MG: 1 TABLET ORAL at 08:35

## 2018-03-16 ASSESSMENT — ACTIVITIES OF DAILY LIVING (ADL)
ADLS_ACUITY_SCORE: 15

## 2018-03-16 NOTE — PLAN OF CARE
Problem: Patient Care Overview  Goal: Plan of Care/Patient Progress Review  Discharge Planner OT   Patient plan for discharge: rehab  Current status: pt. Min/CGA with BADls and functional mobility.  Pt. limited by decreased activity tolerance, motivated to increase strength/activity tolerance to return home after rehab stay.  o2 98% on RA after activity.  Barriers to return to prior living situation: limited activity elland., medical status, post op prec.  Recommendations for discharge: ARU  Rationale for recommendations: to increase activity tolerance/strength to max. safety/indep. with ADls/IADls and functional mobility.       Entered by: Caro Cárdenas 03/16/2018 10:34 AM

## 2018-03-16 NOTE — PLAN OF CARE
"/90 (BP Location: Right arm)  Pulse 106  Temp 97.8  F (36.6  C) (Oral)  Resp 30  Ht 1.6 m (5' 3\")  Wt 60.8 kg (134 lb 0.6 oz)  SpO2 94%  BMI 23.74 kg/m2    VS: VSS, afebrile  Neuro: A&Ox4.   Cardiac: ST  Respiratory: Sating 98% on RA, tachypniec with RR is mid 20's  GI: -  : Adequate urine output.   IV Access: PIV x2 - phos 15 mmol should be done infusing soon. Then SL'd  Drains/tubes: NJT - cycled TF from 6p-8a. Suspension meds through NJ. Pt ok to swallow small pills  Diet/appetite: Regular diet - strict yury counts  Activity:  Ax1  Pain: denies.   Skin: midsternal incision healing and JOSHUA, old 5 chest tubes (all DC'd) all healing and JOSHUA  Mepilex on coccyx, lymph wraps in place (were taken off for 1 hr to allow break and skin assessment)  Likely will DC on Monday to TCU/ARU.    Labs: Heparin gtt DC'd today (swtiched to lovenox)  Mg replaced with 2g  Phos replaced with 15 mmol    R: Continue with POC. Notify primary team with changes.    "

## 2018-03-16 NOTE — PROGRESS NOTES
Diabetes Consult Daily  Progress Note          Assessment/Plan:     Ms. Kecia Blue is a 57-year-old woman with a history of ILD and pulmonary hypertension, dermatomyositis, RA, and Raynauds syndrome, who is admitted for emergent lung transplant workup on 2/21 for increased shortness of breath and hypoxia, required [facility] ECMO for R heart failure, now s/p bilateral sequential lung transplant on 3/1.  No history of diabetes.  Kecia is experiencing hyperglycemia secondary to enteral feeds and steroids.     Glucose ran lower overnight: low to mid 100s even with getting Kphos in D5W 250ml at start of cycled TFs.  BG dropped to 65 after TFs stopped.    Plan:  - NPH: decreased from 35 to 30 units at start of TF cycle  -continue meal aspart 1unit/15g CHO with meals and snacks.  -Correction aspart: high intensity at 1800, 2200, 0400, medium intensity before bfast and before lunch.  -monitor glucoses ac, hs and 0400.       Outpatient diabetes follow up: tbd  Plan discussed with patient, bedside RN  ADDENDUM: TF rate will decrease from 70ml/h to 50ml/h (duration will remain at 14h), total carb reducing from 172g to 123g CHO.  Will decrease NPH dose from 30 units to 20 units to accommodate change.           Interval History:   The last 24 hours progress and nursing notes reviewed.  Cycled Nutren 70 cc/h [id number].  Prednisone adjusted to 13 + 12 on 3/15, then taper next on 3/22  Creatinine continues to improve- down to 1.03 today.  Kecia is feeling like things are progressing.  Appetite is coming back a little (food is starting to taste better) and she feels like po intake is improving.  Therapies are going well.  She is having some hot flashes during the night- seem to be transient.   BG down to 65 1.5 hrs after cycle TF stopped at 8am (scheduled off time).  No interruptions in TFs overnight per RN and notes.    Expecting TCU v ARU at discharge (aiming for early next week).        Recent Labs  Lab  "03/16/18  1013 03/16/18  0938 03/16/18  0639 03/16/18  0430 03/15/18  2146 03/15/18  1800 03/15/18  1343  03/15/18  0440  03/14/18  0640  03/13/18  0455  03/12/18  0633  03/11/18  0555   GLC  --   --  127*  --   --   --   --   --  199*  --  152*  --  194*  --  184*  --  138*   * 65*  --  144* 134* 93 109*  < > 192*  < >  --   < >  --   < >  --   < >  --    < > = values in this interval not displayed.            Review of Systems:   See interval hx          Medications:       Active Diet Order      Regular Diet Adult     Physical Exam:  Gen: up in chair in NAD , bright smile  HEENT: NC/AT, mucous membranes are moist, NJ in place.  Resp: Unlabored at rest  Neuro:oriented x3, communicating clearly  BP (!) (P) 127/93 (BP Location: Right arm)  Pulse 106  Temp (P) 97.8  F (36.6  C) (Oral)  Resp (P) 30  Ht 1.6 m (5' 3\")  Wt 60.8 kg (134 lb 0.6 oz)  SpO2 94%  BMI 23.74 kg/m2           Data:     Lab Results   Component Value Date    A1C 5.5 03/01/2018    A1C Canceled, Test credited 03/01/2018    A1C 5.3 02/27/2018            Recent Labs   Lab Test  03/16/18   0639  03/15/18   0440   NA  142  139   POTASSIUM  4.7  4.3   CHLORIDE  109  106   CO2  23  23   ANIONGAP  10  10   GLC  127*  199*   BUN  38*  38*   CR  1.03  1.13*   CHELSEY  8.5  7.8*     CBC RESULTS:   Recent Labs   Lab Test  03/16/18   0639   WBC  9.2   RBC  2.94*   HGB  9.0*   HCT  29.6*   MCV  101*   MCH  30.6   MCHC  30.4*   RDW  20.5*   PLT  215     Kaitlynn Chin PA-C [phone]  Diabetes Management job code [id number]          "

## 2018-03-16 NOTE — PLAN OF CARE
Problem: Patient Care Overview  Goal: Plan of Care/Patient Progress Review  Edema 6B: Recommend continued use of size F comperm compression stockings to B LEs for further management of LE edema and protection of skin integrity. Compression stockings to be worn 23/24 hours per day and removed daily for skin cares however remove completely if causing pain/numbness or become soiled - see patient care order for details. Issued extra set of stockings. Patient with no further inpatient edema needs therefore will sign off.

## 2018-03-16 NOTE — PROGRESS NOTES
Pulmonary Medicine  Cystic Fibrosis - Lung Transplant Daily Progress Note   March 16, 2018       Patient: Kecia Blue  MRN: [id number]  Transplant Date: 2/21/2018 (POD# 15)  Admission date: 2/21/2018  Hospital Day #23          Assessment and Plan:     Kecia Blue is a 58 y/o female w/PMHx significant for dermatomyositis (on immunosuppression), seronegative RA, ILD, and pulmonary hypertension who was admitted for emergent lung transplant w/u on 2/21 for increased SOB and hypoxia. Required V-A ECMO for right heart failure on 2/27. Now s/p bilateral sequential lung transplant on 3/1, ECMO decannulation on 3/3. Post-op course c/b hemodynamic instability requiring pressors and iFlolan (off, but now on PO Revatio). Extubated 3/7, sating well on RA. Making gradual progress overall.        S/p bilateral lung transplant on 3/1/18 for acute hypoxic/hypercapneic respiratory failure 2/2 ILD.  Post-op Pulm HTN 2/2 ILD.   Adequate graft function. Weaned off iFlolan, however still on PO Revatio. Extubated 3/6, sating well on RA. Minimal pulmonary symptoms. Last chest tube removed 3/13. Surveillance bronchoscopy 3/13 revealing bilateral anastomoses intact and patent, thick yellow secretions around right anastomosis, suctioned and removed.   - Diuretics as noted below  - Continue aggressive pulmonary toilet with acapella/incentive spirometry q 1hr while awake.                     Continue 3-drug immunosuppression:  - Tacrolimus 4mg BID. Target goal 10-12 (decresaed for RADHA). Recheck tacrolimus level tomorrow, ordered.   - MMF 1500 g BID   - Prednisone 13mg qAM / 12mg qPM. Steroid taper below:    Date AM PM   3/15/18 13 12.5   3/22/18 12.5 12.5   4/26/18 12.5 10   5/24/18 10 10   6/22/18 10 7.5   7/20/18 7.5 7.5   8/24/18 7.5 5   9/21/18 5 5     Prophylaxis:   PJP: Bactrim  CMV: vangancyclovir  Fungal: Nystatin        Donor Recipient Intervention   CMV status  Pos   Pos  valgancyclovir    EBV status Pos Pos Immune     HSV status N/A  Pos Immune       Infectious disease.  SIRS, resolved.  No known infectious disease history. Post-operative SIRS likely a response from multiple procedures (VA ECMO cannulation and decannulation) and transplant surgery. Has been afebrile for some time. Negative recipient bronch culture from time of transplant. Pan cultured 3/4 NGTD.   - Donor culture at [facility] grew MRSA. Received 2-week course of vancomycin.  - Donor culture at OSH grew MRSA, Strep pneumoniae, Moraxella catarrhalis, Haemophilus species. Received 2-week course of pip/tazo.      RADHA.  Volume overload.  Suspect secondary to meds and diuresis. Serum Cr+ improving nicely. LE edema likely 2/2 low albumin state. Improved with diuretics.  - Bumex PO daily  - Trend BMP daily  - Avoid nephrotoxic agents as possible  - Decrease tacro goal 10-12     Acute anemia.  Hgb slowly drifting down over the past one week. No evidence of bleeding. Likely 2/2 multiple lab draws, bone marrow suppressive medications. Received transfusion 3/15 with appropriate response.   - Trend hgb daily    Steroid-induced hyperglycemia.  Transferred out of ICU on insulin drip. Transitioned to intermittent SQ insulin. Glucose relatively well controlled.   - Appreciate Endocrine's assistance managing glucose / insulin needs  - Monitor glucose ACHS  - Hypoglycemia protocol     Dysphagia, resolved.  Noted post-transplant. SLP consulted while inpatient. Transitioned to regular diet on 3/15.    Diarrhea.  Large bowel loops on imaging.  Pain on defecation.  Diarrhea slowly improving, likely 2/2 antibiotics, immunosuppression agents and/or TFs. Pain while defecation could be anal fissure versus hemorrhoid. C diff negative. Abd XR 3/15 with decrease gaseous distention and stool burden. Overall improved.  - Desitin cream daily BID prn  - Imodium prn    Small-mod L pleural effusion, likely hemothorax.  Noted on Chest CT 3/8. Repeat CXR relatively unchanged in size. Required 1 unit  "pRBCs on 3/8. Will continue to monitor with imaging. If worsening, could consider VATS versus chest tube placement w/ lytic therapy.         LUE DVT, provoked.  Found on UE ultrasound on 3/7. Initiated heparin gtt as bridge with warfarin added on 3/12. Discontinued 3/16.  - Discontinue low-intensity heparin gtt and initiate enoxaparin treatment dose as bridge to therapeutic INR  - Warfarin, pharm to dose  - INR goal 2-3. Will need 3-months of anticoagulation.        FEN: Dysphagia diet level 3 with thin liquids and TF  PPX: Heparin gtt, warfarin, PPI   CODE: Full  DISPO: Inpatient cares. TCU early next week (~ possibly Monday).       Patient discussed with Dr. Figueroa.    Vibha Albert, APRN, CNP  Inpatient Nurse Practitioner  [facility]  Pager [phone]        Subjective & Interval History:     Last 24 hour vital signs, labs and care team notes reviewed.    Remains afebrile, hemodynamically stable. Received 1u pRBC yesterday with appropriate response. No new complaints. Breathing comfortable. Noted to have increase RR overnight,  states \"looks like her old way of breathing.\" Tachypnea resolves upon awaking.            Review of Systems:     C: no fever, no chills, no change in weight, + decrease appetite- gradually improving  INTEGUMENTARY/SKIN: no rash or obvious new lesions  ENT/MOUTH: no sore throat, no sinus pain, no nasal drainage  RESP: see interval history  CV: no chest pain, no palpitations, + peripheral edema, no orthopnea  GI: no nausea, no vomiting, no change in stools, no reflux symptoms  : no dysuria  MUSCULOSKELETAL: no myalgias, no arthralgias  ENDOCRINE: blood sugars with adequate control  NEURO: no headache, no numbness or tingling  PSYCHIATRIC: mood stable          Medications:     Active Medications:    enoxaparin  1 mg/kg Subcutaneous Q12H     [START ON 3/17/2018] pantoprazole  40 mg Oral QAM     tacrolimus  4 mg Oral BID IS     [START ON 3/17/2018] valGANciclovir  900 mg Oral Daily     " insulin isophane human  20 Units Subcutaneous Q24H     bumetanide  1 mg Oral Daily     predniSONE  13 mg Oral Daily     predniSONE  12 mg Oral QPM     multivitamins with minerals  15 mL Oral Daily     insulin aspart  1-12 Units Subcutaneous TID     insulin aspart  1-7 Units Subcutaneous BID AC     insulin aspart   Subcutaneous TID w/meals     sulfamethoxazole-trimethoprim  1 tablet Oral or NG Tube Daily     metoprolol tartrate  25 mg Oral BID     QUEtiapine  50 mg Oral At Bedtime     levalbuterol  2 puff Inhalation Q4H JULES     sildenafil  10 mg Oral or Feeding Tube Q8H     nystatin  1,000,000 Units Swish & Swallow 4x Daily     mycophenolate  1,500 mg Oral BID IS     Active PRN Medications:  polyethylene glycol, Warfarin Therapy Reminder, simethicone, HOLD MEDICATION, zinc oxide, insulin aspart, sennosides, potassium phosphate (KPHOS) in D5W IV, potassium phosphate (KPHOS) in D5W IV, potassium phosphate (KPHOS) in D5W IV, potassium phosphate (KPHOS) in D5W IV, potassium phosphate (KPHOS) in D5W IV, artificial tears, metoprolol, IV fluid REPLACEMENT ONLY, naloxone, acetaminophen, oxyCODONE IR, ondansetron **OR** ondansetron, IV fluid REPLACEMENT ONLY, glucose **OR** dextrose **OR** glucagon, potassium chloride, potassium chloride, potassium chloride, potassium chloride with lidocaine, potassium chloride, magnesium sulfate, magnesium sulfate         Physical Exam:     Constitutional: Awake. Alert, in no apparent distress.   HEENT: Eyes with pink conjunctivae, no scleral jaundice. Oral mucosa moist without lesions. Neck supple without lymphadenopathy.   PULM: Reduced air flow bilaterally lower lobes. Clear lung sounds anteriorly & posteriorly. No crackles, no rhonchi, no wheezes.   CV: Normal S1 and S2. RRR. No murmur, gallop, or rub. Minimal bilat ankle/pedal edema, compression stocking on. Peripheral pulses intact.   ABD: NABS, soft, nontender, Not distended. No guarding.   MSK: Moves all extremities. No apparent  "muscle wasting.   NEURO: Alert and oriented x 4, conversant.   SKIN: Warm, dry. No pruritus. No rash on limited exam.   PSYCH: Mood stable, judgment and insight appropriate.?     Lines, Drains, and Devices:  Peripheral IV 03/07/18 Right;Anterior Upper forearm (Active)   Site Assessment [facility] 3/13/2018  4:00 AM   Line Status Infusing 3/13/2018  4:00 AM   Phlebitis Scale 0-->no symptoms 3/13/2018  4:00 AM   Infiltration Scale 0 3/13/2018  4:00 AM   Infiltration Site Treatment Method  None 3/13/2018  4:00 AM   Extravasation? No 3/13/2018  4:00 AM   Number of days:6       Peripheral IV 03/08/18 Right Hand (Active)   Site Assessment [facility] 3/13/2018  4:00 AM   Line Status Infusing 3/13/2018  4:00 AM   Phlebitis Scale 0-->no symptoms 3/13/2018  4:00 AM   Infiltration Scale 0 3/13/2018  4:00 AM   Infiltration Site Treatment Method  None 3/13/2018  4:00 AM   Extravasation? No 3/13/2018  4:00 AM   Number of days:5       Peripheral IV 03/10/18 Right Upper forearm (Active)   Site Assessment [facility] 3/13/2018  4:00 AM   Line Status Saline locked 3/13/2018  4:00 AM   Phlebitis Scale 0-->no symptoms 3/13/2018  4:00 AM   Infiltration Scale 0 3/13/2018  4:00 AM   Infiltration Site Treatment Method  None 3/13/2018  4:00 AM   Extravasation? No 3/13/2018  4:00 AM   Number of days:3            Data:     All vital signs, laboratory and imaging data for the past 24 hours reviewed.      Vital signs:  Temp: 97.8  F (36.6  C) Temp src: Oral BP: 116/90   Heart Rate: 106 Resp: 30 SpO2: 94 % O2 Device: None (Room air) Oxygen Delivery: 3 LPM Height: 160 cm (5' 3\") Weight: 60.8 kg (134 lb 0.6 oz)    Weight trend:   Vitals:    03/14/18 0417 03/15/18 0126 03/16/18 0514   Weight: 61 kg (134 lb 7.7 oz) 61.1 kg (134 lb 9.6 oz) 60.8 kg (134 lb 0.6 oz)        I/O:   Intake/Output Summary (Last 24 hours) at 03/13/18 1316  Last data filed at 03/13/18 0930   Gross per 24 hour   Intake             1250 ml   Output             1532 ml   Net             -282 ml "       Labs    CMP:     Recent Labs  Lab 03/16/18  0639 03/15/18  0440 03/14/18  1013 03/14/18  0640 03/13/18  0455  03/11/18  0555 03/10/18  0330    139  --  142 140  < > 140 142   POTASSIUM 4.7 4.3 4.2 5.4* 4.1  < > 3.6 3.6   CHLORIDE 109 106  --  110* 105  < > 102 104   CO2 23 23  --  18* 26  < > 29 26   ANIONGAP 10 10  --  14 10  < > 9 12   * 199*  --  152* 194*  < > 138* 132*   BUN 38* 38*  --  44* 47*  < > 49* 47*   CR 1.03 1.13*  --  1.24* 1.43*  < > 1.58* 1.51*   GFRESTIMATED 56* 50*  --  45* 38*  < > 34* 36*   GFRESTBLACK 67 60*  --  54* 46*  < > 41* 43*   CHELSEY 8.5 7.8*  --  8.2* 8.5  < > 8.2* 8.1*   MAG 1.7 2.1  --  1.6 2.1  < > 2.1 1.7   PHOS 2.3* 2.5  --  3.1 2.4*  < > 2.9 3.2   ALBUMIN  --  2.4*  --   --   --   --  2.8* 3.3*   ALT  --  22  --   --   --   --   --  22   < > = values in this interval not displayed.  CBC:     Recent Labs  Lab 03/16/18  0639 03/15/18  0440 03/14/18  1214 03/14/18  0640 03/13/18  1130   WBC 9.2 8.9  --  12.3* 15.8*   RBC 2.94* 2.25*  --  2.42* 2.82*   HGB 9.0* 6.9* 7.9* 7.3* 8.6*   HCT 29.6* 22.3*  --  23.9* 27.3*   * 99  --  99 97   MCH 30.6 30.7  --  30.2 30.5   MCHC 30.4* 30.9*  --  30.5* 31.5   RDW 20.5* 20.3*  --  19.3* 18.9*    215  --  208 260     INR:     Recent Labs  Lab 03/16/18  0639 03/15/18  0440 03/14/18  0640 03/13/18  0455   INR 1.36* 1.37* 1.03 0.99     Glucose:   Recent Labs  Lab 03/16/18  1013 03/16/18  0938 03/16/18  0639 03/16/18  0430 03/15/18  2146 03/15/18  1800 03/15/18  1343  03/15/18  0440  03/14/18  0640  03/13/18  0455  03/12/18  0633  03/11/18  0555   GLC  --   --  127*  --   --   --   --   --  199*  --  152*  --  194*  --  184*  --  138*   * 65*  --  144* 134* 93 109*  < > 192*  < >  --   < >  --   < >  --   < >  --    < > = values in this interval not displayed.  Blood Gas: No lab results found in last 7 days.  Culture Data No results for input(s): CULT in the last 168 hours.  Virology Data:   Lab Results    Component Value Date    FLUAH1 Negative 02/27/2018    FLUAH3 Negative 02/27/2018    [id number] Negative 02/27/2018    IFLUB Negative 02/27/2018    RSVA Negative 02/27/2018    RSVB Negative 02/27/2018    PIV1 Negative 02/27/2018    PIV2 Negative 02/27/2018    PIV3 Negative 02/27/2018    HMPV Negative 02/27/2018    HRVS Negative 02/27/2018    ADVBE Negative 02/27/2018    ADVC Negative 02/27/2018     Historical CMV results (last 3 of prior testing):  Lab Results   Component Value Date    CMVQNT Canceled, Test credited (A) 03/01/2018    CMVQNT Canceled, Test credited (A) 03/01/2018     Lab Results   Component Value Date    CMVLOG Canceled, Test credited 03/01/2018    CMVLOG Canceled, Test credited 03/01/2018     Urine Studies    Recent Labs   Lab Test  03/04/18   1425   URINEPH  5.5   NITRITE  Negative   LEUKEST  Negative   WBCU  5

## 2018-03-16 NOTE — PROGRESS NOTES
CLINICAL NUTRITION SERVICES - BRIEF NOTE    Pt and  report that she has been continuing to feel full from her TF and doesn't get hungry until about lunch time everyday. Her Po intakes have improved more compared to the beginning of the week. She has been having ice cream, avel food cake with strawberries, a half of a hamburger, fruit, ect.   Omar counts:   3/13: 576 kcals, 37 g pro  3/14: 568 kcals, 17 g pro  3/15: 673 kcals, 31 g pro    Current EN: 3/12-___: Nutren 1.5 @ 14 hours as follows (6pm-8am): Nutren 1.5 @ 70 ml/hr x 14 hours, 980ml, 1470 kcals (29), 67 g pro (1.3). This regimen provides 100% of lower end estimated needs.     INTERVENTIONS  Recommendations / Nutrition Prescription  Cont to monitor ability to wean TF further.   Recommend that pt needs to meet at least 75% of lower end need x 3 days to be able to d/c TF.     Implementation  RD to switch supplements to Boost Shakes chocolate BID  RD spoke with Endo about decreasing cycled TF as follows: Nutren 1.5 @ 50ml/hr x 14 hours to provide, 700ml, 1050 kcals (72%), 48 g pro (75%), 123 g CHO.  RD to order omar counts      Natalia Gutierrez RD, MS, LD  6B- Pager: [phone]

## 2018-03-16 NOTE — PLAN OF CARE
Problem: Patient Care Overview  Goal: Plan of Care/Patient Progress Review  Outcome: Improving  Neuro: A&Ox4. No numbness/tingling.   Cardiac: ST HR 100s. SBP 100s-120s.  Respiratory: Sating 93% on RA. No SOB, RR increased from 24-30. Pulm cross-cover aware. Lungs clear.  GI/: Adequate urine output, BM x2 per commode. No N/V.   Diet/appetite: Tolerating dysphagia diet with TF cycled from 6p-8am at 70/hr with 30ml H20 flushes Q4, with meds through [place].   Activity:  Assist of 1 to commode.   Pain: denies pain.  Skin: No new deficits noted. Mid sternal incision and upper abd/chest tube sites all JOSHUA and scabbed w/o drainage.     Plan: Continue with POC. Notify primary team with changes.

## 2018-03-16 NOTE — PROVIDER NOTIFICATION
Pulm cross-cover notified that pt RR increasing to high 20s-30 max. Just gave scheduled xopenex inhaler, pt states doesn't feel SOB, clear lung sounds. 94% on RA. No new orders.

## 2018-03-16 NOTE — PROVIDER NOTIFICATION
Pulm cross-cover MD notified that pt RR increased to 28-30, earlier in evening pt had increased in RR which was reduced with scheduled xopenex inhaler, but since 0400 assessment/inhaler pt RR continues to be high at 28-30. Pt states is not SOB, other VSS, on RA sats are 94%. No new orders per MD.

## 2018-03-16 NOTE — PLAN OF CARE
Problem: Patient Care Overview  Goal: Plan of Care/Patient Progress Review    SLP 6B: Pt just finished breakfast at AM attempt and working with OT. Pt refusing PO trials following OT session. SLP to follow per POC

## 2018-03-16 NOTE — PROGRESS NOTES
Calorie Counts    Intake recorded for: 3/15 Kcals: 673 Protein: 31g    # Meals Recorded: 100% 2 ice cream, 1% milk, 50% summ[employer] swiss hamburger    # Supplements Recorded: 0

## 2018-03-16 NOTE — PLAN OF CARE
Problem: Patient Care Overview  Goal: Plan of Care/Patient Progress Review  PT 6B:  Cancel, pt with other providers at time of attempt and therapist unable to return.  Rescheduled per POC

## 2018-03-17 ENCOUNTER — APPOINTMENT (OUTPATIENT)
Dept: SPEECH THERAPY | Facility: CLINIC | Age: 58
DRG: 003 | End: 2018-03-17
Attending: INTERNAL MEDICINE
Payer: COMMERCIAL

## 2018-03-17 ENCOUNTER — APPOINTMENT (OUTPATIENT)
Dept: OCCUPATIONAL THERAPY | Facility: CLINIC | Age: 58
DRG: 003 | End: 2018-03-17
Attending: INTERNAL MEDICINE
Payer: COMMERCIAL

## 2018-03-17 LAB
ALBUMIN SERPL-MCNC: 2.4 G/DL (ref 3.4–5)
ALT SERPL W P-5'-P-CCNC: 22 U/L (ref 0–50)
ANION GAP SERPL CALCULATED.3IONS-SCNC: 9 MMOL/L (ref 3–14)
BUN SERPL-MCNC: 33 MG/DL (ref 7–30)
CALCIUM SERPL-MCNC: 8.7 MG/DL (ref 8.5–10.1)
CHLORIDE SERPL-SCNC: 110 MMOL/L (ref 94–109)
CO2 SERPL-SCNC: 24 MMOL/L (ref 20–32)
CREAT SERPL-MCNC: 0.93 MG/DL (ref 0.52–1.04)
ERYTHROCYTE [DISTWIDTH] IN BLOOD BY AUTOMATED COUNT: 21 % (ref 10–15)
GFR SERPL CREATININE-BSD FRML MDRD: 63 ML/MIN/1.7M2
GLUCOSE BLDC GLUCOMTR-MCNC: 105 MG/DL (ref 70–99)
GLUCOSE BLDC GLUCOMTR-MCNC: 109 MG/DL (ref 70–99)
GLUCOSE BLDC GLUCOMTR-MCNC: 122 MG/DL (ref 70–99)
GLUCOSE BLDC GLUCOMTR-MCNC: 138 MG/DL (ref 70–99)
GLUCOSE BLDC GLUCOMTR-MCNC: 151 MG/DL (ref 70–99)
GLUCOSE BLDC GLUCOMTR-MCNC: 69 MG/DL (ref 70–99)
GLUCOSE BLDC GLUCOMTR-MCNC: 98 MG/DL (ref 70–99)
GLUCOSE SERPL-MCNC: 125 MG/DL (ref 70–99)
HCT VFR BLD AUTO: 31 % (ref 35–47)
HGB BLD-MCNC: 9.4 G/DL (ref 11.7–15.7)
INR PPP: 1.31 (ref 0.86–1.14)
MAGNESIUM SERPL-MCNC: 1.7 MG/DL (ref 1.6–2.3)
MCH RBC QN AUTO: 30.5 PG (ref 26.5–33)
MCHC RBC AUTO-ENTMCNC: 30.3 G/DL (ref 31.5–36.5)
MCV RBC AUTO: 101 FL (ref 78–100)
PHOSPHATE SERPL-MCNC: 3 MG/DL (ref 2.5–4.5)
PLATELET # BLD AUTO: 212 10E9/L (ref 150–450)
POTASSIUM SERPL-SCNC: 4.7 MMOL/L (ref 3.4–5.3)
RBC # BLD AUTO: 3.08 10E12/L (ref 3.8–5.2)
SODIUM SERPL-SCNC: 142 MMOL/L (ref 133–144)
TACROLIMUS BLD-MCNC: 7.5 UG/L (ref 5–15)
TME LAST DOSE: NORMAL H
WBC # BLD AUTO: 8.4 10E9/L (ref 4–11)

## 2018-03-17 PROCEDURE — 80197 ASSAY OF TACROLIMUS: CPT | Performed by: SURGERY

## 2018-03-17 PROCEDURE — 00000146 ZZHCL STATISTIC GLUCOSE BY METER IP

## 2018-03-17 PROCEDURE — 25000132 ZZH RX MED GY IP 250 OP 250 PS 637: Performed by: PHYSICIAN ASSISTANT

## 2018-03-17 PROCEDURE — 25000128 H RX IP 250 OP 636: Performed by: SURGERY

## 2018-03-17 PROCEDURE — 25000132 ZZH RX MED GY IP 250 OP 250 PS 637: Performed by: THORACIC SURGERY (CARDIOTHORACIC VASCULAR SURGERY)

## 2018-03-17 PROCEDURE — 85610 PROTHROMBIN TIME: CPT | Performed by: SURGERY

## 2018-03-17 PROCEDURE — 25000132 ZZH RX MED GY IP 250 OP 250 PS 637: Performed by: SURGERY

## 2018-03-17 PROCEDURE — 92526 ORAL FUNCTION THERAPY: CPT | Mod: GN

## 2018-03-17 PROCEDURE — 25000125 ZZHC RX 250: Performed by: SURGERY

## 2018-03-17 PROCEDURE — 12000006 ZZH R&B IMCU INTERMEDIATE UMMC

## 2018-03-17 PROCEDURE — 83735 ASSAY OF MAGNESIUM: CPT | Performed by: INTERNAL MEDICINE

## 2018-03-17 PROCEDURE — 40000133 ZZH STATISTIC OT WARD VISIT

## 2018-03-17 PROCEDURE — 25000132 ZZH RX MED GY IP 250 OP 250 PS 637

## 2018-03-17 PROCEDURE — 25000132 ZZH RX MED GY IP 250 OP 250 PS 637: Performed by: ANESTHESIOLOGY

## 2018-03-17 PROCEDURE — 40000225 ZZH STATISTIC SLP WARD VISIT

## 2018-03-17 PROCEDURE — 36415 COLL VENOUS BLD VENIPUNCTURE: CPT | Performed by: INTERNAL MEDICINE

## 2018-03-17 PROCEDURE — 80069 RENAL FUNCTION PANEL: CPT | Performed by: INTERNAL MEDICINE

## 2018-03-17 PROCEDURE — 25000131 ZZH RX MED GY IP 250 OP 636 PS 637: Performed by: THORACIC SURGERY (CARDIOTHORACIC VASCULAR SURGERY)

## 2018-03-17 PROCEDURE — 25000131 ZZH RX MED GY IP 250 OP 636 PS 637: Performed by: SURGERY

## 2018-03-17 PROCEDURE — 97535 SELF CARE MNGMENT TRAINING: CPT | Mod: GO

## 2018-03-17 PROCEDURE — 85027 COMPLETE CBC AUTOMATED: CPT | Performed by: INTERNAL MEDICINE

## 2018-03-17 PROCEDURE — 97110 THERAPEUTIC EXERCISES: CPT | Mod: GO

## 2018-03-17 PROCEDURE — 25000131 ZZH RX MED GY IP 250 OP 636 PS 637: Performed by: INTERNAL MEDICINE

## 2018-03-17 PROCEDURE — 84460 ALANINE AMINO (ALT) (SGPT): CPT | Performed by: INTERNAL MEDICINE

## 2018-03-17 RX ORDER — WARFARIN SODIUM 5 MG/1
10 TABLET ORAL
Status: COMPLETED | OUTPATIENT
Start: 2018-03-17 | End: 2018-03-17

## 2018-03-17 RX ORDER — TACROLIMUS 5 MG/1
5 CAPSULE ORAL
Status: DISCONTINUED | OUTPATIENT
Start: 2018-03-17 | End: 2018-03-19

## 2018-03-17 RX ADMIN — SILDENAFIL CITRATE 10 MG: 10 POWDER, FOR SUSPENSION ORAL at 12:54

## 2018-03-17 RX ADMIN — LEVALBUTEROL TARTRATE 2 PUFF: 45 AEROSOL, METERED ORAL at 09:10

## 2018-03-17 RX ADMIN — PREDNISONE 13 MG: 1 TABLET ORAL at 09:09

## 2018-03-17 RX ADMIN — SILDENAFIL CITRATE 10 MG: 10 POWDER, FOR SUSPENSION ORAL at 04:18

## 2018-03-17 RX ADMIN — VALGANCICLOVIR 900 MG: 450 TABLET, FILM COATED ORAL at 09:08

## 2018-03-17 RX ADMIN — MULTIVITAMIN 15 ML: LIQUID ORAL at 09:09

## 2018-03-17 RX ADMIN — PANTOPRAZOLE SODIUM 40 MG: 40 TABLET, DELAYED RELEASE ORAL at 09:10

## 2018-03-17 RX ADMIN — SILDENAFIL CITRATE 10 MG: 10 POWDER, FOR SUSPENSION ORAL at 21:02

## 2018-03-17 RX ADMIN — MYCOPHENOLATE MOFETIL 1500 MG: 250 CAPSULE ORAL at 18:33

## 2018-03-17 RX ADMIN — LEVALBUTEROL TARTRATE 2 PUFF: 45 AEROSOL, METERED ORAL at 04:20

## 2018-03-17 RX ADMIN — BUMETANIDE 1 MG: 1 TABLET ORAL at 09:09

## 2018-03-17 RX ADMIN — MYCOPHENOLATE MOFETIL 1500 MG: 250 CAPSULE ORAL at 09:08

## 2018-03-17 RX ADMIN — WARFARIN SODIUM 10 MG: 5 TABLET ORAL at 18:32

## 2018-03-17 RX ADMIN — ENOXAPARIN SODIUM 60 MG: 60 INJECTION SUBCUTANEOUS at 12:58

## 2018-03-17 RX ADMIN — TACROLIMUS 5 MG: 5 CAPSULE ORAL at 18:32

## 2018-03-17 RX ADMIN — LEVALBUTEROL TARTRATE 2 PUFF: 45 AEROSOL, METERED ORAL at 12:54

## 2018-03-17 RX ADMIN — NYSTATIN 1000000 UNITS: 500000 SUSPENSION ORAL at 21:03

## 2018-03-17 RX ADMIN — TACROLIMUS 4 MG: 1 CAPSULE ORAL at 09:09

## 2018-03-17 RX ADMIN — NYSTATIN 1000000 UNITS: 500000 SUSPENSION ORAL at 16:05

## 2018-03-17 RX ADMIN — NYSTATIN 1000000 UNITS: 500000 SUSPENSION ORAL at 12:54

## 2018-03-17 RX ADMIN — METOPROLOL TARTRATE 25 MG: 25 TABLET, FILM COATED ORAL at 12:54

## 2018-03-17 RX ADMIN — METOPROLOL TARTRATE 25 MG: 25 TABLET, FILM COATED ORAL at 01:03

## 2018-03-17 RX ADMIN — ENOXAPARIN SODIUM 60 MG: 60 INJECTION SUBCUTANEOUS at 01:01

## 2018-03-17 RX ADMIN — QUETIAPINE 50 MG: 50 TABLET ORAL at 21:02

## 2018-03-17 RX ADMIN — SULFAMETHOXAZOLE AND TRIMETHOPRIM 1 TABLET: 400; 80 TABLET ORAL at 09:08

## 2018-03-17 RX ADMIN — NYSTATIN 1000000 UNITS: 500000 SUSPENSION ORAL at 09:10

## 2018-03-17 RX ADMIN — LEVALBUTEROL TARTRATE 2 PUFF: 45 AEROSOL, METERED ORAL at 21:03

## 2018-03-17 RX ADMIN — PREDNISONE 12 MG: 1 TABLET ORAL at 18:32

## 2018-03-17 RX ADMIN — LEVALBUTEROL TARTRATE 2 PUFF: 45 AEROSOL, METERED ORAL at 16:06

## 2018-03-17 ASSESSMENT — ACTIVITIES OF DAILY LIVING (ADL)
ADLS_ACUITY_SCORE: 15

## 2018-03-17 NOTE — PLAN OF CARE
Problem: Patient Care Overview  Goal: Plan of Care/Patient Progress Review  Discharge Planner SLP   Patient plan for discharge: not stated  Current status: The patient is demonstrating good tolerance of a regular texture diet and thin liquids, and is ok for whole pills. Overall appetite is low, patient is consuming food from home to boost intake amount. SLP is signing off as goal has been met.  Barriers to return to prior living situation: none per SLP  Recommendations for discharge: defer to OT & PT  Rationale for recommendations: SLP goal met       Entered by: Brooke Mack 03/17/2018 1:49 PM     Speech Language Therapy Discharge Summary    Reason for therapy discharge:    All goals and outcomes met, no further needs identified.    Progress towards therapy goal(s). See goals on Care Plan in [facility] electronic health record for goal details.  Goals met    Therapy recommendation(s):    No further therapy is recommended.

## 2018-03-17 NOTE — PLAN OF CARE
Problem: Patient Care Overview  Goal: Plan of Care/Patient Progress Review  Discharge Planner OT   Patient plan for discharge: ARU  Current status: Pt required Min A and vc's for transfer supine<->seated EOB and sit<->standing with lung pillow. Pt required CGA/Min A and vc's to doff socks and don shoes within precautions. Pt ambulated 1x 100ft, and 2x 75ft with CGA, vc's, FWW and WC follow. Pt tolerated this activity well with long seated rest breaks. VSS.   Barriers to return to prior living situation: Decreased strength/activity tolerance, Decreased independence with functional transfers/ADLs.  Recommendations for discharge: ARU  Rationale for recommendations: Pt will benefit from continued therapy to address barriers above and maximize independence.       Entered by: Manas Boo 03/17/2018 3:28 PM

## 2018-03-17 NOTE — PROGRESS NOTES
Pulmonary Medicine  Cystic Fibrosis - Lung Transplant Daily Progress Note   March 17, 2018       Patient: Kecia Blue  MRN: [id number]  Transplant Date: 2/21/2018 (POD# 16)  Admission date: 2/21/2018  Hospital Day #24          Assessment and Plan:     Kecia Blue is a 58 y/o female w/PMHx significant for dermatomyositis (on immunosuppression), seronegative RA, ILD, and pulmonary hypertension who was admitted for emergent lung transplant w/u on 2/21 for increased SOB and hypoxia. Required V-A ECMO for right heart failure on 2/27. Now s/p bilateral sequential lung transplant on 3/1, ECMO decannulation on 3/3. Post-op course c/b hemodynamic instability requiring pressors and iFlolan (off, but now on PO Revatio). Extubated 3/7, sating well on RA. Making gradual progress overall.      Changes today 3/17/18:  - increase tacro dose to 5mg BID based on level of 7.5  - continue calorie counts  - continue PT/OT      S/p bilateral lung transplant on 3/1/18 for acute hypoxic/hypercapneic respiratory failure 2/2 ILD.  Post-op Pulm HTN 2/2 ILD.   Adequate graft function. Weaned off iFlolan, however still on PO Revatio. Extubated 3/6, sating well on RA. Minimal pulmonary symptoms. Last chest tube removed 3/13. Surveillance bronchoscopy 3/13 revealing bilateral anastomoses intact and patent, thick yellow secretions around right anastomosis, suctioned and removed.   - Diuretics as noted below  - Continue aggressive pulmonary toilet with acapella/incentive spirometry q 1hr while awake.                     Continue 3-drug immunosuppression:  - Tacrolimus with target goal 10-12 (decresaed for RADHA). Increased to 5mg BID on 3/17/18, repeat level on Monday 3/19 ordered.   - MMF 1500 g BID   - Prednisone 13mg qAM / 12mg qPM. Steroid taper below:    Date AM PM   3/15/18 13 12.5   3/22/18 12.5 12.5   4/26/18 12.5 10   5/24/18 10 10   6/22/18 10 7.5   7/20/18 7.5 7.5   8/24/18 7.5 5   9/21/18 5 5     Prophylaxis:   PJP:  Bactrim  CMV: vangancyclovir  Fungal: Nystatin        Donor Recipient Intervention   CMV status  Pos   Pos  valgancyclovir    EBV status Pos Pos Immune    HSV status N/A  Pos Immune       Infectious disease.  SIRS, resolved.  No known infectious disease history. Post-operative SIRS likely a response from multiple procedures (VA ECMO cannulation and decannulation) and transplant surgery. Has been afebrile for some time. Negative recipient bronch culture from time of transplant. Pan cultured 3/4 NGTD.   - Donor culture at [facility] grew MRSA. Received 2-week course of vancomycin.  - Donor culture at OSH grew MRSA, Strep pneumoniae, Moraxella catarrhalis, Haemophilus species. Received 2-week course of pip/tazo.      RADHA.  Volume overload.  Suspect secondary to meds and diuresis. Serum Cr+ improving nicely. LE edema likely 2/2 low albumin state. Improved with diuretics.  - Bumex PO daily  - Trend BMP daily  - Avoid nephrotoxic agents as possible  - Decrease tacro goal 10-12     Acute anemia.  Hgb slowly drifting down over the past one week. No evidence of bleeding. Likely 2/2 multiple lab draws, bone marrow suppressive medications. Received transfusion 3/15 with appropriate response.   - Trend hgb daily    Steroid-induced hyperglycemia.  Transferred out of ICU on insulin drip. Transitioned to intermittent SQ insulin. Glucose relatively well controlled.   - Appreciate Endocrine's assistance managing glucose / insulin needs  - Monitor glucose ACHS  - Hypoglycemia protocol     Dysphagia, resolved.  Noted post-transplant. SLP consulted while inpatient. Transitioned to regular diet on 3/15.    Diarrhea.  Large bowel loops on imaging.  Pain on defecation.  Diarrhea slowly improving, likely 2/2 antibiotics, immunosuppression agents and/or TFs. Pain while defecation could be anal fissure versus hemorrhoid. C diff negative. Abd XR 3/15 with decrease gaseous distention and stool burden. Overall improved.  - Desitin cream daily BID  prn  - Imodium prn    Small-mod L pleural effusion, likely hemothorax.  Noted on Chest CT 3/8. Repeat CXR relatively unchanged in size. Required 1 unit pRBCs on 3/8. Will continue to monitor with imaging. If worsening, could consider VATS versus chest tube placement w/ lytic therapy.         LUE DVT, provoked.  Found on UE ultrasound on 3/7. Initiated heparin gtt as bridge with warfarin added on 3/12. Discontinued 3/16.  - Discontinue low-intensity heparin gtt and initiate enoxaparin treatment dose as bridge to therapeutic INR  - Warfarin, pharm to dose  - INR goal 2-3. Will need 3-months of anticoagulation.        FEN: Dysphagia diet level 3 with thin liquids and TF  PPX: Heparin gtt, warfarin, PPI   CODE: Full  DISPO: Inpatient cares. TCU early next week (~ possibly Monday).       Patient seen and discussed with Dr. Sana Land MD  Pulmonary and Critical Care Fellow  Pager [phone]          Subjective & Interval History:     Last 24 hour vital signs, labs and care team notes reviewed.    No acute issues overnight. Only ~650 cals on yesterday's count, patient's  reports improved appetite this AM.  Attempting to swallow pills for the first time today- took some time but went OK.  No shortness of breath, rare dry cough, and no wheezing.  No fevers/sweats/chills overnight.  Patient and  feeling optimistic.            Review of Systems:     C: no fever, no chills, no change in weight, + decrease appetite- gradually improving  INTEGUMENTARY/SKIN: no rash or obvious new lesions  ENT/MOUTH: no sore throat, no sinus pain, no nasal drainage  RESP: see interval history  CV: no chest pain, no palpitations, , no orthopnea  GI: no nausea, no vomiting, no change in stools, no reflux symptoms  : no dysuria  MUSCULOSKELETAL: no myalgias, no arthralgias  ENDOCRINE: blood sugars with adequate control  NEURO: no headache, no numbness or tingling  PSYCHIATRIC: mood stable          Medications:      Active Medications:    insulin isophane human  15 Units Subcutaneous Q24H     enoxaparin  1 mg/kg Subcutaneous Q12H     pantoprazole  40 mg Oral QAM     tacrolimus  4 mg Oral BID IS     valGANciclovir  900 mg Oral Daily     bumetanide  1 mg Oral Daily     predniSONE  13 mg Oral Daily     predniSONE  12 mg Oral QPM     multivitamins with minerals  15 mL Oral Daily     insulin aspart  1-12 Units Subcutaneous TID     insulin aspart  1-7 Units Subcutaneous BID AC     insulin aspart   Subcutaneous TID w/meals     sulfamethoxazole-trimethoprim  1 tablet Oral or NG Tube Daily     metoprolol tartrate  25 mg Oral BID     QUEtiapine  50 mg Oral At Bedtime     levalbuterol  2 puff Inhalation Q4H JULES     sildenafil  10 mg Oral or Feeding Tube Q8H     nystatin  1,000,000 Units Swish & Swallow 4x Daily     mycophenolate  1,500 mg Oral BID IS     Active PRN Medications:  polyethylene glycol, Warfarin Therapy Reminder, simethicone, HOLD MEDICATION, zinc oxide, insulin aspart, sennosides, potassium phosphate (KPHOS) in D5W IV, potassium phosphate (KPHOS) in D5W IV, potassium phosphate (KPHOS) in D5W IV, potassium phosphate (KPHOS) in D5W IV, potassium phosphate (KPHOS) in D5W IV, artificial tears, metoprolol, IV fluid REPLACEMENT ONLY, naloxone, acetaminophen, oxyCODONE IR, ondansetron **OR** ondansetron, IV fluid REPLACEMENT ONLY, glucose **OR** dextrose **OR** glucagon, potassium chloride, potassium chloride, potassium chloride, potassium chloride with lidocaine, potassium chloride, magnesium sulfate, magnesium sulfate         Physical Exam:     Constitutional: Awake. Alert, in no apparent distress.   HEENT: Eyes with pink conjunctivae, no scleral jaundice. Oral mucosa moist without lesions. Neck supple without lymphadenopathy.   +NJ tube  PULM: Reduced air flow bilaterally lower lobes. Clear lung sounds anteriorly & posteriorly. No crackles, no rhonchi, no wheezes.   CV: Normal S1 and S2. RRR. No murmur, gallop, or  "rub.Compresion stockings on.  ABD: NABS, soft, nontender, Not distended. No guarding.   MSK: Moves all extremities. No apparent muscle wasting.   NEURO: Alert and oriented x 4, conversant.   SKIN: Warm, dry. No pruritus. No rash on limited exam.   PSYCH: Mood stable, judgment and insight appropriate.?     Lines, Drains, and Devices:  Peripheral IV 03/07/18 Right;Anterior Upper forearm (Active)   Site Assessment [facility] 3/13/2018  4:00 AM   Line Status Infusing 3/13/2018  4:00 AM   Phlebitis Scale 0-->no symptoms 3/13/2018  4:00 AM   Infiltration Scale 0 3/13/2018  4:00 AM   Infiltration Site Treatment Method  None 3/13/2018  4:00 AM   Extravasation? No 3/13/2018  4:00 AM   Number of days:6       Peripheral IV 03/08/18 Right Hand (Active)   Site Assessment [facility] 3/13/2018  4:00 AM   Line Status Infusing 3/13/2018  4:00 AM   Phlebitis Scale 0-->no symptoms 3/13/2018  4:00 AM   Infiltration Scale 0 3/13/2018  4:00 AM   Infiltration Site Treatment Method  None 3/13/2018  4:00 AM   Extravasation? No 3/13/2018  4:00 AM   Number of days:5       Peripheral IV 03/10/18 Right Upper forearm (Active)   Site Assessment [facility] 3/13/2018  4:00 AM   Line Status Saline locked 3/13/2018  4:00 AM   Phlebitis Scale 0-->no symptoms 3/13/2018  4:00 AM   Infiltration Scale 0 3/13/2018  4:00 AM   Infiltration Site Treatment Method  None 3/13/2018  4:00 AM   Extravasation? No 3/13/2018  4:00 AM   Number of days:3            Data:     All vital signs, laboratory and imaging data for the past 24 hours reviewed.      Vital signs:  Temp: 98  F (36.7  C) Temp src: Oral BP: 119/90   Heart Rate: 99 Resp: 26 SpO2: 99 % O2 Device: None (Room air) Oxygen Delivery: 3 LPM Height: 160 cm (5' 3\") Weight: 60.2 kg (132 lb 11.5 oz)    Weight trend:   Vitals:    03/15/18 0126 03/16/18 0514 03/17/18 0133   Weight: 61.1 kg (134 lb 9.6 oz) 60.8 kg (134 lb 0.6 oz) 60.2 kg (132 lb 11.5 oz)        I/O:     Intake/Output Summary (Last 24 hours) at 03/17/18 0748  Last data " filed at 03/17/18 0700   Gross per 24 hour   Intake             2063 ml   Output             2725 ml   Net             -662 ml       Labs    CMP:     Recent Labs  Lab 03/17/18  0604 03/16/18  0639 03/15/18  0440 03/14/18  1013 03/14/18  0640  03/11/18  0555    142 139  --  142  < > 140   POTASSIUM 4.7 4.7 4.3 4.2 5.4*  < > 3.6   CHLORIDE 110* 109 106  --  110*  < > 102   CO2 24 23 23  --  18*  < > 29   ANIONGAP 9 10 10  --  14  < > 9   * 127* 199*  --  152*  < > 138*   BUN 33* 38* 38*  --  44*  < > 49*   CR 0.93 1.03 1.13*  --  1.24*  < > 1.58*   GFRESTIMATED 63 56* 50*  --  45*  < > 34*   GFRESTBLACK 76 67 60*  --  54*  < > 41*   CHELSEY 8.7 8.5 7.8*  --  8.2*  < > 8.2*   MAG 1.7 1.7 2.1  --  1.6  < > 2.1   PHOS 3.0 2.3* 2.5  --  3.1  < > 2.9   ALBUMIN 2.4*  --  2.4*  --   --   --  2.8*   ALT 22  --  22  --   --   --   --    < > = values in this interval not displayed.  CBC:     Recent Labs  Lab 03/17/18  0604 03/16/18  0639 03/15/18  0440 03/14/18  1214 03/14/18  0640   WBC 8.4 9.2 8.9  --  12.3*   RBC 3.08* 2.94* 2.25*  --  2.42*   HGB 9.4* 9.0* 6.9* 7.9* 7.3*   HCT 31.0* 29.6* 22.3*  --  23.9*   * 101* 99  --  99   MCH 30.5 30.6 30.7  --  30.2   MCHC 30.3* 30.4* 30.9*  --  30.5*   RDW 21.0* 20.5* 20.3*  --  19.3*    215 215  --  208     INR:     Recent Labs  Lab 03/16/18  0639 03/15/18  0440 03/14/18  0640 03/13/18  0455   INR 1.36* 1.37* 1.03 0.99     Glucose:   Recent Labs  Lab 03/17/18  0608 03/17/18  0604 03/16/18  2135 03/16/18  1635 03/16/18  1315 03/16/18  1013 03/16/18  0938 03/16/18  0639  03/15/18  0440  03/14/18  0640  03/13/18  0455  03/12/18  0633   GLC  --  125*  --   --   --   --   --  127*  --  199*  --  152*  --  194*  --  184*   *  --  71 89 149* 154* 65*  --   < > 192*  < >  --   < >  --   < >  --    < > = values in this interval not displayed.  Blood Gas: No lab results found in last 7 days.  Culture Data No results for input(s): CULT in the last 168  hours.  Virology Data:   Lab Results   Component Value Date    FLUAH1 Negative 02/27/2018    FLUAH3 Negative 02/27/2018    RE5025 Negative 02/27/2018    IFLUB Negative 02/27/2018    RSVA Negative 02/27/2018    RSVB Negative 02/27/2018    PIV1 Negative 02/27/2018    PIV2 Negative 02/27/2018    PIV3 Negative 02/27/2018    HMPV Negative 02/27/2018    HRVS Negative 02/27/2018    ADVBE Negative 02/27/2018    ADVC Negative 02/27/2018     Historical CMV results (last 3 of prior testing):  Lab Results   Component Value Date    CMVQNT Canceled, Test credited (A) 03/01/2018    CMVQNT Canceled, Test credited (A) 03/01/2018     Lab Results   Component Value Date    CMVLOG Canceled, Test credited 03/01/2018    CMVLOG Canceled, Test credited 03/01/2018     Urine Studies    Recent Labs   Lab Test  03/04/18   1425   URINEPH  5.5   NITRITE  Negative   LEUKEST  Negative   WBCU  5     Pulmonary Consult Attending Note:    I saw and examined the patient w the resident/fellow. I reviewed the labs, imaging studies and cultures.    Please see the resident's/fellow's note for detailed physical exam, assessment and plan which I agree and formulated together.    Nicho Hood MD

## 2018-03-17 NOTE — PROVIDER NOTIFICATION
BG- 69 upon POC 0400 check. Pt given apple juice at that time and upon recheck . Pt asymptomatic.    Update: rechecked @ 0600 for 109.       FYI:  * Conflict with BG monitors - 1st BG stated 25, did an immediate recheck for 2nd BG 48 (these were off pt's PIV). 3rd BG checked on fingertip which resulted at 69 - accepted this as accurate.    Upon first recheck post-AJ stated pt BG 45 (via fingertip), which did not coordinate with interventions etc. Rechecked with separate BG monitor &  (via fingertip) - accepted this as accurate.     * POC lab notified about meter; sent down for evaluation.

## 2018-03-17 NOTE — PROGRESS NOTES
Diabetes Consult Daily  Progress Note          Assessment/Plan:     Ms. Kecia Blue is a 57-year-old woman with a history of ILD and pulmonary hypertension, dermatomyositis, RA, and Raynauds syndrome, who is admitted for emergent lung transplant workup on 2/21 for increased shortness of breath and hypoxia, required [facility] ECMO for R heart failure, now s/p bilateral sequential lung transplant on 3/1.  No history of diabetes.  Kecia is experiencing hyperglycemia secondary to enteral feeds and steroids.     Insulin needs continue to drop-- BG down to 80s last evening before getting NPH (following dose of meal aspart), overnight dropping to the 60s-- or possibly less- see interval hx (pt asymptomatic).  Today, larger bfast and no aspart given- bg in afternoon only 105.    Plan:  - NPH: decreased 20 to 10 units qPM at start of TF cycle  -continue meal aspart discontinued as of today.  -Correction aspart: decreased to medium intensity correction at 1800, 2200, 0400; continue medium intensity before bfast and before lunch.  -monitor glucoses ac, hs and 0400.  -Request PIV be used for glucose checks to due to Raynaud's-- if value seems inaccurate recommend using earlobes before fingertips       Outpatient diabetes follow up: tbd  Plan discussed with patient, bedside RN             Interval History:   The last 24 hours progress and nursing notes reviewed.  Cycled Nutren 70 cc/h 8867-2643.  Prednisone adjusted to 13 + 12 on 3/15, then taper next on 3/22  Creatinine continues to improve.  Kecia was very tired after therapy this morning- so sound asleep when I visited.  , Ranjan, reports that appetite does seem to be improving, especially this morning (eggs, hashbrowns, juice)- after TF rate decreased.  We decreased dose of NPH last night for lower TF rate but glucoses still dropped low overnight.  BG down to the 80s even before NPH was given. Some inconsistent BG values overnight with Rn staff  "using PIV, then fingertips for glucose checks.   Overall insulin needs seems to be decreasing-- perhaps has activity is increasing and pt is doing better overall (steroid is unchanged since 3/15).    Expecting TCU v ARU at discharge (aiming for early next week).        Recent Labs  Lab 03/17/18  1431 03/17/18  0905 03/17/18  0608 03/17/18  0604 03/17/18  0430 03/17/18  0415 03/16/18  2135  03/16/18  0639  03/15/18  0440  03/14/18  0640  03/13/18  0455  03/12/18  0633   GLC  --   --   --  125*  --   --   --   --  127*  --  199*  --  152*  --  194*  --  184*   * 98 109*  --  122* 69* 71  < >  --   < > 192*  < >  --   < >  --   < >  --    < > = values in this interval not displayed.            Review of Systems:   See interval hx          Medications:       Active Diet Order      Regular Diet Adult      Advance Diet as Tolerated     Physical Exam:  Gen: sleeping soundly, NAD.  is present and involved.  HEENT: NC/AT, mucous membranes are moist, NJ in place.  Resp: Unlabored at rest  Neuro:sleeping  BP (!) 137/96  Pulse 106  Temp 98  F (36.7  C) (Oral)  Resp 24  Ht 1.6 m (5' 3\")  Wt 60.2 kg (132 lb 11.5 oz)  SpO2 96%  BMI 23.51 kg/m2           Data:     Lab Results   Component Value Date    A1C 5.5 03/01/2018    A1C Canceled, Test credited 03/01/2018    A1C 5.3 02/27/2018            Recent Labs   Lab Test  03/17/18   0604  03/16/18   0639   NA  142  142   POTASSIUM  4.7  4.7   CHLORIDE  110*  109   CO2  24  23   ANIONGAP  9  10   GLC  125*  127*   BUN  33*  38*   CR  0.93  1.03   CHELSEY  8.7  8.5     CBC RESULTS:   Recent Labs   Lab Test  03/17/18   0604   WBC  8.4   RBC  3.08*   HGB  9.4*   HCT  31.0*   MCV  101*   MCH  30.5   MCHC  30.3*   RDW  21.0*   PLT  212       Kaitlynn Chin PA-C [phone]  Diabetes Management job code [id number]          " Attending Only

## 2018-03-17 NOTE — PROGRESS NOTES
Brief Crosscover Note    BG dropped to 71 overnight. Will lower humulin from 20 to 15 units.    Elyse Gill MD, PhD  [employer]  [phone]

## 2018-03-17 NOTE — PLAN OF CARE
"Problem: Patient Care Overview  Goal: Plan of Care/Patient Progress Review  Outcome: No Change  Care Provided: [id number]    Neuro: A&Ox4.   Cardiac: SR/Stach. /90 (BP Location: Right arm)  Pulse 106  Temp 98  F (36.7  C) (Oral)  Resp 26  Ht 1.6 m (5' 3\")  Wt 60.2 kg (132 lb 11.5 oz)  SpO2 99%  BMI 23.51 kg/m2  Respiratory: Sating % on RA - spot checking throughout night. Lungs clear/diminished; infrequent productive cough.  GI/: Adequate urine output. Loose BM X2.  Diet/appetite: Cycled TF @50cc/hr per NJ from 9313-8693. Tolerating regular diet; poor appetite.   Activity:  Assist of 1 up to commode in room.  Pain: Pt denies presence of pain; no PRNs given.  Skin: Midline incision & lap sites approximated; JOSHUA. Groin site WDL. Coccyx purplish/nonblanchable redness - mepilex placed for padding. Lymph wraps on BLEs.  Lab: See provider notification regarding 69BG @ 0410- 240cc AJ given PO; recheck 122.    R: Will continue to monitor pt closely and notify primary team with any changes.      Problem: Chronic Respiratory Difficulty Comorbidity  Goal: Chronic Respiratory Difficulty  Patient comorbidity will be monitored for signs and symptoms of Respiratory Difficulty (Chronic) condition.  Problems will be absent, minimized or managed by discharge/transition of care.   Outcome: Improving  S/P lung tx - underlying chronic ILD resolved. Pt on RA & receiving antirejection meds per orders & continues to receive sildenafil for pHTN.       "

## 2018-03-17 NOTE — PLAN OF CARE
Problem: Patient Care Overview  Goal: Plan of Care/Patient Progress Review  Outcome: No Change  [id number]:    VS: VSS, afebrile  Neuro: A&Ox4.   Cardiac: ST  Respiratory: Sating 98% on RA, tachypniec with RR 24  GI: -  : Adequate urine output.   IV Access: PIV x2 BERNARD  Drains/tubes: NJT - cycled TF from 6p-8a.   Diet/appetite: Regular diet, appetite fair. NJ, TF  Activity:  Ax1  Pain: denies.   Skin: midsternal incision, old CT sites x 5 dermabond, JOSHUA. Coccyx reddened, intact. Lymph wraps in place.    R: Likely will DC on Monday to TCU/ARU.

## 2018-03-18 LAB
ALBUMIN SERPL-MCNC: 2.4 G/DL (ref 3.4–5)
ALT SERPL W P-5'-P-CCNC: 18 U/L (ref 0–50)
ANION GAP SERPL CALCULATED.3IONS-SCNC: 8 MMOL/L (ref 3–14)
BUN SERPL-MCNC: 30 MG/DL (ref 7–30)
CALCIUM SERPL-MCNC: 8.6 MG/DL (ref 8.5–10.1)
CHLORIDE SERPL-SCNC: 106 MMOL/L (ref 94–109)
CO2 SERPL-SCNC: 26 MMOL/L (ref 20–32)
CREAT SERPL-MCNC: 0.9 MG/DL (ref 0.52–1.04)
ERYTHROCYTE [DISTWIDTH] IN BLOOD BY AUTOMATED COUNT: 21.5 % (ref 10–15)
GFR SERPL CREATININE-BSD FRML MDRD: 65 ML/MIN/1.7M2
GLUCOSE BLDC GLUCOMTR-MCNC: 139 MG/DL (ref 70–99)
GLUCOSE BLDC GLUCOMTR-MCNC: 147 MG/DL (ref 70–99)
GLUCOSE BLDC GLUCOMTR-MCNC: 163 MG/DL (ref 70–99)
GLUCOSE SERPL-MCNC: 137 MG/DL (ref 70–99)
HCT VFR BLD AUTO: 31.3 % (ref 35–47)
HGB BLD-MCNC: 9.4 G/DL (ref 11.7–15.7)
INR PPP: 1.76 (ref 0.86–1.14)
MAGNESIUM SERPL-MCNC: 1.7 MG/DL (ref 1.6–2.3)
MCH RBC QN AUTO: 30.9 PG (ref 26.5–33)
MCHC RBC AUTO-ENTMCNC: 30 G/DL (ref 31.5–36.5)
MCV RBC AUTO: 103 FL (ref 78–100)
PHOSPHATE SERPL-MCNC: 3 MG/DL (ref 2.5–4.5)
PLATELET # BLD AUTO: 197 10E9/L (ref 150–450)
POTASSIUM SERPL-SCNC: 5.3 MMOL/L (ref 3.4–5.3)
RBC # BLD AUTO: 3.04 10E12/L (ref 3.8–5.2)
SODIUM SERPL-SCNC: 140 MMOL/L (ref 133–144)
WBC # BLD AUTO: 7.9 10E9/L (ref 4–11)

## 2018-03-18 PROCEDURE — 83735 ASSAY OF MAGNESIUM: CPT | Performed by: INTERNAL MEDICINE

## 2018-03-18 PROCEDURE — 25000132 ZZH RX MED GY IP 250 OP 250 PS 637: Performed by: ANESTHESIOLOGY

## 2018-03-18 PROCEDURE — 25000125 ZZHC RX 250: Performed by: STUDENT IN AN ORGANIZED HEALTH CARE EDUCATION/TRAINING PROGRAM

## 2018-03-18 PROCEDURE — 84460 ALANINE AMINO (ALT) (SGPT): CPT | Performed by: INTERNAL MEDICINE

## 2018-03-18 PROCEDURE — 25000132 ZZH RX MED GY IP 250 OP 250 PS 637: Performed by: SURGERY

## 2018-03-18 PROCEDURE — 25000132 ZZH RX MED GY IP 250 OP 250 PS 637: Performed by: PHYSICIAN ASSISTANT

## 2018-03-18 PROCEDURE — 27210429 ZZH NUTRITION PRODUCT INTERMEDIATE LITER

## 2018-03-18 PROCEDURE — 85610 PROTHROMBIN TIME: CPT | Performed by: INTERNAL MEDICINE

## 2018-03-18 PROCEDURE — 25000128 H RX IP 250 OP 636: Performed by: SURGERY

## 2018-03-18 PROCEDURE — 12000006 ZZH R&B IMCU INTERMEDIATE UMMC

## 2018-03-18 PROCEDURE — 25000131 ZZH RX MED GY IP 250 OP 636 PS 637: Performed by: INTERNAL MEDICINE

## 2018-03-18 PROCEDURE — 25000125 ZZHC RX 250: Performed by: SURGERY

## 2018-03-18 PROCEDURE — 36415 COLL VENOUS BLD VENIPUNCTURE: CPT | Performed by: INTERNAL MEDICINE

## 2018-03-18 PROCEDURE — 25000131 ZZH RX MED GY IP 250 OP 636 PS 637: Performed by: THORACIC SURGERY (CARDIOTHORACIC VASCULAR SURGERY)

## 2018-03-18 PROCEDURE — 80069 RENAL FUNCTION PANEL: CPT | Performed by: INTERNAL MEDICINE

## 2018-03-18 PROCEDURE — 85027 COMPLETE CBC AUTOMATED: CPT | Performed by: INTERNAL MEDICINE

## 2018-03-18 PROCEDURE — 25000132 ZZH RX MED GY IP 250 OP 250 PS 637

## 2018-03-18 PROCEDURE — 00000146 ZZHCL STATISTIC GLUCOSE BY METER IP

## 2018-03-18 PROCEDURE — 25000132 ZZH RX MED GY IP 250 OP 250 PS 637: Performed by: THORACIC SURGERY (CARDIOTHORACIC VASCULAR SURGERY)

## 2018-03-18 RX ORDER — WARFARIN SODIUM 4 MG/1
8 TABLET ORAL
Status: COMPLETED | OUTPATIENT
Start: 2018-03-18 | End: 2018-03-18

## 2018-03-18 RX ADMIN — BUMETANIDE 1 MG: 1 TABLET ORAL at 09:26

## 2018-03-18 RX ADMIN — METOPROLOL TARTRATE 25 MG: 25 TABLET, FILM COATED ORAL at 00:37

## 2018-03-18 RX ADMIN — Medication 2 G: at 13:02

## 2018-03-18 RX ADMIN — PREDNISONE 12 MG: 1 TABLET ORAL at 17:41

## 2018-03-18 RX ADMIN — TACROLIMUS 5 MG: 5 CAPSULE ORAL at 17:35

## 2018-03-18 RX ADMIN — WARFARIN SODIUM 8 MG: 4 TABLET ORAL at 17:35

## 2018-03-18 RX ADMIN — SILDENAFIL CITRATE 10 MG: 10 POWDER, FOR SUSPENSION ORAL at 21:08

## 2018-03-18 RX ADMIN — LEVALBUTEROL TARTRATE 2 PUFF: 45 AEROSOL, METERED ORAL at 21:07

## 2018-03-18 RX ADMIN — SULFAMETHOXAZOLE AND TRIMETHOPRIM 1 TABLET: 400; 80 TABLET ORAL at 09:26

## 2018-03-18 RX ADMIN — LEVALBUTEROL TARTRATE 2 PUFF: 45 AEROSOL, METERED ORAL at 09:31

## 2018-03-18 RX ADMIN — ENOXAPARIN SODIUM 60 MG: 60 INJECTION SUBCUTANEOUS at 23:19

## 2018-03-18 RX ADMIN — NYSTATIN 1000000 UNITS: 500000 SUSPENSION ORAL at 13:04

## 2018-03-18 RX ADMIN — PREDNISONE 13 MG: 1 TABLET ORAL at 09:23

## 2018-03-18 RX ADMIN — SILDENAFIL CITRATE 10 MG: 10 POWDER, FOR SUSPENSION ORAL at 11:58

## 2018-03-18 RX ADMIN — NYSTATIN 1000000 UNITS: 500000 SUSPENSION ORAL at 09:27

## 2018-03-18 RX ADMIN — MYCOPHENOLATE MOFETIL 1500 MG: 250 CAPSULE ORAL at 17:34

## 2018-03-18 RX ADMIN — NYSTATIN 1000000 UNITS: 500000 SUSPENSION ORAL at 16:34

## 2018-03-18 RX ADMIN — ENOXAPARIN SODIUM 60 MG: 60 INJECTION SUBCUTANEOUS at 11:56

## 2018-03-18 RX ADMIN — METOPROLOL TARTRATE 25 MG: 25 TABLET, FILM COATED ORAL at 23:19

## 2018-03-18 RX ADMIN — PANTOPRAZOLE SODIUM 40 MG: 40 TABLET, DELAYED RELEASE ORAL at 09:26

## 2018-03-18 RX ADMIN — TACROLIMUS 5 MG: 5 CAPSULE ORAL at 09:22

## 2018-03-18 RX ADMIN — VALGANCICLOVIR 900 MG: 450 TABLET, FILM COATED ORAL at 09:25

## 2018-03-18 RX ADMIN — LEVALBUTEROL TARTRATE 2 PUFF: 45 AEROSOL, METERED ORAL at 16:34

## 2018-03-18 RX ADMIN — QUETIAPINE 50 MG: 50 TABLET ORAL at 21:08

## 2018-03-18 RX ADMIN — ENOXAPARIN SODIUM 60 MG: 60 INJECTION SUBCUTANEOUS at 00:37

## 2018-03-18 RX ADMIN — METOPROLOL TARTRATE 25 MG: 25 TABLET, FILM COATED ORAL at 11:59

## 2018-03-18 RX ADMIN — NYSTATIN 1000000 UNITS: 500000 SUSPENSION ORAL at 21:08

## 2018-03-18 RX ADMIN — MULTIVITAMIN 15 ML: LIQUID ORAL at 09:30

## 2018-03-18 RX ADMIN — SILDENAFIL CITRATE 10 MG: 10 POWDER, FOR SUSPENSION ORAL at 04:04

## 2018-03-18 RX ADMIN — LEVALBUTEROL TARTRATE 2 PUFF: 45 AEROSOL, METERED ORAL at 13:04

## 2018-03-18 RX ADMIN — MYCOPHENOLATE MOFETIL 1500 MG: 250 CAPSULE ORAL at 09:14

## 2018-03-18 ASSESSMENT — ACTIVITIES OF DAILY LIVING (ADL)
ADLS_ACUITY_SCORE: 15

## 2018-03-18 NOTE — PROGRESS NOTES
Diabetes Consult Daily  Progress Note          Assessment/Plan:     Ms. Kecia Blue is a 57-year-old woman with a history of ILD and pulmonary hypertension, dermatomyositis, RA, and Raynauds syndrome, who is admitted for emergent lung transplant workup on 2/21 for increased shortness of breath and hypoxia, required [facility] ECMO for R heart failure, now s/p bilateral sequential lung transplant on 3/1.  No history of diabetes.  Kecia is experiencing hyperglycemia secondary to enteral feeds and steroids.     Glucoses have remained low to mid 100s on just small dose of NPH with TFs and correction aspart only.  FT now pulled b/c of adequate po intake.  Anticipate minimal to no insulin will be needed with TFs now off and pt doing better overall.    Plan:  - NPH: discontinued.  -meal aspart dc'd yesterday  -Correction aspart: b/c of difficulty with glucose monitoring (fingertips cannot be used b/c of Raynaud's, now difficulty getting sample from PIV and having to use earlobes- more painful) we will change to medium correction for BG >140 before supper only.  -Glucose monitoring: POCT glucose presupper only for now, we will also review fasting glucose on lab tomorrow.  If glucoses running higher we will have to go back to glucose and correction aspart ac and hs-- but if staying 100-150 could continue with presupper BG check only or dc all together (glucose would then be checked 2x/week on lab while at ARU).       Plan discussed with patient and family, bedside RN             Interval History:   The last 24 hours progress and nursing notes reviewed.  Cycled Nutren 70 cc/h 2775-9580.  Prednisone adjusted to 13 + 12 on 3/15, then taper next on 3/22  Creatinine continues to improve: 0.9 today.  Very good po intake yesterday (1200kcal) so NJ tube removed today.  Since yesterday morning glucoses have run low to mid 100s.  Yesterday evening cycle TF started, pt ate supper and then supplement shake (no  "aspart- dc'd carb coverage earlier in the day), glucose a couple hours later was only 151.  Today they have not been able to get blood from PIV, so they have used earlobes for glucose checks.  Kecia says she doesn't mind, but her  indicated that this has been quite bothersome.  BG midday after good bfast in the 130s.  Earlier this morning BG in the 150s , but this was at end of cycled feed.      Expecting ARU at discharge (likely tomorrow).        Recent Labs  Lab 03/18/18  0534 03/18/18  0042 03/17/18  2114 03/17/18  1830 03/17/18  1431 03/17/18  0905 03/17/18  0608 03/17/18  0604  03/16/18  0639  03/15/18  0440  03/14/18  0640  03/13/18  0455   *  --   --   --   --   --   --  125*  --  127*  --  199*  --  152*  --  194*   BGM  --  139* 151* 138* 105* 98 109*  --   < >  --   < > 192*  < >  --   < >  --    < > = values in this interval not displayed.            Review of Systems:   See interval hx          Medications:       Active Diet Order      Regular Diet Adult      Advance Diet as Tolerated     Physical Exam:  Gen: Sitting up in bed getting nails painted by daughter/granddaughter.  grandson also present.  HEENT: NC/AT, mucous membranes are moist, NJ removed.  Resp: Unlabored at rest  Neuro:alert and oriented, communicating clearly.  /90 (BP Location: Right arm)  Pulse 106  Temp 97.7  F (36.5  C) (Oral)  Resp 24  Ht 1.6 m (5' 3\")  Wt 59.7 kg (131 lb 9.8 oz)  SpO2 98%  BMI 23.31 kg/m2           Data:     Lab Results   Component Value Date    A1C 5.5 03/01/2018    A1C Canceled, Test credited 03/01/2018    A1C 5.3 02/27/2018            Recent Labs   Lab Test  03/18/18   0534  03/17/18   0604   NA  140  142   POTASSIUM  5.3  4.7   CHLORIDE  106  110*   CO2  26  24   ANIONGAP  8  9   GLC  137*  125*   BUN  30  33*   CR  0.90  0.93   CHELSEY  8.6  8.7     CBC RESULTS:   Recent Labs   Lab Test  03/18/18   0534   WBC  7.9   RBC  3.04*   HGB  9.4*   HCT  31.3*   MCV  103*   MCH  30.9   MCHC "  30.0*   RDW  21.5*   PLT  197       Kaitlynn Chin PA-C [phone]  Diabetes Management job code [id number]

## 2018-03-18 NOTE — PROGRESS NOTES
Calorie Count  Intake recorded for: 3/17                  Kcals: 1246                      Protein: 41.5g  # Meals Recorded: 100% hash browns, 4oz 1% milk, orange juice, You shake, 75% butter crumbed cod, mashed potatoes with gravy, 50% scrambled eggs, You small fries  # Supplements Recorded: 0

## 2018-03-18 NOTE — PROGRESS NOTES
Pulmonary Medicine  Cystic Fibrosis - Lung Transplant Daily Progress Note   March 18, 2018       Patient: Kecia Blue  MRN: [id number]  Transplant Date: 2/21/2018 (POD# 17)  Admission date: 2/21/2018  Hospital Day #25          Assessment and Plan:     Kecia Blue is a 58 y/o female w/PMHx significant for dermatomyositis (on immunosuppression), seronegative RA, ILD, and pulmonary hypertension who was admitted for emergent lung transplant w/u on 2/21 for increased SOB and hypoxia. Required V-A ECMO for right heart failure on 2/27. Now s/p bilateral sequential lung transplant on 3/1, ECMO decannulation on 3/3. Post-op course c/b hemodynamic instability requiring pressors and iFlolan (off, but now on PO Revatio). Extubated 3/7, sating well on RA. Making gradual progress overall.      Changes today 3/18/18:  - calorie counts adequate for 3/17 (1200 kcal!).  DC [place] tube  - continue PT/OT  - tacro level ordered for Monday 3/19       S/p bilateral lung transplant on 3/1/18 for acute hypoxic/hypercapneic respiratory failure 2/2 ILD.  Post-op Pulm HTN 2/2 ILD.   Adequate graft function. Weaned off iFlolan, however still on PO Revatio. Extubated 3/6, sating well on RA. Minimal pulmonary symptoms. Last chest tube removed 3/13. Surveillance bronchoscopy 3/13 revealing bilateral anastomoses intact and patent, thick yellow secretions around right anastomosis, suctioned and removed.   - Diuretics as noted below  - Continue aggressive pulmonary toilet with acapella/incentive spirometry q 1hr while awake.                     Continue 3-drug immunosuppression:  - Tacrolimus with target goal 10-12 (decresaed for RADHA). Increased to 5mg BID on 3/17/18, repeat level on Monday 3/19 ordered.   - MMF 1500 g BID   - Prednisone 13mg qAM / 12mg qPM. Steroid taper below:    Date AM PM   3/15/18 13 12.5   3/22/18 12.5 12.5   4/26/18 12.5 10   5/24/18 10 10   6/22/18 10 7.5   7/20/18 7.5 7.5   8/24/18 7.5 5   9/21/18 5 5      Prophylaxis:   PJP: Bactrim  CMV: vangancyclovir  Fungal: Nystatin        Donor Recipient Intervention   CMV status  Pos   Pos  valgancyclovir    EBV status Pos Pos Immune    HSV status N/A  Pos Immune       Infectious disease.  SIRS, resolved.  No known infectious disease history. Post-operative SIRS likely a response from multiple procedures (VA ECMO cannulation and decannulation) and transplant surgery. Has been afebrile for some time. Negative recipient bronch culture from time of transplant. Pan cultured 3/4 NGTD.   - Donor culture at [facility] grew MRSA. Received 2-week course of vancomycin.  - Donor culture at OSH grew MRSA, Strep pneumoniae, Moraxella catarrhalis, Haemophilus species. Received 2-week course of pip/tazo.      RADHA.  Volume overload.  Suspect secondary to meds and diuresis. Serum Cr+ improving nicely. LE edema likely 2/2 low albumin state. Improved with diuretics.  - Bumex PO daily  - Trend BMP daily  - Avoid nephrotoxic agents as possible  - Decrease tacro goal 10-12     Acute anemia.  Hgb slowly drifting down over the past one week. No evidence of bleeding. Likely 2/2 multiple lab draws, bone marrow suppressive medications. Received transfusion 3/15 with appropriate response.   - Trend hgb daily    Steroid-induced hyperglycemia.  Transferred out of ICU on insulin drip. Transitioned to intermittent SQ insulin. Glucose relatively well controlled.   - Appreciate Endocrine's assistance managing glucose / insulin needs  - Monitor glucose ACHS  - Hypoglycemia protocol     Dysphagia, resolved.  Noted post-transplant. SLP consulted while inpatient. Transitioned to regular diet on 3/15.    Diarrhea.  Large bowel loops on imaging.  Pain on defecation.  Diarrhea slowly improving, likely 2/2 antibiotics, immunosuppression agents and/or TFs. Pain while defecation could be anal fissure versus hemorrhoid. C diff negative. Abd XR 3/15 with decrease gaseous distention and stool burden. Overall improved.  -  Desitin cream daily BID prn  - Imodium prn    Small-mod L pleural effusion, likely hemothorax.  Noted on Chest CT 3/8. Repeat CXR relatively unchanged in size. Required 1 unit pRBCs on 3/8. Will continue to monitor with imaging. If worsening, could consider VATS versus chest tube placement w/ lytic therapy.         LUE DVT, provoked.  Found on UE ultrasound on 3/7. Initiated heparin gtt as bridge with warfarin added on 3/12. Discontinued 3/16.  - Discontinue low-intensity heparin gtt and initiate enoxaparin treatment dose as bridge to therapeutic INR  - Warfarin, pharm to dose  - INR goal 2-3. Will need 3-months of anticoagulation.        FEN: Dysphagia diet level 3 with thin liquids and TF  PPX: Heparin gtt, warfarin, PPI   CODE: Full  DISPO: Inpatient cares. TCU early next week (~ possibly Monday).       Patient seen and discussed with Dr. Sana Land MD  Pulmonary and Critical Care Fellow  Pager [phone]          Subjective & Interval History:     Last 24 hour vital signs, labs and care team notes reviewed.    No acute issues overnight.  Patient breathing comfortably on room air- working on swallowing pills again this morning and doing OK.  Took in 1200kcals yesterday.  No nausea, vomiting, diarrhea, or constipation.  Blood sugars OK when measured from ear.             Review of Systems:     C: no fever, no chills, no change in weight.  Appetite OK.   INTEGUMENTARY/SKIN: no rash or obvious new lesions  ENT/MOUTH: no sore throat, no sinus pain, no nasal drainage  RESP: see interval history  CV: no chest pain, no palpitations, no orthopnea  GI: no nausea, no vomiting, no change in stools, no reflux symptoms  : no dysuria  MUSCULOSKELETAL: no myalgias, no arthralgias  ENDOCRINE: blood sugars with adequate control  NEURO: no headache, no numbness or tingling  PSYCHIATRIC: mood stable          Medications:     Active Medications:    insulin isophane human  10 Units Subcutaneous Q24H     insulin  aspart  1-7 Units Subcutaneous TID AC     insulin aspart  1-6 Units Subcutaneous BID     tacrolimus  5 mg Oral BID IS     enoxaparin  1 mg/kg Subcutaneous Q12H     pantoprazole  40 mg Oral QAM     valGANciclovir  900 mg Oral Daily     bumetanide  1 mg Oral Daily     predniSONE  13 mg Oral Daily     predniSONE  12 mg Oral QPM     multivitamins with minerals  15 mL Oral Daily     sulfamethoxazole-trimethoprim  1 tablet Oral or NG Tube Daily     metoprolol tartrate  25 mg Oral BID     QUEtiapine  50 mg Oral At Bedtime     levalbuterol  2 puff Inhalation Q4H JULES     sildenafil  10 mg Oral or Feeding Tube Q8H     nystatin  1,000,000 Units Swish & Swallow 4x Daily     mycophenolate  1,500 mg Oral BID IS     Active PRN Medications:  polyethylene glycol, Warfarin Therapy Reminder, simethicone, HOLD MEDICATION, zinc oxide, sennosides, potassium phosphate (KPHOS) in D5W IV, potassium phosphate (KPHOS) in D5W IV, potassium phosphate (KPHOS) in D5W IV, potassium phosphate (KPHOS) in D5W IV, potassium phosphate (KPHOS) in D5W IV, artificial tears, metoprolol, IV fluid REPLACEMENT ONLY, naloxone, acetaminophen, oxyCODONE IR, ondansetron **OR** ondansetron, IV fluid REPLACEMENT ONLY, glucose **OR** dextrose **OR** glucagon, potassium chloride, potassium chloride, potassium chloride, potassium chloride with lidocaine, potassium chloride, magnesium sulfate, magnesium sulfate         Physical Exam:     Constitutional: Awake. Alert, in no apparent distress. Sitting at the edge of the bed.  HEENT: Eyes with pink conjunctivae, no scleral jaundice. Oral mucosa moist without lesions. Neck supple without lymphadenopathy.   +NJ tube  PULM: Reduced air flow bilaterally lower lobes. Clear lung sounds anteriorly & posteriorly. No crackles, no rhonchi, no wheezes.   CV: Normal S1 and S2. RRR. No murmur, gallop, or rub.Compresion stockings on.  ABD: NABS, soft, nontender, Not distended. No guarding.   MSK: Moves all extremities. No apparent  "muscle wasting.   NEURO: Alert and oriented x 4, conversant.   SKIN: Warm, dry. No pruritus. No rash on limited exam.   PSYCH: Mood stable, judgment and insight appropriate.?     Lines, Drains, and Devices:  Peripheral IV 03/07/18 Right;Anterior Upper forearm (Active)   Site Assessment [facility] 3/13/2018  4:00 AM   Line Status Infusing 3/13/2018  4:00 AM   Phlebitis Scale 0-->no symptoms 3/13/2018  4:00 AM   Infiltration Scale 0 3/13/2018  4:00 AM   Infiltration Site Treatment Method  None 3/13/2018  4:00 AM   Extravasation? No 3/13/2018  4:00 AM   Number of days:6       Peripheral IV 03/08/18 Right Hand (Active)   Site Assessment [facility] 3/13/2018  4:00 AM   Line Status Infusing 3/13/2018  4:00 AM   Phlebitis Scale 0-->no symptoms 3/13/2018  4:00 AM   Infiltration Scale 0 3/13/2018  4:00 AM   Infiltration Site Treatment Method  None 3/13/2018  4:00 AM   Extravasation? No 3/13/2018  4:00 AM   Number of days:5       Peripheral IV 03/10/18 Right Upper forearm (Active)   Site Assessment [facility] 3/13/2018  4:00 AM   Line Status Saline locked 3/13/2018  4:00 AM   Phlebitis Scale 0-->no symptoms 3/13/2018  4:00 AM   Infiltration Scale 0 3/13/2018  4:00 AM   Infiltration Site Treatment Method  None 3/13/2018  4:00 AM   Extravasation? No 3/13/2018  4:00 AM   Number of days:3            Data:     All vital signs, laboratory and imaging data for the past 24 hours reviewed.      Vital signs:  Temp: 98.3  F (36.8  C) Temp src: Oral BP: 131/86   Heart Rate: 105 Resp: 24 SpO2: 97 % O2 Device: None (Room air) Oxygen Delivery: 3 LPM Height: 160 cm (5' 3\") Weight: 59.7 kg (131 lb 9.8 oz)    Weight trend:   Vitals:    03/16/18 0514 03/17/18 0133 03/18/18 0037   Weight: 60.8 kg (134 lb 0.6 oz) 60.2 kg (132 lb 11.5 oz) 59.7 kg (131 lb 9.8 oz)        I/O:     Intake/Output Summary (Last 24 hours) at 03/18/18 0741  Last data filed at 03/18/18 0500   Gross per 24 hour   Intake             1430 ml   Output             2100 ml   Net             -670 ml "       Labs    CMP:     Recent Labs  Lab 03/18/18  0534 03/17/18  0604 03/16/18  0639 03/15/18  0440    142 142 139   POTASSIUM 5.3 4.7 4.7 4.3   CHLORIDE 106 110* 109 106   CO2 26 24 23 23   ANIONGAP 8 9 10 10   * 125* 127* 199*   BUN 30 33* 38* 38*   CR 0.90 0.93 1.03 1.13*   GFRESTIMATED 65 63 56* 50*   GFRESTBLACK 78 76 67 60*   CHELSEY 8.6 8.7 8.5 7.8*   MAG 1.7 1.7 1.7 2.1   PHOS 3.0 3.0 2.3* 2.5   ALBUMIN 2.4* 2.4*  --  2.4*   ALT 18 22  --  22     CBC:     Recent Labs  Lab 03/18/18  0534 03/17/18  0604 03/16/18  0639 03/15/18  0440   WBC 7.9 8.4 9.2 8.9   RBC 3.04* 3.08* 2.94* 2.25*   HGB 9.4* 9.4* 9.0* 6.9*   HCT 31.3* 31.0* 29.6* 22.3*   * 101* 101* 99   MCH 30.9 30.5 30.6 30.7   MCHC 30.0* 30.3* 30.4* 30.9*   RDW 21.5* 21.0* 20.5* 20.3*    212 215 215     INR:     Recent Labs  Lab 03/18/18  0534 03/17/18  0604 03/16/18  0639 03/15/18  0440   INR 1.76* 1.31* 1.36* 1.37*     Glucose:   Recent Labs  Lab 03/18/18  0534 03/18/18  0042 03/17/18  2114 03/17/18  1830 03/17/18  1431 03/17/18  0905 03/17/18  0608 03/17/18  0604  03/16/18  0639  03/15/18  0440  03/14/18  0640  03/13/18  0455   *  --   --   --   --   --   --  125*  --  127*  --  199*  --  152*  --  194*   BGM  --  139* 151* 138* 105* 98 109*  --   < >  --   < > 192*  < >  --   < >  --    < > = values in this interval not displayed.  Blood Gas: No lab results found in last 7 days.  Culture Data No results for input(s): CULT in the last 168 hours.  Virology Data:   Lab Results   Component Value Date    FLUAH1 Negative 02/27/2018    FLUAH3 Negative 02/27/2018    BY1446 Negative 02/27/2018    IFLUB Negative 02/27/2018    RSVA Negative 02/27/2018    RSVB Negative 02/27/2018    PIV1 Negative 02/27/2018    PIV2 Negative 02/27/2018    PIV3 Negative 02/27/2018    HMPV Negative 02/27/2018    HRVS Negative 02/27/2018    ADVBE Negative 02/27/2018    ADVC Negative 02/27/2018     Historical CMV results (last 3 of prior testing):  Lab  Results   Component Value Date    CMVQNT Canceled, Test credited (A) 03/01/2018    CMVQNT Canceled, Test credited (A) 03/01/2018     Lab Results   Component Value Date    CMVLOG Canceled, Test credited 03/01/2018    CMVLOG Canceled, Test credited 03/01/2018     Urine Studies    Recent Labs   Lab Test  03/04/18   1425   URINEPH  5.5   NITRITE  Negative   LEUKEST  Negative   WBCU  5   Pulmonary Consult Attending Note:    I saw and examined the patient w the resident/fellow. I reviewed the labs, imaging studies and cultures.    Please see the resident's/fellow's note for detailed physical exam, assessment and plan which I agree and formulated together.    Nicho Hood MD

## 2018-03-18 NOTE — PLAN OF CARE
Problem: Patient Care Overview  Goal: Plan of Care/Patient Progress Review  Outcome: No Change  Neuro: A&Ox4.   Cardiac: SR/ST  Respiratory: O2 98% on RA, intermitt loose, productive cough, LLL some crackles, improved w coughing; encouraged pulm toilet  GI/: Adequate urine output. Loose BM X3  Diet/appetite: Omar counts, appetite fair, trying to find foods that are appealing   Activity:  Assist of 1 up to commode in room.  Pain: Denies  Skin: Midline incision & lap sites approximated; JOSHUA. Groin site WDL. Mepliplex on coccyx. Lymph wraps on BLEs.  Lab: Mag replaced per protocol     Plan for poss dc to TCU Monday

## 2018-03-18 NOTE — PLAN OF CARE
"Problem: Patient Care Overview  Goal: Plan of Care/Patient Progress Review  Outcome: No Change  Care Provided: 1900-0730    Neuro: A&Ox4.   Cardiac: SR/Stach. /79 (BP Location: Right arm)  Pulse 106  Temp 97.7  F (36.5  C) (Oral)  Resp 26  Ht 1.6 m (5' 3\")  Wt 59.7 kg (131 lb 9.8 oz)  SpO2 96%  BMI 23.31 kg/m2  Respiratory: Sating % on RA - spot checking throughout night. Lungs clear/diminished; intermittent fine crackles LLL; infrequent non-productive cough.  GI/: Adequate urine output. Loose BM X2.  Diet/appetite: Cycled TF @50cc/hr per NJ from 4544-0099. Tolerating regular diet.  Activity:  Assist of 1 up to commode in room.  Pain: Pt denies presence of pain; no PRNs given.  Skin: Midline incision & lap sites approximated; JOSHUA. Groin site WDL. Coccyx purplish/nonblanchable redness - mepilex placed for padding. Lymph wraps on BLEs.  LDA: PIVx2- S/L; drawing BG off PIV.     R: Will continue to monitor pt closely and notify primary team with any changes.      Problem: Chronic Respiratory Difficulty Comorbidity  Goal: Chronic Respiratory Difficulty  Patient comorbidity will be monitored for signs and symptoms of Respiratory Difficulty (Chronic) condition.  Problems will be absent, minimized or managed by discharge/transition of care.   Outcome: Improving  S/P lung tx - underlying chronic ILD resolved. Pt on RA & receiving antirejection meds per orders & continues to receive sildenafil for pHTN.       "

## 2018-03-19 ENCOUNTER — APPOINTMENT (OUTPATIENT)
Dept: OCCUPATIONAL THERAPY | Facility: CLINIC | Age: 58
DRG: 003 | End: 2018-03-19
Attending: INTERNAL MEDICINE
Payer: COMMERCIAL

## 2018-03-19 LAB
ALBUMIN SERPL-MCNC: 2.5 G/DL (ref 3.4–5)
ALT SERPL W P-5'-P-CCNC: 21 U/L (ref 0–50)
ANION GAP SERPL CALCULATED.3IONS-SCNC: 10 MMOL/L (ref 3–14)
BUN SERPL-MCNC: 36 MG/DL (ref 7–30)
CALCIUM SERPL-MCNC: 8.8 MG/DL (ref 8.5–10.1)
CHLORIDE SERPL-SCNC: 107 MMOL/L (ref 94–109)
CO2 SERPL-SCNC: 23 MMOL/L (ref 20–32)
CREAT SERPL-MCNC: 0.93 MG/DL (ref 0.52–1.04)
ERYTHROCYTE [DISTWIDTH] IN BLOOD BY AUTOMATED COUNT: 21.5 % (ref 10–15)
GFR SERPL CREATININE-BSD FRML MDRD: 62 ML/MIN/1.7M2
GLUCOSE BLDC GLUCOMTR-MCNC: 132 MG/DL (ref 70–99)
GLUCOSE BLDC GLUCOMTR-MCNC: 158 MG/DL (ref 70–99)
GLUCOSE SERPL-MCNC: 115 MG/DL (ref 70–99)
HCT VFR BLD AUTO: 29.5 % (ref 35–47)
HGB BLD-MCNC: 8.9 G/DL (ref 11.7–15.7)
INR PPP: 2.48 (ref 0.86–1.14)
MAGNESIUM SERPL-MCNC: 1.9 MG/DL (ref 1.6–2.3)
MCH RBC QN AUTO: 30.9 PG (ref 26.5–33)
MCHC RBC AUTO-ENTMCNC: 30.2 G/DL (ref 31.5–36.5)
MCV RBC AUTO: 102 FL (ref 78–100)
PHOSPHATE SERPL-MCNC: 3.8 MG/DL (ref 2.5–4.5)
PLATELET # BLD AUTO: 231 10E9/L (ref 150–450)
POTASSIUM SERPL-SCNC: 5.2 MMOL/L (ref 3.4–5.3)
RBC # BLD AUTO: 2.88 10E12/L (ref 3.8–5.2)
SODIUM SERPL-SCNC: 139 MMOL/L (ref 133–144)
TACROLIMUS BLD-MCNC: 9.5 UG/L (ref 5–15)
TME LAST DOSE: NORMAL H
WBC # BLD AUTO: 7.2 10E9/L (ref 4–11)

## 2018-03-19 PROCEDURE — 25000132 ZZH RX MED GY IP 250 OP 250 PS 637: Performed by: ANESTHESIOLOGY

## 2018-03-19 PROCEDURE — 25000132 ZZH RX MED GY IP 250 OP 250 PS 637: Performed by: INTERNAL MEDICINE

## 2018-03-19 PROCEDURE — 85027 COMPLETE CBC AUTOMATED: CPT | Performed by: INTERNAL MEDICINE

## 2018-03-19 PROCEDURE — 80197 ASSAY OF TACROLIMUS: CPT | Performed by: INTERNAL MEDICINE

## 2018-03-19 PROCEDURE — 25000132 ZZH RX MED GY IP 250 OP 250 PS 637: Performed by: SURGERY

## 2018-03-19 PROCEDURE — 83735 ASSAY OF MAGNESIUM: CPT | Performed by: INTERNAL MEDICINE

## 2018-03-19 PROCEDURE — 25000131 ZZH RX MED GY IP 250 OP 636 PS 637: Performed by: THORACIC SURGERY (CARDIOTHORACIC VASCULAR SURGERY)

## 2018-03-19 PROCEDURE — G0463 HOSPITAL OUTPT CLINIC VISIT: HCPCS

## 2018-03-19 PROCEDURE — 25000125 ZZHC RX 250: Performed by: STUDENT IN AN ORGANIZED HEALTH CARE EDUCATION/TRAINING PROGRAM

## 2018-03-19 PROCEDURE — 25000131 ZZH RX MED GY IP 250 OP 636 PS 637: Performed by: SURGERY

## 2018-03-19 PROCEDURE — 97116 GAIT TRAINING THERAPY: CPT | Mod: GP | Performed by: REHABILITATION PRACTITIONER

## 2018-03-19 PROCEDURE — 84460 ALANINE AMINO (ALT) (SGPT): CPT | Performed by: INTERNAL MEDICINE

## 2018-03-19 PROCEDURE — 12000006 ZZH R&B IMCU INTERMEDIATE UMMC

## 2018-03-19 PROCEDURE — 97530 THERAPEUTIC ACTIVITIES: CPT | Mod: GO

## 2018-03-19 PROCEDURE — 97535 SELF CARE MNGMENT TRAINING: CPT | Mod: GO

## 2018-03-19 PROCEDURE — 80069 RENAL FUNCTION PANEL: CPT | Performed by: INTERNAL MEDICINE

## 2018-03-19 PROCEDURE — 40000133 ZZH STATISTIC OT WARD VISIT

## 2018-03-19 PROCEDURE — 25000131 ZZH RX MED GY IP 250 OP 636 PS 637: Performed by: INTERNAL MEDICINE

## 2018-03-19 PROCEDURE — 85610 PROTHROMBIN TIME: CPT | Performed by: INTERNAL MEDICINE

## 2018-03-19 PROCEDURE — 25000125 ZZHC RX 250: Performed by: SURGERY

## 2018-03-19 PROCEDURE — 25000132 ZZH RX MED GY IP 250 OP 250 PS 637: Performed by: THORACIC SURGERY (CARDIOTHORACIC VASCULAR SURGERY)

## 2018-03-19 PROCEDURE — 36415 COLL VENOUS BLD VENIPUNCTURE: CPT | Performed by: INTERNAL MEDICINE

## 2018-03-19 PROCEDURE — 97530 THERAPEUTIC ACTIVITIES: CPT | Mod: GP | Performed by: REHABILITATION PRACTITIONER

## 2018-03-19 PROCEDURE — 40000193 ZZH STATISTIC PT WARD VISIT: Performed by: REHABILITATION PRACTITIONER

## 2018-03-19 PROCEDURE — 27210429 ZZH NUTRITION PRODUCT INTERMEDIATE LITER

## 2018-03-19 RX ORDER — WARFARIN SODIUM 4 MG/1
4 TABLET ORAL
Status: COMPLETED | OUTPATIENT
Start: 2018-03-19 | End: 2018-03-19

## 2018-03-19 RX ORDER — TACROLIMUS 5 MG/1
5 CAPSULE ORAL
Status: DISCONTINUED | OUTPATIENT
Start: 2018-03-20 | End: 2018-03-22

## 2018-03-19 RX ADMIN — MYCOPHENOLATE MOFETIL 1500 MG: 250 CAPSULE ORAL at 17:08

## 2018-03-19 RX ADMIN — TACROLIMUS 5 MG: 5 CAPSULE ORAL at 08:27

## 2018-03-19 RX ADMIN — Medication 2 G: at 08:43

## 2018-03-19 RX ADMIN — BUMETANIDE 1 MG: 1 TABLET ORAL at 08:27

## 2018-03-19 RX ADMIN — VALGANCICLOVIR 900 MG: 450 TABLET, FILM COATED ORAL at 08:25

## 2018-03-19 RX ADMIN — LEVALBUTEROL TARTRATE 2 PUFF: 45 AEROSOL, METERED ORAL at 23:45

## 2018-03-19 RX ADMIN — LEVALBUTEROL TARTRATE 2 PUFF: 45 AEROSOL, METERED ORAL at 18:15

## 2018-03-19 RX ADMIN — NYSTATIN 1000000 UNITS: 500000 SUSPENSION ORAL at 19:14

## 2018-03-19 RX ADMIN — PREDNISONE 13 MG: 1 TABLET ORAL at 08:26

## 2018-03-19 RX ADMIN — MYCOPHENOLATE MOFETIL 1500 MG: 250 CAPSULE ORAL at 08:26

## 2018-03-19 RX ADMIN — QUETIAPINE 50 MG: 50 TABLET ORAL at 21:19

## 2018-03-19 RX ADMIN — SULFAMETHOXAZOLE AND TRIMETHOPRIM 1 TABLET: 400; 80 TABLET ORAL at 08:25

## 2018-03-19 RX ADMIN — WARFARIN SODIUM 4 MG: 4 TABLET ORAL at 17:08

## 2018-03-19 RX ADMIN — PANTOPRAZOLE SODIUM 40 MG: 40 TABLET, DELAYED RELEASE ORAL at 08:24

## 2018-03-19 RX ADMIN — TACROLIMUS 5.5 MG: 5 CAPSULE ORAL at 17:07

## 2018-03-19 RX ADMIN — NYSTATIN 1000000 UNITS: 500000 SUSPENSION ORAL at 08:28

## 2018-03-19 RX ADMIN — METOPROLOL TARTRATE 25 MG: 25 TABLET, FILM COATED ORAL at 12:19

## 2018-03-19 RX ADMIN — Medication 10 MG: at 08:28

## 2018-03-19 RX ADMIN — Medication 10 MG: at 19:14

## 2018-03-19 RX ADMIN — NYSTATIN 1000000 UNITS: 500000 SUSPENSION ORAL at 12:19

## 2018-03-19 RX ADMIN — METOPROLOL TARTRATE 25 MG: 25 TABLET, FILM COATED ORAL at 23:44

## 2018-03-19 RX ADMIN — NYSTATIN 1000000 UNITS: 500000 SUSPENSION ORAL at 15:41

## 2018-03-19 RX ADMIN — PREDNISONE 12 MG: 1 TABLET ORAL at 17:08

## 2018-03-19 RX ADMIN — Medication 10 MG: at 13:13

## 2018-03-19 ASSESSMENT — ACTIVITIES OF DAILY LIVING (ADL)
ADLS_ACUITY_SCORE: 15

## 2018-03-19 NOTE — PROGRESS NOTES
"[facility] Nurse Inpatient Wound Assessment     Initial  Assessment  Reason for consultation: Evaluate and treat coccyx    D: Per Md Notes: 56 y/o female w/PMHx significant for dermatomyositis (on immunosuppression), seronegative RA, ILD, and pulmonary hypertension who was admitted for emergent lung transplant w/u on 2/21 for increased SOB and hypoxia. Required V-A ECMO for right heart failure on 2/27. Now s/p bilateral sequential lung transplant on 3/1, ECMO decannulation on 3/3. Post-op course c/b hemodynamic instability requiring pressors and iFlolan (off, but now on PO Revatio). Extubated 3/7, sating well on RA. Making gradual progress overall.     A: Assessed bilateral buttock, coccyx and sacrum. Mild blanchable erythema at coccyx, no concerns.     I: Orders entered:    P: Continue Preventative Coccyx 1. Clean intact skin with gentle soap and water, dry and dry again.  2. Paint with No Sting Skin Prep (#[id number]) and allow to dry thoroughly  3. Press a Mepilex Sacral to the area, making sure to conform nicely to skin curvatures  4. Time and date dressing change and zachary with a \"P\" for prevention.  5. Reposition pt every 1 to 2 hours when in bed and hourly when up to the chair to relieve pressure and promote perfusion to tissue  NOTE** make sure to continue to assess under the Mepilex Dressing BID and document findings.  If epidermis  opens notify CWOCN's and change dressing to \"T\" for treatment    Educated patient and  regarding pressure ulcer prevention. Turning and repositioning.      WO Nurse follow-up plan:Sign off  Nursing to notify the Provider(s) and re-consult the WO Nurse if wound(s) deteriorates or new skin concern.    Discussed plan of care with Patient and Family    Yenni SCALESN, RN, CWOCN     "

## 2018-03-19 NOTE — PROGRESS NOTES
Transplant Social Work Services Progress Note      Date of Initial Social Work Evaluation: 2/20/2018  Collaborated with: Patient and spouse    Data: Followed up with Kecia and her  after support group earlier today.  Intervention: Provided tips on how to decrease anxiety surrounding face mask. She expressed that wearing the face mask gives her anxiety and that the anxiety is getting in the way of her being successful at walking/physical therapy. We worked together to talk about how she can change her thought process about it - to think about the face mask as something that is protecting her from getting sick, not something that is hurting her.  Assessment: Kecia is coping well, she was open to talking about her anxiety and getting tips on how to manage it.  Education provided by SW: Support groups, coping techniques for anxiety.  Plan:    Discharge Plans in Progress: [facility]    Barriers to d/c plan: Bed availability    Follow up Plan: Will follow as needed throughout hospitalization.

## 2018-03-19 NOTE — PLAN OF CARE
Problem: Patient Care Overview  Goal: Plan of Care/Patient Progress Review  Discharge Planner PT   Patient plan for discharge: pt plans to discharge to ARU once medically ready  Current status: Pt expresses increased anxiety throughout session regarding having to wear a mask while ambulating. Spent increased time discussing use of mask, breathing techniques, calming pt, and reassurance due to increased anxiety. Pt transfers sit<>stand from EOB with CGA, Wally from wc. Pt completes a stand pivot transfer from EOB>chair CGA. Pt ambulates 60 , 100  CGA with wc follow, extended rest breaks needed.  Barriers to return to prior living situation: anxiety regarding mobility, impaired LE strength, functional mobility, and activity tolerance.  Recommendations for discharge: ARU  Rationale for recommendations: pt would benefit from intense skilled rehab in order to improve the above listed impairments.        Entered by: Vibha Bang 03/19/2018 11:00 AM

## 2018-03-19 NOTE — PROGRESS NOTES
Calorie Counts  Intake recorded for: 3/18 Kcal: 1876 Protein: 73g  # Meals recorded: 2 meals (First: 100% breakfast sandwich with scrambled eggs, english muffin, cheddar cheese and sausage kailash)      (Second: 100% diet lemonade, 66% quesadilla with chicken, cheese)  # Supplements: 100% 2 Boost Shakes

## 2018-03-19 NOTE — PROGRESS NOTES
Pulmonary Medicine  Cystic Fibrosis - Lung Transplant Daily Progress Note   March 19, 2018       Patient: Kecia Blue  MRN: [id number]  Transplant Date: 2/21/2018 (POD# 18)  Admission date: 2/21/2018  Hospital Day #26          Assessment and Plan:     Kecia Blue is a 56 y/o female w/PMHx significant for dermatomyositis (on immunosuppression), seronegative RA, ILD, and pulmonary hypertension who was admitted for emergent lung transplant w/u on 2/21 for increased SOB and hypoxia. Required V-A ECMO for right heart failure on 2/27. Now s/p bilateral sequential lung transplant on 3/1, ECMO decannulation on 3/3. Post-op course c/b hemodynamic instability requiring pressors and iFlolan (off, but now on PO Revatio). Extubated 3/7, sating well on RA. Making gradual progress overall.        S/p bilateral lung transplant on 3/1/18 for acute hypoxic/hypercapneic respiratory failure 2/2 ILD.  Post-op Pulm HTN 2/2 ILD.   Adequate graft function. Weaned off iFlolan, however still on PO Revatio. Extubated 3/6, sating well on RA. Minimal pulmonary symptoms. Last chest tube removed 3/13. Surveillance bronchoscopy 3/13 revealing bilateral anastomoses intact and patent, thick yellow secretions around right anastomosis, suctioned and removed.   - CXR tomorrow for interval assessement  - Diuretics as noted below  - Continue aggressive pulmonary toilet with acapella/incentive spirometry q 1hr while awake.                     Continue 3-drug immunosuppression:  - Target goal 10-12 (decresaed for RADHA). Increase target back to 10-15 now that RADHA has essentially resolved. Tacrolimus drug level 9.5 today. Increase dose from 5mg BID --> 5mg qAM / 5.5mg qPM.   - MMF 1500 g BID   - Prednisone 13mg qAM / 12mg qPM. Steroid taper below:    Date AM PM   3/15/18 13 12   3/22/18 12.5 10   4/26/18 10 10   5/24/18 10 7.5   6/22/18 7.5 7.5   7/20/18 7.5 5   8/24/18 5 5   9/21/18 5 2.5     Prophylaxis:   PJP: Bactrim  CMV:  vangancyclovir  Fungal: Nystatin        Donor Recipient Intervention   CMV status  Pos   Pos  valgancyclovir    EBV status Pos Pos Immune    HSV status N/A  Pos Immune       Infectious disease.  SIRS, resolved.  No known infectious disease history. Post-operative SIRS likely a response from multiple procedures (VA ECMO cannulation and decannulation) and transplant surgery. Has been afebrile for some time. Negative recipient bronch culture from time of transplant. Don[facility] culture at [facility] grew MRSA. Received 2-week course of vancomycin. Donor culture at OSH grew MRSA, Strep pneumoniae, Moraxella catarrhalis, Haemophilus species. Received 2-week course of pip/tazo.      RADHA.  Volume overload, resolved.  Suspect secondary to meds and diuresis. Serum Cr+ improving nicely. LE edema likely 2/2 low albumin state. Improved with diuretics.  - Bumex PO daily  - Trend BMP daily  - Avoid nephrotoxic agents as possible  - Decrease tacro goal 10-12     Acute anemia.  Hgb slowly drifting down over the past one week. No evidence of bleeding. Likely 2/2 multiple lab draws, bone marrow suppressive medications. Received transfusion 3/15 with appropriate response. Stable hgb upper 8s.  - Trend hgb daily    Steroid-induced hyperglycemia, resolved.  Glucose relatively well controlled with very minimal insulin needs.   - Appreciate Endocrine's assistance managing glucose / insulin needs  - Discontinue SSI, glucose checks today  - Monitor glucose via BMP checks     Dysphagia, resolved.  Noted post-transplant. SLP consulted while inpatient. Transitioned to regular diet on 3/15.    Diarrhea, resolved.  Large bowel loops on imaging.  Pain on defecation.  Diarrhea resolved. Likely 2/2 antibiotics, immunosuppression agents and/or TFs. Pain while defecation could be anal fissure versus hemorrhoid. C diff negative. Abd XR 3/15 with decrease gaseous distention and stool burden. Overall improved.  - Desitin cream daily BID prn  - Imodium  prn    Small-mod L pleural effusion, likely hemothorax.  Noted on Chest CT 3/8. Repeat CXR relatively unchanged in size. Required 1 unit pRBCs on 3/8. Will continue to monitor with imaging. If worsening, could consider VATS versus chest tube placement w/ lytic therapy.         LUE DVT, provoked.  Found on UE ultrasound on 3/7. Received heparin gtt followed by enoxaparin SQ as bridge to therapeutic INR.   - Discontinue enoxaparin now that INR > 2.0  - Warfarin, pharm to dose  - INR goal 2-3. Will need 3-months of anticoagulation.        FEN: Regular diet, electrolyte protocol  PPX: Warfarin, PPI   CODE: Full  DISPO: Inpatient cares. ARU this week pending bed availability.       Patient discussed with Dr. Mcelroy.    ADRIÁN Amato, CNP  Inpatient Nurse Practitioner  [facility]  Pager [phone]        Subjective & Interval History:     Last 24 hour vital signs, labs and care team notes reviewed.    No acute issues overnight. Continues to have great calorie/protein intake. NJ tube removed yesterday. Patient reports feeling well overnight. Endorsing intermittent cough with occasional sputum production. Denies SOB at rest. Increase exercise tolerance. Denies incisional pain.         Review of Systems:     C: no fever, no chills, no change in weight. Adequate appetite.   INTEGUMENTARY/SKIN: no rash or obvious new lesions  ENT/MOUTH: no sore throat, no sinus pain, no nasal drainage  RESP: see interval history  CV: no chest pain, no palpitations, no orthopnea  GI: no nausea, no vomiting, no change in stools, no reflux symptoms  : no dysuria  MUSCULOSKELETAL: no myalgias, no arthralgias  ENDOCRINE: blood sugars with adequate control  NEURO: no headache, no numbness or tingling  PSYCHIATRIC: mood stable          Medications:     Active Medications:    sildenafil  10 mg Oral TID     warfarin  4 mg Oral ONCE at 18:00     tacrolimus  5 mg Oral BID IS     pantoprazole  40 mg Oral QAM     valGANciclovir  900 mg Oral Daily      bumetanide  1 mg Oral Daily     predniSONE  13 mg Oral Daily     predniSONE  12 mg Oral QPM     multivitamins with minerals  15 mL Oral Daily     sulfamethoxazole-trimethoprim  1 tablet Oral or NG Tube Daily     metoprolol tartrate  25 mg Oral BID     QUEtiapine  50 mg Oral At Bedtime     levalbuterol  2 puff Inhalation Q4H JULES     nystatin  1,000,000 Units Swish & Swallow 4x Daily     mycophenolate  1,500 mg Oral BID IS     Active PRN Medications:  polyethylene glycol, Warfarin Therapy Reminder, simethicone, HOLD MEDICATION, zinc oxide, sennosides, potassium phosphate (KPHOS) in D5W IV, potassium phosphate (KPHOS) in D5W IV, potassium phosphate (KPHOS) in D5W IV, potassium phosphate (KPHOS) in D5W IV, potassium phosphate (KPHOS) in D5W IV, artificial tears, metoprolol, IV fluid REPLACEMENT ONLY, naloxone, acetaminophen, oxyCODONE IR, ondansetron **OR** ondansetron, IV fluid REPLACEMENT ONLY, glucose **OR** dextrose **OR** glucagon, potassium chloride, potassium chloride, potassium chloride, potassium chloride with lidocaine, potassium chloride, magnesium sulfate, magnesium sulfate         Physical Exam:     Constitutional: Awake. Alert, in no apparent distress. Sitting at the edge of the bed.  HEENT: Eyes with pink conjunctivae, no scleral jaundice. Oral mucosa moist without lesions. Neck supple without lymphadenopathy.    PULM: Good airflow throughout posterior lung field. Clear lung sounds. No crackles, no rhonchi, no wheezes.   CV: Normal S1 and S2. RRR. No murmur, gallop, or rub. No peripheral edema. Compresion stockings on.  ABD: NABS, soft, nontender, Not distended. No guarding.   MSK: Moves all extremities. No apparent muscle wasting.   NEURO: Alert and oriented x 4, conversant.   SKIN: Warm, dry. No pruritus. No rash on limited exam.   PSYCH: Mood stable, judgment and insight appropriate.?     Lines, Drains, and Devices:  Peripheral IV 03/07/18 Right;Anterior Upper forearm (Active)   Site Assessment WDL  "3/13/2018  4:00 AM   Line Status Infusing 3/13/2018  4:00 AM   Phlebitis Scale 0-->no symptoms 3/13/2018  4:00 AM   Infiltration Scale 0 3/13/2018  4:00 AM   Infiltration Site Treatment Method  None 3/13/2018  4:00 AM   Extravasation? No 3/13/2018  4:00 AM   Number of days:6       Peripheral IV 03/08/18 Right Hand (Active)   Site Assessment WDL 3/13/2018  4:00 AM   Line Status Infusing 3/13/2018  4:00 AM   Phlebitis Scale 0-->no symptoms 3/13/2018  4:00 AM   Infiltration Scale 0 3/13/2018  4:00 AM   Infiltration Site Treatment Method  None 3/13/2018  4:00 AM   Extravasation? No 3/13/2018  4:00 AM   Number of days:5       Peripheral IV 03/10/18 Right Upper forearm (Active)   Site Assessment [facility] 3/13/2018  4:00 AM   Line Status Saline locked 3/13/2018  4:00 AM   Phlebitis Scale 0-->no symptoms 3/13/2018  4:00 AM   Infiltration Scale 0 3/13/2018  4:00 AM   Infiltration Site Treatment Method  None 3/13/2018  4:00 AM   Extravasation? No 3/13/2018  4:00 AM   Number of days:3            Data:     All vital signs, laboratory and imaging data for the past 24 hours reviewed.      Vital signs:  Temp: 97.7  F (36.5  C) Temp src: Oral BP: 118/82   Heart Rate: 104 Resp: 22 SpO2: 94 % O2 Device: None (Room air) Oxygen Delivery: 3 LPM Height: 160 cm (5' 3\") Weight: 58.8 kg (129 lb 9.6 oz)    Weight trend:   Vitals:    03/17/18 0133 03/18/18 0037 03/19/18 0500   Weight: 60.2 kg (132 lb 11.5 oz) 59.7 kg (131 lb 9.8 oz) 58.8 kg (129 lb 9.6 oz)        I/O:     Intake/Output Summary (Last 24 hours) at 03/18/18 0741  Last data filed at 03/18/18 0500   Gross per 24 hour   Intake             1430 ml   Output             2100 ml   Net             -670 ml       Labs    CMP:     Recent Labs  Lab 03/19/18  0450 03/18/18  0534 03/17/18  0604 03/16/18  0639 03/15/18  0440    140 142 142 139   POTASSIUM 5.2 5.3 4.7 4.7 4.3   CHLORIDE 107 106 110* 109 106   CO2 23 26 24 23 23   ANIONGAP 10 8 9 10 10   * 137* 125* 127* 199*   BUN 36* " 30 33* 38* 38*   CR 0.93 0.90 0.93 1.03 1.13*   GFRESTIMATED 62 65 63 56* 50*   GFRESTBLACK 75 78 76 67 60*   CHELSEY 8.8 8.6 8.7 8.5 7.8*   MAG 1.9 1.7 1.7 1.7 2.1   PHOS 3.8 3.0 3.0 2.3* 2.5   ALBUMIN 2.5* 2.4* 2.4*  --  2.4*   ALT 21 18 22  --  22     CBC:     Recent Labs  Lab 03/19/18  0450 03/18/18  0534 03/17/18  0604 03/16/18  0639   WBC 7.2 7.9 8.4 9.2   RBC 2.88* 3.04* 3.08* 2.94*   HGB 8.9* 9.4* 9.4* 9.0*   HCT 29.5* 31.3* 31.0* 29.6*   * 103* 101* 101*   MCH 30.9 30.9 30.5 30.6   MCHC 30.2* 30.0* 30.3* 30.4*   RDW 21.5* 21.5* 21.0* 20.5*    197 212 215     INR:     Recent Labs  Lab 03/19/18  0450 03/18/18  0534 03/17/18  0604 03/16/18  0639   INR 2.48* 1.76* 1.31* 1.36*     Glucose:   Recent Labs  Lab 03/19/18  0450 03/18/18  2326 03/18/18  1750 03/18/18  1316 03/18/18  0906 03/18/18  0534 03/18/18  0042 03/17/18  2114  03/17/18  0604  03/16/18  0639  03/15/18  0440  03/14/18  0640   *  --   --   --   --  137*  --   --   --  125*  --  127*  --  199*  --  152*   BGM  --  163* 147* 132* 158*  --  139* 151*  < >  --   < >  --   < > 192*  < >  --    < > = values in this interval not displayed.  Blood Gas: No lab results found in last 7 days.  Culture Data No results for input(s): CULT in the last 168 hours.  Virology Data:   Lab Results   Component Value Date    FLUAH1 Negative 02/27/2018    FLUAH3 Negative 02/27/2018    DB3793 Negative 02/27/2018    IFLUB Negative 02/27/2018    RSVA Negative 02/27/2018    RSVB Negative 02/27/2018    PIV1 Negative 02/27/2018    PIV2 Negative 02/27/2018    PIV3 Negative 02/27/2018    HMPV Negative 02/27/2018    HRVS Negative 02/27/2018    ADVBE Negative 02/27/2018    ADVC Negative 02/27/2018     Historical CMV results (last 3 of prior testing):  Lab Results   Component Value Date    CMVQNT Canceled, Test credited (A) 03/01/2018    CMVQNT Canceled, Test credited (A) 03/01/2018     Lab Results   Component Value Date    CMVLOG Canceled, Test credited 03/01/2018     CMVLOG Canceled, Test credited 03/01/2018     Urine Studies    Recent Labs   Lab Test  03/04/18   1425   URINEPH  5.5   NITRITE  Negative   LEUKEST  Negative   WBCU  5

## 2018-03-19 NOTE — PLAN OF CARE
Problem: Patient Care Overview  Goal: Plan of Care/Patient Progress Review  Discharge Planner OT   Patient plan for discharge: ARU   Current status: Pt with significant proximal weakness limiting independence with functional transfers. Pt completed x12 sit > stands from various surface heights. Pt CGA for sit > stand from EOB and mod A for sit > stand from chair and toilet.   Barriers to return to prior living situation: sternal precautions, proximal weakness, deconditioning   Recommendations for discharge: ARU   Rationale for recommendations: Pt motivated in therapies, well below baseline in ADLs/IADLs, and multidisciplinary goals.        Entered by: Samanta Ortiz 03/19/2018 4:04 PM

## 2018-03-19 NOTE — PLAN OF CARE
Problem: Patient Care Overview  Goal: Plan of Care/Patient Progress Review  Outcome: No Change  Neuro: A&Ox4. Pleasant and cooperative.  Cardiac: SR/ST. HR . SBP stable. Afebrile.   Respiratory: Sating>90% on RA. Denies SOB.  GI/: Adequate urine output. Multiple loose stools. Using bedside commode.  Diet/appetite: Tolerating regular diet. Eating well. NJ removed. Continue with calorie counts.  Activity:  Assist of 1, up to chair and in halls.  Pain: At acceptable level on current regimen.   Skin: Incisions C/D/I. Groin site WDL.  LDA's: Peripherals x2.     Humulin and ACHS insulin D/C. AM lab blood sugar checks and prior to supper with sliding scale. Mg replaced, recheck tomorrow. Plan for D/C to rehab. Will continue to monitor.    Plan: Continue with POC. Notify primary team with changes.

## 2018-03-19 NOTE — PROGRESS NOTES
Diabetes Consult Daily  Progress Note          Assessment/Plan:     Ms. Kecia Blue is a 57-year-old woman with a history of ILD and pulmonary hypertension, dermatomyositis, RA, and Raynauds syndrome, who is admitted for emergent lung transplant workup on 2/21 for increased shortness of breath and hypoxia, required VA ECMO for R heart failure, now s/p bilateral sequential lung transplant on 3/1.  No history of diabetes.  Kecia is experiencing hyperglycemia secondary to enteral feeds and steroids.     Glucoses in the low to mid 100s off most insulin (BG up to 160s last night was checked shortly after pt finished supplement shake).    Plan:  -Discussed with pt and pulmonary team: Given difficulty getting POCT glucoses (can't use fingertips due to Raynaud's, inconsistently getting sample from PIV, painful getting checks from ear lobes), and given that glucoses have been in/near target range with minimal to no insulin and good po intake, decision was made to cancel all POCT glucose checks and correction aspart at this time.  -If steroid dose increased more frequent glucose checks will have to resume.    DM team will sign off.  Please let us know if further assistance needed with glucose management.     Plan discussed with patient and family, bedside RN, and pulmonary team.             Interval History:   The last 24 hours progress and nursing notes reviewed.  Cycled Nutren 70 cc/h [id number].  Prednisone adjusted to 13 + 12 on 3/15, then taper next on 3/22  Kecia is feeling well today.  Discharge to ARU postponed until tomorrow due to bed availability.  Kecia ended up getting more glucose checks than ordered last night-- presupper was 147 and HS (soon after consuming a supplement shake) was in the 160s.  I reviewed with pt and later with pulmonary team.  Given difficulty getting POCT glucose, decision was made to cancel all POCT glucose checks and correction aspart at this time.  Glucose  "will still be monitored on morning lab- tomorrow and then 2x/week at ARU.  Pulmonary team aware that if steroids were increased she would need more frequent glucose checks again.      Expecting ARU at discharge (likely tomorrow).        Recent Labs  Lab 03/19/18  0450 03/18/18  2326 03/18/18  1750 03/18/18  1316 03/18/18  0906 03/18/18  0534 03/18/18  0042 03/17/18  2114  03/17/18  0604  03/16/18  0639  03/15/18  0440  03/14/18  0640   *  --   --   --   --  137*  --   --   --  125*  --  127*  --  199*  --  152*   BGM  --  163* 147* 132* 158*  --  139* 151*  < >  --   < >  --   < > 192*  < >  --    < > = values in this interval not displayed.            Review of Systems:   See interval hx          Medications:       Active Diet Order      Regular Diet Adult      Advance Diet as Tolerated     Physical Exam:  Gen: Sitting up in chair, NAD>  present.  HEENT: NC/AT, mucous membranes are moist, NJ removed.  Resp: Unlabored at rest  Neuro:alert and oriented, communicating clearly.  /82 (BP Location: Right arm)  Pulse 106  Temp 97.7  F (36.5  C) (Oral)  Resp 22  Ht 1.6 m (5' 3\")  Wt 58.8 kg (129 lb 9.6 oz)  SpO2 94%  BMI 22.96 kg/m2           Data:     Lab Results   Component Value Date    A1C 5.5 03/01/2018    A1C Canceled, Test credited 03/01/2018    A1C 5.3 02/27/2018            Recent Labs   Lab Test  03/19/18   0450  03/18/18   0534   NA  139  140   POTASSIUM  5.2  5.3   CHLORIDE  107  106   CO2  23  26   ANIONGAP  10  8   GLC  115*  137*   BUN  36*  30   CR  0.93  0.90   CHELSEY  8.8  8.6     CBC RESULTS:   Recent Labs   Lab Test  03/19/18   0450   WBC  7.2   RBC  2.88*   HGB  8.9*   HCT  29.5*   MCV  102*   MCH  30.9   MCHC  30.2*   RDW  21.5*   PLT  231       Kaitlynn Chin PA-C [phone]  Diabetes Management job code [id number]          "

## 2018-03-19 NOTE — PROGRESS NOTES
Transplant Social Work Services Progress Note      Date of Initial Social Work Evaluation: 2/20/2018  Collaborated with: rehab admissions, pulmonary team, bedside RN.    Data: patient medically ready for ARU today.    [facility]  [facility].  Hopefully tomorrow.  Updated pulmonary team and bedside nurse who will update patient.     Assessment: medically stable.  Deconditioned.    Education provided by SW: n/a  Plan:    [facility]      Barriers to d/c plan: bed availability    Follow up Plan: will work for placement.

## 2018-03-20 ENCOUNTER — APPOINTMENT (OUTPATIENT)
Dept: GENERAL RADIOLOGY | Facility: CLINIC | Age: 58
DRG: 003 | End: 2018-03-20
Attending: SURGERY
Payer: COMMERCIAL

## 2018-03-20 ENCOUNTER — APPOINTMENT (OUTPATIENT)
Dept: CARDIOLOGY | Facility: CLINIC | Age: 58
DRG: 003 | End: 2018-03-20
Attending: NURSE PRACTITIONER
Payer: COMMERCIAL

## 2018-03-20 ENCOUNTER — APPOINTMENT (OUTPATIENT)
Dept: OCCUPATIONAL THERAPY | Facility: CLINIC | Age: 58
DRG: 003 | End: 2018-03-20
Attending: INTERNAL MEDICINE
Payer: COMMERCIAL

## 2018-03-20 ENCOUNTER — APPOINTMENT (OUTPATIENT)
Dept: PHYSICAL THERAPY | Facility: CLINIC | Age: 58
DRG: 003 | End: 2018-03-20
Attending: INTERNAL MEDICINE
Payer: COMMERCIAL

## 2018-03-20 LAB
ALBUMIN SERPL-MCNC: 2.5 G/DL (ref 3.4–5)
ALT SERPL W P-5'-P-CCNC: 25 U/L (ref 0–50)
ANION GAP SERPL CALCULATED.3IONS-SCNC: 10 MMOL/L (ref 3–14)
BUN SERPL-MCNC: 34 MG/DL (ref 7–30)
CALCIUM SERPL-MCNC: 8.7 MG/DL (ref 8.5–10.1)
CHLORIDE SERPL-SCNC: 106 MMOL/L (ref 94–109)
CO2 SERPL-SCNC: 23 MMOL/L (ref 20–32)
CREAT SERPL-MCNC: 0.94 MG/DL (ref 0.52–1.04)
ERYTHROCYTE [DISTWIDTH] IN BLOOD BY AUTOMATED COUNT: 21.4 % (ref 10–15)
GFR SERPL CREATININE-BSD FRML MDRD: 62 ML/MIN/1.7M2
GLUCOSE SERPL-MCNC: 121 MG/DL (ref 70–99)
HCT VFR BLD AUTO: 31.3 % (ref 35–47)
HGB BLD-MCNC: 9.6 G/DL (ref 11.7–15.7)
INR PPP: 2.82 (ref 0.86–1.14)
MAGNESIUM SERPL-MCNC: 1.7 MG/DL (ref 1.6–2.3)
MCH RBC QN AUTO: 31.3 PG (ref 26.5–33)
MCHC RBC AUTO-ENTMCNC: 30.7 G/DL (ref 31.5–36.5)
MCV RBC AUTO: 102 FL (ref 78–100)
PHOSPHATE SERPL-MCNC: 3.7 MG/DL (ref 2.5–4.5)
PLATELET # BLD AUTO: 250 10E9/L (ref 150–450)
POTASSIUM SERPL-SCNC: 5 MMOL/L (ref 3.4–5.3)
RBC # BLD AUTO: 3.07 10E12/L (ref 3.8–5.2)
SODIUM SERPL-SCNC: 139 MMOL/L (ref 133–144)
WBC # BLD AUTO: 5.4 10E9/L (ref 4–11)

## 2018-03-20 PROCEDURE — 25000125 ZZHC RX 250: Performed by: NURSE PRACTITIONER

## 2018-03-20 PROCEDURE — 71046 X-RAY EXAM CHEST 2 VIEWS: CPT

## 2018-03-20 PROCEDURE — 97110 THERAPEUTIC EXERCISES: CPT | Mod: GO

## 2018-03-20 PROCEDURE — 25000132 ZZH RX MED GY IP 250 OP 250 PS 637: Performed by: THORACIC SURGERY (CARDIOTHORACIC VASCULAR SURGERY)

## 2018-03-20 PROCEDURE — 25000131 ZZH RX MED GY IP 250 OP 636 PS 637: Performed by: SURGERY

## 2018-03-20 PROCEDURE — 85610 PROTHROMBIN TIME: CPT | Performed by: INTERNAL MEDICINE

## 2018-03-20 PROCEDURE — 25000132 ZZH RX MED GY IP 250 OP 250 PS 637: Performed by: INTERNAL MEDICINE

## 2018-03-20 PROCEDURE — 93308 TTE F-UP OR LMTD: CPT

## 2018-03-20 PROCEDURE — 25000132 ZZH RX MED GY IP 250 OP 250 PS 637: Performed by: NURSE PRACTITIONER

## 2018-03-20 PROCEDURE — 80069 RENAL FUNCTION PANEL: CPT | Performed by: INTERNAL MEDICINE

## 2018-03-20 PROCEDURE — 25000132 ZZH RX MED GY IP 250 OP 250 PS 637: Performed by: SURGERY

## 2018-03-20 PROCEDURE — 97530 THERAPEUTIC ACTIVITIES: CPT | Mod: GP | Performed by: REHABILITATION PRACTITIONER

## 2018-03-20 PROCEDURE — 97535 SELF CARE MNGMENT TRAINING: CPT | Mod: GO

## 2018-03-20 PROCEDURE — 83735 ASSAY OF MAGNESIUM: CPT | Performed by: INTERNAL MEDICINE

## 2018-03-20 PROCEDURE — 25000132 ZZH RX MED GY IP 250 OP 250 PS 637: Performed by: ANESTHESIOLOGY

## 2018-03-20 PROCEDURE — 97116 GAIT TRAINING THERAPY: CPT | Mod: GP | Performed by: REHABILITATION PRACTITIONER

## 2018-03-20 PROCEDURE — 93321 DOPPLER ECHO F-UP/LMTD STD: CPT | Mod: 26 | Performed by: INTERNAL MEDICINE

## 2018-03-20 PROCEDURE — 25000131 ZZH RX MED GY IP 250 OP 636 PS 637: Performed by: NURSE PRACTITIONER

## 2018-03-20 PROCEDURE — 40000193 ZZH STATISTIC PT WARD VISIT: Performed by: REHABILITATION PRACTITIONER

## 2018-03-20 PROCEDURE — 93325 DOPPLER ECHO COLOR FLOW MAPG: CPT | Mod: 26 | Performed by: INTERNAL MEDICINE

## 2018-03-20 PROCEDURE — 84460 ALANINE AMINO (ALT) (SGPT): CPT | Performed by: INTERNAL MEDICINE

## 2018-03-20 PROCEDURE — 40000133 ZZH STATISTIC OT WARD VISIT

## 2018-03-20 PROCEDURE — 93308 TTE F-UP OR LMTD: CPT | Mod: 26 | Performed by: INTERNAL MEDICINE

## 2018-03-20 PROCEDURE — 25000131 ZZH RX MED GY IP 250 OP 636 PS 637: Performed by: THORACIC SURGERY (CARDIOTHORACIC VASCULAR SURGERY)

## 2018-03-20 PROCEDURE — 36415 COLL VENOUS BLD VENIPUNCTURE: CPT | Performed by: INTERNAL MEDICINE

## 2018-03-20 PROCEDURE — 85027 COMPLETE CBC AUTOMATED: CPT | Performed by: INTERNAL MEDICINE

## 2018-03-20 PROCEDURE — 25000125 ZZHC RX 250: Performed by: SURGERY

## 2018-03-20 PROCEDURE — 12000006 ZZH R&B IMCU INTERMEDIATE UMMC

## 2018-03-20 RX ORDER — WARFARIN SODIUM 4 MG/1
4 TABLET ORAL
Status: DISCONTINUED | OUTPATIENT
Start: 2018-03-20 | End: 2018-03-20

## 2018-03-20 RX ORDER — MULTIPLE VITAMINS W/ MINERALS TAB 9MG-400MCG
1 TAB ORAL DAILY
Status: DISCONTINUED | OUTPATIENT
Start: 2018-03-20 | End: 2018-03-24 | Stop reason: HOSPADM

## 2018-03-20 RX ORDER — MAGNESIUM OXIDE 400 MG/1
400 TABLET ORAL DAILY
Status: DISCONTINUED | OUTPATIENT
Start: 2018-03-20 | End: 2018-03-24 | Stop reason: HOSPADM

## 2018-03-20 RX ORDER — BUMETANIDE 1 MG/1
1 TABLET ORAL ONCE
Status: COMPLETED | OUTPATIENT
Start: 2018-03-20 | End: 2018-03-20

## 2018-03-20 RX ADMIN — BUMETANIDE 1 MG: 1 TABLET ORAL at 18:44

## 2018-03-20 RX ADMIN — PREDNISONE 12.5 MG: 2.5 TABLET ORAL at 18:35

## 2018-03-20 RX ADMIN — NYSTATIN 1000000 UNITS: 500000 SUSPENSION ORAL at 20:03

## 2018-03-20 RX ADMIN — TACROLIMUS 5.5 MG: 5 CAPSULE ORAL at 18:35

## 2018-03-20 RX ADMIN — LEVALBUTEROL TARTRATE 2 PUFF: 45 AEROSOL, METERED ORAL at 03:27

## 2018-03-20 RX ADMIN — LEVALBUTEROL TARTRATE 2 PUFF: 45 AEROSOL, METERED ORAL at 16:49

## 2018-03-20 RX ADMIN — MYCOPHENOLATE MOFETIL 1500 MG: 250 CAPSULE ORAL at 18:44

## 2018-03-20 RX ADMIN — LEVALBUTEROL TARTRATE 2 PUFF: 45 AEROSOL, METERED ORAL at 12:14

## 2018-03-20 RX ADMIN — Medication 10 MG: at 20:03

## 2018-03-20 RX ADMIN — SULFAMETHOXAZOLE AND TRIMETHOPRIM 1 TABLET: 400; 80 TABLET ORAL at 08:16

## 2018-03-20 RX ADMIN — METOPROLOL TARTRATE 25 MG: 25 TABLET, FILM COATED ORAL at 12:14

## 2018-03-20 RX ADMIN — PANTOPRAZOLE SODIUM 40 MG: 40 TABLET, DELAYED RELEASE ORAL at 08:16

## 2018-03-20 RX ADMIN — PREDNISONE 13 MG: 1 TABLET ORAL at 08:14

## 2018-03-20 RX ADMIN — Medication 10 MG: at 08:21

## 2018-03-20 RX ADMIN — VALGANCICLOVIR 900 MG: 450 TABLET, FILM COATED ORAL at 08:15

## 2018-03-20 RX ADMIN — MAGNESIUM OXIDE TAB 400 MG (241.3 MG ELEMENTAL MG) 400 MG: 400 (241.3 MG) TAB at 16:49

## 2018-03-20 RX ADMIN — NYSTATIN 1000000 UNITS: 500000 SUSPENSION ORAL at 16:49

## 2018-03-20 RX ADMIN — LEVALBUTEROL TARTRATE 2 PUFF: 45 AEROSOL, METERED ORAL at 08:16

## 2018-03-20 RX ADMIN — MULTIPLE VITAMINS W/ MINERALS TAB 1 TABLET: TAB at 09:38

## 2018-03-20 RX ADMIN — TACROLIMUS 5 MG: 5 CAPSULE ORAL at 08:15

## 2018-03-20 RX ADMIN — LEVALBUTEROL TARTRATE 2 PUFF: 45 AEROSOL, METERED ORAL at 20:03

## 2018-03-20 RX ADMIN — NYSTATIN 1000000 UNITS: 500000 SUSPENSION ORAL at 08:16

## 2018-03-20 RX ADMIN — MYCOPHENOLATE MOFETIL 1500 MG: 250 CAPSULE ORAL at 08:14

## 2018-03-20 RX ADMIN — NYSTATIN 1000000 UNITS: 500000 SUSPENSION ORAL at 12:14

## 2018-03-20 RX ADMIN — Medication 1 TABLET: at 18:36

## 2018-03-20 RX ADMIN — QUETIAPINE 50 MG: 50 TABLET ORAL at 22:03

## 2018-03-20 RX ADMIN — BUMETANIDE 1 MG: 1 TABLET ORAL at 08:14

## 2018-03-20 RX ADMIN — Medication 10 MG: at 13:53

## 2018-03-20 ASSESSMENT — ACTIVITIES OF DAILY LIVING (ADL)
ADLS_ACUITY_SCORE: 11
ADLS_ACUITY_SCORE: 15
ADLS_ACUITY_SCORE: 15
ADLS_ACUITY_SCORE: 11

## 2018-03-20 NOTE — DISCHARGE INSTRUCTIONS
[facility] Pulmonary Team to follow 2-3 X per week    LABS: every Monday and Thursday, CBC, BMP, MG, PHOS, TACROLIMUS DRUG LEVEL, INR    A FEW DAYS B/4 DISCHARGE FROM REHAB, CONTACT PATIENT'S OUT PATIENT LUNG TRANSPLANT COORDINATOR:  [phone] TO ARRANGE FOR A RETURN TO PULMONARY CLINIC APPOINTMENT WITHIN 1 WEEK OR LASS FROM TIME OF DC FROM REHAB FACILITY.    PLEASE MAKE ARRANGEMENTS FOR PATIENT TO BEGIN OUT PATIENT PULMONARY REHAB PROGRAM @ OUT PATIENT PULMONARY REHAB  [address]  [address]  [phone] EXT #2

## 2018-03-20 NOTE — PLAN OF CARE
Problem: Patient Care Overview  Goal: Plan of Care/Patient Progress Review  Outcome: No Change  Neuro: A&Ox4.   Cardiac: Normal Sinus Rhythm. VSS.  Respiratory: Sating 95-99% on RA.  GI/: Adequate urine output.  Diet/appetite: Tolerating regular diet.   Activity:  Assist of 1, up to chair and in halls.  Pain: Denies any pain.   Skin: Intact, no new deficits noted.   LDA's:  PIV x 2 saline locked.     Sutures attempted to be removed. RN unsuccessful. MD updated.     Plan: Continue with POC. Notify primary team with changes.

## 2018-03-20 NOTE — PROGRESS NOTES
Transplant Social Work Services Progress Note      Date of Initial Social Work Evaluation: 2/20/2018  Collaborated with: pulm team.  [facility] rehab admission    Data: per pulm team, patient needs fluid drained around lung.  Will not be ready for d/c to rehab until Thursday.    Intervention: updated rehab admissions.  [facility]elled [facility].  Spent time with patient and .  Updated on d/c plans.  Supportive counseling around patient's journey over past month.  She admits to feeling overwhelmed and sad at times.  Normalized feelings.  Encouraged her to continue talking to family and friends.  Absorb info of what she has been through, little by little.    Assessment: coping well.  Well supported by family.  deconditioned  Education provided by SW: transfer process.  Adjustment to illness.    Plan:    Discharge Plans in Progress: to [facility] when medically stable.      Barriers to d/c plan: needs procedure.      Follow up Plan: will continue to follow for support, counseling, education and resources and d/c planning.

## 2018-03-20 NOTE — PROGRESS NOTES
Pulmonary Medicine  Cystic Fibrosis - Lung Transplant Daily Progress Note   March 20, 2018       Patient: Kecia Blue  MRN: [id number]  Transplant Date: 3/1/2018 (POD# 19)  Admission date: 2/21/2018  Hospital Day #27          Assessment and Plan:     Kecia Blue is a 56 y/o female w/PMHx significant for dermatomyositis (on immunosuppression), seronegative RA, ILD, and pulmonary hypertension who was admitted for emergent lung transplant w/u on 2/21 for increased SOB and hypoxia. Required V-A ECMO for right heart failure on 2/27. Now s/p BSLT on 3/1 with ECMO decannulation on 3/3. Post-op course c/b hemodynamic instability requiring pressors and iFlolan (off, but now on PO Revatio). Extubated POD #5 and now weaned to RA with continued improvement in conditioning.  Persistent bilateral pleural effusions now requiring thoracentesis on the right prior to discharge to ARU.       Today's plan:  - IR for right thoracentesis tomorrow, left if indicated per interventionalist, labs and cultures ordered  - Warfarin on hold tonight, will reverse in AM if indicated and pt. on IR schedule, goal INR <2 for procedure  - Echo ordered  - Continue 3-drug IST, tacro level and prednisone taper 3/22  - Additional PO Bumex dose today  - PO mag supplement started  - ARU this week after thoracentesis and pending bed availability     S/p bilateral sequential lung transplant (3/1/18) for acute hypoxic/hypercapneic respiratory failure 2/2 ILD:  Post-op Pulm HTN 2/2 ILD:   Bilateral pleural effusion  Adequate graft function. Weaned off iFlolan but continues on PO Revatio. Extubated 3/6 and has remained stable on RA with only mild DESHPANDE.  Last chest tube removed 3/13. Surveillance bronchoscopy (3/13) with bilateral anastomoses intact and patent, thick yellow secretions around right anastomosis, suctioned and removed. Pleural effusion noted on imaging, possible hemothorax given drifting hgb (see below). CXR today with persistent  bilateral effusions R>L with right increased in size, continued opacities to left mid lung, and slightly increased pulmonary edema.  - IR for right thoracentesis tomorrow, left if indicated per interventionalist, labs and cultures ordered  - Echo today as interval f/u to 3/8 echo  - Diuretics as noted below  - Continue aggressive pulmonary toilet with acapella/incentive spirometry q 1hr while awake.                      Continue 3-drug immunosuppression:  - Tacrolimus 5 mg qAM / 5.5 mg qPM.  Target goal 10-15.  Last level 9.5 (3/19), dose increased.  Next level 3/22 (needs to be ordered).  - MMF 1500 g BID   - Prednisone 12.5 mg BID, next taper 3/22 (needs to be ordered). Steroid taper below:  Date AM (mg) PM (mg)   3/15/18 12.5 12.5   3/22/18 12.5 10   4/26/18 10 10   5/24/18 10 7.5   6/22/18 7.5 7.5   7/20/18 7.5 5   8/24/18 5 5   9/21/18 5 2.5      Prophylaxis:   PJP: Bactrim  CMV: vangancyclovir  Fungal: Nystatin     Donor Recipient Intervention   CMV status  Pos   Pos  valgancyclovir POD #8-90   EBV status Pos Pos Immune    HSV status N/A  Pos Immune       Infectious disease:  SIRS, Resolved:  No known infectious disease history. Post-operative SIRS likely a response from multiple procedures (VA ECMO cannulation and decannulation) and transplant surgery. Remains afebrile and without leukocytosis. Negative recipient bronch culture from time of transplant.  Donor culture at OSH grew MRSA, Strep pneumoniae, Moraxella catarrhalis, and Haemophilus species. Donor culture at [facility] grew MRSA. S/p 2-week course of vancomycin and pip/tazo.       RADHA, Resolved:  Volume overload, Resolved:  Suspect secondary to meds and diuresis. Serum Cr+ and BLE edema now back to WNL. LE edema likely 2/2 low albumin state. Improved with diuretics.  - Bumex 1 mg PO daily with additional 1 mg PO dose today  - Trend BMP daily  - Avoid nephrotoxic agents as possible    LUE DVT, Provoked:  Noted on UE ultrasound (3/7). Received heparin gtt  followed by enoxaparin SQ as bridge to therapeutic INR.   - Warfarin, pharm to dose, holding today with plans for IR tomorrow, procedure goal INR <2.  Will give additional reversal agents in the morning if indicated and IR schedule confirmed  - INR goal 2-3. Will need 3-months of anticoagulation      Acute anemia:  Hgb with slow drift to 7's.  No evidence of bleeding. Likely 2/2 multiple lab draws, bone marrow suppressive medications. Received transfusion 3/8 and 3/15 with appropriate response. Stable hgb upper 8s.  - Trend hgb daily    Hypomagnesemia: Likely r/t CNI.  - Mg oxide 400 mg daily     Steroid-induced hyperglycemia, Resolved:  Glucose relatively well controlled with very minimal insulin needs. BG/insulin stopped 3/19 with stable BG and difficulty getting POCT testing d/t Raynaud's.  Endocrine consulted.  - Monitor glucose via BMP checks      Dysphagia, Resolved:  Noted post-transplant. SLP consulted while inpatient. Transitioned to regular diet on 3/15.    Diarrhea, Resolved:  Large bowel loops on imaging:  Painful defecation:   Likely 2/2 antibiotics, immunosuppression agents and/or TFs. Pain while defecation could be anal fissure versus hemorrhoid. C diff negative. Abd XR (3/15) with decrease gaseous distention and stool burden. Overall improved but gas bubble persists on XR.  - Desitin cream daily BID prn  - Imodium prn       FEN: Regular diet, electrolyte protocol  PPX: Warfarin, PPI   CODE: Full  DISPO: Inpatient cares. ARU this week after thoracentesis and pending bed availability.     Patient discussed with Dr. Hood.    Hina Huff, DNP, APRN, CNP  Inpatient Nurse Practitioner  [facility]  Pager [phone]  Page attending on service 5-6 pm  After 6 pm weekdays and all day weekends: pager [phone]        Subjective & Interval History:     Last 24 hours of care team notes reviewed.    Remains stable on RA without acute complaints, mild DESHPANDE with activity and minimal cough.  No pain.  Eating well  with supplemental shakes, no nausea, infrequent loose stools.  Poor sleep last night, had trouble getting back to sleep after waking for no reason.           Review of Systems:     C: no fever, no chills, no change in weight, improving appetite  INTEGUMENTARY/SKIN: no rash or obvious new lesions  ENT/MOUTH: no sore throat, no sinus pain, no nasal drainage  RESP: see interval history  CV: no chest pain, no palpitations, no peripheral edema, no orthopnea  GI: no nausea, no vomiting, no change in stools, no reflux symptoms  : no dysuria  MUSCULOSKELETAL: no myalgias, no arthralgias  ENDOCRINE: blood sugars with adequate control  NEURO: no headache, no numbness or tingling  PSYCHIATRIC: mood stable          Medications:     Active Medications:    multivitamin, therapeutic with minerals  1 tablet Oral Daily     predniSONE  12.5 mg Oral BID w/meals     warfarin  4 mg Oral ONCE at 18:00     bumetanide  1 mg Oral Once     calcium-vitamin D  1 tablet Oral BID w/meals     magnesium oxide  400 mg Oral Daily     sildenafil  10 mg Oral TID     tacrolimus  5 mg Oral QAM     tacrolimus  5.5 mg Oral QPM     pantoprazole  40 mg Oral QAM     valGANciclovir  900 mg Oral Daily     bumetanide  1 mg Oral Daily     sulfamethoxazole-trimethoprim  1 tablet Oral or NG Tube Daily     metoprolol tartrate  25 mg Oral BID     QUEtiapine  50 mg Oral At Bedtime     levalbuterol  2 puff Inhalation Q4H [facility]     nystatin  1,000,000 Units Swish & Swallow 4x Daily     mycophenolate  1,500 mg Oral BID IS     Active PRN Medications:  HOLD MEDICATION, polyethylene glycol, Warfarin Therapy Reminder, potassium phosphate (KPHOS) in D5W IV, potassium phosphate (KPHOS) in D5W IV, potassium phosphate (KPHOS) in D5W IV, potassium phosphate (KPHOS) in D5W IV, potassium phosphate (KPHOS) in D5W IV, naloxone, acetaminophen, ondansetron **OR** ondansetron, glucose **OR** dextrose **OR** glucagon, potassium chloride, potassium chloride, potassium chloride, potassium  "chloride with lidocaine, potassium chloride, magnesium sulfate, magnesium sulfate         Physical Exam:     Constitutional: Lying in bed, in no apparent distress.   HEENT: Eyes with pink conjunctivae, anicteric.  Oral mucosa moist without lesions. Neck supple without lymphadenopathy.   PULM: Good air flow bilaterally.  Scattered crackles greatest in bases, no rhonchi, no wheezes. Non-labored breathing on RA.  CV: Normal S1 and S2. RRR.  No murmur, gallop, or rub.  No peripheral edema.   ABD: NABS, soft, nontender, nondistended.  No guarding.   MSK: Moves all extremities.  No apparent muscle wasting.   NEURO: Alert and oriented x 4, conversant.   SKIN: Warm, dry.  No rash on limited exam.   PSYCH: Mood stable.?     Lines, Drains, and Devices:  Peripheral IV 03/10/18 Right Upper forearm (Active)   Site Assessment [facility] 3/20/2018 12:15 PM   Line Status Saline locked 3/20/2018 12:15 PM   Phlebitis Scale 0-->no symptoms 3/20/2018 12:15 PM   Infiltration Scale 0 3/20/2018 12:15 PM   Infiltration Site Treatment Method  None 3/16/2018 11:30 AM   Extravasation? No 3/20/2018 12:15 PM   Number of days:10       Peripheral IV 03/15/18 Right;Posterior Lower forearm (Active)   Site Assessment [facility] 3/20/2018 12:15 PM   Line Status Saline locked 3/20/2018 12:15 PM   Phlebitis Scale 0-->no symptoms 3/20/2018 12:15 PM   Infiltration Scale 0 3/20/2018 12:15 PM   Infiltration Site Treatment Method  None 3/16/2018 11:30 AM   Extravasation? No 3/20/2018 12:15 PM   Number of days:5          Data:     All vital signs, laboratory and imaging data for the past 24 hours reviewed.      Vital signs:  Temp: 97.8  F (36.6  C) Temp src: Oral BP: 118/86   Heart Rate: 109 Resp: 20 SpO2: 92 % O2 Device: None (Room air) Oxygen Delivery: 3 LPM Height: 160 cm (5' 3\") Weight: 57.4 kg (126 lb 8.7 oz)    Pain: # Pain Assessment:   Current Pain Score 3/20/2018 3/20/2018 3/19/2018   Patient currently in pain? denies denies denies   Pain location - - -   Pain " descriptors - - -   CPOT pain score - - -   Kecia rivas pain level was assessed and she currently denies pain.      Weight trend:   Vitals:    03/18/18 0037 03/19/18 0500 03/20/18 0330   Weight: 59.7 kg (131 lb 9.8 oz) 58.8 kg (129 lb 9.6 oz) 57.4 kg (126 lb 8.7 oz)        I/O:   Intake/Output Summary (Last 24 hours) at 03/20/18 1503  Last data filed at 03/20/18 1000   Gross per 24 hour   Intake              480 ml   Output              800 ml   Net             -320 ml       Labs    CMP:   Recent Labs  Lab 03/20/18  0456 03/19/18  0450 03/18/18  0534 03/17/18  0604    139 140 142   POTASSIUM 5.0 5.2 5.3 4.7   CHLORIDE 106 107 106 110*   CO2 23 23 26 24   ANIONGAP 10 10 8 9   * 115* 137* 125*   BUN 34* 36* 30 33*   CR 0.94 0.93 0.90 0.93   GFRESTIMATED 62 62 65 63   GFRESTBLACK 75 75 78 76   CHELSEY 8.7 8.8 8.6 8.7   MAG 1.7 1.9 1.7 1.7   PHOS 3.7 3.8 3.0 3.0   ALBUMIN 2.5* 2.5* 2.4* 2.4*   ALT 25 21 18 22       CBC:   Recent Labs  Lab 03/20/18  0456 03/19/18  0450 03/18/18  0534 03/17/18  0604   WBC 5.4 7.2 7.9 8.4   RBC 3.07* 2.88* 3.04* 3.08*   HGB 9.6* 8.9* 9.4* 9.4*   HCT 31.3* 29.5* 31.3* 31.0*   * 102* 103* 101*   MCH 31.3 30.9 30.9 30.5   MCHC 30.7* 30.2* 30.0* 30.3*   RDW 21.4* 21.5* 21.5* 21.0*    231 197 212       INR:   Recent Labs  Lab 03/20/18  0456 03/19/18  0450 03/18/18  0534 03/17/18  0604   INR 2.82* 2.48* 1.76* 1.31*       Glucose:   Recent Labs  Lab 03/20/18  0456 03/19/18  0450 03/18/18  2326 03/18/18  1750 03/18/18  1316 03/18/18  0906 03/18/18  0534 03/18/18  0042 03/17/18  2114  03/17/18  0604  03/16/18  0639  03/15/18  0440   * 115*  --   --   --   --  137*  --   --   --  125*  --  127*  --  199*   BGM  --   --  163* 147* 132* 158*  --  139* 151*  < >  --   < >  --   < > 192*   < > = values in this interval not displayed.    Blood Gas: No lab results found in last 7 days.    Culture Data: No results for input(s): CULT in the last 168 hours.    Virology Data: Lab  Results   Component Value Date    FLUAH1 Negative 02/27/2018    FLUAH3 Negative 02/27/2018    [id number] Negative 02/27/2018    IFLUB Negative 02/27/2018    RSVA Negative 02/27/2018    RSVB Negative 02/27/2018    PIV1 Negative 02/27/2018    PIV2 Negative 02/27/2018    PIV3 Negative 02/27/2018    HMPV Negative 02/27/2018    HRVS Negative 02/27/2018    ADVBE Negative 02/27/2018    ADVC Negative 02/27/2018       Historical CMV results (last 3 of prior testing):  Lab Results   Component Value Date    CMVQNT Canceled, Test credited (A) 03/01/2018    CMVQNT Canceled, Test credited (A) 03/01/2018     Lab Results   Component Value Date    CMVLOG Canceled, Test credited 03/01/2018    CMVLOG Canceled, Test credited 03/01/2018       Urine Studies  Recent Labs   Lab Test  03/04/18   1425   URINEPH  5.5   NITRITE  Negative   LEUKEST  Negative   WBCU  5

## 2018-03-20 NOTE — PLAN OF CARE
"Problem: Patient Care Overview  Goal: Plan of Care/Patient Progress Review  Outcome: Improving  /89 (BP Location: Right arm)  Pulse 106  Temp 97.7  F (36.5  C) (Oral)  Resp 24  Ht 1.6 m (5' 3\")  Wt 57.4 kg (126 lb 8.7 oz)  SpO2 96%  BMI 22.42 kg/m2     Neuro: A&Ox4.   Cardiac: SR. VSS.   Respiratory: Sating 96% on RA.  GI/: Adequate urine output. BM loose X1  Diet/appetite: Tolerating regular diet and calorie count.   Activity:  Stand-by assist to the commode.  Pain: Denies  Skin: Intact, no new deficits noted.  LDA's: 2 R PIV SL    Plan: Patient is to D/C to ARU if bed available. Notify primary team with changes.      "

## 2018-03-20 NOTE — PHARMACY-ANTICOAGULATION SERVICE
Warfarin Therapy Hold Note  This patient is currently receiving warfarin for LUE provoked DVT.    Goal INR:  2-3.      Anticoagulation Dose History     Recent Dosing and Labs Latest Ref Rng & Units 3/14/2018 3/15/2018 3/16/2018 3/17/2018 3/18/2018 3/19/2018 3/20/2018    Warfarin 4 mg - - - 8 mg - 8 mg 4 mg -    Warfarin 5 mg - - - - 10 mg - - -    Warfarin 6 mg - 6 mg 6 mg - - - - -    INR 0.86 - 1.14 1.03 1.37(H) 1.36(H) 1.31(H) 1.76(H) 2.48(H) 2.82(H)        Bleeding Signs/Symptoms:  None    Assessment:  Current INR is therapeutic, however patient is to have thoracentesis on 3/21/18. INR needs to be <2 for procedure.    Plan:  1) HOLD today s warfarin dose.   An order has been placed in EPIC for  Warfarin- No Dose Today    2) Do not recommend reversal with vitamin K or FFP at this time.  3) Recheck next INR tomorrow with AM labs.    The primary team contacted pharmacy regarding above plan.    Antoinette Lopez, Pharm.D., [facility]  Pager [phone]

## 2018-03-20 NOTE — PROGRESS NOTES
Transplant Social Work Services Progress Note      Date of Initial Social Work Evaluation: 2/20/2018  Collaborated with: rehab admission, pulm team.  [facility]. 6B RN    Data: [facility] re[facility] admissions received insurance auth this a.m.  Unfortunately, due to too many other previously scheduled admissions.  They can not admit till tomorrow.    Intervention: updated pulm. Team, floor staff and patient and .  Changed [employer] w/c ride to 1300 tomorrow.    Assessment: medically stable .  Deconditioned.    Education provided by : transfer process.    Plan:    Discharge Plans in Progress: to [facility] when bed available.      Barriers to d/c plan: bed availability    Follow up Plan: to [facility] tomorrow

## 2018-03-20 NOTE — PLAN OF CARE
Problem: Patient Care Overview  Goal: Plan of Care/Patient Progress Review  Discharge Planner PT   Patient plan for discharge: pt plans to discharge to ARU tomorrow  Current status: Pt ambulates 100 , 120 , 120  with FWW and CGA, wc follow, extended seated rest breaks between bouts. Pt transfers supine>sit SBA. Pt transfers sit<>stand from EOB x2 reps CGA . Pt transfers sit<>stand from wc (+ 1 pillow underneath) x 3 reps CGA, pt more successful today from this lower surface than previous attempts. Pt completes mini squats x5 reps to facilitate improved sit to stands. Pt requires extended rest breaks throughout session, however activity tolerance has greatly improved in the last week.    Barriers to return to prior living situation: respiratory status, impaired activity tolerance, LE and proximal strength, and functional mobility  Recommendations for discharge: ARU  Rationale for recommendations: pt would benefit from continued skilled rehab in order to continue to improve her LE and proximal strength, activity tolerance, and functional mobility.        Entered by: Vibha Bang 03/20/2018 2:38 PM

## 2018-03-20 NOTE — PLAN OF CARE
Problem: Patient Care Overview  Goal: Plan of Care/Patient Progress Review  Discharge Planner OT   Patient plan for discharge: ARU   Current status: Pt CGA for sit > stand from EOB and commode and min A for sit > stand from nu-step and wheelchair. Pt CGA for toileting cares. Pt ambulated hallway 50ft + 75ft + 50ft with CGA and FWW, pt needing frequent rest breaks due to anxiety regarding face mask. Pt tolerated 8 min on nu-step with x4 rest breaks. Pt on RA, SpO2 93-96% HR 110s with activity. Pt endorsed increased work of breathing today.   Barriers to return to prior living situation: weakness, deconditioning, balance deficits   Recommendations for discharge: ARU   Rationale for recommendations: Pt very motivated in therapies, making steady gains, and presents well below baseline in ADLs. Pt would benefit from increased intensity of therapies to return to PLOF.        Entered by: Samanta Ortiz 03/20/2018 11:55 AM

## 2018-03-20 NOTE — PROGRESS NOTES
Calorie Count  Intake recorded for: 3/19 Kcals: 1444  Protein: 37g  # Meals Recorded: 2 meals (First - 100% orange juice, sausage kailash, 50% 2 pancake with butter & syrup)      (Second - 100% baked potato chips, 50% deli sandwich w/ summers, swiss, lettuce & tomato)  # Supplements Recorded: 100% 1 Boost Shake

## 2018-03-20 NOTE — PLAN OF CARE
Problem: Patient Care Overview  Goal: Plan of Care/Patient Progress Review  Neuro: Patient alert and oriented x's 4. Neuro's intact.   Cardiac: Patient in 's. BP's stable. Afebrile.    Respiratory: On RA. Slight shortness of breath with activity.   GI/: Adequate urine output. Multiple loose BM's.   Diet/appetite: Tolerating regular diet. Continuing calorie counts.   Activity:  Stand by assist. Worked with PT and OT today.   Pain: Denying pain throughout the day.   Skin: Intact, no new deficits noted. Mepilex on coccyx.     Plan: Mag of 1.9 replaced. Went to support group with  this afternoon. Stitch still in groin site, remove tomorrow? Discharging to ARU tomorrow depending on bed availability. Will continue to monitor.

## 2018-03-21 ENCOUNTER — APPOINTMENT (OUTPATIENT)
Dept: OCCUPATIONAL THERAPY | Facility: CLINIC | Age: 58
DRG: 003 | End: 2018-03-21
Attending: INTERNAL MEDICINE
Payer: COMMERCIAL

## 2018-03-21 LAB
ALBUMIN SERPL-MCNC: 2.5 G/DL (ref 3.4–5)
ALT SERPL W P-5'-P-CCNC: 26 U/L (ref 0–50)
ANION GAP SERPL CALCULATED.3IONS-SCNC: 10 MMOL/L (ref 3–14)
BUN SERPL-MCNC: 36 MG/DL (ref 7–30)
CALCIUM SERPL-MCNC: 8.9 MG/DL (ref 8.5–10.1)
CHLORIDE SERPL-SCNC: 107 MMOL/L (ref 94–109)
CO2 SERPL-SCNC: 22 MMOL/L (ref 20–32)
CREAT SERPL-MCNC: 1.18 MG/DL (ref 0.52–1.04)
ERYTHROCYTE [DISTWIDTH] IN BLOOD BY AUTOMATED COUNT: 21.3 % (ref 10–15)
GFR SERPL CREATININE-BSD FRML MDRD: 48 ML/MIN/1.7M2
GLUCOSE SERPL-MCNC: 108 MG/DL (ref 70–99)
HCT VFR BLD AUTO: 29.3 % (ref 35–47)
HGB BLD-MCNC: 9 G/DL (ref 11.7–15.7)
INR PPP: 1.85 (ref 0.86–1.14)
INR PPP: 2.67 (ref 0.86–1.14)
LDH SERPL L TO P-CCNC: 361 U/L (ref 81–234)
MAGNESIUM SERPL-MCNC: 1.6 MG/DL (ref 1.6–2.3)
MCH RBC QN AUTO: 31.6 PG (ref 26.5–33)
MCHC RBC AUTO-ENTMCNC: 30.7 G/DL (ref 31.5–36.5)
MCV RBC AUTO: 103 FL (ref 78–100)
PHOSPHATE SERPL-MCNC: 3.3 MG/DL (ref 2.5–4.5)
PLATELET # BLD AUTO: 298 10E9/L (ref 150–450)
POTASSIUM SERPL-SCNC: 4.9 MMOL/L (ref 3.4–5.3)
PROT SERPL-MCNC: 5.6 G/DL (ref 6.8–8.8)
RBC # BLD AUTO: 2.85 10E12/L (ref 3.8–5.2)
SODIUM SERPL-SCNC: 139 MMOL/L (ref 133–144)
WBC # BLD AUTO: 4.8 10E9/L (ref 4–11)

## 2018-03-21 PROCEDURE — 85027 COMPLETE CBC AUTOMATED: CPT | Performed by: INTERNAL MEDICINE

## 2018-03-21 PROCEDURE — 25000128 H RX IP 250 OP 636: Performed by: SURGERY

## 2018-03-21 PROCEDURE — 12000006 ZZH R&B IMCU INTERMEDIATE UMMC

## 2018-03-21 PROCEDURE — 84155 ASSAY OF PROTEIN SERUM: CPT | Performed by: NURSE PRACTITIONER

## 2018-03-21 PROCEDURE — 85610 PROTHROMBIN TIME: CPT | Performed by: SURGERY

## 2018-03-21 PROCEDURE — 25000131 ZZH RX MED GY IP 250 OP 636 PS 637: Performed by: SURGERY

## 2018-03-21 PROCEDURE — 83735 ASSAY OF MAGNESIUM: CPT | Performed by: STUDENT IN AN ORGANIZED HEALTH CARE EDUCATION/TRAINING PROGRAM

## 2018-03-21 PROCEDURE — 83735 ASSAY OF MAGNESIUM: CPT | Performed by: INTERNAL MEDICINE

## 2018-03-21 PROCEDURE — 40000193 ZZH STATISTIC PT WARD VISIT

## 2018-03-21 PROCEDURE — 25000125 ZZHC RX 250: Performed by: NURSE PRACTITIONER

## 2018-03-21 PROCEDURE — 25000132 ZZH RX MED GY IP 250 OP 250 PS 637: Performed by: THORACIC SURGERY (CARDIOTHORACIC VASCULAR SURGERY)

## 2018-03-21 PROCEDURE — 25000132 ZZH RX MED GY IP 250 OP 250 PS 637: Performed by: NURSE PRACTITIONER

## 2018-03-21 PROCEDURE — 84460 ALANINE AMINO (ALT) (SGPT): CPT | Performed by: INTERNAL MEDICINE

## 2018-03-21 PROCEDURE — 36415 COLL VENOUS BLD VENIPUNCTURE: CPT | Performed by: SURGERY

## 2018-03-21 PROCEDURE — 83615 LACTATE (LD) (LDH) ENZYME: CPT | Performed by: NURSE PRACTITIONER

## 2018-03-21 PROCEDURE — 83615 LACTATE (LD) (LDH) ENZYME: CPT | Performed by: INTERNAL MEDICINE

## 2018-03-21 PROCEDURE — 25000132 ZZH RX MED GY IP 250 OP 250 PS 637: Performed by: INTERNAL MEDICINE

## 2018-03-21 PROCEDURE — 25000132 ZZH RX MED GY IP 250 OP 250 PS 637: Performed by: ANESTHESIOLOGY

## 2018-03-21 PROCEDURE — 97110 THERAPEUTIC EXERCISES: CPT | Mod: GO

## 2018-03-21 PROCEDURE — 25000125 ZZHC RX 250: Performed by: SURGERY

## 2018-03-21 PROCEDURE — 97535 SELF CARE MNGMENT TRAINING: CPT | Mod: GO

## 2018-03-21 PROCEDURE — 25000132 ZZH RX MED GY IP 250 OP 250 PS 637: Performed by: SURGERY

## 2018-03-21 PROCEDURE — 84155 ASSAY OF PROTEIN SERUM: CPT | Performed by: INTERNAL MEDICINE

## 2018-03-21 PROCEDURE — 97530 THERAPEUTIC ACTIVITIES: CPT | Mod: GP

## 2018-03-21 PROCEDURE — 25000128 H RX IP 250 OP 636: Performed by: STUDENT IN AN ORGANIZED HEALTH CARE EDUCATION/TRAINING PROGRAM

## 2018-03-21 PROCEDURE — 25000131 ZZH RX MED GY IP 250 OP 636 PS 637: Performed by: THORACIC SURGERY (CARDIOTHORACIC VASCULAR SURGERY)

## 2018-03-21 PROCEDURE — 36415 COLL VENOUS BLD VENIPUNCTURE: CPT | Performed by: STUDENT IN AN ORGANIZED HEALTH CARE EDUCATION/TRAINING PROGRAM

## 2018-03-21 PROCEDURE — 25000131 ZZH RX MED GY IP 250 OP 636 PS 637: Performed by: NURSE PRACTITIONER

## 2018-03-21 PROCEDURE — 85610 PROTHROMBIN TIME: CPT | Performed by: INTERNAL MEDICINE

## 2018-03-21 PROCEDURE — 40000133 ZZH STATISTIC OT WARD VISIT

## 2018-03-21 PROCEDURE — 80069 RENAL FUNCTION PANEL: CPT | Performed by: INTERNAL MEDICINE

## 2018-03-21 PROCEDURE — 36415 COLL VENOUS BLD VENIPUNCTURE: CPT | Performed by: INTERNAL MEDICINE

## 2018-03-21 RX ORDER — PREDNISONE 10 MG/1
10 TABLET ORAL EVERY EVENING
Status: DISCONTINUED | OUTPATIENT
Start: 2018-03-22 | End: 2018-03-24 | Stop reason: HOSPADM

## 2018-03-21 RX ADMIN — MULTIPLE VITAMINS W/ MINERALS TAB 1 TABLET: TAB at 08:05

## 2018-03-21 RX ADMIN — LEVALBUTEROL TARTRATE 2 PUFF: 45 AEROSOL, METERED ORAL at 16:14

## 2018-03-21 RX ADMIN — MAGNESIUM SULFATE IN WATER 4 G: 40 INJECTION, SOLUTION INTRAVENOUS at 17:10

## 2018-03-21 RX ADMIN — METOPROLOL TARTRATE 25 MG: 25 TABLET, FILM COATED ORAL at 23:27

## 2018-03-21 RX ADMIN — Medication 1 TABLET: at 08:06

## 2018-03-21 RX ADMIN — METOPROLOL TARTRATE 25 MG: 25 TABLET, FILM COATED ORAL at 00:29

## 2018-03-21 RX ADMIN — MYCOPHENOLATE MOFETIL 1500 MG: 250 CAPSULE ORAL at 18:25

## 2018-03-21 RX ADMIN — PHYTONADIONE 5 MG: 10 INJECTION, EMULSION INTRAMUSCULAR; INTRAVENOUS; SUBCUTANEOUS at 09:39

## 2018-03-21 RX ADMIN — QUETIAPINE 50 MG: 50 TABLET ORAL at 23:27

## 2018-03-21 RX ADMIN — METOPROLOL TARTRATE 25 MG: 25 TABLET, FILM COATED ORAL at 12:45

## 2018-03-21 RX ADMIN — Medication 1 TABLET: at 18:26

## 2018-03-21 RX ADMIN — BUMETANIDE 1 MG: 1 TABLET ORAL at 08:05

## 2018-03-21 RX ADMIN — NYSTATIN 1000000 UNITS: 500000 SUSPENSION ORAL at 12:45

## 2018-03-21 RX ADMIN — PANTOPRAZOLE SODIUM 40 MG: 40 TABLET, DELAYED RELEASE ORAL at 08:05

## 2018-03-21 RX ADMIN — VALGANCICLOVIR 900 MG: 450 TABLET, FILM COATED ORAL at 08:06

## 2018-03-21 RX ADMIN — NYSTATIN 1000000 UNITS: 500000 SUSPENSION ORAL at 16:14

## 2018-03-21 RX ADMIN — SULFAMETHOXAZOLE AND TRIMETHOPRIM 1 TABLET: 400; 80 TABLET ORAL at 08:06

## 2018-03-21 RX ADMIN — LEVALBUTEROL TARTRATE 2 PUFF: 45 AEROSOL, METERED ORAL at 08:16

## 2018-03-21 RX ADMIN — PREDNISONE 12.5 MG: 2.5 TABLET ORAL at 08:05

## 2018-03-21 RX ADMIN — TACROLIMUS 5.5 MG: 5 CAPSULE ORAL at 18:25

## 2018-03-21 RX ADMIN — LEVALBUTEROL TARTRATE 2 PUFF: 45 AEROSOL, METERED ORAL at 20:04

## 2018-03-21 RX ADMIN — NYSTATIN 1000000 UNITS: 500000 SUSPENSION ORAL at 08:07

## 2018-03-21 RX ADMIN — LEVALBUTEROL TARTRATE 2 PUFF: 45 AEROSOL, METERED ORAL at 12:46

## 2018-03-21 RX ADMIN — PREDNISONE 12.5 MG: 2.5 TABLET ORAL at 18:26

## 2018-03-21 RX ADMIN — TACROLIMUS 5 MG: 5 CAPSULE ORAL at 08:07

## 2018-03-21 RX ADMIN — MAGNESIUM OXIDE TAB 400 MG (241.3 MG ELEMENTAL MG) 400 MG: 400 (241.3 MG) TAB at 12:45

## 2018-03-21 RX ADMIN — Medication 10 MG: at 08:05

## 2018-03-21 RX ADMIN — NYSTATIN 1000000 UNITS: 500000 SUSPENSION ORAL at 20:03

## 2018-03-21 RX ADMIN — MYCOPHENOLATE MOFETIL 1500 MG: 250 CAPSULE ORAL at 08:05

## 2018-03-21 ASSESSMENT — ACTIVITIES OF DAILY LIVING (ADL)
ADLS_ACUITY_SCORE: 11

## 2018-03-21 NOTE — PHARMACY-ANTICOAGULATION SERVICE
Warfarin Therapy Hold Note  This patient is currently receiving warfarin for LUE provoked DVT.    Goal INR:  2-3.      Anticoagulation Dose History     Recent Dosing and Labs Latest Ref Rng & Units 3/15/2018 3/16/2018 3/17/2018 3/18/2018 3/19/2018 3/20/2018 3/21/2018    Warfarin 4 mg - - 8 mg - 8 mg 4 mg - -    Warfarin 5 mg - - - 10 mg - - - -    Warfarin 6 mg - 6 mg - - - - - -    INR 0.86 - 1.14 1.37(H) 1.36(H) 1.31(H) 1.76(H) 2.48(H) 2.82(H) 2.67(H)            Bleeding Signs/Symptoms:  None    Assessment:  Current INR is therapeutic, however patient is scheduled for thoracentesis today and INR needs to be </=2 for the procedure  Plan:  1) HOLD today s warfarin dose.   An order has been placed in EPIC for  Warfarin- No Dose Today    2) Recommend reversal with vitamin K IV 5mg x1 stat  Recheck next INR this afternoon  The primary team is aware of the above plan and asked for pharmacy assistance in dosing vitamin K.    Daron Hogan, PharmD, BCPS  Pager: [phone]

## 2018-03-21 NOTE — PLAN OF CARE
Problem: Patient Care Overview  Goal: Plan of Care/Patient Progress Review  Outcome: Improving  Neuro: A&Ox4.   Cardiac: NST. HR Low 100s. VSS.   Respiratory: Sating 99% on RA. Frequent cough, creamy sputum, no blood. Using scheduled inhaler. Using IS independently. Lung sounds diminished.  GI/: Adequate urine output and BMx1.   Diet/appetite: Tolerating regular diet. Eating well.  Activity:  Stand by assist, up to bathroom.   Pain: Denies pain.  Skin: Surgical incisions dry/intact no complications. Right groin sutures embedded in skin, primary team will assess in the morning.   LDA's: x2RPIV SL.    Plan: Continue with POC. Thoracentesis to be done tomorrow. Notify primary team with changes.    Problem: Chronic Respiratory Difficulty Comorbidity  Goal: Chronic Respiratory Difficulty  Patient comorbidity will be monitored for signs and symptoms of Respiratory Difficulty (Chronic) condition.  Problems will be absent, minimized or managed by discharge/transition of care.   Outcome: Improving  Satting 100% on RA. Frequent productive cough. Creamy thin sputum. Using IS independently.

## 2018-03-21 NOTE — PLAN OF CARE
Problem: Patient Care Overview  Goal: Plan of Care/Patient Progress Review  Discharge Planner PT   Patient plan for discharge: pt plans to discharge to ARU as medically ready  Current status: Assist pt in sit<>stand CGA from toilet, CGA to ambulate without an AD to bed (~10'). While in bathroom, pt reports not feeling well, feeling very sweaty, nauseous, and has a heavy pressure in her R lower lung region, alerted RN. Pt transfers stand>sit CGA and supine<>sit with Wally at B LEs. Assist pt in R trunk rotation stretch by assisting pt in knee flexion and hip rotation to L. Provide this stretch for patient with prolonged holds for 1-2 minutes for 5-6 minutes, and pt reports this greatly reduces her symptoms. Encourage pt to partipate in activity once pain subsides. Pt transfers sit<>stand x 4 reps CGA.  Barriers to return to prior living situation: medical status, anxiety related to mobility, impaired LE and proximal strength, functional mobility, and activity tolerance.   Recommendations for discharge: ARU   Rationale for recommendations: pt would benefit from continued intense skilled rehab in order to improve the above listed impairments and to return to her PLOF.        Entered by: Vibha Bang 03/21/2018 2:37 PM

## 2018-03-21 NOTE — PROGRESS NOTES
"Transplant Social Work Services Progress Note      Date of Initial Social Work Evaluation: 2/20/2018  Collaborated with: Patient and     Data: Met with Kecia and her  to provide a supportive visit. Patient was supposed to be discharged, but per needing fluid drained around lung, discharge plan has change.  Intervention: Patient plans to have fluid around lung drained today, then can hopefully be discharged to [facility] tomorrow.  Assessment: Kecia is coping well. 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  Education provided by SW: Information about discharge process and ways to continue to cope with feelings posttransplant. Patient requested information about writing to donor family.  Plan:    Discharge Plans in Progress: To [facility] when medically stable.     Barriers to d/c plan: Needs procedure to drain fluid    Follow up Plan: Will follow patient throughout hospitalization to provide supportive counseling and bring donor family information packet to them.     "

## 2018-03-21 NOTE — PROGRESS NOTES
CLINICAL NUTRITION SERVICES - REASSESSMENT NOTE     Nutrition Prescription    RECOMMENDATIONS FOR MDs/PROVIDERS TO ORDER:  Encourage PO intakes (high protein diet)    Malnutrition Status:    Severe malnutrition in the context of chronic illness    Recommendations already ordered by Registered Dietitian (RD):  None at this time    Future/Additional Recommendations:  If PO intakes begin to decline, recommend scheduled snacks and perhaps room sevice with assist     EVALUATION OF THE PROGRESS TOWARD GOALS   Diet: regular- Boost shakes b/t meals  Nutrition Support: 3/16-3/18: Nutren 1.5 @ 50ml/hr x 14 hours to provide, 700ml, 1050 kcals (72%), 48 g pro (75%), 123 g CHO.  NJ removed by team (RD not notified) on 3/18.  Endo signed off     Intake:  Calorie counts:  3/17: 1246 kcals, 42 g pro  3/18: 1876 kcals, 73 g pro  3/19: 1444 kcals, 37 g pro  Average kcal intakes: 1522 kcals (30<), 51 (1)       NEW FINDINGS   Weight: Pt fluid up since admit, suspect fluids + 12L  Labs: Cr: 1.18 (H), BUN: 36 (H)  GI: BM+ noted  Skin: per WOC: Assessed bilateral buttock, coccyx and sacrum. Mild blanchable erythema at coccyx, no concerns.     MALNUTRITION  % Intake: No decreased intake noted  % Weight Loss: None noted  Subcutaneous Fat Loss: Facial region: moderate, Upper arm: moderate, Lower arm:  Mild-moderate and Thoracic/intercostal:  Mild- moderate  Muscle Loss: Temporal: moderate, Facial & jaw region:  moderate, Thoracic region (clavicle, acromium bone, deltoid, trapezius, pectoral):  moderate, Upper arm (bicep, tricep):  moderate and Upper leg (quadricep, hamstring):  moderate  Fluid Accumulation/Edema: Does not meet criteria  Malnutrition Diagnosis: Severe malnutrition in the context of chronic illness    Previous Goals   Total avg nutritional intake to meet a minimum of 30 kcal/kg and 1.3 g PRO/kg daily (per dosing wt 50 kg). TF cycle + PO  Evaluation: Met    Previous Nutrition Diagnosis  Inadequate oral intake related to  decreased appetite and ongoing satiety from TF as evidenced by pt need for tube feeds at this time as one day yury counts only providing ~500kcals.   Evaluation: Improving    CURRENT NUTRITION DIAGNOSIS  Predicted inadequate nutrient intake prolonged hospital stay, menu fatigue, and increased needs with new lung transplant.      INTERVENTIONS  Implementation  Collaboration with other providers- 6B rounds    Goals  Patient to consume % of nutritionally adequate meal trays TID, or the equivalent with supplements/snacks.    Monitoring/Evaluation  Progress toward goals will be monitored and evaluated per protocol.      Natalia Gutierrez, AMALIA, MS, LD  6B- Pager: [phone]

## 2018-03-21 NOTE — PLAN OF CARE
Problem: Patient Care Overview  Goal: Plan of Care/Patient Progress Review  Discharge Planner OT   Patient plan for discharge: ARU  Current status: Pt required CGA and vc's for transfer supine<->seated EOB with lung pillow. Pt completed transfer sit<->standing with CGA/Min A and vc's. Pt required Mod A to don edema stockings. Pt able to independently don shoes and tie them with SBA within precautions. Pt taken to gym via WC. Pt completed 10 minutes of intermittent Nustep exercise with rest breaks throughout. Pt tolerated therapy session well. Therapist educated pt on pacing physical activity to stay ahead of fatigue and pt verbalized understanding. VSS.  Barriers to return to prior living situation: Decreased activity tolerance and strength, sternal precautions.   Recommendations for discharge: ARU vs. Possible progression to discharge to local housing with A of  and OP Phase II SD pending length of stay.   Rationale for recommendations: Pt will benefit from continued therapy to address barriers above and to maximize functional independence.        Entered by: Manas Boo 03/21/2018 10:50 AM

## 2018-03-21 NOTE — PROGRESS NOTES
Pulmonary Medicine  Cystic Fibrosis - Lung Transplant Daily Progress Note   March 21, 2018       Patient: Kecia Blue  MRN: [id number]  Transplant Date: 3/1/2018 (POD# 20)  Admission date: 2/21/2018  Hospital Day #28          Assessment and Plan:     Kecia Blue is a 56 y/o female w/PMHx significant for dermatomyositis (on immunosuppression), seronegative RA, ILD, and pulmonary hypertension who was admitted for emergent lung transplant w/u on 2/21 for increased SOB and hypoxia. Required V-A ECMO for right heart failure on 2/27. Now s/p BSLT on 3/1 with ECMO decannulation on 3/3. Post-op course c/b hemodynamic instability requiring pressors and iFlolan, transitioned to Revatio now d/c'd. Extubated POD #5 and weaned to RA with continued improvement in conditioning. Persistent bilateral pleural effusions now requiring thoracentesis on the right prior to discharge to ARU.          S/p bilateral sequential lung transplant (3/1/18) for acute hypoxic/hypercapneic respiratory failure 2/2 ILD:  Post-op Pulm HTN 2/2 ILD:   Bilateral pleural effusion  Adequate graft function. Weaned off iFlolan but continues on PO Revatio. Extubated 3/6 and has remained stable on RA with only mild DESHPANDE. Last chest tube removed 3/13. Surveillance bronchoscopy 3/13 with bilateral anastomoses intact and patent, thick yellow secretions around right anastomosis, suctioned and removed. Pleural effusion noted on imaging, possible hemothorax given drifting hgb (see below).   - CXR 3/20 with persistent bilateral effusions R>L with right increased in size, continued opacities to left mid lung, and slightly increased pulmonary edema -- personally reviewed by me  - IR for right-sided therapeutic / diagnostic thoracentesis today. Labs and cultures ordered  - Echo 3/20, personally reviewed by me, displaying normal RV and LV function. Discontinue Revatio.   - Diuretics as noted below  - Continue aggressive pulmonary toilet with  acapella/incentive spirometry q 1hr while awake.                      Continue 3-drug immunosuppression:  - Tacrolimus 5 mg qAM / 5.5 mg qPM. Target goal 10-15. Next level 3/22.  - MMF 1500 g BID   - Prednisone 12.5 mg BID. Steroid taper below:  Date AM (mg) PM (mg)   3/15/18 12.5 12.5   3/22/18 12.5 10   4/26/18 10 10   5/24/18 10 7.5   6/22/18 7.5 7.5   7/20/18 7.5 5   8/24/18 5 5   9/21/18 5 2.5      Prophylaxis:   PJP: Bactrim  CMV: vangancyclovir  Fungal: Nystatin     Donor Recipient Intervention   CMV status  Pos   Pos  valgancyclovir POD #8-90   EBV status Pos Pos Immune    HSV status N/A  Pos Immune       Infectious disease:  SIRS, Resolved:  No known infectious disease history. Post-operative SIRS likely a response from multiple procedures (VA ECMO cannulation and decannulation) and transplant surgery. Remains afebrile and without leukocytosis. Negative recipient bronch culture from time of transplant.  Donor culture at OSH grew MRSA, Strep pneumoniae, Moraxella catarrhalis, and Haemophilus species. Donor culture at [facility] grew MRSA. S/p 2-week course of vancomycin and pip/tazo.       RADHA, Resolved:  Volume overload, Resolved:  Suspect secondary to meds and diuresis. Serum Cr+ and BLE edema now back to WNL. LE edema likely 2/2 low albumin state. Improved with diuretics. Received additional dose of Bumex 3/20.   - Bumex 1 mg PO daily   - Trend BMP daily  - Avoid nephrotoxic agents as possible    LUE DVT, Provoked:  Noted on UE ultrasound (3/7). Received heparin gtt followed by enoxaparin SQ as bridge to therapeutic INR.   - Warfarin, pharm to dose, held last evening with plans for IR today, procedural goal INR <2.  Will ask Pharm to dose Vit K to reverse warfarin effects. Repeat INR early afternoon.   - INR goal 2-3. Will need 3-months of anticoagulation      Acute anemia:  Hgb with slow drift to 7's. No evidence of bleeding. Likely 2/2 multiple lab draws, bone marrow suppressive medications. Received  "transfusion 3/8 and 3/15 with appropriate response. Stable hgb upper 8s.  - Trend hgb daily    Hypomagnesemia:   Likely r/t CNI.  - Mg oxide 400 mg daily     Steroid-induced hyperglycemia, Resolved:  Glucose relatively well controlled with very minimal insulin needs. BG/insulin stopped 3/19 with stable BG and difficulty getting POCT testing d/t Raynaud's. Endocrine consulted, signed off 3/19..  - Monitor glucose via BMP checks      Dysphagia, Resolved:  Noted post-transplant. SLP consulted while inpatient. Transitioned to regular diet on 3/15.    Diarrhea, Resolved:  Large bowel loops on imaging:  Painful defecation, Resolved:  Likely 2/2 antibiotics, immunosuppression agents and/or TFs. Pain while defecation could be anal fissure versus hemorrhoid, now resolved. C diff negative. Abd XR on 3/15 with decrease gaseous distention and stool burden. Overall improved but gas bubble persists on XR.  - Desitin cream daily BID prn  - Imodium prn       FEN: Regular diet, electrolyte protocol  PPX: Warfarin, PPI   CODE: Full  DISPO: Inpatient cares. ARU versus home with OP PT this week after thoracentesis.     Patient discussed with Dr. Riley.    Vibha Albert, APRN, CNP  Inpatient Nurse Practitioner  [facility]  Pager [phone]        Subjective & Interval History:     Last 24 hour vital signs, labs and care team notes reviewed.    No acute events overnight. Remains afebrile, hemodynamically stable on RA. Endorsing \"wet\" non-productive cough over the past 24-hrs. Denies SOB and/or DESHPANDE. Continues to have increased exercise tolerance. Very motivated to work with PT/OT. Planning for thoracentesis in IR later today.            Review of Systems:     C: no fever, no chills, no change in weight, improving appetite  INTEGUMENTARY/SKIN: no rash or obvious new lesions  ENT/MOUTH: no sore throat, no sinus pain, no nasal drainage  RESP: see interval history  CV: no chest pain, no palpitations, no peripheral edema, no orthopnea  GI: no " nausea, no vomiting, no change in stools, no reflux symptoms  : no dysuria  MUSCULOSKELETAL: no myalgias, no arthralgias  ENDOCRINE: blood sugars with adequate control  NEURO: no headache, no numbness or tingling  PSYCHIATRIC: mood stable          Medications:     Active Medications:    warfarin-No DOSE today  1 each Does not apply no dose today (warfarin)     multivitamin, therapeutic with minerals  1 tablet Oral Daily     predniSONE  12.5 mg Oral BID w/meals     calcium-vitamin D  1 tablet Oral BID w/meals     magnesium oxide  400 mg Oral Daily     tacrolimus  5 mg Oral QAM     tacrolimus  5.5 mg Oral QPM     pantoprazole  40 mg Oral QAM     valGANciclovir  900 mg Oral Daily     bumetanide  1 mg Oral Daily     sulfamethoxazole-trimethoprim  1 tablet Oral or NG Tube Daily     metoprolol tartrate  25 mg Oral BID     QUEtiapine  50 mg Oral At Bedtime     levalbuterol  2 puff Inhalation Q4H JULES     nystatin  1,000,000 Units Swish & Swallow 4x Daily     mycophenolate  1,500 mg Oral BID IS     Active PRN Medications:  polyethylene glycol, Warfarin Therapy Reminder, potassium phosphate (KPHOS) in D5W IV, potassium phosphate (KPHOS) in D5W IV, potassium phosphate (KPHOS) in D5W IV, potassium phosphate (KPHOS) in D5W IV, potassium phosphate (KPHOS) in D5W IV, naloxone, acetaminophen, ondansetron **OR** ondansetron, glucose **OR** dextrose **OR** glucagon, potassium chloride, potassium chloride, potassium chloride, potassium chloride with lidocaine, potassium chloride, magnesium sulfate, magnesium sulfate         Physical Exam:     Constitutional: Awake, alert, in no apparent distress.   HEENT: Eyes with pink conjunctivae, anicteric. Oral mucosa moist without lesions. Neck supple without lymphadenopathy.   PULM: Good air flow bilaterally. Crackles RLL, otherwise clear lung sounds. Non-labored breathing on RA.  CV: Normal S1 and S2. RRR.  No murmur, gallop, or rub.  No peripheral edema.   ABD: NABS, soft, nontender,  "nondistended.  No guarding.   MSK: Moves all extremities.  No apparent muscle wasting.   NEURO: Alert and oriented x 4, conversant.   SKIN: Warm, dry.  No rash on limited exam.   PSYCH: Mood stable.?     Lines, Drains, and Devices:  Peripheral IV 03/10/18 Right Upper forearm (Active)   Site Assessment [facility] 3/20/2018 12:15 PM   Line Status Saline locked 3/20/2018 12:15 PM   Phlebitis Scale 0-->no symptoms 3/20/2018 12:15 PM   Infiltration Scale 0 3/20/2018 12:15 PM   Infiltration Site Treatment Method  None 3/16/2018 11:30 AM   Extravasation? No 3/20/2018 12:15 PM   Number of days:10       Peripheral IV 03/15/18 Right;Posterior Lower forearm (Active)   Site Assessment [facility] 3/20/2018 12:15 PM   Line Status Saline locked 3/20/2018 12:15 PM   Phlebitis Scale 0-->no symptoms 3/20/2018 12:15 PM   Infiltration Scale 0 3/20/2018 12:15 PM   Infiltration Site Treatment Method  None 3/16/2018 11:30 AM   Extravasation? No 3/20/2018 12:15 PM   Number of days:5          Data:     All vital signs, laboratory and imaging data for the past 24 hours reviewed.      Vital signs:  Temp: 98.3  F (36.8  C) Temp src: Oral BP: 117/86   Heart Rate: 99 Resp: 20 SpO2: 99 % O2 Device: None (Room air) Oxygen Delivery: 3 LPM Height: 160 cm (5' 3\") Weight: 56.5 kg (124 lb 8 oz)    Pain: # Pain Assessment:   Current Pain Score 3/21/2018 3/21/2018 3/21/2018   Patient currently in pain? denies denies denies   Pain location - - -   Pain descriptors - - -   CPOT pain score - - -   Kecia rivas pain level was assessed and she currently denies pain.      Weight trend:   Vitals:    03/19/18 0500 03/20/18 0330 03/21/18 0451   Weight: 58.8 kg (129 lb 9.6 oz) 57.4 kg (126 lb 8.7 oz) 56.5 kg (124 lb 8 oz)        I/O:   Intake/Output Summary (Last 24 hours) at 03/20/18 1503  Last data filed at 03/20/18 1000   Gross per 24 hour   Intake              480 ml   Output              800 ml   Net             -320 ml       Labs    CMP:     Recent Labs  Lab 03/21/18  0529 " 03/20/18  0456 03/19/18  0450 03/18/18  0534    139 139 140   POTASSIUM 4.9 5.0 5.2 5.3   CHLORIDE 107 106 107 106   CO2 22 23 23 26   ANIONGAP 10 10 10 8   * 121* 115* 137*   BUN 36* 34* 36* 30   CR 1.18* 0.94 0.93 0.90   GFRESTIMATED 48* 62 62 65   GFRESTBLACK 57* 75 75 78   CHELSEY 8.9 8.7 8.8 8.6   MAG 1.6 1.7 1.9 1.7   PHOS 3.3 3.7 3.8 3.0   PROTTOTAL 5.6*  --   --   --    ALBUMIN 2.5* 2.5* 2.5* 2.4*   ALT 26 25 21 18       CBC:     Recent Labs  Lab 03/21/18  0529 03/20/18  0456 03/19/18  0450 03/18/18  0534   WBC 4.8 5.4 7.2 7.9   RBC 2.85* 3.07* 2.88* 3.04*   HGB 9.0* 9.6* 8.9* 9.4*   HCT 29.3* 31.3* 29.5* 31.3*   * 102* 102* 103*   MCH 31.6 31.3 30.9 30.9   MCHC 30.7* 30.7* 30.2* 30.0*   RDW 21.3* 21.4* 21.5* 21.5*    250 231 197       INR:     Recent Labs  Lab 03/21/18  0529 03/20/18  0456 03/19/18  0450 03/18/18  0534   INR 2.67* 2.82* 2.48* 1.76*       Glucose:   Recent Labs  Lab 03/21/18  0529 03/20/18  0456 03/19/18  0450 03/18/18  2326 03/18/18  1750 03/18/18  1316 03/18/18  0906 03/18/18  0534 03/18/18  0042 03/17/18  2114  03/17/18  0604  03/16/18  0639   * 121* 115*  --   --   --   --  137*  --   --   --  125*  --  127*   BGM  --   --   --  163* 147* 132* 158*  --  139* 151*  < >  --   < >  --    < > = values in this interval not displayed.    Blood Gas: No lab results found in last 7 days.    Culture Data: No results for input(s): CULT in the last 168 hours.    Virology Data:   Lab Results   Component Value Date    FLUAH1 Negative 02/27/2018    FLUAH3 Negative 02/27/2018    LX5927 Negative 02/27/2018    IFLUB Negative 02/27/2018    RSVA Negative 02/27/2018    RSVB Negative 02/27/2018    PIV1 Negative 02/27/2018    PIV2 Negative 02/27/2018    PIV3 Negative 02/27/2018    HMPV Negative 02/27/2018    HRVS Negative 02/27/2018    ADVBE Negative 02/27/2018    ADVC Negative 02/27/2018       Historical CMV results (last 3 of prior testing):  Lab Results   Component Value Date     CMVQNT Canceled, Test credited (A) 03/01/2018    CMVQNT Canceled, Test credited (A) 03/01/2018     Lab Results   Component Value Date    CMVLOG Canceled, Test credited 03/01/2018    CMVLOG Canceled, Test credited 03/01/2018       Urine Studies    Recent Labs   Lab Test  03/04/18   1425   URINEPH  5.5   NITRITE  Negative   LEUKEST  Negative   WBCU  5

## 2018-03-22 ENCOUNTER — APPOINTMENT (OUTPATIENT)
Dept: GENERAL RADIOLOGY | Facility: CLINIC | Age: 58
DRG: 003 | End: 2018-03-22
Attending: SURGERY
Payer: COMMERCIAL

## 2018-03-22 ENCOUNTER — APPOINTMENT (OUTPATIENT)
Dept: INTERVENTIONAL RADIOLOGY/VASCULAR | Facility: CLINIC | Age: 58
DRG: 003 | End: 2018-03-22
Attending: PHYSICIAN ASSISTANT
Payer: COMMERCIAL

## 2018-03-22 LAB
ALBUMIN SERPL-MCNC: 2.3 G/DL (ref 3.4–5)
ALT SERPL W P-5'-P-CCNC: 24 U/L (ref 0–50)
ANION GAP SERPL CALCULATED.3IONS-SCNC: 11 MMOL/L (ref 3–14)
APPEARANCE FLD: NORMAL
BUN SERPL-MCNC: 32 MG/DL (ref 7–30)
CALCIUM SERPL-MCNC: 8.4 MG/DL (ref 8.5–10.1)
CHLORIDE SERPL-SCNC: 106 MMOL/L (ref 94–109)
CHOLEST FLD-MCNC: 66 MG/DL
CO2 SERPL-SCNC: 21 MMOL/L (ref 20–32)
COLOR FLD: NORMAL
CREAT SERPL-MCNC: 1.03 MG/DL (ref 0.52–1.04)
ERYTHROCYTE [DISTWIDTH] IN BLOOD BY AUTOMATED COUNT: 20.8 % (ref 10–15)
GFR SERPL CREATININE-BSD FRML MDRD: 56 ML/MIN/1.7M2
GLUCOSE SERPL-MCNC: 137 MG/DL (ref 70–99)
GRAM STN SPEC: NORMAL
GRAM STN SPEC: NORMAL
HCT VFR BLD AUTO: 30.5 % (ref 35–47)
HGB BLD-MCNC: 9.3 G/DL (ref 11.7–15.7)
INR PPP: 1.25 (ref 0.86–1.14)
LDH FLD L TO P-CCNC: 590 U/L
LYMPHOCYTES NFR FLD MANUAL: 6 %
MAGNESIUM SERPL-MCNC: 2 MG/DL (ref 1.6–2.3)
MAGNESIUM SERPL-MCNC: 2.4 MG/DL (ref 1.6–2.3)
MCH RBC QN AUTO: 31.1 PG (ref 26.5–33)
MCHC RBC AUTO-ENTMCNC: 30.5 G/DL (ref 31.5–36.5)
MCV RBC AUTO: 102 FL (ref 78–100)
MONOS+MACROS NFR FLD MANUAL: 7 %
NEUTS BAND NFR FLD MANUAL: 87 %
PHOSPHATE SERPL-MCNC: 3 MG/DL (ref 2.5–4.5)
PLATELET # BLD AUTO: 345 10E9/L (ref 150–450)
POTASSIUM SERPL-SCNC: 4.8 MMOL/L (ref 3.4–5.3)
PROT FLD-MCNC: 3.1 G/DL
RBC # BLD AUTO: 2.99 10E12/L (ref 3.8–5.2)
SODIUM SERPL-SCNC: 137 MMOL/L (ref 133–144)
SPECIMEN SOURCE FLD: NORMAL
SPECIMEN SOURCE: NORMAL
TACROLIMUS BLD-MCNC: 9.3 UG/L (ref 5–15)
TME LAST DOSE: NORMAL H
TRIGL FLD-MCNC: 25 MG/DL
WBC # BLD AUTO: 5.1 10E9/L (ref 4–11)
WBC # FLD AUTO: 2373 /UL

## 2018-03-22 PROCEDURE — 85027 COMPLETE CBC AUTOMATED: CPT | Performed by: INTERNAL MEDICINE

## 2018-03-22 PROCEDURE — 87206 SMEAR FLUORESCENT/ACID STAI: CPT | Performed by: NURSE PRACTITIONER

## 2018-03-22 PROCEDURE — 25000131 ZZH RX MED GY IP 250 OP 636 PS 637: Performed by: THORACIC SURGERY (CARDIOTHORACIC VASCULAR SURGERY)

## 2018-03-22 PROCEDURE — 27210903 ZZH KIT CR5

## 2018-03-22 PROCEDURE — 36415 COLL VENOUS BLD VENIPUNCTURE: CPT | Performed by: INTERNAL MEDICINE

## 2018-03-22 PROCEDURE — 71045 X-RAY EXAM CHEST 1 VIEW: CPT

## 2018-03-22 PROCEDURE — 83605 ASSAY OF LACTIC ACID: CPT | Performed by: STUDENT IN AN ORGANIZED HEALTH CARE EDUCATION/TRAINING PROGRAM

## 2018-03-22 PROCEDURE — 00000155 ZZHCL STATISTIC H-CELL BLOCK W/STAIN: Performed by: NURSE PRACTITIONER

## 2018-03-22 PROCEDURE — 89051 BODY FLUID CELL COUNT: CPT | Performed by: NURSE PRACTITIONER

## 2018-03-22 PROCEDURE — 88305 TISSUE EXAM BY PATHOLOGIST: CPT | Performed by: NURSE PRACTITIONER

## 2018-03-22 PROCEDURE — 84478 ASSAY OF TRIGLYCERIDES: CPT | Performed by: NURSE PRACTITIONER

## 2018-03-22 PROCEDURE — 27210732 ZZH ACCESSORY CR1

## 2018-03-22 PROCEDURE — 27210995 ZZH RX 272: Performed by: PHYSICIAN ASSISTANT

## 2018-03-22 PROCEDURE — 27211039 ZZH NEEDLE CR2

## 2018-03-22 PROCEDURE — 87116 MYCOBACTERIA CULTURE: CPT | Performed by: NURSE PRACTITIONER

## 2018-03-22 PROCEDURE — 25000132 ZZH RX MED GY IP 250 OP 250 PS 637: Performed by: SURGERY

## 2018-03-22 PROCEDURE — 84157 ASSAY OF PROTEIN OTHER: CPT | Performed by: NURSE PRACTITIONER

## 2018-03-22 PROCEDURE — 87205 SMEAR GRAM STAIN: CPT | Performed by: NURSE PRACTITIONER

## 2018-03-22 PROCEDURE — 36415 COLL VENOUS BLD VENIPUNCTURE: CPT | Performed by: STUDENT IN AN ORGANIZED HEALTH CARE EDUCATION/TRAINING PROGRAM

## 2018-03-22 PROCEDURE — 74018 RADEX ABDOMEN 1 VIEW: CPT

## 2018-03-22 PROCEDURE — 80197 ASSAY OF TACROLIMUS: CPT | Performed by: SURGERY

## 2018-03-22 PROCEDURE — 84460 ALANINE AMINO (ALT) (SGPT): CPT | Performed by: INTERNAL MEDICINE

## 2018-03-22 PROCEDURE — 00000102 ZZHCL STATISTIC CYTO WRIGHT STAIN TC: Performed by: NURSE PRACTITIONER

## 2018-03-22 PROCEDURE — 25000128 H RX IP 250 OP 636: Performed by: SURGERY

## 2018-03-22 PROCEDURE — 87070 CULTURE OTHR SPECIMN AEROBIC: CPT | Performed by: NURSE PRACTITIONER

## 2018-03-22 PROCEDURE — 88112 CYTOPATH CELL ENHANCE TECH: CPT | Performed by: NURSE PRACTITIONER

## 2018-03-22 PROCEDURE — 82465 ASSAY BLD/SERUM CHOLESTEROL: CPT | Performed by: NURSE PRACTITIONER

## 2018-03-22 PROCEDURE — 80069 RENAL FUNCTION PANEL: CPT | Performed by: INTERNAL MEDICINE

## 2018-03-22 PROCEDURE — 25000125 ZZHC RX 250: Performed by: SURGERY

## 2018-03-22 PROCEDURE — 32555 ASPIRATE PLEURA W/ IMAGING: CPT

## 2018-03-22 PROCEDURE — 83615 LACTATE (LD) (LDH) ENZYME: CPT | Performed by: NURSE PRACTITIONER

## 2018-03-22 PROCEDURE — 0W993ZZ DRAINAGE OF RIGHT PLEURAL CAVITY, PERCUTANEOUS APPROACH: ICD-10-PCS | Performed by: PHYSICIAN ASSISTANT

## 2018-03-22 PROCEDURE — 83735 ASSAY OF MAGNESIUM: CPT | Performed by: INTERNAL MEDICINE

## 2018-03-22 PROCEDURE — 25000132 ZZH RX MED GY IP 250 OP 250 PS 637: Performed by: THORACIC SURGERY (CARDIOTHORACIC VASCULAR SURGERY)

## 2018-03-22 PROCEDURE — 25000132 ZZH RX MED GY IP 250 OP 250 PS 637: Performed by: ANESTHESIOLOGY

## 2018-03-22 PROCEDURE — 25000132 ZZH RX MED GY IP 250 OP 250 PS 637: Performed by: NURSE PRACTITIONER

## 2018-03-22 PROCEDURE — 85610 PROTHROMBIN TIME: CPT | Performed by: INTERNAL MEDICINE

## 2018-03-22 PROCEDURE — 87102 FUNGUS ISOLATION CULTURE: CPT | Performed by: NURSE PRACTITIONER

## 2018-03-22 PROCEDURE — 25000131 ZZH RX MED GY IP 250 OP 636 PS 637: Performed by: SURGERY

## 2018-03-22 PROCEDURE — 12000006 ZZH R&B IMCU INTERMEDIATE UMMC

## 2018-03-22 RX ORDER — WARFARIN SODIUM 5 MG/1
10 TABLET ORAL
Status: COMPLETED | OUTPATIENT
Start: 2018-03-22 | End: 2018-03-22

## 2018-03-22 RX ORDER — AMOXICILLIN 250 MG
2 CAPSULE ORAL 2 TIMES DAILY
Status: DISCONTINUED | OUTPATIENT
Start: 2018-03-22 | End: 2018-03-23

## 2018-03-22 RX ORDER — LIDOCAINE HYDROCHLORIDE 10 MG/ML
1-30 INJECTION, SOLUTION EPIDURAL; INFILTRATION; INTRACAUDAL; PERINEURAL
Status: COMPLETED | OUTPATIENT
Start: 2018-03-22 | End: 2018-03-22

## 2018-03-22 RX ORDER — SODIUM CHLORIDE 9 MG/ML
INJECTION, SOLUTION INTRAVENOUS CONTINUOUS
Status: DISCONTINUED | OUTPATIENT
Start: 2018-03-22 | End: 2018-03-24 | Stop reason: HOSPADM

## 2018-03-22 RX ORDER — POLYETHYLENE GLYCOL 3350 17 G/17G
17 POWDER, FOR SOLUTION ORAL 2 TIMES DAILY
Status: DISCONTINUED | OUTPATIENT
Start: 2018-03-22 | End: 2018-03-23

## 2018-03-22 RX ADMIN — Medication 1 TABLET: at 11:39

## 2018-03-22 RX ADMIN — LEVALBUTEROL TARTRATE 2 PUFF: 45 AEROSOL, METERED ORAL at 09:16

## 2018-03-22 RX ADMIN — MYCOPHENOLATE MOFETIL 1500 MG: 250 CAPSULE ORAL at 17:28

## 2018-03-22 RX ADMIN — NYSTATIN 1000000 UNITS: 500000 SUSPENSION ORAL at 17:28

## 2018-03-22 RX ADMIN — SULFAMETHOXAZOLE AND TRIMETHOPRIM 1 TABLET: 400; 80 TABLET ORAL at 09:13

## 2018-03-22 RX ADMIN — MYCOPHENOLATE MOFETIL 1500 MG: 250 CAPSULE ORAL at 09:16

## 2018-03-22 RX ADMIN — LIDOCAINE HYDROCHLORIDE 5 ML: 10 INJECTION, SOLUTION EPIDURAL; INFILTRATION; INTRACAUDAL; PERINEURAL at 11:06

## 2018-03-22 RX ADMIN — BUMETANIDE 1 MG: 1 TABLET ORAL at 09:13

## 2018-03-22 RX ADMIN — POLYETHYLENE GLYCOL 3350 17 G: 17 POWDER, FOR SOLUTION ORAL at 19:57

## 2018-03-22 RX ADMIN — Medication 1 TABLET: at 17:31

## 2018-03-22 RX ADMIN — METOPROLOL TARTRATE 25 MG: 25 TABLET, FILM COATED ORAL at 11:39

## 2018-03-22 RX ADMIN — METOPROLOL TARTRATE 25 MG: 25 TABLET, FILM COATED ORAL at 23:32

## 2018-03-22 RX ADMIN — LEVALBUTEROL TARTRATE 2 PUFF: 45 AEROSOL, METERED ORAL at 20:04

## 2018-03-22 RX ADMIN — VALGANCICLOVIR 900 MG: 450 TABLET, FILM COATED ORAL at 09:14

## 2018-03-22 RX ADMIN — LEVALBUTEROL TARTRATE 2 PUFF: 45 AEROSOL, METERED ORAL at 17:27

## 2018-03-22 RX ADMIN — PREDNISONE 12.5 MG: 2.5 TABLET ORAL at 09:15

## 2018-03-22 RX ADMIN — NYSTATIN 1000000 UNITS: 500000 SUSPENSION ORAL at 23:13

## 2018-03-22 RX ADMIN — TACROLIMUS 5 MG: 5 CAPSULE ORAL at 09:14

## 2018-03-22 RX ADMIN — ENOXAPARIN SODIUM 50 MG: 60 INJECTION SUBCUTANEOUS at 19:57

## 2018-03-22 RX ADMIN — MAGNESIUM OXIDE TAB 400 MG (241.3 MG ELEMENTAL MG) 400 MG: 400 (241.3 MG) TAB at 11:39

## 2018-03-22 RX ADMIN — WARFARIN SODIUM 10 MG: 5 TABLET ORAL at 17:31

## 2018-03-22 RX ADMIN — QUETIAPINE 50 MG: 50 TABLET ORAL at 23:13

## 2018-03-22 RX ADMIN — NYSTATIN 1000000 UNITS: 500000 SUSPENSION ORAL at 11:30

## 2018-03-22 RX ADMIN — SENNOSIDES AND DOCUSATE SODIUM 2 TABLET: 8.6; 5 TABLET ORAL at 11:39

## 2018-03-22 RX ADMIN — PREDNISONE 10 MG: 10 TABLET ORAL at 17:30

## 2018-03-22 RX ADMIN — POLYETHYLENE GLYCOL 3350 17 G: 17 POWDER, FOR SOLUTION ORAL at 11:40

## 2018-03-22 RX ADMIN — MULTIPLE VITAMINS W/ MINERALS TAB 1 TABLET: TAB at 11:39

## 2018-03-22 RX ADMIN — LEVALBUTEROL TARTRATE 2 PUFF: 45 AEROSOL, METERED ORAL at 11:41

## 2018-03-22 RX ADMIN — SENNOSIDES AND DOCUSATE SODIUM 2 TABLET: 8.6; 5 TABLET ORAL at 19:57

## 2018-03-22 RX ADMIN — TACROLIMUS 5.5 MG: 5 CAPSULE ORAL at 17:30

## 2018-03-22 RX ADMIN — PANTOPRAZOLE SODIUM 40 MG: 40 TABLET, DELAYED RELEASE ORAL at 09:13

## 2018-03-22 ASSESSMENT — ACTIVITIES OF DAILY LIVING (ADL)
ADLS_ACUITY_SCORE: 11

## 2018-03-22 NOTE — PROGRESS NOTES
Pulmonary Medicine  Cystic Fibrosis - Lung Transplant Daily Progress Note   March 22, 2018       Patient: Kecia Blue  MRN: [id number]  Transplant Date: 3/1/2018 (POD# 21)  Admission date: 2/21/2018  Hospital Day #29          Assessment and Plan:     Kecia Blue is a 58 y/o female w/PMHx significant for dermatomyositis (on immunosuppression), seronegative RA, ILD, and pulmonary hypertension who was admitted for emergent lung transplant w/u on 2/21 for increased SOB and hypoxia. Required V-A ECMO for right heart failure on 2/27. Now s/p BSLT on 3/1 with ECMO decannulation on 3/3. Post-op course c/b hemodynamic instability requiring pressors and iFlolan, transitioned to Revatio now d/c'd. Extubated POD #5 and weaned to RA with continued improvement in conditioning. Persistent bilateral pleural effusions now requiring thoracentesis on the right prior to discharge to ARU.          S/p bilateral sequential lung transplant (3/1/18) for acute hypoxic/hypercapneic respiratory failure 2/2 ILD:  Post-op Pulm HTN 2/2 ILD:   Bilateral pleural effusion  Adequate graft function. Weaned off iFlolan but continues on PO Revatio. Extubated 3/6 and has remained stable on RA with only mild DESHPANDE. Last chest tube removed 3/13. Surveillance bronchoscopy 3/13 with bilateral anastomoses intact and patent, thick yellow secretions around right anastomosis, suctioned and removed. Pleural effusion noted on imaging, possible hemothorax given drifting hgb (see below).   - CXR 3/20 with persistent bilateral effusions R>L with right increased in size, continued opacities to left mid lung, and slightly increased pulmonary edema -- personally reviewed by me  - IR for right-sided therapeutic / diagnostic thoracentesis today. Labs and cultures ordered  - Echo 3/20 displaying normal RV and LV function. Discontinued Revatio 3/21.   - Diuretics as noted below  - Continue aggressive pulmonary toilet with acapella/incentive spirometry q 1hr  while awake.                      Continue 3-drug immunosuppression:  - Tacrolimus 5 mg qAM / 5.5 mg qPM. Target goal 10-15. Next level 3/22.  - MMF 1500 g BID   - Reduce prednisone dose per taper below. Steroid taper below:  Date AM (mg) PM (mg)   3/22/18 12.5 10   4/26/18 10 10   5/24/18 10 7.5   6/22/18 7.5 7.5   7/20/18 7.5 5   8/24/18 5 5   9/21/18 5 2.5      Prophylaxis:   PJP: Bactrim  CMV: vangancyclovir  Fungal: Nystatin     Donor Recipient Intervention   CMV status  Pos   Pos  valgancyclovir POD #8-90   EBV status Pos Pos Immune    HSV status N/A  Pos Immune       Infectious disease:  SIRS, Resolved:  No known infectious disease history. Post-operative SIRS likely a response from multiple procedures (VA ECMO cannulation and decannulation) and transplant surgery. Remains afebrile and without leukocytosis. Negative recipient bronch culture from time of transplant.  Donor culture at OSH grew MRSA, Strep pneumoniae, Moraxella catarrhalis, and Haemophilus species. Donor culture at [facility] grew MRSA. S/p 2-week course of vancomycin and pip/tazo.       RADHA, Resolved:  Volume overload, Resolved:  Suspect secondary to meds and diuresis. Serum Cr+ and BLE edema now back to WNL. LE edema likely 2/2 low albumin state. Improved with diuretics. Received additional dose of Bumex 3/20.   - Bumex 1 mg PO daily   - Trend BMP daily  - Avoid nephrotoxic agents as possible    LUE DVT, Provoked:  Noted on UE ultrasound (3/7). Received heparin gtt followed by enoxaparin SQ as bridge to therapeutic INR. Warfarin held x 2 nights d/t IR procedure today.   - Warfarin, pharm to dose.   - INR goal 2-3. Will need 3-months of anticoagulation      Acute anemia:  Hgb with slow drift to 7's. No evidence of bleeding. Likely 2/2 multiple lab draws, bone marrow suppressive medications. Received transfusion 3/8 and 3/15 with appropriate response. Stable hgb low 9s.  - Trend hgb daily    Hypomagnesemia:   Likely r/t CNI.  - Mg oxide 400 mg  "daily    Diarrhea, Resolved:  Large bowel loops on imaging:  Painful defecation, Resolved:  Likely 2/2 antibiotics, immunosuppression agents and/or TFs. Pain while defecation could be anal fissure versus hemorrhoid, now resolved. C diff negative. Abd XR on 3/15 with decrease gaseous distention and stool burden. Overall improved but gas bubble persists on XR.  - Repeat Abd XR this morning, personally reviewed by me, revealing mod-lg amt of stool in colon and distal small bowel with gas bubbles in the proximal portion of small intestine.   - Start bowel regimen: Miralax BID, Senakot daily.   - Desitin cream daily BID prn  - Imodium prn         FEN: Regular diet, electrolyte protocol  PPX: Warfarin, PPI   CODE: Full  DISPO: Inpatient cares. ARU versus home with OP PT likely tomorrow       Patient discussed with Dr. Riley.    ADRIÁN Amato, CNP  Inpatient Nurse Practitioner  [facility]  Pager [phone]      ADDENDUM: therapeutic thoracentesis in IR with ~1500cc removed, cultures sent. Will resume warfarin and enoxaparin (as bridge) this evening.     ADDENDUM: tacrolimus level 9.3 today. Will increase dose to 5.5mg BID.   LEO Najera        Subjective & Interval History:     Last 24 hour vital signs, labs and care team notes reviewed.    No acute events overnight. Reports abdominal \"bloating\" pushing upward into her diaphragm making her mildly short of breath. Denies nausea, vomiting. Thoracentesis later this AM.            Review of Systems:     C: no fever, no chills, no change in weight, improving appetite  INTEGUMENTARY/SKIN: no rash or obvious new lesions  ENT/MOUTH: no sore throat, no sinus pain, no nasal drainage  RESP: occasional cough with rare sputum production, + mild SOB (d/t abd discomfort)  CV: no chest pain, no palpitations, no peripheral edema, no orthopnea  GI: no nausea, no vomiting, no change in stools, + bloating, mild abdominal discomfort  : no dysuria  MUSCULOSKELETAL: no myalgias, no " arthralgias  ENDOCRINE: blood sugars with adequate control  NEURO: no headache, no numbness or tingling  PSYCHIATRIC: mood stable          Medications:     Active Medications:    sodium chloride (PF)  3 mL Intracatheter Q8H     polyethylene glycol  17 g Oral BID     senna-docusate  2 tablet Oral BID     enoxaparin  1 mg/kg (Dosing Weight) Subcutaneous Q12H     warfarin  10 mg Oral ONCE at 18:00     predniSONE  12.5 mg Oral Daily     predniSONE  10 mg Oral QPM     multivitamin, therapeutic with minerals  1 tablet Oral Daily     calcium-vitamin D  1 tablet Oral BID w/meals     magnesium oxide  400 mg Oral Daily     tacrolimus  5 mg Oral QAM     tacrolimus  5.5 mg Oral QPM     pantoprazole  40 mg Oral QAM     valGANciclovir  900 mg Oral Daily     bumetanide  1 mg Oral Daily     sulfamethoxazole-trimethoprim  1 tablet Oral or NG Tube Daily     metoprolol tartrate  25 mg Oral BID     QUEtiapine  50 mg Oral At Bedtime     levalbuterol  2 puff Inhalation Q4H JULES     nystatin  1,000,000 Units Swish & Swallow 4x Daily     mycophenolate  1,500 mg Oral BID IS     Active PRN Medications:  sodium chloride (PF), - MEDICATION INSTRUCTIONS -, polyethylene glycol, Warfarin Therapy Reminder, potassium phosphate (KPHOS) in D5W IV, potassium phosphate (KPHOS) in D5W IV, potassium phosphate (KPHOS) in D5W IV, potassium phosphate (KPHOS) in D5W IV, potassium phosphate (KPHOS) in D5W IV, naloxone, acetaminophen, ondansetron **OR** ondansetron, glucose **OR** dextrose **OR** glucagon, potassium chloride, potassium chloride, potassium chloride, potassium chloride with lidocaine, potassium chloride, magnesium sulfate, magnesium sulfate         Physical Exam:     Constitutional: Awake, alert, in no apparent distress.   HEENT: Eyes with pink conjunctivae, anicteric. Oral mucosa moist without lesions. Neck supple without lymphadenopathy.   PULM: Good air flow bilaterally. Crackles RLL, otherwise clear lung sounds. Non-labored breathing on  "RA.  CV: Normal S1 and S2. RRR.  No murmur, gallop, or rub.  No peripheral edema.   ABD: Faint BS, soft, nontender, + distended.  No guarding.   MSK: Moves all extremities.  No apparent muscle wasting.   NEURO: Alert and oriented x 4, conversant.   SKIN: Warm, dry.  No rash on limited exam.   PSYCH: Mood stable.?     Lines, Drains, and Devices:  Peripheral IV 03/10/18 Right Upper forearm (Active)   Site Assessment [facility] 3/20/2018 12:15 PM   Line Status Saline locked 3/20/2018 12:15 PM   Phlebitis Scale 0-->no symptoms 3/20/2018 12:15 PM   Infiltration Scale 0 3/20/2018 12:15 PM   Infiltration Site Treatment Method  None 3/16/2018 11:30 AM   Extravasation? No 3/20/2018 12:15 PM   Number of days:10       Peripheral IV 03/15/18 Right;Posterior Lower forearm (Active)   Site Assessment [facility] 3/20/2018 12:15 PM   Line Status Saline locked 3/20/2018 12:15 PM   Phlebitis Scale 0-->no symptoms 3/20/2018 12:15 PM   Infiltration Scale 0 3/20/2018 12:15 PM   Infiltration Site Treatment Method  None 3/16/2018 11:30 AM   Extravasation? No 3/20/2018 12:15 PM   Number of days:5          Data:     All vital signs, laboratory and imaging data for the past 24 hours reviewed.      Vital signs:  Temp: 98.2  F (36.8  C) Temp src: Oral BP: 98/68   Heart Rate: 113 Resp: 20 SpO2: 95 % O2 Device: None (Room air) Oxygen Delivery: 3 LPM Height: 160 cm (5' 3\") Weight: 55.7 kg (122 lb 14.4 oz)    Pain: # Pain Assessment:   Current Pain Score 3/22/2018 3/21/2018 3/21/2018   Patient currently in pain? denies denies denies   Pain location - - -   Pain descriptors - - -   CPOT pain score - - -   Kecia rivas pain level was assessed and she currently denies pain.      Weight trend:   Vitals:    03/20/18 0330 03/21/18 0451 03/22/18 0131   Weight: 57.4 kg (126 lb 8.7 oz) 56.5 kg (124 lb 8 oz) 55.7 kg (122 lb 14.4 oz)        I/O:   Intake/Output Summary (Last 24 hours) at 03/20/18 1503  Last data filed at 03/20/18 1000   Gross per 24 hour   Intake              " 480 ml   Output              800 ml   Net             -320 ml       Labs    CMP:     Recent Labs  Lab 03/22/18  0630 03/21/18  2333 03/21/18  0529 03/20/18  0456 03/19/18  0450     --  139 139 139   POTASSIUM 4.8  --  4.9 5.0 5.2   CHLORIDE 106  --  107 106 107   CO2 21  --  22 23 23   ANIONGAP 11  --  10 10 10   *  --  108* 121* 115*   BUN 32*  --  36* 34* 36*   CR 1.03  --  1.18* 0.94 0.93   GFRESTIMATED 56*  --  48* 62 62   GFRESTBLACK 67  --  57* 75 75   CHELSEY 8.4*  --  8.9 8.7 8.8   MAG 2.0 2.4* 1.6 1.7 1.9   PHOS 3.0  --  3.3 3.7 3.8   PROTTOTAL  --   --  5.6*  --   --    ALBUMIN 2.3*  --  2.5* 2.5* 2.5*   ALT 24  --  26 25 21       CBC:     Recent Labs  Lab 03/22/18  0630 03/21/18  0529 03/20/18  0456 03/19/18  0450   WBC 5.1 4.8 5.4 7.2   RBC 2.99* 2.85* 3.07* 2.88*   HGB 9.3* 9.0* 9.6* 8.9*   HCT 30.5* 29.3* 31.3* 29.5*   * 103* 102* 102*   MCH 31.1 31.6 31.3 30.9   MCHC 30.5* 30.7* 30.7* 30.2*   RDW 20.8* 21.3* 21.4* 21.5*    298 250 231       INR:     Recent Labs  Lab 03/22/18  0630 03/21/18  1146 03/21/18  0529 03/20/18  0456   INR 1.25* 1.85* 2.67* 2.82*       Glucose:   Recent Labs  Lab 03/22/18  0630 03/21/18  0529 03/20/18  0456 03/19/18  0450 03/18/18  2326 03/18/18  1750 03/18/18  1316 03/18/18  0906 03/18/18  0534 03/18/18  0042 03/17/18  2114  03/17/18  0604   * 108* 121* 115*  --   --   --   --  137*  --   --   --  125*   BGM  --   --   --   --  163* 147* 132* 158*  --  139* 151*  < >  --    < > = values in this interval not displayed.    Blood Gas: No lab results found in last 7 days.    Culture Data:     Recent Labs  Lab 03/22/18  1049   CULT PENDING  PENDING       Virology Data:   Lab Results   Component Value Date    FLUAH1 Negative 02/27/2018    FLUAH3 Negative 02/27/2018    EB0256 Negative 02/27/2018    IFLUB Negative 02/27/2018    RSVA Negative 02/27/2018    RSVB Negative 02/27/2018    PIV1 Negative 02/27/2018    PIV2 Negative 02/27/2018    PIV3 Negative  02/27/2018    HMPV Negative 02/27/2018    HRVS Negative 02/27/2018    ADVBE Negative 02/27/2018    ADVC Negative 02/27/2018       Historical CMV results (last 3 of prior testing):  Lab Results   Component Value Date    CMVQNT Canceled, Test credited (A) 03/01/2018    CMVQNT Canceled, Test credited (A) 03/01/2018     Lab Results   Component Value Date    CMVLOG Canceled, Test credited 03/01/2018    CMVLOG Canceled, Test credited 03/01/2018       Urine Studies    Recent Labs   Lab Test  03/04/18   1425   URINEPH  5.5   NITRITE  Negative   LEUKEST  Negative   WBCU  5

## 2018-03-22 NOTE — CONSULTS
Patient is on IR schedule 3/22/2018 for a therapeutic right thoracentesis. Per plan, will check left hemithorax, and perform left therapeutic thoracentesis if indicated.    Labs WNL for procedure.     No NPO required.  Medications to be held include: Warfarin.  Consent will be done prior to procedure.    Please contact the IR charge RN at [phone] for estimated time of procedure.     Case discussed with primary team.    Yves Marroquin PA-C  Interventional Radiology  [phone] pgr.

## 2018-03-22 NOTE — PROGRESS NOTES
Patient Name: Kecia Blue  Medical Record Number: [id number]  Today's Date: 3/22/2018    Procedure: Right thoracentesis  Proceduralist: BAILEE Shipley PA-C    Sedation medications administered: None    Procedure start time: 1043  Puncture time: 1046  Procedure end time: 10:59    Report given to:  Regina GIL 6B  :  None    Other Notes: Pt arrived to IR room 2 from . Consent obtained by BAILEE Shipley PA-C.  Pt denies any further questions or concerns regarding procedure. Pt positioned sitting uprght, prepped and monitored per protocol. Specimens collected as ordered and sent to the lab.1470 ml pleural fluid aspirated from the right chest.  Pt tolerated procedure without any noted complications. Pt transferred back to 6B.

## 2018-03-22 NOTE — PLAN OF CARE
Problem: Patient Care Overview  Goal: Plan of Care/Patient Progress Review  PT 6B: CANCEL; pt declining session following procedure today and feeling quite fatigued. Will reschedule.

## 2018-03-22 NOTE — PLAN OF CARE
"Problem: Patient Care Overview  Goal: Plan of Care/Patient Progress Review  Outcome: No Change  /80 (BP Location: Right arm)  Pulse 106  Temp 97.7  F (36.5  C) (Oral)  Resp 20  Ht 1.6 m (5' 3\")  Wt 55.7 kg (122 lb 14.4 oz)  SpO2 96%  BMI 21.77 kg/m2  Neuro: A&Ox4.   Cardiac: SR. VSS.   Respiratory: Sating at 97% on RA.  GI/: Adequate urine output. BM X1  Diet/appetite: Tolerating regular diet.  Activity:  Assist of 1, up to commode  Pain: At acceptable level on current regimen.   Skin:Suture to be removed by provider before discharge.no new deficits noted.  LDA's: 1 piv     Plan:Pt will have thoracentesis this morning, before D/c to [facility]  Continue with POC. Notify primary team with changes.    Problem: Chronic Respiratory Difficulty Comorbidity  Goal: Chronic Respiratory Difficulty  Patient comorbidity will be monitored for signs and symptoms of Respiratory Difficulty (Chronic) condition.  Problems will be absent, minimized or managed by discharge/transition of care.   Outcome: No Change  Pt is sating fine on RA, although there is a slight alteration in breathing rate with exertion.          "

## 2018-03-22 NOTE — PLAN OF CARE
Problem: Patient Care Overview  Goal: Plan of Care/Patient Progress Review  OT 6B: Cancel - pt attempted this AM however having new abdominal pain needing further imaging prior to participation in therapies, upon check back pt off the unit for thoracentesis.

## 2018-03-22 NOTE — PROCEDURES
Interventional Radiology Brief Post Procedure Note    Procedure: IR THORACENTESIS    Proceduralist: Manas Shipley PA-C    Assistant: None    Time Out: Prior to the start of the procedure and with procedural staff participation, I verbally confirmed the patient s identity using two indicators, relevant allergies, that the procedure was appropriate and matched the consent or emergent situation, and that the correct equipment/implants were available. Immediately prior to starting the procedure I conducted the Time Out with the procedural staff and re-confirmed the patient s name, procedure, and site/side. (The Joint Commission universal protocol was followed.)  Yes    Medications   Medication Event Details Admin User Admin Time       Sedation: None. Local Anesthestic used    Findings: Completed ultrasound guided therapeutic and diagnostic thoracentesis. A total of 1470 mL of clear red fluid was aspirated from the right pleural space without difficulty. The left pleural was evaluated and there was not a safe pocket of fluid to access for drainage at this time. A sample of 120 mL was sent to lab for evaluation. Patient tolerated the procedure well.     Estimated Blood Loss: Minimal    SPECIMENS: None    Complications: 1. None     Condition: Stable    Plan: Follow-up per primary team.     Comments: See dictated procedure note for full details.    Manas Shipley PA-C

## 2018-03-22 NOTE — PLAN OF CARE
Problem: Patient Care Overview  Goal: Plan of Care/Patient Progress Review  Neuro: A&Ox4.   Cardiac: SR. VSS. tachycardic at times.    Respiratory: Sating 96% on RA. Denied sob or any difficulty of breathing.   GI/: Adequate urine output. BM X2  Diet/appetite: Tolerating regular diet. Eating well.  Activity:  Assist of one, up to chair and in halls. Worked with PT/OT.   Pain: denied pain or discomfort.   Skin: Intact, no new deficits noted. Suture to be removed by MD tomorrow. Pt also will have thoracentesis in the morning.   LDA's: 2 PIV SL's.   Plan: Continue with POC. Notify primary team with changes.

## 2018-03-23 ENCOUNTER — APPOINTMENT (OUTPATIENT)
Dept: GENERAL RADIOLOGY | Facility: CLINIC | Age: 58
DRG: 003 | End: 2018-03-23
Attending: SURGERY
Payer: COMMERCIAL

## 2018-03-23 ENCOUNTER — APPOINTMENT (OUTPATIENT)
Dept: OCCUPATIONAL THERAPY | Facility: CLINIC | Age: 58
DRG: 003 | End: 2018-03-23
Attending: INTERNAL MEDICINE
Payer: COMMERCIAL

## 2018-03-23 LAB
ALBUMIN SERPL-MCNC: 2.4 G/DL (ref 3.4–5)
ALT SERPL W P-5'-P-CCNC: 28 U/L (ref 0–50)
ANION GAP SERPL CALCULATED.3IONS-SCNC: 8 MMOL/L (ref 3–14)
BUN SERPL-MCNC: 30 MG/DL (ref 7–30)
CALCIUM SERPL-MCNC: 8.5 MG/DL (ref 8.5–10.1)
CHLORIDE SERPL-SCNC: 105 MMOL/L (ref 94–109)
CO2 SERPL-SCNC: 24 MMOL/L (ref 20–32)
CREAT SERPL-MCNC: 1 MG/DL (ref 0.52–1.04)
ERYTHROCYTE [DISTWIDTH] IN BLOOD BY AUTOMATED COUNT: 20.6 % (ref 10–15)
GFR SERPL CREATININE-BSD FRML MDRD: 58 ML/MIN/1.7M2
GLUCOSE SERPL-MCNC: 110 MG/DL (ref 70–99)
HCT VFR BLD AUTO: 32.3 % (ref 35–47)
HGB BLD-MCNC: 9.9 G/DL (ref 11.7–15.7)
INR PPP: 1.61 (ref 0.86–1.14)
LACTATE BLD-SCNC: 1.7 MMOL/L (ref 0.4–1.9)
MAGNESIUM SERPL-MCNC: 1.8 MG/DL (ref 1.6–2.3)
MCH RBC QN AUTO: 31.4 PG (ref 26.5–33)
MCHC RBC AUTO-ENTMCNC: 30.7 G/DL (ref 31.5–36.5)
MCV RBC AUTO: 103 FL (ref 78–100)
PHOSPHATE SERPL-MCNC: 3.1 MG/DL (ref 2.5–4.5)
PLATELET # BLD AUTO: 358 10E9/L (ref 150–450)
POTASSIUM SERPL-SCNC: 5.2 MMOL/L (ref 3.4–5.3)
RBC # BLD AUTO: 3.15 10E12/L (ref 3.8–5.2)
SODIUM SERPL-SCNC: 137 MMOL/L (ref 133–144)
WBC # BLD AUTO: 5 10E9/L (ref 4–11)

## 2018-03-23 PROCEDURE — 25000132 ZZH RX MED GY IP 250 OP 250 PS 637: Performed by: THORACIC SURGERY (CARDIOTHORACIC VASCULAR SURGERY)

## 2018-03-23 PROCEDURE — 25000132 ZZH RX MED GY IP 250 OP 250 PS 637: Performed by: ANESTHESIOLOGY

## 2018-03-23 PROCEDURE — 25000125 ZZHC RX 250: Performed by: STUDENT IN AN ORGANIZED HEALTH CARE EDUCATION/TRAINING PROGRAM

## 2018-03-23 PROCEDURE — 25000132 ZZH RX MED GY IP 250 OP 250 PS 637: Performed by: NURSE PRACTITIONER

## 2018-03-23 PROCEDURE — 25000128 H RX IP 250 OP 636: Performed by: SURGERY

## 2018-03-23 PROCEDURE — 12000006 ZZH R&B IMCU INTERMEDIATE UMMC

## 2018-03-23 PROCEDURE — 25000131 ZZH RX MED GY IP 250 OP 636 PS 637: Performed by: THORACIC SURGERY (CARDIOTHORACIC VASCULAR SURGERY)

## 2018-03-23 PROCEDURE — 40000133 ZZH STATISTIC OT WARD VISIT

## 2018-03-23 PROCEDURE — 97110 THERAPEUTIC EXERCISES: CPT | Mod: GO

## 2018-03-23 PROCEDURE — 85027 COMPLETE CBC AUTOMATED: CPT | Performed by: INTERNAL MEDICINE

## 2018-03-23 PROCEDURE — 71046 X-RAY EXAM CHEST 2 VIEWS: CPT

## 2018-03-23 PROCEDURE — 85610 PROTHROMBIN TIME: CPT | Performed by: INTERNAL MEDICINE

## 2018-03-23 PROCEDURE — 84460 ALANINE AMINO (ALT) (SGPT): CPT | Performed by: INTERNAL MEDICINE

## 2018-03-23 PROCEDURE — 97535 SELF CARE MNGMENT TRAINING: CPT | Mod: GO

## 2018-03-23 PROCEDURE — 74019 RADEX ABDOMEN 2 VIEWS: CPT

## 2018-03-23 PROCEDURE — 25000132 ZZH RX MED GY IP 250 OP 250 PS 637: Performed by: SURGERY

## 2018-03-23 PROCEDURE — 36415 COLL VENOUS BLD VENIPUNCTURE: CPT | Performed by: INTERNAL MEDICINE

## 2018-03-23 PROCEDURE — 83735 ASSAY OF MAGNESIUM: CPT | Performed by: INTERNAL MEDICINE

## 2018-03-23 PROCEDURE — 25000125 ZZHC RX 250: Performed by: SURGERY

## 2018-03-23 PROCEDURE — 80069 RENAL FUNCTION PANEL: CPT | Performed by: INTERNAL MEDICINE

## 2018-03-23 PROCEDURE — 25000131 ZZH RX MED GY IP 250 OP 636 PS 637: Performed by: SURGERY

## 2018-03-23 RX ORDER — MYCOPHENOLATE MOFETIL 250 MG/1
1500 CAPSULE ORAL 2 TIMES DAILY
Status: ON HOLD | DISCHARGE
Start: 2018-03-23 | End: 2018-03-27

## 2018-03-23 RX ORDER — MAGNESIUM OXIDE 400 MG/1
400 TABLET ORAL DAILY
Qty: 30 TABLET | Status: ON HOLD | DISCHARGE
Start: 2018-03-23 | End: 2018-03-27

## 2018-03-23 RX ORDER — QUETIAPINE FUMARATE 50 MG/1
50 TABLET, FILM COATED ORAL AT BEDTIME
Qty: 120 TABLET | Status: ON HOLD | DISCHARGE
Start: 2018-03-23 | End: 2018-03-27

## 2018-03-23 RX ORDER — POLYETHYLENE GLYCOL 3350 17 G/17G
17 POWDER, FOR SOLUTION ORAL 2 TIMES DAILY
Qty: 60 PACKET | Status: ON HOLD | DISCHARGE
Start: 2018-03-23 | End: 2018-03-27

## 2018-03-23 RX ORDER — AMOXICILLIN 250 MG
2 CAPSULE ORAL AT BEDTIME
Qty: 100 TABLET | Status: ON HOLD | DISCHARGE
Start: 2018-03-23 | End: 2018-03-27

## 2018-03-23 RX ORDER — PANTOPRAZOLE SODIUM 40 MG/1
40 TABLET, DELAYED RELEASE ORAL EVERY MORNING
Qty: 30 TABLET | Status: ON HOLD | DISCHARGE
Start: 2018-03-23 | End: 2018-03-27

## 2018-03-23 RX ORDER — AMOXICILLIN 250 MG
2 CAPSULE ORAL
Status: DISCONTINUED | OUTPATIENT
Start: 2018-03-23 | End: 2018-03-24 | Stop reason: HOSPADM

## 2018-03-23 RX ORDER — BUMETANIDE 1 MG/1
1 TABLET ORAL DAILY
Qty: 30 TABLET | DISCHARGE
Start: 2018-03-23 | End: 2018-03-24

## 2018-03-23 RX ORDER — WARFARIN SODIUM 7.5 MG/1
7.5 TABLET ORAL
Status: COMPLETED | OUTPATIENT
Start: 2018-03-23 | End: 2018-03-23

## 2018-03-23 RX ORDER — TACROLIMUS 0.5 MG/1
5.5 CAPSULE ORAL 2 TIMES DAILY
Status: ON HOLD | DISCHARGE
Start: 2018-03-23 | End: 2018-03-27

## 2018-03-23 RX ORDER — ONDANSETRON 2 MG/ML
4 INJECTION INTRAMUSCULAR; INTRAVENOUS EVERY 6 HOURS PRN
Qty: 15 ML | Status: ON HOLD | DISCHARGE
Start: 2018-03-23 | End: 2018-03-27

## 2018-03-23 RX ORDER — METOPROLOL TARTRATE 25 MG/1
25 TABLET, FILM COATED ORAL 2 TIMES DAILY
Qty: 60 TABLET | Status: ON HOLD | DISCHARGE
Start: 2018-03-23 | End: 2018-03-27

## 2018-03-23 RX ORDER — POLYETHYLENE GLYCOL 3350 17 G/17G
17 POWDER, FOR SOLUTION ORAL DAILY
Status: DISCONTINUED | OUTPATIENT
Start: 2018-03-24 | End: 2018-03-24 | Stop reason: HOSPADM

## 2018-03-23 RX ORDER — LEVALBUTEROL TARTRATE 45 UG/1
2 AEROSOL, METERED ORAL EVERY 4 HOURS
Status: ON HOLD | DISCHARGE
Start: 2018-03-23 | End: 2018-03-27

## 2018-03-23 RX ORDER — PREDNISONE 5 MG/1
TABLET ORAL
Status: ON HOLD | DISCHARGE
Start: 2018-03-23 | End: 2018-03-27

## 2018-03-23 RX ORDER — VALGANCICLOVIR 450 MG/1
900 TABLET, FILM COATED ORAL DAILY
Status: ON HOLD | DISCHARGE
Start: 2018-03-23 | End: 2018-03-27

## 2018-03-23 RX ORDER — SULFAMETHOXAZOLE AND TRIMETHOPRIM 400; 80 MG/1; MG/1
1 TABLET ORAL DAILY
Refills: 0 | Status: ON HOLD | DISCHARGE
Start: 2018-03-23 | End: 2018-03-27

## 2018-03-23 RX ADMIN — NYSTATIN 1000000 UNITS: 500000 SUSPENSION ORAL at 21:52

## 2018-03-23 RX ADMIN — BUMETANIDE 1 MG: 1 TABLET ORAL at 08:51

## 2018-03-23 RX ADMIN — LEVALBUTEROL TARTRATE 2 PUFF: 45 AEROSOL, METERED ORAL at 08:56

## 2018-03-23 RX ADMIN — METOPROLOL TARTRATE 25 MG: 25 TABLET, FILM COATED ORAL at 23:46

## 2018-03-23 RX ADMIN — PREDNISONE 12.5 MG: 2.5 TABLET ORAL at 08:52

## 2018-03-23 RX ADMIN — LEVALBUTEROL TARTRATE 2 PUFF: 45 AEROSOL, METERED ORAL at 20:23

## 2018-03-23 RX ADMIN — VALGANCICLOVIR 900 MG: 450 TABLET, FILM COATED ORAL at 08:50

## 2018-03-23 RX ADMIN — POLYETHYLENE GLYCOL 3350 17 G: 17 POWDER, FOR SOLUTION ORAL at 09:08

## 2018-03-23 RX ADMIN — Medication 2 G: at 09:30

## 2018-03-23 RX ADMIN — PREDNISONE 10 MG: 10 TABLET ORAL at 18:05

## 2018-03-23 RX ADMIN — NYSTATIN 1000000 UNITS: 500000 SUSPENSION ORAL at 08:52

## 2018-03-23 RX ADMIN — NYSTATIN 1000000 UNITS: 500000 SUSPENSION ORAL at 11:53

## 2018-03-23 RX ADMIN — QUETIAPINE 50 MG: 50 TABLET ORAL at 21:52

## 2018-03-23 RX ADMIN — LEVALBUTEROL TARTRATE 2 PUFF: 45 AEROSOL, METERED ORAL at 11:55

## 2018-03-23 RX ADMIN — Medication 1 TABLET: at 18:05

## 2018-03-23 RX ADMIN — ENOXAPARIN SODIUM 50 MG: 60 INJECTION SUBCUTANEOUS at 08:53

## 2018-03-23 RX ADMIN — MYCOPHENOLATE MOFETIL 1500 MG: 250 CAPSULE ORAL at 08:50

## 2018-03-23 RX ADMIN — WARFARIN SODIUM 7.5 MG: 7.5 TABLET ORAL at 18:05

## 2018-03-23 RX ADMIN — TACROLIMUS 5.5 MG: 5 CAPSULE ORAL at 18:05

## 2018-03-23 RX ADMIN — TACROLIMUS 5.5 MG: 5 CAPSULE ORAL at 08:50

## 2018-03-23 RX ADMIN — MAGNESIUM OXIDE TAB 400 MG (241.3 MG ELEMENTAL MG) 400 MG: 400 (241.3 MG) TAB at 11:53

## 2018-03-23 RX ADMIN — NYSTATIN 1000000 UNITS: 500000 SUSPENSION ORAL at 16:00

## 2018-03-23 RX ADMIN — ENOXAPARIN SODIUM 50 MG: 60 INJECTION SUBCUTANEOUS at 20:23

## 2018-03-23 RX ADMIN — MYCOPHENOLATE MOFETIL 1500 MG: 250 CAPSULE ORAL at 18:05

## 2018-03-23 RX ADMIN — PANTOPRAZOLE SODIUM 40 MG: 40 TABLET, DELAYED RELEASE ORAL at 08:51

## 2018-03-23 RX ADMIN — MULTIPLE VITAMINS W/ MINERALS TAB 1 TABLET: TAB at 08:52

## 2018-03-23 RX ADMIN — LEVALBUTEROL TARTRATE 2 PUFF: 45 AEROSOL, METERED ORAL at 16:00

## 2018-03-23 RX ADMIN — METOPROLOL TARTRATE 25 MG: 25 TABLET, FILM COATED ORAL at 11:53

## 2018-03-23 RX ADMIN — Medication 1 TABLET: at 08:51

## 2018-03-23 RX ADMIN — SULFAMETHOXAZOLE AND TRIMETHOPRIM 1 TABLET: 400; 80 TABLET ORAL at 08:51

## 2018-03-23 ASSESSMENT — ACTIVITIES OF DAILY LIVING (ADL)
ADLS_ACUITY_SCORE: 11

## 2018-03-23 NOTE — PLAN OF CARE
"Problem: Patient Care Overview  Goal: Plan of Care/Patient Progress Review  Outcome: No Change  Blood pressure 110/78, pulse 106, temperature 97.8  F (36.6  C), temperature source Oral, resp. rate 25, height 1.6 m (5' 3\"), weight 55.7 kg (122 lb 14.4 oz), SpO2 97 %.    Pt VSS, on RA.  RR tachypneic at baseline, denies SOB.  Denies any pain.  Ambulated in halls x 2.  Sternal incision CDI.  Good appetite.  Voiding adequately.  Loose stools.  Mag replaced per protocol.   at bedside, assisting pt with cares.  Plan for d/c tomorrow at 1300.  Call light within reach.  Continue with plan of care.     Problem: Chronic Respiratory Difficulty Comorbidity  Goal: Chronic Respiratory Difficulty  Patient comorbidity will be monitored for signs and symptoms of Respiratory Difficulty (Chronic) condition.  Problems will be absent, minimized or managed by discharge/transition of care.   Pt on RA, denies SOB, RR tachypneic.       "

## 2018-03-23 NOTE — PROGRESS NOTES
Patient Name:  Kecia Blue     Anticipated Discharge Date:  3/24/18    Discharge Disposition:   ARU:  [place]    Transportation Needs: Yes- W/C  Name of Transportation Company and Phone: [employer] [phone] Ride has been set up for 1pm.      Additional Services/Equipment Arranged:  None     Persons notified of above discharge plan:  The pt and the medical team.    Patient / Family response to discharge plan:  Pt is looking forward to rehab.     Education provided by LUANA at discharge: role of transplant  in out patient setting and provided contact info for , and transplant coordinator.  Provided [employer] Donor Letter Writing packet : NO    Patient and family discharge goal: To get stronger and move to the [place] apartments to continue her recovery from lung tx.  Provided Education on discharge plan: YES  Patient agreeable to discharge plan:  YES  A list of Medicare Certified Facilities was provided to the patient and/or family to encourage patient choice. Patient's choices for facility are: [facility]  Will NH provide Skilled rehabilitation or complex medical:  YES  General information regarding anticipated insurance coverage and possible out of pocket cost was discussed. Patient and patient's family are aware patient may incur the cost of transportation to the facility, pending insurance payment: YES  Barriers to discharge: None    CTS Handoff completed:  YES    Medicare Notice of Rights provided to the patient/family:  NO    VERENICE Almaraz  Covering for Redd S.  [phone]

## 2018-03-23 NOTE — PROGRESS NOTES
Pulmonary Medicine  Cystic Fibrosis - Lung Transplant Daily Progress Note   March 23, 2018       Patient: Kecia Blue  MRN: [id number]  Transplant Date: 3/1/2018 (POD# 22)  Admission date: 2/21/2018  Hospital Day #30          Assessment and Plan:     Kecia Blue is a 56 y/o female w/PMHx significant for dermatomyositis (on immunosuppression), seronegative RA, ILD, and pulmonary hypertension who was admitted for emergent lung transplant w/u on 2/21 for increased SOB and hypoxia. Required V-A ECMO for right heart failure on 2/27. Now s/p BSLT on 3/1 with ECMO decannulation on 3/3. Post-op course c/b hemodynamic instability requiring pressors and iFlolan, transitioned to Revatio now d/c'd. Right sided thoracentesis 3/22. Continues to make good progress overall. Ready for ARU placement, waiting bed availability.          S/p bilateral sequential lung transplant (3/1/18) for acute hypoxic/hypercapneic respiratory failure 2/2 ILD:  Post-op Pulm HTN 2/2 ILD:   Right sided pleural effusion, Resolved:  Adequate graft function. Weaned off iFlolan and PO Revatio as RV has returned to normal function. Extubated 3/6 and has remained stable on RA with minimal DESHPANDE. Surveillance bronchoscopy 3/13 with bilateral anastomoses intact and patent, thick yellow secretions around right anastomosis, suctioned and removed. Right-sided pleural effusion noted on imaging. S/p thoracentesis ~ 1.5 liters removed on 3/22.   - CXR 3/23 with resolution of R-sided pleural effusion, continued opacities to left mid lung, unchanged from prior imaging -- personally reviewed by me  - Pleural cultures NGTD, however neutrophil dominate (anticipate lymphocyte dominate so close to time of transplant). Continue to follow closely.   - Diuretics as noted below  - Continue aggressive pulmonary toilet with acapella/incentive spirometry q 1hr while awake.                      Continue 3-drug immunosuppression:  - Tacrolimus 5.5mg BID. Target goal  10-15. Tacrolimus drug levels every Mon/Thurs  - MMF 1500 g BID   - Prednisone 12.5mg qAM / 10mg qPM. Steroid taper below:  Date AM (mg) PM (mg)   3/22/18 12.5 10   4/26/18 10 10   5/24/18 10 7.5   6/22/18 7.5 7.5   7/20/18 7.5 5   8/24/18 5 5   9/21/18 5 2.5      Prophylaxis:   PJP: Bactrim  CMV: vangancyclovir  Fungal: Nystatin     Donor Recipient Intervention   CMV status  Pos   Pos  valgancyclovir POD #8-90   EBV status Pos Pos Immune    HSV status N/A  Pos Immune       Infectious disease:  SIRS, Resolved:  No known infectious disease history. Post-operative SIRS likely a response from multiple procedures (VA ECMO cannulation and decannulation) and transplant surgery. Remains afebrile and without leukocytosis. Negative recipient bronch culture from time of transplant.  Donor culture at OSH grew MRSA, Strep pneumoniae, Moraxella catarrhalis, and Haemophilus species. Donor culture at [facility] grew MRSA. S/p 2-week course of vancomycin and pip/tazo.       RADHA, Resolved:  Volume overload, Resolved:  Suspect secondary to meds and diuresis. Serum Cr+ and BLE edema now back to WNL. LE edema likely 2/2 low albumin state. Improved with diuretics.   - Bumex 1 mg PO daily -- consider discontinuing over the next ~ 1-2 days if patient remains euvolemic and stable CXR without recurrent pleural effusion   - Trend BMP daily  - Avoid nephrotoxic agents as possible    LUE DVT, Provoked:  Noted on UE ultrasound on 3/7. Warfarin held 3/21-3/22 for IR procedure.  - Continue enoxaparin BID as bridge to therapeutic INR. Discontinue once INR > 2.0.  - Warfarin, pharm to dose.   - INR goal 2-3. Will need 3-months of anticoagulation      Acute anemia:  Hgb with slow drift to 7's. Received transfusion 3/8 and 3/15 with appropriate response. No evidence of bleeding. Likely 2/2 multiple lab draws, bone marrow suppressive medications. Stable hgb low 9s.  - Trend hgb daily    Hypomagnesemia:   Likely r/t CNI.  - Mg oxide 400 mg daily    Diarrhea,  "Resolved:  Large bowel loops on imaging:  Painful defecation, Resolved:  Likely 2/2 antibiotics, immunosuppression agents and/or TFs. Pain while defecation could be anal fissure versus hemorrhoid, now resolved. C diff negative.   - Abd XR on 3/22 revealing mod-lg amt of stool in colon and distal small bowel with gas bubbles in the proximal portion of small intestine. Repeat Abd XR 3/23 with prominent gas t/o colon which is not frankly distended -- personally reviewed by me.   - Bowel regimen: decrease Miralax BID --> daily, Senakot daily --> daily prn.   - Desitin cream daily BID prn  - Imodium prn         FEN: Regular diet, electrolyte protocol  PPX: Warfarin, enoxaparin, PPI   CODE: Full  DISPO: Inpatient cares. ARU pending bed availability       Patient discussed with Dr. Macias.    Vibha Albert, ADRIÁN, CNP  Inpatient Nurse Practitioner  [employer]  Pager [phone]        Subjective & Interval History:     Last 24 hour vital signs, labs and care team notes reviewed.    No acute events overnight. Remains afebrile, hemodynamically stable. Sating well on RA. Thoracentesis yesterday ~ 1.5 liters removed. Patient reports breathing \"much better\" this morning. Abdominal and R-pleural discomfort now resolved post-thoracentesis. Continue to be motivated to work with PT/OT.            Review of Systems:     C: no fever, no chills, no change in weight, improving appetite  INTEGUMENTARY/SKIN: no rash or obvious new lesions  ENT/MOUTH: no sore throat, no sinus pain, no nasal drainage  RESP: occasional cough with rare sputum production, denies SOB  CV: no chest pain, no palpitations, no peripheral edema, no orthopnea  GI: no nausea, no vomiting, no change in stools, no bloating, no abdominal discomfort  : no dysuria  MUSCULOSKELETAL: no myalgias, no arthralgias  ENDOCRINE: blood sugars with adequate control  NEURO: no headache, no numbness or tingling  PSYCHIATRIC: mood stable          Medications:     Active Medications:    " warfarin  7.5 mg Oral ONCE at 18:00     sodium chloride (PF)  3 mL Intracatheter Q8H     polyethylene glycol  17 g Oral BID     senna-docusate  2 tablet Oral BID     enoxaparin  1 mg/kg (Dosing Weight) Subcutaneous Q12H     tacrolimus  5.5 mg Oral BID IS     predniSONE  12.5 mg Oral Daily     predniSONE  10 mg Oral QPM     multivitamin, therapeutic with minerals  1 tablet Oral Daily     calcium-vitamin D  1 tablet Oral BID w/meals     magnesium oxide  400 mg Oral Daily     pantoprazole  40 mg Oral QAM     valGANciclovir  900 mg Oral Daily     bumetanide  1 mg Oral Daily     sulfamethoxazole-trimethoprim  1 tablet Oral or NG Tube Daily     metoprolol tartrate  25 mg Oral BID     QUEtiapine  50 mg Oral At Bedtime     levalbuterol  2 puff Inhalation Q4H JULES     nystatin  1,000,000 Units Swish & Swallow 4x Daily     mycophenolate  1,500 mg Oral BID IS     Active PRN Medications:  sodium chloride (PF), - MEDICATION INSTRUCTIONS -, polyethylene glycol, Warfarin Therapy Reminder, potassium phosphate (KPHOS) in D5W IV, potassium phosphate (KPHOS) in D5W IV, potassium phosphate (KPHOS) in D5W IV, potassium phosphate (KPHOS) in D5W IV, potassium phosphate (KPHOS) in D5W IV, naloxone, acetaminophen, ondansetron **OR** ondansetron, glucose **OR** dextrose **OR** glucagon, potassium chloride, potassium chloride, potassium chloride, potassium chloride with lidocaine, potassium chloride, magnesium sulfate, magnesium sulfate         Physical Exam:     Constitutional: Awake, alert, in no apparent distress.   HEENT: Eyes with pink conjunctivae, anicteric. Oral mucosa moist without lesions. Neck supple without lymphadenopathy.   PULM: Good air flow bilaterally. Clear lung sounds throughout. Non-labored breathing on RA.  CV: Normal S1 and S2. RRR.  No murmur, gallop, or rub.  No peripheral edema.   ABD: Good BS, soft, nontender, not distended.  No guarding.   MSK: Moves all extremities.  No apparent muscle wasting.   NEURO: Alert and  "oriented x 4, conversant.   SKIN: Warm, dry.  No rash on limited exam.   PSYCH: Mood stable.?     Lines, Drains, and Devices:  Peripheral IV 03/10/18 Right Upper forearm (Active)   Site Assessment [facility] 3/20/2018 12:15 PM   Line Status Saline locked 3/20/2018 12:15 PM   Phlebitis Scale 0-->no symptoms 3/20/2018 12:15 PM   Infiltration Scale 0 3/20/2018 12:15 PM   Infiltration Site Treatment Method  None 3/16/2018 11:30 AM   Extravasation? No 3/20/2018 12:15 PM   Number of days:10       Peripheral IV 03/15/18 Right;Posterior Lower forearm (Active)   Site Assessment [facility] 3/20/2018 12:15 PM   Line Status Saline locked 3/20/2018 12:15 PM   Phlebitis Scale 0-->no symptoms 3/20/2018 12:15 PM   Infiltration Scale 0 3/20/2018 12:15 PM   Infiltration Site Treatment Method  None 3/16/2018 11:30 AM   Extravasation? No 3/20/2018 12:15 PM   Number of days:5          Data:     All vital signs, laboratory and imaging data for the past 24 hours reviewed.      Vital signs:  Temp: 97  F (36.1  C) Temp src: Axillary BP: 110/76   Heart Rate: 112 Resp: 22 SpO2: 96 % O2 Device: None (Room air) Oxygen Delivery: 3 LPM Height: 160 cm (5' 3\") Weight: 55.7 kg (122 lb 14.4 oz)    Pain: # Pain Assessment:   Current Pain Score 3/23/2018 3/23/2018 3/22/2018   Patient currently in pain? denies denies denies   Pain location - - -   Pain descriptors - - -   CPOT pain score - - -   Kecia rivas pain level was assessed and she currently denies pain.      Weight trend:   Vitals:    03/20/18 0330 03/21/18 0451 03/22/18 0131   Weight: 57.4 kg (126 lb 8.7 oz) 56.5 kg (124 lb 8 oz) 55.7 kg (122 lb 14.4 oz)        I/O:   Intake/Output Summary (Last 24 hours) at 03/20/18 1503  Last data filed at 03/20/18 1000   Gross per 24 hour   Intake              480 ml   Output              800 ml   Net             -320 ml       Labs    CMP:     Recent Labs  Lab 03/23/18  0605 03/22/18  0630 03/21/18  2333 03/21/18  0529 03/20/18  0456    137  --  139 139   POTASSIUM 5.2 " 4.8  --  4.9 5.0   CHLORIDE 105 106  --  107 106   CO2 24 21  --  22 23   ANIONGAP 8 11  --  10 10   * 137*  --  108* 121*   BUN 30 32*  --  36* 34*   CR 1.00 1.03  --  1.18* 0.94   GFRESTIMATED 58* 56*  --  48* 62   GFRESTBLACK 70 67  --  57* 75   CHELSEY 8.5 8.4*  --  8.9 8.7   MAG 1.8 2.0 2.4* 1.6 1.7   PHOS 3.1 3.0  --  3.3 3.7   PROTTOTAL  --   --   --  5.6*  --    ALBUMIN 2.4* 2.3*  --  2.5* 2.5*   ALT 28 24  --  26 25       CBC:     Recent Labs  Lab 03/23/18  0605 03/22/18  0630 03/21/18  0529 03/20/18  0456   WBC 5.0 5.1 4.8 5.4   RBC 3.15* 2.99* 2.85* 3.07*   HGB 9.9* 9.3* 9.0* 9.6*   HCT 32.3* 30.5* 29.3* 31.3*   * 102* 103* 102*   MCH 31.4 31.1 31.6 31.3   MCHC 30.7* 30.5* 30.7* 30.7*   RDW 20.6* 20.8* 21.3* 21.4*    345 298 250       INR:     Recent Labs  Lab 03/23/18  0605 03/22/18  0630 03/21/18  1146 03/21/18  0529   INR 1.61* 1.25* 1.85* 2.67*       Glucose:     Recent Labs  Lab 03/23/18  0605 03/22/18  0630 03/21/18  0529 03/20/18  0456 03/19/18  0450 03/18/18  2326 03/18/18  1750 03/18/18  1316 03/18/18  0906 03/18/18  0534 03/18/18  0042 03/17/18  2114   * 137* 108* 121* 115*  --   --   --   --  137*  --   --    BGM  --   --   --   --   --  163* 147* 132* 158*  --  139* 151*       Blood Gas: No lab results found in last 7 days.    Culture Data:     Recent Labs  Lab 03/22/18  1049   CULT Culture negative monitoring continues  PENDING       Virology Data:   Lab Results   Component Value Date    FLUAH1 Negative 02/27/2018    FLUAH3 Negative 02/27/2018    KA9396 Negative 02/27/2018    IFLUB Negative 02/27/2018    RSVA Negative 02/27/2018    RSVB Negative 02/27/2018    PIV1 Negative 02/27/2018    PIV2 Negative 02/27/2018    PIV3 Negative 02/27/2018    HMPV Negative 02/27/2018    HRVS Negative 02/27/2018    ADVBE Negative 02/27/2018    ADVC Negative 02/27/2018       Historical CMV results (last 3 of prior testing):  Lab Results   Component Value Date    CMVQNT Canceled, Test  credited (A) 03/01/2018    CMVQNT Canceled, Test credited (A) 03/01/2018     Lab Results   Component Value Date    CMVLOG Canceled, Test credited 03/01/2018    CMVLOG Canceled, Test credited 03/01/2018       Urine Studies    Recent Labs   Lab Test  03/04/18   1425   URINEPH  5.5   NITRITE  Negative   LEUKEST  Negative   WBCU  5

## 2018-03-23 NOTE — PLAN OF CARE
Problem: Patient Care Overview  Goal: Plan of Care/Patient Progress Review  6C:  OT/PA:  Discharge Planner OT   Patient plan for discharge: ARU, however thinks that if she stays in hospital over the weekend that she could discharge to [facility] instead of ARU  Current status: Pt ambulated in hallways ~750 feet with no AD while holding 's hand with SBA.  Pt tolerated well, VSS.  Pt and pt's  educated regarding DME and where to purchase, along with energy conservation and ways pt's  can help pt upon discharge.  Barriers to return to prior living situation: medical status  Recommendations for discharge:  ARU vs. [facility] pending continued progress in therapy  Rationale for recommendations: Pt progressing well.  Pt's  is supportive and able to assist as needed.       Entered by: Kecia Chin 03/23/2018 4:09 PM

## 2018-03-23 NOTE — PLAN OF CARE
Problem: Patient Care Overview  Goal: Plan of Care/Patient Progress Review  Pt alert and oriented x4. VSS on room air. Thora done today 1470ml removed from the right side. Pt reports significant improvement. Right groin suture removed. Plan for potential discharge to ARU tomorrow. Will continue to monitor per plan of care.

## 2018-03-23 NOTE — PROGRESS NOTES
Patient s discharge needs assessed and discharge planning has been conducted with the multidisciplinary transplant care team including physicians, pharmacy, social work and transplant coordinator.

## 2018-03-23 NOTE — PLAN OF CARE
"Problem: Patient Care Overview  Goal: Plan of Care/Patient Progress Review  Outcome: No Change  /87 (BP Location: Right arm)  Pulse 106  Temp 97.8  F (36.6  C) (Oral)  Resp 22  Ht 1.6 m (5' 3\")  Wt 55.7 kg (122 lb 14.4 oz)  SpO2 97%  BMI 21.77 kg/m2  Neuro: A&Ox4.   Cardiac: sinus Tach. VSS.                 Respiratory: Sating at 96% on RA.  GI/: Adequate urine output. BM X1  Diet/appetite: Tolerating regular diet.  Activity:  Assist of 1, up to commode  Pain: At acceptable level on current regimen.   Skin:no new deficits noted.  LDA's: 1 piv      Plan:Pt will be D/c'd to ARU Continue with POC. Notify primary team with changes    Problem: Chronic Respiratory Difficulty Comorbidity  Goal: Chronic Respiratory Difficulty  Patient comorbidity will be monitored for signs and symptoms of Respiratory Difficulty (Chronic) condition.  Problems will be absent, minimized or managed by discharge/transition of care.   Outcome: No Change  Sating fine one RA. Tachypnea with exertion.       "

## 2018-03-24 ENCOUNTER — HOSPITAL ENCOUNTER (INPATIENT)
Facility: CLINIC | Age: 58
LOS: 4 days | Discharge: HOME | DRG: 949 | End: 2018-03-28
Attending: PHYSICAL MEDICINE & REHABILITATION | Admitting: PHYSICAL MEDICINE & REHABILITATION
Payer: COMMERCIAL

## 2018-03-24 VITALS
TEMPERATURE: 97.7 F | HEART RATE: 106 BPM | RESPIRATION RATE: 20 BRPM | SYSTOLIC BLOOD PRESSURE: 114 MMHG | BODY MASS INDEX: 20.84 KG/M2 | WEIGHT: 117.6 LBS | OXYGEN SATURATION: 97 % | HEIGHT: 63 IN | DIASTOLIC BLOOD PRESSURE: 78 MMHG

## 2018-03-24 DIAGNOSIS — Z94.2 LUNG TRANSPLANT RECIPIENT (H): Primary | ICD-10-CM

## 2018-03-24 DIAGNOSIS — Z76.82 ORGAN TRANSPLANT CANDIDATE: ICD-10-CM

## 2018-03-24 DIAGNOSIS — I82.622: ICD-10-CM

## 2018-03-24 DIAGNOSIS — Z94.2 S/P LUNG TRANSPLANT (H): ICD-10-CM

## 2018-03-24 DIAGNOSIS — K21.9 GASTROESOPHAGEAL REFLUX DISEASE WITHOUT ESOPHAGITIS: ICD-10-CM

## 2018-03-24 DIAGNOSIS — E83.42 HYPOMAGNESEMIA: ICD-10-CM

## 2018-03-24 DIAGNOSIS — J96.21 ACUTE ON CHRONIC RESPIRATORY FAILURE WITH HYPOXIA (H): ICD-10-CM

## 2018-03-24 DIAGNOSIS — J84.9 LUNG DISEASE, INTERSTITIAL (H): ICD-10-CM

## 2018-03-24 DIAGNOSIS — I48.0 PAROXYSMAL ATRIAL FIBRILLATION (H): ICD-10-CM

## 2018-03-24 DIAGNOSIS — I95.9 HYPOTENSION, UNSPECIFIED HYPOTENSION TYPE: ICD-10-CM

## 2018-03-24 DIAGNOSIS — R93.89 ABNORMAL CHEST CT: ICD-10-CM

## 2018-03-24 DIAGNOSIS — K59.09 OTHER CONSTIPATION: ICD-10-CM

## 2018-03-24 DIAGNOSIS — J84.9 ILD (INTERSTITIAL LUNG DISEASE) (H): ICD-10-CM

## 2018-03-24 LAB
ACID FAST STN SPEC QL: NORMAL
ACID FAST STN SPEC QL: NORMAL
ALBUMIN SERPL-MCNC: 2.2 G/DL (ref 3.4–5)
ALT SERPL W P-5'-P-CCNC: 29 U/L (ref 0–50)
ANION GAP SERPL CALCULATED.3IONS-SCNC: 10 MMOL/L (ref 3–14)
BUN SERPL-MCNC: 34 MG/DL (ref 7–30)
C DIFF TOX B STL QL: NEGATIVE
CALCIUM SERPL-MCNC: 8.6 MG/DL (ref 8.5–10.1)
CHLORIDE SERPL-SCNC: 103 MMOL/L (ref 94–109)
CO2 SERPL-SCNC: 24 MMOL/L (ref 20–32)
CREAT SERPL-MCNC: 1.15 MG/DL (ref 0.52–1.04)
ERYTHROCYTE [DISTWIDTH] IN BLOOD BY AUTOMATED COUNT: 20.3 % (ref 10–15)
GFR SERPL CREATININE-BSD FRML MDRD: 49 ML/MIN/1.7M2
GLUCOSE SERPL-MCNC: 131 MG/DL (ref 70–99)
HCT VFR BLD AUTO: 26.5 % (ref 35–47)
HGB BLD-MCNC: 8.2 G/DL (ref 11.7–15.7)
INR PPP: 2.23 (ref 0.86–1.14)
LACTATE BLD-SCNC: 1.3 MMOL/L (ref 0.4–1.9)
MAGNESIUM SERPL-MCNC: 1.9 MG/DL (ref 1.6–2.3)
MCH RBC QN AUTO: 31.4 PG (ref 26.5–33)
MCHC RBC AUTO-ENTMCNC: 30.9 G/DL (ref 31.5–36.5)
MCV RBC AUTO: 102 FL (ref 78–100)
PHOSPHATE SERPL-MCNC: 3.6 MG/DL (ref 2.5–4.5)
PLATELET # BLD AUTO: 400 10E9/L (ref 150–450)
POTASSIUM SERPL-SCNC: 5.3 MMOL/L (ref 3.4–5.3)
RBC # BLD AUTO: 2.61 10E12/L (ref 3.8–5.2)
SODIUM SERPL-SCNC: 137 MMOL/L (ref 133–144)
SPECIMEN SOURCE: NORMAL
SPECIMEN SOURCE: NORMAL
WBC # BLD AUTO: 4.1 10E9/L (ref 4–11)

## 2018-03-24 PROCEDURE — 25000132 ZZH RX MED GY IP 250 OP 250 PS 637: Performed by: INTERNAL MEDICINE

## 2018-03-24 PROCEDURE — 25000132 ZZH RX MED GY IP 250 OP 250 PS 637: Performed by: PHYSICAL MEDICINE & REHABILITATION

## 2018-03-24 PROCEDURE — 25000125 ZZHC RX 250: Performed by: PHYSICAL MEDICINE & REHABILITATION

## 2018-03-24 PROCEDURE — 36415 COLL VENOUS BLD VENIPUNCTURE: CPT | Performed by: INTERNAL MEDICINE

## 2018-03-24 PROCEDURE — 80069 RENAL FUNCTION PANEL: CPT | Performed by: INTERNAL MEDICINE

## 2018-03-24 PROCEDURE — 83605 ASSAY OF LACTIC ACID: CPT | Performed by: PHYSICAL MEDICINE & REHABILITATION

## 2018-03-24 PROCEDURE — 36415 COLL VENOUS BLD VENIPUNCTURE: CPT | Performed by: PHYSICAL MEDICINE & REHABILITATION

## 2018-03-24 PROCEDURE — 12800006 ZZH R&B REHAB

## 2018-03-24 PROCEDURE — 25000131 ZZH RX MED GY IP 250 OP 636 PS 637: Performed by: SURGERY

## 2018-03-24 PROCEDURE — 84460 ALANINE AMINO (ALT) (SGPT): CPT | Performed by: INTERNAL MEDICINE

## 2018-03-24 PROCEDURE — 25000131 ZZH RX MED GY IP 250 OP 636 PS 637: Performed by: THORACIC SURGERY (CARDIOTHORACIC VASCULAR SURGERY)

## 2018-03-24 PROCEDURE — 83735 ASSAY OF MAGNESIUM: CPT | Performed by: INTERNAL MEDICINE

## 2018-03-24 PROCEDURE — 25000132 ZZH RX MED GY IP 250 OP 250 PS 637: Performed by: SURGERY

## 2018-03-24 PROCEDURE — 25000132 ZZH RX MED GY IP 250 OP 250 PS 637: Performed by: THORACIC SURGERY (CARDIOTHORACIC VASCULAR SURGERY)

## 2018-03-24 PROCEDURE — 25000132 ZZH RX MED GY IP 250 OP 250 PS 637: Performed by: ANESTHESIOLOGY

## 2018-03-24 PROCEDURE — 25000132 ZZH RX MED GY IP 250 OP 250 PS 637: Performed by: NURSE PRACTITIONER

## 2018-03-24 PROCEDURE — 25000131 ZZH RX MED GY IP 250 OP 636 PS 637: Performed by: INTERNAL MEDICINE

## 2018-03-24 PROCEDURE — 25000125 ZZHC RX 250: Performed by: SURGERY

## 2018-03-24 PROCEDURE — 85027 COMPLETE CBC AUTOMATED: CPT | Performed by: INTERNAL MEDICINE

## 2018-03-24 PROCEDURE — 87493 C DIFF AMPLIFIED PROBE: CPT | Performed by: INTERNAL MEDICINE

## 2018-03-24 PROCEDURE — 85610 PROTHROMBIN TIME: CPT | Performed by: INTERNAL MEDICINE

## 2018-03-24 RX ORDER — QUETIAPINE FUMARATE 50 MG/1
50 TABLET, FILM COATED ORAL AT BEDTIME
Status: DISCONTINUED | OUTPATIENT
Start: 2018-03-24 | End: 2018-03-26

## 2018-03-24 RX ORDER — POLYETHYLENE GLYCOL 3350 17 G/17G
17 POWDER, FOR SOLUTION ORAL 2 TIMES DAILY
Status: DISCONTINUED | OUTPATIENT
Start: 2018-03-24 | End: 2018-03-28 | Stop reason: HOSPADM

## 2018-03-24 RX ORDER — PREDNISONE 10 MG/1
10 TABLET ORAL AT BEDTIME
Status: DISCONTINUED | OUTPATIENT
Start: 2018-03-24 | End: 2018-03-28 | Stop reason: HOSPADM

## 2018-03-24 RX ORDER — ONDANSETRON 2 MG/ML
4 INJECTION INTRAMUSCULAR; INTRAVENOUS EVERY 6 HOURS PRN
Status: DISCONTINUED | OUTPATIENT
Start: 2018-03-24 | End: 2018-03-27

## 2018-03-24 RX ORDER — MULTIPLE VITAMINS W/ MINERALS TAB 9MG-400MCG
1 TAB ORAL DAILY
Status: DISCONTINUED | OUTPATIENT
Start: 2018-03-25 | End: 2018-03-28 | Stop reason: HOSPADM

## 2018-03-24 RX ORDER — MAGNESIUM OXIDE 400 MG/1
400 TABLET ORAL DAILY
Status: DISCONTINUED | OUTPATIENT
Start: 2018-03-25 | End: 2018-03-28 | Stop reason: HOSPADM

## 2018-03-24 RX ORDER — METOPROLOL TARTRATE 25 MG/1
25 TABLET, FILM COATED ORAL 2 TIMES DAILY
Status: DISCONTINUED | OUTPATIENT
Start: 2018-03-24 | End: 2018-03-28 | Stop reason: HOSPADM

## 2018-03-24 RX ORDER — SULFAMETHOXAZOLE AND TRIMETHOPRIM 400; 80 MG/1; MG/1
1 TABLET ORAL DAILY
Status: DISCONTINUED | OUTPATIENT
Start: 2018-03-25 | End: 2018-03-28 | Stop reason: HOSPADM

## 2018-03-24 RX ORDER — NYSTATIN 100000/ML
100000 SUSPENSION, ORAL (FINAL DOSE FORM) ORAL 4 TIMES DAILY
Status: DISCONTINUED | OUTPATIENT
Start: 2018-03-24 | End: 2018-03-25

## 2018-03-24 RX ORDER — VALGANCICLOVIR 450 MG/1
900 TABLET, FILM COATED ORAL DAILY
Status: DISCONTINUED | OUTPATIENT
Start: 2018-03-25 | End: 2018-03-26

## 2018-03-24 RX ORDER — CALCIUM CARBONATE 500(1250)
1 TABLET ORAL 2 TIMES DAILY
Status: DISCONTINUED | OUTPATIENT
Start: 2018-03-24 | End: 2018-03-24

## 2018-03-24 RX ORDER — PANTOPRAZOLE SODIUM 40 MG/1
40 TABLET, DELAYED RELEASE ORAL EVERY MORNING
Status: DISCONTINUED | OUTPATIENT
Start: 2018-03-25 | End: 2018-03-28 | Stop reason: HOSPADM

## 2018-03-24 RX ORDER — LEVALBUTEROL TARTRATE 45 UG/1
2 AEROSOL, METERED ORAL EVERY 4 HOURS
Status: DISCONTINUED | OUTPATIENT
Start: 2018-03-24 | End: 2018-03-25

## 2018-03-24 RX ORDER — WARFARIN SODIUM 5 MG/1
5 TABLET ORAL
Status: COMPLETED | OUTPATIENT
Start: 2018-03-24 | End: 2018-03-24

## 2018-03-24 RX ORDER — MYCOPHENOLATE MOFETIL 250 MG/1
1500 CAPSULE ORAL 2 TIMES DAILY
Status: DISCONTINUED | OUTPATIENT
Start: 2018-03-24 | End: 2018-03-28 | Stop reason: HOSPADM

## 2018-03-24 RX ORDER — AMOXICILLIN 250 MG
2 CAPSULE ORAL AT BEDTIME
Status: DISCONTINUED | OUTPATIENT
Start: 2018-03-24 | End: 2018-03-28 | Stop reason: HOSPADM

## 2018-03-24 RX ADMIN — MAGNESIUM OXIDE TAB 400 MG (241.3 MG ELEMENTAL MG) 400 MG: 400 (241.3 MG) TAB at 11:50

## 2018-03-24 RX ADMIN — LEVALBUTEROL TARTRATE 2 PUFF: 45 AEROSOL, METERED ORAL at 11:55

## 2018-03-24 RX ADMIN — VALGANCICLOVIR 900 MG: 450 TABLET, FILM COATED ORAL at 08:02

## 2018-03-24 RX ADMIN — PANTOPRAZOLE SODIUM 40 MG: 40 TABLET, DELAYED RELEASE ORAL at 08:03

## 2018-03-24 RX ADMIN — POLYETHYLENE GLYCOL 3350 17 G: 17 POWDER, FOR SOLUTION ORAL at 08:03

## 2018-03-24 RX ADMIN — SULFAMETHOXAZOLE AND TRIMETHOPRIM 1 TABLET: 400; 80 TABLET ORAL at 08:02

## 2018-03-24 RX ADMIN — LEVALBUTEROL TARTRATE 2 PUFF: 45 AEROSOL, METERED ORAL at 21:51

## 2018-03-24 RX ADMIN — NYSTATIN 1000000 UNITS: 500000 SUSPENSION ORAL at 08:01

## 2018-03-24 RX ADMIN — Medication 1 TABLET: at 08:02

## 2018-03-24 RX ADMIN — NYSTATIN 1000000 UNITS: 500000 SUSPENSION ORAL at 11:50

## 2018-03-24 RX ADMIN — METOPROLOL TARTRATE 25 MG: 25 TABLET, FILM COATED ORAL at 11:50

## 2018-03-24 RX ADMIN — QUETIAPINE 50 MG: 50 TABLET ORAL at 21:53

## 2018-03-24 RX ADMIN — WARFARIN SODIUM 5 MG: 5 TABLET ORAL at 18:58

## 2018-03-24 RX ADMIN — TACROLIMUS 5.5 MG: 5 CAPSULE ORAL at 08:02

## 2018-03-24 RX ADMIN — TACROLIMUS 5.5 MG: 5 CAPSULE ORAL at 18:58

## 2018-03-24 RX ADMIN — BUMETANIDE 1 MG: 1 TABLET ORAL at 08:02

## 2018-03-24 RX ADMIN — NYSTATIN 100000 UNITS: 500000 SUSPENSION ORAL at 21:52

## 2018-03-24 RX ADMIN — PREDNISONE 10 MG: 10 TABLET ORAL at 21:53

## 2018-03-24 RX ADMIN — METOPROLOL TARTRATE 25 MG: 25 TABLET, FILM COATED ORAL at 21:53

## 2018-03-24 RX ADMIN — PREDNISONE 12.5 MG: 2.5 TABLET ORAL at 08:02

## 2018-03-24 RX ADMIN — LEVALBUTEROL TARTRATE 2 PUFF: 45 AEROSOL, METERED ORAL at 18:57

## 2018-03-24 RX ADMIN — MULTIPLE VITAMINS W/ MINERALS TAB 1 TABLET: TAB at 08:02

## 2018-03-24 RX ADMIN — LEVALBUTEROL TARTRATE 2 PUFF: 45 AEROSOL, METERED ORAL at 08:03

## 2018-03-24 RX ADMIN — MYCOPHENOLATE MOFETIL 1500 MG: 250 CAPSULE ORAL at 19:01

## 2018-03-24 RX ADMIN — MYCOPHENOLATE MOFETIL 1500 MG: 250 CAPSULE ORAL at 08:03

## 2018-03-24 RX ADMIN — CALCIUM CARBONATE 600 MG (1,500 MG)-VITAMIN D3 400 UNIT TABLET 1 TABLET: at 21:54

## 2018-03-24 RX ADMIN — NYSTATIN 100000 UNITS: 500000 SUSPENSION ORAL at 16:20

## 2018-03-24 ASSESSMENT — ACTIVITIES OF DAILY LIVING (ADL)
ADLS_ACUITY_SCORE: 12
DRESS: 0-->INDEPENDENT
ADLS_ACUITY_SCORE: 11
ADLS_ACUITY_SCORE: 11
AMBULATION: 0-->INDEPENDENT
BATHING: 0-->INDEPENDENT
ADLS_ACUITY_SCORE: 11
SWALLOWING: 0-->SWALLOWS FOODS/LIQUIDS WITHOUT DIFFICULTY
TRANSFERRING: 0-->INDEPENDENT
FALL_HISTORY_WITHIN_LAST_SIX_MONTHS: NO
RETIRED_EATING: 0-->INDEPENDENT
COGNITION: 0 - NO COGNITION ISSUES REPORTED
TOILETING: 0-->INDEPENDENT
RETIRED_COMMUNICATION: 0-->UNDERSTANDS/COMMUNICATES WITHOUT DIFFICULTY

## 2018-03-24 NOTE — PLAN OF CARE
"Problem: Patient Care Overview  Goal: Plan of Care/Patient Progress Review  Outcome: Improving  /85 (BP Location: Right arm)  Pulse 106  Temp 97.8  F (36.6  C) (Oral)  Resp 20  Ht 1.6 m (5' 3\")  Wt 53.3 kg (117 lb 9.6 oz)  SpO2 99%  BMI 20.83 kg/m2    Neuro: A&Ox4.   Cardiac: ST. VSS.   Respiratory: Sating 99% on RA.  GI/: Adequate urine output. BM X1  Diet/appetite: Tolerating regular diet. Eating well.  Activity:  Stand-by assist, up to chair and to the bathroom.  Pain: At acceptable level on current regimen.   Skin: Intact, no new deficits noted.  LDA's: R PIV SL    Plan: Continue with POC. Notify primary team with changes.    Problem: Chronic Respiratory Difficulty Comorbidity  Goal: Chronic Respiratory Difficulty  Patient comorbidity will be monitored for signs and symptoms of Respiratory Difficulty (Chronic) condition.  Problems will be absent, minimized or managed by discharge/transition of care.   Outcome: Improving  Pt has no SOB or respiratory difficulties noted on overnights.      "

## 2018-03-24 NOTE — IP AVS SNAPSHOT
MRN:[id number]                      After Visit Summary   3/24/2018    Kecia Blue    MRN: [id number]           Thank you!     Thank you for choosing [facility] for your care. Our goal is always to provide you with excellent care. Hearing back from our patients is one way we can continue to improve our services. Please take a few minutes to complete the written survey that you may receive in the mail after you visit with us. Thank you!        Patient Information     Date Of Birth          1960        About your hospital stay     You were admitted on:  March 24, 2018 You last received care in the:  [facility]    You were discharged on:  March 28, 2018        Reason for your hospital stay       Admitted to rehab after transplant and initial hospitalization. Very pleased with rapid progress and able to discharge home with outpatient pulmonary rehab.                  Who to Call     For medical emergencies, please call 911.  For non-urgent questions about your medical care, please call your primary care provider or clinic, [phone]          Attending Provider     Provider Specialty    Luigi Fernandes MD Physical Medicine and Rehab       Primary Care Provider Office Phone # Fax #    Rosy Vu [phone]      After Care Instructions     Activity       Your activity upon discharge: may walk independently now. Maybe some support with longer community distances and ability to sit and rest. Pause on driving until given approval by surgery / pulmonology team.            Diet       Follow this diet upon discharge: Regular Diet Adult; Thin Liquids            Wound care and dressings       Instructions to care for your wound at home: surgical incisions healing well, monitor for signs of infection.                  Your next 10 appointments already scheduled     Mar 30, 2018  7:45 AM CDT   Lab with [facility] ([facility])     [address] [address]   [phone]            Mar 30, 2018  8:00 AM CDT   (Arrive by 7:45 AM)   XR CHEST 2 VIEWS with XR1   [facility] Xray ([facility])    [address] [address]   [phone]           Please bring a list of your current medicines to your exam. (Include vitamins, minerals and over-thecounter medicines.) Leave your valuables at home.  Tell your doctor if there is a chance you may be pregnant.  You do not need to do anything special for this exam.            Mar 30, 2018  8:30 AM CDT   PFT VISIT with  PFL C   [facility] Pulmonary Function Testing ([facility])    [address] [place] [address]   [phone]            Mar 30, 2018  9:10 AM CDT   (Arrive by 8:55 AM)   Return Lung Transplant with Ame Chow PA-C   [facility] ([facility])    [address] [place] [address]   [phone]            Apr 03, 2018  7:45 AM CDT   Lab with  LAB   [facility] Lab ([facility])    [address] [place] [address]   [phone]            Apr 03, 2018  8:00 AM CDT   (Arrive by 7:45 AM)   XR CHEST 2 VIEWS with XR1   [facility] Xray ([facility])    [address] [place] [address]   [phone]           Please bring a list of your current medicines to your exam. (Include vitamins, minerals and over-thecounter medicines.) Leave your valuables at home.  Tell your doctor if there is a chance you may be pregnant.  You do not need to do anything special for this exam.            Apr 03, 2018  1:00 PM CDT   PFT VISIT with  PFL D   [facility] Pulmonary Function Testing ([facility])    [address] [place] [address]  [facility] [address]   [phone]            Apr 03, 2018  1:40 PM CDT   (Arrive by 1:25 PM)   Return Lung Transplant with Lopez Macias MD   [facility] Solid Organ Transplant ([facility])    [address] [place] [address]  3rd [facility] [address]   [phone]            Apr 05, 2018  8:30 AM CDT   (Arrive by 8:15 AM)   XR CHEST 2 VIEWS with UCXR1   [facility] Xray ([facility])    [address] [place] [address]   [phone]           Please bring a list of your current medicines to your exam. (Include vitamins, minerals and over-thecounter medicines.) Leave your valuables at home.  Tell your doctor if there is a chance you may be pregnant.  You do not need to do anything special for this exam.            Apr 05, 2018  9:00 AM CDT   (Arrive by 8:45 AM)   Return Lung Transplant with Srinivas Mcelroy MD   [facility] Solid Organ Transplant ([facility])    [address] [place] [id number] [phone]              Additional Services     INR CLINIC REFERRAL       Your provider has referred you to INR Services.    Please be aware that coverage of these services is subject to the terms and limitations of your health insurance plan.  Call member services at your health plan with any benefit or coverage questions.    Indication for Anticoagulation: LUE DVT  If nonstandard INR is desired, indicate goal range and explanation: 2-3  Expected Duration of Therapy: 3 Months            PULMONARY REHAB REFERRAL       *This therapy referral will be filtered to a centralized scheduling office at [facility] and the patient will receive a call to schedule an appointment at a [facility] location most convenient for them.*     If you have not heard from the scheduling office within 2 business days, please call [phone] for all  "locations.    Please be aware that coverage of these services is subject to the terms and limitations of your health insurance plan.  Call member services at your health plan with any benefit or coverage questions.      **Note to Provider:  If you are referring outside of [place] for the therapy appointment, please list the name of the location in the \"special instructions\" above, print the referral and give to the patient to schedule the appointment.                     Further instructions from your care team       Follow up appointments:    -- Follow-up with pulmonology transplant clinic, Ame Chow on as scheduled Friday, March 30th and subsequently with Dr. Macias as scheduled on Tuesday, April 3rd.    -- Primary provider ideally within a few weeks.  You are scheduled to see Dr. Hunt, on Dr. Vu's team, on Tuesday, April 10th at 1:00 pm. If you like to see if there's an earlier appointment, please call [phone].    Address  [facility]                          [address] [place] [address]  Phone   [phone]      Warfarin Instruction     You have started taking a medicine called warfarin. This is a blood-thinning medicine (anticoagulant). It helps prevent and treat blood clots.      Before leaving the hospital, make sure you know how much to take and how long to take it.      You will need regular blood tests to make sure your blood is clotting safely. It is very important to see your doctor for regular blood tests.    Talk to your doctor before taking any new medicine (this includes over-the-counter drugs and herbal products). Many medicines can interact with warfarin. This may cause more bleeding or too much clotting.     Eating a lot of vitamin K--found in green, leafy vegetables--can change the way warfarin works in your body. Do NOT avoid these foods. Instead, try to eat the same amount each day.     Bleeding is the most common side-effect of " "warfarin. You may notice bleeding gums, a bloody nose, bruises and bleeding longer when you cut yourself. See a doctor at once if:   o You cough up blood  o You find blood in your stool (poop)  o You have a deep cut, or a cut that bleeds longer than 10 minutes   o You have a bad cut, hard fall, accident or hit your head (go to urgent care or the emergency room).    For women who can get pregnant: This medicine can harm an unborn baby. Be very careful not to get pregnant while taking this medicine. If you think you might be pregnant, call your doctor right away.    For more information, read \"Guide to Warfarin Therapy,  the booklet you received in the hospital.        Pending Results     No orders found from 3/22/2018 to 3/25/2018.            Statement of Approval     Ordered          03/27/18 4213  I have reviewed and agree with all the recommendations and orders detailed in this document.  EFFECTIVE NOW     Approved and electronically signed by:  Luigi Fernandes MD             Admission Information     Date & Time Provider Department Dept. Phone    3/24/2018 Luigi Fernandes MD  ACUTE REHAB [phone]      Your Vitals Were     Blood Pressure Pulse Temperature Respirations Height Weight    128/80 (BP Location: Right arm) 103 97.5  F (36.4  C) (Oral) 18 1.626 m (5' 4\") 54.4 kg (120 lb)    Pulse Oximetry BMI (Body Mass Index)                98% 20.6 kg/m2          MyChart Information     [facility] gives you secure access to your electronic health record. If you see a primary care provider, you can also send messages to your care team and make appointments. If you have questions, please call your primary care clinic.  If you do not have a primary care provider, please call [phone] and they will assist you.        Care EveryWhere ID     This is your Care EveryWhere ID. This could be used by other organizations to access your [facility] medical records  [id number]        Equal Access to Services     ALBAN VALENCIA " AH: Hadii patricia pearceryleyvadim Lance, waaxda luqadaha, qaybta kamakenzie vaz, morro pennie marceleloina houghkelil bacon killian kelley. So [facility] [phone].    ATENCIÓN: Si habla jordana, tiene a taylor disposición servicios gratuitos de asistencia lingüística. Llame al [phone].    We comply with applicable federal civil rights laws and [place] laws. We do not discriminate on the basis of race, color, national origin, age, disability, sex, sexual orientation, or gender identity.               Review of your medicines      START taking        Dose / Directions    albuterol 108 (90 BASE) MCG/ACT Inhaler   Commonly known as:  PROAIR HFA/PROVENTIL HFA/VENTOLIN HFA   Used for:  Lung transplant recipient (H)        Dose:  2 puff   Inhale 2 puffs into the lungs 4 times daily   Quantity:  1 Inhaler   Refills:  0       warfarin 1 MG tablet   Commonly known as:  COUMADIN   Used for:  Lung transplant recipient (H), Paroxysmal atrial fibrillation (H)        Dose:  4 mg   Take 4 tablets (4 mg) by mouth daily   Quantity:  100 tablet   Refills:  0         CONTINUE these medicines which may have CHANGED, or have new prescriptions. If we are uncertain of the size of tablets/capsules you have at home, strength may be listed as something that might have changed.        Dose / Directions    levalbuterol 45 MCG/ACT Inhaler   Commonly known as:  XOPENEX HFA   This may have changed:  when to take this   Used for:  S/P lung transplant (H)        Dose:  2 puff   Inhale 2 puffs into the lungs 4 times daily   Quantity:  1 Inhaler   Refills:  1       valGANciclovir 450 MG tablet   Commonly known as:  VALCYTE   Indication:  Medication Treatment to Prevent Cytomegalovirus Disease   This may have changed:  how much to take   Used for:  S/P lung transplant (H)        Dose:  450 mg   Take 1 tablet (450 mg) by mouth daily   Quantity:  30 tablet   Refills:  1         CONTINUE these medicines which have NOT CHANGED        Dose / Directions    calcium-vitamin D  600-400 MG-UNIT per tablet   Commonly known as:  CALTRATE   Used for:  S/P lung transplant (H)        Dose:  1 tablet   Take 1 tablet by mouth 2 times daily (with meals)   Quantity:  60 tablet   Refills:  1       magnesium oxide 400 MG tablet   Commonly known as:  MAG-OX   Used for:  Hypomagnesemia        Dose:  400 mg   Take 1 tablet (400 mg) by mouth daily   Quantity:  30 tablet   Refills:  1       metoprolol tartrate 25 MG tablet   Commonly known as:  LOPRESSOR   Used for:  Paroxysmal atrial fibrillation (H)        Dose:  25 mg   Take 1 tablet (25 mg) by mouth 2 times daily   Quantity:  60 tablet   Refills:  1       multivitamin, therapeutic with minerals Tabs tablet   Used for:  Lung transplant recipient (H)        Dose:  1 tablet   Take 1 tablet by mouth daily   Quantity:  30 each   Refills:  0       mycophenolate 250 MG capsule   Commonly known as:  GENERIC EQUIVALENT   Used for:  S/P lung transplant (H)        Dose:  1500 mg   Take 6 capsules (1,500 mg) by mouth 2 times daily   Quantity:  200 capsule   Refills:  1       nystatin 719698 UNIT/ML suspension   Commonly known as:  MYCOSTATIN   Used for:  Lung transplant recipient (H)        Dose:  774273 Units   Take 1 mL (100,000 Units) by mouth 4 times daily 100,000 unit/ ml, take 4- 6 ml by mouth 4 times a day.   Quantity:  280 mL   Refills:  1       order for DME   Used for:  ILD (interstitial lung disease) (H), Hypoxia        Equipment being ordered: Oxygen 5 liters at rest, 15 liters with exertion.  Needs portability.  Please provide 2 10-liter concentrators for in the home   Quantity:  1 Device   Refills:  prn       pantoprazole 40 MG EC tablet   Commonly known as:  PROTONIX   Used for:  Gastroesophageal reflux disease without esophagitis        Dose:  40 mg   Take 1 tablet (40 mg) by mouth every morning   Quantity:  30 tablet   Refills:  1       polyethylene glycol Packet   Commonly known as:  MIRALAX/GLYCOLAX   Used for:  Other constipation        Dose:   17 g   Take 17 g by mouth 2 times daily   Quantity:  60 packet   Refills:  0       predniSONE 5 MG tablet   Commonly known as:  DELTASONE   Used for:  S/P lung transplant (H)        DateAM (mg)PM (mg) 3/22/1812.510 4/26/181010 5/24/18107.5 6/22/187.57.5 7/20/187.55 8/24/1855 9/21/1852.5   Quantity:  300 tablet   Refills:  1       senna-docusate 8.6-50 MG per tablet   Commonly known as:  SENOKOT-S;PERICOLACE   Used for:  Other constipation        Dose:  2 tablet   Take 2 tablets by mouth At Bedtime   Quantity:  100 tablet   Refills:  0       sulfamethoxazole-trimethoprim 400-80 MG per tablet   Commonly known as:  BACTRIM/SEPTRA   Indication:  Preventative Medication Therapy Used Around Surgery   Used for:  Organ transplant candidate, Lung disease, interstitial (H), Abnormal chest CT, Hypotension, unspecified hypotension type, Acute on chronic respiratory failure with hypoxia (H), ILD (interstitial lung disease) (H)        Dose:  1 tablet   1 tablet by Oral or NG Tube route daily   Quantity:  30 tablet   Refills:  0       tacrolimus 0.5 MG capsule   Commonly known as:  GENERIC EQUIVALENT   Used for:  S/P lung transplant (H)        Dose:  5.5 mg   Take 11 capsules (5.5 mg) by mouth 2 times daily   Quantity:  650 capsule   Refills:  1         STOP taking     calcium carbonate 1250 MG tablet   Commonly known as:  OS-CHELSEY 500 mg [place]           ondansetron 2 MG/ML Soln injection   Commonly known as:  ZOFRAN           QUEtiapine 50 MG tablet   Commonly known as:  SEROquel                Where to get your medicines      These medications were sent to [facility] [place], [place] - [address]  [address] [address]     Phone:  [phone]     albuterol 108 (90 BASE) MCG/ACT Inhaler    calcium-vitamin D 600-400 MG-UNIT per tablet    levalbuterol 45 MCG/ACT Inhaler    magnesium oxide 400 MG tablet    metoprolol tartrate 25 MG tablet    multivitamin, therapeutic with minerals Tabs  tablet    nystatin 847624 UNIT/ML suspension    pantoprazole 40 MG EC tablet    polyethylene glycol Packet    senna-docusate 8.6-50 MG per tablet    sulfamethoxazole-trimethoprim 400-80 MG per tablet    valGANciclovir 450 MG tablet    warfarin 1 MG tablet         These medications were sent to [facility] - [place], [place] - [address]  [address], Pending sale to [facility] [address]     Phone:  [phone]     mycophenolate 250 MG capsule    tacrolimus 0.5 MG capsule         Some of these will need a paper prescription and others can be bought over the counter. Ask your nurse if you have questions.     Bring a paper prescription for each of these medications     predniSONE 5 MG tablet                Protect others around you: Learn how to safely use, store and throw away your medicines at www.disposemymeds.org.        ANTIBIOTIC INSTRUCTION     You've Been Prescribed an Antibiotic - Now What?  Your healthcare team thinks that you or your loved one might have an infection. Some infections can be treated with antibiotics, which are powerful, life-saving drugs. Like all medications, antibiotics have side effects and should only be used when necessary. There are some important things you should know about your antibiotic treatment.      Your healthcare team may run tests before you start taking an antibiotic.    Your team may take samples (e.g., from your blood, urine or other areas) to run tests to look for bacteria. These test can be important to determine if you need an antibiotic at all and, if you do, which antibiotic will work best.      Within a few days, your healthcare team might change or even stop your antibiotic.    Your team may start you on an antibiotic while they are working to find out what is making you sick.    Your team might change your antibiotic because test results show that a different antibiotic would be better to treat your infection.    In some cases,  once your team has more information, they learn that you do not need an antibiotic at all. They may find out that you don't have an infection, or that the antibiotic you're taking won't work against your infection. For example, an infection caused by a virus can't be treated with antibiotics. Staying on an antibiotic when you don't need it is more likely to be harmful than helpful.      You may experience side effects from your antibiotic.    Like all medications, antibiotics have side effects. Some of these can be serious.    Let you healthcare team know if you have any known allergies when you are admitted to the hospital.    One significant side effect of nearly all antibiotics is the risk of severe and sometimes deadly diarrhea caused by Clostridium difficile (C. Difficile). This occurs when a person takes antibiotics because some good germs are destroyed. Antibiotic use allows C. diificile to take over, putting patients at high risk for this serious infection.    As a patient or caregiver, it is important to understand your or your loved one's antibiotic treatment. It is especially important for caregivers to speak up when patients can't speak for themselves. Here are some important questions to ask your healthcare team.    What infection is this antibiotic treating and how do you know I have that infection?    What side effects might occur from this antibiotic?    How long will I need to take this antibiotic?    Is it safe to take this antibiotic with other medications or supplements (e.g., vitamins) that I am taking?     Are there any special directions I need to know about taking this antibiotic? For example, should I take it with food?    How will I be monitored to know whether my infection is responding to the antibiotic?    What tests may help to make sure the right antibiotic is prescribed for me?      Information provided by:  www.cdc.gov/getsmart  U.S. Department of Health and Human Services  Centers  for disease Control and Prevention  National Center for Emerging and Zoonotic Infectious Diseases  Division of Healthcare Quality Promotion             Medication List: This is a list of all your medications and when to take them. Check marks below indicate your daily home schedule. Keep this list as a reference.      Medications           Morning Afternoon Evening Bedtime As Needed    albuterol 108 (90 BASE) MCG/ACT Inhaler   Commonly known as:  PROAIR HFA/PROVENTIL HFA/VENTOLIN HFA   Inhale 2 puffs into the lungs 4 times daily            8:00am       12:00pm    4:00pm           8:00pm           calcium-vitamin D 600-400 MG-UNIT per tablet   Commonly known as:  CALTRATE   Take 1 tablet by mouth 2 times daily (with meals)   Last time this was given:  1 tablet on 3/27/2018  9:42 PM                lunch       supper               levalbuterol 45 MCG/ACT Inhaler   Commonly known as:  XOPENEX HFA   Inhale 2 puffs into the lungs 4 times daily   Last time this was given:  2 puffs on 3/28/2018  9:05 AM            8:00am       12:00pm    4:00pm           8:00pm           magnesium oxide 400 MG tablet   Commonly known as:  MAG-OX   Take 1 tablet (400 mg) by mouth daily   Last time this was given:  400 mg on 3/27/2018  1:18 PM                12:00pm                   metoprolol tartrate 25 MG tablet   Commonly known as:  LOPRESSOR   Take 1 tablet (25 mg) by mouth 2 times daily   Last time this was given:  25 mg on 3/28/2018  9:05 AM            8:00am               8:00pm           multivitamin, therapeutic with minerals Tabs tablet   Take 1 tablet by mouth daily   Last time this was given:  1 tablet on 3/27/2018  1:18 PM                12:00pm                   mycophenolate 250 MG capsule   Commonly known as:  GENERIC EQUIVALENT   Take 6 capsules (1,500 mg) by mouth 2 times daily   Last time this was given:  1,500 mg on 3/28/2018  9:01 AM            8:00am               8:00pm           nystatin 857260 UNIT/ML suspension    Commonly known as:  MYCOSTATIN   Take 1 mL (100,000 Units) by mouth 4 times daily 100,000 unit/ ml, take 4- 6 ml by mouth 4 times a day.   Last time this was given:  1,000,000 Units on 3/28/2018  9:05 AM            8:00am       12:00pm    4:00pm           8:00pm           order for DME   Equipment being ordered: Oxygen 5 liters at rest, 15 liters with exertion.  Needs portability.  Please provide 2 10-liter concentrators for in the home                                pantoprazole 40 MG EC tablet   Commonly known as:  PROTONIX   Take 1 tablet (40 mg) by mouth every morning   Last time this was given:  40 mg on 3/28/2018  9:04 AM                                   polyethylene glycol Packet   Commonly known as:  MIRALAX/GLYCOLAX   Take 17 g by mouth 2 times daily   Last time this was given:  17 g on 3/25/2018  9:06 AM                                      predniSONE 5 MG tablet   Commonly known as:  DELTASONE   DateAM (mg)PM (mg) 3/22/1812.510 4/26/181010 5/24/18107.5 6/22/187.57.5 7/20/187.55 8/24/1855 9/21/1852.5   Last time this was given:  12.5 mg on 3/28/2018  9:05 AM                                   senna-docusate 8.6-50 MG per tablet   Commonly known as:  SENOKOT-S;PERICOLACE   Take 2 tablets by mouth At Bedtime                                   sulfamethoxazole-trimethoprim 400-80 MG per tablet   Commonly known as:  BACTRIM/SEPTRA   1 tablet by Oral or NG Tube route daily   Last time this was given:  1 tablet on 3/28/2018  9:04 AM                                   tacrolimus 0.5 MG capsule   Commonly known as:  GENERIC EQUIVALENT   Take 11 capsules (5.5 mg) by mouth 2 times daily   Last time this was given:  5.5 mg on 3/28/2018  9:04 AM            8:00am               8:00pm           valGANciclovir 450 MG tablet   Commonly known as:  VALCYTE   Take 1 tablet (450 mg) by mouth daily   Last time this was given:  450 mg on 3/28/2018  9:04 AM                                   warfarin 1 MG tablet   Commonly known  as:  COUMADIN   Take 4 tablets (4 mg) by mouth daily   Last time this was given:  4 mg on 3/27/2018  5:46 PM                    6:00pm

## 2018-03-24 NOTE — PROGRESS NOTES
DISCHARGE                         No discharge date for patient encounter.  ----------------------------------------------------------------------------  Discharged to: [facility]   Via: [employer] transportation  Accompanied by: Family  Discharge Instructions: in Epic  Prescriptions: at ARU  Follow Up Appointments: arranged; information given  Belongings: All sent with pt and   IV: d/c'd  Telemetry: d/c'd  Pt exhibits understanding of above discharge instructions; all questions answered.    Discharge Paperwork: in Epic for ARU

## 2018-03-24 NOTE — PLAN OF CARE
Problem: Patient Care Overview  Goal: Plan of Care/Patient Progress Review  Physical Therapy Discharge Summary    Reason for therapy discharge:    Discharged to acute rehabilitation facility.    Progress towards therapy goal(s). See goals on Care Plan in [facility] electronic health record for goal details.  Goals partially met.  Barriers to achieving goals:   discharge from facility.    Therapy recommendation(s):    Continued therapy is recommended.  Rationale/Recommendations:  continue PT to address impaired functional mobility..

## 2018-03-24 NOTE — H&P
Admitted:     03/24/2018      HISTORY OF PRESENT ILLNESS:  The patient is a 57-year-old female status post lung transplant transferred from the [facility] to acute rehab unit.      The patient's past medical history is significant for dermatomyositis, on immunosuppression; seronegative rheumatoid arthritis; ILD and pulmonary hypertension who was admitted for emergent lung transplant on 02/21 for increased shortness of breath and hypoxia, required VA ECMO for right heart failure on 02/27 now status post bilateral sequential lung transplant on 03/01, ECMO decannulation on 03/03.  Postop course with hemodynamic instability requiring pressors.  Extubated on 03/07.  Complications included acute kidney injury, anemia, dysphagia, diarrhea, persistent bilateral pleural effusions requiring thoracentesis, DVT and intermittent cough with occasional sputum production.        Prior to admission, the patient was independent with all her ADLs and mobility.  She required supplemental oxygen, did use a power scooter for community mobility.  Lives with her spouse in a farmhouse, 3 stairs to enter.  Was working as a  for the [employer] farm.  Currently presents with stability except for tachycardia.  Is on room air, demonstrating good tolerance of regular texture diet foods and liquids.  Has impaired balance, functional endurance, proximal weakness, dyspnea on exertion affecting her functional independence with mobility and ADLs.        The patient will benefit from acute rehab admission for intense therapies of PT and OT 90 minutes daily to manage her rehab needs.  Also, will need rehab nursing.  The patient will also be followed by hospitalist for medical issues and physical medicine rehabilitation physician for her rehab needs.  Goal is to achieve a level of moderate independence for all mobility and ADLs.  Plan is for her to discharge home with her family with assist as needed.  Estimated length of stay is 5-7  days.      REVIEW OF SYSTEMS:  The patient denies any difficulty with vision, hearing, swallowing.  No joint swelling, rashes or redness.  No headaches, nausea, vomiting.  No difficulty with bowel and bladder.  Does have some shortness of breath on exertion.  Otherwise, no chest pain.  Has good healing surgical sites in her chest.  Has good strength, range of motion and sensation in her upper and lower extremities.  Does have a 10-pound lifting restriction because of her recent lung transplant.  Does have decreased endurance, balance and ADLs secondary to her recent lung transplant.      PHYSICAL EXAMINATION:   GENERAL:  The patient is alert, cooperative, no acute distress.   VITAL SIGNS:  Stable.   HEENT:  Clear.   CHEST:  Regular rate and rhythm.   LUNGS:  Sounds clear.  Wounds healing well.   ABDOMEN:  Soft.  Positive bowel sounds.   SKIN:  Clear, no joint swelling or rashes.   NEUROLOGIC:  Good active and passive range of motion.  Strength in upper and lower extremities is good.  Good coordination on rapid alternating movements.  The patient with intelligible speech.  Oriented to time, place and date.      ASSESSMENT:  As above, 57-year-old female status post lung transplant now with decreased balance, endurance, mobility and activities of daily living with proximal weakness.      PLAN:  As noted above for acute rehab stay 5-7 days, 90 minutes each of PT and OT per day.  The patient is an excellent candidate for rehabilitation.  Will be discharged to her family.  Prognosis is good.         MARC SOLORZANO MD             D: 2018   T: 2018   MT: KADY      Name:     SARAI KILLIAN   MRN:      [id number]        Account:      [id number]   :      1960        Admitted:     2018                   Document: [id number]

## 2018-03-24 NOTE — IP AVS SNAPSHOT
[facility]    [address] [place] [id number]    Phone:  [phone]                                       After Visit Summary   3/24/2018    Kecia Blue    MRN: [id number]           After Visit Summary Signature Page     I have received my discharge instructions, and my questions have been answered. I have discussed any challenges I see with this plan with the nurse or doctor.    ..........................................................................................................................................  Patient/Patient Representative Signature      ..........................................................................................................................................  Patient Representative Print Name and Relationship to Patient    ..................................................               ................................................  Date                                            Time    ..........................................................................................................................................  Reviewed by Signature/Title    ...................................................              ..............................................  Date                                                            Time

## 2018-03-24 NOTE — PHARMACY-ANTICOAGULATION SERVICE
Clinical Pharmacy - Warfarin Dosing Consult     Pharmacy has been consulted to manage this patient s warfarin therapy.  Indication: DVT/ PE Treatment  Therapy Goal: INR 2-3  Warfarin Prior to Admission: No  Warfarin PTA Regimen: Started inpatient   Significant drug interactions: Bactrim     INR   Date Value Ref Range Status   03/24/2018 2.23 (H) 0.86 - 1.14 Final   03/23/2018 1.61 (H) 0.86 - 1.14 Final       Recommend warfarin 5 mg today.  Pharmacy will monitor Kecia VILLAFANA [employer]ricedavid daily and order warfarin doses to achieve specified goal.      Please contact pharmacy as soon as possible if the warfarin needs to be held for a procedure or if the warfarin goals change.      Ynes Quick, PharmD

## 2018-03-24 NOTE — PLAN OF CARE
Problem: Patient Care Overview  Goal: Plan of Care/Patient Progress Review  FOCUS/GOAL  Cognition/Memory/Judgment/Problem solving    ASSESSMENT, INTERVENTIONS AND CONTINUING PLAN FOR GOAL:  Patient arrived to unit at around 1pm, is alert/oriented x4 and has been able to communicate appropriately,  is also here at admission (Ranjan) and is supportive, orientation to room and unit done and most of admission questions covered with patient answering and  present.  Patient is able to ambulate short distances and no AD used, just gait belt and CGA.  Patient denies any pain upon arrival, vital signs are stable, per report has elevated RR at times especially with activity, O2 stable on RA.  Patient's family went to get food from cafeteria and patient was going to eat some of that as had not eaten lunch prior to leaving the [facility], reports low appetite, continue to encourage.  No additional care concerns at this time, continue with admission process.

## 2018-03-24 NOTE — PROGRESS NOTES
"   18 1600   Living Environment   Lives With spouse   Living Arrangements house   Able to Return to Prior Living Arrangements other (see comments)  ([facility] (temporary))   Assessment of Family/Social Support   Marital Status    Who is your support system? ;Children;Parent(s);Sibling(s);Neighbor   Values/Beliefs   (F) Alondra Importance (Uatsdin/alondra and prayer important)     Social Work: Initial Assessment with Discharge Plan    Patient Name: Kecia Blue  : 1960  Age: 57 year old  MRN: [id number]  Completed assessment with: patient  Admitted to ARU: 3/24/18    Presenting Information   Date of SW assessment: 3/24/18  Health Care Directive: yes (copy in EPIC)  Primary Health Care Agent: -Ranjan  Secondary Health Care Agent: 3 daughters  Living Situation: lives with her  in [employer] (can stay on main floor)  Previous Functional Status: independent, but IADL's were done by   DME available: see OT assessment  Patient and family understanding of hospitalization: lung transplant  Cultural/Language/Spiritual Considerations: speaks English, Uatsdin/Religious    Physical Health  Reason for admission: weakness and deconditioning with significant proximal weakness 2/2 lung transplant    Provider Information   Primary Care Physician: Rosy Vu     Mental Health/Chemical Dependency:   Diagnosis: anxiety/sleep - seroquel  Alcohol/Tobacco/Narcotics: none  Support/Services in Place: none (palliative  saw in hospital, signed off)  Services Needed/Recommended: health psychologist and chaplains available  Sexuality/Intimacy: heterosexual    Support System  Marital Status: ; -Josesito \"Ranjan\"-family  business with brother/father  Family support: daughter-Yudelka ([place]), daughter-Evelyn ([place]), daughter-Nasreen ([place]), mother-Migdalia (75 yr old), sister-Caro ([place])  Other support available: neighbors, " friends    Community Resources  Current in home services: none  Previous services: none    Financial/Employment/Education  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  Income Source: wages  Education: vocational technical college  Financial Concerns: none currently  Insurance: [employer] PPO    Discharge Plan   Patient and family discharge goal: stay with  at [facility]  Provided education on discharge plan: home/outpatient therapy  Patient agreeable to discharge plan: to be determined  Provided education and attained signature for Medicare IM and IRF Patient Rights and Privacy Information provided to patient: n/a  Provided patient with [facility] [place] Resources: n/a  Barriers to discharge: none identified    Discharge Recommendations   Disposition: stay with  at [facility]  Transportation Needs:   Name of Transportation Company and Phone: n/a    Additional comments   D:  See H&P for full medical background.  I:  Met with patient to complete assessment and social work consult.  A:  Patient is doing okay.  Supportive family who is involved.  She will transition to [facility] with  for next few months to stay close to hospital for any complications post-transplant.  P:  SW will follow and assist as needed.    Please invite to Care Conference:  Ranjan () [phone] or [phone]      JACQUELINE Martinez   Phone: [phone]  Pager: [phone]

## 2018-03-24 NOTE — PLAN OF CARE
Problem: Patient Care Overview  Goal: Individualization & Mutuality  Outcome: Improving  [id number]: POD #22 bilateral lung tx. VSS on RA. Denies SOB. A&Ox4. Sternal incision CDI, JOSHUA. Fair appetite. Denies pain or nausea. Scheduled medications as ordered. Family at bedside and supportive with cares. Ambulated halls x1. Voiding spont. Complaints of loose stools, colace held at HS. Mag replaced on day shift. Will continue to monitor. Plan to DC to [facility] tomorrow at 1300.

## 2018-03-24 NOTE — PROGRESS NOTES
Post Admission Physician Evaluation:     I have compared Kecia Blue's condition on admission to acute rehabilitation to that outlined in the preadmission screen. History and physical exam performed by me. No significant differences are identified and the patient remains appropriate for an inpatient rehabilitation facility level of care to manage medical issues and address functional impairments due to weakness and deconditioning secondary to lung transplant.     Comorbid medical conditions being managed: RADHA, anemia, dysphagia, diarrhea, persistent bilateral plurall effusions and DVT.    Prior functional level: Pt was independent with all ADLs and mobility. Required supplemental oxygen and use of a power wheelchair in the community.      Present function: impaired balance and functional endurance, proximal weakness and DESHPANDE affecting her functional IND with mobility and ADL's     Anticipated rehabilitation course: achieve level of Mod IND for all mobility and ADL's with plan to discharge home with family and asst as needed.     Will benefit from intensive rehabilitation includin minutes each of PT and OT  Rehabilitation nursing  Close management by physiatry and Hospitalist Team for medical issues.     Prognosis: Medical prognosis is good. Rehab prognosis is also good    Estimated length of stay: 5-7 days      Travis Escamilla MD  Physical Medicine & Rehabilitation

## 2018-03-24 NOTE — DISCHARGE SUMMARY
Mrs. Kecia Blue is a 56 y/o female w/PMHx significant for dermatomyositis (on immunosuppression), seronegative RA, ILD, and pulmonary hypertension who was admitted for emergent lung transplant w/u on 2/21 for increased SOB and hypoxia. She required V-A ECMO for right heart failure on 2/27. She successfully received  BSLT on 3/1 with ECMO decannulation on 3/3. Post-op course has been complicated by hemodynamic instability requiring pressors and iFlolan, transitioned to Revatio, which has now been discontinued.   She was extubated on 3/6 and has remained stable on RA with minimal DESHPANDE. Surveillance bronchoscopy 3/13 showed  intact and patent bilateral anastomoses. Right-sided pleural effusion noted on imaging s/p thoracentesis ~ 1.5 liters removed on 3/22. Pleural cultures NGTD, however neutrophil dominate (anticipate lymphocyte dominate so close to time of transplant). This should be followed closely.   Please continue aggressive pulmonary toilet with acapella/incentive spirometry q 1hr while awake. She continues to make good progress overall and now ready for ARU placement. Please see below for problem based hospital summary.          Infectious disease:  SIRS, Resolved:   No known infectious disease history. Post-operative SIRS likely a response from multiple procedures (VA ECMO cannulation and decannulation) and transplant surgery. She remains afebrile and without leukocytosis. Negative recipient bronch culture from time of transplant.  Donor culture at OSH grew MRSA, Strep pneumoniae, Moraxella catarrhalis, and Haemophilus species. Donor culture at [facility] grew MRSA. She finished 2-week course of vancomycin and pip/tazo.        RADHA, Resolved:  Volume overload, Resolved:  Suspect secondary to meds and diuresis. Serum Cr+ and BLE edema now back to WNL. LE edema likely 2/2 low albumin state. Improved with diuretics.   - Bumex 1 mg PO daily -- We stopped Bumex on dismissal considering stable breathing on room air  and trending up creatinine.   - Avoid nephrotoxic agents as possible     LUE DVT, Provoked:  Noted on UE ultrasound on 3/7. Warfarin held 3/21-3/22 for IR procedure.  - Continue  BID as bridge to therapeutic INR. Discontinued enoxaparin on 3/24 given INR > 2.0.  - Warfarin, pharm to dose.   - INR goal 2-3. Will need 3-months of anticoagulation      Acute anemia:  Hgb with slow drift to 7's. Received transfusion 3/8 and 3/15 with appropriate response. No evidence of bleeding. Likely 2/2 multiple lab draws, bone marrow suppressive medications. Stable hgb low 9s.  - Hgb trended down again on dismissal, but no evidence of bleeding. Trend hgb daily     Hypomagnesemia:   Likely r/t CNI.  - Mg oxide 400 mg daily    Diarrhea, Resolved:  Large bowel loops on imaging:  Painful defecation, Resolved:  Likely 2/2 antibiotics, immunosuppression agents and/or TFs. Pain while defecation could be anal fissure versus hemorrhoid, now resolved. C diff negative.   - Abd XR on 3/22 revealing mod-lg amt of stool in colon and distal small bowel with gas bubbles in the proximal portion of small intestine. Repeat Abd XR 3/23 with prominent gas t/o colon which is not frankly distended -- personally reviewed by me.   - Bowel regimen: decreased Miralax BID --> daily, Senakot daily --> daily prn.   - Desitin cream daily BID prn  - Imodium prn          Please see below for immunosuppression plan.         Continue 3-drug immunosuppression:  - Tacrolimus 5.5mg BID. Target goal 10-15. Tacrolimus drug levels every Mon/Thurs  - MMF 1500 g BID   - Prednisone 12.5mg qAM / 10mg qPM. Steroid taper below:  Date AM (mg) PM (mg)   3/22/18 12.5 10   4/26/18 10 10   5/24/18 10 7.5   6/22/18 7.5 7.5   7/20/18 7.5 5   8/24/18 5 5   9/21/18 5 2.5       Prophylaxis:   PJP: Bactrim  CMV: vangancyclovir  Fungal: Nystatin     Donor Recipient Intervention   CMV status  Pos   Pos  valgancyclovir POD #8-90   EBV status Pos Pos Immune    HSV status N/A  Pos Immune          Exam at the time of transfer on 3/24/2018  Constitutional: Awake, alert, in no apparent distress.   HEENT: Eyes with pink conjunctivae, anicteric. Oral mucosa moist without lesions. Neck supple without lymphadenopathy.   PULM: Good air flow bilaterally. Clear lung sounds throughout. Non-labored breathing on RA.  CV: Normal S1 and S2. RRR.  No murmur, gallop, or rub.  No peripheral edema.   ABD: Good BS, soft, nontender, not distended.  No guarding.   MSK: Moves all extremities.  No apparent muscle wasting.   NEURO: Alert and oriented x 4, conversant.   SKIN: Warm, dry.  No rash on limited exam.   PSYCH: Mood stable.?

## 2018-03-25 ENCOUNTER — HEALTH MAINTENANCE LETTER (OUTPATIENT)
Age: 58
End: 2018-03-25

## 2018-03-25 LAB
INR PPP: 2.98 (ref 0.86–1.14)
LACTATE BLD-SCNC: 1.8 MMOL/L (ref 0.4–1.9)

## 2018-03-25 PROCEDURE — 12800006 ZZH R&B REHAB

## 2018-03-25 PROCEDURE — 97530 THERAPEUTIC ACTIVITIES: CPT | Mod: GO

## 2018-03-25 PROCEDURE — 97110 THERAPEUTIC EXERCISES: CPT | Mod: GP | Performed by: PHYSICAL THERAPIST

## 2018-03-25 PROCEDURE — 25000125 ZZHC RX 250: Performed by: PHYSICAL MEDICINE & REHABILITATION

## 2018-03-25 PROCEDURE — 97116 GAIT TRAINING THERAPY: CPT | Mod: GP | Performed by: PHYSICAL THERAPIST

## 2018-03-25 PROCEDURE — 97535 SELF CARE MNGMENT TRAINING: CPT | Mod: GO

## 2018-03-25 PROCEDURE — 25000125 ZZHC RX 250: Performed by: INTERNAL MEDICINE

## 2018-03-25 PROCEDURE — 40000133 ZZH STATISTIC OT WARD VISIT

## 2018-03-25 PROCEDURE — 97750 PHYSICAL PERFORMANCE TEST: CPT | Mod: GP | Performed by: PHYSICAL THERAPIST

## 2018-03-25 PROCEDURE — 25000132 ZZH RX MED GY IP 250 OP 250 PS 637: Performed by: INTERNAL MEDICINE

## 2018-03-25 PROCEDURE — 36415 COLL VENOUS BLD VENIPUNCTURE: CPT | Performed by: INTERNAL MEDICINE

## 2018-03-25 PROCEDURE — 25000132 ZZH RX MED GY IP 250 OP 250 PS 637: Performed by: PHYSICAL MEDICINE & REHABILITATION

## 2018-03-25 PROCEDURE — 83605 ASSAY OF LACTIC ACID: CPT | Performed by: PHYSICAL MEDICINE & REHABILITATION

## 2018-03-25 PROCEDURE — 25000131 ZZH RX MED GY IP 250 OP 636 PS 637: Performed by: INTERNAL MEDICINE

## 2018-03-25 PROCEDURE — 97530 THERAPEUTIC ACTIVITIES: CPT | Mod: GP | Performed by: PHYSICAL THERAPIST

## 2018-03-25 PROCEDURE — 36415 COLL VENOUS BLD VENIPUNCTURE: CPT | Performed by: PHYSICAL MEDICINE & REHABILITATION

## 2018-03-25 PROCEDURE — 97162 PT EVAL MOD COMPLEX 30 MIN: CPT | Mod: GP | Performed by: PHYSICAL THERAPIST

## 2018-03-25 PROCEDURE — 97166 OT EVAL MOD COMPLEX 45 MIN: CPT | Mod: GO

## 2018-03-25 PROCEDURE — 40000193 ZZH STATISTIC PT WARD VISIT: Performed by: PHYSICAL THERAPIST

## 2018-03-25 PROCEDURE — 85610 PROTHROMBIN TIME: CPT | Performed by: INTERNAL MEDICINE

## 2018-03-25 RX ORDER — LEVALBUTEROL TARTRATE 45 UG/1
2 AEROSOL, METERED ORAL 4 TIMES DAILY
Status: DISCONTINUED | OUTPATIENT
Start: 2018-03-25 | End: 2018-03-28 | Stop reason: HOSPADM

## 2018-03-25 RX ORDER — WARFARIN SODIUM 1 MG/1
1 TABLET ORAL
Status: DISCONTINUED | OUTPATIENT
Start: 2018-03-25 | End: 2018-03-25

## 2018-03-25 RX ORDER — NYSTATIN 100000/ML
1000000 SUSPENSION, ORAL (FINAL DOSE FORM) ORAL 4 TIMES DAILY
Status: DISCONTINUED | OUTPATIENT
Start: 2018-03-25 | End: 2018-03-28 | Stop reason: HOSPADM

## 2018-03-25 RX ORDER — LEVALBUTEROL TARTRATE 45 UG/1
2 AEROSOL, METERED ORAL 4 TIMES DAILY
Status: DISCONTINUED | OUTPATIENT
Start: 2018-03-25 | End: 2018-03-25

## 2018-03-25 RX ADMIN — MULTIPLE VITAMINS W/ MINERALS TAB 1 TABLET: TAB at 12:35

## 2018-03-25 RX ADMIN — LEVALBUTEROL TARTRATE 2 PUFF: 45 AEROSOL, METERED ORAL at 08:58

## 2018-03-25 RX ADMIN — MYCOPHENOLATE MOFETIL 1500 MG: 250 CAPSULE ORAL at 09:06

## 2018-03-25 RX ADMIN — LEVALBUTEROL TARTRATE 2 PUFF: 45 AEROSOL, METERED ORAL at 21:36

## 2018-03-25 RX ADMIN — MYCOPHENOLATE MOFETIL 1500 MG: 250 CAPSULE ORAL at 17:36

## 2018-03-25 RX ADMIN — NYSTATIN 1000000 UNITS: 500000 SUSPENSION ORAL at 21:35

## 2018-03-25 RX ADMIN — METOPROLOL TARTRATE 25 MG: 25 TABLET, FILM COATED ORAL at 10:41

## 2018-03-25 RX ADMIN — SULFAMETHOXAZOLE AND TRIMETHOPRIM 1 TABLET: 400; 80 TABLET ORAL at 09:05

## 2018-03-25 RX ADMIN — NYSTATIN 1000000 UNITS: 500000 SUSPENSION ORAL at 12:35

## 2018-03-25 RX ADMIN — CALCIUM CARBONATE 600 MG (1,500 MG)-VITAMIN D3 400 UNIT TABLET 1 TABLET: at 21:37

## 2018-03-25 RX ADMIN — TACROLIMUS 5.5 MG: 5 CAPSULE ORAL at 17:35

## 2018-03-25 RX ADMIN — TACROLIMUS 5.5 MG: 5 CAPSULE ORAL at 09:05

## 2018-03-25 RX ADMIN — MAGNESIUM OXIDE TAB 400 MG (241.3 MG ELEMENTAL MG) 400 MG: 400 (241.3 MG) TAB at 12:35

## 2018-03-25 RX ADMIN — PREDNISONE 12.5 MG: 2.5 TABLET ORAL at 09:05

## 2018-03-25 RX ADMIN — QUETIAPINE 50 MG: 50 TABLET ORAL at 21:36

## 2018-03-25 RX ADMIN — PANTOPRAZOLE SODIUM 40 MG: 40 TABLET, DELAYED RELEASE ORAL at 09:05

## 2018-03-25 RX ADMIN — PREDNISONE 10 MG: 10 TABLET ORAL at 21:37

## 2018-03-25 RX ADMIN — METOPROLOL TARTRATE 25 MG: 25 TABLET, FILM COATED ORAL at 21:43

## 2018-03-25 RX ADMIN — LEVALBUTEROL TARTRATE 2 PUFF: 45 AEROSOL, METERED ORAL at 12:38

## 2018-03-25 RX ADMIN — NYSTATIN 1000000 UNITS: 500000 SUSPENSION ORAL at 10:41

## 2018-03-25 RX ADMIN — VALGANCICLOVIR 900 MG: 450 TABLET, FILM COATED ORAL at 09:05

## 2018-03-25 RX ADMIN — NYSTATIN 1000000 UNITS: 500000 SUSPENSION ORAL at 17:34

## 2018-03-25 RX ADMIN — Medication 0.5 MG: at 17:35

## 2018-03-25 RX ADMIN — CALCIUM CARBONATE 600 MG (1,500 MG)-VITAMIN D3 400 UNIT TABLET 1 TABLET: at 12:35

## 2018-03-25 RX ADMIN — LEVALBUTEROL TARTRATE 2 PUFF: 45 AEROSOL, METERED ORAL at 17:34

## 2018-03-25 RX ADMIN — POLYETHYLENE GLYCOL 3350 17 G: 17 POWDER, FOR SOLUTION ORAL at 09:06

## 2018-03-25 NOTE — PROGRESS NOTES
Post Admission Physician Evaluation:      I have compared Toby Franks's condition on admission to acute rehabilitation to that outlined in the preadmission screen. History and physical exam performed by me. 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     Comorbid medical conditions being managed: wound infection, pain, DM II, urinary retention and HTN.     Prior functional level: Pt was independent with mobility with a FWW.      Present function: strength deficits, balance deficits, and SB restriction limiting independence with mobility and ADL's.      Anticipated rehabilitation course: achieve level of Mod IND for basic transfers, short distance gait and basic ADL's. Anticipate patient will return home with family, home therapies and home health care.    Will benefit from intensive rehabilitation includin minutes each of PT and OT  Rehabilitation nursing  Close management by Physiatry for medical issues.      Prognosis: Medical prognosis is good. Rehab prognosis is also good     Estimated length of stay: 1-2 weeks.       Travis Escamilla MD  Physical Medicine & Rehabilitation

## 2018-03-25 NOTE — PROGRESS NOTES
"   03/25/18 1109   Quick Adds   Type of Visit Initial PT Evaluation   Living Environment   Lives With spouse   Living Arrangements house   Home Accessibility stairs to enter home   Number of Stairs to Enter Home 3   Number of Stairs Within Home 0   Stair Railings at Home inside, present on left side   Transportation Available car;family or friend will provide   Living Environment Comment PT:  Plan is to d/c to [place] house prior to going home.   Self-Care   Dominant Hand right   Usual Activity Tolerance moderate   Current Activity Tolerance fair   Regular Exercise no   Equipment Currently Used at Home other (see comments)   Activity/Exercise/Self-Care Comment PT:  Pt had limited activity prior to admission due to SOB.   Functional Level Prior   Ambulation 0-->independent   Transferring 0-->independent   Toileting 0-->independent   Fall history within last six months no   Which of the above functional risks had a recent onset or change? toileting;ambulation;transferring   Prior Functional Level Comment PT:  Pt has slowing decreased breathing the past 6 months increasing O2 until 12L/min the last few weeks.  Pt able to amb Ind but limited to 50' of gait before needing 60+mins of rest.   General Information   Onset of Illness/Injury or Date of Surgery - Date 02/21/18   Referring Physician Travis Escamilla MD   Patient/Family Goals Statement \"Get Home\"  Be with family and get out of hospital   Pertinent History of Current Problem (include personal factors and/or comorbidities that impact the POC) PT:  Emergency B lung transplant on 3/1/18   Precautions/Limitations sternal precautions   General Observations PT:  Pt present with spouce who is attentive to all needs.   General Info Comments PT:  Pt able to maintain <93% O2 sat throughout evaluation   Cognitive Status Examination   Orientation orientation to person, place and time   Level of Consciousness alert   Follows Commands and Answers Questions 100% of the " time;able to follow multistep instructions   Personal Safety and Judgment intact   Memory intact   Pain Assessment   Patient Currently in Pain No   Integumentary/Edema   Integumentary/Edema no deficits were identifed   Posture    Posture Not impaired   Range of Motion (ROM)   ROM Comment PT:  MIld decrease in heel cord length. Able to get to neural-1 degree DF.   Strength   Strength Comments PT:  Pt grossly 5/5 Le strength with 4/5 for hamstring , DF and 3+/5 for hip flexion.   ARC Assessment Only   Acute Rehab FIM See FIM scores for Mobility/ADL Assessment   Balance   Balance Comments PT:  Pt demonstrates grossly intact balance both static and dynamic in all positions   Sensory Examination   Sensory Perception no deficits were identified   Coordination   Coordination no deficits were identified   Muscle Tone   Muscle Tone no deficits were identified   Modality Interventions   Planned Modality Interventions TENS;Cryotherapy   Planned Modality Interventions Comments PRN for pain management   General Therapy Interventions   Planned Therapy Interventions balance training;bed mobility training;gait training;joint mobilization;manual therapy;motor coordination training;neuromuscular re-education;ROM;strengthening;stretching;transfer training;risk factor education;home program guidelines;progressive activity/exercise   Clinical Impression   Criteria for Skilled Therapeutic Intervention yes, treatment indicated   PT Diagnosis PT:  Muscle weakness, Abnormality of gait and mobility   Influenced by the following impairments PT:  Decreased strength, decreased fucntional endurance   Functional limitations due to impairments transfers, gait, stairs   Clinical Presentation Evolving/Changing   Clinical Presentation Rationale PT:  Pt will POC with continue to change as she progresses through increased strength and breathign ability.   Clinical Decision Making (Complexity) Moderate complexity   Therapy Frequency` 2 times/day    Predicted Duration of Therapy Intervention (days/wks) 4 days   Anticipated Equipment Needs at Discharge walker  (4ww)   Anticipated Discharge Disposition Other (see comments)  ([facility] w/ OP PT)   Risk & Benefits of therapy have been explained Yes   Patient, Family & other staff in agreement with plan of care Yes   Total Evaluation Time   Total Evaluation Time (Minutes) 35

## 2018-03-25 NOTE — PLAN OF CARE
Problem: Goal/Outcome  Goal: Goal Outcome Summary  Pt's chart reviewed, Pt evaluated and POC established.  Pt presents to ARU following B lung transplant.  Pt demonstrating good stability with amb.  Pt limited by functional endurance and weakness. Pt would benefit from skilled Pt intervention to address decreased gait distance and inability to complete sit<>stand from standard height Ind.    D/C:  To [facility] with OP PT    DME: Pt would like 4WW

## 2018-03-25 NOTE — PLAN OF CARE
Problem: Patient Care Overview  Goal: Plan of Care/Patient Progress Review  OT evaluation completed today. Patient overall performing well with functional mobility and ADLs; upgraded to IND in her room including transfering to the bathroom and ADL routine.   Lilliant will d/c to [place] with assistance from her . Continues with decreased acitvity tolerance for IADLs and decreased LB strength, especially with transfers from lower height surfaces.   Estimated LOS: 4 days   Equipment needs: shower chair and toilet riser

## 2018-03-25 NOTE — PROGRESS NOTES
03/25/18 0900   Quick Adds   Type of Visit Initial Occupational Therapy Evaluation   Living Environment   Lives With spouse   Living Arrangements house   Home Accessibility stairs to enter home;no concerns   Number of Stairs to Enter Home 3   Number of Stairs Within Home 0   Transportation Available car;family or friend will provide   Living Environment Comment Plans to d/c  to [place] apartment with her .                                                                                         Plans to d/c to [place] apartment with her . He will be able to assist 24/7 as needed. Will provide all transportation    Self-Care   Dominant Hand right   Usual Activity Tolerance moderate   Current Activity Tolerance moderate   Regular Exercise no   Equipment Currently Used at Home oxygen;shower chair;wheelchair, power   Activity/Exercise/Self-Care Comment Patient was fully indepenent prior to 1 year ago, steady decline and required assistance for almost all ADls/ IADls for the past 3-6 months. Just power scooter for community mobility.    Functional Level Prior   Ambulation 0-->independent   Transferring 0-->independent   Toileting 0-->independent   Bathing 0-->independent   Dressing 0-->independent   Eating 0-->independent   Communication 0-->understands/communicates without difficulty   Swallowing 0-->swallows foods/liquids without difficulty   Cognition 0 - no cognition issues reported   Fall history within last six months no   Which of the above functional risks had a recent onset or change? ambulation   Prior Functional Level Comment very limited in her functioning for the past 3 months prior to admission; able to walk short hosehold distances; very low activity tolerance    General Information   Onset of Illness/Injury or Date of Surgery - Date 02/21/18   Referring Physician Dr Fernandes    Patient/Family Goals Statement return home ASAP; continue to build strength and endurance    Additional Occupational  Profile Info/Pertinent History of Current Problem The patient's past medical history is significant for dermatomyositis, on immunosuppression; seronegative rheumatoid arthritis; ILD and pulmonary hypertension who was admitted for emergent lung transplant on 02/21 for increased shortness of breath and hypoxia, required [facility] ECMO for right heart failure on 02/27 now status post bilateral sequential lung transplant on 03/01, ECMO decannulation on 03/03.  Postop course with hemodynamic instability requiring pressors.  Extubated on 03/07.  Complications included acute kidney injury, anemia, dysphagia, diarrhea, persistent bilateral pleural effusions requiring thoracentesis, DVT and intermittent cough with occasional sputum production.   Precautions/Limitations fall precautions   Cognitive Status Examination   Orientation orientation to person, place and time   Visual Perception   Visual Perception Wears glasses   Sensory Examination   Sensory Quick Adds No deficits were identified   Pain Assessment   Patient Currently in Pain No   Integumentary/Edema   Integumentary/Edema no deficits were identifed   Strength   Strength Comments Patient with limiations in B shoulders ~ 110 degrees; elbow - hand WFL    Muscle Tone Assessment   Muscle Tone Quick Adds No deficits were identified   Coordination   Upper Extremity Coordination No deficits were identified   ARC Assessment Only   Acute Rehab FIM See FIM scores for Mobility/ADL Assessment   General Therapy Interventions   Planned Therapy Interventions ADL retraining;IADL retraining;balance training;bed mobility training;transfer training;strengthening;home program guidelines;progressive activity/exercise   Clinical Impression   Criteria for Skilled Therapeutic Interventions Met yes, treatment indicated   OT Diagnosis decreased independence with ADLs, IADLs, functional mobility    Influenced by the following impairments decreased acitvity tolerance, decreased strength, post surgical  precautions    Assessment of Occupational Performance 3-5 Performance Deficits   Identified Performance Deficits Juan Jose demonstrates the following deficits; bathing, household management, driving,    Clinical Decision Making (Complexity) Moderate complexity   Therapy Frequency daily   Predicted Duration of Therapy Intervention (days/wks) 3 days    Anticipated Equipment Needs at Discharge toileting equipment;bathing equipment   Anticipated Discharge Disposition Home with Assist   Risks and Benefits of Treatment have been explained. Yes   Patient, Family & other staff in agreement with plan of care Yes   Clinical Impression Comments Patient overall performing well with functional mobility and ADLs. Juan Jose will d/c to [facility] with assistance from her . Continues with decreased acitvity tolerance for IADLs and decreased strength, especially with transfers from lower height surfaces.    Total Evaluation Time   Total Evaluation Time (Minutes) 30  (eval)

## 2018-03-25 NOTE — PLAN OF CARE
"Problem: Patient Care Overview  Goal: Plan of Care/Patient Progress Review  FOCUS/GOAL  Medication management, Medical management, Reinforcement of self-care/ADL and Caregiver training    ASSESSMENT, INTERVENTIONS AND CONTINUING PLAN FOR GOAL:  Patient is alert/oriented x4, has been cooperative with cares on this shift.  This am was notified by NA that patient was having a \"coughing fit\" after breakfast and had a small emesis as well, was \"mostly sputum\" per her report and her  who was present at the time, patient was not in respiratory distress at all and denied any pain or difficulty breathing, also reported that she felt better after \"coughing some of that out.\"  Some fine crackles noted to lower lungs upon auscultation, patient also given scheduled inhaler at this time, continue to monitor.  Patient's  had some questions regarding medications this am, some were clarified by nurse with pharmacy but still had questions regarding the d/c of Bumex and Sildenafil, MD was paged and was reporting this to hospitalist to f/u but hospitalist did not speak with patient or family on this shift, sticky note left. Patient was also approved per therapy late morning safe to be IND in room per assessment, no official order at this time so patient told to continue with call light and staff presence for tranfers, page sent to MD (@1500) for an order, no call back yet at this time, continue to f/u.  No additional care concerns, patients vitals are WNL and denies any pain or difficulty breathing, continue with POC.    ADDENDUM: charge nurse updated regarding medication questions and next nurse aware to continue to f/u with hospitalist.      "

## 2018-03-25 NOTE — PLAN OF CARE
Problem: Patient Care Overview  Goal: Plan of Care/Patient Progress Review    Occupational Therapy Discharge Summary    Reason for therapy discharge:    Discharged to acute rehabilitation facility.    Progress towards therapy goal(s). See goals on Care Plan in [facility] electronic health record for goal details.  Goals partially met.  Barriers to achieving goals:   discharge from facility.    Therapy recommendation(s):    Continued therapy is recommended.  Rationale/Recommendations:  Recommend discharge to ARU to progress activity tolerance, safety and independence with self cares.

## 2018-03-25 NOTE — PLAN OF CARE
FOCUS/GOAL  Bowel management, Bladder management, Pain management, Wound care management, Medical management, Mobility, Skin integrity and Cognition/Memory/Judgment/Problem solving    ASSESSMENT, INTERVENTIONS AND CONTINUING PLAN FOR GOAL:  Pt is A&Ox4, no reports of pain or discomfort. She requires CGA with gait belt only for short disctances. Pt is continent of B/B using toilet in bathroom, she reports that she is having some loose stools (c.diff cx neg) and declined her stool softeners this shift. Pt does have some tachycardia and tachypnea which is her baseline per verbal nursing report. Pt did set off sepsis protocol, Lactic acid result WNL.  is present for most of evening and is involved in cares. Of note, Pt and  are concerned about Pt's current does of antifungal S/S, stating that previously she was receiving 1,000,000 units and now is only receiving 100,000units, sticky note left for MD.

## 2018-03-25 NOTE — PROGRESS NOTES
[facility], [place]    Physical Medicine and Rehabilitation Daily Note     Assessment and Plan:    The patient is a 57-year-old female status post lung transplant transferred from the [facility] to acute rehab unit.     Interval History:    Patient seen on rounds, making gains with therapies. Hospitalist Team following for medical issues. INR 2.98.  Has some difficulties with problem solving and memory. Needs some asst with dressing. Min asst with transfers. Ambulates 750 ft without AD but with hand hold. Continue therapies and discharge planning.          Review of Systems:    8 systems reviewed, see interval history for details.     Medications:    See chart     Physical Exam:      All vitals stable  Constitutional: Alert, NAD    Lungs:  Clear    Cardiovascular:  RRR   Abdomen: Soft    Genitounirinary:  Cont urine    Musculoskeletal: No joint swelling or redness    Neurologic:  No new focal changes      Total time spent >25 min with chart review, patient exam, rehab plan and dictation. >50% time spent face to face and with patient care coordination.     MARC SOLORZANO

## 2018-03-25 NOTE — PLAN OF CARE
Problem: Goal/Outcome  Goal: Goal Outcome Summary  Outcome: Improving  FOCUS/GOAL  Medical management    ASSESSMENT, INTERVENTIONS AND CONTINUING PLAN FOR GOAL:  Patient was admitted yesterday. She is alert and oriented. She called for assistance as needed, able to verbalize her needs. No c/o pain. No SOB. She had a nonproductive cough around 0400, she said that was her usual, asked her if she there was anything to do to help her, she said no. She said coughing was good because she felt like she cleared her chest. She declined her inhalers, she wants to take them QID while awake, left a note for MD.   She ambulated to the bathroom twice with minimal assistance and a gait belt.

## 2018-03-25 NOTE — PLAN OF CARE
Problem: Goal/Outcome  Goal: Goal Outcome Summary  Dynamic Gait Index (DGI):The DGI is a measure of balance during gait that is reliable and valid for the elderly and individuals with Parkinson's disease, MS, vestibular disorders, or s/p stroke. Gait assistive device used: No AD    Patient score: 20/24  Scores ?19/24 indicate an increased risk for falls according to Ethel et al 2000  Minimal Detectable Change = 2.9 in community dwelling elderly according to Keenan et al 2011    Assessment (rationale for performing, application to patient s function & care plan): Pt completed DGI in order to assess stability with amb in open environment in order to determine POC and goals for d/c.  Pt demonstrated amb above falls risk with min veering during head turns, changes of speed and amb.  Pt also demonstrated decreased strength needing railings for amb.  Pt will continue to work on improved strength and functional endurance in preparation for d/c home.    Minutes billed as physical performance test: 15

## 2018-03-26 ENCOUNTER — APPOINTMENT (OUTPATIENT)
Dept: GENERAL RADIOLOGY | Facility: CLINIC | Age: 58
DRG: 949 | End: 2018-03-26
Attending: PHYSICIAN ASSISTANT
Payer: COMMERCIAL

## 2018-03-26 DIAGNOSIS — Z94.2 LUNG REPLACED BY TRANSPLANT (H): Primary | ICD-10-CM

## 2018-03-26 LAB
ANION GAP SERPL CALCULATED.3IONS-SCNC: 8 MMOL/L (ref 3–14)
BUN SERPL-MCNC: 34 MG/DL (ref 7–30)
CALCIUM SERPL-MCNC: 9 MG/DL (ref 8.5–10.1)
CHLORIDE SERPL-SCNC: 104 MMOL/L (ref 94–109)
CO2 SERPL-SCNC: 28 MMOL/L (ref 20–32)
COPATH REPORT: NORMAL
CREAT SERPL-MCNC: 1.18 MG/DL (ref 0.52–1.04)
ERYTHROCYTE [DISTWIDTH] IN BLOOD BY AUTOMATED COUNT: 19.5 % (ref 10–15)
GFR SERPL CREATININE-BSD FRML MDRD: 48 ML/MIN/1.7M2
GLUCOSE SERPL-MCNC: 128 MG/DL (ref 70–99)
HCT VFR BLD AUTO: 28.5 % (ref 35–47)
HGB BLD-MCNC: 8.9 G/DL (ref 11.7–15.7)
INR PPP: 2.8 (ref 0.86–1.14)
MAGNESIUM SERPL-MCNC: 1.8 MG/DL (ref 1.6–2.3)
MCH RBC QN AUTO: 31.2 PG (ref 26.5–33)
MCHC RBC AUTO-ENTMCNC: 31.2 G/DL (ref 31.5–36.5)
MCV RBC AUTO: 100 FL (ref 78–100)
PLATELET # BLD AUTO: 461 10E9/L (ref 150–450)
POTASSIUM SERPL-SCNC: 5.2 MMOL/L (ref 3.4–5.3)
RBC # BLD AUTO: 2.85 10E12/L (ref 3.8–5.2)
SODIUM SERPL-SCNC: 140 MMOL/L (ref 133–144)
TACROLIMUS BLD-MCNC: 14.4 UG/L (ref 5–15)
TME LAST DOSE: NORMAL H
WBC # BLD AUTO: 4.5 10E9/L (ref 4–11)

## 2018-03-26 PROCEDURE — 80048 BASIC METABOLIC PNL TOTAL CA: CPT | Performed by: INTERNAL MEDICINE

## 2018-03-26 PROCEDURE — 97110 THERAPEUTIC EXERCISES: CPT | Mod: GO

## 2018-03-26 PROCEDURE — 83735 ASSAY OF MAGNESIUM: CPT | Performed by: INTERNAL MEDICINE

## 2018-03-26 PROCEDURE — 99222 1ST HOSP IP/OBS MODERATE 55: CPT | Performed by: INTERNAL MEDICINE

## 2018-03-26 PROCEDURE — 97530 THERAPEUTIC ACTIVITIES: CPT | Mod: GO

## 2018-03-26 PROCEDURE — 71046 X-RAY EXAM CHEST 2 VIEWS: CPT

## 2018-03-26 PROCEDURE — 25000132 ZZH RX MED GY IP 250 OP 250 PS 637: Performed by: PHYSICAL MEDICINE & REHABILITATION

## 2018-03-26 PROCEDURE — G0463 HOSPITAL OUTPT CLINIC VISIT: HCPCS

## 2018-03-26 PROCEDURE — 12800006 ZZH R&B REHAB

## 2018-03-26 PROCEDURE — 40000193 ZZH STATISTIC PT WARD VISIT

## 2018-03-26 PROCEDURE — 85027 COMPLETE CBC AUTOMATED: CPT | Performed by: INTERNAL MEDICINE

## 2018-03-26 PROCEDURE — 85610 PROTHROMBIN TIME: CPT | Performed by: INTERNAL MEDICINE

## 2018-03-26 PROCEDURE — 99207 ZZC CONSULT E&M CHANGED TO INITIAL LEVEL: CPT | Performed by: INTERNAL MEDICINE

## 2018-03-26 PROCEDURE — 25000132 ZZH RX MED GY IP 250 OP 250 PS 637: Performed by: INTERNAL MEDICINE

## 2018-03-26 PROCEDURE — 36415 COLL VENOUS BLD VENIPUNCTURE: CPT | Performed by: INTERNAL MEDICINE

## 2018-03-26 PROCEDURE — 97110 THERAPEUTIC EXERCISES: CPT | Mod: GP

## 2018-03-26 PROCEDURE — 97535 SELF CARE MNGMENT TRAINING: CPT | Mod: GO

## 2018-03-26 PROCEDURE — 25000125 ZZHC RX 250: Performed by: INTERNAL MEDICINE

## 2018-03-26 PROCEDURE — 40000133 ZZH STATISTIC OT WARD VISIT

## 2018-03-26 PROCEDURE — 25000131 ZZH RX MED GY IP 250 OP 636 PS 637: Performed by: INTERNAL MEDICINE

## 2018-03-26 PROCEDURE — 80197 ASSAY OF TACROLIMUS: CPT | Performed by: INTERNAL MEDICINE

## 2018-03-26 PROCEDURE — 25000125 ZZHC RX 250: Performed by: PHYSICAL MEDICINE & REHABILITATION

## 2018-03-26 PROCEDURE — 97530 THERAPEUTIC ACTIVITIES: CPT | Mod: GP

## 2018-03-26 RX ORDER — QUETIAPINE FUMARATE 25 MG/1
25-50 TABLET, FILM COATED ORAL
Status: DISCONTINUED | OUTPATIENT
Start: 2018-03-26 | End: 2018-03-27

## 2018-03-26 RX ORDER — VALGANCICLOVIR 450 MG/1
450 TABLET, FILM COATED ORAL DAILY
Status: DISCONTINUED | OUTPATIENT
Start: 2018-03-27 | End: 2018-03-28 | Stop reason: HOSPADM

## 2018-03-26 RX ORDER — WARFARIN SODIUM 1 MG/1
1 TABLET ORAL
Status: COMPLETED | OUTPATIENT
Start: 2018-03-26 | End: 2018-03-26

## 2018-03-26 RX ADMIN — NYSTATIN 1000000 UNITS: 500000 SUSPENSION ORAL at 13:07

## 2018-03-26 RX ADMIN — PREDNISONE 12.5 MG: 2.5 TABLET ORAL at 07:56

## 2018-03-26 RX ADMIN — CALCIUM CARBONATE 600 MG (1,500 MG)-VITAMIN D3 400 UNIT TABLET 1 TABLET: at 13:08

## 2018-03-26 RX ADMIN — TACROLIMUS 5.5 MG: 5 CAPSULE ORAL at 18:21

## 2018-03-26 RX ADMIN — MAGNESIUM OXIDE TAB 400 MG (241.3 MG ELEMENTAL MG) 400 MG: 400 (241.3 MG) TAB at 13:08

## 2018-03-26 RX ADMIN — METOPROLOL TARTRATE 25 MG: 25 TABLET, FILM COATED ORAL at 07:57

## 2018-03-26 RX ADMIN — MYCOPHENOLATE MOFETIL 1500 MG: 250 CAPSULE ORAL at 07:55

## 2018-03-26 RX ADMIN — NYSTATIN 1000000 UNITS: 500000 SUSPENSION ORAL at 16:30

## 2018-03-26 RX ADMIN — VALGANCICLOVIR 900 MG: 450 TABLET, FILM COATED ORAL at 07:56

## 2018-03-26 RX ADMIN — LEVALBUTEROL TARTRATE 2 PUFF: 45 AEROSOL, METERED ORAL at 21:43

## 2018-03-26 RX ADMIN — LEVALBUTEROL TARTRATE 2 PUFF: 45 AEROSOL, METERED ORAL at 13:08

## 2018-03-26 RX ADMIN — PANTOPRAZOLE SODIUM 40 MG: 40 TABLET, DELAYED RELEASE ORAL at 07:57

## 2018-03-26 RX ADMIN — LEVALBUTEROL TARTRATE 2 PUFF: 45 AEROSOL, METERED ORAL at 16:30

## 2018-03-26 RX ADMIN — NYSTATIN 1000000 UNITS: 500000 SUSPENSION ORAL at 07:56

## 2018-03-26 RX ADMIN — MULTIPLE VITAMINS W/ MINERALS TAB 1 TABLET: TAB at 13:07

## 2018-03-26 RX ADMIN — TACROLIMUS 5.5 MG: 5 CAPSULE ORAL at 07:56

## 2018-03-26 RX ADMIN — LEVALBUTEROL TARTRATE 2 PUFF: 45 AEROSOL, METERED ORAL at 10:49

## 2018-03-26 RX ADMIN — PREDNISONE 10 MG: 10 TABLET ORAL at 21:41

## 2018-03-26 RX ADMIN — METOPROLOL TARTRATE 25 MG: 25 TABLET, FILM COATED ORAL at 21:41

## 2018-03-26 RX ADMIN — NYSTATIN 1000000 UNITS: 500000 SUSPENSION ORAL at 21:41

## 2018-03-26 RX ADMIN — WARFARIN SODIUM 1 MG: 1 TABLET ORAL at 18:22

## 2018-03-26 RX ADMIN — SULFAMETHOXAZOLE AND TRIMETHOPRIM 1 TABLET: 400; 80 TABLET ORAL at 07:56

## 2018-03-26 RX ADMIN — MYCOPHENOLATE MOFETIL 1500 MG: 250 CAPSULE ORAL at 18:22

## 2018-03-26 RX ADMIN — CALCIUM CARBONATE 600 MG (1,500 MG)-VITAMIN D3 400 UNIT TABLET 1 TABLET: at 21:41

## 2018-03-26 NOTE — PLAN OF CARE
Problem: Patient Care Overview  Goal: Plan of Care/Patient Progress Review  OT: Patient continues to demonstrate excellent progress towards goals, able to complete full shower independently while standing including walking room <> shower room. Patient with needed rest breaks between activity, but able to self manage well.

## 2018-03-26 NOTE — PROGRESS NOTES
"CLINICAL NUTRITION SERVICES - ASSESSMENT NOTE     Nutrition Prescription    RECOMMENDATIONS FOR MDs/PROVIDERS TO ORDER:  None at this time.    Malnutrition Status:    Non-severe malnutrition in the context of chronic illness.    Recommendations already ordered by Registered Dietitian (RD):  Oral supplement - scheduled hs chocolate Boost shake     Future/Additional Recommendations:  If patient unable to consume % of nutritionally adequate meal trays TID, or the equivalent with supplements, encourage multiple scheduled snacks and consider adding another supplement (such as shake).     REASON FOR ASSESSMENT  Kecia Blue is a/an 57 year old female assessed by the dietitian for Admission Nutrition Risk Screen for reduced oral intake over the last month    NUTRITION HISTORY  Information obtained from pt and pt's   - Pt reports her appetite as \"terrible\" for 6 months prior to hospitalization. She said that her inability to breathe caused her to be unable to eat. States that her meals took about one hour to eat.  - Kecia said that her intake PTA was ~75% of her \"normal.\" She stated that her \"normal\" has slowly decreased over the past few years, however. In the months prior to admission she would eat toast for breakfast, leftovers for lunch (sop, hot dish, freezer meals), and \"not much\" for dinner. She would snack on a cookie or granola bar and drink 1 Ensure Plus per day. Kecia reports that she still tried to eat as much as possible given her poor appetite.    Chart reviewed:  Per review of inpatient RD notes, pt was on NS from 2/28 - 3/18. NS was most recently Nutren 1.5 @ 50 mL/hr x 14 hours = 700 mL, 1050 kcal (72%), 48 g PRO (75%), 123 g CHO. NJ removed on 3/18. Recent calorie counts from hospitalization = avg intakes of 1522 kcal, 51 g PRO from 3/17 - 3/19.     Note pt did receive post transplant diet education during hospitalization.    CURRENT NUTRITION ORDERS  Diet: Regular  Intake/Tolerance: Pt " "reports that it has been difficult for her to eat her meals given all the appointments in a day.  has been helpful in providing snacks and getting meals from cafeteria. Kecia reports being able to eat a breakfast sandwich (mainly the meat), some soup, and all of chicken wings and garlic toast. Has been snacking on Kind bars. She is also enjoying yogurt, cheese, and fruit.    - Pt reports experiencing some diarrhea d/t medications. She says that she is trying to do her best to eat adequate amounts and high protein, as discussed also in the inpatient setting.    - Pt does not like Boost from the box. Her  said he could bring supplements from home, and patient plans to continue Ensure after discharge.    LABS  Labs reviewed    MEDICATIONS  Thera-Vit-M daily  Calcium plus Vitamin D bid  Magnesium Oxide  Prednisone    ANTHROPOMETRICS  Height: 162.6 cm (5' 4\")  Most Recent Weight: 54.4 kg (120 lb)    IBW: 54.5 kg (100% IBW)  BMI: Normal BMI  Weight History: Pt reports losing ~60# over the past four years, ~20# of which has been in the last year (14% wt loss). She says she then started to maintain her weight over the past several months.   Wt Readings from Last 15 Encounters:   03/24/18 54.4 kg (120 lb)   03/24/18 53.3 kg (117 lb 9.6 oz)   02/20/18 51.5 kg (113 lb 9.6 oz)   01/05/18 54.6 kg (120 lb 4.8 oz)   11/20/17 52.2 kg (115 lb)     Dosing Weight: 54 kg (actual on 3/24)    ASSESSED NUTRITION NEEDS  Estimated Energy Needs: 1620 - 1890 kcals/day (30 - 35 kcals/kg )  Justification: Increased needs, Post-op  Estimated Protein Needs: 70 - 81+ grams protein/day (1.3 - 1.5+ grams of pro/kg)  Justification: Post-op  Estimated Fluid Needs: 1 mL/kcal, or per provider pending fluid status  Justification: Maintenance    PHYSICAL FINDINGS  See malnutrition section below.       MALNUTRITION  % Intake: < 75% for > 7 days (non-severe)  % Weight Loss: Up to 20% in 1 year (non-severe) - suspected, given pt " reporting  Subcutaneous Fat Loss: Facial region: mild, Upper arm and lower arm: mild and thoracic/intercostal: mild  Muscle Loss: Temporal: mild-moderate, Facial and jaw region: moderate, Thoracic region: mild-moderate, Upper arm (bicep, tricep): mild and Upper leg (quadricep, hamstring): mild  Fluid Accumulation/Edema: None noted  Malnutrition Diagnosis: Non-severe malnutrition in the context of chronic illness.    NUTRITION DIAGNOSIS  Inadequate protein-energy intake related to suboptimal appetite, decreasing desire to eat, as evidenced by diet recall and reported intake of ~75% or less over the past 6 months.      INTERVENTIONS  Implementation  Nutrition Education: Encouraged increased food intake (especially protein given post-transplant) and supplement use continued through admission and at home  Modify composition of meals/snacks - chocolate Boost shake (pt had this ordered in the hospital and enjoyed it, would find it helpful for increasing intake)    Goals  Patient to consume % of nutritionally adequate meal trays TID, or the equivalent with supplements/snacks.     Monitoring/Evaluation  Progress toward goals will be monitored and evaluated per protocol.    Nina Moore  Dietetic Intern    I have reviewed and agree with above assessment and plan of care.    Coretta Faustin, RD, LD

## 2018-03-26 NOTE — PROGRESS NOTES
Pulmonary Medicine  Lung Transplant Daily Progress Note   March 26, 2018       Patient: Kecia Blue  MRN: [id number]  Transplant Date: (POD# 24)    Assessment and Plan:   Mrs. Kecia Blue is a 56 y/o female with h/o dermatomyositis) seronegative RA, ILD and pulmonary hypertension who was admitted for emergent lung transplant workup on 2/21. She progressed to V-A ECMO for right heart failure on 2/27 and subsequently received a ]BSLT on 3/1/18. Post operative course complicated by right-sided pleural effusion s/p thoracentesis ~ 1.5 liters removed.     1. S/p bilateral lung transplant: doing very well, improved exercise tolerance, no dyspnea above what she would expect with therapy. Minimal am cough, non productive. DSA 3/1 negative. Sating % on room air. CXR reviewed by me today demonstrates stable small effusions, bibasilar opacities and hazy opacity with the left lung. No PFTs today. No acute issues. On track to discharge on 3/28.   - Continue immunosuppresion including mycophenolate 1500 mg BID, tacrolimus (goal 10-15) and prednisone taper  - Tacrolimus level of 14.4 today, which was a 12 1/2 hour trough. No dose changes. Next tacrolimus level on Thursday, or Friday if discharged.  - Current prophylaxis with Bactrim, Valcyte (CMV D+/R+) and nystatin  - Labs M/Th   - CMV pending for today  - CXR Mondays and PFTs Thursdays (order placed, please arrange for PFTs on 3rd floor)   - Messaged coordinator to set up clinic visit on Friday, 3/30    2. Hypomagnesemia: secondary to CNI use. Mg of 1.8 today.   - Mg oxide 400 mg daily    Ame Chow PA-C  Pulmonary, Allergy, Critical Care and Sleep Medicine  Pager [phone]  After 5 pm weekdays or all days on weekends, page the Pulmonary On-Call (Fellow)    Interval History:   Feeling very well, ready to discharge. Denies shortness of breath, other than what she would expect from therapy, minimal non productive am cough. No chest pain, incisional pain,  "fever, chills or headaches. Denies nausea, vomiting or cramps. Stools are loose, but slowly improving.     Physical Exam:   /76 (BP Location: Right arm)  Pulse 102  Temp 97.5  F (36.4  C) (Oral)  Resp 21  Ht 1.626 m (5' 4\")  Wt 54.4 kg (120 lb)  SpO2 97%  BMI 20.6 kg/m2    GENERAL: alert, NAD  HEENT: NCAT, EOMI, no scleral icterus, oral mucosa moist and without lesions  Neck: no cervical or supraclavicular adenopathy  Lungs: good air flow, few scattered crackles in bilateral bases  CV: RRR, S1S2, no murmurs noted  Abdomen: normoactive BS, soft, non tender   Lymph: no edema  Neuro: AAO X 3, CN 2-12 grossly intact  Psychiatric: normal affect, good eye contact  Skin: no rash, jaundice or lesions on limited exam      Medications:     Active Medications:    levalbuterol  2 puff Inhalation 4x Daily     nystatin  1,000,000 Units Swish & Swallow 4x Daily     calcium-vitamin D  1 tablet Oral BID w/meals     magnesium oxide  400 mg Oral Daily     metoprolol tartrate  25 mg Oral BID     multivitamin, therapeutic with minerals  1 tablet Oral Daily     mycophenolate  1,500 mg Oral BID     pantoprazole  40 mg Oral QAM     polyethylene glycol  17 g Oral BID     QUEtiapine  50 mg Oral At Bedtime     senna-docusate  2 tablet Oral At Bedtime     sulfamethoxazole-trimethoprim  1 tablet Oral or NG Tube Daily     tacrolimus  5.5 mg Oral two times daily     valGANciclovir  900 mg Oral Daily     predniSONE  12.5 mg Oral Daily with breakfast     predniSONE  10 mg Oral At Bedtime     Active PRN Medications:  - MEDICATION INSTRUCTIONS -, ondansetron, Warfarin Therapy Reminder    Lines, Drains, and Devices:          Data:     All vital signs, laboratory and imaging data for the past 24 hours reviewed.      Vital signs:  Temp: 97.5  F (36.4  C) Temp src: Oral BP: 109/76 Pulse: 102   Resp: 21 SpO2: 97 % O2 Device: None (Room air)   Height: 162.6 cm (5' 4\") Weight: 54.4 kg (120 lb)    Weight trend:   Vitals:    03/24/18 1325 "   Weight: 54.4 kg (120 lb)        I/O: No intake or output data in the 24 hours ending 03/25/18 2008    Labs    CMP:   Recent Labs  Lab 03/24/18  0535 03/23/18  0605 03/22/18  0630 03/21/18  2333 03/21/18  0529    137 137  --  139   POTASSIUM 5.3 5.2 4.8  --  4.9   CHLORIDE 103 105 106  --  107   CO2 24 24 21  --  22   ANIONGAP 10 8 11  --  10   * 110* 137*  --  108*   BUN 34* 30 32*  --  36*   CR 1.15* 1.00 1.03  --  1.18*   GFRESTIMATED 49* 58* 56*  --  48*   GFRESTBLACK 59* 70 67  --  57*   CHELSEY 8.6 8.5 8.4*  --  8.9   MAG 1.9 1.8 2.0 2.4* 1.6   PHOS 3.6 3.1 3.0  --  3.3   PROTTOTAL  --   --   --   --  5.6*   ALBUMIN 2.2* 2.4* 2.3*  --  2.5*   ALT 29 28 24  --  26     CBC:   Recent Labs  Lab 03/24/18  0535 03/23/18  0605 03/22/18 0630 03/21/18  0529   WBC 4.1 5.0 5.1 4.8   RBC 2.61* 3.15* 2.99* 2.85*   HGB 8.2* 9.9* 9.3* 9.0*   HCT 26.5* 32.3* 30.5* 29.3*   * 103* 102* 103*   MCH 31.4 31.4 31.1 31.6   MCHC 30.9* 30.7* 30.5* 30.7*   RDW 20.3* 20.6* 20.8* 21.3*    358 345 298     INR:   Recent Labs  Lab 03/25/18  0704 03/24/18  0535 03/23/18  0605 03/22/18  0630   INR 2.98* 2.23* 1.61* 1.25*     Glucose:   Recent Labs  Lab 03/24/18  0535 03/23/18  0605 03/22/18  0630 03/21/18  0529 03/20/18  0456 03/19/18  0450 03/18/18  2326   * 110* 137* 108* 121* 115*  --    BGM  --   --   --   --   --   --  163*     Blood Gas: No lab results found in last 7 days.  Culture Data   Recent Labs  Lab 03/22/18  1049   CULT Culture received and in progress.  Positive AFB results are called as soon as detected.  Final report to follow in 7 to 8 weeks.  Assayed at [facility] [address] [place] [id number] [phone]  Culture negative after 1 day  Culture negative monitoring continues     Virology Data: Lab Results   Component Value Date    FLUAH1 Negative 02/27/2018    FLUAH3 Negative 02/27/2018    GZ8725 Negative 02/27/2018    IFLUB Negative 02/27/2018    RSVA Negative 02/27/2018     RSVB Negative 02/27/2018    PIV1 Negative 02/27/2018    PIV2 Negative 02/27/2018    PIV3 Negative 02/27/2018    HMPV Negative 02/27/2018    HRVS Negative 02/27/2018    ADVBE Negative 02/27/2018    ADVC Negative 02/27/2018     Historical CMV results (last 3 of prior testing):  Lab Results   Component Value Date    CMVQNT Canceled, Test credited (A) 03/01/2018    CMVQNT Canceled, Test credited (A) 03/01/2018     Lab Results   Component Value Date    CMVLOG Canceled, Test credited 03/01/2018    CMVLOG Canceled, Test credited 03/01/2018     Urine Studies  Recent Labs   Lab Test  03/04/18   1425   URINEPH  5.5   NITRITE  Negative   LEUKEST  Negative   WBCU  5

## 2018-03-26 NOTE — CONSULTS
[facility] Internal Medicine Consultation    Kecia Blue MRN# [id number]   Age: 57 year old YOB: 1960   Date of Admission: 3/24/2018     Reason for consult: S/p lung transplant        Requesting physician         Level of consult: Consult, follow and place orders           Assessment and Plan:     Kecia Blue is a 58 y/o female w/PMHx significant for dermatomyositis (on immunosuppression), seronegative RA, ILD, and pulmonary hypertension who was admitted for emergent lung transplant w/u on 2/21 for increased SOB and hypoxia. Required V-A ECMO for right heart failure on 2/27. Now s/p BSLT on 3/1 with ECMO decannulation on 3/3. Post-op course c/b hemodynamic instability requiring pressors and iFlolan, transitioned to Revatio now d/c'd. Right sided thoracentesis 3/22. Continues to make good progress overall. Ready for ARU placement, waiting bed availability.            S/p bilateral sequential lung transplant (3/1/18) for acute hypoxic/hypercapneic respiratory failure 2/2 ILD:  Post-op Pulm HTN 2/2 ILD:   Right sided pleural effusion, Resolved:  Adequate graft function. Weaned off iFlolan and PO Revatio as RV has returned to normal function. Extubated 3/6 and has remained stable on RA with minimal DESHPANDE. Surveillance bronchoscopy 3/13 with bilateral anastomoses intact and patent, thick yellow secretions around right anastomosis, suctioned and removed. Right-sided pleural effusion noted on imaging. S/p thoracentesis ~ 1.5 liters removed on 3/22.     - Pleural cultures NGTD, however neutrophil dominate (anticipate lymphocyte dominate so close to time of transplant). Continue to follow closely.    Continue aggressive pulmonary toilet with acapella/incentive spirometry q 1hr while awake.                      Continue 3-drug immunosuppression:  - Tacrolimus 5.5mg BID. Target goal 10-15. Tacrolimus drug levels every Mon/Thurs  - MMF 1500 g BID   - Prednisone 12.5mg qAM / 10mg qPM. Steroid taper  below:  Date AM (mg) PM (mg)   3/22/18 12.5 10   4/26/18 10 10   5/24/18 10 7.5   6/22/18 7.5 7.5   7/20/18 7.5 5   8/24/18 5 5   9/21/18 5 2.5       Prophylaxis:   PJP: Bactrim  CMV: vangancyclovir  Fungal: Nystatin     Donor Recipient Intervention   CMV status  Pos   Pos  valgancyclovir POD #8-90   EBV status Pos Pos Immune    HSV status N/A  Pos Immune       Infectious disease:  SIRS, Resolved:  No known infectious disease history. Post-operative SIRS likely a response from multiple procedures (VA ECMO cannulation and decannulation) and transplant surgery. Remains afebrile and without leukocytosis. Negative recipient bronch culture from time of transplant.  Donor culture at OSH grew MRSA, Strep pneumoniae, Moraxella catarrhalis, and Haemophilus species. Donor culture at [facility] grew MRSA. S/p 2-week course of vancomycin and pip/tazo.        RADHA, Resolved:  Volume overload, Resolved:  Suspect secondary to meds and diuresis. Serum Cr+ and BLE edema now back to WNL. LE edema likely 2/2 low albumin state. Improved with diuretics.   -  Was on Bumex 1 mg PO daily which was discontinued up on discharge  Appears euvolemic   Renal functions stable Cr.1.15       LUE DVT, Provoked:  Noted on UE ultrasound on 3/7. On coumadin   - Warfarin, pharm to dose.   - INR goal 2-3. Will need 3-months of anticoagulation      Acute anemia:  Hgb with slow drift to 7's. Received transfusion 3/8 and 3/15 with appropriate response. No evidence of bleeding. Likely 2/2 multiple lab draws, bone marrow suppressive medications. Stable hgb low 9s.  Cbc M TH     Hypomagnesemia:   - Mg oxide 400 mg daily     Nolberto mckeon MD   Hospitalist        Chief Complaint:     S/p Lung transplant   HPI:  is a 57-year-old female patient with past medical history significant with dermatomyositis, seronegative rheumatoid arthritis, interstitial lung disease, pulmonary hypertension who was admitted  on 2/21 for shortness of breath.  She required ECMO for right  heart failure on 2/27/2018.  She successfully underwent bilateral sequential lung transplant on 3/1/2018 with ECMO decannulation on 3/3/18.  Postop course was complicated by hemodynamic instability requiring vasopressors ,iFlolan, later transition to Revatio which was discontinued on 3/21/2018.  She was asked subsequently transitioned to acute rehab for further strengthening and therapy.  She is currently on room air, resting comfortably in bed not appears to be any distress.    She is able to participate in physical therapy and Occupational Therapy.  Patient reports that she is able to ambulate more.  She denies any shortness of breath denies any chest pain denies any fever or chills.  She was on Bumex which was discontinued at the time of discharge due to slight elevation of creatinine, also was on sildenafil which was discontinued prior to the discharge.             Past Medical History:     Past Medical History:   Diagnosis Date     Antisynthetase syndrome (H) 2014     Chronic cough      Dermatomyositis (H)      Dysplasia of cervix, low grade (ESTRADA 1)      ILD (interstitial lung disease) (H)      Osteopenia      Pulmonary hypertension      Raynaud's disease      Seronegative rheumatoid arthritis (H)              Past Surgical History:     Past Surgical History:   Procedure Laterality Date     BRONCHOSCOPY FLEXIBLE N/A 3/13/2018    Procedure: BRONCHOSCOPY FLEXIBLE;  Flexible Bronchoscopy ;  Surgeon: Gissell Sanchez MD;  Location: [facility]     ENT SURGERY      tonsillectomy as a child     INSERT EXTRACORPORAL MEMBRANE OXYGENATOR ADULT (OUTSIDE OR) N/A 2/27/2018    Procedure: INSERT EXTRACORPORAL MEMBRANE OXYGENATOR ADULT (OUTSIDE OR);  INSERT EXTRACORPORAL MEMBRANE OXYGENATOR ADULT (OUTSIDE OR) ;  Surgeon: Toby Hernandez MD;  Location:  OR     no prior surgery       REMOVE EXTRACORPORAL MEMBRANE OXYGENATOR ADULT N/A 3/3/2018    Procedure: REMOVE EXTRACORPORAL MEMBRANE OXYGENATOR ADULT;  Removal of  Right Femoral Venous and Right Axillary Arterial Extracorporeal Membrane Oxygenator;  Surgeon: Toby Hernandez MD;  Location: [facility]     TRANSPLANT LUNG RECIPIENT SINGLE X2 Bilateral 3/1/2018    Procedure: TRANSPLANT LUNG RECIPIENT SINGLE X2;  Median Sternotomy, Extracorporeal Membrane Oxygenator, bilateral sequential lung transplant;  Surgeon: Toby Hernandez MD;  Location: [facility]             Social History:     Social History   Substance Use Topics     Smoking status: Never Smoker     Smokeless tobacco: Never Used     Alcohol use 0.6 oz/week     1 Glasses of wine per week      Comment: 1 glass 3 times weekly             Family History:     Family History   Problem Relation Age of Onset     Hypertension Mother      Arthritis Mother      Pancreatic Cancer Father      DIABETES Father              Immunizations:     There is no immunization history on file for this patient.          Allergies:   No Known Allergies          Medications:     Prescriptions Prior to Admission   Medication Sig Dispense Refill Last Dose     magnesium oxide (MAG-OX) 400 MG tablet Take 1 tablet (400 mg) by mouth daily 30 tablet  3/24/2018 at 1150     levalbuterol (XOPENEX HFA) 45 MCG/ACT Inhaler Inhale 2 puffs into the lungs every 4 hours   3/24/2018 at 1155     QUEtiapine (SEROQUEL) 50 MG tablet Take 1 tablet (50 mg) by mouth At Bedtime 120 tablet  3/23/2018 at 2152     valGANciclovir (VALCYTE) 450 MG tablet Take 2 tablets (900 mg) by mouth daily   3/24/2018 at 0802     mycophenolate (GENERIC EQUIVALENT) 250 MG capsule Take 6 capsules (1,500 mg) by mouth 2 times daily   3/24/2018 at 0803     tacrolimus (GENERIC EQUIVALENT) 0.5 MG capsule Take 11 capsules (5.5 mg) by mouth 2 times daily   3/24/2018 at 0802     metoprolol tartrate (LOPRESSOR) 25 MG tablet Take 1 tablet (25 mg) by mouth 2 times daily 60 tablet  3/24/2018 at 1150     polyethylene glycol (MIRALAX/GLYCOLAX) Packet Take 17 g by mouth 2 times daily 60 packet   "3/24/2018 at 0803     calcium-vitamin D (CALTRATE) 600-400 MG-UNIT per tablet Take 1 tablet by mouth 2 times daily (with meals) 60 tablet  3/24/2018 at 0802     sulfamethoxazole-trimethoprim (BACTRIM/SEPTRA) 400-80 MG per tablet 1 tablet by Oral or NG Tube route daily  0 3/24/2018 at 0802     pantoprazole (PROTONIX) 40 MG EC tablet Take 1 tablet (40 mg) by mouth every morning 30 tablet  3/24/2018 at 0802     calcium carbonate (OS-CHELSEY 500 MG [place]) 1250 MG tablet Take 1 tablet by mouth 2 times daily   3/24/2018     multivitamin, therapeutic with minerals (THERA-VIT-M) TABS tablet Take 1 tablet by mouth daily   3/24/2018 at 0802     ondansetron (ZOFRAN) 2 MG/ML SOLN injection Inject 2 mLs (4 mg) into the vein every 6 hours as needed for nausea or vomiting 15 mL  Unknown at Unknown time     senna-docusate (SENOKOT-S;PERICOLACE) 8.6-50 MG per tablet Take 2 tablets by mouth At Bedtime 100 tablet  3/22/2018 at 1957     predniSONE (DELTASONE) 5 MG tablet Date AM (mg) PM (mg)  3/22/18 12.5 10  4/26/18 10 10  5/24/18 10 7.5  6/22/18 7.5 7.5  7/20/18 7.5 5  8/24/18 5 5  9/21/18 5 2.5   Unknown at Unknown time     order for DME Equipment being ordered: Oxygen 5 liters at rest, 15 liters with exertion.  Needs portability.  Please provide 2 10-liter concentrators for in the home 1 Device prn Unknown at Unknown time     nystatin (MYCOSTATIN) 016006 UNIT/ML suspension Take 100,000 Units by mouth 4 times daily 100,000 unit/ ml, take 4- 6 ml by mouth 4 times a day.   Unknown at Unknown time             Review of Systems:   A comprehensive review of systems was performed and found to be negative except as described in this note         Physical Exam:   /83 (BP Location: Right arm)  Pulse 115  Temp 98.3  F (36.8  C) (Oral)  Resp 20  Ht 1.626 m (5' 4\")  Wt 54.4 kg (120 lb)  SpO2 100%  BMI 20.6 kg/m2  Gen- pleasant   HEENT- NAD, DILIP  Neck- supple, no JVD elevation, no thyromegaly  CVS- I+II+ no m/r/g  CHEST: surgical " wound healing well   RS- CTABL, No wheeze, No rhonchi  GI- Abdomen soft, no tenderness . Non distended, BS+ no organomegaly   Ext- no edema, peripheral pulse intact,  CNS- AAA, no focal signs   SKIN: Intact ,no rash              Data:   BMP  Recent Labs  Lab 03/26/18  0612 03/24/18  0535 03/23/18  0605 03/22/18  0630    137 137 137   POTASSIUM 5.2 5.3 5.2 4.8   CHLORIDE 104 103 105 106   CHELSEY 9.0 8.6 8.5 8.4*   CO2 28 24 24 21   BUN 34* 34* 30 32*   CR 1.18* 1.15* 1.00 1.03   * 131* 110* 137*     CBC  Recent Labs  Lab 03/26/18  0612 03/24/18  0535 03/23/18  0605 03/22/18  0630   WBC 4.5 4.1 5.0 5.1   RBC 2.85* 2.61* 3.15* 2.99*   HGB 8.9* 8.2* 9.9* 9.3*   HCT 28.5* 26.5* 32.3* 30.5*    102* 103* 102*   MCH 31.2 31.4 31.4 31.1   MCHC 31.2* 30.9* 30.7* 30.5*   RDW 19.5* 20.3* 20.6* 20.8*   * 400 358 345     INR  Recent Labs  Lab 03/26/18  0612 03/25/18  0704 03/24/18  0535 03/23/18  0605   INR 2.80* 2.98* 2.23* 1.61*     LFTs  Recent Labs  Lab 03/24/18  0535 03/23/18  0605 03/22/18  0630 03/21/18  0529   ALT 29 28 24 26   PROTTOTAL  --   --   --  5.6*   ALBUMIN 2.2* 2.4* 2.3* 2.5*      PANCNo lab results found in last 7 days.      .

## 2018-03-26 NOTE — PLAN OF CARE
Problem: Goal/Outcome  Goal: Goal Outcome Summary  Outcome: Improving  FOCUS/GOAL  Medical management    ASSESSMENT, INTERVENTIONS AND CONTINUING PLAN FOR GOAL:  Patient slept well. No c/o pain. She is independent in the room, she reported she used the toilet tonight. No BM.

## 2018-03-26 NOTE — PLAN OF CARE
Problem: Patient Care Overview  Goal: Plan of Care/Patient Progress Review  FOCUS/GOAL  Medication management and Medical management    ASSESSMENT, INTERVENTIONS AND CONTINUING PLAN FOR GOAL:  Patient is alert/oriented x4, VSS and denies any pain on this shift. Patient has been able be safe with independent in room status, patient's  at bedside all day and is supportive.  Patient still to be followed by hospitalist, no new orders yet at this time.  Patient had routine chest xray today, provider to f/u.  Patient still has some intermittent cough but more infrequent, uses acapella and IS independently every hour, no crackles noted today on auscultation, continue to monitor.  No additional care concerns at this time, continue with POC.

## 2018-03-26 NOTE — PLAN OF CARE
Problem: Patient Care Overview  Goal: Plan of Care/Patient Progress Review  PT: progressing well with STS from lower surfaces, long distance ambulation, and stair negotiation. Planning for CC tomorrow and d/c to [place] on Wednesday. Needing OP Pulm Rehab setup.    Extensive incentive spirometer education provided to pt and family.

## 2018-03-26 NOTE — PLAN OF CARE
FOCUS/GOAL  Bowel management, Bladder management, Medication management, Pain management, Medical management, Mobility and Cognition/Memory/Judgment/Problem solving    ASSESSMENT, INTERVENTIONS AND CONTINUING PLAN FOR GOAL:  Pt is A&Ox4, no report of pain or discomfort. Pt approved to be independent in room. She is continent of bladder using toilet in bathroom. Pt is having some slight bowel incontinence, she is wearing maxi pad, and continues to have loose stools, stool softeners declined. Minimal intermittent nonproductive coughing noted this shift. Pt presented with tachycardia and tachypnea which is her baseline per verbal nursing and Pt report. Sepsis protocol triggered, lactic acid WNL, but noted to be trending up. This writer followed up on previous shift's questions for hospitalist, PMR MD paged regarding med questions, referred to House Officer to have hospitalist see Pt and answer med questions. House officer able to answer med question regarding Bumex over the phone, but did not see Pt since need was not urgent, hospitalist to see Pt 3/26.

## 2018-03-26 NOTE — PROGRESS NOTES
[facility] Nurse Inpatient Wound Assessment     Follow up  Assessment  Reason for consultation: Evaluate and treat coccyx    D: Per Md Notes: 56 y/o female w/PMHx significant for dermatomyositis (on immunosuppression), seronegative RA, ILD, and pulmonary hypertension who was admitted for emergent lung transplant w/u on 2/21 for increased SOB and hypoxia. Required V-A ECMO for right heart failure on 2/27. Now s/p bilateral sequential lung transplant on 3/1, ECMO decannulation on 3/3. Post-op course c/b hemodynamic instability requiring pressors and iFlolan (off, but now on PO Revatio). Extubated 3/7, sating well on RA. Making gradual progress overall.     A: No pressure injury to coccyx    I: Orders entered:  No sting  dailyto prevent abrasions when repostioning self in bed, avoid green underpad     P: Teach patient  ways to reposition self to avoid shearing to sacrum.        Educated patient and  regarding pressure ulcer prevention. Turning and repositioning.      WO Nurse follow-up plan:Sign off  Nursing to notify the Provider(s) and re-consult the WO Nurse if wound(s) deteriorates or new skin concern.    Discussed plan of care with Patient and Family

## 2018-03-27 LAB
BACTERIA SPEC CULT: NO GROWTH
CMV DNA SPEC NAA+PROBE-ACNC: NORMAL [IU]/ML
CMV DNA SPEC NAA+PROBE-LOG#: NORMAL {LOG_IU}/ML
INR PPP: 2.43 (ref 0.86–1.14)
SPECIMEN SOURCE: NORMAL
SPECIMEN SOURCE: NORMAL

## 2018-03-27 PROCEDURE — 12800006 ZZH R&B REHAB

## 2018-03-27 PROCEDURE — 97112 NEUROMUSCULAR REEDUCATION: CPT | Mod: GP | Performed by: PHYSICAL THERAPIST

## 2018-03-27 PROCEDURE — 97530 THERAPEUTIC ACTIVITIES: CPT | Mod: GO

## 2018-03-27 PROCEDURE — 25000132 ZZH RX MED GY IP 250 OP 250 PS 637: Performed by: INTERNAL MEDICINE

## 2018-03-27 PROCEDURE — 40000193 ZZH STATISTIC PT WARD VISIT

## 2018-03-27 PROCEDURE — 40000187 ZZH STATISTIC PATIENT MED CONFERENCE < 30 MIN

## 2018-03-27 PROCEDURE — 99232 SBSQ HOSP IP/OBS MODERATE 35: CPT | Performed by: INTERNAL MEDICINE

## 2018-03-27 PROCEDURE — 85610 PROTHROMBIN TIME: CPT | Performed by: INTERNAL MEDICINE

## 2018-03-27 PROCEDURE — 25000125 ZZHC RX 250: Performed by: INTERNAL MEDICINE

## 2018-03-27 PROCEDURE — 25000131 ZZH RX MED GY IP 250 OP 636 PS 637: Performed by: INTERNAL MEDICINE

## 2018-03-27 PROCEDURE — 40000183 ZZH STATISTIC PT MED CONFERENCE < 30 MIN: Performed by: PHYSICAL THERAPIST

## 2018-03-27 PROCEDURE — 40000133 ZZH STATISTIC OT WARD VISIT

## 2018-03-27 PROCEDURE — 97535 SELF CARE MNGMENT TRAINING: CPT | Mod: GO

## 2018-03-27 PROCEDURE — 36415 COLL VENOUS BLD VENIPUNCTURE: CPT | Performed by: INTERNAL MEDICINE

## 2018-03-27 PROCEDURE — 97110 THERAPEUTIC EXERCISES: CPT | Mod: GP | Performed by: PHYSICAL THERAPIST

## 2018-03-27 PROCEDURE — 97530 THERAPEUTIC ACTIVITIES: CPT | Mod: GP

## 2018-03-27 PROCEDURE — 25000125 ZZHC RX 250: Performed by: PHYSICAL MEDICINE & REHABILITATION

## 2018-03-27 PROCEDURE — 97110 THERAPEUTIC EXERCISES: CPT | Mod: GO

## 2018-03-27 PROCEDURE — 25000132 ZZH RX MED GY IP 250 OP 250 PS 637: Performed by: PHYSICAL MEDICINE & REHABILITATION

## 2018-03-27 PROCEDURE — 40000193 ZZH STATISTIC PT WARD VISIT: Performed by: PHYSICAL THERAPIST

## 2018-03-27 RX ORDER — MYCOPHENOLATE MOFETIL 250 MG/1
1500 CAPSULE ORAL 2 TIMES DAILY
Qty: 180 CAPSULE | Refills: 1 | Status: SHIPPED | OUTPATIENT
Start: 2018-03-27 | End: 2018-03-27

## 2018-03-27 RX ORDER — WARFARIN SODIUM 4 MG/1
4 TABLET ORAL
Status: COMPLETED | OUTPATIENT
Start: 2018-03-27 | End: 2018-03-27

## 2018-03-27 RX ORDER — LEVALBUTEROL TARTRATE 45 UG/1
2 AEROSOL, METERED ORAL 4 TIMES DAILY
Qty: 1 INHALER | Refills: 1 | Status: SHIPPED | OUTPATIENT
Start: 2018-03-27 | End: 2018-03-30

## 2018-03-27 RX ORDER — METOPROLOL TARTRATE 25 MG/1
25 TABLET, FILM COATED ORAL 2 TIMES DAILY
Qty: 60 TABLET | Refills: 1 | Status: SHIPPED | OUTPATIENT
Start: 2018-03-27 | End: 2018-05-18

## 2018-03-27 RX ORDER — VALGANCICLOVIR 450 MG/1
450 TABLET, FILM COATED ORAL DAILY
Qty: 62 TABLET | Refills: 0 | Status: SHIPPED | OUTPATIENT
Start: 2018-03-27 | End: 2018-03-28

## 2018-03-27 RX ORDER — TACROLIMUS 0.5 MG/1
5.5 CAPSULE ORAL 2 TIMES DAILY
Qty: 700 CAPSULE | Refills: 0 | Status: SHIPPED | OUTPATIENT
Start: 2018-03-27 | End: 2018-03-27

## 2018-03-27 RX ORDER — TACROLIMUS 0.5 MG/1
5.5 CAPSULE ORAL 2 TIMES DAILY
Qty: 650 CAPSULE | Refills: 1 | Status: SHIPPED | OUTPATIENT
Start: 2018-03-27 | End: 2018-04-05

## 2018-03-27 RX ORDER — SULFAMETHOXAZOLE AND TRIMETHOPRIM 400; 80 MG/1; MG/1
1 TABLET ORAL DAILY
Qty: 30 TABLET | Refills: 0 | Status: SHIPPED | OUTPATIENT
Start: 2018-03-27 | End: 2018-04-30

## 2018-03-27 RX ORDER — NYSTATIN 100000/ML
100000 SUSPENSION, ORAL (FINAL DOSE FORM) ORAL 4 TIMES DAILY
Qty: 280 ML | Refills: 1 | Status: SHIPPED | OUTPATIENT
Start: 2018-03-27 | End: 2018-04-05

## 2018-03-27 RX ORDER — PANTOPRAZOLE SODIUM 40 MG/1
40 TABLET, DELAYED RELEASE ORAL EVERY MORNING
Qty: 30 TABLET | Refills: 1 | Status: SHIPPED | OUTPATIENT
Start: 2018-03-27 | End: 2018-05-18

## 2018-03-27 RX ORDER — MYCOPHENOLATE MOFETIL 250 MG/1
1500 CAPSULE ORAL 2 TIMES DAILY
Qty: 200 CAPSULE | Refills: 1 | Status: SHIPPED | OUTPATIENT
Start: 2018-03-27 | End: 2018-04-05

## 2018-03-27 RX ORDER — VALGANCICLOVIR 450 MG/1
450 TABLET, FILM COATED ORAL DAILY
Qty: 60 TABLET | Refills: 0 | Status: SHIPPED | OUTPATIENT
Start: 2018-03-27 | End: 2018-03-27

## 2018-03-27 RX ORDER — POLYETHYLENE GLYCOL 3350 17 G/17G
17 POWDER, FOR SOLUTION ORAL 2 TIMES DAILY
Qty: 60 PACKET | Refills: 0 | Status: SHIPPED | OUTPATIENT
Start: 2018-03-27 | End: 2018-04-03

## 2018-03-27 RX ORDER — AMOXICILLIN 250 MG
2 CAPSULE ORAL AT BEDTIME
Qty: 100 TABLET | Refills: 0 | Status: SHIPPED | OUTPATIENT
Start: 2018-03-27 | End: 2018-03-30

## 2018-03-27 RX ORDER — PREDNISONE 5 MG/1
TABLET ORAL
Qty: 300 TABLET | Refills: 1 | Status: SHIPPED | OUTPATIENT
Start: 2018-03-27 | End: 2018-04-30

## 2018-03-27 RX ORDER — MULTIPLE VITAMINS W/ MINERALS TAB 9MG-400MCG
1 TAB ORAL DAILY
Qty: 30 EACH | Refills: 0 | Status: SHIPPED | OUTPATIENT
Start: 2018-03-27 | End: 2018-04-30

## 2018-03-27 RX ORDER — VALGANCICLOVIR 450 MG/1
900 TABLET, FILM COATED ORAL DAILY
Qty: 60 TABLET | Refills: 0 | Status: SHIPPED | OUTPATIENT
Start: 2018-03-27 | End: 2018-03-27

## 2018-03-27 RX ORDER — MAGNESIUM OXIDE 400 MG/1
400 TABLET ORAL DAILY
Qty: 30 TABLET | Refills: 1 | Status: SHIPPED | OUTPATIENT
Start: 2018-03-27 | End: 2018-05-18

## 2018-03-27 RX ADMIN — MYCOPHENOLATE MOFETIL 1500 MG: 250 CAPSULE ORAL at 08:41

## 2018-03-27 RX ADMIN — CALCIUM CARBONATE 600 MG (1,500 MG)-VITAMIN D3 400 UNIT TABLET 1 TABLET: at 21:42

## 2018-03-27 RX ADMIN — NYSTATIN 1000000 UNITS: 500000 SUSPENSION ORAL at 08:45

## 2018-03-27 RX ADMIN — VALGANCICLOVIR 450 MG: 450 TABLET, FILM COATED ORAL at 08:41

## 2018-03-27 RX ADMIN — TACROLIMUS 5.5 MG: 5 CAPSULE ORAL at 17:46

## 2018-03-27 RX ADMIN — MYCOPHENOLATE MOFETIL 1500 MG: 250 CAPSULE ORAL at 17:46

## 2018-03-27 RX ADMIN — NYSTATIN 1000000 UNITS: 500000 SUSPENSION ORAL at 13:19

## 2018-03-27 RX ADMIN — MULTIPLE VITAMINS W/ MINERALS TAB 1 TABLET: TAB at 13:18

## 2018-03-27 RX ADMIN — PANTOPRAZOLE SODIUM 40 MG: 40 TABLET, DELAYED RELEASE ORAL at 08:42

## 2018-03-27 RX ADMIN — CALCIUM CARBONATE 600 MG (1,500 MG)-VITAMIN D3 400 UNIT TABLET 1 TABLET: at 13:18

## 2018-03-27 RX ADMIN — NYSTATIN 1000000 UNITS: 500000 SUSPENSION ORAL at 16:47

## 2018-03-27 RX ADMIN — SULFAMETHOXAZOLE AND TRIMETHOPRIM 1 TABLET: 400; 80 TABLET ORAL at 08:45

## 2018-03-27 RX ADMIN — LEVALBUTEROL TARTRATE 2 PUFF: 45 AEROSOL, METERED ORAL at 16:47

## 2018-03-27 RX ADMIN — MAGNESIUM OXIDE TAB 400 MG (241.3 MG ELEMENTAL MG) 400 MG: 400 (241.3 MG) TAB at 13:18

## 2018-03-27 RX ADMIN — METOPROLOL TARTRATE 25 MG: 25 TABLET, FILM COATED ORAL at 20:49

## 2018-03-27 RX ADMIN — LEVALBUTEROL TARTRATE 2 PUFF: 45 AEROSOL, METERED ORAL at 20:49

## 2018-03-27 RX ADMIN — TACROLIMUS 5.5 MG: 5 CAPSULE ORAL at 08:41

## 2018-03-27 RX ADMIN — LEVALBUTEROL TARTRATE 2 PUFF: 45 AEROSOL, METERED ORAL at 08:45

## 2018-03-27 RX ADMIN — NYSTATIN 1000000 UNITS: 500000 SUSPENSION ORAL at 20:49

## 2018-03-27 RX ADMIN — METOPROLOL TARTRATE 25 MG: 25 TABLET, FILM COATED ORAL at 08:42

## 2018-03-27 RX ADMIN — WARFARIN SODIUM 4 MG: 4 TABLET ORAL at 17:46

## 2018-03-27 RX ADMIN — PREDNISONE 12.5 MG: 2.5 TABLET ORAL at 08:42

## 2018-03-27 RX ADMIN — LEVALBUTEROL TARTRATE 2 PUFF: 45 AEROSOL, METERED ORAL at 13:21

## 2018-03-27 RX ADMIN — PREDNISONE 10 MG: 10 TABLET ORAL at 21:42

## 2018-03-27 NOTE — PLAN OF CARE
Problem: Individualization  Goal: Patient Individualization  Outcome: Improving  FOCUS/GOAL  Bladder management and Medical management    ASSESSMENT, INTERVENTIONS AND CONTINUING PLAN FOR GOAL:  Alert & oriented, uses call light to make needs known. Independent with bed mobility, transfers and toileting. Continent of bladder, reported no BM this shift. Denies pain or discomfort. Pt was noted to be resting comfortable during rounds.

## 2018-03-27 NOTE — PLAN OF CARE
Problem: Patient Care Overview  Goal: Discharge Needs Assessment  03/27/18 8683     Debra Stahl-Registered Nurse (Nursing)  Pt. And spouse seen at bedside for General post lung transplant review. State they have gone through and read most of handbook and viewed mytransplant video. Asked relevant questions. Answered all teach-back questions appropriately. Pt. States she feels ready for discharge. Warfarin teaching completed while in hospital. States they have not further questions at this time.

## 2018-03-27 NOTE — PROGRESS NOTES
PM&R Daily Note:    57-year-old status post bilateral lung transplant 3/1/18 for interstitial lung disease and pulmonary hypertension.  Initially admitted 2/21 increasing work of breathing ultimately required ECMO and bridge until transplant 3/1.  Numerous posttransplant challenges as summarized in discharge summary.  After relative stabilization she transferred to inpatient acute rehab 3/24 for comprehensive cares.    Very pleased with the progress Kecia has been making and only a few days of acute rehab so far.  We have already advanced her to up independent with ambulation.  She is slowly building on her endurance and is doing consistently well off of oxygen.  Blood pressure and other vital signs all remained stable.  I reviewed progress also with pulmonology physician assistant mAe Chow.  With this more rapid progress, we may target discharge as early as Wednesday.  We have care conference tomorrow 10 AM to review final plans.  Anticipate outpatient pulmonary rehab.  She will be staying locally [facility].    We will try off Seroquel.  Change order from scheduled 50 mg at bedtime, to 25-50 mg at bedtime as needed.  Immunosuppression continues with mycophenolate, tacrolimus, prednisone  Blood pressure heart rates well controlled with metoprolol.  She is off historical sildenafil previously for pulmonary hypertension  Infectious prophylaxis with Bactrim and valganciclovir.  Dose of valganciclovir reduced at 450 mg daily because of creatinine clearance      Vitals:    03/26/18 0753 03/26/18 0939 03/26/18 1000 03/26/18 1657   BP: 123/83 120/83  111/80   BP Location: Right arm Right arm  Right arm   Pulse: 100 115  96   Resp:   20 18   Temp: 98.3  F (36.8  C)   96.5  F (35.8  C)   TempSrc: Oral   Oral   SpO2: 97% 100%  100%   Weight:       Height:         Alert pleasant bright affect fluent speech and language  Heart regular  Lungs  slight bibasilar crackles  No peripheral edema  Fingers cold, bit cyanotic, she  reports consistent with her historical raynaud's.     40 minutes spent today, 20 minutes in direct patient/ interaction, remainder in coordination of care.

## 2018-03-27 NOTE — DISCHARGE INSTRUCTIONS
Follow up appointments:    -- Follow-up with pulmonology transplant clinic, Ame Chow on as scheduled Friday, March 30th and subsequently with Dr. Macias as scheduled on Tuesday, April 3rd.    -- Primary provider ideally within a few weeks.  You are scheduled to see Dr. Hunt, on Dr. Vu's team, on Tuesday, April 10th at 1:00 pm. If you like to see if there's an earlier appointment, please call [phone].    Address  [facility]                          [address] [place] [address]  Phone   [phone]

## 2018-03-27 NOTE — CARE CONFERENCE
Acute Rehab Care Conference/Team Rounds      Type: Team Rounds    Present: Dr. Luigi Fernandes, Rufina Flaherty SW, Kecia Blue patient, Tenisha Brady OT, Cammy Bryan PT, SEBAS Cronin      Discharge Barriers/Treatment/Education    Rehab Diagnosis: lung transplant    Active Medical Co-morbidities/Prognosis: raynauds, dermatomyositis, ILD    Safety: Uses call light to make needs known. Independent in room without device     Pain: Denies pain or discomfort    Medications, Skin, Tubes/Lines:  will manage medication and keeps transplant book updated, Transplant class 3/27 @ 1130. Incisions healing, scabbed areas and left JOSHUA. No tubes or lines.    Swallowing/Nutrition:    Bowel/Bladder: Continent of bladder and bowel. LBM 3/27/18    Psychosocial: Pt resides with her . Goal to return home with continued family support. Team working towards a safe d/c plan.     ADLs/IADLs: Patient has met all OT goals. Patient is independent in the room; independent with bathing, dressing grooming, toileting, functional transfers and household distance mobility. Patient continues with overall decreased activity tolerance, requiring rest breaks but able to self monitor. Patient demonstrates understanding of EC/WS for return to ADLs/ IADLs. Equipment needs; raised toilet seat and shower chair. Pt's  will plan to purchase prior to discharge.     Mobility: Pt is making good progress and appears ready for d/c on 3/28. Pt is now I with ambulation, no ad, but continues to demonstrate decreased functional strength and endurance. Pt is able to ambulate 560 ft x 1, 240 ft x 1, with seated rest break, 12 stairs (6 inch) Ashlyn with light support on rails. Pt will benefit from continued use of incentive spirometer, LE strengthening HEP and walking program and OP Pulmonary rehab. The patient is demonstrating good awareness of energy conservation.   Pt's  plans to purchase a 4ww for use at [employer] for longer distance  ambulation.     Cognition/Language:Patient's cognition/ language all intact; WFL     Community Re-Entry: Pt's  may purchase a 4ww for longer distance walking in the community.     Transportation: Would recommend that pt has assist for driving due to activity tolerance and decreased strength; car transfer not a barrier.     Decision maker: self    Plan of Care and goals reviewed and updated.    Discharge Plan/Recommendations    Fall Precautions: continue    Patient/Family input to goals: feel comfortable with discharge. Very appreciative for rehab care.     Overall plan for the patient: has made rapid progress. On track for discharge home tomorrow. Continue outpatient pulmonary rehab.       Utilization Review and Continued Stay Justification    Medical Necessity Criteria:    For any criteria that is not met, please document reason and plan for discharge, transfer, or modification of plan of care to address.    Requires intensive rehabilitation program to treat functional deficits?: Yes    Requires 3x per week or greater involvement of rehabilitation physician to oversee rehabilitation program?: Yes    Requires rehabilitation nursing interventions?: Yes    Patient is making functional progress?: Yes    There is a potential for additional functional progress? Yes    Patient is participating in therapy 3 hours per day a minimum of 5 days per week or 15 hours per week in 7 day period?:Yes    Has discharge needs that require coordinated discharge planning approach?:Yes      Final Physician Sign off    Statement of Approval: I agree with all the recommendations detailed in this document.     Patient Goals     OT target date for goal attainment: 03/28/18  OT Frequency: daily,  minutes  OT goal: upper body bathing: Modified independent  OT goal: lower body bathing: Modified independent  OT Goal: transfer: Independent  OT goal: toilet transfer/toileting: Independent, using adaptive equipment  OT goal: perform  "aerobic activity with stable cardiovascular response: continuous activity, 20 minutes  OT goal 1: patient will be able to complete a tub transfer independently with use of shower chair     PT target date for goal attainment: 03/28/18  PT Frequency: 2x/day for a total of  mins  PT goal: bed mobility: Independent, Within precautions  PT goal: transfers: Independent, Within precautions  PT goal: gait: Independent, Greater than 200 feet, Within precautions  PT goal: stairs: Independent, 4 stairs  PT goal: perform aerobic activity with stable cardiovascular response: continuous activity, 10 minutes, NuStep     PT goal 1: Pt will be Ind with home walking program to be done at [facility] for continue progress between therapies.  PT Goal 2: Pt will demonstrate Ind sit<>stand from 18\" surface with no UE support  PT Goal 3: Pt will be able to maintain amb with no AD for 180 sec avg at least .8 m/s        Goal: Medical Management: Patient will verbalize understanding of transplant needs and will demonstrate ability to direct cares and manage needs as indicated prior to time of d/c.          Patient/Family Goal: Bladder: Patient will demonstrate adeqate bladder management  prior to time of discharge.       Patient/Family Goal: Medication Management: Patient will demonstrate ability to manage medications prior to d/c. Pt's  also helping pt with meds.        Goal: Skin Integrity: Patient will verbalize prevention tactics and have awareness of risk factors and will advocate for self prior to time of d/c.    "

## 2018-03-27 NOTE — PLAN OF CARE
Problem: Patient Care Overview  Goal: Plan of Care/Patient Progress Review  Physical Therapy Discharge Summary    Reason for therapy discharge:    Discharged to [facility] [place] with OP Pulmonary Rehab    Progress towards therapy goal(s). See goals on Care Plan in [facility] electronic health record for goal details.  Goals met    Therapy recommendation(s):    Continued therapy is recommended.  Rationale/Recommendations:  Pt is now IND with all basic mobility, and MOD I with 4WW for community distance ambulation. She is safe to d/c to [facility] [place] with family, and recommending ongoing Pulm rehab after d/c for continued progressive endurance training. .

## 2018-03-27 NOTE — PLAN OF CARE
Problem: Goal/Outcome  Goal: Goal Outcome Summary  Outcome: Improving  FOCUS/GOAL  Bowel management, Bladder management, Medical management and Mobility    ASSESSMENT, INTERVENTIONS AND CONTINUING PLAN FOR GOAL:  Pt's vitals stable. Tachycardia per baseline past few days. Chest incisions and chest tube sites, healing and JOSHUA. Pt using incentive spirometer and aerobika independently. Continent of bowel and bladder. Declined bowel meds this morning.  Independent in room and managing own cares. Pt and her  keeping transplant lab book up to date. Medication card also updated today with Valcyte dose of 450mg starting today. PLC Transplant Class at 1130 this morning with pt and her . Care conference this morning with plan to discharge as soon as possible tomorrow morning.

## 2018-03-27 NOTE — PROGRESS NOTES
PM&R Daily Note:    57-year-old status post bilateral lung transplant 3/1/18 for interstitial lung disease and pulmonary hypertension.  Initially admitted 2/21 increasing work of breathing ultimately required ECMO and bridge until transplant 3/1.  Numerous posttransplant challenges as summarized in discharge summary.  After relative stabilization she transferred to inpatient acute rehab 3/24 for comprehensive cares.    Formal care conference held today. please see separate document in Plan of Care tab for full details. Briefly On track for discharge home tomorrow. Continue outpatient pulmonary rehab.   She will be staying locally [facility].    Did well off Seroquel - stop.  Talked with hospitalist Dr. Carlisle. He'll reconcile / Rx all meds needed.    Immunosuppression continues with mycophenolate, tacrolimus, prednisone  Blood pressure heart rates well controlled with metoprolol.  She is off historical sildenafil previously for pulmonary hypertension  Infectious prophylaxis with Bactrim and valganciclovir.  Dose of valganciclovir reduced at 450 mg daily because of creatinine clearance      Vitals:    03/26/18 1000 03/26/18 1657 03/27/18 0842 03/27/18 0915   BP:  111/80 129/85    BP Location:  Right arm     Pulse:  96 108 110   Resp: 20 18     Temp:  96.5  F (35.8  C)     TempSrc:  Oral     SpO2:  100%  100%   Weight:       Height:         Alert pleasant bright affect fluent speech and language  Breathing easy, no cough or wheeze.     40 minutes spent today, 25 minutes in direct patient/ interaction including care conference, remainder in coordination of care.

## 2018-03-27 NOTE — PLAN OF CARE
Problem: Goal/Outcome  Goal: Goal Outcome Summary  Outcome: No Change  FOCUS/GOAL  Bowel management, Bladder management and Medical management    ASSESSMENT, INTERVENTIONS AND CONTINUING PLAN FOR GOAL:  Pt a&ox4. Denied pain. I in room with no AD. Denied SOB or chest pain. Runs tachy at times-asymptomatic and appears to be baseline for pt. Reports being continent of b&b this shift on the toilet, reports 2 soft bms, declined stool softners at bedtime. Using transplant book correctly- and her manage labs, vitals and weight in book. Anticipate transplant medication education tomorrow, cc and discharge to [facility] Wednesday. Changed Seroquel PRN instead of scheduled, pt stated she thinks she doesn't need it, but will call if she has difficulty sleeping.

## 2018-03-27 NOTE — PROGRESS NOTES
[facility], [place]   Internal Medicine Daily Note          Assessment and Plan:   Kecia Blue is a 58 y/o female w/PMHx significant for dermatomyositis (on immunosuppression), seronegative RA, ILD, and pulmonary hypertension who was admitted for emergent lung transplant w/u on 2/21 for increased SOB and hypoxia. Required V-A ECMO for right heart failure on 2/27. Now s/p BSLT on 3/1 with ECMO decannulation on 3/3. Post-op course c/b hemodynamic instability requiring pressors and iFlolan, transitioned to Revatio now d/c'd. Right sided thoracentesis 3/22. Continues to make good progress overall.  Transferred to ARU on 3/24 for continued Rehab            S/p bilateral sequential lung transplant (3/1/18) for acute hypoxic/hypercapneic respiratory failure 2/2 ILD:  Post-op Pulm HTN 2/2 ILD:   Right sided pleural effusion, Resolved:  Adequate graft function. Weaned off iFlolan and PO Revatio as RV has returned to normal function. Extubated 3/6 and has remained stable on RA with minimal DESHPANDE. Surveillance bronchoscopy 3/13 with bilateral anastomoses intact and patent, thick yellow secretions around right anastomosis, suctioned and removed. Right-sided pleural effusion noted on imaging. S/p thoracentesis ~ 1.5 liters removed on 3/22.    - Pleural cultures NGTD, however neutrophil dominate (anticipate lymphocyte dominate so close to time of transplant). Continue to follow closely.    Continue aggressive pulmonary toilet with acapella/incentive spirometry q 1hr while awake.                      Continue 3-drug immunosuppression:  - Tacrolimus 5.5mg BID. Target goal 10-15. Tacrolimus drug levels every Mon/Thurs ( Tc levels on 3/26 at 14.4)  - MMF 1500 g BID   - Prednisone 12.5mg qAM / 10mg qPM. Steroid taper below:  Date AM (mg) PM (mg)   3/22/18 12.5 10   4/26/18 10 10   5/24/18 10 7.5   6/22/18 7.5 7.5   7/20/18 7.5 5   8/24/18 5 5   9/21/18 5 2.5       Prophylaxis:   PJP: Bactrim  CMV:  vangancyclovir  Fungal: Nystatin     Donor Recipient Intervention   CMV status  Pos   Pos  valgancyclovir POD #8-90   EBV status Pos Pos Immune    HSV status N/A  Pos Immune       Infectious disease:  SIRS, Resolved:  No known infectious disease history. Post-operative SIRS likely a response from multiple procedures (VA ECMO cannulation and decannulation) and transplant surgery. Remains afebrile and without leukocytosis. Negative recipient bronch culture from time of transplant.  Donor culture at OSH grew MRSA, Strep pneumoniae, Moraxella catarrhalis, and Haemophilus species. Donor culture at [facility] grew MRSA. S/p 2-week course of vancomycin and pip/tazo.        RADHA, Resolved:  Volume overload, Resolved:  Suspect secondary to meds and diuresis. Serum Cr+ and BLE edema now back to WNL. LE edema likely 2/2 low albumin state. Improved with diuretics.   -  Was on Bumex 1 mg PO daily which was discontinued up on discharge  Appears euvolemic   Renal functions stable Cr.1.18 on 3/26       LUE DVT, Provoked:  Noted on UE ultrasound on 3/7. On coumadin   - Warfarin, pharm to dose.  Therapeutic INR   - INR goal 2-3. Will need 3-months of anticoagulation. End date being 6/6      Acute anemia:  Hgb with slow drift to 7's. Received transfusion 3/8 and 3/15 with appropriate response. No evidence of bleeding. Likely 2/2 multiple lab draws, bone marrow suppressive medications. Stable hgb low 9s.  Cbc M TH  Stable Hgb at 8.9 on 3/26    Hypomagnesemia:    Mg oxide 400 mg daily           Consulting teams: Internal Medicine   Code status: Full   DVT Prophylaxis: On warfarin ( Therapeutic)   Gastric prophylaxis: PPI  Diet: Regular adult diet   Disposition: Discharge to [facility] tomorrow ( 3/28)      Patient seen and examined with RN   All medication reconciliation will be done and Internal Medicine will sign off       Interval History:   Progress notes in the last 24 hrs by MDT team has been reviewed.  This is the first time I am  "meeting with patient . His H&P and progress of events has been reviewed.  Patient feels well this morning. Up and sitting on her bed.  at bed side  Patient hopes to discharge to [facility] tomorrow.  Denies fevers or chills  No SOB/ Chest pain  Eating and drinking well  Pleasant mood            Review of Systems:   A comprehensive review of systems was performed and found to be negative except: Those that are outlined in interval history              Medications:   I have reviewed this patient's current medications which are outlined in the \"current medication\" section of EPIC             Physical Exam:   Vitals were reviewed  Temp: 96.5  F (35.8  C) Temp src: Oral BP: 129/85 Pulse: 110   Resp: 18 SpO2: 100 % O2 Device: None (Room air)    Constitutional:   awake, alert, cooperative, no apparent distress, and appears stated age     Lungs:   No increased work of breathing, good air exchange, clear to auscultation bilaterally, no crackles or wheezing     Cardiovascular:   Normal apical impulse, regular rate and rhythm, normal S1 and S2, no S3 or S4, and no murmur noted     Abdomen:   No scars, normal bowel sounds, soft, non-distended, non-tender, no masses palpated, no hepatosplenomegally     Musculoskeletal:   There is no redness, warmth, or swelling of the joints.  Full range of motion noted.  Motor strength is 5 out of 5 all extremities bilaterally.  Tone is normal.     Neurologic:   Awake, alert, oriented to name, place and time.  Cranial nerves II-XII are grossly intact.              Data:     Most recent labs have been evaluated and relevant labs are outlined below :  BMP    Recent Labs  Lab 03/26/18 0612 03/24/18  0535 03/23/18  0605 03/22/18  0630    137 137 137   POTASSIUM 5.2 5.3 5.2 4.8   CHLORIDE 104 103 105 106   CHELSEY 9.0 8.6 8.5 8.4*   CO2 28 24 24 21   BUN 34* 34* 30 32*   CR 1.18* 1.15* 1.00 1.03   * 131* 110* 137*     CBC    Recent Labs  Lab 03/26/18 0612 03/24/18  0535 " 03/23/18  0605 03/22/18  0630   WBC 4.5 4.1 5.0 5.1   RBC 2.85* 2.61* 3.15* 2.99*   HGB 8.9* 8.2* 9.9* 9.3*   HCT 28.5* 26.5* 32.3* 30.5*    102* 103* 102*   MCH 31.2 31.4 31.4 31.1   MCHC 31.2* 30.9* 30.7* 30.5*   RDW 19.5* 20.3* 20.6* 20.8*   * 400 358 345     INR    Recent Labs  Lab 03/27/18  0706 03/26/18  0612 03/25/18  0704 03/24/18  0535   INR 2.43* 2.80* 2.98* 2.23*     LFTs    Recent Labs  Lab 03/24/18  0535 03/23/18  0605 03/22/18  0630 03/21/18  0529   ALT 29 28 24 26   PROTTOTAL  --   --   --  5.6*   ALBUMIN 2.2* 2.4* 2.3* 2.5*      PANCNo lab results found in last 7 days.            Case d/w  Primary team     Dr KADE Carlisle MD  Hospitalist ( Internal medicine)  Pager: [phone]

## 2018-03-28 VITALS
BODY MASS INDEX: 20.49 KG/M2 | HEIGHT: 64 IN | TEMPERATURE: 97.5 F | HEART RATE: 103 BPM | OXYGEN SATURATION: 98 % | RESPIRATION RATE: 18 BRPM | SYSTOLIC BLOOD PRESSURE: 128 MMHG | DIASTOLIC BLOOD PRESSURE: 80 MMHG | WEIGHT: 120 LBS

## 2018-03-28 LAB — INR PPP: 2.38 (ref 0.86–1.14)

## 2018-03-28 PROCEDURE — 36415 COLL VENOUS BLD VENIPUNCTURE: CPT | Performed by: INTERNAL MEDICINE

## 2018-03-28 PROCEDURE — 85610 PROTHROMBIN TIME: CPT | Performed by: INTERNAL MEDICINE

## 2018-03-28 PROCEDURE — 25000125 ZZHC RX 250: Performed by: INTERNAL MEDICINE

## 2018-03-28 PROCEDURE — 25000132 ZZH RX MED GY IP 250 OP 250 PS 637: Performed by: INTERNAL MEDICINE

## 2018-03-28 PROCEDURE — 25000131 ZZH RX MED GY IP 250 OP 636 PS 637: Performed by: INTERNAL MEDICINE

## 2018-03-28 PROCEDURE — 25000132 ZZH RX MED GY IP 250 OP 250 PS 637: Performed by: PHYSICAL MEDICINE & REHABILITATION

## 2018-03-28 RX ORDER — VALGANCICLOVIR 450 MG/1
450 TABLET, FILM COATED ORAL DAILY
Qty: 30 TABLET | Refills: 1 | Status: SHIPPED | OUTPATIENT
Start: 2018-03-28 | End: 2018-04-05

## 2018-03-28 RX ORDER — WARFARIN SODIUM 1 MG/1
4 TABLET ORAL DAILY
Qty: 100 TABLET | Refills: 0 | Status: SHIPPED | OUTPATIENT
Start: 2018-03-28 | End: 2018-04-03

## 2018-03-28 RX ORDER — WARFARIN SODIUM 4 MG/1
4 TABLET ORAL
Status: DISCONTINUED | OUTPATIENT
Start: 2018-03-28 | End: 2018-03-28 | Stop reason: HOSPADM

## 2018-03-28 RX ORDER — ALBUTEROL SULFATE 90 UG/1
2 AEROSOL, METERED RESPIRATORY (INHALATION) 4 TIMES DAILY
Qty: 1 INHALER | Refills: 0 | Status: SHIPPED | OUTPATIENT
Start: 2018-03-28 | End: 2018-03-30

## 2018-03-28 RX ADMIN — TACROLIMUS 5.5 MG: 5 CAPSULE ORAL at 09:04

## 2018-03-28 RX ADMIN — VALGANCICLOVIR 450 MG: 450 TABLET, FILM COATED ORAL at 09:04

## 2018-03-28 RX ADMIN — METOPROLOL TARTRATE 25 MG: 25 TABLET, FILM COATED ORAL at 09:05

## 2018-03-28 RX ADMIN — SULFAMETHOXAZOLE AND TRIMETHOPRIM 1 TABLET: 400; 80 TABLET ORAL at 09:04

## 2018-03-28 RX ADMIN — MYCOPHENOLATE MOFETIL 1500 MG: 250 CAPSULE ORAL at 09:01

## 2018-03-28 RX ADMIN — LEVALBUTEROL TARTRATE 2 PUFF: 45 AEROSOL, METERED ORAL at 09:05

## 2018-03-28 RX ADMIN — PANTOPRAZOLE SODIUM 40 MG: 40 TABLET, DELAYED RELEASE ORAL at 09:04

## 2018-03-28 RX ADMIN — PREDNISONE 12.5 MG: 2.5 TABLET ORAL at 09:05

## 2018-03-28 RX ADMIN — NYSTATIN 1000000 UNITS: 500000 SUSPENSION ORAL at 09:05

## 2018-03-28 NOTE — PLAN OF CARE
Problem: Goal/Outcome  Goal: Goal Outcome Summary  Outcome: Improving  FOCUS/GOAL  Medical management    ASSESSMENT, INTERVENTIONS AND CONTINUING PLAN FOR GOAL:  Patient reported she did not sleep well, she said the room was too dry. No c/o pain. Plan to d/c to home today.

## 2018-03-28 NOTE — PLAN OF CARE
Problem: Goal/Outcome  Goal: Goal Outcome Summary  Outcome: Adequate for Discharge Date Met: 03/28/18  FOCUS/GOAL  Discharge planning    ASSESSMENT, INTERVENTIONS AND CONTINUING PLAN FOR GOAL:  Pt's vitals stable per baseline. Chest incision and chest tube sites healing well. No s/sx of infection. Pt independent in room mobility.  in room and helping pt. They reviewed medication card during med pass this morning. Discharge meds and instructions reviewed with pt and her . Discharged from unit per wheelchair, assisted by FATMATA out of unit at noon. Declined to have lunch or take her vitamins/supplements scheduled for noon saying she will take it when she gets to [facility].

## 2018-03-28 NOTE — PROGRESS NOTES
[facility]  [facility]  March 30, 2018         Assessment and Plan:   Mrs. Kecia Blue is a 58 y/o female with h/o dermatomyositis) seronegative RA, ILD and pulmonary hypertension who was admitted for emergent lung transplant workup on 2/21. She progressed to V-A ECMO for right heart failure on 2/27 and subsequently received a BSLT on 3/1/18. Post operative course complicated by right-sided pleural effusion s/p thoracentesis ~ 1.5 liters removed.     Next surveillance bronchoscopy: in the next few weeks  Coordinator/MD: IMMANUEL/AL     1. S/p bilateral lung transplant: doing very well, improved exercise tolerance, no dyspnea above what she would expect with activity. Cough last night after eating, likely some acid reflux. Will start Pulm Rehab on 4/6. DSA 3/1 and CMV 3/26 negative. CXR reviewed by me today demonstrates stable right effusion, decreased in bibasilar opacities. PFTs were her first post transplant. No acute issues.    - Continue immunosuppresion including mycophenolate 1500 mg BID, tacrolimus (goal 10-15) and prednisone taper  - Current prophylaxis with Bactrim, Valcyte 450 mg daily (adjusted for renal function--CMV D+/R+) and nystatin  - Encourage ongoing use of IS and Aerobika     2. Hypomagnesemia: secondary to CNI use. Mg of 1.8 today.   - Mg oxide 400 mg daily    3. Provoked LUE DVT: on ultrasound on 3/7 demonstrating occlusive thrombus in the left subclavian vein, axillary vein, basilic vein in the upper arm, cephalic vein near junction with the subclavian vein and cephalic vein from mid arm to the wrist. On warfarin, goal INR 2-3. INR pending for today  - Needs to get set up with AC clinic, referral today  - Will need 3 months of AC--given extent of clot, consider US of arm prior to discontinuation    4. High risk donor: will need 3 and 12 months labs.    5. Dermatomyositis: with likely econdary Raynauds (per patient not officially diagnosed). She has  pronounced purple discoloration of the digits on her left hand and right index finger. Cold to the touch. Also notes this occurs in her toes. Was on Imuran, tacrolimus and prednisone prior to transplant. Not on a CCB. Does have a rheumatologist at home, but would like to touch base with one while in [place].   - Referral to Rheumatology today    6. HTN: BP excellent.  - Continue metoprolol 25 mg BID    RTC: as scheduled  Influenza and other vaccinations:   Annual dermatology visit:    I have reviewed the patient's vitals, labs, CXR and lung function testing with the patient and her . I have reviewed and adjusted all medications with the patient and answered all questions.     Ame Chow PA-C  Pulmonary, Allergy, Critical Care and Sleep Medicine        Interval History:   Noticed some pain in her chest since discharge, notes it's pressure, likely related to stretching. Also notes a little bit of a lame cough last night, a lot of clearly her throat. No production. No shortness of breath, working on trying to breath/adjust her breathing. No fever, chills or headaches. No nausea, vomiting or cramps. Stools are small, hasn't been taking any bowel meds. Feels her diet is okay, working on water intake. Sleep wasn't great last night          Review of Systems:   Please see HPI, otherwise the complete 10 point ROS is negative.           Past Medical and Surgical History:     Past Medical History:   Diagnosis Date     Antisynthetase syndrome (H) 2014     Chronic cough      Dermatomyositis (H)      Dysplasia of cervix, low grade (ESTRADA 1)      ILD (interstitial lung disease) (H)      Osteopenia      Pulmonary hypertension      Raynaud's disease      Seronegative rheumatoid arthritis (H)      Past Surgical History:   Procedure Laterality Date     BRONCHOSCOPY FLEXIBLE N/A 3/13/2018    Procedure: BRONCHOSCOPY FLEXIBLE;  Flexible Bronchoscopy ;  Surgeon: Gissell Sanchez MD;  Location:  GI     ENT SURGERY       tonsillectomy as a child     INSERT EXTRACORPORAL MEMBRANE OXYGENATOR ADULT (OUTSIDE OR) N/A 2/27/2018    Procedure: INSERT EXTRACORPORAL MEMBRANE OXYGENATOR ADULT (OUTSIDE OR);  INSERT EXTRACORPORAL MEMBRANE OXYGENATOR ADULT (OUTSIDE OR) ;  Surgeon: Toby Hernandez MD;  Location: [facility]     no prior surgery       REMOVE EXTRACORPORAL MEMBRANE OXYGENATOR ADULT N/A 3/3/2018    Procedure: REMOVE EXTRACORPORAL MEMBRANE OXYGENATOR ADULT;  Removal of Right Femoral Venous and Right Axillary Arterial Extracorporeal Membrane Oxygenator;  Surgeon: Toby Hernandez MD;  Location[facility]     TRANSPLANT LUNG RECIPIENT SINGLE X2 Bilateral 3/1/2018    Procedure: TRANSPLANT LUNG RECIPIENT SINGLE X2;  Median Sternotomy, Extracorporeal Membrane Oxygenator, bilateral sequential lung transplant;  Surgeon: Toby Hernandez MD;  Location: [facility]           Family History:     Family History   Problem Relation Age of Onset     Hypertension Mother      Arthritis Mother      Pancreatic Cancer Father      DIABETES Father             Social History:     Social History     Social History     Marital status:      Spouse name: N/A     Number of children: N/A     Years of education: N/A     Occupational History     Not on file.     Social History Main Topics     Smoking status: Never Smoker     Smokeless tobacco: Never Used     Alcohol use 0.6 oz/week     1 Glasses of wine per week      Comment: 1 glass 3 times weekly     Drug use: No     Sexual activity: Not on file     Other Topics Concern     Parent/Sibling W/ Cabg, Mi Or Angioplasty Before 65f 55m? No     Social History Narrative            Medications:     Current Outpatient Prescriptions   Medication     levalbuterol (XOPENEX HFA) 45 MCG/ACT Inhaler     senna-docusate (SENOKOT-S;PERICOLACE) 8.6-50 MG per tablet     tacrolimus (GENERIC EQUIVALENT) 0.5 MG capsule     tacrolimus (GENERIC EQUIVALENT) 1 MG capsule     valGANciclovir (VALCYTE) 450 MG tablet      warfarin (COUMADIN) 1 MG tablet     calcium-vitamin D (CALTRATE) 600-400 MG-UNIT per tablet     magnesium oxide (MAG-OX) 400 MG tablet     metoprolol tartrate (LOPRESSOR) 25 MG tablet     pantoprazole (PROTONIX) 40 MG EC tablet     polyethylene glycol (MIRALAX/GLYCOLAX) Packet     sulfamethoxazole-trimethoprim (BACTRIM/SEPTRA) 400-80 MG per tablet     multivitamin, therapeutic with minerals (THERA-VIT-M) TABS tablet     nystatin (MYCOSTATIN) 759674 UNIT/ML suspension     predniSONE (DELTASONE) 5 MG tablet     tacrolimus (GENERIC EQUIVALENT) 0.5 MG capsule     mycophenolate (GENERIC EQUIVALENT) 250 MG capsule     [DISCONTINUED] levalbuterol (XOPENEX HFA) 45 MCG/ACT Inhaler     order for DME     No current facility-administered medications for this visit.             Physical Exam:   BP (!) 131/92 (BP Location: Right arm, Patient Position: Chair, Cuff Size: Adult Small)  Pulse 102  Resp 20    GENERAL: alert, NAD  HEENT: NCAT, EOMI, no scleral icterus, oral mucosa moist and without lesions  Neck: no cervical or supraclavicular adenopathy  Lungs: good air flow, few scattered bibasilar crackles and crackles in the left apex with decreased breath sounds in right base  CV: RRR, S1S2, no murmurs noted  Abdomen: normoactive BS, soft, non tender and no organomegaly  Lymph: no edema  Neuro: AAO X 3, CN 2-12 grossly intact  Psychiatric: normal affect, good eye contact  Skin: no rash, jaundice or lesions on limited exam         Data:   All laboratory and imaging data reviewed.      Recent Results (from the past 168 hour(s))   Basic metabolic panel    Collection Time: 03/24/18  5:35 AM   Result Value Ref Range    Sodium 137 133 - 144 mmol/L    Potassium 5.3 3.4 - 5.3 mmol/L    Chloride 103 94 - 109 mmol/L    Carbon Dioxide 24 20 - 32 mmol/L    Anion Gap 10 3 - 14 mmol/L    Glucose 131 (H) 70 - 99 mg/dL    Urea Nitrogen 34 (H) 7 - 30 mg/dL    Creatinine 1.15 (H) 0.52 - 1.04 mg/dL    GFR Estimate 49 (L) >60 mL/min/1.7m2    GFR  Estimate If Black 59 (L) >60 mL/min/1.7m2    Calcium 8.6 8.5 - 10.1 mg/dL   CBC with platelets    Collection Time: 03/24/18  5:35 AM   Result Value Ref Range    WBC 4.1 4.0 - 11.0 10e9/L    RBC Count 2.61 (L) 3.8 - 5.2 10e12/L    Hemoglobin 8.2 (L) 11.7 - 15.7 g/dL    Hematocrit 26.5 (L) 35.0 - 47.0 %     (H) 78 - 100 fl    MCH 31.4 26.5 - 33.0 pg    MCHC 30.9 (L) 31.5 - 36.5 g/dL    RDW 20.3 (H) 10.0 - 15.0 %    Platelet Count 400 150 - 450 10e9/L   Magnesium    Collection Time: 03/24/18  5:35 AM   Result Value Ref Range    Magnesium 1.9 1.6 - 2.3 mg/dL   Phosphorus    Collection Time: 03/24/18  5:35 AM   Result Value Ref Range    Phosphorus 3.6 2.5 - 4.5 mg/dL   Albumin level    Collection Time: 03/24/18  5:35 AM   Result Value Ref Range    Albumin 2.2 (L) 3.4 - 5.0 g/dL   ALT    Collection Time: 03/24/18  5:35 AM   Result Value Ref Range    ALT 29 0 - 50 U/L   INR    Collection Time: 03/24/18  5:35 AM   Result Value Ref Range    INR 2.23 (H) 0.86 - 1.14   Clostridium difficile toxin B PCR    Collection Time: 03/24/18 10:22 AM   Result Value Ref Range    Specimen Description Feces     C Diff Toxin B PCR Negative NEG^Negative   Lactic acid level STAT for sepsis protocol    Collection Time: 03/24/18  5:09 PM   Result Value Ref Range    Lactate for Sepsis Protocol 1.3 0.4 - 1.9 mmol/L   INR    Collection Time: 03/25/18  7:04 AM   Result Value Ref Range    INR 2.98 (H) 0.86 - 1.14   Lactic acid level STAT for sepsis protocol    Collection Time: 03/25/18  4:57 PM   Result Value Ref Range    Lactate for Sepsis Protocol 1.8 0.4 - 1.9 mmol/L   Tacrolimus level    Collection Time: 03/26/18  6:12 AM   Result Value Ref Range    Tacrolimus Last Dose Not Provided     Tacrolimus Level 14.4 5.0 - 15.0 ug/L   INR    Collection Time: 03/26/18  6:12 AM   Result Value Ref Range    INR 2.80 (H) 0.86 - 1.14   Basic metabolic panel    Collection Time: 03/26/18  6:12 AM   Result Value Ref Range    Sodium 140 133 - 144 mmol/L     Potassium 5.2 3.4 - 5.3 mmol/L    Chloride 104 94 - 109 mmol/L    Carbon Dioxide 28 20 - 32 mmol/L    Anion Gap 8 3 - 14 mmol/L    Glucose 128 (H) 70 - 99 mg/dL    Urea Nitrogen 34 (H) 7 - 30 mg/dL    Creatinine 1.18 (H) 0.52 - 1.04 mg/dL    GFR Estimate 48 (L) >60 mL/min/1.7m2    GFR Estimate If Black 57 (L) >60 mL/min/1.7m2    Calcium 9.0 8.5 - 10.1 mg/dL   CBC with platelets    Collection Time: 03/26/18  6:12 AM   Result Value Ref Range    WBC 4.5 4.0 - 11.0 10e9/L    RBC Count 2.85 (L) 3.8 - 5.2 10e12/L    Hemoglobin 8.9 (L) 11.7 - 15.7 g/dL    Hematocrit 28.5 (L) 35.0 - 47.0 %     78 - 100 fl    MCH 31.2 26.5 - 33.0 pg    MCHC 31.2 (L) 31.5 - 36.5 g/dL    RDW 19.5 (H) 10.0 - 15.0 %    Platelet Count 461 (H) 150 - 450 10e9/L   Magnesium    Collection Time: 03/26/18  6:12 AM   Result Value Ref Range    Magnesium 1.8 1.6 - 2.3 mg/dL   CMV DNA quantification    Collection Time: 03/26/18  6:12 AM   Result Value Ref Range    CMV DNA Quantitation Specimen EDTA PLASMA     CMV Quant IU/mL CMV DNA Not Detected CMVND^CMV DNA Not Detected [IU]/mL    Log IU/mL of CMVQNT Not Calculated <2.1 [Log_IU]/mL   INR    Collection Time: 03/27/18  7:06 AM   Result Value Ref Range    INR 2.43 (H) 0.86 - 1.14   INR    Collection Time: 03/28/18  6:51 AM   Result Value Ref Range    INR 2.38 (H) 0.86 - 1.14   Basic metabolic panel    Collection Time: 03/30/18  8:25 AM   Result Value Ref Range    Sodium 136 133 - 144 mmol/L    Potassium 4.8 3.4 - 5.3 mmol/L    Chloride 100 94 - 109 mmol/L    Carbon Dioxide 27 20 - 32 mmol/L    Anion Gap 8 3 - 14 mmol/L    Glucose 111 (H) 70 - 99 mg/dL    Urea Nitrogen 32 (H) 7 - 30 mg/dL    Creatinine 1.05 (H) 0.52 - 1.04 mg/dL    GFR Estimate 54 (L) >60 mL/min/1.7m2    GFR Estimate If Black 66 >60 mL/min/1.7m2    Calcium 9.2 8.5 - 10.1 mg/dL   Magnesium    Collection Time: 03/30/18  8:25 AM   Result Value Ref Range    Magnesium 1.8 1.6 - 2.3 mg/dL   CBC with platelets    Collection Time: 03/30/18   8:25 AM   Result Value Ref Range    WBC 10.7 4.0 - 11.0 10e9/L    RBC Count 2.86 (L) 3.8 - 5.2 10e12/L    Hemoglobin 9.3 (L) 11.7 - 15.7 g/dL    Hematocrit 29.2 (L) 35.0 - 47.0 %     (H) 78 - 100 fl    MCH 32.5 26.5 - 33.0 pg    MCHC 31.8 31.5 - 36.5 g/dL    RDW 18.7 (H) 10.0 - 15.0 %    Platelet Count 573 (H) 150 - 450 10e9/L   General PFT Lab (Please always keep checked)    Collection Time: 03/30/18  8:37 AM   Result Value Ref Range    FVC-Pred 3.25 L    FVC-Pre 1.34 L    FVC-%Pred-Pre 41 %    FEV1-Pre 1.08 L    FEV1-%Pred-Pre 41 %    FEV1FVC-Pred 79 %    FEV1FVC-Pre 81 %    FEFMax-Pred 6.45 L/sec    FEFMax-Pre 3.10 L/sec    FEFMax-%Pred-Pre 48 %    FEF2575-Pred 2.46 L/sec    FEF2575-Pre 1.00 L/sec    PKP8941-%Pred-Pre 40 %    ExpTime-Pre 6.48 sec    FIFMax-Pre 2.54 L/sec    FEV1FEV6-Pred 81 %    FEV1FEV6-Pre 81 %     PFT interpretation:  Maneuver: valid, did not meet full ATS guidelines  Normal ratio, decreased FEV1 and FVC  The decrease in FVC may represent restrictive physiology.  Lung volumes would be necessary to determine.

## 2018-03-28 NOTE — PHARMACY-ANTICOAGULATION SERVICE
Clinical Pharmacy- Warfarin Discharge Note  This patient is currently on warfarin for the treatment of DVT.  INR Goal= 2-3  Warfarin PTA Regimen: Started inpatient.    Anticoagulation Dose History     Recent Dosing and Labs Latest Ref Rng & Units 3/22/2018 3/23/2018 3/24/2018 3/25/2018 3/26/2018 3/27/2018 3/28/2018    Warfarin 0.5 mg - - - - 0.5 mg - - -    Warfarin 1 mg - - - - - 1 mg - -    Warfarin 4 mg - - - - - - 4 mg -    Warfarin 5 mg - 10 mg - 5 mg - - - -    Warfarin 7.5 mg - - 7.5 mg - - - - -    INR 0.86 - 1.14 1.25(H) 1.61(H) 2.23(H) 2.98(H) 2.80(H) 2.43(H) 2.38(H)        Vitamin K doses administered during the last 7 days: none  FFP administered during the last 7 days: none    Recommend discharging the patient on a warfarin regimen of 4 mg tonight with a prescription for warfarin 2mg tablets.      The patient should have an INR checked Thursday, 3/29/18.    Irene Palumbo, JoseD, BCPS

## 2018-03-28 NOTE — PLAN OF CARE
Problem: Goal/Outcome  Goal: Goal Outcome Summary  FOCUS/GOAL  Bowel management and Medication management    ASSESSMENT, INTERVENTIONS AND CONTINUING PLAN FOR GOAL:  Pt A&Ox4, able to make needs known. Knowledgeable about medications. Tolerating regular/thin diet, able to take pills whole. Pt independent in room. Continent of bowel and bladder. LBM: today per pt, declined scheduled bowel medications. Contact isolation maintained. Pt will discharge tomorrow pending INR draw for warfarin to be filled.

## 2018-03-28 NOTE — DISCHARGE SUMMARY
[facility]   Discharge Summary     Date Admitted: 3/24/2018  Date Discharge: 3/28/2018  Discharge Disposition: home    Discharge Diagnosis:   1. Bilateral lung transplant 3/1/2018  2. Interstitial lung disease with dermatomyositis  3. Left upper extremity deep vein thrombosis, on warfarin outlined 3 months  4. Anemia, multifactorial, postoperative blood loss but also anemia of chronic disease    Brief History of Presenting Illness and Hospital Course:  This is a 57 year old female admitted to the acute rehabilitation unit on 3/24/2018 for rehabilitation following longer hospitalization with respiratory failure and lung transplant.  Please see acute hospital discharge for details of that portion of her stay.  Very pleased with the functional improvements Kecia has shown since admitting to acute rehab.  She advanced rapidly to up independent without adaptive equipment.  Her endurance and energy much improved.  She is able to discharge after just short stay and will continue with outpatient pulmonary rehab.  Medically, no particular issues came up during her stay.  Able to discontinue Seroquel which is used in the acute hospital with some earlier agitation.  She continues with immunosuppression and antimicrobial management.    Discharge Medications:   Kecia Blue   Home Medication Instructions LIBBY:[id number]    Printed on:03/27/18 1927   Medication Information                      calcium-vitamin D (CALTRATE) 600-400 MG-UNIT per tablet  Take 1 tablet by mouth 2 times daily (with meals)             levalbuterol (XOPENEX HFA) 45 MCG/ACT Inhaler  Inhale 2 puffs into the lungs 4 times daily             magnesium oxide (MAG-OX) 400 MG tablet  Take 1 tablet (400 mg) by mouth daily             metoprolol tartrate (LOPRESSOR) 25 MG tablet  Take 1 tablet (25 mg) by mouth 2 times daily             multivitamin, therapeutic with minerals (THERA-VIT-M) TABS  tablet  Take 1 tablet by mouth daily             mycophenolate (GENERIC EQUIVALENT) 250 MG capsule  Take 6 capsules (1,500 mg) by mouth 2 times daily             nystatin (MYCOSTATIN) 072029 UNIT/ML suspension  Take 1 mL (100,000 Units) by mouth 4 times daily 100,000 unit/ ml, take 4- 6 ml by mouth 4 times a day.             order for DME  Equipment being ordered: Oxygen 5 liters at rest, 15 liters with exertion.  Needs portability.  Please provide 2 10-liter concentrators for in the home             pantoprazole (PROTONIX) 40 MG EC tablet  Take 1 tablet (40 mg) by mouth every morning             polyethylene glycol (MIRALAX/GLYCOLAX) Packet  Take 17 g by mouth 2 times daily             predniSONE (DELTASONE) 5 MG tablet  Date AM (mg) PM (mg)  3/22/18 12.5 10  4/26/18 10 10  5/24/18 10 7.5  6/22/18 7.5 7.5  7/20/18 7.5 5  8/24/18 5 5  9/21/18 5 2.5             senna-docusate (SENOKOT-S;PERICOLACE) 8.6-50 MG per tablet  Take 2 tablets by mouth At Bedtime             sulfamethoxazole-trimethoprim (BACTRIM/SEPTRA) 400-80 MG per tablet  1 tablet by Oral or NG Tube route daily             tacrolimus (GENERIC EQUIVALENT) 0.5 MG capsule  Take 11 capsules (5.5 mg) by mouth 2 times daily             valGANciclovir (VALCYTE) 450 MG tablet  Take 1 tablet (450 mg) by mouth daily               Discharge Instructions:    Active Diet Order      Combination Diet Regular Diet Adult; Thin Liquids (water, ice chips, juice, milk, gelatin, ice cream, etc)      Diet   Activity: May be up independent without adaptive equipment    Follow up Appointments:  This Friday with pulmonology MERRICK Chow.  Primary provider Dr. Vu within a month.

## 2018-03-29 ENCOUNTER — TELEPHONE (OUTPATIENT)
Dept: TRANSPLANT | Facility: CLINIC | Age: 58
End: 2018-03-29

## 2018-03-29 LAB
FUNGUS SPEC CULT: NORMAL
FUNGUS SPEC CULT: NORMAL
Lab: NORMAL
Lab: NORMAL
SPECIMEN SOURCE: NORMAL
SPECIMEN SOURCE: NORMAL

## 2018-03-30 ENCOUNTER — ANTICOAGULATION THERAPY VISIT (OUTPATIENT)
Dept: ANTICOAGULATION | Facility: CLINIC | Age: 58
End: 2018-03-30

## 2018-03-30 ENCOUNTER — OFFICE VISIT (OUTPATIENT)
Dept: PULMONOLOGY | Facility: CLINIC | Age: 58
End: 2018-03-30
Attending: PHYSICIAN ASSISTANT
Payer: COMMERCIAL

## 2018-03-30 ENCOUNTER — RADIANT APPOINTMENT (OUTPATIENT)
Dept: GENERAL RADIOLOGY | Facility: CLINIC | Age: 58
End: 2018-03-30
Attending: PHYSICIAN ASSISTANT
Payer: COMMERCIAL

## 2018-03-30 VITALS — DIASTOLIC BLOOD PRESSURE: 92 MMHG | SYSTOLIC BLOOD PRESSURE: 131 MMHG | HEART RATE: 102 BPM | RESPIRATION RATE: 20 BRPM

## 2018-03-30 DIAGNOSIS — Z94.2 LUNG TRANSPLANT RECIPIENT (H): ICD-10-CM

## 2018-03-30 DIAGNOSIS — Z94.2 S/P LUNG TRANSPLANT (H): ICD-10-CM

## 2018-03-30 DIAGNOSIS — K59.09 OTHER CONSTIPATION: ICD-10-CM

## 2018-03-30 DIAGNOSIS — Z94.2 LUNG REPLACED BY TRANSPLANT (H): ICD-10-CM

## 2018-03-30 DIAGNOSIS — I82.409 DEEP VEIN THROMBOSIS (DVT) (H): ICD-10-CM

## 2018-03-30 DIAGNOSIS — Z94.2 S/P LUNG TRANSPLANT (H): Primary | ICD-10-CM

## 2018-03-30 DIAGNOSIS — Z94.2 LUNG TRANSPLANT RECIPIENT (H): Primary | ICD-10-CM

## 2018-03-30 LAB
ANION GAP SERPL CALCULATED.3IONS-SCNC: 8 MMOL/L (ref 3–14)
BUN SERPL-MCNC: 32 MG/DL (ref 7–30)
CALCIUM SERPL-MCNC: 9.2 MG/DL (ref 8.5–10.1)
CHLORIDE SERPL-SCNC: 100 MMOL/L (ref 94–109)
CO2 SERPL-SCNC: 27 MMOL/L (ref 20–32)
CREAT SERPL-MCNC: 1.05 MG/DL (ref 0.52–1.04)
ERYTHROCYTE [DISTWIDTH] IN BLOOD BY AUTOMATED COUNT: 18.7 % (ref 10–15)
GFR SERPL CREATININE-BSD FRML MDRD: 54 ML/MIN/1.7M2
GLUCOSE SERPL-MCNC: 111 MG/DL (ref 70–99)
HCT VFR BLD AUTO: 29.2 % (ref 35–47)
HGB BLD-MCNC: 9.3 G/DL (ref 11.7–15.7)
INR PPP: 1.85 (ref 0.86–1.14)
MAGNESIUM SERPL-MCNC: 1.8 MG/DL (ref 1.6–2.3)
MCH RBC QN AUTO: 32.5 PG (ref 26.5–33)
MCHC RBC AUTO-ENTMCNC: 31.8 G/DL (ref 31.5–36.5)
MCV RBC AUTO: 102 FL (ref 78–100)
PLATELET # BLD AUTO: 573 10E9/L (ref 150–450)
POTASSIUM SERPL-SCNC: 4.8 MMOL/L (ref 3.4–5.3)
RBC # BLD AUTO: 2.86 10E12/L (ref 3.8–5.2)
SODIUM SERPL-SCNC: 136 MMOL/L (ref 133–144)
TACROLIMUS BLD-MCNC: 10.8 UG/L (ref 5–15)
TME LAST DOSE: NORMAL H
WBC # BLD AUTO: 10.7 10E9/L (ref 4–11)

## 2018-03-30 PROCEDURE — G0463 HOSPITAL OUTPT CLINIC VISIT: HCPCS | Mod: ZF

## 2018-03-30 PROCEDURE — 83735 ASSAY OF MAGNESIUM: CPT | Performed by: PHYSICIAN ASSISTANT

## 2018-03-30 PROCEDURE — 85027 COMPLETE CBC AUTOMATED: CPT | Performed by: PHYSICIAN ASSISTANT

## 2018-03-30 PROCEDURE — 80048 BASIC METABOLIC PNL TOTAL CA: CPT | Performed by: PHYSICIAN ASSISTANT

## 2018-03-30 PROCEDURE — 80197 ASSAY OF TACROLIMUS: CPT | Performed by: PHYSICIAN ASSISTANT

## 2018-03-30 PROCEDURE — 85610 PROTHROMBIN TIME: CPT | Performed by: PHYSICIAN ASSISTANT

## 2018-03-30 PROCEDURE — 36415 COLL VENOUS BLD VENIPUNCTURE: CPT | Performed by: PHYSICIAN ASSISTANT

## 2018-03-30 RX ORDER — TACROLIMUS 1 MG/1
1 CAPSULE ORAL 2 TIMES DAILY
Qty: 120 CAPSULE | Refills: 3 | Status: SHIPPED | OUTPATIENT
Start: 2018-03-30 | End: 2018-04-03

## 2018-03-30 RX ORDER — LEVALBUTEROL TARTRATE 45 UG/1
2 AEROSOL, METERED ORAL EVERY 6 HOURS PRN
Qty: 1 INHALER | Refills: 1 | COMMUNITY
Start: 2018-03-30 | End: 2018-05-21

## 2018-03-30 RX ORDER — TACROLIMUS 0.5 MG/1
0.5 CAPSULE ORAL 2 TIMES DAILY
Qty: 60 CAPSULE | Refills: 11 | OUTPATIENT
Start: 2018-03-30

## 2018-03-30 RX ORDER — AMOXICILLIN 250 MG
2 CAPSULE ORAL 2 TIMES DAILY PRN
Qty: 100 TABLET | Refills: 0 | COMMUNITY
Start: 2018-03-30 | End: 2018-05-21

## 2018-03-30 RX ORDER — TACROLIMUS 0.5 MG/1
CAPSULE ORAL
Qty: 120 CAPSULE | Refills: 3 | Status: SHIPPED | OUTPATIENT
Start: 2018-03-30 | End: 2018-04-05

## 2018-03-30 RX ORDER — TACROLIMUS 5 MG/1
5 CAPSULE ORAL 2 TIMES DAILY
Qty: 60 CAPSULE | Refills: 11 | OUTPATIENT
Start: 2018-03-30

## 2018-03-30 ASSESSMENT — PAIN SCALES - GENERAL: PAINLEVEL: MILD PAIN (3)

## 2018-03-30 NOTE — PATIENT INSTRUCTIONS
Patient Instructions  1. Make sure you're getting 64 oz of fluid (preferably water) a day. Very important for your kidneys   2. You don't have to take your inhaler if you don't fee like you need it  3. Take Mirilax in the mornings  4. Take your magnesium in the afternoon   5. Coumadin/INR clinic will call you today  6. I'll call you later today with your prograf/tacrolimus level   7. You can take tylenol for pain, no Ibuprofen because it can hurt your kidneys   8. Keep doing the incentive spirometry at home    9. Rheumatology referral   10. OK to move your prednisone at 5 pm  11. INR draw today    Next transplant clinic appointment: next Tuesday with CXR, labs and PFTs  Next lab draw: next Tuesday  ~~~~~~~~~~~~~~~~~~~~~~~~~    Thoracic Transplant Office phone [phone], fax [phone]  Office Hours 8:30 - 5:00     For after-hours urgent issues, please dial [phone], and ask to speak with the Thoracic Transplant Coordinator  On-Call, pager [phone].  --------------------  To expedite your medication refill(s), please contact your pharmacy and have them fax a refill request to: [phone].   *Please allow 3 business days for routine medication refills.  *Please allow 5 business days for controlled substance medication refills.    **For Diabetic medications and supplies refill(s), please contact your pharmacy and have them  Contact your Endocrine team.  --------------------  For scheduling appointments call [facility] :  [phone]. For lab appointments call [phone] or [facility].  --------------------  Please Note: If you are active on [employer], all future test results will be sent by [employer] message only, and will no longer be called to patient. You may also receive communication directly from your physician.

## 2018-03-30 NOTE — MR AVS SNAPSHOT
After Visit Summary   3/30/2018    Kecia Blue    MRN: [id number]           Patient Information     Date Of Birth          1960        Visit Information        Provider Department      3/30/2018 9:10 AM Ame Chow PA-C [facility]        Today's Diagnoses     S/P lung transplant (H)    -  1    Other constipation          Care Instructions      Patient Instructions  1. Make sure you're getting 64 oz of fluid (preferably water) a day. Very important for your kidneys   2. You don't have to take your inhaler if you don't fee like you need it  3. Take Mirilax in the mornings  4. Take your magnesium in the afternoon   5. Coumadin/INR clinic will call you today  6. I'll call you later today with your prograf/tacrolimus level   7. You can take tylenol for pain, no Ibuprofen because it can hurt your kidneys   8. Keep doing the incentive spirometry at home    9. Rheumatology referral   10. OK to move your prednisone at 5 pm  11. INR draw today    Next transplant clinic appointment: next Tuesday with CXR, labs and PFTs  Next lab draw: next Tuesday  ~~~~~~~~~~~~~~~~~~~~~~~~~    Thoracic Transplant Office phone [phone], fax [phone]  Office Hours 8:30 - 5:00     For after-hours urgent issues, please dial [phone], and ask to speak with the Thoracic Transplant Coordinator  On-Call, pager [phone].  --------------------  To expedite your medication refill(s), please contact your pharmacy and have them fax a refill request to: [phone].   *Please allow 3 business days for routine medication refills.  *Please allow 5 business days for controlled substance medication refills.    **For Diabetic medications and supplies refill(s), please contact your pharmacy and have them  Contact your Endocrine team.  --------------------  For scheduling appointments call [facility] :  [phone]. For lab appointments call [phone] or  [facility].  --------------------  Please Note: If you are active on [employer], all future test results will be sent by [employer] message only, and will no longer be called to patient. You may also receive communication directly from your physician.          Follow-ups after your visit        Additional Services     INR CLINIC REFERRAL       INR monitoring with goal INR 2-3            RHEUMATOLOGY REFERRAL       Your provider has referred you to: [facility]: Rheumatology Clinic [facility] [phone]   [email or web address]    Please be aware that coverage of these services is subject to the terms and limitations of your health insurance plan.  Call member services at your health plan with any benefit or coverage questions.      Please bring the following with you to your appointment:    (1) Any X-Rays, CTs or MRIs which have been performed.  Contact the facility where they were done to arrange for  prior to your scheduled appointment.    (2) List of current medications   (3) This referral request   (4) Any documents/labs given to you for this referral                  Your next 10 appointments already scheduled     Mar 30, 2018 10:15 AM CDT   Lab with  LAB    [facility] ([facility])    [address] [place] [id number] [phone]            Apr 03, 2018  7:45 AM CDT   Lab with  LAB   [facility] ([facility])    [address] [place] [id number] [phone]            Apr 03, 2018  8:00 AM CDT   (Arrive by 7:45 AM)   XR CHEST 2 VIEWS with UCXR1   [facility] Xray ([facility])    [address] [place] [id number] [phone]           Please bring a list of your current medicines to your exam. (Include vitamins, minerals and over-thecounter medicines.) Leave your valuables at home.  Tell your doctor if there is  a chance you may be pregnant.  You do not need to do anything special for this exam.            Apr 03, 2018  1:00 PM CDT   PFT VISIT with  PFL D   [facility] Pulmonary Function Testing ([facility])    [address] [place] [phone]   [phone]            Apr 03, 2018  1:40 PM CDT   (Arrive by 1:25 PM)   Return Lung Transplant with Lopez Macias MD   [facility] Solid Organ Transplant ([facility])    [address] [place] [address]   [phone]            Apr 05, 2018  8:15 AM CDT   Lab with  LAB   [facility] Lab ([facility])    [address] [place] [address]   [phone]            Apr 05, 2018  8:30 AM CDT   (Arrive by 8:15 AM)   XR CHEST 2 VIEWS with UCXR1   [facility] Center Xray ([facility])    [address] [place] [id number] [phone]           Please bring a list of your current medicines to your exam. (Include vitamins, minerals and over-thecounter medicines.) Leave your valuables at home.  Tell your doctor if there is a chance you may be pregnant.  You do not need to do anything special for this exam.            Apr 05, 2018  9:00 AM CDT   (Arrive by 8:45 AM)   Return Lung Transplant with Srinivas Mcelroy MD   [facility] Solid Organ Transplant ([facility])    [address] [place] [address]   [phone]            Apr 06, 2018  2:00 PM CDT   Pulmonary Eval with  Pulmonary Rehab 1   [facility] [place], Cardiac Rehabilitation ([facility])    [address]  [facility] [id number] [phone]            Apr 09, 2018  1:10 PM CDT   (Arrive by 12:55 PM)   Return Lung Transplant with Nicho Hood MD   [facility] Solid Organ Transplant ([facility])    [address] [facility]  3rd Floor  [facility] [address] [phone]              Who to contact     If you have questions or need follow up information about today's clinic visit or your schedule please contact [facility] directly at [phone].  Normal or non-critical lab and imaging results will be communicated to you by MyChart, letter or phone within 4 business days after the clinic has received the results. If you do not hear from us within 7 days, please contact the clinic through [employer] or phone. If you have a critical or abnormal lab result, we will notify you by phone as soon as possible.  Submit refill requests through [employer] or call your pharmacy and they will forward the refill request to us. Please allow 3 business days for your refill to be completed.          Additional Information About Your Visit        [employer] Information     [employer] gives you secure access to your electronic health record. If you see a primary care provider, you can also send messages to your care team and make appointments. If you have questions, please call your primary care clinic.  If you do not have a primary care provider, please call [phone] and they will assist you.        Care EveryWhere ID     This is your Care EveryWhere ID. This could be used by other organizations to access your [facility] medical records  [id number]        Your Vitals Were     Pulse Respirations                102 20           Blood Pressure from Last 3 Encounters:   03/30/18 (!) 131/92   03/28/18 128/80   03/24/18 114/78    Weight from Last 3 Encounters:   03/24/18 54.4 kg (120 lb)   03/24/18 53.3 kg (117 lb 9.6 oz)   02/20/18 51.5 kg (113 lb 9.6 oz)              We Performed the Following     INR CLINIC REFERRAL     INR     RHEUMATOLOGY REFERRAL          Today's Medication Changes          These changes are accurate as of 3/30/18 10:09 AM.  If you have any questions, ask  your nurse or doctor.               These medicines have changed or have updated prescriptions.        Dose/Directions    levalbuterol 45 MCG/ACT Inhaler   Commonly known as:  XOPENEX HFA   This may have changed:    - when to take this  - reasons to take this   Used for:  S/P lung transplant (H), Other constipation   Changed by:  Aem Chow PA-C        Dose:  2 puff   Inhale 2 puffs into the lungs every 6 hours as needed for shortness of breath / dyspnea or wheezing   Quantity:  1 Inhaler   Refills:  1       senna-docusate 8.6-50 MG per tablet   Commonly known as:  SENOKOT-S;PERICOLACE   This may have changed:    - when to take this  - reasons to take this   Used for:  Other constipation, S/P lung transplant (H)   Changed by:  Ame Chow PA-C        Dose:  2 tablet   Take 2 tablets by mouth 2 times daily as needed for constipation   Quantity:  100 tablet   Refills:  0       * tacrolimus 0.5 MG capsule   Commonly known as:  GENERIC EQUIVALENT   This may have changed:  Another medication with the same name was added. Make sure you understand how and when to take each.   Used for:  S/P lung transplant (H)   Changed by:  Ame Chow PA-C        Dose:  5.5 mg   Take 11 capsules (5.5 mg) by mouth 2 times daily   Quantity:  650 capsule   Refills:  1       * tacrolimus 0.5 MG capsule   Commonly known as:  GENERIC EQUIVALENT   This may have changed:  You were already taking a medication with the same name, and this prescription was added. Make sure you understand how and when to take each.   Used for:  S/P lung transplant (H), Other constipation   Changed by:  Ame Chow PA-C        Take one capsule twice/day   Quantity:  120 capsule   Refills:  3       * tacrolimus 1 MG capsule   Commonly known as:  GENERIC EQUIVALENT   This may have changed:  You were already taking a medication with the same name, and this prescription was added. Make sure you understand how and when to take  each.   Used for:  S/P lung transplant (H), Other constipation   Changed by:  Ame Chow PA-C        Dose:  1 mg   Take 1 capsule (1 mg) by mouth 2 times daily   Quantity:  120 capsule   Refills:  3       * Notice:  This list has 3 medication(s) that are the same as other medications prescribed for you. Read the directions carefully, and ask your doctor or other care provider to review them with you.      Stop taking these medicines if you haven't already. Please contact your care team if you have questions.     albuterol 108 (90 BASE) MCG/ACT Inhaler   Commonly known as:  PROAIR HFA/PROVENTIL HFA/VENTOLIN HFA   Stopped by:  Ame Chow PA-C                Where to get your medicines      These medications were sent to [facility] [address] [facility] [address] [place] [address] [address] [place] [address]    Hours:  TRANSPLANT PHONE NUMBER [phone] Phone:  [phone]     tacrolimus 0.5 MG capsule    tacrolimus 1 MG capsule                Primary Care Provider Office Phone # Fax #    Rosy Vu [phone]       [facility]  [address]  [facility] [address]        Equal Access to Services     ALBAN VALENCIA AH: Hadii patricia ku hadasho Soomaali, waaxda luqadaha, qaybta kaalmada adeegyada, morro kelley. So [facility] [phone].    ATENCIÓN: Si habla español, tiene a taylor disposición servicios gratuitos de asistencia lingüística. Llame al [phone].    We comply with applicable federal civil rights laws and [place] laws. We do not discriminate on the basis of race, color, national origin, age, disability, sex, sexual orientation, or gender identity.            Thank you!     Thank you for choosing [facility]  for your care. Our goal is always to provide you with excellent care. Hearing back from our patients is one way we can continue to improve our  services. Please take a few minutes to complete the written survey that you may receive in the mail after your visit with us. Thank you!             Your Updated Medication List - Protect others around you: Learn how to safely use, store and throw away your medicines at www.disposemymeds.org.          This list is accurate as of 3/30/18 10:09 AM.  Always use your most recent med list.                   Brand Name Dispense Instructions for use Diagnosis    calcium-vitamin D 600-400 MG-UNIT per tablet    CALTRATE    60 tablet    Take 1 tablet by mouth 2 times daily (with meals)    S/P lung transplant (H)       levalbuterol 45 MCG/ACT Inhaler    XOPENEX HFA    1 Inhaler    Inhale 2 puffs into the lungs every 6 hours as needed for shortness of breath / dyspnea or wheezing    S/P lung transplant (H), Other constipation       magnesium oxide 400 MG tablet    MAG-OX    30 tablet    Take 1 tablet (400 mg) by mouth daily    Hypomagnesemia       metoprolol tartrate 25 MG tablet    LOPRESSOR    60 tablet    Take 1 tablet (25 mg) by mouth 2 times daily    Paroxysmal atrial fibrillation (H)       multivitamin, therapeutic with minerals Tabs tablet     30 each    Take 1 tablet by mouth daily    Lung transplant recipient (H)       mycophenolate 250 MG capsule    GENERIC EQUIVALENT    200 capsule    Take 6 capsules (1,500 mg) by mouth 2 times daily    S/P lung transplant (H)       nystatin 711354 UNIT/ML suspension    MYCOSTATIN    280 mL    Take 1 mL (100,000 Units) by mouth 4 times daily 100,000 unit/ ml, take 4- 6 ml by mouth 4 times a day.    Lung transplant recipient (H)       order for DME     1 Device    Equipment being ordered: Oxygen 5 liters at rest, 15 liters with exertion.  Needs portability.  Please provide 2 10-liter concentrators for in the home    ILD (interstitial lung disease) (H), Hypoxia       pantoprazole 40 MG EC tablet    PROTONIX    30 tablet    Take 1 tablet (40 mg) by mouth every morning     Gastroesophageal reflux disease without esophagitis       polyethylene glycol Packet    MIRALAX/GLYCOLAX    60 packet    Take 17 g by mouth 2 times daily    Other constipation       predniSONE 5 MG tablet    DELTASONE    300 tablet    DateAM (mg)PM (mg) 3/22/1812.510 4/26/181010 5/24/18107.5 6/22/187.57.5 7/20/187.55 8/24/1855 9/21/1852.5    S/P lung transplant (H)       senna-docusate 8.6-50 MG per tablet    SENOKOT-S;PERICOLACE    100 tablet    Take 2 tablets by mouth 2 times daily as needed for constipation    Other constipation, S/P lung transplant (H)       sulfamethoxazole-trimethoprim 400-80 MG per tablet    BACTRIM/SEPTRA    30 tablet    1 tablet by Oral or NG Tube route daily    Organ transplant candidate, Lung disease, interstitial (H), Abnormal chest CT, Hypotension, unspecified hypotension type, Acute on chronic respiratory failure with hypoxia (H), ILD (interstitial lung disease) (H)       * tacrolimus 0.5 MG capsule    GENERIC EQUIVALENT    650 capsule    Take 11 capsules (5.5 mg) by mouth 2 times daily    S/P lung transplant (H)       * tacrolimus 0.5 MG capsule    GENERIC EQUIVALENT    120 capsule    Take one capsule twice/day    S/P lung transplant (H), Other constipation       * tacrolimus 1 MG capsule    GENERIC EQUIVALENT    120 capsule    Take 1 capsule (1 mg) by mouth 2 times daily    S/P lung transplant (H), Other constipation       valGANciclovir 450 MG tablet    VALCYTE    30 tablet    Take 1 tablet (450 mg) by mouth daily    S/P lung transplant (H)       warfarin 1 MG tablet    COUMADIN    100 tablet    Take 4 tablets (4 mg) by mouth daily    Lung transplant recipient (H), Paroxysmal atrial fibrillation (H)       * Notice:  This list has 3 medication(s) that are the same as other medications prescribed for you. Read the directions carefully, and ask your doctor or other care provider to review them with you.

## 2018-03-30 NOTE — PROGRESS NOTES
ANTICOAGULATION FOLLOW-UP CLINIC VISIT    Patient Name:  Kecia Blue  Date:  3/30/2018  Contact Type:  Telephone    SUBJECTIVE:     Patient Findings     Positives No Problem Findings    Comments Double Lung Transplant on 3/1/18.            OBJECTIVE    INR   Date Value Ref Range Status   03/30/2018 1.85 (H) 0.86 - 1.14 Final       ASSESSMENT / PLAN  INR assessment SUB    Recheck INR In: 4 DAYS    INR Location Clinic      Anticoagulation Summary as of 3/30/2018     INR goal 2.0-3.0   Today's INR 1.85!   Maintenance plan No maintenance plan   Full instructions 3/30: 6 mg; 3/31: 5 mg; 4/1: 5 mg; 4/2: 5 mg; Otherwise No maintenance plan   Next INR check 4/3/2018   Target end date     Indications   Lung transplant recipient (H) [Z94.2]  Deep vein thrombosis (DVT) (H) [I82.409] [I82.409]         Anticoagulation Episode Summary     INR check location Clinic Lab    Preferred lab     Send INR reminders to [facility] CLINIC    Comments Plan for 3 months of therapy- Provoked DVT. Welcome package sent . Patient staying at [facility] Verbal OK to speak with  and leave messages on Cell [phone]      Anticoagulation Care Providers     Provider Role Specialty Phone number    Ame Chow PA-C Responsible Pulmonary [phone]            See the Encounter Report to view Anticoagulation Flowsheet and Dosing Calendar (Go to Encounters tab in chart review, and find the Anticoagulation Therapy Visit)    Patient was discharged from the rehab  on 3/28/18. Instead of 4 mg daily she took 1mg on 3/28 the 7mgs on 3/29.  Bilateral Lung transplant on 3/1/18.  Patient had a provoked DVT will be on warfarin for 3 months.  Patient has 1mg warfarin tablets. Patient OK with using these for now    Ramin Hines [facility]

## 2018-03-30 NOTE — NURSING NOTE
Chief Complaint   Patient presents with     Transplant Evaluation     Patient is being seen for post lung tx follow up      Judith Dove CMA at 8:51 AM on 3/30/2018

## 2018-03-30 NOTE — NURSING NOTE
Transplant Coordinator Note    Reason for visit: Post lung transplant follow up visit   Coordinator: Present   Caregiver:  present    Health concerns addressed today:  1. Chest pain, heaviness   2. Small BMs  *mirilax today  3. Fingers are purple, she says she has raynauds  *rheumatology referral       Activity/rehab: pulmonary rehab starts on 4/6  Oxygen needs: RA  Pain management/RX: na  Diabetic management: na  Next Bronch due:   High risk donor:  Yes   CMV status: R+/D+  Valcyte stopped: at 3 month zachary, June 1st   DVT/PE: yes  AC/asa: coumadin  PJP prophylactic: bactrim  Other: she has raynauds's --fingers purple     Pt Education: medications (use/dose/side effects), how/when to call coordinator, frequency of labs, s/s of infection/rejection, call prior to starting any new medications, lab/vital sign book, prednisone taper  Health Maintenance:     Last colonoscopy:     Next colonoscopy due:     Dermatology: annually     Vaccinations this visit: na    Labs, CXR, PFTs reviewed with patient  Medication record reviewed and reconciled  Questions and concerns addressed    Patient Instructions  1. Make sure you're getting 64 oz of fluid (preferably water) a day. Very important for your kidneys   2. You don't have to take your inhaler if you don't fee like you need it  3. Take Mirilax in the mornings  4. Take your magnesium in the afternoon   5. Coumadin/INR clinic will call you today  6. I'll call you later today with your prograf/tacrolimus level   7. You can take tylenol for pain, no Ibuprofen because it can hurt your kidneys   8. Keep doing the incentive spirometry at home    9. Rheumatology referral   10. OK to move your prednisone at 5 pm  11. Get INR drawn today    Next transplant clinic appointment: next Tuesday with CXR, labs and PFTs  Next lab draw: next Tuesday      AVS printed at time of check out

## 2018-03-30 NOTE — PROGRESS NOTES
Sent welcome letter and information to :  [address] [facility] [place] [address] [phone]

## 2018-03-30 NOTE — LETTER
3/30/2018       RE: Kecia Blue  [address] [place] [address]     Dear Colleague,    Thank you for referring your patient, Kecia Blue, to the [facility] at [facility]. Please see a copy of my visit note below.    [facility]  March 30, 2018         Assessment and Plan:   Mrs. Kecia Blue is a 58 y/o female with h/o dermatomyositis) seronegative RA, ILD and pulmonary hypertension who was admitted for emergent lung transplant workup on 2/21. She progressed to V-A ECMO for right heart failure on 2/27 and subsequently received a BSLT on 3/1/18. Post operative course complicated by right-sided pleural effusion s/p thoracentesis ~ 1.5 liters removed.     Next surveillance bronchoscopy: in the next few weeks  Coordinator/MD: IMMANUEL/AL     1. S/p bilateral lung transplant: doing very well, improved exercise tolerance, no dyspnea above what she would expect with activity. Cough last night after eating, likely some acid reflux. Will start Pulm Rehab on 4/6. DSA 3/1 and CMV 3/26 negative. CXR reviewed by me today demonstrates stable right effusion, decreased in bibasilar opacities. PFTs were her first post transplant. No acute issues.    - Continue immunosuppresion including mycophenolate 1500 mg BID, tacrolimus (goal 10-15) and prednisone taper  - Current prophylaxis with Bactrim, Valcyte 450 mg daily (adjusted for renal function--CMV D+/R+) and nystatin  - Encourage ongoing use of IS and Aerobika     2. Hypomagnesemia: secondary to CNI use. Mg of 1.8 today.   - Mg oxide 400 mg daily    3. Provoked LUE DVT: on ultrasound on 3/7 demonstrating occlusive thrombus in the left subclavian vein, axillary vein, basilic vein in the upper arm, cephalic vein near junction with the subclavian vein and cephalic vein from mid arm to the wrist. On warfarin, goal INR 2-3. INR pending for  today  - Needs to get set up with AC clinic, referral today  - Will need 3 months of AC--given extent of clot, consider US of arm prior to discontinuation    4. High risk donor: will need 3 and 12 months labs.    5. Dermatomyositis: with likely econdary Raynauds (per patient not officially diagnosed). She has pronounced purple discoloration of the digits on her left hand and right index finger. Cold to the touch. Also notes this occurs in her toes. Was on Imuran, tacrolimus and prednisone prior to transplant. Not on a CCB. Does have a rheumatologist at home, but would like to touch base with one while in [place].   - Referral to Rheumatology today    6. HTN: BP excellent.  - Continue metoprolol 25 mg BID    RTC: as scheduled  Influenza and other vaccinations:   Annual dermatology visit:    I have reviewed the patient's vitals, labs, CXR and lung function testing with the patient and her . I have reviewed and adjusted all medications with the patient and answered all questions.     Ame Chow PA-C  Pulmonary, Allergy, Critical Care and Sleep Medicine        Interval History:   Noticed some pain in her chest since discharge, notes it's pressure, likely related to stretching. Also notes a little bit of a lame cough last night, a lot of clearly her throat. No production. No shortness of breath, working on trying to breath/adjust her breathing. No fever, chills or headaches. No nausea, vomiting or cramps. Stools are small, hasn't been taking any bowel meds. Feels her diet is okay, working on water intake. Sleep wasn't great last night          Review of Systems:   Please see HPI, otherwise the complete 10 point ROS is negative.           Past Medical and Surgical History:     Past Medical History:   Diagnosis Date     Antisynthetase syndrome (H) 2014     Chronic cough      Dermatomyositis (H)      Dysplasia of cervix, low grade (ESTRADA 1)      ILD (interstitial lung disease) (H)      Osteopenia      Pulmonary  hypertension      Raynaud's disease      Seronegative rheumatoid arthritis (H)      Past Surgical History:   Procedure Laterality Date     BRONCHOSCOPY FLEXIBLE N/A 3/13/2018    Procedure: BRONCHOSCOPY FLEXIBLE;  Flexible Bronchoscopy ;  Surgeon: Gissell Sanchez MD;  Location:  GI     ENT SURGERY      tonsillectomy as a child     INSERT EXTRACORPORAL MEMBRANE OXYGENATOR ADULT (OUTSIDE OR) N/A 2/27/2018    Procedure: INSERT EXTRACORPORAL MEMBRANE OXYGENATOR ADULT (OUTSIDE OR);  INSERT EXTRACORPORAL MEMBRANE OXYGENATOR ADULT (OUTSIDE OR) ;  Surgeon: Toby Hernandez MD;  Location:  OR     no prior surgery       REMOVE EXTRACORPORAL MEMBRANE OXYGENATOR ADULT N/A 3/3/2018    Procedure: REMOVE EXTRACORPORAL MEMBRANE OXYGENATOR ADULT;  Removal of Right Femoral Venous and Right Axillary Arterial Extracorporeal Membrane Oxygenator;  Surgeon: Toby Hernandez MD;  Location:  OR     TRANSPLANT LUNG RECIPIENT SINGLE X2 Bilateral 3/1/2018    Procedure: TRANSPLANT LUNG RECIPIENT SINGLE X2;  Median Sternotomy, Extracorporeal Membrane Oxygenator, bilateral sequential lung transplant;  Surgeon: Toby Hernandez MD;  [facility]           Family History:     Family History   Problem Relation Age of Onset     Hypertension Mother      Arthritis Mother      Pancreatic Cancer Father      DIABETES Father             Social History:     Social History     Social History     Marital status:      Spouse name: N/A     Number of children: N/A     Years of education: N/A     Occupational History     Not on file.     Social History Main Topics     Smoking status: Never Smoker     Smokeless tobacco: Never Used     Alcohol use 0.6 oz/week     1 Glasses of wine per week      Comment: 1 glass 3 times weekly     Drug use: No     Sexual activity: Not on file     Other Topics Concern     Parent/Sibling W/ Cabg, Mi Or Angioplasty Before 65f 55m? No     Social History Narrative            Medications:      Current Outpatient Prescriptions   Medication     levalbuterol (XOPENEX HFA) 45 MCG/ACT Inhaler     senna-docusate (SENOKOT-S;PERICOLACE) 8.6-50 MG per tablet     tacrolimus (GENERIC EQUIVALENT) 0.5 MG capsule     tacrolimus (GENERIC EQUIVALENT) 1 MG capsule     valGANciclovir (VALCYTE) 450 MG tablet     warfarin (COUMADIN) 1 MG tablet     calcium-vitamin D (CALTRATE) 600-400 MG-UNIT per tablet     magnesium oxide (MAG-OX) 400 MG tablet     metoprolol tartrate (LOPRESSOR) 25 MG tablet     pantoprazole (PROTONIX) 40 MG EC tablet     polyethylene glycol (MIRALAX/GLYCOLAX) Packet     sulfamethoxazole-trimethoprim (BACTRIM/SEPTRA) 400-80 MG per tablet     multivitamin, therapeutic with minerals (THERA-VIT-M) TABS tablet     nystatin (MYCOSTATIN) 472457 UNIT/ML suspension     predniSONE (DELTASONE) 5 MG tablet     tacrolimus (GENERIC EQUIVALENT) 0.5 MG capsule     mycophenolate (GENERIC EQUIVALENT) 250 MG capsule     [DISCONTINUED] levalbuterol (XOPENEX HFA) 45 MCG/ACT Inhaler     order for DME     No current facility-administered medications for this visit.             Physical Exam:   BP (!) 131/92 (BP Location: Right arm, Patient Position: Chair, Cuff Size: Adult Small)  Pulse 102  Resp 20    GENERAL: alert, NAD  HEENT: NCAT, EOMI, no scleral icterus, oral mucosa moist and without lesions  Neck: no cervical or supraclavicular adenopathy  Lungs: good air flow, few scattered bibasilar crackles and crackles in the left apex with decreased breath sounds in right base  CV: RRR, S1S2, no murmurs noted  Abdomen: normoactive BS, soft, non tender and no organomegaly  Lymph: no edema  Neuro: AAO X 3, CN 2-12 grossly intact  Psychiatric: normal affect, good eye contact  Skin: no rash, jaundice or lesions on limited exam         Data:   All laboratory and imaging data reviewed.      Recent Results (from the past 168 hour(s))   Basic metabolic panel    Collection Time: 03/24/18  5:35 AM   Result Value Ref Range    Sodium 137  133 - 144 mmol/L    Potassium 5.3 3.4 - 5.3 mmol/L    Chloride 103 94 - 109 mmol/L    Carbon Dioxide 24 20 - 32 mmol/L    Anion Gap 10 3 - 14 mmol/L    Glucose 131 (H) 70 - 99 mg/dL    Urea Nitrogen 34 (H) 7 - 30 mg/dL    Creatinine 1.15 (H) 0.52 - 1.04 mg/dL    GFR Estimate 49 (L) >60 mL/min/1.7m2    GFR Estimate If Black 59 (L) >60 mL/min/1.7m2    Calcium 8.6 8.5 - 10.1 mg/dL   CBC with platelets    Collection Time: 03/24/18  5:35 AM   Result Value Ref Range    WBC 4.1 4.0 - 11.0 10e9/L    RBC Count 2.61 (L) 3.8 - 5.2 10e12/L    Hemoglobin 8.2 (L) 11.7 - 15.7 g/dL    Hematocrit 26.5 (L) 35.0 - 47.0 %     (H) 78 - 100 fl    MCH 31.4 26.5 - 33.0 pg    MCHC 30.9 (L) 31.5 - 36.5 g/dL    RDW 20.3 (H) 10.0 - 15.0 %    Platelet Count 400 150 - 450 10e9/L   Magnesium    Collection Time: 03/24/18  5:35 AM   Result Value Ref Range    Magnesium 1.9 1.6 - 2.3 mg/dL   Phosphorus    Collection Time: 03/24/18  5:35 AM   Result Value Ref Range    Phosphorus 3.6 2.5 - 4.5 mg/dL   Albumin level    Collection Time: 03/24/18  5:35 AM   Result Value Ref Range    Albumin 2.2 (L) 3.4 - 5.0 g/dL   ALT    Collection Time: 03/24/18  5:35 AM   Result Value Ref Range    ALT 29 0 - 50 U/L   INR    Collection Time: 03/24/18  5:35 AM   Result Value Ref Range    INR 2.23 (H) 0.86 - 1.14   Clostridium difficile toxin B PCR    Collection Time: 03/24/18 10:22 AM   Result Value Ref Range    Specimen Description Feces     C Diff Toxin B PCR Negative NEG^Negative   Lactic acid level STAT for sepsis protocol    Collection Time: 03/24/18  5:09 PM   Result Value Ref Range    Lactate for Sepsis Protocol 1.3 0.4 - 1.9 mmol/L   INR    Collection Time: 03/25/18  7:04 AM   Result Value Ref Range    INR 2.98 (H) 0.86 - 1.14   Lactic acid level STAT for sepsis protocol    Collection Time: 03/25/18  4:57 PM   Result Value Ref Range    Lactate for Sepsis Protocol 1.8 0.4 - 1.9 mmol/L   Tacrolimus level    Collection Time: 03/26/18  6:12 AM   Result Value  Ref Range    Tacrolimus Last Dose Not Provided     Tacrolimus Level 14.4 5.0 - 15.0 ug/L   INR    Collection Time: 03/26/18  6:12 AM   Result Value Ref Range    INR 2.80 (H) 0.86 - 1.14   Basic metabolic panel    Collection Time: 03/26/18  6:12 AM   Result Value Ref Range    Sodium 140 133 - 144 mmol/L    Potassium 5.2 3.4 - 5.3 mmol/L    Chloride 104 94 - 109 mmol/L    Carbon Dioxide 28 20 - 32 mmol/L    Anion Gap 8 3 - 14 mmol/L    Glucose 128 (H) 70 - 99 mg/dL    Urea Nitrogen 34 (H) 7 - 30 mg/dL    Creatinine 1.18 (H) 0.52 - 1.04 mg/dL    GFR Estimate 48 (L) >60 mL/min/1.7m2    GFR Estimate If Black 57 (L) >60 mL/min/1.7m2    Calcium 9.0 8.5 - 10.1 mg/dL   CBC with platelets    Collection Time: 03/26/18  6:12 AM   Result Value Ref Range    WBC 4.5 4.0 - 11.0 10e9/L    RBC Count 2.85 (L) 3.8 - 5.2 10e12/L    Hemoglobin 8.9 (L) 11.7 - 15.7 g/dL    Hematocrit 28.5 (L) 35.0 - 47.0 %     78 - 100 fl    MCH 31.2 26.5 - 33.0 pg    MCHC 31.2 (L) 31.5 - 36.5 g/dL    RDW 19.5 (H) 10.0 - 15.0 %    Platelet Count 461 (H) 150 - 450 10e9/L   Magnesium    Collection Time: 03/26/18  6:12 AM   Result Value Ref Range    Magnesium 1.8 1.6 - 2.3 mg/dL   CMV DNA quantification    Collection Time: 03/26/18  6:12 AM   Result Value Ref Range    CMV DNA Quantitation Specimen EDTA PLASMA     CMV Quant IU/mL CMV DNA Not Detected CMVND^CMV DNA Not Detected [IU]/mL    Log IU/mL of CMVQNT Not Calculated <2.1 [Log_IU]/mL   INR    Collection Time: 03/27/18  7:06 AM   Result Value Ref Range    INR 2.43 (H) 0.86 - 1.14   INR    Collection Time: 03/28/18  6:51 AM   Result Value Ref Range    INR 2.38 (H) 0.86 - 1.14   Basic metabolic panel    Collection Time: 03/30/18  8:25 AM   Result Value Ref Range    Sodium 136 133 - 144 mmol/L    Potassium 4.8 3.4 - 5.3 mmol/L    Chloride 100 94 - 109 mmol/L    Carbon Dioxide 27 20 - 32 mmol/L    Anion Gap 8 3 - 14 mmol/L    Glucose 111 (H) 70 - 99 mg/dL    Urea Nitrogen 32 (H) 7 - 30 mg/dL     Creatinine 1.05 (H) 0.52 - 1.04 mg/dL    GFR Estimate 54 (L) >60 mL/min/1.7m2    GFR Estimate If Black 66 >60 mL/min/1.7m2    Calcium 9.2 8.5 - 10.1 mg/dL   Magnesium    Collection Time: 03/30/18  8:25 AM   Result Value Ref Range    Magnesium 1.8 1.6 - 2.3 mg/dL   CBC with platelets    Collection Time: 03/30/18  8:25 AM   Result Value Ref Range    WBC 10.7 4.0 - 11.0 10e9/L    RBC Count 2.86 (L) 3.8 - 5.2 10e12/L    Hemoglobin 9.3 (L) 11.7 - 15.7 g/dL    Hematocrit 29.2 (L) 35.0 - 47.0 %     (H) 78 - 100 fl    MCH 32.5 26.5 - 33.0 pg    MCHC 31.8 31.5 - 36.5 g/dL    RDW 18.7 (H) 10.0 - 15.0 %    Platelet Count 573 (H) 150 - 450 10e9/L   General PFT Lab (Please always keep checked)    Collection Time: 03/30/18  8:37 AM   Result Value Ref Range    FVC-Pred 3.25 L    FVC-Pre 1.34 L    FVC-%Pred-Pre 41 %    FEV1-Pre 1.08 L    FEV1-%Pred-Pre 41 %    FEV1FVC-Pred 79 %    FEV1FVC-Pre 81 %    FEFMax-Pred 6.45 L/sec    FEFMax-Pre 3.10 L/sec    FEFMax-%Pred-Pre 48 %    FEF2575-Pred 2.46 L/sec    FEF2575-Pre 1.00 L/sec    OYD6679-%Pred-Pre 40 %    ExpTime-Pre 6.48 sec    FIFMax-Pre 2.54 L/sec    FEV1FEV6-Pred 81 %    FEV1FEV6-Pre 81 %     PFT interpretation:  Maneuver: valid, did not meet full ATS guidelines  Normal ratio, decreased FEV1 and FVC  The decrease in FVC may represent restrictive physiology.  Lung volumes would be necessary to determine.    Again, thank you for allowing me to participate in the care of your patient.      Sincerely,    Ame Chow PA-C

## 2018-03-31 LAB
CMV DNA SPEC NAA+PROBE-ACNC: NORMAL [IU]/ML
CMV DNA SPEC NAA+PROBE-LOG#: NORMAL {LOG_IU}/ML
SPECIMEN SOURCE: NORMAL

## 2018-04-02 LAB
EXPTIME-PRE: 6.48 SEC
FEF2575-%PRED-PRE: 40 %
FEF2575-PRE: 1 L/SEC
FEF2575-PRED: 2.46 L/SEC
FEFMAX-%PRED-PRE: 48 %
FEFMAX-PRE: 3.1 L/SEC
FEFMAX-PRED: 6.45 L/SEC
FEV1-%PRED-PRE: 41 %
FEV1-PRE: 1.08 L
FEV1FEV6-PRE: 81 %
FEV1FEV6-PRED: 81 %
FEV1FVC-PRE: 81 %
FEV1FVC-PRED: 79 %
FIFMAX-PRE: 2.54 L/SEC
FVC-%PRED-PRE: 41 %
FVC-PRE: 1.34 L
FVC-PRED: 3.25 L

## 2018-04-03 ENCOUNTER — RESULTS ONLY (OUTPATIENT)
Dept: OTHER | Facility: CLINIC | Age: 58
End: 2018-04-03

## 2018-04-03 ENCOUNTER — APPOINTMENT (OUTPATIENT)
Dept: TRANSPLANT | Facility: CLINIC | Age: 58
End: 2018-04-03
Attending: INTERNAL MEDICINE
Payer: COMMERCIAL

## 2018-04-03 ENCOUNTER — TELEPHONE (OUTPATIENT)
Dept: PHARMACY | Facility: CLINIC | Age: 58
End: 2018-04-03

## 2018-04-03 ENCOUNTER — ANTICOAGULATION THERAPY VISIT (OUTPATIENT)
Dept: ANTICOAGULATION | Facility: CLINIC | Age: 58
End: 2018-04-03

## 2018-04-03 ENCOUNTER — RADIANT APPOINTMENT (OUTPATIENT)
Dept: GENERAL RADIOLOGY | Facility: CLINIC | Age: 58
End: 2018-04-03
Attending: PHYSICIAN ASSISTANT
Payer: COMMERCIAL

## 2018-04-03 VITALS
SYSTOLIC BLOOD PRESSURE: 126 MMHG | HEART RATE: 100 BPM | OXYGEN SATURATION: 95 % | WEIGHT: 124 LBS | RESPIRATION RATE: 16 BRPM | DIASTOLIC BLOOD PRESSURE: 89 MMHG | BODY MASS INDEX: 21.17 KG/M2 | HEIGHT: 64 IN | TEMPERATURE: 98.4 F

## 2018-04-03 DIAGNOSIS — Z94.2 LUNG REPLACED BY TRANSPLANT (H): ICD-10-CM

## 2018-04-03 DIAGNOSIS — Z79.899 ENCOUNTER FOR LONG-TERM (CURRENT) USE OF HIGH-RISK MEDICATION: Primary | ICD-10-CM

## 2018-04-03 DIAGNOSIS — Z94.2 LUNG TRANSPLANT RECIPIENT (H): Primary | ICD-10-CM

## 2018-04-03 DIAGNOSIS — Z94.2 LUNG TRANSPLANT RECIPIENT (H): ICD-10-CM

## 2018-04-03 DIAGNOSIS — J84.9 ILD (INTERSTITIAL LUNG DISEASE) (H): ICD-10-CM

## 2018-04-03 DIAGNOSIS — I82.409 DEEP VEIN THROMBOSIS (DVT) (H): ICD-10-CM

## 2018-04-03 DIAGNOSIS — I82.622 ACUTE DEEP VEIN THROMBOSIS (DVT) OF LEFT UPPER EXTREMITY, UNSPECIFIED VEIN (H): Primary | ICD-10-CM

## 2018-04-03 DIAGNOSIS — K59.09 OTHER CONSTIPATION: ICD-10-CM

## 2018-04-03 DIAGNOSIS — Z94.2 S/P LUNG TRANSPLANT (H): ICD-10-CM

## 2018-04-03 DIAGNOSIS — I82.622 ACUTE DEEP VEIN THROMBOSIS (DVT) OF LEFT UPPER EXTREMITY, UNSPECIFIED VEIN (H): ICD-10-CM

## 2018-04-03 DIAGNOSIS — I48.0 PAROXYSMAL ATRIAL FIBRILLATION (H): ICD-10-CM

## 2018-04-03 LAB
ANION GAP SERPL CALCULATED.3IONS-SCNC: 7 MMOL/L (ref 3–14)
BUN SERPL-MCNC: 26 MG/DL (ref 7–30)
CALCIUM SERPL-MCNC: 8.6 MG/DL (ref 8.5–10.1)
CHLORIDE SERPL-SCNC: 103 MMOL/L (ref 94–109)
CO2 SERPL-SCNC: 27 MMOL/L (ref 20–32)
CREAT SERPL-MCNC: 0.92 MG/DL (ref 0.52–1.04)
ERYTHROCYTE [DISTWIDTH] IN BLOOD BY AUTOMATED COUNT: 18.5 % (ref 10–15)
EXPTIME-PRE: 4.9 SEC
FEF2575-%PRED-PRE: 53 %
FEF2575-PRE: 1.31 L/SEC
FEF2575-PRED: 2.46 L/SEC
FEFMAX-%PRED-PRE: 45 %
FEFMAX-PRE: 2.91 L/SEC
FEFMAX-PRED: 6.45 L/SEC
FEV1-%PRED-PRE: 38 %
FEV1-PRE: 1.01 L
FEV1FEV6-PRE: 94 %
FEV1FEV6-PRED: 81 %
FEV1FVC-PRE: 92 %
FEV1FVC-PRED: 79 %
FIFMAX-PRE: 2.75 L/SEC
FUNGUS SPEC CULT: NORMAL
FVC-%PRED-PRE: 33 %
FVC-PRE: 1.1 L
FVC-PRED: 3.25 L
GFR SERPL CREATININE-BSD FRML MDRD: 63 ML/MIN/1.7M2
GLUCOSE SERPL-MCNC: 109 MG/DL (ref 70–99)
HCT VFR BLD AUTO: 29.9 % (ref 35–47)
HGB BLD-MCNC: 9.2 G/DL (ref 11.7–15.7)
INR PPP: 3.48 (ref 0.86–1.14)
MAGNESIUM SERPL-MCNC: 1.7 MG/DL (ref 1.6–2.3)
MCH RBC QN AUTO: 32.1 PG (ref 26.5–33)
MCHC RBC AUTO-ENTMCNC: 30.8 G/DL (ref 31.5–36.5)
MCV RBC AUTO: 104 FL (ref 78–100)
PLATELET # BLD AUTO: 574 10E9/L (ref 150–450)
POTASSIUM SERPL-SCNC: 4.3 MMOL/L (ref 3.4–5.3)
RBC # BLD AUTO: 2.87 10E12/L (ref 3.8–5.2)
SODIUM SERPL-SCNC: 137 MMOL/L (ref 133–144)
SPECIMEN SOURCE: NORMAL
TACROLIMUS BLD-MCNC: 12.9 UG/L (ref 5–15)
TME LAST DOSE: NORMAL H
WBC # BLD AUTO: 18.8 10E9/L (ref 4–11)

## 2018-04-03 PROCEDURE — 36415 COLL VENOUS BLD VENIPUNCTURE: CPT | Performed by: PHYSICIAN ASSISTANT

## 2018-04-03 PROCEDURE — 83735 ASSAY OF MAGNESIUM: CPT | Performed by: PHYSICIAN ASSISTANT

## 2018-04-03 PROCEDURE — 85610 PROTHROMBIN TIME: CPT | Performed by: PHYSICIAN ASSISTANT

## 2018-04-03 PROCEDURE — G0463 HOSPITAL OUTPT CLINIC VISIT: HCPCS | Mod: ZF

## 2018-04-03 PROCEDURE — 80048 BASIC METABOLIC PNL TOTAL CA: CPT | Performed by: PHYSICIAN ASSISTANT

## 2018-04-03 PROCEDURE — 86833 HLA CLASS II HIGH DEFIN QUAL: CPT | Performed by: INTERNAL MEDICINE

## 2018-04-03 PROCEDURE — 80197 ASSAY OF TACROLIMUS: CPT | Performed by: PHYSICIAN ASSISTANT

## 2018-04-03 PROCEDURE — 86832 HLA CLASS I HIGH DEFIN QUAL: CPT | Performed by: INTERNAL MEDICINE

## 2018-04-03 PROCEDURE — 85027 COMPLETE CBC AUTOMATED: CPT | Performed by: PHYSICIAN ASSISTANT

## 2018-04-03 RX ORDER — TACROLIMUS 1 MG/1
CAPSULE ORAL
Qty: 120 CAPSULE | Refills: 3 | COMMUNITY
Start: 2018-04-03 | End: 2018-04-05

## 2018-04-03 RX ORDER — WARFARIN SODIUM 1 MG/1
TABLET ORAL
Qty: 100 TABLET | Refills: 0 | COMMUNITY
Start: 2018-04-03 | End: 2018-04-30

## 2018-04-03 RX ORDER — POLYETHYLENE GLYCOL 3350 17 G/17G
17 POWDER, FOR SOLUTION ORAL 2 TIMES DAILY PRN
Qty: 60 PACKET | Refills: 0 | COMMUNITY
Start: 2018-04-03 | End: 2018-05-21

## 2018-04-03 ASSESSMENT — PAIN SCALES - GENERAL: PAINLEVEL: MODERATE PAIN (5)

## 2018-04-03 NOTE — PROGRESS NOTES
ANTICOAGULATION FOLLOW-UP CLINIC VISIT    Patient Name:  Kecia Blue  Date:  4/3/2018  Contact Type:  Telephone    SUBJECTIVE:     Patient Findings     Positives Initiation of therapy           OBJECTIVE    INR   Date Value Ref Range Status   04/03/2018 3.48 (H) 0.86 - 1.14 Final       ASSESSMENT / PLAN  INR assessment SUPRA    Recheck INR In: 2 DAYS    INR Location Clinic      Anticoagulation Summary as of 4/3/2018     INR goal 2.0-3.0   Today's INR 3.48!   Maintenance plan No maintenance plan   Full instructions 4/3: 2 mg; 4/4: 4 mg; Otherwise No maintenance plan   Next INR check 4/5/2018   Priority INR   Target end date     Indications   Lung transplant recipient (H) [Z94.2]  Deep vein thrombosis (DVT) (H) [I82.409] [I82.409]         Anticoagulation Episode Summary     INR check location Clinic Lab    Preferred lab     Send INR reminders to [facility]    Comments Plan for 3 months of therapy- Provoked DVT. Welcome package sent . Patient staying at [facility] Verbal OK to speak with  and leave messages on Cell [phone]      Anticoagulation Care Providers     Provider Role Specialty Phone number    Ame Chow PA-C Responsible Pulmonary [phone]            See the Encounter Report to view Anticoagulation Flowsheet and Dosing Calendar (Go to Encounters tab in chart review, and find the Anticoagulation Therapy Visit)    Spoke with Kecia.  She reports no changes in health, diet, medications.  She has a lab appointment on 4/5/18 and will check INR at that time.  Her INR went from 1.85 to 3.48 in 4 days, so cut warfarin dose.    Anju Meyers RN     Addendum:  Received an [employer] message from Evelyn in transplant.  Kecia is scheduled for a bronch on 4/10/18 and will need to bridge with lovenox.  Sent plan to Evelyn for approval.  Plan that was sent:  April 5:  Hold warfarin  April 6:  Hold warfarin, begin lovenox 60 mg in the PM  April 7:  Hold warfarin, lovenox 60 mg  in AM and PM  April 8:  Hold warfarin, lovenox 60 mg in AM and PM  April 9:  Hold warfarin and lovenox  April 10:  Bronch:  Kecia will ask physician who does bronch when it is safe to restart her warfarin and lovenox.  She will continue on lovenox until INR is therapeutic x 2 readings.  Cr cl:  57.2     Weight:  56.36 kg         Will wait for response from Evelyn before calling patient with above plan.  Anju Meyers RN

## 2018-04-03 NOTE — LETTER
4/3/2018      RE: Kecia Blue  [id number] [place]  [facility] [address]       Reason for visit:    Kecia Blue is a 57 year old female being seen today for RECHECK (S/P Lung TX)    Assessment and Plan:  Kecia Blue is a 56 y/o female 33 days s/p bilateral lung transplant with h/o dermatomyositis , seronegative RA, ILD and pulmonary hypertension. She was admitted for emergent lung transplant workup on 2/21. She progressed to V-A ECMO for right heart failure on 2/27 and subsequently received a BSLT on 3/1/18. Post operative course complicated by right-sided pleural effusion s/p thoracentesis ~ 1.5 liters removed. Also LUE DVT and anemia.     1. S/p lung transplant: Today, the patient is feeling well though her post-transplant PFTs are down slightly from last week. She reports a mild cough productive of clear sputum. She will begin pulmonary rehab on Friday (4/6). I review today's CXR with the patient and her  which was stable from one week ago. Continue current immunosuppression (mycophenolate 1500mg BID, tracrolimus - goal 10 to 15, and prednisone taper) and prophylaxis (Bactrim, Valcyte - CMV +/+, and nystatin). Today's reduction in PFTs may be related to the patients new chest pain. She will begin Tylenol 1000mg BID and repeat PFTs on Thursday (4/5).      2. Hypomagnesemia: Remains at the low end of normal today at 1.7. Continue current supplement.    3. Provoked LUE DVT: Found on ultrasound on 3/7 demonstrating occlusive thrombus in the left subclavian vein, axillary vein, basilic vein in the upper arm, cephalic vein near junction with the subclavian vein and cephalic vein from mid arm to the wrist. On warfarin, goal INR 2-3. INR of 3.48 today - above the therapeutic range for warfarin but not concerning at this time. Defer to coumadin clinic for dose adjustment.    4. Dermatomyositis: with likely secondary Raynauds (per patient not officially diagnosed). She has pronounced purple  discoloration of the digits on her left hand and right index finger. Cold to the touch. Also notes this occurs in her toes. Was on Imuran, tacrolimus and prednisone prior to transplant. Not on a CCB. Does have a rheumatologist at home, but would like to touch base with one while in [place].   - Referral to Rheumatology today    5. Hypertension: Blood pressure of 126/89 today - well managed with current metoprolol. Will continue metoprolol 25 mg BID.    6. Raynaud's Syndrome: Previously diagnosed per the patient. Explains cyanosis in the digits.     7. Health Care Maintenance: Today's labs were reviewed with the patient and her . Electrolyte levels are wnl. Creatine is improved and wnl. WBC is high and elevated from her last appointment. Hemoglobin is slightly low today but stable from her last visit.    This patient will be seen for follow up with Dr. Mcelroy in two days with PFTs, CXR and labs with DSA.    40 minutes face to face time with the patient, more than half spent in counseling and coordination of care regarding reduced PFTs and chest pain.    Lung TX HPI:    Kecia Blue is a 58 y/o female 33 days s/p bilateral lung transplant with h/o dermatomyositis , seronegative RA, ILD and pulmonary hypertension. She was admitted for emergent lung transplant workup on 2/21. She progressed to V-A ECMO for right heart failure on 2/27 and subsequently received a BSLT on 3/1/18. She was extubated 3/6. Post operative course complicated by right-sided pleural effusion s/p thoracentesis ~ 1.5 liters removed. Also LUE DVT and anemia.    Today, the patient is one week post-discharge and generally feeling well. She does report pain in her chest which was not present in the hospital. The pain comes and goes and is localized to the center of her chest. Partial symptom relief by Tylenol. Pain is worse in the morning and improves through the day. Pain is not exacerbated by walking or eating. No associated nausea or  SOB.     Breathing is comfortable at rest and similar to a week ago. Dyspnea on exertion with daily activities. She is able to walk the length of a block comfortably but uses an electric chair for longer distances. Exercise is limited by leg fatigue and breathing. She will begin pulmonary rehab on Friday. She reports a cough at baseline with an additional, new weak cough productive of a small amount of clear sputum. No hemoptysis. No fever, chills or night sweats.       Review of Systems.  CONST: Appetite is good.  ENT: No sinus/ear pain, sore throat. Small amt of clear rhinorrhea.   GI: No nausea, vomiting, or loose stools. No abdominal pain. Constipation relieved by senna.    A complete ROS was otherwise negative except as noted in the HPI.     Past Medical History:   Diagnosis Date     Antisynthetase syndrome (H) 2014     Chronic cough      Dermatomyositis (H)      Dysplasia of cervix, low grade (ESTRADA 1)      ILD (interstitial lung disease) (H)      Osteopenia      Pulmonary hypertension      Raynaud's disease      Seronegative rheumatoid arthritis (H)      Past Surgical History:   Procedure Laterality Date     BRONCHOSCOPY FLEXIBLE N/A 3/13/2018    Procedure: BRONCHOSCOPY FLEXIBLE;  Flexible Bronchoscopy ;  Surgeon: Gissell Sanchez MD;  Location:  GI     ENT SURGERY      tonsillectomy as a child     INSERT EXTRACORPORAL MEMBRANE OXYGENATOR ADULT (OUTSIDE OR) N/A 2/27/2018    Procedure: INSERT EXTRACORPORAL MEMBRANE OXYGENATOR ADULT (OUTSIDE OR);  INSERT EXTRACORPORAL MEMBRANE OXYGENATOR ADULT (OUTSIDE OR) ;  Surgeon: Toby Hernandez MD;  Location:  OR     no prior surgery       REMOVE EXTRACORPORAL MEMBRANE OXYGENATOR ADULT N/A 3/3/2018    Procedure: REMOVE EXTRACORPORAL MEMBRANE OXYGENATOR ADULT;  Removal of Right Femoral Venous and Right Axillary Arterial Extracorporeal Membrane Oxygenator;  Surgeon: Toby Hernandez MD;  Location: [facility] OR     TRANSPLANT LUNG RECIPIENT SINGLE X2  Bilateral 3/1/2018    Procedure: TRANSPLANT LUNG RECIPIENT SINGLE X2;  Median Sternotomy, Extracorporeal Membrane Oxygenator, bilateral sequential lung transplant;  Surgeon: Toby Hernandez MD;  Location:  OR       Family History   Problem Relation Age of Onset     Hypertension Mother      Arthritis Mother      Pancreatic Cancer Father      DIABETES Father        Social History     Social History     Marital status:      Spouse name: N/A     Number of children: N/A     Years of education: N/A     Occupational History     Not on file.     Social History Main Topics     Smoking status: Never Smoker     Smokeless tobacco: Never Used     Alcohol use No     Drug use: No     Sexual activity: Not on file     Other Topics Concern     Parent/Sibling W/ Cabg, Mi Or Angioplasty Before 65f 55m? No     Social History Narrative       Current Outpatient Prescriptions   Medication     polyethylene glycol (MIRALAX/GLYCOLAX) Packet     tacrolimus (GENERIC EQUIVALENT) 1 MG capsule     warfarin (COUMADIN) 1 MG tablet     tacrolimus (GENERIC EQUIVALENT) 0.5 MG capsule     valGANciclovir (VALCYTE) 450 MG tablet     calcium-vitamin D (CALTRATE) 600-400 MG-UNIT per tablet     magnesium oxide (MAG-OX) 400 MG tablet     metoprolol tartrate (LOPRESSOR) 25 MG tablet     pantoprazole (PROTONIX) 40 MG EC tablet     sulfamethoxazole-trimethoprim (BACTRIM/SEPTRA) 400-80 MG per tablet     multivitamin, therapeutic with minerals (THERA-VIT-M) TABS tablet     nystatin (MYCOSTATIN) 890353 UNIT/ML suspension     predniSONE (DELTASONE) 5 MG tablet     mycophenolate (GENERIC EQUIVALENT) 250 MG capsule     levalbuterol (XOPENEX HFA) 45 MCG/ACT Inhaler     senna-docusate (SENOKOT-S;PERICOLACE) 8.6-50 MG per tablet     [DISCONTINUED] warfarin (COUMADIN) 1 MG tablet     tacrolimus (GENERIC EQUIVALENT) 0.5 MG capsule     order for DME     No current facility-administered medications for this visit.      /89 (BP Location: Right arm,  "Patient Position: Sitting, Cuff Size: Adult Regular)  Pulse 100  Temp 98.4  F (36.9  C) (Oral)  Resp 16  Ht 1.613 m (5' 3.5\")  Wt 56.2 kg (124 lb)  SpO2 95%  BMI 21.62 kg/m2    Exam:     GENERAL APPEARANCE: Well developed, well nourished, alert, and in no apparent distress.    EYES: PERRL, EOMI    HENT: Nasal mucosa with no edema and no hyperemia. No nasal polyps. Perforated septum.    EARS: Canals clear, right TM normal. Left TM with small perforation.    MOUTH: Oral mucosa is moist, without any lesions, no tonsillar enlargement, no oropharyngeal exudate.    NECK: supple, no masses, no thyromegaly.    LYMPHATICS: No significant axillary, cervical, or supraclavicular nodes.    RESP: normal percussion, good air flow throughout. Left basilar crackle. Faint inspiratory wheeze. No rhonchi.    CV: Normal S1, S2, regular rhythm, normal rate. No murmur.  No rub. No gallop. No LE edema.     ABDOMEN:  Bowel sounds normal, soft, nontender, no HSM or masses.     MS: extremities normal. (+) clubbing.     SKIN: no rash on limited exam    NEURO: Mentation intact, speech normal, normal strength and tone, normal gait and stance    PSYCH: mentation appears normal. and affect normal/bright.      Recent Results (from the past 168 hour(s))   INR    Collection Time: 03/28/18  6:51 AM   Result Value Ref Range    INR 2.38 (H) 0.86 - 1.14   Basic metabolic panel    Collection Time: 03/30/18  8:25 AM   Result Value Ref Range    Sodium 136 133 - 144 mmol/L    Potassium 4.8 3.4 - 5.3 mmol/L    Chloride 100 94 - 109 mmol/L    Carbon Dioxide 27 20 - 32 mmol/L    Anion Gap 8 3 - 14 mmol/L    Glucose 111 (H) 70 - 99 mg/dL    Urea Nitrogen 32 (H) 7 - 30 mg/dL    Creatinine 1.05 (H) 0.52 - 1.04 mg/dL    GFR Estimate 54 (L) >60 mL/min/1.7m2    GFR Estimate If Black 66 >60 mL/min/1.7m2    Calcium 9.2 8.5 - 10.1 mg/dL   Magnesium    Collection Time: 03/30/18  8:25 AM   Result Value Ref Range    Magnesium 1.8 1.6 - 2.3 mg/dL   CBC with platelets "    Collection Time: 03/30/18  8:25 AM   Result Value Ref Range    WBC 10.7 4.0 - 11.0 10e9/L    RBC Count 2.86 (L) 3.8 - 5.2 10e12/L    Hemoglobin 9.3 (L) 11.7 - 15.7 g/dL    Hematocrit 29.2 (L) 35.0 - 47.0 %     (H) 78 - 100 fl    MCH 32.5 26.5 - 33.0 pg    MCHC 31.8 31.5 - 36.5 g/dL    RDW 18.7 (H) 10.0 - 15.0 %    Platelet Count 573 (H) 150 - 450 10e9/L   CMV DNA quantification    Collection Time: 03/30/18  8:25 AM   Result Value Ref Range    CMV DNA Quantitation Specimen Plasma     CMV Quant IU/mL CMV DNA Not Detected CMVND^CMV DNA Not Detected [IU]/mL    Log IU/mL of CMVQNT Not Calculated <2.1 [Log_IU]/mL   Tacrolimus level    Collection Time: 03/30/18  8:25 AM   Result Value Ref Range    Tacrolimus Last Dose 2000,3/29/18     Tacrolimus Level 10.8 5.0 - 15.0 ug/L   General PFT Lab (Please always keep checked)    Collection Time: 03/30/18  8:37 AM   Result Value Ref Range    FVC-Pred 3.25 L    FVC-Pre 1.34 L    FVC-%Pred-Pre 41 %    FEV1-Pre 1.08 L    FEV1-%Pred-Pre 41 %    FEV1FVC-Pred 79 %    FEV1FVC-Pre 81 %    FEFMax-Pred 6.45 L/sec    FEFMax-Pre 3.10 L/sec    FEFMax-%Pred-Pre 48 %    FEF2575-Pred 2.46 L/sec    FEF2575-Pre 1.00 L/sec    WDU1170-%Pred-Pre 40 %    ExpTime-Pre 6.48 sec    FIFMax-Pre 2.54 L/sec    FEV1FEV6-Pred 81 %    FEV1FEV6-Pre 81 %   INR    Collection Time: 03/30/18 10:32 AM   Result Value Ref Range    INR 1.85 (H) 0.86 - 1.14   Basic metabolic panel    Collection Time: 04/03/18  7:41 AM   Result Value Ref Range    Sodium 137 133 - 144 mmol/L    Potassium 4.3 3.4 - 5.3 mmol/L    Chloride 103 94 - 109 mmol/L    Carbon Dioxide 27 20 - 32 mmol/L    Anion Gap 7 3 - 14 mmol/L    Glucose 109 (H) 70 - 99 mg/dL    Urea Nitrogen 26 7 - 30 mg/dL    Creatinine 0.92 0.52 - 1.04 mg/dL    GFR Estimate 63 >60 mL/min/1.7m2    GFR Estimate If Black 77 >60 mL/min/1.7m2    Calcium 8.6 8.5 - 10.1 mg/dL   Magnesium    Collection Time: 04/03/18  7:41 AM   Result Value Ref Range    Magnesium 1.7 1.6 - 2.3  mg/dL   CBC with platelets    Collection Time: 04/03/18  7:41 AM   Result Value Ref Range    WBC 18.8 (H) 4.0 - 11.0 10e9/L    RBC Count 2.87 (L) 3.8 - 5.2 10e12/L    Hemoglobin 9.2 (L) 11.7 - 15.7 g/dL    Hematocrit 29.9 (L) 35.0 - 47.0 %     (H) 78 - 100 fl    MCH 32.1 26.5 - 33.0 pg    MCHC 30.8 (L) 31.5 - 36.5 g/dL    RDW 18.5 (H) 10.0 - 15.0 %    Platelet Count 574 (H) 150 - 450 10e9/L   INR    Collection Time: 04/03/18  7:41 AM   Result Value Ref Range    INR 3.48 (H) 0.86 - 1.14   Tacrolimus level    Collection Time: 04/03/18  7:42 AM   Result Value Ref Range    Tacrolimus Last Dose 1900 04/02/2018     Tacrolimus Level 12.9 5.0 - 15.0 ug/L   General PFT Lab (Please always keep checked)    Collection Time: 04/03/18 12:57 PM   Result Value Ref Range    FVC-Pred 3.25 L    FVC-Pre 1.10 L    FVC-%Pred-Pre 33 %    FEV1-Pre 1.01 L    FEV1-%Pred-Pre 38 %    FEV1FVC-Pred 79 %    FEV1FVC-Pre 92 %    FEFMax-Pred 6.45 L/sec    FEFMax-Pre 2.91 L/sec    FEFMax-%Pred-Pre 45 %    FEF2575-Pred 2.46 L/sec    FEF2575-Pre 1.31 L/sec    USQ8676-%Pred-Pre 53 %    ExpTime-Pre 4.90 sec    FIFMax-Pre 2.75 L/sec    FEV1FEV6-Pred 81 %    FEV1FEV6-Pre 94 %                 Results as noted above.    PFT Interpretation:  Severe restrictive ventilatory defect.  Decreased from previous.  Below recent best.   Valid Maneuver          Scribe Disclosure:   I, Ashish Mckeon, am serving as a scribe; to document services personally performed by Lopez Macias MD based on data collection and the provider's statements to me.     Provider Disclosure:  I agree with above History, Review of Systems, Physical exam and Plan.  I have reviewed the content of the documentation and have edited it as needed. I have personally performed the services documented here and the documentation accurately represents those services and the decisions I have made.      Electronically signed by:  Lopez Macias MD

## 2018-04-03 NOTE — NURSING NOTE
"Chief Complaint   Patient presents with     RECHECK     S/P Lung TX       Initial /89 (BP Location: Right arm, Patient Position: Sitting, Cuff Size: Adult Regular)  Pulse 100  Temp 98.4  F (36.9  C) (Oral)  Resp 16  Ht 1.613 m (5' 3.5\")  Wt 56.2 kg (124 lb)  SpO2 95%  BMI 21.62 kg/m2 Estimated body mass index is 21.62 kg/(m^2) as calculated from the following:    Height as of this encounter: 1.613 m (5' 3.5\").    Weight as of this encounter: 56.2 kg (124 lb).  Medication Reconciliation: complete     Gianna Rogers [facility]  4/3/2018 1:29 PM        "

## 2018-04-03 NOTE — MR AVS SNAPSHOT
Kecia Blue   4/3/2018   Anticoagulation Therapy Visit    Description:  57 year old female   Provider:  Anju Meyers, RN   Department:  [facility]           INR as of 4/3/2018     Today's INR 3.48!      Anticoagulation Summary as of 4/3/2018     INR goal 2.0-3.0   Today's INR 3.48!   Full instructions 4/3: 2 mg; 4/4: 4 mg; Otherwise No maintenance plan   Next INR check 4/5/2018    Indications   Lung transplant recipient (H) [Z94.2]  Deep vein thrombosis (DVT) (H) [I82.409] [I82.409]         April 2018 Details    Sun Mon Tue Wed Thu Fri Sat     1               2               3      2 mg   See details      4      4 mg         5            6               7                 8               9               10               11               12               13               14                 15               16               17               18               19               20               21                 22               23               24               25               26               27               28                 29               30                     Date Details   04/03 This INR check       Date of next INR:  4/5/2018         How to take your warfarin dose     To take:  2 mg Take 2 of the 1 mg tablets.    To take:  4 mg Take 4 of the 1 mg tablets.

## 2018-04-03 NOTE — PATIENT INSTRUCTIONS
Patient Instructions  1. Increase Valcyte to 900 mg daily   2. Take tylenol 1000 mg three times daily  3. Bronchoscopy next Tuesday   4. Coumadin clinic will call you on coumadin/lovenox instruction     Next transplant clinic appointment:  Thursday with CXR, labs and PFTs  Next lab draw: Thursday      ~~~~~~~~~~~~~~~~~~~~~~~~~    Thoracic Transplant Office phone [phone], fax [phone]    Office Hours 8:30 - 5:00     For after-hours urgent issues, please dial [phone], and ask to speak with the Thoracic Transplant Coordinator  On-Call, pager [phone].  --------------------  To expedite your medication refill(s), please contact your pharmacy and have them fax a refill request to: [phone]  .   *Please allow 3 business days for routine medication refills.  *Please allow 5 business days for controlled substance medication refills.    **For Diabetic medications and supplies refill(s), please contact your pharmacy and have them  Contact your Endocrine team.  --------------------  For scheduling appointments call [facility] :  [phone]. For lab appointments call [phone] or [facility].  --------------------  Please Note: If you are active on [employer], all future test results will be sent by [employer] message only, and will no longer be called to patient. You may also receive communication directly from your physician.

## 2018-04-03 NOTE — NURSING NOTE
Transplant Coordinator Note     Reason for visit: Post lung transplant follow up visit   Coordinator: Present   Caregiver:  present     Health concerns addressed today:  1. mid sternal chest pain. Comes and goes, worse in the AM, gets better as the day goes on. Tylenol helps   *schedule tylenol q 8 hrs   2. No SOB at rest but does have SOB with activity  3. Cough, occasionally coughs up clear colored sputum   4. WBC 18.8 today - denies fevers, chills, body aches   5. CrCl 61  *increase Valcyte to 900 mg daily   6. PFT down--Repeat PFTs on Friday and check DSA       Activity/rehab: pulmonary rehab starts on 4/6  Oxygen needs: RA  Pain management/RX: na  Diabetic management: na  Next Bronch due: 4/10  High risk donor:  Yes   CMV status: R+/D+  Valcyte dose: CrCl 61 on 4/3, increased Valcyte to 900 mg daily   Valcyte stopped: at 3 month zachary, June 1st   DVT/PE: yes  AC/asa: coumadin  PJP prophylactic: bactrim  Other: she has raynauds's --fingers purple      Pt Education: medications (use/dose/side effects), how/when to call coordinator, frequency of labs, s/s of infection/rejection, call prior to starting any new medications, lab/vital sign book, prednisone taper  Health Maintenance:     Last colonoscopy:     Next colonoscopy due:     Dermatology: annually     Vaccinations this visit: na     Labs, CXR, PFTs reviewed with patient  Medication record reviewed and reconciled  Questions and concerns addressed    Patient Instructions  1. Increase Valcyte to 900 mg daily   2. Take tylenol 1000 mg three times daily  3. Bronchoscopy next Tuesday   4. Coumadin clinic will call you on coumadin/lovenox instruction     Next transplant clinic appointment:  Thursday with CXR, labs and PFTs  Next lab draw: Thursday

## 2018-04-03 NOTE — PROGRESS NOTES
Reason for visit:    Kecia Blue is a 57 year old female being seen today for RECHECK (S/P Lung TX)    Assessment and Plan:  Kecia Blue is a 58 y/o female 33 days s/p bilateral lung transplant with h/o dermatomyositis, seronegative RA, ILD and pulmonary hypertension. She was admitted for emergent lung transplant workup on 2/21. She progressed to V-A ECMO for right heart failure on 2/27 and subsequently received a BSLT on 3/1/18. Post operative course complicated by right-sided pleural effusion s/p thoracentesis ~ 1.5 liters removed. Also LUE DVT and anemia.     1. S/p lung transplant: Today, the patient is feeling well though her post-transplant PFTs are down slightly from last week. She reports a mild cough productive of clear sputum. She will begin pulmonary rehab on Friday (4/6). I review today's CXR with the patient and her  which was stable from one week ago. Continue current immunosuppression (mycophenolate 1500mg BID, tracrolimus - goal 10 to 15, and prednisone taper) and prophylaxis (Bactrim, Valcyte - CMV +/+, and nystatin). Today's reduction in PFTs may be related to the patients new chest pain. She will begin Tylenol 1000mg BID and repeat PFTs on Thursday (4/5).      2. Hypomagnesemia: Remains at the low end of normal today at 1.7. Continue current supplement.    3. Provoked LUE DVT: Found on ultrasound on 3/7 demonstrating occlusive thrombus in the left subclavian vein, axillary vein, basilic vein in the upper arm, cephalic vein near junction with the subclavian vein and cephalic vein from mid arm to the wrist. On warfarin, goal INR 2-3. INR of 3.48 today - above the therapeutic range for warfarin but not concerning at this time. Defer to coumadin clinic for dose adjustment.    4. Dermatomyositis: with likely secondary Raynauds (per patient not officially diagnosed). She has pronounced purple discoloration of the digits on her left hand and right index finger. Cold to the touch. Also  notes this occurs in her toes. Was on Imuran, tacrolimus and prednisone prior to transplant. Not on a CCB. Does have a rheumatologist at home, but would like to touch base with one while in [place].   - Referral to Rheumatology today    5. Hypertension: Blood pressure of 126/89 today - well managed with current metoprolol. Will continue metoprolol 25 mg BID.    6. Raynaud's Syndrome: Previously diagnosed per the patient. Explains cyanosis in the digits.     7. Health Care Maintenance: Today's labs were reviewed with the patient and her . Electrolyte levels are wnl. Creatine is improved and wnl. WBC is high and elevated from her last appointment. Hemoglobin is slightly low today but stable from her last visit.    This patient will be seen for follow up with Dr. Mcelroy in two days with PFTs, CXR and labs with DSA.    40 minutes face to face time with the patient, more than half spent in counseling and coordination of care regarding reduced PFTs and chest pain.    Lung TX HPI:    Kecia Blue is a 56 y/o female 33 days s/p bilateral lung transplant with h/o dermatomyositis, seronegative RA, ILD and pulmonary hypertension. She was admitted for emergent lung transplant workup on 2/21. She progressed to V-A ECMO for right heart failure on 2/27 and subsequently received a BSLT on 3/1/18. She was extubated 3/6. Post operative course complicated by right-sided pleural effusion s/p thoracentesis ~ 1.5 liters removed. Also LUE DVT and anemia.    Today, the patient is one week post-discharge and generally feeling well. She does report pain in her chest which was not present in the hospital. The pain comes and goes and is localized to the center of her chest. Partial symptom relief by Tylenol. Pain is worse in the morning and improves through the day. Pain is not exacerbated by walking or eating. No associated nausea or SOB.     Breathing is comfortable at rest and similar to a week ago. Dyspnea on exertion with  daily activities. She is able to walk the length of a block comfortably but uses an electric chair for longer distances. Exercise is limited by leg fatigue and breathing. She will begin pulmonary rehab on Friday. She reports a cough at baseline with an additional, new weak cough productive of a small amount of clear sputum. No hemoptysis. No fever, chills or night sweats.       Review of Systems.  CONST: Appetite is good.  ENT: No sinus/ear pain, sore throat. Small amt of clear rhinorrhea.   GI: No nausea, vomiting, or loose stools. No abdominal pain. Constipation relieved by senna.    A complete ROS was otherwise negative except as noted in the HPI.     Past Medical History:   Diagnosis Date     Antisynthetase syndrome (H) 2014     Chronic cough      Dermatomyositis (H)      Dysplasia of cervix, low grade (ESTRADA 1)      ILD (interstitial lung disease) (H)      Osteopenia      Pulmonary hypertension      Raynaud's disease      Seronegative rheumatoid arthritis (H)      Past Surgical History:   Procedure Laterality Date     BRONCHOSCOPY FLEXIBLE N/A 3/13/2018    Procedure: BRONCHOSCOPY FLEXIBLE;  Flexible Bronchoscopy ;  Surgeon: Gissell Sanchez MD;  Location:  GI     ENT SURGERY      tonsillectomy as a child     INSERT EXTRACORPORAL MEMBRANE OXYGENATOR ADULT (OUTSIDE OR) N/A 2/27/2018    Procedure: INSERT EXTRACORPORAL MEMBRANE OXYGENATOR ADULT (OUTSIDE OR);  INSERT EXTRACORPORAL MEMBRANE OXYGENATOR ADULT (OUTSIDE OR) ;  Surgeon: Toby Hernandez MD;  Location:  OR     no prior surgery       REMOVE EXTRACORPORAL MEMBRANE OXYGENATOR ADULT N/A 3/3/2018    Procedure: REMOVE EXTRACORPORAL MEMBRANE OXYGENATOR ADULT;  Removal of Right Femoral Venous and Right Axillary Arterial Extracorporeal Membrane Oxygenator;  Surgeon: Toby Hernandez MD;  Location:  OR     TRANSPLANT LUNG RECIPIENT SINGLE X2 Bilateral 3/1/2018    Procedure: TRANSPLANT LUNG RECIPIENT SINGLE X2;  Median Sternotomy,  Extracorporeal Membrane Oxygenator, bilateral sequential lung transplant;  Surgeon: Toby Hernandez MD;  Location: [facility] OR       Family History   Problem Relation Age of Onset     Hypertension Mother      Arthritis Mother      Pancreatic Cancer Father      DIABETES Father        Social History     Social History     Marital status:      Spouse name: N/A     Number of children: N/A     Years of education: N/A     Occupational History     Not on file.     Social History Main Topics     Smoking status: Never Smoker     Smokeless tobacco: Never Used     Alcohol use No     Drug use: No     Sexual activity: Not on file     Other Topics Concern     Parent/Sibling W/ Cabg, Mi Or Angioplasty Before 65f 55m? No     Social History Narrative       Current Outpatient Prescriptions   Medication     polyethylene glycol (MIRALAX/GLYCOLAX) Packet     tacrolimus (GENERIC EQUIVALENT) 1 MG capsule     warfarin (COUMADIN) 1 MG tablet     tacrolimus (GENERIC EQUIVALENT) 0.5 MG capsule     valGANciclovir (VALCYTE) 450 MG tablet     calcium-vitamin D (CALTRATE) 600-400 MG-UNIT per tablet     magnesium oxide (MAG-OX) 400 MG tablet     metoprolol tartrate (LOPRESSOR) 25 MG tablet     pantoprazole (PROTONIX) 40 MG EC tablet     sulfamethoxazole-trimethoprim (BACTRIM/SEPTRA) 400-80 MG per tablet     multivitamin, therapeutic with minerals (THERA-VIT-M) TABS tablet     nystatin (MYCOSTATIN) 774163 UNIT/ML suspension     predniSONE (DELTASONE) 5 MG tablet     mycophenolate (GENERIC EQUIVALENT) 250 MG capsule     levalbuterol (XOPENEX HFA) 45 MCG/ACT Inhaler     senna-docusate (SENOKOT-S;PERICOLACE) 8.6-50 MG per tablet     [DISCONTINUED] warfarin (COUMADIN) 1 MG tablet     tacrolimus (GENERIC EQUIVALENT) 0.5 MG capsule     order for DME     No current facility-administered medications for this visit.      /89 (BP Location: Right arm, Patient Position: Sitting, Cuff Size: Adult Regular)  Pulse 100  Temp 98.4  F (36.9  C)  "(Oral)  Resp 16  Ht 1.613 m (5' 3.5\")  Wt 56.2 kg (124 lb)  SpO2 95%  BMI 21.62 kg/m2    Exam:     GENERAL APPEARANCE: Well developed, well nourished, alert, and in no apparent distress.    EYES: PERRL, EOMI    HENT: Nasal mucosa with no edema and no hyperemia. No nasal polyps. Perforated septum.    EARS: Canals clear, right TM normal. Left TM with small perforation.    MOUTH: Oral mucosa is moist, without any lesions, no tonsillar enlargement, no oropharyngeal exudate.    NECK: supple, no masses, no thyromegaly.    LYMPHATICS: No significant axillary, cervical, or supraclavicular nodes.    RESP: normal percussion, good air flow throughout. Left basilar crackle. Faint inspiratory wheeze. No rhonchi.    CV: Normal S1, S2, regular rhythm, normal rate. No murmur.  No rub. No gallop. No LE edema.     ABDOMEN:  Bowel sounds normal, soft, nontender, no HSM or masses.     MS: extremities normal. (+) clubbing.     SKIN: no rash on limited exam    NEURO: Mentation intact, speech normal, normal strength and tone, normal gait and stance    PSYCH: mentation appears normal. and affect normal/bright.      Recent Results (from the past 168 hour(s))   INR    Collection Time: 03/28/18  6:51 AM   Result Value Ref Range    INR 2.38 (H) 0.86 - 1.14   Basic metabolic panel    Collection Time: 03/30/18  8:25 AM   Result Value Ref Range    Sodium 136 133 - 144 mmol/L    Potassium 4.8 3.4 - 5.3 mmol/L    Chloride 100 94 - 109 mmol/L    Carbon Dioxide 27 20 - 32 mmol/L    Anion Gap 8 3 - 14 mmol/L    Glucose 111 (H) 70 - 99 mg/dL    Urea Nitrogen 32 (H) 7 - 30 mg/dL    Creatinine 1.05 (H) 0.52 - 1.04 mg/dL    GFR Estimate 54 (L) >60 mL/min/1.7m2    GFR Estimate If Black 66 >60 mL/min/1.7m2    Calcium 9.2 8.5 - 10.1 mg/dL   Magnesium    Collection Time: 03/30/18  8:25 AM   Result Value Ref Range    Magnesium 1.8 1.6 - 2.3 mg/dL   CBC with platelets    Collection Time: 03/30/18  8:25 AM   Result Value Ref Range    WBC 10.7 4.0 - 11.0 " 10e9/L    RBC Count 2.86 (L) 3.8 - 5.2 10e12/L    Hemoglobin 9.3 (L) 11.7 - 15.7 g/dL    Hematocrit 29.2 (L) 35.0 - 47.0 %     (H) 78 - 100 fl    MCH 32.5 26.5 - 33.0 pg    MCHC 31.8 31.5 - 36.5 g/dL    RDW 18.7 (H) 10.0 - 15.0 %    Platelet Count 573 (H) 150 - 450 10e9/L   CMV DNA quantification    Collection Time: 03/30/18  8:25 AM   Result Value Ref Range    CMV DNA Quantitation Specimen Plasma     CMV Quant IU/mL CMV DNA Not Detected CMVND^CMV DNA Not Detected [IU]/mL    Log IU/mL of CMVQNT Not Calculated <2.1 [Log_IU]/mL   Tacrolimus level    Collection Time: 03/30/18  8:25 AM   Result Value Ref Range    Tacrolimus Last Dose 2000,3/29/18     Tacrolimus Level 10.8 5.0 - 15.0 ug/L   General PFT Lab (Please always keep checked)    Collection Time: 03/30/18  8:37 AM   Result Value Ref Range    FVC-Pred 3.25 L    FVC-Pre 1.34 L    FVC-%Pred-Pre 41 %    FEV1-Pre 1.08 L    FEV1-%Pred-Pre 41 %    FEV1FVC-Pred 79 %    FEV1FVC-Pre 81 %    FEFMax-Pred 6.45 L/sec    FEFMax-Pre 3.10 L/sec    FEFMax-%Pred-Pre 48 %    FEF2575-Pred 2.46 L/sec    FEF2575-Pre 1.00 L/sec    VGE1173-%Pred-Pre 40 %    ExpTime-Pre 6.48 sec    FIFMax-Pre 2.54 L/sec    FEV1FEV6-Pred 81 %    FEV1FEV6-Pre 81 %   INR    Collection Time: 03/30/18 10:32 AM   Result Value Ref Range    INR 1.85 (H) 0.86 - 1.14   Basic metabolic panel    Collection Time: 04/03/18  7:41 AM   Result Value Ref Range    Sodium 137 133 - 144 mmol/L    Potassium 4.3 3.4 - 5.3 mmol/L    Chloride 103 94 - 109 mmol/L    Carbon Dioxide 27 20 - 32 mmol/L    Anion Gap 7 3 - 14 mmol/L    Glucose 109 (H) 70 - 99 mg/dL    Urea Nitrogen 26 7 - 30 mg/dL    Creatinine 0.92 0.52 - 1.04 mg/dL    GFR Estimate 63 >60 mL/min/1.7m2    GFR Estimate If Black 77 >60 mL/min/1.7m2    Calcium 8.6 8.5 - 10.1 mg/dL   Magnesium    Collection Time: 04/03/18  7:41 AM   Result Value Ref Range    Magnesium 1.7 1.6 - 2.3 mg/dL   CBC with platelets    Collection Time: 04/03/18  7:41 AM   Result Value Ref  Range    WBC 18.8 (H) 4.0 - 11.0 10e9/L    RBC Count 2.87 (L) 3.8 - 5.2 10e12/L    Hemoglobin 9.2 (L) 11.7 - 15.7 g/dL    Hematocrit 29.9 (L) 35.0 - 47.0 %     (H) 78 - 100 fl    MCH 32.1 26.5 - 33.0 pg    MCHC 30.8 (L) 31.5 - 36.5 g/dL    RDW 18.5 (H) 10.0 - 15.0 %    Platelet Count 574 (H) 150 - 450 10e9/L   INR    Collection Time: 04/03/18  7:41 AM   Result Value Ref Range    INR 3.48 (H) 0.86 - 1.14   Tacrolimus level    Collection Time: 04/03/18  7:42 AM   Result Value Ref Range    Tacrolimus Last Dose 1900 04/02/2018     Tacrolimus Level 12.9 5.0 - 15.0 ug/L   General PFT Lab (Please always keep checked)    Collection Time: 04/03/18 12:57 PM   Result Value Ref Range    FVC-Pred 3.25 L    FVC-Pre 1.10 L    FVC-%Pred-Pre 33 %    FEV1-Pre 1.01 L    FEV1-%Pred-Pre 38 %    FEV1FVC-Pred 79 %    FEV1FVC-Pre 92 %    FEFMax-Pred 6.45 L/sec    FEFMax-Pre 2.91 L/sec    FEFMax-%Pred-Pre 45 %    FEF2575-Pred 2.46 L/sec    FEF2575-Pre 1.31 L/sec    JMI2776-%Pred-Pre 53 %    ExpTime-Pre 4.90 sec    FIFMax-Pre 2.75 L/sec    FEV1FEV6-Pred 81 %    FEV1FEV6-Pre 94 %                 Results as noted above.    PFT Interpretation:  Severe restrictive ventilatory defect.  Decreased from previous.  Below recent best.   Valid Maneuver          Scribe Disclosure:   I, Ashish Mckeon, am serving as a scribe; to document services personally performed by Lopez Macias MD based on data collection and the provider's statements to me.     Provider Disclosure:  I agree with above History, Review of Systems, Physical exam and Plan.  I have reviewed the content of the documentation and have edited it as needed. I have personally performed the services documented here and the documentation accurately represents those services and the decisions I have made.      Electronically signed by:  Lopez Macias

## 2018-04-04 LAB
CMV DNA SPEC NAA+PROBE-ACNC: NORMAL [IU]/ML
CMV DNA SPEC NAA+PROBE-LOG#: NORMAL {LOG_IU}/ML
PRA DONOR SPECIFIC ABY: NORMAL
SPECIMEN SOURCE: NORMAL

## 2018-04-04 NOTE — PROGRESS NOTES
Tacrolimus level 12.9 on 4/3. Transplant tab states goal level 9-11, MD notes states 10-15, will clarify at next appointment (tomorrow) goal range. No dose changes at this time, discussed with patient..

## 2018-04-05 ENCOUNTER — OFFICE VISIT (OUTPATIENT)
Dept: TRANSPLANT | Facility: CLINIC | Age: 58
End: 2018-04-05
Attending: INTERNAL MEDICINE
Payer: COMMERCIAL

## 2018-04-05 ENCOUNTER — RESULTS ONLY (OUTPATIENT)
Dept: OTHER | Facility: CLINIC | Age: 58
End: 2018-04-05

## 2018-04-05 ENCOUNTER — ANTICOAGULATION THERAPY VISIT (OUTPATIENT)
Dept: ANTICOAGULATION | Facility: CLINIC | Age: 58
End: 2018-04-05

## 2018-04-05 ENCOUNTER — RADIANT APPOINTMENT (OUTPATIENT)
Dept: GENERAL RADIOLOGY | Facility: CLINIC | Age: 58
End: 2018-04-05
Attending: PHYSICIAN ASSISTANT
Payer: COMMERCIAL

## 2018-04-05 VITALS
WEIGHT: 123.5 LBS | HEART RATE: 103 BPM | TEMPERATURE: 97.9 F | BODY MASS INDEX: 21.53 KG/M2 | DIASTOLIC BLOOD PRESSURE: 88 MMHG | SYSTOLIC BLOOD PRESSURE: 132 MMHG

## 2018-04-05 DIAGNOSIS — Z94.2 S/P LUNG TRANSPLANT (H): ICD-10-CM

## 2018-04-05 DIAGNOSIS — Z94.2 LUNG REPLACED BY TRANSPLANT (H): ICD-10-CM

## 2018-04-05 DIAGNOSIS — I82.409 DEEP VEIN THROMBOSIS (DVT) (H): ICD-10-CM

## 2018-04-05 DIAGNOSIS — Z94.2 LUNG TRANSPLANT RECIPIENT (H): ICD-10-CM

## 2018-04-05 DIAGNOSIS — Z94.2 LUNG TRANSPLANT RECIPIENT (H): Primary | ICD-10-CM

## 2018-04-05 DIAGNOSIS — K59.09 OTHER CONSTIPATION: ICD-10-CM

## 2018-04-05 DIAGNOSIS — I82.622 ACUTE DEEP VEIN THROMBOSIS (DVT) OF LEFT UPPER EXTREMITY, UNSPECIFIED VEIN (H): ICD-10-CM

## 2018-04-05 DIAGNOSIS — B25.9 CYTOMEGALOVIRUS INFECTION, UNSPECIFIED CYTOMEGALOVIRAL INFECTION TYPE (H): Primary | ICD-10-CM

## 2018-04-05 LAB
ANION GAP SERPL CALCULATED.3IONS-SCNC: 7 MMOL/L (ref 3–14)
BUN SERPL-MCNC: 23 MG/DL (ref 7–30)
CALCIUM SERPL-MCNC: 8.5 MG/DL (ref 8.5–10.1)
CHLORIDE SERPL-SCNC: 103 MMOL/L (ref 94–109)
CO2 SERPL-SCNC: 26 MMOL/L (ref 20–32)
CREAT SERPL-MCNC: 0.98 MG/DL (ref 0.52–1.04)
ERYTHROCYTE [DISTWIDTH] IN BLOOD BY AUTOMATED COUNT: 18.3 % (ref 10–15)
EXPTIME-PRE: 6.7 SEC
FEF2575-%PRED-PRE: 65 %
FEF2575-PRE: 1.62 L/SEC
FEF2575-PRED: 2.46 L/SEC
FEFMAX-%PRED-PRE: 54 %
FEFMAX-PRE: 3.51 L/SEC
FEFMAX-PRED: 6.45 L/SEC
FEV1-%PRED-PRE: 48 %
FEV1-PRE: 1.25 L
FEV1FEV6-PRE: 91 %
FEV1FEV6-PRED: 81 %
FEV1FVC-PRE: 89 %
FEV1FVC-PRED: 79 %
FIFMAX-PRE: 3.01 L/SEC
FVC-%PRED-PRE: 43 %
FVC-PRE: 1.4 L
FVC-PRED: 3.25 L
GFR SERPL CREATININE-BSD FRML MDRD: 59 ML/MIN/1.7M2
GLUCOSE SERPL-MCNC: 110 MG/DL (ref 70–99)
HCT VFR BLD AUTO: 30.1 % (ref 35–47)
HGB BLD-MCNC: 9.3 G/DL (ref 11.7–15.7)
INR PPP: 3.75 (ref 0.86–1.14)
MAGNESIUM SERPL-MCNC: 1.6 MG/DL (ref 1.6–2.3)
MCH RBC QN AUTO: 31.8 PG (ref 26.5–33)
MCHC RBC AUTO-ENTMCNC: 30.9 G/DL (ref 31.5–36.5)
MCV RBC AUTO: 103 FL (ref 78–100)
PLATELET # BLD AUTO: 524 10E9/L (ref 150–450)
POTASSIUM SERPL-SCNC: 4.7 MMOL/L (ref 3.4–5.3)
RBC # BLD AUTO: 2.92 10E12/L (ref 3.8–5.2)
SODIUM SERPL-SCNC: 137 MMOL/L (ref 133–144)
TACROLIMUS BLD-MCNC: 18.1 UG/L (ref 5–15)
TME LAST DOSE: ABNORMAL H
WBC # BLD AUTO: 15.9 10E9/L (ref 4–11)

## 2018-04-05 PROCEDURE — 36415 COLL VENOUS BLD VENIPUNCTURE: CPT | Performed by: PHYSICIAN ASSISTANT

## 2018-04-05 PROCEDURE — 83735 ASSAY OF MAGNESIUM: CPT | Performed by: PHYSICIAN ASSISTANT

## 2018-04-05 PROCEDURE — 80197 ASSAY OF TACROLIMUS: CPT | Performed by: PHYSICIAN ASSISTANT

## 2018-04-05 PROCEDURE — 85027 COMPLETE CBC AUTOMATED: CPT | Performed by: PHYSICIAN ASSISTANT

## 2018-04-05 PROCEDURE — 80048 BASIC METABOLIC PNL TOTAL CA: CPT | Performed by: PHYSICIAN ASSISTANT

## 2018-04-05 PROCEDURE — G0463 HOSPITAL OUTPT CLINIC VISIT: HCPCS | Mod: ZF

## 2018-04-05 RX ORDER — MYCOPHENOLATE MOFETIL 250 MG/1
1500 CAPSULE ORAL 2 TIMES DAILY
Qty: 360 CAPSULE | Refills: 11 | Status: SHIPPED | OUTPATIENT
Start: 2018-04-05 | End: 2018-06-28

## 2018-04-05 RX ORDER — VALGANCICLOVIR 450 MG/1
900 TABLET, FILM COATED ORAL DAILY
Qty: 10 TABLET | Refills: 0 | Status: SHIPPED | OUTPATIENT
Start: 2018-04-05 | End: 2018-04-09

## 2018-04-05 RX ORDER — TACROLIMUS 0.5 MG/1
0.5 CAPSULE ORAL 2 TIMES DAILY
Qty: 120 CAPSULE | Refills: 3 | Status: SHIPPED | OUTPATIENT
Start: 2018-04-05 | End: 2018-04-11

## 2018-04-05 RX ORDER — NYSTATIN 100000/ML
10 SUSPENSION, ORAL (FINAL DOSE FORM) ORAL 4 TIMES DAILY
Qty: 1200 ML | Refills: 5 | Status: SHIPPED | OUTPATIENT
Start: 2018-04-05 | End: 2018-05-21

## 2018-04-05 RX ORDER — TACROLIMUS 1 MG/1
5 CAPSULE ORAL 2 TIMES DAILY
Qty: 300 CAPSULE | Refills: 11 | Status: SHIPPED | OUTPATIENT
Start: 2018-04-05 | End: 2018-04-11

## 2018-04-05 RX ORDER — VALGANCICLOVIR 450 MG/1
900 TABLET, FILM COATED ORAL DAILY
Qty: 60 TABLET | Refills: 6 | Status: SHIPPED | OUTPATIENT
Start: 2018-04-05 | End: 2018-06-05

## 2018-04-05 NOTE — NURSING NOTE
Transplant Coordinator Note     Reason for visit: Post lung transplant follow up visit   Coordinator: Present   Caregiver:  present     Health concerns addressed today:  1. Chest pain improved with schedule tylenol   2. PFT improved       Activity/rehab: pulmonary rehab starts on 4/6  Oxygen needs: RA  Pain management/RX: na  Diabetic management: na  Next Bronch due: 4/10, will need lovenox bridging, coumadin clinic aware   High risk donor:  Yes   CMV status: R+/D+  Valcyte stopped: at 3 month zachary, June 1st   DVT/PE: yes  AC/asa: coumadin  PJP prophylactic: bactrim  Other: she has raynauds's --fingers purple      Pt Education: medications (use/dose/side effects), how/when to call coordinator, frequency of labs, s/s of infection/rejection, call prior to starting any new medications, lab/vital sign book, prednisone taper  Health Maintenance:     Last colonoscopy:     Next colonoscopy due:     Dermatology: annually     Vaccinations this visit: na     Labs, CXR, PFTs reviewed with patient  Medication record reviewed and reconciled  Questions and concerns addressed    Patient Instructions  1. OK to change tacrolimus to to 5 mg in the AM and 6 mg in the PM  2. I'll figure out the rest of your medications    Next transplant clinic appointment: April 9th with CXR, labs and PFTs  Next lab draw: April 9th

## 2018-04-05 NOTE — PATIENT INSTRUCTIONS
Patient Instructions  1. OK to change tacrolimus to to 5 mg in the AM and 6 mg in the PM  2. I'll figure out the rest of your medications    Next transplant clinic appointment: April 9th with CXR, labs and PFTs  Next lab draw: April 9th

## 2018-04-05 NOTE — PROGRESS NOTES
Transplant Social Work Services Progress Note        Data: patient and spouse asked to meet with me following clinic appointment today.  They both report frustration with their out state specialty pharmacy that they must use do to insurance.  But they are working through issues with their insurance and see to be problem solving effectively.  Just frustrated.  They shared story of journey of last several week.s  Both are amazed and happy with patient progress.  Coping well. Supporting each other effectively.  Deny other psychosocial concerns.  Spouse asked to meet with me alone following visit with Kecia.  He is burdened by information that he has held back from Kecia about how very sick she was before transplant and decisions that he made on her behalf.  He is sharing it with her little by horace.      Intervention: supportive counseling.    Assessment: coping well.    Education provided by SW: n/a      Follow up Plan: encouraged calls as needed.  Continued support group attendance.  No immediate needs.

## 2018-04-05 NOTE — MR AVS SNAPSHOT
Kecia Blue   4/5/2018   Anticoagulation Therapy Visit    Description:  57 year old female   Provider:  Katie Bush, RN   Department:  [facility]           INR as of 4/5/2018     Today's INR 3.75!      Anticoagulation Summary as of 4/5/2018     INR goal 2.0-3.0   Today's INR 3.75!   Full instructions 4/5: Hold; 4/6: Hold; 4/7: Hold; 4/8: Hold; 4/9: Hold; Otherwise No maintenance plan   Next INR check 4/10/2018    Indications   Lung transplant recipient (H) [Z94.2]  Deep vein thrombosis (DVT) (H) [I82.409] [I82.409]         April 2018 Details    Sun Mon Tue Wed Thu Fri Sat     1               2               3               4               5      Hold   See details      6      Hold         7      Hold           8      Hold         9      Hold         10            11               12               13               14                 15               16               17               18               19               20               21                 22               23               24               25               26               27               28                 29               30                     Date Details   04/05 This INR check       Date of next INR:  4/10/2018         How to take your warfarin dose     Hold Do not take your warfarin dose. See the Details table to the right for additional instructions.

## 2018-04-05 NOTE — PROGRESS NOTES
Addendum:  Received an inbasket message from Evelyn in transplant.  Kecia is scheduled for a bronch on 4/10/18 and will need to bridge with lovenox.  Sent plan to Evelyn for approval.  Plan that was sent:  April 5:  Hold warfarin  April 6:  Hold warfarin, begin lovenox 60 mg in the PM  April 7:  Hold warfarin, lovenox 60 mg in AM and PM  April 8:  Hold warfarin, lovenox 60 mg in AM and PM  April 9:  Hold warfarin and lovenox  April 10:  Bronch:  Kecia will ask physician who does bronch when it is safe to restart her warfarin and lovenox.  She will continue on lovenox until INR is therapeutic x 2 readings.  Cr cl:  57.2     Weight:  56.36 kg          Will wait for response from Evelyn before calling patient with above plan.  Anju Meyers RN    This above plan was approved by Evelyn Wright from the transplant clinic.

## 2018-04-05 NOTE — LETTER
4/5/2018      RE: Kecia Blue  [address] [place]  [facility] [address]           [facility]  Pulmonary Medicine/Lung Transplant  April 5, 2018       Today's visit note:       ASSESSMENT/PLAN:  #Status post bilateral lung transplant, performed on March 1, 2018.  Pulmonary function tests and chest x-ray are improving.  Her current immunosuppressive regimen includes tacrolimus (target level adjusted today to 10-12 ng/mL), mycophenolate, and prednisone on a tapering schedule    #Preventive antimicrobials include Bactrim for PGP; nystatin for yeast; and valganciclovir for CMV (donor and recipient were CMV seropositive at the time of transplant, resulting in a 6 month course of prophylaxis    #Hypomagnesemia, on replacement    #Provoked left upper extremity DVT, with ultrasound March 7 demonstrating occlusive thrombus in the left subclavian vein, axillary vein, basilic vein in the upper arm, cephalic vein near junction with the subclavian vein and cephalic vein from mid arm to the wrist. On warfarin, goal INR 2-3.    #Dermatomyositis with Raynaud's phenomenon.  We will will make an appointment for her to see rheumatology to see if she should be on vasodilators or other management modalities    #Hypertension, being treated with metoprolol 25 mg twice daily    The patient will be seen in clinic again next week and will be scheduled for her first surveillance bronchoscopy with transbronchial biopsies        Please also refer to RN Transplant Coordinator note for additional information related to this visit.  PATIENT PROFILE AND TRANSPLANT HISTORY:  Kecia Blue is a 58 y/o female with h/o dermatomyositis) seronegative RA, ILD and pulmonary hypertension who was admitted for emergent lung transplant workup on 2/21. She progressed to V-A ECMO for right heart failure on 2/27 and subsequently received a BSLT on 3/1/18. Post operative course complicated by right-sided pleural effusion s/p  thoracentesis ~ 1.5 liters removed.     Transplant date(s):         3/1/2018 (Lung)  Transplant POD(s):        34  Lung transplant type(s): Bilateral single lung      Transplant coordinator:   Evelyn Wright  Transplant provider:        Alex Servin Stephen James Huddleston, Rade Tomic      INTERVAL HISTORY:  --Most recent resulted PRA:    3/1/18 neg for DSA  --Most recent bronchoscopy:     3/13/18: Surveillance bronchoscopy 3/13 with bilateral anastomoses intact and patent, thick yellow secretions around right anastomosis, suctioned and removed.  --Most recent Tbbx: N/A    --Most recent lung transplant clinic visit: 4/3/18 (Colleen)  --Interval signif medical events (obtained by chart review and patient hx): None    --Today:  Ms. Blue return to clinic today for a previously scheduled appointment.  She reports that she continues to do well from a respiratory standpoint.  Specifically she is not having shortness of breath with the limited amount of exertion that she does.  In that regard she is going to be starting pulmonary rehab tomorrow.  No chest pain that she was having earlier this week has resolved with Tylenol.  She is not having productive cough, hemoptysis, pleuritic chest pain, wheezing.  She is not having any losing from her sternotomy incision or chest tube sites.    REVIEW OF SYSTEMS:  #She is not having diarrhea or constipation    #Appetite is good and she is not having nausea    #She is not having  symptoms    #She has not had pain or ulcerations of the fingers.    #Complete review of systems was asked and was otherwise negative    PHYSICAL EXAM:  Vitals:    04/05/18 0916   BP: 132/88   Pulse: 103   Temp: 97.9  F (36.6  C)   TempSrc: Oral   Weight: 56 kg (123 lb 8 oz)     Constitutional:    Awake, alert and in no apparent distress breathing room air.   Eyes:    Anicteric, PERRL   ENT:    oral mucosa moist without lesions    Neck:    Supple without supraclavicular or cervical  lymphadenopathy    Lungs:    Good air entry both lungs. Few scattered crackles. No rhonchi. No wheezes.    Cardiovascular:    Normal S1 and S2. No JVD, murmur, rub, chavez. RSR   Abdomen:    NABS, soft, nontender, nondistended. No HSM.    Musculoskeletal:    No edema. Fingers pink and a little cool.    Neurologic:    Alert and conversant.    Skin:    Warm, dry. No rash seen.          Data:     I reviewed all resulted lab tests and imaging studies.    Results for orders placed or performed in visit on 04/03/18   General PFT Lab (Please always keep checked)   Result Value Ref Range    FVC-Pred 3.25 L    FVC-Pre 1.10 L    FVC-%Pred-Pre 33 %    FEV1-Pre 1.01 L    FEV1-%Pred-Pre 38 %    FEV1FVC-Pred 79 %    FEV1FVC-Pre 92 %    FEFMax-Pred 6.45 L/sec    FEFMax-Pre 2.91 L/sec    FEFMax-%Pred-Pre 45 %    FEF2575-Pred 2.46 L/sec    FEF2575-Pre 1.31 L/sec    JPV5327-%Pred-Pre 53 %    ExpTime-Pre 4.90 sec    FIFMax-Pre 2.75 L/sec    FEV1FEV6-Pred 81 %    FEV1FEV6-Pre 94 %       PULMONARY FUNCTION TEST INTERPRETATION:  Pulmonary function tests (read by me) show an FEV1 of  1.25 L and FVC of 1.4 L (48 and 43% of predicted, respectively). These tests are most consistent with a restrictive pulmonary function abnormality.  When compared with this patient's most previous tests, dated 4/3/2018, there have been significant improvements in both FEV1 and FVC.       Complexity indicators:    --immune compromised, on high-risk medications x   --organ transplant recipient x   --multiple organ transplant recipient    --active respiratory infection    --within one year of transplant; and/or within one month of hospitalization x   --chronic lung allograft dysfunction syndrome (CLAD, chronic rejection, or bronchiolitis obliterans syndrome)    --new medical problem addressed during this visit    --multiple active medical problems x   --admitted directly to hospital from this clinic visit    -->50% of this visit was spent in counseling and care  coordination. If yes, total visit time was              Medications:     Current Outpatient Prescriptions   Medication     polyethylene glycol (MIRALAX/GLYCOLAX) Packet     tacrolimus (GENERIC EQUIVALENT) 1 MG capsule     warfarin (COUMADIN) 1 MG tablet     levalbuterol (XOPENEX HFA) 45 MCG/ACT Inhaler     senna-docusate (SENOKOT-S;PERICOLACE) 8.6-50 MG per tablet     tacrolimus (GENERIC EQUIVALENT) 0.5 MG capsule     valGANciclovir (VALCYTE) 450 MG tablet     calcium-vitamin D (CALTRATE) 600-400 MG-UNIT per tablet     magnesium oxide (MAG-OX) 400 MG tablet     metoprolol tartrate (LOPRESSOR) 25 MG tablet     pantoprazole (PROTONIX) 40 MG EC tablet     sulfamethoxazole-trimethoprim (BACTRIM/SEPTRA) 400-80 MG per tablet     multivitamin, therapeutic with minerals (THERA-VIT-M) TABS tablet     nystatin (MYCOSTATIN) 273772 UNIT/ML suspension     predniSONE (DELTASONE) 5 MG tablet     tacrolimus (GENERIC EQUIVALENT) 0.5 MG capsule     mycophenolate (GENERIC EQUIVALENT) 250 MG capsule     order for DME     No current facility-administered medications for this visit.             Past Medical and Surgical History:     Past Medical History:   Diagnosis Date     Antisynthetase syndrome (H) 2014     Chronic cough      Dermatomyositis (H)      Dysplasia of cervix, low grade (ESTRADA 1)      ILD (interstitial lung disease) (H)      Osteopenia      Pulmonary hypertension      Raynaud's disease      Seronegative rheumatoid arthritis (H)      Past Surgical History:   Procedure Laterality Date     BRONCHOSCOPY FLEXIBLE N/A 3/13/2018    Procedure: BRONCHOSCOPY FLEXIBLE;  Flexible Bronchoscopy ;  Surgeon: Gissell Sanchez MD;  Location:  GI     ENT SURGERY      tonsillectomy as a child     INSERT EXTRACORPORAL MEMBRANE OXYGENATOR ADULT (OUTSIDE OR) N/A 2/27/2018    Procedure: INSERT EXTRACORPORAL MEMBRANE OXYGENATOR ADULT (OUTSIDE OR);  INSERT EXTRACORPORAL MEMBRANE OXYGENATOR ADULT (OUTSIDE OR) ;  Surgeon: Toby Hernandez  MD Jerry;  Location: [facility] OR     no prior surgery       REMOVE EXTRACORPORAL MEMBRANE OXYGENATOR ADULT N/A 3/3/2018    Procedure: REMOVE EXTRACORPORAL MEMBRANE OXYGENATOR ADULT;  Removal of Right Femoral Venous and Right Axillary Arterial Extracorporeal Membrane Oxygenator;  Surgeon: Toby Hernandez MD;  Location: [facility] OR     TRANSPLANT LUNG RECIPIENT SINGLE X2 Bilateral 3/1/2018    Procedure: TRANSPLANT LUNG RECIPIENT SINGLE X2;  Median Sternotomy, Extracorporeal Membrane Oxygenator, bilateral sequential lung transplant;  Surgeon: Toby Hernandez MD;  Location:  OR           Family History:     Family History   Problem Relation Age of Onset     Hypertension Mother      Arthritis Mother      Pancreatic Cancer Father      DIABETES Father             Social History:     Social History     Social History     Marital status:      Spouse name: N/A     Number of children: N/A     Years of education: N/A     Occupational History     Not on file.     Social History Main Topics     Smoking status: Never Smoker     Smokeless tobacco: Never Used     Alcohol use No     Drug use: No     Sexual activity: Not on file     Other Topics Concern     Parent/Sibling W/ Cabg, Mi Or Angioplasty Before 65f 55m? No     Social History Narrative       Srinivas Mcelroy MD

## 2018-04-05 NOTE — LETTER
4/5/2018       RE: Kecia Blue  [address] [place] [address]     Dear Colleague,    Thank you for referring your patient, Kecia Blue, to the [facility] TRANSPL[facility] at [facility]. Please see a copy of my visit note below.        [facility]  Pulmonary Medicine/Lung Transplant  April 5, 2018       Today's visit note:       ASSESSMENT/PLAN:  #Status post bilateral lung transplant, performed on March 1, 2018.  Pulmonary function tests and chest x-ray are improving.  Her current immunosuppressive regimen includes tacrolimus (target level adjusted today to 10-12 ng/mL), mycophenolate, and prednisone on a tapering schedule    #Preventive antimicrobials include Bactrim for PGP; nystatin for yeast; and valganciclovir for CMV (donor and recipient were CMV seropositive at the time of transplant, resulting in a 6 month course of prophylaxis    #Hypomagnesemia, on replacement    #Provoked left upper extremity DVT, with ultrasound March 7 demonstrating occlusive thrombus in the left subclavian vein, axillary vein, basilic vein in the upper arm, cephalic vein near junction with the subclavian vein and cephalic vein from mid arm to the wrist. On warfarin, goal INR 2-3.    #Dermatomyositis with Raynaud's phenomenon.  We will will make an appointment for her to see rheumatology to see if she should be on vasodilators or other management modalities    #Hypertension, being treated with metoprolol 25 mg twice daily    The patient will be seen in clinic again next week and will be scheduled for her first surveillance bronchoscopy with transbronchial biopsies        Please also refer to RN Transplant Coordinator note for additional information related to this visit.  PATIENT PROFILE AND TRANSPLANT HISTORY:  Kecia Blue is a 58 y/o female with h/o dermatomyositis) seronegative RA, ILD and pulmonary hypertension who was admitted for emergent  lung transplant workup on 2/21. She progressed to V-A ECMO for right heart failure on 2/27 and subsequently received a BSLT on 3/1/18. Post operative course complicated by right-sided pleural effusion s/p thoracentesis ~ 1.5 liters removed.     Transplant date(s):         3/1/2018 (Lung)  Transplant POD(s):        34  Lung transplant type(s): Bilateral single lung      Transplant coordinator:   Evelyn Wright  Transplant provider:        Elsy Mccann, Alex Hale, Nicho Khan      INTERVAL HISTORY:  --Most recent resulted PRA:    3/1/18 neg for DSA  --Most recent bronchoscopy:     3/13/18: Surveillance bronchoscopy 3/13 with bilateral anastomoses intact and patent, thick yellow secretions around right anastomosis, suctioned and removed.  --Most recent Tbbx: N/A    --Most recent lung transplant clinic visit: 4/3/18 ([facility]leen)  --Interval signif medical events (obtained by chart review and patient hx): None    --Today:  Ms. Blue return to clinic today for a previously scheduled appointment.  She reports that she continues to do well from a respiratory standpoint.  Specifically she is not having shortness of breath with the limited amount of exertion that she does.  In that regard she is going to be starting pulmonary rehab tomorrow.  No chest pain that she was having earlier this week has resolved with Tylenol.  She is not having productive cough, hemoptysis, pleuritic chest pain, wheezing.  She is not having any losing from her sternotomy incision or chest tube sites.    REVIEW OF SYSTEMS:  #She is not having diarrhea or constipation    #Appetite is good and she is not having nausea    #She is not having  symptoms    #She has not had pain or ulcerations of the fingers.    #Complete review of systems was asked and was otherwise negative    PHYSICAL EXAM:  Vitals:    04/05/18 0916   BP: 132/88   Pulse: 103   Temp: 97.9  F (36.6  C)   TempSrc: Oral   Weight: 56 kg (123 lb 8 oz)      Constitutional:    Awake, alert and in no apparent distress breathing room air.   Eyes:    Anicteric, PERRL   ENT:    oral mucosa moist without lesions    Neck:    Supple without supraclavicular or cervical lymphadenopathy    Lungs:    Good air entry both lungs. Few scattered crackles. No rhonchi. No wheezes.    Cardiovascular:    Normal S1 and S2. No JVD, murmur, rub, chavez. RSR   Abdomen:    NABS, soft, nontender, nondistended. No HSM.    Musculoskeletal:    No edema. Fingers pink and a little cool.    Neurologic:    Alert and conversant.    Skin:    Warm, dry. No rash seen.          Data:     I reviewed all resulted lab tests and imaging studies.    Results for orders placed or performed in visit on 04/03/18   General PFT Lab (Please always keep checked)   Result Value Ref Range    FVC-Pred 3.25 L    FVC-Pre 1.10 L    FVC-%Pred-Pre 33 %    FEV1-Pre 1.01 L    FEV1-%Pred-Pre 38 %    FEV1FVC-Pred 79 %    FEV1FVC-Pre 92 %    FEFMax-Pred 6.45 L/sec    FEFMax-Pre 2.91 L/sec    FEFMax-%Pred-Pre 45 %    FEF2575-Pred 2.46 L/sec    FEF2575-Pre 1.31 L/sec    [id number]-%Pred-Pre 53 %    ExpTime-Pre 4.90 sec    FIFMax-Pre 2.75 L/sec    FEV1FEV6-Pred 81 %    FEV1FEV6-Pre 94 %       PULMONARY FUNCTION TEST INTERPRETATION:  Pulmonary function tests (read by me) show an FEV1 of  1.25 L and FVC of 1.4 L (48 and 43% of predicted, respectively). These tests are most consistent with a restrictive pulmonary function abnormality.  When compared with this patient's most previous tests, dated 4/3/2018, there have been significant improvements in both FEV1 and FVC.       Complexity indicators:    --immune compromised, on high-risk medications x   --organ transplant recipient x   --multiple organ transplant recipient    --active respiratory infection    --within one year of transplant; and/or within one month of hospitalization x   --chronic lung allograft dysfunction syndrome (CLAD, chronic rejection, or bronchiolitis obliterans  syndrome)    --new medical problem addressed during this visit    --multiple active medical problems x   --admitted directly to hospital from this clinic visit    -->50% of this visit was spent in counseling and care coordination. If yes, total visit time was              Medications:     Current Outpatient Prescriptions   Medication     polyethylene glycol (MIRALAX/GLYCOLAX) Packet     tacrolimus (GENERIC EQUIVALENT) 1 MG capsule     warfarin (COUMADIN) 1 MG tablet     levalbuterol (XOPENEX HFA) 45 MCG/ACT Inhaler     senna-docusate (SENOKOT-S;PERICOLACE) 8.6-50 MG per tablet     tacrolimus (GENERIC EQUIVALENT) 0.5 MG capsule     valGANciclovir (VALCYTE) 450 MG tablet     calcium-vitamin D (CALTRATE) 600-400 MG-UNIT per tablet     magnesium oxide (MAG-OX) 400 MG tablet     metoprolol tartrate (LOPRESSOR) 25 MG tablet     pantoprazole (PROTONIX) 40 MG EC tablet     sulfamethoxazole-trimethoprim (BACTRIM/SEPTRA) 400-80 MG per tablet     multivitamin, therapeutic with minerals (THERA-VIT-M) TABS tablet     nystatin (MYCOSTATIN) 308816 UNIT/ML suspension     predniSONE (DELTASONE) 5 MG tablet     tacrolimus (GENERIC EQUIVALENT) 0.5 MG capsule     mycophenolate (GENERIC EQUIVALENT) 250 MG capsule     order for DME     No current facility-administered medications for this visit.             Past Medical and Surgical History:     Past Medical History:   Diagnosis Date     Antisynthetase syndrome (H) 2014     Chronic cough      Dermatomyositis (H)      Dysplasia of cervix, low grade (ESTRADA 1)      ILD (interstitial lung disease) (H)      Osteopenia      Pulmonary hypertension      Raynaud's disease      Seronegative rheumatoid arthritis (H)      Past Surgical History:   Procedure Laterality Date     BRONCHOSCOPY FLEXIBLE N/A 3/13/2018    Procedure: BRONCHOSCOPY FLEXIBLE;  Flexible Bronchoscopy ;  Surgeon: Gissell Sanchez MD;  Location:  GI     ENT SURGERY      tonsillectomy as a child     INSERT EXTRACORPORAL MEMBRANE  OXYGENATOR ADULT (OUTSIDE OR) N/A 2/27/2018    Procedure: INSERT EXTRACORPORAL MEMBRANE OXYGENATOR ADULT (OUTSIDE OR);  INSERT EXTRACORPORAL MEMBRANE OXYGENATOR ADULT (OUTSIDE OR) ;  Surgeon: Toby Hernandez MD;  Location:  OR     no prior surgery       REMOVE EXTRACORPORAL MEMBRANE OXYGENATOR ADULT N/A 3/3/2018    Procedure: REMOVE EXTRACORPORAL MEMBRANE OXYGENATOR ADULT;  Removal of Right Femoral Venous and Right Axillary Arterial Extracorporeal Membrane Oxygenator;  Surgeon: Toby Hernandez MD;  Location:  OR     TRANSPLANT LUNG RECIPIENT SINGLE X2 Bilateral 3/1/2018    Procedure: TRANSPLANT LUNG RECIPIENT SINGLE X2;  Median Sternotomy, Extracorporeal Membrane Oxygenator, bilateral sequential lung transplant;  Surgeon: Toby Hernandez MD;  Location:  OR           Family History:     Family History   Problem Relation Age of Onset     Hypertension Mother      Arthritis Mother      Pancreatic Cancer Father      DIABETES Father             Social History:     Social History     Social History     Marital status:      Spouse name: N/A     Number of children: N/A     Years of education: N/A     Occupational History     Not on file.     Social History Main Topics     Smoking status: Never Smoker     Smokeless tobacco: Never Used     Alcohol use No     Drug use: No     Sexual activity: Not on file     Other Topics Concern     Parent/Sibling W/ Cabg, Mi Or Angioplasty Before 65f 55m? No     Social History Narrative       Again, thank you for allowing me to participate in the care of your patient.      Sincerely,    Srinivas Mcelroy MD

## 2018-04-05 NOTE — MR AVS SNAPSHOT
After Visit Summary   4/5/2018    Kecia Blue    MRN: [id number]           Patient Information     Date Of Birth          1960        Visit Information        Provider Department      4/5/2018 10:00 AM Redd Ruiz LICSW [facility] Solid Organ Transplant        Today's Diagnoses     Lung transplant recipient (H)    -  1       Follow-ups after your visit        Your next 10 appointments already scheduled     Apr 06, 2018  2:00 PM CDT   Pulmonary Eval with  Pulmonary Rehab 1   [facility], [place], Cardiac Rehabilitation ([facility])    [address]  [facility] [address] [phone]            Apr 09, 2018  8:15 AM CDT   Lab with  LAB   [facility] ([facility])    [address] [place] [id number] [phone]            Apr 09, 2018  8:30 AM CDT   (Arrive by 8:15 AM)   XR CHEST 2 VIEWS with UCXR1   [facility] Xray ([facility])    [address] [place] [id number] [phone]           Please bring a list of your current medicines to your exam. (Include vitamins, minerals and over-thecounter medicines.) Leave your valuables at home.  Tell your doctor if there is a chance you may be pregnant.  You do not need to do anything special for this exam.            Apr 09, 2018 12:30 PM CDT   PFT VISIT with  PFL C   [facility] Pulmonary Function Testing ([facility])    [address] [place] [id number] [phone]            Apr 09, 2018  1:10 PM CDT   (Arrive by 12:55 PM)   Return Lung Transplant with Nicho Hood MD   [facility] Solid Organ Transplant ([facility])    [address] [place] [address] [id number] [phone]            Apr 10, 2018   Procedure with Mariposa Donohue MD   [facility],  [place], [facility] ([facility], [facility])    [address]  [place] [address]   [phone]           The [facility] is located on the corner of [facility] and [facility] on the [facility]. It is easily accessible from virtually any point in the [place] area, via [address] and [address].              Future tests that were ordered for you today     Open Future Orders        Priority Expected Expires Ordered    INR Routine 4/9/2018 5/5/2018 4/5/2018    INR Routine 4/9/2018 5/5/2018 4/5/2018            Who to contact     If you have questions or need follow up information about today's clinic visit or your schedule please contact [facility]GAN TRANSPLANT directly at [phone].  Normal or non-critical lab and imaging results will be communicated to you by xTurionhart, letter or phone within 4 business days after the clinic has received the results. If you do not hear from us within 7 days, please contact the clinic through xTurionhart or phone. If you have a critical or abnormal lab result, we will notify you by phone as soon as possible.  Submit refill requests through [employer] Style or call your pharmacy and they will forward the refill request to us. Please allow 3 business days for your refill to be completed.          Additional Information About Your Visit        xTurionhart Information     [employer] Style gives you secure access to your electronic health record. If you see a primary care provider, you can also send messages to your care team and make appointments. If you have questions, please call your primary care clinic.  If you do not have a primary care provider, please call [phone] and they will assist you.        Care EveryWhere ID     This is your Care EveryWhere ID. This could be used by other organizations to access your [place] medical records  [id number]         Blood Pressure from Last 3 Encounters:   04/05/18 132/88   04/03/18  126/89   03/30/18 (!) 131/92    Weight from Last 3 Encounters:   04/05/18 56 kg (123 lb 8 oz)   04/03/18 56.2 kg (124 lb)   03/24/18 54.4 kg (120 lb)              Today, you had the following     No orders found for display         Today's Medication Changes          These changes are accurate as of 4/5/18 11:51 AM.  If you have any questions, ask your nurse or doctor.               These medicines have changed or have updated prescriptions.        Dose/Directions    nystatin 241038 UNIT/ML suspension   Commonly known as:  MYCOSTATIN   This may have changed:    - how much to take  - additional instructions   Used for:  Lung transplant recipient (H)   Changed by:  Srinivas Mcelroy MD        Dose:  10 mL   Take 10 mLs (1,000,000 Units) by mouth 4 times daily   Quantity:  1200 mL   Refills:  5       * tacrolimus 1 MG capsule   Commonly known as:  GENERIC EQUIVALENT   This may have changed:    - how much to take  - how to take this  - when to take this  - additional instructions  - Another medication with the same name was removed. Continue taking this medication, and follow the directions you see here.   Used for:  S/P lung transplant (H), Other constipation   Changed by:  Srinivas Mcelroy MD        Dose:  5 mg   Take 5 capsules (5 mg) by mouth 2 times daily Total dose 5.5 mg twice daily   Quantity:  300 capsule   Refills:  11       * tacrolimus 0.5 MG capsule   Commonly known as:  GENERIC EQUIVALENT   This may have changed:    - how much to take  - how to take this  - when to take this  - additional instructions  - Another medication with the same name was removed. Continue taking this medication, and follow the directions you see here.   Used for:  S/P lung transplant (H), Other constipation   Changed by:  Srinivas Mcelroy MD        Dose:  0.5 mg   Take 1 capsule (0.5 mg) by mouth 2 times daily Total dose 5.5 mg twice daily   Quantity:  120 capsule   Refills:  3       * valGANciclovir 450 MG  tablet   Commonly known as:  VALCYTE   Indication:  Medication Treatment to Prevent Cytomegalovirus Disease   This may have changed:  how much to take   Used for:  S/P lung transplant (H)   Changed by:  Srinivas Mcelroy MD        Dose:  900 mg   Take 2 tablets (900 mg) by mouth daily   Quantity:  60 tablet   Refills:  6       * valGANciclovir 450 MG tablet   Commonly known as:  VALCYTE   This may have changed:  You were already taking a medication with the same name, and this prescription was added. Make sure you understand how and when to take each.   Used for:  S/P lung transplant (H), CMV (cytomegalovirus infection) (H)   Changed by:  Srinivas Mcelroy MD        Dose:  900 mg   Take 2 tablets (900 mg) by mouth daily   Quantity:  10 tablet   Refills:  0       * Notice:  This list has 4 medication(s) that are the same as other medications prescribed for you. Read the directions carefully, and ask your doctor or other care provider to review them with you.         Where to get your medicines      These medications were sent to [address] [address]    Hours:  TRANSPLANT PHONE NUMBER [phone] Phone:  [phone]     nystatin 622819 UNIT/ML suspension    valGANciclovir 450 MG tablet         These medications were sent to [facility] [address]  [address]  [address]  [address] [place] [address]     Phone:  [phone]     mycophenolate 250 MG capsule    tacrolimus 0.5 MG capsule    tacrolimus 1 MG capsule    valGANciclovir 450 MG tablet                Primary Care Provider Office Phone # Fax #    Rosy Vu [phone]       [facility]  [address]  Critical access hospital [address]        Equal Access to Services     ALBAN VALENCIA AH: Sahara Lucas, yasmin sierra, morro rowan  pennie houghkelli iqbal'aan ah. So [facility] [place] [facility] [phone].    ATENCIÓN: Si kylela jordana, tiene a taylor disposición servicios gratuitos de asistencia lingüística. Zachary al [phone].    We comply with applicable federal civil rights laws and [place] laws. We do not discriminate on the basis of race, color, national origin, age, disability, sex, sexual orientation, or gender identity.            Thank you!     Thank you for choosing [facility]GAN TRANSPL[facility]  for your care. Our goal is always to provide you with excellent care. Hearing back from our patients is one way we can continue to improve our services. Please take a few minutes to complete the written survey that you may receive in the mail after your visit with us. Thank you!             Your Updated Medication List - Protect others around you: Learn how to safely use, store and throw away your medicines at www.disposemymeds.org.          This list is accurate as of 4/5/18 11:52 AM.  Always use your most recent med list.                   Brand Name Dispense Instructions for use Diagnosis    calcium-vitamin D 600-400 MG-UNIT per tablet    CALTRATE    60 tablet    Take 1 tablet by mouth 2 times daily (with meals)    S/P lung transplant (H)       levalbuterol 45 MCG/ACT Inhaler    XOPENEX HFA    1 Inhaler    Inhale 2 puffs into the lungs every 6 hours as needed for shortness of breath / dyspnea or wheezing    S/P lung transplant (H), Other constipation       magnesium oxide 400 MG tablet    MAG-OX    30 tablet    Take 1 tablet (400 mg) by mouth daily    Hypomagnesemia       metoprolol tartrate 25 MG tablet    LOPRESSOR    60 tablet    Take 1 tablet (25 mg) by mouth 2 times daily    Paroxysmal atrial fibrillation (H)       multivitamin, therapeutic with minerals Tabs tablet     30 each    Take 1 tablet by mouth daily    Lung transplant recipient (H)       mycophenolate 250 MG capsule    GENERIC EQUIVALENT    360 capsule    Take 6 capsules (1,500 mg) by  mouth 2 times daily    S/P lung transplant (H)       nystatin 814230 UNIT/ML suspension    MYCOSTATIN    1200 mL    Take 10 mLs (1,000,000 Units) by mouth 4 times daily    Lung transplant recipient (H)       order for DME     1 Device    Equipment being ordered: Oxygen 5 liters at rest, 15 liters with exertion.  Needs portability.  Please provide 2 10-liter concentrators for in the home    ILD (interstitial lung disease) (H), Hypoxia       pantoprazole 40 MG EC tablet    PROTONIX    30 tablet    Take 1 tablet (40 mg) by mouth every morning    Gastroesophageal reflux disease without esophagitis       polyethylene glycol Packet    MIRALAX/GLYCOLAX    60 packet    Take 17 g by mouth 2 times daily as needed for constipation    Other constipation       predniSONE 5 MG tablet    DELTASONE    300 tablet    DateAM (mg)PM (mg) 3/22/1812.510 4/26/181010 5/24/18107.5 6/22/187.57.5 7/20/187.55 8/24/1855 9/21/1852.5    S/P lung transplant (H)       senna-docusate 8.6-50 MG per tablet    SENOKOT-S;PERICOLACE    100 tablet    Take 2 tablets by mouth 2 times daily as needed for constipation    Other constipation, S/P lung transplant (H)       sulfamethoxazole-trimethoprim 400-80 MG per tablet    BACTRIM/SEPTRA    30 tablet    1 tablet by Oral or NG Tube route daily    Organ transplant candidate, Lung disease, interstitial (H), Abnormal chest CT, Hypotension, unspecified hypotension type, Acute on chronic respiratory failure with hypoxia (H), ILD (interstitial lung disease) (H)       * tacrolimus 1 MG capsule    GENERIC EQUIVALENT    300 capsule    Take 5 capsules (5 mg) by mouth 2 times daily Total dose 5.5 mg twice daily    S/P lung transplant (H), Other constipation       * tacrolimus 0.5 MG capsule    GENERIC EQUIVALENT    120 capsule    Take 1 capsule (0.5 mg) by mouth 2 times daily Total dose 5.5 mg twice daily    S/P lung transplant (H), Other constipation       * valGANciclovir 450 MG tablet    VALCYTE    60 tablet    Take 2  tablets (900 mg) by mouth daily    S/P lung transplant (H)       * valGANciclovir 450 MG tablet    VALCYTE    10 tablet    Take 2 tablets (900 mg) by mouth daily    S/P lung transplant (H), CMV (cytomegalovirus infection) (H)       warfarin 1 MG tablet    COUMADIN    100 tablet    2-4mg daily, adjusted by coumadin clinic    Lung transplant recipient (H), Paroxysmal atrial fibrillation (H)       * Notice:  This list has 4 medication(s) that are the same as other medications prescribed for you. Read the directions carefully, and ask your doctor or other care provider to review them with you.

## 2018-04-05 NOTE — NURSING NOTE
Chief Complaint   Patient presents with     RECHECK     Post Lung TXP   Pt roomed, vitals, meds, and allergies reviewed with pt. Pt ready for provider.  Duane Chand, CMA

## 2018-04-05 NOTE — PROGRESS NOTES
[facility]  Pulmonary Medicine/Lung Transplant  April 5, 2018       Today's visit note:       ASSESSMENT/PLAN:  #Status post bilateral lung transplant, performed on March 1, 2018.  Pulmonary function tests and chest x-ray are improving.  Her current immunosuppressive regimen includes tacrolimus (target level adjusted today to 10-12 ng/mL), mycophenolate, and prednisone on a tapering schedule    #Preventive antimicrobials include Bactrim for PGP; nystatin for yeast; and valganciclovir for CMV (donor and recipient were CMV seropositive at the time of transplant, resulting in a 6 month course of prophylaxis    #Hypomagnesemia, on replacement    #Provoked left upper extremity DVT, with ultrasound March 7 demonstrating occlusive thrombus in the left subclavian vein, axillary vein, basilic vein in the upper arm, cephalic vein near junction with the subclavian vein and cephalic vein from mid arm to the wrist. On warfarin, goal INR 2-3.    #Dermatomyositis with Raynaud's phenomenon.  We will will make an appointment for her to see rheumatology to see if she should be on vasodilators or other management modalities    #Hypertension, being treated with metoprolol 25 mg twice daily    The patient will be seen in clinic again next week and will be scheduled for her first surveillance bronchoscopy with transbronchial biopsies        Please also refer to RN Transplant Coordinator note for additional information related to this visit.  PATIENT PROFILE AND TRANSPLANT HISTORY:  Kecia Blue is a 58 y/o female with h/o dermatomyositis) seronegative RA, ILD and pulmonary hypertension who was admitted for emergent lung transplant workup on 2/21. She progressed to V-A ECMO for right heart failure on 2/27 and subsequently received a BSLT on 3/1/18. Post operative course complicated by right-sided pleural effusion s/p thoracentesis ~ 1.5 liters removed.     Transplant date(s):         3/1/2018  (Lung)  Transplant POD(s):        34  Lung transplant type(s): Bilateral single lung      Transplant coordinator:   Evelyn Wright  Transplant provider:        Elsy Mccann, Alex Hale, Nicho Khan      INTERVAL HISTORY:  --Most recent resulted PRA:    3/1/18 neg for DSA  --Most recent bronchoscopy:     3/13/18: Surveillance bronchoscopy 3/13 with bilateral anastomoses intact and patent, thick yellow secretions around right anastomosis, suctioned and removed.  --Most recent Tbbx: N/A    --Most recent lung transplant clinic visit: 4/3/18 ([facility])  --Interval signif medical events (obtained by chart review and patient hx): None    --Today:  Ms. Blue return to clinic today for a previously scheduled appointment.  She reports that she continues to do well from a respiratory standpoint.  Specifically she is not having shortness of breath with the limited amount of exertion that she does.  In that regard she is going to be starting pulmonary rehab tomorrow.  No chest pain that she was having earlier this week has resolved with Tylenol.  She is not having productive cough, hemoptysis, pleuritic chest pain, wheezing.  She is not having any losing from her sternotomy incision or chest tube sites.    REVIEW OF SYSTEMS:  #She is not having diarrhea or constipation    #Appetite is good and she is not having nausea    #She is not having  symptoms    #She has not had pain or ulcerations of the fingers.    #Complete review of systems was asked and was otherwise negative    PHYSICAL EXAM:  Vitals:    04/05/18 0916   BP: 132/88   Pulse: 103   Temp: 97.9  F (36.6  C)   TempSrc: Oral   Weight: 56 kg (123 lb 8 oz)     Constitutional:    Awake, alert and in no apparent distress breathing room air.   Eyes:    Anicteric, PERRL   ENT:    oral mucosa moist without lesions    Neck:    Supple without supraclavicular or cervical lymphadenopathy    Lungs:    Good air entry both lungs. Few scattered  crackles. No rhonchi. No wheezes.    Cardiovascular:    Normal S1 and S2. No JVD, murmur, rub, chavez. RSR   Abdomen:    NABS, soft, nontender, nondistended. No HSM.    Musculoskeletal:    No edema. Fingers pink and a little cool.    Neurologic:    Alert and conversant.    Skin:    Warm, dry. No rash seen.          Data:     I reviewed all resulted lab tests and imaging studies.    Results for orders placed or performed in visit on 04/03/18   General PFT Lab (Please always keep checked)   Result Value Ref Range    FVC-Pred 3.25 L    FVC-Pre 1.10 L    FVC-%Pred-Pre 33 %    FEV1-Pre 1.01 L    FEV1-%Pred-Pre 38 %    FEV1FVC-Pred 79 %    FEV1FVC-Pre 92 %    FEFMax-Pred 6.45 L/sec    FEFMax-Pre 2.91 L/sec    FEFMax-%Pred-Pre 45 %    FEF2575-Pred 2.46 L/sec    FEF2575-Pre 1.31 L/sec    WHL0454-%Pred-Pre 53 %    ExpTime-Pre 4.90 sec    FIFMax-Pre 2.75 L/sec    FEV1FEV6-Pred 81 %    FEV1FEV6-Pre 94 %       PULMONARY FUNCTION TEST INTERPRETATION:  Pulmonary function tests (read by me) show an FEV1 of  1.25 L and FVC of 1.4 L (48 and 43% of predicted, respectively). These tests are most consistent with a restrictive pulmonary function abnormality.  When compared with this patient's most previous tests, dated 4/3/2018, there have been significant improvements in both FEV1 and FVC.       Complexity indicators:    --immune compromised, on high-risk medications x   --organ transplant recipient x   --multiple organ transplant recipient    --active respiratory infection    --within one year of transplant; and/or within one month of hospitalization x   --chronic lung allograft dysfunction syndrome (CLAD, chronic rejection, or bronchiolitis obliterans syndrome)    --new medical problem addressed during this visit    --multiple active medical problems x   --admitted directly to hospital from this clinic visit    -->50% of this visit was spent in counseling and care coordination. If yes, total visit time was              Medications:      Current Outpatient Prescriptions   Medication     polyethylene glycol (MIRALAX/GLYCOLAX) Packet     tacrolimus (GENERIC EQUIVALENT) 1 MG capsule     warfarin (COUMADIN) 1 MG tablet     levalbuterol (XOPENEX HFA) 45 MCG/ACT Inhaler     senna-docusate (SENOKOT-S;PERICOLACE) 8.6-50 MG per tablet     tacrolimus (GENERIC EQUIVALENT) 0.5 MG capsule     valGANciclovir (VALCYTE) 450 MG tablet     calcium-vitamin D (CALTRATE) 600-400 MG-UNIT per tablet     magnesium oxide (MAG-OX) 400 MG tablet     metoprolol tartrate (LOPRESSOR) 25 MG tablet     pantoprazole (PROTONIX) 40 MG EC tablet     sulfamethoxazole-trimethoprim (BACTRIM/SEPTRA) 400-80 MG per tablet     multivitamin, therapeutic with minerals (THERA-VIT-M) TABS tablet     nystatin (MYCOSTATIN) 050891 UNIT/ML suspension     predniSONE (DELTASONE) 5 MG tablet     tacrolimus (GENERIC EQUIVALENT) 0.5 MG capsule     mycophenolate (GENERIC EQUIVALENT) 250 MG capsule     order for DME     No current facility-administered medications for this visit.             Past Medical and Surgical History:     Past Medical History:   Diagnosis Date     Antisynthetase syndrome (H) 2014     Chronic cough      Dermatomyositis (H)      Dysplasia of cervix, low grade (ESTRADA 1)      ILD (interstitial lung disease) (H)      Osteopenia      Pulmonary hypertension      Raynaud's disease      Seronegative rheumatoid arthritis (H)      Past Surgical History:   Procedure Laterality Date     BRONCHOSCOPY FLEXIBLE N/A 3/13/2018    Procedure: BRONCHOSCOPY FLEXIBLE;  Flexible Bronchoscopy ;  Surgeon: Gissell Sanchez MD;  Location:  GI     ENT SURGERY      tonsillectomy as a child     INSERT EXTRACORPORAL MEMBRANE OXYGENATOR ADULT (OUTSIDE OR) N/A 2/27/2018    Procedure: INSERT EXTRACORPORAL MEMBRANE OXYGENATOR ADULT (OUTSIDE OR);  INSERT EXTRACORPORAL MEMBRANE OXYGENATOR ADULT (OUTSIDE OR) ;  Surgeon: Toby Hernandez MD;  Location:  OR     no prior surgery       REMOVE  EXTRACORPORAL MEMBRANE OXYGENATOR ADULT N/A 3/3/2018    Procedure: REMOVE EXTRACORPORAL MEMBRANE OXYGENATOR ADULT;  Removal of Right Femoral Venous and Right Axillary Arterial Extracorporeal Membrane Oxygenator;  Surgeon: Toby Hernandez MD;  Location: [facility] OR     TRANSPLANT LUNG RECIPIENT SINGLE X2 Bilateral 3/1/2018    Procedure: TRANSPLANT LUNG RECIPIENT SINGLE X2;  Median Sternotomy, Extracorporeal Membrane Oxygenator, bilateral sequential lung transplant;  Surgeon: Toby Hernandez MD;  Location:  OR           Family History:     Family History   Problem Relation Age of Onset     Hypertension Mother      Arthritis Mother      Pancreatic Cancer Father      DIABETES Father             Social History:     Social History     Social History     Marital status:      Spouse name: N/A     Number of children: N/A     Years of education: N/A     Occupational History     Not on file.     Social History Main Topics     Smoking status: Never Smoker     Smokeless tobacco: Never Used     Alcohol use No     Drug use: No     Sexual activity: Not on file     Other Topics Concern     Parent/Sibling W/ Cabg, Mi Or Angioplasty Before 65f 55m? No     Social History Narrative

## 2018-04-06 ENCOUNTER — TELEPHONE (OUTPATIENT)
Dept: TRANSPLANT | Facility: CLINIC | Age: 58
End: 2018-04-06

## 2018-04-06 ENCOUNTER — HOSPITAL ENCOUNTER (OUTPATIENT)
Dept: CARDIAC REHAB | Facility: CLINIC | Age: 58
End: 2018-04-06
Attending: PHYSICAL MEDICINE & REHABILITATION
Payer: COMMERCIAL

## 2018-04-06 VITALS — WEIGHT: 123.5 LBS | HEIGHT: 64 IN | BODY MASS INDEX: 21.08 KG/M2

## 2018-04-06 DIAGNOSIS — Z94.2 LUNG TRANSPLANT RECIPIENT (H): ICD-10-CM

## 2018-04-06 LAB
CMV DNA SPEC NAA+PROBE-ACNC: NORMAL [IU]/ML
CMV DNA SPEC NAA+PROBE-LOG#: NORMAL {LOG_IU}/ML
DONOR IDENTIFICATION: NORMAL
DQB7: 2273
DSA COMMENTS: NORMAL
DSA PRESENT: YES
DSA TEST METHOD: NORMAL
ORGAN: NORMAL
PRA DONOR SPECIFIC ABY: NORMAL
SA1 CELL: NORMAL
SA1 COMMENTS: NORMAL
SA1 HI RISK ABY: NORMAL
SA1 MOD RISK ABY: NORMAL
SA1 TEST METHOD: NORMAL
SA2 CELL: NORMAL
SA2 COMMENTS: NORMAL
SA2 HI RISK ABY UA: NORMAL
SA2 MOD RISK ABY: NORMAL
SA2 TEST METHOD: NORMAL
SPECIMEN SOURCE: NORMAL

## 2018-04-06 PROCEDURE — G0238 OTH RESP PROC, INDIV: HCPCS

## 2018-04-06 PROCEDURE — 40000244 ZZH STATISTIC VISIT PULM REHAB

## 2018-04-06 ASSESSMENT — PULMONARY FUNCTION TESTS
FEV1/FVC: 92
FEV1 (%PREDICTED): 38
FVC_PERCENT_PREDICTED: 33
FEV1: 1.01
FVC: 1.1

## 2018-04-06 ASSESSMENT — 6 MINUTE WALK TEST (6MWT)
MALE CALC: 537.14
TOTAL DISTANCE WALKED: 167.64
FEMALE CALC: 560.89
GENDER SELECTION: FEMALE

## 2018-04-06 ASSESSMENT — DUKE ACTIVITY SCORE INDEX (DASI)
DASI METS SCORE: 6.45
VO2_PEAK: 22.59

## 2018-04-06 ASSESSMENT — ACTIVITIES OF DAILY LIVING (ADL): ADL_LIMITATIONS: STAIR CLIMBING

## 2018-04-06 NOTE — PROGRESS NOTES
04/06/18 1200   Session   Session Initial Evaluation and Exercise Prescription   Certified through this date 05/05/18   Type Initial   General Information   Treatment Diagnosis Lung transplant   Classification of COPD NA   Onset Date 03/01/18   Hospital Location [facility], [facility] Discharge Date 03/24/18   Outpatient Pulmonary Rehab Start Date 04/06/18   Primary Physician Rosy Anne   Pulmonologist Lopez Macias   Other Specialty Provider Evelyn Wright   Medical History/Co morbidities   Medical History Comments Raynaud's, dermatomyositis, LUE DVT on blood thinner, osteopenia   Sputum   Sputum Production Amount None   Tobacco History   Tobacco Never smoker   Medications   Long-Acting Beta Agonist Not prescribed   Short-Acting Beta Agonist Not prescribed   Long-Acting Anticholinergic Not prescribed   Short-Acting Anticholinergic Not prescribed   Inhaled Corticosteroid Not prescribed   Oral Corticosteroid Prescribed, taking as prescribed   Medications Reconciled By Medical record;Patient   Preventative Vaccinations   Influenza Vaccination Yes   Pneumonia Vaccination Yes   Pain   Patient Currently in Pain Yes   Pain Location Incisional   Pain Rating 5/10   Pain Description Ache   Fall Risk Screen   Fall screen completed by Pulmonary Rehab   Have you fallen 2 or more times in the past year? No   Have you fallen and had an injury in the past year? No   Living and Work Status   Living Arrangements apartment   Support System Live with an adult   Environmental Factors No concerns   ADL Limitations Stair climbing   Initial Duke Activity Status Index (DASI) score. A measure of functional capacity. The goal is to have a pre-program raw score of 9.95 (~4 METs) or above 30.2   Initial DASI VO2 Peak (ml*kg-1*min-1) 22.59   Initial DASI MET Level 6.45   Occupation    Return to Employment Yes   Physical Assessments   Edema +2 Mild   Left Lung Sounds normal   Right  "Lung Sounds normal;other (see comments)  (slight rhonchi/crackles in bases)   Pulmonary Function Test (PFTs)   PFT Results Available   Date Completed 04/03/18   FVC Actual 1.1   % Predicted FVC 33   FEV1 Actual 1.01   % Predicted FEV1 38   FEV1/FEV Ratio 92   Pre/Post Bronchodilator Pre   Airway Obstruction NA   Individualized Treatment Plan   Sessions Scheduled 18   Sessions Attended 1   Type Aerobic exercise;Resistance training;Flexibility training   Oxygen Use   Supplemental Oxygen Needed No   Exercise Prescription   Mode Treadmill;NuStep;Weights;Sci-Fit;Ambulation   Frequency 2 days/week   Duration/Time 15-30 min;Intermittent bouts   THR (85% of age predicted max heart rate)  140.25   Effort Rating (0-10) 4-6/10   Progression of Exercise Increase 0.15-0.25 METs per week   Oxygen Titration with Exercise > 88% with exercise   Exercise Assessment   6 Minute Walk Predicted - Gender Selection Female   6 Minute Walk Predicted (Female) 560.89   6 Minute Walk Predicted (Male) 537.14   6 Minute Walk Distance (Initial) 167.64 Meters   Resting    Exercise    Resting /72   Exercise /68   SpO2 99   Exercise SpO2 98   Current MET level 1.8   Exercise Tolerance poor   Normal Limits Discussed Yes   Current Symptoms at Home Dyspnea;Fatigue   Current Symptoms in Rehab Dyspnea;Fatigue   Limitations deconditioned   Nutrition Management   Age 57   Height 1.613 m (5' 3.5\")   Weight 56 kg (123 lb 8 oz)   BMI (Calculated) 21.58   Assessment Normal   Interventions Planned NA   Psychosocial   Initial Patient Health Questionnaire -9 (PHQ-9) for depression. To notify physician if pre-score >9. 5   Initial COPD Assessment Test (CAT). A quality of life measure. Scores range from 0-40. Higher scores indicate greater levels of limitations. The goal is to reduce scores pre to post program. 19   Initial Shortness of Breath Questionnaire (SOBQ) score. The goal is to reduce the score pre to post program. 42   Intervention " Planned Use breathing techniques to control dyspnea cycle   Stages of Change   Aerobic Exercise Preparation   Physical Activity Preparation   Recommended diet Preparation   Stress Preparation   Smoking Cessation NA   Oxygen Usage NA   Current Home Exercise   Type of Exercise None   Frequency (days per week) 0   Duration (minutes per session) 0   Recommended Home Exercise Prescription   Type of Exercise Walking;LE Strengthening Exercise   Frequency (Days per week) 1   Duration (minutes per session) 15-30 min   Effort Rating Recommended (0-10) Scale  4-6/10   Learning Assessment   Learner Patient   Primary Language English   Preferred Learning Style Listening;Reading;Demonstration;Pictures/Video   Barriers to Learning No barriers noted   Patient Education/Referrals   Education Recommended Breathing Techniques;Panic and Anxiety   Follow-up/On-going Support   Provider follow-up needed on the following No follow-up needed   Pulmonary Rehab Goals   Pulmonary Rehab Goals 1   Goal 1   Goal Increase endurance to be able to tolerate walking 0.5 mile and increase performance of ADLS (stair climbing) by establishing an exercise routine 3 days/week   Target Date 07/06/18   Assessment   Assessment Patient is a pleasant 57 year-old, female status post bilateral lung transplant on 3/1/18 for ILD, and history of osteopenia, dermatomyositis.  Patient was admitted for emergent lung transplant on 2/21/18. Patient stated that she is doing well post hospitalization and stated that she is deconditioned. Patient appears highly motivated to attend rehab to improve endurance and muscle strength to improve quality of life to return home. Patient performed 6 minute walk test today but did need to stop for 1 minute and 30 seconds due to fatigue/dyspnea. Coached patient on performing pursed lip breathing technique to control dyspnea, with handout given to patient to practice. Skilled therapy is recommended to monitor cardiopulmonary response to  exercise, and assist patient in establishing an exercise routine to improve quality of life.   The patient was assessed to be stable and appropriate to begin exercise.  The patient's functional capacity and exercise prescription were determined by the completion of the 6 minute walk test. See results above.  The patient was orientated to the program and the equipment. Pulmonary response to exercise was assessed and WNL. No symptoms, complaints or pain were reported.   Goals and objectives were discussed. Good prognosis for reaching goals.   Skilled therapy is necessary to monitor pulmonary response to exercise, provide education, and provide behavior change counseling to achieve patient's goals.     I have reviewed initial evaluation outcomes, and agree with exercise prescription for respiratory therapy for this patient      45 minute total session time, spent 1:1 with patient: performing 6 MWT, instructing PLB technique, assessing performance of ADLs, and discussing strategies to establish an exercise routine to achieve goals.

## 2018-04-06 NOTE — TELEPHONE ENCOUNTER
Tacrolimus level 18.1 at 11 hours, on 4/5  Goal 10-15.   Current dose 5.5 mg in AM, 5.5 mg in PM    Dose changed to  5 mg in AM, 5 mg in PM   Recheck level in 4 days    Discussed with patient

## 2018-04-06 NOTE — TELEPHONE ENCOUNTER
Provider Call: Provider Call: Insurance  Route to [facility]  Facility Name: [facility]  Facility Location:   Reason for Call: Prior Authorization- per Pharmacy- Insurance wants orders for 5 mg tablets- not 1 mg tablets  Callback needed? Yes  Return Call Needed  Same as documented in contacts section  When to return call?: Same day: Route High Priority

## 2018-04-09 ENCOUNTER — HOSPITAL ENCOUNTER (OUTPATIENT)
Facility: CLINIC | Age: 58
Setting detail: SPECIMEN
Discharge: HOME | End: 2018-04-09
Admitting: PHYSICIAN ASSISTANT
Payer: COMMERCIAL

## 2018-04-09 ENCOUNTER — OFFICE VISIT (OUTPATIENT)
Dept: TRANSPLANT | Facility: CLINIC | Age: 58
End: 2018-04-09
Attending: INTERNAL MEDICINE
Payer: COMMERCIAL

## 2018-04-09 ENCOUNTER — ANTICOAGULATION THERAPY VISIT (OUTPATIENT)
Dept: ANTICOAGULATION | Facility: CLINIC | Age: 58
End: 2018-04-09

## 2018-04-09 ENCOUNTER — RADIANT APPOINTMENT (OUTPATIENT)
Dept: GENERAL RADIOLOGY | Facility: CLINIC | Age: 58
End: 2018-04-09
Attending: PHYSICIAN ASSISTANT
Payer: COMMERCIAL

## 2018-04-09 ENCOUNTER — RADIANT APPOINTMENT (OUTPATIENT)
Dept: ULTRASOUND IMAGING | Facility: CLINIC | Age: 58
End: 2018-04-09
Attending: INTERNAL MEDICINE
Payer: COMMERCIAL

## 2018-04-09 VITALS
SYSTOLIC BLOOD PRESSURE: 128 MMHG | BODY MASS INDEX: 21.1 KG/M2 | HEART RATE: 102 BPM | TEMPERATURE: 97.9 F | WEIGHT: 123.6 LBS | OXYGEN SATURATION: 100 % | HEIGHT: 64 IN | DIASTOLIC BLOOD PRESSURE: 83 MMHG

## 2018-04-09 DIAGNOSIS — I82.622 ACUTE DEEP VEIN THROMBOSIS (DVT) OF LEFT UPPER EXTREMITY, UNSPECIFIED VEIN (H): ICD-10-CM

## 2018-04-09 DIAGNOSIS — Z94.2 LUNG TRANSPLANT RECIPIENT (H): ICD-10-CM

## 2018-04-09 DIAGNOSIS — Z94.2 LUNG REPLACED BY TRANSPLANT (H): ICD-10-CM

## 2018-04-09 DIAGNOSIS — Z94.2 LUNG TRANSPLANT RECIPIENT (H): Primary | ICD-10-CM

## 2018-04-09 DIAGNOSIS — I82.409 DEEP VEIN THROMBOSIS (DVT) (H): ICD-10-CM

## 2018-04-09 DIAGNOSIS — Z94.2 LUNG REPLACED BY TRANSPLANT (H): Primary | ICD-10-CM

## 2018-04-09 LAB
ANION GAP SERPL CALCULATED.3IONS-SCNC: 7 MMOL/L (ref 3–14)
BUN SERPL-MCNC: 20 MG/DL (ref 7–30)
CALCIUM SERPL-MCNC: 9 MG/DL (ref 8.5–10.1)
CHLORIDE SERPL-SCNC: 104 MMOL/L (ref 94–109)
CO2 SERPL-SCNC: 27 MMOL/L (ref 20–32)
CREAT SERPL-MCNC: 1 MG/DL (ref 0.52–1.04)
ERYTHROCYTE [DISTWIDTH] IN BLOOD BY AUTOMATED COUNT: 18.1 % (ref 10–15)
EXPTIME-PRE: 7.36 SEC
FEF2575-%PRED-PRE: 42 %
FEF2575-PRE: 1.04 L/SEC
FEF2575-PRED: 2.46 L/SEC
FEFMAX-%PRED-PRE: 49 %
FEFMAX-PRE: 3.2 L/SEC
FEFMAX-PRED: 6.45 L/SEC
FEV1-%PRED-PRE: 44 %
FEV1-PRE: 1.14 L
FEV1FEV6-PRE: 83 %
FEV1FEV6-PRED: 81 %
FEV1FVC-PRE: 83 %
FEV1FVC-PRED: 79 %
FIFMAX-PRE: 3.11 L/SEC
FVC-%PRED-PRE: 42 %
FVC-PRE: 1.38 L
FVC-PRED: 3.25 L
GFR SERPL CREATININE-BSD FRML MDRD: 58 ML/MIN/1.7M2
GLUCOSE SERPL-MCNC: 110 MG/DL (ref 70–99)
HCT VFR BLD AUTO: 30 % (ref 35–47)
HGB BLD-MCNC: 9.2 G/DL (ref 11.7–15.7)
INR PPP: 1.84 (ref 0.86–1.14)
MAGNESIUM SERPL-MCNC: 1.6 MG/DL (ref 1.6–2.3)
MCH RBC QN AUTO: 31.5 PG (ref 26.5–33)
MCHC RBC AUTO-ENTMCNC: 30.7 G/DL (ref 31.5–36.5)
MCV RBC AUTO: 103 FL (ref 78–100)
PLATELET # BLD AUTO: 423 10E9/L (ref 150–450)
POTASSIUM SERPL-SCNC: 4.5 MMOL/L (ref 3.4–5.3)
RBC # BLD AUTO: 2.92 10E12/L (ref 3.8–5.2)
SODIUM SERPL-SCNC: 138 MMOL/L (ref 133–144)
TACROLIMUS BLD-MCNC: 16 UG/L (ref 5–15)
TME LAST DOSE: ABNORMAL H
WBC # BLD AUTO: 17.1 10E9/L (ref 4–11)

## 2018-04-09 PROCEDURE — G0463 HOSPITAL OUTPT CLINIC VISIT: HCPCS | Mod: ZF

## 2018-04-09 PROCEDURE — 80048 BASIC METABOLIC PNL TOTAL CA: CPT | Performed by: PHYSICIAN ASSISTANT

## 2018-04-09 PROCEDURE — 85610 PROTHROMBIN TIME: CPT | Performed by: PHYSICIAN ASSISTANT

## 2018-04-09 PROCEDURE — 85027 COMPLETE CBC AUTOMATED: CPT | Performed by: PHYSICIAN ASSISTANT

## 2018-04-09 PROCEDURE — 83735 ASSAY OF MAGNESIUM: CPT | Performed by: PHYSICIAN ASSISTANT

## 2018-04-09 PROCEDURE — 80197 ASSAY OF TACROLIMUS: CPT | Performed by: PHYSICIAN ASSISTANT

## 2018-04-09 RX ORDER — PHYTONADIONE 5 MG/1
10 TABLET ORAL ONCE
Qty: 2 TABLET | Refills: 0 | Status: SHIPPED | OUTPATIENT
Start: 2018-04-09 | End: 2018-04-09

## 2018-04-09 ASSESSMENT — PAIN SCALES - GENERAL: PAINLEVEL: MODERATE PAIN (5)

## 2018-04-09 NOTE — MR AVS SNAPSHOT
Kecia Blue   4/9/2018   Anticoagulation Therapy Visit    Description:  57 year old female   Provider:  Keri Nath [facility]   Department:  [facility]           INR as of 4/9/2018     Today's INR       Anticoagulation Summary as of 4/9/2018     INR goal 2.0-3.0   Today's INR    Full instructions 4/9: Hold; Otherwise No maintenance plan   Next INR check 4/13/2018    Indications   Lung transplant recipient (H) [Z94.2]  Deep vein thrombosis (DVT) (H) [I82.409] [I82.409]         April 2018 Details    Sun Mon Tue Wed Thu Fri Sat     1               2               3               4               5               6               7                 8               9      Hold   See details      10               11               12               13            14                 15               16               17               18               19               20               21                 22               23               24               25               26               27               28                 29               30                     Date Details   04/09 This INR check       Date of next INR:  4/13/2018         How to take your warfarin dose     Hold Do not take your warfarin dose. See the Details table to the right for additional instructions.

## 2018-04-09 NOTE — LETTER
4/9/2018       RE: Kecia Blue  [address] [place] [address]     Dear Colleague,    Thank you for referring your patient, Kecia Blue, to the [facility]GAN TRANSPLANT at [facility]. Please see a copy of my visit note below.            [facility]   Pulmonary Clinic Follow Up Note  April 9, 2018      Kecia Blue MRN# [id number]   Age: 57 year old YOB: 1960     Date of Consultation: April 9, 2018    Primary care provider: Rosy Vu     History taken from; Patient       Kecia Blue is a 57 year old female seen in the Pulmonary Clinic  for f/u.  Chief Complaint   Patient presents with     RECHECK     Post Lung TXP   .          Pulmonary Problem List / Reason for follow up:   1. Lung transplantation           Assessment and Plan:     ASSESSMENT AND PLAN:  The patient is a 57-year-old lady with a history of interstitial lung disease, status post bilateral lung transplant.  The patient is doing very well.   1.  Lung transplantation.  The patient is on triple immunosuppressive therapy.  The patient is tolerating her medications.   2.  Infectious disease.  The patient is on Valcyte, Bactrim and nystatine for prophylaxis.     3.  Upper extremity DVT.  The patient is currently on bridging with Lovenox for bronchoscopy.  The patient's INR is however elevated and we will give the patient vitamin K 10 mg today and will recheck INR tomorrow prior to the procedure.   4.  Hypertension.  The patient's metoprolol is 25 mg twice daily.  The patient's blood pressure is very well regulated.   5.  Lower extremity edema.  The patient has a larger swelling on the left side than on the right.  We will do the ultrasound to rule out DVT.      We explained the plan to the patient including the risks and benefits.  The patient expressed understanding and agreed with the plan.      Thank you very much  for the opportunity to participate in the care of this very pleasant patient.         Return visit in 1 week      Nicho Hood M.D.         History of Present Illness / Interval History:     HISTORY OF PRESENT ILLNESS:  The patient is 1 month status post bilateral lung transplant for interstitial lung disease.  The patient was on ECMO prior to the lung transplantation.  The patient is doing very well.  The patient's pulmonary function tests are stable and the patient denies any problems.  The patient has very mild epistaxis; however, the patient was on the Lovenox as a preparation for the bronchoscopy with biopsy and the patient's INR is also elevated.  Otherwise, the patient denies any shortness of breath, the patient's cough is getting better.  Overall, the patient is improving.                      Pulmonary Data:         Exposure history: Asbestos;  No , TB;  No , Radiation;   No          Medications:     Current Outpatient Prescriptions   Medication Sig     phytonadione (MEPHYTON) 5 MG tablet Take 2 tablets (10 mg) by mouth once for 1 dose     tacrolimus (GENERIC EQUIVALENT) 1 MG capsule Take 5 capsules (5 mg) by mouth 2 times daily Total dose 5.5 mg twice daily (Patient taking differently: Take 5 mg by mouth 2 times daily Total dose 5 mg twice daily)     valGANciclovir (VALCYTE) 450 MG tablet Take 2 tablets (900 mg) by mouth daily     mycophenolate (GENERIC EQUIVALENT) 250 MG capsule Take 6 capsules (1,500 mg) by mouth 2 times daily     nystatin (MYCOSTATIN) 892682 UNIT/ML suspension Take 10 mLs (1,000,000 Units) by mouth 4 times daily     enoxaparin (LOVENOX) 60 MG/0.6ML injection Inject the contents of 1 syringe subcutaneously every 12 hours as directed.     polyethylene glycol (MIRALAX/GLYCOLAX) Packet Take 17 g by mouth 2 times daily as needed for constipation     levalbuterol (XOPENEX HFA) 45 MCG/ACT Inhaler Inhale 2 puffs into the lungs every 6 hours as needed for shortness of breath / dyspnea or wheezing      senna-docusate (SENOKOT-S;PERICOLACE) 8.6-50 MG per tablet Take 2 tablets by mouth 2 times daily as needed for constipation     calcium-vitamin D (CALTRATE) 600-400 MG-UNIT per tablet Take 1 tablet by mouth 2 times daily (with meals)     magnesium oxide (MAG-OX) 400 MG tablet Take 1 tablet (400 mg) by mouth daily     metoprolol tartrate (LOPRESSOR) 25 MG tablet Take 1 tablet (25 mg) by mouth 2 times daily     pantoprazole (PROTONIX) 40 MG EC tablet Take 1 tablet (40 mg) by mouth every morning     sulfamethoxazole-trimethoprim (BACTRIM/SEPTRA) 400-80 MG per tablet 1 tablet by Oral or NG Tube route daily     multivitamin, therapeutic with minerals (THERA-VIT-M) TABS tablet Take 1 tablet by mouth daily     predniSONE (DELTASONE) 5 MG tablet Date AM (mg) PM (mg)  3/22/18 12.5 10  4/26/18 10 10  5/24/18 10 7.5  6/22/18 7.5 7.5  7/20/18 7.5 5  8/24/18 5 5  9/21/18 5 2.5     order for DME Equipment being ordered: Oxygen 5 liters at rest, 15 liters with exertion.  Needs portability.  Please provide 2 10-liter concentrators for in the home     tacrolimus (GENERIC EQUIVALENT) 0.5 MG capsule Take 1 capsule (0.5 mg) by mouth 2 times daily Total dose 5.5 mg twice daily     [DISCONTINUED] valGANciclovir (VALCYTE) 450 MG tablet Take 2 tablets (900 mg) by mouth daily     warfarin (COUMADIN) 1 MG tablet 2-4mg daily, adjusted by coumadin clinic     No current facility-administered medications for this visit.              Past Medical History:     Past Medical History:   Diagnosis Date     Antisynthetase syndrome (H) 2014     Chronic cough      Dermatomyositis (H)      Dysplasia of cervix, low grade (ESTRADA 1)      ILD (interstitial lung disease) (H)      Osteopenia      Pulmonary hypertension      Raynaud's disease      Seronegative rheumatoid arthritis (H)              Past Surgical History:     Past Surgical History:   Procedure Laterality Date     BRONCHOSCOPY FLEXIBLE N/A 3/13/2018    Procedure: BRONCHOSCOPY FLEXIBLE;  Flexible  Bronchoscopy ;  Surgeon: Gissell Sanchez MD;  Location: [facility]     ENT SURGERY      tonsillectomy as a child     INSERT EXTRACORPORAL MEMBRANE OXYGENATOR ADULT (OUTSIDE OR) N/A 2/27/2018    Procedure: INSERT EXTRACORPORAL MEMBRANE OXYGENATOR ADULT (OUTSIDE OR);  INSERT EXTRACORPORAL MEMBRANE OXYGENATOR ADULT (OUTSIDE OR) ;  Surgeon: Toby Hernandez MD;  Location:  OR     no prior surgery       REMOVE EXTRACORPORAL MEMBRANE OXYGENATOR ADULT N/A 3/3/2018    Procedure: REMOVE EXTRACORPORAL MEMBRANE OXYGENATOR ADULT;  Removal of Right Femoral Venous and Right Axillary Arterial Extracorporeal Membrane Oxygenator;  Surgeon: Toby Hernandez MD;  Location:  OR     TRANSPLANT LUNG RECIPIENT SINGLE X2 Bilateral 3/1/2018    Procedure: TRANSPLANT LUNG RECIPIENT SINGLE X2;  Median Sternotomy, Extracorporeal Membrane Oxygenator, bilateral sequential lung transplant;  Surgeon: Toby Hernandez MD;  Location:  OR             Social History:     Social History     Social History     Marital status:      Spouse name: N/A     Number of children: N/A     Years of education: N/A     Occupational History     Not on file.     Social History Main Topics     Smoking status: Never Smoker     Smokeless tobacco: Never Used     Alcohol use No     Drug use: No     Sexual activity: Not on file     Other Topics Concern     Parent/Sibling W/ Cabg, Mi Or Angioplasty Before 65f 55m? No     Social History Narrative             Family History:     Family History   Problem Relation Age of Onset     Hypertension Mother      Arthritis Mother      Pancreatic Cancer Father      DIABETES Father              Immunization:     There is no immunization history on file for this patient.           Review of Systems:   I have done 10 points of review systems and pertinent findings are as above, otherwise negative.             Physical Examination:   General: Alert, oriented, not in distress  B/P: 128/83, T: 97.9, P:  102, R: Data Unavailable  HEENT: neck supple, symmetrical, no lymph node enlargement, thyromegaly, bruit, JVD, pupils are isocoric and equally responsive to the light.   Lungs: both hemithoraces are symmetrical, normal to palpation, no dullness to percussion, auscultation of lungs revealed normal breathing sounds  CVS: Normal S1 and S2, no additional heart sounds, murmur, rub, normal peripheral pulses  Abdomen: Bowel sounds present, soft, non tender, non distended, no organomegaly, ascitis, mass  Extremities/musculoskeletal: no peripheral edema, deformity, cyanosis, clubbing  Neurology: alert, orientedx3, no motor/sensorial deficits, cranial nerves grossly normal  Skin: no rash  Psychiatry: Mood and affect are appropriate             DATA:     CBC RESULTS:     Recent Labs   Lab Test  04/09/18   0755  04/05/18   0756   WBC  17.1*  15.9*   RBC  2.92*  2.92*   HGB  9.2*  9.3*   HCT  30.0*  30.1*   PLT  423  524*       Basic Metabolic Panel:  Recent Labs   Lab Test  04/09/18   0755  04/05/18   0756   NA  138  137   POTASSIUM  4.5  4.7   CHLORIDE  104  103   CO2  27  26   BUN  20  23   CR  1.00  0.98   GLC  110*  110*   CHELSEY  9.0  8.5       INR  Recent Labs   Lab Test  04/09/18   0755  04/05/18   0756   INR  1.84*  3.75*        PFT   PFT Latest Ref Rng & Units 4/9/2018   FVC L 1.38   FEV1 L 1.14   FVC% % 42   FEV1% % 44       PFT: Spirometry revealed restrictive defect please consider lung volume measurement if clinically indicated.      Thank you for allowing me participate in the care of Kecia Blue.    Nicho Hood M.D.  Associate Professor of Medicine  Pulmonary, Allergy, Critical Care and Sleep        Again, thank you for allowing me to participate in the care of your patient.      Sincerely,    Nicho Hood MD

## 2018-04-09 NOTE — LETTER
4/9/2018      RE: Kecia Blue  [address] [place] [address]               [facility]   Pulmonary Clinic Follow Up Note  April 9, 2018      Kecia Blue MRN# [id number]   Age: 57 year old YOB: 1960     Date of Consultation: April 9, 2018    Primary care provider: Rosy Vu     History taken from; Patient       Kecia Blue is a 57 year old female seen in the Pulmonary Clinic  for f/u.  Chief Complaint   Patient presents with     RECHECK     Post Lung TXP   .          Pulmonary Problem List / Reason for follow up:   1. Lung transplantation           Assessment and Plan:     ASSESSMENT AND PLAN:  The patient is a 57-year-old lady with a history of interstitial lung disease, status post bilateral lung transplant.  The patient is doing very well.   1.  Lung transplantation.  The patient is on triple immunosuppressive therapy.  The patient is tolerating her medications.   2.  Infectious disease.  The patient is on Valcyte, Bactrim and nystatine for prophylaxis.     3.  Upper extremity DVT.  The patient is currently on bridging with Lovenox for bronchoscopy.  The patient's INR is however elevated and we will give the patient vitamin K 10 mg today and will recheck INR tomorrow prior to the procedure.   4.  Hypertension.  The patient's metoprolol is 25 mg twice daily.  The patient's blood pressure is very well regulated.   5.  Lower extremity edema.  The patient has a larger swelling on the left side than on the right.  We will do the ultrasound to rule out DVT.      We explained the plan to the patient including the risks and benefits.  The patient expressed understanding and agreed with the plan.      Thank you very much for the opportunity to participate in the care of this very pleasant patient.         Return visit in 1 week      Nicho Hood M.D.         History of Present Illness / Interval History:     HISTORY OF PRESENT  ILLNESS:  The patient is 1 month status post bilateral lung transplant for interstitial lung disease.  The patient was on ECMO prior to the lung transplantation.  The patient is doing very well.  The patient's pulmonary function tests are stable and the patient denies any problems.  The patient has very mild epistaxis; however, the patient was on the Lovenox as a preparation for the bronchoscopy with biopsy and the patient's INR is also elevated.  Otherwise, the patient denies any shortness of breath, the patient's cough is getting better.  Overall, the patient is improving.                      Pulmonary Data:         Exposure history: Asbestos;  No , TB;  No , Radiation;   No          Medications:     Current Outpatient Prescriptions   Medication Sig     phytonadione (MEPHYTON) 5 MG tablet Take 2 tablets (10 mg) by mouth once for 1 dose     tacrolimus (GENERIC EQUIVALENT) 1 MG capsule Take 5 capsules (5 mg) by mouth 2 times daily Total dose 5.5 mg twice daily (Patient taking differently: Take 5 mg by mouth 2 times daily Total dose 5 mg twice daily)     valGANciclovir (VALCYTE) 450 MG tablet Take 2 tablets (900 mg) by mouth daily     mycophenolate (GENERIC EQUIVALENT) 250 MG capsule Take 6 capsules (1,500 mg) by mouth 2 times daily     nystatin (MYCOSTATIN) 244585 UNIT/ML suspension Take 10 mLs (1,000,000 Units) by mouth 4 times daily     enoxaparin (LOVENOX) 60 MG/0.6ML injection Inject the contents of 1 syringe subcutaneously every 12 hours as directed.     polyethylene glycol (MIRALAX/GLYCOLAX) Packet Take 17 g by mouth 2 times daily as needed for constipation     levalbuterol (XOPENEX HFA) 45 MCG/ACT Inhaler Inhale 2 puffs into the lungs every 6 hours as needed for shortness of breath / dyspnea or wheezing     senna-docusate (SENOKOT-S;PERICOLACE) 8.6-50 MG per tablet Take 2 tablets by mouth 2 times daily as needed for constipation     calcium-vitamin D (CALTRATE) 600-400 MG-UNIT per tablet Take 1 tablet by  mouth 2 times daily (with meals)     magnesium oxide (MAG-OX) 400 MG tablet Take 1 tablet (400 mg) by mouth daily     metoprolol tartrate (LOPRESSOR) 25 MG tablet Take 1 tablet (25 mg) by mouth 2 times daily     pantoprazole (PROTONIX) 40 MG EC tablet Take 1 tablet (40 mg) by mouth every morning     sulfamethoxazole-trimethoprim (BACTRIM/SEPTRA) 400-80 MG per tablet 1 tablet by Oral or NG Tube route daily     multivitamin, therapeutic with minerals (THERA-VIT-M) TABS tablet Take 1 tablet by mouth daily     predniSONE (DELTASONE) 5 MG tablet Date AM (mg) PM (mg)  3/22/18 12.5 10  4/26/18 10 10  5/24/18 10 7.5  6/22/18 7.5 7.5  7/20/18 7.5 5  8/24/18 5 5  9/21/18 5 2.5     order for DME Equipment being ordered: Oxygen 5 liters at rest, 15 liters with exertion.  Needs portability.  Please provide 2 10-liter concentrators for in the home     tacrolimus (GENERIC EQUIVALENT) 0.5 MG capsule Take 1 capsule (0.5 mg) by mouth 2 times daily Total dose 5.5 mg twice daily     [DISCONTINUED] valGANciclovir (VALCYTE) 450 MG tablet Take 2 tablets (900 mg) by mouth daily     warfarin (COUMADIN) 1 MG tablet 2-4mg daily, adjusted by coumadin clinic     No current facility-administered medications for this visit.              Past Medical History:     Past Medical History:   Diagnosis Date     Antisynthetase syndrome (H) 2014     Chronic cough      Dermatomyositis (H)      Dysplasia of cervix, low grade (ESTRADA 1)      ILD (interstitial lung disease) (H)      Osteopenia      Pulmonary hypertension      Raynaud's disease      Seronegative rheumatoid arthritis (H)              Past Surgical History:     Past Surgical History:   Procedure Laterality Date     BRONCHOSCOPY FLEXIBLE N/A 3/13/2018    Procedure: BRONCHOSCOPY FLEXIBLE;  Flexible Bronchoscopy ;  Surgeon: Gissell Sanchez MD;  Location:  GI     ENT SURGERY      tonsillectomy as a child     INSERT EXTRACORPORAL MEMBRANE OXYGENATOR ADULT (OUTSIDE OR) N/A 2/27/2018    Procedure:  INSERT EXTRACORPORAL MEMBRANE OXYGENATOR ADULT (OUTSIDE OR);  INSERT EXTRACORPORAL MEMBRANE OXYGENATOR ADULT (OUTSIDE OR) ;  Surgeon: Toby Hernandez MD;  Location: [facility] OR     no prior surgery       REMOVE EXTRACORPORAL MEMBRANE OXYGENATOR ADULT N/A 3/3/2018    Procedure: REMOVE EXTRACORPORAL MEMBRANE OXYGENATOR ADULT;  Removal of Right Femoral Venous and Right Axillary Arterial Extracorporeal Membrane Oxygenator;  Surgeon: Toby Hernandez MD;  Location: [facility] OR     TRANSPLANT LUNG RECIPIENT SINGLE X2 Bilateral 3/1/2018    Procedure: TRANSPLANT LUNG RECIPIENT SINGLE X2;  Median Sternotomy, Extracorporeal Membrane Oxygenator, bilateral sequential lung transplant;  Surgeon: Toby Hernandez MD;  Location: [facility] OR             Social History:     Social History     Social History     Marital status:      Spouse name: N/A     Number of children: N/A     Years of education: N/A     Occupational History     Not on file.     Social History Main Topics     Smoking status: Never Smoker     Smokeless tobacco: Never Used     Alcohol use No     Drug use: No     Sexual activity: Not on file     Other Topics Concern     Parent/Sibling W/ Cabg, Mi Or Angioplasty Before 65f 55m? No     Social History Narrative             Family History:     Family History   Problem Relation Age of Onset     Hypertension Mother      Arthritis Mother      Pancreatic Cancer Father      DIABETES Father              Immunization:     There is no immunization history on file for this patient.           Review of Systems:   I have done 10 points of review systems and pertinent findings are as above, otherwise negative.             Physical Examination:   General: Alert, oriented, not in distress  B/P: 128/83, T: 97.9, P: 102, R: Data Unavailable  HEENT: neck supple, symmetrical, no lymph node enlargement, thyromegaly, bruit, JVD, pupils are isocoric and equally responsive to the light.   Lungs: both hemithoraces are  symmetrical, normal to palpation, no dullness to percussion, auscultation of lungs revealed normal breathing sounds  CVS: Normal S1 and S2, no additional heart sounds, murmur, rub, normal peripheral pulses  Abdomen: Bowel sounds present, soft, non tender, non distended, no organomegaly, ascitis, mass  Extremities/musculoskeletal: no peripheral edema, deformity, cyanosis, clubbing  Neurology: alert, orientedx3, no motor/sensorial deficits, cranial nerves grossly normal  Skin: no rash  Psychiatry: Mood and affect are appropriate             DATA:     CBC RESULTS:     Recent Labs   Lab Test  04/09/18   0755  04/05/18   0756   WBC  17.1*  15.9*   RBC  2.92*  2.92*   HGB  9.2*  9.3*   HCT  30.0*  30.1*   PLT  423  524*       Basic Metabolic Panel:  Recent Labs   Lab Test  04/09/18   0755  04/05/18   0756   NA  138  137   POTASSIUM  4.5  4.7   CHLORIDE  104  103   CO2  27  26   BUN  20  23   CR  1.00  0.98   GLC  110*  110*   CHELSEY  9.0  8.5       INR  Recent Labs   Lab Test  04/09/18   0755  04/05/18   0756   INR  1.84*  3.75*        PFT   PFT Latest Ref Rng & Units 4/9/2018   FVC L 1.38   FEV1 L 1.14   FVC% % 42   FEV1% % 44       PFT: Spirometry revealed restrictive defect please consider lung volume measurement if clinically indicated.      Thank you for allowing me participate in the care of Kecia Blue.    Nicho Hood M.D.  Associate Professor of Medicine  Pulmonary, Allergy, Critical Care and Sleep

## 2018-04-09 NOTE — PATIENT INSTRUCTIONS
Patient Instructions  1. Tacrolimus level was 16.0, decrease tacrolimus to 4 mg in the PM. Total dose 5 mg in AM and 4mg in PM  2. Take 10 mg of Vitamin K tonight.   3. INR tomorrow morning   4. Ultrasound of your leg today to make sure there's no clot     You are scheduled for a bronchoscopy on 10 AM at 4/9 at the [facility] on 1st floor. Arrive 1 hour early.     Instructions     1. Nothing to eat or drink 8 hours before procedure.  2. Hold your morning medications. Bring them with you to take after the procedure.  3. Have a  available to take you home.     Next transplant clinic appointment: 1 week with CXR, labs and PFTs  Next lab draw: 1 week but coumadin clinic will want INR sooner         ~~~~~~~~~~~~~~~~~~~~~~~~~    Thoracic Transplant Office phone [phone], fax [phone]    Office Hours 8:30 - 5:00     For after-hours urgent issues, please dial [phone], and ask to speak with the Thoracic Transplant Coordinator  On-Call, pager [phone].  --------------------  To expedite your medication refill(s), please contact your pharmacy and have them fax a refill request to: [phone]  .   *Please allow 3 business days for routine medication refills.  *Please allow 5 business days for controlled substance medication refills.    **For Diabetic medications and supplies refill(s), please contact your pharmacy and have them  Contact your Endocrine team.  --------------------  For scheduling appointments call [facility] :  [phone]. For lab appointments call [phone] or [facility].  --------------------  Please Note: If you are active on [email or web address], all future test results will be sent by [email or web address] message only, and will no longer be called to patient. You may also receive communication directly from your physician.

## 2018-04-09 NOTE — NURSING NOTE
Transplant Coordinator Note      Reason for visit: Post lung transplant follow up visit   Coordinator: Present   Caregiver:  present      Health concerns addressed today:  1. No improvement with PFT  2. Chest pain improved with schedule tylenol   3.. Bronch tomorrow. INR 1.8, vitamin K tonight  4. LLE edema>R side--ultrasound of L leg today        Activity/rehab: pulmonary rehab starts on 4/6  Oxygen needs: RA  Pain management/RX: na  Diabetic management: na  Next Bronch due: 4/10, will need lovenox bridging, coumadin clinic aware   High risk donor:  Yes   CMV status: R+/D+  Valcyte stopped: at 3 month zachary, June 1st   DVT/PE: yes  AC/asa: coumadin  PJP prophylactic: bactrim  Other: she has raynauds's --fingers purple       Pt Education: medications (use/dose/side effects), how/when to call coordinator, frequency of labs, s/s of infection/rejection, call prior to starting any new medications, lab/vital sign book, prednisone taper  Health Maintenance:     Last colonoscopy:     Next colonoscopy due:     Dermatology: annually     Vaccinations this visit: na      Labs, CXR, PFTs reviewed with patient  Medication record reviewed and reconciled  Questions and concerns addressed    Patient Instructions  1. Tacrolimus level was 16.0, decrease tacrolimus to 4 mg in the PM. Total dose 5 mg in AM and 4mg in PM  2. Take 10 mg of Vitamin K tonight.   3. INR tomorrow morning at 7  4. Ultrasound of your leg today to make sure there's no clot     You are scheduled for a bronchoscopy on 10 AM at 4/9 at the [facility] on 1st floor. Arrive 1 hour early.     Instructions     1. Nothing to eat or drink 8 hours before procedure.  2. Hold your morning medications. Bring them with you to take after the procedure.  3. Have a  available to take you home.     Next transplant clinic appointment: 1 week with CXR, labs and PFTs  Next lab draw: Friday

## 2018-04-09 NOTE — PROGRESS NOTES
ANTICOAGULATION FOLLOW-UP CLINIC VISIT    Patient Name:  Kecia Blue  Date:  4/9/2018  Contact Type:  Telephone    SUBJECTIVE:     Patient Findings     Comments Bronch scheduled for 4/10. Has been following plan provided by Evelyn in transplant. Kecia will hold warfarin and Lovenox tonight and will restart per orders from the physician who competes the bronch.            OBJECTIVE    INR   Date Value Ref Range Status   04/09/2018 1.84 (H) 0.86 - 1.14 Final       ASSESSMENT / PLAN  No question data found.  Anticoagulation Summary as of 4/9/2018     INR goal 2.0-3.0   Today's INR    Maintenance plan No maintenance plan   Full instructions 4/9: Hold; Otherwise No maintenance plan   Next INR check 4/13/2018   Priority INR   Target end date     Indications   Lung transplant recipient (H) [Z94.2]  Deep vein thrombosis (DVT) (H) [I82.409] [I82.409]         Anticoagulation Episode Summary     INR check location Clinic Lab    Preferred lab     Send INR reminders to [facility]    Comments Plan for 3 months of therapy- Provoked DVT. Welcome package sent . Patient staying at [facility] Verbal OK to speak with  and leave messages on Cell [phone]      Anticoagulation Care Providers     Provider Role Specialty Phone number    Ame Chow PA-C Responsible Pulmonary [phone]            See the Encounter Report to view Anticoagulation Flowsheet and Dosing Calendar (Go to Encounters tab in chart review, and find the Anticoagulation Therapy Visit)    Spoke with patient. Gave them their lab results and new warfarin recommendation.  No changes in health, medication, or diet. No missed doses, no falls. No signs or symptoms of bleed or clotting. Kecia will let us know what the plan is as far as restarting her warfarin after the bronch.    Keri Nath, [facility]

## 2018-04-10 ENCOUNTER — APPOINTMENT (OUTPATIENT)
Dept: GENERAL RADIOLOGY | Facility: CLINIC | Age: 58
End: 2018-04-10
Attending: INTERNAL MEDICINE
Payer: COMMERCIAL

## 2018-04-10 ENCOUNTER — TELEPHONE (OUTPATIENT)
Dept: TRANSPLANT | Facility: CLINIC | Age: 58
End: 2018-04-10

## 2018-04-10 ENCOUNTER — HOSPITAL ENCOUNTER (OUTPATIENT)
Facility: CLINIC | Age: 58
Discharge: HOME | End: 2018-04-10
Attending: INTERNAL MEDICINE | Admitting: INTERNAL MEDICINE
Payer: COMMERCIAL

## 2018-04-10 ENCOUNTER — ANTICOAGULATION THERAPY VISIT (OUTPATIENT)
Dept: ANTICOAGULATION | Facility: CLINIC | Age: 58
End: 2018-04-10

## 2018-04-10 ENCOUNTER — SURGERY (OUTPATIENT)
Age: 58
End: 2018-04-10

## 2018-04-10 VITALS
OXYGEN SATURATION: 98 % | SYSTOLIC BLOOD PRESSURE: 124 MMHG | DIASTOLIC BLOOD PRESSURE: 86 MMHG | RESPIRATION RATE: 31 BRPM | HEART RATE: 104 BPM

## 2018-04-10 DIAGNOSIS — Z94.2 LUNG REPLACED BY TRANSPLANT (H): ICD-10-CM

## 2018-04-10 DIAGNOSIS — Z94.2 LUNG TRANSPLANT RECIPIENT (H): ICD-10-CM

## 2018-04-10 DIAGNOSIS — I82.622 ACUTE DEEP VEIN THROMBOSIS (DVT) OF LEFT UPPER EXTREMITY, UNSPECIFIED VEIN (H): ICD-10-CM

## 2018-04-10 LAB
APPEARANCE FLD: NORMAL
BRONCHOSCOPY: NORMAL
CMV DNA SPEC NAA+PROBE-ACNC: NORMAL [IU]/ML
CMV DNA SPEC NAA+PROBE-LOG#: NORMAL {LOG_IU}/ML
COLOR FLD: COLORLESS
COPATH REPORT: NORMAL
COPATH REPORT: NORMAL
DONOR IDENTIFICATION: NORMAL
DQB7: 1897
DSA COMMENTS: NORMAL
DSA PRESENT: YES
DSA TEST METHOD: NORMAL
GRAM STN SPEC: NORMAL
GRAM STN SPEC: NORMAL
INR PPP: 1.1 (ref 0.86–1.14)
LYMPHOCYTES NFR FLD MANUAL: 8 %
MONOS+MACROS NFR FLD MANUAL: 88 %
NEUTS BAND NFR FLD MANUAL: 4 %
ORGAN: NORMAL
SA1 CELL: NORMAL
SA1 COMMENTS: NORMAL
SA1 HI RISK ABY: NORMAL
SA1 MOD RISK ABY: NORMAL
SA1 TEST METHOD: NORMAL
SA2 CELL: NORMAL
SA2 COMMENTS: NORMAL
SA2 HI RISK ABY UA: NORMAL
SA2 MOD RISK ABY: NORMAL
SA2 TEST METHOD: NORMAL
SPECIMEN SOURCE FLD: NORMAL
SPECIMEN SOURCE: NORMAL
SPECIMEN SOURCE: NORMAL
WBC # FLD AUTO: 313 /UL

## 2018-04-10 PROCEDURE — 40000275 ZZH STATISTIC RCP TIME EA 10 MIN

## 2018-04-10 PROCEDURE — 87102 FUNGUS ISOLATION CULTURE: CPT | Performed by: INTERNAL MEDICINE

## 2018-04-10 PROCEDURE — 25000128 H RX IP 250 OP 636: Performed by: INTERNAL MEDICINE

## 2018-04-10 PROCEDURE — 99152 MOD SED SAME PHYS/QHP 5/>YRS: CPT | Performed by: INTERNAL MEDICINE

## 2018-04-10 PROCEDURE — 71046 X-RAY EXAM CHEST 2 VIEWS: CPT

## 2018-04-10 PROCEDURE — 31624 DX BRONCHOSCOPE/LAVAGE: CPT | Performed by: INTERNAL MEDICINE

## 2018-04-10 PROCEDURE — 88312 SPECIAL STAINS GROUP 1: CPT | Performed by: INTERNAL MEDICINE

## 2018-04-10 PROCEDURE — 89051 BODY FLUID CELL COUNT: CPT | Performed by: INTERNAL MEDICINE

## 2018-04-10 PROCEDURE — 31625 BRONCHOSCOPY W/BIOPSY(S): CPT | Performed by: INTERNAL MEDICINE

## 2018-04-10 PROCEDURE — 87070 CULTURE OTHR SPECIMN AEROBIC: CPT | Performed by: INTERNAL MEDICINE

## 2018-04-10 PROCEDURE — 87206 SMEAR FLUORESCENT/ACID STAI: CPT | Performed by: INTERNAL MEDICINE

## 2018-04-10 PROCEDURE — 31632 BRONCHOSCOPY/LUNG BX ADDL: CPT

## 2018-04-10 PROCEDURE — 87015 SPECIMEN INFECT AGNT CONCNTJ: CPT | Performed by: INTERNAL MEDICINE

## 2018-04-10 PROCEDURE — 87633 RESP VIRUS 12-25 TARGETS: CPT | Performed by: INTERNAL MEDICINE

## 2018-04-10 PROCEDURE — 27210995 ZZH RX 272: Performed by: INTERNAL MEDICINE

## 2018-04-10 PROCEDURE — 87116 MYCOBACTERIA CULTURE: CPT | Performed by: INTERNAL MEDICINE

## 2018-04-10 PROCEDURE — 99153 MOD SED SAME PHYS/QHP EA: CPT | Performed by: INTERNAL MEDICINE

## 2018-04-10 PROCEDURE — 31628 BRONCHOSCOPY/LUNG BX EACH: CPT | Performed by: INTERNAL MEDICINE

## 2018-04-10 PROCEDURE — 88305 TISSUE EXAM BY PATHOLOGIST: CPT | Performed by: INTERNAL MEDICINE

## 2018-04-10 PROCEDURE — 88108 CYTOPATH CONCENTRATE TECH: CPT | Performed by: INTERNAL MEDICINE

## 2018-04-10 PROCEDURE — 87102 FUNGUS ISOLATION CULTURE: CPT | Mod: 91 | Performed by: INTERNAL MEDICINE

## 2018-04-10 PROCEDURE — 87205 SMEAR GRAM STAIN: CPT | Performed by: INTERNAL MEDICINE

## 2018-04-10 PROCEDURE — 25000125 ZZHC RX 250: Performed by: INTERNAL MEDICINE

## 2018-04-10 RX ORDER — MAGNESIUM HYDROXIDE 1200 MG/15ML
LIQUID ORAL PRN
Status: DISCONTINUED | OUTPATIENT
Start: 2018-04-10 | End: 2018-04-10 | Stop reason: HOSPADM

## 2018-04-10 RX ORDER — ALBUTEROL SULFATE 0.83 MG/ML
SOLUTION RESPIRATORY (INHALATION) PRN
Status: DISCONTINUED | OUTPATIENT
Start: 2018-04-10 | End: 2018-04-10 | Stop reason: HOSPADM

## 2018-04-10 RX ORDER — FENTANYL CITRATE 50 UG/ML
INJECTION, SOLUTION INTRAMUSCULAR; INTRAVENOUS PRN
Status: DISCONTINUED | OUTPATIENT
Start: 2018-04-10 | End: 2018-04-10 | Stop reason: HOSPADM

## 2018-04-10 RX ORDER — LIDOCAINE HYDROCHLORIDE AND EPINEPHRINE 10; 10 MG/ML; UG/ML
INJECTION, SOLUTION INFILTRATION; PERINEURAL PRN
Status: DISCONTINUED | OUTPATIENT
Start: 2018-04-10 | End: 2018-04-10 | Stop reason: HOSPADM

## 2018-04-10 RX ORDER — LIDOCAINE HYDROCHLORIDE 40 MG/ML
INJECTION, SOLUTION RETROBULBAR PRN
Status: DISCONTINUED | OUTPATIENT
Start: 2018-04-10 | End: 2018-04-10 | Stop reason: HOSPADM

## 2018-04-10 RX ADMIN — MIDAZOLAM 1 MG: 1 INJECTION INTRAMUSCULAR; INTRAVENOUS at 10:19

## 2018-04-10 RX ADMIN — FENTANYL CITRATE 50 MCG: 50 INJECTION, SOLUTION INTRAMUSCULAR; INTRAVENOUS at 10:29

## 2018-04-10 RX ADMIN — LIDOCAINE HYDROCHLORIDE,EPINEPHRINE BITARTRATE 10 ML: 10; .01 INJECTION, SOLUTION INFILTRATION; PERINEURAL at 10:27

## 2018-04-10 RX ADMIN — LIDOCAINE HYDROCHLORIDE 9 ML: 10 INJECTION, SOLUTION EPIDURAL; INFILTRATION; INTRACAUDAL; PERINEURAL at 10:16

## 2018-04-10 RX ADMIN — FENTANYL CITRATE 25 MCG: 50 INJECTION, SOLUTION INTRAMUSCULAR; INTRAVENOUS at 10:14

## 2018-04-10 RX ADMIN — MIDAZOLAM 0.5 MG: 1 INJECTION INTRAMUSCULAR; INTRAVENOUS at 10:13

## 2018-04-10 RX ADMIN — FENTANYL CITRATE 25 MCG: 50 INJECTION, SOLUTION INTRAMUSCULAR; INTRAVENOUS at 10:43

## 2018-04-10 RX ADMIN — LIDOCAINE HYDROCHLORIDE 3 ML: 40 INJECTION, SOLUTION RETROBULBAR; TOPICAL at 09:44

## 2018-04-10 RX ADMIN — BENZOCAINE 1 SPRAY: 220 SPRAY, METERED PERIODONTAL at 10:12

## 2018-04-10 RX ADMIN — FENTANYL CITRATE 25 MCG: 50 INJECTION, SOLUTION INTRAMUSCULAR; INTRAVENOUS at 10:12

## 2018-04-10 RX ADMIN — LIDOCAINE HYDROCHLORIDE 9 ML: 40 INJECTION, SOLUTION RETROBULBAR; TOPICAL at 10:15

## 2018-04-10 RX ADMIN — MIDAZOLAM 0.5 MG: 1 INJECTION INTRAMUSCULAR; INTRAVENOUS at 10:14

## 2018-04-10 RX ADMIN — LIDOCAINE HYDROCHLORIDE 8 ML: 40 INJECTION, SOLUTION RETROBULBAR; TOPICAL at 10:11

## 2018-04-10 RX ADMIN — SODIUM CHLORIDE 120 ML: 900 IRRIGANT IRRIGATION at 10:24

## 2018-04-10 RX ADMIN — ALBUTEROL SULFATE 2.5 MG: 2.5 SOLUTION RESPIRATORY (INHALATION) at 09:44

## 2018-04-10 NOTE — IP AVS SNAPSHOT
MRN:[id number]                      After Visit Summary   4/10/2018    Kecia Blue    MRN: [id number]           Thank you!     Thank you for choosing [facility] for your care. Our goal is always to provide you with excellent care. Hearing back from our patients is one way we can continue to improve our services. Please take a few minutes to complete the written survey that you may receive in the mail after you visit with us. Thank you!        Patient Information     Date Of Birth          1960        About your hospital stay     You were admitted on:  April 10, 2018 You last received care in the:  [facility], Endoscopy    You were discharged on:  April 10, 2018       Who to Call     For medical emergencies, please call 911.  For non-urgent questions about your medical care, please call your primary care provider or clinic, [phone]  For questions related to your surgery, please call your surgery clinic        Attending Provider     Provider Specialty    Mariposa Donohue MD Pulmonary       Primary Care Provider Office Phone # Fax #    Rosy Vu [phone]      Your next 10 appointments already scheduled     Apr 11, 2018 10:00 AM CDT   Pulmonary Treatment with UR PULMONARY REHAB 2   [facility], Cardiac Rehabilitation ([facility])    [address] [place] [address] 1st Floor [address] [address]   [phone]            Apr 13, 2018 10:00 AM CDT   Pulmonary Treatment with UR PULMONARY REHAB 2   [facility], Cardiac Rehabilitation ([facility])    [address] [place] [address] [address] [address]   [phone]            Apr 16, 2018  7:00 AM CDT   PFT VISIT with  PFL A   [facility] Pulmonary Function Testing ([facility])    [address]  3rd Floor  [facility] [address]    [phone]            Apr 16, 2018  7:30 AM CDT   Lab with UC LAB   [facility] ([facility])    [address]  1st Floor  [facility] [address] [phone]            Apr 16, 2018  7:45 AM CDT   (Arrive by 7:30 AM)   XR CHEST 2 VIEWS with UCXR1   [facility] Xray ([facility])    [address]  1st Floor  [facility] [address] [phone]           Please bring a list of your current medicines to your exam. (Include vitamins, minerals and over-thecounter medicines.) Leave your valuables at home.  Tell your doctor if there is a chance you may be pregnant.  You do not need to do anything special for this exam.            Apr 16, 2018  8:00 AM CDT   (Arrive by 7:45 AM)   Return Lung Transplant with Alex Hale MD   [facility] Solid Organ Transplant ([facility])    [address]  3rd [facility] [address]   [phone]            Apr 18, 2018 10:00 AM CDT   Pulmonary Treatment with UR PULMONARY REHAB 2   [facility], Cardiac Rehabilitation ([facility])    [address]st [address] [address]   [phone]            Apr 20, 2018 10:00 AM CDT   Pulmonary Treatment with UR PULMONARY REHAB 2   [facility], Cardiac Rehabilitation ([facility])    [address] [address]   [phone]            Apr 25, 2018 10:00 AM CDT   Pulmonary Treatment with UR PULMONARY REHAB 2   [facility], Cardiac Rehabilitation ([facility])    [address] [address]   [phone]            Apr 27, 2018 10:00 AM CDT   Pulmonary Treatment with UR PULMONARY REHAB 2   [facility], Cardiac Rehabilitation  ([facility])    [address] 1st Floor [address]  [facility] [id number] [phone]              Further instructions from your care team       Discharge Instructions after Bronchoscopy with biopsies and lavage by Dr Donohue    Activity  _X__ You had medicine to relax and for pain. You may feel dizzy or sleepy.  For 24 hours:    Do not drive or use heavy equipment.    Do not make important decisions.    Do not drink any alcohol.    Diet  _X__ After CXR and if OK, then when you can swallow easily, you may go back to your regular diet, medicines  and light exercise.    Follow-up  _X__ We took small tissue and fluid samples to study. Physician will send you the results in about 10 business days or as they are complete.    Call right away if you have:    Unusual chest pain    Temperature above 100.6  F (37.5  C)    Coughing that does not stop.    If you have severe pain, bleeding, or shortness of breath, go to an emergency room.    If you have questions, call:  Monday to Friday, 7 a.m. to 4:30 p.m.  Endoscopy: [phone] (We may have to call you back)    After hours:  Hospital: [phone] (Ask for the pulmonary fellow on call)    Pending Results     No orders found from 4/8/2018 to 4/11/2018.            Admission Information     Date & Time Provider Department Dept. Phone    4/10/2018 Mariposa Donohue MD [facility] [place], Endoscopy [phone]      Your Vitals Were     Blood Pressure Pulse Respirations Last Period Pulse Oximetry       124/86 104 31 06/07/2014 (Exact Date) 98%       MyChart Information     [employer] gives you secure access to your electronic health record. If you see a primary care provider, you can also send messages to your care team and make appointments. If you have questions, please call your primary care clinic.  If you do not have a primary care provider, please call [phone] and they will assist you.        Care  EveryWhere ID     This is your Care EveryWhere ID. This could be used by other organizations to access your [facility] medical records  [id number]        Equal Access to Services     ALBAN VALENCIA : Sahara Lucas, yasmin sierra, jvigor martinesrosmalini houghdaniella, waxsil yoliin hayaaeloina houghkelli bacon lalioneloina kelley. So [facility] [phone].    ATENCIÓN: Si habla español, tiene a taylor disposición servicios gratuitos de asistencia lingüística. Llame al [phone].    We comply with applicable federal civil rights laws and [place] laws. We do not discriminate on the basis of race, color, national origin, age, disability, sex, sexual orientation, or gender identity.               Review of your medicines      UNREVIEWED medicines. Ask your doctor about these medicines        Dose / Directions    calcium-vitamin D 600-400 MG-UNIT per tablet   Commonly known as:  CALTRATE   Used for:  S/P lung transplant (H)        Dose:  1 tablet   Take 1 tablet by mouth 2 times daily (with meals)   Quantity:  60 tablet   Refills:  1       enoxaparin 60 MG/0.6ML injection   Commonly known as:  LOVENOX   Used for:  Acute deep vein thrombosis (DVT) of left upper extremity, unspecified vein (H), Lung transplant recipient (H)        Inject the contents of 1 syringe subcutaneously every 12 hours as directed.   Quantity:  10 Syringe   Refills:  1       levalbuterol 45 MCG/ACT Inhaler   Commonly known as:  XOPENEX HFA   Used for:  S/P lung transplant (H), Other constipation        Dose:  2 puff   Inhale 2 puffs into the lungs every 6 hours as needed for shortness of breath / dyspnea or wheezing   Quantity:  1 Inhaler   Refills:  1       magnesium oxide 400 MG tablet   Commonly known as:  MAG-OX   Used for:  Hypomagnesemia        Dose:  400 mg   Take 1 tablet (400 mg) by mouth daily   Quantity:  30 tablet   Refills:  1       metoprolol tartrate 25 MG tablet   Commonly known as:  LOPRESSOR   Used for:  Paroxysmal atrial fibrillation (H)        Dose:   25 mg   Take 1 tablet (25 mg) by mouth 2 times daily   Quantity:  60 tablet   Refills:  1       multivitamin, therapeutic with minerals Tabs tablet   Used for:  Lung transplant recipient (H)        Dose:  1 tablet   Take 1 tablet by mouth daily   Quantity:  30 each   Refills:  0       mycophenolate 250 MG capsule   Commonly known as:  GENERIC EQUIVALENT   Used for:  S/P lung transplant (H)        Dose:  1500 mg   Take 6 capsules (1,500 mg) by mouth 2 times daily   Quantity:  360 capsule   Refills:  11       nystatin 618726 UNIT/ML suspension   Commonly known as:  MYCOSTATIN   Used for:  Lung transplant recipient (H)        Dose:  10 mL   Take 10 mLs (1,000,000 Units) by mouth 4 times daily   Quantity:  1200 mL   Refills:  5       pantoprazole 40 MG EC tablet   Commonly known as:  PROTONIX   Used for:  Gastroesophageal reflux disease without esophagitis        Dose:  40 mg   Take 1 tablet (40 mg) by mouth every morning   Quantity:  30 tablet   Refills:  1       phytonadione 5 MG tablet   Commonly known as:  MEPHYTON   Used for:  Lung replaced by transplant (H)   Ask about: Should I take this medication?        Dose:  10 mg   Take 2 tablets (10 mg) by mouth once for 1 dose   Quantity:  2 tablet   Refills:  0       polyethylene glycol Packet   Commonly known as:  MIRALAX/GLYCOLAX   Used for:  Other constipation        Dose:  17 g   Take 17 g by mouth 2 times daily as needed for constipation   Quantity:  60 packet   Refills:  0       predniSONE 5 MG tablet   Commonly known as:  DELTASONE   Used for:  S/P lung transplant (H)        DateAM (mg)PM (mg) 3/22/1812.510 4/26/181010 5/24/18107.5 6/22/187.57.5 7/20/187.55 8/24/1855 9/21/1852.5   Quantity:  300 tablet   Refills:  1       senna-docusate 8.6-50 MG per tablet   Commonly known as:  SENOKOT-S;PERICOLACE   Used for:  Other constipation, S/P lung transplant (H)        Dose:  2 tablet   Take 2 tablets by mouth 2 times daily as needed for constipation   Quantity:  100  tablet   Refills:  0       sulfamethoxazole-trimethoprim 400-80 MG per tablet   Commonly known as:  BACTRIM/SEPTRA   Indication:  Preventative Medication Therapy Used Around Surgery   Used for:  Organ transplant candidate, Lung disease, interstitial (H), Abnormal chest CT, Hypotension, unspecified hypotension type, Acute on chronic respiratory failure with hypoxia (H), ILD (interstitial lung disease) (H)        Dose:  1 tablet   1 tablet by Oral or NG Tube route daily   Quantity:  30 tablet   Refills:  0       * tacrolimus 1 MG capsule   Commonly known as:  GENERIC EQUIVALENT   Used for:  S/P lung transplant (H), Other constipation        Dose:  5 mg   Take 5 capsules (5 mg) by mouth 2 times daily Total dose 5.5 mg twice daily   Quantity:  300 capsule   Refills:  11       * tacrolimus 0.5 MG capsule   Commonly known as:  GENERIC EQUIVALENT   Used for:  S/P lung transplant (H), Other constipation        Dose:  0.5 mg   Take 1 capsule (0.5 mg) by mouth 2 times daily Total dose 5.5 mg twice daily   Quantity:  120 capsule   Refills:  3       valGANciclovir 450 MG tablet   Commonly known as:  VALCYTE   Indication:  Medication Treatment to Prevent Cytomegalovirus Disease   Used for:  S/P lung transplant (H)        Dose:  900 mg   Take 2 tablets (900 mg) by mouth daily   Quantity:  60 tablet   Refills:  6       warfarin 1 MG tablet   Commonly known as:  COUMADIN   Used for:  Lung transplant recipient (H), Paroxysmal atrial fibrillation (H)        2-4mg daily, adjusted by coumadin clinic   Quantity:  100 tablet   Refills:  0       * Notice:  This list has 2 medication(s) that are the same as other medications prescribed for you. Read the directions carefully, and ask your doctor or other care provider to review them with you.      CONTINUE these medicines which have NOT CHANGED        Dose / Directions    order for DME   Used for:  ILD (interstitial lung disease) (H), Hypoxia        Equipment being ordered: Oxygen 5 liters  at rest, 15 liters with exertion.  Needs portability.  Please provide 2 10-liter concentrators for in the home   Quantity:  1 Device   Refills:  prn                Protect others around you: Learn how to safely use, store and throw away your medicines at www.disposemymeds.org.             Medication List: This is a list of all your medications and when to take them. Check marks below indicate your daily home schedule. Keep this list as a reference.      Medications           Morning Afternoon Evening Bedtime As Needed    calcium-vitamin D 600-400 MG-UNIT per tablet   Commonly known as:  CALTRATE   Take 1 tablet by mouth 2 times daily (with meals)                                enoxaparin 60 MG/0.6ML injection   Commonly known as:  LOVENOX   Inject the contents of 1 syringe subcutaneously every 12 hours as directed.                                levalbuterol 45 MCG/ACT Inhaler   Commonly known as:  XOPENEX HFA   Inhale 2 puffs into the lungs every 6 hours as needed for shortness of breath / dyspnea or wheezing                                magnesium oxide 400 MG tablet   Commonly known as:  MAG-OX   Take 1 tablet (400 mg) by mouth daily                                metoprolol tartrate 25 MG tablet   Commonly known as:  LOPRESSOR   Take 1 tablet (25 mg) by mouth 2 times daily                                multivitamin, therapeutic with minerals Tabs tablet   Take 1 tablet by mouth daily                                mycophenolate 250 MG capsule   Commonly known as:  GENERIC EQUIVALENT   Take 6 capsules (1,500 mg) by mouth 2 times daily                                nystatin 905538 UNIT/ML suspension   Commonly known as:  MYCOSTATIN   Take 10 mLs (1,000,000 Units) by mouth 4 times daily                                order for DME   Equipment being ordered: Oxygen 5 liters at rest, 15 liters with exertion.  Needs portability.  Please provide 2 10-liter concentrators for in the home                                 pantoprazole 40 MG EC tablet   Commonly known as:  PROTONIX   Take 1 tablet (40 mg) by mouth every morning                                polyethylene glycol Packet   Commonly known as:  MIRALAX/GLYCOLAX   Take 17 g by mouth 2 times daily as needed for constipation                                predniSONE 5 MG tablet   Commonly known as:  DELTASONE   DateAM (mg)PM (mg) 3/22/1812.510 4/26/181010 5/24/18107.5 6/22/187.57.5 7/20/187.55 8/24/1855 9/21/1852.5                                senna-docusate 8.6-50 MG per tablet   Commonly known as:  SENOKOT-S;PERICOLACE   Take 2 tablets by mouth 2 times daily as needed for constipation                                sulfamethoxazole-trimethoprim 400-80 MG per tablet   Commonly known as:  BACTRIM/SEPTRA   1 tablet by Oral or NG Tube route daily                                * tacrolimus 1 MG capsule   Commonly known as:  GENERIC EQUIVALENT   Take 5 capsules (5 mg) by mouth 2 times daily Total dose 5.5 mg twice daily                                * tacrolimus 0.5 MG capsule   Commonly known as:  GENERIC EQUIVALENT   Take 1 capsule (0.5 mg) by mouth 2 times daily Total dose 5.5 mg twice daily                                valGANciclovir 450 MG tablet   Commonly known as:  VALCYTE   Take 2 tablets (900 mg) by mouth daily                                warfarin 1 MG tablet   Commonly known as:  COUMADIN   2-4mg daily, adjusted by coumadin clinic                                * Notice:  This list has 2 medication(s) that are the same as other medications prescribed for you. Read the directions carefully, and ask your doctor or other care provider to review them with you.      ASK your doctor about these medications           Morning Afternoon Evening Bedtime As Needed    phytonadione 5 MG tablet   Commonly known as:  MEPHYTON   Take 2 tablets (10 mg) by mouth once for 1 dose   Ask about: Should I take this medication?

## 2018-04-10 NOTE — IP AVS SNAPSHOT
[facility], [place], Endoscopy    [address], [place] [address]    Phone:  [phone]                                       After Visit Summary   4/10/2018    Kecia Blue    MRN: [id number]           After Visit Summary Signature Page     I have received my discharge instructions, and my questions have been answered. I have discussed any challenges I see with this plan with the nurse or doctor.    ..........................................................................................................................................  Patient/Patient Representative Signature      ..........................................................................................................................................  Patient Representative Print Name and Relationship to Patient    ..................................................               ................................................  Date                                            Time    ..........................................................................................................................................  Reviewed by Signature/Title    ...................................................              ..............................................  Date                                                            Time

## 2018-04-10 NOTE — DISCHARGE INSTRUCTIONS
Discharge Instructions after Bronchoscopy with biopsies and lavage by Dr Donohue    Activity  _X__ You had medicine to relax and for pain. You may feel dizzy or sleepy.  For 24 hours:    Do not drive or use heavy equipment.    Do not make important decisions.    Do not drink any alcohol.    Diet  _X__ After CXR and if OK, then when you can swallow easily, you may go back to your regular diet, medicines  and light exercise.    Follow-up  _X__ We took small tissue and fluid samples to study. Physician will send you the results in about 10 business days or as they are complete.    Call right away if you have:    Unusual chest pain    Temperature above 100.6  F (37.5  C)    Coughing that does not stop.    If you have severe pain, bleeding, or shortness of breath, go to an emergency room.    If you have questions, call:  Monday to Friday, 7 a.m. to 4:30 p.m.  Endoscopy: [phone] (We may have to call you back)    After hours:  Hospital: [phone] (Ask for the pulmonary fellow on call)

## 2018-04-10 NOTE — MR AVS SNAPSHOT
Kecia Blue   4/10/2018   Anticoagulation Therapy Visit    Description:  57 year old female   Provider:  Joshua Davis   Department:  [facility]           INR as of 4/10/2018     Today's INR 1.10!      Anticoagulation Summary as of 4/10/2018     INR goal 2.0-3.0   Today's INR 1.10!   Full instructions 4/10: Hold; 4/11: 4 mg; 4/12: 4 mg; Otherwise No maintenance plan   Next INR check 4/13/2018    Indications   Lung transplant recipient (H) [Z94.2]  Deep vein thrombosis (DVT) (H) [I82.409] [I82.409]         April 2018 Details    Sun Mon Tue Wed Thu Fri Sat     1               2               3               4               5               6               7                 8               9               10      Hold   See details      11      4 mg         12      4 mg         13            14                 15               16               17               18               19               20               21                 22               23               24               25               26               27               28                 29               30                     Date Details   04/10 This INR check       Date of next INR:  4/13/2018         How to take your warfarin dose     To take:  4 mg Take 4 of the 1 mg tablets.    Hold Do not take your warfarin dose. See the Details table to the right for additional instructions.

## 2018-04-10 NOTE — TELEPHONE ENCOUNTER
Urgent Please  Prior Authorization [employer] Medication Request    Medication/Dose: Tacrolimus 1 mg   ICD code (if different than what is on RX):  Z94.2 Lung Transplant    Previously Tried and Failed:    Rationale:      Insurance Name:  [employer]  Insurance ID:        Pharmacy Information (if different than what is on RX)  Name:  [facility]  Phone:  [phone]

## 2018-04-10 NOTE — OR NURSING
Bronch with lavage and biopsies under conscious sedation.  Lavage in 120cc, return 60 cc from lingula.  Biopsies from lingula and left lower lobe.  Pt tolerated procedure.  Post exam pt denies CP, reports same SOB as preprocedure.

## 2018-04-10 NOTE — PROGRESS NOTES
4/10/18  Patient and spouse phoned the Coumadin clinic post bronchoscopy with biopsies today.  Will restart Lovenox tomorrow morning.  Will restart Warfarin tomorrow evening.  Patient received Vitamin K prior to procedure to ensure INR was subtherapeutic.  Reviewed with patient and spouse that INR result today may not fully reflect Vitamin K dose.  Will check an INR on Friday.  Sent [employer] message to transplant coordinator Evelyn.  Refilled Lovenox prescription at out of state pharmacy mailing service.  Answered patient and spouse's questions, and confirmed understanding of plan.    Joshua Davis RN    ANTICOAGULATION FOLLOW-UP CLINIC VISIT    Patient Name:  Kecia Blue  Date:  4/10/2018  Contact Type:  Telephone    SUBJECTIVE:     Patient Findings     Positives Change in medications (Instructed patient to restart Warfarin and Lovenox tomorrow as directed by provider.)    Comments Patient and spouse confirmed plan to restart Lovenox tomorrow a.m. And Warfarin tomorrow evening.  Patient was given a dose of Vitamin K on 4/9 prior to procedure.           OBJECTIVE    INR   Date Value Ref Range Status   04/10/2018 1.10 0.86 - 1.14 Final       ASSESSMENT / PLAN  INR assessment SUB    Recheck INR In: 3 DAYS    INR Location Clinic      Anticoagulation Summary as of 4/10/2018     INR goal 2.0-3.0   Today's INR 1.10!   Maintenance plan No maintenance plan   Full instructions 4/10: Hold; 4/11: 4 mg; 4/12: 4 mg; Otherwise No maintenance plan   Next INR check 4/13/2018   Priority INR   Target end date     Indications   Lung transplant recipient (H) [Z94.2]  Deep vein thrombosis (DVT) (H) [I82.409] [I82.409]         Anticoagulation Episode Summary     INR check location Clinic Lab    Preferred lab     Send INR reminders to [facility]    Comments Plan for 3 months of therapy- Provoked DVT. Welcome package sent . Patient staying at [facility] Verbal OK to speak with  and leave messages on Cell  [phone]      Anticoagulation Care Providers     Provider Role Specialty Phone number    Claudine Ame Patel PA-C Responsible Pulmonary [phone]            See the Encounter Report to view Anticoagulation Flowsheet and Dosing Calendar (Go to Encounters tab in chart review, and find the Anticoagulation Therapy Visit)    Spoke with patient. Refilled Lovenox.  Will restart injections tomorrow morning. Gave them their lab results and new warfarin recommendation.  No changes in health, medication, or diet. No missed doses, no falls. No signs or symptoms of bleed or clotting.    Joshua Davis, RN

## 2018-04-11 ENCOUNTER — HOSPITAL ENCOUNTER (OUTPATIENT)
Dept: CARDIAC REHAB | Facility: CLINIC | Age: 58
End: 2018-04-11
Attending: PHYSICAL MEDICINE & REHABILITATION
Payer: COMMERCIAL

## 2018-04-11 ENCOUNTER — TELEPHONE (OUTPATIENT)
Dept: TRANSPLANT | Facility: CLINIC | Age: 58
End: 2018-04-11

## 2018-04-11 VITALS — BODY MASS INDEX: 21.2 KG/M2 | WEIGHT: 123.5 LBS

## 2018-04-11 DIAGNOSIS — K59.09 OTHER CONSTIPATION: ICD-10-CM

## 2018-04-11 DIAGNOSIS — Z94.2 S/P LUNG TRANSPLANT (H): ICD-10-CM

## 2018-04-11 DIAGNOSIS — Z94.2 LUNG REPLACED BY TRANSPLANT (H): Primary | ICD-10-CM

## 2018-04-11 LAB
CMV DNA SPEC NAA+PROBE-ACNC: NORMAL [IU]/ML
CMV DNA SPEC NAA+PROBE-LOG#: NORMAL {LOG_IU}/ML
FLUAV H1 2009 PAND RNA SPEC QL NAA+PROBE: NEGATIVE
FLUAV H1 RNA SPEC QL NAA+PROBE: NEGATIVE
FLUAV H3 RNA SPEC QL NAA+PROBE: NEGATIVE
FLUAV RNA SPEC QL NAA+PROBE: NEGATIVE
FLUBV RNA SPEC QL NAA+PROBE: NEGATIVE
HADV DNA SPEC QL NAA+PROBE: NEGATIVE
HADV DNA SPEC QL NAA+PROBE: NEGATIVE
HMPV RNA SPEC QL NAA+PROBE: NEGATIVE
HPIV1 RNA SPEC QL NAA+PROBE: NEGATIVE
HPIV2 RNA SPEC QL NAA+PROBE: NEGATIVE
HPIV3 RNA SPEC QL NAA+PROBE: NEGATIVE
MICROBIOLOGIST REVIEW: NORMAL
RHINOVIRUS RNA SPEC QL NAA+PROBE: NEGATIVE
RSV RNA SPEC QL NAA+PROBE: NEGATIVE
RSV RNA SPEC QL NAA+PROBE: NEGATIVE
SPECIMEN SOURCE: NORMAL
SPECIMEN SOURCE: NORMAL

## 2018-04-11 PROCEDURE — G0239 OTH RESP PROC, GROUP: HCPCS

## 2018-04-11 PROCEDURE — 40000244 ZZH STATISTIC VISIT PULM REHAB

## 2018-04-11 RX ORDER — TACROLIMUS 1 MG/1
CAPSULE ORAL
Qty: 370 CAPSULE | Refills: 11 | Status: SHIPPED | OUTPATIENT
Start: 2018-04-11 | End: 2018-04-20

## 2018-04-11 NOTE — TELEPHONE ENCOUNTER
PA Initiation    Medication: Tacrolimus 1 mg   Insurance Company: [employer] - Phone [phone] Fax [phone]  Name:  [facility]  Phone:  [phone]  Start Date: 4/11/2018    [facility] Authorization Team   Phone: [phone]  Fax: [phone]

## 2018-04-11 NOTE — TELEPHONE ENCOUNTER
The following was discussed with patient.   LE u/s negative for DVT  Tbbx from 4/10 A0B0, cytology negative. Will continue to monitor cultures.     Repeat CBC, BMP and INR on Friday, RTC on Monday.

## 2018-04-12 LAB
ACID FAST STN SPEC QL: NORMAL
BACTERIA SPEC CULT: NORMAL
SPECIMEN SOURCE: NORMAL
SPECIMEN SOURCE: NORMAL

## 2018-04-13 ENCOUNTER — HOSPITAL ENCOUNTER (OUTPATIENT)
Dept: CARDIAC REHAB | Facility: CLINIC | Age: 58
End: 2018-04-13
Attending: PHYSICAL MEDICINE & REHABILITATION
Payer: COMMERCIAL

## 2018-04-13 ENCOUNTER — ANTICOAGULATION THERAPY VISIT (OUTPATIENT)
Dept: ANTICOAGULATION | Facility: CLINIC | Age: 58
End: 2018-04-13

## 2018-04-13 VITALS — WEIGHT: 122 LBS | BODY MASS INDEX: 20.94 KG/M2

## 2018-04-13 DIAGNOSIS — Z94.2 LUNG REPLACED BY TRANSPLANT (H): ICD-10-CM

## 2018-04-13 DIAGNOSIS — I82.622 ACUTE DEEP VEIN THROMBOSIS (DVT) OF LEFT UPPER EXTREMITY, UNSPECIFIED VEIN (H): ICD-10-CM

## 2018-04-13 DIAGNOSIS — Z94.2 LUNG TRANSPLANT RECIPIENT (H): ICD-10-CM

## 2018-04-13 LAB
ANION GAP SERPL CALCULATED.3IONS-SCNC: 8 MMOL/L (ref 3–14)
BUN SERPL-MCNC: 18 MG/DL (ref 7–30)
CALCIUM SERPL-MCNC: 8.9 MG/DL (ref 8.5–10.1)
CHLORIDE SERPL-SCNC: 104 MMOL/L (ref 94–109)
CO2 SERPL-SCNC: 27 MMOL/L (ref 20–32)
CREAT SERPL-MCNC: 1.17 MG/DL (ref 0.52–1.04)
ERYTHROCYTE [DISTWIDTH] IN BLOOD BY AUTOMATED COUNT: 18.2 % (ref 10–15)
GFR SERPL CREATININE-BSD FRML MDRD: 48 ML/MIN/1.7M2
GLUCOSE SERPL-MCNC: 99 MG/DL (ref 70–99)
HCT VFR BLD AUTO: 27.7 % (ref 35–47)
HGB BLD-MCNC: 8.7 G/DL (ref 11.7–15.7)
INR PPP: 1.03 (ref 0.86–1.14)
MCH RBC QN AUTO: 31.9 PG (ref 26.5–33)
MCHC RBC AUTO-ENTMCNC: 31.4 G/DL (ref 31.5–36.5)
MCV RBC AUTO: 102 FL (ref 78–100)
PLATELET # BLD AUTO: 347 10E9/L (ref 150–450)
POTASSIUM SERPL-SCNC: 4.3 MMOL/L (ref 3.4–5.3)
RBC # BLD AUTO: 2.73 10E12/L (ref 3.8–5.2)
SODIUM SERPL-SCNC: 139 MMOL/L (ref 133–144)
TACROLIMUS BLD-MCNC: 9.3 UG/L (ref 5–15)
TME LAST DOSE: NORMAL H
WBC # BLD AUTO: 12.4 10E9/L (ref 4–11)

## 2018-04-13 PROCEDURE — 40000244 ZZH STATISTIC VISIT PULM REHAB

## 2018-04-13 PROCEDURE — G0239 OTH RESP PROC, GROUP: HCPCS

## 2018-04-13 NOTE — MR AVS SNAPSHOT
Kecia Blue   4/13/2018   Anticoagulation Therapy Visit    Description:  57 year old female   Provider:  Anju Meyers RN   Department:  [facility]           INR as of 4/13/2018     Today's INR 1.03!      Anticoagulation Summary as of 4/13/2018     INR goal 2.0-3.0   Today's INR 1.03!   Full instructions 4/13: 4 mg; 4/14: 4 mg; 4/15: 4 mg; Otherwise No maintenance plan   Next INR check 4/16/2018    Indications   Lung transplant recipient (H) [Z94.2]  Deep vein thrombosis (DVT) (H) [I82.409] [I82.409]         April 2018 Details    Sun Mon Tue Wed Thu Fri Sat     1               2               3               4               5               6               7                 8               9               10               11               12               13      4 mg   See details      14      4 mg           15      4 mg         16            17               18               19               20               21                 22               23               24               25               26               27               28                 29               30                     Date Details   04/13 This INR check       Date of next INR:  4/16/2018         How to take your warfarin dose     To take:  4 mg Take 4 of the 1 mg tablets.

## 2018-04-13 NOTE — PROGRESS NOTES
ANTICOAGULATION FOLLOW-UP CLINIC VISIT    Patient Name:  Kecia Blue  Date:  4/13/2018  Contact Type:  Telephone    SUBJECTIVE:     Patient Findings     Comments Kecia had a bronch 4/10/18 and is bridging with lovenox.  She was given vitamin K on 4/10/18 before her bronch.  Her RX for lovenox went through a mail order pharmacy in [place] and they do not expect to receive it until 4/17/18.  They only have one syringe of lovenox left.  Spoke to Regina at the [facility]--RX for lovenox sent to pharmacy with a note on it for them to try to get an override.             OBJECTIVE    INR   Date Value Ref Range Status   04/13/2018 1.03 0.86 - 1.14 Final       ASSESSMENT / PLAN  INR assessment SUB    Recheck INR In: 3 DAYS    INR Location Clinic      Anticoagulation Summary as of 4/13/2018     INR goal 2.0-3.0   Today's INR 1.03!   Maintenance plan No maintenance plan   Full instructions 4/13: 4 mg; 4/14: 4 mg; 4/15: 4 mg; Otherwise No maintenance plan   Next INR check 4/16/2018   Priority INR   Target end date     Indications   Lung transplant recipient (H) [Z94.2]  Deep vein thrombosis (DVT) (H) [I82.409] [I82.409]         Anticoagulation Episode Summary     INR check location Clinic Lab    Preferred lab     Send INR reminders to [facility]    Comments Plan for 3 months of therapy- Provoked DVT. Welcome package sent . Patient staying at [facility] HIPPA OK and  Verbal OK to speak with  and leave messages on Cell [phone]      Anticoagulation Care Providers     Provider Role Specialty Phone number    Ame Chow PA-C Responsible Pulmonary [phone]            See the Encounter Report to view Anticoagulation Flowsheet and Dosing Calendar (Go to Encounters tab in chart review, and find the Anticoagulation Therapy Visit)    Spoke with Kecia and her .  See patient findings.  She will continue lovenox 60 mg BID until INR is therapeutic.    Anju Meyers RN

## 2018-04-13 NOTE — PROGRESS NOTES
Kecia your Prograf level is 9.3 and is within your target goal range of 9 -11. Continue your current Prograf dose, no change needed at this time. Call with questions and concerns

## 2018-04-15 ENCOUNTER — DOCUMENTATION ONLY (OUTPATIENT)
Dept: PULMONOLOGY | Facility: CLINIC | Age: 58
End: 2018-04-15

## 2018-04-15 ENCOUNTER — HOSPITAL ENCOUNTER (EMERGENCY)
Facility: CLINIC | Age: 58
Discharge: HOME | End: 2018-04-15
Attending: EMERGENCY MEDICINE | Admitting: EMERGENCY MEDICINE
Payer: COMMERCIAL

## 2018-04-15 ENCOUNTER — APPOINTMENT (OUTPATIENT)
Dept: GENERAL RADIOLOGY | Facility: CLINIC | Age: 58
End: 2018-04-15
Attending: EMERGENCY MEDICINE
Payer: COMMERCIAL

## 2018-04-15 VITALS
OXYGEN SATURATION: 96 % | HEIGHT: 64 IN | HEART RATE: 100 BPM | SYSTOLIC BLOOD PRESSURE: 149 MMHG | BODY MASS INDEX: 20.14 KG/M2 | RESPIRATION RATE: 26 BRPM | DIASTOLIC BLOOD PRESSURE: 91 MMHG | TEMPERATURE: 98.1 F | WEIGHT: 118 LBS

## 2018-04-15 DIAGNOSIS — Z94.2 LUNG TRANSPLANT RECIPIENT (H): ICD-10-CM

## 2018-04-15 DIAGNOSIS — J84.9 ILD (INTERSTITIAL LUNG DISEASE) (H): ICD-10-CM

## 2018-04-15 DIAGNOSIS — Z79.01 ANTICOAGULATED: ICD-10-CM

## 2018-04-15 DIAGNOSIS — R04.2 HEMOPTYSIS: ICD-10-CM

## 2018-04-15 LAB
ANION GAP SERPL CALCULATED.3IONS-SCNC: 10 MMOL/L (ref 3–14)
ANISOCYTOSIS BLD QL SMEAR: ABNORMAL
BASOPHILS # BLD AUTO: 0 10E9/L (ref 0–0.2)
BASOPHILS NFR BLD AUTO: 0 %
BUN SERPL-MCNC: 22 MG/DL (ref 7–30)
CALCIUM SERPL-MCNC: 9.2 MG/DL (ref 8.5–10.1)
CHLORIDE SERPL-SCNC: 100 MMOL/L (ref 94–109)
CO2 SERPL-SCNC: 27 MMOL/L (ref 20–32)
CREAT SERPL-MCNC: 1.06 MG/DL (ref 0.52–1.04)
DIFFERENTIAL METHOD BLD: ABNORMAL
EOSINOPHIL # BLD AUTO: 0 10E9/L (ref 0–0.7)
EOSINOPHIL NFR BLD AUTO: 0 %
ERYTHROCYTE [DISTWIDTH] IN BLOOD BY AUTOMATED COUNT: 18.4 % (ref 10–15)
GFR SERPL CREATININE-BSD FRML MDRD: 54 ML/MIN/1.7M2
GLUCOSE SERPL-MCNC: 105 MG/DL (ref 70–99)
HCT VFR BLD AUTO: 33.2 % (ref 35–47)
HGB BLD-MCNC: 10.1 G/DL (ref 11.7–15.7)
INR PPP: 1.25 (ref 0.86–1.14)
LYMPHOCYTES # BLD AUTO: 0.2 10E9/L (ref 0.8–5.3)
LYMPHOCYTES NFR BLD AUTO: 1.7 %
MACROCYTES BLD QL SMEAR: PRESENT
MCH RBC QN AUTO: 30.9 PG (ref 26.5–33)
MCHC RBC AUTO-ENTMCNC: 30.4 G/DL (ref 31.5–36.5)
MCV RBC AUTO: 102 FL (ref 78–100)
MONOCYTES # BLD AUTO: 0.1 10E9/L (ref 0–1.3)
MONOCYTES NFR BLD AUTO: 0.9 %
MYELOCYTES # BLD: 0.2 10E9/L
MYELOCYTES NFR BLD MANUAL: 1.8 %
NEUTROPHILS # BLD AUTO: 12 10E9/L (ref 1.6–8.3)
NEUTROPHILS NFR BLD AUTO: 95.6 %
PLATELET # BLD AUTO: 418 10E9/L (ref 150–450)
PLATELET # BLD EST: ABNORMAL 10*3/UL
POTASSIUM SERPL-SCNC: 4.6 MMOL/L (ref 3.4–5.3)
RBC # BLD AUTO: 3.27 10E12/L (ref 3.8–5.2)
SODIUM SERPL-SCNC: 136 MMOL/L (ref 133–144)
WBC # BLD AUTO: 12.5 10E9/L (ref 4–11)

## 2018-04-15 PROCEDURE — 85610 PROTHROMBIN TIME: CPT | Performed by: EMERGENCY MEDICINE

## 2018-04-15 PROCEDURE — 99284 EMERGENCY DEPT VISIT MOD MDM: CPT | Mod: 25 | Performed by: EMERGENCY MEDICINE

## 2018-04-15 PROCEDURE — 93005 ELECTROCARDIOGRAM TRACING: CPT | Performed by: EMERGENCY MEDICINE

## 2018-04-15 PROCEDURE — 85025 COMPLETE CBC W/AUTO DIFF WBC: CPT | Performed by: EMERGENCY MEDICINE

## 2018-04-15 PROCEDURE — 99285 EMERGENCY DEPT VISIT HI MDM: CPT | Mod: 25 | Performed by: EMERGENCY MEDICINE

## 2018-04-15 PROCEDURE — 36415 COLL VENOUS BLD VENIPUNCTURE: CPT | Performed by: EMERGENCY MEDICINE

## 2018-04-15 PROCEDURE — 99283 EMERGENCY DEPT VISIT LOW MDM: CPT | Performed by: EMERGENCY MEDICINE

## 2018-04-15 PROCEDURE — 80048 BASIC METABOLIC PNL TOTAL CA: CPT | Performed by: EMERGENCY MEDICINE

## 2018-04-15 PROCEDURE — 93010 ELECTROCARDIOGRAM REPORT: CPT | Mod: Z6 | Performed by: EMERGENCY MEDICINE

## 2018-04-15 PROCEDURE — 71046 X-RAY EXAM CHEST 2 VIEWS: CPT

## 2018-04-15 ASSESSMENT — ENCOUNTER SYMPTOMS
COUGH: 1
SHORTNESS OF BREATH: 1
ABDOMINAL PAIN: 0
FEVER: 0

## 2018-04-15 NOTE — ED AVS SNAPSHOT
[facility], Emergency Department    [address] [address]    Phone:  [phone]                                       Kecia Blue   MRN: [id number]    Department:  [facility], Emergency Department   Date of Visit:  4/15/2018           Patient Information     Date Of Birth          1960        Your diagnoses for this visit were:     Hemoptysis     ILD (interstitial lung disease) (H)     Lung transplant recipient (H)     Anticoagulated        You were seen by Kortney Hdz MD.      Follow-up Information     Follow up with Pulmonology tomorrow. Go in 1 day.      Your next 10 appointments already scheduled     Apr 16, 2018  7:00 AM CDT   PFT VISIT with  PFL A   [facility] Pulmonary Function Testing ([facility])    [address] [place] [id number] [phone]            Apr 16, 2018  7:30 AM CDT   Lab with  LAB   [facility] Lab ([facility])    [address] [place] [id number] [phone]            Apr 16, 2018  7:45 AM CDT   (Arrive by 7:30 AM)   XR CHEST 2 VIEWS with UCXR1   [facility] Xray ([facility])    [address] [place] [id number] [phone]           Please bring a list of your current medicines to your exam. (Include vitamins, minerals and over-thecounter medicines.) Leave your valuables at home.  Tell your doctor if there is a chance you may be pregnant.  You do not need to do anything special for this exam.            Apr 16, 2018  8:00 AM CDT   (Arrive by 7:45 AM)   Return Lung Transplant with Alex Hale MD   [facility] Solid Organ Transplant ([facility])    [address] [place] [address] [id number] [phone]            Apr 18, 2018 10:00 AM CDT   Pulmonary Treatment with  PULMONARY REHAB 2   [facility], Cardiac Rehabilitation  ([facility])    [address]  [facility] [address]   [phone]            Apr 20, 2018 10:00 AM CDT   Pulmonary Treatment with UR PULMONARY REHAB 2   [facility], Cardiac Rehabilitation ([facility])    [address]  [facility] [address]   [phone]            Apr 25, 2018 10:00 AM CDT   Pulmonary Treatment with UR PULMONARY REHAB 2   [facility], Cardiac Rehabilitation ([facility])    [address]  [facility] [address]   [phone]            Apr 27, 2018 10:00 AM CDT   Pulmonary Treatment with UR PULMONARY REHAB 2   [facility], Cardiac Rehabilitation ([facility])    [address]  [facility] [address]   [phone]            May 02, 2018 10:00 AM CDT   Pulmonary Treatment with UR PULMONARY REHAB 2   [facility], Cardiac Rehabilitation ([facility])    [address]  [facility] [address]   [phone]            May 04, 2018 10:00 AM CDT   Pulmonary Treatment with UR PULMONARY REHAB 2   [facility], Cardiac Rehabilitation ([facility])    [address]  [facility] [address]   [phone]              24 Hour Appointment Hotline       To make an appointment at any [facility] clinic, call [phone] ([phone]). If you don't have a family doctor or clinic, we will help you find one. [facility] clinics are conveniently located to serve the needs of you and your family.             Review of your medicines      Our records show that you are taking the medicines  listed below. If these are incorrect, please call your family doctor or clinic.        Dose / Directions Last dose taken    calcium-vitamin D 600-400 MG-UNIT per tablet   Commonly known as:  CALTRATE   Dose:  1 tablet   Quantity:  60 tablet        Take 1 tablet by mouth 2 times daily (with meals)   Refills:  1        * enoxaparin 60 MG/0.6ML injection   Commonly known as:  LOVENOX   Quantity:  10 Syringe        Inject the contents of 1 syringe subcutaneously every 12 hours as directed.   Refills:  1        * enoxaparin 60 MG/0.6ML injection   Commonly known as:  LOVENOX   Quantity:  25 Syringe        Please inject entire syringe subcutaneously into abdomen every 12 hours daily.   Refills:  1        * enoxaparin 60 MG/0.6ML injection   Commonly known as:  LOVENOX   Quantity:  10 Syringe        Inject 60 mg subcutaneously every 12 hours as directed by Anticoagulation Clinic   Refills:  0        levalbuterol 45 MCG/ACT Inhaler   Commonly known as:  XOPENEX HFA   Dose:  2 puff   Quantity:  1 Inhaler        Inhale 2 puffs into the lungs every 6 hours as needed for shortness of breath / dyspnea or wheezing   Refills:  1        magnesium oxide 400 MG tablet   Commonly known as:  MAG-OX   Dose:  400 mg   Quantity:  30 tablet        Take 1 tablet (400 mg) by mouth daily   Refills:  1        metoprolol tartrate 25 MG tablet   Commonly known as:  LOPRESSOR   Dose:  25 mg   Quantity:  60 tablet        Take 1 tablet (25 mg) by mouth 2 times daily   Refills:  1        multivitamin, therapeutic with minerals Tabs tablet   Dose:  1 tablet   Quantity:  30 each        Take 1 tablet by mouth daily   Refills:  0        mycophenolate 250 MG capsule   Commonly known as:  GENERIC EQUIVALENT   Dose:  1500 mg   Quantity:  360 capsule        Take 6 capsules (1,500 mg) by mouth 2 times daily   Refills:  11        nystatin 940275 UNIT/ML suspension   Commonly known as:  MYCOSTATIN   Dose:  10 mL   Quantity:  1200 mL        Take 10 mLs  (1,000,000 Units) by mouth 4 times daily   Refills:  5        order for DME   Quantity:  1 Device        Equipment being ordered: Oxygen 5 liters at rest, 15 liters with exertion.  Needs portability.  Please provide 2 10-liter concentrators for in the home   Refills:  prn        pantoprazole 40 MG EC tablet   Commonly known as:  PROTONIX   Dose:  40 mg   Quantity:  30 tablet        Take 1 tablet (40 mg) by mouth every morning   Refills:  1        polyethylene glycol Packet   Commonly known as:  MIRALAX/GLYCOLAX   Dose:  17 g   Quantity:  60 packet        Take 17 g by mouth 2 times daily as needed for constipation   Refills:  0        predniSONE 5 MG tablet   Commonly known as:  DELTASONE   Quantity:  300 tablet        DateAM (mg)PM (mg) 3/22/1812.510 4/26/181010 5/24/18107.5 6/22/187.57.5 7/20/187.55 8/24/1855 9/21/1852.5   Refills:  1        senna-docusate 8.6-50 MG per tablet   Commonly known as:  SENOKOT-S;PERICOLACE   Dose:  2 tablet   Quantity:  100 tablet        Take 2 tablets by mouth 2 times daily as needed for constipation   Refills:  0        sulfamethoxazole-trimethoprim 400-80 MG per tablet   Commonly known as:  BACTRIM/SEPTRA   Dose:  1 tablet   Indication:  Preventative Medication Therapy Used Around Surgery   Quantity:  30 tablet        1 tablet by Oral or NG Tube route daily   Refills:  0        tacrolimus 1 MG capsule   Commonly known as:  GENERIC EQUIVALENT   Quantity:  370 capsule        Take 5 mg in the AM and 4 mg in the PM   Refills:  11        valGANciclovir 450 MG tablet   Commonly known as:  VALCYTE   Dose:  900 mg   Indication:  Medication Treatment to Prevent Cytomegalovirus Disease   Quantity:  60 tablet        Take 2 tablets (900 mg) by mouth daily   Refills:  6        warfarin 1 MG tablet   Commonly known as:  COUMADIN   Quantity:  100 tablet        2-4mg daily, adjusted by coumadin clinic   Refills:  0        * Notice:  This list has 3 medication(s) that are the same as other  medications prescribed for you. Read the directions carefully, and ask your doctor or other care provider to review them with you.            Procedures and tests performed during your visit     Basic metabolic panel    CBC with platelets differential    Chest XR,  PA & LAT    EKG 12 lead    INR      Orders Needing Specimen Collection     None      Pending Results     Date and Time Order Name Status Description    4/15/2018 0625 EKG 12 lead Preliminary             Pending Culture Results     No orders found from 4/13/2018 to 4/16/2018.            Pending Results Instructions     If you had any lab results that were not finalized at the time of your Discharge, you can call the ED Lab Result RN at [phone]. You will be contacted by this team for any positive Lab results or changes in treatment. The nurses are available 7 days a week from 10A to 6:30P.  You can leave a message 24 hours per day and they will return your call.        Thank you for choosing [facility]       Thank you for choosing [facility] for your care. Our goal is always to provide you with excellent care. Hearing back from our patients is one way we can continue to improve our services. Please take a few minutes to complete the written survey that you may receive in the mail after you visit with us. Thank you!        [employer] Information     whoactually gives you secure access to your electronic health record. If you see a primary care provider, you can also send messages to your care team and make appointments. If you have questions, please call your primary care clinic.  If you do not have a primary care provider, please call [phone] and they will assist you.        Care EveryWhere ID     This is your Care EveryWhere ID. This could be used by other organizations to access your [facility] medical records  [id number]        Equal Access to Services     ALBAN VALENCIA : yasmin Wright qaybta kaalmada adeegyada, waxay  pennie daily ah. So [facility] [phone].    ATENCIÓN: Si habla español, tiene a taylor disposición servicios gratuitos de asistencia lingüística. Llame al [phone].    We comply with applicable federal civil rights laws and [place] laws. We do not discriminate on the basis of race, color, national origin, age, disability, sex, sexual orientation, or gender identity.            After Visit Summary       This is your record. Keep this with you and show to your community pharmacist(s) and doctor(s) at your next visit.

## 2018-04-15 NOTE — ED NOTES
8:14 AM  Patient was signed out to me pending pulmonology consult and discussion with patient.  She is on anticoagulation and had a scant amount of hemoptysis earlier today.  The emergency room, she was observed with chest x-ray that is improving and revealing no concerns.  The case was discussed at length with Dr. Figueroa.  It was felt patient could be discharged to follow-up as previously scheduled appointment tomorrow.  She will also hold her Lovenox for today.     Killian Anderson MD  04/15/18 3036

## 2018-04-15 NOTE — ED NOTES
Pt had lung transplant on 3/1. She's had ongoing chest pains from the healing, but she states the pain is worse today. She coughed up blood this morning and was told to come to the ER if that happens.

## 2018-04-15 NOTE — ED PROVIDER NOTES
History     Chief Complaint   Patient presents with     Chest Pain     HPI  Kecia Blue is a 57 year old female with a history of lung transplant on 3/1/2018, secondary to interstitial lung disease, who presents to the ER with complaint of chest pain this morning as well as episode of small hemoptysis.  She states she has had ongoing diffuse chest discomfort since the transplant, which was related to postoperative healing.  However, she states over the last day or 2 she feels the pain is slightly worse.  It does have a pleuritic component, which is not new.  She has had cough over the last week or so, which has primarily been dry.  This morning, however, she states after waking up, she coughed, coughed up some bright red blood.  She was told by her doctors that if this happens she should come to the ER.  She is currently on Coumadin for DVT, as well as a Lovenox bridge.  She had a lung biopsy last Tuesday.  She states that she has had nosebleeds in the past, though the last one was greater than 1 week ago.  She denies any fever, abdominal pain, nausea, vomiting, diarrhea.  No lower extremity pain.  She had some mild swelling a few days ago, though wore her compression stockings, and this is now improved.    Past Medical History:   Diagnosis Date     Antisynthetase syndrome (H) 2014     Chronic cough      Dermatomyositis (H)      Dysplasia of cervix, low grade (ESTRADA 1)      ILD (interstitial lung disease) (H)      Osteopenia      Pulmonary hypertension      Raynaud's disease      Seronegative rheumatoid arthritis (H)        Past Surgical History:   Procedure Laterality Date     BRONCHOSCOPY (RIGID OR FLEXIBLE), DIAGNOSTIC N/A 4/10/2018    Procedure: COMBINED BRONCHOSCOPY (RIGID OR FLEXIBLE), LAVAGE;;  Surgeon: Mariposa Donohue MD;  Location:  GI     BRONCHOSCOPY FLEXIBLE N/A 3/13/2018    Procedure: BRONCHOSCOPY FLEXIBLE;  Flexible Bronchoscopy ;  Surgeon: Gissell Sanchez MD;  Location:  GI     ENT  "SURGERY      tonsillectomy as a child     INSERT EXTRACORPORAL MEMBRANE OXYGENATOR ADULT (OUTSIDE OR) N/A 2/27/2018    Procedure: INSERT EXTRACORPORAL MEMBRANE OXYGENATOR ADULT (OUTSIDE OR);  INSERT EXTRACORPORAL MEMBRANE OXYGENATOR ADULT (OUTSIDE OR) ;  Surgeon: Toby Hernandez MD;  Location: [facility] OR     no prior surgery       REMOVE EXTRACORPORAL MEMBRANE OXYGENATOR ADULT N/A 3/3/2018    Procedure: REMOVE EXTRACORPORAL MEMBRANE OXYGENATOR ADULT;  Removal of Right Femoral Venous and Right Axillary Arterial Extracorporeal Membrane Oxygenator;  Surgeon: Toby Hernandez MD;  [facility]     TRANSPLANT LUNG RECIPIENT SINGLE X2 Bilateral 3/1/2018    Procedure: TRANSPLANT LUNG RECIPIENT SINGLE X2;  Median Sternotomy, Extracorporeal Membrane Oxygenator, bilateral sequential lung transplant;  Surgeon: Toby Hernandez MD;  Location: [facility] OR       Family History   Problem Relation Age of Onset     Hypertension Mother      Arthritis Mother      Pancreatic Cancer Father      DIABETES Father        Social History   Substance Use Topics     Smoking status: Never Smoker     Smokeless tobacco: Never Used     Alcohol use No         I have reviewed the Medications, Allergies, Past Medical and Surgical History, and Social History in the Epic system.    Review of Systems   Constitutional: Negative for fever.   Respiratory: Positive for cough and shortness of breath.    Cardiovascular: Positive for chest pain.   Gastrointestinal: Negative for abdominal pain.   All other systems reviewed and are negative.      Physical Exam   BP: (!) 145/102  Pulse: 100  Heart Rate: 101  Temp: 98.1  F (36.7  C)  Resp: 18  Height: 162.6 cm (5' 4\")  Weight: 53.5 kg (118 lb)  SpO2: 98 %      Physical Exam   Constitutional: No distress.   HENT:   Head: Atraumatic.   Mouth/Throat: Oropharynx is clear and moist. No oropharyngeal exudate.   Evidence of in the left naris   Eyes: Pupils are equal, round, and reactive to light. No " scleral icterus.   Cardiovascular: Normal heart sounds and intact distal pulses.    Pulmonary/Chest: No respiratory distress.   Decreased breath sounds at the right base   Abdominal: Soft. There is no tenderness.   Musculoskeletal: She exhibits no edema or tenderness.   Skin: Skin is warm. No rash noted. She is not diaphoretic.       ED Course     ED Course     Procedures             Critical Care time:  none              EKG Interpretation:      Interpreted by Kortney Hdz  Time reviewed:0640  Symptoms at time of EKG: cp   Rhythm: Normal sinus  and Sinus tachycardia  Rate: 104  Axis: Normal  Ectopy:Premature atrial contraction  Conduction:  Right bundle branch block (incomplete)  ST Segments/ T Waves: T wave inversion Lateral and Inferior  Q Waves: None  Comparison to prior: stable when compared with previous    Clinical Impression: stable EKG           Labs Ordered and Resulted from Time of ED Arrival Up to the Time of Departure from the ED   CBC WITH PLATELETS DIFFERENTIAL - Abnormal; Notable for the following:        Result Value    WBC 12.5 (*)     RBC Count 3.27 (*)     Hemoglobin 10.1 (*)     Hematocrit 33.2 (*)      (*)     MCHC 30.4 (*)     RDW 18.4 (*)     Absolute Neutrophil 12.0 (*)     Absolute Lymphocytes 0.2 (*)     Absolute Myelocytes 0.2 (*)     All other components within normal limits   INR - Abnormal; Notable for the following:     INR 1.25 (*)     All other components within normal limits   BASIC METABOLIC PANEL - Abnormal; Notable for the following:     Glucose 105 (*)     Creatinine 1.06 (*)     GFR Estimate 54 (*)     All other components within normal limits            Assessments & Plan (with Medical Decision Making)   Chest x-ray appears improving, per radiology provisional report.  CBC reveals a stable white count, slightly improved hemoglobin.  She is currently anticoagulated.  I do wonder if she may have had some bleeding from her nose overnight that she aspirated while  sleeping.  I did speak with Dr. Figueroa, who is on for pulmonary.  She was contemplating holding the patient's Lovenox for the day, having her follow-up tomorrow.  She will come down and see the patient.  The patient be signed out to the oncoming provider pending Dr. Figueroa visit with the patient.    Dictation Disclaimer: Some of this Note has been completed with voice-recognition dictation software. Although errors are generally corrected real-time, there is the potential for a rare error to be present in the completed chart.      I have reviewed the nursing notes.    I have reviewed the findings, diagnosis, plan and need for follow up with the patient.    New Prescriptions    No medications on file       Final diagnoses:   Hemoptysis   ILD (interstitial lung disease) (H)   Lung transplant recipient (H)   Anticoagulated       4/15/2018   [facility], [place], EMERGENCY DEPARTMENT     Kortney Hdz MD  04/15/18 0757

## 2018-04-15 NOTE — ED AVS SNAPSHOT
[facility] [place], Emergency Department    [address] [place] [address]    Phone:  [phone]                                       Kecia Blue   MRN: [id number]    Department:  [facility], Emergency Department   Date of Visit:  4/15/2018           After Visit Summary Signature Page     I have received my discharge instructions, and my questions have been answered. I have discussed any challenges I see with this plan with the nurse or doctor.    ..........................................................................................................................................  Patient/Patient Representative Signature      ..........................................................................................................................................  Patient Representative Print Name and Relationship to Patient    ..................................................               ................................................  Date                                            Time    ..........................................................................................................................................  Reviewed by Signature/Title    ...................................................              ..............................................  Date                                                            Time

## 2018-04-16 ENCOUNTER — RADIANT APPOINTMENT (OUTPATIENT)
Dept: ULTRASOUND IMAGING | Facility: CLINIC | Age: 58
End: 2018-04-16
Attending: INTERNAL MEDICINE
Payer: COMMERCIAL

## 2018-04-16 ENCOUNTER — ANTICOAGULATION THERAPY VISIT (OUTPATIENT)
Dept: ANTICOAGULATION | Facility: CLINIC | Age: 58
End: 2018-04-16

## 2018-04-16 ENCOUNTER — HOSPITAL ENCOUNTER (OUTPATIENT)
Facility: CLINIC | Age: 58
Setting detail: SPECIMEN
Discharge: HOME | End: 2018-04-16
Admitting: INTERNAL MEDICINE
Payer: COMMERCIAL

## 2018-04-16 ENCOUNTER — RADIANT APPOINTMENT (OUTPATIENT)
Dept: GENERAL RADIOLOGY | Facility: CLINIC | Age: 58
End: 2018-04-16
Payer: COMMERCIAL

## 2018-04-16 ENCOUNTER — OFFICE VISIT (OUTPATIENT)
Dept: TRANSPLANT | Facility: CLINIC | Age: 58
End: 2018-04-16
Attending: INTERNAL MEDICINE
Payer: COMMERCIAL

## 2018-04-16 VITALS
HEIGHT: 64 IN | WEIGHT: 116 LBS | SYSTOLIC BLOOD PRESSURE: 123 MMHG | HEART RATE: 58 BPM | TEMPERATURE: 98.5 F | DIASTOLIC BLOOD PRESSURE: 86 MMHG | RESPIRATION RATE: 18 BRPM | OXYGEN SATURATION: 94 % | BODY MASS INDEX: 19.81 KG/M2

## 2018-04-16 DIAGNOSIS — I74.9 THROMBOEMBOLISM (H): Primary | ICD-10-CM

## 2018-04-16 DIAGNOSIS — Z94.2 LUNG TRANSPLANT RECIPIENT (H): Primary | ICD-10-CM

## 2018-04-16 DIAGNOSIS — Z94.2 LUNG REPLACED BY TRANSPLANT (H): ICD-10-CM

## 2018-04-16 DIAGNOSIS — Z94.2 LUNG TRANSPLANT RECIPIENT (H): ICD-10-CM

## 2018-04-16 DIAGNOSIS — Z79.899 ENCOUNTER FOR LONG-TERM (CURRENT) USE OF HIGH-RISK MEDICATION: ICD-10-CM

## 2018-04-16 DIAGNOSIS — I82.602 ARM VEIN BLOOD CLOT, LEFT: ICD-10-CM

## 2018-04-16 DIAGNOSIS — I82.622 ACUTE DEEP VEIN THROMBOSIS (DVT) OF LEFT UPPER EXTREMITY, UNSPECIFIED VEIN (H): ICD-10-CM

## 2018-04-16 LAB
ANION GAP SERPL CALCULATED.3IONS-SCNC: 9 MMOL/L (ref 3–14)
BUN SERPL-MCNC: 23 MG/DL (ref 7–30)
CALCIUM SERPL-MCNC: 9.1 MG/DL (ref 8.5–10.1)
CHLORIDE SERPL-SCNC: 101 MMOL/L (ref 94–109)
CO2 SERPL-SCNC: 27 MMOL/L (ref 20–32)
CREAT SERPL-MCNC: 0.99 MG/DL (ref 0.52–1.04)
ERYTHROCYTE [DISTWIDTH] IN BLOOD BY AUTOMATED COUNT: 18.1 % (ref 10–15)
GFR SERPL CREATININE-BSD FRML MDRD: 58 ML/MIN/1.7M2
GLUCOSE SERPL-MCNC: 122 MG/DL (ref 70–99)
HCT VFR BLD AUTO: 30.3 % (ref 35–47)
HGB BLD-MCNC: 9.4 G/DL (ref 11.7–15.7)
INR PPP: 1.3 (ref 0.86–1.14)
INTERPRETATION ECG - MUSE: NORMAL
MAGNESIUM SERPL-MCNC: 1.7 MG/DL (ref 1.6–2.3)
MCH RBC QN AUTO: 31.6 PG (ref 26.5–33)
MCHC RBC AUTO-ENTMCNC: 31 G/DL (ref 31.5–36.5)
MCV RBC AUTO: 102 FL (ref 78–100)
PLATELET # BLD AUTO: 385 10E9/L (ref 150–450)
POTASSIUM SERPL-SCNC: 4.7 MMOL/L (ref 3.4–5.3)
RBC # BLD AUTO: 2.97 10E12/L (ref 3.8–5.2)
SODIUM SERPL-SCNC: 137 MMOL/L (ref 133–144)
TACROLIMUS BLD-MCNC: 8.1 UG/L (ref 5–15)
TME LAST DOSE: NORMAL H
WBC # BLD AUTO: 10.7 10E9/L (ref 4–11)

## 2018-04-16 PROCEDURE — 80048 BASIC METABOLIC PNL TOTAL CA: CPT | Performed by: INTERNAL MEDICINE

## 2018-04-16 PROCEDURE — 36415 COLL VENOUS BLD VENIPUNCTURE: CPT | Performed by: INTERNAL MEDICINE

## 2018-04-16 PROCEDURE — 80197 ASSAY OF TACROLIMUS: CPT | Performed by: INTERNAL MEDICINE

## 2018-04-16 PROCEDURE — G0463 HOSPITAL OUTPT CLINIC VISIT: HCPCS | Mod: ZF

## 2018-04-16 PROCEDURE — 83735 ASSAY OF MAGNESIUM: CPT | Performed by: INTERNAL MEDICINE

## 2018-04-16 PROCEDURE — 85610 PROTHROMBIN TIME: CPT | Performed by: INTERNAL MEDICINE

## 2018-04-16 PROCEDURE — 85027 COMPLETE CBC AUTOMATED: CPT | Performed by: INTERNAL MEDICINE

## 2018-04-16 ASSESSMENT — PAIN SCALES - GENERAL: PAINLEVEL: SEVERE PAIN (6)

## 2018-04-16 NOTE — PROGRESS NOTES
ANTICOAGULATION FOLLOW-UP CLINIC VISIT    Patient Name:  Kecia Blue  Date:  4/16/2018  Contact Type:  Telephone    SUBJECTIVE:     Patient Findings     Comments Instructions left to continue with lovenox 60 mg BID           OBJECTIVE    INR   Date Value Ref Range Status   04/16/2018 1.30 (H) 0.86 - 1.14 Final       ASSESSMENT / PLAN  No question data found.  Anticoagulation Summary as of 4/16/2018     INR goal 2.0-3.0   Today's INR 1.30!   Maintenance plan No maintenance plan   Full instructions 4/16: 6 mg; 4/17: 6 mg; Otherwise No maintenance plan   Next INR check 4/18/2018   Priority INR   Target end date     Indications   Lung transplant recipient (H) [Z94.2]  Deep vein thrombosis (DVT) (H) [I82.409] [I82.409]         Anticoagulation Episode Summary     INR check location Clinic Lab    Preferred lab     Send INR reminders to [facility]    Comments Plan for 3 months of therapy- Provoked DVT. Welcome package sent . Patient staying at [facility] HIPPA OK and  Verbal OK to speak with  and leave messages on Cell [phone]      Anticoagulation Care Providers     Provider Role Specialty Phone number    Ame Chow PA-C Responsible Pulmonary [phone]            See the Encounter Report to view Anticoagulation Flowsheet and Dosing Calendar (Go to Encounters tab in chart review, and find the Anticoagulation Therapy Visit)    Left message for patient with results and dosing recommendations. Asked patient to call back to report any missed doses, falls, signs and symptoms of bleeding or clotting, any changes in health, medication, or diet. Asked patient to call back with any questions or concerns.      Judith Salazar RN    ADDENDUM:  Pt phones a few minutes later & the instructions are relayed to the pt verbally  Angelito GIL       SECOND ADDENDUM FROM 4/16:  Shortly after this encounter, a message is rec'd from transplant dept stating pt was seen in ED on 4/14 with dark colored  hemoptysis.  The MD chose to hold the lovenox on 4/14.  The dosing plan given for today was relayed to transplant dept with request to advise, should they desire anything different be done.  Angelito GIL

## 2018-04-16 NOTE — NURSING NOTE
"Chief Complaint   Patient presents with     RECHECK     S/P Lung TX 3/1/2018       Initial /86 (BP Location: Right arm, Patient Position: Sitting, Cuff Size: Adult Regular)  Pulse 58  Temp 98.5  F (36.9  C) (Oral)  Resp 18  Ht 1.626 m (5' 4\")  Wt 52.6 kg (116 lb)  LMP 06/07/2014 (Exact Date)  SpO2 94%  BMI 19.91 kg/m2 Estimated body mass index is 19.91 kg/(m^2) as calculated from the following:    Height as of this encounter: 1.626 m (5' 4\").    Weight as of this encounter: 52.6 kg (116 lb).  Medication Reconciliation: complete     Gianna Rogers [facility]  4/16/2018 8:09 AM        "

## 2018-04-16 NOTE — PROGRESS NOTES
Pulmonary Medicine  Post- Lung Transplant Clinic Note   April 16, 2018       Patient: Kecia Blue  MRN: [id number]  Transplant Date: 2/21/2018 (POD# 46)           Assessment and Plan:   Mrs. Kecia Blue is a 58 y/o female with h/o dermatomyositis, seronegative RA, ILD and pulmonary hypertension who was admitted for emergent lung transplant workup on 2/21. [facility] on [facility] and subsequently received a bilateral lung transplant on 3/1/18 (POD# 81). Post operative course complicated by right-sided pleural effusion s/p thoracentesis. She is seen today for routine follow up.     Problem List:  1. S/p lung transplant patient with pulmonary complaints, cxr without acute pulmonary process and spirometry is improving   - continue with 3-drug immunosuppression and transplant prophylaxis   - will f/u tac level and dose adjust accordingly (goal 10 -15)   - encourage daily physical activity    RTC in 1 week for ROS, history and physical examination, labs including spirometry and cxray        Subjective & Interval History:   Patient presents today for routine clinic visit. Her  was also in attendance. She had an initial question of which medication is causing her shakiness. We talked about the tacrolimus and deconditioning. She has no respiratory complaints in particular no cough. She does has chest pain with movement which is improving and responds to aspirin and tylenol. She denies diarrhea, constipation, black or tarry stools. She states she is increasing her activity level but is limited due to the snow. She also reports/gives a heads up that her medications come from [place]. She reports that she was recently seen in the emergency for one episode of hemoptysis. Of note she had a transbronchial biopsy last week and is on coumadin for DVT/afib. W/u in ER was unremarkable and clear chest xray.   She does complain of PND.       Family and social histories were  reviewed with patient. There are no significant changes  Family History   Problem Relation Age of Onset     Hypertension Mother      Arthritis Mother      Pancreatic Cancer Father      DIABETES Father      Social History   Substance Use Topics     Smoking status: Never Smoker     Smokeless tobacco: Never Used     Alcohol use No            Review of Systems:   Please see HPI,  ROS: 10 point ROS neg other than the symptoms noted above in the HPI.            Medications:     Current Outpatient Prescriptions   Medication     tacrolimus (GENERIC EQUIVALENT) 1 MG capsule     valGANciclovir (VALCYTE) 450 MG tablet     mycophenolate (GENERIC EQUIVALENT) 250 MG capsule     nystatin (MYCOSTATIN) 482719 UNIT/ML suspension     polyethylene glycol (MIRALAX/GLYCOLAX) Packet     warfarin (COUMADIN) 1 MG tablet     levalbuterol (XOPENEX HFA) 45 MCG/ACT Inhaler     senna-docusate (SENOKOT-S;PERICOLACE) 8.6-50 MG per tablet     calcium-vitamin D (CALTRATE) 600-400 MG-UNIT per tablet     magnesium oxide (MAG-OX) 400 MG tablet     metoprolol tartrate (LOPRESSOR) 25 MG tablet     pantoprazole (PROTONIX) 40 MG EC tablet     sulfamethoxazole-trimethoprim (BACTRIM/SEPTRA) 400-80 MG per tablet     multivitamin, therapeutic with minerals (THERA-VIT-M) TABS tablet     predniSONE (DELTASONE) 5 MG tablet     enoxaparin (LOVENOX) 60 MG/0.6ML injection     order for DME     No current facility-administered medications for this visit.             Physical Exam:     PHYSICAL EXAMINATION:   GEN: Awake and alert, in no acute distress, sitting upright in chair. Pleasant and cooperative  HEENT: Pupils equal and reactive to light, conjunctiva are pink conjunctivae, sclera anicteric,  Oral mucosa moist without lesions.  NECK: supple no JVD/LAD  PULM: Good air flow bilaterally, symmetric in expansion, Scattered crackles to lower lobes. No rhonchi, no wheezes. Breathing non-labored.   CV: Normal S1 and S2. RRR.  No murmur, gallop, or rub.  No peripheral  "edema.  Peripheral pulses intact.   ABD: Soft, nontender, nondistended. Bowel sound normoactive, no guarding, no rebound.   MSK: .  No apparent muscle wasting. No clubbing, cyanosis, or edema  NEURO: Alert and oriented to person, place, and time. , motor 5/5 upper/lower extremity b/l, sensation grossly intact to touch, gait normal  SKIN: Warm and dry. No visible rashes or lesions   PSYCH: Mood stable, judgment and insight appropriate.           Data:     All vital signs, laboratory and imaging data for the past 24 hours reviewed.      Vital signs:  Temp: 98.5  F (36.9  C) Temp src: Oral BP: 123/86 Pulse: 58   Resp: 18 SpO2: 94 % (RA)     Height: 162.6 cm (5' 4\") Weight: 52.6 kg (116 lb)    Weight trend:   Vitals:    04/16/18 0806   Weight: 52.6 kg (116 lb)        Labs    CXR 4/16/18 report and film personally reviewed by me  1. Previously described loculated left pleural effusion has decreased  in size. Trace right pleural effusion.  2. No acute consolidation.  3. Mildly dilated loops of bowel in the midabdomen    PFT interpretation 4/16/18: Maneuver: valid and meets ATS guidelines  1. FVC 1.4 (43% pred)  2 FEV1 1.09 (42% pred)  3. Normal ratio with decreased FEV1 and FVC  The reduced FEV1  is consistent with severe obstruction. The decrease in FVC may represent restrictive physiology.  Lung volumes would be necessary to determine.   Compared to prior: Her spirometry has increased in comparison to her most recent spirometry. Today's FEV1 is her post-transplant best    Patient was seen and examined by me. I personally reviewed the electronic medical record including labs, flowsheets, imaging reports and films, vitals, and medications.  Clinical update provided to the patient and her , I answered all of their questions and provided them support    Alex Hale MD, [facility]   of Medicine  Pulmonary, Critical Care and Sleep Medicine  [facility]  Pager: [phone]    "

## 2018-04-16 NOTE — NURSING NOTE
Transplant Coordinator Note    Reason for visit: Post lung transplant follow up visit   Coordinator: Present   Caregiver:      Health concerns addressed today:  1. Kecia Blue is a 57 year old female with a history of lung transplant on 3/1/2018, secondary to interstitial lung disease, who presents to the ER with complaint of chest pain this morning as well as episode of small hemoptysis.  She states she has had ongoing diffuse chest discomfort since the transplant, which was related to postoperative healing.  However, she states over the last day or 2 she feels the pain is slightly worse.  It does have a pleuritic component, which is not new.  She has had cough over the last week or so, which has primarily been dry.  This morning, however, she states after waking up, she coughed, coughed up some bright red blood.  She was told by her doctors that if this happens she should come to the ER.  She is currently on Coumadin for DVT, as well as a Lovenox bridge.  She had a lung biopsy last Tuesday.    DVT left arm.  Treat 3-6 months.    Dr Figueroa came to see her in ER.  Asked her to hold lovenox yesterday.  INR today is 1.3.  2. Last night no problems.  Hemoptysis was dark blood, not bright red states patient today.    3. No other problems voiced.    4. CXR and pft's today.    Activity:     Oxygen needs:     Pain management:     Diabetic management:     Pt Education:     Health Maintenance:     Labs, CXR, PFTs reviewed with patient  Medication record reviewed and reconciled  Questions and concerns addressed    Patient Instructions  1. We will monitor bronch results.  2. US doppler left upper extremity today.  3. Xopenex inhalers as needed.  4. Labs and Chest xray reviewed and are stable.  5. Your tremors will get better as time passes from your transplant.    Next transplant clinic appointment:  2 weeks with CXR, labs and PFTs.  Get a pft next week to monitor FEV1.    Next lab draw: next week.      AVS printed at  time of check out

## 2018-04-16 NOTE — PROGRESS NOTES
I was called by the ED this am to discuss the history, assessment and plan for Kecia.  I evaluated Kecia as well.  Early this am she awoke an had epilmsode of hemoptysis--bright red blood.  She brought the Kleenex that she soaked with the blood.  She came in immediately for evaluation.  Kecia under went bronch this past week, so was taken off her warfarin and bridge with lovenox.  She was taking both at the time of event.  She denied other pulmonary concerns.  She had no F/C/S.  Her energy is good.  She has no gi or CV concerns.  Her vitals and CXR were stable.  She had rhonchi in the right base.    The plan that was discussed with Kecia, her  and ED physicians was to hold the lovenox and continue warfarin.  Kecia was to return to the ED with any further bleeding.  She has a clinic appointment Monday April 16th that she should keep.    Angelica Figueroa MD MPH   of Medicine

## 2018-04-16 NOTE — MR AVS SNAPSHOT
Kecia Blue   4/16/2018   Anticoagulation Therapy Visit    Description:  57 year old female   Provider:  Judith Salazar, RN   Department:  [facility]           INR as of 4/16/2018     Today's INR 1.30!      Anticoagulation Summary as of 4/16/2018     INR goal 2.0-3.0   Today's INR 1.30!   Full instructions 4/16: 6 mg; 4/17: 6 mg; Otherwise No maintenance plan   Next INR check 4/18/2018    Indications   Lung transplant recipient (H) [Z94.2]  Deep vein thrombosis (DVT) (H) [I82.409] [I82.409]         April 2018 Details    Sun Mon Tue Wed Thu Fri Sat     1               2               3               4               5               6               7                 8               9               10               11               12               13               14                 15               16      6 mg   See details      17      6 mg         18            19               20               21                 22               23               24               25               26               27               28                 29               30                     Date Details   04/16 This INR check       Date of next INR:  4/18/2018         How to take your warfarin dose     To take:  6 mg Take 6 of the 1 mg tablets.

## 2018-04-16 NOTE — MR AVS SNAPSHOT
After Visit Summary   4/16/2018    Kecia Blue    MRN: [id number]           Patient Information     Date Of Birth          1960        Visit Information        Provider Department      4/16/2018 8:00 AM Alex Hale MD [facility] Solid Organ Transplant        Today's Diagnoses     Lung transplant recipient (H)    -  1    Encounter for long-term (current) use of high-risk medication        Arm vein blood clot, left          Care Instructions    Patient Instructions  1. We will monitor bronch results.  2. US doppler left upper extremity today.  3. Xopenex inhalers as needed.  4. Labs and Chest xray reviewed and are stable.  5. Your tremors will get better as time passes from your transplant.    Next transplant clinic appointment:  2 weeks with CXR, labs and PFTs.  Get a pft next week to monitor FEV1.    Next lab draw: next week.        ~~~~~~~~~~~~~~~~~~~~~~~~~    Thoracic Transplant Office phone [phone], fax [phone]    Office Hours 8:30 - 5:00     For after-hours urgent issues, please dial [phone], and ask to speak with the Thoracic Transplant Coordinator  On-Call, pager [phone].  --------------------  To expedite your medication refill(s), please contact your pharmacy and have them fax a refill request to: [phone]  .   *Please allow 3 business days for routine medication refills.  *Please allow 5 business days for controlled substance medication refills.    **For Diabetic medications and supplies refill(s), please contact your pharmacy and have them  Contact your Endocrine team.  --------------------  For scheduling appointments call [facility] transplant :  [phone]. For lab appointments call [phone] or [facility].  --------------------  Please Note: If you are active on [employer], all future test results will be sent by [employer] message only, and will no longer be called to patient. You may also receive communication directly from your physician.             Follow-ups after your visit        Your next 10 appointments already scheduled     Apr 16, 2018 11:00 AM CDT   US VENOUS with UCUSV1   [facility] US ([facility] [place])    [address]  1st Floor  [facility] [address] [phone]           Please bring a list of your medicines (including vitamins, minerals and over-the-counter drugs). Also, tell your doctor about any allergies you may have. Wear comfortable clothes and leave your valuables at home.  You do not need to do anything special to prepare for your exam.  Please call the Imaging Department at your exam site with any questions.            Apr 18, 2018 10:00 AM CDT   Pulmonary Treatment with UR PULMONARY REHAB 2   [facility], Cardiac Rehabilitation ([facility])    [address] [place] [address] [address] [address]   [phone]            Apr 20, 2018 10:00 AM CDT   Pulmonary Treatment with UR PULMONARY REHAB 2   [facility] Rehabilitation ([facility])    [address] [place] [address]   [phone]            Apr 25, 2018 10:00 AM CDT   Pulmonary Treatment with UR PULMONARY REHAB 2   [facility] Rehabilitation ([facility])    [address] [place] [address]   [phone]            Apr 27, 2018 10:00 AM CDT   Pulmonary Treatment with UR PULMONARY REHAB 2   [facility], [facility] Rehabilitation ([facility])    [address] [place] [address]   [phone]            Apr 30, 2018  7:30 AM CDT   Lab with  LAB    [facility] ([facility])    [address]  1st Floor  [facility] [address]    [phone]            Apr 30, 2018  8:00 AM CDT   (Arrive by 7:45 AM)   XR CHEST 2 VIEWS with UCXR1   [facility] Xray ([facility])    [address]  [address]st [facility] [address] [phone]           Please bring a list of your current medicines to your exam. (Include vitamins, minerals and over-thecounter medicines.) Leave your valuables at home.  Tell your doctor if there is a chance you may be pregnant.  You do not need to do anything special for this exam.            Apr 30, 2018  8:30 AM CDT   PFT VISIT with  PFL D   [facility] Pulmonary Function Testing ([facility])    [address]  [address]rd [facility] [address] [phone]            Apr 30, 2018  9:00 AM CDT   (Arrive by 8:45 AM)   Return Lung Transplant with Dean Riley MD   [facility] Solid Organ Transplant ([facility])    [address] [place] [id number] [phone]            May 02, 2018 10:00 AM CDT   Pulmonary Treatment with  PULMONARY REHAB 2   [facility], Cardiac Rehabilitation ([facility])    [address] 1st [address]  [facility] [address] [phone]              Future tests that were ordered for you today     Open Future Orders        Priority Expected Expires Ordered    RESPIRATORY FLOW VOLUME LOOP Routine 4/23/2018 4/27/2018 4/16/2018    US Upper Extremity Venous Duplex Left Routine 4/16/2018 4/20/2018 4/16/2018    Basic metabolic panel Routine  5/11/2018 4/16/2018    Magnesium Routine  5/11/2018 4/16/2018    CBC with platelets Routine  5/11/2018 4/16/2018    CMV DNA quantification Routine  5/11/2018 4/16/2018    X-ray Chest 2 vws* Routine 4/16/2018 5/11/2018 4/16/2018    Spirometry, Breathing Capacity Routine  5/11/2018 4/16/2018    INR Routine  5/11/2018 4/16/2018    Tacrolimus level Routine  5/11/2018  "4/16/2018            Who to contact     If you have questions or need follow up information about today's clinic visit or your schedule please contact [facility] directly at [phone].  Normal or non-critical lab and imaging results will be communicated to you by MyChart, letter or phone within 4 business days after the clinic has received the results. If you do not hear from us within 7 days, please contact the clinic through MyChart or phone. If you have a critical or abnormal lab result, we will notify you by phone as soon as possible.  Submit refill requests through [employer] or call your pharmacy and they will forward the refill request to us. Please allow 3 business days for your refill to be completed.          Additional Information About Your Visit        Teacher[employer] Information     [employer] gives you secure access to your electronic health record. If you see a primary care provider, you can also send messages to your care team and make appointments. If you have questions, please call your primary care clinic.  If you do not have a primary care provider, please call [phone] and they will assist you.        Care EveryWhere ID     This is your Care EveryWhere ID. This could be used by other organizations to access your [facility] medical records  [id number]        Your Vitals Were     Pulse Temperature Respirations Height Last Period Pulse Oximetry    58 98.5  F (36.9  C) (Oral) 18 1.626 m (5' 4\") 06/07/2014 (Exact Date) 94%    BMI (Body Mass Index)                   19.91 kg/m2            Blood Pressure from Last 3 Encounters:   04/16/18 123/86   04/15/18 (!) 149/91   04/10/18 124/86    Weight from Last 3 Encounters:   04/16/18 52.6 kg (116 lb)   04/15/18 53.5 kg (118 lb)   04/13/18 55.3 kg (122 lb)               Primary Care Provider Office Phone # Fax #    Rosy Vu [phone]       [facility]  [address]  [place] [address]        Equal " Access to Services     [facility]: Hadii aad ku hadryleyvadim Adanali, wadarwinda luqpankaj, qamaxta karosmorro beauchamp. So [facility] [place] [phone].    ATENCIÓN: Si kylela jordana, tiene a taylor disposición servicios gratuitos de asistencia lingüística. Llame al [phone].    We comply with applicable federal civil rights laws and [place] laws. We do not discriminate on the basis of race, color, national origin, age, disability, sex, sexual orientation, or gender identity.            Thank you!     Thank you for choosing [facility]  for your care. Our goal is always to provide you with excellent care. Hearing back from our patients is one way we can continue to improve our services. Please take a few minutes to complete the written survey that you may receive in the mail after your visit with us. Thank you!             Your Updated Medication List - Protect others around you: Learn how to safely use, store and throw away your medicines at www.disposemymeds.org.          This list is accurate as of 4/16/18  9:29 AM.  Always use your most recent med list.                   Brand Name Dispense Instructions for use Diagnosis    calcium-vitamin D 600-400 MG-UNIT per tablet    CALTRATE    60 tablet    Take 1 tablet by mouth 2 times daily (with meals)    S/P lung transplant (H)       enoxaparin 60 MG/0.6ML injection    LOVENOX    10 Syringe    Inject 60 mg subcutaneously every 12 hours as directed by Anticoagulation Clinic    Acute deep vein thrombosis (DVT) of left upper extremity, unspecified vein (H), Lung transplant recipient (H)       levalbuterol 45 MCG/ACT Inhaler    XOPENEX HFA    1 Inhaler    Inhale 2 puffs into the lungs every 6 hours as needed for shortness of breath / dyspnea or wheezing    S/P lung transplant (H), Other constipation       magnesium oxide 400 MG tablet    MAG-OX    30 tablet    Take 1 tablet (400 mg) by mouth daily    Hypomagnesemia        metoprolol tartrate 25 MG tablet    LOPRESSOR    60 tablet    Take 1 tablet (25 mg) by mouth 2 times daily    Paroxysmal atrial fibrillation (H)       multivitamin, therapeutic with minerals Tabs tablet     30 each    Take 1 tablet by mouth daily    Lung transplant recipient (H)       mycophenolate 250 MG capsule    GENERIC EQUIVALENT    360 capsule    Take 6 capsules (1,500 mg) by mouth 2 times daily    S/P lung transplant (H)       nystatin 522609 UNIT/ML suspension    MYCOSTATIN    1200 mL    Take 10 mLs (1,000,000 Units) by mouth 4 times daily    Lung transplant recipient (H)       order for DME     1 Device    Equipment being ordered: Oxygen 5 liters at rest, 15 liters with exertion.  Needs portability.  Please provide 2 10-liter concentrators for in the home    ILD (interstitial lung disease) (H), Hypoxia       pantoprazole 40 MG EC tablet    PROTONIX    30 tablet    Take 1 tablet (40 mg) by mouth every morning    Gastroesophageal reflux disease without esophagitis       polyethylene glycol Packet    MIRALAX/GLYCOLAX    60 packet    Take 17 g by mouth 2 times daily as needed for constipation    Other constipation       predniSONE 5 MG tablet    DELTASONE    300 tablet    DateAM (mg)PM (mg) 3/22/1812.510 4/26/181010 5/24/18107.5 6/22/187.57.5 7/20/187.55 8/24/1855 9/21/1852.5    S/P lung transplant (H)       senna-docusate 8.6-50 MG per tablet    SENOKOT-S;PERICOLACE    100 tablet    Take 2 tablets by mouth 2 times daily as needed for constipation    Other constipation, S/P lung transplant (H)       sulfamethoxazole-trimethoprim 400-80 MG per tablet    BACTRIM/SEPTRA    30 tablet    1 tablet by Oral or NG Tube route daily    Organ transplant candidate, Lung disease, interstitial (H), Abnormal chest CT, Hypotension, unspecified hypotension type, Acute on chronic respiratory failure with hypoxia (H), ILD (interstitial lung disease) (H)       tacrolimus 1 MG capsule    GENERIC EQUIVALENT    370 capsule    Take 5  mg in the AM and 4 mg in the PM    S/P lung transplant (H), Other constipation       valGANciclovir 450 MG tablet    VALCYTE    60 tablet    Take 2 tablets (900 mg) by mouth daily    S/P lung transplant (H)       warfarin 1 MG tablet    COUMADIN    100 tablet    2-4mg daily, adjusted by coumadin clinic    Lung transplant recipient (H), Paroxysmal atrial fibrillation (H)

## 2018-04-16 NOTE — LETTER
4/16/2018      RE: Kecia Blue  [address] [place]  [facility] [address]             Pulmonary Medicine  Post- Lung Transplant Clinic Note   April 16, 2018       Patient: Kecia Blue  MRN: [id number]  Transplant Date: 2/21/2018 (POD# 46)           Assessment and Plan:   Mrs. Kecia Blue is a 58 y/o female with h/o dermatomyositis, seronegative RA, ILD and pulmonary hypertension who was admitted for emergent lung transplant workup on 2/21. [facility] and subsequently received a bilateral lung transplant on 3/1/18 (POD# 81). Post operative course complicated by right-sided pleural effusion s/p thoracentesis. She is seen today for routine follow up.     Problem List:  1. S/p lung transplant patient with pulmonary complaints, cxr without acute pulmonary process and spirometry is improving   - continue with 3-drug immunosuppression and transplant prophylaxis   - will f/u tac level and dose adjust accordingly (goal 10 -15)   - encourage daily physical activity    RTC in 1 week for ROS, history and physical examination, labs including spirometry and cxray        Subjective & Interval History:   Patient presents today for routine clinic visit. Her  was also in attendance. She had an initial question of which medication is causing her shakiness. We talked about the tacrolimus and deconditioning. She has no respiratory complaints in particular no cough. She does has chest pain with movement which is improving and responds to aspirin and tylenol. She denies diarrhea, constipation, black or tarry stools. She states she is increasing her activity level but is limited due to the snow. She also reports/gives a heads up that her medications come from [place]. She reports that she was recently seen in the emergency for one episode of hemoptysis. Of note she had a transbronchial biopsy last week and is on coumadin for DVT/afib. W/u in ER was unremarkable and  clear chest xray.   She does complain of PND.       Family and social histories were reviewed with patient. There are no significant changes  Family History   Problem Relation Age of Onset     Hypertension Mother      Arthritis Mother      Pancreatic Cancer Father      DIABETES Father      Social History   Substance Use Topics     Smoking status: Never Smoker     Smokeless tobacco: Never Used     Alcohol use No            Review of Systems:   Please see HPI,  ROS: 10 point ROS neg other than the symptoms noted above in the HPI.            Medications:     Current Outpatient Prescriptions   Medication     tacrolimus (GENERIC EQUIVALENT) 1 MG capsule     valGANciclovir (VALCYTE) 450 MG tablet     mycophenolate (GENERIC EQUIVALENT) 250 MG capsule     nystatin (MYCOSTATIN) 907653 UNIT/ML suspension     polyethylene glycol (MIRALAX/GLYCOLAX) Packet     warfarin (COUMADIN) 1 MG tablet     levalbuterol (XOPENEX HFA) 45 MCG/ACT Inhaler     senna-docusate (SENOKOT-S;PERICOLACE) 8.6-50 MG per tablet     calcium-vitamin D (CALTRATE) 600-400 MG-UNIT per tablet     magnesium oxide (MAG-OX) 400 MG tablet     metoprolol tartrate (LOPRESSOR) 25 MG tablet     pantoprazole (PROTONIX) 40 MG EC tablet     sulfamethoxazole-trimethoprim (BACTRIM/SEPTRA) 400-80 MG per tablet     multivitamin, therapeutic with minerals (THERA-VIT-M) TABS tablet     predniSONE (DELTASONE) 5 MG tablet     enoxaparin (LOVENOX) 60 MG/0.6ML injection     order for DME     No current facility-administered medications for this visit.             Physical Exam:     PHYSICAL EXAMINATION:   GEN: Awake and alert, in no acute distress, sitting upright in chair. Pleasant and cooperative  HEENT: Pupils equal and reactive to light, conjunctiva are pink conjunctivae, sclera anicteric,  Oral mucosa moist without lesions.  NECK: supple no JVD/LAD  PULM: Good air flow bilaterally, symmetric in expansion, Scattered crackles to lower lobes. No rhonchi, no wheezes. Breathing  "non-labored.   CV: Normal S1 and S2. RRR.  No murmur, gallop, or rub.  No peripheral edema.  Peripheral pulses intact.   ABD: Soft, nontender, nondistended. Bowel sound normoactive, no guarding, no rebound.   MSK: .  No apparent muscle wasting. No clubbing, cyanosis, or edema  NEURO: Alert and oriented to person, place, and time. , motor 5/5 upper/lower extremity b/l, sensation grossly intact to touch, gait normal  SKIN: Warm and dry. No visible rashes or lesions   PSYCH: Mood stable, judgment and insight appropriate.           Data:     All vital signs, laboratory and imaging data for the past 24 hours reviewed.      Vital signs:  Temp: 98.5  F (36.9  C) Temp src: Oral BP: 123/86 Pulse: 58   Resp: 18 SpO2: 94 % (RA)     Height: 162.6 cm (5' 4\") Weight: 52.6 kg (116 lb)    Weight trend:   Vitals:    04/16/18 0806   Weight: 52.6 kg (116 lb)        Labs    CXR 4/16/18 report and film personally reviewed by me  1. Previously described loculated left pleural effusion has decreased  in size. Trace right pleural effusion.  2. No acute consolidation.  3. Mildly dilated loops of bowel in the midabdomen    PFT interpretation 4/16/18: Maneuver: valid and meets ATS guidelines  1. FVC 1.4 (43% pred)  2 FEV1 1.09 (42% pred)  3. Normal ratio with decreased FEV1 and FVC  The reduced FEV1  is consistent with severe obstruction. The decrease in FVC may represent restrictive physiology.  Lung volumes would be necessary to determine.   Compared to prior: Her spirometry has increased in comparison to her most recent spirometry. Today's FEV1 is her post-transplant best    Patient was seen and examined by me. I personally reviewed the electronic medical record including labs, flowsheets, imaging reports and films, vitals, and medications.  Clinical update provided to the patient and her , I answered all of their questions and provided them support    Alex Hale MD, [facility]   of Medicine  Pulmonary, " Critical Care and Sleep Medicine  [facility]  Pager: [phone]

## 2018-04-16 NOTE — NURSING NOTE
1. Newly visualized thrombus within the left internal jugular vein.  This region was obscured by overlying bandages on the prior study. It  is unclear if this is new or chronic thrombus.  Per report.    Patient currently on lovenox-bridging to coumadin.  Per Dr Hale, continue current plan above and recheck duplex US in one week to determine if clot is acute or chronic.  Scan ordered.    Tacrolimus level is 9.1 at 11.5 hours and is within goal range.  No dose changes.

## 2018-04-16 NOTE — PATIENT INSTRUCTIONS
Patient Instructions  1. We will monitor bronch results.  2. US doppler left upper extremity today.  3. Xopenex inhalers as needed.  4. Labs and Chest xray reviewed and are stable.  5. Your tremors will get better as time passes from your transplant.    Next transplant clinic appointment:  2 weeks with CXR, labs and PFTs.  Get a pft next week to monitor FEV1.    Next lab draw: next week.        ~~~~~~~~~~~~~~~~~~~~~~~~~    Thoracic Transplant Office phone [phone], fax [phone]    Office Hours 8:30 - 5:00     For after-hours urgent issues, please dial [phone], and ask to speak with the Thoracic Transplant Coordinator  On-Call, pager [phone].  --------------------  To expedite your medication refill(s), please contact your pharmacy and have them fax a refill request to: [phone]  .   *Please allow 3 business days for routine medication refills.  *Please allow 5 business days for controlled substance medication refills.    **For Diabetic medications and supplies refill(s), please contact your pharmacy and have them  Contact your Endocrine team.  --------------------  For scheduling appointments call [facility] transplant :  [phone]. For lab appointments call [phone] or [facility].  --------------------  Please Note: If you are active on [employer], all future test results will be sent by [employer] message only, and will no longer be called to patient. You may also receive communication directly from your physician.

## 2018-04-18 ENCOUNTER — HOSPITAL ENCOUNTER (OUTPATIENT)
Dept: CARDIAC REHAB | Facility: CLINIC | Age: 58
End: 2018-04-18
Attending: PHYSICAL MEDICINE & REHABILITATION
Payer: COMMERCIAL

## 2018-04-18 ENCOUNTER — ANTICOAGULATION THERAPY VISIT (OUTPATIENT)
Dept: ANTICOAGULATION | Facility: CLINIC | Age: 58
End: 2018-04-18

## 2018-04-18 VITALS — BODY MASS INDEX: 20.6 KG/M2 | WEIGHT: 120 LBS

## 2018-04-18 DIAGNOSIS — I82.622 ACUTE DEEP VEIN THROMBOSIS (DVT) OF LEFT UPPER EXTREMITY, UNSPECIFIED VEIN (H): ICD-10-CM

## 2018-04-18 DIAGNOSIS — I82.602 ARM VEIN BLOOD CLOT, LEFT: ICD-10-CM

## 2018-04-18 DIAGNOSIS — Z79.899 ENCOUNTER FOR LONG-TERM (CURRENT) USE OF HIGH-RISK MEDICATION: ICD-10-CM

## 2018-04-18 DIAGNOSIS — Z94.2 LUNG TRANSPLANT RECIPIENT (H): ICD-10-CM

## 2018-04-18 LAB
ANION GAP SERPL CALCULATED.3IONS-SCNC: 6 MMOL/L (ref 3–14)
BUN SERPL-MCNC: 26 MG/DL (ref 7–30)
CALCIUM SERPL-MCNC: 9.1 MG/DL (ref 8.5–10.1)
CHLORIDE SERPL-SCNC: 101 MMOL/L (ref 94–109)
CMV DNA SPEC NAA+PROBE-ACNC: NORMAL [IU]/ML
CMV DNA SPEC NAA+PROBE-LOG#: NORMAL {LOG_IU}/ML
CO2 SERPL-SCNC: 28 MMOL/L (ref 20–32)
CREAT SERPL-MCNC: 0.91 MG/DL (ref 0.52–1.04)
ERYTHROCYTE [DISTWIDTH] IN BLOOD BY AUTOMATED COUNT: 18.1 % (ref 10–15)
GFR SERPL CREATININE-BSD FRML MDRD: 64 ML/MIN/1.7M2
GLUCOSE SERPL-MCNC: 97 MG/DL (ref 70–99)
HCT VFR BLD AUTO: 30.4 % (ref 35–47)
HGB BLD-MCNC: 9.4 G/DL (ref 11.7–15.7)
INR PPP: 1.92 (ref 0.86–1.14)
MAGNESIUM SERPL-MCNC: 1.7 MG/DL (ref 1.6–2.3)
MCH RBC QN AUTO: 31.6 PG (ref 26.5–33)
MCHC RBC AUTO-ENTMCNC: 30.9 G/DL (ref 31.5–36.5)
MCV RBC AUTO: 102 FL (ref 78–100)
PLATELET # BLD AUTO: 401 10E9/L (ref 150–450)
POTASSIUM SERPL-SCNC: 5.2 MMOL/L (ref 3.4–5.3)
RBC # BLD AUTO: 2.97 10E12/L (ref 3.8–5.2)
SODIUM SERPL-SCNC: 135 MMOL/L (ref 133–144)
SPECIMEN SOURCE: NORMAL
TACROLIMUS BLD-MCNC: 8.6 UG/L (ref 5–15)
TME LAST DOSE: 2000 H
WBC # BLD AUTO: 9.1 10E9/L (ref 4–11)

## 2018-04-18 PROCEDURE — 40000244 ZZH STATISTIC VISIT PULM REHAB

## 2018-04-18 PROCEDURE — G0239 OTH RESP PROC, GROUP: HCPCS

## 2018-04-18 NOTE — TELEPHONE ENCOUNTER
PA Initiation    Medication: Tacrolimus 1 mg   Insurance Company: [employer] - Phone [phone] Fax [phone]  Pharmacy Filling the Rx:    Filling Pharmacy Phone:    Filling Pharmacy Fax:    Start Date: 4/11/2018    [facility] Authorization Team   Phone: [phone]  Fax: [phone]

## 2018-04-18 NOTE — PROGRESS NOTES
ANTICOAGULATION FOLLOW-UP CLINIC VISIT    Patient Name:  Kecia Blue  Date:  4/18/2018  Contact Type:  Telephone    SUBJECTIVE:     Patient Findings     Comments Kecia had some dark colored hemoptysis on 4/14/18 and was seen in the ER, lovenox was held that day.  She started back on lovenox 4/15/18           OBJECTIVE    INR   Date Value Ref Range Status   04/18/2018 1.92 (H) 0.86 - 1.14 Final       ASSESSMENT / PLAN  INR assessment THER    Recheck INR In: 2 DAYS    INR Location Clinic      Anticoagulation Summary as of 4/18/2018     INR goal 2.0-3.0   Today's INR 1.92!   Maintenance plan No maintenance plan   Full instructions 4/18: 6 mg; 4/19: 4 mg; Otherwise No maintenance plan   Next INR check 4/20/2018   Priority INR   Target end date     Indications   Lung transplant recipient (H) [Z94.2]  Deep vein thrombosis (DVT) (H) [I82.409] [I82.409]         Anticoagulation Episode Summary     INR check location Clinic Lab    Preferred lab     Send INR reminders to [facility] CLINIC    Comments Plan for 3 months of therapy- Provoked DVT. Welcome package sent . Patient staying at [facility] HIPPA OK and  Verbal OK to speak with  and leave messages on Cell [phone]      Anticoagulation Care Providers     Provider Role Specialty Phone number    Ame Chow PA-C Responsible Pulmonary [phone]            See the Encounter Report to view Anticoagulation Flowsheet and Dosing Calendar (Go to Encounters tab in chart review, and find the Anticoagulation Therapy Visit)    Spoke with Armani.  Paged Dr. Alex Hale to see if Kecia can stop lovenox today--per Dr. Hale, she cannot stop lovenox until she has 2 therapeutic INRs.  Called Kecia back and informed her of this.     Ranjan is requesting a prescription be sent to [facility] for lovenox--this is the only way their insurance will pay for them.  New RX sent.       Anju Meyers RN

## 2018-04-19 ENCOUNTER — TELEPHONE (OUTPATIENT)
Dept: RHEUMATOLOGY | Facility: CLINIC | Age: 58
End: 2018-04-19

## 2018-04-19 ENCOUNTER — ANTICOAGULATION THERAPY VISIT (OUTPATIENT)
Dept: ANTICOAGULATION | Facility: CLINIC | Age: 58
End: 2018-04-19

## 2018-04-19 DIAGNOSIS — Z94.2 LUNG TRANSPLANT RECIPIENT (H): ICD-10-CM

## 2018-04-19 DIAGNOSIS — Z79.899 ENCOUNTER FOR LONG-TERM (CURRENT) USE OF HIGH-RISK MEDICATION: ICD-10-CM

## 2018-04-19 DIAGNOSIS — I82.622 ACUTE DEEP VEIN THROMBOSIS (DVT) OF LEFT UPPER EXTREMITY, UNSPECIFIED VEIN (H): ICD-10-CM

## 2018-04-19 DIAGNOSIS — I82.602 ARM VEIN BLOOD CLOT, LEFT: ICD-10-CM

## 2018-04-19 LAB
ANION GAP SERPL CALCULATED.3IONS-SCNC: 8 MMOL/L (ref 3–14)
BUN SERPL-MCNC: 26 MG/DL (ref 7–30)
CALCIUM SERPL-MCNC: 9 MG/DL (ref 8.5–10.1)
CHLORIDE SERPL-SCNC: 100 MMOL/L (ref 94–109)
CMV DNA SPEC NAA+PROBE-ACNC: ABNORMAL [IU]/ML
CMV DNA SPEC NAA+PROBE-LOG#: ABNORMAL {LOG_IU}/ML
CO2 SERPL-SCNC: 28 MMOL/L (ref 20–32)
CREAT SERPL-MCNC: 0.97 MG/DL (ref 0.52–1.04)
ERYTHROCYTE [DISTWIDTH] IN BLOOD BY AUTOMATED COUNT: 17.6 % (ref 10–15)
FUNGUS SPEC CULT: NORMAL
GFR SERPL CREATININE-BSD FRML MDRD: 60 ML/MIN/1.7M2
GLUCOSE SERPL-MCNC: 104 MG/DL (ref 70–99)
HCT VFR BLD AUTO: 28.3 % (ref 35–47)
HGB BLD-MCNC: 8.8 G/DL (ref 11.7–15.7)
INR PPP: 2.05 (ref 0.86–1.14)
MAGNESIUM SERPL-MCNC: 1.7 MG/DL (ref 1.6–2.3)
MCH RBC QN AUTO: 31.4 PG (ref 26.5–33)
MCHC RBC AUTO-ENTMCNC: 31.1 G/DL (ref 31.5–36.5)
MCV RBC AUTO: 101 FL (ref 78–100)
PLATELET # BLD AUTO: 384 10E9/L (ref 150–450)
POTASSIUM SERPL-SCNC: 4.9 MMOL/L (ref 3.4–5.3)
RBC # BLD AUTO: 2.8 10E12/L (ref 3.8–5.2)
SODIUM SERPL-SCNC: 136 MMOL/L (ref 133–144)
SPECIMEN SOURCE: ABNORMAL
SPECIMEN SOURCE: NORMAL
TACROLIMUS BLD-MCNC: 6.7 UG/L (ref 5–15)
TME LAST DOSE: NORMAL H
WBC # BLD AUTO: 8.6 10E9/L (ref 4–11)

## 2018-04-19 NOTE — PROGRESS NOTES
ANTICOAGULATION FOLLOW-UP CLINIC VISIT    Patient Name:  Kecia Blue  Date:  4/19/2018  Contact Type:  Telephone    SUBJECTIVE:     Patient Findings     Comments Kecia will continue with Lovenox today and recheck an INR tomorrow.  She reports that she has plenty of Lovenox to get her through. MD specifically wants 2 INR with goal range before patient is able to stop Lovenox.            OBJECTIVE    INR   Date Value Ref Range Status   04/19/2018 2.05 (H) 0.86 - 1.14 Final       ASSESSMENT / PLAN  No question data found.  Anticoagulation Summary as of 4/19/2018     INR goal 2.0-3.0   Today's INR 2.05   Maintenance plan No maintenance plan   Full instructions 4/19: 6 mg; Otherwise No maintenance plan   Next INR check 4/20/2018   Priority INR   Target end date     Indications   Lung transplant recipient (H) [Z94.2]  Deep vein thrombosis (DVT) (H) [I82.409] [I82.409]         Anticoagulation Episode Summary     INR check location Clinic Lab    Preferred lab     Send INR reminders to [facility] CLINIC    Comments Plan for 3 months of therapy- Provoked DVT. Welcome package sent . Patient staying at [facility] HIPPA OK and  Verbal OK to speak with  and leave messages on Cell [phone]      Anticoagulation Care Providers     Provider Role Specialty Phone number    Ame Chow PA-C Responsible Pulmonary [phone]            See the Encounter Report to view Anticoagulation Flowsheet and Dosing Calendar (Go to Encounters tab in chart review, and find the Anticoagulation Therapy Visit)    Spoke with Kecia.     Katie Bush RN

## 2018-04-19 NOTE — MR AVS SNAPSHOT
Kecia Blue   4/19/2018   Anticoagulation Therapy Visit    Description:  57 year old female   Provider:  Katie Bush, RN   Department:  [facility]           INR as of 4/19/2018     Today's INR 2.05      Anticoagulation Summary as of 4/19/2018     INR goal 2.0-3.0   Today's INR 2.05   Full instructions 4/19: 6 mg; Otherwise No maintenance plan   Next INR check 4/20/2018    Indications   Lung transplant recipient (H) [Z94.2]  Deep vein thrombosis (DVT) (H) [I82.409] [I82.409]         April 2018 Details    Sun Mon Tue Wed Thu Fri Sat     1               2               3               4               5               6               7                 8               9               10               11               12               13               14                 15               16               17               18               19      6 mg   See details      20 21                 22               23               24               25               26               27               28                 29               30                     Date Details   04/19 This INR check       Date of next INR:  4/20/2018         How to take your warfarin dose     To take:  6 mg Take 6 of the 1 mg tablets.

## 2018-04-19 NOTE — TELEPHONE ENCOUNTER
Spoke with Dr. Jolly who agreed to see this patient. Offered patient an appointment on 5/10/2018 at 3:30 pm, patient accepted. Task was sent to Clinic Coordinator to assist with scheduling. Patient denied any further questions at this time.   Jess Bonilla CMA  4/19/2018 4:49 PM

## 2018-04-19 NOTE — TELEPHONE ENCOUNTER
General Rheumatology Intake Form      What is the name of the provider that referred you to us? Ame Chow      Have you seen a Rheumatologist in the past?  Yes, Dr. Lr at [facility] If so, when was the last time that you saw them? 11/29/2017          If yes, is this a second opinion or transfer of care? Transfer of care Are you wanting to establish care here yes What is the reason that you do not want to go back to the previous Rheumatologist that you saw? Patient would like her care in one place.        Have you been diagnosed with Fibromyalgia? no       Who manages your care for this issue now? Dr. Lr       What is your most urgent concern at this time? Patient has recently had a lung transplant 3/1/2018 and is currently here in the [facility] for the next 3 months. Patient stated that her Dermatomyositis is currently under control, but has Raynaud's symptoms (see below) and would like to be seen for that.         Have you seen any specialist related to the reason you are coming here? yes       Where are we expecting records (labs, imaging or pathology) from? All records available in Epic and via My Chart.     Referral process explained to patient who verbalized understanding. All questions were answered to best of my ability and the patient's satisfaction. Chart is ready for review.   Jess Bonilla CMA  4/19/2018 4:16 PM

## 2018-04-19 NOTE — TELEPHONE ENCOUNTER
----- Message from Polo Garcia LPN sent at 4/13/2018  8:46 AM CDT -----  Thanks for the message Evelyn. I will pass this onto Jess our intake person.     Thanks!  ----- Message -----     From: Evelyn Wright, RN     Sent: 4/12/2018   4:32 PM       To: RAMYA Barkley!  Are you in charge of rheumatology intake? Kecia was just recently transplanted and Ame Chow placed a rheumatology referral a few weeks back(see below for the reason)  and then this week Dr. Hood wanted me to check in with you guys to see if you could see her. Patient said she hasn't heard from anyone yet.      Dermatomyositis: with likely econdary Raynauds (per patient not officially diagnosed). She has pronounced purple discoloration of the digits on her left hand and right index finger. Cold to the touch. Also notes this occurs in her toes. Was on Imuran, tacrolimus and prednisone prior to transplant. Not on a CCB. Does have a rheumatologist at home, but would like to touch base with one while in [place].   - Referral to Rheumatology today    Thanks :)   Evelyn

## 2018-04-20 ENCOUNTER — TELEPHONE (OUTPATIENT)
Dept: TRANSPLANT | Facility: CLINIC | Age: 58
End: 2018-04-20

## 2018-04-20 ENCOUNTER — HOSPITAL ENCOUNTER (OUTPATIENT)
Dept: CARDIAC REHAB | Facility: CLINIC | Age: 58
End: 2018-04-20
Attending: PHYSICAL MEDICINE & REHABILITATION
Payer: COMMERCIAL

## 2018-04-20 ENCOUNTER — ANTICOAGULATION THERAPY VISIT (OUTPATIENT)
Dept: ANTICOAGULATION | Facility: CLINIC | Age: 58
End: 2018-04-20

## 2018-04-20 VITALS — WEIGHT: 119 LBS | BODY MASS INDEX: 20.43 KG/M2

## 2018-04-20 DIAGNOSIS — Z94.2 LUNG TRANSPLANT RECIPIENT (H): ICD-10-CM

## 2018-04-20 DIAGNOSIS — K59.09 OTHER CONSTIPATION: ICD-10-CM

## 2018-04-20 DIAGNOSIS — I82.622 ACUTE DEEP VEIN THROMBOSIS (DVT) OF LEFT UPPER EXTREMITY, UNSPECIFIED VEIN (H): ICD-10-CM

## 2018-04-20 DIAGNOSIS — Z94.2 S/P LUNG TRANSPLANT (H): ICD-10-CM

## 2018-04-20 DIAGNOSIS — Z94.2 LUNG REPLACED BY TRANSPLANT (H): ICD-10-CM

## 2018-04-20 DIAGNOSIS — I82.409 DEEP VEIN THROMBOSIS (DVT) (H): ICD-10-CM

## 2018-04-20 LAB — INR PPP: 2.62 (ref 0.86–1.14)

## 2018-04-20 PROCEDURE — 40000244 ZZH STATISTIC VISIT PULM REHAB

## 2018-04-20 PROCEDURE — G0239 OTH RESP PROC, GROUP: HCPCS

## 2018-04-20 RX ORDER — TACROLIMUS 1 MG/1
5 CAPSULE ORAL 2 TIMES DAILY
Qty: 300 CAPSULE | Refills: 11 | Status: SHIPPED | OUTPATIENT
Start: 2018-04-20 | End: 2018-05-09

## 2018-04-20 NOTE — MR AVS SNAPSHOT
Kecia Blue   4/20/2018   Anticoagulation Therapy Visit    Description:  57 year old female   Provider:  Katie Bush, RN   Department:  [facility]           INR as of 4/20/2018     Today's INR 2.62      Anticoagulation Summary as of 4/20/2018     INR goal 2.0-3.0   Today's INR 2.62   Full instructions 4/20: 4 mg; 4/21: 4 mg; 4/22: 6 mg; Otherwise No maintenance plan   Next INR check 4/23/2018    Indications   Lung transplant recipient (H) [Z94.2]  Deep vein thrombosis (DVT) (H) [I82.409] [I82.409]         April 2018 Details    Sun Mon Tue Wed Thu Fri Sat     1               2               3               4               5               6               7                 8               9               10               11               12               13               14                 15               16               17               18               19               20      4 mg   See details      21      4 mg           22      6 mg         23            24               25               26               27               28                 29               30                     Date Details   04/20 This INR check       Date of next INR:  4/23/2018         How to take your warfarin dose     To take:  4 mg Take 4 of the 1 mg tablets.    To take:  6 mg Take 6 of the 1 mg tablets.

## 2018-04-20 NOTE — TELEPHONE ENCOUNTER
Tacrolimus level 6.7 at 11.5 hours, on 4/19  Goal 10-15.   Current dose 5 mg in AM, 4 mg in PM    Dose changed to  5 mg in AM, 5 mg in PM   Recheck level in 7 days    Dr. Gaitan reviewed UE ultrasound with patient and plan is to repeat on Monday    Discussed with patient

## 2018-04-20 NOTE — PROGRESS NOTES
ANTICOAGULATION FOLLOW-UP CLINIC VISIT    Patient Name:  Kecia Blue  Date:  4/20/2018  Contact Type:  Telephone    SUBJECTIVE:     Patient Findings     Comments Instructions were given to stop Lovenox.            OBJECTIVE    INR   Date Value Ref Range Status   04/20/2018 2.62 (H) 0.86 - 1.14 Final       ASSESSMENT / PLAN  No question data found.  Anticoagulation Summary as of 4/20/2018     INR goal 2.0-3.0   Today's INR 2.62   Maintenance plan No maintenance plan   Full instructions 4/20: 4 mg; 4/21: 4 mg; 4/22: 6 mg; Otherwise No maintenance plan   Next INR check 4/23/2018   Priority INR   Target end date     Indications   Lung transplant recipient (H) [Z94.2]  Deep vein thrombosis (DVT) (H) [I82.409] [I82.409]         Anticoagulation Episode Summary     INR check location Clinic Lab    Preferred lab     Send INR reminders to [facility]    Comments Plan for 3 months of therapy- Provoked DVT. Welcome package sent . Patient staying at [facility] HIPPA OK and  Verbal OK to speak with  and leave messages on Cell [phone]      Anticoagulation Care Providers     Provider Role Specialty Phone number    Ame Chow PA-C Responsible Pulmonary [phone]            See the Encounter Report to view Anticoagulation Flowsheet and Dosing Calendar (Go to Encounters tab in chart review, and find the Anticoagulation Therapy Visit)    Spoke with Chuy Bush RN

## 2018-04-23 ENCOUNTER — RADIANT APPOINTMENT (OUTPATIENT)
Dept: ULTRASOUND IMAGING | Facility: CLINIC | Age: 58
End: 2018-04-23
Attending: INTERNAL MEDICINE
Payer: COMMERCIAL

## 2018-04-23 ENCOUNTER — ANTICOAGULATION THERAPY VISIT (OUTPATIENT)
Dept: ANTICOAGULATION | Facility: CLINIC | Age: 58
End: 2018-04-23

## 2018-04-23 DIAGNOSIS — Z79.899 ENCOUNTER FOR LONG-TERM (CURRENT) USE OF HIGH-RISK MEDICATION: ICD-10-CM

## 2018-04-23 DIAGNOSIS — Z94.2 LUNG TRANSPLANT RECIPIENT (H): ICD-10-CM

## 2018-04-23 DIAGNOSIS — I82.602 ARM VEIN BLOOD CLOT, LEFT: ICD-10-CM

## 2018-04-23 DIAGNOSIS — I82.622 ACUTE DEEP VEIN THROMBOSIS (DVT) OF LEFT UPPER EXTREMITY, UNSPECIFIED VEIN (H): ICD-10-CM

## 2018-04-23 DIAGNOSIS — Z94.2 LUNG REPLACED BY TRANSPLANT (H): ICD-10-CM

## 2018-04-23 DIAGNOSIS — I74.9 THROMBOEMBOLISM (H): ICD-10-CM

## 2018-04-23 DIAGNOSIS — I82.409 DEEP VEIN THROMBOSIS (DVT) (H): ICD-10-CM

## 2018-04-23 LAB
ANION GAP SERPL CALCULATED.3IONS-SCNC: 10 MMOL/L (ref 3–14)
BUN SERPL-MCNC: 25 MG/DL (ref 7–30)
CALCIUM SERPL-MCNC: 9.1 MG/DL (ref 8.5–10.1)
CHLORIDE SERPL-SCNC: 100 MMOL/L (ref 94–109)
CO2 SERPL-SCNC: 26 MMOL/L (ref 20–32)
CREAT SERPL-MCNC: 1.05 MG/DL (ref 0.52–1.04)
ERYTHROCYTE [DISTWIDTH] IN BLOOD BY AUTOMATED COUNT: 17.3 % (ref 10–15)
GFR SERPL CREATININE-BSD FRML MDRD: 54 ML/MIN/1.7M2
GLUCOSE SERPL-MCNC: 152 MG/DL (ref 70–99)
HCT VFR BLD AUTO: 29.7 % (ref 35–47)
HGB BLD-MCNC: 9.4 G/DL (ref 11.7–15.7)
INR PPP: 3.03 (ref 0.86–1.14)
MCH RBC QN AUTO: 32.3 PG (ref 26.5–33)
MCHC RBC AUTO-ENTMCNC: 31.6 G/DL (ref 31.5–36.5)
MCV RBC AUTO: 102 FL (ref 78–100)
PLATELET # BLD AUTO: 407 10E9/L (ref 150–450)
POTASSIUM SERPL-SCNC: 4.9 MMOL/L (ref 3.4–5.3)
RBC # BLD AUTO: 2.91 10E12/L (ref 3.8–5.2)
SODIUM SERPL-SCNC: 136 MMOL/L (ref 133–144)
TACROLIMUS BLD-MCNC: 10 UG/L (ref 5–15)
TME LAST DOSE: NORMAL H
WBC # BLD AUTO: 7.6 10E9/L (ref 4–11)

## 2018-04-23 NOTE — PROGRESS NOTES
ANTICOAGULATION FOLLOW-UP CLINIC VISIT    Patient Name:  Kecia Blue  Date:  4/23/2018  Contact Type:  Telephone    SUBJECTIVE:     Patient Findings     Positives No Problem Findings           OBJECTIVE    INR   Date Value Ref Range Status   04/23/2018 3.03 (H) 0.86 - 1.14 Final       ASSESSMENT / PLAN  INR assessment THER    Recheck INR In: 3 DAYS    INR Location Clinic      Anticoagulation Summary as of 4/23/2018     INR goal 2.0-3.0   Today's INR 3.03!   Maintenance plan No maintenance plan   Full instructions 4/23: 4 mg; 4/24: 6 mg; 4/25: 4 mg; Otherwise No maintenance plan   Next INR check 4/26/2018   Priority INR   Target end date     Indications   Lung transplant recipient (H) [Z94.2]  Deep vein thrombosis (DVT) (H) [I82.409] [I82.409]         Anticoagulation Episode Summary     INR check location Clinic Lab    Preferred lab     Send INR reminders to [facility]    Comments Plan for 3 months of therapy- Provoked DVT. Welcome package sent . Patient staying at [facility]IPPA OK and  Verbal OK to speak with  and leave messages on Cell [phone]      Anticoagulation Care Providers     Provider Role Specialty Phone number    Ame Chow PA-C Responsible Pulmonary [phone]            See the Encounter Report to view Anticoagulation Flowsheet and Dosing Calendar (Go to Encounters tab in chart review, and find the Anticoagulation Therapy Visit)    Spoke with patient.     Rufina Hanna RN

## 2018-04-23 NOTE — MR AVS SNAPSHOT
Kecia Blue   4/23/2018   Anticoagulation Therapy Visit    Description:  57 year old female   Provider:  Rufina Hanna, RN   Department:  [facility]           INR as of 4/23/2018     Today's INR 3.03!      Anticoagulation Summary as of 4/23/2018     INR goal 2.0-3.0   Today's INR 3.03!   Full instructions 4/23: 4 mg; 4/24: 6 mg; 4/25: 4 mg; Otherwise No maintenance plan   Next INR check 4/26/2018    Indications   Lung transplant recipient (H) [Z94.2]  Deep vein thrombosis (DVT) (H) [I82.409] [I82.409]         April 2018 Details    Sun Mon Tue Wed Thu Fri Sat     1               2               3               4               5               6               7                 8               9               10               11               12               13               14                 15               16               17               18               19               20               21                 22               23      4 mg   See details      24      6 mg         25      4 mg         26            27               28                 29               30                     Date Details   04/23 This INR check       Date of next INR:  4/26/2018         How to take your warfarin dose     To take:  4 mg Take 4 of the 1 mg tablets.    To take:  6 mg Take 6 of the 1 mg tablets.

## 2018-04-24 DIAGNOSIS — I82.409 DVT (DEEP VENOUS THROMBOSIS) (H): Primary | ICD-10-CM

## 2018-04-24 DIAGNOSIS — Z94.2 STATUS POST LUNG TRANSPLANTATION (H): ICD-10-CM

## 2018-04-24 LAB
EXPTIME-PRE: 7.56 SEC
FEF2575-%PRED-PRE: 24 %
FEF2575-PRE: 0.61 L/SEC
FEF2575-PRED: 2.45 L/SEC
FEFMAX-%PRED-PRE: 42 %
FEFMAX-PRE: 2.73 L/SEC
FEFMAX-PRED: 6.45 L/SEC
FEV1-%PRED-PRE: 37 %
FEV1-PRE: 0.96 L
FEV1FEV6-PRE: 69 %
FEV1FEV6-PRED: 81 %
FEV1FVC-PRE: 67 %
FEV1FVC-PRED: 79 %
FIFMAX-PRE: 2.69 L/SEC
FVC-%PRED-PRE: 44 %
FVC-PRE: 1.45 L
FVC-PRED: 3.25 L

## 2018-04-24 RX ORDER — WARFARIN SODIUM 1 MG/1
TABLET ORAL
Qty: 140 TABLET | Refills: 2 | Status: SHIPPED | OUTPATIENT
Start: 2018-04-24 | End: 2018-05-21

## 2018-04-24 NOTE — PROGRESS NOTES
Armand Mcdonnell, your tacrolimus level on 4/23 was 10.0, goal 10-15, so you are within your goal range. No dose changes. Thank you, Evelyn

## 2018-04-25 ENCOUNTER — HOSPITAL ENCOUNTER (OUTPATIENT)
Dept: CARDIAC REHAB | Facility: CLINIC | Age: 58
End: 2018-04-25
Attending: PHYSICAL MEDICINE & REHABILITATION
Payer: COMMERCIAL

## 2018-04-25 VITALS — BODY MASS INDEX: 20.43 KG/M2 | WEIGHT: 119 LBS

## 2018-04-25 PROCEDURE — G0239 OTH RESP PROC, GROUP: HCPCS

## 2018-04-25 PROCEDURE — 40000244 ZZH STATISTIC VISIT PULM REHAB

## 2018-04-26 ENCOUNTER — ANTICOAGULATION THERAPY VISIT (OUTPATIENT)
Dept: ANTICOAGULATION | Facility: CLINIC | Age: 58
End: 2018-04-26

## 2018-04-26 DIAGNOSIS — Z94.2 LUNG REPLACED BY TRANSPLANT (H): ICD-10-CM

## 2018-04-26 DIAGNOSIS — I82.409 DEEP VEIN THROMBOSIS (DVT) (H): ICD-10-CM

## 2018-04-26 DIAGNOSIS — Z94.2 LUNG TRANSPLANT RECIPIENT (H): ICD-10-CM

## 2018-04-26 DIAGNOSIS — I82.622 ACUTE DEEP VEIN THROMBOSIS (DVT) OF LEFT UPPER EXTREMITY, UNSPECIFIED VEIN (H): ICD-10-CM

## 2018-04-26 LAB
ANION GAP SERPL CALCULATED.3IONS-SCNC: 9 MMOL/L (ref 3–14)
BUN SERPL-MCNC: 26 MG/DL (ref 7–30)
CALCIUM SERPL-MCNC: 8.9 MG/DL (ref 8.5–10.1)
CHLORIDE SERPL-SCNC: 100 MMOL/L (ref 94–109)
CO2 SERPL-SCNC: 26 MMOL/L (ref 20–32)
CREAT SERPL-MCNC: 0.99 MG/DL (ref 0.52–1.04)
ERYTHROCYTE [DISTWIDTH] IN BLOOD BY AUTOMATED COUNT: 16.9 % (ref 10–15)
GFR SERPL CREATININE-BSD FRML MDRD: 58 ML/MIN/1.7M2
GLUCOSE SERPL-MCNC: 113 MG/DL (ref 70–99)
HCT VFR BLD AUTO: 31.1 % (ref 35–47)
HGB BLD-MCNC: 9.7 G/DL (ref 11.7–15.7)
INR PPP: 4.16 (ref 0.86–1.14)
MCH RBC QN AUTO: 31.7 PG (ref 26.5–33)
MCHC RBC AUTO-ENTMCNC: 31.2 G/DL (ref 31.5–36.5)
MCV RBC AUTO: 102 FL (ref 78–100)
PLATELET # BLD AUTO: 468 10E9/L (ref 150–450)
POTASSIUM SERPL-SCNC: 4.8 MMOL/L (ref 3.4–5.3)
RBC # BLD AUTO: 3.06 10E12/L (ref 3.8–5.2)
SODIUM SERPL-SCNC: 135 MMOL/L (ref 133–144)
TACROLIMUS BLD-MCNC: 10 UG/L (ref 5–15)
TME LAST DOSE: NORMAL H
WBC # BLD AUTO: 7.9 10E9/L (ref 4–11)

## 2018-04-26 PROCEDURE — 85610 PROTHROMBIN TIME: CPT | Performed by: PHYSICIAN ASSISTANT

## 2018-04-26 NOTE — PROGRESS NOTES
Labs from 4/26 baseline.  Tacrolimus 10.0, goal 10-15, no dose changes at this time. discussed with patient.

## 2018-04-26 NOTE — MR AVS SNAPSHOT
Kecia Blue   4/26/2018   Anticoagulation Therapy Visit    Description:  57 year old female   Provider:  Katie Bush, RN   [facility]           INR as of 4/26/2018     Today's INR 4.16!      Anticoagulation Summary as of 4/26/2018     INR goal 2.0-3.0   Today's INR 4.16!   Full instructions 4/26: 2 mg; 4/27: 4 mg; 4/28: 4 mg; 4/29: 6 mg; Otherwise No maintenance plan   Next INR check 4/30/2018    Indications   Lung transplant recipient (H) [Z94.2]  Deep vein thrombosis (DVT) (H) [I82.409] [I82.409]         April 2018 Details    Sun Mon Tue Wed Thu Fri Sat     1               2               3               4               5               6               7                 8               9               10               11               12               13               14                 15               16               17               18               19               20               21                 22               23               24               25               26      2 mg   See details      27      4 mg         28      4 mg           29      6 mg         30                  Date Details   04/26 This INR check       Date of next INR:  4/30/2018         How to take your warfarin dose     To take:  2 mg Take 2 of the 1 mg tablets.    To take:  4 mg Take 4 of the 1 mg tablets.    To take:  6 mg Take 6 of the 1 mg tablets.

## 2018-04-27 ENCOUNTER — HOSPITAL ENCOUNTER (OUTPATIENT)
Dept: CARDIAC REHAB | Facility: CLINIC | Age: 58
End: 2018-04-27
Attending: PHYSICAL MEDICINE & REHABILITATION
Payer: COMMERCIAL

## 2018-04-27 LAB
MYCOBACTERIUM SPEC CULT: NORMAL
MYCOBACTERIUM SPEC CULT: NORMAL
SPECIMEN SOURCE: NORMAL

## 2018-04-27 PROCEDURE — 40000244 ZZH STATISTIC VISIT PULM REHAB

## 2018-04-27 PROCEDURE — G0239 OTH RESP PROC, GROUP: HCPCS

## 2018-04-27 ASSESSMENT — PULMONARY FUNCTION TESTS
FEV1 (%PREDICTED): 38
FEV1: 1.01
FEV1/FVC: 92
FVC_PERCENT_PREDICTED: 33
FVC: 1.1

## 2018-04-27 ASSESSMENT — 6 MINUTE WALK TEST (6MWT)
TOTAL DISTANCE WALKED: 167.64
GENDER SELECTION: FEMALE
MALE CALC: 548.99
FEMALE CALC: 566.24

## 2018-04-27 ASSESSMENT — DUKE ACTIVITY SCORE INDEX (DASI)
DASI METS SCORE: 6.45
VO2_PEAK: 22.59

## 2018-04-27 ASSESSMENT — ACTIVITIES OF DAILY LIVING (ADL): ADL_LIMITATIONS: STAIR CLIMBING

## 2018-04-30 ENCOUNTER — RADIANT APPOINTMENT (OUTPATIENT)
Dept: GENERAL RADIOLOGY | Facility: CLINIC | Age: 58
End: 2018-04-30
Payer: COMMERCIAL

## 2018-04-30 ENCOUNTER — ANTICOAGULATION THERAPY VISIT (OUTPATIENT)
Dept: ANTICOAGULATION | Facility: CLINIC | Age: 58
End: 2018-04-30

## 2018-04-30 ENCOUNTER — HOSPITAL ENCOUNTER (OUTPATIENT)
Facility: CLINIC | Age: 58
End: 2018-04-30
Attending: INTERNAL MEDICINE | Admitting: INTERNAL MEDICINE
Payer: COMMERCIAL

## 2018-04-30 ENCOUNTER — OFFICE VISIT (OUTPATIENT)
Dept: TRANSPLANT | Facility: CLINIC | Age: 58
End: 2018-04-30
Attending: INTERNAL MEDICINE
Payer: COMMERCIAL

## 2018-04-30 VITALS
TEMPERATURE: 98.1 F | WEIGHT: 121.9 LBS | HEIGHT: 64 IN | OXYGEN SATURATION: 98 % | SYSTOLIC BLOOD PRESSURE: 142 MMHG | HEART RATE: 97 BPM | DIASTOLIC BLOOD PRESSURE: 90 MMHG | BODY MASS INDEX: 20.81 KG/M2

## 2018-04-30 DIAGNOSIS — Z94.2 LUNG TRANSPLANT RECIPIENT (H): ICD-10-CM

## 2018-04-30 DIAGNOSIS — Z94.2 LUNG TRANSPLANT RECIPIENT (H): Primary | ICD-10-CM

## 2018-04-30 DIAGNOSIS — J84.9 ILD (INTERSTITIAL LUNG DISEASE) (H): ICD-10-CM

## 2018-04-30 DIAGNOSIS — Z94.2 S/P LUNG TRANSPLANT (H): ICD-10-CM

## 2018-04-30 DIAGNOSIS — J84.9 LUNG DISEASE, INTERSTITIAL (H): ICD-10-CM

## 2018-04-30 DIAGNOSIS — I82.602 ARM VEIN BLOOD CLOT, LEFT: ICD-10-CM

## 2018-04-30 DIAGNOSIS — Z76.82 ORGAN TRANSPLANT CANDIDATE: ICD-10-CM

## 2018-04-30 DIAGNOSIS — I82.622 ACUTE DEEP VEIN THROMBOSIS (DVT) OF LEFT UPPER EXTREMITY, UNSPECIFIED VEIN (H): ICD-10-CM

## 2018-04-30 DIAGNOSIS — Z79.899 ENCOUNTER FOR LONG-TERM (CURRENT) USE OF HIGH-RISK MEDICATION: ICD-10-CM

## 2018-04-30 DIAGNOSIS — J96.21 ACUTE ON CHRONIC RESPIRATORY FAILURE WITH HYPOXIA (H): ICD-10-CM

## 2018-04-30 DIAGNOSIS — Z94.2 LUNG REPLACED BY TRANSPLANT (H): ICD-10-CM

## 2018-04-30 DIAGNOSIS — I95.9 HYPOTENSION, UNSPECIFIED HYPOTENSION TYPE: ICD-10-CM

## 2018-04-30 DIAGNOSIS — R93.89 ABNORMAL CHEST CT: ICD-10-CM

## 2018-04-30 DIAGNOSIS — I82.409 DEEP VEIN THROMBOSIS (DVT) (H): ICD-10-CM

## 2018-04-30 LAB
ANION GAP SERPL CALCULATED.3IONS-SCNC: 10 MMOL/L (ref 3–14)
BUN SERPL-MCNC: 25 MG/DL (ref 7–30)
CALCIUM SERPL-MCNC: 9.2 MG/DL (ref 8.5–10.1)
CHLORIDE SERPL-SCNC: 101 MMOL/L (ref 94–109)
CO2 SERPL-SCNC: 26 MMOL/L (ref 20–32)
CREAT SERPL-MCNC: 1.05 MG/DL (ref 0.52–1.04)
ERYTHROCYTE [DISTWIDTH] IN BLOOD BY AUTOMATED COUNT: 16.2 % (ref 10–15)
EXPTIME-PRE: 8.39 SEC
FEF2575-%PRED-PRE: 36 %
FEF2575-PRE: 0.88 L/SEC
FEF2575-PRED: 2.45 L/SEC
FEFMAX-%PRED-PRE: 48 %
FEFMAX-PRE: 3.15 L/SEC
FEFMAX-PRED: 6.45 L/SEC
FEV1-%PRED-PRE: 44 %
FEV1-PRE: 1.14 L
FEV1FEV6-PRE: 76 %
FEV1FEV6-PRED: 81 %
FEV1FVC-PRE: 78 %
FEV1FVC-PRED: 79 %
FIFMAX-PRE: 2.71 L/SEC
FVC-%PRED-PRE: 44 %
FVC-PRE: 1.46 L
FVC-PRED: 3.25 L
GFR SERPL CREATININE-BSD FRML MDRD: 54 ML/MIN/1.7M2
GLUCOSE SERPL-MCNC: 100 MG/DL (ref 70–99)
HCT VFR BLD AUTO: 31.1 % (ref 35–47)
HGB BLD-MCNC: 9.7 G/DL (ref 11.7–15.7)
INR PPP: 3.21 (ref 0.86–1.14)
MCH RBC QN AUTO: 31.8 PG (ref 26.5–33)
MCHC RBC AUTO-ENTMCNC: 31.2 G/DL (ref 31.5–36.5)
MCV RBC AUTO: 102 FL (ref 78–100)
PLATELET # BLD AUTO: 462 10E9/L (ref 150–450)
POTASSIUM SERPL-SCNC: 4.9 MMOL/L (ref 3.4–5.3)
RBC # BLD AUTO: 3.05 10E12/L (ref 3.8–5.2)
SODIUM SERPL-SCNC: 136 MMOL/L (ref 133–144)
TACROLIMUS BLD-MCNC: 11.4 UG/L (ref 5–15)
TME LAST DOSE: NORMAL H
WBC # BLD AUTO: 8.1 10E9/L (ref 4–11)

## 2018-04-30 PROCEDURE — 85027 COMPLETE CBC AUTOMATED: CPT | Performed by: PHYSICIAN ASSISTANT

## 2018-04-30 PROCEDURE — 85610 PROTHROMBIN TIME: CPT | Performed by: PHYSICIAN ASSISTANT

## 2018-04-30 PROCEDURE — 80197 ASSAY OF TACROLIMUS: CPT | Performed by: INTERNAL MEDICINE

## 2018-04-30 PROCEDURE — 36415 COLL VENOUS BLD VENIPUNCTURE: CPT | Performed by: PHYSICIAN ASSISTANT

## 2018-04-30 PROCEDURE — 80048 BASIC METABOLIC PNL TOTAL CA: CPT | Performed by: PHYSICIAN ASSISTANT

## 2018-04-30 PROCEDURE — G0463 HOSPITAL OUTPT CLINIC VISIT: HCPCS | Mod: ZF

## 2018-04-30 RX ORDER — MULTIPLE VITAMINS W/ MINERALS TAB 9MG-400MCG
1 TAB ORAL DAILY
Qty: 30 EACH | Refills: 11 | Status: SHIPPED | OUTPATIENT
Start: 2018-04-30 | End: 2018-05-21

## 2018-04-30 RX ORDER — PREDNISONE 5 MG/1
TABLET ORAL
Qty: 300 TABLET | Refills: 3 | Status: SHIPPED | OUTPATIENT
Start: 2018-04-30 | End: 2018-05-21

## 2018-04-30 RX ORDER — SULFAMETHOXAZOLE AND TRIMETHOPRIM 400; 80 MG/1; MG/1
1 TABLET ORAL DAILY
Qty: 30 TABLET | Refills: 11 | Status: SHIPPED | OUTPATIENT
Start: 2018-04-30 | End: 2018-05-21

## 2018-04-30 ASSESSMENT — PAIN SCALES - GENERAL: PAINLEVEL: MODERATE PAIN (5)

## 2018-04-30 NOTE — PATIENT INSTRUCTIONS
Patient Instructions  1. Call me if your chest pain worsens   2. Check blood pressure on both arms today  3. [facility] Fax number: [phone]    Next transplant clinic appointment: 1 weeks with CXR, labs and PFTs and bronchoscopy after  Next lab draw: 1 week    ~~~~~~~~~~~~~~~~~~~~~~~~~    Thoracic Transplant Office phone [phone], fax [phone]    Office Hours 8:30 - 5:00     For after-hours urgent issues, please dial [phone], and ask to speak with the Thoracic Transplant Coordinator  On-Call, pager [phone].  --------------------  To expedite your medication refill(s), please contact your pharmacy and have them fax a refill request to: [phone]  .   *Please allow 3 business days for routine medication refills.  *Please allow 5 business days for controlled substance medication refills.    **For Diabetic medications and supplies refill(s), please contact your pharmacy and have them  Contact your Endocrine team.  --------------------  For scheduling appointments call [facility] transplant :  [phone]. For lab appointments call [phone] or [facility].  --------------------  Please Note: If you are active on [employer], all future test results will be sent by [employer] message only, and will no longer be called to patient. You may also receive communication directly from your physician.

## 2018-04-30 NOTE — NURSING NOTE
Transplant Coordinator Note      Reason for visit: Post lung transplant follow up visit   Coordinator: Present   Caregiver:  present      Health concerns addressed today:  1. FEV1 slightly improved  2. Chest pain on right side of chest worsens when she coughs/sneezes  3. DVT--continue    Activity/rehab: pulmonary rehab starts on 4/6  Oxygen needs: RA  Pain management/RX: na  Diabetic management: na  Next Bronch due: , will need lovenox bridging, coumadin clinic aware   High risk donor:  Yes   CMV status: R+/D+  Valcyte stopped: at 3 month zachary, June 1st   DVT/PE: yes  AC/asa: coumadin  PJP prophylactic: bactrim  Other: she has raynauds's --fingers purple. referral placed for rheumatology to see her      Pt Education: medications (use/dose/side effects), how/when to call coordinator, frequency of labs, s/s of infection/rejection, call prior to starting any new medications, lab/vital sign book, prednisone taper  Health Maintenance:     Last colonoscopy:     Next colonoscopy due:     Dermatology: annually     Vaccinations this visit: na      Labs, CXR, PFTs reviewed with patient  Medication record reviewed and reconciled  Questions and concerns addressed    Patient Instructions  1. Call me if your chest pain worsens   2. Check blood pressure on both arms   3. Call and get a fax number for order to get rid of oxygen and then call me with it    Next transplant clinic appointment: 1 weeks with CXR, labs and PFTs and bronchoscopy after  Next lab draw: 1 week

## 2018-04-30 NOTE — LETTER
4/30/2018      RE: Kecia Blue  [address] [place]  [facility] [address]       [facility]  Pulmonary Medicine/Lung Transplant  April 30, 2018         Today's visit note:     PATIENT PROFILE:  Kecia Blue is a 57-year-old female with a history of dermatomyositis, seronegative rheumatoid arthritis, interstitial lung disease, and pulmonary hypertension who was admitted to the hospital with acute worsening of chronic hypoxic respiratory failure in 02/2018 and progressed to [facility] for right heart failure and subsequently underwent bilateral sequential lung transplant on 03/01/2018.  Her post-transplant course was generally uncomplicated; however, she needed pleural effusion tap on the right side later.      INTERVAL HISTORY:  The patient was last seen in our clinic approximately 2 weeks ago and comes in today for a followup.  She denies any shortness of breath at rest or with minimal exertion.  She does have restriction of her exertion due to dyspnea with moderate or heavy exertion.  She reports a cough which is productive of clear sputum, this has been stable since the time of her transplant.  She denies any wheezing or hemoptysis.      She does report that she is experiencing right anterior chest pain.  She had the pain since the time of her transplant, which was more diffuse but more recently it has improved at all other sites but has remained on the right anterior chest.  The pain is 3/10 in intensity when she is resting, but with any deep inspiration, cough or sneeze it increases significantly in intensity.  She takes Tylenol every 8 hours for the pain.  Occasionally, she has noted that the pain radiates to the back.  Overall she reports that the pain has been stable and not progressively increasing.  She denies any trauma to that part.      REVIEW OF SYSTEMS:     1.  She denies any recent fevers, chills or night sweats.  Her appetite is good.     2.  She denies any nasal  "congestion.  She reports occasional blurry vision, no difficulty with hearing.     3.  She denies any palpitations or lightheadedness.   4.  She denies nausea, vomiting, diarrhea, abdominal cramping, or constipation.     5.  She reports significant improvement in her pedal edema.  She has occasional upper extremity tremors.     6.  She reports that her sleep is good.  She has nocturia x2.     7.  She reports her mood is good.   8.  Rest of the review of systems is negative except as noted above.      PULMONARY FUNCTION TESTS:  Her FEV1 is 1.14 liters, which is 44% predicted.  FVC is 1.46 liters, which is 44% predicted, FEV1/FVC ratio 78%.  This test shows restrictive ventilatory defect; however, lung volumes are needed to confirm the diagnosis.  Overall, both FEV1 and FVC are stable to slightly improved over the last several weeks.      IMAGING:  Her chest x-ray from today shows bilaterally clear lung fields with no infiltrate.  She has slight bibasilar effusions.      PHYSICAL EXAMINATION:   /90  Pulse 97  Temp 98.1  F (36.7  C) (Oral)  Ht 1.626 m (5' 4\")  Wt 55.3 kg (121 lb 14.4 oz)  LMP 06/07/2014 (Exact Date)  SpO2 98%  BMI 20.92 kg/m2  GENERAL:  Pleasant female sitting comfortably, speaking in full sentences without any discomfort.   EYES:  Anicteric, no conjunctival pallor.   HEENT:  Mallampati class 3 airway with a large tongue, appears well hydrated.  No cervical or submandibular lymphadenopathy.   PULMONARY:  Normal intensity breath sounds bilaterally, some crackles in the right lower lobe.  No other added sounds.   CARDIOVASCULAR:  S1, S2 normal with no murmurs, rubs or gallops.  Regular rate and rhythm.   ABDOMEN:  Soft, nontender, nondistended, normoactive bowel sounds.   MUSCULOSKELETAL:  Trace lower extremity edema on the right.  No upper extremity tremors.   NEUROLOGIC:  Grossly intact.   PSYCHIATRIC:  Normal affect.   SKIN:  No rashes on limited exam.  The sternotomy incision has a small " area with a vesicular lesion.  No fluctuation or abscess noted.      ASSESSMENT AND PLAN:  The patient is a very pleasant 57-year-old female who is 2 months status post bilateral sequential lung transplant for ILD associated with rheumatoid arthritis.   1.  Bilateral lung transplant.  The patient's PFTs are stable to slightly improved, however these are slow to improve compared to what would be expected for her post transplant phase.  Chest imaging is unremarkable.  She remains on 3-drug immune suppression including tacrolimus with a goal of 10-15 nanograms/mL, mycophenolate 1500 mg twice a day and prednisone 10 mg twice a day.  These medications will continue and she will continue to taper her prednisone as per protocol.  Her last transbronchial biopsy was performed about 3 weeks ago which showed acute cellular rejection; however, some foci of organizing pneumonia were noted.  We will plan for repeat surveillance bronchoscopy in about 10 days from now.  The patient will be seen in clinic prior to that.  Her donor specific antibodies from earlier this month showed a low level DSA for DQB7 which was stable compared to previous tests.  We will check a panel of reactive antibodies the next time she is in clinic.   2.  Infectious disease:  The patient remains on Bactrim for PCP prophylaxis, nystatin for oral candidiasis prophylaxis, and valganciclovir for CMV prophylaxis.  The patient is intermediate risk for CMV reactivation.  She will need 3 months of prophylaxis.   3.  Hypomagnesemia:  The patient remains on magnesium replacement.  Her magnesium level today is acceptable.   4.  Provoked left upper extremity clot and right IJ clot.  The patient is on warfarin, which will be continued for 3 months from the time of initial clot discovery with a goal INR of 2-3.  Her anticoagulation will be managed around her transbronchial biopsy scheduled for some time next week.   5.  Dermatomyositis with Raynaud's phenomenon.  The  patient is awaiting to be seen by rheumatology.   6.  Hypertension.  This appears to be well treated with metoprolol 25 mg twice a day which will be continued.   7.  Right anterior chest wall pain.  There is no evidence of any significant pathology as a cause of the pain.  We checked her blood pressures in both arms and these were similar, thus making aortic pathology unlikely.  At this point, I do not see a reason to pursue any further workup; however, the patient was advised to call us if the pain worsens or does not resolve in the next 1-2 weeks, at which time I would probably start with a CT scan of her chest to evaluate this further.      The patient will be seen in clinic in approximately 1 week and will undergo surveillance bronchoscopy after that.          Data:     I reviewed all resulted lab tests and imaging studies.    Results for orders placed or performed in visit on 04/30/18   General PFT Lab (Please always keep checked)   Result Value Ref Range    FVC-Pred 3.25 L    FVC-Pre 1.46 L    FVC-%Pred-Pre 44 %    FEV1-Pre 1.14 L    FEV1-%Pred-Pre 44 %    FEV1FVC-Pred 79 %    FEV1FVC-Pre 78 %    FEFMax-Pred 6.45 L/sec    FEFMax-Pre 3.15 L/sec    FEFMax-%Pred-Pre 48 %    FEF2575-Pred 2.45 L/sec    FEF2575-Pre 0.88 L/sec    [id number]-%Pred-Pre 36 %    ExpTime-Pre 8.39 sec    FIFMax-Pre 2.71 L/sec    FEV1FEV6-Pred 81 %    FEV1FEV6-Pre 76 %              Medications:     Current Outpatient Prescriptions   Medication     calcium-vitamin D (CALTRATE) 600-400 MG-UNIT per tablet     levalbuterol (XOPENEX HFA) 45 MCG/ACT Inhaler     magnesium oxide (MAG-OX) 400 MG tablet     metoprolol tartrate (LOPRESSOR) 25 MG tablet     multivitamin, therapeutic with minerals (THERA-VIT-M) TABS tablet     mycophenolate (GENERIC EQUIVALENT) 250 MG capsule     nystatin (MYCOSTATIN) 505120 UNIT/ML suspension     pantoprazole (PROTONIX) 40 MG EC tablet     predniSONE (DELTASONE) 5 MG tablet     senna-docusate (SENOKOT-S;PERICOLACE) 8.6-50  MG per tablet     sulfamethoxazole-trimethoprim (BACTRIM/SEPTRA) 400-80 MG per tablet     tacrolimus (GENERIC EQUIVALENT) 1 MG capsule     valGANciclovir (VALCYTE) 450 MG tablet     warfarin (COUMADIN) 1 MG tablet     enoxaparin (LOVENOX) 60 MG/0.6ML injection     enoxaparin (LOVENOX) 60 MG/0.6ML injection     order for DME     polyethylene glycol (MIRALAX/GLYCOLAX) Packet     [DISCONTINUED] warfarin (COUMADIN) 1 MG tablet     No current facility-administered medications for this visit.             Past Medical and Surgical History:     Past Medical History:   Diagnosis Date     Antisynthetase syndrome (H) 2014     Chronic cough      Dermatomyositis (H)      Dysplasia of cervix, low grade (ESTRADA 1)      ILD (interstitial lung disease) (H)      Osteopenia      Pulmonary hypertension      Raynaud's disease      Seronegative rheumatoid arthritis (H)      Past Surgical History:   Procedure Laterality Date     BRONCHOSCOPY (RIGID OR FLEXIBLE), DIAGNOSTIC N/A 4/10/2018    Procedure: COMBINED BRONCHOSCOPY (RIGID OR FLEXIBLE), LAVAGE;;  Surgeon: Mariposa Donohue MD;  Location:  GI     BRONCHOSCOPY FLEXIBLE N/A 3/13/2018    Procedure: BRONCHOSCOPY FLEXIBLE;  Flexible Bronchoscopy ;  Surgeon: Gissell Sanchez MD;  Location:  GI     ENT SURGERY      tonsillectomy as a child     INSERT EXTRACORPORAL MEMBRANE OXYGENATOR ADULT (OUTSIDE OR) N/A 2/27/2018    Procedure: INSERT EXTRACORPORAL MEMBRANE OXYGENATOR ADULT (OUTSIDE OR);  INSERT EXTRACORPORAL MEMBRANE OXYGENATOR ADULT (OUTSIDE OR) ;  Surgeon: Toby Hernandez MD;  Location:  OR     no prior surgery       REMOVE EXTRACORPORAL MEMBRANE OXYGENATOR ADULT N/A 3/3/2018    Procedure: REMOVE EXTRACORPORAL MEMBRANE OXYGENATOR ADULT;  Removal of Right Femoral Venous and Right Axillary Arterial Extracorporeal Membrane Oxygenator;  Surgeon: Toby Hernandez MD;  [facility]: [facility]     TRANSPLANT LUNG RECIPIENT SINGLE X2 Bilateral 3/1/2018    Procedure:  TRANSPLANT LUNG RECIPIENT SINGLE X2;  Median Sternotomy, Extracorporeal Membrane Oxygenator, bilateral sequential lung transplant;  Surgeon: Toby Hernandez MD;  Location:  OR           Family History:     Family History   Problem Relation Age of Onset     Hypertension Mother      Arthritis Mother      Pancreatic Cancer Father      DIABETES Father             Social History:     Social History     Social History     Marital status:      Spouse name: N/A     Number of children: N/A     Years of education: N/A     Occupational History     Not on file.     Social History Main Topics     Smoking status: Never Smoker     Smokeless tobacco: Never Used     Alcohol use No     Drug use: No     Sexual activity: Not on file     Other Topics Concern     Parent/Sibling W/ Cabg, Mi Or Angioplasty Before 65f 55m? No     Social History Narrative       Dean Riley MD   of Medicine  Pulmonary, Critical Care and Sleep Medicine  [facility]  Pager: [phone]

## 2018-04-30 NOTE — LETTER
4/30/2018       RE: Kceia Blue  [address] [place] [address]     Dear Colleague,    Thank you for referring your patient, Kecia Blue, to the [facility] TRANSPL[facility] at [facility]. Please see a copy of my visit note below.    [facility]  Pulmonary Medicine/Lung Transplant  April 30, 2018         Today's visit note:     PATIENT PROFILE:  Kecia Blue is a 57-year-old female with a history of dermatomyositis, seronegative rheumatoid arthritis, interstitial lung disease, and pulmonary hypertension who was admitted to the hospital with acute worsening of chronic hypoxic respiratory failure in 02/2018 and progressed [facility] for right heart failure and subsequently underwent bilateral sequential lung transplant on 03/01/2018.  Her post-transplant course was generally uncomplicated; however, she needed pleural effusion tap on the right side later.      INTERVAL HISTORY:  The patient was last seen in our clinic approximately 2 weeks ago and comes in today for a followup.  She denies any shortness of breath at rest or with minimal exertion.  She does have restriction of her exertion due to dyspnea with moderate or heavy exertion.  She reports a cough which is productive of clear sputum, this has been stable since the time of her transplant.  She denies any wheezing or hemoptysis.      She does report that she is experiencing right anterior chest pain.  She had the pain since the time of her transplant, which was more diffuse but more recently it has improved at all other sites but has remained on the right anterior chest.  The pain is 3/10 in intensity when she is resting, but with any deep inspiration, cough or sneeze it increases significantly in intensity.  She takes Tylenol every 8 hours for the pain.  Occasionally, she has noted that the pain radiates to the back.  Overall she reports that the pain has been stable and  "not progressively increasing.  She denies any trauma to that part.      REVIEW OF SYSTEMS:     1.  She denies any recent fevers, chills or night sweats.  Her appetite is good.     2.  She denies any nasal congestion.  She reports occasional blurry vision, no difficulty with hearing.     3.  She denies any palpitations or lightheadedness.   4.  She denies nausea, vomiting, diarrhea, abdominal cramping, or constipation.     5.  She reports significant improvement in her pedal edema.  She has occasional upper extremity tremors.     6.  She reports that her sleep is good.  She has nocturia x2.     7.  She reports her mood is good.   8.  Rest of the review of systems is negative except as noted above.      PULMONARY FUNCTION TESTS:  Her FEV1 is 1.14 liters, which is 44% predicted.  FVC is 1.46 liters, which is 44% predicted, FEV1/FVC ratio 78%.  This test shows restrictive ventilatory defect; however, lung volumes are needed to confirm the diagnosis.  Overall, both FEV1 and FVC are stable to slightly improved over the last several weeks.      IMAGING:  Her chest x-ray from today shows bilaterally clear lung fields with no infiltrate.  She has slight bibasilar effusions.      PHYSICAL EXAMINATION:   /90  Pulse 97  Temp 98.1  F (36.7  C) (Oral)  Ht 1.626 m (5' 4\")  Wt 55.3 kg (121 lb 14.4 oz)  LMP 06/07/2014 (Exact Date)  SpO2 98%  BMI 20.92 kg/m2  GENERAL:  Pleasant female sitting comfortably, speaking in full sentences without any discomfort.   EYES:  Anicteric, no conjunctival pallor.   HEENT:  Mallampati class 3 airway with a large tongue, appears well hydrated.  No cervical or submandibular lymphadenopathy.   PULMONARY:  Normal intensity breath sounds bilaterally, some crackles in the right lower lobe.  No other added sounds.   CARDIOVASCULAR:  S1, S2 normal with no murmurs, rubs or gallops.  Regular rate and rhythm.   ABDOMEN:  Soft, nontender, nondistended, normoactive bowel sounds.   MUSCULOSKELETAL:  " Trace lower extremity edema on the right.  No upper extremity tremors.   NEUROLOGIC:  Grossly intact.   PSYCHIATRIC:  Normal affect.   SKIN:  No rashes on limited exam.  The sternotomy incision has a small area with a vesicular lesion.  No fluctuation or abscess noted.      ASSESSMENT AND PLAN:  The patient is a very pleasant 57-year-old female who is 2 months status post bilateral sequential lung transplant for ILD associated with rheumatoid arthritis.   1.  Bilateral lung transplant.  The patient's PFTs are stable to slightly improved, however these are slow to improve compared to what would be expected for her post transplant phase.  Chest imaging is unremarkable.  She remains on 3-drug immune suppression including tacrolimus with a goal of 10-15 nanograms/mL, mycophenolate 1500 mg twice a day and prednisone 10 mg twice a day.  These medications will continue and she will continue to taper her prednisone as per protocol.  Her last transbronchial biopsy was performed about 3 weeks ago which showed acute cellular rejection; however, some foci of organizing pneumonia were noted.  We will plan for repeat surveillance bronchoscopy in about 10 days from now.  The patient will be seen in clinic prior to that.  Her donor specific antibodies from earlier this month showed a low level DSA for DQB7 which was stable compared to previous tests.  We will check a panel of reactive antibodies the next time she is in clinic.   2.  Infectious disease:  The patient remains on Bactrim for PCP prophylaxis, nystatin for oral candidiasis prophylaxis, and valganciclovir for CMV prophylaxis.  The patient is intermediate risk for CMV reactivation.  She will need 3 months of prophylaxis.   3.  Hypomagnesemia:  The patient remains on magnesium replacement.  Her magnesium level today is acceptable.   4.  Provoked left upper extremity clot and right IJ clot.  The patient is on warfarin, which will be continued for 3 months from the time of  initial clot discovery with a goal INR of 2-3.  Her anticoagulation will be managed around her transbronchial biopsy scheduled for some time next week.   5.  Dermatomyositis with Raynaud's phenomenon.  The patient is awaiting to be seen by rheumatology.   6.  Hypertension.  This appears to be well treated with metoprolol 25 mg twice a day which will be continued.   7.  Right anterior chest wall pain.  There is no evidence of any significant pathology as a cause of the pain.  We checked her blood pressures in both arms and these were similar, thus making aortic pathology unlikely.  At this point, I do not see a reason to pursue any further workup; however, the patient was advised to call us if the pain worsens or does not resolve in the next 1-2 weeks, at which time I would probably start with a CT scan of her chest to evaluate this further.      The patient will be seen in clinic in approximately 1 week and will undergo surveillance bronchoscopy after that.          Data:     I reviewed all resulted lab tests and imaging studies.    Results for orders placed or performed in visit on 04/30/18   General PFT Lab (Please always keep checked)   Result Value Ref Range    FVC-Pred 3.25 L    FVC-Pre 1.46 L    FVC-%Pred-Pre 44 %    FEV1-Pre 1.14 L    FEV1-%Pred-Pre 44 %    FEV1FVC-Pred 79 %    FEV1FVC-Pre 78 %    FEFMax-Pred 6.45 L/sec    FEFMax-Pre 3.15 L/sec    FEFMax-%Pred-Pre 48 %    FEF2575-Pred 2.45 L/sec    FEF2575-Pre 0.88 L/sec    [id number]-%Pred-Pre 36 %    ExpTime-Pre 8.39 sec    FIFMax-Pre 2.71 L/sec    FEV1FEV6-Pred 81 %    FEV1FEV6-Pre 76 %              Medications:     Current Outpatient Prescriptions   Medication     calcium-vitamin D (CALTRATE) 600-400 MG-UNIT per tablet     levalbuterol (XOPENEX HFA) 45 MCG/ACT Inhaler     magnesium oxide (MAG-OX) 400 MG tablet     metoprolol tartrate (LOPRESSOR) 25 MG tablet     multivitamin, therapeutic with minerals (THERA-VIT-M) TABS tablet     mycophenolate (GENERIC  EQUIVALENT) 250 MG capsule     nystatin (MYCOSTATIN) 986012 UNIT/ML suspension     pantoprazole (PROTONIX) 40 MG EC tablet     predniSONE (DELTASONE) 5 MG tablet     senna-docusate (SENOKOT-S;PERICOLACE) 8.6-50 MG per tablet     sulfamethoxazole-trimethoprim (BACTRIM/SEPTRA) 400-80 MG per tablet     tacrolimus (GENERIC EQUIVALENT) 1 MG capsule     valGANciclovir (VALCYTE) 450 MG tablet     warfarin (COUMADIN) 1 MG tablet     enoxaparin (LOVENOX) 60 MG/0.6ML injection     enoxaparin (LOVENOX) 60 MG/0.6ML injection     order for DME     polyethylene glycol (MIRALAX/GLYCOLAX) Packet     [DISCONTINUED] warfarin (COUMADIN) 1 MG tablet     No current facility-administered medications for this visit.             Past Medical and Surgical History:     Past Medical History:   Diagnosis Date     Antisynthetase syndrome (H) 2014     Chronic cough      Dermatomyositis (H)      Dysplasia of cervix, low grade (ESTRADA 1)      ILD (interstitial lung disease) (H)      Osteopenia      Pulmonary hypertension      Raynaud's disease      Seronegative rheumatoid arthritis (H)      Past Surgical History:   Procedure Laterality Date     BRONCHOSCOPY (RIGID OR FLEXIBLE), DIAGNOSTIC N/A 4/10/2018    Procedure: COMBINED BRONCHOSCOPY (RIGID OR FLEXIBLE), LAVAGE;;  Surgeon: Mariposa Donohue MD;  Location:  GI     BRONCHOSCOPY FLEXIBLE N/A 3/13/2018    Procedure: BRONCHOSCOPY FLEXIBLE;  Flexible Bronchoscopy ;  Surgeon: Gissell Sanchez MD;  Location:  GI     ENT SURGERY      tonsillectomy as a child     INSERT EXTRACORPORAL MEMBRANE OXYGENATOR ADULT (OUTSIDE OR) N/A 2/27/2018    Procedure: INSERT EXTRACORPORAL MEMBRANE OXYGENATOR ADULT (OUTSIDE OR);  INSERT EXTRACORPORAL MEMBRANE OXYGENATOR ADULT (OUTSIDE OR) ;  Surgeon: Toby Hernandez MD;  Location:  OR     no prior surgery       REMOVE EXTRACORPORAL MEMBRANE OXYGENATOR ADULT N/A 3/3/2018    Procedure: REMOVE EXTRACORPORAL MEMBRANE OXYGENATOR ADULT;  Removal of Right  Femoral Venous and Right Axillary Arterial Extracorporeal Membrane Oxygenator;  Surgeon: Toby Hernandez MD;  [facility]     TRANSPLANT LUNG RECIPIENT SINGLE X2 Bilateral 3/1/2018    Procedure: TRANSPLANT LUNG RECIPIENT SINGLE X2;  Median Sternotomy, Extracorporeal Membrane Oxygenator, bilateral sequential lung transplant;  Surgeon: Toby Hernandez MD;  [facility]           Family History:     Family History   Problem Relation Age of Onset     Hypertension Mother      Arthritis Mother      Pancreatic Cancer Father      DIABETES Father             Social History:     Social History     Social History     Marital status:      Spouse name: N/A     Number of children: N/A     Years of education: N/A     Occupational History     Not on file.     Social History Main Topics     Smoking status: Never Smoker     Smokeless tobacco: Never Used     Alcohol use No     Drug use: No     Sexual activity: Not on file     Other Topics Concern     Parent/Sibling W/ Cabg, Mi Or Angioplasty Before 65f 55m? No     Social History Narrative       Dean Rliey MD   of Medicine  Pulmonary, Critical Care and Sleep Medicine  [facility]  Pager: [phone]

## 2018-04-30 NOTE — MR AVS SNAPSHOT
Kecia Blue   4/30/2018   Anticoagulation Therapy Visit    Description:  57 year old female   Provider:  Judith Salazar, RN   Department:  [facility]           INR as of 4/30/2018     Today's INR 3.21!      Anticoagulation Summary as of 4/30/2018     INR goal 2.0-3.0   Today's INR 3.21!   Full instructions 4/30: 4 mg; 5/1: 4 mg; 5/2: 4 mg; Otherwise No maintenance plan   Next INR check 5/3/2018    Indications   Lung transplant recipient (H) [Z94.2]  Deep vein thrombosis (DVT) (H) [I82.409] [I82.409]         April 2018 Details    Sun Mon Tue Wed Thu Fri Sat     1               2               3               4               5               6               7                 8               9               10               11               12               13               14                 15               16               17               18               19               20               21                 22               23               24               25               26               27               28                 29               30      4 mg   See details            Date Details   04/30 This INR check               How to take your warfarin dose     To take:  4 mg Take 4 of the 1 mg tablets.           May 2018 Details    Sun Mon Tue Wed Thu Fri Sat       1      4 mg         2      4 mg         3            4               5                 6               7               8               9               10               11               12                 13               14               15               16               17               18               19                 20               21               22               23               24               25               26                 27               28               29               30               31                  Date Details   No additional details    Date of next INR:  5/3/2018         How to take your warfarin dose      To take:  4 mg Take 4 of the 1 mg tablets.

## 2018-04-30 NOTE — MR AVS SNAPSHOT
After Visit Summary   4/30/2018    Kecia Blue    MRN: [id number]           Patient Information     Date Of Birth          1960        Visit Information        Provider Department      4/30/2018 9:00 AM Dean Riley MD [facility] Solid Organ Transplant        Today's Diagnoses     S/P lung transplant (H)        Lung transplant recipient (H)        Organ transplant candidate        Lung disease, interstitial (H)        Abnormal chest CT        Hypotension, unspecified hypotension type        Acute on chronic respiratory failure with hypoxia (H)        ILD (interstitial lung disease) (H)          Care Instructions    Patient Instructions  1. Call me if your chest pain worsens   2. Check blood pressure on both arms today  3. [facility] Fax number: [phone]    Next transplant clinic appointment: 1 weeks with CXR, labs and PFTs and bronchoscopy after  Next lab draw: 1 week    ~~~~~~~~~~~~~~~~~~~~~~~~~    Thoracic Transplant Office phone [phone], fax [phone]    Office Hours 8:30 - 5:00     For after-hours urgent issues, please dial [phone], and ask to speak with the Thoracic Transplant Coordinator  On-Call, pager [phone].  --------------------  To expedite your medication refill(s), please contact your pharmacy and have them fax a refill request to: [phone]  .   *Please allow 3 business days for routine medication refills.  *Please allow 5 business days for controlled substance medication refills.    **For Diabetic medications and supplies refill(s), please contact your pharmacy and have them  Contact your Endocrine team.  --------------------  For scheduling appointments call [facility] transplant :  [phone]. For lab appointments call [phone] or [facility].  --------------------  Please Note: If you are active on [employer], all future test results will be sent by [employer] message only, and will no longer be called to patient. You may also receive communication directly  from your physician.          Follow-ups after your visit        Follow-up notes from your care team     Return in about 1 week (around 5/7/2018).      Your next 10 appointments already scheduled     May 02, 2018 10:00 AM CDT   Pulmonary Treatment with UR PULMONARY REHAB 2   [facility], Cardiac Rehabilitation ([facility])    [address] [place] [address]   [phone]            May 03, 2018  7:00 AM CDT   LAB with UC LAB    [facility] ([facility])    [address] [place] [address] [phone]           Please do not eat 10-12 hours before your appointment if you are coming in fasting for labs on lipids, cholesterol, or glucose (sugar). This does not apply to pregnant women. Water, hot tea and black coffee (with nothing added) are okay. Do not drink other fluids, diet soda or chew gum.            May 04, 2018 10:00 AM CDT   Pulmonary Treatment with UR PULMONARY REHAB 2   [facility], Cardiac Rehabilitation ([facility])    [address] [place] [address]   [phone]            May 08, 2018  7:00 AM CDT   Lab with B[facility] LAB    [facility] ([facility])    [address] [place] [id number] [phone]            May 08, 2018  7:15 AM CDT   XR CHEST 2 VIEWS with UCXR1   [facility] Xray ([facility])    [address] [place] [address] [phone]           Please bring a list of your current medicines to your exam. (Include vitamins, minerals and over-thecounter medicines.) Leave your valuables at home.  Tell your doctor if there is a chance you may be pregnant.  You do not need to do anything special for this exam.            May 08, 2018  7:30 AM CDT    PFT VISIT with  PFL MCKINLEY   [facility] Pulmonary Function Testing ([facility])    [address]  3rd Floor  [facility] [address]   [phone]            May 08, 2018  2:50 PM CDT   (Arrive by 2:35 PM)   Return Lung Transplant with Ame Chow PA-C   [facility] ([facility])    [address]  [address]  [place] [address]   [phone]            May 09, 2018 10:00 AM CDT   Pulmonary Treatment with UR PULMONARY REHAB 2   [facility], Cardiac Rehabilitation ([facility])    [address]  [facility] [address]   [phone]            May 09, 2018   Procedure with Wilber Lin MD   [facility] [place], Endoscopy ([facility])    [address] [place] [address] [phone]           The [facility] is located on the corner of [address] and [address] on the [address]. It is easily accessible from virtually any point in the [place] area, via [address] and [address].            May 11, 2018 10:00 AM CDT   Pulmonary Treatment with UR PULMONARY REHAB 2   [facility], Cardiac Rehabilitation ([facility])    [address]  [facility] [address]   [phone]              Who to contact     If you have questions or need follow up information about today's clinic visit or your schedule please contact [facility] SOLID ORGAN TRANSPLANT directly at [phone].  Normal or non-critical lab and imaging results will be communicated to you by MyChart, letter or phone within 4 business days after the clinic has received the results. If you do not hear from us within 7 days, please contact the clinic through MyChart or phone. If you have a  "critical or abnormal lab result, we will notify you by phone as soon as possible.  Submit refill requests through [employer] or call your pharmacy and they will forward the refill request to us. Please allow 3 business days for your refill to be completed.          Additional Information About Your Visit        [employer] Information     [employer] gives you secure access to your electronic health record. If you see a primary care provider, you can also send messages to your care team and make appointments. If you have questions, please call your primary care clinic.  If you do not have a primary care provider, please call [phone] and they will assist you.        Care EveryWhere ID     This is your Care EveryWhere ID. This could be used by other organizations to access your [place] medical records  [id number]        Your Vitals Were     Pulse Temperature Height Last Period Pulse Oximetry BMI (Body Mass Index)    97 98.1  F (36.7  C) (Oral) 1.626 m (5' 4\") 06/07/2014 (Exact Date) 98% 20.92 kg/m2       Blood Pressure from Last 3 Encounters:   04/30/18 142/90   04/16/18 123/86   04/15/18 (!) 149/91    Weight from Last 3 Encounters:   04/30/18 55.3 kg (121 lb 14.4 oz)   04/27/18 54.9 kg (121 lb)   04/25/18 54 kg (119 lb)                 Today's Medication Changes          These changes are accurate as of 4/30/18 11:59 PM.  If you have any questions, ask your nurse or doctor.               These medicines have changed or have updated prescriptions.        Dose/Directions    predniSONE 5 MG tablet   Commonly known as:  DELTASONE   This may have changed:  additional instructions   Used for:  S/P lung transplant (H)   Changed by:  Dean Riley MD        Take 10 mg twice daily until 5/24, follow taper   Quantity:  300 tablet   Refills:  3            Where to get your medicines      These medications were sent to [facility] [place], [place] - [address]  [address] [place] [address] [address], " [facility] [address]    Hours:  TRANSPLANT PHONE NUMBER [phone] Phone:  [phone]     multivitamin, therapeutic with minerals Tabs tablet    predniSONE 5 MG tablet    sulfamethoxazole-trimethoprim 400-80 MG per tablet                Primary Care Provider Office Phone # Fax #    Rosymanjit Vu [phone]       [facility]  [address]  [facility] [address]        Equal Access to Services     ALBAN VALENCIA : Hadii aad ku hadasho Soomaali, waaxda luqadaha, qaybta kaalmada adeegyada, waxay idiin hayaan adeeg kharash la'aan . So [facility] [phone].    ATENCIÓN: Si habla español, tiene a taylor disposición servicios gratuitos de asistencia lingüística. [facility] [phone].    We comply with applicable federal civil rights laws and [place] laws. We do not discriminate on the basis of race, color, national origin, age, disability, sex, sexual orientation, or gender identity.            Thank you!     Thank you for choosing [facility] SOLID ORGAN TRANSPLANT  for your care. Our goal is always to provide you with excellent care. Hearing back from our patients is one way we can continue to improve our services. Please take a few minutes to complete the written survey that you may receive in the mail after your visit with us. Thank you!             Your Updated Medication List - Protect others around you: Learn how to safely use, store and throw away your medicines at www.disposemymeds.org.          This list is accurate as of 4/30/18 11:59 PM.  Always use your most recent med list.                   Brand Name Dispense Instructions for use Diagnosis    calcium-vitamin D 600-400 MG-UNIT per tablet    CALTRATE    60 tablet    Take 1 tablet by mouth 2 times daily (with meals)    S/P lung transplant (H)       * enoxaparin 60 MG/0.6ML injection    LOVENOX    10 Syringe    Inject 60 mg subcutaneously every 12 hours as directed by Anticoagulation Clinic    Acute deep vein thrombosis (DVT) of  left upper extremity, unspecified vein (H), Lung transplant recipient (H)       * enoxaparin 60 MG/0.6ML injection    LOVENOX    25 Syringe    Inject 60 mg subcutaneously every 12 hours or as directed by Anticoagulation Clinic.    Acute deep vein thrombosis (DVT) of left upper extremity, unspecified vein (H), Lung transplant recipient (H)       levalbuterol 45 MCG/ACT Inhaler    XOPENEX HFA    1 Inhaler    Inhale 2 puffs into the lungs every 6 hours as needed for shortness of breath / dyspnea or wheezing    S/P lung transplant (H), Other constipation       magnesium oxide 400 MG tablet    MAG-OX    30 tablet    Take 1 tablet (400 mg) by mouth daily    Hypomagnesemia       metoprolol tartrate 25 MG tablet    LOPRESSOR    60 tablet    Take 1 tablet (25 mg) by mouth 2 times daily    Paroxysmal atrial fibrillation (H)       multivitamin, therapeutic with minerals Tabs tablet     30 each    Take 1 tablet by mouth daily    Lung transplant recipient (H)       mycophenolate 250 MG capsule    GENERIC EQUIVALENT    360 capsule    Take 6 capsules (1,500 mg) by mouth 2 times daily    S/P lung transplant (H)       nystatin 195049 UNIT/ML suspension    MYCOSTATIN    1200 mL    Take 10 mLs (1,000,000 Units) by mouth 4 times daily    Lung transplant recipient (H)       order for DME     1 Device    Equipment being ordered: Oxygen 5 liters at rest, 15 liters with exertion.  Needs portability.  Please provide 2 10-liter concentrators for in the home    ILD (interstitial lung disease) (H), Hypoxia       pantoprazole 40 MG EC tablet    PROTONIX    30 tablet    Take 1 tablet (40 mg) by mouth every morning    Gastroesophageal reflux disease without esophagitis       polyethylene glycol Packet    MIRALAX/GLYCOLAX    60 packet    Take 17 g by mouth 2 times daily as needed for constipation    Other constipation       predniSONE 5 MG tablet    DELTASONE    300 tablet    Take 10 mg twice daily until 5/24, follow taper    S/P lung transplant  (H)       senna-docusate 8.6-50 MG per tablet    SENOKOT-S;PERICOLACE    100 tablet    Take 2 tablets by mouth 2 times daily as needed for constipation    Other constipation, S/P lung transplant (H)       sulfamethoxazole-trimethoprim 400-80 MG per tablet    BACTRIM/SEPTRA    30 tablet    1 tablet by Oral or NG Tube route daily    Organ transplant candidate, Lung disease, interstitial (H), Abnormal chest CT, Hypotension, unspecified hypotension type, Acute on chronic respiratory failure with hypoxia (H), ILD (interstitial lung disease) (H)       tacrolimus 1 MG capsule    GENERIC EQUIVALENT    300 capsule    Take 5 capsules (5 mg) by mouth 2 times daily    S/P lung transplant (H), Other constipation       valGANciclovir 450 MG tablet    VALCYTE    60 tablet    Take 2 tablets (900 mg) by mouth daily    S/P lung transplant (H)       warfarin 1 MG tablet    COUMADIN    140 tablet    Take 4mg MWFSat and 6mg TuThSun, or as directed by the [facility]    DVT (deep venous thrombosis) (H), Status post lung transplantation (H)       * Notice:  This list has 2 medication(s) that are the same as other medications prescribed for you. Read the directions carefully, and ask your doctor or other care provider to review them with you.

## 2018-04-30 NOTE — PROGRESS NOTES
[facility]  Pulmonary Medicine/Lung Transplant  April 30, 2018         Today's visit note:     PATIENT PROFILE:  Kecia Blue is a 57-year-old female with a history of dermatomyositis, seronegative rheumatoid arthritis, interstitial lung disease, and pulmonary hypertension who was admitted to the hospital with acute worsening of chronic hypoxic respiratory failure in 02/2018 and progressed to [facility] for right heart failure and subsequently underwent bilateral sequential lung transplant on 03/01/2018.  Her post-transplant course was generally uncomplicated; however, she needed pleural effusion tap on the right side later.      INTERVAL HISTORY:  The patient was last seen in our clinic approximately 2 weeks ago and comes in today for a followup.  She denies any shortness of breath at rest or with minimal exertion.  She does have restriction of her exertion due to dyspnea with moderate or heavy exertion.  She reports a cough which is productive of clear sputum, this has been stable since the time of her transplant.  She denies any wheezing or hemoptysis.      She does report that she is experiencing right anterior chest pain.  She had the pain since the time of her transplant, which was more diffuse but more recently it has improved at all other sites but has remained on the right anterior chest.  The pain is 3/10 in intensity when she is resting, but with any deep inspiration, cough or sneeze it increases significantly in intensity.  She takes Tylenol every 8 hours for the pain.  Occasionally, she has noted that the pain radiates to the back.  Overall she reports that the pain has been stable and not progressively increasing.  She denies any trauma to that part.      REVIEW OF SYSTEMS:     1.  She denies any recent fevers, chills or night sweats.  Her appetite is good.     2.  She denies any nasal congestion.  She reports occasional blurry vision, no difficulty with hearing.     3.  She  "denies any palpitations or lightheadedness.   4.  She denies nausea, vomiting, diarrhea, abdominal cramping, or constipation.     5.  She reports significant improvement in her pedal edema.  She has occasional upper extremity tremors.     6.  She reports that her sleep is good.  She has nocturia x2.     7.  She reports her mood is good.   8.  Rest of the review of systems is negative except as noted above.      PULMONARY FUNCTION TESTS:  Her FEV1 is 1.14 liters, which is 44% predicted.  FVC is 1.46 liters, which is 44% predicted, FEV1/FVC ratio 78%.  This test shows restrictive ventilatory defect; however, lung volumes are needed to confirm the diagnosis.  Overall, both FEV1 and FVC are stable to slightly improved over the last several weeks.      IMAGING:  Her chest x-ray from today shows bilaterally clear lung fields with no infiltrate.  She has slight bibasilar effusions.      PHYSICAL EXAMINATION:   /90  Pulse 97  Temp 98.1  F (36.7  C) (Oral)  Ht 1.626 m (5' 4\")  Wt 55.3 kg (121 lb 14.4 oz)  LMP 06/07/2014 (Exact Date)  SpO2 98%  BMI 20.92 kg/m2  GENERAL:  Pleasant female sitting comfortably, speaking in full sentences without any discomfort.   EYES:  Anicteric, no conjunctival pallor.   HEENT:  Mallampati class 3 airway with a large tongue, appears well hydrated.  No cervical or submandibular lymphadenopathy.   PULMONARY:  Normal intensity breath sounds bilaterally, some crackles in the right lower lobe.  No other added sounds.   CARDIOVASCULAR:  S1, S2 normal with no murmurs, rubs or gallops.  Regular rate and rhythm.   ABDOMEN:  Soft, nontender, nondistended, normoactive bowel sounds.   MUSCULOSKELETAL:  Trace lower extremity edema on the right.  No upper extremity tremors.   NEUROLOGIC:  Grossly intact.   PSYCHIATRIC:  Normal affect.   SKIN:  No rashes on limited exam.  The sternotomy incision has a small area with a vesicular lesion.  No fluctuation or abscess noted.      ASSESSMENT AND PLAN:  " The patient is a very pleasant 57-year-old female who is 2 months status post bilateral sequential lung transplant for ILD associated with rheumatoid arthritis.   1.  Bilateral lung transplant.  The patient's PFTs are stable to slightly improved, however these are slow to improve compared to what would be expected for her post transplant phase.  Chest imaging is unremarkable.  She remains on 3-drug immune suppression including tacrolimus with a goal of 10-15 nanograms/mL, mycophenolate 1500 mg twice a day and prednisone 10 mg twice a day.  These medications will continue and she will continue to taper her prednisone as per protocol.  Her last transbronchial biopsy was performed about 3 weeks ago which showed acute cellular rejection; however, some foci of organizing pneumonia were noted.  We will plan for repeat surveillance bronchoscopy in about 10 days from now.  The patient will be seen in clinic prior to that.  Her donor specific antibodies from earlier this month showed a low level DSA for DQB7 which was stable compared to previous tests.  We will check a panel of reactive antibodies the next time she is in clinic.   2.  Infectious disease:  The patient remains on Bactrim for PCP prophylaxis, nystatin for oral candidiasis prophylaxis, and valganciclovir for CMV prophylaxis.  The patient is intermediate risk for CMV reactivation.  She will need 3 months of prophylaxis.   3.  Hypomagnesemia:  The patient remains on magnesium replacement.  Her magnesium level today is acceptable.   4.  Provoked left upper extremity clot and right IJ clot.  The patient is on warfarin, which will be continued for 3 months from the time of initial clot discovery with a goal INR of 2-3.  Her anticoagulation will be managed around her transbronchial biopsy scheduled for some time next week.   5.  Dermatomyositis with Raynaud's phenomenon.  The patient is awaiting to be seen by rheumatology.   6.  Hypertension.  This appears to be well  treated with metoprolol 25 mg twice a day which will be continued.   7.  Right anterior chest wall pain.  There is no evidence of any significant pathology as a cause of the pain.  We checked her blood pressures in both arms and these were similar, thus making aortic pathology unlikely.  At this point, I do not see a reason to pursue any further workup; however, the patient was advised to call us if the pain worsens or does not resolve in the next 1-2 weeks, at which time I would probably start with a CT scan of her chest to evaluate this further.      The patient will be seen in clinic in approximately 1 week and will undergo surveillance bronchoscopy after that.          Data:     I reviewed all resulted lab tests and imaging studies.    Results for orders placed or performed in visit on 04/30/18   General PFT Lab (Please always keep checked)   Result Value Ref Range    FVC-Pred 3.25 L    FVC-Pre 1.46 L    FVC-%Pred-Pre 44 %    FEV1-Pre 1.14 L    FEV1-%Pred-Pre 44 %    FEV1FVC-Pred 79 %    FEV1FVC-Pre 78 %    FEFMax-Pred 6.45 L/sec    FEFMax-Pre 3.15 L/sec    FEFMax-%Pred-Pre 48 %    FEF2575-Pred 2.45 L/sec    FEF2575-Pre 0.88 L/sec    BDP7886-%Pred-Pre 36 %    ExpTime-Pre 8.39 sec    FIFMax-Pre 2.71 L/sec    FEV1FEV6-Pred 81 %    FEV1FEV6-Pre 76 %              Medications:     Current Outpatient Prescriptions   Medication     calcium-vitamin D (CALTRATE) 600-400 MG-UNIT per tablet     levalbuterol (XOPENEX HFA) 45 MCG/ACT Inhaler     magnesium oxide (MAG-OX) 400 MG tablet     metoprolol tartrate (LOPRESSOR) 25 MG tablet     multivitamin, therapeutic with minerals (THERA-VIT-M) TABS tablet     mycophenolate (GENERIC EQUIVALENT) 250 MG capsule     nystatin (MYCOSTATIN) 330855 UNIT/ML suspension     pantoprazole (PROTONIX) 40 MG EC tablet     predniSONE (DELTASONE) 5 MG tablet     senna-docusate (SENOKOT-S;PERICOLACE) 8.6-50 MG per tablet     sulfamethoxazole-trimethoprim (BACTRIM/SEPTRA) 400-80 MG per tablet      tacrolimus (GENERIC EQUIVALENT) 1 MG capsule     valGANciclovir (VALCYTE) 450 MG tablet     warfarin (COUMADIN) 1 MG tablet     enoxaparin (LOVENOX) 60 MG/0.6ML injection     enoxaparin (LOVENOX) 60 MG/0.6ML injection     order for DME     polyethylene glycol (MIRALAX/GLYCOLAX) Packet     [DISCONTINUED] warfarin (COUMADIN) 1 MG tablet     No current facility-administered medications for this visit.             Past Medical and Surgical History:     Past Medical History:   Diagnosis Date     Antisynthetase syndrome (H) 2014     Chronic cough      Dermatomyositis (H)      Dysplasia of cervix, low grade (ESTRADA 1)      ILD (interstitial lung disease) (H)      Osteopenia      Pulmonary hypertension      Raynaud's disease      Seronegative rheumatoid arthritis (H)      Past Surgical History:   Procedure Laterality Date     BRONCHOSCOPY (RIGID OR FLEXIBLE), DIAGNOSTIC N/A 4/10/2018    Procedure: COMBINED BRONCHOSCOPY (RIGID OR FLEXIBLE), LAVAGE;;  Surgeon: Mariposa Donohue MD;  Location:  GI     BRONCHOSCOPY FLEXIBLE N/A 3/13/2018    Procedure: BRONCHOSCOPY FLEXIBLE;  Flexible Bronchoscopy ;  Surgeon: Gissell Sanchez MD;  Location:  GI     ENT SURGERY      tonsillectomy as a child     INSERT EXTRACORPORAL MEMBRANE OXYGENATOR ADULT (OUTSIDE OR) N/A 2/27/2018    Procedure: INSERT EXTRACORPORAL MEMBRANE OXYGENATOR ADULT (OUTSIDE OR);  INSERT EXTRACORPORAL MEMBRANE OXYGENATOR ADULT (OUTSIDE OR) ;  Surgeon: Toby Hernandez MD;  Location:  OR     no prior surgery       REMOVE EXTRACORPORAL MEMBRANE OXYGENATOR ADULT N/A 3/3/2018    Procedure: REMOVE EXTRACORPORAL MEMBRANE OXYGENATOR ADULT;  Removal of Right Femoral Venous and Right Axillary Arterial Extracorporeal Membrane Oxygenator;  Surgeon: Toby Hernandez MD;  Location:  OR     TRANSPLANT LUNG RECIPIENT SINGLE X2 Bilateral 3/1/2018    Procedure: TRANSPLANT LUNG RECIPIENT SINGLE X2;  Median Sternotomy, Extracorporeal Membrane Oxygenator,  bilateral sequential lung transplant;  Surgeon: Toby Hernandez MD;  Location:  OR           Family History:     Family History   Problem Relation Age of Onset     Hypertension Mother      Arthritis Mother      Pancreatic Cancer Father      DIABETES Father             Social History:     Social History     Social History     Marital status:      Spouse name: N/A     Number of children: N/A     Years of education: N/A     Occupational History     Not on file.     Social History Main Topics     Smoking status: Never Smoker     Smokeless tobacco: Never Used     Alcohol use No     Drug use: No     Sexual activity: Not on file     Other Topics Concern     Parent/Sibling W/ Cabg, Mi Or Angioplasty Before 65f 55m? No     Social History Narrative       Dean Riley MD   of Medicine  Pulmonary, Critical Care and Sleep Medicine  [facility]  Pager: [phone]

## 2018-04-30 NOTE — LETTER
4/30/2018      RE: Kecia Blue  [id number] [place]  [facility] [address]       [facility]  Pulmonary Medicine/Lung Transplant  April 30, 2018         Today's visit note:     PATIENT PROFILE:  Kecia Blue is a 57-year-old female with a history of dermatomyositis, seronegative rheumatoid arthritis, interstitial lung disease, and pulmonary hypertension who was admitted to the hospital with acute worsening of chronic hypoxic respiratory failure in 02/2018 and progressed to [facility] for right heart failure and subsequently underwent bilateral sequential lung transplant on 03/01/2018.  Her post-transplant course was generally uncomplicated; however, she needed pleural effusion tap on the right side later.      INTERVAL HISTORY:  The patient was last seen in our clinic approximately 2 weeks ago and comes in today for a followup.  She denies any shortness of breath at rest or with minimal exertion.  She does have restriction of her exertion due to dyspnea with moderate or heavy exertion.  She reports a cough which is productive of clear sputum, this has been stable since the time of her transplant.  She denies any wheezing or hemoptysis.      She does report that she is experiencing right anterior chest pain.  She had the pain since the time of her transplant, which was more diffuse but more recently it has improved at all other sites but has remained on the right anterior chest.  The pain is 3/10 in intensity when she is resting, but with any deep inspiration, cough or sneeze it increases significantly in intensity.  She takes Tylenol every 8 hours for the pain.  Occasionally, she has noted that the pain radiates to the back.  Overall she reports that the pain has been stable and not progressively increasing.  She denies any trauma to that part.      REVIEW OF SYSTEMS:     1.  She denies any recent fevers, chills or night sweats.  Her appetite is good.     2.  She denies any nasal  "congestion.  She reports occasional blurry vision, no difficulty with hearing.     3.  She denies any palpitations or lightheadedness.   4.  She denies nausea, vomiting, diarrhea, abdominal cramping, or constipation.     5.  She reports significant improvement in her pedal edema.  She has occasional upper extremity tremors.     6.  She reports that her sleep is good.  She has nocturia x2.     7.  She reports her mood is good.   8.  Rest of the review of systems is negative except as noted above.      PULMONARY FUNCTION TESTS:  Her FEV1 is 1.14 liters, which is 44% predicted.  FVC is 1.46 liters, which is 44% predicted, FEV1/FVC ratio 78%.  This test shows restrictive ventilatory defect; however, lung volumes are needed to confirm the diagnosis.  Overall, both FEV1 and FVC are stable to slightly improved over the last several weeks.      IMAGING:  Her chest x-ray from today shows bilaterally clear lung fields with no infiltrate.  She has slight bibasilar effusions.      PHYSICAL EXAMINATION:   /90  Pulse 97  Temp 98.1  F (36.7  C) (Oral)  Ht 1.626 m (5' 4\")  Wt 55.3 kg (121 lb 14.4 oz)  LMP 06/07/2014 (Exact Date)  SpO2 98%  BMI 20.92 kg/m2  GENERAL:  Pleasant female sitting comfortably, speaking in full sentences without any discomfort.   EYES:  Anicteric, no conjunctival pallor.   HEENT:  Mallampati class 3 airway with a large tongue, appears well hydrated.  No cervical or submandibular lymphadenopathy.   PULMONARY:  Normal intensity breath sounds bilaterally, some crackles in the right lower lobe.  No other added sounds.   CARDIOVASCULAR:  S1, S2 normal with no murmurs, rubs or gallops.  Regular rate and rhythm.   ABDOMEN:  Soft, nontender, nondistended, normoactive bowel sounds.   MUSCULOSKELETAL:  Trace lower extremity edema on the right.  No upper extremity tremors.   NEUROLOGIC:  Grossly intact.   PSYCHIATRIC:  Normal affect.   SKIN:  No rashes on limited exam.  The sternotomy incision has a small " area with a vesicular lesion.  No fluctuation or abscess noted.      ASSESSMENT AND PLAN:  The patient is a very pleasant 57-year-old female who is 2 months status post bilateral sequential lung transplant for ILD associated with rheumatoid arthritis.   1.  Bilateral lung transplant.  The patient's PFTs are stable to slightly improved, however these are slow to improve compared to what would be expected for her post transplant phase.  Chest imaging is unremarkable.  She remains on 3-drug immune suppression including tacrolimus with a goal of 10-15 nanograms/mL, mycophenolate 1500 mg twice a day and prednisone 10 mg twice a day.  These medications will continue and she will continue to taper her prednisone as per protocol.  Her last transbronchial biopsy was performed about 3 weeks ago which showed acute cellular rejection; however, some foci of organizing pneumonia were noted.  We will plan for repeat surveillance bronchoscopy in about 10 days from now.  The patient will be seen in clinic prior to that.  Her donor specific antibodies from earlier this month showed a low level DSA for DQB7 which was stable compared to previous tests.  We will check a panel of reactive antibodies the next time she is in clinic.   2.  Infectious disease:  The patient remains on Bactrim for PCP prophylaxis, nystatin for oral candidiasis prophylaxis, and valganciclovir for CMV prophylaxis.  The patient is intermediate risk for CMV reactivation.  She will need 3 months of prophylaxis.   3.  Hypomagnesemia:  The patient remains on magnesium replacement.  Her magnesium level today is acceptable.   4.  Provoked left upper extremity clot and right IJ clot.  The patient is on warfarin, which will be continued for 3 months from the time of initial clot discovery with a goal INR of 2-3.  Her anticoagulation will be managed around her transbronchial biopsy scheduled for some time next week.   5.  Dermatomyositis with Raynaud's phenomenon.  The  patient is awaiting to be seen by rheumatology.   6.  Hypertension.  This appears to be well treated with metoprolol 25 mg twice a day which will be continued.   7.  Right anterior chest wall pain.  There is no evidence of any significant pathology as a cause of the pain.  We checked her blood pressures in both arms and these were similar, thus making aortic pathology unlikely.  At this point, I do not see a reason to pursue any further workup; however, the patient was advised to call us if the pain worsens or does not resolve in the next 1-2 weeks, at which time I would probably start with a CT scan of her chest to evaluate this further.      The patient will be seen in clinic in approximately 1 week and will undergo surveillance bronchoscopy after that.          Data:     I reviewed all resulted lab tests and imaging studies.    Results for orders placed or performed in visit on 04/30/18   General PFT Lab (Please always keep checked)   Result Value Ref Range    FVC-Pred 3.25 L    FVC-Pre 1.46 L    FVC-%Pred-Pre 44 %    FEV1-Pre 1.14 L    FEV1-%Pred-Pre 44 %    FEV1FVC-Pred 79 %    FEV1FVC-Pre 78 %    FEFMax-Pred 6.45 L/sec    FEFMax-Pre 3.15 L/sec    FEFMax-%Pred-Pre 48 %    FEF2575-Pred 2.45 L/sec    FEF2575-Pre 0.88 L/sec    ITN4260-%Pred-Pre 36 %    ExpTime-Pre 8.39 sec    FIFMax-Pre 2.71 L/sec    FEV1FEV6-Pred 81 %    FEV1FEV6-Pre 76 %              Medications:     Current Outpatient Prescriptions   Medication     calcium-vitamin D (CALTRATE) 600-400 MG-UNIT per tablet     levalbuterol (XOPENEX HFA) 45 MCG/ACT Inhaler     magnesium oxide (MAG-OX) 400 MG tablet     metoprolol tartrate (LOPRESSOR) 25 MG tablet     multivitamin, therapeutic with minerals (THERA-VIT-M) TABS tablet     mycophenolate (GENERIC EQUIVALENT) 250 MG capsule     nystatin (MYCOSTATIN) 926675 UNIT/ML suspension     pantoprazole (PROTONIX) 40 MG EC tablet     predniSONE (DELTASONE) 5 MG tablet     senna-docusate (SENOKOT-S;PERICOLACE) 8.6-50  MG per tablet     sulfamethoxazole-trimethoprim (BACTRIM/SEPTRA) 400-80 MG per tablet     tacrolimus (GENERIC EQUIVALENT) 1 MG capsule     valGANciclovir (VALCYTE) 450 MG tablet     warfarin (COUMADIN) 1 MG tablet     enoxaparin (LOVENOX) 60 MG/0.6ML injection     enoxaparin (LOVENOX) 60 MG/0.6ML injection     order for DME     polyethylene glycol (MIRALAX/GLYCOLAX) Packet     [DISCONTINUED] warfarin (COUMADIN) 1 MG tablet     No current facility-administered medications for this visit.             Past Medical and Surgical History:     Past Medical History:   Diagnosis Date     Antisynthetase syndrome (H) 2014     Chronic cough      Dermatomyositis (H)      Dysplasia of cervix, low grade (ESTRADA 1)      ILD (interstitial lung disease) (H)      Osteopenia      Pulmonary hypertension      Raynaud's disease      Seronegative rheumatoid arthritis (H)      Past Surgical History:   Procedure Laterality Date     BRONCHOSCOPY (RIGID OR FLEXIBLE), DIAGNOSTIC N/A 4/10/2018    Procedure: COMBINED BRONCHOSCOPY (RIGID OR FLEXIBLE), LAVAGE;;  Surgeon: Mariposa Donohue MD;  Location:  GI     BRONCHOSCOPY FLEXIBLE N/A 3/13/2018    Procedure: BRONCHOSCOPY FLEXIBLE;  Flexible Bronchoscopy ;  Surgeon: Gissell Sanchez MD;  Location:  GI     ENT SURGERY      tonsillectomy as a child     INSERT EXTRACORPORAL MEMBRANE OXYGENATOR ADULT (OUTSIDE OR) N/A 2/27/2018    Procedure: INSERT EXTRACORPORAL MEMBRANE OXYGENATOR ADULT (OUTSIDE OR);  INSERT EXTRACORPORAL MEMBRANE OXYGENATOR ADULT (OUTSIDE OR) ;  Surgeon: Toby Hernandez MD;  Location:  OR     no prior surgery       REMOVE EXTRACORPORAL MEMBRANE OXYGENATOR ADULT N/A 3/3/2018    Procedure: REMOVE EXTRACORPORAL MEMBRANE OXYGENATOR ADULT;  Removal of Right Femoral Venous and Right Axillary Arterial Extracorporeal Membrane Oxygenator;  Surgeon: Toby Hernandez MD;  Location: [facility]     TRANSPLANT LUNG RECIPIENT SINGLE X2 Bilateral 3/1/2018    Procedure:  TRANSPLANT LUNG RECIPIENT SINGLE X2;  Median Sternotomy, Extracorporeal Membrane Oxygenator, bilateral sequential lung transplant;  Surgeon: Toby Hernandez MD;  Location:  OR           Family History:     Family History   Problem Relation Age of Onset     Hypertension Mother      Arthritis Mother      Pancreatic Cancer Father      DIABETES Father             Social History:     Social History     Social History     Marital status:      Spouse name: N/A     Number of children: N/A     Years of education: N/A     Occupational History     Not on file.     Social History Main Topics     Smoking status: Never Smoker     Smokeless tobacco: Never Used     Alcohol use No     Drug use: No     Sexual activity: Not on file     Other Topics Concern     Parent/Sibling W/ Cabg, Mi Or Angioplasty Before 65f 55m? No     Social History Narrative       Dean Riley MD   of Medicine  Pulmonary, Critical Care and Sleep Medicine  [facility]  Pager: [phone]                    Dean Riley MD

## 2018-04-30 NOTE — PROGRESS NOTES
Addendum 4/30/18    Evelyn Wright RN notified us that Kecia will have her next Bronchoscopy on 5/9/18.  Kecia needs to bridge with Lovenox 60mgs subq every 12 hours. (She has enough syringes)    I spoke with  Kecia and gave her the following Bridging plan:  5/3/18 Last day of Warfarin Patient will get an INR this Date  5/4/18 Hold Warfarin Start Lovenox in PM  5/5/18 Hold Warfarin Lovenox AM and PM  5/6/18 Hold Warfarin Lovenox AM and PM   5/7/18 Hold Warfarin Lovenox AM and PM  5/8/17 Hold Warfarin and Lovenox  5/9/18 Day of procedure Kecia will check with provider about restarting Warfarin and Lovenox

## 2018-05-02 ENCOUNTER — HOSPITAL ENCOUNTER (OUTPATIENT)
Dept: CARDIAC REHAB | Facility: CLINIC | Age: 58
End: 2018-05-02
Attending: PHYSICAL MEDICINE & REHABILITATION
Payer: COMMERCIAL

## 2018-05-02 VITALS — BODY MASS INDEX: 20.94 KG/M2 | WEIGHT: 122 LBS

## 2018-05-02 VITALS — HEIGHT: 64 IN | BODY MASS INDEX: 20.66 KG/M2 | WEIGHT: 121 LBS

## 2018-05-02 LAB
EXPTIME-PRE: 7.98 SEC
FEF2575-%PRED-PRE: 34 %
FEF2575-PRE: 0.85 L/SEC
FEF2575-PRED: 2.45 L/SEC
FEFMAX-%PRED-PRE: 51 %
FEFMAX-PRE: 3.34 L/SEC
FEFMAX-PRED: 6.45 L/SEC
FEV1-%PRED-PRE: 40 %
FEV1-PRE: 1.04 L
FEV1FEV6-PRE: 80 %
FEV1FEV6-PRED: 81 %
FEV1FVC-PRE: 78 %
FEV1FVC-PRED: 79 %
FIFMAX-PRE: 2.29 L/SEC
FVC-%PRED-PRE: 40 %
FVC-PRE: 1.33 L
FVC-PRED: 3.25 L

## 2018-05-02 PROCEDURE — 40000244 ZZH STATISTIC VISIT PULM REHAB

## 2018-05-02 PROCEDURE — G0239 OTH RESP PROC, GROUP: HCPCS

## 2018-05-02 NOTE — PROGRESS NOTES
04/27/18 1000   Session   Session 30 Day Individualized Treatment Plan   Certified through this date 05/30/18   Type Reassessment   General Information   Treatment Diagnosis Lung transplant   Classification of COPD NA   Onset Date 03/01/18   Hospital Location [facility] Discharge Date 03/24/18   Current Signs and Symptoms None   Outpatient Pulmonary Rehab Start Date 04/06/18   Primary Physician Rosy Anne Jordan   Other Specialty Provider Evelyn Wright   Medical History/Comorbidities   Medical History Comments Raynauds, dermatomyositis, LUE DVT on blood thinner, osteopenia   Untoward Events/Exacerbations/Hospitalizations   Untoward Events/Exacerbations/Hospitalizations None   Sputum   Sputum Production Amount None   Tobacco History   Tobacco Never smoker   Interventions Planned (NA)   Interventions Completed (NA)   Medications   Long-Acting Beta Agonist Not prescribed   Short-Acting Beta Agonist Not prescribed   Long-Acting Anticholinergic Not prescribed   Short-Acting Anticholinergic Not prescribed   Inhaled Corticosteroid Not prescribed   Oral Corticosteroid Prescribed, taking as prescribed   Medications Reconciled By Medical record;Patient   Preventative Vaccinations   Influenza Vaccination Yes   Pneumonia Vaccination Yes   Pain   Patient Currently in Pain Denies   Fall Risk Screen   Fall screen completed by Pulmonary Rehab   Have you fallen 2 or more times in the past year? No   Have you fallen and had an injury in the past year? No   Living and Work Status   Living Arrangements apartment   Support System Live with an adult   Environmental Factors No concerns   ADL Limitations Stair climbing   Initial Duke Activity Status Index (DASI) score. A measure of functional capacity. The goal is to have a pre-program raw score of 9.95 (~4 METs) or above 30.2   Initial DASI VO2 Peak (ml*kg-1*min-1) 22.59   Initial DASI MET Level 6.45  "  Occupation    Return to Employment Yes   Physical Assessments   Incisions WNL   Edema Not assessed   Left Lung Sounds not assessed   Right Lung Sounds not assessed   Pulmonary Function Test (PFTs)   PFT Results Available   Date Completed 04/03/18   FVC Actual 1.1   % Predicted FVC 33   FEV1 Actual 1.01   % Predicted FEV1 38   FEV1/FEV Ratio 92   Pre/Post Bronchodilator Pre   Airway Obstruction NA   Individualized Treatment Plan   Sessions Scheduled 18   Sessions Attended 7   Type Aerobic exercise;Resistance training;Other (comments)   Oxygen Use   Supplemental Oxygen Needed No   Exercise Prescription   Mode Treadmill;Nustep;Weights;Sci-Fit;Ambulation   Frequency 2 days/week   Duration/Time 15-30 min;Intermittent bouts   THR (85% of age predicted max heart rate)  140.25   Effort Rating (0-10) 4-6/10   Progression of Exercise Increase 0.15-0.25 METs per week   Oxygen Titration with Exercise > 88% with exercise   Exercise Assessment   6 Minute Walk Predicted - Gender Selection Female   6 Minute Walk Predicted (Female) 566.24   6 Minute Walk Predicted (Male) 548.99   6 Minute Walk Distance (Initial) 167.64 Meters   Resting HR 96   Exercise    Resting /78   SpO2 97   Exercise SpO2 99   Current MET level 2.5   Exercise Tolerance fair   Normal Limits Discussed Yes   Current Symptoms at Home Denies symptoms   Current Symptoms in Rehab Denies symptoms   Limitations deconditioned   Nutrition Management   Age 57   Height 1.626 m (5' 4.02\")   Weight 54.9 kg (121 lb)   BMI (Calculated) 20.8   Assessment Normal   Interventions Planned NA   Psychosocial   Initial Patient Health Questionnaire -9 (PHQ-9) for depression. To notify physician if pre-score >9. 5   Initial COPD Assessment Test (CAT). A quality of life measure. Scores range from 0-40. Higher scores indicate greater levels of limitations. The goal is to reduce scores pre to post program. 19   Initial Shortness of Breath Questionnaire (SOBQ) score. " The goal is to reduce the score pre to post program. 42   Intervention Planned Use breathing techniques to control dyspnea cycle   Interventions Completed Demonstrated ability to apply breathing techniques to control dyspnea cycle   Stages of Change   Aerobic Exercise Action   Physical Activity Action   Recommended diet Action   Stress (NA)   Smoking Cessation NA   Oxygen Usage NA   Current Home Exercise   Type of Exercise Walking;Treadmill   Frequency (days per week) 1-2   Duration (minutes per session) 30   Recommended Home Exercise Prescription   Type of Exercise Walking;LE Strengthening Exercise   Frequency (Days per week) 1   Duration (minutes per session) 15-30 min   Effort Rating Recommended (0-10) Scale  4-6/10   Learning Assessment   Learner Patient   Primary Language English   Preferred Learning Style Listening;Reading;Demonstration;Pictures/Video   Barriers to Learning No barriers noted   Patient Education/Referrals   Education Recommended Breathing Techniques;Panic and Anxiety   Follow-up/On-going Support   Provider follow-up needed on the following No follow-up needed   Pulmonary Rehab Goals   Pulmonary Rehab Goals 1   Goal 1   Goal Increase endurance to be able to tolerate walking 0.5 mile and increase performance of ADLS (stair climbing) by establishing an exercise routine 3 days/week   Target Date 07/06/18   Progress Towards Goal 4/27 Patient has been progressing well in rehab, currently able to tolerate 2.5 METs for 15 minutes on TM (1.8MPH/0.5% grade). Patient plans to increase speed and grade next session. Patient also has been walking 1-2 days/week outside of rehab for 15-30 minutes.    Assessment   Assessment Patient is a pleasant 57 year-old, female status post bilateral lung transplant on 3/1/18 for ILD, and history of osteopenia, dermatomyositis.  Patient was admitted for emergent lung transplant on 2/21/18. Patient stated that she is doing well post hospitalization and stated that she is  deconditioned. Patient appears highly motivated to attend rehab to improve endurance and muscle strength to improve quality of life to return home. 4/27 Patient has been progressing well in rehab and increasing workloads as tolerated. Patient also encouraged to begin muscle conditioning exercises at least 1 day/week to aid in improving endurance post transplant.  Patient stated that she may be discharged after bronch in few weeks. Skilled therapy is recommended to monitor cardiopulmonary response to exercise, and assist patient in maintaining exercise routine to achieve goals to improve quality of life post transplant.    I have reviewed and agree with this patient s individual treatment plan and exercise prescription for respiratory therapy.  Please see  individual treatment plan for details of progress and plan.

## 2018-05-02 NOTE — ADDENDUM NOTE
Encounter addended by: Sharonda Vail on: 5/2/2018  5:58 PM<BR>     Actions taken: Flowsheet data copied forward, Pend clinical note, Flowsheet accepted, Sign clinical note

## 2018-05-03 ENCOUNTER — ANTICOAGULATION THERAPY VISIT (OUTPATIENT)
Dept: ANTICOAGULATION | Facility: CLINIC | Age: 58
End: 2018-05-03

## 2018-05-03 DIAGNOSIS — Z94.2 LUNG TRANSPLANT RECIPIENT (H): ICD-10-CM

## 2018-05-03 DIAGNOSIS — I82.622 ACUTE DEEP VEIN THROMBOSIS (DVT) OF LEFT UPPER EXTREMITY, UNSPECIFIED VEIN (H): ICD-10-CM

## 2018-05-03 DIAGNOSIS — I82.409 DEEP VEIN THROMBOSIS (DVT) (H): ICD-10-CM

## 2018-05-03 LAB — INR PPP: 3.31 (ref 0.86–1.14)

## 2018-05-03 NOTE — MR AVS SNAPSHOT
Kecia Blue   5/3/2018   Anticoagulation Therapy Visit    Description:  57 year old female   Provider:  Katie Bush, RN   Department:  [facility]           INR as of 5/3/2018     Today's INR 3.31!      Anticoagulation Summary as of 5/3/2018     INR goal 2.0-3.0   Today's INR 3.31!   Full instructions 5/3: Hold; 5/4: Hold; 5/5: Hold; 5/6: Hold; 5/7: Hold; 5/8: Hold; Otherwise No maintenance plan   Next INR check 5/9/2018    Indications   Lung transplant recipient (H) [Z94.2]  Deep vein thrombosis (DVT) (H) [I82.409] [I82.409]         May 2018 Details    Sun Mon Tue Wed Thu Fri Sat       1               2               3      Hold   See details      4      Hold         5      Hold           6      Hold         7      Hold         8      Hold         9            10               11               12                 13               14               15               16               17               18               19                 20               21               22               23               24               25               26                 27               28               29               30               31                  Date Details   05/03 This INR check       Date of next INR:  5/9/2018         How to take your warfarin dose     Hold Do not take your warfarin dose. See the Details table to the right for additional instructions.

## 2018-05-03 NOTE — PROGRESS NOTES
ANTICOAGULATION FOLLOW-UP CLINIC VISIT    Patient Name:  Kecia Blue  Date:  5/3/2018  Contact Type:  Telephone    SUBJECTIVE:     Patient Findings     Comments Patient is scheduled for a bronch on 5/9.  She was given instructions to start holding warfarin today and bridge with Lovenox.           OBJECTIVE    INR   Date Value Ref Range Status   05/03/2018 3.31 (H) 0.86 - 1.14 Final       ASSESSMENT / PLAN  No question data found.  Anticoagulation Summary as of 5/3/2018     INR goal 2.0-3.0   Today's INR 3.31!   Maintenance plan No maintenance plan   Full instructions 5/3: Hold; 5/4: Hold; 5/5: Hold; 5/6: Hold; 5/7: Hold; 5/8: Hold; Otherwise No maintenance plan   Next INR check 5/9/2018   Priority INR   Target end date     Indications   Lung transplant recipient (H) [Z94.2]  Deep vein thrombosis (DVT) (H) [I82.409] [I82.409]         Anticoagulation Episode Summary     INR check location Clinic Lab    Preferred lab     Send INR reminders to [facility]    Comments Plan for 3 months of therapy- Provoked DVT. Welcome package sent . Patient staying at [facility] [place] and  Verbal OK to speak with  and leave messages on Cell [phone]      Anticoagulation Care Providers     Provider Role Specialty Phone number    Ame Chow PA-C Responsible Pulmonary [phone]            See the Encounter Report to view Anticoagulation Flowsheet and Dosing Calendar (Go to Encounters tab in chart review, and find the Anticoagulation Therapy Visit)    Spoke with Kecia.    Katie Bush RN                 ANTICOAGULATION FOLLOW-UP CLINIC VISIT    Patient Name:  Kecia Blue  Date:  5/3/2018  Contact Type:  Telephone    SUBJECTIVE:     Patient Findings     Comments Patient is scheduled for a bronch on 5/9.  She was given instructions to start holding warfarin today and bridge with Lovenox.  See addendum from previous note.            OBJECTIVE    INR   Date Value Ref Range Status    05/03/2018 3.31 (H) 0.86 - 1.14 Final       ASSESSMENT / PLAN  INR assessment SUPRA    Recheck INR In: 1 WEEK    INR Location Clinic      Anticoagulation Summary as of 5/3/2018     INR goal 2.0-3.0   Today's INR 3.31!   Maintenance plan No maintenance plan   Full instructions 5/3: Hold; 5/4: Hold; 5/5: Hold; 5/6: Hold; 5/7: Hold; 5/8: Hold; Otherwise No maintenance plan   Next INR check 5/9/2018   Priority INR   Target end date     Indications   Lung transplant recipient (H) [Z94.2]  Deep vein thrombosis (DVT) (H) [I82.409] [I82.409]         Anticoagulation Episode Summary     INR check location Clinic Lab    Preferred lab     Send INR reminders to [facility]    Comments Plan for 3 months of therapy- Provoked DVT. Welcome package sent . Patient staying at [facility] [place] and  Verbal OK to speak with  and leave messages on Cell [phone]      Anticoagulation Care Providers     Provider Role Specialty Phone number    Ame Chow PA-C Responsible Pulmonary [phone]            See the Encounter Report to view Anticoagulation Flowsheet and Dosing Calendar (Go to Encounters tab in chart review, and find the Anticoagulation Therapy Visit)    Spoke with Kecia Bush RN

## 2018-05-04 ENCOUNTER — HOSPITAL ENCOUNTER (OUTPATIENT)
Dept: CARDIAC REHAB | Facility: CLINIC | Age: 58
End: 2018-05-04
Attending: PHYSICAL MEDICINE & REHABILITATION
Payer: COMMERCIAL

## 2018-05-04 VITALS — WEIGHT: 123 LBS | BODY MASS INDEX: 21.11 KG/M2

## 2018-05-04 PROCEDURE — 40000244 ZZH STATISTIC VISIT PULM REHAB

## 2018-05-04 PROCEDURE — G0239 OTH RESP PROC, GROUP: HCPCS

## 2018-05-07 NOTE — PROGRESS NOTES
[facility]  [facility]  May 8, 2018         Assessment and Plan:   Mrs. Kecia Blue is a 56 y/o female with h/o dermatomyositis, seronegative RA, ILD and pulmonary hypertension who was admitted for emergent lung transplant workup on 2/21. [facility] and subsequently received a bilateral lung transplant on 3/1/18. Post operative course complicated by right-sided pleural effusion s/p thoracentesis. She is seen today for routine follow up.      Next surveillance bronchoscopy: tomorrow on 5/9  Coordinator/MD: IMMANUEL/AL      1. S/p bilateral lung transplant: notes increased dry cough with a upper airway wheeze X 4 days, since she went home last weekend. Also with ongoing anterior chest wall and right sided pleuritic pain, maybe slightly improved. Sating 98% on room air. CMV 4/18 negative. S/p bronch on 4/10 without evidence of rejection (ISHLT A0B0), but did demonstrate foci of organizing pneumonia. CXR reviewed by me today demonstrates small, stable bilateral effusions. PFTs with minimal improvement compared to last visit, still below previous post transplant vest. Given new cough, wheeze and ongoing pleuritic chest pain, will obtain CT chest today. Scheduled for bronch tomorrow.  - CT chest now  - Continue immunosuppresion including mycophenolate 1500 mg BID, tacrolimus (goal 10-15) and prednisone taper  - Current prophylaxis with Bactrim, Valcyte 900 mg daily (CMV D+/R+) and nystatin    2. 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  - DSA pending for today      3. Hypomagnesemia: secondary to CNI use. Mg of 1.6 today.   - Continue Mg oxide     4. Provoked LUE DVT: on ultrasound on 3/7 demonstrating occlusive thrombus in the left subclavian vein, axillary vein, basilic vein in the upper arm, cephalic vein near junction with the subclavian vein and cephalic vein from mid arm to the wrist. Repeat US on 4/16 with  decreased subclavian thrombus and resolved axillary thrombus, but new (vs previously obscured) right IJ clot. On warfarin, goal INR 2-3.   - Coumadin on hold for bronch tomorrow  - Will need at least 3 months of AC (possibly from US on 4/16)--US  prior to discontinuation     5. High risk donor: will need 3 and 12 months labs.     6. Dermatomyositis: with likely econdary Raynauds (per patient not officially diagnosed). She has pronounced purple discoloration of the digits on her left hand and right index finger. Cold to the touch. Also notes this occurs in her toes. Was on Imuran, tacrolimus and prednisone prior to transplant.   - Has appt with Rheumatology scheduled for 5/10 to establish care     7. HTN: BP mainly 130/80s.   - Continue metoprolol 25 mg BID      RTC: 2 weeks  Influenza and other vaccinations:   Annual dermatology visit:    Ame Chow PA-C  Pulmonary, Allergy, Critical Care and Sleep Medicine         Interval History:   Notes ongoing chest wall pain, increased on the right mid/lateral chest wall with coughing, more sharp. Also notes she is coughing more for the last few days, starting after being home last weekend and outside. Non productive. Notes an upper airway wheeze that started with the cough. Has been using the Xopenex inhaler with some relief. Overall thinks the pain is getting better, not as sharp. No fever or chills, no headaches, no sinus congestion or drainage. No change in breathing, no shortness of breath other than with exertion, such as the end of exercise/walking, endurance has improved. No GI complaints, no diarrhea or constipation. Sleep is okay, appetite has improved.          Review of Systems:   Please see HPI, otherwise the complete 10 point ROS is negative.           Past Medical and Surgical History:     Past Medical History:   Diagnosis Date     Antisynthetase syndrome (H) 2014     Chronic cough      Dermatomyositis (H)      Dysplasia of cervix, low grade (ESTRADA 1)      ILD  (interstitial lung disease) (H)      Osteopenia      Pulmonary hypertension      Raynaud's disease      Seronegative rheumatoid arthritis (H)      Past Surgical History:   Procedure Laterality Date     BRONCHOSCOPY (RIGID OR FLEXIBLE), DIAGNOSTIC N/A 4/10/2018    Procedure: COMBINED BRONCHOSCOPY (RIGID OR FLEXIBLE), LAVAGE;;  Surgeon: Mariposa Donohue MD;  Location:  GI     BRONCHOSCOPY FLEXIBLE N/A 3/13/2018    Procedure: BRONCHOSCOPY FLEXIBLE;  Flexible Bronchoscopy ;  Surgeon: Gissell Sanchez MD;  Location:  GI     ENT SURGERY      tonsillectomy as a child     INSERT EXTRACORPORAL MEMBRANE OXYGENATOR ADULT (OUTSIDE OR) N/A 2/27/2018    Procedure: INSERT EXTRACORPORAL MEMBRANE OXYGENATOR ADULT (OUTSIDE OR);  INSERT EXTRACORPORAL MEMBRANE OXYGENATOR ADULT (OUTSIDE OR) ;  Surgeon: Toby Hernandez MD;  Location:  OR     no prior surgery       REMOVE EXTRACORPORAL MEMBRANE OXYGENATOR ADULT N/A 3/3/2018    Procedure: REMOVE EXTRACORPORAL MEMBRANE OXYGENATOR ADULT;  Removal of Right Femoral Venous and Right Axillary Arterial Extracorporeal Membrane Oxygenator;  Surgeon: Toby Hernandez MD;  Location:  OR     TRANSPLANT LUNG RECIPIENT SINGLE X2 Bilateral 3/1/2018    Procedure: TRANSPLANT LUNG RECIPIENT SINGLE X2;  Median Sternotomy, Extracorporeal Membrane Oxygenator, bilateral sequential lung transplant;  Surgeon: Toby Hernandez MD;  Location: [facility]           Family History:     Family History   Problem Relation Age of Onset     Hypertension Mother      Arthritis Mother      Pancreatic Cancer Father      DIABETES Father             Social History:     Social History     Social History     Marital status:      Spouse name: N/A     Number of children: N/A     Years of education: N/A     Occupational History     Not on file.     Social History Main Topics     Smoking status: Never Smoker     Smokeless tobacco: Never Used     Alcohol use No     Drug use: No      "Sexual activity: Not on file     Other Topics Concern     Parent/Sibling W/ Cabg, Mi Or Angioplasty Before 65f 55m? No     Social History Narrative            Medications:     Current Outpatient Prescriptions   Medication     calcium-vitamin D (CALTRATE) 600-400 MG-UNIT per tablet     levalbuterol (XOPENEX HFA) 45 MCG/ACT Inhaler     magnesium oxide (MAG-OX) 400 MG tablet     metoprolol tartrate (LOPRESSOR) 25 MG tablet     multivitamin, therapeutic with minerals (THERA-VIT-M) TABS tablet     mycophenolate (GENERIC EQUIVALENT) 250 MG capsule     nystatin (MYCOSTATIN) 348702 UNIT/ML suspension     order for DME     pantoprazole (PROTONIX) 40 MG EC tablet     polyethylene glycol (MIRALAX/GLYCOLAX) Packet     predniSONE (DELTASONE) 5 MG tablet     sulfamethoxazole-trimethoprim (BACTRIM/SEPTRA) 400-80 MG per tablet     tacrolimus (GENERIC EQUIVALENT) 1 MG capsule     valGANciclovir (VALCYTE) 450 MG tablet     enoxaparin (LOVENOX) 60 MG/0.6ML injection     enoxaparin (LOVENOX) 60 MG/0.6ML injection     senna-docusate (SENOKOT-S;PERICOLACE) 8.6-50 MG per tablet     warfarin (COUMADIN) 1 MG tablet     No current facility-administered medications for this visit.             Physical Exam:   /86  Pulse 97  Resp 17  Ht 1.626 m (5' 4\")  Wt 55.3 kg (122 lb)  LMP 06/07/2014 (Exact Date)  SpO2 98%  BMI 20.94 kg/m2    GENERAL: alert, NAD  HEENT: NCAT, EOMI, no scleral icterus, oral mucosa moist and without lesions  Neck: no cervical or supraclavicular adenopathy  Lungs: moderate airflow with scattered expiratory wheezes  CV: RRR, S1S2, no murmurs noted  Abdomen: normoactive BS, soft, non tender  Lymph: no edema  Neuro: AAO X 3, CN 2-12 grossly intact  Psychiatric: normal affect, good eye contact  Skin: no rash, jaundice or lesions on limited exam         Data:   All laboratory and imaging data reviewed.      Recent Results (from the past 168 hour(s))   INR    Collection Time: 05/03/18  6:53 AM   Result Value Ref Range "    INR 3.31 (H) 0.86 - 1.14   Basic metabolic panel    Collection Time: 05/08/18  7:09 AM   Result Value Ref Range    Sodium 137 133 - 144 mmol/L    Potassium 4.6 3.4 - 5.3 mmol/L    Chloride 102 94 - 109 mmol/L    Carbon Dioxide 26 20 - 32 mmol/L    Anion Gap 9 3 - 14 mmol/L    Glucose 96 70 - 99 mg/dL    Urea Nitrogen 23 7 - 30 mg/dL    Creatinine 0.99 0.52 - 1.04 mg/dL    GFR Estimate 58 (L) >60 mL/min/1.7m2    GFR Estimate If Black 70 >60 mL/min/1.7m2    Calcium 9.2 8.5 - 10.1 mg/dL   Magnesium    Collection Time: 05/08/18  7:09 AM   Result Value Ref Range    Magnesium 1.6 1.6 - 2.3 mg/dL   CBC with platelets    Collection Time: 05/08/18  7:09 AM   Result Value Ref Range    WBC 7.7 4.0 - 11.0 10e9/L    RBC Count 3.06 (L) 3.8 - 5.2 10e12/L    Hemoglobin 9.6 (L) 11.7 - 15.7 g/dL    Hematocrit 31.3 (L) 35.0 - 47.0 %     (H) 78 - 100 fl    MCH 31.4 26.5 - 33.0 pg    MCHC 30.7 (L) 31.5 - 36.5 g/dL    RDW 15.6 (H) 10.0 - 15.0 %    Platelet Count 439 150 - 450 10e9/L   INR    Collection Time: 05/08/18  7:09 AM   Result Value Ref Range    INR 1.08 0.86 - 1.14   Tacrolimus level    Collection Time: 05/08/18  7:10 AM   Result Value Ref Range    Tacrolimus Last Dose 1845     Tacrolimus Level 7.3 5.0 - 15.0 ug/L   General PFT Lab (Please always keep checked)    Collection Time: 05/08/18  7:18 AM   Result Value Ref Range    FVC-Pred 3.25 L    FVC-Pre 1.53 L    FVC-%Pred-Pre 47 %    FEV1-Pre 1.17 L    FEV1-%Pred-Pre 45 %    FEV1FVC-Pred 79 %    FEV1FVC-Pre 76 %    FEFMax-Pred 6.45 L/sec    FEFMax-Pre 3.72 L/sec    FEFMax-%Pred-Pre 57 %    FEF2575-Pred 2.45 L/sec    FEF2575-Pre 0.92 L/sec    [id number]-%Pred-Pre 37 %    ExpTime-Pre 6.83 sec    FIFMax-Pre 3.21 L/sec    FEV1FEV6-Pred 81 %    FEV1FEV6-Pre 76 %     PFT interpretation:  Maneuver: valid, but ATS guidelines not met  Normal ratio with decreased FEV1 and FVC  Compared to prior: FEV1 of 1.17 is 30 cc above prior  The decrease in FVC may represent restrictive  physiology.  Lung volumes would be necessary to determine.

## 2018-05-08 ENCOUNTER — OFFICE VISIT (OUTPATIENT)
Dept: PULMONOLOGY | Facility: CLINIC | Age: 58
End: 2018-05-08
Attending: PHYSICIAN ASSISTANT
Payer: COMMERCIAL

## 2018-05-08 ENCOUNTER — RADIANT APPOINTMENT (OUTPATIENT)
Dept: GENERAL RADIOLOGY | Facility: CLINIC | Age: 58
End: 2018-05-08
Payer: COMMERCIAL

## 2018-05-08 ENCOUNTER — RESULTS ONLY (OUTPATIENT)
Dept: OTHER | Facility: CLINIC | Age: 58
End: 2018-05-08

## 2018-05-08 ENCOUNTER — RADIANT APPOINTMENT (OUTPATIENT)
Dept: CT IMAGING | Facility: CLINIC | Age: 58
End: 2018-05-08
Attending: PHYSICIAN ASSISTANT
Payer: COMMERCIAL

## 2018-05-08 ENCOUNTER — ANTICOAGULATION THERAPY VISIT (OUTPATIENT)
Dept: ANTICOAGULATION | Facility: CLINIC | Age: 58
End: 2018-05-08

## 2018-05-08 VITALS
DIASTOLIC BLOOD PRESSURE: 86 MMHG | HEART RATE: 97 BPM | OXYGEN SATURATION: 98 % | RESPIRATION RATE: 17 BRPM | BODY MASS INDEX: 20.83 KG/M2 | HEIGHT: 64 IN | SYSTOLIC BLOOD PRESSURE: 141 MMHG | WEIGHT: 122 LBS

## 2018-05-08 DIAGNOSIS — D64.9 ANEMIA, UNSPECIFIED TYPE: ICD-10-CM

## 2018-05-08 DIAGNOSIS — Z94.2 S/P LUNG TRANSPLANT (H): ICD-10-CM

## 2018-05-08 DIAGNOSIS — D64.9 ANEMIA, UNSPECIFIED TYPE: Primary | ICD-10-CM

## 2018-05-08 DIAGNOSIS — Z94.2 LUNG TRANSPLANT RECIPIENT (H): ICD-10-CM

## 2018-05-08 DIAGNOSIS — I82.622 ACUTE DEEP VEIN THROMBOSIS (DVT) OF LEFT UPPER EXTREMITY, UNSPECIFIED VEIN (H): ICD-10-CM

## 2018-05-08 DIAGNOSIS — Z94.2 LUNG TRANSPLANT RECIPIENT (H): Primary | ICD-10-CM

## 2018-05-08 LAB
ANION GAP SERPL CALCULATED.3IONS-SCNC: 9 MMOL/L (ref 3–14)
BACTERIA SPEC CULT: NORMAL
BUN SERPL-MCNC: 23 MG/DL (ref 7–30)
CALCIUM SERPL-MCNC: 9.2 MG/DL (ref 8.5–10.1)
CHLORIDE SERPL-SCNC: 102 MMOL/L (ref 94–109)
CO2 SERPL-SCNC: 26 MMOL/L (ref 20–32)
CREAT SERPL-MCNC: 0.99 MG/DL (ref 0.52–1.04)
ERYTHROCYTE [DISTWIDTH] IN BLOOD BY AUTOMATED COUNT: 15.6 % (ref 10–15)
FOLATE SERPL-MCNC: 34.3 NG/ML
FUNGUS SPEC CULT: NORMAL
GFR SERPL CREATININE-BSD FRML MDRD: 58 ML/MIN/1.7M2
GLUCOSE SERPL-MCNC: 96 MG/DL (ref 70–99)
HCT VFR BLD AUTO: 31.3 % (ref 35–47)
HGB BLD-MCNC: 9.6 G/DL (ref 11.7–15.7)
INR PPP: 1.08 (ref 0.86–1.14)
IRON SATN MFR SERPL: 18 % (ref 15–46)
IRON SERPL-MCNC: 48 UG/DL (ref 35–180)
MAGNESIUM SERPL-MCNC: 1.6 MG/DL (ref 1.6–2.3)
MCH RBC QN AUTO: 31.4 PG (ref 26.5–33)
MCHC RBC AUTO-ENTMCNC: 30.7 G/DL (ref 31.5–36.5)
MCV RBC AUTO: 102 FL (ref 78–100)
PLATELET # BLD AUTO: 439 10E9/L (ref 150–450)
POTASSIUM SERPL-SCNC: 4.6 MMOL/L (ref 3.4–5.3)
PRA DONOR SPECIFIC ABY: NORMAL
RBC # BLD AUTO: 3.06 10E12/L (ref 3.8–5.2)
SODIUM SERPL-SCNC: 137 MMOL/L (ref 133–144)
SPECIMEN SOURCE: NORMAL
SPECIMEN SOURCE: NORMAL
TACROLIMUS BLD-MCNC: 7.3 UG/L (ref 5–15)
TIBC SERPL-MCNC: 275 UG/DL (ref 240–430)
TME LAST DOSE: 1845 H
VIT B12 SERPL-MCNC: 2247 PG/ML (ref 193–986)
WBC # BLD AUTO: 7.7 10E9/L (ref 4–11)

## 2018-05-08 PROCEDURE — G0463 HOSPITAL OUTPT CLINIC VISIT: HCPCS | Mod: ZF

## 2018-05-08 PROCEDURE — 83735 ASSAY OF MAGNESIUM: CPT | Performed by: INTERNAL MEDICINE

## 2018-05-08 PROCEDURE — 80048 BASIC METABOLIC PNL TOTAL CA: CPT | Performed by: INTERNAL MEDICINE

## 2018-05-08 PROCEDURE — 82746 ASSAY OF FOLIC ACID SERUM: CPT | Performed by: INTERNAL MEDICINE

## 2018-05-08 PROCEDURE — 86833 HLA CLASS II HIGH DEFIN QUAL: CPT | Performed by: INTERNAL MEDICINE

## 2018-05-08 PROCEDURE — 82607 VITAMIN B-12: CPT | Performed by: INTERNAL MEDICINE

## 2018-05-08 PROCEDURE — 85027 COMPLETE CBC AUTOMATED: CPT | Performed by: INTERNAL MEDICINE

## 2018-05-08 PROCEDURE — 80197 ASSAY OF TACROLIMUS: CPT | Performed by: INTERNAL MEDICINE

## 2018-05-08 PROCEDURE — 36415 COLL VENOUS BLD VENIPUNCTURE: CPT | Performed by: INTERNAL MEDICINE

## 2018-05-08 PROCEDURE — 86832 HLA CLASS I HIGH DEFIN QUAL: CPT | Performed by: INTERNAL MEDICINE

## 2018-05-08 PROCEDURE — 85610 PROTHROMBIN TIME: CPT | Performed by: INTERNAL MEDICINE

## 2018-05-08 PROCEDURE — 83550 IRON BINDING TEST: CPT | Performed by: INTERNAL MEDICINE

## 2018-05-08 PROCEDURE — 83540 ASSAY OF IRON: CPT | Performed by: INTERNAL MEDICINE

## 2018-05-08 ASSESSMENT — ENCOUNTER SYMPTOMS
SNORES LOUDLY: 0
SKIN CHANGES: 0
POSTURAL DYSPNEA: 0
POOR WOUND HEALING: 0
WHEEZING: 1
COUGH: 1
COUGH DISTURBING SLEEP: 1
SPUTUM PRODUCTION: 0
NAIL CHANGES: 0
DYSPNEA ON EXERTION: 1
SHORTNESS OF BREATH: 0
HEMOPTYSIS: 0

## 2018-05-08 ASSESSMENT — PAIN SCALES - GENERAL: PAINLEVEL: MODERATE PAIN (5)

## 2018-05-08 NOTE — LETTER
5/8/2018       RE: Kecia Blue  [address] [place] [address]     Dear Colleague,    Thank you for referring your patient, Kecia Blue, to the [facility] at [facility]. Please see a copy of my visit note below.    [facility]  May 8, 2018         Assessment and Plan:   Mrs. Kecia Blue is a 58 y/o female with h/o dermatomyositis, seronegative RA, ILD and pulmonary hypertension who was admitted for emergent lung transplant workup on 2/21. [facility] and subsequently received a bilateral lung transplant on 3/1/18. Post operative course complicated by right-sided pleural effusion s/p thoracentesis. She is seen today for routine follow up.      Next surveillance bronchoscopy: tomorrow on 5/9  Coordinator/MD: IMMANUEL/AL      1. S/p bilateral lung transplant: notes increased dry cough with a upper airway wheeze X 4 days, since she went home last weekend. Also with ongoing anterior chest wall and right sided pleuritic pain, maybe slightly improved. Sating 98% on room air. CMV 4/18 negative. S/p bronch on 4/10 without evidence of rejection (ISHLT A0B0), but did demonstrate foci of organizing pneumonia. CXR reviewed by me today demonstrates small, stable bilateral effusions. PFTs with minimal improvement compared to last visit, still below previous post transplant vest. Given new cough, wheeze and ongoing pleuritic chest pain, will obtain CT chest today. Scheduled for bronch tomorrow.  - CT chest now  - Continue immunosuppresion including mycophenolate 1500 mg BID, tacrolimus (goal 10-15) and prednisone taper  - Current prophylaxis with Bactrim, Valcyte 900 mg daily (CMV D+/R+) and nystatin    2. 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  - DSA pending for today      3. Hypomagnesemia: secondary to CNI use. Mg  of 1.6 today.   - Continue Mg oxide     4. Provoked LUE DVT: on ultrasound on 3/7 demonstrating occlusive thrombus in the left subclavian vein, axillary vein, basilic vein in the upper arm, cephalic vein near junction with the subclavian vein and cephalic vein from mid arm to the wrist. Repeat US on 4/16 with decreased subclavian thrombus and resolved axillary thrombus, but new (vs previously obscured) right IJ clot. On warfarin, goal INR 2-3.   - Coumadin on hold for bronch tomorrow  - Will need at least 3 months of AC (possibly from US on 4/16)--US  prior to discontinuation     5. High risk donor: will need 3 and 12 months labs.     6. Dermatomyositis: with likely econdary Raynauds (per patient not officially diagnosed). She has pronounced purple discoloration of the digits on her left hand and right index finger. Cold to the touch. Also notes this occurs in her toes. Was on Imuran, tacrolimus and prednisone prior to transplant.   - Has appt with Rheumatology scheduled for 5/10 to establish care     7. HTN: BP mainly 130/80s.   - Continue metoprolol 25 mg BID      RTC: 2 weeks  Influenza and other vaccinations:   Annual dermatology visit:    Ame Chow PA-C  Pulmonary, Allergy, Critical Care and Sleep Medicine         Interval History:   Notes ongoing chest wall pain, increased on the right mid/lateral chest wall with coughing, more sharp. Also notes she is coughing more for the last few days, starting after being home last weekend and outside. Non productive. Notes an upper airway wheeze that started with the cough. Has been using the Xopenex inhaler with some relief. Overall thinks the pain is getting better, not as sharp. No fever or chills, no headaches, no sinus congestion or drainage. No change in breathing, no shortness of breath other than with exertion, such as the end of exercise/walking, endurance has improved. No GI complaints, no diarrhea or constipation. Sleep is okay, appetite has improved.           Review of Systems:   Please see HPI, otherwise the complete 10 point ROS is negative.           Past Medical and Surgical History:     Past Medical History:   Diagnosis Date     Antisynthetase syndrome (H) 2014     Chronic cough      Dermatomyositis (H)      Dysplasia of cervix, low grade (ESTRADA 1)      ILD (interstitial lung disease) (H)      Osteopenia      Pulmonary hypertension      Raynaud's disease      Seronegative rheumatoid arthritis (H)      Past Surgical History:   Procedure Laterality Date     BRONCHOSCOPY (RIGID OR FLEXIBLE), DIAGNOSTIC N/A 4/10/2018    Procedure: COMBINED BRONCHOSCOPY (RIGID OR FLEXIBLE), LAVAGE;;  Surgeon: Mariposa Donohue MD;  Location:  GI     BRONCHOSCOPY FLEXIBLE N/A 3/13/2018    Procedure: BRONCHOSCOPY FLEXIBLE;  Flexible Bronchoscopy ;  Surgeon: Gissell Sanchez MD;  Location:  GI     ENT SURGERY      tonsillectomy as a child     INSERT EXTRACORPORAL MEMBRANE OXYGENATOR ADULT (OUTSIDE OR) N/A 2/27/2018    Procedure: INSERT EXTRACORPORAL MEMBRANE OXYGENATOR ADULT (OUTSIDE OR);  INSERT EXTRACORPORAL MEMBRANE OXYGENATOR ADULT (OUTSIDE OR) ;  Surgeon: Toby Hernandez MD;  Location:  OR     no prior surgery       REMOVE EXTRACORPORAL MEMBRANE OXYGENATOR ADULT N/A 3/3/2018    Procedure: REMOVE EXTRACORPORAL MEMBRANE OXYGENATOR ADULT;  Removal of Right Femoral Venous and Right Axillary Arterial Extracorporeal Membrane Oxygenator;  Surgeon: Toby Hernandez MD;  Location:  OR     TRANSPLANT LUNG RECIPIENT SINGLE X2 Bilateral 3/1/2018    Procedure: TRANSPLANT LUNG RECIPIENT SINGLE X2;  Median Sternotomy, Extracorporeal Membrane Oxygenator, bilateral sequential lung transplant;  Surgeon: Toby Hernandez MD;  Location:  OR           Family History:     Family History   Problem Relation Age of Onset     Hypertension Mother      Arthritis Mother      Pancreatic Cancer Father      DIABETES Father             Social History:     Social History  "    Social History     Marital status:      Spouse name: N/A     Number of children: N/A     Years of education: N/A     Occupational History     Not on file.     Social History Main Topics     Smoking status: Never Smoker     Smokeless tobacco: Never Used     Alcohol use No     Drug use: No     Sexual activity: Not on file     Other Topics Concern     Parent/Sibling W/ Cabg, Mi Or Angioplasty Before 65f 55m? No     Social History Narrative            Medications:     Current Outpatient Prescriptions   Medication     calcium-vitamin D (CALTRATE) 600-400 MG-UNIT per tablet     levalbuterol (XOPENEX HFA) 45 MCG/ACT Inhaler     magnesium oxide (MAG-OX) 400 MG tablet     metoprolol tartrate (LOPRESSOR) 25 MG tablet     multivitamin, therapeutic with minerals (THERA-VIT-M) TABS tablet     mycophenolate (GENERIC EQUIVALENT) 250 MG capsule     nystatin (MYCOSTATIN) 617317 UNIT/ML suspension     order for DME     pantoprazole (PROTONIX) 40 MG EC tablet     polyethylene glycol (MIRALAX/GLYCOLAX) Packet     predniSONE (DELTASONE) 5 MG tablet     sulfamethoxazole-trimethoprim (BACTRIM/SEPTRA) 400-80 MG per tablet     tacrolimus (GENERIC EQUIVALENT) 1 MG capsule     valGANciclovir (VALCYTE) 450 MG tablet     enoxaparin (LOVENOX) 60 MG/0.6ML injection     enoxaparin (LOVENOX) 60 MG/0.6ML injection     senna-docusate (SENOKOT-S;PERICOLACE) 8.6-50 MG per tablet     warfarin (COUMADIN) 1 MG tablet     No current facility-administered medications for this visit.             Physical Exam:   /86  Pulse 97  Resp 17  Ht 1.626 m (5' 4\")  Wt 55.3 kg (122 lb)  LMP 06/07/2014 (Exact Date)  SpO2 98%  BMI 20.94 kg/m2    GENERAL: alert, NAD  HEENT: NCAT, EOMI, no scleral icterus, oral mucosa moist and without lesions  Neck: no cervical or supraclavicular adenopathy  Lungs: moderate airflow with scattered expiratory wheezes  CV: RRR, S1S2, no murmurs noted  Abdomen: normoactive BS, soft, non tender  Lymph: no edema  Neuro: " AAO X 3, CN 2-12 grossly intact  Psychiatric: normal affect, good eye contact  Skin: no rash, jaundice or lesions on limited exam         Data:   All laboratory and imaging data reviewed.      Recent Results (from the past 168 hour(s))   INR    Collection Time: 05/03/18  6:53 AM   Result Value Ref Range    INR 3.31 (H) 0.86 - 1.14   Basic metabolic panel    Collection Time: 05/08/18  7:09 AM   Result Value Ref Range    Sodium 137 133 - 144 mmol/L    Potassium 4.6 3.4 - 5.3 mmol/L    Chloride 102 94 - 109 mmol/L    Carbon Dioxide 26 20 - 32 mmol/L    Anion Gap 9 3 - 14 mmol/L    Glucose 96 70 - 99 mg/dL    Urea Nitrogen 23 7 - 30 mg/dL    Creatinine 0.99 0.52 - 1.04 mg/dL    GFR Estimate 58 (L) >60 mL/min/1.7m2    GFR Estimate If Black 70 >60 mL/min/1.7m2    Calcium 9.2 8.5 - 10.1 mg/dL   Magnesium    Collection Time: 05/08/18  7:09 AM   Result Value Ref Range    Magnesium 1.6 1.6 - 2.3 mg/dL   CBC with platelets    Collection Time: 05/08/18  7:09 AM   Result Value Ref Range    WBC 7.7 4.0 - 11.0 10e9/L    RBC Count 3.06 (L) 3.8 - 5.2 10e12/L    Hemoglobin 9.6 (L) 11.7 - 15.7 g/dL    Hematocrit 31.3 (L) 35.0 - 47.0 %     (H) 78 - 100 fl    MCH 31.4 26.5 - 33.0 pg    MCHC 30.7 (L) 31.5 - 36.5 g/dL    RDW 15.6 (H) 10.0 - 15.0 %    Platelet Count 439 150 - 450 10e9/L   INR    Collection Time: 05/08/18  7:09 AM   Result Value Ref Range    INR 1.08 0.86 - 1.14   Tacrolimus level    Collection Time: 05/08/18  7:10 AM   Result Value Ref Range    Tacrolimus Last Dose 1845     Tacrolimus Level 7.3 5.0 - 15.0 ug/L   General PFT Lab (Please always keep checked)    Collection Time: 05/08/18  7:18 AM   Result Value Ref Range    FVC-Pred 3.25 L    FVC-Pre 1.53 L    FVC-%Pred-Pre 47 %    FEV1-Pre 1.17 L    FEV1-%Pred-Pre 45 %    FEV1FVC-Pred 79 %    FEV1FVC-Pre 76 %    FEFMax-Pred 6.45 L/sec    FEFMax-Pre 3.72 L/sec    FEFMax-%Pred-Pre 57 %    FEF2575-Pred 2.45 L/sec    FEF2575-Pre 0.92 L/sec    [id number]-%Pred-Pre 37 %     ExpTime-Pre 6.83 sec    FIFMax-Pre 3.21 L/sec    FEV1FEV6-Pred 81 %    FEV1FEV6-Pre 76 %     PFT interpretation:  Maneuver: valid, but ATS guidelines not met  Normal ratio with decreased FEV1 and FVC  Compared to prior: FEV1 of 1.17 is 30 cc above prior  The decrease in FVC may represent restrictive physiology.  Lung volumes would be necessary to determine.    Again, thank you for allowing me to participate in the care of your patient.      Sincerely,    Ame Chow PA-C

## 2018-05-08 NOTE — MR AVS SNAPSHOT
After Visit Summary   5/8/2018    Kecia Blue    MRN: [id number]           Patient Information     Date Of Birth          1960        Visit Information        Provider Department      5/8/2018 2:50 PM Ame Chow PA-C [facility]        Today's Diagnoses     Anemia, unspecified type    -  1    S/P lung transplant (H)          Care Instructions    Patient Instructions  1.  CT chest today  2. Call if areas on chest incision change in size, color or temperature.  3.  Call for questions or concerns    Next transplant clinic appointment: two weeks  with CXR, labs and PFTs  Next lab draw:  One week      AVS printed at time of check out    You are scheduled for a bronchoscopy on 5/9 at 1200 at the [facility] on 1st floor. Arrive 1 hour early.     Instructions     1. Nothing to eat or drink 8 hours before procedure.  2. Hold your morning medications. Bring them with you to take after the procedure.  3. Have a  available to take you home.   4. If you are a diabetic NA  5. Stop ASA 10 day before procedure.not taking  6. If you are taking coumadin you will need contact your coumadin clinic for instructions. Following with anticoagulation clinic  7. If taking medications for atrial fib NA  ~~~~~~~~~~~~~~~~~~~~~~~~~    Thoracic Transplant Office phone [phone], fax [phone]    Office Hours 8:30 - 5:00     For after-hours urgent issues, please dial [phone], and ask to speak with the Thoracic Transplant Coordinator  On-Call, pager [phone].  --------------------  To expedite your medication refill(s), please contact your pharmacy and have them fax a refill request to: [phone]  .   *Please allow 3 business days for routine medication refills.  *Please allow 5 business days for controlled substance medication refills.    **For Diabetic medications and supplies refill(s), please contact your pharmacy and  have them  Contact your Endocrine team.  --------------------  For scheduling appointments call [facility] transplant :  [phone]. For lab appointments call [phone] or [facility].  --------------------  Please Note: If you are active on [employer], all future test results will be sent by [employer] message only, and will no longer be called to patient. You may also receive communication directly from your physician.          Follow-ups after your visit        Your next 10 appointments already scheduled     May 08, 2018  8:00 PM CDT   CT CHEST W/O CONTRAST with UCCT1   [facility] CT ([facility])    [address]  1st Floor  [place] [place] [address] [phone]           Please bring any scans or X-rays taken at other hospitals, if similar tests were done. Also bring a list of your medicines, including vitamins, minerals and over-the-counter drugs. It is safest to leave personal items at home.  Be sure to tell your doctor:   If you have any allergies.   If there s any chance you are pregnant.   If you are breastfeeding.  You do not need to do anything special to prepare for this exam.  Please wear loose clothing, such as a sweat suit or jogging clothes. Avoid snaps, zippers and other metal. We may ask you to undress and put on a hospital gown.            May 09, 2018 10:00 AM CDT   Pulmonary Treatment with UR PULMONARY REHAB 2   [facility] [place], Cardiac Rehabilitation ([facility])    [address] 1st Floor [address]  [facility] [place] [address] [phone]            May 09, 2018   Procedure with Wilber Lin MD   [facility]    [address] [id number] [phone]           The [facility] is located on the corner of [address] and [address] on the [address]. It is easily accessible from virtually any point in the [place] area, via [address] and [address].            May 10, 2018  3:30 PM CDT   (Arrive by 3:15 PM)   New Patient Visit with Franklyn Jolly MD   [facility] Rheumatology ([facility])    [address]  [address]  [place] [address]   [phone]            May 11, 2018 10:00 AM CDT   Pulmonary Treatment with UR PULMONARY REHAB 2   [facility], Cardiac Rehabilitation ([facility])    [address] [place] [address] 1st Floor [address] [place] [address]   [phone]            May 15, 2018  7:00 AM CDT   Lab with [facility]   [facility] Lab ([facility])    [address] [address]   [phone]            May 16, 2018 10:00 AM CDT   Pulmonary Treatment with UR PULMONARY REHAB 2   [facility], Cardiac Rehabilitation ([facility])    [address] [place] [address] [address] [address]   [phone]            May 18, 2018 10:00 AM CDT   Pulmonary Treatment with UR PULMONARY REHAB 2   [facility], Cardiac Rehabilitation ([facility])    [address] [place] [address] 1st Floor [address] [address]   [phone]            May 21, 2018  7:15 AM CDT   XR CHEST 2 VIEWS with UCXR1   [facility] Xray ([facility])    [address] [place] [address]   [phone]           Please bring a list of your current medicines to your exam. (Include vitamins, minerals and over-thecounter medicines.) Leave your valuables at home.  Tell your doctor if there is a chance you may be pregnant.  You do not need to do anything special for this exam.            May 21, 2018  7:30 AM CDT   PFT VISIT with  PFL LLUVIA   [facility] Pulmonary Function  Testing ([facility])    [address]  3rd Floor  [facility] [id number] [phone]              Future tests that were ordered for you today     Open Future Orders        Priority Expected Expires Ordered    Tacrolimus level Routine 5/15/2018 5/29/2018 5/8/2018    PRA Donor Specific Antibody Routine 5/15/2018 5/29/2018 5/8/2018    Basic metabolic panel Routine 5/15/2018 5/29/2018 5/8/2018    Magnesium Routine 5/15/2018 5/29/2018 5/8/2018    CBC with platelets Routine 5/15/2018 5/29/2018 5/8/2018    CMV DNA quantification Routine 5/15/2018 5/29/2018 5/8/2018    X-ray Chest 2 vws* Routine 5/15/2018 5/29/2018 5/8/2018    Spirometry, Breathing Capacity Routine 5/15/2018 5/29/2018 5/8/2018    INR Routine 5/15/2018 5/29/2018 5/8/2018            Who to contact     If you have questions or need follow up information about today's clinic visit or your schedule please contact [facility] directly at [phone].  Normal or non-critical lab and imaging results will be communicated to you by [employer], letter or phone within 4 business days after the clinic has received the results. If you do not hear from us within 7 days, please contact the clinic through [employer] or phone. If you have a critical or abnormal lab result, we will notify you by phone as soon as possible.  Submit refill requests through [employer] or call your pharmacy and they will forward the refill request to us. Please allow 3 business days for your refill to be completed.          Additional Information About Your Visit        [employer] Information     [employer] gives you secure access to your electronic health record. If you see a primary care provider, you can also send messages to your care team and make appointments. If you have questions, please call your primary care clinic.  If you do not have a primary care provider, please call [phone] and they will assist you.        Care  "EveryWhere ID     This is your Care EveryWhere ID. This could be used by other organizations to access your [place] medical records  [id number]        Your Vitals Were     Pulse Respirations Height Last Period Pulse Oximetry BMI (Body Mass Index)    97 17 1.626 m (5' 4\") 06/07/2014 (Exact Date) 98% 20.94 kg/m2       Blood Pressure from Last 3 Encounters:   05/08/18 141/86   04/30/18 142/90   04/16/18 123/86    Weight from Last 3 Encounters:   05/08/18 55.3 kg (122 lb)   05/04/18 55.8 kg (123 lb)   05/02/18 55.3 kg (122 lb)               Primary Care Provider Office Phone # Fax #    Rosy Vu [phone]       [facility] [place]  [address]  [facility] [address]        Equal Access to Services     OLIVA [facility] : Hadii aad ku hadasho Soomaali, waaxda luqadaha, qaybta kaalmada adeegyada, morro daily . So [facility] [phone].    ATENCIÓN: Si habla español, tiene a taylor disposición servicios gratuitos de asistencia lingüística. Ll[facility] [phone].    We comply with applicable federal civil rights laws and [place] laws. We do not discriminate on the basis of race, color, national origin, age, disability, sex, sexual orientation, or gender identity.            Thank you!     Thank you for choosing [facility]  for your care. Our goal is always to provide you with excellent care. Hearing back from our patients is one way we can continue to improve our services. Please take a few minutes to complete the written survey that you may receive in the mail after your visit with us. Thank you!             Your Updated Medication List - Protect others around you: Learn how to safely use, store and throw away your medicines at www.disposemymeds.org.          This list is accurate as of 5/8/18  6:39 PM.  Always use your most recent med list.                   Brand Name Dispense Instructions for use Diagnosis    calcium-vitamin D 600-400 " MG-UNIT per tablet    CALTRATE    60 tablet    Take 1 tablet by mouth 2 times daily (with meals)    S/P lung transplant (H)       * enoxaparin 60 MG/0.6ML injection    LOVENOX    10 Syringe    Inject 60 mg subcutaneously every 12 hours as directed by Anticoagulation Clinic    Acute deep vein thrombosis (DVT) of left upper extremity, unspecified vein (H), Lung transplant recipient (H)       * enoxaparin 60 MG/0.6ML injection    LOVENOX    25 Syringe    Inject 60 mg subcutaneously every 12 hours or as directed by Anticoagulation Clinic.    Acute deep vein thrombosis (DVT) of left upper extremity, unspecified vein (H), Lung transplant recipient (H)       levalbuterol 45 MCG/ACT Inhaler    XOPENEX HFA    1 Inhaler    Inhale 2 puffs into the lungs every 6 hours as needed for shortness of breath / dyspnea or wheezing    S/P lung transplant (H), Other constipation       magnesium oxide 400 MG tablet    MAG-OX    30 tablet    Take 1 tablet (400 mg) by mouth daily    Hypomagnesemia       metoprolol tartrate 25 MG tablet    LOPRESSOR    60 tablet    Take 1 tablet (25 mg) by mouth 2 times daily    Paroxysmal atrial fibrillation (H)       multivitamin, therapeutic with minerals Tabs tablet     30 each    Take 1 tablet by mouth daily    Lung transplant recipient (H)       mycophenolate 250 MG capsule    GENERIC EQUIVALENT    360 capsule    Take 6 capsules (1,500 mg) by mouth 2 times daily    S/P lung transplant (H)       nystatin 902064 UNIT/ML suspension    MYCOSTATIN    1200 mL    Take 10 mLs (1,000,000 Units) by mouth 4 times daily    Lung transplant recipient (H)       order for DME     1 Device    Equipment being ordered: Oxygen 5 liters at rest, 15 liters with exertion.  Needs portability.  Please provide 2 10-liter concentrators for in the home    ILD (interstitial lung disease) (H), Hypoxia       pantoprazole 40 MG EC tablet    PROTONIX    30 tablet    Take 1 tablet (40 mg) by mouth every morning    Gastroesophageal  reflux disease without esophagitis       polyethylene glycol Packet    MIRALAX/GLYCOLAX    60 packet    Take 17 g by mouth 2 times daily as needed for constipation    Other constipation       predniSONE 5 MG tablet    DELTASONE    300 tablet    Take 10 mg twice daily until 5/24, follow taper    S/P lung transplant (H)       senna-docusate 8.6-50 MG per tablet    SENOKOT-S;PERICOLACE    100 tablet    Take 2 tablets by mouth 2 times daily as needed for constipation    Other constipation, S/P lung transplant (H)       sulfamethoxazole-trimethoprim 400-80 MG per tablet    BACTRIM/SEPTRA    30 tablet    1 tablet by Oral or NG Tube route daily    Organ transplant candidate, Lung disease, interstitial (H), Abnormal chest CT, Hypotension, unspecified hypotension type, Acute on chronic respiratory failure with hypoxia (H), ILD (interstitial lung disease) (H)       tacrolimus 1 MG capsule    GENERIC EQUIVALENT    300 capsule    Take 5 capsules (5 mg) by mouth 2 times daily    S/P lung transplant (H), Other constipation       valGANciclovir 450 MG tablet    VALCYTE    60 tablet    Take 2 tablets (900 mg) by mouth daily    S/P lung transplant (H)       warfarin 1 MG tablet    COUMADIN    140 tablet    Take 4mg MWFSat and 6mg TuThSun, or as directed by the Medication Monitoring Clinic at the [facility].    DVT (deep venous thrombosis) (H), Status post lung transplantation (H)       * Notice:  This list has 2 medication(s) that are the same as other medications prescribed for you. Read the directions carefully, and ask your doctor or other care provider to review them with you.

## 2018-05-08 NOTE — PROGRESS NOTES
Holding for bronch on 5/9/18; no call made today.  Will follow up with Kecia after bronch.  Anju Meyers RN

## 2018-05-08 NOTE — PATIENT INSTRUCTIONS
Patient Instructions  1.  CT chest today  2. Call if areas on chest incision change in size, color or temperature.  3.  Call for questions or concerns    Next transplant clinic appointment: two weeks  with CXR, labs and PFTs  Next lab draw:  One week      AVS printed at time of check out    You are scheduled for a bronchoscopy on 5/9 at 1200 at the [facility] on 1st floor. Arrive 1 hour early.     Instructions     1. Nothing to eat or drink 8 hours before procedure.  2. Hold your morning medications. Bring them with you to take after the procedure.  3. Have a  available to take you home.   4. If you are a diabetic NA  5. Stop ASA 10 day before procedure.not taking  6. If you are taking coumadin you will need contact your coumadin clinic for instructions. Following with anticoagulation clinic  7. If taking medications for atrial fib NA  ~~~~~~~~~~~~~~~~~~~~~~~~~    Thoracic Transplant Office phone [phone], fax [phone]    Office Hours 8:30 - 5:00     For after-hours urgent issues, please dial [phone], and ask to speak with the Thoracic Transplant Coordinator  On-Call, pager [phone].  --------------------  To expedite your medication refill(s), please contact your pharmacy and have them fax a refill request to: [phone]  .   *Please allow 3 business days for routine medication refills.  *Please allow 5 business days for controlled substance medication refills.    **For Diabetic medications and supplies refill(s), please contact your pharmacy and have them  Contact your Endocrine team.  --------------------  For scheduling appointments call [facility] transplant :  [phone]. For lab appointments call [phone] or [facility].  --------------------  Please Note: If you are active on [employer], all future test results will be sent by [employer] message only, and will no longer be called to patient. You may also receive communication directly from your  physician.

## 2018-05-08 NOTE — MR AVS SNAPSHOT
Kecia Blue   5/8/2018   Anticoagulation Therapy Visit    Description:  57 year old female   Provider:  Anju Meyers RN   Department:  [facility]           INR as of 5/8/2018     Today's INR       Anticoagulation Summary as of 5/8/2018     INR goal 2.0-3.0   Today's INR    Full instructions 5/8: Hold; Otherwise No maintenance plan   Next INR check 5/9/2018    Indications   Lung transplant recipient (H) [Z94.2]  Deep vein thrombosis (DVT) (H) [I82.409] [I82.409]         May 2018 Details    Sun Mon Tue Wed Thu Fri Sat       1               2               3               4               5                 6               7               8      Hold   See details      9            10               11               12                 13               14               15               16               17               18               19                 20               21               22               23               24               25               26                 27               28               29               30               31                  Date Details   05/08 This INR check       Date of next INR:  5/9/2018         How to take your warfarin dose     Hold Do not take your warfarin dose. See the Details table to the right for additional instructions.

## 2018-05-09 ENCOUNTER — SURGERY (OUTPATIENT)
Age: 58
End: 2018-05-09

## 2018-05-09 ENCOUNTER — HOSPITAL ENCOUNTER (OUTPATIENT)
Facility: CLINIC | Age: 58
Discharge: HOME | End: 2018-05-09
Attending: INTERNAL MEDICINE | Admitting: INTERNAL MEDICINE
Payer: COMMERCIAL

## 2018-05-09 ENCOUNTER — TELEPHONE (OUTPATIENT)
Dept: TRANSPLANT | Facility: CLINIC | Age: 58
End: 2018-05-09

## 2018-05-09 ENCOUNTER — ANTICOAGULATION THERAPY VISIT (OUTPATIENT)
Dept: ANTICOAGULATION | Facility: CLINIC | Age: 58
End: 2018-05-09

## 2018-05-09 VITALS
SYSTOLIC BLOOD PRESSURE: 135 MMHG | OXYGEN SATURATION: 94 % | DIASTOLIC BLOOD PRESSURE: 89 MMHG | RESPIRATION RATE: 20 BRPM

## 2018-05-09 DIAGNOSIS — Z94.2 LUNG TRANSPLANT RECIPIENT (H): ICD-10-CM

## 2018-05-09 DIAGNOSIS — Z94.2 S/P LUNG TRANSPLANT (H): ICD-10-CM

## 2018-05-09 DIAGNOSIS — K59.09 OTHER CONSTIPATION: ICD-10-CM

## 2018-05-09 DIAGNOSIS — I82.622 ACUTE DEEP VEIN THROMBOSIS (DVT) OF LEFT UPPER EXTREMITY, UNSPECIFIED VEIN (H): ICD-10-CM

## 2018-05-09 LAB
BRONCHOSCOPY: NORMAL
CMV DNA SPEC NAA+PROBE-ACNC: NORMAL [IU]/ML
CMV DNA SPEC NAA+PROBE-LOG#: NORMAL {LOG_IU}/ML
SPECIMEN SOURCE: NORMAL

## 2018-05-09 PROCEDURE — 25000128 H RX IP 250 OP 636: Performed by: INTERNAL MEDICINE

## 2018-05-09 PROCEDURE — G0500 MOD SEDAT ENDO SERVICE >5YRS: HCPCS | Performed by: INTERNAL MEDICINE

## 2018-05-09 PROCEDURE — 25000125 ZZHC RX 250: Performed by: INTERNAL MEDICINE

## 2018-05-09 PROCEDURE — 31622 DX BRONCHOSCOPE/WASH: CPT | Performed by: INTERNAL MEDICINE

## 2018-05-09 RX ORDER — LIDOCAINE HYDROCHLORIDE 40 MG/ML
INJECTION, SOLUTION RETROBULBAR PRN
Status: DISCONTINUED | OUTPATIENT
Start: 2018-05-09 | End: 2018-05-09 | Stop reason: HOSPADM

## 2018-05-09 RX ORDER — TACROLIMUS 1 MG/1
CAPSULE ORAL
Qty: 330 CAPSULE | Refills: 11 | Status: SHIPPED | OUTPATIENT
Start: 2018-05-09 | End: 2018-06-06

## 2018-05-09 RX ORDER — FENTANYL CITRATE 50 UG/ML
INJECTION, SOLUTION INTRAMUSCULAR; INTRAVENOUS PRN
Status: DISCONTINUED | OUTPATIENT
Start: 2018-05-09 | End: 2018-05-09 | Stop reason: HOSPADM

## 2018-05-09 RX ORDER — LIDOCAINE HYDROCHLORIDE AND EPINEPHRINE 10; 10 MG/ML; UG/ML
INJECTION, SOLUTION INFILTRATION; PERINEURAL PRN
Status: DISCONTINUED | OUTPATIENT
Start: 2018-05-09 | End: 2018-05-09 | Stop reason: HOSPADM

## 2018-05-09 RX ADMIN — MIDAZOLAM 0.5 MG: 1 INJECTION INTRAMUSCULAR; INTRAVENOUS at 12:22

## 2018-05-09 RX ADMIN — MIDAZOLAM 0.5 MG: 1 INJECTION INTRAMUSCULAR; INTRAVENOUS at 12:27

## 2018-05-09 RX ADMIN — MIDAZOLAM 0.5 MG: 1 INJECTION INTRAMUSCULAR; INTRAVENOUS at 12:24

## 2018-05-09 RX ADMIN — LIDOCAINE HYDROCHLORIDE 9 ML: 40 INJECTION, SOLUTION RETROBULBAR; TOPICAL at 12:30

## 2018-05-09 RX ADMIN — LIDOCAINE HYDROCHLORIDE,EPINEPHRINE BITARTRATE 5 ML: 10; .01 INJECTION, SOLUTION INFILTRATION; PERINEURAL at 12:30

## 2018-05-09 RX ADMIN — FENTANYL CITRATE 25 MCG: 50 INJECTION, SOLUTION INTRAMUSCULAR; INTRAVENOUS at 12:24

## 2018-05-09 RX ADMIN — FENTANYL CITRATE 25 MCG: 50 INJECTION, SOLUTION INTRAMUSCULAR; INTRAVENOUS at 12:29

## 2018-05-09 RX ADMIN — LIDOCAINE HYDROCHLORIDE 15 ML: 10 INJECTION, SOLUTION EPIDURAL; INFILTRATION; INTRACAUDAL; PERINEURAL at 12:26

## 2018-05-09 RX ADMIN — FENTANYL CITRATE 25 MCG: 50 INJECTION, SOLUTION INTRAMUSCULAR; INTRAVENOUS at 12:22

## 2018-05-09 NOTE — PROGRESS NOTES
ANTICOAGULATION FOLLOW-UP CLINIC VISIT    Patient Name:  Kecia Blue  Date:  5/9/2018  Contact Type:  Telephone    SUBJECTIVE:     Patient Findings     Comments Pt reports having a successful bronch today and MD doing the procedure tells pt she is not at risk for bleeding. Instructed pt to restart Lovenox 60mg Q 12 Hours tonight            OBJECTIVE    INR   Date Value Ref Range Status   05/08/2018 1.08 0.86 - 1.14 Final       ASSESSMENT / PLAN  No question data found.  Anticoagulation Summary as of 5/9/2018     INR goal 2.0-3.0   Today's INR 1.08! (5/8/2018)   Maintenance plan No maintenance plan   Full instructions 5/9: 6 mg; 5/10: 4 mg; 5/11: 6 mg; 5/12: 4 mg; 5/13: 4 mg; 5/14: 4 mg; Otherwise No maintenance plan   Next INR check 5/15/2018   Priority INR   Target end date     Indications   Lung transplant recipient (H) [Z94.2]  Deep vein thrombosis (DVT) (H) [I82.409] [I82.409]         Anticoagulation Episode Summary     INR check location Clinic Lab    Preferred lab     Send INR reminders to [facility] CL[facility]    Comments Plan for 3 months of therapy- Provoked DVT. Welcome package sent . Patient staying at [facility] HIPPA OK and  Verbal OK to speak with  and leave messages on Cell [phone]      Anticoagulation Care Providers     Provider Role Specialty Phone number    Ame Chow PA-C Responsible Pulmonary [phone]            See the Encounter Report to view Anticoagulation Flowsheet and Dosing Calendar (Go to Encounters tab in chart review, and find the Anticoagulation Therapy Visit)    Spoke with patient. Gave them their lab results and new warfarin recommendation.  No changes in health, medication, or diet. No missed doses, no falls. No signs or symptoms of bleed or clotting.     Ofe Redmond RN

## 2018-05-09 NOTE — DISCHARGE INSTRUCTIONS
Discharge Instructions after Bronchoscopy    Activity  ___ You had medicine to relax and for pain. You may feel dizzy or sleepy.  For 24 hours:    Do not drive or use heavy equipment.    Do not make important decisions.    Do not drink any alcohol.    Diet  ___ When you can swallow easily, you may go back to your regular diet, medicines  and light exercise.    Follow-up  ___ We took small tissue or fluid samples to study. We will call you with the results in about 10 business days.    Call right away if you have:    Unusual chest pain    Temperature above 100.6  F (37.5  C)    Coughing that does not stop.    If you have severe pain, bleeding, or shortness of breath, go to an emergency room.    If you have questions, call:  Monday to Friday, 7 a.m. to 4:30 p.m.  Endoscopy: [phone] (We may have to call you back)    After hours:  Hospital: [phone] (Ask for the pulmonary fellow on call)

## 2018-05-09 NOTE — IP AVS SNAPSHOT
MRN:[id number]                      After Visit Summary   5/9/2018    Kecia Blue    MRN: [id number]           Thank you!     Thank you for choosing [facility] for your care. Our goal is always to provide you with excellent care. Hearing back from our patients is one way we can continue to improve our services. Please take a few minutes to complete the written survey that you may receive in the mail after you visit with us. Thank you!        Patient Information     Date Of Birth          1960        About your hospital stay     You were admitted on:  May 9, 2018 You last received care in the:  [facility], Endoscopy    You were discharged on:  May 9, 2018       Who to Call     For medical emergencies, please call 911.  For non-urgent questions about your medical care, please call your primary care provider or clinic, [phone]  For questions related to your surgery, please call your surgery clinic        Attending Provider     Provider Michell Lin, Wilber Warner MD Pulmonary       Primary Care Provider Office Phone # Fax #    Rosy Vu [phone]      Your next 10 appointments already scheduled     May 10, 2018  3:30 PM CDT   (Arrive by 3:15 PM)   New Patient Visit with Franklyn Jolly MD   [facility] ([facility])    [address] [place] [address]  [address]  [facility] [id number] [phone]            May 11, 2018 10:00 AM CDT   Pulmonary Treatment with  PULMONARY REHAB 2   [facility], Cardiac Rehabilitation ([facility])    [address]  [facility] [address] [phone]            May 15, 2018  7:00 AM CDT   Lab with  LAB    [facility] ([facility])    [address] [place] [address]  [address] [facility] [id number] [phone]            May 16, 2018 10:00 AM CDT   Pulmonary Treatment  with UR PULMONARY REHAB 2   [facility], Cardiac Rehabilitation ([facility])    [address]  [facility] [address]   [phone]            May 18, 2018 10:00 AM CDT   Pulmonary Treatment with UR PULMONARY REHAB 2   [facility], Cardiac Rehabilitation ([facility])    [address]  [facility] [address]   [phone]            May 21, 2018  7:00 AM CDT   Lab with  LAB   [facility] ([facility])    [address] [place] [address] [phone]            May 21, 2018  7:15 AM CDT   XR CHEST 2 VIEWS with XR1   [facility] Xray ([facility])    [address] [place] [id number] [phone]           Please bring a list of your current medicines to your exam. (Include vitamins, minerals and over-thecounter medicines.) Leave your valuables at home.  Tell your doctor if there is a chance you may be pregnant.  You do not need to do anything special for this exam.            May 21, 2018  7:30 AM CDT   PFT VISIT with  PFL C   [facility] Pulmonary Function Testing ([facility])    [address] [place] [address] [phone]            May 21, 2018  8:00 AM CDT   (Arrive by 7:45 AM)   Return Lung Transplant with Alex Hale MD   [facility] Solid Organ Transplant ([facility])    [address] [place] [address] [phone]            May 23, 2018 10:00 AM CDT   Pulmonary Treatment with UR PULMONARY REHAB 2   [facility], Cardiac Rehabilitation ([facility])    [address]  [facility] [address]   [phone]               Further instructions from your care team       Discharge Instructions after Bronchoscopy    Activity  ___ You had medicine to relax and for pain. You may feel dizzy or sleepy.  For 24 hours:    Do not drive or use heavy equipment.    Do not make important decisions.    Do not drink any alcohol.    Diet  ___ When you can swallow easily, you may go back to your regular diet, medicines  and light exercise.    Follow-up  ___ We took small tissue or fluid samples to study. We will call you with the results in about 10 business days.    Call right away if you have:    Unusual chest pain    Temperature above 100.6  F (37.5  C)    Coughing that does not stop.    If you have severe pain, bleeding, or shortness of breath, go to an emergency room.    If you have questions, call:  Monday to Friday, 7 a.m. to 4:30 p.m.  Endoscopy: [phone] (We may have to call you back)    After hours:  Hospital: [phone] (Ask for the pulmonary fellow on call)    Pending Results     No orders found from 5/7/2018 to 5/10/2018.            Admission Information     Date & Time Provider Department Dept. Phone    5/9/2018 Wilber Lin MD [facility], [place], Endoscopy [phone]      Your Vitals Were     Blood Pressure Respirations Last Period Pulse Oximetry          143/89 20 06/07/2014 (Exact Date) 97%        MyChart Information     [employer] gives you secure access to your electronic health record. If you see a primary care provider, you can also send messages to your care team and make appointments. If you have questions, please call your primary care clinic.  If you do not have a primary care provider, please call [phone] and they will assist you.        Care EveryWhere ID     This is your Care EveryWhere ID. This could be used by other organizations to access your [place] medical records  [id number]        Equal Access to Services     ALBAN VALENCIA : Sahara Lucas, yasmin sierra, lakshmi sierra  morro vazkelli iqbal'aan ah. So [facility] [phone].    ATENCIÓN: Si kylela jordana, tiene a taylor disposición servicios gratuitos de asistencia lingüística. Zachary al [phone].    We comply with applicable federal civil rights laws and [place] laws. We do not discriminate on the basis of race, color, national origin, age, disability, sex, sexual orientation, or gender identity.               Review of your medicines      UNREVIEWED medicines. Ask your doctor about these medicines        Dose / Directions    calcium-vitamin D 600-400 MG-UNIT per tablet   Commonly known as:  CALTRATE   Used for:  S/P lung transplant (H)        Dose:  1 tablet   Take 1 tablet by mouth 2 times daily (with meals)   Quantity:  60 tablet   Refills:  1       * enoxaparin 60 MG/0.6ML injection   Commonly known as:  LOVENOX   Used for:  Acute deep vein thrombosis (DVT) of left upper extremity, unspecified vein (H), Lung transplant recipient (H)        Inject 60 mg subcutaneously every 12 hours as directed by Anticoagulation Clinic   Quantity:  10 Syringe   Refills:  0       * enoxaparin 60 MG/0.6ML injection   Commonly known as:  LOVENOX   Used for:  Acute deep vein thrombosis (DVT) of left upper extremity, unspecified vein (H), Lung transplant recipient (H)        Inject 60 mg subcutaneously every 12 hours or as directed by Anticoagulation Clinic.   Quantity:  25 Syringe   Refills:  1       levalbuterol 45 MCG/ACT Inhaler   Commonly known as:  XOPENEX HFA   Used for:  S/P lung transplant (H), Other constipation        Dose:  2 puff   Inhale 2 puffs into the lungs every 6 hours as needed for shortness of breath / dyspnea or wheezing   Quantity:  1 Inhaler   Refills:  1       magnesium oxide 400 MG tablet   Commonly known as:  MAG-OX   Used for:  Hypomagnesemia        Dose:  400 mg   Take 1 tablet (400 mg) by mouth daily   Quantity:  30 tablet   Refills:  1       metoprolol tartrate 25 MG tablet   Commonly known as:   LOPRESSOR   Used for:  Paroxysmal atrial fibrillation (H)        Dose:  25 mg   Take 1 tablet (25 mg) by mouth 2 times daily   Quantity:  60 tablet   Refills:  1       multivitamin, therapeutic with minerals Tabs tablet   Used for:  Lung transplant recipient (H)        Dose:  1 tablet   Take 1 tablet by mouth daily   Quantity:  30 each   Refills:  11       mycophenolate 250 MG capsule   Commonly known as:  GENERIC EQUIVALENT   Used for:  S/P lung transplant (H)        Dose:  1500 mg   Take 6 capsules (1,500 mg) by mouth 2 times daily   Quantity:  360 capsule   Refills:  11       nystatin 098971 UNIT/ML suspension   Commonly known as:  MYCOSTATIN   Used for:  Lung transplant recipient (H)        Dose:  10 mL   Take 10 mLs (1,000,000 Units) by mouth 4 times daily   Quantity:  1200 mL   Refills:  5       pantoprazole 40 MG EC tablet   Commonly known as:  PROTONIX   Used for:  Gastroesophageal reflux disease without esophagitis        Dose:  40 mg   Take 1 tablet (40 mg) by mouth every morning   Quantity:  30 tablet   Refills:  1       polyethylene glycol Packet   Commonly known as:  MIRALAX/GLYCOLAX   Used for:  Other constipation        Dose:  17 g   Take 17 g by mouth 2 times daily as needed for constipation   Quantity:  60 packet   Refills:  0       predniSONE 5 MG tablet   Commonly known as:  DELTASONE   Used for:  S/P lung transplant (H)        Take 10 mg twice daily until 5/24, follow taper   Quantity:  300 tablet   Refills:  3       senna-docusate 8.6-50 MG per tablet   Commonly known as:  SENOKOT-S;PERICOLACE   Used for:  Other constipation, S/P lung transplant (H)        Dose:  2 tablet   Take 2 tablets by mouth 2 times daily as needed for constipation   Quantity:  100 tablet   Refills:  0       sulfamethoxazole-trimethoprim 400-80 MG per tablet   Commonly known as:  BACTRIM/SEPTRA   Indication:  Preventative Medication Therapy Used Around Surgery   Used for:  Organ transplant candidate, Lung disease,  interstitial (H), Abnormal chest CT, Hypotension, unspecified hypotension type, Acute on chronic respiratory failure with hypoxia (H), ILD (interstitial lung disease) (H)        Dose:  1 tablet   1 tablet by Oral or NG Tube route daily   Quantity:  30 tablet   Refills:  11       tacrolimus 1 MG capsule   Commonly known as:  GENERIC EQUIVALENT   Used for:  S/P lung transplant (H), Other constipation        Dose:  5 mg   Take 5 capsules (5 mg) by mouth 2 times daily   Quantity:  300 capsule   Refills:  11       valGANciclovir 450 MG tablet   Commonly known as:  VALCYTE   Indication:  Medication Treatment to Prevent Cytomegalovirus Disease   Used for:  S/P lung transplant (H)        Dose:  900 mg   Take 2 tablets (900 mg) by mouth daily   Quantity:  60 tablet   Refills:  6       warfarin 1 MG tablet   Commonly known as:  COUMADIN   Used for:  DVT (deep venous thrombosis) (H), Status post lung transplantation (H)        Take 4mg MWFSat and 6mg TuThSun, or as directed by the [facility].   Quantity:  140 tablet   Refills:  2       * Notice:  This list has 2 medication(s) that are the same as other medications prescribed for you. Read the directions carefully, and ask your doctor or other care provider to review them with you.      CONTINUE these medicines which have NOT CHANGED        Dose / Directions    order for DME   Used for:  ILD (interstitial lung disease) (H), Hypoxia        Equipment being ordered: Oxygen 5 liters at rest, 15 liters with exertion.  Needs portability.  Please provide 2 10-liter concentrators for in the home   Quantity:  1 Device   Refills:  prn                Protect others around you: Learn how to safely use, store and throw away your medicines at www.disposemymeds.org.             Medication List: This is a list of all your medications and when to take them. Check marks below indicate your daily home schedule. Keep this list as a reference.      Medications            Morning Afternoon Evening Bedtime As Needed    calcium-vitamin D 600-400 MG-UNIT per tablet   Commonly known as:  CALTRATE   Take 1 tablet by mouth 2 times daily (with meals)                                * enoxaparin 60 MG/0.6ML injection   Commonly known as:  LOVENOX   Inject 60 mg subcutaneously every 12 hours as directed by Anticoagulation Clinic                                * enoxaparin 60 MG/0.6ML injection   Commonly known as:  LOVENOX   Inject 60 mg subcutaneously every 12 hours or as directed by Anticoagulation Clinic.                                levalbuterol 45 MCG/ACT Inhaler   Commonly known as:  XOPENEX HFA   Inhale 2 puffs into the lungs every 6 hours as needed for shortness of breath / dyspnea or wheezing                                magnesium oxide 400 MG tablet   Commonly known as:  MAG-OX   Take 1 tablet (400 mg) by mouth daily                                metoprolol tartrate 25 MG tablet   Commonly known as:  LOPRESSOR   Take 1 tablet (25 mg) by mouth 2 times daily                                multivitamin, therapeutic with minerals Tabs tablet   Take 1 tablet by mouth daily                                mycophenolate 250 MG capsule   Commonly known as:  GENERIC EQUIVALENT   Take 6 capsules (1,500 mg) by mouth 2 times daily                                nystatin [id number] UNIT/ML suspension   Commonly known as:  MYCOSTATIN   Take 10 mLs (1,000,000 Units) by mouth 4 times daily                                order for DME   Equipment being ordered: Oxygen 5 liters at rest, 15 liters with exertion.  Needs portability.  Please provide 2 10-liter concentrators for in the home                                pantoprazole 40 MG EC tablet   Commonly known as:  PROTONIX   Take 1 tablet (40 mg) by mouth every morning                                polyethylene glycol Packet   Commonly known as:  MIRALAX/GLYCOLAX   Take 17 g by mouth 2 times daily as needed for constipation                                 predniSONE 5 MG tablet   Commonly known as:  DELTASONE   Take 10 mg twice daily until 5/24, follow taper                                senna-docusate 8.6-50 MG per tablet   Commonly known as:  SENOKOT-S;PERICOLACE   Take 2 tablets by mouth 2 times daily as needed for constipation                                sulfamethoxazole-trimethoprim 400-80 MG per tablet   Commonly known as:  BACTRIM/SEPTRA   1 tablet by Oral or NG Tube route daily                                tacrolimus 1 MG capsule   Commonly known as:  GENERIC EQUIVALENT   Take 5 capsules (5 mg) by mouth 2 times daily                                valGANciclovir 450 MG tablet   Commonly known as:  VALCYTE   Take 2 tablets (900 mg) by mouth daily                                warfarin 1 MG tablet   Commonly known as:  COUMADIN   Take 4mg MWFSat and 6mg TuThSun, or as directed by the [facility]                                * Notice:  This list has 2 medication(s) that are the same as other medications prescribed for you. Read the directions carefully, and ask your doctor or other care provider to review them with you.

## 2018-05-09 NOTE — IP AVS SNAPSHOT
[facility] [place], Endoscopy    [address] [facility] [address]    Phone:  [phone]                                       After Visit Summary   5/9/2018    Kecia Blue    MRN: [id number]           After Visit Summary Signature Page     I have received my discharge instructions, and my questions have been answered. I have discussed any challenges I see with this plan with the nurse or doctor.    ..........................................................................................................................................  Patient/Patient Representative Signature      ..........................................................................................................................................  Patient Representative Print Name and Relationship to Patient    ..................................................               ................................................  Date                                            Time    ..........................................................................................................................................  Reviewed by Signature/Title    ...................................................              ..............................................  Date                                                            Time

## 2018-05-09 NOTE — MR AVS SNAPSHOT
Kecia Blue   5/9/2018   Anticoagulation Therapy Visit    Description:  57 year old female   Provider:  Ofe Redmond, RN   [facility]           INR as of 5/9/2018     Today's INR 1.08! (5/8/2018)      Anticoagulation Summary as of 5/9/2018     INR goal 2.0-3.0   Today's INR 1.08! (5/8/2018)   Full instructions 5/9: 6 mg; 5/10: 4 mg; 5/11: 6 mg; 5/12: 4 mg; 5/13: 4 mg; 5/14: 4 mg; Otherwise No maintenance plan   Next INR check 5/15/2018    Indications   Lung transplant recipient (H) [Z94.2]  Deep vein thrombosis (DVT) (H) [I82.409] [I82.409]         May 2018 Details    Sun Mon Tue Wed Thu Fri Sat       1               2               3               4               5                 6               7               8               9      6 mg   See details      10      4 mg         11      6 mg         12      4 mg           13      4 mg         14      4 mg         15            16               17               18               19                 20               21               22               23               24               25               26                 27               28               29               30               31                  Date Details   05/09 This INR check       Date of next INR:  5/15/2018         How to take your warfarin dose     To take:  4 mg Take 4 of the 1 mg tablets.    To take:  6 mg Take 6 of the 1 mg tablets.

## 2018-05-09 NOTE — TELEPHONE ENCOUNTER
Tacrolimus level 7.3 at 12.5 hours, on 5/8  Goal 10-15.   Current dose 5 mg in AM, 5 mg in PM    Dose changed to  5 mg in AM, 6 mg in PM   Recheck level in 7 days    Dr. Lin did her bronch today and that is what he said via staff message:  bronch'ed her today but did not do biopsies due to right anastomosis friability where she has significant sloughing. There was oozing from the BI mucosa/slough even with touching the tip of scope.   I am hoping this will peel off itself. We can not do dilation at this point due to underlying friable mucosa.     We should do bronch in a month for airway exam and BAL/biopsies from the L side if the right side is still friable.    Plan is to follow up in 2 week and repeat bronch in 1 month.

## 2018-05-10 ENCOUNTER — OFFICE VISIT (OUTPATIENT)
Dept: RHEUMATOLOGY | Facility: CLINIC | Age: 58
End: 2018-05-10
Attending: INTERNAL MEDICINE
Payer: COMMERCIAL

## 2018-05-10 VITALS
OXYGEN SATURATION: 96 % | WEIGHT: 123.6 LBS | HEIGHT: 64 IN | TEMPERATURE: 98.4 F | HEART RATE: 70 BPM | BODY MASS INDEX: 21.1 KG/M2 | DIASTOLIC BLOOD PRESSURE: 84 MMHG | SYSTOLIC BLOOD PRESSURE: 137 MMHG

## 2018-05-10 DIAGNOSIS — R76.8 ANA POSITIVE: ICD-10-CM

## 2018-05-10 DIAGNOSIS — M33.13 DERMATOMYOSITIS (H): ICD-10-CM

## 2018-05-10 DIAGNOSIS — Z79.899 ENCOUNTER FOR LONG-TERM (CURRENT) USE OF HIGH-RISK MEDICATION: ICD-10-CM

## 2018-05-10 DIAGNOSIS — Z94.2 S/P LUNG TRANSPLANT (H): ICD-10-CM

## 2018-05-10 DIAGNOSIS — J84.9 ILD (INTERSTITIAL LUNG DISEASE) (H): Primary | ICD-10-CM

## 2018-05-10 PROCEDURE — G0463 HOSPITAL OUTPT CLINIC VISIT: HCPCS | Mod: ZF

## 2018-05-10 ASSESSMENT — PAIN SCALES - GENERAL: PAINLEVEL: MODERATE PAIN (5)

## 2018-05-10 NOTE — LETTER
5/10/2018       RE: Kecia Blue  [id number] [place] [address]     Dear Colleague,    Thank you for referring your patient, Kecia Blue, to the [facility] RHEUMATOLOGY at [facility]. Please see a copy of my visit note below.    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      She is on the CellCept/tacrolimus regimen, which I would expect would likely control any underlying residual autoimmunity.  However, I think it would be prudent to watch her CK as prednisone is tapered down as it is conceivable that she would again started to have muscle enzyme elevation.  If that starts to happen then her CRP also will need to be tracked.      I do think it is worthwhile rechecking her autoantibody profile, particularly as she has some scleroderma skin features that may suggest that this is really more of an overlap syndrome.  I will therefore order those.  I would have a low threshold to also order phospholipid antibodies should she have any future vascular events, but I am not sure we should do those at this time.      In the event that she has features of progress autoimmunity, I would strongly consider placing her on hydroxychloroquine.  I would not do that at this time in the immediate posttransplant period however.      I am happy to see her in coordination with her transplant doctors here.  If she does well over the next year to year and a half however, she may not need ongoing care for followup with Rheumatology, and she does have an established rheumatologist locally that she could see  periodically or on an as-needed basis intermittently in the meantime.  I think in general, we can watch her expectantly given that she is already on an aggressive immunosuppressive regimen.  I did instruct her and her  however on the features of autoimmunity such as keratoconjunctivitis sicca, new sun-sensitive rashes (and also the need for her to have sun avoidance, given both the dermatomyositis diagnosis and an SSA antibody), worsening Raynaud's phenomena, worsening inflammatory stiffness in the hands, etc.               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      HISTORY OF PRESENT ILLNESS:  History is derived from extensive chart review from her outside records, her transplant notes here, and from the patient and her  who accompanies her.      The patient reports initial development of skin rashes in 2014 for which she sought care and were initially diagnosed as dermatitis.  Shortly thereafter, however, she began developing muscle weakness and some muscle pain and a diagnosis of dermatomyositis was made.  Ultimately, she was seen by Rheumatology locally, and serologies were performed that were positive for the Felisa-1 antibody as well as for SSA.  She never really did have the type of sun-sensitive rashes that would have fit better with a cutaneous lupus picture nor has she developed then or sense any significant keratoconjunctivitis sicca, however.      She was initially placed on MMF and was on it for about 3 months, but that reportedly did not help either the skin or muscles.  She also got a single course of rituximab, but it is unclear whether that helped her.  It is not clear to me from her history and I cannot glean enough from the notes to be sure how long after the rituximab she was then  placed on Imuran, so I do not know whether she may have gotten some partial but delayed effect from the rituximab.  Nonetheless, once on Imuran her muscles and skin improved nicely and she remain generally stable from that standpoint, but gradually over time her lungs began to worsen.  Ultimately, by 2016 her lungs had worsened enough she was referred here for ongoing ILD evaluation.  However, before she was seen here, she was seen at the [facility].  Diagnoses were confirmed at the [facility].  There was discussion of lung transplant at the [facility], but she was referred on here for further discussion of that.      She was seen here in 01/2018 by which time she had severe progressive ILD.  Tacrolimus had been added to her regimen.  She was placed on PCP prophylaxis and listed for lung transplant, and her progression was rapid enough that she ultimately was transplanted in February here.      By the time of transplant she was having quite significant Raynaud's phenomena in her fingers.  That has actually improved since then.  It has remained improved despite her being on beta blockers, suggesting that much of this may have been due to hypoxemia.  She does believe since the transplant that her strength has gradually improved.  She was of course on a ventilator, paralyzed, and getting high doses of prednisone so marked muscle weakness given her underlying myositis and her immobilization was to be expected and she is feeling like her strength continues to gradually improve.  She has had no recurrence of dermatomyositis skin rashes.      A full 10-point review of systems is negative for typical features of systemic sclerosis, although she does acknowledge some skin thickening out in her fingers and around her ankles which are obvious on exam.  These have not been troublesome to her, however.  She has had some arthralgias.  She has no prior history of sun-sensitive rashes or lupus-like rashes.  She has no prior  history of significant kidney disease.  She does have a history of blood clots after surgery and a history of miscarriages, 1 time only at 2 months into a pregnancy.  She also has prior history of migraine headaches.  She has not had, as noted above, any keratoconjunctivitis sicca.            Review of Systems:   Review Of Systems as above and per her past medical history otherwise:  Skin: negative  Eyes: negative  Ears/Nose/Throat: negative  Respiratory: No shortness of breath, dyspnea on exertion, cough, or hemoptysis  Cardiovascular: negative  Gastrointestinal: negative  Genitourinary: negative  Musculoskeletal: negative  Neurologic: negative  Psychiatric: negative  Hematologic/Lymphatic/Immunologic: negative  Endocrine: negative          Past Medical History:     Past Medical History:   Diagnosis Date     Antisynthetase syndrome (H) 2014     Chronic cough      Dermatomyositis (H)      Dysplasia of cervix, low grade (ESTRADA 1)      ILD (interstitial lung disease) (H)      Osteopenia      Pulmonary hypertension      Raynaud's disease      Seronegative rheumatoid arthritis (H)        Patient Active Problem List    Diagnosis Date Noted     Encounter for long-term (current) use of high-risk medication 04/03/2018     Priority: Medium     Deep vein thrombosis (DVT) (H) [I82.409] 03/30/2018     Priority: Medium     Steroid-induced hyperglycemia 03/11/2018     Priority: Medium     Lung transplant recipient (H) 03/01/2018     Priority: Medium     (Note: This summary should only be edited by a member of the lung transplant team. Thanks!)      Transplant providers, see [facility] for additional detailed information      Transplant Hospitalization Summary:  Bilateral  Lung Transplant       Involved in a trial? (ex. INSPIRE, EXPAND):     Notable Intra-Operative Information:    Notes here if pertinent       DONOR Information:    CDC Increased Risk: Y/N?      PATIENT Information:  Serologies:     Donor Recipient Intervention    CMV status      EBV status      HSV status        Transplant Complications:   PRE-op (Y/N) POST-op (Y/N)    Trach      Vent support      Chest tube  n/a    ECMO   Decannulation date: &&&     Primary Graft Dysfunction Documentation:   POD#1   (0-24 hours) POD#2   (25-48 hours) POD#3   (49-72 hours)   Date of data      Time of data      Intubated? Y/N Y/N Y/N   PaO2      FiO2      P/F Ratio      PGD Grade   (0=mild, 3=severe)      ECMO? Y/N Y/N Y/N   Inhaled NO? Y/N Y/N Y/N   ISHLT PGD Scoring  Grade 0 - PaO2/FiO2 >300 and normal chest radiograph (must be extubated to be Grade 0)  Grade 1 - PaO2/FiO2 >300 and diffuse allograft infiltrates on chest radiograph  Grade 2 - PaO2/FiO2 between 200 and 300  Grade 3 - PaO2/FiO2 <200    Post-Op Data:  Complication Y/N Date Comments   Date of Extubation(s) N/A     Return to OR?      Reintubated?      Date Last Chest Tube Removed N/A     Rejection?      Afib?      Renal failure?      HCAP?      DVT/PE?      Native lung pathology results        Prophylaxis:  1) Bactrim  2) Valcyte    Prednisone Taper Plan:    3/15/18 12 12   3/22/18 12 10   4/26/18 10 10   5/24/18 10 7.5   6/22/18 7.5 7.5   7/20/18 7.5 5   8/24/18 5 5   9/21/18 5 2.5             Discharge:  Discharge to: &&&Home / TCU / ARU  Discharge date: &&&       ILD (interstitial lung disease) (H) 02/27/2018     Priority: Medium     Acute on chronic respiratory failure with hypoxia (H) 02/21/2018     Priority: Medium             Past Surgical History:     Past Surgical History:   Procedure Laterality Date     BRONCHOSCOPY (RIGID OR FLEXIBLE), DIAGNOSTIC N/A 4/10/2018    Procedure: COMBINED BRONCHOSCOPY (RIGID OR FLEXIBLE), LAVAGE;;  Surgeon: Mariposa Donohue MD;  Location: [facility]     BRONCHOSCOPY FLEXIBLE N/A 3/13/2018    Procedure: BRONCHOSCOPY FLEXIBLE;  Flexible Bronchoscopy ;  Surgeon: Gissell Sanchez MD;  Location:  GI     BRONCHOSCOPY FLEXIBLE N/A 5/9/2018    Procedure: BRONCHOSCOPY FLEXIBLE;;  Surgeon:  Wilber Lin MD;  Location:  GI     ENT SURGERY      tonsillectomy as a child     INSERT EXTRACORPORAL MEMBRANE OXYGENATOR ADULT (OUTSIDE OR) N/A 2/27/2018    Procedure: INSERT EXTRACORPORAL MEMBRANE OXYGENATOR ADULT (OUTSIDE OR);  INSERT EXTRACORPORAL MEMBRANE OXYGENATOR ADULT (OUTSIDE OR) ;  Surgeon: Toby Hernandez MD;  Location:  OR     no prior surgery       REMOVE EXTRACORPORAL MEMBRANE OXYGENATOR ADULT N/A 3/3/2018    Procedure: REMOVE EXTRACORPORAL MEMBRANE OXYGENATOR ADULT;  Removal of Right Femoral Venous and Right Axillary Arterial Extracorporeal Membrane Oxygenator;  Surgeon: Toby Hernandez MD;  Location:  OR     TRANSPLANT LUNG RECIPIENT SINGLE X2 Bilateral 3/1/2018    Procedure: TRANSPLANT LUNG RECIPIENT SINGLE X2;  Median Sternotomy, Extracorporeal Membrane Oxygenator, bilateral sequential lung transplant;  Surgeon: Toby Hernandez MD;  Location:  OR             Social History:     Social History   Substance Use Topics     Smoking status: Never Smoker     Smokeless tobacco: Never Used     Alcohol use No             Family History:     Family History   Problem Relation Age of Onset     Hypertension Mother      Arthritis Mother      Pancreatic Cancer Father      DIABETES Father             Allergies:   No Known Allergies          Medications:     Current Outpatient Prescriptions   Medication Sig Dispense Refill     calcium-vitamin D (CALTRATE) 600-400 MG-UNIT per tablet Take 1 tablet by mouth 2 times daily (with meals) 60 tablet 1     enoxaparin (LOVENOX) 60 MG/0.6ML injection Inject 60 mg subcutaneously every 12 hours as directed by Anticoagulation Clinic 10 Syringe 0     enoxaparin (LOVENOX) 60 MG/0.6ML injection Inject 60 mg subcutaneously every 12 hours or as directed by Anticoagulation Clinic. 25 Syringe 1     levalbuterol (XOPENEX HFA) 45 MCG/ACT Inhaler Inhale 2 puffs into the lungs every 6 hours as needed for shortness of breath / dyspnea or  "wheezing 1 Inhaler 1     magnesium oxide (MAG-OX) 400 MG tablet Take 1 tablet (400 mg) by mouth daily 30 tablet 1     metoprolol tartrate (LOPRESSOR) 25 MG tablet Take 1 tablet (25 mg) by mouth 2 times daily 60 tablet 1     multivitamin, therapeutic with minerals (THERA-VIT-M) TABS tablet Take 1 tablet by mouth daily 30 each 11     mycophenolate (GENERIC EQUIVALENT) 250 MG capsule Take 6 capsules (1,500 mg) by mouth 2 times daily 360 capsule 11     nystatin (MYCOSTATIN) 971104 UNIT/ML suspension Take 10 mLs (1,000,000 Units) by mouth 4 times daily 1200 mL 5     order for DME Equipment being ordered: Oxygen 5 liters at rest, 15 liters with exertion.  Needs portability.  Please provide 2 10-liter concentrators for in the home 1 Device prn     pantoprazole (PROTONIX) 40 MG EC tablet Take 1 tablet (40 mg) by mouth every morning 30 tablet 1     predniSONE (DELTASONE) 5 MG tablet Take 10 mg twice daily until 5/24, follow taper 300 tablet 3     sulfamethoxazole-trimethoprim (BACTRIM/SEPTRA) 400-80 MG per tablet 1 tablet by Oral or NG Tube route daily 30 tablet 11     tacrolimus (GENERIC EQUIVALENT) 1 MG capsule Take 5 mg in the AM and 6 mg in the  capsule 11     valGANciclovir (VALCYTE) 450 MG tablet Take 2 tablets (900 mg) by mouth daily 60 tablet 6     warfarin (COUMADIN) 1 MG tablet Take 4mg MWFSat and 6mg TuThSun, or as directed by the [facility] at the [facility] 140 tablet 2     polyethylene glycol (MIRALAX/GLYCOLAX) Packet Take 17 g by mouth 2 times daily as needed for constipation 60 packet 0     senna-docusate (SENOKOT-S;PERICOLACE) 8.6-50 MG per tablet Take 2 tablets by mouth 2 times daily as needed for constipation 100 tablet 0            Physical Exam:   Blood pressure 137/84, pulse 70, temperature 98.4  F (36.9  C), temperature source Oral, height 1.626 m (5' 4\"), weight 56.1 kg (123 lb 9.6 oz), last menstrual period 06/07/2014, SpO2 96 %.  Constitutional: [id number] cooperative  Eyes: nl " EOM, PERRLA, vision, conjunctiva, sclera  ENT: nl external ears, nose, hearing, lips, teeth, gums, throat  No mucous membrane lesions, normal saliva pool  Neck: no mass or thyroid enlargement  Resp: lungs clear to auscultation, nl to palpation  CV: RRR, no murmurs, rubs or gallops, no edema  GI: no ABD mass or tenderness, no HSM  : not tested  Lymph: no cervical, supraclavicular, inguinal or epitrochlear nodes  MS: All TMJ, neck, shoulder, elbow, wrist, MCP/PIP/DIP, spine, hip, knee, ankle, and foot MTP/IP joints were examined and  found normal. No active synovitis or deformity. Full ROM.  Normal  strength. No dactylitis,  tenosynovitis, enthespathy   Skin: She has no active dermatomyositis-like rashes: She does have 3+ skin thickening in her fingers that would be consistent with systemic sclerosis and also has 2-3+ skin thickening over her ankles with minimal skin thickening more distally in the feet or proximally in the legs or arms.  She does have active cyanosis in the hands less notably in the feet.  No nail pitting, alopecia, rash, nodules or lesions  Neuro: Her strength is near normal perhaps 4- out of 5 in the hip flexors with prolonged effort.  Nl cranial nerves, , sensation, DTRs.   Psych: nl judgement, orientation, memory, affect.         Data:     Lab Results   Component Value Date    WBC 7.7 05/08/2018    WBC 8.1 04/30/2018    WBC 7.9 04/26/2018    HGB 9.6 (L) 05/08/2018    HGB 9.7 (L) 04/30/2018    HGB 9.7 (L) 04/26/2018    HCT 31.3 (L) 05/08/2018    HCT 31.1 (L) 04/30/2018    HCT 31.1 (L) 04/26/2018     (H) 05/08/2018     (H) 04/30/2018     (H) 04/26/2018     05/08/2018     (H) 04/30/2018     (H) 04/26/2018     Lab Results   Component Value Date    BUN 23 05/08/2018    BUN 25 04/30/2018    BUN 26 04/26/2018     No components found for: SEDRATE  Lab Results   Component Value Date    TSH 3.07 02/19/2018     Lab Results   Component Value Date    AST 34  03/03/2018    AST 38 03/01/2018    AST 58 (H) 03/01/2018    ALT 29 03/24/2018    ALT 28 03/23/2018    ALT 24 03/22/2018    ALKPHOS 27 (L) 03/03/2018    ALKPHOS 35 (L) 03/01/2018    ALKPHOS 37 (L) 03/01/2018     Reviewed Rheumatology lab flowsheet    Franklyn Jolly

## 2018-05-10 NOTE — LETTER
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      She is on the CellCept/tacrolimus regimen, which I would expect would likely control any underlying residual autoimmunity.  However, I think it would be prudent to watch her CK as prednisone is tapered down as it is conceivable that she would again started to have muscle enzyme elevation.  If that starts to happen then her CRP also will need to be tracked.      I do think it is worthwhile rechecking her autoantibody profile, particularly as she has some scleroderma skin features that may suggest that this is really more of an overlap syndrome.  I will therefore order those.  I would have a low threshold to also order phospholipid antibodies should she have any future vascular events, but I am not sure we should do those at this time.      In the event that she has features of progress autoimmunity, I would strongly consider placing her on hydroxychloroquine.  I would not do that at this time in the immediate posttransplant period however.      I am happy to see her in coordination with her transplant doctors here.  If she does well over the next year to year and a half however, she may not need ongoing care for followup with Rheumatology, and she does have an established rheumatologist locally that she could see periodically or on an as-needed basis intermittently in the meantime.  I think in general, we can watch her expectantly given that she is already on an aggressive immunosuppressive regimen.  I did instruct her  and her  however on the features of autoimmunity such as keratoconjunctivitis sicca, new sun-sensitive rashes (and also the need for her to have sun avoidance, given both the dermatomyositis diagnosis and an SSA antibody), worsening Raynaud's phenomena, worsening inflammatory stiffness in the hands, etc.               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      HISTORY OF PRESENT ILLNESS:  History is derived from extensive chart review from her outside records, her transplant notes here, and from the patient and her  who accompanies her.      The patient reports initial development of skin rashes in 2014 for which she sought care and were initially diagnosed as dermatitis.  Shortly thereafter, however, she began developing muscle weakness and some muscle pain and a diagnosis of dermatomyositis was made.  Ultimately, she was seen by Rheumatology locally, and serologies were performed that were positive for the Felisa-1 antibody as well as for SSA.  She never really did have the type of sun-sensitive rashes that would have fit better with a cutaneous lupus picture nor has she developed then or sense any significant keratoconjunctivitis sicca, however.      She was initially placed on MMF and was on it for about 3 months, but that reportedly did not help either the skin or muscles.  She also got a single course of rituximab, but it is unclear whether that helped her.  It is not clear to me from her history and I cannot glean enough from the notes to be sure how long after the rituximab she was then placed on Imuran, so I do not know whether she may have gotten some partial but delayed effect from the rituximab.  Nonetheless, once on Imuran her muscles and skin improved nicely and she remain generally stable  from that standpoint, but gradually over time her lungs began to worsen.  Ultimately, by 2016 her lungs had worsened enough she was referred here for ongoing ILD evaluation.  However, before she was seen here, she was seen at the [facility].  Diagnoses were confirmed at the [facility].  There was discussion of lung transplant at the [facility], but she was referred on here for further discussion of that.      She was seen here in 01/2018 by which time she had severe progressive ILD.  Tacrolimus had been added to her regimen.  She was placed on PCP prophylaxis and listed for lung transplant, and her progression was rapid enough that she ultimately was transplanted in February here.      By the time of transplant she was having quite significant Raynaud's phenomena in her fingers.  That has actually improved since then.  It has remained improved despite her being on beta blockers, suggesting that much of this may have been due to hypoxemia.  She does believe since the transplant that her strength has gradually improved.  She was of course on a ventilator, paralyzed, and getting high doses of prednisone so marked muscle weakness given her underlying myositis and her immobilization was to be expected and she is feeling like her strength continues to gradually improve.  She has had no recurrence of dermatomyositis skin rashes.      A full 10-point review of systems is negative for typical features of systemic sclerosis, although she does acknowledge some skin thickening out in her fingers and around her ankles which are obvious on exam.  These have not been troublesome to her, however.  She has had some arthralgias.  She has no prior history of sun-sensitive rashes or lupus-like rashes.  She has no prior history of significant kidney disease.  She does have a history of blood clots after surgery and a history of miscarriages, 1 time only at 2 months into a pregnancy.  She also has prior history of migraine headaches.   She has not had, as noted above, any keratoconjunctivitis sicca.            Review of Systems:   Review Of Systems as above and per her past medical history otherwise:  Skin: negative  Eyes: negative  Ears/Nose/Throat: negative  Respiratory: No shortness of breath, dyspnea on exertion, cough, or hemoptysis  Cardiovascular: negative  Gastrointestinal: negative  Genitourinary: negative  Musculoskeletal: negative  Neurologic: negative  Psychiatric: negative  Hematologic/Lymphatic/Immunologic: negative  Endocrine: negative          Past Medical History:     Past Medical History:   Diagnosis Date     Antisynthetase syndrome (H) 2014     Chronic cough      Dermatomyositis (H)      Dysplasia of cervix, low grade (ESTRADA 1)      ILD (interstitial lung disease) (H)      Osteopenia      Pulmonary hypertension      Raynaud's disease      Seronegative rheumatoid arthritis (H)        Patient Active Problem List    Diagnosis Date Noted     Encounter for long-term (current) use of high-risk medication 04/03/2018     Priority: Medium     Deep vein thrombosis (DVT) (H) [I82.409] 03/30/2018     Priority: Medium     Steroid-induced hyperglycemia 03/11/2018     Priority: Medium     Lung transplant recipient (H) 03/01/2018     Priority: Medium     (Note: This summary should only be edited by a member of the lung transplant team. Thanks!)      Transplant providers, see [facility] for additional detailed information      Transplant Hospitalization Summary:  Bilateral  Lung Transplant       Involved in a trial? (ex. INSPIRE, EXPAND):     Notable Intra-Operative Information:    Notes here if pertinent       DONOR Information:    CDC Increased Risk: Y/N?      PATIENT Information:  Serologies:     Donor Recipient Intervention   CMV status      EBV status      HSV status        Transplant Complications:   PRE-op (Y/N) POST-op (Y/N)    Trach      Vent support      Chest tube  n/a    ECMO   Decannulation date: &&&     Primary Graft Dysfunction  Documentation:   POD#1   (0-24 hours) POD#2   (25-48 hours) POD#3   (49-72 hours)   Date of data      Time of data      Intubated? Y/N Y/N Y/N   PaO2      FiO2      P/F Ratio      PGD Grade   (0=mild, 3=severe)      ECMO? Y/N Y/N Y/N   Inhaled NO? Y/N Y/N Y/N   ISHLT PGD Scoring  Grade 0 - PaO2/FiO2 >300 and normal chest radiograph (must be extubated to be Grade 0)  Grade 1 - PaO2/FiO2 >300 and diffuse allograft infiltrates on chest radiograph  Grade 2 - PaO2/FiO2 between 200 and 300  Grade 3 - PaO2/FiO2 <200    Post-Op Data:  Complication Y/N Date Comments   Date of Extubation(s) N/A     Return to OR?      Reintubated?      Date Last Chest Tube Removed N/A     Rejection?      Afib?      Renal failure?      HCAP?      DVT/PE?      Native lung pathology results        Prophylaxis:  1) Bactrim  2) Valcyte    Prednisone Taper Plan:    3/15/18 12 12   3/22/18 12 10   4/26/18 10 10   5/24/18 10 7.5   6/22/18 7.5 7.5   7/20/18 7.5 5   8/24/18 5 5   9/21/18 5 2.5             Discharge:  Discharge to: &&&Home / TCU / ARU  Discharge date: &&&       ILD (interstitial lung disease) (H) 02/27/2018     Priority: Medium     Acute on chronic respiratory failure with hypoxia (H) 02/21/2018     Priority: Medium             Past Surgical History:     Past Surgical History:   Procedure Laterality Date     BRONCHOSCOPY (RIGID OR FLEXIBLE), DIAGNOSTIC N/A 4/10/2018    Procedure: COMBINED BRONCHOSCOPY (RIGID OR FLEXIBLE), LAVAGE;;  Surgeon: Mariposa Donohue MD;  Location:  GI     BRONCHOSCOPY FLEXIBLE N/A 3/13/2018    Procedure: BRONCHOSCOPY FLEXIBLE;  Flexible Bronchoscopy ;  Surgeon: Gissell Sanchez MD;  Location:  GI     BRONCHOSCOPY FLEXIBLE N/A 5/9/2018    Procedure: BRONCHOSCOPY FLEXIBLE;;  Surgeon: Wilber Lin MD;  Location:  GI     ENT SURGERY      tonsillectomy as a child     INSERT EXTRACORPORAL MEMBRANE OXYGENATOR ADULT (OUTSIDE OR) N/A 2/27/2018    Procedure: INSERT EXTRACORPORAL MEMBRANE OXYGENATOR  ADULT (OUTSIDE OR);  INSERT EXTRACORPORAL MEMBRANE OXYGENATOR ADULT (OUTSIDE OR) ;  Surgeon: Toby Hernandez MD;  Location: [facility] OR     no prior surgery       REMOVE EXTRACORPORAL MEMBRANE OXYGENATOR ADULT N/A 3/3/2018    Procedure: REMOVE EXTRACORPORAL MEMBRANE OXYGENATOR ADULT;  Removal of Right Femoral Venous and Right Axillary Arterial Extracorporeal Membrane Oxygenator;  Surgeon: Toby Hernandez MD;  Location: [facility] OR     TRANSPLANT LUNG RECIPIENT SINGLE X2 Bilateral 3/1/2018    Procedure: TRANSPLANT LUNG RECIPIENT SINGLE X2;  Median Sternotomy, Extracorporeal Membrane Oxygenator, bilateral sequential lung transplant;  Surgeon: Toby Hernandez MD;  Location: [facility] OR             Social History:     Social History   Substance Use Topics     Smoking status: Never Smoker     Smokeless tobacco: Never Used     Alcohol use No             Family History:     Family History   Problem Relation Age of Onset     Hypertension Mother      Arthritis Mother      Pancreatic Cancer Father      DIABETES Father             Allergies:   No Known Allergies          Medications:     Current Outpatient Prescriptions   Medication Sig Dispense Refill     calcium-vitamin D (CALTRATE) 600-400 MG-UNIT per tablet Take 1 tablet by mouth 2 times daily (with meals) 60 tablet 1     enoxaparin (LOVENOX) 60 MG/0.6ML injection Inject 60 mg subcutaneously every 12 hours as directed by Anticoagulation Clinic 10 Syringe 0     enoxaparin (LOVENOX) 60 MG/0.6ML injection Inject 60 mg subcutaneously every 12 hours or as directed by Anticoagulation Clinic. 25 Syringe 1     levalbuterol (XOPENEX HFA) 45 MCG/ACT Inhaler Inhale 2 puffs into the lungs every 6 hours as needed for shortness of breath / dyspnea or wheezing 1 Inhaler 1     magnesium oxide (MAG-OX) 400 MG tablet Take 1 tablet (400 mg) by mouth daily 30 tablet 1     metoprolol tartrate (LOPRESSOR) 25 MG tablet Take 1 tablet (25 mg) by mouth 2 times daily 60 tablet 1      "multivitamin, therapeutic with minerals (THERA-VIT-M) TABS tablet Take 1 tablet by mouth daily 30 each 11     mycophenolate (GENERIC EQUIVALENT) 250 MG capsule Take 6 capsules (1,500 mg) by mouth 2 times daily 360 capsule 11     nystatin (MYCOSTATIN) 193474 UNIT/ML suspension Take 10 mLs (1,000,000 Units) by mouth 4 times daily 1200 mL 5     order for DME Equipment being ordered: Oxygen 5 liters at rest, 15 liters with exertion.  Needs portability.  Please provide 2 10-liter concentrators for in the home 1 Device prn     pantoprazole (PROTONIX) 40 MG EC tablet Take 1 tablet (40 mg) by mouth every morning 30 tablet 1     predniSONE (DELTASONE) 5 MG tablet Take 10 mg twice daily until 5/24, follow taper 300 tablet 3     sulfamethoxazole-trimethoprim (BACTRIM/SEPTRA) 400-80 MG per tablet 1 tablet by Oral or NG Tube route daily 30 tablet 11     tacrolimus (GENERIC EQUIVALENT) 1 MG capsule Take 5 mg in the AM and 6 mg in the  capsule 11     valGANciclovir (VALCYTE) 450 MG tablet Take 2 tablets (900 mg) by mouth daily 60 tablet 6     warfarin (COUMADIN) 1 MG tablet Take 4mg MWFSat and 6mg TuThSun, or as directed by the Medication Monitoring Clinic at the  of . 140 tablet 2     polyethylene glycol (MIRALAX/GLYCOLAX) Packet Take 17 g by mouth 2 times daily as needed for constipation 60 packet 0     senna-docusate (SENOKOT-S;PERICOLACE) 8.6-50 MG per tablet Take 2 tablets by mouth 2 times daily as needed for constipation 100 tablet 0            Physical Exam:   Blood pressure 137/84, pulse 70, temperature 98.4  F (36.9  C), temperature source Oral, height 1.626 m (5' 4\"), weight 56.1 kg (123 lb 9.6 oz), last menstrual period 06/07/2014, SpO2 96 %.  Constitutional: WD-[id number] cooperative  Eyes: nl EOM, PERRLA, vision, conjunctiva, sclera  ENT: nl external ears, nose, hearing, lips, teeth, gums, throat  No mucous membrane lesions, normal saliva pool  Neck: no mass or thyroid enlargement  Resp: lungs clear to " auscultation, nl to palpation  CV: RRR, no murmurs, rubs or gallops, no edema  GI: no ABD mass or tenderness, no HSM  : not tested  Lymph: no cervical, supraclavicular, inguinal or epitrochlear nodes  MS: All TMJ, neck, shoulder, elbow, wrist, MCP/PIP/DIP, spine, hip, knee, ankle, and foot MTP/IP joints were examined and  found normal. No active synovitis or deformity. Full ROM.  Normal  strength. No dactylitis,  tenosynovitis, enthespathy   Skin: She has no active dermatomyositis-like rashes: She does have 3+ skin thickening in her fingers that would be consistent with systemic sclerosis and also has 2-3+ skin thickening over her ankles with minimal skin thickening more distally in the feet or proximally in the legs or arms.  She does have active cyanosis in the hands less notably in the feet.  No nail pitting, alopecia, rash, nodules or lesions  Neuro: Her strength is near normal perhaps 4- out of 5 in the hip flexors with prolonged effort.  Nl cranial nerves, , sensation, DTRs.   Psych: nl judgement, orientation, memory, affect.         Data:     Lab Results   Component Value Date    WBC 7.7 05/08/2018    WBC 8.1 04/30/2018    WBC 7.9 04/26/2018    HGB 9.6 (L) 05/08/2018    HGB 9.7 (L) 04/30/2018    HGB 9.7 (L) 04/26/2018    HCT 31.3 (L) 05/08/2018    HCT 31.1 (L) 04/30/2018    HCT 31.1 (L) 04/26/2018     (H) 05/08/2018     (H) 04/30/2018     (H) 04/26/2018     05/08/2018     (H) 04/30/2018     (H) 04/26/2018     Lab Results   Component Value Date    BUN 23 05/08/2018    BUN 25 04/30/2018    BUN 26 04/26/2018     No components found for: SEDRATE  Lab Results   Component Value Date    TSH 3.07 02/19/2018     Lab Results   Component Value Date    AST 34 03/03/2018    AST 38 03/01/2018    AST 58 (H) 03/01/2018    ALT 29 03/24/2018    ALT 28 03/23/2018    ALT 24 03/22/2018    ALKPHOS 27 (L) 03/03/2018    ALKPHOS 35 (L) 03/01/2018    ALKPHOS 37 (L) 03/01/2018      Reviewed Rheumatology lab flowsheet    Franklyn Jolly MD

## 2018-05-10 NOTE — NURSING NOTE
Chief Complaint   Patient presents with     Consult     Raynauds   Pt roomed, vitals, meds, and allergies reviewed with pt. Pt ready for provider.  Duane Chand, CMA

## 2018-05-10 NOTE — MR AVS SNAPSHOT
After Visit Summary   5/10/2018    Kecia Blue    MRN: [id number]           Patient Information     Date Of Birth          1960        Visit Information        Provider Department      5/10/2018 3:30 PM Franklyn Jolly MD [facility] Rheumatology        Today's Diagnoses     ILD (interstitial lung disease) (H)    -  1    S/P lung transplant (H)        Dermatomyositis (H)        Encounter for long-term (current) use of high-risk medication        BENJAMÍN positive           Follow-ups after your visit        Your next 10 appointments already scheduled     May 16, 2018  9:15 AM CDT   Lab with Apokalyyis LAB    Health Lab ([facility])    [address] [place] [address]   [phone]            May 16, 2018 10:00 AM CDT   Pulmonary Treatment with UR PULMONARY REHAB 2   [facility], Cardiac Rehabilitation ([facility])    [address] [place] [address]   [phone]            May 18, 2018 10:00 AM CDT   Pulmonary Treatment with UR PULMONARY REHAB 2   [facility], Cardiac Rehabilitation ([facility])    [address] [place] [address]   [phone]            May 21, 2018  7:00 AM CDT   Lab with Apokalyyis LAB    Health Lab ([facility])    [address] [place] [id number] [phone]            May 21, 2018  7:15 AM CDT   XR CHEST 2 VIEWS with UCXR1   [facility] Center Xray ([facility])    [address] [place] [id number] [phone]           Please bring a list of your current medicines to your exam. (Include vitamins, minerals and over-thecounter medicines.) Leave your valuables at home.  Tell your doctor if there is a chance you may be pregnant.  You do not  need to do anything special for this exam.            May 21, 2018  7:30 AM CDT   PFT VISIT with SUKI PFL LLUVIA   [facility] Pulmonary Function Testing ([facility])    [address] [facility]  3rd [facility] [address] [phone]            May 21, 2018  8:00 AM CDT   (Arrive by 7:45 AM)   Return Lung Transplant with Alex Hale MD   [facility] Solid Organ Transplant ([facility])    [address] [address] [phone]            May 23, 2018 10:00 AM CDT   Pulmonary Treatment with UR PULMONARY REHAB 2   [facility], Cardiac Rehabilitation ([facility])    [address] [place] [address] [address] [address] [phone]            May 25, 2018 10:00 AM CDT   Pulmonary Treatment with UR PULMONARY REHAB 2   [facility], Cardiac Rehabilitation ([facility])    [address] [place] [address]   [phone]            May 30, 2018 10:00 AM CDT   Pulmonary Treatment with UR PULMONARY REHAB 2   [facility], Cardiac Rehabilitation ([facility])    [address] [place] [address]   [phone]              Who to contact     If you have questions or need follow up information about today's clinic visit or your schedule please contact [facility] RHEUMATOLOGY directly at [phone].  Normal or non-critical lab and imaging results will be communicated to you by MyChart, letter or phone within 4 business days after the clinic has received the results. If you do not hear from us within 7 days, please contact the clinic through MyChart or phone. If you have a critical or abnormal lab result, we will notify you by phone as soon as possible.  Submit refill requests through  "MyChart or call your pharmacy and they will forward the refill request to us. Please allow 3 business days for your refill to be completed.          Additional Information About Your Visit        MyChart Information     [employer] gives you secure access to your electronic health record. If you see a primary care provider, you can also send messages to your care team and make appointments. If you have questions, please call your primary care clinic.  If you do not have a primary care provider, please call [phone] and they will assist you.        Care EveryWhere ID     This is your Care EveryWhere ID. This could be used by other organizations to access your [facility] medical records  [id number]        Your Vitals Were     Pulse Temperature Height Last Period Pulse Oximetry BMI (Body Mass Index)    70 98.4  F (36.9  C) (Oral) 1.626 m (5' 4\") 06/07/2014 (Exact Date) 96% 21.22 kg/m2       Blood Pressure from Last 3 Encounters:   05/10/18 137/84   05/09/18 135/89   05/08/18 141/86    Weight from Last 3 Encounters:   05/11/18 54.9 kg (121 lb)   05/10/18 56.1 kg (123 lb 9.6 oz)   05/08/18 55.3 kg (122 lb)               Primary Care Provider Office Phone # Fax #    Rosymanjit Vu [phone]       [facility]  [address]  [facility] [address]        Equal Access to Services     [facility]: Hadii patricia ku hadasho Somachelleali, waaxda luqadaha, qaybta kaalmada adeegyada, morro barron adekelli daily . So [facility] [phone].    ATENCIÓN: Si habla español, tiene a taylor disposición servicios gratuitos de asistencia lingüística. Llame al [phone].    We comply with applicable federal civil rights laws and [place] laws. We do not discriminate on the basis of race, color, national origin, age, disability, sex, sexual orientation, or gender identity.            Thank you!     Thank you for choosing [facility]  for your care. Our goal is always to provide you with excellent " care. Hearing back from our patients is one way we can continue to improve our services. Please take a few minutes to complete the written survey that you may receive in the mail after your visit with us. Thank you!             Your Updated Medication List - Protect others around you: Learn how to safely use, store and throw away your medicines at www.disposemymeds.org.          This list is accurate as of 5/10/18 11:59 PM.  Always use your most recent med list.                   Brand Name Dispense Instructions for use Diagnosis    calcium-vitamin D 600-400 MG-UNIT per tablet    CALTRATE    60 tablet    Take 1 tablet by mouth 2 times daily (with meals)    S/P lung transplant (H)       * enoxaparin 60 MG/0.6ML injection    LOVENOX    10 Syringe    Inject 60 mg subcutaneously every 12 hours as directed by Anticoagulation Clinic    Acute deep vein thrombosis (DVT) of left upper extremity, unspecified vein (H), Lung transplant recipient (H)       * enoxaparin 60 MG/0.6ML injection    LOVENOX    25 Syringe    Inject 60 mg subcutaneously every 12 hours or as directed by Anticoagulation Clinic.    Acute deep vein thrombosis (DVT) of left upper extremity, unspecified vein (H), Lung transplant recipient (H)       levalbuterol 45 MCG/ACT Inhaler    XOPENEX HFA    1 Inhaler    Inhale 2 puffs into the lungs every 6 hours as needed for shortness of breath / dyspnea or wheezing    S/P lung transplant (H), Other constipation       magnesium oxide 400 MG tablet    MAG-OX    30 tablet    Take 1 tablet (400 mg) by mouth daily    Hypomagnesemia       metoprolol tartrate 25 MG tablet    LOPRESSOR    60 tablet    Take 1 tablet (25 mg) by mouth 2 times daily    Paroxysmal atrial fibrillation (H)       multivitamin, therapeutic with minerals Tabs tablet     30 each    Take 1 tablet by mouth daily    Lung transplant recipient (H)       mycophenolate 250 MG capsule    GENERIC EQUIVALENT    360 capsule    Take 6 capsules (1,500 mg) by mouth  2 times daily    S/P lung transplant (H)       nystatin 136734 UNIT/ML suspension    MYCOSTATIN    1200 mL    Take 10 mLs (1,000,000 Units) by mouth 4 times daily    Lung transplant recipient (H)       order for DME     1 Device    Equipment being ordered: Oxygen 5 liters at rest, 15 liters with exertion.  Needs portability.  Please provide 2 10-liter concentrators for in the home    ILD (interstitial lung disease) (H), Hypoxia       pantoprazole 40 MG EC tablet    PROTONIX    30 tablet    Take 1 tablet (40 mg) by mouth every morning    Gastroesophageal reflux disease without esophagitis       polyethylene glycol Packet    MIRALAX/GLYCOLAX    60 packet    Take 17 g by mouth 2 times daily as needed for constipation    Other constipation       predniSONE 5 MG tablet    DELTASONE    300 tablet    Take 10 mg twice daily until 5/24, follow taper    S/P lung transplant (H)       senna-docusate 8.6-50 MG per tablet    SENOKOT-S;PERICOLACE    100 tablet    Take 2 tablets by mouth 2 times daily as needed for constipation    Other constipation, S/P lung transplant (H)       sulfamethoxazole-trimethoprim 400-80 MG per tablet    BACTRIM/SEPTRA    30 tablet    1 tablet by Oral or NG Tube route daily    Organ transplant candidate, Lung disease, interstitial (H), Abnormal chest CT, Hypotension, unspecified hypotension type, Acute on chronic respiratory failure with hypoxia (H), ILD (interstitial lung disease) (H)       tacrolimus 1 MG capsule    GENERIC EQUIVALENT    330 capsule    Take 5 mg in the AM and 6 mg in the PM    S/P lung transplant (H), Other constipation       valGANciclovir 450 MG tablet    VALCYTE    60 tablet    Take 2 tablets (900 mg) by mouth daily    S/P lung transplant (H)       warfarin 1 MG tablet    COUMADIN    140 tablet    Take 4mg MWFSat and 6mg TuThSun, or as directed by the Medication Monitoring Clinic at the  of .    DVT (deep venous thrombosis) (H), Status post lung transplantation (H)       *  Notice:  This list has 2 medication(s) that are the same as other medications prescribed for you. Read the directions carefully, and ask your doctor or other care provider to review them with you.

## 2018-05-10 NOTE — PROGRESS NOTES
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      She is on the CellCept/tacrolimus regimen, which I would expect would likely control any underlying residual autoimmunity.  However, I think it would be prudent to watch her CK as prednisone is tapered down as it is conceivable that she would again started to have muscle enzyme elevation.  If that starts to happen then her CRP also will need to be tracked.      I do think it is worthwhile rechecking her autoantibody profile, particularly as she has some scleroderma skin features that may suggest that this is really more of an overlap syndrome.  I will therefore order those.  I would have a low threshold to also order phospholipid antibodies should she have any future vascular events, but I am not sure we should do those at this time.      In the event that she has features of progress autoimmunity, I would strongly consider placing her on hydroxychloroquine.  I would not do that at this time in the immediate posttransplant period however.      I am happy to see her in coordination with her transplant doctors here.  If she does well over the next year to year and a half however, she may not need ongoing care for followup with Rheumatology, and she does have an established rheumatologist locally that she could see periodically or on an as-needed basis intermittently in the meantime.  I think in general, we can watch her expectantly given that she is already on an aggressive immunosuppressive regimen.  I did instruct her and her  however on the features of autoimmunity such as keratoconjunctivitis  sicca, new sun-sensitive rashes (and also the need for her to have sun avoidance, given both the dermatomyositis diagnosis and an SSA antibody), worsening Raynaud's phenomena, worsening inflammatory stiffness in the hands, etc.               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      HISTORY OF PRESENT ILLNESS:  History is derived from extensive chart review from her outside records, her transplant notes here, and from the patient and her  who accompanies her.      The patient reports initial development of skin rashes in 2014 for which she sought care and were initially diagnosed as dermatitis.  Shortly thereafter, however, she began developing muscle weakness and some muscle pain and a diagnosis of dermatomyositis was made.  Ultimately, she was seen by Rheumatology locally, and serologies were performed that were positive for the Felisa-1 antibody as well as for SSA.  She never really did have the type of sun-sensitive rashes that would have fit better with a cutaneous lupus picture nor has she developed then or sense any significant keratoconjunctivitis sicca, however.      She was initially placed on MMF and was on it for about 3 months, but that reportedly did not help either the skin or muscles.  She also got a single course of rituximab, but it is unclear whether that helped her.  It is not clear to me from her history and I cannot glean enough from the notes to be sure how long after the rituximab she was then placed on Imuran, so I do not know whether she may have gotten some partial but delayed effect from the rituximab.  Nonetheless, once on Imuran her muscles and skin improved nicely and she remain generally stable from that standpoint, but gradually over time her lungs began to worsen.  Ultimately,  by 2016 her lungs had worsened enough she was referred here for ongoing ILD evaluation.  However, before she was seen here, she was seen at the [facility].  Diagnoses were confirmed at the [facility].  There was discussion of lung transplant at the [facility], but she was referred on here for further discussion of that.      She was seen here in 01/2018 by which time she had severe progressive ILD.  Tacrolimus had been added to her regimen.  She was placed on PCP prophylaxis and listed for lung transplant, and her progression was rapid enough that she ultimately was transplanted in February here.      By the time of transplant she was having quite significant Raynaud's phenomena in her fingers.  That has actually improved since then.  It has remained improved despite her being on beta blockers, suggesting that much of this may have been due to hypoxemia.  She does believe since the transplant that her strength has gradually improved.  She was of course on a ventilator, paralyzed, and getting high doses of prednisone so marked muscle weakness given her underlying myositis and her immobilization was to be expected and she is feeling like her strength continues to gradually improve.  She has had no recurrence of dermatomyositis skin rashes.      A full 10-point review of systems is negative for typical features of systemic sclerosis, although she does acknowledge some skin thickening out in her fingers and around her ankles which are obvious on exam.  These have not been troublesome to her, however.  She has had some arthralgias.  She has no prior history of sun-sensitive rashes or lupus-like rashes.  She has no prior history of significant kidney disease.  She does have a history of blood clots after surgery and a history of miscarriages, 1 time only at 2 months into a pregnancy.  She also has prior history of migraine headaches.  She has not had, as noted above, any keratoconjunctivitis sicca.            Review of  Systems:   Review Of Systems as above and per her past medical history otherwise:  Skin: negative  Eyes: negative  Ears/Nose/Throat: negative  Respiratory: No shortness of breath, dyspnea on exertion, cough, or hemoptysis  Cardiovascular: negative  Gastrointestinal: negative  Genitourinary: negative  Musculoskeletal: negative  Neurologic: negative  Psychiatric: negative  Hematologic/Lymphatic/Immunologic: negative  Endocrine: negative          Past Medical History:     Past Medical History:   Diagnosis Date     Antisynthetase syndrome (H) 2014     Chronic cough      Dermatomyositis (H)      Dysplasia of cervix, low grade (ESTRADA 1)      ILD (interstitial lung disease) (H)      Osteopenia      Pulmonary hypertension      Raynaud's disease      Seronegative rheumatoid arthritis (H)        Patient Active Problem List    Diagnosis Date Noted     Encounter for long-term (current) use of high-risk medication 04/03/2018     Priority: Medium     Deep vein thrombosis (DVT) (H) [I82.409] 03/30/2018     Priority: Medium     Steroid-induced hyperglycemia 03/11/2018     Priority: Medium     Lung transplant recipient (H) 03/01/2018     Priority: Medium     (Note: This summary should only be edited by a member of the lung transplant team. Thanks!)      Transplant providers, see [facility] [place] for additional detailed information      Transplant Hospitalization Summary:  Bilateral  Lung Transplant       Involved in a trial? (ex. INSPIRE, EXPAND):     Notable Intra-Operative Information:    Notes here if pertinent       DONOR Information:    CDC Increased Risk: Y/N?      PATIENT Information:  Serologies:     Donor Recipient Intervention   CMV status      EBV status      HSV status        Transplant Complications:   PRE-op (Y/N) POST-op (Y/N)    Trach      Vent support      Chest tube  n/a    ECMO   Decannulation date: &&&     Primary Graft Dysfunction Documentation:   POD#1   (0-24 hours) POD#2   (25-48 hours) POD#3   (49-72 hours)   Date  of data      Time of data      Intubated? Y/N Y/N Y/N   PaO2      FiO2      P/F Ratio      PGD Grade   (0=mild, 3=severe)      ECMO? Y/N Y/N Y/N   Inhaled NO? Y/N Y/N Y/N   ISHLT PGD Scoring  Grade 0 - PaO2/FiO2 >300 and normal chest radiograph (must be extubated to be Grade 0)  Grade 1 - PaO2/FiO2 >300 and diffuse allograft infiltrates on chest radiograph  Grade 2 - PaO2/FiO2 between 200 and 300  Grade 3 - PaO2/FiO2 <200    Post-Op Data:  Complication Y/N Date Comments   Date of Extubation(s) N/A     Return to OR?      Reintubated?      Date Last Chest Tube Removed N/A     Rejection?      Afib?      Renal failure?      HCAP?      DVT/PE?      Native lung pathology results        Prophylaxis:  1) Bactrim  2) Valcyte    Prednisone Taper Plan:    3/15/18 12 12   3/22/18 12 10   4/26/18 10 10   5/24/18 10 7.5   6/22/18 7.5 7.5   7/20/18 7.5 5   8/24/18 5 5   9/21/18 5 2.5             Discharge:  Discharge to: &&&Home / TCU / ARU  Discharge date: &&&       ILD (interstitial lung disease) (H) 02/27/2018     Priority: Medium     Acute on chronic respiratory failure with hypoxia (H) 02/21/2018     Priority: Medium             Past Surgical History:     Past Surgical History:   Procedure Laterality Date     BRONCHOSCOPY (RIGID OR FLEXIBLE), DIAGNOSTIC N/A 4/10/2018    Procedure: COMBINED BRONCHOSCOPY (RIGID OR FLEXIBLE), LAVAGE;;  Surgeon: Mariposa Donhoue MD;  Location:  GI     BRONCHOSCOPY FLEXIBLE N/A 3/13/2018    Procedure: BRONCHOSCOPY FLEXIBLE;  Flexible Bronchoscopy ;  Surgeon: Gissell Sanchez MD;  Location:  GI     BRONCHOSCOPY FLEXIBLE N/A 5/9/2018    Procedure: BRONCHOSCOPY FLEXIBLE;;  Surgeon: Wilber Lin MD;  Location:  GI     ENT SURGERY      tonsillectomy as a child     INSERT EXTRACORPORAL MEMBRANE OXYGENATOR ADULT (OUTSIDE OR) N/A 2/27/2018    Procedure: INSERT EXTRACORPORAL MEMBRANE OXYGENATOR ADULT (OUTSIDE OR);  INSERT EXTRACORPORAL MEMBRANE OXYGENATOR ADULT (OUTSIDE OR) ;   Surgeon: Toby Hernandez MD;  Location: [facility] OR     no prior surgery       REMOVE EXTRACORPORAL MEMBRANE OXYGENATOR ADULT N/A 3/3/2018    Procedure: REMOVE EXTRACORPORAL MEMBRANE OXYGENATOR ADULT;  Removal of Right Femoral Venous and Right Axillary Arterial Extracorporeal Membrane Oxygenator;  Surgeon: Toby Hernandez MD;  Location: [facility] OR     TRANSPLANT LUNG RECIPIENT SINGLE X2 Bilateral 3/1/2018    Procedure: TRANSPLANT LUNG RECIPIENT SINGLE X2;  Median Sternotomy, Extracorporeal Membrane Oxygenator, bilateral sequential lung transplant;  Surgeon: Toby Hernandez MD;  Location: [facility] OR             Social History:     Social History   Substance Use Topics     Smoking status: Never Smoker     Smokeless tobacco: Never Used     Alcohol use No             Family History:     Family History   Problem Relation Age of Onset     Hypertension Mother      Arthritis Mother      Pancreatic Cancer Father      DIABETES Father             Allergies:   No Known Allergies          Medications:     Current Outpatient Prescriptions   Medication Sig Dispense Refill     calcium-vitamin D (CALTRATE) 600-400 MG-UNIT per tablet Take 1 tablet by mouth 2 times daily (with meals) 60 tablet 1     enoxaparin (LOVENOX) 60 MG/0.6ML injection Inject 60 mg subcutaneously every 12 hours as directed by Anticoagulation Clinic 10 Syringe 0     enoxaparin (LOVENOX) 60 MG/0.6ML injection Inject 60 mg subcutaneously every 12 hours or as directed by Anticoagulation Clinic. 25 Syringe 1     levalbuterol (XOPENEX HFA) 45 MCG/ACT Inhaler Inhale 2 puffs into the lungs every 6 hours as needed for shortness of breath / dyspnea or wheezing 1 Inhaler 1     magnesium oxide (MAG-OX) 400 MG tablet Take 1 tablet (400 mg) by mouth daily 30 tablet 1     metoprolol tartrate (LOPRESSOR) 25 MG tablet Take 1 tablet (25 mg) by mouth 2 times daily 60 tablet 1     multivitamin, therapeutic with minerals (THERA-VIT-M) TABS tablet Take 1 tablet by  "mouth daily 30 each 11     mycophenolate (GENERIC EQUIVALENT) 250 MG capsule Take 6 capsules (1,500 mg) by mouth 2 times daily 360 capsule 11     nystatin (MYCOSTATIN) 871010 UNIT/ML suspension Take 10 mLs (1,000,000 Units) by mouth 4 times daily 1200 mL 5     order for DME Equipment being ordered: Oxygen 5 liters at rest, 15 liters with exertion.  Needs portability.  Please provide 2 10-liter concentrators for in the home 1 Device prn     pantoprazole (PROTONIX) 40 MG EC tablet Take 1 tablet (40 mg) by mouth every morning 30 tablet 1     predniSONE (DELTASONE) 5 MG tablet Take 10 mg twice daily until 5/24, follow taper 300 tablet 3     sulfamethoxazole-trimethoprim (BACTRIM/SEPTRA) 400-80 MG per tablet 1 tablet by Oral or NG Tube route daily 30 tablet 11     tacrolimus (GENERIC EQUIVALENT) 1 MG capsule Take 5 mg in the AM and 6 mg in the  capsule 11     valGANciclovir (VALCYTE) 450 MG tablet Take 2 tablets (900 mg) by mouth daily 60 tablet 6     warfarin (COUMADIN) 1 MG tablet Take 4mg MWFSat and 6mg TuThSun, or as directed by the Medication Monitoring [facility] at the [facility]. 140 tablet 2     polyethylene glycol (MIRALAX/GLYCOLAX) Packet Take 17 g by mouth 2 times daily as needed for constipation 60 packet 0     senna-docusate (SENOKOT-S;PERICOLACE) 8.6-50 MG per tablet Take 2 tablets by mouth 2 times daily as needed for constipation 100 tablet 0            Physical Exam:   Blood pressure 137/84, pulse 70, temperature 98.4  F (36.9  C), temperature source Oral, height 1.626 m (5' 4\"), weight 56.1 kg (123 lb 9.6 oz), last menstrual period 06/07/2014, SpO2 96 %.  Constitutional: WD-WN-WG cooperative  Eyes: nl EOM, PERRLA, vision, conjunctiva, sclera  ENT: nl external ears, nose, hearing, lips, teeth, gums, throat  No mucous membrane lesions, normal saliva pool  Neck: no mass or thyroid enlargement  Resp: lungs clear to auscultation, nl to palpation  CV: RRR, no murmurs, rubs or gallops, no edema  GI: no ABD mass " or tenderness, no HSM  : not tested  Lymph: no cervical, supraclavicular, inguinal or epitrochlear nodes  MS: All TMJ, neck, shoulder, elbow, wrist, MCP/PIP/DIP, spine, hip, knee, ankle, and foot MTP/IP joints were examined and  found normal. No active synovitis or deformity. Full ROM.  Normal  strength. No dactylitis,  tenosynovitis, enthespathy   Skin: She has no active dermatomyositis-like rashes: She does have 3+ skin thickening in her fingers that would be consistent with systemic sclerosis and also has 2-3+ skin thickening over her ankles with minimal skin thickening more distally in the feet or proximally in the legs or arms.  She does have active cyanosis in the hands less notably in the feet.  No nail pitting, alopecia, rash, nodules or lesions  Neuro: Her strength is near normal perhaps 4- out of 5 in the hip flexors with prolonged effort.  Nl cranial nerves, , sensation, DTRs.   Psych: nl judgement, orientation, memory, affect.         Data:     Lab Results   Component Value Date    WBC 7.7 05/08/2018    WBC 8.1 04/30/2018    WBC 7.9 04/26/2018    HGB 9.6 (L) 05/08/2018    HGB 9.7 (L) 04/30/2018    HGB 9.7 (L) 04/26/2018    HCT 31.3 (L) 05/08/2018    HCT 31.1 (L) 04/30/2018    HCT 31.1 (L) 04/26/2018     (H) 05/08/2018     (H) 04/30/2018     (H) 04/26/2018     05/08/2018     (H) 04/30/2018     (H) 04/26/2018     Lab Results   Component Value Date    BUN 23 05/08/2018    BUN 25 04/30/2018    BUN 26 04/26/2018     No components found for: SEDRATE  Lab Results   Component Value Date    TSH 3.07 02/19/2018     Lab Results   Component Value Date    AST 34 03/03/2018    AST 38 03/01/2018    AST 58 (H) 03/01/2018    ALT 29 03/24/2018    ALT 28 03/23/2018    ALT 24 03/22/2018    ALKPHOS 27 (L) 03/03/2018    ALKPHOS 35 (L) 03/01/2018    ALKPHOS 37 (L) 03/01/2018     Reviewed Rheumatology lab flowsheet    Franklyn Jolly

## 2018-05-11 ENCOUNTER — ANTICOAGULATION THERAPY VISIT (OUTPATIENT)
Dept: ANTICOAGULATION | Facility: CLINIC | Age: 58
End: 2018-05-11

## 2018-05-11 ENCOUNTER — HOSPITAL ENCOUNTER (OUTPATIENT)
Dept: CARDIAC REHAB | Facility: CLINIC | Age: 58
End: 2018-05-11
Attending: PHYSICAL MEDICINE & REHABILITATION
Payer: COMMERCIAL

## 2018-05-11 VITALS — WEIGHT: 121 LBS | BODY MASS INDEX: 20.77 KG/M2

## 2018-05-11 DIAGNOSIS — I82.622 ACUTE DEEP VEIN THROMBOSIS (DVT) OF LEFT UPPER EXTREMITY, UNSPECIFIED VEIN (H): ICD-10-CM

## 2018-05-11 DIAGNOSIS — Z94.2 LUNG TRANSPLANT RECIPIENT (H): ICD-10-CM

## 2018-05-11 LAB
DONOR IDENTIFICATION: NORMAL
DQB7: 1797
DSA COMMENTS: NORMAL
DSA PRESENT: YES
DSA TEST METHOD: NORMAL
ORGAN: NORMAL
SA1 CELL: NORMAL
SA1 COMMENTS: NORMAL
SA1 HI RISK ABY: NORMAL
SA1 MOD RISK ABY: NORMAL
SA1 TEST METHOD: NORMAL
SA2 CELL: NORMAL
SA2 COMMENTS: NORMAL
SA2 HI RISK ABY UA: NORMAL
SA2 MOD RISK ABY: NORMAL
SA2 TEST METHOD: NORMAL

## 2018-05-11 PROCEDURE — G0239 OTH RESP PROC, GROUP: HCPCS

## 2018-05-11 PROCEDURE — 40000244 ZZH STATISTIC VISIT PULM REHAB

## 2018-05-11 NOTE — PROGRESS NOTES
ANTICOAGULATION FOLLOW-UP CLINIC VISIT    Patient Name:  Kecia Blue  Date:  5/11/2018  Contact Type:  Telephone    SUBJECTIVE:     Patient Findings     Comments Pt phones on this date to report she will be going home for a few days & wants the next INR to be on 5/16.  Dosing for coumadin provided for 5/15           OBJECTIVE    INR   Date Value Ref Range Status   05/08/2018 1.08 0.86 - 1.14 Final       ASSESSMENT / PLAN  No question data found.  Anticoagulation Summary as of 5/11/2018     INR goal 2.0-3.0   Today's INR No new INR was available at the time of this encounter.   Maintenance plan No maintenance plan   Full instructions 5/11: 6 mg; 5/12: 4 mg; 5/13: 4 mg; 5/14: 4 mg; 5/15: 4 mg; Otherwise No maintenance plan   Next INR check 5/16/2018   Priority INR   Target end date     Indications   Lung transplant recipient (H) [Z94.2]  Deep vein thrombosis (DVT) (H) [I82.409] [I82.409]         Anticoagulation Episode Summary     INR check location Clinic Lab    Preferred lab     Send INR reminders to [facility]IC    Comments Plan for 3 months of therapy- Provoked DVT. Welcome package sent . Patient staying at [facility] HIPPA OK and  Verbal OK to speak with  and leave messages on Cell [phone]      Anticoagulation Care Providers     Provider Role Specialty Phone number    Ame Chow PA-C Responsible Pulmonary [phone]            See the Encounter Report to view Anticoagulation Flowsheet and Dosing Calendar (Go to Encounters tab in chart review, and find the Anticoagulation Therapy Visit)    See above notation     Judith Salazar RN

## 2018-05-11 NOTE — MR AVS SNAPSHOT
Kecia Blue   5/11/2018   Anticoagulation Therapy Visit    Description:  57 year old female   Provider:  Judith Salazar, RN   [facility]           INR as of 5/11/2018     Today's INR No new INR was available at the time of this encounter.      Anticoagulation Summary as of 5/11/2018     INR goal 2.0-3.0   Today's INR No new INR was available at the time of this encounter.   Full instructions 5/11: 6 mg; 5/12: 4 mg; 5/13: 4 mg; 5/14: 4 mg; 5/15: 4 mg; Otherwise No maintenance plan   Next INR check 5/16/2018    Indications   Lung transplant recipient (H) [Z94.2]  Deep vein thrombosis (DVT) (H) [I82.409] [I82.409]         May 2018 Details    Sun Mon Tue Wed Thu Fri Sat       1               2               3               4               5                 6               7               8               9               10               11      6 mg   See details      12      4 mg           13      4 mg         14      4 mg         15      4 mg         16            17               18               19                 20               21               22               23               24               25               26                 27               28               29               30               31                  Date Details   05/11 This INR check       Date of next INR:  5/16/2018         How to take your warfarin dose     To take:  4 mg Take 4 of the 1 mg tablets.    To take:  6 mg Take 6 of the 1 mg tablets.

## 2018-05-15 ENCOUNTER — TELEPHONE (OUTPATIENT)
Dept: TRANSPLANT | Facility: CLINIC | Age: 58
End: 2018-05-15

## 2018-05-15 DIAGNOSIS — R06.02 SOB (SHORTNESS OF BREATH): Primary | ICD-10-CM

## 2018-05-15 DIAGNOSIS — R06.2 WHEEZING: ICD-10-CM

## 2018-05-16 ENCOUNTER — RESULTS ONLY (OUTPATIENT)
Dept: OTHER | Facility: CLINIC | Age: 58
End: 2018-05-16

## 2018-05-16 ENCOUNTER — ANTICOAGULATION THERAPY VISIT (OUTPATIENT)
Dept: ANTICOAGULATION | Facility: CLINIC | Age: 58
End: 2018-05-16

## 2018-05-16 DIAGNOSIS — Z94.2 S/P LUNG TRANSPLANT (H): ICD-10-CM

## 2018-05-16 DIAGNOSIS — R76.8 ANA POSITIVE: ICD-10-CM

## 2018-05-16 DIAGNOSIS — M33.13 DERMATOMYOSITIS (H): ICD-10-CM

## 2018-05-16 DIAGNOSIS — Z79.899 ENCOUNTER FOR LONG-TERM (CURRENT) USE OF HIGH-RISK MEDICATION: ICD-10-CM

## 2018-05-16 DIAGNOSIS — I82.622 ACUTE DEEP VEIN THROMBOSIS (DVT) OF LEFT UPPER EXTREMITY, UNSPECIFIED VEIN (H): ICD-10-CM

## 2018-05-16 DIAGNOSIS — J84.9 ILD (INTERSTITIAL LUNG DISEASE) (H): ICD-10-CM

## 2018-05-16 DIAGNOSIS — D64.9 ANEMIA, UNSPECIFIED TYPE: ICD-10-CM

## 2018-05-16 DIAGNOSIS — Z94.2 LUNG TRANSPLANT RECIPIENT (H): ICD-10-CM

## 2018-05-16 LAB
ANION GAP SERPL CALCULATED.3IONS-SCNC: 7 MMOL/L (ref 3–14)
BUN SERPL-MCNC: 19 MG/DL (ref 7–30)
C3 SERPL-MCNC: 113 MG/DL (ref 76–169)
CALCIUM SERPL-MCNC: 9.6 MG/DL (ref 8.5–10.1)
CENTROMERE IGG SER-ACNC: <0.2 AI (ref 0–0.9)
CHLORIDE SERPL-SCNC: 102 MMOL/L (ref 94–109)
CO2 SERPL-SCNC: 26 MMOL/L (ref 20–32)
CREAT SERPL-MCNC: 1.06 MG/DL (ref 0.52–1.04)
ENA RNP IGG SER IA-ACNC: 0.2 AI (ref 0–0.9)
ENA SCL70 IGG SER IA-ACNC: <0.2 AI (ref 0–0.9)
ENA SM IGG SER-ACNC: <0.2 AI (ref 0–0.9)
ENA SS-A IGG SER IA-ACNC: 2.8 AI (ref 0–0.9)
ENA SS-B IGG SER IA-ACNC: 7.3 AI (ref 0–0.9)
ERYTHROCYTE [DISTWIDTH] IN BLOOD BY AUTOMATED COUNT: 14.6 % (ref 10–15)
EXPTIME-PRE: 6.83 SEC
FEF2575-%PRED-PRE: 37 %
FEF2575-PRE: 0.92 L/SEC
FEF2575-PRED: 2.45 L/SEC
FEFMAX-%PRED-PRE: 57 %
FEFMAX-PRE: 3.72 L/SEC
FEFMAX-PRED: 6.45 L/SEC
FEV1-%PRED-PRE: 45 %
FEV1-PRE: 1.17 L
FEV1FEV6-PRE: 76 %
FEV1FEV6-PRED: 81 %
FEV1FVC-PRE: 76 %
FEV1FVC-PRED: 79 %
FIFMAX-PRE: 3.21 L/SEC
FVC-%PRED-PRE: 47 %
FVC-PRE: 1.53 L
FVC-PRED: 3.25 L
GFR SERPL CREATININE-BSD FRML MDRD: 54 ML/MIN/1.7M2
GLUCOSE SERPL-MCNC: 104 MG/DL (ref 70–99)
HCT VFR BLD AUTO: 33.2 % (ref 35–47)
HGB BLD-MCNC: 10.3 G/DL (ref 11.7–15.7)
INR PPP: 1.96 (ref 0.86–1.14)
MAGNESIUM SERPL-MCNC: 2 MG/DL (ref 1.6–2.3)
MCH RBC QN AUTO: 31.1 PG (ref 26.5–33)
MCHC RBC AUTO-ENTMCNC: 31 G/DL (ref 31.5–36.5)
MCV RBC AUTO: 100 FL (ref 78–100)
MISCELLANEOUS TEST: NORMAL
PLATELET # BLD AUTO: 479 10E9/L (ref 150–450)
POTASSIUM SERPL-SCNC: 4.4 MMOL/L (ref 3.4–5.3)
RBC # BLD AUTO: 3.31 10E12/L (ref 3.8–5.2)
RESULT: NORMAL
SEND OUTS MISC TEST CODE: NORMAL
SEND OUTS MISC TEST SPECIMEN: NORMAL
SODIUM SERPL-SCNC: 136 MMOL/L (ref 133–144)
TACROLIMUS BLD-MCNC: 9.1 UG/L (ref 5–15)
TEST NAME: NORMAL
TME LAST DOSE: NORMAL H
WBC # BLD AUTO: 8.9 10E9/L (ref 4–11)

## 2018-05-16 RX ORDER — ALBUTEROL SULFATE 90 UG/1
2 AEROSOL, METERED RESPIRATORY (INHALATION) EVERY 6 HOURS PRN
Qty: 1 INHALER | Refills: 1 | Status: SHIPPED | OUTPATIENT
Start: 2018-05-16 | End: 2018-05-21

## 2018-05-16 NOTE — TELEPHONE ENCOUNTER
Patient has old albuterol inhaler and wonder if she could have a refill. She does not use it often but likes to have it on hand.

## 2018-05-16 NOTE — MR AVS SNAPSHOT
Kecia Blue   5/16/2018   Anticoagulation Therapy Visit    Description:  57 year old female   Provider:  Joshua Davis   Department:  [facility]           INR as of 5/16/2018     Today's INR 1.96!      Anticoagulation Summary as of 5/16/2018     INR goal 2.0-3.0   Today's INR 1.96!   Full instructions 5/16: 4 mg; Otherwise No maintenance plan   Next INR check 5/17/2018    Indications   Lung transplant recipient (H) [Z94.2]  Deep vein thrombosis (DVT) (H) [I82.409] [I82.409]         May 2018 Details    Sun Mon Tue Wed Thu Fri Sat       1               2               3               4               5                 6               7               8               9               10               11               12                 13               14               15               16      4 mg   See details      17            18               19                 20               21               22               23               24               25               26                 27               28               29               30               31                  Date Details   05/16 This INR check       Date of next INR:  5/17/2018         How to take your warfarin dose     To take:  4 mg Take 4 of the 1 mg tablets.

## 2018-05-16 NOTE — PROGRESS NOTES
ANTICOAGULATION FOLLOW-UP CLINIC VISIT    Patient Name:  Kecia Blue  Date:  5/16/2018  Contact Type:  Telephone    SUBJECTIVE:     Patient Findings     Positives Change in medications (Instructed Kecia to take Lovenox doses today.)    Comments Spoke to Kecia.  She will continue to inject Lovenox today, then check an INR tomorrow.  Requested that Kecia have two therapeutic INR results documented.  Today is considered a therapeutic INR.  Will look to discontinue Lovenox tomorrow.           OBJECTIVE    INR   Date Value Ref Range Status   05/16/2018 1.96 (H) 0.86 - 1.14 Final       ASSESSMENT / PLAN  INR assessment THER    Recheck INR In: 1 DAY    INR Location Clinic      Anticoagulation Summary as of 5/16/2018     INR goal 2.0-3.0   Today's INR 1.96!   Maintenance plan No maintenance plan   Full instructions 5/16: 4 mg; Otherwise No maintenance plan   Next INR check 5/17/2018   Priority INR   Target end date     Indications   Lung transplant recipient (H) [Z94.2]  Deep vein thrombosis (DVT) (H) [I82.409] [I82.409]         Anticoagulation Episode Summary     INR check location Clinic Lab    Preferred lab     Send INR reminders to [facility]    Comments Plan for 3 months of therapy- Provoked DVT. Welcome package sent . Patient staying at [facility]IPPA OK and  Verbal OK to speak with  and leave messages on Cell [phone]      Anticoagulation Care Providers     Provider Role Specialty Phone number    Ame Chow PA-C Responsible Pulmonary [phone]            See the Encounter Report to view Anticoagulation Flowsheet and Dosing Calendar (Go to Encounters tab in chart review, and find the Anticoagulation Therapy Visit)    Spoke to Kecia.  Suspect tomorrow will be the second therapeutic INR recorded.  Patient should be able to discontinue Lovenox tomorrow.    Joshua Davis RN

## 2018-05-17 ENCOUNTER — ANTICOAGULATION THERAPY VISIT (OUTPATIENT)
Dept: ANTICOAGULATION | Facility: CLINIC | Age: 58
End: 2018-05-17

## 2018-05-17 DIAGNOSIS — Z20.828 CONTACT WITH OR EXPOSURE TO VIRAL DISEASE: Primary | ICD-10-CM

## 2018-05-17 DIAGNOSIS — Z94.2 LUNG TRANSPLANT RECIPIENT (H): ICD-10-CM

## 2018-05-17 DIAGNOSIS — I82.622 ACUTE DEEP VEIN THROMBOSIS (DVT) OF LEFT UPPER EXTREMITY, UNSPECIFIED VEIN (H): ICD-10-CM

## 2018-05-17 DIAGNOSIS — I82.409 DEEP VEIN THROMBOSIS (DVT) (H): ICD-10-CM

## 2018-05-17 LAB
ANA SER QL IF: NEGATIVE
CMV DNA SPEC NAA+PROBE-ACNC: NORMAL [IU]/ML
CMV DNA SPEC NAA+PROBE-LOG#: NORMAL {LOG_IU}/ML
INR PPP: 2.04 (ref 0.86–1.14)
MYCOBACTERIUM SPEC CULT: NORMAL
MYCOBACTERIUM SPEC CULT: NORMAL
PRA DONOR SPECIFIC ABY: NORMAL
SPECIMEN SOURCE: NORMAL
SPECIMEN SOURCE: NORMAL

## 2018-05-17 NOTE — PROGRESS NOTES
ANTICOAGULATION FOLLOW-UP CLINIC VISIT    Patient Name:  Kecia Blue  Date:  5/17/2018  Contact Type:  Telephone    SUBJECTIVE:     Patient Findings     Comments Instructions were given to stop Lovenox.            OBJECTIVE    INR   Date Value Ref Range Status   05/17/2018 2.04 (H) 0.86 - 1.14 Final       ASSESSMENT / PLAN  No question data found.  Anticoagulation Summary as of 5/17/2018     INR goal 2.0-3.0   Today's INR 2.04   Maintenance plan No maintenance plan   Full instructions 5/17: 6 mg; 5/18: 4 mg; 5/19: 4 mg; 5/20: 4 mg; Otherwise No maintenance plan   Next INR check 5/21/2018   Priority INR   Target end date     Indications   Lung transplant recipient (H) [Z94.2]  Deep vein thrombosis (DVT) (H) [I82.409] [I82.409]         Anticoagulation Episode Summary     INR check location Clinic Lab    Preferred lab     Send INR reminders to [facility]    Comments Plan for 3 months of therapy- Provoked DVT. Welcome package sent . Patient staying at [facility]IPPA OK and  Verbal OK to speak with  and leave messages on Cell [phone]      Anticoagulation Care Providers     Provider Role Specialty Phone number    Ame Chow PA-C Responsible Pulmonary [phone]            See the Encounter Report to view Anticoagulation Flowsheet and Dosing Calendar (Go to Encounters tab in chart review, and find the Anticoagulation Therapy Visit)    Spoke with Kecia.     Katie Bush RN

## 2018-05-17 NOTE — PROCEDURES
Addendum to bronchoscopy Note from 5/9/2018:    Indication: s/p lung transplantation, airway examination of anastomosis    Alana Hernández MD

## 2018-05-17 NOTE — MR AVS SNAPSHOT
Kecia Blue   5/17/2018   Anticoagulation Therapy Visit    Description:  57 year old female   Provider:  Katie Bush, RN   Department:  [facility]           INR as of 5/17/2018     Today's INR 2.04      Anticoagulation Summary as of 5/17/2018     INR goal 2.0-3.0   Today's INR 2.04   Full instructions 5/17: 6 mg; 5/18: 4 mg; 5/19: 4 mg; 5/20: 4 mg; Otherwise No maintenance plan   Next INR check 5/21/2018    Indications   Lung transplant recipient (H) [Z94.2]  Deep vein thrombosis (DVT) (H) [I82.409] [I82.409]         May 2018 Details    Sun Mon Tue Wed Thu Fri Sat       1               2               3               4               5                 6               7               8               9               10               11               12                 13               14               15               16               17      6 mg   See details      18      4 mg         19      4 mg           20      4 mg         21            22               23               24               25               26                 27               28               29               30               31                  Date Details   05/17 This INR check       Date of next INR:  5/21/2018         How to take your warfarin dose     To take:  4 mg Take 4 of the 1 mg tablets.    To take:  6 mg Take 6 of the 1 mg tablets.

## 2018-05-17 NOTE — ADDENDUM NOTE
Encounter addended by: Wilber Lin MD on: 5/17/2018 10:58 AM<BR>     Actions taken: Sign clinical note

## 2018-05-18 ENCOUNTER — TELEPHONE (OUTPATIENT)
Dept: TRANSPLANT | Facility: CLINIC | Age: 58
End: 2018-05-18

## 2018-05-18 ENCOUNTER — HOSPITAL ENCOUNTER (OUTPATIENT)
Dept: CARDIAC REHAB | Facility: CLINIC | Age: 58
End: 2018-05-18
Attending: PHYSICAL MEDICINE & REHABILITATION
Payer: COMMERCIAL

## 2018-05-18 VITALS — WEIGHT: 123 LBS | BODY MASS INDEX: 21.11 KG/M2

## 2018-05-18 DIAGNOSIS — I48.0 PAROXYSMAL ATRIAL FIBRILLATION (H): ICD-10-CM

## 2018-05-18 DIAGNOSIS — E83.42 HYPOMAGNESEMIA: ICD-10-CM

## 2018-05-18 DIAGNOSIS — K21.9 GASTROESOPHAGEAL REFLUX DISEASE WITHOUT ESOPHAGITIS: ICD-10-CM

## 2018-05-18 PROCEDURE — G0239 OTH RESP PROC, GROUP: HCPCS

## 2018-05-18 PROCEDURE — 40000244 ZZH STATISTIC VISIT PULM REHAB

## 2018-05-18 RX ORDER — MAGNESIUM OXIDE 400 MG/1
400 TABLET ORAL DAILY
Qty: 30 TABLET | Refills: 11 | Status: SHIPPED | OUTPATIENT
Start: 2018-05-18 | End: 2018-05-21

## 2018-05-18 RX ORDER — METOPROLOL TARTRATE 25 MG/1
25 TABLET, FILM COATED ORAL 2 TIMES DAILY
Qty: 60 TABLET | Refills: 11 | Status: SHIPPED | OUTPATIENT
Start: 2018-05-18 | End: 2018-05-21

## 2018-05-18 RX ORDER — PANTOPRAZOLE SODIUM 40 MG/1
40 TABLET, DELAYED RELEASE ORAL EVERY MORNING
Qty: 30 TABLET | Refills: 11 | Status: SHIPPED | OUTPATIENT
Start: 2018-05-18 | End: 2018-05-21

## 2018-05-18 NOTE — TELEPHONE ENCOUNTER
Tacrolimus level 9.1 at 13.5 hours, on 5/17  Goal 10-15.   Current dose 5 mg in AM, 6 mg in PM    No dose changes, discussed with patient.      Dr. Riley cleared patient to go home for the weekend. She has RTC on Monday. She'll go to [place] [facility] if she develops any SOB or if her o2 sats decrease.

## 2018-05-20 LAB — RNAP III IGG SERPL IA-ACNC: 4 UNITS (ref 0–19)

## 2018-05-21 ENCOUNTER — ANTICOAGULATION THERAPY VISIT (OUTPATIENT)
Dept: ANTICOAGULATION | Facility: CLINIC | Age: 58
End: 2018-05-21

## 2018-05-21 ENCOUNTER — CARE COORDINATION (OUTPATIENT)
Dept: TRANSPLANT | Facility: CLINIC | Age: 58
End: 2018-05-21

## 2018-05-21 ENCOUNTER — OFFICE VISIT (OUTPATIENT)
Dept: TRANSPLANT | Facility: CLINIC | Age: 58
End: 2018-05-21
Attending: INTERNAL MEDICINE
Payer: COMMERCIAL

## 2018-05-21 ENCOUNTER — RADIANT APPOINTMENT (OUTPATIENT)
Dept: GENERAL RADIOLOGY | Facility: CLINIC | Age: 58
End: 2018-05-21
Attending: INTERNAL MEDICINE
Payer: COMMERCIAL

## 2018-05-21 VITALS
TEMPERATURE: 98.2 F | HEIGHT: 64 IN | DIASTOLIC BLOOD PRESSURE: 94 MMHG | HEART RATE: 97 BPM | RESPIRATION RATE: 16 BRPM | OXYGEN SATURATION: 97 % | WEIGHT: 119.3 LBS | BODY MASS INDEX: 20.37 KG/M2 | SYSTOLIC BLOOD PRESSURE: 147 MMHG

## 2018-05-21 DIAGNOSIS — R93.89 ABNORMAL CHEST CT: ICD-10-CM

## 2018-05-21 DIAGNOSIS — Z94.2 LUNG TRANSPLANT RECIPIENT (H): ICD-10-CM

## 2018-05-21 DIAGNOSIS — I48.0 PAROXYSMAL ATRIAL FIBRILLATION (H): ICD-10-CM

## 2018-05-21 DIAGNOSIS — R06.2 WHEEZING: ICD-10-CM

## 2018-05-21 DIAGNOSIS — R06.02 SOB (SHORTNESS OF BREATH): ICD-10-CM

## 2018-05-21 DIAGNOSIS — J96.21 ACUTE ON CHRONIC RESPIRATORY FAILURE WITH HYPOXIA (H): ICD-10-CM

## 2018-05-21 DIAGNOSIS — I82.622 ACUTE DEEP VEIN THROMBOSIS (DVT) OF LEFT UPPER EXTREMITY, UNSPECIFIED VEIN (H): ICD-10-CM

## 2018-05-21 DIAGNOSIS — Z94.2 LUNG REPLACED BY TRANSPLANT (H): Primary | ICD-10-CM

## 2018-05-21 DIAGNOSIS — Z94.2 LUNG REPLACED BY TRANSPLANT (H): ICD-10-CM

## 2018-05-21 DIAGNOSIS — K21.9 GASTROESOPHAGEAL REFLUX DISEASE WITHOUT ESOPHAGITIS: ICD-10-CM

## 2018-05-21 DIAGNOSIS — J84.9 ILD (INTERSTITIAL LUNG DISEASE) (H): ICD-10-CM

## 2018-05-21 DIAGNOSIS — E83.42 HYPOMAGNESEMIA: ICD-10-CM

## 2018-05-21 DIAGNOSIS — I82.409 DVT (DEEP VENOUS THROMBOSIS) (H): ICD-10-CM

## 2018-05-21 DIAGNOSIS — Z94.2 S/P LUNG TRANSPLANT (H): ICD-10-CM

## 2018-05-21 DIAGNOSIS — Z76.82 ORGAN TRANSPLANT CANDIDATE: ICD-10-CM

## 2018-05-21 DIAGNOSIS — I95.9 HYPOTENSION, UNSPECIFIED HYPOTENSION TYPE: ICD-10-CM

## 2018-05-21 DIAGNOSIS — I82.409 DEEP VEIN THROMBOSIS (DVT) (H): ICD-10-CM

## 2018-05-21 DIAGNOSIS — J84.9 LUNG DISEASE, INTERSTITIAL (H): ICD-10-CM

## 2018-05-21 DIAGNOSIS — Z94.2 STATUS POST LUNG TRANSPLANTATION (H): ICD-10-CM

## 2018-05-21 DIAGNOSIS — D64.9 ANEMIA, UNSPECIFIED TYPE: ICD-10-CM

## 2018-05-21 DIAGNOSIS — Z94.2 LUNG TRANSPLANT RECIPIENT (H): Primary | ICD-10-CM

## 2018-05-21 DIAGNOSIS — K59.09 OTHER CONSTIPATION: ICD-10-CM

## 2018-05-21 LAB
ANION GAP SERPL CALCULATED.3IONS-SCNC: 9 MMOL/L (ref 3–14)
BUN SERPL-MCNC: 38 MG/DL (ref 7–30)
CALCIUM SERPL-MCNC: 9.3 MG/DL (ref 8.5–10.1)
CHLORIDE SERPL-SCNC: 103 MMOL/L (ref 94–109)
CO2 SERPL-SCNC: 25 MMOL/L (ref 20–32)
CREAT SERPL-MCNC: 1.19 MG/DL (ref 0.52–1.04)
ERYTHROCYTE [DISTWIDTH] IN BLOOD BY AUTOMATED COUNT: 14.7 % (ref 10–15)
EXPTIME-PRE: 7.14 SEC
FEF2575-%PRED-PRE: 35 %
FEF2575-PRE: 0.87 L/SEC
FEF2575-PRED: 2.45 L/SEC
FEFMAX-%PRED-PRE: 48 %
FEFMAX-PRE: 3.15 L/SEC
FEFMAX-PRED: 6.44 L/SEC
FEV1-%PRED-PRE: 42 %
FEV1-PRE: 1.09 L
FEV1FEV6-PRE: 78 %
FEV1FEV6-PRED: 81 %
FEV1FVC-PRE: 78 %
FEV1FVC-PRED: 79 %
FIFMAX-PRE: 2.79 L/SEC
FVC-%PRED-PRE: 43 %
FVC-PRE: 1.4 L
FVC-PRED: 3.25 L
GFR SERPL CREATININE-BSD FRML MDRD: 47 ML/MIN/1.7M2
GLUCOSE SERPL-MCNC: 105 MG/DL (ref 70–99)
HCT VFR BLD AUTO: 31.6 % (ref 35–47)
HGB BLD-MCNC: 9.9 G/DL (ref 11.7–15.7)
INR PPP: 2.78 (ref 0.86–1.14)
MCH RBC QN AUTO: 31.7 PG (ref 26.5–33)
MCHC RBC AUTO-ENTMCNC: 31.3 G/DL (ref 31.5–36.5)
MCV RBC AUTO: 101 FL (ref 78–100)
PLATELET # BLD AUTO: 471 10E9/L (ref 150–450)
POTASSIUM SERPL-SCNC: 4.9 MMOL/L (ref 3.4–5.3)
RBC # BLD AUTO: 3.12 10E12/L (ref 3.8–5.2)
SODIUM SERPL-SCNC: 136 MMOL/L (ref 133–144)
TACROLIMUS BLD-MCNC: 10.7 UG/L (ref 5–15)
TME LAST DOSE: NORMAL H
WBC # BLD AUTO: 10 10E9/L (ref 4–11)

## 2018-05-21 PROCEDURE — 80197 ASSAY OF TACROLIMUS: CPT | Performed by: INTERNAL MEDICINE

## 2018-05-21 PROCEDURE — G0463 HOSPITAL OUTPT CLINIC VISIT: HCPCS | Mod: ZF

## 2018-05-21 PROCEDURE — 80048 BASIC METABOLIC PNL TOTAL CA: CPT | Performed by: PHYSICIAN ASSISTANT

## 2018-05-21 PROCEDURE — 85610 PROTHROMBIN TIME: CPT | Performed by: PHYSICIAN ASSISTANT

## 2018-05-21 PROCEDURE — 36415 COLL VENOUS BLD VENIPUNCTURE: CPT | Performed by: PHYSICIAN ASSISTANT

## 2018-05-21 PROCEDURE — 85027 COMPLETE CBC AUTOMATED: CPT | Performed by: PHYSICIAN ASSISTANT

## 2018-05-21 RX ORDER — PREDNISONE 2.5 MG/1
2.5 TABLET ORAL DAILY
Qty: 60 TABLET | Refills: 1 | Status: SHIPPED | OUTPATIENT
Start: 2018-05-21 | End: 2018-07-05

## 2018-05-21 ASSESSMENT — PAIN SCALES - GENERAL: PAINLEVEL: MILD PAIN (3)

## 2018-05-21 NOTE — MR AVS SNAPSHOT
After Visit Summary   5/21/2018    Kecia Blue    MRN: [id number]           Patient Information     Date Of Birth          1960        Visit Information        Provider Department      5/21/2018 8:00 AM Alex Hale MD [facility] Solid Organ Transplant        Today's Diagnoses     Acute deep vein thrombosis (DVT) of left upper extremity, unspecified vein (H)        Lung transplant recipient (H)          Care Instructions    Patient Instructions  1. Evelyn and you will arrange discharge education clinic appt.  2. Continue coumadin and lovenox as ordered until we check US in June.  3. We usually have our patients take the nystatin SS for 6 months after your transplant.  4. Continue to hydrate with 72 oz daily.  5. Can make an ENT appt locally for vocal cord polyp.    Next transplant clinic appointment:  6/5/18 with CXR, labs and PFTs, and US left arm.  Next lab draw: 1-2 weeks or as per Evelyn        ~~~~~~~~~~~~~~~~~~~~~~~~~    Thoracic Transplant Office phone [phone], fax [phone]    Office Hours 8:30 - 5:00     For after-hours urgent issues, please dial [phone], and ask to speak with the Thoracic Transplant Coordinator  On-Call, pager [phone].  --------------------  To expedite your medication refill(s), please contact your pharmacy and have them fax a refill request to: [phone]  .   *Please allow 3 business days for routine medication refills.  *Please allow 5 business days for controlled substance medication refills.    **For Diabetic medications and supplies refill(s), please contact your pharmacy and have them  Contact your Endocrine team.  --------------------  For scheduling appointments call [facility] transplant :  [phone]. For lab appointments call [phone] or [facility].  --------------------  Please Note: If you are active on [employer], all future test results will be sent by [employer] message only, and will no longer be called to patient. You may  also receive communication directly from your physician.            Follow-ups after your visit        Your next 10 appointments already scheduled     May 23, 2018 10:00 AM CDT   Pulmonary Treatment with UR PULMONARY REHAB 2   [facility], Cardiac Rehabilitation ([facility])    [address] [place] [address] 1st Floor 19  [facility] [address]   [phone]            May 25, 2018 10:00 AM CDT   Pulmonary Treatment with UR PULMONARY REHAB 2   [facility], Cardiac Rehabilitation ([facility])    [address] [place] [address] [address]st Floor [address] [address]   [phone]            May 30, 2018 10:00 AM CDT   Pulmonary Treatment with UR PULMONARY REHAB 2   [facility], Cardiac Rehabilitation ([facility])    [address] [place] [address] [address] [address]   [phone]            Jun 01, 2018 10:00 AM CDT   Pulmonary Treatment with UR PULMONARY REHAB 2   [facility], Cardiac Rehabilitation ([facility])    [address] [place] [address]   [phone]            Jun 05, 2018  1:30 PM CDT   PFT VISIT with  PFL A   [facility] Pulmonary Function Testing ([facility])    [address] [place] [id number] [phone]            Jun 05, 2018  2:15 PM CDT   Lab with  LAB    [facility] ([facility])    [address] [id number] [phone]            Jun 05, 2018  2:45 PM CDT   XR CHEST 2 VIEWS with XR1   [facility] Imaging Center Xray ([facility])    [address] [place] [id number] [phone]           Please bring a list of your current medicines to your  exam. (Include vitamins, minerals and over-thecounter medicines.) Leave your valuables at home.  Tell your doctor if there is a chance you may be pregnant.  You do not need to do anything special for this exam.            Jun 05, 2018  3:50 PM CDT   (Arrive by 3:35 PM)   Return Lung Transplant with Tarik Christensen MD   [facility] Solid Organ Transplant ([facility])    [address]  3rd Floor  [facility] [address] [phone]            Jun 05, 2018  5:00 PM CDT   US VENOUS with UCUSV1   [facility] Imaging Center US ([facility])    [address] [facility] [address]  1st Floor  [facility] [address] [phone]           Please bring a list of your medicines (including vitamins, minerals and over-the-counter drugs). Also, tell your doctor about any allergies you may have. Wear comfortable clothes and leave your valuables at home.  You do not need to do anything special to prepare for your exam.  Please call the Imaging Department at your exam site with any questions.            Jun 06, 2018   Procedure with Lopez Macias MD   [facility] [place], Endoscopy ([facility])    [address] [facility] [address]   [phone]           The [facility] is located on the corner of [facility] [address] [facility] and [address] [facility] on the [facility]. It is easily accessible from virtually any point in [place], via [address].              Future tests that were ordered for you today     Open Future Orders        Priority Expected Expires Ordered    US Upper Extremity Venous Duplex Left Routine 6/5/2018 5/21/2019 5/21/2018            Who to contact     If you have questions or need follow up information about today's clinic visit or your schedule please contact [facility] SOLID ORGAN TRANSPLANT directly at [phone].  Normal or non-critical lab and imaging results will be  "communicated to you by [employer], letter or phone within 4 business days after the clinic has received the results. If you do not hear from us within 7 days, please contact the clinic through [employer] or phone. If you have a critical or abnormal lab result, we will notify you by phone as soon as possible.  Submit refill requests through [employer] or call your pharmacy and they will forward the refill request to us. Please allow 3 business days for your refill to be completed.          Additional Information About Your Visit        [employer] Information     [employer] gives you secure access to your electronic health record. If you see a primary care provider, you can also send messages to your care team and make appointments. If you have questions, please call your primary care clinic.  If you do not have a primary care provider, please call [phone] and they will assist you.        Care EveryWhere ID     This is your Care EveryWhere ID. This could be used by other organizations to access your [place] medical records  [id number]        Your Vitals Were     Pulse Temperature Respirations Height Last Period Pulse Oximetry    97 98.2  F (36.8  C) (Oral) 16 1.613 m (5' 3.5\") 06/07/2014 (Exact Date) 97%    BMI (Body Mass Index)                   20.8 kg/m2            Blood Pressure from Last 3 Encounters:   05/21/18 (!) 147/94   05/10/18 137/84   05/09/18 135/89    Weight from Last 3 Encounters:   05/21/18 54.1 kg (119 lb 4.8 oz)   05/18/18 55.8 kg (123 lb)   05/11/18 54.9 kg (121 lb)                 Today's Medication Changes          These changes are accurate as of 5/21/18  9:14 AM.  If you have any questions, ask your nurse or doctor.               These medicines have changed or have updated prescriptions.        Dose/Directions    predniSONE 5 MG tablet   Commonly known as:  DELTASONE   This may have changed:    - how much to take  - how to take this  - when to take this  - additional instructions   Used for:  S/P " lung transplant (H)        Take 10 mg twice daily until 5/24, follow taper   Quantity:  300 tablet   Refills:  3            Where to get your medicines      These medications were sent to [facility] - [place], [place] - [address]  [address], [facility] [address]     Phone:  [phone]     enoxaparin 60 MG/0.6ML injection                Primary Care Provider Office Phone # Fax #    Rosymanjit Vu [phone]       [facility]  [address]  [facility] [address]        Equal Access to Services     [facility]: Hadii aad ku hadasho Soto, waaxda luqadaha, qaybta kaalmada adeegyada, waxsil kentin haymckayla daily . [facility] [phone].    ATENCIÓN: Si habla español, tiene a taylor disposición servicios gratuitos de asistencia lingüística. [facility] [phone].    We comply with applicable federal civil rights laws and [place] laws. We do not discriminate on the basis of race, color, national origin, age, disability, sex, sexual orientation, or gender identity.            Thank you!     Thank you for choosing [facility] SOLID ORGAN TRANSPLANT  for your care. Our goal is always to provide you with excellent care. Hearing back from our patients is one way we can continue to improve our services. Please take a few minutes to complete the written survey that you may receive in the mail after your visit with us. Thank you!             Your Updated Medication List - Protect others around you: Learn how to safely use, store and throw away your medicines at www.disposemymeds.org.          This list is accurate as of 5/21/18  9:14 AM.  Always use your most recent med list.                   Brand Name Dispense Instructions for use Diagnosis    albuterol 108 (90 Base) MCG/ACT Inhaler    PROAIR HFA/PROVENTIL HFA/VENTOLIN HFA    1 Inhaler    Inhale 2 puffs into the lungs every 6 hours as needed for shortness of breath / dyspnea or  wheezing    SOB (shortness of breath), Wheezing       calcium-vitamin D 600-400 MG-UNIT per tablet    CALTRATE    60 tablet    Take 1 tablet by mouth 2 times daily (with meals)    S/P lung transplant (H)       enoxaparin 60 MG/0.6ML injection    LOVENOX    25 Syringe    Inject 60 mg subcutaneously every 12 hours or as directed by Anticoagulation Clinic.    Acute deep vein thrombosis (DVT) of left upper extremity, unspecified vein (H), Lung transplant recipient (H)       levalbuterol 45 MCG/ACT Inhaler    XOPENEX HFA    1 Inhaler    Inhale 2 puffs into the lungs every 6 hours as needed for shortness of breath / dyspnea or wheezing    S/P lung transplant (H), Other constipation       magnesium oxide 400 MG tablet    MAG-OX    30 tablet    Take 1 tablet (400 mg) by mouth daily    Hypomagnesemia       metoprolol tartrate 25 MG tablet    LOPRESSOR    60 tablet    Take 1 tablet (25 mg) by mouth 2 times daily    Paroxysmal atrial fibrillation (H)       multivitamin, therapeutic with minerals Tabs tablet     30 each    Take 1 tablet by mouth daily    Lung transplant recipient (H)       mycophenolate 250 MG capsule    GENERIC EQUIVALENT    360 capsule    Take 6 capsules (1,500 mg) by mouth 2 times daily    S/P lung transplant (H)       nystatin 302352 UNIT/ML suspension    MYCOSTATIN    1200 mL    Take 10 mLs (1,000,000 Units) by mouth 4 times daily    Lung transplant recipient (H)       order for DME     1 Device    Equipment being ordered: Oxygen 5 liters at rest, 15 liters with exertion.  Needs portability.  Please provide 2 10-liter concentrators for in the home    ILD (interstitial lung disease) (H), Hypoxia       pantoprazole 40 MG EC tablet    PROTONIX    30 tablet    Take 1 tablet (40 mg) by mouth every morning    Gastroesophageal reflux disease without esophagitis       polyethylene glycol Packet    MIRALAX/GLYCOLAX    60 packet    Take 17 g by mouth 2 times daily as needed for constipation    Other constipation        predniSONE 5 MG tablet    DELTASONE    300 tablet    Take 10 mg twice daily until 5/24, follow taper    S/P lung transplant (H)       senna-docusate 8.6-50 MG per tablet    SENOKOT-S;PERICOLACE    100 tablet    Take 2 tablets by mouth 2 times daily as needed for constipation    Other constipation, S/P lung transplant (H)       sulfamethoxazole-trimethoprim 400-80 MG per tablet    BACTRIM/SEPTRA    30 tablet    1 tablet by Oral or NG Tube route daily    Organ transplant candidate, Lung disease, interstitial (H), Abnormal chest CT, Hypotension, unspecified hypotension type, Acute on chronic respiratory failure with hypoxia (H), ILD (interstitial lung disease) (H)       tacrolimus 1 MG capsule    GENERIC EQUIVALENT    330 capsule    Take 5 mg in the AM and 6 mg in the PM    S/P lung transplant (H), Other constipation       valGANciclovir 450 MG tablet    VALCYTE    60 tablet    Take 2 tablets (900 mg) by mouth daily    S/P lung transplant (H)       warfarin 1 MG tablet    COUMADIN    140 tablet    Take 4mg MWFSat and 6mg TuThSun, or as directed by the [facility] at [facility]    DVT (deep venous thrombosis) (H), Status post lung transplantation (H)

## 2018-05-21 NOTE — NURSING NOTE
Chief Complaint   Patient presents with     RECHECK     S/P Lung TX       Gianna Rogers CMA  5/21/2018 7:50 AM

## 2018-05-21 NOTE — LETTER
5/21/2018       RE: Kecia Blue  [address] [place] [address]     Dear Colleague,    Thank you for referring your patient, Kecia Blue, to the [facility]ANS[facility] at [facility]. Please see a copy of my visit note below    Pulmonary Medicine  Post-Lung Transplant Clinic Note   May 21, 2018       Patient: Kecia Blue  MRN: [id number]  Transplant Date: 2/21/2018 (POD# 81)           Assessment and Plan:     Mrs. Kecia Blue is a 56 y/o female with h/o dermatomyositis, seronegative RA, ILD and pulmonary hypertension who was admitted for emergent lung transplant workup on 2/21. [facility] and subsequently received a bilateral lung transplant on 3/1/18 (POD# 81). Post operative course complicated by right-sided pleural effusion s/p thoracentesis. She is seen today for routine follow up.     Problem List  1. S/p lung transplant - no pulmonary complaints. cxr clear however spirometry is not what I expect (she has plateaued). She may be slow to improve but there is clinical concern for rejection (acute cellular and/or antibody-mediated) Will hold the course for now - no high dose steroids. Bronch 4/10 with TBBX A0B0, Next bronch scheduled for 6/9/18 Will discuss case at multidisciplinary meeting.   - continue 3-drug immunosuppression and transplant prophylaxis with bactrim for PJP, Valcyte for CMV and nystatin for fungus   - Short term repeat spirometry   - will f/u DSA   - consider CT of chest to look for ggo/infiltrates  2. Medical anticoagulation for RIJ clot  3. Encourage daily physical activity  4. Encourage f/u with PCP for age- and gender-specific HCM      RTC in 2 months for repeat ROS, history and physical examination, labs including spirometry and xray, and medical clearance for bronch        Subjective & Interval History:     Kecia presents today for routine clinic visit. Her   is in attendance. Last seen ing Post-LTX clinic on 5/8/18 by Ame Chow. She is wondering if she is clear to return home. She states she is doing well. She is without any cardiopulmonary complaints. She states that her musculoskeletal chest pain is improving. She is participating in pulmonary rehab and has been told she will be graduated from the program. She states she is tolerating her mediations. She is requesting refills of lovenox  (RIJ clot in setting of vascular line) in anticipation for bridging for surveillance bronch. She does complain of some hoarseness and is wondering if she can be referred to ENT to address a vocal cord polyp seen at there last bronch    Family and Social histories were reviewed with pateint, no significant changes  Family History   Problem Relation Age of Onset     Hypertension Mother      Arthritis Mother      Pancreatic Cancer Father      DIABETES Father      Social History     Social History     Marital status:      Spouse name: N/A     Number of children: N/A     Years of education: N/A     Occupational History     Not on file.     Social History Main Topics     Smoking status: Never Smoker     Smokeless tobacco: Never Used     Alcohol use No     Drug use: No     Sexual activity: Not on file     Other Topics Concern     Parent/Sibling W/ Cabg, Mi Or Angioplasty Before 65f 55m? No     Social History Narrative                Review of Systems:     C: no fever, no chills, no change in weight, good appetite  INTEGUMENTARY/SKIN: no rash or obvious new lesions (+) Suhas  ENT/MOUTH: no sore throat, no sinus pain, no nasal drainage  RESP: see interval history  CV: no chest pain, no palpitations, no peripheral edema, no orthopnea, good BP control on review of BP log  GI: no nausea, no vomiting, no change in stools, no reflux symptoms  : no dysuria  MUSCULOSKELETAL: no myalgias, no arthralgias  ENDOCRINE: blood sugars with good control on review of labs, no heat or cold  intolerance  NEURO: no headache, no numbness or tingling  PSYCHIATRIC: mood stable          Medications:     Reviewed with patient:  Current Outpatient Prescriptions   Medication     albuterol (PROAIR HFA/PROVENTIL HFA/VENTOLIN HFA) 108 (90 Base) MCG/ACT Inhaler     calcium-vitamin D (CALTRATE) 600-400 MG-UNIT per tablet     enoxaparin (LOVENOX) 60 MG/0.6ML injection     levalbuterol (XOPENEX HFA) 45 MCG/ACT Inhaler     magnesium oxide (MAG-OX) 400 MG tablet     metoprolol tartrate (LOPRESSOR) 25 MG tablet     multivitamin, therapeutic with minerals (THERA-VIT-M) TABS tablet     mycophenolate (GENERIC EQUIVALENT) 250 MG capsule     nystatin (MYCOSTATIN) 288098 UNIT/ML suspension     pantoprazole (PROTONIX) 40 MG EC tablet     polyethylene glycol (MIRALAX/GLYCOLAX) Packet     predniSONE (DELTASONE) 5 MG tablet     senna-docusate (SENOKOT-S;PERICOLACE) 8.6-50 MG per tablet     sulfamethoxazole-trimethoprim (BACTRIM/SEPTRA) 400-80 MG per tablet     tacrolimus (GENERIC EQUIVALENT) 1 MG capsule     valGANciclovir (VALCYTE) 450 MG tablet     warfarin (COUMADIN) 1 MG tablet     order for DME     No current facility-administered medications for this visit.      She takes no OTC meds or supplements           Physical Exam:     PHYSICAL EXAMINATION:   GEN: Awake and alert, in no acute distress, sitting upright in chair. Pleasant and cooperative  HEENT: Pupils equal and reactive to light, conjunctiva are pink conjunctivae, sclera anicteric,  Oral mucosa moist without lesions.  NECK: supple no JVD/LAD  PULM: Good air flow bilaterally, symmetric in expansion,   CTAB No rhonchi, no wheezes. Breathing non-labored.  No palpable chest/back pain  CV: Normal S1 and S2. RRR.  No murmur, gallop, or rub.  No peripheral edema.  Peripheral pulses intact.   ABD: Soft, nontender, nondistended. Bowel sound normoactive, no guarding, no rebound.   MSK: .  No apparent muscle wasting. No clubbing, cyanosis, or edema (+) Reynauds  NEURO: Alert and  "oriented to person, place, and time.motor 5/5 upper/lower extremity b/l, sensation grossly intact to touch, gait normal  SKIN: Warm and dry. No visible rashes or lesions   PSYCH: Mood stable, judgment and insight appropriate.           Data:     All vital signs, laboratory and imaging data for the past 24 hours reviewed.      Vital signs:  Temp: 98.2  F (36.8  C) Temp src: Oral BP: (!) 147/94 (provider notified, has not taken BP meds yet today) Pulse: 97   Resp: 16 SpO2: 97 % (RA)     Height: 161.3 cm (5' 3.5\") Weight: 54.1 kg (119 lb 4.8 oz)    Weight trend:   Vitals:    05/21/18 0745   Weight: 54.1 kg (119 lb 4.8 oz)        Labs    Lab Results   Component Value Date    WBC 10.0 05/21/2018     Lab Results   Component Value Date    RBC 3.12 05/21/2018     Lab Results   Component Value Date    HGB 9.9 05/21/2018     Lab Results   Component Value Date    HCT 31.6 05/21/2018     No components found for: MCT  Lab Results   Component Value Date     05/21/2018     Lab Results   Component Value Date    MCH 31.7 05/21/2018     Lab Results   Component Value Date    MCHC 31.3 05/21/2018     Lab Results   Component Value Date    RDW 14.7 05/21/2018     Lab Results   Component Value Date     05/21/2018     basic metabolic panel  Last Basic Metabolic Panel:  Lab Results   Component Value Date     05/21/2018      Lab Results   Component Value Date    POTASSIUM 4.9 05/21/2018     Lab Results   Component Value Date    CHLORIDE 103 05/21/2018     Lab Results   Component Value Date    CHELSEY 9.3 05/21/2018     Lab Results   Component Value Date    CO2 25 05/21/2018     Lab Results   Component Value Date    BUN 38 05/21/2018     Lab Results   Component Value Date    CR 1.19 05/21/2018     Lab Results   Component Value Date     05/21/2018     DSA 5/16 pending     PFT interpretation: 5/21/2018 Maneuver: valid and meets ATS guidelines  1. FEV1 is 1.09 (42% pred)  2. FVC is  1.4 (43 pred)  3. Normal ratio with " decreased FEV1 and FVC  The reduced FEV1 is consistent with severe obstruction. The decrease in FVC may represent restrictive physiology.  Lung volumes would be necessary to determine.   Compared to prior: Her spirometry is slightly reduced today with the change is not significant. Of note, the patient has a near plateau of her spirometry (see full discussion above    CXR 2V 5/21/2018: film personally reviewed by me: no acute cardiopulmonary disease  Small bilateral pleural effusion, unchanged.    Patient was seen and examined by me. I personally reviewed the electronic medical record including labs, flowsheets, imaging reports and films, vitals, and medications.    Clinical update provided the patient and her caregiver. I answered all of their questions and provided them support.     Alex Hale MD, [facility]   of Medicine  Pulmonary, Critical Care and Sleep Medicine  [facility]  Pager: [phone].

## 2018-05-21 NOTE — LETTER
PHYSICIAN ORDERS      DATE & TIME ISSUED: May 21, 2018 2:51 PM  PATIENT NAME: Kecia Blue   : 1960     [facility] MR# [if applicable]: [id number]     DIAGNOSIS:  Lung Transplant  Z94.2     Item Frequency    CBC with platelets Weekly     Basic metabolic panel (including:  BUN,  Creatinine, Sodium, Potassium, Chloride, CO2, Calcium,Magnesium, Phosphorus, and Glucose) Weekly     Cyclosporine, Rapamune and/or Tacrolimus/Prograf level  (Should be mailed to the [facility] in the  provided to you by the patient.) 12 hour trough level Every 2 weeks  Or 1 week after dose change    CMV DNA Quant weekly     Thank you  for your continued support and the opportunity to collaborate in the care of this patient.  If you have any questions, please call the Thoracic Transplant Team at [phone] or [phone].      Please fax these results to [phone].      .

## 2018-05-21 NOTE — PROGRESS NOTES
ANTICOAGULATION FOLLOW-UP CLINIC VISIT    Patient Name:  Kecia Blue  Date:  5/21/2018  Contact Type:  Telephone    SUBJECTIVE:     Patient Findings     Positives No Problem Findings    Comments Pt reports she will be going home soon (currently at the [facility]) & will be getting labs drawn at the [facility].  Orders are faxed there today at: [phone].  This clinic had a recent name change to:  [facility].  Their website does not yet reflect this           OBJECTIVE    INR   Date Value Ref Range Status   05/21/2018 2.78 (H) 0.86 - 1.14 Final       ASSESSMENT / PLAN  INR assessment THER    Recheck INR In: 4 DAYS    INR Location Clinic      Anticoagulation Summary as of 5/21/2018     INR goal 2.0-3.0   Today's INR 2.78   Maintenance plan No maintenance plan   Full instructions 5/21: 4 mg; 5/22: 4 mg; 5/23: 4 mg; 5/24: 6 mg; Otherwise No maintenance plan   Next INR check 5/25/2018   Priority INR   Target end date     Indications   Lung transplant recipient (H) [Z94.2]  Deep vein thrombosis (DVT) (H) [I82.409] [I82.409]         Anticoagulation Episode Summary     INR check location Clinic Lab    Preferred lab     Send INR reminders to [facility]    Comments Plan for 3 months of therapy- Provoked DVT. Welcome package sent . Patient staying at [facility] HIPPA OK and  Verbal OK to speak with spouse and leave msg @ [phone].  labs @ [facility] starting ~ 5/25/18.  fax: [phone]. ph:[phone]      Anticoagulation Care Providers     Provider Role Specialty Phone number    Ame Chow PA-C Responsible Pulmonary [phone]            See the Encounter Report to view Anticoagulation Flowsheet and Dosing Calendar (Go to Encounters tab in chart review, and find the Anticoagulation Therapy Visit)    Spoke with patient. Gave them their lab results and new warfarin recommendation.  No changes in health, medication, or diet. No missed doses, no  falls. No signs or symptoms of bleed or clotting.      Judith Salazar RN

## 2018-05-21 NOTE — LETTER
5/21/2018      RE: Kecia Blue  [id number] [place]  [facility] [id number]             Pulmonary Medicine  Post-Lung Transplant Clinic Note   May 21, 2018       Patient: Kecia lBue  MRN: [id number]  Transplant Date: 2/21/2018 (POD# 81)           Assessment and Plan:     Mrs. eKcia Blue is a 58 y/o female with h/o dermatomyositis, seronegative RA, ILD and pulmonary hypertension who was admitted for emergent lung transplant workup on 2/21. [facility] and subsequently received a bilateral lung transplant on 3/1/18 (POD# 81). Post operative course complicated by right-sided pleural effusion s/p thoracentesis. She is seen today for routine follow up.     Problem List  1. S/p lung transplant - no pulmonary complaints. cxr clear however spirometry is not what I expect (she has plateaued). She may be slow to improve but there is clinical concern for rejection (acute cellular and/or antibody-mediated) Will hold the course for now - no high dose steroids. Bronch 4/10 with TBBX A0B0, Next bronch scheduled for 6/9/18 Will discuss case at multidisciplinary meeting.   - continue 3-drug immunosuppression and transplant prophylaxis with bactrim for PJP, Valcyte for CMV and nystatin for fungus   - Short term repeat spirometry   - will f/u DSA   - consider CT of chest to look for ggo/infiltrates  2. Medical anticoagulation for RIJ clot  3. Encourage daily physical activity  4. Encourage f/u with PCP for age- and gender-specific HCM      RTC in 2 months for repeat ROS, history and physical examination, labs including spirometry and xray, and medical clearance for bronch        Subjective & Interval History:     Kecia presents today for routine clinic visit. Her  is in attendance. Last seen ing Post-LTX clinic on 5/8/18 by Ame Chow. She is wondering if she is clear to return home. She states she is doing well. She is without any cardiopulmonary complaints. She  states that her musculoskeletal chest pain is improving. She is participating in pulmonary rehab and has been told she will be graduated from the program. She states she is tolerating her mediations. She is requesting refills of lovenox  (RIJ clot in setting of vascular line) in anticipation for bridging for surveillance bronch. She does complain of some hoarseness and is wondering if she can be referred to ENT to address a vocal cord polyp seen at there last bronch    Family and Social histories were reviewed with pateint, no significant changes  Family History   Problem Relation Age of Onset     Hypertension Mother      Arthritis Mother      Pancreatic Cancer Father      DIABETES Father      Social History     Social History     Marital status:      Spouse name: N/A     Number of children: N/A     Years of education: N/A     Occupational History     Not on file.     Social History Main Topics     Smoking status: Never Smoker     Smokeless tobacco: Never Used     Alcohol use No     Drug use: No     Sexual activity: Not on file     Other Topics Concern     Parent/Sibling W/ Cabg, Mi Or Angioplasty Before 65f 55m? No     Social History Narrative                Review of Systems:     C: no fever, no chills, no change in weight, good appetite  INTEGUMENTARY/SKIN: no rash or obvious new lesions (+) Reynauds  ENT/MOUTH: no sore throat, no sinus pain, no nasal drainage  RESP: see interval history  CV: no chest pain, no palpitations, no peripheral edema, no orthopnea, good BP control on review of BP log  GI: no nausea, no vomiting, no change in stools, no reflux symptoms  : no dysuria  MUSCULOSKELETAL: no myalgias, no arthralgias  ENDOCRINE: blood sugars with good control on review of labs, no heat or cold intolerance  NEURO: no headache, no numbness or tingling  PSYCHIATRIC: mood stable          Medications:     Reviewed with patient:  Current Outpatient Prescriptions   Medication     albuterol (PROAIR  HFA/PROVENTIL HFA/VENTOLIN HFA) 108 (90 Base) MCG/ACT Inhaler     calcium-vitamin D (CALTRATE) 600-400 MG-UNIT per tablet     enoxaparin (LOVENOX) 60 MG/0.6ML injection     levalbuterol (XOPENEX HFA) 45 MCG/ACT Inhaler     magnesium oxide (MAG-OX) 400 MG tablet     metoprolol tartrate (LOPRESSOR) 25 MG tablet     multivitamin, therapeutic with minerals (THERA-VIT-M) TABS tablet     mycophenolate (GENERIC EQUIVALENT) 250 MG capsule     nystatin (MYCOSTATIN) 969082 UNIT/ML suspension     pantoprazole (PROTONIX) 40 MG EC tablet     polyethylene glycol (MIRALAX/GLYCOLAX) Packet     predniSONE (DELTASONE) 5 MG tablet     senna-docusate (SENOKOT-S;PERICOLACE) 8.6-50 MG per tablet     sulfamethoxazole-trimethoprim (BACTRIM/SEPTRA) 400-80 MG per tablet     tacrolimus (GENERIC EQUIVALENT) 1 MG capsule     valGANciclovir (VALCYTE) 450 MG tablet     warfarin (COUMADIN) 1 MG tablet     order for DME     No current facility-administered medications for this visit.      She takes no OTC meds or supplements           Physical Exam:     PHYSICAL EXAMINATION:   GEN: Awake and alert, in no acute distress, sitting upright in chair. Pleasant and cooperative  HEENT: Pupils equal and reactive to light, conjunctiva are pink conjunctivae, sclera anicteric,  Oral mucosa moist without lesions.  NECK: supple no JVD/LAD  PULM: Good air flow bilaterally, symmetric in expansion,   CTAB No rhonchi, no wheezes. Breathing non-labored.  No palpable chest/back pain  CV: Normal S1 and S2. RRR.  No murmur, gallop, or rub.  No peripheral edema.  Peripheral pulses intact.   ABD: Soft, nontender, nondistended. Bowel sound normoactive, no guarding, no rebound.   MSK: .  No apparent muscle wasting. No clubbing, cyanosis, or edema (+) Reynauds  NEURO: Alert and oriented to person, place, and time.motor 5/5 upper/lower extremity b/l, sensation grossly intact to touch, gait normal  SKIN: Warm and dry. No visible rashes or lesions   PSYCH: Mood stable, judgment  "and insight appropriate.           Data:     All vital signs, laboratory and imaging data for the past 24 hours reviewed.      Vital signs:  Temp: 98.2  F (36.8  C) Temp src: Oral BP: (!) 147/94 (provider notified, has not taken BP meds yet today) Pulse: 97   Resp: 16 SpO2: 97 % (RA)     Height: 161.3 cm (5' 3.5\") Weight: 54.1 kg (119 lb 4.8 oz)    Weight trend:   Vitals:    05/21/18 0745   Weight: 54.1 kg (119 lb 4.8 oz)        Labs    Lab Results   Component Value Date    WBC 10.0 05/21/2018     Lab Results   Component Value Date    RBC 3.12 05/21/2018     Lab Results   Component Value Date    HGB 9.9 05/21/2018     Lab Results   Component Value Date    HCT 31.6 05/21/2018     No components found for: MCT  Lab Results   Component Value Date     05/21/2018     Lab Results   Component Value Date    MCH 31.7 05/21/2018     Lab Results   Component Value Date    MCHC 31.3 05/21/2018     Lab Results   Component Value Date    RDW 14.7 05/21/2018     Lab Results   Component Value Date     05/21/2018     basic metabolic panel  Last Basic Metabolic Panel:  Lab Results   Component Value Date     05/21/2018      Lab Results   Component Value Date    POTASSIUM 4.9 05/21/2018     Lab Results   Component Value Date    CHLORIDE 103 05/21/2018     Lab Results   Component Value Date    CHELSEY 9.3 05/21/2018     Lab Results   Component Value Date    CO2 25 05/21/2018     Lab Results   Component Value Date    BUN 38 05/21/2018     Lab Results   Component Value Date    CR 1.19 05/21/2018     Lab Results   Component Value Date     05/21/2018     DSA 5/16 pending     PFT interpretation: 5/21/2018 Maneuver: valid and meets ATS guidelines  1. FEV1 is 1.09 (42% pred)  2. FVC is  1.4 (43 pred)  3. Normal ratio with decreased FEV1 and FVC  The reduced FEV1 is consistent with severe obstruction. The decrease in FVC may represent restrictive physiology.  Lung volumes would be necessary to determine.   Compared to prior: " Her spirometry is slightly reduced today with the change is not significant. Of note, the patient has a near plateau of her spirometry (see full discussion above    CXR 2V 5/21/2018: film personally reviewed by me: no acute cardiopulmonary disease  Small bilateral pleural effusion, unchanged.    Patient was seen and examined by me. I personally reviewed the electronic medical record including labs, flowsheets, imaging reports and films, vitals, and medications.    Clinical update provided the patient and her caregiver. I answered all of their questions and provided them support.     Alex Hale MD, [facility]   of Medicine  Pulmonary, Critical Care and Sleep Medicine  [facility]  Pager: [phone].

## 2018-05-21 NOTE — PATIENT INSTRUCTIONS
Patient Instructions  1. Evelyn and you will arrange discharge education clinic appt.  2. Continue coumadin and lovenox as ordered until we check US in June.  3. We usually have our patients take the nystatin SS for 6 months after your transplant.  4. Continue to hydrate with 72 oz daily.  5. Can make an ENT appt locally for vocal cord polyp.    Next transplant clinic appointment:  6/5/18 with CXR, labs and PFTs, and US left arm.  Next lab draw: 1-2 weeks or as per Evelyn        ~~~~~~~~~~~~~~~~~~~~~~~~~    Thoracic Transplant Office phone [phone], fax [phone]    Office Hours 8:30 - 5:00     For after-hours urgent issues, please dial [phone], and ask to speak with the Thoracic Transplant Coordinator  On-Call, pager [phone].  --------------------  To expedite your medication refill(s), please contact your pharmacy and have them fax a refill request to: [phone]  .   *Please allow 3 business days for routine medication refills.  *Please allow 5 business days for controlled substance medication refills.    **For Diabetic medications and supplies refill(s), please contact your pharmacy and have them  Contact your Endocrine team.  --------------------  For scheduling appointments call [facility] transplant :  [phone]. For lab appointments call [phone] or [facility].  --------------------  Please Note: If you are active on [employer], all future test results will be sent by [employer] message only, and will no longer be called to patient. You may also receive communication directly from your physician.

## 2018-05-21 NOTE — MR AVS SNAPSHOT
Kecia Blue   5/21/2018   Anticoagulation Therapy Visit    Description:  57 year old female   Provider:  Judith Salazar, RN   Department:  [facility]           INR as of 5/21/2018     Today's INR 2.78      Anticoagulation Summary as of 5/21/2018     INR goal 2.0-3.0   Today's INR 2.78   Full instructions 5/21: 4 mg; 5/22: 4 mg; 5/23: 4 mg; 5/24: 6 mg; Otherwise No maintenance plan   Next INR check 5/25/2018    Indications   Lung transplant recipient (H) [Z94.2]  Deep vein thrombosis (DVT) (H) [I82.409] [I82.409]         May 2018 Details    Sun Mon Tue Wed Thu Fri Sat       1               2               3               4               5                 6               7               8               9               10               11               12                 13               14               15               16               17               18               19                 20               21      4 mg   See details      22      4 mg         23      4 mg         24      6 mg         25            26                 27               28               29               30               31                  Date Details   05/21 This INR check       Date of next INR:  5/25/2018         How to take your warfarin dose     To take:  4 mg Take 4 of the 1 mg tablets.    To take:  6 mg Take 6 of the 1 mg tablets.

## 2018-05-21 NOTE — PROGRESS NOTES
Evelyn Wright, RN  P [facility], can't remember if I told you guys this but she's scheduled for a bronch on 6/6. She'll need lovenox. thanks       6/1 start holding warfarin  6/2 start Lovenox 60mg PM dose  6/3 Lovenox 60mg AM and PM  6/4 Lovenox 60mg AM and PM  6/5 No Lovenox  6/6 Day of procedure and patient should ask MD doing procedure when she is able to restart warfarin and Lovenox.    Patient will need to be given these instructions on 5/25 and if she needs a script of Lovenox called to pharm.  WKH

## 2018-05-21 NOTE — PROGRESS NOTES
Pulmonary Medicine  Post-Lung Transplant Clinic Note   May 21, 2018       Patient: Kecia Blue  MRN: [id number]  Transplant Date: 2/21/2018 (POD# 81)           Assessment and Plan:     Mrs. Kecia Blue is a 58 y/o female with h/o dermatomyositis, seronegative RA, ILD and pulmonary hypertension who was admitted for emergent lung transplant workup on 2/21. [facility] and subsequently received a bilateral lung transplant on 3/1/18 (POD# 81). Post operative course complicated by right-sided pleural effusion s/p thoracentesis. She is seen today for routine follow up.     Problem List  1. S/p lung transplant - no pulmonary complaints. cxr clear however spirometry is not what I expect (she has plateaued). She may be slow to improve but there is clinical concern for rejection (acute cellular and/or antibody-mediated) Will hold the course for now - no high dose steroids. Bronch 4/10 with TBBX A0B0, Next bronch scheduled for 6/9/18 Will discuss case at multidisciplinary meeting.   - continue 3-drug immunosuppression and transplant prophylaxis with bactrim for PJP, Valcyte for CMV and nystatin for fungus   - Short term repeat spirometry   - will f/u DSA   - consider CT of chest to look for ggo/infiltrates  2. Medical anticoagulation for RIJ clot  3. Encourage daily physical activity  4. Encourage f/u with PCP for age- and gender-specific HCM      RTC in 2 months for repeat ROS, history and physical examination, labs including spirometry and xray, and medical clearance for bronch        Subjective & Interval History:     Kecia presents today for routine clinic visit. Her  is in attendance. Last seen ing Post-LTX clinic on 5/8/18 by Ame Chow. She is wondering if she is clear to return home. She states she is doing well. She is without any cardiopulmonary complaints. She states that her musculoskeletal chest pain is improving. She is participating in  pulmonary rehab and has been told she will be graduated from the program. She states she is tolerating her mediations. She is requesting refills of lovenox  (RIJ clot in setting of vascular line) in anticipation for bridging for surveillance bronch. She does complain of some hoarseness and is wondering if she can be referred to ENT to address a vocal cord polyp seen at there last bronch    Family and Social histories were reviewed with pateint, no significant changes  Family History   Problem Relation Age of Onset     Hypertension Mother      Arthritis Mother      Pancreatic Cancer Father      DIABETES Father      Social History     Social History     Marital status:      Spouse name: N/A     Number of children: N/A     Years of education: N/A     Occupational History     Not on file.     Social History Main Topics     Smoking status: Never Smoker     Smokeless tobacco: Never Used     Alcohol use No     Drug use: No     Sexual activity: Not on file     Other Topics Concern     Parent/Sibling W/ Cabg, Mi Or Angioplasty Before 65f 55m? No     Social History Narrative                Review of Systems:     C: no fever, no chills, no change in weight, good appetite  INTEGUMENTARY/SKIN: no rash or obvious new lesions (+) Reynauds  ENT/MOUTH: no sore throat, no sinus pain, no nasal drainage  RESP: see interval history  CV: no chest pain, no palpitations, no peripheral edema, no orthopnea, good BP control on review of BP log  GI: no nausea, no vomiting, no change in stools, no reflux symptoms  : no dysuria  MUSCULOSKELETAL: no myalgias, no arthralgias  ENDOCRINE: blood sugars with good control on review of labs, no heat or cold intolerance  NEURO: no headache, no numbness or tingling  PSYCHIATRIC: mood stable          Medications:     Reviewed with patient:  Current Outpatient Prescriptions   Medication     albuterol (PROAIR HFA/PROVENTIL HFA/VENTOLIN HFA) 108 (90 Base) MCG/ACT Inhaler     calcium-vitamin D  (CALTRATE) 600-400 MG-UNIT per tablet     enoxaparin (LOVENOX) 60 MG/0.6ML injection     levalbuterol (XOPENEX HFA) 45 MCG/ACT Inhaler     magnesium oxide (MAG-OX) 400 MG tablet     metoprolol tartrate (LOPRESSOR) 25 MG tablet     multivitamin, therapeutic with minerals (THERA-VIT-M) TABS tablet     mycophenolate (GENERIC EQUIVALENT) 250 MG capsule     nystatin (MYCOSTATIN) 052058 UNIT/ML suspension     pantoprazole (PROTONIX) 40 MG EC tablet     polyethylene glycol (MIRALAX/GLYCOLAX) Packet     predniSONE (DELTASONE) 5 MG tablet     senna-docusate (SENOKOT-S;PERICOLACE) 8.6-50 MG per tablet     sulfamethoxazole-trimethoprim (BACTRIM/SEPTRA) 400-80 MG per tablet     tacrolimus (GENERIC EQUIVALENT) 1 MG capsule     valGANciclovir (VALCYTE) 450 MG tablet     warfarin (COUMADIN) 1 MG tablet     order for DME     No current facility-administered medications for this visit.      She takes no OTC meds or supplements           Physical Exam:     PHYSICAL EXAMINATION:   GEN: Awake and alert, in no acute distress, sitting upright in chair. Pleasant and cooperative  HEENT: Pupils equal and reactive to light, conjunctiva are pink conjunctivae, sclera anicteric,  Oral mucosa moist without lesions.  NECK: supple no JVD/LAD  PULM: Good air flow bilaterally, symmetric in expansion,   CTAB No rhonchi, no wheezes. Breathing non-labored. No palpable chest/back pain  CV: Normal S1 and S2. RRR.  No murmur, gallop, or rub.  No peripheral edema.  Peripheral pulses intact.   ABD: Soft, nontender, nondistended. Bowel sound normoactive, no guarding, no rebound.   MSK: .  No apparent muscle wasting. No clubbing, cyanosis, or edema (+) Reynauds  NEURO: Alert and oriented to person, place, and time.motor 5/5 upper/lower extremity b/l, sensation grossly intact to touch, gait normal  SKIN: Warm and dry. No visible rashes or lesions   PSYCH: Mood stable, judgment and insight appropriate.           Data:     All vital signs, laboratory and imaging  "data for the past 24 hours reviewed.      Vital signs:  Temp: 98.2  F (36.8  C) Temp src: Oral BP: (!) 147/94 (provider notified, has not taken BP meds yet today) Pulse: 97   Resp: 16 SpO2: 97 % (RA)     Height: 161.3 cm (5' 3.5\") Weight: 54.1 kg (119 lb 4.8 oz)    Weight trend:   Vitals:    05/21/18 0745   Weight: 54.1 kg (119 lb 4.8 oz)        Labs    Lab Results   Component Value Date    WBC 10.0 05/21/2018     Lab Results   Component Value Date    RBC 3.12 05/21/2018     Lab Results   Component Value Date    HGB 9.9 05/21/2018     Lab Results   Component Value Date    HCT 31.6 05/21/2018     No components found for: MCT  Lab Results   Component Value Date     05/21/2018     Lab Results   Component Value Date    MCH 31.7 05/21/2018     Lab Results   Component Value Date    MCHC 31.3 05/21/2018     Lab Results   Component Value Date    RDW 14.7 05/21/2018     Lab Results   Component Value Date     05/21/2018     basic metabolic panel  Last Basic Metabolic Panel:  Lab Results   Component Value Date     05/21/2018      Lab Results   Component Value Date    POTASSIUM 4.9 05/21/2018     Lab Results   Component Value Date    CHLORIDE 103 05/21/2018     Lab Results   Component Value Date    CHELSEY 9.3 05/21/2018     Lab Results   Component Value Date    CO2 25 05/21/2018     Lab Results   Component Value Date    BUN 38 05/21/2018     Lab Results   Component Value Date    CR 1.19 05/21/2018     Lab Results   Component Value Date     05/21/2018     DSA 5/16 pending     PFT interpretation: 5/21/2018 Maneuver: valid and meets ATS guidelines  1. FEV1 is 1.09 (42% pred)  2. FVC is  1.4 (43 pred)  3. Normal ratio with decreased FEV1 and FVC  The reduced FEV1 is consistent with severe obstruction. The decrease in FVC may represent restrictive physiology.  Lung volumes would be necessary to determine.   Compared to prior: Her spirometry is slightly reduced today with the change is not significant. Of " note, the patient has a near plateau of her spirometry (see full discussion above    CXR 2V 5/21/2018: film personally reviewed by me: no acute cardiopulmonary disease  Small bilateral pleural effusion, unchanged.    Patient was seen and examined by me. I personally reviewed the electronic medical record including labs, flowsheets, imaging reports and films, vitals, and medications.    Clinical update provided the patient and her caregiver. I answered all of their questions and provided them support.     Alex Hale MD, [facility]   of Medicine  Pulmonary, Critical Care and Sleep Medicine  [facility]  Pager: [phone].

## 2018-05-21 NOTE — MR AVS SNAPSHOT
Kecia Blue   5/21/2018   Anticoagulation Therapy Visit    Description:  57 year old female   Provider:  Katie Bush, RN   Department:  [facility]           INR as of 5/21/2018     Today's INR       Anticoagulation Summary as of 5/21/2018     INR goal 2.0-3.0   Today's INR    Next INR check     Indications   Lung transplant recipient (H) [Z94.2]  Deep vein thrombosis (DVT) (H) [I82.409] [I82.409]         May 2018 Details    Sun Mon Tue Wed Thu Fri Sat       1               2               3               4               5                 6               7               8               9               10               11               12                 13               14               15               16               17               18               19                 20               21      4 mg   See details      22      4 mg         23      4 mg         24      6 mg         25            26                 27               28               29               30               31                  Date Details   05/21 This INR check       Date of next INR:  5/25/2018         How to take your warfarin dose     To take:  4 mg Take 4 of the 1 mg tablets.    To take:  6 mg Take 6 of the 1 mg tablets.

## 2018-05-21 NOTE — NURSING NOTE
Transplant Coordinator Note    Reason for visit: Post lung transplant follow up visit   Coordinator: Present   Caregiver:      Health concerns addressed today:  1. Arm and neck clot.  On coumadin.  2. Breathing stable.  3. See ENT about vocal cord polyp and can do this in [place].  4. US for clot 6/5.  5. BP high but just took her meds.    Activity:  Ad diego    Oxygen needs:     Pain management:     Diabetic management:     Pt Education:     Health Maintenance:     Labs, CXR, PFTs reviewed with patient  Medication record reviewed and reconciled  Questions and concerns addressed    Patient Instructions  1. Evelyn and you will arrange discharge education clinic appt.  2. Continue coumadin and lovenox as ordered until we check US in June.  3. We usually have our patients take the nystatin SS for 6 months after your transplant.  4. Continue to hydrate with 72 oz daily.  5. Can make an ENT appt locally for the vocal cord polyp.    Next transplant clinic appointment:  6/5/18 with CXR, labs and PFTs, and US left arm.  Next lab draw: 1-2 weeks or as per Evelyn ALVARADO printed at time of check out

## 2018-05-23 NOTE — PROGRESS NOTES
Tacrolimus level on 5/21 was 10.7@12hrs, goal 10-15. No dose changes at this time, discussed with patient.

## 2018-05-24 ENCOUNTER — MYC REFILL (OUTPATIENT)
Dept: TRANSPLANT | Facility: CLINIC | Age: 58
End: 2018-05-24

## 2018-05-24 DIAGNOSIS — I95.9 HYPOTENSION, UNSPECIFIED HYPOTENSION TYPE: ICD-10-CM

## 2018-05-24 DIAGNOSIS — J84.9 LUNG DISEASE, INTERSTITIAL (H): ICD-10-CM

## 2018-05-24 DIAGNOSIS — R93.89 ABNORMAL CHEST CT: ICD-10-CM

## 2018-05-24 DIAGNOSIS — Z76.82 ORGAN TRANSPLANT CANDIDATE: ICD-10-CM

## 2018-05-24 DIAGNOSIS — J96.21 ACUTE ON CHRONIC RESPIRATORY FAILURE WITH HYPOXIA (H): ICD-10-CM

## 2018-05-24 DIAGNOSIS — J84.9 ILD (INTERSTITIAL LUNG DISEASE) (H): ICD-10-CM

## 2018-05-24 LAB
DONOR IDENTIFICATION: NORMAL
DQB7: 1658
DSA COMMENTS: NORMAL
DSA PRESENT: YES
DSA TEST METHOD: NORMAL
ORGAN: NORMAL
SA1 CELL: NORMAL
SA1 COMMENTS: NORMAL
SA1 HI RISK ABY: NORMAL
SA1 MOD RISK ABY: NORMAL
SA1 TEST METHOD: NORMAL
SA2 CELL: NORMAL
SA2 COMMENTS: NORMAL
SA2 HI RISK ABY UA: NORMAL
SA2 MOD RISK ABY: NORMAL
SA2 TEST METHOD: NORMAL

## 2018-05-24 RX ORDER — PANTOPRAZOLE SODIUM 40 MG/1
40 TABLET, DELAYED RELEASE ORAL EVERY MORNING
Qty: 30 TABLET | Refills: 11 | Status: SHIPPED | OUTPATIENT
Start: 2018-05-24 | End: 2018-08-20

## 2018-05-24 RX ORDER — METOPROLOL TARTRATE 25 MG/1
25 TABLET, FILM COATED ORAL 2 TIMES DAILY
Qty: 60 TABLET | Refills: 11 | Status: SHIPPED | OUTPATIENT
Start: 2018-05-24 | End: 2018-09-20

## 2018-05-24 RX ORDER — POLYETHYLENE GLYCOL 3350 17 G/17G
17 POWDER, FOR SOLUTION ORAL 2 TIMES DAILY PRN
Qty: 60 PACKET | Refills: 11 | Status: SHIPPED | OUTPATIENT
Start: 2018-05-24 | End: 2018-06-05

## 2018-05-24 RX ORDER — MAGNESIUM OXIDE 400 MG/1
400 TABLET ORAL DAILY
Qty: 30 TABLET | Refills: 11 | Status: SHIPPED | OUTPATIENT
Start: 2018-05-24 | End: 2018-07-23

## 2018-05-24 RX ORDER — MULTIPLE VITAMINS W/ MINERALS TAB 9MG-400MCG
1 TAB ORAL DAILY
Qty: 30 EACH | Refills: 11 | Status: ON HOLD | OUTPATIENT
Start: 2018-05-24 | End: 2021-02-25

## 2018-05-24 RX ORDER — AMOXICILLIN 250 MG
2 CAPSULE ORAL 2 TIMES DAILY PRN
Qty: 100 TABLET | Refills: 3 | Status: SHIPPED | OUTPATIENT
Start: 2018-05-24 | End: 2018-06-05

## 2018-05-24 RX ORDER — NYSTATIN 100000/ML
10 SUSPENSION, ORAL (FINAL DOSE FORM) ORAL 4 TIMES DAILY
Qty: 1200 ML | Refills: 5 | Status: SHIPPED | OUTPATIENT
Start: 2018-05-24 | End: 2018-09-04

## 2018-05-24 RX ORDER — SULFAMETHOXAZOLE AND TRIMETHOPRIM 400; 80 MG/1; MG/1
1 TABLET ORAL DAILY
Qty: 30 TABLET | Refills: 11 | Status: SHIPPED | OUTPATIENT
Start: 2018-05-24 | End: 2018-05-25

## 2018-05-24 RX ORDER — WARFARIN SODIUM 1 MG/1
TABLET ORAL
Qty: 140 TABLET | Refills: 3 | Status: SHIPPED | OUTPATIENT
Start: 2018-05-24 | End: 2018-07-17

## 2018-05-24 RX ORDER — PREDNISONE 5 MG/1
TABLET ORAL
Qty: 300 TABLET | Refills: 3 | Status: SHIPPED | OUTPATIENT
Start: 2018-05-24 | End: 2018-07-05

## 2018-05-24 RX ORDER — LEVALBUTEROL TARTRATE 45 UG/1
2 AEROSOL, METERED ORAL EVERY 6 HOURS PRN
Qty: 1 INHALER | Refills: 11 | Status: SHIPPED | OUTPATIENT
Start: 2018-05-24 | End: 2018-10-29 | Stop reason: ALTCHOICE

## 2018-05-24 RX ORDER — ALBUTEROL SULFATE 90 UG/1
2 AEROSOL, METERED RESPIRATORY (INHALATION) EVERY 6 HOURS PRN
Qty: 1 INHALER | Refills: 1 | Status: SHIPPED | OUTPATIENT
Start: 2018-05-24 | End: 2019-01-16

## 2018-05-25 ENCOUNTER — ANTICOAGULATION THERAPY VISIT (OUTPATIENT)
Dept: ANTICOAGULATION | Facility: CLINIC | Age: 58
End: 2018-05-25

## 2018-05-25 DIAGNOSIS — Z94.2 LUNG TRANSPLANT RECIPIENT (H): ICD-10-CM

## 2018-05-25 DIAGNOSIS — I82.622 ACUTE DEEP VEIN THROMBOSIS (DVT) OF LEFT UPPER EXTREMITY, UNSPECIFIED VEIN (H): ICD-10-CM

## 2018-05-25 LAB — INR PPP: 3.43

## 2018-05-25 RX ORDER — SULFAMETHOXAZOLE AND TRIMETHOPRIM 400; 80 MG/1; MG/1
1 TABLET ORAL DAILY
Qty: 30 TABLET | Refills: 11 | Status: SHIPPED | OUTPATIENT
Start: 2018-05-25 | End: 2018-07-26

## 2018-05-25 RX ORDER — SULFAMETHOXAZOLE AND TRIMETHOPRIM 400; 80 MG/1; MG/1
1 TABLET ORAL DAILY
Qty: 30 TABLET | Refills: 11 | Status: SHIPPED | OUTPATIENT
Start: 2018-05-25 | End: 2018-06-05

## 2018-05-25 NOTE — TELEPHONE ENCOUNTER
Pending Prescriptions:                       Disp   Refills    sulfamethoxazole-trimethoprim (BACTRIM/SE*30 tab*11           Si tablet by Oral or NG Tube route daily    sulfamethoxazole-trimethoprim (BACTRIM/SE*30 tab*11           Si tablet by Oral or NG Tube route daily    [facility]

## 2018-05-25 NOTE — TELEPHONE ENCOUNTER
Message from Superbly:  Original authorizing provider: MD Kecia Watson would like a refill of the following medications:  sulfamethoxazole-trimethoprim (BACTRIM/SEPTRA) 400-80 MG per tablet [Dean Riley MD]    Preferred pharmacy: [facility] [address]    Comment:

## 2018-05-25 NOTE — PROGRESS NOTES
ANTICOAGULATION FOLLOW-UP CLINIC VISIT    Patient Name:  Kecia Blue  Date:  5/25/2018  Contact Type:  Telephone    SUBJECTIVE:     Patient Findings     Positives No Problem Findings    Comments Pt continues on Bactrim. Please look at note 5/21/18 for holding plan for upcoming bronch            OBJECTIVE    INR   Date Value Ref Range Status   05/25/2018 3.43  Final     Comment:     Outside Lab        ASSESSMENT / PLAN  INR assessment SUPRA    Recheck INR In: 4 DAYS    INR Location Outside lab      Anticoagulation Summary as of 5/25/2018     INR goal 2.0-3.0   Today's INR 3.43!   Warfarin maintenance plan No maintenance plan   Full warfarin instructions 5/25: 2 mg; 5/26: 2 mg; 5/27: 4 mg; 5/28: 4 mg; Otherwise No maintenance plan   Next INR check 5/29/2018   Priority INR   Target end date     Indications   Lung transplant recipient (H) [Z94.2]  Deep vein thrombosis (DVT) (H) [I82.409] [I82.409]         Anticoagulation Episode Summary     INR check location Clinic Lab    Preferred lab     Send INR reminders to [facility]    Comments Plan for 3 months of therapy- Provoked DVTHIPPA OK and  Verbal OK to speak with spouse and leave msg @ [phone].  labs @ [facility] starting ~ 5/25/18.  fax: [phone]. ph:[phone]      Anticoagulation Care Providers     Provider Role Specialty Phone number    Ame Chow PA-C Responsible Pulmonary [phone]            See the Encounter Report to view Anticoagulation Flowsheet and Dosing Calendar (Go to Encounters tab in chart review, and find the Anticoagulation Therapy Visit)    Spoke with patient. Gave them their lab results and new warfarin recommendation.  No changes in health, medication, or diet. No missed doses, no falls. No signs or symptoms of bleed or clotting.     Ofe Redmond RN

## 2018-05-25 NOTE — MR AVS SNAPSHOT
Kecia Blue   5/25/2018   Anticoagulation Therapy Visit    Description:  57 year old female   Provider:  Ofe Redmond, RN   [facility]           INR as of 5/25/2018     Today's INR 3.43!      Anticoagulation Summary as of 5/25/2018     INR goal 2.0-3.0   Today's INR 3.43!   Full warfarin instructions 5/25: 2 mg; 5/26: 2 mg; 5/27: 4 mg; 5/28: 4 mg; Otherwise No maintenance plan   Next INR check 5/29/2018    Indications   Lung transplant recipient (H) [Z94.2]  Deep vein thrombosis (DVT) (H) [I82.409] [I82.409]         May 2018 Details    Sun Mon Tue Wed Thu Fri Sat       1               2               3               4               5                 6               7               8               9               10               11               12                 13               14               15               16               17               18               19                 20               21               22               23               24               25      2 mg   See details      26      2 mg           27      4 mg         28      4 mg         29            30               31                  Date Details   05/25 This INR check       Date of next INR:  5/29/2018         How to take your warfarin dose     To take:  2 mg Take 2 of the 1 mg tablets.    To take:  4 mg Take 4 of the 1 mg tablets.

## 2018-05-29 ENCOUNTER — ANTICOAGULATION THERAPY VISIT (OUTPATIENT)
Dept: ANTICOAGULATION | Facility: CLINIC | Age: 58
End: 2018-05-29

## 2018-05-29 DIAGNOSIS — I82.622 ACUTE DEEP VEIN THROMBOSIS (DVT) OF LEFT UPPER EXTREMITY, UNSPECIFIED VEIN (H): ICD-10-CM

## 2018-05-29 DIAGNOSIS — Z94.2 LUNG TRANSPLANT RECIPIENT (H): ICD-10-CM

## 2018-05-29 DIAGNOSIS — Z94.2 LUNG REPLACED BY TRANSPLANT (H): ICD-10-CM

## 2018-05-29 LAB
ANNOTATION COMMENT IMP: ABNORMAL
EJ AB SER QL: NEGATIVE
ENA JO1 AB TITR SER: 66 AU/ML (ref 0–40)
INR PPP: 2.26
MDA5 (CADM 140) ABY: NEGATIVE
MI2 AB SER QL: NEGATIVE
NXP-2 (NUCLEAR MATRIX PROTEIN 2) ABY: NEGATIVE
OJ AB SER QL: NEGATIVE
P155/140 (TIF1-GAMMA) ANTIBODY: NEGATIVE
PL12 AB SER QL: NEGATIVE
PL7 AB SER QL: NEGATIVE
SAE1 (SUMO ACTIVATING ENZYME) ABY: NEGATIVE
SRP AB SERPL QL: NEGATIVE
TIF-1 GAMMA ANTIBODY: NEGATIVE

## 2018-05-29 PROCEDURE — 80197 ASSAY OF TACROLIMUS: CPT | Performed by: INTERNAL MEDICINE

## 2018-05-29 NOTE — PROGRESS NOTES
ANTICOAGULATION FOLLOW-UP CLINIC VISIT    Patient Name:  Kecia Blue  Date:  5/29/2018  Contact Type:  Telephone    SUBJECTIVE:     Patient Findings     Positives No Problem Findings    Comments Pt is scheduled for a bronch on 6/6 and has not been notified about a plan. Looking back and previous plan outlined on 5/3/18. Sent this plan to transplant coordinator            OBJECTIVE    INR   Date Value Ref Range Status   05/29/2018 2.26  Final     Comment:     Outside Lab        ASSESSMENT / PLAN  INR assessment THER    Recheck INR In: 2 DAYS    INR Location Outside lab      Anticoagulation Summary as of 5/29/2018     INR goal 2.0-3.0   Today's INR 2.26   Warfarin maintenance plan No maintenance plan   Full warfarin instructions 5/29: 4 mg; 5/30: 4 mg; Otherwise No maintenance plan   Next INR check 5/31/2018   Priority INR   Target end date     Indications   Lung transplant recipient (H) [Z94.2]  Deep vein thrombosis (DVT) (H) [I82.409] [I82.409]         Anticoagulation Episode Summary     INR check location Clinic Lab    Preferred lab     Send INR reminders to [facility]    Comments Plan for 3 months of therapy- Provoked DVTHIPPA OK and  Verbal OK to speak with spouse and leave msg @ [phone].  labs @ [facility] starting ~ 5/25/18.  fax: [phone]. ph:[phone]      Anticoagulation Care Providers     Provider Role Specialty Phone number    Ame Chow PA-C Responsible Pulmonary [phone]            See the Encounter Report to view Anticoagulation Flowsheet and Dosing Calendar (Go to Encounters tab in chart review, and find the Anticoagulation Therapy Visit)    Spoke with patient. Gave them their lab results and new warfarin recommendation.  No changes in health, medication, or diet. No missed doses, no falls. No signs or symptoms of bleed or clotting.       5/31/18 Last day of Warfarin Patient will get an INR this Date  6/1/18 Hold Warfarin Start Lovenox 60mg in  PM  6/2/18 Hold Warfarin Lovenox 60mg AM and PM  6/3/18 Hold Warfarin Lovenox 60 AM and PM   6/4/18 Hold Warfarin Lovenox 60 AM and PM  6/5/17 Hold Warfarin and Lovenox  6/6/18 Day of procedure Kecia will check with provider about restarting Warfarin and Lovenox      Ofe Redmond RN        Addendum 5/30/18 Received below message from Evelyn Wright [place] Coordinator     Looks good to me if it worked well last time, thanks     Went over plan with pt and she reports she just got a box of Lovenox 60mg syringes and is good. Communicated understanding with plan and will f/u with pt after procedure. Ofe Redmond RN       Addendum 6/5/18 INR 1.02 no calls made since pt is holding Warfarin for upcoming bronch on 6/6. Ofe Redmond RN

## 2018-05-29 NOTE — MR AVS SNAPSHOT
Kecia Blue   5/29/2018   Anticoagulation Therapy Visit    Description:  57 year old female   Provider:  Ofe Redmond, RN   Department:  [facility]           INR as of 5/29/2018     Today's INR 2.26      Anticoagulation Summary as of 5/29/2018     INR goal 2.0-3.0   Today's INR 2.26   Full warfarin instructions 5/29: 4 mg; 5/30: 4 mg; Otherwise No maintenance plan   Next INR check 5/31/2018    Indications   Lung transplant recipient (H) [Z94.2]  Deep vein thrombosis (DVT) (H) [I82.409] [I82.409]         May 2018 Details    Sun Mon Tue Wed Thu Fri Sat       1               2               3               4               5                 6               7               8               9               10               11               12                 13               14               15               16               17               18               19                 20               21               22               23               24               25               26                 27               28               29      4 mg   See details      30      4 mg         31               Date Details   05/29 This INR check       Date of next INR:  5/31/2018         How to take your warfarin dose     To take:  4 mg Take 4 of the 1 mg tablets.

## 2018-05-30 LAB
TACROLIMUS BLD-MCNC: 13.4 UG/L (ref 5–15)
TME LAST DOSE: NORMAL H

## 2018-05-31 ENCOUNTER — TELEPHONE (OUTPATIENT)
Dept: TRANSPLANT | Facility: CLINIC | Age: 58
End: 2018-05-31

## 2018-06-01 NOTE — PROGRESS NOTES
Tacrolimus level 13.4 at 12 hours, on 5/28/18  Goal 10-15.   Current dose 5 mg in AM, 6 mg in PM  No change in dose at this time.    Discussed with patient.   Other labs drawn this day were reviewed and stable.

## 2018-06-05 ENCOUNTER — RADIANT APPOINTMENT (OUTPATIENT)
Dept: ULTRASOUND IMAGING | Facility: CLINIC | Age: 58
End: 2018-06-05
Attending: INTERNAL MEDICINE
Payer: COMMERCIAL

## 2018-06-05 ENCOUNTER — OFFICE VISIT (OUTPATIENT)
Dept: TRANSPLANT | Facility: CLINIC | Age: 58
End: 2018-06-05
Attending: FAMILY MEDICINE
Payer: COMMERCIAL

## 2018-06-05 ENCOUNTER — OFFICE VISIT (OUTPATIENT)
Dept: PHARMACY | Facility: CLINIC | Age: 58
End: 2018-06-05
Payer: COMMERCIAL

## 2018-06-05 ENCOUNTER — RADIANT APPOINTMENT (OUTPATIENT)
Dept: GENERAL RADIOLOGY | Facility: CLINIC | Age: 58
End: 2018-06-05
Attending: PHYSICIAN ASSISTANT
Payer: COMMERCIAL

## 2018-06-05 ENCOUNTER — RESULTS ONLY (OUTPATIENT)
Dept: OTHER | Facility: CLINIC | Age: 58
End: 2018-06-05

## 2018-06-05 VITALS
BODY MASS INDEX: 20.32 KG/M2 | HEART RATE: 100 BPM | TEMPERATURE: 97.8 F | WEIGHT: 119 LBS | HEIGHT: 64 IN | SYSTOLIC BLOOD PRESSURE: 148 MMHG | DIASTOLIC BLOOD PRESSURE: 90 MMHG | OXYGEN SATURATION: 97 %

## 2018-06-05 DIAGNOSIS — R07.89 ATYPICAL CHEST PAIN: Primary | ICD-10-CM

## 2018-06-05 DIAGNOSIS — Z94.2 LUNG TRANSPLANT RECIPIENT (H): Primary | ICD-10-CM

## 2018-06-05 DIAGNOSIS — I82.622 ACUTE DEEP VEIN THROMBOSIS (DVT) OF LEFT UPPER EXTREMITY, UNSPECIFIED VEIN (H): ICD-10-CM

## 2018-06-05 DIAGNOSIS — Z94.2 LUNG TRANSPLANT RECIPIENT (H): ICD-10-CM

## 2018-06-05 DIAGNOSIS — Z94.2 S/P LUNG TRANSPLANT (H): ICD-10-CM

## 2018-06-05 DIAGNOSIS — I10 ESSENTIAL HYPERTENSION: ICD-10-CM

## 2018-06-05 DIAGNOSIS — R07.9 CHEST PAIN, UNSPECIFIED TYPE: ICD-10-CM

## 2018-06-05 DIAGNOSIS — R06.02 SOB (SHORTNESS OF BREATH): ICD-10-CM

## 2018-06-05 DIAGNOSIS — Z20.828 CONTACT WITH OR EXPOSURE TO VIRAL DISEASE: ICD-10-CM

## 2018-06-05 DIAGNOSIS — I82.629 DEEP VEIN THROMBOSIS (DVT) OF UPPER EXTREMITY, UNSPECIFIED CHRONICITY, UNSPECIFIED LATERALITY, UNSPECIFIED VEIN (H): ICD-10-CM

## 2018-06-05 LAB
ANION GAP SERPL CALCULATED.3IONS-SCNC: 9 MMOL/L (ref 3–14)
BUN SERPL-MCNC: 31 MG/DL (ref 7–30)
CALCIUM SERPL-MCNC: 9.5 MG/DL (ref 8.5–10.1)
CHLORIDE SERPL-SCNC: 99 MMOL/L (ref 94–109)
CO2 SERPL-SCNC: 25 MMOL/L (ref 20–32)
CREAT SERPL-MCNC: 1.3 MG/DL (ref 0.52–1.04)
ERYTHROCYTE [DISTWIDTH] IN BLOOD BY AUTOMATED COUNT: 13.7 % (ref 10–15)
EXPTIME-PRE: 7.25 SEC
FEF2575-%PRED-PRE: 11 %
FEF2575-PRE: 0.28 L/SEC
FEF2575-PRED: 2.45 L/SEC
FEFMAX-%PRED-PRE: 34 %
FEFMAX-PRE: 2.19 L/SEC
FEFMAX-PRED: 6.44 L/SEC
FEV1-%PRED-PRE: 28 %
FEV1-PRE: 0.73 L
FEV1FEV6-PRE: 55 %
FEV1FEV6-PRED: 81 %
FEV1FVC-PRE: 54 %
FEV1FVC-PRED: 79 %
FIFMAX-PRE: 2.34 L/SEC
FVC-%PRED-PRE: 41 %
FVC-PRE: 1.35 L
FVC-PRED: 3.24 L
GFR SERPL CREATININE-BSD FRML MDRD: 42 ML/MIN/1.7M2
GLUCOSE SERPL-MCNC: 124 MG/DL (ref 70–99)
HBV SURFACE AG SERPL QL IA: NONREACTIVE
HCT VFR BLD AUTO: 33.5 % (ref 35–47)
HGB BLD-MCNC: 10.3 G/DL (ref 11.7–15.7)
INR PPP: 1.02 (ref 0.86–1.14)
MAGNESIUM SERPL-MCNC: 1.8 MG/DL (ref 1.6–2.3)
MCH RBC QN AUTO: 30.8 PG (ref 26.5–33)
MCHC RBC AUTO-ENTMCNC: 30.7 G/DL (ref 31.5–36.5)
MCV RBC AUTO: 100 FL (ref 78–100)
MYCOBACTERIUM SPEC CULT: NORMAL
MYCOBACTERIUM SPEC CULT: NORMAL
PLATELET # BLD AUTO: 412 10E9/L (ref 150–450)
POTASSIUM SERPL-SCNC: 4.4 MMOL/L (ref 3.4–5.3)
RBC # BLD AUTO: 3.34 10E12/L (ref 3.8–5.2)
SODIUM SERPL-SCNC: 134 MMOL/L (ref 133–144)
SPECIMEN SOURCE: NORMAL
TACROLIMUS BLD-MCNC: 15.5 UG/L (ref 5–15)
TME LAST DOSE: ABNORMAL H
WBC # BLD AUTO: 11.4 10E9/L (ref 4–11)

## 2018-06-05 PROCEDURE — 86832 HLA CLASS I HIGH DEFIN QUAL: CPT | Performed by: INTERNAL MEDICINE

## 2018-06-05 PROCEDURE — 85610 PROTHROMBIN TIME: CPT | Performed by: PHYSICIAN ASSISTANT

## 2018-06-05 PROCEDURE — 87798 DETECT AGENT NOS DNA AMP: CPT | Performed by: INTERNAL MEDICINE

## 2018-06-05 PROCEDURE — 87535 HIV-1 PROBE&REVERSE TRNSCRPJ: CPT | Performed by: INTERNAL MEDICINE

## 2018-06-05 PROCEDURE — 80048 BASIC METABOLIC PNL TOTAL CA: CPT | Performed by: PHYSICIAN ASSISTANT

## 2018-06-05 PROCEDURE — 85027 COMPLETE CBC AUTOMATED: CPT | Performed by: PHYSICIAN ASSISTANT

## 2018-06-05 PROCEDURE — 87521 HEPATITIS C PROBE&RVRS TRNSC: CPT | Performed by: INTERNAL MEDICINE

## 2018-06-05 PROCEDURE — 99607 MTMS BY PHARM ADDL 15 MIN: CPT | Performed by: PHARMACIST

## 2018-06-05 PROCEDURE — 87340 HEPATITIS B SURFACE AG IA: CPT | Performed by: PHYSICIAN ASSISTANT

## 2018-06-05 PROCEDURE — 83735 ASSAY OF MAGNESIUM: CPT | Performed by: PHYSICIAN ASSISTANT

## 2018-06-05 PROCEDURE — 99605 MTMS BY PHARM NP 15 MIN: CPT | Performed by: PHARMACIST

## 2018-06-05 PROCEDURE — 80197 ASSAY OF TACROLIMUS: CPT | Performed by: PHYSICIAN ASSISTANT

## 2018-06-05 PROCEDURE — 36415 COLL VENOUS BLD VENIPUNCTURE: CPT | Performed by: PHYSICIAN ASSISTANT

## 2018-06-05 PROCEDURE — G0463 HOSPITAL OUTPT CLINIC VISIT: HCPCS | Mod: ZF

## 2018-06-05 PROCEDURE — 87516 HEPATITIS B DNA AMP PROBE: CPT | Performed by: INTERNAL MEDICINE

## 2018-06-05 PROCEDURE — 86833 HLA CLASS II HIGH DEFIN QUAL: CPT | Performed by: INTERNAL MEDICINE

## 2018-06-05 RX ORDER — PENTOXIFYLLINE 400 MG/1
400 TABLET, EXTENDED RELEASE ORAL 2 TIMES DAILY
Qty: 60 TABLET | Refills: 3 | Status: SHIPPED | OUTPATIENT
Start: 2018-06-05 | End: 2018-09-20

## 2018-06-05 RX ORDER — ACETAMINOPHEN 500 MG
1000 TABLET ORAL EVERY 6 HOURS PRN
COMMUNITY
End: 2019-11-11

## 2018-06-05 RX ORDER — GABAPENTIN 100 MG/1
100 CAPSULE ORAL 2 TIMES DAILY
Qty: 14 CAPSULE | Refills: 0 | Status: SHIPPED | OUTPATIENT
Start: 2018-06-05 | End: 2018-06-11

## 2018-06-05 RX ORDER — GABAPENTIN 100 MG/1
100 CAPSULE ORAL 2 TIMES DAILY
Qty: 14 CAPSULE | Refills: 0 | Status: SHIPPED | OUTPATIENT
Start: 2018-06-05 | End: 2018-06-05

## 2018-06-05 RX ORDER — GABAPENTIN 100 MG/1
100 CAPSULE ORAL 2 TIMES DAILY
Qty: 60 CAPSULE | Refills: 3 | Status: ON HOLD | OUTPATIENT
Start: 2018-06-05 | End: 2018-08-03

## 2018-06-05 RX ORDER — PENTOXIFYLLINE 400 MG/1
400 TABLET, EXTENDED RELEASE ORAL 2 TIMES DAILY
Qty: 14 TABLET | Refills: 0 | Status: SHIPPED | OUTPATIENT
Start: 2018-06-05 | End: 2018-06-21

## 2018-06-05 ASSESSMENT — PAIN SCALES - GENERAL: PAINLEVEL: WORST PAIN (10)

## 2018-06-05 NOTE — MR AVS SNAPSHOT
After Visit Summary   6/5/2018    Kecia Blue    MRN: [id number]           Patient Information     Date Of Birth          1960        Visit Information        Provider Department      6/5/2018 11:00 AM Marcio Navarro [facility] Medication Therapy Management        Care Instructions    Recommendations from today's MTM visit:                                                      1. Talk with Dr. Christensen stopping Valcyte today.    2. Increase Acetaminophen to 1000mg three times to four times daily.     Next MTM visit: 1 year post transplant.     To schedule another MTM appointment, please call the clinic directly or you may call the MTM scheduling line at [phone] or toll-free at [phone].     My Clinical Pharmacist's contact information:                                                      It was a pleasure seeing you today!  Please feel free to contact me with any questions or concerns you have.      Marcio Navarro, PharmD  [place] Pharmacist    Phone: [phone]     You may receive a survey about the MTM services you received.  I would appreciate your feedback to help me serve you better in the future. Please fill it out and return it when you can. Your comments will be anonymous.            Follow-ups after your visit        Your next 10 appointments already scheduled     Jun 05, 2018  1:30 PM CDT   PFT VISIT with  PFL A   [facility] Pulmonary Function Testing ([facility])    [address] [place] [address] [phone]            Jun 05, 2018  2:15 PM CDT   Lab with  LAB   [facility] Lab ([facility])    [address] [address] [phone]            Jun 05, 2018  2:45 PM CDT   XR CHEST 2 VIEWS with XR1   [facility] Imaging Center Xray ([facility])    [address] [place] [address] [phone]            Please bring a list of your current medicines to your exam. (Include vitamins, minerals and over-thecounter medicines.) Leave your valuables at home.  Tell your doctor if there is a chance you may be pregnant.  You do not need to do anything special for this exam.            Jun 05, 2018  3:50 PM CDT   (Arrive by 3:35 PM)   Return Lung Transplant with Tarik Christensen MD   [facility] Solid Organ Transplant ([facility])    [address]  3rd Floor  [facility] [address] [phone]            Jun 05, 2018  5:00 PM CDT   US VENOUS with UCUSV1   [facility] Imaging Center US ([facility])    [address]  1st Floor  [facility] [address] [phone]           Please bring a list of your medicines (including vitamins, minerals and over-the-counter drugs). Also, tell your doctor about any allergies you may have. Wear comfortable clothes and leave your valuables at home.  You do not need to do anything special to prepare for your exam.  Please call the Imaging Department at your exam site with any questions.            Jun 06, 2018   Procedure with Lopez Macias MD   [facility] [place], Endoscopy ([facility])    [address] [facility] [address] [address] [phone]           The [facility] is located on the corner of [address] and [facility] on the [facility]. It is easily accessible from virtually any point in the [facility] [place] area, via [address] and [address].              Who to contact     If you have questions or need follow up information about today's clinic visit or your schedule please contact [facility] MEDICATION THERAPY MANAGEMENT directly at [phone].  Normal or non-critical lab and imaging results will be communicated to you by MyChart, letter or phone within 4 business days after the clinic has received the results. If you do not hear from us  within 7 days, please contact the clinic through [employer] or phone. If you have a critical or abnormal lab result, we will notify you by phone as soon as possible.  Submit refill requests through [employer] or call your pharmacy and they will forward the refill request to us. Please allow 3 business days for your refill to be completed.          Additional Information About Your Visit        [employer] Information     [employer] gives you secure access to your electronic health record. If you see a primary care provider, you can also send messages to your care team and make appointments. If you have questions, please call your primary care clinic.  If you do not have a primary care provider, please call [phone] and they will assist you.        Care EveryWhere ID     This is your Care EveryWhere ID. This could be used by other organizations to access your [facility] medical records  [id number]        Your Vitals Were     Last Period                   06/07/2014 (Exact Date)            Blood Pressure from Last 3 Encounters:   05/21/18 (!) 147/94   05/10/18 137/84   05/09/18 135/89    Weight from Last 3 Encounters:   05/21/18 119 lb 4.8 oz (54.1 kg)   05/18/18 123 lb (55.8 kg)   05/11/18 121 lb (54.9 kg)              Today, you had the following     No orders found for display         Today's Medication Changes          These changes are accurate as of 6/5/18 11:44 AM.  If you have any questions, ask your nurse or doctor.               These medicines have changed or have updated prescriptions.        Dose/Directions    calcium-vitamin D 600-400 MG-UNIT per tablet   Commonly known as:  CALTRATE   This may have changed:  when to take this   Used for:  S/P lung transplant (H)        Dose:  1 tablet   Take 1 tablet by mouth 2 times daily (with meals)   Quantity:  60 tablet   Refills:  11       sulfamethoxazole-trimethoprim 400-80 MG per tablet   Commonly known as:  BACTRIM/SEPTRA   Indication:  Preventative Medication Therapy  Used Around Surgery   This may have changed:  Another medication with the same name was removed. Continue taking this medication, and follow the directions you see here.   Used for:  Organ transplant candidate, Lung disease, interstitial (H), Abnormal chest CT, Hypotension, unspecified hypotension type, Acute on chronic respiratory failure with hypoxia (H), ILD (interstitial lung disease) (H)   Changed by:  Marcio Navarro [facility]        Dose:  1 tablet   1 tablet by Oral or NG Tube route daily   Quantity:  30 tablet   Refills:  11         Stop taking these medicines if you haven't already. Please contact your care team if you have questions.     polyethylene glycol Packet   Commonly known as:  MIRALAX/GLYCOLAX   Stopped by:  Marcio Navarro [facility]                    Primary Care Provider Office Phone # Fax #    Rosy Vu [phone]       [facility]  [address]  [facility] [address]        Equal Access to Services     [facility] HospitalBRODERICK : Hadii aad ku hadasho Soomaali, waaxda luqadaha, qaybta kaalmada adeegyada, waxay yoliin haydionyn tammy daily . So [facility] [phone].    ATENCIÓN: Si habla español, tiene a taylor disposición servicios gratuitos de asistencia lingüística. Zachary al [phone].    We comply with applicable federal civil rights laws and [place] laws. We do not discriminate on the basis of race, color, national origin, age, disability, sex, sexual orientation, or gender identity.            Thank you!     Thank you for choosing [facility] MEDICATION THERAPY MANAGEMENT  for your care. Our goal is always to provide you with excellent care. Hearing back from our patients is one way we can continue to improve our services. Please take a few minutes to complete the written survey that you may receive in the mail after your visit with us. Thank you!             Your Updated Medication List - Protect others around you: Learn how to safely use, store and throw  away your medicines at www.disposemymeds.org.          This list is accurate as of 6/5/18 11:44 AM.  Always use your most recent med list.                   Brand Name Dispense Instructions for use Diagnosis    ACETAMINOPHEN PO      Take 1,000 mg by mouth every 6 hours as needed for pain        albuterol 108 (90 Base) MCG/ACT Inhaler    PROAIR HFA/PROVENTIL HFA/VENTOLIN HFA    1 Inhaler    Inhale 2 puffs into the lungs every 6 hours as needed for shortness of breath / dyspnea or wheezing    SOB (shortness of breath), Wheezing       calcium-vitamin D 600-400 MG-UNIT per tablet    CALTRATE    60 tablet    Take 1 tablet by mouth 2 times daily (with meals)    S/P lung transplant (H)       enoxaparin 60 MG/0.6ML injection    LOVENOX    25 Syringe    Inject 60 mg subcutaneously every 12 hours or as directed by Anticoagulation Clinic.    Acute deep vein thrombosis (DVT) of left upper extremity, unspecified vein (H), Lung transplant recipient (H)       levalbuterol 45 MCG/ACT Inhaler    XOPENEX HFA    1 Inhaler    Inhale 2 puffs into the lungs every 6 hours as needed for shortness of breath / dyspnea or wheezing    S/P lung transplant (H), Other constipation       magnesium oxide 400 MG tablet    MAG-OX    30 tablet    Take 1 tablet (400 mg) by mouth daily    Hypomagnesemia       metoprolol tartrate 25 MG tablet    LOPRESSOR    60 tablet    Take 1 tablet (25 mg) by mouth 2 times daily    Paroxysmal atrial fibrillation (H)       multivitamin, therapeutic with minerals Tabs tablet     30 each    Take 1 tablet by mouth daily    Lung transplant recipient (H)       mycophenolate 250 MG capsule    GENERIC EQUIVALENT    360 capsule    Take 6 capsules (1,500 mg) by mouth 2 times daily    S/P lung transplant (H)       nystatin [id number] UNIT/ML suspension    MYCOSTATIN    1200 mL    Take 10 mLs (1,000,000 Units) by mouth 4 times daily    Lung transplant recipient (H)       order for DME     1 Device    Equipment being ordered: Oxygen 5  liters at rest, 15 liters with exertion.  Needs portability.  Please provide 2 10-liter concentrators for in the home    ILD (interstitial lung disease) (H), Hypoxia       pantoprazole 40 MG EC tablet    PROTONIX    30 tablet    Take 1 tablet (40 mg) by mouth every morning    Gastroesophageal reflux disease without esophagitis       * predniSONE 2.5 MG tablet    DELTASONE    60 tablet    Take 1 tablet (2.5 mg) by mouth daily Follow taper card    Lung replaced by transplant (H)       * predniSONE 5 MG tablet    DELTASONE    300 tablet    Follow taper card    S/P lung transplant (H)       sulfamethoxazole-trimethoprim 400-80 MG per tablet    BACTRIM/SEPTRA    30 tablet    1 tablet by Oral or NG Tube route daily    Organ transplant candidate, Lung disease, interstitial (H), Abnormal chest CT, Hypotension, unspecified hypotension type, Acute on chronic respiratory failure with hypoxia (H), ILD (interstitial lung disease) (H)       tacrolimus 1 MG capsule    GENERIC EQUIVALENT    330 capsule    Take 5 mg in the AM and 6 mg in the PM    S/P lung transplant (H), Other constipation       valGANciclovir 450 MG tablet    VALCYTE    60 tablet    Take 2 tablets (900 mg) by mouth daily    S/P lung transplant (H)       warfarin 1 MG tablet    COUMADIN    140 tablet    Take 4mg MWFSat and 6mg TuThSun, or as directed by the Medication Monitoring Clinic at the  of .    DVT (deep venous thrombosis) (H), Status post lung transplantation (H)       * Notice:  This list has 2 medication(s) that are the same as other medications prescribed for you. Read the directions carefully, and ask your doctor or other care provider to review them with you.

## 2018-06-05 NOTE — MR AVS SNAPSHOT
After Visit Summary   6/5/2018    Kecia Blue    MRN: [id number]           Patient Information     Date Of Birth          1960        Visit Information        Provider Department      6/5/2018 3:50 PM Tarik Christensen MD [facility] Solid Organ Transplant        Today's Diagnoses     Atypical chest pain    -  1    Lung transplant recipient (H)          Care Instructions    Patient Instructions  1. Drink plenty of fluids every day. 60 ounces or more.   2. Bronch tomorrow.  3. Start gabapentin 100 mg twice a day for nerve pain. Let Evelyn know if this doesn't help with chest pains.    4. Start pentoxifylline twice a day for increased wound healing.   5. Stop the Valcyte. Keep the medication. You might need this again in the future.   6. Have a CMV level and DSA checked every 2 weeks.   7. Record your heart rate in the book.     You are scheduled for a bronchoscopy on 6/6/18 at 0800 at the [facility] on 1st floor. Arrive 1 hour early.     Instructions     1. Nothing to eat or drink 8 hours before procedure.  2. Hold your morning medications. Bring them with you to take after the procedure.  3. Have a  available to take you home.     Next transplant clinic appointment: 3-4 weeks with CXR, labs and PFTs  Next lab draw: with clinic appointment.       AVS printed at time of check out                  Follow-ups after your visit        Follow-up notes from your care team     Return in about 3 weeks (around 6/26/2018).      Your next 10 appointments already scheduled     Jun 05, 2018  5:00 PM CDT   US VENOUS with [email or web address]   [facility] US ([facility])    [address] [place] [address]  1st [facility] [address] [phone]           Please bring a list of your medicines (including vitamins, minerals and over-the-counter drugs). Also, tell your doctor about any allergies you may have. Wear comfortable clothes and  leave your valuables at home.  You do not need to do anything special to prepare for your exam.  Please call the Imaging Department at your exam site with any questions.            Jun 06, 2018   Procedure with Lopez Macias MD   [facility] [place] End[facility] ([facility])    [address]  [place] [address] [phone]           The [facility] is located on the corner of [facility] and [facility] on the [facility]. It is easily accessible from virtually any point in the [facility] area, via [address] and [address].            Jul 09, 2018  9:00 AM CDT   Lab with  LAB   [facility] ([facility])    [address] [place] [id number] [phone]            Jul 09, 2018  9:45 AM CDT   XR CHEST 2 VIEWS with [email or web address]   [facility] ([facility])    [address] [place] [id number] [phone]           Please bring a list of your current medicines to your exam. (Include vitamins, minerals and over-thecounter medicines.) Leave your valuables at home.  Tell your doctor if there is a chance you may be pregnant.  You do not need to do anything special for this exam.            Jul 09, 2018 10:00 AM CDT   PFT VISIT with  PFL RENZO   [facility] Testing ([facility])    [address] [place] [id number] [phone]            Jul 09, 2018 10:40 AM CDT   (Arrive by 10:25 AM)   Return Lung Transplant with Alex Hale MD   [facility]plant ([facility])    [address] [place] [id number] [phone]              Future tests that were ordered for you today     Open Future Orders        Priority Expected Expires Ordered    CBC with platelets differential Routine 7/5/2018 7/5/2018  "6/5/2018    Comprehensive metabolic panel Routine 7/5/2018 7/5/2018 6/5/2018    CMV DNA quantification Routine 7/5/2018 7/5/2018 6/5/2018    INR Routine 7/5/2018 7/5/2018 6/5/2018    Tacrolimus level Routine 7/5/2018 7/5/2018 6/5/2018    XR Chest 2 Views Routine 7/5/2018 7/5/2018 6/5/2018    RESPIRATORY FLOW VOLUME LOOP Routine 7/5/2018 7/5/2018 6/5/2018    PRA Donor Specific Antibody Routine 7/5/2018 7/5/2018 6/5/2018            Who to contact     If you have questions or need follow up information about today's clinic visit or your schedule please contact [facility] SOLID ORGAN TRANSPLANT directly at [phone].  Normal or non-critical lab and imaging results will be communicated to you by [employer], letter or phone within 4 business days after the clinic has received the results. If you do not hear from us within 7 days, please contact the clinic through [employer] or phone. If you have a critical or abnormal lab result, we will notify you by phone as soon as possible.  Submit refill requests through [employer] or call your pharmacy and they will forward the refill request to us. Please allow 3 business days for your refill to be completed.          Additional Information About Your Visit        [employer] Information     [employer] gives you secure access to your electronic health record. If you see a primary care provider, you can also send messages to your care team and make appointments. If you have questions, please call your primary care clinic.  If you do not have a primary care provider, please call [phone] and they will assist you.        Care EveryWhere ID     This is your Care EveryWhere ID. This could be used by other organizations to access your [place] medical records  [id number]        Your Vitals Were     Pulse Temperature Height Last Period Pulse Oximetry BMI (Body Mass Index)    100 97.8  F (36.6  C) (Oral) 1.626 m (5' 4\") 06/07/2014 (Exact Date) 97% 20.43 kg/m2       Blood Pressure from Last 3 " Encounters:   06/05/18 148/90   05/21/18 (!) 147/94   05/10/18 137/84    Weight from Last 3 Encounters:   06/05/18 54 kg (119 lb)   05/21/18 54.1 kg (119 lb 4.8 oz)   05/18/18 55.8 kg (123 lb)                 Today's Medication Changes          These changes are accurate as of 6/5/18  4:55 PM.  If you have any questions, ask your nurse or doctor.               Start taking these medicines.        Dose/Directions    * gabapentin 100 MG capsule   Commonly known as:  NEURONTIN   Used for:  Atypical chest pain, Lung transplant recipient (H)   Started by:  Tarik Christensen MD        Dose:  100 mg   Take 1 capsule (100 mg) by mouth 2 times daily   Quantity:  60 capsule   Refills:  3       * gabapentin 100 MG capsule   Commonly known as:  NEURONTIN   Used for:  Atypical chest pain, Lung transplant recipient (H)   Started by:  Tarik Christensen MD        Dose:  100 mg   Take 1 capsule (100 mg) by mouth 2 times daily   Quantity:  14 capsule   Refills:  0       * pentoxifylline 400 MG CR tablet   Commonly known as:  TRENtal   Used for:  Lung transplant recipient (H)   Started by:  Tarik Christensen MD        Dose:  400 mg   Take 1 tablet (400 mg) by mouth 2 times daily   Quantity:  60 tablet   Refills:  3       * pentoxifylline 400 MG CR tablet   Commonly known as:  TRENtal   Used for:  Lung transplant recipient (H), Atypical chest pain   Started by:  Tarik Christensen MD        Dose:  400 mg   Take 1 tablet (400 mg) by mouth 2 times daily   Quantity:  14 tablet   Refills:  0       * Notice:  This list has 4 medication(s) that are the same as other medications prescribed for you. Read the directions carefully, and ask your doctor or other care provider to review them with you.      These medicines have changed or have updated prescriptions.        Dose/Directions    calcium-vitamin D 600-400 MG-UNIT per tablet   Commonly known as:  CALTRATE   This may have changed:  when to take this   Used for:  S/P lung transplant  (H)        Dose:  1 tablet   Take 1 tablet by mouth 2 times daily (with meals)   Quantity:  60 tablet   Refills:  11       sulfamethoxazole-trimethoprim 400-80 MG per tablet   Commonly known as:  BACTRIM/SEPTRA   Indication:  Preventative Medication Therapy Used Around Surgery   This may have changed:  Another medication with the same name was removed. Continue taking this medication, and follow the directions you see here.   Used for:  Organ transplant candidate, Lung disease, interstitial (H), Abnormal chest CT, Hypotension, unspecified hypotension type, Acute on chronic respiratory failure with hypoxia (H), ILD (interstitial lung disease) (H)   Changed by:  Marcio Navarro RPH        Dose:  1 tablet   1 tablet by Oral or NG Tube route daily   Quantity:  30 tablet   Refills:  11         Stop taking these medicines if you haven't already. Please contact your care team if you have questions.     polyethylene glycol Packet   Commonly known as:  MIRALAX/GLYCOLAX   Stopped by:  Marcio Navarro [facility]           valGANciclovir 450 MG tablet   Commonly known as:  VALCYTE   Stopped by:  Tarik Christensen MD                Where to get your medicines      These medications were sent to [place] [id number] [address] [address]    Hours:  TRANSPLANT PHONE NUMBER [phone] Phone:  [phone]     gabapentin 100 MG capsule    pentoxifylline 400 MG CR tablet         These medications were sent to [facility] - [facility] [address]  [address]  [address], [facility] [address]     Phone:  [phone]     gabapentin 100 MG capsule    pentoxifylline 400 MG CR tablet                Primary Care Provider Office Phone # Fax #    Rosy Vu [phone]       [facility]  [address]  [facility] [address]        Equal Access to Services      ALBAN VALENCIA : Hadii aad ku esteban Lucas, waaxda luqadaha, qaybta kaalmada татьяна, morro pennie marceleloina meadowslinwoodofelia daily . So [facility] [phone].    ATENCIÓN: Si habla espjay, tiene a taylor disposición servicios gratuitos de asistencia lingüística. Llame al [phone].    We comply with applicable federal civil rights laws and [place] laws. We do not discriminate on the basis of race, color, national origin, age, disability, sex, sexual orientation, or gender identity.            Thank you!     Thank you for choosing [facility] SOLID ORGAN TRANSPLANT  for your care. Our goal is always to provide you with excellent care. Hearing back from our patients is one way we can continue to improve our services. Please take a few minutes to complete the written survey that you may receive in the mail after your visit with us. Thank you!             Your Updated Medication List - Protect others around you: Learn how to safely use, store and throw away your medicines at www.disposemymeds.org.          This list is accurate as of 6/5/18  4:55 PM.  Always use your most recent med list.                   Brand Name Dispense Instructions for use Diagnosis    ACETAMINOPHEN PO      Take 1,000 mg by mouth every 6 hours as needed for pain        albuterol 108 (90 Base) MCG/ACT Inhaler    PROAIR HFA/PROVENTIL HFA/VENTOLIN HFA    1 Inhaler    Inhale 2 puffs into the lungs every 6 hours as needed for shortness of breath / dyspnea or wheezing    SOB (shortness of breath), Wheezing       calcium-vitamin D 600-400 MG-UNIT per tablet    CALTRATE    60 tablet    Take 1 tablet by mouth 2 times daily (with meals)    S/P lung transplant (H)       enoxaparin 60 MG/0.6ML injection    LOVENOX    25 Syringe    Inject 60 mg subcutaneously every 12 hours or as directed by Anticoagulation Clinic.    Acute deep vein thrombosis (DVT) of left upper extremity, unspecified vein (H), Lung transplant recipient (H)       * gabapentin 100 MG capsule     NEURONTIN    60 capsule    Take 1 capsule (100 mg) by mouth 2 times daily    Atypical chest pain, Lung transplant recipient (H)       * gabapentin 100 MG capsule    NEURONTIN    14 capsule    Take 1 capsule (100 mg) by mouth 2 times daily    Atypical chest pain, Lung transplant recipient (H)       levalbuterol 45 MCG/ACT Inhaler    XOPENEX HFA    1 Inhaler    Inhale 2 puffs into the lungs every 6 hours as needed for shortness of breath / dyspnea or wheezing    S/P lung transplant (H), Other constipation       magnesium oxide 400 MG tablet    MAG-OX    30 tablet    Take 1 tablet (400 mg) by mouth daily    Hypomagnesemia       metoprolol tartrate 25 MG tablet    LOPRESSOR    60 tablet    Take 1 tablet (25 mg) by mouth 2 times daily    Paroxysmal atrial fibrillation (H)       multivitamin, therapeutic with minerals Tabs tablet     30 each    Take 1 tablet by mouth daily    Lung transplant recipient (H)       mycophenolate 250 MG capsule    GENERIC EQUIVALENT    360 capsule    Take 6 capsules (1,500 mg) by mouth 2 times daily    S/P lung transplant (H)       nystatin 407559 UNIT/ML suspension    MYCOSTATIN    1200 mL    Take 10 mLs (1,000,000 Units) by mouth 4 times daily    Lung transplant recipient (H)       order for DME     1 Device    Equipment being ordered: Oxygen 5 liters at rest, 15 liters with exertion.  Needs portability.  Please provide 2 10-liter concentrators for in the home    ILD (interstitial lung disease) (H), Hypoxia       pantoprazole 40 MG EC tablet    PROTONIX    30 tablet    Take 1 tablet (40 mg) by mouth every morning    Gastroesophageal reflux disease without esophagitis       * pentoxifylline 400 MG CR tablet    TRENtal    60 tablet    Take 1 tablet (400 mg) by mouth 2 times daily    Lung transplant recipient (H)       * pentoxifylline 400 MG CR tablet    TRENtal    14 tablet    Take 1 tablet (400 mg) by mouth 2 times daily    Lung transplant recipient (H), Atypical chest pain       *  predniSONE 2.5 MG tablet    DELTASONE    60 tablet    Take 1 tablet (2.5 mg) by mouth daily Follow taper card    Lung replaced by transplant (H)       * predniSONE 5 MG tablet    DELTASONE    300 tablet    Follow taper card    S/P lung transplant (H)       sulfamethoxazole-trimethoprim 400-80 MG per tablet    BACTRIM/SEPTRA    30 tablet    1 tablet by Oral or NG Tube route daily    Organ transplant candidate, Lung disease, interstitial (H), Abnormal chest CT, Hypotension, unspecified hypotension type, Acute on chronic respiratory failure with hypoxia (H), ILD (interstitial lung disease) (H)       tacrolimus 1 MG capsule    GENERIC EQUIVALENT    330 capsule    Take 5 mg in the AM and 6 mg in the PM    S/P lung transplant (H), Other constipation       warfarin 1 MG tablet    COUMADIN    140 tablet    Take 4mg MWFSat and 6mg TuThSun, or as directed by the [facility] at the [facility]    DVT (deep venous thrombosis) (H), Status post lung transplantation (H)       * Notice:  This list has 6 medication(s) that are the same as other medications prescribed for you. Read the directions carefully, and ask your doctor or other care provider to review them with you.

## 2018-06-05 NOTE — PROGRESS NOTES
"Reason for Visit  Kecia Blue is a 57-year-old female who is being seen for RECHECK (double lung transpant march 1st )    Lung TX HPI  Kecia Blue is a 57-year-old female with a history of dermatomyositis, seronegative rheumatoid arthritis, interstitial lung disease, and pulmonary hypertension who was admitted to the hospital with acute worsening of chronic hypoxic respiratory failure in 02/2018 and progressed [facility] for right heart failure and subsequently underwent bilateral sequential lung transplant on 03/01/2018.  Her post-transplant course was generally uncomplicated; however, she needed pleural effusion tap on the right side later.    Interval HPI   The patient is not \"doing so great\". She has a terrible cough that she has had \"forever\", but exacerbated over the last two weeks. The cough starts mildly early in the day and then will progress to somewhat productive cough that produces a little amount of white sputum. She does endorse wheezing, which is worse at night. To alleviate or improve her wheezing, she will cough or use her inhaler. She has to use her inhaler a couple times a day. Typically, when she coughs, she does have to blow her nose and this is normal for her.   The patient does have \"a little bit\" of shortness of breath while active and this has gotten worse in the last two weeks as well. Shortness of breath usually occurs while walking or doing exercises. She is doing less exercises than two weeks ago. The patient notes that she did pull something in her knee at her last rehab session. No lower extremity edema or shortness of breath while lying down.   The patient can sleep; however, she does wake up a couple of times a night secondary to wheezing or having to use the bathroom. She does not wake up due to frequent coughing. Her appetite is still low, but no difference from before the transplant. She just has a \"full\" sensation; not because of nausea. No heartburn or bloating.   The " "patient does experience chest pain that hurts across the upper chest, mostly just right of the mediastinum. She was unable to do the last biopsy due to sutures. The chest pain has gotten worse in the last two weeks and coughing also exacerbates her chest pain. She describes the chest pain as tight - \"everything is shrunk in\". No pain with palpation. Sometimes the chest is numb, but no shooting pain or pain below the breasts. No rashes noted about the chest; however, she does have a little \"bump\" - about the size of pea - about the incision scar. Her incision scar is not sensitive. Taking Tylenol 1,000 mg TID does not significantly improve her chest pain; tries to only do 1,000 mg BID. Chest pain does not limit breathing test.   The patient has had loose stools for a couple of days but, now, back to normal. No rashes or arthralgia. No unusual weakness, numbness, or tingling. No fevers, chills, or sweats.    Current Outpatient Prescriptions   Medication     ACETAMINOPHEN PO     albuterol (PROAIR HFA/PROVENTIL HFA/VENTOLIN HFA) 108 (90 Base) MCG/ACT Inhaler     calcium-vitamin D (CALTRATE) 600-400 MG-UNIT per tablet     enoxaparin (LOVENOX) 60 MG/0.6ML injection     levalbuterol (XOPENEX HFA) 45 MCG/ACT Inhaler     magnesium oxide (MAG-OX) 400 MG tablet     metoprolol tartrate (LOPRESSOR) 25 MG tablet     multivitamin, therapeutic with minerals (THERA-VIT-M) TABS tablet     mycophenolate (GENERIC EQUIVALENT) 250 MG capsule     nystatin (MYCOSTATIN) 242822 UNIT/ML suspension     order for DME     pantoprazole (PROTONIX) 40 MG EC tablet     predniSONE (DELTASONE) 2.5 MG tablet     predniSONE (DELTASONE) 5 MG tablet     sulfamethoxazole-trimethoprim (BACTRIM/SEPTRA) 400-80 MG per tablet     tacrolimus (GENERIC EQUIVALENT) 1 MG capsule     valGANciclovir (VALCYTE) 450 MG tablet     warfarin (COUMADIN) 1 MG tablet     No current facility-administered medications for this visit.      No Known Allergies    Past Medical " History:   Diagnosis Date     Antisynthetase syndrome (H) 2014     Chronic cough      Dermatomyositis (H)      Dysplasia of cervix, low grade (ESTRADA 1)      ILD (interstitial lung disease) (H)      Osteopenia      Pulmonary hypertension      Raynaud's disease      Seronegative rheumatoid arthritis (H)      Past Surgical History:   Procedure Laterality Date     BRONCHOSCOPY (RIGID OR FLEXIBLE), DIAGNOSTIC N/A 4/10/2018    Procedure: COMBINED BRONCHOSCOPY (RIGID OR FLEXIBLE), LAVAGE;;  Surgeon: Mariposa Donohue MD;  Location:  GI     BRONCHOSCOPY FLEXIBLE N/A 3/13/2018    Procedure: BRONCHOSCOPY FLEXIBLE;  Flexible Bronchoscopy ;  Surgeon: Gissell Sanchez MD;  Location:  GI     BRONCHOSCOPY FLEXIBLE N/A 5/9/2018    Procedure: BRONCHOSCOPY FLEXIBLE;;  Surgeon: Wilbre Lin MD;  Location:  GI     ENT SURGERY      tonsillectomy as a child     INSERT EXTRACORPORAL MEMBRANE OXYGENATOR ADULT (OUTSIDE OR) N/A 2/27/2018    Procedure: INSERT EXTRACORPORAL MEMBRANE OXYGENATOR ADULT (OUTSIDE OR);  INSERT EXTRACORPORAL MEMBRANE OXYGENATOR ADULT (OUTSIDE OR) ;  Surgeon: Toby Hernandez MD;  Location:  OR     no prior surgery       REMOVE EXTRACORPORAL MEMBRANE OXYGENATOR ADULT N/A 3/3/2018    Procedure: REMOVE EXTRACORPORAL MEMBRANE OXYGENATOR ADULT;  Removal of Right Femoral Venous and Right Axillary Arterial Extracorporeal Membrane Oxygenator;  Surgeon: Toby Hernandez MD;  Location:  OR     TRANSPLANT LUNG RECIPIENT SINGLE X2 Bilateral 3/1/2018    Procedure: TRANSPLANT LUNG RECIPIENT SINGLE X2;  Median Sternotomy, Extracorporeal Membrane Oxygenator, bilateral sequential lung transplant;  Surgeon: Toby Hernandez MD;  Location:  OR     Social History     Social History     Marital status:      Spouse name: N/A     Number of children: N/A     Years of education: N/A     Occupational History     Not on file.     Social History Main Topics     Smoking status:  "Never Smoker     Smokeless tobacco: Never Used     Alcohol use No     Drug use: No     Sexual activity: Not on file     Other Topics Concern     Parent/Sibling W/ Cabg, Mi Or Angioplasty Before 65f 55m? No     Social History Narrative     Family History   Problem Relation Age of Onset     Hypertension Mother      Arthritis Mother      Pancreatic Cancer Father      DIABETES Father      Pulmonary ROS  Constitutional- Negative  Eyes- Negative  Ear, nose and throat- Negative  Cardiac- Negative  Pulm- See HPI  GI- Positive. Did have a couple of days of loose stools, but now has resolved.  Genitourinary- Negative  Musculoskeletal- Positive. Chest pain across the upper chest, mostly just right of mediastinum.  Neurology- Negative  Dermatology- Negative  Endocrine- Negative  Lymphatic- Negative  Psychiatry- Negative  A complete ROS was otherwise negative except as noted in the HPI.    /90 (BP Location: Right arm, Patient Position: Sitting)  Pulse 100  Temp 97.8  F (36.6  C) (Oral)  Ht 1.626 m (5' 4\")  Wt 54 kg (119 lb)  LMP 06/07/2014 (Exact Date)  SpO2 97%  BMI 20.43 kg/m2  Physical Exam  GENERAL APPEARANCE: Alert, Oriented x3. Not in distress.  EYES: PERRL, EOMI  HENT: Nasal mucosa with no hyperemia and no edema, no nasal polyps.  MOUTH: Oral mucosa is moist, without any lesions, no tonsillar enlargement, no oropharyngeal exudate.  NECK: Supple, no masses, no thyromegaly.  LYMPHATICS: No significant axillary, cervical, or supraclavicular nodes.  RESP: Normal percussion, good air flow throughout Left lung. Rt. lung insp wheeze heard and poor exhalation.  CV: Normal S1, S2, regular rhythm, normal rate, no rub, no murmur,  no gallop. Trace right lower extremity edema, no left lower extremity edema.  ABDOMEN: Bowel sounds normal, soft, nontender, no HSM or masses.  MS: Extremities normal, no clubbing, no cyanosis.   SKIN: No rash on limited exam.  NEURO: Mentation intact, speech normal, normal strength and tone, " normal gait, and stance.  PSYCH: Mentation appears normal, and affect normal/bright.    Recent Results (from the past 168 hour(s))   Basic metabolic panel    Collection Time: 06/05/18 10:29 AM   Result Value Ref Range    Sodium 134 133 - 144 mmol/L    Potassium 4.4 3.4 - 5.3 mmol/L    Chloride 99 94 - 109 mmol/L    Carbon Dioxide 25 20 - 32 mmol/L    Anion Gap 9 3 - 14 mmol/L    Glucose 124 (H) 70 - 99 mg/dL    Urea Nitrogen 31 (H) 7 - 30 mg/dL    Creatinine 1.30 (H) 0.52 - 1.04 mg/dL    GFR Estimate 42 (L) >60 mL/min/1.7m2    GFR Estimate If Black 51 (L) >60 mL/min/1.7m2    Calcium 9.5 8.5 - 10.1 mg/dL   Magnesium    Collection Time: 06/05/18 10:29 AM   Result Value Ref Range    Magnesium 1.8 1.6 - 2.3 mg/dL   CBC with platelets    Collection Time: 06/05/18 10:29 AM   Result Value Ref Range    WBC 11.4 (H) 4.0 - 11.0 10e9/L    RBC Count 3.34 (L) 3.8 - 5.2 10e12/L    Hemoglobin 10.3 (L) 11.7 - 15.7 g/dL    Hematocrit 33.5 (L) 35.0 - 47.0 %     78 - 100 fl    MCH 30.8 26.5 - 33.0 pg    MCHC 30.7 (L) 31.5 - 36.5 g/dL    RDW 13.7 10.0 - 15.0 %    Platelet Count 412 150 - 450 10e9/L   INR    Collection Time: 06/05/18 10:29 AM   Result Value Ref Range    INR 1.02 0.86 - 1.14   Hepatitis B surface antigen    Collection Time: 06/05/18 10:29 AM   Result Value Ref Range    Hep B Surface Agn Nonreactive NR^Nonreactive   General PFT Lab (Please always keep checked)    Collection Time: 06/05/18 11:40 AM   Result Value Ref Range    FVC-Pred 3.24 L    FVC-Pre 1.35 L    FVC-%Pred-Pre 41 %    FEV1-Pre 0.73 L    FEV1-%Pred-Pre 28 %    FEV1FVC-Pred 79 %    FEV1FVC-Pre 54 %    FEFMax-Pred 6.44 L/sec    FEFMax-Pre 2.19 L/sec    FEFMax-%Pred-Pre 34 %    FEF2575-Pred 2.45 L/sec    FEF2575-Pre 0.28 L/sec    BLW5048-%Pred-Pre 11 %    ExpTime-Pre 7.25 sec    FIFMax-Pre 2.34 L/sec    FEV1FEV6-Pred 81 %    FEV1FEV6-Pre 55 %                 Results as noted above.    PFT Interpretation:  Severe obstructive ventilatory defect along with  severe restriction.  FEV1 decreased from previous by 360ml.  Below recent best.   Valid Maneuver      CXR (6/5/2018), personally reviewed by me: The Lung fields are clear. No effusion. Cardiac silhouette is wnl.      Assessment and Plan: Kecia Blue is a 57-year-old female with a history of dermatomyositis, seronegative rheumatoid arthritis, interstitial lung disease, and pulmonary hypertension who was admitted to the hospital with acute worsening of chronic hypoxic respiratory failure in 02/2018 and progressed [facility] for right heart failure and subsequently underwent bilateral sequential lung transplant on 03/01/2018. Her post-transplant course was generally uncomplicated; however, she needed pleural effusion tap on the right side later.     # Bilateral Lung Transplant: She severe restriction with low PFT's. Etiology unclear - ?chest wall restriction. Possible diaphram weakness/paralysis. Deconditioning might be playing a role.  Current IS regimen:   - Tacrolimus with a goal of 10-15 nanograms/mL, mycophenolate 1500 mg twice a day and prednisone taper per protocol.    DSA positive since 5/8/18 - it is unchanged on 5/16/18. It is DQB7.    6/5/2018:  Her last transbronchial biopsy was performed about 2 months ago which showed no acute cellular rejection; however, some foci of organizing pneumonia were noted. DSA from today is pending. FEV1 today has decreased from previous evaluation by 360 mL. CXR stable.    -  MOst likely etiology is Rt. mainstem narrowing, will proceed with bronch/BAL tomorrow. No biopsy.  - Bronchoscopy tomorrow.    # Infectious Disease:   Bactrim for PCP prophylaxis, nystatin for oral candidiasis prophylaxis, and valganciclovir for CMV prophylaxis.  6/5/2018: Will discontinue Valganciclovir.  - Follow CMV laboratory studies every two weeks for the next 3 months.    # Hypomagnesemia: The patient remains on magnesium replacement. Her magnesium level today is acceptable.    # Provoked Left  "Upper Extremity Clot and Right IJ Clot (3/7/18): The patient is on warfarin, which will be continued for 3 months from the time of initial clot discovery with a goal INR of 2-3. Her anticoagulation will be managed around her transbronchial biopsy scheduled for some time next week.  6/5/2018: Repeat US scheduled for today. She is holding Lovenox due to bronchoscopy tomorrow. The patient has been on blood thinner since 3/7/2018. If there is no clot then stop anticoag. If still present then continue anticoag but restart once a plan is in place for airway anastomostic stenosis.    # Dermatomyositis with Raynaud's Phenomenon: The patient is awaiting to be seen by rheumatology.  - Myositis panel is positive.    # Hypertension: This appears to be well treated with Metoprolol 25 mg twice a day which will be continued.  6/5/2018: Blood pressure under acceptable control at home.  - Instructed to continue to monitor blood pressure and begin monitor heart rate at home.  - May need to increase Metoprolol if blood pressure becomes elevated.  - Normal Echo radha 3/20/2018.    # Right Anterior Chest Wall Pain: There is no evidence of any significant pathology as a cause of the pain. We checked her blood pressures in both arms and these were similar, thus making aortic pathology unlikely. At this point, I do not see a reason to pursue any further workup; however, the patient was advised to call us if the pain worsens or does not resolve in the next 1-2 weeks, at which time I would probably start with a CT scan of her chest to evaluate this further.  6/5/2018: Chest pain continues; exacerbates with coughing.  - Will trial her on Gabapentin     # Hoarse Voice: She does complain of some hoarseness and is wondering if she can be referred to ENT to address a vocal cord polyp seen at there last bronch.  6/5/2018: Raspy still; \"not what it was\". Polyp was noted on vocal cords. She was instructed to follow-up with ENT closer to home and she " has not yet, but she will.      RTC in 3 to 4 weeks with labs including spirometry and xray due to drop in FEV1.      Scribe Disclosure:   I, Jacinto Duong, am serving as a scribe; to document services personally performed by Tarik Christensen MD based on data collection and the provider's statements to me.     Provider Disclosure:  I agree with above History, Review of Systems, Physical exam and Plan. I have reviewed the content of the documentation and have edited it as needed. I have personally performed the services documented here and the documentation accurately represents those services and the decisions I have made.      Electronically signed by:  Tarik Christensen MD.

## 2018-06-05 NOTE — PATIENT INSTRUCTIONS
Recommendations from today's MTM visit:                                                      1. Talk with Dr. Christensen stopping Valcyte today.    2. Increase Acetaminophen to 1000mg three times to four times daily.     Next MTM visit: 1 year post transplant.     To schedule another MTM appointment, please call the clinic directly or you may call the MTM scheduling line at [phone] or toll-free at [phone].     My Clinical Pharmacist's contact information:                                                      It was a pleasure seeing you today!  Please feel free to contact me with any questions or concerns you have.      Marcio Navarro, PharmD  MTM Pharmacist    Phone: [phone]     You may receive a survey about the MTM services you received.  I would appreciate your feedback to help me serve you better in the future. Please fill it out and return it when you can. Your comments will be anonymous.

## 2018-06-05 NOTE — NURSING NOTE
"Chief Complaint   Patient presents with     RECHECK     double lung transpant march 1st    /90 (BP Location: Right arm, Patient Position: Sitting)  Pulse 100  Temp 97.8  F (36.6  C) (Oral)  Ht 1.626 m (5' 4\")  Wt 54 kg (119 lb)  LMP 06/07/2014 (Exact Date)  SpO2 97%  BMI 20.43 kg/m2  fabrice  "

## 2018-06-05 NOTE — PATIENT INSTRUCTIONS
Patient Instructions  1. Drink plenty of fluids every day. 60 ounces or more.   2. Bronch tomorrow.  3. Start gabapentin 100 mg twice a day for nerve pain. Let Evelyn know if this doesn't help with chest pains.    4. Start pentoxifylline twice a day for increased wound healing.   5. Stop the Valcyte. Keep the medication. You might need this again in the future.   6. Have a CMV level and DSA checked every 2 weeks.   7. Record your heart rate in the book.     You are scheduled for a bronchoscopy on 6/6/18 at 0800 at the [facility] on 1st floor. Arrive 1 hour early.     Instructions     1. Nothing to eat or drink 8 hours before procedure.  2. Hold your morning medications. Bring them with you to take after the procedure.  3. Have a  available to take you home.     Next transplant clinic appointment: 3-4 weeks with CXR, labs and PFTs  Next lab draw: with clinic appointment.       AVS printed at time of check out

## 2018-06-05 NOTE — PROGRESS NOTES
SUBJECTIVE/OBJECTIVE:                           Kecia Blue is a 57 year old female coming in for an initial visit for Medication Therapy Management.  She was referred to me from txp team.     Chief Complaint: 3 month post transplant medication review. Pt states she had a cough since one month after transplant has worsened in the last couple weeks.     Allergies/ADRs: None  Tobacco: No tobacco use  Alcohol: not currently using  Caffeine: 12 ounces TIW of tea  Activity: walking around the house able to, knee injured in PT.  says no real physical activity since 5/22.  PMH: Reviewed in Epic    Medication Adherence/Access:  Patient uses pill box(es),  assists.  Patient takes medications 3 time(s) per day. 8AM, noon, 8pm.  Per patient, misses medication 3 times per week. Was on a lot of medications before txp too.   The patient fills medications at [facility]: NO, fills medications at [facility] per insurance.     Lung Transplant:  Current immunosuppressants include MMF 1500mg BID (no diarrhea per patient), TAC 5mg qAM and 6mg qPM, Prednisone 10mg qAM, 7.5mg qPM (no issues with insomnia).   Pt reports no side effects  Transplant date: 3/1/18, discharged March 28th.   Estimated Creatinine Clearance: 45.6 mL/min (based on Cr of 1.19).   CMV prophylaxis: CrCl 40 to 59 mL/minute: Valcyte 450 mg once daily Donor (+), Recipient (+), treat 3 months post tx  PCP prophylaxis: Bactrim SS daily  Thrush prophylaxis:Nystatin QID 6 months post tx. No sx of thrush.   PPI use: Pantoprazole 40mg qAM. No issues with heartburn.   Current supplements for electrolyte replacement: MVI at noon, Magnesium ox 400mg qNoon, Clacium/D once daily  Tx Coordinator: Duy Rivas MD: Dr. Christensen, Using Med Card: Yes, Lab Frequency:once weekly  Recent Infections:  none  Recent Hospitalizations: none, was seen at ED as she coughed up blood after biopsy.   Home BPs: 134/87, 129/93, 133/91, 128/92, 132/89, 127/80, 137/88.  Immunizations:  annual flu shot yearly, Pneumovaxx:  Due again in 2020, Prevnar: Completed; TDaP: Due    Hypertension: Current medications include Metoprolol 25mg BID.  Patient does self-monitor BP. 134/87, 129/93, 133/91, 128/92, 132/89, 127/80, 137/88.  Patient reports no current medication side effects.    DVT in arm and neck: Pt had DVT in arm and neck 3/7/18. Pt is taking Warfarin per INR. Holding due to biopsy, is using Enoxaparin currently. No issues with bleeds today.     Pain: Pt is taking APAP 1000mg BID. Pain is mostly on right side in the chest.     Coughing/SOB: Pt is taking Albuterol prn. Would like to go through inhaler today.  Cough and SOB worsened in the last few weeks. No fever mentioned.    Current labs include:  Today's Vitals: LMP 06/07/2014 (Exact Date)  BP Readings from Last 3 Encounters:   05/21/18 (!) 147/94   05/10/18 137/84   05/09/18 135/89     Lab Results   Component Value Date    A1C 5.5 03/01/2018   .  Lab Results   Component Value Date    CHOL 168 02/19/2018     Lab Results   Component Value Date    TRIG 191 03/04/2018     Lab Results   Component Value Date    HDL 48 02/19/2018     Lab Results   Component Value Date    LDL 97 02/19/2018       Liver Function Studies -   Recent Labs   Lab Test  03/24/18   0535   03/21/18   0529   03/03/18   0329   PROTTOTAL   --    --   5.6*   --   4.0*   ALBUMIN  2.2*   < >  2.5*   < >  2.4*   BILITOTAL   --    --    --    --   1.5*   ALKPHOS   --    --    --    --   27*   AST   --    --    --    --   34   ALT  29   < >  26   < >  29    < > = values in this interval not displayed.       No results found for: UCRR, MICROL, UMALCR    Last Basic Metabolic Panel:  Lab Results   Component Value Date     05/21/2018      Lab Results   Component Value Date    POTASSIUM 4.9 05/21/2018     Lab Results   Component Value Date    CHLORIDE 103 05/21/2018     Lab Results   Component Value Date    BUN 38 05/21/2018     Lab Results   Component Value Date    CR 1.19 05/21/2018      GFR Estimate   Date Value Ref Range Status   05/21/2018 47 (L) >60 mL/min/1.7m2 Final     Comment:     Non  GFR Calc   05/16/2018 54 (L) >60 mL/min/1.7m2 Final     Comment:     Non  GFR Calc   05/08/2018 58 (L) >60 mL/min/1.7m2 Final     Comment:     Non  GFR Calc       There is no immunization history on file for this patient.    ASSESSMENT:                             Current medications were reviewed today.     Medication Adherence: good, no issues identified    Lung Transplant:  Needs improvement. Valcyte likely to be D/C'd today as she has completed her 3 months of prophylaxis for D+/R+. Pt to talk to Dr. Christensen about this. If it is continued it will have to be reduced to 450mg due to kidney function.     Hypertension: Stable.     DVT in arm and neck: Stable.     Pain: Needs improvement. Pt instructed to increase APAP to 1000mg TID-QID. Pt to discuss with Dr. Christensen about this pain as well.      Coughing/SOB: Needs improvement. Discussed proper use and maintenance of her albuterol inhaler in length today.     PLAN:                            Pt to...  1. Discuss Valcyte D/C with Dr. Christensen today.  2. Increase APAP to 1000mg TID-QID    I spent 40 minutes with this patient today. All changes were made via verbal approval with txp team. A copy of the visit note was provided to the patient's txp team.    Will follow up in 9 months.    The patient was given a summary of these recommendations as an after visit summary.     Marcio Navarro, PharmD  [facility] Pharmacist    Phone: [phone]

## 2018-06-05 NOTE — PROGRESS NOTES
"Transplant Coordinator Note    Reason for visit: Post lung transplant follow up visit   Coordinator: Present   Caregiver:      Health concerns addressed today:  1. Pt reports that she has a \"terrible cough.\" She reports that she has had this for the past couple of weeks. She is coughing up clear white sputum. She feels like there is some tightness in her chest.   2. Pt reports that she is wheezing at night.   3. No SOB noted when she is laying flat.   4. Xray showed that she is full of gas. Pt denies bloating.   5. Swelling on the right le noted.   6. Creatinine is slightly worse today.   7. PFT's are done today.   8. Chest pains reported. This may be some nerve injury.   9. Pt to have a bronch tomorrow without biopsies and possible OR bronch and stent in the near future.       Activity: Pt reports that she is feeling SOB with activity.  reports that she is not doing as much exercise as a couple weeks ago.     Oxygen needs:     Pain management: pt is taking tylenol for pain.     Diabetic management:     Pt Education: pt and  educated on the plan. See the instructions below.     Health Maintenance: Up to date.     Labs, CXR, PFTs reviewed with patient  Medication record reviewed and reconciled  Questions and concerns addressed    Patient Instructions  1. Drink plenty of fluids every day. 60 ounces or more.   2. Bronch tomorrow.  3. Start gabapentin 100 mg twice a day for nerve pain. Let Evelyn know if this doesn't help with chest pains.    4. Start pentoxifylline twice a day for increased wound healing.   5. Stop the Valcyte. Keep the medication. You might need this again in the future.   6. Have a CMV level and DSA checked every 2 weeks.   7. Record your heart rate in the book.     You are scheduled for a bronchoscopy on 6/6/18 at 0800 at the [facility] on 1st floor. Arrive 1 hour early.     Instructions     1. Nothing to eat or drink 8 hours before " procedure.  2. Hold your morning medications. Bring them with you to take after the procedure.  3. Have a  available to take you home.     Next transplant clinic appointment: 3-4 weeks with CXR, labs and PFTs  Next lab draw: every 2 weeks.       AVS printed at time of check out

## 2018-06-06 ENCOUNTER — TELEPHONE (OUTPATIENT)
Dept: PULMONOLOGY | Facility: CLINIC | Age: 58
End: 2018-06-06

## 2018-06-06 ENCOUNTER — TELEPHONE (OUTPATIENT)
Dept: TRANSPLANT | Facility: CLINIC | Age: 58
End: 2018-06-06

## 2018-06-06 ENCOUNTER — ANTICOAGULATION THERAPY VISIT (OUTPATIENT)
Dept: ANTICOAGULATION | Facility: CLINIC | Age: 58
End: 2018-06-06

## 2018-06-06 ENCOUNTER — HOSPITAL ENCOUNTER (OUTPATIENT)
Facility: CLINIC | Age: 58
Discharge: HOME | End: 2018-06-06
Attending: INTERNAL MEDICINE | Admitting: INTERNAL MEDICINE
Payer: COMMERCIAL

## 2018-06-06 VITALS
RESPIRATION RATE: 35 BRPM | SYSTOLIC BLOOD PRESSURE: 143 MMHG | DIASTOLIC BLOOD PRESSURE: 89 MMHG | OXYGEN SATURATION: 94 %

## 2018-06-06 DIAGNOSIS — Z94.2 LUNG TRANSPLANT RECIPIENT (H): ICD-10-CM

## 2018-06-06 DIAGNOSIS — Z94.2 S/P LUNG TRANSPLANT (H): ICD-10-CM

## 2018-06-06 DIAGNOSIS — J98.09 BRONCHIAL STENOSIS: Primary | ICD-10-CM

## 2018-06-06 DIAGNOSIS — I82.622 ACUTE DEEP VEIN THROMBOSIS (DVT) OF LEFT UPPER EXTREMITY, UNSPECIFIED VEIN (H): ICD-10-CM

## 2018-06-06 DIAGNOSIS — K59.09 OTHER CONSTIPATION: ICD-10-CM

## 2018-06-06 LAB
BRONCHOSCOPY: NORMAL
CMV DNA SPEC NAA+PROBE-ACNC: NORMAL [IU]/ML
CMV DNA SPEC NAA+PROBE-LOG#: NORMAL {LOG_IU}/ML
PRA DONOR SPECIFIC ABY: NORMAL
SPECIMEN SOURCE: NORMAL

## 2018-06-06 PROCEDURE — 25000128 H RX IP 250 OP 636: Performed by: INTERNAL MEDICINE

## 2018-06-06 PROCEDURE — 25000125 ZZHC RX 250: Performed by: INTERNAL MEDICINE

## 2018-06-06 PROCEDURE — G0500 MOD SEDAT ENDO SERVICE >5YRS: HCPCS | Performed by: INTERNAL MEDICINE

## 2018-06-06 PROCEDURE — 31622 DX BRONCHOSCOPE/WASH: CPT | Performed by: INTERNAL MEDICINE

## 2018-06-06 RX ORDER — LIDOCAINE HYDROCHLORIDE 40 MG/ML
INJECTION, SOLUTION RETROBULBAR PRN
Status: DISCONTINUED | OUTPATIENT
Start: 2018-06-06 | End: 2018-06-06 | Stop reason: HOSPADM

## 2018-06-06 RX ORDER — TACROLIMUS 1 MG/1
CAPSULE ORAL
Qty: 300 CAPSULE | Refills: 11 | Status: SHIPPED | OUTPATIENT
Start: 2018-06-06 | End: 2018-06-14

## 2018-06-06 RX ORDER — FENTANYL CITRATE 50 UG/ML
INJECTION, SOLUTION INTRAMUSCULAR; INTRAVENOUS PRN
Status: DISCONTINUED | OUTPATIENT
Start: 2018-06-06 | End: 2018-06-06 | Stop reason: HOSPADM

## 2018-06-06 RX ORDER — ONDANSETRON 2 MG/ML
INJECTION INTRAMUSCULAR; INTRAVENOUS PRN
Status: DISCONTINUED | OUTPATIENT
Start: 2018-06-06 | End: 2018-06-06 | Stop reason: HOSPADM

## 2018-06-06 RX ORDER — ALBUTEROL SULFATE 0.83 MG/ML
SOLUTION RESPIRATORY (INHALATION) PRN
Status: DISCONTINUED | OUTPATIENT
Start: 2018-06-06 | End: 2018-06-06 | Stop reason: HOSPADM

## 2018-06-06 NOTE — OR NURSING
Pt continued to cough throughout procedure, BP and HR continue to be high throughout procedure. Pt comfortable despite coughing. Look only . No specimens.

## 2018-06-06 NOTE — MR AVS SNAPSHOT
Kecia Blue   6/6/2018   Anticoagulation Therapy Visit    Description:  57 year old female   Provider:  Joshua Davis, RN   Department:  [facility]           INR as of 6/6/2018     Today's INR No new INR was available at the time of this encounter.      Anticoagulation Summary as of 6/6/2018     INR goal 2.0-3.0   Today's INR No new INR was available at the time of this encounter.   Full warfarin instructions 6/6: Hold; 6/7: Hold; 6/8: Hold; 6/9: Hold; 6/10: Hold; 6/11: Hold; 6/12: Hold; Otherwise No maintenance plan   Next INR check 6/11/2018    Indications   Lung transplant recipient (H) [Z94.2]  Deep vein thrombosis (DVT) (H) [I82.409] [I82.409]         June 2018 Details    Sun Mon Tue Wed Thu Fri Sat          1               2                 3               4               5               6      Hold   See details      7      Hold         8      Hold         9      Hold           10      Hold         11            12               13               14               15               16                 17               18               19               20               21               22               23                 24               25               26               27               28               29               30                Date Details   06/06 This INR check       Date of next INR:  6/11/2018         How to take your warfarin dose     Hold Do not take your warfarin dose. See the Details table to the right for additional instructions.

## 2018-06-06 NOTE — PROGRESS NOTES
6/6/18 Spoke to patient, reviewed the following plan.  Received the following note from transplant to continue Anticoagulation therapy.  She will hold until after her bronchoscopy on 6/11. Sent instructions to patient via [employer].    6/6/18 Hold Warfarin Start Lovenox 60mg in PM  6/7/18 Hold Warfarin Lovenox 60mg AM and PM  6/8/18 Hold Warfarin Lovenox 60 AM and PM   6/9/18 Hold Warfarin Lovenox 60 AM and PM  6/10/17 Hold Warfarin and Lovenox  6/11/18 Day of procedure Kecia will check with provider about restarting Warfarin and Lovenox     NOTE from Mikayla Latham in Transplant Dept.:  6/6 We reviewed ultrasound with Dr. Christensen. Dr. Christensen wants patient to continue on coumadin. Patient had a bronch in endoscopy today and the plan is for an OR bronch on Monday. We want her to keep holding coumadin until the day after the OR bronch (6/12/18) and in the mean time, she should continue Lovenox. Lovenox needs to be held prior to OR bronch (same protocol as for endoscopy bronch). Thanks.       Joshua Davis, RN

## 2018-06-06 NOTE — TELEPHONE ENCOUNTER
Patient had bronch this morning that showed bronchial stenosis, so is scheduled for an OR bronch on Monday (OR will contact patient to arrange this).    Ultrasound reviewed with Dr. Christensen and he wants patient to continue coumadin. Plan is for coumadin to be restarted after the OR bronch and patient will be staying on Lovenox until then, coumadin clinic updated with plan.     Tacrolimus level 15.5 at 13.5 hours, on 6/5/18  Goal 10-15.   Current dose 5 mg in AM, 6 mg in PM    Dose changed to 5 mg in AM, 5 mg in PM   Recheck level in 7 days    Discussed with patient

## 2018-06-07 LAB
MPX SERIES: NONREACTIVE
WNV RNA SPEC QL NAA+PROBE: NONREACTIVE

## 2018-06-08 LAB
DONOR IDENTIFICATION: NORMAL
DQB7: 1305
DSA COMMENTS: NORMAL
DSA PRESENT: YES
DSA TEST METHOD: NORMAL
ORGAN: NORMAL
SA1 CELL: NORMAL
SA1 COMMENTS: NORMAL
SA1 HI RISK ABY: NORMAL
SA1 MOD RISK ABY: NORMAL
SA1 TEST METHOD: NORMAL
SA2 CELL: NORMAL
SA2 COMMENTS: NORMAL
SA2 HI RISK ABY UA: NORMAL
SA2 MOD RISK ABY: NORMAL
SA2 TEST METHOD: NORMAL

## 2018-06-11 ENCOUNTER — ANESTHESIA EVENT (OUTPATIENT)
Dept: SURGERY | Facility: CLINIC | Age: 58
End: 2018-06-11

## 2018-06-11 ENCOUNTER — ANESTHESIA (OUTPATIENT)
Dept: SURGERY | Facility: CLINIC | Age: 58
End: 2018-06-11

## 2018-06-11 ENCOUNTER — SURGERY (OUTPATIENT)
Age: 58
End: 2018-06-11

## 2018-06-11 ENCOUNTER — ANTICOAGULATION THERAPY VISIT (OUTPATIENT)
Dept: ANTICOAGULATION | Facility: CLINIC | Age: 58
End: 2018-06-11

## 2018-06-11 ENCOUNTER — HOSPITAL ENCOUNTER (OUTPATIENT)
Facility: CLINIC | Age: 58
Discharge: HOME | End: 2018-06-11
Attending: INTERNAL MEDICINE | Admitting: INTERNAL MEDICINE
Payer: COMMERCIAL

## 2018-06-11 VITALS
RESPIRATION RATE: 18 BRPM | HEIGHT: 64 IN | WEIGHT: 116.4 LBS | BODY MASS INDEX: 19.87 KG/M2 | SYSTOLIC BLOOD PRESSURE: 134 MMHG | TEMPERATURE: 98.9 F | OXYGEN SATURATION: 95 % | DIASTOLIC BLOOD PRESSURE: 86 MMHG

## 2018-06-11 DIAGNOSIS — I82.622 ACUTE DEEP VEIN THROMBOSIS (DVT) OF LEFT UPPER EXTREMITY, UNSPECIFIED VEIN (H): ICD-10-CM

## 2018-06-11 DIAGNOSIS — Z94.2 H/O LUNG TRANSPLANT (H): Primary | ICD-10-CM

## 2018-06-11 DIAGNOSIS — J98.09 BRONCHIAL STENOSIS, RIGHT: ICD-10-CM

## 2018-06-11 DIAGNOSIS — Z94.2 LUNG TRANSPLANT RECIPIENT (H): ICD-10-CM

## 2018-06-11 DIAGNOSIS — R07.89 ATYPICAL CHEST PAIN: ICD-10-CM

## 2018-06-11 LAB
APTT PPP: 26 SEC (ref 22–37)
COPATH REPORT: NORMAL
CREAT SERPL-MCNC: 0.94 MG/DL (ref 0.52–1.04)
GFR SERPL CREATININE-BSD FRML MDRD: 61 ML/MIN/1.7M2
GLUCOSE BLDC GLUCOMTR-MCNC: 155 MG/DL (ref 70–99)
GLUCOSE BLDC GLUCOMTR-MCNC: 59 MG/DL (ref 70–99)
GLUCOSE SERPL-MCNC: 101 MG/DL (ref 70–99)
HGB BLD-MCNC: 10.3 G/DL (ref 11.7–15.7)
INR PPP: 0.92 (ref 0.86–1.14)
LMWH PPP CHRO-ACNC: 0.2 IU/ML
POTASSIUM SERPL-SCNC: 3.9 MMOL/L (ref 3.4–5.3)

## 2018-06-11 PROCEDURE — 27210794 ZZH OR GENERAL SUPPLY STERILE: Performed by: INTERNAL MEDICINE

## 2018-06-11 PROCEDURE — 82565 ASSAY OF CREATININE: CPT | Performed by: INTERNAL MEDICINE

## 2018-06-11 PROCEDURE — 36000057 ZZH SURGERY LEVEL 3 1ST 30 MIN - UMMC: Performed by: INTERNAL MEDICINE

## 2018-06-11 PROCEDURE — 25000128 H RX IP 250 OP 636: Performed by: NURSE ANESTHETIST, CERTIFIED REGISTERED

## 2018-06-11 PROCEDURE — 71000027 ZZH RECOVERY PHASE 2 EACH 15 MINS: Performed by: INTERNAL MEDICINE

## 2018-06-11 PROCEDURE — 36000059 ZZH SURGERY LEVEL 3 EA 15 ADDTL MIN UMMC: Performed by: INTERNAL MEDICINE

## 2018-06-11 PROCEDURE — 87070 CULTURE OTHR SPECIMN AEROBIC: CPT | Performed by: INTERNAL MEDICINE

## 2018-06-11 PROCEDURE — 37000009 ZZH ANESTHESIA TECHNICAL FEE, EACH ADDTL 15 MIN: Performed by: INTERNAL MEDICINE

## 2018-06-11 PROCEDURE — 36415 COLL VENOUS BLD VENIPUNCTURE: CPT | Performed by: INTERNAL MEDICINE

## 2018-06-11 PROCEDURE — 88108 CYTOPATH CONCENTRATE TECH: CPT | Performed by: INTERNAL MEDICINE

## 2018-06-11 PROCEDURE — 71000014 ZZH RECOVERY PHASE 1 LEVEL 2 FIRST HR: Performed by: INTERNAL MEDICINE

## 2018-06-11 PROCEDURE — 40000171 ZZH STATISTIC PRE-PROCEDURE ASSESSMENT III: Performed by: INTERNAL MEDICINE

## 2018-06-11 PROCEDURE — 25800025 ZZH RX 258: Performed by: ANESTHESIOLOGY

## 2018-06-11 PROCEDURE — 84132 ASSAY OF SERUM POTASSIUM: CPT | Performed by: INTERNAL MEDICINE

## 2018-06-11 PROCEDURE — C1726 CATH, BAL DIL, NON-VASCULAR: HCPCS | Performed by: INTERNAL MEDICINE

## 2018-06-11 PROCEDURE — 85018 HEMOGLOBIN: CPT | Performed by: INTERNAL MEDICINE

## 2018-06-11 PROCEDURE — 85610 PROTHROMBIN TIME: CPT | Performed by: INTERNAL MEDICINE

## 2018-06-11 PROCEDURE — 82962 GLUCOSE BLOOD TEST: CPT

## 2018-06-11 PROCEDURE — 82947 ASSAY GLUCOSE BLOOD QUANT: CPT | Performed by: INTERNAL MEDICINE

## 2018-06-11 PROCEDURE — C9399 UNCLASSIFIED DRUGS OR BIOLOG: HCPCS | Performed by: NURSE ANESTHETIST, CERTIFIED REGISTERED

## 2018-06-11 PROCEDURE — 25000128 H RX IP 250 OP 636: Performed by: INTERNAL MEDICINE

## 2018-06-11 PROCEDURE — 25000125 ZZHC RX 250: Performed by: NURSE ANESTHETIST, CERTIFIED REGISTERED

## 2018-06-11 PROCEDURE — 85730 THROMBOPLASTIN TIME PARTIAL: CPT | Performed by: INTERNAL MEDICINE

## 2018-06-11 PROCEDURE — 25000125 ZZHC RX 250: Performed by: ANESTHESIOLOGY

## 2018-06-11 PROCEDURE — 88312 SPECIAL STAINS GROUP 1: CPT | Performed by: INTERNAL MEDICINE

## 2018-06-11 PROCEDURE — 85520 HEPARIN ASSAY: CPT | Performed by: INTERNAL MEDICINE

## 2018-06-11 PROCEDURE — 37000008 ZZH ANESTHESIA TECHNICAL FEE, 1ST 30 MIN: Performed by: INTERNAL MEDICINE

## 2018-06-11 RX ORDER — FENTANYL CITRATE 50 UG/ML
25-50 INJECTION, SOLUTION INTRAMUSCULAR; INTRAVENOUS
Status: CANCELLED | OUTPATIENT
Start: 2018-06-11

## 2018-06-11 RX ORDER — PROPOFOL 10 MG/ML
INJECTION, EMULSION INTRAVENOUS CONTINUOUS PRN
Status: DISCONTINUED | OUTPATIENT
Start: 2018-06-11 | End: 2018-06-11

## 2018-06-11 RX ORDER — FENTANYL CITRATE 50 UG/ML
25-50 INJECTION, SOLUTION INTRAMUSCULAR; INTRAVENOUS EVERY 5 MIN PRN
Status: DISCONTINUED | OUTPATIENT
Start: 2018-06-11 | End: 2018-06-11 | Stop reason: HOSPADM

## 2018-06-11 RX ORDER — SODIUM CHLORIDE, SODIUM LACTATE, POTASSIUM CHLORIDE, CALCIUM CHLORIDE 600; 310; 30; 20 MG/100ML; MG/100ML; MG/100ML; MG/100ML
INJECTION, SOLUTION INTRAVENOUS CONTINUOUS
Status: DISCONTINUED | OUTPATIENT
Start: 2018-06-11 | End: 2018-06-11 | Stop reason: HOSPADM

## 2018-06-11 RX ORDER — LIDOCAINE 40 MG/G
CREAM TOPICAL
Status: DISCONTINUED | OUTPATIENT
Start: 2018-06-11 | End: 2018-06-11 | Stop reason: HOSPADM

## 2018-06-11 RX ORDER — MEPERIDINE HYDROCHLORIDE 50 MG/ML
12.5 INJECTION INTRAMUSCULAR; INTRAVENOUS; SUBCUTANEOUS
Status: DISCONTINUED | OUTPATIENT
Start: 2018-06-11 | End: 2018-06-11 | Stop reason: HOSPADM

## 2018-06-11 RX ORDER — PENTOXIFYLLINE 400 MG/1
400 TABLET, EXTENDED RELEASE ORAL 2 TIMES DAILY
Qty: 20 TABLET | Refills: 0 | Status: SHIPPED | OUTPATIENT
Start: 2018-06-11 | End: 2018-06-21

## 2018-06-11 RX ORDER — HYDRALAZINE HYDROCHLORIDE 20 MG/ML
2.5-5 INJECTION INTRAMUSCULAR; INTRAVENOUS EVERY 10 MIN PRN
Status: DISCONTINUED | OUTPATIENT
Start: 2018-06-11 | End: 2018-06-11 | Stop reason: HOSPADM

## 2018-06-11 RX ORDER — HYDROMORPHONE HYDROCHLORIDE 1 MG/ML
.3-.5 INJECTION, SOLUTION INTRAMUSCULAR; INTRAVENOUS; SUBCUTANEOUS EVERY 10 MIN PRN
Status: DISCONTINUED | OUTPATIENT
Start: 2018-06-11 | End: 2018-06-11 | Stop reason: HOSPADM

## 2018-06-11 RX ORDER — DEXTROSE MONOHYDRATE 25 G/50ML
20 INJECTION, SOLUTION INTRAVENOUS ONCE
Status: COMPLETED | OUTPATIENT
Start: 2018-06-11 | End: 2018-06-11

## 2018-06-11 RX ORDER — IPRATROPIUM BROMIDE AND ALBUTEROL SULFATE 2.5; .5 MG/3ML; MG/3ML
3 SOLUTION RESPIRATORY (INHALATION) ONCE
Status: COMPLETED | OUTPATIENT
Start: 2018-06-11 | End: 2018-06-11

## 2018-06-11 RX ORDER — FENTANYL CITRATE 50 UG/ML
INJECTION, SOLUTION INTRAMUSCULAR; INTRAVENOUS PRN
Status: DISCONTINUED | OUTPATIENT
Start: 2018-06-11 | End: 2018-06-11

## 2018-06-11 RX ORDER — ONDANSETRON 2 MG/ML
4 INJECTION INTRAMUSCULAR; INTRAVENOUS EVERY 30 MIN PRN
Status: DISCONTINUED | OUTPATIENT
Start: 2018-06-11 | End: 2018-06-11 | Stop reason: HOSPADM

## 2018-06-11 RX ORDER — NALOXONE HYDROCHLORIDE 0.4 MG/ML
.1-.4 INJECTION, SOLUTION INTRAMUSCULAR; INTRAVENOUS; SUBCUTANEOUS
Status: DISCONTINUED | OUTPATIENT
Start: 2018-06-11 | End: 2018-06-11 | Stop reason: HOSPADM

## 2018-06-11 RX ORDER — PROPOFOL 10 MG/ML
INJECTION, EMULSION INTRAVENOUS PRN
Status: DISCONTINUED | OUTPATIENT
Start: 2018-06-11 | End: 2018-06-11

## 2018-06-11 RX ORDER — GABAPENTIN 100 MG/1
100 CAPSULE ORAL 2 TIMES DAILY
Qty: 20 CAPSULE | Refills: 0 | Status: SHIPPED | OUTPATIENT
Start: 2018-06-11 | End: 2018-07-09

## 2018-06-11 RX ORDER — DEXAMETHASONE SODIUM PHOSPHATE 4 MG/ML
4 INJECTION, SOLUTION INTRA-ARTICULAR; INTRALESIONAL; INTRAMUSCULAR; INTRAVENOUS; SOFT TISSUE EVERY 10 MIN PRN
Status: DISCONTINUED | OUTPATIENT
Start: 2018-06-11 | End: 2018-06-11 | Stop reason: HOSPADM

## 2018-06-11 RX ORDER — DEXAMETHASONE SODIUM PHOSPHATE 4 MG/ML
INJECTION, SOLUTION INTRA-ARTICULAR; INTRALESIONAL; INTRAMUSCULAR; INTRAVENOUS; SOFT TISSUE PRN
Status: DISCONTINUED | OUTPATIENT
Start: 2018-06-11 | End: 2018-06-11

## 2018-06-11 RX ORDER — ONDANSETRON 4 MG/1
4 TABLET, ORALLY DISINTEGRATING ORAL EVERY 30 MIN PRN
Status: DISCONTINUED | OUTPATIENT
Start: 2018-06-11 | End: 2018-06-11 | Stop reason: HOSPADM

## 2018-06-11 RX ORDER — ONDANSETRON 2 MG/ML
INJECTION INTRAMUSCULAR; INTRAVENOUS PRN
Status: DISCONTINUED | OUTPATIENT
Start: 2018-06-11 | End: 2018-06-11

## 2018-06-11 RX ADMIN — ROCURONIUM BROMIDE 45 MG: 10 INJECTION INTRAVENOUS at 12:26

## 2018-06-11 RX ADMIN — PROPOFOL 150 MCG/KG/MIN: 10 INJECTION, EMULSION INTRAVENOUS at 12:25

## 2018-06-11 RX ADMIN — ONDANSETRON 4 MG: 2 INJECTION INTRAMUSCULAR; INTRAVENOUS at 12:46

## 2018-06-11 RX ADMIN — PROPOFOL 120 MG: 10 INJECTION, EMULSION INTRAVENOUS at 12:24

## 2018-06-11 RX ADMIN — FENTANYL CITRATE 100 MCG: 50 INJECTION, SOLUTION INTRAMUSCULAR; INTRAVENOUS at 12:26

## 2018-06-11 RX ADMIN — ROCURONIUM BROMIDE 10 MG: 10 INJECTION INTRAVENOUS at 12:35

## 2018-06-11 RX ADMIN — SODIUM CHLORIDE, POTASSIUM CHLORIDE, SODIUM LACTATE AND CALCIUM CHLORIDE: 600; 310; 30; 20 INJECTION, SOLUTION INTRAVENOUS at 12:11

## 2018-06-11 RX ADMIN — IPRATROPIUM BROMIDE AND ALBUTEROL SULFATE 3 ML: .5; 3 SOLUTION RESPIRATORY (INHALATION) at 13:10

## 2018-06-11 RX ADMIN — SUGAMMADEX 200 MG: 100 INJECTION, SOLUTION INTRAVENOUS at 12:50

## 2018-06-11 RX ADMIN — DEXTROSE MONOHYDRATE 20 ML: 500 INJECTION PARENTERAL at 09:36

## 2018-06-11 RX ADMIN — DEXAMETHASONE SODIUM PHOSPHATE 4 MG: 4 INJECTION, SOLUTION INTRA-ARTICULAR; INTRALESIONAL; INTRAMUSCULAR; INTRAVENOUS; SOFT TISSUE at 12:39

## 2018-06-11 RX ADMIN — MIDAZOLAM 2 MG: 1 INJECTION INTRAMUSCULAR; INTRAVENOUS at 12:11

## 2018-06-11 NOTE — IP AVS SNAPSHOT
MRN:[id number]                      After Visit Summary   6/11/2018    Kecia Blue    MRN: [id number]           Thank you!     Thank you for choosing [facility] for your care. Our goal is always to provide you with excellent care. Hearing back from our patients is one way we can continue to improve our services. Please take a few minutes to complete the written survey that you may receive in the mail after you visit with us. Thank you!        Patient Information     Date Of Birth          1960        About your hospital stay     You were admitted on:  June 11, 2018 You last received care in the:  Same Day Surgery [facility] [place]    You were discharged on:  June 11, 2018       Who to Call     For medical emergencies, please call 911.  For non-urgent questions about your medical care, please call your primary care provider or clinic, [phone]  For questions related to your surgery, please call your surgery clinic        Attending Provider     Provider Specialty    Delia, Wilber Warner MD Pulmonary       Primary Care Provider Office Phone # Fax #    Rosy Vu [phone]      Your next 10 appointments already scheduled     Jul 09, 2018  9:00 AM CDT   Lab with  LAB    Health Lab ([facility] [place])    [address] [place] [id number] [phone]            Jul 09, 2018  9:45 AM CDT   XR CHEST 2 VIEWS with XR1   [facility] Xray ([facility])    [address] [place] [address] [phone]           Please bring a list of your current medicines to your exam. (Include vitamins, minerals and over-thecounter medicines.) Leave your valuables at home.  Tell your doctor if there is a chance you may be pregnant.  You do not need to do anything special for this exam.            Jul 09, 2018 10:00 AM CDT   PFT VISIT with  PFL A    Health Pulmonary  Function Testing ([facility])    [address]  [address] [facility] [place] [address]   [phone]            Jul 09, 2018 10:40 AM CDT   (Arrive by 10:25 AM)   Return Lung Transplant with Alex Hale MD   [facility] Transplant ([facility])    [address] [address]   [phone]              Further instructions from your care team       Post Bronchoscopy Patient Instructions:    June 11, 2018  Kecia Blue    Your procedure completed (bronchoscopy with airway dilation) without any immediate complications.  You may cough up scant amount of blood for the next 12 hours. If you have excessive cough with blood, chest pain, shortness of breath, please report to the closest emergency room.    You may experience low grade (less than 100.5 F) fever next 24 hours, if so you can take tylenol. If the fever persists more than 24 hours contact to our office or your primary care provider.    Our office (Thoracic/Pulmonary--[phone]) will arrange your next dilation in 3-4 weeks time.    Should you have any question, please do not hesitate to call our office.    DA Lin MD    [facility], [place]  Same-Day Surgery   Adult Discharge Orders & Instructions     For 24 hours after surgery    1. Get plenty of rest.  A responsible adult must stay with you for at least 24 hours after you leave the hospital.   2. Do not drive or use heavy equipment.  If you have weakness or tingling, don't drive or use heavy equipment until this feeling goes away.  3. Do not drink alcohol.  4. Avoid strenuous or risky activities.  Ask for help when climbing stairs.   5. You may feel lightheaded.  IF so, sit for a few minutes before standing.  Have someone help you get up.   6. If you have nausea (feel sick to your stomach): Drink only clear liquids such as apple juice, ginger ale, broth or 7-Up.  Rest  may also help.  Be sure to drink enough fluids.  Move to a regular diet as you feel able.  7. You may have a slight fever. Call the doctor if your fever is over 100 F (37.7 C) (taken under the tongue) or lasts longer than 24 hours.  8. You may have a dry mouth, a sore throat, muscle aches or trouble sleeping.  These should go away after 24 hours.  9. Do not make important or legal decisions.   Call your doctor for any of the followin.  Signs of infection (fever, growing tenderness at the surgery site, a large amount of drainage or bleeding, severe pain, foul-smelling drainage, redness, swelling).    2. It has been over 8 to 10 hours since surgery and you are still not able to urinate (pass water).    3.  Headache for over 24 hours.    4.  Numbness, tingling or weakness the day after surgery (if you had spinal anesthesia).  To contact a doctor, call Dr. Lin's clinic phone number [phone] or:        [phone] and ask for the resident on call for          Pulmonary (answered 24 hours a day)      Emergency Department:    [facility]: [phone]       (TTY for hearing impaired: [phone])            Additional Information     If you use hormonal birth control (such as the pill, patch, ring or implants): You'll need a second form of birth control for 7 days (condoms, a diaphragm or contraceptive foam). While in the hospital, you received a medicine called Bridion. Your normal birth control will not work as well for a week after taking this medicine.          Pending Results     Date and Time Order Name Status Description    2018 1238 Cytology non gyn In process     2018 1238 Bronchial Culture Aerobic Bacterial In process             Admission Information     Date & Time Provider Department Dept. Phone    2018 Wilber Lin MD Same Day Surgery [facility] [place] [phone]      Your Vitals Were     Blood Pressure Temperature Respirations Height Weight Last Period    134/85  "98.9  F (37.2  C) (Oral) 18 1.626 m (5' 4\") 52.8 kg (116 lb 6.5 oz) 06/07/2014 (Exact Date)    Pulse Oximetry BMI (Body Mass Index)                94% 19.98 kg/m2          [employer] Information     [employer] gives you secure access to your electronic health record. If you see a primary care provider, you can also send messages to your care team and make appointments. If you have questions, please call your primary care clinic.  If you do not have a primary care provider, please call [phone] and they will assist you.        Care EveryWhere ID     This is your Care EveryWhere ID. This could be used by other organizations to access your [facility] medical records  [id number]        Equal Access to Services     ALBAN VALENCIA : Sahara Lucas, yasmin sierra, lakshmi vaz, morro daily . So [facility] [phone].    ATENCIÓN: Si habla español, tiene a taylor disposición servicios gratuitos de asistencia lingüística. [facility] [phone].    We comply with applicable federal civil rights laws and [place] laws. We do not discriminate on the basis of race, color, national origin, age, disability, sex, sexual orientation, or gender identity.               Review of your medicines      CONTINUE these medicines which may have CHANGED, or have new prescriptions. If we are uncertain of the size of tablets/capsules you have at home, strength may be listed as something that might have changed.        Dose / Directions    calcium-vitamin D 600-400 MG-UNIT per tablet   Commonly known as:  CALTRATE   This may have changed:  when to take this   Used for:  S/P lung transplant (H)        Dose:  1 tablet   Take 1 tablet by mouth 2 times daily (with meals)   Quantity:  60 tablet   Refills:  11         CONTINUE these medicines which have NOT CHANGED        Dose / Directions    ACETAMINOPHEN PO        Dose:  1000 mg   Take 1,000 mg by mouth every 6 hours as needed for pain   Refills:  0    "    albuterol 108 (90 Base) MCG/ACT Inhaler   Commonly known as:  PROAIR HFA/PROVENTIL HFA/VENTOLIN HFA   Used for:  SOB (shortness of breath), Wheezing        Dose:  2 puff   Inhale 2 puffs into the lungs every 6 hours as needed for shortness of breath / dyspnea or wheezing   Quantity:  1 Inhaler   Refills:  1       enoxaparin 60 MG/0.6ML injection   Commonly known as:  LOVENOX   Used for:  Acute deep vein thrombosis (DVT) of left upper extremity, unspecified vein (H), Lung transplant recipient (H)        Inject 60 mg subcutaneously every 12 hours or as directed by Anticoagulation Clinic.   Quantity:  25 Syringe   Refills:  3       * gabapentin 100 MG capsule   Commonly known as:  NEURONTIN   Used for:  Atypical chest pain, Lung transplant recipient (H)        Dose:  100 mg   Take 1 capsule (100 mg) by mouth 2 times daily   Quantity:  60 capsule   Refills:  3       * gabapentin 100 MG capsule   Commonly known as:  NEURONTIN   Used for:  Atypical chest pain, Lung transplant recipient (H)        Dose:  100 mg   Take 1 capsule (100 mg) by mouth 2 times daily   Quantity:  14 capsule   Refills:  0       levalbuterol 45 MCG/ACT Inhaler   Commonly known as:  XOPENEX HFA   Used for:  S/P lung transplant (H), Other constipation        Dose:  2 puff   Inhale 2 puffs into the lungs every 6 hours as needed for shortness of breath / dyspnea or wheezing   Quantity:  1 Inhaler   Refills:  11       magnesium oxide 400 MG tablet   Commonly known as:  MAG-OX   Used for:  Hypomagnesemia        Dose:  400 mg   Take 1 tablet (400 mg) by mouth daily   Quantity:  30 tablet   Refills:  11       metoprolol tartrate 25 MG tablet   Commonly known as:  LOPRESSOR   Used for:  Paroxysmal atrial fibrillation (H)        Dose:  25 mg   Take 1 tablet (25 mg) by mouth 2 times daily   Quantity:  60 tablet   Refills:  11       multivitamin, therapeutic with minerals Tabs tablet   Used for:  Lung transplant recipient (H)        Dose:  1 tablet   Take 1  tablet by mouth daily   Quantity:  30 each   Refills:  11       mycophenolate 250 MG capsule   Commonly known as:  GENERIC EQUIVALENT   Used for:  S/P lung transplant (H)        Dose:  1500 mg   Take 6 capsules (1,500 mg) by mouth 2 times daily   Quantity:  360 capsule   Refills:  11       nystatin 375150 UNIT/ML suspension   Commonly known as:  MYCOSTATIN   Used for:  Lung transplant recipient (H)        Dose:  10 mL   Take 10 mLs (1,000,000 Units) by mouth 4 times daily   Quantity:  1200 mL   Refills:  5       order for DME   Used for:  ILD (interstitial lung disease) (H), Hypoxia        Equipment being ordered: Oxygen 5 liters at rest, 15 liters with exertion.  Needs portability.  Please provide 2 10-liter concentrators for in the home   Quantity:  1 Device   Refills:  prn       pantoprazole 40 MG EC tablet   Commonly known as:  PROTONIX   Used for:  Gastroesophageal reflux disease without esophagitis        Dose:  40 mg   Take 1 tablet (40 mg) by mouth every morning   Quantity:  30 tablet   Refills:  11       * pentoxifylline 400 MG CR tablet   Commonly known as:  TRENtal   Used for:  Lung transplant recipient (H)        Dose:  400 mg   Take 1 tablet (400 mg) by mouth 2 times daily   Quantity:  60 tablet   Refills:  3       * pentoxifylline 400 MG CR tablet   Commonly known as:  TRENtal   Used for:  Lung transplant recipient (H), Atypical chest pain        Dose:  400 mg   Take 1 tablet (400 mg) by mouth 2 times daily   Quantity:  14 tablet   Refills:  0       * predniSONE 2.5 MG tablet   Commonly known as:  DELTASONE   Used for:  Lung replaced by transplant (H)        Dose:  2.5 mg   Take 1 tablet (2.5 mg) by mouth daily Follow taper card   Quantity:  60 tablet   Refills:  1       * predniSONE 5 MG tablet   Commonly known as:  DELTASONE   Used for:  S/P lung transplant (H)        Follow taper card   Quantity:  300 tablet   Refills:  3       sulfamethoxazole-trimethoprim 400-80 MG per tablet   Commonly known as:   BACTRIM/SEPTRA   Indication:  Preventative Medication Therapy Used Around Surgery   Used for:  Organ transplant candidate, Lung disease, interstitial (H), Abnormal chest CT, Hypotension, unspecified hypotension type, Acute on chronic respiratory failure with hypoxia (H), ILD (interstitial lung disease) (H)        Dose:  1 tablet   1 tablet by Oral or NG Tube route daily   Quantity:  30 tablet   Refills:  11       tacrolimus 1 MG capsule   Commonly known as:  GENERIC EQUIVALENT   Used for:  S/P lung transplant (H), Other constipation        Take 5 mg in the AM and 5 mg in the PM   Quantity:  300 capsule   Refills:  11       warfarin 1 MG tablet   Commonly known as:  COUMADIN   Used for:  DVT (deep venous thrombosis) (H), Status post lung transplantation (H)        Take 4mg MWFSat and 6mg TuThSun, or as directed by the Medication Monitoring Clinic at the  of .   Quantity:  140 tablet   Refills:  3       * Notice:  This list has 6 medication(s) that are the same as other medications prescribed for you. Read the directions carefully, and ask your doctor or other care provider to review them with you.             Protect others around you: Learn how to safely use, store and throw away your medicines at www.disposemymeds.org.             Medication List: This is a list of all your medications and when to take them. Check marks below indicate your daily home schedule. Keep this list as a reference.      Medications           Morning Afternoon Evening Bedtime As Needed    ACETAMINOPHEN PO   Take 1,000 mg by mouth every 6 hours as needed for pain                                albuterol 108 (90 Base) MCG/ACT Inhaler   Commonly known as:  PROAIR HFA/PROVENTIL HFA/VENTOLIN HFA   Inhale 2 puffs into the lungs every 6 hours as needed for shortness of breath / dyspnea or wheezing                                calcium-vitamin D 600-400 MG-UNIT per tablet   Commonly known as:  CALTRATE   Take 1 tablet by mouth 2 times daily  (with meals)                                enoxaparin 60 MG/0.6ML injection   Commonly known as:  LOVENOX   Inject 60 mg subcutaneously every 12 hours or as directed by Anticoagulation Clinic.                                * gabapentin 100 MG capsule   Commonly known as:  NEURONTIN   Take 1 capsule (100 mg) by mouth 2 times daily                                * gabapentin 100 MG capsule   Commonly known as:  NEURONTIN   Take 1 capsule (100 mg) by mouth 2 times daily                                levalbuterol 45 MCG/ACT Inhaler   Commonly known as:  XOPENEX HFA   Inhale 2 puffs into the lungs every 6 hours as needed for shortness of breath / dyspnea or wheezing                                magnesium oxide 400 MG tablet   Commonly known as:  MAG-OX   Take 1 tablet (400 mg) by mouth daily                                metoprolol tartrate 25 MG tablet   Commonly known as:  LOPRESSOR   Take 1 tablet (25 mg) by mouth 2 times daily                                multivitamin, therapeutic with minerals Tabs tablet   Take 1 tablet by mouth daily                                mycophenolate 250 MG capsule   Commonly known as:  GENERIC EQUIVALENT   Take 6 capsules (1,500 mg) by mouth 2 times daily                                nystatin 848752 UNIT/ML suspension   Commonly known as:  MYCOSTATIN   Take 10 mLs (1,000,000 Units) by mouth 4 times daily                                order for DME   Equipment being ordered: Oxygen 5 liters at rest, 15 liters with exertion.  Needs portability.  Please provide 2 10-liter concentrators for in the home                                pantoprazole 40 MG EC tablet   Commonly known as:  PROTONIX   Take 1 tablet (40 mg) by mouth every morning                                * pentoxifylline 400 MG CR tablet   Commonly known as:  TRENtal   Take 1 tablet (400 mg) by mouth 2 times daily                                * pentoxifylline 400 MG CR tablet   Commonly known as:  TRENtal   Take  1 tablet (400 mg) by mouth 2 times daily                                * predniSONE 2.5 MG tablet   Commonly known as:  DELTASONE   Take 1 tablet (2.5 mg) by mouth daily Follow taper card                                * predniSONE 5 MG tablet   Commonly known as:  DELTASONE   Follow taper card                                sulfamethoxazole-trimethoprim 400-80 MG per tablet   Commonly known as:  BACTRIM/SEPTRA   1 tablet by Oral or NG Tube route daily                                tacrolimus 1 MG capsule   Commonly known as:  GENERIC EQUIVALENT   Take 5 mg in the AM and 5 mg in the PM                                warfarin 1 MG tablet   Commonly known as:  COUMADIN   Take 4mg MWFSat and 6mg TuThSun, or as directed by the Medication Monitoring Clinic at the  of .                                * Notice:  This list has 6 medication(s) that are the same as other medications prescribed for you. Read the directions carefully, and ask your doctor or other care provider to review them with you.

## 2018-06-11 NOTE — ANESTHESIA PREPROCEDURE EVALUATION
Anesthesia Evaluation     . Pt has had prior anesthetic.     History of anesthetic complications   - PONV        ROS/MED HX    ENT/Pulmonary: Comment: ILD s/p B lung txp      Neurologic:       Cardiovascular: Comment: S/p several ECMO cannulations and decannulations    TTE 3/2018:  LVEF 5-70%  RVnl    Hx of acute LV failure, but now with preserved EF    (+) hypertension----. : . . . :. . pulmonary hypertension (2/2 ILD (now s/p B lung txp)),       METS/Exercise Tolerance:     Hematologic:     (+) History of blood clots (IJ blood clot on coumadin, transitioned to lovenox for procedure today) -      Musculoskeletal:         GI/Hepatic:         Renal/Genitourinary:         Endo:         Psychiatric:         Infectious Disease:         Malignancy:         Other: Comment: Rheumatoid arthritis; Arron's                    Physical Exam  Normal systems: dental    Airway   Mallampati: II  TM distance: >3 FB  Neck ROM: full    Dental     Cardiovascular   Rhythm and rate: regular and normal  (-) no weak pulses and no murmur    Pulmonary    breath sounds clear to auscultation(-) no rhonchi                    Anesthesia Plan      History & Physical Review      ASA Status:  3 .    NPO Status:  > 8 hours    Plan for General and ETT with Intravenous induction. Maintenance will be Balanced.      GETA. PIV x1. Standard ASA monitors. IV opioids. PACU postop    Risks and benefits of anesthetic discussed with patient including sore throat, voice hoarseness, injury to vocal cords, throat, mouth, teeth, tongue, and lips from intubation; nausea/vomiting; cardiac arrest, respiratory complications, MI, stroke, blood clots, death.    Presented opportunity to answer patient and family questions. Questions addressed.        Postoperative Care      Consents  Anesthetic plan, risks, benefits and alternatives discussed with:  Patient..                          .

## 2018-06-11 NOTE — DISCHARGE INSTRUCTIONS
Post Bronchoscopy Patient Instructions:    2018  Kecia Pintovickiedavid    Your procedure completed (bronchoscopy with airway dilation) without any immediate complications.  You may cough up scant amount of blood for the next 12 hours. If you have excessive cough with blood, chest pain, shortness of breath, please report to the closest emergency room.    You may experience low grade (less than 100.5 F) fever next 24 hours, if so you can take tylenol. If the fever persists more than 24 hours contact to our office or your primary care provider.    Our office (Thoracic/Pulmonary--[phone]) will arrange your next dilation in 3-4 weeks time.    Should you have any question, please do not hesitate to call our office.    DA Lin MD    [facility]  Same-Day Surgery   Adult Discharge Orders & Instructions     For 24 hours after surgery    1. Get plenty of rest.  A responsible adult must stay with you for at least 24 hours after you leave the hospital.   2. Do not drive or use heavy equipment.  If you have weakness or tingling, don't drive or use heavy equipment until this feeling goes away.  3. Do not drink alcohol.  4. Avoid strenuous or risky activities.  Ask for help when climbing stairs.   5. You may feel lightheaded.  IF so, sit for a few minutes before standing.  Have someone help you get up.   6. If you have nausea (feel sick to your stomach): Drink only clear liquids such as apple juice, ginger ale, broth or 7-Up.  Rest may also help.  Be sure to drink enough fluids.  Move to a regular diet as you feel able.  7. You may have a slight fever. Call the doctor if your fever is over 100 F (37.7 C) (taken under the tongue) or lasts longer than 24 hours.  8. You may have a dry mouth, a sore throat, muscle aches or trouble sleeping.  These should go away after 24 hours.  9. Do not make important or legal decisions.   Call your doctor for any of the followin.  Signs  of infection (fever, growing tenderness at the surgery site, a large amount of drainage or bleeding, severe pain, foul-smelling drainage, redness, swelling).    2. It has been over 8 to 10 hours since surgery and you are still not able to urinate (pass water).    3.  Headache for over 24 hours.    4.  Numbness, tingling or weakness the day after surgery (if you had spinal anesthesia).  To contact a doctor, call Dr. Lin's clinic phone number [phone] or:        [phone] and ask for the resident on call for          Pulmonary (answered 24 hours a day)      Emergency Department:    [facility]: [phone]       (TTY for hearing impaired: [phone])

## 2018-06-11 NOTE — PROGRESS NOTES
LM for patient to call about when she was instructed to restart her coumadin and lovenox after the bronchoscopy today.

## 2018-06-11 NOTE — PROCEDURES
Procedure(s):    Bronchoscopy  Balloon Dilation (1 sites)  Bronchial wash and suctioning of secretions (1 sites)    Indication:  S/p bilateral lung transplant c/b right anastomosis stenosis    Attending of Record:     DA Lin MD    Medications:    General Anesthesia - See anesthesia flowsheet for details    Sedation Time:  Per Anesthesia Care Provider    Time Out:  Performed    The patient's medical record has been reviewed.  The indication for the procedure was reviewed.  The necessary history and physical examination was performed and reviewed.  The risks, benefits and alternatives of the procedure were discussed with the the patient in detail and she had the opportunity to ask questions.  I discussed in particular the potential complications including risks of minor or life-threatening bleeding and/or infection, respiratory failure, vocal cord trauma / paralysis, pneumothorax, and discomfort. Sedation risks were also discussed including abnormal heart rhythms, low blood pressure, and respiratory failure. All questions were answered to the best of my ability.  Verbal and written informed consent was obtained.  The proposed procedure and the patient's identification were verified prior to the procedure by the physician and the nurse.    The patient was assessed for the adequacy for the procedure and to receive medications.   Mental Status:  Alert and oriented x 3  Airway examination:  Class I (Complete visualization of soft palate)  Pulmonary:  Clear to ausculation bilaterally  CV:  RRR, no murmurs or gallops  ASA Grade:  (II)  Mild systemic disease    After clinical evaluation and reviewing the indication, risks, alternatives and benefits of the procedure the patient was deemed to be in satisfactory condition to undergo the procedure.      A Tuberculosis risk assessment was performed:  The patient has no known RISK of Tuberculosis    The procedure was performed in a negative airflow room: No. The  reason the patient could not be moved to a negative airflow room was no TB risk  Maneuvers / Procedure:     The bronchoscope was inserted through the mouth via ETT.      Airway Examination:  A complete airway examination was performed from the distal trachea to the subsegmental level in each lobe of both lungs.  Pertinent findings include right mainstem anastomosis was narrowed, estimated lumen was ~ 3 mm. Mucosal slough was seen. L anastomosis patent. Exam done in both both to the subsegmental level and there was no stenosis. RML take off was small.          Balloon Dilation: By using Elation balloons, the right main stem was dilated up to 10 mm starting from 8 mm. At each mm, the balloon kept inflated 45-60 sec while keeping the patient on breath hold.    Bronchial wash and suctioning of creamy secretions: There was significant amount of creamy secretions in the right lung especially after dilation was done. I suctioned the secretions with 4.1 mm scope  and an therapeutic bronchoscope.    Main gee      R main anastomosis      L anastomosis          Any disposable equipment was visually inspected and deemed to be intact immediately post procedure.      Recommendations:     -->  Repeat dilation in 3-4 weeks  -->  Await for cultures

## 2018-06-11 NOTE — OR NURSING
Notified Dr. Freeman of pt's recheck BS, also let him know  said her BS are always really low but inaccurate from finger sticks as pt has raynauds. No further orders received.

## 2018-06-11 NOTE — IP AVS SNAPSHOT
Same Day Surgery [address] [address]    Phone:  [phone]                                       After Visit Summary   6/11/2018    Kecia Blue    MRN: [id number]           After Visit Summary Signature Page     I have received my discharge instructions, and my questions have been answered. I have discussed any challenges I see with this plan with the nurse or doctor.    ..........................................................................................................................................  Patient/Patient Representative Signature      ..........................................................................................................................................  Patient Representative Print Name and Relationship to Patient    ..................................................               ................................................  Date                                            Time    ..........................................................................................................................................  Reviewed by Signature/Title    ...................................................              ..............................................  Date                                                            Time

## 2018-06-11 NOTE — ANESTHESIA POSTPROCEDURE EVALUATION
Patient: Kecia Blue    Procedure(s):  Flexible Bronchoscopy, Balloon Dilation, Bronchial Washings - Wound Class: II-Clean Contaminated    Diagnosis:Bronchial Stenosis       Diagnosis Additional Information: No value filed.    Anesthesia Type:  No value filed.    Note:  Anesthesia Post Evaluation    Patient location during evaluation: PACU  Patient participation: Able to fully participate in evaluation  Level of consciousness: awake and alert  Pain management: adequate  Airway patency: patent  Cardiovascular status: acceptable  Respiratory status: acceptable  Hydration status: acceptable  PONV: none     Anesthetic complications: None          Last vitals:  Vitals:    06/11/18 1330 06/11/18 1345 06/11/18 1400   BP: 137/70 130/83 134/79   Resp: 19 24 22   Temp: 36.8  C (98.3  F) 37.5  C (99.5  F) 36.9  C (98.4  F)   SpO2: 98% 95% 95%         Electronically Signed By: Farhad Freeman MD  June 11, 2018  2:13 PM

## 2018-06-11 NOTE — MR AVS SNAPSHOT
Kecia Blue   6/11/2018   Anticoagulation Therapy Visit    Description:  57 year old female   Provider:  Rufina Hanna, RN   Department:  [facility]           INR as of 6/11/2018     Today's INR No new INR was available at the time of this encounter.      Anticoagulation Summary as of 6/11/2018     INR goal 2.0-3.0   Today's INR No new INR was available at the time of this encounter.   Full warfarin instructions 6/11: Hold; 6/12: Hold; Otherwise No maintenance plan   Next INR check     Indications   Lung transplant recipient (H) [Z94.2]  Deep vein thrombosis (DVT) (H) [I82.409] [I82.409]         June 2018 Details    Sun Mon Tue Wed Thu Fri Sat          1               2                 3               4               5               6               7               8               9                 10               11      Hold   See details      12      Hold         13               14               15               16                 17               18               19               20               21               22               23                 24               25               26               27               28               29               30                Date Details   06/11 This INR check      Date of next INR: No date specified         How to take your warfarin dose     Hold Do not take your warfarin dose. See the Details table to the right for additional instructions.

## 2018-06-11 NOTE — BRIEF OP NOTE
[facility] [place]    Brief Operative Note    Pre-operative diagnosis: Bronchial Stenosis       Post-operative diagnosis * No post-op diagnosis entered *  Procedure: Procedure(s):  Flexible Bronchoscopy, Balloon Dilation, Bronchial Washings - Wound Class: II-Clean Contaminated  Surgeon: Surgeon(s) and Role:     * Wilber Lin MD - Primary  Anesthesia: General   Estimated blood loss: None  Drains: None  Specimens:   ID Type Source Tests Collected by Time Destination   1 : Bronchial Washings Bronchial washing Bronchial BRONCHIAL CULTURE AEROBIC BACTERIAL, CYTOLOGY NON GYN Wilber Lin MD 6/11/2018 12:33 PM      Findings:   None.  Complications: None.  Implants: None.

## 2018-06-11 NOTE — ANESTHESIA CARE TRANSFER NOTE
Patient: Kecia Blue    Procedure(s):  Flexible Bronchoscopy, Balloon Dilation, Bronchial Washings - Wound Class: II-Clean Contaminated    Diagnosis: Bronchial Stenosis       Diagnosis Additional Information: No value filed.    Anesthesia Type:   No value filed.     Note:  Airway :Face Mask  Patient transferred to:PACU  Comments: Anesthesia Care Transfer Note    Patient: Kecia Blue    Transferred to: PACU    Patient vital signs: stable    Airway: none    Monitors on, VSS, pt. Stable, Report given to PACU RN.     Solo Marroquin CRNA  6/11/2018 1:03 PM      Handoff Report: Identifed the Patient, Identified the Reponsible Provider, Reviewed the pertinent medical history, Discussed the surgical course, Reviewed Intra-OP anesthesia mangement and issues during anesthesia, Set expectations for post-procedure period and Allowed opportunity for questions and acknowledgement of understanding      Vitals: (Last set prior to Anesthesia Care Transfer)    CRNA VITALS  6/11/2018 1227 - 6/11/2018 1303      6/11/2018             Pulse: 118    SpO2: 96 %    Resp Rate (observed): (!)  4                Electronically Signed By: ADRIÁN Dick CRNA  June 11, 2018  1:03 PM

## 2018-06-12 ENCOUNTER — ANTICOAGULATION THERAPY VISIT (OUTPATIENT)
Dept: ANTICOAGULATION | Facility: CLINIC | Age: 58
End: 2018-06-12

## 2018-06-12 DIAGNOSIS — I82.622 ACUTE DEEP VEIN THROMBOSIS (DVT) OF LEFT UPPER EXTREMITY, UNSPECIFIED VEIN (H): ICD-10-CM

## 2018-06-12 DIAGNOSIS — Z94.2 LUNG TRANSPLANT RECIPIENT (H): ICD-10-CM

## 2018-06-12 NOTE — PROGRESS NOTES
Pt had a bronchoscopy on 6/11 with no biopsies done-just a washout and dilation.  Pt will restart Lovenox this AM, and restart coumadin 4mg daily tonight.  She said that it usually takes her a while to get back into her goal range, so will have an INR done on 6/19.  Pt has enough Lovenox to last til then, and she does not mind having the shots.    ANTICOAGULATION FOLLOW-UP CLINIC VISIT    Patient Name:  Kecia Blue  Date:  6/12/2018  Contact Type:  Telephone    SUBJECTIVE:        OBJECTIVE    INR   Date Value Ref Range Status   06/11/2018 0.92 0.86 - 1.14 Final       ASSESSMENT / PLAN  INR assessment SUB    Recheck INR In: 1 WEEK    INR Location Clinic      Anticoagulation Summary as of 6/12/2018     INR goal 2.0-3.0   Today's INR 0.92! (6/11/2018)   Warfarin maintenance plan No maintenance plan   Full warfarin instructions 6/12: Hold; 6/13: 4 mg; 6/14: 4 mg; 6/15: 4 mg; 6/16: 4 mg; 6/17: 4 mg; 6/18: 4 mg; Otherwise No maintenance plan   Next INR check 6/19/2018   Priority INR   Target end date     Indications   Lung transplant recipient (H) [Z94.2]  Deep vein thrombosis (DVT) (H) [I82.409] [I82.409]         Anticoagulation Episode Summary     INR check location Clinic Lab    Preferred lab     Send INR reminders to [facility]    Comments Plan for 3 months of therapy- Provoked DVTHIPPA OK and  Verbal OK to speak with spouse and leave msg @ [phone].  labs @ [facility] starting ~ 5/25/18.  fax: [phone]. ph:[phone]      Anticoagulation Care Providers     Provider Role Specialty Phone number    Ame Chow PA-C Responsible Pulmonary [phone]            See the Encounter Report to view Anticoagulation Flowsheet and Dosing Calendar (Go to Encounters tab in chart review, and find the Anticoagulation Therapy Visit)  Spoke with patient.    Rufina Hanna RN

## 2018-06-12 NOTE — MR AVS SNAPSHOT
Kecia Blue   6/12/2018   Anticoagulation Therapy Visit    Description:  57 year old female   Provider:  Rufina Hanna RN   Department:  [facility]           INR as of 6/12/2018     Today's INR 0.92! (6/11/2018)      Anticoagulation Summary as of 6/12/2018     INR goal 2.0-3.0   Today's INR 0.92! (6/11/2018)   Full warfarin instructions 6/12: Hold; 6/13: 4 mg; 6/14: 4 mg; 6/15: 4 mg; 6/16: 4 mg; 6/17: 4 mg; 6/18: 4 mg; Otherwise No maintenance plan   Next INR check 6/19/2018    Indications   Lung transplant recipient (H) [Z94.2]  Deep vein thrombosis (DVT) (H) [I82.409] [I82.409]         June 2018 Details    Sun Mon Tue Wed Thu Fri Sat          1               2                 3               4               5               6               7               8               9                 10               11               12      Hold   See details      13      4 mg         14      4 mg         15      4 mg         16      4 mg           17      4 mg         18      4 mg         19            20               21               22               23                 24               25               26               27               28               29               30                Date Details   06/12 This INR check       Date of next INR:  6/19/2018         How to take your warfarin dose     To take:  4 mg Take 4 of the 1 mg tablets.    Hold Do not take your warfarin dose. See the Details table to the right for additional instructions.

## 2018-06-13 LAB
BACTERIA SPEC CULT: NORMAL
SPECIMEN SOURCE: NORMAL

## 2018-06-14 DIAGNOSIS — Z94.2 LUNG TRANSPLANT RECIPIENT (H): ICD-10-CM

## 2018-06-14 DIAGNOSIS — Z94.2 S/P LUNG TRANSPLANT (H): ICD-10-CM

## 2018-06-14 DIAGNOSIS — K59.09 OTHER CONSTIPATION: ICD-10-CM

## 2018-06-14 DIAGNOSIS — I82.622 ACUTE DEEP VEIN THROMBOSIS (DVT) OF LEFT UPPER EXTREMITY, UNSPECIFIED VEIN (H): ICD-10-CM

## 2018-06-14 RX ORDER — TACROLIMUS 1 MG/1
CAPSULE ORAL
Qty: 300 CAPSULE | Refills: 11 | Status: SHIPPED | OUTPATIENT
Start: 2018-06-14 | End: 2018-06-21

## 2018-06-18 ENCOUNTER — TELEPHONE (OUTPATIENT)
Dept: PULMONOLOGY | Facility: CLINIC | Age: 58
End: 2018-06-18

## 2018-06-18 NOTE — TELEPHONE ENCOUNTER
Spoke with patient to schedule procedure with Dr. Alana Lin    Procedure was scheduled on 07/10 at 10:30am  Patient will have H&P with Pulmonary, [facility]  Patient is aware a / is needed day of surgery.   Surgery letter was sent via [employer], patient has my direct contact information for any further questions.

## 2018-06-19 ENCOUNTER — ANTICOAGULATION THERAPY VISIT (OUTPATIENT)
Dept: ANTICOAGULATION | Facility: CLINIC | Age: 58
End: 2018-06-19

## 2018-06-19 DIAGNOSIS — Z94.2 LUNG REPLACED BY TRANSPLANT (H): ICD-10-CM

## 2018-06-19 DIAGNOSIS — I82.622 ACUTE DEEP VEIN THROMBOSIS (DVT) OF LEFT UPPER EXTREMITY, UNSPECIFIED VEIN (H): ICD-10-CM

## 2018-06-19 DIAGNOSIS — Z94.2 LUNG TRANSPLANT RECIPIENT (H): ICD-10-CM

## 2018-06-19 LAB — INR PPP: 1.43

## 2018-06-19 PROCEDURE — 80197 ASSAY OF TACROLIMUS: CPT | Performed by: INTERNAL MEDICINE

## 2018-06-19 NOTE — MR AVS SNAPSHOT
Kecia Blue   6/19/2018   Anticoagulation Therapy Visit    Description:  57 year old female   Provider:  Ofe Redmond, RN   Department:  [facility]           INR as of 6/19/2018     Today's INR 1.43!      Anticoagulation Summary as of 6/19/2018     INR goal 2.0-3.0   Today's INR 1.43!   Full warfarin instructions 6/19: 6 mg; 6/20: 6 mg; 6/21: 6 mg; Otherwise No maintenance plan   Next INR check 6/22/2018    Indications   Lung transplant recipient (H) [Z94.2]  Deep vein thrombosis (DVT) (H) [I82.409] [I82.409]         June 2018 Details    Sun Mon Tue Wed Thu Fri Sat          1               2                 3               4               5               6               7               8               9                 10               11               12               13               14               15               16                 17               18               19      6 mg   See details      20      6 mg         21      6 mg         22            23                 24               25               26               27               28               29               30                Date Details   06/19 This INR check       Date of next INR:  6/22/2018         How to take your warfarin dose     To take:  6 mg Take 6 of the 1 mg tablets.

## 2018-06-19 NOTE — PROGRESS NOTES
ANTICOAGULATION FOLLOW-UP CLINIC VISIT    Patient Name:  Kecia Blue  Date:  6/19/2018  Contact Type:  Telephone    SUBJECTIVE:     Patient Findings     Positives No Problem Findings    Comments Pt continues with Bactrim. Instructions for pt to continue Lovenox 60mg Q 12 Hours.           OBJECTIVE    INR   Date Value Ref Range Status   06/19/2018 1.43  Final     Comment:     Outside Lab        ASSESSMENT / PLAN  INR assessment SUB    Recheck INR In: 3 DAYS    INR Location Outside lab      Anticoagulation Summary as of 6/19/2018     INR goal 2.0-3.0   Today's INR 1.43!   Warfarin maintenance plan No maintenance plan   Full warfarin instructions 6/19: 6 mg; 6/20: 6 mg; 6/21: 6 mg; Otherwise No maintenance plan   Next INR check 6/22/2018   Priority INR   Target end date     Indications   Lung transplant recipient (H) [Z94.2]  Deep vein thrombosis (DVT) (H) [I82.409] [I82.409]         Anticoagulation Episode Summary     INR check location Clinic Lab    Preferred lab     Send INR reminders to [facility]    Comments Plan for 3 months of therapy- Provoked DVTHIPPA OK and  Verbal OK to speak with spouse and leave msg @ [phone].  labs @ [facility] starting ~ 5/25/18.  fax: [phone]. ph:[phone]      Anticoagulation Care Providers     Provider Role Specialty Phone number    Ame Chow PA-C Responsible Pulmonary [phone]            See the Encounter Report to view Anticoagulation Flowsheet and Dosing Calendar (Go to Encounters tab in chart review, and find the Anticoagulation Therapy Visit)    Spoke with patient. Gave them their lab results and new warfarin recommendation.  No changes in health, medication, or diet. No missed doses, no falls. No signs or symptoms of bleed or clotting.     Pt reports she just got another box of Lovenox     Ofe Redmond RN

## 2018-06-20 LAB
TACROLIMUS BLD-MCNC: 9 UG/L (ref 5–15)
TME LAST DOSE: NORMAL H

## 2018-06-21 ENCOUNTER — TELEPHONE (OUTPATIENT)
Dept: TRANSPLANT | Facility: CLINIC | Age: 58
End: 2018-06-21

## 2018-06-21 DIAGNOSIS — Z94.2 S/P LUNG TRANSPLANT (H): ICD-10-CM

## 2018-06-21 DIAGNOSIS — K59.09 OTHER CONSTIPATION: ICD-10-CM

## 2018-06-21 RX ORDER — TACROLIMUS 1 MG/1
CAPSULE ORAL
Qty: 330 CAPSULE | Refills: 11 | Status: SHIPPED | OUTPATIENT
Start: 2018-06-21 | End: 2018-07-23

## 2018-06-21 NOTE — TELEPHONE ENCOUNTER
Tacrolimus level 9.0 at 12 hours, on 6-18-18  Goal 10-15.   Current dose 5 mg in AM, 5 mg in PM    Dose changed to  5 mg in AM, 6 mg in PM   Recheck level in 7-10 days    Discussed with patient   MyChart message sent

## 2018-06-22 ENCOUNTER — ANTICOAGULATION THERAPY VISIT (OUTPATIENT)
Dept: ANTICOAGULATION | Facility: CLINIC | Age: 58
End: 2018-06-22

## 2018-06-22 DIAGNOSIS — I82.622 ACUTE DEEP VEIN THROMBOSIS (DVT) OF LEFT UPPER EXTREMITY, UNSPECIFIED VEIN (H): ICD-10-CM

## 2018-06-22 DIAGNOSIS — Z94.2 LUNG TRANSPLANT RECIPIENT (H): ICD-10-CM

## 2018-06-22 LAB — INR PPP: 2.72

## 2018-06-22 NOTE — PROGRESS NOTES
ANTICOAGULATION FOLLOW-UP CLINIC VISIT    Patient Name:  Kecia Blue  Date:  6/22/2018  Contact Type:  Telephone    SUBJECTIVE:     Patient Findings     Positives No Problem Findings    Comments Instructions for pt to D/C Lovenox 60mg Q 12 Hours            OBJECTIVE    INR   Date Value Ref Range Status   06/22/2018 2.72  Final     Comment:     Outside Lab        ASSESSMENT / PLAN  INR assessment THER    Recheck INR In: 6 DAYS    INR Location Outside lab      Anticoagulation Summary as of 6/22/2018     INR goal 2.0-3.0   Today's INR 2.72   Warfarin maintenance plan No maintenance plan   Full warfarin instructions 6/22: 2 mg; 6/23: 4 mg; 6/24: 4 mg; 6/25: 4 mg; 6/26: 4 mg; 6/27: 4 mg; Otherwise No maintenance plan   Next INR check 6/28/2018   Priority INR   Target end date     Indications   Lung transplant recipient (H) [Z94.2]  Deep vein thrombosis (DVT) (H) [I82.409] [I82.409]         Anticoagulation Episode Summary     INR check location Clinic Lab    Preferred lab     Send INR reminders to [facility]    Comments Plan for 3 months of therapy- Provoked DVTHIPPA OK and  Verbal OK to speak with spouse and leave msg @ [phone].  labs @ [facility] starting ~ 5/25/18.  fax: [phone]. ph:[phone] BRONCH PLAN NOTE 6/6/18      Anticoagulation Care Providers     Provider Role Specialty Phone number    Ame Chow PA-C Responsible Pulmonary [phone]            See the Encounter Report to view Anticoagulation Flowsheet and Dosing Calendar (Go to Encounters tab in chart review, and find the Anticoagulation Therapy Visit)    Spoke with patient. Gave them their lab results and new warfarin recommendation.  No changes in health, medication, or diet. No missed doses, no falls. No signs or symptoms of bleed or clotting.     Ofe Redmond RN

## 2018-06-22 NOTE — MR AVS SNAPSHOT
Kecia Blue   6/22/2018   Anticoagulation Therapy Visit    Description:  57 year old female   Provider:  Ofe Redmond, RN   Department:  [facility]           INR as of 6/22/2018     Today's INR 2.72      Anticoagulation Summary as of 6/22/2018     INR goal 2.0-3.0   Today's INR 2.72   Full warfarin instructions 6/22: 2 mg; 6/23: 4 mg; 6/24: 4 mg; 6/25: 4 mg; 6/26: 4 mg; 6/27: 4 mg; Otherwise No maintenance plan   Next INR check 6/28/2018    Indications   Lung transplant recipient (H) [Z94.2]  Deep vein thrombosis (DVT) (H) [I82.409] [I82.409]         June 2018 Details    Sun Mon Tue Wed Thu Fri Sat          1               2                 3               4               5               6               7               8               9                 10               11               12               13               14               15               16                 17               18               19               20               21               22      2 mg   See details      23      4 mg           24      4 mg         25      4 mg         26      4 mg         27      4 mg         28            29               30                Date Details   06/22 This INR check       Date of next INR:  6/28/2018         How to take your warfarin dose     To take:  2 mg Take 2 of the 1 mg tablets.    To take:  4 mg Take 4 of the 1 mg tablets.

## 2018-06-28 ENCOUNTER — TELEPHONE (OUTPATIENT)
Dept: TRANSPLANT | Facility: CLINIC | Age: 58
End: 2018-06-28

## 2018-06-28 DIAGNOSIS — Z94.2 S/P LUNG TRANSPLANT (H): ICD-10-CM

## 2018-06-28 RX ORDER — MYCOPHENOLATE MOFETIL 250 MG/1
1500 CAPSULE ORAL 2 TIMES DAILY
Qty: 360 CAPSULE | Refills: 11 | Status: SHIPPED | OUTPATIENT
Start: 2018-06-28 | End: 2018-07-30

## 2018-06-28 NOTE — TELEPHONE ENCOUNTER
[facility] Prior Authorization Team   Phone: [phone]  Fax: [phone]    Prior Authorization Not Needed per Insurance    Medication: Tacrolimus 1mg - No PA Needed - refill too soon rejection  Insurance Company:    Expected CoPay:      Pharmacy Filling the Rx:    Pharmacy Notified:    Patient Notified:          Submitted a request via cmm, per the insurance, rejection is only stating refill too soon. Pharmacy may call the helpdesk if they need further assistance processing prescription (phone:[phone])

## 2018-06-28 NOTE — TELEPHONE ENCOUNTER
URGENT  Prior Authorization Retail Medication Request    Medication/Dose: Tacrolimus 1mg  ICD code (if different than what is on RX):  Z94.2  Previously Tried and Failed:    Rationale:      Insurance Name:  VB Rags-B vs D  Insurance ID:  [id number]      Pharmacy Information (if different than what is on RX)  Name:  [facility] [place]  Phone:  [phone]

## 2018-06-29 ENCOUNTER — ANTICOAGULATION THERAPY VISIT (OUTPATIENT)
Dept: ANTICOAGULATION | Facility: CLINIC | Age: 58
End: 2018-06-29

## 2018-06-29 DIAGNOSIS — Z94.2 LUNG TRANSPLANT RECIPIENT (H): ICD-10-CM

## 2018-06-29 DIAGNOSIS — Z94.2 LUNG REPLACED BY TRANSPLANT (H): ICD-10-CM

## 2018-06-29 DIAGNOSIS — I82.622 ACUTE DEEP VEIN THROMBOSIS (DVT) OF LEFT UPPER EXTREMITY, UNSPECIFIED VEIN (H): ICD-10-CM

## 2018-06-29 LAB — INR PPP: 2.27

## 2018-06-29 PROCEDURE — 80197 ASSAY OF TACROLIMUS: CPT | Performed by: INTERNAL MEDICINE

## 2018-06-29 NOTE — PROGRESS NOTES
ANTICOAGULATION FOLLOW-UP CLINIC VISIT    Patient Name:  Kecia Blue  Date:  6/29/2018  Contact Type:  Telephone    SUBJECTIVE:     Patient Findings     Positives No Problem Findings    Comments Pt informs writer she will have a bronch on 7/10  Following message sent to Dr. Christensen & MERRICK Chow:  Good Afternoon:  Your pt JS informs us she will have a bronch on 7/10.  The following bridging plan is recommended:  7/4 last day of coumadin  7/5 take lovenox 60 mg in the PM  7/6, 7/7 & 7/8:  Take lovenox 60 mg in the AM & the PM  7/9 no lovenox or coumadin  7/10 day of procedure  We will wait to hear instructions from you.  Thank you,  Judith Salazar RN  [facility]  [phone]           OBJECTIVE    INR   Date Value Ref Range Status   06/29/2018 2.27  Final       ASSESSMENT / PLAN  No question data found.  Anticoagulation Summary as of 6/29/2018     INR goal 2.0-3.0   Today's INR 2.27   Warfarin maintenance plan No maintenance plan   Full warfarin instructions 6/29: 5 mg; 6/30: 4 mg; 7/1: 4 mg; 7/2: 4 mg; 7/3: 4 mg; 7/4: 4 mg; Otherwise No maintenance plan   Next INR check 7/9/2018   Priority INR   [facility] end date     Indications   Lung transplant recipient (H) [Z94.2]  Deep vein thrombosis (DVT) (H) [I82.409] [I82.409]         Anticoagulation Episode Summary     INR check location Clinic Lab    Preferred lab     Send INR reminders to [facility]    Comments Plan for 3 months of therapy- Provoked DVTHIPPA OK and  Verbal OK to speak with spouse and leave msg @ [phone].  labs @ [facility] starting ~ 5/25/18.  fax: [phone]. ph:[phone] BRONCH PLAN NOTE 6/6/18      Anticoagulation Care Providers     Provider Role Specialty Phone number    Ame Chow PA-C Responsible Pulmonary [phone]            See the Encounter Report to view Anticoagulation Flowsheet and Dosing Calendar (Go to Encounters tab in chart review, and find the Anticoagulation Therapy  Visit)    Spoke with the pt & see above notation    Judith Salazar, RN        7/3/18 Addendum   Rios Hines [facility]  Spoke with Transplant Coord Magaly GIL. Gave her the plan for bridging with Lovenox for the Bronch on 7/10/18. She approved out recommendation as this is what Kecia has done before. I spoke with Kecia and she understands the directions and has enough Lovenox at home.

## 2018-06-29 NOTE — MR AVS SNAPSHOT
Kecia Blue   6/29/2018   Anticoagulation Therapy Visit    Description:  57 year old female   Provider:  Judith Salazar, RN   Department:  [facility]           INR as of 6/29/2018     Today's INR 2.27      Anticoagulation Summary as of 6/29/2018     INR goal 2.0-3.0   Today's INR 2.27   Full warfarin instructions 6/29: 5 mg; 6/30: 4 mg; 7/1: 4 mg; 7/2: 4 mg; 7/3: 4 mg; 7/4: 4 mg; Otherwise No maintenance plan   Next INR check 7/9/2018    Indications   Lung transplant recipient (H) [Z94.2]  Deep vein thrombosis (DVT) (H) [I82.409] [I82.409]         June 2018 Details    Sun Mon Tue Wed Thu Fri Sat          1               2                 3               4               5               6               7               8               9                 10               11               12               13               14               15               16                 17               18               19               20               21               22               23                 24               25               26               27               28               29      5 mg   See details      30      4 mg          Date Details   06/29 This INR check               How to take your warfarin dose     To take:  4 mg Take 4 of the 1 mg tablets.    To take:  5 mg Take 5 of the 1 mg tablets.           July 2018 Details    Sun Mon Tue Wed Thu Fri Sat     1      4 mg         2      4 mg         3      4 mg         4      4 mg         5               6               7                 8               9            10               11               12               13               14                 15               16               17               18               19               20               21                 22               23               24               25               26               27               28                 29               30               31                    Date  Details   No additional details    Date of next INR:  7/9/2018         How to take your warfarin dose     To take:  4 mg Take 4 of the 1 mg tablets.

## 2018-07-02 LAB
TACROLIMUS BLD-MCNC: 11.9 UG/L (ref 5–15)
TME LAST DOSE: NORMAL H

## 2018-07-03 NOTE — PROGRESS NOTES
Kecia, your prograf level is 11.9 at 12.5 hours and is within goal range.  No dose changes for now.  Call office with questions.  Magaly

## 2018-07-03 NOTE — MR AVS SNAPSHOT
Kecia Blue   4/18/2018   Anticoagulation Therapy Visit    Description:  57 year old female   Provider:  Anju Meyers, RN   Department:  [facility]           INR as of 4/18/2018     Today's INR 1.92!      Anticoagulation Summary as of 4/18/2018     INR goal 2.0-3.0   Today's INR 1.92!   Full instructions 4/18: 6 mg; 4/19: 4 mg; Otherwise No maintenance plan   Next INR check 4/20/2018    Indications   Lung transplant recipient (H) [Z94.2]  Deep vein thrombosis (DVT) (H) [I82.409] [I82.409]         April 2018 Details    Sun Mon Tue Wed Thu Fri Sat     1               2               3               4               5               6               7                 8               9               10               11               12               13               14                 15               16               17               18      6 mg   See details      19      4 mg         20            21                 22               23               24               25               26               27               28                 29               30                     Date Details   04/18 This INR check       Date of next INR:  4/20/2018         How to take your warfarin dose     To take:  4 mg Take 4 of the 1 mg tablets.    To take:  6 mg Take 6 of the 1 mg tablets.            none

## 2018-07-05 DIAGNOSIS — Z94.2 S/P LUNG TRANSPLANT (H): ICD-10-CM

## 2018-07-05 DIAGNOSIS — Z94.2 LUNG REPLACED BY TRANSPLANT (H): ICD-10-CM

## 2018-07-05 RX ORDER — PREDNISONE 5 MG/1
TABLET ORAL
Qty: 180 TABLET | Refills: 3 | Status: SHIPPED | OUTPATIENT
Start: 2018-07-05 | End: 2018-12-13

## 2018-07-05 RX ORDER — PREDNISONE 2.5 MG/1
2.5 TABLET ORAL DAILY
Qty: 90 TABLET | Refills: 1 | Status: SHIPPED | OUTPATIENT
Start: 2018-07-05 | End: 2018-12-13

## 2018-07-09 ENCOUNTER — RESULTS ONLY (OUTPATIENT)
Dept: OTHER | Facility: CLINIC | Age: 58
End: 2018-07-09

## 2018-07-09 ENCOUNTER — RADIANT APPOINTMENT (OUTPATIENT)
Dept: GENERAL RADIOLOGY | Facility: CLINIC | Age: 58
End: 2018-07-09
Payer: COMMERCIAL

## 2018-07-09 ENCOUNTER — ANESTHESIA EVENT (OUTPATIENT)
Dept: SURGERY | Facility: CLINIC | Age: 58
End: 2018-07-09

## 2018-07-09 ENCOUNTER — OFFICE VISIT (OUTPATIENT)
Dept: TRANSPLANT | Facility: CLINIC | Age: 58
End: 2018-07-09
Attending: INTERNAL MEDICINE
Payer: COMMERCIAL

## 2018-07-09 ENCOUNTER — ANTICOAGULATION THERAPY VISIT (OUTPATIENT)
Dept: ANTICOAGULATION | Facility: CLINIC | Age: 58
End: 2018-07-09

## 2018-07-09 VITALS
HEIGHT: 64 IN | BODY MASS INDEX: 20.43 KG/M2 | WEIGHT: 119.7 LBS | OXYGEN SATURATION: 97 % | DIASTOLIC BLOOD PRESSURE: 82 MMHG | HEART RATE: 107 BPM | SYSTOLIC BLOOD PRESSURE: 135 MMHG

## 2018-07-09 DIAGNOSIS — M25.562 ACUTE PAIN OF LEFT KNEE: ICD-10-CM

## 2018-07-09 DIAGNOSIS — Z94.2 LUNG TRANSPLANT RECIPIENT (H): ICD-10-CM

## 2018-07-09 DIAGNOSIS — I10 HYPERTENSION, UNSPECIFIED TYPE: ICD-10-CM

## 2018-07-09 DIAGNOSIS — J98.09 BRONCHIAL STENOSIS, RIGHT: ICD-10-CM

## 2018-07-09 DIAGNOSIS — Z79.899 ENCOUNTER FOR LONG-TERM (CURRENT) USE OF HIGH-RISK MEDICATION: ICD-10-CM

## 2018-07-09 DIAGNOSIS — I82.602 ARM VEIN BLOOD CLOT, LEFT: ICD-10-CM

## 2018-07-09 DIAGNOSIS — Z94.2 S/P LUNG TRANSPLANT (H): Primary | ICD-10-CM

## 2018-07-09 DIAGNOSIS — I82.622 ACUTE DEEP VEIN THROMBOSIS (DVT) OF LEFT UPPER EXTREMITY, UNSPECIFIED VEIN (H): ICD-10-CM

## 2018-07-09 DIAGNOSIS — R06.1 STRIDOR: ICD-10-CM

## 2018-07-09 DIAGNOSIS — Z94.2 LUNG TRANSPLANT RECIPIENT (H): Primary | ICD-10-CM

## 2018-07-09 LAB
ALBUMIN SERPL-MCNC: 2.8 G/DL (ref 3.4–5)
ALP SERPL-CCNC: 95 U/L (ref 40–150)
ALT SERPL W P-5'-P-CCNC: 23 U/L (ref 0–50)
ANION GAP SERPL CALCULATED.3IONS-SCNC: 8 MMOL/L (ref 3–14)
ANISOCYTOSIS BLD QL SMEAR: SLIGHT
AST SERPL W P-5'-P-CCNC: 16 U/L (ref 0–45)
BASOPHILS # BLD AUTO: 0 10E9/L (ref 0–0.2)
BASOPHILS NFR BLD AUTO: 0 %
BILIRUB SERPL-MCNC: 0.2 MG/DL (ref 0.2–1.3)
BUN SERPL-MCNC: 16 MG/DL (ref 7–30)
CALCIUM SERPL-MCNC: 9.2 MG/DL (ref 8.5–10.1)
CHLORIDE SERPL-SCNC: 101 MMOL/L (ref 94–109)
CO2 SERPL-SCNC: 28 MMOL/L (ref 20–32)
CREAT SERPL-MCNC: 1.01 MG/DL (ref 0.52–1.04)
DIFFERENTIAL METHOD BLD: ABNORMAL
EOSINOPHIL # BLD AUTO: 0.2 10E9/L (ref 0–0.7)
EOSINOPHIL NFR BLD AUTO: 0.9 %
ERYTHROCYTE [DISTWIDTH] IN BLOOD BY AUTOMATED COUNT: 13.2 % (ref 10–15)
GFR SERPL CREATININE-BSD FRML MDRD: 57 ML/MIN/1.7M2
GLUCOSE SERPL-MCNC: 166 MG/DL (ref 70–99)
HCT VFR BLD AUTO: 29.2 % (ref 35–47)
HGB BLD-MCNC: 8.7 G/DL (ref 11.7–15.7)
INR PPP: 1.13 (ref 0.86–1.14)
LYMPHOCYTES # BLD AUTO: 0.8 10E9/L (ref 0.8–5.3)
LYMPHOCYTES NFR BLD AUTO: 4.4 %
MCH RBC QN AUTO: 30.1 PG (ref 26.5–33)
MCHC RBC AUTO-ENTMCNC: 29.8 G/DL (ref 31.5–36.5)
MCV RBC AUTO: 101 FL (ref 78–100)
MONOCYTES # BLD AUTO: 1.1 10E9/L (ref 0–1.3)
MONOCYTES NFR BLD AUTO: 6.1 %
NEUTROPHILS # BLD AUTO: 15.9 10E9/L (ref 1.6–8.3)
NEUTROPHILS NFR BLD AUTO: 88.6 %
PLATELET # BLD AUTO: 360 10E9/L (ref 150–450)
PLATELET # BLD EST: ABNORMAL 10*3/UL
POIKILOCYTOSIS BLD QL SMEAR: SLIGHT
POTASSIUM SERPL-SCNC: 4.2 MMOL/L (ref 3.4–5.3)
PROT SERPL-MCNC: 7.2 G/DL (ref 6.8–8.8)
RBC # BLD AUTO: 2.89 10E12/L (ref 3.8–5.2)
SODIUM SERPL-SCNC: 137 MMOL/L (ref 133–144)
TACROLIMUS BLD-MCNC: 11.3 UG/L (ref 5–15)
TME LAST DOSE: 2115 H
WBC # BLD AUTO: 17.9 10E9/L (ref 4–11)

## 2018-07-09 PROCEDURE — G0463 HOSPITAL OUTPT CLINIC VISIT: HCPCS | Mod: ZF

## 2018-07-09 PROCEDURE — 80053 COMPREHEN METABOLIC PANEL: CPT | Performed by: INTERNAL MEDICINE

## 2018-07-09 PROCEDURE — 85025 COMPLETE CBC W/AUTO DIFF WBC: CPT | Performed by: INTERNAL MEDICINE

## 2018-07-09 PROCEDURE — 86833 HLA CLASS II HIGH DEFIN QUAL: CPT | Performed by: INTERNAL MEDICINE

## 2018-07-09 PROCEDURE — 85610 PROTHROMBIN TIME: CPT | Performed by: INTERNAL MEDICINE

## 2018-07-09 PROCEDURE — 86832 HLA CLASS I HIGH DEFIN QUAL: CPT | Performed by: INTERNAL MEDICINE

## 2018-07-09 PROCEDURE — 36415 COLL VENOUS BLD VENIPUNCTURE: CPT | Performed by: INTERNAL MEDICINE

## 2018-07-09 PROCEDURE — 80197 ASSAY OF TACROLIMUS: CPT | Performed by: INTERNAL MEDICINE

## 2018-07-09 NOTE — LETTER
"7/9/2018    RE: Kecia Blue  [address]  [facility] [facility] [address]         Pulmonary Medicine  Post - Lung Transplant Daily Clinic Note   July 9, 2018    Patient: Kecia Blue  MRN: [id number]  Transplant Date: 2/21/2018 (POD# 131)         Assessment and Plan:     Kecia Blue is a 57 year old female with a PMHx significant for dermatomyositis, seronegative rheumatoid arthritis, ILD, and pulmonary hypertension s/p bilateral sequential lung transplant on 2/21/2018, POD# 131.     Problem List:  1. S/p lung transplant   - continue with standard 3-drug immunosuppression with tac, pred, MMF and transplant ppx with Bactrim for PJP and Valcyte for CMV (Donor   - will f/u tac level (goal 10 - 15) and dose adjust accordingly  2. Right main bronchial stenosis s/p dilatation. Her stridor is likely due to re-stenosis of the RMB   - surveillance bronch for evaluation and treatment (possilble redilatation)  3. Left Upper extremity and Right IJ thrombus in the setting of ECMO catherization on coumadin anticoagulation with INR goal 2 - 3 for duration of 6 months   - currently on bridging with Lovenox in anticipation of bronchoscopy  4. Dermatomyositis with Raynaud's Phenomenon   - encourage f/u with Rheumotology  5. Hypertension well controlled on metoprolol 25 mg bid   - have discussed with patient about switching to a once a day formulation but she does not want to \"rock the boat\"  6. Dsyphonia, ENT referral closer to home will be arranged    - encourage daily physical activity as tolerated (recommended pool therapy if she has difficulty weight bearing on left knee)  - encourage f/u with PCP when returns to home with age- and gender-specific health care maintenance including yearly dermatologic examination    RTC in 4 weeks      Complexity indicators:     --immune compromised, on high-risk medications X   --organ transplant recipient X   --multiple organ transplant recipient     --active respiratory " "infection     --within one year of transplant; and/or within one month of hospitalization X   --chronic lung allograft dysfunction syndrome (CLAD, chronic rejection, or bronchiolitis obliterans syndrome)     --new medical problem addressed during this visit     --multiple active medical problems    --admitted directly to hospital from this clinic visit     -->50% of this visit was spent in counseling and care coordination. If yes, total visit time was         Alex Hale MD, MAC[facility]   of Medicine  Pulmonary, Critical Care and Sleep Medicine  [facility]  Pager: [phone]            Subjective & Interval History:     Last 24 hours of care team notes reviewed.    Patient presents today for preoperative visits. Her  was also in attendance. The patient is scheduled for surveillance bronchoscopy with possible bronchial dilatation for bronchial stenosis. She presents today with complaint of auditory stridor worsening when lying flat. She denies any change in her dyspnea or exercise tolerance. But admits she is not a physically active as before due an injury to her knee worsened by pulmonary rehab.  She denies fevers, chills, chronic cough, or other URI symptoms. She states she is tolerating her transplant medications okay. She reports that overall she is doing great. She reports good po fluid intake, \"I have an micki!\"           Review of Systems:      ROS: 10 point ROS neg other than the symptoms noted above in the HPI.          Medications:     No current facility-administered medications for this visit.      No current outpatient prescriptions on file.     Facility-Administered Medications Ordered in Other Visits   Medication     dexamethasone (DECADRON) injection     fentaNYL (PF) (SUBLIMAZE) injection 25-50 mcg     lactated ringers infusion     lidocaine injection 2% (MDV)     meperidine (DEMEROL) injection 12.5 mg     naloxone (NARCAN) injection 0.1-0.4 mg     ondansetron " "(ZOFRAN-ODT) ODT tab 4 mg    Or     ondansetron (ZOFRAN) injection 4 mg     ondansetron (ZOFRAN) injection     ORAL Pain Medications -  may administer as ordered by surgeon for take home use     PRE OP antibiotics NOT needed for this surgical procedure.     prochlorperazine (COMPAZINE) injection 10 mg     propofol (DIPRIVAN) infusion     propofol (DIPRIVAN) injection 10 mg/mL vial     rocuronium (ZEMURON) injection            Physical Exam:       GEN: Awake and alert, in no acute distress, sitting upright in chair. Pleasant and cooperative  HEENT: Pupils equal and reactive to light, conjunctiva are pink conjunctivae, sclera anicteric,  Oral mucosa moist without lesions.  NECK: supple no JVD/LAD  PULM: Good air flow bilaterally, symmetric in expansion, Scattered crackles to lower lobes. No rhonchi, no wheezes. Breathing non-labored .  CV: Normal S1 and S2. RRR.  No murmur, gallop, or rub.  No peripheral edema.  Peripheral pulses intact.   ABD: Soft, nontender, nondistended. Bowel sound normoactive, no guarding, no rebound.   MSK: .  No apparent muscle wasting. No clubbing, or edema (+) acrocyanosis of fingers and toes with poor capillary refill, left knee warm, swollen and tender to palpation but no signs of cellulitis  NEURO: Alert and oriented to person, place, and time, motor 5/5 upper/lower extremity b/l, sensation grossly intact to touch, gait normal  SKIN: Warm and dry. No visible rashes or lesions   PSYCH: Mood stable, judgment and insight appropriate.           Data:     All vital signs, laboratory and imaging data for the past 24 hours reviewed.      Vital signs:                    Height: 162.6 cm (5' 4\") Weight: 54.3 kg (119 lb 11.2 oz)    Weight trend:   Vitals:    07/09/18 1035   Weight: 54.3 kg (119 lb 11.2 oz)        Labs    Lab Results   Component Value Date    WBC 17.9 07/09/2018     Lab Results   Component Value Date    RBC 2.89 07/09/2018     Lab Results   Component Value Date    HGB 8.7 07/09/2018 "     Lab Results   Component Value Date    HCT 29.2 07/09/2018     No components found for: MCT  Lab Results   Component Value Date     07/09/2018     Lab Results   Component Value Date    MCH 30.1 07/09/2018     Lab Results   Component Value Date    MCHC 29.8 07/09/2018     Lab Results   Component Value Date    RDW 13.2 07/09/2018     Lab Results   Component Value Date     07/09/2018     Last Comprehensive Metabolic Panel:  Sodium   Date Value Ref Range Status   07/09/2018 137 133 - 144 mmol/L Final     Potassium   Date Value Ref Range Status   07/09/2018 4.2 3.4 - 5.3 mmol/L Final     Chloride   Date Value Ref Range Status   07/09/2018 101 94 - 109 mmol/L Final     Carbon Dioxide   Date Value Ref Range Status   07/09/2018 28 20 - 32 mmol/L Final     Anion Gap   Date Value Ref Range Status   07/09/2018 8 3 - 14 mmol/L Final     Glucose   Date Value Ref Range Status   07/09/2018 166 (H) 70 - 99 mg/dL Final     Urea Nitrogen   Date Value Ref Range Status   07/09/2018 16 7 - 30 mg/dL Final     Creatinine   Date Value Ref Range Status   07/09/2018 1.01 0.52 - 1.04 mg/dL Final     GFR Estimate   Date Value Ref Range Status   07/09/2018 57 (L) >60 mL/min/1.7m2 Final     Comment:     Non  GFR Calc     Calcium   Date Value Ref Range Status   07/09/2018 9.2 8.5 - 10.1 mg/dL Final     Bilirubin Total   Date Value Ref Range Status   07/09/2018 0.2 0.2 - 1.3 mg/dL Final     Alkaline Phosphatase   Date Value Ref Range Status   07/09/2018 95 40 - 150 U/L Final     ALT   Date Value Ref Range Status   07/09/2018 23 0 - 50 U/L Final     AST   Date Value Ref Range Status   07/09/2018 16 0 - 45 U/L Final         Lab Results   Component Value Date    FLUAH1 Negative 04/10/2018    FLUAH3 Negative 04/10/2018    YQ2847 Negative 04/10/2018    IFLUB Negative 04/10/2018    RSVA Negative 04/10/2018    RSVB Negative 04/10/2018    PIV1 Negative 04/10/2018    PIV2 Negative 04/10/2018    PIV3 Negative 04/10/2018     HMPV Negative 04/10/2018    HRVS Negative 04/10/2018    ADVBE Negative 04/10/2018    ADVC Negative 04/10/2018    ADVC Negative 02/27/2018     Historical CMV results (last 3 of prior testing):  Lab Results   Component Value Date    CMVQNT 229 (A) 07/09/2018    CMVQNT CMV DNA Not Detected 06/05/2018    CMVQNT CMV DNA Not Detected 05/16/2018     Lab Results   Component Value Date    CMVLOG 2.4 (H) 07/09/2018    CMVLOG Not Calculated 06/05/2018    CMVLOG Not Calculated 05/16/2018     Urine Studies    Recent Labs   Lab Test  03/04/18   1425   URINEPH  5.5   NITRITE  Negative   LEUKEST  Negative   WBCU  5       CXR 2V 7/9/2018 report and film personally reviewed by me  Impression:   1. Largely unchanged radiograph of bilateral lung transplant.    PFT interpretation 7/9/2018: valid and meets ATS guidelines  1. FVC is 1.49L (46%)  2. FEV1 is 0.93 (35%) c/w severe obstruction  3. FEV1/FVC 62  Impression: her FEV1 in improved by 200 ml in comparions to her last FEV1 performed on 6/5/18 which is a significant increase. However it is significantly below her post-LTX best FEV1 of 1.25L on 4/5/18    Patient was seen and examined by me. I personally reviewed the electronic medical record including labs, flowsheets, imaging reports and films, vitals, and medications.    Clinical update was provided to the patient and her .I answered all of their questions and provided them support    Alex Hale MD, [facility]   of Medicine  Pulmonary, Critical Care and Sleep Medicine  [facility]  Pager: [phone]

## 2018-07-09 NOTE — PROGRESS NOTES
INR today is 1.13.  Kecia is scheduled for a bronch tomorrow, 7/10/18.  She is holding warfarin and bridging with lovenox.  No call made today.  Anju Meyers RN

## 2018-07-09 NOTE — MR AVS SNAPSHOT
Kecia Blue   7/9/2018   Anticoagulation Therapy Visit    Description:  57 year old female   Provider:  Anju Meyers RN   Department:  [facility]           INR as of 7/9/2018     Today's INR 1.13!      Anticoagulation Summary as of 7/9/2018     INR goal 2.0-3.0   Today's INR 1.13!   Full warfarin instructions 7/9: Hold; Otherwise No maintenance plan   Next INR check 7/10/2018    Indications   Lung transplant recipient (H) [Z94.2]  Deep vein thrombosis (DVT) (H) [I82.409] [I82.409]         July 2018 Details    Sun Mon Tue Wed Thu Fri Sat     1               2               3               4               5               6               7                 8               9      Hold   See details      10            11               12               13               14                 15               16               17               18               19               20               21                 22               23               24               25               26               27               28                 29               30               31                    Date Details   07/09 This INR check       Date of next INR:  7/10/2018         How to take your warfarin dose     Hold Do not take your warfarin dose. See the Details table to the right for additional instructions.

## 2018-07-09 NOTE — PATIENT INSTRUCTIONS
Patient Instructions  1. Start Advair as directed.  2. OR bronch at 1030.  Check in at 0830.  Follow previous prep instructions.  3. Continue to hydrate 70-80 oz fluids daily.  4. Call ortho locally for knee injection while off coumadin.    Next transplant clinic appointment:  As per Dr Lin's OR schedule.  Dr Hale and Evelyn with CXR, labs and PFTs  Next lab draw:  As needed every 2 weeks or per Evelyn.        ~~~~~~~~~~~~~~~~~~~~~~~~~    Thoracic Transplant Office phone [phone], fax [phone]    Office Hours 8:30 - 5:00     For after-hours urgent issues, please dial [phone], and ask to speak with the Thoracic Transplant Coordinator  On-Call, pager [phone].  --------------------  To expedite your medication refill(s), please contact your pharmacy and have them fax a refill request to: [phone]  .   *Please allow 3 business days for routine medication refills.  *Please allow 5 business days for controlled substance medication refills.    **For Diabetic medications and supplies refill(s), please contact your pharmacy and have them  Contact your Endocrine team.  --------------------  For scheduling appointments call [facility] :  [phone]. For lab appointments call [phone] or [facility].  --------------------  Please Note: If you are active on [facility], all future test results will be sent by [employer] message only, and will no longer be called to patient. You may also receive communication directly from your physician.

## 2018-07-09 NOTE — NURSING NOTE
"Chief Complaint   Patient presents with     RECHECK     4 WK LUNG TX FOLLOW UP     /82  Pulse 107  Ht 1.626 m (5' 4\")  Wt 54.3 kg (119 lb 11.2 oz)  LMP 06/07/2014 (Exact Date)  SpO2 97%  BMI 20.55 kg/m2  MELONY BURTON CMA    "

## 2018-07-09 NOTE — MR AVS SNAPSHOT
After Visit Summary   7/9/2018    Kecia Blue    MRN: [id number]           Patient Information     Date Of Birth          1960        Visit Information        Provider Department      7/9/2018 10:40 AM Alex Hale MD [facility] Transplant        Care Instructions    Patient Instructions  1. Start Advair as directed.  2. OR bronch at 1030.  Check in at 0830.  Follow previous prep instructions.  3. Continue to hydrate 70-80 oz fluids daily.  4. Call ortho locally for knee injection while off coumadin.    Next transplant clinic appointment:  As per Dr Lin's OR schedule.  Dr Hale and Evelyn with CXR, labs and PFTs  Next lab draw:  As needed every 2 weeks or per Evelyn.        ~~~~~~~~~~~~~~~~~~~~~~~~~    Thoracic Transplant Office phone [phone], fax [phone]    Office Hours 8:30 - 5:00     For after-hours urgent issues, please dial [phone], and ask to speak with the Thoracic Transplant Coordinator  On-Call, pager [phone].  --------------------  To expedite your medication refill(s), please contact your pharmacy and have them fax a refill request to: [phone]  .   *Please allow 3 business days for routine medication refills.  *Please allow 5 business days for controlled substance medication refills.    **For Diabetic medications and supplies refill(s), please contact your pharmacy and have them  Contact your Endocrine team.  --------------------  For scheduling appointments call [facility] :  [phone]. For lab appointments call [phone] or [facility].  --------------------  Please Note: If you are active on [employer], all future test results will be sent by [employer] message only, and will no longer be called to patient. You may also receive communication directly from your physician.          Follow-ups after your visit        Your next 10 appointments already scheduled     Jul 10, 2018   Procedure with Wilber Lin MD   [facility],  "[place], Same Day Surgery (--)    [address] [place] [id number] [phone]              Who to contact     If you have questions or need follow up information about today's clinic visit or your schedule please contact [facility]ANSPL[facility] directly at [phone].  Normal or non-critical lab and imaging results will be communicated to you by MyChart, letter or phone within 4 business days after the clinic has received the results. If you do not hear from us within 7 days, please contact the clinic through [employer] or phone. If you have a critical or abnormal lab result, we will notify you by phone as soon as possible.  Submit refill requests through [employer] or call your pharmacy and they will forward the refill request to us. Please allow 3 business days for your refill to be completed.          Additional Information About Your Visit        [employer] Information     [employer] gives you secure access to your electronic health record. If you see a primary care provider, you can also send messages to your care team and make appointments. If you have questions, please call your primary care clinic.  If you do not have a primary care provider, please call [phone] and they will assist you.        Care EveryWhere ID     This is your Care EveryWhere ID. This could be used by other organizations to access your [facility] medical records  [id number]        Your Vitals Were     Pulse Height Last Period Pulse Oximetry BMI (Body Mass Index)       107 1.626 m (5' 4\") 06/07/2014 (Exact Date) 97% 20.55 kg/m2        Blood Pressure from Last 3 Encounters:   07/09/18 135/82   06/11/18 134/86   06/06/18 143/89    Weight from Last 3 Encounters:   07/09/18 54.3 kg (119 lb 11.2 oz)   06/11/18 52.8 kg (116 lb 6.5 oz)   06/05/18 54 kg (119 lb)              Today, you had the following     No orders found for display         Today's Medication Changes          These changes are accurate as of 7/9/18 11:43 AM.  If you " have any questions, ask your nurse or doctor.               These medicines have changed or have updated prescriptions.        Dose/Directions    calcium-vitamin D 600-400 MG-UNIT per tablet   Commonly known as:  CALTRATE   This may have changed:  when to take this   Used for:  S/P lung transplant (H)        Dose:  1 tablet   Take 1 tablet by mouth 2 times daily (with meals)   Quantity:  60 tablet   Refills:  11       gabapentin 100 MG capsule   Commonly known as:  NEURONTIN   This may have changed:  Another medication with the same name was removed. Continue taking this medication, and follow the directions you see here.   Used for:  Atypical chest pain, Lung transplant recipient (H)   Changed by:  Alex Hale MD        Dose:  100 mg   Take 1 capsule (100 mg) by mouth 2 times daily   Quantity:  60 capsule   Refills:  3                Primary Care Provider Office Phone # Fax #    Rosymanjit Vu [phone]       [facility]  [address]  Critical access hospital [address]        Equal Access to Services     [facility] : Hadii patricia ku hadasho Soomaali, waaxda luqadaha, qaybta kaalmada adeegyada, waxay yoliin haydionyn tammy daily . So [facility] [phone].    ATENCIÓN: Si habla español, tiene a taylor disposición servicios gratuitos de asistencia lingüística. Llame al [phone].    We comply with applicable federal civil rights laws and [place] laws. We do not discriminate on the basis of race, color, national origin, age, disability, sex, sexual orientation, or gender identity.            Thank you!     Thank you for choosing [facility]ANS[facility]  for your care. Our goal is always to provide you with excellent care. Hearing back from our patients is one way we can continue to improve our services. Please take a few minutes to complete the written survey that you may receive in the mail after your visit with us. Thank you!             Your Updated Medication List - Protect  others around you: Learn how to safely use, store and throw away your medicines at www.disposemymeds.org.          This list is accurate as of 7/9/18 11:43 AM.  Always use your most recent med list.                   Brand Name Dispense Instructions for use Diagnosis    ACETAMINOPHEN PO      Take 1,000 mg by mouth every 6 hours as needed for pain        albuterol 108 (90 Base) MCG/ACT Inhaler    PROAIR HFA/PROVENTIL HFA/VENTOLIN HFA    1 Inhaler    Inhale 2 puffs into the lungs every 6 hours as needed for shortness of breath / dyspnea or wheezing    SOB (shortness of breath), Wheezing       calcium-vitamin D 600-400 MG-UNIT per tablet    CALTRATE    60 tablet    Take 1 tablet by mouth 2 times daily (with meals)    S/P lung transplant (H)       enoxaparin 60 MG/0.6ML injection    LOVENOX    25 Syringe    Inject 60 mg subcutaneously every 12 hours or as directed by Anticoagulation Clinic.    Acute deep vein thrombosis (DVT) of left upper extremity, unspecified vein (H), Lung transplant recipient (H)       gabapentin 100 MG capsule    NEURONTIN    60 capsule    Take 1 capsule (100 mg) by mouth 2 times daily    Atypical chest pain, Lung transplant recipient (H)       levalbuterol 45 MCG/ACT Inhaler    XOPENEX HFA    1 Inhaler    Inhale 2 puffs into the lungs every 6 hours as needed for shortness of breath / dyspnea or wheezing    S/P lung transplant (H), Other constipation       magnesium oxide 400 MG tablet    MAG-OX    30 tablet    Take 1 tablet (400 mg) by mouth daily    Hypomagnesemia       metoprolol tartrate 25 MG tablet    LOPRESSOR    60 tablet    Take 1 tablet (25 mg) by mouth 2 times daily    Paroxysmal atrial fibrillation (H)       multivitamin, therapeutic with minerals Tabs tablet     30 each    Take 1 tablet by mouth daily    Lung transplant recipient (H)       mycophenolate 250 MG capsule    GENERIC EQUIVALENT    360 capsule    Take 6 capsules (1,500 mg) by mouth 2 times daily    S/P lung transplant (H)        nystatin [id number] UNIT/ML suspension    MYCOSTATIN    1200 mL    Take 10 mLs (1,000,000 Units) by mouth 4 times daily    Lung transplant recipient (H)       order for DME     1 Device    Equipment being ordered: Oxygen 5 liters at rest, 15 liters with exertion.  Needs portability.  Please provide 2 10-liter concentrators for in the home    ILD (interstitial lung disease) (H), Hypoxia       pantoprazole 40 MG EC tablet    PROTONIX    30 tablet    Take 1 tablet (40 mg) by mouth every morning    Gastroesophageal reflux disease without esophagitis       pentoxifylline 400 MG CR tablet    TRENtal    60 tablet    Take 1 tablet (400 mg) by mouth 2 times daily    Lung transplant recipient (H)       * predniSONE 2.5 MG tablet    DELTASONE    90 tablet    Take 1 tablet (2.5 mg) by mouth daily 6/22/18             7.5                    7.5 7/20/18             7.5                    5 8/24/18             5                       5 9/21/18             5                       2.5    Lung replaced by transplant (H)       * predniSONE 5 MG tablet    DELTASONE    180 tablet    Follow taper card    S/P lung transplant (H)       sulfamethoxazole-trimethoprim 400-80 MG per tablet    BACTRIM/SEPTRA    30 tablet    1 tablet by Oral or NG Tube route daily    Organ transplant candidate, Lung disease, interstitial (H), Abnormal chest CT, Hypotension, unspecified hypotension type, Acute on chronic respiratory failure with hypoxia (H), ILD (interstitial lung disease) (H)       tacrolimus 1 MG capsule    GENERIC EQUIVALENT    330 capsule    Take 5 mg in the AM and 6 mg in the PM    S/P lung transplant (H), Other constipation       warfarin 1 MG tablet    COUMADIN    140 tablet    Take 4mg MWFSat and 6mg TuThSun, or as directed by the [facility] at [facility]    DVT (deep venous thrombosis) (H), Status post lung transplantation (H)       * Notice:  This list has 2 medication(s) that are the same as other medications  prescribed for you. Read the directions carefully, and ask your doctor or other care provider to review them with you.

## 2018-07-09 NOTE — NURSING NOTE
Transplant Coordinator Note    Reason for visit: Post lung transplant follow up visit   Coordinator: Present   Caregiver:  vinay    Health concerns addressed today:  1. Wheezing.  OR bronch tomorrow.  2. Knee injury.  3.     Activity:     Oxygen needs:     Pain management:     Diabetic management:     Pt Education:     Health Maintenance:     Labs, CXR, PFTs reviewed with patient  Medication record reviewed and reconciled  Questions and concerns addressed    Patient Instructions  1. Start Advair as directed.  2. OR bronch at 1030.  Check in at 0830.  Follow previous prep instructions.      Next transplant clinic appointment:  As per Dr Lin's OR schedule.  Dr Hale and Evelyn with CXR, labs and PFTs  Next lab draw:  As needed every 2 weeks      AVS printed at time of check out

## 2018-07-10 ENCOUNTER — ANESTHESIA (OUTPATIENT)
Dept: SURGERY | Facility: CLINIC | Age: 58
End: 2018-07-10

## 2018-07-10 ENCOUNTER — ANTICOAGULATION THERAPY VISIT (OUTPATIENT)
Dept: ANTICOAGULATION | Facility: CLINIC | Age: 58
End: 2018-07-10

## 2018-07-10 ENCOUNTER — HOSPITAL ENCOUNTER (OUTPATIENT)
Facility: CLINIC | Age: 58
Discharge: HOME | End: 2018-07-10
Attending: INTERNAL MEDICINE | Admitting: INTERNAL MEDICINE
Payer: COMMERCIAL

## 2018-07-10 VITALS
TEMPERATURE: 98.2 F | WEIGHT: 118.39 LBS | BODY MASS INDEX: 20.21 KG/M2 | DIASTOLIC BLOOD PRESSURE: 82 MMHG | RESPIRATION RATE: 16 BRPM | OXYGEN SATURATION: 99 % | HEART RATE: 100 BPM | HEIGHT: 64 IN | SYSTOLIC BLOOD PRESSURE: 140 MMHG

## 2018-07-10 DIAGNOSIS — J98.09 BRONCHIAL STENOSIS: ICD-10-CM

## 2018-07-10 DIAGNOSIS — I82.622 ACUTE DEEP VEIN THROMBOSIS (DVT) OF LEFT UPPER EXTREMITY, UNSPECIFIED VEIN (H): ICD-10-CM

## 2018-07-10 DIAGNOSIS — Z94.2 H/O LUNG TRANSPLANT (H): Primary | ICD-10-CM

## 2018-07-10 DIAGNOSIS — Z94.2 LUNG TRANSPLANT RECIPIENT (H): ICD-10-CM

## 2018-07-10 LAB
APPEARANCE FLD: NORMAL
APPEARANCE FLD: NORMAL
CMV DNA SPEC NAA+PROBE-ACNC: 229 [IU]/ML
CMV DNA SPEC NAA+PROBE-LOG#: 2.4 {LOG_IU}/ML
COLOR FLD: NORMAL
COLOR FLD: NORMAL
COPATH REPORT: NORMAL
GLUCOSE BLDC GLUCOMTR-MCNC: 100 MG/DL (ref 70–99)
GRAM STN SPEC: ABNORMAL
KOH PREP SPEC: NORMAL
KOH PREP SPEC: NORMAL
LYMPHOCYTES NFR FLD MANUAL: 1 %
LYMPHOCYTES NFR FLD MANUAL: 1 %
MONOS+MACROS NFR FLD MANUAL: 18 %
MONOS+MACROS NFR FLD MANUAL: 8 %
NEUTS BAND NFR FLD MANUAL: 81 %
NEUTS BAND NFR FLD MANUAL: 91 %
PRA DONOR SPECIFIC ABY: NORMAL
SPECIMEN SOURCE FLD: NORMAL
SPECIMEN SOURCE FLD: NORMAL
SPECIMEN SOURCE: ABNORMAL
SPECIMEN SOURCE: NORMAL
SPECIMEN SOURCE: NORMAL
WBC # FLD AUTO: 2750 /UL
WBC # FLD AUTO: 976 /UL

## 2018-07-10 PROCEDURE — 87205 SMEAR GRAM STAIN: CPT | Performed by: INTERNAL MEDICINE

## 2018-07-10 PROCEDURE — 87015 SPECIMEN INFECT AGNT CONCNTJ: CPT | Performed by: INTERNAL MEDICINE

## 2018-07-10 PROCEDURE — 88108 CYTOPATH CONCENTRATE TECH: CPT | Performed by: INTERNAL MEDICINE

## 2018-07-10 PROCEDURE — 87210 SMEAR WET MOUNT SALINE/INK: CPT | Performed by: INTERNAL MEDICINE

## 2018-07-10 PROCEDURE — 37000009 ZZH ANESTHESIA TECHNICAL FEE, EACH ADDTL 15 MIN: Performed by: INTERNAL MEDICINE

## 2018-07-10 PROCEDURE — 25000125 ZZHC RX 250: Performed by: ANESTHESIOLOGY

## 2018-07-10 PROCEDURE — 87206 SMEAR FLUORESCENT/ACID STAI: CPT | Performed by: INTERNAL MEDICINE

## 2018-07-10 PROCEDURE — 40000170 ZZH STATISTIC PRE-PROCEDURE ASSESSMENT II: Performed by: INTERNAL MEDICINE

## 2018-07-10 PROCEDURE — 87070 CULTURE OTHR SPECIMN AEROBIC: CPT | Performed by: INTERNAL MEDICINE

## 2018-07-10 PROCEDURE — 25000128 H RX IP 250 OP 636: Performed by: ANESTHESIOLOGY

## 2018-07-10 PROCEDURE — 89051 BODY FLUID CELL COUNT: CPT | Performed by: INTERNAL MEDICINE

## 2018-07-10 PROCEDURE — 27210794 ZZH OR GENERAL SUPPLY STERILE: Performed by: INTERNAL MEDICINE

## 2018-07-10 PROCEDURE — 87102 FUNGUS ISOLATION CULTURE: CPT | Performed by: INTERNAL MEDICINE

## 2018-07-10 PROCEDURE — 87076 CULTURE ANAEROBE IDENT EACH: CPT | Performed by: INTERNAL MEDICINE

## 2018-07-10 PROCEDURE — C1726 CATH, BAL DIL, NON-VASCULAR: HCPCS | Performed by: INTERNAL MEDICINE

## 2018-07-10 PROCEDURE — 87081 CULTURE SCREEN ONLY: CPT | Performed by: INTERNAL MEDICINE

## 2018-07-10 PROCEDURE — 71000027 ZZH RECOVERY PHASE 2 EACH 15 MINS: Performed by: INTERNAL MEDICINE

## 2018-07-10 PROCEDURE — 37000008 ZZH ANESTHESIA TECHNICAL FEE, 1ST 30 MIN: Performed by: INTERNAL MEDICINE

## 2018-07-10 PROCEDURE — 25000566 ZZH SEVOFLURANE, EA 15 MIN: Performed by: INTERNAL MEDICINE

## 2018-07-10 PROCEDURE — 71000014 ZZH RECOVERY PHASE 1 LEVEL 2 FIRST HR: Performed by: INTERNAL MEDICINE

## 2018-07-10 PROCEDURE — 87116 MYCOBACTERIA CULTURE: CPT | Performed by: INTERNAL MEDICINE

## 2018-07-10 PROCEDURE — 36000057 ZZH SURGERY LEVEL 3 1ST 30 MIN - UMMC: Performed by: INTERNAL MEDICINE

## 2018-07-10 PROCEDURE — 82962 GLUCOSE BLOOD TEST: CPT

## 2018-07-10 PROCEDURE — 88312 SPECIAL STAINS GROUP 1: CPT | Performed by: INTERNAL MEDICINE

## 2018-07-10 PROCEDURE — C9399 UNCLASSIFIED DRUGS OR BIOLOG: HCPCS | Performed by: ANESTHESIOLOGY

## 2018-07-10 PROCEDURE — 36000059 ZZH SURGERY LEVEL 3 EA 15 ADDTL MIN UMMC: Performed by: INTERNAL MEDICINE

## 2018-07-10 PROCEDURE — 87633 RESP VIRUS 12-25 TARGETS: CPT | Performed by: INTERNAL MEDICINE

## 2018-07-10 RX ORDER — MEPERIDINE HYDROCHLORIDE 50 MG/ML
12.5 INJECTION INTRAMUSCULAR; INTRAVENOUS; SUBCUTANEOUS
Status: DISCONTINUED | OUTPATIENT
Start: 2018-07-10 | End: 2018-07-10 | Stop reason: HOSPADM

## 2018-07-10 RX ORDER — NALOXONE HYDROCHLORIDE 0.4 MG/ML
.1-.4 INJECTION, SOLUTION INTRAMUSCULAR; INTRAVENOUS; SUBCUTANEOUS
Status: DISCONTINUED | OUTPATIENT
Start: 2018-07-10 | End: 2018-07-10 | Stop reason: HOSPADM

## 2018-07-10 RX ORDER — LIDOCAINE 40 MG/G
CREAM TOPICAL
Status: DISCONTINUED | OUTPATIENT
Start: 2018-07-10 | End: 2018-07-10 | Stop reason: HOSPADM

## 2018-07-10 RX ORDER — ONDANSETRON 2 MG/ML
4 INJECTION INTRAMUSCULAR; INTRAVENOUS EVERY 30 MIN PRN
Status: DISCONTINUED | OUTPATIENT
Start: 2018-07-10 | End: 2018-07-10 | Stop reason: HOSPADM

## 2018-07-10 RX ORDER — DEXAMETHASONE SODIUM PHOSPHATE 4 MG/ML
INJECTION, SOLUTION INTRA-ARTICULAR; INTRALESIONAL; INTRAMUSCULAR; INTRAVENOUS; SOFT TISSUE PRN
Status: DISCONTINUED | OUTPATIENT
Start: 2018-07-10 | End: 2018-07-10

## 2018-07-10 RX ORDER — PROPOFOL 10 MG/ML
INJECTION, EMULSION INTRAVENOUS PRN
Status: DISCONTINUED | OUTPATIENT
Start: 2018-07-10 | End: 2018-07-10

## 2018-07-10 RX ORDER — SODIUM CHLORIDE, SODIUM LACTATE, POTASSIUM CHLORIDE, CALCIUM CHLORIDE 600; 310; 30; 20 MG/100ML; MG/100ML; MG/100ML; MG/100ML
INJECTION, SOLUTION INTRAVENOUS CONTINUOUS
Status: DISCONTINUED | OUTPATIENT
Start: 2018-07-10 | End: 2018-07-10 | Stop reason: HOSPADM

## 2018-07-10 RX ORDER — PROPOFOL 10 MG/ML
INJECTION, EMULSION INTRAVENOUS CONTINUOUS PRN
Status: DISCONTINUED | OUTPATIENT
Start: 2018-07-10 | End: 2018-07-10

## 2018-07-10 RX ORDER — LIDOCAINE HYDROCHLORIDE 20 MG/ML
INJECTION, SOLUTION INFILTRATION; PERINEURAL PRN
Status: DISCONTINUED | OUTPATIENT
Start: 2018-07-10 | End: 2018-07-10

## 2018-07-10 RX ORDER — FENTANYL CITRATE 50 UG/ML
25-50 INJECTION, SOLUTION INTRAMUSCULAR; INTRAVENOUS
Status: DISCONTINUED | OUTPATIENT
Start: 2018-07-10 | End: 2018-07-10 | Stop reason: HOSPADM

## 2018-07-10 RX ORDER — ONDANSETRON 2 MG/ML
INJECTION INTRAMUSCULAR; INTRAVENOUS PRN
Status: DISCONTINUED | OUTPATIENT
Start: 2018-07-10 | End: 2018-07-10

## 2018-07-10 RX ORDER — FENTANYL CITRATE 50 UG/ML
25-50 INJECTION, SOLUTION INTRAMUSCULAR; INTRAVENOUS EVERY 5 MIN PRN
Status: DISCONTINUED | OUTPATIENT
Start: 2018-07-10 | End: 2018-07-10 | Stop reason: HOSPADM

## 2018-07-10 RX ORDER — ONDANSETRON 4 MG/1
4 TABLET, ORALLY DISINTEGRATING ORAL EVERY 30 MIN PRN
Status: DISCONTINUED | OUTPATIENT
Start: 2018-07-10 | End: 2018-07-10 | Stop reason: HOSPADM

## 2018-07-10 RX ADMIN — LIDOCAINE HYDROCHLORIDE 60 MG: 20 INJECTION, SOLUTION INFILTRATION; PERINEURAL at 10:02

## 2018-07-10 RX ADMIN — PROPOFOL 120 MCG/KG/MIN: 10 INJECTION, EMULSION INTRAVENOUS at 10:08

## 2018-07-10 RX ADMIN — ROCURONIUM BROMIDE 30 MG: 10 INJECTION INTRAVENOUS at 10:02

## 2018-07-10 RX ADMIN — DEXAMETHASONE SODIUM PHOSPHATE 4 MG: 4 INJECTION, SOLUTION INTRA-ARTICULAR; INTRALESIONAL; INTRAMUSCULAR; INTRAVENOUS; SOFT TISSUE at 10:13

## 2018-07-10 RX ADMIN — ONDANSETRON 4 MG: 2 INJECTION INTRAMUSCULAR; INTRAVENOUS at 10:13

## 2018-07-10 RX ADMIN — SUGAMMADEX 100 MG: 100 INJECTION, SOLUTION INTRAVENOUS at 10:29

## 2018-07-10 RX ADMIN — PROPOFOL 100 MG: 10 INJECTION, EMULSION INTRAVENOUS at 10:02

## 2018-07-10 NOTE — ANESTHESIA POSTPROCEDURE EVALUATION
Patient: Kecia Blue    Procedure(s):  Flexible Bronchoscopy, Balloon Dilation, Bronchial Washings   - Wound Class: II-Clean Contaminated    Diagnosis:Bronchial Stenosis   Diagnosis Additional Information: No value filed.    Anesthesia Type:  General, ETT    Note:  Anesthesia Post Evaluation    Patient location during evaluation: PACU  Patient participation: Able to fully participate in evaluation  Level of consciousness: awake and alert  Pain management: adequate  Airway patency: patent  Cardiovascular status: acceptable  Respiratory status: acceptable  Hydration status: acceptable  PONV: none     Anesthetic complications: None          Last vitals:  Vitals:    07/10/18 0846 07/10/18 1040   BP: (!) 150/94 130/78   Pulse: 100    Resp: 18 18   Temp: 37.1  C (98.7  F) 36.6  C (97.8  F)   SpO2: 99% 100%         Electronically Signed By: Shree Lozano MD  July 10, 2018  10:55 AM

## 2018-07-10 NOTE — DISCHARGE INSTRUCTIONS
Enoxaparin Sodium Solution for injection  What is this medicine?  ENOXAPARIN (ee nox a PA rin) is used after knee, hip, or abdominal surgeries to prevent blood clotting. It is also used to treat existing blood clots in the lungs or in the veins.  This medicine may be used for other purposes; ask your health care provider or pharmacist if you have questions.  What should I tell my health care provider before I take this medicine?  They need to know if you have any of these conditions:    bleeding disorders, hemorrhage, or hemophilia    infection of the heart or heart valves    kidney or liver disease    previous stroke    prosthetic heart valve    recent surgery or delivery of a baby    ulcer in the stomach or intestine, diverticulitis, or other bowel disease    an unusual or allergic reaction to enoxaparin, heparin, pork or pork products, other medicines, foods, dyes, or preservatives    pregnant or trying to get pregnant    breast-feeding  How should I use this medicine?  This medicine is for injection under the skin. It is usually given by a health-care professional. You or a family member may be trained on how to give the injections. If you are to give yourself injections, make sure you understand how to use the syringe, measure the dose if necessary, and give the injection. To avoid bruising, do not rub the site where this medicine has been injected. Do not take your medicine more often than directed. Do not stop taking except on the advice of your doctor or health care professional.  Make sure you receive a puncture-resistant container to dispose of the needles and syringes once you have finished with them. Do not reuse these items. Return the container to your doctor or health care professional for proper disposal.  Talk to your pediatrician regarding the use of this medicine in children. Special care may be needed.  Overdosage: If you think you have taken too much of this medicine contact a poison control  center or emergency room at once.  NOTE: This medicine is only for you. Do not share this medicine with others.  What if I miss a dose?  If you miss a dose, take it as soon as you can. If it is almost time for your next dose, take only that dose. Do not take double or extra doses.  What may interact with this medicine?    aspirin and aspirin-like medicines    certain medicines that treat or prevent blood clots    dipyridamole    NSAIDs, medicines for pain and inflammation, like ibuprofen or naproxen  This list may not describe all possible interactions. Give your health care provider a list of all the medicines, herbs, non-prescription drugs, or dietary supplements you use. Also tell them if you smoke, drink alcohol, or use illegal drugs. Some items may interact with your medicine.  What should I watch for while using this medicine?  Visit your doctor or health care professional for regular checks on your progress. Your condition will be monitored carefully while you are receiving this medicine.  Notify your doctor or health care professional and seek emergency treatment if you develop breathing problems; changes in vision; chest pain; severe, sudden headache; pain, swelling, warmth in the leg; trouble speaking; sudden numbness or weakness of the face, arm, or leg. These can be signs that your condition has gotten worse.  If you are going to have surgery, tell your doctor or health care professional that you are taking this medicine.  Do not stop taking this medicine without first talking to your doctor. Be sure to refill your prescription before you run out of medicine.  Avoid sports and activities that might cause injury while you are using this medicine. Severe falls or injuries can cause unseen bleeding. Be careful when using sharp tools or knives. Consider using an electric razor. Take special care brushing or flossing your teeth. Report any injuries, bruising, or red spots on the skin to your doctor or health  care professional.  What side effects may I notice from receiving this medicine?  Side effects that you should report to your doctor or health care professional as soon as possible:    allergic reactions like skin rash, itching or hives, swelling of the face, lips, or tongue    feeling faint or lightheaded, falls    signs and symptoms of bleeding such as bloody or black, tarry stools; red or dark-brown urine; spitting up blood or brown material that looks like coffee grounds; red spots on the skin; unusual bruising or bleeding from the eye, gums, or nose  Side effects that usually do not require medical attention (report to your doctor or health care professional if they continue or are bothersome):    pain, redness, or irritation at site where injected  This list may not describe all possible side effects. Call your doctor for medical advice about side effects. You may report side effects to FDA at 0-321-FDA-5335.  Where should I keep my medicine?  Keep out of the reach of children.  Store at room temperature between 15 and 30 degrees C (59 and 86 degrees F). Do not freeze. If your injections have been specially prepared, you may need to store them in the refrigerator. Ask your pharmacist. Throw away any unused medicine after the expiration date.  NOTE:This sheet is a summary. It may not cover all possible information. If you have questions about this medicine, talk to your doctor, pharmacist, or health care provider. Copyright  2016 Gold Standard             [facility], [place]  Same-Day Surgery   Adult Discharge Orders & Instructions     For 24 hours after surgery    1. Get plenty of rest.  A responsible adult must stay with you for at least 24 hours after you leave the hospital.   2. Do not drive or use heavy equipment.  If you have weakness or tingling, don't drive or use heavy equipment until this feeling goes away.  3. Do not drink alcohol.  4. Avoid strenuous or risky activities.   Ask for help when climbing stairs.   5. You may feel lightheaded.  IF so, sit for a few minutes before standing.  Have someone help you get up.   6. If you have nausea (feel sick to your stomach): Drink only clear liquids such as apple juice, ginger ale, broth or 7-Up.  Rest may also help.  Be sure to drink enough fluids.  Move to a regular diet as you feel able.  7. You may have a slight fever. Call the doctor if your fever is over 100 F (37.7 C) (taken under the tongue) or lasts longer than 24 hours.  8. You may have a dry mouth, a sore throat, muscle aches or trouble sleeping.  These should go away after 24 hours.  9. Do not make important or legal decisions.   Call your doctor for any of the followin.  Signs of infection (fever, growing tenderness at the surgery site, a large amount of drainage or bleeding, severe pain, foul-smelling drainage, redness, swelling).    2. It has been over 8 to 10 hours since surgery and you are still not able to urinate (pass water).    3.  Headache for over 24 hours.    4.  Numbness, tingling or weakness the day after surgery (if you had spinal anesthesia).  To contact a doctor, call ___Dr. Lin's office at [phone]  _____________________________________ or:        [phone] and ask for the resident on call for   Pulmonary (answered 24 hours a day)      Emergency Department:    [facility]: [phone]       (TTY for hearing impaired: [phone])            Post Bronchoscopy Patient Instructions:    July 10, 2018  Kecia Blue    Your procedure completed (bronchoscopy with airway balloon dilation and bronchial wash/cleaning of secretions) without any immediate complications.  You may cough up scant amount of blood for the next 12 hours. If you have excessive cough with blood, chest pain, shortness of breath, please report to the closest emergency room.    You may experience low grade (less than 100.5 F) fever next 24 hours, if so you can take  tylenol. If the fever persists more than 24 hours contact to our office or your primary care provider.    Our office (Thoracic/Pulmonary--[phone]) will call to schedule next dilation    Should you have any question, please do not hesitate to call our office.    DA Lin MD

## 2018-07-10 NOTE — PROGRESS NOTES
ANTICOAGULATION FOLLOW-UP CLINIC VISIT    Patient Name:  Kecia Blue  Date:  7/10/2018  Contact Type:  Telephone    SUBJECTIVE:     Patient Findings     Comments Pt had a successful bronch, but is scheduled for a cortisone injection on 7/13. Pt will continue taking Lovenox 60mg Q 12 Hours starting tonight per MD and will only take Lovenox 60mg AM on 7/12. Pool message sent to f/u with pt after cortisone injections.            OBJECTIVE    INR   Date Value Ref Range Status   07/09/2018 1.13 0.86 - 1.14 Final       ASSESSMENT / PLAN  No question data found.  Anticoagulation Summary as of 7/10/2018     INR goal 2.0-3.0   Today's INR No new INR was available at the time of this encounter.   Warfarin maintenance plan No maintenance plan   Full warfarin instructions 7/10: Hold; 7/11: Hold; 7/12: Hold; Otherwise No maintenance plan   Next INR check 7/13/2018   Priority INR   Target end date     Indications   Lung transplant recipient (H) [Z94.2]  Deep vein thrombosis (DVT) (H) [I82.409] [I82.409]         Anticoagulation Episode Summary     INR check location Clinic Lab    Preferred lab     Send INR reminders to [facility]    Comments Plan for 3 months of therapy- Provoked DVTHIPPA OK and  Verbal OK to speak with spouse and leave msg @ [phone].  labs @ [facility] starting ~ 5/25/18.  fax: [phone]. ph:[phone] BRONCH PLAN NOTE 6/6/18      Anticoagulation Care Providers     Provider Role Specialty Phone number    Ame Chow PA-C Responsible Pulmonary [phone]            See the Encounter Report to view Anticoagulation Flowsheet and Dosing Calendar (Go to Encounters tab in chart review, and find the Anticoagulation Therapy Visit)    Spoke with patient. Gave them their lab results and new warfarin recommendation.  No changes in health, medication, or diet. No missed doses, no falls. No signs or symptoms of bleed or clotting.     Ofe Redmond RN

## 2018-07-10 NOTE — IP AVS SNAPSHOT
MRN:[id number]                      After Visit Summary   7/10/2018    Kecia Blue    MRN: [id number]           Thank you!     Thank you for choosing [facility] for your care. Our goal is always to provide you with excellent care. Hearing back from our patients is one way we can continue to improve our services. Please take a few minutes to complete the written survey that you may receive in the mail after you visit with us. Thank you!        Patient Information     Date Of Birth          1960        About your hospital stay     You were admitted on:  July 10, 2018 You last received care in the:  Same Day Surgery [facility]    You were discharged on:  July 10, 2018       Who to Call     For medical emergencies, please call 911.  For non-urgent questions about your medical care, please call your primary care provider or clinic, [phone]  For questions related to your surgery, please call your surgery clinic        Attending Provider     Provider Michell Lin, Wilber Warner MD Pulmonary       Primary Care Provider Office Phone # Fax #    Rosy Vu [phone]      Further instructions from your care team       Enoxaparin Sodium Solution for injection  What is this medicine?  ENOXAPARIN (ee nox a PA rin) is used after knee, hip, or abdominal surgeries to prevent blood clotting. It is also used to treat existing blood clots in the lungs or in the veins.  This medicine may be used for other purposes; ask your health care provider or pharmacist if you have questions.  What should I tell my health care provider before I take this medicine?  They need to know if you have any of these conditions:    bleeding disorders, hemorrhage, or hemophilia    infection of the heart or heart valves    kidney or liver disease    previous stroke    prosthetic heart valve    recent surgery or delivery of a baby    ulcer in the stomach or intestine, diverticulitis, or  other bowel disease    an unusual or allergic reaction to enoxaparin, heparin, pork or pork products, other medicines, foods, dyes, or preservatives    pregnant or trying to get pregnant    breast-feeding  How should I use this medicine?  This medicine is for injection under the skin. It is usually given by a health-care professional. You or a family member may be trained on how to give the injections. If you are to give yourself injections, make sure you understand how to use the syringe, measure the dose if necessary, and give the injection. To avoid bruising, do not rub the site where this medicine has been injected. Do not take your medicine more often than directed. Do not stop taking except on the advice of your doctor or health care professional.  Make sure you receive a puncture-resistant container to dispose of the needles and syringes once you have finished with them. Do not reuse these items. Return the container to your doctor or health care professional for proper disposal.  Talk to your pediatrician regarding the use of this medicine in children. Special care may be needed.  Overdosage: If you think you have taken too much of this medicine contact a poison control center or emergency room at once.  NOTE: This medicine is only for you. Do not share this medicine with others.  What if I miss a dose?  If you miss a dose, take it as soon as you can. If it is almost time for your next dose, take only that dose. Do not take double or extra doses.  What may interact with this medicine?    aspirin and aspirin-like medicines    certain medicines that treat or prevent blood clots    dipyridamole    NSAIDs, medicines for pain and inflammation, like ibuprofen or naproxen  This list may not describe all possible interactions. Give your health care provider a list of all the medicines, herbs, non-prescription drugs, or dietary supplements you use. Also tell them if you smoke, drink alcohol, or use illegal drugs.  Some items may interact with your medicine.  What should I watch for while using this medicine?  Visit your doctor or health care professional for regular checks on your progress. Your condition will be monitored carefully while you are receiving this medicine.  Notify your doctor or health care professional and seek emergency treatment if you develop breathing problems; changes in vision; chest pain; severe, sudden headache; pain, swelling, warmth in the leg; trouble speaking; sudden numbness or weakness of the face, arm, or leg. These can be signs that your condition has gotten worse.  If you are going to have surgery, tell your doctor or health care professional that you are taking this medicine.  Do not stop taking this medicine without first talking to your doctor. Be sure to refill your prescription before you run out of medicine.  Avoid sports and activities that might cause injury while you are using this medicine. Severe falls or injuries can cause unseen bleeding. Be careful when using sharp tools or knives. Consider using an electric razor. Take special care brushing or flossing your teeth. Report any injuries, bruising, or red spots on the skin to your doctor or health care professional.  What side effects may I notice from receiving this medicine?  Side effects that you should report to your doctor or health care professional as soon as possible:    allergic reactions like skin rash, itching or hives, swelling of the face, lips, or tongue    feeling faint or lightheaded, falls    signs and symptoms of bleeding such as bloody or black, tarry stools; red or dark-brown urine; spitting up blood or brown material that looks like coffee grounds; red spots on the skin; unusual bruising or bleeding from the eye, gums, or nose  Side effects that usually do not require medical attention (report to your doctor or health care professional if they continue or are bothersome):    pain, redness, or irritation at site  where injected  This list may not describe all possible side effects. Call your doctor for medical advice about side effects. You may report side effects to FDA at 9-754-SBD-9945.  Where should I keep my medicine?  Keep out of the reach of children.  Store at room temperature between 15 and 30 degrees C (59 and 86 degrees F). Do not freeze. If your injections have been specially prepared, you may need to store them in the refrigerator. Ask your pharmacist. Throw away any unused medicine after the expiration date.  NOTE:This sheet is a summary. It may not cover all possible information. If you have questions about this medicine, talk to your doctor, pharmacist, or health care provider. Copyright  2016 Gold Standard             [facility], [place]  Same-Day Surgery   Adult Discharge Orders & Instructions     For 24 hours after surgery    1. Get plenty of rest.  A responsible adult must stay with you for at least 24 hours after you leave the hospital.   2. Do not drive or use heavy equipment.  If you have weakness or tingling, don't drive or use heavy equipment until this feeling goes away.  3. Do not drink alcohol.  4. Avoid strenuous or risky activities.  Ask for help when climbing stairs.   5. You may feel lightheaded.  IF so, sit for a few minutes before standing.  Have someone help you get up.   6. If you have nausea (feel sick to your stomach): Drink only clear liquids such as apple juice, ginger ale, broth or 7-Up.  Rest may also help.  Be sure to drink enough fluids.  Move to a regular diet as you feel able.  7. You may have a slight fever. Call the doctor if your fever is over 100 F (37.7 C) (taken under the tongue) or lasts longer than 24 hours.  8. You may have a dry mouth, a sore throat, muscle aches or trouble sleeping.  These should go away after 24 hours.  9. Do not make important or legal decisions.   Call your doctor for any of the followin.  Signs of infection  (fever, growing tenderness at the surgery site, a large amount of drainage or bleeding, severe pain, foul-smelling drainage, redness, swelling).    2. It has been over 8 to 10 hours since surgery and you are still not able to urinate (pass water).    3.  Headache for over 24 hours.    4.  Numbness, tingling or weakness the day after surgery (if you had spinal anesthesia).  To contact a doctor, call ___Dr. Lin's office at [phone]  _____________________________________ or:        [phone] and ask for the resident on call for   Pulmonary (answered 24 hours a day)      Emergency Department:    [facility]: [phone]       (TTY for hearing impaired: [phone])            Post Bronchoscopy Patient Instructions:    July 10, 2018  Kecia Blue    Your procedure completed (bronchoscopy with airway balloon dilation and bronchial wash/cleaning of secretions) without any immediate complications.  You may cough up scant amount of blood for the next 12 hours. If you have excessive cough with blood, chest pain, shortness of breath, please report to the closest emergency room.    You may experience low grade (less than 100.5 F) fever next 24 hours, if so you can take tylenol. If the fever persists more than 24 hours contact to our office or your primary care provider.    Our office (Thoracic/Pulmonary--[phone]) will call to schedule next dilation    Should you have any question, please do not hesitate to call our office.    DA Lin MD    Pending Results     Date and Time Order Name Status Description    7/10/2018 1021 Cytology non gyn In process     7/10/2018 1021 Respiratory Virus Panel by PCR In process     7/10/2018 1021 Bronchial Culture Aerobic Bacterial In process     7/10/2018 1021 Nocardia culture In process     7/10/2018 1021 Koh prep In process     7/10/2018 1021 Gram stain In process     7/10/2018 1021 Fungus Culture, non-blood In process     7/10/2018 1021 CMV DNA  "quantification In process     7/10/2018 1021 Cell count with differential fluid In process     7/10/2018 1021 AFB Stain Non Blood In process     7/10/2018 1021 AFB Culture Non Blood In process     7/10/2018 1021 Actinomyces rule out In process     7/10/2018 1016 Cytology non gyn In process     7/10/2018 1016 Respiratory Virus Panel by PCR In process     7/10/2018 1016 Bronchial Culture Aerobic Bacterial In process     7/10/2018 1016 Nocardia culture In process     7/10/2018 1016 Christian prep In process     7/10/2018 1016 Gram stain In process     7/10/2018 1016 Fungus Culture, non-blood In process     7/10/2018 1016 CMV DNA quantification In process     7/10/2018 1016 Cell count with differential fluid In process     7/10/2018 1016 AFB Stain Non Blood In process     7/10/2018 1016 AFB Culture Non Blood In process     7/10/2018 1016 Actinomyces rule out In process     7/9/2018 0905 PRA DONOR SPECIFIC ANTIBODY In process             Admission Information     Date & Time Provider Department Dept. Phone    7/10/2018 Wilber Lin MD Same Day Surgery [facility] [phone]      Your Vitals Were     Blood Pressure Pulse Temperature Respirations Height Weight    141/82 100 98.3  F (36.8  C) (Oral) 16 1.626 m (5' 4\") 53.7 kg (118 lb 6.2 oz)    Last Period Pulse Oximetry BMI (Body Mass Index)             06/07/2014 (Exact Date) 99% 20.32 kg/m2         [employer] Information     [employer] gives you secure access to your electronic health record. If you see a primary care provider, you can also send messages to your care team and make appointments. If you have questions, please call your primary care clinic.  If you do not have a primary care provider, please call [phone] and they will assist you.        Care EveryWhere ID     This is your Care EveryWhere ID. This could be used by other organizations to access your [facility] medical records  [id number]        Equal Access to Services     ALBAN VALENCIA AH: Sahara moon " esteban Lucas, wadarwinda luqadaha, qaybta kaalmada татьяна, morro yoliin hayaan gianlucakelli dorimarilyn lalioneloina estefani [facility] [phone].    ATENCIÓN: Si kylela jordana, tiene a taylor disposición servicios gratuitos de asistencia lingüística. Zachary al [phone].    We comply with applicable federal civil rights laws and [place] laws. We do not discriminate on the basis of race, color, national origin, age, disability, sex, sexual orientation, or gender identity.               Review of your medicines      CONTINUE these medicines which may have CHANGED, or have new prescriptions. If we are uncertain of the size of tablets/capsules you have at home, strength may be listed as something that might have changed.        Dose / Directions    calcium-vitamin D 600-400 MG-UNIT per tablet   Commonly known as:  CALTRATE   This may have changed:  when to take this   Used for:  S/P lung transplant (H)        Dose:  1 tablet   Take 1 tablet by mouth 2 times daily (with meals)   Quantity:  60 tablet   Refills:  11         CONTINUE these medicines which have NOT CHANGED        Dose / Directions    ACETAMINOPHEN PO        Dose:  1000 mg   Take 1,000 mg by mouth every 6 hours as needed for pain   Refills:  0       albuterol 108 (90 Base) MCG/ACT Inhaler   Commonly known as:  PROAIR HFA/PROVENTIL HFA/VENTOLIN HFA   Used for:  SOB (shortness of breath), Wheezing        Dose:  2 puff   Inhale 2 puffs into the lungs every 6 hours as needed for shortness of breath / dyspnea or wheezing   Quantity:  1 Inhaler   Refills:  1       enoxaparin 60 MG/0.6ML injection   Commonly known as:  LOVENOX   Used for:  Acute deep vein thrombosis (DVT) of left upper extremity, unspecified vein (H), Lung transplant recipient (H)        Inject 60 mg subcutaneously every 12 hours or as directed by Anticoagulation Clinic.   Quantity:  25 Syringe   Refills:  3       gabapentin 100 MG capsule   Commonly known as:  NEURONTIN   Used for:  Atypical chest pain, Lung  transplant recipient (H)        Dose:  100 mg   Take 1 capsule (100 mg) by mouth 2 times daily   Quantity:  60 capsule   Refills:  3       levalbuterol 45 MCG/ACT Inhaler   Commonly known as:  XOPENEX HFA   Used for:  S/P lung transplant (H), Other constipation        Dose:  2 puff   Inhale 2 puffs into the lungs every 6 hours as needed for shortness of breath / dyspnea or wheezing   Quantity:  1 Inhaler   Refills:  11       magnesium oxide 400 MG tablet   Commonly known as:  MAG-OX   Used for:  Hypomagnesemia        Dose:  400 mg   Take 1 tablet (400 mg) by mouth daily   Quantity:  30 tablet   Refills:  11       metoprolol tartrate 25 MG tablet   Commonly known as:  LOPRESSOR   Used for:  Paroxysmal atrial fibrillation (H)        Dose:  25 mg   Take 1 tablet (25 mg) by mouth 2 times daily   Quantity:  60 tablet   Refills:  11       multivitamin, therapeutic with minerals Tabs tablet   Used for:  Lung transplant recipient (H)        Dose:  1 tablet   Take 1 tablet by mouth daily   Quantity:  30 each   Refills:  11       mycophenolate 250 MG capsule   Commonly known as:  GENERIC EQUIVALENT   Used for:  S/P lung transplant (H)        Dose:  1500 mg   Take 6 capsules (1,500 mg) by mouth 2 times daily   Quantity:  360 capsule   Refills:  11       nystatin 981499 UNIT/ML suspension   Commonly known as:  MYCOSTATIN   Used for:  Lung transplant recipient (H)        Dose:  10 mL   Take 10 mLs (1,000,000 Units) by mouth 4 times daily   Quantity:  1200 mL   Refills:  5       pantoprazole 40 MG EC tablet   Commonly known as:  PROTONIX   Used for:  Gastroesophageal reflux disease without esophagitis        Dose:  40 mg   Take 1 tablet (40 mg) by mouth every morning   Quantity:  30 tablet   Refills:  11       pentoxifylline 400 MG CR tablet   Commonly known as:  TRENtal   Used for:  Lung transplant recipient (H)        Dose:  400 mg   Take 1 tablet (400 mg) by mouth 2 times daily   Quantity:  60 tablet   Refills:  3       *  predniSONE 2.5 MG tablet   Commonly known as:  DELTASONE   Used for:  Lung replaced by transplant (H)        Dose:  2.5 mg   Take 1 tablet (2.5 mg) by mouth daily 6/22/18             7.5                    7.5 7/20/18             7.5                    5 8/24/18             5                       5 9/21/18             5                       2.5   Quantity:  90 tablet   Refills:  1       * predniSONE 5 MG tablet   Commonly known as:  DELTASONE   Used for:  S/P lung transplant (H)        Follow taper card   Quantity:  180 tablet   Refills:  3       sulfamethoxazole-trimethoprim 400-80 MG per tablet   Commonly known as:  BACTRIM/SEPTRA   Indication:  Preventative Medication Therapy Used Around Surgery   Used for:  Organ transplant candidate, Lung disease, interstitial (H), Abnormal chest CT, Hypotension, unspecified hypotension type, Acute on chronic respiratory failure with hypoxia (H), ILD (interstitial lung disease) (H)        Dose:  1 tablet   1 tablet by Oral or NG Tube route daily   Quantity:  30 tablet   Refills:  11       tacrolimus 1 MG capsule   Commonly known as:  GENERIC EQUIVALENT   Used for:  S/P lung transplant (H), Other constipation        Take 5 mg in the AM and 6 mg in the PM   Quantity:  330 capsule   Refills:  11       warfarin 1 MG tablet   Commonly known as:  COUMADIN   Used for:  DVT (deep venous thrombosis) (H), Status post lung transplantation (H)        Take 4mg MWFSat and 6mg TuThSun, or as directed by the Medication Monitoring [facility] at the [facility].   Quantity:  140 tablet   Refills:  3       * Notice:  This list has 2 medication(s) that are the same as other medications prescribed for you. Read the directions carefully, and ask your doctor or other care provider to review them with you.             Protect others around you: Learn how to safely use, store and throw away your medicines at www.disposemymeds.org.             Medication List: This is a list of all your medications and when to  take them. Check marks below indicate your daily home schedule. Keep this list as a reference.      Medications           Morning Afternoon Evening Bedtime As Needed    ACETAMINOPHEN PO   Take 1,000 mg by mouth every 6 hours as needed for pain                                albuterol 108 (90 Base) MCG/ACT Inhaler   Commonly known as:  PROAIR HFA/PROVENTIL HFA/VENTOLIN HFA   Inhale 2 puffs into the lungs every 6 hours as needed for shortness of breath / dyspnea or wheezing                                calcium-vitamin D 600-400 MG-UNIT per tablet   Commonly known as:  CALTRATE   Take 1 tablet by mouth 2 times daily (with meals)                                enoxaparin 60 MG/0.6ML injection   Commonly known as:  LOVENOX   Inject 60 mg subcutaneously every 12 hours or as directed by Anticoagulation Clinic.                                gabapentin 100 MG capsule   Commonly known as:  NEURONTIN   Take 1 capsule (100 mg) by mouth 2 times daily                                levalbuterol 45 MCG/ACT Inhaler   Commonly known as:  XOPENEX HFA   Inhale 2 puffs into the lungs every 6 hours as needed for shortness of breath / dyspnea or wheezing                                magnesium oxide 400 MG tablet   Commonly known as:  MAG-OX   Take 1 tablet (400 mg) by mouth daily                                metoprolol tartrate 25 MG tablet   Commonly known as:  LOPRESSOR   Take 1 tablet (25 mg) by mouth 2 times daily                                multivitamin, therapeutic with minerals Tabs tablet   Take 1 tablet by mouth daily                                mycophenolate 250 MG capsule   Commonly known as:  GENERIC EQUIVALENT   Take 6 capsules (1,500 mg) by mouth 2 times daily                                nystatin [id number] UNIT/ML suspension   Commonly known as:  MYCOSTATIN   Take 10 mLs (1,000,000 Units) by mouth 4 times daily                                pantoprazole 40 MG EC tablet   Commonly known as:  PROTONIX   Take 1  tablet (40 mg) by mouth every morning                                pentoxifylline 400 MG CR tablet   Commonly known as:  TRENtal   Take 1 tablet (400 mg) by mouth 2 times daily                                * predniSONE 2.5 MG tablet   Commonly known as:  DELTASONE   Take 1 tablet (2.5 mg) by mouth daily 6/22/18             7.5                    7.5 7/20/18             7.5                    5 8/24/18             5                       5 9/21/18             5                       2.5                                * predniSONE 5 MG tablet   Commonly known as:  DELTASONE   Follow taper card                                sulfamethoxazole-trimethoprim 400-80 MG per tablet   Commonly known as:  BACTRIM/SEPTRA   1 tablet by Oral or NG Tube route daily                                tacrolimus 1 MG capsule   Commonly known as:  GENERIC EQUIVALENT   Take 5 mg in the AM and 6 mg in the PM                                warfarin 1 MG tablet   Commonly known as:  COUMADIN   Take 4mg MWFSat and 6mg TuThSun, or as directed by the Medication Monitoring Clinic at the [facility].                                * Notice:  This list has 2 medication(s) that are the same as other medications prescribed for you. Read the directions carefully, and ask your doctor or other care provider to review them with you.              More Information        Enoxaparin Sodium Solution for injection  What is this medicine?  ENOXAPARIN (ee nox a PA rin) is used after knee, hip, or abdominal surgeries to prevent blood clotting. It is also used to treat existing blood clots in the lungs or in the veins.  This medicine may be used for other purposes; ask your health care provider or pharmacist if you have questions.  What should I tell my health care provider before I take this medicine?  They need to know if you have any of these conditions:    bleeding disorders, hemorrhage, or hemophilia    infection of the heart or heart valves    kidney or  liver disease    previous stroke    prosthetic heart valve    recent surgery or delivery of a baby    ulcer in the stomach or intestine, diverticulitis, or other bowel disease    an unusual or allergic reaction to enoxaparin, heparin, pork or pork products, other medicines, foods, dyes, or preservatives    pregnant or trying to get pregnant    breast-feeding  How should I use this medicine?  This medicine is for injection under the skin. It is usually given by a health-care professional. You or a family member may be trained on how to give the injections. If you are to give yourself injections, make sure you understand how to use the syringe, measure the dose if necessary, and give the injection. To avoid bruising, do not rub the site where this medicine has been injected. Do not take your medicine more often than directed. Do not stop taking except on the advice of your doctor or health care professional.  Make sure you receive a puncture-resistant container to dispose of the needles and syringes once you have finished with them. Do not reuse these items. Return the container to your doctor or health care professional for proper disposal.  Talk to your pediatrician regarding the use of this medicine in children. Special care may be needed.  Overdosage: If you think you have taken too much of this medicine contact a poison control center or emergency room at once.  NOTE: This medicine is only for you. Do not share this medicine with others.  What if I miss a dose?  If you miss a dose, take it as soon as you can. If it is almost time for your next dose, take only that dose. Do not take double or extra doses.  What may interact with this medicine?    aspirin and aspirin-like medicines    certain medicines that treat or prevent blood clots    dipyridamole    NSAIDs, medicines for pain and inflammation, like ibuprofen or naproxen  This list may not describe all possible interactions. Give your health care provider a  list of all the medicines, herbs, non-prescription drugs, or dietary supplements you use. Also tell them if you smoke, drink alcohol, or use illegal drugs. Some items may interact with your medicine.  What should I watch for while using this medicine?  Visit your doctor or health care professional for regular checks on your progress. Your condition will be monitored carefully while you are receiving this medicine.  Notify your doctor or health care professional and seek emergency treatment if you develop breathing problems; changes in vision; chest pain; severe, sudden headache; pain, swelling, warmth in the leg; trouble speaking; sudden numbness or weakness of the face, arm, or leg. These can be signs that your condition has gotten worse.  If you are going to have surgery, tell your doctor or health care professional that you are taking this medicine.  Do not stop taking this medicine without first talking to your doctor. Be sure to refill your prescription before you run out of medicine.  Avoid sports and activities that might cause injury while you are using this medicine. Severe falls or injuries can cause unseen bleeding. Be careful when using sharp tools or knives. Consider using an electric razor. Take special care brushing or flossing your teeth. Report any injuries, bruising, or red spots on the skin to your doctor or health care professional.  What side effects may I notice from receiving this medicine?  Side effects that you should report to your doctor or health care professional as soon as possible:    allergic reactions like skin rash, itching or hives, swelling of the face, lips, or tongue    feeling faint or lightheaded, falls    signs and symptoms of bleeding such as bloody or black, tarry stools; red or dark-brown urine; spitting up blood or brown material that looks like coffee grounds; red spots on the skin; unusual bruising or bleeding from the eye, gums, or nose  Side effects that usually do  not require medical attention (report to your doctor or health care professional if they continue or are bothersome):    pain, redness, or irritation at site where injected  This list may not describe all possible side effects. Call your doctor for medical advice about side effects. You may report side effects to FDA at 8-879-FBC-8937.  Where should I keep my medicine?  Keep out of the reach of children.  Store at room temperature between 15 and 30 degrees C (59 and 86 degrees F). Do not freeze. If your injections have been specially prepared, you may need to store them in the refrigerator. Ask your pharmacist. Throw away any unused medicine after the expiration date.  NOTE:This sheet is a summary. It may not cover all possible information. If you have questions about this medicine, talk to your doctor, pharmacist, or health care provider. Copyright  2016 Gold Standard

## 2018-07-10 NOTE — ANESTHESIA PREPROCEDURE EVALUATION
Anesthesia Evaluation     . Pt has had prior anesthetic.     History of anesthetic complications   - PONV        ROS/MED HX    ENT/Pulmonary: Comment: ILD s/p B lung txp      Neurologic:       Cardiovascular: Comment: S/p several ECMO cannulations and decannulations    TTE 3/2018:  LVEF 5-70%  RVnl    Hx of acute LV failure, but now with preserved EF    (+) hypertension----. : . . . :. . pulmonary hypertension (2/2 ILD (now s/p B lung txp)),       METS/Exercise Tolerance:     Hematologic:     (+) History of blood clots (IJ blood clot on coumadin, transitioned to lovenox for procedure today) -      Musculoskeletal:         GI/Hepatic:         Renal/Genitourinary:         Endo:         Psychiatric:         Infectious Disease:         Malignancy:         Other: Comment: Rheumatoid arthritis; Arron's                                   Anesthesia Plan      History & Physical Review  History and physical reviewed and following examination; no interval change.    ASA Status:  3 .    NPO Status:  > 8 hours    Plan for General and ETT with Intravenous induction. Maintenance will be Other.           Postoperative Care      Consents  Anesthetic plan, risks, benefits and alternatives discussed with:  Spouse and Patient.  Use of blood products discussed: No .   .

## 2018-07-10 NOTE — OR NURSING
Dr. Lozano states not to hold for PACU orders. He also stated his colleague will put in the orders.

## 2018-07-10 NOTE — PROCEDURES
Procedure(s):    Bronchoscopy  Balloon Dilation (1 sites)  Bronchial wash (1 site)    Indication:  S/p BLT and right anastomosis, s/p right anastomosis dilation    Attending of Record:     DA Lin MD    Trainees Present:   Jacinto Jones MD    Medications:    General Anesthesia - See anesthesia flowsheet for details    Sedation Time:  Per Anesthesia Care Provider    Time Out:  Performed    The patient's medical record has been reviewed.  The indication for the procedure was reviewed.  The necessary history and physical examination was performed and reviewed.  The risks, benefits and alternatives of the procedure were discussed with the the patient in detail and she had the opportunity to ask questions.  I discussed in particular the potential complications including risks of minor or life-threatening bleeding and/or infection, respiratory failure, vocal cord trauma / paralysis, pneumothorax, and discomfort. Sedation risks were also discussed including abnormal heart rhythms, low blood pressure, and respiratory failure. All questions were answered to the best of my ability.  Verbal and written informed consent was obtained.  The proposed procedure and the patient's identification were verified prior to the procedure by the physician and the nurse.    The patient was assessed for the adequacy for the procedure and to receive medications.   Mental Status:  Alert and oriented x 3  Airway examination:  Class I (Complete visualization of soft palate)  Pulmonary:  Clear to ausculation bilaterally  CV:  RRR, no murmurs or gallops  ASA Grade:  (II)  Mild systemic disease    After clinical evaluation and reviewing the indication, risks, alternatives and benefits of the procedure the patient was deemed to be in satisfactory condition to undergo the procedure.        A Tuberculosis risk assessment was performed:  The patient has no known RISK of Tuberculosis    The procedure was performed in a negative airflow  room: No. The reason the patient could not be moved to a negative airflow room was no TB risk  Maneuvers / Procedure:     The bronchoscope was inserted through the mouth via ETT.      Airway Examination:  A complete airway examination was performed from the distal trachea to the subsegmental level in each lobe of both lungs.  Pertinent findings include right anastomosis stenosis ~ 2-3 mm filled w secretions, BI bronchomalacia.               Balloon Dilation: After secretions in the right main stenotic area suctioned and did bronchial washing, by using a therapeutic scope and Elation balloons 8-10 mm used and the balloon inflated 30-60 seconds at each mm while holding breath. There was minimal posterior wall tear that is expected from the dilation    Bronchial washing: performed in the right main stenosis, bronchus intermedius and RLL. Thick secretions suctioned w help of saline and collected for cultures.    Main gee      R anastomosis      R anastomosis      L anastomosis      KONRAD      LLL      R anastomosis after dilation up to 10 mm      RUL      BI post dilation      RML and RLL      Bronchomalacia in BI with gentle suctioning           Any disposable equipment was visually inspected and deemed to be intact immediately post procedure.      Recommendations:     -->  Home  -->  Repeat dilation in 3-4 weeks

## 2018-07-10 NOTE — MR AVS SNAPSHOT
Kecia Blue   7/10/2018   Anticoagulation Therapy Visit    Description:  57 year old female   Provider:  Ofe Redmond, RN   Department:  [facility]           INR as of 7/10/2018     Today's INR No new INR was available at the time of this encounter.      Anticoagulation Summary as of 7/10/2018     INR goal 2.0-3.0   Today's INR No new INR was available at the time of this encounter.   Full warfarin instructions 7/10: Hold; 7/11: Hold; 7/12: Hold; Otherwise No maintenance plan   Next INR check 7/13/2018    Indications   Lung transplant recipient (H) [Z94.2]  Deep vein thrombosis (DVT) (H) [I82.409] [I82.409]         July 2018 Details    Sun Mon Tue Wed Thu Fri Sat     1               2               3               4               5               6               7                 8               9               10      Hold   See details      11      Hold         12      Hold         13            14                 15               16               17               18               19               20               21                 22               23               24               25               26               27               28                 29               30               31                    Date Details   07/10 This INR check       Date of next INR:  7/13/2018         How to take your warfarin dose     Hold Do not take your warfarin dose. See the Details table to the right for additional instructions.

## 2018-07-10 NOTE — OR NURSING
Family at bedside. DC instructions reviewed. Pt tolerating food and drink. Dr Lin paged because pt would like pictures from her procedure. Black and white pictures printed off from pt's chart.

## 2018-07-10 NOTE — PROGRESS NOTES
"      Pulmonary Medicine  Post - Lung Transplant Daily Clinic Note   July 9, 2018       Patient: Kecia Blue  MRN: [id number]  Transplant Date: 2/21/2018 (POD# 131)           Assessment and Plan:     Kecia Blue is a 57 year old female with a PMHx significant for dermatomyositis, seronegative rheumatoid arthritis, ILD, and pulmonary hypertension s/p bilateral sequential lung transplant on 2/21/2018, POD# 131.     Problem List:  1. S/p lung transplant   - continue with standard 3-drug immunosuppression with tac, pred, MMF and transplant ppx with Bactrim for PJP and Valcyte for CMV (Donor   - will f/u tac level (goal 10 - 15) and dose adjust accordingly  2. Right main bronchial stenosis s/p dilatation. Her stridor is likely due to re-stenosis of the RMB   - surveillance bronch for evaluation and treatment (possilble redilatation)  3. Left Upper extremity and Right IJ thrombus in the setting of ECMO catherization on coumadin anticoagulation with INR goal 2 - 3 for duration of 6 months   - currently on bridging with Lovenox in anticipation of bronchoscopy  4. Dermatomyositis with Raynaud's Phenomenon   - encourage f/u with Rheumotology  5. Hypertension well controlled on metoprolol 25 mg bid   - have discussed with patient about switching to a once a day formulation but she does not want to \"rock the boat\"  6. Dsyphonia, ENT referral closer to home will be arranged    - encourage daily physical activity as tolerated (recommended pool therapy if she has difficulty weight bearing on left knee)  - encourage f/u with PCP when returns to home with age- and gender-specific health care maintenance including yearly dermatologic examination    RTC in 4 weeks      Complexity indicators:     --immune compromised, on high-risk medications X   --organ transplant recipient X   --multiple organ transplant recipient     --active respiratory infection     --within one year of transplant; and/or within one month of " "hospitalization X   --chronic lung allograft dysfunction syndrome (CLAD, chronic rejection, or bronchiolitis obliterans syndrome)     --new medical problem addressed during this visit     --multiple active medical problems    --admitted directly to hospital from this clinic visit     -->50% of this visit was spent in counseling and care coordination. If yes, total visit time was         Alex Hale MD, [facility]   of Medicine  Pulmonary, Critical Care and Sleep Medicine  [facility]  Pager: [phone]            Subjective & Interval History:     Last 24 hours of care team notes reviewed.    Patient presents today for preoperative visits. Her  was also in attendance. The patient is scheduled for surveillance bronchoscopy with possible bronchial dilatation for bronchial stenosis. She presents today with complaint of auditory stridor worsening when lying flat. She denies any change in her dyspnea or exercise tolerance. But admits she is not a physically active as before due an injury to her knee worsened by pulmonary rehab.  She denies fevers, chills, chronic cough, or other URI symptoms. She states she is tolerating her transplant medications okay. She reports that overall she is doing great. She reports good po fluid intake, \"I have an micki!\"           Review of Systems:      ROS: 10 point ROS neg other than the symptoms noted above in the HPI.          Medications:     No current facility-administered medications for this visit.      No current outpatient prescriptions on file.     Facility-Administered Medications Ordered in Other Visits   Medication     dexamethasone (DECADRON) injection     fentaNYL (PF) (SUBLIMAZE) injection 25-50 mcg     lactated ringers infusion     lidocaine injection 2% (MDV)     meperidine (DEMEROL) injection 12.5 mg     naloxone (NARCAN) injection 0.1-0.4 mg     ondansetron (ZOFRAN-ODT) ODT tab 4 mg    Or     ondansetron (ZOFRAN) injection 4 mg     " "ondansetron (ZOFRAN) injection     ORAL Pain Medications -  may administer as ordered by surgeon for take home use     PRE OP antibiotics NOT needed for this surgical procedure.     prochlorperazine (COMPAZINE) injection 10 mg     propofol (DIPRIVAN) infusion     propofol (DIPRIVAN) injection 10 mg/mL vial     rocuronium (ZEMURON) injection            Physical Exam:       GEN: Awake and alert, in no acute distress, sitting upright in chair. Pleasant and cooperative  HEENT: Pupils equal and reactive to light, conjunctiva are pink conjunctivae, sclera anicteric,  Oral mucosa moist without lesions.  NECK: supple no JVD/LAD  PULM: Good air flow bilaterally, symmetric in expansion, Scattered crackles to lower lobes. No rhonchi, no wheezes. Breathing non-labored.  CV: Normal S1 and S2. RRR.  No murmur, gallop, or rub.  No peripheral edema.  Peripheral pulses intact.   ABD: Soft, nontender, nondistended. Bowel sound normoactive, no guarding, no rebound.   MSK: .  No apparent muscle wasting. No clubbing, or edema (+) acrocyanosis of fingers and toes with poor capillary refill, left knee warm, swollen and tender to palpation but no signs of cellulitis  NEURO: Alert and oriented to person, place, and time, motor 5/5 upper/lower extremity b/l, sensation grossly intact to touch, gait normal  SKIN: Warm and dry. No visible rashes or lesions   PSYCH: Mood stable, judgment and insight appropriate.              Data:     All vital signs, laboratory and imaging data for the past 24 hours reviewed.      Vital signs:                    Height: 162.6 cm (5' 4\") Weight: 54.3 kg (119 lb 11.2 oz)    Weight trend:   Vitals:    07/09/18 1035   Weight: 54.3 kg (119 lb 11.2 oz)        Labs    Lab Results   Component Value Date    WBC 17.9 07/09/2018     Lab Results   Component Value Date    RBC 2.89 07/09/2018     Lab Results   Component Value Date    HGB 8.7 07/09/2018     Lab Results   Component Value Date    HCT 29.2 07/09/2018     No " components found for: MCT  Lab Results   Component Value Date     07/09/2018     Lab Results   Component Value Date    MCH 30.1 07/09/2018     Lab Results   Component Value Date    MCHC 29.8 07/09/2018     Lab Results   Component Value Date    RDW 13.2 07/09/2018     Lab Results   Component Value Date     07/09/2018     Last Comprehensive Metabolic Panel:  Sodium   Date Value Ref Range Status   07/09/2018 137 133 - 144 mmol/L Final     Potassium   Date Value Ref Range Status   07/09/2018 4.2 3.4 - 5.3 mmol/L Final     Chloride   Date Value Ref Range Status   07/09/2018 101 94 - 109 mmol/L Final     Carbon Dioxide   Date Value Ref Range Status   07/09/2018 28 20 - 32 mmol/L Final     Anion Gap   Date Value Ref Range Status   07/09/2018 8 3 - 14 mmol/L Final     Glucose   Date Value Ref Range Status   07/09/2018 166 (H) 70 - 99 mg/dL Final     Urea Nitrogen   Date Value Ref Range Status   07/09/2018 16 7 - 30 mg/dL Final     Creatinine   Date Value Ref Range Status   07/09/2018 1.01 0.52 - 1.04 mg/dL Final     GFR Estimate   Date Value Ref Range Status   07/09/2018 57 (L) >60 mL/min/1.7m2 Final     Comment:     Non  GFR Calc     Calcium   Date Value Ref Range Status   07/09/2018 9.2 8.5 - 10.1 mg/dL Final     Bilirubin Total   Date Value Ref Range Status   07/09/2018 0.2 0.2 - 1.3 mg/dL Final     Alkaline Phosphatase   Date Value Ref Range Status   07/09/2018 95 40 - 150 U/L Final     ALT   Date Value Ref Range Status   07/09/2018 23 0 - 50 U/L Final     AST   Date Value Ref Range Status   07/09/2018 16 0 - 45 U/L Final         Lab Results   Component Value Date    FLUAH1 Negative 04/10/2018    FLUAH3 Negative 04/10/2018    HA2711 Negative 04/10/2018    IFLUB Negative 04/10/2018    RSVA Negative 04/10/2018    RSVB Negative 04/10/2018    PIV1 Negative 04/10/2018    PIV2 Negative 04/10/2018    PIV3 Negative 04/10/2018    HMPV Negative 04/10/2018    HRVS Negative 04/10/2018    ADVBE Negative  04/10/2018    ADVC Negative 04/10/2018    ADVC Negative 02/27/2018     Historical CMV results (last 3 of prior testing):  Lab Results   Component Value Date    CMVQNT 229 (A) 07/09/2018    CMVQNT CMV DNA Not Detected 06/05/2018    CMVQNT CMV DNA Not Detected 05/16/2018     Lab Results   Component Value Date    CMVLOG 2.4 (H) 07/09/2018    CMVLOG Not Calculated 06/05/2018    CMVLOG Not Calculated 05/16/2018     Urine Studies    Recent Labs   Lab Test  03/04/18   1425   URINEPH  5.5   NITRITE  Negative   LEUKEST  Negative   WBCU  5       CXR 2V 7/9/2018 report and film personally reviewed by me  Impression:   1. Largely unchanged radiograph of bilateral lung transplant.    PFT interpretation 7/9/2018: valid and meets ATS guidelines  1. FVC is 1.49L (46%)  2. FEV1 is 0.93 (35%) c/w severe obstruction  3. FEV1/FVC 62  Impression: her FEV1 in improved by 200 ml in comparions to her last FEV1 performed on 6/5/18 which is a significant increase. However it is significantly below her post-LTX best FEV1 of 1.25L on 4/5/18    Patient was seen and examined by me. I personally reviewed the electronic medical record including labs, flowsheets, imaging reports and films, vitals, and medications.    Clinical update was provided to the patient and her .I answered all of their questions and provided them support    Alex Hale MD, [facility]   of Medicine  Pulmonary, Critical Care and Sleep Medicine  [facility]  Pager: [phone]

## 2018-07-10 NOTE — IP AVS SNAPSHOT
Same Day Surgery [address] [address]    Phone:  [phone]                                       After Visit Summary   7/10/2018    Kecia Blue    MRN: [id number]           After Visit Summary Signature Page     I have received my discharge instructions, and my questions have been answered. I have discussed any challenges I see with this plan with the nurse or doctor.    ..........................................................................................................................................  Patient/Patient Representative Signature      ..........................................................................................................................................  Patient Representative Print Name and Relationship to Patient    ..................................................               ................................................  Date                                            Time    ..........................................................................................................................................  Reviewed by Signature/Title    ...................................................              ..............................................  Date                                                            Time

## 2018-07-11 ENCOUNTER — TELEPHONE (OUTPATIENT)
Dept: PULMONOLOGY | Facility: CLINIC | Age: 58
End: 2018-07-11

## 2018-07-11 ENCOUNTER — HOSPITAL ENCOUNTER (OUTPATIENT)
Facility: CLINIC | Age: 58
End: 2018-07-11
Attending: INTERNAL MEDICINE | Admitting: INTERNAL MEDICINE
Payer: COMMERCIAL

## 2018-07-11 DIAGNOSIS — Z94.2 LUNG TRANSPLANT RECIPIENT (H): ICD-10-CM

## 2018-07-11 DIAGNOSIS — Z79.899 ENCOUNTER FOR LONG-TERM (CURRENT) USE OF HIGH-RISK MEDICATION: Primary | ICD-10-CM

## 2018-07-11 LAB
CMV DNA SPEC NAA+PROBE-ACNC: 9324 [IU]/ML
CMV DNA SPEC NAA+PROBE-ACNC: ABNORMAL [IU]/ML
CMV DNA SPEC NAA+PROBE-LOG#: 4 {LOG_IU}/ML
CMV DNA SPEC NAA+PROBE-LOG#: 4.3 {LOG_IU}/ML
DONOR IDENTIFICATION: NORMAL
DQB7: 1576
DSA COMMENTS: NORMAL
DSA PRESENT: YES
DSA TEST METHOD: NORMAL
FLUAV H1 2009 PAND RNA SPEC QL NAA+PROBE: NEGATIVE
FLUAV H1 2009 PAND RNA SPEC QL NAA+PROBE: NEGATIVE
FLUAV H1 RNA SPEC QL NAA+PROBE: NEGATIVE
FLUAV H1 RNA SPEC QL NAA+PROBE: NEGATIVE
FLUAV H3 RNA SPEC QL NAA+PROBE: NEGATIVE
FLUAV H3 RNA SPEC QL NAA+PROBE: NEGATIVE
FLUAV RNA SPEC QL NAA+PROBE: NEGATIVE
FLUAV RNA SPEC QL NAA+PROBE: NEGATIVE
FLUBV RNA SPEC QL NAA+PROBE: NEGATIVE
FLUBV RNA SPEC QL NAA+PROBE: NEGATIVE
HADV DNA SPEC QL NAA+PROBE: NEGATIVE
HMPV RNA SPEC QL NAA+PROBE: NEGATIVE
HMPV RNA SPEC QL NAA+PROBE: NEGATIVE
HPIV1 RNA SPEC QL NAA+PROBE: NEGATIVE
HPIV1 RNA SPEC QL NAA+PROBE: NEGATIVE
HPIV2 RNA SPEC QL NAA+PROBE: NEGATIVE
HPIV2 RNA SPEC QL NAA+PROBE: NEGATIVE
HPIV3 RNA SPEC QL NAA+PROBE: NEGATIVE
HPIV3 RNA SPEC QL NAA+PROBE: NEGATIVE
MICROBIOLOGIST REVIEW: NORMAL
MICROBIOLOGIST REVIEW: NORMAL
ORGAN: NORMAL
RHINOVIRUS RNA SPEC QL NAA+PROBE: NEGATIVE
RHINOVIRUS RNA SPEC QL NAA+PROBE: NEGATIVE
RSV RNA SPEC QL NAA+PROBE: NEGATIVE
SA1 CELL: NORMAL
SA1 COMMENTS: NORMAL
SA1 HI RISK ABY: NORMAL
SA1 MOD RISK ABY: NORMAL
SA1 TEST METHOD: NORMAL
SA2 CELL: NORMAL
SA2 COMMENTS: NORMAL
SA2 HI RISK ABY UA: NORMAL
SA2 MOD RISK ABY: NORMAL
SA2 TEST METHOD: NORMAL
SPECIMEN SOURCE: ABNORMAL
SPECIMEN SOURCE: ABNORMAL
SPECIMEN SOURCE: NORMAL
SPECIMEN SOURCE: NORMAL

## 2018-07-11 NOTE — TELEPHONE ENCOUNTER
Spoke with patient to schedule procedure with Dr. Alana Lin    Procedure was scheduled on 08/02/2018 at 12:40pm  Patient will have H&P with Pulmonary, [facility]  Patient is aware a / is needed day of surgery.   Surgery letter was sent via [email or web address], patient has my direct contact information for any further questions.

## 2018-07-11 NOTE — ANESTHESIA CARE TRANSFER NOTE
Patient: Kecia Blue    Procedure(s):  Flexible Bronchoscopy, Balloon Dilation, Bronchial Washings   - Wound Class: II-Clean Contaminated    Diagnosis: Bronchial Stenosis   Diagnosis Additional Information: No value filed.    Anesthesia Type:   General, ETT     Note:  Airway :Face Mask  Patient transferred to:PACU  Comments: Extubated in OR 15, transferred to PACU with oxygen, hemodynamically stable. Upon arrival to PACU, pain is well controlled, no nausea. SpO2 98% on 6L O2 via facemask.     Toby Estrella  Handoff Report: Identifed the Patient, Identified the Reponsible Provider, Reviewed the pertinent medical history, Discussed the surgical course, Reviewed Intra-OP anesthesia mangement and issues during anesthesia, Set expectations for post-procedure period and Allowed opportunity for questions and acknowledgement of understanding      Vitals: (Last set prior to Anesthesia Care Transfer)    CRNA VITALS  7/10/2018 1006 - 7/10/2018 1106      7/10/2018             Resp Rate (observed): (!)  2    Resp Rate (set): 10                Electronically Signed By: Toby Estrella MD  July 11, 2018  10:40 AM

## 2018-07-12 ENCOUNTER — TELEPHONE (OUTPATIENT)
Dept: TRANSPLANT | Facility: CLINIC | Age: 58
End: 2018-07-12

## 2018-07-12 ENCOUNTER — ANTICOAGULATION THERAPY VISIT (OUTPATIENT)
Dept: ANTICOAGULATION | Facility: CLINIC | Age: 58
End: 2018-07-12

## 2018-07-12 DIAGNOSIS — I82.622 ACUTE DEEP VEIN THROMBOSIS (DVT) OF LEFT UPPER EXTREMITY, UNSPECIFIED VEIN (H): ICD-10-CM

## 2018-07-12 DIAGNOSIS — Z94.2 LUNG REPLACED BY TRANSPLANT (H): Primary | ICD-10-CM

## 2018-07-12 DIAGNOSIS — Z94.2 LUNG TRANSPLANT RECIPIENT (H): ICD-10-CM

## 2018-07-12 LAB
ACID FAST STN SPEC QL: NORMAL
BACTERIA SPEC CULT: NORMAL
BACTERIA SPEC CULT: NORMAL
SPECIMEN SOURCE: NORMAL

## 2018-07-12 NOTE — MR AVS SNAPSHOT
Kecia Blue   7/12/2018   Anticoagulation Therapy Visit    Description:  57 year old female   Provider:  Joshua Davis, RN   Department:  [facility]           INR as of 7/12/2018     Today's INR No new INR was available at the time of this encounter.      Anticoagulation Summary as of 7/12/2018     INR goal 2.0-3.0   Today's INR No new INR was available at the time of this encounter.   Full warfarin instructions 7/12: 6 mg; 7/13: 6 mg; 7/14: 4 mg; 7/15: 4 mg; Otherwise No maintenance plan   Next INR check 7/16/2018    Indications   Lung transplant recipient (H) [Z94.2]  Deep vein thrombosis (DVT) (H) [I82.409] [I82.409]         July 2018 Details    Sun Mon Tue Wed Thu Fri Sat     1               2               3               4               5               6               7                 8               9               10               11               12      6 mg   See details      13      6 mg         14      4 mg           15      4 mg         16            17               18               19               20               21                 22               23               24               25               26               27               28                 29               30               31                    Date Details   07/12 This INR check       Date of next INR:  7/16/2018         How to take your warfarin dose     To take:  4 mg Take 4 of the 1 mg tablets.    To take:  6 mg Take 6 of the 1 mg tablets.

## 2018-07-12 NOTE — Clinical Note
Update: Kecia had her Cortisone injection today 7/12.  Recommended she restart Coumadin and Lovenox this evening.

## 2018-07-12 NOTE — ADDENDUM NOTE
Encounter addended by: Pierce Alvarado on: 7/12/2018  3:26 PM<BR>     Actions taken: Sign clinical note, Episode resolved

## 2018-07-12 NOTE — TELEPHONE ENCOUNTER
Tacrolimus level 11.3 at 12 hours, on 7/9/18  Goal 10-15.   No change in dose.     Discussed with patient.      CMV serum positive, 229. Also positive from bronchial lavage. Discussed with patient. Plan to recheck CMV level tomorrow at local lab.

## 2018-07-12 NOTE — PROGRESS NOTES
ANTICOAGULATION FOLLOW-UP CLINIC VISIT    Patient Name:  Kecia Blue  Date:  7/12/2018  Contact Type:  Telephone    SUBJECTIVE:     Patient Findings     Positives Dental/Other procedures (Kecia had her Cortisone injection a day early (7/12).)    Comments Spoke to Kecia.  Instructed her to restart her Coumadin and Lovenox injections tonight 7/12.  Updated Anticoagulation tracking flowsheet.  Kecia does not need refill on Lovenox syringes.  Recommended 6mg of Coumadin today and tomorrow, 4mg Saturday and Sunday. Will check an INR on Monday.  Patient has next bronch scheduled for August 2nd.           OBJECTIVE    INR   Date Value Ref Range Status   07/09/2018 1.13 0.86 - 1.14 Final       ASSESSMENT / PLAN  No question data found.  Anticoagulation Summary as of 7/12/2018     INR goal 2.0-3.0   Today's INR No new INR was available at the time of this encounter.   Warfarin maintenance plan No maintenance plan   Full warfarin instructions 7/12: 6 mg; 7/13: 6 mg; 7/14: 4 mg; 7/15: 4 mg; Otherwise No maintenance plan   Next INR check 7/16/2018   Priority INR   Target end date     Indications   Lung transplant recipient (H) [Z94.2]  Deep vein thrombosis (DVT) (H) [I82.409] [I82.409]         Anticoagulation Episode Summary     INR check location Clinic Lab    Preferred lab     Send INR reminders to [facility]    Comments Plan for 3 months of therapy- Provoked DVTHIPPA OK and  Verbal OK to speak with spouse and leave msg @ [phone].  labs @ [facility] starting ~ 5/25/18.  fax: [phone]. ph:[phone] BRONCH PLAN NOTE 6/6/18      Anticoagulation Care Providers     Provider Role Specialty Phone number    Ame Chow PA-C Responsible Pulmonary [phone]            See the Encounter Report to view Anticoagulation Flowsheet and Dosing Calendar (Go to Encounters tab in chart review, and find the Anticoagulation Therapy Visit)    Spoke to patient.  Cortisone injection completed today  7/12.  Gave instructions and reviewed Lovenox injections.  Patient will check an INR on Monday.    Joshua Davis RN

## 2018-07-12 NOTE — PROGRESS NOTES
Pulmonary Rehab Respiratory Therapy Discharge Summary    Reason for discharge:    Discharged to home with outpatient therapy.    Progress towards goals:  Goals partially met.  Barriers to achieving goals:   discharge from facility.    Recommendation(s):    Continued therapy is recommended.  Rationale/Recommendations:  Continue with outpatient therapy at clinic closer to home..

## 2018-07-13 LAB
BACTERIA SPEC CULT: ABNORMAL
Lab: ABNORMAL
Lab: ABNORMAL
SPECIMEN SOURCE: ABNORMAL
SPECIMEN SOURCE: ABNORMAL

## 2018-07-16 ENCOUNTER — ANTICOAGULATION THERAPY VISIT (OUTPATIENT)
Dept: ANTICOAGULATION | Facility: CLINIC | Age: 58
End: 2018-07-16

## 2018-07-16 DIAGNOSIS — I82.622 ACUTE DEEP VEIN THROMBOSIS (DVT) OF LEFT UPPER EXTREMITY, UNSPECIFIED VEIN (H): ICD-10-CM

## 2018-07-16 DIAGNOSIS — Z94.2 LUNG TRANSPLANT RECIPIENT (H): ICD-10-CM

## 2018-07-16 LAB — INR PPP: 1.39

## 2018-07-16 NOTE — PROGRESS NOTES
ANTICOAGULATION FOLLOW-UP CLINIC VISIT    Patient Name:  Kecia Blue  Date:  7/16/2018  Contact Type:  Telephone    SUBJECTIVE:     Patient Findings     Positives Change in medications (continues on lovenox 60 mg BID until INR is therapeutic)           OBJECTIVE    INR   Date Value Ref Range Status   07/16/2018 1.39  Final       ASSESSMENT / PLAN  INR assessment SUB    Recheck INR In: 2 DAYS    INR Location Outside lab      Anticoagulation Summary as of 7/16/2018     INR goal 2.0-3.0   Today's INR 1.39!   Warfarin maintenance plan No maintenance plan   Full warfarin instructions 7/16: 6 mg; 7/17: 4 mg; Otherwise No maintenance plan   Next INR check 7/18/2018   Priority INR   Target end date     Indications   Lung transplant recipient (H) [Z94.2]  Deep vein thrombosis (DVT) (H) [I82.409] [I82.409]         Anticoagulation Episode Summary     INR check location Clinic Lab    Preferred lab     Send INR reminders to [facility]    Comments Plan for 3 months of therapy- Provoked DVTHIPPA OK and  Verbal OK to speak with spouse and leave msg @ [phone].  labs @ [facility] starting ~ 5/25/18.  fax: [phone]. ph:[phone] BRONCH PLAN NOTE 6/6/18      Anticoagulation Care Providers     Provider Role Specialty Phone number    Ame Chow PA-C Responsible Pulmonary [phone]            See the Encounter Report to view Anticoagulation Flowsheet and Dosing Calendar (Go to Encounters tab in chart review, and find the Anticoagulation Therapy Visit)    Spoke with Kecia.  She will continue on lovenox 60 mg BID and recheck INR on 7/18/18.  Next bronch is scheduled on 8/2/18.  Kecia reports she has enough lovenox syringes.    Anju Meyers, RN

## 2018-07-16 NOTE — MR AVS SNAPSHOT
Kecia Blue   7/16/2018   Anticoagulation Therapy Visit    Description:  57 year old female   Provider:  Anju Meyers, RN   Department:  [facility]           INR as of 7/16/2018     Today's INR 1.39!      Anticoagulation Summary as of 7/16/2018     INR goal 2.0-3.0   Today's INR 1.39!   Full warfarin instructions 7/16: 6 mg; 7/17: 4 mg; Otherwise No maintenance plan   Next INR check 7/18/2018    Indications   Lung transplant recipient (H) [Z94.2]  Deep vein thrombosis (DVT) (H) [I82.409] [I82.409]         July 2018 Details    Sun Mon Tue Wed Thu Fri Sat     1               2               3               4               5               6               7                 8               9               10               11               12               13               14                 15               16      6 mg   See details      17      4 mg         18            19               20               21                 22               23               24               25               26               27               28                 29               30               31                    Date Details   07/16 This INR check       Date of next INR:  7/18/2018         How to take your warfarin dose     To take:  4 mg Take 4 of the 1 mg tablets.    To take:  6 mg Take 6 of the 1 mg tablets.

## 2018-07-17 ENCOUNTER — TELEPHONE (OUTPATIENT)
Dept: TRANSPLANT | Facility: CLINIC | Age: 58
End: 2018-07-17

## 2018-07-17 DIAGNOSIS — Z94.2 STATUS POST LUNG TRANSPLANTATION (H): ICD-10-CM

## 2018-07-17 DIAGNOSIS — I82.409 DVT (DEEP VENOUS THROMBOSIS) (H): Primary | ICD-10-CM

## 2018-07-17 RX ORDER — WARFARIN SODIUM 1 MG/1
TABLET ORAL
Qty: 140 TABLET | Refills: 3 | Status: SHIPPED | OUTPATIENT
Start: 2018-07-17 | End: 2018-10-29

## 2018-07-17 NOTE — TELEPHONE ENCOUNTER
CMV increased to 802 on 7-13-18. Will repeat level again on Friday 7-20-18.  Spoke with patient and she denies any infectious symptoms at this time.  She is in agreement with plan.

## 2018-07-18 ENCOUNTER — ANTICOAGULATION THERAPY VISIT (OUTPATIENT)
Dept: ANTICOAGULATION | Facility: CLINIC | Age: 58
End: 2018-07-18

## 2018-07-18 DIAGNOSIS — I82.622 ACUTE DEEP VEIN THROMBOSIS (DVT) OF LEFT UPPER EXTREMITY, UNSPECIFIED VEIN (H): ICD-10-CM

## 2018-07-18 DIAGNOSIS — Z94.2 LUNG TRANSPLANT RECIPIENT (H): ICD-10-CM

## 2018-07-18 LAB — INR PPP: 1.84

## 2018-07-18 NOTE — MR AVS SNAPSHOT
Kecia Blue   7/18/2018   Anticoagulation Therapy Visit    Description:  57 year old female   Provider:  Katie Bush, RN   Department:  [facility]           INR as of 7/18/2018     Today's INR 1.84!      Anticoagulation Summary as of 7/18/2018     INR goal 2.0-3.0   Today's INR 1.84!   Full warfarin instructions 7/18: 6 mg; 7/19: 4 mg; Otherwise No maintenance plan   Next INR check 7/20/2018    Indications   Lung transplant recipient (H) [Z94.2]  Deep vein thrombosis (DVT) (H) [I82.409] [I82.409]         July 2018 Details    Sun Mon Tue Wed Thu Fri Sat     1               2               3               4               5               6               7                 8               9               10               11               12               13               14                 15               16               17               18      6 mg   See details      19      4 mg         20            21                 22               23               24               25               26               27               28                 29               30               31                    Date Details   07/18 This INR check       Date of next INR:  7/20/2018         How to take your warfarin dose     To take:  4 mg Take 4 of the 1 mg tablets.    To take:  6 mg Take 6 of the 1 mg tablets.

## 2018-07-18 NOTE — PROGRESS NOTES
ANTICOAGULATION FOLLOW-UP CLINIC VISIT    Patient Name:  Kecia Blue  Date:  7/18/2018  Contact Type:  Telephone    SUBJECTIVE:     Patient Findings     Comments Next bronch is scheduled for 8/2.  Patient will continue with Lovenox.            OBJECTIVE    INR   Date Value Ref Range Status   07/18/2018 1.84  Final       ASSESSMENT / PLAN  No question data found.  Anticoagulation Summary as of 7/18/2018     INR goal 2.0-3.0   Today's INR 1.84!   Warfarin maintenance plan No maintenance plan   Full warfarin instructions 7/18: 6 mg; 7/19: 4 mg; Otherwise No maintenance plan   Next INR check 7/20/2018   Priority INR   Target end date     Indications   Lung transplant recipient (H) [Z94.2]  Deep vein thrombosis (DVT) (H) [I82.409] [I82.409]         Anticoagulation Episode Summary     INR check location Clinic Lab    Preferred lab     Send INR reminders to [facility]    Comments Plan for 3 months of therapy- Provoked DVTHIPPA OK and  Verbal OK to speak with spouse and leave msg @ [phone].  labs @ [facility] starting ~ 5/25/18.  fax: [phone]. ph:[phone] BRONCH PLAN NOTE 6/6/18      Anticoagulation Care Providers     Provider Role Specialty Phone number    Ame Chow PA-C Responsible Pulmonary [phone]            See the Encounter Report to view Anticoagulation Flowsheet and Dosing Calendar (Go to Encounters tab in chart review, and find the Anticoagulation Therapy Visit)    Spoke with Kecia.     Katie Bush RN

## 2018-07-20 ENCOUNTER — ANTICOAGULATION THERAPY VISIT (OUTPATIENT)
Dept: ANTICOAGULATION | Facility: CLINIC | Age: 58
End: 2018-07-20

## 2018-07-20 DIAGNOSIS — Z94.2 LUNG REPLACED BY TRANSPLANT (H): ICD-10-CM

## 2018-07-20 DIAGNOSIS — I82.622 ACUTE DEEP VEIN THROMBOSIS (DVT) OF LEFT UPPER EXTREMITY, UNSPECIFIED VEIN (H): ICD-10-CM

## 2018-07-20 DIAGNOSIS — Z94.2 LUNG TRANSPLANT RECIPIENT (H): ICD-10-CM

## 2018-07-20 LAB — INR PPP: 2.09

## 2018-07-20 PROCEDURE — 80197 ASSAY OF TACROLIMUS: CPT | Performed by: INTERNAL MEDICINE

## 2018-07-20 NOTE — PROGRESS NOTES
ANTICOAGULATION FOLLOW-UP CLINIC VISIT    Patient Name:  Kecia Blue  Date:  7/20/2018  Contact Type:  Telephone    SUBJECTIVE:     Patient Findings     Comments Instructions to stop Lovenox 60mg BID and also next bronch is 8/2 please look at note 6/6 for previous holding plan. In-basket message sent to MERRICK Chilel to see if we are to continue to monitor pt or if we need to extend our orders. Will await for her reply            OBJECTIVE    INR   Date Value Ref Range Status   07/20/2018 2.09  Final     Comment:     Outside Lab        ASSESSMENT / PLAN  INR assessment THER    Recheck INR In: 5 DAYS    INR Location Outside lab      Anticoagulation Summary as of 7/20/2018     INR goal 2.0-3.0   Today's INR 2.09   Warfarin maintenance plan No maintenance plan   Full warfarin instructions 7/20: 6 mg; 7/21: 4 mg; 7/22: 4 mg; 7/23: 4 mg; 7/24: 4 mg; Otherwise No maintenance plan   Next INR check 7/25/2018   Priority INR   Target end date     Indications   Lung transplant recipient (H) [Z94.2]  Deep vein thrombosis (DVT) (H) [I82.409] [I82.409]         Anticoagulation Episode Summary     INR check location Clinic Lab    Preferred lab     Send INR reminders to [facility]    Comments Plan for 3 months of therapy- Provoked DVTHIPPA OK and  Verbal OK to speak with spouse and leave msg @ [phone].  labs @ [facility] starting ~ 5/25/18.  fax: [phone]. ph:[phone] BRONCH PLAN NOTE 6/6/18      Anticoagulation Care Providers     Provider Role Specialty Phone number    Ame hCow PA-C Responsible Pulmonary [phone]            See the Encounter Report to view Anticoagulation Flowsheet and Dosing Calendar (Go to Encounters tab in chart review, and find the Anticoagulation Therapy Visit)    Spoke with patient. Gave them their lab results and new warfarin recommendation.  No changes in health, medication, or diet. No missed doses, no falls. No signs or symptoms of bleed or clotting.      Ofe Redmond, RN

## 2018-07-20 NOTE — MR AVS SNAPSHOT
Kecia Blue   7/20/2018   Anticoagulation Therapy Visit    Description:  57 year old female   Provider:  Ofe Redmond, RN   [facility]           INR as of 7/20/2018     Today's INR 2.09      Anticoagulation Summary as of 7/20/2018     INR goal 2.0-3.0   Today's INR 2.09   Full warfarin instructions 7/20: 6 mg; 7/21: 4 mg; 7/22: 4 mg; 7/23: 4 mg; 7/24: 4 mg; Otherwise No maintenance plan   Next INR check 7/25/2018    Indications   Lung transplant recipient (H) [Z94.2]  Deep vein thrombosis (DVT) (H) [I82.409] [I82.409]         July 2018 Details    Sun Mon Tue Wed Thu Fri Sat     1               2               3               4               5               6               7                 8               9               10               11               12               13               14                 15               16               17               18               19               20      6 mg   See details      21      4 mg           22      4 mg         23      4 mg         24      4 mg         25            26               27               28                 29               30               31                    Date Details   07/20 This INR check       Date of next INR:  7/25/2018         How to take your warfarin dose     To take:  4 mg Take 4 of the 1 mg tablets.    To take:  6 mg Take 6 of the 1 mg tablets.

## 2018-07-22 LAB
TACROLIMUS BLD-MCNC: 8.4 UG/L (ref 5–15)
TME LAST DOSE: NORMAL H

## 2018-07-23 ENCOUNTER — TELEPHONE (OUTPATIENT)
Dept: TRANSPLANT | Facility: CLINIC | Age: 58
End: 2018-07-23

## 2018-07-23 DIAGNOSIS — E83.42 HYPOMAGNESEMIA: ICD-10-CM

## 2018-07-23 DIAGNOSIS — Z94.2 S/P LUNG TRANSPLANT (H): ICD-10-CM

## 2018-07-23 DIAGNOSIS — K59.09 OTHER CONSTIPATION: ICD-10-CM

## 2018-07-23 RX ORDER — TACROLIMUS 0.5 MG/1
0.5 CAPSULE ORAL EVERY MORNING
Qty: 30 CAPSULE | Refills: 11 | Status: SHIPPED | OUTPATIENT
Start: 2018-07-23 | End: 2018-08-23

## 2018-07-23 RX ORDER — MAGNESIUM OXIDE 400 MG/1
400 TABLET ORAL DAILY
Qty: 90 TABLET | Refills: 3 | Status: SHIPPED | OUTPATIENT
Start: 2018-07-23 | End: 2019-06-19

## 2018-07-23 RX ORDER — TACROLIMUS 1 MG/1
CAPSULE ORAL
Qty: 330 CAPSULE | Refills: 11 | Status: SHIPPED | OUTPATIENT
Start: 2018-07-23 | End: 2018-10-12

## 2018-07-23 NOTE — TELEPHONE ENCOUNTER
Tacrolimus level 8.4 at 12 hours, on 7/20/18  Goal 10-12.   Current dose 5 mg in AM, 6 mg in PM    Dose changed to  5.5 mg in AM, 6 mg in PM   Recheck level in 7-10 days    Discussed with Kecia Lin message sent

## 2018-07-25 ENCOUNTER — TELEPHONE (OUTPATIENT)
Dept: TRANSPLANT | Facility: CLINIC | Age: 58
End: 2018-07-25

## 2018-07-25 DIAGNOSIS — B25.9 CYTOMEGALOVIRUS (CMV) VIREMIA (H): Primary | ICD-10-CM

## 2018-07-25 RX ORDER — VALGANCICLOVIR 450 MG/1
450 TABLET, FILM COATED ORAL DAILY
Qty: 30 TABLET | Refills: 6 | Status: SHIPPED | OUTPATIENT
Start: 2018-07-25 | End: 2018-08-01

## 2018-07-25 NOTE — TELEPHONE ENCOUNTER
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   Per Dr Hale, start Valcyte 900mg daily (48.8 CrCL).  Recheck labs next week while in our clinic and adjust Valcyte dose if renal function improved.  Kecia was instructed to get 60-70oz fluids in daily for hydration.    Kceia and  verbalized understanding of instructions and will implement.

## 2018-07-26 ENCOUNTER — ANTICOAGULATION THERAPY VISIT (OUTPATIENT)
Dept: ANTICOAGULATION | Facility: CLINIC | Age: 58
End: 2018-07-26

## 2018-07-26 ENCOUNTER — TELEPHONE (OUTPATIENT)
Dept: TRANSPLANT | Facility: CLINIC | Age: 58
End: 2018-07-26

## 2018-07-26 DIAGNOSIS — Z94.2 LUNG REPLACED BY TRANSPLANT (H): ICD-10-CM

## 2018-07-26 DIAGNOSIS — I82.409 DEEP VEIN THROMBOSIS (DVT) (H): ICD-10-CM

## 2018-07-26 DIAGNOSIS — A42.9 ACTINOMYCES INFECTION: Primary | ICD-10-CM

## 2018-07-26 DIAGNOSIS — Z94.2 LUNG TRANSPLANT RECIPIENT (H): ICD-10-CM

## 2018-07-26 LAB — INR PPP: 2.29

## 2018-07-26 RX ORDER — SULFAMETHOXAZOLE/TRIMETHOPRIM 800-160 MG
1 TABLET ORAL DAILY
Qty: 30 TABLET | Refills: 1 | Status: SHIPPED | OUTPATIENT
Start: 2018-07-26 | End: 2019-02-05

## 2018-07-26 NOTE — TELEPHONE ENCOUNTER
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  Dr Meier will see patient in clinic.  Will send orders to  for appt.    Kecia states she agrees with plan and will start treatment dose today.

## 2018-07-26 NOTE — MR AVS SNAPSHOT
Kecia Blue   7/26/2018   Anticoagulation Therapy Visit    Description:  57 year old female   Provider:  Ramin Hines [facility]   Department:  [facility]           INR as of 7/26/2018     Today's INR 2.29      Anticoagulation Summary as of 7/26/2018     INR goal 2.0-3.0   Today's INR 2.29   Full warfarin instructions 7/26: 4 mg; 7/27: 4 mg; 7/28: Hold; 7/29: Hold; 7/30: Hold; 7/31: Hold; 8/1: Hold; Otherwise No maintenance plan   Next INR check 8/2/2018    Indications   Lung transplant recipient (H) [Z94.2]  Deep vein thrombosis (DVT) (H) [I82.409] [I82.409]         July 2018 Details    Sun Mon Tue Wed Thu Fri Sat     1               2               3               4               5               6               7                 8               9               10               11               12               13               14                 15               16               17               18               19               20               21                 22               23               24               25               26      4 mg   See details      27      4 mg         28      Hold 29 Hold         30      Hold         31      Hold              Date Details   07/26 This INR check               How to take your warfarin dose     To take:  4 mg Take 4 of the 1 mg tablets.    Hold Do not take your warfarin dose. See the Details table to the right for additional instructions.                August 2018 Details    Sun Mon Tue Wed Thu Fri Sat        1      Hold         2            3               4                 5               6               7               8               9               10               11                 12               13               14               15               16               17               18                 19               20               21               22               23               24               25                 26                27               28               29               30               31                 Date Details   No additional details    Date of next INR:  8/2/2018         How to take your warfarin dose     Hold Do not take your warfarin dose. See the Details table to the right for additional instructions.

## 2018-07-26 NOTE — PROGRESS NOTES
ANTICOAGULATION FOLLOW-UP CLINIC VISIT    Patient Name:  Kecia Blue  Date:  7/26/2018  Contact Type:  Telephone    SUBJECTIVE:     Patient Findings     Positives Change in medications (Started Bactrim DS 1 daily and Valcyte 900mgs daily), No Problem Findings    Comments Patient is scheduled for a Bronch on 8/2 and will bridge with Lovenox 60mgs. Kecia has enough Lovenox at home.           OBJECTIVE    INR   Date Value Ref Range Status   07/26/2018 2.29  Final       ASSESSMENT / PLAN  INR assessment THER    Recheck INR In: 8 DAYS    INR Location Clinic      Anticoagulation Summary as of 7/26/2018     INR goal 2.0-3.0   Today's INR 2.29   Warfarin maintenance plan No maintenance plan   Full warfarin instructions 7/26: 4 mg; 7/27: 4 mg; 7/28: Hold; 7/29: Hold; 7/30: Hold; 7/31: Hold; 8/1: Hold; Otherwise No maintenance plan   Next INR check 8/2/2018   Priority INR   Target end date     Indications   Lung transplant recipient (H) [Z94.2]  Deep vein thrombosis (DVT) (H) [I82.409] [I82.409]         Anticoagulation Episode Summary     INR check location Clinic Lab    Preferred lab     Send INR reminders to [facility]    Comments Plan for 3 months of therapy- Provoked DVTHIPPA OK and  Verbal OK to speak with spouse and leave msg @ [phone].  labs @ [facility] starting ~ 5/25/18.  fax: [phone]. ph:[phone] BRONCH PLAN NOTE 6/6/18      Anticoagulation Care Providers     Provider Role Specialty Phone number    Ame Chow PA-C Responsible Pulmonary [phone]            See the Encounter Report to view Anticoagulation Flowsheet and Dosing Calendar (Go to Encounters tab in chart review, and find the Anticoagulation Therapy Visit)    Spoke with Kecia today and gave her the following bridging plan for her Bronch on 8/2/18 Will Bridge with Lovenox 60mgs subcutaneous every 12 hours    Sat 7/28/18  Hold warfarin  Sun 7/29/18 Hold warfarin Give Lovenox in AM and PM  Mon 7/30/18 Hold  warfarin Give Lovenox in AM and PM  Tues 7/31/18 Hold warfarin Give Lovenox AM and PM  Wed 8/1/18  Hold warfarin and Lovenox  Tues 8/2/18 Day of Procedure Hold warfarin and Lovenox will call patient after Bronch to help with restarting her anticoagulation    Ramin Hines, [facility]             Addendum 8/1/18 INR 1.28 no calls made per pt is holding her Warfarin for an upcoming bronch. Will f/u with pt after bronch. Ofe Redmond RN

## 2018-07-26 NOTE — Clinical Note
Farnaz, please have patient seen in ID by Dr Meier.  She knows about patient and wants to see her. Should be seen within one month.  If possible, patient is here 8/1/18 for clinic with us. Call patient on cell phone to confirm. Thanks Magaly

## 2018-07-30 DIAGNOSIS — Z94.2 S/P LUNG TRANSPLANT (H): ICD-10-CM

## 2018-07-30 RX ORDER — MYCOPHENOLATE MOFETIL 250 MG/1
1500 CAPSULE ORAL 2 TIMES DAILY
Qty: 360 CAPSULE | Refills: 11 | Status: SHIPPED | OUTPATIENT
Start: 2018-07-30 | End: 2018-11-05 | Stop reason: SINTOL

## 2018-07-30 NOTE — LETTER
PHYSICIAN ORDERS      DATE & TIME ISSUED: 2018 1:42 PM  PATIENT NAME: Kecia Blue   : 1960     [facility] MR# [if applicable]: [id number]     DIAGNOSIS:  Lung Transplant  Z94.2, deconditioned  Please enroll patient into pool therapy per insurance coverage.      Any questions please call: Magaly [phone]    Please fax these results to [phone].      .

## 2018-08-01 ENCOUNTER — HOSPITAL ENCOUNTER (INPATIENT)
Facility: CLINIC | Age: 58
LOS: 3 days | Discharge: HOME | DRG: 167 | End: 2018-08-04
Attending: INTERNAL MEDICINE | Admitting: INTERNAL MEDICINE
Payer: COMMERCIAL

## 2018-08-01 ENCOUNTER — RESULTS ONLY (OUTPATIENT)
Dept: OTHER | Facility: CLINIC | Age: 58
End: 2018-08-01

## 2018-08-01 ENCOUNTER — RADIANT APPOINTMENT (OUTPATIENT)
Dept: GENERAL RADIOLOGY | Facility: CLINIC | Age: 58
End: 2018-08-01
Attending: PHYSICIAN ASSISTANT
Payer: COMMERCIAL

## 2018-08-01 ENCOUNTER — OFFICE VISIT (OUTPATIENT)
Dept: PULMONOLOGY | Facility: CLINIC | Age: 58
DRG: 167 | End: 2018-08-01
Attending: PHYSICIAN ASSISTANT
Payer: COMMERCIAL

## 2018-08-01 VITALS
RESPIRATION RATE: 16 BRPM | SYSTOLIC BLOOD PRESSURE: 132 MMHG | HEART RATE: 89 BPM | OXYGEN SATURATION: 94 % | DIASTOLIC BLOOD PRESSURE: 68 MMHG

## 2018-08-01 DIAGNOSIS — Z94.2 LUNG TRANSPLANT RECIPIENT (H): Primary | ICD-10-CM

## 2018-08-01 DIAGNOSIS — Z94.2 LUNG TRANSPLANT RECIPIENT (H): ICD-10-CM

## 2018-08-01 DIAGNOSIS — B25.9 CYTOMEGALOVIRUS (CMV) VIREMIA (H): ICD-10-CM

## 2018-08-01 DIAGNOSIS — A04.72 C. DIFFICILE COLITIS: ICD-10-CM

## 2018-08-01 DIAGNOSIS — Z79.899 ENCOUNTER FOR LONG-TERM (CURRENT) USE OF HIGH-RISK MEDICATION: ICD-10-CM

## 2018-08-01 DIAGNOSIS — E86.0 DEHYDRATION: ICD-10-CM

## 2018-08-01 DIAGNOSIS — Z94.2 S/P LUNG TRANSPLANT (H): ICD-10-CM

## 2018-08-01 DIAGNOSIS — J84.9 ILD (INTERSTITIAL LUNG DISEASE) (H): Primary | ICD-10-CM

## 2018-08-01 DIAGNOSIS — J84.9 ILD (INTERSTITIAL LUNG DISEASE) (H): ICD-10-CM

## 2018-08-01 DIAGNOSIS — D64.9 ANEMIA, UNSPECIFIED TYPE: ICD-10-CM

## 2018-08-01 DIAGNOSIS — I82.629 DEEP VEIN THROMBOSIS (DVT) OF UPPER EXTREMITY, UNSPECIFIED CHRONICITY, UNSPECIFIED LATERALITY, UNSPECIFIED VEIN (H): ICD-10-CM

## 2018-08-01 DIAGNOSIS — I10 HYPERTENSION, UNSPECIFIED TYPE: ICD-10-CM

## 2018-08-01 LAB
ANION GAP SERPL CALCULATED.3IONS-SCNC: 10 MMOL/L (ref 3–14)
BUN SERPL-MCNC: 27 MG/DL (ref 7–30)
CALCIUM SERPL-MCNC: 9.1 MG/DL (ref 8.5–10.1)
CHLORIDE SERPL-SCNC: 105 MMOL/L (ref 94–109)
CO2 SERPL-SCNC: 21 MMOL/L (ref 20–32)
CREAT SERPL-MCNC: 1.38 MG/DL (ref 0.52–1.04)
ERYTHROCYTE [DISTWIDTH] IN BLOOD BY AUTOMATED COUNT: 14.5 % (ref 10–15)
FERRITIN SERPL-MCNC: 571 NG/ML (ref 8–252)
GFR SERPL CREATININE-BSD FRML MDRD: 40 ML/MIN/1.7M2
GLUCOSE SERPL-MCNC: 123 MG/DL (ref 70–99)
HCT VFR BLD AUTO: 25.6 % (ref 35–47)
HGB BLD-MCNC: 7.6 G/DL (ref 11.7–15.7)
INR PPP: 1.28 (ref 0.86–1.14)
IRON SATN MFR SERPL: 37 % (ref 15–46)
IRON SERPL-MCNC: 93 UG/DL (ref 35–180)
MAGNESIUM SERPL-MCNC: 1.7 MG/DL (ref 1.6–2.3)
MCH RBC QN AUTO: 29.9 PG (ref 26.5–33)
MCHC RBC AUTO-ENTMCNC: 29.7 G/DL (ref 31.5–36.5)
MCV RBC AUTO: 101 FL (ref 78–100)
PLATELET # BLD AUTO: 407 10E9/L (ref 150–450)
POTASSIUM SERPL-SCNC: 4.5 MMOL/L (ref 3.4–5.3)
RBC # BLD AUTO: 2.54 10E12/L (ref 3.8–5.2)
SODIUM SERPL-SCNC: 136 MMOL/L (ref 133–144)
TACROLIMUS BLD-MCNC: 10 UG/L (ref 5–15)
TIBC SERPL-MCNC: 248 UG/DL (ref 240–430)
TME LAST DOSE: NORMAL H
VIT B12 SERPL-MCNC: 1753 PG/ML (ref 193–986)
WBC # BLD AUTO: 6.9 10E9/L (ref 4–11)

## 2018-08-01 PROCEDURE — 87799 DETECT AGENT NOS DNA QUANT: CPT | Performed by: PHYSICIAN ASSISTANT

## 2018-08-01 PROCEDURE — 25000132 ZZH RX MED GY IP 250 OP 250 PS 637: Performed by: PHYSICIAN ASSISTANT

## 2018-08-01 PROCEDURE — 25000125 ZZHC RX 250: Performed by: PHYSICIAN ASSISTANT

## 2018-08-01 PROCEDURE — 87529 HSV DNA AMP PROBE: CPT | Performed by: PHYSICIAN ASSISTANT

## 2018-08-01 PROCEDURE — 21400006 ZZH R&B CCU INTERMEDIATE UMMC

## 2018-08-01 PROCEDURE — 36415 COLL VENOUS BLD VENIPUNCTURE: CPT | Performed by: PHYSICIAN ASSISTANT

## 2018-08-01 PROCEDURE — 85610 PROTHROMBIN TIME: CPT | Performed by: PHYSICIAN ASSISTANT

## 2018-08-01 PROCEDURE — 25000131 ZZH RX MED GY IP 250 OP 636 PS 637: Performed by: PHYSICIAN ASSISTANT

## 2018-08-01 PROCEDURE — 86832 HLA CLASS I HIGH DEFIN QUAL: CPT | Performed by: INTERNAL MEDICINE

## 2018-08-01 PROCEDURE — 84443 ASSAY THYROID STIM HORMONE: CPT | Performed by: PHYSICIAN ASSISTANT

## 2018-08-01 PROCEDURE — 40000141 ZZH STATISTIC PERIPHERAL IV START W/O US GUIDANCE

## 2018-08-01 PROCEDURE — 25000128 H RX IP 250 OP 636: Performed by: PHYSICIAN ASSISTANT

## 2018-08-01 PROCEDURE — 86833 HLA CLASS II HIGH DEFIN QUAL: CPT | Performed by: INTERNAL MEDICINE

## 2018-08-01 PROCEDURE — 80197 ASSAY OF TACROLIMUS: CPT | Performed by: PHYSICIAN ASSISTANT

## 2018-08-01 PROCEDURE — 80048 BASIC METABOLIC PNL TOTAL CA: CPT | Performed by: PHYSICIAN ASSISTANT

## 2018-08-01 PROCEDURE — 83735 ASSAY OF MAGNESIUM: CPT | Performed by: PHYSICIAN ASSISTANT

## 2018-08-01 PROCEDURE — 85027 COMPLETE CBC AUTOMATED: CPT | Performed by: PHYSICIAN ASSISTANT

## 2018-08-01 PROCEDURE — 82728 ASSAY OF FERRITIN: CPT | Performed by: PHYSICIAN ASSISTANT

## 2018-08-01 PROCEDURE — 82607 VITAMIN B-12: CPT | Performed by: PHYSICIAN ASSISTANT

## 2018-08-01 PROCEDURE — G0463 HOSPITAL OUTPT CLINIC VISIT: HCPCS | Mod: ZF

## 2018-08-01 PROCEDURE — 83550 IRON BINDING TEST: CPT | Performed by: PHYSICIAN ASSISTANT

## 2018-08-01 PROCEDURE — 83540 ASSAY OF IRON: CPT | Performed by: PHYSICIAN ASSISTANT

## 2018-08-01 RX ORDER — PREDNISONE 5 MG/1
5 TABLET ORAL EVERY EVENING
Status: DISCONTINUED | OUTPATIENT
Start: 2018-08-01 | End: 2018-08-04 | Stop reason: HOSPADM

## 2018-08-01 RX ORDER — NYSTATIN 100000/ML
10 SUSPENSION, ORAL (FINAL DOSE FORM) ORAL 4 TIMES DAILY
Status: DISCONTINUED | OUTPATIENT
Start: 2018-08-01 | End: 2018-08-04 | Stop reason: HOSPADM

## 2018-08-01 RX ORDER — ACETAMINOPHEN 500 MG
1000 TABLET ORAL EVERY 8 HOURS PRN
Status: DISCONTINUED | OUTPATIENT
Start: 2018-08-01 | End: 2018-08-04 | Stop reason: HOSPADM

## 2018-08-01 RX ORDER — METOPROLOL TARTRATE 25 MG/1
25 TABLET, FILM COATED ORAL 2 TIMES DAILY
Status: DISCONTINUED | OUTPATIENT
Start: 2018-08-01 | End: 2018-08-04 | Stop reason: HOSPADM

## 2018-08-01 RX ORDER — SODIUM CHLORIDE 9 MG/ML
INJECTION, SOLUTION INTRAVENOUS CONTINUOUS
Status: DISCONTINUED | OUTPATIENT
Start: 2018-08-01 | End: 2018-08-02

## 2018-08-01 RX ORDER — MAGNESIUM OXIDE 400 MG/1
400 TABLET ORAL DAILY
Status: DISCONTINUED | OUTPATIENT
Start: 2018-08-02 | End: 2018-08-04 | Stop reason: HOSPADM

## 2018-08-01 RX ORDER — VALGANCICLOVIR 450 MG/1
450 TABLET, FILM COATED ORAL DAILY
Qty: 30 TABLET | Refills: 6 | COMMUNITY
Start: 2018-08-01 | End: 2018-12-13

## 2018-08-01 RX ORDER — PANTOPRAZOLE SODIUM 40 MG/1
40 TABLET, DELAYED RELEASE ORAL EVERY MORNING
Status: DISCONTINUED | OUTPATIENT
Start: 2018-08-02 | End: 2018-08-04 | Stop reason: HOSPADM

## 2018-08-01 RX ORDER — VALGANCICLOVIR 450 MG/1
450 TABLET, FILM COATED ORAL DAILY
Status: DISCONTINUED | OUTPATIENT
Start: 2018-08-02 | End: 2018-08-02

## 2018-08-01 RX ORDER — MYCOPHENOLATE MOFETIL 250 MG/1
1500 CAPSULE ORAL 2 TIMES DAILY
Status: DISCONTINUED | OUTPATIENT
Start: 2018-08-01 | End: 2018-08-04 | Stop reason: HOSPADM

## 2018-08-01 RX ORDER — SULFAMETHOXAZOLE/TRIMETHOPRIM 800-160 MG
1 TABLET ORAL DAILY
Status: DISCONTINUED | OUTPATIENT
Start: 2018-08-02 | End: 2018-08-04 | Stop reason: HOSPADM

## 2018-08-01 RX ORDER — PENTOXIFYLLINE 400 MG/1
400 TABLET, EXTENDED RELEASE ORAL 2 TIMES DAILY
Status: DISCONTINUED | OUTPATIENT
Start: 2018-08-01 | End: 2018-08-04 | Stop reason: HOSPADM

## 2018-08-01 RX ORDER — TACROLIMUS 1 MG/1
1 CAPSULE ORAL
Status: DISCONTINUED | OUTPATIENT
Start: 2018-08-01 | End: 2018-08-01

## 2018-08-01 RX ORDER — MULTIPLE VITAMINS W/ MINERALS TAB 9MG-400MCG
1 TAB ORAL DAILY
Status: DISCONTINUED | OUTPATIENT
Start: 2018-08-02 | End: 2018-08-04 | Stop reason: HOSPADM

## 2018-08-01 RX ADMIN — SODIUM CHLORIDE: 9 INJECTION, SOLUTION INTRAVENOUS at 18:43

## 2018-08-01 RX ADMIN — METOPROLOL TARTRATE 25 MG: 25 TABLET ORAL at 19:51

## 2018-08-01 RX ADMIN — NYSTATIN 1000000 UNITS: 100000 SUSPENSION ORAL at 19:50

## 2018-08-01 RX ADMIN — MYCOPHENOLATE MOFETIL 1500 MG: 250 CAPSULE ORAL at 19:50

## 2018-08-01 RX ADMIN — PENTOXIFYLLINE 400 MG: 400 TABLET, FILM COATED, EXTENDED RELEASE ORAL at 19:51

## 2018-08-01 RX ADMIN — TACROLIMUS 6 MG: 5 CAPSULE ORAL at 19:51

## 2018-08-01 RX ADMIN — PREDNISONE 5 MG: 5 TABLET ORAL at 19:51

## 2018-08-01 ASSESSMENT — ENCOUNTER SYMPTOMS
EYE PAIN: 0
COUGH DISTURBING SLEEP: 1
MUSCLE CRAMPS: 0
DECREASED APPETITE: 1
POLYPHAGIA: 0
EYE WATERING: 1
EYE REDNESS: 0
WEIGHT LOSS: 0
CHILLS: 1
DIARRHEA: 1
TASTE DISTURBANCE: 0
ARTHRALGIAS: 1
DYSPNEA ON EXERTION: 1
POLYDIPSIA: 0
COUGH: 1
BLOATING: 0
NAUSEA: 0
RECTAL PAIN: 0
CONSTIPATION: 0
ABDOMINAL PAIN: 0
MUSCLE WEAKNESS: 1
JOINT SWELLING: 0
INCREASED ENERGY: 1
NECK PAIN: 0
HOARSE VOICE: 1
SMELL DISTURBANCE: 0
NIGHT SWEATS: 0
STIFFNESS: 1
JAUNDICE: 0
FATIGUE: 1
HALLUCINATIONS: 0
MYALGIAS: 1
VOMITING: 0
WEIGHT GAIN: 0
WHEEZING: 1
DOUBLE VISION: 0
BACK PAIN: 0
NECK MASS: 0
BOWEL INCONTINENCE: 0
SPUTUM PRODUCTION: 1
SNORES LOUDLY: 0
SINUS CONGESTION: 1
FEVER: 0
SORE THROAT: 0
HEMOPTYSIS: 0
HEARTBURN: 0
ALTERED TEMPERATURE REGULATION: 1
TROUBLE SWALLOWING: 0
SINUS PAIN: 0
EYE IRRITATION: 0
POSTURAL DYSPNEA: 0
SHORTNESS OF BREATH: 1
BLOOD IN STOOL: 0

## 2018-08-01 ASSESSMENT — ACTIVITIES OF DAILY LIVING (ADL): ADLS_ACUITY_SCORE: 10

## 2018-08-01 ASSESSMENT — PAIN SCALES - GENERAL: PAINLEVEL: NO PAIN (0)

## 2018-08-01 NOTE — LETTER
8/1/2018     RE: Kecia Blue  [address] [place] [address]     Dear Colleague,    Thank you for referring your patient, Kecia Blue, to the [facility] L[facility]ENCE [facility] HE[facility] at [facility]. Please see a copy of my visit note below.    Please see H/P. Patient is getting admitted for CMV viremia, acute diarrhea, RADHA and FTT.     Again, thank you for allowing me to participate in the care of your patient.      Sincerely,    Ame Chow PA-C

## 2018-08-01 NOTE — NURSING NOTE
Chief Complaint   Patient presents with     Lung Transplant     Patient is being seen for post lung tx follow up      Judith Dove CMA at 10:04 AM on 8/1/2018

## 2018-08-01 NOTE — IP AVS SNAPSHOT
MRN:[id number]                      After Visit Summary   8/1/2018    Kecia Blue    MRN: [id number]           Thank you!     Thank you for choosing [facility] for your care. Our goal is always to provide you with excellent care. Hearing back from our patients is one way we can continue to improve our services. Please take a few minutes to complete the written survey that you may receive in the mail after you visit with us. Thank you!        Patient Information     Date Of Birth          1960        Designated Caregiver       Most Recent Value    Caregiver    Will someone help with your care after discharge? yes    Name of designated caregiver Ranjan    Phone number of caregiver [phone]    Caregiver address [place], [place]      About your hospital stay     You were admitted on:  August 1, 2018 You last received care in the:  [address] [facility]    You were discharged on:  August 4, 2018        Reason for your hospital stay       Diarrhea, C diff colitis                  Who to Call     For medical emergencies, please call 911.  For non-urgent questions about your medical care, please call your primary care provider or clinic, [phone]  For questions related to your surgery, please call your surgery clinic        Attending Provider     Provider Angelica Martinez MD Internal Medicine       Primary Care Provider Office Phone # Fax #    Rosy Vu [phone]      After Care Instructions     Activity       Your activity upon discharge: activity as tolerated            Diet       Follow this diet upon discharge: Regular                  Your next 10 appointments already scheduled     Aug 22, 2018  2:00 PM CDT   (Arrive by 1:45 PM)   New Patient Visit with Audra Snyder DO   [facility] ([facility])    [address] [place] [address]  [address]  [facility] [address]    [phone]              Additional Services     Medication Therapy Management Referral       MTM referral reason            Patient had a hospital or ED visit in last 6 months and has more than 10   PTA or Discharge medications       This service is designed to help you get the most from your medications.  A specially trained pharmacist will work closely with you and your doctors  to solve any problems related to your medications and to help you get the   best results from taking them.      The Medication Therapy Management staff will call you to schedule an appointment.                  Additional Information     If you use hormonal birth control (such as the pill, patch, ring or implants): You'll need a second form of birth control for 7 days (condoms, a diaphragm or contraceptive foam). While in the hospital, you received a medicine called Bridion. Your normal birth control will not work as well for a week after taking this medicine.          Pending Results     Date and Time Order Name Status Description    8/2/2018 2300 Cytomegalovirus Antiviral Resistance In process     8/2/2018 1354 Bronchial Culture Aerobic Bacterial Preliminary     8/2/2018 1354 Nocardia culture Preliminary     8/2/2018 1354 Fungus Culture, non-blood Preliminary     8/2/2018 1354 AFB Stain Non Blood Preliminary     8/2/2018 1354 AFB Culture Non Blood Preliminary     8/2/2018 1354 Actinomyces rule out Preliminary             Statement of Approval     Ordered          08/04/18 1026  I have reviewed and agree with all the recommendations and orders detailed in this document.  EFFECTIVE NOW     Approved and electronically signed by:  Rekha Edwards MD             Admission Information     Date & Time Provider Department Dept. Phone    8/1/2018 Angelica Figueroa MD [address] [facility] [phone]      Your Vitals Were     Blood Pressure Temperature Respirations Height Weight Last Period    127/72 (BP Location: Left arm) 98.7  F  "(37.1  C) (Oral) 20 1.626 m (5' 4\") 56.4 kg (124 lb 6.4 oz) 06/07/2014 (Exact Date)    Pulse Oximetry BMI (Body Mass Index)                99% 21.35 kg/m2          [facility] [facility] Information     [facility] gives you secure access to your electronic health record. If you see a primary care provider, you can also send messages to your care team and make appointments. If you have questions, please call your primary care clinic.  If you do not have a primary care provider, please call [phone] and they will assist you.        Care EveryWhere ID     This is your Care EveryWhere ID. This could be used by other organizations to access your [facility] medical records  [id number]        Equal Access to Services     ALBAN VALENCIA : Sahara Lucas, yasmin sierra, lakshmi vaz, morro kelley. So [facility] [phone].    ATENCIÓN: Si habla español, tiene a taylor disposición servicios gratuitos de asistencia lingüística. Llame al [phone].    We comply with applicable federal civil rights laws and [place] laws. We do not discriminate on the basis of race, color, national origin, age, disability, sex, sexual orientation, or gender identity.               Review of your medicines      START taking        Dose / Directions    enoxaparin 60 MG/0.6ML injection   Commonly known as:  LOVENOX   Used for:  Deep vein thrombosis (DVT) of upper extremity, unspecified chronicity, unspecified laterality, unspecified vein (H)        Dose:  60 mg   Inject 0.6 mLs (60 mg) Subcutaneous every 12 hours   Quantity:  12 Syringe   Refills:  0       vancomycin 50 MG/ML Solr   Commonly known as:  FIRVANQ   Indication:  Clostridium difficile Bacteria   Used for:  C. difficile colitis        Dose:  125 mg   Take 2.5 mLs (125 mg) by mouth 4 times daily   Quantity:  90 mL   Refills:  0         CONTINUE these medicines which have NOT CHANGED        Dose / Directions    ACETAMINOPHEN PO        Dose:  1000 mg "   Take 1,000 mg by mouth every 6 hours as needed for pain   Refills:  0       albuterol 108 (90 Base) MCG/ACT Inhaler   Commonly known as:  PROAIR HFA/PROVENTIL HFA/VENTOLIN HFA   Used for:  SOB (shortness of breath), Wheezing        Dose:  2 puff   Inhale 2 puffs into the lungs every 6 hours as needed for shortness of breath / dyspnea or wheezing   Quantity:  1 Inhaler   Refills:  1       calcium-vitamin D 600-400 MG-UNIT per tablet   Commonly known as:  CALTRATE   Used for:  S/P lung transplant (H), ILD (interstitial lung disease) (H), Lung transplant recipient (H), Encounter for long-term (current) use of high-risk medication, Anemia, unspecified type, Cytomegalovirus (CMV) viremia (H)        Dose:  1 tablet   Take 1 tablet by mouth daily   Refills:  0       levalbuterol 45 MCG/ACT Inhaler   Commonly known as:  XOPENEX HFA   Used for:  S/P lung transplant (H), Other constipation        Dose:  2 puff   Inhale 2 puffs into the lungs every 6 hours as needed for shortness of breath / dyspnea or wheezing   Quantity:  1 Inhaler   Refills:  11       magnesium oxide 400 MG tablet   Commonly known as:  MAG-OX   Used for:  Hypomagnesemia        Dose:  400 mg   Take 1 tablet (400 mg) by mouth daily   Quantity:  90 tablet   Refills:  3       metoprolol tartrate 25 MG tablet   Commonly known as:  LOPRESSOR   Used for:  Paroxysmal atrial fibrillation (H)        Dose:  25 mg   Take 1 tablet (25 mg) by mouth 2 times daily   Quantity:  60 tablet   Refills:  11       multivitamin, therapeutic with minerals Tabs tablet   Used for:  Lung transplant recipient (H)        Dose:  1 tablet   Take 1 tablet by mouth daily   Quantity:  30 each   Refills:  11       mycophenolate 250 MG capsule   Commonly known as:  GENERIC EQUIVALENT   Used for:  S/P lung transplant (H)        Dose:  1500 mg   Take 6 capsules (1,500 mg) by mouth 2 times daily   Quantity:  360 capsule   Refills:  11       nystatin 841192 UNIT/ML suspension   Commonly known  as:  MYCOSTATIN   Used for:  Lung transplant recipient (H)        Dose:  10 mL   Take 10 mLs (1,000,000 Units) by mouth 4 times daily   Quantity:  1200 mL   Refills:  5       pantoprazole 40 MG EC tablet   Commonly known as:  PROTONIX   Used for:  Gastroesophageal reflux disease without esophagitis        Dose:  40 mg   Take 1 tablet (40 mg) by mouth every morning   Quantity:  30 tablet   Refills:  11       pentoxifylline 400 MG CR tablet   Commonly known as:  TRENtal   Used for:  Lung transplant recipient (H)        Dose:  400 mg   Take 1 tablet (400 mg) by mouth 2 times daily   Quantity:  60 tablet   Refills:  3       * predniSONE 2.5 MG tablet   Commonly known as:  DELTASONE   Used for:  Lung replaced by transplant (H)        Dose:  2.5 mg   Take 1 tablet (2.5 mg) by mouth daily 6/22/18             7.5                    7.5 7/20/18             7.5                    5 8/24/18             5                       5 9/21/18             5                       2.5   Quantity:  90 tablet   Refills:  1       * predniSONE 5 MG tablet   Commonly known as:  DELTASONE   Used for:  S/P lung transplant (H)        Follow taper card   Quantity:  180 tablet   Refills:  3       sulfamethoxazole-trimethoprim 800-160 MG per tablet   Commonly known as:  BACTRIM DS/SEPTRA DS   Used for:  Actinomyces infection, Lung replaced by transplant (H)        Dose:  1 tablet   Take 1 tablet by mouth daily X one month.   Quantity:  30 tablet   Refills:  1       * tacrolimus 1 MG capsule   Commonly known as:  GENERIC EQUIVALENT   Used for:  S/P lung transplant (H), Other constipation        Take 5 mg in the AM and 6 mg in the PM along with one 0.5mg in the am for total dose 5.5mg am and 6mg pm.   Quantity:  330 capsule   Refills:  11       * tacrolimus 0.5 MG capsule   Commonly known as:  GENERIC EQUIVALENT   Used for:  S/P lung transplant (H)        Dose:  0.5 mg   Take 1 capsule (0.5 mg) by mouth every morning Along with five 1mg capsules for  total dose of 5.5mg am and 6mg pm.   Quantity:  30 capsule   Refills:  11       VALCYTE 450 MG tablet   Used for:  Cytomegalovirus (CMV) viremia (H), ILD (interstitial lung disease) (H), Lung transplant recipient (H), Encounter for long-term (current) use of high-risk medication, Anemia, unspecified type, S/P lung transplant (H)   Generic drug:  valGANciclovir        Dose:  900 mg   Take 2 tablets (900 mg) by mouth daily   Quantity:  30 tablet   Refills:  6       warfarin 1 MG tablet   Commonly known as:  COUMADIN   Used for:  Status post lung transplantation (H), DVT (deep venous thrombosis) (H)        Take 4mg MWFSat and 6mg TuThSun, or as directed by the Medication Monitoring Clinic at the [facility].   Quantity:  140 tablet   Refills:  3       * Notice:  This list has 4 medication(s) that are the same as other medications prescribed for you. Read the directions carefully, and ask your doctor or other care provider to review them with you.      STOP taking     gabapentin 100 MG capsule   Commonly known as:  NEURONTIN                Where to get your medicines      These medications were sent to [facility] [place], [place] - [address] [place] [id number]     Phone:  [phone]     vancomycin 50 MG/ML Solr         Some of these will need a paper prescription and others can be bought over the counter. Ask your nurse if you have questions.     Bring a paper prescription for each of these medications     enoxaparin 60 MG/0.6ML injection                Protect others around you: Learn how to safely use, store and throw away your medicines at www.disposemymeds.org.        ANTIBIOTIC INSTRUCTION     You've Been Prescribed an Antibiotic - Now What?  Your healthcare team thinks that you or your loved one might have an infection. Some infections can be treated with antibiotics, which are powerful, life-saving drugs. Like all medications, antibiotics have side effects and  should only be used when necessary. There are some important things you should know about your antibiotic treatment.      Your healthcare team may run tests before you start taking an antibiotic.    Your team may take samples (e.g., from your blood, urine or other areas) to run tests to look for bacteria. These test can be important to determine if you need an antibiotic at all and, if you do, which antibiotic will work best.      Within a few days, your healthcare team might change or even stop your antibiotic.    Your team may start you on an antibiotic while they are working to find out what is making you sick.    Your team might change your antibiotic because test results show that a different antibiotic would be better to treat your infection.    In some cases, once your team has more information, they learn that you do not need an antibiotic at all. They may find out that you don't have an infection, or that the antibiotic you're taking won't work against your infection. For example, an infection caused by a virus can't be treated with antibiotics. Staying on an antibiotic when you don't need it is more likely to be harmful than helpful.      You may experience side effects from your antibiotic.    Like all medications, antibiotics have side effects. Some of these can be serious.    Let you healthcare team know if you have any known allergies when you are admitted to the hospital.    One significant side effect of nearly all antibiotics is the risk of severe and sometimes deadly diarrhea caused by Clostridium difficile (C. Difficile). This occurs when a person takes antibiotics because some good germs are destroyed. Antibiotic use allows C. diificile to take over, putting patients at high risk for this serious infection.    As a patient or caregiver, it is important to understand your or your loved one's antibiotic treatment. It is especially important for caregivers to speak up when patients can't speak for  themselves. Here are some important questions to ask your healthcare team.    What infection is this antibiotic treating and how do you know I have that infection?    What side effects might occur from this antibiotic?    How long will I need to take this antibiotic?    Is it safe to take this antibiotic with other medications or supplements (e.g., vitamins) that I am taking?     Are there any special directions I need to know about taking this antibiotic? For example, should I take it with food?    How will I be monitored to know whether my infection is responding to the antibiotic?    What tests may help to make sure the right antibiotic is prescribed for me?      Information provided by:  www.cdc.gov/getsmart  U.S. Department of Health and Human Services  Centers for disease Control and Prevention  National Center for Emerging and Zoonotic Infectious Diseases  Division of Healthcare Quality Promotion             Medication List: This is a list of all your medications and when to take them. Check marks below indicate your daily home schedule. Keep this list as a reference.      Medications           Morning Afternoon Evening Bedtime As Needed    ACETAMINOPHEN PO   Take 1,000 mg by mouth every 6 hours as needed for pain   Last time this was given:  975 mg on 8/2/2018 12:26 PM                                   albuterol 108 (90 Base) MCG/ACT Inhaler   Commonly known as:  PROAIR HFA/PROVENTIL HFA/VENTOLIN HFA   Inhale 2 puffs into the lungs every 6 hours as needed for shortness of breath / dyspnea or wheezing                                   calcium-vitamin D 600-400 MG-UNIT per tablet   Commonly known as:  CALTRATE   Take 1 tablet by mouth daily   Last time this was given:  1 tablet on 8/3/2018 10:04 AM                                   enoxaparin 60 MG/0.6ML injection   Commonly known as:  LOVENOX   Inject 0.6 mLs (60 mg) Subcutaneous every 12 hours   Last time this was given:  60 mg on 8/4/2018  6:06 AM             6am        6pm               levalbuterol 45 MCG/ACT Inhaler   Commonly known as:  XOPENEX HFA   Inhale 2 puffs into the lungs every 6 hours as needed for shortness of breath / dyspnea or wheezing                                   magnesium oxide 400 MG tablet   Commonly known as:  MAG-OX   Take 1 tablet (400 mg) by mouth daily   Last time this was given:  400 mg on 8/3/2018 10:05 AM                                   metoprolol tartrate 25 MG tablet   Commonly known as:  LOPRESSOR   Take 1 tablet (25 mg) by mouth 2 times daily   Last time this was given:  25 mg on 8/4/2018  9:12 AM                                      multivitamin, therapeutic with minerals Tabs tablet   Take 1 tablet by mouth daily   Last time this was given:  1 tablet on 8/3/2018 10:04 AM                                   mycophenolate 250 MG capsule   Commonly known as:  GENERIC EQUIVALENT   Take 6 capsules (1,500 mg) by mouth 2 times daily   Last time this was given:  1,500 mg on 8/4/2018  9:12 AM                           8pm             nystatin 294258 UNIT/ML suspension   Commonly known as:  MYCOSTATIN   Take 10 mLs (1,000,000 Units) by mouth 4 times daily   Last time this was given:  1,000,000 Units on 8/4/2018  9:12 AM                                            pantoprazole 40 MG EC tablet   Commonly known as:  PROTONIX   Take 1 tablet (40 mg) by mouth every morning   Last time this was given:  40 mg on 8/4/2018  9:11 AM                                   pentoxifylline 400 MG CR tablet   Commonly known as:  TRENtal   Take 1 tablet (400 mg) by mouth 2 times daily   Last time this was given:  400 mg on 8/4/2018  9:11 AM                           8pm             * predniSONE 2.5 MG tablet   Commonly known as:  DELTASONE   Take 1 tablet (2.5 mg) by mouth daily 6/22/18             7.5                    7.5 7/20/18             7.5                    5 8/24/18             5                       5 9/21/18             5                       2.5    Last time this was given:  7.5 mg on 8/4/2018  9:11 AM                                   * predniSONE 5 MG tablet   Commonly known as:  DELTASONE   Follow taper card   Last time this was given:  7.5 mg on 8/4/2018  9:11 AM                                   sulfamethoxazole-trimethoprim 800-160 MG per tablet   Commonly known as:  BACTRIM DS/SEPTRA DS   Take 1 tablet by mouth daily X one month.   Last time this was given:  1 tablet on 8/4/2018  9:12 AM                                   * tacrolimus 1 MG capsule   Commonly known as:  GENERIC EQUIVALENT   Take 5 mg in the AM and 6 mg in the PM along with one 0.5mg in the am for total dose 5.5mg am and 6mg pm.   Last time this was given:  5.5 mg on 8/4/2018  9:11 AM            5.5 mg                         * tacrolimus 0.5 MG capsule   Commonly known as:  GENERIC EQUIVALENT   Take 1 capsule (0.5 mg) by mouth every morning Along with five 1mg capsules for total dose of 5.5mg am and 6mg pm.   Last time this was given:  5.5 mg on 8/4/2018  9:11 AM                    6mg                VALCYTE 450 MG tablet   Take 2 tablets (900 mg) by mouth daily   Last time this was given:  900 mg on 8/4/2018  9:11 AM   Generic drug:  valGANciclovir                                   vancomycin 50 MG/ML Solr   Commonly known as:  FIRVANQ   Take 2.5 mLs (125 mg) by mouth 4 times daily   Last time this was given:  125 mg on 8/4/2018  9:11 AM                                            warfarin 1 MG tablet   Commonly known as:  COUMADIN   Take 4mg MWFSat and 6mg TuThSun, or as directed by the Medication Monitoring Clinic at the  of .   Last time this was given:  4 mg on 8/3/2018  6:15 PM                                   * Notice:  This list has 4 medication(s) that are the same as other medications prescribed for you. Read the directions carefully, and ask your doctor or other care provider to review them with you.

## 2018-08-01 NOTE — PROGRESS NOTES
Admission          8/1/2018  4:55 PM  -----------------------------------------------------------  Diagnosis: CMV viremia, diarrhea, FTT, RADHA, and dehydration.    Admitted from: clinic  Report given from: clinic RN  Via: ambulatory  Accompanied by:   Belongings: Placed in closet; meds sent home with family  Admission Profile: complete  Teaching: orientation to unit, call don't fall, use of console, meal times, visiting hours,  when to call for the RN (angina/sob/dizzyness, etc.), and enforced importance of safety   Access: PIV  Telemetry:Placed on pt  Ht./Wt.: complete    Temp:  [98.5  F (36.9  C)] 98.5  F (36.9  C)  Pulse:  [89] 89  Heart Rate:  [92] 92  Resp:  [14-16] 14  BP: (132-133)/(68-99) 133/99  SpO2:  [94 %-100 %] 100 %

## 2018-08-01 NOTE — NURSING NOTE
Called bed placement at 1600 and Marshall took patient's cell number to contact her directly when bad available.  Patient called by me.  Explained situation and that she would be called directly for admission.  On call coordinator notified.

## 2018-08-01 NOTE — NURSING NOTE
Transplant Coordinator Note    Reason for visit: Post lung transplant follow up visit   Coordinator: Present   Caregiver:      Health concerns addressed today:  1. OR bronch tomorrow right mainstem bronchus stenosis.  2. CMV--Valcyte treatment currently at 900mg daily per CrCL.  CMV pending today.  CrCL lower so may need to adjust dose to 450mg daily.  Lip lesions that are scarred over now.  Appeared last week.  3. Positive DSA  4. DVT--- Coumadin on hold for bronch tomorrow  - Will need at least 6 months of AC (possibly from US on 4/16)--US  prior to discontinuation  5. High risk donor--labs at 12 months.  6. Fatigued, cold, SOB with minimal activity only.  7. Labs showing dehydration, low hgb.  8. Diarrhea--x 4 days.  9. Anemia.    Activity:     Oxygen needs:     Pain management:     Diabetic management:     Pt Education:     Health Maintenance:     Labs, CXR, PFTs reviewed with patient  Medication record reviewed and reconciled  Questions and concerns addressed    Patient Instructions  1. Admit today.

## 2018-08-01 NOTE — IP AVS SNAPSHOT
[address] [address]    Phone:  [phone]                                       After Visit Summary   8/1/2018    Kecia Blue    MRN: [id number]           After Visit Summary Signature Page     I have received my discharge instructions, and my questions have been answered. I have discussed any challenges I see with this plan with the nurse or doctor.    ..........................................................................................................................................  Patient/Patient Representative Signature      ..........................................................................................................................................  Patient Representative Print Name and Relationship to Patient    ..................................................               ................................................  Date                                            Time    ..........................................................................................................................................  Reviewed by Signature/Title    ...................................................              ..............................................  Date                                                            Time

## 2018-08-01 NOTE — H&P
Medicine History and Physical  Department of Pulmonology, Allergy, Critical Care and Sleep Medicine    Patient Name: Kecia Blue MRN# [id number]   Age: 57 year old YOB: 1960     Primary pulmonologist  Date of Service: 8/1/2018  Admitting Team: [facility]         Assessment and Plan:   Mrs. Kecia Blue is a 58 y/o female with h/o dermatomyositis, seronegative RA, ILD and pulmonary hypertension who was admitted for emergent lung transplant workup on 2/21. [facility] and subsequently received a bilateral lung transplant on 3/1/18. Post operative course complicated by right main bronchial stenosis, positive DSAs and CMV viremia. She is admitted from clinic for acute diarrhea, elevated Cr, CMV viremia and  FTT.    # CMV viremia: CMV PCR peaked on 7/10/18 at 17,933, down to 802 as of 7/13. Increased again to 10,000 on 7/20/18. Valcyte resumed at 900 mg daily (secondary to renal function) on 7/25/18. Continues to complain of fatigue, FTT and now acute diarrhea. Concern for worsening CMV viremia +/- colitis.  - CMV PCR pending  - Resistance panel to be drawn  - Continue valcyte 900 mg daily for now  - Consider ID consult    # Oral lesions: on left lower lip area. Appear consistent with HSV.  - HSV PCR skin and serum ordered    # Acute diarrhea: X 4 days with liquid loose stools. No fever or chills, but consistently cold. No abdominal pain.]  - C diff PCR, O/P, enteric/virus panel ordered  - CMV PCR pending  - Enteric precautions  - Consider GI consult +/- colonoscopy    # Elevated Cr: Cr up to 1.38 from baseline around 0.9-1.0. BUN up to 27, suspect secondary to pre renal from both poor oral intake and recent diarrhea.   - Start  ml/hr  - Monitor       # S/p bilateral lung transplant: notes increased intermittently productive cough and wheezing which typically starts about a week after her bronch/dilation. No new dyspnea, but does get short of  breath with minimal activity. Hasn't been able to resume rehab since returning home, so feels there is a component of deconditioning. CXR reviewed by me today demonstrates stable transplant. PFTs improved from prior, FEV1 increased 8%, near her post transplant best Scheduled for bronch tomorrow.  - Continue immunosuppresion including mycophenolate 1500 mg BID, tacrolimus 5.5 mg in the am and 6 mg in the pm (goal 10-15) and prednisone taper of 7.5 in the am and 5 mg in the pm  - Current prophylaxis with Bactrim, Valcyte 900 mg daily (CMV D+/R+) and nystatin    # Right main bronchial stenosis s/p dilatation: s/p bronch on 7/10/18 with 2-3 mm stenosis filled with secretions and BI bronchomalacia s/p balloon dilation and washings. BAL grew out actinomyces and per Dr. Hale/ID, patient was started on Bactrim DS. Notes increased productive cough and wheezing, consistent with previous need for dilation.  - Scheduled for bronch in the OR tomorrow  - NPO after midnight  - Warfarin on hold, has been bridging with lovenox   - Continue pentoxyfilline and Bactrim 1 DS tablet daily for a total duration of one month    # Positive DSA: from 7/9 with DQB7 (up to 1576 from 1305)   - DSA pending for today    #Anemia: with hgb of 7.6 down from baseline 8-9s. No s/s of bleeding. Iron panel WNL, ferritin and B12 elevated as acute reactant. Suspect secondary to medications.  - Monitor      # Provoked LUE DVT: on ultrasound on 3/7 demonstrating occlusive thrombus in the left subclavian vein, axillary vein, basilic vein in the upper arm, cephalic vein near junction with the subclavian vein and cephalic vein from mid arm to the wrist. Repeat US on 4/16 with decreased subclavian thrombus and resolved axillary thrombus, but new (vs previously obscured) right IJ clot. On warfarin, goal INR 2-3.   - Coumadin on hold for bronch tomorrow, last lovenox injection today  - Will need at least 6 months of AC ( from US on 4/16)  - US prior to  "discontinuation      # High risk donor: will need 3 and 12 months labs. Do not see 3 month labs completed.  - Consider completing IP    # HTN: BP has been stable.   - Continue metoprolol 25 mg BID    # Left knee pain: with effusion following last day of rehab on the leg press. S/p steroid injection with slight improvement in pain, but still has a lot of joint instability, cannot walk well. Large barrier to her ongoing rehab.   - PT/OT to assess  - Consider f/u with Orthopedics OP     Chronic issus:  1. Dermatomyositis    Admission discussed with Dr. Figueroa. Admission orders placed by me, further orders per [facility].       CODE: full  Diet/IVF: regular/NPO after midnight; NS at 125 ml/hr  DVT ppx: ambulation; please resume/order anticoagulation upon completion of bronch  Disposition/Admission Status: > 2 midnights    Ame Chow  Pulmonary, Allergy, Critical Care and Sleep Medicine  Pager: [phone]         Chief Complaint:   I feel like crap         HPI:   Not feeling right, feels very tired, no energy, taking naps all day. Hasn't felt well since she got home, but notes increased ill feelings X a few weeks. Hasn't been able to do rehab at home yet. No fever or night sweats, notes chills, but feels actually more cold. No headaches, breathing is okay. Short of breath with stairs, not new or worse. Coughing and wheezing has worsened again a few weeks ago, intermittently productive, clear. No congestion or tightness, no chest pain, palpitations. No nausea or vomiting, some \"gurgling\" but no pain. Diarrhea X 4 days, Appetite is not great, not hungry, but forces herself to eat.               Past Medical History:     Past Medical History:   Diagnosis Date     Antisynthetase syndrome (H) 2014     Chronic cough      Dehydration 8/1/2018     Dermatomyositis (H)      Dysplasia of cervix, low grade (ESTRADA 1)      ILD (interstitial lung disease) (H)      Osteopenia      PONV (postoperative nausea and vomiting)      " Pulmonary hypertension      Raynaud's disease      Seronegative rheumatoid arthritis (H)             Past Surgical History:     Past Surgical History:   Procedure Laterality Date     BRONCHOSCOPY (RIGID OR FLEXIBLE), DIAGNOSTIC N/A 4/10/2018    Procedure: COMBINED BRONCHOSCOPY (RIGID OR FLEXIBLE), LAVAGE;;  Surgeon: Mariposa Donohue MD;  Location:  GI     BRONCHOSCOPY FLEXIBLE N/A 3/13/2018    Procedure: BRONCHOSCOPY FLEXIBLE;  Flexible Bronchoscopy ;  Surgeon: Gissell Sanchez MD;  Location:  GI     BRONCHOSCOPY FLEXIBLE N/A 5/9/2018    Procedure: BRONCHOSCOPY FLEXIBLE;;  Surgeon: Wilber Lin MD;  Location:  GI     BRONCHOSCOPY RIGID N/A 6/6/2018    Procedure: BRONCHOSCOPY RIGID;;  Surgeon: Lopez Macias MD;  Location:  GI     BRONCHOSCOPY, DILATE BRONCHUS, STENT BRONCHUS, COMBINED N/A 6/11/2018    Procedure: COMBINED BRONCHOSCOPY, DILATE BRONCHUS, STENT BRONCHUS;  Flexible Bronchoscopy, Balloon Dilation, Bronchial Washings;  Surgeon: Wilber Lin MD;  Location:  OR     BRONCHOSCOPY, DILATE BRONCHUS, STENT BRONCHUS, COMBINED Right 7/10/2018    Procedure: COMBINED BRONCHOSCOPY, DILATE BRONCHUS, STENT BRONCHUS;  Flexible Bronchoscopy, Balloon Dilation, Bronchial Washings  ;  Surgeon: Wilber Lin MD;  Location:  OR     ENT SURGERY      tonsillectomy as a child     INSERT EXTRACORPORAL MEMBRANE OXYGENATOR ADULT (OUTSIDE OR) N/A 2/27/2018    Procedure: INSERT EXTRACORPORAL MEMBRANE OXYGENATOR ADULT (OUTSIDE OR);  INSERT EXTRACORPORAL MEMBRANE OXYGENATOR ADULT (OUTSIDE OR) ;  Surgeon: Toby Hernandez MD;  Location:  OR     no prior surgery       REMOVE EXTRACORPORAL MEMBRANE OXYGENATOR ADULT N/A 3/3/2018    Procedure: REMOVE EXTRACORPORAL MEMBRANE OXYGENATOR ADULT;  Removal of Right Femoral Venous and Right Axillary Arterial Extracorporeal Membrane Oxygenator;  Surgeon: Toby Hernandez MD;  Location:  OR     TRANSPLANT LUNG RECIPIENT  SINGLE X2 Bilateral 3/1/2018    Procedure: TRANSPLANT LUNG RECIPIENT SINGLE X2;  Median Sternotomy, Extracorporeal Membrane Oxygenator, bilateral sequential lung transplant;  Surgeon: Toby Hernandez MD;  Location:  OR            Social History:     Social History     Social History     Marital status:      Spouse name: N/A     Number of children: N/A     Years of education: N/A     Occupational History     Not on file.     Social History Main Topics     Smoking status: Never Smoker     Smokeless tobacco: Never Used     Alcohol use No     Drug use: No     Sexual activity: Not on file     Other Topics Concern     Parent/Sibling W/ Cabg, Mi Or Angioplasty Before 65f 55m? No     Social History Narrative            Family History:     Family History   Problem Relation Age of Onset     Hypertension Mother      Arthritis Mother      Pancreatic Cancer Father      Diabetes Father             Immunizations:     Immunization History   Administered Date(s) Administered     Pneumo Conj 13-V (2010&after) 02/24/2016     Pneumococcal 23 valent 11/20/2014     Tdap (Adult) Unspecified Formulation 02/04/2008              Allergies:    No Known Allergies         Medications:     Cannot display prior to admission medications because the patient has not been admitted in this contact.             Review of Systems:   A complete ROS was performed and is negative other than what is stated in the HPI.          Physical Exam:   Last menstrual period 06/07/2014.  General: alert, mildly ill appearing  HEENT: NCAT, EOMI, anicteric sclera, nasal and oral mucosa moist without erythema or edema  Neck: supple, no cervical or supraclavicular adenopathy  Respiratory: moderate breath sounds, scattered inspiratory/expiratory wheezing bilaterally  CV: RRR, S1S2, no murmur noted  Abdomen/GI: normoactive BS, soft and non tender  Lymph: no edema, + digital clubbing  Neuro: AAO x 3, CN 2-12 grossly intact, ambulatory  Skin: no rash,  jaundice or lesions on limited exam         Data:   PFT interpretation:  Maneuver: valid and meets ATS guidelines  Severe obstruction with decreased FEV1 and FEV1/FVC  Compared to prior: FEV1 of 1.11 is 180 ml above prior  The decrease in FVC may represent air trapping v. restrictive physiology.  Lung volumes would be necessary to determine.

## 2018-08-01 NOTE — PROGRESS NOTES
D: Pt admitted 8/1/18 w/ CMV viremia, diarrhea, RADHA, FTT, and dehydration. S/p bilateral lung txp 3/1/18 c/b bronchial stenosis.     I: Monitored vitals and assessed pt status.   Changed: n/a  Running: NaCl @ 125 mL/hr - R PIV  PRN: n/a    A: A0x4. VSS on RA. Afebrile. Urinating adequately. Denies pain. Pt NPO @ midnight d/t bronch in am - pt aware. Up independently in room and tolerating well. Pt eating dinner in room with .    P: Continue to monitor Pt status and report changes to treatment team.

## 2018-08-01 NOTE — MR AVS SNAPSHOT
After Visit Summary   8/1/2018    Kecia Blue    MRN: [id number]           Patient Information     Date Of Birth          1960        Visit Information        Provider Department      8/1/2018 10:20 AM Ame Chow PA-C [facility]        Today's Diagnoses     ILD (interstitial lung disease) (H)    -  1    Lung transplant recipient (H)        Encounter for long-term (current) use of high-risk medication        Anemia, unspecified type        S/P lung transplant (H)        Cytomegalovirus (CMV) viremia (H)           Follow-ups after your visit        Your next 10 appointments already scheduled     Aug 02, 2018   Procedure with Wilber Lin MD   [facility], [place], Same Day Surgery (--)    [address] [facility] [address] [phone]            Aug 22, 2018  2:00 PM CDT   (Arrive by 1:45 PM)   New Patient Visit with Audra Snyder DO   [facility] ([facility])    [address] [place] [address]  [address]  [facility] [address] [phone]              Who to contact     If you have questions or need follow up information about today's clinic visit or your schedule please contact [facility] directly at [phone].  Normal or non-critical lab and imaging results will be communicated to you by MyChart, letter or phone within 4 business days after the clinic has received the results. If you do not hear from us within 7 days, please contact the clinic through MyChart or phone. If you have a critical or abnormal lab result, we will notify you by phone as soon as possible.  Submit refill requests through [employer] or call your pharmacy and they will forward the refill request to us. Please allow 3 business days for your refill to be completed.          Additional Information About Your Visit        [employer] Information     [employer] gives you secure access to  your electronic health record. If you see a primary care provider, you can also send messages to your care team and make appointments. If you have questions, please call your primary care clinic.  If you do not have a primary care provider, please call [phone] and they will assist you.        Care EveryWhere ID     This is your Care EveryWhere ID. This could be used by other organizations to access your [facility] medical records  [id number]        Your Vitals Were     Pulse Respirations Last Period Pulse Oximetry          89 16 06/07/2014 (Exact Date) 94%         Blood Pressure from Last 3 Encounters:   08/01/18 132/68   07/10/18 140/82   07/09/18 135/82    Weight from Last 3 Encounters:   07/10/18 53.7 kg (118 lb 6.2 oz)   07/09/18 54.3 kg (119 lb 11.2 oz)   06/11/18 52.8 kg (116 lb 6.5 oz)                 Today's Medication Changes          These changes are accurate as of 8/1/18  1:12 PM.  If you have any questions, ask your nurse or doctor.               These medicines have changed or have updated prescriptions.        Dose/Directions    VALCYTE 450 MG tablet   This may have changed:  how much to take   Used for:  Cytomegalovirus (CMV) viremia (H), ILD (interstitial lung disease) (H), Lung transplant recipient (H), Encounter for long-term (current) use of high-risk medication, Anemia, unspecified type, S/P lung transplant (H)   Generic drug:  valGANciclovir   Changed by:  Ame Chow PA-C        Dose:  900 mg   Take 2 tablets (900 mg) by mouth daily   Quantity:  30 tablet   Refills:  6         Stop taking these medicines if you haven't already. Please contact your care team if you have questions.     enoxaparin 60 MG/0.6ML injection   Commonly known as:  LOVENOX   Stopped by:  Ame Chow PA-C                    Primary Care Provider Office Phone # Fax #    Rosy Vu [phone]       [facility]  [address]  [facility] [address]        Equal  Access to Services     [facility] HospitalBRODERICK : Hadii aad ku hadryleyvadim Madelynto, wadarwinda luqpankaj, qaigor bobbirosmorro beauchamp. So [facility] [phone].    ATENCIÓN: Si kylela jordana, tiene a taylor disposición servicios gratuitos de asistencia lingüística. Llame al [phone].    We comply with applicable federal civil rights laws and [place] laws. We do not discriminate on the basis of race, color, national origin, age, disability, sex, sexual orientation, or gender identity.            Thank you!     Thank you for choosing [facility]  for your care. Our goal is always to provide you with excellent care. Hearing back from our patients is one way we can continue to improve our services. Please take a few minutes to complete the written survey that you may receive in the mail after your visit with us. Thank you!             Your Updated Medication List - Protect others around you: Learn how to safely use, store and throw away your medicines at www.disposemymeds.org.          This list is accurate as of 8/1/18  1:12 PM.  Always use your most recent med list.                   Brand Name Dispense Instructions for use Diagnosis    ACETAMINOPHEN PO      Take 1,000 mg by mouth every 6 hours as needed for pain        albuterol 108 (90 Base) MCG/ACT Inhaler    PROAIR HFA/PROVENTIL HFA/VENTOLIN HFA    1 Inhaler    Inhale 2 puffs into the lungs every 6 hours as needed for shortness of breath / dyspnea or wheezing    SOB (shortness of breath), Wheezing       calcium-vitamin D 600-400 MG-UNIT per tablet    CALTRATE     Take 1 tablet by mouth daily    S/P lung transplant (H), ILD (interstitial lung disease) (H), Lung transplant recipient (H), Encounter for long-term (current) use of high-risk medication, Anemia, unspecified type, Cytomegalovirus (CMV) viremia (H)       gabapentin 100 MG capsule    NEURONTIN    60 capsule    Take 1 capsule (100 mg) by mouth 2 times daily     Atypical chest pain, Lung transplant recipient (H)       levalbuterol 45 MCG/ACT Inhaler    XOPENEX HFA    1 Inhaler    Inhale 2 puffs into the lungs every 6 hours as needed for shortness of breath / dyspnea or wheezing    S/P lung transplant (H), Other constipation       magnesium oxide 400 MG tablet    MAG-OX    90 tablet    Take 1 tablet (400 mg) by mouth daily    Hypomagnesemia       metoprolol tartrate 25 MG tablet    LOPRESSOR    60 tablet    Take 1 tablet (25 mg) by mouth 2 times daily    Paroxysmal atrial fibrillation (H)       multivitamin, therapeutic with minerals Tabs tablet     30 each    Take 1 tablet by mouth daily    Lung transplant recipient (H)       mycophenolate 250 MG capsule    GENERIC EQUIVALENT    360 capsule    Take 6 capsules (1,500 mg) by mouth 2 times daily    S/P lung transplant (H)       nystatin 043437 UNIT/ML suspension    MYCOSTATIN    1200 mL    Take 10 mLs (1,000,000 Units) by mouth 4 times daily    Lung transplant recipient (H)       pantoprazole 40 MG EC tablet    PROTONIX    30 tablet    Take 1 tablet (40 mg) by mouth every morning    Gastroesophageal reflux disease without esophagitis       pentoxifylline 400 MG CR tablet    TRENtal    60 tablet    Take 1 tablet (400 mg) by mouth 2 times daily    Lung transplant recipient (H)       * predniSONE 2.5 MG tablet    DELTASONE    90 tablet    Take 1 tablet (2.5 mg) by mouth daily 6/22/18             7.5                    7.5 7/20/18             7.5                    5 8/24/18             5                       5 9/21/18             5                       2.5    Lung replaced by transplant (H)       * predniSONE 5 MG tablet    DELTASONE    180 tablet    Follow taper card    S/P lung transplant (H)       sulfamethoxazole-trimethoprim 800-160 MG per tablet    BACTRIM DS/SEPTRA DS    30 tablet    Take 1 tablet by mouth daily X one month.    Actinomyces infection, Lung replaced by transplant (H)       * tacrolimus 1 MG capsule     GENERIC EQUIVALENT    330 capsule    Take 5 mg in the AM and 6 mg in the PM along with one 0.5mg in the am for total dose 5.5mg am and 6mg pm.    S/P lung transplant (H), Other constipation       * tacrolimus 0.5 MG capsule    GENERIC EQUIVALENT    30 capsule    Take 1 capsule (0.5 mg) by mouth every morning Along with five 1mg capsules for total dose of 5.5mg am and 6mg pm.    S/P lung transplant (H)       VALCYTE 450 MG tablet   Generic drug:  valGANciclovir     30 tablet    Take 2 tablets (900 mg) by mouth daily    Cytomegalovirus (CMV) viremia (H), ILD (interstitial lung disease) (H), Lung transplant recipient (H), Encounter for long-term (current) use of high-risk medication, Anemia, unspecified type, S/P lung transplant (H)       warfarin 1 MG tablet    COUMADIN    140 tablet    Take 4mg MWFSat and 6mg TuThSun, or as directed by the [facility] at the [facility].    Status post lung transplantation (H), DVT (deep venous thrombosis) (H)       * Notice:  This list has 4 medication(s) that are the same as other medications prescribed for you. Read the directions carefully, and ask your doctor or other care provider to review them with you.

## 2018-08-02 ENCOUNTER — SURGERY (OUTPATIENT)
Age: 58
End: 2018-08-02

## 2018-08-02 ENCOUNTER — APPOINTMENT (OUTPATIENT)
Dept: PHYSICAL THERAPY | Facility: CLINIC | Age: 58
DRG: 167 | End: 2018-08-02
Attending: PHYSICIAN ASSISTANT
Payer: COMMERCIAL

## 2018-08-02 ENCOUNTER — ANESTHESIA (OUTPATIENT)
Dept: SURGERY | Facility: CLINIC | Age: 58
End: 2018-08-02

## 2018-08-02 ENCOUNTER — ANESTHESIA EVENT (OUTPATIENT)
Dept: SURGERY | Facility: CLINIC | Age: 58
End: 2018-08-02

## 2018-08-02 LAB
ABO + RH BLD: NORMAL
ABO + RH BLD: NORMAL
ALBUMIN SERPL-MCNC: 2.6 G/DL (ref 3.4–5)
ALP SERPL-CCNC: 87 U/L (ref 40–150)
ALT SERPL W P-5'-P-CCNC: 21 U/L (ref 0–50)
ANION GAP SERPL CALCULATED.3IONS-SCNC: 8 MMOL/L (ref 3–14)
APPEARANCE FLD: NORMAL
AST SERPL W P-5'-P-CCNC: 15 U/L (ref 0–45)
BASOPHILS # BLD AUTO: 0 10E9/L (ref 0–0.2)
BASOPHILS NFR BLD AUTO: 0 %
BILIRUB DIRECT SERPL-MCNC: 0.1 MG/DL (ref 0–0.2)
BILIRUB SERPL-MCNC: 0.2 MG/DL (ref 0.2–1.3)
BLD GP AB SCN SERPL QL: NORMAL
BLD PROD TYP BPU: NORMAL
BLD PROD TYP BPU: NORMAL
BLD UNIT ID BPU: 0
BLOOD BANK CMNT PATIENT-IMP: NORMAL
BLOOD PRODUCT CODE: NORMAL
BPU ID: NORMAL
BUN SERPL-MCNC: 18 MG/DL (ref 7–30)
CALCIUM SERPL-MCNC: 8.1 MG/DL (ref 8.5–10.1)
CHLORIDE SERPL-SCNC: 108 MMOL/L (ref 94–109)
CMV DNA SPEC NAA+PROBE-ACNC: 6171 [IU]/ML
CMV DNA SPEC NAA+PROBE-LOG#: 3.8 {LOG_IU}/ML
CO2 SERPL-SCNC: 21 MMOL/L (ref 20–32)
COLOR FLD: COLORLESS
CREAT SERPL-MCNC: 0.96 MG/DL (ref 0.52–1.04)
DIFFERENTIAL METHOD BLD: ABNORMAL
DONOR IDENTIFICATION: NORMAL
DQB7: 800
DSA COMMENTS: NORMAL
DSA PRESENT: YES
DSA TEST METHOD: NORMAL
EBV DNA # SPEC NAA+PROBE: NORMAL {COPIES}/ML
EBV DNA SPEC NAA+PROBE-LOG#: NORMAL {LOG_COPIES}/ML
EOSINOPHIL # BLD AUTO: 0 10E9/L (ref 0–0.7)
EOSINOPHIL NFR BLD AUTO: 0.2 %
ERYTHROCYTE [DISTWIDTH] IN BLOOD BY AUTOMATED COUNT: 14.7 % (ref 10–15)
GFR SERPL CREATININE-BSD FRML MDRD: 60 ML/MIN/1.7M2
GLUCOSE SERPL-MCNC: 110 MG/DL (ref 70–99)
GRAM STN SPEC: ABNORMAL
GRAM STN SPEC: ABNORMAL
HCT VFR BLD AUTO: 21.6 % (ref 35–47)
HGB BLD-MCNC: 6.5 G/DL (ref 11.7–15.7)
HGB BLD-MCNC: 6.5 G/DL (ref 11.7–15.7)
HGB BLD-MCNC: 7.7 G/DL (ref 11.7–15.7)
HSV1 DNA SPEC QL NAA+PROBE: NEGATIVE
HSV2 DNA SPEC QL NAA+PROBE: NEGATIVE
IMM GRANULOCYTES # BLD: 0.1 10E9/L (ref 0–0.4)
IMM GRANULOCYTES NFR BLD: 2.1 %
KOH PREP SPEC: NORMAL
LDH SERPL L TO P-CCNC: 200 U/L (ref 81–234)
LYMPHOCYTES # BLD AUTO: 0.2 10E9/L (ref 0.8–5.3)
LYMPHOCYTES NFR BLD AUTO: 3.1 %
LYMPHOCYTES NFR FLD MANUAL: 2 %
MCH RBC QN AUTO: 29.4 PG (ref 26.5–33)
MCHC RBC AUTO-ENTMCNC: 30.1 G/DL (ref 31.5–36.5)
MCV RBC AUTO: 98 FL (ref 78–100)
MONOCYTES # BLD AUTO: 0.1 10E9/L (ref 0–1.3)
MONOCYTES NFR BLD AUTO: 1 %
MONOS+MACROS NFR FLD MANUAL: 21 %
NEUTROPHILS # BLD AUTO: 5.7 10E9/L (ref 1.6–8.3)
NEUTROPHILS NFR BLD AUTO: 93.6 %
NEUTS BAND NFR FLD MANUAL: 77 %
NRBC # BLD AUTO: 0 10*3/UL
NRBC BLD AUTO-RTO: 0 /100
NUM BPU REQUESTED: 1
ORGAN: NORMAL
PLATELET # BLD AUTO: 301 10E9/L (ref 150–450)
POTASSIUM SERPL-SCNC: 4.4 MMOL/L (ref 3.4–5.3)
PRA DONOR SPECIFIC ABY: NORMAL
PROT SERPL-MCNC: 6.2 G/DL (ref 6.8–8.8)
RBC # BLD AUTO: 2.21 10E12/L (ref 3.8–5.2)
SA1 CELL: NORMAL
SA1 COMMENTS: NORMAL
SA1 HI RISK ABY: NORMAL
SA1 MOD RISK ABY: NORMAL
SA1 TEST METHOD: NORMAL
SA2 CELL: NORMAL
SA2 COMMENTS: NORMAL
SA2 HI RISK ABY UA: NORMAL
SA2 MOD RISK ABY: NORMAL
SA2 TEST METHOD: NORMAL
SODIUM SERPL-SCNC: 137 MMOL/L (ref 133–144)
SPECIMEN EXP DATE BLD: NORMAL
SPECIMEN SOURCE FLD: NORMAL
SPECIMEN SOURCE: ABNORMAL
SPECIMEN SOURCE: ABNORMAL
SPECIMEN SOURCE: NORMAL
SPECIMEN SOURCE: NORMAL
TRANSFUSION STATUS PATIENT QL: NORMAL
TRANSFUSION STATUS PATIENT QL: NORMAL
TSH SERPL DL<=0.005 MIU/L-ACNC: 1.8 MU/L (ref 0.4–4)
WBC # BLD AUTO: 6.1 10E9/L (ref 4–11)
WBC # FLD AUTO: 5675 /UL

## 2018-08-02 PROCEDURE — 25000132 ZZH RX MED GY IP 250 OP 250 PS 637: Performed by: PHYSICIAN ASSISTANT

## 2018-08-02 PROCEDURE — 97110 THERAPEUTIC EXERCISES: CPT | Mod: GP

## 2018-08-02 PROCEDURE — 80076 HEPATIC FUNCTION PANEL: CPT | Performed by: ANESTHESIOLOGY

## 2018-08-02 PROCEDURE — 87081 CULTURE SCREEN ONLY: CPT | Performed by: INTERNAL MEDICINE

## 2018-08-02 PROCEDURE — 86850 RBC ANTIBODY SCREEN: CPT | Performed by: ANESTHESIOLOGY

## 2018-08-02 PROCEDURE — 25000125 ZZHC RX 250: Performed by: PHYSICIAN ASSISTANT

## 2018-08-02 PROCEDURE — 87015 SPECIMEN INFECT AGNT CONCNTJ: CPT | Performed by: INTERNAL MEDICINE

## 2018-08-02 PROCEDURE — P9016 RBC LEUKOCYTES REDUCED: HCPCS | Performed by: ANESTHESIOLOGY

## 2018-08-02 PROCEDURE — 87205 SMEAR GRAM STAIN: CPT | Performed by: INTERNAL MEDICINE

## 2018-08-02 PROCEDURE — 36415 COLL VENOUS BLD VENIPUNCTURE: CPT | Performed by: ANESTHESIOLOGY

## 2018-08-02 PROCEDURE — 89051 BODY FLUID CELL COUNT: CPT | Performed by: INTERNAL MEDICINE

## 2018-08-02 PROCEDURE — 25000132 ZZH RX MED GY IP 250 OP 250 PS 637: Performed by: ANESTHESIOLOGY

## 2018-08-02 PROCEDURE — 25000128 H RX IP 250 OP 636: Performed by: PHYSICIAN ASSISTANT

## 2018-08-02 PROCEDURE — 88312 SPECIAL STAINS GROUP 1: CPT | Performed by: INTERNAL MEDICINE

## 2018-08-02 PROCEDURE — 97161 PT EVAL LOW COMPLEX 20 MIN: CPT | Mod: GP

## 2018-08-02 PROCEDURE — 87076 CULTURE ANAEROBE IDENT EACH: CPT | Performed by: INTERNAL MEDICINE

## 2018-08-02 PROCEDURE — 25000125 ZZHC RX 250: Performed by: INTERNAL MEDICINE

## 2018-08-02 PROCEDURE — 0B9K8ZX DRAINAGE OF RIGHT LUNG, VIA NATURAL OR ARTIFICIAL OPENING ENDOSCOPIC, DIAGNOSTIC: ICD-10-PCS | Performed by: INTERNAL MEDICINE

## 2018-08-02 PROCEDURE — 85018 HEMOGLOBIN: CPT | Performed by: INTERNAL MEDICINE

## 2018-08-02 PROCEDURE — 25000131 ZZH RX MED GY IP 250 OP 636 PS 637: Performed by: PHYSICIAN ASSISTANT

## 2018-08-02 PROCEDURE — 36000059 ZZH SURGERY LEVEL 3 EA 15 ADDTL MIN UMMC: Performed by: INTERNAL MEDICINE

## 2018-08-02 PROCEDURE — 83615 LACTATE (LD) (LDH) ENZYME: CPT | Performed by: ANESTHESIOLOGY

## 2018-08-02 PROCEDURE — 85018 HEMOGLOBIN: CPT | Performed by: ANESTHESIOLOGY

## 2018-08-02 PROCEDURE — 0B768ZZ DILATION OF RIGHT LOWER LOBE BRONCHUS, VIA NATURAL OR ARTIFICIAL OPENING ENDOSCOPIC: ICD-10-PCS | Performed by: INTERNAL MEDICINE

## 2018-08-02 PROCEDURE — 87102 FUNGUS ISOLATION CULTURE: CPT | Performed by: INTERNAL MEDICINE

## 2018-08-02 PROCEDURE — 87077 CULTURE AEROBIC IDENTIFY: CPT | Performed by: INTERNAL MEDICINE

## 2018-08-02 PROCEDURE — 87209 SMEAR COMPLEX STAIN: CPT | Performed by: INTERNAL MEDICINE

## 2018-08-02 PROCEDURE — 87633 RESP VIRUS 12-25 TARGETS: CPT | Performed by: INTERNAL MEDICINE

## 2018-08-02 PROCEDURE — 87210 SMEAR WET MOUNT SALINE/INK: CPT | Performed by: INTERNAL MEDICINE

## 2018-08-02 PROCEDURE — 25000128 H RX IP 250 OP 636: Performed by: NURSE ANESTHETIST, CERTIFIED REGISTERED

## 2018-08-02 PROCEDURE — 40000193 ZZH STATISTIC PT WARD VISIT

## 2018-08-02 PROCEDURE — 87116 MYCOBACTERIA CULTURE: CPT | Performed by: INTERNAL MEDICINE

## 2018-08-02 PROCEDURE — 87493 C DIFF AMPLIFIED PROBE: CPT | Performed by: INTERNAL MEDICINE

## 2018-08-02 PROCEDURE — 97530 THERAPEUTIC ACTIVITIES: CPT | Mod: GP

## 2018-08-02 PROCEDURE — 87506 IADNA-DNA/RNA PROBE TQ 6-11: CPT | Performed by: INTERNAL MEDICINE

## 2018-08-02 PROCEDURE — 86901 BLOOD TYPING SEROLOGIC RH(D): CPT | Performed by: ANESTHESIOLOGY

## 2018-08-02 PROCEDURE — 40000170 ZZH STATISTIC PRE-PROCEDURE ASSESSMENT II: Performed by: INTERNAL MEDICINE

## 2018-08-02 PROCEDURE — 0B758ZZ DILATION OF RIGHT MIDDLE LOBE BRONCHUS, VIA NATURAL OR ARTIFICIAL OPENING ENDOSCOPIC: ICD-10-PCS | Performed by: INTERNAL MEDICINE

## 2018-08-02 PROCEDURE — 87070 CULTURE OTHR SPECIMN AEROBIC: CPT | Performed by: INTERNAL MEDICINE

## 2018-08-02 PROCEDURE — 37000008 ZZH ANESTHESIA TECHNICAL FEE, 1ST 30 MIN: Performed by: INTERNAL MEDICINE

## 2018-08-02 PROCEDURE — 87206 SMEAR FLUORESCENT/ACID STAI: CPT | Performed by: INTERNAL MEDICINE

## 2018-08-02 PROCEDURE — 25000566 ZZH SEVOFLURANE, EA 15 MIN: Performed by: INTERNAL MEDICINE

## 2018-08-02 PROCEDURE — 80048 BASIC METABOLIC PNL TOTAL CA: CPT | Performed by: INTERNAL MEDICINE

## 2018-08-02 PROCEDURE — 86923 COMPATIBILITY TEST ELECTRIC: CPT | Performed by: ANESTHESIOLOGY

## 2018-08-02 PROCEDURE — C1726 CATH, BAL DIL, NON-VASCULAR: HCPCS | Performed by: INTERNAL MEDICINE

## 2018-08-02 PROCEDURE — 25000131 ZZH RX MED GY IP 250 OP 636 PS 637: Performed by: INTERNAL MEDICINE

## 2018-08-02 PROCEDURE — 37000009 ZZH ANESTHESIA TECHNICAL FEE, EACH ADDTL 15 MIN: Performed by: INTERNAL MEDICINE

## 2018-08-02 PROCEDURE — 25000125 ZZHC RX 250: Performed by: ANESTHESIOLOGY

## 2018-08-02 PROCEDURE — 71000014 ZZH RECOVERY PHASE 1 LEVEL 2 FIRST HR: Performed by: INTERNAL MEDICINE

## 2018-08-02 PROCEDURE — 88108 CYTOPATH CONCENTRATE TECH: CPT | Performed by: INTERNAL MEDICINE

## 2018-08-02 PROCEDURE — 36415 COLL VENOUS BLD VENIPUNCTURE: CPT | Performed by: INTERNAL MEDICINE

## 2018-08-02 PROCEDURE — 36000057 ZZH SURGERY LEVEL 3 1ST 30 MIN - UMMC: Performed by: INTERNAL MEDICINE

## 2018-08-02 PROCEDURE — 86900 BLOOD TYPING SEROLOGIC ABO: CPT | Performed by: ANESTHESIOLOGY

## 2018-08-02 PROCEDURE — C9399 UNCLASSIFIED DRUGS OR BIOLOG: HCPCS | Performed by: NURSE ANESTHETIST, CERTIFIED REGISTERED

## 2018-08-02 PROCEDURE — 27210794 ZZH OR GENERAL SUPPLY STERILE: Performed by: INTERNAL MEDICINE

## 2018-08-02 PROCEDURE — 87177 OVA AND PARASITES SMEARS: CPT | Performed by: INTERNAL MEDICINE

## 2018-08-02 PROCEDURE — 85025 COMPLETE CBC W/AUTO DIFF WBC: CPT | Performed by: INTERNAL MEDICINE

## 2018-08-02 PROCEDURE — 25000125 ZZHC RX 250: Performed by: NURSE ANESTHETIST, CERTIFIED REGISTERED

## 2018-08-02 PROCEDURE — 21400006 ZZH R&B CCU INTERMEDIATE UMMC

## 2018-08-02 RX ORDER — FENTANYL CITRATE 50 UG/ML
25-50 INJECTION, SOLUTION INTRAMUSCULAR; INTRAVENOUS EVERY 5 MIN PRN
Status: DISCONTINUED | OUTPATIENT
Start: 2018-08-02 | End: 2018-08-02 | Stop reason: HOSPADM

## 2018-08-02 RX ORDER — ACETAMINOPHEN 325 MG/1
975 TABLET ORAL ONCE
Status: COMPLETED | OUTPATIENT
Start: 2018-08-02 | End: 2018-08-02

## 2018-08-02 RX ORDER — SODIUM CHLORIDE 9 MG/ML
INJECTION, SOLUTION INTRAVENOUS CONTINUOUS PRN
Status: DISCONTINUED | OUTPATIENT
Start: 2018-08-02 | End: 2018-08-02

## 2018-08-02 RX ORDER — ONDANSETRON 2 MG/ML
INJECTION INTRAMUSCULAR; INTRAVENOUS PRN
Status: DISCONTINUED | OUTPATIENT
Start: 2018-08-02 | End: 2018-08-02

## 2018-08-02 RX ORDER — DEXAMETHASONE SODIUM PHOSPHATE 4 MG/ML
INJECTION, SOLUTION INTRA-ARTICULAR; INTRALESIONAL; INTRAMUSCULAR; INTRAVENOUS; SOFT TISSUE PRN
Status: DISCONTINUED | OUTPATIENT
Start: 2018-08-02 | End: 2018-08-02

## 2018-08-02 RX ORDER — FENTANYL CITRATE 50 UG/ML
INJECTION, SOLUTION INTRAMUSCULAR; INTRAVENOUS PRN
Status: DISCONTINUED | OUTPATIENT
Start: 2018-08-02 | End: 2018-08-02

## 2018-08-02 RX ORDER — IPRATROPIUM BROMIDE AND ALBUTEROL SULFATE 2.5; .5 MG/3ML; MG/3ML
3 SOLUTION RESPIRATORY (INHALATION) ONCE
Status: COMPLETED | OUTPATIENT
Start: 2018-08-02 | End: 2018-08-02

## 2018-08-02 RX ORDER — PROPOFOL 10 MG/ML
INJECTION, EMULSION INTRAVENOUS PRN
Status: DISCONTINUED | OUTPATIENT
Start: 2018-08-02 | End: 2018-08-02

## 2018-08-02 RX ORDER — HYDROMORPHONE HYDROCHLORIDE 1 MG/ML
.3-.5 INJECTION, SOLUTION INTRAMUSCULAR; INTRAVENOUS; SUBCUTANEOUS EVERY 10 MIN PRN
Status: DISCONTINUED | OUTPATIENT
Start: 2018-08-02 | End: 2018-08-02 | Stop reason: HOSPADM

## 2018-08-02 RX ORDER — ONDANSETRON 2 MG/ML
4 INJECTION INTRAMUSCULAR; INTRAVENOUS EVERY 30 MIN PRN
Status: DISCONTINUED | OUTPATIENT
Start: 2018-08-02 | End: 2018-08-02 | Stop reason: HOSPADM

## 2018-08-02 RX ORDER — NALOXONE HYDROCHLORIDE 0.4 MG/ML
.1-.4 INJECTION, SOLUTION INTRAMUSCULAR; INTRAVENOUS; SUBCUTANEOUS
Status: ACTIVE | OUTPATIENT
Start: 2018-08-02 | End: 2018-08-03

## 2018-08-02 RX ORDER — SODIUM CHLORIDE 9 MG/ML
INJECTION, SOLUTION INTRAVENOUS CONTINUOUS
Status: DISCONTINUED | OUTPATIENT
Start: 2018-08-02 | End: 2018-08-02 | Stop reason: HOSPADM

## 2018-08-02 RX ORDER — VALGANCICLOVIR 450 MG/1
900 TABLET, FILM COATED ORAL DAILY
Status: DISCONTINUED | OUTPATIENT
Start: 2018-08-03 | End: 2018-08-04 | Stop reason: HOSPADM

## 2018-08-02 RX ORDER — PROPOFOL 10 MG/ML
INJECTION, EMULSION INTRAVENOUS CONTINUOUS PRN
Status: DISCONTINUED | OUTPATIENT
Start: 2018-08-02 | End: 2018-08-02

## 2018-08-02 RX ORDER — MEPERIDINE HYDROCHLORIDE 50 MG/ML
12.5 INJECTION INTRAMUSCULAR; INTRAVENOUS; SUBCUTANEOUS EVERY 5 MIN PRN
Status: DISCONTINUED | OUTPATIENT
Start: 2018-08-02 | End: 2018-08-02 | Stop reason: HOSPADM

## 2018-08-02 RX ORDER — ONDANSETRON 4 MG/1
4 TABLET, ORALLY DISINTEGRATING ORAL EVERY 30 MIN PRN
Status: DISCONTINUED | OUTPATIENT
Start: 2018-08-02 | End: 2018-08-02 | Stop reason: HOSPADM

## 2018-08-02 RX ADMIN — NYSTATIN 1000000 UNITS: 100000 SUSPENSION ORAL at 07:29

## 2018-08-02 RX ADMIN — MYCOPHENOLATE MOFETIL 1500 MG: 250 CAPSULE ORAL at 07:29

## 2018-08-02 RX ADMIN — DEXAMETHASONE SODIUM PHOSPHATE 6 MG: 4 INJECTION, SOLUTION INTRA-ARTICULAR; INTRALESIONAL; INTRAMUSCULAR; INTRAVENOUS; SOFT TISSUE at 13:58

## 2018-08-02 RX ADMIN — SODIUM CHLORIDE: 9 INJECTION, SOLUTION INTRAVENOUS at 02:22

## 2018-08-02 RX ADMIN — MYCOPHENOLATE MOFETIL 1500 MG: 250 CAPSULE ORAL at 19:36

## 2018-08-02 RX ADMIN — PREDNISONE 5 MG: 5 TABLET ORAL at 19:37

## 2018-08-02 RX ADMIN — PENTOXIFYLLINE 400 MG: 400 TABLET, FILM COATED, EXTENDED RELEASE ORAL at 19:37

## 2018-08-02 RX ADMIN — METOPROLOL TARTRATE 25 MG: 25 TABLET ORAL at 19:37

## 2018-08-02 RX ADMIN — Medication 1 TABLET: at 07:29

## 2018-08-02 RX ADMIN — PROPOFOL 120 MCG/KG/MIN: 10 INJECTION, EMULSION INTRAVENOUS at 13:45

## 2018-08-02 RX ADMIN — PENTOXIFYLLINE 400 MG: 400 TABLET, FILM COATED, EXTENDED RELEASE ORAL at 07:29

## 2018-08-02 RX ADMIN — SODIUM CHLORIDE: 9 INJECTION, SOLUTION INTRAVENOUS at 13:30

## 2018-08-02 RX ADMIN — VALGANCICLOVIR 450 MG: 450 TABLET, FILM COATED ORAL at 07:29

## 2018-08-02 RX ADMIN — ROCURONIUM BROMIDE 50 MG: 10 INJECTION INTRAVENOUS at 13:44

## 2018-08-02 RX ADMIN — FENTANYL CITRATE 100 MCG: 50 INJECTION, SOLUTION INTRAMUSCULAR; INTRAVENOUS at 13:42

## 2018-08-02 RX ADMIN — PANTOPRAZOLE SODIUM 40 MG: 40 TABLET, DELAYED RELEASE ORAL at 07:29

## 2018-08-02 RX ADMIN — MAGNESIUM OXIDE TAB 400 MG (241.3 MG ELEMENTAL MG) 400 MG: 400 (241.3 MG) TAB at 07:29

## 2018-08-02 RX ADMIN — MULTIPLE VITAMINS W/ MINERALS TAB 1 TABLET: TAB at 07:29

## 2018-08-02 RX ADMIN — SULFAMETHOXAZOLE AND TRIMETHOPRIM 1 TABLET: 800; 160 TABLET ORAL at 16:22

## 2018-08-02 RX ADMIN — NYSTATIN 1000000 UNITS: 100000 SUSPENSION ORAL at 19:36

## 2018-08-02 RX ADMIN — PREDNISONE 7.5 MG: 5 TABLET ORAL at 07:29

## 2018-08-02 RX ADMIN — TACROLIMUS 5.5 MG: 5 CAPSULE ORAL at 07:29

## 2018-08-02 RX ADMIN — PROPOFOL: 10 INJECTION, EMULSION INTRAVENOUS at 14:00

## 2018-08-02 RX ADMIN — NYSTATIN 1000000 UNITS: 100000 SUSPENSION ORAL at 16:23

## 2018-08-02 RX ADMIN — ACETAMINOPHEN 975 MG: 325 TABLET, FILM COATED ORAL at 12:26

## 2018-08-02 RX ADMIN — SUGAMMADEX 150 MG: 100 INJECTION, SOLUTION INTRAVENOUS at 14:08

## 2018-08-02 RX ADMIN — ONDANSETRON 4 MG: 2 INJECTION INTRAMUSCULAR; INTRAVENOUS at 13:58

## 2018-08-02 RX ADMIN — MIDAZOLAM 2 MG: 1 INJECTION INTRAMUSCULAR; INTRAVENOUS at 13:30

## 2018-08-02 RX ADMIN — PROPOFOL 100 MG: 10 INJECTION, EMULSION INTRAVENOUS at 13:42

## 2018-08-02 RX ADMIN — IPRATROPIUM BROMIDE AND ALBUTEROL SULFATE 3 ML: .5; 3 SOLUTION RESPIRATORY (INHALATION) at 12:27

## 2018-08-02 RX ADMIN — TACROLIMUS 6 MG: 5 CAPSULE ORAL at 19:36

## 2018-08-02 RX ADMIN — METOPROLOL TARTRATE 25 MG: 25 TABLET ORAL at 07:29

## 2018-08-02 ASSESSMENT — ACTIVITIES OF DAILY LIVING (ADL)
ADLS_ACUITY_SCORE: 10

## 2018-08-02 NOTE — PROGRESS NOTES
Transfer        3:41 PM  ---------------------------------------------------------------------  Transferred to/from: from PACU to   Via: Bed  Reason for transfer: post OR bronch    VSS on RA. Denies pain. Capnography in use. Pt resting comfortably in bed.      Temp:  [97.5  F (36.4  C)-99  F (37.2  C)] 97.5  F (36.4  C)  Heart Rate:  [87-98] 88  Resp:  [14-28] 20  BP: (116-142)/(71-99) 128/78  SpO2:  [95 %-100 %] 99 %

## 2018-08-02 NOTE — PLAN OF CARE
Problem: Patient Care Overview  Goal: Plan of Care/Patient Progress Review  OT/6C: Evaluation order received. Per discussion with PT, pt is independent with all ADLs and presents with no OT needs. PT will continue to follow for aerobic conditioning and strengthening, OT to defer. Please re-consult if change in medical status.

## 2018-08-02 NOTE — ANESTHESIA CARE TRANSFER NOTE
Patient: Kecia Blue    Procedure(s):  Flexible Bronchoscopy, Bronchial Washings, Balloon Dilation - Wound Class: II-Clean Contaminated    Diagnosis: History Lung Transplant   Diagnosis Additional Information: No value filed.    Anesthesia Type:   General, ETT     Note:  Airway :Face Mask  Patient transferred to:PACU  Comments:   Spontaneous respirations, oral suctioned, bilateral eye opening and hand grasps.  Extubated to FM O2 6lpm.  VSS to PACU.      Vitals: (Last set prior to Anesthesia Care Transfer)    CRNA VITALS  8/2/2018 1343 - 8/2/2018 1423      8/2/2018             Resp Rate (observed): 22                Electronically Signed By: ADRIÁN Akbar CRNA  August 2, 2018  2:23 PM

## 2018-08-02 NOTE — PROCEDURES
Procedure(s):    Bronchoscopy  Balloon Dilation (1 sites)  Bronchial wash    Indication:  Right anastomosis stenosis, s/p balloon dilations x 2 so far    Attending of Record:     DA Lin MD    Trainees Present:   Preet Patel MD    Medications:    General Anesthesia - See anesthesia flowsheet for details    Sedation Time:  Per Anesthesia Care Provider    Time Out:  Performed    The patient's medical record has been reviewed.  The indication for the procedure was reviewed.  The necessary history and physical examination was performed and reviewed.  The risks, benefits and alternatives of the procedure were discussed with the the patient in detail and she had the opportunity to ask questions.  I discussed in particular the potential complications including risks of minor or life-threatening bleeding and/or infection, respiratory failure, vocal cord trauma / paralysis, pneumothorax, and discomfort. Sedation risks were also discussed including abnormal heart rhythms, low blood pressure, and respiratory failure. All questions were answered to the best of my ability.  Verbal and written informed consent was obtained.  The proposed procedure and the patient's identification were verified prior to the procedure by the physician and the nurse.    The patient was assessed for the adequacy for the procedure and to receive medications.   Mental Status:  Alert and oriented x 3  Airway examination:  Class I (Complete visualization of soft palate)  Pulmonary:  Clear to ausculation bilaterally  CV:  RRR, no murmurs or gallops  ASA Grade:  (II)  Mild systemic disease    After clinical evaluation and reviewing the indication, risks, alternatives and benefits of the procedure the patient was deemed to be in satisfactory condition to undergo the procedure.      A Tuberculosis risk assessment was performed:  The patient has no known RISK of Tuberculosis    The procedure was performed in a negative airflow room: No. The  reason the patient could not be moved to a negative airflow room was no TB risk  Maneuvers / Procedure:     The bronchoscope was inserted through the mouth via ETT.      Airway Examination:  A complete airway examination was performed from the distal trachea to the subsegmental level in each lobe of both lungs.  Pertinent findings include anastomosis stenosis on the right site. There was bronchomalacia in BI Therapeutic scope could not be advanced initially. After dilation all lobar segments looked normal.       Bronchial wash: performed in the right site and creamy colored suctioned/collected for cytology and cultures. 40 ml saline instilled and 30 ml recovered.            Balloon Dilation: performed by using inflation device and Elation balloon 8 mm to 10 mm in the right anastomosis and BI. No obvious mucosal tear. Dilated up to 10 mm.    Any disposable equipment was visually inspected and deemed to be intact immediately post procedure.      R anastomosis stenosis      RML and RLL      Post dilation R anastomosis              Recommendations:     -->  Back to floor to resume management of diarrhea  -->  Repeat bronchoscopy in 2-3 weeks time

## 2018-08-02 NOTE — PLAN OF CARE
Problem: Patient Care Overview  Goal: Plan of Care/Patient Progress Review  Outcome: No Change  D: Admitted from clinic d/t FTT, diarrhea, RADHA, CMV viremia. Hx: Lung tx 3/01/2018.     I: NS at 125 ml/hr. NPO since midnight. Enteric isolation.     A: VSS, A&O, up independently. Pt denies pain and SOB, oxygen stable on RA. Tele SR, HR 80-90's. Adequate urine output overnight. Slept well between cares.     P: Bronchoscopy today. Will continue to monitor and notify MD of pertinent changes.

## 2018-08-02 NOTE — OR NURSING
HGB re draw came back at 6.5.  Dr. Freeman ordered a type and screen and one unit of PRBCs.  Plan for pt to receive blood in OR when it is available.  Hand off to Lina Shi RN.

## 2018-08-02 NOTE — PROGRESS NOTES
08/02/18 1138   Quick Adds   Type of Visit Initial PT Evaluation   Living Environment   Lives With spouse   Living Arrangements house   Home Accessibility stairs to enter home   Number of Stairs to Enter Home 3  (also has ramped entrance)   Number of Stairs Within Home 0   Stair Railings at Home inside, present on left side   Living Environment Comment lives with spouse in house with ramped entrance, no stairs to perform.  assist with laundry and higher level iADLs as needed   Self-Care   Dominant Hand right   Usual Activity Tolerance fair   Current Activity Tolerance fair   Regular Exercise no   Equipment Currently Used at Home none   Activity/Exercise/Self-Care Comment indep with ADLs   Functional Level Prior   Ambulation 0-->independent   Transferring 0-->independent   Toileting 0-->independent   Bathing 0-->independent   Dressing 0-->independent   Eating 0-->independent   Communication 0-->understands/communicates without difficulty   Swallowing 0-->swallows foods/liquids without difficulty   Cognition 0 - no cognition issues reported   Fall history within last six months no   Which of the above functional risks had a recent onset or change? none   Prior Functional Level Comment patient is indep with functional mobility   General Information   Onset of Illness/Injury or Date of Surgery - Date 03/01/18  (lung transplant)   Referring Physician Ame Chow PA-C   Patient/Family Goals Statement to return home; eager to start aquatic therapy   Pertinent History of Current Problem (include personal factors and/or comorbidities that impact the POC) Mrs. Kecia Blue is a 58 y/o female with h/o dermatomyositis, seronegative RA, ILD and pulmonary hypertension who was admitted for emergent lung transplant workup on 2/21. [facility] and subsequently received a bilateral lung transplant on 3/1/18. Post operative course complicated by right main bronchial  stenosis, positive DSAs and CMV viremia. She is admitted from clinic for acute diarrhea, elevated Cr, CMV viremia and  FTT.   Precautions/Limitations immunosuppressed  (yellow mask in [place])   General Info Comments activity: up ad diego   Cognitive Status Examination   Orientation orientation to person, place and time   Level of Consciousness alert   Follows Commands and Answers Questions 100% of the time;able to follow multistep instructions   Personal Safety and Judgment intact   Memory intact   Pain Assessment   Patient Currently in Pain No   Integumentary/Edema   Integumentary/Edema no deficits were identifed   Posture    Posture Not impaired   Range of Motion (ROM)   ROM Quick Adds No deficits were identified   Strength   Strength Comments Good functional strength with bed mobility and transfers   Bed Mobility   Bed Mobility Comments indep   Transfer Skills   Transfer Comments indep   Gait   Gait Comments SBA; limited by shortness of breath   Balance   Balance Comments dynamic balance good   Sensory Examination   Sensory Perception no deficits were identified   Coordination   Coordination no deficits were identified   Muscle Tone   Muscle Tone no deficits were identified   General Therapy Interventions   Planned Therapy Interventions gait training;strengthening;risk factor education;home program guidelines;progressive activity/exercise   Clinical Impression   Criteria for Skilled Therapeutic Intervention yes, treatment indicated   PT Diagnosis decreased tolerance to activity   Influenced by the following impairments decreased activity tolerance, pain, functional weakness   Functional limitations due to impairments decreased indep with functional mobility   Clinical Presentation Evolving/Changing   Clinical Presentation Rationale PMH   Clinical Decision Making (Complexity) Low complexity   Therapy Frequency` 3 times/week   Predicted Duration of Therapy Intervention (days/wks) 1 week   Anticipated Equipment Needs at  "Discharge (none anticipated)   Anticipated Discharge Disposition Home with Outpatient Therapy   Risk & Benefits of therapy have been explained Yes   Patient, Family & other staff in agreement with plan of care Yes   [facility] TM \"6 Clicks\"   2016, Trustees of [facility], under license to [employer].  All rights reserved.   6 Clicks Short Forms Basic Mobility Inpatient Short Form   [facility] AM-PAC  \"6 Clicks\" V.2 Basic Mobility Inpatient Short Form   1. Turning from your back to your side while in a flat bed without using bedrails? 4 - None   2. Moving from lying on your back to sitting on the side of a flat bed without using bedrails? 4 - None   3. Moving to and from a bed to a chair (including a wheelchair)? 4 - None   4. Standing up from a chair using your arms (e.g., wheelchair, or bedside chair)? 4 - None   5. To walk in hospital room? 4 - None   6. Climbing 3-5 steps with a railing? 3 - A Little   Basic Mobility Raw Score (Score out of 24.Lower scores equate to lower levels of function) 23   Total Evaluation Time   Total Evaluation Time (Minutes) 7     "

## 2018-08-02 NOTE — PROGRESS NOTES
Pulmonary Medicine  Cystic Fibrosis - Lung Transplant Daily Progress Note   August 2, 2018       Patient: Kecia Blue  MRN: [id number]  Transplant Date: 3/1/2018 (POD# 154)  Admission date: 8/1/2018  Hospital Day #1          Assessment and Plan:     Mrs. Kecia Blue is a 58 y/o female with h/o dermatomyositis, seronegative RA, ILD and pulmonary hypertension who was admitted for emergent lung transplant workup on 2/21. [facility]/27 and subsequently received a bilateral lung transplant on 3/1/18. Post operative course complicated by right main bronchial stenosis, positive DSAs and CMV viremia. She is admitted from clinic for acute diarrhea, elevated Cr, CMV viremia and  FTT.     # CMV viremia: CMV PCR peaked on 7/10/18 at 17,933, down to 802 as of 7/13. Increased again to 10,000 on 7/20/18. Valcyte resumed at 900 mg daily (secondary to renal function) on 7/25/18 and down to 6200 on 8/1. Continues to complain of fatigue, FTT and now acute diarrhea. Concern for worsening CMV viremia +/- colitis.  - Resistance panel pending   - Continue valcyte 900 mg daily for now  - Consider ID consult depending on improvement and resistance panel      # Oral lesions: on left lower lip area. Appear consistent with HSV. HSV serum PCR negative.   - HSV PCR skin pending      # Acute diarrhea: X 4 days with liquid loose stools. No fever or chills, but consistently cold. No abdominal pain. Improving.   - C diff PCR, O/P, enteric/virus panel ordered  - Enteric precautions  - Consider GI consult +/- colonoscopy     # RADHA: Cr up to 1.38 from baseline around 0.9-1.0. BUN up to 27, suspect secondary to pre renal from both poor oral intake and recent diarrhea. Now resolved.   - stop IVF   - trend Cr  - regular diet       # S/p bilateral lung transplant: notes increased intermittently productive cough and wheezing which typically starts about a week after her bronch/dilation. No new  dyspnea, but does get short of breath with minimal activity. Hasn't been able to resume rehab since returning home, so feels there is a component of deconditioning. CXR reviewed by me today demonstrates stable transplant. PFTs improved from prior, FEV1 increased 8%, near her post transplant best Scheduled for bronch tomorrow.  - Continue immunosuppresion including mycophenolate 1500 mg BID, tacrolimus 5.5 mg in the am and 6 mg in the pm (goal 10-15) and prednisone taper of 7.5 in the am and 5 mg in the pm  - Current prophylaxis with Bactrim, Valcyte 900 mg daily (CMV D+/R+) and nystatin     # Right main bronchial stenosis s/p dilatation: s/p bronch on 7/10/18 with 2-3 mm stenosis filled with secretions and BI bronchomalacia s/p balloon dilation and washings. BAL grew out actinomyces and per Dr. Hale/ID, patient was started on Bactrim DS. Notes increased productive cough and wheezing, consistent with previous need for dilation. Dilated 8/2, tolerate well.   - Warfarin on hold, has been bridging with lovenox   - Continue pentoxyfilline and Bactrim 1 DS tablet daily for a total duration of one month     # Positive DSA: from 7/9 with DQB7 (up to 1576 from 1305)   - DSA pending      #Anemia: with hgb of 7.6 down from baseline 8-9s on admission. 6.5 on 8/2 and received 1 unit PRBCs. No s/s of bleeding. Iron panel WNL, ferritin and B12 elevated as acute reactant. Suspect secondary to medications.  - Monitor       # Provoked LUE DVT: on ultrasound on 3/7 demonstrating occlusive thrombus in the left subclavian vein, axillary vein, basilic vein in the upper arm, cephalic vein near junction with the subclavian vein and cephalic vein from mid arm to the wrist. Repeat US on 4/16 with decreased subclavian thrombus and resolved axillary thrombus, but new (vs previously obscured) right IJ clot. On warfarin, goal is INR of 2-3.   - holding lovenox and warfarin post-dilation  - repeat BUE US tomorrow   - Will discuss duration of  AC tomorrow      # High risk donor: will need 3 and 12 months labs. Do not see 3 month labs completed.  - Consider completing IP     # HTN: BP has been stable.   - Continue metoprolol 25 mg BID     # Left knee pain: with effusion following last day of rehab on the leg press. S/p steroid injection with slight improvement in pain, but still has a lot of joint instability, cannot walk well. Large barrier to her ongoing rehab.   - PT/OT to assess  - Consider f/u with Orthopedics OP      Chronic issus:  1. Dermatomyositis      CODE: full  Diet/IVF: regular  DVT ppx: ambulation; will discuss AC tomorrow   Disposition/Admission Status: > 2 midnights    Kd Lowery  Pulmonary  Pager [phone]  Please see Epic sticky note or Amcom for team contact information  After 6pm weekdays, or all day on weekends: pager [phone]        Subjective & Interval History:     Last 24 hours of care team notes reviewed.    Seen after bronchoscopy today. Doing well post procedure. Denies pain, bloody stools, other blood loss. No abdominal pain. Still feeling run down. Sores near mouth are healing.            Review of Systems:     10 system ROS negative other than noted above           Medications:     Active Medications:    calcium-vitamin D  1 tablet Oral Daily     magnesium oxide  400 mg Oral Daily     metoprolol tartrate  25 mg Oral BID     multivitamin, therapeutic with minerals  1 tablet Oral Daily     mycophenolate  1,500 mg Oral BID     nystatin  10 mL Oral 4x Daily     pantoprazole  40 mg Oral QAM     pentoxifylline  400 mg Oral BID     predniSONE  5 mg Oral QPM     predniSONE  7.5 mg Oral QAM     sulfamethoxazole-trimethoprim  1 tablet Oral Daily     tacrolimus  5.5 mg Oral QAM     tacrolimus  6 mg Oral QPM     [START ON 8/3/2018] valGANciclovir  900 mg Oral Daily     Active PRN Medications:  acetaminophen (TYLENOL) tablet 1,000 mg       Physical Exam:     Constitutional: comfortable, lying in bed, in no apparent distress.  "  HEENT: Eyes with pink conjunctivae, anicteric.  Oral mucosa dry. Sores left inferior mouth. Neck supple without lymphadenopathy.   PULM: Good air flow bilaterally.  No crackles, no rhonchi, no wheezes. Non-labored breathing on room air (on ETCO2 monitoring).  CV: Normal S1 and S2. RRR.  No murmur, gallop, or rub.  No peripheral edema.   ABD: NABS, soft, nontender, nondistended.  No guarding.   MSK: Moves all extremities.  No apparent muscle wasting.   NEURO: Alert and oriented x 4, conversant.   SKIN: Warm, dry.  No rash on limited exam.   PSYCH: Mood stable.? ?     Lines, Drains, and Devices:  Peripheral IV 08/01/18 Right;Anterior Lower forearm (Active)   Site Assessment [facility] 8/2/2018  2:55 PM   Line Status Saline locked 8/2/2018  2:55 PM   Phlebitis Scale 0-->no symptoms 8/2/2018  2:55 PM   Infiltration Scale 0 8/2/2018  2:55 PM   Infiltration Site Treatment Method  None 8/2/2018  2:55 PM   Extravasation? No 8/2/2018  2:55 PM   Number of days:1       Peripheral IV 08/02/18 Left Lower forearm (Active)   Site Assessment [facility] 8/2/2018  2:55 PM   Line Status Infusing 8/2/2018  2:55 PM   Phlebitis Scale 0-->no symptoms 8/2/2018  2:55 PM   Infiltration Scale 0 8/2/2018  2:55 PM   Infiltration Site Treatment Method  None 8/2/2018  2:55 PM   Extravasation? No 8/2/2018  2:55 PM   Number of days:0          Data:     All vital signs, laboratory and imaging data for the past 24 hours reviewed.      Vital signs:  Temp: 97.5  F (36.4  C) Temp src: Oral BP: 128/78   Heart Rate: 88 Resp: 20 SpO2: 99 % O2 Device: None (Room air) Oxygen Delivery: 6 LPM Height: 162.6 cm (5' 4\") Weight: 54.9 kg (121 lb 1.6 oz)    Pain: # Pain Assessment:  Current Pain Score 8/2/2018   Patient currently in pain? denies   Pain location -   Pain descriptors -   CPOT pain score -   Kecia rivas pain level was assessed and she currently denies pain.      Weight trend:   Vitals:    08/01/18 1711   Weight: 54.9 kg (121 lb 1.6 oz)        I/O:   Intake/Output " Summary (Last 24 hours) at 08/02/18 1653  Last data filed at 08/02/18 1423   Gross per 24 hour   Intake          2150.41 ml   Output             2550 ml   Net          -399.59 ml       Labs    CMP:   Recent Labs  Lab 08/02/18  1151 08/02/18  1100 08/01/18  0921   NA  --  137 136   POTASSIUM  --  4.4 4.5   CHLORIDE  --  108 105   CO2  --  21 21   ANIONGAP  --  8 10   GLC  --  110* 123*   BUN  --  18 27   CR  --  0.96 1.38*   GFRESTIMATED  --  60* 40*   GFRESTBLACK  --  73 48*   CHELSEY  --  8.1* 9.1   MAG  --   --  1.7   PROTTOTAL 6.2*  --   --    ALBUMIN 2.6*  --   --    BILITOTAL 0.2  --   --    ALKPHOS 87  --   --    AST 15  --   --    ALT 21  --   --      CBC:   Recent Labs  Lab 08/02/18  1151 08/02/18  1100 08/01/18  0921   WBC  --  6.1 6.9   RBC  --  2.21* 2.54*   HGB 6.5* 6.5* 7.6*   HCT  --  21.6* 25.6*   MCV  --  98 101*   MCH  --  29.4 29.9   MCHC  --  30.1* 29.7*   RDW  --  14.7 14.5   PLT  --  301 407     INR:   Recent Labs  Lab 08/01/18  0921   INR 1.28*     Glucose:   Recent Labs  Lab 08/02/18  1100 08/01/18  0921   * 123*     Blood Gas: No lab results found in last 7 days.  Culture Data No results for input(s): CULT in the last 168 hours.  Virology Data: Lab Results   Component Value Date    FLUAH1 Negative 07/10/2018    FLUAH1 Negative 07/10/2018    FLUAH3 Negative 07/10/2018    FLUAH3 Negative 07/10/2018    [id number] Negative 07/10/2018    NY3065 Negative 07/10/2018    IFLUB Negative 07/10/2018    IFLUB Negative 07/10/2018    RSVA Negative 07/10/2018    RSVA Negative 07/10/2018    RSVB Negative 07/10/2018    RSVB Negative 07/10/2018    PIV1 Negative 07/10/2018    PIV1 Negative 07/10/2018    PIV2 Negative 07/10/2018    PIV2 Negative 07/10/2018    PIV3 Negative 07/10/2018    PIV3 Negative 07/10/2018    HMPV Negative 07/10/2018    HMPV Negative 07/10/2018    HRVS Negative 07/10/2018    HRVS Negative 07/10/2018    ADVBE Negative 07/10/2018    ADVBE Negative 07/10/2018    ADVC Negative 07/10/2018    ADVC  Negative 07/10/2018    ADVC Negative 04/10/2018     Historical CMV results (last 3 of prior testing):  Lab Results   Component Value Date    CMVQNT 6171 (A) 08/01/2018    CMVQNT 59917 (A) 07/10/2018    CMVQNT 9324 (A) 07/10/2018     Lab Results   Component Value Date    CMVLOG 3.8 (H) 08/01/2018    CMVLOG 4.3 (H) 07/10/2018    CMVLOG 4.0 (H) 07/10/2018     Urine Studies  Recent Labs   Lab Test  03/04/18   1425   URINEPH  5.5   NITRITE  Negative   LEUKEST  Negative   WBCU  5

## 2018-08-02 NOTE — PLAN OF CARE
Problem: Patient Care Overview  Goal: Plan of Care/Patient Progress Review  Discharge Planner PT   Patient plan for discharge: home with OP PT - aquatic therapy  Current status: patient is indep with bed mobility, transfers. SBA for ambulation, limited by shortness of breath. Patient would benefit from OP PT to address L knee pain, generalized strength, and activity tolerance. PT will follow 3x/week while inpatient to progress aerobic exercise.  Barriers to return to prior living situation: medical stability  Recommendations for discharge: home with OP PT  Rationale for recommendations: see above       Entered by: Yudelka Gonzalez 08/02/2018 1:36 PM

## 2018-08-02 NOTE — OR NURSING
Dr. Freeman notified of pt's HGB of 6.5.  He is requesting that pt be redrawn.  He also states he will speak with Dr. Lin about low HGB level.

## 2018-08-02 NOTE — PROGRESS NOTES
D: Pt admitted 8/1/18 with CMV viremia, diarrhea, RADHA, dehydration, and FTT. S/p bilateral lung txp 3/1/18 c/b bronchial stenosis.    I: Monitored vitals and assessed pt status.   Changed: Bronch this am.  Running: R PIV SL  PRN: n/a    A: A0x4. VSS on RA. Afebrile. SR in 80s-90s. Urinating adequately. Denies pain. Pt has capnography on - all numbers WNL. Up independently in room and tolerating well. Fair appetite. Hgb was 6.5 in pre op - required 1 unit of PRBCs. Pt resting comfortably in bed with  in room.     P: Continue to monitor Pt status and report changes to treatment team.

## 2018-08-02 NOTE — ANESTHESIA PREPROCEDURE EVALUATION
Anesthesia Evaluation     . Pt has had prior anesthetic.     History of anesthetic complications   - PONV        ROS/MED HX    ENT/Pulmonary: Comment: ILD s/p B lung txp      Neurologic:       Cardiovascular: Comment: S/p several ECMO cannulations and decannulations    TTE 3/2018:  LVEF 5-70%  RVnl    Hx of acute LV failure, but now with preserved EF    (+) hypertension----. : . . . :. . pulmonary hypertension (2/2 ILD (now s/p B lung txp)),       METS/Exercise Tolerance:  >4 METS   Hematologic:     (+) History of blood clots (IJ blood clot on coumadin, transitioned to lovenox for procedure today) -      Musculoskeletal:         GI/Hepatic:         Renal/Genitourinary:         Endo:         Psychiatric:         Infectious Disease:         Malignancy:         Other: Comment: Rheumatoid arthritis; Arron's                    Physical Exam  Normal systems: dental    Airway   Mallampati: II  TM distance: >3 FB  Neck ROM: full    Dental     Cardiovascular   Rhythm and rate: regular and normal  (-) no weak pulses and no murmur    Pulmonary    breath sounds clear to auscultation(-) no rhonchi                        Anesthesia Plan      History & Physical Review  History and physical reviewed and following examination; no interval change.    ASA Status:  3 .    NPO Status:  > 8 hours    Plan for General and ETT with Intravenous induction. Maintenance will be TIVA.      Additional equipment: 2nd IV GETA. PIVx2. Standard ASA monitors. IV opioids, adjuncts. PACU postop. Hb 6.5 on repeat. Will give give 1u prbcs    Risks and benefits of anesthetic discussed with patient including sore throat, voice hoarseness, injury to vocal cords, throat, mouth, teeth, tongue, and lips from intubation; nausea/vomiting; cardiac arrest, respiratory complications, MI, stroke, blood clots, death.    Presented opportunity to answer patient and family questions. Questions addressed.        Postoperative Care      Consents  Anesthetic plan, risks,  benefits and alternatives discussed with:  Spouse and Patient.  Use of blood products discussed: Yes.   Use of blood products discussed with Patient.  Consented to blood products.  .

## 2018-08-03 ENCOUNTER — APPOINTMENT (OUTPATIENT)
Dept: ULTRASOUND IMAGING | Facility: CLINIC | Age: 58
DRG: 167 | End: 2018-08-03
Attending: INTERNAL MEDICINE
Payer: COMMERCIAL

## 2018-08-03 ENCOUNTER — APPOINTMENT (OUTPATIENT)
Dept: PHYSICAL THERAPY | Facility: CLINIC | Age: 58
DRG: 167 | End: 2018-08-03
Attending: INTERNAL MEDICINE
Payer: COMMERCIAL

## 2018-08-03 DIAGNOSIS — J98.09 BRONCHIAL STENOSIS: Primary | ICD-10-CM

## 2018-08-03 LAB
ALBUMIN SERPL-MCNC: 2.5 G/DL (ref 3.4–5)
ALP SERPL-CCNC: 72 U/L (ref 40–150)
ALT SERPL W P-5'-P-CCNC: 18 U/L (ref 0–50)
ANION GAP SERPL CALCULATED.3IONS-SCNC: 8 MMOL/L (ref 3–14)
APTT PPP: 26 SEC (ref 22–37)
AST SERPL W P-5'-P-CCNC: 8 U/L (ref 0–45)
BASOPHILS # BLD AUTO: 0 10E9/L (ref 0–0.2)
BASOPHILS NFR BLD AUTO: 0 %
BILIRUB DIRECT SERPL-MCNC: <0.1 MG/DL (ref 0–0.2)
BILIRUB SERPL-MCNC: 0.3 MG/DL (ref 0.2–1.3)
BUN SERPL-MCNC: 23 MG/DL (ref 7–30)
C COLI+JEJUNI+LARI FUSA STL QL NAA+PROBE: NOT DETECTED
C DIFF TOX B STL QL: POSITIVE
CALCIUM SERPL-MCNC: 8.2 MG/DL (ref 8.5–10.1)
CHLORIDE SERPL-SCNC: 108 MMOL/L (ref 94–109)
CO2 SERPL-SCNC: 20 MMOL/L (ref 20–32)
COPATH REPORT: NORMAL
COPATH REPORT: NORMAL
CREAT SERPL-MCNC: 1.23 MG/DL (ref 0.52–1.04)
DIFFERENTIAL METHOD BLD: ABNORMAL
EC STX1 GENE STL QL NAA+PROBE: NOT DETECTED
EC STX2 GENE STL QL NAA+PROBE: NOT DETECTED
ENTERIC PATHOGEN COMMENT: NORMAL
EOSINOPHIL # BLD AUTO: 0 10E9/L (ref 0–0.7)
EOSINOPHIL NFR BLD AUTO: 0 %
ERYTHROCYTE [DISTWIDTH] IN BLOOD BY AUTOMATED COUNT: 15 % (ref 10–15)
FLUAV H1 2009 PAND RNA SPEC QL NAA+PROBE: NEGATIVE
FLUAV H1 RNA SPEC QL NAA+PROBE: NEGATIVE
FLUAV H3 RNA SPEC QL NAA+PROBE: NEGATIVE
FLUAV RNA SPEC QL NAA+PROBE: NEGATIVE
FLUBV RNA SPEC QL NAA+PROBE: NEGATIVE
GFR SERPL CREATININE-BSD FRML MDRD: 45 ML/MIN/1.7M2
GLUCOSE SERPL-MCNC: 108 MG/DL (ref 70–99)
HADV DNA SPEC QL NAA+PROBE: NEGATIVE
HADV DNA SPEC QL NAA+PROBE: NEGATIVE
HCT VFR BLD AUTO: 23.6 % (ref 35–47)
HGB BLD-MCNC: 7.3 G/DL (ref 11.7–15.7)
HGB BLD-MCNC: 7.5 G/DL (ref 11.7–15.7)
HMPV RNA SPEC QL NAA+PROBE: NEGATIVE
HPIV1 RNA SPEC QL NAA+PROBE: NEGATIVE
HPIV2 RNA SPEC QL NAA+PROBE: NEGATIVE
HPIV3 RNA SPEC QL NAA+PROBE: NEGATIVE
INR PPP: 1.09 (ref 0.86–1.14)
LYMPHOCYTES # BLD AUTO: 0.1 10E9/L (ref 0.8–5.3)
LYMPHOCYTES NFR BLD AUTO: 0.9 %
MAGNESIUM SERPL-MCNC: 1.7 MG/DL (ref 1.6–2.3)
MCH RBC QN AUTO: 29.9 PG (ref 26.5–33)
MCHC RBC AUTO-ENTMCNC: 30.9 G/DL (ref 31.5–36.5)
MCV RBC AUTO: 97 FL (ref 78–100)
METAMYELOCYTES # BLD: 0.1 10E9/L
METAMYELOCYTES NFR BLD MANUAL: 0.9 %
MICROBIOLOGIST REVIEW: NORMAL
MONOCYTES # BLD AUTO: 0 10E9/L (ref 0–1.3)
MONOCYTES NFR BLD AUTO: 0 %
NEUTROPHILS # BLD AUTO: 5.8 10E9/L (ref 1.6–8.3)
NEUTROPHILS NFR BLD AUTO: 98.2 %
NOROV GI+II ORF1-ORF2 JNC STL QL NAA+PR: NOT DETECTED
O+P STL MICRO: NORMAL
O+P STL MICRO: NORMAL
PHOSPHATE SERPL-MCNC: 2.6 MG/DL (ref 2.5–4.5)
PLATELET # BLD AUTO: 300 10E9/L (ref 150–450)
PLATELET # BLD EST: ABNORMAL 10*3/UL
POIKILOCYTOSIS BLD QL SMEAR: SLIGHT
POTASSIUM SERPL-SCNC: 4.9 MMOL/L (ref 3.4–5.3)
PROT SERPL-MCNC: 5.7 G/DL (ref 6.8–8.8)
RBC # BLD AUTO: 2.44 10E12/L (ref 3.8–5.2)
RETICS # AUTO: 34.2 10E9/L (ref 25–95)
RETICS/RBC NFR AUTO: 1.4 % (ref 0.5–2)
RHINOVIRUS RNA SPEC QL NAA+PROBE: NEGATIVE
RSV RNA SPEC QL NAA+PROBE: NEGATIVE
RSV RNA SPEC QL NAA+PROBE: NEGATIVE
RVA NSP5 STL QL NAA+PROBE: NOT DETECTED
SALMONELLA SP RPOD STL QL NAA+PROBE: NOT DETECTED
SHIGELLA SP+EIEC IPAH STL QL NAA+PROBE: NOT DETECTED
SODIUM SERPL-SCNC: 136 MMOL/L (ref 133–144)
SPECIMEN SOURCE: ABNORMAL
SPECIMEN SOURCE: NORMAL
SPECIMEN SOURCE: NORMAL
V CHOL+PARA RFBL+TRKH+TNAA STL QL NAA+PR: NOT DETECTED
WBC # BLD AUTO: 5.9 10E9/L (ref 4–11)
Y ENTERO RECN STL QL NAA+PROBE: NOT DETECTED

## 2018-08-03 PROCEDURE — 85027 COMPLETE CBC AUTOMATED: CPT | Performed by: INTERNAL MEDICINE

## 2018-08-03 PROCEDURE — 80076 HEPATIC FUNCTION PANEL: CPT | Performed by: INTERNAL MEDICINE

## 2018-08-03 PROCEDURE — 97110 THERAPEUTIC EXERCISES: CPT | Mod: GP

## 2018-08-03 PROCEDURE — 25000131 ZZH RX MED GY IP 250 OP 636 PS 637: Performed by: INTERNAL MEDICINE

## 2018-08-03 PROCEDURE — 25000132 ZZH RX MED GY IP 250 OP 250 PS 637: Performed by: PHYSICIAN ASSISTANT

## 2018-08-03 PROCEDURE — 25000128 H RX IP 250 OP 636: Performed by: INTERNAL MEDICINE

## 2018-08-03 PROCEDURE — 85004 AUTOMATED DIFF WBC COUNT: CPT | Performed by: INTERNAL MEDICINE

## 2018-08-03 PROCEDURE — 40000611 ZZHCL STATISTIC MORPHOLOGY W/INTERP HEMEPATH TC 85060: Performed by: INTERNAL MEDICINE

## 2018-08-03 PROCEDURE — 25000132 ZZH RX MED GY IP 250 OP 250 PS 637: Performed by: INTERNAL MEDICINE

## 2018-08-03 PROCEDURE — 25000131 ZZH RX MED GY IP 250 OP 636 PS 637: Performed by: PHYSICIAN ASSISTANT

## 2018-08-03 PROCEDURE — 83735 ASSAY OF MAGNESIUM: CPT | Performed by: INTERNAL MEDICINE

## 2018-08-03 PROCEDURE — 36415 COLL VENOUS BLD VENIPUNCTURE: CPT | Performed by: INTERNAL MEDICINE

## 2018-08-03 PROCEDURE — 85610 PROTHROMBIN TIME: CPT | Performed by: INTERNAL MEDICINE

## 2018-08-03 PROCEDURE — 25000132 ZZH RX MED GY IP 250 OP 250 PS 637

## 2018-08-03 PROCEDURE — 97116 GAIT TRAINING THERAPY: CPT | Mod: GP

## 2018-08-03 PROCEDURE — 85045 AUTOMATED RETICULOCYTE COUNT: CPT | Performed by: INTERNAL MEDICINE

## 2018-08-03 PROCEDURE — 97530 THERAPEUTIC ACTIVITIES: CPT | Mod: GP

## 2018-08-03 PROCEDURE — 93970 EXTREMITY STUDY: CPT

## 2018-08-03 PROCEDURE — 25000125 ZZHC RX 250: Performed by: INTERNAL MEDICINE

## 2018-08-03 PROCEDURE — 87910 NFCT AGT GNTYP ALYS CMV: CPT | Performed by: INTERNAL MEDICINE

## 2018-08-03 PROCEDURE — 40000193 ZZH STATISTIC PT WARD VISIT

## 2018-08-03 PROCEDURE — 21400006 ZZH R&B CCU INTERMEDIATE UMMC

## 2018-08-03 PROCEDURE — 84100 ASSAY OF PHOSPHORUS: CPT | Performed by: INTERNAL MEDICINE

## 2018-08-03 PROCEDURE — 80048 BASIC METABOLIC PNL TOTAL CA: CPT | Performed by: INTERNAL MEDICINE

## 2018-08-03 PROCEDURE — 25000125 ZZHC RX 250: Performed by: PHYSICIAN ASSISTANT

## 2018-08-03 PROCEDURE — 85730 THROMBOPLASTIN TIME PARTIAL: CPT | Performed by: INTERNAL MEDICINE

## 2018-08-03 PROCEDURE — 85018 HEMOGLOBIN: CPT | Performed by: INTERNAL MEDICINE

## 2018-08-03 RX ORDER — VANCOMYCIN HYDROCHLORIDE 50 MG/ML
125 KIT ORAL 4 TIMES DAILY
Status: DISCONTINUED | OUTPATIENT
Start: 2018-08-03 | End: 2018-08-04 | Stop reason: HOSPADM

## 2018-08-03 RX ORDER — ENOXAPARIN SODIUM 100 MG/ML
60 INJECTION SUBCUTANEOUS 2 TIMES DAILY
Status: DISCONTINUED | OUTPATIENT
Start: 2018-08-03 | End: 2018-08-03

## 2018-08-03 RX ORDER — SODIUM CHLORIDE, SODIUM LACTATE, POTASSIUM CHLORIDE, CALCIUM CHLORIDE 600; 310; 30; 20 MG/100ML; MG/100ML; MG/100ML; MG/100ML
INJECTION, SOLUTION INTRAVENOUS CONTINUOUS
Status: DISPENSED | OUTPATIENT
Start: 2018-08-03 | End: 2018-08-03

## 2018-08-03 RX ORDER — WARFARIN SODIUM 4 MG/1
4 TABLET ORAL
Status: COMPLETED | OUTPATIENT
Start: 2018-08-03 | End: 2018-08-03

## 2018-08-03 RX ORDER — METRONIDAZOLE 250 MG/1
500 TABLET ORAL EVERY 8 HOURS SCHEDULED
Status: DISCONTINUED | OUTPATIENT
Start: 2018-08-03 | End: 2018-08-03

## 2018-08-03 RX ORDER — VANCOMYCIN HYDROCHLORIDE 50 MG/ML
125 KIT ORAL 4 TIMES DAILY
Qty: 90 ML | Refills: 0 | Status: SHIPPED | OUTPATIENT
Start: 2018-08-04 | End: 2018-08-04

## 2018-08-03 RX ADMIN — PENTOXIFYLLINE 400 MG: 400 TABLET, FILM COATED, EXTENDED RELEASE ORAL at 10:04

## 2018-08-03 RX ADMIN — Medication 1 TABLET: at 10:04

## 2018-08-03 RX ADMIN — TACROLIMUS 6 MG: 5 CAPSULE ORAL at 20:30

## 2018-08-03 RX ADMIN — MULTIPLE VITAMINS W/ MINERALS TAB 1 TABLET: TAB at 10:04

## 2018-08-03 RX ADMIN — WARFARIN SODIUM 4 MG: 4 TABLET ORAL at 18:15

## 2018-08-03 RX ADMIN — ENOXAPARIN SODIUM 60 MG: 60 INJECTION SUBCUTANEOUS at 18:15

## 2018-08-03 RX ADMIN — MYCOPHENOLATE MOFETIL 1500 MG: 250 CAPSULE ORAL at 20:30

## 2018-08-03 RX ADMIN — SODIUM CHLORIDE, POTASSIUM CHLORIDE, SODIUM LACTATE AND CALCIUM CHLORIDE: 600; 310; 30; 20 INJECTION, SOLUTION INTRAVENOUS at 10:16

## 2018-08-03 RX ADMIN — METOPROLOL TARTRATE 25 MG: 25 TABLET ORAL at 20:30

## 2018-08-03 RX ADMIN — VALGANCICLOVIR 900 MG: 450 TABLET, FILM COATED ORAL at 10:03

## 2018-08-03 RX ADMIN — PENTOXIFYLLINE 400 MG: 400 TABLET, FILM COATED, EXTENDED RELEASE ORAL at 20:30

## 2018-08-03 RX ADMIN — SODIUM CHLORIDE, POTASSIUM CHLORIDE, SODIUM LACTATE AND CALCIUM CHLORIDE: 600; 310; 30; 20 INJECTION, SOLUTION INTRAVENOUS at 16:53

## 2018-08-03 RX ADMIN — VANCOMYCIN HYDROCHLORIDE 125 MG: KIT at 15:50

## 2018-08-03 RX ADMIN — PREDNISONE 5 MG: 5 TABLET ORAL at 20:31

## 2018-08-03 RX ADMIN — PREDNISONE 7.5 MG: 5 TABLET ORAL at 10:03

## 2018-08-03 RX ADMIN — NYSTATIN 1000000 UNITS: 100000 SUSPENSION ORAL at 20:31

## 2018-08-03 RX ADMIN — SULFAMETHOXAZOLE AND TRIMETHOPRIM 1 TABLET: 800; 160 TABLET ORAL at 10:03

## 2018-08-03 RX ADMIN — METRONIDAZOLE 500 MG: 250 TABLET ORAL at 04:27

## 2018-08-03 RX ADMIN — MYCOPHENOLATE MOFETIL 1500 MG: 250 CAPSULE ORAL at 10:02

## 2018-08-03 RX ADMIN — VANCOMYCIN HYDROCHLORIDE 125 MG: KIT at 12:20

## 2018-08-03 RX ADMIN — VANCOMYCIN HYDROCHLORIDE 125 MG: KIT at 20:30

## 2018-08-03 RX ADMIN — TACROLIMUS 5.5 MG: 5 CAPSULE ORAL at 10:02

## 2018-08-03 RX ADMIN — NYSTATIN 1000000 UNITS: 100000 SUSPENSION ORAL at 12:20

## 2018-08-03 RX ADMIN — PANTOPRAZOLE SODIUM 40 MG: 40 TABLET, DELAYED RELEASE ORAL at 10:04

## 2018-08-03 RX ADMIN — MAGNESIUM OXIDE TAB 400 MG (241.3 MG ELEMENTAL MG) 400 MG: 400 (241.3 MG) TAB at 10:05

## 2018-08-03 RX ADMIN — NYSTATIN 1000000 UNITS: 100000 SUSPENSION ORAL at 10:03

## 2018-08-03 RX ADMIN — METOPROLOL TARTRATE 25 MG: 25 TABLET ORAL at 10:04

## 2018-08-03 RX ADMIN — NYSTATIN 1000000 UNITS: 100000 SUSPENSION ORAL at 15:50

## 2018-08-03 ASSESSMENT — ACTIVITIES OF DAILY LIVING (ADL)
ADLS_ACUITY_SCORE: 10

## 2018-08-03 NOTE — PLAN OF CARE
Problem: Patient Care Overview  Goal: Plan of Care/Patient Progress Review  RN:  1.Pt will maintain normal ventilation/oxygenation.  2.No s/s bleed.     Outcome: No Change  D:acute diarrhea, elevated Cr, CMV viremia and  FTT, now diagnosed with C diff colitis  I/A:  C diff med changed from Flagyl to PO Vanco.  Denies diarrhea since over night.  Up independently, walking in hallways with .  LR infusing at 150ml/hr x10 hrs. US of UE completed, orders to restart anticoagulation this evening.  Hgb down slightly again to 7.3 with no overt signs of bleeding.  P: Restart anticoagulation, continue to monitor labs and GI.  Notify team with concerns

## 2018-08-03 NOTE — PLAN OF CARE
Problem: Patient Care Overview  Goal: Plan of Care/Patient Progress Review  RN:  1.Pt will maintain normal ventilation/oxygenation.  2.No s/s bleed.     PT / 6C -    Discharge Planner PT   Patient plan for discharge: home with OP PT   Current status: Performing bed mobility and transfers without additional assistance.  Engaged in L LE ROM/exercise program.  Amb with B UE on IV pole ~ 500' with SpO2 >97% throughout.  Barriers to return to prior living situation: no PT barriers  Recommendations for discharge: home with OP PT  Rationale for recommendations: Skilled OP PT to continue progressing strength and ROM in LLE to improve pain, transfers and gait.        Entered by: Rona Ling 08/03/2018 4:42 PM

## 2018-08-03 NOTE — ANESTHESIA POSTPROCEDURE EVALUATION
Patient: Kecia Blue    Procedure(s):  Flexible Bronchoscopy, Bronchial Washings, Balloon Dilation - Wound Class: II-Clean Contaminated    Diagnosis:History Lung Transplant   Diagnosis Additional Information: No value filed.    Anesthesia Type:  General, ETT    Note:  Anesthesia Post Evaluation    Patient location during evaluation: Floor  Patient participation: Able to fully participate in evaluation  Level of consciousness: awake and alert  Pain management: adequate  Airway patency: patent  Cardiovascular status: acceptable  Respiratory status: acceptable  Hydration status: acceptable  PONV: none     Anesthetic complications: None    Comments: Late note. Evaluated 8/2/2018        Last vitals:  Vitals:    08/02/18 2225 08/03/18 0425 08/03/18 0741   BP: 121/80 123/84 141/80   Resp: 20 16 16   Temp: 36.9  C (98.5  F) 36.7  C (98  F) 36.8  C (98.2  F)   SpO2: 97% 100% 98%         Electronically Signed By: Farhad Freeman MD  August 3, 2018  9:48 AM

## 2018-08-03 NOTE — PROGRESS NOTES
Pulmonary Medicine  Cystic Fibrosis - Lung Transplant Daily Progress Note   August 2, 2018       Patient: Kecia Blue  MRN: [id number]  Transplant Date: 3/1/2018 (POD# 155)  Admission date: 8/1/2018  Hospital Day #2           Assessment and Plan:     Mrs. Kecia Blue is a 56 y/o female with h/o dermatomyositis, seronegative RA, ILD and pulmonary hypertension who was admitted for emergent lung transplant workup on 2/21. [facility] for [facility] failure on 2/27 and subsequently received a bilateral lung transplant on 3/1/18. Post operative course complicated by right main bronchial stenosis, positive DSAs and CMV viremia. She was admitted from clinic for acute diarrhea, elevated Cr, CMV viremia and  FTT, now diagnosed with C diff colitis.      # C diff colitis: presented with several days of watery diarrhea, RADHA. Had hemoglobin drop while hospitalized, no overtly bloody stool however no other obvious source of bleeding and no hemolysis.   - Vancomycin 125 mg qid for 10 days     # CMV viremia: CMV PCR peaked on 7/10/18 at 17,933, down to 802 as of 7/13. Increased again to 10,000 on 7/20/18. Valcyte resumed at 900 mg daily (secondary to renal function) on 7/25/18 and down to 6200 on 8/1. Continues to complain of fatigue and FTT.   - Resistance panel pending   - Continue valcyte 900 mg daily for now     # Oral lesions: on left lower lip area. Appear consistent with HSV. HSV serum PCR negative.   - HSV PCR skin pending     # RADHA: Cr up to 1.38 from baseline around 0.9-1.0 on admission, ~1.2 today. Suspect pre renal from both poor oral intake and recent diarrhea.   -  mL/hr x10 hrs  - trend Cr  - regular diet       # S/p bilateral lung transplant: notes increased intermittently productive cough and wheezing which typically starts about a week after her bronch/dilation. No new dyspnea, but does get short of breath with minimal activity. Hasn't been able to resume rehab since returning  home, so feels there is a component of deconditioning. Stable CXR. PFTs improved from prior, FEV1 increased 8%, near her post transplant best.   - Continue immunosuppresion including mycophenolate 1500 mg BID, tacrolimus 5.5 mg in the am and 6 mg in the pm (goal 10-15) and prednisone taper of 7.5 in the am and 5 mg in the pm  - Current prophylaxis with Bactrim, Valcyte 900 mg daily (CMV D+/R+) and nystatin     # Right main bronchial stenosis s/p dilatation: s/p bronch on 7/10/18 with 2-3 mm stenosis filled with secretions and BI bronchomalacia s/p balloon dilation and washings. BAL grew out actinomyces and per Dr. Hale/ID, patient was started on Bactrim DS. Notes increased productive cough and wheezing, consistent with previous need for dilation. Dilated 8/2, tolerate well.   - Warfarin on hold, has been bridging with lovenox   - Continue pentoxyfilline and Bactrim 1 DS tablet daily for a total duration of one month  - bronchoscopy 8/2 with repeat dilation, cultures pending      # Positive DSA: from 7/9 with DQB7 (up to 1576 from 1305)   - DSA pending      #Anemia: with hgb of 7.6 down from baseline 8-9s on admission. 6.5 on 8/2 and received 1 unit PRBCs. No s/s of bleeding. Iron panel WNL, ferritin and B12 elevated as acute reactant. May be 2/2 C diff colitis though no obvious bleeding.   - Monitor, tx C diff as above        # Provoked LUE DVT: on ultrasound on 3/7 demonstrating occlusive thrombus in the left subclavian vein, axillary vein, basilic vein in the upper arm, cephalic vein near junction with the subclavian vein and cephalic vein from mid arm to the wrist. Repeat US on 4/16 with decreased subclavian thrombus and resolved axillary thrombus, but new (vs previously obscured) right IJ clot. On warfarin, goal is INR of 2-3.   - holding lovenox and warfarin post-dilation  - Will discuss duration of AC with Dr. Hale  - Formal read BUE US pending       # High risk donor: will need 3 and 12 months labs.  Do not see 3 month labs completed.  - Consider completing IP     # HTN: BP has been stable.   - Continue metoprolol 25 mg BID     # Left knee pain: with effusion following last day of rehab on the leg press. S/p steroid injection with slight improvement in pain, but still has a lot of joint instability, cannot walk well. Large barrier to her ongoing rehab.   - PT/OT      Chronic issus:  1. Dermatomyositis      CODE: full  Diet/IVF: regular  DVT ppx: ambulation.   Disposition/Admission Status: > 2 midnights    Kd Lowery  Pulmonary  Pager [phone]  Please see Epic sticky note or [employer] for team contact information  After 6pm weekdays, or all day on weekends: pager [phone]        Subjective & Interval History:     Last 24 hours of care team notes reviewed.    Feeling a bit better today. Continues to have no abdominal pain, chest pain, difficulty breathing. Continues to have loose stools, though seem less watery. Started on Flagyl overnight, switched to PO vancomycin this morning. Looking forward to going home.            Review of Systems:     10 system ROS negative other than noted above           Medications:     Active Medications:    calcium-vitamin D  1 tablet Oral Daily     magnesium oxide  400 mg Oral Daily     metoprolol tartrate  25 mg Oral BID     multivitamin, therapeutic with minerals  1 tablet Oral Daily     mycophenolate  1,500 mg Oral BID     nystatin  10 mL Oral 4x Daily     pantoprazole  40 mg Oral QAM     pentoxifylline  400 mg Oral BID     predniSONE  5 mg Oral QPM     predniSONE  7.5 mg Oral QAM     sulfamethoxazole-trimethoprim  1 tablet Oral Daily     tacrolimus  5.5 mg Oral QAM     tacrolimus  6 mg Oral QPM     valGANciclovir  900 mg Oral Daily     vancomycin  125 mg Oral 4x Daily     Active PRN Medications:  acetaminophen (TYLENOL) tablet 1,000 mg, naloxone       Physical Exam:     Constitutional: comfortable, lying in bed, in no apparent distress.   HEENT: Eyes with pink conjunctivae,  "anicteric.  Oral mucosa dry. Sores left inferior mouth. Neck supple without lymphadenopathy.   PULM: Good air flow bilaterally.  No crackles, no rhonchi, no wheezes.   CV: Normal S1 and S2. RRR.  No murmur, gallop, or rub.  No peripheral edema.   ABD: NABS, soft, nontender, nondistended.  No guarding.   MSK: Moves all extremities.  No apparent muscle wasting.   NEURO: Alert and oriented x 4, conversant.   SKIN: Warm, dry.  No rash on limited exam.   PSYCH: Mood stable.? ?     Lines, Drains, and Devices:  Peripheral IV 08/01/18 Right;Anterior Lower forearm (Active)   Site Assessment [facility] 8/2/2018  2:55 PM   Line Status Saline locked 8/2/2018  2:55 PM   Phlebitis Scale 0-->no symptoms 8/2/2018  2:55 PM   Infiltration Scale 0 8/2/2018  2:55 PM   Infiltration Site Treatment Method  None 8/2/2018  2:55 PM   Extravasation? No 8/2/2018  2:55 PM   Number of days:1       Peripheral IV 08/02/18 Left Lower forearm (Active)   Site Assessment [facility] 8/2/2018  2:55 PM   Line Status Infusing 8/2/2018  2:55 PM   Phlebitis Scale 0-->no symptoms 8/2/2018  2:55 PM   Infiltration Scale 0 8/2/2018  2:55 PM   Infiltration Site Treatment Method  None 8/2/2018  2:55 PM   Extravasation? No 8/2/2018  2:55 PM   Number of days:0          Data:     All vital signs, laboratory and imaging data for the past 24 hours reviewed.      Vital signs:  Temp: 98.2  F (36.8  C) Temp src: Oral BP: (!) 138/98   Heart Rate: 91 Resp: 16 SpO2: 98 % O2 Device: None (Room air) Oxygen Delivery: 6 LPM Height: 162.6 cm (5' 4\") Weight: 55.8 kg (123 lb)    Pain: # Pain Assessment:  Current Pain Score 8/3/2018   Patient currently in pain? denies   Pain location -   Pain descriptors -   CPOT pain score -   Kecia rivas pain level was assessed and she currently denies pain.      Weight trend:   Vitals:    08/01/18 1711 08/03/18 0650   Weight: 54.9 kg (121 lb 1.6 oz) 55.8 kg (123 lb)          Labs    CMP:     Recent Labs  Lab 08/03/18  0624 08/02/18  1151 08/02/18  1100 " 08/01/18  0921     --  137 136   POTASSIUM 4.9  --  4.4 4.5   CHLORIDE 108  --  108 105   CO2 20  --  21 21   ANIONGAP 8  --  8 10   *  --  110* 123*   BUN 23  --  18 27   CR 1.23*  --  0.96 1.38*   GFRESTIMATED 45*  --  60* 40*   GFRESTBLACK 55*  --  73 48*   CHELSEY 8.2*  --  8.1* 9.1   MAG 1.7  --   --  1.7   PHOS 2.6  --   --   --    PROTTOTAL 5.7* 6.2*  --   --    ALBUMIN 2.5* 2.6*  --   --    BILITOTAL 0.3 0.2  --   --    ALKPHOS 72 87  --   --    AST 8 15  --   --    ALT 18 21  --   --      CBC:     Recent Labs  Lab 08/03/18  0624 08/02/18  2158 08/02/18  1151 08/02/18  1100 08/01/18  0921   WBC 5.9  --   --  6.1 6.9   RBC 2.44*  --   --  2.21* 2.54*   HGB 7.3* 7.7* 6.5* 6.5* 7.6*   HCT 23.6*  --   --  21.6* 25.6*   MCV 97  --   --  98 101*   MCH 29.9  --   --  29.4 29.9   MCHC 30.9*  --   --  30.1* 29.7*   RDW 15.0  --   --  14.7 14.5     --   --  301 407     INR:     Recent Labs  Lab 08/03/18  0624 08/01/18  0921   INR 1.09 1.28*     Glucose:     Recent Labs  Lab 08/03/18  0624 08/02/18  1100 08/01/18  0921   * 110* 123*     Blood Gas: No lab results found in last 7 days.  Culture Data     Recent Labs  Lab 08/02/18  1354   CULT Culture in progress  No growth after 16 hours  Culture negative after 16 hours  Culture negative monitoring continues  PENDING     Virology Data:   Lab Results   Component Value Date    FLUAH1 Negative 07/10/2018    FLUAH1 Negative 07/10/2018    FLUAH3 Negative 07/10/2018    FLUAH3 Negative 07/10/2018    [id number] Negative 07/10/2018    WS2369 Negative 07/10/2018    IFLUB Negative 07/10/2018    IFLUB Negative 07/10/2018    RSVA Negative 07/10/2018    RSVA Negative 07/10/2018    RSVB Negative 07/10/2018    RSVB Negative 07/10/2018    PIV1 Negative 07/10/2018    PIV1 Negative 07/10/2018    PIV2 Negative 07/10/2018    PIV2 Negative 07/10/2018    PIV3 Negative 07/10/2018    PIV3 Negative 07/10/2018    HMPV Negative 07/10/2018    HMPV Negative 07/10/2018    HRVS  Negative 07/10/2018    HRVS Negative 07/10/2018    ADVBE Negative 07/10/2018    ADVBE Negative 07/10/2018    ADVC Negative 07/10/2018    ADVC Negative 07/10/2018    ADVC Negative 04/10/2018     Historical CMV results (last 3 of prior testing):  Lab Results   Component Value Date    CMVQNT 6171 (A) 08/01/2018    CMVQNT 66217 (A) 07/10/2018    CMVQNT 9324 (A) 07/10/2018     Lab Results   Component Value Date    CMVLOG 3.8 (H) 08/01/2018    CMVLOG 4.3 (H) 07/10/2018    CMVLOG 4.0 (H) 07/10/2018     Urine Studies    Recent Labs   Lab Test  03/04/18   1425   URINEPH  5.5   NITRITE  Negative   LEUKEST  Negative   WBCU  5

## 2018-08-03 NOTE — PHARMACY-ANTICOAGULATION SERVICE
Clinical Pharmacy - Warfarin Dosing Consult     Pharmacy has been consulted to manage this patient s warfarin therapy.  Indication: DVT/PE Prophylaxis (Provoked 2nd DVT (4/16/18) w/ tx plan 6 months )  Therapy Goal: INR 2-3  Provider/Team: Pulmonary  OP Anticoag Clinic:  [facility]  Warfarin Prior to Admission: Yes  Warfarin PTA Regimen: 4mg MWFSat and 6mg AOD  Significant drug interactions: Enoxaparin   Recent documented change in oral intake/nutrition: No    INR   Date Value Ref Range Status   08/03/2018 1.09 0.86 - 1.14 Final   08/01/2018 1.28 (H) 0.86 - 1.14 Final       Recommend warfarin 4 mg today. H/H low, but no s/sx bleeding.   Pharmacy will monitor Kecia VILLAFANA Satrosalia daily and order warfarin doses to achieve specified goal.      Please contact pharmacy as soon as possible if the warfarin needs to be held for a procedure or if the warfarin goals change.      Hanna Tang, PGY-1 Pharmacy Resident

## 2018-08-03 NOTE — PLAN OF CARE
Problem: Patient Care Overview  Goal: Plan of Care/Patient Progress Review  RN:  1.Pt will maintain normal ventilation/oxygenation.  2.No s/s bleed.    Outcome: Improving  AVSS.  O2 sat 97% on RA.  A&O x4. Conversant. Pt up ambulating in halls with  last evening.  Tolerating activity well. Pt declined capnography/continuous O2 monitoring overnight.  Stool X1. Specimen sent to lab.  + for Cdiff.  MD notified. Pt placed on enteric isolation and started on Flagyl.  Recheck hgb 7.7-(was 6.5 yesterday prior to unit PRBC).  Pulmonary team aware.  Pt stable, pleasant. Resting between cares/assessments in no apparent distress.  Continue to follow s/p bronch/dilatation. Notify MD with further issues/concerns.

## 2018-08-04 VITALS
HEIGHT: 64 IN | TEMPERATURE: 98.7 F | SYSTOLIC BLOOD PRESSURE: 127 MMHG | DIASTOLIC BLOOD PRESSURE: 72 MMHG | RESPIRATION RATE: 20 BRPM | OXYGEN SATURATION: 99 % | WEIGHT: 124.4 LBS | BODY MASS INDEX: 21.24 KG/M2

## 2018-08-04 LAB
ANION GAP SERPL CALCULATED.3IONS-SCNC: 6 MMOL/L (ref 3–14)
BASOPHILS # BLD AUTO: 0 10E9/L (ref 0–0.2)
BASOPHILS NFR BLD AUTO: 0 %
BUN SERPL-MCNC: 19 MG/DL (ref 7–30)
CALCIUM SERPL-MCNC: 8.5 MG/DL (ref 8.5–10.1)
CHLORIDE SERPL-SCNC: 105 MMOL/L (ref 94–109)
CMV DNA SPEC NAA+PROBE-ACNC: ABNORMAL [IU]/ML
CMV DNA SPEC NAA+PROBE-LOG#: 6.5 {LOG_IU}/ML
CO2 SERPL-SCNC: 25 MMOL/L (ref 20–32)
CREAT SERPL-MCNC: 0.99 MG/DL (ref 0.52–1.04)
DIFFERENTIAL METHOD BLD: ABNORMAL
EOSINOPHIL # BLD AUTO: 0 10E9/L (ref 0–0.7)
EOSINOPHIL NFR BLD AUTO: 0 %
ERYTHROCYTE [DISTWIDTH] IN BLOOD BY AUTOMATED COUNT: 15.1 % (ref 10–15)
GFR SERPL CREATININE-BSD FRML MDRD: 58 ML/MIN/1.7M2
GLUCOSE SERPL-MCNC: 113 MG/DL (ref 70–99)
HCT VFR BLD AUTO: 24.4 % (ref 35–47)
HGB BLD-MCNC: 7.5 G/DL (ref 11.7–15.7)
INR PPP: 1.16 (ref 0.86–1.14)
LYMPHOCYTES # BLD AUTO: 0.2 10E9/L (ref 0.8–5.3)
LYMPHOCYTES NFR BLD AUTO: 3.5 %
MCH RBC QN AUTO: 29.9 PG (ref 26.5–33)
MCHC RBC AUTO-ENTMCNC: 30.7 G/DL (ref 31.5–36.5)
MCV RBC AUTO: 97 FL (ref 78–100)
MONOCYTES # BLD AUTO: 0.1 10E9/L (ref 0–1.3)
MONOCYTES NFR BLD AUTO: 0.9 %
NEUTROPHILS # BLD AUTO: 6.5 10E9/L (ref 1.6–8.3)
NEUTROPHILS NFR BLD AUTO: 95.6 %
PLATELET # BLD AUTO: 324 10E9/L (ref 150–450)
PLATELET # BLD EST: ABNORMAL 10*3/UL
POIKILOCYTOSIS BLD QL SMEAR: SLIGHT
POTASSIUM SERPL-SCNC: 4.6 MMOL/L (ref 3.4–5.3)
RBC # BLD AUTO: 2.51 10E12/L (ref 3.8–5.2)
SODIUM SERPL-SCNC: 136 MMOL/L (ref 133–144)
SPECIMEN SOURCE: ABNORMAL
WBC # BLD AUTO: 6.8 10E9/L (ref 4–11)

## 2018-08-04 PROCEDURE — 25000128 H RX IP 250 OP 636: Performed by: INTERNAL MEDICINE

## 2018-08-04 PROCEDURE — 25000131 ZZH RX MED GY IP 250 OP 636 PS 637: Performed by: INTERNAL MEDICINE

## 2018-08-04 PROCEDURE — 36415 COLL VENOUS BLD VENIPUNCTURE: CPT | Performed by: INTERNAL MEDICINE

## 2018-08-04 PROCEDURE — 25000125 ZZHC RX 250: Performed by: INTERNAL MEDICINE

## 2018-08-04 PROCEDURE — 80048 BASIC METABOLIC PNL TOTAL CA: CPT | Performed by: INTERNAL MEDICINE

## 2018-08-04 PROCEDURE — 25000131 ZZH RX MED GY IP 250 OP 636 PS 637: Performed by: PHYSICIAN ASSISTANT

## 2018-08-04 PROCEDURE — 25000132 ZZH RX MED GY IP 250 OP 250 PS 637: Performed by: PHYSICIAN ASSISTANT

## 2018-08-04 PROCEDURE — 25000132 ZZH RX MED GY IP 250 OP 250 PS 637: Performed by: INTERNAL MEDICINE

## 2018-08-04 PROCEDURE — 85025 COMPLETE CBC W/AUTO DIFF WBC: CPT | Performed by: INTERNAL MEDICINE

## 2018-08-04 PROCEDURE — 85610 PROTHROMBIN TIME: CPT | Performed by: INTERNAL MEDICINE

## 2018-08-04 RX ORDER — VANCOMYCIN HYDROCHLORIDE 50 MG/ML
125 KIT ORAL 4 TIMES DAILY
Qty: 90 ML | Refills: 0 | Status: SHIPPED | OUTPATIENT
Start: 2018-08-04 | End: 2018-08-20

## 2018-08-04 RX ORDER — WARFARIN SODIUM 3 MG/1
6 TABLET ORAL
Status: DISCONTINUED | OUTPATIENT
Start: 2018-08-04 | End: 2018-08-04 | Stop reason: HOSPADM

## 2018-08-04 RX ADMIN — PENTOXIFYLLINE 400 MG: 400 TABLET, FILM COATED, EXTENDED RELEASE ORAL at 09:11

## 2018-08-04 RX ADMIN — TACROLIMUS 5.5 MG: 5 CAPSULE ORAL at 09:11

## 2018-08-04 RX ADMIN — ENOXAPARIN SODIUM 60 MG: 60 INJECTION SUBCUTANEOUS at 06:06

## 2018-08-04 RX ADMIN — Medication 1 TABLET: at 12:23

## 2018-08-04 RX ADMIN — VANCOMYCIN HYDROCHLORIDE 125 MG: KIT at 09:11

## 2018-08-04 RX ADMIN — MULTIPLE VITAMINS W/ MINERALS TAB 1 TABLET: TAB at 12:22

## 2018-08-04 RX ADMIN — PANTOPRAZOLE SODIUM 40 MG: 40 TABLET, DELAYED RELEASE ORAL at 09:11

## 2018-08-04 RX ADMIN — VANCOMYCIN HYDROCHLORIDE 125 MG: KIT at 12:22

## 2018-08-04 RX ADMIN — PREDNISONE 7.5 MG: 5 TABLET ORAL at 09:11

## 2018-08-04 RX ADMIN — METOPROLOL TARTRATE 25 MG: 25 TABLET ORAL at 09:12

## 2018-08-04 RX ADMIN — SULFAMETHOXAZOLE AND TRIMETHOPRIM 1 TABLET: 800; 160 TABLET ORAL at 09:12

## 2018-08-04 RX ADMIN — MYCOPHENOLATE MOFETIL 1500 MG: 250 CAPSULE ORAL at 09:12

## 2018-08-04 RX ADMIN — NYSTATIN 1000000 UNITS: 100000 SUSPENSION ORAL at 09:12

## 2018-08-04 RX ADMIN — VALGANCICLOVIR 900 MG: 450 TABLET, FILM COATED ORAL at 09:11

## 2018-08-04 RX ADMIN — NYSTATIN 1000000 UNITS: 100000 SUSPENSION ORAL at 12:23

## 2018-08-04 RX ADMIN — MAGNESIUM OXIDE TAB 400 MG (241.3 MG ELEMENTAL MG) 400 MG: 400 (241.3 MG) TAB at 12:23

## 2018-08-04 ASSESSMENT — ACTIVITIES OF DAILY LIVING (ADL)
ADLS_ACUITY_SCORE: 10

## 2018-08-04 NOTE — PROGRESS NOTES
DISCHARGE   Discharged to: Home  Via: Automobile  Accompanied by: Family  Discharge Instructions: principal diagnosis, major findings/procedures, diet, activity, medications, follow up appointments, when to call the MD, and what to watchout for (i.e. s/s of infection, increasing SOB, palpitations, Angina). Pt states understanding of all discharge instructions.   Prescriptions: To be filled by discharge pharmacy per pt's request.  Follow Up Appointments: transplant coordinator to plan INR monitoring  Belongings: All sent with pt. Pt verifies that they are taking all belongings with them.   IV: discontinued.   Telemetry: discontinued.   Pt exhibits understanding of above discharge instructions; all questions answered.  Discharge Paperwork: faxed to discharge planner.

## 2018-08-04 NOTE — PLAN OF CARE
"Problem: Patient Care Overview  Goal: Plan of Care/Patient Progress Review  RN:  1.Pt will maintain normal ventilation/oxygenation.  2.No s/s bleed.     Outcome: No Change  /83 (BP Location: Left arm)  Temp 99.2  F (37.3  C) (Oral)  Resp 16  Ht 1.626 m (5' 4\")  Wt 55.8 kg (123 lb)  LMP 06/07/2014 (Exact Date)  SpO2 97%  BMI 21.11 kg/m2    D: Failure to thrive, elevated crt, CMV viremia, C diff. Hx lung transplant 3/1/18.  I/A: Monitored vitals and assessed pt status. VSS see flowsheet. SR/ST HR  w/ rPVC and 0-2 PAC. Denies pain, SOB, diarrhea. LR gtt at 150 mL/njh83esy. Voiding adequate amount in BR. Up independently in room. Sleeping between cares.  P: Continue to monitor and follow POC. Notify Pulmonary with changes.        "

## 2018-08-04 NOTE — DISCHARGE SUMMARY
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  Acute diarrhea   Failure to thrive   Anemia  Provoked left upper extremity DVT          Discharge Diagnosis:   C diff colitis  CMV viremia  History of bilateral lung transplant 3/1/2018, positive DSA  Right main bronchial stenosis s/p repeat dilation    RADHA, resolved   Oral lesions concerning for HSV  Failure to thrive   Anemia, stable   Provoked left upper extremity DVT          Procedures:   Bronchoscopy  Right main bronchus anastomosis balloon dilation   Bronchial washing           Medications Prior to Admission:     Prescriptions Prior to Admission   Medication Sig Dispense Refill Last Dose     ACETAMINOPHEN PO Take 1,000 mg by mouth every 6 hours as needed for pain   Taking     albuterol (PROAIR HFA/PROVENTIL HFA/VENTOLIN HFA) 108 (90 Base) MCG/ACT Inhaler Inhale 2 puffs into the lungs every 6 hours as needed for shortness of breath / dyspnea or wheezing 1 Inhaler 1 Taking     calcium-vitamin D (CALTRATE) 600-400 MG-UNIT per tablet Take 1 tablet by mouth daily        gabapentin (NEURONTIN) 100 MG capsule Take 1 capsule (100 mg) by mouth 2 times daily (Patient not taking: Reported on 8/1/2018) 60 capsule 3 Not Taking     levalbuterol (XOPENEX HFA) 45 MCG/ACT Inhaler Inhale 2 puffs into the lungs every 6 hours as needed for shortness of breath / dyspnea or wheezing 1 Inhaler 11 Taking     magnesium oxide (MAG-OX) 400 MG tablet Take 1 tablet (400 mg) by mouth daily 90 tablet 3 Taking     metoprolol tartrate (LOPRESSOR) 25 MG tablet Take 1 tablet (25 mg) by mouth 2  times daily 60 tablet 11 Taking     multivitamin, therapeutic with minerals (THERA-VIT-M) TABS tablet Take 1 tablet by mouth daily 30 each 11 Taking     mycophenolate (GENERIC EQUIVALENT) 250 MG capsule Take 6 capsules (1,500 mg) by mouth 2 times daily 360 capsule 11 Taking     nystatin (MYCOSTATIN) 388315 UNIT/ML suspension Take 10 mLs (1,000,000 Units) by mouth 4 times daily 1200 mL 5 Taking     pantoprazole (PROTONIX) 40 MG EC tablet Take 1 tablet (40 mg) by mouth every morning 30 tablet 11 Taking     pentoxifylline (TRENTAL) 400 MG CR tablet Take 1 tablet (400 mg) by mouth 2 times daily 60 tablet 3 Taking     predniSONE (DELTASONE) 2.5 MG tablet Take 1 tablet (2.5 mg) by mouth daily 6/22/18             7.5                    7.5  7/20/18             7.5                    5  8/24/18             5                       5  9/21/18             5                       2.5 90 tablet 1 Taking     predniSONE (DELTASONE) 5 MG tablet Follow taper card 180 tablet 3 Taking     sulfamethoxazole-trimethoprim (BACTRIM DS/SEPTRA DS) 800-160 MG per tablet Take 1 tablet by mouth daily X one month. 30 tablet 1 Taking     tacrolimus (GENERIC EQUIVALENT) 0.5 MG capsule Take 1 capsule (0.5 mg) by mouth every morning Along with five 1mg capsules for total dose of 5.5mg am and 6mg pm. 30 capsule 11 Taking     tacrolimus (GENERIC EQUIVALENT) 1 MG capsule Take 5 mg in the AM and 6 mg in the PM along with one 0.5mg in the am for total dose 5.5mg am and 6mg pm. 330 capsule 11 Taking     valGANciclovir (VALCYTE) 450 MG tablet Take 2 tablets (900 mg) by mouth daily 30 tablet 6      warfarin (COUMADIN) 1 MG tablet Take 4mg MWFSat and 6mg TuThSun, or as directed by the Medication Monitoring [facility] at the [facility]. 140 tablet 3 Taking             Discharge Medications:     Discharge Medication List as of 8/4/2018 12:00 PM      START taking these medications    Details   enoxaparin (LOVENOX) 60 MG/0.6ML injection Inject 0.6 mLs (60 mg)  Subcutaneous every 12 hours, Disp-12 Syringe, R-0, Local Print         CONTINUE these medications which have CHANGED    Details   vancomycin (FIRVANQ) 50 MG/ML SOLR Take 2.5 mLs (125 mg) by mouth 4 times daily, Disp-90 mL, R-0, E-Prescribe         CONTINUE these medications which have NOT CHANGED    Details   ACETAMINOPHEN PO Take 1,000 mg by mouth every 6 hours as needed for pain, Historical      albuterol (PROAIR HFA/PROVENTIL HFA/VENTOLIN HFA) 108 (90 Base) MCG/ACT Inhaler Inhale 2 puffs into the lungs every 6 hours as needed for shortness of breath / dyspnea or wheezing, Disp-1 Inhaler, R-1, E-Prescribe      calcium-vitamin D (CALTRATE) 600-400 MG-UNIT per tablet Take 1 tablet by mouth daily, Historical      levalbuterol (XOPENEX HFA) 45 MCG/ACT Inhaler Inhale 2 puffs into the lungs every 6 hours as needed for shortness of breath / dyspnea or wheezing, Disp-1 Inhaler, R-11, E-Prescribe      magnesium oxide (MAG-OX) 400 MG tablet Take 1 tablet (400 mg) by mouth daily, Disp-90 tablet, R-3, E-Prescribe      metoprolol tartrate (LOPRESSOR) 25 MG tablet Take 1 tablet (25 mg) by mouth 2 times daily, Disp-60 tablet, R-11, E-Prescribe      multivitamin, therapeutic with minerals (THERA-VIT-M) TABS tablet Take 1 tablet by mouth daily, Disp-30 each, R-11, E-Prescribe      mycophenolate (GENERIC EQUIVALENT) 250 MG capsule Take 6 capsules (1,500 mg) by mouth 2 times daily, Disp-360 capsule, R-11, E-Prescribe      nystatin (MYCOSTATIN) 308733 UNIT/ML suspension Take 10 mLs (1,000,000 Units) by mouth 4 times dailyDisp-1200 mL, [id number]      pantoprazole (PROTONIX) 40 MG EC tablet Take 1 tablet (40 mg) by mouth every morning, Disp-30 tablet, R-11, E-Prescribe      pentoxifylline (TRENTAL) 400 MG CR tablet Take 1 tablet (400 mg) by mouth 2 times daily, Disp-60 tablet, R-3, E-Prescribe      !! predniSONE (DELTASONE) 2.5 MG tablet Take 1 tablet (2.5 mg) by mouth daily 6/22/18             7.5                    7.5  7/20/18              7.5                    5  8/24/18             5                       5  9/21/18             5                       2.5, Disp-90 tablet, R-1, Local  Print      !! predniSONE (DELTASONE) 5 MG tablet Follow taper card, Disp-180 tablet, R-3, Local Print6/22/18             7.5                    7.5  7/20/18             7.5                    5  8/24/18             5                       5  9/21/18             5                       2.5      sulfamethoxazole-trimethoprim (BACTRIM DS/SEPTRA DS) 800-160 MG per tablet Take 1 tablet by mouth daily X one month., Disp-30 tablet, R-1, E-Prescribe      tacrolimus (GENERIC EQUIVALENT) 0.5 MG capsule Take 1 capsule (0.5 mg) by mouth every morning Along with five 1mg capsules for total dose of 5.5mg am and 6mg pm., Disp-30 capsule, R-11, E-PrescribeNew dose.  Patient needs supply.      tacrolimus (GENERIC EQUIVALENT) 1 MG capsule Take 5 mg in the AM and 6 mg in the PM along with one 0.5mg in the am for total dose 5.5mg am and 6mg pm., Disp-330 capsule, R-11, E-PrescribeNew dose      valGANciclovir (VALCYTE) 450 MG tablet Take 2 tablets (900 mg) by mouth daily, Disp-30 tablet, R-6, Historical      warfarin (COUMADIN) 1 MG tablet Take 4mg MWFSat and 6mg TuThSun, or as directed by the [facility] at the [facility] Disp-140 tablet, R-3, E-Prescribe       !! - Potential duplicate medications found. Please discuss with provider.      STOP taking these medications       gabapentin (NEURONTIN) 100 MG capsule Comments:   Reason for Stopping:                     Consultations:   No consultations were requested during this admission          Brief History of Illness:   Mrs. Kecia Blue is a 56 y/o female with h/o dermatomyositis, seronegative RA, ILD and pulmonary hypertension who was admitted for emergent lung transplant workup on 2/21. [facility] and subsequently received a bilateral lung transplant on 3/1/18.  "Post operative course complicated by right main bronchial stenosis, positive DSAs and CMV viremia. She is admitted from clinic for acute diarrhea, elevated Cr, CMV viremia and  FTT.    On the day of discharge: she is feeling good without any more diarrhea. Good appetite. No nausea, vomiting. No increased SOB. No other new complaints today.     On the day of physical exam:  Vital signs:  Temp: 98.7  F (37.1  C) Temp src: Oral BP: 127/72   Heart Rate: 89 Resp: 20 SpO2: 99 % O2 Device: None (Room air) Oxygen Delivery: 6 LPM Height: 162.6 cm (5' 4\") Weight: 56.4 kg (124 lb 6.4 oz)  Estimated body mass index is 21.35 kg/(m^2) as calculated from the following:    Height as of this encounter: 1.626 m (5' 4\").    Weight as of this encounter: 56.4 kg (124 lb 6.4 oz).  Constitutional: no acute distress  CV: RRR, S1/S2  Resp: CTAB  GI: Soft, nontender, nondistended, no hepatosplenomegaly  Neurological: no focal deficit, CN II-XII grossly intact  Psychiatric: cooperative, normal affect and mood  Skin: intact, warm, dry  Musculoskeletal: no deformity           Hospital Course:   1. C diff colitis: she presented with 3-4 days of watery diarrhea. C diff PCR was positive. C diff colitis was complicated by RADHA and possible by anemia (no overt blood in stool). She was started on vancomycin PO and will complete a 10 day course. Diarrhea was improving upon discharge.   2. CMV viremia: Improving. 10K on 7/20 to 6.2K on admission. Continued on valgancyclovir.   3. History of bilateral lung transplant 3/1/2018, positive DSA: continued on immunosupression, no changes made during hospitalization.   4. Right main bronchial stenosis s/p repeat dilation: underwent uneventful dilation on HD1. Cultures were NGTD at time of discharge.   5. RADHA, resolved: Mild RADHA to 1.38 from baseline of 1 upon admission. Resolved with IVF and treatment of C diff.   6. Oral lesions concerning for HSV: serum PCR negative, swab PCR pending.   7. Anemia: hemoglobin " of 7.6 on admission from recent baseline of ~8. No obvious bleeding. Dropped to 6.5 and required 1 unit PRBCs. Following transfusion remained stable in the mid 7s. She never showed overt bleeding but may have had some occult bleeding with C diff.   8. Provoked left upper extremity DVT: Diagnosed shortly after lung transplant. Has been on anticoagulation since 3/2018. Repeat US performed inpatient revealed improvement but not resolution of the left internal jugular clot. She will discharge on Lovenox and warfarin and plan for a total of 6 months of AC.     The patient was discussed with Dr. Hale.    Rekha Edwards MD  [facility]  [id number]             Discharge Instructions and Follow-Up:   Discharge diet: Regular   Discharge activity: Activity as tolerated   Discharge follow-up: Transplant clinic follow up will be arranged           Discharge Disposition:   Discharged to home

## 2018-08-05 LAB
ACID FAST STN SPEC QL: NORMAL
ACID FAST STN SPEC QL: NORMAL
CIDOFOVIR ISLT GENOTYP: NORMAL
FOSCARNET ISLT GENOTYP: NORMAL
GANCICLOVIR ISLT GENOTYP: NORMAL
SPECIMEN SOURCE: NORMAL

## 2018-08-06 ENCOUNTER — ANTICOAGULATION THERAPY VISIT (OUTPATIENT)
Dept: ANTICOAGULATION | Facility: CLINIC | Age: 58
End: 2018-08-06

## 2018-08-06 ENCOUNTER — CARE COORDINATION (OUTPATIENT)
Dept: CARE COORDINATION | Facility: CLINIC | Age: 58
End: 2018-08-06

## 2018-08-06 DIAGNOSIS — I82.629 DEEP VEIN THROMBOSIS (DVT) OF UPPER EXTREMITY, UNSPECIFIED CHRONICITY, UNSPECIFIED LATERALITY, UNSPECIFIED VEIN (H): ICD-10-CM

## 2018-08-06 DIAGNOSIS — Z94.2 LUNG TRANSPLANT RECIPIENT (H): ICD-10-CM

## 2018-08-06 NOTE — PROGRESS NOTES
Patient within 6 months of transplant. Patient has clinic visit within 24-48 hours of discharge so no post DC follow up call is needed.    Name: SARAI KILLIAN MRN: [id number]   Date: 8/6/2018 Status: [facility]   Time: 6:00 AM Length: 30     Visit Type: IP TREATMENT [922]   LIBBY:     Provider: Yudelka Gonzalez PT Department: [facility]

## 2018-08-06 NOTE — MR AVS SNAPSHOT
Kecia Blue   8/6/2018   Anticoagulation Therapy Visit    Description:  57 year old female   Provider:  Rufina Hanna, RN   Department:  [facility]           INR as of 8/6/2018     Today's INR       Anticoagulation Summary as of 8/6/2018     INR goal 2.0-3.0   Today's INR    Full warfarin instructions 8/6: 4 mg; Otherwise No maintenance plan   Next INR check 8/7/2018    Indications   Lung transplant recipient (H) [Z94.2]  Deep vein thrombosis (DVT) (H) [I82.409] [I82.409]         August 2018 Details    Sun Mon Tue Wed Thu Fri Sat        1               2               3               4                 5               6      4 mg   See details      7            8               9               10               11                 12               13               14               15               16               17               18                 19               20               21               22               23               24               25                 26               27               28               29               30               31                 Date Details   08/06 This INR check       Date of next INR:  8/7/2018         How to take your warfarin dose     To take:  4 mg Take 4 of the 1 mg tablets.

## 2018-08-06 NOTE — PROGRESS NOTES
Dates of hospitalization: 8/1 to 8/4  Reason for hospitalization: Diarrhea-C-difficile  Procedures performed: antibiotic, IVF  Vitamin K or FFP administered? no  Inpatient warfarin doses added to calendar? yes  Medication changes at discharge: Started Vanco, D/Hakeem Neurontin.  Warfarin dosing after DC: 4mg on MWFSat and 6mg on TuThSun  Patient discharged on Lovenox? Yes.  60mg every 12 hours.  Next INR date: 8/7  Where is the patient discharging to? (home, TCU, staying locally, etc.): home  Will patient have home care? no

## 2018-08-06 NOTE — PLAN OF CARE
Problem: Patient Care Overview  Goal: Plan of Care/Patient Progress Review  RN:  1.Pt will maintain normal ventilation/oxygenation.  2.No s/s bleed.     Physical Therapy Discharge Summary    Reason for therapy discharge:    All goals and outcomes met, no further needs identified.    Progress towards therapy goal(s). See goals on Care Plan in [facility] electronic health record for goal details.  Goals met    Therapy recommendation(s):    Continued therapy is recommended.  Rationale/Recommendations:  OP PT for knee ROM/strength.

## 2018-08-07 ENCOUNTER — TELEPHONE (OUTPATIENT)
Dept: TRANSPLANT | Facility: CLINIC | Age: 58
End: 2018-08-07

## 2018-08-07 DIAGNOSIS — Z94.2 LUNG TRANSPLANT RECIPIENT (H): Primary | ICD-10-CM

## 2018-08-07 DIAGNOSIS — B25.9 CMV (CYTOMEGALOVIRUS) (H): ICD-10-CM

## 2018-08-07 DIAGNOSIS — Z79.899 ENCOUNTER FOR LONG-TERM (CURRENT) USE OF HIGH-RISK MEDICATION: ICD-10-CM

## 2018-08-07 LAB
BACTERIA SPEC CULT: NORMAL
BACTERIA SPEC CULT: NORMAL
FUNGUS SPEC CULT: NORMAL
FUNGUS SPEC CULT: NORMAL
SPECIMEN SOURCE: NORMAL

## 2018-08-08 ENCOUNTER — TELEPHONE (OUTPATIENT)
Dept: PULMONOLOGY | Facility: CLINIC | Age: 58
End: 2018-08-08

## 2018-08-08 ENCOUNTER — ANTICOAGULATION THERAPY VISIT (OUTPATIENT)
Dept: ANTICOAGULATION | Facility: CLINIC | Age: 58
End: 2018-08-08

## 2018-08-08 DIAGNOSIS — Z94.2 LUNG TRANSPLANT RECIPIENT (H): ICD-10-CM

## 2018-08-08 DIAGNOSIS — I82.629 DEEP VEIN THROMBOSIS (DVT) OF UPPER EXTREMITY, UNSPECIFIED CHRONICITY, UNSPECIFIED LATERALITY, UNSPECIFIED VEIN (H): ICD-10-CM

## 2018-08-08 DIAGNOSIS — Z94.2 LUNG REPLACED BY TRANSPLANT (H): Primary | ICD-10-CM

## 2018-08-08 LAB
BACTERIA SPEC CULT: ABNORMAL
BACTERIA SPEC CULT: ABNORMAL
INR PPP: 1.73
SPECIMEN SOURCE: ABNORMAL

## 2018-08-08 NOTE — PROGRESS NOTES
ANTICOAGULATION FOLLOW-UP CLINIC VISIT    Patient Name:  Kecia Blue  Date:  8/8/2018  Contact Type:  Telephone    SUBJECTIVE:     Patient Findings     Comments Patient will continue with Lovenox 60mg BID. She is going out of town and won't be back until 8/13.           OBJECTIVE    INR   Date Value Ref Range Status   08/08/2018 1.73  Final       ASSESSMENT / PLAN  No question data found.  Anticoagulation Summary as of 8/8/2018     INR goal 2.0-3.0   Today's INR 1.73!   Warfarin maintenance plan No maintenance plan   Full warfarin instructions 8/8: 4 mg; 8/9: 6 mg; 8/10: 4 mg; 8/11: 6 mg; 8/12: 4 mg; Otherwise No maintenance plan   Next INR check 8/13/2018   Priority INR   Target end date     Indications   Lung transplant recipient (H) [Z94.2]  Deep vein thrombosis (DVT) (H) [I82.409] [I82.409]         Anticoagulation Episode Summary     INR check location Clinic Lab    Preferred lab     Send INR reminders to [facility] CLINIC    Comments Plan for 3 months of therapy- Provoked DVTHIPPA OK and  Verbal OK to speak with spouse and leave msg @ [phone].  labs @ [facility] starting ~ 5/25/18.  fax: [phone]. ph:[phone] BRONCH PLAN NOTE 6/6/18      Anticoagulation Care Providers     Provider Role Specialty Phone number    Ame Chow PA-C Responsible Pulmonary [phone]            See the Encounter Report to view Anticoagulation Flowsheet and Dosing Calendar (Go to Encounters tab in chart review, and find the Anticoagulation Therapy Visit)    Spoke with Kecia.     Ktaie Bush, RN

## 2018-08-08 NOTE — TELEPHONE ENCOUNTER
Spoke with patient to schedule procedure with Dr. Alana Lin   Procedure was scheduled on 08/20 at 12pm  Patient will have H&P with Pulmonary, [facility]  Patient is aware a / is needed day of surgery.   Surgery letter was sent via [employer], patient has my direct contact information for any further questions.

## 2018-08-08 NOTE — MR AVS SNAPSHOT
Kecia Blue   8/8/2018   Anticoagulation Therapy Visit    Description:  57 year old female   Provider:  Katie Bush, RN   Department:  [facility]           INR as of 8/8/2018     Today's INR 1.73!      Anticoagulation Summary as of 8/8/2018     INR goal 2.0-3.0   Today's INR 1.73!   Full warfarin instructions 8/8: 4 mg; 8/9: 6 mg; 8/10: 4 mg; 8/11: 6 mg; 8/12: 4 mg; Otherwise No maintenance plan   Next INR check 8/13/2018    Indications   Lung transplant recipient (H) [Z94.2]  Deep vein thrombosis (DVT) (H) [I82.409] [I82.409]         August 2018 Details    Sun Mon Tue Wed Thu Fri Sat        1               2               3               4                 5               6               7               8      4 mg   See details      9      6 mg         10      4 mg         11      6 mg           12      4 mg         13            14               15               16               17               18                 19               20               21               22               23               24               25                 26               27               28               29               30               31                 Date Details   08/08 This INR check       Date of next INR:  8/13/2018         How to take your warfarin dose     To take:  4 mg Take 4 of the 1 mg tablets.    To take:  6 mg Take 6 of the 1 mg tablets.

## 2018-08-09 ENCOUNTER — TELEPHONE (OUTPATIENT)
Dept: TRANSPLANT | Facility: CLINIC | Age: 58
End: 2018-08-09

## 2018-08-09 NOTE — TELEPHONE ENCOUNTER
OR 8/20/18 at 12 noon.  10am check in.  Pre op appt 8/16/18 with Ame HUFF    Confirmed with patient.

## 2018-08-09 NOTE — TELEPHONE ENCOUNTER
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  No worsening respiratory symptoms.  Per Dr Hale, start active culture yogurt BID.  If cannot take yogurt, give her lactobacillus capsules.  Review Thursday in clinic.  If needed, can start Flagyl.    Patient called and she will start yogurt BID.  She will call office if there are any changes.

## 2018-08-10 ENCOUNTER — TELEPHONE (OUTPATIENT)
Dept: TRANSPLANT | Facility: CLINIC | Age: 58
End: 2018-08-10

## 2018-08-10 NOTE — TELEPHONE ENCOUNTER
Per Ame Chow, okay to set up pre op appt 8/20/18 at 0700 with routine testing.  OR bronch that same day at 12 noon, check in at 10am to 3C.  Okayed by patient.  Cancel 8/16/18 clinic appt and move testing to 8/20/18

## 2018-08-10 NOTE — Clinical Note
Ame Osei okayed 8/20/18 0700 clinic appt (you will need to get template done for it) with labs, pft, CXR prior to appt. Patient approved this appt. Cancel appt and tests for 8/16/18.  This was okay by Ame DE LA GARZA so patient did not have to drive twice for appts. migueltn has OR bronch check in at 10 am on that day 8/20/18. Call patient with confirmation. Thanks Magaly

## 2018-08-13 ENCOUNTER — ANTICOAGULATION THERAPY VISIT (OUTPATIENT)
Dept: ANTICOAGULATION | Facility: CLINIC | Age: 58
End: 2018-08-13

## 2018-08-13 ENCOUNTER — TELEPHONE (OUTPATIENT)
Dept: TRANSPLANT | Facility: CLINIC | Age: 58
End: 2018-08-13

## 2018-08-13 DIAGNOSIS — I82.629 DEEP VEIN THROMBOSIS (DVT) OF UPPER EXTREMITY, UNSPECIFIED CHRONICITY, UNSPECIFIED LATERALITY, UNSPECIFIED VEIN (H): ICD-10-CM

## 2018-08-13 DIAGNOSIS — Z94.2 LUNG TRANSPLANT RECIPIENT (H): ICD-10-CM

## 2018-08-13 LAB — INR PPP: 2.11

## 2018-08-13 NOTE — TELEPHONE ENCOUNTER
Returned call from Kecia.  The change from clinic 8/16 to 8/20 has not been made yet.  Will monitor.  Reassured her it will be done.

## 2018-08-13 NOTE — TELEPHONE ENCOUNTER
Patient Call: General  Route to LPN    Reason for call: Regarding 8/16/18 appts.  Has questions    Call back needed? Yes    Return Call Needed  Same as documented in contacts section  When to return call?: Greater than one day: Route standard priority

## 2018-08-13 NOTE — PROGRESS NOTES
ANTICOAGULATION FOLLOW-UP CLINIC VISIT    Patient Name:  Kecia Blue  Date:  8/13/2018  Contact Type:  Telephone    SUBJECTIVE:     Patient Findings     Positives No Problem Findings    Comments The lovenox is D/C'd.  Pt to have a bronchoscopy on 8/20.  She has not yet been given any instructions re: lovenox or holding the coumadin.  Email communication is sent to Dr. Lin, and MERRICK Chow with the same plan for bridging that was implemented in June. Will await their reply.  Pt is aware to contact us by afternoon of 8/15 if she has not yet heard from us.            OBJECTIVE    INR   Date Value Ref Range Status   08/13/2018 2.11  Final       ASSESSMENT / PLAN  INR assessment THER    Recheck INR In: 1 WEEK    INR Location Outside lab      Anticoagulation Summary as of 8/13/2018     INR goal 2.0-3.0   Today's INR 2.11   Warfarin maintenance plan No maintenance plan   Full warfarin instructions 8/13: 6 mg; 8/14: 6 mg; Otherwise No maintenance plan   Next INR check 8/20/2018   Priority INR   Target end date     Indications   Lung transplant recipient (H) [Z94.2]  Deep vein thrombosis (DVT) (H) [I82.409] [I82.409]         Anticoagulation Episode Summary     INR check location Clinic Lab    Preferred lab     Send INR reminders to [facility] CLINIC    Comments Plan for 3 months of therapy- Provoked DVTHIPPA OK and  Verbal OK to speak with spouse and leave msg @ [phone].  labs @ [facility] starting ~ 5/25/18.  fax: [phone]. ph:[phone] BRONCH PLAN NOTE 6/6/18      Anticoagulation Care Providers     Provider Role Specialty Phone number    Ame Chow PA-C Responsible Pulmonary [phone]            See the Encounter Report to view Anticoagulation Flowsheet and Dosing Calendar (Go to Encounters tab in chart review, and find the Anticoagulation Therapy Visit)    Spoke with the pt - see above notation     Judith Salazar RN     ADDENDUM from 8/13:  See email communication  below            Perfect - appreciate your prompt reply.  I have notified the pt of this plan & we are good to go.  Judith Salazar RN  ===View-only below this line===    ----- Message -----     From: Ame Chow PA-C     Sent: 8/13/2018   3:09 PM       To: Wilber Lin MD, Judith Salazar, RN, *  Subject: RE: bridging for bronch                          Yes, I think that's fine for the upcoming procedure.    ----- Message -----     From: Judith Salazar, LOUISE     Sent: 8/13/2018   2:49 PM       To: Wilber Lin MD, *  Subject: bridging for bronch                              Good Afternoon:  Your pt JS is scheduled for a bronch on 8/20.  In June we followed this plan for bridging for her.  Please let us know if you desire this same plan:  8/15: no coumadin, no lovenox  8/16: lovenox in the PM only   8/17 & 8/18:  lovenox Q 12 hr.  8/19: lovenox in the AM only  8/20: day of procedure - no lovenox.  All doses of lovenox: 60 mg.  Pt to inquire of MD doing procedure when the lovenox and coumadin can be restarted.  We will await your reply.  Thank you,  Judith Salazar RN  [facility]  [phone]

## 2018-08-13 NOTE — MR AVS SNAPSHOT
Kecia Blue   8/13/2018   Anticoagulation Therapy Visit    Description:  57 year old female   Provider:  Judith Salazar, RN   Department:  [facility]           INR as of 8/13/2018     Today's INR 2.11      Anticoagulation Summary as of 8/13/2018     INR goal 2.0-3.0   Today's INR 2.11   Full warfarin instructions 8/13: 6 mg; 8/14: 6 mg; Otherwise No maintenance plan   Next INR check 8/20/2018    Indications   Lung transplant recipient (H) [Z94.2]  Deep vein thrombosis (DVT) (H) [I82.409] [I82.409]         August 2018 Details    Sun Mon Tue Wed Thu Fri Sat        1               2               3               4                 5               6               7               8               9               10               11                 12               13      6 mg   See details      14      6 mg         15               16               17               18                 19               20            21               22               23               24               25                 26               27               28               29               30               31                 Date Details   08/13 This INR check       Date of next INR:  8/20/2018         How to take your warfarin dose     To take:  6 mg Take 6 of the 1 mg tablets.

## 2018-08-14 LAB
BACTERIA SPEC CULT: ABNORMAL
BACTERIA SPEC CULT: ABNORMAL
Lab: ABNORMAL
SPECIMEN SOURCE: ABNORMAL

## 2018-08-15 ENCOUNTER — ALLIED HEALTH/NURSE VISIT (OUTPATIENT)
Dept: PHARMACY | Facility: CLINIC | Age: 58
End: 2018-08-15
Attending: INTERNAL MEDICINE
Payer: COMMERCIAL

## 2018-08-15 DIAGNOSIS — A04.72 C. DIFFICILE COLITIS: ICD-10-CM

## 2018-08-15 DIAGNOSIS — Z94.2 LUNG TRANSPLANT RECIPIENT (H): ICD-10-CM

## 2018-08-15 DIAGNOSIS — E63.9 NUTRITIONAL DEFICIENCY: ICD-10-CM

## 2018-08-15 DIAGNOSIS — R52 PAIN: ICD-10-CM

## 2018-08-15 DIAGNOSIS — I48.91 ATRIAL FIBRILLATION, UNSPECIFIED TYPE (H): ICD-10-CM

## 2018-08-15 DIAGNOSIS — I82.629 DEEP VEIN THROMBOSIS (DVT) OF UPPER EXTREMITY, UNSPECIFIED CHRONICITY, UNSPECIFIED LATERALITY, UNSPECIFIED VEIN (H): Primary | ICD-10-CM

## 2018-08-15 PROCEDURE — 99605 MTMS BY PHARM NP 15 MIN: CPT | Performed by: PHARMACIST

## 2018-08-15 PROCEDURE — 99607 MTMS BY PHARM ADDL 15 MIN: CPT | Performed by: PHARMACIST

## 2018-08-15 NOTE — LETTER
Cheikh Mcdonnell,     It was so nice to speak with you on August 15th.  I hope I was able to give you some useful information during our Medication Therapy Management (MTM) visit. The purpose of this visit with a clinical pharmacist was to review the medicines you received when discharged from the hospital. We want to make sure that you know which medicines to take and what they are for. We also want to make sure all your medicines are working, safe, and as easy to take as possible.    Enclosed is a summary of the suggestions we talked about and any other thoughts I had. There is also a list of your medicines included. This information has also been shared with your primary care provider.    Feel free to call if you have any questions or concerns. By working together with you and your doctor, I hope to help you feel confident managing your medicines and improving your quality of life.       Best wishes,         Katie Seay, [facility] Pharmacist.   [phone]

## 2018-08-15 NOTE — Clinical Note
It appears, due to CrCl, Valcyte dose may need to be lowered. Please see note. Thank you, Katie Seay, [facility] Pharmacist.  [phone]

## 2018-08-15 NOTE — PROGRESS NOTES
SUBJECTIVE/OBJECTIVE:                Kecia Blue is a 57 year old female called for a transitions of care visit.  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     Chief Complaint: None.  Personal Healthcare Goals: get stronger from surgery.     Allergies/ADRs: Reviewed in Epic  Tobacco: No tobacco use   Alcohol: none  Caffeine: 4 cups/week of tea  Activity: goes to rehab  PMH: Reviewed in Epic    Medication Adherence/Access:  Patient uses pill box(es) and uses a pill dispenser.  Patient takes medications 4 time(s) per day.  Per patient, misses medication 0 times per week.   Medication barriers: none.   The patient fills medications at [place]: NO, fills medications at [facility].    DVT: Reports is using enoxaparin  60 mg subcu every 12 hours.  Reports no difficulties using this medication.  Reports is also using warfarin 4 mg on Monday Wednesday Friday and Saturday, and is using 6 mg on Tuesdays Thursdays and Sundays.  Reports no unusual bleeding or bruising.    C. Difficile colitis: Reports just finished therapy with vancomycin 50 mg/mL solution, and used 125 mg by mouth 4 times daily.  Reports tolerating medication well, and feels symptoms have mostly improved and is almost resolved.    Lung Transplant:  Current immunosuppressants include Tacrolimus 5.5 mg qAM, 6 mg qPM (0-12 months post tx, goal 10-15), (on 08/01/18, Tac level was 10 micrograms/L) MMF 1500 mg qAM & 1500 mg qPM (goal 0-6 month post tx: 1-3.5), and Prednisone 7.5 mg qAM, 5 mg qPM, then will taper further on Thursday.  Pt reports no side effects.  Reports also uses albuterol inhaler once in a while, but has not used in at least one month.  Reports she also takes  pentoxifylline 400 mg twice daily, for circulation in her legs.  Reports she is tolerating these medications well.  Transplant date: 03/01/18  Estimated Creatinine Clearance: 57.2 mL/min (based on Cr of 0.99).   CMV prophylaxis: CrCl 40 to 59 mL/minute: Valcyte 450 mg once daily, pt reports is taking Valcyte 900 mg daily. Donor (+), Recipient (-), treat 6 months post tx.  PCP prophylaxis: Bactrim DS. Reports is taking 2 tablets daily, will see MD on Monday, and believes she will go back to one tablet daily.  Thrush prophylaxis:Nystatin 6 months post tx  PPI use: Pantoprazole 40 mg, qam  Current supplements for electrolyte replacement: Mag Oxide 400 mg daily, ( 2 hours from MMF) .  Tx Coordinator: Was hemanth Rivas, Tx MD: Dr. Brantley, Using Med Card: Yes, Lab Frequency:every week, and every two weeks for some.  Recent Infections:  CMV, and C-diff  Recent Hospitalizations: 08/01/18  Home BPs: twice per day, upper 130's  And 80's  Date last skin check: skin in October uses sunscreen yes, and is buying SPF 50 cloths, company in [employer] that makes these.  Date last dentist appt: October 2017  Immunizations: annual flu shot yes, Pneumovaxx:  yes; TDaP:  yes    Afib: Reports takes metoprolol tartrate 25 mg tablet, twice daily.  Reports no A. fib symptoms.    Supplements: On 08/04/18, Ca+ level was 8.5 mg/dl. On 02/19/18, Vit D level was 55 micrograms/L. Reports takes multivitamin with minerals, 1 tablet daily. Reports takes calcium with vitamin D 600/400 mg-U, takes 1 tablet daily.    Pain: Reports takes Tylenol 1000 mg as needed. Reports she has not had to take this for a while, and never exceeds 3000 mg per day.    ASSESSMENT:                 Current medications were reviewed today.      Medication Adherence: excellent, no issues identified    DVT: Appears stable.  Per patient account she is tolerating medication well, is able to administer medication without difficulty,  is following up with her MD, and  is not experiencing any adverse effects. No considerations for changes in medication    C. Difficile colitis: Improved.  Patient just finished antibiotic therapy, and is following up with her doctor.  It appears she tolerated medication well.    Lung Transplant: Per creatinine clearance, it appears Valcyte dose should be 450 mg daily.  Patient is taking 900 mg daily.  May consider lowering Valcyte dose.  Tacrolimus level stable.  There is no mycophenolate level on file MD may consider checking this.  Patient appears to be tolerating medications very well.     Afib: Reports takes metoprolol tartrate 25 mg tablet, twice daily.  Reports no A. fib symptoms.    Supplements: Stable.     Pain: Stable.     PLAN:                  1) MD may consider lowering Valcyle dose to 450 mg daily.      I spent 45 minutes with this patient today. I offer these suggestions with the understanding that I don't fully understand Kecia's past medical history and the complexity of her health conditions. Kecia should make no changes without the approval of her physician. A copy of the visit note was provided to the patient's primary care and transplant provider.    Will follow up with Marcio Navarro, Jose D.     The patient was mailed a summary of these recommendations as an after visit summary.    Katie Seay, [facility] Pharmacist.   [phone]

## 2018-08-15 NOTE — MR AVS SNAPSHOT
After Visit Summary   8/15/2018    Kecia Blue    MRN: [id number]           Patient Information     Date Of Birth          1960        Visit Information        Provider Department      8/15/2018 9:00 AM Katie Seay, [facility] and Infectious Diseases MTM        Care Instructions    Recommendations from today's MTM visit:                                                    MTM (medication therapy management) is a service provided by a clinical pharmacist designed to help you get the most of out of your medicines.   Today we reviewed what your medicines are for, how to know if they are working, that your medicines are safe and how to make your medicine regimen as easy as possible.     1. Your Valcyte dose may have to be lowered, due to your kidney function. Please discuss this with your doctor. I also sent your doctor a note about this.    2. Please follow up in the future with Marcio Navarro PharmD. He can be reached at, [phone].      Next MTM visit: Per Marcio Navarro PharmD.    To schedule another MTM appointment, please call the clinic directly or you may call the MTM scheduling line at [phone] or toll-free at [phone].     My Clinical Pharmacist's contact information:                                                      It was a pleasure talking to you today!  Please feel free to contact me with any questions or concerns you have.      Katie Seay, MTM Pharmacist.   [phone]      You may receive a survey about the MTM services you received.  I would appreciate your feedback to help me serve you better in the future. Please fill it out and return it when you can. Your comments will be anonymous.      My healthcare goals:                                                      Get stronger from surgery.                 Follow-ups after your visit        Your next 10 appointments already scheduled     Aug 20, 2018  6:00 AM CDT   Lab with UC LAB   [facility] ([facility])    [address]  1st Floor  [facility] [address] [phone]            Aug 20, 2018  6:15 AM CDT   XR CHEST 2 VIEWS with [email or web address]   [facility] ([facility])    [address]  1st Floor  [facility] [id number] [phone]           Please bring a list of your current medicines to your exam. (Include vitamins, minerals and over-thecounter medicines.) Leave your valuables at home.  Tell your doctor if there is a chance you may be pregnant.  You do not need to do anything special for this exam.            Aug 20, 2018  6:30 AM CDT   PFT VISIT with  PFL MCKINLEY   [facility] Pulmonary Function Testing ([facility])    [address]  3rd Floor  [facility] [id number] [phone]            Aug 20, 2018  7:00 AM CDT   (Arrive by 6:45 AM)   Return Lung Transplant with Ame Chow PA-C   [facility] ([facility])    [address]  [address]  [facility] [id number] [phone]            Aug 20, 2018   Procedure with Wilber Lin MD   [facility] [place], Same Day Surgery (--)    [address]  [facility] [address] [phone]            Aug 22, 2018  2:00 PM CDT   (Arrive by 1:45 PM)   New Patient Visit with Audra Snyder DO   [facility] and Infectious Diseases ([facility])    [address] [place] [address]  [address]  [facility] [id number] [phone]              Who to contact     If you have questions or need follow up information about today's clinic visit or your schedule please contact [facility] AND INFECTIOUS DISEASES [facility] directly at [phone].  Normal or non-critical lab and imaging results will be communicated to you by MyChart, letter or phone within 4 business days after the clinic has received the results. If you do not hear from  us within 7 days, please contact the clinic through [email or web address] or phone. If you have a critical or abnormal lab result, we will notify you by phone as soon as possible.  Submit refill requests through [email or web address] or call your pharmacy and they will forward the refill request to us. Please allow 3 business days for your refill to be completed.          Additional Information About Your Visit        [email or web address][employer]comhart Information     [email or web address] gives you secure access to your electronic health record. If you see a primary care provider, you can also send messages to your care team and make appointments. If you have questions, please call your primary care clinic.  If you do not have a primary care provider, please call [phone] and they will assist you.        Care EveryWhere ID     This is your Care EveryWhere ID. This could be used by other organizations to access your [facility] medical records  [id number]        Your Vitals Were     Last Period                   06/07/2014 (Exact Date)            Blood Pressure from Last 3 Encounters:   08/04/18 127/72   08/01/18 132/68   07/10/18 140/82    Weight from Last 3 Encounters:   08/04/18 124 lb 6.4 oz (56.4 kg)   07/10/18 118 lb 6.2 oz (53.7 kg)   07/09/18 119 lb 11.2 oz (54.3 kg)              Today, you had the following     No orders found for display       Primary Care Provider Office Phone # Fax #    Rosy MARTINEZ MD Horace [phone]       [facility]  [address]  [facility] [address]        Equal Access to Services     OLIVA VALENCIA : Hadii aad ku hadasho Soomaali, waaxda luqadaha, qaybta kaalmada adeegyada, morro kentin haydionyn tammy iqbal'mckayla . So [facility] [phone].    ATENCIÓN: Si habla español, tiene a taylor disposición servicios gratuitos de asistencia lingüística. Llame al [phone].    We comply with applicable federal civil rights laws and [place] laws. We do not discriminate on the basis of race, color, national origin, age, disability,  sex, sexual orientation, or gender identity.            Thank you!     Thank you for choosing [facility] AND INFECTIOUS DISEASES [facility]  for your care. Our goal is always to provide you with excellent care. Hearing back from our patients is one way we can continue to improve our services. Please take a few minutes to complete the written survey that you may receive in the mail after your visit with us. Thank you!             Your Updated Medication List - Protect others around you: Learn how to safely use, store and throw away your medicines at www.disposemymeds.org.          This list is accurate as of 8/15/18 11:59 PM.  Always use your most recent med list.                   Brand Name Dispense Instructions for use Diagnosis    ACETAMINOPHEN PO      Take 1,000 mg by mouth every 6 hours as needed for pain        albuterol 108 (90 Base) MCG/ACT inhaler    PROAIR HFA/PROVENTIL HFA/VENTOLIN HFA    1 Inhaler    Inhale 2 puffs into the lungs every 6 hours as needed for shortness of breath / dyspnea or wheezing    SOB (shortness of breath), Wheezing       calcium-vitamin D 600-400 MG-UNIT per tablet    CALTRATE     Take 1 tablet by mouth daily    S/P lung transplant (H), ILD (interstitial lung disease) (H), Lung transplant recipient (H), Encounter for long-term (current) use of high-risk medication, Anemia, unspecified type, Cytomegalovirus (CMV) viremia (H)       enoxaparin 60 MG/0.6ML injection    LOVENOX    12 Syringe    Inject 0.6 mLs (60 mg) Subcutaneous every 12 hours    Deep vein thrombosis (DVT) of upper extremity, unspecified chronicity, unspecified laterality, unspecified vein (H)       levalbuterol 45 MCG/ACT Inhaler    XOPENEX HFA    1 Inhaler    Inhale 2 puffs into the lungs every 6 hours as needed for shortness of breath / dyspnea or wheezing    S/P lung transplant (H), Other constipation       magnesium oxide 400 MG tablet    MAG-OX    90 tablet    Take 1 tablet (400 mg) by mouth daily     Hypomagnesemia       metoprolol tartrate 25 MG tablet    LOPRESSOR    60 tablet    Take 1 tablet (25 mg) by mouth 2 times daily    Paroxysmal atrial fibrillation (H)       multivitamin, therapeutic with minerals Tabs tablet     30 each    Take 1 tablet by mouth daily    Lung transplant recipient (H)       mycophenolate 250 MG capsule    GENERIC EQUIVALENT    360 capsule    Take 6 capsules (1,500 mg) by mouth 2 times daily    S/P lung transplant (H)       nystatin 595683 UNIT/ML suspension    MYCOSTATIN    1200 mL    Take 10 mLs (1,000,000 Units) by mouth 4 times daily    Lung transplant recipient (H)       pantoprazole 40 MG EC tablet    PROTONIX    30 tablet    Take 1 tablet (40 mg) by mouth every morning    Gastroesophageal reflux disease without esophagitis       pentoxifylline 400 MG CR tablet    TRENtal    60 tablet    Take 1 tablet (400 mg) by mouth 2 times daily    Lung transplant recipient (H)       * predniSONE 2.5 MG tablet    DELTASONE    90 tablet    Take 1 tablet (2.5 mg) by mouth daily 6/22/18             7.5                    7.5 7/20/18             7.5                    5 8/24/18             5                       5 9/21/18             5                       2.5    Lung replaced by transplant (H)       * predniSONE 5 MG tablet    DELTASONE    180 tablet    Follow taper card    S/P lung transplant (H)       sulfamethoxazole-trimethoprim 800-160 MG per tablet    BACTRIM DS/SEPTRA DS    30 tablet    Take 1 tablet by mouth daily X one month.    Actinomyces infection, Lung replaced by transplant (H)       * tacrolimus 1 MG capsule    GENERIC EQUIVALENT    330 capsule    Take 5 mg in the AM and 6 mg in the PM along with one 0.5mg in the am for total dose 5.5mg am and 6mg pm.    S/P lung transplant (H), Other constipation       * tacrolimus 0.5 MG capsule    GENERIC EQUIVALENT    30 capsule    Take 1 capsule (0.5 mg) by mouth every morning Along with five 1mg capsules for total dose of 5.5mg am and 6mg  pm.    S/P lung transplant (H)       VALCYTE 450 MG tablet   Generic drug:  valGANciclovir     30 tablet    Take 2 tablets (900 mg) by mouth daily    Cytomegalovirus (CMV) viremia (H), ILD (interstitial lung disease) (H), Lung transplant recipient (H), Encounter for long-term (current) use of high-risk medication, Anemia, unspecified type, S/P lung transplant (H)       vancomycin 50 MG/ML Solr    FIRVANQ    90 mL    Take 2.5 mLs (125 mg) by mouth 4 times daily    C. difficile colitis       warfarin 1 MG tablet    COUMADIN    140 tablet    Take 4mg MWFSat and 6mg TuThSun, or as directed by the [facility]    Status post lung transplantation (H), DVT (deep venous thrombosis) (H)       * Notice:  This list has 4 medication(s) that are the same as other medications prescribed for you. Read the directions carefully, and ask your doctor or other care provider to review them with you.

## 2018-08-16 NOTE — PATIENT INSTRUCTIONS
Recommendations from today's MTM visit:                                                    MTM (medication therapy management) is a service provided by a clinical pharmacist designed to help you get the most of out of your medicines.   Today we reviewed what your medicines are for, how to know if they are working, that your medicines are safe and how to make your medicine regimen as easy as possible.     1. Your Valcyte dose may have to be lowered, due to your kidney function. Please discuss this with your doctor. I also sent your doctor a note about this.    2. Please follow up in the future with Marcio Navarro PharmD. He can be reached at, [phone].      Next MTM visit: Per Marcio Navarro PharmD.    To schedule another MTM appointment, please call the clinic directly or you may call the MTM scheduling line at [phone] or toll-free at [phone].     My Clinical Pharmacist's contact information:                                                      It was a pleasure talking to you today!  Please feel free to contact me with any questions or concerns you have.      Katie Seay, MTM Pharmacist.   [phone]      You may receive a survey about the MTM services you received.  I would appreciate your feedback to help me serve you better in the future. Please fill it out and return it when you can. Your comments will be anonymous.      My healthcare goals:                                                      Get stronger from surgery.

## 2018-08-17 ENCOUNTER — ANTICOAGULATION THERAPY VISIT (OUTPATIENT)
Dept: ANTICOAGULATION | Facility: CLINIC | Age: 58
End: 2018-08-17

## 2018-08-17 DIAGNOSIS — Z94.2 LUNG TRANSPLANT RECIPIENT (H): ICD-10-CM

## 2018-08-17 DIAGNOSIS — I82.629 DEEP VEIN THROMBOSIS (DVT) OF UPPER EXTREMITY, UNSPECIFIED CHRONICITY, UNSPECIFIED LATERALITY, UNSPECIFIED VEIN (H): ICD-10-CM

## 2018-08-17 LAB — INR PPP: 1.7

## 2018-08-17 NOTE — MR AVS SNAPSHOT
Kecia Blue   8/17/2018   Anticoagulation Therapy Visit    Description:  57 year old female   Provider:  Anju Meyers RN   Department:  [facility]           INR as of 8/17/2018     Today's INR 1.7!      Anticoagulation Summary as of 8/17/2018     INR goal 2.0-3.0   Today's INR 1.7!   Full warfarin instructions 8/17: Hold; 8/18: Hold; 8/19: Hold; Otherwise No maintenance plan   Next INR check 8/20/2018    Indications   Lung transplant recipient (H) [Z94.2]  Deep vein thrombosis (DVT) (H) [I82.409] [I82.409]         August 2018 Details    Sun Mon Tue Wed Thu Fri Sat        1               2               3               4                 5               6               7               8               9               10               11                 12               13               14               15               16               17      Hold   See details      18      Hold           19      Hold         20            21               22               23               24               25                 26               27               28               29               30               31                 Date Details   08/17 This INR check       Date of next INR:  8/20/2018         How to take your warfarin dose     Hold Do not take your warfarin dose. See the Details table to the right for additional instructions.

## 2018-08-17 NOTE — PROGRESS NOTES
ANTICOAGULATION FOLLOW-UP CLINIC VISIT    Patient Name:  Kecia Blue  Date:  8/17/2018  Contact Type:  Telephone    SUBJECTIVE:     Patient Findings     Positives Intentional hold of therapy (intentional hold 8/15/18 - 8/19/18 before bronch)    Comments Kecia is bridging with lovenox for bronch.  She will check with provider as to when it is safe for her to restart warfarin and lovenox after procedure.             OBJECTIVE    INR   Date Value Ref Range Status   08/17/2018 1.7  Final       ASSESSMENT / PLAN  INR assessment SUB    Recheck INR In: 3 DAYS    INR Location Clinic      Anticoagulation Summary as of 8/17/2018     INR goal 2.0-3.0   Today's INR 1.7!   Warfarin maintenance plan No maintenance plan   Full warfarin instructions 8/17: Hold; 8/18: Hold; 8/19: Hold; Otherwise No maintenance plan   Next INR check 8/20/2018   Priority INR   Target end date     Indications   Lung transplant recipient (H) [Z94.2]  Deep vein thrombosis (DVT) (H) [I82.409] [I82.409]         Anticoagulation Episode Summary     INR check location Clinic Lab    Preferred lab     Send INR reminders to [facility]    Comments Plan for 3 months of therapy- Provoked DVTHIPPA OK and  Verbal OK to speak with spouse and leave msg @ [phone].  labs @ [facility] starting ~ 5/25/18.  fax: [phone]. ph:[phone] BRONCH PLAN NOTE 6/6/18      Anticoagulation Care Providers     Provider Role Specialty Phone number    Claudine mAe Patel PA-C Responsible Pulmonary [phone]            See the Encounter Report to view Anticoagulation Flowsheet and Dosing Calendar (Go to Encounters tab in chart review, and find the Anticoagulation Therapy Visit)    Spoke with Kecia.  She is holding for bronch on 8/20/18.      Anju Meyers RN

## 2018-08-19 NOTE — PROGRESS NOTES
[facility]  [facility]  August 20, 2018         Assessment and Plan:   Mrs. Kecia Blue is a 58 y/o female with h/o dermatomyositis, seronegative RA, ILD and pulmonary hypertension s/p bilateral lung transplant on 3/1/18. Post operative course complicated by right main bronchial stenosis, positive DSAs, CMV viremia and recent admissipon 8/1-8/4 for FTT and diarrhea secondary to C.difficile. She is seen today for post discharge follow up and pre operative clearance.     Coordinator/MD: CANDY/AL    1. C diff colitis: per PCR on 8/2. Notes her stools are very slowly improving, less loose, but still about 3 /day. Completed 10 day course of oral vancomycin.      2. CMV viremia: last serum CMV of 6171 on 8/1/18. Resistance panel with pansensitivity.   - CMV pending from today  - Continue valcyte 900 mg daily    3. Oral lesions concerning for HSV: on left lower lip area. Appear consistent with HSV, although serum and skin PCR negative. Nearly resolved.        4. S/p bilateral lung transplant: feels dyspneic with minimal activity, notes increased non productive cough, tightness stable. Sating 100% on room air. CXR reviewed by me today demonstrates stable transplant with small left effusion. PFTs improved from prior, FEV1 increased 4% to hear her post transplant best. Scheduled for bronch in OR today  - Continue immunosuppresion including mycophenolate 1500 mg BID, tacrolimus (goal 10-15) and prednisone taper   - Current prophylaxis with Valcyte 900 mg daily (CMV D+/R+) and nystatin (through 9/1)  - On ekaterina     5. Right main bronchial stenosis s/p dilatation: most recently on 8/1. BAL + for actinomyces and gardnerella vaginalis and per discussion with coordinator, patient is supposed to be on 1 DS Bactrim daily with f/u with ID.  - Continue pentoxyfilline and Bactrim 1 DS tablet daily (for a total duration of one month from 8/2)  - Okay for bronch in OR later today    6. RADHA  with Cr up to 1.37 today, from .99 at discharge. Likely pre renal from ongoing loose stools.  - F/u on tacrolimus level  - Will give 2 L IVF today during her bronch  - BMP twice weekly for the next few weeks, IVF near home PRN    7. Nausea and early satiety: not much improved since discharge, feels it's worsened with the Bactrim. Wants to eat, but gets full quickly. No epigastric pain, but notes increased cough and rhinorrhea. CXR demonstrates ongoing dilated loops of bowel, stable from prior, but not evaluated during last admission.  - Increase pantoprazole to 40 mg BID  - CT abdomen/pelvis  - Gastroenterology referral     8. Positive DSA: from 8/1 with DQB7 (down to 800 from 1576)   - Ordered DSA at next f/u     9. Anemia: with hgb of 7.6 down from baseline 8-9s. No s/s of bleeding. Received 1 U PRBC during admission, suspect possible occult bleeding from C diff. Hgb is stable at 7.5. Iron panel, folate/B12 WNL.  - Monitor      10. Provoked LUE DVT: on ultrasound on 3/7 demonstrating occlusive thrombus in the left subclavian vein, axillary vein, basilic vein in the upper arm, cephalic vein near junction with the subclavian vein and cephalic vein from mid arm to the wrist. Repeat US on 4/16 with decreased subclavian thrombus and resolved axillary thrombus, but new (vs previously obscured) right IJ clot. On warfarin, goal INR 2-3.   - Coumadin on hold for bronch  - Will need at least 6 months of AC (mid Oct)  - US prior to discontinuation      11. High risk donor: will need 12 months labs.       12. HTN: BP has been stable.   - Continue metoprolol 25 mg BID     13.  Left knee pain: with effusion following last day of rehab on the leg press. S/p steroid injection with slight improvement in pain, but still has a lot of joint instability, improving.   - PT/OT to assess in the fall     Chronic issus:  1. Dermatomyositis    RTC: pending bronch results  Influenza and other vaccinations:   Annual dermatology  visit:    Ame Chow PA-C  Pulmonary, Allergy, Critical Care and Sleep Medicine        Interval History:   Overall, diarrhea is improving, still soft around 2/day. Nausea has improved, mainly in the am before pills, gets better with eating. No vomiting, but does endorse dry heaving. No abdominal pain or cramps. Notes shortness of breath with activity, but notes her O2 is 98-99. No shortness of breath with rest, coughing a lot, productive of slight amounts of spit. Wheezing has improved, no tightness. No fever or chills. No night sweats. Appetite is still poor, doesn't take much to fill her up. Sleeping fine.           Review of Systems:   Please see HPI, otherwise the complete 10 point ROS is negative.           Past Medical and Surgical History:     Past Medical History:   Diagnosis Date     Antisynthetase syndrome (H) 2014     Chronic cough      Dehydration 8/1/2018     Dermatomyositis (H)      Dysplasia of cervix, low grade (ESTRADA 1)      ILD (interstitial lung disease) (H)      Osteopenia      PONV (postoperative nausea and vomiting)      Pulmonary hypertension      Raynaud's disease      Seronegative rheumatoid arthritis (H)      Past Surgical History:   Procedure Laterality Date     BRONCHOSCOPY (RIGID OR FLEXIBLE), DIAGNOSTIC N/A 4/10/2018    Procedure: COMBINED BRONCHOSCOPY (RIGID OR FLEXIBLE), LAVAGE;;  Surgeon: Mariposa Donohue MD;  [facility]     BRONCHOSCOPY FLEXIBLE N/A 3/13/2018    Procedure: BRONCHOSCOPY FLEXIBLE;  Flexible Bronchoscopy ;  Surgeon: Gissell Sanchez MD;  [facility]     BRONCHOSCOPY FLEXIBLE N/A 5/9/2018    Procedure: BRONCHOSCOPY FLEXIBLE;;  Surgeon: Wilber Lin MD;  [facility]     BRONCHOSCOPY RIGID N/A 6/6/2018    Procedure: BRONCHOSCOPY RIGID;;  Surgeon: Lopez Macias MD;  [facility]     BRONCHOSCOPY, DILATE BRONCHUS, STENT BRONCHUS, COMBINED N/A 6/11/2018    Procedure: COMBINED BRONCHOSCOPY, DILATE BRONCHUS, STENT BRONCHUS;  Flexible  Bronchoscopy, Balloon Dilation, Bronchial Washings;  Surgeon: Wilber Lin MD;  [facility]     BRONCHOSCOPY, DILATE BRONCHUS, STENT BRONCHUS, COMBINED Right 7/10/2018    Procedure: COMBINED BRONCHOSCOPY, DILATE BRONCHUS, STENT BRONCHUS;  Flexible Bronchoscopy, Balloon Dilation, Bronchial Washings  ;  Surgeon: Wilber Lin MD;  [facility]     BRONCHOSCOPY, DILATE BRONCHUS, STENT BRONCHUS, COMBINED N/A 8/2/2018    Procedure: COMBINED BRONCHOSCOPY, DILATE BRONCHUS, STENT BRONCHUS;  Flexible Bronchoscopy, Bronchial Washings, Balloon Dilation;  Surgeon: Wilber Lin MD;  [facility]     ENT SURGERY      tonsillectomy as a child     INSERT EXTRACORPORAL MEMBRANE OXYGENATOR ADULT (OUTSIDE OR) N/A 2/27/2018    Procedure: INSERT EXTRACORPORAL MEMBRANE OXYGENATOR ADULT (OUTSIDE OR);  INSERT EXTRACORPORAL MEMBRANE OXYGENATOR ADULT (OUTSIDE OR) ;  Surgeon: Toby Hernandez MD;  [facility]     no prior surgery       REMOVE EXTRACORPORAL MEMBRANE OXYGENATOR ADULT N/A 3/3/2018    Procedure: REMOVE EXTRACORPORAL MEMBRANE OXYGENATOR ADULT;  Removal of Right Femoral Venous and Right Axillary Arterial Extracorporeal Membrane Oxygenator;  Surgeon: Toby Hernandez MD;  [facility]     TRANSPLANT LUNG RECIPIENT SINGLE X2 Bilateral 3/1/2018    Procedure: TRANSPLANT LUNG RECIPIENT SINGLE X2;  Median Sternotomy, Extracorporeal Membrane Oxygenator, bilateral sequential lung transplant;  Surgeon: Toby Hernandez MD;  [facility]           Family History:     Family History   Problem Relation Age of Onset     Hypertension Mother      Arthritis Mother      Pancreatic Cancer Father      Diabetes Father             Social History:     Social History     Social History     Marital status:      Spouse name: N/A     Number of children: N/A     Years of education: N/A     Occupational History     Not on file.     Social History Main Topics     Smoking  status: Never Smoker     Smokeless tobacco: Never Used     Alcohol use No     Drug use: No     Sexual activity: Not on file     Other Topics Concern     Parent/Sibling W/ Cabg, Mi Or Angioplasty Before 65f 55m? No     Social History Narrative            Medications:     No current facility-administered medications for this visit.      No current outpatient prescriptions on file.     Facility-Administered Medications Ordered in Other Visits   Medication     lactated ringers infusion     lidocaine (LMX4) cream     lidocaine 1 % 1 mL     PRE OP antibiotics NOT needed for this surgical procedure.     sodium chloride (PF) 0.9% PF flush 3 mL     sodium chloride (PF) 0.9% PF flush 3 mL     sodium chloride 0.9% infusion            Physical Exam:   /73 (BP Location: Right arm, Patient Position: Chair, Cuff Size: Adult Regular)  Pulse 100  Resp 16  Wt 53.2 kg (117 lb 4.8 oz)  LMP 06/07/2014 (Exact Date)  SpO2 100%  BMI 20.13 kg/m2    GENERAL: alert, NAD  HEENT: NCAT, EOMI, no scleral icterus, oral mucosa moist and without lesions  Neck: no cervical or supraclavicular adenopathy  Lungs: moderate airflow, few scattered rhonchi  CV: RRR, S1S2, no murmurs noted  Abdomen: normoactive BS, soft, non tender   Lymph: no edema  Neuro: AAO X 3, CN 2-12 grossly intact  Psychiatric: normal affect, good eye contact  Skin: no rash, jaundice or lesions on limited exam         Data:   All laboratory and imaging data reviewed.      Recent Results (from the past 168 hour(s))   INR    Collection Time: 08/17/18 12:00 AM   Result Value Ref Range    INR 1.7    Basic metabolic panel    Collection Time: 08/20/18  5:52 AM   Result Value Ref Range    Sodium 134 133 - 144 mmol/L    Potassium 4.2 3.4 - 5.3 mmol/L    Chloride 100 94 - 109 mmol/L    Carbon Dioxide 25 20 - 32 mmol/L    Anion Gap 9 3 - 14 mmol/L    Glucose 90 70 - 99 mg/dL    Urea Nitrogen 24 7 - 30 mg/dL    Creatinine 1.37 (H) 0.52 - 1.04 mg/dL    GFR Estimate 40 (L) >60  mL/min/1.7m2    GFR Estimate If Black 48 (L) >60 mL/min/1.7m2    Calcium 9.3 8.5 - 10.1 mg/dL   Magnesium    Collection Time: 08/20/18  5:52 AM   Result Value Ref Range    Magnesium 1.6 1.6 - 2.3 mg/dL   CBC with platelets    Collection Time: 08/20/18  5:52 AM   Result Value Ref Range    WBC 8.2 4.0 - 11.0 10e9/L    RBC Count 2.45 (L) 3.8 - 5.2 10e12/L    Hemoglobin 7.5 (L) 11.7 - 15.7 g/dL    Hematocrit 25.2 (L) 35.0 - 47.0 %     (H) 78 - 100 fl    MCH 30.6 26.5 - 33.0 pg    MCHC 29.8 (L) 31.5 - 36.5 g/dL    RDW 16.5 (H) 10.0 - 15.0 %    Platelet Count 587 (H) 150 - 450 10e9/L   Folate    Collection Time: 08/20/18  5:53 AM   Result Value Ref Range    Folate 43.4 >5.4 ng/mL   INR    Collection Time: 08/20/18  5:53 AM   Result Value Ref Range    INR 1.03 0.86 - 1.14   General PFT Lab (Please always keep checked)    Collection Time: 08/20/18  6:22 AM   Result Value Ref Range    FVC-Pred 3.24 L    FVC-Pre 1.83 L    FVC-%Pred-Pre 56 %    FEV1-Pre 1.22 L    FEV1-%Pred-Pre 47 %    FEV1FVC-Pred 79 %    FEV1FVC-Pre 66 %    FEFMax-Pred 6.43 L/sec    FEFMax-Pre 3.57 L/sec    FEFMax-%Pred-Pre 55 %    FEF2575-Pred 2.44 L/sec    FEF2575-Pre 0.69 L/sec    XUM1082-%Pred-Pre 28 %    ExpTime-Pre 7.03 sec    FIFMax-Pre 2.64 L/sec    FEV1FEV6-Pred 81 %    FEV1FEV6-Pre 66 %     PFT interpretation:  Maneuver: valid and meets ATS guidelines  Severe obstruction with decreased FEV1 and FEV1/FVC  Compared to prior: FEV1 of 1.22 is 110 ml above prior  The decrease in FVC may represent air trapping v. restrictive physiology.  Lung volumes would be necessary to determine.

## 2018-08-20 ENCOUNTER — ANESTHESIA EVENT (OUTPATIENT)
Dept: SURGERY | Facility: CLINIC | Age: 58
End: 2018-08-20
Payer: COMMERCIAL

## 2018-08-20 ENCOUNTER — RADIANT APPOINTMENT (OUTPATIENT)
Dept: GENERAL RADIOLOGY | Facility: CLINIC | Age: 58
End: 2018-08-20
Attending: INTERNAL MEDICINE
Payer: COMMERCIAL

## 2018-08-20 ENCOUNTER — ANESTHESIA (OUTPATIENT)
Dept: SURGERY | Facility: CLINIC | Age: 58
End: 2018-08-20
Payer: COMMERCIAL

## 2018-08-20 ENCOUNTER — TELEPHONE (OUTPATIENT)
Dept: TRANSPLANT | Facility: CLINIC | Age: 58
End: 2018-08-20

## 2018-08-20 ENCOUNTER — ANTICOAGULATION THERAPY VISIT (OUTPATIENT)
Dept: ANTICOAGULATION | Facility: CLINIC | Age: 58
End: 2018-08-20

## 2018-08-20 ENCOUNTER — HOSPITAL ENCOUNTER (OUTPATIENT)
Dept: CT IMAGING | Facility: CLINIC | Age: 58
End: 2018-08-20
Attending: PHYSICIAN ASSISTANT | Admitting: INTERNAL MEDICINE
Payer: COMMERCIAL

## 2018-08-20 ENCOUNTER — TELEPHONE (OUTPATIENT)
Dept: INFECTIOUS DISEASES | Facility: CLINIC | Age: 58
End: 2018-08-20

## 2018-08-20 ENCOUNTER — HOSPITAL ENCOUNTER (OUTPATIENT)
Facility: CLINIC | Age: 58
Discharge: HOME | End: 2018-08-20
Attending: INTERNAL MEDICINE | Admitting: INTERNAL MEDICINE
Payer: COMMERCIAL

## 2018-08-20 ENCOUNTER — SURGERY (OUTPATIENT)
Age: 58
End: 2018-08-20

## 2018-08-20 ENCOUNTER — OFFICE VISIT (OUTPATIENT)
Dept: PULMONOLOGY | Facility: CLINIC | Age: 58
End: 2018-08-20
Attending: PHYSICIAN ASSISTANT
Payer: COMMERCIAL

## 2018-08-20 VITALS
HEART RATE: 100 BPM | SYSTOLIC BLOOD PRESSURE: 139 MMHG | BODY MASS INDEX: 20.13 KG/M2 | DIASTOLIC BLOOD PRESSURE: 73 MMHG | WEIGHT: 117.3 LBS | OXYGEN SATURATION: 100 % | RESPIRATION RATE: 16 BRPM

## 2018-08-20 VITALS
HEIGHT: 64 IN | DIASTOLIC BLOOD PRESSURE: 77 MMHG | OXYGEN SATURATION: 97 % | BODY MASS INDEX: 20.06 KG/M2 | WEIGHT: 117.5 LBS | SYSTOLIC BLOOD PRESSURE: 117 MMHG | RESPIRATION RATE: 16 BRPM | TEMPERATURE: 98.4 F

## 2018-08-20 DIAGNOSIS — Z94.2 LUNG TRANSPLANT RECIPIENT (H): ICD-10-CM

## 2018-08-20 DIAGNOSIS — J84.9 ILD (INTERSTITIAL LUNG DISEASE) (H): ICD-10-CM

## 2018-08-20 DIAGNOSIS — J98.09 BRONCHIAL STENOSIS: Primary | ICD-10-CM

## 2018-08-20 DIAGNOSIS — R19.7 DIARRHEA: ICD-10-CM

## 2018-08-20 DIAGNOSIS — K21.9 GASTROESOPHAGEAL REFLUX DISEASE WITHOUT ESOPHAGITIS: ICD-10-CM

## 2018-08-20 DIAGNOSIS — Z94.2 S/P LUNG TRANSPLANT (H): ICD-10-CM

## 2018-08-20 DIAGNOSIS — R11.0 NAUSEA: ICD-10-CM

## 2018-08-20 DIAGNOSIS — Z94.2 LUNG TRANSPLANT RECIPIENT (H): Primary | ICD-10-CM

## 2018-08-20 DIAGNOSIS — Z94.2 H/O LUNG TRANSPLANT (H): ICD-10-CM

## 2018-08-20 DIAGNOSIS — B25.9 CYTOMEGALOVIRUS (CMV) VIREMIA (H): ICD-10-CM

## 2018-08-20 DIAGNOSIS — D64.9 ANEMIA, UNSPECIFIED TYPE: ICD-10-CM

## 2018-08-20 DIAGNOSIS — Z79.899 ENCOUNTER FOR LONG-TERM (CURRENT) USE OF HIGH-RISK MEDICATION: ICD-10-CM

## 2018-08-20 DIAGNOSIS — I82.409 DEEP VEIN THROMBOSIS (DVT) (H): ICD-10-CM

## 2018-08-20 DIAGNOSIS — I82.629 DEEP VEIN THROMBOSIS (DVT) OF UPPER EXTREMITY, UNSPECIFIED CHRONICITY, UNSPECIFIED LATERALITY, UNSPECIFIED VEIN (H): ICD-10-CM

## 2018-08-20 DIAGNOSIS — Z94.2 LUNG REPLACED BY TRANSPLANT (H): ICD-10-CM

## 2018-08-20 DIAGNOSIS — J84.9 ILD (INTERSTITIAL LUNG DISEASE) (H): Primary | ICD-10-CM

## 2018-08-20 LAB
ANION GAP SERPL CALCULATED.3IONS-SCNC: 9 MMOL/L (ref 3–14)
BUN SERPL-MCNC: 24 MG/DL (ref 7–30)
CALCIUM SERPL-MCNC: 9.3 MG/DL (ref 8.5–10.1)
CHLORIDE SERPL-SCNC: 100 MMOL/L (ref 94–109)
CO2 SERPL-SCNC: 25 MMOL/L (ref 20–32)
CREAT SERPL-MCNC: 1.37 MG/DL (ref 0.52–1.04)
ERYTHROCYTE [DISTWIDTH] IN BLOOD BY AUTOMATED COUNT: 16.5 % (ref 10–15)
EXPTIME-PRE: 5.43 SEC
EXPTIME-PRE: 7.03 SEC
FEF2575-%PRED-PRE: 23 %
FEF2575-%PRED-PRE: 28 %
FEF2575-PRE: 0.58 L/SEC
FEF2575-PRE: 0.69 L/SEC
FEF2575-PRED: 2.44 L/SEC
FEF2575-PRED: 2.44 L/SEC
FEFMAX-%PRED-PRE: 35 %
FEFMAX-%PRED-PRE: 55 %
FEFMAX-PRE: 2.27 L/SEC
FEFMAX-PRE: 3.57 L/SEC
FEFMAX-PRED: 6.43 L/SEC
FEFMAX-PRED: 6.44 L/SEC
FEV1-%PRED-PRE: 35 %
FEV1-%PRED-PRE: 47 %
FEV1-PRE: 0.93 L
FEV1-PRE: 1.22 L
FEV1FEV6-PRE: 66 %
FEV1FEV6-PRE: 66 %
FEV1FEV6-PRED: 81 %
FEV1FEV6-PRED: 81 %
FEV1FVC-PRE: 62 %
FEV1FVC-PRE: 66 %
FEV1FVC-PRED: 79 %
FEV1FVC-PRED: 79 %
FIFMAX-PRE: 2.64 L/SEC
FIFMAX-PRE: 3.33 L/SEC
FOLATE SERPL-MCNC: 43.4 NG/ML
FVC-%PRED-PRE: 46 %
FVC-%PRED-PRE: 56 %
FVC-PRE: 1.49 L
FVC-PRE: 1.83 L
FVC-PRED: 3.24 L
FVC-PRED: 3.24 L
GFR SERPL CREATININE-BSD FRML MDRD: 40 ML/MIN/1.7M2
GLUCOSE SERPL-MCNC: 90 MG/DL (ref 70–99)
HCT VFR BLD AUTO: 25.2 % (ref 35–47)
HGB BLD-MCNC: 7.5 G/DL (ref 11.7–15.7)
INR PPP: 1.03 (ref 0.86–1.14)
MAGNESIUM SERPL-MCNC: 1.6 MG/DL (ref 1.6–2.3)
MCH RBC QN AUTO: 30.6 PG (ref 26.5–33)
MCHC RBC AUTO-ENTMCNC: 29.8 G/DL (ref 31.5–36.5)
MCV RBC AUTO: 103 FL (ref 78–100)
PLATELET # BLD AUTO: 587 10E9/L (ref 150–450)
POTASSIUM SERPL-SCNC: 4.2 MMOL/L (ref 3.4–5.3)
RBC # BLD AUTO: 2.45 10E12/L (ref 3.8–5.2)
SODIUM SERPL-SCNC: 134 MMOL/L (ref 133–144)
TACROLIMUS BLD-MCNC: 12.1 UG/L (ref 5–15)
TME LAST DOSE: NORMAL H
WBC # BLD AUTO: 8.2 10E9/L (ref 4–11)

## 2018-08-20 PROCEDURE — 83735 ASSAY OF MAGNESIUM: CPT | Performed by: INTERNAL MEDICINE

## 2018-08-20 PROCEDURE — 74176 CT ABD & PELVIS W/O CONTRAST: CPT

## 2018-08-20 PROCEDURE — 25000128 H RX IP 250 OP 636: Performed by: NURSE ANESTHETIST, CERTIFIED REGISTERED

## 2018-08-20 PROCEDURE — 37000009 ZZH ANESTHESIA TECHNICAL FEE, EACH ADDTL 15 MIN: Performed by: INTERNAL MEDICINE

## 2018-08-20 PROCEDURE — 36415 COLL VENOUS BLD VENIPUNCTURE: CPT | Performed by: INTERNAL MEDICINE

## 2018-08-20 PROCEDURE — C9399 UNCLASSIFIED DRUGS OR BIOLOG: HCPCS | Performed by: NURSE ANESTHETIST, CERTIFIED REGISTERED

## 2018-08-20 PROCEDURE — 27210794 ZZH OR GENERAL SUPPLY STERILE: Performed by: INTERNAL MEDICINE

## 2018-08-20 PROCEDURE — 82746 ASSAY OF FOLIC ACID SERUM: CPT | Performed by: PHYSICIAN ASSISTANT

## 2018-08-20 PROCEDURE — 25000128 H RX IP 250 OP 636: Performed by: INTERNAL MEDICINE

## 2018-08-20 PROCEDURE — 36000059 ZZH SURGERY LEVEL 3 EA 15 ADDTL MIN UMMC: Performed by: INTERNAL MEDICINE

## 2018-08-20 PROCEDURE — 71000014 ZZH RECOVERY PHASE 1 LEVEL 2 FIRST HR: Performed by: INTERNAL MEDICINE

## 2018-08-20 PROCEDURE — C1726 CATH, BAL DIL, NON-VASCULAR: HCPCS | Performed by: INTERNAL MEDICINE

## 2018-08-20 PROCEDURE — 36000057 ZZH SURGERY LEVEL 3 1ST 30 MIN - UMMC: Performed by: INTERNAL MEDICINE

## 2018-08-20 PROCEDURE — 40000171 ZZH STATISTIC PRE-PROCEDURE ASSESSMENT III: Performed by: INTERNAL MEDICINE

## 2018-08-20 PROCEDURE — 85027 COMPLETE CBC AUTOMATED: CPT | Performed by: INTERNAL MEDICINE

## 2018-08-20 PROCEDURE — 85610 PROTHROMBIN TIME: CPT | Performed by: PHYSICIAN ASSISTANT

## 2018-08-20 PROCEDURE — 80048 BASIC METABOLIC PNL TOTAL CA: CPT | Performed by: INTERNAL MEDICINE

## 2018-08-20 PROCEDURE — 71000027 ZZH RECOVERY PHASE 2 EACH 15 MINS: Performed by: INTERNAL MEDICINE

## 2018-08-20 PROCEDURE — 80197 ASSAY OF TACROLIMUS: CPT | Performed by: INTERNAL MEDICINE

## 2018-08-20 PROCEDURE — 37000008 ZZH ANESTHESIA TECHNICAL FEE, 1ST 30 MIN: Performed by: INTERNAL MEDICINE

## 2018-08-20 PROCEDURE — 25000125 ZZHC RX 250: Performed by: NURSE ANESTHETIST, CERTIFIED REGISTERED

## 2018-08-20 PROCEDURE — G0463 HOSPITAL OUTPT CLINIC VISIT: HCPCS | Mod: ZF

## 2018-08-20 RX ORDER — ONDANSETRON 2 MG/ML
4 INJECTION INTRAMUSCULAR; INTRAVENOUS EVERY 30 MIN PRN
Status: DISCONTINUED | OUTPATIENT
Start: 2018-08-20 | End: 2018-08-20 | Stop reason: HOSPADM

## 2018-08-20 RX ORDER — FENTANYL CITRATE 50 UG/ML
25-50 INJECTION, SOLUTION INTRAMUSCULAR; INTRAVENOUS
Status: DISCONTINUED | OUTPATIENT
Start: 2018-08-20 | End: 2018-08-20 | Stop reason: HOSPADM

## 2018-08-20 RX ORDER — LIDOCAINE 40 MG/G
CREAM TOPICAL
Status: DISCONTINUED | OUTPATIENT
Start: 2018-08-20 | End: 2018-08-20 | Stop reason: HOSPADM

## 2018-08-20 RX ORDER — SODIUM CHLORIDE, SODIUM LACTATE, POTASSIUM CHLORIDE, CALCIUM CHLORIDE 600; 310; 30; 20 MG/100ML; MG/100ML; MG/100ML; MG/100ML
INJECTION, SOLUTION INTRAVENOUS CONTINUOUS
Status: DISCONTINUED | OUTPATIENT
Start: 2018-08-20 | End: 2018-08-20 | Stop reason: HOSPADM

## 2018-08-20 RX ORDER — LABETALOL HYDROCHLORIDE 5 MG/ML
10 INJECTION, SOLUTION INTRAVENOUS
Status: DISCONTINUED | OUTPATIENT
Start: 2018-08-20 | End: 2018-08-20 | Stop reason: HOSPADM

## 2018-08-20 RX ORDER — FENTANYL CITRATE 50 UG/ML
INJECTION, SOLUTION INTRAMUSCULAR; INTRAVENOUS PRN
Status: DISCONTINUED | OUTPATIENT
Start: 2018-08-20 | End: 2018-08-20

## 2018-08-20 RX ORDER — SODIUM CHLORIDE, SODIUM LACTATE, POTASSIUM CHLORIDE, CALCIUM CHLORIDE 600; 310; 30; 20 MG/100ML; MG/100ML; MG/100ML; MG/100ML
INJECTION, SOLUTION INTRAVENOUS CONTINUOUS PRN
Status: DISCONTINUED | OUTPATIENT
Start: 2018-08-20 | End: 2018-08-20

## 2018-08-20 RX ORDER — HYDRALAZINE HYDROCHLORIDE 20 MG/ML
2.5-5 INJECTION INTRAMUSCULAR; INTRAVENOUS EVERY 10 MIN PRN
Status: DISCONTINUED | OUTPATIENT
Start: 2018-08-20 | End: 2018-08-20 | Stop reason: HOSPADM

## 2018-08-20 RX ORDER — MEPERIDINE HYDROCHLORIDE 50 MG/ML
12.5 INJECTION INTRAMUSCULAR; INTRAVENOUS; SUBCUTANEOUS
Status: DISCONTINUED | OUTPATIENT
Start: 2018-08-20 | End: 2018-08-20 | Stop reason: HOSPADM

## 2018-08-20 RX ORDER — ONDANSETRON 2 MG/ML
INJECTION INTRAMUSCULAR; INTRAVENOUS PRN
Status: DISCONTINUED | OUTPATIENT
Start: 2018-08-20 | End: 2018-08-20

## 2018-08-20 RX ORDER — HYDROMORPHONE HYDROCHLORIDE 1 MG/ML
.3-.5 INJECTION, SOLUTION INTRAMUSCULAR; INTRAVENOUS; SUBCUTANEOUS EVERY 10 MIN PRN
Status: DISCONTINUED | OUTPATIENT
Start: 2018-08-20 | End: 2018-08-20 | Stop reason: HOSPADM

## 2018-08-20 RX ORDER — NALOXONE HYDROCHLORIDE 0.4 MG/ML
.1-.4 INJECTION, SOLUTION INTRAMUSCULAR; INTRAVENOUS; SUBCUTANEOUS
Status: DISCONTINUED | OUTPATIENT
Start: 2018-08-20 | End: 2018-08-20 | Stop reason: HOSPADM

## 2018-08-20 RX ORDER — PROPOFOL 10 MG/ML
INJECTION, EMULSION INTRAVENOUS PRN
Status: DISCONTINUED | OUTPATIENT
Start: 2018-08-20 | End: 2018-08-20

## 2018-08-20 RX ORDER — LIDOCAINE HYDROCHLORIDE 20 MG/ML
INJECTION, SOLUTION INFILTRATION; PERINEURAL PRN
Status: DISCONTINUED | OUTPATIENT
Start: 2018-08-20 | End: 2018-08-20

## 2018-08-20 RX ORDER — SODIUM CHLORIDE 9 MG/ML
INJECTION, SOLUTION INTRAVENOUS CONTINUOUS
Status: DISCONTINUED | OUTPATIENT
Start: 2018-08-20 | End: 2018-08-20 | Stop reason: HOSPADM

## 2018-08-20 RX ORDER — PROPOFOL 10 MG/ML
INJECTION, EMULSION INTRAVENOUS CONTINUOUS PRN
Status: DISCONTINUED | OUTPATIENT
Start: 2018-08-20 | End: 2018-08-20

## 2018-08-20 RX ORDER — PANTOPRAZOLE SODIUM 40 MG/1
40 TABLET, DELAYED RELEASE ORAL 2 TIMES DAILY
Qty: 60 TABLET | Refills: 3 | Status: SHIPPED | OUTPATIENT
Start: 2018-08-20 | End: 2019-06-24

## 2018-08-20 RX ORDER — ONDANSETRON 4 MG/1
4 TABLET, ORALLY DISINTEGRATING ORAL EVERY 30 MIN PRN
Status: DISCONTINUED | OUTPATIENT
Start: 2018-08-20 | End: 2018-08-20 | Stop reason: HOSPADM

## 2018-08-20 RX ADMIN — SODIUM CHLORIDE, POTASSIUM CHLORIDE, SODIUM LACTATE AND CALCIUM CHLORIDE: 600; 310; 30; 20 INJECTION, SOLUTION INTRAVENOUS at 12:52

## 2018-08-20 RX ADMIN — PROPOFOL 100 MG: 10 INJECTION, EMULSION INTRAVENOUS at 13:07

## 2018-08-20 RX ADMIN — ROCURONIUM BROMIDE 20 MG: 10 INJECTION INTRAVENOUS at 13:16

## 2018-08-20 RX ADMIN — SUGAMMADEX 150 MG: 100 INJECTION, SOLUTION INTRAVENOUS at 13:30

## 2018-08-20 RX ADMIN — FENTANYL CITRATE 50 MCG: 50 INJECTION, SOLUTION INTRAMUSCULAR; INTRAVENOUS at 13:07

## 2018-08-20 RX ADMIN — ROCURONIUM BROMIDE 30 MG: 10 INJECTION INTRAVENOUS at 13:07

## 2018-08-20 RX ADMIN — EPINEPHRINE: 1 INJECTION PARENTERAL at 13:30

## 2018-08-20 RX ADMIN — MIDAZOLAM 2 MG: 1 INJECTION INTRAMUSCULAR; INTRAVENOUS at 12:52

## 2018-08-20 RX ADMIN — ONDANSETRON 4 MG: 2 INJECTION INTRAMUSCULAR; INTRAVENOUS at 13:21

## 2018-08-20 RX ADMIN — SODIUM CHLORIDE: 9 INJECTION, SOLUTION INTRAVENOUS at 09:51

## 2018-08-20 RX ADMIN — PROPOFOL 150 MCG/KG/MIN: 10 INJECTION, EMULSION INTRAVENOUS at 13:10

## 2018-08-20 RX ADMIN — LIDOCAINE HYDROCHLORIDE 70 MG: 20 INJECTION, SOLUTION INFILTRATION; PERINEURAL at 13:07

## 2018-08-20 ASSESSMENT — PAIN SCALES - GENERAL: PAINLEVEL: NO PAIN (0)

## 2018-08-20 NOTE — ANESTHESIA PREPROCEDURE EVALUATION
Anesthesia Evaluation     . Pt has had prior anesthetic.     History of anesthetic complications   - PONV        ROS/MED HX    ENT/Pulmonary: Comment: ILD s/p B lung txp      Neurologic:       Cardiovascular: Comment: Hx of ECMO de cannulated after tx    Recent Echo now with normal EF        (+) hypertension----. : . . . :. . pulmonary hypertension (2/2 ILD (now s/p B lung txp)),       METS/Exercise Tolerance:  >4 METS   Hematologic:     (+) History of blood clots (IJ blood clot on coumadin, transitioned to lovenox for procedure today) -      Musculoskeletal:         GI/Hepatic:         Renal/Genitourinary:         Endo:         Psychiatric:         Infectious Disease:         Malignancy:         Other: Comment: Rheumatoid arthritis; Arron's                    Physical Exam  Normal systems: cardiovascular, pulmonary and dental    Airway   Mallampati: II  TM distance: >3 FB  Neck ROM: full    Dental     Cardiovascular   Rhythm and rate: regular and normal      Pulmonary    breath sounds clear to auscultation                    Anesthesia Plan      History & Physical Review  History and physical reviewed and following examination; no interval change.    ASA Status:  3 .    NPO Status:  > 8 hours    Plan for General and ETT with Intravenous induction. Maintenance will be TIVA.    PONV prophylaxis:  Ondansetron (or other 5HT-3) and Dexamethasone or Solumedrol       Postoperative Care  Postoperative pain management:  IV analgesics.      Consents  Anesthetic plan, risks, benefits and alternatives discussed with:  Patient..                          .

## 2018-08-20 NOTE — ANESTHESIA POSTPROCEDURE EVALUATION
Patient: Kecia Blue    Procedure(s):  Flexible Bronchoscopy, Balloon Dilation - Wound Class: II-Clean Contaminated    Diagnosis:Bronchial Stenosis   Diagnosis Additional Information: No value filed.    Anesthesia Type:  General, ETT    Note:  Anesthesia Post Evaluation    Patient location during evaluation: PACU  Patient participation: Able to fully participate in evaluation  Level of consciousness: awake and alert  Pain management: adequate  Airway patency: patent  Cardiovascular status: acceptable  Respiratory status: acceptable  Hydration status: acceptable  PONV: none     Anesthetic complications: None          Last vitals:  Vitals:    08/20/18 0904 08/20/18 1339 08/20/18 1350   BP: (!) 136/91 112/72 114/80   Resp: 16 16 16   Temp: 36.5  C (97.7  F) 36.7  C (98.1  F) 36.8  C (98.2  F)   SpO2: 98% 99% 98%         Electronically Signed By: Zina Hagan MD  August 20, 2018  2:01 PM

## 2018-08-20 NOTE — PATIENT INSTRUCTIONS
PATIENT INSTRUCTIONS:    1. Okay to stop nystatin on 9/1/18.  2. Call if you feel simethicone (gas tablets) would be helpful.  3. Increase pantoprazole to 40 mg (1 tablet) twice/day.   4. Ask Dr. Lin about pentoxyfilline.  5. We will give you 2 L of fluid at some point today.   6. Make appointment to see GI (Gastoroenterology)  7. I will talk to Willy/Dr. Hale about the Bactrim/appointment with ID.   8. We will check labs (BMP/Cr) twice/day X 2 weeks. You can do fluids close to home if we need to.     Other:   1. Continue to hydrate with 60-70 oz fluids daily.  2. Continue to exercise daily or most days of the week.  3. Follow up with your primary care provider for annual gender health maintenance procedures.  4. Follow up with colonoscopy schedule.  5. Follow up with annual dermatology visits.    Thoracic Transplant Office phone [phone], fax [phone]  Office Hours 8:30 - 5:00     For after-hours urgent issues, please dial [phone], option 4 and ask to speak with the Thoracic Transplant Coordinator  On-Call, pager [phone].  --------------------  To expedite your medication refill(s), please contact your pharmacy and have them fax a refill request to: [phone]  .   *Please allow 3 business days for routine medication refills.  *Please allow 5 business days for controlled substance medication refills.    **For Diabetic medications and supplies refill(s), please contact your pharmacy and have them  Contact your Endocrine team.  --------------------  For scheduling appointments call [facility] :  [phone]. For lab appointments call [phone] or [facility].  --------------------  Please Note: If you are active on [employer], all future test results will be sent by [employer] message only, and will no longer be called to patient. You may also receive communication directly from your physician.

## 2018-08-20 NOTE — TELEPHONE ENCOUNTER
Prior Authorization Retail Medication Request    Medication/Dose: Pantoprazole 40mg  ICD code (if different than what is on RX):  K21.9  GERD  Previously Tried and Failed:  Pantoprazole 20mg , Pantoprazole 40mg daily  Rationale:  GERD can be a risk factor for the development or progression of chronic rejection after lung transplantation.  Break through gastric acid can be damaging to the transplanted lung/lungs . Changing medications can also be detrimental to the patient.    Insurance Name:    Insurance ID:        Pharmacy Information (if different than what is on RX)  Name:  [facility]  Phone:  [phone]

## 2018-08-20 NOTE — NURSING NOTE
CT abd/pelvis scheduled in hospital imaging at 2:40pm today.  Patient is to be NPO x 2 hours.  Report to [facility] waiting area.   notified.  3C pod RN notified.

## 2018-08-20 NOTE — MR AVS SNAPSHOT
After Visit Summary   8/20/2018    Kecia Blue    MRN: [id number]           Patient Information     Date Of Birth          1960        Visit Information        Provider Department      8/20/2018 7:00 AM Ame Chow PA-C [facility]        Today's Diagnoses     Gastroesophageal reflux disease without esophagitis          Care Instructions    PATIENT INSTRUCTIONS:    1. Okay to stop nystatin on 9/1/18.  2. Call if you feel simethicone (gas tablets) would be helpful.  3. Increase pantoprazole to 40 mg (1 tablet) twice/day.   4. Ask Dr. Lin about pentoxyfilline.  5. We will give you 2 L of fluid at some point today.   6. Make appointment to see GI (Gastoroenterology)  7. I will talk to Willy/Dr. Hale about the Bactrim/appointment with ID.     Other:   1. Continue to hydrate with 60-70 oz fluids daily.  2. Continue to exercise daily or most days of the week.  3. Follow up with your primary care provider for annual gender health maintenance procedures.  4. Follow up with colonoscopy schedule.  5. Follow up with annual dermatology visits.    Thoracic Transplant Office phone [phone], fax [phone]  Office Hours 8:30 - 5:00     For after-hours urgent issues, please dial [phone], option [phone] and ask to speak with the Thoracic Transplant Coordinator  On-Call, pager [phone].  --------------------  To expedite your medication refill(s), please contact your pharmacy and have them fax a refill request to: [phone]  .   *Please allow 3 business days for routine medication refills.  *Please allow 5 business days for controlled substance medication refills.    **For Diabetic medications and supplies refill(s), please contact your pharmacy and have them  Contact your Endocrine team.  --------------------  For scheduling appointments call [facility] :  [phone]. For lab appointments call [phone] or  [facility].  --------------------  Please Note: If you are active on eZono, all future test results will be sent by eZono message only, and will no longer be called to patient. You may also receive communication directly from your physician.            Follow-ups after your visit        Additional Services     GASTROENTEROLOGY ADULT REF CONSULT ONLY       Preferred Location: [facility], h/o bilateral lung transplant, ongoing nausea, dry heaves, improves with food, possible GERD      Please be aware that coverage of these services is subject to the terms and limitations of your health insurance plan.  Call member services at your health plan with any benefit or coverage questions.  Any procedures must be performed at a [facility] OR coordinated by your clinic's referral office.    Please bring the following with you to your appointment:    (1) Any X-Rays, CTs or MRIs which have been performed.  Contact the facility where they were done to arrange for  prior to your scheduled appointment.    (2) List of current medications   (3) This referral request   (4) Any documents/labs given to you for this referral                  Follow-up notes from your care team     Return Please schedule GI consult/referral.      Your next 10 appointments already scheduled     Aug 20, 2018   Procedure with Wilber Lin MD   [facility], [place], Same Day Surgery (--)    [address] [facility] [id number] [phone]            Aug 22, 2018  2:00 PM CDT   (Arrive by 1:45 PM)   New Patient Visit with Audra Snyder DO   [facility] and Infectious Diseases ([facility])    [address]  [address]  [facility] [address] [phone]              Who to contact     If you have questions or need follow up information about today's clinic visit or your schedule please contact [facility] directly at [phone].  Normal or non-critical lab and imaging  results will be communicated to you by ID90Thart, letter or phone within 4 business days after the clinic has received the results. If you do not hear from us within 7 days, please contact the clinic through [employer] or phone. If you have a critical or abnormal lab result, we will notify you by phone as soon as possible.  Submit refill requests through [employer] or call your pharmacy and they will forward the refill request to us. Please allow 3 business days for your refill to be completed.          Additional Information About Your Visit        [employer] Information     [employer] gives you secure access to your electronic health record. If you see a primary care provider, you can also send messages to your care team and make appointments. If you have questions, please call your primary care clinic.  If you do not have a primary care provider, please call [phone] and they will assist you.        Care EveryWhere ID     This is your Care EveryWhere ID. This could be used by other organizations to access your [place] medical records  [id number]        Your Vitals Were     Pulse Respirations Last Period Pulse Oximetry BMI (Body Mass Index)       100 16 06/07/2014 (Exact Date) 100% 20.13 kg/m2        Blood Pressure from Last 3 Encounters:   08/20/18 139/73   08/04/18 127/72   08/01/18 132/68    Weight from Last 3 Encounters:   08/20/18 53.2 kg (117 lb 4.8 oz)   08/04/18 56.4 kg (124 lb 6.4 oz)   07/10/18 53.7 kg (118 lb 6.2 oz)              We Performed the Following     GASTROENTEROLOGY ADULT REF CONSULT ONLY          Today's Medication Changes          These changes are accurate as of 8/20/18  7:34 AM.  If you have any questions, ask your nurse or doctor.               These medicines have changed or have updated prescriptions.        Dose/Directions    pantoprazole 40 MG EC tablet   Commonly known as:  PROTONIX   This may have changed:  when to take this   Used for:  Gastroesophageal reflux disease without esophagitis    Changed by:  Ame Chow PA-C        Dose:  40 mg   Take 1 tablet (40 mg) by mouth 2 times daily   Quantity:  60 tablet   Refills:  3            Where to get your medicines      These medications were sent to [facility] [address] [facility] - [place], [place] - [address]  [address] [place] [address]     Phone:  [phone]     pantoprazole 40 MG EC tablet                Primary Care Provider Office Phone # Fax #    Rosy Vu [phone]       [facility]  [address]  [facility] [address]        Equal Access to Services     [facility] CenterBRODERICK : Hadii patricia Lucas, waaxda rigo, qaybta kaalmada татьяна, morro daily . So [facility] [phone].    ATENCIÓN: Si habla español, tiene a taylor disposición servicios gratuitos de asistencia lingüística. Llame al [phone].    We comply with applicable federal civil rights laws and [place] laws. We do not discriminate on the basis of race, color, national origin, age, disability, sex, sexual orientation, or gender identity.            Thank you!     Thank you for choosing [facility]  for your care. Our goal is always to provide you with excellent care. Hearing back from our patients is one way we can continue to improve our services. Please take a few minutes to complete the written survey that you may receive in the mail after your visit with us. Thank you!             Your Updated Medication List - Protect others around you: Learn how to safely use, store and throw away your medicines at www.disposemymeds.org.          This list is accurate as of 8/20/18  7:34 AM.  Always use your most recent med list.                   Brand Name Dispense Instructions for use Diagnosis    ACETAMINOPHEN PO      Take 1,000 mg by mouth every 6 hours as needed for pain        albuterol 108 (90 Base) MCG/ACT inhaler    PROAIR HFA/PROVENTIL HFA/VENTOLIN HFA    1 Inhaler     Inhale 2 puffs into the lungs every 6 hours as needed for shortness of breath / dyspnea or wheezing    SOB (shortness of breath), Wheezing       calcium-vitamin D 600-400 MG-UNIT per tablet    CALTRATE     Take 1 tablet by mouth daily    S/P lung transplant (H), ILD (interstitial lung disease) (H), Lung transplant recipient (H), Encounter for long-term (current) use of high-risk medication, Anemia, unspecified type, Cytomegalovirus (CMV) viremia (H)       enoxaparin 60 MG/0.6ML injection    LOVENOX    12 Syringe    Inject 0.6 mLs (60 mg) Subcutaneous every 12 hours    Deep vein thrombosis (DVT) of upper extremity, unspecified chronicity, unspecified laterality, unspecified vein (H)       levalbuterol 45 MCG/ACT Inhaler    XOPENEX HFA    1 Inhaler    Inhale 2 puffs into the lungs every 6 hours as needed for shortness of breath / dyspnea or wheezing    S/P lung transplant (H), Other constipation       magnesium oxide 400 MG tablet    MAG-OX    90 tablet    Take 1 tablet (400 mg) by mouth daily    Hypomagnesemia       metoprolol tartrate 25 MG tablet    LOPRESSOR    60 tablet    Take 1 tablet (25 mg) by mouth 2 times daily    Paroxysmal atrial fibrillation (H)       multivitamin, therapeutic with minerals Tabs tablet     30 each    Take 1 tablet by mouth daily    Lung transplant recipient (H)       mycophenolate 250 MG capsule    GENERIC EQUIVALENT    360 capsule    Take 6 capsules (1,500 mg) by mouth 2 times daily    S/P lung transplant (H)       nystatin 505425 UNIT/ML suspension    MYCOSTATIN    1200 mL    Take 10 mLs (1,000,000 Units) by mouth 4 times daily    Lung transplant recipient (H)       pantoprazole 40 MG EC tablet    PROTONIX    60 tablet    Take 1 tablet (40 mg) by mouth 2 times daily    Gastroesophageal reflux disease without esophagitis       pentoxifylline 400 MG CR tablet    TRENtal    60 tablet    Take 1 tablet (400 mg) by mouth 2 times daily    Lung transplant recipient (H)       * predniSONE  2.5 MG tablet    DELTASONE    90 tablet    Take 1 tablet (2.5 mg) by mouth daily 6/22/18             7.5                    7.5 7/20/18             7.5                    5 8/24/18             5                       5 9/21/18             5                       2.5    Lung replaced by transplant (H)       * predniSONE 5 MG tablet    DELTASONE    180 tablet    Follow taper card    S/P lung transplant (H)       sulfamethoxazole-trimethoprim 800-160 MG per tablet    BACTRIM DS/SEPTRA DS    30 tablet    Take 1 tablet by mouth daily X one month.    Actinomyces infection, Lung replaced by transplant (H)       * tacrolimus 1 MG capsule    GENERIC EQUIVALENT    330 capsule    Take 5 mg in the AM and 6 mg in the PM along with one 0.5mg in the am for total dose 5.5mg am and 6mg pm.    S/P lung transplant (H), Other constipation       * tacrolimus 0.5 MG capsule    GENERIC EQUIVALENT    30 capsule    Take 1 capsule (0.5 mg) by mouth every morning Along with five 1mg capsules for total dose of 5.5mg am and 6mg pm.    S/P lung transplant (H)       VALCYTE 450 MG tablet   Generic drug:  valGANciclovir     30 tablet    Take 2 tablets (900 mg) by mouth daily    Cytomegalovirus (CMV) viremia (H), ILD (interstitial lung disease) (H), Lung transplant recipient (H), Encounter for long-term (current) use of high-risk medication, Anemia, unspecified type, S/P lung transplant (H)       warfarin 1 MG tablet    COUMADIN    140 tablet    Take 4mg MWFSat and 6mg TuThSun, or as directed by the [facility] at the [facility].    Status post lung transplantation (H), DVT (deep venous thrombosis) (H)       * Notice:  This list has 4 medication(s) that are the same as other medications prescribed for you. Read the directions carefully, and ask your doctor or other care provider to review them with you.

## 2018-08-20 NOTE — DISCHARGE INSTRUCTIONS
Post Bronchoscopy Patient Instructions:    2018  Kecia Blue    Your procedure completed (bronchoscopy with ballon dilation) without any immediate complications.  You may cough up scant amount of blood for the next 12 hours. If you have excessive cough with blood, chest pain, shortness of breath, please report to the closest emergency room.    You may experience low grade (less than 100.5 F) fever next 24 hours, if so you can take tylenol. If the fever persists more than 24 hours contact to our office or your primary care provider.    Our office (Thoracic/Pulmonary--[phone]) will call you to schedule next procedure in 2-3 weeks    Should you have any question, please do not hesitate to call our office.    DA Lin MD    [facility]  Same-Day Surgery   Adult Discharge Orders & Instructions     For 24 hours after surgery    1. Get plenty of rest.  A responsible adult must stay with you for at least 24 hours after you leave the hospital.   2. Do not drive or use heavy equipment.  If you have weakness or tingling, don't drive or use heavy equipment until this feeling goes away.  3. Do not drink alcohol.  4. Avoid strenuous or risky activities.  Ask for help when climbing stairs.   5. You may feel lightheaded.  IF so, sit for a few minutes before standing.  Have someone help you get up.   6. If you have nausea (feel sick to your stomach): Drink only clear liquids such as apple juice, ginger ale, broth or 7-Up.  Rest may also help.  Be sure to drink enough fluids.  Move to a regular diet as you feel able.  7. You may have a slight fever. Call the doctor if your fever is over 100 F (37.7 C) (taken under the tongue) or lasts longer than 24 hours.  8. You may have a dry mouth, a sore throat, muscle aches or trouble sleeping.  These should go away after 24 hours.  9. Do not make important or legal decisions.   Call your doctor for any of the followin.   Signs of infection (fever, growing tenderness at the surgery site, a large amount of drainage or bleeding, severe pain, foul-smelling drainage, redness, swelling).    2. It has been over 8 to 10 hours since surgery and you are still not able to urinate (pass water).    3.  Headache for over 24 hours.      To contact a doctor, call PEDRO Lin at the Thoracic/Pulmonary Clinic--[phone] or:        [phone] and ask for the resident on call for Pulmonary (answered 24 hours a day)      Emergency Department:    [facility]: [phone]       (TTY for hearing impaired: [phone])

## 2018-08-20 NOTE — PROGRESS NOTES
ANTICOAGULATION FOLLOW-UP CLINIC VISIT    Patient Name:  Kecia Blue  Date:  8/20/2018  Contact Type:  Telephone    SUBJECTIVE:     Patient Findings     Comments Pt reports she was instructed by the MD doing the bronch that she can restart her Warfarin and Lovenox 60mg Q 12 Hours tonight. Pt also reports she might have to schedule another bronch in two weeks and we will have to follow previous bronch plan 6/6/18.           OBJECTIVE    INR   Date Value Ref Range Status   08/20/2018 1.03 0.86 - 1.14 Final       ASSESSMENT / PLAN  INR assessment SUB    Recheck INR In: 3 DAYS    INR Location Clinic      Anticoagulation Summary as of 8/20/2018     INR goal 2.0-3.0   Today's INR 1.03!   Warfarin maintenance plan No maintenance plan   Full warfarin instructions 8/20: 6 mg; 8/21: 6 mg; 8/22: 6 mg; Otherwise No maintenance plan   Next INR check 8/23/2018   Priority INR   Target end date     Indications   Lung transplant recipient (H) [Z94.2]  Deep vein thrombosis (DVT) (H) [I82.409] [I82.409]         Anticoagulation Episode Summary     INR check location Clinic Lab    Preferred lab     Send INR reminders to [facility]    Comments Extended duration orders until October per Magaly Castro RN- Provoked DVTHIPPA OK and  Verbal OK to speak with spouse and leave msg @ [phone].  labs @ [facility] starting ~ 5/25/18.  fax: [phone]. ph:[phone] BRONCH PLAN NOTE 6/6/18      Anticoagulation Care Providers     Provider Role Specialty Phone number    Ame Chow PA-C Responsible Pulmonary [phone]            See the Encounter Report to view Anticoagulation Flowsheet and Dosing Calendar (Go to Encounters tab in chart review, and find the Anticoagulation Therapy Visit)    Spoke with patient. Gave them their lab results and new warfarin recommendation.  No changes in health, medication, or diet. No missed doses, no falls. No signs or symptoms of bleed or clotting.     Ofe Redmond RN

## 2018-08-20 NOTE — LETTER
8/20/2018       RE: Kecia Blue  [address] [place] [address]     Dear Colleague,    Thank you for referring your patient, Kecia Blue, to the [facility] at [facility]. Please see a copy of my visit note below.    [facility]  August 20, 2018         Assessment and Plan:   Mrs. Kecia Blue is a 56 y/o female with h/o dermatomyositis, seronegative RA, ILD and pulmonary hypertension s/p bilateral lung transplant on 3/1/18. Post operative course complicated by right main bronchial stenosis, positive DSAs, CMV viremia and recent admissipon 8/1-8/4 for FTT and diarrhea secondary to C.difficile. She is seen today for post discharge follow up and pre operative clearance.     Coordinator/MD: CANDY/AL    1. C diff colitis: per PCR on 8/2. Notes her stools are very slowly improving, less loose, but still about 3 /day. Completed 10 day course of oral vancomycin.      2. CMV viremia: last serum CMV of 6171 on 8/1/18. Resistance panel with pansensitivity.   - CMV pending from today  - Continue valcyte 900 mg daily    3. Oral lesions concerning for HSV: on left lower lip area. Appear consistent with HSV, although serum and skin PCR negative. Nearly resolved.        4. S/p bilateral lung transplant: feels dyspneic with minimal activity, notes increased non productive cough, tightness stable. Sating 100% on room air. CXR reviewed by me today demonstrates stable transplant with small left effusion. PFTs improved from prior, FEV1 increased 4% to hear her post transplant best. Scheduled for bronch in OR today  - Continue immunosuppresion including mycophenolate 1500 mg BID, tacrolimus (goal 10-15) and prednisone taper   - Current prophylaxis with Valcyte 900 mg daily (CMV D+/R+) and nystatin (through 9/1)  - On ekaterina     5. Right main bronchial stenosis s/p dilatation: most  recently on 8/1. BAL + for actinomyces and gardnerella vaginalis and per discussion with coordinator, patient is supposed to be on 1 DS Bactrim daily with f/u with ID.  - Continue pentoxyfilline and Bactrim 1 DS tablet daily (for a total duration of one month from 8/2)  - Okay for bronch in OR later today    6. RADHA with Cr up to 1.37 today, from .99 at discharge. Likely pre renal from ongoing loose stools.  - F/u on tacrolimus level  - Will give 2 L IVF today during her bronch  - BMP twice weekly for the next few weeks, IVF near home PRN    7. Nausea and early satiety: not much improved since discharge, feels it's worsened with the Bactrim. Wants to eat, but gets full quickly. No epigastric pain, but notes increased cough and rhinorrhea. CXR demonstrates ongoing dilated loops of bowel, stable from prior, but not evaluated during last admission.  - Increase pantoprazole to 40 mg BID  - CT abdomen/pelvis  - Gastroenterology referral     8. Positive DSA: from 8/1 with DQB7 (down to 800 from 1576)   - Ordered DSA at next f/u     9. Anemia: with hgb of 7.6 down from baseline 8-9s. No s/s of bleeding. Received 1 U PRBC during admission, suspect possible occult bleeding from C diff. Hgb is stable at 7.5. Iron panel, folate/B12 WNL.  - Monitor      10. Provoked LUE DVT: on ultrasound on 3/7 demonstrating occlusive thrombus in the left subclavian vein, axillary vein, basilic vein in the upper arm, cephalic vein near junction with the subclavian vein and cephalic vein from mid arm to the wrist. Repeat US on 4/16 with decreased subclavian thrombus and resolved axillary thrombus, but new (vs previously obscured) right IJ clot. On warfarin, goal INR 2-3.   - Coumadin on hold for bronch  - Will need at least 6 months of AC (mid Oct)  - US prior to discontinuation      11. High risk donor: will need 12 months labs.       12. HTN: BP has been stable.   - Continue metoprolol 25 mg BID     13.  Left knee pain: with effusion following  last day of rehab on the leg press. S/p steroid injection with slight improvement in pain, but still has a lot of joint instability, improving.   - PT/OT to assess in the fall     Chronic issus:  1. Dermatomyositis    RTC: pending bronch results  Influenza and other vaccinations:   Annual dermatology visit:    Ame Chow PA-C  Pulmonary, Allergy, Critical Care and Sleep Medicine        Interval History:   Overall, diarrhea is improving, still soft around 2/day. Nausea has improved, mainly in the am before pills, gets better with eating. No vomiting, but does endorse dry heaving. No abdominal pain or cramps. Notes shortness of breath with activity, but notes her O2 is 98-99. No shortness of breath with rest, coughing a lot, productive of slight amounts of spit. Wheezing has improved, no tightness. No fever or chills. No night sweats. Appetite is still poor, doesn't take much to fill her up. Sleeping fine.           Review of Systems:   Please see HPI, otherwise the complete 10 point ROS is negative.           Past Medical and Surgical History:     Past Medical History:   Diagnosis Date     Antisynthetase syndrome (H) 2014     Chronic cough      Dehydration 8/1/2018     Dermatomyositis (H)      Dysplasia of cervix, low grade (ESTRADA 1)      ILD (interstitial lung disease) (H)      Osteopenia      PONV (postoperative nausea and vomiting)      Pulmonary hypertension      Raynaud's disease      Seronegative rheumatoid arthritis (H)      Past Surgical History:   Procedure Laterality Date     BRONCHOSCOPY (RIGID OR FLEXIBLE), DIAGNOSTIC N/A 4/10/2018    Procedure: COMBINED BRONCHOSCOPY (RIGID OR FLEXIBLE), LAVAGE;;  Surgeon: Mariposa Donohue MD;  Location:  GI     BRONCHOSCOPY FLEXIBLE N/A 3/13/2018    Procedure: BRONCHOSCOPY FLEXIBLE;  Flexible Bronchoscopy ;  Surgeon: Gissell Sanchez MD;  Location:  GI     BRONCHOSCOPY FLEXIBLE N/A 5/9/2018    Procedure: BRONCHOSCOPY FLEXIBLE;;  Surgeon: Wilber Lin  MD Alana;  Location: [facility]     BRONCHOSCOPY RIGID N/A 6/6/2018    Procedure: BRONCHOSCOPY RIGID;;  Surgeon: Lopez Macias MD;  Location: [facility]     BRONCHOSCOPY, DILATE BRONCHUS, STENT BRONCHUS, COMBINED N/A 6/11/2018    Procedure: COMBINED BRONCHOSCOPY, DILATE BRONCHUS, STENT BRONCHUS;  Flexible Bronchoscopy, Balloon Dilation, Bronchial Washings;  Surgeon: Wilber Lin MD;  [facility]     BRONCHOSCOPY, DILATE BRONCHUS, STENT BRONCHUS, COMBINED Right 7/10/2018    Procedure: COMBINED BRONCHOSCOPY, DILATE BRONCHUS, STENT BRONCHUS;  Flexible Bronchoscopy, Balloon Dilation, Bronchial Washings  ;  Surgeon: Wilber Lin MD;  Location: [facility] OR     BRONCHOSCOPY, DILATE BRONCHUS, STENT BRONCHUS, COMBINED N/A 8/2/2018    Procedure: COMBINED BRONCHOSCOPY, DILATE BRONCHUS, STENT BRONCHUS;  Flexible Bronchoscopy, Bronchial Washings, Balloon Dilation;  Surgeon: Wilber Lin MD;  Location:  OR     ENT SURGERY      tonsillectomy as a child     INSERT EXTRACORPORAL MEMBRANE OXYGENATOR ADULT (OUTSIDE OR) N/A 2/27/2018    Procedure: INSERT EXTRACORPORAL MEMBRANE OXYGENATOR ADULT (OUTSIDE OR);  INSERT EXTRACORPORAL MEMBRANE OXYGENATOR ADULT (OUTSIDE OR) ;  Surgeon: Toby Hernandez MD;  Location:  OR     no prior surgery       REMOVE EXTRACORPORAL MEMBRANE OXYGENATOR ADULT N/A 3/3/2018    Procedure: REMOVE EXTRACORPORAL MEMBRANE OXYGENATOR ADULT;  Removal of Right Femoral Venous and Right Axillary Arterial Extracorporeal Membrane Oxygenator;  Surgeon: Toby Hernandez MD;  Location:  OR     TRANSPLANT LUNG RECIPIENT SINGLE X2 Bilateral 3/1/2018    Procedure: TRANSPLANT LUNG RECIPIENT SINGLE X2;  Median Sternotomy, Extracorporeal Membrane Oxygenator, bilateral sequential lung transplant;  Surgeon: Toby Hernandez MD;  Location:  OR           Family History:     Family History   Problem Relation Age of Onset     Hypertension Mother      Arthritis Mother       Pancreatic Cancer Father      Diabetes Father             Social History:     Social History     Social History     Marital status:      Spouse name: N/A     Number of children: N/A     Years of education: N/A     Occupational History     Not on file.     Social History Main Topics     Smoking status: Never Smoker     Smokeless tobacco: Never Used     Alcohol use No     Drug use: No     Sexual activity: Not on file     Other Topics Concern     Parent/Sibling W/ Cabg, Mi Or Angioplasty Before 65f 55m? No     Social History Narrative            Medications:     No current facility-administered medications for this visit.      No current outpatient prescriptions on file.     Facility-Administered Medications Ordered in Other Visits   Medication     lactated ringers infusion     lidocaine (LMX4) cream     lidocaine 1 % 1 mL     PRE OP antibiotics NOT needed for this surgical procedure.     sodium chloride (PF) 0.9% PF flush 3 mL     sodium chloride (PF) 0.9% PF flush 3 mL     sodium chloride 0.9% infusion            Physical Exam:   /73 (BP Location: Right arm, Patient Position: Chair, Cuff Size: Adult Regular)  Pulse 100  Resp 16  Wt 53.2 kg (117 lb 4.8 oz)  LMP 06/07/2014 (Exact Date)  SpO2 100%  BMI 20.13 kg/m2    GENERAL: alert, NAD  HEENT: NCAT, EOMI, no scleral icterus, oral mucosa moist and without lesions  Neck: no cervical or supraclavicular adenopathy  Lungs: moderate airflow, few scattered rhonchi  CV: RRR, S1S2, no murmurs noted  Abdomen: normoactive BS, soft, non tender   Lymph: no edema  Neuro: AAO X 3, CN 2-12 grossly intact  Psychiatric: normal affect, good eye contact  Skin: no rash, jaundice or lesions on limited exam         Data:   All laboratory and imaging data reviewed.      Recent Results (from the past 168 hour(s))   INR    Collection Time: 08/17/18 12:00 AM   Result Value Ref Range    INR 1.7    Basic metabolic panel    Collection Time: 08/20/18  5:52 AM   Result Value  Ref Range    Sodium 134 133 - 144 mmol/L    Potassium 4.2 3.4 - 5.3 mmol/L    Chloride 100 94 - 109 mmol/L    Carbon Dioxide 25 20 - 32 mmol/L    Anion Gap 9 3 - 14 mmol/L    Glucose 90 70 - 99 mg/dL    Urea Nitrogen 24 7 - 30 mg/dL    Creatinine 1.37 (H) 0.52 - 1.04 mg/dL    GFR Estimate 40 (L) >60 mL/min/1.7m2    GFR Estimate If Black 48 (L) >60 mL/min/1.7m2    Calcium 9.3 8.5 - 10.1 mg/dL   Magnesium    Collection Time: 08/20/18  5:52 AM   Result Value Ref Range    Magnesium 1.6 1.6 - 2.3 mg/dL   CBC with platelets    Collection Time: 08/20/18  5:52 AM   Result Value Ref Range    WBC 8.2 4.0 - 11.0 10e9/L    RBC Count 2.45 (L) 3.8 - 5.2 10e12/L    Hemoglobin 7.5 (L) 11.7 - 15.7 g/dL    Hematocrit 25.2 (L) 35.0 - 47.0 %     (H) 78 - 100 fl    MCH 30.6 26.5 - 33.0 pg    MCHC 29.8 (L) 31.5 - 36.5 g/dL    RDW 16.5 (H) 10.0 - 15.0 %    Platelet Count 587 (H) 150 - 450 10e9/L   Folate    Collection Time: 08/20/18  5:53 AM   Result Value Ref Range    Folate 43.4 >5.4 ng/mL   INR    Collection Time: 08/20/18  5:53 AM   Result Value Ref Range    INR 1.03 0.86 - 1.14   General PFT Lab (Please always keep checked)    Collection Time: 08/20/18  6:22 AM   Result Value Ref Range    FVC-Pred 3.24 L    FVC-Pre 1.83 L    FVC-%Pred-Pre 56 %    FEV1-Pre 1.22 L    FEV1-%Pred-Pre 47 %    FEV1FVC-Pred 79 %    FEV1FVC-Pre 66 %    FEFMax-Pred 6.43 L/sec    FEFMax-Pre 3.57 L/sec    FEFMax-%Pred-Pre 55 %    FEF2575-Pred 2.44 L/sec    FEF2575-Pre 0.69 L/sec    SEC0875-%Pred-Pre 28 %    ExpTime-Pre 7.03 sec    FIFMax-Pre 2.64 L/sec    FEV1FEV6-Pred 81 %    FEV1FEV6-Pre 66 %     PFT interpretation:  Maneuver: valid and meets ATS guidelines  Severe obstruction with decreased FEV1 and FEV1/FVC  Compared to prior: FEV1 of 1.22 is 110 ml above prior  The decrease in FVC may represent air trapping v. restrictive physiology.  Lung volumes would be necessary to determine.    Again, thank you for allowing me to participate in the care of your  patient.      Sincerely,    Ame Chow PA-C

## 2018-08-20 NOTE — TELEPHONE ENCOUNTER
Central Prior Authorization Team   Phone: [phone]      PA Initiation    Medication: Pantoprazole 40mg-PA Initiated  Insurance Company: [employer] - Phone [phone] Fax [phone]  Pharmacy Filling the Rx: [facility] [id number] [place] [address]  Filling Pharmacy Phone: [phone]  Filling Pharmacy Fax: [phone]  Start Date: 8/20/2018

## 2018-08-20 NOTE — OR NURSING
Normal Saline 0.9NS @500ml/hr started in pre-op per Dr. Lin. Goal is for patient to have a total of 2L today for Creatinine of 1.37

## 2018-08-20 NOTE — IP AVS SNAPSHOT
MRN:[id number]                      After Visit Summary   8/20/2018    Kecia Blue    MRN: [id number]           Thank you!     Thank you for choosing [place] for your care. Our goal is always to provide you with excellent care. Hearing back from our patients is one way we can continue to improve our services. Please take a few minutes to complete the written survey that you may receive in the mail after you visit with us. Thank you!        Patient Information     Date Of Birth          1960        About your hospital stay     You were admitted on:  August 20, 2018 You last received care in the:  Same Day Surgery [facility]    You were discharged on:  August 20, 2018       Who to Call     For medical emergencies, please call 911.  For non-urgent questions about your medical care, please call your primary care provider or clinic, [phone]  For questions related to your surgery, please call your surgery clinic        Attending Provider     Provider Michell Lin, Wilber Warner MD Pulmonary       Primary Care Provider Office Phone # Fax #    Rosy Vu [phone]      Your next 10 appointments already scheduled     Aug 22, 2018  2:00 PM CDT   (Arrive by 1:45 PM)   New Patient Visit with DO LEENA Allen [facility] ([facility])    [address] [place] [address]  [address]  [facility] [id number] [phone]            Dec 13, 2018  8:40 AM CST   (Arrive by 8:25 AM)   New Patient Visit with Daron Cruz PA-C   [facility] ([facility])    [address] [place] [address]  4th Floor  [facility] [id number] [phone]              Further instructions from your care team       Post Bronchoscopy Patient Instructions:    August 20, 2018  Kecia R Mirza    Your procedure completed (bronchoscopy with ballon dilation) without any immediate  complications.  You may cough up scant amount of blood for the next 12 hours. If you have excessive cough with blood, chest pain, shortness of breath, please report to the closest emergency room.    You may experience low grade (less than 100.5 F) fever next 24 hours, if so you can take tylenol. If the fever persists more than 24 hours contact to our office or your primary care provider.    Our office (Thoracic/Pulmonary--[phone]) will call you to schedule next procedure in 2-3 weeks    Should you have any question, please do not hesitate to call our office.    DA Lin MD    [facility], [place]  Same-Day Surgery   Adult Discharge Orders & Instructions     For 24 hours after surgery    1. Get plenty of rest.  A responsible adult must stay with you for at least 24 hours after you leave the hospital.   2. Do not drive or use heavy equipment.  If you have weakness or tingling, don't drive or use heavy equipment until this feeling goes away.  3. Do not drink alcohol.  4. Avoid strenuous or risky activities.  Ask for help when climbing stairs.   5. You may feel lightheaded.  IF so, sit for a few minutes before standing.  Have someone help you get up.   6. If you have nausea (feel sick to your stomach): Drink only clear liquids such as apple juice, ginger ale, broth or 7-Up.  Rest may also help.  Be sure to drink enough fluids.  Move to a regular diet as you feel able.  7. You may have a slight fever. Call the doctor if your fever is over 100 F (37.7 C) (taken under the tongue) or lasts longer than 24 hours.  8. You may have a dry mouth, a sore throat, muscle aches or trouble sleeping.  These should go away after 24 hours.  9. Do not make important or legal decisions.   Call your doctor for any of the followin.  Signs of infection (fever, growing tenderness at the surgery site, a large amount of drainage or bleeding, severe pain, foul-smelling drainage, redness,  "swelling).    2. It has been over 8 to 10 hours since surgery and you are still not able to urinate (pass water).    3.  Headache for over 24 hours.      To contact a doctor, call PEDRO Lin at the Thoracic/Pulmonary Clinic--[phone] or:        [phone] and ask for the resident on call for Pulmonary (answered 24 hours a day)      Emergency Department:    [facility]: [phone]       (TTY for hearing impaired: [phone])            Additional Information     If you use hormonal birth control (such as the pill, patch, ring or implants): You'll need a second form of birth control for 7 days (condoms, a diaphragm or contraceptive foam). While in the hospital, you received a medicine called Bridion. Your normal birth control will not work as well for a week after taking this medicine.          Pending Results     Date and Time Order Name Status Description    8/20/2018 0533 CMV DNA QUANTIFICATION In process             Admission Information     Date & Time Provider Department Dept. Phone    8/20/2018 Wilber Lin MD Same Day Surgery [facility] [phone]      Your Vitals Were     Blood Pressure Temperature Respirations Height Weight Last Period    121/77 98.2  F (36.8  C) 16 1.626 m (5' 4\") 53.3 kg (117 lb 8.1 oz) 06/07/2014 (Exact Date)    Pulse Oximetry BMI (Body Mass Index)                96% 20.17 kg/m2          MyChart Information     [email or web address] gives you secure access to your electronic health record. If you see a primary care provider, you can also send messages to your care team and make appointments. If you have questions, please call your primary care clinic.  If you do not have a primary care provider, please call [phone] and they will assist you.        Care EveryWhere ID     This is your Care EveryWhere ID. This could be used by other organizations to access your [facility] medical records  [id number]        Equal Access to Services     ALBAN VALENCIA AH: Sahara orellana " Somachelleali, wadarwinda luqadaha, qaybta kaalmada татьяна, morro munoz dorimarilyn alvaradoeloina allie. So [facility] [phone].    ATENCIÓN: Si margarito silveira, tiene a taylor disposición servicios gratuitos de asistencia lingüística. Zachary al [phone].    We comply with applicable federal civil rights laws and [place] laws. We do not discriminate on the basis of race, color, national origin, age, disability, sex, sexual orientation, or gender identity.               Review of your medicines      CONTINUE these medicines which have NOT CHANGED        Dose / Directions    ACETAMINOPHEN PO        Dose:  1000 mg   Take 1,000 mg by mouth every 6 hours as needed for pain   Refills:  0       albuterol 108 (90 Base) MCG/ACT inhaler   Commonly known as:  PROAIR HFA/PROVENTIL HFA/VENTOLIN HFA   Used for:  SOB (shortness of breath), Wheezing        Dose:  2 puff   Inhale 2 puffs into the lungs every 6 hours as needed for shortness of breath / dyspnea or wheezing   Quantity:  1 Inhaler   Refills:  1       calcium-vitamin D 600-400 MG-UNIT per tablet   Commonly known as:  CALTRATE   Used for:  S/P lung transplant (H), ILD (interstitial lung disease) (H), Lung transplant recipient (H), Encounter for long-term (current) use of high-risk medication, Anemia, unspecified type, Cytomegalovirus (CMV) viremia (H)        Dose:  1 tablet   Take 1 tablet by mouth daily   Refills:  0       enoxaparin 60 MG/0.6ML injection   Commonly known as:  LOVENOX   Used for:  Deep vein thrombosis (DVT) of upper extremity, unspecified chronicity, unspecified laterality, unspecified vein (H)        Dose:  60 mg   Inject 0.6 mLs (60 mg) Subcutaneous every 12 hours   Quantity:  12 Syringe   Refills:  0       levalbuterol 45 MCG/ACT Inhaler   Commonly known as:  XOPENEX HFA   Used for:  S/P lung transplant (H), Other constipation        Dose:  2 puff   Inhale 2 puffs into the lungs every 6 hours as needed for shortness of breath / dyspnea or wheezing   Quantity:  1  Inhaler   Refills:  11       magnesium oxide 400 MG tablet   Commonly known as:  MAG-OX   Used for:  Hypomagnesemia        Dose:  400 mg   Take 1 tablet (400 mg) by mouth daily   Quantity:  90 tablet   Refills:  3       metoprolol tartrate 25 MG tablet   Commonly known as:  LOPRESSOR   Used for:  Paroxysmal atrial fibrillation (H)        Dose:  25 mg   Take 1 tablet (25 mg) by mouth 2 times daily   Quantity:  60 tablet   Refills:  11       multivitamin, therapeutic with minerals Tabs tablet   Used for:  Lung transplant recipient (H)        Dose:  1 tablet   Take 1 tablet by mouth daily   Quantity:  30 each   Refills:  11       mycophenolate 250 MG capsule   Commonly known as:  GENERIC EQUIVALENT   Used for:  S/P lung transplant (H)        Dose:  1500 mg   Take 6 capsules (1,500 mg) by mouth 2 times daily   Quantity:  360 capsule   Refills:  11       nystatin 469968 UNIT/ML suspension   Commonly known as:  MYCOSTATIN   Used for:  Lung transplant recipient (H)        Dose:  10 mL   Take 10 mLs (1,000,000 Units) by mouth 4 times daily   Quantity:  1200 mL   Refills:  5       pantoprazole 40 MG EC tablet   Commonly known as:  PROTONIX   Used for:  Gastroesophageal reflux disease without esophagitis, ILD (interstitial lung disease) (H), Lung transplant recipient (H)        Dose:  40 mg   Take 1 tablet (40 mg) by mouth 2 times daily   Quantity:  60 tablet   Refills:  3       pentoxifylline 400 MG CR tablet   Commonly known as:  TRENtal   Used for:  Lung transplant recipient (H)        Dose:  400 mg   Take 1 tablet (400 mg) by mouth 2 times daily   Quantity:  60 tablet   Refills:  3       * predniSONE 2.5 MG tablet   Commonly known as:  DELTASONE   Used for:  Lung replaced by transplant (H)        Dose:  2.5 mg   Take 1 tablet (2.5 mg) by mouth daily 6/22/18             7.5                    7.5 7/20/18             7.5                    5 8/24/18             5                       5 9/21/18             5                        2.5   Quantity:  90 tablet   Refills:  1       * predniSONE 5 MG tablet   Commonly known as:  DELTASONE   Used for:  S/P lung transplant (H)        Follow taper card   Quantity:  180 tablet   Refills:  3       sulfamethoxazole-trimethoprim 800-160 MG per tablet   Commonly known as:  BACTRIM DS/SEPTRA DS   Used for:  Actinomyces infection, Lung replaced by transplant (H)        Dose:  1 tablet   Take 1 tablet by mouth daily X one month.   Quantity:  30 tablet   Refills:  1       * tacrolimus 1 MG capsule   Commonly known as:  GENERIC EQUIVALENT   Used for:  S/P lung transplant (H), Other constipation        Take 5 mg in the AM and 6 mg in the PM along with one 0.5mg in the am for total dose 5.5mg am and 6mg pm.   Quantity:  330 capsule   Refills:  11       * tacrolimus 0.5 MG capsule   Commonly known as:  GENERIC EQUIVALENT   Used for:  S/P lung transplant (H)        Dose:  0.5 mg   Take 1 capsule (0.5 mg) by mouth every morning Along with five 1mg capsules for total dose of 5.5mg am and 6mg pm.   Quantity:  30 capsule   Refills:  11       VALCYTE 450 MG tablet   Used for:  Cytomegalovirus (CMV) viremia (H), ILD (interstitial lung disease) (H), Lung transplant recipient (H), Encounter for long-term (current) use of high-risk medication, Anemia, unspecified type, S/P lung transplant (H)   Generic drug:  valGANciclovir        Dose:  900 mg   Take 2 tablets (900 mg) by mouth daily   Quantity:  30 tablet   Refills:  6       warfarin 1 MG tablet   Commonly known as:  COUMADIN   Used for:  Status post lung transplantation (H), DVT (deep venous thrombosis) (H)        Take 4mg MWFSat and 6mg TuThSun, or as directed by the [facility] at the [facility]   Quantity:  140 tablet   Refills:  3       * Notice:  This list has 4 medication(s) that are the same as other medications prescribed for you. Read the directions carefully, and ask your doctor or other care provider to review them with you.              Protect others around you: Learn how to safely use, store and throw away your medicines at www.disposemymeds.org.             Medication List: This is a list of all your medications and when to take them. Check marks below indicate your daily home schedule. Keep this list as a reference.      Medications           Morning Afternoon Evening Bedtime As Needed    ACETAMINOPHEN PO   Take 1,000 mg by mouth every 6 hours as needed for pain                                albuterol 108 (90 Base) MCG/ACT inhaler   Commonly known as:  PROAIR HFA/PROVENTIL HFA/VENTOLIN HFA   Inhale 2 puffs into the lungs every 6 hours as needed for shortness of breath / dyspnea or wheezing                                calcium-vitamin D 600-400 MG-UNIT per tablet   Commonly known as:  CALTRATE   Take 1 tablet by mouth daily                                enoxaparin 60 MG/0.6ML injection   Commonly known as:  LOVENOX   Inject 0.6 mLs (60 mg) Subcutaneous every 12 hours                                levalbuterol 45 MCG/ACT Inhaler   Commonly known as:  XOPENEX HFA   Inhale 2 puffs into the lungs every 6 hours as needed for shortness of breath / dyspnea or wheezing                                magnesium oxide 400 MG tablet   Commonly known as:  MAG-OX   Take 1 tablet (400 mg) by mouth daily                                metoprolol tartrate 25 MG tablet   Commonly known as:  LOPRESSOR   Take 1 tablet (25 mg) by mouth 2 times daily                                multivitamin, therapeutic with minerals Tabs tablet   Take 1 tablet by mouth daily                                mycophenolate 250 MG capsule   Commonly known as:  GENERIC EQUIVALENT   Take 6 capsules (1,500 mg) by mouth 2 times daily                                nystatin 258218 UNIT/ML suspension   Commonly known as:  MYCOSTATIN   Take 10 mLs (1,000,000 Units) by mouth 4 times daily                                pantoprazole 40 MG EC tablet   Commonly known as:  PROTONIX   Take 1  tablet (40 mg) by mouth 2 times daily                                pentoxifylline 400 MG CR tablet   Commonly known as:  TRENtal   Take 1 tablet (400 mg) by mouth 2 times daily                                * predniSONE 2.5 MG tablet   Commonly known as:  DELTASONE   Take 1 tablet (2.5 mg) by mouth daily 6/22/18             7.5                    7.5 7/20/18             7.5                    5 8/24/18             5                       5 9/21/18             5                       2.5                                * predniSONE 5 MG tablet   Commonly known as:  DELTASONE   Follow taper card                                sulfamethoxazole-trimethoprim 800-160 MG per tablet   Commonly known as:  BACTRIM DS/SEPTRA DS   Take 1 tablet by mouth daily X one month.                                * tacrolimus 1 MG capsule   Commonly known as:  GENERIC EQUIVALENT   Take 5 mg in the AM and 6 mg in the PM along with one 0.5mg in the am for total dose 5.5mg am and 6mg pm.                                * tacrolimus 0.5 MG capsule   Commonly known as:  GENERIC EQUIVALENT   Take 1 capsule (0.5 mg) by mouth every morning Along with five 1mg capsules for total dose of 5.5mg am and 6mg pm.                                VALCYTE 450 MG tablet   Take 2 tablets (900 mg) by mouth daily   Generic drug:  valGANciclovir                                warfarin 1 MG tablet   Commonly known as:  COUMADIN   Take 4mg MWFSat and 6mg TuThSun, or as directed by the Medication Monitoring [facility] at the [facility].                                * Notice:  This list has 4 medication(s) that are the same as other medications prescribed for you. Read the directions carefully, and ask your doctor or other care provider to review them with you.

## 2018-08-20 NOTE — TELEPHONE ENCOUNTER
Left message for pt reminding them of upcoming appointment.  Instructed pt to bring updated medications list.  Pat Toney MA

## 2018-08-20 NOTE — ANESTHESIA CARE TRANSFER NOTE
Patient: Kecia Blue    Procedure(s):  Flexible Bronchoscopy, Balloon Dilation - Wound Class: II-Clean Contaminated    Diagnosis: Bronchial Stenosis   Diagnosis Additional Information: No value filed.    Anesthesia Type:   General, ETT     Note:  Airway :Face Mask  Patient transferred to:PACU  Comments: Transferred to: PACU with supplemental O2 6L FM    Patient vital signs: stable    Airway: none    Monitors applied. Alert and conversing. Report to RN.        Handoff Report: Identifed the Patient, Identified the Reponsible Provider, Reviewed the pertinent medical history, Discussed the surgical course, Reviewed Intra-OP anesthesia mangement and issues during anesthesia, Set expectations for post-procedure period and Allowed opportunity for questions and acknowledgement of understanding      Vitals: (Last set prior to Anesthesia Care Transfer)    CRNA VITALS  8/20/2018 1306 - 8/20/2018 1342      8/20/2018             Pulse: 98    Ht Rate: (!)  0    SpO2: 97 %    Resp Rate (observed): (!)  2                Electronically Signed By: ADRIÁN Lee CRNA  August 20, 2018  1:42 PM

## 2018-08-20 NOTE — OR NURSING
Call from transplant clinic that team would like patient to get a CT scan of abd/pelvis on hospital encounter today. Discussed with CT scan who is able to take patient now pre-op before 1240 OR time.     Addendum: patient off unit for CT scan at 1130

## 2018-08-20 NOTE — IP AVS SNAPSHOT
Same Day Surgery [address] [address]    Phone:  [phone]                                       After Visit Summary   8/20/2018    Kecia Blue    MRN: [id number]           After Visit Summary Signature Page     I have received my discharge instructions, and my questions have been answered. I have discussed any challenges I see with this plan with the nurse or doctor.    ..........................................................................................................................................  Patient/Patient Representative Signature      ..........................................................................................................................................  Patient Representative Print Name and Relationship to Patient    ..................................................               ................................................  Date                                            Time    ..........................................................................................................................................  Reviewed by Signature/Title    ...................................................              ..............................................  Date                                                            Time

## 2018-08-20 NOTE — PROGRESS NOTES
Armand Mcdonnell, your prograf level was 12.1 at about 10.5 hours on 8/20/18. You are likely within your goal of 9-11 if this were to be a 12 hour level, so no dose change at this time. Please call the transplant office ([phone]) with any questions. Thanks, Mikayla

## 2018-08-20 NOTE — NURSING NOTE
Chief Complaint   Patient presents with     Lung Transplant     patient is being seen for a post lung follow up      Ciara Lopez CMA

## 2018-08-21 ENCOUNTER — TELEPHONE (OUTPATIENT)
Dept: TRANSPLANT | Facility: CLINIC | Age: 58
End: 2018-08-21

## 2018-08-21 DIAGNOSIS — K30 DELAYED GASTRIC EMPTYING: Primary | ICD-10-CM

## 2018-08-21 DIAGNOSIS — Z94.2 LUNG REPLACED BY TRANSPLANT (H): ICD-10-CM

## 2018-08-21 LAB
CMV DNA SPEC NAA+PROBE-ACNC: 609 [IU]/ML
CMV DNA SPEC NAA+PROBE-LOG#: 2.8 {LOG_IU}/ML
SPECIMEN SOURCE: ABNORMAL

## 2018-08-21 RX ORDER — METOCLOPRAMIDE 5 MG/1
5 TABLET ORAL 3 TIMES DAILY
Qty: 90 TABLET | Refills: 11 | Status: SHIPPED | OUTPATIENT
Start: 2018-08-21 | End: 2018-09-27

## 2018-08-21 ASSESSMENT — ENCOUNTER SYMPTOMS
JOINT SWELLING: 1
HALLUCINATIONS: 0
BLOOD IN STOOL: 0
CONSTIPATION: 0
ARTHRALGIAS: 1
FEVER: 0
STIFFNESS: 0
BACK PAIN: 0
DYSPNEA ON EXERTION: 1
TINGLING: 0
ABDOMINAL PAIN: 0
COUGH DISTURBING SLEEP: 1
CHILLS: 0
WEAKNESS: 1
MUSCLE WEAKNESS: 1
WHEEZING: 1
FATIGUE: 1
SNORES LOUDLY: 0
NIGHT SWEATS: 0
SPEECH CHANGE: 0
INCREASED ENERGY: 1
DISTURBANCES IN COORDINATION: 0
POSTURAL DYSPNEA: 0
SEIZURES: 0
NAUSEA: 1
LOSS OF CONSCIOUSNESS: 0
VOMITING: 0
RECTAL PAIN: 0
PARALYSIS: 0
POLYDIPSIA: 0
BOWEL INCONTINENCE: 0
WEIGHT LOSS: 1
SPUTUM PRODUCTION: 1
NUMBNESS: 0
TREMORS: 0
MYALGIAS: 0
DECREASED APPETITE: 0
COUGH: 1
POLYPHAGIA: 0
HEARTBURN: 0
HEADACHES: 0
NECK PAIN: 0
JAUNDICE: 0
MUSCLE CRAMPS: 0
DIARRHEA: 1
SHORTNESS OF BREATH: 1
ALTERED TEMPERATURE REGULATION: 1
WEIGHT GAIN: 0
MEMORY LOSS: 0
DIZZINESS: 1
BLOATING: 0
HEMOPTYSIS: 0

## 2018-08-21 NOTE — TELEPHONE ENCOUNTER
Prior Authorization Approval    Authorization Effective Date: 8/20/2018  Authorization Expiration Date: 2/20/2019  Medication: Pantoprazole 40mg-APPROVED  Approved Dose/Quantity:   Reference #: [id number]   Insurance Company: [employer] - Phone [phone] Fax [phone]  Expected CoPay:       CoPay Card Available:      Foundation Assistance Needed:    Which Pharmacy is filling the prescription (Not needed for infusion/clinic administered): [facility] [address] [address]  Pharmacy Notified: Yes  Patient Notified: No-Pharmacy will notify patient when ready.

## 2018-08-21 NOTE — TELEPHONE ENCOUNTER
Patient had CT done 8/20/18 to evaluate dilated loops of bowel seen on chest x-ray. Per Ame, no obstruction noted on CT. She is retaining food in her stomach on CT, so she thinks it's related to delayed gastric emptying. Restart metoclopramide 5 mg TID. Discussed with patient to try this for at least a week and see if this helps.     on 8/20/18. Valcyte treatment dose adjusted to 450 mg daily for CrCl 39. Recheck CMV next week.

## 2018-08-22 ENCOUNTER — OFFICE VISIT (OUTPATIENT)
Dept: INFECTIOUS DISEASES | Facility: CLINIC | Age: 58
End: 2018-08-22
Attending: INTERNAL MEDICINE
Payer: COMMERCIAL

## 2018-08-22 VITALS
DIASTOLIC BLOOD PRESSURE: 80 MMHG | HEIGHT: 64 IN | TEMPERATURE: 98 F | HEART RATE: 79 BPM | BODY MASS INDEX: 20.54 KG/M2 | OXYGEN SATURATION: 99 % | WEIGHT: 120.3 LBS | SYSTOLIC BLOOD PRESSURE: 130 MMHG

## 2018-08-22 DIAGNOSIS — A42.9 ACTINOMYCES INFECTION: ICD-10-CM

## 2018-08-22 DIAGNOSIS — Z94.2 LUNG REPLACED BY TRANSPLANT (H): ICD-10-CM

## 2018-08-22 PROCEDURE — G0463 HOSPITAL OUTPT CLINIC VISIT: HCPCS | Mod: ZF

## 2018-08-22 ASSESSMENT — PAIN SCALES - GENERAL: PAINLEVEL: NO PAIN (0)

## 2018-08-22 NOTE — PROGRESS NOTES
[facility]  Transplant Infectious Disease Clinic Note:  New Patient     Patient:  Kecia Blue, Date of birth 1960, Medical record number [id number]  Date of Visit:  08/22/2018  Consult requested by   for evaluation of .         Assessment and Recommendations:   Recommendations:    Assessment:    Infectious Disease issues include:  -   - PCP prophylaxis:  - Serostatus:  - Immunization status:  - Gamma globulin status:  - Isolation status:  Good hand hygiene.             History of the Infectious Disease lllness:         Transplants:  3/1/2018 (Lung); Postoperative day:  174.  Coordinator Zeny Tony    Review of Systems:  CONSTITUTIONAL:  No fevers or chills. No night sweats.  EYES: negative for icterus or acute vision changes.   ENT:  negative for hearing loss, tinnitus or sore throat  RESPIRATORY:  negative for cough, sputum, dyspnea  CARDIOVASCULAR:  negative for chest pain, heart palpitations  GASTROINTESTINAL:  negative for nausea, vomiting, diarrhea or constipation  GENITOURINARY:  negative for dysuria or hematuria.  HEME:  No easy bruising or bleeding  INTEGUMENT:  negative for rash or pruritus  NEURO:  Negative for headache or tremor.    Past Medical History:   Diagnosis Date     Antisynthetase syndrome (H) 2014     Chronic cough      Dehydration 8/1/2018     Dermatomyositis (H)      Dysplasia of cervix, low grade (ESTRADA 1)      ILD (interstitial lung disease) (H)      Osteopenia      PONV (postoperative nausea and vomiting)      Pulmonary hypertension      Raynaud's disease      Seronegative rheumatoid arthritis (H)        Past Surgical History:   Procedure Laterality Date     BRONCHOSCOPY (RIGID OR FLEXIBLE), DIAGNOSTIC N/A 4/10/2018    Procedure: COMBINED BRONCHOSCOPY (RIGID OR FLEXIBLE), LAVAGE;;  Surgeon: Mariposa Donohue MD;  Location:  GI     BRONCHOSCOPY FLEXIBLE N/A 3/13/2018    Procedure: BRONCHOSCOPY FLEXIBLE;  Flexible Bronchoscopy ;  Surgeon:  Gissell Sanchez MD;  Location:  GI     BRONCHOSCOPY FLEXIBLE N/A 5/9/2018    Procedure: BRONCHOSCOPY FLEXIBLE;;  Surgeon: Wilber Lin MD;  Location: [facility]     BRONCHOSCOPY RIGID N/A 6/6/2018    Procedure: BRONCHOSCOPY RIGID;;  Surgeon: Lopez Macias MD;  Location: [facility]     BRONCHOSCOPY, DILATE BRONCHUS, STENT BRONCHUS, COMBINED N/A 6/11/2018    Procedure: COMBINED BRONCHOSCOPY, DILATE BRONCHUS, STENT BRONCHUS;  Flexible Bronchoscopy, Balloon Dilation, Bronchial Washings;  Surgeon: Wilber Lin MD;  Location:  OR     BRONCHOSCOPY, DILATE BRONCHUS, STENT BRONCHUS, COMBINED Right 7/10/2018    Procedure: COMBINED BRONCHOSCOPY, DILATE BRONCHUS, STENT BRONCHUS;  Flexible Bronchoscopy, Balloon Dilation, Bronchial Washings  ;  Surgeon: Wilber Lin MD;  Location:  OR     BRONCHOSCOPY, DILATE BRONCHUS, STENT BRONCHUS, COMBINED N/A 8/2/2018    Procedure: COMBINED BRONCHOSCOPY, DILATE BRONCHUS, STENT BRONCHUS;  Flexible Bronchoscopy, Bronchial Washings, Balloon Dilation;  Surgeon: Wilber Lin MD;  Location:  OR     BRONCHOSCOPY, DILATE BRONCHUS, STENT BRONCHUS, COMBINED N/A 8/20/2018    Procedure: COMBINED BRONCHOSCOPY, DILATE BRONCHUS, STENT BRONCHUS;  Flexible Bronchoscopy, Balloon Dilation;  Surgeon: Wilber Lin MD;  Location:  OR     ENT SURGERY      tonsillectomy as a child     INSERT EXTRACORPORAL MEMBRANE OXYGENATOR ADULT (OUTSIDE OR) N/A 2/27/2018    Procedure: INSERT EXTRACORPORAL MEMBRANE OXYGENATOR ADULT (OUTSIDE OR);  INSERT EXTRACORPORAL MEMBRANE OXYGENATOR ADULT (OUTSIDE OR) ;  Surgeon: Toby Hernandez MD;  Location:  OR     no prior surgery       REMOVE EXTRACORPORAL MEMBRANE OXYGENATOR ADULT N/A 3/3/2018    Procedure: REMOVE EXTRACORPORAL MEMBRANE OXYGENATOR ADULT;  Removal of Right Femoral Venous and Right Axillary Arterial Extracorporeal Membrane Oxygenator;  Surgeon: Toby Hernandez MD;  Location:  OR      TRANSPLANT LUNG RECIPIENT SINGLE X2 Bilateral 3/1/2018    Procedure: TRANSPLANT LUNG RECIPIENT SINGLE X2;  Median Sternotomy, Extracorporeal Membrane Oxygenator, bilateral sequential lung transplant;  Surgeon: Toby Hernandez MD;  Location:  OR       Family History   Problem Relation Age of Onset     Hypertension Mother      Arthritis Mother      Pancreatic Cancer Father      Diabetes Father        Social History     Social History Narrative     Social History   Substance Use Topics     Smoking status: Never Smoker     Smokeless tobacco: Never Used     Alcohol use No       Immunization History   Administered Date(s) Administered     Pneumo Conj 13-V (2010&after) 02/24/2016     Pneumococcal 23 valent 11/20/2014     Tdap (Adult) Unspecified Formulation 02/04/2008       Patient Active Problem List   Diagnosis     Acute on chronic respiratory failure with hypoxia (H)     ILD (interstitial lung disease) (H)     Lung transplant recipient (H)     Steroid-induced hyperglycemia     Deep vein thrombosis (DVT) (H) [I82.409]     Encounter for long-term (current) use of high-risk medication     Dehydration     Acute kidney injury (H)     CMV (cytomegalovirus) (H)       Outpatient Prescriptions Marked as Taking for the 8/22/18 encounter (Office Visit) with Audra Snyder DO   Medication Sig     albuterol (PROAIR HFA/PROVENTIL HFA/VENTOLIN HFA) 108 (90 Base) MCG/ACT Inhaler Inhale 2 puffs into the lungs every 6 hours as needed for shortness of breath / dyspnea or wheezing     calcium-vitamin D (CALTRATE) 600-400 MG-UNIT per tablet Take 1 tablet by mouth daily     enoxaparin (LOVENOX) 60 MG/0.6ML injection Inject 0.6 mLs (60 mg) Subcutaneous every 12 hours     magnesium oxide (MAG-OX) 400 MG tablet Take 1 tablet (400 mg) by mouth daily     metoprolol tartrate (LOPRESSOR) 25 MG tablet Take 1 tablet (25 mg) by mouth 2 times daily     multivitamin, therapeutic with minerals (THERA-VIT-M) TABS tablet Take 1 tablet by mouth  "daily     mycophenolate (GENERIC EQUIVALENT) 250 MG capsule Take 6 capsules (1,500 mg) by mouth 2 times daily     nystatin (MYCOSTATIN) 514878 UNIT/ML suspension Take 10 mLs (1,000,000 Units) by mouth 4 times daily     pantoprazole (PROTONIX) 40 MG EC tablet Take 1 tablet (40 mg) by mouth 2 times daily     pentoxifylline (TRENTAL) 400 MG CR tablet Take 1 tablet (400 mg) by mouth 2 times daily     predniSONE (DELTASONE) 2.5 MG tablet Take 1 tablet (2.5 mg) by mouth daily 6/22/18             7.5                    7.5  7/20/18             7.5                    5  8/24/18             5                       5  9/21/18             5                       2.5     predniSONE (DELTASONE) 5 MG tablet Follow taper card     sulfamethoxazole-trimethoprim (BACTRIM DS/SEPTRA DS) 800-160 MG per tablet Take 1 tablet by mouth daily X one month.     tacrolimus (GENERIC EQUIVALENT) 0.5 MG capsule Take 1 capsule (0.5 mg) by mouth every morning Along with five 1mg capsules for total dose of 5.5mg am and 6mg pm.     tacrolimus (GENERIC EQUIVALENT) 1 MG capsule Take 5 mg in the AM and 6 mg in the PM along with one 0.5mg in the am for total dose 5.5mg am and 6mg pm.     valGANciclovir (VALCYTE) 450 MG tablet Take 450 mg by mouth daily     warfarin (COUMADIN) 1 MG tablet Take 4mg MWFSat and 6mg TuThSun, or as directed by the Medication Monitoring Clinic at the [facility].       No Known Allergies           Physical Exam:   Vitals were reviewed.  All vitals stable  /80  Pulse 79  Temp 98  F (36.7  C)  Ht 1.626 m (5' 4\")  Wt 54.6 kg (120 lb 4.8 oz)  LMP 06/07/2014 (Exact Date)  SpO2 99%  BMI 20.65 kg/m2    Exam:  GENERAL:  well-developed, well-nourished, alert, oriented, in no acute distress.  HEENT:  Head is normocephalic, atraumatic   EYES:  Eyes have anicteric sclerae.    ENT:  Oropharynx is moist without exudates or ulcers.  NECK:  Supple.  LUNGS:  Clear to auscultation.  CARDIOVASCULAR:  Regular rate and rhythm with no " murmurs, gallops or rubs.  ABDOMEN:  Normal bowel sounds, soft, nontender.  SKIN:  No acute rashes.  Line is in place without any surrounding erythema.  NEUROLOGIC:  Grossly nonfocal.         Laboratory Data:     No results found for: ACD4    Inflammatory Markers  No lab results found.    Invalid input(s): RATE, AUTO, ESR, WESR    Immune Globulin Studies     Recent Labs   Lab Test  03/01/18   0356  02/19/18   0759   IGG  698  1790*   IGM   --   502*   IGE   --   <2   IGA   --   425*   IGG1   --   1300*   IGG2   --   131*   IGG3   --   101   IGG4   --   1*       Metabolic Studies    Recent Labs   Lab Test  08/20/18   0552  08/04/18   0709  08/03/18   0624   07/20/18   0919   03/04/18   1953   03/04/18   0353   NA  134  136  136   < >   --    < >  150*   < >  150*   POTASSIUM  4.2  4.6  4.9   < >   --    < >  4.2   < >  4.2   CHLORIDE  100  105  108   < >   --    < >  119*   < >  117*   CO2  25  25  20   < >   --    < >  22   < >  20   ANIONGAP  9  6  8   < >   --    < >  9   < >  13   BUN  24  19  23   < >   --    < >  71*   < >  69*   CR  1.37*  0.99  1.23*   < >   --    < >  2.13*   < >  2.14*   57070   --    --    --    --   1.30*   < >   --    --    --    GFRESTIMATED  40*  58*  45*   < >   --    < >  24*   < >  24*   GLC  90  113*  108*   < >   --    < >  111*   < >  133*   CHELSEY  9.3  8.5  8.2*   < >   --    < >  7.2*   < >  7.8*   PHOS   --    --   2.6   --    --    < >   --    --   5.0*   MAG  1.6   --   1.7   < >   --    < >  2.5*   --   2.8*   LACT   --    --    --    --    --    --   0.8   < >  1.0   CKT   --    --    --    --    --    --    --    --   207    < > = values in this interval not displayed.       Hepatic Studies    Recent Labs   Lab Test  08/03/18   0624  08/02/18   1151  07/09/18   0932   03/21/18   0529   BILITOTAL  0.3  0.2  0.2   --    --    DBIL  <0.1  0.1   --    --    --    ALKPHOS  72  87  95   --    --    PROTTOTAL  5.7*  6.2*  7.2   --   5.6*   ALBUMIN  2.5*  2.6*  2.8*   < >  2.5*    AST  8  15  16   --    --    ALT  18  21  23   < >  26   LDH   --   200   --    --   361*    < > = values in this interval not displayed.       Pancreatitis testing    Recent Labs   Lab Test  03/04/18 0353 02/19/18   0759   AMYLASE   --   58   TRIG  191*  115       Lipid testing    Recent Labs   Lab Test  03/04/18 0353 02/19/18 0759   CHOL   --   168   HDL   --   48*   LDL   --   97   TRIG  191*  115       Gout Labs    No lab results found.    Hematology Studies     Recent Labs   Lab Test  08/20/18   0552  08/04/18   0709   08/03/18 0624 08/02/18   1100   07/20/18   0909   WBC  8.2  6.8   --   5.9   --   6.1   < >   --    26078   --    --    --    --    --    --    --   6.8   ANEU   --   6.5   --   5.8   --   5.7   --    --    ALYM   --   0.2*   --   0.1*   --   0.2*   --    --    SHERRI   --   0.1   --   0.0   --   0.1   --    --    AEOS   --   0.0   --   0.0   --   0.0   --    --    HGB  7.5*  7.5*   < >  7.3*   < >  6.5*   < >   --    03127   --    --    --    --    --    --    --   8.0*   HCT  25.2*  24.4*   --   23.6*   --   21.6*   < >   --    PLT  587*  324   --   300   --   301   < >   --    36329   --    --    --    --    --    --    --   288    < > = values in this interval not displayed.       Clotting Studies    Recent Labs   Lab Test  08/20/18   0553 08/17/18 08/13/18 08/08/18 08/03/18 0624 07/20/18   0920   INR  1.03  1.7  2.11  1.73   < >  1.09   < >   --    72439   --    --    --    --    --    --    --   2.09*   PTT   --    --    --    --    --   26   --    --     < > = values in this interval not displayed.       Iron Testing    Recent Labs   Lab Test  08/20/18   0553  08/20/18   0552  08/04/18   0709  08/03/18   0624  08/02/18   1100  08/01/18   0921  07/09/18   0932 05/08/18   0709   IRON   --    --    --    --    --   93   --    --   48   FEB   --    --    --    --    --   248   --    --   275   IRONSAT   --    --    --    --    --   37   --    --   18   YOLA   --    --    --     --    --   571*   --    --    --    MCV   --   103*  97  97  98  101*  101*   < >  102*   FOLIC  43.4   --    --    --    --    --    --    --   34.3   B12   --    --    --    --    --   1753*   --    --   2247*    < > = values in this interval not displayed.       Markers  No results found for: FETO    Autoimmune Testing   No lab results found.    Invalid input(s): ANCAB, PANCA, CANCA    Arterial Blood Gas Testing    Recent Labs   Lab Test  03/07/18   0355  03/07/18   0354  03/07/18   0010  03/06/18   2208   03/06/18   1905  03/06/18   1822   03/06/18   1509   PH   --   7.47*   --   7.45   --   7.43  7.43   --   7.41   PCO2   --   35   --   36   --   36  36   --   36   PO2   --   113*   --   159*   --   156*  142*   --   215*   HCO3   --   26   --   25   --   24  24   --   23   O2PER  2L  2L  2L  3L   < >  40  40   < >  50    < > = values in this interval not displayed.        Thyroid Studies     Recent Labs   Lab Test  08/01/18   0921  02/19/18   0759   TSH  1.80  3.07       Urine Studies     Recent Labs   Lab Test  03/04/18   1425  03/01/18   0528  02/23/18   1820  02/21/18   1500  02/19/18   0822   URINEPH  5.5  5.5  6.5  7.5*  5.0   NITRITE  Negative  Negative  Negative  Negative  Negative   LEUKEST  Negative  Negative  Negative  Negative  Trace*   WBCU  5  4  <1  <1  5*       Medication levels    Recent Labs   Lab Test  08/20/18   0553   03/10/18   1710   VANCOMYCIN   --    --   26.4*   TACROL  12.1   < >   --     < > = values in this interval not displayed.       CSF testing   No lab results found.    Invalid input(s): CADAM, EVPCR, ENTPCR, ENTEROVIRUS    Microbiology:  Last 6 Culture results with specimen source  Culture Micro   Date Value Ref Range Status   08/02/2018 (A)  Final    On day 7, isolated in broth only:  Actinomyces species  Susceptibility testing not routinely done  This organism is part of normal alo, but on occasion, may be a true pathogen. Clinical   correlation must be applied to  interpreting this microbiology result.     08/02/2018 Light growth  Normal respiratory alo    Final   08/02/2018   Preliminary    Culture received and in progress.  Positive AFB results are called as soon as detected.    Final report to follow in 7 to 8 weeks.     08/02/2018   Preliminary    Assayed at [facility] [address] [place] [address] [phone]   08/02/2018 Culture negative after 2 weeks  Preliminary   08/02/2018 No growth after 20 days  Preliminary   08/02/2018 (A)  Final    Light growth  Gardnerella vaginalis  Susceptibility testing not routinely done     08/02/2018 Light growth  Normal respiratory alo    Final    Specimen Description   Date Value Ref Range Status   08/02/2018 Feces  Final   08/02/2018 Feces  Final   08/02/2018 Bronchial washing Right  Final   08/02/2018 Bronchial washing Right  Final   08/02/2018 Bronchial washing Right  Final   08/02/2018 Bronchial washing Right  Final   08/02/2018 Bronchial washing Right  Final   08/02/2018 Bronchial washing Right  Final   08/02/2018 Bronchial washing Right  Final   08/02/2018 Bronchial washing Right  Final        Last check of C difficile  C Diff Toxin B PCR   Date Value Ref Range Status   08/02/2018 Positive (A) NEG^Negative Final     Comment:     Positive: Toxin producing Clostridium difficile target DNA sequences detected,   presumed positive for Clostridium difficile toxin B.  Clostridium difficile (Requires Enteric Isolation)  FDA approved assay performed using PHARMAJET GeneXpert real-time PCR.  Critical Value/Significant Value called to and read back by  ABBY GUERRIER RN (6C).  08.03.18  0031 GJS         Beta D Glucan levels (Fungitell assay)    No components found for: FUNGTL     No components found for: AFBSTN    Syphilis Testing  Invalid input(s): WOC3070    Tick Testing  No lab results found.    Invalid input(s): APHAGM    ASO Testing  Invalid input(s): UPI0409    Virology:  CMV viral loads    Recent Labs   Lab Test   08/20/18   0552  08/02/18   1354  08/01/18   0921  07/20/18   0909  07/13/18   0851  07/10/18   1022  07/09/18   0933  06/29/18   1021  06/19/18   0928   05/29/18   0938   CSPEC  Plasma, EDTA anticoagulant  Bronchial washing  Plasma, EDTA anticoagulant   --    --   Lung  Lung  Plasma, EDTA anticoagulant   --    --    < >   --    CMVLOG  2.8*  6.5*  3.8*   --    --   4.0*  4.3*  2.4*   --    --    < >   --    57852   --    --    --   67691*  802*   --    --   Undetected  Undetected   --   Undetected    < > = values in this interval not displayed.       CMV viral loads    Log IU/mL of CMVQNT   Date Value Ref Range Status   08/20/2018 2.8 (H) <2.1 [Log_IU]/mL Final   08/02/2018 6.5 (H) <2.1 [Log_IU]/mL Final   08/01/2018 3.8 (H) <2.1 [Log_IU]/mL Final   07/10/2018 4.3 (H) <2.1 [Log_IU]/mL Final   07/10/2018 4.0 (H) <2.1 [Log_IU]/mL Final   07/09/2018 2.4 (H) <2.1 [Log_IU]/mL Final   06/05/2018 Not Calculated <2.1 [Log_IU]/mL Final   05/16/2018 Not Calculated <2.1 [Log_IU]/mL Final   05/08/2018 Not Calculated <2.1 [Log_IU]/mL Final   04/19/2018 Not Calculated <2.1 [Log_IU]/mL Final   04/18/2018 Not Calculated <2.1 [Log_IU]/mL Final   04/16/2018 Not Calculated <2.1 [Log_IU]/mL Final   04/10/2018 Not Calculated <2.1 [Log_IU]/mL Final   04/09/2018 Not Calculated <2.1 [Log_IU]/mL Final   04/05/2018 Not Calculated <2.1 [Log_IU]/mL Final   04/03/2018 Not Calculated <2.1 [Log_IU]/mL Final   03/30/2018 Not Calculated <2.1 [Log_IU]/mL Final   03/26/2018 Not Calculated <2.1 [Log_IU]/mL Final   03/01/2018 Canceled, Test credited <2.1 [Log_IU]/mL Final     Comment:     Incorrectly ordered by PCU/Clinic  reordered as sendout test     03/01/2018 Canceled, Test credited <2.1 [Log_IU]/mL Final     Comment:     Incorrectly ordered by PCU/Clinic  reordered as sendout test       CMV DNA Quant (External)   Date Value Ref Range Status   07/20/2018 28151 (A) Undetected IU/mL Final   07/13/2018 802 (A) Undetected IU/mL Final   06/29/2018  Undetected Undetected IU/mL Final   06/19/2018 Undetected Undetected IU/mL Final   05/29/2018 Undetected Undetected IU/mL Final       CMV resistance testing  Recent Labs   Lab Test  08/03/18   0624   CMVCID  Sensitive   CMVFOS  Sensitive   CMVGAN  Sensitive     CMV Cidofovir Resist   Date Value Ref Range Status   08/03/2018 Sensitive not reported Final     CMV Foscarnet Resist   Date Value Ref Range Status   08/03/2018 Sensitive not reported Final     CMV Ganciclovir Resis   Date Value Ref Range Status   08/03/2018 Sensitive not reported Final     Comment:     (Note)  INTERPRETIVE INFORMATION: CMV Antiviral Resistance  Codons 457-630 of the UL97 gene and codons 393-1000 of the   UL54 gene are sequenced. Mutations associated with   resistance to ganciclovir, cidofovir, and foscarnet are   reported. Mutations in viral sub-populations below 20   percent of total may not be detected.  Test developed and characteristics determined by [facility]. See Compliance Statement B: [facility]/  Performed by [facility],  [address] [place] [id number] [phone]  [email or web address], Bradley Reyna MD, Lab. Director         No results found for: H6RES    EBV DNA Copies/mL   Date Value Ref Range Status   08/01/2018 EBV DNA Not Detected EBVNEG^EBV DNA Not Detected [Copies]/mL Final       BK viral loads No lab results found.    Parvovirus Testing  No lab results found.    Invalid input(s): PRVRES    Adenovirus Testing  Invalid input(s): ADENAB, ADAG, ADENOVIRUS, ADQT    Hepatitis B Testing     Recent Labs   Lab Test  06/05/18   1029  03/01/18   0356  02/19/18   0759   AUSAB   --   0.41  0.64   HBCAB   --   Nonreactive  Nonreactive   HEPBANG  Nonreactive  Nonreactive  Nonreactive   HBQRES   --   HBV DNA Not Detected   --      Was the last Hepatitis B E antigen positive?   No results found for: HBEAGN     Hepatitis C Antibody   Date Value Ref Range Status   02/19/2018 Nonreactive NR^Nonreactive Final     Comment:      Assay performance characteristics have not been established for newborns,   infants, and children         No results found for: RS, FLUAG    CMV Antibody IgG   Date Value Ref Range Status   03/01/2018 Canceled, Test credited 0.0 - 0.8 AI Final     Comment:     Test reordered as correct code  SEE CMV QUANTITATIVE PCR     03/01/2018 >8.0 (H) 0.0 - 0.8 AI Final     Comment:     Positive  Antibody index (AI) values reflect qualitative changes in antibody   concentration that cannot be directly associated with clinical condition or   disease state.     02/19/2018 >8.0 (H) 0.0 - 0.8 AI Final     Comment:     Positive  Antibody index (AI) values reflect qualitative changes in antibody   concentration that cannot be directly associated with clinical condition or   disease state.       Varicella Zoster Virus Antibody IgG   Date Value Ref Range Status   02/19/2018 3.8 (H) 0.0 - 0.8 AI Final     Comment:     Positive, suggests prev. exposure and probable immunity  Antibody index (AI) values reflect qualitative changes in antibody   concentration that cannot be directly associated with clinical condition or   disease state.       EBV Capsid Antibody IgG   Date Value Ref Range Status   03/01/2018 >8.0 (H) 0.0 - 0.8 AI Final     Comment:     Positive, suggests recent or past exposure  Antibody index (AI) values reflect qualitative changes in antibody   concentration that cannot be directly associated with clinical condition or   disease state.     02/19/2018 >8.0 (H) 0.0 - 0.8 AI Final     Comment:     Positive, suggests recent or past exposure  Antibody index (AI) values reflect qualitative changes in antibody   concentration that cannot be directly associated with clinical condition or   disease state.       Toxoplasma Antibody IgG   Date Value Ref Range Status   02/19/2018 <3.0 0.0 - 7.1 IU/mL Final     Comment:     Negative- Absence of detectable Toxoplasma gondii IgG antibodies. A negative   result does not rule out acute  infection.  The magnitude of the measured result is not indicative of the amount of   antibody present. The concentrations of anti-Toxoplasma gondii IgG in a given   specimen determined with assays from different manufacturers can vary due to   differences in assay methods and reagent specificity.       Herpes Simplex Virus Type 1 IgG   Date Value Ref Range Status   03/01/2018 >8.0 (H) 0.0 - 0.8 AI Final     Comment:     Positive.  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  Antibody index (AI) values reflect qualitative changes in antibody   concentration that cannot be directly associated with clinical condition or   disease state.     02/19/2018 >8.0 (H) 0.0 - 0.8 AI Final     Comment:     Positive.  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  Antibody index (AI) values reflect qualitative changes in antibody   concentration that cannot be directly associated with clinical condition or   disease state.       Herpes Simplex Virus Type 2 IgG   Date Value Ref Range Status   03/01/2018 <0.2 0.0 - 0.8 AI Final     Comment:     No HSV-2 IgG antibodies detected.  Antibody index (AI) values reflect qualitative changes in antibody   concentration that cannot be directly associated with clinical condition or   disease state.     02/19/2018 <0.2 0.0 - 0.8 AI Final     Comment:     No HSV-2 IgG antibodies detected.  Antibody index (AI) values reflect qualitative changes in antibody   concentration that cannot be directly associated with clinical condition or   disease state.         No components found for: FRQ0265    Pathology:      Imaging:  Results for orders placed or performed during the hospital encounter of 08/20/18   CT Abdomen Pelvis w/o Contrast    Narrative    Exam: CT abdomen and pelvis without contrast    Comparison: Chest radiograph 8/20/2018, CT chest 5/8/2018, CT CAP  3/8/2018    History: , Stable dilated loops of bowel in the upper abdomen to 4.5  cm. Patient is symptomatic with nausea and diarrhea.    Technique: CT of the  abdomen and pelvis was obtained without  intravenous contrast. Coronal and sagittal reconstructions were  obtained and reviewed.    Findings:  Images are somewhat degraded by motion artifact.    Lower chest: Small bilateral pleural effusions. No pneumothorax.  Basilar groundglass opacity, greater on the left and the right, most  consistent with atelectasis. Stable linear fibrotic changes in the  lower lobes. Partially visualized median sternotomy.    Abdomen/pelvis: Hyperdense material in the stomach, likely represents  ingested material. Normal noncontrast appearance of the liver,  gallbladder, pancreas, spleen, adrenal glands, and kidneys. Prominent  loops of small bowel in the upper abdomen measuring up to 3.5 cm  without evidence of obstruction or transition point. Liquid stool in  the colon. The rectum is distended with air and measures up to 8.9 cm.  The bladder is partially distended and normal in appearance. No pelvic  masses. Scattered pelvic phleboliths. Normal caliber of the aorta.  Shotty retroperitoneal and mesenteric lymphadenopathy, similar in  appearance to CT dated 3/8/2018. No free intraperitoneal air. Trace  free fluid in the pelvis.    Bones and soft tissues: Scattered foci of subcutaneous free air in the  anterior abdominal wall with adjacent soft tissue nodularity, likely  related to medication injection. Mild degenerative changes of the  spine. No suspicious appearing bony lesions.      Impression    Impression:   1. Mildly dilated loops of small bowel without evidence of  obstruction.   2. Small bilateral pleural effusions and associated bibasilar  atelectasis.    I have personally reviewed the examination and initial interpretation  and I agree with the findings.    JAMES LAI MD

## 2018-08-22 NOTE — MR AVS SNAPSHOT
After Visit Summary   8/22/2018    Kecia Blue    MRN: [id number]           Patient Information     Date Of Birth          1960        Visit Information        Provider Department      8/22/2018 2:00 PM Audra Snyder DO [facility]        Today's Diagnoses     Actinomyces infection        Lung replaced by transplant (H)           Follow-ups after your visit        Follow-up notes from your care team     Return in about 1 month (around 9/22/2018).      Your next 10 appointments already scheduled     Oct 24, 2018 10:30 AM CDT   Lab with  LAB   [facility] ([facility])    [address] [place] [id number] [phone]            Oct 24, 2018 10:45 AM CDT   XR CHEST 2 VIEWS with XR1   [facility] Xray ([facility])    [address] [place] [id number] [phone]           How do I prepare for my exam? (Food and drink instructions) No Food and Drink Restrictions.  How do I prepare for my exam? (Other instructions) You do not need to do anything special for this exam.  What should I wear: Wear comfortable clothes.  How long does the exam take: Most scans take less than 5 minutes.  What should I bring: Bring a list of your medicines, including vitamins, minerals and over-the-counter drugs. It is safest to leave personal items at home.  Do I need a :  No  is needed.  What do I need to tell my doctor: Tell your doctor if there s any chance you are pregnant.  What should I do after the exam: No restrictions, You may resume normal activities.  What is this test: An image of a specific body part shown in shades of black and white.  Who should I call with questions: If you have any questions, please call the Imaging Department where you will have your exam. Directions, parking instructions, and other information is available on our  website, [email or web address].            Oct 24, 2018 11:00 AM CDT   PFT VISIT with SUKI PFL DINA   [facility] Pulmonary Function Testing ([facility])    [address]  [address] [facility] [address]   [phone]            Oct 24, 2018 11:20 AM CDT   (Arrive by 11:05 AM)   Return Lung Transplant with Srinivas Mcelroy MD   [facility] Solid Organ Transplant ([facility])    [address] [place] [id number] [phone]            Dec 13, 2018  8:40 AM CST   (Arrive by 8:25 AM)   New Patient Visit with Daron Cruz PA-C   [facility] ([facility])    [address] [place] [address]  [address] [facility] [id number] [phone]              Future tests that were ordered for you today     Open Future Orders        Priority Expected Expires Ordered    CMV DNA quantification Routine  10/12/2018 9/12/2018            Who to contact     If you have questions or need follow up information about today's clinic visit or your schedule please contact [facility] directly at [phone].  Normal or non-critical lab and imaging results will be communicated to you by MyChart, letter or phone within 4 business days after the clinic has received the results. If you do not hear from us within 7 days, please contact the clinic through [employer] or phone. If you have a critical or abnormal lab result, we will notify you by phone as soon as possible.  Submit refill requests through [employer] or call your pharmacy and they will forward the refill request to us. Please allow 3 business days for your refill to be completed.          Additional Information About Your Visit        [employer] Information     [facility] gives you secure access to your electronic health record. If you see a primary care provider, you can also send messages to your care team and make  "appointments. If you have questions, please call your primary care clinic.  If you do not have a primary care provider, please call [phone] and they will assist you.        Care EveryWhere ID     This is your Care EveryWhere ID. This could be used by other organizations to access your [facility] medical records  [id number]        Your Vitals Were     Pulse Temperature Height Last Period Pulse Oximetry BMI (Body Mass Index)    79 98  F (36.7  C) 1.626 m (5' 4\") 06/07/2014 (Exact Date) 99% 20.65 kg/m2       Blood Pressure from Last 3 Encounters:   09/10/18 120/80   09/04/18 131/80   08/22/18 130/80    Weight from Last 3 Encounters:   09/10/18 54.8 kg (120 lb 13 oz)   09/04/18 54.6 kg (120 lb 4.8 oz)   08/22/18 54.6 kg (120 lb 4.8 oz)              We Performed the Following     Infectious Disease Referral        Primary Care Provider Office Phone # Fax #    Rosy Vu [phone]       [facility]  [address]  [facility] [address]        Equal Access to Services     OLIVA VALENCIA : Hadii aad ku hadasho Soomaali, waaxda luqadaha, qaybta kaalmada adeegyada, morro daily . So [facility] [phone].    ATENCIÓN: Si habla español, tiene a taylor disposición servicios gratuitos de asistencia lingüística. [facility] [phone].    We comply with applicable federal civil rights laws and [place] laws. We do not discriminate on the basis of race, color, national origin, age, disability, sex, sexual orientation, or gender identity.            Thank you!     Thank you for choosing [facility]  for your care. Our goal is always to provide you with excellent care. Hearing back from our patients is one way we can continue to improve our services. Please take a few minutes to complete the written survey that you may receive in the mail after your visit with us. Thank you!             Your Updated Medication List - Protect others " around you: Learn how to safely use, store and throw away your medicines at www.disposemymeds.org.          This list is accurate as of 8/22/18 11:59 PM.  Always use your most recent med list.                   Brand Name Dispense Instructions for use Diagnosis    ACETAMINOPHEN PO      Take 1,000 mg by mouth every 6 hours as needed for pain        albuterol 108 (90 Base) MCG/ACT inhaler    PROAIR HFA/PROVENTIL HFA/VENTOLIN HFA    1 Inhaler    Inhale 2 puffs into the lungs every 6 hours as needed for shortness of breath / dyspnea or wheezing    SOB (shortness of breath), Wheezing       calcium carbonate 600 mg-vitamin D 400 units 600-400 MG-UNIT per tablet    CALTRATE     Take 1 tablet by mouth daily    S/P lung transplant (H), ILD (interstitial lung disease) (H), Lung transplant recipient (H), Encounter for long-term (current) use of high-risk medication, Anemia, unspecified type, Cytomegalovirus (CMV) viremia (H)       enoxaparin 60 MG/0.6ML injection    LOVENOX    12 Syringe    Inject 0.6 mLs (60 mg) Subcutaneous every 12 hours    Deep vein thrombosis (DVT) of upper extremity, unspecified chronicity, unspecified laterality, unspecified vein (H)       levalbuterol 45 MCG/ACT Inhaler    XOPENEX HFA    1 Inhaler    Inhale 2 puffs into the lungs every 6 hours as needed for shortness of breath / dyspnea or wheezing    S/P lung transplant (H), Other constipation       magnesium oxide 400 MG tablet    MAG-OX    90 tablet    Take 1 tablet (400 mg) by mouth daily    Hypomagnesemia       metoclopramide 5 MG tablet    REGLAN    90 tablet    Take 1 tablet (5 mg) by mouth 3 times daily    Delayed gastric emptying       metoprolol tartrate 25 MG tablet    LOPRESSOR    60 tablet    Take 1 tablet (25 mg) by mouth 2 times daily    Paroxysmal atrial fibrillation (H)       multivitamin, therapeutic with minerals Tabs tablet     30 each    Take 1 tablet by mouth daily    Lung transplant recipient (H)       mycophenolate 250 MG  capsule    GENERIC EQUIVALENT    360 capsule    Take 6 capsules (1,500 mg) by mouth 2 times daily    S/P lung transplant (H)       nystatin 095593 UNIT/ML suspension    MYCOSTATIN    1200 mL    Take 10 mLs (1,000,000 Units) by mouth 4 times daily    Lung transplant recipient (H)       pantoprazole 40 MG EC tablet    PROTONIX    60 tablet    Take 1 tablet (40 mg) by mouth 2 times daily    Gastroesophageal reflux disease without esophagitis, ILD (interstitial lung disease) (H), Lung transplant recipient (H)       pentoxifylline 400 MG CR tablet    TRENtal    60 tablet    Take 1 tablet (400 mg) by mouth 2 times daily    Lung transplant recipient (H)       * predniSONE 2.5 MG tablet    DELTASONE    90 tablet    Take 1 tablet (2.5 mg) by mouth daily 6/22/18             7.5                    7.5 7/20/18             7.5                    5 8/24/18             5                       5 9/21/18             5                       2.5    Lung replaced by transplant (H)       * predniSONE 5 MG tablet    DELTASONE    180 tablet    Follow taper card    S/P lung transplant (H)       sulfamethoxazole-trimethoprim 800-160 MG per tablet    BACTRIM DS/SEPTRA DS    30 tablet    Take 1 tablet by mouth daily X one month.    Actinomyces infection, Lung replaced by transplant (H)       tacrolimus 1 MG capsule    GENERIC EQUIVALENT    330 capsule    Take 5 mg in the AM and 6 mg in the PM along with one 0.5mg in the am for total dose 5.5mg am and 6mg pm.    S/P lung transplant (H), Other constipation       VALCYTE 450 MG tablet   Generic drug:  valGANciclovir     30 tablet    Take 450 mg by mouth daily    Cytomegalovirus (CMV) viremia (H), ILD (interstitial lung disease) (H), Lung transplant recipient (H), Encounter for long-term (current) use of high-risk medication, Anemia, unspecified type, S/P lung transplant (H)       warfarin 1 MG tablet    COUMADIN    140 tablet    Take 4mg MWFSat and 6mg TuThSun, or as directed by the Medication  Monitoring Clinic at the [facility].    Status post lung transplantation (H), DVT (deep venous thrombosis) (H)       * Notice:  This list has 2 medication(s) that are the same as other medications prescribed for you. Read the directions carefully, and ask your doctor or other care provider to review them with you.

## 2018-08-22 NOTE — PROGRESS NOTES
[facility]  Transplant Infectious Disease Clinic Note:  New Patient     Patient:  Kecia Blue, Date of birth 1960, Medical record number [id number]  Date of Visit:  08/22/2018  Consult requested by Dr. Hale for evaluation of Actinomyces found on bronchial washings.          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 Immunosuppressive therapy consists of mycophenolate 1500 mg BID, tacrolimus 5.5mg/6mg, and prednisone taper.     On 8/2 bronchial washings done during dilation of the right main bronchial stenosis grew Actinomyces species at 7 days, on broth only. She was also noted to have Actinomyces growing from a bronchial washing on 7/10. CT scan done on 5/8 demonstrated pleural thickening and mild interstitial opacities in the lower lung zones. There was no evidence, on that CT scan, of nodular infiltrates, cavitations, or mass lesions. She also has minimal symptoms at this time and in-fact is feeling better with treatment for CMV and C Diff. From that perspective we would consider holding off treatment. However, there are reports that Actinomyces infection can be implicated in bronchial stenosis or complicate endobronchial prosthesis, although this appears to be rare. At this time, we will hold off treatment. Will have to re-evaluate how she is doing clinically and determine if treatment will be warranted.      Infectious Disease issues include:  - PCP prophylaxis: Bactrim 1 DS/daily   - Serostatus: CMV+/EBV+  - Gamma globulin status:  (3/1/2018)  - Isolation status:  Good hand hygiene. C Diff (8/2/2018), MRSA (3/1/2018)      Patient discussed with Dr. Adán Snyder,  Infectious Diseases Fellow   [phone]    Attestation:    I have seen and evaluated the patient and have discussed his/her care with the fellow, Dr. Snyder. I agree with the documented findings and plan. I have reviewed today's vital signs, medications, labs and imaging.     At this point it does not seem that the Actinomyces is causing active infection and is more likely a colonizer at the stenotic site. Additionally, we would like to limit antibiotic exposure when possible given recent C diff. However, if ongoing positive cultures and inflammation or exudate at site of stenosis, treatment could be reconsidered. Patient is scheduled for repeat bronchoscopy on 9/10 and the site can be re-evaluated then.     Mariana Mccain MD  Infectious Diseases  [phone]                History of the Infectious Disease lllness:     Transplants:  3/1/2018 (Lung); Postoperative day:  174.  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 Immunosuppressive therapy consists of mycophenolate 1500 mg BID, tacrolimus 5.5mg/6mg, and prednisone taper.     She was admitted to the hospital from 8/1-8/4 for dehydration, failure to thrive, and diarrhea. During this admission she was found to have CMV viremia and C diff colitis. She was treated for both and discharged.     She has also had recurrent right main bronchial stenosis. She most recently underwent a repeat dilation on 8/2 and again on 8/20. On 8/2 she had bronchial fluid sent for culture. On day 7, Actinomyces was isolated on broth only. She also had Gardnerella vaginalis on aerobic cultures. She was also noted to have Actinomyces growing from bronchial washing on 7/10. She has not received any treatment for Actinomyces  at this time. Her last CT scan of the chest was done on 5/08/2018 that demonstrated small bilateral pleural effusions with associated pleural thickening and mild interstitial opacities in the lower lung zones.    On ROS today, Ms. Blue denies any fevers, chills, rigors, night sweats. She has an daily cough, that occurs occasionally throughout the day, non-productive. She does not require oxygen at the present time. She is not dyspneic at rest but does have mild dyspnea with exertion. Her appetite has been stable as has her weight. She feels as if her energy level is improving.     Review of Systems:  CONSTITUTIONAL:  No fevers or chills. No night sweats.  EYES: negative for icterus or acute vision changes.   ENT:  negative for hearing loss, tinnitus or sore throat  RESPIRATORY:  Occasional cough, non productive, occasional dyspnea with exertion, none at rest  CARDIOVASCULAR:  negative for chest pain, heart palpitations  GASTROINTESTINAL:  negative for abdominal pain or cramping, nausea, vomiting. + Diarrhea   GENITOURINARY:  negative for dysuria or hematuria.  HEME:  No easy bruising or bleeding  INTEGUMENT:  negative for rash or pruritus  NEURO:  Negative for headache or tremor.    Past Medical History:   Diagnosis Date     Antisynthetase syndrome (H) 2014     Chronic cough      Dehydration 8/1/2018     Dermatomyositis (H)      Dysplasia of cervix, low grade (ESTRADA 1)      ILD (interstitial lung disease) (H)      Osteopenia      PONV (postoperative nausea and vomiting)      Pulmonary hypertension      Raynaud's disease      Seronegative rheumatoid arthritis (H)      Past Surgical History:   Procedure Laterality Date     BRONCHOSCOPY (RIGID OR FLEXIBLE), DIAGNOSTIC N/A 4/10/2018    Procedure: COMBINED BRONCHOSCOPY (RIGID OR FLEXIBLE), LAVAGE;;  Surgeon: Mariposa Donohue MD;  Location:  GI     BRONCHOSCOPY FLEXIBLE N/A 3/13/2018    Procedure: BRONCHOSCOPY FLEXIBLE;  Flexible Bronchoscopy ;  Surgeon: Daniel  Gissell Smith MD;  Location:  GI     BRONCHOSCOPY FLEXIBLE N/A 5/9/2018    Procedure: BRONCHOSCOPY FLEXIBLE;;  Surgeon: Wilber Lin MD;  Location: [facility]     BRONCHOSCOPY RIGID N/A 6/6/2018    Procedure: BRONCHOSCOPY RIGID;;  Surgeon: Lopez Macias MD;  Location:  GI     BRONCHOSCOPY, DILATE BRONCHUS, STENT BRONCHUS, COMBINED N/A 6/11/2018    Procedure: COMBINED BRONCHOSCOPY, DILATE BRONCHUS, STENT BRONCHUS;  Flexible Bronchoscopy, Balloon Dilation, Bronchial Washings;  Surgeon: Wilber Lin MD;  Location:  OR     BRONCHOSCOPY, DILATE BRONCHUS, STENT BRONCHUS, COMBINED Right 7/10/2018    Procedure: COMBINED BRONCHOSCOPY, DILATE BRONCHUS, STENT BRONCHUS;  Flexible Bronchoscopy, Balloon Dilation, Bronchial Washings  ;  Surgeon: Wilber Lin MD;  Location:  OR     BRONCHOSCOPY, DILATE BRONCHUS, STENT BRONCHUS, COMBINED N/A 8/2/2018    Procedure: COMBINED BRONCHOSCOPY, DILATE BRONCHUS, STENT BRONCHUS;  Flexible Bronchoscopy, Bronchial Washings, Balloon Dilation;  Surgeon: Wilber Lin MD;  Location:  OR     BRONCHOSCOPY, DILATE BRONCHUS, STENT BRONCHUS, COMBINED N/A 8/20/2018    Procedure: COMBINED BRONCHOSCOPY, DILATE BRONCHUS, STENT BRONCHUS;  Flexible Bronchoscopy, Balloon Dilation;  Surgeon: Wilber Lin MD;  Location:  OR     ENT SURGERY      tonsillectomy as a child     INSERT EXTRACORPORAL MEMBRANE OXYGENATOR ADULT (OUTSIDE OR) N/A 2/27/2018    Procedure: INSERT EXTRACORPORAL MEMBRANE OXYGENATOR ADULT (OUTSIDE OR);  INSERT EXTRACORPORAL MEMBRANE OXYGENATOR ADULT (OUTSIDE OR) ;  Surgeon: Toby Hernandez MD;  Location:  OR     no prior surgery       REMOVE EXTRACORPORAL MEMBRANE OXYGENATOR ADULT N/A 3/3/2018    Procedure: REMOVE EXTRACORPORAL MEMBRANE OXYGENATOR ADULT;  Removal of Right Femoral Venous and Right Axillary Arterial Extracorporeal Membrane Oxygenator;  Surgeon: Toby Hernandez MD;  Location:  OR      TRANSPLANT LUNG RECIPIENT SINGLE X2 Bilateral 3/1/2018    Procedure: TRANSPLANT LUNG RECIPIENT SINGLE X2;  Median Sternotomy, Extracorporeal Membrane Oxygenator, bilateral sequential lung transplant;  Surgeon: Toby Hernandez MD;  Location:  OR     Family History   Problem Relation Age of Onset     Hypertension Mother      Arthritis Mother      Pancreatic Cancer Father      Diabetes Father        Social History     Social History Narrative     Social History   Substance Use Topics     Smoking status: Never Smoker     Smokeless tobacco: Never Used     Alcohol use No     Immunization History   Administered Date(s) Administered     Pneumo Conj 13-V (2010&after) 02/24/2016     Pneumococcal 23 valent 11/20/2014     Tdap (Adult) Unspecified Formulation 02/04/2008     Patient Active Problem List   Diagnosis     Acute on chronic respiratory failure with hypoxia (H)     ILD (interstitial lung disease) (H)     Lung transplant recipient (H)     Steroid-induced hyperglycemia     Deep vein thrombosis (DVT) (H) [I82.409]     Encounter for long-term (current) use of high-risk medication     Dehydration     Acute kidney injury (H)     CMV (cytomegalovirus) (H)     Outpatient Prescriptions Marked as Taking for the 8/22/18 encounter (Office Visit) with Audra Snyder DO   Medication Sig     albuterol (PROAIR HFA/PROVENTIL HFA/VENTOLIN HFA) 108 (90 Base) MCG/ACT Inhaler Inhale 2 puffs into the lungs every 6 hours as needed for shortness of breath / dyspnea or wheezing     calcium-vitamin D (CALTRATE) 600-400 MG-UNIT per tablet Take 1 tablet by mouth daily     enoxaparin (LOVENOX) 60 MG/0.6ML injection Inject 0.6 mLs (60 mg) Subcutaneous every 12 hours     magnesium oxide (MAG-OX) 400 MG tablet Take 1 tablet (400 mg) by mouth daily     metoprolol tartrate (LOPRESSOR) 25 MG tablet Take 1 tablet (25 mg) by mouth 2 times daily     multivitamin, therapeutic with minerals (THERA-VIT-M) TABS tablet Take 1 tablet by mouth daily  "    mycophenolate (GENERIC EQUIVALENT) 250 MG capsule Take 6 capsules (1,500 mg) by mouth 2 times daily     nystatin (MYCOSTATIN) 399705 UNIT/ML suspension Take 10 mLs (1,000,000 Units) by mouth 4 times daily     pantoprazole (PROTONIX) 40 MG EC tablet Take 1 tablet (40 mg) by mouth 2 times daily     pentoxifylline (TRENTAL) 400 MG CR tablet Take 1 tablet (400 mg) by mouth 2 times daily     predniSONE (DELTASONE) 2.5 MG tablet Take 1 tablet (2.5 mg) by mouth daily 6/22/18             7.5                    7.5  7/20/18             7.5                    5  8/24/18             5                       5  9/21/18             5                       2.5     predniSONE (DELTASONE) 5 MG tablet Follow taper card     sulfamethoxazole-trimethoprim (BACTRIM DS/SEPTRA DS) 800-160 MG per tablet Take 1 tablet by mouth daily X one month.     tacrolimus (GENERIC EQUIVALENT) 0.5 MG capsule Take 1 capsule (0.5 mg) by mouth every morning Along with five 1mg capsules for total dose of 5.5mg am and 6mg pm.     tacrolimus (GENERIC EQUIVALENT) 1 MG capsule Take 5 mg in the AM and 6 mg in the PM along with one 0.5mg in the am for total dose 5.5mg am and 6mg pm.     valGANciclovir (VALCYTE) 450 MG tablet Take 450 mg by mouth daily     warfarin (COUMADIN) 1 MG tablet Take 4mg MWFSat and 6mg TuThSun, or as directed by the Medication Monitoring Clinic at the [facility].     No Known Allergies         Physical Exam:   Vitals were reviewed.  All vitals stable  /80  Pulse 79  Temp 98  F (36.7  C)  Ht 1.626 m (5' 4\")  Wt 54.6 kg (120 lb 4.8 oz)  LMP 06/07/2014 (Exact Date)  SpO2 99%  BMI 20.65 kg/m2  Exam:  GENERAL:  well-developed, well-nourished, alert, oriented, in no acute distress.  HEENT:  Head is normocephalic, atraumatic   EYES:  Eyes have anicteric sclerae.    ENT:  Oropharynx is moist without exudates or ulcers.  NECK:  Supple.  LUNGS:  Clear to auscultation.  CARDIOVASCULAR:  Regular rate and rhythm with no murmurs, gallops or " rubs.  ABDOMEN:  Normal bowel sounds, soft, nontender.  SKIN:  No acute rashes.    NEUROLOGIC:  Grossly nonfocal.         Laboratory Data:     Immune Globulin Studies     Recent Labs   Lab Test  03/01/18   0356  02/19/18   0759   IGG  698  1790*   IGM   --   502*   IGE   --   <2   IGA   --   425*   IGG1   --   1300*   IGG2   --   131*   IGG3   --   101   IGG4   --   1*     Metabolic Studies    Recent Labs   Lab Test  08/20/18   0552  08/04/18   0709  08/03/18   0624   07/20/18   0919   03/04/18   1953   03/04/18   0353   NA  134  136  136   < >   --    < >  150*   < >  150*   POTASSIUM  4.2  4.6  4.9   < >   --    < >  4.2   < >  4.2   CHLORIDE  100  105  108   < >   --    < >  119*   < >  117*   CO2  25  25  20   < >   --    < >  22   < >  20   ANIONGAP  9  6  8   < >   --    < >  9   < >  13   BUN  24  19  23   < >   --    < >  71*   < >  69*   CR  1.37*  0.99  1.23*   < >   --    < >  2.13*   < >  2.14*   32133   --    --    --    --   1.30*   < >   --    --    --    GFRESTIMATED  40*  58*  45*   < >   --    < >  24*   < >  24*   GLC  90  113*  108*   < >   --    < >  111*   < >  133*   CHELSEY  9.3  8.5  8.2*   < >   --    < >  7.2*   < >  7.8*   PHOS   --    --   2.6   --    --    < >   --    --   5.0*   MAG  1.6   --   1.7   < >   --    < >  2.5*   --   2.8*   LACT   --    --    --    --    --    --   0.8   < >  1.0   CKT   --    --    --    --    --    --    --    --   207    < > = values in this interval not displayed.     Hepatic Studies    Recent Labs   Lab Test  08/03/18   0624  08/02/18   1151  07/09/18   0932   03/21/18   0529   BILITOTAL  0.3  0.2  0.2   --    --    DBIL  <0.1  0.1   --    --    --    ALKPHOS  72  87  95   --    --    PROTTOTAL  5.7*  6.2*  7.2   --   5.6*   ALBUMIN  2.5*  2.6*  2.8*   < >  2.5*   AST  8  15  16   --    --    ALT  18  21  23   < >  26   LDH   --   200   --    --   361*    < > = values in this interval not displayed.     Lipid testing    Recent Labs   Lab Test  03/04/18    0353  02/19/18   0759   CHOL   --   168   HDL   --   48*   LDL   --   97   TRIG  191*  115     Hematology Studies     Recent Labs   Lab Test  08/20/18   0552  08/04/18   0709 08/03/18 0624 08/02/18   1100   07/20/18   0909   WBC  8.2  6.8   --   5.9   --   6.1   < >   --    91328   --    --    --    --    --    --    --   6.8   ANEU   --   6.5   --   5.8   --   5.7   --    --    ALYM   --   0.2*   --   0.1*   --   0.2*   --    --    SHERRI   --   0.1   --   0.0   --   0.1   --    --    AEOS   --   0.0   --   0.0   --   0.0   --    --    HGB  7.5*  7.5*   < >  7.3*   < >  6.5*   < >   --    56976   --    --    --    --    --    --    --   8.0*   HCT  25.2*  24.4*   --   23.6*   --   21.6*   < >   --    PLT  587*  324   --   300   --   301   < >   --    07879   --    --    --    --    --    --    --   288    < > = values in this interval not displayed.     Clotting Studies    Recent Labs   Lab Test  08/20/18   0553 08/17/18 08/13/18 08/08/18 08/03/18 0624 07/20/18   0920   INR  1.03  1.7  2.11  1.73   < >  1.09   < >   --    00881   --    --    --    --    --    --    --   2.09*   PTT   --    --    --    --    --   26   --    --     < > = values in this interval not displayed.     Iron Testing    Recent Labs   Lab Test  08/20/18 0553 08/20/18   0552  08/04/18 0709 08/03/18 0624 08/02/18   1100  08/01/18   0921  07/09/18   0932   05/08/18   0709   IRON   --    --    --    --    --   93   --    --   48   FEB   --    --    --    --    --   248   --    --   275   IRONSAT   --    --    --    --    --   37   --    --   18   YOLA   --    --    --    --    --   571*   --    --    --    MCV   --   103*  97  97  98  101*  101*   < >  102*   FOLIC  43.4   --    --    --    --    --    --    --   34.3   B12   --    --    --    --    --   1753*   --    --   2247*    < > = values in this interval not displayed.     Arterial Blood Gas Testing    Recent Labs   Lab Test  03/07/18   0355  03/07/18   0354  03/07/18    0010  03/06/18   2208   03/06/18   1905  03/06/18   1822   03/06/18   1509   PH   --   7.47*   --   7.45   --   7.43  7.43   --   7.41   PCO2   --   35   --   36   --   36  36   --   36   PO2   --   113*   --   159*   --   156*  142*   --   215*   HCO3   --   26   --   25   --   24  24   --   23   O2PER  2L  2L  2L  3L   < >  40  40   < >  50    < > = values in this interval not displayed.      Thyroid Studies     Recent Labs   Lab Test  08/01/18   0921  02/19/18   0759   TSH  1.80  3.07     Medication levels    Recent Labs   Lab Test  08/20/18   0553   03/10/18   1710   VANCOMYCIN   --    --   26.4*   TACROL  12.1   < >   --     < > = values in this interval not displayed.     Microbiology:  Last 6 Culture results with specimen source  Culture Micro   Date Value Ref Range Status   08/02/2018 (A)  Final    On day 7, isolated in broth only:  Actinomyces species  Susceptibility testing not routinely done  This organism is part of normal alo, but on occasion, may be a true pathogen. Clinical   correlation must be applied to interpreting this microbiology result.     08/02/2018 Light growth  Normal respiratory alo    Final   08/02/2018   Preliminary    Culture received and in progress.  Positive AFB results are called as soon as detected.    Final report to follow in 7 to 8 weeks.     08/02/2018   Preliminary    Assayed at [facility] [employer] [address] [place] [id number] [phone]   08/02/2018 Culture negative after 2 weeks  Preliminary   08/02/2018 No growth after 20 days  Preliminary   08/02/2018 (A)  Final    Light growth  Gardnerella vaginalis  Susceptibility testing not routinely done     08/02/2018 Light growth  Normal respiratory alo    Final    Specimen Description   Date Value Ref Range Status   08/02/2018 Feces  Final   08/02/2018 Feces  Final   08/02/2018 Bronchial washing Right  Final   08/02/2018 Bronchial washing Right  Final   08/02/2018 Bronchial washing Right  Final   08/02/2018  Bronchial washing Right  Final   08/02/2018 Bronchial washing Right  Final   08/02/2018 Bronchial washing Right  Final   08/02/2018 Bronchial washing Right  Final   08/02/2018 Bronchial washing Right  Final        Last check of C difficile  C Diff Toxin B PCR   Date Value Ref Range Status   08/02/2018 Positive (A) NEG^Negative Final     Comment:     Positive: Toxin producing Clostridium difficile target DNA sequences detected,   presumed positive for Clostridium difficile toxin B.  Clostridium difficile (Requires Enteric Isolation)  FDA approved assay performed using "[employer] GeneXpert real-time PCR.  Critical Value/Significant Value called to and read back by  ABBY GUERRIER RN (6C).  08.03.18  0031 [facility]       Virology:  CMV viral loads    Recent Labs   Lab Test  08/20/18   0552  08/02/18   1354  08/01/18   0921  07/20/18   0909  07/13/18   0851  07/10/18   1022  07/09/18   0933  06/29/18   1021  06/19/18   0928   05/29/18   0938   CSPEC  Plasma, EDTA anticoagulant  Bronchial washing  Plasma, EDTA anticoagulant   --    --   Lung  Lung  Plasma, EDTA anticoagulant   --    --    < >   --    CMVLOG  2.8*  6.5*  3.8*   --    --   4.0*  4.3*  2.4*   --    --    < >   --    39824   --    --    --   92840*  802*   --    --   Undetected  Undetected   --   Undetected    < > = values in this interval not displayed.     CMV viral loads    Log IU/mL of CMVQNT   Date Value Ref Range Status   08/20/2018 2.8 (H) <2.1 [Log_IU]/mL Final   08/02/2018 6.5 (H) <2.1 [Log_IU]/mL Final   08/01/2018 3.8 (H) <2.1 [Log_IU]/mL Final   07/10/2018 4.3 (H) <2.1 [Log_IU]/mL Final   07/10/2018 4.0 (H) <2.1 [Log_IU]/mL Final   07/09/2018 2.4 (H) <2.1 [Log_IU]/mL Final   06/05/2018 Not Calculated <2.1 [Log_IU]/mL Final   05/16/2018 Not Calculated <2.1 [Log_IU]/mL Final   05/08/2018 Not Calculated <2.1 [Log_IU]/mL Final   04/19/2018 Not Calculated <2.1 [Log_IU]/mL Final   04/18/2018 Not Calculated <2.1 [Log_IU]/mL Final   04/16/2018 Not Calculated <2.1  [Log_IU]/mL Final   04/10/2018 Not Calculated <2.1 [Log_IU]/mL Final   04/09/2018 Not Calculated <2.1 [Log_IU]/mL Final   04/05/2018 Not Calculated <2.1 [Log_IU]/mL Final   04/03/2018 Not Calculated <2.1 [Log_IU]/mL Final   03/30/2018 Not Calculated <2.1 [Log_IU]/mL Final   03/26/2018 Not Calculated <2.1 [Log_IU]/mL Final   03/01/2018 Canceled, Test credited <2.1 [Log_IU]/mL Final     Comment:     Incorrectly ordered by PCU/Clinic  reordered as sendout test     03/01/2018 Canceled, Test credited <2.1 [Log_IU]/mL Final     Comment:     Incorrectly ordered by PCU/Clinic  reordered as sendout test       CMV DNA Quant (External)   Date Value Ref Range Status   07/20/2018 41462 (A) Undetected IU/mL Final   07/13/2018 802 (A) Undetected IU/mL Final   06/29/2018 Undetected Undetected IU/mL Final   06/19/2018 Undetected Undetected IU/mL Final   05/29/2018 Undetected Undetected IU/mL Final     CMV resistance testing  Recent Labs   Lab Test  08/03/18   0624   CMVCID  Sensitive   CMVFOS  Sensitive   CMVGAN  Sensitive     CMV Cidofovir Resist   Date Value Ref Range Status   08/03/2018 Sensitive not reported Final     CMV Foscarnet Resist   Date Value Ref Range Status   08/03/2018 Sensitive not reported Final     CMV Ganciclovir Resis   Date Value Ref Range Status   08/03/2018 Sensitive not reported Final     Comment:     (Note)  INTERPRETIVE INFORMATION: CMV Antiviral Resistance  Codons 457-630 of the UL97 gene and codons 393-1000 of the   UL54 gene are sequenced. Mutations associated with   resistance to ganciclovir, cidofovir, and foscarnet are   reported. Mutations in viral sub-populations below 20   percent of total may not be detected.  Test developed and characteristics determined by [employer]. See Compliance Statement B: [employer]  Performed by [employer],  [address] [facility], [facility] [place] [address] [phone]  [email or web address], Bradley Reyna MD, Lab. Director       EBV DNA Copies/mL   Date Value Ref Range  Status   08/01/2018 EBV DNA Not Detected EBVNEG^EBV DNA Not Detected [Copies]/mL Final     Hepatitis B Testing     Recent Labs   Lab Test  06/05/18   1029  03/01/18   0356  02/19/18   0759   AUSAB   --   0.41  0.64   HBCAB   --   Nonreactive  Nonreactive   HEPBANG  Nonreactive  Nonreactive  Nonreactive   HBQRES   --   HBV DNA Not Detected   --      Hepatitis C Antibody   Date Value Ref Range Status   02/19/2018 Nonreactive NR^Nonreactive Final     Comment:     Assay performance characteristics have not been established for newborns,   infants, and children       CMV Antibody IgG   Date Value Ref Range Status   03/01/2018 Canceled, Test credited 0.0 - 0.8 AI Final     Comment:     Test reordered as correct code  SEE CMV QUANTITATIVE PCR     03/01/2018 >8.0 (H) 0.0 - 0.8 AI Final     Comment:     Positive  Antibody index (AI) values reflect qualitative changes in antibody   concentration that cannot be directly associated with clinical condition or   disease state.     02/19/2018 >8.0 (H) 0.0 - 0.8 AI Final     Comment:     Positive  Antibody index (AI) values reflect qualitative changes in antibody   concentration that cannot be directly associated with clinical condition or   disease state.       Varicella Zoster Virus Antibody IgG   Date Value Ref Range Status   02/19/2018 3.8 (H) 0.0 - 0.8 AI Final     Comment:     Positive, suggests prev. exposure and probable immunity  Antibody index (AI) values reflect qualitative changes in antibody   concentration that cannot be directly associated with clinical condition or   disease state.       EBV Capsid Antibody IgG   Date Value Ref Range Status   03/01/2018 >8.0 (H) 0.0 - 0.8 AI Final     Comment:     Positive, suggests recent or past exposure  Antibody index (AI) values reflect qualitative changes in antibody   concentration that cannot be directly associated with clinical condition or   disease state.     02/19/2018 >8.0 (H) 0.0 - 0.8 AI Final     Comment:      Positive, suggests recent or past exposure  Antibody index (AI) values reflect qualitative changes in antibody   concentration that cannot be directly associated with clinical condition or   disease state.       Toxoplasma Antibody IgG   Date Value Ref Range Status   2018 <3.0 0.0 - 7.1 IU/mL Final     Comment:     Negative- Absence of detectable Toxoplasma gondii IgG antibodies. A negative   result does not rule out acute infection.  The magnitude of the measured result is not indicative of the amount of   antibody present. The concentrations of anti-Toxoplasma gondii IgG in a given   specimen determined with assays from different manufacturers can vary due to   differences in assay methods and reagent specificity.       Herpes Simplex Virus Type 1 IgG   Date Value Ref Range Status   2018 >8.0 (H) 0.0 - 0.8 AI Final     Comment:     Positive.  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  Antibody index (AI) values reflect qualitative changes in antibody   concentration that cannot be directly associated with clinical condition or   disease state.     2018 >8.0 (H) 0.0 - 0.8 AI Final     Comment:     Positive.  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  Antibody index (AI) values reflect qualitative changes in antibody   concentration that cannot be directly associated with clinical condition or   disease state.       Herpes Simplex Virus Type 2 IgG   Date Value Ref Range Status   2018 <0.2 0.0 - 0.8 AI Final     Comment:     No HSV-2 IgG antibodies detected.  Antibody index (AI) values reflect qualitative changes in antibody   concentration that cannot be directly associated with clinical condition or   disease state.     2018 <0.2 0.0 - 0.8 AI Final     Comment:     No HSV-2 IgG antibodies detected.  Antibody index (AI) values reflect qualitative changes in antibody   concentration that cannot be directly associated with clinical condition or   disease state.       Imagin/20 CXR images reviewed.  Small left effusion present. No infiltrates noted.     Results for orders placed or performed during the hospital encounter of 08/20/18   CT Abdomen Pelvis w/o Contrast    Narrative    Exam: CT abdomen and pelvis without contrast    Comparison: Chest radiograph 8/20/2018, CT chest 5/8/2018, CT CAP  3/8/2018    History: , Stable dilated loops of bowel in the upper abdomen to 4.5  cm. Patient is symptomatic with nausea and diarrhea.    Technique: CT of the abdomen and pelvis was obtained without  intravenous contrast. Coronal and sagittal reconstructions were  obtained and reviewed.    Findings:  Images are somewhat degraded by motion artifact.    Lower chest: Small bilateral pleural effusions. No pneumothorax.  Basilar groundglass opacity, greater on the left and the right, most  consistent with atelectasis. Stable linear fibrotic changes in the  lower lobes. Partially visualized median sternotomy.    Abdomen/pelvis: Hyperdense material in the stomach, likely represents  ingested material. Normal noncontrast appearance of the liver,  gallbladder, pancreas, spleen, adrenal glands, and kidneys. Prominent  loops of small bowel in the upper abdomen measuring up to 3.5 cm  without evidence of obstruction or transition point. Liquid stool in  the colon. The rectum is distended with air and measures up to 8.9 cm.  The bladder is partially distended and normal in appearance. No pelvic  masses. Scattered pelvic phleboliths. Normal caliber of the aorta.  Shotty retroperitoneal and mesenteric lymphadenopathy, similar in  appearance to CT dated 3/8/2018. No free intraperitoneal air. Trace  free fluid in the pelvis.    Bones and soft tissues: Scattered foci of subcutaneous free air in the  anterior abdominal wall with adjacent soft tissue nodularity, likely  related to medication injection. Mild degenerative changes of the  spine. No suspicious appearing bony lesions.      Impression    Impression:   1. Mildly dilated loops of  small bowel without evidence of  obstruction.   2. Small bilateral pleural effusions and associated bibasilar  atelectasis.    I have personally reviewed the examination and initial interpretation  and I agree with the findings.    JAMES LAI MD

## 2018-08-23 ENCOUNTER — MYC REFILL (OUTPATIENT)
Dept: TRANSPLANT | Facility: CLINIC | Age: 58
End: 2018-08-23

## 2018-08-23 DIAGNOSIS — Z94.2 S/P LUNG TRANSPLANT (H): ICD-10-CM

## 2018-08-24 ENCOUNTER — ANTICOAGULATION THERAPY VISIT (OUTPATIENT)
Dept: ANTICOAGULATION | Facility: CLINIC | Age: 58
End: 2018-08-24

## 2018-08-24 DIAGNOSIS — Z94.2 LUNG TRANSPLANT RECIPIENT (H): ICD-10-CM

## 2018-08-24 DIAGNOSIS — I82.629 DEEP VEIN THROMBOSIS (DVT) OF UPPER EXTREMITY, UNSPECIFIED CHRONICITY, UNSPECIFIED LATERALITY, UNSPECIFIED VEIN (H): ICD-10-CM

## 2018-08-24 LAB — INR PPP: 1.39

## 2018-08-24 NOTE — MR AVS SNAPSHOT
Kecia Blue   8/24/2018   Anticoagulation Therapy Visit    Description:  57 year old female   Provider:  Rufina Hanna, RN   Department:  [facility]           INR as of 8/24/2018     Today's INR 1.39!      Anticoagulation Summary as of 8/24/2018     INR goal 2.0-3.0   Today's INR 1.39!   Full warfarin instructions 8/24: 6 mg; 8/25: 6 mg; 8/26: 4 mg; Otherwise No maintenance plan   Next INR check 8/27/2018    Indications   Lung transplant recipient (H) [Z94.2]  Deep vein thrombosis (DVT) (H) [I82.409] [I82.409]         August 2018 Details    Sun Mon Tue Wed Thu Fri Sat        1               2               3               4                 5               6               7               8               9               10               11                 12               13               14               15               16               17               18                 19               20               21               22               23               24      6 mg   See details      25      6 mg           26      4 mg         27            28               29               30               31                 Date Details   08/24 This INR check       Date of next INR:  8/27/2018         How to take your warfarin dose     To take:  4 mg Take 4 of the 1 mg tablets.    To take:  6 mg Take 6 of the 1 mg tablets.

## 2018-08-24 NOTE — PROGRESS NOTES
ANTICOAGULATION FOLLOW-UP CLINIC VISIT    Patient Name:  Kecia Blue  Date:  8/24/2018  Contact Type:  Telephone    SUBJECTIVE:     Patient Findings     Positives No Problem Findings           OBJECTIVE    INR   Date Value Ref Range Status   08/24/2018 1.39  Final       ASSESSMENT / PLAN  INR assessment SUB    Recheck INR In: 3 DAYS    INR Location Outside lab      Anticoagulation Summary as of 8/24/2018     INR goal 2.0-3.0   Today's INR 1.39!   Warfarin maintenance plan No maintenance plan   Full warfarin instructions 8/24: 6 mg; 8/25: 6 mg; 8/26: 4 mg; Otherwise No maintenance plan   Next INR check 8/27/2018   Priority INR   Target end date     Indications   Lung transplant recipient (H) [Z94.2]  Deep vein thrombosis (DVT) (H) [I82.409] [I82.409]         Anticoagulation Episode Summary     INR check location Clinic Lab    Preferred lab     Send INR reminders to [facility]    Comments Extended duration orders until October per Magaly Castro RN- Provoked DVTHIPPA OK and  Verbal OK to speak with spouse and leave msg @ [phone].  labs @ [facility] starting ~ 5/25/18.  fax: [phone]. ph:[phone] BRONCH PLAN NOTE 6/6/18      Anticoagulation Care Providers     Provider Role Specialty Phone number    Ame Chow PA-C Responsible Pulmonary [phone]            See the Encounter Report to view Anticoagulation Flowsheet and Dosing Calendar (Go to Encounters tab in chart review, and find the Anticoagulation Therapy Visit)    Spoke with patient.    Rufina Hanna RN

## 2018-08-27 ENCOUNTER — ANTICOAGULATION THERAPY VISIT (OUTPATIENT)
Dept: ANTICOAGULATION | Facility: CLINIC | Age: 58
End: 2018-08-27

## 2018-08-27 DIAGNOSIS — Z94.2 LUNG TRANSPLANT RECIPIENT (H): ICD-10-CM

## 2018-08-27 DIAGNOSIS — I82.629 DEEP VEIN THROMBOSIS (DVT) OF UPPER EXTREMITY, UNSPECIFIED CHRONICITY, UNSPECIFIED LATERALITY, UNSPECIFIED VEIN (H): ICD-10-CM

## 2018-08-27 RX ORDER — TACROLIMUS 0.5 MG/1
0.5 CAPSULE ORAL EVERY MORNING
Qty: 30 CAPSULE | Refills: 11 | Status: SHIPPED | OUTPATIENT
Start: 2018-08-27 | End: 2018-10-12

## 2018-08-27 NOTE — PROGRESS NOTES
ANTICOAGULATION FOLLOW-UP CLINIC VISIT    Patient Name:  Kecia Blue  Date:  8/27/2018  Contact Type:  Telephone    SUBJECTIVE:     Patient Findings     Comments lovenox continues, 60 mg subcutaneous Q 12 hr - pt has been on this since her most recent bronchoscopy  Pt reports she may need a bronchoscopy again next week - she is not yet aware of any definite plans for this & will keep us informed           OBJECTIVE    INR   Date Value Ref Range Status   08/27/2018 1.51  Final       ASSESSMENT / PLAN  INR assessment SUB    Recheck INR In: 2 DAYS    INR Location Outside lab      Anticoagulation Summary as of 8/27/2018     INR goal 2.0-3.0   Today's INR 1.51!   Warfarin maintenance plan No maintenance plan   Full warfarin instructions 8/27: 7.5 mg; 8/28: 7 mg; Otherwise No maintenance plan   Next INR check 8/29/2018   Priority INR   Target end date     Indications   Lung transplant recipient (H) [Z94.2]  Deep vein thrombosis (DVT) (H) [I82.409] [I82.409]         Anticoagulation Episode Summary     INR check location Clinic Lab    Preferred lab     Send INR reminders to [facility]    Comments Extended duration orders until October per Magaly Castro RN- Provoked DVTHIPPA OK and  Verbal OK to speak with spouse and leave msg @ [phone].  labs @ [facility] starting ~ 5/25/18.  fax: [phone]. ph:[phone] BRONCH PLAN NOTE 6/6/18      Anticoagulation Care Providers     Provider Role Specialty Phone number    Ronalddat Ame Patel PA-C Responsible Pulmonary [phone]            See the Encounter Report to view Anticoagulation Flowsheet and Dosing Calendar (Go to Encounters tab in chart review, and find the Anticoagulation Therapy Visit)    Spoke with patient. Gave them their lab results and new warfarin recommendation.  No changes in health, medication, or diet. No missed doses, no falls. No signs or symptoms of bleed or clotting.    See also above notation     Judith Salazar RN

## 2018-08-27 NOTE — TELEPHONE ENCOUNTER
Message from [facility]:  Olesya Michael RN Mon Aug 27, 2018 1:45 PM        ----- Message -----   From: Kecia Blue   Sent: 8/23/2018 7:08 PM   To: Pulmonary Nurses-  Subject: Medication Renewal Request     Original authorizing provider: MD Kecia Best would like a refill of the following medications:  tacrolimus (GENERIC EQUIVALENT) 0.5 MG capsule [Nicho Hood MD]    Preferred pharmacy: [place] [address] [address]    Comment:

## 2018-08-27 NOTE — MR AVS SNAPSHOT
Kecia Blue   8/27/2018   Anticoagulation Therapy Visit    Description:  57 year old female   Provider:  Judith Salazar, RN   [facility]  [facility]           INR as of 8/27/2018     Today's INR 1.51!      Anticoagulation Summary as of 8/27/2018     INR goal 2.0-3.0   Today's INR 1.51!   Full warfarin instructions 8/27: 7.5 mg; 8/28: 7 mg; Otherwise No maintenance plan   Next INR check 8/29/2018    Indications   Lung transplant recipient (H) [Z94.2]  Deep vein thrombosis (DVT) (H) [I82.409] [I82.409]         August 2018 Details    Sun Mon Tue Wed Thu Fri Sat        1               2               3               4                 5               6               7               8               9               10               11                 12               13               14               15               16               17               18                 19               20               21               22               23               24               25                 26               27      7.5 mg   See details      28      7 mg         29            30               31                 Date Details   08/27 This INR check       Date of next INR:  8/29/2018         How to take your warfarin dose     To take:  7 mg Take 7 of the 1 mg tablets.    To take:  7.5 mg Take 7.5 of the 1 mg tablets.

## 2018-08-29 ENCOUNTER — TELEPHONE (OUTPATIENT)
Dept: PULMONOLOGY | Facility: CLINIC | Age: 58
End: 2018-08-29

## 2018-08-29 ENCOUNTER — ANTICOAGULATION THERAPY VISIT (OUTPATIENT)
Dept: ANTICOAGULATION | Facility: CLINIC | Age: 58
End: 2018-08-29

## 2018-08-29 ENCOUNTER — TRANSFERRED RECORDS (OUTPATIENT)
Dept: HEALTH INFORMATION MANAGEMENT | Facility: CLINIC | Age: 58
End: 2018-08-29

## 2018-08-29 DIAGNOSIS — I82.409 DEEP VEIN THROMBOSIS (DVT) (H): ICD-10-CM

## 2018-08-29 DIAGNOSIS — Z94.2 LUNG TRANSPLANT RECIPIENT (H): ICD-10-CM

## 2018-08-29 LAB — INR PPP: 2.41

## 2018-08-29 NOTE — TELEPHONE ENCOUNTER
Spoke with patient to schedule procedure with Dr. Alana Lin    Procedure was scheduled on 09/10 at [facility] OR  Patient will have H&P with Pulmonary, [facility]  Patient is aware a / is needed day of surgery.   Surgery letter was sent via [employer], patient has my direct contact information for any further questions.

## 2018-08-29 NOTE — PROGRESS NOTES
ANTICOAGULATION FOLLOW-UP CLINIC VISIT    Patient Name:  Kecia Blue  Date:  8/29/2018  Contact Type:  Telephone    SUBJECTIVE:     Patient Findings     Positives No Problem Findings           OBJECTIVE    INR   Date Value Ref Range Status   08/29/2018 2.41  Final       ASSESSMENT / PLAN  INR assessment THER    Recheck INR In: 2 DAYS    INR Location Outside lab      Anticoagulation Summary as of 8/29/2018     INR goal 2.0-3.0   Today's INR 2.41   Warfarin maintenance plan No maintenance plan   Full warfarin instructions 8/29: 6 mg; 8/30: 6 mg; Otherwise No maintenance plan   Next INR check 8/31/2018   Priority INR   Target end date     Indications   Lung transplant recipient (H) [Z94.2]  Deep vein thrombosis (DVT) (H) [I82.409] [I82.409]         Anticoagulation Episode Summary     INR check location Clinic Lab    Preferred lab     Send INR reminders to [facility]    Comments Extended duration orders until October per Magaly Castro RN- Provoked DVTHIPPA OK and  Verbal OK to speak with spouse and leave msg @ [phone].  labs @ [facility] starting ~ 5/25/18.  fax: [phone]. ph:[phone] BRONCH PLAN NOTE 6/6/18      Anticoagulation Care Providers     Provider Role Specialty Phone number    Ame Chow PA-C Responsible Pulmonary [phone]            See the Encounter Report to view Anticoagulation Flowsheet and Dosing Calendar (Go to Encounters tab in chart review, and find the Anticoagulation Therapy Visit)    Spoke with patient. Gave them their lab results and new warfarin recommendation.  No changes in health, medication, or diet. No missed doses, no falls. No signs or symptoms of bleed or clotting.  Patient may stop her Lovenox. She is now in her INR goal Range.  Her next INR will be on 8/31/18    Patient is scheduled for another Bronchoscopy on Monday 9/10/18.   She will need to bridge with Lovenox 60mgs every 12 hours. I gave her the following Bridging instructions.  (She has enough Lovenox syringes at home)    Wed    9/5  Hold Warfarin  Thurs   9/6  Hold Warfarin   Start Lovenox in PM  Fri        9/7  Hold Warfarin   Lovenox AM and PM  Sat       9/8  Hold Warfarin   Lovenox AM and PM  Sun     9/9    Hold Warfarin   Hold Lovenox  Mon     9/10  Day of procedure  Patient will ask provider about restarting warfarin and Lovenox.     Ramin Hines [facility]

## 2018-08-29 NOTE — MR AVS SNAPSHOT
Kecia Blue   8/29/2018   Anticoagulation Therapy Visit    Description:  57 year old female   Provider:  Ramin Hines [facility]   Department:  [facility]           INR as of 8/29/2018     Today's INR 2.41      Anticoagulation Summary as of 8/29/2018     INR goal 2.0-3.0   Today's INR 2.41   Full warfarin instructions 8/29: 6 mg; 8/30: 6 mg; Otherwise No maintenance plan   Next INR check 8/31/2018    Indications   Lung transplant recipient (H) [Z94.2]  Deep vein thrombosis (DVT) (H) [I82.409] [I82.409]         August 2018 Details    Sun Mon Tue Wed Thu Fri Sat        1               2               3               4                 5               6               7               8               9               10               11                 12               13               14               15               16               17               18                 19               20               21               22               23               24               25                 26               27               28               29      6 mg   See details      30      6 mg         31              Date Details   08/29 This INR check       Date of next INR:  8/31/2018         How to take your warfarin dose     To take:  6 mg Take 6 of the 1 mg tablets.

## 2018-08-30 ENCOUNTER — TELEPHONE (OUTPATIENT)
Dept: TRANSPLANT | Facility: CLINIC | Age: 58
End: 2018-08-30

## 2018-08-30 DIAGNOSIS — Z79.899 ENCOUNTER FOR LONG-TERM (CURRENT) USE OF HIGH-RISK MEDICATION: ICD-10-CM

## 2018-08-30 DIAGNOSIS — B25.9 CMV (CYTOMEGALOVIRUS) (H): ICD-10-CM

## 2018-08-30 DIAGNOSIS — Z94.2 LUNG TRANSPLANT RECIPIENT (H): Primary | ICD-10-CM

## 2018-08-30 LAB
BACTERIA SPEC CULT: NORMAL
FUNGUS SPEC CULT: NORMAL
SPECIMEN SOURCE: NORMAL
SPECIMEN SOURCE: NORMAL

## 2018-08-30 NOTE — TELEPHONE ENCOUNTER
Kecia will be seen Tuesday 9/4/18 at 1:40pm with Dr Macias for pre op appt.    Will ask Farnaz to confirm with patient.

## 2018-09-04 ENCOUNTER — RESULTS ONLY (OUTPATIENT)
Dept: OTHER | Facility: CLINIC | Age: 58
End: 2018-09-04

## 2018-09-04 ENCOUNTER — ANTICOAGULATION THERAPY VISIT (OUTPATIENT)
Dept: ANTICOAGULATION | Facility: CLINIC | Age: 58
End: 2018-09-04

## 2018-09-04 ENCOUNTER — RADIANT APPOINTMENT (OUTPATIENT)
Dept: GENERAL RADIOLOGY | Facility: CLINIC | Age: 58
End: 2018-09-04
Attending: INTERNAL MEDICINE
Payer: COMMERCIAL

## 2018-09-04 ENCOUNTER — OFFICE VISIT (OUTPATIENT)
Dept: TRANSPLANT | Facility: CLINIC | Age: 58
End: 2018-09-04
Attending: INTERNAL MEDICINE
Payer: COMMERCIAL

## 2018-09-04 VITALS
HEART RATE: 91 BPM | WEIGHT: 120.3 LBS | TEMPERATURE: 97.5 F | SYSTOLIC BLOOD PRESSURE: 131 MMHG | DIASTOLIC BLOOD PRESSURE: 80 MMHG | BODY MASS INDEX: 20.65 KG/M2 | OXYGEN SATURATION: 100 %

## 2018-09-04 DIAGNOSIS — Z79.899 ENCOUNTER FOR LONG-TERM (CURRENT) USE OF HIGH-RISK MEDICATION: ICD-10-CM

## 2018-09-04 DIAGNOSIS — Z94.2 LUNG TRANSPLANT RECIPIENT (H): ICD-10-CM

## 2018-09-04 DIAGNOSIS — B25.9 CMV (CYTOMEGALOVIRUS) (H): ICD-10-CM

## 2018-09-04 DIAGNOSIS — Z94.2 LUNG REPLACED BY TRANSPLANT (H): ICD-10-CM

## 2018-09-04 DIAGNOSIS — J84.9 ILD (INTERSTITIAL LUNG DISEASE) (H): ICD-10-CM

## 2018-09-04 DIAGNOSIS — I82.409 DEEP VEIN THROMBOSIS (DVT) (H): ICD-10-CM

## 2018-09-04 DIAGNOSIS — K21.9 GASTROESOPHAGEAL REFLUX DISEASE WITHOUT ESOPHAGITIS: ICD-10-CM

## 2018-09-04 DIAGNOSIS — Z94.2 LUNG TRANSPLANT RECIPIENT (H): Primary | ICD-10-CM

## 2018-09-04 LAB
ANION GAP SERPL CALCULATED.3IONS-SCNC: 10 MMOL/L (ref 3–14)
BUN SERPL-MCNC: 27 MG/DL (ref 7–30)
CALCIUM SERPL-MCNC: 8.8 MG/DL (ref 8.5–10.1)
CHLORIDE SERPL-SCNC: 103 MMOL/L (ref 94–109)
CO2 SERPL-SCNC: 22 MMOL/L (ref 20–32)
CREAT SERPL-MCNC: 1.22 MG/DL (ref 0.52–1.04)
ERYTHROCYTE [DISTWIDTH] IN BLOOD BY AUTOMATED COUNT: 15.9 % (ref 10–15)
EXPTIME-PRE: 6.72 SEC
FEF2575-%PRED-PRE: 35 %
FEF2575-PRE: 0.85 L/SEC
FEF2575-PRED: 2.44 L/SEC
FEFMAX-%PRED-PRE: 53 %
FEFMAX-PRE: 3.47 L/SEC
FEFMAX-PRED: 6.43 L/SEC
FEV1-%PRED-PRE: 49 %
FEV1-PRE: 1.27 L
FEV1FEV6-PRE: 69 %
FEV1FEV6-PRED: 81 %
FEV1FVC-PRE: 67 %
FEV1FVC-PRED: 79 %
FIFMAX-PRE: 2.59 L/SEC
FVC-%PRED-PRE: 58 %
FVC-PRE: 1.89 L
FVC-PRED: 3.24 L
GFR SERPL CREATININE-BSD FRML MDRD: 46 ML/MIN/1.7M2
GLUCOSE SERPL-MCNC: 117 MG/DL (ref 70–99)
HCT VFR BLD AUTO: 25.6 % (ref 35–47)
HGB BLD-MCNC: 7.6 G/DL (ref 11.7–15.7)
INR PPP: 2.06 (ref 0.86–1.14)
MAGNESIUM SERPL-MCNC: 1.7 MG/DL (ref 1.6–2.3)
MCH RBC QN AUTO: 31.3 PG (ref 26.5–33)
MCHC RBC AUTO-ENTMCNC: 29.7 G/DL (ref 31.5–36.5)
MCV RBC AUTO: 105 FL (ref 78–100)
MYCOBACTERIUM SPEC CULT: NORMAL
PLATELET # BLD AUTO: 387 10E9/L (ref 150–450)
POTASSIUM SERPL-SCNC: 4.3 MMOL/L (ref 3.4–5.3)
RBC # BLD AUTO: 2.43 10E12/L (ref 3.8–5.2)
SODIUM SERPL-SCNC: 134 MMOL/L (ref 133–144)
SPECIMEN SOURCE: NORMAL
SPECIMEN SOURCE: NORMAL
WBC # BLD AUTO: 10.5 10E9/L (ref 4–11)

## 2018-09-04 PROCEDURE — 85027 COMPLETE CBC AUTOMATED: CPT | Performed by: INTERNAL MEDICINE

## 2018-09-04 PROCEDURE — 36415 COLL VENOUS BLD VENIPUNCTURE: CPT | Performed by: INTERNAL MEDICINE

## 2018-09-04 PROCEDURE — 83735 ASSAY OF MAGNESIUM: CPT | Performed by: INTERNAL MEDICINE

## 2018-09-04 PROCEDURE — 80048 BASIC METABOLIC PNL TOTAL CA: CPT | Performed by: INTERNAL MEDICINE

## 2018-09-04 PROCEDURE — 86832 HLA CLASS I HIGH DEFIN QUAL: CPT | Performed by: INTERNAL MEDICINE

## 2018-09-04 PROCEDURE — 86833 HLA CLASS II HIGH DEFIN QUAL: CPT | Performed by: INTERNAL MEDICINE

## 2018-09-04 PROCEDURE — G0463 HOSPITAL OUTPT CLINIC VISIT: HCPCS | Mod: ZF

## 2018-09-04 PROCEDURE — 85610 PROTHROMBIN TIME: CPT | Performed by: INTERNAL MEDICINE

## 2018-09-04 ASSESSMENT — PAIN SCALES - GENERAL: PAINLEVEL: NO PAIN (0)

## 2018-09-04 NOTE — PROGRESS NOTES
"Reason for Visit  Kecia Blue is a 57-year-old female who is being seen for No chief complaint on file.    Lung TX HPI  Kecia Blue is a 57-year-old female with a history of dermatomyositis, seronegative rheumatoid arthritis, interstitial lung disease, and pulmonary hypertension who was admitted to the hospital with acute worsening of chronic hypoxic respiratory failure in 02/2018 and progressed to [facility] for right heart failure and subsequently underwent bilateral sequential lung transplant on 03/01/2018.  Her post-transplant course was generally uncomplicated; however, she needed pleural effusion tap on the right side later.    Interval HPI   The patient is not \"doing so great\". She has a terrible cough that she has had \"forever\", but exacerbated over the last two weeks. The cough starts mildly early in the day and then will progress to somewhat productive cough that produces a little amount of white sputum. She does endorse wheezing, which is worse at night. To alleviate or improve her wheezing, she will cough or use her inhaler. She has to use her inhaler a couple times a day. Typically, when she coughs, she does have to blow her nose and this is normal for her.   The patient does have \"a little bit\" of shortness of breath while active and this has gotten worse in the last two weeks as well. Shortness of breath usually occurs while walking or doing exercises. She is doing less exercises than two weeks ago. The patient notes that she did pull something in her knee at her last rehab session. No lower extremity edema or shortness of breath while lying down.   The patient can sleep; however, she does wake up a couple of times a night secondary to wheezing or having to use the bathroom. She does not wake up due to frequent coughing. Her appetite is still low, but no difference from before the transplant. She just has a \"full\" sensation; not because of nausea. No heartburn or bloating.   The patient does " "experience chest pain that hurts across the upper chest, mostly just right of the mediastinum. She was unable to do the last biopsy due to sutures. The chest pain has gotten worse in the last two weeks and coughing also exacerbates her chest pain. She describes the chest pain as tight - \"everything is shrunk in\". No pain with palpation. Sometimes the chest is numb, but no shooting pain or pain below the breasts. No rashes noted about the chest; however, she does have a little \"bump\" - about the size of pea - about the incision scar. Her incision scar is not sensitive. Taking Tylenol 1,000 mg TID does not significantly improve her chest pain; tries to only do 1,000 mg BID. Chest pain does not limit breathing test.   The patient has had loose stools for a couple of days but, now, back to normal. No rashes or arthralgia. No unusual weakness, numbness, or tingling. No fevers, chills, or sweats.    Current Outpatient Prescriptions   Medication     ACETAMINOPHEN PO     albuterol (PROAIR HFA/PROVENTIL HFA/VENTOLIN HFA) 108 (90 Base) MCG/ACT Inhaler     calcium-vitamin D (CALTRATE) 600-400 MG-UNIT per tablet     enoxaparin (LOVENOX) 60 MG/0.6ML injection     levalbuterol (XOPENEX HFA) 45 MCG/ACT Inhaler     magnesium oxide (MAG-OX) 400 MG tablet     metoclopramide (REGLAN) 5 MG tablet     metoprolol tartrate (LOPRESSOR) 25 MG tablet     multivitamin, therapeutic with minerals (THERA-VIT-M) TABS tablet     mycophenolate (GENERIC EQUIVALENT) 250 MG capsule     nystatin (MYCOSTATIN) 810800 UNIT/ML suspension     pantoprazole (PROTONIX) 40 MG EC tablet     pentoxifylline (TRENTAL) 400 MG CR tablet     predniSONE (DELTASONE) 2.5 MG tablet     predniSONE (DELTASONE) 5 MG tablet     sulfamethoxazole-trimethoprim (BACTRIM DS/SEPTRA DS) 800-160 MG per tablet     tacrolimus (GENERIC EQUIVALENT) 0.5 MG capsule     tacrolimus (GENERIC EQUIVALENT) 1 MG capsule     valGANciclovir (VALCYTE) 450 MG tablet     warfarin (COUMADIN) 1 MG " tablet     No current facility-administered medications for this visit.      No Known Allergies    Past Medical History:   Diagnosis Date     Antisynthetase syndrome (H) 2014     Chronic cough      Dehydration 8/1/2018     Dermatomyositis (H)      Dysplasia of cervix, low grade (ESTRADA 1)      ILD (interstitial lung disease) (H)      Osteopenia      PONV (postoperative nausea and vomiting)      Pulmonary hypertension      Raynaud's disease      Seronegative rheumatoid arthritis (H)      Past Surgical History:   Procedure Laterality Date     BRONCHOSCOPY (RIGID OR FLEXIBLE), DIAGNOSTIC N/A 4/10/2018    Procedure: COMBINED BRONCHOSCOPY (RIGID OR FLEXIBLE), LAVAGE;;  Surgeon: Mariposa Donohue MD;  Location: [facility]     BRONCHOSCOPY FLEXIBLE N/A 3/13/2018    Procedure: BRONCHOSCOPY FLEXIBLE;  Flexible Bronchoscopy ;  Surgeon: Gissell Sanchez MD;  Location: [facility]     BRONCHOSCOPY FLEXIBLE N/A 5/9/2018    Procedure: BRONCHOSCOPY FLEXIBLE;;  Surgeon: Wilber Lin MD;  Location: [facility]     BRONCHOSCOPY RIGID N/A 6/6/2018    Procedure: BRONCHOSCOPY RIGID;;  Surgeon: Lopez Macias MD;  Location: [facility]     BRONCHOSCOPY, DILATE BRONCHUS, STENT BRONCHUS, COMBINED N/A 6/11/2018    Procedure: COMBINED BRONCHOSCOPY, DILATE BRONCHUS, STENT BRONCHUS;  Flexible Bronchoscopy, Balloon Dilation, Bronchial Washings;  Surgeon: Wilber Lin MD;  Location: [facility]     BRONCHOSCOPY, DILATE BRONCHUS, STENT BRONCHUS, COMBINED Right 7/10/2018    Procedure: COMBINED BRONCHOSCOPY, DILATE BRONCHUS, STENT BRONCHUS;  Flexible Bronchoscopy, Balloon Dilation, Bronchial Washings  ;  Surgeon: Wilber Lin MD;  Location: [facility]     BRONCHOSCOPY, DILATE BRONCHUS, STENT BRONCHUS, COMBINED N/A 8/2/2018    Procedure: COMBINED BRONCHOSCOPY, DILATE BRONCHUS, STENT BRONCHUS;  Flexible Bronchoscopy, Bronchial Washings, Balloon Dilation;  Surgeon: Wilber Lin MD;  Location: [facility]     BRONCHOSCOPY, DILATE BRONCHUS,  STENT BRONCHUS, COMBINED N/A 8/20/2018    Procedure: COMBINED BRONCHOSCOPY, DILATE BRONCHUS, STENT BRONCHUS;  Flexible Bronchoscopy, Balloon Dilation;  Surgeon: Wilber Lin MD;  Location:  OR     ENT SURGERY      tonsillectomy as a child     INSERT EXTRACORPORAL MEMBRANE OXYGENATOR ADULT (OUTSIDE OR) N/A 2/27/2018    Procedure: INSERT EXTRACORPORAL MEMBRANE OXYGENATOR ADULT (OUTSIDE OR);  INSERT EXTRACORPORAL MEMBRANE OXYGENATOR ADULT (OUTSIDE OR) ;  Surgeon: Toby Hernandez MD;  Location:  OR     no prior surgery       REMOVE EXTRACORPORAL MEMBRANE OXYGENATOR ADULT N/A 3/3/2018    Procedure: REMOVE EXTRACORPORAL MEMBRANE OXYGENATOR ADULT;  Removal of Right Femoral Venous and Right Axillary Arterial Extracorporeal Membrane Oxygenator;  Surgeon: Toby Hernandez MD;  Location:  OR     TRANSPLANT LUNG RECIPIENT SINGLE X2 Bilateral 3/1/2018    Procedure: TRANSPLANT LUNG RECIPIENT SINGLE X2;  Median Sternotomy, Extracorporeal Membrane Oxygenator, bilateral sequential lung transplant;  Surgeon: Toby Hernandez MD;  Location:  OR     Social History     Social History     Marital status:      Spouse name: N/A     Number of children: N/A     Years of education: N/A     Occupational History     Not on file.     Social History Main Topics     Smoking status: Never Smoker     Smokeless tobacco: Never Used     Alcohol use No     Drug use: No     Sexual activity: Not on file     Other Topics Concern     Parent/Sibling W/ Cabg, Mi Or Angioplasty Before 65f 55m? No     Social History Narrative     Family History   Problem Relation Age of Onset     Hypertension Mother      Arthritis Mother      Pancreatic Cancer Father      Diabetes Father      Pulmonary ROS  Constitutional- Negative  Eyes- Negative  Ear, nose and throat- Negative  Cardiac- Negative  Pulm- See HPI  GI- Positive. Did have a couple of days of loose stools, but now has resolved.  Genitourinary-  Negative  Musculoskeletal- Positive. Chest pain across the upper chest, mostly just right of mediastinum.  Neurology- Negative  Dermatology- Negative  Endocrine- Negative  Lymphatic- Negative  Psychiatry- Negative  A complete ROS was otherwise negative except as noted in the HPI.    LMP 06/07/2014 (Exact Date)  Physical Exam  GENERAL APPEARANCE: Alert, Oriented x3. Not in distress.  EYES: PERRL, EOMI  HENT: Nasal mucosa with no hyperemia and no edema, no nasal polyps.  MOUTH: Oral mucosa is moist, without any lesions, no tonsillar enlargement, no oropharyngeal exudate.  NECK: Supple, no masses, no thyromegaly.  LYMPHATICS: No significant axillary, cervical, or supraclavicular nodes.  RESP: Normal percussion, good air flow throughout Left lung. Rt. lung insp wheeze heard and poor exhalation.  CV: Normal S1, S2, regular rhythm, normal rate, no rub, no murmur,  no gallop. Trace right lower extremity edema, no left lower extremity edema.  ABDOMEN: Bowel sounds normal, soft, nontender, no HSM or masses.  MS: Extremities normal, no clubbing, no cyanosis.   SKIN: No rash on limited exam.  NEURO: Mentation intact, speech normal, normal strength and tone, normal gait, and stance.  PSYCH: Mentation appears normal, and affect normal/bright.    Recent Results (from the past 168 hour(s))   INR    Collection Time: 08/29/18 12:00 AM   Result Value Ref Range    INR 2.41    Basic metabolic panel    Collection Time: 09/04/18 10:52 AM   Result Value Ref Range    Sodium 134 133 - 144 mmol/L    Potassium 4.3 3.4 - 5.3 mmol/L    Chloride 103 94 - 109 mmol/L    Carbon Dioxide 22 20 - 32 mmol/L    Anion Gap 10 3 - 14 mmol/L    Glucose 117 (H) 70 - 99 mg/dL    Urea Nitrogen 27 7 - 30 mg/dL    Creatinine 1.22 (H) 0.52 - 1.04 mg/dL    GFR Estimate 46 (L) >60 mL/min/1.7m2    GFR Estimate If Black 55 (L) >60 mL/min/1.7m2    Calcium 8.8 8.5 - 10.1 mg/dL   Magnesium    Collection Time: 09/04/18 10:52 AM   Result Value Ref Range    Magnesium 1.7  1.6 - 2.3 mg/dL   CBC with platelets    Collection Time: 09/04/18 10:52 AM   Result Value Ref Range    WBC 10.5 4.0 - 11.0 10e9/L    RBC Count 2.43 (L) 3.8 - 5.2 10e12/L    Hemoglobin 7.6 (L) 11.7 - 15.7 g/dL    Hematocrit 25.6 (L) 35.0 - 47.0 %     (H) 78 - 100 fl    MCH 31.3 26.5 - 33.0 pg    MCHC 29.7 (L) 31.5 - 36.5 g/dL    RDW 15.9 (H) 10.0 - 15.0 %    Platelet Count 387 150 - 450 10e9/L   INR    Collection Time: 09/04/18 10:52 AM   Result Value Ref Range    INR 2.06 (H) 0.86 - 1.14        Results as noted above.    PFT Interpretation:  Severe obstructive ventilatory defect along with severe restriction.  FEV1 is stable.  This is post tx best.   Valid Maneuver      CXR (9/4/2018), personally reviewed by me: The Lung fields are clear. No effusion. Cardiac silhouette is wnl.      Assessment and Plan: Kecia Blue is a 57-year-old female with a history of dermatomyositis, seronegative rheumatoid arthritis, interstitial lung disease, and pulmonary hypertension who was admitted to the hospital with acute worsening of chronic hypoxic respiratory failure in 02/2018 and progressed to [facility] for right heart failure and subsequently underwent bilateral sequential lung transplant on 03/01/2018. Her post-transplant course was generally uncomplicated; however, she needed pleural effusion tap on the right side later.     # Bilateral Lung Transplant: She severe restriction with low PFT's. Etiology unclear - ?chest wall restriction. Possible diaphram weakness/paralysis. Deconditioning might be playing a role.  Current IS regimen:   - Tacrolimus with a goal of 10-15 nanograms/mL, mycophenolate 1500 mg twice a day and prednisone taper per protocol.    DSA positive since 5/8/18 - it is unchanged on 5/16/18. It is DQB7.  9/4/2018: FEV1 is stable. CXR is stable. Sx are slightly worse. Lung exam consistent with worsening narrowing on Rt main stem.    - No changes in IS regimen. Has had TBBx only once since ltx and it was  on 4/2018.      # Right main bronchial stenosis s/p dilatation: Last Dilation on 8/20, next scheduled in one week. Still a good candidate for the dilation and possible stenting.  9/4/2018: Consider doing TBBx during the procedure, if feasible.  - Continue pentoxyfilline.    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 On Valcyte 450mg/day. Last CMV was better.  H/o C. diff colitis: Finished a 10day course of Oral Vancomycin.  High risk donor: Will need labs in 12 months.    # Hypomagnesemia: The patient remains on magnesium replacement. Her magnesium level today is acceptable.    # Provoked Left Upper Extremity Clot and Right IJ Clot (3/7/18): The plan was 3 months. Repeat US on 4/16 with decreased subclavian thrombus and resolved axillary thrombus, but new (vs previously obscured) right internal jugular clot. On warfarin, goal INR 2 - 3. Plan to continue anticoag till mid October.    # Dermatomyositis with Raynaud's Phenomenon: Followed by rheumatology.  - Myositis panel is positive.      # Hoarse Voice: She does complain of some hoarseness and is scheduled to see ENT, has vocal cord polyp seen at there last bronch.      # CKD stag III: Cr has increased again.   - Will follow for now.      # Anemia: with hgb of 7.6 down from baseline 8-9s. No s/s of bleeding. Received 1 U PRBC during admission, suspect possible occult bleeding from C diff. Hgb is stable at 7.5. Iron panel, folate/B12 WNL.  - Monitor      # Nausea and early satiety: Worsened with the Bactrim. Wants to eat, but gets full quickly. No epigastric pain, but notes increased cough and rhinorrhea. CXR demonstrates ongoing dilated loops of bowel, stable from prior. CT abd pelvis on 8/20/18 mildly dilated loops of SB.  9/4/2018: Wean reglan to BID and then to off if possible.  Can use it on a as needed basis. She is unsure if reglan helped or not.   - cont pantoprazole to 40 mg BID  - Gastroenterology referral pending on 12/2018.      # HTN: Well controlled on Metoprolol 25mg BID.      RTC in 3 to 4 weeks with labs including spirometry and xray due to drop in FEV1.      Scribe Disclosure:   I, Jacinto Duong, am serving as a scribe; to document services personally performed by Tarik Christensen MD based on data collection and the provider's statements to me.     Provider Disclosure:  I agree with above History, Review of Systems, Physical exam and Plan. I have reviewed the content of the documentation and have edited it as needed. I have personally performed the services documented here and the documentation accurately represents those services and the decisions I have made.      Electronically signed by:  Tarik Christensen MD.

## 2018-09-04 NOTE — MR AVS SNAPSHOT
Kecia Blue   9/4/2018   Anticoagulation Therapy Visit    Description:  57 year old female   Provider:  Katie Bush, RN   Department:  [facility]           INR as of 9/4/2018     Today's INR 2.06      Anticoagulation Summary as of 9/4/2018     INR goal 2.0-3.0   Today's INR 2.06   Full warfarin instructions 9/4: 6 mg; 9/5: Hold; 9/6: Hold; 9/7: Hold; 9/8: Hold; 9/9: Hold; Otherwise No maintenance plan   Next INR check 9/10/2018    Indications   Lung transplant recipient (H) [Z94.2]  Deep vein thrombosis (DVT) (H) [I82.409] [I82.409]         September 2018 Details    Sun Mon Tue Wed Thu Fri Sat           1                 2               3               4      6 mg   See details      5      Hold         6      Hold         7      Hold         8      Hold           9      Hold         10            11               12               13               14               15                 16               17               18               19               20               21               22                 23               24               25               26               27               28               29                 30                      Date Details   09/04 This INR check       Date of next INR:  9/10/2018         How to take your warfarin dose     To take:  6 mg Take 6 of the 1 mg tablets.    Hold Do not take your warfarin dose. See the Details table to the right for additional instructions.

## 2018-09-04 NOTE — LETTER
"9/4/2018      RE: Kecia Blue  [address] [place]  [facility] [id number]       Reason for Visit  Kecia Blue is a 57-year-old female who is being seen for No chief complaint on file.    Lung TX HPI  Kecia Blue is a 57-year-old female with a history of dermatomyositis, seronegative rheumatoid arthritis, interstitial lung disease, and pulmonary hypertension who was admitted to the hospital with acute worsening of chronic hypoxic respiratory failure in 02/2018 and progressed [facility] for right heart failure and subsequently underwent bilateral sequential lung transplant on 03/01/2018.  Her post-transplant course was generally uncomplicated; however, she needed pleural effusion tap on the right side later.    Interval HPI   The patient is not \"doing so great\". She has a terrible cough that she has had \"forever\", but exacerbated over the last two weeks. The cough starts mildly early in the day and then will progress to somewhat productive cough that produces a little amount of white sputum. She does endorse wheezing, which is worse at night. To alleviate or improve her wheezing, she will cough or use her inhaler. She has to use her inhaler a couple times a day. Typically, when she coughs, she does have to blow her nose and this is normal for her.   The patient does have \"a little bit\" of shortness of breath while active and this has gotten worse in the last two weeks as well. Shortness of breath usually occurs while walking or doing exercises. She is doing less exercises than two weeks ago. The patient notes that she did pull something in her knee at her last rehab session. No lower extremity edema or shortness of breath while lying down.   The patient can sleep; however, she does wake up a couple of times a night secondary to wheezing or having to use the bathroom. She does not wake up due to frequent coughing. Her appetite is still low, but no difference from before the transplant. She just " "has a \"full\" sensation; not because of nausea. No heartburn or bloating.   The patient does experience chest pain that hurts across the upper chest, mostly just right of the mediastinum. She was unable to do the last biopsy due to sutures. The chest pain has gotten worse in the last two weeks and coughing also exacerbates her chest pain. She describes the chest pain as tight - \"everything is shrunk in\". No pain with palpation. Sometimes the chest is numb, but no shooting pain or pain below the breasts. No rashes noted about the chest; however, she does have a little \"bump\" - about the size of pea - about the incision scar. Her incision scar is not sensitive. Taking Tylenol 1,000 mg TID does not significantly improve her chest pain; tries to only do 1,000 mg BID. Chest pain does not limit breathing test.   The patient has had loose stools for a couple of days but, now, back to normal. No rashes or arthralgia. No unusual weakness, numbness, or tingling. No fevers, chills, or sweats.    Current Outpatient Prescriptions   Medication     ACETAMINOPHEN PO     albuterol (PROAIR HFA/PROVENTIL HFA/VENTOLIN HFA) 108 (90 Base) MCG/ACT Inhaler     calcium-vitamin D (CALTRATE) 600-400 MG-UNIT per tablet     enoxaparin (LOVENOX) 60 MG/0.6ML injection     levalbuterol (XOPENEX HFA) 45 MCG/ACT Inhaler     magnesium oxide (MAG-OX) 400 MG tablet     metoclopramide (REGLAN) 5 MG tablet     metoprolol tartrate (LOPRESSOR) 25 MG tablet     multivitamin, therapeutic with minerals (THERA-VIT-M) TABS tablet     mycophenolate (GENERIC EQUIVALENT) 250 MG capsule     nystatin (MYCOSTATIN) 949488 UNIT/ML suspension     pantoprazole (PROTONIX) 40 MG EC tablet     pentoxifylline (TRENTAL) 400 MG CR tablet     predniSONE (DELTASONE) 2.5 MG tablet     predniSONE (DELTASONE) 5 MG tablet     sulfamethoxazole-trimethoprim (BACTRIM DS/SEPTRA DS) 800-160 MG per tablet     tacrolimus (GENERIC EQUIVALENT) 0.5 MG capsule     tacrolimus (GENERIC " EQUIVALENT) 1 MG capsule     valGANciclovir (VALCYTE) 450 MG tablet     warfarin (COUMADIN) 1 MG tablet     No current facility-administered medications for this visit.      No Known Allergies    Past Medical History:   Diagnosis Date     Antisynthetase syndrome (H) 2014     Chronic cough      Dehydration 8/1/2018     Dermatomyositis (H)      Dysplasia of cervix, low grade (ESTRADA 1)      ILD (interstitial lung disease) (H)      Osteopenia      PONV (postoperative nausea and vomiting)      Pulmonary hypertension      Raynaud's disease      Seronegative rheumatoid arthritis (H)      Past Surgical History:   Procedure Laterality Date     BRONCHOSCOPY (RIGID OR FLEXIBLE), DIAGNOSTIC N/A 4/10/2018    Procedure: COMBINED BRONCHOSCOPY (RIGID OR FLEXIBLE), LAVAGE;;  Surgeon: Mariposa Donohue MD;  Location: [facility]     BRONCHOSCOPY FLEXIBLE N/A 3/13/2018    Procedure: BRONCHOSCOPY FLEXIBLE;  Flexible Bronchoscopy ;  Surgeon: Gissell Sanchez MD;  Location: [facility]     BRONCHOSCOPY FLEXIBLE N/A 5/9/2018    Procedure: BRONCHOSCOPY FLEXIBLE;;  Surgeon: Wilber Lin MD;  Location: [facility]     BRONCHOSCOPY RIGID N/A 6/6/2018    Procedure: BRONCHOSCOPY RIGID;;  Surgeon: Lopez Macias MD;  Location: [facility]     BRONCHOSCOPY, DILATE BRONCHUS, STENT BRONCHUS, COMBINED N/A 6/11/2018    Procedure: COMBINED BRONCHOSCOPY, DILATE BRONCHUS, STENT BRONCHUS;  Flexible Bronchoscopy, Balloon Dilation, Bronchial Washings;  Surgeon: Wilber Lin MD;  [facility]     BRONCHOSCOPY, DILATE BRONCHUS, STENT BRONCHUS, COMBINED Right 7/10/2018    Procedure: COMBINED BRONCHOSCOPY, DILATE BRONCHUS, STENT BRONCHUS;  Flexible Bronchoscopy, Balloon Dilation, Bronchial Washings  ;  Surgeon: Wilber Lin MD;  [facility]     BRONCHOSCOPY, DILATE BRONCHUS, STENT BRONCHUS, COMBINED N/A 8/2/2018    Procedure: COMBINED BRONCHOSCOPY, DILATE BRONCHUS, STENT BRONCHUS;  Flexible Bronchoscopy, Bronchial Washings, Balloon  Dilation;  Surgeon: Wilber Lin MD;  [facility]     BRONCHOSCOPY, DILATE BRONCHUS, STENT BRONCHUS, COMBINED N/A 8/20/2018    Procedure: COMBINED BRONCHOSCOPY, DILATE BRONCHUS, STENT BRONCHUS;  Flexible Bronchoscopy, Balloon Dilation;  Surgeon: Wilber Lin MD;  Location:  OR     ENT SURGERY      tonsillectomy as a child     INSERT EXTRACORPORAL MEMBRANE OXYGENATOR ADULT (OUTSIDE OR) N/A 2/27/2018    Procedure: INSERT EXTRACORPORAL MEMBRANE OXYGENATOR ADULT (OUTSIDE OR);  INSERT EXTRACORPORAL MEMBRANE OXYGENATOR ADULT (OUTSIDE OR) ;  Surgeon: Toby Hernandez MD;  [facility]     no prior surgery       REMOVE EXTRACORPORAL MEMBRANE OXYGENATOR ADULT N/A 3/3/2018    Procedure: REMOVE EXTRACORPORAL MEMBRANE OXYGENATOR ADULT;  Removal of Right Femoral Venous and Right Axillary Arterial Extracorporeal Membrane Oxygenator;  Surgeon: Toby Hernandez MD;  Location:  OR     TRANSPLANT LUNG RECIPIENT SINGLE X2 Bilateral 3/1/2018    Procedure: TRANSPLANT LUNG RECIPIENT SINGLE X2;  Median Sternotomy, Extracorporeal Membrane Oxygenator, bilateral sequential lung transplant;  Surgeon: Toby Hernandez MD;  Location:  OR     Social History     Social History     Marital status:      Spouse name: N/A     Number of children: N/A     Years of education: N/A     Occupational History     Not on file.     Social History Main Topics     Smoking status: Never Smoker     Smokeless tobacco: Never Used     Alcohol use No     Drug use: No     Sexual activity: Not on file     Other Topics Concern     Parent/Sibling W/ Cabg, Mi Or Angioplasty Before 65f 55m? No     Social History Narrative     Family History   Problem Relation Age of Onset     Hypertension Mother      Arthritis Mother      Pancreatic Cancer Father      Diabetes Father      Pulmonary ROS  Constitutional- Negative  Eyes- Negative  Ear, nose and throat- Negative  Cardiac- Negative  Pulm- See HPI  GI-  Positive. Did have a couple of days of loose stools, but now has resolved.  Genitourinary- Negative  Musculoskeletal- Positive. Chest pain across the upper chest, mostly just right of mediastinum.  Neurology- Negative  Dermatology- Negative  Endocrine- Negative  Lymphatic- Negative  Psychiatry- Negative  A complete ROS was otherwise negative except as noted in the HPI.    LMP 06/07/2014 (Exact Date)  Physical Exam  GENERAL APPEARANCE: Alert, Oriented x3. Not in distress.  EYES: PERRL, EOMI  HENT: Nasal mucosa with no hyperemia and no edema, no nasal polyps.  MOUTH: Oral mucosa is moist, without any lesions, no tonsillar enlargement, no oropharyngeal exudate.  NECK: Supple, no masses, no thyromegaly.  LYMPHATICS: No significant axillary, cervical, or supraclavicular nodes.  RESP: Normal percussion, good air flow throughout Left lung. Rt. lung insp wheeze heard and poor exhalation.  CV: Normal S1, S2, regular rhythm, normal rate, no rub, no murmur,  no gallop. Trace right lower extremity edema, no left lower extremity edema.  ABDOMEN: Bowel sounds normal, soft, nontender, no HSM or masses.  MS: Extremities normal, no clubbing, no cyanosis.   SKIN: No rash on limited exam.  NEURO: Mentation intact, speech normal, normal strength and tone, normal gait, and stance.  PSYCH: Mentation appears normal, and affect normal/bright.    Recent Results (from the past 168 hour(s))   INR    Collection Time: 08/29/18 12:00 AM   Result Value Ref Range    INR 2.41    Basic metabolic panel    Collection Time: 09/04/18 10:52 AM   Result Value Ref Range    Sodium 134 133 - 144 mmol/L    Potassium 4.3 3.4 - 5.3 mmol/L    Chloride 103 94 - 109 mmol/L    Carbon Dioxide 22 20 - 32 mmol/L    Anion Gap 10 3 - 14 mmol/L    Glucose 117 (H) 70 - 99 mg/dL    Urea Nitrogen 27 7 - 30 mg/dL    Creatinine 1.22 (H) 0.52 - 1.04 mg/dL    GFR Estimate 46 (L) >60 mL/min/1.7m2    GFR Estimate If Black 55 (L) >60 mL/min/1.7m2    Calcium 8.8 8.5 - 10.1 mg/dL    Magnesium    Collection Time: 09/04/18 10:52 AM   Result Value Ref Range    Magnesium 1.7 1.6 - 2.3 mg/dL   CBC with platelets    Collection Time: 09/04/18 10:52 AM   Result Value Ref Range    WBC 10.5 4.0 - 11.0 10e9/L    RBC Count 2.43 (L) 3.8 - 5.2 10e12/L    Hemoglobin 7.6 (L) 11.7 - 15.7 g/dL    Hematocrit 25.6 (L) 35.0 - 47.0 %     (H) 78 - 100 fl    MCH 31.3 26.5 - 33.0 pg    MCHC 29.7 (L) 31.5 - 36.5 g/dL    RDW 15.9 (H) 10.0 - 15.0 %    Platelet Count 387 150 - 450 10e9/L   INR    Collection Time: 09/04/18 10:52 AM   Result Value Ref Range    INR 2.06 (H) 0.86 - 1.14        Results as noted above.    PFT Interpretation:  Severe obstructive ventilatory defect along with severe restriction.  FEV1 is stable.  This is post tx best.   Valid Maneuver      CXR (9/4/2018), personally reviewed by me: The Lung fields are clear. No effusion. Cardiac silhouette is wnl.      Assessment and Plan: Kecia Blue is a 57-year-old female with a history of dermatomyositis, seronegative rheumatoid arthritis, interstitial lung disease, and pulmonary hypertension who was admitted to the hospital with acute worsening of chronic hypoxic respiratory failure in 02/2018 and progressed [facility] for right heart failure and subsequently underwent bilateral sequential lung transplant on 03/01/2018. Her post-transplant course was generally uncomplicated; however, she needed pleural effusion tap on the right side later.     # Bilateral Lung Transplant: She severe restriction with low PFT's. Etiology unclear - ?chest wall restriction. Possible diaphram weakness/paralysis. Deconditioning might be playing a role.  Current IS regimen:   - Tacrolimus with a goal of 10-15 nanograms/mL, mycophenolate 1500 mg twice a day and prednisone taper per protocol.    DSA positive since 5/8/18 - it is unchanged on 5/16/18. It is DQB7.  9/4/2018: FEV1 is stable. CXR is stable. Sx are slightly worse. Lung exam consistent with worsening narrowing  on Rt main stem.    - No changes in IS regimen. Has had TBBx only once since ltx and it was on 4/2018.      # Right main bronchial stenosis s/p dilatation: Last Dilation on 8/20, next scheduled in one week. Still a good candidate for the dilation and possible stenting.  9/4/2018: Consider doing TBBx during the procedure, if feasible.  - Continue pentoxyfilline.    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 On Valcyte 450mg/day. Last CMV was better.  H/o C. diff colitis: Finished a 10day course of Oral Vancomycin.  High risk donor: Will need labs in 12 months.    # Hypomagnesemia: The patient remains on magnesium replacement. Her magnesium level today is acceptable.    # Provoked Left Upper Extremity Clot and Right IJ Clot (3/7/18):  The plan was 3 months. Repeat US on 4/16 with decreased subclavian thrombus and resolved axillary thrombus, but new (vs previously obscured) right internal jugular clot. On warfarin, goal INR 2 - 3. Plan to continue anticoag till mid October.    # Dermatomyositis with Raynaud's Phenomenon:  Followed by rheumatology.  - Myositis panel is positive.      # Hoarse Voice: She does complain of some hoarseness and is scheduled to see ENT, has vocal cord polyp seen at there last bronch.      # CKD stag III: Cr has increased again.   - Will follow for now.      # Anemia: with hgb of 7.6 down from baseline 8-9s. No s/s of bleeding. Received 1 U PRBC during admission, suspect possible occult bleeding from C diff. Hgb is stable at 7.5. Iron panel, folate/B12 WNL.  - Monitor      # Nausea and early satiety: Worsened with the Bactrim. Wants to eat, but gets full quickly. No epigastric pain, but notes increased cough and rhinorrhea. CXR demonstrates ongoing dilated loops of bowel, stable from prior. CT abd pelvis on  8/20/18 mildly dilated loops of SB.  9/4/2018: Wean reglan to BID and then to off if possible. Can use it on a as needed basis. She is unsure if reglan helped or not.   - cont pantoprazole to 40 mg BID  - Gastroenterology referral pending on 12/2018.      # HTN: Well controlled on Metoprolol 25mg BID.      RTC in 3 to 4 weeks with labs including spirometry and xray due to drop in FEV1.      Scribe Disclosure:   I, Jacinto Duong, am serving as a scribe; to document services personally performed by Tarik Christensen MD based on data collection and the provider's statements to me.     Provider Disclosure:  I agree with above History, Review of Systems, Physical exam and Plan. I have reviewed the content of the documentation and have edited it as needed. I have personally performed the services documented here and the documentation accurately represents those services and the decisions I have made.      Electronically signed by:  Tarik Christensen MD.    Transplant Coordinator Note      Reason for visit: Post lung transplant follow up visit   Coordinator: Present   Caregiver:  present      Health concerns addressed today:  1. Pt reports feeling ok today. She reports she is having a more productive cough. She reports feeling more restricted breathing and some wheezing.   2. Pt reports having arthritis in her knees. Her left knee bothers her more than her right knee.   3. PFT's increased toady.   4. Pt reports feeling more energy today.   5. Denies heart burn and acid reflux. Denies bloating. Reports some nausea.   6. Denies headache.   7. Vision in the right eye blurry. Recommended to see an eye doctor.    8. The pro's and con's of a lung stent discussed with pt and .   9. Pt reports having issues sleeping in the night. Pt instructed to try to get more exercise during the day, and drink camomile tea at bedtime.      Activity/rehab: pulmonary rehab starts on 4/6  Oxygen needs: RA  Pain management/RX: na  Diabetic  management: na  Next Bronch due: , will need lovenox bridging, coumadin clinic aware   High risk donor:  Yes   CMV status: R+/D+  Valcyte stopped: at 3 month zachary, June 1st   DVT/PE: yes  AC/asa: coumadin  PJP prophylactic: bactrim  Other: she has raynauds's --fingers purple. referral placed for rheumatology to see her      Pt Education: medications (use/dose/side effects), how/when to call coordinator, frequency of labs, s/s of infection/rejection, call prior to starting any new medications, lab/vital sign book, prednisone taper  Health Maintenance:     Last colonoscopy:     Next colonoscopy due:     Dermatology: annually     Vaccinations this visit: na      Labs, CXR, PFTs reviewed with patient  Medication record reviewed and reconciled  Questions and concerns addressed    Tarik Christensen MD

## 2018-09-04 NOTE — PROGRESS NOTES
Transplant Coordinator Note      Reason for visit: Post lung transplant follow up visit   Coordinator: Present   Caregiver:  present      Health concerns addressed today:  1. Pt reports feeling ok today. She reports she is having a more productive cough. She reports feeling more restricted breathing and some wheezing.   2. Pt reports having arthritis in her knees. Her left knee bothers her more than her right knee.   3. PFT's increased toady.   4. Pt reports feeling more energy today.   5. Denies heart burn and acid reflux. Denies bloating. Reports some nausea.   6. Denies headache.   7. Vision in the right eye blurry. Recommended to see an eye doctor.    8. The pro's and con's of a lung stent discussed with pt and .   9. Pt reports having issues sleeping in the night. Pt instructed to try to get more exercise during the day, and drink camomile tea at bedtime.      Activity/rehab: pulmonary rehab starts on 4/6  Oxygen needs: RA  Pain management/RX: na  Diabetic management: na  Next Bronch due: , will need lovenox bridging, coumadin clinic aware   High risk donor:  Yes   CMV status: R+/D+  Valcyte stopped: at 3 month zachary, June 1st   DVT/PE: yes  AC/asa: coumadin  PJP prophylactic: bactrim  Other: she has raynauds's --fingers purple. referral placed for rheumatology to see her      Pt Education: medications (use/dose/side effects), how/when to call coordinator, frequency of labs, s/s of infection/rejection, call prior to starting any new medications, lab/vital sign book, prednisone taper  Health Maintenance:     Last colonoscopy:     Next colonoscopy due:     Dermatology: annually     Vaccinations this visit: na      Labs, CXR, PFTs reviewed with patient  Medication record reviewed and reconciled  Questions and concerns addressed

## 2018-09-04 NOTE — PROGRESS NOTES
ANTICOAGULATION FOLLOW-UP CLINIC VISIT    Patient Name:  Kecia Blue  Date:  9/4/2018  Contact Type:  Telephone    SUBJECTIVE:     Patient Findings     Comments Patient will start holding warfarin for an upcoming bronch tomorrow.  Requested that patient call back and verify dosing over the weekend.            OBJECTIVE    INR   Date Value Ref Range Status   09/04/2018 2.06 (H) 0.86 - 1.14 Final       ASSESSMENT / PLAN  No question data found.  Anticoagulation Summary as of 9/4/2018     INR goal 2.0-3.0   Today's INR 2.06   Warfarin maintenance plan No maintenance plan   Full warfarin instructions 9/4: 6 mg; 9/5: Hold; 9/6: Hold; 9/7: Hold; 9/8: Hold; 9/9: Hold; Otherwise No maintenance plan   Next INR check 9/10/2018   Priority INR   Target end date     Indications   Lung transplant recipient (H) [Z94.2]  Deep vein thrombosis (DVT) (H) [I82.409] [I82.409]         Anticoagulation Episode Summary     INR check location Clinic Lab    Preferred lab     Send INR reminders to [facility]    Comments Extended duration orders until October per Magaly Castro RN- Provoked [id number] OK and  Verbal OK to speak with spouse and leave msg @ [phone].  labs @ [facility] starting ~ 5/25/18.  fax: [phone]. ph:[phone] BRONCH PLAN NOTE 6/6/18      Anticoagulation Care Providers     Provider Role Specialty Phone number    Ronalddat Ame Patel PA-C Responsible Pulmonary [phone]            See the Encounter Report to view Anticoagulation Flowsheet and Dosing Calendar (Go to Encounters tab in chart review, and find the Anticoagulation Therapy Visit)    Left message for patient with results and dosing recommendations. Asked patient to call back to report any missed doses, falls, signs and symptoms of bleeding or clotting, any changes in health, medication, or diet. Asked patient to call back with any questions or concerns.     Katie Bush RN

## 2018-09-04 NOTE — PATIENT INSTRUCTIONS
Patient instructions:   1. Use artifical tears to keep eyes moist. See your eye doctor for blurry vision.   2. You may see the dentist. This can be done after you stop coumadin.   3. You may need a stent in your lung. Sometimes this can make you cough more.   4. You will have an OR bronch on Monday.   5. Hydrate well with fluids. 60-70 ounces of fluids per day.   6. Reduce the Reglan to 5 mg twice a day. If you are doing good on this dose, then cut back to once a day. Then eventually stop this medication. If you feel like you need this medication, let us know and you may restart the medication.   7. Elevate the head of your bed, or get a wedge pillow.   8. Try to get more exercise during the day. Drink chamomile tea at bedtime.   9. You may do blood work every 2 weeks for now. If we need labs more often we will let you know.

## 2018-09-05 LAB
CMV DNA SPEC NAA+PROBE-ACNC: <137 [IU]/ML
CMV DNA SPEC NAA+PROBE-LOG#: <2.1 {LOG_IU}/ML
DONOR IDENTIFICATION: NORMAL
DSA COMMENTS: NORMAL
DSA PRESENT: NO
DSA TEST METHOD: NORMAL
ORGAN: NORMAL
PRA DONOR SPECIFIC ABY: NORMAL
SA1 CELL: NORMAL
SA1 COMMENTS: NORMAL
SA1 HI RISK ABY: NORMAL
SA1 MOD RISK ABY: NORMAL
SA1 TEST METHOD: NORMAL
SA2 CELL: NORMAL
SA2 COMMENTS: NORMAL
SA2 HI RISK ABY UA: NORMAL
SA2 MOD RISK ABY: NORMAL
SA2 TEST METHOD: NORMAL
SPECIMEN SOURCE: ABNORMAL

## 2018-09-05 NOTE — PROGRESS NOTES
Kecia, CMV level is now negative.  Please continue to take Valcyte as ordered.  Get another level in one week.  Thank you, Magaly

## 2018-09-06 LAB
EXPTIME-PRE: 6.81 SEC
FEF2575-%PRED-PRE: 26 %
FEF2575-PRE: 0.64 L/SEC
FEF2575-PRED: 2.44 L/SEC
FEFMAX-%PRED-PRE: 48 %
FEFMAX-PRE: 3.12 L/SEC
FEFMAX-PRED: 6.43 L/SEC
FEV1-%PRED-PRE: 43 %
FEV1-PRE: 1.11 L
FEV1FEV6-PRE: 65 %
FEV1FEV6-PRED: 81 %
FEV1FVC-PRE: 68 %
FEV1FVC-PRED: 79 %
FIFMAX-PRE: 2.99 L/SEC
FVC-%PRED-PRE: 49 %
FVC-PRE: 1.62 L
FVC-PRED: 3.24 L

## 2018-09-07 LAB
INR PPP: 1.51
PT - PROTHROMBINE TIME (EXTERNAL): 14.9 SECS (ref 9.1–10.9)

## 2018-09-08 ENCOUNTER — ANESTHESIA EVENT (OUTPATIENT)
Dept: SURGERY | Facility: CLINIC | Age: 58
End: 2018-09-08
Payer: COMMERCIAL

## 2018-09-08 NOTE — ANESTHESIA PREPROCEDURE EVALUATION
Anesthesia Evaluation     . Pt has had prior anesthetic. Type: General    History of anesthetic complications   - PONV        ROS/MED HX    ENT/Pulmonary: Comment: ILD s/p B lung txp    (+), . Other pulmonary disease Interstitial Lung Disease s/p Lung transplant 3/2018.    Neurologic:  - neg neurologic ROS     Cardiovascular: Comment: Hx of ECMO de cannulated after tx    Recent Echo now with normal EF        (+) hypertension----. Taking blood thinners Pt has not received instructions: . . . :. . pulmonary hypertension (2/2 ILD (now s/p B lung txp)), Previous cardiac testing Echodate:results:date: results:ECG reviewed date: results: date: results:          METS/Exercise Tolerance:  4 - Raking leaves, gardening   Hematologic:     (+) History of blood clots (IJ blood clot on coumadin, transitioned to lovenox for procedure today) pt is anticoagulated, History of Transfusion no previous transfusion reaction -      Musculoskeletal:  - neg musculoskeletal ROS       GI/Hepatic:     (+) GERD       Renal/Genitourinary:     (+) chronic renal disease, type: CRI, Pt does not require dialysis, Pt has no history of transplant,       Endo:     (+) Chronic steroid usage for Post Transplant Immunosuppression .      Psychiatric:  - neg psychiatric ROS       Infectious Disease:         Malignancy:      - no malignancy   Other: Comment: Rheumatoid arthritis; Arron's   (+) No chance of pregnancy C-spine cleared: N/A, no H/O Chronic Pain,no other significant disability                    Physical Exam  Normal systems: cardiovascular and pulmonary    Airway   Mallampati: II  TM distance: >3 FB  Neck ROM: full    Dental     Cardiovascular   Rhythm and rate: regular and normal      Pulmonary    breath sounds clear to auscultation(-) no wheezes and no rales                    Anesthesia Plan      History & Physical Review  History and physical reviewed and following examination; no interval change.    ASA Status:  3 .    NPO Status:  > 8  hours    Plan for General and ETT with Intravenous induction. Maintenance will be TIVA.    PONV prophylaxis:  Ondansetron (or other 5HT-3), Dexamethasone or Solumedrol and Scopolamine patch       Postoperative Care  Postoperative pain management:  IV analgesics and Multi-modal analgesia.      Consents  Anesthetic plan, risks, benefits and alternatives discussed with:  Patient..                          .

## 2018-09-10 ENCOUNTER — ANTICOAGULATION THERAPY VISIT (OUTPATIENT)
Dept: ANTICOAGULATION | Facility: CLINIC | Age: 58
End: 2018-09-10

## 2018-09-10 ENCOUNTER — APPOINTMENT (OUTPATIENT)
Dept: GENERAL RADIOLOGY | Facility: CLINIC | Age: 58
End: 2018-09-10
Attending: INTERNAL MEDICINE
Payer: COMMERCIAL

## 2018-09-10 ENCOUNTER — SURGERY (OUTPATIENT)
Age: 58
End: 2018-09-10

## 2018-09-10 ENCOUNTER — HOSPITAL ENCOUNTER (OUTPATIENT)
Facility: CLINIC | Age: 58
Discharge: HOME | End: 2018-09-10
Attending: INTERNAL MEDICINE | Admitting: INTERNAL MEDICINE
Payer: COMMERCIAL

## 2018-09-10 ENCOUNTER — ANESTHESIA (OUTPATIENT)
Dept: SURGERY | Facility: CLINIC | Age: 58
End: 2018-09-10
Payer: COMMERCIAL

## 2018-09-10 VITALS
BODY MASS INDEX: 20.63 KG/M2 | DIASTOLIC BLOOD PRESSURE: 80 MMHG | TEMPERATURE: 97.7 F | OXYGEN SATURATION: 99 % | SYSTOLIC BLOOD PRESSURE: 120 MMHG | HEIGHT: 64 IN | RESPIRATION RATE: 18 BRPM | WEIGHT: 120.81 LBS

## 2018-09-10 DIAGNOSIS — J84.9 ILD (INTERSTITIAL LUNG DISEASE) (H): Primary | ICD-10-CM

## 2018-09-10 DIAGNOSIS — Z94.2 LUNG TRANSPLANT RECIPIENT (H): ICD-10-CM

## 2018-09-10 DIAGNOSIS — Z94.2 STATUS POST LUNG TRANSPLANTATION (H): Primary | ICD-10-CM

## 2018-09-10 DIAGNOSIS — I82.409 DEEP VEIN THROMBOSIS (DVT) (H): ICD-10-CM

## 2018-09-10 DIAGNOSIS — Z79.899 ENCOUNTER FOR LONG-TERM (CURRENT) USE OF HIGH-RISK MEDICATION: ICD-10-CM

## 2018-09-10 LAB
CREAT SERPL-MCNC: 1.31 MG/DL (ref 0.52–1.04)
GFR SERPL CREATININE-BSD FRML MDRD: 42 ML/MIN/1.7M2
GLUCOSE BLDC GLUCOMTR-MCNC: 104 MG/DL (ref 70–99)
HGB BLD-MCNC: 7.3 G/DL (ref 11.7–15.7)
INR PPP: 1.16 (ref 0.86–1.14)
POTASSIUM SERPL-SCNC: 4.1 MMOL/L (ref 3.4–5.3)

## 2018-09-10 PROCEDURE — 71000027 ZZH RECOVERY PHASE 2 EACH 15 MINS: Performed by: INTERNAL MEDICINE

## 2018-09-10 PROCEDURE — 85610 PROTHROMBIN TIME: CPT | Performed by: ANESTHESIOLOGY

## 2018-09-10 PROCEDURE — 25000125 ZZHC RX 250: Performed by: NURSE ANESTHETIST, CERTIFIED REGISTERED

## 2018-09-10 PROCEDURE — 85018 HEMOGLOBIN: CPT | Performed by: ANESTHESIOLOGY

## 2018-09-10 PROCEDURE — 40000170 ZZH STATISTIC PRE-PROCEDURE ASSESSMENT II: Performed by: INTERNAL MEDICINE

## 2018-09-10 PROCEDURE — 25000128 H RX IP 250 OP 636: Performed by: INTERNAL MEDICINE

## 2018-09-10 PROCEDURE — C1874 STENT, COATED/COV W/DEL SYS: HCPCS | Performed by: INTERNAL MEDICINE

## 2018-09-10 PROCEDURE — C1726 CATH, BAL DIL, NON-VASCULAR: HCPCS | Performed by: INTERNAL MEDICINE

## 2018-09-10 PROCEDURE — 82962 GLUCOSE BLOOD TEST: CPT

## 2018-09-10 PROCEDURE — 37000009 ZZH ANESTHESIA TECHNICAL FEE, EACH ADDTL 15 MIN: Performed by: INTERNAL MEDICINE

## 2018-09-10 PROCEDURE — 25000128 H RX IP 250 OP 636: Performed by: NURSE ANESTHETIST, CERTIFIED REGISTERED

## 2018-09-10 PROCEDURE — 82565 ASSAY OF CREATININE: CPT | Performed by: ANESTHESIOLOGY

## 2018-09-10 PROCEDURE — 84132 ASSAY OF SERUM POTASSIUM: CPT | Performed by: ANESTHESIOLOGY

## 2018-09-10 PROCEDURE — 36000059 ZZH SURGERY LEVEL 3 EA 15 ADDTL MIN UMMC: Performed by: INTERNAL MEDICINE

## 2018-09-10 PROCEDURE — 36000057 ZZH SURGERY LEVEL 3 1ST 30 MIN - UMMC: Performed by: INTERNAL MEDICINE

## 2018-09-10 PROCEDURE — 40000986 XR CHEST PORT 1 VW

## 2018-09-10 PROCEDURE — 27210794 ZZH OR GENERAL SUPPLY STERILE: Performed by: INTERNAL MEDICINE

## 2018-09-10 PROCEDURE — 36415 COLL VENOUS BLD VENIPUNCTURE: CPT | Performed by: ANESTHESIOLOGY

## 2018-09-10 PROCEDURE — 71000014 ZZH RECOVERY PHASE 1 LEVEL 2 FIRST HR: Performed by: INTERNAL MEDICINE

## 2018-09-10 PROCEDURE — 37000008 ZZH ANESTHESIA TECHNICAL FEE, 1ST 30 MIN: Performed by: INTERNAL MEDICINE

## 2018-09-10 PROCEDURE — 40000277 XR SURGERY CARM FLUORO LESS THAN 5 MIN W STILLS: Mod: TC

## 2018-09-10 DEVICE — STENT TRACHEOBRONCHIAL AERO 12X30MM 90129-211
Type: IMPLANTABLE DEVICE | Site: BRONCHUS | Status: NON-FUNCTIONAL
Removed: 2018-10-11

## 2018-09-10 RX ORDER — SODIUM CHLORIDE, SODIUM LACTATE, POTASSIUM CHLORIDE, CALCIUM CHLORIDE 600; 310; 30; 20 MG/100ML; MG/100ML; MG/100ML; MG/100ML
INJECTION, SOLUTION INTRAVENOUS CONTINUOUS PRN
Status: DISCONTINUED | OUTPATIENT
Start: 2018-09-10 | End: 2018-09-10

## 2018-09-10 RX ORDER — LIDOCAINE 40 MG/G
CREAM TOPICAL
Status: DISCONTINUED | OUTPATIENT
Start: 2018-09-10 | End: 2018-09-10 | Stop reason: HOSPADM

## 2018-09-10 RX ORDER — LIDOCAINE HYDROCHLORIDE 20 MG/ML
INJECTION, SOLUTION INFILTRATION; PERINEURAL PRN
Status: DISCONTINUED | OUTPATIENT
Start: 2018-09-10 | End: 2018-09-10

## 2018-09-10 RX ORDER — ONDANSETRON 2 MG/ML
4 INJECTION INTRAMUSCULAR; INTRAVENOUS EVERY 30 MIN PRN
Status: DISCONTINUED | OUTPATIENT
Start: 2018-09-10 | End: 2018-09-10 | Stop reason: HOSPADM

## 2018-09-10 RX ORDER — NALOXONE HYDROCHLORIDE 0.4 MG/ML
.1-.4 INJECTION, SOLUTION INTRAMUSCULAR; INTRAVENOUS; SUBCUTANEOUS
Status: DISCONTINUED | OUTPATIENT
Start: 2018-09-10 | End: 2018-09-10 | Stop reason: HOSPADM

## 2018-09-10 RX ORDER — SODIUM CHLORIDE FOR INHALATION 3 %
3 VIAL, NEBULIZER (ML) INHALATION
Qty: 250 ML | Refills: 3 | Status: SHIPPED | OUTPATIENT
Start: 2018-09-10 | End: 2018-09-10

## 2018-09-10 RX ORDER — PROPOFOL 10 MG/ML
INJECTION, EMULSION INTRAVENOUS CONTINUOUS PRN
Status: DISCONTINUED | OUTPATIENT
Start: 2018-09-10 | End: 2018-09-10

## 2018-09-10 RX ORDER — HYDRALAZINE HYDROCHLORIDE 20 MG/ML
2.5-5 INJECTION INTRAMUSCULAR; INTRAVENOUS EVERY 10 MIN PRN
Status: DISCONTINUED | OUTPATIENT
Start: 2018-09-10 | End: 2018-09-10 | Stop reason: HOSPADM

## 2018-09-10 RX ORDER — ONDANSETRON 2 MG/ML
INJECTION INTRAMUSCULAR; INTRAVENOUS PRN
Status: DISCONTINUED | OUTPATIENT
Start: 2018-09-10 | End: 2018-09-10

## 2018-09-10 RX ORDER — HYDROMORPHONE HYDROCHLORIDE 1 MG/ML
.3-.5 INJECTION, SOLUTION INTRAMUSCULAR; INTRAVENOUS; SUBCUTANEOUS EVERY 10 MIN PRN
Status: DISCONTINUED | OUTPATIENT
Start: 2018-09-10 | End: 2018-09-10 | Stop reason: HOSPADM

## 2018-09-10 RX ORDER — FENTANYL CITRATE 50 UG/ML
25-50 INJECTION, SOLUTION INTRAMUSCULAR; INTRAVENOUS
Status: DISCONTINUED | OUTPATIENT
Start: 2018-09-10 | End: 2018-09-10 | Stop reason: HOSPADM

## 2018-09-10 RX ORDER — FENTANYL CITRATE 50 UG/ML
INJECTION, SOLUTION INTRAMUSCULAR; INTRAVENOUS PRN
Status: DISCONTINUED | OUTPATIENT
Start: 2018-09-10 | End: 2018-09-10

## 2018-09-10 RX ORDER — PROPOFOL 10 MG/ML
INJECTION, EMULSION INTRAVENOUS PRN
Status: DISCONTINUED | OUTPATIENT
Start: 2018-09-10 | End: 2018-09-10

## 2018-09-10 RX ORDER — FENTANYL CITRATE 50 UG/ML
25-50 INJECTION, SOLUTION INTRAMUSCULAR; INTRAVENOUS EVERY 5 MIN PRN
Status: DISCONTINUED | OUTPATIENT
Start: 2018-09-10 | End: 2018-09-10 | Stop reason: HOSPADM

## 2018-09-10 RX ORDER — LABETALOL HYDROCHLORIDE 5 MG/ML
5 INJECTION, SOLUTION INTRAVENOUS EVERY 10 MIN PRN
Status: DISCONTINUED | OUTPATIENT
Start: 2018-09-10 | End: 2018-09-10 | Stop reason: HOSPADM

## 2018-09-10 RX ORDER — ONDANSETRON 4 MG/1
4 TABLET, ORALLY DISINTEGRATING ORAL EVERY 30 MIN PRN
Status: DISCONTINUED | OUTPATIENT
Start: 2018-09-10 | End: 2018-09-10 | Stop reason: HOSPADM

## 2018-09-10 RX ADMIN — FENTANYL CITRATE 25 MCG: 50 INJECTION, SOLUTION INTRAMUSCULAR; INTRAVENOUS at 09:52

## 2018-09-10 RX ADMIN — SODIUM CHLORIDE, POTASSIUM CHLORIDE, SODIUM LACTATE AND CALCIUM CHLORIDE: 600; 310; 30; 20 INJECTION, SOLUTION INTRAVENOUS at 08:59

## 2018-09-10 RX ADMIN — ROCURONIUM BROMIDE 30 MG: 10 INJECTION INTRAVENOUS at 09:09

## 2018-09-10 RX ADMIN — LIDOCAINE HYDROCHLORIDE 60 MG: 20 INJECTION, SOLUTION INFILTRATION; PERINEURAL at 09:09

## 2018-09-10 RX ADMIN — ROCURONIUM BROMIDE 20 MG: 10 INJECTION INTRAVENOUS at 09:12

## 2018-09-10 RX ADMIN — PROPOFOL 50 MCG/KG/MIN: 10 INJECTION, EMULSION INTRAVENOUS at 09:11

## 2018-09-10 RX ADMIN — MIDAZOLAM 2 MG: 1 INJECTION INTRAMUSCULAR; INTRAVENOUS at 08:59

## 2018-09-10 RX ADMIN — EPINEPHRINE 10 ML: 1 INJECTION PARENTERAL at 10:01

## 2018-09-10 RX ADMIN — SUGAMMADEX 200 MG: 100 INJECTION, SOLUTION INTRAVENOUS at 09:59

## 2018-09-10 RX ADMIN — FENTANYL CITRATE 50 MCG: 50 INJECTION, SOLUTION INTRAMUSCULAR; INTRAVENOUS at 09:09

## 2018-09-10 RX ADMIN — FENTANYL CITRATE 25 MCG: 50 INJECTION, SOLUTION INTRAMUSCULAR; INTRAVENOUS at 10:10

## 2018-09-10 RX ADMIN — PROPOFOL 100 MG: 10 INJECTION, EMULSION INTRAVENOUS at 09:09

## 2018-09-10 RX ADMIN — ONDANSETRON 4 MG: 2 INJECTION INTRAMUSCULAR; INTRAVENOUS at 09:59

## 2018-09-10 NOTE — ANESTHESIA CARE TRANSFER NOTE
Patient: Kecia Blue    Procedure(s):  Flexible and Rigid Bronchoscopy with Balloon Dilation, tissue debulking with cryo, and Right mainstem bronchus stent placement - Wound Class: II-Clean Contaminated    Diagnosis: Bronchial Stenosis   Diagnosis Additional Information: No value filed.    Anesthesia Type:   General     Note:  Airway :Face Mask  Patient transferred to:PACU  Comments: Pt transferred to PACU.  Maintaining airway and oxygenation on FM.  VSS.  Report to RN. Handoff Report: Identifed the Patient, Identified the Reponsible Provider, Reviewed the pertinent medical history, Discussed the surgical course, Reviewed Intra-OP anesthesia mangement and issues during anesthesia, Set expectations for post-procedure period and Allowed opportunity for questions and acknowledgement of understanding      Vitals: (Last set prior to Anesthesia Care Transfer)    CRNA VITALS  9/10/2018 0943 - 9/10/2018 1023      9/10/2018             Resp Rate (observed): (!)  3                Electronically Signed By: ADRIÁN Buckley CRNA  September 10, 2018  10:23 AM

## 2018-09-10 NOTE — IP AVS SNAPSHOT
MRN:[id number]                      After Visit Summary   9/10/2018    Kecia Blue    MRN: [id number]           Thank you!     Thank you for choosing [facility] for your care. Our goal is always to provide you with excellent care. Hearing back from our patients is one way we can continue to improve our services. Please take a few minutes to complete the written survey that you may receive in the mail after you visit with us. Thank you!        Patient Information     Date Of Birth          1960        About your hospital stay     You were admitted on:  September 10, 2018 You last received care in the:  Same Day Surgery [facility]    You were discharged on:  September 10, 2018       Who to Call     For medical emergencies, please call 911.  For non-urgent questions about your medical care, please call your primary care provider or clinic, [phone]  For questions related to your surgery, please call your surgery clinic        Attending Provider     Provider Specialty    Dincer, Wilber Warner MD Pulmonary       Primary Care Provider Office Phone # Fax #    Rosy Vu [phone]      Your next 10 appointments already scheduled     Oct 24, 2018 10:30 AM CDT   Lab with  LAB    Health Lab ([facility])    [address] [place] [id number] [phone]            Oct 24, 2018 10:45 AM CDT   XR CHEST 2 VIEWS with XR[address] [place] [address] Imaging Center Xray ([facility])    [address] [place] [address] [phone]           How do I prepare for my exam? (Food and drink instructions) No Food and Drink Restrictions.  How do I prepare for my exam? (Other instructions) You do not need to do anything special for this exam.  What should I wear: Wear comfortable clothes.  How long does the exam take: Most scans take less than 5 minutes.  What should I bring: Bring a  list of your medicines, including vitamins, minerals and over-the-counter drugs. It is safest to leave personal items at home.  Do I need a :  No  is needed.  What do I need to tell my doctor: Tell your doctor if there s any chance you are pregnant.  What should I do after the exam: No restrictions, You may resume normal activities.  What is this test: An image of a specific body part shown in shades of black and white.  Who should I call with questions: If you have any questions, please call the Imaging Department where you will have your exam. Directions, parking instructions, and other information is available on our website, [email or web address].            Oct 24, 2018 11:00 AM CDT   PFT VISIT with SUKI PFL D   [facility] Pulmonary Function Testing ([facility])    [address] [place] [address]   [phone]            Oct 24, 2018 11:20 AM CDT   (Arrive by 11:05 AM)   Return Lung Transplant with Srinivas Mcelroy MD   [facility] Solid Organ Transplant ([facility])    [address] [place] [address]   [phone]            Dec 13, 2018  8:40 AM CST   (Arrive by 8:25 AM)   New Patient Visit with Daron Cruz PA-C   [facility]astroenterology [facility] IBD [facility] ([facility])    [address] [place] [address]   [phone]              Further instructions from your care team       Post Bronchoscopy Patient Instructions:    September 10, 2018  Kecia Blue    Your procedure completed (bronchoscopy with dilation and stent placement) without any immediate complications.  You may cough up scant amount of blood for the next 12 hours. If you have excessive cough with blood, chest pain, shortness of breath, please report to the closest emergency room.    You may experience low grade (less than 100.5 F) fever next 24 hours, if so you can take  tylenol. If the fever persists more than 24 hours contact to our office or your primary care provider.    Our office (Thoracic/Pulmonary--[phone]) will call you to schedule nect bronchoscopy    Should you have any question, please do not hesitate to call our office.    DA Lin MD  [facility]  Same-Day Surgery   Adult Discharge Orders & Instructions     For 24 hours after surgery    1. Get plenty of rest.  A responsible adult must stay with you for at least 24 hours after you leave the hospital.   2. Do not drive or use heavy equipment.  If you have weakness or tingling, don't drive or use heavy equipment until this feeling goes away.  3. Do not drink alcohol.  4. Avoid strenuous or risky activities.  Ask for help when climbing stairs.   5. You may feel lightheaded.  IF so, sit for a few minutes before standing.  Have someone help you get up.   6. If you have nausea (feel sick to your stomach): Drink only clear liquids such as apple juice, ginger ale, broth or 7-Up.  Rest may also help.  Be sure to drink enough fluids.  Move to a regular diet as you feel able.  7. You may have a slight fever. Call the doctor if your fever is over 100 F (37.7 C) (taken under the tongue) or lasts longer than 24 hours.  8. You may have a dry mouth, a sore throat, muscle aches or trouble sleeping.  These should go away after 24 hours.  9. Do not make important or legal decisions.   Call your doctor for any of the followin.  Signs of infection (fever, growing tenderness at the surgery site, a large amount of drainage or bleeding, severe pain, foul-smelling drainage, redness, swelling).    2. It has been over 8 to 10 hours since surgery and you are still not able to urinate (pass water).    3.  Headache for over 24 hours.    4.  Numbness, tingling or weakness the day after surgery (if you had spinal anesthesia).  To contact doctor lin, call [phone] or:        [phone]  "and ask for the resident on call for pulmonology (answered 24 hours a day)      Emergency Department:    [facility]: [phone]           Additional Information     If you use hormonal birth control (such as the pill, patch, ring or implants): You'll need a second form of birth control for 7 days (condoms, a diaphragm or contraceptive foam). While in the hospital, you received a medicine called Bridion. Your normal birth control will not work as well for a week after taking this medicine.          Pending Results     Date and Time Order Name Status Description    9/10/2018 1008 XR Chest Port 1 View In process     9/10/2018 0801 CMV DNA quantification In process             Admission Information     Date & Time Provider Department Dept. Phone    9/10/2018 Wilber Lin MD Same Day Surgery [facility] [phone]      Your Vitals Were     Blood Pressure Temperature Respirations Height Weight Last Period    123/78 97.6  F (36.4  C) 16 1.626 m (5' 4\") 54.8 kg (120 lb 13 oz) 06/07/2014 (Exact Date)    Pulse Oximetry BMI (Body Mass Index)                99% 20.74 kg/m2          MyChart Information     [facility] gives you secure access to your electronic health record. If you see a primary care provider, you can also send messages to your care team and make appointments. If you have questions, please call your primary care clinic.  If you do not have a primary care provider, please call [phone] and they will assist you.        Care EveryWhere ID     This is your Care EveryWhere ID. This could be used by other organizations to access your [facility] medical records  [id number]        Equal Access to Services     ALBAN VALENCIA : Hadii patricia orellana Soto, waaxda luqadaha, qaybta kaalmada morro vaz. So [facility] [phone].    ATENCIÓN: Si habla español, tiene a taylor disposición servicios gratuitos de asistencia lingüística. Llame al [phone].    We comply " with applicable federal civil rights laws and [place] laws. We do not discriminate on the basis of race, color, national origin, age, disability, sex, sexual orientation, or gender identity.               Review of your medicines      START taking        Dose / Directions    order for DME   Used for:  Status post lung transplantation (H)        Equipment being ordered: Nebulizer   Quantity:  1 Device   Refills:  1       order for DME   Used for:  Lung transplant recipient (H)        Equipment being ordered: Nebulizer   Quantity:  1 Device   Refills:  1         CONTINUE these medicines which have NOT CHANGED        Dose / Directions    ACETAMINOPHEN PO        Dose:  1000 mg   Take 1,000 mg by mouth every 6 hours as needed for pain   Refills:  0       albuterol 108 (90 Base) MCG/ACT inhaler   Commonly known as:  PROAIR HFA/PROVENTIL HFA/VENTOLIN HFA   Used for:  SOB (shortness of breath), Wheezing        Dose:  2 puff   Inhale 2 puffs into the lungs every 6 hours as needed for shortness of breath / dyspnea or wheezing   Quantity:  1 Inhaler   Refills:  1       calcium carbonate 600 mg-vitamin D 400 units 600-400 MG-UNIT per tablet   Commonly known as:  CALTRATE   Used for:  S/P lung transplant (H), ILD (interstitial lung disease) (H), Lung transplant recipient (H), Encounter for long-term (current) use of high-risk medication, Anemia, unspecified type, Cytomegalovirus (CMV) viremia (H)        Dose:  1 tablet   Take 1 tablet by mouth daily   Refills:  0       enoxaparin 60 MG/0.6ML injection   Commonly known as:  LOVENOX   Used for:  Deep vein thrombosis (DVT) of upper extremity, unspecified chronicity, unspecified laterality, unspecified vein (H)        Dose:  60 mg   Inject 0.6 mLs (60 mg) Subcutaneous every 12 hours   Quantity:  12 Syringe   Refills:  0       levalbuterol 45 MCG/ACT Inhaler   Commonly known as:  XOPENEX HFA   Used for:  S/P lung transplant (H), Other constipation        Dose:  2 puff   Inhale 2  puffs into the lungs every 6 hours as needed for shortness of breath / dyspnea or wheezing   Quantity:  1 Inhaler   Refills:  11       magnesium oxide 400 MG tablet   Commonly known as:  MAG-OX   Used for:  Hypomagnesemia        Dose:  400 mg   Take 1 tablet (400 mg) by mouth daily   Quantity:  90 tablet   Refills:  3       metoclopramide 5 MG tablet   Commonly known as:  REGLAN   Used for:  Delayed gastric emptying        Dose:  5 mg   Take 1 tablet (5 mg) by mouth 3 times daily   Quantity:  90 tablet   Refills:  11       metoprolol tartrate 25 MG tablet   Commonly known as:  LOPRESSOR   Used for:  Paroxysmal atrial fibrillation (H)        Dose:  25 mg   Take 1 tablet (25 mg) by mouth 2 times daily   Quantity:  60 tablet   Refills:  11       multivitamin, therapeutic with minerals Tabs tablet   Used for:  Lung transplant recipient (H)        Dose:  1 tablet   Take 1 tablet by mouth daily   Quantity:  30 each   Refills:  11       mycophenolate 250 MG capsule   Commonly known as:  GENERIC EQUIVALENT   Used for:  S/P lung transplant (H)        Dose:  1500 mg   Take 6 capsules (1,500 mg) by mouth 2 times daily   Quantity:  360 capsule   Refills:  11       pantoprazole 40 MG EC tablet   Commonly known as:  PROTONIX   Used for:  Gastroesophageal reflux disease without esophagitis, ILD (interstitial lung disease) (H), Lung transplant recipient (H)        Dose:  40 mg   Take 1 tablet (40 mg) by mouth 2 times daily   Quantity:  60 tablet   Refills:  3       pentoxifylline 400 MG CR tablet   Commonly known as:  TRENtal   Used for:  Lung transplant recipient (H)        Dose:  400 mg   Take 1 tablet (400 mg) by mouth 2 times daily   Quantity:  60 tablet   Refills:  3       * predniSONE 2.5 MG tablet   Commonly known as:  DELTASONE   Used for:  Lung replaced by transplant (H)        Dose:  2.5 mg   Take 1 tablet (2.5 mg) by mouth daily 6/22/18             7.5                    7.5 7/20/18             7.5                    5  8/24/18             5                       5 9/21/18             5                       2.5   Quantity:  90 tablet   Refills:  1       * predniSONE 5 MG tablet   Commonly known as:  DELTASONE   Used for:  S/P lung transplant (H)        Follow taper card   Quantity:  180 tablet   Refills:  3       sulfamethoxazole-trimethoprim 800-160 MG per tablet   Commonly known as:  BACTRIM DS/SEPTRA DS   Used for:  Actinomyces infection, Lung replaced by transplant (H)        Dose:  1 tablet   Take 1 tablet by mouth daily X one month.   Quantity:  30 tablet   Refills:  1       * tacrolimus 1 MG capsule   Commonly known as:  GENERIC EQUIVALENT   Used for:  S/P lung transplant (H), Other constipation        Take 5 mg in the AM and 6 mg in the PM along with one 0.5mg in the am for total dose 5.5mg am and 6mg pm.   Quantity:  330 capsule   Refills:  11       * tacrolimus 0.5 MG capsule   Commonly known as:  GENERIC EQUIVALENT   Used for:  S/P lung transplant (H)        Dose:  0.5 mg   Take 1 capsule (0.5 mg) by mouth every morning Along with five 1mg capsules for total dose of 5.5mg am and 6mg pm.   Quantity:  30 capsule   Refills:  11       VALCYTE 450 MG tablet   Used for:  Cytomegalovirus (CMV) viremia (H), ILD (interstitial lung disease) (H), Lung transplant recipient (H), Encounter for long-term (current) use of high-risk medication, Anemia, unspecified type, S/P lung transplant (H)   Generic drug:  valGANciclovir        Dose:  450 mg   Take 450 mg by mouth daily   Quantity:  30 tablet   Refills:  6       warfarin 1 MG tablet   Commonly known as:  COUMADIN   Used for:  Status post lung transplantation (H), DVT (deep venous thrombosis) (H)        Take 4mg MWFSat and 6mg TuThSun, or as directed by the Medication Monitoring [facility] at the [facility].   Quantity:  140 tablet   Refills:  3       * Notice:  This list has 4 medication(s) that are the same as other medications prescribed for you. Read the directions carefully, and ask  your doctor or other care provider to review them with you.         Where to get your medicines      Some of these will need a paper prescription and others can be bought over the counter. Ask your nurse if you have questions.     Bring a paper prescription for each of these medications     order for DME    order for DME                Protect others around you: Learn how to safely use, store and throw away your medicines at www.disposemymeds.org.             Medication List: This is a list of all your medications and when to take them. Check marks below indicate your daily home schedule. Keep this list as a reference.      Medications           Morning Afternoon Evening Bedtime As Needed    ACETAMINOPHEN PO   Take 1,000 mg by mouth every 6 hours as needed for pain                                albuterol 108 (90 Base) MCG/ACT inhaler   Commonly known as:  PROAIR HFA/PROVENTIL HFA/VENTOLIN HFA   Inhale 2 puffs into the lungs every 6 hours as needed for shortness of breath / dyspnea or wheezing                                calcium carbonate 600 mg-vitamin D 400 units 600-400 MG-UNIT per tablet   Commonly known as:  CALTRATE   Take 1 tablet by mouth daily                                enoxaparin 60 MG/0.6ML injection   Commonly known as:  LOVENOX   Inject 0.6 mLs (60 mg) Subcutaneous every 12 hours                                levalbuterol 45 MCG/ACT Inhaler   Commonly known as:  XOPENEX HFA   Inhale 2 puffs into the lungs every 6 hours as needed for shortness of breath / dyspnea or wheezing                                magnesium oxide 400 MG tablet   Commonly known as:  MAG-OX   Take 1 tablet (400 mg) by mouth daily                                metoclopramide 5 MG tablet   Commonly known as:  REGLAN   Take 1 tablet (5 mg) by mouth 3 times daily                                metoprolol tartrate 25 MG tablet   Commonly known as:  LOPRESSOR   Take 1 tablet (25 mg) by mouth 2 times daily                                 multivitamin, therapeutic with minerals Tabs tablet   Take 1 tablet by mouth daily                                mycophenolate 250 MG capsule   Commonly known as:  GENERIC EQUIVALENT   Take 6 capsules (1,500 mg) by mouth 2 times daily                                order for DME   Equipment being ordered: Nebulizer                                order for DME   Equipment being ordered: Nebulizer                                pantoprazole 40 MG EC tablet   Commonly known as:  PROTONIX   Take 1 tablet (40 mg) by mouth 2 times daily                                pentoxifylline 400 MG CR tablet   Commonly known as:  TRENtal   Take 1 tablet (400 mg) by mouth 2 times daily                                * predniSONE 2.5 MG tablet   Commonly known as:  DELTASONE   Take 1 tablet (2.5 mg) by mouth daily 6/22/18             7.5                    7.5 7/20/18             7.5                    5 8/24/18             5                       5 9/21/18             5                       2.5                                * predniSONE 5 MG tablet   Commonly known as:  DELTASONE   Follow taper card                                sulfamethoxazole-trimethoprim 800-160 MG per tablet   Commonly known as:  BACTRIM DS/SEPTRA DS   Take 1 tablet by mouth daily X one month.                                * tacrolimus 1 MG capsule   Commonly known as:  GENERIC EQUIVALENT   Take 5 mg in the AM and 6 mg in the PM along with one 0.5mg in the am for total dose 5.5mg am and 6mg pm.                                * tacrolimus 0.5 MG capsule   Commonly known as:  GENERIC EQUIVALENT   Take 1 capsule (0.5 mg) by mouth every morning Along with five 1mg capsules for total dose of 5.5mg am and 6mg pm.                                VALCYTE 450 MG tablet   Take 450 mg by mouth daily   Generic drug:  valGANciclovir                                warfarin 1 MG tablet   Commonly known as:  COUMADIN   Take 4mg MWFSat and 6mg TuThSun, or as directed  by the Medication Monitoring Clinic at the [facility].                                * Notice:  This list has 4 medication(s) that are the same as other medications prescribed for you. Read the directions carefully, and ask your doctor or other care provider to review them with you.

## 2018-09-10 NOTE — PROGRESS NOTES
9/10/18 spoke to Kecia.    She reports getting a stent placed at today's bronchoscopy.  Instructed patient to restart Coumadin tonight at 6mg.  Will restart Lovenox injections tomorrow morning.  No signs of bleeding.  Will check next INR on Thursday.    Joshua Davis RN    ANTICOAGULATION FOLLOW-UP CLINIC VISIT    Patient Name:  Kecia Blue  Date:  9/10/2018  Contact Type:  Telephone    SUBJECTIVE:     Patient Findings     Positives Change in medications (Restarted Lovenox injections on 9/11.), No Problem Findings           OBJECTIVE    INR   Date Value Ref Range Status   09/10/2018 1.16 (H) 0.86 - 1.14 Final       ASSESSMENT / PLAN  INR assessment SUB    Recheck INR In: 4 DAYS    INR Location Clinic      Anticoagulation Summary as of 9/10/2018     INR goal 2.0-3.0   Today's INR 1.16!   Warfarin maintenance plan No maintenance plan   Full warfarin instructions 9/10: 6 mg; 9/11: 6 mg; 9/12: 6 mg; 9/13: 6 mg; Otherwise No maintenance plan   Next INR check 9/14/2018   Priority INR   Target end date     Indications   Lung transplant recipient (H) [Z94.2]  Deep vein thrombosis (DVT) (H) [I82.409] [I82.409]         Anticoagulation Episode Summary     INR check location Clinic Lab    Preferred lab     Send INR reminders to [facility]    Comments Extended duration orders until October per Magaly Castro RN- Provoked DVTHIPPA OK and  Verbal OK to speak with spouse and leave msg @ [phone].  labs @ [facility] starting ~ 5/25/18.  fax: [phone]. ph:[phone] BRONCH PLAN NOTE 6/6/18      Anticoagulation Care Providers     Provider Role Specialty Phone number    Ame Chow PA-C Responsible Pulmonary [phone]            See the Encounter Report to view Anticoagulation Flowsheet and Dosing Calendar (Go to Encounters tab in chart review, and find the Anticoagulation Therapy Visit)    Spoke with patient. Gave them their lab results and new warfarin recommendation.  No changes in  health, medication, or diet. No missed doses, no falls. No signs or symptoms of bleed or clotting.  Restarting Coumadin tonight, and Lovenox tomorrow.    Joshua Davis, RN

## 2018-09-10 NOTE — ANESTHESIA POSTPROCEDURE EVALUATION
Patient: Kecia Blue    Procedure(s):  Flexible and Rigid Bronchoscopy with Balloon Dilation, tissue debulking with cryo, and Right mainstem bronchus stent placement - Wound Class: II-Clean Contaminated    Diagnosis:Bronchial Stenosis   Diagnosis Additional Information: No value filed.    Anesthesia Type:  General    Note:  Anesthesia Post Evaluation    Patient location during evaluation: PACU  Patient participation: Able to fully participate in evaluation  Level of consciousness: awake and alert  Pain management: satisfactory to patient  Airway patency: patent  Cardiovascular status: acceptable and stable  Respiratory status: acceptable and spontaneous ventilation  Hydration status: euvolemic  PONV: controlled     Anesthetic complications: None          Last vitals:  Vitals:    09/10/18 0735   BP: 107/78   Resp: 16   Temp: 36.6  C (97.8  F)   SpO2: 100%         Electronically Signed By: Amador Schuler MD  September 10, 2018  10:27 AM

## 2018-09-10 NOTE — MR AVS SNAPSHOT
Kecia Blue   9/10/2018   Anticoagulation Therapy Visit    Description:  57 year old female   Provider:  Joshua Davis, RN   Department:  [facility]           INR as of 9/10/2018     Today's INR 1.16!      Anticoagulation Summary as of 9/10/2018     INR goal 2.0-3.0   Today's INR 1.16!   Full warfarin instructions 9/10: 6 mg; 9/11: 6 mg; 9/12: 6 mg; 9/13: 6 mg; Otherwise No maintenance plan   Next INR check 9/14/2018    Indications   Lung transplant recipient (H) [Z94.2]  Deep vein thrombosis (DVT) (H) [I82.409] [I82.409]         September 2018 Details    Sun Mon Tue Wed Thu Fri Sat           1                 2               3               4               5               6               7               8                 9               10      6 mg   See details      11      6 mg         12      6 mg         13      6 mg         14            15                 16               17               18               19               20               21               22                 23               24               25               26               27               28               29                 30                      Date Details   09/10 This INR check       Date of next INR:  9/14/2018         How to take your warfarin dose     To take:  6 mg Take 6 of the 1 mg tablets.

## 2018-09-10 NOTE — PROCEDURES
Procedure(s):    Bronchoscopy  Airway balloon dilation (1 site)  Stent Placement (1 sites)  Cryoprobe tissue removal (1 site)    Indication:  S/p bilateral lung transplant, multiple right anastomosis stenosis dilation    Attending of Record:     DA Lin MD    Medications:    General Anesthesia - See anesthesia flowsheet for details    Sedation Time:  Per Anesthesia Care Provider    Time Out:  Performed    The patient's medical record has been reviewed.  The indication for the procedure was reviewed.  The necessary history and physical examination was performed and reviewed.  The risks, benefits and alternatives of the procedure were discussed with the the patient in detail and she had the opportunity to ask questions.  I discussed in particular the potential complications including risks of minor or life-threatening bleeding and/or infection, respiratory failure, vocal cord trauma / paralysis, pneumothorax, and discomfort. Sedation risks were also discussed including abnormal heart rhythms, low blood pressure, and respiratory failure. All questions were answered to the best of my ability.  Verbal and written informed consent was obtained.  The proposed procedure and the patient's identification were verified prior to the procedure by the physician and the nurse.    The patient was assessed for the adequacy for the procedure and to receive medications.   Mental Status:  Alert and oriented x 3  Airway examination:  Class I (Complete visualization of soft palate)  Pulmonary:  Clear to ausculation bilaterally  CV:  RRR, no murmurs or gallops  ASA Grade:  (II)  Mild systemic disease    After clinical evaluation and reviewing the indication, risks, alternatives and benefits of the procedure the patient was deemed to be in satisfactory condition to undergo the procedure.      A Tuberculosis risk assessment was performed:  The patient has no known RISK of Tuberculosis    The procedure was performed in a  negative airflow room: No. The reason the patient could not be moved to a negative airflow room was no TB risk  Maneuvers / Procedure:     The bronchoscope was inserted through the rigid bronchoscope. The patient was intubated with a 8.5 mm rigid scope first after she was induced and paralyzed. Jet ventilation was done by Anesthesia team.    Airway Examination:  A complete airway examination was performed from the distal trachea to the subsegmental level in each lobe of both lungs.  Pertinent findings include revealed right anastomosis stenosis ~ 4-5 mm. Therapeutic scope could not be advanced.    Airway balloon dilation: performed by using Elation balloons up to 11 mm. Started at 10 mm and increased to 11 mm. Each size balloon kept inflated 45 sec. There was a small mucosal tear at 6 o'clock. Topical epi used to stop the oozing.     Cryo tissue debulking: performed by using 2.4 mm cryo probe and freeze/thaw technique all circumferentially to cover stenotic area except for the posterior wall.         Stent Placement: After first stent 12x15 mm looked short in the right anastomosis area a 12x30 mm merit/aero stent deployed with help of rigid scope and live fluoroscopy. Stent placement confirmed with fluorsoscopy and CXR after the procedure as well as w flexible scope.    Any disposable equipment was visually inspected and deemed to be intact immediately post procedure.      R anastomosis stenosis      L anastomosis      KONRAD      LLL      R anastomosis, post dilation up to 11 mm      R anastomosis stent      R anastomosis stent        Recommendations:     -->  CXR  -->  Home with nebs of 3% saline 3 times a day  -->  Repeat bronchoscopy in 4 weeks time

## 2018-09-10 NOTE — BRIEF OP NOTE
[facility], [place]    Brief Operative Note    Pre-operative diagnosis: Bronchial Stenosis   Post-operative diagnosis * No post-op diagnosis entered *  Procedure: Procedure(s):  Flexible and Rigid Bronchoscopy with Balloon Dilation, tissue debulking with cryo, and Right mainstem bronchus stent placement - Wound Class: II-Clean Contaminated  Surgeon: Surgeon(s) and Role:     * Wilber Lin MD - Primary  Anesthesia: General   Estimated blood loss: None  Drains: None  Specimens: * No specimens in log *  Findings:   None.  Complications: None.  Implants: S/p airway stent placement.

## 2018-09-10 NOTE — IP AVS SNAPSHOT
Same Day Surgery [address] [address]    Phone:  [phone]                                       After Visit Summary   9/10/2018    Kecia Blue    MRN: [id number]           After Visit Summary Signature Page     I have received my discharge instructions, and my questions have been answered. I have discussed any challenges I see with this plan with the nurse or doctor.    ..........................................................................................................................................  Patient/Patient Representative Signature      ..........................................................................................................................................  Patient Representative Print Name and Relationship to Patient    ..................................................               ................................................  Date                                            Time    ..........................................................................................................................................  Reviewed by Signature/Title    ...................................................              ..............................................  Date                                                            Time          22EPIC Rev 08/18

## 2018-09-12 DIAGNOSIS — Z94.2 LUNG REPLACED BY TRANSPLANT (H): Primary | ICD-10-CM

## 2018-09-12 LAB
CMV DNA SPEC NAA+PROBE-ACNC: <137 [IU]/ML
CMV DNA SPEC NAA+PROBE-LOG#: <2.1 {LOG_IU}/ML
SPECIMEN SOURCE: ABNORMAL

## 2018-09-12 NOTE — PROGRESS NOTES
Armand Mcdonnell, your CMV level is negative again which is good. Continue your Valcyte for now and please recheck this again next week. Please call the transplant office ([phone]) with any questions. ThanksMikayla

## 2018-09-12 NOTE — PROGRESS NOTES
CMV negative x2 9/4/18 and 9/10/18. Changed to maintenance dosed Valcyte 450 mg daily for CrCl 42. Will discuss with transplant pulmonologist and ID regarding this, whether we should continue maintenance therapy and for how long, or if we should stop and continue close monitoring. Discussed with patient.

## 2018-09-14 ENCOUNTER — ANTICOAGULATION THERAPY VISIT (OUTPATIENT)
Dept: ANTICOAGULATION | Facility: CLINIC | Age: 58
End: 2018-09-14

## 2018-09-14 DIAGNOSIS — I82.409 DEEP VEIN THROMBOSIS (DVT) (H): ICD-10-CM

## 2018-09-14 DIAGNOSIS — Z94.2 LUNG TRANSPLANT RECIPIENT (H): ICD-10-CM

## 2018-09-14 LAB — INR PPP: 1.61

## 2018-09-14 NOTE — MR AVS SNAPSHOT
Kecia Blue   9/14/2018   Anticoagulation Therapy Visit    Description:  57 year old female   Provider:  Ramin Hines [facility]   Department:  [facility]           INR as of 9/14/2018     Today's INR 1.61!      Anticoagulation Summary as of 9/14/2018     INR goal 2.0-3.0   Today's INR 1.61!   Full warfarin instructions 9/14: 6 mg; 9/15: 6 mg; 9/16: 4 mg; Otherwise No maintenance plan   Next INR check 9/17/2018    Indications   Lung transplant recipient (H) [Z94.2]  Deep vein thrombosis (DVT) (H) [I82.409] [I82.409]         September 2018 Details    Sun Mon Tue Wed Thu Fri Sat           1                 2               3               4               5               6               7               8                 9               10               11               12               13               14      6 mg   See details      15      6 mg           16      4 mg         17            18               19               20               21               22                 23               24               25               26               27               28               29                 30                      Date Details   09/14 This INR check       Date of next INR:  9/17/2018         How to take your warfarin dose     To take:  4 mg Take 4 of the 1 mg tablets.    To take:  6 mg Take 6 of the 1 mg tablets.

## 2018-09-14 NOTE — PROGRESS NOTES
ANTICOAGULATION FOLLOW-UP CLINIC VISIT    Patient Name:  Kecia Blue  Date:  9/14/2018  Contact Type:  Telephone    SUBJECTIVE:     Patient Findings     Positives Initiation of therapy, No Problem Findings           OBJECTIVE    INR   Date Value Ref Range Status   09/14/2018 1.61  Final       ASSESSMENT / PLAN  INR assessment SUB    Recheck INR In: 3 DAYS    INR Location Outside lab      Anticoagulation Summary as of 9/14/2018     INR goal 2.0-3.0   Today's INR 1.61!   Warfarin maintenance plan No maintenance plan   Full warfarin instructions 9/14: 6 mg; 9/15: 6 mg; 9/16: 4 mg; Otherwise No maintenance plan   Next INR check 9/17/2018   Priority INR   Target end date     Indications   Lung transplant recipient (H) [Z94.2]  Deep vein thrombosis (DVT) (H) [I82.409] [I82.409]         Anticoagulation Episode Summary     INR check location Clinic Lab    Preferred lab     Send INR reminders to [facility]    Comments Extended duration orders until October per Magaly Castro RN- Provoked DVTHIPPA OK and  Verbal OK to speak with spouse and leave msg @ [phone].  labs @ [facility] starting ~ 5/25/18.  fax: [phone]. ph:[phone] BRONCH PLAN NOTE 6/6/18      Anticoagulation Care Providers     Provider Role Specialty Phone number    Ame Chow PA-C Responsible Pulmonary [phone]            See the Encounter Report to view Anticoagulation Flowsheet and Dosing Calendar (Go to Encounters tab in chart review, and find the Anticoagulation Therapy Visit)    Spoke with patient. Gave them their lab results and new warfarin recommendation.  No changes in health, medication, or diet. No missed doses, no falls. No signs or symptoms of bleed or clotting.  Kecia will continue her Lovenox until the INR is therapeutic      Ramin Hines [facility]

## 2018-09-18 ENCOUNTER — ANTICOAGULATION THERAPY VISIT (OUTPATIENT)
Dept: ANTICOAGULATION | Facility: CLINIC | Age: 58
End: 2018-09-18

## 2018-09-18 DIAGNOSIS — Z94.2 LUNG TRANSPLANT RECIPIENT (H): ICD-10-CM

## 2018-09-18 DIAGNOSIS — I82.409 DEEP VEIN THROMBOSIS (DVT) (H): ICD-10-CM

## 2018-09-18 LAB — INR PPP: 1.96

## 2018-09-18 NOTE — PROGRESS NOTES
ANTICOAGULATION FOLLOW-UP CLINIC VISIT    Patient Name:  Kecia Blue  Date:  9/18/2018  Contact Type:  Telephone    SUBJECTIVE:     Patient Findings     Positives No Problem Findings           OBJECTIVE    INR   Date Value Ref Range Status   09/18/2018 1.96  Final       ASSESSMENT / PLAN  INR assessment THER    Recheck INR In: 3 DAYS    INR Location Outside lab      Anticoagulation Summary as of 9/18/2018     INR goal 2.0-3.0   Today's INR 1.96!   Warfarin maintenance plan No maintenance plan   Full warfarin instructions 9/18: 6 mg; 9/19: 6 mg; 9/20: 6 mg; Otherwise No maintenance plan   Next INR check 9/21/2018   Priority INR   Target end date     Indications   Lung transplant recipient (H) [Z94.2]  Deep vein thrombosis (DVT) (H) [I82.409] [I82.409]         Anticoagulation Episode Summary     INR check location Clinic Lab    Preferred lab     Send INR reminders to [facility]    Comments Extended duration orders until October per Magaly Castro RN- Provoked DVTHIPPA OK and  Verbal OK to speak with spouse and leave msg @ [phone].  labs @ [facility] starting ~ 5/25/18.  fax: [phone]. ph:[phone] BRONCH PLAN NOTE 6/6/18      Anticoagulation Care Providers     Provider Role Specialty Phone number    Ame Chow PA-C Responsible Pulmonary [phone]            See the Encounter Report to view Anticoagulation Flowsheet and Dosing Calendar (Go to Encounters tab in chart review, and find the Anticoagulation Therapy Visit)    Spoke with patient. Gave them their lab results and new warfarin recommendation.  No changes in health, medication, or diet. No missed doses, no falls. No signs or symptoms of bleed or clotting.  Patient will take her last dose of Lovenox Injection tonight.    Ramin Hines [facility]

## 2018-09-18 NOTE — MR AVS SNAPSHOT
Kecia Blue   9/18/2018   Anticoagulation Therapy Visit    Description:  57 year old female   Provider:  Ramin Hines [facility]   Department:  [facility]           INR as of 9/18/2018     Today's INR 1.96!      Anticoagulation Summary as of 9/18/2018     INR goal 2.0-3.0   Today's INR 1.96!   Full warfarin instructions 9/18: 6 mg; 9/19: 6 mg; 9/20: 6 mg; Otherwise No maintenance plan   Next INR check 9/21/2018    Indications   Lung transplant recipient (H) [Z94.2]  Deep vein thrombosis (DVT) (H) [I82.409] [I82.409]         September 2018 Details    Sun Mon Tue Wed Thu Fri Sat           1                 2               3               4               5               6               7               8                 9               10               11               12               13               14               15                 16               17               18      6 mg   See details      19      6 mg         20      6 mg         21            22                 23               24               25               26               27               28               29                 30                      Date Details   09/18 This INR check       Date of next INR:  9/21/2018         How to take your warfarin dose     To take:  6 mg Take 6 of the 1 mg tablets.

## 2018-09-20 ENCOUNTER — TELEPHONE (OUTPATIENT)
Dept: TRANSPLANT | Facility: CLINIC | Age: 58
End: 2018-09-20

## 2018-09-20 DIAGNOSIS — Z94.2 LUNG TRANSPLANT RECIPIENT (H): ICD-10-CM

## 2018-09-20 DIAGNOSIS — I48.0 PAROXYSMAL ATRIAL FIBRILLATION (H): ICD-10-CM

## 2018-09-20 RX ORDER — PENTOXIFYLLINE 400 MG/1
400 TABLET, EXTENDED RELEASE ORAL 2 TIMES DAILY
Qty: 60 TABLET | Refills: 11 | Status: SHIPPED | OUTPATIENT
Start: 2018-09-20 | End: 2019-06-06

## 2018-09-20 RX ORDER — METOPROLOL TARTRATE 25 MG/1
25 TABLET, FILM COATED ORAL 2 TIMES DAILY
Qty: 60 TABLET | Refills: 11 | Status: SHIPPED | OUTPATIENT
Start: 2018-09-20 | End: 2019-06-05

## 2018-09-21 DIAGNOSIS — Z94.2 LUNG TRANSPLANT RECIPIENT (H): ICD-10-CM

## 2018-09-21 DIAGNOSIS — Z94.2 LUNG REPLACED BY TRANSPLANT (H): ICD-10-CM

## 2018-09-21 DIAGNOSIS — B25.9 CMV (CYTOMEGALOVIRUS) (H): ICD-10-CM

## 2018-09-21 DIAGNOSIS — Z79.899 ENCOUNTER FOR LONG-TERM (CURRENT) USE OF HIGH-RISK MEDICATION: ICD-10-CM

## 2018-09-21 LAB — INR PPP: 3.31

## 2018-09-21 PROCEDURE — 80197 ASSAY OF TACROLIMUS: CPT | Performed by: INTERNAL MEDICINE

## 2018-09-24 ENCOUNTER — ANTICOAGULATION THERAPY VISIT (OUTPATIENT)
Dept: ANTICOAGULATION | Facility: CLINIC | Age: 58
End: 2018-09-24

## 2018-09-24 DIAGNOSIS — I82.409 DEEP VEIN THROMBOSIS (DVT) (H): ICD-10-CM

## 2018-09-24 DIAGNOSIS — Z94.2 LUNG TRANSPLANT RECIPIENT (H): ICD-10-CM

## 2018-09-24 LAB
TACROLIMUS BLD-MCNC: 9.5 UG/L (ref 5–15)
TME LAST DOSE: NORMAL H

## 2018-09-24 NOTE — PROGRESS NOTES
ANTICOAGULATION FOLLOW-UP CLINIC VISIT    Patient Name:  Kecia Blue  Date:  9/24/2018  Contact Type:  Telephone    SUBJECTIVE:     Patient Findings     Comments Results reported today are from 9/21.  See calender for reported dosing by patient.  Patient is reminded to call [facility] of she is not phoned on day that she goes in for an INR.            OBJECTIVE    INR   Date Value Ref Range Status   09/21/2018 3.31  Final       ASSESSMENT / PLAN  No question data found.  Anticoagulation Summary as of 9/24/2018     INR goal 2.0-3.0   Today's INR 3.31! (9/21/2018)   Warfarin maintenance plan No maintenance plan   Full warfarin instructions 9/24: 2 mg; Otherwise No maintenance plan   Next INR check 9/25/2018   Priority INR   Target end date     Indications   Lung transplant recipient (H) [Z94.2]  Deep vein thrombosis (DVT) (H) [I82.409] [I82.409]         Anticoagulation Episode Summary     INR check location Clinic Lab    Preferred lab     Send INR reminders to [facility]    Comments Extended duration orders until October per Magaly Castro RN- Provoked DVTHIPPA OK and  Verbal OK to speak with spouse and leave msg @ [phone].  labs @ [facility] starting ~ 5/25/18.  fax: [phone]. ph:[phone] BRONCH PLAN NOTE 6/6/18      Anticoagulation Care Providers     Provider Role Specialty Phone number    Claudine Ame Patel PA-C Responsible Pulmonary [phone]            See the Encounter Report to view Anticoagulation Flowsheet and Dosing Calendar (Go to Encounters tab in chart review, and find the Anticoagulation Therapy Visit)    Spoke with Kecia.     Katie Bush RN

## 2018-09-24 NOTE — MR AVS SNAPSHOT
Kecia Blue   9/24/2018   Anticoagulation Therapy Visit    Description:  57 year old female   Provider:  Katie Bush, RN   [facility]           INR as of 9/24/2018     Today's INR 3.31! (9/21/2018)      Anticoagulation Summary as of 9/24/2018     INR goal 2.0-3.0   Today's INR 3.31! (9/21/2018)   Full warfarin instructions 9/24: 2 mg; Otherwise No maintenance plan   Next INR check 9/25/2018    Indications   Lung transplant recipient (H) [Z94.2]  Deep vein thrombosis (DVT) (H) [I82.409] [I82.409]         September 2018 Details    Sun Mon Tue Wed Thu Fri Sat           1                 2               3               4               5               6               7               8                 9               10               11               12               13               14               15                 16               17               18               19               20               21               22                 23               24      2 mg   See details      25            26               27               28               29                 30                      Date Details   09/24 This INR check       Date of next INR:  9/25/2018         How to take your warfarin dose     To take:  2 mg Take 2 of the 1 mg tablets.

## 2018-09-25 ENCOUNTER — ANTICOAGULATION THERAPY VISIT (OUTPATIENT)
Dept: ANTICOAGULATION | Facility: CLINIC | Age: 58
End: 2018-09-25

## 2018-09-25 DIAGNOSIS — Z94.2 LUNG TRANSPLANT RECIPIENT (H): ICD-10-CM

## 2018-09-25 DIAGNOSIS — I82.409 DEEP VEIN THROMBOSIS (DVT) (H): ICD-10-CM

## 2018-09-25 LAB — INR PPP: 3.33

## 2018-09-25 NOTE — PROGRESS NOTES
Kecia, prograf level is 9.5 and within goal range.  No dose changes for now. Call me with questions. Magaly

## 2018-09-25 NOTE — PROGRESS NOTES
ANTICOAGULATION FOLLOW-UP CLINIC VISIT    Patient Name:  Kecia Blue  Date:  9/25/2018  Contact Type:  Telephone    SUBJECTIVE:     Patient Findings     Positives No Problem Findings           OBJECTIVE    INR   Date Value Ref Range Status   09/25/2018 3.33  Final       ASSESSMENT / PLAN  INR assessment SUPRA    Recheck INR In: 1 WEEK    INR Location Outside lab      Anticoagulation Summary as of 9/25/2018     INR goal 2.0-3.0   Today's INR 3.33!   Warfarin maintenance plan No maintenance plan   Full warfarin instructions 9/25: 4 mg; 9/26: 4 mg; 9/27: 4 mg; 9/28: 6 mg; 9/29: 4 mg; 9/30: 4 mg; 10/1: 6 mg; Otherwise No maintenance plan   Next INR check 10/2/2018   Priority INR   Target end date     Indications   Lung transplant recipient (H) [Z94.2]  Deep vein thrombosis (DVT) (H) [I82.409] [I82.409]         Anticoagulation Episode Summary     INR check location Clinic Lab    Preferred lab     Send INR reminders to [facility]    Comments Extended duration orders until October per Magaly Castro RN- Provoked DVTHIPPA OK and  Verbal OK to speak with spouse and leave msg @ [phone].  labs @ [facility] starting ~ 5/25/18.  fax: [phone]. ph:[phone] BRONCH PLAN NOTE 6/6/18      Anticoagulation Care Providers     Provider Role Specialty Phone number    Ronaldmessicaro Ame Patel PA-C Responsible Pulmonary [phone]            See the Encounter Report to view Anticoagulation Flowsheet and Dosing Calendar (Go to Encounters tab in chart review, and find the Anticoagulation Therapy Visit)  Spoke with patient.    Rufina Hanna RN

## 2018-09-25 NOTE — MR AVS SNAPSHOT
Kecia BRODERICK Blue   9/25/2018   Anticoagulation Therapy Visit    Description:  57 year old female   Provider:  Rufina Hanna RN   Department:  [facility]           INR as of 9/25/2018     Today's INR 3.33!      Anticoagulation Summary as of 9/25/2018     INR goal 2.0-3.0   Today's INR 3.33!   Full warfarin instructions 9/25: 4 mg; 9/26: 4 mg; 9/27: 4 mg; 9/28: 6 mg; 9/29: 4 mg; 9/30: 4 mg; 10/1: 6 mg; Otherwise No maintenance plan   Next INR check 10/2/2018    Indications   Lung transplant recipient (H) [Z94.2]  Deep vein thrombosis (DVT) (H) [I82.409] [I82.409]         September 2018 Details    Sun Mon Tue Wed Thu Fri Sat           1                 2               3               4               5               6               7               8                 9               10               11               12               13               14               15                 16               17               18               19               20               21               22                 23               24               25      4 mg   See details      26      4 mg         27      4 mg         28      6 mg         29      4 mg           30      4 mg                Date Details   09/25 This INR check               How to take your warfarin dose     To take:  4 mg Take 4 of the 1 mg tablets.    To take:  6 mg Take 6 of the 1 mg tablets.           October 2018 Details    Sun Mon Tue Wed Thu Fri Sat      1      6 mg         2            3               4               5               6                 7               8               9               10               11               12               13                 14               15               16               17               18               19               20                 21               22               23               24               25               26               27                 28               29               30                31                   Date Details   No additional details    Date of next INR:  10/2/2018         How to take your warfarin dose     To take:  6 mg Take 6 of the 1 mg tablets.

## 2018-09-26 ENCOUNTER — TELEPHONE (OUTPATIENT)
Dept: PULMONOLOGY | Facility: CLINIC | Age: 58
End: 2018-09-26

## 2018-09-26 NOTE — TELEPHONE ENCOUNTER
Spoke with patient to schedule procedure with Dr. Alana Lin   Procedure was scheduled on 10/25 at [facility] OR  Patient will have H&P with: Pulmonary, [facility] on 10/24  Patient is aware a / is needed day of surgery.   Surgery letter was sent via WebMD, patient has my direct contact information for any further questions.

## 2018-09-27 DIAGNOSIS — K30 DELAYED GASTRIC EMPTYING: ICD-10-CM

## 2018-09-27 RX ORDER — METOCLOPRAMIDE 5 MG/1
5 TABLET ORAL 3 TIMES DAILY
Qty: 90 TABLET | Refills: 11 | Status: SHIPPED | OUTPATIENT
Start: 2018-09-27 | End: 2018-10-09

## 2018-09-28 LAB
MYCOBACTERIUM SPEC CULT: NORMAL
MYCOBACTERIUM SPEC CULT: NORMAL
SPECIMEN SOURCE: NORMAL

## 2018-10-02 ENCOUNTER — ANTICOAGULATION THERAPY VISIT (OUTPATIENT)
Dept: ANTICOAGULATION | Facility: CLINIC | Age: 58
End: 2018-10-02

## 2018-10-02 ENCOUNTER — TELEPHONE (OUTPATIENT)
Dept: TRANSPLANT | Facility: CLINIC | Age: 58
End: 2018-10-02

## 2018-10-02 DIAGNOSIS — I82.409 DEEP VEIN THROMBOSIS (DVT) (H): ICD-10-CM

## 2018-10-02 DIAGNOSIS — Z94.2 LUNG TRANSPLANT RECIPIENT (H): ICD-10-CM

## 2018-10-02 NOTE — TELEPHONE ENCOUNTER
"Patient calls inquiring about stop date for coumadin. Per patient, 6 month course will be complete 10/22/18. Wonders about getting an US before stopping. Will check with provider then arrange US if so. Patient is in clinic 10/24/18 with bronch 10/25/18.    She also reports few days of runny nose. Cough with clear \"phlegm.\" VSS per patient. Denies pain. Suggested patient could try Claritin, Allegra, or Zyrtec (all without \"D\"). Requested she call back in a few days or sooner if worsening symptoms. Patient agreed with plan.   "

## 2018-10-04 ENCOUNTER — ANTICOAGULATION THERAPY VISIT (OUTPATIENT)
Dept: ANTICOAGULATION | Facility: CLINIC | Age: 58
End: 2018-10-04

## 2018-10-04 DIAGNOSIS — I82.409 DEEP VEIN THROMBOSIS (DVT) (H): ICD-10-CM

## 2018-10-04 DIAGNOSIS — Z94.2 LUNG TRANSPLANT RECIPIENT (H): ICD-10-CM

## 2018-10-04 LAB — INR PPP: 3.03

## 2018-10-04 NOTE — PROGRESS NOTES
ANTICOAGULATION FOLLOW-UP CLINIC VISIT    Patient Name:  Kecia Blue  Date:  10/4/2018  Contact Type:  Telephone    SUBJECTIVE:     Patient Findings     Comments Kceia has been having some allergy symptoms and started taking Allegra.           OBJECTIVE    INR   Date Value Ref Range Status   10/04/2018 3.03  Final       ASSESSMENT / PLAN  No question data found.  Anticoagulation Summary as of 10/4/2018     INR goal 2.0-3.0   Today's INR 3.03!   Warfarin maintenance plan No maintenance plan   Full warfarin instructions 10/4: 4 mg; 10/5: 6 mg; 10/6: 4 mg; 10/7: 4 mg; 10/8: 6 mg; 10/9: 4 mg; 10/10: 4 mg; Otherwise No maintenance plan   Next INR check 10/11/2018   Priority INR   Target end date     Indications   Lung transplant recipient (H) [Z94.2]  Deep vein thrombosis (DVT) (H) [I82.409] [I82.409]         Anticoagulation Episode Summary     INR check location Clinic Lab    Preferred lab     Send INR reminders to [facility]    Comments Extended duration orders until October per Magaly Castro RN- Provoked DVTHIPPA OK and  Verbal OK to speak with spouse and leave msg @ [phone].  labs @ [facility] starting ~ 5/25/18.  fax: [phone]. ph:[phone] BRONCH PLAN NOTE 6/6/18      Anticoagulation Care Providers     Provider Role Specialty Phone number    Ame Chow PA-C Responsible Pulmonary [phone]            See the Encounter Report to view Anticoagulation Flowsheet and Dosing Calendar (Go to Encounters tab in chart review, and find the Anticoagulation Therapy Visit)    Spoke with Kecia.     Katie Bush RN

## 2018-10-04 NOTE — MR AVS SNAPSHOT
Kecia Blue   10/4/2018   Anticoagulation Therapy Visit    Description:  57 year old female   Provider:  Katie Bush RN   Department:  [facility]           INR as of 10/4/2018     Today's INR 3.03!      Anticoagulation Summary as of 10/4/2018     INR goal 2.0-3.0   Today's INR 3.03!   Full warfarin instructions 10/4: 4 mg; 10/5: 6 mg; 10/6: 4 mg; 10/7: 4 mg; 10/8: 6 mg; 10/9: 4 mg; 10/10: 4 mg; Otherwise No maintenance plan   Next INR check 10/11/2018    Indications   Lung transplant recipient (H) [Z94.2]  Deep vein thrombosis (DVT) (H) [I82.409] [I82.409]         October 2018 Details    Sun Mon Tue Wed Thu Fri Sat      1               2               3               4      4 mg   See details      5      6 mg         6      4 mg           7      4 mg         8      6 mg         9      4 mg         10      4 mg         11            12               13                 14               15               16               17               18               19               20                 21               22               23               24               25               26               27                 28               29               30               31                   Date Details   10/04 This INR check       Date of next INR:  10/11/2018         How to take your warfarin dose     To take:  4 mg Take 4 of the 1 mg tablets.    To take:  6 mg Take 6 of the 1 mg tablets.

## 2018-10-08 ENCOUNTER — TELEPHONE (OUTPATIENT)
Dept: TRANSPLANT | Facility: CLINIC | Age: 58
End: 2018-10-08

## 2018-10-08 DIAGNOSIS — Z94.2 LUNG REPLACED BY TRANSPLANT (H): Primary | ICD-10-CM

## 2018-10-08 NOTE — TELEPHONE ENCOUNTER
"Patient calls to report feeling like her bronchial stent has moved. She feels like \"crap\" for the past few days with symptoms of wheezing, chest pain, and frequent cough that wakes her up at night with occasional clear sputum. VSS. No fever. Oxygen 98-99%. No energy. Tried allegra which isn't helping. Discussed with Dr. Christensen. Plan to see patient in clinic tomorrow. Per Dr Christensen, have patient hold her coumadin tonight in case we move up bronch and need to start bridging. Patient agrees with plan. Message also sent to Dr. Lin who does her OR bronchs. Will ask tomorrow about ultrasound to evaluate DVT and further need for coumadin.   "

## 2018-10-09 ENCOUNTER — OFFICE VISIT (OUTPATIENT)
Dept: TRANSPLANT | Facility: CLINIC | Age: 58
End: 2018-10-09
Attending: INTERNAL MEDICINE
Payer: COMMERCIAL

## 2018-10-09 ENCOUNTER — RESULTS ONLY (OUTPATIENT)
Dept: OTHER | Facility: CLINIC | Age: 58
End: 2018-10-09

## 2018-10-09 ENCOUNTER — RADIANT APPOINTMENT (OUTPATIENT)
Dept: GENERAL RADIOLOGY | Facility: CLINIC | Age: 58
End: 2018-10-09
Attending: INTERNAL MEDICINE
Payer: COMMERCIAL

## 2018-10-09 ENCOUNTER — HOSPITAL ENCOUNTER (OUTPATIENT)
Facility: CLINIC | Age: 58
Setting detail: SPECIMEN
Discharge: HOME | End: 2018-10-09
Admitting: INTERNAL MEDICINE
Payer: COMMERCIAL

## 2018-10-09 ENCOUNTER — RADIANT APPOINTMENT (OUTPATIENT)
Dept: ULTRASOUND IMAGING | Facility: CLINIC | Age: 58
End: 2018-10-09
Attending: INTERNAL MEDICINE
Payer: COMMERCIAL

## 2018-10-09 ENCOUNTER — ANTICOAGULATION THERAPY VISIT (OUTPATIENT)
Dept: ANTICOAGULATION | Facility: CLINIC | Age: 58
End: 2018-10-09

## 2018-10-09 VITALS
TEMPERATURE: 97.3 F | HEIGHT: 64 IN | WEIGHT: 116.8 LBS | SYSTOLIC BLOOD PRESSURE: 128 MMHG | OXYGEN SATURATION: 100 % | DIASTOLIC BLOOD PRESSURE: 77 MMHG | BODY MASS INDEX: 19.94 KG/M2 | HEART RATE: 97 BPM

## 2018-10-09 DIAGNOSIS — Z94.2 LUNG TRANSPLANT RECIPIENT (H): ICD-10-CM

## 2018-10-09 DIAGNOSIS — Z94.2 LUNG REPLACED BY TRANSPLANT (H): ICD-10-CM

## 2018-10-09 DIAGNOSIS — I82.622 DEEP VEIN THROMBOSIS (DVT) OF OTHER VEIN OF LEFT UPPER EXTREMITY, UNSPECIFIED CHRONICITY (H): ICD-10-CM

## 2018-10-09 DIAGNOSIS — K21.9 GASTROESOPHAGEAL REFLUX DISEASE WITHOUT ESOPHAGITIS: ICD-10-CM

## 2018-10-09 DIAGNOSIS — I82.622 DEEP VEIN THROMBOSIS (DVT) OF OTHER VEIN OF LEFT UPPER EXTREMITY, UNSPECIFIED CHRONICITY (H): Primary | ICD-10-CM

## 2018-10-09 DIAGNOSIS — J84.9 ILD (INTERSTITIAL LUNG DISEASE) (H): ICD-10-CM

## 2018-10-09 DIAGNOSIS — Z79.899 ENCOUNTER FOR LONG-TERM (CURRENT) USE OF HIGH-RISK MEDICATION: ICD-10-CM

## 2018-10-09 DIAGNOSIS — K30 DELAYED GASTRIC EMPTYING: ICD-10-CM

## 2018-10-09 DIAGNOSIS — I82.409 DEEP VEIN THROMBOSIS (DVT) (H): ICD-10-CM

## 2018-10-09 LAB
ALBUMIN SERPL-MCNC: 2.9 G/DL (ref 3.4–5)
ALP SERPL-CCNC: 84 U/L (ref 40–150)
ALT SERPL W P-5'-P-CCNC: 12 U/L (ref 0–50)
ANION GAP SERPL CALCULATED.3IONS-SCNC: 10 MMOL/L (ref 3–14)
ANISOCYTOSIS BLD QL SMEAR: SLIGHT
AST SERPL W P-5'-P-CCNC: 12 U/L (ref 0–45)
BASOPHILS # BLD AUTO: 0.1 10E9/L (ref 0–0.2)
BASOPHILS NFR BLD AUTO: 0.9 %
BILIRUB SERPL-MCNC: 0.2 MG/DL (ref 0.2–1.3)
BUN SERPL-MCNC: 18 MG/DL (ref 7–30)
CALCIUM SERPL-MCNC: 9 MG/DL (ref 8.5–10.1)
CHLORIDE SERPL-SCNC: 100 MMOL/L (ref 94–109)
CO2 SERPL-SCNC: 24 MMOL/L (ref 20–32)
CREAT SERPL-MCNC: 1.29 MG/DL (ref 0.52–1.04)
DIFFERENTIAL METHOD BLD: ABNORMAL
EOSINOPHIL # BLD AUTO: 0 10E9/L (ref 0–0.7)
EOSINOPHIL NFR BLD AUTO: 0 %
ERYTHROCYTE [DISTWIDTH] IN BLOOD BY AUTOMATED COUNT: 13.9 % (ref 10–15)
EXPTIME-PRE: 6.96 SEC
FEF2575-%PRED-PRE: 15 %
FEF2575-PRE: 0.38 L/SEC
FEF2575-PRED: 2.43 L/SEC
FEFMAX-%PRED-PRE: 36 %
FEFMAX-PRE: 2.33 L/SEC
FEFMAX-PRED: 6.43 L/SEC
FEV1-%PRED-PRE: 31 %
FEV1-PRE: 0.82 L
FEV1FEV6-PRE: 39 %
FEV1FEV6-PRED: 81 %
FEV1FVC-PRE: 44 %
FEV1FVC-PRED: 79 %
FIFMAX-PRE: 1.74 L/SEC
FVC-%PRED-PRE: 57 %
FVC-PRE: 1.86 L
FVC-PRED: 3.24 L
GFR SERPL CREATININE-BSD FRML MDRD: 43 ML/MIN/1.7M2
GLUCOSE SERPL-MCNC: 151 MG/DL (ref 70–99)
HCT VFR BLD AUTO: 27 % (ref 35–47)
HGB BLD-MCNC: 7.9 G/DL (ref 11.7–15.7)
INR PPP: 2.83 (ref 0.86–1.14)
LYMPHOCYTES # BLD AUTO: 0.3 10E9/L (ref 0.8–5.3)
LYMPHOCYTES NFR BLD AUTO: 2.6 %
MACROCYTES BLD QL SMEAR: PRESENT
MCH RBC QN AUTO: 30.2 PG (ref 26.5–33)
MCHC RBC AUTO-ENTMCNC: 29.3 G/DL (ref 31.5–36.5)
MCV RBC AUTO: 103 FL (ref 78–100)
METAMYELOCYTES # BLD: 0.2 10E9/L
METAMYELOCYTES NFR BLD MANUAL: 1.7 %
MONOCYTES # BLD AUTO: 0.3 10E9/L (ref 0–1.3)
MONOCYTES NFR BLD AUTO: 2.6 %
NEUTROPHILS # BLD AUTO: 10.3 10E9/L (ref 1.6–8.3)
NEUTROPHILS NFR BLD AUTO: 92.2 %
PLATELET # BLD AUTO: 520 10E9/L (ref 150–450)
PLATELET # BLD EST: ABNORMAL 10*3/UL
POTASSIUM SERPL-SCNC: 4.4 MMOL/L (ref 3.4–5.3)
PROT SERPL-MCNC: 7.4 G/DL (ref 6.8–8.8)
RBC # BLD AUTO: 2.62 10E12/L (ref 3.8–5.2)
SODIUM SERPL-SCNC: 134 MMOL/L (ref 133–144)
WBC # BLD AUTO: 11.2 10E9/L (ref 4–11)

## 2018-10-09 PROCEDURE — G0463 HOSPITAL OUTPT CLINIC VISIT: HCPCS | Mod: ZF

## 2018-10-09 PROCEDURE — 80053 COMPREHEN METABOLIC PANEL: CPT | Performed by: INTERNAL MEDICINE

## 2018-10-09 PROCEDURE — 85025 COMPLETE CBC W/AUTO DIFF WBC: CPT | Performed by: INTERNAL MEDICINE

## 2018-10-09 PROCEDURE — 86832 HLA CLASS I HIGH DEFIN QUAL: CPT | Performed by: INTERNAL MEDICINE

## 2018-10-09 PROCEDURE — 86833 HLA CLASS II HIGH DEFIN QUAL: CPT | Performed by: INTERNAL MEDICINE

## 2018-10-09 PROCEDURE — 36415 COLL VENOUS BLD VENIPUNCTURE: CPT | Performed by: INTERNAL MEDICINE

## 2018-10-09 PROCEDURE — 85610 PROTHROMBIN TIME: CPT | Performed by: INTERNAL MEDICINE

## 2018-10-09 RX ORDER — METOCLOPRAMIDE 5 MG/1
5 TABLET ORAL 2 TIMES DAILY
Qty: 60 TABLET | Refills: 11 | COMMUNITY
Start: 2018-10-09 | End: 2018-11-19

## 2018-10-09 RX ORDER — DRONABINOL 5 MG/1
5 CAPSULE ORAL 2 TIMES DAILY
Qty: 60 CAPSULE | Refills: 1 | Status: SHIPPED | OUTPATIENT
Start: 2018-10-09 | End: 2018-11-19

## 2018-10-09 ASSESSMENT — PAIN SCALES - GENERAL: PAINLEVEL: NO PAIN (0)

## 2018-10-09 NOTE — PROGRESS NOTES
"Reason for Visit  Kecia Blue is a 57-year-old female who is being seen for RECHECK (Post Lung TXP)      Lung TX HPI  Kecia Blue is a 57-year-old female with a history of dermatomyositis, seronegative rheumatoid arthritis, interstitial lung disease, and pulmonary hypertension who was admitted to the hospital with acute worsening of chronic hypoxic respiratory failure in 02/2018 and progressed to [facility] for right heart failure and subsequently underwent bilateral sequential lung transplant on 03/01/2018.  Her post-transplant course was generally uncomplicated; however, she needed pleural effusion tap on the right side later.      Interval HPI   The patient is feeling \"crappy\". Her breathing has been \"okay\", but coughing has gotten progressively worse. She is wheezing. After stent was placed on 9/10/2018, her cough was normal for about a week and then got progressively worse. No increase in shortness of breath, but the cough can cause her to breath heavier. She gets cough spasms during the day and night. The patient can wake up 3 or 4 times at night due to cough. The cough can continue until she vomits. She vomited previously eaten food twice last week. It is hard to breath through her nose.     Sunday, the patient had a pressure-like sensation about her chest. Her bowel movements are loose not formed and will have a couple of bowel movements per day. She states it is not like C. difficile, though. The patient always has chills, but no sweats or fevers. She has a poor appetite.     No heartburn, headaches, trouble passing urine, or lower extremity edema.     The patient does have right knee edema, but believes she needs a Cortisone injection in right knee. Her left knee pain and edema improved with a Cortisone injection. She tries to do leg exercises when her knees do not hurt. The patient has not seen a provider for her knees recently and has not had fluid evaluated. CXR did reveal arthritis in knees, " per patient. Heat and ice help her knee pain.      Current Outpatient Prescriptions   Medication     ACETAMINOPHEN PO     albuterol (PROAIR HFA/PROVENTIL HFA/VENTOLIN HFA) 108 (90 Base) MCG/ACT Inhaler     calcium-vitamin D (CALTRATE) 600-400 MG-UNIT per tablet     magnesium oxide (MAG-OX) 400 MG tablet     metoclopramide (REGLAN) 5 MG tablet     metoprolol tartrate (LOPRESSOR) 25 MG tablet     mycophenolate (GENERIC EQUIVALENT) 250 MG capsule     pantoprazole (PROTONIX) 40 MG EC tablet     pentoxifylline (TRENTAL) 400 MG CR tablet     predniSONE (DELTASONE) 2.5 MG tablet     predniSONE (DELTASONE) 5 MG tablet     sulfamethoxazole-trimethoprim (BACTRIM DS/SEPTRA DS) 800-160 MG per tablet     tacrolimus (GENERIC EQUIVALENT) 0.5 MG capsule     tacrolimus (GENERIC EQUIVALENT) 1 MG capsule     valGANciclovir (VALCYTE) 450 MG tablet     enoxaparin (LOVENOX) 60 MG/0.6ML injection     levalbuterol (XOPENEX HFA) 45 MCG/ACT Inhaler     multivitamin, therapeutic with minerals (THERA-VIT-M) TABS tablet     order for DME     order for DME     warfarin (COUMADIN) 1 MG tablet     No current facility-administered medications for this visit.        No Known Allergies      Past Medical History:   Diagnosis Date     Antisynthetase syndrome (H) 2014     Chronic cough      Chronic infection     recent C diff  8/18     Dehydration 8/1/2018     Dermatomyositis (H)      Dysplasia of cervix, low grade (ESTRADA 1)      ILD (interstitial lung disease) (H)      Osteopenia      PONV (postoperative nausea and vomiting)      Pulmonary hypertension (H)      Raynaud's disease      Seronegative rheumatoid arthritis (H)        Past Surgical History:   Procedure Laterality Date     BRONCHOSCOPY (RIGID OR FLEXIBLE), DIAGNOSTIC N/A 4/10/2018    Procedure: COMBINED BRONCHOSCOPY (RIGID OR FLEXIBLE), LAVAGE;;  Surgeon: Mariposa Donohue MD;  Location:  GI     BRONCHOSCOPY FLEXIBLE N/A 3/13/2018    Procedure: BRONCHOSCOPY FLEXIBLE;  Flexible Bronchoscopy  ;  Surgeon: Gissell Sanchez MD;  Location: [facility]     BRONCHOSCOPY FLEXIBLE N/A 5/9/2018    Procedure: BRONCHOSCOPY FLEXIBLE;;  Surgeon: Wilber Lin MD;  Location: [facility]     BRONCHOSCOPY FLEXIBLE AND RIGID N/A 9/10/2018    Procedure: BRONCHOSCOPY FLEXIBLE AND RIGID;  Flexible and Rigid Bronchoscopy with Balloon Dilation, tissue debulking with cryo, and Right mainstem bronchus stent placement;  Surgeon: Wilber Lin MD;  Location: [facility]     BRONCHOSCOPY RIGID N/A 6/6/2018    Procedure: BRONCHOSCOPY RIGID;;  Surgeon: Lopez Macias MD;  Location: [facility]     BRONCHOSCOPY, DILATE BRONCHUS, STENT BRONCHUS, COMBINED N/A 6/11/2018    Procedure: COMBINED BRONCHOSCOPY, DILATE BRONCHUS, STENT BRONCHUS;  Flexible Bronchoscopy, Balloon Dilation, Bronchial Washings;  Surgeon: Wilber Lin MD;  [facility]     BRONCHOSCOPY, DILATE BRONCHUS, STENT BRONCHUS, COMBINED Right 7/10/2018    Procedure: COMBINED BRONCHOSCOPY, DILATE BRONCHUS, STENT BRONCHUS;  Flexible Bronchoscopy, Balloon Dilation, Bronchial Washings  ;  Surgeon: Wilber Lin MD;  Location: [facility]     BRONCHOSCOPY, DILATE BRONCHUS, STENT BRONCHUS, COMBINED N/A 8/2/2018    Procedure: COMBINED BRONCHOSCOPY, DILATE BRONCHUS, STENT BRONCHUS;  Flexible Bronchoscopy, Bronchial Washings, Balloon Dilation;  Surgeon: Wilber Lin MD;  Location: [facility]     BRONCHOSCOPY, DILATE BRONCHUS, STENT BRONCHUS, COMBINED N/A 8/20/2018    Procedure: COMBINED BRONCHOSCOPY, DILATE BRONCHUS, STENT BRONCHUS;  Flexible Bronchoscopy, Balloon Dilation;  Surgeon: Wilber Lin MD;  [facility]     ENT SURGERY      tonsillectomy as a child     INSERT EXTRACORPORAL MEMBRANE OXYGENATOR ADULT (OUTSIDE OR) N/A 2/27/2018    Procedure: INSERT EXTRACORPORAL MEMBRANE OXYGENATOR ADULT (OUTSIDE OR);  INSERT EXTRACORPORAL MEMBRANE OXYGENATOR ADULT (OUTSIDE OR) ;  Surgeon: Toby Hernandez MD;  [facility]     no prior  "surgery       REMOVE EXTRACORPORAL MEMBRANE OXYGENATOR ADULT N/A 3/3/2018    Procedure: REMOVE EXTRACORPORAL MEMBRANE OXYGENATOR ADULT;  Removal of Right Femoral Venous and Right Axillary Arterial Extracorporeal Membrane Oxygenator;  Surgeon: Toby Hernandez MD;  Location: [facility]     TRANSPLANT LUNG RECIPIENT SINGLE X2 Bilateral 3/1/2018    Procedure: TRANSPLANT LUNG RECIPIENT SINGLE X2;  Median Sternotomy, Extracorporeal Membrane Oxygenator, bilateral sequential lung transplant;  Surgeon: Toby Hernandez MD;  Location: [facility]       Social History     Social History     Marital status:      Spouse name: N/A     Number of children: N/A     Years of education: N/A     Occupational History     Not on file.     Social History Main Topics     Smoking status: Never Smoker     Smokeless tobacco: Never Used     Alcohol use No     Drug use: No     Sexual activity: Not on file     Other Topics Concern     Parent/Sibling W/ Cabg, Mi Or Angioplasty Before 65f 55m? No     Social History Narrative       Family History   Problem Relation Age of Onset     Hypertension Mother      Arthritis Mother      Pancreatic Cancer Father      Diabetes Father        Pulmonary ROS  Constitutional- Negative  Eyes- Negative  Ear, nose and throat- Negative  Cardiac- Negative  Pulm- See HPI  GI- Positive. Not formed stools, couple times a day.  Genitourinary- Negative  Musculoskeletal- Positive. Bilateral knee pain with right knee edema.  Neurology- Negative  Dermatology- Negative  Endocrine- Negative  Lymphatic- Negative  Psychiatry- Negative  A complete ROS was otherwise negative except as noted in the HPI.      /77  Pulse 97  Temp 97.3  F (36.3  C) (Oral)  Ht 1.626 m (5' 4\")  Wt 53 kg (116 lb 12.8 oz)  LMP 06/07/2014 (Exact Date)  SpO2 100%  BMI 20.05 kg/m2  Physical Exam  GENERAL APPEARANCE: Alert, Oriented x3. Not in distress.  EYES: PERRL, EOMI  HENT: Nasal mucosa with no hyperemia and no edema, no nasal " polyps.  MOUTH: Oral mucosa is moist, without any lesions, no tonsillar enlargement, no oropharyngeal exudate.  NECK: Supple, no masses, no thyromegaly.  LYMPHATICS: No significant axillary, cervical, or supraclavicular nodes.  RESP: Normal percussion, good air flow throughout Left lung. Rt. lung insp wheeze heard and poor exhalation.  CV: Normal S1, S2, regular rhythm, normal rate, no rub, no murmur,  no gallop. Trace right lower extremity edema, no left lower extremity edema.  ABDOMEN: Bowel sounds normal, soft, nontender, no HSM or masses.  MS: Extremities normal, no clubbing, no cyanosis.   SKIN: No rash on limited exam.  NEURO: Mentation intact, speech normal, normal strength and tone, normal gait, and stance.  PSYCH: Mentation appears normal, and affect normal/bright.      Results  Recent Results (from the past 168 hour(s))   INR    Collection Time: 10/04/18 12:00 AM   Result Value Ref Range    INR 3.03    CBC with platelets differential    Collection Time: 10/09/18 12:57 PM   Result Value Ref Range    WBC 11.2 (H) 4.0 - 11.0 10e9/L    RBC Count 2.62 (L) 3.8 - 5.2 10e12/L    Hemoglobin 7.9 (L) 11.7 - 15.7 g/dL    Hematocrit 27.0 (L) 35.0 - 47.0 %     (H) 78 - 100 fl    MCH 30.2 26.5 - 33.0 pg    MCHC 29.3 (L) 31.5 - 36.5 g/dL    RDW 13.9 10.0 - 15.0 %    Platelet Count 520 (H) 150 - 450 10e9/L    Diff Method Manual Differential     % Neutrophils 92.2 %    % Lymphocytes 2.6 %    % Monocytes 2.6 %    % Eosinophils 0.0 %    % Basophils 0.9 %    % Metamyelocytes 1.7 %    Absolute Neutrophil 10.3 (H) 1.6 - 8.3 10e9/L    Absolute Lymphocytes 0.3 (L) 0.8 - 5.3 10e9/L    Absolute Monocytes 0.3 0.0 - 1.3 10e9/L    Absolute Eosinophils 0.0 0.0 - 0.7 10e9/L    Absolute Basophils 0.1 0.0 - 0.2 10e9/L    Absolute Metamyelocytes 0.2 (H) 0 10e9/L    Anisocytosis Slight     Macrocytes Present     Platelet Estimate Confirming automated cell count    Comprehensive metabolic panel    Collection Time: 10/09/18 12:57 PM    Result Value Ref Range    Sodium 134 133 - 144 mmol/L    Potassium 4.4 3.4 - 5.3 mmol/L    Chloride 100 94 - 109 mmol/L    Carbon Dioxide 24 20 - 32 mmol/L    Anion Gap 10 3 - 14 mmol/L    Glucose 151 (H) 70 - 99 mg/dL    Urea Nitrogen 18 7 - 30 mg/dL    Creatinine 1.29 (H) 0.52 - 1.04 mg/dL    GFR Estimate 43 (L) >60 mL/min/1.7m2    GFR Estimate If Black 52 (L) >60 mL/min/1.7m2    Calcium 9.0 8.5 - 10.1 mg/dL    Bilirubin Total 0.2 0.2 - 1.3 mg/dL    Albumin 2.9 (L) 3.4 - 5.0 g/dL    Protein Total 7.4 6.8 - 8.8 g/dL    Alkaline Phosphatase 84 40 - 150 U/L    ALT 12 0 - 50 U/L    AST 12 0 - 45 U/L   INR    Collection Time: 10/09/18 12:57 PM   Result Value Ref Range    INR 2.83 (H) 0.86 - 1.14   General PFT Lab (Please always keep checked)    Collection Time: 10/09/18  1:47 PM   Result Value Ref Range    FVC-Pred 3.24 L    FVC-Pre 1.86 L    FVC-%Pred-Pre 57 %    FEV1-Pre 0.82 L    FEV1-%Pred-Pre 31 %    FEV1FVC-Pred 79 %    FEV1FVC-Pre 44 %    FEFMax-Pred 6.43 L/sec    FEFMax-Pre 2.33 L/sec    FEFMax-%Pred-Pre 36 %    FEF2575-Pred 2.43 L/sec    FEF2575-Pre 0.38 L/sec    RGB2240-%Pred-Pre 15 %    ExpTime-Pre 6.96 sec    FIFMax-Pre 1.74 L/sec    FEV1FEV6-Pred 81 %    FEV1FEV6-Pre 39 %        Results as noted above.    PFT Interpretation:  Severe obstructive ventilatory defect along with severe restriction.  FEV1 has declined by 450ml.  Valid Maneuver    CXR (10/9/2018), personally reviewed by me: The Lung fields are clear. No effusion. Cardiac silhouette is WNL.      Assessment and Plan: Kecia Blue is a 57-year-old female with a history of dermatomyositis, seronegative rheumatoid arthritis, interstitial lung disease, and pulmonary hypertension who was admitted to the hospital with acute worsening of chronic hypoxic respiratory failure in 02/2018 and progressed [facility] for right heart failure and subsequently underwent bilateral sequential lung transplant on 03/01/2018. Her post-transplant course was generally  uncomplicated; however, she needed pleural effusion tap on the right side later.     # Bilateral Lung Transplant: She severe restriction with low PFT's. Etiology unclear - ?chest wall restriction. Possible diaphram weakness/paralysis. Deconditioning might be playing a role.  Current IS regimen:   - Tacrolimus with a goal of 10-15 nanograms/mL, mycophenolate 1500 mg twice a day and prednisone taper per protocol.    DSA positive since 5/8/18 - it is unchanged on 5/16/18. It is DQB7.  9/4/2018: FEV1 is stable. CXR is stable. Sx are slightly worse. Lung exam consistent with worsening narrowing on Rt main stem.    - No changes in IS regimen. Has had TBBx only once since ltx and it was on 4/2018.  10/9/2018: FEV1 today (0.82) worse from previous evaluation (1.27). She has had a progressively worsening cough a week after stent placed on 9/10/2018. Based on CXR the stent has moved and is partially blocking left mainstem too. It is possible that the stent is irritating the trachea and causing her cough. She is scheduled to have the stent removed on Thursday at 13:30.   - She will come to OR desk at 11:30 AM.    # Right Main Bronchial Stenosis S/P Dilatation: Last Dilation on 8/20, next scheduled in one week. Still a good candidate for the dilation and possible stenting.  10/9/2018: Last stent placed on 9/10/18. It has moved, plan as above.  - Continue pentoxyfilline.    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 On Valcyte 450mg/day. Last CMV was better.  H/o C. diff colitis: Finished a 10day course of Oral Vancomycin.  High risk donor: Will need labs in 12 months.    # Hypomagnesemia: The patient remains on magnesium replacement. Her magnesium level today is acceptable.    # Provoked Left Upper Extremity Clot and Right IJ Clot (3/7/18):  The plan was 3 months. Repeat US on 4/16 with decreased subclavian thrombus and resolved axillary thrombus, but new (vs previously obscured) right internal jugular clot. On Warfarin, goal INR 2 - 3. Plan to continue anticoag till 10/22/18.  10/9/2018: Will repeat US to evaluate Rt. internal jugular thrombus, if the clot is stable or resolved then will stop Warfarin. If the clot is worse will switch to DOAC considering her renal function.    For the procedure tomorrow:  Take Vit K 10mg now, INR tomorrow and, if not within appropriate level, will repeat Vit K and check INR on Thursday.    # Dermatomyositis with Raynaud's Phenomenon: Followed by rheumatology.  - Myositis panel is positive.    # Hoarse Voice: She does complain of some hoarseness and is scheduled to see ENT, has vocal cord polyp seen at there last bronch.    # CKD, Stage III: Cr has increased again.   - Will follow for now.    # Anemia: with hgb of 7.6 down from baseline 8-9s. No s/s of bleeding. Received 1 U PRBC during admission, suspect possible occult bleeding from C diff. Hgb is stable at 7.5. Iron panel, folate/B12 WNL.  - Monitor    # Nausea and Early Satiety: Worsened with the Bactrim. Wants to eat, but gets full quickly. No epigastric pain, but notes increased cough and rhinorrhea. CXR demonstrates ongoing dilated loops of bowel, stable from prior. CT abd pelvis on 8/20/18 mildly dilated loops of SB.  9/4/2018: Wean reglan to BID and then to off if possible. Can use it on a as needed basis. She is unsure if reglan helped or not.   - Cont pantoprazole to 40 mg BID  - Gastroenterology referral pending on 12/2018.  10/9/2018: She continues to have a poor appetite. On Reglan BID, the nausea has not been the primary limiting factor.   - Prescribed Marinol 5 mg twice a day. Advised to watch out for depression.    # HTN: Well controlled on Metoprolol 25mg BID.    # Bilateral Knee Pain: She has bilateral knee pain, which is due to arthritis, per  patient. She received a Cortisone injection in left knee recently and this helped her left knee pain and edema. Right knee is painful and edematous today (10/9/2018).  - If this continues to be a problem might benefit from Orthopedic input.      RTC in 3 to 4 weeks with labs including spirometry and xray due to drop in FEV1.      Scribe Disclosure:   I, Jacinto Duong, am serving as a scribe; to document services personally performed by Tarik Christensen MD based on data collection and the provider's statements to me.     Provider Disclosure:  I agree with above History, Review of Systems, Physical exam and Plan. I have reviewed the content of the documentation and have edited it as needed. I have personally performed the services documented here and the documentation accurately represents those services and the decisions I have made.      Electronically signed by:  Tarik Christensen MD.

## 2018-10-09 NOTE — PATIENT INSTRUCTIONS
Patient Instructions  1. OR bronch on Thursday at 1:30 PM to remove and/or replace stent   2. Ultrasound today or tomorrow  3. Hold coumadin   4. Take 10 mg of vitamin k today. Check INR tomorrow. If you don't have 10 mg, call the office [phone]   5. Start Marinol for you appetite     Next transplant clinic appointment: 10/24 with CXR, labs and PFTs  Next lab draw: tomorrow . Maybe Thursday. Then weekly labs    ~~~~~~~~~~~~~~~~~~~~~~~~~    Thoracic Transplant Office phone [phone], fax [phone]    Office Hours 8:30 - 5:00     For after-hours urgent issues, please dial [phone], and ask to speak with the Thoracic Transplant Coordinator  On-Call, pager [phone].  --------------------  To expedite your medication refill(s), please contact your pharmacy and have them fax a refill request to: [phone]  .   *Please allow 3 business days for routine medication refills.  *Please allow 5 business days for controlled substance medication refills.    **For Diabetic medications and supplies refill(s), please contact your pharmacy and have them  Contact your Endocrine team.  --------------------  For scheduling appointments call [facility] :  [phone]. For lab appointments call [phone] or [facility].  --------------------  Please Note: If you are active on [employer], all future test results will be sent by [employer] message only, and will no longer be called to patient. You may also receive communication directly from your physician.

## 2018-10-09 NOTE — LETTER
"10/9/2018       RE: Kecia Blue  [address] [facility] [place] [address]     Dear Colleague,    Thank you for referring your patient, Kecia Blue, to the [facility] at [facility]. Please see a copy of my visit note below.    Reason for Visit  Kecia Blue is a 57-year-old female who is being seen for RECHECK (Post Lung TXP)      Lung TX HPI  Kecia Blue is a 57-year-old female with a history of dermatomyositis, seronegative rheumatoid arthritis, interstitial lung disease, and pulmonary hypertension who was admitted to the hospital with acute worsening of chronic hypoxic respiratory failure in 02/2018 and progressed to [facility] for right heart failure and subsequently underwent bilateral sequential lung transplant on 03/01/2018.  Her post-transplant course was generally uncomplicated; however, she needed pleural effusion tap on the right side later.      Interval HPI   The patient is feeling \"crappy\". Her breathing has been \"okay\", but coughing has gotten progressively worse. She is wheezing. After stent was placed on 9/10/2018, her cough was normal for about a week and then got progressively worse. No increase in shortness of breath, but the cough can cause her to breath heavier. She gets cough spasms during the day and night. The patient can wake up 3 or 4 times at night due to cough. The cough can continue until she vomits. She vomited previously eaten food twice last week. It is hard to breath through her nose.     Sunday, the patient had a pressure-like sensation about her chest. Her bowel movements are loose not formed and will have a couple of bowel movements per day. She states it is not like C. difficile, though. The patient always has chills, but no sweats or fevers. She has a poor appetite.     No heartburn, headaches, trouble passing urine, or lower extremity edema.     The patient does have right knee edema, but " believes she needs a Cortisone injection in right knee. Her left knee pain and edema improved with a Cortisone injection. She tries to do leg exercises when her knees do not hurt. The patient has not seen a provider for her knees recently and has not had fluid evaluated. CXR did reveal arthritis in knees, per patient. Heat and ice help her knee pain.      Current Outpatient Prescriptions   Medication     ACETAMINOPHEN PO     albuterol (PROAIR HFA/PROVENTIL HFA/VENTOLIN HFA) 108 (90 Base) MCG/ACT Inhaler     calcium-vitamin D (CALTRATE) 600-400 MG-UNIT per tablet     magnesium oxide (MAG-OX) 400 MG tablet     metoclopramide (REGLAN) 5 MG tablet     metoprolol tartrate (LOPRESSOR) 25 MG tablet     mycophenolate (GENERIC EQUIVALENT) 250 MG capsule     pantoprazole (PROTONIX) 40 MG EC tablet     pentoxifylline (TRENTAL) 400 MG CR tablet     predniSONE (DELTASONE) 2.5 MG tablet     predniSONE (DELTASONE) 5 MG tablet     sulfamethoxazole-trimethoprim (BACTRIM DS/SEPTRA DS) 800-160 MG per tablet     tacrolimus (GENERIC EQUIVALENT) 0.5 MG capsule     tacrolimus (GENERIC EQUIVALENT) 1 MG capsule     valGANciclovir (VALCYTE) 450 MG tablet     enoxaparin (LOVENOX) 60 MG/0.6ML injection     levalbuterol (XOPENEX HFA) 45 MCG/ACT Inhaler     multivitamin, therapeutic with minerals (THERA-VIT-M) TABS tablet     order for DME     order for DME     warfarin (COUMADIN) 1 MG tablet     No current facility-administered medications for this visit.        No Known Allergies      Past Medical History:   Diagnosis Date     Antisynthetase syndrome (H) 2014     Chronic cough      Chronic infection     recent C diff  8/18     Dehydration 8/1/2018     Dermatomyositis (H)      Dysplasia of cervix, low grade (ESTRADA 1)      ILD (interstitial lung disease) (H)      Osteopenia      PONV (postoperative nausea and vomiting)      Pulmonary hypertension (H)      Raynaud's disease      Seronegative rheumatoid arthritis (H)        Past Surgical History:    Procedure Laterality Date     BRONCHOSCOPY (RIGID OR FLEXIBLE), DIAGNOSTIC N/A 4/10/2018    Procedure: COMBINED BRONCHOSCOPY (RIGID OR FLEXIBLE), LAVAGE;;  Surgeon: Mariposa Donohue MD;  [facility]: [facility]     BRONCHOSCOPY FLEXIBLE N/A 3/13/2018    Procedure: BRONCHOSCOPY FLEXIBLE;  Flexible Bronchoscopy ;  Surgeon: Gissell Sanchez MD;  [facility]     BRONCHOSCOPY FLEXIBLE N/A 5/9/2018    Procedure: BRONCHOSCOPY FLEXIBLE;;  Surgeon: Wilber Lin MD;  [facility]     BRONCHOSCOPY FLEXIBLE AND RIGID N/A 9/10/2018    Procedure: BRONCHOSCOPY FLEXIBLE AND RIGID;  Flexible and Rigid Bronchoscopy with Balloon Dilation, tissue debulking with cryo, and Right mainstem bronchus stent placement;  Surgeon: Wilber Lin MD;  [facility]: [facility]     BRONCHOSCOPY RIGID N/A 6/6/2018    Procedure: BRONCHOSCOPY RIGID;;  Surgeon: Lopez Macias MD;  Location:  GI     BRONCHOSCOPY, DILATE BRONCHUS, STENT BRONCHUS, COMBINED N/A 6/11/2018    Procedure: COMBINED BRONCHOSCOPY, DILATE BRONCHUS, STENT BRONCHUS;  Flexible Bronchoscopy, Balloon Dilation, Bronchial Washings;  Surgeon: Wilber Lin MD;  Location:  OR     BRONCHOSCOPY, DILATE BRONCHUS, STENT BRONCHUS, COMBINED Right 7/10/2018    Procedure: COMBINED BRONCHOSCOPY, DILATE BRONCHUS, STENT BRONCHUS;  Flexible Bronchoscopy, Balloon Dilation, Bronchial Washings  ;  Surgeon: Wilber Lin MD;  Location:  OR     BRONCHOSCOPY, DILATE BRONCHUS, STENT BRONCHUS, COMBINED N/A 8/2/2018    Procedure: COMBINED BRONCHOSCOPY, DILATE BRONCHUS, STENT BRONCHUS;  Flexible Bronchoscopy, Bronchial Washings, Balloon Dilation;  Surgeon: Wilber Lin MD;  Location:  OR     BRONCHOSCOPY, DILATE BRONCHUS, STENT BRONCHUS, COMBINED N/A 8/20/2018    Procedure: COMBINED BRONCHOSCOPY, DILATE BRONCHUS, STENT BRONCHUS;  Flexible Bronchoscopy, Balloon Dilation;  Surgeon: Wilber Lin MD;  Location:  OR     ENT SURGERY       tonsillectomy as a child     INSERT EXTRACORPORAL MEMBRANE OXYGENATOR ADULT (OUTSIDE OR) N/A 2/27/2018    Procedure: INSERT EXTRACORPORAL MEMBRANE OXYGENATOR ADULT (OUTSIDE OR);  INSERT EXTRACORPORAL MEMBRANE OXYGENATOR ADULT (OUTSIDE OR) ;  Surgeon: Toby Hernandez MD;  [facility]     no prior surgery       REMOVE EXTRACORPORAL MEMBRANE OXYGENATOR ADULT N/A 3/3/2018    Procedure: REMOVE EXTRACORPORAL MEMBRANE OXYGENATOR ADULT;  Removal of Right Femoral Venous and Right Axillary Arterial Extracorporeal Membrane Oxygenator;  Surgeon: Toby Hernandez MD;  [facility]     TRANSPLANT LUNG RECIPIENT SINGLE X2 Bilateral 3/1/2018    Procedure: TRANSPLANT LUNG RECIPIENT SINGLE X2;  Median Sternotomy, Extracorporeal Membrane Oxygenator, bilateral sequential lung transplant;  Surgeon: Toby Hernandez MD;  [facility]       Social History     Social History     Marital status:      Spouse name: N/A     Number of children: N/A     Years of education: N/A     Occupational History     Not on file.     Social History Main Topics     Smoking status: Never Smoker     Smokeless tobacco: Never Used     Alcohol use No     Drug use: No     Sexual activity: Not on file     Other Topics Concern     Parent/Sibling W/ Cabg, Mi Or Angioplasty Before 65f 55m? No     Social History Narrative       Family History   Problem Relation Age of Onset     Hypertension Mother      Arthritis Mother      Pancreatic Cancer Father      Diabetes Father        Pulmonary ROS  Constitutional- Negative  Eyes- Negative  Ear, nose and throat- Negative  Cardiac- Negative  Pulm- See HPI  GI- Positive. Not formed stools, couple times a day.  Genitourinary- Negative  Musculoskeletal- Positive. Bilateral knee pain with right knee edema.  Neurology- Negative  Dermatology- Negative  Endocrine- Negative  Lymphatic- Negative  Psychiatry- Negative  A complete ROS was otherwise negative except as noted in the HPI.      BP  "128/77  Pulse 97  Temp 97.3  F (36.3  C) (Oral)  Ht 1.626 m (5' 4\")  Wt 53 kg (116 lb 12.8 oz)  LMP 06/07/2014 (Exact Date)  SpO2 100%  BMI 20.05 kg/m2  Physical Exam  GENERAL APPEARANCE: Alert, Oriented x3. Not in distress.  EYES: PERRL, EOMI  HENT: Nasal mucosa with no hyperemia and no edema, no nasal polyps.  MOUTH: Oral mucosa is moist, without any lesions, no tonsillar enlargement, no oropharyngeal exudate.  NECK: Supple, no masses, no thyromegaly.  LYMPHATICS: No significant axillary, cervical, or supraclavicular nodes.  RESP: Normal percussion, good air flow throughout Left lung. Rt. lung insp wheeze heard and poor exhalation.  CV: Normal S1, S2, regular rhythm, normal rate, no rub, no murmur,  no gallop. Trace right lower extremity edema, no left lower extremity edema.  ABDOMEN: Bowel sounds normal, soft, nontender, no HSM or masses.  MS: Extremities normal, no clubbing, no cyanosis.   SKIN: No rash on limited exam.  NEURO: Mentation intact, speech normal, normal strength and tone, normal gait, and stance.  PSYCH: Mentation appears normal, and affect normal/bright.      Results  Recent Results (from the past 168 hour(s))   INR    Collection Time: 10/04/18 12:00 AM   Result Value Ref Range    INR 3.03    CBC with platelets differential    Collection Time: 10/09/18 12:57 PM   Result Value Ref Range    WBC 11.2 (H) 4.0 - 11.0 10e9/L    RBC Count 2.62 (L) 3.8 - 5.2 10e12/L    Hemoglobin 7.9 (L) 11.7 - 15.7 g/dL    Hematocrit 27.0 (L) 35.0 - 47.0 %     (H) 78 - 100 fl    MCH 30.2 26.5 - 33.0 pg    MCHC 29.3 (L) 31.5 - 36.5 g/dL    RDW 13.9 10.0 - 15.0 %    Platelet Count 520 (H) 150 - 450 10e9/L    Diff Method Manual Differential     % Neutrophils 92.2 %    % Lymphocytes 2.6 %    % Monocytes 2.6 %    % Eosinophils 0.0 %    % Basophils 0.9 %    % Metamyelocytes 1.7 %    Absolute Neutrophil 10.3 (H) 1.6 - 8.3 10e9/L    Absolute Lymphocytes 0.3 (L) 0.8 - 5.3 10e9/L    Absolute Monocytes 0.3 0.0 - 1.3 " 10e9/L    Absolute Eosinophils 0.0 0.0 - 0.7 10e9/L    Absolute Basophils 0.1 0.0 - 0.2 10e9/L    Absolute Metamyelocytes 0.2 (H) 0 10e9/L    Anisocytosis Slight     Macrocytes Present     Platelet Estimate Confirming automated cell count    Comprehensive metabolic panel    Collection Time: 10/09/18 12:57 PM   Result Value Ref Range    Sodium 134 133 - 144 mmol/L    Potassium 4.4 3.4 - 5.3 mmol/L    Chloride 100 94 - 109 mmol/L    Carbon Dioxide 24 20 - 32 mmol/L    Anion Gap 10 3 - 14 mmol/L    Glucose 151 (H) 70 - 99 mg/dL    Urea Nitrogen 18 7 - 30 mg/dL    Creatinine 1.29 (H) 0.52 - 1.04 mg/dL    GFR Estimate 43 (L) >60 mL/min/1.7m2    GFR Estimate If Black 52 (L) >60 mL/min/1.7m2    Calcium 9.0 8.5 - 10.1 mg/dL    Bilirubin Total 0.2 0.2 - 1.3 mg/dL    Albumin 2.9 (L) 3.4 - 5.0 g/dL    Protein Total 7.4 6.8 - 8.8 g/dL    Alkaline Phosphatase 84 40 - 150 U/L    ALT 12 0 - 50 U/L    AST 12 0 - 45 U/L   INR    Collection Time: 10/09/18 12:57 PM   Result Value Ref Range    INR 2.83 (H) 0.86 - 1.14   General PFT Lab (Please always keep checked)    Collection Time: 10/09/18  1:47 PM   Result Value Ref Range    FVC-Pred 3.24 L    FVC-Pre 1.86 L    FVC-%Pred-Pre 57 %    FEV1-Pre 0.82 L    FEV1-%Pred-Pre 31 %    FEV1FVC-Pred 79 %    FEV1FVC-Pre 44 %    FEFMax-Pred 6.43 L/sec    FEFMax-Pre 2.33 L/sec    FEFMax-%Pred-Pre 36 %    FEF2575-Pred 2.43 L/sec    FEF2575-Pre 0.38 L/sec    WMV1653-%Pred-Pre 15 %    ExpTime-Pre 6.96 sec    FIFMax-Pre 1.74 L/sec    FEV1FEV6-Pred 81 %    FEV1FEV6-Pre 39 %        Results as noted above.    PFT Interpretation:  Severe obstructive ventilatory defect along with severe restriction.  FEV1 has declined by 450ml.  Valid Maneuver    CXR (10/9/2018), personally reviewed by me: The Lung fields are clear. No effusion. Cardiac silhouette is WNL.      Assessment and Plan: Kecia Blue is a 57-year-old female with a history of dermatomyositis, seronegative rheumatoid arthritis, interstitial lung  disease, and pulmonary hypertension who was admitted to the hospital with acute worsening of chronic hypoxic respiratory failure in 02/2018 and progressed [facility] for right heart failure and subsequently underwent bilateral sequential lung transplant on 03/01/2018. Her post-transplant course was generally uncomplicated; however, she needed pleural effusion tap on the right side later.     # Bilateral Lung Transplant: She severe restriction with low PFT's. Etiology unclear - ?chest wall restriction. Possible diaphram weakness/paralysis. Deconditioning might be playing a role.  Current IS regimen:   - Tacrolimus with a goal of 10-15 nanograms/mL, mycophenolate 1500 mg twice a day and prednisone taper per protocol.    DSA positive since 5/8/18 - it is unchanged on 5/16/18. It is DQB7.  9/4/2018: FEV1 is stable. CXR is stable. Sx are slightly worse. Lung exam consistent with worsening narrowing on Rt main stem.    - No changes in IS regimen. Has had TBBx only once since ltx and it was on 4/2018.  10/9/2018: FEV1 today (0.82) worse from previous evaluation (1.27). She has had a progressively worsening cough a week after stent placed on 9/10/2018. Based on CXR the stent has moved and is partially blocking left mainstem too. It is possible that the stent is irritating the trachea and causing her cough. She is scheduled to have the stent removed on Thursday at 13:30.   - She will come to OR desk at 11:30 AM.    # Right Main Bronchial Stenosis S/P Dilatation: Last Dilation on 8/20, next scheduled in one week. Still a good candidate for the dilation and possible stenting.  10/9/2018: Last stent placed on 9/10/18. It has moved, plan as above.  - Continue pentoxyfilline.    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 On Valcyte 450mg/day.  Last CMV was better.  H/o C. diff colitis: Finished a 10day course of Oral Vancomycin.  High risk donor: Will need labs in 12 months.    # Hypomagnesemia: The patient remains on magnesium replacement. Her magnesium level today is acceptable.    # Provoked Left Upper Extremity Clot and Right IJ Clot (3/7/18): The plan was 3 months. Repeat US on 4/16 with decreased subclavian thrombus and resolved axillary thrombus, but new (vs previously obscured) right internal jugular clot. On Warfarin, goal INR 2 - 3. Plan to continue anticoag till 10/22/18.  10/9/2018: Will repeat US to evaluate Rt. internal jugular thrombus, if the clot is stable or resolved then will stop Warfarin. If the clot is worse will switch to DOAC considering her renal function.    For the procedure tomorrow:  Take Vit K 10mg now, INR tomorrow and, if not within appropriate level, will repeat Vit K and check INR on Thursday.    # Dermatomyositis with Raynaud's Phenomenon: Followed by rheumatology.  - Myositis panel is positive.    # Hoarse Voice: She does complain of some hoarseness and is scheduled to see ENT, has vocal cord polyp seen at there last bronch.    # CKD, Stage III: Cr has increased again.   - Will follow for now.    # Anemia: with hgb of 7.6 down from baseline 8-9s. No s/s of bleeding. Received 1 U PRBC during admission, suspect possible occult bleeding from C diff. Hgb is stable at 7.5. Iron panel, folate/B12 WNL.  - Monitor    # Nausea and Early Satiety: Worsened with the Bactrim. Wants to eat, but gets full quickly. No epigastric pain, but notes increased cough and rhinorrhea. CXR demonstrates ongoing dilated loops of bowel, stable from prior. CT abd pelvis on 8/20/18 mildly dilated loops of SB.  9/4/2018: Wean reglan to BID and then to off if possible. Can use it on a as needed basis. She is unsure if reglan helped or not.   - Cont pantoprazole to 40 mg BID  - Gastroenterology referral pending on 12/2018.  10/9/2018: She continues to  have a poor appetite. On Reglan BID, the nausea has not been the primary limiting factor.   - Prescribed Marinol 5 mg twice a day. Advised to watch out for depression.    # HTN: Well controlled on Metoprolol 25mg BID.    # Bilateral Knee Pain: She has bilateral knee pain, which is due to arthritis, per patient. She received a Cortisone injection in left knee recently and this helped her left knee pain and edema. Right knee is painful and edematous today (10/9/2018).  - If this continues to be a problem might benefit from Orthopedic input.      RTC in 3 to 4 weeks with labs including spirometry and xray due to drop in FEV1.      Scribe Disclosure:   I, Jacinto Duong, am serving as a scribe; to document services personally performed by Tarik Christensen MD based on data collection and the provider's statements to me.     Provider Disclosure:  I agree with above History, Review of Systems, Physical exam and Plan. I have reviewed the content of the documentation and have edited it as needed. I have personally performed the services documented here and the documentation accurately represents those services and the decisions I have made.      Electronically signed by:  Tarik Christensen MD.    Again, thank you for allowing me to participate in the care of your patient.      Sincerely,    Tarik Christensen MD

## 2018-10-09 NOTE — NURSING NOTE
Transplant Coordinator Note    Reason for visit: Post lung transplant follow up visit   Coordinator: Present   Caregiver:  spouse    Health concerns addressed today:  1. Stent moved, OR bronch on Thursday  2.Ultrasound to assess DVT, if there's no clot, will stop coumadin for good. Coumadin currently on hold for the bronch on Thursday. She'll take 10 mg of vitamin K today. Check INR tomorrow   3. Poor appetite   *will try marinol   *CMV pending  4. B/l knee pain     Labs, CXR, PFTs reviewed with patient  Medication record reviewed and reconciled  Questions and concerns addressed    Patient Instructions  1. OR bronch on Thursday at 1:30 PM to remove and/or replace stent   2. Ultrasound today or tomorrow  3. Hold coumadin   4. Take 10 mg of vitamin k today. Check INR tomorrow. If you don't have 10 mg, call the office [phone]   5. Start Marinol for you appetite     Next transplant clinic appointment: 10/24 with CXR, labs and PFTs  Next lab draw: tomorrow . Maybe Thursday. Then weekly labs      AVS printed at time of check out

## 2018-10-09 NOTE — LETTER
"10/9/2018      RE: Kecia Blue  [address] [place]  [facility] [address]       Reason for Visit  Kecia Blue is a 57-year-old female who is being seen for RECHECK (Post Lung TXP)      Lung TX HPI  Kecia Blue is a 57-year-old female with a history of dermatomyositis, seronegative rheumatoid arthritis, interstitial lung disease, and pulmonary hypertension who was admitted to the hospital with acute worsening of chronic hypoxic respiratory failure in 02/2018 and progressed to [facility] for right heart failure and subsequently underwent bilateral sequential lung transplant on 03/01/2018.  Her post-transplant course was generally uncomplicated; however, she needed pleural effusion tap on the right side later.      Interval HPI   The patient is feeling \"crappy\". Her breathing has been \"okay\", but coughing has gotten progressively worse. She is wheezing. After stent was placed on 9/10/2018, her cough was normal for about a week and then got progressively worse. No increase in shortness of breath, but the cough can cause her to breath heavier. She gets cough spasms during the day and night. The patient can wake up 3 or 4 times at night due to cough. The cough can continue until she vomits. She vomited previously eaten food twice last week. It is hard to breath through her nose.     Sunday, the patient had a pressure-like sensation about her chest. Her bowel movements are loose not formed and will have a couple of bowel movements per day. She states it is not like C. difficile, though. The patient always has chills, but no sweats or fevers. She has a poor appetite.     No heartburn, headaches, trouble passing urine, or lower extremity edema.     The patient does have right knee edema, but believes she needs a Cortisone injection in right knee. Her left knee pain and edema improved with a Cortisone injection. She tries to do leg exercises when her knees do not hurt. The patient has not seen a provider " for her knees recently and has not had fluid evaluated. CXR did reveal arthritis in knees, per patient. Heat and ice help her knee pain.      Current Outpatient Prescriptions   Medication     ACETAMINOPHEN PO     albuterol (PROAIR HFA/PROVENTIL HFA/VENTOLIN HFA) 108 (90 Base) MCG/ACT Inhaler     calcium-vitamin D (CALTRATE) 600-400 MG-UNIT per tablet     magnesium oxide (MAG-OX) 400 MG tablet     metoclopramide (REGLAN) 5 MG tablet     metoprolol tartrate (LOPRESSOR) 25 MG tablet     mycophenolate (GENERIC EQUIVALENT) 250 MG capsule     pantoprazole (PROTONIX) 40 MG EC tablet     pentoxifylline (TRENTAL) 400 MG CR tablet     predniSONE (DELTASONE) 2.5 MG tablet     predniSONE (DELTASONE) 5 MG tablet     sulfamethoxazole-trimethoprim (BACTRIM DS/SEPTRA DS) 800-160 MG per tablet     tacrolimus (GENERIC EQUIVALENT) 0.5 MG capsule     tacrolimus (GENERIC EQUIVALENT) 1 MG capsule     valGANciclovir (VALCYTE) 450 MG tablet     enoxaparin (LOVENOX) 60 MG/0.6ML injection     levalbuterol (XOPENEX HFA) 45 MCG/ACT Inhaler     multivitamin, therapeutic with minerals (THERA-VIT-M) TABS tablet     order for DME     order for DME     warfarin (COUMADIN) 1 MG tablet     No current facility-administered medications for this visit.        No Known Allergies      Past Medical History:   Diagnosis Date     Antisynthetase syndrome (H) 2014     Chronic cough      Chronic infection     recent C diff  8/18     Dehydration 8/1/2018     Dermatomyositis (H)      Dysplasia of cervix, low grade (ESTRADA 1)      ILD (interstitial lung disease) (H)      Osteopenia      PONV (postoperative nausea and vomiting)      Pulmonary hypertension (H)      Raynaud's disease      Seronegative rheumatoid arthritis (H)        Past Surgical History:   Procedure Laterality Date     BRONCHOSCOPY (RIGID OR FLEXIBLE), DIAGNOSTIC N/A 4/10/2018    Procedure: COMBINED BRONCHOSCOPY (RIGID OR FLEXIBLE), LAVAGE;;  Surgeon: Mariposa Donohue MD;  Location:  GI      BRONCHOSCOPY FLEXIBLE N/A 3/13/2018    Procedure: BRONCHOSCOPY FLEXIBLE;  Flexible Bronchoscopy ;  Surgeon: Gissell Sanchez MD;  Location: [facility]     BRONCHOSCOPY FLEXIBLE N/A 5/9/2018    Procedure: BRONCHOSCOPY FLEXIBLE;;  Surgeon: Wilber Lin MD;  Location: [facility]     BRONCHOSCOPY FLEXIBLE AND RIGID N/A 9/10/2018    Procedure: BRONCHOSCOPY FLEXIBLE AND RIGID;  Flexible and Rigid Bronchoscopy with Balloon Dilation, tissue debulking with cryo, and Right mainstem bronchus stent placement;  Surgeon: Wilber Lin MD;  Location: [facility]     BRONCHOSCOPY RIGID N/A 6/6/2018    Procedure: BRONCHOSCOPY RIGID;;  Surgeon: Lopez Macias MD;  Location: [facility]     BRONCHOSCOPY, DILATE BRONCHUS, STENT BRONCHUS, COMBINED N/A 6/11/2018    Procedure: COMBINED BRONCHOSCOPY, DILATE BRONCHUS, STENT BRONCHUS;  Flexible Bronchoscopy, Balloon Dilation, Bronchial Washings;  Surgeon: Wilber Lin MD;  Location: [facility]     BRONCHOSCOPY, DILATE BRONCHUS, STENT BRONCHUS, COMBINED Right 7/10/2018    Procedure: COMBINED BRONCHOSCOPY, DILATE BRONCHUS, STENT BRONCHUS;  Flexible Bronchoscopy, Balloon Dilation, Bronchial Washings  ;  Surgeon: Wilber Lin MD;  Location: [facility]     BRONCHOSCOPY, DILATE BRONCHUS, STENT BRONCHUS, COMBINED N/A 8/2/2018    Procedure: COMBINED BRONCHOSCOPY, DILATE BRONCHUS, STENT BRONCHUS;  Flexible Bronchoscopy, Bronchial Washings, Balloon Dilation;  Surgeon: Wilber Lin MD;  Location: [facility]     BRONCHOSCOPY, DILATE BRONCHUS, STENT BRONCHUS, COMBINED N/A 8/20/2018    Procedure: COMBINED BRONCHOSCOPY, DILATE BRONCHUS, STENT BRONCHUS;  Flexible Bronchoscopy, Balloon Dilation;  Surgeon: Wilber Lin MD;  Location: [facility]     ENT SURGERY      tonsillectomy as a child     INSERT EXTRACORPORAL MEMBRANE OXYGENATOR ADULT (OUTSIDE OR) N/A 2/27/2018    Procedure: INSERT EXTRACORPORAL MEMBRANE OXYGENATOR ADULT (OUTSIDE OR);  INSERT EXTRACORPORAL MEMBRANE  "OXYGENATOR ADULT (OUTSIDE OR) ;  Surgeon: Toby Hernandez MD;  Location: [facility]     no prior surgery       REMOVE EXTRACORPORAL MEMBRANE OXYGENATOR ADULT N/A 3/3/2018    Procedure: REMOVE EXTRACORPORAL MEMBRANE OXYGENATOR ADULT;  Removal of Right Femoral Venous and Right Axillary Arterial Extracorporeal Membrane Oxygenator;  Surgeon: Toby Hernandez MD;  Location: [facility]     TRANSPLANT LUNG RECIPIENT SINGLE X2 Bilateral 3/1/2018    Procedure: TRANSPLANT LUNG RECIPIENT SINGLE X2;  Median Sternotomy, Extracorporeal Membrane Oxygenator, bilateral sequential lung transplant;  Surgeon: Toby Hernandez MD;  Location: [facility]       Social History     Social History     Marital status:      Spouse name: N/A     Number of children: N/A     Years of education: N/A     Occupational History     Not on file.     Social History Main Topics     Smoking status: Never Smoker     Smokeless tobacco: Never Used     Alcohol use No     Drug use: No     Sexual activity: Not on file     Other Topics Concern     Parent/Sibling W/ Cabg, Mi Or Angioplasty Before 65f 55m? No     Social History Narrative       Family History   Problem Relation Age of Onset     Hypertension Mother      Arthritis Mother      Pancreatic Cancer Father      Diabetes Father        Pulmonary ROS  Constitutional- Negative  Eyes- Negative  Ear, nose and throat- Negative  Cardiac- Negative  Pulm- See HPI  GI- Positive. Not formed stools, couple times a day.  Genitourinary- Negative  Musculoskeletal- Positive. Bilateral knee pain with right knee edema.  Neurology- Negative  Dermatology- Negative  Endocrine- Negative  Lymphatic- Negative  Psychiatry- Negative  A complete ROS was otherwise negative except as noted in the HPI.      /77  Pulse 97  Temp 97.3  F (36.3  C) (Oral)  Ht 1.626 m (5' 4\")  Wt 53 kg (116 lb 12.8 oz)  LMP 06/07/2014 (Exact Date)  SpO2 100%  BMI 20.05 kg/m2  Physical Exam  GENERAL APPEARANCE: Alert, " Oriented x3. Not in distress.  EYES: PERRL, EOMI  HENT: Nasal mucosa with no hyperemia and no edema, no nasal polyps.  MOUTH: Oral mucosa is moist, without any lesions, no tonsillar enlargement, no oropharyngeal exudate.  NECK: Supple, no masses, no thyromegaly.  LYMPHATICS: No significant axillary, cervical, or supraclavicular nodes.  RESP: Normal percussion, good air flow throughout Left lung. Rt. lung insp wheeze heard and poor exhalation.  CV: Normal S1, S2, regular rhythm, normal rate, no rub, no murmur,  no gallop. Trace right lower extremity edema, no left lower extremity edema.  ABDOMEN: Bowel sounds normal, soft, nontender, no HSM or masses.  MS: Extremities normal, no clubbing, no cyanosis.   SKIN: No rash on limited exam.  NEURO: Mentation intact, speech normal, normal strength and tone, normal gait, and stance.  PSYCH: Mentation appears normal, and affect normal/bright.      Results  Recent Results (from the past 168 hour(s))   INR    Collection Time: 10/04/18 12:00 AM   Result Value Ref Range    INR 3.03    CBC with platelets differential    Collection Time: 10/09/18 12:57 PM   Result Value Ref Range    WBC 11.2 (H) 4.0 - 11.0 10e9/L    RBC Count 2.62 (L) 3.8 - 5.2 10e12/L    Hemoglobin 7.9 (L) 11.7 - 15.7 g/dL    Hematocrit 27.0 (L) 35.0 - 47.0 %     (H) 78 - 100 fl    MCH 30.2 26.5 - 33.0 pg    MCHC 29.3 (L) 31.5 - 36.5 g/dL    RDW 13.9 10.0 - 15.0 %    Platelet Count 520 (H) 150 - 450 10e9/L    Diff Method Manual Differential     % Neutrophils 92.2 %    % Lymphocytes 2.6 %    % Monocytes 2.6 %    % Eosinophils 0.0 %    % Basophils 0.9 %    % Metamyelocytes 1.7 %    Absolute Neutrophil 10.3 (H) 1.6 - 8.3 10e9/L    Absolute Lymphocytes 0.3 (L) 0.8 - 5.3 10e9/L    Absolute Monocytes 0.3 0.0 - 1.3 10e9/L    Absolute Eosinophils 0.0 0.0 - 0.7 10e9/L    Absolute Basophils 0.1 0.0 - 0.2 10e9/L    Absolute Metamyelocytes 0.2 (H) 0 10e9/L    Anisocytosis Slight     Macrocytes Present     Platelet  Estimate Confirming automated cell count    Comprehensive metabolic panel    Collection Time: 10/09/18 12:57 PM   Result Value Ref Range    Sodium 134 133 - 144 mmol/L    Potassium 4.4 3.4 - 5.3 mmol/L    Chloride 100 94 - 109 mmol/L    Carbon Dioxide 24 20 - 32 mmol/L    Anion Gap 10 3 - 14 mmol/L    Glucose 151 (H) 70 - 99 mg/dL    Urea Nitrogen 18 7 - 30 mg/dL    Creatinine 1.29 (H) 0.52 - 1.04 mg/dL    GFR Estimate 43 (L) >60 mL/min/1.7m2    GFR Estimate If Black 52 (L) >60 mL/min/1.7m2    Calcium 9.0 8.5 - 10.1 mg/dL    Bilirubin Total 0.2 0.2 - 1.3 mg/dL    Albumin 2.9 (L) 3.4 - 5.0 g/dL    Protein Total 7.4 6.8 - 8.8 g/dL    Alkaline Phosphatase 84 40 - 150 U/L    ALT 12 0 - 50 U/L    AST 12 0 - 45 U/L   INR    Collection Time: 10/09/18 12:57 PM   Result Value Ref Range    INR 2.83 (H) 0.86 - 1.14   General PFT Lab (Please always keep checked)    Collection Time: 10/09/18  1:47 PM   Result Value Ref Range    FVC-Pred 3.24 L    FVC-Pre 1.86 L    FVC-%Pred-Pre 57 %    FEV1-Pre 0.82 L    FEV1-%Pred-Pre 31 %    FEV1FVC-Pred 79 %    FEV1FVC-Pre 44 %    FEFMax-Pred 6.43 L/sec    FEFMax-Pre 2.33 L/sec    FEFMax-%Pred-Pre 36 %    FEF2575-Pred 2.43 L/sec    FEF2575-Pre 0.38 L/sec    GUQ4752-%Pred-Pre 15 %    ExpTime-Pre 6.96 sec    FIFMax-Pre 1.74 L/sec    FEV1FEV6-Pred 81 %    FEV1FEV6-Pre 39 %        Results as noted above.    PFT Interpretation:  Severe obstructive ventilatory defect along with severe restriction.  FEV1 has declined by 450ml.  Valid Maneuver    CXR (10/9/2018), personally reviewed by me: The Lung fields are clear. No effusion. Cardiac silhouette is WNL.      Assessment and Plan: Kecia Blue is a 57-year-old female with a history of dermatomyositis, seronegative rheumatoid arthritis, interstitial lung disease, and pulmonary hypertension who was admitted to the hospital with acute worsening of chronic hypoxic respiratory failure in 02/2018 and progressed [facility] for right heart failure and  subsequently underwent bilateral sequential lung transplant on 03/01/2018. Her post-transplant course was generally uncomplicated; however, she needed pleural effusion tap on the right side later.     # Bilateral Lung Transplant: She severe restriction with low PFT's. Etiology unclear - ?chest wall restriction. Possible diaphram weakness/paralysis. Deconditioning might be playing a role.  Current IS regimen:   - Tacrolimus with a goal of 10-15 nanograms/mL, mycophenolate 1500 mg twice a day and prednisone taper per protocol.    DSA positive since 5/8/18 - it is unchanged on 5/16/18. It is DQB7.  9/4/2018: FEV1 is stable. CXR is stable. Sx are slightly worse. Lung exam consistent with worsening narrowing on Rt main stem.    - No changes in IS regimen. Has had TBBx only once since ltx and it was on 4/2018.  10/9/2018: FEV1 today (0.82) worse from previous evaluation (1.27). She has had a progressively worsening cough a week after stent placed on 9/10/2018. Based on CXR the stent has moved and is partially blocking left mainstem too. It is possible that the stent is irritating the trachea and causing her cough. She is scheduled to have the stent removed on Thursday at 13:30.   - She will come to OR desk at 11:30 AM.    # Right Main Bronchial Stenosis S/P Dilatation: Last Dilation on 8/20, next scheduled in one week. Still a good candidate for the dilation and possible stenting.  10/9/2018: Last stent placed on 9/10/18. It has moved, plan as above.  - Continue pentoxyfilline.    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 On Valcyte 450mg/day. Last CMV was better.  H/o C. diff colitis: Finished a 10day course of Oral Vancomycin.  High risk donor: Will need labs in 12 months.    # Hypomagnesemia: The patient remains on magnesium  replacement. Her magnesium level today is acceptable.    # Provoked Left Upper Extremity Clot and Right IJ Clot (3/7/18): The plan was 3 months. Repeat US on 4/16 with decreased subclavian thrombus and resolved axillary thrombus, but new (vs previously obscured) right internal jugular clot. On Warfarin, goal INR 2 - 3. Plan to continue anticoag till 10/22/18.  10/9/2018: Will repeat US to evaluate Rt. internal jugular thrombus, if the clot is stable or resolved then will stop Warfarin. If the clot is worse will switch to DOAC considering her renal function.    For the procedure tomorrow:  Take Vit K 10mg now, INR tomorrow and, if not within appropriate level, will repeat Vit K and check INR on Thursday.    # Dermatomyositis with Raynaud's Phenomenon: Followed by rheumatology.  - Myositis panel is positive.    # Hoarse Voice: She does complain of some hoarseness and is scheduled to see ENT, has vocal cord polyp seen at there last bronch.    # CKD, Stage III: Cr has increased again.   - Will follow for now.    # Anemia: with hgb of 7.6 down from baseline 8-9s. No s/s of bleeding. Received 1 U PRBC during admission, suspect possible occult bleeding from C diff. Hgb is stable at 7.5. Iron panel, folate/B12 WNL.  - Monitor    # Nausea and Early Satiety: Worsened with the Bactrim. Wants to eat, but gets full quickly. No epigastric pain, but notes increased cough and rhinorrhea. CXR demonstrates ongoing dilated loops of bowel, stable from prior. CT abd pelvis on 8/20/18 mildly dilated loops of SB.  9/4/2018: Wean reglan to BID and then to off if possible. Can use it on a as needed basis. She is unsure if reglan helped or not.   - Cont pantoprazole to 40 mg BID  - Gastroenterology referral pending on 12/2018.  10/9/2018: She continues to have a poor appetite. On Reglan BID, the nausea has not been the primary limiting factor.   - Prescribed Marinol 5 mg twice a day. Advised to watch out for depression.    # HTN: Well  controlled on Metoprolol 25mg BID.    # Bilateral Knee Pain: She has bilateral knee pain, which is due to arthritis, per patient. She received a Cortisone injection in left knee recently and this helped her left knee pain and edema. Right knee is painful and edematous today (10/9/2018).  - If this continues to be a problem might benefit from Orthopedic input.      RTC in 3 to 4 weeks with labs including spirometry and xray due to drop in FEV1.      Scribe Disclosure:   I, Jacinto Duong, am serving as a scribe; to document services personally performed by Tarik Christensen MD based on data collection and the provider's statements to me.     Provider Disclosure:  I agree with above History, Review of Systems, Physical exam and Plan. I have reviewed the content of the documentation and have edited it as needed. I have personally performed the services documented here and the documentation accurately represents those services and the decisions I have made.      Electronically signed by:  Tarik Christensen MD.

## 2018-10-09 NOTE — MR AVS SNAPSHOT
Kecia Blue   10/9/2018   Anticoagulation Therapy Visit    Description:  57 year old female   Provider:  Joshua Davis, RN   Department:  [facility]           INR as of 10/9/2018     Today's INR 2.83      Anticoagulation Summary as of 10/9/2018     INR goal 2.0-3.0   Today's INR 2.83   Full warfarin instructions 10/9: 4 mg; 10/10: 4 mg; 10/11: 4 mg; 10/12: 6 mg; 10/13: 4 mg; 10/14: 4 mg; 10/15: 6 mg; Otherwise No maintenance plan   Next INR check 10/16/2018    Indications   Lung transplant recipient (H) [Z94.2]  Deep vein thrombosis (DVT) (H) [I82.409] [I82.409]         October 2018 Details    Sun Mon Tue Wed Thu Fri Sat      1               2               3               4               5               6                 7               8               9      4 mg   See details      10      4 mg         11      4 mg         12      6 mg         13      4 mg           14      4 mg         15      6 mg         16            17               18               19               20                 21               22               23               24               25               26               27                 28               29               30               31                   Date Details   10/09 This INR check       Date of next INR:  10/16/2018         How to take your warfarin dose     To take:  4 mg Take 4 of the 1 mg tablets.    To take:  6 mg Take 6 of the 1 mg tablets.

## 2018-10-09 NOTE — PROGRESS NOTES
ANTICOAGULATION FOLLOW-UP CLINIC VISIT    Patient Name:  Kecia Blue  Date:  10/9/2018  Contact Type:  Telephone    SUBJECTIVE:     Patient Findings     Positives No Problem Findings    Comments Spoke to patient's .  Did not adjust Coumadin dose.  Will check in one week.  Kecia has appointments today at the .  Will update the [facility] if there are changes to medication.           OBJECTIVE    INR   Date Value Ref Range Status   10/09/2018 2.83 (H) 0.86 - 1.14 Final       ASSESSMENT / PLAN  INR assessment THER    Recheck INR In: 1 WEEK    INR Location Clinic      Anticoagulation Summary as of 10/9/2018     INR goal 2.0-3.0   Today's INR 2.83   Warfarin maintenance plan No maintenance plan   Full warfarin instructions 10/9: 4 mg; 10/10: 4 mg; 10/11: 4 mg; 10/12: 6 mg; 10/13: 4 mg; 10/14: 4 mg; 10/15: 6 mg; Otherwise No maintenance plan   Next INR check 10/16/2018   Priority INR   Target end date     Indications   Lung transplant recipient (H) [Z94.2]  Deep vein thrombosis (DVT) (H) [I82.409] [I82.409]         Anticoagulation Episode Summary     INR check location Clinic Lab    Preferred lab     Send INR reminders to [facility]    Comments Extended duration orders until October per Magaly Castro RN- Provoked [id number] OK and  Verbal OK to speak with spouse and leave msg @ [phone].  labs @ [facility] starting ~ 5/25/18.  fax: [phone]. ph:[phone] BRONCH PLAN NOTE 6/6/18      Anticoagulation Care Providers     Provider Role Specialty Phone number    Ame Chow PA-C Responsible Pulmonary [phone]            See the Encounter Report to view Anticoagulation Flowsheet and Dosing Calendar (Go to Encounters tab in chart review, and find the Anticoagulation Therapy Visit)    Spoke with patient's spouse. Gave him the lab results and new warfarin recommendation.  No changes in health, medication, or diet. No missed doses, no falls. No signs or symptoms of bleed or  clotting.    Joshua Davis, RN

## 2018-10-10 ENCOUNTER — ANTICOAGULATION THERAPY VISIT (OUTPATIENT)
Dept: ANTICOAGULATION | Facility: CLINIC | Age: 58
End: 2018-10-10

## 2018-10-10 DIAGNOSIS — Z94.2 LUNG TRANSPLANT RECIPIENT (H): ICD-10-CM

## 2018-10-10 DIAGNOSIS — Z94.2 LUNG REPLACED BY TRANSPLANT (H): ICD-10-CM

## 2018-10-10 DIAGNOSIS — I82.409 DEEP VEIN THROMBOSIS (DVT) (H): ICD-10-CM

## 2018-10-10 LAB
CMV DNA SPEC NAA+PROBE-ACNC: <137 [IU]/ML
CMV DNA SPEC NAA+PROBE-LOG#: <2.1 {LOG_IU}/ML
INR PPP: 1.32
PRA DONOR SPECIFIC ABY: NORMAL
SPECIMEN SOURCE: ABNORMAL

## 2018-10-10 PROCEDURE — 80197 ASSAY OF TACROLIMUS: CPT | Performed by: INTERNAL MEDICINE

## 2018-10-10 NOTE — PROGRESS NOTES
Kecia is scheduled for a bronch tomorrow.  She was seen in clinic and Dr. Christensen ordered this.  She was given vitamin K 10mg po X 1.  INR today was 1.32. Kecia will ask MD doing procedure when she is able to restart warfarin and Lovenox.  Patient has lost some weight and currently 52 kilos.  Cr Cl =40.5.  Spoke with Nasreen from the transplant office and she will let Dr. Christensen plan.    New Lovenox dose 50mg Q 12 hours.    Kecia is aware of plan.

## 2018-10-10 NOTE — MR AVS SNAPSHOT
Kecia Blue   10/10/2018   Anticoagulation Therapy Visit    Description:  57 year old female   Provider:  Katie Bush RN   Department:  [facility]           INR as of 10/10/2018     Today's INR 1.32!      Anticoagulation Summary as of 10/10/2018     INR goal 2.0-3.0   Today's INR 1.32!   Full warfarin instructions 10/10: Hold; 10/11: 4 mg; 10/12: 6 mg; 10/13: 4 mg; 10/14: 4 mg; 10/15: 6 mg; Otherwise No maintenance plan   Next INR check 10/11/2018    Indications   Lung transplant recipient (H) [Z94.2]  Deep vein thrombosis (DVT) (H) [I82.409] [I82.409]         October 2018 Details    Sun Mon Tue Wed Thu Fri Sat      1               2               3               4               5               6                 7               8               9               10      Hold   See details      11            12               13                 14               15               16               17               18               19               20                 21               22               23               24               25               26               27                 28               29               30               31                   Date Details   10/10 This INR check       Date of next INR:  10/11/2018         How to take your warfarin dose     To take:  4 mg Take 4 of the 1 mg tablets.    Hold Do not take your warfarin dose. See the Details table to the right for additional instructions.

## 2018-10-11 ENCOUNTER — ANESTHESIA EVENT (OUTPATIENT)
Dept: SURGERY | Facility: CLINIC | Age: 58
End: 2018-10-11
Payer: COMMERCIAL

## 2018-10-11 ENCOUNTER — HOSPITAL ENCOUNTER (OUTPATIENT)
Facility: CLINIC | Age: 58
Discharge: HOME | End: 2018-10-11
Attending: INTERNAL MEDICINE | Admitting: INTERNAL MEDICINE
Payer: COMMERCIAL

## 2018-10-11 ENCOUNTER — ANTICOAGULATION THERAPY VISIT (OUTPATIENT)
Dept: ANTICOAGULATION | Facility: CLINIC | Age: 58
End: 2018-10-11

## 2018-10-11 ENCOUNTER — ANESTHESIA (OUTPATIENT)
Dept: SURGERY | Facility: CLINIC | Age: 58
End: 2018-10-11
Payer: COMMERCIAL

## 2018-10-11 ENCOUNTER — TELEPHONE (OUTPATIENT)
Dept: TRANSPLANT | Facility: CLINIC | Age: 58
End: 2018-10-11

## 2018-10-11 VITALS
HEIGHT: 64 IN | RESPIRATION RATE: 18 BRPM | WEIGHT: 118.39 LBS | TEMPERATURE: 98.6 F | OXYGEN SATURATION: 93 % | BODY MASS INDEX: 20.21 KG/M2 | SYSTOLIC BLOOD PRESSURE: 114 MMHG | DIASTOLIC BLOOD PRESSURE: 73 MMHG

## 2018-10-11 DIAGNOSIS — Z94.2 LUNG TRANSPLANT RECIPIENT (H): ICD-10-CM

## 2018-10-11 DIAGNOSIS — I82.409 DEEP VEIN THROMBOSIS (DVT) (H): ICD-10-CM

## 2018-10-11 LAB
DONOR IDENTIFICATION: NORMAL
DSA COMMENTS: NORMAL
DSA PRESENT: NO
DSA TEST METHOD: NORMAL
GLUCOSE BLDC GLUCOMTR-MCNC: 108 MG/DL (ref 70–99)
ORGAN: NORMAL
SA1 CELL: NORMAL
SA1 COMMENTS: NORMAL
SA1 HI RISK ABY: NORMAL
SA1 MOD RISK ABY: NORMAL
SA1 TEST METHOD: NORMAL
SA2 CELL: NORMAL
SA2 COMMENTS: NORMAL
SA2 HI RISK ABY UA: NORMAL
SA2 MOD RISK ABY: NORMAL
SA2 TEST METHOD: NORMAL
TACROLIMUS BLD-MCNC: 17.2 UG/L (ref 5–15)
TME LAST DOSE: ABNORMAL H

## 2018-10-11 PROCEDURE — 40000170 ZZH STATISTIC PRE-PROCEDURE ASSESSMENT II: Performed by: INTERNAL MEDICINE

## 2018-10-11 PROCEDURE — C1726 CATH, BAL DIL, NON-VASCULAR: HCPCS | Performed by: INTERNAL MEDICINE

## 2018-10-11 PROCEDURE — 71000014 ZZH RECOVERY PHASE 1 LEVEL 2 FIRST HR: Performed by: INTERNAL MEDICINE

## 2018-10-11 PROCEDURE — 71000015 ZZH RECOVERY PHASE 1 LEVEL 2 EA ADDTL HR: Performed by: INTERNAL MEDICINE

## 2018-10-11 PROCEDURE — 36000059 ZZH SURGERY LEVEL 3 EA 15 ADDTL MIN UMMC: Performed by: INTERNAL MEDICINE

## 2018-10-11 PROCEDURE — 82962 GLUCOSE BLOOD TEST: CPT

## 2018-10-11 PROCEDURE — 27210794 ZZH OR GENERAL SUPPLY STERILE: Performed by: INTERNAL MEDICINE

## 2018-10-11 PROCEDURE — 25000128 H RX IP 250 OP 636: Performed by: NURSE ANESTHETIST, CERTIFIED REGISTERED

## 2018-10-11 PROCEDURE — 36000061 ZZH SURGERY LEVEL 3 W FLUORO 1ST 30 MIN - UMMC: Performed by: INTERNAL MEDICINE

## 2018-10-11 PROCEDURE — 37000009 ZZH ANESTHESIA TECHNICAL FEE, EACH ADDTL 15 MIN: Performed by: INTERNAL MEDICINE

## 2018-10-11 PROCEDURE — 71000027 ZZH RECOVERY PHASE 2 EACH 15 MINS: Performed by: INTERNAL MEDICINE

## 2018-10-11 PROCEDURE — 25000125 ZZHC RX 250: Performed by: NURSE ANESTHETIST, CERTIFIED REGISTERED

## 2018-10-11 PROCEDURE — 37000008 ZZH ANESTHESIA TECHNICAL FEE, 1ST 30 MIN: Performed by: INTERNAL MEDICINE

## 2018-10-11 RX ORDER — SODIUM CHLORIDE, SODIUM LACTATE, POTASSIUM CHLORIDE, CALCIUM CHLORIDE 600; 310; 30; 20 MG/100ML; MG/100ML; MG/100ML; MG/100ML
INJECTION, SOLUTION INTRAVENOUS CONTINUOUS PRN
Status: DISCONTINUED | OUTPATIENT
Start: 2018-10-11 | End: 2018-10-11

## 2018-10-11 RX ORDER — LABETALOL HYDROCHLORIDE 5 MG/ML
5 INJECTION, SOLUTION INTRAVENOUS EVERY 10 MIN PRN
Status: DISCONTINUED | OUTPATIENT
Start: 2018-10-11 | End: 2018-10-11 | Stop reason: HOSPADM

## 2018-10-11 RX ORDER — ONDANSETRON 2 MG/ML
INJECTION INTRAMUSCULAR; INTRAVENOUS PRN
Status: DISCONTINUED | OUTPATIENT
Start: 2018-10-11 | End: 2018-10-11

## 2018-10-11 RX ORDER — FENTANYL CITRATE 50 UG/ML
INJECTION, SOLUTION INTRAMUSCULAR; INTRAVENOUS PRN
Status: DISCONTINUED | OUTPATIENT
Start: 2018-10-11 | End: 2018-10-11

## 2018-10-11 RX ORDER — PROPOFOL 10 MG/ML
INJECTION, EMULSION INTRAVENOUS CONTINUOUS PRN
Status: DISCONTINUED | OUTPATIENT
Start: 2018-10-11 | End: 2018-10-11

## 2018-10-11 RX ORDER — ONDANSETRON 2 MG/ML
4 INJECTION INTRAMUSCULAR; INTRAVENOUS EVERY 30 MIN PRN
Status: DISCONTINUED | OUTPATIENT
Start: 2018-10-11 | End: 2018-10-11 | Stop reason: HOSPADM

## 2018-10-11 RX ORDER — HYDROMORPHONE HYDROCHLORIDE 1 MG/ML
.3-.5 INJECTION, SOLUTION INTRAMUSCULAR; INTRAVENOUS; SUBCUTANEOUS EVERY 10 MIN PRN
Status: DISCONTINUED | OUTPATIENT
Start: 2018-10-11 | End: 2018-10-11 | Stop reason: HOSPADM

## 2018-10-11 RX ORDER — LIDOCAINE HYDROCHLORIDE 20 MG/ML
INJECTION, SOLUTION INFILTRATION; PERINEURAL PRN
Status: DISCONTINUED | OUTPATIENT
Start: 2018-10-11 | End: 2018-10-11

## 2018-10-11 RX ORDER — PROPOFOL 10 MG/ML
INJECTION, EMULSION INTRAVENOUS PRN
Status: DISCONTINUED | OUTPATIENT
Start: 2018-10-11 | End: 2018-10-11

## 2018-10-11 RX ORDER — FENTANYL CITRATE 50 UG/ML
25-50 INJECTION, SOLUTION INTRAMUSCULAR; INTRAVENOUS EVERY 5 MIN PRN
Status: DISCONTINUED | OUTPATIENT
Start: 2018-10-11 | End: 2018-10-11 | Stop reason: HOSPADM

## 2018-10-11 RX ORDER — DEXAMETHASONE SODIUM PHOSPHATE 4 MG/ML
INJECTION, SOLUTION INTRA-ARTICULAR; INTRALESIONAL; INTRAMUSCULAR; INTRAVENOUS; SOFT TISSUE PRN
Status: DISCONTINUED | OUTPATIENT
Start: 2018-10-11 | End: 2018-10-11

## 2018-10-11 RX ORDER — HYDRALAZINE HYDROCHLORIDE 20 MG/ML
2.5-5 INJECTION INTRAMUSCULAR; INTRAVENOUS EVERY 10 MIN PRN
Status: DISCONTINUED | OUTPATIENT
Start: 2018-10-11 | End: 2018-10-11 | Stop reason: HOSPADM

## 2018-10-11 RX ORDER — ONDANSETRON 4 MG/1
4 TABLET, ORALLY DISINTEGRATING ORAL EVERY 30 MIN PRN
Status: DISCONTINUED | OUTPATIENT
Start: 2018-10-11 | End: 2018-10-11 | Stop reason: HOSPADM

## 2018-10-11 RX ORDER — FENTANYL CITRATE 50 UG/ML
25-50 INJECTION, SOLUTION INTRAMUSCULAR; INTRAVENOUS
Status: DISCONTINUED | OUTPATIENT
Start: 2018-10-11 | End: 2018-10-11 | Stop reason: HOSPADM

## 2018-10-11 RX ORDER — NALOXONE HYDROCHLORIDE 0.4 MG/ML
.1-.4 INJECTION, SOLUTION INTRAMUSCULAR; INTRAVENOUS; SUBCUTANEOUS
Status: DISCONTINUED | OUTPATIENT
Start: 2018-10-11 | End: 2018-10-11 | Stop reason: HOSPADM

## 2018-10-11 RX ADMIN — PROPOFOL 80 MG: 10 INJECTION, EMULSION INTRAVENOUS at 14:51

## 2018-10-11 RX ADMIN — DEXAMETHASONE SODIUM PHOSPHATE 8 MG: 4 INJECTION, SOLUTION INTRA-ARTICULAR; INTRALESIONAL; INTRAMUSCULAR; INTRAVENOUS; SOFT TISSUE at 15:04

## 2018-10-11 RX ADMIN — LIDOCAINE HYDROCHLORIDE 80 MG: 20 INJECTION, SOLUTION INFILTRATION; PERINEURAL at 14:50

## 2018-10-11 RX ADMIN — SUGAMMADEX 200 MG: 100 INJECTION, SOLUTION INTRAVENOUS at 15:19

## 2018-10-11 RX ADMIN — FENTANYL CITRATE 100 MCG: 50 INJECTION, SOLUTION INTRAMUSCULAR; INTRAVENOUS at 14:48

## 2018-10-11 RX ADMIN — SODIUM CHLORIDE, POTASSIUM CHLORIDE, SODIUM LACTATE AND CALCIUM CHLORIDE: 600; 310; 30; 20 INJECTION, SOLUTION INTRAVENOUS at 14:43

## 2018-10-11 RX ADMIN — PROPOFOL 150 MCG/KG/MIN: 10 INJECTION, EMULSION INTRAVENOUS at 14:53

## 2018-10-11 RX ADMIN — MIDAZOLAM 2 MG: 1 INJECTION INTRAMUSCULAR; INTRAVENOUS at 14:43

## 2018-10-11 RX ADMIN — ONDANSETRON 4 MG: 2 INJECTION INTRAMUSCULAR; INTRAVENOUS at 15:19

## 2018-10-11 RX ADMIN — ROCURONIUM BROMIDE 50 MG: 10 INJECTION INTRAVENOUS at 14:51

## 2018-10-11 NOTE — ANESTHESIA CARE TRANSFER NOTE
Patient: Kecia Blue    Procedure(s):  Flexible And Rigid Bronchoscopy and Dilation - Wound Class: II-Clean Contaminated   - Wound Class: II-Clean Contaminated    Diagnosis: Interstitial Lung Disease  Diagnosis Additional Information: No value filed.    Anesthesia Type:   General, ETT     Note:  Airway :Face Mask  Patient transferred to:PACU  Comments: Transported to PACU on 6L FM. Exchanging well. VSS. Handoff Report: Identifed the Patient, Identified the Reponsible Provider, Reviewed the pertinent medical history, Discussed the surgical course, Reviewed Intra-OP anesthesia mangement and issues during anesthesia, Set expectations for post-procedure period and Allowed opportunity for questions and acknowledgement of understanding      Vitals: (Last set prior to Anesthesia Care Transfer)    CRNA VITALS  10/11/2018 1452 - 10/11/2018 1532      10/11/2018             SpO2: 97 %    Resp Rate (observed): 18    EKG: Sinus rhythm                Electronically Signed By: ADRIÁN Mae CRNA  October 11, 2018  3:32 PM

## 2018-10-11 NOTE — IP AVS SNAPSHOT
MRN:[id number]                      After Visit Summary   10/11/2018    Kecia Blue    MRN: [id number]           Thank you!     Thank you for choosing [facility] for your care. Our goal is always to provide you with excellent care. Hearing back from our patients is one way we can continue to improve our services. Please take a few minutes to complete the written survey that you may receive in the mail after you visit with us. Thank you!        Patient Information     Date Of Birth          1960        About your hospital stay     You were admitted on:  October 11, 2018 You last received care in the:  Same Day Surgery [facility]    You were discharged on:  October 11, 2018       Who to Call     For medical emergencies, please call 911.  For non-urgent questions about your medical care, please call your primary care provider or clinic, [phone]  For questions related to your surgery, please call your surgery clinic        Attending Provider     Provider Specialty    Dincer, Wilber Warner MD Pulmonary       Primary Care Provider Office Phone # Fax #    Rosy Vu [phone]      Your next 10 appointments already scheduled     Nov 19, 2018  8:00 AM CST   XR CHEST 2 VIEWS with UCXR1   [facility] ([facility])    [address] [address] [phone]           How do I prepare for my exam? (Food and drink instructions) No Food and Drink Restrictions.  How do I prepare for my exam? (Other instructions) You do not need to do anything special for this exam.  What should I wear: Wear comfortable clothes.  How long does the exam take: Most scans take less than 5 minutes.  What should I bring: Bring a list of your medicines, including vitamins, minerals and over-the-counter drugs. It is safest to leave personal items at home.  Do I need a :  No  is needed.  What do I need to  tell my doctor: Tell your doctor if there s any chance you are pregnant.  What should I do after the exam: No restrictions, You may resume normal activities.  What is this test: An image of a specific body part shown in shades of black and white.  Who should I call with questions: If you have any questions, please call the Imaging Department where you will have your exam. Directions, parking instructions, and other information is available on our website, [email or web address].            Nov 19, 2018  8:15 AM CST   Lab with  LAB   [facility] ([facility])    [address] [place] [address]  [address] [address]   [phone]            Nov 19, 2018  8:30 AM CST   PFT VISIT with  PFL [facility] Pulmonary Function Testing ([facility])    [address] [place] [address]   [phone]            Nov 19, 2018  9:00 AM CST   (Arrive by 8:45 AM)   Return Lung Transplant with Dean Riley MD   [facility] Solid Organ Transplant ([facility])    [address] [place] [id number]   [phone]            Dec 13, 2018  8:40 AM CST   (Arrive by 8:25 AM)   New Patient Visit with Daron Cruz PA-C   [facility] ([facility])    [address] [place] [address]   [phone]              Further instructions from your care team       Post Bronchoscopy Patient Instructions:    October 11, 2018  Kecia Blue    Your procedure completed (bronchoscopy with airway dilation) without any immediate complications.  You may cough up scant amount of blood for the next 12 hours. If you have excessive cough with blood, chest pain, shortness of breath, please report to the closest emergency room.    You may experience low grade (less than 100.5 F) fever next 24 hours, if so you can take tylenol. If the fever persists  more than 24 hours contact to our office or your primary care provider.    Our office (Thoracic/Pulmonary--[phone]) will call you as soon as the results of biopsies are ready.    Should you have any question, please do not hesitate to call our office.    DA Lin MD          Discharge Instructions after Bronchoscopy    Activity  ___ You had medicine to relax and for pain. You may feel dizzy or sleepy.  For 24 hours:    Do not drive or use heavy equipment.    Do not make important decisions.    Do not drink any alcohol.    Diet  ___ When you can swallow easily, you may go back to your regular diet, medicines  and light exercise.    Follow-up  ___ We took small tissue or fluid samples to study. We will call you with the results in about 10 business days.    Call right away if you have:    Unusual chest pain    Temperature above 100.6  F (37.5  C)    Coughing that does not stop.    If you have severe pain, bleeding, or shortness of breath, go to an emergency room.    If you have questions, call:  Monday to Friday, 7 a.m. to 4:30 p.m.  Endoscopy: [phone] (We may have to call you back)    After hours:  Hospital: [phone] (Ask for the pulmonary fellow on call)    [facility]  Same-Day Surgery   Adult Discharge Orders & Instructions     For 24 hours after surgery    1. Get plenty of rest.  A responsible adult must stay with you for at least 24 hours after you leave the hospital.   2. Do not drive or use heavy equipment.  If you have weakness or tingling, don't drive or use heavy equipment until this feeling goes away.  3. Do not drink alcohol.  4. Avoid strenuous or risky activities.  Ask for help when climbing stairs.   5. You may feel lightheaded.  IF so, sit for a few minutes before standing.  Have someone help you get up.   6. If you have nausea (feel sick to your stomach): Drink only clear liquids such as apple juice, ginger ale, broth or 7-Up.  Rest may also  "help.  Be sure to drink enough fluids.  Move to a regular diet as you feel able.  7. You may have a slight fever. Call the doctor if your fever is over 100 F (37.7 C) (taken under the tongue) or lasts longer than 24 hours.  8. You may have a dry mouth, a sore throat, muscle aches or trouble sleeping.  These should go away after 24 hours.  9. Do not make important or legal decisions.   Call your doctor for any of the followin.  Signs of infection (fever, growing tenderness at the surgery site, a large amount of drainage or bleeding, severe pain, foul-smelling drainage, redness, swelling).    2. It has been over 8 to 10 hours since surgery and you are still not able to urinate (pass water).    3.  Headache for over 24 hours.      To contact a doctor, call Dr. Lin's office at [phone]  __ or:        [phone] and ask for the resident on call for   __Pulmonology__ (answered 24 hours a day)      Emergency Department:    [facility]: [phone]       (TTY for hearing impaired: [phone])    [facility]: [phone]       (TTY for hearing impaired: [phone])          Additional Information     If you use hormonal birth control (such as the pill, patch, ring or implants): You'll need a second form of birth control for 7 days (condoms, a diaphragm or contraceptive foam). While in the hospital, you received a medicine called Bridion. Your normal birth control will not work as well for a week after taking this medicine.          Pending Results     Date and Time Order Name Status Description    10/11/2018 1004 TACROLIMUS LEVEL In process             Admission Information     Date & Time Provider Department Dept. Phone    10/11/2018 Wilber Lin MD Same Day Surgery [facility] [phone]      Your Vitals Were     Blood Pressure Temperature Respirations Height Weight Last Period    114 99.1  F (37.3  C) (Oral) 16 1.626 m (5' 4\") 53.7 kg (118 lb 6.2 oz) 2014 (Exact " Date)    Pulse Oximetry BMI (Body Mass Index)                93% 20.32 kg/m2          [facility] Information     [facility] gives you secure access to your electronic health record. If you see a primary care provider, you can also send messages to your care team and make appointments. If you have questions, please call your primary care clinic.  If you do not have a primary care provider, please call [phone] and they will assist you.        Care EveryWhere ID     This is your Care EveryWhere ID. This could be used by other organizations to access your [facility] medical records  [id number]        Equal Access to Services     [facility] : Sahara Lucas, yasmin sierra, lakshmi vaz, morro daily . So [facility] [phone].    ATENCIÓN: Si habla español, tiene a taylor disposición servicios gratuitos de asistencia lingüística. Llame al [phone].    We comply with applicable federal civil rights laws and [place] laws. We do not discriminate on the basis of race, color, national origin, age, disability, sex, sexual orientation, or gender identity.               Review of your medicines      CONTINUE these medicines which have NOT CHANGED        Dose / Directions    ACETAMINOPHEN PO        Dose:  1000 mg   Take 1,000 mg by mouth every 6 hours as needed for pain   Refills:  0       albuterol 108 (90 Base) MCG/ACT inhaler   Commonly known as:  PROAIR HFA/PROVENTIL HFA/VENTOLIN HFA   Used for:  SOB (shortness of breath), Wheezing        Dose:  2 puff   Inhale 2 puffs into the lungs every 6 hours as needed for shortness of breath / dyspnea or wheezing   Quantity:  1 Inhaler   Refills:  1       calcium carbonate 600 mg-vitamin D 400 units 600-400 MG-UNIT per tablet   Commonly known as:  CALTRATE   Used for:  S/P lung transplant (H), ILD (interstitial lung disease) (H), Lung transplant recipient (H), Encounter for long-term (current) use of high-risk medication, Anemia,  unspecified type, Cytomegalovirus (CMV) viremia (H)        Dose:  1 tablet   Take 1 tablet by mouth daily   Refills:  0       dronabinol 5 MG capsule   Commonly known as:  MARINOL   Used for:  Delayed gastric emptying        Dose:  5 mg   Take 1 capsule (5 mg) by mouth 2 times daily   Quantity:  60 capsule   Refills:  1       enoxaparin 60 MG/0.6ML injection   Commonly known as:  LOVENOX   Used for:  Deep vein thrombosis (DVT) of upper extremity, unspecified chronicity, unspecified laterality, unspecified vein (H)        Dose:  60 mg   Inject 0.6 mLs (60 mg) Subcutaneous every 12 hours   Quantity:  12 Syringe   Refills:  0       levalbuterol 45 MCG/ACT Inhaler   Commonly known as:  XOPENEX HFA   Used for:  S/P lung transplant (H), Other constipation        Dose:  2 puff   Inhale 2 puffs into the lungs every 6 hours as needed for shortness of breath / dyspnea or wheezing   Quantity:  1 Inhaler   Refills:  11       magnesium oxide 400 MG tablet   Commonly known as:  MAG-OX   Used for:  Hypomagnesemia        Dose:  400 mg   Take 1 tablet (400 mg) by mouth daily   Quantity:  90 tablet   Refills:  3       metoclopramide 5 MG tablet   Commonly known as:  REGLAN   Used for:  Delayed gastric emptying        Dose:  5 mg   Take 1 tablet (5 mg) by mouth 2 times daily   Quantity:  60 tablet   Refills:  11       metoprolol tartrate 25 MG tablet   Commonly known as:  LOPRESSOR   Used for:  Paroxysmal atrial fibrillation (H)        Dose:  25 mg   Take 1 tablet (25 mg) by mouth 2 times daily   Quantity:  60 tablet   Refills:  11       multivitamin, therapeutic with minerals Tabs tablet   Used for:  Lung transplant recipient (H)        Dose:  1 tablet   Take 1 tablet by mouth daily   Quantity:  30 each   Refills:  11       mycophenolate 250 MG capsule   Commonly known as:  GENERIC EQUIVALENT   Used for:  S/P lung transplant (H)        Dose:  1500 mg   Take 6 capsules (1,500 mg) by mouth 2 times daily   Quantity:  360 capsule    Refills:  11       NONFORMULARY        Vit K 10 mg take 10/9/18 and possible 10/10/18 depending on INR (per patient)   Refills:  0       order for DME   Used for:  Status post lung transplantation (H)        Equipment being ordered: Nebulizer   Quantity:  1 Device   Refills:  1       order for DME   Used for:  Lung transplant recipient (H)        Equipment being ordered: Nebulizer   Quantity:  1 Device   Refills:  1       pantoprazole 40 MG EC tablet   Commonly known as:  PROTONIX   Used for:  Gastroesophageal reflux disease without esophagitis, ILD (interstitial lung disease) (H), Lung transplant recipient (H)        Dose:  40 mg   Take 1 tablet (40 mg) by mouth 2 times daily   Quantity:  60 tablet   Refills:  3       pentoxifylline 400 MG CR tablet   Commonly known as:  TRENtal   Used for:  Lung transplant recipient (H)        Dose:  400 mg   Take 1 tablet (400 mg) by mouth 2 times daily   Quantity:  60 tablet   Refills:  11       * predniSONE 2.5 MG tablet   Commonly known as:  DELTASONE   Used for:  Lung replaced by transplant (H)        Dose:  2.5 mg   Take 1 tablet (2.5 mg) by mouth daily 6/22/18             7.5                    7.5 7/20/18             7.5                    5 8/24/18             5                       5 9/21/18             5                       2.5   Quantity:  90 tablet   Refills:  1       * predniSONE 5 MG tablet   Commonly known as:  DELTASONE   Used for:  S/P lung transplant (H)        Follow taper card   Quantity:  180 tablet   Refills:  3       sulfamethoxazole-trimethoprim 800-160 MG per tablet   Commonly known as:  BACTRIM DS/SEPTRA DS   Used for:  Actinomyces infection, Lung replaced by transplant (H)        Dose:  1 tablet   Take 1 tablet by mouth daily X one month.   Quantity:  30 tablet   Refills:  1       * tacrolimus 1 MG capsule   Commonly known as:  GENERIC EQUIVALENT   Used for:  S/P lung transplant (H), Other constipation        Take 5 mg in the AM and 6 mg in the PM  along with one 0.5mg in the am for total dose 5.5mg am and 6mg pm.   Quantity:  330 capsule   Refills:  11       * tacrolimus 0.5 MG capsule   Commonly known as:  GENERIC EQUIVALENT   Used for:  S/P lung transplant (H)        Dose:  0.5 mg   Take 1 capsule (0.5 mg) by mouth every morning Along with five 1mg capsules for total dose of 5.5mg am and 6mg pm.   Quantity:  30 capsule   Refills:  11       VALCYTE 450 MG tablet   Used for:  Cytomegalovirus (CMV) viremia (H), ILD (interstitial lung disease) (H), Lung transplant recipient (H), Encounter for long-term (current) use of high-risk medication, Anemia, unspecified type, S/P lung transplant (H)   Generic drug:  valGANciclovir        Dose:  450 mg   Take 450 mg by mouth daily   Quantity:  30 tablet   Refills:  6       warfarin 1 MG tablet   Commonly known as:  COUMADIN   Used for:  Status post lung transplantation (H), DVT (deep venous thrombosis) (H)        Take 4mg MWFSat and 6mg TuThSun, or as directed by the Medication Monitoring Clinic at the [facility]   Quantity:  140 tablet   Refills:  3       * Notice:  This list has 4 medication(s) that are the same as other medications prescribed for you. Read the directions carefully, and ask your doctor or other care provider to review them with you.             Protect others around you: Learn how to safely use, store and throw away your medicines at www.disposemymeds.org.             Medication List: This is a list of all your medications and when to take them. Check marks below indicate your daily home schedule. Keep this list as a reference.      Medications           Morning Afternoon Evening Bedtime As Needed    ACETAMINOPHEN PO   Take 1,000 mg by mouth every 6 hours as needed for pain                                albuterol 108 (90 Base) MCG/ACT inhaler   Commonly known as:  PROAIR HFA/PROVENTIL HFA/VENTOLIN HFA   Inhale 2 puffs into the lungs every 6 hours as needed for shortness of breath / dyspnea or wheezing                                 calcium carbonate 600 mg-vitamin D 400 units 600-400 MG-UNIT per tablet   Commonly known as:  CALTRATE   Take 1 tablet by mouth daily                                dronabinol 5 MG capsule   Commonly known as:  MARINOL   Take 1 capsule (5 mg) by mouth 2 times daily                                enoxaparin 60 MG/0.6ML injection   Commonly known as:  LOVENOX   Inject 0.6 mLs (60 mg) Subcutaneous every 12 hours                                levalbuterol 45 MCG/ACT Inhaler   Commonly known as:  XOPENEX HFA   Inhale 2 puffs into the lungs every 6 hours as needed for shortness of breath / dyspnea or wheezing                                magnesium oxide 400 MG tablet   Commonly known as:  MAG-OX   Take 1 tablet (400 mg) by mouth daily                                metoclopramide 5 MG tablet   Commonly known as:  REGLAN   Take 1 tablet (5 mg) by mouth 2 times daily                                metoprolol tartrate 25 MG tablet   Commonly known as:  LOPRESSOR   Take 1 tablet (25 mg) by mouth 2 times daily                                multivitamin, therapeutic with minerals Tabs tablet   Take 1 tablet by mouth daily                                mycophenolate 250 MG capsule   Commonly known as:  GENERIC EQUIVALENT   Take 6 capsules (1,500 mg) by mouth 2 times daily                                NONFORMULARY   Vit K 10 mg take 10/9/18 and possible 10/10/18 depending on INR (per patient)                                order for DME   Equipment being ordered: Nebulizer                                order for DME   Equipment being ordered: Nebulizer                                pantoprazole 40 MG EC tablet   Commonly known as:  PROTONIX   Take 1 tablet (40 mg) by mouth 2 times daily                                pentoxifylline 400 MG CR tablet   Commonly known as:  TRENtal   Take 1 tablet (400 mg) by mouth 2 times daily                                * predniSONE 2.5 MG tablet   Commonly  known as:  DELTASONE   Take 1 tablet (2.5 mg) by mouth daily 6/22/18             7.5                    7.5 7/20/18             7.5                    5 8/24/18             5                       5 9/21/18             5                       2.5                                * predniSONE 5 MG tablet   Commonly known as:  DELTASONE   Follow taper card                                sulfamethoxazole-trimethoprim 800-160 MG per tablet   Commonly known as:  BACTRIM DS/SEPTRA DS   Take 1 tablet by mouth daily X one month.                                * tacrolimus 1 MG capsule   Commonly known as:  GENERIC EQUIVALENT   Take 5 mg in the AM and 6 mg in the PM along with one 0.5mg in the am for total dose 5.5mg am and 6mg pm.                                * tacrolimus 0.5 MG capsule   Commonly known as:  GENERIC EQUIVALENT   Take 1 capsule (0.5 mg) by mouth every morning Along with five 1mg capsules for total dose of 5.5mg am and 6mg pm.                                VALCYTE 450 MG tablet   Take 450 mg by mouth daily   Generic drug:  valGANciclovir                                warfarin 1 MG tablet   Commonly known as:  COUMADIN   Take 4mg MWFSat and 6mg TuThSun, or as directed by the Medication Monitoring Clinic at the  of .                                * Notice:  This list has 4 medication(s) that are the same as other medications prescribed for you. Read the directions carefully, and ask your doctor or other care provider to review them with you.

## 2018-10-11 NOTE — PROCEDURES
INTERVENTIONAL PULMONOLOGY       Procedure(s):    A flexible and rigid bronchoscopy   Airway exam  Airway stent removal (1 stents)  Balloon bronchoplasty without stent placement (1 sites)   Therapeutic suctioning (1 sites)    Indication:  Lung transplant patient with anastamotic stenosis and persistent shortness of breath    Attending of Record:  Alana Lin MD    Interventional Pulmonary Fellow   Preet Patel MD     Trainees Present:   None    Medications:    General Anesthesia - See anesthesia flowsheet for details    Sedation Time:   Per Anesthesia Care Provider    Time Out:  Performed    The patient's medical record has been reviewed.  The indication for the procedure was reviewed.  The necessary history and physical examination was performed and reviewed.  The risks, benefits and alternatives of the procedure were discussed with the the patient in detail and she had the opportunity to ask questions.  I discussed in particular the potential complications including risks of minor or life-threatening bleeding and/or infection, respiratory failure, vocal cord trauma / paralysis, pneumothorax, and discomfort. Sedation risks were also discussed including abnormal heart rhythms, low blood pressure, and respiratory failure. All questions were answered to the best of my ability.  Verbal and written informed consent was obtained.  The proposed procedure and the patient's identification were verified prior to the procedure by the physician and the surgical team.    The patient was assessed for the adequacy for the procedure and to receive medications.   Mental Status:  Alert and oriented x 3  Airway examination:  Class III (Visualization of only the base of uvula)  Pulmonary:  Decreased breathsound throughout  CV:  RRR, no murmurs or gallops  ASA Grade:  (III)  Severe systemic disease    After clinical evaluation and reviewing the indication, risks, alternatives and benefits of the procedure the patient was  deemed to be in satisfactory condition to undergo the procedure.           A Tuberculosis risk assessment was performed:  The patient has no known RISK of Tuberculosis    The procedure was performed in a negative airflow room: The patient could not be moved to a negative airflow room because of needed OR for the procedure    Maneuvers / Procedure:      A Flexible and 8.5mm Rigid bronchoscope bronchoscope was used for the procedure. The rigid bronchoscope was inserted into the mouth. Uvula, epiglottis and vocal cords were seen. The scope was advanced turning the bevel to 90degress while passing through the cords and into the trachea.     Airway dilation: Airway dilation#1: The 8-9-10mm Elation Ballooon was used to dilate the Bronchus intermedius  airway. Each dilation was held for 60sec and repeated 3 times    Airway Examination: A complete airway examination was performed from the distal trachea to the subsegmental level in each lobe of both lungs.  Pertinent findings include widely patent anastamosis. Concentric stenosis of BI with subjective narrowing of RML without evident granulation tissue.         Stent removal: A total of 1 stent(s) were removed using optical forceps from the right main stem    Therapeutic suctionin min of operative time was spent clearing out the airway of debris, blood and mucous prior to the intervention.     Any disposable equipment was visually inspected and deemed to be intact immediately post procedure.      Relevant Pictures    Post stent removal R main      Post dilation R main      RUL      BI      RML and RLL        Recommendations:     -->  Repeat bronchoscopy in 2 weeks  -->  Continue nebulizer therapies  -->  Transplant team updated      Preet Patel MD  Interventional Pulmonary  Department of Pulmonary, Allergy, Critical Care and Sleep Medicine   [facility]    Krystle Salgado CNS, OCN  Clinical Nurse Specialist  Department of Thoracic  Surgery  Office: [phone]  Email: [email or web address]    Chloe Avila  Interventional Pulmonology Surgery Scheduler  Office: [phone]  Email: [email or web address]      I was present with the patient, Kecia Blue, for the entire viewing portion of the bronchscopy procedure (including scope insertion and withdrawal) and agree with the interpretation and report as documented by Dr. Patel.   Alana Lin MD

## 2018-10-11 NOTE — DISCHARGE INSTRUCTIONS
Post Bronchoscopy Patient Instructions:    October 11, 2018  Kecia Blue    Your procedure completed (bronchoscopy with airway dilation) without any immediate complications.  You may cough up scant amount of blood for the next 12 hours. If you have excessive cough with blood, chest pain, shortness of breath, please report to the closest emergency room.    You may experience low grade (less than 100.5 F) fever next 24 hours, if so you can take tylenol. If the fever persists more than 24 hours contact to our office or your primary care provider.    Our office (Thoracic/Pulmonary--[phone]) will call you as soon as the results of biopsies are ready.    Should you have any question, please do not hesitate to call our office.    DA Lin MD          Discharge Instructions after Bronchoscopy    Activity  ___ You had medicine to relax and for pain. You may feel dizzy or sleepy.  For 24 hours:    Do not drive or use heavy equipment.    Do not make important decisions.    Do not drink any alcohol.    Diet  ___ When you can swallow easily, you may go back to your regular diet, medicines  and light exercise.    Follow-up  ___ We took small tissue or fluid samples to study. We will call you with the results in about 10 business days.    Call right away if you have:    Unusual chest pain    Temperature above 100.6  F (37.5  C)    Coughing that does not stop.    If you have severe pain, bleeding, or shortness of breath, go to an emergency room.    If you have questions, call:  Monday to Friday, 7 a.m. to 4:30 p.m.  Endoscopy: [phone] (We may have to call you back)    After hours:  Hospital: [phone] (Ask for the pulmonary fellow on call)    [facility]  Same-Day Surgery   Adult Discharge Orders & Instructions     For 24 hours after surgery    1. Get plenty of rest.  A responsible adult must stay with you for at least 24 hours after you leave the hospital.   2. Do not  drive or use heavy equipment.  If you have weakness or tingling, don't drive or use heavy equipment until this feeling goes away.  3. Do not drink alcohol.  4. Avoid strenuous or risky activities.  Ask for help when climbing stairs.   5. You may feel lightheaded.  IF so, sit for a few minutes before standing.  Have someone help you get up.   6. If you have nausea (feel sick to your stomach): Drink only clear liquids such as apple juice, ginger ale, broth or 7-Up.  Rest may also help.  Be sure to drink enough fluids.  Move to a regular diet as you feel able.  7. You may have a slight fever. Call the doctor if your fever is over 100 F (37.7 C) (taken under the tongue) or lasts longer than 24 hours.  8. You may have a dry mouth, a sore throat, muscle aches or trouble sleeping.  These should go away after 24 hours.  9. Do not make important or legal decisions.   Call your doctor for any of the followin.  Signs of infection (fever, growing tenderness at the surgery site, a large amount of drainage or bleeding, severe pain, foul-smelling drainage, redness, swelling).    2. It has been over 8 to 10 hours since surgery and you are still not able to urinate (pass water).    3.  Headache for over 24 hours.      To contact a doctor, call Dr. Lin's office at [phone]  __ or:        [phone] and ask for the resident on call for   __Pulmonology__ (answered 24 hours a day)      Emergency Department:    [facility]: [phone]       (TTY for hearing impaired: [phone])    [facility]: [phone]       (TTY for hearing impaired: [phone])

## 2018-10-11 NOTE — TELEPHONE ENCOUNTER
Per Dr. Christensen, ultrasound negative for DVT and patient can stop coumadin. Updated spouse, Ranjan, and coumadin clinic.

## 2018-10-11 NOTE — MR AVS SNAPSHOT
Kecia Blue   10/11/2018   Anticoagulation Therapy Visit    Description:  57 year old female   Provider:  Joshua Davis, RN   Department:  [facility]           INR as of 10/11/2018     Today's INR No new INR was available at the time of this encounter.      Anticoagulation Summary as of 10/11/2018     INR goal 2.0-3.0   Today's INR No new INR was available at the time of this encounter.   Full warfarin instructions 10/11: 4 mg; 10/12: 6 mg; 10/13: 4 mg; 10/14: 4 mg; 10/15: 6 mg; Otherwise No maintenance plan   Next INR check     Indications   Lung transplant recipient (H) [Z94.2]  Deep vein thrombosis (DVT) (H) [I82.409] [I82.409]         Anticoagulation Episode Summary     Resolved date 10/11/2018    Resolved reason Therapy  Complete

## 2018-10-11 NOTE — PROGRESS NOTES
10/11/18  Received message from Evelyn to stop Coumadin    Spoke to patient's .  Resolved episode of care, inactivated patient from the [facility] monitoring service.    Joshua Davis, Evelyn Damian RN  P [facility]                     Ultrasound negative for DVT, we are stopping coumadin     thanks

## 2018-10-12 ENCOUNTER — TELEPHONE (OUTPATIENT)
Dept: TRANSPLANT | Facility: CLINIC | Age: 58
End: 2018-10-12

## 2018-10-12 DIAGNOSIS — Z94.2 S/P LUNG TRANSPLANT (H): ICD-10-CM

## 2018-10-12 DIAGNOSIS — K59.09 OTHER CONSTIPATION: ICD-10-CM

## 2018-10-12 RX ORDER — TACROLIMUS 1 MG/1
5 CAPSULE ORAL 2 TIMES DAILY
Qty: 300 CAPSULE | Refills: 11 | Status: SHIPPED | OUTPATIENT
Start: 2018-10-12 | End: 2018-10-23

## 2018-10-12 RX ORDER — TACROLIMUS 0.5 MG/1
0.5 CAPSULE ORAL EVERY MORNING
Qty: 30 CAPSULE | Refills: 11 | Status: SHIPPED | OUTPATIENT
Start: 2018-10-12 | End: 2018-10-23

## 2018-10-12 NOTE — TELEPHONE ENCOUNTER
Patient Call: General    Reason for call: Patient returning call from Coordinator- is abailable at cell #     Call back needed? Yes    Return Call Needed  Same as documented in contacts section  When to return call?: Greater than one day: Route standard priority

## 2018-10-12 NOTE — ANESTHESIA POSTPROCEDURE EVALUATION
Patient: Kecia Blue    Procedure(s):  Flexible And Rigid Bronchoscopy and Dilation - Wound Class: II-Clean Contaminated    Diagnosis:Interstitial Lung Disease  Diagnosis Additional Information: No value filed.    Anesthesia Type:  General, ETT    Note:  Anesthesia Post Evaluation    Last vitals:  Vitals:    10/11/18 1715 10/11/18 1725 10/11/18 1730   BP:      Resp: 18  18   Temp: 37  C (98.6  F)     SpO2:  93%          Electronically Signed By: Amador Monzon MD  October 12, 2018  1:31 PM

## 2018-10-12 NOTE — PROGRESS NOTES
Tacrolimus level 17.2 at 12 hours, on 10/10/18  Goal 9-11.   Current dose 5.5 mg in AM, 6 mg in PM    Dose changed to  5.5 mg in AM, 5 mg in PM   Recheck level in 7 days    Discussed with Pt    MyChart message sent

## 2018-10-16 DIAGNOSIS — J98.09 BRONCHIAL STENOSIS: ICD-10-CM

## 2018-10-16 DIAGNOSIS — Z94.2 H/O LUNG TRANSPLANT (H): Primary | ICD-10-CM

## 2018-10-16 DIAGNOSIS — Z94.2 LUNG REPLACED BY TRANSPLANT (H): Primary | ICD-10-CM

## 2018-10-17 ENCOUNTER — TELEPHONE (OUTPATIENT)
Dept: PULMONOLOGY | Facility: CLINIC | Age: 58
End: 2018-10-17

## 2018-10-17 NOTE — TELEPHONE ENCOUNTER
Spoke with patient to schedule procedure with Dr. Alana Lin    Procedure was scheduled on 10/29 at [facility] [place]  Patient will have H&P with Pulmonary, [facility]  Patient is aware a / is needed day of surgery.   Surgery letter was sent via [facility], patient has my direct contact information for any further questions.

## 2018-10-18 DIAGNOSIS — Z94.2 LUNG REPLACED BY TRANSPLANT (H): ICD-10-CM

## 2018-10-18 PROCEDURE — 80197 ASSAY OF TACROLIMUS: CPT | Performed by: INTERNAL MEDICINE

## 2018-10-22 ENCOUNTER — TELEPHONE (OUTPATIENT)
Dept: TRANSPLANT | Facility: CLINIC | Age: 58
End: 2018-10-22

## 2018-10-22 DIAGNOSIS — Z94.2 LUNG REPLACED BY TRANSPLANT (H): ICD-10-CM

## 2018-10-22 LAB
TACROLIMUS BLD-MCNC: 38.4 UG/L (ref 5–15)
TME LAST DOSE: ABNORMAL H

## 2018-10-22 PROCEDURE — 80197 ASSAY OF TACROLIMUS: CPT | Performed by: INTERNAL MEDICINE

## 2018-10-22 NOTE — TELEPHONE ENCOUNTER
Tacrolimus level on 10/18 was 39.2 at 11 hrs. Patient confirms she did not take her dose prior to lab draw. She denies any increased tremors, headaches or HTN. She did not have a BMP drawn this day. She did have 24 hr stomach flu on Friday (the day after her FK level was drawn).     Labs reviewed in care everywhere from today and creatinine 1.3 (baseline). Elevated tacrolimus doesn't seem accurate. She held this AM dose today and  she'll resume her current dose tonight. No dose changes until we received her tac level from today.

## 2018-10-23 ENCOUNTER — TELEPHONE (OUTPATIENT)
Dept: TRANSPLANT | Facility: CLINIC | Age: 58
End: 2018-10-23

## 2018-10-23 DIAGNOSIS — Z94.2 S/P LUNG TRANSPLANT (H): ICD-10-CM

## 2018-10-23 DIAGNOSIS — K59.09 OTHER CONSTIPATION: ICD-10-CM

## 2018-10-23 LAB
TACROLIMUS BLD-MCNC: 14.8 UG/L (ref 5–15)
TME LAST DOSE: NORMAL H

## 2018-10-23 RX ORDER — TACROLIMUS 1 MG/1
4 CAPSULE ORAL 2 TIMES DAILY
Qty: 240 CAPSULE | Refills: 11 | Status: SHIPPED | OUTPATIENT
Start: 2018-10-23 | End: 2018-11-28

## 2018-10-23 RX ORDER — TACROLIMUS 0.5 MG/1
0.5 CAPSULE ORAL 2 TIMES DAILY
Qty: 60 CAPSULE | Refills: 11 | Status: SHIPPED | OUTPATIENT
Start: 2018-10-23 | End: 2018-11-28

## 2018-10-23 NOTE — TELEPHONE ENCOUNTER
Tacrolimus level 14.8 at 12.5 hours, on 10/22/18.  Time confirmed by patient draw was at 1055.  Goal 9-11.   Current dose 5.5 mg in AM, 5 mg in PM    Dose changed to  4.5 mg in AM, 4.5 mg in PM   Recheck level in 7 days    Discussed with Kecia Lin message sent

## 2018-10-29 ENCOUNTER — ANESTHESIA EVENT (OUTPATIENT)
Dept: SURGERY | Facility: CLINIC | Age: 58
End: 2018-10-29
Payer: COMMERCIAL

## 2018-10-29 ENCOUNTER — HOSPITAL ENCOUNTER (EMERGENCY)
Facility: CLINIC | Age: 58
Discharge: HOME | End: 2018-10-29
Attending: EMERGENCY MEDICINE | Admitting: EMERGENCY MEDICINE
Payer: COMMERCIAL

## 2018-10-29 ENCOUNTER — HOSPITAL ENCOUNTER (OUTPATIENT)
Facility: CLINIC | Age: 58
Discharge: HOME | End: 2018-10-29
Attending: INTERNAL MEDICINE | Admitting: INTERNAL MEDICINE
Payer: COMMERCIAL

## 2018-10-29 ENCOUNTER — APPOINTMENT (OUTPATIENT)
Dept: GENERAL RADIOLOGY | Facility: CLINIC | Age: 58
End: 2018-10-29
Attending: INTERNAL MEDICINE
Payer: COMMERCIAL

## 2018-10-29 ENCOUNTER — OFFICE VISIT (OUTPATIENT)
Dept: TRANSPLANT | Facility: CLINIC | Age: 58
End: 2018-10-29
Attending: INTERNAL MEDICINE
Payer: COMMERCIAL

## 2018-10-29 ENCOUNTER — ANESTHESIA (OUTPATIENT)
Dept: SURGERY | Facility: CLINIC | Age: 58
End: 2018-10-29
Payer: COMMERCIAL

## 2018-10-29 ENCOUNTER — APPOINTMENT (OUTPATIENT)
Dept: GENERAL RADIOLOGY | Facility: CLINIC | Age: 58
End: 2018-10-29
Attending: EMERGENCY MEDICINE
Payer: COMMERCIAL

## 2018-10-29 VITALS
BODY MASS INDEX: 18.78 KG/M2 | OXYGEN SATURATION: 99 % | TEMPERATURE: 98.4 F | WEIGHT: 110 LBS | HEIGHT: 64 IN | DIASTOLIC BLOOD PRESSURE: 82 MMHG | RESPIRATION RATE: 20 BRPM | SYSTOLIC BLOOD PRESSURE: 143 MMHG

## 2018-10-29 VITALS
HEART RATE: 84 BPM | BODY MASS INDEX: 19.04 KG/M2 | SYSTOLIC BLOOD PRESSURE: 114 MMHG | DIASTOLIC BLOOD PRESSURE: 75 MMHG | TEMPERATURE: 98.1 F | WEIGHT: 111.55 LBS | HEIGHT: 64 IN | RESPIRATION RATE: 18 BRPM | OXYGEN SATURATION: 98 %

## 2018-10-29 VITALS
OXYGEN SATURATION: 97 % | HEART RATE: 99 BPM | BODY MASS INDEX: 19.05 KG/M2 | RESPIRATION RATE: 18 BRPM | SYSTOLIC BLOOD PRESSURE: 123 MMHG | HEIGHT: 64 IN | DIASTOLIC BLOOD PRESSURE: 79 MMHG | TEMPERATURE: 98.1 F | WEIGHT: 111.6 LBS

## 2018-10-29 DIAGNOSIS — Z94.2 LUNG TRANSPLANT RECIPIENT (H): ICD-10-CM

## 2018-10-29 DIAGNOSIS — Z23 NEED FOR PROPHYLACTIC VACCINATION AND INOCULATION AGAINST INFLUENZA: ICD-10-CM

## 2018-10-29 DIAGNOSIS — R11.2 INTRACTABLE VOMITING WITH NAUSEA, UNSPECIFIED VOMITING TYPE: Primary | ICD-10-CM

## 2018-10-29 DIAGNOSIS — E86.1 HYPOVOLEMIA DEHYDRATION: ICD-10-CM

## 2018-10-29 DIAGNOSIS — R05.9 COUGH: ICD-10-CM

## 2018-10-29 DIAGNOSIS — Z79.899 ENCOUNTER FOR LONG-TERM (CURRENT) USE OF HIGH-RISK MEDICATION: ICD-10-CM

## 2018-10-29 DIAGNOSIS — R11.2 NON-INTRACTABLE VOMITING WITH NAUSEA, UNSPECIFIED VOMITING TYPE: ICD-10-CM

## 2018-10-29 LAB
ALBUMIN SERPL-MCNC: 2.9 G/DL (ref 3.4–5)
ALP SERPL-CCNC: 73 U/L (ref 40–150)
ALT SERPL W P-5'-P-CCNC: 11 U/L (ref 0–50)
ANION GAP SERPL CALCULATED.3IONS-SCNC: 11 MMOL/L (ref 3–14)
AST SERPL W P-5'-P-CCNC: 10 U/L (ref 0–45)
BASOPHILS # BLD AUTO: 0 10E9/L (ref 0–0.2)
BASOPHILS NFR BLD AUTO: 0.2 %
BILIRUB SERPL-MCNC: 0.3 MG/DL (ref 0.2–1.3)
BUN SERPL-MCNC: 25 MG/DL (ref 7–30)
CALCIUM SERPL-MCNC: 9.1 MG/DL (ref 8.5–10.1)
CHLORIDE SERPL-SCNC: 101 MMOL/L (ref 94–109)
CO2 SERPL-SCNC: 23 MMOL/L (ref 20–32)
CREAT SERPL-MCNC: 1.11 MG/DL (ref 0.52–1.04)
DIFFERENTIAL METHOD BLD: ABNORMAL
EOSINOPHIL # BLD AUTO: 0 10E9/L (ref 0–0.7)
EOSINOPHIL NFR BLD AUTO: 0.2 %
ERYTHROCYTE [DISTWIDTH] IN BLOOD BY AUTOMATED COUNT: 13.7 % (ref 10–15)
GFR SERPL CREATININE-BSD FRML MDRD: 51 ML/MIN/1.7M2
GLUCOSE BLDC GLUCOMTR-MCNC: 114 MG/DL (ref 70–99)
GLUCOSE BLDC GLUCOMTR-MCNC: 138 MG/DL (ref 70–99)
GLUCOSE SERPL-MCNC: 117 MG/DL (ref 70–99)
GRAM STN SPEC: NORMAL
GRAM STN SPEC: NORMAL
HCT VFR BLD AUTO: 26 % (ref 35–47)
HGB BLD-MCNC: 7.8 G/DL (ref 11.7–15.7)
IMM GRANULOCYTES # BLD: 0.1 10E9/L (ref 0–0.4)
IMM GRANULOCYTES NFR BLD: 1.6 %
INR PPP: 1.01 (ref 0.86–1.14)
KOH PREP SPEC: NORMAL
LYMPHOCYTES # BLD AUTO: 0.2 10E9/L (ref 0.8–5.3)
LYMPHOCYTES NFR BLD AUTO: 2.7 %
MCH RBC QN AUTO: 29.7 PG (ref 26.5–33)
MCHC RBC AUTO-ENTMCNC: 30 G/DL (ref 31.5–36.5)
MCV RBC AUTO: 99 FL (ref 78–100)
MONOCYTES # BLD AUTO: 0.3 10E9/L (ref 0–1.3)
MONOCYTES NFR BLD AUTO: 2.9 %
NEUTROPHILS # BLD AUTO: 8.2 10E9/L (ref 1.6–8.3)
NEUTROPHILS NFR BLD AUTO: 92.4 %
NRBC # BLD AUTO: 0 10*3/UL
NRBC BLD AUTO-RTO: 0 /100
PLATELET # BLD AUTO: 385 10E9/L (ref 150–450)
POTASSIUM SERPL-SCNC: 3.8 MMOL/L (ref 3.4–5.3)
PROT SERPL-MCNC: 6.9 G/DL (ref 6.8–8.8)
RBC # BLD AUTO: 2.63 10E12/L (ref 3.8–5.2)
SODIUM SERPL-SCNC: 135 MMOL/L (ref 133–144)
SPECIMEN SOURCE: NORMAL
SPECIMEN SOURCE: NORMAL
TACROLIMUS BLD-MCNC: 15.5 UG/L (ref 5–15)
TME LAST DOSE: ABNORMAL H
UPPER GI ENDOSCOPY: NORMAL
WBC # BLD AUTO: 8.9 10E9/L (ref 4–11)

## 2018-10-29 PROCEDURE — 87102 FUNGUS ISOLATION CULTURE: CPT | Performed by: INTERNAL MEDICINE

## 2018-10-29 PROCEDURE — 40000277 XR SURGERY CARM FLUORO LESS THAN 5 MIN W STILLS: Mod: TC

## 2018-10-29 PROCEDURE — 25000128 H RX IP 250 OP 636: Performed by: ANESTHESIOLOGY

## 2018-10-29 PROCEDURE — 25000125 ZZHC RX 250: Performed by: NURSE ANESTHETIST, CERTIFIED REGISTERED

## 2018-10-29 PROCEDURE — 90686 IIV4 VACC NO PRSV 0.5 ML IM: CPT | Mod: ZF | Performed by: INTERNAL MEDICINE

## 2018-10-29 PROCEDURE — 40000986 XR CHEST PORT 1 VW

## 2018-10-29 PROCEDURE — 88305 TISSUE EXAM BY PATHOLOGIST: CPT | Performed by: INTERNAL MEDICINE

## 2018-10-29 PROCEDURE — 99284 EMERGENCY DEPT VISIT MOD MDM: CPT | Mod: Z6 | Performed by: EMERGENCY MEDICINE

## 2018-10-29 PROCEDURE — 37000008 ZZH ANESTHESIA TECHNICAL FEE, 1ST 30 MIN: Performed by: INTERNAL MEDICINE

## 2018-10-29 PROCEDURE — 87181 SC STD AGAR DILUTION PER AGT: CPT | Performed by: INTERNAL MEDICINE

## 2018-10-29 PROCEDURE — 87081 CULTURE SCREEN ONLY: CPT | Performed by: INTERNAL MEDICINE

## 2018-10-29 PROCEDURE — 40000170 ZZH STATISTIC PRE-PROCEDURE ASSESSMENT II: Performed by: INTERNAL MEDICINE

## 2018-10-29 PROCEDURE — 36000057 ZZH SURGERY LEVEL 3 1ST 30 MIN - UMMC: Performed by: INTERNAL MEDICINE

## 2018-10-29 PROCEDURE — 25000128 H RX IP 250 OP 636: Mod: ZF | Performed by: INTERNAL MEDICINE

## 2018-10-29 PROCEDURE — 87633 RESP VIRUS 12-25 TARGETS: CPT | Performed by: INTERNAL MEDICINE

## 2018-10-29 PROCEDURE — 85025 COMPLETE CBC W/AUTO DIFF WBC: CPT | Performed by: EMERGENCY MEDICINE

## 2018-10-29 PROCEDURE — 88312 SPECIAL STAINS GROUP 1: CPT | Performed by: INTERNAL MEDICINE

## 2018-10-29 PROCEDURE — 87116 MYCOBACTERIA CULTURE: CPT | Performed by: INTERNAL MEDICINE

## 2018-10-29 PROCEDURE — 71000014 ZZH RECOVERY PHASE 1 LEVEL 2 FIRST HR: Performed by: INTERNAL MEDICINE

## 2018-10-29 PROCEDURE — 36000059 ZZH SURGERY LEVEL 3 EA 15 ADDTL MIN UMMC: Performed by: INTERNAL MEDICINE

## 2018-10-29 PROCEDURE — 25000128 H RX IP 250 OP 636: Performed by: NURSE ANESTHETIST, CERTIFIED REGISTERED

## 2018-10-29 PROCEDURE — 88108 CYTOPATH CONCENTRATE TECH: CPT | Performed by: INTERNAL MEDICINE

## 2018-10-29 PROCEDURE — 25000128 H RX IP 250 OP 636: Performed by: EMERGENCY MEDICINE

## 2018-10-29 PROCEDURE — 87077 CULTURE AEROBIC IDENTIFY: CPT | Performed by: INTERNAL MEDICINE

## 2018-10-29 PROCEDURE — 85610 PROTHROMBIN TIME: CPT | Performed by: EMERGENCY MEDICINE

## 2018-10-29 PROCEDURE — 86356 MONONUCLEAR CELL ANTIGEN: CPT | Performed by: INTERNAL MEDICINE

## 2018-10-29 PROCEDURE — 87206 SMEAR FLUORESCENT/ACID STAI: CPT | Performed by: INTERNAL MEDICINE

## 2018-10-29 PROCEDURE — 99284 EMERGENCY DEPT VISIT MOD MDM: CPT | Mod: 25 | Performed by: EMERGENCY MEDICINE

## 2018-10-29 PROCEDURE — 80053 COMPREHEN METABOLIC PANEL: CPT | Performed by: EMERGENCY MEDICINE

## 2018-10-29 PROCEDURE — 87015 SPECIMEN INFECT AGNT CONCNTJ: CPT | Performed by: INTERNAL MEDICINE

## 2018-10-29 PROCEDURE — 96361 HYDRATE IV INFUSION ADD-ON: CPT | Performed by: EMERGENCY MEDICINE

## 2018-10-29 PROCEDURE — 71000027 ZZH RECOVERY PHASE 2 EACH 15 MINS: Performed by: INTERNAL MEDICINE

## 2018-10-29 PROCEDURE — G0463 HOSPITAL OUTPT CLINIC VISIT: HCPCS | Mod: 25,ZF

## 2018-10-29 PROCEDURE — 80197 ASSAY OF TACROLIMUS: CPT | Performed by: EMERGENCY MEDICINE

## 2018-10-29 PROCEDURE — 87210 SMEAR WET MOUNT SALINE/INK: CPT | Performed by: INTERNAL MEDICINE

## 2018-10-29 PROCEDURE — 82962 GLUCOSE BLOOD TEST: CPT

## 2018-10-29 PROCEDURE — 71046 X-RAY EXAM CHEST 2 VIEWS: CPT

## 2018-10-29 PROCEDURE — 88342 IMHCHEM/IMCYTCHM 1ST ANTB: CPT | Performed by: INTERNAL MEDICINE

## 2018-10-29 PROCEDURE — G0008 ADMIN INFLUENZA VIRUS VAC: HCPCS | Mod: ZF

## 2018-10-29 PROCEDURE — C1726 CATH, BAL DIL, NON-VASCULAR: HCPCS | Performed by: INTERNAL MEDICINE

## 2018-10-29 PROCEDURE — 37000009 ZZH ANESTHESIA TECHNICAL FEE, EACH ADDTL 15 MIN: Performed by: INTERNAL MEDICINE

## 2018-10-29 PROCEDURE — 25000128 H RX IP 250 OP 636: Performed by: INTERNAL MEDICINE

## 2018-10-29 PROCEDURE — 27210794 ZZH OR GENERAL SUPPLY STERILE: Performed by: INTERNAL MEDICINE

## 2018-10-29 PROCEDURE — 87070 CULTURE OTHR SPECIMN AEROBIC: CPT | Performed by: INTERNAL MEDICINE

## 2018-10-29 PROCEDURE — 96374 THER/PROPH/DIAG INJ IV PUSH: CPT | Mod: 59 | Performed by: EMERGENCY MEDICINE

## 2018-10-29 PROCEDURE — C1874 STENT, COATED/COV W/DEL SYS: HCPCS | Performed by: INTERNAL MEDICINE

## 2018-10-29 PROCEDURE — 87205 SMEAR GRAM STAIN: CPT | Performed by: INTERNAL MEDICINE

## 2018-10-29 DEVICE — IMPLANTABLE DEVICE: Type: IMPLANTABLE DEVICE | Site: LUNG | Status: FUNCTIONAL

## 2018-10-29 DEVICE — STENT TRACHEOBRONCHIAL AERO 12X30MM 90129-211
Type: IMPLANTABLE DEVICE | Site: LUNG | Status: NON-FUNCTIONAL
Removed: 2018-11-20

## 2018-10-29 RX ORDER — HYDRALAZINE HYDROCHLORIDE 20 MG/ML
2.5-5 INJECTION INTRAMUSCULAR; INTRAVENOUS EVERY 10 MIN PRN
Status: DISCONTINUED | OUTPATIENT
Start: 2018-10-29 | End: 2018-10-29 | Stop reason: HOSPADM

## 2018-10-29 RX ORDER — HYDROMORPHONE HYDROCHLORIDE 1 MG/ML
.3-.5 INJECTION, SOLUTION INTRAMUSCULAR; INTRAVENOUS; SUBCUTANEOUS EVERY 10 MIN PRN
Status: DISCONTINUED | OUTPATIENT
Start: 2018-10-29 | End: 2018-10-29 | Stop reason: HOSPADM

## 2018-10-29 RX ORDER — LIDOCAINE 40 MG/G
CREAM TOPICAL
Status: DISCONTINUED | OUTPATIENT
Start: 2018-10-29 | End: 2018-10-29 | Stop reason: HOSPADM

## 2018-10-29 RX ORDER — CODEINE PHOSPHATE AND GUAIFENESIN 10; 100 MG/5ML; MG/5ML
5 SOLUTION ORAL EVERY 6 HOURS PRN
Status: DISCONTINUED | OUTPATIENT
Start: 2018-10-29 | End: 2018-10-29

## 2018-10-29 RX ORDER — NALOXONE HYDROCHLORIDE 0.4 MG/ML
.1-.4 INJECTION, SOLUTION INTRAMUSCULAR; INTRAVENOUS; SUBCUTANEOUS
Status: DISCONTINUED | OUTPATIENT
Start: 2018-10-29 | End: 2018-10-29 | Stop reason: HOSPADM

## 2018-10-29 RX ORDER — ONDANSETRON 2 MG/ML
4 INJECTION INTRAMUSCULAR; INTRAVENOUS
Status: DISCONTINUED | OUTPATIENT
Start: 2018-10-29 | End: 2018-10-29 | Stop reason: HOSPADM

## 2018-10-29 RX ORDER — LIDOCAINE HYDROCHLORIDE 20 MG/ML
INJECTION, SOLUTION INFILTRATION; PERINEURAL PRN
Status: DISCONTINUED | OUTPATIENT
Start: 2018-10-29 | End: 2018-10-29

## 2018-10-29 RX ORDER — PROPOFOL 10 MG/ML
INJECTION, EMULSION INTRAVENOUS CONTINUOUS PRN
Status: DISCONTINUED | OUTPATIENT
Start: 2018-10-29 | End: 2018-10-29

## 2018-10-29 RX ORDER — SODIUM CHLORIDE, SODIUM LACTATE, POTASSIUM CHLORIDE, CALCIUM CHLORIDE 600; 310; 30; 20 MG/100ML; MG/100ML; MG/100ML; MG/100ML
INJECTION, SOLUTION INTRAVENOUS CONTINUOUS
Status: DISCONTINUED | OUTPATIENT
Start: 2018-10-29 | End: 2018-10-29 | Stop reason: HOSPADM

## 2018-10-29 RX ORDER — PROPOFOL 10 MG/ML
INJECTION, EMULSION INTRAVENOUS PRN
Status: DISCONTINUED | OUTPATIENT
Start: 2018-10-29 | End: 2018-10-29

## 2018-10-29 RX ORDER — ONDANSETRON 2 MG/ML
INJECTION INTRAMUSCULAR; INTRAVENOUS PRN
Status: DISCONTINUED | OUTPATIENT
Start: 2018-10-29 | End: 2018-10-29

## 2018-10-29 RX ORDER — ONDANSETRON 4 MG/1
4 TABLET, ORALLY DISINTEGRATING ORAL EVERY 30 MIN PRN
Status: DISCONTINUED | OUTPATIENT
Start: 2018-10-29 | End: 2018-10-29 | Stop reason: HOSPADM

## 2018-10-29 RX ORDER — FENTANYL CITRATE 50 UG/ML
25-50 INJECTION, SOLUTION INTRAMUSCULAR; INTRAVENOUS
Status: DISCONTINUED | OUTPATIENT
Start: 2018-10-29 | End: 2018-10-29 | Stop reason: HOSPADM

## 2018-10-29 RX ORDER — ONDANSETRON 2 MG/ML
8 INJECTION INTRAMUSCULAR; INTRAVENOUS ONCE
Status: COMPLETED | OUTPATIENT
Start: 2018-10-29 | End: 2018-10-29

## 2018-10-29 RX ORDER — ONDANSETRON 4 MG/1
4 TABLET, FILM COATED ORAL EVERY 12 HOURS PRN
Qty: 60 TABLET | Refills: 1 | Status: SHIPPED | OUTPATIENT
Start: 2018-10-29 | End: 2018-12-04

## 2018-10-29 RX ORDER — GLYCOPYRROLATE 0.2 MG/ML
INJECTION, SOLUTION INTRAMUSCULAR; INTRAVENOUS PRN
Status: DISCONTINUED | OUTPATIENT
Start: 2018-10-29 | End: 2018-10-29

## 2018-10-29 RX ORDER — LABETALOL HYDROCHLORIDE 5 MG/ML
5 INJECTION, SOLUTION INTRAVENOUS EVERY 10 MIN PRN
Status: DISCONTINUED | OUTPATIENT
Start: 2018-10-29 | End: 2018-10-29 | Stop reason: HOSPADM

## 2018-10-29 RX ORDER — FENTANYL CITRATE 50 UG/ML
INJECTION, SOLUTION INTRAMUSCULAR; INTRAVENOUS PRN
Status: DISCONTINUED | OUTPATIENT
Start: 2018-10-29 | End: 2018-10-29

## 2018-10-29 RX ORDER — CODEINE PHOSPHATE AND GUAIFENESIN 10; 100 MG/5ML; MG/5ML
1-2 SOLUTION ORAL EVERY 6 HOURS PRN
Qty: 120 ML | Refills: 0 | Status: SHIPPED | OUTPATIENT
Start: 2018-10-29 | End: 2018-11-01

## 2018-10-29 RX ORDER — ONDANSETRON 2 MG/ML
4 INJECTION INTRAMUSCULAR; INTRAVENOUS EVERY 30 MIN PRN
Status: DISCONTINUED | OUTPATIENT
Start: 2018-10-29 | End: 2018-10-29 | Stop reason: HOSPADM

## 2018-10-29 RX ORDER — FENTANYL CITRATE 50 UG/ML
25-50 INJECTION, SOLUTION INTRAMUSCULAR; INTRAVENOUS EVERY 5 MIN PRN
Status: DISCONTINUED | OUTPATIENT
Start: 2018-10-29 | End: 2018-10-29 | Stop reason: HOSPADM

## 2018-10-29 RX ORDER — DEXAMETHASONE SODIUM PHOSPHATE 10 MG/ML
INJECTION, SOLUTION INTRAMUSCULAR; INTRAVENOUS PRN
Status: DISCONTINUED | OUTPATIENT
Start: 2018-10-29 | End: 2018-10-29

## 2018-10-29 RX ADMIN — INFLUENZA A VIRUS A/MICHIGAN/45/2015 X-275 (H1N1) ANTIGEN (FORMALDEHYDE INACTIVATED), INFLUENZA A VIRUS A/SINGAPORE/INFIMH-16-0019/2016 IVR-186 (H3N2) ANTIGEN (FORMALDEHYDE INACTIVATED), INFLUENZA B VIRUS B/PHUKET/3073/2013 ANTIGEN (FORMALDEHYDE INACTIVATED), AND INFLUENZA B VIRUS B/MARYLAND/15/2016 BX-69A ANTIGEN (FORMALDEHYDE INACTIVATED) 0.5 ML: 15; 15; 15; 15 INJECTION, SUSPENSION INTRAMUSCULAR at 09:44

## 2018-10-29 RX ADMIN — GLYCOPYRROLATE 0.2 MG: 0.2 INJECTION, SOLUTION INTRAMUSCULAR; INTRAVENOUS at 14:53

## 2018-10-29 RX ADMIN — PROPOFOL 150 MCG/KG/MIN: 10 INJECTION, EMULSION INTRAVENOUS at 13:55

## 2018-10-29 RX ADMIN — SUGAMMADEX 110 MG: 100 INJECTION, SOLUTION INTRAVENOUS at 15:26

## 2018-10-29 RX ADMIN — ROCURONIUM BROMIDE 10 MG: 10 INJECTION INTRAVENOUS at 13:59

## 2018-10-29 RX ADMIN — FENTANYL CITRATE 100 MCG: 50 INJECTION, SOLUTION INTRAMUSCULAR; INTRAVENOUS at 13:52

## 2018-10-29 RX ADMIN — ONDANSETRON 4 MG: 2 INJECTION INTRAMUSCULAR; INTRAVENOUS at 14:34

## 2018-10-29 RX ADMIN — MIDAZOLAM 2 MG: 1 INJECTION INTRAMUSCULAR; INTRAVENOUS at 13:37

## 2018-10-29 RX ADMIN — SODIUM CHLORIDE 1000 ML: 9 INJECTION, SOLUTION INTRAVENOUS at 05:53

## 2018-10-29 RX ADMIN — ROCURONIUM BROMIDE 20 MG: 10 INJECTION INTRAVENOUS at 14:42

## 2018-10-29 RX ADMIN — ROCURONIUM BROMIDE 40 MG: 10 INJECTION INTRAVENOUS at 13:56

## 2018-10-29 RX ADMIN — LIDOCAINE HYDROCHLORIDE 80 MG: 20 INJECTION, SOLUTION INFILTRATION; PERINEURAL at 13:55

## 2018-10-29 RX ADMIN — PROPOFOL 60 MG: 10 INJECTION, EMULSION INTRAVENOUS at 13:55

## 2018-10-29 RX ADMIN — ONDANSETRON 8 MG: 2 INJECTION INTRAMUSCULAR; INTRAVENOUS at 05:43

## 2018-10-29 RX ADMIN — LIDOCAINE HYDROCHLORIDE 20 MG: 20 INJECTION, SOLUTION INFILTRATION; PERINEURAL at 14:48

## 2018-10-29 RX ADMIN — DEXAMETHASONE SODIUM PHOSPHATE 6 MG: 10 INJECTION, SOLUTION INTRAMUSCULAR; INTRAVENOUS at 14:34

## 2018-10-29 RX ADMIN — GLYCOPYRROLATE 0.2 MG: 0.2 INJECTION, SOLUTION INTRAMUSCULAR; INTRAVENOUS at 14:29

## 2018-10-29 RX ADMIN — SODIUM CHLORIDE, POTASSIUM CHLORIDE, SODIUM LACTATE AND CALCIUM CHLORIDE: 600; 310; 30; 20 INJECTION, SOLUTION INTRAVENOUS at 13:37

## 2018-10-29 ASSESSMENT — PAIN SCALES - GENERAL: PAINLEVEL: NO PAIN (0)

## 2018-10-29 NOTE — ANESTHESIA POSTPROCEDURE EVALUATION
Anesthesia POST Procedure Evaluation    Patient: Kecia Blue   MRN:     [id number] Gender:   female   Age:    57 year old :      1960        Preoperative Diagnosis: History Lung Transplant    Procedure(s):  Flexible Bronchoscopy, Balloon Dilation, Stent Revision, Airway Examination And Therapeutic Suctioning, Cyro Tumor Debulking  COMBINED ESOPHAGOSCOPY, GASTROSCOPY, DUODENOSCOPY (EGD) with biopsies and polypectomy   Postop Comments: No value filed.       Anesthesia Type:  Not documented    Reportable Event: NO     PAIN: Uncomplicated   Sign Out status: Comfortable, Well controlled pain     PONV: No PONV   Sign Out status:  No Nausea or Vomiting     Neuro/Psych: Uneventful perioperative course   Sign Out Status: Preoperative baseline; Age appropriate mentation     Airway/Resp.: Uneventful perioperative course   Sign Out Status: Non labored breathing, age appropriate RR; Resp. Status within EXPECTED Parameters     CV: Uneventful perioperative course   Sign Out status: Appropriate BP and perfusion indices; Appropriate HR/Rhythm     Disposition:   Sign Out in:  PACU  Disposition:  Phase II; Home  Recovery Course: Uneventful  Follow-Up: Not required           Last Anesthesia Record Vitals:  CRNA VITALS  10/29/2018 1511 - 10/29/2018 1557      10/29/2018             NIBP: 122/75    Pulse: 96          Last PACU/Preop Vitals:  Vitals:    10/29/18 1100   BP: 130/79   Pulse: 84   Resp: 18   Temp: 36.5  C (97.7  F)   SpO2: 95%         Electronically Signed By: Scarlett Gamez MD, 2018, 3:57 PM

## 2018-10-29 NOTE — OR NURSING
Dr. Lin called regarding CXR done and called back at 1710 to state pt could be discharged to home.

## 2018-10-29 NOTE — MR AVS SNAPSHOT
After Visit Summary   10/29/2018    Kecia Blue    MRN: [id number]           Patient Information     Date Of Birth          1960        Visit Information        Provider Department      10/29/2018 7:00 AM Dean Riley MD [facility] Solid Organ Transplant        Today's Diagnoses     Intractable vomiting with nausea, unspecified vomiting type    -  1    Cough        Lung transplant recipient (H)        Encounter for long-term (current) use of high-risk medication        Need for prophylactic vaccination and inoculation against influenza          Care Instructions    Patient Instructions  1. Zofran scheduled two times a day, call  Magaly with an update  2. Discontinue Reglan  3. Robitussin  AC  four times a day as needed  4. Flu shot    Next transplant clinic appointment: 4 weeks  with CXR, labs and PFTs  Next lab draw:  Weekly in [employer] printed at time of check out              ~~~~~~~~~~~~~~~~~~~~~~~~~    Thoracic Transplant Office phone [phone], fax [phone]    Office Hours 8:30 - 5:00     For after-hours urgent issues, please dial [phone], and ask to speak with the Thoracic Transplant Coordinator  On-Call, pager [phone].  --------------------  To expedite your medication refill(s), please contact your pharmacy and have them fax a refill request to: [phone]  .   *Please allow 3 business days for routine medication refills.  *Please allow 5 business days for controlled substance medication refills.    **For Diabetic medications and supplies refill(s), please contact your pharmacy and have them  Contact your Endocrine team.  --------------------  For scheduling appointments call [facility] :  [phone]. For lab appointments call [phone] or [facility].  --------------------  Please Note: If you are active on [employer], all future test results will be sent by [employer] message only, and will no longer be called to patient. You may also receive  communication directly from your physician.          Follow-ups after your visit        Your next 10 appointments already scheduled     Oct 29, 2018   Procedure with Wilber Lin MD   [facility], [place], Same Day Surgery (--)    [address]  [facility] [address] [phone]            Nov 19, 2018  8:00 AM CST   XR CHEST 2 VIEWS with UCXR1   [facility] [place] [facility] ([facility])    [address] [place] [id number] [phone]           How do I prepare for my exam? (Food and drink instructions) No Food and Drink Restrictions.  How do I prepare for my exam? (Other instructions) You do not need to do anything special for this exam.  What should I wear: Wear comfortable clothes.  How long does the exam take: Most scans take less than 5 minutes.  What should I bring: Bring a list of your medicines, including vitamins, minerals and over-the-counter drugs. It is safest to leave personal items at home.  Do I need a :  No  is needed.  What do I need to tell my doctor: Tell your doctor if there s any chance you are pregnant.  What should I do after the exam: No restrictions, You may resume normal activities.  What is this test: An image of a specific body part shown in shades of black and white.  Who should I call with questions: If you have any questions, please call the Imaging Department where you will have your exam. Directions, parking instructions, and other information is available on our website, [email or web address].            Nov 19, 2018  8:15 AM CST   Lab with  LAB    Health Lab ([facility])    [address] [place] [id number] [phone]            Nov 19, 2018  8:30 AM CST   PFT VISIT with  PFL C    Health Pulmonary Function Testing ([facility])    [address] [place] [id number] [phone]            Nov 19, 2018   9:00 AM CST   (Arrive by 8:45 AM)   Return Lung Transplant with Dean Riley MD   [facility] Solid Organ Transplant [facility]    [address]  [address] [facility] [address] [phone]            Dec 13, 2018  8:40 AM CST   (Arrive by 8:25 AM)   New Patient Visit with Daron Cruz PA-C   [facility]astro[facility]ology [facility]    [address]  [address] [facility] [id number] [phone]              Future tests that were ordered for you today     Open Future Orders        Priority Expected Expires Ordered    Basic metabolic panel Routine 11/19/2018 12/10/2018 10/29/2018    Magnesium Routine 11/19/2018 12/10/2018 10/29/2018    CBC with platelets Routine 11/19/2018 12/10/2018 10/29/2018    CMV DNA quantification Routine 11/19/2018 12/10/2018 10/29/2018    X-ray Chest 2 vws* Routine 11/19/2018 12/10/2018 10/29/2018    Spirometry, Breathing Capacity Routine 11/19/2018 12/10/2018 10/29/2018    INR Routine 11/19/2018 12/10/2018 10/29/2018    Tacrolimus level Routine 11/19/2018 12/10/2018 10/29/2018    PRA Donor Specific Antibody Routine 11/19/2018 12/10/2018 10/29/2018            Who to contact     If you have questions or need follow up information about today's clinic visit or your schedule please contact [facility] SOLID ORGAN TRANSPLANT directly at [phone].  Normal or non-critical lab and imaging results will be communicated to you by MyChart, letter or phone within 4 business days after the clinic has received the results. If you do not hear from us within 7 days, please contact the clinic through MyChart or phone. If you have a critical or abnormal lab result, we will notify you by phone as soon as possible.  Submit refill requests through [employer] or call your pharmacy and they will forward the refill request to us. Please allow 3 business days for your refill to be completed.          Additional Information  "About Your Visit        [facility] Information     [employer] gives you secure access to your electronic health record. If you see a primary care provider, you can also send messages to your care team and make appointments. If you have questions, please call your primary care clinic.  If you do not have a primary care provider, please call [phone] and they will assist you.        Care EveryWhere ID     This is your Care EveryWhere ID. This could be used by other organizations to access your [place] medical records  [id number]        Your Vitals Were     Pulse Temperature Respirations Height Last Period Pulse Oximetry    99 98.1  F (36.7  C) (Oral) 18 1.626 m (5' 4\") 06/07/2014 (Exact Date) 97%    BMI (Body Mass Index)                   19.16 kg/m2            Blood Pressure from Last 3 Encounters:   10/29/18 143/82   10/29/18 123/79   10/11/18 114/73    Weight from Last 3 Encounters:   10/29/18 49.9 kg (110 lb)   10/29/18 50.6 kg (111 lb 9.6 oz)   10/11/18 53.7 kg (118 lb 6.2 oz)                 Today's Medication Changes          These changes are accurate as of 10/29/18 10:05 AM.  If you have any questions, ask your nurse or doctor.               Start taking these medicines.        Dose/Directions    guaiFENesin-codeine 100-10 MG/5ML Soln solution   Commonly known as:  ROBITUSSIN AC   Used for:  Cough   Started by:  Dean Riley MD        Dose:  1-2 tsp.   Take 5-10 mLs by mouth every 6 hours as needed for cough   Quantity:  120 mL   Refills:  0       ondansetron 4 MG tablet   Commonly known as:  ZOFRAN   Used for:  Intractable vomiting with nausea, unspecified vomiting type   Started by:  Dean Riley MD        Dose:  4 mg   Take 1 tablet (4 mg) by mouth every 12 hours as needed for nausea   Quantity:  60 tablet   Refills:  1         These medicines have changed or have updated prescriptions.        Dose/Directions    * predniSONE 2.5 MG tablet   Commonly known as:  DELTASONE   This may have changed:    - " when to take this  - additional instructions   Used for:  Lung replaced by transplant (H)        Dose:  2.5 mg   Take 1 tablet (2.5 mg) by mouth daily 6/22/18             7.5                    7.5 7/20/18             7.5                    5 8/24/18             5                       5 9/21/18             5                       2.5   Quantity:  90 tablet   Refills:  1       * predniSONE 5 MG tablet   Commonly known as:  DELTASONE   This may have changed:    - how much to take  - how to take this  - when to take this  - additional instructions   Used for:  S/P lung transplant (H)        Follow taper card   Quantity:  180 tablet   Refills:  3       * Notice:  This list has 2 medication(s) that are the same as other medications prescribed for you. Read the directions carefully, and ask your doctor or other care provider to review them with you.      Stop taking these medicines if you haven't already. Please contact your care team if you have questions.     levalbuterol 45 MCG/ACT Inhaler   Commonly known as:  XOPENEX HFA   Stopped by:  Dean Riley MD                Where to get your medicines      These medications were sent to [address] [address]    Hours:  TRANSPLANT PHONE NUMBER [phone] Phone:  [phone]     ondansetron 4 MG tablet         Some of these will need a paper prescription and others can be bought over the counter.  Ask your nurse if you have questions.     Bring a paper prescription for each of these medications     guaiFENesin-codeine 100-10 MG/5ML Soln solution                Primary Care Provider Office Phone # Fax #    Rosy Vu [phone]       [facility]  [address]  [facility] [address]        Equal Access to Services     ALBAN VALENCIA AH: Sahara Lucas, yasmin sierra, morro rowan  gianlucakelli dorimarilyn laKarenaaeloina ah. So [facility] [phone].    ATENCIÓN: Si margarito silveira, tiene a taylor disposición servicios gratuitos de asistencia lingüística. Zachary al [phone].    We comply with applicable federal civil rights laws and [place] laws. We do not discriminate on the basis of race, color, national origin, age, disability, sex, sexual orientation, or gender identity.            Thank you!     Thank you for choosing [facility] SOLID ORGAN TRANSPLANT  for your care. Our goal is always to provide you with excellent care. Hearing back from our patients is one way we can continue to improve our services. Please take a few minutes to complete the written survey that you may receive in the mail after your visit with us. Thank you!             Your Updated Medication List - Protect others around you: Learn how to safely use, store and throw away your medicines at www.disposemymeds.org.          This list is accurate as of 10/29/18 10:05 AM.  Always use your most recent med list.                   Brand Name Dispense Instructions for use Diagnosis    ACETAMINOPHEN PO      Take 1,000 mg by mouth every 6 hours as needed for pain        albuterol 108 (90 Base) MCG/ACT inhaler    PROAIR HFA/PROVENTIL HFA/VENTOLIN HFA    1 Inhaler    Inhale 2 puffs into the lungs every 6 hours as needed for shortness of breath / dyspnea or wheezing    SOB (shortness of breath), Wheezing       calcium carbonate 600 mg-vitamin D 400 units 600-400 MG-UNIT per tablet    CALTRATE     Take 1 tablet by mouth daily    S/P lung transplant (H), ILD (interstitial lung disease) (H), Lung transplant recipient (H), Encounter for long-term (current) use of high-risk medication, Anemia, unspecified type, Cytomegalovirus (CMV) viremia (H)       dronabinol 5 MG capsule    MARINOL    60 capsule    Take 1 capsule (5 mg) by mouth 2 times daily    Delayed gastric emptying       guaiFENesin-codeine 100-10 MG/5ML Soln solution    ROBITUSSIN AC    120 mL    Take 5-10  mLs by mouth every 6 hours as needed for cough    Cough       magnesium oxide 400 MG tablet    MAG-OX    90 tablet    Take 1 tablet (400 mg) by mouth daily    Hypomagnesemia       metoclopramide 5 MG tablet    REGLAN    60 tablet    Take 1 tablet (5 mg) by mouth 2 times daily    Delayed gastric emptying       metoprolol tartrate 25 MG tablet    LOPRESSOR    60 tablet    Take 1 tablet (25 mg) by mouth 2 times daily    Paroxysmal atrial fibrillation (H)       multivitamin, therapeutic with minerals Tabs tablet     30 each    Take 1 tablet by mouth daily    Lung transplant recipient (H)       mycophenolate 250 MG capsule    GENERIC EQUIVALENT    360 capsule    Take 6 capsules (1,500 mg) by mouth 2 times daily    S/P lung transplant (H)       ondansetron 4 MG tablet    ZOFRAN    60 tablet    Take 1 tablet (4 mg) by mouth every 12 hours as needed for nausea    Intractable vomiting with nausea, unspecified vomiting type       order for DME     1 Device    Equipment being ordered: Nebulizer    Status post lung transplantation (H)       order for DME     1 Device    Equipment being ordered: Nebulizer    Lung transplant recipient (H)       pantoprazole 40 MG EC tablet    PROTONIX    60 tablet    Take 1 tablet (40 mg) by mouth 2 times daily    Gastroesophageal reflux disease without esophagitis, ILD (interstitial lung disease) (H), Lung transplant recipient (H)       pentoxifylline 400 MG CR tablet    TRENtal    60 tablet    Take 1 tablet (400 mg) by mouth 2 times daily    Lung transplant recipient (H)       * predniSONE 2.5 MG tablet    DELTASONE    90 tablet    Take 1 tablet (2.5 mg) by mouth daily 6/22/18             7.5                    7.5 7/20/18             7.5                    5 8/24/18             5                       5 9/21/18             5                       2.5    Lung replaced by transplant (H)       * predniSONE 5 MG tablet    DELTASONE    180 tablet    Follow taper card    S/P lung transplant (H)        sulfamethoxazole-trimethoprim 800-160 MG per tablet    BACTRIM DS/SEPTRA DS    30 tablet    Take 1 tablet by mouth daily X one month.    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    S/P lung transplant (H)       * tacrolimus 1 MG capsule    GENERIC EQUIVALENT    240 capsule    Take 4 capsules (4 mg) by mouth 2 times daily Take 4 mg in the AM and 4 mg in the PM along with one 0.5mg for total dose 4.5mg am and 4.5mg pm.    S/P lung transplant (H), Other constipation       VALCYTE 450 MG tablet   Generic drug:  valGANciclovir     30 tablet    Take 450 mg by mouth daily    Cytomegalovirus (CMV) viremia (H), ILD (interstitial lung disease) (H), Lung transplant recipient (H), Encounter for long-term (current) use of high-risk medication, Anemia, unspecified type, S/P lung transplant (H)       * Notice:  This list has 4 medication(s) that are the same as other medications prescribed for you. Read the directions carefully, and ask your doctor or other care provider to review them with you.

## 2018-10-29 NOTE — IP AVS SNAPSHOT
Post Anesthesia Care Unit [address] [address]    Phone:  [phone]                                       After Visit Summary   10/29/2018    Kecia Blue    MRN: [id number]           After Visit Summary Signature Page     I have received my discharge instructions, and my questions have been answered. I have discussed any challenges I see with this plan with the nurse or doctor.    ..........................................................................................................................................  Patient/Patient Representative Signature      ..........................................................................................................................................  Patient Representative Print Name and Relationship to Patient    ..................................................               ................................................  Date                                   Time    ..........................................................................................................................................  Reviewed by Signature/Title    ...................................................              ..............................................  Date                                               Time          22EPIC Rev 08/18

## 2018-10-29 NOTE — PATIENT INSTRUCTIONS
Patient Instructions  1. Zofran scheduled two times a day, call  [facility] with an update  2. Discontinue Reglan  3. Robitussin  AC  four times a day as needed  4. Flu shot    Next transplant clinic appointment: 4 weeks  with CXR, labs and PFTs  Next lab draw:  Weekly in [employer] printed at time of check out              ~~~~~~~~~~~~~~~~~~~~~~~~~    Thoracic Transplant Office phone [phone], fax [phone]    Office Hours 8:30 - 5:00     For after-hours urgent issues, please dial [phone], and ask to speak with the Thoracic Transplant Coordinator  On-Call, pager [phone].  --------------------  To expedite your medication refill(s), please contact your pharmacy and have them fax a refill request to: [phone]  .   *Please allow 3 business days for routine medication refills.  *Please allow 5 business days for controlled substance medication refills.    **For Diabetic medications and supplies refill(s), please contact your pharmacy and have them  Contact your Endocrine team.  --------------------  For scheduling appointments call [facility] :  [phone]. For lab appointments call [phone] or [facility].  --------------------  Please Note: If you are active on [employer], all future test results will be sent by [employer] message only, and will no longer be called to patient. You may also receive communication directly from your physician.

## 2018-10-29 NOTE — ED TRIAGE NOTES
Pt c/o N/V/D. She feels that she is only throwing up phlegm. She is supposed to have a bronch today around noon with possible stenting. Pt stating that she has had a decreased appetite recently since she has difficulty with her bronch, past few months.

## 2018-10-29 NOTE — ANESTHESIA PREPROCEDURE EVALUATION
Anesthesia Evaluation     . Pt has had prior anesthetic. Type: General    History of anesthetic complications   - PONV        ROS/MED HX    ENT/Pulmonary: Comment: ILD s/p B lung txp    (+), . Other pulmonary disease Interstitial Lung Disease s/p Lung transplant 3/2018.    Neurologic:  - neg neurologic ROS     Cardiovascular: Comment: Hx of ECMO de cannulated after tx    Recent Echo now with normal EF        (+) hypertension----. Taking blood thinners Pt has not received instructions: . . . :. . pulmonary hypertension (2/2 ILD (now s/p B lung txp)), Previous cardiac testing Echodate:results:date: results:ECG reviewed date: results: date: results:          METS/Exercise Tolerance:  4 - Raking leaves, gardening   Hematologic:     (+) History of blood clots (IJ blood clot on coumadin, transitioned to lovenox for procedure today) pt is anticoagulated, History of Transfusion no previous transfusion reaction -      Musculoskeletal:  - neg musculoskeletal ROS       GI/Hepatic:     (+) GERD       Renal/Genitourinary:     (+) chronic renal disease, type: CRI, Pt does not require dialysis, Pt has no history of transplant,       Endo:     (+) Chronic steroid usage for Post Transplant Immunosuppression .      Psychiatric:  - neg psychiatric ROS       Infectious Disease:         Malignancy:      - no malignancy   Other: Comment: Rheumatoid arthritis; Arron's   (+) No chance of pregnancy C-spine cleared: N/A, no H/O Chronic Pain,no other significant disability                    Physical Exam  Normal systems: cardiovascular and pulmonary    Airway   Mallampati: II  TM distance: >3 FB  Neck ROM: full    Dental     Cardiovascular   Rhythm and rate: regular and normal      Pulmonary    breath sounds clear to auscultation(-) no wheezes and no rales                    Anesthesia Plan      History & Physical Review  History and physical reviewed and following examination; no interval change.    ASA Status:  3 .    NPO Status:  > 8  hours    Plan for General and ETT with Intravenous induction. Maintenance will be TIVA.    PONV prophylaxis:  Ondansetron (or other 5HT-3) and Dexamethasone or Solumedrol       Postoperative Care  Postoperative pain management:  IV analgesics, Multi-modal analgesia and Oral pain medications.      Consents  Anesthetic plan, risks, benefits and alternatives discussed with:  Patient..          57yF. Hx dermatomyositis, seronegative rheumatoid arthritis, interstitial lung disease and pulmonary hypertension who is status post bilateral sequential lung transplant in March 2018. She has bronchial stenosis and presents for repeat bronch w/ dilation and stent. TIVA                .    RAAD MOYA AN PHYSICAL EXAM    Assessment:   ASA SCORE: 3

## 2018-10-29 NOTE — LETTER
10/29/2018       RE: Kecia Blue  [address] [place] [address]     Dear Colleague,    Thank you for referring your patient, Kecia Blue, to the [facility] SOLID ORGAN TRANSPLANT at [facility]. Please see a copy of my visit note below.    [facility]  Pulmonary Medicine/Lung Transplant  October 29, 2018         Today's visit note:     PATIENT PROFILE:  Kecia Blue is a 57-year-old female with history of dermatomyositis, seronegative rheumatoid arthritis, interstitial lung disease and pulmonary hypertension who is status post bilateral sequential lung transplant in March 2018.  Prior to transplant she required emergent [facility] ECMO for acute right heart failure and acute on chronic hypoxic respiratory failure.  Her immediate post transplant course was uncomplicated except for recurrent right-sided pleural effusions.  More recently she has had significant narrowing of her right bronchial anastomosis requiring multiple dilations and stent placement. She has also been experiencing recurrent episodes of vomiting with significant weight loss.    INTERVAL HISTORY: The patient was last seen in our clinic approximately 3 weeks ago and comes in today for a follow-up.  She is scheduled to undergo a bronchial dilation with stent placement in her right mainstem later today in the OR.  She reports that she has been feeling poorly in regards to frequent coughing that started about a week after her last bronchoscopy when her right mainstem foot was removed and she underwent balloon dilation.  She reports getting episodes of coughing that will not stop until she gags and then vomits.  She has been vomiting at least 2-3 times a day.  These episodes have been going on for several months but have certainly progressed in the last 2 weeks.  Last night she had another episode like that when she could not stop coughing in had vomiting.  She went to the  "emergency room for that.  All her labs and other workup was stable so she was discharged to be seen in clinic this morning.  She reports that she is short of breath with any exertion and is limited in her ADLs including showering.  She has been using supplemental oxygen for the last 2 days just for comfort however her saturations stay above 92% all the time.  She is usually not short of breath at rest.  She has frequent cough which is dry although it sounds wet but is not able to bring any sputum up.  She also reports occasional wheezing.  She denies any hemoptysis.     REVIEW OF SYSTEMS:     1.  She denies any recent fevers, chills or night sweats.  Her appetite is very poor.  She denies early satiety or bad taste.  She just does not feel like eating.  2.  She denies any nasal congestion.  She reports some blurry vision in her right eye.  3.  She denies any chest pain palpitations.  She does have some lightheadedness.  4.  She reports active nausea with episodes of vomiting.  She also had one episode of diarrhea today but otherwise has normal bowel movements.  She denies any abdominal cramping.  5.  She denies any lower extremity is edema.  There are no bruises or rashes.  6.  She has mild upper extremity tremors.  She reports that her sleep is good.      Rest of the review of systems is negative except as noted above.      PULMONARY FUNCTION TESTS: Pulmonary function test was deferred today.  She has known very severe restriction on her PFTs from before.     IMAGING: Low lung volumes, bilaterally clear lung fields.  No effusion or infiltrate.     PHYSICAL EXAMINATION:   /79 (BP Location: Left arm, Patient Position: Sitting, Cuff Size: Adult Small)  Pulse 99  Temp 98.1  F (36.7  C) (Oral)  Resp 18  Ht 1.626 m (5' 4\")  Wt 50.6 kg (111 lb 9.6 oz)  LMP 06/07/2014 (Exact Date)  SpO2 97%  BMI 19.16 kg/m2  GENERAL: Pleasant female sitting comfortably, speaking in full sentences without any discomfort.  " Occasional wheezing heard while speaking.  Appears pale.  EYES: Significant pallor.  No icterus.  HEENT: Moist mucous membranes, no cer normal intensity breath sounds bilaterally without any added sounds particularly wheezing.  CARDIOVASCULAR: S1-S2 normal with no murmurs rubs or gallops.  Regular rate and rhythm.  Tachycardia noted.  ABDOMEN:  Soft, soft nontender, nondistended, normoactive bowel sounds.  MUSCULOSKELETAL: No lower extremities edema.  No upper extremity tremors.  NEUROLOGIC: Grossly intact  PSYCHIATRIC: Normal affect  Appears pale throughout, no rashes or bruises.     ASSESSMENT AND PLAN: The patient is a 57-year-old female who is 7 months status post bilateral sequential lung transplant for interstitial lung disease related to rheumatoid arthritis.  Her posttransplant course has been complicated by right mainstem anastomotic stenosis requiring multiple dilation and stent placement.  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    1.  Bilateral lung transplant, complicated by right airway stenosis: Patient is scheduled for bronchoscopy with bronchial dilation and possible stent placement today.  She will continue the 3 drug immunosuppression including tacrolimus with a goal of 10-15 ng/mL, mycophenolate 1500 mg twice a day and prednisone 7.5 mg total daily.  Her tacrolimus level from today is pending, we will adjust the dose as indicated according to protocol.  Her most recent PRA is negative for any donor specific antibodies.    2.  Infectious disease: Patient remains on Bactrim for PCP prophylaxis.  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  She also had an episode of CMV viremia, is currently being treated with prophylactic doses of Valcyte which will be continued.  She had a high risk donor and will need labs in 12 months.    3.  Recurrent vomiting: This is likely precipitated by episodes of cough  resulting in gagging.  She has an upcoming appointment with GI in December.  Her appetite is also significantly low, she has lost some weight.  At this time it does not appear that this is being driven by GI issue, it is very likely that her vomiting is mechanical due to gagging from cough.  Since she is scheduled to undergo a bronchoscopy in the OR today, we will contact our GI colleagues to consider an EGD at the same time to evaluate for gastric outflow obstruction.  We prescribed Zofran to be taken scheduled twice a day over the next 2 weeks in order to prevent the vomiting.  Also prescribed Robitussin AC to suppress the cough.    4.  Hypertension: This appears to be well treated with metoprolol which will be continued.    5.  Hypomagnesemia: Patient to continue magnesium replacement.    6.  Dermatomyositis with ray nods phenomenon: We will continue to follow with rheumatology.    7.  Provoked left upper extremity DVT and right IJ clot: Anticoagulation has been stopped with completion of 6 months of treatment.    8.  CKD stage III: Kidney function appears to be at baseline.  We will continue to monitor.    9.  Anemia: Likely a combination of iron deficiency due to poor oral intake and anemia of chronic disease.  We will continue to follow.    Patient will return in clinic in 4 weeks with repeat labs, PFTs and chest imaging.         Data:     I reviewed all resulted lab tests and imaging studies.    Results for orders placed or performed in visit on 10/22/18   Tacrolimus level   Result Value Ref Range    Tacrolimus Last Dose 2230 10/21/18     Tacrolimus Level 14.8 5.0 - 15.0 ug/L              Medications:     No current facility-administered medications for this visit.      No current outpatient prescriptions on file.            Past Medical and Surgical History:     Past Medical History:   Diagnosis Date     Antisynthetase syndrome (H) 2014     Chronic cough      Chronic infection     recent C diff  8/18      Dehydration 8/1/2018     Dermatomyositis (H)      Dysplasia of cervix, low grade (ESTRADA 1)      ILD (interstitial lung disease) (H)      Osteopenia      PONV (postoperative nausea and vomiting)      Pulmonary hypertension (H)      Raynaud's disease      Seronegative rheumatoid arthritis (H)      Past Surgical History:   Procedure Laterality Date     BRONCHOSCOPY (RIGID OR FLEXIBLE), DIAGNOSTIC N/A 4/10/2018    Procedure: COMBINED BRONCHOSCOPY (RIGID OR FLEXIBLE), LAVAGE;;  Surgeon: Mariposa Donohue MD;  Location: [facility]     BRONCHOSCOPY (RIGID OR FLEXIBLE), DILATE BRONCHUS / TRACHEA N/A 10/11/2018    Procedure: BRONCHOSCOPY (RIGID OR FLEXIBLE), DILATE BRONCHUS / TRACHEA;  Flexible And Rigid Bronchoscopy and Dilation;  Surgeon: Wilber Lin MD;  Location: [facility]     BRONCHOSCOPY FLEXIBLE N/A 3/13/2018    Procedure: BRONCHOSCOPY FLEXIBLE;  Flexible Bronchoscopy ;  Surgeon: Gissell Sanchez MD;  Location: [facility]     BRONCHOSCOPY FLEXIBLE N/A 5/9/2018    Procedure: BRONCHOSCOPY FLEXIBLE;;  Surgeon: Wilber Lin MD;  Location: [facility]     BRONCHOSCOPY FLEXIBLE AND RIGID N/A 9/10/2018    Procedure: BRONCHOSCOPY FLEXIBLE AND RIGID;  Flexible and Rigid Bronchoscopy with Balloon Dilation, tissue debulking with cryo, and Right mainstem bronchus stent placement;  Surgeon: Wilber Lin MD;  Location: [facility]     BRONCHOSCOPY RIGID N/A 6/6/2018    Procedure: BRONCHOSCOPY RIGID;;  Surgeon: Lopez Macias MD;  Location: [facility]     BRONCHOSCOPY, DILATE BRONCHUS, STENT BRONCHUS, COMBINED N/A 6/11/2018    Procedure: COMBINED BRONCHOSCOPY, DILATE BRONCHUS, STENT BRONCHUS;  Flexible Bronchoscopy, Balloon Dilation, Bronchial Washings;  Surgeon: Wilber Lin MD;  Location: [facility]     BRONCHOSCOPY, DILATE BRONCHUS, STENT BRONCHUS, COMBINED Right 7/10/2018    Procedure: COMBINED BRONCHOSCOPY, DILATE BRONCHUS, STENT BRONCHUS;  Flexible Bronchoscopy, Balloon Dilation, Bronchial Washings  ;   Surgeon: Wilber Lin MD;  Location: [facility]     BRONCHOSCOPY, DILATE BRONCHUS, STENT BRONCHUS, COMBINED N/A 8/2/2018    Procedure: COMBINED BRONCHOSCOPY, DILATE BRONCHUS, STENT BRONCHUS;  Flexible Bronchoscopy, Bronchial Washings, Balloon Dilation;  Surgeon: Wilber Lin MD;  [facility]: [facility]     BRONCHOSCOPY, DILATE BRONCHUS, STENT BRONCHUS, COMBINED N/A 8/20/2018    Procedure: COMBINED BRONCHOSCOPY, DILATE BRONCHUS, STENT BRONCHUS;  Flexible Bronchoscopy, Balloon Dilation;  Surgeon: Wilber Lin MD;  Location: [facility]     ENT SURGERY      tonsillectomy as a child     INSERT EXTRACORPORAL MEMBRANE OXYGENATOR ADULT (OUTSIDE OR) N/A 2/27/2018    Procedure: INSERT EXTRACORPORAL MEMBRANE OXYGENATOR ADULT (OUTSIDE OR);  INSERT EXTRACORPORAL MEMBRANE OXYGENATOR ADULT (OUTSIDE OR) ;  Surgeon: Toby Hernandez MD;  Location: [facility]     no prior surgery       REMOVE EXTRACORPORAL MEMBRANE OXYGENATOR ADULT N/A 3/3/2018    Procedure: REMOVE EXTRACORPORAL MEMBRANE OXYGENATOR ADULT;  Removal of Right Femoral Venous and Right Axillary Arterial Extracorporeal Membrane Oxygenator;  Surgeon: Toby Hernandez MD;  Location: [facility]     TRANSPLANT LUNG RECIPIENT SINGLE X2 Bilateral 3/1/2018    Procedure: TRANSPLANT LUNG RECIPIENT SINGLE X2;  Median Sternotomy, Extracorporeal Membrane Oxygenator, bilateral sequential lung transplant;  Surgeon: Toby Hernandez MD;  Location: [facility]           Family History:     Family History   Problem Relation Age of Onset     Hypertension Mother      Arthritis Mother      Pancreatic Cancer Father      Diabetes Father             Social History:     Social History     Social History     Marital status:      Spouse name: N/A     Number of children: N/A     Years of education: N/A     Occupational History     Not on file.     Social History Main Topics     Smoking status: Never Smoker     Smokeless tobacco: Never Used     Alcohol use No      Drug use: No     Sexual activity: Not on file     Other Topics Concern     Parent/Sibling W/ Cabg, Mi Or Angioplasty Before 65f 55m? No     Social History Narrative       Dean Riley MD   of Medicine  Pulmonary, Critical Care and Sleep Medicine  [facility]  Pager: [phone]

## 2018-10-29 NOTE — NURSING NOTE
"Injectable Influenza Immunization Documentation    1.  Has the patient received the information for the injectable influenza vaccine? YES     2. Is the patient 6 months of age or older? YES     3. Does the patient have any of the following contraindications?         Severe allergy to eggs? No     Severe allergic reaction to previous influenza vaccines? No   Severe allergy to latex? No       History of Guillain-Buckhead syndrome? No     Currently have a temperature greater than 100.4F? No        4.  Severely egg allergic patients should have flu vaccine eligibility assessed by an MD, RN, or pharmacist, and those who received flu vaccine should be observed for 15 min by an MD, RN, Pharmacist, Medical Technician, or member of clinic staff.\": YES    5. Latex-allergic patients should be given latex-free influenza vaccine Yes. Please reference the Vaccine latex table to determine if your clinic s product is latex-containing.       Vaccination given by Gianna Rogers [facility]  10/29/2018 10:23 AM            "

## 2018-10-29 NOTE — PROCEDURES
INTERVENTIONAL PULMONOLOGY       Procedure(s):    A flexible and rigid bronchoscopy   Airway exam  Airway stent placement (2 stents)  Airway stent removal (1 stents)  Balloon bronchoplasty with stent placement (1 sites)   Bronchial washing (1 sites)  Fluoroscopic guidance   Tissue debulking with cryoprobe recanalization     Indication:  BSLT with recurrent stenosis and granulation tissue R anastomosis, previous migrated stent.    Attending of Record:  Alana Lin MD    Interventional Pulmonary Fellow   Preet Patel MD     Trainees Present:   None    Medications:    General Anesthesia - See anesthesia flowsheet for details    Sedation Time:   Per Anesthesia Care Provider    Time Out:  Performed    The patient's medical record has been reviewed.  The indication for the procedure was reviewed.  The necessary history and physical examination was performed and reviewed.  The risks, benefits and alternatives of the procedure were discussed with the the patient in detail and she had the opportunity to ask questions.  I discussed in particular the potential complications including risks of minor or life-threatening bleeding and/or infection, respiratory failure, vocal cord trauma / paralysis, pneumothorax, and discomfort. Sedation risks were also discussed including abnormal heart rhythms, low blood pressure, and respiratory failure. All questions were answered to the best of my ability.  Verbal and written informed consent was obtained.  The proposed procedure and the patient's identification were verified prior to the procedure by the physician and the surgical team.    The patient was assessed for the adequacy for the procedure and to receive medications.   Mental Status:  Alert and oriented x 3  Airway examination:  Class III (Visualization of only the base of uvula)  Pulmonary:  Clear to ausculation bilaterally  CV:  RRR, no murmurs or gallops  ASA Grade:  (III)  Severe systemic disease    After clinical  evaluation and reviewing the indication, risks, alternatives and benefits of the procedure the patient was deemed to be in satisfactory condition to undergo the procedure.           A Tuberculosis risk assessment was performed:  The patient has no known RISK of Tuberculosis    The procedure was performed in a negative airflow room: The patient could not be moved to a negative airflow room because of needed OR for the procedure    Maneuvers / Procedure:      A Flexible and 8.5mm Rigid bronchoscope bronchoscope was used for the procedure. The rigid bronchoscope was inserted into the mouth. Uvula, epiglottis and vocal cords were seen. The scope was advanced turning the bevel to 90degress while passing through the cords and into the trachea.     Airway dilation: Airway dilation#1: The 8-9-10mm Elation Ballooon was used to dilate the Right mainstem  airway. Each dilation was held for 60sec and repeated 3 times    Airway Examination: A complete airway examination was performed from the distal trachea to the subsegmental level in each lobe of both lungs.  Pertinent findings include stenosed R anastomosis with occluding granulation tissue.         Bronchial washing: Right mainstem was washed and sent for analysis.     Stent placement: Stent#1:iCAST (9x38mm) balloon/stent was inflated to [id number] then deployed in the R main bronchus using direct visualization. After deployment, the stent was too distal. and Stent#2:Aero ([id number]) stent deployed in the R main bronchus using fluoroscopic/direct guidance. After deployment, the stent was in good position.     Stent removal: A total of 1 stent(s) were removed (9x38 icast) using the non-traumatic rescue forceps    Therapeutic suctioning: 15-20min of operative time was spent clearing out the airway of debris, blood and mucous prior to the intervention.     Tumor/Tissue Debulking: The Right mainstem airway was accessed and tumor/tissue debulking was performed using the 2.4mm ERBE  cryoprobe. Hemostasis and patency of the airway was acheived post-debulking.    Any disposable equipment was visually inspected and deemed to be intact immediately post procedure.      Relevant Pictures    Main gee      R main granulation tissue      L anastomosis      R anastomosis stenosis after gran tissue removed      R anastomosis after dilated up to 10 mm      RML and RLL        R anastomosis stent, 12x30 mm      R anastomosis stent      Recommendations:     -->  Bronch in 6 weeks  -->  Saline nebs twice daily  -->  Cough suppressant ok  -->  To get CXR quickly if she has sudden change in respiratory symptoms.      Preet Patel MD  Interventional Pulmonary  Department of Pulmonary, Allergy, Critical Care and Sleep Medicine   [facility]    Krystle Salgado CNS, OCN  Clinical Nurse Specialist  Department of Thoracic Surgery  Office: [phone]  Email: [email or web address]    Chloe Avila  Interventional Pulmonology Surgery Scheduler  Office: [phone]  Email: [email or web address]      I was present with the patient, Kecia Blue, for the entire viewing portion of the bronchscopy procedure (including scope insertion and withdrawal) and agree with the interpretation and report as documented by Dr. Alex Patel.   Alana Lin MD

## 2018-10-29 NOTE — ED PROVIDER NOTES
"  History     Chief Complaint   Patient presents with     Nausea & Vomiting     HPI  Kecia Blue is a 57 year old female with PMH notable for ILD and pulmonary HTN s/p bilateral lung transplant 3/1/2018 who presents to the ED with fatigue and vomiting. Patient reports she has had about 3 episodes vomiting per day, 1 episode diarrhea per day for the past 8 weeks. The stool had mostly firmed up until an additional episode of watery diarrhea this morning. 2 episodes vomiting in the past 6 hours. Patient presenting now due to \"feeling more worn out\". She notes that she is scheduled for a bronch in clinic later this morning. She has been using O2 by NC at home, has had SpO2 in upper 90s. She has had cough productive of white sputum for weeks without change.     I have reviewed the Medications, Allergies, Past Medical and Surgical History, and Social History in the Epic system.    Review of Systems  A complete review of systems was performed with pertinent positives and negatives noted in the HPI, and all other systems negative.     Physical Exam   BP: 131/85  Heart Rate: 85  Temp: 98.4  F (36.9  C)  Resp: 20  Height: 162.6 cm (5' 4\")  Weight: 49.9 kg (110 lb)  SpO2: 99 %      Physical Exam  /82  Temp 98.4  F (36.9  C) (Oral)  Resp 20  Ht 1.626 m (5' 4\")  Wt 49.9 kg (110 lb)  LMP 06/07/2014 (Exact Date)  SpO2 99%  BMI 18.88 kg/m2  General: no acute distress. Appears stated age. NC in place  HENT: dry MM, no oropharyngeal lesions  Eyes: PERRL, normal sclerae  Cardio: regular rate. Regular rhythm. Extremities well perfused  Resp: Normal work of breathing, coarse breath sounds. NC in place.   Abdomen: no tenderness, non-distended, no rebound, no guarding  Neuro: alert and fully oriented. CN II-XII grossly intact. Grossly normal strength and sensation in all extremities.   MSK: no deformities.   Integumentary/Skin: no rash visualized, normal color  Psych: normal affect, normal behavior    ED Course     ED " Course     Procedures        Critical Care time:  none     Labs Ordered and Resulted from Time of ED Arrival Up to the Time of Departure from the ED   CBC WITH PLATELETS DIFFERENTIAL - Abnormal; Notable for the following:        Result Value    RBC Count 2.63 (*)     Hemoglobin 7.8 (*)     Hematocrit 26.0 (*)     MCHC 30.0 (*)     Absolute Lymphocytes 0.2 (*)     All other components within normal limits   COMPREHENSIVE METABOLIC PANEL - Abnormal; Notable for the following:     Glucose 117 (*)     Creatinine 1.11 (*)     GFR Estimate 51 (*)     Albumin 2.9 (*)     All other components within normal limits   INR   TACROLIMUS LEVEL   PERIPHERAL IV CATHETER            Assessments & Plan (with Medical Decision Making)   Patient presenting with generalized fatigue in the context of subacute vomiting and diarrhea in patient with bilateral lung transplants. Vitals in the ED wnl. Exam with dry MM suggestive of hypovolemia. 1 L NS bolused. The patient was given ondansetron for nausea with improvement in symptoms upon reassessment. No vomiting in the ED.     No leukocytosis nor fever to suggest significant infection. 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 Patient reports one watery stool today but otherwise formed stools lately, no indication for repeat testing with only one watery stool. Tacrolimus level pending. CXR with low volumes, faint streaky opacities, not very convincing for pneumonia. Patient with mild chronic shortness of breath but with SpO2 %. Discussed the case with Dr. Sapp (on call pulmonology) and Dr. Riley of pulmonology, who the patient is scheduled to see at 0700. They agree patient ok to be seen in clinic this morning, and likely have bronch.     The complete clinical picture is most consistent with hypovolemia due to vomiting. After counseling on the diagnosis, work-up, and treatment plan, the patient was discharged to  pulmonology clinic. The patient was advised to return to the ED if worsening symptoms, fever, or if there are any urgent/life-threatening concerns.       Clinical Impression:  Hypovolemia dehydration  Vomiting      Martín Garcai MD  Emergency Medicine     I have reviewed the nursing notes.    I have reviewed the findings, diagnosis, plan and need for follow up with the patient.    New Prescriptions    No medications on file       Final diagnoses:   Non-intractable vomiting with nausea, unspecified vomiting type   Hypovolemia dehydration       10/29/2018   [facility], [place], EMERGENCY DEPARTMENT     Martín Garcia MD  10/29/18 0658

## 2018-10-29 NOTE — IP AVS SNAPSHOT
MRN:[id number]                      After Visit Summary   10/29/2018    Kecia Blue    MRN: [id number]           Thank you!     Thank you for choosing [facility] for your care. Our goal is always to provide you with excellent care. Hearing back from our patients is one way we can continue to improve our services. Please take a few minutes to complete the written survey that you may receive in the mail after you visit with us. Thank you!        Patient Information     Date Of Birth          1960        Designated Caregiver       Most Recent Value    Caregiver    Will someone help with your care after discharge? yes    Name of designated caregiver lary     Phone number of caregiver [phone]      About your hospital stay     You were admitted on:  October 29, 2018 You last received care in the:  Post Anesthesia Care Unit [facility]    You were discharged on:  October 29, 2018       Who to Call     For medical emergencies, please call 911.  For non-urgent questions about your medical care, please call your primary care provider or clinic, [phone]  For questions related to your surgery, please call your surgery clinic        Attending Provider     Provider Wilber Hess MD Pulmonary       Primary Care Provider Office Phone # Fax #    Rosy Vu [phone]      Your next 10 appointments already scheduled     Nov 19, 2018  8:00 AM CST   XR CHEST 2 VIEWS with UCXR1   [facility] ([facility])    [address] [address] [phone]           How do I prepare for my exam? (Food and drink instructions) No Food and Drink Restrictions.  How do I prepare for my exam? (Other instructions) You do not need to do anything special for this exam.  What should I wear: Wear comfortable clothes.  How long does the exam take: Most scans take less than 5 minutes.   What should I bring: Bring a list of your medicines, including vitamins, minerals and over-the-counter drugs. It is safest to leave personal items at home.  Do I need a :  No  is needed.  What do I need to tell my doctor: Tell your doctor if there s any chance you are pregnant.  What should I do after the exam: No restrictions, You may resume normal activities.  What is this test: An image of a specific body part shown in shades of black and white.  Who should I call with questions: If you have any questions, please call the Imaging Department where you will have your exam. Directions, parking instructions, and other information is available on our website, [email or web address].            Nov 19, 2018  8:15 AM CST   Lab with  LAB   [facility] ([facility])    [address] [place] [address]   [phone]            Nov 19, 2018  8:30 AM CST   PFT VISIT with  PFL [facility] Pulmonary Function Testing ([facility])    [address] [place] [id number]   [phone]            Nov 19, 2018  9:00 AM CST   (Arrive by 8:45 AM)   Return Lung Transplant with Dean Riley MD   [facility] Solid Organ Transplant ([facility])    [address] [place] [address]   [phone]            Dec 13, 2018  8:40 AM CST   (Arrive by 8:25 AM)   New Patient Visit with Daron Cruz PA-C   [facility] Gastro[facility] ([facility])    [address] [place] [address]  4th [facility] [address]   [phone]              Further instructions from your care team       [facility]  Same-Day Surgery   Adult Discharge Orders & Instructions     For 24 hours after surgery    1. Get plenty of rest.  A responsible adult must stay with you for at least 24 hours after you leave the hospital.   2. Do not  drive or use heavy equipment.  If you have weakness or tingling, don't drive or use heavy equipment until this feeling goes away.  3. Do not drink alcohol.  4. Avoid strenuous or risky activities.  Ask for help when climbing stairs.   5. You may feel lightheaded.  IF so, sit for a few minutes before standing.  Have someone help you get up.   6. If you have nausea (feel sick to your stomach): Drink only clear liquids such as apple juice, ginger ale, broth or 7-Up.  Rest may also help.  Be sure to drink enough fluids.  Move to a regular diet as you feel able.  7. You may have a slight fever. Call the doctor if your fever is over 100 F (37.7 C) (taken under the tongue) or lasts longer than 24 hours.  8. You may have a dry mouth, a sore throat, muscle aches or trouble sleeping.  These should go away after 24 hours.  9. Do not make important or legal decisions.   Call your doctor for any of the followin.  Signs of infection (fever, growing tenderness at the surgery site, a large amount of drainage or bleeding, severe pain, foul-smelling drainage, redness, swelling).    2. It has been over 8 to 10 hours since surgery and you are still not able to urinate (pass water).    To contact a doctor, call Dr. Lin's office at [phone] or:    X   [phone] and ask for the resident on call for pulmonary transplant  (answered 24 hours a day)  X   Emergency Department:    [facility]: [phone]       (TTY for hearing impaired: [phone])          Post Bronchoscopy Patient Instructions:    2018  Kecia Blue    Your procedure completed (bronchoscopy with stent placement) without any immediate complications.  You may cough up scant amount of blood for the next 12 hours. If you have excessive cough with blood, chest pain, shortness of breath, please report to the closest emergency room.    You may experience low grade (less than 100.5 F) fever next 24 hours, if so you can take tylenol. If the  "fever persists more than 24 hours contact to our office or your primary care provider.    Our office (Thoracic/Pulmonary--[phone]) will call you to schedule next bronchoscopy in 6 weeks time.    Should you have any question, please do not hesitate to call our office.    DA Lin MD    Additional Information     If you use hormonal birth control (such as the pill, patch, ring or implants): You'll need a second form of birth control for 7 days (condoms, a diaphragm or contraceptive foam). While in the hospital, you received a medicine called Bridion. Your normal birth control will not work as well for a week after taking this medicine.          Pending Results     Date and Time Order Name Status Description    10/29/2018 1521 Surgical pathology exam In process     10/29/2018 1514 XR Chest Port 1 View Preliminary     10/29/2018 1412 Cytology non gyn In process     10/29/2018 1412 Respiratory Virus Panel by PCR In process     10/29/2018 1412 Lymphocyte Subset Bronchial In process     10/29/2018 1412 Bronchial Culture Aerobic Bacterial In process     10/29/2018 1412 Nocardia culture In process     10/29/2018 1412 Christian prep In process     10/29/2018 1412 Gram stain In process     10/29/2018 1412 Fungus Culture, non-blood In process     10/29/2018 1412 CMV DNA quantification In process     10/29/2018 1412 AFB Stain Non Blood In process     10/29/2018 1412 AFB Culture Non Blood In process     10/29/2018 1412 Actinomyces rule out In process             Admission Information     Date & Time Provider Department Dept. Phone    10/29/2018 Wilber Lin MD Post Anesthesia Care [facility] [phone]      Your Vitals Were     Blood Pressure Pulse Temperature Respirations Height Weight    114/75 84 98.2  F (36.8  C) (Axillary) 20 1.626 m (5' 4.02\") 50.6 kg (111 lb 8.8 oz)    Last Period Pulse Oximetry BMI (Body Mass Index)             06/07/2014 (Exact Date) 95% 19.14 kg/m2         MyChart Information "     [facility] gives you secure access to your electronic health record. If you see a primary care provider, you can also send messages to your care team and make appointments. If you have questions, please call your primary care clinic.  If you do not have a primary care provider, please call [phone] and they will assist you.        Care EveryWhere ID     This is your Care EveryWhere ID. This could be used by other organizations to access your [facility] medical records  [id number]        Equal Access to Services     ALBAN VALENCIA : Hadii aad ku hadasho Soomaali, waaxda luqadaha, qaybta kaalmada adeegyada, waxay pennie kelley. So [facility] [phone].    ATENCIÓN: Si habla español, tiene a taylor disposición servicios gratuitos de asistencia lingüística. [facility] [phone].    We comply with applicable federal civil rights laws and [place] laws. We do not discriminate on the basis of race, color, national origin, age, disability, sex, sexual orientation, or gender identity.               Review of your medicines      CONTINUE these medicines which may have CHANGED, or have new prescriptions. If we are uncertain of the size of tablets/capsules you have at home, strength may be listed as something that might have changed.        Dose / Directions    * predniSONE 2.5 MG tablet   Commonly known as:  DELTASONE   This may have changed:    - when to take this  - additional instructions   Used for:  Lung replaced by transplant (H)        Dose:  2.5 mg   Take 1 tablet (2.5 mg) by mouth daily 6/22/18             7.5                    7.5 7/20/18             7.5                    5 8/24/18             5                       5 9/21/18             5                       2.5   Quantity:  90 tablet   Refills:  1       * predniSONE 5 MG tablet   Commonly known as:  DELTASONE   This may have changed:    - how much to take  - how to take this  - when to take this  - additional instructions   Used for:  S/P  lung transplant (H)        Follow taper card   Quantity:  180 tablet   Refills:  3       * Notice:  This list has 2 medication(s) that are the same as other medications prescribed for you. Read the directions carefully, and ask your doctor or other care provider to review them with you.      CONTINUE these medicines which have NOT CHANGED        Dose / Directions    ACETAMINOPHEN PO        Dose:  1000 mg   Take 1,000 mg by mouth every 6 hours as needed for pain   Refills:  0       albuterol 108 (90 Base) MCG/ACT inhaler   Commonly known as:  PROAIR HFA/PROVENTIL HFA/VENTOLIN HFA   Used for:  SOB (shortness of breath), Wheezing        Dose:  2 puff   Inhale 2 puffs into the lungs every 6 hours as needed for shortness of breath / dyspnea or wheezing   Quantity:  1 Inhaler   Refills:  1       calcium carbonate 600 mg-vitamin D 400 units 600-400 MG-UNIT per tablet   Commonly known as:  CALTRATE   Used for:  S/P lung transplant (H), ILD (interstitial lung disease) (H), Lung transplant recipient (H), Encounter for long-term (current) use of high-risk medication, Anemia, unspecified type, Cytomegalovirus (CMV) viremia (H)        Dose:  1 tablet   Take 1 tablet by mouth daily   Refills:  0       dronabinol 5 MG capsule   Commonly known as:  MARINOL   Used for:  Delayed gastric emptying        Dose:  5 mg   Take 1 capsule (5 mg) by mouth 2 times daily   Quantity:  60 capsule   Refills:  1       guaiFENesin-codeine 100-10 MG/5ML Soln solution   Commonly known as:  ROBITUSSIN AC   Used for:  Cough        Dose:  1-2 tsp.   Take 5-10 mLs by mouth every 6 hours as needed for cough   Quantity:  120 mL   Refills:  0       magnesium oxide 400 MG tablet   Commonly known as:  MAG-OX   Used for:  Hypomagnesemia        Dose:  400 mg   Take 1 tablet (400 mg) by mouth daily   Quantity:  90 tablet   Refills:  3       metoclopramide 5 MG tablet   Commonly known as:  REGLAN   Used for:  Delayed gastric emptying        Dose:  5 mg   Take 1  tablet (5 mg) by mouth 2 times daily   Quantity:  60 tablet   Refills:  11       metoprolol tartrate 25 MG tablet   Commonly known as:  LOPRESSOR   Used for:  Paroxysmal atrial fibrillation (H)        Dose:  25 mg   Take 1 tablet (25 mg) by mouth 2 times daily   Quantity:  60 tablet   Refills:  11       multivitamin, therapeutic with minerals Tabs tablet   Used for:  Lung transplant recipient (H)        Dose:  1 tablet   Take 1 tablet by mouth daily   Quantity:  30 each   Refills:  11       mycophenolate 250 MG capsule   Commonly known as:  GENERIC EQUIVALENT   Used for:  S/P lung transplant (H)        Dose:  1500 mg   Take 6 capsules (1,500 mg) by mouth 2 times daily   Quantity:  360 capsule   Refills:  11       ondansetron 4 MG tablet   Commonly known as:  ZOFRAN   Used for:  Intractable vomiting with nausea, unspecified vomiting type        Dose:  4 mg   Take 1 tablet (4 mg) by mouth every 12 hours as needed for nausea   Quantity:  60 tablet   Refills:  1       order for DME   Used for:  Status post lung transplantation (H)        Equipment being ordered: Nebulizer   Quantity:  1 Device   Refills:  1       order for DME   Used for:  Lung transplant recipient (H)        Equipment being ordered: Nebulizer   Quantity:  1 Device   Refills:  1       pantoprazole 40 MG EC tablet   Commonly known as:  PROTONIX   Used for:  Gastroesophageal reflux disease without esophagitis, ILD (interstitial lung disease) (H), Lung transplant recipient (H)        Dose:  40 mg   Take 1 tablet (40 mg) by mouth 2 times daily   Quantity:  60 tablet   Refills:  3       pentoxifylline 400 MG CR tablet   Commonly known as:  TRENtal   Used for:  Lung transplant recipient (H)        Dose:  400 mg   Take 1 tablet (400 mg) by mouth 2 times daily   Quantity:  60 tablet   Refills:  11       sulfamethoxazole-trimethoprim 800-160 MG per tablet   Commonly known as:  BACTRIM DS/SEPTRA DS   Used for:  Actinomyces infection, Lung replaced by transplant  (H)        Dose:  1 tablet   Take 1 tablet by mouth daily X one month.   Quantity:  30 tablet   Refills:  1       * tacrolimus 0.5 MG capsule   Commonly known as:  GENERIC EQUIVALENT   Used for:  S/P lung transplant (H)        Dose:  0.5 mg   Take 1 capsule (0.5 mg) by mouth 2 times daily Along with four 1mg capsules for total dose of 4.5mg am and 4.5mg pm.   Quantity:  60 capsule   Refills:  11       * tacrolimus 1 MG capsule   Commonly known as:  GENERIC EQUIVALENT   Used for:  S/P lung transplant (H), Other constipation        Dose:  4 mg   Take 4 capsules (4 mg) by mouth 2 times daily Take 4 mg in the AM and 4 mg in the PM along with one 0.5mg for total dose 4.5mg am and 4.5mg pm.   Quantity:  240 capsule   Refills:  11       VALCYTE 450 MG tablet   Used for:  Cytomegalovirus (CMV) viremia (H), ILD (interstitial lung disease) (H), Lung transplant recipient (H), Encounter for long-term (current) use of high-risk medication, Anemia, unspecified type, S/P lung transplant (H)   Generic drug:  valGANciclovir        Dose:  450 mg   Take 450 mg by mouth daily   Quantity:  30 tablet   Refills:  6       * Notice:  This list has 2 medication(s) that are the same as other medications prescribed for you. Read the directions carefully, and ask your doctor or other care provider to review them with you.             Protect others around you: Learn how to safely use, store and throw away your medicines at www.disposemymeds.org.             Medication List: This is a list of all your medications and when to take them. Check marks below indicate your daily home schedule. Keep this list as a reference.      Medications           Morning Afternoon Evening Bedtime As Needed    ACETAMINOPHEN PO   Take 1,000 mg by mouth every 6 hours as needed for pain                                albuterol 108 (90 Base) MCG/ACT inhaler   Commonly known as:  PROAIR HFA/PROVENTIL HFA/VENTOLIN HFA   Inhale 2 puffs into the lungs every 6 hours as  needed for shortness of breath / dyspnea or wheezing                                calcium carbonate 600 mg-vitamin D 400 units 600-400 MG-UNIT per tablet   Commonly known as:  CALTRATE   Take 1 tablet by mouth daily                                dronabinol 5 MG capsule   Commonly known as:  MARINOL   Take 1 capsule (5 mg) by mouth 2 times daily                                guaiFENesin-codeine 100-10 MG/5ML Soln solution   Commonly known as:  ROBITUSSIN AC   Take 5-10 mLs by mouth every 6 hours as needed for cough                                magnesium oxide 400 MG tablet   Commonly known as:  MAG-OX   Take 1 tablet (400 mg) by mouth daily                                metoclopramide 5 MG tablet   Commonly known as:  REGLAN   Take 1 tablet (5 mg) by mouth 2 times daily                                metoprolol tartrate 25 MG tablet   Commonly known as:  LOPRESSOR   Take 1 tablet (25 mg) by mouth 2 times daily                                multivitamin, therapeutic with minerals Tabs tablet   Take 1 tablet by mouth daily                                mycophenolate 250 MG capsule   Commonly known as:  GENERIC EQUIVALENT   Take 6 capsules (1,500 mg) by mouth 2 times daily                                ondansetron 4 MG tablet   Commonly known as:  ZOFRAN   Take 1 tablet (4 mg) by mouth every 12 hours as needed for nausea                                order for DME   Equipment being ordered: Nebulizer                                order for DME   Equipment being ordered: Nebulizer                                pantoprazole 40 MG EC tablet   Commonly known as:  PROTONIX   Take 1 tablet (40 mg) by mouth 2 times daily                                pentoxifylline 400 MG CR tablet   Commonly known as:  TRENtal   Take 1 tablet (400 mg) by mouth 2 times daily                                * predniSONE 2.5 MG tablet   Commonly known as:  DELTASONE   Take 1 tablet (2.5 mg) by mouth daily 6/22/18             7.5                     7.5 7/20/18             7.5                    5 8/24/18             5                       5 9/21/18             5                       2.5                                * predniSONE 5 MG tablet   Commonly known as:  DELTASONE   Follow taper card                                sulfamethoxazole-trimethoprim 800-160 MG per tablet   Commonly known as:  BACTRIM DS/SEPTRA DS   Take 1 tablet by mouth daily X one month.                                * tacrolimus 0.5 MG capsule   Commonly known as:  GENERIC EQUIVALENT   Take 1 capsule (0.5 mg) by mouth 2 times daily Along with four 1mg capsules for total dose of 4.5mg am and 4.5mg pm.                                * tacrolimus 1 MG capsule   Commonly known as:  GENERIC EQUIVALENT   Take 4 capsules (4 mg) by mouth 2 times daily Take 4 mg in the AM and 4 mg in the PM along with one 0.5mg for total dose 4.5mg am and 4.5mg pm.                                VALCYTE 450 MG tablet   Take 450 mg by mouth daily   Generic drug:  valGANciclovir                                * Notice:  This list has 4 medication(s) that are the same as other medications prescribed for you. Read the directions carefully, and ask your doctor or other care provider to review them with you.

## 2018-10-29 NOTE — ED AVS SNAPSHOT
[facility], Emergency Department    [address] [facility] [address]    Phone:  [phone]                                       Kecia Blue   MRN: [id number]    Department:  [facility], Emergency Department   Date of Visit:  10/29/2018           Patient Information     Date Of Birth          1960        Your diagnoses for this visit were:     Non-intractable vomiting with nausea, unspecified vomiting type     Hypovolemia dehydration        You were seen by Martín Garcia MD.        Discharge Instructions       Please follow up with your pulmonologist this morning as already planned.     Return to the ED if you are having worsening symptoms, or any other urgent/life-threatening concerns.       Your next 10 appointments already scheduled     Oct 29, 2018  7:00 AM CDT   (Arrive by 6:45 AM)   Return Lung Transplant with Dean Riley MD   [facility] Solid Organ Transplant ([facility])    [address] [place] [address]  3rd [facility] [id number] [phone]            Oct 29, 2018   Procedure with Wilber Lin MD   [facility], Same Day Surgery (--)    [address] [facility] [address] [phone]            Nov 19, 2018  8:00 AM CST   XR CHEST 2 VIEWS with [id number]   [facility] Imaging [place] [facility] ([facility])    [address] [place] [address]  1st [facility] [id number] [phone]           How do I prepare for my exam? (Food and drink instructions) No Food and Drink Restrictions.  How do I prepare for my exam? (Other instructions) You do not need to do anything special for this exam.  What should I wear: Wear comfortable clothes.  How long does the exam take: Most scans take less than 5 minutes.  What should I bring: Bring a list of your medicines, including vitamins, minerals and over-the-counter drugs. It is safest to leave personal items at home.  Do I need a :  No  is needed.  What do I need to tell  my doctor: Tell your doctor if there s any chance you are pregnant.  What should I do after the exam: No restrictions, You may resume normal activities.  What is this test: An image of a specific body part shown in shades of black and white.  Who should I call with questions: If you have any questions, please call the Imaging Department where you will have your exam. Directions, parking instructions, and other information is available on our website, [email or web address].            Nov 19, 2018  8:15 AM CST   Lab with  LAB   [facility] ([facility])    [address] [place] [address]  1st [facility] [address]   [phone]            Nov 19, 2018  8:30 AM CST   PFT VISIT with  PFL [facility] Pulmonary Function Testing ([facility])    [address] [place] [address]   [phone]            Nov 19, 2018  9:00 AM CST   (Arrive by 8:45 AM)   Return Lung Transplant with Dean Riley MD   [facility] Solid Organ Transplant ([facility])    [address] [place] [address]   [phone]            Dec 13, 2018  8:40 AM CST   (Arrive by 8:25 AM)   New Patient Visit with Daron Cruz PA-C   [facility] Gastroenterology and IBD [facility] ([facility])    [address] [place] [address] [phone]              24 Hour Appointment Hotline       To make an appointment at any [facility] clinic, call [phone] ([phone]). If you don't have a family doctor or clinic, we will help you find one. [facility] clinics are conveniently located to serve the needs of you and your family.             Review of your medicines      Our records show that you are taking the medicines listed below. If these are incorrect, please call your family doctor or clinic.        Dose / Directions Last dose taken    ACETAMINOPHEN PO   Dose:  1000 mg        Take  1,000 mg by mouth every 6 hours as needed for pain   Refills:  0        albuterol 108 (90 Base) MCG/ACT inhaler   Commonly known as:  PROAIR HFA/PROVENTIL HFA/VENTOLIN HFA   Dose:  2 puff   Quantity:  1 Inhaler        Inhale 2 puffs into the lungs every 6 hours as needed for shortness of breath / dyspnea or wheezing   Refills:  1        calcium carbonate 600 mg-vitamin D 400 units 600-400 MG-UNIT per tablet   Commonly known as:  CALTRATE   Dose:  1 tablet        Take 1 tablet by mouth daily   Refills:  0        dronabinol 5 MG capsule   Commonly known as:  MARINOL   Dose:  5 mg   Quantity:  60 capsule        Take 1 capsule (5 mg) by mouth 2 times daily   Refills:  1        enoxaparin 60 MG/0.6ML injection   Commonly known as:  LOVENOX   Dose:  60 mg   Quantity:  12 Syringe        Inject 0.6 mLs (60 mg) Subcutaneous every 12 hours   Refills:  0        levalbuterol 45 MCG/ACT Inhaler   Commonly known as:  XOPENEX HFA   Dose:  2 puff   Quantity:  1 Inhaler        Inhale 2 puffs into the lungs every 6 hours as needed for shortness of breath / dyspnea or wheezing   Refills:  11        magnesium oxide 400 MG tablet   Commonly known as:  MAG-OX   Dose:  400 mg   Quantity:  90 tablet        Take 1 tablet (400 mg) by mouth daily   Refills:  3        metoclopramide 5 MG tablet   Commonly known as:  REGLAN   Dose:  5 mg   Quantity:  60 tablet        Take 1 tablet (5 mg) by mouth 2 times daily   Refills:  11        metoprolol tartrate 25 MG tablet   Commonly known as:  LOPRESSOR   Dose:  25 mg   Quantity:  60 tablet        Take 1 tablet (25 mg) by mouth 2 times daily   Refills:  11        multivitamin, therapeutic with minerals Tabs tablet   Dose:  1 tablet   Quantity:  30 each        Take 1 tablet by mouth daily   Refills:  11        mycophenolate 250 MG capsule   Commonly known as:  GENERIC EQUIVALENT   Dose:  1500 mg   Quantity:  360 capsule        Take 6 capsules (1,500 mg) by mouth 2 times daily   Refills:  11         NONFORMULARY        Vit K 10 mg take 10/9/18 and possible 10/10/18 depending on INR (per patient)   Refills:  0        order for DME   Quantity:  1 Device        Equipment being ordered: Nebulizer   Refills:  1        order for DME   Quantity:  1 Device        Equipment being ordered: Nebulizer   Refills:  1        pantoprazole 40 MG EC tablet   Commonly known as:  PROTONIX   Dose:  40 mg   Quantity:  60 tablet        Take 1 tablet (40 mg) by mouth 2 times daily   Refills:  3        pentoxifylline 400 MG CR tablet   Commonly known as:  TRENtal   Dose:  400 mg   Quantity:  60 tablet        Take 1 tablet (400 mg) by mouth 2 times daily   Refills:  11        * predniSONE 2.5 MG tablet   Commonly known as:  DELTASONE   Dose:  2.5 mg   Quantity:  90 tablet        Take 1 tablet (2.5 mg) by mouth daily 6/22/18             7.5                    7.5 7/20/18             7.5                    5 8/24/18             5                       5 9/21/18             5                       2.5   Refills:  1        * predniSONE 5 MG tablet   Commonly known as:  DELTASONE   Quantity:  180 tablet        Follow taper card   Refills:  3        sulfamethoxazole-trimethoprim 800-160 MG per tablet   Commonly known as:  BACTRIM DS/SEPTRA DS   Dose:  1 tablet   Quantity:  30 tablet        Take 1 tablet by mouth daily X one month.   Refills:  1        * tacrolimus 0.5 MG capsule   Commonly known as:  GENERIC EQUIVALENT   Dose:  0.5 mg   Quantity:  60 capsule        Take 1 capsule (0.5 mg) by mouth 2 times daily Along with four 1mg capsules for total dose of 4.5mg am and 4.5mg pm.   Refills:  11        * tacrolimus 1 MG capsule   Commonly known as:  GENERIC EQUIVALENT   Dose:  4 mg   Quantity:  240 capsule        Take 4 capsules (4 mg) by mouth 2 times daily Take 4 mg in the AM and 4 mg in the PM along with one 0.5mg for total dose 4.5mg am and 4.5mg pm.   Refills:  11        VALCYTE 450 MG tablet   Dose:  450 mg   Quantity:  30 tablet    Generic drug:  valGANciclovir        Take 450 mg by mouth daily   Refills:  6        warfarin 1 MG tablet   Commonly known as:  COUMADIN   Quantity:  140 tablet        Take 4mg MWFSat and 6mg TuThSun, or as directed by the [facility]   Refills:  3        * Notice:  This list has 4 medication(s) that are the same as other medications prescribed for you. Read the directions carefully, and ask your doctor or other care provider to review them with you.            Procedures and tests performed during your visit     CBC with platelets differential    Chest XR,  PA & LAT    Comprehensive metabolic panel    INR    Peripheral IV catheter      Orders Needing Specimen Collection     None      Pending Results     Date and Time Order Name Status Description    10/29/2018 0548 Chest XR,  PA & LAT Preliminary             Pending Culture Results     No orders found from 10/27/2018 to 10/30/2018.            Pending Results Instructions     If you had any lab results that were not finalized at the time of your Discharge, you can call the ED Lab Result RN at [phone]. You will be contacted by this team for any positive Lab results or changes in treatment. The nurses are available 7 days a week from 10A to 6:30P.  You can leave a message 24 hours per day and they will return your call.        Thank you for choosing [facility]       Thank you for choosing [facility] for your care. Our goal is always to provide you with excellent care. Hearing back from our patients is one way we can continue to improve our services. Please take a few minutes to complete the written survey that you may receive in the mail after you visit with us. Thank you!        [employer] Information     [facility] gives you secure access to your electronic health record. If you see a primary care provider, you can also send messages to your care team and make appointments. If you have questions, please call your primary care clinic.  If  you do not have a primary care provider, please call [phone] and they will assist you.        Care EveryWhere ID     This is your Care EveryWhere ID. This could be used by other organizations to access your [facility] medical records  [id number]        Equal Access to Services     ALBAN VALENCIA : Sahara Lucas, yasmin sierra, qaigor kaalmamalini vaz, morro kelley. So [facility] [phone].    ATENCIÓN: Si habla español, tiene a taylor disposición servicios gratuitos de asistencia lingüística. Llame al [phone].    We comply with applicable federal civil rights laws and [place] laws. We do not discriminate on the basis of race, color, national origin, age, disability, sex, sexual orientation, or gender identity.            After Visit Summary       This is your record. Keep this with you and show to your community pharmacist(s) and doctor(s) at your next visit.

## 2018-10-29 NOTE — LETTER
10/29/2018      RE: Kecia Blue  [address] [place]  [facility] [address]       [facility]  Pulmonary Medicine/Lung Transplant  October 29, 2018         Today's visit note:     PATIENT PROFILE:  Kecia Blue is a 57-year-old female with history of dermatomyositis, seronegative rheumatoid arthritis, interstitial lung disease and pulmonary hypertension who is status post bilateral sequential lung transplant in March 2018.  Prior to transplant she required emergent [facility] ECMO for acute right heart failure and acute on chronic hypoxic respiratory failure.  Her immediate post transplant course was uncomplicated except for recurrent right-sided pleural effusions.  More recently she has had significant narrowing of her right bronchial anastomosis requiring multiple dilations and stent placement. She has also been experiencing recurrent episodes of vomiting with significant weight loss.    INTERVAL HISTORY: The patient was last seen in our clinic approximately 3 weeks ago and comes in today for a follow-up.  She is scheduled to undergo a bronchial dilation with stent placement in her right mainstem later today in the OR.  She reports that she has been feeling poorly in regards to frequent coughing that started about a week after her last bronchoscopy when her right mainstem foot was removed and she underwent balloon dilation.  She reports getting episodes of coughing that will not stop until she gags and then vomits.  She has been vomiting at least 2-3 times a day.  These episodes have been going on for several months but have certainly progressed in the last 2 weeks.  Last night she had another episode like that when she could not stop coughing in had vomiting.  She went to the emergency room for that.  All her labs and other workup was stable so she was discharged to be seen in clinic this morning.  She reports that she is short of breath with any exertion and is limited in her ADLs  "including showering.  She has been using supplemental oxygen for the last 2 days just for comfort however her saturations stay above 92% all the time.  She is usually not short of breath at rest.  She has frequent cough which is dry although it sounds wet but is not able to bring any sputum up.  She also reports occasional wheezing.  She denies any hemoptysis.     REVIEW OF SYSTEMS:     1.  She denies any recent fevers, chills or night sweats.  Her appetite is very poor.  She denies early satiety or bad taste.  She just does not feel like eating.  2.  She denies any nasal congestion.  She reports some blurry vision in her right eye.  3.  She denies any chest pain palpitations.  She does have some lightheadedness.  4.  She reports active nausea with episodes of vomiting.  She also had one episode of diarrhea today but otherwise has normal bowel movements.  She denies any abdominal cramping.  5.  She denies any lower extremity is edema.  There are no bruises or rashes.  6.  She has mild upper extremity tremors.  She reports that her sleep is good.      Rest of the review of systems is negative except as noted above.      PULMONARY FUNCTION TESTS: Pulmonary function test was deferred today.  She has known very severe restriction on her PFTs from before.     IMAGING: Low lung volumes, bilaterally clear lung fields.  No effusion or infiltrate.     PHYSICAL EXAMINATION:   /79 (BP Location: Left arm, Patient Position: Sitting, Cuff Size: Adult Small)  Pulse 99  Temp 98.1  F (36.7  C) (Oral)  Resp 18  Ht 1.626 m (5' 4\")  Wt 50.6 kg (111 lb 9.6 oz)  LMP 06/07/2014 (Exact Date)  SpO2 97%  BMI 19.16 kg/m2  GENERAL: Pleasant female sitting comfortably, speaking in full sentences without any discomfort.  Occasional wheezing heard while speaking.  Appears pale.  EYES: Significant pallor.  No icterus.  HEENT: Moist mucous membranes, no cer normal intensity breath sounds bilaterally without any added sounds " particularly wheezing.  CARDIOVASCULAR: S1-S2 normal with no murmurs rubs or gallops.  Regular rate and rhythm.  Tachycardia noted.  ABDOMEN:  Soft, soft nontender, nondistended, normoactive bowel sounds.  MUSCULOSKELETAL: No lower extremities edema.  No upper extremity tremors.  NEUROLOGIC: Grossly intact  PSYCHIATRIC: Normal affect  Appears pale throughout, no rashes or bruises.     ASSESSMENT AND PLAN: The patient is a 57-year-old female who is 7 months status post bilateral sequential lung transplant for interstitial lung disease related to rheumatoid arthritis.  Her posttransplant course has been complicated by right mainstem anastomotic stenosis requiring multiple dilation and stent placement.  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    1.  Bilateral lung transplant, complicated by right airway stenosis: Patient is scheduled for bronchoscopy with bronchial dilation and possible stent placement today.  She will continue the 3 drug immunosuppression including tacrolimus with a goal of 10-15 ng/mL, mycophenolate 1500 mg twice a day and prednisone 7.5 mg total daily.  Her tacrolimus level from today is pending, we will adjust the dose as indicated according to protocol.  Her most recent PRA is negative for any donor specific antibodies.    2.  Infectious disease: Patient remains on Bactrim for PCP prophylaxis.  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  She also had an episode of CMV viremia, is currently being treated with prophylactic doses of Valcyte which will be continued.  She had a high risk donor and will need labs in 12 months.    3.  Recurrent vomiting: This is likely precipitated by episodes of cough resulting in gagging.  She has an upcoming appointment with GI in December.  Her appetite is also significantly low, she has lost some weight.  At this time it does not appear that this is being driven by  GI issue, it is very likely that her vomiting is mechanical due to gagging from cough.  Since she is scheduled to undergo a bronchoscopy in the OR today, we will contact our GI colleagues to consider an EGD at the same time to evaluate for gastric outflow obstruction.  We prescribed Zofran to be taken scheduled twice a day over the next 2 weeks in order to prevent the vomiting.  Also prescribed Robitussin AC to suppress the cough.    4.  Hypertension: This appears to be well treated with metoprolol which will be continued.    5.  Hypomagnesemia: Patient to continue magnesium replacement.    6.  Dermatomyositis with ray nods phenomenon: We will continue to follow with rheumatology.    7.  Provoked left upper extremity DVT and right IJ clot: Anticoagulation has been stopped with completion of 6 months of treatment.    8.  CKD stage III: Kidney function appears to be at baseline.  We will continue to monitor.    9.  Anemia: Likely a combination of iron deficiency due to poor oral intake and anemia of chronic disease.  We will continue to follow.    Patient will return in clinic in 4 weeks with repeat labs, PFTs and chest imaging.         Data:     I reviewed all resulted lab tests and imaging studies.    Results for orders placed or performed in visit on 10/22/18   Tacrolimus level   Result Value Ref Range    Tacrolimus Last Dose 2230 10/21/18     Tacrolimus Level 14.8 5.0 - 15.0 ug/L              Medications:     No current facility-administered medications for this visit.      No current outpatient prescriptions on file.            Past Medical and Surgical History:     Past Medical History:   Diagnosis Date     Antisynthetase syndrome (H) 2014     Chronic cough      Chronic infection     recent C diff  8/18     Dehydration 8/1/2018     Dermatomyositis (H)      Dysplasia of cervix, low grade (ESTRADA 1)      ILD (interstitial lung disease) (H)      Osteopenia      PONV (postoperative nausea and vomiting)      Pulmonary  hypertension (H)      Raynaud's disease      Seronegative rheumatoid arthritis (H)      Past Surgical History:   Procedure Laterality Date     BRONCHOSCOPY (RIGID OR FLEXIBLE), DIAGNOSTIC N/A 4/10/2018    Procedure: COMBINED BRONCHOSCOPY (RIGID OR FLEXIBLE), LAVAGE;;  Surgeon: Mariposa Donohue MD;  Location: [facility]     BRONCHOSCOPY (RIGID OR FLEXIBLE), DILATE BRONCHUS / TRACHEA N/A 10/11/2018    Procedure: BRONCHOSCOPY (RIGID OR FLEXIBLE), DILATE BRONCHUS / TRACHEA;  Flexible And Rigid Bronchoscopy and Dilation;  Surgeon: Wilber Lin MD;  Location: [facility] OR     BRONCHOSCOPY FLEXIBLE N/A 3/13/2018    Procedure: BRONCHOSCOPY FLEXIBLE;  Flexible Bronchoscopy ;  Surgeon: Gissell Sanchez MD;  Location: [facility]     BRONCHOSCOPY FLEXIBLE N/A 5/9/2018    Procedure: BRONCHOSCOPY FLEXIBLE;;  Surgeon: Wilber Lin MD;  Location: [facility]     BRONCHOSCOPY FLEXIBLE AND RIGID N/A 9/10/2018    Procedure: BRONCHOSCOPY FLEXIBLE AND RIGID;  Flexible and Rigid Bronchoscopy with Balloon Dilation, tissue debulking with cryo, and Right mainstem bronchus stent placement;  Surgeon: Wilber Lin MD;  Location: [facility]     BRONCHOSCOPY RIGID N/A 6/6/2018    Procedure: BRONCHOSCOPY RIGID;;  Surgeon: Lopez Macias MD;  Location: [facility]     BRONCHOSCOPY, DILATE BRONCHUS, STENT BRONCHUS, COMBINED N/A 6/11/2018    Procedure: COMBINED BRONCHOSCOPY, DILATE BRONCHUS, STENT BRONCHUS;  Flexible Bronchoscopy, Balloon Dilation, Bronchial Washings;  Surgeon: Wilber Lin MD;  [facility]     BRONCHOSCOPY, DILATE BRONCHUS, STENT BRONCHUS, COMBINED Right 7/10/2018    Procedure: COMBINED BRONCHOSCOPY, DILATE BRONCHUS, STENT BRONCHUS;  Flexible Bronchoscopy, Balloon Dilation, Bronchial Washings  ;  Surgeon: Wilber Lin MD;  [facility]: [facility]     BRONCHOSCOPY, DILATE BRONCHUS, STENT BRONCHUS, COMBINED N/A 8/2/2018    Procedure: COMBINED BRONCHOSCOPY, DILATE BRONCHUS, STENT BRONCHUS;  Flexible  Bronchoscopy, Bronchial Washings, Balloon Dilation;  Surgeon: Wilber Lin MD;  [facility]: [facility]     BRONCHOSCOPY, DILATE BRONCHUS, STENT BRONCHUS, COMBINED N/A 8/20/2018    Procedure: COMBINED BRONCHOSCOPY, DILATE BRONCHUS, STENT BRONCHUS;  Flexible Bronchoscopy, Balloon Dilation;  Surgeon: Wilber Lin MD;  Location: [facility]     ENT SURGERY      tonsillectomy as a child     INSERT EXTRACORPORAL MEMBRANE OXYGENATOR ADULT (OUTSIDE OR) N/A 2/27/2018    Procedure: INSERT EXTRACORPORAL MEMBRANE OXYGENATOR ADULT (OUTSIDE OR);  INSERT EXTRACORPORAL MEMBRANE OXYGENATOR ADULT (OUTSIDE OR) ;  Surgeon: Toby Hernandez MD;  Location: [facility]     no prior surgery       REMOVE EXTRACORPORAL MEMBRANE OXYGENATOR ADULT N/A 3/3/2018    Procedure: REMOVE EXTRACORPORAL MEMBRANE OXYGENATOR ADULT;  Removal of Right Femoral Venous and Right Axillary Arterial Extracorporeal Membrane Oxygenator;  Surgeon: Toby Hernandez MD;  [facility]     TRANSPLANT LUNG RECIPIENT SINGLE X2 Bilateral 3/1/2018    Procedure: TRANSPLANT LUNG RECIPIENT SINGLE X2;  Median Sternotomy, Extracorporeal Membrane Oxygenator, bilateral sequential lung transplant;  Surgeon: Toby Hernandez MD;  Location: [facility]           Family History:     Family History   Problem Relation Age of Onset     Hypertension Mother      Arthritis Mother      Pancreatic Cancer Father      Diabetes Father             Social History:     Social History     Social History     Marital status:      Spouse name: N/A     Number of children: N/A     Years of education: N/A     Occupational History     Not on file.     Social History Main Topics     Smoking status: Never Smoker     Smokeless tobacco: Never Used     Alcohol use No     Drug use: No     Sexual activity: Not on file     Other Topics Concern     Parent/Sibling W/ Cabg, Mi Or Angioplasty Before 65f 55m? No     Social History Narrative       Dean Riley MD    of Medicine  Pulmonary, Critical Care and Sleep Medicine  [facility]  Pager: [phone]

## 2018-10-29 NOTE — PROGRESS NOTES
Kecia, this level is most likely unreliable, so please get another next week when you're feeling better.  Call with questions.  Magaly

## 2018-10-29 NOTE — PROGRESS NOTES
[facility]  Pulmonary Medicine/Lung Transplant  October 29, 2018         Today's visit note:     PATIENT PROFILE:  Kecia Blue is a 57-year-old female with history of dermatomyositis, seronegative rheumatoid arthritis, interstitial lung disease and pulmonary hypertension who is status post bilateral sequential lung transplant in March 2018.  Prior to transplant she required emergent [facility] ECMO for acute right heart failure and acute on chronic hypoxic respiratory failure.  Her immediate post transplant course was uncomplicated except for recurrent right-sided pleural effusions.  More recently she has had significant narrowing of her right bronchial anastomosis requiring multiple dilations and stent placement. She has also been experiencing recurrent episodes of vomiting with significant weight loss.    INTERVAL HISTORY: The patient was last seen in our clinic approximately 3 weeks ago and comes in today for a follow-up.  She is scheduled to undergo a bronchial dilation with stent placement in her right mainstem later today in the OR.  She reports that she has been feeling poorly in regards to frequent coughing that started about a week after her last bronchoscopy when her right mainstem foot was removed and she underwent balloon dilation.  She reports getting episodes of coughing that will not stop until she gags and then vomits.  She has been vomiting at least 2-3 times a day.  These episodes have been going on for several months but have certainly progressed in the last 2 weeks.  Last night she had another episode like that when she could not stop coughing in had vomiting.  She went to the emergency room for that.  All her labs and other workup was stable so she was discharged to be seen in clinic this morning.  She reports that she is short of breath with any exertion and is limited in her ADLs including showering.  She has been using supplemental oxygen for the last 2 days just for  "comfort however her saturations stay above 92% all the time.  She is usually not short of breath at rest.  She has frequent cough which is dry although it sounds wet but is not able to bring any sputum up.  She also reports occasional wheezing.  She denies any hemoptysis.     REVIEW OF SYSTEMS:     1.  She denies any recent fevers, chills or night sweats.  Her appetite is very poor.  She denies early satiety or bad taste.  She just does not feel like eating.  2.  She denies any nasal congestion.  She reports some blurry vision in her right eye.  3.  She denies any chest pain palpitations.  She does have some lightheadedness.  4.  She reports active nausea with episodes of vomiting.  She also had one episode of diarrhea today but otherwise has normal bowel movements.  She denies any abdominal cramping.  5.  She denies any lower extremity is edema.  There are no bruises or rashes.  6.  She has mild upper extremity tremors.  She reports that her sleep is good.      Rest of the review of systems is negative except as noted above.      PULMONARY FUNCTION TESTS: Pulmonary function test was deferred today.  She has known very severe restriction on her PFTs from before.     IMAGING: Low lung volumes, bilaterally clear lung fields.  No effusion or infiltrate.     PHYSICAL EXAMINATION:   /79 (BP Location: Left arm, Patient Position: Sitting, Cuff Size: Adult Small)  Pulse 99  Temp 98.1  F (36.7  C) (Oral)  Resp 18  Ht 1.626 m (5' 4\")  Wt 50.6 kg (111 lb 9.6 oz)  LMP 06/07/2014 (Exact Date)  SpO2 97%  BMI 19.16 kg/m2  GENERAL: Pleasant female sitting comfortably, speaking in full sentences without any discomfort.  Occasional wheezing heard while speaking.  Appears pale.  EYES: Significant pallor.  No icterus.  HEENT: Moist mucous membranes, no cer normal intensity breath sounds bilaterally without any added sounds particularly wheezing.  CARDIOVASCULAR: S1-S2 normal with no murmurs rubs or gallops.  Regular rate " and rhythm.  Tachycardia noted.  ABDOMEN:  Soft, soft nontender, nondistended, normoactive bowel sounds.  MUSCULOSKELETAL: No lower extremities edema.  No upper extremity tremors.  NEUROLOGIC: Grossly intact  PSYCHIATRIC: Normal affect  Appears pale throughout, no rashes or bruises.     ASSESSMENT AND PLAN: The patient is a 57-year-old female who is 7 months status post bilateral sequential lung transplant for interstitial lung disease related to rheumatoid arthritis.  Her posttransplant course has been complicated by right mainstem anastomotic stenosis requiring multiple dilation and stent placement.  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    1.  Bilateral lung transplant, complicated by right airway stenosis: Patient is scheduled for bronchoscopy with bronchial dilation and possible stent placement today.  She will continue the 3 drug immunosuppression including tacrolimus with a goal of 10-15 ng/mL, mycophenolate 1500 mg twice a day and prednisone 7.5 mg total daily.  Her tacrolimus level from today is pending, we will adjust the dose as indicated according to protocol.  Her most recent PRA is negative for any donor specific antibodies.    2.  Infectious disease: Patient remains on Bactrim for PCP prophylaxis.  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  She also had an episode of CMV viremia, is currently being treated with prophylactic doses of Valcyte which will be continued.  She had a high risk donor and will need labs in 12 months.    3.  Recurrent vomiting: This is likely precipitated by episodes of cough resulting in gagging.  She has an upcoming appointment with GI in December.  Her appetite is also significantly low, she has lost some weight.  At this time it does not appear that this is being driven by GI issue, it is very likely that her vomiting is mechanical due to gagging from cough.  Since she  is scheduled to undergo a bronchoscopy in the OR today, we will contact our GI colleagues to consider an EGD at the same time to evaluate for gastric outflow obstruction.  We prescribed Zofran to be taken scheduled twice a day over the next 2 weeks in order to prevent the vomiting.  Also prescribed Robitussin AC to suppress the cough.    4.  Hypertension: This appears to be well treated with metoprolol which will be continued.    5.  Hypomagnesemia: Patient to continue magnesium replacement.    6.  Dermatomyositis with ray nods phenomenon: We will continue to follow with rheumatology.    7.  Provoked left upper extremity DVT and right IJ clot: Anticoagulation has been stopped with completion of 6 months of treatment.    8.  CKD stage III: Kidney function appears to be at baseline.  We will continue to monitor.    9.  Anemia: Likely a combination of iron deficiency due to poor oral intake and anemia of chronic disease.  We will continue to follow.    Patient will return in clinic in 4 weeks with repeat labs, PFTs and chest imaging.         Data:     I reviewed all resulted lab tests and imaging studies.    Results for orders placed or performed in visit on 10/22/18   Tacrolimus level   Result Value Ref Range    Tacrolimus Last Dose 2230 10/21/18     Tacrolimus Level 14.8 5.0 - 15.0 ug/L              Medications:     No current facility-administered medications for this visit.      No current outpatient prescriptions on file.            Past Medical and Surgical History:     Past Medical History:   Diagnosis Date     Antisynthetase syndrome (H) 2014     Chronic cough      Chronic infection     recent C diff  8/18     Dehydration 8/1/2018     Dermatomyositis (H)      Dysplasia of cervix, low grade (ESTRADA 1)      ILD (interstitial lung disease) (H)      Osteopenia      PONV (postoperative nausea and vomiting)      Pulmonary hypertension (H)      Raynaud's disease      Seronegative rheumatoid arthritis (H)      Past  Surgical History:   Procedure Laterality Date     BRONCHOSCOPY (RIGID OR FLEXIBLE), DIAGNOSTIC N/A 4/10/2018    Procedure: COMBINED BRONCHOSCOPY (RIGID OR FLEXIBLE), LAVAGE;;  Surgeon: Mariposa Donohue MD;  Location: [facility]     BRONCHOSCOPY (RIGID OR FLEXIBLE), DILATE BRONCHUS / TRACHEA N/A 10/11/2018    Procedure: BRONCHOSCOPY (RIGID OR FLEXIBLE), DILATE BRONCHUS / TRACHEA;  Flexible And Rigid Bronchoscopy and Dilation;  Surgeon: Wilber Lin MD;  Location: [facility]     BRONCHOSCOPY FLEXIBLE N/A 3/13/2018    Procedure: BRONCHOSCOPY FLEXIBLE;  Flexible Bronchoscopy ;  Surgeon: Gissell Sanchez MD;  Location: [facility]     BRONCHOSCOPY FLEXIBLE N/A 5/9/2018    Procedure: BRONCHOSCOPY FLEXIBLE;;  Surgeon: Wilber Lin MD;  Location: [facility]     BRONCHOSCOPY FLEXIBLE AND RIGID N/A 9/10/2018    Procedure: BRONCHOSCOPY FLEXIBLE AND RIGID;  Flexible and Rigid Bronchoscopy with Balloon Dilation, tissue debulking with cryo, and Right mainstem bronchus stent placement;  Surgeon: Wilber Lin MD;  Location: [facility]     BRONCHOSCOPY RIGID N/A 6/6/2018    Procedure: BRONCHOSCOPY RIGID;;  Surgeon: Lopez Macias MD;  Location: [facility]     BRONCHOSCOPY, DILATE BRONCHUS, STENT BRONCHUS, COMBINED N/A 6/11/2018    Procedure: COMBINED BRONCHOSCOPY, DILATE BRONCHUS, STENT BRONCHUS;  Flexible Bronchoscopy, Balloon Dilation, Bronchial Washings;  Surgeon: Wilber Lin MD;  Location: [facility]     BRONCHOSCOPY, DILATE BRONCHUS, STENT BRONCHUS, COMBINED Right 7/10/2018    Procedure: COMBINED BRONCHOSCOPY, DILATE BRONCHUS, STENT BRONCHUS;  Flexible Bronchoscopy, Balloon Dilation, Bronchial Washings  ;  Surgeon: Wilber Lin MD;  Location: [facility]     BRONCHOSCOPY, DILATE BRONCHUS, STENT BRONCHUS, COMBINED N/A 8/2/2018    Procedure: COMBINED BRONCHOSCOPY, DILATE BRONCHUS, STENT BRONCHUS;  Flexible Bronchoscopy, Bronchial Washings, Balloon Dilation;  Surgeon: Wilber Lin MD;  Location:  [facility]     BRONCHOSCOPY, DILATE BRONCHUS, STENT BRONCHUS, COMBINED N/A 8/20/2018    Procedure: COMBINED BRONCHOSCOPY, DILATE BRONCHUS, STENT BRONCHUS;  Flexible Bronchoscopy, Balloon Dilation;  Surgeon: Wilber Lin MD;  [facility]     ENT SURGERY      tonsillectomy as a child     INSERT EXTRACORPORAL MEMBRANE OXYGENATOR ADULT (OUTSIDE OR) N/A 2/27/2018    Procedure: INSERT EXTRACORPORAL MEMBRANE OXYGENATOR ADULT (OUTSIDE OR);  INSERT EXTRACORPORAL MEMBRANE OXYGENATOR ADULT (OUTSIDE OR) ;  Surgeon: Toby Hernandez MD;  [facility]     no prior surgery       REMOVE EXTRACORPORAL MEMBRANE OXYGENATOR ADULT N/A 3/3/2018    Procedure: REMOVE EXTRACORPORAL MEMBRANE OXYGENATOR ADULT;  Removal of Right Femoral Venous and Right Axillary Arterial Extracorporeal Membrane Oxygenator;  Surgeon: Toby Hernandez MD;  [facility]     TRANSPLANT LUNG RECIPIENT SINGLE X2 Bilateral 3/1/2018    Procedure: TRANSPLANT LUNG RECIPIENT SINGLE X2;  Median Sternotomy, Extracorporeal Membrane Oxygenator, bilateral sequential lung transplant;  Surgeon: Toby Hernandez MD;  Location:  OR           Family History:     Family History   Problem Relation Age of Onset     Hypertension Mother      Arthritis Mother      Pancreatic Cancer Father      Diabetes Father             Social History:     Social History     Social History     Marital status:      Spouse name: N/A     Number of children: N/A     Years of education: N/A     Occupational History     Not on file.     Social History Main Topics     Smoking status: Never Smoker     Smokeless tobacco: Never Used     Alcohol use No     Drug use: No     Sexual activity: Not on file     Other Topics Concern     Parent/Sibling W/ Cabg, Mi Or Angioplasty Before 65f 55m? No     Social History Narrative       Dean Riley MD   of Medicine  Pulmonary, Critical Care and Sleep Medicine  [facility]  Pager:  [phone]

## 2018-10-29 NOTE — ANESTHESIA CARE TRANSFER NOTE
Patient: Kecia Blue    Procedure(s):  Flexible Bronchoscopy, Balloon Dilation, Stent Revision, Airway Examination And Therapeutic Suctioning, Cyro Tumor Debulking  COMBINED ESOPHAGOSCOPY, GASTROSCOPY, DUODENOSCOPY (EGD)    Diagnosis: History Lung Transplant   Diagnosis Additional Information: No value filed.    Anesthesia Type:   General, ETT     Note:  Airway :Nasal Cannula  Patient transferred to:PACU  Comments: Pt states comfort, VSS.Handoff Report: Identifed the Patient, Identified the Reponsible Provider, Reviewed the pertinent medical history, Discussed the surgical course, Reviewed Intra-OP anesthesia mangement and issues during anesthesia, Set expectations for post-procedure period and Allowed opportunity for questions and acknowledgement of understanding      Vitals: (Last set prior to Anesthesia Care Transfer)    CRNA VITALS  10/29/2018 1504 - 10/29/2018 1537      10/29/2018             Resp Rate (observed): 16                Electronically Signed By: ADRIÁN Rothman CRNA  October 29, 2018  3:37 PM

## 2018-10-29 NOTE — DISCHARGE INSTRUCTIONS
Please follow up with your pulmonologist this morning as already planned.     Return to the ED if you are having worsening symptoms, or any other urgent/life-threatening concerns.

## 2018-10-29 NOTE — NURSING NOTE
Transplant Coordinator Note    Reason for visit: Post lung transplant follow up visit   Coordinator: Present   Caregiver:      Health concerns addressed today:  1. Ongoing cough, followed by vomiting  2. OR Bronch today  3. Poor appetite/nausea    Activity: Limited related to nausea/vomiting    Oxygen needs: No PFT's prior to appointment as patient went to ER prior to appointment today.    Pain management: Tylenol prn    Diabetic management: NA      Health Maintenance: Flu shot    Labs, CXR, PFTs reviewed with patient  Medication record reviewed and reconciled  Questions and concerns addressed    Patient Instructions  1. Zofran scheduled two times a day, call  [facility] with an update  2. Discontinue reglan  3. Robitussin  AC four times a day as needed  4. Flu shot    Next transplant clinic appointment: 4 weeks  with CXR, labs and PFTs  Next lab draw:  Weekly in [employer] printed at time of check out

## 2018-10-29 NOTE — ED AVS SNAPSHOT
[facility], [place], Emergency Department    [address] [place] [id number]    Phone:  [phone]                                       Kecia Blue   MRN: [id number]    Department:  [facility], Emergency Department   Date of Visit:  10/29/2018           After Visit Summary Signature Page     I have received my discharge instructions, and my questions have been answered. I have discussed any challenges I see with this plan with the nurse or doctor.    ..........................................................................................................................................  Patient/Patient Representative Signature      ..........................................................................................................................................  Patient Representative Print Name and Relationship to Patient    ..................................................               ................................................  Date                                   Time    ..........................................................................................................................................  Reviewed by Signature/Title    ...................................................              ..............................................  Date                                               Time          22EPIC Rev 08/18

## 2018-10-29 NOTE — DISCHARGE INSTRUCTIONS
[facility]  Same-Day Surgery   Adult Discharge Orders & Instructions     For 24 hours after surgery    1. Get plenty of rest.  A responsible adult must stay with you for at least 24 hours after you leave the hospital.   2. Do not drive or use heavy equipment.  If you have weakness or tingling, don't drive or use heavy equipment until this feeling goes away.  3. Do not drink alcohol.  4. Avoid strenuous or risky activities.  Ask for help when climbing stairs.   5. You may feel lightheaded.  IF so, sit for a few minutes before standing.  Have someone help you get up.   6. If you have nausea (feel sick to your stomach): Drink only clear liquids such as apple juice, ginger ale, broth or 7-Up.  Rest may also help.  Be sure to drink enough fluids.  Move to a regular diet as you feel able.  7. You may have a slight fever. Call the doctor if your fever is over 100 F (37.7 C) (taken under the tongue) or lasts longer than 24 hours.  8. You may have a dry mouth, a sore throat, muscle aches or trouble sleeping.  These should go away after 24 hours.  9. Do not make important or legal decisions.   Call your doctor for any of the followin.  Signs of infection (fever, growing tenderness at the surgery site, a large amount of drainage or bleeding, severe pain, foul-smelling drainage, redness, swelling).    2. It has been over 8 to 10 hours since surgery and you are still not able to urinate (pass water).    To contact a doctor, call Dr. Lin's office at [phone] or:    X   [phone] and ask for the resident on call for pulmonary transplant  (answered 24 hours a day)  X   Emergency Department:    [facility]: [phone]       (TTY for hearing impaired: [phone])          Post Bronchoscopy Patient Instructions:    2018  Kecia Blue    Your procedure completed (bronchoscopy with stent placement) without any immediate complications.  You may cough up  scant amount of blood for the next 12 hours. If you have excessive cough with blood, chest pain, shortness of breath, please report to the closest emergency room.    You may experience low grade (less than 100.5 F) fever next 24 hours, if so you can take tylenol. If the fever persists more than 24 hours contact to our office or your primary care provider.    Our office (Thoracic/Pulmonary--[phone]) will call you to schedule next bronchoscopy in 6 weeks time.    Should you have any question, please do not hesitate to call our office.    DA Lin MD

## 2018-10-29 NOTE — PROCEDURES
Gastroenterology Endoscopy Suite Brief Operative Note    Procedure:  Upper endoscopy   Post-operative diagnosis:  see below   Staff Physician:  Dr. Chente Bloom   Fellow/Assistant(s):  NA    Specimens:  Please see final procedure note for further details.   Findings:  1. Normal esophagus  2. Small sliding hiatal hernia  3. Multiple gastric polyps in the body and fundus (likely benign fundic gland polyps) - 3 resected  4. Otherwise stomach normal (J shaped stomach). Random biopsies taken  5. Duodenum normal - biopsied   Complications:  None.   Condition:  Stable   Recommendations  Diet:  Return to previous diet  PPI:  N/A  Anti-coagulants/platelets:  N/A  Octreotide:  N/A  Discharge Planning:   Return to PACU for possible discharge same day

## 2018-10-30 DIAGNOSIS — J98.09 BRONCHIAL STENOSIS: Primary | ICD-10-CM

## 2018-10-30 LAB
CD19 CELLS NFR BRONCH: 0 %
CD3 CELLS NFR BRONCH: 71 %
CD3+CD4+ CELLS NFR BRONCH: 25 %
CD3+CD4+ CELLS/CD3+CD8+ CLL BRONCH: 0.61 %
CD3+CD8+ CELLS NFR BRONCH: 41 %
CD3-CD16+CD56+ CELLS NFR SPEC: 14 %
COPATH REPORT: NORMAL
FLUAV H1 2009 PAND RNA SPEC QL NAA+PROBE: NEGATIVE
FLUAV H1 RNA SPEC QL NAA+PROBE: NEGATIVE
FLUAV H3 RNA SPEC QL NAA+PROBE: NEGATIVE
FLUAV RNA SPEC QL NAA+PROBE: NEGATIVE
FLUBV RNA SPEC QL NAA+PROBE: NEGATIVE
HADV DNA SPEC QL NAA+PROBE: NEGATIVE
HADV DNA SPEC QL NAA+PROBE: NEGATIVE
HMPV RNA SPEC QL NAA+PROBE: NEGATIVE
HPIV1 RNA SPEC QL NAA+PROBE: NEGATIVE
HPIV2 RNA SPEC QL NAA+PROBE: NEGATIVE
HPIV3 RNA SPEC QL NAA+PROBE: NEGATIVE
IFC SPECIMEN: NORMAL
MICROBIOLOGIST REVIEW: NORMAL
RHINOVIRUS RNA SPEC QL NAA+PROBE: NEGATIVE
RSV RNA SPEC QL NAA+PROBE: NEGATIVE
RSV RNA SPEC QL NAA+PROBE: NEGATIVE
SPECIMEN SOURCE: NORMAL
T-CELL SUBSET INTERPRETATION: NORMAL

## 2018-10-31 LAB
CMV DNA SPEC NAA+PROBE-ACNC: 7070 [IU]/ML
CMV DNA SPEC NAA+PROBE-LOG#: 3.8 {LOG_IU}/ML
SPECIMEN SOURCE: ABNORMAL

## 2018-11-01 ENCOUNTER — TELEPHONE (OUTPATIENT)
Dept: PULMONOLOGY | Facility: CLINIC | Age: 58
End: 2018-11-01

## 2018-11-01 ENCOUNTER — TELEPHONE (OUTPATIENT)
Dept: TRANSPLANT | Facility: CLINIC | Age: 58
End: 2018-11-01

## 2018-11-01 DIAGNOSIS — B96.89 MORAXELLA CATARRHALIS BRONCHITIS: Primary | ICD-10-CM

## 2018-11-01 DIAGNOSIS — R05.9 COUGH: ICD-10-CM

## 2018-11-01 DIAGNOSIS — J40 MORAXELLA CATARRHALIS BRONCHITIS: Primary | ICD-10-CM

## 2018-11-01 LAB
ACID FAST STN SPEC QL: NORMAL
BACTERIA SPEC CULT: ABNORMAL
BACTERIA SPEC CULT: ABNORMAL
SPECIMEN SOURCE: ABNORMAL
SPECIMEN SOURCE: NORMAL

## 2018-11-01 RX ORDER — CODEINE PHOSPHATE AND GUAIFENESIN 10; 100 MG/5ML; MG/5ML
1-2 SOLUTION ORAL EVERY 6 HOURS PRN
Qty: 120 ML | Refills: 0 | Status: SHIPPED | OUTPATIENT
Start: 2018-11-01 | End: 2018-11-26

## 2018-11-01 RX ORDER — CODEINE PHOSPHATE AND GUAIFENESIN 10; 100 MG/5ML; MG/5ML
1-2 SOLUTION ORAL EVERY 6 HOURS PRN
Qty: 120 ML | Refills: 0 | Status: SHIPPED | OUTPATIENT
Start: 2018-11-01 | End: 2018-11-01

## 2018-11-01 NOTE — TELEPHONE ENCOUNTER
Pt to start Augmentin 875 mg BID for Moraxella catarrhalis and increased coughing since bronch. Pt to update with symptoms.

## 2018-11-01 NOTE — TELEPHONE ENCOUNTER
Spoke with patient to schedule procedure with Dr. Alana Lin    Procedure was scheduled on 12/06 At [facility] OR  Patient will have H&P with [facility], Pulmonary  Patient is aware a / is needed day of surgery.   Surgery letter was sent via [employer], patient has my direct contact information for any further questions.

## 2018-11-01 NOTE — TELEPHONE ENCOUNTER
Pt calls to report that she has been coughing in excess since the bronch on Monday. It is keeping her up at night. She is taking Robitussin and this has relieved the cough slightly. She has also had some emesis. No fevers or body aches reported. SATs are 97-98% on room air. Pt is worried about coughing out her stent. Will consult with her transplant physician on a plan.

## 2018-11-02 DIAGNOSIS — Z79.899 ENCOUNTER FOR LONG-TERM (CURRENT) USE OF HIGH-RISK MEDICATION: ICD-10-CM

## 2018-11-02 DIAGNOSIS — Z94.2 LUNG TRANSPLANT RECIPIENT (H): Primary | ICD-10-CM

## 2018-11-02 LAB — COPATH REPORT: NORMAL

## 2018-11-05 ENCOUNTER — TELEPHONE (OUTPATIENT)
Dept: TRANSPLANT | Facility: CLINIC | Age: 58
End: 2018-11-05

## 2018-11-05 DIAGNOSIS — Z94.2 LUNG REPLACED BY TRANSPLANT (H): Primary | ICD-10-CM

## 2018-11-05 RX ORDER — AZATHIOPRINE 50 MG/1
150 TABLET ORAL EVERY EVENING
Qty: 90 TABLET | Refills: 11 | Status: SHIPPED | OUTPATIENT
Start: 2018-11-05 | End: 2019-01-16

## 2018-11-05 NOTE — TELEPHONE ENCOUNTER
Due to biopsy pathology results, per Dr Riley and Geoffrey--stop mycophenolate and start azathioprine 150mg at bedtime.  Patient notified and will implement change.  TPMT 29.4 on 2/19/18    Of note, patient has been on this medication pre transplant and tolerated well.    Check CBC next blood draw.

## 2018-11-12 LAB
BACTERIA SPEC CULT: NORMAL
Lab: NORMAL
SPECIMEN SOURCE: NORMAL

## 2018-11-13 DIAGNOSIS — Z94.2 LUNG REPLACED BY TRANSPLANT (H): ICD-10-CM

## 2018-11-13 PROCEDURE — 80197 ASSAY OF TACROLIMUS: CPT | Performed by: INTERNAL MEDICINE

## 2018-11-14 ENCOUNTER — TELEPHONE (OUTPATIENT)
Dept: TRANSPLANT | Facility: CLINIC | Age: 58
End: 2018-11-14

## 2018-11-14 DIAGNOSIS — Z94.2 LUNG REPLACED BY TRANSPLANT (H): Primary | ICD-10-CM

## 2018-11-14 LAB
TACROLIMUS BLD-MCNC: 52.6 UG/L (ref 5–15)
TME LAST DOSE: ABNORMAL H

## 2018-11-14 NOTE — TELEPHONE ENCOUNTER
Patient to calls to report continued coughing that wakes her up at night and some wheezing. Recently was treated with Augmentin for Moraxella (Branhamella) catarrhalis, however this course finishing on 11/11/18. No pain, so she does not feel her stent has moved at all. VSS. No fever. Rarely coughing up phlegm. Discussed with Dr. Riley. Plan for chest x-ray. He sent a message to Dr. Lin to discuss need for bronch soon as her symptoms don't sound infectious anymore. Discussed with patient who agrees with plan.

## 2018-11-14 NOTE — LETTER
PHYSICIAN ORDERS      DATE & TIME ISSUED: 2018 12:48 PM  PATIENT NAME: Kecia Blue   : 1960     [facility] MR# [if applicable]: [id number]     DIAGNOSIS:  Lung Transplant  Z94.2    Imaging order: X-ray Chest 2 vws  Please push images through PACS once complete.     Any questions please call: Mikayla [phone]    Please fax these results to [phone].           Dean Riley MD

## 2018-11-15 ENCOUNTER — TRANSFERRED RECORDS (OUTPATIENT)
Dept: HEALTH INFORMATION MANAGEMENT | Facility: CLINIC | Age: 58
End: 2018-11-15

## 2018-11-15 DIAGNOSIS — Z94.2 LUNG REPLACED BY TRANSPLANT (H): ICD-10-CM

## 2018-11-15 PROCEDURE — 80197 ASSAY OF TACROLIMUS: CPT | Performed by: INTERNAL MEDICINE

## 2018-11-15 NOTE — TELEPHONE ENCOUNTER
FK level yesterday was 56.2, patient state she accidentally took her dose prior to lab draw. No dose changes and she'll repeat level tomorrow.

## 2018-11-16 LAB
TACROLIMUS BLD-MCNC: 16.4 UG/L (ref 5–15)
TME LAST DOSE: ABNORMAL H

## 2018-11-19 ENCOUNTER — RADIANT APPOINTMENT (OUTPATIENT)
Dept: CT IMAGING | Facility: CLINIC | Age: 58
End: 2018-11-19
Attending: INTERNAL MEDICINE
Payer: COMMERCIAL

## 2018-11-19 ENCOUNTER — TELEPHONE (OUTPATIENT)
Dept: TRANSPLANT | Facility: CLINIC | Age: 58
End: 2018-11-19

## 2018-11-19 ENCOUNTER — RESULTS ONLY (OUTPATIENT)
Dept: OTHER | Facility: CLINIC | Age: 58
End: 2018-11-19

## 2018-11-19 ENCOUNTER — OFFICE VISIT (OUTPATIENT)
Dept: TRANSPLANT | Facility: CLINIC | Age: 58
End: 2018-11-19
Attending: INTERNAL MEDICINE
Payer: COMMERCIAL

## 2018-11-19 ENCOUNTER — INFUSION THERAPY VISIT (OUTPATIENT)
Dept: INFUSION THERAPY | Facility: CLINIC | Age: 58
End: 2018-11-19
Attending: INTERNAL MEDICINE
Payer: COMMERCIAL

## 2018-11-19 ENCOUNTER — RADIANT APPOINTMENT (OUTPATIENT)
Dept: GENERAL RADIOLOGY | Facility: CLINIC | Age: 58
End: 2018-11-19
Payer: COMMERCIAL

## 2018-11-19 ENCOUNTER — ANESTHESIA EVENT (OUTPATIENT)
Dept: SURGERY | Facility: CLINIC | Age: 58
End: 2018-11-19
Payer: COMMERCIAL

## 2018-11-19 VITALS
SYSTOLIC BLOOD PRESSURE: 146 MMHG | DIASTOLIC BLOOD PRESSURE: 80 MMHG | RESPIRATION RATE: 20 BRPM | HEART RATE: 89 BPM | TEMPERATURE: 98.3 F

## 2018-11-19 VITALS
WEIGHT: 110.8 LBS | HEART RATE: 101 BPM | DIASTOLIC BLOOD PRESSURE: 80 MMHG | TEMPERATURE: 98.7 F | BODY MASS INDEX: 18.92 KG/M2 | HEIGHT: 64 IN | OXYGEN SATURATION: 97 % | SYSTOLIC BLOOD PRESSURE: 128 MMHG

## 2018-11-19 DIAGNOSIS — K59.09 OTHER CONSTIPATION: ICD-10-CM

## 2018-11-19 DIAGNOSIS — Z94.2 LUNG REPLACED BY TRANSPLANT (H): ICD-10-CM

## 2018-11-19 DIAGNOSIS — Z79.899 ENCOUNTER FOR LONG-TERM (CURRENT) USE OF HIGH-RISK MEDICATION: ICD-10-CM

## 2018-11-19 DIAGNOSIS — R11.2 INTRACTABLE VOMITING WITH NAUSEA, UNSPECIFIED VOMITING TYPE: ICD-10-CM

## 2018-11-19 DIAGNOSIS — Z94.2 LUNG TRANSPLANT RECIPIENT (H): ICD-10-CM

## 2018-11-19 DIAGNOSIS — Z94.2 LUNG TRANSPLANT RECIPIENT (H): Primary | ICD-10-CM

## 2018-11-19 DIAGNOSIS — N17.9 ACUTE KIDNEY INJURY (H): ICD-10-CM

## 2018-11-19 DIAGNOSIS — Z94.2 S/P LUNG TRANSPLANT (H): ICD-10-CM

## 2018-11-19 DIAGNOSIS — Z94.2 LUNG REPLACED BY TRANSPLANT (H): Primary | ICD-10-CM

## 2018-11-19 DIAGNOSIS — E86.0 DEHYDRATION: Primary | ICD-10-CM

## 2018-11-19 LAB
ANION GAP SERPL CALCULATED.3IONS-SCNC: 6 MMOL/L (ref 3–14)
BUN SERPL-MCNC: 20 MG/DL (ref 7–30)
CALCIUM SERPL-MCNC: 9.2 MG/DL (ref 8.5–10.1)
CHLORIDE SERPL-SCNC: 96 MMOL/L (ref 94–109)
CMV DNA SPEC NAA+PROBE-ACNC: NORMAL [IU]/ML
CMV DNA SPEC NAA+PROBE-LOG#: NORMAL {LOG_IU}/ML
CO2 SERPL-SCNC: 30 MMOL/L (ref 20–32)
CREAT SERPL-MCNC: 1.51 MG/DL (ref 0.52–1.04)
ERYTHROCYTE [DISTWIDTH] IN BLOOD BY AUTOMATED COUNT: 13.9 % (ref 10–15)
EXPTIME-PRE: 9.71 SEC
FEF2575-%PRED-PRE: 9 %
FEF2575-PRE: 0.23 L/SEC
FEF2575-PRED: 2.43 L/SEC
FEFMAX-%PRED-PRE: 18 %
FEFMAX-PRE: 1.16 L/SEC
FEFMAX-PRED: 6.42 L/SEC
FEV1-%PRED-PRE: 20 %
FEV1-PRE: 0.54 L
FEV1FEV6-PRE: 36 %
FEV1FEV6-PRED: 81 %
FEV1FVC-PRE: 32 %
FEV1FVC-PRED: 79 %
FIFMAX-PRE: 1.54 L/SEC
FVC-%PRED-PRE: 51 %
FVC-PRE: 1.67 L
FVC-PRED: 3.23 L
GFR SERPL CREATININE-BSD FRML MDRD: 36 ML/MIN/1.7M2
GLUCOSE SERPL-MCNC: 109 MG/DL (ref 70–99)
HCT VFR BLD AUTO: 25.5 % (ref 35–47)
HGB BLD-MCNC: 7.5 G/DL (ref 11.7–15.7)
INR PPP: 0.89 (ref 0.86–1.14)
MAGNESIUM SERPL-MCNC: 1.8 MG/DL (ref 1.6–2.3)
MCH RBC QN AUTO: 31.3 PG (ref 26.5–33)
MCHC RBC AUTO-ENTMCNC: 29.4 G/DL (ref 31.5–36.5)
MCV RBC AUTO: 106 FL (ref 78–100)
PLATELET # BLD AUTO: 606 10E9/L (ref 150–450)
POTASSIUM SERPL-SCNC: 4.7 MMOL/L (ref 3.4–5.3)
RBC # BLD AUTO: 2.4 10E12/L (ref 3.8–5.2)
SODIUM SERPL-SCNC: 133 MMOL/L (ref 133–144)
SPECIMEN SOURCE: NORMAL
TACROLIMUS BLD-MCNC: 20 UG/L (ref 5–15)
TME LAST DOSE: ABNORMAL H
WBC # BLD AUTO: 5.4 10E9/L (ref 4–11)

## 2018-11-19 PROCEDURE — 85027 COMPLETE CBC AUTOMATED: CPT | Performed by: INTERNAL MEDICINE

## 2018-11-19 PROCEDURE — 36415 COLL VENOUS BLD VENIPUNCTURE: CPT | Performed by: INTERNAL MEDICINE

## 2018-11-19 PROCEDURE — 96360 HYDRATION IV INFUSION INIT: CPT

## 2018-11-19 PROCEDURE — 85610 PROTHROMBIN TIME: CPT | Performed by: INTERNAL MEDICINE

## 2018-11-19 PROCEDURE — 86832 HLA CLASS I HIGH DEFIN QUAL: CPT | Performed by: INTERNAL MEDICINE

## 2018-11-19 PROCEDURE — G0463 HOSPITAL OUTPT CLINIC VISIT: HCPCS | Mod: ZF

## 2018-11-19 PROCEDURE — 86833 HLA CLASS II HIGH DEFIN QUAL: CPT | Performed by: INTERNAL MEDICINE

## 2018-11-19 PROCEDURE — 80048 BASIC METABOLIC PNL TOTAL CA: CPT | Performed by: INTERNAL MEDICINE

## 2018-11-19 PROCEDURE — 25000128 H RX IP 250 OP 636: Mod: ZF | Performed by: INTERNAL MEDICINE

## 2018-11-19 PROCEDURE — G0463 HOSPITAL OUTPT CLINIC VISIT: HCPCS | Mod: 25

## 2018-11-19 PROCEDURE — 80197 ASSAY OF TACROLIMUS: CPT | Performed by: INTERNAL MEDICINE

## 2018-11-19 PROCEDURE — 83735 ASSAY OF MAGNESIUM: CPT | Performed by: INTERNAL MEDICINE

## 2018-11-19 RX ORDER — TACROLIMUS 1 MG/1
CAPSULE ORAL
Qty: 210 CAPSULE | Refills: 11 | Status: CANCELLED | OUTPATIENT
Start: 2018-11-19

## 2018-11-19 RX ORDER — TACROLIMUS 0.5 MG/1
0.5 CAPSULE ORAL DAILY
Qty: 30 CAPSULE | Refills: 0 | Status: CANCELLED | OUTPATIENT
Start: 2018-11-19

## 2018-11-19 RX ADMIN — SODIUM CHLORIDE 1000 ML: 9 INJECTION, SOLUTION INTRAVENOUS at 12:38

## 2018-11-19 ASSESSMENT — PAIN SCALES - GENERAL: PAINLEVEL: NO PAIN (0)

## 2018-11-19 NOTE — MR AVS SNAPSHOT
After Visit Summary   11/19/2018    Kecia Blue    MRN: [id number]           Patient Information     Date Of Birth          1960        Visit Information        Provider Department      11/19/2018 4:00 PM UC 44 ATC; UC SPEC INFUSION [facility] Specialty [facility] Procedure        Today's Diagnoses     Dehydration    -  1    Acute kidney injury (H)           Follow-ups after your visit        Your next 10 appointments already scheduled     Nov 19, 2018  4:00 PM CST   Infusion 120 with UC SPEC INFUSION, UC 44 ATC   [facility] Procedure ([facility])    [address]  [address]  [place] [address] [phone]            Nov 20, 2018   Procedure with Wilber Lin MD   [facility], [place], Same Day Surgery (--)    [address] [address]   [phone]            Dec 05, 2018  9:00 AM CST   Lab with UC LAB   [facility] ([facility])    [address]  1st Floor  [place] [address] [phone]            Dec 05, 2018  9:15 AM CST   XR CHEST 2 VIEWS with UCXR1   [facility] [place] [facility] ([facility])    [address]  1st Floor  [place] [address] [phone]           How do I prepare for my exam? (Food and drink instructions) No Food and Drink Restrictions.  How do I prepare for my exam? (Other instructions) You do not need to do anything special for this exam.  What should I wear: Wear comfortable clothes.  How long does the exam take: Most scans take less than 5 minutes.  What should I bring: Bring a list of your medicines, including vitamins, minerals and over-the-counter drugs. It is safest to leave personal items at home.  Do I need a :  No  is needed.  What do I need to tell my doctor: Tell your doctor if there s any chance you are pregnant.  What should I do after the exam: No  restrictions, You may resume normal activities.  What is this test: An image of a specific body part shown in shades of black and white.  Who should I call with questions: If you have any questions, please call the Imaging Department where you will have your exam. Directions, parking instructions, and other information is available on our website, [email or web address].            Dec 05, 2018  9:30 AM CST   PFT VISIT with SUKI TEIXEIRA   [facility] Pulmonary Function Testing ([facility])    [address]  3rd Floor  [place] [facility] [address]   [phone]            Dec 05, 2018 10:20 AM CST   (Arrive by 10:05 AM)   Return Lung Transplant with JONI Up [facility] ([facility])    [address]  [address]  [place] [address]   [phone]            Dec 13, 2018  8:40 AM CST   (Arrive by 8:25 AM)   New Patient Visit with JONI Wray [facility] ([facility])    [address]  4th Floor  [facility] [id number] [phone]              Future tests that were ordered for you today     Open Future Orders        Priority Expected Expires Ordered    Basic metabolic panel Routine 12/10/2018 2/19/2019 11/19/2018    Magnesium Routine 12/10/2018 2/19/2019 11/19/2018    CBC with platelets Routine 12/10/2018 2/19/2019 11/19/2018    CMV DNA quantification Routine 12/10/2018 2/19/2019 11/19/2018    X-ray Chest 2 vws* Routine 12/10/2018 2/19/2019 11/19/2018    Spirometry, Breathing Capacity Routine 12/10/2018 2/19/2019 11/19/2018    Tacrolimus level Routine 12/10/2018 2/19/2019 11/19/2018            Who to contact     If you have questions or need follow up information about today's clinic visit or your schedule please contact [facility] directly at [phone].  Normal or non-critical lab  and imaging results will be communicated to you by [employer], letter or phone within 4 business days after the clinic has received the results. If you do not hear from us within 7 days, please contact the clinic through Reddit or phone. If you have a critical or abnormal lab result, we will notify you by phone as soon as possible.  Submit refill requests through Reddit or call your pharmacy and they will forward the refill request to us. Please allow 3 business days for your refill to be completed.          Additional Information About Your Visit        [employer] Information     [employer] gives you secure access to your electronic health record. If you see a primary care provider, you can also send messages to your care team and make appointments. If you have questions, please call your primary care clinic.  If you do not have a primary care provider, please call [phone] and they will assist you.        Care EveryWhere ID     This is your Care EveryWhere ID. This could be used by other organizations to access your [facility] medical records  [id number]        Your Vitals Were     Pulse Temperature Respirations Last Period          89 98.3  F (36.8  C) (Oral) 20 06/07/2014 (Exact Date)         Blood Pressure from Last 3 Encounters:   11/19/18 146/80   11/19/18 128/80   10/29/18 114/75    Weight from Last 3 Encounters:   11/19/18 50.3 kg (110 lb 12.8 oz)   10/29/18 50.6 kg (111 lb 8.8 oz)   10/29/18 49.9 kg (110 lb)              Today, you had the following     No orders found for display         Today's Medication Changes          These changes are accurate as of 11/19/18  2:41 PM.  If you have any questions, ask your nurse or doctor.               These medicines have changed or have updated prescriptions.        Dose/Directions    * predniSONE 2.5 MG tablet   Commonly known as:  DELTASONE   This may have changed:    - when to take this  - additional instructions   Used for:  Lung replaced by transplant (H)         Dose:  2.5 mg   Take 1 tablet (2.5 mg) by mouth daily 6/22/18             7.5                    7.5 7/20/18             7.5                    5 8/24/18             5                       5 9/21/18             5                       2.5   Quantity:  90 tablet   Refills:  1       * predniSONE 5 MG tablet   Commonly known as:  DELTASONE   This may have changed:    - how much to take  - how to take this  - when to take this  - additional instructions   Used for:  S/P lung transplant (H)        Follow taper card   Quantity:  180 tablet   Refills:  3       * Notice:  This list has 2 medication(s) that are the same as other medications prescribed for you. Read the directions carefully, and ask your doctor or other care provider to review them with you.      Stop taking these medicines if you haven't already. Please contact your care team if you have questions.     dronabinol 5 MG capsule   Commonly known as:  MARINOL   Stopped by:  Dean Riley MD           metoclopramide 5 MG tablet   Commonly known as:  REGLAN   Stopped by:  Dean Riley MD                    Primary Care Provider Office Phone # Fax #    Rosy JUAN Vu [phone]       [facility]  [address]  [facility] [address]        Equal Access to Services     ALBAN VALENCIA AH: Hadsonja fofanao Soto, waaxda luqadaha, qaybta kaalmada adeegyada, morro kelley. So [facility] [place] [facility] [phone].    ATENCIÓN: Si habla español, tiene a taylor disposición servicios gratuitos de asistencia lingüística. Llame al [phone].    We comply with applicable federal civil rights laws and [place] laws. We do not discriminate on the basis of race, color, national origin, age, disability, sex, sexual orientation, or gender identity.            Thank you!     Thank you for choosing [facility] SPECIAL[facility] PROCEDURE  for your care. Our goal is always to provide you with excellent care. Hearing  back from our patients is one way we can continue to improve our services. Please take a few minutes to complete the written survey that you may receive in the mail after your visit with us. Thank you!             Your Updated Medication List - Protect others around you: Learn how to safely use, store and throw away your medicines at www.disposemymeds.org.          This list is accurate as of 11/19/18  2:41 PM.  Always use your most recent med list.                   Brand Name Dispense Instructions for use Diagnosis    ACETAMINOPHEN PO      Take 1,000 mg by mouth every 6 hours as needed for pain        albuterol 108 (90 Base) MCG/ACT inhaler    PROAIR HFA/PROVENTIL HFA/VENTOLIN HFA    1 Inhaler    Inhale 2 puffs into the lungs every 6 hours as needed for shortness of breath / dyspnea or wheezing    SOB (shortness of breath), Wheezing       azaTHIOprine 50 MG tablet    IMURAN    90 tablet    Take 3 tablets (150 mg) by mouth every evening    Lung replaced by transplant (H)       calcium carbonate 600 mg-vitamin D 400 units 600-400 MG-UNIT per tablet    CALTRATE     Take 1 tablet by mouth daily    S/P lung transplant (H), ILD (interstitial lung disease) (H), Lung transplant recipient (H), Encounter for long-term (current) use of high-risk medication, Anemia, unspecified type, Cytomegalovirus (CMV) viremia (H)       guaiFENesin-codeine 100-10 MG/5ML Soln solution    ROBITUSSIN AC    120 mL    Take 5-10 mLs by mouth every 6 hours as needed for cough    Cough       magnesium oxide 400 MG tablet    MAG-OX    90 tablet    Take 1 tablet (400 mg) by mouth daily    Hypomagnesemia       metoprolol tartrate 25 MG tablet    LOPRESSOR    60 tablet    Take 1 tablet (25 mg) by mouth 2 times daily    Paroxysmal atrial fibrillation (H)       multivitamin, therapeutic with minerals Tabs tablet     30 each    Take 1 tablet by mouth daily    Lung transplant recipient (H)       ondansetron 4 MG tablet    ZOFRAN    60 tablet    Take 1  tablet (4 mg) by mouth every 12 hours as needed for nausea    Intractable vomiting with nausea, unspecified vomiting type       order for DME     1 Device    Equipment being ordered: Nebulizer    Status post lung transplantation (H)       order for DME     1 Device    Equipment being ordered: Nebulizer    Lung transplant recipient (H)       pantoprazole 40 MG EC tablet    PROTONIX    60 tablet    Take 1 tablet (40 mg) by mouth 2 times daily    Gastroesophageal reflux disease without esophagitis, ILD (interstitial lung disease) (H), Lung transplant recipient (H)       pentoxifylline 400 MG CR tablet    TRENtal    60 tablet    Take 1 tablet (400 mg) by mouth 2 times daily    Lung transplant recipient (H)       * predniSONE 2.5 MG tablet    DELTASONE    90 tablet    Take 1 tablet (2.5 mg) by mouth daily 6/22/18             7.5                    7.5 7/20/18             7.5                    5 8/24/18             5                       5 9/21/18             5                       2.5    Lung replaced by transplant (H)       * predniSONE 5 MG tablet    DELTASONE    180 tablet    Follow taper card    S/P lung transplant (H)       sulfamethoxazole-trimethoprim 800-160 MG per tablet    BACTRIM DS/SEPTRA DS    30 tablet    Take 1 tablet by mouth daily X one month.    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    S/P lung transplant (H)       * tacrolimus 1 MG capsule    GENERIC EQUIVALENT    240 capsule    Take 4 capsules (4 mg) by mouth 2 times daily Take 4 mg in the AM and 4 mg in the PM along with one 0.5mg for total dose 4.5mg am and 4.5mg pm.    S/P lung transplant (H), Other constipation       VALCYTE 450 MG tablet   Generic drug:  valGANciclovir     30 tablet    Take 450 mg by mouth daily    Cytomegalovirus (CMV)  viremia (H), ILD (interstitial lung disease) (H), Lung transplant recipient (H), Encounter for long-term (current) use of high-risk medication, Anemia, unspecified type, S/P lung transplant (H)       * Notice:  This list has 4 medication(s) that are the same as other medications prescribed for you. Read the directions carefully, and ask your doctor or other care provider to review them with you.

## 2018-11-19 NOTE — NURSING NOTE
Transplant Coordinator Note    Reason for visit: Post lung transplant follow up visit   Coordinator: Present   Caregiver:  Ranjan spouse    Health concerns addressed today:  1. Coughing, wheezing. Decreased PFTs  2. N/v resolved after starting   3. Poor appetite   4. 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     Activity:   Oxygen needs:   Pain management:   Diabetic management:     Pt Education: medications (use/dose/side effects), how/when to call coordinator, frequency of labs, s/s of infection/rejection, call prior to starting any new medications, lab/vital sign book     Labs, CXR, PFTs reviewed with patient  Medication record reviewed and reconciled  Questions and concerns addressed    Health Maintenance:         Patient Instructions  1. Tacrolimus level on 11/15 was 16.4. We will see what your tacrolimus level is today before making   2. IV fluids today     Next transplant clinic appointment:  TBD with CXR, labs and PFTs  Next lab draw: weekly      AVS printed at time of check out

## 2018-11-19 NOTE — TELEPHONE ENCOUNTER
Contacted Kecia with Tacrolimus level, dose change and next level. Changed in Epic.  Tacrolimus level 20.0 at 10 hours, on 11/19/18  Goal 9-11.   Current dose 4 mg in AM, 5 mg in PM    Dose changed to  3  mg in AM, 4 mg in PM   Recheck level in 7-10 days    Nurse to call patient with dose change and lab request.   [employer] message sent

## 2018-11-19 NOTE — TELEPHONE ENCOUNTER
Patient will get 1L IVF this afternoon in [facility] and she'll have OR bronch with Dr Lin tomorrow.

## 2018-11-19 NOTE — PROGRESS NOTES
Tacrolimus level 20.0 at 10 hours, on 11/19/18  Goal 9-11.   Current dose 4.5 mg in AM, 4.5 mg in PM    Dose changed to  3.5 mg in AM, 3.5 mg in PM   Recheck level in 7-10 days    Nurse to call patient with dose change and lab request.   [employer] message sent

## 2018-11-19 NOTE — LETTER
11/19/2018      RE: Kecia Blue  [address] [place]  [place] [address]       [facility]  Pulmonary Medicine/Lung Transplant  November 19, 2018           Today's visit note:     PATIENT PROFILE:  Kecia Blue is a 57-year-old female with history of dermatomyositis, seronegative rheumatoid arthritis, interstitial lung disease and pulmonary hypertension who is status post bilateral sequential lung transplant in 03/2018.  Prior to transplant she required emergent ECMO for acute right heart failure and acute on chronic hypoxic respiratory failure.  Her immediate post-transplant course was uncomplicated except for recurrent right-sided pleural effusions.  More recently, she has had significant narrowing of right bronchial anastomosis with significant granulation tissue blocking the mainstem.  She has required multiple dilations and stent placement.  She was last seen in our clinic approximately 3 weeks ago and comes in today for followup.  Her last stent replacement was done at the time of the last visit.      INTERVAL HISTORY:  The patient reports that for 1 week after her stent placement she was doing well from a respiratory standpoint, but then started experiencing significant wheezing and orthopnea.  She has coughing spells which are dry.  She used to have daily vomiting which was terminated with the coughing spells.  However, the vomiting has now resolved since her mycophenolate was changed to Imuran.  She denies any shortness of breath at rest but has significant limitation of her exertional capacity.  She does not do much during the day because of this dyspnea.  She denies any hemoptysis.      REVIEW OF SYSTEMS:   1.  She denies any recent fevers or night sweats.  She has chills which are at her baseline.  Her appetite remains poor, but has slightly improved compared to before.  She has been able to eat a little bit more.  Her weight is continuing to go down.   2.  She  "denies any upper airway congestion.   3.  She denies any chest pain, palpitations or lightheadedness.   4.  She denies any ongoing nausea or vomiting.  She does not have any bowel complaints.  She denies any abdominal cramping.  She does not have GERD.   5.  She denies any lower extremity edema or upper extremity tremors.   6.  She denies any bruises or rashes on her body.   7.  Her mood is intermittently depressed.   8.  She reports significant nocturia about 2-3 times a night.  She does have sleep onset and maintenance insomnia.   9.  Rest of the review of systems is negative except as noted above.      PULMONARY FUNCTION TESTS:  Her FEV1 is 0.5 liters, which is 20% predicted.  FVC is 1.6 liters, which is 51% predicted.  FEV1/FVC ratio is 32% predicted.  Both FEV1 and FVC are significantly lower compared to previous tests.  Overall, she has very severe airflow obstruction.      IMAGING:  The patient underwent chest x-ray does not show any infiltrates or effusions.  Right mainstem appears to be proximally migrated.  A chest CT without contrast was performed.  This showed mild bilateral bibasilar atelectasis.  No other infiltrates or effusions.  Right mainstem stent appears migrated proximally but possibly overhanging the left mainstem, although the left side appears good.  Narrowing of the right bronchus intermedius distal to the stent is noted.      PHYSICAL EXAMINATION:   /80  Pulse 101  Temp 98.7  F (37.1  C) (Oral)  Ht 1.626 m (5' 4.02\")  Wt 50.3 kg (110 lb 12.8 oz)  LMP 06/07/2014 (Exact Date)  SpO2 97%  BMI 19.01 kg/m2  GENERAL:  Pleasant female sitting comfortably, speaking in full sentences without any discomfort.  Audible wheezing noted continuously.  Appears pale.   EYES:  Significant pallor, no icterus.   HEENT:  Dry mucous membranes.   PULMONARY:  Normal intensity breath sounds bilaterally with wheezing both inspiratory and expiratory.  No other added sounds.   CARDIOVASCULAR:  S1, S2 " normal, no murmur, rubs or gallops.  Regular rate and rhythm.  Tachycardia noted.   ABDOMEN:  Soft, nontender, nondistended, normoactive bowel sounds.   MUSCULOSKELETAL:  No lower extremity edema, mild upper extremity tremors.   NEUROLOGIC:  Grossly intact.   PSYCHIATRIC:  Normal affect.      ASSESSMENT AND PLAN:  The patient is a 57-year-old female who is 8 months status post bilateral sequential lung transplant for ILD related to rheumatoid arthritis.  Her post-transplant course has been complicated by right main stem anastomosis stenosis requiring multiple dilation and stent placement.   1.  Bilateral lung transplant complicated by right airway stenosis.  The patient has a significant drop in her lung function, which is likely mechanical due to a malposition stent and blockage of right main stem anastomosis.  We will contact our interventional pulmonary colleagues for evaluation of need for rebronching and stent replacement and dilation.  The patient will continue on standard 3-drug immune suppression including tacrolimus with a goal of 10-15 nanograms/mL, mycophenolate and prednisone.  Her PRA from today is pending.   2.  Infectious Disease.  She remains on Bactrim for PCP prophylaxis.  The patient is intermediate risk for CMV.  She remains on prophylactic valganciclovir.  However, her recent CMV serum viral load has been low.  Previous bronchs have had a significantly elevated CMV level.  We will wait for the CMV level today and possibly on the next bronchoscopy and may consider discontinuing valganciclovir if this is negative.  The patient also had a high risk donor and will need repeat labs for infectious studies at 12 months.   3.  Recurrent vomiting.  This is likely due to CellCept toxicity and has now resolved with changing to Imuran.  We will discontinue Reglan that the patient is not currently taking.  We will also discontinue Marinol.   4.  Hypertension.  Appears well controlled with metoprolol, which  will be continued.   5.  Hypomagnesemia.  The patient will continue magnesium replacement.   6.  Dermatomyositis with Raynaud's phenomenon.  The patient will follow up with Rheumatology.   7.  History of a provoked left upper extremity DVT and right IJ clot.  The patient has completed 6 months of anticoagulation and her Coumadin has been discontinued.   8.  Chronic kidney disease, stage III.  The patient has acute on chronic kidney disease today, likely due to prerenal from dehydration.  The patient will receive 1 liter of normal saline today.   9.  Anemia.  The patient has chronic iron deficiency anemia as well as anemia of chronic disease.  Will continue to follow for now.      The patient will return to the clinic in approximately 1 month with repeat labs, PFTs and chest imaging.                Data:     I reviewed all resulted lab tests and imaging studies.    Results for orders placed or performed in visit on 11/19/18   General PFT Lab (Please always keep checked)   Result Value Ref Range    FVC-Pred 3.23 L    FVC-Pre 1.67 L    FVC-%Pred-Pre 51 %    FEV1-Pre 0.54 L    FEV1-%Pred-Pre 20 %    FEV1FVC-Pred 79 %    FEV1FVC-Pre 32 %    FEFMax-Pred 6.42 L/sec    FEFMax-Pre 1.16 L/sec    FEFMax-%Pred-Pre 18 %    FEF2575-Pred 2.43 L/sec    FEF2575-Pre 0.23 L/sec    JUH9970-%Pred-Pre 9 %    ExpTime-Pre 9.71 sec    FIFMax-Pre 1.54 L/sec    FEV1FEV6-Pred 81 %    FEV1FEV6-Pre 36 %              Medications:     Current Outpatient Prescriptions   Medication     ACETAMINOPHEN PO     albuterol (PROAIR HFA/PROVENTIL HFA/VENTOLIN HFA) 108 (90 Base) MCG/ACT Inhaler     azaTHIOprine (IMURAN) 50 MG tablet     calcium-vitamin D (CALTRATE) 600-400 MG-UNIT per tablet     guaiFENesin-codeine (ROBITUSSIN AC) 100-10 MG/5ML SOLN solution     magnesium oxide (MAG-OX) 400 MG tablet     metoprolol tartrate (LOPRESSOR) 25 MG tablet     multivitamin, therapeutic with minerals (THERA-VIT-M) TABS tablet     ondansetron (ZOFRAN) 4 MG tablet      order for DME     order for DME     pantoprazole (PROTONIX) 40 MG EC tablet     pentoxifylline (TRENTAL) 400 MG CR tablet     predniSONE (DELTASONE) 2.5 MG tablet     predniSONE (DELTASONE) 5 MG tablet     sulfamethoxazole-trimethoprim (BACTRIM DS/SEPTRA DS) 800-160 MG per tablet     tacrolimus (GENERIC EQUIVALENT) 0.5 MG capsule     tacrolimus (GENERIC EQUIVALENT) 1 MG capsule     valGANciclovir (VALCYTE) 450 MG tablet     No current facility-administered medications for this visit.             Past Medical and Surgical History:     Past Medical History:   Diagnosis Date     Antisynthetase syndrome (H) 2014     Chronic cough      Chronic infection     recent C diff  8/18     Dehydration 8/1/2018     Dermatomyositis (H)      Dysplasia of cervix, low grade (ESTRADA 1)      ILD (interstitial lung disease) (H)      Osteopenia      PONV (postoperative nausea and vomiting)      Pulmonary hypertension (H)      Raynaud's disease      Seronegative rheumatoid arthritis (H)      Past Surgical History:   Procedure Laterality Date     BRONCHOSCOPY (RIGID OR FLEXIBLE), DIAGNOSTIC N/A 4/10/2018    Procedure: COMBINED BRONCHOSCOPY (RIGID OR FLEXIBLE), LAVAGE;;  Surgeon: Mariposa Donohue MD;  Location: [facility]     BRONCHOSCOPY (RIGID OR FLEXIBLE), DILATE BRONCHUS / TRACHEA N/A 10/11/2018    Procedure: BRONCHOSCOPY (RIGID OR FLEXIBLE), DILATE BRONCHUS / TRACHEA;  Flexible And Rigid Bronchoscopy and Dilation;  Surgeon: Wilber Lin MD;  Location: [facility]     BRONCHOSCOPY FLEXIBLE N/A 3/13/2018    Procedure: BRONCHOSCOPY FLEXIBLE;  Flexible Bronchoscopy ;  Surgeon: Gissell Sanchez MD;  Location: [facility]     BRONCHOSCOPY FLEXIBLE N/A 5/9/2018    Procedure: BRONCHOSCOPY FLEXIBLE;;  Surgeon: Wilber Lin MD;  Location: [facility]     BRONCHOSCOPY FLEXIBLE AND RIGID N/A 9/10/2018    Procedure: BRONCHOSCOPY FLEXIBLE AND RIGID;  Flexible and Rigid Bronchoscopy with Balloon Dilation, tissue debulking with cryo, and Right  mainstem bronchus stent placement;  Surgeon: Wilber Lin MD;  Location: [facility]     BRONCHOSCOPY RIGID N/A 6/6/2018    Procedure: BRONCHOSCOPY RIGID;;  Surgeon: Lopez Macias MD;  Location: [facility]     BRONCHOSCOPY, DILATE BRONCHUS, STENT BRONCHUS, COMBINED N/A 6/11/2018    Procedure: COMBINED BRONCHOSCOPY, DILATE BRONCHUS, STENT BRONCHUS;  Flexible Bronchoscopy, Balloon Dilation, Bronchial Washings;  Surgeon: Wilber Lin MD;  Location: [facility]     BRONCHOSCOPY, DILATE BRONCHUS, STENT BRONCHUS, COMBINED Right 7/10/2018    Procedure: COMBINED BRONCHOSCOPY, DILATE BRONCHUS, STENT BRONCHUS;  Flexible Bronchoscopy, Balloon Dilation, Bronchial Washings  ;  Surgeon: Wilber Lin MD;  Location: [facility]     BRONCHOSCOPY, DILATE BRONCHUS, STENT BRONCHUS, COMBINED N/A 8/2/2018    Procedure: COMBINED BRONCHOSCOPY, DILATE BRONCHUS, STENT BRONCHUS;  Flexible Bronchoscopy, Bronchial Washings, Balloon Dilation;  Surgeon: Wilber Lin MD;  Location: [facility]     BRONCHOSCOPY, DILATE BRONCHUS, STENT BRONCHUS, COMBINED N/A 8/20/2018    Procedure: COMBINED BRONCHOSCOPY, DILATE BRONCHUS, STENT BRONCHUS;  Flexible Bronchoscopy, Balloon Dilation;  Surgeon: Wilber Lin MD;  Location: [facility]     BRONCHOSCOPY, DILATE BRONCHUS, STENT BRONCHUS, COMBINED N/A 10/29/2018    Procedure: Flexible Bronchoscopy, Balloon Dilation, Stent Revision, Airway Examination And Therapeutic Suctioning, Cyro Tumor Debulking;  Surgeon: Wilber Lin MD;  Location: [facility]     ENT SURGERY      tonsillectomy as a child     ESOPHAGOSCOPY, GASTROSCOPY, DUODENOSCOPY (EGD), COMBINED N/A 10/29/2018    Procedure: COMBINED ESOPHAGOSCOPY, GASTROSCOPY, DUODENOSCOPY (EGD) with biopsies and polypectomy;  Surgeon: Chente Bloom MD;  Location: [facility]     INSERT EXTRACORPORAL MEMBRANE OXYGENATOR ADULT (OUTSIDE OR) N/A 2/27/2018    Procedure: INSERT EXTRACORPORAL MEMBRANE OXYGENATOR ADULT (OUTSIDE OR);  INSERT  EXTRACORPORAL MEMBRANE OXYGENATOR ADULT (OUTSIDE OR) ;  Surgeon: Toby Hernandez MD;  Location: [facility]     no prior surgery       REMOVE EXTRACORPORAL MEMBRANE OXYGENATOR ADULT N/A 3/3/2018    Procedure: REMOVE EXTRACORPORAL MEMBRANE OXYGENATOR ADULT;  Removal of Right Femoral Venous and Right Axillary Arterial Extracorporeal Membrane Oxygenator;  Surgeon: Toby Hernandez MD;  [facility]     TRANSPLANT LUNG RECIPIENT SINGLE X2 Bilateral 3/1/2018    Procedure: TRANSPLANT LUNG RECIPIENT SINGLE X2;  Median Sternotomy, Extracorporeal Membrane Oxygenator, bilateral sequential lung transplant;  Surgeon: Toby Hernandez MD;  Location:  OR           Family History:     Family History   Problem Relation Age of Onset     Hypertension Mother      Arthritis Mother      Pancreatic Cancer Father      Diabetes Father             Social History:     Social History     Social History     Marital status:      Spouse name: N/A     Number of children: N/A     Years of education: N/A     Occupational History     Not on file.     Social History Main Topics     Smoking status: Never Smoker     Smokeless tobacco: Never Used     Alcohol use No     Drug use: No     Sexual activity: Not on file     Other Topics Concern     Parent/Sibling W/ Cabg, Mi Or Angioplasty Before 65f 55m? No     Social History Narrative       Dean Riley MD   of Medicine  Pulmonary, Critical Care and Sleep Medicine  [facility]  Pager: [phone]

## 2018-11-19 NOTE — MR AVS SNAPSHOT
After Visit Summary   11/19/2018    Kecia Blue    MRN: [id number]           Patient Information     Date Of Birth          1960        Visit Information        Provider Department      11/19/2018 9:00 AM Dean Riley MD [facility]        Today's Diagnoses     Lung replaced by transplant (H)    -  1      Care Instructions    Patient Instructions  1. Tacrolimus level on 11/15 was 16.4. We will see what your tacrolimus level is today before making   2. IV fluids today     Next transplant clinic appointment:  TBD with CXR, labs and PFTs  Next lab draw: weekly    ~~~~~~~~~~~~~~~~~~~~~~~~~    Thoracic Transplant Office phone [phone], fax [phone]    Office Hours 8:30 - 5:00     For after-hours urgent issues, please dial [phone], and ask to speak with the Thoracic Transplant Coordinator  On-Call, pager [phone].  --------------------  To expedite your medication refill(s), please contact your pharmacy and have them fax a refill request to: [phone]  .   *Please allow 3 business days for routine medication refills.  *Please allow 5 business days for controlled substance medication refills.    **For Diabetic medications and supplies refill(s), please contact your pharmacy and have them  Contact your Endocrine team.  --------------------  For scheduling appointments call [facility] :  [phone]. For lab appointments call [phone] or [facility].  --------------------  Please Note: If you are active on Piiku, all future test results will be sent by Piiku message only, and will no longer be called to patient. You may also receive communication directly from your physician.          Follow-ups after your visit        Your next 10 appointments already scheduled     Dec 05, 2018  9:00 AM CST   Lab with  LAB    [facility] Lab ([facility])    [address] [address] [phone]             Dec 05, 2018  9:15 AM CST   XR CHEST 2 VIEWS with UCXR1   [facility] ([facility])    [address] [facility]  [address] [facility] [address] [phone]           How do I prepare for my exam? (Food and drink instructions) No Food and Drink Restrictions.  How do I prepare for my exam? (Other instructions) You do not need to do anything special for this exam.  What should I wear: Wear comfortable clothes.  How long does the exam take: Most scans take less than 5 minutes.  What should I bring: Bring a list of your medicines, including vitamins, minerals and over-the-counter drugs. It is safest to leave personal items at home.  Do I need a :  No  is needed.  What do I need to tell my doctor: Tell your doctor if there s any chance you are pregnant.  What should I do after the exam: No restrictions, You may resume normal activities.  What is this test: An image of a specific body part shown in shades of black and white.  Who should I call with questions: If you have any questions, please call the Imaging Department where you will have your exam. Directions, parking instructions, and other information is available on our website, [email or web address].            Dec 05, 2018  9:30 AM CST   PFT VISIT with SUKI TEIXEIRA   [facility] Pulmonary Function Testing ([facility])    [address] [facility]  [address] [facility] [address]   [phone]            Dec 05, 2018 10:20 AM CST   (Arrive by 10:05 AM)   Return Lung Transplant with [facility] ([facility])    [address] [facility]  [address]  [facility] [address]   [phone]            Dec 06, 2018   Procedure with Wilber Lin MD   [facility], [place], Same Day Surgery (--)    [address] [facility] [address] [phone]            Dec 13, 2018  8:40 AM CST   (Arrive by 8:25 AM)   New Patient  Visit with Daron Cruz PA-C   [facility] ([facility])    [address] [facility]  4th Floor  [facility] [address] [phone]              Future tests that were ordered for you today     Open Future Orders        Priority Expected Expires Ordered    Basic metabolic panel Routine 12/10/2018 2/19/2019 11/19/2018    Magnesium Routine 12/10/2018 2/19/2019 11/19/2018    CBC with platelets Routine 12/10/2018 2/19/2019 11/19/2018    CMV DNA quantification Routine 12/10/2018 2/19/2019 11/19/2018    X-ray Chest 2 vws* Routine 12/10/2018 2/19/2019 11/19/2018    Spirometry, Breathing Capacity Routine 12/10/2018 2/19/2019 11/19/2018    Tacrolimus level Routine 12/10/2018 2/19/2019 11/19/2018            Who to contact     If you have questions or need follow up information about today's clinic visit or your schedule please contact [facility] directly at [phone].  Normal or non-critical lab and imaging results will be communicated to you by [employer], letter or phone within 4 business days after the clinic has received the results. If you do not hear from us within 7 days, please contact the clinic through K[employer] or phone. If you have a critical or abnormal lab result, we will notify you by phone as soon as possible.  Submit refill requests through VTEX or call your pharmacy and they will forward the refill request to us. Please allow 3 business days for your refill to be completed.          Additional Information About Your Visit        [employer] Information     [employer] gives you secure access to your electronic health record. If you see a primary care provider, you can also send messages to your care team and make appointments. If you have questions, please call your primary care clinic.  If you do not have a primary care provider, please call [phone] and they will assist you.        Care EveryWhere ID     This is your Care  "EveryWhere ID. This could be used by other organizations to access your [facility] medical records  [id number]        Your Vitals Were     Pulse Temperature Height Last Period Pulse Oximetry BMI (Body Mass Index)    101 98.7  F (37.1  C) (Oral) 1.626 m (5' 4.02\") 06/07/2014 (Exact Date) 97% 19.01 kg/m2       Blood Pressure from Last 3 Encounters:   11/20/18 120/85   11/19/18 146/80   11/19/18 128/80    Weight from Last 3 Encounters:   11/20/18 50 kg (110 lb 3.7 oz)   11/19/18 50.3 kg (110 lb 12.8 oz)   10/29/18 50.6 kg (111 lb 8.8 oz)                 Today's Medication Changes          These changes are accurate as of 11/19/18 11:59 PM.  If you have any questions, ask your nurse or doctor.               These medicines have changed or have updated prescriptions.        Dose/Directions    * predniSONE 2.5 MG tablet   Commonly known as:  DELTASONE   This may have changed:    - when to take this  - additional instructions   Used for:  Lung replaced by transplant (H)        Dose:  2.5 mg   Take 1 tablet (2.5 mg) by mouth daily 6/22/18             7.5                    7.5 7/20/18             7.5                    5 8/24/18             5                       5 9/21/18             5                       2.5   Quantity:  90 tablet   Refills:  1       * predniSONE 5 MG tablet   Commonly known as:  DELTASONE   This may have changed:    - how much to take  - how to take this  - when to take this  - additional instructions   Used for:  S/P lung transplant (H)        Follow taper card   Quantity:  180 tablet   Refills:  3       * Notice:  This list has 2 medication(s) that are the same as other medications prescribed for you. Read the directions carefully, and ask your doctor or other care provider to review them with you.      Stop taking these medicines if you haven't already. Please contact your care team if you have questions.     dronabinol 5 MG capsule   Commonly known as:  MARINOL   Stopped by:  Dean Riley MD    "        metoclopramide 5 MG tablet   Commonly known as:  REGLAN   Stopped by:  Dean Riley MD                    Primary Care Provider Office Phone # Fax #    Rosymanjit Vu [phone]       [facility]  [address]  [facility] [address]        Equal Access to Services     [facility]: Hadii aad ku hadasho Soomaali, waaxda luqadaha, qaybta kaalmada adeegyada, waxay idiin hayaan adeeg kharash la'aan . So [facility] [phone].    ATENCIÓN: Si habla español, tiene a taylor disposición servicios gratuitos de asistencia lingüística. Llame al [phone].    We comply with applicable federal civil rights laws and [place] laws. We do not discriminate on the basis of race, color, national origin, age, disability, sex, sexual orientation, or gender identity.            Thank you!     Thank you for choosing [facility]  for your care. Our goal is always to provide you with excellent care. Hearing back from our patients is one way we can continue to improve our services. Please take a few minutes to complete the written survey that you may receive in the mail after your visit with us. Thank you!             Your Updated Medication List - Protect others around you: Learn how to safely use, store and throw away your medicines at www.disposemymeds.org.          This list is accurate as of 11/19/18 11:59 PM.  Always use your most recent med list.                   Brand Name Dispense Instructions for use Diagnosis    ACETAMINOPHEN PO      Take 1,000 mg by mouth every 6 hours as needed for pain        albuterol 108 (90 Base) MCG/ACT inhaler    PROAIR HFA/PROVENTIL HFA/VENTOLIN HFA    1 Inhaler    Inhale 2 puffs into the lungs every 6 hours as needed for shortness of breath / dyspnea or wheezing    SOB (shortness of breath), Wheezing       azaTHIOprine 50 MG tablet    IMURAN    90 tablet    Take 3 tablets (150 mg) by mouth every evening    Lung replaced by transplant (H)        calcium carbonate 600 mg-vitamin D 400 units 600-400 MG-UNIT per tablet    CALTRATE     Take 1 tablet by mouth daily    S/P lung transplant (H), ILD (interstitial lung disease) (H), Lung transplant recipient (H), Encounter for long-term (current) use of high-risk medication, Anemia, unspecified type, Cytomegalovirus (CMV) viremia (H)       guaiFENesin-codeine 100-10 MG/5ML Soln solution    ROBITUSSIN AC    120 mL    Take 5-10 mLs by mouth every 6 hours as needed for cough    Cough       magnesium oxide 400 MG tablet    MAG-OX    90 tablet    Take 1 tablet (400 mg) by mouth daily    Hypomagnesemia       metoprolol tartrate 25 MG tablet    LOPRESSOR    60 tablet    Take 1 tablet (25 mg) by mouth 2 times daily    Paroxysmal atrial fibrillation (H)       multivitamin, therapeutic with minerals Tabs tablet     30 each    Take 1 tablet by mouth daily    Lung transplant recipient (H)       ondansetron 4 MG tablet    ZOFRAN    60 tablet    Take 1 tablet (4 mg) by mouth every 12 hours as needed for nausea    Intractable vomiting with nausea, unspecified vomiting type       order for DME     1 Device    Equipment being ordered: Nebulizer    Status post lung transplantation (H)       order for DME     1 Device    Equipment being ordered: Nebulizer    Lung transplant recipient (H)       pantoprazole 40 MG EC tablet    PROTONIX    60 tablet    Take 1 tablet (40 mg) by mouth 2 times daily    Gastroesophageal reflux disease without esophagitis, ILD (interstitial lung disease) (H), Lung transplant recipient (H)       pentoxifylline 400 MG CR tablet    TRENtal    60 tablet    Take 1 tablet (400 mg) by mouth 2 times daily    Lung transplant recipient (H)       * predniSONE 2.5 MG tablet    DELTASONE    90 tablet    Take 1 tablet (2.5 mg) by mouth daily 6/22/18             7.5                    7.5 7/20/18             7.5                    5 8/24/18             5                       5 9/21/18             5                        2.5    Lung replaced by transplant (H)       * predniSONE 5 MG tablet    DELTASONE    180 tablet    Follow taper card    S/P lung transplant (H)       sulfamethoxazole-trimethoprim 800-160 MG per tablet    BACTRIM DS/SEPTRA DS    30 tablet    Take 1 tablet by mouth daily X one month.    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    S/P lung transplant (H)       * tacrolimus 1 MG capsule    GENERIC EQUIVALENT    240 capsule    Take 4 capsules (4 mg) by mouth 2 times daily Take 4 mg in the AM and 4 mg in the PM along with one 0.5mg for total dose 4.5mg am and 4.5mg pm.    S/P lung transplant (H), Other constipation       VALCYTE 450 MG tablet   Generic drug:  valGANciclovir     30 tablet    Take 450 mg by mouth daily    Cytomegalovirus (CMV) viremia (H), ILD (interstitial lung disease) (H), Lung transplant recipient (H), Encounter for long-term (current) use of high-risk medication, Anemia, unspecified type, S/P lung transplant (H)       * Notice:  This list has 4 medication(s) that are the same as other medications prescribed for you. Read the directions carefully, and ask your doctor or other care provider to review them with you.

## 2018-11-19 NOTE — PATIENT INSTRUCTIONS
Patient Instructions  1. Tacrolimus level on 11/15 was 16.4. We will see what your tacrolimus level is today before making   2. IV fluids today     Next transplant clinic appointment:  TBD with CXR, labs and PFTs  Next lab draw: weekly    ~~~~~~~~~~~~~~~~~~~~~~~~~    Thoracic Transplant Office phone [phone], fax [phone]    Office Hours 8:30 - 5:00     For after-hours urgent issues, please dial [phone], and ask to speak with the Thoracic Transplant Coordinator  On-Call, pager [phone].  --------------------  To expedite your medication refill(s), please contact your pharmacy and have them fax a refill request to: [phone]  .   *Please allow 3 business days for routine medication refills.  *Please allow 5 business days for controlled substance medication refills.    **For Diabetic medications and supplies refill(s), please contact your pharmacy and have them  Contact your Endocrine team.  --------------------  For scheduling appointments call [facility] :  [phone]. For lab appointments call [phone] or [facility].  --------------------  Please Note: If you are active on Tech[employer]ia, all future test results will be sent by Techgenia message only, and will no longer be called to patient. You may also receive communication directly from your physician.

## 2018-11-19 NOTE — PROGRESS NOTES
[facility]  Pulmonary Medicine/Lung Transplant  November 19, 2018           Today's visit note:     PATIENT PROFILE:  Kecia Blue is a 57-year-old female with history of dermatomyositis, seronegative rheumatoid arthritis, interstitial lung disease and pulmonary hypertension who is status post bilateral sequential lung transplant in 03/2018.  Prior to transplant she required emergent ECMO for acute right heart failure and acute on chronic hypoxic respiratory failure.  Her immediate post-transplant course was uncomplicated except for recurrent right-sided pleural effusions.  More recently, she has had significant narrowing of right bronchial anastomosis with significant granulation tissue blocking the mainstem.  She has required multiple dilations and stent placement.  She was last seen in our clinic approximately 3 weeks ago and comes in today for followup.  Her last stent replacement was done at the time of the last visit.      INTERVAL HISTORY:  The patient reports that for 1 week after her stent placement she was doing well from a respiratory standpoint, but then started experiencing significant wheezing and orthopnea.  She has coughing spells which are dry.  She used to have daily vomiting which was terminated with the coughing spells.  However, the vomiting has now resolved since her mycophenolate was changed to Imuran.  She denies any shortness of breath at rest but has significant limitation of her exertional capacity.  She does not do much during the day because of this dyspnea.  She denies any hemoptysis.      REVIEW OF SYSTEMS:   1.  She denies any recent fevers or night sweats.  She has chills which are at her baseline.  Her appetite remains poor, but has slightly improved compared to before.  She has been able to eat a little bit more.  Her weight is continuing to go down.   2.  She denies any upper airway congestion.   3.  She denies any chest pain, palpitations or  "lightheadedness.   4.  She denies any ongoing nausea or vomiting.  She does not have any bowel complaints.  She denies any abdominal cramping.  She does not have GERD.   5.  She denies any lower extremity edema or upper extremity tremors.   6.  She denies any bruises or rashes on her body.   7.  Her mood is intermittently depressed.   8.  She reports significant nocturia about 2-3 times a night.  She does have sleep onset and maintenance insomnia.   9.  Rest of the review of systems is negative except as noted above.      PULMONARY FUNCTION TESTS:  Her FEV1 is 0.5 liters, which is 20% predicted.  FVC is 1.6 liters, which is 51% predicted.  FEV1/FVC ratio is 32% predicted.  Both FEV1 and FVC are significantly lower compared to previous tests.  Overall, she has very severe airflow obstruction.      IMAGING:  The patient underwent chest x-ray does not show any infiltrates or effusions.  Right mainstem appears to be proximally migrated.  A chest CT without contrast was performed.  This showed mild bilateral bibasilar atelectasis.  No other infiltrates or effusions.  Right mainstem stent appears migrated proximally but possibly overhanging the left mainstem, although the left side appears good.  Narrowing of the right bronchus intermedius distal to the stent is noted.      PHYSICAL EXAMINATION:   /80  Pulse 101  Temp 98.7  F (37.1  C) (Oral)  Ht 1.626 m (5' 4.02\")  Wt 50.3 kg (110 lb 12.8 oz)  LMP 06/07/2014 (Exact Date)  SpO2 97%  BMI 19.01 kg/m2  GENERAL:  Pleasant female sitting comfortably, speaking in full sentences without any discomfort.  Audible wheezing noted continuously.  Appears pale.   EYES:  Significant pallor, no icterus.   HEENT:  Dry mucous membranes.   PULMONARY:  Normal intensity breath sounds bilaterally with wheezing both inspiratory and expiratory.  No other added sounds.   CARDIOVASCULAR:  S1, S2 normal, no murmur, rubs or gallops.  Regular rate and rhythm.  Tachycardia noted. "   ABDOMEN:  Soft, nontender, nondistended, normoactive bowel sounds.   MUSCULOSKELETAL:  No lower extremity edema, mild upper extremity tremors.   NEUROLOGIC:  Grossly intact.   PSYCHIATRIC:  Normal affect.      ASSESSMENT AND PLAN:  The patient is a 57-year-old female who is 8 months status post bilateral sequential lung transplant for ILD related to rheumatoid arthritis.  Her post-transplant course has been complicated by right main stem anastomosis stenosis requiring multiple dilation and stent placement.   1.  Bilateral lung transplant complicated by right airway stenosis.  The patient has a significant drop in her lung function, which is likely mechanical due to a malposition stent and blockage of right main stem anastomosis.  We will contact our interventional pulmonary colleagues for evaluation of need for rebronching and stent replacement and dilation.  The patient will continue on standard 3-drug immune suppression including tacrolimus with a goal of 10-15 nanograms/mL, mycophenolate and prednisone.  Her PRA from today is pending.   2.  Infectious Disease.  She remains on Bactrim for PCP prophylaxis.  The patient is intermediate risk for CMV.  She remains on prophylactic valganciclovir.  However, her recent CMV serum viral load has been low.  Previous bronchs have had a significantly elevated CMV level.  We will wait for the CMV level today and possibly on the next bronchoscopy and may consider discontinuing valganciclovir if this is negative.  The patient also had a high risk donor and will need repeat labs for infectious studies at 12 months.   3.  Recurrent vomiting.  This is likely due to CellCept toxicity and has now resolved with changing to Imuran.  We will discontinue Reglan that the patient is not currently taking.  We will also discontinue Marinol.   4.  Hypertension.  Appears well controlled with metoprolol, which will be continued.   5.  Hypomagnesemia.  The patient will continue magnesium  replacement.   6.  Dermatomyositis with Raynaud's phenomenon.  The patient will follow up with Rheumatology.   7.  History of a provoked left upper extremity DVT and right IJ clot.  The patient has completed 6 months of anticoagulation and her Coumadin has been discontinued.   8.  Chronic kidney disease, stage III.  The patient has acute on chronic kidney disease today, likely due to prerenal from dehydration.  The patient will receive 1 liter of normal saline today.   9.  Anemia.  The patient has chronic iron deficiency anemia as well as anemia of chronic disease.  Will continue to follow for now.      The patient will return to the clinic in approximately 1 month with repeat labs, PFTs and chest imaging.                Data:     I reviewed all resulted lab tests and imaging studies.    Results for orders placed or performed in visit on 11/19/18   General PFT Lab (Please always keep checked)   Result Value Ref Range    FVC-Pred 3.23 L    FVC-Pre 1.67 L    FVC-%Pred-Pre 51 %    FEV1-Pre 0.54 L    FEV1-%Pred-Pre 20 %    FEV1FVC-Pred 79 %    FEV1FVC-Pre 32 %    FEFMax-Pred 6.42 L/sec    FEFMax-Pre 1.16 L/sec    FEFMax-%Pred-Pre 18 %    FEF2575-Pred 2.43 L/sec    FEF2575-Pre 0.23 L/sec    HDH6167-%Pred-Pre 9 %    ExpTime-Pre 9.71 sec    FIFMax-Pre 1.54 L/sec    FEV1FEV6-Pred 81 %    FEV1FEV6-Pre 36 %              Medications:     Current Outpatient Prescriptions   Medication     ACETAMINOPHEN PO     albuterol (PROAIR HFA/PROVENTIL HFA/VENTOLIN HFA) 108 (90 Base) MCG/ACT Inhaler     azaTHIOprine (IMURAN) 50 MG tablet     calcium-vitamin D (CALTRATE) 600-400 MG-UNIT per tablet     guaiFENesin-codeine (ROBITUSSIN AC) 100-10 MG/5ML SOLN solution     magnesium oxide (MAG-OX) 400 MG tablet     metoprolol tartrate (LOPRESSOR) 25 MG tablet     multivitamin, therapeutic with minerals (THERA-VIT-M) TABS tablet     ondansetron (ZOFRAN) 4 MG tablet     order for DME     order for DME     pantoprazole (PROTONIX) 40 MG EC tablet      pentoxifylline (TRENTAL) 400 MG CR tablet     predniSONE (DELTASONE) 2.5 MG tablet     predniSONE (DELTASONE) 5 MG tablet     sulfamethoxazole-trimethoprim (BACTRIM DS/SEPTRA DS) 800-160 MG per tablet     tacrolimus (GENERIC EQUIVALENT) 0.5 MG capsule     tacrolimus (GENERIC EQUIVALENT) 1 MG capsule     valGANciclovir (VALCYTE) 450 MG tablet     No current facility-administered medications for this visit.             Past Medical and Surgical History:     Past Medical History:   Diagnosis Date     Antisynthetase syndrome (H) 2014     Chronic cough      Chronic infection     recent C diff  8/18     Dehydration 8/1/2018     Dermatomyositis (H)      Dysplasia of cervix, low grade (ESTRADA 1)      ILD (interstitial lung disease) (H)      Osteopenia      PONV (postoperative nausea and vomiting)      Pulmonary hypertension (H)      Raynaud's disease      Seronegative rheumatoid arthritis (H)      Past Surgical History:   Procedure Laterality Date     BRONCHOSCOPY (RIGID OR FLEXIBLE), DIAGNOSTIC N/A 4/10/2018    Procedure: COMBINED BRONCHOSCOPY (RIGID OR FLEXIBLE), LAVAGE;;  Surgeon: Mariposa Donohue MD;  Location:  GI     BRONCHOSCOPY (RIGID OR FLEXIBLE), DILATE BRONCHUS / TRACHEA N/A 10/11/2018    Procedure: BRONCHOSCOPY (RIGID OR FLEXIBLE), DILATE BRONCHUS / TRACHEA;  Flexible And Rigid Bronchoscopy and Dilation;  Surgeon: Wilber Lin MD;  Location:  OR     BRONCHOSCOPY FLEXIBLE N/A 3/13/2018    Procedure: BRONCHOSCOPY FLEXIBLE;  Flexible Bronchoscopy ;  Surgeon: Gissell Sanchez MD;  Location:  GI     BRONCHOSCOPY FLEXIBLE N/A 5/9/2018    Procedure: BRONCHOSCOPY FLEXIBLE;;  Surgeon: Wilber Lin MD;  Location:  GI     BRONCHOSCOPY FLEXIBLE AND RIGID N/A 9/10/2018    Procedure: BRONCHOSCOPY FLEXIBLE AND RIGID;  Flexible and Rigid Bronchoscopy with Balloon Dilation, tissue debulking with cryo, and Right mainstem bronchus stent placement;  Surgeon: Wilber Lin MD;  Location:   OR     BRONCHOSCOPY RIGID N/A 6/6/2018    Procedure: BRONCHOSCOPY RIGID;;  Surgeon: Lopez Macias MD;  Location: [facility]     BRONCHOSCOPY, DILATE BRONCHUS, STENT BRONCHUS, COMBINED N/A 6/11/2018    Procedure: COMBINED BRONCHOSCOPY, DILATE BRONCHUS, STENT BRONCHUS;  Flexible Bronchoscopy, Balloon Dilation, Bronchial Washings;  Surgeon: Wilber Lin MD;  Location: [facility]     BRONCHOSCOPY, DILATE BRONCHUS, STENT BRONCHUS, COMBINED Right 7/10/2018    Procedure: COMBINED BRONCHOSCOPY, DILATE BRONCHUS, STENT BRONCHUS;  Flexible Bronchoscopy, Balloon Dilation, Bronchial Washings  ;  Surgeon: Wilber Lin MD;  Location: [facility]     BRONCHOSCOPY, DILATE BRONCHUS, STENT BRONCHUS, COMBINED N/A 8/2/2018    Procedure: COMBINED BRONCHOSCOPY, DILATE BRONCHUS, STENT BRONCHUS;  Flexible Bronchoscopy, Bronchial Washings, Balloon Dilation;  Surgeon: Wilber Lin MD;  Location: [facility]     BRONCHOSCOPY, DILATE BRONCHUS, STENT BRONCHUS, COMBINED N/A 8/20/2018    Procedure: COMBINED BRONCHOSCOPY, DILATE BRONCHUS, STENT BRONCHUS;  Flexible Bronchoscopy, Balloon Dilation;  Surgeon: Wilber Lin MD;  Location: [facility]     BRONCHOSCOPY, DILATE BRONCHUS, STENT BRONCHUS, COMBINED N/A 10/29/2018    Procedure: Flexible Bronchoscopy, Balloon Dilation, Stent Revision, Airway Examination And Therapeutic Suctioning, Cyro Tumor Debulking;  Surgeon: Wilber Lin MD;  Location: [facility]     ENT SURGERY      tonsillectomy as a child     ESOPHAGOSCOPY, GASTROSCOPY, DUODENOSCOPY (EGD), COMBINED N/A 10/29/2018    Procedure: COMBINED ESOPHAGOSCOPY, GASTROSCOPY, DUODENOSCOPY (EGD) with biopsies and polypectomy;  Surgeon: Chente Bloom MD;  Location: [facility]     INSERT EXTRACORPORAL MEMBRANE OXYGENATOR ADULT (OUTSIDE OR) N/A 2/27/2018    Procedure: INSERT EXTRACORPORAL MEMBRANE OXYGENATOR ADULT (OUTSIDE OR);  INSERT EXTRACORPORAL MEMBRANE OXYGENATOR ADULT (OUTSIDE OR) ;  Surgeon: Toby Hernandez  MD Jerry;  Location: [facility]     no prior surgery       REMOVE EXTRACORPORAL MEMBRANE OXYGENATOR ADULT N/A 3/3/2018    Procedure: REMOVE EXTRACORPORAL MEMBRANE OXYGENATOR ADULT;  Removal of Right Femoral Venous and Right Axillary Arterial Extracorporeal Membrane Oxygenator;  Surgeon: Toby Hernandez MD;  Location: [facility]     TRANSPLANT LUNG RECIPIENT SINGLE X2 Bilateral 3/1/2018    Procedure: TRANSPLANT LUNG RECIPIENT SINGLE X2;  Median Sternotomy, Extracorporeal Membrane Oxygenator, bilateral sequential lung transplant;  Surgeon: Toby Hernandez MD;  [facility]           Family History:     Family History   Problem Relation Age of Onset     Hypertension Mother      Arthritis Mother      Pancreatic Cancer Father      Diabetes Father             Social History:     Social History     Social History     Marital status:      Spouse name: N/A     Number of children: N/A     Years of education: N/A     Occupational History     Not on file.     Social History Main Topics     Smoking status: Never Smoker     Smokeless tobacco: Never Used     Alcohol use No     Drug use: No     Sexual activity: Not on file     Other Topics Concern     Parent/Sibling W/ Cabg, Mi Or Angioplasty Before 65f 55m? No     Social History Narrative       Dean Riley MD   of Medicine  Pulmonary, Critical Care and Sleep Medicine  [facility]  Pager: [phone]

## 2018-11-19 NOTE — PROGRESS NOTES
Nursing Note  Kecia Blue presents today to [facility] for:   Chief Complaint   Patient presents with     Infusion     1 liter NS     During today's [facility] appointment, orders from Dr. Riley were completed.  Frequency: as needed    Progress note:  Patient identification verified by name and date of birth.  Assessment completed.  Vitals recorded in Doc Flowsheets.  Patient was provided with education regarding infusion and possible side effects.  Patient verbalized understanding.      needed: No  Premedications: were not ordered.  Infusion Rates: given over 1 hour  Approximate Infusion length:1 hours.   Labs: were not ordered for this appointment.  Vascular access: peripheral IV placed today.  Treatment Conditions: non-applicable.  Patient tolerated infusion: well.    Drug Waste Record? No     Discharge Plan:   Follow up plan of care with: transplant coordinator.  Discharge instructions were reviewed with patient.  Patient/representative verbalized understanding of discharge instructions and all questions answered.  Patient discharged from [facility] in stable condition.    Krystal Barragan RN    Administrations This Visit     0.9% sodium chloride BOLUS     Admin Date Action Dose Rate Route Administered By          11/19/2018 New Bag 1000 mL 1,000 mL/hr Intravenous Krystal Barragan RN                         /80  Pulse 89  Temp 98.3  F (36.8  C) (Oral)  Resp 20  LMP 06/07/2014 (Exact Date)

## 2018-11-20 ENCOUNTER — SURGERY (OUTPATIENT)
Age: 58
End: 2018-11-20

## 2018-11-20 ENCOUNTER — ANESTHESIA (OUTPATIENT)
Dept: SURGERY | Facility: CLINIC | Age: 58
End: 2018-11-20
Payer: COMMERCIAL

## 2018-11-20 ENCOUNTER — APPOINTMENT (OUTPATIENT)
Dept: GENERAL RADIOLOGY | Facility: CLINIC | Age: 58
End: 2018-11-20
Attending: INTERNAL MEDICINE
Payer: COMMERCIAL

## 2018-11-20 ENCOUNTER — TELEPHONE (OUTPATIENT)
Dept: TRANSPLANT | Facility: CLINIC | Age: 58
End: 2018-11-20

## 2018-11-20 ENCOUNTER — HOSPITAL ENCOUNTER (OUTPATIENT)
Facility: CLINIC | Age: 58
Discharge: HOME | End: 2018-11-20
Attending: INTERNAL MEDICINE | Admitting: INTERNAL MEDICINE
Payer: COMMERCIAL

## 2018-11-20 VITALS
OXYGEN SATURATION: 99 % | BODY MASS INDEX: 18.82 KG/M2 | RESPIRATION RATE: 18 BRPM | SYSTOLIC BLOOD PRESSURE: 120 MMHG | DIASTOLIC BLOOD PRESSURE: 85 MMHG | WEIGHT: 110.23 LBS | TEMPERATURE: 98 F | HEIGHT: 64 IN

## 2018-11-20 DIAGNOSIS — J98.09 BRONCHIAL STENOSIS: Primary | ICD-10-CM

## 2018-11-20 DIAGNOSIS — Z94.2 LUNG REPLACED BY TRANSPLANT (H): Primary | ICD-10-CM

## 2018-11-20 LAB
APPEARANCE FLD: NORMAL
COLOR FLD: NORMAL
COPATH REPORT: NORMAL
GLUCOSE BLDC GLUCOMTR-MCNC: 120 MG/DL (ref 70–99)
GRAM STN SPEC: ABNORMAL
GRAM STN SPEC: ABNORMAL
KOH PREP SPEC: NORMAL
LYMPHOCYTES NFR FLD MANUAL: 6 %
MONOS+MACROS NFR FLD MANUAL: 4 %
NEUTS BAND NFR FLD MANUAL: 90 %
SPECIMEN SOURCE FLD: NORMAL
SPECIMEN SOURCE: ABNORMAL
SPECIMEN SOURCE: NORMAL
WBC # FLD AUTO: 2625 /UL

## 2018-11-20 PROCEDURE — 87206 SMEAR FLUORESCENT/ACID STAI: CPT | Performed by: INTERNAL MEDICINE

## 2018-11-20 PROCEDURE — 25000125 ZZHC RX 250: Performed by: ANESTHESIOLOGY

## 2018-11-20 PROCEDURE — 71000014 ZZH RECOVERY PHASE 1 LEVEL 2 FIRST HR: Performed by: INTERNAL MEDICINE

## 2018-11-20 PROCEDURE — 27210794 ZZH OR GENERAL SUPPLY STERILE: Performed by: INTERNAL MEDICINE

## 2018-11-20 PROCEDURE — 87077 CULTURE AEROBIC IDENTIFY: CPT | Performed by: INTERNAL MEDICINE

## 2018-11-20 PROCEDURE — 25000128 H RX IP 250 OP 636: Performed by: ANESTHESIOLOGY

## 2018-11-20 PROCEDURE — 87070 CULTURE OTHR SPECIMN AEROBIC: CPT | Performed by: INTERNAL MEDICINE

## 2018-11-20 PROCEDURE — 88108 CYTOPATH CONCENTRATE TECH: CPT | Performed by: INTERNAL MEDICINE

## 2018-11-20 PROCEDURE — 87210 SMEAR WET MOUNT SALINE/INK: CPT | Performed by: INTERNAL MEDICINE

## 2018-11-20 PROCEDURE — 87081 CULTURE SCREEN ONLY: CPT | Performed by: INTERNAL MEDICINE

## 2018-11-20 PROCEDURE — 71000015 ZZH RECOVERY PHASE 1 LEVEL 2 EA ADDTL HR: Performed by: INTERNAL MEDICINE

## 2018-11-20 PROCEDURE — 87181 SC STD AGAR DILUTION PER AGT: CPT | Performed by: INTERNAL MEDICINE

## 2018-11-20 PROCEDURE — 87205 SMEAR GRAM STAIN: CPT | Performed by: INTERNAL MEDICINE

## 2018-11-20 PROCEDURE — 25000128 H RX IP 250 OP 636: Performed by: NURSE ANESTHETIST, CERTIFIED REGISTERED

## 2018-11-20 PROCEDURE — 87015 SPECIMEN INFECT AGNT CONCNTJ: CPT | Performed by: INTERNAL MEDICINE

## 2018-11-20 PROCEDURE — 87633 RESP VIRUS 12-25 TARGETS: CPT | Performed by: INTERNAL MEDICINE

## 2018-11-20 PROCEDURE — 87102 FUNGUS ISOLATION CULTURE: CPT | Performed by: INTERNAL MEDICINE

## 2018-11-20 PROCEDURE — 40000278 XR SURGERY CARM FLUORO LESS THAN 5 MIN: Mod: TC

## 2018-11-20 PROCEDURE — 87186 SC STD MICRODIL/AGAR DIL: CPT | Performed by: INTERNAL MEDICINE

## 2018-11-20 PROCEDURE — 40000170 ZZH STATISTIC PRE-PROCEDURE ASSESSMENT II: Performed by: INTERNAL MEDICINE

## 2018-11-20 PROCEDURE — 87185 SC STD ENZYME DETCJ PER NZM: CPT | Performed by: INTERNAL MEDICINE

## 2018-11-20 PROCEDURE — 87116 MYCOBACTERIA CULTURE: CPT | Performed by: INTERNAL MEDICINE

## 2018-11-20 PROCEDURE — 36000059 ZZH SURGERY LEVEL 3 EA 15 ADDTL MIN UMMC: Performed by: INTERNAL MEDICINE

## 2018-11-20 PROCEDURE — 71000027 ZZH RECOVERY PHASE 2 EACH 15 MINS: Performed by: INTERNAL MEDICINE

## 2018-11-20 PROCEDURE — 86023 IMMUNOGLOBULIN ASSAY: CPT | Performed by: INTERNAL MEDICINE

## 2018-11-20 PROCEDURE — 37000008 ZZH ANESTHESIA TECHNICAL FEE, 1ST 30 MIN: Performed by: INTERNAL MEDICINE

## 2018-11-20 PROCEDURE — 37000009 ZZH ANESTHESIA TECHNICAL FEE, EACH ADDTL 15 MIN: Performed by: INTERNAL MEDICINE

## 2018-11-20 PROCEDURE — 36000057 ZZH SURGERY LEVEL 3 1ST 30 MIN - UMMC: Performed by: INTERNAL MEDICINE

## 2018-11-20 PROCEDURE — 88312 SPECIAL STAINS GROUP 1: CPT | Performed by: INTERNAL MEDICINE

## 2018-11-20 PROCEDURE — 25000125 ZZHC RX 250: Performed by: NURSE ANESTHETIST, CERTIFIED REGISTERED

## 2018-11-20 PROCEDURE — 25000128 H RX IP 250 OP 636: Performed by: INTERNAL MEDICINE

## 2018-11-20 PROCEDURE — 82962 GLUCOSE BLOOD TEST: CPT

## 2018-11-20 PROCEDURE — 89051 BODY FLUID CELL COUNT: CPT | Performed by: INTERNAL MEDICINE

## 2018-11-20 PROCEDURE — C1726 CATH, BAL DIL, NON-VASCULAR: HCPCS | Performed by: INTERNAL MEDICINE

## 2018-11-20 RX ORDER — NALOXONE HYDROCHLORIDE 0.4 MG/ML
.1-.4 INJECTION, SOLUTION INTRAMUSCULAR; INTRAVENOUS; SUBCUTANEOUS
Status: DISCONTINUED | OUTPATIENT
Start: 2018-11-20 | End: 2018-11-20 | Stop reason: HOSPADM

## 2018-11-20 RX ORDER — SODIUM CHLORIDE, SODIUM LACTATE, POTASSIUM CHLORIDE, CALCIUM CHLORIDE 600; 310; 30; 20 MG/100ML; MG/100ML; MG/100ML; MG/100ML
INJECTION, SOLUTION INTRAVENOUS CONTINUOUS
Status: DISCONTINUED | OUTPATIENT
Start: 2018-11-20 | End: 2018-11-20 | Stop reason: HOSPADM

## 2018-11-20 RX ORDER — ALBUTEROL SULFATE 0.83 MG/ML
2.5 SOLUTION RESPIRATORY (INHALATION) EVERY 4 HOURS PRN
Status: DISCONTINUED | OUTPATIENT
Start: 2018-11-20 | End: 2018-11-20 | Stop reason: HOSPADM

## 2018-11-20 RX ORDER — LIDOCAINE 40 MG/G
CREAM TOPICAL
Status: DISCONTINUED | OUTPATIENT
Start: 2018-11-20 | End: 2018-11-20 | Stop reason: HOSPADM

## 2018-11-20 RX ORDER — FENTANYL CITRATE 50 UG/ML
25-50 INJECTION, SOLUTION INTRAMUSCULAR; INTRAVENOUS
Status: DISCONTINUED | OUTPATIENT
Start: 2018-11-20 | End: 2018-11-20 | Stop reason: HOSPADM

## 2018-11-20 RX ORDER — PROPOFOL 10 MG/ML
INJECTION, EMULSION INTRAVENOUS PRN
Status: DISCONTINUED | OUTPATIENT
Start: 2018-11-20 | End: 2018-11-20

## 2018-11-20 RX ORDER — HYDROMORPHONE HYDROCHLORIDE 1 MG/ML
.3-.5 INJECTION, SOLUTION INTRAMUSCULAR; INTRAVENOUS; SUBCUTANEOUS EVERY 10 MIN PRN
Status: DISCONTINUED | OUTPATIENT
Start: 2018-11-20 | End: 2018-11-20 | Stop reason: HOSPADM

## 2018-11-20 RX ORDER — ONDANSETRON 2 MG/ML
INJECTION INTRAMUSCULAR; INTRAVENOUS PRN
Status: DISCONTINUED | OUTPATIENT
Start: 2018-11-20 | End: 2018-11-20

## 2018-11-20 RX ORDER — SODIUM CHLORIDE, SODIUM LACTATE, POTASSIUM CHLORIDE, CALCIUM CHLORIDE 600; 310; 30; 20 MG/100ML; MG/100ML; MG/100ML; MG/100ML
INJECTION, SOLUTION INTRAVENOUS CONTINUOUS PRN
Status: DISCONTINUED | OUTPATIENT
Start: 2018-11-20 | End: 2018-11-20

## 2018-11-20 RX ORDER — LIDOCAINE HYDROCHLORIDE 20 MG/ML
INJECTION, SOLUTION INFILTRATION; PERINEURAL PRN
Status: DISCONTINUED | OUTPATIENT
Start: 2018-11-20 | End: 2018-11-20

## 2018-11-20 RX ORDER — ONDANSETRON 4 MG/1
4 TABLET, ORALLY DISINTEGRATING ORAL EVERY 30 MIN PRN
Status: DISCONTINUED | OUTPATIENT
Start: 2018-11-20 | End: 2018-11-20 | Stop reason: HOSPADM

## 2018-11-20 RX ORDER — MEPERIDINE HYDROCHLORIDE 50 MG/ML
12.5 INJECTION INTRAMUSCULAR; INTRAVENOUS; SUBCUTANEOUS
Status: DISCONTINUED | OUTPATIENT
Start: 2018-11-20 | End: 2018-11-20 | Stop reason: HOSPADM

## 2018-11-20 RX ORDER — FENTANYL CITRATE 50 UG/ML
INJECTION, SOLUTION INTRAMUSCULAR; INTRAVENOUS PRN
Status: DISCONTINUED | OUTPATIENT
Start: 2018-11-20 | End: 2018-11-20

## 2018-11-20 RX ORDER — PROPOFOL 10 MG/ML
INJECTION, EMULSION INTRAVENOUS CONTINUOUS PRN
Status: DISCONTINUED | OUTPATIENT
Start: 2018-11-20 | End: 2018-11-20

## 2018-11-20 RX ORDER — ONDANSETRON 2 MG/ML
4 INJECTION INTRAMUSCULAR; INTRAVENOUS EVERY 30 MIN PRN
Status: DISCONTINUED | OUTPATIENT
Start: 2018-11-20 | End: 2018-11-20 | Stop reason: HOSPADM

## 2018-11-20 RX ORDER — DEXAMETHASONE SODIUM PHOSPHATE 4 MG/ML
INJECTION, SOLUTION INTRA-ARTICULAR; INTRALESIONAL; INTRAMUSCULAR; INTRAVENOUS; SOFT TISSUE PRN
Status: DISCONTINUED | OUTPATIENT
Start: 2018-11-20 | End: 2018-11-20

## 2018-11-20 RX ORDER — PREDNISONE 20 MG/1
20 TABLET ORAL DAILY
Qty: 5 TABLET | Refills: 0 | Status: SHIPPED | OUTPATIENT
Start: 2018-11-20 | End: 2018-12-13

## 2018-11-20 RX ADMIN — PROPOFOL 120 MG: 10 INJECTION, EMULSION INTRAVENOUS at 08:47

## 2018-11-20 RX ADMIN — SUGAMMADEX 125 MG: 100 INJECTION, SOLUTION INTRAVENOUS at 09:16

## 2018-11-20 RX ADMIN — DEXAMETHASONE SODIUM PHOSPHATE 8 MG: 4 INJECTION, SOLUTION INTRA-ARTICULAR; INTRALESIONAL; INTRAMUSCULAR; INTRAVENOUS; SOFT TISSUE at 08:53

## 2018-11-20 RX ADMIN — EPINEPHRINE 15 ML: 1 INJECTION PARENTERAL at 09:11

## 2018-11-20 RX ADMIN — ALBUTEROL SULFATE 2.5 MG: 2.5 SOLUTION RESPIRATORY (INHALATION) at 10:12

## 2018-11-20 RX ADMIN — ROCURONIUM BROMIDE 40 MG: 10 INJECTION INTRAVENOUS at 08:47

## 2018-11-20 RX ADMIN — LIDOCAINE HYDROCHLORIDE 3 ML: 40 INJECTION, SOLUTION RETROBULBAR; TOPICAL at 09:56

## 2018-11-20 RX ADMIN — MIDAZOLAM 1 MG: 1 INJECTION INTRAMUSCULAR; INTRAVENOUS at 08:30

## 2018-11-20 RX ADMIN — FENTANYL CITRATE 50 MCG: 50 INJECTION, SOLUTION INTRAMUSCULAR; INTRAVENOUS at 08:46

## 2018-11-20 RX ADMIN — FENTANYL CITRATE 25 MCG: 50 INJECTION, SOLUTION INTRAMUSCULAR; INTRAVENOUS at 10:06

## 2018-11-20 RX ADMIN — MIDAZOLAM 1 MG: 1 INJECTION INTRAMUSCULAR; INTRAVENOUS at 08:40

## 2018-11-20 RX ADMIN — SODIUM CHLORIDE, POTASSIUM CHLORIDE, SODIUM LACTATE AND CALCIUM CHLORIDE: 600; 310; 30; 20 INJECTION, SOLUTION INTRAVENOUS at 08:30

## 2018-11-20 RX ADMIN — PROPOFOL 175 MCG/KG/MIN: 10 INJECTION, EMULSION INTRAVENOUS at 08:47

## 2018-11-20 RX ADMIN — LIDOCAINE HYDROCHLORIDE 100 MG: 20 INJECTION, SOLUTION INFILTRATION; PERINEURAL at 08:46

## 2018-11-20 RX ADMIN — ONDANSETRON 4 MG: 2 INJECTION INTRAMUSCULAR; INTRAVENOUS at 09:14

## 2018-11-20 NOTE — ANESTHESIA PREPROCEDURE EVALUATION
Anesthesia Pre-Procedure Evaluation    Patient: Kecia Blue   MRN:     [id number] Gender:   female   Age:    57 year old :      1960        Preoperative Diagnosis: Bronchial Stenosis    Procedure(s):  Flexible Bronchoscopy, Stent Revision     Past Medical History:   Diagnosis Date     Antisynthetase syndrome (H)      Chronic cough      Chronic infection     recent C diff       Dehydration 2018     Dermatomyositis (H)      Dysplasia of cervix, low grade (ESTRADA 1)      ILD (interstitial lung disease) (H)      Osteopenia      PONV (postoperative nausea and vomiting)      Pulmonary hypertension (H)      Raynaud's disease      Seronegative rheumatoid arthritis (H)       Past Surgical History:   Procedure Laterality Date     BRONCHOSCOPY (RIGID OR FLEXIBLE), DIAGNOSTIC N/A 4/10/2018    Procedure: COMBINED BRONCHOSCOPY (RIGID OR FLEXIBLE), LAVAGE;;  Surgeon: Mariposa Donohue MD;  Location: [facility]     BRONCHOSCOPY (RIGID OR FLEXIBLE), DILATE BRONCHUS / TRACHEA N/A 10/11/2018    Procedure: BRONCHOSCOPY (RIGID OR FLEXIBLE), DILATE BRONCHUS / TRACHEA;  Flexible And Rigid Bronchoscopy and Dilation;  Surgeon: Wilber Lin MD;  [facility]     BRONCHOSCOPY FLEXIBLE N/A 3/13/2018    Procedure: BRONCHOSCOPY FLEXIBLE;  Flexible Bronchoscopy ;  Surgeon: Gissell Sanchez MD;  Location: [facility]     BRONCHOSCOPY FLEXIBLE N/A 2018    Procedure: BRONCHOSCOPY FLEXIBLE;;  Surgeon: Wilber Lin MD;  [facility]: [facility]     BRONCHOSCOPY FLEXIBLE AND RIGID N/A 9/10/2018    Procedure: BRONCHOSCOPY FLEXIBLE AND RIGID;  Flexible and Rigid Bronchoscopy with Balloon Dilation, tissue debulking with cryo, and Right mainstem bronchus stent placement;  Surgeon: Wilber Lin MD;  [facility]     BRONCHOSCOPY RIGID N/A 2018    Procedure: BRONCHOSCOPY RIGID;;  Surgeon: Lopez Macias MD;  [facility]: [facility]     BRONCHOSCOPY, DILATE BRONCHUS, STENT BRONCHUS, COMBINED N/A  6/11/2018    Procedure: COMBINED BRONCHOSCOPY, DILATE BRONCHUS, STENT BRONCHUS;  Flexible Bronchoscopy, Balloon Dilation, Bronchial Washings;  Surgeon: Wilber Lin MD;  [facility]     BRONCHOSCOPY, DILATE BRONCHUS, STENT BRONCHUS, COMBINED Right 7/10/2018    Procedure: COMBINED BRONCHOSCOPY, DILATE BRONCHUS, STENT BRONCHUS;  Flexible Bronchoscopy, Balloon Dilation, Bronchial Washings  ;  Surgeon: Wilber Lin MD;  [facility]     BRONCHOSCOPY, DILATE BRONCHUS, STENT BRONCHUS, COMBINED N/A 8/2/2018    Procedure: COMBINED BRONCHOSCOPY, DILATE BRONCHUS, STENT BRONCHUS;  Flexible Bronchoscopy, Bronchial Washings, Balloon Dilation;  Surgeon: Wilber Lin MD;  [facility]     BRONCHOSCOPY, DILATE BRONCHUS, STENT BRONCHUS, COMBINED N/A 8/20/2018    Procedure: COMBINED BRONCHOSCOPY, DILATE BRONCHUS, STENT BRONCHUS;  Flexible Bronchoscopy, Balloon Dilation;  Surgeon: Wilber Lin MD;  [facility]     BRONCHOSCOPY, DILATE BRONCHUS, STENT BRONCHUS, COMBINED N/A 10/29/2018    Procedure: Flexible Bronchoscopy, Balloon Dilation, Stent Revision, Airway Examination And Therapeutic Suctioning, Cyro Tumor Debulking;  Surgeon: Wilber Lin MD;  Location:  OR     ENT SURGERY      tonsillectomy as a child     ESOPHAGOSCOPY, GASTROSCOPY, DUODENOSCOPY (EGD), COMBINED N/A 10/29/2018    Procedure: COMBINED ESOPHAGOSCOPY, GASTROSCOPY, DUODENOSCOPY (EGD) with biopsies and polypectomy;  Surgeon: Chente Bloom MD;  [facility]     INSERT EXTRACORPORAL MEMBRANE OXYGENATOR ADULT (OUTSIDE OR) N/A 2/27/2018    Procedure: INSERT EXTRACORPORAL MEMBRANE OXYGENATOR ADULT (OUTSIDE OR);  INSERT EXTRACORPORAL MEMBRANE OXYGENATOR ADULT (OUTSIDE OR) ;  Surgeon: Toby Hernandez MD;  [facility]     no prior surgery       REMOVE EXTRACORPORAL MEMBRANE OXYGENATOR ADULT N/A 3/3/2018    Procedure: REMOVE EXTRACORPORAL MEMBRANE OXYGENATOR ADULT;  Removal of Right  Femoral Venous and Right Axillary Arterial Extracorporeal Membrane Oxygenator;  Surgeon: Toby Hernandez MD;  [facility]     TRANSPLANT LUNG RECIPIENT SINGLE X2 Bilateral 3/1/2018    Procedure: TRANSPLANT LUNG RECIPIENT SINGLE X2;  Median Sternotomy, Extracorporeal Membrane Oxygenator, bilateral sequential lung transplant;  Surgeon: Toby Hernandez MD;  [facility]          Anesthesia Evaluation     . Pt has had prior anesthetic.     - PONV        ROS/MED HX    ENT/Pulmonary:     (+), . Other pulmonary disease h/o Lung transplant for ILD complicated with bronchial anastomosis stenosis.    Neurologic:       Cardiovascular:     (+) ----. : . . . :. . pulmonary hypertension, Previous cardiac testing Echodate:03/2018results:Global and regional left ventricular function is hyperkinetic with an EF of 65-70%. RV size and function normal.date: results: date: results: date: results:          METS/Exercise Tolerance:  1 - Eating, dressing   Hematologic:     (+) Anemia, -      Musculoskeletal: Comment: Raynaud syndrome.        GI/Hepatic:     (+) GERD       Renal/Genitourinary:     (+) chronic renal disease, type: CRI, Pt has no history of transplant,       Endo:  - neg endo ROS       Psychiatric:         Infectious Disease:  - neg infectious disease ROS       Malignancy:      - no malignancy   Other:    - neg other ROS                     PHYSICAL EXAM:   Mental Status/Neuro: A/A/O   Airway: Facies: Feasible  Mallampati: II  Mouth/Opening: Full  TM distance: > 6 cm  Neck ROM: Not Assessed   Respiratory: Auscultation: CTAB     Resp. Rate: Normal     Resp. Effort: Normal      CV: Rhythm: Regular  Rate: Age appropriate  Heart: Normal Sounds   Comments:      Dental: Normal                  Lab Results   Component Value Date    WBC 5.4 11/19/2018    HGB 7.5 (L) 11/19/2018    HCT 25.5 (L) 11/19/2018     (H) 11/19/2018     11/19/2018    POTASSIUM 4.7 11/19/2018    CHLORIDE 96 11/19/2018     "CO2 30 11/19/2018    BUN 20 11/19/2018    CR 1.51 (H) 11/19/2018     (H) 11/19/2018    CHELSEY 9.2 11/19/2018    PHOS 2.6 08/03/2018    MAG 1.8 11/19/2018    ALBUMIN 2.9 (L) 10/29/2018    PROTTOTAL 6.9 10/29/2018    ALT 11 10/29/2018    AST 10 10/29/2018    ALKPHOS 73 10/29/2018    BILITOTAL 0.3 10/29/2018    AMYLASE 58 02/19/2018    PTT 26 08/03/2018    INR 0.89 11/19/2018    FIBR 279 03/05/2018    TSH 1.80 08/01/2018       Preop Vitals  BP Readings from Last 3 Encounters:   11/19/18 146/80   11/19/18 128/80   10/29/18 114/75    Pulse Readings from Last 3 Encounters:   11/19/18 89   11/19/18 101   10/29/18 84      Resp Readings from Last 3 Encounters:   11/19/18 20   10/29/18 18   10/29/18 20    SpO2 Readings from Last 3 Encounters:   11/19/18 97%   10/29/18 98%   10/29/18 99%      Temp Readings from Last 1 Encounters:   11/19/18 36.8  C (98.3  F) (Oral)    Ht Readings from Last 1 Encounters:   11/19/18 1.626 m (5' 4.02\")      Wt Readings from Last 1 Encounters:   11/19/18 50.3 kg (110 lb 12.8 oz)    Estimated body mass index is 19.01 kg/(m^2) as calculated from the following:    Height as of 11/19/18: 1.626 m (5' 4.02\").    Weight as of 11/19/18: 50.3 kg (110 lb 12.8 oz).     LDA:            Assessment:   ASA SCORE: 3       Documentation: H&P complete; Preop Testing complete; Consents complete   Proceeding: Proceed without further delay  Tobacco Use:  NO Active use of Tobacco/UNKNOWN Tobacco use status     Plan:   Anes. Type:  General   Pre-Induction: Midazolam IV; Acetaminophen PO   Induction:  IV (Standard)   Airway: Oral ETT; CMAC/VL   Access/Monitoring: PIV   Maintenance: Propofol   Emergence: Procedure Site   Logistics: Same Day Surgery     Postop Pain/Sedation Strategy:  Standard-Options: Opioids PRN     PONV Management:  Adult Risk Factors: Female, H/o PONV or Motion Sickness, Non-Smoker, Postop Opioids  Prevention: Ondansetron; Dexamethasone     CONSENT:   Plan and risks discussed with: Patient    "     Comments for Plan/Consent:  Native airway and jet ventilation for the rigid broncho copy part.                         No current facility-administered medications on file prior to encounter.   Current Outpatient Prescriptions on File Prior to Encounter:  calcium-vitamin D (CALTRATE) 600-400 MG-UNIT per tablet Take 1 tablet by mouth daily   magnesium oxide (MAG-OX) 400 MG tablet Take 1 tablet (400 mg) by mouth daily   metoprolol tartrate (LOPRESSOR) 25 MG tablet Take 1 tablet (25 mg) by mouth 2 times daily   multivitamin, therapeutic with minerals (THERA-VIT-M) TABS tablet Take 1 tablet by mouth daily   pantoprazole (PROTONIX) 40 MG EC tablet Take 1 tablet (40 mg) by mouth 2 times daily   pentoxifylline (TRENTAL) 400 MG CR tablet Take 1 tablet (400 mg) by mouth 2 times daily   predniSONE (DELTASONE) 2.5 MG tablet Take 1 tablet (2.5 mg) by mouth daily 6/22/18             7.5                    7.57/20/18             7.5                    58/24/18             5                       59/21/18             5                       2.5 (Patient taking differently: Take 2.5 mg by mouth At Bedtime 6/22/18             7.5                    7.5  7/20/18             7.5                    5  8/24/18             5                       5  9/21/18             5                       2.5)   predniSONE (DELTASONE) 5 MG tablet Follow taper card (Patient taking differently: Take 5 mg by mouth every morning Follow taper card)   sulfamethoxazole-trimethoprim (BACTRIM DS/SEPTRA DS) 800-160 MG per tablet Take 1 tablet by mouth daily X one month.   tacrolimus (GENERIC EQUIVALENT) 0.5 MG capsule Take 1 capsule (0.5 mg) by mouth 2 times daily Along with four 1mg capsules for total dose of 4.5mg am and 4.5mg pm.   tacrolimus (GENERIC EQUIVALENT) 1 MG capsule Take 4 capsules (4 mg) by mouth 2 times daily Take 4 mg in the AM and 4 mg in the PM along with one 0.5mg for total dose 4.5mg am and 4.5mg pm.   valGANciclovir (VALCYTE) 450 MG  tablet Take 450 mg by mouth daily   ACETAMINOPHEN PO Take 1,000 mg by mouth every 6 hours as needed for pain   albuterol (PROAIR HFA/PROVENTIL HFA/VENTOLIN HFA) 108 (90 Base) MCG/ACT Inhaler Inhale 2 puffs into the lungs every 6 hours as needed for shortness of breath / dyspnea or wheezing   guaiFENesin-codeine (ROBITUSSIN AC) 100-10 MG/5ML SOLN solution Take 5-10 mLs by mouth every 6 hours as needed for cough   ondansetron (ZOFRAN) 4 MG tablet Take 1 tablet (4 mg) by mouth every 12 hours as needed for nausea   order for DME Equipment being ordered: Nebulizer   order for DME Equipment being ordered: Nebulizer         Nakita Aguilar MD

## 2018-11-20 NOTE — TELEPHONE ENCOUNTER
----- Message from Evelyn Wright, RN sent at 11/20/2018 10:06 AM CST -----  See below. Thanks!  ----- Message -----     From: Dean Riley MD     Sent: 11/20/2018  10:03 AM       To: Wilber Lin MD, Evelyn Wright, RN    Yes, that should be fine.    Thanks!  Dean Riley      ----- Message -----     From: Wilber Lin MD     Sent: 11/20/2018   9:44 AM       To: Ame Chow PA-C, Dean Riley MD    Hi,  Will give her prednisone to decrease airway edema for 20 mg day for 5 days then she can go back to her baseline taper.  Is that OK?  Thanks,  bari      Called to review prednisone burst.  Told  medication was sent to [facility] in [place].  Reviewed tacrolimus dosing.

## 2018-11-20 NOTE — DISCHARGE INSTRUCTIONS
Post Bronchoscopy Patient Instructions:    November 20, 2018  Kecia Blue    Your procedure completed (bronchoscopy with dilation) without any immediate complications.  You may cough up scant amount of blood for the next 12 hours. If you have excessive cough with blood, chest pain, shortness of breath, please report to the closest emergency room.    You may experience low grade (less than 100.5 F) fever next 24 hours, if so you can take tylenol. If the fever persists more than 24 hours contact to our office or your primary care provider.    Please call our office (Thoracic/Pulmonary--[phone]) if have any questions.    Please take prednisone at 20 mg day for 5 days then go back to taper as outlined by transplant team. Please  your prescription from your pharmacy in [place].    Your next procedure is scheduled for Thursday 9 am on December 6th, 2018.    May resume your regular diet as it was prior to procedure.    Should you have any question, please do not hesitate to call our office.    DA Lin MD      [facility]  Same-Day Surgery   Adult Discharge Orders & Instructions     For 24 hours after surgery    1. Get plenty of rest.  A responsible adult must stay with you for at least 24 hours after you leave the hospital.   2. Do not drive or use heavy equipment.  If you have weakness or tingling, don't drive or use heavy equipment until this feeling goes away.  3. Do not drink alcohol.  4. Avoid strenuous or risky activities.  Ask for help when climbing stairs.   5. You may feel lightheaded.  IF so, sit for a few minutes before standing.  Have someone help you get up.   6. If you have nausea (feel sick to your stomach): Drink only clear liquids such as apple juice, ginger ale, broth or 7-Up.  Rest may also help.  Be sure to drink enough fluids.  Move to a regular diet as you feel able.  7. You may have a slight fever. Call the doctor if your fever is  over 100 F (37.7 C) (taken under the tongue) or lasts longer than 24 hours.  8. You may have a dry mouth, a sore throat, muscle aches or trouble sleeping.  These should go away after 24 hours.  9. Do not make important or legal decisions.   Call your doctor for any of the followin.  Signs of infection (fever, growing tenderness at the surgery site, a large amount of drainage or bleeding, severe pain, foul-smelling drainage, redness, swelling).    2. It has been over 8 to 10 hours since surgery and you are still not able to urinate (pass water).    3.  Headache for over 24 hours.    4.  Numbness, tingling or weakness the day after surgery (if you had spinal anesthesia).  Use this number to contact the clinic during regular business hours only please.  To contact a doctor, call __Dr. Lin's office at [phone]__ or:        Use this number if the clinic is closed; this is a hospital answering service [phone] and ask for the resident on call for   __Dr. Lin / Pulmonary__ (answered 24 hours a day)      Emergency Department:    [facility]: [phone]       (TTY for hearing impaired: [phone])

## 2018-11-20 NOTE — IP AVS SNAPSHOT
MRN:[id number]                      After Visit Summary   11/20/2018    Kecia Blue    MRN: [id number]           Thank you!     Thank you for choosing [facility] for your care. Our goal is always to provide you with excellent care. Hearing back from our patients is one way we can continue to improve our services. Please take a few minutes to complete the written survey that you may receive in the mail after you visit with us. Thank you!        Patient Information     Date Of Birth          1960        About your hospital stay     You were admitted on:  November 20, 2018 You last received care in the:  Post Anesthesia Care Unit [facility]    You were discharged on:  November 20, 2018       Who to Call     For medical emergencies, please call 911.  For non-urgent questions about your medical care, please call your primary care provider or clinic, [phone]  For questions related to your surgery, please call your surgery clinic        Attending Provider     Provider Specialty    Dinchela, Wilber Warner MD Pulmonary       Primary Care Provider Office Phone # Fax #    Rosy Vu [phone]      Your next 10 appointments already scheduled     Dec 05, 2018  9:00 AM CST   Lab with  LAB    Health Lab ([facility])    [address] [place] [address] [phone]            Dec 05, 2018  9:15 AM CST   XR CHEST 2 VIEWS with XR[address] [place] [address] Imaging Center Xray ([facility])    [address] [place] [address] [phone]           How do I prepare for my exam? (Food and drink instructions) No Food and Drink Restrictions.  How do I prepare for my exam? (Other instructions) You do not need to do anything special for this exam.  What should I wear: Wear comfortable clothes.  How long does the exam take: Most scans take less than 5 minutes.  What should I bring:  Bring a list of your medicines, including vitamins, minerals and over-the-counter drugs. It is safest to leave personal items at home.  Do I need a :  No  is needed.  What do I need to tell my doctor: Tell your doctor if there s any chance you are pregnant.  What should I do after the exam: No restrictions, You may resume normal activities.  What is this test: An image of a specific body part shown in shades of black and white.  Who should I call with questions: If you have any questions, please call the Imaging Department where you will have your exam. Directions, parking instructions, and other information is available on our website, [email or web address].            Dec 05, 2018  9:30 AM CST   PFT VISIT with SUKI STERN [facility] Pulmonary Function Testing ([facility])    [address]  3rd Floor  [facility] [address]   [phone]            Dec 05, 2018 10:20 AM CST   (Arrive by 10:05 AM)   Return Lung Transplant with JONI Up [facility] for [facility] Science and [facility] ([facility])    [address] [place] [address]  [address]  [facility] [address]   [phone]            Dec 06, 2018   Procedure with Wilber Lin MD   [facility] [place], Same Day Surgery (--)    [address] [address]   [phone]            Dec 13, 2018  8:40 AM CST   (Arrive by 8:25 AM)   New Patient Visit with JONI Wray [facility] Gastroenterology and IBD Clinic ([facility])    [address]  4th Floor  [facility] [address]   [phone]              Further instructions from your care team       Post Bronchoscopy Patient Instructions:    November 20, 2018  Kecia Blue    Your procedure completed (bronchoscopy with dilation) without any immediate complications.  You may cough up scant amount of blood for the next 12 hours. If you have excessive cough with blood, chest pain,  shortness of breath, please report to the closest emergency room.    You may experience low grade (less than 100.5 F) fever next 24 hours, if so you can take tylenol. If the fever persists more than 24 hours contact to our office or your primary care provider.    Please call our office (Thoracic/Pulmonary--[phone]) if have any questions.    Please take prednisone at 20 mg day for 5 days then go back to taper as outlined by transplant team. Please  your prescription from your pharmacy in [place].    Your next procedure is scheduled for Thursday 9 am on 2018.    May resume your regular diet as it was prior to procedure.    Should you have any question, please do not hesitate to call our office.    DA Lin MD      [facility] [place]  Same-Day Surgery   Adult Discharge Orders & Instructions     For 24 hours after surgery    1. Get plenty of rest.  A responsible adult must stay with you for at least 24 hours after you leave the hospital.   2. Do not drive or use heavy equipment.  If you have weakness or tingling, don't drive or use heavy equipment until this feeling goes away.  3. Do not drink alcohol.  4. Avoid strenuous or risky activities.  Ask for help when climbing stairs.   5. You may feel lightheaded.  IF so, sit for a few minutes before standing.  Have someone help you get up.   6. If you have nausea (feel sick to your stomach): Drink only clear liquids such as apple juice, ginger ale, broth or 7-Up.  Rest may also help.  Be sure to drink enough fluids.  Move to a regular diet as you feel able.  7. You may have a slight fever. Call the doctor if your fever is over 100 F (37.7 C) (taken under the tongue) or lasts longer than 24 hours.  8. You may have a dry mouth, a sore throat, muscle aches or trouble sleeping.  These should go away after 24 hours.  9. Do not make important or legal decisions.   Call your doctor for any of the followin.   Signs of infection (fever, growing tenderness at the surgery site, a large amount of drainage or bleeding, severe pain, foul-smelling drainage, redness, swelling).    2. It has been over 8 to 10 hours since surgery and you are still not able to urinate (pass water).    3.  Headache for over 24 hours.    4.  Numbness, tingling or weakness the day after surgery (if you had spinal anesthesia).  Use this number to contact the clinic during regular business hours only please.  To contact a doctor, call __Dr. Lin's office at [phone]__ or:        Use this number if the clinic is closed; this is a hospital answering service [phone] and ask for the resident on call for   __Dr. Lin / Pulmonary__ (answered 24 hours a day)      Emergency Department:    [facility]: [phone]       (TTY for hearing impaired: [phone])    Additional Information     If you use hormonal birth control (such as the pill, patch, ring or implants): You'll need a second form of birth control for 7 days (condoms, a diaphragm or contraceptive foam). While in the hospital, you received a medicine called Bridion. Your normal birth control will not work as well for a week after taking this medicine.          Pending Results     Date and Time Order Name Status Description    11/20/2018 0907 Cytology non gyn In process     11/20/2018 0907 Nocardia culture In process     11/20/2018 0907 Miscellaneous Culture Aerobic Bacterial In process     11/20/2018 0907 Koh prep In process     11/20/2018 0907 Gram stain In process     11/20/2018 0907 Fungus Culture, non-blood In process     11/20/2018 0907 CMV DNA quantification In process     11/20/2018 0907 Cell count with differential fluid In process     11/20/2018 0907 AFB Stain Non Blood In process     11/20/2018 0907 AFB Culture Non Blood In process     11/20/2018 0907 Actinomyces rule out In process     11/20/2018 0855 Pneumocystis jirovecil by PCR In process     11/19/2018 0627 PRA DONOR  "SPECIFIC ANTIBODY In process             Admission Information     Date & Time Provider Department Dept. Phone    11/20/2018 Wilber Lin MD Post Anesthesia Care Unit [facility] [phone]      Your Vitals Were     Blood Pressure Temperature Respirations Height Weight Last Period    126/79 98.1  F (36.7  C) (Oral) 14 1.626 m (5' 4\") 50 kg (110 lb 3.7 oz) 06/07/2014 (Exact Date)    Pulse Oximetry BMI (Body Mass Index)                96% 18.92 kg/m2          MyChart Information     [facility] gives you secure access to your electronic health record. If you see a primary care provider, you can also send messages to your care team and make appointments. If you have questions, please call your primary care clinic.  If you do not have a primary care provider, please call [phone] and they will assist you.        Care EveryWhere ID     This is your Care EveryWhere ID. This could be used by other organizations to access your [facility] medical records  [id number]        Equal Access to Services     ALBNA VALENCIA AH: Hadii patricia fofanao Soto, waaxda luqadaha, qaybta kaalmada adedaniella, morro kelley. So [facility] [phone].    ATENCIÓN: Si habla español, tiene a taylor disposición servicios gratuitos de asistencia lingüística. Llame al [phone].    We comply with applicable federal civil rights laws and [place] laws. We do not discriminate on the basis of race, color, national origin, age, disability, sex, sexual orientation, or gender identity.               Review of your medicines      UNREVIEWED medicines. Ask your doctor about these medicines        Dose / Directions    * predniSONE 2.5 MG tablet   Commonly known as:  DELTASONE   This may have changed:    - when to take this  - additional instructions   Used for:  Lung replaced by transplant (H)   Ask about: Which instructions should I use?        Dose:  2.5 mg   Take 1 tablet (2.5 mg) by mouth daily 6/22/18             7.5 "                    7.5 7/20/18             7.5                    5 8/24/18             5                       5 9/21/18             5                       2.5   Quantity:  90 tablet   Refills:  1       * predniSONE 5 MG tablet   Commonly known as:  DELTASONE   This may have changed:    - how much to take  - how to take this  - when to take this  - additional instructions   Used for:  S/P lung transplant (H)   Ask about: Which instructions should I use?        Follow taper card   Quantity:  180 tablet   Refills:  3       * predniSONE 20 MG tablet   Commonly known as:  DELTASONE   This may have changed:  You were already taking a medication with the same name, and this prescription was added. Make sure you understand how and when to take each.   Used for:  Lung replaced by transplant (H)   Ask about: Which instructions should I use?        Dose:  20 mg   Take 1 tablet (20 mg) by mouth daily   Quantity:  5 tablet   Refills:  0       * Notice:  This list has 3 medication(s) that are the same as other medications prescribed for you. Read the directions carefully, and ask your doctor or other care provider to review them with you.      CONTINUE these medicines which have NOT CHANGED        Dose / Directions    ACETAMINOPHEN PO        Dose:  1000 mg   Take 1,000 mg by mouth every 6 hours as needed for pain   Refills:  0       albuterol 108 (90 Base) MCG/ACT inhaler   Commonly known as:  PROAIR HFA/PROVENTIL HFA/VENTOLIN HFA   Used for:  SOB (shortness of breath), Wheezing        Dose:  2 puff   Inhale 2 puffs into the lungs every 6 hours as needed for shortness of breath / dyspnea or wheezing   Quantity:  1 Inhaler   Refills:  1       azaTHIOprine 50 MG tablet   Commonly known as:  IMURAN   Used for:  Lung replaced by transplant (H)        Dose:  150 mg   Take 3 tablets (150 mg) by mouth every evening   Quantity:  90 tablet   Refills:  11       calcium carbonate 600 mg-vitamin D 400 units 600-400 MG-UNIT per tablet    Commonly known as:  CALTRATE   Used for:  S/P lung transplant (H), ILD (interstitial lung disease) (H), Lung transplant recipient (H), Encounter for long-term (current) use of high-risk medication, Anemia, unspecified type, Cytomegalovirus (CMV) viremia (H)        Dose:  1 tablet   Take 1 tablet by mouth daily   Refills:  0       guaiFENesin-codeine 100-10 MG/5ML Soln solution   Commonly known as:  ROBITUSSIN AC   Used for:  Cough        Dose:  1-2 tsp.   Take 5-10 mLs by mouth every 6 hours as needed for cough   Quantity:  120 mL   Refills:  0       magnesium oxide 400 MG tablet   Commonly known as:  MAG-OX   Used for:  Hypomagnesemia        Dose:  400 mg   Take 1 tablet (400 mg) by mouth daily   Quantity:  90 tablet   Refills:  3       metoprolol tartrate 25 MG tablet   Commonly known as:  LOPRESSOR   Used for:  Paroxysmal atrial fibrillation (H)        Dose:  25 mg   Take 1 tablet (25 mg) by mouth 2 times daily   Quantity:  60 tablet   Refills:  11       multivitamin, therapeutic with minerals Tabs tablet   Used for:  Lung transplant recipient (H)        Dose:  1 tablet   Take 1 tablet by mouth daily   Quantity:  30 each   Refills:  11       ondansetron 4 MG tablet   Commonly known as:  ZOFRAN   Used for:  Intractable vomiting with nausea, unspecified vomiting type        Dose:  4 mg   Take 1 tablet (4 mg) by mouth every 12 hours as needed for nausea   Quantity:  60 tablet   Refills:  1       order for DME   Used for:  Status post lung transplantation (H)        Equipment being ordered: Nebulizer   Quantity:  1 Device   Refills:  1       order for DME   Used for:  Lung transplant recipient (H)        Equipment being ordered: Nebulizer   Quantity:  1 Device   Refills:  1       pantoprazole 40 MG EC tablet   Commonly known as:  PROTONIX   Used for:  Gastroesophageal reflux disease without esophagitis, ILD (interstitial lung disease) (H), Lung transplant recipient (H)        Dose:  40 mg   Take 1 tablet (40 mg) by  mouth 2 times daily   Quantity:  60 tablet   Refills:  3       pentoxifylline 400 MG CR tablet   Commonly known as:  TRENtal   Used for:  Lung transplant recipient (H)        Dose:  400 mg   Take 1 tablet (400 mg) by mouth 2 times daily   Quantity:  60 tablet   Refills:  11       sulfamethoxazole-trimethoprim 800-160 MG per tablet   Commonly known as:  BACTRIM DS/SEPTRA DS   Used for:  Actinomyces infection, Lung replaced by transplant (H)        Dose:  1 tablet   Take 1 tablet by mouth daily X one month.   Quantity:  30 tablet   Refills:  1       * tacrolimus 0.5 MG capsule   Commonly known as:  GENERIC EQUIVALENT   Used for:  S/P lung transplant (H)        Dose:  0.5 mg   Take 1 capsule (0.5 mg) by mouth 2 times daily Along with four 1mg capsules for total dose of 4.5mg am and 4.5mg pm.   Quantity:  60 capsule   Refills:  11       * tacrolimus 1 MG capsule   Commonly known as:  GENERIC EQUIVALENT   Used for:  S/P lung transplant (H), Other constipation        Dose:  4 mg   Take 4 capsules (4 mg) by mouth 2 times daily Take 4 mg in the AM and 4 mg in the PM along with one 0.5mg for total dose 4.5mg am and 4.5mg pm.   Quantity:  240 capsule   Refills:  11       VALCYTE 450 MG tablet   Used for:  Cytomegalovirus (CMV) viremia (H), ILD (interstitial lung disease) (H), Lung transplant recipient (H), Encounter for long-term (current) use of high-risk medication, Anemia, unspecified type, S/P lung transplant (H)   Generic drug:  valGANciclovir        Dose:  450 mg   Take 450 mg by mouth daily   Quantity:  30 tablet   Refills:  6       * Notice:  This list has 2 medication(s) that are the same as other medications prescribed for you. Read the directions carefully, and ask your doctor or other care provider to review them with you.         Where to get your medicines      These medications were sent to [place] [address] [place] [facility] [address] [facility] [address] [facility] [address]  [address] [facility] [address] [address]     Phone:   [phone]     predniSONE 20 MG tablet                Protect others around you: Learn how to safely use, store and throw away your medicines at www.disposemymeds.org.             Medication List: This is a list of all your medications and when to take them. Check marks below indicate your daily home schedule. Keep this list as a reference.      Medications           Morning Afternoon Evening Bedtime As Needed    ACETAMINOPHEN PO   Take 1,000 mg by mouth every 6 hours as needed for pain                                albuterol 108 (90 Base) MCG/ACT inhaler   Commonly known as:  PROAIR HFA/PROVENTIL HFA/VENTOLIN HFA   Inhale 2 puffs into the lungs every 6 hours as needed for shortness of breath / dyspnea or wheezing                                azaTHIOprine 50 MG tablet   Commonly known as:  IMURAN   Take 3 tablets (150 mg) by mouth every evening                                calcium carbonate 600 mg-vitamin D 400 units 600-400 MG-UNIT per tablet   Commonly known as:  CALTRATE   Take 1 tablet by mouth daily                                guaiFENesin-codeine 100-10 MG/5ML Soln solution   Commonly known as:  ROBITUSSIN AC   Take 5-10 mLs by mouth every 6 hours as needed for cough                                magnesium oxide 400 MG tablet   Commonly known as:  MAG-OX   Take 1 tablet (400 mg) by mouth daily                                metoprolol tartrate 25 MG tablet   Commonly known as:  LOPRESSOR   Take 1 tablet (25 mg) by mouth 2 times daily                                multivitamin, therapeutic with minerals Tabs tablet   Take 1 tablet by mouth daily                                ondansetron 4 MG tablet   Commonly known as:  ZOFRAN   Take 1 tablet (4 mg) by mouth every 12 hours as needed for nausea                                order for DME   Equipment being ordered: Nebulizer                                order for DME   Equipment being ordered: Nebulizer                                pantoprazole  40 MG EC tablet   Commonly known as:  PROTONIX   Take 1 tablet (40 mg) by mouth 2 times daily                                pentoxifylline 400 MG CR tablet   Commonly known as:  TRENtal   Take 1 tablet (400 mg) by mouth 2 times daily                                sulfamethoxazole-trimethoprim 800-160 MG per tablet   Commonly known as:  BACTRIM DS/SEPTRA DS   Take 1 tablet by mouth daily X one month.                                * tacrolimus 0.5 MG capsule   Commonly known as:  GENERIC EQUIVALENT   Take 1 capsule (0.5 mg) by mouth 2 times daily Along with four 1mg capsules for total dose of 4.5mg am and 4.5mg pm.                                * tacrolimus 1 MG capsule   Commonly known as:  GENERIC EQUIVALENT   Take 4 capsules (4 mg) by mouth 2 times daily Take 4 mg in the AM and 4 mg in the PM along with one 0.5mg for total dose 4.5mg am and 4.5mg pm.                                VALCYTE 450 MG tablet   Take 450 mg by mouth daily   Generic drug:  valGANciclovir                                * Notice:  This list has 2 medication(s) that are the same as other medications prescribed for you. Read the directions carefully, and ask your doctor or other care provider to review them with you.      ASK your doctor about these medications           Morning Afternoon Evening Bedtime As Needed    * predniSONE 2.5 MG tablet   Commonly known as:  DELTASONE   Take 1 tablet (2.5 mg) by mouth daily 6/22/18             7.5                    7.5 7/20/18             7.5                    5 8/24/18             5                       5 9/21/18             5                       2.5   Ask about: Which instructions should I use?             See new instructions on page __ .  This medication has changed.                    * predniSONE 5 MG tablet   Commonly known as:  DELTASONE   Follow taper card   Ask about: Which instructions should I use?             See new instructions on page __ .  This medication has changed.                     * predniSONE 20 MG tablet   Commonly known as:  DELTASONE   Take 1 tablet (20 mg) by mouth daily   Ask about: Which instructions should I use?             See new instructions on page __ .  This medication has changed.                    * Notice:  This list has 3 medication(s) that are the same as other medications prescribed for you. Read the directions carefully, and ask your doctor or other care provider to review them with you.

## 2018-11-20 NOTE — IP AVS SNAPSHOT
Post Anesthesia Care Unit [address] [address]    Phone:  [phone]                                       After Visit Summary   11/20/2018    Kecia Blue    MRN: [id number]           After Visit Summary Signature Page     I have received my discharge instructions, and my questions have been answered. I have discussed any challenges I see with this plan with the nurse or doctor.    ..........................................................................................................................................  Patient/Patient Representative Signature      ..........................................................................................................................................  Patient Representative Print Name and Relationship to Patient    ..................................................               ................................................  Date                                   Time    ..........................................................................................................................................  Reviewed by Signature/Title    ...................................................              ..............................................  Date                                               Time          22EPIC Rev 08/18

## 2018-11-20 NOTE — PROCEDURES
INTERVENTIONAL PULMONOLOGY       Procedure(s):    A flexible and rigid bronchoscopy   Airway exam  Airway stent removal (1 stents)  Balloon bronchoplasty without stent placement (2 sites)   Therapeutic suctioning  Bronchial wash  Mechanical debulking (1 site)    Indication:  BSLT with R anastomotic stenosis s/p stent with migration, cough, dyspnea    Attending of Record:  Alana Lin MD    Interventional Pulmonary Fellow   Preet Patel MD     Trainees Present:   None    Medications:    General Anesthesia - See anesthesia flowsheet for details    Sedation Time:   Per Anesthesia Care Provider    Time Out:  Performed    The patient's medical record has been reviewed.  The indication for the procedure was reviewed.  The necessary history and physical examination was performed and reviewed.  The risks, benefits and alternatives of the procedure were discussed with the the patient in detail and she had the opportunity to ask questions.  I discussed in particular the potential complications including risks of minor or life-threatening bleeding and/or infection, respiratory failure, vocal cord trauma / paralysis, pneumothorax, and discomfort. Sedation risks were also discussed including abnormal heart rhythms, low blood pressure, and respiratory failure. All questions were answered to the best of my ability.  Verbal and written informed consent was obtained.  The proposed procedure and the patient's identification were verified prior to the procedure by the physician and the surgical team.    The patient was assessed for the adequacy for the procedure and to receive medications.   Mental Status:  Alert and oriented x 3  Airway examination:  Class II (Complete visualization of uvula)  Pulmonary:  Upper airway wheeze  CV:  RRR, no murmurs or gallops  ASA Grade:  (III)  Severe systemic disease    After clinical evaluation and reviewing the indication, risks, alternatives and benefits of the procedure the patient  was deemed to be in satisfactory condition to undergo the procedure.           A Tuberculosis risk assessment was performed:  The patient has no known RISK of Tuberculosis    The procedure was performed in a negative airflow room: The patient could not be moved to a negative airflow room because of needed OR for the procedure    Maneuvers / Procedure:      A Flexible and 8.5mm Rigid bronchoscope bronchoscope was used for the procedure. The rigid bronchoscope was inserted into the mouth. Uvula, epiglottis and vocal cords were seen. The scope was advanced turning the bevel to 90degress while passing through the cords and into the trachea.   Airway dilation: Airway dilation#1: The 8-9-10mm Elation Ballooon was used to dilate the Right mainstem  airway. Each dilation was held for 60sec and repeated once and Airway dilation#2: The 8-9-10mm Elation Ballooon was used to dilate the Bronchus intermedius  airway. Each dilation was held for 60sec and repeated once.  There was a small inferior tear in the R anastomosis after 8 mm dilation    Airway Examination: A complete airway examination was performed from the distal trachea to the subsegmental level in each lobe of both lungs.  Pertinent findings include proximally migrated aero stent. Widely patent and healthy appearing L anastomosis.  R anastomosis with 70-80% stenosis with near occlusion with friable/fragile granulation tissue.         Stent removal: A total of 1 stent(s) were removed (12x30) using the optical forceps.    Mechanical debulking using biopsy forceps was used for granulation tissue removal at anastomosis.    Any disposable equipment was visually inspected and deemed to be intact immediately post procedure.      Relevant Pictures            L anastomosis      R anastomosis post stent removal and dilation        Recommendations:   Tight anastomosis with large R main airway and very short R main distal to anastomosis leads to challenging stent placement.  Will  "let her \"cool off\" due to significant irritation from migrated stent.  May need stent from proximal to anastomosis through BI with piercing RUL stent.    -->  Continue nebs  -->  Repeat rigid bronch with possible stent placement 6 December.      Preet Patel MD  Interventional Pulmonary  Department of Pulmonary, Allergy, Critical Care and Sleep Medicine   [facility]    Krystle Salgado CNS, OCN  Clinical Nurse Specialist  Department of Thoracic Surgery  Office: [phone]  Email: [email or web address]    Chloe Avila  Interventional Pulmonology Surgery Scheduler  Office: [phone]  Email: [email or web address]      I was present with the patient, Kecia Blue, for the entire viewing portion of the bronchscopy procedure (including scope insertion and withdrawal) and agree with the interpretation and report as documented by Dr. Preet Patel.   Alana Lin MD  "

## 2018-11-20 NOTE — ANESTHESIA CARE TRANSFER NOTE
Patient: Kecia Blue    Procedure(s):  Rigid Bronchoscopy, Stent Removal and dilitation    Diagnosis: Bronchial Stenosis   Diagnosis Additional Information: No value filed.    Anesthesia Type:   General     Note:  Airway :Face Mask  Patient transferred to:PACU  Comments: Pt alert, breathing spontaneously on 8L O2 via FM. VSS. Report shared with RN.Handoff Report: Identifed the Patient, Identified the Reponsible Provider, Reviewed the pertinent medical history, Discussed the surgical course, Reviewed Intra-OP anesthesia mangement and issues during anesthesia, Set expectations for post-procedure period and Allowed opportunity for questions and acknowledgement of understanding      Vitals: (Last set prior to Anesthesia Care Transfer)    CRNA VITALS  11/20/2018 0902 - 11/20/2018 0939      11/20/2018             Pulse: 105    SpO2: 98 %                Electronically Signed By: ADRIÁN Falk CRNA  November 20, 2018  9:39 AM

## 2018-11-20 NOTE — ANESTHESIA POSTPROCEDURE EVALUATION
Anesthesia POST Procedure Evaluation    Patient: Kecia Blue   MRN:     [id number] Gender:   female   Age:    57 year old :      1960        Preoperative Diagnosis: Bronchial Stenosis    Procedure(s):  Rigid Bronchoscopy, Stent Removal and dilitation   Postop Comments: No value filed.       Anesthesia Type:  General    Reportable Event: NO     PAIN: Uncomplicated   Sign Out status: Comfortable, Well controlled pain     PONV: No PONV   Sign Out status:  No Nausea or Vomiting     Neuro/Psych: Uneventful perioperative course   Sign Out Status: Preoperative baseline; Age appropriate mentation     Airway/Resp.: Uneventful perioperative course   Sign Out Status: Non labored breathing, age appropriate RR; Resp. Status within EXPECTED Parameters     CV: Uneventful perioperative course   Sign Out status: Appropriate BP and perfusion indices; Appropriate HR/Rhythm     Disposition:   Sign Out in:  PACU  Disposition:  Phase II; Home  Recovery Course: Uneventful  Follow-Up: Not required           Last Anesthesia Record Vitals:  CRNA VITALS  2018 0902 - 2018 1002      2018             Pulse: 105    SpO2: 98 %          Last PACU/Preop Vitals:  Vitals:    18 1030 18 1040 18 1045   BP: 131/82 119/81 119/81   Resp: 14 16 14   Temp: 36.7  C (98.1  F)     SpO2: 99% 94% 96%         Electronically Signed By: Nakita Aguilar MD, 2018, 10:51 AM  
no concerns

## 2018-11-20 NOTE — PROGRESS NOTES
Per Dr Lin and pulmonary transplant team, give prednisone burst of 20mg daily x 5 days for airway inflammation.

## 2018-11-21 LAB
FLUAV H1 2009 PAND RNA SPEC QL NAA+PROBE: NEGATIVE
FLUAV H1 RNA SPEC QL NAA+PROBE: NEGATIVE
FLUAV H3 RNA SPEC QL NAA+PROBE: NEGATIVE
FLUAV RNA SPEC QL NAA+PROBE: NEGATIVE
FLUBV RNA SPEC QL NAA+PROBE: NEGATIVE
HADV DNA SPEC QL NAA+PROBE: NEGATIVE
HADV DNA SPEC QL NAA+PROBE: NEGATIVE
HMPV RNA SPEC QL NAA+PROBE: NEGATIVE
HPIV1 RNA SPEC QL NAA+PROBE: NEGATIVE
HPIV2 RNA SPEC QL NAA+PROBE: NEGATIVE
HPIV3 RNA SPEC QL NAA+PROBE: NEGATIVE
MICROBIOLOGIST REVIEW: NORMAL
RHINOVIRUS RNA SPEC QL NAA+PROBE: NEGATIVE
RSV RNA SPEC QL NAA+PROBE: NEGATIVE
RSV RNA SPEC QL NAA+PROBE: NEGATIVE
SPECIMEN SOURCE: NORMAL

## 2018-11-21 NOTE — PROGRESS NOTES
New finding from bronch 11/20/18.  Patient started on prednisone yesterday for airway inflammation.  Let me know if any changes.  I am here Friday this week. Magaly

## 2018-11-22 LAB
ACID FAST STN SPEC QL: NORMAL
CMV DNA SPEC NAA+PROBE-ACNC: 1425 [IU]/ML
CMV DNA SPEC NAA+PROBE-LOG#: 3.2 {LOG_IU}/ML
SPECIMEN SOURCE: ABNORMAL
SPECIMEN SOURCE: NORMAL

## 2018-11-26 ENCOUNTER — TELEPHONE (OUTPATIENT)
Dept: TRANSPLANT | Facility: CLINIC | Age: 58
End: 2018-11-26

## 2018-11-26 DIAGNOSIS — R05.9 COUGH: ICD-10-CM

## 2018-11-26 DIAGNOSIS — J40 MORAXELLA CATARRHALIS BRONCHITIS: Primary | ICD-10-CM

## 2018-11-26 DIAGNOSIS — B96.89 MORAXELLA CATARRHALIS BRONCHITIS: Primary | ICD-10-CM

## 2018-11-26 LAB
BACTERIA SPEC CULT: ABNORMAL
BACTERIA SPEC CULT: NORMAL
DONOR IDENTIFICATION: NORMAL
DSA COMMENTS: NORMAL
DSA PRESENT: NO
DSA TEST METHOD: NORMAL
FUNGUS SPEC CULT: NORMAL
ORGAN: NORMAL
PROTOCOL CUTOFF: NORMAL
SA1 CELL: NORMAL
SA1 COMMENTS: NORMAL
SA1 HI RISK ABY: NORMAL
SA1 MOD RISK ABY: NORMAL
SA1 TEST METHOD: NORMAL
SA2 CELL: NORMAL
SA2 COMMENTS: NORMAL
SA2 HI RISK ABY UA: NORMAL
SA2 MOD RISK ABY: NORMAL
SA2 TEST METHOD: NORMAL
SPECIMEN SOURCE: ABNORMAL
SPECIMEN SOURCE: NORMAL
SPECIMEN SOURCE: NORMAL
UNACCEPTABLE ANTIGEN: NORMAL
UNOS CPRA: 0

## 2018-11-26 RX ORDER — CODEINE PHOSPHATE AND GUAIFENESIN 10; 100 MG/5ML; MG/5ML
1-2 SOLUTION ORAL EVERY 6 HOURS PRN
Qty: 120 ML | Refills: 0
Start: 2018-11-26 | End: 2019-01-16

## 2018-11-26 NOTE — TELEPHONE ENCOUNTER
Bronch washing growing moraxella branhamella catarrhalis.  Sensitive to Augmentin.  875mg BID x 7 days ordered per Dr Lin.  Order sent to local pharmacy.  Patient notified and agreed to antibiotic therapy.

## 2018-11-27 DIAGNOSIS — Z94.2 LUNG REPLACED BY TRANSPLANT (H): ICD-10-CM

## 2018-11-27 PROCEDURE — 80197 ASSAY OF TACROLIMUS: CPT | Performed by: INTERNAL MEDICINE

## 2018-11-28 ENCOUNTER — TELEPHONE (OUTPATIENT)
Dept: PULMONOLOGY | Facility: CLINIC | Age: 58
End: 2018-11-28

## 2018-11-28 DIAGNOSIS — K59.09 OTHER CONSTIPATION: ICD-10-CM

## 2018-11-28 DIAGNOSIS — Z94.2 S/P LUNG TRANSPLANT (H): ICD-10-CM

## 2018-11-28 LAB
TACROLIMUS BLD-MCNC: 6.4 UG/L (ref 5–15)
TME LAST DOSE: NORMAL H

## 2018-11-28 RX ORDER — TACROLIMUS 1 MG/1
4 CAPSULE ORAL 2 TIMES DAILY
Qty: 240 CAPSULE | Refills: 11 | Status: SHIPPED | OUTPATIENT
Start: 2018-11-28 | End: 2018-12-05

## 2018-11-28 NOTE — TELEPHONE ENCOUNTER
Tacrolimus level 6.4 at 12 hours, on 11/26  Goal 9-11.   Current dose 3 mg in AM, 4 mg in PM    Dose changed to  4 mg in AM, 4 mg in PM   Recheck level in 7 days    Discussed with patient

## 2018-12-03 ENCOUNTER — TELEPHONE (OUTPATIENT)
Dept: TRANSPLANT | Facility: CLINIC | Age: 58
End: 2018-12-03

## 2018-12-03 NOTE — TELEPHONE ENCOUNTER
Pt calls to report that she has had some emesis and diarrhea since Sunday. She report 5 loose stools throughout the night into this AM. She reports that she has been able to take her pills this AM, but is concerned that she will become dehydrated as she is not able to eat or drink much. Pt also reports increased coughing. Pt moved to a clinic appointment tomorrow to be assessed. Pt instructed to have someone take her into the ER if the emesis and diarrhea continue throughout the day. Pt is agreeable to the plan. Pt has an OR bronch scheduled for Thurs 12/6/18.

## 2018-12-04 ENCOUNTER — TELEPHONE (OUTPATIENT)
Dept: TRANSPLANT | Facility: CLINIC | Age: 58
End: 2018-12-04

## 2018-12-04 ENCOUNTER — OFFICE VISIT (OUTPATIENT)
Dept: TRANSPLANT | Facility: CLINIC | Age: 58
End: 2018-12-04
Attending: INTERNAL MEDICINE
Payer: COMMERCIAL

## 2018-12-04 ENCOUNTER — INFUSION THERAPY VISIT (OUTPATIENT)
Dept: INFUSION THERAPY | Facility: CLINIC | Age: 58
End: 2018-12-04
Attending: INTERNAL MEDICINE
Payer: COMMERCIAL

## 2018-12-04 ENCOUNTER — RADIANT APPOINTMENT (OUTPATIENT)
Dept: GENERAL RADIOLOGY | Facility: CLINIC | Age: 58
End: 2018-12-04
Attending: PHYSICIAN ASSISTANT
Payer: COMMERCIAL

## 2018-12-04 VITALS — DIASTOLIC BLOOD PRESSURE: 84 MMHG | TEMPERATURE: 98.4 F | SYSTOLIC BLOOD PRESSURE: 152 MMHG | HEART RATE: 79 BPM

## 2018-12-04 VITALS
RESPIRATION RATE: 20 BRPM | OXYGEN SATURATION: 100 % | WEIGHT: 100.7 LBS | SYSTOLIC BLOOD PRESSURE: 124 MMHG | HEART RATE: 87 BPM | BODY MASS INDEX: 17.19 KG/M2 | TEMPERATURE: 98.1 F | DIASTOLIC BLOOD PRESSURE: 80 MMHG | HEIGHT: 64 IN

## 2018-12-04 DIAGNOSIS — N17.9 ACUTE KIDNEY INJURY (H): ICD-10-CM

## 2018-12-04 DIAGNOSIS — J84.9 ILD (INTERSTITIAL LUNG DISEASE) (H): Primary | ICD-10-CM

## 2018-12-04 DIAGNOSIS — E86.0 DEHYDRATION: ICD-10-CM

## 2018-12-04 DIAGNOSIS — Z94.2 LUNG TRANSPLANT RECIPIENT (H): ICD-10-CM

## 2018-12-04 DIAGNOSIS — Z79.899 ENCOUNTER FOR LONG-TERM (CURRENT) USE OF HIGH-RISK MEDICATION: ICD-10-CM

## 2018-12-04 DIAGNOSIS — R19.7 DIARRHEA OF PRESUMED INFECTIOUS ORIGIN: ICD-10-CM

## 2018-12-04 DIAGNOSIS — R11.2 INTRACTABLE VOMITING WITH NAUSEA, UNSPECIFIED VOMITING TYPE: ICD-10-CM

## 2018-12-04 DIAGNOSIS — R11.2 INTRACTABLE VOMITING WITH NAUSEA, UNSPECIFIED VOMITING TYPE: Primary | ICD-10-CM

## 2018-12-04 DIAGNOSIS — R11.2 NAUSEA AND VOMITING: Primary | ICD-10-CM

## 2018-12-04 DIAGNOSIS — Z94.2 LUNG REPLACED BY TRANSPLANT (H): ICD-10-CM

## 2018-12-04 LAB
ANION GAP SERPL CALCULATED.3IONS-SCNC: 14 MMOL/L (ref 3–14)
BACTERIA SPEC CULT: NORMAL
BUN SERPL-MCNC: 30 MG/DL (ref 7–30)
C DIFF TOX B STL QL: NEGATIVE
CALCIUM SERPL-MCNC: 9.4 MG/DL (ref 8.5–10.1)
CHLORIDE SERPL-SCNC: 100 MMOL/L (ref 94–109)
CO2 SERPL-SCNC: 18 MMOL/L (ref 20–32)
CREAT SERPL-MCNC: 1.13 MG/DL (ref 0.52–1.04)
ERYTHROCYTE [DISTWIDTH] IN BLOOD BY AUTOMATED COUNT: 16.4 % (ref 10–15)
EXPTIME-PRE: 7.49 SEC
FEF2575-%PRED-PRE: 23 %
FEF2575-PRE: 0.58 L/SEC
FEF2575-PRED: 2.43 L/SEC
FEFMAX-%PRED-PRE: 30 %
FEFMAX-PRE: 1.94 L/SEC
FEFMAX-PRED: 6.42 L/SEC
FEV1-%PRED-PRE: 37 %
FEV1-PRE: 0.98 L
FEV1FEV6-PRE: 53 %
FEV1FEV6-PRED: 81 %
FEV1FVC-PRE: 53 %
FEV1FVC-PRED: 79 %
FIFMAX-PRE: 2.33 L/SEC
FVC-%PRED-PRE: 56 %
FVC-PRE: 1.84 L
FVC-PRED: 3.23 L
GFR SERPL CREATININE-BSD FRML MDRD: 50 ML/MIN/1.7M2
GLUCOSE SERPL-MCNC: 86 MG/DL (ref 70–99)
HCT VFR BLD AUTO: 25.1 % (ref 35–47)
HGB BLD-MCNC: 7.6 G/DL (ref 11.7–15.7)
INR PPP: 0.98 (ref 0.86–1.14)
Lab: NORMAL
MAGNESIUM SERPL-MCNC: 2 MG/DL (ref 1.6–2.3)
MCH RBC QN AUTO: 31.4 PG (ref 26.5–33)
MCHC RBC AUTO-ENTMCNC: 30.3 G/DL (ref 31.5–36.5)
MCV RBC AUTO: 104 FL (ref 78–100)
PLATELET # BLD AUTO: 610 10E9/L (ref 150–450)
POTASSIUM SERPL-SCNC: 4.3 MMOL/L (ref 3.4–5.3)
RBC # BLD AUTO: 2.42 10E12/L (ref 3.8–5.2)
SODIUM SERPL-SCNC: 132 MMOL/L (ref 133–144)
SPECIMEN SOURCE: NORMAL
SPECIMEN SOURCE: NORMAL
TACROLIMUS BLD-MCNC: 5.5 UG/L (ref 5–15)
TME LAST DOSE: NORMAL H
WBC # BLD AUTO: 3.9 10E9/L (ref 4–11)

## 2018-12-04 PROCEDURE — 86832 HLA CLASS I HIGH DEFIN QUAL: CPT | Performed by: PHYSICIAN ASSISTANT

## 2018-12-04 PROCEDURE — 87329 GIARDIA AG IA: CPT | Performed by: INTERNAL MEDICINE

## 2018-12-04 PROCEDURE — 96361 HYDRATE IV INFUSION ADD-ON: CPT

## 2018-12-04 PROCEDURE — 36415 COLL VENOUS BLD VENIPUNCTURE: CPT | Performed by: PHYSICIAN ASSISTANT

## 2018-12-04 PROCEDURE — 87493 C DIFF AMPLIFIED PROBE: CPT | Performed by: INTERNAL MEDICINE

## 2018-12-04 PROCEDURE — G0463 HOSPITAL OUTPT CLINIC VISIT: HCPCS | Mod: 25

## 2018-12-04 PROCEDURE — 80048 BASIC METABOLIC PNL TOTAL CA: CPT | Performed by: PHYSICIAN ASSISTANT

## 2018-12-04 PROCEDURE — 86833 HLA CLASS II HIGH DEFIN QUAL: CPT | Performed by: PHYSICIAN ASSISTANT

## 2018-12-04 PROCEDURE — 25000128 H RX IP 250 OP 636: Mod: ZF | Performed by: INTERNAL MEDICINE

## 2018-12-04 PROCEDURE — 80197 ASSAY OF TACROLIMUS: CPT | Performed by: PHYSICIAN ASSISTANT

## 2018-12-04 PROCEDURE — 85610 PROTHROMBIN TIME: CPT | Performed by: PHYSICIAN ASSISTANT

## 2018-12-04 PROCEDURE — 83735 ASSAY OF MAGNESIUM: CPT | Performed by: PHYSICIAN ASSISTANT

## 2018-12-04 PROCEDURE — 87506 IADNA-DNA/RNA PROBE TQ 6-11: CPT | Performed by: INTERNAL MEDICINE

## 2018-12-04 PROCEDURE — 85027 COMPLETE CBC AUTOMATED: CPT | Performed by: PHYSICIAN ASSISTANT

## 2018-12-04 PROCEDURE — G0463 HOSPITAL OUTPT CLINIC VISIT: HCPCS | Mod: ZF

## 2018-12-04 PROCEDURE — 96374 THER/PROPH/DIAG INJ IV PUSH: CPT

## 2018-12-04 RX ORDER — ONDANSETRON 4 MG/1
4 TABLET, FILM COATED ORAL EVERY 12 HOURS PRN
Qty: 60 TABLET | Refills: 1 | Status: SHIPPED | OUTPATIENT
Start: 2018-12-04 | End: 2019-02-12

## 2018-12-04 RX ORDER — ONDANSETRON 2 MG/ML
8 INJECTION INTRAMUSCULAR; INTRAVENOUS ONCE
Status: CANCELLED
Start: 2018-12-04 | End: 2018-12-04

## 2018-12-04 RX ORDER — ONDANSETRON 2 MG/ML
8 INJECTION INTRAMUSCULAR; INTRAVENOUS ONCE
Status: COMPLETED | OUTPATIENT
Start: 2018-12-04 | End: 2018-12-04

## 2018-12-04 RX ADMIN — SODIUM CHLORIDE 2000 ML: 9 INJECTION, SOLUTION INTRAVENOUS at 15:38

## 2018-12-04 RX ADMIN — ONDANSETRON HYDROCHLORIDE 8 MG: 2 INJECTION, SOLUTION INTRAMUSCULAR; INTRAVENOUS at 15:38

## 2018-12-04 ASSESSMENT — PAIN SCALES - GENERAL
PAINLEVEL: NO PAIN (0)
PAINLEVEL: NO PAIN (0)

## 2018-12-04 NOTE — PATIENT INSTRUCTIONS
Patient Instructions  1. Have 2 liters of fluid today at 4:00 in the infusion center. They should give Zofran while you are getting fluids.   2. Get stool cultures today. Possible viral infection that is causing the stomach issues.   3. See the GI MD's next week.   4. Your bronchoscopy will be cancelled. This will be rescheduled.   5. Take Marinol to increase appetite.       Next transplant clinic appointment:  2 weeks with CXR, labs and PFTs      AVS printed at time of check out

## 2018-12-04 NOTE — PROGRESS NOTES
Transplant Coordinator Note    Reason for visit: Post lung transplant follow up visit   Coordinator: Present   Caregiver:      Health concerns addressed today:  1. Pt reports that she has been feeling nauseous and has been throwing up. She thinks that she has been able to keep her pills down.   2. Pt also reports loose, watery stools every 2 hours throughout the night.   3. Pt reports that her breathing has been ok. She does report coughing bouts.   4. Pt report that she is out of the Zofran, but is unsure if this is helping.   5. Pt has lost 10 lbs over the last month.   6. Stool sample to be obtained.   7. Bronchoscopy to be postponed d/t sickness.   8.     Activity/rehab:   Oxygen needs:   Pain management/RX:   Diabetic management:   Next Bronch due:   High risk donor:   CMV status: pt on Valcyte 450 mg daily.   Valcyte stopped:   DVT/PE:  Post op AFIB/follow up with EP:  AC/asa:   PJP prophylactic:     Pt Education: medications (use/dose/side effects), how/when to call coordinator, frequency of labs, s/s of infection/rejection, call prior to starting any new medications, lab/vital sign book    Health Maintenance:     Last colonoscopy:     Next colonoscopy due:     Dermatology:    Vaccinations this visit:     Labs, CXR, PFTs reviewed with patient  Medication record reviewed and reconciled  Questions and concerns addressed    Patient Instructions  1. Have 2 liters of fluid today at 4:00 in the infusion center. They should give Zofran while you are getting fluids.   2. Get stool cultures today. Possible viral infection that is causing the stomach issues.   3. See the GI MD's next week.   4. Your bronchoscopy will be cancelled. This will be rescheduled.    5. Take Marinol to increase appetite.       Next transplant clinic appointment:  2 weeks with CXR, labs and PFTs      AVS printed at time of check out

## 2018-12-04 NOTE — NURSING NOTE
"Chief Complaint   Patient presents with     RECHECK     S/P Lung TX 3/1/2018, not feeling well       /80 (BP Location: Right arm, Patient Position: Sitting, Cuff Size: Adult Small)  Pulse 87  Temp 98.1  F (36.7  C)  Resp 20  Ht 1.626 m (5' 4\")  Wt 45.7 kg (100 lb 11.2 oz)  LMP 06/07/2014 (Exact Date)  SpO2 100%  BMI 17.29 kg/m2    Gianna Rogers [facility]  12/4/2018 1:07 PM      "

## 2018-12-04 NOTE — LETTER
PHYSICIAN ORDERS      DATE & TIME ISSUED: 2018 3:12 PM  PATIENT NAME: Kecia Blue   : 1960     [facility] MR# [if applicable]: [id number]     DIAGNOSIS:  Lung Transplant  Z94.2 Dehydration E86.0     Kecia needs to have 1 liter of saline infused over 1 hour daily for 3 days for dehydration. Pt need a PIV placed. Please call the pt to set this up at [phone] (cell) or [phone] (home)     Any questions please call: Nasreen [phone]    Please fax these results to [phone].      .

## 2018-12-04 NOTE — PROGRESS NOTES
Orders for 1000 ml of NS X 3 days faxed to infusion center at [facility]. Fax # [phone]. Pt and  are aware of the plan.

## 2018-12-04 NOTE — LETTER
"12/4/2018       RE: Kecia Blue  [id number] [facility] [id number]     Dear Colleague,    Thank you for referring your patient, Kecia Blue, to the [facility] at [facility]. Please see a copy of my visit note below.    Reason for Visit  Kecia Blue is a 57-year-old female who is being seen for RECHECK (S/P Lung TX 3/1/2018, not feeling well)      Lung TX HPI  Kecia Blue is a 57-year-old female with a history of dermatomyositis, seronegative rheumatoid arthritis, interstitial lung disease, and pulmonary hypertension who was admitted to the hospital with acute worsening of chronic hypoxic respiratory failure in 02/2018 and progressed to [facility] for right heart failure and subsequently underwent bilateral sequential lung transplant on 03/01/2018.  Her post-transplant course was generally uncomplicated; however, she needed pleural effusion tap on the right side later.  She has had significant narrowing of right bronchial anastomosis with significant granulation tissue blocking the mainstem.  She has required multiple dilations and stent placement.       Interval HPI   The patient began feeling nauseated Sunday morning and has been vomiting since. No ill contacts. No irregular food. She ate a half piece of toast and soup on Jeffery morning at 5:00 AM and has not eaten anything since. The patient believes she has been able to keep her pills consumed because she has not noticed a pill form upon vomiting. No lightheadedness or dizziness.     The patient does endorse \"a lot\" of loose stools since Monday morning and has a bowel movement every two hours. No stomach pains or cramps. She does not believe her loose stools are similar to when she had C. difficile. Her stools are yellow and, when she had C difficile, her stools were brown. No lower extremity edema.     The patient's breathing has been \"okay\". No shortness of breath. She " "does have a cough, but no wheezing. Since last evaluation, she was fine and, after the procedure, her Prednisone was increased to 20 mg once a day. Everything was \"good until Saturday\". No fevers, chills, or sweats. No arthralgia or rashes. No headaches. She does have rhinorrhea.     The patient is unable to sleep secondary to vomiting and coughing. She ran out of her Zofran on Saturday evening. The patient recently finished Augmentin.      Current Outpatient Prescriptions   Medication     ACETAMINOPHEN PO     albuterol (PROAIR HFA/PROVENTIL HFA/VENTOLIN HFA) 108 (90 Base) MCG/ACT Inhaler     azaTHIOprine (IMURAN) 50 MG tablet     calcium-vitamin D (CALTRATE) 600-400 MG-UNIT per tablet     guaiFENesin-codeine (ROBITUSSIN AC) 100-10 MG/5ML SOLN solution     magnesium oxide (MAG-OX) 400 MG tablet     metoprolol tartrate (LOPRESSOR) 25 MG tablet     multivitamin, therapeutic with minerals (THERA-VIT-M) TABS tablet     pantoprazole (PROTONIX) 40 MG EC tablet     pentoxifylline (TRENTAL) 400 MG CR tablet     predniSONE (DELTASONE) 2.5 MG tablet     predniSONE (DELTASONE) 5 MG tablet     sulfamethoxazole-trimethoprim (BACTRIM DS/SEPTRA DS) 800-160 MG per tablet     tacrolimus (GENERIC EQUIVALENT) 1 MG capsule     valGANciclovir (VALCYTE) 450 MG tablet     ondansetron (ZOFRAN) 4 MG tablet     order for DME     order for DME     predniSONE (DELTASONE) 20 MG tablet     No current facility-administered medications for this visit.        No Known Allergies      Past Medical History:   Diagnosis Date     Antisynthetase syndrome (H) 2014     Chronic cough      Chronic infection     recent C diff  8/18     Dehydration 8/1/2018     Dermatomyositis (H)      Dysplasia of cervix, low grade (ESTRADA 1)      ILD (interstitial lung disease) (H)      Osteopenia      PONV (postoperative nausea and vomiting)      Pulmonary hypertension (H)      Raynaud's disease      Seronegative rheumatoid arthritis (H)        Past Surgical History: "   Procedure Laterality Date     BRONCHOSCOPY (RIGID OR FLEXIBLE), DIAGNOSTIC N/A 4/10/2018    Procedure: COMBINED BRONCHOSCOPY (RIGID OR FLEXIBLE), LAVAGE;;  Surgeon: Mariposa Donohue MD;  Location:  GI     BRONCHOSCOPY (RIGID OR FLEXIBLE), DILATE BRONCHUS / TRACHEA N/A 10/11/2018    Procedure: BRONCHOSCOPY (RIGID OR FLEXIBLE), DILATE BRONCHUS / TRACHEA;  Flexible And Rigid Bronchoscopy and Dilation;  Surgeon: Wilber Lin MD;  Location: [facility]     BRONCHOSCOPY FLEXIBLE N/A 3/13/2018    Procedure: BRONCHOSCOPY FLEXIBLE;  Flexible Bronchoscopy ;  Surgeon: Gissell Sanchez MD;  Location: [facility] GI     BRONCHOSCOPY FLEXIBLE N/A 5/9/2018    Procedure: BRONCHOSCOPY FLEXIBLE;;  Surgeon: Wilber Lin MD;  Location:  GI     BRONCHOSCOPY FLEXIBLE AND RIGID N/A 9/10/2018    Procedure: BRONCHOSCOPY FLEXIBLE AND RIGID;  Flexible and Rigid Bronchoscopy with Balloon Dilation, tissue debulking with cryo, and Right mainstem bronchus stent placement;  Surgeon: Wilber Lin MD;  Location: [facility] OR     BRONCHOSCOPY RIGID N/A 6/6/2018    Procedure: BRONCHOSCOPY RIGID;;  Surgeon: Lopez Macias MD;  Location:  GI     BRONCHOSCOPY, DILATE BRONCHUS, STENT BRONCHUS, COMBINED N/A 6/11/2018    Procedure: COMBINED BRONCHOSCOPY, DILATE BRONCHUS, STENT BRONCHUS;  Flexible Bronchoscopy, Balloon Dilation, Bronchial Washings;  Surgeon: Wilber Lin MD;  Location: [facility]     BRONCHOSCOPY, DILATE BRONCHUS, STENT BRONCHUS, COMBINED Right 7/10/2018    Procedure: COMBINED BRONCHOSCOPY, DILATE BRONCHUS, STENT BRONCHUS;  Flexible Bronchoscopy, Balloon Dilation, Bronchial Washings  ;  Surgeon: Wilber Lin MD;  Location: [facility]     BRONCHOSCOPY, DILATE BRONCHUS, STENT BRONCHUS, COMBINED N/A 8/2/2018    Procedure: COMBINED BRONCHOSCOPY, DILATE BRONCHUS, STENT BRONCHUS;  Flexible Bronchoscopy, Bronchial Washings, Balloon Dilation;  Surgeon: Wilber Lin MD;  Location:  OR      BRONCHOSCOPY, DILATE BRONCHUS, STENT BRONCHUS, COMBINED N/A 8/20/2018    Procedure: COMBINED BRONCHOSCOPY, DILATE BRONCHUS, STENT BRONCHUS;  Flexible Bronchoscopy, Balloon Dilation;  Surgeon: Wilber Lin MD;  [facility]     BRONCHOSCOPY, DILATE BRONCHUS, STENT BRONCHUS, COMBINED N/A 10/29/2018    Procedure: Flexible Bronchoscopy, Balloon Dilation, Stent Revision, Airway Examination And Therapeutic Suctioning, Cyro Tumor Debulking;  Surgeon: Wilber Lin MD;  [facility]     BRONCHOSCOPY, DILATE BRONCHUS, STENT BRONCHUS, COMBINED N/A 11/20/2018    Procedure: Rigid Bronchoscopy, Stent Removal and dilitation;  Surgeon: Wilber Lin MD;  Location:  OR     ENT SURGERY      tonsillectomy as a child     ESOPHAGOSCOPY, GASTROSCOPY, DUODENOSCOPY (EGD), COMBINED N/A 10/29/2018    Procedure: COMBINED ESOPHAGOSCOPY, GASTROSCOPY, DUODENOSCOPY (EGD) with biopsies and polypectomy;  Surgeon: Chente Bloom MD;  Location:  OR     INSERT EXTRACORPORAL MEMBRANE OXYGENATOR ADULT (OUTSIDE OR) N/A 2/27/2018    Procedure: INSERT EXTRACORPORAL MEMBRANE OXYGENATOR ADULT (OUTSIDE OR);  INSERT EXTRACORPORAL MEMBRANE OXYGENATOR ADULT (OUTSIDE OR) ;  Surgeon: Toby Hernandez MD;  Location:  OR     no prior surgery       REMOVE EXTRACORPORAL MEMBRANE OXYGENATOR ADULT N/A 3/3/2018    Procedure: REMOVE EXTRACORPORAL MEMBRANE OXYGENATOR ADULT;  Removal of Right Femoral Venous and Right Axillary Arterial Extracorporeal Membrane Oxygenator;  Surgeon: Toby Hernandez MD;  Location:  OR     TRANSPLANT LUNG RECIPIENT SINGLE X2 Bilateral 3/1/2018    Procedure: TRANSPLANT LUNG RECIPIENT SINGLE X2;  Median Sternotomy, Extracorporeal Membrane Oxygenator, bilateral sequential lung transplant;  Surgeon: Toby Hernandez MD;  Location:  OR       Social History     Social History     Marital status:      Spouse name: N/A     Number of children: N/A     Years of  "education: N/A     Occupational History     Not on file.     Social History Main Topics     Smoking status: Never Smoker     Smokeless tobacco: Never Used     Alcohol use No     Drug use: No     Sexual activity: Not on file     Other Topics Concern     Parent/Sibling W/ Cabg, Mi Or Angioplasty Before 65f 55m? No     Social History Narrative       Family History   Problem Relation Age of Onset     Hypertension Mother      Arthritis Mother      Pancreatic Cancer Father      Diabetes Father        Pulmonary ROS  Constitutional- Positive. Has lost 10 pounds in the last month.  Eyes- Negative  Ear, nose and throat- Positive. Rhinorrhea.  Cardiac- Negative  Pulm- See HPI  GI- Positive. Nausea and vomiting since Sunday morning. Loose stools about every two hours since Monday morning.  Genitourinary- Negative  Musculoskeletal- Positive. Bilateral knee pain with right knee edema.  Neurology- Negative  Dermatology- Negative  Endocrine- Negative  Lymphatic- Negative  Psychiatry- Negative  A complete ROS was otherwise negative except as noted in the HPI.      /80 (BP Location: Right arm, Patient Position: Sitting, Cuff Size: Adult Small)  Pulse 87  Temp 98.1  F (36.7  C)  Resp 20  Ht 1.626 m (5' 4\")  Wt 45.7 kg (100 lb 11.2 oz)  LMP 06/07/2014 (Exact Date)  SpO2 100%  BMI 17.29 kg/m2  Physical Exam  GENERAL APPEARANCE: Alert, Oriented x3. Not in distress.  EYES: PERRL, EOMI  HENT: Nasal mucosa with no hyperemia and no edema, no nasal polyps.  MOUTH: Oral mucosa is moist, without any lesions, no tonsillar enlargement, no oropharyngeal exudate.  NECK: Supple, no masses, no thyromegaly.  LYMPHATICS: No significant axillary, cervical, or supraclavicular nodes.  RESP: Normal percussion, good air flow throughout Left lung. Rt. lung insp wheeze heard and poor exhalation.  CV: Normal S1, S2, regular rhythm, normal rate, no rub, no murmur,  no gallop. Trace right lower extremity edema, no left lower extremity " edema.  ABDOMEN: Bowel sounds normal, soft, nontender, no HSM or masses.  MS: Extremities normal, no clubbing, no cyanosis.   SKIN: No rash on limited exam.  NEURO: Mentation intact, speech normal, normal strength and tone, normal gait, and stance.  PSYCH: Mentation appears normal, and affect normal/bright.      Results  Recent Results (from the past 168 hour(s))   Basic metabolic panel    Collection Time: 12/04/18 11:02 AM   Result Value Ref Range    Sodium 132 (L) 133 - 144 mmol/L    Potassium 4.3 3.4 - 5.3 mmol/L    Chloride 100 94 - 109 mmol/L    Carbon Dioxide 18 (L) 20 - 32 mmol/L    Anion Gap 14 3 - 14 mmol/L    Glucose 86 70 - 99 mg/dL    Urea Nitrogen 30 7 - 30 mg/dL    Creatinine 1.13 (H) 0.52 - 1.04 mg/dL    GFR Estimate 50 (L) >60 mL/min/1.7m2    GFR Estimate If Black 60 (L) >60 mL/min/1.7m2    Calcium 9.4 8.5 - 10.1 mg/dL   Magnesium    Collection Time: 12/04/18 11:02 AM   Result Value Ref Range    Magnesium 2.0 1.6 - 2.3 mg/dL   CBC with platelets    Collection Time: 12/04/18 11:02 AM   Result Value Ref Range    WBC 3.9 (L) 4.0 - 11.0 10e9/L    RBC Count 2.42 (L) 3.8 - 5.2 10e12/L    Hemoglobin 7.6 (L) 11.7 - 15.7 g/dL    Hematocrit 25.1 (L) 35.0 - 47.0 %     (H) 78 - 100 fl    MCH 31.4 26.5 - 33.0 pg    MCHC 30.3 (L) 31.5 - 36.5 g/dL    RDW 16.4 (H) 10.0 - 15.0 %    Platelet Count 610 (H) 150 - 450 10e9/L   INR    Collection Time: 12/04/18 11:02 AM   Result Value Ref Range    INR 0.98 0.86 - 1.14   General PFT Lab (Please always keep checked)    Collection Time: 12/04/18 11:21 AM   Result Value Ref Range    FVC-Pred 3.23 L    FVC-Pre 1.84 L    FVC-%Pred-Pre 56 %    FEV1-Pre 0.98 L    FEV1-%Pred-Pre 37 %    FEV1FVC-Pred 79 %    FEV1FVC-Pre 53 %    FEFMax-Pred 6.42 L/sec    FEFMax-Pre 1.94 L/sec    FEFMax-%Pred-Pre 30 %    FEF2575-Pred 2.43 L/sec    FEF2575-Pre 0.58 L/sec    EEX6514-%Pred-Pre 23 %    ExpTime-Pre 7.49 sec    FIFMax-Pre 2.33 L/sec    FEV1FEV6-Pred 81 %    FEV1FEV6-Pre 53 %         Results as noted above.    PFT Interpretation:  Severe obstructive ventilatory defect along with severe restriction.  FEV1 has improved by 440ml.  FEV1 remains below best of 1.27L.  Valid Maneuver    CXR (12/4/2018), personally reviewed by me: The Lung fields are clear. No effusion. Cardiac silhouette is WNL.      Assessment and Plan: Kecia Blue is a 57-year-old female with a history of dermatomyositis, seronegative rheumatoid arthritis, interstitial lung disease, and pulmonary hypertension who was admitted to the hospital with acute worsening of chronic hypoxic respiratory failure in 02/2018 and progressed [facility] for right heart failure and subsequently underwent bilateral sequential lung transplant on 03/01/2018. Her post-transplant course was generally uncomplicated; however, she needed pleural effusion tap on the right side later. She has had significant narrowing of right bronchial anastomosis with significant granulation tissue blocking the mainstem.  She has required multiple dilations and stent placement.     # Bilateral Lung Transplant: She severe restriction with low PFT's. Etiology unclear - ?chest wall restriction. Possible diaphram weakness/paralysis. Deconditioning might be playing a role.  Current IS regimen:   - Tacrolimus with a goal of 10-15 nanograms/mL, AZA 150mg QHS and prednisone. Off MMF due to recurrent N/V    DSA positive with DQB7 previously. Last DSA checked was neg (11/19/18).    12/4/2018: FEV1 has improved but not back to baseline. CXR is stable. Besides rhinorrhea, her pulmonary symptoms are stable; however, she has nausea, vomiting, and loose stools, which developed from Sunday morning. We will continue current IS regimen for now.    # Right Main Bronchial Stenosis S/P Dilatation: Granulation tissue at the Rt. mainstem opening - it was removed. Last Dilation with stent placement on 10/29/18  12/4/2018: Cont Saline nebs BID. It was scheduled this week but we should postpone  it given pt's illness.   - FEV1 has improved but not back to baseline. Lung exam is good - not suggestive of stenosis. Imaging is stable.    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 On Valcyte 450mg/day. Previous bronchs have had a significantly elevated CMV level.   12/4/2018: Bronch washing/BAL CMV improving - last done on 11/20/18. CMV in plasma is low level based on external lab but was negative from our lab on 11/19/18. For now we will continue Valcyte untill both are negative. Last spike in plasma level on 7/20/18.  H/o C. diff colitis: Finished a 10day course of Oral Vancomycin.  High risk donor: Will need labs in 12 months.    # Provoked Left Upper Extremity Clot and Right IJ Clot (3/7/18): completed 6months of anticoag (Coumadin).    # Dermatomyositis with Raynaud's Phenomenon: Followed by rheumatology.  - Myositis panel is positive.    # Hoarse Voice: She does complain of some hoarseness and is scheduled to see ENT, has vocal cord polyp seen at there last bronch.    # CKD, Stage III: Cr is stable despite significant N/V/D.   - Will follow for now.  # Hypomagnesemia: The patient remains on magnesium replacement. Her magnesium level today is acceptable.    # Anemia: with hgb of 7.6 down from baseline 8-9s. No s/s of bleeding. Received 1 U PRBC during admission, suspect possible occult bleeding from C diff. Hgb is stable at 7.5. Iron panel, folate/B12 WNL.  - Monitor    # Nausea/Vomitting and Early Satiety: Worsened with the Bactrim. CT abd pelvis on 8/20/18 mildly dilated loops of SB.  On PPI BID.  EGD on 10/29/18 - multiple gastric polyps. Normal esophagus. Duodenal bx was consistent with MMF toxicity.  10/9/2018: She continues to have a poor appetite. On Reglan BID, the nausea has not been the primary limiting  factor.   - Prescribed Marinol 5 mg twice a day.  12/4/2018: She has been off MMF since the last month for recurrent vomitting. Currently off Reglan and Marinol was stopped. She is out of Zofran. May trial Marinol again to determine if this helps with weight loss.    Given the prolongation of her current symptoms, it is unlikely she has developed a food borne illness. I suspect she may have viral syndrome, but will continue to monitor closely. Renal function and vitals are stable and, at this time, I am not concerned for any urgent/emergent etiologies. However, I have recommended that she seek urgent/emergent care if she is unable to keep her medications consumed and/or if her symptoms worsen.    -  Will give IVF 2L today and 1L daily till her sx resolve. IV zofran 8mg x1 today. Zofran script refilled.    - Given continuation of weight loss, will continue to have her follow-up with gastroenterology as scheduled next week to address - chronic nausea and recurrent vomiting.  W/u - CMV is pending (less likely etiology), C diff ordered (was recently on augmentin and has h/o C.diff), Viral infection (Enteric panel ordered). Will check LFT's, Amylase and lipase pt is on Imuran though less likely to be the cause.    # HTN: Well controlled on Metoprolol 25mg BID.    # Bilateral Knee Pain: She has bilateral knee pain, which is due to arthritis, per patient. She received a Cortisone injection in left knee recently and this helped her left knee pain and edema. Right knee is painful and edematous today (10/9/2018).  - If this continues to be a problem might benefit from Orthopedic input.    # Health Care Maintenance: She has had influenza vaccination for 2018/2019 season.  12/4/2018: Consider beginning Shingrix series and TDaP Booster at follow-up.      RTC in 3 to 4 weeks with labs including spirometry and xray due to drop in FEV1.      Scribe Disclosure:   I, Jacinto Duong, am serving as a scribe; to document services  personally performed by Tarik Christensen MD based on data collection and the provider's statements to me.     Provider Disclosure:  I agree with above History, Review of Systems, Physical exam and Plan. I have reviewed the content of the documentation and have edited it as needed. I have personally performed the services documented here and the documentation accurately represents those services and the decisions I have made.      Electronically signed by:  Tarik Christensen MD.    Transplant Coordinator Note    Reason for visit: Post lung transplant follow up visit   Coordinator: Present   Caregiver:      Health concerns addressed today:  1. Pt reports that she has been feeling nauseous and has been throwing up. She thinks that she has been able to keep her pills down.   2. Pt also reports loose, watery stools every 2 hours throughout the night.   3. Pt reports that her breathing has been ok. She does report coughing bouts.   4. Pt report that she is out of the Zofran, but is unsure if this is helping.   5. Pt has lost 10 lbs over the last month.   6. Stool sample to be obtained.   7. Bronchoscopy to be postponed d/t sickness.   8.     Activity/rehab:   Oxygen needs:   Pain management/RX:   Diabetic management:   Next Bronch due:   High risk donor:   CMV status: pt on Valcyte 450 mg daily.   Valcyte stopped:   DVT/PE:  Post op AFIB/follow up with EP:  AC/asa:   PJP prophylactic:     Pt Education: medications (use/dose/side effects), how/when to call coordinator, frequency of labs, s/s of infection/rejection, call prior to starting any new medications, lab/vital sign book    Health Maintenance:     Last colonoscopy:     Next colonoscopy due:     Dermatology:    Vaccinations this visit:     Labs, CXR, PFTs reviewed with patient  Medication record reviewed and reconciled  Questions and concerns addressed    Patient Instructions  1. Have 2 liters of fluid today at 4:00 in the infusion center. They should give Zofran  while you are getting fluids.   2. Get stool cultures today. Possible viral infection that is causing the stomach issues.   3. See the GI MD's next week.   4. Your bronchoscopy will be cancelled. This will be rescheduled.    5. Take Marinol to increase appetite.       Next transplant clinic appointment:  2 weeks with CXR, labs and PFTs      AVS printed at time of check out    Sincerely,    Tarik Christensen MD

## 2018-12-04 NOTE — LETTER
"12/4/2018      RE: Kecia Blue  [id number] [place]  [facility] [id number]       Reason for Visit  Kecia Blue is a 57-year-old female who is being seen for RECHECK (S/P Lung TX 3/1/2018, not feeling well)      Lung TX HPI  Kecia Blue is a 57-year-old female with a history of dermatomyositis, seronegative rheumatoid arthritis, interstitial lung disease, and pulmonary hypertension who was admitted to the hospital with acute worsening of chronic hypoxic respiratory failure in 02/2018 and progressed to [facility] for right heart failure and subsequently underwent bilateral sequential lung transplant on 03/01/2018.  Her post-transplant course was generally uncomplicated; however, she needed pleural effusion tap on the right side later.  She has had significant narrowing of right bronchial anastomosis with significant granulation tissue blocking the mainstem.  She has required multiple dilations and stent placement.       Interval HPI   The patient began feeling nauseated Jeffery morning and has been vomiting since. No ill contacts. No irregular food. She ate a half piece of toast and soup on Jeffery morning at 5:00 AM and has not eaten anything since. The patient believes she has been able to keep her pills consumed because she has not noticed a pill form upon vomiting. No lightheadedness or dizziness.     The patient does endorse \"a lot\" of loose stools since Monday morning and has a bowel movement every two hours. No stomach pains or cramps. She does not believe her loose stools are similar to when she had C. difficile. Her stools are yellow and, when she had C difficile, her stools were brown. No lower extremity edema.     The patient's breathing has been \"okay\". No shortness of breath. She does have a cough, but no wheezing. Since last evaluation, she was fine and, after the procedure, her Prednisone was increased to 20 mg once a day. Everything was \"good until Saturday\". No fevers, chills, or " sweats. No arthralgia or rashes. No headaches. She does have rhinorrhea.     The patient is unable to sleep secondary to vomiting and coughing. She ran out of her Zofran on Saturday evening. The patient recently finished Augmentin.      Current Outpatient Prescriptions   Medication     ACETAMINOPHEN PO     albuterol (PROAIR HFA/PROVENTIL HFA/VENTOLIN HFA) 108 (90 Base) MCG/ACT Inhaler     azaTHIOprine (IMURAN) 50 MG tablet     calcium-vitamin D (CALTRATE) 600-400 MG-UNIT per tablet     guaiFENesin-codeine (ROBITUSSIN AC) 100-10 MG/5ML SOLN solution     magnesium oxide (MAG-OX) 400 MG tablet     metoprolol tartrate (LOPRESSOR) 25 MG tablet     multivitamin, therapeutic with minerals (THERA-VIT-M) TABS tablet     pantoprazole (PROTONIX) 40 MG EC tablet     pentoxifylline (TRENTAL) 400 MG CR tablet     predniSONE (DELTASONE) 2.5 MG tablet     predniSONE (DELTASONE) 5 MG tablet     sulfamethoxazole-trimethoprim (BACTRIM DS/SEPTRA DS) 800-160 MG per tablet     tacrolimus (GENERIC EQUIVALENT) 1 MG capsule     valGANciclovir (VALCYTE) 450 MG tablet     ondansetron (ZOFRAN) 4 MG tablet     order for DME     order for DME     predniSONE (DELTASONE) 20 MG tablet     No current facility-administered medications for this visit.        No Known Allergies      Past Medical History:   Diagnosis Date     Antisynthetase syndrome (H) 2014     Chronic cough      Chronic infection     recent C diff  8/18     Dehydration 8/1/2018     Dermatomyositis (H)      Dysplasia of cervix, low grade (ESTRADA 1)      ILD (interstitial lung disease) (H)      Osteopenia      PONV (postoperative nausea and vomiting)      Pulmonary hypertension (H)      Raynaud's disease      Seronegative rheumatoid arthritis (H)        Past Surgical History:   Procedure Laterality Date     BRONCHOSCOPY (RIGID OR FLEXIBLE), DIAGNOSTIC N/A 4/10/2018    Procedure: COMBINED BRONCHOSCOPY (RIGID OR FLEXIBLE), LAVAGE;;  Surgeon: Mariposa Donohue MD;  [facility]: [facility]      BRONCHOSCOPY (RIGID OR FLEXIBLE), DILATE BRONCHUS / TRACHEA N/A 10/11/2018    Procedure: BRONCHOSCOPY (RIGID OR FLEXIBLE), DILATE BRONCHUS / TRACHEA;  Flexible And Rigid Bronchoscopy and Dilation;  Surgeon: Wilber Lin MD;  Location: [facility]     BRONCHOSCOPY FLEXIBLE N/A 3/13/2018    Procedure: BRONCHOSCOPY FLEXIBLE;  Flexible Bronchoscopy ;  Surgeon: Gissell Sanchez MD;  Location: [facility]     BRONCHOSCOPY FLEXIBLE N/A 5/9/2018    Procedure: BRONCHOSCOPY FLEXIBLE;;  Surgeon: Wilber Lin MD;  Location: [facility]     BRONCHOSCOPY FLEXIBLE AND RIGID N/A 9/10/2018    Procedure: BRONCHOSCOPY FLEXIBLE AND RIGID;  Flexible and Rigid Bronchoscopy with Balloon Dilation, tissue debulking with cryo, and Right mainstem bronchus stent placement;  Surgeon: Wilber Lin MD;  Location: [facility]     BRONCHOSCOPY RIGID N/A 6/6/2018    Procedure: BRONCHOSCOPY RIGID;;  Surgeon: Lopez Macias MD;  Location: [facility]     BRONCHOSCOPY, DILATE BRONCHUS, STENT BRONCHUS, COMBINED N/A 6/11/2018    Procedure: COMBINED BRONCHOSCOPY, DILATE BRONCHUS, STENT BRONCHUS;  Flexible Bronchoscopy, Balloon Dilation, Bronchial Washings;  Surgeon: Wilber Lin MD;  [facility]: [facility]     BRONCHOSCOPY, DILATE BRONCHUS, STENT BRONCHUS, COMBINED Right 7/10/2018    Procedure: COMBINED BRONCHOSCOPY, DILATE BRONCHUS, STENT BRONCHUS;  Flexible Bronchoscopy, Balloon Dilation, Bronchial Washings  ;  Surgeon: Wilber Lin MD;  [facility]: [facility]     BRONCHOSCOPY, DILATE BRONCHUS, STENT BRONCHUS, COMBINED N/A 8/2/2018    Procedure: COMBINED BRONCHOSCOPY, DILATE BRONCHUS, STENT BRONCHUS;  Flexible Bronchoscopy, Bronchial Washings, Balloon Dilation;  Surgeon: Wilber Lin MD;  Location: [facility]     BRONCHOSCOPY, DILATE BRONCHUS, STENT BRONCHUS, COMBINED N/A 8/20/2018    Procedure: COMBINED BRONCHOSCOPY, DILATE BRONCHUS, STENT BRONCHUS;  Flexible Bronchoscopy, Balloon Dilation;  Surgeon: Wilber Lin MD;   [facility]: [facility]     BRONCHOSCOPY, DILATE BRONCHUS, STENT BRONCHUS, COMBINED N/A 10/29/2018    Procedure: Flexible Bronchoscopy, Balloon Dilation, Stent Revision, Airway Examination And Therapeutic Suctioning, Cyro Tumor Debulking;  Surgeon: Wilber Lin MD;  Location: [facility]     BRONCHOSCOPY, DILATE BRONCHUS, STENT BRONCHUS, COMBINED N/A 11/20/2018    Procedure: Rigid Bronchoscopy, Stent Removal and dilitation;  Surgeon: Wilber Lin MD;  [facility]     ENT SURGERY      tonsillectomy as a child     ESOPHAGOSCOPY, GASTROSCOPY, DUODENOSCOPY (EGD), COMBINED N/A 10/29/2018    Procedure: COMBINED ESOPHAGOSCOPY, GASTROSCOPY, DUODENOSCOPY (EGD) with biopsies and polypectomy;  Surgeon: Chente Bloom MD;  [facility]     INSERT EXTRACORPORAL MEMBRANE OXYGENATOR ADULT (OUTSIDE OR) N/A 2/27/2018    Procedure: INSERT EXTRACORPORAL MEMBRANE OXYGENATOR ADULT (OUTSIDE OR);  INSERT EXTRACORPORAL MEMBRANE OXYGENATOR ADULT (OUTSIDE OR) ;  Surgeon: Toby Hernandez MD;  Location: [facility]     no prior surgery       REMOVE EXTRACORPORAL MEMBRANE OXYGENATOR ADULT N/A 3/3/2018    Procedure: REMOVE EXTRACORPORAL MEMBRANE OXYGENATOR ADULT;  Removal of Right Femoral Venous and Right Axillary Arterial Extracorporeal Membrane Oxygenator;  Surgeon: Toby Hernandez MD;  Location:  OR     TRANSPLANT LUNG RECIPIENT SINGLE X2 Bilateral 3/1/2018    Procedure: TRANSPLANT LUNG RECIPIENT SINGLE X2;  Median Sternotomy, Extracorporeal Membrane Oxygenator, bilateral sequential lung transplant;  Surgeon: Toby Hernandez MD;  Location:  OR       Social History     Social History     Marital status:      Spouse name: N/A     Number of children: N/A     Years of education: N/A     Occupational History     Not on file.     Social History Main Topics     Smoking status: Never Smoker     Smokeless tobacco: Never Used     Alcohol use No     Drug use: No     Sexual activity: Not on  "file     Other Topics Concern     Parent/Sibling W/ Cabg, Mi Or Angioplasty Before 65f 55m? No     Social History Narrative       Family History   Problem Relation Age of Onset     Hypertension Mother      Arthritis Mother      Pancreatic Cancer Father      Diabetes Father        Pulmonary ROS  Constitutional- Positive. Has lost 10 pounds in the last month.  Eyes- Negative  Ear, nose and throat- Positive. Rhinorrhea.  Cardiac- Negative  Pulm- See HPI  GI- Positive. Nausea and vomiting since Sunday morning. Loose stools about every two hours since Monday morning.  Genitourinary- Negative  Musculoskeletal- Positive. Bilateral knee pain with right knee edema.  Neurology- Negative  Dermatology- Negative  Endocrine- Negative  Lymphatic- Negative  Psychiatry- Negative  A complete ROS was otherwise negative except as noted in the HPI.      /80 (BP Location: Right arm, Patient Position: Sitting, Cuff Size: Adult Small)  Pulse 87  Temp 98.1  F (36.7  C)  Resp 20  Ht 1.626 m (5' 4\")  Wt 45.7 kg (100 lb 11.2 oz)  LMP 06/07/2014 (Exact Date)  SpO2 100%  BMI 17.29 kg/m2  Physical Exam  GENERAL APPEARANCE: Alert, Oriented x3. Not in distress.  EYES: PERRL, EOMI  HENT: Nasal mucosa with no hyperemia and no edema, no nasal polyps.  MOUTH: Oral mucosa is moist, without any lesions, no tonsillar enlargement, no oropharyngeal exudate.  NECK: Supple, no masses, no thyromegaly.  LYMPHATICS: No significant axillary, cervical, or supraclavicular nodes.  RESP: Normal percussion, good air flow throughout Left lung. Rt. lung insp wheeze heard and poor exhalation.  CV: Normal S1, S2, regular rhythm, normal rate, no rub, no murmur,  no gallop. Trace right lower extremity edema, no left lower extremity edema.  ABDOMEN: Bowel sounds normal, soft, nontender, no HSM or masses.  MS: Extremities normal, no clubbing, no cyanosis.   SKIN: No rash on limited exam.  NEURO: Mentation intact, speech normal, normal strength and tone, normal " gait, and stance.  PSYCH: Mentation appears normal, and affect normal/bright.      Results  Recent Results (from the past 168 hour(s))   Basic metabolic panel    Collection Time: 12/04/18 11:02 AM   Result Value Ref Range    Sodium 132 (L) 133 - 144 mmol/L    Potassium 4.3 3.4 - 5.3 mmol/L    Chloride 100 94 - 109 mmol/L    Carbon Dioxide 18 (L) 20 - 32 mmol/L    Anion Gap 14 3 - 14 mmol/L    Glucose 86 70 - 99 mg/dL    Urea Nitrogen 30 7 - 30 mg/dL    Creatinine 1.13 (H) 0.52 - 1.04 mg/dL    GFR Estimate 50 (L) >60 mL/min/1.7m2    GFR Estimate If Black 60 (L) >60 mL/min/1.7m2    Calcium 9.4 8.5 - 10.1 mg/dL   Magnesium    Collection Time: 12/04/18 11:02 AM   Result Value Ref Range    Magnesium 2.0 1.6 - 2.3 mg/dL   CBC with platelets    Collection Time: 12/04/18 11:02 AM   Result Value Ref Range    WBC 3.9 (L) 4.0 - 11.0 10e9/L    RBC Count 2.42 (L) 3.8 - 5.2 10e12/L    Hemoglobin 7.6 (L) 11.7 - 15.7 g/dL    Hematocrit 25.1 (L) 35.0 - 47.0 %     (H) 78 - 100 fl    MCH 31.4 26.5 - 33.0 pg    MCHC 30.3 (L) 31.5 - 36.5 g/dL    RDW 16.4 (H) 10.0 - 15.0 %    Platelet Count 610 (H) 150 - 450 10e9/L   INR    Collection Time: 12/04/18 11:02 AM   Result Value Ref Range    INR 0.98 0.86 - 1.14   General PFT Lab (Please always keep checked)    Collection Time: 12/04/18 11:21 AM   Result Value Ref Range    FVC-Pred 3.23 L    FVC-Pre 1.84 L    FVC-%Pred-Pre 56 %    FEV1-Pre 0.98 L    FEV1-%Pred-Pre 37 %    FEV1FVC-Pred 79 %    FEV1FVC-Pre 53 %    FEFMax-Pred 6.42 L/sec    FEFMax-Pre 1.94 L/sec    FEFMax-%Pred-Pre 30 %    FEF2575-Pred 2.43 L/sec    FEF2575-Pre 0.58 L/sec    RZK4080-%Pred-Pre 23 %    ExpTime-Pre 7.49 sec    FIFMax-Pre 2.33 L/sec    FEV1FEV6-Pred 81 %    FEV1FEV6-Pre 53 %        Results as noted above.    PFT Interpretation:  Severe obstructive ventilatory defect along with severe restriction.  FEV1 has improved by 440ml.  FEV1 remains below best of 1.27L.  Valid Maneuver    CXR (12/4/2018), personally  reviewed by me: The Lung fields are clear. No effusion. Cardiac silhouette is WNL.      Assessment and Plan: Kecia Blue is a 57-year-old female with a history of dermatomyositis, seronegative rheumatoid arthritis, interstitial lung disease, and pulmonary hypertension who was admitted to the hospital with acute worsening of chronic hypoxic respiratory failure in 02/2018 and progressed [facility] for right heart failure and subsequently underwent bilateral sequential lung transplant on 03/01/2018. Her post-transplant course was generally uncomplicated; however, she needed pleural effusion tap on the right side later. She has had significant narrowing of right bronchial anastomosis with significant granulation tissue blocking the mainstem.  She has required multiple dilations and stent placement.     # Bilateral Lung Transplant: She severe restriction with low PFT's. Etiology unclear - ?chest wall restriction. Possible diaphram weakness/paralysis. Deconditioning might be playing a role.  Current IS regimen:   - Tacrolimus with a goal of 10-15 nanograms/mL, AZA 150mg QHS and prednisone. Off MMF due to recurrent N/V    DSA positive with DQB7 previously. Last DSA checked was neg (11/19/18).    12/4/2018: FEV1 has improved but not back to baseline. CXR is stable. Besides rhinorrhea, her pulmonary symptoms are stable; however, she has nausea, vomiting, and loose stools, which developed from Sunday morning. We will continue current IS regimen for now.    # Right Main Bronchial Stenosis S/P Dilatation: Granulation tissue at the Rt. mainstem opening - it was removed. Last Dilation with stent placement on 10/29/18  12/4/2018: Cont Saline nebs BID. It was scheduled this week but we should postpone it given pt's illness.   - FEV1 has improved but not back to baseline. Lung exam is good - not suggestive of stenosis. Imaging is stable.    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 On Valcyte 450mg/day. Previous bronchs have had a significantly elevated CMV level.   12/4/2018: Bronch washing/BAL CMV improving - last done on 11/20/18. CMV in plasma is low level based on external lab but was negative from our lab on 11/19/18. For now we will continue Valcyte untill both are negative. Last spike in plasma level on 7/20/18.  H/o C. diff colitis: Finished a 10day course of Oral Vancomycin.  High risk donor: Will need labs in 12 months.    # Provoked Left Upper Extremity Clot and Right IJ Clot (3/7/18): completed 6months of anticoag (Coumadin).    # Dermatomyositis with Raynaud's Phenomenon: Followed by rheumatology.  - Myositis panel is positive.    # Hoarse Voice: She does complain of some hoarseness and is scheduled to see ENT, has vocal cord polyp seen at there last bronch.    # CKD, Stage III: Cr is stable despite significant N/V/D.   - Will follow for now.  # Hypomagnesemia: The patient remains on magnesium replacement. Her magnesium level today is acceptable.    # Anemia: with hgb of 7.6 down from baseline 8-9s. No s/s of bleeding. Received 1 U PRBC during admission, suspect possible occult bleeding from C diff. Hgb is stable at 7.5. Iron panel, folate/B12 WNL.  - Monitor    # Nausea/Vomitting and Early Satiety: Worsened with the Bactrim. CT abd pelvis on 8/20/18 mildly dilated loops of SB.  On PPI BID.  EGD on 10/29/18 - multiple gastric polyps. Normal esophagus. Duodenal bx was consistent with MMF toxicity.  10/9/2018: She continues to have a poor appetite. On Reglan BID, the nausea has not been the primary limiting factor.   - Prescribed Marinol 5 mg twice a day.  12/4/2018: She has been off MMF since the last month for recurrent vomitting. Currently off Reglan and Marinol was stopped. She is out of Zofran. May trial Marinol again to determine if  this helps with weight loss.    Given the prolongation of her current symptoms, it is unlikely she has developed a food borne illness. I suspect she may have viral syndrome, but will continue to monitor closely. Renal function and vitals are stable and, at this time, I am not concerned for any urgent/emergent etiologies. However, I have recommended that she seek urgent/emergent care if she is unable to keep her medications consumed and/or if her symptoms worsen.    -  Will give IVF 2L today and 1L daily till her sx resolve. IV zofran 8mg x1 today. Zofran script refilled.    - Given continuation of weight loss, will continue to have her follow-up with gastroenterology as scheduled next week to address - chronic nausea and recurrent vomiting.  W/u - CMV is pending (less likely etiology), C diff ordered (was recently on augmentin and has h/o C.diff), Viral infection (Enteric panel ordered). Will check LFT's, Amylase and lipase pt is on Imuran though less likely to be the cause.    # HTN: Well controlled on Metoprolol 25mg BID.    # Bilateral Knee Pain: She has bilateral knee pain, which is due to arthritis, per patient. She received a Cortisone injection in left knee recently and this helped her left knee pain and edema. Right knee is painful and edematous today (10/9/2018).  - If this continues to be a problem might benefit from Orthopedic input.    # Health Care Maintenance: She has had influenza vaccination for 2018/2019 season.  12/4/2018: Consider beginning Shingrix series and TDaP Booster at follow-up.      RTC in 3 to 4 weeks with labs including spirometry and xray due to drop in FEV1.      Scribe Disclosure:   I, Jacinto Duong, am serving as a scribe; to document services personally performed by Tarik Christensen MD based on data collection and the provider's statements to me.     Provider Disclosure:  I agree with above History, Review of Systems, Physical exam and Plan. I have reviewed the content of the  documentation and have edited it as needed. I have personally performed the services documented here and the documentation accurately represents those services and the decisions I have made.      Electronically signed by:  Tarik Christensen MD.    Transplant Coordinator Note    Reason for visit: Post lung transplant follow up visit   Coordinator: Present   Caregiver:      Health concerns addressed today:  1. Pt reports that she has been feeling nauseous and has been throwing up. She thinks that she has been able to keep her pills down.   2. Pt also reports loose, watery stools every 2 hours throughout the night.   3. Pt reports that her breathing has been ok. She does report coughing bouts.   4. Pt report that she is out of the Zofran, but is unsure if this is helping.   5. Pt has lost 10 lbs over the last month.   6. Stool sample to be obtained.   7. Bronchoscopy to be postponed d/t sickness.   8.     Activity/rehab:   Oxygen needs:   Pain management/RX:   Diabetic management:   Next Bronch due:   High risk donor:   CMV status: pt on Valcyte 450 mg daily.   Valcyte stopped:   DVT/PE:  Post op AFIB/follow up with EP:  AC/asa:   PJP prophylactic:     Pt Education: medications (use/dose/side effects), how/when to call coordinator, frequency of labs, s/s of infection/rejection, call prior to starting any new medications, lab/vital sign book    Health Maintenance:     Last colonoscopy:     Next colonoscopy due:     Dermatology:    Vaccinations this visit:     Labs, CXR, PFTs reviewed with patient  Medication record reviewed and reconciled  Questions and concerns addressed    Patient Instructions  1. Have 2 liters of fluid today at 4:00 in the infusion center. They should give Zofran while you are getting fluids.   2. Get stool cultures today. Possible viral infection that is causing the stomach issues.   3. See the GI MD's next week.   4. Your bronchoscopy will be cancelled. This will be rescheduled.    5. Take  Marinol to increase appetite.       Next transplant clinic appointment:  2 weeks with CXR, labs and PFTs      AVS printed at time of check out        Tarik Christensen MD

## 2018-12-04 NOTE — TELEPHONE ENCOUNTER
Patient Call: General  Route to LPN    Reason for call:  called  Stated pt feels very dehydrated- coming in for appointment with Dr Christensen- wonders if she needs IV fluids    Call back needed? Yes    Return Call Needed  Same as documented in contacts section  When to return call?: Greater than one day: Route standard priority

## 2018-12-04 NOTE — PROGRESS NOTES
Nursing Note  Kecia Blue presents today to [facility] for:   Chief Complaint   Patient presents with     Infusion     IV Fluids     During today's [facility] appointment, orders from Dr. Christensen were completed.  Frequency: as needed    Progress note:  Patient identification verified by name and date of birth.  Assessment completed.  Vitals recorded in Doc Flowsheets.  Patient was provided with education regarding infusion and possible side effects.  Patient verbalized understanding.      needed: No  Premedications: were not ordered.  Infusion Rates: 999 ml/hr.  Approximate Infusion length:2 hours.   Labs: were not ordered for this appointment.  Vascular access: peripheral IV placed today.  Treatment Conditions: non-applicable.  Patient tolerated infusion: well.    Drug Waste Record? No     Discharge Plan:   Follow up plan of care with: ongoing infusions at [facility]. and primary medical doctor.  Discharge instructions were reviewed with patient.  Patient/representative verbalized understanding of discharge instructions and all questions answered.  Patient discharged from [facility] in stable condition.    Vibha Collins RN    Administrations This Visit     0.9% sodium chloride BOLUS     Admin Date Action Dose Rate Route Administered By          12/04/2018 New Bag 2000 mL 1,000 mL/hr Intravenous Vibha Collins RN                   ondansetron (ZOFRAN) injection 8 mg     Admin Date Action Dose Route Administered By             12/04/2018 Given 8 mg Intravenous Vibha Collins RN                          /84  Pulse 79  Temp 98.4  F (36.9  C) (Oral)  LMP 06/07/2014 (Exact Date)

## 2018-12-04 NOTE — PROGRESS NOTES
"Reason for Visit  Kecia Blue is a 57-year-old female who is being seen for RECHECK (S/P Lung TX 3/1/2018, not feeling well)      Lung TX HPI  Kecia Blue is a 57-year-old female with a history of dermatomyositis, seronegative rheumatoid arthritis, interstitial lung disease, and pulmonary hypertension who was admitted to the hospital with acute worsening of chronic hypoxic respiratory failure in 02/2018 and progressed to [facility] for right heart failure and subsequently underwent bilateral sequential lung transplant on 03/01/2018.  Her post-transplant course was generally uncomplicated; however, she needed pleural effusion tap on the right side later.  She has had significant narrowing of right bronchial anastomosis with significant granulation tissue blocking the mainstem.  She has required multiple dilations and stent placement.       Interval HPI   The patient began feeling nauseated Sunday morning and has been vomiting since. No ill contacts. No irregular food. She ate a half piece of toast and soup on Jeffery morning at 5:00 AM and has not eaten anything since. The patient believes she has been able to keep her pills consumed because she has not noticed a pill form upon vomiting. No lightheadedness or dizziness.     The patient does endorse \"a lot\" of loose stools since Monday morning and has a bowel movement every two hours. No stomach pains or cramps. She does not believe her loose stools are similar to when she had C. difficile. Her stools are yellow and, when she had C difficile, her stools were brown. No lower extremity edema.     The patient's breathing has been \"okay\". No shortness of breath. She does have a cough, but no wheezing. Since last evaluation, she was fine and, after the procedure, her Prednisone was increased to 20 mg once a day. Everything was \"good until Saturday\". No fevers, chills, or sweats. No arthralgia or rashes. No headaches. She does have rhinorrhea.     The patient is " unable to sleep secondary to vomiting and coughing. She ran out of her Zofran on Saturday evening. The patient recently finished Augmentin.      Current Outpatient Prescriptions   Medication     ACETAMINOPHEN PO     albuterol (PROAIR HFA/PROVENTIL HFA/VENTOLIN HFA) 108 (90 Base) MCG/ACT Inhaler     azaTHIOprine (IMURAN) 50 MG tablet     calcium-vitamin D (CALTRATE) 600-400 MG-UNIT per tablet     guaiFENesin-codeine (ROBITUSSIN AC) 100-10 MG/5ML SOLN solution     magnesium oxide (MAG-OX) 400 MG tablet     metoprolol tartrate (LOPRESSOR) 25 MG tablet     multivitamin, therapeutic with minerals (THERA-VIT-M) TABS tablet     pantoprazole (PROTONIX) 40 MG EC tablet     pentoxifylline (TRENTAL) 400 MG CR tablet     predniSONE (DELTASONE) 2.5 MG tablet     predniSONE (DELTASONE) 5 MG tablet     sulfamethoxazole-trimethoprim (BACTRIM DS/SEPTRA DS) 800-160 MG per tablet     tacrolimus (GENERIC EQUIVALENT) 1 MG capsule     valGANciclovir (VALCYTE) 450 MG tablet     ondansetron (ZOFRAN) 4 MG tablet     order for DME     order for DME     predniSONE (DELTASONE) 20 MG tablet     No current facility-administered medications for this visit.        No Known Allergies      Past Medical History:   Diagnosis Date     Antisynthetase syndrome (H) 2014     Chronic cough      Chronic infection     recent C diff  8/18     Dehydration 8/1/2018     Dermatomyositis (H)      Dysplasia of cervix, low grade (ESTRADA 1)      ILD (interstitial lung disease) (H)      Osteopenia      PONV (postoperative nausea and vomiting)      Pulmonary hypertension (H)      Raynaud's disease      Seronegative rheumatoid arthritis (H)        Past Surgical History:   Procedure Laterality Date     BRONCHOSCOPY (RIGID OR FLEXIBLE), DIAGNOSTIC N/A 4/10/2018    Procedure: COMBINED BRONCHOSCOPY (RIGID OR FLEXIBLE), LAVAGE;;  Surgeon: Mariposa Donohue MD;  Location: [facility]     BRONCHOSCOPY (RIGID OR FLEXIBLE), DILATE BRONCHUS / TRACHEA N/A 10/11/2018    Procedure:  BRONCHOSCOPY (RIGID OR FLEXIBLE), DILATE BRONCHUS / TRACHEA;  Flexible And Rigid Bronchoscopy and Dilation;  Surgeon: Wilber Lin MD;  Location: [facility]     BRONCHOSCOPY FLEXIBLE N/A 3/13/2018    Procedure: BRONCHOSCOPY FLEXIBLE;  Flexible Bronchoscopy ;  Surgeon: Gissell Sanchez MD;  Location: [facility]     BRONCHOSCOPY FLEXIBLE N/A 5/9/2018    Procedure: BRONCHOSCOPY FLEXIBLE;;  Surgeon: Wilber Lin MD;  Location: [facility]     BRONCHOSCOPY FLEXIBLE AND RIGID N/A 9/10/2018    Procedure: BRONCHOSCOPY FLEXIBLE AND RIGID;  Flexible and Rigid Bronchoscopy with Balloon Dilation, tissue debulking with cryo, and Right mainstem bronchus stent placement;  Surgeon: Wilber Lin MD;  Location: [facility]     BRONCHOSCOPY RIGID N/A 6/6/2018    Procedure: BRONCHOSCOPY RIGID;;  Surgeon: Lopez Macias MD;  Location: [facility]     BRONCHOSCOPY, DILATE BRONCHUS, STENT BRONCHUS, COMBINED N/A 6/11/2018    Procedure: COMBINED BRONCHOSCOPY, DILATE BRONCHUS, STENT BRONCHUS;  Flexible Bronchoscopy, Balloon Dilation, Bronchial Washings;  Surgeon: Wilber Lin MD;  Location: [facility]     BRONCHOSCOPY, DILATE BRONCHUS, STENT BRONCHUS, COMBINED Right 7/10/2018    Procedure: COMBINED BRONCHOSCOPY, DILATE BRONCHUS, STENT BRONCHUS;  Flexible Bronchoscopy, Balloon Dilation, Bronchial Washings  ;  Surgeon: Wilber Lin MD;  [facility]: [facility]     BRONCHOSCOPY, DILATE BRONCHUS, STENT BRONCHUS, COMBINED N/A 8/2/2018    Procedure: COMBINED BRONCHOSCOPY, DILATE BRONCHUS, STENT BRONCHUS;  Flexible Bronchoscopy, Bronchial Washings, Balloon Dilation;  Surgeon: Wilber Lin MD;  [facility]: [facility]     BRONCHOSCOPY, DILATE BRONCHUS, STENT BRONCHUS, COMBINED N/A 8/20/2018    Procedure: COMBINED BRONCHOSCOPY, DILATE BRONCHUS, STENT BRONCHUS;  Flexible Bronchoscopy, Balloon Dilation;  Surgeon: Wilber Lin MD;  Location: [facility]     BRONCHOSCOPY, DILATE BRONCHUS, STENT BRONCHUS, COMBINED N/A  10/29/2018    Procedure: Flexible Bronchoscopy, Balloon Dilation, Stent Revision, Airway Examination And Therapeutic Suctioning, Cyro Tumor Debulking;  Surgeon: Wilber Lin MD;  [facility]     BRONCHOSCOPY, DILATE BRONCHUS, STENT BRONCHUS, COMBINED N/A 11/20/2018    Procedure: Rigid Bronchoscopy, Stent Removal and dilitation;  Surgeon: Wilber Lin MD;  Location:  OR     ENT SURGERY      tonsillectomy as a child     ESOPHAGOSCOPY, GASTROSCOPY, DUODENOSCOPY (EGD), COMBINED N/A 10/29/2018    Procedure: COMBINED ESOPHAGOSCOPY, GASTROSCOPY, DUODENOSCOPY (EGD) with biopsies and polypectomy;  Surgeon: Chente Bloom MD;  Location:  OR     INSERT EXTRACORPORAL MEMBRANE OXYGENATOR ADULT (OUTSIDE OR) N/A 2/27/2018    Procedure: INSERT EXTRACORPORAL MEMBRANE OXYGENATOR ADULT (OUTSIDE OR);  INSERT EXTRACORPORAL MEMBRANE OXYGENATOR ADULT (OUTSIDE OR) ;  Surgeon: Toby Hernandez MD;  Location:  OR     no prior surgery       REMOVE EXTRACORPORAL MEMBRANE OXYGENATOR ADULT N/A 3/3/2018    Procedure: REMOVE EXTRACORPORAL MEMBRANE OXYGENATOR ADULT;  Removal of Right Femoral Venous and Right Axillary Arterial Extracorporeal Membrane Oxygenator;  Surgeon: Toby Hernandez MD;  Location:  OR     TRANSPLANT LUNG RECIPIENT SINGLE X2 Bilateral 3/1/2018    Procedure: TRANSPLANT LUNG RECIPIENT SINGLE X2;  Median Sternotomy, Extracorporeal Membrane Oxygenator, bilateral sequential lung transplant;  Surgeon: Toby Hernandez MD;  Location:  OR       Social History     Social History     Marital status:      Spouse name: N/A     Number of children: N/A     Years of education: N/A     Occupational History     Not on file.     Social History Main Topics     Smoking status: Never Smoker     Smokeless tobacco: Never Used     Alcohol use No     Drug use: No     Sexual activity: Not on file     Other Topics Concern     Parent/Sibling W/ Cabg, Mi Or Angioplasty Before  "65f 55m? No     Social History Narrative       Family History   Problem Relation Age of Onset     Hypertension Mother      Arthritis Mother      Pancreatic Cancer Father      Diabetes Father        Pulmonary ROS  Constitutional- Positive. Has lost 10 pounds in the last month.  Eyes- Negative  Ear, nose and throat- Positive. Rhinorrhea.  Cardiac- Negative  Pulm- See HPI  GI- Positive. Nausea and vomiting since Sunday morning. Loose stools about every two hours since Monday morning.  Genitourinary- Negative  Musculoskeletal- Positive. Bilateral knee pain with right knee edema.  Neurology- Negative  Dermatology- Negative  Endocrine- Negative  Lymphatic- Negative  Psychiatry- Negative  A complete ROS was otherwise negative except as noted in the HPI.      /80 (BP Location: Right arm, Patient Position: Sitting, Cuff Size: Adult Small)  Pulse 87  Temp 98.1  F (36.7  C)  Resp 20  Ht 1.626 m (5' 4\")  Wt 45.7 kg (100 lb 11.2 oz)  LMP 06/07/2014 (Exact Date)  SpO2 100%  BMI 17.29 kg/m2  Physical Exam  GENERAL APPEARANCE: Alert, Oriented x3. Not in distress.  EYES: PERRL, EOMI  HENT: Nasal mucosa with no hyperemia and no edema, no nasal polyps.  MOUTH: Oral mucosa is moist, without any lesions, no tonsillar enlargement, no oropharyngeal exudate.  NECK: Supple, no masses, no thyromegaly.  LYMPHATICS: No significant axillary, cervical, or supraclavicular nodes.  RESP: Normal percussion, good air flow throughout Left lung. Rt. lung insp wheeze heard and poor exhalation.  CV: Normal S1, S2, regular rhythm, normal rate, no rub, no murmur,  no gallop. Trace right lower extremity edema, no left lower extremity edema.  ABDOMEN: Bowel sounds normal, soft, nontender, no HSM or masses.  MS: Extremities normal, no clubbing, no cyanosis.   SKIN: No rash on limited exam.  NEURO: Mentation intact, speech normal, normal strength and tone, normal gait, and stance.  PSYCH: Mentation appears normal, and affect " normal/bright.      Results  Recent Results (from the past 168 hour(s))   Basic metabolic panel    Collection Time: 12/04/18 11:02 AM   Result Value Ref Range    Sodium 132 (L) 133 - 144 mmol/L    Potassium 4.3 3.4 - 5.3 mmol/L    Chloride 100 94 - 109 mmol/L    Carbon Dioxide 18 (L) 20 - 32 mmol/L    Anion Gap 14 3 - 14 mmol/L    Glucose 86 70 - 99 mg/dL    Urea Nitrogen 30 7 - 30 mg/dL    Creatinine 1.13 (H) 0.52 - 1.04 mg/dL    GFR Estimate 50 (L) >60 mL/min/1.7m2    GFR Estimate If Black 60 (L) >60 mL/min/1.7m2    Calcium 9.4 8.5 - 10.1 mg/dL   Magnesium    Collection Time: 12/04/18 11:02 AM   Result Value Ref Range    Magnesium 2.0 1.6 - 2.3 mg/dL   CBC with platelets    Collection Time: 12/04/18 11:02 AM   Result Value Ref Range    WBC 3.9 (L) 4.0 - 11.0 10e9/L    RBC Count 2.42 (L) 3.8 - 5.2 10e12/L    Hemoglobin 7.6 (L) 11.7 - 15.7 g/dL    Hematocrit 25.1 (L) 35.0 - 47.0 %     (H) 78 - 100 fl    MCH 31.4 26.5 - 33.0 pg    MCHC 30.3 (L) 31.5 - 36.5 g/dL    RDW 16.4 (H) 10.0 - 15.0 %    Platelet Count 610 (H) 150 - 450 10e9/L   INR    Collection Time: 12/04/18 11:02 AM   Result Value Ref Range    INR 0.98 0.86 - 1.14   General PFT Lab (Please always keep checked)    Collection Time: 12/04/18 11:21 AM   Result Value Ref Range    FVC-Pred 3.23 L    FVC-Pre 1.84 L    FVC-%Pred-Pre 56 %    FEV1-Pre 0.98 L    FEV1-%Pred-Pre 37 %    FEV1FVC-Pred 79 %    FEV1FVC-Pre 53 %    FEFMax-Pred 6.42 L/sec    FEFMax-Pre 1.94 L/sec    FEFMax-%Pred-Pre 30 %    FEF2575-Pred 2.43 L/sec    FEF2575-Pre 0.58 L/sec    GHR9246-%Pred-Pre 23 %    ExpTime-Pre 7.49 sec    FIFMax-Pre 2.33 L/sec    FEV1FEV6-Pred 81 %    FEV1FEV6-Pre 53 %        Results as noted above.    PFT Interpretation:  Severe obstructive ventilatory defect along with severe restriction.  FEV1 has improved by 440ml.  FEV1 remains below best of 1.27L.  Valid Maneuver    CXR (12/4/2018), personally reviewed by me: The Lung fields are clear. No effusion. Cardiac  silhouette is WNL.      Assessment and Plan: Kecia Blue is a 57-year-old female with a history of dermatomyositis, seronegative rheumatoid arthritis, interstitial lung disease, and pulmonary hypertension who was admitted to the hospital with acute worsening of chronic hypoxic respiratory failure in 02/2018 and progressed to [facility] for right heart failure and subsequently underwent bilateral sequential lung transplant on 03/01/2018. Her post-transplant course was generally uncomplicated; however, she needed pleural effusion tap on the right side later. She has had significant narrowing of right bronchial anastomosis with significant granulation tissue blocking the mainstem.  She has required multiple dilations and stent placement.     # Bilateral Lung Transplant: She severe restriction with low PFT's. Etiology unclear - ?chest wall restriction. Possible diaphram weakness/paralysis. Deconditioning might be playing a role.  Current IS regimen:   - Tacrolimus with a goal of 10-15 nanograms/mL, AZA 150mg QHS and prednisone. Off MMF due to recurrent N/V    DSA positive with DQB7 previously. Last DSA checked was neg (11/19/18).    12/4/2018: FEV1 has improved but not back to baseline. CXR is stable. Besides rhinorrhea, her pulmonary symptoms are stable; however, she has nausea, vomiting, and loose stools, which developed from Sunday morning. We will continue current IS regimen for now.    # Right Main Bronchial Stenosis S/P Dilatation: Granulation tissue at the Rt. mainstem opening - it was removed. Last Dilation with stent placement on 10/29/18  12/4/2018: Cont Saline nebs BID. It was scheduled this week but we should postpone it given pt's illness.   - FEV1 has improved but not back to baseline. Lung exam is good - not suggestive of stenosis. Imaging is stable.    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 On Valcyte 450mg/day. Previous bronchs have had a significantly elevated CMV level.   12/4/2018: Bronch washing/BAL CMV improving - last done on 11/20/18. CMV in plasma is low level based on external lab but was negative from our lab on 11/19/18. For now we will continue Valcyte untill both are negative. Last spike in plasma level on 7/20/18.  H/o C. diff colitis: Finished a 10day course of Oral Vancomycin.  High risk donor: Will need labs in 12 months.    # Provoked Left Upper Extremity Clot and Right IJ Clot (3/7/18): completed 6months of anticoag (Coumadin).    # Dermatomyositis with Raynaud's Phenomenon: Followed by rheumatology.  - Myositis panel is positive.    # Hoarse Voice: She does complain of some hoarseness and is scheduled to see ENT, has vocal cord polyp seen at there last bronch.    # CKD, Stage III: Cr is stable despite significant N/V/D.   - Will follow for now.  # Hypomagnesemia: The patient remains on magnesium replacement. Her magnesium level today is acceptable.    # Anemia: with hgb of 7.6 down from baseline 8-9s. No s/s of bleeding. Received 1 U PRBC during admission, suspect possible occult bleeding from C diff. Hgb is stable at 7.5. Iron panel, folate/B12 WNL.  - Monitor    # Nausea/Vomitting and Early Satiety: Worsened with the Bactrim. CT abd pelvis on 8/20/18 mildly dilated loops of SB.  On PPI BID.  EGD on 10/29/18 - multiple gastric polyps. Normal esophagus. Duodenal bx was consistent with MMF toxicity.  10/9/2018: She continues to have a poor appetite. On Reglan BID, the nausea has not been the primary limiting factor.   - Prescribed Marinol 5 mg twice a day.  12/4/2018: She has been off MMF since the last month for recurrent vomitting. Currently off Reglan and Marinol was stopped. She is out of Zofran. May trial Marinol again to determine if this helps with weight loss.    Given the prolongation of  her current symptoms, it is unlikely she has developed a food borne illness. I suspect she may have viral syndrome, but will continue to monitor closely. Renal function and vitals are stable and, at this time, I am not concerned for any urgent/emergent etiologies. However, I have recommended that she seek urgent/emergent care if she is unable to keep her medications consumed and/or if her symptoms worsen.    -  Will give IVF 2L today and 1L daily till her sx resolve. IV zofran 8mg x1 today. Zofran script refilled.    - Given continuation of weight loss, will continue to have her follow-up with gastroenterology as scheduled next week to address - chronic nausea and recurrent vomiting.  W/u - CMV is pending (less likely etiology), C diff ordered (was recently on augmentin and has h/o C.diff), Viral infection (Enteric panel ordered). Will check LFT's, Amylase and lipase pt is on Imuran though less likely to be the cause.    # HTN: Well controlled on Metoprolol 25mg BID.    # Bilateral Knee Pain: She has bilateral knee pain, which is due to arthritis, per patient. She received a Cortisone injection in left knee recently and this helped her left knee pain and edema. Right knee is painful and edematous today (10/9/2018).  - If this continues to be a problem might benefit from Orthopedic input.    # Health Care Maintenance: She has had influenza vaccination for 2018/2019 season.  12/4/2018: Consider beginning Shingrix series and TDaP Booster at follow-up.      RTC in 3 to 4 weeks with labs including spirometry and xray due to drop in FEV1.      Scribe Disclosure:   I, Jacinto Duong, am serving as a scribe; to document services personally performed by Tarik Christensen MD based on data collection and the provider's statements to me.     Provider Disclosure:  I agree with above History, Review of Systems, Physical exam and Plan. I have reviewed the content of the documentation and have edited it as needed. I have personally  performed the services documented here and the documentation accurately represents those services and the decisions I have made.      Electronically signed by:  Tarik Christensen MD.

## 2018-12-04 NOTE — MR AVS SNAPSHOT
After Visit Summary   12/4/2018    Kecia Blue    MRN: [id number]           Patient Information     Date Of Birth          1960        Visit Information        Provider Department      12/4/2018 1:10 PM Tarik Christensen MD [facility] Solid Organ Transplant        Today's Diagnoses     Intractable vomiting with nausea, unspecified vomiting type    -  1    Diarrhea of presumed infectious origin          Care Instructions    Patient Instructions  1. Have 2 liters of fluid today at 4:00 in the infusion center. They should give Zofran while you are getting fluids.   2. Get stool cultures today. Possible viral infection that is causing the stomach issues.   3. See the GI MD's next week.   4. Your bronchoscopy will be cancelled. This will be rescheduled.   5. Take Marinol to increase appetite.       Next transplant clinic appointment:  2 weeks with CXR, labs and PFTs      AVS printed at time of check out              Follow-ups after your visit        Follow-up notes from your care team     Return in about 3 weeks (around 12/25/2018).      Your next 10 appointments already scheduled     Dec 04, 2018  4:00 PM CST   Infusion 180 with UC SPEC INFUSION, UC 50 ATC   [facility] ([facility])    [address] [place] [address]  [address] [place] [id number] [phone]            Dec 06, 2018   Procedure with Wilber Lin MD   [facility], [place], Same Day Surgery (--)    [address] [facility] [address]   [phone]            Dec 13, 2018  8:40 AM CST   (Arrive by 8:25 AM)   New Patient Visit with Daron Cruz PA-C   [facility] ([facility])    [address] [place] [address]  4th Floor  [facility] [address]   [phone]            Dec 26, 2018  7:00 AM CST   Lab with [facility]   [facility] ([facility])    [address] [place] [address]  [facility] [id number] [phone]            Dec 26, 2018  7:30 AM CST   XR CHEST 2 VIEWS with UCXR1   [facility] Xray ([facility])    [address] [place] [id number] [phone]           How do I prepare for my exam? (Food and drink instructions) No Food and Drink Restrictions.  How do I prepare for my exam? (Other instructions) You do not need to do anything special for this exam.  What should I wear: Wear comfortable clothes.  How long does the exam take: Most scans take less than 5 minutes.  What should I bring: Bring a list of your medicines, including vitamins, minerals and over-the-counter drugs. It is safest to leave personal items at home.  Do I need a :  No  is needed.  What do I need to tell my doctor: Tell your doctor if there s any chance you are pregnant.  What should I do after the exam: No restrictions, You may resume normal activities.  What is this test: An image of a specific body part shown in shades of black and white.  Who should I call with questions: If you have any questions, please call the Imaging Department where you will have your exam. Directions, parking instructions, and other information is available on our website, [email or web address].            Dec 26, 2018  8:00 AM CST   PFT VISIT with  PFL B   [facility] Pulmonary Function Testing ([facility])    [address] [place] [id number] [phone]            Dec 26, 2018  8:40 AM CST   (Arrive by 8:25 AM)   Return Lung Transplant with Srinivas Mcelroy MD   [facility] Solid Organ Transplant ([facility])    [address] [place] [address]  [address] [facility] [id number] [phone]              Future tests that were ordered for you today     Open Future Orders        Priority Expected Expires Ordered    Lipase Add-On 12/4/2018 12/4/2018 12/4/2018    Amylase  "Add-On 12/4/2018 12/4/2018 12/4/2018    Hepatic panel Add-On 12/4/2018 12/4/2018 12/4/2018    Enteric Bacteria and Virus Panel by ARACELI Stool Routine 12/4/2018 12/13/2018 12/4/2018    Clostridium difficile toxin B PCR Routine 12/4/2018 12/13/2018 12/4/2018    Giardia antigen Routine 12/4/2018 12/13/2018 12/4/2018            Who to contact     If you have questions or need follow up information about today's clinic visit or your schedule please contact [facility] directly at [phone].  Normal or non-critical lab and imaging results will be communicated to you by [employer], letter or phone within 4 business days after the clinic has received the results. If you do not hear from us within 7 days, please contact the clinic through [employer] or phone. If you have a critical or abnormal lab result, we will notify you by phone as soon as possible.  Submit refill requests through [employer] or call your pharmacy and they will forward the refill request to us. Please allow 3 business days for your refill to be completed.          Additional Information About Your Visit        [employer] Information     [facility] gives you secure access to your electronic health record. If you see a primary care provider, you can also send messages to your care team and make appointments. If you have questions, please call your primary care clinic.  If you do not have a primary care provider, please call [phone] and they will assist you.        Care EveryWhere ID     This is your Care EveryWhere ID. This could be used by other organizations to access your [facility] medical records  [id number]        Your Vitals Were     Pulse Temperature Respirations Height Last Period Pulse Oximetry    87 98.1  F (36.7  C) 20 1.626 m (5' 4\") 06/07/2014 (Exact Date) 100%    BMI (Body Mass Index)                   17.29 kg/m2            Blood Pressure from Last 3 Encounters:   12/04/18 124/80   11/20/18 120/85   11/19/18 146/80    Weight from " Last 3 Encounters:   12/04/18 45.7 kg (100 lb 11.2 oz)   11/20/18 50 kg (110 lb 3.7 oz)   11/19/18 50.3 kg (110 lb 12.8 oz)                 Today's Medication Changes          These changes are accurate as of 12/4/18  2:05 PM.  If you have any questions, ask your nurse or doctor.               These medicines have changed or have updated prescriptions.        Dose/Directions    * predniSONE 2.5 MG tablet   Commonly known as:  DELTASONE   This may have changed:    - when to take this  - additional instructions   Used for:  Lung replaced by transplant (H)        Dose:  2.5 mg   Take 1 tablet (2.5 mg) by mouth daily 6/22/18             7.5                    7.5 7/20/18             7.5                    5 8/24/18             5                       5 9/21/18             5                       2.5   Quantity:  90 tablet   Refills:  1       * predniSONE 5 MG tablet   Commonly known as:  DELTASONE   This may have changed:  Another medication with the same name was changed. Make sure you understand how and when to take each.   Used for:  S/P lung transplant (H)        Follow taper card   Quantity:  180 tablet   Refills:  3       * predniSONE 20 MG tablet   Commonly known as:  DELTASONE   This may have changed:  Another medication with the same name was changed. Make sure you understand how and when to take each.   Used for:  Lung replaced by transplant (H)        Dose:  20 mg   Take 1 tablet (20 mg) by mouth daily   Quantity:  5 tablet   Refills:  0       * Notice:  This list has 3 medication(s) that are the same as other medications prescribed for you. Read the directions carefully, and ask your doctor or other care provider to review them with you.      Stop taking these medicines if you haven't already. Please contact your care team if you have questions.     amoxicillin-clavulanate 875-125 MG tablet   Commonly known as:  AUGMENTIN   Stopped by:  Tarik Christensen MD                Where to get your medicines       These medications were sent to [place], [place] - [address]  [address], [facility] [address]    Hours:  TRANSPLANT PHONE NUMBER [phone] Phone:  [phone]     ondansetron 4 MG tablet                Primary Care Provider Office Phone # Fax #    Rosy JUAN Vu [phone]       [facility]  [address]  [facility] [address]        Equal Access to Services     ALBAN VALENCIA : Hadii aad ku hadasho Soomaali, waaxda luqadaha, qaybta kaalmada adeegyada, waxay idiin hayaan adeeg kharaofelia lajanice . So [facility] [phone].    ATENCIÓN: Si habla español, tiene a taylor disposición servicios gratuitos de asistencia lingüística. Zachary al [phone].    We comply with applicable federal civil rights laws and [place] laws. We do not discriminate on the basis of race, color, national origin, age, disability, sex, sexual orientation, or gender identity.            Thank you!     Thank you for choosing [facility]  for your care. Our goal is always to provide you with excellent care. Hearing back from our patients is one way we can continue to improve our services. Please take a few minutes to complete the written survey that you may receive in the mail after your visit with us. Thank you!             Your Updated Medication List - Protect others around you: Learn how to safely use, store and throw away your medicines at www.disposemymeds.org.          This list is accurate as of 12/4/18  2:05 PM.  Always use your most recent med list.                   Brand Name Dispense Instructions for use Diagnosis    ACETAMINOPHEN PO      Take 1,000 mg by mouth every 6 hours as needed for pain        albuterol 108 (90 Base) MCG/ACT inhaler    PROAIR HFA/PROVENTIL HFA/VENTOLIN HFA    1 Inhaler    Inhale 2 puffs into the lungs every 6 hours as needed for shortness of breath / dyspnea or wheezing    SOB (shortness of  breath), Wheezing       azaTHIOprine 50 MG tablet    IMURAN    90 tablet    Take 3 tablets (150 mg) by mouth every evening    Lung replaced by transplant (H)       calcium carbonate 600 mg-vitamin D 400 units 600-400 MG-UNIT per tablet    CALTRATE     Take 1 tablet by mouth daily    S/P lung transplant (H), ILD (interstitial lung disease) (H), Lung transplant recipient (H), Encounter for long-term (current) use of high-risk medication, Anemia, unspecified type, Cytomegalovirus (CMV) viremia (H)       guaiFENesin-codeine 100-10 MG/5ML solution    ROBITUSSIN AC    120 mL    Take 5-10 mLs by mouth every 6 hours as needed for cough    Cough       magnesium oxide 400 MG tablet    MAG-OX    90 tablet    Take 1 tablet (400 mg) by mouth daily    Hypomagnesemia       metoprolol tartrate 25 MG tablet    LOPRESSOR    60 tablet    Take 1 tablet (25 mg) by mouth 2 times daily    Paroxysmal atrial fibrillation (H)       multivitamin w/minerals tablet     30 each    Take 1 tablet by mouth daily    Lung transplant recipient (H)       ondansetron 4 MG tablet    ZOFRAN    60 tablet    Take 1 tablet (4 mg) by mouth every 12 hours as needed for nausea    Intractable vomiting with nausea, unspecified vomiting type       order for DME     1 Device    Equipment being ordered: Nebulizer    Status post lung transplantation (H)       order for DME     1 Device    Equipment being ordered: Nebulizer    Lung transplant recipient (H)       pantoprazole 40 MG EC tablet    PROTONIX    60 tablet    Take 1 tablet (40 mg) by mouth 2 times daily    Gastroesophageal reflux disease without esophagitis, ILD (interstitial lung disease) (H), Lung transplant recipient (H)       pentoxifylline  MG CR tablet    TRENtal    60 tablet    Take 1 tablet (400 mg) by mouth 2 times daily    Lung transplant recipient (H)       * predniSONE 2.5 MG tablet    DELTASONE    90 tablet    Take 1 tablet (2.5 mg) by mouth daily 6/22/18             7.5                     7.5 7/20/18             7.5                    5 8/24/18             5                       5 9/21/18             5                       2.5    Lung replaced by transplant (H)       * predniSONE 5 MG tablet    DELTASONE    180 tablet    Follow taper card    S/P lung transplant (H)       * predniSONE 20 MG tablet    DELTASONE    5 tablet    Take 1 tablet (20 mg) by mouth daily    Lung replaced by transplant (H)       sulfamethoxazole-trimethoprim 800-160 MG tablet    BACTRIM DS/SEPTRA DS    30 tablet    Take 1 tablet by mouth daily X one month.    Actinomyces infection, Lung replaced by transplant (H)       tacrolimus 1 MG capsule    GENERIC EQUIVALENT    240 capsule    Take 4 capsules (4 mg) by mouth 2 times daily    S/P lung transplant (H), Other constipation       VALCYTE 450 MG tablet   Generic drug:  valGANciclovir     30 tablet    Take 450 mg by mouth daily    Cytomegalovirus (CMV) viremia (H), ILD (interstitial lung disease) (H), Lung transplant recipient (H), Encounter for long-term (current) use of high-risk medication, Anemia, unspecified type, S/P lung transplant (H)       * Notice:  This list has 3 medication(s) that are the same as other medications prescribed for you. Read the directions carefully, and ask your doctor or other care provider to review them with you.

## 2018-12-04 NOTE — MR AVS SNAPSHOT
After Visit Summary   12/4/2018    Kecia Blue    MRN: [id number]           Patient Information     Date Of Birth          1960        Visit Information        Provider Department      12/4/2018 4:00 PM UC 50 ATC; UC SPEC INFUSION [facility]        Today's Diagnoses     Nausea and vomiting    -  1    Dehydration        Acute kidney injury (H)           Follow-ups after your visit        Your next 10 appointments already scheduled     Dec 13, 2018  8:40 AM CST   (Arrive by 8:25 AM)   New Patient Visit with Daron Cruz PA-C   [facility] ([facility])    [address] [place] [address]  4th [facility] [address]   [phone]            Dec 14, 2018   Procedure with Wilber Lin MD   [facility] [place], Same Day Surgery (--)    [address]  [place] [address]   [phone]            Dec 26, 2018  7:00 AM CST   Lab with [facility]   [facility] ([facility])    [address] [place] [id number] [phone]            Dec 26, 2018  7:30 AM CST   XR CHEST 2 VIEWS with UCXR1   [facility] ([facility])    [address] [place] [id number] [phone]           How do I prepare for my exam? (Food and drink instructions) No Food and Drink Restrictions.  How do I prepare for my exam? (Other instructions) You do not need to do anything special for this exam.  What should I wear: Wear comfortable clothes.  How long does the exam take: Most scans take less than 5 minutes.  What should I bring: Bring a list of your medicines, including vitamins, minerals and over-the-counter drugs. It is safest to leave personal items at home.  Do I need a :  No  is needed.  What do I need to tell my doctor: Tell your doctor if there s any chance you are pregnant.  What should  I do after the exam: No restrictions, You may resume normal activities.  What is this test: An image of a specific body part shown in shades of black and white.  Who should I call with questions: If you have any questions, please call the Imaging Department where you will have your exam. Directions, parking instructions, and other information is available on our website, [email or web address].            Dec 26, 2018  8:00 AM CST   PFT VISIT with  PFL B   [facility] Pulmonary Function Testing ([facility])    [address] [place] [address] [phone]            Dec 26, 2018  8:40 AM CST   (Arrive by 8:25 AM)   Return Lung Transplant with Srinivas Mcelroy MD   [facility] Solid Organ Transplant ([facility])    [address] [place] [address] [phone]              Future tests that were ordered for you today     Open Future Orders        Priority Expected Expires Ordered    Lipase Add-On 12/4/2018 12/4/2018 12/4/2018    Amylase Add-On 12/4/2018 12/4/2018 12/4/2018    Hepatic panel Add-On 12/4/2018 12/4/2018 12/4/2018            Who to contact     If you have questions or need follow up information about today's clinic visit or your schedule please contact [facility] PROCEDURE directly at [phone].  Normal or non-critical lab and imaging results will be communicated to you by MyChart, letter or phone within 4 business days after the clinic has received the results. If you do not hear from us within 7 days, please contact the clinic through MyChart or phone. If you have a critical or abnormal lab result, we will notify you by phone as soon as possible.  Submit refill requests through [employer] or call your pharmacy and they will forward the refill request to us. Please allow 3 business days for your refill to be completed.          Additional Information About Your  Visit        [email or web address] Information     [email or web address] gives you secure access to your electronic health record. If you see a primary care provider, you can also send messages to your care team and make appointments. If you have questions, please call your primary care clinic.  If you do not have a primary care provider, please call [phone] and they will assist you.        Care EveryWhere ID     This is your Care EveryWhere ID. This could be used by other organizations to access your [place] medical records  [id number]        Your Vitals Were     Pulse Temperature Last Period             79 98.4  F (36.9  C) (Oral) 06/07/2014 (Exact Date)          Blood Pressure from Last 3 Encounters:   12/04/18 152/84   12/04/18 124/80   11/20/18 120/85    Weight from Last 3 Encounters:   12/04/18 45.7 kg (100 lb 11.2 oz)   11/20/18 50 kg (110 lb 3.7 oz)   11/19/18 50.3 kg (110 lb 12.8 oz)              Today, you had the following     No orders found for display         Today's Medication Changes          These changes are accurate as of 12/4/18  6:34 PM.  If you have any questions, ask your nurse or doctor.               These medicines have changed or have updated prescriptions.        Dose/Directions    * predniSONE 2.5 MG tablet   Commonly known as:  DELTASONE   This may have changed:    - when to take this  - additional instructions   Used for:  Lung replaced by transplant (H)        Dose:  2.5 mg   Take 1 tablet (2.5 mg) by mouth daily 6/22/18             7.5                    7.5 7/20/18             7.5                    5 8/24/18             5                       5 9/21/18             5                       2.5   Quantity:  90 tablet   Refills:  1       * predniSONE 5 MG tablet   Commonly known as:  DELTASONE   This may have changed:  Another medication with the same name was changed. Make sure you understand how and when to take each.   Used for:  S/P lung transplant (H)        Follow taper card   Quantity:  180 tablet    Refills:  3       * predniSONE 20 MG tablet   Commonly known as:  DELTASONE   This may have changed:  Another medication with the same name was changed. Make sure you understand how and when to take each.   Used for:  Lung replaced by transplant (H)        Dose:  20 mg   Take 1 tablet (20 mg) by mouth daily   Quantity:  5 tablet   Refills:  0       * Notice:  This list has 3 medication(s) that are the same as other medications prescribed for you. Read the directions carefully, and ask your doctor or other care provider to review them with you.      Stop taking these medicines if you haven't already. Please contact your care team if you have questions.     amoxicillin-clavulanate 875-125 MG tablet   Commonly known as:  AUGMENTIN   Stopped by:  Tarik Christensen MD                Where to get your medicines      These medications were sent to [facility] [address] [facility] [address]  [address] [facility] [address] [place] [address]    Hours:  TRANSPLANT PHONE NUMBER [phone] Phone:  [phone]     ondansetron 4 MG tablet                Primary Care Provider Office Phone # Fax #    Rosy Vu [phone]       [facility]  [address]  [facility] [address]        Equal Access to Services     ALBAN VALENCIA AH: Hadii aad ku hadasho Soomaali, waaxda luqadaha, qaybta kaalmada adeegyada, waxay yoliin haymckayla kelley. So [facility] [phone].    ATENCIÓN: Si habla español, tiene a taylor disposición servicios gratuitos de asistencia lingüística. Llame al [phone].    We comply with applicable federal civil rights laws and [place] laws. We do not discriminate on the basis of race, color, national origin, age, disability, sex, sexual orientation, or gender identity.            Thank you!     Thank you for choosing [facility] PROCED[facility]  for your care. Our goal is always to provide you with  excellent care. Hearing back from our patients is one way we can continue to improve our services. Please take a few minutes to complete the written survey that you may receive in the mail after your visit with us. Thank you!             Your Updated Medication List - Protect others around you: Learn how to safely use, store and throw away your medicines at www.disposemymeds.org.          This list is accurate as of 12/4/18  6:34 PM.  Always use your most recent med list.                   Brand Name Dispense Instructions for use Diagnosis    ACETAMINOPHEN PO      Take 1,000 mg by mouth every 6 hours as needed for pain        albuterol 108 (90 Base) MCG/ACT inhaler    PROAIR HFA/PROVENTIL HFA/VENTOLIN HFA    1 Inhaler    Inhale 2 puffs into the lungs every 6 hours as needed for shortness of breath / dyspnea or wheezing    SOB (shortness of breath), Wheezing       azaTHIOprine 50 MG tablet    IMURAN    90 tablet    Take 3 tablets (150 mg) by mouth every evening    Lung replaced by transplant (H)       calcium carbonate 600 mg-vitamin D 400 units 600-400 MG-UNIT per tablet    CALTRATE     Take 1 tablet by mouth daily    S/P lung transplant (H), ILD (interstitial lung disease) (H), Lung transplant recipient (H), Encounter for long-term (current) use of high-risk medication, Anemia, unspecified type, Cytomegalovirus (CMV) viremia (H)       guaiFENesin-codeine 100-10 MG/5ML solution    ROBITUSSIN AC    120 mL    Take 5-10 mLs by mouth every 6 hours as needed for cough    Cough       magnesium oxide 400 MG tablet    MAG-OX    90 tablet    Take 1 tablet (400 mg) by mouth daily    Hypomagnesemia       metoprolol tartrate 25 MG tablet    LOPRESSOR    60 tablet    Take 1 tablet (25 mg) by mouth 2 times daily    Paroxysmal atrial fibrillation (H)       multivitamin w/minerals tablet     30 each    Take 1 tablet by mouth daily    Lung transplant recipient (H)       ondansetron 4 MG tablet    ZOFRAN    60 tablet    Take 1 tablet  (4 mg) by mouth every 12 hours as needed for nausea    Intractable vomiting with nausea, unspecified vomiting type       order for DME     1 Device    Equipment being ordered: Nebulizer    Status post lung transplantation (H)       order for DME     1 Device    Equipment being ordered: Nebulizer    Lung transplant recipient (H)       pantoprazole 40 MG EC tablet    PROTONIX    60 tablet    Take 1 tablet (40 mg) by mouth 2 times daily    Gastroesophageal reflux disease without esophagitis, ILD (interstitial lung disease) (H), Lung transplant recipient (H)       pentoxifylline  MG CR tablet    TRENtal    60 tablet    Take 1 tablet (400 mg) by mouth 2 times daily    Lung transplant recipient (H)       * predniSONE 2.5 MG tablet    DELTASONE    90 tablet    Take 1 tablet (2.5 mg) by mouth daily 6/22/18             7.5                    7.5 7/20/18             7.5                    5 8/24/18             5                       5 9/21/18             5                       2.5    Lung replaced by transplant (H)       * predniSONE 5 MG tablet    DELTASONE    180 tablet    Follow taper card    S/P lung transplant (H)       * predniSONE 20 MG tablet    DELTASONE    5 tablet    Take 1 tablet (20 mg) by mouth daily    Lung replaced by transplant (H)       sulfamethoxazole-trimethoprim 800-160 MG tablet    BACTRIM DS/SEPTRA DS    30 tablet    Take 1 tablet by mouth daily X one month.    Actinomyces infection, Lung replaced by transplant (H)       tacrolimus 1 MG capsule    GENERIC EQUIVALENT    240 capsule    Take 4 capsules (4 mg) by mouth 2 times daily    S/P lung transplant (H), Other constipation       VALCYTE 450 MG tablet   Generic drug:  valGANciclovir     30 tablet    Take 450 mg by mouth daily    Cytomegalovirus (CMV) viremia (H), ILD (interstitial lung disease) (H), Lung transplant recipient (H), Encounter for long-term (current) use of high-risk medication, Anemia, unspecified type, S/P lung transplant (H)        * Notice:  This list has 3 medication(s) that are the same as other medications prescribed for you. Read the directions carefully, and ask your doctor or other care provider to review them with you.

## 2018-12-05 ENCOUNTER — TELEPHONE (OUTPATIENT)
Dept: TRANSPLANT | Facility: CLINIC | Age: 58
End: 2018-12-05

## 2018-12-05 DIAGNOSIS — Z94.2 S/P LUNG TRANSPLANT (H): ICD-10-CM

## 2018-12-05 DIAGNOSIS — K59.09 OTHER CONSTIPATION: ICD-10-CM

## 2018-12-05 LAB
C COLI+JEJUNI+LARI FUSA STL QL NAA+PROBE: NOT DETECTED
CMV DNA SPEC NAA+PROBE-ACNC: <137 [IU]/ML
CMV DNA SPEC NAA+PROBE-LOG#: <2.1 {LOG_IU}/ML
EC STX1 GENE STL QL NAA+PROBE: NOT DETECTED
EC STX2 GENE STL QL NAA+PROBE: NOT DETECTED
ENTERIC PATHOGEN COMMENT: NORMAL
G LAMBLIA AG STL QL IA: NORMAL
NOROV GI+II ORF1-ORF2 JNC STL QL NAA+PR: NOT DETECTED
RVA NSP5 STL QL NAA+PROBE: NOT DETECTED
SALMONELLA SP RPOD STL QL NAA+PROBE: NOT DETECTED
SHIGELLA SP+EIEC IPAH STL QL NAA+PROBE: NOT DETECTED
SPECIMEN SOURCE: ABNORMAL
SPECIMEN SOURCE: NORMAL
V CHOL+PARA RFBL+TRKH+TNAA STL QL NAA+PR: NOT DETECTED
Y ENTERO RECN STL QL NAA+PROBE: NOT DETECTED

## 2018-12-05 RX ORDER — TACROLIMUS 1 MG/1
CAPSULE ORAL
Qty: 240 CAPSULE | Refills: 11 | Status: SHIPPED | OUTPATIENT
Start: 2018-12-05 | End: 2018-12-14

## 2018-12-05 NOTE — TELEPHONE ENCOUNTER
"Patient reports feeling a lot better since getting fluids yesterday, saying the \"zofran and fluids worked miracles.\" Stool tests from 12/4/18 were all negative.     Tacrolimus level 5.5 at 12 hours, on 12/4/18  Goal 9-11.   Current dose 4 mg in AM, 4 mg in PM    Dose changed to 5 mg in AM, 4 mg in PM   Recheck level in 7 days    Discussed with patient.  "

## 2018-12-06 ENCOUNTER — TELEPHONE (OUTPATIENT)
Dept: GASTROENTEROLOGY | Facility: CLINIC | Age: 58
End: 2018-12-06

## 2018-12-06 ENCOUNTER — TELEPHONE (OUTPATIENT)
Dept: PHARMACY | Facility: CLINIC | Age: 58
End: 2018-12-06

## 2018-12-06 LAB
DONOR IDENTIFICATION: NORMAL
DSA COMMENTS: NORMAL
DSA PRESENT: NO
DSA TEST METHOD: NORMAL
ORGAN: NORMAL
SA1 CELL: NORMAL
SA1 COMMENTS: NORMAL
SA1 HI RISK ABY: NORMAL
SA1 MOD RISK ABY: NORMAL
SA1 TEST METHOD: NORMAL
SA2 CELL: NORMAL
SA2 COMMENTS: NORMAL
SA2 HI RISK ABY UA: NORMAL
SA2 MOD RISK ABY: NORMAL
SA2 TEST METHOD: NORMAL
UNACCEPTABLE ANTIGEN: NORMAL
UNOS CPRA: 0

## 2018-12-06 NOTE — TELEPHONE ENCOUNTER
[facility] Prior Authorization Team Request    Medication: Ondanesetron 4mg  Dosing: Take one tablet by mouth every 12 hours as needed for nausea  NDC (required for Medicaid members):     Insurance   BIN:[id number]  PCN:[id number]  Grp:[id number]  ID:[id number]    CoverMyMeds Key (if applicable):     Additional documentation:     Filling Pharmacy:[facility]  Phone Number:[phone]  Contact: HOLLI PHARM AWILDA PA ([id number]) please send all responses to this pool.  Pharmacy NPI (required for Medicaid members):

## 2018-12-07 NOTE — TELEPHONE ENCOUNTER
Central Prior Authorization Team   Phone: [phone]    PA Initiation    Medication: ondansetron (ZOFRAN) 4 MG tablet  Insurance Company: [employer] - Phone [phone] Fax [phone]  Pharmacy Filling the Rx: [facility] [place], [place] - [address] [place] [address] [phone]  Filling Pharmacy Phone: [phone]  Filling Pharmacy Fax: [phone]  Start Date: 12/7/2018

## 2018-12-10 LAB — MAMMOGRAM: NORMAL

## 2018-12-10 NOTE — TELEPHONE ENCOUNTER
Prior Authorization Approval    Authorization Effective Date:    Authorization Expiration Date:    Medication: ondansetron (ZOFRAN) 4 MG tablet - P/A APPROVED  Approved Dose/Quantity: 60 tabs  Reference #: [id number] [id number]   Insurance Company: JANELL - Phone [phone] Fax [phone]  Expected CoPay:    No copay   CoPay Card Available:      Foundation Assistance Needed:    Which Pharmacy is filling the prescription (Not needed for infusion/clinic administered): [facility] [place], [place] - [address] [place] [address] [phone]  Pharmacy Notified: Yes - spoke to Danielle at pharmacy, she was able to reprocess claim for a zero copay (patient had paid $49.95 on 12/04/18), so they will contact patient to issue refund  Patient Notified:

## 2018-12-11 ENCOUNTER — TELEPHONE (OUTPATIENT)
Dept: TRANSPLANT | Facility: CLINIC | Age: 58
End: 2018-12-11

## 2018-12-11 NOTE — TELEPHONE ENCOUNTER
Provider Call: General  Route to LPN     Reason for call: patient's infusion order dated 12/10/18 was mailed to [facility] which they are not a patient of there.    Call back needed? Yes    Return Call Needed  Same as documented in contacts section  When to return call?: Same day: Route High Priority

## 2018-12-12 NOTE — TELEPHONE ENCOUNTER
FUTURE VISIT INFORMATION      FUTURE VISIT INFORMATION:    Date: 12/13/2018    Time:     Location: [facility]    REFERRAL INFORMATION:    Referring provider: Ame Chow PA-C     Referring providers clinic:   [facility]    Reason for visit/diagnosis  Gastroesophageal reflux disease without esophagitis

## 2018-12-12 NOTE — TELEPHONE ENCOUNTER
Called pt to check in about infusion. Pt states that there are no issues. Pt requested a refill on her Marinol. Script sent to the [facility] pharmacy.

## 2018-12-13 ENCOUNTER — TELEPHONE (OUTPATIENT)
Dept: TRANSPLANT | Facility: CLINIC | Age: 58
End: 2018-12-13

## 2018-12-13 ENCOUNTER — PRE VISIT (OUTPATIENT)
Dept: GASTROENTEROLOGY | Facility: CLINIC | Age: 58
End: 2018-12-13

## 2018-12-13 ENCOUNTER — TELEPHONE (OUTPATIENT)
Dept: GASTROENTEROLOGY | Facility: CLINIC | Age: 58
End: 2018-12-13

## 2018-12-13 ENCOUNTER — OFFICE VISIT (OUTPATIENT)
Dept: ORTHOPEDICS | Facility: CLINIC | Age: 58
End: 2018-12-13
Payer: COMMERCIAL

## 2018-12-13 ENCOUNTER — OFFICE VISIT (OUTPATIENT)
Dept: TRANSPLANT | Facility: CLINIC | Age: 58
End: 2018-12-13
Attending: INTERNAL MEDICINE
Payer: COMMERCIAL

## 2018-12-13 ENCOUNTER — INFUSION THERAPY VISIT (OUTPATIENT)
Dept: INFUSION THERAPY | Facility: CLINIC | Age: 58
End: 2018-12-13
Attending: INTERNAL MEDICINE
Payer: COMMERCIAL

## 2018-12-13 ENCOUNTER — OFFICE VISIT (OUTPATIENT)
Dept: GASTROENTEROLOGY | Facility: CLINIC | Age: 58
End: 2018-12-13
Payer: COMMERCIAL

## 2018-12-13 VITALS
SYSTOLIC BLOOD PRESSURE: 135 MMHG | DIASTOLIC BLOOD PRESSURE: 83 MMHG | RESPIRATION RATE: 16 BRPM | OXYGEN SATURATION: 99 % | TEMPERATURE: 98.5 F | HEART RATE: 85 BPM

## 2018-12-13 VITALS — HEART RATE: 85 BPM | SYSTOLIC BLOOD PRESSURE: 135 MMHG | RESPIRATION RATE: 16 BRPM | DIASTOLIC BLOOD PRESSURE: 83 MMHG

## 2018-12-13 VITALS
HEART RATE: 86 BPM | OXYGEN SATURATION: 100 % | TEMPERATURE: 98.2 F | DIASTOLIC BLOOD PRESSURE: 70 MMHG | SYSTOLIC BLOOD PRESSURE: 112 MMHG

## 2018-12-13 VITALS
HEART RATE: 85 BPM | SYSTOLIC BLOOD PRESSURE: 110 MMHG | BODY MASS INDEX: 19.04 KG/M2 | WEIGHT: 111.5 LBS | TEMPERATURE: 98.5 F | HEIGHT: 64 IN | DIASTOLIC BLOOD PRESSURE: 70 MMHG | OXYGEN SATURATION: 95 %

## 2018-12-13 DIAGNOSIS — M54.16 LUMBAR RADICULAR PAIN: Primary | ICD-10-CM

## 2018-12-13 DIAGNOSIS — R19.5 LOOSE STOOLS: ICD-10-CM

## 2018-12-13 DIAGNOSIS — Z94.2 S/P LUNG TRANSPLANT (H): ICD-10-CM

## 2018-12-13 DIAGNOSIS — R11.2 NAUSEA AND VOMITING, INTRACTABILITY OF VOMITING NOT SPECIFIED, UNSPECIFIED VOMITING TYPE: Primary | ICD-10-CM

## 2018-12-13 DIAGNOSIS — D64.9 ANEMIA, UNSPECIFIED TYPE: ICD-10-CM

## 2018-12-13 DIAGNOSIS — J84.9 ILD (INTERSTITIAL LUNG DISEASE) (H): ICD-10-CM

## 2018-12-13 DIAGNOSIS — M25.462 BILATERAL KNEE EFFUSIONS: ICD-10-CM

## 2018-12-13 DIAGNOSIS — D64.9 LOW HEMOGLOBIN: Primary | ICD-10-CM

## 2018-12-13 DIAGNOSIS — M13.861 ALLERGIC ARTHRITIS OF RIGHT KNEE: ICD-10-CM

## 2018-12-13 DIAGNOSIS — Z94.2 LUNG TRANSPLANT RECIPIENT (H): ICD-10-CM

## 2018-12-13 DIAGNOSIS — M25.461 BILATERAL KNEE EFFUSIONS: ICD-10-CM

## 2018-12-13 DIAGNOSIS — D64.9 LOW HEMOGLOBIN: ICD-10-CM

## 2018-12-13 DIAGNOSIS — B25.9 CYTOMEGALOVIRUS (CMV) VIREMIA (H): ICD-10-CM

## 2018-12-13 DIAGNOSIS — Z79.899 ENCOUNTER FOR LONG-TERM (CURRENT) USE OF HIGH-RISK MEDICATION: ICD-10-CM

## 2018-12-13 LAB
ABO + RH BLD: NORMAL
ABO + RH BLD: NORMAL
ANION GAP SERPL CALCULATED.3IONS-SCNC: 8 MMOL/L (ref 3–14)
ANISOCYTOSIS BLD QL SMEAR: ABNORMAL
BASOPHILS # BLD AUTO: 0 10E9/L (ref 0–0.2)
BASOPHILS NFR BLD AUTO: 1.8 %
BLD GP AB SCN SERPL QL: NORMAL
BLD PROD TYP BPU: NORMAL
BLD PROD TYP BPU: NORMAL
BLD UNIT ID BPU: 0
BLOOD BANK CMNT PATIENT-IMP: NORMAL
BLOOD PRODUCT CODE: NORMAL
BPU ID: NORMAL
BUN SERPL-MCNC: 28 MG/DL (ref 7–30)
CALCIUM SERPL-MCNC: 8.6 MG/DL (ref 8.5–10.1)
CHLORIDE SERPL-SCNC: 101 MMOL/L (ref 94–109)
CMV DNA SPEC NAA+PROBE-ACNC: <137 [IU]/ML
CMV DNA SPEC NAA+PROBE-LOG#: <2.1 {LOG_IU}/ML
CO2 SERPL-SCNC: 26 MMOL/L (ref 20–32)
CREAT SERPL-MCNC: 1.66 MG/DL (ref 0.52–1.04)
DACRYOCYTES BLD QL SMEAR: SLIGHT
DIFFERENTIAL METHOD BLD: ABNORMAL
EOSINOPHIL # BLD AUTO: 0 10E9/L (ref 0–0.7)
EOSINOPHIL NFR BLD AUTO: 1.8 %
ERYTHROCYTE [DISTWIDTH] IN BLOOD BY AUTOMATED COUNT: 18.9 % (ref 10–15)
ERYTHROCYTE [DISTWIDTH] IN BLOOD BY AUTOMATED COUNT: 19 % (ref 10–15)
FERRITIN SERPL-MCNC: 302 NG/ML (ref 8–252)
GFR SERPL CREATININE-BSD FRML MDRD: 32 ML/MIN/1.7M2
GLUCOSE SERPL-MCNC: 96 MG/DL (ref 70–99)
HAPTOGLOB SERPL-MCNC: 296 MG/DL (ref 15–200)
HCT VFR BLD AUTO: 22.2 % (ref 35–47)
HCT VFR BLD AUTO: 22.2 % (ref 35–47)
HGB BLD-MCNC: 6.5 G/DL (ref 11.7–15.7)
HGB BLD-MCNC: 6.6 G/DL (ref 11.7–15.7)
HYPOCHROMIA BLD QL: PRESENT
INR PPP: 1.02 (ref 0.86–1.14)
IRON SATN MFR SERPL: 7 % (ref 15–46)
IRON SERPL-MCNC: 16 UG/DL (ref 35–180)
LDH SERPL L TO P-CCNC: 197 U/L (ref 81–234)
LYMPHOCYTES # BLD AUTO: 0.2 10E9/L (ref 0.8–5.3)
LYMPHOCYTES NFR BLD AUTO: 15.3 %
MACROCYTES BLD QL SMEAR: PRESENT
MAGNESIUM SERPL-MCNC: 1.6 MG/DL (ref 1.6–2.3)
MCH RBC QN AUTO: 31.3 PG (ref 26.5–33)
MCH RBC QN AUTO: 31.9 PG (ref 26.5–33)
MCHC RBC AUTO-ENTMCNC: 29.3 G/DL (ref 31.5–36.5)
MCHC RBC AUTO-ENTMCNC: 29.7 G/DL (ref 31.5–36.5)
MCV RBC AUTO: 107 FL (ref 78–100)
MCV RBC AUTO: 107 FL (ref 78–100)
MONOCYTES # BLD AUTO: 0.4 10E9/L (ref 0–1.3)
MONOCYTES NFR BLD AUTO: 27 %
NEUTROPHILS # BLD AUTO: 0.9 10E9/L (ref 1.6–8.3)
NEUTROPHILS NFR BLD AUTO: 54.1 %
NUM BPU REQUESTED: 1
OVALOCYTES BLD QL SMEAR: SLIGHT
PLATELET # BLD AUTO: 674 10E9/L (ref 150–450)
PLATELET # BLD AUTO: 773 10E9/L (ref 150–450)
PLATELET # BLD EST: ABNORMAL 10*3/UL
POIKILOCYTOSIS BLD QL SMEAR: ABNORMAL
POTASSIUM SERPL-SCNC: 5.3 MMOL/L (ref 3.4–5.3)
RBC # BLD AUTO: 2.07 10E12/L (ref 3.8–5.2)
RBC # BLD AUTO: 2.08 10E12/L (ref 3.8–5.2)
RETICS # AUTO: 94.2 10E9/L (ref 25–95)
RETICS/RBC NFR AUTO: 4.6 % (ref 0.5–2)
SODIUM SERPL-SCNC: 134 MMOL/L (ref 133–144)
SPECIMEN EXP DATE BLD: NORMAL
SPECIMEN SOURCE: ABNORMAL
TACROLIMUS BLD-MCNC: 13 UG/L (ref 5–15)
TIBC SERPL-MCNC: 221 UG/DL (ref 240–430)
TME LAST DOSE: NORMAL H
TRANSFUSION STATUS PATIENT QL: NORMAL
TRANSFUSION STATUS PATIENT QL: NORMAL
WBC # BLD AUTO: 1.6 10E9/L (ref 4–11)
WBC # BLD AUTO: 1.8 10E9/L (ref 4–11)

## 2018-12-13 PROCEDURE — 83615 LACTATE (LD) (LDH) ENZYME: CPT

## 2018-12-13 PROCEDURE — 82728 ASSAY OF FERRITIN: CPT

## 2018-12-13 PROCEDURE — 83010 ASSAY OF HAPTOGLOBIN QUANT: CPT

## 2018-12-13 PROCEDURE — 90471 IMMUNIZATION ADMIN: CPT | Mod: ZF

## 2018-12-13 PROCEDURE — 85045 AUTOMATED RETICULOCYTE COUNT: CPT

## 2018-12-13 PROCEDURE — G0463 HOSPITAL OUTPT CLINIC VISIT: HCPCS | Mod: 25,ZF

## 2018-12-13 PROCEDURE — 86850 RBC ANTIBODY SCREEN: CPT | Performed by: INTERNAL MEDICINE

## 2018-12-13 PROCEDURE — 90715 TDAP VACCINE 7 YRS/> IM: CPT | Mod: ZF | Performed by: INTERNAL MEDICINE

## 2018-12-13 PROCEDURE — 86901 BLOOD TYPING SEROLOGIC RH(D): CPT | Performed by: INTERNAL MEDICINE

## 2018-12-13 PROCEDURE — P9016 RBC LEUKOCYTES REDUCED: HCPCS | Performed by: INTERNAL MEDICINE

## 2018-12-13 PROCEDURE — 85027 COMPLETE CBC AUTOMATED: CPT

## 2018-12-13 PROCEDURE — 80048 BASIC METABOLIC PNL TOTAL CA: CPT

## 2018-12-13 PROCEDURE — 86923 COMPATIBILITY TEST ELECTRIC: CPT | Performed by: INTERNAL MEDICINE

## 2018-12-13 PROCEDURE — 85060 BLOOD SMEAR INTERPRETATION: CPT

## 2018-12-13 PROCEDURE — 83735 ASSAY OF MAGNESIUM: CPT

## 2018-12-13 PROCEDURE — 85610 PROTHROMBIN TIME: CPT

## 2018-12-13 PROCEDURE — 80197 ASSAY OF TACROLIMUS: CPT

## 2018-12-13 PROCEDURE — 36430 TRANSFUSION BLD/BLD COMPNT: CPT

## 2018-12-13 PROCEDURE — 85025 COMPLETE CBC W/AUTO DIFF WBC: CPT

## 2018-12-13 PROCEDURE — 83550 IRON BINDING TEST: CPT

## 2018-12-13 PROCEDURE — 83540 ASSAY OF IRON: CPT

## 2018-12-13 PROCEDURE — 85018 HEMOGLOBIN: CPT | Performed by: INTERNAL MEDICINE

## 2018-12-13 PROCEDURE — 25000128 H RX IP 250 OP 636: Mod: ZF | Performed by: INTERNAL MEDICINE

## 2018-12-13 PROCEDURE — 86900 BLOOD TYPING SEROLOGIC ABO: CPT | Performed by: INTERNAL MEDICINE

## 2018-12-13 RX ORDER — VALGANCICLOVIR 450 MG/1
450 TABLET, FILM COATED ORAL
Qty: 12 TABLET | Refills: 11 | Status: SHIPPED | OUTPATIENT
Start: 2018-12-14 | End: 2019-02-15

## 2018-12-13 RX ORDER — FERROUS SULFATE 325(65) MG
325 TABLET ORAL 2 TIMES DAILY
Qty: 60 TABLET | Refills: 2 | Status: SHIPPED | OUTPATIENT
Start: 2018-12-13 | End: 2019-01-16

## 2018-12-13 RX ORDER — PREDNISONE 5 MG/1
TABLET ORAL
Qty: 180 TABLET | Refills: 3 | COMMUNITY
Start: 2018-12-13 | End: 2019-06-05

## 2018-12-13 RX ORDER — DRONABINOL 5 MG/1
5 CAPSULE ORAL
Qty: 60 CAPSULE | Refills: 0 | Status: ON HOLD | OUTPATIENT
Start: 2018-12-13 | End: 2019-09-12

## 2018-12-13 RX ADMIN — TETANUS TOXOID, REDUCED DIPHTHERIA TOXOID AND ACELLULAR PERTUSSIS VACCINE, ADSORBED 0.5 ML: 5; 2.5; 8; 8; 2.5 SUSPENSION INTRAMUSCULAR at 14:46

## 2018-12-13 ASSESSMENT — ENCOUNTER SYMPTOMS
JOINT SWELLING: 1
SEIZURES: 0
HALLUCINATIONS: 0
FEVER: 0
MYALGIAS: 1
EYE IRRITATION: 0
FATIGUE: 1
DOUBLE VISION: 0
WEAKNESS: 1
DYSPNEA ON EXERTION: 1
EYE REDNESS: 0
LOSS OF CONSCIOUSNESS: 0
BACK PAIN: 0
SPUTUM PRODUCTION: 1
POLYPHAGIA: 0
DISTURBANCES IN COORDINATION: 0
SNORES LOUDLY: 0
NIGHT SWEATS: 0
HEMOPTYSIS: 0
POLYDIPSIA: 0
NUMBNESS: 0
PARALYSIS: 0
MUSCLE CRAMPS: 0
TREMORS: 0
MEMORY LOSS: 0
WHEEZING: 1
NECK PAIN: 0
ARTHRALGIAS: 1
TINGLING: 0
DECREASED APPETITE: 1
MUSCLE WEAKNESS: 1
POSTURAL DYSPNEA: 0
EYE WATERING: 0
COUGH: 1
CHILLS: 0
EYE PAIN: 0
COUGH DISTURBING SLEEP: 1
WEIGHT GAIN: 0
DIZZINESS: 0
WEIGHT LOSS: 1
SPEECH CHANGE: 0
ALTERED TEMPERATURE REGULATION: 1
SHORTNESS OF BREATH: 0
HEADACHES: 0
INCREASED ENERGY: 0

## 2018-12-13 ASSESSMENT — PAIN SCALES - GENERAL
PAINLEVEL: NO PAIN (0)
PAINLEVEL: NO PAIN (0)

## 2018-12-13 ASSESSMENT — MIFFLIN-ST. JEOR: SCORE: 1080.76

## 2018-12-13 NOTE — PROGRESS NOTES
SPORTS & ORTHOPEDIC WALK-IN VISIT 12/13/2018    Primary Care Physician: Dr. Vu    Started in left knee a few months ago. Had a cortisone shot and is fine. Xrays showed some arthritis in both knees. Done in [place].   Now having right knee pain. Comes and goes but really acting up today. Nothing happened to cause it. Not very painful, just irritating. Swelling in both knees.   Surgery tomorrow bronchoscopy     Reason for visit:     What part of your body is injured / painful?  right knee    What caused the injury /pain? Unsure    How long ago did your injury occur or pain begin? several months ago    What are your most bothersome symptoms? Pain, Swelling and Other: locking    How would you characterize your symptom?  aching    What makes your symptoms better? Ice, Heat and Tylenol    What makes your symptoms worse? Other: nothing    Have you been previously seen for this problem? No    Medical History:    Any recent changes to your medical history? No    Any new medication prescribed since last visit? No    Have you had surgery on this body part before? No    Social History:    Occupation:     Handedness: Right    Exercise: not recently     Review of Systems:    Do you have fever, chills, weight loss? No    Do you have any vision problems? Yes, right eye    Do you have any chest pain or edema? No    Do you have any shortness of breath or wheezing?  Yes, lung transplant    Do you have stomach problems? No    Do you have any numbness or focal weakness? No    Do you have diabetes? No    Do you have problems with bleeding or clotting? No    Do you have an rashes or other skin lesions? No

## 2018-12-13 NOTE — LETTER
12/13/2018       RE: Kecia Blue  [address] [place] [address]     Dear Colleague,    Thank you for referring your patient, Kecia Blue, to the [facility] GASTROENTEROLOGY AND IBD CLINIC at [facility]. Please see a copy of my visit note below.        Again, thaGI CLINIC VISIT    CC/REFERRING MD:  Ame Chow  REASON FOR CONSULTATION: Nausea, vomiting, loose stools    ASSESSMENT/PLAN:  58-year-old female with history of dermatomyositis, seronegative rheumatoid arthritis, interstitial lung disease and pulmonary hypertension status post lung transplant March 2018 who presents to the GI clinic for consultation regarding nausea and vomiting and early satiety.    1. Nausea: This seemed to be specific to medication triggers to include high doses of antibiotics and coming off of MMF and moving towards an alternative for immunosuppression medications.  Nausea has since resolved and she has been able to gain her appetite back, allowing her to gain weight.  If this were to return, a repeat upper endoscopy may be indicated.  May use Zofran as needed for now.    2.  Loose stools: This is also largely resolved.  Again likely tied to medications such as her immunosuppression and chronic antibiotics.  Again this is has resolved.  Consideration for fiber supplementation if the symptoms do return.    3. Colorectal cancer screening: No personal or family history of colon cancer.  Patient reports a colonoscopy at the age of 52 that was normal, performed in [facility].  We do not have formal records of this report.  If normal, recommendation would be for colonoscopy in 10 years, 2022 unless symptomatic sooner.    4.  Anemia: Hemoglobin drawn today by patient's transplant team showed hemoglobin of 6.5.  Transplant team to determine indication for transfusion.  If there is concern for a GI source, would recommend an upper and lower endoscopy for further  evaluation.  Given patient is largely asymptomatic at this time with no overt signs of bleeding, will hold off on additional GI investigation for source of bleeding.    RTC PRN    Thank you for this consultation.  It was a pleasure to participate in the care of this patient; please contact us with any further questions.       Daron Cruz PA-C  Division of Gastroenterology, Hepatology and Nutrition  [facility]      HPI  58-year-old female with history of dermatomyositis, seronegative rheumatoid arthritis, interstitial lung disease and pulmonary hypertension status post lung transplant March 2018 who presents to the GI clinic for consultation regarding nausea and vomiting and early satiety.    Patient's GI symptoms have been long-standing after her transplant and waxed and waned in severity.  She had been experiencing nausea and vomiting that have been increased with high doses of Bactrim.  This dose was decreased which helped symptoms.  She also completed an upper endoscopy on 10/29/2018 showing multiple gastric polyps, normal esophagus and duodenal biopsy was consistent with MMF toxicity.  MMF was discontinued.  She had also been on Reglan and Marinol to help with symptoms and has discontinued both at this time.    Beginning of December 2018 she had been off of MMF still with recurrent vomiting.  She had some relief with Zofran but was still experiencing nausea and early satiety.  She also was having loose bowel movements, which prompted stool studies to be performed as she has had a history of C. difficile.  The studies were negative for C. difficile infection.  CMV titers were obtained and felt to be negative.  After a clinic visit, she received IV fluids supplementation with improvement in symptoms.    She presents today largely asymptomatic.  She takes Zofran a couple times a week but this is significantly decreased.  She is not experiencing any nausea, vomiting or loose stools.  She has 1 bowel  movement per day without blood.  Due to an appetite increase, she has been able to gain 11 pounds since the beginning of the month.  She denies any heartburn or dysphasia but takes Protonix 40 mg twice a day.  She continues on 7.5 mg of prednisone daily.    Just prior to my clinic visit today, I was notified of a low hemoglobin, 6.5.  We were in prompt communication with her transplant team to arrange management of her anemia.  She denied any overt signs of bleeding, has not had any blood in her stool, hematemesis or symptoms of anemia.  She denies any lightheadedness or dizziness.  She denies any syncopal episodes.    ROS:    No fevers or chills  No weight loss  No blurry vision, double vision or change in vision  No sore throat  No lymphadenopathy  No headache, paraesthesias, or weakness in a limb  + shortness of breath or wheezing (during exertion)  No chest pain or pressure  No arthralgias or myalgias  No rashes or skin changes  No odynophagia or dysphagia  No BRBPR, hematochezia, melena  No dysuria, frequency or urgency  No hot/cold intolerance or polyria  No anxiety or depression    PROBLEM LIST  Patient Active Problem List    Diagnosis Date Noted     Nausea and vomiting 12/04/2018     Priority: Medium     Dehydration 08/01/2018     Priority: Medium     Acute kidney injury (H) 08/01/2018     Priority: Medium     CMV (cytomegalovirus) (H) 08/01/2018     Priority: Medium     Encounter for long-term (current) use of high-risk medication 04/03/2018     Priority: Medium     Deep vein thrombosis (DVT) (H) [I82.409] 03/30/2018     Priority: Medium     Steroid-induced hyperglycemia 03/11/2018     Priority: Medium     Lung transplant recipient (H) 03/01/2018     Priority: Medium     (Note: This summary should only be edited by a member of the lung transplant team. Thanks!)      Transplant providers, see [facility] [place] for additional detailed information      Transplant Hospitalization Summary:  Bilateral  Lung Transplant        Involved in a trial? (ex. INSPIRE, EXPAND):     Notable Intra-Operative Information:    Notes here if pertinent       DONOR Information:    CDC Increased Risk: Y/N?      PATIENT Information:  Serologies:     Donor Recipient Intervention   CMV status      EBV status      HSV status        Transplant Complications:   PRE-op (Y/N) POST-op (Y/N)    Trach      Vent support      Chest tube  n/a    ECMO   Decannulation date: &&&     Primary Graft Dysfunction Documentation:   POD#1   (0-24 hours) POD#2   (25-48 hours) POD#3   (49-72 hours)   Date of data      Time of data      Intubated? Y/N Y/N Y/N   PaO2      FiO2      P/F Ratio      PGD Grade   (0=mild, 3=severe)      ECMO? Y/N Y/N Y/N   Inhaled NO? Y/N Y/N Y/N   ISHLT PGD Scoring  Grade 0 - PaO2/FiO2 >300 and normal chest radiograph (must be extubated to be Grade 0)  Grade 1 - PaO2/FiO2 >300 and diffuse allograft infiltrates on chest radiograph  Grade 2 - PaO2/FiO2 between 200 and 300  Grade 3 - PaO2/FiO2 <200    Post-Op Data:  Complication Y/N Date Comments   Date of Extubation(s) N/A     Return to OR?      Reintubated?      Date Last Chest Tube Removed N/A     Rejection?      Afib?      Renal failure?      HCAP?      DVT/PE?      Native lung pathology results        Prophylaxis:  1) Bactrim  2) Valcyte    Prednisone Taper Plan:    3/15/18 12 12   3/22/18 12 10   4/26/18 10 10   5/24/18 10 7.5   6/22/18 7.5 7.5   7/20/18 7.5 5   8/24/18 5 5   9/21/18 5 2.5             Discharge:  Discharge to: &&&Home / TCU / ARU  Discharge date: &&&       ILD (interstitial lung disease) (H) 02/27/2018     Priority: Medium     Acute on chronic respiratory failure with hypoxia (H) 02/21/2018     Priority: Medium       PERTINENT PAST MEDICAL HISTORY:  Past Medical History:   Diagnosis Date     Antisynthetase syndrome (H) 2014     Chronic cough      Chronic infection     recent C diff  8/18     Dehydration 8/1/2018     Dermatomyositis (H)      Dysplasia of cervix, low grade (ESTRADA 1)       ILD (interstitial lung disease) (H)      Osteopenia      PONV (postoperative nausea and vomiting)      Pulmonary hypertension (H)      Raynaud's disease      Seronegative rheumatoid arthritis (H)        PREVIOUS SURGERIES:  Past Surgical History:   Procedure Laterality Date     BRONCHOSCOPY (RIGID OR FLEXIBLE), DIAGNOSTIC N/A 4/10/2018    Procedure: COMBINED BRONCHOSCOPY (RIGID OR FLEXIBLE), LAVAGE;;  Surgeon: Mariposa Donohue MD;  Location:  GI     BRONCHOSCOPY (RIGID OR FLEXIBLE), DILATE BRONCHUS / TRACHEA N/A 10/11/2018    Procedure: BRONCHOSCOPY (RIGID OR FLEXIBLE), DILATE BRONCHUS / TRACHEA;  Flexible And Rigid Bronchoscopy and Dilation;  Surgeon: Wilber Lin MD;  [facility]     BRONCHOSCOPY FLEXIBLE N/A 3/13/2018    Procedure: BRONCHOSCOPY FLEXIBLE;  Flexible Bronchoscopy ;  Surgeon: Gissell Sanchez MD;  [facility] GI     BRONCHOSCOPY FLEXIBLE N/A 5/9/2018    Procedure: BRONCHOSCOPY FLEXIBLE;;  Surgeon: Wilber Lin MD;  Location:  GI     BRONCHOSCOPY FLEXIBLE AND RIGID N/A 9/10/2018    Procedure: BRONCHOSCOPY FLEXIBLE AND RIGID;  Flexible and Rigid Bronchoscopy with Balloon Dilation, tissue debulking with cryo, and Right mainstem bronchus stent placement;  Surgeon: Wilber Lin MD;  [facility]     BRONCHOSCOPY RIGID N/A 6/6/2018    Procedure: BRONCHOSCOPY RIGID;;  Surgeon: Lopez Macias MD;  Location:  GI     BRONCHOSCOPY, DILATE BRONCHUS, STENT BRONCHUS, COMBINED N/A 6/11/2018    Procedure: COMBINED BRONCHOSCOPY, DILATE BRONCHUS, STENT BRONCHUS;  Flexible Bronchoscopy, Balloon Dilation, Bronchial Washings;  Surgeon: Wilber Lin MD;  Location:  OR     BRONCHOSCOPY, DILATE BRONCHUS, STENT BRONCHUS, COMBINED Right 7/10/2018    Procedure: COMBINED BRONCHOSCOPY, DILATE BRONCHUS, STENT BRONCHUS;  Flexible Bronchoscopy, Balloon Dilation, Bronchial Washings  ;  Surgeon: Wilber Lin MD;  Location:  OR     BRONCHOSCOPY,  DILATE BRONCHUS, STENT BRONCHUS, COMBINED N/A 8/2/2018    Procedure: COMBINED BRONCHOSCOPY, DILATE BRONCHUS, STENT BRONCHUS;  Flexible Bronchoscopy, Bronchial Washings, Balloon Dilation;  Surgeon: Wilber Lin MD;  [facility]     BRONCHOSCOPY, DILATE BRONCHUS, STENT BRONCHUS, COMBINED N/A 8/20/2018    Procedure: COMBINED BRONCHOSCOPY, DILATE BRONCHUS, STENT BRONCHUS;  Flexible Bronchoscopy, Balloon Dilation;  Surgeon: Wilber Lin MD;  [facility]     BRONCHOSCOPY, DILATE BRONCHUS, STENT BRONCHUS, COMBINED N/A 10/29/2018    Procedure: Flexible Bronchoscopy, Balloon Dilation, Stent Revision, Airway Examination And Therapeutic Suctioning, Cyro Tumor Debulking;  Surgeon: Wilber Lin MD;  [facility]     BRONCHOSCOPY, DILATE BRONCHUS, STENT BRONCHUS, COMBINED N/A 11/20/2018    Procedure: Rigid Bronchoscopy, Stent Removal and dilitation;  Surgeon: Wilber Lin MD;  Location:  OR     ENT SURGERY      tonsillectomy as a child     ESOPHAGOSCOPY, GASTROSCOPY, DUODENOSCOPY (EGD), COMBINED N/A 10/29/2018    Procedure: COMBINED ESOPHAGOSCOPY, GASTROSCOPY, DUODENOSCOPY (EGD) with biopsies and polypectomy;  Surgeon: Chente Bloom MD;  [facility]     INSERT EXTRACORPORAL MEMBRANE OXYGENATOR ADULT (OUTSIDE OR) N/A 2/27/2018    Procedure: INSERT EXTRACORPORAL MEMBRANE OXYGENATOR ADULT (OUTSIDE OR);  INSERT EXTRACORPORAL MEMBRANE OXYGENATOR ADULT (OUTSIDE OR) ;  Surgeon: Toby Hernandez MD;  [facility]     no prior surgery       REMOVE EXTRACORPORAL MEMBRANE OXYGENATOR ADULT N/A 3/3/2018    Procedure: REMOVE EXTRACORPORAL MEMBRANE OXYGENATOR ADULT;  Removal of Right Femoral Venous and Right Axillary Arterial Extracorporeal Membrane Oxygenator;  Surgeon: Toby Hernandez MD;  [facility]     TRANSPLANT LUNG RECIPIENT SINGLE X2 Bilateral 3/1/2018    Procedure: TRANSPLANT LUNG RECIPIENT SINGLE X2;  Median Sternotomy, Extracorporeal  Membrane Oxygenator, bilateral sequential lung transplant;  Surgeon: Toby Hernandez MD;  [facility]     ALLERGIES:   No Known Allergies    PERTINENT MEDICATIONS:    Current Outpatient Medications:      ACETAMINOPHEN PO, Take 1,000 mg by mouth every 6 hours as needed for pain, Disp: , Rfl:      albuterol (PROAIR HFA/PROVENTIL HFA/VENTOLIN HFA) 108 (90 Base) MCG/ACT Inhaler, Inhale 2 puffs into the lungs every 6 hours as needed for shortness of breath / dyspnea or wheezing, Disp: 1 Inhaler, Rfl: 1     azaTHIOprine (IMURAN) 50 MG tablet, Take 3 tablets (150 mg) by mouth every evening, Disp: 90 tablet, Rfl: 11     calcium-vitamin D (CALTRATE) 600-400 MG-UNIT per tablet, Take 1 tablet by mouth daily, Disp: , Rfl:      guaiFENesin-codeine (ROBITUSSIN AC) 100-10 MG/5ML SOLN solution, Take 5-10 mLs by mouth every 6 hours as needed for cough, Disp: 120 mL, Rfl: 0     magnesium oxide (MAG-OX) 400 MG tablet, Take 1 tablet (400 mg) by mouth daily, Disp: 90 tablet, Rfl: 3     metoprolol tartrate (LOPRESSOR) 25 MG tablet, Take 1 tablet (25 mg) by mouth 2 times daily, Disp: 60 tablet, Rfl: 11     multivitamin, therapeutic with minerals (THERA-VIT-M) TABS tablet, Take 1 tablet by mouth daily, Disp: 30 each, Rfl: 11     ondansetron (ZOFRAN) 4 MG tablet, Take 1 tablet (4 mg) by mouth every 12 hours as needed for nausea, Disp: 60 tablet, Rfl: 1     order for DME, Equipment being ordered: Nebulizer, Disp: 1 Device, Rfl: 1     order for DME, Equipment being ordered: Nebulizer, Disp: 1 Device, Rfl: 1     pantoprazole (PROTONIX) 40 MG EC tablet, Take 1 tablet (40 mg) by mouth 2 times daily, Disp: 60 tablet, Rfl: 3     pentoxifylline (TRENTAL) 400 MG CR tablet, Take 1 tablet (400 mg) by mouth 2 times daily, Disp: 60 tablet, Rfl: 11     predniSONE (DELTASONE) 2.5 MG tablet, Take 1 tablet (2.5 mg) by mouth daily 6/22/18             7.5                    7.5 7/20/18             7.5                    5 8/24/18             5                        5 9/21/18             5                       2.5 (Patient taking differently: Take 2.5 mg by mouth At Bedtime 6/22/18             7.5                    7.5 7/20/18             7.5                    5 8/24/18             5                       5 9/21/18             5                       2.5), Disp: 90 tablet, Rfl: 1     predniSONE (DELTASONE) 20 MG tablet, Take 1 tablet (20 mg) by mouth daily (Patient not taking: Reported on 12/4/2018), Disp: 5 tablet, Rfl: 0     predniSONE (DELTASONE) 5 MG tablet, Follow taper card, Disp: 180 tablet, Rfl: 3     sulfamethoxazole-trimethoprim (BACTRIM DS/SEPTRA DS) 800-160 MG per tablet, Take 1 tablet by mouth daily X one month., Disp: 30 tablet, Rfl: 1     tacrolimus (GENERIC EQUIVALENT) 1 MG capsule, Total dose: 5 mg in the AM and 4 mg in the PM, Disp: 240 capsule, Rfl: 11     valGANciclovir (VALCYTE) 450 MG tablet, Take 450 mg by mouth daily, Disp: 30 tablet, Rfl: 6    SOCIAL HISTORY:  Social History     Socioeconomic History     Marital status:      Spouse name: Not on file     Number of children: Not on file     Years of education: Not on file     Highest education level: Not on file   Social Needs     Financial resource strain: Not on file     Food insecurity - worry: Not on file     Food insecurity - inability: Not on file     Transportation needs - medical: Not on file     Transportation needs - non-medical: Not on file   Occupational History     Not on file   Tobacco Use     Smoking status: Never Smoker     Smokeless tobacco: Never Used   Substance and Sexual Activity     Alcohol use: No     Alcohol/week: 0.6 oz     Types: 1 Glasses of wine per week     Drug use: No     Sexual activity: Not on file   Other Topics Concern     Parent/sibling w/ CABG, MI or angioplasty before 65F 55M? No   Social History Narrative     Not on file       FAMILY HISTORY:  FH of CRC: None  FH of IBD: None  Family History   Problem Relation Age of Onset      Hypertension Mother      Arthritis Mother      Pancreatic Cancer Father      Diabetes Father        Past/family/social history reviewed and no changes    PHYSICAL EXAMINATION:  Constitutional: aaox3, cooperative, pleasant, not dyspneic/diaphoretic, no acute distress  Vitals reviewed: LMP 06/07/2014 (Exact Date)   Wt:   Wt Readings from Last 2 Encounters:   12/04/18 45.7 kg (100 lb 11.2 oz)   11/20/18 50 kg (110 lb 3.7 oz)      Eyes: Sclera anicteric/injected  Ears/nose/mouth/throat: Normal oropharynx without ulcers or exudate, mucus membranes moist, hearing intact  Neck: supple, thyroid normal size  CV: No edema  Respiratory: Unlabored breathing  Lymph: No axillary, submandibular, supraclavicular or inguinal lymphadenopathy  Abd: Nondistended, +bs, no hepatosplenomegaly, nontender, no peritoneal signs  Skin: warm, perfused, no jaundice  Psych: Normal affect  MSK: Normal gait      PERTINENT STUDIES:    Orders Only on 12/13/2018   Component Date Value Ref Range Status     INR 12/13/2018 1.02  0.86 - 1.14 Final     Magnesium 12/13/2018 1.6  1.6 - 2.3 mg/dL Final     Sodium 12/13/2018 134  133 - 144 mmol/L Final     Potassium 12/13/2018 5.3  3.4 - 5.3 mmol/L Final     Chloride 12/13/2018 101  94 - 109 mmol/L Final     Carbon Dioxide 12/13/2018 26  20 - 32 mmol/L Final     Anion Gap 12/13/2018 8  3 - 14 mmol/L Final     Glucose 12/13/2018 96  70 - 99 mg/dL Final     Urea Nitrogen 12/13/2018 28  7 - 30 mg/dL Final     Creatinine 12/13/2018 1.66* 0.52 - 1.04 mg/dL Final     GFR Estimate 12/13/2018 32* >60 mL/min/1.7m2 Final     GFR Estimate If Black 12/13/2018 39* >60 mL/min/1.7m2 Final     Calcium 12/13/2018 8.6  8.5 - 10.1 mg/dL Final     Sincerely,    Daron Cruz PA-C

## 2018-12-13 NOTE — PATIENT INSTRUCTIONS
Patient Instructions  1. Decrease valcyte to 450 mg three times a week (Mon, wed, Friday)  2. Stop Imuran/azathioprine   3. Start iron twice daily. Take with food to prevent nausea   4. Unit of blood today for low hemoglobin   5. See sports medicine walk in clinic first thing in the morning tomorrow.   6. Tetanus vaccine today     Next transplant clinic appointment:  1 month with CXR, labs and PFTs  Next lab draw:  Weekly     ~~~~~~~~~~~~~~~~~~~~~~~~~    Thoracic Transplant Office phone [phone], fax [phone]    Office Hours 8:30 - 5:00     For after-hours urgent issues, please dial [phone], and ask to speak with the Thoracic Transplant Coordinator  On-Call, pager [phone].  --------------------  To expedite your medication refill(s), please contact your pharmacy and have them fax a refill request to: [phone]  .   *Please allow 3 business days for routine medication refills.  *Please allow 5 business days for controlled substance medication refills.    **For Diabetic medications and supplies refill(s), please contact your pharmacy and have them  Contact your Endocrine team.  --------------------  For scheduling appointments call [facility] :  [phone]. For lab appointments call [phone] or [facility].  --------------------  Please Note: If you are active on [facility] [employer] [facility], all future test results will be sent by [employer] message only, and will no longer be called to patient. You may also receive communication directly from your physician.

## 2018-12-13 NOTE — TELEPHONE ENCOUNTER
Received report over the phone that patients Hgb was 6.5. This patient lab was not ordered by a physician in the clinic. This lab was reported to provider MERRICK Rivera who has requested that her lung transplant team be [id number]    Lab was contacted  By me to inform that  needs to reported to the proper physician.  Lab stated that  the physician  Alex Hale has been paged several times by the lab.     Lab will continue to try to contact the patient.     Called the lung transplant team reported the critical value to the nurse receiving critical values for the week.     Transplant coordinator Nasreen then called the patient with the plan of care and updated the GI clinic appropriately.

## 2018-12-13 NOTE — OR NURSING
Pt is scheduled for a procedure tomorrow with Dr Lin. I called Norah Oconnor to let her know pt's Hemoglobin today was 6.5. Pt has an appt at 1350 today. Norah said the transplant clinic usually does pre-op H&P's for the Bronchoscopies.

## 2018-12-13 NOTE — LETTER
"12/13/2018       RE: Kecia Blue  [address] [place] [address]     Dear Colleague,    Thank you for referring your patient, Kecia Blue, to the [facility] SPORTS AND ORTHOPAEDIC WALK IN CLINIC at [facility]. Please see a copy of my visit note below.          SPORTS & ORTHOPEDIC WALK-IN VISIT 12/13/2018    Primary Care Physician: Dr. Vu    Started in left knee a few months ago. Had a cortisone shot and is fine. Xrays showed some arthritis in both knees. Done in [place].   Now having right knee pain. Comes and goes but really acting up today. Nothing happened to cause it. Not very painful, just irritating. Swelling in both knees.   Surgery tomorrow bronchoscopy     Reason for visit:     What part of your body is injured / painful?  right knee    What caused the injury /pain? Unsure    How long ago did your injury occur or pain begin? several months ago    What are your most bothersome symptoms? Pain, Swelling and Other: locking    How would you characterize your symptom?  aching    What makes your symptoms better? Ice, Heat and Tylenol    What makes your symptoms worse? Other: nothing    Have you been previously seen for this problem? No    Medical History:    Any recent changes to your medical history? No    Any new medication prescribed since last visit? No    Have you had surgery on this body part before? No    Social History:    Occupation:     Handedness: Right    Exercise: not recently        HISTORY OF PRESENT ILLNESS  Ms. Blue is a pleasant 58 year old year old female status post lung transplant who presents to clinic today with right knee pain.   Kecia explains that she had left knee pain following an incident during PT in May of this year. Following that she had left knee pain and swelling. For this she had a cortisone injection with resolution of symptoms. However, over the last few months she has noticed \"stiffness\" in the " "right knee. This is worse when she wakes in the morning. With this she has noticed enlargement of her right knee. Patient does endorse issues with fluid retention since receiving lung transplant. Today was the first time she experienced pain in the knee. This was an achy \"fullness\" in the right knee. With this she had troubles bending the knee. Denies any redness or warmth to the right knee.     Further, patient does report extensive history of lower back pain. With this she has radiation of discomfort down the posterior aspect of her right leg. At times she needs a pillow under her right side when in a vehicle for extended periods of time. Also has pain and discomfort when walking or standing for more than 10 minutes. Denies any numbness or tingling in the lower extremities. Patient does endorse becoming very deconditioned secondary to her current medical conditions.     Patient is from [place] and has been following with pulmonology here.     Location: right knee   Quality:  Achy \"fullness\" pain    Severity: 5/10 at worst    Duration: 1 day   Timing: occurs after waking   Modifying factors:  resting and non-use makes it better, bending the knee and use makes it worse  Associated signs & symptoms: knee swelling. Lower back pain with right posterior leg pain   Previous similar pain: no  Exercise: none   Additional history: as documented    MEDICAL HISTORY  Patient Active Problem List   Diagnosis     Acute on chronic respiratory failure with hypoxia (H)     ILD (interstitial lung disease) (H)     Lung transplant recipient (H)     Steroid-induced hyperglycemia     Deep vein thrombosis (DVT) (H) [I82.409]     Encounter for long-term (current) use of high-risk medication     Dehydration     Acute kidney injury (H)     CMV (cytomegalovirus) (H)     Nausea and vomiting     Low hemoglobin       Current Outpatient Medications   Medication Sig Dispense Refill     ACETAMINOPHEN PO Take 1,000 mg by mouth every 6 hours as " needed for pain       albuterol (PROAIR HFA/PROVENTIL HFA/VENTOLIN HFA) 108 (90 Base) MCG/ACT Inhaler Inhale 2 puffs into the lungs every 6 hours as needed for shortness of breath / dyspnea or wheezing 1 Inhaler 1     azaTHIOprine (IMURAN) 50 MG tablet Take 3 tablets (150 mg) by mouth every evening 90 tablet 11     calcium-vitamin D (CALTRATE) 600-400 MG-UNIT per tablet Take 1 tablet by mouth daily       dronabinol (MARINOL) 5 MG capsule Take 1 capsule (5 mg) by mouth 2 times daily (before meals) 60 capsule 0     ferrous sulfate (FEROSUL) 325 (65 Fe) MG tablet Take 1 tablet (325 mg) by mouth 2 times daily 60 tablet 2     guaiFENesin-codeine (ROBITUSSIN AC) 100-10 MG/5ML SOLN solution Take 5-10 mLs by mouth every 6 hours as needed for cough 120 mL 0     magnesium oxide (MAG-OX) 400 MG tablet Take 1 tablet (400 mg) by mouth daily 90 tablet 3     metoprolol tartrate (LOPRESSOR) 25 MG tablet Take 1 tablet (25 mg) by mouth 2 times daily 60 tablet 11     multivitamin, therapeutic with minerals (THERA-VIT-M) TABS tablet Take 1 tablet by mouth daily 30 each 11     ondansetron (ZOFRAN) 4 MG tablet Take 1 tablet (4 mg) by mouth every 12 hours as needed for nausea 60 tablet 1     order for DME Equipment being ordered: Nebulizer 1 Device 1     order for DME Equipment being ordered: Nebulizer 1 Device 1     pantoprazole (PROTONIX) 40 MG EC tablet Take 1 tablet (40 mg) by mouth 2 times daily 60 tablet 3     pentoxifylline (TRENTAL) 400 MG CR tablet Take 1 tablet (400 mg) by mouth 2 times daily 60 tablet 11     predniSONE (DELTASONE) 5 MG tablet Take 5mg in AM and 2.5mg in AM.. 180 tablet 3     sulfamethoxazole-trimethoprim (BACTRIM DS/SEPTRA DS) 800-160 MG per tablet Take 1 tablet by mouth daily X one month. 30 tablet 1     tacrolimus (GENERIC EQUIVALENT) 1 MG capsule Total dose: 5 mg in the AM and 4 mg in the  capsule 11     [START ON 12/14/2018] valGANciclovir (VALCYTE) 450 MG tablet Take 1 tablet (450 mg) by mouth three  times a week 12 tablet 11       No Known Allergies    Family History   Problem Relation Age of Onset     Hypertension Mother      Arthritis Mother      Pancreatic Cancer Father      Diabetes Father      Additional medical/Social/Surgical histories reviewed in [facility] and updated as appropriate.     PHYSICAL EXAM  Vitals:    12/13/18 1758   BP: 135/83   Pulse: 85   Resp: 16     Vital Signs: /83   Pulse 85   Resp 16   LMP 06/07/2014 (Exact Date)  Patient declined being weighed. There is no height or weight on file to calculate BMI.    General  - normal appearance, in no obvious distress  CV  - normal peripheral perfusion. Normal dorsalis pedis pulse.   Pulm  - normal respiratory pattern, non-labored  Musculoskeletal - lumbar spine  - stance: mild antalgic gait, no obvious leg length discrepancy,   - inspection: normal bone and joint alignment, no obvious scoliosis  - palpation: no paravertebral or bony tenderness  - ROM: normal flexion, extension, sidebending, rotation, causes pain in low back on right  - strength: lower extremities 5/5 in all planes  - special tests:  (+) straight leg raise- right  (+) slump test- right low back pain  Neuro  - patellar and Achilles DTRs 2+ bilaterally, no lower extremity sensory deficit throughout L5 distribution, grossly normal coordination, normal muscle tone  Skin  - no ecchymosis, erythema, warmth, or induration, no obvious rash  Psych  - interactive, appropriate, normal mood and affect    Musculoskeletal - knee right   - stance: mildly antalgic gait, genu varum  - inspection: generalized swelling, bilateral effusion  - palpation: medial joint line tenderness on the right, patella and patellar tendon non-tender, normal popliteal pulse  - ROM: 100 degrees flexion, 0 degrees extension, painful active ROM  - strength: 5/5 in flexion, 5/5 in extension  - neuro: no sensory or motor deficit  - special tests:  (-) Lachman  (-) anterior drawer  (-) posterior drawer  (-) pivot  shift  (-) Kenan  (-) Thessaly  (-) varus at 0 and 30 degrees flexion  (-) valgus at 0 and 30 degrees flexion  (+) Mango s compression test  (-) patellar apprehension    ASSESSMENT & PLAN  Kecia Blue is a 58 year old female who presents for evaluation of right knee pain consistent with osteoarthritis with knee effusion.     -Orders placed for bilateral knee x-ray and lumbar spine x-ray. Patient elected to pursue more conservative management at this time and understands that orders are valid for the next 90 days if they want further workup.   -Follow up in one week if not improving   -Discussed ice, compression, and Tylenol for knee swelling and pain   -Advised further evaluation of back pain if remaining symptomatic     Kd Coleman MD, CAM

## 2018-12-13 NOTE — TELEPHONE ENCOUNTER
Pt was called and instructed to have additional bloodwork done for recent hgb level of 6.5. Pt instructed to be seen in clinic at 1:50 today. Possible blood transfusion pending labs.

## 2018-12-13 NOTE — PATIENT INSTRUCTIONS
Dear Kecia Blue    Thank you for choosing [facility] ([facility]) for your blood transfusion.  The following information is a summary of our appointment as well as important reminders.      After Your Blood Transfusion  Discharge Instructions  After you leave  After a blood transfusion (received red blood cells, platelets, plasma, cryo or granulocytes), you will need to watch for signs of a reaction for the next 48 hours.  Call your clinic or 911 (or go to the Emergency room) if you have any signs of a reaction:    Shaking or chills    Fever (temperature above 100.0 F)    Headache    Nausea    Hives    Itching    Swelling of the face or feeling flushed    Ongoing dry cough (nothing is coughed up)    Trouble breathing or wheezing  Some signs of a reaction won't show up for a few days or up to 4 weeks.   These may include:    Fatigue    Dizziness    Pink or red urine    For informational purposes only. Not to replace the advice of your health care provider.   Copyright   2015 [employer]. All rights reserved. [facility] 310787 - Rev 07/16.       We look forward in seeing you on your next appointment here at [facility].  Please don t hesitate to call us at [phone] to reschedule any of your appointments or to speak with one of the [facility] registered nurses.  It was a pleasure taking care of you today.    Sincerely,    [facility]  [address] [place] [address]  Phone:  [phone]

## 2018-12-13 NOTE — LETTER
12/13/2018      RE: Kecia Blue  [address] [place]  [facility] [address]       Reason for Visit  Kecia Blue is a 57-year-old female who is being seen for RECHECK (lung tx)      Lung TX HPI  Kecia Blue is a 57-year-old female with a history of dermatomyositis, seronegative rheumatoid arthritis, interstitial lung disease, and pulmonary hypertension who was admitted to the hospital with acute worsening of chronic hypoxic respiratory failure in 02/2018 and progressed [facility] for right heart failure and subsequently underwent bilateral sequential lung transplant on 03/01/2018.  Her post-transplant course was generally uncomplicated; however, she needed pleural effusion tap on the right side later.  She has had significant narrowing of right bronchial anastomosis with significant granulation tissue blocking the mainstem.  She has required multiple dilations and stent placement.       Interval HPI  Since last clinic visit about 8-10 days ago she has been feeling much better.  She is received 2 L of IV fluids at the last clinic visit and a liter daily for 2 days following that.  Since last Friday she has not received any fluids and her appetite has recovered has returned.  She does not have any nausea vomiting or loose stools.  She is eating fairly well and has been able to drink about 70 ounces of fluids every day.  Overall she is feeling much better.  She denies having any wheezing still has some shortness of breath with walking short distances.  She also notes cough which is improved but is still present.  Her cough is a bit more at nighttime.  But is able to fall asleep easily.  She denies any significant change in her pedal edema.    She denies any fevers chills or night sweats.    She also denies having any melena or hematochezia.  Denies have any hematemesis.  No black colored stools.      Current Outpatient Medications   Medication     ACETAMINOPHEN PO     albuterol (PROAIR  HFA/PROVENTIL HFA/VENTOLIN HFA) 108 (90 Base) MCG/ACT Inhaler     azaTHIOprine (IMURAN) 50 MG tablet     calcium-vitamin D (CALTRATE) 600-400 MG-UNIT per tablet     guaiFENesin-codeine (ROBITUSSIN AC) 100-10 MG/5ML SOLN solution     magnesium oxide (MAG-OX) 400 MG tablet     metoprolol tartrate (LOPRESSOR) 25 MG tablet     multivitamin, therapeutic with minerals (THERA-VIT-M) TABS tablet     ondansetron (ZOFRAN) 4 MG tablet     order for DME     order for DME     pantoprazole (PROTONIX) 40 MG EC tablet     pentoxifylline (TRENTAL) 400 MG CR tablet     predniSONE (DELTASONE) 2.5 MG tablet     predniSONE (DELTASONE) 5 MG tablet     sulfamethoxazole-trimethoprim (BACTRIM DS/SEPTRA DS) 800-160 MG per tablet     tacrolimus (GENERIC EQUIVALENT) 1 MG capsule     valGANciclovir (VALCYTE) 450 MG tablet     predniSONE (DELTASONE) 20 MG tablet     No current facility-administered medications for this visit.        No Known Allergies      Past Medical History:   Diagnosis Date     Antisynthetase syndrome (H) 2014     Chronic cough      Chronic infection     recent C diff  8/18     Dehydration 8/1/2018     Dermatomyositis (H)      Dysplasia of cervix, low grade (ESTRADA 1)      ILD (interstitial lung disease) (H)      Osteopenia      PONV (postoperative nausea and vomiting)      Pulmonary hypertension (H)      Raynaud's disease      Seronegative rheumatoid arthritis (H)        Past Surgical History:   Procedure Laterality Date     BRONCHOSCOPY (RIGID OR FLEXIBLE), DIAGNOSTIC N/A 4/10/2018    Procedure: COMBINED BRONCHOSCOPY (RIGID OR FLEXIBLE), LAVAGE;;  Surgeon: Mariposa Donohue MD;  [facility]     BRONCHOSCOPY (RIGID OR FLEXIBLE), DILATE BRONCHUS / TRACHEA N/A 10/11/2018    Procedure: BRONCHOSCOPY (RIGID OR FLEXIBLE), DILATE BRONCHUS / TRACHEA;  Flexible And Rigid Bronchoscopy and Dilation;  Surgeon: Wilber Lin MD;  [facility]     BRONCHOSCOPY FLEXIBLE N/A 3/13/2018    Procedure: BRONCHOSCOPY FLEXIBLE;   Flexible Bronchoscopy ;  Surgeon: Gissell Sanchez MD;  [facility]     BRONCHOSCOPY FLEXIBLE N/A 5/9/2018    Procedure: BRONCHOSCOPY FLEXIBLE;;  Surgeon: Wilber Lin MD;  [facility]     BRONCHOSCOPY FLEXIBLE AND RIGID N/A 9/10/2018    Procedure: BRONCHOSCOPY FLEXIBLE AND RIGID;  Flexible and Rigid Bronchoscopy with Balloon Dilation, tissue debulking with cryo, and Right mainstem bronchus stent placement;  Surgeon: Wilber Lin MD;  [facility]     BRONCHOSCOPY RIGID N/A 6/6/2018    Procedure: BRONCHOSCOPY RIGID;;  Surgeon: Lopez Macias MD;  [facility]     BRONCHOSCOPY, DILATE BRONCHUS, STENT BRONCHUS, COMBINED N/A 6/11/2018    Procedure: COMBINED BRONCHOSCOPY, DILATE BRONCHUS, STENT BRONCHUS;  Flexible Bronchoscopy, Balloon Dilation, Bronchial Washings;  Surgeon: Wilber Lin MD;  [facility]     BRONCHOSCOPY, DILATE BRONCHUS, STENT BRONCHUS, COMBINED Right 7/10/2018    Procedure: COMBINED BRONCHOSCOPY, DILATE BRONCHUS, STENT BRONCHUS;  Flexible Bronchoscopy, Balloon Dilation, Bronchial Washings  ;  Surgeon: Wilber Lin MD;  [facility]     BRONCHOSCOPY, DILATE BRONCHUS, STENT BRONCHUS, COMBINED N/A 8/2/2018    Procedure: COMBINED BRONCHOSCOPY, DILATE BRONCHUS, STENT BRONCHUS;  Flexible Bronchoscopy, Bronchial Washings, Balloon Dilation;  Surgeon: Wilber Lin MD;  [facility]     BRONCHOSCOPY, DILATE BRONCHUS, STENT BRONCHUS, COMBINED N/A 8/20/2018    Procedure: COMBINED BRONCHOSCOPY, DILATE BRONCHUS, STENT BRONCHUS;  Flexible Bronchoscopy, Balloon Dilation;  Surgeon: Wilber Lin MD;  [facility]     BRONCHOSCOPY, DILATE BRONCHUS, STENT BRONCHUS, COMBINED N/A 10/29/2018    Procedure: Flexible Bronchoscopy, Balloon Dilation, Stent Revision, Airway Examination And Therapeutic Suctioning, Cyro Tumor Debulking;  Surgeon: Wilber Lin MD;  [facility]     BRONCHOSCOPY, DILATE BRONCHUS, STENT  BRONCHUS, COMBINED N/A 11/20/2018    Procedure: Rigid Bronchoscopy, Stent Removal and dilitation;  Surgeon: Wilber Lin MD;  [facility]     ENT SURGERY      tonsillectomy as a child     ESOPHAGOSCOPY, GASTROSCOPY, DUODENOSCOPY (EGD), COMBINED N/A 10/29/2018    Procedure: COMBINED ESOPHAGOSCOPY, GASTROSCOPY, DUODENOSCOPY (EGD) with biopsies and polypectomy;  Surgeon: Chente Bloom MD;  [facility]     INSERT EXTRACORPORAL MEMBRANE OXYGENATOR ADULT (OUTSIDE OR) N/A 2/27/2018    Procedure: INSERT EXTRACORPORAL MEMBRANE OXYGENATOR ADULT (OUTSIDE OR);  INSERT EXTRACORPORAL MEMBRANE OXYGENATOR ADULT (OUTSIDE OR) ;  Surgeon: Toby Hernandez MD;  Location:  OR     no prior surgery       REMOVE EXTRACORPORAL MEMBRANE OXYGENATOR ADULT N/A 3/3/2018    Procedure: REMOVE EXTRACORPORAL MEMBRANE OXYGENATOR ADULT;  Removal of Right Femoral Venous and Right Axillary Arterial Extracorporeal Membrane Oxygenator;  Surgeon: Toby Hernandez MD;  Location:  OR     TRANSPLANT LUNG RECIPIENT SINGLE X2 Bilateral 3/1/2018    Procedure: TRANSPLANT LUNG RECIPIENT SINGLE X2;  Median Sternotomy, Extracorporeal Membrane Oxygenator, bilateral sequential lung transplant;  Surgeon: Toby Hernandez MD;  Location:  OR       Social History     Socioeconomic History     Marital status:      Spouse name: Not on file     Number of children: Not on file     Years of education: Not on file     Highest education level: Not on file   Social Needs     Financial resource strain: Not on file     Food insecurity - worry: Not on file     Food insecurity - inability: Not on file     Transportation needs - medical: Not on file     Transportation needs - non-medical: Not on file   Occupational History     Not on file   Tobacco Use     Smoking status: Never Smoker     Smokeless tobacco: Never Used   Substance and Sexual Activity     Alcohol use: No     Alcohol/week: 0.6 oz     Types: 1  Glasses of wine per week     Drug use: No     Sexual activity: Not on file   Other Topics Concern     Parent/sibling w/ CABG, MI or angioplasty before 65F 55M? No   Social History Narrative     Not on file       Family History   Problem Relation Age of Onset     Hypertension Mother      Arthritis Mother      Pancreatic Cancer Father      Diabetes Father        Pulmonary ROS  Constitutional- Positive. Gained weight  Eyes- Negative  Ear, nose and throat- Positive. Rhinorrhea.  Cardiac- Negative  Pulm- See HPI  GI- Negative.  Genitourinary- Negative  Musculoskeletal- Positive. Left knee pain and inability to straighten the knees  Neurology- Negative  Dermatology- Negative  Endocrine- Negative  Lymphatic- Negative  Psychiatry- Negative  A complete ROS was otherwise negative except as noted in the HPI.      /70   Pulse 86   Temp 98.2  F (36.8  C) (Oral)   LMP 06/07/2014 (Exact Date)   SpO2 100%   Physical Exam  GENERAL APPEARANCE: Alert, Oriented x3. Not in distress.  EYES: PERRL, EOMI  HENT: Nasal mucosa with no hyperemia and no edema, no nasal polyps.  MOUTH: Oral mucosa is moist, without any lesions, no tonsillar enlargement, no oropharyngeal exudate.  NECK: Supple, no masses, no thyromegaly.  LYMPHATICS: No significant axillary, cervical, or supraclavicular nodes.  RESP: Normal percussion, good air flow throughout Left lung. Rt. lung insp wheeze heard and poor exhalation.  CV: Normal S1, S2, regular rhythm, normal rate, no rub, no murmur,  no gallop. Trace álvaro LE edema.  ABDOMEN: Bowel sounds normal, soft, nontender, no HSM or masses.  MS: Extremities normal, no clubbing, no cyanosis.  Álvaro knee effusions.  SKIN: No rash on limited exam.  NEURO: Mentation intact, speech normal, normal strength and tone, normal gait, and stance.  PSYCH: Mentation appears normal, and affect normal/bright.      Results  Recent Results (from the past 168 hour(s))   Tacrolimus level    Collection Time: 12/13/18  7:27 AM   Result  Value Ref Range    Tacrolimus Last Dose LD 18:30     Tacrolimus Level 13.0 5.0 - 15.0 ug/L   INR    Collection Time: 12/13/18  7:27 AM   Result Value Ref Range    INR 1.02 0.86 - 1.14   CBC with platelets    Collection Time: 12/13/18  7:27 AM   Result Value Ref Range    WBC 1.8 (L) 4.0 - 11.0 10e9/L    RBC Count 2.08 (L) 3.8 - 5.2 10e12/L    Hemoglobin 6.5 (LL) 11.7 - 15.7 g/dL    Hematocrit 22.2 (L) 35.0 - 47.0 %     (H) 78 - 100 fl    MCH 31.3 26.5 - 33.0 pg    MCHC 29.3 (L) 31.5 - 36.5 g/dL    RDW 18.9 (H) 10.0 - 15.0 %    Platelet Count 674 (H) 150 - 450 10e9/L   Magnesium    Collection Time: 12/13/18  7:27 AM   Result Value Ref Range    Magnesium 1.6 1.6 - 2.3 mg/dL   Basic metabolic panel    Collection Time: 12/13/18  7:27 AM   Result Value Ref Range    Sodium 134 133 - 144 mmol/L    Potassium 5.3 3.4 - 5.3 mmol/L    Chloride 101 94 - 109 mmol/L    Carbon Dioxide 26 20 - 32 mmol/L    Anion Gap 8 3 - 14 mmol/L    Glucose 96 70 - 99 mg/dL    Urea Nitrogen 28 7 - 30 mg/dL    Creatinine 1.66 (H) 0.52 - 1.04 mg/dL    GFR Estimate 32 (L) >60 mL/min/1.7m2    GFR Estimate If Black 39 (L) >60 mL/min/1.7m2    Calcium 8.6 8.5 - 10.1 mg/dL   FERRITIN    Collection Time: 12/13/18 10:33 AM   Result Value Ref Range    Ferritin 302 (H) 8 - 252 ng/mL   IRON AND IRON BINDING CAPACITY    Collection Time: 12/13/18 10:33 AM   Result Value Ref Range    Iron 16 (L) 35 - 180 ug/dL    Iron Binding Cap 221 (L) 240 - 430 ug/dL    Iron Saturation Index 7 (L) 15 - 46 %   Reticulocyte Count    Collection Time: 12/13/18 10:33 AM   Result Value Ref Range    % Retic 4.6 (H) 0.5 - 2.0 %    Absolute Retic 94.2 25 - 95 10e9/L   CBC with platelets differential    Collection Time: 12/13/18 10:33 AM   Result Value Ref Range    WBC 1.6 (L) 4.0 - 11.0 10e9/L    RBC Count 2.07 (L) 3.8 - 5.2 10e12/L    Hemoglobin 6.6 (LL) 11.7 - 15.7 g/dL    Hematocrit 22.2 (L) 35.0 - 47.0 %     (H) 78 - 100 fl    MCH 31.9 26.5 - 33.0 pg    MCHC 29.7 (L)  31.5 - 36.5 g/dL    RDW 19.0 (H) 10.0 - 15.0 %    Platelet Count 773 (H) 150 - 450 10e9/L    Diff Method Manual Differential     % Neutrophils 54.1 %    % Lymphocytes 15.3 %    % Monocytes 27.0 %    % Eosinophils 1.8 %    % Basophils 1.8 %    Absolute Neutrophil 0.9 (L) 1.6 - 8.3 10e9/L    Absolute Lymphocytes 0.2 (L) 0.8 - 5.3 10e9/L    Absolute Monocytes 0.4 0.0 - 1.3 10e9/L    Absolute Eosinophils 0.0 0.0 - 0.7 10e9/L    Absolute Basophils 0.0 0.0 - 0.2 10e9/L    Anisocytosis Moderate     Poikilocytosis Moderate     Teardrop Cells Slight     Ovalocytes Slight     Macrocytes Present     Hypochromasia Present     Platelet Estimate Confirming automated cell count    Lactate Dehydrogenase    Collection Time: 12/13/18 10:33 AM   Result Value Ref Range    Lactate Dehydrogenase 197 81 - 234 U/L   ABO/Rh type and screen    Collection Time: 12/13/18 10:33 AM   Result Value Ref Range    Units Ordered 1     ABO A     RH(D) Pos     Antibody Screen Neg     Test Valid Only At          [facility] [employer]    Specimen Expires 12/16/2018     Crossmatch Red Blood Cells    Blood component    Collection Time: 12/13/18 10:33 AM   Result Value Ref Range    Unit Number [id number]     Blood Component Type Red Blood Cells Leukocyte Reduced     Division Number 00     Status of Unit IN-TRANSIT     Blood Product Code [id number]     Unit Status          Results as noted above.    PFT Interpretation:  Severe obstructive ventilatory defect along with severe restriction.  FEV1 has improved by 440ml.  FEV1 remains below best of 1.27L.  Valid Maneuver    CXR (12/4/2018), personally reviewed by me: The Lung fields are clear. No effusion. Cardiac silhouette is WNL.      Assessment and Plan: Kecia Blue is a 57-year-old female with a history of dermatomyositis, seronegative rheumatoid arthritis, interstitial lung disease, and pulmonary hypertension who was admitted to the hospital with acute worsening of  chronic hypoxic respiratory failure in 02/2018 and progressed [facility] for right [facility] failure and subsequently underwent bilateral sequential lung transplant on 03/01/2018. Her post-transplant course was generally uncomplicated; however, she needed pleural effusion tap on the right side later. She has had significant narrowing of right bronchial anastomosis with significant granulation tissue blocking the mainstem.  She has required multiple dilations and stent placement.     # Bilateral Lung Transplant: She severe restriction with low PFT's. Etiology unclear - ?chest wall restriction. Possible diaphram weakness/paralysis. Deconditioning might be playing a role.  Current IS regimen:   - Tacrolimus with a goal of 10-15 nanograms/mL, AZA 150mg QHS and prednisone. Off MMF due to recurrent N/V    DSA positive with DQB7 previously. Last DSA checked was neg (11/19/18).    12/13/2018: We will stop AZA given leucopenia and worsening anemia. Restart AZA when WBC is > 3.5.    # Right Main Bronchial Stenosis S/P Dilatation: Granulation tissue at the Rt. mainstem opening - it was removed. Last Dilation with stent placement on 10/29/18  12/13/2018: Cont Saline nebs BID. Last FEV1 has improved but not back to baseline. Lung exam is good - not suggestive of stenosis.    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 On Valcyte 450mg/day. Previous bronchs have had a significantly elevated CMV level.   12/4/2018: Bronch washing/BAL CMV improving - last done on 11/20/18. CMV in plasma is low level based on external lab but was negative from our lab on 11/19/18. For now we will continue Valcyte untill both are negative. Last spike in plasma level on 7/20/18.  12/13/2018: Will decrease valcyte to three times a week.  H/o C. diff colitis: Finished a  10day course of Oral Vancomycin.  High risk donor: Will need labs in 12 months.    # Provoked Left Upper Extremity Clot and Right IJ Clot (3/7/18): completed 6months of anticoag (Coumadin).    # Dermatomyositis with Raynaud's Phenomenon: Followed by rheumatology.  - Myositis panel is positive.    # Hoarse Voice: She does complain of some hoarseness and is scheduled to see ENT, has vocal cord polyp seen at there last bronch.    # CKD, Stage III: Cr is stable despite significant N/V/D.  12/13/2018: Cr is worse today. Etiology unclear. Will repeat in 3 - 4days. Has not received IVF in six days.   # Hypomagnesemia: The patient remains on magnesium replacement. Her magnesium level today is acceptable.    # Anemia: with hgb of 7.6 down from baseline 8-9s. No s/s of bleeding. Received 1 U PRBC during admission, suspect possible occult bleeding from C diff. Hgb is stable at 7.5. Iron panel, folate/B12 WNL.  12/13/2018: Low Hgb, will give 1UPRBC. Will start FeSO4 BID. Doubt blood loss, most likely also has BM suppression.  - Checking LDH/Haptoglobin and PBS.    # Leucopenia: Will stop AZA for now and follow CBC weekly. Interestingly has thrombocytosis.    # Nausea/Vomitting and Early Satiety: Worsened with the Bactrim. CT abd pelvis on 8/20/18 mildly dilated loops of SB.  On PPI BID.  EGD on 10/29/18 - multiple gastric polyps. Normal esophagus. Duodenal bx was consistent with MMF toxicity.  10/9/2018: She continues to have a poor appetite. On Reglan BID, the nausea has not been the primary limiting factor.   - Prescribed Marinol 5 mg twice a day.  12/13/2018: All resolved. Stool studies were negative. Seen by GI. If N/V recurs will proceed with EGD.    # HTN: Well controlled on Metoprolol 25mg BID.    # Bilateral Knee Pain: She has bilateral knee pain, which is due to arthritis, per patient. She received a Cortisone injection in left knee recently and this helped her left knee pain and edema. Right knee is painful and  edematous today (10/9/2018).  12/13/2018: Will have her follow up with Sports medicine clinic. Has Álvaro knee effusions with inability to straighten her knee.    # Health Care Maintenance: She has had influenza vaccination for 2018/2019 season.  12/4/2018: Will give TDaP Booster today and start Shingrix vaccination series next week      RTC in 4 weeks with labs including spirometry and xray.      Tarik Christensen MD

## 2018-12-13 NOTE — NURSING NOTE
Chief Complaint   Patient presents with     RECHECK     lung tx     /70   Pulse 86   Temp 98.2  F (36.8  C) (Oral)   LMP 06/07/2014 (Exact Date)   SpO2 100%   Elli Anna MA

## 2018-12-13 NOTE — NURSING NOTE
"Chief Complaint   Patient presents with     New Patient     New patient consult       Vitals:    12/13/18 0828   BP: 110/70   Pulse: 85   Temp: 98.5  F (36.9  C)   TempSrc: Oral   SpO2: 95%   Weight: 50.6 kg (111 lb 8 oz)   Height: 1.626 m (5' 4\")       Body mass index is 19.14 kg/m .  KVNG Sanchez                          "

## 2018-12-13 NOTE — PROGRESS NOTES
Reason for Visit  Kecia Blue is a 57-year-old female who is being seen for RECHECK (lung tx)      Lung TX HPI  Kecia Blue is a 57-year-old female with a history of dermatomyositis, seronegative rheumatoid arthritis, interstitial lung disease, and pulmonary hypertension who was admitted to the hospital with acute worsening of chronic hypoxic respiratory failure in 02/2018 and progressed [facility] for right heart failure and subsequently underwent bilateral sequential lung transplant on 03/01/2018.  Her post-transplant course was generally uncomplicated; however, she needed pleural effusion tap on the right side later.  She has had significant narrowing of right bronchial anastomosis with significant granulation tissue blocking the mainstem.  She has required multiple dilations and stent placement.       Interval HPI  Since last clinic visit about 8-10 days ago she has been feeling much better.  She is received 2 L of IV fluids at the last clinic visit and a liter daily for 2 days following that.  Since last Friday she has not received any fluids and her appetite has recovered has returned.  She does not have any nausea vomiting or loose stools.  She is eating fairly well and has been able to drink about 70 ounces of fluids every day.  Overall she is feeling much better.  She denies having any wheezing still has some shortness of breath with walking short distances.  She also notes cough which is improved but is still present.  Her cough is a bit more at nighttime.  But is able to fall asleep easily.  She denies any significant change in her pedal edema.    She denies any fevers chills or night sweats.    She also denies having any melena or hematochezia.  Denies have any hematemesis.  No black colored stools.      Current Outpatient Medications   Medication     ACETAMINOPHEN PO     albuterol (PROAIR HFA/PROVENTIL HFA/VENTOLIN HFA) 108 (90 Base) MCG/ACT Inhaler     azaTHIOprine (IMURAN) 50 MG tablet      calcium-vitamin D (CALTRATE) 600-400 MG-UNIT per tablet     guaiFENesin-codeine (ROBITUSSIN AC) 100-10 MG/5ML SOLN solution     magnesium oxide (MAG-OX) 400 MG tablet     metoprolol tartrate (LOPRESSOR) 25 MG tablet     multivitamin, therapeutic with minerals (THERA-VIT-M) TABS tablet     ondansetron (ZOFRAN) 4 MG tablet     order for DME     order for DME     pantoprazole (PROTONIX) 40 MG EC tablet     pentoxifylline (TRENTAL) 400 MG CR tablet     predniSONE (DELTASONE) 2.5 MG tablet     predniSONE (DELTASONE) 5 MG tablet     sulfamethoxazole-trimethoprim (BACTRIM DS/SEPTRA DS) 800-160 MG per tablet     tacrolimus (GENERIC EQUIVALENT) 1 MG capsule     valGANciclovir (VALCYTE) 450 MG tablet     predniSONE (DELTASONE) 20 MG tablet     No current facility-administered medications for this visit.        No Known Allergies      Past Medical History:   Diagnosis Date     Antisynthetase syndrome (H) 2014     Chronic cough      Chronic infection     recent C diff  8/18     Dehydration 8/1/2018     Dermatomyositis (H)      Dysplasia of cervix, low grade (ESTRADA 1)      ILD (interstitial lung disease) (H)      Osteopenia      PONV (postoperative nausea and vomiting)      Pulmonary hypertension (H)      Raynaud's disease      Seronegative rheumatoid arthritis (H)        Past Surgical History:   Procedure Laterality Date     BRONCHOSCOPY (RIGID OR FLEXIBLE), DIAGNOSTIC N/A 4/10/2018    Procedure: COMBINED BRONCHOSCOPY (RIGID OR FLEXIBLE), LAVAGE;;  Surgeon: Mariposa Donohue MD;  Location:  GI     BRONCHOSCOPY (RIGID OR FLEXIBLE), DILATE BRONCHUS / TRACHEA N/A 10/11/2018    Procedure: BRONCHOSCOPY (RIGID OR FLEXIBLE), DILATE BRONCHUS / TRACHEA;  Flexible And Rigid Bronchoscopy and Dilation;  Surgeon: Wilber Lin MD;  [facility]: [facility]     BRONCHOSCOPY FLEXIBLE N/A 3/13/2018    Procedure: BRONCHOSCOPY FLEXIBLE;  Flexible Bronchoscopy ;  Surgeon: Gissell Sanchez MD;  Location:  GI     BRONCHOSCOPY FLEXIBLE N/A  5/9/2018    Procedure: BRONCHOSCOPY FLEXIBLE;;  Surgeon: Wilber Lin MD;  Location: [facility]     BRONCHOSCOPY FLEXIBLE AND RIGID N/A 9/10/2018    Procedure: BRONCHOSCOPY FLEXIBLE AND RIGID;  Flexible and Rigid Bronchoscopy with Balloon Dilation, tissue debulking with cryo, and Right mainstem bronchus stent placement;  Surgeon: Wilber Lin MD;  [facility]     BRONCHOSCOPY RIGID N/A 6/6/2018    Procedure: BRONCHOSCOPY RIGID;;  Surgeon: Lopez Macias MD;  [facility]     BRONCHOSCOPY, DILATE BRONCHUS, STENT BRONCHUS, COMBINED N/A 6/11/2018    Procedure: COMBINED BRONCHOSCOPY, DILATE BRONCHUS, STENT BRONCHUS;  Flexible Bronchoscopy, Balloon Dilation, Bronchial Washings;  Surgeon: Wilber Lin MD;  [facility]     BRONCHOSCOPY, DILATE BRONCHUS, STENT BRONCHUS, COMBINED Right 7/10/2018    Procedure: COMBINED BRONCHOSCOPY, DILATE BRONCHUS, STENT BRONCHUS;  Flexible Bronchoscopy, Balloon Dilation, Bronchial Washings  ;  Surgeon: Wilber Lin MD;  [facility]     BRONCHOSCOPY, DILATE BRONCHUS, STENT BRONCHUS, COMBINED N/A 8/2/2018    Procedure: COMBINED BRONCHOSCOPY, DILATE BRONCHUS, STENT BRONCHUS;  Flexible Bronchoscopy, Bronchial Washings, Balloon Dilation;  Surgeon: Wilber Lin MD;  [facility]     BRONCHOSCOPY, DILATE BRONCHUS, STENT BRONCHUS, COMBINED N/A 8/20/2018    Procedure: COMBINED BRONCHOSCOPY, DILATE BRONCHUS, STENT BRONCHUS;  Flexible Bronchoscopy, Balloon Dilation;  Surgeon: Wilber Lin MD;  [facility]     BRONCHOSCOPY, DILATE BRONCHUS, STENT BRONCHUS, COMBINED N/A 10/29/2018    Procedure: Flexible Bronchoscopy, Balloon Dilation, Stent Revision, Airway Examination And Therapeutic Suctioning, Cyro Tumor Debulking;  Surgeon: Wilber Lin MD;  [facility]     BRONCHOSCOPY, DILATE BRONCHUS, STENT BRONCHUS, COMBINED N/A 11/20/2018    Procedure: Rigid Bronchoscopy, Stent Removal and dilitation;  Surgeon:  Wilber Lin MD;  [facility]     ENT SURGERY      tonsillectomy as a child     ESOPHAGOSCOPY, GASTROSCOPY, DUODENOSCOPY (EGD), COMBINED N/A 10/29/2018    Procedure: COMBINED ESOPHAGOSCOPY, GASTROSCOPY, DUODENOSCOPY (EGD) with biopsies and polypectomy;  Surgeon: Chente Bloom MD;  [facility]     INSERT EXTRACORPORAL MEMBRANE OXYGENATOR ADULT (OUTSIDE OR) N/A 2/27/2018    Procedure: INSERT EXTRACORPORAL MEMBRANE OXYGENATOR ADULT (OUTSIDE OR);  INSERT EXTRACORPORAL MEMBRANE OXYGENATOR ADULT (OUTSIDE OR) ;  Surgeon: Toby Hernandez MD;  [facility]     no prior surgery       REMOVE EXTRACORPORAL MEMBRANE OXYGENATOR ADULT N/A 3/3/2018    Procedure: REMOVE EXTRACORPORAL MEMBRANE OXYGENATOR ADULT;  Removal of Right Femoral Venous and Right Axillary Arterial Extracorporeal Membrane Oxygenator;  Surgeon: Toby Hernandez MD;  Location:  OR     TRANSPLANT LUNG RECIPIENT SINGLE X2 Bilateral 3/1/2018    Procedure: TRANSPLANT LUNG RECIPIENT SINGLE X2;  Median Sternotomy, Extracorporeal Membrane Oxygenator, bilateral sequential lung transplant;  Surgeon: Toby Hernandez MD;  [facility]       Social History     Socioeconomic History     Marital status:      Spouse name: Not on file     Number of children: Not on file     Years of education: Not on file     Highest education level: Not on file   Social Needs     Financial resource strain: Not on file     Food insecurity - worry: Not on file     Food insecurity - inability: Not on file     Transportation needs - medical: Not on file     Transportation needs - non-medical: Not on file   Occupational History     Not on file   Tobacco Use     Smoking status: Never Smoker     Smokeless tobacco: Never Used   Substance and Sexual Activity     Alcohol use: No     Alcohol/week: 0.6 oz     Types: 1 Glasses of wine per week     Drug use: No     Sexual activity: Not on file   Other Topics Concern      Parent/sibling w/ CABG, MI or angioplasty before 65F 55M? No   Social History Narrative     Not on file       Family History   Problem Relation Age of Onset     Hypertension Mother      Arthritis Mother      Pancreatic Cancer Father      Diabetes Father        Pulmonary ROS  Constitutional- Positive. Gained weight  Eyes- Negative  Ear, nose and throat- Positive. Rhinorrhea.  Cardiac- Negative  Pulm- See HPI  GI- Negative.  Genitourinary- Negative  Musculoskeletal- Positive. Left knee pain and inability to straighten the knees  Neurology- Negative  Dermatology- Negative  Endocrine- Negative  Lymphatic- Negative  Psychiatry- Negative  A complete ROS was otherwise negative except as noted in the HPI.      /70   Pulse 86   Temp 98.2  F (36.8  C) (Oral)   LMP 06/07/2014 (Exact Date)   SpO2 100%   Physical Exam  GENERAL APPEARANCE: Alert, Oriented x3. Not in distress.  EYES: PERRL, EOMI  HENT: Nasal mucosa with no hyperemia and no edema, no nasal polyps.  MOUTH: Oral mucosa is moist, without any lesions, no tonsillar enlargement, no oropharyngeal exudate.  NECK: Supple, no masses, no thyromegaly.  LYMPHATICS: No significant axillary, cervical, or supraclavicular nodes.  RESP: Normal percussion, good air flow throughout Left lung. Rt. lung insp wheeze heard and poor exhalation.  CV: Normal S1, S2, regular rhythm, normal rate, no rub, no murmur,  no gallop. Trace álvaro LE edema.  ABDOMEN: Bowel sounds normal, soft, nontender, no HSM or masses.  MS: Extremities normal, no clubbing, no cyanosis.  Álvaro knee effusions.  SKIN: No rash on limited exam.  NEURO: Mentation intact, speech normal, normal strength and tone, normal gait, and stance.  PSYCH: Mentation appears normal, and affect normal/bright.      Results  Recent Results (from the past 168 hour(s))   Tacrolimus level    Collection Time: 12/13/18  7:27 AM   Result Value Ref Range    Tacrolimus Last Dose LD 18:30     Tacrolimus Level 13.0 5.0 - 15.0 ug/L   INR     Collection Time: 12/13/18  7:27 AM   Result Value Ref Range    INR 1.02 0.86 - 1.14   CBC with platelets    Collection Time: 12/13/18  7:27 AM   Result Value Ref Range    WBC 1.8 (L) 4.0 - 11.0 10e9/L    RBC Count 2.08 (L) 3.8 - 5.2 10e12/L    Hemoglobin 6.5 (LL) 11.7 - 15.7 g/dL    Hematocrit 22.2 (L) 35.0 - 47.0 %     (H) 78 - 100 fl    MCH 31.3 26.5 - 33.0 pg    MCHC 29.3 (L) 31.5 - 36.5 g/dL    RDW 18.9 (H) 10.0 - 15.0 %    Platelet Count 674 (H) 150 - 450 10e9/L   Magnesium    Collection Time: 12/13/18  7:27 AM   Result Value Ref Range    Magnesium 1.6 1.6 - 2.3 mg/dL   Basic metabolic panel    Collection Time: 12/13/18  7:27 AM   Result Value Ref Range    Sodium 134 133 - 144 mmol/L    Potassium 5.3 3.4 - 5.3 mmol/L    Chloride 101 94 - 109 mmol/L    Carbon Dioxide 26 20 - 32 mmol/L    Anion Gap 8 3 - 14 mmol/L    Glucose 96 70 - 99 mg/dL    Urea Nitrogen 28 7 - 30 mg/dL    Creatinine 1.66 (H) 0.52 - 1.04 mg/dL    GFR Estimate 32 (L) >60 mL/min/1.7m2    GFR Estimate If Black 39 (L) >60 mL/min/1.7m2    Calcium 8.6 8.5 - 10.1 mg/dL   FERRITIN    Collection Time: 12/13/18 10:33 AM   Result Value Ref Range    Ferritin 302 (H) 8 - 252 ng/mL   IRON AND IRON BINDING CAPACITY    Collection Time: 12/13/18 10:33 AM   Result Value Ref Range    Iron 16 (L) 35 - 180 ug/dL    Iron Binding Cap 221 (L) 240 - 430 ug/dL    Iron Saturation Index 7 (L) 15 - 46 %   Reticulocyte Count    Collection Time: 12/13/18 10:33 AM   Result Value Ref Range    % Retic 4.6 (H) 0.5 - 2.0 %    Absolute Retic 94.2 25 - 95 10e9/L   CBC with platelets differential    Collection Time: 12/13/18 10:33 AM   Result Value Ref Range    WBC 1.6 (L) 4.0 - 11.0 10e9/L    RBC Count 2.07 (L) 3.8 - 5.2 10e12/L    Hemoglobin 6.6 (LL) 11.7 - 15.7 g/dL    Hematocrit 22.2 (L) 35.0 - 47.0 %     (H) 78 - 100 fl    MCH 31.9 26.5 - 33.0 pg    MCHC 29.7 (L) 31.5 - 36.5 g/dL    RDW 19.0 (H) 10.0 - 15.0 %    Platelet Count 773 (H) 150 - 450 10e9/L    Diff  Method Manual Differential     % Neutrophils 54.1 %    % Lymphocytes 15.3 %    % Monocytes 27.0 %    % Eosinophils 1.8 %    % Basophils 1.8 %    Absolute Neutrophil 0.9 (L) 1.6 - 8.3 10e9/L    Absolute Lymphocytes 0.2 (L) 0.8 - 5.3 10e9/L    Absolute Monocytes 0.4 0.0 - 1.3 10e9/L    Absolute Eosinophils 0.0 0.0 - 0.7 10e9/L    Absolute Basophils 0.0 0.0 - 0.2 10e9/L    Anisocytosis Moderate     Poikilocytosis Moderate     Teardrop Cells Slight     Ovalocytes Slight     Macrocytes Present     Hypochromasia Present     Platelet Estimate Confirming automated cell count    Lactate Dehydrogenase    Collection Time: 12/13/18 10:33 AM   Result Value Ref Range    Lactate Dehydrogenase 197 81 - 234 U/L   ABO/Rh type and screen    Collection Time: 12/13/18 10:33 AM   Result Value Ref Range    Units Ordered 1     ABO A     RH(D) Pos     Antibody Screen Neg     Test Valid Only At          [facility]    Specimen Expires 12/16/2018     Crossmatch Red Blood Cells    Blood component    Collection Time: 12/13/18 10:33 AM   Result Value Ref Range    Unit Number [id number]     Blood Component Type Red Blood Cells Leukocyte Reduced     Division Number 00     Status of Unit IN-TRANSIT     Blood Product Code [id number]     Unit Status          Results as noted above.    PFT Interpretation:  Severe obstructive ventilatory defect along with severe restriction.  FEV1 has improved by 440ml.  FEV1 remains below best of 1.27L.  Valid Maneuver    CXR (12/4/2018), personally reviewed by me: The Lung fields are clear. No effusion. Cardiac silhouette is WNL.      Assessment and Plan: Kecia Blue is a 57-year-old female with a history of dermatomyositis, seronegative rheumatoid arthritis, interstitial lung disease, and pulmonary hypertension who was admitted to the hospital with acute worsening of chronic hypoxic respiratory failure in 02/2018 and progressed [facility] for right [facility] failure and  subsequently underwent bilateral sequential lung transplant on 03/01/2018. Her post-transplant course was generally uncomplicated; however, she needed pleural effusion tap on the right side later. She has had significant narrowing of right bronchial anastomosis with significant granulation tissue blocking the mainstem.  She has required multiple dilations and stent placement.     # Bilateral Lung Transplant: She severe restriction with low PFT's. Etiology unclear - ?chest wall restriction. Possible diaphram weakness/paralysis. Deconditioning might be playing a role.  Current IS regimen:   - Tacrolimus with a goal of 10-15 nanograms/mL, AZA 150mg QHS and prednisone. Off MMF due to recurrent N/V    DSA positive with DQB7 previously. Last DSA checked was neg (11/19/18).    12/13/2018: We will stop AZA given leucopenia and worsening anemia. Restart AZA when WBC is > 3.5.    # Right Main Bronchial Stenosis S/P Dilatation: Granulation tissue at the Rt. mainstem opening - it was removed. Last Dilation with stent placement on 10/29/18  12/13/2018: Cont Saline nebs BID. Last FEV1 has improved but not back to baseline. Lung exam is good - not suggestive of stenosis.    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 On Valcyte 450mg/day. Previous bronchs have had a significantly elevated CMV level.   12/4/2018: Bronch washing/BAL CMV improving - last done on 11/20/18. CMV in plasma is low level based on external lab but was negative from our lab on 11/19/18. For now we will continue Valcyte untill both are negative. Last spike in plasma level on 7/20/18.  12/13/2018: Will decrease valcyte to three times a week.  H/o C. diff colitis: Finished a 10day course of Oral Vancomycin.  High risk donor: Will need labs in 12 months.    # Provoked Left  Upper Extremity Clot and Right IJ Clot (3/7/18): completed 6months of anticoag (Coumadin).    # Dermatomyositis with Raynaud's Phenomenon: Followed by rheumatology.  - Myositis panel is positive.    # Hoarse Voice: She does complain of some hoarseness and is scheduled to see ENT, has vocal cord polyp seen at there last bronch.    # CKD, Stage III: Cr is stable despite significant N/V/D.  12/13/2018: Cr is worse today. Etiology unclear. Will repeat in 3 - 4days. Has not received IVF in six days.   # Hypomagnesemia: The patient remains on magnesium replacement. Her magnesium level today is acceptable.    # Anemia: with hgb of 7.6 down from baseline 8-9s. No s/s of bleeding. Received 1 U PRBC during admission, suspect possible occult bleeding from C diff. Hgb is stable at 7.5. Iron panel, folate/B12 WNL.  12/13/2018: Low Hgb, will give 1UPRBC. Will start FeSO4 BID. Doubt blood loss, most likely also has BM suppression.  - Checking LDH/Haptoglobin and PBS.    # Leucopenia: Will stop AZA for now and follow CBC weekly. Interestingly has thrombocytosis.    # Nausea/Vomitting and Early Satiety: Worsened with the Bactrim. CT abd pelvis on 8/20/18 mildly dilated loops of SB.  On PPI BID.  EGD on 10/29/18 - multiple gastric polyps. Normal esophagus. Duodenal bx was consistent with MMF toxicity.  10/9/2018: She continues to have a poor appetite. On Reglan BID, the nausea has not been the primary limiting factor.   - Prescribed Marinol 5 mg twice a day.  12/13/2018: All resolved. Stool studies were negative. Seen by GI. If N/V recurs will proceed with EGD.    # HTN: Well controlled on Metoprolol 25mg BID.    # Bilateral Knee Pain: She has bilateral knee pain, which is due to arthritis, per patient. She received a Cortisone injection in left knee recently and this helped her left knee pain and edema. Right knee is painful and edematous today (10/9/2018).  12/13/2018: Will have her follow up with Sports medicine clinic. Has Álvaro  knee effusions with inability to straighten her knee.    # Health Care Maintenance: She has had influenza vaccination for 2018/2019 season.  12/4/2018: Will give TDaP Booster today and start Shingrix vaccination series next week      RTC in 4 weeks with labs including spirometry and xray.

## 2018-12-13 NOTE — PATIENT INSTRUCTIONS
It was a pleasure taking care of you today.  I've included a brief summary of our discussion and care plan from today's visit below.  Please review this information with your primary care provider.  ______________________________________________________________________    My recommendations are summarized as follows:    -- May take zofran as needed  -- May continue protonix 40 twice per day. Take this before any intake    Return to GI Clinic as needed to review your progress.    ______________________________________________________________________    Who do I call with any questions after my visit?  Please be in touch if there are any further questions that arise following today's visit.  There are multiple ways to contact your gastroenterology care team.        During business hours, you may reach a Gastroenterology nurse at [phone], option [phone].       To schedule or reschedule an appointment, please call [phone].       You can always send a secure message through One[employer].  One[employer] messages are answered by your nurse or doctor typically within 24 hours.  Please allow extra time on weekends and holidays.        For urgent/emergent questions after business hours, you may reach the on-call GI Fellow by contacting the [facility]  at [phone].      In order for your refill to be processed in a timely fashion, it is your responsibility to ensure you follow the recommendations from your provider regarding your laboratory studies and follow up appointments.       How will I get the results of any tests ordered?    You will receive all of your results.  If you have signed up for One[employer], any tests ordered at your visit will be available to you after your physician reviews them.  Typically this takes 1-2 weeks.  If there are urgent results that require a change in your care plan, your physician or nurse will call you to discuss the next steps.      What is MyChart?  MyChart is a secure way for  you to access all of your healthcare records from the [facility].  It is a web based computer program, so you can sign on to it from any location.  It also allows you to send secure messages to your care team.  I recommend signing up for REPUCOM access if you have not already done so and are comfortable with using a computer.      How to I schedule a follow-up visit?  If you did not schedule a follow-up visit today, please call [phone] to schedule a follow-up office visit.        Sincerely,    Daron Cruz PA-C  [facility]  Division of Gastroenterology

## 2018-12-13 NOTE — LETTER
12/13/2018       RE: Kecia Blue  [id number] [place] [address]     Dear Colleague,    Thank you for referring your patient, Kecia Blue, to the [facility] at [facility]. Please see a copy of my visit note below.    Reason for Visit  Kecia Blue is a 57-year-old female who is being seen for RECHECK (lung tx)      Lung TX HPI  Kecia Blue is a 57-year-old female with a history of dermatomyositis, seronegative rheumatoid arthritis, interstitial lung disease, and pulmonary hypertension who was admitted to the hospital with acute worsening of chronic hypoxic respiratory failure in 02/2018 and progressed [facility] for right heart failure and subsequently underwent bilateral sequential lung transplant on 03/01/2018.  Her post-transplant course was generally uncomplicated; however, she needed pleural effusion tap on the right side later.  She has had significant narrowing of right bronchial anastomosis with significant granulation tissue blocking the mainstem.  She has required multiple dilations and stent placement.       Interval HPI  Since last clinic visit about 8-10 days ago she has been feeling much better.  She is received 2 L of IV fluids at the last clinic visit and a liter daily for 2 days following that.  Since last Friday she has not received any fluids and her appetite has recovered has returned.  She does not have any nausea vomiting or loose stools.  She is eating fairly well and has been able to drink about 70 ounces of fluids every day.  Overall she is feeling much better.  She denies having any wheezing still has some shortness of breath with walking short distances.  She also notes cough which is improved but is still present.  Her cough is a bit more at nighttime.  But is able to fall asleep easily.  She denies any significant change in her pedal edema.    She denies any fevers chills or night  sweats.    She also denies having any melena or hematochezia.  Denies have any hematemesis.  No black colored stools.      Current Outpatient Medications   Medication     ACETAMINOPHEN PO     albuterol (PROAIR HFA/PROVENTIL HFA/VENTOLIN HFA) 108 (90 Base) MCG/ACT Inhaler     azaTHIOprine (IMURAN) 50 MG tablet     calcium-vitamin D (CALTRATE) 600-400 MG-UNIT per tablet     guaiFENesin-codeine (ROBITUSSIN AC) 100-10 MG/5ML SOLN solution     magnesium oxide (MAG-OX) 400 MG tablet     metoprolol tartrate (LOPRESSOR) 25 MG tablet     multivitamin, therapeutic with minerals (THERA-VIT-M) TABS tablet     ondansetron (ZOFRAN) 4 MG tablet     order for DME     order for DME     pantoprazole (PROTONIX) 40 MG EC tablet     pentoxifylline (TRENTAL) 400 MG CR tablet     predniSONE (DELTASONE) 2.5 MG tablet     predniSONE (DELTASONE) 5 MG tablet     sulfamethoxazole-trimethoprim (BACTRIM DS/SEPTRA DS) 800-160 MG per tablet     tacrolimus (GENERIC EQUIVALENT) 1 MG capsule     valGANciclovir (VALCYTE) 450 MG tablet     predniSONE (DELTASONE) 20 MG tablet     No current facility-administered medications for this visit.        No Known Allergies      Past Medical History:   Diagnosis Date     Antisynthetase syndrome (H) 2014     Chronic cough      Chronic infection     recent C diff  8/18     Dehydration 8/1/2018     Dermatomyositis (H)      Dysplasia of cervix, low grade (ESTRADA 1)      ILD (interstitial lung disease) (H)      Osteopenia      PONV (postoperative nausea and vomiting)      Pulmonary hypertension (H)      Raynaud's disease      Seronegative rheumatoid arthritis (H)        Past Surgical History:   Procedure Laterality Date     BRONCHOSCOPY (RIGID OR FLEXIBLE), DIAGNOSTIC N/A 4/10/2018    Procedure: COMBINED BRONCHOSCOPY (RIGID OR FLEXIBLE), LAVAGE;;  Surgeon: Mariposa Donohue MD;  Location: [facility]     BRONCHOSCOPY (RIGID OR FLEXIBLE), DILATE BRONCHUS / TRACHEA N/A 10/11/2018    Procedure: BRONCHOSCOPY (RIGID OR  FLEXIBLE), DILATE BRONCHUS / TRACHEA;  Flexible And Rigid Bronchoscopy and Dilation;  Surgeon: Wilber Lin MD;  [facility]     BRONCHOSCOPY FLEXIBLE N/A 3/13/2018    Procedure: BRONCHOSCOPY FLEXIBLE;  Flexible Bronchoscopy ;  Surgeon: Gissell Sanchez MD;  [facility]: [facility]     BRONCHOSCOPY FLEXIBLE N/A 5/9/2018    Procedure: BRONCHOSCOPY FLEXIBLE;;  Surgeon: Wilber Lin MD;  [facility]     BRONCHOSCOPY FLEXIBLE AND RIGID N/A 9/10/2018    Procedure: BRONCHOSCOPY FLEXIBLE AND RIGID;  Flexible and Rigid Bronchoscopy with Balloon Dilation, tissue debulking with cryo, and Right mainstem bronchus stent placement;  Surgeon: Wilber Lin MD;  [facility]     BRONCHOSCOPY RIGID N/A 6/6/2018    Procedure: BRONCHOSCOPY RIGID;;  Surgeon: Lopez Macias MD;  [facility]     BRONCHOSCOPY, DILATE BRONCHUS, STENT BRONCHUS, COMBINED N/A 6/11/2018    Procedure: COMBINED BRONCHOSCOPY, DILATE BRONCHUS, STENT BRONCHUS;  Flexible Bronchoscopy, Balloon Dilation, Bronchial Washings;  Surgeon: Wilber Lin MD;  [facility]     BRONCHOSCOPY, DILATE BRONCHUS, STENT BRONCHUS, COMBINED Right 7/10/2018    Procedure: COMBINED BRONCHOSCOPY, DILATE BRONCHUS, STENT BRONCHUS;  Flexible Bronchoscopy, Balloon Dilation, Bronchial Washings  ;  Surgeon: Wilber Lin MD;  [facility]     BRONCHOSCOPY, DILATE BRONCHUS, STENT BRONCHUS, COMBINED N/A 8/2/2018    Procedure: COMBINED BRONCHOSCOPY, DILATE BRONCHUS, STENT BRONCHUS;  Flexible Bronchoscopy, Bronchial Washings, Balloon Dilation;  Surgeon: Wilber Lin MD;  [facility]     BRONCHOSCOPY, DILATE BRONCHUS, STENT BRONCHUS, COMBINED N/A 8/20/2018    Procedure: COMBINED BRONCHOSCOPY, DILATE BRONCHUS, STENT BRONCHUS;  Flexible Bronchoscopy, Balloon Dilation;  Surgeon: Wilber Lin MD;  [facility]     BRONCHOSCOPY, DILATE BRONCHUS, STENT BRONCHUS, COMBINED N/A 10/29/2018    Procedure:  Flexible Bronchoscopy, Balloon Dilation, Stent Revision, Airway Examination And Therapeutic Suctioning, Cyro Tumor Debulking;  Surgeon: Wilber Lin MD;  [facility]     BRONCHOSCOPY, DILATE BRONCHUS, STENT BRONCHUS, COMBINED N/A 11/20/2018    Procedure: Rigid Bronchoscopy, Stent Removal and dilitation;  Surgeon: Wilber Lin MD;  [facility]     ENT SURGERY      tonsillectomy as a child     ESOPHAGOSCOPY, GASTROSCOPY, DUODENOSCOPY (EGD), COMBINED N/A 10/29/2018    Procedure: COMBINED ESOPHAGOSCOPY, GASTROSCOPY, DUODENOSCOPY (EGD) with biopsies and polypectomy;  Surgeon: Chente Bloom MD;  [facility]     INSERT EXTRACORPORAL MEMBRANE OXYGENATOR ADULT (OUTSIDE OR) N/A 2/27/2018    Procedure: INSERT EXTRACORPORAL MEMBRANE OXYGENATOR ADULT (OUTSIDE OR);  INSERT EXTRACORPORAL MEMBRANE OXYGENATOR ADULT (OUTSIDE OR) ;  Surgeon: Toby Hernandez MD;  Location:  OR     no prior surgery       REMOVE EXTRACORPORAL MEMBRANE OXYGENATOR ADULT N/A 3/3/2018    Procedure: REMOVE EXTRACORPORAL MEMBRANE OXYGENATOR ADULT;  Removal of Right Femoral Venous and Right Axillary Arterial Extracorporeal Membrane Oxygenator;  Surgeon: Toby Hernandez MD;  Location:  OR     TRANSPLANT LUNG RECIPIENT SINGLE X2 Bilateral 3/1/2018    Procedure: TRANSPLANT LUNG RECIPIENT SINGLE X2;  Median Sternotomy, Extracorporeal Membrane Oxygenator, bilateral sequential lung transplant;  Surgeon: Toby Hernandez MD;  Location:  OR       Social History     Socioeconomic History     Marital status:      Spouse name: Not on file     Number of children: Not on file     Years of education: Not on file     Highest education level: Not on file   Social Needs     Financial resource strain: Not on file     Food insecurity - worry: Not on file     Food insecurity - inability: Not on file     Transportation needs - medical: Not on file     Transportation needs - non-medical: Not on  file   Occupational History     Not on file   Tobacco Use     Smoking status: Never Smoker     Smokeless tobacco: Never Used   Substance and Sexual Activity     Alcohol use: No     Alcohol/week: 0.6 oz     Types: 1 Glasses of wine per week     Drug use: No     Sexual activity: Not on file   Other Topics Concern     Parent/sibling w/ CABG, MI or angioplasty before 65F 55M? No   Social History Narrative     Not on file       Family History   Problem Relation Age of Onset     Hypertension Mother      Arthritis Mother      Pancreatic Cancer Father      Diabetes Father        Pulmonary ROS  Constitutional- Positive. Gained weight  Eyes- Negative  Ear, nose and throat- Positive. Rhinorrhea.  Cardiac- Negative  Pulm- See HPI  GI- Negative.  Genitourinary- Negative  Musculoskeletal- Positive. Left knee pain and inability to straighten the knees  Neurology- Negative  Dermatology- Negative  Endocrine- Negative  Lymphatic- Negative  Psychiatry- Negative  A complete ROS was otherwise negative except as noted in the HPI.      /70   Pulse 86   Temp 98.2  F (36.8  C) (Oral)   LMP 06/07/2014 (Exact Date)   SpO2 100%   Physical Exam  GENERAL APPEARANCE: Alert, Oriented x3. Not in distress.  EYES: PERRL, EOMI  HENT: Nasal mucosa with no hyperemia and no edema, no nasal polyps.  MOUTH: Oral mucosa is moist, without any lesions, no tonsillar enlargement, no oropharyngeal exudate.  NECK: Supple, no masses, no thyromegaly.  LYMPHATICS: No significant axillary, cervical, or supraclavicular nodes.  RESP: Normal percussion, good air flow throughout Left lung. Rt. lung insp wheeze heard and poor exhalation.  CV: Normal S1, S2, regular rhythm, normal rate, no rub, no murmur,  no gallop. Trace álvaro LE edema.  ABDOMEN: Bowel sounds normal, soft, nontender, no HSM or masses.  MS: Extremities normal, no clubbing, no cyanosis.  Álvaro knee effusions.  SKIN: No rash on limited exam.  NEURO: Mentation intact, speech normal, normal strength and  tone, normal gait, and stance.  PSYCH: Mentation appears normal, and affect normal/bright.      Results  Recent Results (from the past 168 hour(s))   Tacrolimus level    Collection Time: 12/13/18  7:27 AM   Result Value Ref Range    Tacrolimus Last Dose LD 18:30     Tacrolimus Level 13.0 5.0 - 15.0 ug/L   INR    Collection Time: 12/13/18  7:27 AM   Result Value Ref Range    INR 1.02 0.86 - 1.14   CBC with platelets    Collection Time: 12/13/18  7:27 AM   Result Value Ref Range    WBC 1.8 (L) 4.0 - 11.0 10e9/L    RBC Count 2.08 (L) 3.8 - 5.2 10e12/L    Hemoglobin 6.5 (LL) 11.7 - 15.7 g/dL    Hematocrit 22.2 (L) 35.0 - 47.0 %     (H) 78 - 100 fl    MCH 31.3 26.5 - 33.0 pg    MCHC 29.3 (L) 31.5 - 36.5 g/dL    RDW 18.9 (H) 10.0 - 15.0 %    Platelet Count 674 (H) 150 - 450 10e9/L   Magnesium    Collection Time: 12/13/18  7:27 AM   Result Value Ref Range    Magnesium 1.6 1.6 - 2.3 mg/dL   Basic metabolic panel    Collection Time: 12/13/18  7:27 AM   Result Value Ref Range    Sodium 134 133 - 144 mmol/L    Potassium 5.3 3.4 - 5.3 mmol/L    Chloride 101 94 - 109 mmol/L    Carbon Dioxide 26 20 - 32 mmol/L    Anion Gap 8 3 - 14 mmol/L    Glucose 96 70 - 99 mg/dL    Urea Nitrogen 28 7 - 30 mg/dL    Creatinine 1.66 (H) 0.52 - 1.04 mg/dL    GFR Estimate 32 (L) >60 mL/min/1.7m2    GFR Estimate If Black 39 (L) >60 mL/min/1.7m2    Calcium 8.6 8.5 - 10.1 mg/dL   FERRITIN    Collection Time: 12/13/18 10:33 AM   Result Value Ref Range    Ferritin 302 (H) 8 - 252 ng/mL   IRON AND IRON BINDING CAPACITY    Collection Time: 12/13/18 10:33 AM   Result Value Ref Range    Iron 16 (L) 35 - 180 ug/dL    Iron Binding Cap 221 (L) 240 - 430 ug/dL    Iron Saturation Index 7 (L) 15 - 46 %   Reticulocyte Count    Collection Time: 12/13/18 10:33 AM   Result Value Ref Range    % Retic 4.6 (H) 0.5 - 2.0 %    Absolute Retic 94.2 25 - 95 10e9/L   CBC with platelets differential    Collection Time: 12/13/18 10:33 AM   Result Value Ref Range    WBC  1.6 (L) 4.0 - 11.0 10e9/L    RBC Count 2.07 (L) 3.8 - 5.2 10e12/L    Hemoglobin 6.6 (LL) 11.7 - 15.7 g/dL    Hematocrit 22.2 (L) 35.0 - 47.0 %     (H) 78 - 100 fl    MCH 31.9 26.5 - 33.0 pg    MCHC 29.7 (L) 31.5 - 36.5 g/dL    RDW 19.0 (H) 10.0 - 15.0 %    Platelet Count 773 (H) 150 - 450 10e9/L    Diff Method Manual Differential     % Neutrophils 54.1 %    % Lymphocytes 15.3 %    % Monocytes 27.0 %    % Eosinophils 1.8 %    % Basophils 1.8 %    Absolute Neutrophil 0.9 (L) 1.6 - 8.3 10e9/L    Absolute Lymphocytes 0.2 (L) 0.8 - 5.3 10e9/L    Absolute Monocytes 0.4 0.0 - 1.3 10e9/L    Absolute Eosinophils 0.0 0.0 - 0.7 10e9/L    Absolute Basophils 0.0 0.0 - 0.2 10e9/L    Anisocytosis Moderate     Poikilocytosis Moderate     Teardrop Cells Slight     Ovalocytes Slight     Macrocytes Present     Hypochromasia Present     Platelet Estimate Confirming automated cell count    Lactate Dehydrogenase    Collection Time: 12/13/18 10:33 AM   Result Value Ref Range    Lactate Dehydrogenase 197 81 - 234 U/L   ABO/Rh type and screen    Collection Time: 12/13/18 10:33 AM   Result Value Ref Range    Units Ordered 1     ABO A     RH(D) Pos     Antibody Screen Neg     Test Valid Only At          [facility]    Specimen Expires 12/16/2018     Crossmatch Red Blood Cells    Blood component    Collection Time: 12/13/18 10:33 AM   Result Value Ref Range    Unit Number [id number]     Blood Component Type Red Blood Cells Leukocyte Reduced     Division Number 00     Status of Unit IN-TRANSIT     Blood Product Code [id number]     Unit Status          Results as noted above.    PFT Interpretation:  Severe obstructive ventilatory defect along with severe restriction.  FEV1 has improved by 440ml.  FEV1 remains below best of 1.27L.  Valid Maneuver    CXR (12/4/2018), personally reviewed by me: The Lung fields are clear. No effusion. Cardiac silhouette is WNL.      Assessment and Plan: Kecia  Mirza is a 57-year-old female with a history of dermatomyositis, seronegative rheumatoid arthritis, interstitial lung disease, and pulmonary hypertension who was admitted to the hospital with acute worsening of chronic hypoxic respiratory failure in 02/2018 and progressed [facility] for right heart failure and subsequently underwent bilateral sequential lung transplant on 03/01/2018. Her post-transplant course was generally uncomplicated; however, she needed pleural effusion tap on the right side later. She has had significant narrowing of right bronchial anastomosis with significant granulation tissue blocking the mainstem.  She has required multiple dilations and stent placement.     # Bilateral Lung Transplant: She severe restriction with low PFT's. Etiology unclear - ?chest wall restriction. Possible diaphram weakness/paralysis. Deconditioning might be playing a role.  Current IS regimen:   - Tacrolimus with a goal of 10-15 nanograms/mL, AZA 150mg QHS and prednisone. Off MMF due to recurrent N/V    DSA positive with DQB7 previously. Last DSA checked was neg (11/19/18).    12/13/2018: We will stop AZA given leucopenia and worsening anemia. Restart AZA when WBC is > 3.5.    # Right Main Bronchial Stenosis S/P Dilatation: Granulation tissue at the Rt. mainstem opening - it was removed. Last Dilation with stent placement on 10/29/18  12/13/2018: Cont Saline nebs BID. Last FEV1 has improved but not back to baseline. Lung exam is good - not suggestive of stenosis.    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 On Valcyte 450mg/day. Previous bronchs have had a significantly elevated CMV level.   12/4/2018: Bronch washing/BAL CMV improving - last done on 11/20/18. CMV in plasma is low level based on external lab but was negative  from our lab on 11/19/18. For now we will continue Valcyte untill both are negative. Last spike in plasma level on 7/20/18.  12/13/2018: Will decrease valcyte to three times a week.  H/o C. diff colitis: Finished a 10day course of Oral Vancomycin.  High risk donor: Will need labs in 12 months.    # Provoked Left Upper Extremity Clot and Right IJ Clot (3/7/18): completed 6months of anticoag (Coumadin).    # Dermatomyositis with Raynaud's Phenomenon: Followed by rheumatology.  - Myositis panel is positive.    # Hoarse Voice: She does complain of some hoarseness and is scheduled to see ENT, has vocal cord polyp seen at there last bronch.    # CKD, Stage III: Cr is stable despite significant N/V/D.  12/13/2018: Cr is worse today. Etiology unclear. Will repeat in 3 - 4days. Has not received IVF in six days.   # Hypomagnesemia: The patient remains on magnesium replacement. Her magnesium level today is acceptable.    # Anemia: with hgb of 7.6 down from baseline 8-9s. No s/s of bleeding. Received 1 U PRBC during admission, suspect possible occult bleeding from C diff. Hgb is stable at 7.5. Iron panel, folate/B12 WNL.  12/13/2018: Low Hgb, will give 1UPRBC. Will start FeSO4 BID. Doubt blood loss, most likely also has BM suppression.  - Checking LDH/Haptoglobin and PBS.    # Leucopenia: Will stop AZA for now and follow CBC weekly. Interestingly has thrombocytosis.    # Nausea/Vomitting and Early Satiety: Worsened with the Bactrim. CT abd pelvis on 8/20/18 mildly dilated loops of SB.  On PPI BID.  EGD on 10/29/18 - multiple gastric polyps. Normal esophagus. Duodenal bx was consistent with MMF toxicity.  10/9/2018: She continues to have a poor appetite. On Reglan BID, the nausea has not been the primary limiting factor.   - Prescribed Marinol 5 mg twice a day.  12/13/2018: All resolved. Stool studies were negative. Seen by GI. If N/V recurs will proceed with EGD.    # HTN: Well controlled on Metoprolol 25mg BID.    # Bilateral  Knee Pain: She has bilateral knee pain, which is due to arthritis, per patient. She received a Cortisone injection in left knee recently and this helped her left knee pain and edema. Right knee is painful and edematous today (10/9/2018).  12/13/2018: Will have her follow up with Sports medicine clinic. Has Álvaro knee effusions with inability to straighten her knee.    # Health Care Maintenance: She has had influenza vaccination for 2018/2019 season.  12/4/2018: Will give TDaP Booster today and start Shingrix vaccination series next week      RTC in 4 weeks with labs including spirometry and xray.      Again, thank you for allowing me to participate in the care of your patient.      Sincerely,    Tarik Christensen MD

## 2018-12-13 NOTE — PROGRESS NOTES
GI CLINIC VISIT    CC/REFERRING MD:  Ame Chow  REASON FOR CONSULTATION: Nausea, vomiting, loose stools    ASSESSMENT/PLAN:  58-year-old female with history of dermatomyositis, seronegative rheumatoid arthritis, interstitial lung disease and pulmonary hypertension status post lung transplant March 2018 who presents to the GI clinic for consultation regarding nausea and vomiting and early satiety.    1. Nausea: This seemed to be specific to medication triggers to include high doses of antibiotics and coming off of MMF and moving towards an alternative for immunosuppression medications.  Nausea has since resolved and she has been able to gain her appetite back, allowing her to gain weight.  If this were to return, a repeat upper endoscopy may be indicated.  May use Zofran as needed for now.    2.  Loose stools: This is also largely resolved.  Again likely tied to medications such as her immunosuppression and chronic antibiotics.  Again this is has resolved.  Consideration for fiber supplementation if the symptoms do return.    3. Colorectal cancer screening: No personal or family history of colon cancer.  Patient reports a colonoscopy at the age of 52 that was normal, performed in [facility].  We do not have formal records of this report.  If normal, recommendation would be for colonoscopy in 10 years, 2022 unless symptomatic sooner.    4.  Anemia: Hemoglobin drawn today by patient's transplant team showed hemoglobin of 6.5.  Transplant team to determine indication for transfusion.  If there is concern for a GI source, would recommend an upper and lower endoscopy for further evaluation.  Given patient is largely asymptomatic at this time with no overt signs of bleeding, will hold off on additional GI investigation for source of bleeding.    RTC PRN    Thank you for this consultation.  It was a pleasure to participate in the care of this patient; please contact us with any further questions.        Daron Cruz PA-C  Division of Gastroenterology, Hepatology and Nutrition  [facility]      HPI  58-year-old female with history of dermatomyositis, seronegative rheumatoid arthritis, interstitial lung disease and pulmonary hypertension status post lung transplant March 2018 who presents to the GI clinic for consultation regarding nausea and vomiting and early satiety.    Patient's GI symptoms have been long-standing after her transplant and waxed and waned in severity.  She had been experiencing nausea and vomiting that have been increased with high doses of Bactrim.  This dose was decreased which helped symptoms.  She also completed an upper endoscopy on 10/29/2018 showing multiple gastric polyps, normal esophagus and duodenal biopsy was consistent with MMF toxicity.  MMF was discontinued.  She had also been on Reglan and Marinol to help with symptoms and has discontinued both at this time.    Beginning of December 2018 she had been off of MMF still with recurrent vomiting.  She had some relief with Zofran but was still experiencing nausea and early satiety.  She also was having loose bowel movements, which prompted stool studies to be performed as she has had a history of C. difficile.  The studies were negative for C. difficile infection.  CMV titers were obtained and felt to be negative.  After a clinic visit, she received IV fluids supplementation with improvement in symptoms.    She presents today largely asymptomatic.  She takes Zofran a couple times a week but this is significantly decreased.  She is not experiencing any nausea, vomiting or loose stools.  She has 1 bowel movement per day without blood.  Due to an appetite increase, she has been able to gain 11 pounds since the beginning of the month.  She denies any heartburn or dysphasia but takes Protonix 40 mg twice a day.  She continues on 7.5 mg of prednisone daily.    Just prior to my clinic visit today, I was notified of a low  hemoglobin, 6.5.  We were in prompt communication with her transplant team to arrange management of her anemia.  She denied any overt signs of bleeding, has not had any blood in her stool, hematemesis or symptoms of anemia.  She denies any lightheadedness or dizziness.  She denies any syncopal episodes.    ROS:    No fevers or chills  No weight loss  No blurry vision, double vision or change in vision  No sore throat  No lymphadenopathy  No headache, paraesthesias, or weakness in a limb  + shortness of breath or wheezing (during exertion)  No chest pain or pressure  No arthralgias or myalgias  No rashes or skin changes  No odynophagia or dysphagia  No BRBPR, hematochezia, melena  No dysuria, frequency or urgency  No hot/cold intolerance or polyria  No anxiety or depression    PROBLEM LIST  Patient Active Problem List    Diagnosis Date Noted     Nausea and vomiting 12/04/2018     Priority: Medium     Dehydration 08/01/2018     Priority: Medium     Acute kidney injury (H) 08/01/2018     Priority: Medium     CMV (cytomegalovirus) (H) 08/01/2018     Priority: Medium     Encounter for long-term (current) use of high-risk medication 04/03/2018     Priority: Medium     Deep vein thrombosis (DVT) (H) [I82.409] 03/30/2018     Priority: Medium     Steroid-induced hyperglycemia 03/11/2018     Priority: Medium     Lung transplant recipient (H) 03/01/2018     Priority: Medium     (Note: This summary should only be edited by a member of the lung transplant team. Thanks!)      Transplant providers, see [facility] [place] for additional detailed information      Transplant Hospitalization Summary:  Bilateral  Lung Transplant       Involved in a trial? (ex. INSPIRE, EXPAND):     Notable Intra-Operative Information:    Notes here if pertinent       DONOR Information:    CDC Increased Risk: Y/N?      PATIENT Information:  Serologies:     Donor Recipient Intervention   CMV status      EBV status      HSV status        Transplant  Complications:   PRE-op (Y/N) POST-op (Y/N)    Trach      Vent support      Chest tube  n/a    ECMO   Decannulation date: &&&     Primary Graft Dysfunction Documentation:   POD#1   (0-24 hours) POD#2   (25-48 hours) POD#3   (49-72 hours)   Date of data      Time of data      Intubated? Y/N Y/N Y/N   PaO2      FiO2      P/F Ratio      PGD Grade   (0=mild, 3=severe)      ECMO? Y/N Y/N Y/N   Inhaled NO? Y/N Y/N Y/N   ISHLT PGD Scoring  Grade 0 - PaO2/FiO2 >300 and normal chest radiograph (must be extubated to be Grade 0)  Grade 1 - PaO2/FiO2 >300 and diffuse allograft infiltrates on chest radiograph  Grade 2 - PaO2/FiO2 between 200 and 300  Grade 3 - PaO2/FiO2 <200    Post-Op Data:  Complication Y/N Date Comments   Date of Extubation(s) N/A     Return to OR?      Reintubated?      Date Last Chest Tube Removed N/A     Rejection?      Afib?      Renal failure?      HCAP?      DVT/PE?      Native lung pathology results        Prophylaxis:  1) Bactrim  2) Valcyte    Prednisone Taper Plan:    3/15/18 12 12   3/22/18 12 10   4/26/18 10 10   5/24/18 10 7.5   6/22/18 7.5 7.5   7/20/18 7.5 5   8/24/18 5 5   9/21/18 5 2.5             Discharge:  Discharge to: &&&Home / TCU / ARU  Discharge date: &&&       ILD (interstitial lung disease) (H) 02/27/2018     Priority: Medium     Acute on chronic respiratory failure with hypoxia (H) 02/21/2018     Priority: Medium       PERTINENT PAST MEDICAL HISTORY:  Past Medical History:   Diagnosis Date     Antisynthetase syndrome (H) 2014     Chronic cough      Chronic infection     recent C diff  8/18     Dehydration 8/1/2018     Dermatomyositis (H)      Dysplasia of cervix, low grade (ESTRADA 1)      ILD (interstitial lung disease) (H)      Osteopenia      PONV (postoperative nausea and vomiting)      Pulmonary hypertension (H)      Raynaud's disease      Seronegative rheumatoid arthritis (H)        PREVIOUS SURGERIES:  Past Surgical History:   Procedure Laterality Date     BRONCHOSCOPY (RIGID OR  FLEXIBLE), DIAGNOSTIC N/A 4/10/2018    Procedure: COMBINED BRONCHOSCOPY (RIGID OR FLEXIBLE), LAVAGE;;  Surgeon: Mariposa Donohue MD;  Location: [facility]     BRONCHOSCOPY (RIGID OR FLEXIBLE), DILATE BRONCHUS / TRACHEA N/A 10/11/2018    Procedure: BRONCHOSCOPY (RIGID OR FLEXIBLE), DILATE BRONCHUS / TRACHEA;  Flexible And Rigid Bronchoscopy and Dilation;  Surgeon: Wilber Lin MD;  Location: [facility]     BRONCHOSCOPY FLEXIBLE N/A 3/13/2018    Procedure: BRONCHOSCOPY FLEXIBLE;  Flexible Bronchoscopy ;  Surgeon: Gissell Sanchez MD;  Location: [facility]     BRONCHOSCOPY FLEXIBLE N/A 5/9/2018    Procedure: BRONCHOSCOPY FLEXIBLE;;  Surgeon: Wilber Lin MD;  Location: [facility]     BRONCHOSCOPY FLEXIBLE AND RIGID N/A 9/10/2018    Procedure: BRONCHOSCOPY FLEXIBLE AND RIGID;  Flexible and Rigid Bronchoscopy with Balloon Dilation, tissue debulking with cryo, and Right mainstem bronchus stent placement;  Surgeon: Wilber Lin MD;  [facility]     BRONCHOSCOPY RIGID N/A 6/6/2018    Procedure: BRONCHOSCOPY RIGID;;  Surgeon: Lopez Macias MD;  Location: [facility]     BRONCHOSCOPY, DILATE BRONCHUS, STENT BRONCHUS, COMBINED N/A 6/11/2018    Procedure: COMBINED BRONCHOSCOPY, DILATE BRONCHUS, STENT BRONCHUS;  Flexible Bronchoscopy, Balloon Dilation, Bronchial Washings;  Surgeon: Wilber Lin MD;  [facility]     BRONCHOSCOPY, DILATE BRONCHUS, STENT BRONCHUS, COMBINED Right 7/10/2018    Procedure: COMBINED BRONCHOSCOPY, DILATE BRONCHUS, STENT BRONCHUS;  Flexible Bronchoscopy, Balloon Dilation, Bronchial Washings  ;  Surgeon: Wilber Lin MD;  [facility]     BRONCHOSCOPY, DILATE BRONCHUS, STENT BRONCHUS, COMBINED N/A 8/2/2018    Procedure: COMBINED BRONCHOSCOPY, DILATE BRONCHUS, STENT BRONCHUS;  Flexible Bronchoscopy, Bronchial Washings, Balloon Dilation;  Surgeon: Wilber Lin MD;  [facility]     BRONCHOSCOPY, DILATE BRONCHUS, STENT BRONCHUS, COMBINED N/A  8/20/2018    Procedure: COMBINED BRONCHOSCOPY, DILATE BRONCHUS, STENT BRONCHUS;  Flexible Bronchoscopy, Balloon Dilation;  Surgeon: Wilber Lin MD;  [facility]     BRONCHOSCOPY, DILATE BRONCHUS, STENT BRONCHUS, COMBINED N/A 10/29/2018    Procedure: Flexible Bronchoscopy, Balloon Dilation, Stent Revision, Airway Examination And Therapeutic Suctioning, Cyro Tumor Debulking;  Surgeon: Wilber Lin MD;  [facility]     BRONCHOSCOPY, DILATE BRONCHUS, STENT BRONCHUS, COMBINED N/A 11/20/2018    Procedure: Rigid Bronchoscopy, Stent Removal and dilitation;  Surgeon: Wilber Lin MD;  Location: [facility]     ENT SURGERY      tonsillectomy as a child     ESOPHAGOSCOPY, GASTROSCOPY, DUODENOSCOPY (EGD), COMBINED N/A 10/29/2018    Procedure: COMBINED ESOPHAGOSCOPY, GASTROSCOPY, DUODENOSCOPY (EGD) with biopsies and polypectomy;  Surgeon: Chente Bloom MD;  [facility]     INSERT EXTRACORPORAL MEMBRANE OXYGENATOR ADULT (OUTSIDE OR) N/A 2/27/2018    Procedure: INSERT EXTRACORPORAL MEMBRANE OXYGENATOR ADULT (OUTSIDE OR);  INSERT EXTRACORPORAL MEMBRANE OXYGENATOR ADULT (OUTSIDE OR) ;  Surgeon: Toby Hernandez MD;  Location: [facility]     no prior surgery       REMOVE EXTRACORPORAL MEMBRANE OXYGENATOR ADULT N/A 3/3/2018    Procedure: REMOVE EXTRACORPORAL MEMBRANE OXYGENATOR ADULT;  Removal of Right Femoral Venous and Right Axillary Arterial Extracorporeal Membrane Oxygenator;  Surgeon: Toby Hernandez MD;  Location[facility]     TRANSPLANT LUNG RECIPIENT SINGLE X2 Bilateral 3/1/2018    Procedure: TRANSPLANT LUNG RECIPIENT SINGLE X2;  Median Sternotomy, Extracorporeal Membrane Oxygenator, bilateral sequential lung transplant;  Surgeon: Toby Hernandez MD;  Location[facility]     ALLERGIES:   No Known Allergies    PERTINENT MEDICATIONS:    Current Outpatient Medications:      ACETAMINOPHEN PO, Take 1,000 mg by mouth every 6 hours as needed for pain, Disp: , Rfl:       albuterol (PROAIR HFA/PROVENTIL HFA/VENTOLIN HFA) 108 (90 Base) MCG/ACT Inhaler, Inhale 2 puffs into the lungs every 6 hours as needed for shortness of breath / dyspnea or wheezing, Disp: 1 Inhaler, Rfl: 1     azaTHIOprine (IMURAN) 50 MG tablet, Take 3 tablets (150 mg) by mouth every evening, Disp: 90 tablet, Rfl: 11     calcium-vitamin D (CALTRATE) 600-400 MG-UNIT per tablet, Take 1 tablet by mouth daily, Disp: , Rfl:      guaiFENesin-codeine (ROBITUSSIN AC) 100-10 MG/5ML SOLN solution, Take 5-10 mLs by mouth every 6 hours as needed for cough, Disp: 120 mL, Rfl: 0     magnesium oxide (MAG-OX) 400 MG tablet, Take 1 tablet (400 mg) by mouth daily, Disp: 90 tablet, Rfl: 3     metoprolol tartrate (LOPRESSOR) 25 MG tablet, Take 1 tablet (25 mg) by mouth 2 times daily, Disp: 60 tablet, Rfl: 11     multivitamin, therapeutic with minerals (THERA-VIT-M) TABS tablet, Take 1 tablet by mouth daily, Disp: 30 each, Rfl: 11     ondansetron (ZOFRAN) 4 MG tablet, Take 1 tablet (4 mg) by mouth every 12 hours as needed for nausea, Disp: 60 tablet, Rfl: 1     order for DME, Equipment being ordered: Nebulizer, Disp: 1 Device, Rfl: 1     order for DME, Equipment being ordered: Nebulizer, Disp: 1 Device, Rfl: 1     pantoprazole (PROTONIX) 40 MG EC tablet, Take 1 tablet (40 mg) by mouth 2 times daily, Disp: 60 tablet, Rfl: 3     pentoxifylline (TRENTAL) 400 MG CR tablet, Take 1 tablet (400 mg) by mouth 2 times daily, Disp: 60 tablet, Rfl: 11     predniSONE (DELTASONE) 2.5 MG tablet, Take 1 tablet (2.5 mg) by mouth daily 6/22/18             7.5                    7.5 7/20/18             7.5                    5 8/24/18             5                       5 9/21/18             5                       2.5 (Patient taking differently: Take 2.5 mg by mouth At Bedtime 6/22/18             7.5                    7.5 7/20/18             7.5                    5 8/24/18             5                       5 9/21/18             5                        2.5), Disp: 90 tablet, Rfl: 1     predniSONE (DELTASONE) 20 MG tablet, Take 1 tablet (20 mg) by mouth daily (Patient not taking: Reported on 12/4/2018), Disp: 5 tablet, Rfl: 0     predniSONE (DELTASONE) 5 MG tablet, Follow taper card, Disp: 180 tablet, Rfl: 3     sulfamethoxazole-trimethoprim (BACTRIM DS/SEPTRA DS) 800-160 MG per tablet, Take 1 tablet by mouth daily X one month., Disp: 30 tablet, Rfl: 1     tacrolimus (GENERIC EQUIVALENT) 1 MG capsule, Total dose: 5 mg in the AM and 4 mg in the PM, Disp: 240 capsule, Rfl: 11     valGANciclovir (VALCYTE) 450 MG tablet, Take 450 mg by mouth daily, Disp: 30 tablet, Rfl: 6    SOCIAL HISTORY:  Social History     Socioeconomic History     Marital status:      Spouse name: Not on file     Number of children: Not on file     Years of education: Not on file     Highest education level: Not on file   Social Needs     Financial resource strain: Not on file     Food insecurity - worry: Not on file     Food insecurity - inability: Not on file     Transportation needs - medical: Not on file     Transportation needs - non-medical: Not on file   Occupational History     Not on file   Tobacco Use     Smoking status: Never Smoker     Smokeless tobacco: Never Used   Substance and Sexual Activity     Alcohol use: No     Alcohol/week: 0.6 oz     Types: 1 Glasses of wine per week     Drug use: No     Sexual activity: Not on file   Other Topics Concern     Parent/sibling w/ CABG, MI or angioplasty before 65F 55M? No   Social History Narrative     Not on file       FAMILY HISTORY:  FH of CRC: None  FH of IBD: None  Family History   Problem Relation Age of Onset     Hypertension Mother      Arthritis Mother      Pancreatic Cancer Father      Diabetes Father        Past/family/social history reviewed and no changes    PHYSICAL EXAMINATION:  Constitutional: aaox3, cooperative, pleasant, not dyspneic/diaphoretic, no acute distress  Vitals reviewed: LMP 06/07/2014 (Exact Date)    Wt:   Wt Readings from Last 2 Encounters:   12/04/18 45.7 kg (100 lb 11.2 oz)   11/20/18 50 kg (110 lb 3.7 oz)      Eyes: Sclera anicteric/injected  Ears/nose/mouth/throat: Normal oropharynx without ulcers or exudate, mucus membranes moist, hearing intact  Neck: supple, thyroid normal size  CV: No edema  Respiratory: Unlabored breathing  Lymph: No axillary, submandibular, supraclavicular or inguinal lymphadenopathy  Abd: Nondistended, +bs, no hepatosplenomegaly, nontender, no peritoneal signs  Skin: warm, perfused, no jaundice  Psych: Normal affect  MSK: Normal gait      PERTINENT STUDIES:    Orders Only on 12/13/2018   Component Date Value Ref Range Status     INR 12/13/2018 1.02  0.86 - 1.14 Final     Magnesium 12/13/2018 1.6  1.6 - 2.3 mg/dL Final     Sodium 12/13/2018 134  133 - 144 mmol/L Final     Potassium 12/13/2018 5.3  3.4 - 5.3 mmol/L Final     Chloride 12/13/2018 101  94 - 109 mmol/L Final     Carbon Dioxide 12/13/2018 26  20 - 32 mmol/L Final     Anion Gap 12/13/2018 8  3 - 14 mmol/L Final     Glucose 12/13/2018 96  70 - 99 mg/dL Final     Urea Nitrogen 12/13/2018 28  7 - 30 mg/dL Final     Creatinine 12/13/2018 1.66* 0.52 - 1.04 mg/dL Final     GFR Estimate 12/13/2018 32* >60 mL/min/1.7m2 Final     GFR Estimate If Black 12/13/2018 39* >60 mL/min/1.7m2 Final     Calcium 12/13/2018 8.6  8.5 - 10.1 mg/dL Final         Answers for HPI/ROS submitted by the patient on 12/13/2018   General Symptoms: Yes  Skin Symptoms: No  HENT Symptoms: No  EYE SYMPTOMS: Yes  HEART SYMPTOMS: No  LUNG SYMPTOMS: Yes  INTESTINAL SYMPTOMS: No  URINARY SYMPTOMS: No  GYNECOLOGIC SYMPTOMS: No  BREAST SYMPTOMS: No  SKELETAL SYMPTOMS: Yes  BLOOD SYMPTOMS: No  NERVOUS SYSTEM SYMPTOMS: Yes  MENTAL HEALTH SYMPTOMS: No  Fever: No  Loss of appetite: Yes  Weight loss: Yes  Weight gain: No  Fatigue: Yes  Night sweats: No  Chills: No  Increased stress: No  Excessive hunger: No  Excessive thirst: No  Feeling hot or cold when others believe  the temperature is normal: Yes  Loss of height: No  Post-operative complications: No  Surgical site pain: No  Hallucinations: No  Change in or Loss of Energy: No  Hyperactivity: No  Confusion: No  Eye pain: No  Vision loss: Yes  Dry eyes: No  Watery eyes: No  Eye bulging: No  Double vision: No  Flashing of lights: No  Spots: No  Floaters: No  Redness: No  Crossed eyes: No  Tunnel Vision: No  Yellowing of eyes: No  Eye irritation: No  Cough: Yes  Sputum or phlegm: Yes  Coughing up blood: No  Difficulty breating or shortness of breath: No  Snoring: No  Wheezing: Yes  Difficulty breathing on exertion: Yes  Nighttime Cough: Yes  Difficulty breathing when lying flat: No  Back pain: No  Muscle aches: Yes  Neck pain: No  Swollen joints: Yes  Joint pain: Yes  Bone pain: No  Muscle cramps: No  Muscle weakness: Yes  Bone fracture: No  Trouble with coordination: No  Dizziness or trouble with balance: No  Fainting or black-out spells: No  Memory loss: No  Headache: No  Seizures: No  Speech problems: No  Tingling: No  Tremor: No  Weakness: Yes  Difficulty walking: Yes  Paralysis: No  Numbness: No

## 2018-12-13 NOTE — RESULT ENCOUNTER NOTE
Pt to recheck level and may transfuse one unit. Pt is aware of hgb of 6.5 and denies active bleeding. Pt reports feeling good.

## 2018-12-13 NOTE — TELEPHONE ENCOUNTER
DATE:  12/13/2018   TIME OF RECEIPT FROM LAB:  0938  LAB TEST:  hgb  LAB VALUE:  6.5  RESULTS GIVEN WITH READ-BACK TO:Tucker Francois  TIME LAB VALUE REPORTED TO PROVIDER:   0939

## 2018-12-13 NOTE — PROGRESS NOTES
Blood Product Transfusion Nursing Note:  Kecia Blue presents today to [facility] for a 1 unit PRBC transfusion.  During today's [facility] appointment orders from Dr. Tarik Christensen were completed.    Progress note:  ID verified by name and .  Assessment completed.  Vitals were stable throughout time in [facility].  verbal and printed education given to patient/representative regarding transfusion and possible side effects.  Patient/representative verbalized understanding.    Type and Crossmatch completed on: 18    All pertinent labs reviewed prior to infusion: YES, transfuse for Hgb <7. Hgb 6.6 today.     Blood Product order verified and double checked for accuracy: YES  2nd : Mulugeta Mtz RN    Date of consent or authorization: 2018. Consent sent to scanning.     Patient tolerated the procedure well    /83 (BP Location: Left arm)   Pulse 81   Temp 98  F (36.7  C) (Oral)   Resp 18   LMP 2014 (Exact Date)   SpO2 100%     Transfusion given over approximately 1.5 hours.     Note: IV left in place per patient request for bronchoscopy tomorrow at [facility].   Report given to Mehnaz Hale RN at 1715 for remainder of transfusion.     Discharge Plan:  Discharge instructions were reviewed with patient Yes  Patient/representative verbalized understanding of discharge instructions and all questions answered Yes.    Discharged from [facility] at 1735 with spouse to home.    Haley Ceballos RN

## 2018-12-14 ENCOUNTER — ANESTHESIA EVENT (OUTPATIENT)
Dept: SURGERY | Facility: CLINIC | Age: 58
End: 2018-12-14
Payer: COMMERCIAL

## 2018-12-14 ENCOUNTER — HOSPITAL ENCOUNTER (OUTPATIENT)
Facility: CLINIC | Age: 58
Discharge: HOME | End: 2018-12-14
Attending: INTERNAL MEDICINE | Admitting: INTERNAL MEDICINE
Payer: COMMERCIAL

## 2018-12-14 ENCOUNTER — TELEPHONE (OUTPATIENT)
Dept: TRANSPLANT | Facility: CLINIC | Age: 58
End: 2018-12-14

## 2018-12-14 ENCOUNTER — ANESTHESIA (OUTPATIENT)
Dept: SURGERY | Facility: CLINIC | Age: 58
End: 2018-12-14
Payer: COMMERCIAL

## 2018-12-14 VITALS
TEMPERATURE: 98.9 F | HEIGHT: 64 IN | WEIGHT: 111.55 LBS | BODY MASS INDEX: 19.04 KG/M2 | SYSTOLIC BLOOD PRESSURE: 133 MMHG | DIASTOLIC BLOOD PRESSURE: 83 MMHG | OXYGEN SATURATION: 99 % | HEART RATE: 89 BPM | RESPIRATION RATE: 18 BRPM

## 2018-12-14 DIAGNOSIS — Z94.2 S/P LUNG TRANSPLANT (H): ICD-10-CM

## 2018-12-14 LAB
APPEARANCE FLD: NORMAL
COLOR FLD: NORMAL
COPATH REPORT: NORMAL
COPATH REPORT: NORMAL
CREAT SERPL-MCNC: 1.16 MG/DL (ref 0.52–1.04)
EOSINOPHIL NFR FLD MANUAL: 1 %
ERYTHROCYTE [DISTWIDTH] IN BLOOD BY AUTOMATED COUNT: 21 % (ref 10–15)
GFR SERPL CREATININE-BSD FRML MDRD: 48 ML/MIN/1.7M2
GLUCOSE SERPL-MCNC: 103 MG/DL (ref 70–99)
GRAM STN SPEC: ABNORMAL
HCT VFR BLD AUTO: 24.1 % (ref 35–47)
HGB BLD-MCNC: 7.3 G/DL (ref 11.7–15.7)
KOH PREP SPEC: NORMAL
LYMPHOCYTES NFR FLD MANUAL: 1 %
MCH RBC QN AUTO: 30.9 PG (ref 26.5–33)
MCHC RBC AUTO-ENTMCNC: 30.3 G/DL (ref 31.5–36.5)
MCV RBC AUTO: 102 FL (ref 78–100)
MONOS+MACROS NFR FLD MANUAL: 4 %
NEUTS BAND NFR FLD MANUAL: 94 %
PLATELET # BLD AUTO: 631 10E9/L (ref 150–450)
POTASSIUM SERPL-SCNC: 4.5 MMOL/L (ref 3.4–5.3)
RBC # BLD AUTO: 2.36 10E12/L (ref 3.8–5.2)
RBC # FLD: NORMAL /UL
SPECIMEN SOURCE FLD: NORMAL
SPECIMEN SOURCE: ABNORMAL
SPECIMEN SOURCE: NORMAL
WBC # BLD AUTO: 1.4 10E9/L (ref 4–11)
WBC # FLD AUTO: 9520 /UL

## 2018-12-14 PROCEDURE — 87633 RESP VIRUS 12-25 TARGETS: CPT | Performed by: INTERNAL MEDICINE

## 2018-12-14 PROCEDURE — 37000008 ZZH ANESTHESIA TECHNICAL FEE, 1ST 30 MIN: Performed by: INTERNAL MEDICINE

## 2018-12-14 PROCEDURE — 36000061 ZZH SURGERY LEVEL 3 W FLUORO 1ST 30 MIN - UMMC: Performed by: INTERNAL MEDICINE

## 2018-12-14 PROCEDURE — 89051 BODY FLUID CELL COUNT: CPT | Performed by: INTERNAL MEDICINE

## 2018-12-14 PROCEDURE — 87070 CULTURE OTHR SPECIMN AEROBIC: CPT | Performed by: INTERNAL MEDICINE

## 2018-12-14 PROCEDURE — 36415 COLL VENOUS BLD VENIPUNCTURE: CPT | Performed by: ANESTHESIOLOGY

## 2018-12-14 PROCEDURE — 25000125 ZZHC RX 250: Performed by: NURSE ANESTHETIST, CERTIFIED REGISTERED

## 2018-12-14 PROCEDURE — 25000128 H RX IP 250 OP 636: Performed by: ANESTHESIOLOGY

## 2018-12-14 PROCEDURE — 87015 SPECIMEN INFECT AGNT CONCNTJ: CPT | Performed by: INTERNAL MEDICINE

## 2018-12-14 PROCEDURE — 88312 SPECIAL STAINS GROUP 1: CPT | Performed by: INTERNAL MEDICINE

## 2018-12-14 PROCEDURE — 87116 MYCOBACTERIA CULTURE: CPT | Performed by: INTERNAL MEDICINE

## 2018-12-14 PROCEDURE — 82565 ASSAY OF CREATININE: CPT | Performed by: ANESTHESIOLOGY

## 2018-12-14 PROCEDURE — 87102 FUNGUS ISOLATION CULTURE: CPT | Performed by: INTERNAL MEDICINE

## 2018-12-14 PROCEDURE — 87210 SMEAR WET MOUNT SALINE/INK: CPT | Performed by: INTERNAL MEDICINE

## 2018-12-14 PROCEDURE — 88108 CYTOPATH CONCENTRATE TECH: CPT | Performed by: INTERNAL MEDICINE

## 2018-12-14 PROCEDURE — 40000171 ZZH STATISTIC PRE-PROCEDURE ASSESSMENT III: Performed by: INTERNAL MEDICINE

## 2018-12-14 PROCEDURE — 37000009 ZZH ANESTHESIA TECHNICAL FEE, EACH ADDTL 15 MIN: Performed by: INTERNAL MEDICINE

## 2018-12-14 PROCEDURE — 25000128 H RX IP 250 OP 636: Performed by: NURSE ANESTHETIST, CERTIFIED REGISTERED

## 2018-12-14 PROCEDURE — 87077 CULTURE AEROBIC IDENTIFY: CPT | Performed by: INTERNAL MEDICINE

## 2018-12-14 PROCEDURE — 87081 CULTURE SCREEN ONLY: CPT | Performed by: INTERNAL MEDICINE

## 2018-12-14 PROCEDURE — 87185 SC STD ENZYME DETCJ PER NZM: CPT | Performed by: INTERNAL MEDICINE

## 2018-12-14 PROCEDURE — 87106 FUNGI IDENTIFICATION YEAST: CPT | Performed by: INTERNAL MEDICINE

## 2018-12-14 PROCEDURE — C1726 CATH, BAL DIL, NON-VASCULAR: HCPCS | Performed by: INTERNAL MEDICINE

## 2018-12-14 PROCEDURE — 87206 SMEAR FLUORESCENT/ACID STAI: CPT | Performed by: INTERNAL MEDICINE

## 2018-12-14 PROCEDURE — 85027 COMPLETE CBC AUTOMATED: CPT | Performed by: ANESTHESIOLOGY

## 2018-12-14 PROCEDURE — 71000014 ZZH RECOVERY PHASE 1 LEVEL 2 FIRST HR: Performed by: INTERNAL MEDICINE

## 2018-12-14 PROCEDURE — 87076 CULTURE ANAEROBE IDENT EACH: CPT | Performed by: INTERNAL MEDICINE

## 2018-12-14 PROCEDURE — 36000059 ZZH SURGERY LEVEL 3 EA 15 ADDTL MIN UMMC: Performed by: INTERNAL MEDICINE

## 2018-12-14 PROCEDURE — 87181 SC STD AGAR DILUTION PER AGT: CPT | Performed by: INTERNAL MEDICINE

## 2018-12-14 PROCEDURE — 87205 SMEAR GRAM STAIN: CPT | Performed by: INTERNAL MEDICINE

## 2018-12-14 PROCEDURE — 82947 ASSAY GLUCOSE BLOOD QUANT: CPT | Performed by: ANESTHESIOLOGY

## 2018-12-14 PROCEDURE — 71000027 ZZH RECOVERY PHASE 2 EACH 15 MINS: Performed by: INTERNAL MEDICINE

## 2018-12-14 PROCEDURE — 84132 ASSAY OF SERUM POTASSIUM: CPT | Performed by: ANESTHESIOLOGY

## 2018-12-14 PROCEDURE — 27210794 ZZH OR GENERAL SUPPLY STERILE: Performed by: INTERNAL MEDICINE

## 2018-12-14 RX ORDER — SODIUM CHLORIDE, SODIUM LACTATE, POTASSIUM CHLORIDE, CALCIUM CHLORIDE 600; 310; 30; 20 MG/100ML; MG/100ML; MG/100ML; MG/100ML
INJECTION, SOLUTION INTRAVENOUS CONTINUOUS
Status: DISCONTINUED | OUTPATIENT
Start: 2018-12-14 | End: 2018-12-14 | Stop reason: HOSPADM

## 2018-12-14 RX ORDER — ONDANSETRON 4 MG/1
4 TABLET, ORALLY DISINTEGRATING ORAL EVERY 30 MIN PRN
Status: DISCONTINUED | OUTPATIENT
Start: 2018-12-14 | End: 2018-12-14 | Stop reason: HOSPADM

## 2018-12-14 RX ORDER — LIDOCAINE 40 MG/G
CREAM TOPICAL
Status: DISCONTINUED | OUTPATIENT
Start: 2018-12-14 | End: 2018-12-14 | Stop reason: HOSPADM

## 2018-12-14 RX ORDER — PROPOFOL 10 MG/ML
INJECTION, EMULSION INTRAVENOUS CONTINUOUS PRN
Status: DISCONTINUED | OUTPATIENT
Start: 2018-12-14 | End: 2018-12-14

## 2018-12-14 RX ORDER — FENTANYL CITRATE 50 UG/ML
25-50 INJECTION, SOLUTION INTRAMUSCULAR; INTRAVENOUS
Status: DISCONTINUED | OUTPATIENT
Start: 2018-12-14 | End: 2018-12-14 | Stop reason: HOSPADM

## 2018-12-14 RX ORDER — LIDOCAINE HYDROCHLORIDE 10 MG/ML
INJECTION, SOLUTION INFILTRATION; PERINEURAL PRN
Status: DISCONTINUED | OUTPATIENT
Start: 2018-12-14 | End: 2018-12-14

## 2018-12-14 RX ORDER — NALOXONE HYDROCHLORIDE 0.4 MG/ML
.1-.4 INJECTION, SOLUTION INTRAMUSCULAR; INTRAVENOUS; SUBCUTANEOUS
Status: DISCONTINUED | OUTPATIENT
Start: 2018-12-14 | End: 2018-12-14 | Stop reason: HOSPADM

## 2018-12-14 RX ORDER — MEPERIDINE HYDROCHLORIDE 50 MG/ML
12.5 INJECTION INTRAMUSCULAR; INTRAVENOUS; SUBCUTANEOUS
Status: DISCONTINUED | OUTPATIENT
Start: 2018-12-14 | End: 2018-12-14 | Stop reason: HOSPADM

## 2018-12-14 RX ORDER — ONDANSETRON 2 MG/ML
INJECTION INTRAMUSCULAR; INTRAVENOUS PRN
Status: DISCONTINUED | OUTPATIENT
Start: 2018-12-14 | End: 2018-12-14

## 2018-12-14 RX ORDER — FENTANYL CITRATE 50 UG/ML
INJECTION, SOLUTION INTRAMUSCULAR; INTRAVENOUS PRN
Status: DISCONTINUED | OUTPATIENT
Start: 2018-12-14 | End: 2018-12-14

## 2018-12-14 RX ORDER — ONDANSETRON 2 MG/ML
4 INJECTION INTRAMUSCULAR; INTRAVENOUS EVERY 30 MIN PRN
Status: DISCONTINUED | OUTPATIENT
Start: 2018-12-14 | End: 2018-12-14 | Stop reason: HOSPADM

## 2018-12-14 RX ORDER — TACROLIMUS 1 MG/1
CAPSULE ORAL
Qty: 240 CAPSULE | Refills: 11 | Status: SHIPPED | OUTPATIENT
Start: 2018-12-14 | End: 2018-12-21

## 2018-12-14 RX ORDER — PROPOFOL 10 MG/ML
INJECTION, EMULSION INTRAVENOUS PRN
Status: DISCONTINUED | OUTPATIENT
Start: 2018-12-14 | End: 2018-12-14

## 2018-12-14 RX ORDER — SODIUM CHLORIDE, SODIUM LACTATE, POTASSIUM CHLORIDE, CALCIUM CHLORIDE 600; 310; 30; 20 MG/100ML; MG/100ML; MG/100ML; MG/100ML
INJECTION, SOLUTION INTRAVENOUS CONTINUOUS PRN
Status: DISCONTINUED | OUTPATIENT
Start: 2018-12-14 | End: 2018-12-14

## 2018-12-14 RX ORDER — DEXAMETHASONE SODIUM PHOSPHATE 4 MG/ML
INJECTION, SOLUTION INTRA-ARTICULAR; INTRALESIONAL; INTRAMUSCULAR; INTRAVENOUS; SOFT TISSUE PRN
Status: DISCONTINUED | OUTPATIENT
Start: 2018-12-14 | End: 2018-12-14

## 2018-12-14 RX ORDER — SODIUM CHLORIDE 9 MG/ML
INJECTION, SOLUTION INTRAVENOUS CONTINUOUS
Status: DISCONTINUED | OUTPATIENT
Start: 2018-12-14 | End: 2018-12-14 | Stop reason: HOSPADM

## 2018-12-14 RX ORDER — ONDANSETRON 2 MG/ML
4 INJECTION INTRAMUSCULAR; INTRAVENOUS ONCE
Status: COMPLETED | OUTPATIENT
Start: 2018-12-14 | End: 2018-12-14

## 2018-12-14 RX ADMIN — SUGAMMADEX 200 MG: 100 INJECTION, SOLUTION INTRAVENOUS at 10:58

## 2018-12-14 RX ADMIN — ONDANSETRON 4 MG: 2 INJECTION INTRAMUSCULAR; INTRAVENOUS at 09:17

## 2018-12-14 RX ADMIN — MIDAZOLAM 2 MG: 1 INJECTION INTRAMUSCULAR; INTRAVENOUS at 10:21

## 2018-12-14 RX ADMIN — PROPOFOL 150 MCG/KG/MIN: 10 INJECTION, EMULSION INTRAVENOUS at 10:33

## 2018-12-14 RX ADMIN — PROPOFOL 100 MG: 10 INJECTION, EMULSION INTRAVENOUS at 10:33

## 2018-12-14 RX ADMIN — LIDOCAINE HYDROCHLORIDE 60 MG: 10 INJECTION, SOLUTION INFILTRATION; PERINEURAL at 10:33

## 2018-12-14 RX ADMIN — ONDANSETRON 4 MG: 2 INJECTION INTRAMUSCULAR; INTRAVENOUS at 10:39

## 2018-12-14 RX ADMIN — FENTANYL CITRATE 50 MCG: 50 INJECTION, SOLUTION INTRAMUSCULAR; INTRAVENOUS at 10:33

## 2018-12-14 RX ADMIN — ROCURONIUM BROMIDE 40 MG: 10 INJECTION INTRAVENOUS at 10:33

## 2018-12-14 RX ADMIN — FENTANYL CITRATE 25 MCG: 50 INJECTION, SOLUTION INTRAMUSCULAR; INTRAVENOUS at 10:38

## 2018-12-14 RX ADMIN — SODIUM CHLORIDE, POTASSIUM CHLORIDE, SODIUM LACTATE AND CALCIUM CHLORIDE: 600; 310; 30; 20 INJECTION, SOLUTION INTRAVENOUS at 10:21

## 2018-12-14 RX ADMIN — DEXAMETHASONE SODIUM PHOSPHATE 10 MG: 4 INJECTION, SOLUTION INTRA-ARTICULAR; INTRALESIONAL; INTRAMUSCULAR; INTRAVENOUS; SOFT TISSUE at 10:39

## 2018-12-14 ASSESSMENT — MIFFLIN-ST. JEOR: SCORE: 1081

## 2018-12-14 NOTE — ANESTHESIA CARE TRANSFER NOTE
Patient: Kecia Blue    Procedure(s):  Flexible And Rigid Bronchoscopy, Balloon Dilation, Bronchial Washing    Diagnosis: Airway Stenosis   Diagnosis Additional Information: No value filed.    Anesthesia Type:   No value filed.     Note:  Airway :Face Mask  Patient transferred to:PACU  Comments: Pt recovering from her GA in the PACU following a bronchoscopy with balloon dilation. Pt VSS upon arrival to the PACU. Pt has no c/o pain/N/V. Pt care report given to receiving RN>       Vitals: (Last set prior to Anesthesia Care Transfer)    CRNA VITALS  12/14/2018 1035 - 12/14/2018 1112      12/14/2018             Pulse:  115    Ht Rate:  91    SpO2:  100 %    Resp Rate (observed):  1  (Abnormal)     Resp Rate (set):  18                Electronically Signed By: ADRIÁN Aguilar CRNA  December 14, 2018  11:12 AM

## 2018-12-14 NOTE — PROGRESS NOTES
"HISTORY OF PRESENT ILLNESS  Ms. Blue is a pleasant 58 year old year old female status post lung transplant who presents to clinic today with right knee pain.   Kecia explains that she had left knee pain following an incident during PT in May of this year. Following that she had left knee pain and swelling. For this she had a cortisone injection with resolution of symptoms. However, over the last few months she has noticed \"stiffness\" in the right knee. This is worse when she wakes in the morning. With this she has noticed enlargement of her right knee. Patient does endorse issues with fluid retention since receiving lung transplant. Today was the first time she experienced pain in the knee. This was an achy \"fullness\" in the right knee. With this she had troubles bending the knee. Denies any redness or warmth to the right knee.     Further, patient does report extensive history of lower back pain. With this she has radiation of discomfort down the posterior aspect of her right leg. At times she needs a pillow under her right side when in a vehicle for extended periods of time. Also has pain and discomfort when walking or standing for more than 10 minutes. Denies any numbness or tingling in the lower extremities. Patient does endorse becoming very deconditioned secondary to her current medical conditions.     Patient is from [place] and has been following with pulmonology here.     Location: right knee   Quality:  Achy \"fullness\" pain    Severity: 5/10 at worst    Duration: 1 day   Timing: occurs after waking   Modifying factors:  resting and non-use makes it better, bending the knee and use makes it worse  Associated signs & symptoms: knee swelling. Lower back pain with right posterior leg pain   Previous similar pain: no  Exercise: none   Additional history: as documented    MEDICAL HISTORY  Patient Active Problem List   Diagnosis     Acute on chronic respiratory failure with hypoxia (H)     ILD " (interstitial lung disease) (H)     Lung transplant recipient (H)     Steroid-induced hyperglycemia     Deep vein thrombosis (DVT) (H) [I82.409]     Encounter for long-term (current) use of high-risk medication     Dehydration     Acute kidney injury (H)     CMV (cytomegalovirus) (H)     Nausea and vomiting     Low hemoglobin       Current Outpatient Medications   Medication Sig Dispense Refill     ACETAMINOPHEN PO Take 1,000 mg by mouth every 6 hours as needed for pain       albuterol (PROAIR HFA/PROVENTIL HFA/VENTOLIN HFA) 108 (90 Base) MCG/ACT Inhaler Inhale 2 puffs into the lungs every 6 hours as needed for shortness of breath / dyspnea or wheezing 1 Inhaler 1     azaTHIOprine (IMURAN) 50 MG tablet Take 3 tablets (150 mg) by mouth every evening 90 tablet 11     calcium-vitamin D (CALTRATE) 600-400 MG-UNIT per tablet Take 1 tablet by mouth daily       dronabinol (MARINOL) 5 MG capsule Take 1 capsule (5 mg) by mouth 2 times daily (before meals) 60 capsule 0     ferrous sulfate (FEROSUL) 325 (65 Fe) MG tablet Take 1 tablet (325 mg) by mouth 2 times daily 60 tablet 2     guaiFENesin-codeine (ROBITUSSIN AC) 100-10 MG/5ML SOLN solution Take 5-10 mLs by mouth every 6 hours as needed for cough 120 mL 0     magnesium oxide (MAG-OX) 400 MG tablet Take 1 tablet (400 mg) by mouth daily 90 tablet 3     metoprolol tartrate (LOPRESSOR) 25 MG tablet Take 1 tablet (25 mg) by mouth 2 times daily 60 tablet 11     multivitamin, therapeutic with minerals (THERA-VIT-M) TABS tablet Take 1 tablet by mouth daily 30 each 11     ondansetron (ZOFRAN) 4 MG tablet Take 1 tablet (4 mg) by mouth every 12 hours as needed for nausea 60 tablet 1     order for DME Equipment being ordered: Nebulizer 1 Device 1     order for DME Equipment being ordered: Nebulizer 1 Device 1     pantoprazole (PROTONIX) 40 MG EC tablet Take 1 tablet (40 mg) by mouth 2 times daily 60 tablet 3     pentoxifylline (TRENTAL) 400 MG CR tablet Take 1 tablet (400 mg) by mouth  2 times daily 60 tablet 11     predniSONE (DELTASONE) 5 MG tablet Take 5mg in AM and 2.5mg in AM.. 180 tablet 3     sulfamethoxazole-trimethoprim (BACTRIM DS/SEPTRA DS) 800-160 MG per tablet Take 1 tablet by mouth daily X one month. 30 tablet 1     tacrolimus (GENERIC EQUIVALENT) 1 MG capsule Total dose: 5 mg in the AM and 4 mg in the  capsule 11     [START ON 12/14/2018] valGANciclovir (VALCYTE) 450 MG tablet Take 1 tablet (450 mg) by mouth three times a week 12 tablet 11       No Known Allergies    Family History   Problem Relation Age of Onset     Hypertension Mother      Arthritis Mother      Pancreatic Cancer Father      Diabetes Father        Additional medical/Social/Surgical histories reviewed in [facility] and updated as appropriate.     REVIEW OF SYSTEMS (12/13/2018)  10 point ROS of systems including Constitutional, Eyes, Respiratory, Cardiovascular, Gastroenterology, Genitourinary, Integumentary, Musculoskeletal, Psychiatric were all negative except for pertinent positives noted in my HPI.     PHYSICAL EXAM  Vitals:    12/13/18 1758   BP: 135/83   Pulse: 85   Resp: 16     Vital Signs: /83   Pulse 85   Resp 16   LMP 06/07/2014 (Exact Date)  Patient declined being weighed. There is no height or weight on file to calculate BMI.    General  - normal appearance, in no obvious distress  CV  - normal peripheral perfusion. Normal dorsalis pedis pulse.   Pulm  - normal respiratory pattern, non-labored  Musculoskeletal - lumbar spine  - stance: mild antalgic gait, no obvious leg length discrepancy,   - inspection: normal bone and joint alignment, no obvious scoliosis  - palpation: no paravertebral or bony tenderness  - ROM: normal flexion, extension, sidebending, rotation, causes pain in low back on right  - strength: lower extremities 5/5 in all planes  - special tests:  (+) straight leg raise- right  (+) slump test- right low back pain  Neuro  - patellar and Achilles DTRs 2+ bilaterally, no lower  extremity sensory deficit throughout L5 distribution, grossly normal coordination, normal muscle tone  Skin  - no ecchymosis, erythema, warmth, or induration, no obvious rash  Psych  - interactive, appropriate, normal mood and affect    Musculoskeletal - knee right   - stance: mildly antalgic gait, genu varum  - inspection: generalized swelling, bilateral effusion  - palpation: medial joint line tenderness on the right, patella and patellar tendon non-tender, normal popliteal pulse  - ROM: 100 degrees flexion, 0 degrees extension, painful active ROM  - strength: 5/5 in flexion, 5/5 in extension  - neuro: no sensory or motor deficit  - special tests:  (-) Lachman  (-) anterior drawer  (-) posterior drawer  (-) pivot shift  (-) Kenan  (-) Thessaly  (-) varus at 0 and 30 degrees flexion  (-) valgus at 0 and 30 degrees flexion  (+) Mango s compression test  (-) patellar apprehension    ASSESSMENT & PLAN  Kecia Blue is a 58 year old female who presents for evaluation of right knee pain consistent with osteoarthritis with knee effusion.     -Orders placed for bilateral knee x-ray and lumbar spine x-ray. Patient elected to pursue more conservative management at this time and understands that orders are valid for the next 90 days if they want further workup.   -Follow up in one week if not improving   -Discussed ice, compression, and Tylenol for knee swelling and pain   -Advised further evaluation of back pain if remaining symptomatic       Kd Coleman MD, CAQSM

## 2018-12-14 NOTE — PROCEDURES
INTERVENTIONAL PULMONOLOGY     Procedure(s):    A flexible and rigid bronchoscopy   Airway exam  Balloon bronchoplasty without stent placement (1 sites)   Bronchial washing (1 sites)    Indication:  S/p nikki lung transplant, right anastomosis stenosis, multiple interventions in the past    Attending of Record:  MD Kimmie Galeano MD    Interventional Pulmonary Fellow   Preet Patel MD     Trainees Present:   Shree Alexander MD     Medications:    General Anesthesia - See anesthesia flowsheet for details    Sedation Time:   Per Anesthesia Care Provider    Time Out:  Performed    I have reviewed the patient's medical record and indication for the procedure. Physical examination was performed.  The risks, benefits and alternatives of the procedure were discussed with the the patient in detail and she had the opportunity to ask questions.  I discussed in particular the potential complications including risks of minor or life-threatening bleeding and/or infection, respiratory failure, vocal cord trauma / paralysis, pneumothorax, and discomfort. Sedation risks were also discussed including abnormal heart rhythms, low blood pressure, and respiratory failure. All questions were answered to the best of my ability.  Verbal and written informed consent was obtained.  The proposed procedure and the patient's identification were verified prior to the procedure by the physician and the nurse.    The patient was assessed for the adequacy for the procedure and to receive medications.   Mental Status:  Alert and oriented x 3  Airway examination:  Class I (Complete visualization of soft palate)  Pulmonary:  Clear to ausculation bilaterally  CV:  RRR, no murmurs or gallops  ASA Grade:  (II)  Mild systemic disease    After clinical evaluation and reviewing the indication, risks, alternatives and benefits of the procedure the patient was deemed to be in satisfactory condition to undergo the procedure.      A  Tuberculosis risk assessment was performed:  The patient has no known RISK of Tuberculosis    The procedure was performed in a negative airflow room: The patient could not be moved to a negative airflow room because of no risk of TB    Maneuvers / Procedure:      A Flexible and 8.5mm Rigid bronchoscope bronchoscope was used for the procedure. The rigid bronchoscope was inserted into the mouth. Uvula, epiglottis and vocal cords were seen. The scope was advanced turning the bevel to 90degress while passing through the cords and into the trachea.   Airway dilation: Airway dilation#1: The 8-9-10mm Elation Ballooon was used to dilate the Right mainstem and BI  airway. Each dilation was held for 60sec and repeated 3 times    Airway Examination: A complete airway examination was performed from the distal trachea to the subsegmental level in each lobe of both lungs.  Pertinent findings include L side intact, right anastomosis stenotic at 3-4 mm.         Bronchial washing: Right mainstem was washed and sent for analysis.     Any disposable equipment was visually inspected and deemed to be intact immediately post procedure.      Relevant Pictures    L anastomosis      KONRAD and LLL      R anastomosis       R anastomosis      R anastomosis post 10 mm balloon dilation          Recommendations:     -->  Await cytology  -->  Repeat bronch in 4 weeks time      DA Lin MD  [phone]

## 2018-12-14 NOTE — TELEPHONE ENCOUNTER
Tacrolimus level 13 at 13 hours, on 12/13/18  Goal 9-11.   Current dose 5 mg in AM, 4 mg in PM    Dose changed to  4 mg in AM, 4 mg in PM   Recheck level in 5-7 days    Called patient with the above. Patient verbalized understanding and in agreement.

## 2018-12-14 NOTE — DISCHARGE INSTRUCTIONS
Post Bronchoscopy Patient Instructions:    December 14, 2018  Kecia Blue    Your procedure completed (bronchoscopy with dilation) without any immediate complications.  You may cough up scant amount of blood for the next 12 hours. If you have excessive cough with blood, chest pain, shortness of breath, please report to the closest emergency room.    You may experience low grade (less than 100.5 F) fever next 24 hours, if so you can take tylenol. If the fever persists more than 24 hours contact to our office or your primary care provider.    Our office (Thoracic/Pulmonary--[phone]) will call you as soon as the results of biopsies are ready.      May resume your regular diet as it was prior to procedure.    Should you have any question, please do not hesitate to call our office.  DA Lin MD        Take it easy when you get home.  Remember, same day surgery DOES NOT MEAN SAME DAY RECOVERY!  Healing is a gradual process.  You will need some time to recover - you may be more tired than you realize at first.  Rest and relax for at least the first 24 hours at home.  You'll feel better and heal faster if you take good care of yourself.    [facility], [place]  Same-Day Surgery   Adult Discharge Orders & Instructions     For 24 hours after surgery    1. Get plenty of rest.  A responsible adult must stay with you for at least 24 hours after you leave the hospital.   2. Do not drive or use heavy equipment.  If you have weakness or tingling, don't drive or use heavy equipment until this feeling goes away.  3. Do not drink alcohol.  4. Avoid strenuous or risky activities.  Ask for help when climbing stairs.   5. You may feel lightheaded.  IF so, sit for a few minutes before standing.  Have someone help you get up.   6. If you have nausea (feel sick to your stomach): Drink only clear liquids such as apple juice, ginger ale, broth or 7-Up.  Rest may also help.  Be sure to drink  enough fluids.  Move to a regular diet as you feel able.  7. You may have a slight fever. Call the doctor if your fever is over 100 F (37.7 C) (taken under the tongue) or lasts longer than 24 hours.  8. You may have a dry mouth, a sore throat, muscle aches or trouble sleeping.  These should go away after 24 hours.  9. Do not make important or legal decisions.   Call your doctor for any of the followin.  Signs of infection (fever, growing tenderness at the surgery site, a large amount of drainage or bleeding, severe pain, foul-smelling drainage, redness, swelling).    2. It has been over 8 to 10 hours since surgery and you are still not able to urinate (pass water).    3.  Headache for over 24 hours.      To contact a doctor, call Dr. Lin's office at [phone] or:        [phone] and ask for the resident on call for          Pulmonary (answered 24 hours a day)- at night or on the weekend      Emergency Department:    [facility]: [phone]       (TTY for hearing impaired: [phone])

## 2018-12-14 NOTE — ANESTHESIA PREPROCEDURE EVALUATION
Anesthesia Pre-Procedure Evaluation    Patient: Kecia Blue   MRN:     [id number] Gender:   female   Age:    58 year old :      1960        Preoperative Diagnosis: Airway Stenosis    Procedure(s):  Flexible And Rigid Bronchoscopy, Balloon Dilation, Bronchial Washing     Past Medical History:   Diagnosis Date     Antisynthetase syndrome (H)      Chronic cough      Chronic infection     recent C diff       Dehydration 2018     Dermatomyositis (H)      Dysplasia of cervix, low grade (ESTRADA 1)      ILD (interstitial lung disease) (H)      Osteopenia      PONV (postoperative nausea and vomiting)      Pulmonary hypertension (H)      Raynaud's disease      Seronegative rheumatoid arthritis (H)       Past Surgical History:   Procedure Laterality Date     BRONCHOSCOPY (RIGID OR FLEXIBLE), DIAGNOSTIC N/A 4/10/2018    Procedure: COMBINED BRONCHOSCOPY (RIGID OR FLEXIBLE), LAVAGE;;  Surgeon: Mariposa Donohue MD;  Location:  GI     BRONCHOSCOPY (RIGID OR FLEXIBLE), DILATE BRONCHUS / TRACHEA N/A 10/11/2018    Procedure: BRONCHOSCOPY (RIGID OR FLEXIBLE), DILATE BRONCHUS / TRACHEA;  Flexible And Rigid Bronchoscopy and Dilation;  Surgeon: Wilber Lin MD;  Location: [facility] OR     BRONCHOSCOPY FLEXIBLE N/A 3/13/2018    Procedure: BRONCHOSCOPY FLEXIBLE;  Flexible Bronchoscopy ;  Surgeon: Gissell Sanchez MD;  Location: [facility] GI     BRONCHOSCOPY FLEXIBLE N/A 2018    Procedure: BRONCHOSCOPY FLEXIBLE;;  Surgeon: Wilebr Lin MD;  Location: [facility] GI     BRONCHOSCOPY FLEXIBLE AND RIGID N/A 9/10/2018    Procedure: BRONCHOSCOPY FLEXIBLE AND RIGID;  Flexible and Rigid Bronchoscopy with Balloon Dilation, tissue debulking with cryo, and Right mainstem bronchus stent placement;  Surgeon: Wilber Lin MD;  Location: [facility] OR     BRONCHOSCOPY RIGID N/A 2018    Procedure: BRONCHOSCOPY RIGID;;  Surgeon: Lopez Macias MD;  Location: [facility] GI     BRONCHOSCOPY, DILATE BRONCHUS, STENT  BRONCHUS, COMBINED N/A 6/11/2018    Procedure: COMBINED BRONCHOSCOPY, DILATE BRONCHUS, STENT BRONCHUS;  Flexible Bronchoscopy, Balloon Dilation, Bronchial Washings;  Surgeon: Wilber Lin MD;  Location: [facility] OR     BRONCHOSCOPY, DILATE BRONCHUS, STENT BRONCHUS, COMBINED Right 7/10/2018    Procedure: COMBINED BRONCHOSCOPY, DILATE BRONCHUS, STENT BRONCHUS;  Flexible Bronchoscopy, Balloon Dilation, Bronchial Washings  ;  Surgeon: Wilber Lin MD;  Location: [facility] OR     BRONCHOSCOPY, DILATE BRONCHUS, STENT BRONCHUS, COMBINED N/A 8/2/2018    Procedure: COMBINED BRONCHOSCOPY, DILATE BRONCHUS, STENT BRONCHUS;  Flexible Bronchoscopy, Bronchial Washings, Balloon Dilation;  Surgeon: Wilber Lin MD;  Location: [facility]     BRONCHOSCOPY, DILATE BRONCHUS, STENT BRONCHUS, COMBINED N/A 8/20/2018    Procedure: COMBINED BRONCHOSCOPY, DILATE BRONCHUS, STENT BRONCHUS;  Flexible Bronchoscopy, Balloon Dilation;  Surgeon: Wilber Lin MD;  Location: [facility]     BRONCHOSCOPY, DILATE BRONCHUS, STENT BRONCHUS, COMBINED N/A 10/29/2018    Procedure: Flexible Bronchoscopy, Balloon Dilation, Stent Revision, Airway Examination And Therapeutic Suctioning, Cyro Tumor Debulking;  Surgeon: Wilber Lin MD;  Location: [facility] OR     BRONCHOSCOPY, DILATE BRONCHUS, STENT BRONCHUS, COMBINED N/A 11/20/2018    Procedure: Rigid Bronchoscopy, Stent Removal and dilitation;  Surgeon: Wilber Lin MD;  Location: [facility] OR     ENT SURGERY      tonsillectomy as a child     ESOPHAGOSCOPY, GASTROSCOPY, DUODENOSCOPY (EGD), COMBINED N/A 10/29/2018    Procedure: COMBINED ESOPHAGOSCOPY, GASTROSCOPY, DUODENOSCOPY (EGD) with biopsies and polypectomy;  Surgeon: Chente Bloom MD;  Location: [facility] OR     INSERT EXTRACORPORAL MEMBRANE OXYGENATOR ADULT (OUTSIDE OR) N/A 2/27/2018    Procedure: INSERT EXTRACORPORAL MEMBRANE OXYGENATOR ADULT (OUTSIDE OR);  INSERT EXTRACORPORAL MEMBRANE OXYGENATOR ADULT (OUTSIDE OR) ;  Surgeon:  Toby Hernandez MD;  Location: [facility] OR     no prior surgery       REMOVE EXTRACORPORAL MEMBRANE OXYGENATOR ADULT N/A 3/3/2018    Procedure: REMOVE EXTRACORPORAL MEMBRANE OXYGENATOR ADULT;  Removal of Right Femoral Venous and Right Axillary Arterial Extracorporeal Membrane Oxygenator;  Surgeon: Toby Hernandez MD;  Location: [facility] OR     TRANSPLANT LUNG RECIPIENT SINGLE X2 Bilateral 3/1/2018    Procedure: TRANSPLANT LUNG RECIPIENT SINGLE X2;  Median Sternotomy, Extracorporeal Membrane Oxygenator, bilateral sequential lung transplant;  Surgeon: Toby Hernandez MD;  Location: [facility]                   PHYSICAL EXAM:   Mental Status/Neuro: A/A/O   Airway:   Mallampati: I  Mouth/Opening: Full  TM distance: > 6 cm  Neck ROM: Full   Respiratory:    CV:    Comments:      Dental: Normal                  Lab Results   Component Value Date    WBC 1.4 (L) 12/14/2018    HGB 7.3 (L) 12/14/2018    HCT 24.1 (L) 12/14/2018     (H) 12/14/2018     12/13/2018    POTASSIUM 4.5 12/14/2018    CHLORIDE 101 12/13/2018    CO2 26 12/13/2018    BUN 28 12/13/2018    CR 1.16 (H) 12/14/2018     (H) 12/14/2018    CHELSEY 8.6 12/13/2018    PHOS 2.6 08/03/2018    MAG 1.6 12/13/2018    ALBUMIN 2.9 (L) 10/29/2018    PROTTOTAL 6.9 10/29/2018    ALT 11 10/29/2018    AST 10 10/29/2018    ALKPHOS 73 10/29/2018    BILITOTAL 0.3 10/29/2018    AMYLASE 58 02/19/2018    PTT 26 08/03/2018    INR 1.02 12/13/2018    FIBR 279 03/05/2018    TSH 1.80 08/01/2018       Preop Vitals  BP Readings from Last 3 Encounters:   12/14/18 142/88   12/13/18 135/83   12/13/18 135/83    Pulse Readings from Last 3 Encounters:   12/14/18 91   12/13/18 85   12/13/18 85      Resp Readings from Last 3 Encounters:   12/14/18 16   12/13/18 16   12/13/18 16    SpO2 Readings from Last 3 Encounters:   12/14/18 99%   12/13/18 99%   12/13/18 100%      Temp Readings from Last 1 Encounters:   12/14/18 36.9  C (98.4  F) (Oral)    Ht Readings from Last 1  "Encounters:   12/14/18 1.626 m (5' 4\")      Wt Readings from Last 1 Encounters:   12/14/18 50.6 kg (111 lb 8.8 oz)    Estimated body mass index is 19.15 kg/m  as calculated from the following:    Height as of this encounter: 1.626 m (5' 4\").    Weight as of this encounter: 50.6 kg (111 lb 8.8 oz).     LDA:  Peripheral IV 12/13/18 Right Upper forearm (Active)   Site Assessment WDL 12/14/2018  9:20 AM   Line Status Saline locked 12/14/2018  9:20 AM   Phlebitis Scale 0-->no symptoms 12/14/2018  9:20 AM   Infiltration Scale 0 12/14/2018  9:20 AM   Number of days: 1       ETT (adult) (Active)   Number of days: 0            Assessment:   ASA SCORE: 3    NPO Status: > 6 hours since completed Solid [employer]   Documentation: H&P complete; Preop Testing complete   Proceeding: Proceed without further delay  Tobacco Use:  NO Active use of Tobacco/UNKNOWN Tobacco use status     Plan:   Anes. Type:  General   Pre-Induction: Midazolam IV   Induction:  IV (Standard)   Airway: Oral ETT   Access/Monitoring: PIV   Maintenance: IV   Emergence: Procedure Site        Postop Pain/Sedation Strategy:  Standard-Options: Opioids PRN     PONV Management:  Adult Risk Factors: Female, H/o PONV or Motion Sickness, Non-Smoker, Postop Opioids     CONSENT: Direct conversation   Plan and risks discussed with: Patient                            Leelee Cruz MD  "

## 2018-12-14 NOTE — ANESTHESIA POSTPROCEDURE EVALUATION
Anesthesia POST Procedure Evaluation    Patient: Kecia Blue   MRN:     [id number] Gender:   female   Age:    58 year old :      1960        Preoperative Diagnosis: Airway Stenosis    Procedure(s):  Flexible And Rigid Bronchoscopy, Balloon Dilation, Bronchial Washing   Postop Comments: No value filed.       Anesthesia Type:  General    Reportable Event: NO     PAIN: Uncomplicated   Sign Out status: Comfortable, Well controlled pain     PONV: No PONV   Sign Out status:  No Nausea or Vomiting     Neuro/Psych: Uneventful perioperative course   Sign Out Status: Preoperative baseline     Airway/Resp.: Uneventful perioperative course   Sign Out Status: Resp. Status within EXPECTED Parameters     CV: Uneventful perioperative course   Sign Out status: Appropriate BP and perfusion indices; Appropriate HR/Rhythm     Disposition:   Sign Out in:  PACU  Recovery Course: Uneventful  Follow-Up: Not required           Last Anesthesia Record Vitals:  CRNA VITALS  2018 1035 - 2018 1135      2018             Pulse:  115    Ht Rate:  91    SpO2:  100 %    Resp Rate (observed):  1  (Abnormal)     Resp Rate (set):  18          Last PACU/Preop Vitals:  Vitals:    18 1130 18 1145 18 1200   BP: 133/78 121/79 129/80   Pulse:      Resp: 22 22 20   Temp: 37.4  C (99.4  F) 37.3  C (99.1  F)    SpO2: 100% 99% 99%         Electronically Signed By: Leelee Cruz MD, 2018, 12:03 PM

## 2018-12-15 LAB
FLUAV H1 2009 PAND RNA SPEC QL NAA+PROBE: NEGATIVE
FLUAV H1 RNA SPEC QL NAA+PROBE: NEGATIVE
FLUAV H3 RNA SPEC QL NAA+PROBE: NEGATIVE
FLUAV RNA SPEC QL NAA+PROBE: NEGATIVE
FLUBV RNA SPEC QL NAA+PROBE: NEGATIVE
HADV DNA SPEC QL NAA+PROBE: NEGATIVE
HADV DNA SPEC QL NAA+PROBE: NEGATIVE
HMPV RNA SPEC QL NAA+PROBE: NEGATIVE
HPIV1 RNA SPEC QL NAA+PROBE: NEGATIVE
HPIV2 RNA SPEC QL NAA+PROBE: NEGATIVE
HPIV3 RNA SPEC QL NAA+PROBE: NEGATIVE
MICROBIOLOGIST REVIEW: ABNORMAL
RHINOVIRUS RNA SPEC QL NAA+PROBE: POSITIVE
RSV RNA SPEC QL NAA+PROBE: NEGATIVE
RSV RNA SPEC QL NAA+PROBE: NEGATIVE
SPECIMEN SOURCE: ABNORMAL

## 2018-12-16 LAB
ACID FAST STN SPEC QL: NORMAL
CMV DNA SPEC NAA+PROBE-ACNC: 525 [IU]/ML
CMV DNA SPEC NAA+PROBE-LOG#: 2.7 {LOG_IU}/ML
SPECIMEN SOURCE: ABNORMAL
SPECIMEN SOURCE: NORMAL

## 2018-12-17 LAB
BACTERIA SPEC CULT: ABNORMAL
Lab: ABNORMAL
SPECIMEN SOURCE: ABNORMAL
SPECIMEN SOURCE: ABNORMAL

## 2018-12-17 NOTE — RESULT ENCOUNTER NOTE
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  Do we want to treat?  Magaly

## 2018-12-19 DIAGNOSIS — Z94.2 LUNG REPLACED BY TRANSPLANT (H): ICD-10-CM

## 2018-12-19 PROCEDURE — 80197 ASSAY OF TACROLIMUS: CPT | Performed by: INTERNAL MEDICINE

## 2018-12-20 DIAGNOSIS — Z94.2 LUNG TRANSPLANT RECIPIENT (H): Primary | ICD-10-CM

## 2018-12-20 LAB
TACROLIMUS BLD-MCNC: 7 UG/L (ref 5–15)
TME LAST DOSE: NORMAL H

## 2018-12-21 ENCOUNTER — TELEPHONE (OUTPATIENT)
Dept: TRANSPLANT | Facility: CLINIC | Age: 58
End: 2018-12-21

## 2018-12-21 DIAGNOSIS — Z94.2 S/P LUNG TRANSPLANT (H): ICD-10-CM

## 2018-12-21 DIAGNOSIS — J40 MORAXELLA CATARRHALIS BRONCHITIS: Primary | ICD-10-CM

## 2018-12-21 DIAGNOSIS — Z94.2 LUNG REPLACED BY TRANSPLANT (H): ICD-10-CM

## 2018-12-21 DIAGNOSIS — B96.89 MORAXELLA CATARRHALIS BRONCHITIS: Primary | ICD-10-CM

## 2018-12-21 RX ORDER — TACROLIMUS 1 MG/1
CAPSULE ORAL
Qty: 240 CAPSULE | Refills: 11 | Status: SHIPPED | OUTPATIENT
Start: 2018-12-21 | End: 2018-12-21

## 2018-12-21 RX ORDER — TACROLIMUS 1 MG/1
CAPSULE ORAL
Qty: 240 CAPSULE | Refills: 11 | Status: SHIPPED | OUTPATIENT
Start: 2018-12-21 | End: 2018-12-27

## 2018-12-21 RX ORDER — TACROLIMUS 0.5 MG/1
0.5 CAPSULE ORAL DAILY
Qty: 90 CAPSULE | Refills: 3 | Status: SHIPPED | OUTPATIENT
Start: 2018-12-21 | End: 2018-12-27

## 2018-12-21 RX ORDER — AMOXICILLIN AND CLAVULANATE POTASSIUM 500; 125 MG/1; MG/1
1 TABLET, FILM COATED ORAL 2 TIMES DAILY
Qty: 20 TABLET | Refills: 0 | Status: SHIPPED | OUTPATIENT
Start: 2018-12-21 | End: 2019-03-05

## 2018-12-21 NOTE — TELEPHONE ENCOUNTER
Per Dr Mcelroy give Augmentin 500mg BID x 10 days for moraxella catarrhalis positive lavage.   Patient notified and script sent.

## 2018-12-21 NOTE — TELEPHONE ENCOUNTER
Called pt to review her recent 11 hr FK level of 7.0 (goal 9 - 11). Pt verified her current Prograf dose at 4 mg bid. Pt was instructed to increase her Prograf dose to 4.5 mg starting tonight and to continue taking 4 mg every morning. Pt agreed to the plan, verbalized her new Prograf dose as 4 mg every am, and 4.5 mg every pm, starting tonight. Pt agreed to have another level checked in a week and will call with questions or concerns.

## 2018-12-22 LAB
MYCOBACTERIUM SPEC CULT: NORMAL
MYCOBACTERIUM SPEC CULT: NORMAL
SPECIMEN SOURCE: NORMAL

## 2018-12-26 ENCOUNTER — TELEPHONE (OUTPATIENT)
Dept: TRANSPLANT | Facility: CLINIC | Age: 58
End: 2018-12-26

## 2018-12-26 ENCOUNTER — TELEPHONE (OUTPATIENT)
Dept: PULMONOLOGY | Facility: CLINIC | Age: 58
End: 2018-12-26

## 2018-12-26 NOTE — TELEPHONE ENCOUNTER
Spoke with patient to schedule procedure with Dr. Alana Lin   Procedure was scheduled on 01/17 at [facility] OR  Patient will have H&P with [facility], Dr. Mcelroy  Patient is aware a / is needed day of surgery.   Surgery letter was sent via [employer], patient has my direct contact information for any further questions.

## 2018-12-26 NOTE — TELEPHONE ENCOUNTER
"Received call from Qing at [facility]. Qing would like to know if patient has a non-transplant care coordinator that oversees all of patient's medical needs. Qing would like to set patient up with a care coordinator through [facility] -- but was denied by insurance because patient already had an assigned \"coordinator\". Please call Qing at [phone] to discuss further.    "

## 2018-12-27 DIAGNOSIS — Z94.2 S/P LUNG TRANSPLANT (H): ICD-10-CM

## 2018-12-27 RX ORDER — TACROLIMUS 1 MG/1
4 CAPSULE ORAL 2 TIMES DAILY
Qty: 240 CAPSULE | Refills: 11 | Status: SHIPPED | OUTPATIENT
Start: 2018-12-27 | End: 2019-03-06

## 2018-12-27 RX ORDER — TACROLIMUS 0.5 MG/1
0.5 CAPSULE ORAL DAILY
Qty: 90 CAPSULE | Refills: 3 | Status: SHIPPED | OUTPATIENT
Start: 2018-12-27 | End: 2019-03-06

## 2018-12-27 NOTE — TELEPHONE ENCOUNTER
Returned Qing's call after speaking with patient (Kecia).  Kecia does not want another care coordinator at this time.  Insurance will not pay for 2 care coordinators.    As part of our protocols for lung transplant we assume primary care coordination for recipients for first year.  Any questions can return my call.

## 2019-01-02 DIAGNOSIS — Z94.2 LUNG REPLACED BY TRANSPLANT (H): ICD-10-CM

## 2019-01-02 PROCEDURE — 80197 ASSAY OF TACROLIMUS: CPT | Performed by: INTERNAL MEDICINE

## 2019-01-03 LAB
TACROLIMUS BLD-MCNC: 10.7 UG/L (ref 5–15)
TME LAST DOSE: NORMAL H

## 2019-01-04 NOTE — RESULT ENCOUNTER NOTE
Kecia,  Your Tacrolimus level is 10.7 at 12 hours and is at your goal range of 9-11. No dose adjustment is needed at this time. Please call if you have any questions or concerns.

## 2019-01-11 LAB
BACTERIA SPEC CULT: NORMAL
FUNGUS SPEC CULT: ABNORMAL
FUNGUS SPEC CULT: ABNORMAL
SPECIMEN SOURCE: ABNORMAL
SPECIMEN SOURCE: NORMAL

## 2019-01-16 ENCOUNTER — ANCILLARY PROCEDURE (OUTPATIENT)
Dept: GENERAL RADIOLOGY | Facility: CLINIC | Age: 59
End: 2019-01-16
Attending: INTERNAL MEDICINE
Payer: COMMERCIAL

## 2019-01-16 ENCOUNTER — OFFICE VISIT (OUTPATIENT)
Dept: TRANSPLANT | Facility: CLINIC | Age: 59
End: 2019-01-16
Attending: INTERNAL MEDICINE
Payer: COMMERCIAL

## 2019-01-16 VITALS
HEIGHT: 64 IN | HEART RATE: 55 BPM | SYSTOLIC BLOOD PRESSURE: 142 MMHG | DIASTOLIC BLOOD PRESSURE: 84 MMHG | OXYGEN SATURATION: 100 % | TEMPERATURE: 97.9 F | WEIGHT: 106 LBS | BODY MASS INDEX: 18.1 KG/M2

## 2019-01-16 DIAGNOSIS — Z94.2 LUNG REPLACED BY TRANSPLANT (H): ICD-10-CM

## 2019-01-16 DIAGNOSIS — Z94.2 STATUS POST LUNG TRANSPLANTATION (H): Primary | ICD-10-CM

## 2019-01-16 DIAGNOSIS — Z94.2 S/P LUNG TRANSPLANT (H): ICD-10-CM

## 2019-01-16 LAB
ALBUMIN SERPL-MCNC: 2.8 G/DL (ref 3.4–5)
ALP SERPL-CCNC: 65 U/L (ref 40–150)
ALT SERPL W P-5'-P-CCNC: 11 U/L (ref 0–50)
ANION GAP SERPL CALCULATED.3IONS-SCNC: 8 MMOL/L (ref 3–14)
AST SERPL W P-5'-P-CCNC: 12 U/L (ref 0–45)
BILIRUB SERPL-MCNC: 0.3 MG/DL (ref 0.2–1.3)
BUN SERPL-MCNC: 22 MG/DL (ref 7–30)
CALCIUM SERPL-MCNC: 8.8 MG/DL (ref 8.5–10.1)
CHLORIDE SERPL-SCNC: 101 MMOL/L (ref 94–109)
CO2 SERPL-SCNC: 25 MMOL/L (ref 20–32)
CREAT SERPL-MCNC: 1.25 MG/DL (ref 0.52–1.04)
ERYTHROCYTE [DISTWIDTH] IN BLOOD BY AUTOMATED COUNT: 17.2 % (ref 10–15)
EXPTIME-PRE: 6.93 SEC
FEF2575-%PRED-PRE: 20 %
FEF2575-PRE: 0.49 L/SEC
FEF2575-PRED: 2.42 L/SEC
FEFMAX-%PRED-PRE: 52 %
FEFMAX-PRE: 3.36 L/SEC
FEFMAX-PRED: 6.41 L/SEC
FEV1-%PRED-PRE: 40 %
FEV1-PRE: 1.04 L
FEV1FEV6-PRE: 54 %
FEV1FEV6-PRED: 81 %
FEV1FVC-PRE: 54 %
FEV1FVC-PRED: 79 %
FIFMAX-PRE: 2.05 L/SEC
FVC-%PRED-PRE: 59 %
FVC-PRE: 1.92 L
FVC-PRED: 3.23 L
GFR SERPL CREATININE-BSD FRML MDRD: 48 ML/MIN/{1.73_M2}
GLUCOSE SERPL-MCNC: 98 MG/DL (ref 70–99)
HCT VFR BLD AUTO: 30.7 % (ref 35–47)
HGB BLD-MCNC: 9.2 G/DL (ref 11.7–15.7)
MAGNESIUM SERPL-MCNC: 1.5 MG/DL (ref 1.6–2.3)
MCH RBC QN AUTO: 30.4 PG (ref 26.5–33)
MCHC RBC AUTO-ENTMCNC: 30 G/DL (ref 31.5–36.5)
MCV RBC AUTO: 101 FL (ref 78–100)
PLATELET # BLD AUTO: 385 10E9/L (ref 150–450)
POTASSIUM SERPL-SCNC: 4.3 MMOL/L (ref 3.4–5.3)
PROT SERPL-MCNC: 7.4 G/DL (ref 6.8–8.8)
RBC # BLD AUTO: 3.03 10E12/L (ref 3.8–5.2)
SODIUM SERPL-SCNC: 134 MMOL/L (ref 133–144)
TACROLIMUS BLD-MCNC: 9.5 UG/L (ref 5–15)
TME LAST DOSE: NORMAL H
WBC # BLD AUTO: 4.5 10E9/L (ref 4–11)

## 2019-01-16 PROCEDURE — 85027 COMPLETE CBC AUTOMATED: CPT | Performed by: INTERNAL MEDICINE

## 2019-01-16 PROCEDURE — G0463 HOSPITAL OUTPT CLINIC VISIT: HCPCS | Mod: ZF

## 2019-01-16 PROCEDURE — 83735 ASSAY OF MAGNESIUM: CPT | Performed by: INTERNAL MEDICINE

## 2019-01-16 PROCEDURE — 80053 COMPREHEN METABOLIC PANEL: CPT | Performed by: INTERNAL MEDICINE

## 2019-01-16 PROCEDURE — 80197 ASSAY OF TACROLIMUS: CPT | Performed by: INTERNAL MEDICINE

## 2019-01-16 PROCEDURE — 36415 COLL VENOUS BLD VENIPUNCTURE: CPT | Performed by: INTERNAL MEDICINE

## 2019-01-16 RX ORDER — AZATHIOPRINE 50 MG/1
TABLET ORAL
Qty: 1 TABLET | Refills: 0 | COMMUNITY
Start: 2019-01-16 | End: 2019-03-05

## 2019-01-16 RX ORDER — FERROUS SULFATE 325(65) MG
325 TABLET ORAL
Qty: 60 TABLET | Refills: 2 | Status: SHIPPED | OUTPATIENT
Start: 2019-01-16 | End: 2019-03-05

## 2019-01-16 ASSESSMENT — PAIN SCALES - GENERAL: PAINLEVEL: NO PAIN (0)

## 2019-01-16 ASSESSMENT — MIFFLIN-ST. JEOR: SCORE: 1047.87

## 2019-01-16 NOTE — NURSING NOTE
"Chief Complaint   Patient presents with     RECHECK     lung tx     /84   Pulse 55   Temp 97.9  F (36.6  C) (Oral)   Ht 1.613 m (5' 3.5\")   Wt 48.1 kg (106 lb)   LMP 06/07/2014 (Exact Date)   BMI 18.48 kg/m    Elli Anna MA    "

## 2019-01-16 NOTE — PATIENT INSTRUCTIONS
1. Call insurance company and ask how much it will cost for you to get Shingrix vaccine  2. Call your friend the physical therapist and see if she can help you. Let us know if you need a prescription  3. Coordinator will call you with today's lab results (tacro and CMV)  4. Your chest x-ray, PFT, and labs look great!  5.  prescription for iron pills (decreased to once a day)  6. We will arrange f/u visit after the bronch

## 2019-01-16 NOTE — PROGRESS NOTES
"    [facility]  Pulmonary Medicine/Lung Transplant  January 16, 2019       Today's visit note:       ASSESSMENT/PLAN:    1.  Status post bilateral lung transplant, performed on 03/01/2018 for interstitial lung disease associated with dermatomyositis, rheumatoid arthritis and pulmonary hypertension.  Her current immune suppressive medications are tacrolimus, azathioprine (dose recently decreased due to leukopenia), and prednisone.  These medications will be continued and adjusted according to our protocols.     2.  Right main bronchial stenosis.  Her most recent dilation was on 12/14/2018 and bronchoscopy will be repeated tomorrow.     3.  Infectious disease.  She is on Bactrim for PCP prophylaxis.  She is also on Valcyte 450 mg a day, following an episode of CMV reactivation in 08/2018.  Today's CMV PCR will be checked when available.       4.  History of [facility] \"increased risk\" donor.  Followup labs will be checked at her 1 year anniversary.     5.  Chronic kidney disease, with today's creatinine in her baseline range.     6.  Anemia, improved since starting iron in 12/2018.  Iron will be decreased to once a day and continued at least for a few more months.     7.  Leukopenia, improved after decreasing azathioprine dose.     8.  Early satiety and a low appetite, improved with Marinol and p.r.n. Zofran.     9.  Hypertension, under good control with low-dose metoprolol.     10.  Bilateral knee arthritis.  Today, she has crepitations in the right knee.     11.  Health care maintenance.  She has already had her flu vaccine for this season.  She got a DTaP booster on 12/13/2018.  She will check with her insurance company to see if they cover the cost of the ShingRix.     12.  Inactivity.  The patient has a friend who is a physical therapist and who can help her get into an aqua aerobics program near her home.   13.  History of right retinal vein occlusion, being followed by her personal " "ophthalmologist.      The patient will undergo bronchoscopy tomorrow.  She will be seen again in clinic in 1 month with pulmonary function tests, labs, chest x-ray and exam.     Please also refer to RN Transplant Coordinator note for additional information related to this visit.  PATIENT PROFILE AND TRANSPLANT HISTORY:  Current age:                    58 year old  Underlying lung disease: IIP: Nonspecific Interstitial Pneumonia    Transplant date(s):        3/1/2018 (Lung)  Transplant POD(s):        321  Lung transplant type(s): Bilateral sequential lung      Transplant coordinator:   Zeny Tony  Transplant provider(s):        Alex Servin    Active Patient Thresholds     Lab Low High Effective Since Comment    Tacrolimus Level 9 11 07/30/2018 7/30/18 AL increased viral loads          INTERVAL HISTORY:  --Most recent resulted PRA: 12/4/18, neg for DSA  --Most recent bronchoscopy: 12/14/18 with right mainstem and BI dilation  --Most recent Tbbx: 4/10/18, ISHLT A0B0    --Most recent lung transplant clinic visit: 12/4/18 ([facility])    --Interval clinic visits, test results, signif medical events (obtained by chart review and patient hx):  12/13/18: Hgb 6.5-->transfused one unit prbc  12/13/18: GI consult ([facility])  12/13/18: Ortho walk-in clinic for knee pain  12/14/18: Flex and rigid bronch: \"Pertinent findings include L side intact, right anastomosis stenotic at 3-4 mm.\"-->dilated right main and BI bronchi  12/21/18: \"Per Dr Mcelroy give Augmentin 500mg BID x 10 days for moraxella catarrhalis positive lavage.\"  12/21/18: Re-start azathioprine at 75mg daily (1/2 prior dose)    --Today:  .Ms. Blue returned to clinic today for a previously scheduled appointment; she was accompanied by her , Ranjan.  The patient reports that her breathing is very good and she is not having shortness of breath with her daily activities.  She does allow that her activities are quite limited and her  " "reports that she is \"not doing anything.\"  She does not have excessive cough or sputum production and is not having chest pain, hemoptysis or wheezing.      REVIEW OF SYSTEMS:     1.  Appetite is gradually improving.   2.  Hoarse voice has resolved.   3.  She is not having fever, chills or sweats.   4.  She has nausea and is taking Zofran 2-3 times per week.  She also is taking Marinol, which helps her appetite, and also helps her relax at night.  5.  She has continued right knee pain and has noticed \"squeaking\" in her right knee.   6.  She was diagnosed with right retinal vein occlusion approximately 3 months after transplant and had injection into her right eye before Christmas; she has some improvement in her vision in that eye since the injection.   7.  A complete review of systems was asked and was otherwise negative.     PHYSICAL EXAM:  Vitals:    01/16/19 1045   BP: 142/84   Pulse: 55   Temp: 97.9  F (36.6  C)   TempSrc: Oral   SpO2: 100%   Weight: 48.1 kg (106 lb)   Height: 1.613 m (5' 3.5\")     Constitutional:    Awake, alert and in no apparent distress   Eyes:    Anicteric, PERRL   ENT:    oral mucosa moist without lesions    Neck:    Supple without supraclavicular or cervical lymphadenopathy    Lungs:    Good air entry both lungs. Few crackles at lung bases. No rhonchi. No wheezes.    Cardiovascular:    Normal S1 and S2. No JVD, murmur, rub, chavez. RSR   Abdomen:    NABS, soft, nontender, nondistended. No HSM.    Musculoskeletal:    No edema.    Neurologic:    Alert and conversant.    Skin:    Warm, dry. No rash seen.          Data:     I reviewed all resulted lab tests and imaging studies.    Results for orders placed or performed in visit on 01/16/19   General PFT Lab (Please always keep checked)   Result Value Ref Range    FVC-Pred 3.23 L    FVC-Pre 1.92 L    FVC-%Pred-Pre 59 %    FEV1-Pre 1.04 L    FEV1-%Pred-Pre 40 %    FEV1FVC-Pred 79 %    FEV1FVC-Pre 54 %    FEFMax-Pred 6.41 L/sec    FEFMax-Pre " 3.36 L/sec    FEFMax-%Pred-Pre 52 %    FEF2575-Pred 2.42 L/sec    FEF2575-Pre 0.49 L/sec    EUY9019-%Pred-Pre 20 %    ExpTime-Pre 6.93 sec    FIFMax-Pre 2.05 L/sec    FEV1FEV6-Pred 81 %    FEV1FEV6-Pre 54 %       PULMONARY FUNCTION TEST INTERPRETATION:  Pulmonary function tests were reviewed and interpreted by me.  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  When compared with this patient's most recent previous tests, dated 12/4/2018, there have been small (not clinically significant) increases in both FEV1 and FVC.    STUDIES STILL PENDING AT THE TIME OF THIS NOTE:  Unresulted Labs Ordered in the Past 30 Days of this Admission     Date and Time Order Name Status Description    12/14/2018 1057 AFB Culture Non Blood Preliminary           Complexity indicators:    --immune compromised, on high-risk medications x   --organ transplant recipient x   --multiple organ transplant recipient    --active respiratory infection    --within one year of transplant; and/or within one month of hospitalization x   --chronic lung allograft dysfunction syndrome (CLAD, chronic rejection, or bronchiolitis obliterans syndrome)    --new medical problem addressed during this visit    --multiple active medical problems x   --admitted directly to hospital from this clinic visit    -->50% of this visit was spent in counseling and care coordination. If yes, total visit time was              Medications:     Current Outpatient Medications   Medication     ACETAMINOPHEN PO     albuterol (PROAIR HFA/PROVENTIL HFA/VENTOLIN HFA) 108 (90 Base) MCG/ACT Inhaler     azaTHIOprine (IMURAN) 50 MG tablet     calcium-vitamin D (CALTRATE) 600-400 MG-UNIT per tablet     ferrous sulfate (FEROSUL) 325 (65 Fe) MG tablet     guaiFENesin-codeine (ROBITUSSIN AC) 100-10 MG/5ML SOLN solution     magnesium oxide (MAG-OX) 400 MG tablet     metoprolol tartrate (LOPRESSOR) 25 MG tablet     multivitamin,  therapeutic with minerals (THERA-VIT-M) TABS tablet     ondansetron (ZOFRAN) 4 MG tablet     order for DME     order for DME     pantoprazole (PROTONIX) 40 MG EC tablet     pentoxifylline (TRENTAL) 400 MG CR tablet     predniSONE (DELTASONE) 5 MG tablet     sulfamethoxazole-trimethoprim (BACTRIM DS/SEPTRA DS) 800-160 MG per tablet     tacrolimus (GENERIC EQUIVALENT) 0.5 MG capsule     tacrolimus (GENERIC EQUIVALENT) 1 MG capsule     valGANciclovir (VALCYTE) 450 MG tablet     No current facility-administered medications for this visit.             Past Medical and Surgical History:     Past Medical History:   Diagnosis Date     Antisynthetase syndrome (H) 2014     Chronic cough      Chronic infection     recent C diff  8/18     Dehydration 8/1/2018     Dermatomyositis (H)      Dysplasia of cervix, low grade (ESTRADA 1)      ILD (interstitial lung disease) (H)      Osteopenia      PONV (postoperative nausea and vomiting)      Pulmonary hypertension (H)      Raynaud's disease      Seronegative rheumatoid arthritis (H)      Past Surgical History:   Procedure Laterality Date     BRONCHOSCOPY (RIGID OR FLEXIBLE), DIAGNOSTIC N/A 4/10/2018    Procedure: COMBINED BRONCHOSCOPY (RIGID OR FLEXIBLE), LAVAGE;;  Surgeon: Mariposa Donohue MD;  Location:  GI     BRONCHOSCOPY (RIGID OR FLEXIBLE), DILATE BRONCHUS / TRACHEA N/A 10/11/2018    Procedure: BRONCHOSCOPY (RIGID OR FLEXIBLE), DILATE BRONCHUS / TRACHEA;  Flexible And Rigid Bronchoscopy and Dilation;  Surgeon: Wilber Lin MD;  Location: [facility]     BRONCHOSCOPY FLEXIBLE N/A 3/13/2018    Procedure: BRONCHOSCOPY FLEXIBLE;  Flexible Bronchoscopy ;  Surgeon: Gissell Sanhcez MD;  Location: [facility] GI     BRONCHOSCOPY FLEXIBLE N/A 5/9/2018    Procedure: BRONCHOSCOPY FLEXIBLE;;  Surgeon: Wilber Lin MD;  Location:  GI     BRONCHOSCOPY FLEXIBLE AND RIGID N/A 9/10/2018    Procedure: BRONCHOSCOPY FLEXIBLE AND RIGID;  Flexible and Rigid Bronchoscopy with Balloon  Dilation, tissue debulking with cryo, and Right mainstem bronchus stent placement;  Surgeon: Wilber Lin MD;  Location: [facility]     BRONCHOSCOPY RIGID N/A 6/6/2018    Procedure: BRONCHOSCOPY RIGID;;  Surgeon: Lopez Macias MD;  Location: [facility]     BRONCHOSCOPY, DILATE BRONCHUS, STENT BRONCHUS, COMBINED N/A 6/11/2018    Procedure: COMBINED BRONCHOSCOPY, DILATE BRONCHUS, STENT BRONCHUS;  Flexible Bronchoscopy, Balloon Dilation, Bronchial Washings;  Surgeon: Wilber Lin MD;  [facility]: [facility]     BRONCHOSCOPY, DILATE BRONCHUS, STENT BRONCHUS, COMBINED Right 7/10/2018    Procedure: COMBINED BRONCHOSCOPY, DILATE BRONCHUS, STENT BRONCHUS;  Flexible Bronchoscopy, Balloon Dilation, Bronchial Washings  ;  Surgeon: Wilber Lin MD;  [facility]: [facility]     BRONCHOSCOPY, DILATE BRONCHUS, STENT BRONCHUS, COMBINED N/A 8/2/2018    Procedure: COMBINED BRONCHOSCOPY, DILATE BRONCHUS, STENT BRONCHUS;  Flexible Bronchoscopy, Bronchial Washings, Balloon Dilation;  Surgeon: Wilber Lin MD;  [facility]     BRONCHOSCOPY, DILATE BRONCHUS, STENT BRONCHUS, COMBINED N/A 8/20/2018    Procedure: COMBINED BRONCHOSCOPY, DILATE BRONCHUS, STENT BRONCHUS;  Flexible Bronchoscopy, Balloon Dilation;  Surgeon: Wilber Lin MD;  [facility]: [facility]     BRONCHOSCOPY, DILATE BRONCHUS, STENT BRONCHUS, COMBINED N/A 10/29/2018    Procedure: Flexible Bronchoscopy, Balloon Dilation, Stent Revision, Airway Examination And Therapeutic Suctioning, Cyro Tumor Debulking;  Surgeon: Wilber Lin MD;  Location: [facility]     BRONCHOSCOPY, DILATE BRONCHUS, STENT BRONCHUS, COMBINED N/A 11/20/2018    Procedure: Rigid Bronchoscopy, Stent Removal and dilitation;  Surgeon: Wilber Lin MD;  [facility]: [facility]     BRONCHOSCOPY, DILATE BRONCHUS, STENT BRONCHUS, COMBINED N/A 12/14/2018    Procedure: Flexible And Rigid Bronchoscopy, Balloon Dilation, Bronchial Washing;  Surgeon: Wilber Lin MD;   Location: [facility]     ENT SURGERY      tonsillectomy as a child     ESOPHAGOSCOPY, GASTROSCOPY, DUODENOSCOPY (EGD), COMBINED N/A 10/29/2018    Procedure: COMBINED ESOPHAGOSCOPY, GASTROSCOPY, DUODENOSCOPY (EGD) with biopsies and polypectomy;  Surgeon: Chente Bloom MD;  Location: [facility]     INSERT EXTRACORPORAL MEMBRANE OXYGENATOR ADULT (OUTSIDE OR) N/A 2/27/2018    Procedure: INSERT EXTRACORPORAL MEMBRANE OXYGENATOR ADULT (OUTSIDE OR);  INSERT EXTRACORPORAL MEMBRANE OXYGENATOR ADULT (OUTSIDE OR) ;  Surgeon: Toby Hernandez MD;  Location: [facility]     no prior surgery       REMOVE EXTRACORPORAL MEMBRANE OXYGENATOR ADULT N/A 3/3/2018    Procedure: REMOVE EXTRACORPORAL MEMBRANE OXYGENATOR ADULT;  Removal of Right Femoral Venous and Right Axillary Arterial Extracorporeal Membrane Oxygenator;  Surgeon: Toby Hernandez MD;  [facility]     TRANSPLANT LUNG RECIPIENT SINGLE X2 Bilateral 3/1/2018    Procedure: TRANSPLANT LUNG RECIPIENT SINGLE X2;  Median Sternotomy, Extracorporeal Membrane Oxygenator, bilateral sequential lung transplant;  Surgeon: Toby Hernandez MD;  [facility]           Family History:     Family History   Problem Relation Age of Onset     Hypertension Mother      Arthritis Mother      Pancreatic Cancer Father      Diabetes Father

## 2019-01-16 NOTE — LETTER
"1/16/2019      RE: Kecia Blue  [address] [place] [address]           [facility]  Pulmonary Medicine/Lung Transplant  January 16, 2019       Today's visit note:       ASSESSMENT/PLAN:    1.  Status post bilateral lung transplant, performed on 03/01/2018 for interstitial lung disease associated with dermatomyositis, rheumatoid arthritis and pulmonary hypertension.  Her current immune suppressive medications are tacrolimus, azathioprine (dose recently decreased due to leukopenia), and prednisone.  These medications will be continued and adjusted according to our protocols.     2.  Right main bronchial stenosis.  Her most recent dilation was on 12/14/2018 and bronchoscopy will be repeated tomorrow.     3.  Infectious disease.  She is on Bactrim for PCP prophylaxis.  She is also on Valcyte 450 mg a day, following an episode of CMV reactivation in 08/2018.  Today's CMV PCR will be checked when available.       4.  History of PHS \"increased risk\" donor.  Followup labs will be checked at her 1 year anniversary.     5.  Chronic kidney disease, with today's creatinine in her baseline range.     6.  Anemia, improved since starting iron in 12/2018.  Iron will be decreased to once a day and continued at least for a few more months.     7.  Leukopenia, improved after decreasing azathioprine dose.     8.  Early satiety and a low appetite, improved with Marinol and p.r.n. Zofran.     9.  Hypertension, under good control with low-dose metoprolol.     10.  Bilateral knee arthritis.  Today, she has crepitations in the right knee.     11.  Health care maintenance.  She has already had her flu vaccine for this season.  She got a DTaP booster on 12/13/2018.  She will check with her insurance company to see if they cover the cost of the ShingRix.     12.  Inactivity.  The patient has a friend who is a physical therapist and who can help her get into an aqua aerobics program near her " "home.   13.  History of right retinal vein occlusion, being followed by her personal ophthalmologist.      The patient will undergo bronchoscopy tomorrow.  She will be seen again in clinic in 1 month with pulmonary function tests, labs, chest x-ray and exam.     Please also refer to RN Transplant Coordinator note for additional information related to this visit.  PATIENT PROFILE AND TRANSPLANT HISTORY:  Current age:                    58 year old  Underlying lung disease: IIP: Nonspecific Interstitial Pneumonia    Transplant date(s):        3/1/2018 (Lung)  Transplant POD(s):        321  Lung transplant type(s): Bilateral sequential lung      Transplant coordinator:   Zeny Tony  Transplant provider(s):        Alex Servin    Active Patient Thresholds     Lab Low High Effective Since Comment    Tacrolimus Level 9 11 07/30/2018 7/30/18 AL increased viral loads          INTERVAL HISTORY:  --Most recent resulted PRA: 12/4/18, neg for DSA  --Most recent bronchoscopy: 12/14/18 with right mainstem and BI dilation  --Most recent Tbbx: 4/10/18, ISHLT A0B0    --Most recent lung transplant clinic visit: 12/4/18 ([facility])    --Interval clinic visits, test results, signif medical events (obtained by chart review and patient hx):  12/13/18: Hgb 6.5-->transfused one unit prbc  12/13/18: GI consult ([facility])  12/13/18: Ortho walk-in clinic for knee pain  12/14/18: Flex and rigid bronch: \"Pertinent findings include L side intact, right anastomosis stenotic at 3-4 mm.\"-->dilated right main and BI bronchi  12/21/18: \"Per Dr Mcelroy give Augmentin 500mg BID x 10 days for moraxella catarrhalis positive lavage.\"  12/21/18: Re-start azathioprine at 75mg daily (1/2 prior dose)    --Today:  .Ms. Blue returned to clinic today for a previously scheduled appointment; she was accompanied by her , Ranjan.  The patient reports that her breathing is very good and she is not having shortness of breath with her daily " "activities.  She does allow that her activities are quite limited and her  reports that she is \"not doing anything.\"  She does not have excessive cough or sputum production and is not having chest pain, hemoptysis or wheezing.      REVIEW OF SYSTEMS:     1.  Appetite is gradually improving.   2.  Hoarse voice has resolved.   3.  She is not having fever, chills or sweats.   4.  She has nausea and is taking Zofran 2-3 times per week.  She also is taking Marinol, which helps her appetite, and also helps her relax at night.  5.  She has continued right knee pain and has noticed \"squeaking\" in her right knee.   6.  She was diagnosed with right retinal vein occlusion approximately 3 months after transplant and had injection into her right eye before [place]; she has some improvement in her vision in that eye since the injection.   7.  A complete review of systems was asked and was otherwise negative.     PHYSICAL EXAM:  Vitals:    01/16/19 1045   BP: 142/84   Pulse: 55   Temp: 97.9  F (36.6  C)   TempSrc: Oral   SpO2: 100%   Weight: 48.1 kg (106 lb)   Height: 1.613 m (5' 3.5\")     Constitutional:    Awake, alert and in no apparent distress   Eyes:    Anicteric, PERRL   ENT:    oral mucosa moist without lesions    Neck:    Supple without supraclavicular or cervical lymphadenopathy    Lungs:    Good air entry both lungs. Few crackles at lung bases. No rhonchi. No wheezes.    Cardiovascular:    Normal S1 and S2. No JVD, murmur, rub, chavez. RSR   Abdomen:    NABS, soft, nontender, nondistended. No HSM.    Musculoskeletal:    No edema.    Neurologic:    Alert and conversant.    Skin:    Warm, dry. No rash seen.          Data:     I reviewed all resulted lab tests and imaging studies.    Results for orders placed or performed in visit on 01/16/19   General PFT Lab (Please always keep checked)   Result Value Ref Range    FVC-Pred 3.23 L    FVC-Pre 1.92 L    FVC-%Pred-Pre 59 %    FEV1-Pre 1.04 L    FEV1-%Pred-Pre 40 % "    FEV1FVC-Pred 79 %    FEV1FVC-Pre 54 %    FEFMax-Pred 6.41 L/sec    FEFMax-Pre 3.36 L/sec    FEFMax-%Pred-Pre 52 %    FEF2575-Pred 2.42 L/sec    FEF2575-Pre 0.49 L/sec    BDU0266-%Pred-Pre 20 %    ExpTime-Pre 6.93 sec    FIFMax-Pre 2.05 L/sec    FEV1FEV6-Pred 81 %    FEV1FEV6-Pre 54 %       PULMONARY FUNCTION TEST INTERPRETATION:  Pulmonary function tests were reviewed and interpreted by me.  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  When compared with this patient's most recent previous tests, dated 12/4/2018, there have been small (not clinically significant) increases in both FEV1 and FVC.    STUDIES STILL PENDING AT THE TIME OF THIS NOTE:  Unresulted Labs Ordered in the Past 30 Days of this Admission     Date and Time Order Name Status Description    12/14/2018 1057 AFB Culture Non Blood Preliminary           Complexity indicators:    --immune compromised, on high-risk medications x   --organ transplant recipient x   --multiple organ transplant recipient    --active respiratory infection    --within one year of transplant; and/or within one month of hospitalization x   --chronic lung allograft dysfunction syndrome (CLAD, chronic rejection, or bronchiolitis obliterans syndrome)    --new medical problem addressed during this visit    --multiple active medical problems x   --admitted directly to hospital from this clinic visit    -->50% of this visit was spent in counseling and care coordination. If yes, total visit time was              Medications:     Current Outpatient Medications   Medication     ACETAMINOPHEN PO     albuterol (PROAIR HFA/PROVENTIL HFA/VENTOLIN HFA) 108 (90 Base) MCG/ACT Inhaler     azaTHIOprine (IMURAN) 50 MG tablet     calcium-vitamin D (CALTRATE) 600-400 MG-UNIT per tablet     ferrous sulfate (FEROSUL) 325 (65 Fe) MG tablet     guaiFENesin-codeine (ROBITUSSIN AC) 100-10 MG/5ML SOLN solution     magnesium oxide (MAG-OX) 400 MG  tablet     metoprolol tartrate (LOPRESSOR) 25 MG tablet     multivitamin, therapeutic with minerals (THERA-VIT-M) TABS tablet     ondansetron (ZOFRAN) 4 MG tablet     order for DME     order for DME     pantoprazole (PROTONIX) 40 MG EC tablet     pentoxifylline (TRENTAL) 400 MG CR tablet     predniSONE (DELTASONE) 5 MG tablet     sulfamethoxazole-trimethoprim (BACTRIM DS/SEPTRA DS) 800-160 MG per tablet     tacrolimus (GENERIC EQUIVALENT) 0.5 MG capsule     tacrolimus (GENERIC EQUIVALENT) 1 MG capsule     valGANciclovir (VALCYTE) 450 MG tablet     No current facility-administered medications for this visit.             Past Medical and Surgical History:     Past Medical History:   Diagnosis Date     Antisynthetase syndrome (H) 2014     Chronic cough      Chronic infection     recent C diff  8/18     Dehydration 8/1/2018     Dermatomyositis (H)      Dysplasia of cervix, low grade (ESTRADA 1)      ILD (interstitial lung disease) (H)      Osteopenia      PONV (postoperative nausea and vomiting)      Pulmonary hypertension (H)      Raynaud's disease      Seronegative rheumatoid arthritis (H)      Past Surgical History:   Procedure Laterality Date     BRONCHOSCOPY (RIGID OR FLEXIBLE), DIAGNOSTIC N/A 4/10/2018    Procedure: COMBINED BRONCHOSCOPY (RIGID OR FLEXIBLE), LAVAGE;;  Surgeon: Mariposa Donohue MD;  Location:  GI     BRONCHOSCOPY (RIGID OR FLEXIBLE), DILATE BRONCHUS / TRACHEA N/A 10/11/2018    Procedure: BRONCHOSCOPY (RIGID OR FLEXIBLE), DILATE BRONCHUS / TRACHEA;  Flexible And Rigid Bronchoscopy and Dilation;  Surgeon: Wilber Lin MD;  [facility]     BRONCHOSCOPY FLEXIBLE N/A 3/13/2018    Procedure: BRONCHOSCOPY FLEXIBLE;  Flexible Bronchoscopy ;  Surgeon: Gissell Sanchez MD;  [facility]     BRONCHOSCOPY FLEXIBLE N/A 5/9/2018    Procedure: BRONCHOSCOPY FLEXIBLE;;  Surgeon: Wilber Lin MD;  Location:  GI     BRONCHOSCOPY FLEXIBLE AND RIGID N/A 9/10/2018    Procedure:  BRONCHOSCOPY FLEXIBLE AND RIGID;  Flexible and Rigid Bronchoscopy with Balloon Dilation, tissue debulking with cryo, and Right mainstem bronchus stent placement;  Surgeon: Wilber Lin MD;  [facility]     BRONCHOSCOPY RIGID N/A 6/6/2018    Procedure: BRONCHOSCOPY RIGID;;  Surgeon: Lopez Macias MD;  [facility]     BRONCHOSCOPY, DILATE BRONCHUS, STENT BRONCHUS, COMBINED N/A 6/11/2018    Procedure: COMBINED BRONCHOSCOPY, DILATE BRONCHUS, STENT BRONCHUS;  Flexible Bronchoscopy, Balloon Dilation, Bronchial Washings;  Surgeon: Wilber Lin MD;  [facility]     BRONCHOSCOPY, DILATE BRONCHUS, STENT BRONCHUS, COMBINED Right 7/10/2018    Procedure: COMBINED BRONCHOSCOPY, DILATE BRONCHUS, STENT BRONCHUS;  Flexible Bronchoscopy, Balloon Dilation, Bronchial Washings  ;  Surgeon: Wilber Lin MD;  [facility]     BRONCHOSCOPY, DILATE BRONCHUS, STENT BRONCHUS, COMBINED N/A 8/2/2018    Procedure: COMBINED BRONCHOSCOPY, DILATE BRONCHUS, STENT BRONCHUS;  Flexible Bronchoscopy, Bronchial Washings, Balloon Dilation;  Surgeon: Wilber Lin MD;  [facility]     BRONCHOSCOPY, DILATE BRONCHUS, STENT BRONCHUS, COMBINED N/A 8/20/2018    Procedure: COMBINED BRONCHOSCOPY, DILATE BRONCHUS, STENT BRONCHUS;  Flexible Bronchoscopy, Balloon Dilation;  Surgeon: Wilber Lin MD;  [facility]     BRONCHOSCOPY, DILATE BRONCHUS, STENT BRONCHUS, COMBINED N/A 10/29/2018    Procedure: Flexible Bronchoscopy, Balloon Dilation, Stent Revision, Airway Examination And Therapeutic Suctioning, Cyro Tumor Debulking;  Surgeon: Wilber Lin MD;  [facility]     BRONCHOSCOPY, DILATE BRONCHUS, STENT BRONCHUS, COMBINED N/A 11/20/2018    Procedure: Rigid Bronchoscopy, Stent Removal and dilitation;  Surgeon: Wilber Lin MD;  [facility]     BRONCHOSCOPY, DILATE BRONCHUS, STENT BRONCHUS, COMBINED N/A 12/14/2018    Procedure: Flexible And Rigid Bronchoscopy,  Balloon Dilation, Bronchial Washing;  Surgeon: Wilber Lin MD;  [facility]     ENT SURGERY      tonsillectomy as a child     ESOPHAGOSCOPY, GASTROSCOPY, DUODENOSCOPY (EGD), COMBINED N/A 10/29/2018    Procedure: COMBINED ESOPHAGOSCOPY, GASTROSCOPY, DUODENOSCOPY (EGD) with biopsies and polypectomy;  Surgeon: Chente Bloom MD;  [facility]     INSERT EXTRACORPORAL MEMBRANE OXYGENATOR ADULT (OUTSIDE OR) N/A 2/27/2018    Procedure: INSERT EXTRACORPORAL MEMBRANE OXYGENATOR ADULT (OUTSIDE OR);  INSERT EXTRACORPORAL MEMBRANE OXYGENATOR ADULT (OUTSIDE OR) ;  Surgeon: Toby Hernandez MD;  [facility]     no prior surgery       REMOVE EXTRACORPORAL MEMBRANE OXYGENATOR ADULT N/A 3/3/2018    Procedure: REMOVE EXTRACORPORAL MEMBRANE OXYGENATOR ADULT;  Removal of Right Femoral Venous and Right Axillary Arterial Extracorporeal Membrane Oxygenator;  Surgeon: Toby Hernandez MD;  [facility]     TRANSPLANT LUNG RECIPIENT SINGLE X2 Bilateral 3/1/2018    Procedure: TRANSPLANT LUNG RECIPIENT SINGLE X2;  Median Sternotomy, Extracorporeal Membrane Oxygenator, bilateral sequential lung transplant;  Surgeon: Toby Hernandez MD;  [facility]           Family History:     Family History   Problem Relation Age of Onset     Hypertension Mother      Arthritis Mother      Pancreatic Cancer Father      Diabetes Father          Srinivas Mcelroy MD

## 2019-01-17 ENCOUNTER — HOSPITAL ENCOUNTER (OUTPATIENT)
Facility: CLINIC | Age: 59
Discharge: HOME | End: 2019-01-17
Attending: INTERNAL MEDICINE | Admitting: INTERNAL MEDICINE
Payer: COMMERCIAL

## 2019-01-17 ENCOUNTER — ANESTHESIA (OUTPATIENT)
Dept: SURGERY | Facility: CLINIC | Age: 59
End: 2019-01-17
Payer: COMMERCIAL

## 2019-01-17 ENCOUNTER — ANESTHESIA EVENT (OUTPATIENT)
Dept: SURGERY | Facility: CLINIC | Age: 59
End: 2019-01-17
Payer: COMMERCIAL

## 2019-01-17 VITALS
TEMPERATURE: 98.3 F | OXYGEN SATURATION: 98 % | SYSTOLIC BLOOD PRESSURE: 128 MMHG | HEART RATE: 78 BPM | RESPIRATION RATE: 16 BRPM | WEIGHT: 103.84 LBS | DIASTOLIC BLOOD PRESSURE: 85 MMHG | BODY MASS INDEX: 17.73 KG/M2 | HEIGHT: 64 IN

## 2019-01-17 LAB
APPEARANCE FLD: NORMAL
CMV DNA SPEC NAA+PROBE-ACNC: <137 [IU]/ML
CMV DNA SPEC NAA+PROBE-LOG#: <2.1 {LOG_IU}/ML
COLOR FLD: NORMAL
COPATH REPORT: NORMAL
EOSINOPHIL NFR FLD MANUAL: 1 %
GLUCOSE BLDC GLUCOMTR-MCNC: 90 MG/DL (ref 70–99)
GRAM STN SPEC: NORMAL
GRAM STN SPEC: NORMAL
KOH PREP SPEC: NORMAL
MONOS+MACROS NFR FLD MANUAL: 12 %
NEUTS BAND NFR FLD MANUAL: 87 %
SPECIMEN SOURCE FLD: NORMAL
SPECIMEN SOURCE: ABNORMAL
SPECIMEN SOURCE: NORMAL
SPECIMEN SOURCE: NORMAL
WBC # FLD AUTO: 1500 /UL

## 2019-01-17 PROCEDURE — 71000016 ZZH RECOVERY PHASE 1 LEVEL 3 FIRST HR: Performed by: INTERNAL MEDICINE

## 2019-01-17 PROCEDURE — 87210 SMEAR WET MOUNT SALINE/INK: CPT | Performed by: INTERNAL MEDICINE

## 2019-01-17 PROCEDURE — 27210794 ZZH OR GENERAL SUPPLY STERILE: Performed by: INTERNAL MEDICINE

## 2019-01-17 PROCEDURE — 88108 CYTOPATH CONCENTRATE TECH: CPT | Performed by: INTERNAL MEDICINE

## 2019-01-17 PROCEDURE — 37000009 ZZH ANESTHESIA TECHNICAL FEE, EACH ADDTL 15 MIN: Performed by: INTERNAL MEDICINE

## 2019-01-17 PROCEDURE — 71000027 ZZH RECOVERY PHASE 2 EACH 15 MINS: Performed by: INTERNAL MEDICINE

## 2019-01-17 PROCEDURE — 87081 CULTURE SCREEN ONLY: CPT | Performed by: INTERNAL MEDICINE

## 2019-01-17 PROCEDURE — C1726 CATH, BAL DIL, NON-VASCULAR: HCPCS | Performed by: INTERNAL MEDICINE

## 2019-01-17 PROCEDURE — 25000128 H RX IP 250 OP 636: Performed by: NURSE ANESTHETIST, CERTIFIED REGISTERED

## 2019-01-17 PROCEDURE — 88312 SPECIAL STAINS GROUP 1: CPT | Performed by: INTERNAL MEDICINE

## 2019-01-17 PROCEDURE — 82962 GLUCOSE BLOOD TEST: CPT

## 2019-01-17 PROCEDURE — 87070 CULTURE OTHR SPECIMN AEROBIC: CPT | Performed by: INTERNAL MEDICINE

## 2019-01-17 PROCEDURE — 89051 BODY FLUID CELL COUNT: CPT | Performed by: INTERNAL MEDICINE

## 2019-01-17 PROCEDURE — 87015 SPECIMEN INFECT AGNT CONCNTJ: CPT | Performed by: INTERNAL MEDICINE

## 2019-01-17 PROCEDURE — 87633 RESP VIRUS 12-25 TARGETS: CPT | Performed by: INTERNAL MEDICINE

## 2019-01-17 PROCEDURE — 87206 SMEAR FLUORESCENT/ACID STAI: CPT | Performed by: INTERNAL MEDICINE

## 2019-01-17 PROCEDURE — 25000125 ZZHC RX 250: Performed by: NURSE ANESTHETIST, CERTIFIED REGISTERED

## 2019-01-17 PROCEDURE — 36000059 ZZH SURGERY LEVEL 3 EA 15 ADDTL MIN UMMC: Performed by: INTERNAL MEDICINE

## 2019-01-17 PROCEDURE — 87116 MYCOBACTERIA CULTURE: CPT | Performed by: INTERNAL MEDICINE

## 2019-01-17 PROCEDURE — 37000008 ZZH ANESTHESIA TECHNICAL FEE, 1ST 30 MIN: Performed by: INTERNAL MEDICINE

## 2019-01-17 PROCEDURE — 87102 FUNGUS ISOLATION CULTURE: CPT | Performed by: INTERNAL MEDICINE

## 2019-01-17 PROCEDURE — 87205 SMEAR GRAM STAIN: CPT | Performed by: INTERNAL MEDICINE

## 2019-01-17 PROCEDURE — 87076 CULTURE ANAEROBE IDENT EACH: CPT | Performed by: INTERNAL MEDICINE

## 2019-01-17 PROCEDURE — 40000170 ZZH STATISTIC PRE-PROCEDURE ASSESSMENT II: Performed by: INTERNAL MEDICINE

## 2019-01-17 PROCEDURE — 36000057 ZZH SURGERY LEVEL 3 1ST 30 MIN - UMMC: Performed by: INTERNAL MEDICINE

## 2019-01-17 RX ORDER — FENTANYL CITRATE 50 UG/ML
INJECTION, SOLUTION INTRAMUSCULAR; INTRAVENOUS PRN
Status: DISCONTINUED | OUTPATIENT
Start: 2019-01-17 | End: 2019-01-17

## 2019-01-17 RX ORDER — PROPOFOL 10 MG/ML
INJECTION, EMULSION INTRAVENOUS PRN
Status: DISCONTINUED | OUTPATIENT
Start: 2019-01-17 | End: 2019-01-17

## 2019-01-17 RX ORDER — NALOXONE HYDROCHLORIDE 0.4 MG/ML
.1-.4 INJECTION, SOLUTION INTRAMUSCULAR; INTRAVENOUS; SUBCUTANEOUS
Status: DISCONTINUED | OUTPATIENT
Start: 2019-01-17 | End: 2019-01-17 | Stop reason: HOSPADM

## 2019-01-17 RX ORDER — ONDANSETRON 4 MG/1
4 TABLET, ORALLY DISINTEGRATING ORAL EVERY 30 MIN PRN
Status: DISCONTINUED | OUTPATIENT
Start: 2019-01-17 | End: 2019-01-17 | Stop reason: HOSPADM

## 2019-01-17 RX ORDER — LIDOCAINE 40 MG/G
CREAM TOPICAL
Status: DISCONTINUED | OUTPATIENT
Start: 2019-01-17 | End: 2019-01-17 | Stop reason: HOSPADM

## 2019-01-17 RX ORDER — SODIUM CHLORIDE, SODIUM LACTATE, POTASSIUM CHLORIDE, CALCIUM CHLORIDE 600; 310; 30; 20 MG/100ML; MG/100ML; MG/100ML; MG/100ML
INJECTION, SOLUTION INTRAVENOUS CONTINUOUS
Status: DISCONTINUED | OUTPATIENT
Start: 2019-01-17 | End: 2019-01-17 | Stop reason: HOSPADM

## 2019-01-17 RX ORDER — ONDANSETRON 2 MG/ML
4 INJECTION INTRAMUSCULAR; INTRAVENOUS EVERY 30 MIN PRN
Status: DISCONTINUED | OUTPATIENT
Start: 2019-01-17 | End: 2019-01-17 | Stop reason: HOSPADM

## 2019-01-17 RX ORDER — LIDOCAINE HYDROCHLORIDE 10 MG/ML
INJECTION, SOLUTION INFILTRATION; PERINEURAL PRN
Status: DISCONTINUED | OUTPATIENT
Start: 2019-01-17 | End: 2019-01-17

## 2019-01-17 RX ORDER — ONDANSETRON 2 MG/ML
INJECTION INTRAMUSCULAR; INTRAVENOUS PRN
Status: DISCONTINUED | OUTPATIENT
Start: 2019-01-17 | End: 2019-01-17

## 2019-01-17 RX ORDER — DEXAMETHASONE SODIUM PHOSPHATE 4 MG/ML
INJECTION, SOLUTION INTRA-ARTICULAR; INTRALESIONAL; INTRAMUSCULAR; INTRAVENOUS; SOFT TISSUE PRN
Status: DISCONTINUED | OUTPATIENT
Start: 2019-01-17 | End: 2019-01-17

## 2019-01-17 RX ORDER — FENTANYL CITRATE 50 UG/ML
25-50 INJECTION, SOLUTION INTRAMUSCULAR; INTRAVENOUS
Status: DISCONTINUED | OUTPATIENT
Start: 2019-01-17 | End: 2019-01-17 | Stop reason: HOSPADM

## 2019-01-17 RX ORDER — MEPERIDINE HYDROCHLORIDE 50 MG/ML
12.5 INJECTION INTRAMUSCULAR; INTRAVENOUS; SUBCUTANEOUS
Status: DISCONTINUED | OUTPATIENT
Start: 2019-01-17 | End: 2019-01-17 | Stop reason: HOSPADM

## 2019-01-17 RX ORDER — SODIUM CHLORIDE, SODIUM LACTATE, POTASSIUM CHLORIDE, CALCIUM CHLORIDE 600; 310; 30; 20 MG/100ML; MG/100ML; MG/100ML; MG/100ML
INJECTION, SOLUTION INTRAVENOUS CONTINUOUS PRN
Status: DISCONTINUED | OUTPATIENT
Start: 2019-01-17 | End: 2019-01-17

## 2019-01-17 RX ORDER — PROPOFOL 10 MG/ML
INJECTION, EMULSION INTRAVENOUS CONTINUOUS PRN
Status: DISCONTINUED | OUTPATIENT
Start: 2019-01-17 | End: 2019-01-17

## 2019-01-17 RX ADMIN — PROPOFOL 150 MCG/KG/MIN: 10 INJECTION, EMULSION INTRAVENOUS at 11:50

## 2019-01-17 RX ADMIN — PROPOFOL 130 MG: 10 INJECTION, EMULSION INTRAVENOUS at 11:50

## 2019-01-17 RX ADMIN — FENTANYL CITRATE 100 MCG: 50 INJECTION, SOLUTION INTRAMUSCULAR; INTRAVENOUS at 11:50

## 2019-01-17 RX ADMIN — SODIUM CHLORIDE, POTASSIUM CHLORIDE, SODIUM LACTATE AND CALCIUM CHLORIDE: 600; 310; 30; 20 INJECTION, SOLUTION INTRAVENOUS at 11:28

## 2019-01-17 RX ADMIN — ONDANSETRON 4 MG: 2 INJECTION INTRAMUSCULAR; INTRAVENOUS at 11:53

## 2019-01-17 RX ADMIN — MIDAZOLAM 2 MG: 1 INJECTION INTRAMUSCULAR; INTRAVENOUS at 11:30

## 2019-01-17 RX ADMIN — LIDOCAINE HYDROCHLORIDE 100 MG: 10 INJECTION, SOLUTION INFILTRATION; PERINEURAL at 11:50

## 2019-01-17 RX ADMIN — SUGAMMADEX 200 MG: 100 INJECTION, SOLUTION INTRAVENOUS at 12:05

## 2019-01-17 RX ADMIN — ROCURONIUM BROMIDE 10 MG: 10 INJECTION INTRAVENOUS at 11:54

## 2019-01-17 RX ADMIN — DEXAMETHASONE SODIUM PHOSPHATE 8 MG: 4 INJECTION, SOLUTION INTRA-ARTICULAR; INTRALESIONAL; INTRAMUSCULAR; INTRAVENOUS; SOFT TISSUE at 11:53

## 2019-01-17 RX ADMIN — ROCURONIUM BROMIDE 30 MG: 10 INJECTION INTRAVENOUS at 11:50

## 2019-01-17 ASSESSMENT — MIFFLIN-ST. JEOR: SCORE: 1046

## 2019-01-17 NOTE — DISCHARGE INSTRUCTIONS
Post Bronchoscopy Patient Instructions:    2019  Kecia VILLAFNAA Satkharivickiedavid    Your procedure was completed  without any immediate complications.    You may cough up scant amount of blood for the next 12 hours. If you have excessive cough with blood, chest pain, shortness of breath, please report to the closest emergency room.    You may experience low grade (less than 100.5 F) fever next 24 hours, if so you can take tylenol. If the fever persists more than 24 hours contact to our office or your primary care provider.    May resume your regular diet as it was prior to procedure.    Should you have any question, please do not hesitate to call our office.    DA Lin MD      [facility], [place]  Same-Day Surgery   Adult Discharge Orders & Instructions     For 24 hours after surgery    1. Get plenty of rest.  A responsible adult must stay with you for at least 24 hours after you leave the hospital.   2. Do not drive or use heavy equipment.  If you have weakness or tingling, don't drive or use heavy equipment until this feeling goes away.  3. Do not drink alcohol.  4. Avoid strenuous or risky activities.  Ask for help when climbing stairs.   5. You may feel lightheaded.  IF so, sit for a few minutes before standing.  Have someone help you get up.   6. If you have nausea (feel sick to your stomach): Drink only clear liquids such as apple juice, ginger ale, broth or 7-Up.  Rest may also help.  Be sure to drink enough fluids.  Move to a regular diet as you feel able.  7. You may have a slight fever. Call the doctor if your fever is over 100 F (37.7 C) (taken under the tongue) or lasts longer than 24 hours.  8. You may have a dry mouth, a sore throat, muscle aches or trouble sleeping.  These should go away after 24 hours.  9. Do not make important or legal decisions.   Call your doctor for any of the followin.  Signs of infection (fever, growing tenderness at the surgery site,  a large amount of drainage or bleeding, severe pain, foul-smelling drainage, redness, swelling).    2. It has been over 8 to 10 hours since surgery and you are still not able to urinate (pass water).    3.  Headache for over 24 hours.      To contact a doctor, call _Dr. Lin's office at [phone]  __ or:        [phone] and ask for the resident on call for   __Pulmonology__ (answered 24 hours a day)      Emergency Department:    [facility]: [phone]       (TTY for hearing impaired: [phone])    [facility]: [phone]       (TTY for hearing impaired: [phone])

## 2019-01-17 NOTE — ANESTHESIA POSTPROCEDURE EVALUATION
Anesthesia POST Procedure Evaluation    Patient: Kecia Blue   MRN:     [id number] Gender:   female   Age:    58 year old :      1960        Preoperative Diagnosis: Lung Transplant Recipient   Procedure(s):  Flexible And Rigid Bronchoscopy, Balloon Dilation.   Postop Comments: No value filed.       Anesthesia Type:  No value filed.    Reportable Event: NO     PAIN: Uncomplicated   Sign Out status: Comfortable, Well controlled pain     PONV: No PONV   Sign Out status:  No Nausea or Vomiting     Neuro/Psych: Uneventful perioperative course   Sign Out Status: Preoperative baseline; Age appropriate mentation     Airway/Resp.: Uneventful perioperative course   Sign Out Status: Non labored breathing, age appropriate RR; Resp. Status within EXPECTED Parameters     CV: Uneventful perioperative course   Sign Out status: Appropriate BP and perfusion indices; Appropriate HR/Rhythm     Disposition:   Sign Out in:  PACU  Disposition:  Home  Recovery Course: Uneventful  Follow-Up: Not required           Last Anesthesia Record Vitals:  CRNA VITALS  2019 1147 - 2019 1247      2019             EKG:  Sinus rhythm          Last PACU/Preop Vitals:  Vitals:    19 1230 19 1245 19 1300   BP: (!) 124/91 125/81 125/84   Pulse: 86  83   Resp: 20 24 22   Temp:   36.8  C (98.2  F)   SpO2: 96% 96% 96%         Electronically Signed By: Chano Banks MD, 2019, 1:03 PM

## 2019-01-17 NOTE — ANESTHESIA PREPROCEDURE EVALUATION
Anesthesia Pre-Procedure Evaluation    Patient: Kecia Blue   MRN:     [id number] Gender:   female   Age:    58 year old :      1960        Preoperative Diagnosis: Lung Transplant Recipient   Procedure(s):  Flexible And Rigid Bronchoscopy, Balloon Dilation.     Past Medical History:   Diagnosis Date     Antisynthetase syndrome (H)      Chronic cough      Chronic infection     recent C diff       Dehydration 2018     Dermatomyositis (H)      Dysplasia of cervix, low grade (ESTRADA 1)      ILD (interstitial lung disease) (H)      Osteopenia      PONV (postoperative nausea and vomiting)      Pulmonary hypertension (H)      Raynaud's disease      Seronegative rheumatoid arthritis (H)       Past Surgical History:   Procedure Laterality Date     BRONCHOSCOPY (RIGID OR FLEXIBLE), DIAGNOSTIC N/A 4/10/2018    Procedure: COMBINED BRONCHOSCOPY (RIGID OR FLEXIBLE), LAVAGE;;  Surgeon: Mariposa Donohue MD;  [facility]     BRONCHOSCOPY (RIGID OR FLEXIBLE), DILATE BRONCHUS / TRACHEA N/A 10/11/2018    Procedure: BRONCHOSCOPY (RIGID OR FLEXIBLE), DILATE BRONCHUS / TRACHEA;  Flexible And Rigid Bronchoscopy and Dilation;  Surgeon: Wilber Lin MD;  [facility]     BRONCHOSCOPY FLEXIBLE N/A 3/13/2018    Procedure: BRONCHOSCOPY FLEXIBLE;  Flexible Bronchoscopy ;  Surgeon: Gissell Sanchez MD;  [facility]     BRONCHOSCOPY FLEXIBLE N/A 2018    Procedure: BRONCHOSCOPY FLEXIBLE;;  Surgeon: Wilber Lin MD;  [facility]     BRONCHOSCOPY FLEXIBLE AND RIGID N/A 9/10/2018    Procedure: BRONCHOSCOPY FLEXIBLE AND RIGID;  Flexible and Rigid Bronchoscopy with Balloon Dilation, tissue debulking with cryo, and Right mainstem bronchus stent placement;  Surgeon: Wilber Lin MD;  [facility]     BRONCHOSCOPY RIGID N/A 2018    Procedure: BRONCHOSCOPY RIGID;;  Surgeon: Lopez Macias MD;  [facility]     BRONCHOSCOPY, DILATE BRONCHUS, STENT BRONCHUS,  COMBINED N/A 6/11/2018    Procedure: COMBINED BRONCHOSCOPY, DILATE BRONCHUS, STENT BRONCHUS;  Flexible Bronchoscopy, Balloon Dilation, Bronchial Washings;  Surgeon: Wilber Lin MD;  [facility]     BRONCHOSCOPY, DILATE BRONCHUS, STENT BRONCHUS, COMBINED Right 7/10/2018    Procedure: COMBINED BRONCHOSCOPY, DILATE BRONCHUS, STENT BRONCHUS;  Flexible Bronchoscopy, Balloon Dilation, Bronchial Washings  ;  Surgeon: Wilber Lin MD;  [facility]     BRONCHOSCOPY, DILATE BRONCHUS, STENT BRONCHUS, COMBINED N/A 8/2/2018    Procedure: COMBINED BRONCHOSCOPY, DILATE BRONCHUS, STENT BRONCHUS;  Flexible Bronchoscopy, Bronchial Washings, Balloon Dilation;  Surgeon: Wilber Lin MD;  [facility]     BRONCHOSCOPY, DILATE BRONCHUS, STENT BRONCHUS, COMBINED N/A 8/20/2018    Procedure: COMBINED BRONCHOSCOPY, DILATE BRONCHUS, STENT BRONCHUS;  Flexible Bronchoscopy, Balloon Dilation;  Surgeon: Wilber Lin MD;  [facility]     BRONCHOSCOPY, DILATE BRONCHUS, STENT BRONCHUS, COMBINED N/A 10/29/2018    Procedure: Flexible Bronchoscopy, Balloon Dilation, Stent Revision, Airway Examination And Therapeutic Suctioning, Cyro Tumor Debulking;  Surgeon: Wilber Lin MD;  [facility]     BRONCHOSCOPY, DILATE BRONCHUS, STENT BRONCHUS, COMBINED N/A 11/20/2018    Procedure: Rigid Bronchoscopy, Stent Removal and dilitation;  Surgeon: Wilber Lin MD;  [facility]     BRONCHOSCOPY, DILATE BRONCHUS, STENT BRONCHUS, COMBINED N/A 12/14/2018    Procedure: Flexible And Rigid Bronchoscopy, Balloon Dilation, Bronchial Washing;  Surgeon: Wilber Lin MD;  [facility]     ENT SURGERY      tonsillectomy as a child     ESOPHAGOSCOPY, GASTROSCOPY, DUODENOSCOPY (EGD), COMBINED N/A 10/29/2018    Procedure: COMBINED ESOPHAGOSCOPY, GASTROSCOPY, DUODENOSCOPY (EGD) with biopsies and polypectomy;  Surgeon: Chente Bloom MD;  Location:  OR     INSERT  EXTRACORPORAL MEMBRANE OXYGENATOR ADULT (OUTSIDE OR) N/A 2/27/2018    Procedure: INSERT EXTRACORPORAL MEMBRANE OXYGENATOR ADULT (OUTSIDE OR);  INSERT EXTRACORPORAL MEMBRANE OXYGENATOR ADULT (OUTSIDE OR) ;  Surgeon: Toby Hernandez MD;  Location:  OR     no prior surgery       REMOVE EXTRACORPORAL MEMBRANE OXYGENATOR ADULT N/A 3/3/2018    Procedure: REMOVE EXTRACORPORAL MEMBRANE OXYGENATOR ADULT;  Removal of Right Femoral Venous and Right Axillary Arterial Extracorporeal Membrane Oxygenator;  Surgeon: Toby Hernandez MD;  Location:  OR     TRANSPLANT LUNG RECIPIENT SINGLE X2 Bilateral 3/1/2018    Procedure: TRANSPLANT LUNG RECIPIENT SINGLE X2;  Median Sternotomy, Extracorporeal Membrane Oxygenator, bilateral sequential lung transplant;  Surgeon: Toby Hernandez MD;  Location:  OR               [facility] AN PHYSICAL EXAM    Lab Results   Component Value Date    WBC 4.5 01/16/2019    HGB 9.2 (L) 01/16/2019    HCT 30.7 (L) 01/16/2019     01/16/2019     01/16/2019    POTASSIUM 4.3 01/16/2019    CHLORIDE 101 01/16/2019    CO2 25 01/16/2019    BUN 22 01/16/2019    CR 1.25 (H) 01/16/2019    GLC 98 01/16/2019    CHELSEY 8.8 01/16/2019    PHOS 2.6 08/03/2018    MAG 1.5 (L) 01/16/2019    ALBUMIN 2.8 (L) 01/16/2019    PROTTOTAL 7.4 01/16/2019    ALT 11 01/16/2019    AST 12 01/16/2019    ALKPHOS 65 01/16/2019    BILITOTAL 0.3 01/16/2019    AMYLASE 58 02/19/2018    PTT 26 08/03/2018    INR 1.02 12/13/2018    FIBR 279 03/05/2018    TSH 1.80 08/01/2018       Preop Vitals  BP Readings from Last 3 Encounters:   01/17/19 125/84   01/16/19 142/84   12/14/18 133/83    Pulse Readings from Last 3 Encounters:   01/17/19 83   01/16/19 55   12/14/18 89      Resp Readings from Last 3 Encounters:   01/17/19 22   12/14/18 18   12/13/18 16    SpO2 Readings from Last 3 Encounters:   01/17/19 96%   01/16/19 100%   12/14/18 99%      Temp Readings from Last 1 Encounters:   01/17/19 36.8  C (98.2  F)  "(Oral)    Ht Readings from Last 1 Encounters:   01/17/19 1.626 m (5' 4\")      Wt Readings from Last 1 Encounters:   01/17/19 47.1 kg (103 lb 13.4 oz)    Estimated body mass index is 17.82 kg/m  as calculated from the following:    Height as of this encounter: 1.626 m (5' 4\").    Weight as of this encounter: 47.1 kg (103 lb 13.4 oz).     LDA:  Peripheral IV 01/17/19 Left Lower forearm (Active)   Site Assessment WDL 1/17/2019 12:21 PM   Line Status Saline locked 1/17/2019 12:21 PM   Phlebitis Scale 0-->no symptoms 1/17/2019 12:21 PM   Infiltration Scale 0 1/17/2019 12:21 PM   Number of days: 0            Assessment:   ASA SCORE: 3    NPO Status: > 6 hours since completed Solid Foods   Documentation: H&P complete; Preop Testing complete; Consents complete   Proceeding: Proceed without further delay  Tobacco Use:  Active user of Tobacco     Plan:   Anes. Type:  General   Pre-Induction: Midazolam IV; Acetaminophen PO   Induction:  IV (Standard)   Airway: Oral ETT   Access/Monitoring: PIV   Maintenance: Balanced   Emergence: Procedure Site   Logistics: Same Day Surgery     Postop Pain/Sedation Strategy:  Standard-Options: Opioids PRN     PONV Management:  Adult Risk Factors: Female, H/o PONV or Motion Sickness, Postop Opioids                         Chano Banks MD  "

## 2019-01-17 NOTE — RESULT ENCOUNTER NOTE
Tacrolimus level 9.5 at 12 hours, on 1/16/19  Goal 9-11.   Current dose 4 mg in AM, 4.5 mg in PM    No dose change. At goal.     Pwinty message sent

## 2019-01-18 ENCOUNTER — TELEPHONE (OUTPATIENT)
Dept: TRANSPLANT | Facility: CLINIC | Age: 59
End: 2019-01-18

## 2019-01-18 DIAGNOSIS — J98.09 BRONCHIAL STENOSIS: Primary | ICD-10-CM

## 2019-01-18 DIAGNOSIS — B33.8 RSV (RESPIRATORY SYNCYTIAL VIRUS INFECTION): ICD-10-CM

## 2019-01-18 DIAGNOSIS — Z94.2 LUNG REPLACED BY TRANSPLANT (H): Primary | ICD-10-CM

## 2019-01-18 LAB
CMV DNA SPEC NAA+PROBE-ACNC: NORMAL [IU]/ML
CMV DNA SPEC NAA+PROBE-LOG#: NORMAL {LOG_IU}/ML
FLUAV H1 2009 PAND RNA SPEC QL NAA+PROBE: NEGATIVE
FLUAV H1 RNA SPEC QL NAA+PROBE: NEGATIVE
FLUAV H3 RNA SPEC QL NAA+PROBE: NEGATIVE
FLUAV RNA SPEC QL NAA+PROBE: NEGATIVE
FLUBV RNA SPEC QL NAA+PROBE: NEGATIVE
HADV DNA SPEC QL NAA+PROBE: NEGATIVE
HADV DNA SPEC QL NAA+PROBE: NEGATIVE
HMPV RNA SPEC QL NAA+PROBE: NEGATIVE
HPIV1 RNA SPEC QL NAA+PROBE: NEGATIVE
HPIV2 RNA SPEC QL NAA+PROBE: NEGATIVE
HPIV3 RNA SPEC QL NAA+PROBE: NEGATIVE
MICROBIOLOGIST REVIEW: ABNORMAL
RHINOVIRUS RNA SPEC QL NAA+PROBE: POSITIVE
RSV RNA SPEC QL NAA+PROBE: NEGATIVE
RSV RNA SPEC QL NAA+PROBE: POSITIVE
SPECIMEN SOURCE: ABNORMAL
SPECIMEN SOURCE: NORMAL

## 2019-01-18 RX ORDER — RIBAVIRIN 200 MG/1
200 TABLET, FILM COATED ORAL
Qty: 30 TABLET | Refills: 0 | Status: SHIPPED | OUTPATIENT
Start: 2019-01-18 | End: 2019-03-05

## 2019-01-19 LAB
ACID FAST STN SPEC QL: NORMAL
BACTERIA SPEC CULT: ABNORMAL
BACTERIA SPEC CULT: ABNORMAL
BACTERIA SPEC CULT: NORMAL
Lab: ABNORMAL
SPECIMEN SOURCE: ABNORMAL
SPECIMEN SOURCE: NORMAL
SPECIMEN SOURCE: NORMAL

## 2019-01-21 DIAGNOSIS — Z94.2 LUNG TRANSPLANT RECIPIENT (H): Primary | ICD-10-CM

## 2019-01-22 ENCOUNTER — TELEPHONE (OUTPATIENT)
Dept: TRANSPLANT | Facility: CLINIC | Age: 59
End: 2019-01-22

## 2019-01-22 ENCOUNTER — TELEPHONE (OUTPATIENT)
Dept: PULMONOLOGY | Facility: CLINIC | Age: 59
End: 2019-01-22

## 2019-01-22 DIAGNOSIS — Z94.2 LUNG TRANSPLANT RECIPIENT (H): Primary | ICD-10-CM

## 2019-01-22 NOTE — TELEPHONE ENCOUNTER
Spoke with patient to schedule procedure with Dr. Alana Lin  Procedure was scheduled on 03/07 at [facility] OR  Patient will have H&P with Pulmonary, [facility]  Patient is aware a / is needed day of surgery.   Surgery letter was sent via [email or web address], patient has my direct contact information for any further questions.

## 2019-01-23 DIAGNOSIS — Z94.2 LUNG REPLACED BY TRANSPLANT (H): ICD-10-CM

## 2019-01-23 PROCEDURE — 80197 ASSAY OF TACROLIMUS: CPT | Performed by: INTERNAL MEDICINE

## 2019-01-24 LAB
TACROLIMUS BLD-MCNC: 8.5 UG/L (ref 5–15)
TME LAST DOSE: NORMAL H

## 2019-01-25 NOTE — RESULT ENCOUNTER NOTE
Kecia, your tacrolimus dose is 8.5 and near goal range.  No dose changes for now.  Call me with questions. Magaly

## 2019-01-31 DIAGNOSIS — Z94.2 LUNG REPLACED BY TRANSPLANT (H): ICD-10-CM

## 2019-01-31 PROCEDURE — 80197 ASSAY OF TACROLIMUS: CPT | Performed by: INTERNAL MEDICINE

## 2019-02-01 DIAGNOSIS — Z94.2 LUNG REPLACED BY TRANSPLANT (H): Primary | ICD-10-CM

## 2019-02-01 LAB
TACROLIMUS BLD-MCNC: 7.5 UG/L (ref 5–15)
TME LAST DOSE: NORMAL H

## 2019-02-01 RX ORDER — PREDNISONE 2.5 MG/1
TABLET ORAL
Qty: 90 TABLET | Refills: 11 | Status: SHIPPED | OUTPATIENT
Start: 2019-02-01 | End: 2019-07-08

## 2019-02-05 DIAGNOSIS — Z29.89 NEED FOR PNEUMOCYSTIS PROPHYLAXIS: Primary | ICD-10-CM

## 2019-02-05 DIAGNOSIS — A42.9 ACTINOMYCES INFECTION: ICD-10-CM

## 2019-02-05 DIAGNOSIS — Z94.2 LUNG REPLACED BY TRANSPLANT (H): ICD-10-CM

## 2019-02-05 RX ORDER — SULFAMETHOXAZOLE AND TRIMETHOPRIM 400; 80 MG/1; MG/1
1 TABLET ORAL DAILY
Qty: 90 TABLET | Refills: 3 | Status: ON HOLD | OUTPATIENT
Start: 2019-02-05 | End: 2019-10-29

## 2019-02-07 DIAGNOSIS — Z94.2 LUNG REPLACED BY TRANSPLANT (H): ICD-10-CM

## 2019-02-07 PROCEDURE — 80197 ASSAY OF TACROLIMUS: CPT | Performed by: INTERNAL MEDICINE

## 2019-02-08 LAB
TACROLIMUS BLD-MCNC: 11.7 UG/L (ref 5–15)
TME LAST DOSE: NORMAL H

## 2019-02-09 LAB
MYCOBACTERIUM SPEC CULT: NORMAL
MYCOBACTERIUM SPEC CULT: NORMAL
SPECIMEN SOURCE: NORMAL

## 2019-02-11 NOTE — RESULT ENCOUNTER NOTE
Kecia, this is a better level for you.  Continue present dose and we can talk about maybe adjusting range a bit at next clinic visit.  Magaly

## 2019-02-12 ENCOUNTER — TELEPHONE (OUTPATIENT)
Dept: TRANSPLANT | Facility: CLINIC | Age: 59
End: 2019-02-12

## 2019-02-12 DIAGNOSIS — R11.2 INTRACTABLE VOMITING WITH NAUSEA, UNSPECIFIED VOMITING TYPE: ICD-10-CM

## 2019-02-12 RX ORDER — ONDANSETRON 4 MG/1
4 TABLET, FILM COATED ORAL EVERY 12 HOURS PRN
Qty: 60 TABLET | Refills: 0 | Status: SHIPPED | OUTPATIENT
Start: 2019-02-12 | End: 2019-06-05

## 2019-02-12 NOTE — TELEPHONE ENCOUNTER
[place] back from Dr Hale:    She will get CMV level tomorrow.  Decline tacrolimus level (was stable last draw).  We can react to level prior to weekend.

## 2019-02-12 NOTE — TELEPHONE ENCOUNTER
Reviewed labs from 2/7/19. Patient will be getting labs either 2/13/19 or 2/14/19.   Wonders if there is a plan for Valcyte dose? Currently taking 450 mg three times per week.

## 2019-02-12 NOTE — TELEPHONE ENCOUNTER
Patient Call: General  Route to LPN    Reason for call: pt wants to review labs and see if any med changes     Call back needed? Yes    Return Call Needed  Same as documented in contacts section  When to return call?: Greater than one day: Route standard priority

## 2019-02-12 NOTE — TELEPHONE ENCOUNTER
Estimated Creatinine Clearance: 37.4 mL/min (A) (based on SCr of 1.25 mg/dL (H)).    Valcyte dose of 3 times weekly is appropriate for CrCL.

## 2019-02-14 NOTE — MR AVS SNAPSHOT
Kecia Blue   3/30/2018   Anticoagulation Therapy Visit    Description:  57 year old female   Provider:  Ramin Hines [facility]   Department:  [facility]           INR as of 3/30/2018     Today's INR 1.85!      Anticoagulation Summary as of 3/30/2018     INR goal 2.0-3.0   Today's INR 1.85!   Full instructions 3/30: 6 mg; 3/31: 5 mg; 4/1: 5 mg; 4/2: 5 mg; Otherwise No maintenance plan   Next INR check 4/3/2018    Indications   Lung transplant recipient (H) [Z94.2]  Deep vein thrombosis (DVT) (H) [I82.409] [I82.409]         March 2018 Details    Sun Mon Tue Wed Thu Fri Sat         1               2               3                 4               5               6               7               8               9               10                 11               12               13               14               15               16               17                 18               19               20               21               22               23               24                 25               26               27               28               29               30      6 mg   See details      31      5 mg          Date Details   03/30 This INR check               How to take your warfarin dose     To take:  5 mg Take 5 of the 1 mg tablets.    To take:  6 mg Take 6 of the 1 mg tablets.           April 2018 Details    Sun Mon Tue Wed Thu Fri Sat     1      5 mg         2      5 mg         3            4               5               6               7                 8               9               10               11               12               13               14                 15               16               17               18               19               20               21                 22               23               24               25               26               27               28                 29               30                     Date Details   No additional details  February 14, 2019    Follow up HFpEF    HPI:   79 yo F with interstitial lung disease (moderate restriction), Sjogren's syndrome, HTN, permanent atrial fibrillation, diverticulitis s/p colectomy 2011 who I am following for HFpEF. She is here for routine HFpEF follow up.     Since I last saw her she feels her breathing is stable. She can walk a block or two before stopping from SOB.  Her weight has been stable. She denies orthopnea/ PND. Her leg swelling is improved from the last we saw her and is now minimal.  She denies chest pain. She has not been in the hospital.     She does use oxygen when doing more strenuous physical activity.     Her blood pressures have been running on the low side and she does report some dizziness.     Of note, she developed an eye hemorrhage a few days ago without trauma and has seen optho for this. INR was within range.     PAST MEDICAL HISTORY:  Past Medical History:   Diagnosis Date     Alcohol abuse, in remission      Allergic rhinitis, cause unspecified     allegra helps when she takes it     Antiplatelet or antithrombotic long-term use      Atrial fibrillation (H)     in hosp in 11/11 after surgery w/ fluid overload     Cardiomegaly     LVH on stress echo- cardiac w/u at [facility] ER- neg CT scan for PE, neg stress echo in 8/06     Chest pain, unspecified      Disorder of bone and cartilage, unspecified     osteopenia (had been on prempro), improved on 6/06 dexa, stable dexa 11/10     Diverticulosis of colon (without mention of hemorrhage)     last episode yrs ago     Essential hypertension, benign      Follicular bronchiolitis (H)     associated with Sjogrens, dx by chest CT showing mosaic attenuation and air trapping     Gastro-oesophageal reflux disease      ILD (interstitial lung disease) (H)     associated with Sjogrens, also has mildly elevated IgG4, first noted on chest CT 2015 (mild changes) and also has small airways disease     Insomnia, unspecified     weaned off clonazepam      Irregular heart beat      Lumbago 7/09    MRI with NEAL, now seeing Dr. Cain for sciatic sx's     Major depressive disorder, recurrent episode, moderate (H)      Obstructive sleep apnea      Osteoarthrosis, unspecified whether generalized or localized, unspecified site      Sjogren's syndrome (H)     + RG and SSA and lip bx     Sleep apnea      Tobacco use disorder     chantix in 9/07, started again in 6/08, working       FAMILY HISTORY:  Mother had MI and CHF in her 60's. Sister had MI and CHF in her 60's      SOCIAL HISTORY:  1/2 pack/yr for 25 yrs, quit 20 yrs ago, no etoh/drug use. Retired teacher      CURRENT MEDICATIONS:  Current Outpatient Medications   Medication Sig Dispense Refill     ACCU-CHEK SHANNON PLUS test strip USE TO TEST BLOOD SUGARS 4 TIMES DAILY OR AS DIRECTED 400 strip 0     acetaminophen (TYLENOL) 500 MG tablet Take 500 mg by mouth nightly as needed        acyclovir (ZOVIRAX) 400 MG tablet Take 1 tablet (400 mg) by mouth 3 times daily For a couple days 15 tablet 2     acyclovir (ZOVIRAX) 5 % external ointment Apply topically 6 times daily As needed for outbreaks 15 g 3     albuterol (PROAIR HFA, PROVENTIL HFA, VENTOLIN HFA) 108 (90 BASE) MCG/ACT inhaler Inhale 2 puffs into the lungs every 6 hours 1 Inhaler 3     alendronate (FOSAMAX) 70 MG tablet Take 1 tablet (70 mg) by mouth every 7 days 4 tablet 11     atorvastatin (LIPITOR) 10 MG tablet TAKE 1 TABLET BY MOUTH EVERY DAY 90 tablet 0     BASAGLAR 100 UNIT/ML injection Inject 25 Units Subcutaneous daily 7.5 mL 2     BD JANE U/F 32G X 4 MM insulin pen needle USE 4 DAILY AS DIRECTED. 400 each 1     blood glucose monitoring (ACCU-CHEK SHANNON PLUS) test strip Use to test blood sugar 4 times daily or as directed.  Ok to substitute alternative if insurance prefers. 120 strip 11     blood glucose monitoring (ACCU-CHEK FASTCLIX) lancets Use to test blood sugar 4 times daily or as directed.  Ok to substitute alternative if insurance prefers. 1 Box 11     Date of next INR:  4/3/2018         How to take your warfarin dose     To take:  5 mg Take 5 of the 1 mg tablets.                blood glucose monitoring (ACCU-CHEK MULTICLIX) lancets Use to test blood sugar 4 times daily or as directed. 4 Box 3     COMPRESSION STOCKINGS Wear compression stockings in affected leg (right leg) or both legs most of the time during the day and take them off at night. 2 each 2     escitalopram (LEXAPRO) 20 MG tablet TAKE 1 TABLET (20 MG) BY MOUTH DAILY 90 tablet 1     fluticasone (FLONASE) 50 MCG/ACT nasal spray Spray 1-2 sprays into both nostrils daily as needed for allergies 1 Bottle 3     fluticasone (FLOVENT HFA) 220 MCG/ACT inhaler Inhale 2 puffs into the lungs 2 times daily 3 Inhaler 3     gabapentin (NEURONTIN) 300 MG capsule Take 1 capsule (300 mg) by mouth At Bedtime 30 capsule 0     Humidifier MISC Continuous humidified air via concentrator.   Diagnosis: ILD  Duration: Lifetime 99. 1 each 1     insulin glargine (BASAGLAR KWIKPEN) 100 UNIT/ML pen INJECT 25 UNITS SUBCUTANEOUS DAILY (TO REPLACE LANTUS) 15 mL 1     insulin pen needle (B-D U/F) 31G X 8 MM Use 4 times daily (with Lantus and Novolog) or as directed. 400 each 3     ketoconazole (NIZORAL) 2 % external cream Apply topically 2 times daily 60 g 1     lidocaine (LIDODERM) 5 % Patch Apply patch to painful area for up to 12 h within a 24 h period.  Remove after 12 hours. 30 patch 0     lisinopril (PRINIVIL/ZESTRIL) 2.5 MG tablet Take 1 tablet (2.5 mg) by mouth daily 90 tablet 2     metFORMIN (GLUCOPHAGE-XR) 500 MG 24 hr tablet TAKE 2 TABLETS BY MOUTH DAILY WITH DINNER 180 tablet 0     metoprolol succinate (TOPROL XL) 25 MG 24 hr tablet Take 0.5 tablets (12.5 mg) by mouth 2 times daily Take 50 mg by mouth in combination with 12.5 for a total of 62.5 twice a day 90 tablet 3     metoprolol succinate (TOPROL-XL) 100 MG 24 hr tablet Take 50 mg by mouth in combination with 12.5 for a total of 62.5 twice a day 90 tablet 3     montelukast (SINGULAIR) 10 MG tablet TAKE 1 TABLET BY MOUTH EVERYDAY AT BEDTIME 90 tablet 0     NOVOLOG FLEXPEN 100 UNIT/ML soln  INJECT 12 UNITS SUBCUTANEOUS 3 TIMES DAILY (WITH MEALS) 15 mL 1     NOVOLOG FLEXPEN 100 UNIT/ML soln INJECT 12 UNITS SUBCUTANEOUS 3 TIMES DAILY (WITH MEALS) 15 mL 1     order for DME Equipment being ordered: Oxygen  Pulsed oxygen portable tank  Rate: 4LNC  Diagnosis: ILD  Duration: Lifetime -99 1 Device 1     order for DME Equipment being ordered: Full face mask for CPAP - size: F10 large 1 each 0     order for DME Oxygen: Patient requires supplemental Oxygen 4 LPM via nasal canula with activity. Please provide patient with a home unit and with portability capability. Oxygen will be for a lifetime. 1 Device 0     potassium chloride SA (K-DUR/KLOR-CON M) 20 MEQ CR tablet Take 2 tabs (40 meq) in am and 1 tab (20 meq) in pm daily 270 tablet 3     predniSONE (DELTASONE) 1 MG tablet Use with 5 mg tabs to taper: 10 mg and 9 mg every other day for 1m, 9 mg/day for 1m, 9 mg and 8 mg every other day for 1m, etc. until at 7mg 120 tablet 6     predniSONE (DELTASONE) 5 MG tablet Take 5 mg by mouth daily. 60 tablet 0     spironolactone (ALDACTONE) 25 MG tablet Take 2 tablets (50 mg) by mouth daily 180 tablet 3     tiZANidine (ZANAFLEX) 2 MG tablet Take 1-2 tablets (2-4 mg) by mouth 3 times daily 90 tablet 1     torsemide (DEMADEX) 20 MG tablet Take 40 mg in the morning, 30 mg in the afternoon. 105 tablet 3     traZODone (DESYREL) 50 MG tablet Take 0.5-1 tablets (25-50 mg) by mouth nightly as needed for sleep 30 tablet 5     warfarin (COUMADIN) 7.5 MG tablet TAKE 1 TABLET BY MOUTH DAILY. CURRENT DOSE IS 7.5 MG DAILY. DOSE ADJUSTED PER INR RESULT. 90 tablet 3       ROS:   Constitutional: No fever, chills. No weight gain/loss.   ENT: eye hematoma ear ache, epistaxis, sore throat.   Allergies/Immunologic: Negative.   Respiratory: + intermittent cough, no hemoptysis.   Cardiovascular: As per HPI.   GI: No nausea, vomiting, hematemesis, melena, or hematochezia.   : No urinary frequency, dysuria, or hematuria.   Integument: Negative.  "  Psychiatric: Negative.   Neuro: Negative.   Endocrinology: Negative.   Musculoskeletal: Negative.    EXAM:  BP 91/62 (BP Location: Right arm, Patient Position: Chair, Cuff Size: Adult Large)   Pulse 67   Ht 1.702 m (5' 7\")   Wt 95.7 kg (211 lb)   SpO2 99%   BMI 33.05 kg/m    General: appears comfortable, alert and articulate  Head: normocephalic, atraumatic  Eyes: R eye with conjunctival hemorrhage, vision intact    Neck: no adenopathy  Orophyarynx: moist mucosa, no lesions, dentition intact  Heart: Irregular, S1/S2, no murmur, gallop, rub, estimated JVP <10   Lungs: diffuse expiratory wheezing, no crackles,no dullness   Abdomen: soft, non-tender, bowel sounds present, no hepatosplenomegaly  Extremities: trace LE edema, no clubbing, cyanosis.  Neurological: normal speech and affect, no gross motor deficits    Labs:  CBC RESULTS:  Lab Results   Component Value Date    WBC 15.6 (H) 11/08/2017    RBC 4.59 11/08/2017    HGB 13.4 11/08/2017    HCT 42.1 11/08/2017    MCV 92 11/08/2017    MCH 29.2 11/08/2017    MCHC 31.8 11/08/2017    RDW 16.7 (H) 11/08/2017     11/08/2017       CMP RESULTS:  Lab Results   Component Value Date     02/13/2019    POTASSIUM 4.3 02/13/2019    CHLORIDE 104 02/13/2019    CO2 25 02/13/2019    ANIONGAP 8 02/13/2019     (H) 02/13/2019    BUN 24 02/13/2019    CR 0.87 02/13/2019    GFRESTIMATED 64 02/13/2019    GFRESTBLACK 74 02/13/2019    CLAUDY 9.3 02/13/2019    BILITOTAL 0.7 05/23/2018    ALBUMIN 3.3 (L) 05/23/2018    ALKPHOS 71 05/23/2018    ALT 21 05/23/2018    AST 15 05/23/2018        INR RESULTS:  Lab Results   Component Value Date    INR 2.5 (A) 11/28/2018    INR 2.08 (H) 10/22/2018       Lab Results   Component Value Date    MAG 2.0 04/11/2017     Lab Results   Component Value Date    NTBNPI 3,531 (H) 04/06/2017     Lab Results   Component Value Date    NTBNP 804 (H) 04/11/2018     HgA1c 7% Sept 2016    ECG :10/26/16 atrial fibrillation HR 72    48hr holter July 2016- " generally controlled atrial fibrillation    July 2016 Complete Portable Echo Adult. Contrast Optison. Optison (NDC #4957-5274-55)  given intravenously. Patient was given 4 ml mixture of 3 ml Optison and 6 ml  saline. 5 ml wasted. Interpretation Summary  Atrial fibrillation  Left ventricular function, chamber size, wall motion, and wall thickness are  normal.The EF is 60-65%. Normal LV size and wall thickness, mild bilateral atrial enlargement  PA pressure cannot be determined  Estimated RA pressure 8 mmHg    Regadenosen MPI 2014: no fixed or inducible defects    Assessment and Plan:  In summary this is a very pleasant 78 year old female with suspected HFpEF who is referred today for further evaluation and treatment.     In support of the diagnosis of HFpEF includes mild LA enlargement, echocardiographic signs of increase LV filling pressures, increased nt-bnp, as well as a diuretic requirement and symptomatic improvement on diuretics.    The risk factors for HFpEF in her include age, gender, HTN, pre-diabetes, sleep apnea and obesity.  A prior stress test was negative for CAD. She is presently euvolemic on exam and is NYHA class III (mutifactorial) I am increasing her diuretics as listed below.     The mainstays of therapy for HFpEF include volume management, blood pressure control, treating underlying sleep apnea if present, weight management, exercise training, rate control for atrial fibrillation as well as consideration for a rhythm control strategy, as well as consideration for clinical trials.  I do have her on spironolactone given the results of the TOPCAT trial. Patients have symptomatically felt very improved on this medication. Her a fib rates have been under better control recently and her ischemia evaluation is negative.     Plan for HFpEF: continue current dose of diuretics, BP is controlled, volume status is euvolemic     Other:   Hypertension: if anything her blood pressure is too well controlled,  stopping lisinopril today        Af fib:- atrial fibrillation [id number] (age >75, HTN, CHF, gender) - rates controlled on metoprolol , I am changing her to a NOAC today given patient preference.     Primary prevention: on statin, no ASA given she is on anti-coagulation     Eye hemorrhage: per optho- INR was within range.     We look forward to seeing Sister Sita in 6 months.     Radha Rosa  [facility]      Director,  [facility] HFpEF Program       CC  Patient Care Team:  Ilene Tristan MD as PCP - General  Ilene Tristan MD as PCP - Assigned PCP  Kirill Polk MD as MD (Otolaryngology)  Aubrey Hurtado MD as MD (Cardiology)  Jasvir Franco MD as Resident (Internal Medicine)  Danay Payne, RN as Nurse Coordinator (Clinical Cardiac Electrophysiology)  Anderson Perez MD as MD (Clinical Cardiac Electrophysiology)  Radha Rosa MD as MD (Cardiology)  Kiesha Elias, LIZY CNP as Nurse Practitioner (Cardiology)  Beatriz Blanco, RN as Nurse Coordinator (Cardiology)  Shante Nash MD as Resident (Student in organized health care education/training program)  Jen Clark MD as Fellow (Student in organized health care education/training program)  Carmenza Stringer MD as MD (Rheumatology)  KIESHA ELIAS

## 2019-02-15 ENCOUNTER — TELEPHONE (OUTPATIENT)
Dept: TRANSPLANT | Facility: CLINIC | Age: 59
End: 2019-02-15

## 2019-02-15 DIAGNOSIS — Z94.2 LUNG REPLACED BY TRANSPLANT (H): ICD-10-CM

## 2019-02-15 DIAGNOSIS — D64.9 ANEMIA, UNSPECIFIED TYPE: ICD-10-CM

## 2019-02-15 DIAGNOSIS — Z94.2 LUNG TRANSPLANT RECIPIENT (H): ICD-10-CM

## 2019-02-15 DIAGNOSIS — J84.9 ILD (INTERSTITIAL LUNG DISEASE) (H): ICD-10-CM

## 2019-02-15 DIAGNOSIS — Z94.2 S/P LUNG TRANSPLANT (H): ICD-10-CM

## 2019-02-15 DIAGNOSIS — B25.9 CYTOMEGALOVIRUS (CMV) VIREMIA (H): Primary | ICD-10-CM

## 2019-02-15 DIAGNOSIS — Z79.899 ENCOUNTER FOR LONG-TERM (CURRENT) USE OF HIGH-RISK MEDICATION: ICD-10-CM

## 2019-02-15 RX ORDER — VALGANCICLOVIR 450 MG/1
450 TABLET, FILM COATED ORAL DAILY
Qty: 30 TABLET | Refills: 11 | Status: SHIPPED | OUTPATIENT
Start: 2019-02-15 | End: 2019-03-05

## 2019-02-15 NOTE — Clinical Note
See referral to IDPatient here with other appts 3/5-3/7Call patient on mobile phone for confirmation.

## 2019-02-15 NOTE — TELEPHONE ENCOUNTER
Contacted Kecia to notify her of dose change to Valcyte 450mg daily from 3x/week for increasing viral load.  She understands and will implement.    Referral sent to ID.    Continue monitoring viral load.

## 2019-02-15 NOTE — TELEPHONE ENCOUNTER
----- Message from Alex Hale MD sent at 2/15/2019 12:11 PM CST -----  Regarding: RE: should we incrase dose?  Yes, I would increase the dose for her creatinine clearance and refer her to ID.  Thank you,   Alxe     ----- Message -----  From: Zeny Tony RN  Sent: 2/12/2019   1:47 PM  To: Alex Hale MD, Mikayla Latham RN, #  Subject: should we incrase dose?                          Hi,  After multiple checks Kecia's CMV level is rising a bit every week.  Dr Christensen decreased in December it from daily to 3x/week as levels were negative.     She has been on 3x/week since then.  We are having her recheck another level tomorrow, but if positive again and rising should we change dose to daily?  Right?    Thank you   Magaly and Mikayla  Please reply all

## 2019-02-18 ENCOUNTER — TELEPHONE (OUTPATIENT)
Dept: INFECTIOUS DISEASES | Facility: CLINIC | Age: 59
End: 2019-02-18

## 2019-02-18 NOTE — TELEPHONE ENCOUNTER
[facility] Call Center    Phone Message    May a detailed message be left on voicemail: yes    Reason for Call: Other: Elsy from Transplant scheduling called to schedule a f/u appt for pt with Abassi. There is no template available for Abassi. Please call pt back to assist with scheduling.     Action Taken: Message routed to:  [facility] ([facility]): clinic coord ID

## 2019-02-27 DIAGNOSIS — Z94.2 LUNG REPLACED BY TRANSPLANT (H): ICD-10-CM

## 2019-02-27 PROCEDURE — 80197 ASSAY OF TACROLIMUS: CPT | Performed by: INTERNAL MEDICINE

## 2019-02-28 LAB
TACROLIMUS BLD-MCNC: 9.9 UG/L (ref 5–15)
TME LAST DOSE: NORMAL H

## 2019-03-05 ENCOUNTER — OFFICE VISIT (OUTPATIENT)
Dept: TRANSPLANT | Facility: CLINIC | Age: 59
End: 2019-03-05
Attending: INTERNAL MEDICINE
Payer: COMMERCIAL

## 2019-03-05 ENCOUNTER — RESULTS ONLY (OUTPATIENT)
Dept: OTHER | Facility: CLINIC | Age: 59
End: 2019-03-05

## 2019-03-05 ENCOUNTER — ANCILLARY PROCEDURE (OUTPATIENT)
Dept: GENERAL RADIOLOGY | Facility: CLINIC | Age: 59
End: 2019-03-05
Payer: COMMERCIAL

## 2019-03-05 VITALS
BODY MASS INDEX: 19.91 KG/M2 | TEMPERATURE: 98.3 F | SYSTOLIC BLOOD PRESSURE: 169 MMHG | HEART RATE: 89 BPM | DIASTOLIC BLOOD PRESSURE: 97 MMHG | OXYGEN SATURATION: 99 % | WEIGHT: 116 LBS

## 2019-03-05 DIAGNOSIS — Z79.899 ENCOUNTER FOR LONG-TERM (CURRENT) USE OF HIGH-RISK MEDICATION: ICD-10-CM

## 2019-03-05 DIAGNOSIS — Z94.2 S/P LUNG TRANSPLANT (H): ICD-10-CM

## 2019-03-05 DIAGNOSIS — Z94.2 LUNG TRANSPLANT RECIPIENT (H): ICD-10-CM

## 2019-03-05 DIAGNOSIS — B25.9 CYTOMEGALOVIRUS (CMV) VIREMIA (H): ICD-10-CM

## 2019-03-05 DIAGNOSIS — J84.9 ILD (INTERSTITIAL LUNG DISEASE) (H): ICD-10-CM

## 2019-03-05 DIAGNOSIS — D64.9 ANEMIA, UNSPECIFIED TYPE: ICD-10-CM

## 2019-03-05 LAB
ANION GAP SERPL CALCULATED.3IONS-SCNC: 7 MMOL/L (ref 3–14)
BUN SERPL-MCNC: 21 MG/DL (ref 7–30)
CALCIUM SERPL-MCNC: 8.6 MG/DL (ref 8.5–10.1)
CHLORIDE SERPL-SCNC: 102 MMOL/L (ref 94–109)
CO2 SERPL-SCNC: 26 MMOL/L (ref 20–32)
CREAT SERPL-MCNC: 0.89 MG/DL (ref 0.52–1.04)
ERYTHROCYTE [DISTWIDTH] IN BLOOD BY AUTOMATED COUNT: 15.8 % (ref 10–15)
EXPTIME-PRE: 7.25 SEC
FEF2575-%PRED-PRE: 28 %
FEF2575-PRE: 0.69 L/SEC
FEF2575-PRED: 2.43 L/SEC
FEFMAX-%PRED-PRE: 55 %
FEFMAX-PRE: 3.59 L/SEC
FEFMAX-PRED: 6.42 L/SEC
FEV1-%PRED-PRE: 51 %
FEV1-PRE: 1.31 L
FEV1FEV6-PRE: 67 %
FEV1FEV6-PRED: 81 %
FEV1FVC-PRE: 67 %
FEV1FVC-PRED: 80 %
FIFMAX-PRE: 2.57 L/SEC
FVC-%PRED-PRE: 60 %
FVC-PRE: 1.97 L
FVC-PRED: 3.23 L
GFR SERPL CREATININE-BSD FRML MDRD: 72 ML/MIN/{1.73_M2}
GLUCOSE SERPL-MCNC: 111 MG/DL (ref 70–99)
HCT VFR BLD AUTO: 27.3 % (ref 35–47)
HGB BLD-MCNC: 8.1 G/DL (ref 11.7–15.7)
INR PPP: 1.04 (ref 0.86–1.14)
MAGNESIUM SERPL-MCNC: 1.6 MG/DL (ref 1.6–2.3)
MCH RBC QN AUTO: 30.3 PG (ref 26.5–33)
MCHC RBC AUTO-ENTMCNC: 29.7 G/DL (ref 31.5–36.5)
MCV RBC AUTO: 102 FL (ref 78–100)
PLATELET # BLD AUTO: 332 10E9/L (ref 150–450)
POTASSIUM SERPL-SCNC: 3.7 MMOL/L (ref 3.4–5.3)
RBC # BLD AUTO: 2.67 10E12/L (ref 3.8–5.2)
SODIUM SERPL-SCNC: 136 MMOL/L (ref 133–144)
TACROLIMUS BLD-MCNC: 7.9 UG/L (ref 5–15)
TME LAST DOSE: NORMAL H
WBC # BLD AUTO: 3.2 10E9/L (ref 4–11)

## 2019-03-05 PROCEDURE — 80048 BASIC METABOLIC PNL TOTAL CA: CPT | Performed by: INTERNAL MEDICINE

## 2019-03-05 PROCEDURE — 86832 HLA CLASS I HIGH DEFIN QUAL: CPT | Performed by: INTERNAL MEDICINE

## 2019-03-05 PROCEDURE — G0463 HOSPITAL OUTPT CLINIC VISIT: HCPCS | Mod: ZF

## 2019-03-05 PROCEDURE — 86833 HLA CLASS II HIGH DEFIN QUAL: CPT | Performed by: INTERNAL MEDICINE

## 2019-03-05 PROCEDURE — 85027 COMPLETE CBC AUTOMATED: CPT | Performed by: INTERNAL MEDICINE

## 2019-03-05 PROCEDURE — 85610 PROTHROMBIN TIME: CPT | Performed by: INTERNAL MEDICINE

## 2019-03-05 PROCEDURE — 36415 COLL VENOUS BLD VENIPUNCTURE: CPT | Performed by: INTERNAL MEDICINE

## 2019-03-05 PROCEDURE — 80197 ASSAY OF TACROLIMUS: CPT | Performed by: INTERNAL MEDICINE

## 2019-03-05 PROCEDURE — 83735 ASSAY OF MAGNESIUM: CPT | Performed by: INTERNAL MEDICINE

## 2019-03-05 RX ORDER — FERROUS SULFATE 325(65) MG
325 TABLET ORAL
Qty: 60 TABLET | Refills: 2 | Status: ON HOLD | OUTPATIENT
Start: 2019-03-05 | End: 2019-09-15

## 2019-03-05 RX ORDER — VALGANCICLOVIR 450 MG/1
900 TABLET, FILM COATED ORAL DAILY
Qty: 30 TABLET | Refills: 11 | Status: SHIPPED | OUTPATIENT
Start: 2019-03-05 | End: 2019-03-05

## 2019-03-05 RX ORDER — VALGANCICLOVIR 450 MG/1
900 TABLET, FILM COATED ORAL 2 TIMES DAILY
Qty: 120 TABLET | Refills: 4 | Status: SHIPPED | OUTPATIENT
Start: 2019-03-05 | End: 2019-04-29

## 2019-03-05 ASSESSMENT — PAIN SCALES - GENERAL: PAINLEVEL: NO PAIN (0)

## 2019-03-05 NOTE — NURSING NOTE
"Transplant Coordinator Note    Reason for visit: Post lung transplant follow up visit   Coordinator: Present   Caregiver:  Ranjan (spouse)    Health concerns addressed today:  1.  and decreased from previous level , pt to see ID this week  2. WBC 3.2, will stop Imuran  3. Creatinine normal and will increase Valcyte dosign to treatment dose of 900mg BID  4. \"Overall feeling well\"  5. INR normal and denies using aspirin, bronch on 3-7-19  6. Seeing ID this week  7. BP normal 130s/80s, monitor twice daily for next week and call coordinator with update    Activity: \"doing lots of things\",  not in pulmonary rehab, will check with water therapy team to pursue    Oxygen needs: room air, PFTs improved    Pt Education: medications (use/dose/side effects), how/when to call coordinator, frequency of labs, s/s of infection/rejection, call prior to starting any new medications, lab/vital sign book     Labs, CXR, PFTs reviewed with patient  Medication record reviewed and reconciled  Questions and concerns addressed    Patient Instructions  1. Stop Imuran  2. Increase Valcyte to 900mg twice daily  3. Bronch on 3-7-19 at 0900, follow preop instructions per thoracic interventional team  4. Hydrate with 50 oz of fluids daily  5. Call if you need a script to start water therapy for pulmonary rehab  6. Call with any changes or concerns    Set up 6 minute walk with your next clinic visit    Next transplant clinic appointment:  To be determine following bronch  Next lab draw: will need weekly labs to monitor CMV and CBC counts, electrolytes    AVS printed at time of check out          "

## 2019-03-05 NOTE — PROGRESS NOTES
"[facility]  Post - Lung Transplant Clinic Note  3/5/19           Patient: Kecia Blue  MRN: [id number]  : 1960  Transplant Date:3/1/18  (POD# 12)           Assessment and Plan:       1.  Status post bilateral lung transplant, performed on 2018 for interstitial lung disease associated with dermatomyositis, rheumatoid arthritis and pulmonary hypertension.  Her current immune suppressive medications are tacrolimus, azathioprine (dose recently decreased due to leukopenia), and prednisone.  These medications will be continued and adjusted according to our protocols.      2.  Right main bronchial stenosis.  Her most recent dilation was on 2018 and bronchoscopy will be repeated tomorrow.      3.  Infectious disease.  She is on Bactrim for PCP prophylaxis.  She is also on Valcyte 450 mg a day, following an episode of CMV reactivation in 2018.  Today's CMV PCR will be checked when available.        4.  History of PHS \"increased risk\" donor.  Followup labs will be checked at her 1 year anniversary.      5.  Chronic kidney disease, with today's creatinine in her baseline range.      6.  Anemia, improved since starting iron in 2018.  Iron will be decreased to once a day and continued at least for a few more months.      7.  Leukopenia, improved after decreasing azathioprine dose.      8.  Early satiety and a low appetite, improved with Marinol and p.r.n. Zofran.      9.  Hypertension, under good control with low-dose metoprolol.      10.  Bilateral knee arthritis.  Today, she has crepitations in the right knee.      11.  Health care maintenance.  She has already had her flu vaccine for this season.  She got a DTaP booster on 2018.  She will check with her insurance company to see if they cover the cost of the ShingRix.      12.  Inactivity.  The patient has a friend who is a physical therapist and who can help her get into an aqua aerobics program " near her home.   13.  History of right retinal vein occlusion, being followed by her personal ophthalmologist.     General Recommendations:  1. Encourage daily physical activity  2. Encourage po adequate po hydration (favor 1 ounce/kg)  2. Strongly encourage participation in Pulmonary Rehabilitation  3. Encourage continued follow-up with your Primary Care Physician for age- and gender-specific health care maintenance including yearly dermatological examination    Please also refer to RN Transplant Coordinator note for additional information related to this visit.    Encourage daily physical activity  RTC in 1 month for repeat physical exam, ROS, labs including tacrolimus level, spirometry, chest xray, cbc, and bmp.      Complexity indicators:     --immune compromised, on high-risk medications x   --organ transplant recipient x   --multiple organ transplant recipient     --active respiratory infection     --within one year of transplant; and/or within one month of hospitalization    --chronic lung allograft dysfunction syndrome (CLAD, chronic rejection, or bronchiolitis obliterans syndrome)     --new medical problem addressed during this visit     --multiple active medical problems x   --admitted directly to hospital from this clinic visit     -->50% of this visit was spent in counseling and care coordination. If yes, total visit time was             Alex Hale MD, [facility]  Transplant Pulmonologist   of Medicine  Division of Pulmonary, Allergy, Critical Care and Sleep Medicine  Department of Medicine  [facility]  Pager: [phone]  3/5/19            Subjective & Interval History:     Presents for routine clinic Patient is without new complaints.She denies palpitations, lightheadedness, fevers, chills, chest pain, N/V, diarrhea and constipation. She states tries to be physically active on a daily basis.. She reports that her appetite is good and feels he is  taking in adequate PO's             Review of Systems:     ROS: 10 point ROS neg other than the symptoms noted above in the HPI.         Allergies and Medications:      No Known Allergies    Current Outpatient Medications   Medication     ACETAMINOPHEN PO     azaTHIOprine (IMURAN) 50 MG tablet     calcium-vitamin D (CALTRATE) 600-400 MG-UNIT per tablet     dronabinol (MARINOL) 5 MG capsule     ferrous sulfate (FEROSUL) 325 (65 Fe) MG tablet     magnesium oxide (MAG-OX) 400 MG tablet     metoprolol tartrate (LOPRESSOR) 25 MG tablet     multivitamin, therapeutic with minerals (THERA-VIT-M) TABS tablet     ondansetron (ZOFRAN) 4 MG tablet     order for DME     order for DME     pantoprazole (PROTONIX) 40 MG EC tablet     pentoxifylline (TRENTAL) 400 MG CR tablet     predniSONE (DELTASONE) 2.5 MG tablet     predniSONE (DELTASONE) 5 MG tablet     sulfamethoxazole-trimethoprim (BACTRIM/SEPTRA) 400-80 MG tablet     tacrolimus (GENERIC EQUIVALENT) 0.5 MG capsule     tacrolimus (GENERIC EQUIVALENT) 1 MG capsule     valGANciclovir (VALCYTE) 450 MG tablet     ribavirin (COPEGUS) 200 MG tablet     No current facility-administered medications for this visit.               Physical Exam:     GEN: Awake and alert, in no acute distress, sitting upright in chair. Pleasant and cooperative  HEENT: Pupils equal and reactive to light, conjunctiva are pink conjunctivae, sclera anicteric,  Oral mucosa moist without lesions.  NECK: supple no JVD/LAD  PULM: Good air flow bilaterally, symmetric in expansion,CTAB now wheezing/rhonchi. Breathing non-labored.   CV: Normal S1 and S2. RRR.  No murmur, gallop, or rub.  No peripheral edema.  Peripheral pulses intact.   ABD: Soft, nontender, nondistended. Bowel sound normoactive, no guarding, no rebound.   MSK: .  No apparent muscle wasting. No clubbing, cyanosis, or edema  NEURO: Alert and oriented to person, place, and time, moving all fours, gait not assessed  SKIN: Warm and dry. No visible  rashes or lesions   PSYCH: Mood stable, judgment and insight appropriate.          Data:     All vital signs, laboratory and imaging data for the past 24 hours reviewed.      Vital signs: BP (!) 169/97 (BP Location: Left arm)   Pulse 89   Temp 98.3  F (36.8  C) (Oral)   Wt 52.6 kg (116 lb)   LMP 06/07/2014 (Exact Date)   SpO2 99%   BMI 19.91 kg/m      Wt Readings from Last 4 Encounters:   03/05/19 52.6 kg (116 lb)   01/17/19 47.1 kg (103 lb 13.4 oz)   01/16/19 48.1 kg (106 lb)   12/14/18 50.6 kg (111 lb 8.8 oz)     Results for SARAI KILLIAN BRODERICK (MRN [id number]) as of 3/5/2019 11:42   1/16/2019 09:00 1/23/2019 11:00 1/31/2019 11:40 2/7/2019 09:30 2/27/2019 10:13   Tacrolimus Level 9.5 8.5 7.5 11.7 9.9                              Weight trend: There were no vitals filed for this visit.     CXR 2V 3/5/2019 I have personally reviewed the examination and initial interpretation  and I agree with the findings.  IMPRESSION: No acute intrathoracic disease. Small pleural effusions.    Pulmonary Function Tests   Most Recent [facility] Pulmonary Function Testing    FVC-Pred   Date Value Ref Range Status   03/05/2019 3.23 L    01/16/2019 3.23 L    12/04/2018 3.23 L    11/19/2018 3.23 L    10/09/2018 3.24 L      FVC-Pre   Date Value Ref Range Status   03/05/2019 1.97 L    01/16/2019 1.92 L    12/04/2018 1.84 L    11/19/2018 1.67 L    10/09/2018 1.86 L      FVC-%Pred-Pre   Date Value Ref Range Status   03/05/2019 60 %    01/16/2019 59 %    12/04/2018 56 %    11/19/2018 51 %    10/09/2018 57 %      FEV1-Pre   Date Value Ref Range Status   03/05/2019 1.31 L    01/16/2019 1.04 L    12/04/2018 0.98 L    11/19/2018 0.54 L    10/09/2018 0.82 L      FEV1-%Pred-Pre   Date Value Ref Range Status   03/05/2019 51 %    01/16/2019 40 %    12/04/2018 37 %    11/19/2018 20 %    10/09/2018 31 %      FEV1FVC-Pred   Date Value Ref Range Status   03/05/2019 80 %    01/16/2019 79 %    12/04/2018 79 %    11/19/2018 79 %    10/09/2018 79 %       FEV1FVC-Pre   Date Value Ref Range Status   03/05/2019 67 %    01/16/2019 54 %    12/04/2018 53 %    11/19/2018 32 %    10/09/2018 44 %      No results found for: 86373  FEFMax-Pred   Date Value Ref Range Status   03/05/2019 6.42 L/sec    01/16/2019 6.41 L/sec    12/04/2018 6.42 L/sec    11/19/2018 6.42 L/sec    10/09/2018 6.43 L/sec      FEFMax-Pre   Date Value Ref Range Status   03/05/2019 3.59 L/sec    01/16/2019 3.36 L/sec    12/04/2018 1.94 L/sec    11/19/2018 1.16 L/sec    10/09/2018 2.33 L/sec      FEFMax-%Pred-Pre   Date Value Ref Range Status   03/05/2019 55 %    01/16/2019 52 %    12/04/2018 30 %    11/19/2018 18 %    10/09/2018 36 %      ExpTime-Pre   Date Value Ref Range Status   03/05/2019 7.25 sec    01/16/2019 6.93 sec    12/04/2018 7.49 sec    11/19/2018 9.71 sec    10/09/2018 6.96 sec      FIFMax-Pre   Date Value Ref Range Status   03/05/2019 2.57 L/sec    01/16/2019 2.05 L/sec    12/04/2018 2.33 L/sec    11/19/2018 1.54 L/sec    10/09/2018 1.74 L/sec      FEV1FEV6-Pred   Date Value Ref Range Status   03/05/2019 81 %    01/16/2019 81 %    12/04/2018 81 %    11/19/2018 81 %    10/09/2018 81 %      FEV1FEV6-Pre   Date Value Ref Range Status   03/05/2019 67 %    01/16/2019 54 %    12/04/2018 53 %    11/19/2018 36 %    10/09/2018 39 %         10/9/2018 13:47 11/19/2018 06:48 12/4/2018 11:21 1/16/2019 09:24 3/5/2019 09:48   FEV1-Pre 0.82 0.54 0.98 1.04 1.31   FEV1-%Pred-Pre 31 20 37 40 51     PFT 3/5/2019 interpretation: maneuver is valid and meets ATS guidelines  1. FVC is reduced The decrease in FVC may represent restrictive physiology.  Lung volumes would be necessary to determine.  2. FEV1 is reduced and consistent with moderate-severe obstruction  3. FEV1/FVC has a normal ratio with decreased FEV1 and FVC  Compared to prior: Her spirometry is significantly improved and represents her post-lTX best.              Historical CMV results (last 3 of prior testing):  Lab Results   Component Value Date     CMVQNT CMV DNA Not Detected 11/21/2018    CMVQNT CMV DNA Not Detected 11/19/2018    CMVQNT CMV DNA Not Detected 11/15/2018     Lab Results   Component Value Date    CMVLOG Not Calculated 11/21/2018    CMVLOG Not Calculated 11/19/2018    CMVLOG Not Calculated 11/15/2018     Patient was seen and examined by me. I personally reviewed the electronic medical record including labs, flowsheets, imaging reports and films, vitals, and medications. I discussed the case in detail with the post-LTX nurse coordinator    Clinical update was provided to the patient and his/her I answered all of their questions and provided them support    Alex Hale MD, [facility]  Transplant Pulmonologist   of Medicine  Division of Pulmonary, Allergy, Critical Care and Sleep Medicine  Department of Medicine  [facility]  Pager: [phone]  3/5/2019

## 2019-03-05 NOTE — PATIENT INSTRUCTIONS
Patient Instructions  1. Stop Imuran  2. Increase Valcyte to 900mg twice daily  3. Bronch on 3-7-19 at 0900, follow preop instructions per thoracic interventional team  4. Hydrate with 50 oz of fluids daily  5. Call if you need a script to start water therapy for pulmonary rehab  6. Call with any changes or concerns  7. BP normal 130s/80s, monitor twice daily for next week and call coordinator with update    Set up 6 minute walk with your next clinic visit    Next transplant clinic appointment:  To be determine following bronch  Next lab draw: will need weekly labs to monitor CMV and CBC counts, electrolytes    AVS printed at time of check out            ~~~~~~~~~~~~~~~~~~~~~~~~~    Thoracic Transplant Office phone [phone], fax [phone]    Office Hours 8:30 - 5:00     For after-hours urgent issues, please dial [phone], and ask to speak with the Thoracic Transplant Coordinator  On-Call, pager [phone].  --------------------  To expedite your medication refill(s), please contact your pharmacy and have them fax a refill request to: [phone]  .   *Please allow 3 business days for routine medication refills.  *Please allow 5 business days for controlled substance medication refills.    **For Diabetic medications and supplies refill(s), please contact your pharmacy and have them  Contact your Endocrine team.  --------------------  For scheduling appointments call [facility] transplant :  [phone]. For lab appointments call [phone] or [facility].  --------------------  Please Note: If you are active on [facility], all future test results will be sent by [facility] message only, and will no longer be called to patient. You may also receive communication directly from your physician.

## 2019-03-05 NOTE — LETTER
"3/5/2019      RE: Kecia Blue  [address] [place]  [place] [address]       [facility]  Post - Lung Transplant Clinic Note  3/5/19           Patient: Kecia Blue  MRN: [id number]  : 1960  Transplant Date:3/1/18  (POD# 12)           Assessment and Plan:       1.  Status post bilateral lung transplant, performed on 2018 for interstitial lung disease associated with dermatomyositis, rheumatoid arthritis and pulmonary hypertension.  Her current immune suppressive medications are tacrolimus, azathioprine (dose recently decreased due to leukopenia), and prednisone.  These medications will be continued and adjusted according to our protocols.      2.  Right main bronchial stenosis.  Her most recent dilation was on 2018 and bronchoscopy will be repeated tomorrow.      3.  Infectious disease.  She is on Bactrim for PCP prophylaxis.  She is also on Valcyte 450 mg a day, following an episode of CMV reactivation in 2018.  Today's CMV PCR will be checked when available.        4.  History of PHS \"increased risk\" donor.  Followup labs will be checked at her 1 year anniversary.      5.  Chronic kidney disease, with today's creatinine in her baseline range.      6.  Anemia, improved since starting iron in 2018.  Iron will be decreased to once a day and continued at least for a few more months.      7.  Leukopenia, improved after decreasing azathioprine dose.      8.  Early satiety and a low appetite, improved with Marinol and p.r.n. Zofran.      9.  Hypertension, under good control with low-dose metoprolol.      10.  Bilateral knee arthritis.  Today, she has crepitations in the right knee.      11.  Health care maintenance.  She has already had her flu vaccine for this season.  She got a DTaP booster on 2018.  She will check with her insurance company to see if they cover the cost of the ShingRix.      12.  Inactivity.  The patient has a " friend who is a physical therapist and who can help her get into an aqua aerobics program near her home.   13.  History of right retinal vein occlusion, being followed by her personal ophthalmologist.     General Recommendations:  1. Encourage daily physical activity  2. Encourage po adequate po hydration (favor 1 ounce/kg)  2. Strongly encourage participation in Pulmonary Rehabilitation  3. Encourage continued follow-up with your Primary Care Physician for age- and gender-specific health care maintenance including yearly dermatological examination    Please also refer to RN Transplant Coordinator note for additional information related to this visit.    Encourage daily physical activity  RTC in 1 month for repeat physical exam, ROS, labs including tacrolimus level, spirometry, chest xray, cbc, and bmp.      Complexity indicators:     --immune compromised, on high-risk medications x   --organ transplant recipient x   --multiple organ transplant recipient     --active respiratory infection     --within one year of transplant; and/or within one month of hospitalization    --chronic lung allograft dysfunction syndrome (CLAD, chronic rejection, or bronchiolitis obliterans syndrome)     --new medical problem addressed during this visit     --multiple active medical problems x   --admitted directly to hospital from this clinic visit     -->50% of this visit was spent in counseling and care coordination. If yes, total visit time was             Alex Hale MD, [facility]  Transplant Pulmonologist   of Medicine  Division of Pulmonary, Allergy, Critical Care and Sleep Medicine  Department of Medicine  [facility]  Pager: [phone]  3/5/19            Subjective & Interval History:     Presents for routine clinic Patient is without new complaints.She denies palpitations, lightheadedness, fevers, chills, chest pain, N/V, diarrhea and constipation. She states tries to be  physically active on a daily basis.. She reports that her appetite is good and feels he is taking in adequate PO's             Review of Systems:     ROS: 10 point ROS neg other than the symptoms noted above in the HPI.         Allergies and Medications:      No Known Allergies    Current Outpatient Medications   Medication     ACETAMINOPHEN PO     azaTHIOprine (IMURAN) 50 MG tablet     calcium-vitamin D (CALTRATE) 600-400 MG-UNIT per tablet     dronabinol (MARINOL) 5 MG capsule     ferrous sulfate (FEROSUL) 325 (65 Fe) MG tablet     magnesium oxide (MAG-OX) 400 MG tablet     metoprolol tartrate (LOPRESSOR) 25 MG tablet     multivitamin, therapeutic with minerals (THERA-VIT-M) TABS tablet     ondansetron (ZOFRAN) 4 MG tablet     order for DME     order for DME     pantoprazole (PROTONIX) 40 MG EC tablet     pentoxifylline (TRENTAL) 400 MG CR tablet     predniSONE (DELTASONE) 2.5 MG tablet     predniSONE (DELTASONE) 5 MG tablet     sulfamethoxazole-trimethoprim (BACTRIM/SEPTRA) 400-80 MG tablet     tacrolimus (GENERIC EQUIVALENT) 0.5 MG capsule     tacrolimus (GENERIC EQUIVALENT) 1 MG capsule     valGANciclovir (VALCYTE) 450 MG tablet     ribavirin (COPEGUS) 200 MG tablet     No current facility-administered medications for this visit.               Physical Exam:     GEN: Awake and alert, in no acute distress, sitting upright in chair. Pleasant and cooperative  HEENT: Pupils equal and reactive to light, conjunctiva are pink conjunctivae, sclera anicteric,  Oral mucosa moist without lesions.  NECK: supple no JVD/LAD  PULM: Good air flow bilaterally, symmetric in expansion,CTAB now wheezing/rhonchi. Breathing non-labored.   CV: Normal S1 and S2. RRR.  No murmur, gallop, or rub.  No peripheral edema.  Peripheral pulses intact.   ABD: Soft, nontender, nondistended. Bowel sound normoactive, no guarding, no rebound.   MSK: .  No apparent muscle wasting. No clubbing, cyanosis, or edema  NEURO: Alert and oriented to person,  place, and time, moving all fours, gait not assessed  SKIN: Warm and dry. No visible rashes or lesions   PSYCH: Mood stable, judgment and insight appropriate.          Data:     All vital signs, laboratory and imaging data for the past 24 hours reviewed.      Vital signs: BP (!) 169/97 (BP Location: Left arm)   Pulse 89   Temp 98.3  F (36.8  C) (Oral)   Wt 52.6 kg (116 lb)   LMP 06/07/2014 (Exact Date)   SpO2 99%   BMI 19.91 kg/m       Wt Readings from Last 4 Encounters:   03/05/19 52.6 kg (116 lb)   01/17/19 47.1 kg (103 lb 13.4 oz)   01/16/19 48.1 kg (106 lb)   12/14/18 50.6 kg (111 lb 8.8 oz)     Results for MARIA D SARAI R (MRN [id number]) as of 3/5/2019 11:42   1/16/2019 09:00 1/23/2019 11:00 1/31/2019 11:40 2/7/2019 09:30 2/27/2019 10:13   Tacrolimus Level 9.5 8.5 7.5 11.7 9.9                              Weight trend: There were no vitals filed for this visit.     CXR 2V 3/5/2019 I have personally reviewed the examination and initial interpretation  and I agree with the findings.  IMPRESSION: No acute intrathoracic disease. Small pleural effusions.    Pulmonary Function Tests   Most Recent [facility]ze Pulmonary Function Testing    FVC-Pred   Date Value Ref Range Status   03/05/2019 3.23 L    01/16/2019 3.23 L    12/04/2018 3.23 L    11/19/2018 3.23 L    10/09/2018 3.24 L      FVC-Pre   Date Value Ref Range Status   03/05/2019 1.97 L    01/16/2019 1.92 L    12/04/2018 1.84 L    11/19/2018 1.67 L    10/09/2018 1.86 L      FVC-%Pred-Pre   Date Value Ref Range Status   03/05/2019 60 %    01/16/2019 59 %    12/04/2018 56 %    11/19/2018 51 %    10/09/2018 57 %      FEV1-Pre   Date Value Ref Range Status   03/05/2019 1.31 L    01/16/2019 1.04 L    12/04/2018 0.98 L    11/19/2018 0.54 L    10/09/2018 0.82 L      FEV1-%Pred-Pre   Date Value Ref Range Status   03/05/2019 51 %    01/16/2019 40 %    12/04/2018 37 %    11/19/2018 20 %    10/09/2018 31 %      FEV1FVC-Pred   Date Value Ref Range Status   03/05/2019 80  %    01/16/2019 79 %    12/04/2018 79 %    11/19/2018 79 %    10/09/2018 79 %      FEV1FVC-Pre   Date Value Ref Range Status   03/05/2019 67 %    01/16/2019 54 %    12/04/2018 53 %    11/19/2018 32 %    10/09/2018 44 %      No results found for: 43244  FEFMax-Pred   Date Value Ref Range Status   03/05/2019 6.42 L/sec    01/16/2019 6.41 L/sec    12/04/2018 6.42 L/sec    11/19/2018 6.42 L/sec    10/09/2018 6.43 L/sec      FEFMax-Pre   Date Value Ref Range Status   03/05/2019 3.59 L/sec    01/16/2019 3.36 L/sec    12/04/2018 1.94 L/sec    11/19/2018 1.16 L/sec    10/09/2018 2.33 L/sec      FEFMax-%Pred-Pre   Date Value Ref Range Status   03/05/2019 55 %    01/16/2019 52 %    12/04/2018 30 %    11/19/2018 18 %    10/09/2018 36 %      ExpTime-Pre   Date Value Ref Range Status   03/05/2019 7.25 sec    01/16/2019 6.93 sec    12/04/2018 7.49 sec    11/19/2018 9.71 sec    10/09/2018 6.96 sec      FIFMax-Pre   Date Value Ref Range Status   03/05/2019 2.57 L/sec    01/16/2019 2.05 L/sec    12/04/2018 2.33 L/sec    11/19/2018 1.54 L/sec    10/09/2018 1.74 L/sec      FEV1FEV6-Pred   Date Value Ref Range Status   03/05/2019 81 %    01/16/2019 81 %    12/04/2018 81 %    11/19/2018 81 %    10/09/2018 81 %      FEV1FEV6-Pre   Date Value Ref Range Status   03/05/2019 67 %    01/16/2019 54 %    12/04/2018 53 %    11/19/2018 36 %    10/09/2018 39 %         10/9/2018 13:47 11/19/2018 06:48 12/4/2018 11:21 1/16/2019 09:24 3/5/2019 09:48   FEV1-Pre 0.82 0.54 0.98 1.04 1.31   FEV1-%Pred-Pre 31 20 37 40 51     PFT 3/5/2019 interpretation: maneuver is valid and meets ATS guidelines  1. FVC is reduced The decrease in FVC may represent restrictive physiology.  Lung volumes would be necessary to determine.  2. FEV1 is reduced and consistent with moderate-severe obstruction  3. FEV1/FVC has a normal ratio with decreased FEV1 and FVC  Compared to prior: Her spirometry is significantly improved and represents her post-lTX best.               Historical CMV results (last 3 of prior testing):  Lab Results   Component Value Date    CMVQNT CMV DNA Not Detected 11/21/2018    CMVQNT CMV DNA Not Detected 11/19/2018    CMVQNT CMV DNA Not Detected 11/15/2018     Lab Results   Component Value Date    CMVLOG Not Calculated 11/21/2018    CMVLOG Not Calculated 11/19/2018    CMVLOG Not Calculated 11/15/2018     Patient was seen and examined by me. I personally reviewed the electronic medical record including labs, flowsheets, imaging reports and films, vitals, and medications. I discussed the case in detail with the post-LTX nurse coordinator    Clinical update was provided to the patient and his/her I answered all of their questions and provided them support    Alex Hale MD, [facility]  Transplant Pulmonologist   of Medicine  Division of Pulmonary, Allergy, Critical Care and Sleep Medicine  Department of Medicine  [facility]  Pager: [phone]  3/5/2019        Alex Hale MD

## 2019-03-06 ENCOUNTER — OFFICE VISIT (OUTPATIENT)
Dept: INFECTIOUS DISEASES | Facility: CLINIC | Age: 59
End: 2019-03-06
Attending: INTERNAL MEDICINE
Payer: COMMERCIAL

## 2019-03-06 ENCOUNTER — TELEPHONE (OUTPATIENT)
Dept: TRANSPLANT | Facility: CLINIC | Age: 59
End: 2019-03-06

## 2019-03-06 VITALS
OXYGEN SATURATION: 100 % | HEIGHT: 64 IN | BODY MASS INDEX: 19.62 KG/M2 | WEIGHT: 114.9 LBS | DIASTOLIC BLOOD PRESSURE: 94 MMHG | SYSTOLIC BLOOD PRESSURE: 167 MMHG | HEART RATE: 84 BPM | TEMPERATURE: 98 F

## 2019-03-06 DIAGNOSIS — Z23 NEED FOR VACCINATION FOR ZOSTER: ICD-10-CM

## 2019-03-06 DIAGNOSIS — B25.9 CYTOMEGALOVIRUS (CMV) VIREMIA (H): Primary | ICD-10-CM

## 2019-03-06 DIAGNOSIS — A42.0 PULMONARY ACTINOMYCOSIS (H): ICD-10-CM

## 2019-03-06 DIAGNOSIS — Z94.2 S/P LUNG TRANSPLANT (H): ICD-10-CM

## 2019-03-06 DIAGNOSIS — Z94.2 LUNG TRANSPLANT STATUS, BILATERAL (H): ICD-10-CM

## 2019-03-06 PROCEDURE — 25000581 ZZH RX MED A9270 GY (STAT IND- M) 250: Mod: ZF | Performed by: INTERNAL MEDICINE

## 2019-03-06 PROCEDURE — 90750 HZV VACC RECOMBINANT IM: CPT | Mod: ZF | Performed by: INTERNAL MEDICINE

## 2019-03-06 PROCEDURE — G0463 HOSPITAL OUTPT CLINIC VISIT: HCPCS | Mod: 25,ZF

## 2019-03-06 PROCEDURE — 90471 IMMUNIZATION ADMIN: CPT | Mod: ZF

## 2019-03-06 RX ORDER — TACROLIMUS 1 MG/1
4 CAPSULE ORAL 2 TIMES DAILY
Qty: 720 CAPSULE | Refills: 3 | Status: SHIPPED | OUTPATIENT
Start: 2019-03-06 | End: 2019-03-29

## 2019-03-06 RX ORDER — TACROLIMUS 0.5 MG/1
0.5 CAPSULE ORAL 2 TIMES DAILY
Qty: 180 CAPSULE | Refills: 3 | Status: SHIPPED | OUTPATIENT
Start: 2019-03-06 | End: 2019-03-29

## 2019-03-06 RX ADMIN — ZOSTER VACCINE RECOMBINANT, ADJUVANTED 0.5 ML: KIT at 15:25

## 2019-03-06 ASSESSMENT — MIFFLIN-ST. JEOR: SCORE: 1096.18

## 2019-03-06 ASSESSMENT — PAIN SCALES - GENERAL: PAINLEVEL: NO PAIN (0)

## 2019-03-06 NOTE — NURSING NOTE
"Chief Complaint   Patient presents with     RECHECK     CMV     BP (!) 167/94   Pulse 84   Temp 98  F (36.7  C) (Oral)   Ht 1.626 m (5' 4\")   Wt 52.1 kg (114 lb 14.4 oz)   LMP 06/07/2014 (Exact Date)   SpO2 100%   BMI 19.72 kg/m    Elli Anna MA    "

## 2019-03-06 NOTE — PROGRESS NOTES
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    Infectious Disease issues include:  - Actinomyces spp from BAL: Patient with positive BAL for Actinomyces spp on 8/2/2018 and most recently 1/17/2019. Has chronic cough productive of clear sputum without any shortness of breath or limitation of exercise. No worsening in the interval period that she has not been treated. Most recent CT chest on 11/19/2018 with 1.0cm RLL nodule and bilateral LL GGO consistent with atelectasis. CXR on 3/5 without infiltrates. Agree with continued monitoring off of therapy at this time.  - CMV Viremia: Patient with episode of symptomatic CMV viremia in Fall 2018 with peak CMV DNA PCR 10,000 at an outside laboratory. Also with positive PCR from BAL fluid. Treated at that time with resolution of viremia. Recurrence of viremia in 12/2018 at which time she was started on valganciclovir 450mg PO daily. She has been asymptomatic but had low level viremia. Yesterday decreased dose of imuran and increased dose of valganciclovir to 900mg PO BID. Agree with treatment at this time and will decrease dose once two negative CMV DNA PCRs.  - PCP prophylaxis: TMP/SMX  - Serostatus: CMV D+/R+, EBV D+/R+  - Immunization status: Will give shingrix #1 today. Otherwise Up to Date.  - Gamma globulin status: 698 on 3/1/2018  - Isolation status:  Good hand hygiene.    Patient seen and discussed with  "ID attending, Dr. Gurdeep Patel.  Alysha Grant MD, pgy8  Fellow in Pediatric and Adult Infectious Disease  pager:  [phone]           History of the Infectious Disease lllness:   Ms. 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 Previously seen by Dr. Audra Snyder and Dr. Ella Mccain in [id number]. Chart reviewed in detail since that time.    First had issues from CMV last fall at which point she had flu-like symptoms. She had C diff diarrhea at the same time and was hospitalized for three days with dehydration and failure to thrive. Feeling much better since the new year, having more energy and \"drive to be on the planet\". Has a cough productive of clear sputum mostly in the morning. No trouble breathing. She can walk as far as she wants to without getting short of breath. Can do stairs without getting short of breath. No symptoms when her numbers went up again in December. Also had RSV in January after her bronchoscopy but didn't have any worsening symptoms. Stopped imuran yesterday. Was taking valganciclovir 450mg PO daily from roughly December and increased dose to 900mg BID starting yesterday.       Transplants:  3/1/2018 (Lung); Postoperative day:  370.  Coordinator Zeny Tony    Review of Systems:  CONSTITUTIONAL:  No fevers. Positive chills. No night sweats.  EYES: negative for icterus, has some blurriness of her vision due to retinal vein occlusion  ENT:  negative for hearing loss, tinnitus or sore throat  RESPIRATORY:  negative for cough, sputum, dyspnea  CARDIOVASCULAR:  negative for chest pain, palpitations  GASTROINTESTINAL:  negative for nausea, vomiting, diarrhea or constipation  GENITOURINARY:  negative for dysuria  HEME:  No easy bruising  INTEGUMENT:  " negative for rash or pruritus  NEURO:  Negative for headache    Past Medical History:   Diagnosis Date     Antisynthetase syndrome (H) 2014     Chronic cough      Chronic infection     recent C diff  8/18     Dehydration 8/1/2018     Dermatomyositis (H)      Dysplasia of cervix, low grade (ESTRADA 1)      ILD (interstitial lung disease) (H)      Osteopenia      PONV (postoperative nausea and vomiting)      Pulmonary hypertension (H)      Raynaud's disease      Seronegative rheumatoid arthritis (H)        Past Surgical History:   Procedure Laterality Date     BRONCHOSCOPY (RIGID OR FLEXIBLE), DIAGNOSTIC N/A 4/10/2018    Procedure: COMBINED BRONCHOSCOPY (RIGID OR FLEXIBLE), LAVAGE;;  Surgeon: Mariposa Donohue MD;  Location: [facility]     BRONCHOSCOPY (RIGID OR FLEXIBLE), DILATE BRONCHUS / TRACHEA N/A 10/11/2018    Procedure: BRONCHOSCOPY (RIGID OR FLEXIBLE), DILATE BRONCHUS / TRACHEA;  Flexible And Rigid Bronchoscopy and Dilation;  Surgeon: Wilber Lin MD;  Location: [facility]     BRONCHOSCOPY FLEXIBLE N/A 3/13/2018    Procedure: BRONCHOSCOPY FLEXIBLE;  Flexible Bronchoscopy ;  Surgeon: Gissell Sanchez MD;  Location: [facility]     BRONCHOSCOPY FLEXIBLE N/A 5/9/2018    Procedure: BRONCHOSCOPY FLEXIBLE;;  Surgeon: Wilber Lin MD;  Location: [facility]     BRONCHOSCOPY FLEXIBLE AND RIGID N/A 9/10/2018    Procedure: BRONCHOSCOPY FLEXIBLE AND RIGID;  Flexible and Rigid Bronchoscopy with Balloon Dilation, tissue debulking with cryo, and Right mainstem bronchus stent placement;  Surgeon: Wilber Lin MD;  Location: [facility]     BRONCHOSCOPY RIGID N/A 6/6/2018    Procedure: BRONCHOSCOPY RIGID;;  Surgeon: Lopez Macias MD;  Location: [facility]     BRONCHOSCOPY, DILATE BRONCHUS, STENT BRONCHUS, COMBINED N/A 6/11/2018    Procedure: COMBINED BRONCHOSCOPY, DILATE BRONCHUS, STENT BRONCHUS;  Flexible Bronchoscopy, Balloon Dilation, Bronchial Washings;  Surgeon: Wilber Lin MD;  Location: [facility]      BRONCHOSCOPY, DILATE BRONCHUS, STENT BRONCHUS, COMBINED Right 7/10/2018    Procedure: COMBINED BRONCHOSCOPY, DILATE BRONCHUS, STENT BRONCHUS;  Flexible Bronchoscopy, Balloon Dilation, Bronchial Washings  ;  Surgeon: Wilber Lin MD;  [facility]: [facility]     BRONCHOSCOPY, DILATE BRONCHUS, STENT BRONCHUS, COMBINED N/A 8/2/2018    Procedure: COMBINED BRONCHOSCOPY, DILATE BRONCHUS, STENT BRONCHUS;  Flexible Bronchoscopy, Bronchial Washings, Balloon Dilation;  Surgeon: Wilber Lin MD;  [facility]: [facility]     BRONCHOSCOPY, DILATE BRONCHUS, STENT BRONCHUS, COMBINED N/A 8/20/2018    Procedure: COMBINED BRONCHOSCOPY, DILATE BRONCHUS, STENT BRONCHUS;  Flexible Bronchoscopy, Balloon Dilation;  Surgeon: Wilber Lin MD;  [facility]: [facility]     BRONCHOSCOPY, DILATE BRONCHUS, STENT BRONCHUS, COMBINED N/A 10/29/2018    Procedure: Flexible Bronchoscopy, Balloon Dilation, Stent Revision, Airway Examination And Therapeutic Suctioning, Cyro Tumor Debulking;  Surgeon: Wilber Lin MD;  Location: [facility]     BRONCHOSCOPY, DILATE BRONCHUS, STENT BRONCHUS, COMBINED N/A 11/20/2018    Procedure: Rigid Bronchoscopy, Stent Removal and dilitation;  Surgeon: Wilber Lin MD;  Location: [facility]     BRONCHOSCOPY, DILATE BRONCHUS, STENT BRONCHUS, COMBINED N/A 12/14/2018    Procedure: Flexible And Rigid Bronchoscopy, Balloon Dilation, Bronchial Washing;  Surgeon: Wilber Lin MD;  Location: [facility]     BRONCHOSCOPY, DILATE BRONCHUS, STENT BRONCHUS, COMBINED N/A 1/17/2019    Procedure: Flexible And Rigid Bronchoscopy, Balloon Dilation.;  Surgeon: Wilber Lin MD;  Location: [facility]     ENT SURGERY      tonsillectomy as a child     ESOPHAGOSCOPY, GASTROSCOPY, DUODENOSCOPY (EGD), COMBINED N/A 10/29/2018    Procedure: COMBINED ESOPHAGOSCOPY, GASTROSCOPY, DUODENOSCOPY (EGD) with biopsies and polypectomy;  Surgeon: Chente Bloom MD;  Location: [facility]     INSERT EXTRACORPORAL MEMBRANE  OXYGENATOR ADULT (OUTSIDE OR) N/A 2018    Procedure: INSERT EXTRACORPORAL MEMBRANE OXYGENATOR ADULT (OUTSIDE OR);  INSERT EXTRACORPORAL MEMBRANE OXYGENATOR ADULT (OUTSIDE OR) ;  Surgeon: Toby Hernandez MD;  Location:  OR     no prior surgery       REMOVE EXTRACORPORAL MEMBRANE OXYGENATOR ADULT N/A 3/3/2018    Procedure: REMOVE EXTRACORPORAL MEMBRANE OXYGENATOR ADULT;  Removal of Right Femoral Venous and Right Axillary Arterial Extracorporeal Membrane Oxygenator;  Surgeon: Toby Hernandez MD;  Location:  OR     TRANSPLANT LUNG RECIPIENT SINGLE X2 Bilateral 3/1/2018    Procedure: TRANSPLANT LUNG RECIPIENT SINGLE X2;  Median Sternotomy, Extracorporeal Membrane Oxygenator, bilateral sequential lung transplant;  Surgeon: Toby Hernandez MD;  Location:  OR       Family History   Problem Relation Age of Onset     Hypertension Mother      Arthritis Mother      Pancreatic Cancer Father      Diabetes Father        Social History     Social History Narrative    3/6/2019 - Lives with . Has three daughters. Four grandchildren (two ). No pets. Travelled previously to [facility]. Has visited [place] several times.      Social History     Tobacco Use     Smoking status: Never Smoker     Smokeless tobacco: Never Used   Substance Use Topics     Alcohol use: No     Alcohol/week: 0.6 oz     Types: 1 Glasses of wine per week     Drug use: No       Immunization History   Administered Date(s) Administered     Influenza Vaccine IM 3yrs+ 4 Valent IIV4 10/29/2018     Pneumo Conj 13-V (2010&after) 2016     Pneumococcal 23 valent 2014     TDAP Vaccine (Boostrix) 2018     Tdap (Adult) Unspecified Formulation 2008       Patient Active Problem List   Diagnosis     Acute on chronic respiratory failure with hypoxia (H)     ILD (interstitial lung disease) (H)     Lung transplant recipient (H)     Steroid-induced hyperglycemia     Deep vein thrombosis (DVT) (H)  [I82.409]     Encounter for long-term (current) use of high-risk medication     Dehydration     Acute kidney injury (H)     CMV (cytomegalovirus) (H)     Nausea and vomiting     Low hemoglobin       Current Outpatient Medications   Medication Sig     ACETAMINOPHEN PO Take 1,000 mg by mouth every 6 hours as needed for pain     calcium-vitamin D (CALTRATE) 600-400 MG-UNIT per tablet Take 1 tablet by mouth daily     ferrous sulfate (FEROSUL) 325 (65 Fe) MG tablet Take 1 tablet (325 mg) by mouth daily (with breakfast)     magnesium oxide (MAG-OX) 400 MG tablet Take 1 tablet (400 mg) by mouth daily     metoprolol tartrate (LOPRESSOR) 25 MG tablet Take 1 tablet (25 mg) by mouth 2 times daily     multivitamin, therapeutic with minerals (THERA-VIT-M) TABS tablet Take 1 tablet by mouth daily     ondansetron (ZOFRAN) 4 MG tablet Take 1 tablet (4 mg) by mouth every 12 hours as needed for nausea     order for DME Equipment being ordered: Nebulizer     order for DME Equipment being ordered: Nebulizer     pantoprazole (PROTONIX) 40 MG EC tablet Take 1 tablet (40 mg) by mouth 2 times daily     pentoxifylline (TRENTAL) 400 MG CR tablet Take 1 tablet (400 mg) by mouth 2 times daily     predniSONE (DELTASONE) 2.5 MG tablet Take two tablets (5mg) every AM and one tablet (2.5mg) every evening.     predniSONE (DELTASONE) 5 MG tablet Take 5mg in AM and 2.5mg in AM..     sulfamethoxazole-trimethoprim (BACTRIM/SEPTRA) 400-80 MG tablet Take 1 tablet by mouth daily     tacrolimus (GENERIC EQUIVALENT) 0.5 MG capsule Take 1 capsule (0.5 mg) by mouth 2 times daily With four (1mg) caps twice daily for total dose of 4.5mg twice daily     tacrolimus (GENERIC EQUIVALENT) 1 MG capsule Take 4 capsules (4 mg) by mouth 2 times daily With one (0.5mg) cap twice daily for total dose of 4.5mg twice daily     valGANciclovir (VALCYTE) 450 MG tablet Take 2 tablets (900 mg) by mouth 2 times daily     No current facility-administered medications for this visit.  "       No Known Allergies           Physical Exam:   Vitals were reviewed.  All vitals stable  BP (!) 167/94   Pulse 84   Temp 98  F (36.7  C) (Oral)   Ht 1.626 m (5' 4\")   Wt 52.1 kg (114 lb 14.4 oz)   LMP 06/07/2014 (Exact Date)   SpO2 100%   BMI 19.72 kg/m      Exam:   GENERAL:  well-developed, well-nourished, alert, oriented, in no acute distress.  HEENT:  Head is normocephalic, atraumatic   EYES:  Eyes have anicteric sclerae.    ENT:  Oropharynx is moist without exudates or ulcers.  NECK:  Supple.  LUNGS:  Clear to auscultation.  CARDIOVASCULAR:  Regular rate and rhythm with no murmurs, gallops or rubs.  SKIN:  No acute rashes.  NEUROLOGIC:  Grossly nonfocal.         Laboratory Data:       Metabolic Studies       Recent Labs   Lab Test 03/05/19  0932 01/16/19  0900 12/14/18  0951 12/13/18  0727 12/04/18  1102 11/19/18  0638 10/29/18  0549  08/03/18  0624  03/24/18  0535  03/04/18  1953 03/04/18  1603  03/04/18  0353  03/01/18 2017    134  --  134 132* 133 135   < > 136   < > 137   < > 150* 150*   < > 150*   < > 150*   POTASSIUM 3.7 4.3 4.5 5.3 4.3 4.7 3.8   < > 4.9   < > 5.3   < > 4.2 4.2   < > 4.2   < > 3.4   CHLORIDE 102 101  --  101 100 96 101   < > 108   < > 103   < > 119* 119*   < > 117*   < > 119*   CO2 26 25  --  26 18* 30 23   < > 20   < > 24   < > 22 22   < > 20   < > 24   ANIONGAP 7 8  --  8 14 6 11   < > 8   < > 10   < > 9 8   < > 13   < > 7   BUN 21 22  --  28 30 20 25   < > 23   < > 34*   < > 71* 73*   < > 69*   < > 30   CR 0.89 1.25* 1.16* 1.66* 1.13* 1.51* 1.11*   < > 1.23*   < > 1.15*   < > 2.13* 2.08*   < > 2.14*   < > 0.95   GFRESTIMATED 72 48* 48* 32* 50* 36* 51*   < > 45*   < > 49*   < > 24* 25*   < > 24*   < > 61   * 98 103* 96 86 109* 117*   < > 108*   < > 131*   < > 111* 148*   < > 133*   < > 232*   A1C  --   --   --   --   --   --   --   --   --   --   --   --   --   --   --   --   --  5.5   CHELSEY 8.6 8.8  --  8.6 9.4 9.2 9.1   < > 8.2*   < > 8.6   < > 7.2* 7.4*   < > " 7.8*   < > 6.8*   PHOS  --   --   --   --   --   --   --   --  2.6  --  3.6   < >  --   --   --  5.0*   < > 3.9   MAG 1.6 1.5*  --  1.6 2.0 1.8  --    < > 1.7   < > 1.9   < > 2.5*  --   --  2.8*   < > 1.9   LACT  --   --   --   --   --   --   --   --   --   --   --   --  0.8 0.8   < > 1.0   < > 1.8   CKT  --   --   --   --   --   --   --   --   --   --   --   --   --   --   --  207  --   --     < > = values in this interval not displayed.       Hematology Studies      Recent Labs   Lab Test 03/05/19  0932 01/16/19  0900 12/14/18  0951 12/13/18  1033 12/13/18  0727 12/04/18  1102  10/29/18  0549 10/09/18  1257  08/04/18  0709  08/03/18  0624  08/02/18  1100   WBC 3.2* 4.5 1.4* 1.6* 1.8* 3.9*   < > 8.9 11.2*   < > 6.8  --  5.9  --  6.1   ANEU  --   --   --  0.9*  --   --   --  8.2 10.3*  --  6.5  --  5.8  --  5.7   ALYM  --   --   --  0.2*  --   --   --  0.2* 0.3*  --  0.2*  --  0.1*  --  0.2*   SHERRI  --   --   --  0.4  --   --   --  0.3 0.3  --  0.1  --  0.0  --  0.1   AEOS  --   --   --  0.0  --   --   --  0.0 0.0  --  0.0  --  0.0  --  0.0   HGB 8.1* 9.2* 7.3* 6.6* 6.5* 7.6*   < > 7.8* 7.9*   < > 7.5*   < > 7.3*   < > 6.5*   HCT 27.3* 30.7* 24.1* 22.2* 22.2* 25.1*   < > 26.0* 27.0*   < > 24.4*  --  23.6*  --  21.6*    385 631* 773* 674* 610*   < > 385 520*   < > 324  --  300  --  301    < > = values in this interval not displayed.       Clotting Studies    Recent Labs   Lab Test 03/05/19  0932 12/13/18  0727 12/04/18  1102 11/19/18  0638  08/03/18  0624  06/11/18  0925  03/05/18  1648 03/05/18  1132   INR 1.04 1.02 0.98 0.89   < > 1.09   < > 0.92   < > 1.11 1.13   PTT  --   --   --   --   --  26  --  26  --  24 27    < > = values in this interval not displayed.       Urine Studies     Recent Labs   Lab Test 03/04/18  1425 03/01/18  0528 02/23/18  1820 02/21/18  1500 02/19/18  0822   URINEPH 5.5 5.5 6.5 7.5* 5.0   NITRITE Negative Negative Negative Negative Negative   LEUKEST Negative Negative Negative  Negative Trace*   WBCU 5 4 <1 <1 5*       Microbiology:  Last 6 Culture results with specimen source  Culture Micro   Date Value Ref Range Status   01/17/2019 (A)  Final    Moderate growth  Actinomyces odontolyticus  This organism is part of normal alo, but on occasion, may be a true pathogen. Clinical   correlation must be applied to interpreting this microbiology result.  Susceptibility testing not routinely done     01/17/2019 Heavy growth  Normal respiratory alo    Final   01/17/2019   Preliminary    Culture received and in progress.  Positive AFB results are called as soon as detected.    Final report to follow in 7 to 8 weeks.     01/17/2019   Preliminary    Assayed at [facility] [address] [place] [address] [phone]   01/17/2019 Culture negative after 4 weeks  Final   01/17/2019 No growth after 4 weeks  Final   01/17/2019 Moderate growth  Normal respiratory alo    Final    Specimen Description   Date Value Ref Range Status   01/17/2019 Bronchial washing Right SPECIMEN 1  Final   01/17/2019 Right BROW  Final   01/17/2019 Right BROW  Final   01/17/2019 Bronchial washing Right SPECIMEN 1  Final   01/17/2019 Bronchial washing Right SPECIMEN 1  Final   01/17/2019 Bronchial washing Right SPECIMEN 1  Final   01/17/2019 Bronchial washing Right SPECIMEN 1  Final   01/17/2019 Bronchial washing Right SPECIMEN 1  Final        Last check of C difficile  C Diff Toxin B PCR   Date Value Ref Range Status   12/04/2018 Negative NEG^Negative Final     Comment:     Negative: Clostridium difficile target DNA sequences NOT detected, presumed   negative for Clostridium difficile toxin B or the number of bacteria present   may be below the limit of detection for the test.  FDA approved assay performed using [employer] GeneXpert real-time PCR.  A negative result does not exclude actual disease due to Clostridium difficile   and may be due to improper collection, handling and storage of the specimen   or the  number of organisms in the specimen is below the detection limit of the   assay.         EBV DNA Copies/mL   Date Value Ref Range Status   08/01/2018 EBV DNA Not Detected EBVNEG^EBV DNA Not Detected [Copies]/mL Final       Hepatitis B Testing     Recent Labs   Lab Test 06/05/18  1029 03/01/18  0356 02/19/18  0759   HBCAB  --  Nonreactive Nonreactive   HEPBANG Nonreactive Nonreactive Nonreactive        Hepatitis C Antibody   Date Value Ref Range Status   02/19/2018 Nonreactive NR^Nonreactive Final     Comment:     Assay performance characteristics have not been established for newborns,   infants, and children         CMV Antibody IgG   Date Value Ref Range Status   03/01/2018 Canceled, Test credited 0.0 - 0.8 AI Final     Comment:     Test reordered as correct code  SEE CMV QUANTITATIVE PCR     03/01/2018 >8.0 (H) 0.0 - 0.8 AI Final     Comment:     Positive  Antibody index (AI) values reflect qualitative changes in antibody   concentration that cannot be directly associated with clinical condition or   disease state.     02/19/2018 >8.0 (H) 0.0 - 0.8 AI Final     Comment:     Positive  Antibody index (AI) values reflect qualitative changes in antibody   concentration that cannot be directly associated with clinical condition or   disease state.         Toxoplasma Antibody IgG   Date Value Ref Range Status   02/19/2018 <3.0 0.0 - 7.1 IU/mL Final     Comment:     Negative- Absence of detectable Toxoplasma gondii IgG antibodies. A negative   result does not rule out acute infection.  The magnitude of the measured result is not indicative of the amount of   antibody present. The concentrations of anti-Toxoplasma gondii IgG in a given   specimen determined with assays from different manufacturers can vary due to   differences in assay methods and reagent specificity.         Imaging:  Results for orders placed or performed in visit on 03/05/19   X-ray Chest 2 vws*    Narrative    EXAMINATION:  XR CHEST 2 VW 3/5/2019  9:17 AM.    COMPARISON: 1/16/2019.    HISTORY:  Lung transplant recipient (H). Bilateral lung  transplantation performed 3/1/2018.    FINDINGS: Upright PA and lateral radiographs of the chest.  Postsurgical changes of bilateral lung transplantation with intact  median sternotomy wires and mediastinal surgical clips. Unchanged  surgical clips projecting over the right apex. Stable cardiac  mediastinal silhouette. Unchanged trace bilateral pleural effusions.  No pneumothorax. No new focal pulmonary opacity. Moderate degenerative  changes of the thoracolumbar spine. Mild to moderate gaseous  distention of the stomach and small bowel.      Impression    IMPRESSION:   1. Stable postsurgical changes of bilateral lung transplantation.  2. Unchanged small bilateral pleural effusions.    I have personally reviewed the examination and initial interpretation  and I agree with the findings.    ADAM GONZALEZ MD

## 2019-03-06 NOTE — TELEPHONE ENCOUNTER
Tacrolimus level 7.9 at 12 hours, on 3-5-19  Goal 9-11.   Current dose 4 mg in AM, 4.5 mg in PM    Dose changed to  4.5 mg in AM, 4.5 mg in PM   Recheck level in 7-10 days    Discussed with patient   MyChart message sent

## 2019-03-06 NOTE — LETTER
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    Infectious Disease issues include:  - Actinomyces spp from BAL: Patient with positive BAL for Actinomyces spp on 8/2/2018 and most recently 1/17/2019. Has chronic cough productive of clear sputum without any shortness of breath or limitation of exercise. No worsening in the interval period that she has not been treated. Most recent CT chest on 11/19/2018 with 1.0cm RLL nodule and bilateral LL GGO consistent with atelectasis. CXR on 3/5 without infiltrates. Agree with continued monitoring off of therapy at this time.  - CMV Viremia: Patient with episode of symptomatic CMV viremia in Fall 2018 with peak CMV DNA PCR 10,000 at an outside laboratory. Also with positive PCR from BAL fluid. Treated at that time with resolution of viremia. Recurrence of viremia in 12/2018 at which time she was started on valganciclovir 450mg PO daily. She has been asymptomatic but had low level viremia. Yesterday decreased dose of imuran and increased dose of valganciclovir to 900mg PO BID. Agree with treatment at this time and will decrease dose once two negative CMV DNA PCRs.  - PCP prophylaxis: TMP/SMX  - Serostatus: CMV D+/R+, EBV D+/R+  - Immunization status: Will give shingrix #1 today. Otherwise Up to Date.  - Gamma globulin status:  "698 on 3/1/2018  - Isolation status:  Good hand hygiene.    Patient seen and discussed with ID attending, Dr. Gurdeep Patel.  Alysha Grant MD, [id number]  Fellow in Pediatric and Adult Infectious Disease  pager:  [phone]           History of the Infectious Disease lllness:   Ms. 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 Previously seen by Dr. Audra Snyder and Dr. Ella Mccain in [id number]/[id number]. Chart reviewed in detail since that time.    First had issues from CMV last fall at which point she had flu-like symptoms. She had C diff diarrhea at the same time and was hospitalized for three days with dehydration and failure to thrive. Feeling much better since the new year, having more energy and \"drive to be on the planet\". Has a cough productive of clear sputum mostly in the morning. No trouble breathing. She can walk as far as she wants to without getting short of breath. Can do stairs without getting short of breath. No symptoms when her numbers went up again in December. Also had RSV in January after her bronchoscopy but didn't have any worsening symptoms. Stopped imuran yesterday. Was taking valganciclovir 450mg PO daily from roughly December and increased dose to 900mg BID starting yesterday.       Transplants:  3/1/2018 (Lung); Postoperative day:  370.  Coordinator Zeny Tony    Review of Systems:  CONSTITUTIONAL:  No fevers. Positive chills. No night sweats.  EYES: negative for icterus, has some blurriness of her vision due to retinal vein occlusion  ENT:  negative for hearing loss, tinnitus or sore throat  RESPIRATORY:  negative for cough, sputum, dyspnea  CARDIOVASCULAR:  negative for chest pain, palpitations  GASTROINTESTINAL:  negative for nausea, vomiting, diarrhea " or constipation  GENITOURINARY:  negative for dysuria  HEME:  No easy bruising  INTEGUMENT:  negative for rash or pruritus  NEURO:  Negative for headache    Past Medical History:   Diagnosis Date     Antisynthetase syndrome (H) 2014     Chronic cough      Chronic infection     recent C diff  8/18     Dehydration 8/1/2018     Dermatomyositis (H)      Dysplasia of cervix, low grade (ESTRADA 1)      ILD (interstitial lung disease) (H)      Osteopenia      PONV (postoperative nausea and vomiting)      Pulmonary hypertension (H)      Raynaud's disease      Seronegative rheumatoid arthritis (H)        Past Surgical History:   Procedure Laterality Date     BRONCHOSCOPY (RIGID OR FLEXIBLE), DIAGNOSTIC N/A 4/10/2018    Procedure: COMBINED BRONCHOSCOPY (RIGID OR FLEXIBLE), LAVAGE;;  Surgeon: Mariposa Donohue MD;  Location: [facility]     BRONCHOSCOPY (RIGID OR FLEXIBLE), DILATE BRONCHUS / TRACHEA N/A 10/11/2018    Procedure: BRONCHOSCOPY (RIGID OR FLEXIBLE), DILATE BRONCHUS / TRACHEA;  Flexible And Rigid Bronchoscopy and Dilation;  Surgeon: Wilber Lin MD;  Location: [facility]     BRONCHOSCOPY FLEXIBLE N/A 3/13/2018    Procedure: BRONCHOSCOPY FLEXIBLE;  Flexible Bronchoscopy ;  Surgeon: Gissell Sanchez MD;  Location: [facility]     BRONCHOSCOPY FLEXIBLE N/A 5/9/2018    Procedure: BRONCHOSCOPY FLEXIBLE;;  Surgeon: Wilber Lin MD;  Location: [facility]     BRONCHOSCOPY FLEXIBLE AND RIGID N/A 9/10/2018    Procedure: BRONCHOSCOPY FLEXIBLE AND RIGID;  Flexible and Rigid Bronchoscopy with Balloon Dilation, tissue debulking with cryo, and Right mainstem bronchus stent placement;  Surgeon: Wilber Lin MD;  Location: [facility]     BRONCHOSCOPY RIGID N/A 6/6/2018    Procedure: BRONCHOSCOPY RIGID;;  Surgeon: Lopez Macias MD;  Location: [facility]     BRONCHOSCOPY, DILATE BRONCHUS, STENT BRONCHUS, COMBINED N/A 6/11/2018    Procedure: COMBINED BRONCHOSCOPY, DILATE BRONCHUS, STENT BRONCHUS;  Flexible Bronchoscopy,  Balloon Dilation, Bronchial Washings;  Surgeon: Wilber Lin MD;  Location: [facility]     BRONCHOSCOPY, DILATE BRONCHUS, STENT BRONCHUS, COMBINED Right 7/10/2018    Procedure: COMBINED BRONCHOSCOPY, DILATE BRONCHUS, STENT BRONCHUS;  Flexible Bronchoscopy, Balloon Dilation, Bronchial Washings  ;  Surgeon: Wilber Lin MD;  [facility]: [facility]     BRONCHOSCOPY, DILATE BRONCHUS, STENT BRONCHUS, COMBINED N/A 8/2/2018    Procedure: COMBINED BRONCHOSCOPY, DILATE BRONCHUS, STENT BRONCHUS;  Flexible Bronchoscopy, Bronchial Washings, Balloon Dilation;  Surgeon: Wilber Lin MD;  [facility]: [facility]     BRONCHOSCOPY, DILATE BRONCHUS, STENT BRONCHUS, COMBINED N/A 8/20/2018    Procedure: COMBINED BRONCHOSCOPY, DILATE BRONCHUS, STENT BRONCHUS;  Flexible Bronchoscopy, Balloon Dilation;  Surgeon: Wilber Lin MD;  [facility]: [facility]     BRONCHOSCOPY, DILATE BRONCHUS, STENT BRONCHUS, COMBINED N/A 10/29/2018    Procedure: Flexible Bronchoscopy, Balloon Dilation, Stent Revision, Airway Examination And Therapeutic Suctioning, Cyro Tumor Debulking;  Surgeon: Wilber Lin MD;  Location: [facility]     BRONCHOSCOPY, DILATE BRONCHUS, STENT BRONCHUS, COMBINED N/A 11/20/2018    Procedure: Rigid Bronchoscopy, Stent Removal and dilitation;  Surgeon: Wilber Lin MD;  Location: [facility]     BRONCHOSCOPY, DILATE BRONCHUS, STENT BRONCHUS, COMBINED N/A 12/14/2018    Procedure: Flexible And Rigid Bronchoscopy, Balloon Dilation, Bronchial Washing;  Surgeon: Wilber Lin MD;  Location: [facility]     BRONCHOSCOPY, DILATE BRONCHUS, STENT BRONCHUS, COMBINED N/A 1/17/2019    Procedure: Flexible And Rigid Bronchoscopy, Balloon Dilation.;  Surgeon: Wilber Lin MD;  Location: [facility]     ENT SURGERY      tonsillectomy as a child     ESOPHAGOSCOPY, GASTROSCOPY, DUODENOSCOPY (EGD), COMBINED N/A 10/29/2018    Procedure: COMBINED ESOPHAGOSCOPY, GASTROSCOPY, DUODENOSCOPY (EGD) with biopsies and polypectomy;   Surgeon: Chente Bloom MD;  Location:  OR     INSERT EXTRACORPORAL MEMBRANE OXYGENATOR ADULT (OUTSIDE OR) N/A 2018    Procedure: INSERT EXTRACORPORAL MEMBRANE OXYGENATOR ADULT (OUTSIDE OR);  INSERT EXTRACORPORAL MEMBRANE OXYGENATOR ADULT (OUTSIDE OR) ;  Surgeon: Toby Hernandez MD;  Location:  OR     no prior surgery       REMOVE EXTRACORPORAL MEMBRANE OXYGENATOR ADULT N/A 3/3/2018    Procedure: REMOVE EXTRACORPORAL MEMBRANE OXYGENATOR ADULT;  Removal of Right Femoral Venous and Right Axillary Arterial Extracorporeal Membrane Oxygenator;  Surgeon: Toby Hernandez MD;  Location:  OR     TRANSPLANT LUNG RECIPIENT SINGLE X2 Bilateral 3/1/2018    Procedure: TRANSPLANT LUNG RECIPIENT SINGLE X2;  Median Sternotomy, Extracorporeal Membrane Oxygenator, bilateral sequential lung transplant;  Surgeon: Toby Hernandez MD;  Location:  OR       Family History   Problem Relation Age of Onset     Hypertension Mother      Arthritis Mother      Pancreatic Cancer Father      Diabetes Father        Social History     Social History Narrative    3/6/2019 - Lives with . Has three daughters. Four grandchildren (two ). No pets. Travelled previously to [place] [facility] [place]. Has visited [place] several times.      Social History     Tobacco Use     Smoking status: Never Smoker     Smokeless tobacco: Never Used   Substance Use Topics     Alcohol use: No     Alcohol/week: 0.6 oz     Types: 1 Glasses of wine per week     Drug use: No       Immunization History   Administered Date(s) Administered     Influenza Vaccine IM 3yrs+ 4 Valent IIV4 10/29/2018     Pneumo Conj 13-V (2010&after) 2016     Pneumococcal 23 valent 2014     TDAP Vaccine (Boostrix) 2018     Tdap (Adult) Unspecified Formulation 2008       Patient Active Problem List   Diagnosis     Acute on chronic respiratory failure with hypoxia (H)     ILD (interstitial lung disease) (H)     Lung  transplant recipient (H)     Steroid-induced hyperglycemia     Deep vein thrombosis (DVT) (H) [I82.409]     Encounter for long-term (current) use of high-risk medication     Dehydration     Acute kidney injury (H)     CMV (cytomegalovirus) (H)     Nausea and vomiting     Low hemoglobin       Current Outpatient Medications   Medication Sig     ACETAMINOPHEN PO Take 1,000 mg by mouth every 6 hours as needed for pain     calcium-vitamin D (CALTRATE) 600-400 MG-UNIT per tablet Take 1 tablet by mouth daily     ferrous sulfate (FEROSUL) 325 (65 Fe) MG tablet Take 1 tablet (325 mg) by mouth daily (with breakfast)     magnesium oxide (MAG-OX) 400 MG tablet Take 1 tablet (400 mg) by mouth daily     metoprolol tartrate (LOPRESSOR) 25 MG tablet Take 1 tablet (25 mg) by mouth 2 times daily     multivitamin, therapeutic with minerals (THERA-VIT-M) TABS tablet Take 1 tablet by mouth daily     ondansetron (ZOFRAN) 4 MG tablet Take 1 tablet (4 mg) by mouth every 12 hours as needed for nausea     order for DME Equipment being ordered: Nebulizer     order for DME Equipment being ordered: Nebulizer     pantoprazole (PROTONIX) 40 MG EC tablet Take 1 tablet (40 mg) by mouth 2 times daily     pentoxifylline (TRENTAL) 400 MG CR tablet Take 1 tablet (400 mg) by mouth 2 times daily     predniSONE (DELTASONE) 2.5 MG tablet Take two tablets (5mg) every AM and one tablet (2.5mg) every evening.     predniSONE (DELTASONE) 5 MG tablet Take 5mg in AM and 2.5mg in AM..     sulfamethoxazole-trimethoprim (BACTRIM/SEPTRA) 400-80 MG tablet Take 1 tablet by mouth daily     tacrolimus (GENERIC EQUIVALENT) 0.5 MG capsule Take 1 capsule (0.5 mg) by mouth 2 times daily With four (1mg) caps twice daily for total dose of 4.5mg twice daily     tacrolimus (GENERIC EQUIVALENT) 1 MG capsule Take 4 capsules (4 mg) by mouth 2 times daily With one (0.5mg) cap twice daily for total dose of 4.5mg twice daily     valGANciclovir (VALCYTE) 450 MG tablet Take 2 tablets  "(900 mg) by mouth 2 times daily     No current facility-administered medications for this visit.        No Known Allergies           Physical Exam:   Vitals were reviewed.  All vitals stable  BP (!) 167/94   Pulse 84   Temp 98  F (36.7  C) (Oral)   Ht 1.626 m (5' 4\")   Wt 52.1 kg (114 lb 14.4 oz)   LMP 06/07/2014 (Exact Date)   SpO2 100%   BMI 19.72 kg/m       Exam:   GENERAL:  well-developed, well-nourished, alert, oriented, in no acute distress.  HEENT:  Head is normocephalic, atraumatic   EYES:  Eyes have anicteric sclerae.    ENT:  Oropharynx is moist without exudates or ulcers.  NECK:  Supple.  LUNGS:  Clear to auscultation.  CARDIOVASCULAR:  Regular rate and rhythm with no murmurs, gallops or rubs.  SKIN:  No acute rashes.  NEUROLOGIC:  Grossly nonfocal.         Laboratory Data:       Metabolic Studies       Recent Labs   Lab Test 03/05/19  0932 01/16/19  0900 12/14/18  0951 12/13/18  0727 12/04/18  1102 11/19/18  0638 10/29/18  0549  08/03/18  0624  03/24/18  0535  03/04/18  1953 03/04/18  1603  03/04/18  0353  03/01/18 2017    134  --  134 132* 133 135   < > 136   < > 137   < > 150* 150*   < > 150*   < > 150*   POTASSIUM 3.7 4.3 4.5 5.3 4.3 4.7 3.8   < > 4.9   < > 5.3   < > 4.2 4.2   < > 4.2   < > 3.4   CHLORIDE 102 101  --  101 100 96 101   < > 108   < > 103   < > 119* 119*   < > 117*   < > 119*   CO2 26 25  --  26 18* 30 23   < > 20   < > 24   < > 22 22   < > 20   < > 24   ANIONGAP 7 8  --  8 14 6 11   < > 8   < > 10   < > 9 8   < > 13   < > 7   BUN 21 22  --  28 30 20 25   < > 23   < > 34*   < > 71* 73*   < > 69*   < > 30   CR 0.89 1.25* 1.16* 1.66* 1.13* 1.51* 1.11*   < > 1.23*   < > 1.15*   < > 2.13* 2.08*   < > 2.14*   < > 0.95   GFRESTIMATED 72 48* 48* 32* 50* 36* 51*   < > 45*   < > 49*   < > 24* 25*   < > 24*   < > 61   * 98 103* 96 86 109* 117*   < > 108*   < > 131*   < > 111* 148*   < > 133*   < > 232*   A1C  --   --   --   --   --   --   --   --   --   --   --   --   --   --  "  --   --   --  5.5   CHELSEY 8.6 8.8  --  8.6 9.4 9.2 9.1   < > 8.2*   < > 8.6   < > 7.2* 7.4*   < > 7.8*   < > 6.8*   PHOS  --   --   --   --   --   --   --   --  2.6  --  3.6   < >  --   --   --  5.0*   < > 3.9   MAG 1.6 1.5*  --  1.6 2.0 1.8  --    < > 1.7   < > 1.9   < > 2.5*  --   --  2.8*   < > 1.9   LACT  --   --   --   --   --   --   --   --   --   --   --   --  0.8 0.8   < > 1.0   < > 1.8   CKT  --   --   --   --   --   --   --   --   --   --   --   --   --   --   --  207  --   --     < > = values in this interval not displayed.       Hematology Studies      Recent Labs   Lab Test 03/05/19  0932 01/16/19  0900 12/14/18  0951 12/13/18  1033 12/13/18  0727 12/04/18  1102  10/29/18  0549 10/09/18  1257  08/04/18  0709  08/03/18  0624  08/02/18  1100   WBC 3.2* 4.5 1.4* 1.6* 1.8* 3.9*   < > 8.9 11.2*   < > 6.8  --  5.9  --  6.1   ANEU  --   --   --  0.9*  --   --   --  8.2 10.3*  --  6.5  --  5.8  --  5.7   ALYM  --   --   --  0.2*  --   --   --  0.2* 0.3*  --  0.2*  --  0.1*  --  0.2*   SHERRI  --   --   --  0.4  --   --   --  0.3 0.3  --  0.1  --  0.0  --  0.1   AEOS  --   --   --  0.0  --   --   --  0.0 0.0  --  0.0  --  0.0  --  0.0   HGB 8.1* 9.2* 7.3* 6.6* 6.5* 7.6*   < > 7.8* 7.9*   < > 7.5*   < > 7.3*   < > 6.5*   HCT 27.3* 30.7* 24.1* 22.2* 22.2* 25.1*   < > 26.0* 27.0*   < > 24.4*  --  23.6*  --  21.6*    385 631* 773* 674* 610*   < > 385 520*   < > 324  --  300  --  301    < > = values in this interval not displayed.       Clotting Studies    Recent Labs   Lab Test 03/05/19  0932 12/13/18  0727 12/04/18  1102 11/19/18  0638  08/03/18  0624  06/11/18  0925  03/05/18  1648 03/05/18  1132   INR 1.04 1.02 0.98 0.89   < > 1.09   < > 0.92   < > 1.11 1.13   PTT  --   --   --   --   --  26  --  26  --  24 27    < > = values in this interval not displayed.       Urine Studies     Recent Labs   Lab Test 03/04/18  1425 03/01/18  0528 02/23/18  1820 02/21/18  1500 02/19/18  0822   URINEPH 5.5 5.5 6.5 7.5* 5.0    NITRITE Negative Negative Negative Negative Negative   LEUKEST Negative Negative Negative Negative Trace*   WBCU 5 4 <1 <1 5*       Microbiology:  Last 6 Culture results with specimen source  Culture Micro   Date Value Ref Range Status   01/17/2019 (A)  Final    Moderate growth  Actinomyces odontolyticus  This organism is part of normal alo, but on occasion, may be a true pathogen. Clinical   correlation must be applied to interpreting this microbiology result.  Susceptibility testing not routinely done     01/17/2019 Heavy growth  Normal respiratory alo    Final   01/17/2019   Preliminary    Culture received and in progress.  Positive AFB results are called as soon as detected.    Final report to follow in 7 to 8 weeks.     01/17/2019   Preliminary    Assayed at [facility] [address] [place] [id number] [phone]   01/17/2019 Culture negative after 4 weeks  Final   01/17/2019 No growth after 4 weeks  Final   01/17/2019 Moderate growth  Normal respiratory alo    Final    Specimen Description   Date Value Ref Range Status   01/17/2019 Bronchial washing Right SPECIMEN 1  Final   01/17/2019 Right BROW  Final   01/17/2019 Right BROW  Final   01/17/2019 Bronchial washing Right SPECIMEN 1  Final   01/17/2019 Bronchial washing Right SPECIMEN 1  Final   01/17/2019 Bronchial washing Right SPECIMEN 1  Final   01/17/2019 Bronchial washing Right SPECIMEN 1  Final   01/17/2019 Bronchial washing Right SPECIMEN 1  Final        Last check of C difficile  C Diff Toxin B PCR   Date Value Ref Range Status   12/04/2018 Negative NEG^Negative Final     Comment:     Negative: Clostridium difficile target DNA sequences NOT detected, presumed   negative for Clostridium difficile toxin B or the number of bacteria present   may be below the limit of detection for the test.  FDA approved assay performed using [employer] GeneXpert real-time PCR.  A negative result does not exclude actual disease due to Clostridium  difficile   and may be due to improper collection, handling and storage of the specimen   or the number of organisms in the specimen is below the detection limit of the   assay.         EBV DNA Copies/mL   Date Value Ref Range Status   08/01/2018 EBV DNA Not Detected EBVNEG^EBV DNA Not Detected [Copies]/mL Final       Hepatitis B Testing     Recent Labs   Lab Test 06/05/18  1029 03/01/18  0356 02/19/18  0759   HBCAB  --  Nonreactive Nonreactive   HEPBANG Nonreactive Nonreactive Nonreactive        Hepatitis C Antibody   Date Value Ref Range Status   02/19/2018 Nonreactive NR^Nonreactive Final     Comment:     Assay performance characteristics have not been established for newborns,   infants, and children         CMV Antibody IgG   Date Value Ref Range Status   03/01/2018 Canceled, Test credited 0.0 - 0.8 AI Final     Comment:     Test reordered as correct code  SEE CMV QUANTITATIVE PCR     03/01/2018 >8.0 (H) 0.0 - 0.8 AI Final     Comment:     Positive  Antibody index (AI) values reflect qualitative changes in antibody   concentration that cannot be directly associated with clinical condition or   disease state.     02/19/2018 >8.0 (H) 0.0 - 0.8 AI Final     Comment:     Positive  Antibody index (AI) values reflect qualitative changes in antibody   concentration that cannot be directly associated with clinical condition or   disease state.         Toxoplasma Antibody IgG   Date Value Ref Range Status   02/19/2018 <3.0 0.0 - 7.1 IU/mL Final     Comment:     Negative- Absence of detectable Toxoplasma gondii IgG antibodies. A negative   result does not rule out acute infection.  The magnitude of the measured result is not indicative of the amount of   antibody present. The concentrations of anti-Toxoplasma gondii IgG in a given   specimen determined with assays from different manufacturers can vary due to   differences in assay methods and reagent specificity.         Imaging:  Results for orders placed or performed  in visit on 03/05/19   X-ray Chest 2 vws*    Narrative    EXAMINATION:  XR CHEST 2 VW 3/5/2019 9:17 AM.    COMPARISON: 1/16/2019.    HISTORY:  Lung transplant recipient (H). Bilateral lung  transplantation performed 3/1/2018.    FINDINGS: Upright PA and lateral radiographs of the chest.  Postsurgical changes of bilateral lung transplantation with intact  median sternotomy wires and mediastinal surgical clips. Unchanged  surgical clips projecting over the right apex. Stable cardiac  mediastinal silhouette. Unchanged trace bilateral pleural effusions.  No pneumothorax. No new focal pulmonary opacity. Moderate degenerative  changes of the thoracolumbar spine. Mild to moderate gaseous  distention of the stomach and small bowel.      Impression    IMPRESSION:   1. Stable postsurgical changes of bilateral lung transplantation.  2. Unchanged small bilateral pleural effusions.    I have personally reviewed the examination and initial interpretation  and I agree with the findings.    ADAM GONZALEZ MD           Transplant Infectious Disease Clinic Staff Note: Ms. Blue was seen, examined, and the case was discussed with Dr. Grant, ID Fellow -- I agree with her interval history and examination, assessment and plan in this outpatient Transplant ID Progress note. This note reflects my observations and opinions and the plan outlined fully reflects my approach. I have reviewed the available history, radiology, laboratory results, and reports with the Fellow.    Alysha Grant MD

## 2019-03-07 ENCOUNTER — ANESTHESIA EVENT (OUTPATIENT)
Dept: SURGERY | Facility: CLINIC | Age: 59
End: 2019-03-07
Payer: COMMERCIAL

## 2019-03-07 ENCOUNTER — ANESTHESIA (OUTPATIENT)
Dept: SURGERY | Facility: CLINIC | Age: 59
End: 2019-03-07
Payer: COMMERCIAL

## 2019-03-07 ENCOUNTER — HOSPITAL ENCOUNTER (OUTPATIENT)
Facility: CLINIC | Age: 59
Discharge: HOME | End: 2019-03-07
Attending: INTERNAL MEDICINE | Admitting: INTERNAL MEDICINE
Payer: COMMERCIAL

## 2019-03-07 VITALS
TEMPERATURE: 98.6 F | RESPIRATION RATE: 16 BRPM | OXYGEN SATURATION: 98 % | WEIGHT: 113.76 LBS | BODY MASS INDEX: 19.42 KG/M2 | HEART RATE: 79 BPM | SYSTOLIC BLOOD PRESSURE: 152 MMHG | HEIGHT: 64 IN | DIASTOLIC BLOOD PRESSURE: 88 MMHG

## 2019-03-07 DIAGNOSIS — J98.09 BRONCHIAL STENOSIS: Primary | ICD-10-CM

## 2019-03-07 LAB
COPATH REPORT: NORMAL
GLUCOSE BLDC GLUCOMTR-MCNC: 110 MG/DL (ref 70–99)
GRAM STN SPEC: ABNORMAL
KOH PREP SPEC: NORMAL
SPECIMEN SOURCE: ABNORMAL
SPECIMEN SOURCE: NORMAL

## 2019-03-07 PROCEDURE — 87081 CULTURE SCREEN ONLY: CPT | Performed by: INTERNAL MEDICINE

## 2019-03-07 PROCEDURE — 87102 FUNGUS ISOLATION CULTURE: CPT | Performed by: INTERNAL MEDICINE

## 2019-03-07 PROCEDURE — 82962 GLUCOSE BLOOD TEST: CPT

## 2019-03-07 PROCEDURE — 27210794 ZZH OR GENERAL SUPPLY STERILE: Performed by: INTERNAL MEDICINE

## 2019-03-07 PROCEDURE — 88312 SPECIAL STAINS GROUP 1: CPT | Performed by: INTERNAL MEDICINE

## 2019-03-07 PROCEDURE — 37000008 ZZH ANESTHESIA TECHNICAL FEE, 1ST 30 MIN: Performed by: INTERNAL MEDICINE

## 2019-03-07 PROCEDURE — 40000170 ZZH STATISTIC PRE-PROCEDURE ASSESSMENT II: Performed by: INTERNAL MEDICINE

## 2019-03-07 PROCEDURE — 25000128 H RX IP 250 OP 636: Performed by: NURSE ANESTHETIST, CERTIFIED REGISTERED

## 2019-03-07 PROCEDURE — 88108 CYTOPATH CONCENTRATE TECH: CPT | Performed by: INTERNAL MEDICINE

## 2019-03-07 PROCEDURE — C1726 CATH, BAL DIL, NON-VASCULAR: HCPCS | Performed by: INTERNAL MEDICINE

## 2019-03-07 PROCEDURE — 87206 SMEAR FLUORESCENT/ACID STAI: CPT | Performed by: INTERNAL MEDICINE

## 2019-03-07 PROCEDURE — 25000566 ZZH SEVOFLURANE, EA 15 MIN: Performed by: INTERNAL MEDICINE

## 2019-03-07 PROCEDURE — 71000014 ZZH RECOVERY PHASE 1 LEVEL 2 FIRST HR: Performed by: INTERNAL MEDICINE

## 2019-03-07 PROCEDURE — 25800030 ZZH RX IP 258 OP 636: Performed by: NURSE ANESTHETIST, CERTIFIED REGISTERED

## 2019-03-07 PROCEDURE — 87116 MYCOBACTERIA CULTURE: CPT | Performed by: INTERNAL MEDICINE

## 2019-03-07 PROCEDURE — 87205 SMEAR GRAM STAIN: CPT | Performed by: INTERNAL MEDICINE

## 2019-03-07 PROCEDURE — 36000057 ZZH SURGERY LEVEL 3 1ST 30 MIN - UMMC: Performed by: INTERNAL MEDICINE

## 2019-03-07 PROCEDURE — 87633 RESP VIRUS 12-25 TARGETS: CPT | Performed by: INTERNAL MEDICINE

## 2019-03-07 PROCEDURE — 25000125 ZZHC RX 250: Performed by: NURSE ANESTHETIST, CERTIFIED REGISTERED

## 2019-03-07 PROCEDURE — 37000009 ZZH ANESTHESIA TECHNICAL FEE, EACH ADDTL 15 MIN: Performed by: INTERNAL MEDICINE

## 2019-03-07 PROCEDURE — 87070 CULTURE OTHR SPECIMN AEROBIC: CPT | Performed by: INTERNAL MEDICINE

## 2019-03-07 PROCEDURE — 87210 SMEAR WET MOUNT SALINE/INK: CPT | Performed by: INTERNAL MEDICINE

## 2019-03-07 PROCEDURE — 36000059 ZZH SURGERY LEVEL 3 EA 15 ADDTL MIN UMMC: Performed by: INTERNAL MEDICINE

## 2019-03-07 PROCEDURE — 87015 SPECIMEN INFECT AGNT CONCNTJ: CPT | Performed by: INTERNAL MEDICINE

## 2019-03-07 PROCEDURE — 71000027 ZZH RECOVERY PHASE 2 EACH 15 MINS: Performed by: INTERNAL MEDICINE

## 2019-03-07 RX ORDER — FENTANYL CITRATE 50 UG/ML
INJECTION, SOLUTION INTRAMUSCULAR; INTRAVENOUS PRN
Status: DISCONTINUED | OUTPATIENT
Start: 2019-03-07 | End: 2019-03-07

## 2019-03-07 RX ORDER — SODIUM CHLORIDE, SODIUM LACTATE, POTASSIUM CHLORIDE, CALCIUM CHLORIDE 600; 310; 30; 20 MG/100ML; MG/100ML; MG/100ML; MG/100ML
INJECTION, SOLUTION INTRAVENOUS CONTINUOUS
Status: DISCONTINUED | OUTPATIENT
Start: 2019-03-07 | End: 2019-03-07 | Stop reason: HOSPADM

## 2019-03-07 RX ORDER — HYDROMORPHONE HYDROCHLORIDE 1 MG/ML
.3-.5 INJECTION, SOLUTION INTRAMUSCULAR; INTRAVENOUS; SUBCUTANEOUS EVERY 5 MIN PRN
Status: DISCONTINUED | OUTPATIENT
Start: 2019-03-07 | End: 2019-03-07 | Stop reason: HOSPADM

## 2019-03-07 RX ORDER — NALOXONE HYDROCHLORIDE 0.4 MG/ML
.1-.4 INJECTION, SOLUTION INTRAMUSCULAR; INTRAVENOUS; SUBCUTANEOUS
Status: DISCONTINUED | OUTPATIENT
Start: 2019-03-07 | End: 2019-03-07 | Stop reason: HOSPADM

## 2019-03-07 RX ORDER — LABETALOL 20 MG/4 ML (5 MG/ML) INTRAVENOUS SYRINGE
10
Status: DISCONTINUED | OUTPATIENT
Start: 2019-03-07 | End: 2019-03-07 | Stop reason: HOSPADM

## 2019-03-07 RX ORDER — LIDOCAINE 40 MG/G
CREAM TOPICAL
Status: DISCONTINUED | OUTPATIENT
Start: 2019-03-07 | End: 2019-03-07 | Stop reason: HOSPADM

## 2019-03-07 RX ORDER — LIDOCAINE HYDROCHLORIDE 10 MG/ML
INJECTION, SOLUTION INFILTRATION; PERINEURAL PRN
Status: DISCONTINUED | OUTPATIENT
Start: 2019-03-07 | End: 2019-03-07

## 2019-03-07 RX ORDER — PROPOFOL 10 MG/ML
INJECTION, EMULSION INTRAVENOUS CONTINUOUS PRN
Status: DISCONTINUED | OUTPATIENT
Start: 2019-03-07 | End: 2019-03-07

## 2019-03-07 RX ORDER — ONDANSETRON 2 MG/ML
4 INJECTION INTRAMUSCULAR; INTRAVENOUS EVERY 30 MIN PRN
Status: DISCONTINUED | OUTPATIENT
Start: 2019-03-07 | End: 2019-03-07 | Stop reason: HOSPADM

## 2019-03-07 RX ORDER — ONDANSETRON 2 MG/ML
INJECTION INTRAMUSCULAR; INTRAVENOUS PRN
Status: DISCONTINUED | OUTPATIENT
Start: 2019-03-07 | End: 2019-03-07

## 2019-03-07 RX ORDER — ONDANSETRON 4 MG/1
4 TABLET, ORALLY DISINTEGRATING ORAL EVERY 30 MIN PRN
Status: DISCONTINUED | OUTPATIENT
Start: 2019-03-07 | End: 2019-03-07 | Stop reason: HOSPADM

## 2019-03-07 RX ORDER — SODIUM CHLORIDE, SODIUM LACTATE, POTASSIUM CHLORIDE, CALCIUM CHLORIDE 600; 310; 30; 20 MG/100ML; MG/100ML; MG/100ML; MG/100ML
INJECTION, SOLUTION INTRAVENOUS CONTINUOUS PRN
Status: DISCONTINUED | OUTPATIENT
Start: 2019-03-07 | End: 2019-03-07

## 2019-03-07 RX ORDER — FENTANYL CITRATE 50 UG/ML
25-50 INJECTION, SOLUTION INTRAMUSCULAR; INTRAVENOUS
Status: DISCONTINUED | OUTPATIENT
Start: 2019-03-07 | End: 2019-03-07 | Stop reason: HOSPADM

## 2019-03-07 RX ORDER — PROPOFOL 10 MG/ML
INJECTION, EMULSION INTRAVENOUS PRN
Status: DISCONTINUED | OUTPATIENT
Start: 2019-03-07 | End: 2019-03-07

## 2019-03-07 RX ORDER — DEXAMETHASONE SODIUM PHOSPHATE 4 MG/ML
INJECTION, SOLUTION INTRA-ARTICULAR; INTRALESIONAL; INTRAMUSCULAR; INTRAVENOUS; SOFT TISSUE PRN
Status: DISCONTINUED | OUTPATIENT
Start: 2019-03-07 | End: 2019-03-07

## 2019-03-07 RX ADMIN — ROCURONIUM BROMIDE 50 MG: 10 INJECTION INTRAVENOUS at 09:57

## 2019-03-07 RX ADMIN — SODIUM CHLORIDE, POTASSIUM CHLORIDE, SODIUM LACTATE AND CALCIUM CHLORIDE: 600; 310; 30; 20 INJECTION, SOLUTION INTRAVENOUS at 09:47

## 2019-03-07 RX ADMIN — FENTANYL CITRATE 100 MCG: 50 INJECTION, SOLUTION INTRAMUSCULAR; INTRAVENOUS at 09:56

## 2019-03-07 RX ADMIN — SUGAMMADEX 200 MG: 100 INJECTION, SOLUTION INTRAVENOUS at 10:11

## 2019-03-07 RX ADMIN — MIDAZOLAM 2 MG: 1 INJECTION INTRAMUSCULAR; INTRAVENOUS at 09:47

## 2019-03-07 RX ADMIN — PROPOFOL 100 MG: 10 INJECTION, EMULSION INTRAVENOUS at 09:56

## 2019-03-07 RX ADMIN — LIDOCAINE HYDROCHLORIDE 100 MG: 10 INJECTION, SOLUTION INFILTRATION; PERINEURAL at 09:56

## 2019-03-07 RX ADMIN — PROPOFOL 150 MCG/KG/MIN: 10 INJECTION, EMULSION INTRAVENOUS at 09:57

## 2019-03-07 RX ADMIN — DEXAMETHASONE SODIUM PHOSPHATE 8 MG: 4 INJECTION, SOLUTION INTRA-ARTICULAR; INTRALESIONAL; INTRAMUSCULAR; INTRAVENOUS; SOFT TISSUE at 10:03

## 2019-03-07 RX ADMIN — ONDANSETRON 4 MG: 2 INJECTION INTRAMUSCULAR; INTRAVENOUS at 10:10

## 2019-03-07 ASSESSMENT — MIFFLIN-ST. JEOR: SCORE: 1091

## 2019-03-07 NOTE — ANESTHESIA PREPROCEDURE EVALUATION
Anesthesia Pre-Procedure Evaluation    Patient: Kecia Blue   MRN:     [id number] Gender:   female   Age:    58 year old :      1960        Preoperative Diagnosis: Post Lung Transplant   Procedure(s):  Flexible And/Or Rigid Bronchoscopy, Balloon Dilation     Past Medical History:   Diagnosis Date     Antisynthetase syndrome (H)      Chronic cough      Chronic infection     recent C diff       Dehydration 2018     Dermatomyositis (H)      Dysplasia of cervix, low grade (SETRADA 1)      ILD (interstitial lung disease) (H)      Osteopenia      PONV (postoperative nausea and vomiting)      Pulmonary hypertension (H)      Raynaud's disease      Seronegative rheumatoid arthritis (H)       Past Surgical History:   Procedure Laterality Date     BRONCHOSCOPY (RIGID OR FLEXIBLE), DIAGNOSTIC N/A 4/10/2018    Procedure: COMBINED BRONCHOSCOPY (RIGID OR FLEXIBLE), LAVAGE;;  Surgeon: Mariposa Donohue MD;  Location:  GI     BRONCHOSCOPY (RIGID OR FLEXIBLE), DILATE BRONCHUS / TRACHEA N/A 10/11/2018    Procedure: BRONCHOSCOPY (RIGID OR FLEXIBLE), DILATE BRONCHUS / TRACHEA;  Flexible And Rigid Bronchoscopy and Dilation;  Surgeon: Wilber Lin MD;  [facility]     BRONCHOSCOPY FLEXIBLE N/A 3/13/2018    Procedure: BRONCHOSCOPY FLEXIBLE;  Flexible Bronchoscopy ;  Surgeon: Gissell Sanchez MD;  [facility]     BRONCHOSCOPY FLEXIBLE N/A 2018    Procedure: BRONCHOSCOPY FLEXIBLE;;  Surgeon: Wilber Lin MD;  [facility]     BRONCHOSCOPY FLEXIBLE AND RIGID N/A 9/10/2018    Procedure: BRONCHOSCOPY FLEXIBLE AND RIGID;  Flexible and Rigid Bronchoscopy with Balloon Dilation, tissue debulking with cryo, and Right mainstem bronchus stent placement;  Surgeon: Wilber Lin MD;  [facility]     BRONCHOSCOPY RIGID N/A 2018    Procedure: BRONCHOSCOPY RIGID;;  Surgeon: Lopez Macias MD;  [facility]     BRONCHOSCOPY, DILATE BRONCHUS, STENT BRONCHUS,  COMBINED N/A 6/11/2018    Procedure: COMBINED BRONCHOSCOPY, DILATE BRONCHUS, STENT BRONCHUS;  Flexible Bronchoscopy, Balloon Dilation, Bronchial Washings;  Surgeon: Wilber Lin MD;  [facility]     BRONCHOSCOPY, DILATE BRONCHUS, STENT BRONCHUS, COMBINED Right 7/10/2018    Procedure: COMBINED BRONCHOSCOPY, DILATE BRONCHUS, STENT BRONCHUS;  Flexible Bronchoscopy, Balloon Dilation, Bronchial Washings  ;  Surgeon: Wilber Lin MD;  [facility]     BRONCHOSCOPY, DILATE BRONCHUS, STENT BRONCHUS, COMBINED N/A 8/2/2018    Procedure: COMBINED BRONCHOSCOPY, DILATE BRONCHUS, STENT BRONCHUS;  Flexible Bronchoscopy, Bronchial Washings, Balloon Dilation;  Surgeon: Wilber Lin MD;  [facility]     BRONCHOSCOPY, DILATE BRONCHUS, STENT BRONCHUS, COMBINED N/A 8/20/2018    Procedure: COMBINED BRONCHOSCOPY, DILATE BRONCHUS, STENT BRONCHUS;  Flexible Bronchoscopy, Balloon Dilation;  Surgeon: Wilber Lin MD;  [facility]     BRONCHOSCOPY, DILATE BRONCHUS, STENT BRONCHUS, COMBINED N/A 10/29/2018    Procedure: Flexible Bronchoscopy, Balloon Dilation, Stent Revision, Airway Examination And Therapeutic Suctioning, Cyro Tumor Debulking;  Surgeon: Wilber Lin MD;  [facility]     BRONCHOSCOPY, DILATE BRONCHUS, STENT BRONCHUS, COMBINED N/A 11/20/2018    Procedure: Rigid Bronchoscopy, Stent Removal and dilitation;  Surgeon: Wilber Lin MD;  [facility]     BRONCHOSCOPY, DILATE BRONCHUS, STENT BRONCHUS, COMBINED N/A 12/14/2018    Procedure: Flexible And Rigid Bronchoscopy, Balloon Dilation, Bronchial Washing;  Surgeon: Wilber Lin MD;  [facility]     BRONCHOSCOPY, DILATE BRONCHUS, STENT BRONCHUS, COMBINED N/A 1/17/2019    Procedure: Flexible And Rigid Bronchoscopy, Balloon Dilation.;  Surgeon: Wilber Lin MD;  [facility]     ENT SURGERY      tonsillectomy as a child     ESOPHAGOSCOPY, GASTROSCOPY, DUODENOSCOPY (EGD), COMBINED  N/A 10/29/2018    Procedure: COMBINED ESOPHAGOSCOPY, GASTROSCOPY, DUODENOSCOPY (EGD) with biopsies and polypectomy;  Surgeon: Chente Bloom MD;  [facility]     INSERT EXTRACORPORAL MEMBRANE OXYGENATOR ADULT (OUTSIDE OR) N/A 2/27/2018    Procedure: INSERT EXTRACORPORAL MEMBRANE OXYGENATOR ADULT (OUTSIDE OR);  INSERT EXTRACORPORAL MEMBRANE OXYGENATOR ADULT (OUTSIDE OR) ;  Surgeon: Toby Hernandez MD;  [facility]     no prior surgery       REMOVE EXTRACORPORAL MEMBRANE OXYGENATOR ADULT N/A 3/3/2018    Procedure: REMOVE EXTRACORPORAL MEMBRANE OXYGENATOR ADULT;  Removal of Right Femoral Venous and Right Axillary Arterial Extracorporeal Membrane Oxygenator;  Surgeon: Toby Hernandez MD;  [facility]     TRANSPLANT LUNG RECIPIENT SINGLE X2 Bilateral 3/1/2018    Procedure: TRANSPLANT LUNG RECIPIENT SINGLE X2;  Median Sternotomy, Extracorporeal Membrane Oxygenator, bilateral sequential lung transplant;  Surgeon: Toby Hernandez MD;  [facility]          Anesthesia Evaluation     . Pt has had prior anesthetic. Type: General    History of anesthetic complications   - PONV        ROS/MED HX    ENT/Pulmonary:     (+), . Other pulmonary disease s/p lung transplant for interstitial lung dz.    Neurologic:  - neg neurologic ROS     Cardiovascular:  - neg cardiovascular ROS   (+) ----. : . . . :. . pulmonary hypertension,       METS/Exercise Tolerance:     Hematologic:     (+) Other Hematologic Disorder-h/o dvt      Musculoskeletal:   (+) arthritis, , , other musculoskeletal- Raynaud's Dz; RA      GI/Hepatic:  - neg GI/hepatic ROS       Renal/Genitourinary:     (+) chronic renal disease, type: ARF,       Endo:     (+) Chronic steroid usage for Post Transplant Immunosuppression .      Psychiatric:  - neg psychiatric ROS       Infectious Disease:   (+) Other Infectious Disease CMV      Malignancy:      - no malignancy   Other:                         PHYSICAL EXAM:  "  Mental Status/Neuro: A/A/O   Airway: Facies: Feasible  Mallampati: II  Mouth/Opening: Full  TM distance: > 6 cm  Neck ROM: Full   Respiratory: Auscultation: CTAB     Resp. Rate: Normal     Resp. Effort: Normal      CV: Rhythm: Regular  Rate: Age appropriate  Heart: Normal Sounds   Comments:      Dental: Normal                  Lab Results   Component Value Date    WBC 3.2 (L) 03/05/2019    HGB 8.1 (L) 03/05/2019    HCT 27.3 (L) 03/05/2019     03/05/2019     03/05/2019    POTASSIUM 3.7 03/05/2019    CHLORIDE 102 03/05/2019    CO2 26 03/05/2019    BUN 21 03/05/2019    CR 0.89 03/05/2019     (H) 03/05/2019    CHELSEY 8.6 03/05/2019    PHOS 2.6 08/03/2018    MAG 1.6 03/05/2019    ALBUMIN 2.8 (L) 01/16/2019    PROTTOTAL 7.4 01/16/2019    ALT 11 01/16/2019    AST 12 01/16/2019    ALKPHOS 65 01/16/2019    BILITOTAL 0.3 01/16/2019    AMYLASE 58 02/19/2018    PTT 26 08/03/2018    INR 1.04 03/05/2019    FIBR 279 03/05/2018    TSH 1.80 08/01/2018       Preop Vitals  BP Readings from Last 3 Encounters:   03/06/19 (!) 167/94   03/05/19 (!) 169/97   01/17/19 128/85    Pulse Readings from Last 3 Encounters:   03/06/19 84   03/05/19 89   01/17/19 78      Resp Readings from Last 3 Encounters:   01/17/19 16   12/14/18 18   12/13/18 16    SpO2 Readings from Last 3 Encounters:   03/06/19 100%   03/05/19 99%   01/17/19 98%      Temp Readings from Last 1 Encounters:   03/06/19 36.7  C (98  F) (Oral)    Ht Readings from Last 1 Encounters:   03/06/19 1.626 m (5' 4\")      Wt Readings from Last 1 Encounters:   03/06/19 52.1 kg (114 lb 14.4 oz)    Estimated body mass index is 19.72 kg/m  as calculated from the following:    Height as of 3/6/19: 1.626 m (5' 4\").    Weight as of 3/6/19: 52.1 kg (114 lb 14.4 oz).     LDA:            Assessment:   ASA SCORE: 3    NPO Status: > 6 hours since completed [employer]   Documentation: H&P complete; Preop Testing complete; Consents complete   Proceeding: Proceed without further " delay  Tobacco Use:  NO Active use of Tobacco/UNKNOWN Tobacco use status     Plan:   Anes. Type:  General   Pre-Induction: Midazolam IV; Acetaminophen PO   Induction:  IV (Standard)   Airway: Oral ETT   Access/Monitoring: PIV   Maintenance: Balanced   Emergence: Procedure Site   Logistics: Same Day Surgery     Postop Pain/Sedation Strategy:  Standard-Options: Opioids PRN     PONV Management:  Adult Risk Factors: Female, H/o PONV or Motion Sickness, Non-Smoker, Postop Opioids  Prevention: Ondansetron; Dexamethasone     CONSENT: Direct conversation   Plan and risks discussed with: Patient                            Emanuel Lopez MD

## 2019-03-07 NOTE — ANESTHESIA POSTPROCEDURE EVALUATION
Anesthesia POST Procedure Evaluation    Patient: Kecia Blue   MRN:     [id number] Gender:   female   Age:    58 year old :      1960        Preoperative Diagnosis: Post Lung Transplant   Procedure(s):  Flexible and Rigid Bronchoscopy, Bronchial Washing, Balloon Dilation   Postop Comments: No value filed.       Anesthesia Type:  General    Reportable Event: NO     PAIN: Uncomplicated   Sign Out status: Comfortable, Well controlled pain     PONV: No PONV   Sign Out status:  No Nausea or Vomiting     Neuro/Psych: Uneventful perioperative course   Sign Out Status: Preoperative baseline; Age appropriate mentation     Airway/Resp.: Uneventful perioperative course   Sign Out Status: Non labored breathing, age appropriate RR; Resp. Status within EXPECTED Parameters     CV: Uneventful perioperative course   Sign Out status: Appropriate BP and perfusion indices; Appropriate HR/Rhythm     Disposition:   Sign Out in:  PACU  Disposition:  Phase II; Home  Recovery Course: Uneventful  Follow-Up: Not required           Last Anesthesia Record Vitals:  CRNA VITALS  3/7/2019 0948 - 3/7/2019 1043      3/7/2019             NIBP:  130/77    Ht Rate:  72          Last PACU/Preop Vitals:  Vitals:    19 0727   BP: (!) 156/92   Pulse: 81   Resp: 16   Temp: 36.7  C (98  F)   SpO2: 100%         Electronically Signed By: Emanuel Lopez MD, 2019, 10:43 AM

## 2019-03-07 NOTE — ANESTHESIA CARE TRANSFER NOTE
Patient: Kecia Blue    Procedure(s):  Flexible and Rigid Bronchoscopy, Bronchial Washing, Balloon Dilation    Diagnosis: Post Lung Transplant  Diagnosis Additional Information: No value filed.    Anesthesia Type:   No value filed.     Note:  Airway :Face Mask  Patient transferred to:PACU  Comments: VSS. Ventilating well.Handoff Report: Identifed the Patient, Identified the Reponsible Provider, Reviewed the pertinent medical history, Discussed the surgical course, Reviewed Intra-OP anesthesia mangement and issues during anesthesia, Set expectations for post-procedure period and Allowed opportunity for questions and acknowledgement of understanding      Vitals: (Last set prior to Anesthesia Care Transfer)    CRNA VITALS  3/7/2019 0948 - 3/7/2019 1030      3/7/2019             Pulse:  82    SpO2:  99 %                Electronically Signed By: ADRIÁN Urbina CRNA  March 7, 2019  10:30 AM

## 2019-03-07 NOTE — DISCHARGE INSTRUCTIONS
Post Bronchoscopy Patient Instructions:    March 7, 2019  Kecia Blue    Your procedure was completed without any immediate complications.    You may cough up scant amount of blood for the next 12 hours. If you have excessive cough with blood, chest pain, shortness of breath, please report to the closest emergency room.    You may experience low grade (less than 100.5 F) fever next 24 hours, if so you can take tylenol. If the fever persists more than 24 hours contact to our office or your primary care provider.    Our office (Thoracic/Pulmonary--[phone]) will call you as soon as the results of biopsies are ready.    May resume your regular diet as it was prior to procedure.    Should you have any question, please do not hesitate to call our office.    DA Lin MD

## 2019-03-07 NOTE — OP NOTE
INTERVENTIONAL PULMONOLOGY       Procedure(s):    A flexible and rigid bronchoscopy   Airway exam  Balloon bronchoplasty without stent placement (1 sites)     Indication:  Bronchial stenosis    Attending of Record:  Alana Lin MD    Interventional Pulmonary Fellow   Preet Patel MD     Trainees Present:   None    Medications:    General Anesthesia - See anesthesia flowsheet for details    Sedation Time:   Per Anesthesia Care Provider    Time Out:  Performed    The patient's medical record has been reviewed.  The indication for the procedure was reviewed.  The necessary history and physical examination was performed and reviewed.  The risks, benefits and alternatives of the procedure were discussed with the the patient in detail and she had the opportunity to ask questions.  I discussed in particular the potential complications including risks of minor or life-threatening bleeding and/or infection, respiratory failure, vocal cord trauma / paralysis, pneumothorax, and discomfort. Sedation risks were also discussed including abnormal heart rhythms, low blood pressure, and respiratory failure. All questions were answered to the best of my ability.  Verbal and written informed consent was obtained.  The proposed procedure and the patient's identification were verified prior to the procedure by the physician and the surgical team.    The patient was assessed for the adequacy for the procedure and to receive medications.   Mental Status:  Alert and oriented x 3  Airway examination:  Class II (Complete visualization of uvula)  Pulmonary:  Decreased breathsound throughout  CV:  RRR, no murmurs or gallops  ASA Grade:  (II)  Mild systemic disease    After clinical evaluation and reviewing the indication, risks, alternatives and benefits of the procedure the patient was deemed to be in satisfactory condition to undergo the procedure.           A Tuberculosis risk assessment was performed:  The patient has no known  RISK of Tuberculosis    The procedure was performed in a negative airflow room: The patient could not be moved to a negative airflow room because of needed OR for the procedure    Maneuvers / Procedure:      A Flexible and 8.5mm Rigid bronchoscope bronchoscope was used for the procedure. The rigid bronchoscope was inserted into the mouth. Uvula, epiglottis and vocal cords were seen. The scope was advanced turning the bevel to 90degress while passing through the cords and into the trachea.   Airway dilation: Airway dilation#1: The 10-12mm Elation Ballooon was used to dilate the Bronchus intermedius  airway. Each dilation was held for 60sec and repeated 3 times-Dilated up to 11 mm  Airway Examination: A complete airway examination was performed from the distal trachea to the subsegmental level in each lobe of both lungs.  Pertinent findings include patent L anastomosis.  R anastomosis with mild stenosis and malacia, scope passed initially with difficulty.  Mild stenosis BI.          Any disposable equipment was visually inspected and deemed to be intact immediately post procedure.      Relevant Pictures    Main gee      L anastomosis      R anastomosis      RUL and BI            BI        Recommendations:     -->  Repeat bronch in OR in 2 months    Preet Patel MD  IP      Krystle Salgado CNS, OCN  Clinical Nurse Specialist  Department of Thoracic Surgery  Office: [phone]  Email: [email or web address]    Chloe Avila  Interventional Pulmonology Surgery Scheduler  Office: [phone]  Email: [email or web address]    I was present with the patient, Kecia Blue, for the entire viewing portion of the bronchscopy procedure (including scope insertion and withdrawal) and agree with the interpretation and report as documented by Dr. Patel.   Alana Lin MD

## 2019-03-07 NOTE — OR NURSING
Pt has done very well.. No respiratory c/o O2 sats adequate.. Ready for discharge Detailed discharge instructions complete with contact #. Left with Transport to own car .

## 2019-03-08 LAB
ACID FAST STN SPEC QL: NORMAL
CMV DNA SPEC NAA+PROBE-ACNC: 494 [IU]/ML
CMV DNA SPEC NAA+PROBE-LOG#: 2.7 {LOG_IU}/ML
FLUAV H1 2009 PAND RNA SPEC QL NAA+PROBE: NEGATIVE
FLUAV H1 RNA SPEC QL NAA+PROBE: NEGATIVE
FLUAV H3 RNA SPEC QL NAA+PROBE: NEGATIVE
FLUAV RNA SPEC QL NAA+PROBE: NEGATIVE
FLUBV RNA SPEC QL NAA+PROBE: NEGATIVE
HADV DNA SPEC QL NAA+PROBE: NEGATIVE
HADV DNA SPEC QL NAA+PROBE: NEGATIVE
HMPV RNA SPEC QL NAA+PROBE: NEGATIVE
HPIV1 RNA SPEC QL NAA+PROBE: NEGATIVE
HPIV2 RNA SPEC QL NAA+PROBE: NEGATIVE
HPIV3 RNA SPEC QL NAA+PROBE: NEGATIVE
MICROBIOLOGIST REVIEW: ABNORMAL
RHINOVIRUS RNA SPEC QL NAA+PROBE: POSITIVE
RSV RNA SPEC QL NAA+PROBE: NEGATIVE
RSV RNA SPEC QL NAA+PROBE: NEGATIVE
SPECIMEN SOURCE: ABNORMAL
SPECIMEN SOURCE: ABNORMAL
SPECIMEN SOURCE: NORMAL

## 2019-03-08 NOTE — PROGRESS NOTES
Transplant Infectious Disease Clinic Staff Note: Ms. Blue was seen, examined, and the case was discussed with Dr. Grant, ID Fellow -- I agree with her interval history and examination, assessment and plan in this outpatient Transplant ID Progress note. This note reflects my observations and opinions and the plan outlined fully reflects my approach. I have reviewed the available history, radiology, laboratory results, and reports with the Fellow.

## 2019-03-08 NOTE — RESULT ENCOUNTER NOTE
Kecia, your bronch washings are positive for CMV.  We see this sometimes in our patients.  To correlate please get another CMV blood level early next week and weekly.  Your last blood CMV was on 2/27/19 and was slightly positive at 211 copies.  You are on Valcyte treatment dose of 900mg twice daily.  You have seen Alysha Grant and Gurdeep Patel in infectious disease and they will continue to follow you.  Call with any questions.  Magaly

## 2019-03-09 LAB
BACTERIA SPEC CULT: NORMAL
SPECIMEN SOURCE: NORMAL

## 2019-03-12 DIAGNOSIS — Z94.2 LUNG REPLACED BY TRANSPLANT (H): ICD-10-CM

## 2019-03-12 PROCEDURE — 80197 ASSAY OF TACROLIMUS: CPT | Performed by: INTERNAL MEDICINE

## 2019-03-14 ENCOUNTER — TELEPHONE (OUTPATIENT)
Dept: PULMONOLOGY | Facility: CLINIC | Age: 59
End: 2019-03-14

## 2019-03-14 LAB
TACROLIMUS BLD-MCNC: 8.9 UG/L (ref 5–15)
TME LAST DOSE: NORMAL H

## 2019-03-14 NOTE — TELEPHONE ENCOUNTER
Central Prior Authorization Team   Phone: [phone]    PA Initiation    Medication: valGANciclovir (VALCYTE) 450 MG tablet  Insurance Company: [employer] - Phone [phone] Fax [phone]  Pharmacy Filling [facility] [id number] [place] [address] [address]  Filling Pharmacy Phone: [phone]  Filling Pharmacy Fax: [phone]  Start Date: 3/14/2019

## 2019-03-14 NOTE — RESULT ENCOUNTER NOTE
Tacrolimus level 8.9 at 12 hours, on 3/12/19  Goal 8-10.   Current dose 4.5 mg in AM, 4.5 mg in PM    No dose change. At goal.     [employer] message sent

## 2019-03-14 NOTE — TELEPHONE ENCOUNTER
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

## 2019-03-15 LAB
MYCOBACTERIUM SPEC CULT: NORMAL
MYCOBACTERIUM SPEC CULT: NORMAL
SPECIMEN SOURCE: NORMAL

## 2019-03-15 NOTE — TELEPHONE ENCOUNTER
No P/A is not required. Called pharmacy and left a voicemail that if they are having problems getting rx to process through insurance to call me back directly. I then called the insurance to confirm that everything is good to go. They did confirm it is covered, and that the pharmacy did get a paid claim on 03/12/19.

## 2019-03-19 ENCOUNTER — TELEPHONE (OUTPATIENT)
Dept: PULMONOLOGY | Facility: CLINIC | Age: 59
End: 2019-03-19

## 2019-03-19 DIAGNOSIS — Z94.2 LUNG TRANSPLANT RECIPIENT (H): Primary | ICD-10-CM

## 2019-03-19 DIAGNOSIS — Z94.2 LUNG REPLACED BY TRANSPLANT (H): ICD-10-CM

## 2019-03-19 DIAGNOSIS — D70.9 NEUTROPENIA (H): ICD-10-CM

## 2019-03-19 DIAGNOSIS — D70.2 DRUG-INDUCED LEUKOPENIA (H): ICD-10-CM

## 2019-03-19 PROCEDURE — 80197 ASSAY OF TACROLIMUS: CPT | Performed by: INTERNAL MEDICINE

## 2019-03-19 NOTE — TELEPHONE ENCOUNTER
Pt left a VM 3/19/19 at 12:34pm returning a VM to her care team. Please return the pts call when available.

## 2019-03-19 NOTE — TELEPHONE ENCOUNTER
Consulted with Dr Mcelroy:    Confirm patient has stopped Imuran.    Start Neupogen 300 mcg subcutaneous daily x 3 days.  Recheck CBC in one week.    Patient notified.  LM to have her call back to confirm plan.

## 2019-03-19 NOTE — TELEPHONE ENCOUNTER
Called patient back with details about neupogen injections.  She reports starting to have autoimmune pain return in her knees now since holding the Imuran since seeing ID.    She has tylenol if needed.  Incidentally wants refill on her marinol prescription    Asked her to keep us updated on above or if develops bone pain with neupogen.

## 2019-03-19 NOTE — TELEPHONE ENCOUNTER
DATE:  3/19/2019   TIME OF RECEIPT FROM LAB:  10:42  LAB TEST:  WBC  LAB VALUE:  1.7  RESULTS GIVEN WITH READ-BACK TO (PROVIDER):  Magaly Tony  TIME LAB VALUE REPORTED TO PROVIDER:   10:46 am

## 2019-03-20 LAB
TACROLIMUS BLD-MCNC: 9.6 UG/L (ref 5–15)
TME LAST DOSE: NORMAL H

## 2019-03-20 NOTE — TELEPHONE ENCOUNTER
Called Dronabinol to the [facility] pharmacy per request of GRIFFIN lawrence pharmacist

## 2019-03-20 NOTE — RESULT ENCOUNTER NOTE
Tacrolimus level 9.6 at 12 hours, on 3/19/19  Goal 9-11.   Current dose 4.5 mg in AM, 4.5 mg in PM    No dose change. At goal.     [employer] message sent

## 2019-03-21 LAB
BACTERIA SPEC CULT: NORMAL
Lab: NORMAL
SPECIMEN SOURCE: NORMAL

## 2019-03-25 ENCOUNTER — TELEPHONE (OUTPATIENT)
Dept: TRANSPLANT | Facility: CLINIC | Age: 59
End: 2019-03-25

## 2019-03-25 NOTE — TELEPHONE ENCOUNTER
Pt would like to clarify a new medication that she is suppose to receive by injection for low white blooc cell count. Pt still has not received medication or follow up. Please connect with the pt when available

## 2019-03-25 NOTE — TELEPHONE ENCOUNTER
[facility] pharmacy and they were supposed to call patient.  She will check on it now.    Labs tomorrow note: patient has not had Neupogen injections yet so CBC may reflect this.

## 2019-03-25 NOTE — TELEPHONE ENCOUNTER
[facility] pharmacy left a VM 3/25/19 at 11:44am- Pts Rx will be transferred to [facility] pharmacy, phone number [phone]

## 2019-03-26 DIAGNOSIS — D70.2 DRUG-INDUCED LEUKOPENIA (H): ICD-10-CM

## 2019-03-26 DIAGNOSIS — D70.9 NEUTROPENIA (H): ICD-10-CM

## 2019-03-26 DIAGNOSIS — Z94.2 LUNG TRANSPLANT RECIPIENT (H): ICD-10-CM

## 2019-03-26 NOTE — PROGRESS NOTES
Sent neupogen script again to [facility] pharmacy per patient request.  deliver overnight.

## 2019-03-26 NOTE — TELEPHONE ENCOUNTER
Called Kecia to see if she had received neupogen injections.  She was notified by [facility] that it take 24 hours to fill new scripts.  She is calling them at 4pm our time today to arrange delivery.

## 2019-03-27 ENCOUNTER — TELEPHONE (OUTPATIENT)
Dept: TRANSPLANT | Facility: CLINIC | Age: 59
End: 2019-03-27

## 2019-03-27 DIAGNOSIS — D70.2 DRUG-INDUCED LEUKOPENIA (H): ICD-10-CM

## 2019-03-27 DIAGNOSIS — Z94.2 LUNG REPLACED BY TRANSPLANT (H): Primary | ICD-10-CM

## 2019-03-27 DIAGNOSIS — D70.9 NEUTROPENIA (H): ICD-10-CM

## 2019-03-27 NOTE — LETTER
2019    To:   [employer] Rx Management    RE: Kecia Blue  [address]  [place] [address]  : 1960  MRN: [id number]  Policy #: [id number]  Case/Reference: [id number]    To Whom It May Concern,    I am writing on behalf of my patient, Kecia Blue to document the medical necessity of Neupogen 300 mcg for the treatment of neutropenia and leukopenia. This letter provides information about the patient's medical history and diagnosis and a statement summarizing my treatment rationale.     Summary of Patient History and Diagnosis  Patient is status post lung transplant 3/1/18 and is having neutropenia and leukopenia. She also has a history of CMV activations.     Treatment Rationale  Since patient is post lung transplant and immunosuppressed, she is at an increased risk for infection due to the medications she is on to prevent rejection. Given that she is now having neutropenia and leukopenia, this is putting her at an even greater risk of developing an infection. Patient is on immunosuppression which should not be interrupted due to the risk of rejecting her transplanted organ. The patient also has low positive CMV viral counts and a history of worsening CMV viremia, so is on Valcyte which can also contribute to her neutropenia and leukopenia, but this medication cannot be stopped due to the risk of CMV viremia in an immunosuppressed patient who is serostatus CMV D+/R+.    Duration  Ongoing    Summary  In summary, Neupogen is medically necessary for this patient s medical condition. Please call my office at [phone] if I can provide you with any additional information to approve my request. I look forward to receiving your timely response and approval of this request.         Sincerely,    Mikayla Mcelroy MD

## 2019-03-27 NOTE — TELEPHONE ENCOUNTER
Pharmacy called- the [facility] needs a PA- they are faxing over the form   # to call for PA- [phone]

## 2019-03-27 NOTE — TELEPHONE ENCOUNTER
Urgent Prior Authorization Retail Medication Request    Medication/Dose: neupogen 300 mcg  ICD code (if different than what is on RX):  See rx  Previously Tried and Failed:  N/A  Rationale:  See chart    Insurance Name:  Preferred one  Insurance ID:  [id number]      Pharmacy Information (if different than what is on RX)  [facility]  Phone:  [phone]

## 2019-03-27 NOTE — LETTER
PHYSICIAN ORDERS      DATE & TIME ISSUED: 2019 1:09 PM  PATIENT NAME: Kecia Blue   : 1960     [facility] MR# [if applicable]: [id number]     DIAGNOSIS:  Lung Transplant  Z94.2    10/17/19 Voriconazole level, Tacrolimus level level, CMP, CBC    Any questions please call: Mikayla [phone]    Please fax these results to [phone].      .

## 2019-03-28 ENCOUNTER — TELEPHONE (OUTPATIENT)
Dept: TRANSPLANT | Facility: CLINIC | Age: 59
End: 2019-03-28

## 2019-03-28 ENCOUNTER — TELEPHONE (OUTPATIENT)
Dept: PULMONOLOGY | Facility: CLINIC | Age: 59
End: 2019-03-28

## 2019-03-28 DIAGNOSIS — Z94.2 LUNG REPLACED BY TRANSPLANT (H): ICD-10-CM

## 2019-03-28 PROCEDURE — 80197 ASSAY OF TACROLIMUS: CPT | Performed by: INTERNAL MEDICINE

## 2019-03-28 NOTE — TELEPHONE ENCOUNTER
Outside lab calls to report a WBC of 1.5. Pt was instructed to take Neupogen, but has not gotten the medication yet. Pt reports that it will come tomorrow. Requested that pt have WBC rechecked after taking Neupogen. Pt is agreeable to the plan.

## 2019-03-28 NOTE — TELEPHONE ENCOUNTER
PA Initiation    Medication: neupogen 300 mcg  Insurance Company: [employer] - Phone [phone] Fax [phone]  Pharmacy Filling the Rx:    Filling Pharmacy Phone:    Filling Pharmacy Fax:    Start Date: 3/28/2019    Submitting via manual fax to plan, I tried submitting via covermymeds and got a reject saying cannot be submitted online.  [facility]   Phone: [phone]  Fax: [phone]

## 2019-03-28 NOTE — TELEPHONE ENCOUNTER
[facility] Call Center    Phone Message    May a detailed message be left on voicemail: yes    Reason for Call: Other: [facility] calling to report alert values for white blood count ,please call back.     Action Taken: Message routed to:  [facility] pulmonology

## 2019-03-29 ENCOUNTER — TELEPHONE (OUTPATIENT)
Dept: TRANSPLANT | Facility: CLINIC | Age: 59
End: 2019-03-29

## 2019-03-29 DIAGNOSIS — Z94.2 S/P LUNG TRANSPLANT (H): ICD-10-CM

## 2019-03-29 LAB
TACROLIMUS BLD-MCNC: 19.4 UG/L (ref 5–15)
TME LAST DOSE: ABNORMAL H

## 2019-03-29 RX ORDER — TACROLIMUS 0.5 MG/1
CAPSULE ORAL
Qty: 60 CAPSULE | Refills: 3 | Status: SHIPPED | OUTPATIENT
Start: 2019-03-29 | End: 2019-04-25

## 2019-03-29 RX ORDER — TACROLIMUS 1 MG/1
3 CAPSULE ORAL 2 TIMES DAILY
Qty: 540 CAPSULE | Refills: 3 | Status: SHIPPED | OUTPATIENT
Start: 2019-03-29 | End: 2019-04-25

## 2019-03-29 NOTE — TELEPHONE ENCOUNTER
Carlotta from the insurance called with additional questions that she said if did not get answered right away would result in a denial for this request. She stated that she will change the request from urgent to a standard request so we will have an extension to call her back on Monday. She can be reached at [phone]; ARCHIE Glaser will follow up with her on Monday.

## 2019-03-29 NOTE — RESULT ENCOUNTER NOTE
Tacrolimus level 19.4 at 12 hours, on 3/28/19  Goal 9-11.   Current dose 4.5 mg in AM, 4.5 mg in PM    Dose changed to  3 mg in AM, 3 mg in PM starting tomorrow and hold tonight's dose.    Recheck level next Tuesday.    Discussed with Kecia Lin message sent

## 2019-03-29 NOTE — TELEPHONE ENCOUNTER
Faxed back addition question set to [facility] Prior Authorization Team   Phone: [phone]  Fax: [phone]

## 2019-04-01 NOTE — TELEPHONE ENCOUNTER
Faxed back add'l info requested from [facility] Prior Authorization Team   Phone: [phone]  Fax: [phone]

## 2019-04-02 DIAGNOSIS — Z20.828 CONTACT WITH OR EXPOSURE TO VIRAL DISEASE: Primary | ICD-10-CM

## 2019-04-02 NOTE — TELEPHONE ENCOUNTER
PRIOR AUTHORIZATION DENIED    Medication: neupogen 300 mcg    Denial Date: 4/2/2019    Denial Rational: pt has chronic severe neutopenia but does not have an absoute neutrophil count that is less than 500/mm^3    Appeal Information: fax [phone]        [facility] Prior Authorization Team   Phone: [phone]  Fax: [phone]

## 2019-04-03 ENCOUNTER — TELEPHONE (OUTPATIENT)
Dept: TRANSPLANT | Facility: CLINIC | Age: 59
End: 2019-04-03

## 2019-04-03 DIAGNOSIS — Z94.2 LUNG REPLACED BY TRANSPLANT (H): ICD-10-CM

## 2019-04-03 PROCEDURE — 80197 ASSAY OF TACROLIMUS: CPT | Performed by: INTERNAL MEDICINE

## 2019-04-03 NOTE — TELEPHONE ENCOUNTER
DATE:  4/3/2019   TIME OF RECEIPT FROM LAB:  111:57  LAB TEST:  WBC  LAB VALUE:  1.94  RESULTS GIVEN WITH READ-BACK TO (PROVIDER):  Text paged Magaly Tony @ 12:58. Lisa Hines @ 12:51.   TIME LAB VALUE REPORTED TO PROVIDER:   Warm Hand off to Surekha Hines @ 1:00

## 2019-04-04 LAB
BACTERIA SPEC CULT: NORMAL
FUNGUS SPEC CULT: NORMAL
SPECIMEN SOURCE: NORMAL
SPECIMEN SOURCE: NORMAL
TACROLIMUS BLD-MCNC: 10.6 UG/L (ref 5–15)
TME LAST DOSE: NORMAL H

## 2019-04-09 DIAGNOSIS — Z94.2 LUNG REPLACED BY TRANSPLANT (H): ICD-10-CM

## 2019-04-09 PROCEDURE — 80197 ASSAY OF TACROLIMUS: CPT | Performed by: INTERNAL MEDICINE

## 2019-04-10 LAB
TACROLIMUS BLD-MCNC: 8.3 UG/L (ref 5–15)
TME LAST DOSE: NORMAL H

## 2019-04-11 NOTE — RESULT ENCOUNTER NOTE
Kecia, your tacrolimus level is 8.3 at 12 hours and we will not change your tac dose as this level is near goal of 9.  We want to help your body fight the CMV viral loads.  Call me with questions.  Thanks, Magaly

## 2019-04-16 ENCOUNTER — TELEPHONE (OUTPATIENT)
Dept: TRANSPLANT | Facility: CLINIC | Age: 59
End: 2019-04-16

## 2019-04-16 PROCEDURE — 80197 ASSAY OF TACROLIMUS: CPT | Performed by: INTERNAL MEDICINE

## 2019-04-16 NOTE — TELEPHONE ENCOUNTER
Called Kecia with critical value results (WBC 1.9) and discussed still working on appeal for neupogen.  Letter of medical necessity has been on file since 4/2/19.    Asking Varsha BUI to continue to work with PA dept on this.

## 2019-04-16 NOTE — TELEPHONE ENCOUNTER
DATE:  4/16/2019   TIME OF RECEIPT FROM LAB:  11:09  LAB TEST:  WBC  LAB VALUE:  1.9  RESULTS GIVEN WITH READ-BACK TO (PROVIDER):  aMgaly Tony RN  TIME LAB VALUE REPORTED TO PROVIDER:   11:15a via [facility]

## 2019-04-17 ENCOUNTER — MEDICAL CORRESPONDENCE (OUTPATIENT)
Dept: TRANSPLANT | Facility: CLINIC | Age: 59
End: 2019-04-17

## 2019-04-17 ENCOUNTER — TELEPHONE (OUTPATIENT)
Dept: TRANSPLANT | Facility: CLINIC | Age: 59
End: 2019-04-17

## 2019-04-17 LAB
TACROLIMUS BLD-MCNC: 9.5 UG/L (ref 5–15)
TME LAST DOSE: NORMAL H

## 2019-04-17 NOTE — TELEPHONE ENCOUNTER
Medication Appeal Initiation    We have initiated an appeal for the requested medication:  Medication: NEUPOGEN 300MCG/ML INJECTION  Appeal Start Date:  4/17/2019  Insurance Company: [employer] - Phone [phone] Fax [phone]  Comments:

## 2019-04-18 NOTE — TELEPHONE ENCOUNTER
Medication Appeal Initiation    We have initiated an appeal for the requested medication:  Medication: neupogen 300 mcg (appeal initiated)  Appeal Start Date:   4/18/2019  Insurance Company:    Comments:       [facility] Prior Authorization Team   Phone: [phone]  Fax: [phone]

## 2019-04-22 NOTE — TELEPHONE ENCOUNTER
MEDICATION APPEAL DENIED    Medication: neupogen 300 mcg (appeal denied)    Denial Date:  4/18/2019    Denial Rational: patient does not have an anc that is less than 500/mm^3    Second Level Appeal Information: call [phone] or fax [phone]    Second level appeals will be managed by the clinic staff and provider. Please contact the [employer] Prior Authorization Team if additional information about the denial is needed.        [facility] Prior Authorization Team   Phone: [phone]  Fax: [phone]

## 2019-04-23 PROCEDURE — 80197 ASSAY OF TACROLIMUS: CPT | Performed by: INTERNAL MEDICINE

## 2019-04-24 LAB
TACROLIMUS BLD-MCNC: 7.3 UG/L (ref 5–15)
TME LAST DOSE: NORMAL H

## 2019-04-25 ENCOUNTER — TELEPHONE (OUTPATIENT)
Dept: TRANSPLANT | Facility: CLINIC | Age: 59
End: 2019-04-25

## 2019-04-25 DIAGNOSIS — Z94.2 S/P LUNG TRANSPLANT (H): ICD-10-CM

## 2019-04-25 RX ORDER — TACROLIMUS 0.5 MG/1
0.5 CAPSULE ORAL EVERY EVENING
Qty: 30 CAPSULE | Refills: 3 | Status: SHIPPED | OUTPATIENT
Start: 2019-04-25 | End: 2019-05-02

## 2019-04-25 RX ORDER — TACROLIMUS 1 MG/1
CAPSULE ORAL
Qty: 540 CAPSULE | Refills: 3 | Status: SHIPPED | OUTPATIENT
Start: 2019-04-25 | End: 2019-05-02

## 2019-04-25 NOTE — TELEPHONE ENCOUNTER
Urgent  Prior Authorization Retail Medication Request    Medication/Dose: Zarxio 300mcg/0.5ml  ICD code (if different than what is on RX):  Neutropenia (H) [D70.9] Drug-induced leukopenia (H) [D70.2]  Previously Tried and Failed:  Was on Neupogen but was denied previous appeal  Rationale: Suggestion of alternative medication if Zarxio denied    Cover My Meds- [employer]      Pharmacy Information (if different than what is on RX)  Name:  [phone]  Phone:  [phone]

## 2019-04-29 DIAGNOSIS — B25.9 CYTOMEGALOVIRUS (CMV) VIREMIA (H): ICD-10-CM

## 2019-04-29 DIAGNOSIS — Z94.2 LUNG TRANSPLANT RECIPIENT (H): ICD-10-CM

## 2019-04-29 DIAGNOSIS — D64.9 ANEMIA, UNSPECIFIED TYPE: ICD-10-CM

## 2019-04-29 DIAGNOSIS — J84.9 ILD (INTERSTITIAL LUNG DISEASE) (H): ICD-10-CM

## 2019-04-29 DIAGNOSIS — Z79.899 ENCOUNTER FOR LONG-TERM (CURRENT) USE OF HIGH-RISK MEDICATION: ICD-10-CM

## 2019-04-29 DIAGNOSIS — Z94.2 S/P LUNG TRANSPLANT (H): ICD-10-CM

## 2019-04-29 RX ORDER — VALGANCICLOVIR 450 MG/1
900 TABLET, FILM COATED ORAL 2 TIMES DAILY
Qty: 120 TABLET | Refills: 3 | Status: SHIPPED | OUTPATIENT
Start: 2019-04-29 | End: 2019-05-02

## 2019-04-29 NOTE — TELEPHONE ENCOUNTER
PA Initiation- via phone pa. Unable to do pa via cmm    Medication: Zarxio 300mcg/0.5ml- pa is denied   Insurance Company: [employer] - Phone [phone] Fax [phone]  Pharmacy Filling the Rx: [facility] - [facility] - [place], [place] - [address]  Filling Pharmacy Phone:    Filling Pharmacy Fax:    Start Date: 4/29/2019

## 2019-04-30 PROCEDURE — 80197 ASSAY OF TACROLIMUS: CPT | Performed by: INTERNAL MEDICINE

## 2019-05-01 LAB
TACROLIMUS BLD-MCNC: 10.2 UG/L (ref 5–15)
TME LAST DOSE: NORMAL H

## 2019-05-02 ENCOUNTER — TELEPHONE (OUTPATIENT)
Dept: TRANSPLANT | Facility: CLINIC | Age: 59
End: 2019-05-02

## 2019-05-02 DIAGNOSIS — Z94.2 S/P LUNG TRANSPLANT (H): ICD-10-CM

## 2019-05-02 DIAGNOSIS — J84.9 ILD (INTERSTITIAL LUNG DISEASE) (H): ICD-10-CM

## 2019-05-02 DIAGNOSIS — Z94.2 LUNG TRANSPLANT RECIPIENT (H): ICD-10-CM

## 2019-05-02 DIAGNOSIS — D64.9 ANEMIA, UNSPECIFIED TYPE: ICD-10-CM

## 2019-05-02 DIAGNOSIS — Z79.899 ENCOUNTER FOR LONG-TERM (CURRENT) USE OF HIGH-RISK MEDICATION: ICD-10-CM

## 2019-05-02 DIAGNOSIS — B25.9 CYTOMEGALOVIRUS (CMV) VIREMIA (H): ICD-10-CM

## 2019-05-02 DIAGNOSIS — Z94.2 LUNG REPLACED BY TRANSPLANT (H): Primary | ICD-10-CM

## 2019-05-02 RX ORDER — VALGANCICLOVIR 450 MG/1
450 TABLET, FILM COATED ORAL 2 TIMES DAILY
Qty: 60 TABLET | Refills: 6 | Status: SHIPPED | OUTPATIENT
Start: 2019-05-02 | End: 2019-07-25

## 2019-05-02 RX ORDER — TACROLIMUS 1 MG/1
CAPSULE ORAL
Qty: 540 CAPSULE | Refills: 3 | Status: SHIPPED | OUTPATIENT
Start: 2019-05-02 | End: 2019-05-09

## 2019-05-02 RX ORDER — TACROLIMUS 0.5 MG/1
0.5 CAPSULE ORAL EVERY EVENING
Qty: 30 CAPSULE | Refills: 3 | Status: SHIPPED | OUTPATIENT
Start: 2019-05-02 | End: 2019-05-09

## 2019-05-02 NOTE — TELEPHONE ENCOUNTER
----- Message from Mikayla Latham RN sent at 5/2/2019  9:54 AM CDT -----      ----- Message -----  From: Gurdeep Patel MD  Sent: 5/1/2019   8:14 PM  To: Mikayla Latham RN, #    Ms. Yeison,  I am unsure if Dr. Grant (ID Fellow, her main ID doc) responded to this message last week already.  But in case she has not, given the serial undetectable CMV PCR viral loads (< 35 international unit(s)/ml), Ms. Blue's Valcyte dose could be dropped to maintenance dosing at 450 mg (one tablet) PO BID at this point.  The lower Valcyte dose may help her ANC (which is not critical at this point).  If she continues to have negative weekly CMV PCR checks on that dose, the Valcyte could be discontinued altogether in a month (with ongoing CMV viral load monitoring thereafter).  Thanks,  Gurdeep Patel  Transplant ID staff    ----- Message -----  From: Mikayla Latham RN  Sent: 4/24/2019  12:39 PM  To: Srinivas Mcelroy MD, #    Hi,   Patient's WBC and ANCs continue to be low. We have been unable to get Neupogen due to insurance (have been trying for over a month with no luck on appeals). Do you think we should try to get her into clinic to get an injection? Any other med changes? She's still holding Imuran.  She continues on valcyte 900 mg daily. Has had two negative results. Will include her ID doctor on this too.     Thanks,   Mikayla

## 2019-05-03 LAB
MYCOBACTERIUM SPEC CULT: NORMAL
MYCOBACTERIUM SPEC CULT: NORMAL
SPECIMEN SOURCE: NORMAL

## 2019-05-07 DIAGNOSIS — Z94.2 LUNG REPLACED BY TRANSPLANT (H): ICD-10-CM

## 2019-05-07 PROCEDURE — 80197 ASSAY OF TACROLIMUS: CPT | Performed by: INTERNAL MEDICINE

## 2019-05-08 LAB
TACROLIMUS BLD-MCNC: 6.4 UG/L (ref 5–15)
TME LAST DOSE: NORMAL H

## 2019-05-09 ENCOUNTER — TELEPHONE (OUTPATIENT)
Dept: TRANSPLANT | Facility: CLINIC | Age: 59
End: 2019-05-09

## 2019-05-09 DIAGNOSIS — Z94.2 S/P LUNG TRANSPLANT (H): ICD-10-CM

## 2019-05-09 RX ORDER — TACROLIMUS 1 MG/1
CAPSULE ORAL
Qty: 540 CAPSULE | Refills: 3 | Status: SHIPPED | OUTPATIENT
Start: 2019-05-09 | End: 2019-08-08

## 2019-05-09 RX ORDER — TACROLIMUS 0.5 MG/1
0.5 CAPSULE ORAL EVERY EVENING
Qty: 30 CAPSULE | Refills: 11 | Status: SHIPPED | OUTPATIENT
Start: 2019-05-09 | End: 2019-08-08

## 2019-05-13 ENCOUNTER — TELEPHONE (OUTPATIENT)
Dept: PULMONOLOGY | Facility: CLINIC | Age: 59
End: 2019-05-13

## 2019-05-13 ENCOUNTER — MYC MEDICAL ADVICE (OUTPATIENT)
Dept: PULMONOLOGY | Facility: CLINIC | Age: 59
End: 2019-05-13

## 2019-05-13 NOTE — TELEPHONE ENCOUNTER
Spoke with patient to schedule procedure with Dr. Alana Lin    Procedure was scheduled on 06/06 at [facility] OR  Patient will have H&P with Pulmonary  Patient is aware a / is needed day of surgery.   Surgery letter was sent via [employer], patient has my direct contact information for any further questions.

## 2019-05-14 ENCOUNTER — TELEPHONE (OUTPATIENT)
Dept: TRANSPLANT | Facility: CLINIC | Age: 59
End: 2019-05-14

## 2019-05-14 DIAGNOSIS — B25.9 CMV (CYTOMEGALOVIRUS) (H): Primary | ICD-10-CM

## 2019-05-14 DIAGNOSIS — Z94.2 LUNG REPLACED BY TRANSPLANT (H): ICD-10-CM

## 2019-05-14 DIAGNOSIS — Z94.2 LUNG TRANSPLANT RECIPIENT (H): ICD-10-CM

## 2019-05-14 PROCEDURE — 80197 ASSAY OF TACROLIMUS: CPT | Performed by: INTERNAL MEDICINE

## 2019-05-14 NOTE — Clinical Note
This is a pre op visit orders and appt visit for OR 6/6/19.  Can see any of the txp pulm.Letter is fine, I will send her a pt msg.[email or web address]

## 2019-05-14 NOTE — TELEPHONE ENCOUNTER
----- Message from Lore Avila sent at 5/13/2019 12:54 PM CDT -----  Sorry- Rescheduled to 06/06    ----- Message -----  From: Lore Avila  Sent: 5/13/2019  12:08 PM  To: Zeny Tony RN    Hello,     She will be scheduled on 06/07,     #She might have left a VM with a sooner date - but I had to change it.  She is aware.     Thanks    -Lore   ----- Message -----  From: Wilber Lin MD  Sent: 3/7/2019  10:32 AM  To: ADRIÁN Zamarripa [facility], #    Hey guys,  Her airway looked good, we dilated again but looking better.  We can plan the next bronch in 2months.  Thankbari

## 2019-05-15 LAB
TACROLIMUS BLD-MCNC: 8.8 UG/L (ref 5–15)
TME LAST DOSE: NORMAL H

## 2019-05-16 NOTE — RESULT ENCOUNTER NOTE
Kecia, your tacrolimus level is 8.8 and near goal range.  No dose changes for now.  Call with questions.  Thanks Magaly

## 2019-05-21 DIAGNOSIS — Z94.2 LUNG REPLACED BY TRANSPLANT (H): ICD-10-CM

## 2019-05-21 PROCEDURE — 80197 ASSAY OF TACROLIMUS: CPT | Performed by: INTERNAL MEDICINE

## 2019-05-22 LAB
TACROLIMUS BLD-MCNC: 12.8 UG/L (ref 5–15)
TME LAST DOSE: NORMAL H

## 2019-05-23 ENCOUNTER — TELEPHONE (OUTPATIENT)
Dept: TRANSPLANT | Facility: CLINIC | Age: 59
End: 2019-05-23

## 2019-05-23 NOTE — TELEPHONE ENCOUNTER
Tacrolimus level 12.8 at 12 hours, on 5/21/19  Goal 9-11.   Current dose 3.5 mg in AM, 3.5 mg in PM    Dose changed to 3 mg in AM, 3.5 mg in PM   Recheck level in 7 days    Discussed with patient. Other labs from 5/21/19 reviewed. WBC/ANC continue to be low. CMV 59 (74). Continue weekly lab monitoring for now.

## 2019-05-28 DIAGNOSIS — Z94.2 LUNG REPLACED BY TRANSPLANT (H): ICD-10-CM

## 2019-05-28 PROCEDURE — 80197 ASSAY OF TACROLIMUS: CPT | Performed by: INTERNAL MEDICINE

## 2019-05-29 LAB
TACROLIMUS BLD-MCNC: 7.3 UG/L (ref 5–15)
TME LAST DOSE: NORMAL H

## 2019-05-30 NOTE — RESULT ENCOUNTER NOTE
Kecia, your tacrolimus level is 7.3 at 12 hours.  With the change in dose last made.  We will not change your dose and recheck a level again next week.  Call me with questions. Magaly

## 2019-06-03 NOTE — PROGRESS NOTES
[facility]  [facility]  June 5, 2019         Assessment and Plan:   Mrs. Kecia Blue is a 58 y/o female with h/o dermatomyositis, seronegative RA, ILD and pulmonary hypertension s/p bilateral lung transplant on 3/1/18. Post operative course complicated by right main bronchial stenosis, positive DSAs, CMV viremia, C difficile and CMV viremia. She is seen today for routine follow up.      Coordinator/MD: AMANDA/JONO     1. H/o CMV viremia: last serum CMV negative on 5/28/19. Serum values have been negative since February. Has not followed up with ID, last seen in March.   - CMV added on to today  - Continue valcyte 450 BID  - Will message Dr. Patel after today's results for plan/need for follow up     2. Leukopenia: with WBC of 3.2 today. Improved from prior.  - Imuran remains on hold      3. S/p bilateral lung transplant: feeling well with improved exercise endurance (although activity limited by knee pain). Does have productive cough of whitish mucous in the am. Sating 99% on room air. DSA 3/5/19 negative. CXR reviewed by me today demonstrates stable transplant with small left effusion. PFTs improved 13% today from prior. Scheduled for bronch in OR tomorrow.   - Continue tacrolimus (goal 8-10) and prednisone  - Bactrim prophylaxis   - Add on 12 month high risk labs     4. Right main bronchial stenosis s/p dilatation: most recently on 3/7/19. No stent currently in place. Overall feeling well with improved PFTs.   - Continue pentoxyfilline  - Cleared for the OR tomorrow     5. CKD: Cr stable today at 1.15.   - Avoid nephrotoxins  - Continue hydration     6. HTN: BP in clinic 176/90, patient notes it's been elevated at home ranging from 140-150s/80-90s.  - Increase metoprolol to 50 mg BID     7. Bilateral knee pain: with bilateral effusions, L>R, feels the pain is progressive over the last months. No warmth, erythema or tenderness. Mobility is limited secondary to knee  pain; did have a steroid injection in her left knee last July.  - WIll f/u with [facility] in [place]  - Tylenol 1000 mg PRN every 8 hours, recommend in the am to improve her pain/mobility during the day    Chronic issus:  1. Dermatomyositis     RTC: pending bronch results  Influenza and other vaccinations: gave second dose of Shingrix today  Annual dermatology visit: ???     Ame Chow PA-C  Pulmonary, Allergy, Critical Care and Sleep Medicine        Interval History:   Feeling well, only this that bothers her are her knees. Off the Imuran and has noticed her BP has gone up. Breathing is fine, shortness of breath quick movement otherwise very stable. Cough is present in am, productive every 3rd day, white/clear. No chest pain or palpitations. No GI complaints, stools are good, 1 bm every day.           Review of Systems:   Please see HPI, otherwise the complete 10 point ROS is negative.           Past Medical and Surgical History:     Past Medical History:   Diagnosis Date     Antisynthetase syndrome (H) 2014     Chronic cough      Chronic infection     recent C diff  8/18     Dehydration 8/1/2018     Dermatomyositis (H)      Dysplasia of cervix, low grade (ESTRADA 1)      ILD (interstitial lung disease) (H)      Osteopenia      PONV (postoperative nausea and vomiting)      Pulmonary hypertension (H)      Raynaud's disease      Seronegative rheumatoid arthritis (H)      Past Surgical History:   Procedure Laterality Date     BRONCHOSCOPY (RIGID OR FLEXIBLE), DIAGNOSTIC N/A 4/10/2018    Procedure: COMBINED BRONCHOSCOPY (RIGID OR FLEXIBLE), LAVAGE;;  Surgeon: Mariposa Donohue MD;  Location: [facility]     BRONCHOSCOPY (RIGID OR FLEXIBLE), DILATE BRONCHUS / TRACHEA N/A 10/11/2018    Procedure: BRONCHOSCOPY (RIGID OR FLEXIBLE), DILATE BRONCHUS / TRACHEA;  Flexible And Rigid Bronchoscopy and Dilation;  Surgeon: Wilber Lin MD;  Location: [facility]     BRONCHOSCOPY FLEXIBLE N/A 3/13/2018    Procedure:  BRONCHOSCOPY FLEXIBLE;  Flexible Bronchoscopy ;  Surgeon: Gissell Sanchez MD;  Location: [facility]     BRONCHOSCOPY FLEXIBLE N/A 5/9/2018    Procedure: BRONCHOSCOPY FLEXIBLE;;  Surgeon: Wilber Lin MD;  Location: [facility]     BRONCHOSCOPY FLEXIBLE AND RIGID N/A 9/10/2018    Procedure: BRONCHOSCOPY FLEXIBLE AND RIGID;  Flexible and Rigid Bronchoscopy with Balloon Dilation, tissue debulking with cryo, and Right mainstem bronchus stent placement;  Surgeon: Wilber Lin MD;  Location: [facility]     BRONCHOSCOPY RIGID N/A 6/6/2018    Procedure: BRONCHOSCOPY RIGID;;  Surgeon: Lopez Macias MD;  Location: [facility]     BRONCHOSCOPY, DILATE BRONCHUS, STENT BRONCHUS, COMBINED N/A 6/11/2018    Procedure: COMBINED BRONCHOSCOPY, DILATE BRONCHUS, STENT BRONCHUS;  Flexible Bronchoscopy, Balloon Dilation, Bronchial Washings;  Surgeon: Wilber Lin MD;  Location: [facility]     BRONCHOSCOPY, DILATE BRONCHUS, STENT BRONCHUS, COMBINED Right 7/10/2018    Procedure: COMBINED BRONCHOSCOPY, DILATE BRONCHUS, STENT BRONCHUS;  Flexible Bronchoscopy, Balloon Dilation, Bronchial Washings  ;  Surgeon: Wilber Lin MD;  Location: [facility]     BRONCHOSCOPY, DILATE BRONCHUS, STENT BRONCHUS, COMBINED N/A 8/2/2018    Procedure: COMBINED BRONCHOSCOPY, DILATE BRONCHUS, STENT BRONCHUS;  Flexible Bronchoscopy, Bronchial Washings, Balloon Dilation;  Surgeon: Wilber Lin MD;  Location: [facility]     BRONCHOSCOPY, DILATE BRONCHUS, STENT BRONCHUS, COMBINED N/A 8/20/2018    Procedure: COMBINED BRONCHOSCOPY, DILATE BRONCHUS, STENT BRONCHUS;  Flexible Bronchoscopy, Balloon Dilation;  Surgeon: Wilber Lin MD;  [facility]: [facility]     BRONCHOSCOPY, DILATE BRONCHUS, STENT BRONCHUS, COMBINED N/A 10/29/2018    Procedure: Flexible Bronchoscopy, Balloon Dilation, Stent Revision, Airway Examination And Therapeutic Suctioning, Cyro Tumor Debulking;  Surgeon: Wilber Lin MD;  Location: [facility]     BRONCHOSCOPY,  DILATE BRONCHUS, STENT BRONCHUS, COMBINED N/A 11/20/2018    Procedure: Rigid Bronchoscopy, Stent Removal and dilitation;  Surgeon: Wilber Lin MD;  Location: [facility]     BRONCHOSCOPY, DILATE BRONCHUS, STENT BRONCHUS, COMBINED N/A 12/14/2018    Procedure: Flexible And Rigid Bronchoscopy, Balloon Dilation, Bronchial Washing;  Surgeon: Wilber Lin MD;  Location: [facility]     BRONCHOSCOPY, DILATE BRONCHUS, STENT BRONCHUS, COMBINED N/A 1/17/2019    Procedure: Flexible And Rigid Bronchoscopy, Balloon Dilation.;  Surgeon: Wilber Lin MD;  Location: [facility]     BRONCHOSCOPY, DILATE BRONCHUS, STENT BRONCHUS, COMBINED N/A 3/7/2019    Procedure: Flexible and Rigid Bronchoscopy, Bronchial Washing, Balloon Dilation;  Surgeon: Wilber Lin MD;  Location: [facility]     ENT SURGERY      tonsillectomy as a child     ESOPHAGOSCOPY, GASTROSCOPY, DUODENOSCOPY (EGD), COMBINED N/A 10/29/2018    Procedure: COMBINED ESOPHAGOSCOPY, GASTROSCOPY, DUODENOSCOPY (EGD) with biopsies and polypectomy;  Surgeon: Chente Bloom MD;  Location[facility]     INSERT EXTRACORPORAL MEMBRANE OXYGENATOR ADULT (OUTSIDE OR) N/A 2/27/2018    Procedure: INSERT EXTRACORPORAL MEMBRANE OXYGENATOR ADULT (OUTSIDE OR);  INSERT EXTRACORPORAL MEMBRANE OXYGENATOR ADULT (OUTSIDE OR) ;  Surgeon: Toby Hernandez MD;  Location: [facility]     no prior surgery       REMOVE EXTRACORPORAL MEMBRANE OXYGENATOR ADULT N/A 3/3/2018    Procedure: REMOVE EXTRACORPORAL MEMBRANE OXYGENATOR ADULT;  Removal of Right Femoral Venous and Right Axillary Arterial Extracorporeal Membrane Oxygenator;  Surgeon: Toby Hernandez MD;  Location: [facility]     TRANSPLANT LUNG RECIPIENT SINGLE X2 Bilateral 3/1/2018    Procedure: TRANSPLANT LUNG RECIPIENT SINGLE X2;  Median Sternotomy, Extracorporeal Membrane Oxygenator, bilateral sequential lung transplant;  Surgeon: Toby Hernandez MD;  Location: [facility]           Family History:     Family  History   Problem Relation Age of Onset     Hypertension Mother      Arthritis Mother      Pancreatic Cancer Father      Diabetes Father             Social History:     Social History     Socioeconomic History     Marital status:      Spouse name: Not on file     Number of children: Not on file     Years of education: Not on file     Highest education level: Not on file   Occupational History     Not on file   Social Needs     Financial resource strain: Not on file     Food insecurity:     Worry: Not on file     Inability: Not on file     Transportation needs:     Medical: Not on file     Non-medical: Not on file   Tobacco Use     Smoking status: Never Smoker     Smokeless tobacco: Never Used   Substance and Sexual Activity     Alcohol use: No     Alcohol/week: 0.6 oz     Types: 1 Glasses of wine per week     Drug use: No     Sexual activity: Not on file   Lifestyle     Physical activity:     Days per week: Not on file     Minutes per session: Not on file     Stress: Not on file   Relationships     Social connections:     Talks on phone: Not on file     Gets together: Not on file     Attends Taoist service: Not on file     Active member of club or organization: Not on file     Attends meetings of clubs or organizations: Not on file     Relationship status: Not on file     Intimate partner violence:     Fear of current or ex partner: Not on file     Emotionally abused: Not on file     Physically abused: Not on file     Forced sexual activity: Not on file   Other Topics Concern     Parent/sibling w/ CABG, MI or angioplasty before 65F 55M? No   Social History Narrative    3/6/2019 - Lives with . Has three daughters. Four grandchildren (two ). No pets. Travelled previously to [facility]. Has visited [place] several times.             Medications:     Current Outpatient Medications   Medication     ACETAMINOPHEN PO     calcium-vitamin D (CALTRATE) 600-400 MG-UNIT per tablet     ferrous  "sulfate (FEROSUL) 325 (65 Fe) MG tablet     magnesium oxide (MAG-OX) 400 MG tablet     metoprolol tartrate (LOPRESSOR) 25 MG tablet     multivitamin, therapeutic with minerals (THERA-VIT-M) TABS tablet     ondansetron (ZOFRAN) 4 MG tablet     order for DME     order for DME     pantoprazole (PROTONIX) 40 MG EC tablet     pentoxifylline (TRENTAL) 400 MG CR tablet     predniSONE (DELTASONE) 2.5 MG tablet     sulfamethoxazole-trimethoprim (BACTRIM/SEPTRA) 400-80 MG tablet     tacrolimus (GENERIC EQUIVALENT) 0.5 MG capsule     tacrolimus (GENERIC EQUIVALENT) 1 MG capsule     valGANciclovir (VALCYTE) 450 MG tablet     Current Facility-Administered Medications   Medication     zoster vaccine recombinant adjuvanted (SHINGRIX) injection 0.5 mL            Physical Exam:   /90   Pulse 84   Ht 1.613 m (5' 3.5\")   Wt 54.4 kg (119 lb 14.4 oz)   LMP 06/07/2014 (Exact Date)   SpO2 99%   BMI 20.91 kg/m      GENERAL: alert, NAD  HEENT: NCAT, EOMI, no scleral icterus, oral mucosa moist and without lesions  Neck: no cervical or supraclavicular adenopathy  Lungs: good air flow, no crackles, rhonchi or wheezing  CV: RRR, S1S2, no murmurs noted  Abdomen: normoactive BS, soft, non tender   Lymph: no edema  Neuro: AAO X 3, CN 2-12 grossly intact  Psychiatric: normal affect, good eye contact  Skin: no rash, jaundice or lesions on limited exam         Data:   All laboratory and imaging data reviewed.      Recent Results (from the past 168 hour(s))   INR    Collection Time: 06/05/19  9:22 AM   Result Value Ref Range    INR 1.01 0.86 - 1.14   Magnesium    Collection Time: 06/05/19  9:22 AM   Result Value Ref Range    Magnesium 2.0 1.6 - 2.3 mg/dL   Basic metabolic panel    Collection Time: 06/05/19  9:22 AM   Result Value Ref Range    Sodium 136 133 - 144 mmol/L    Potassium 4.1 3.4 - 5.3 mmol/L    Chloride 101 94 - 109 mmol/L    Carbon Dioxide 28 20 - 32 mmol/L    Anion Gap 7 3 - 14 mmol/L    Glucose 92 70 - 99 mg/dL    Urea " Nitrogen 30 7 - 30 mg/dL    Creatinine 1.15 (H) 0.52 - 1.04 mg/dL    GFR Estimate 53 (L) >60 mL/min/[1.73_m2]    GFR Estimate If Black 61 >60 mL/min/[1.73_m2]    Calcium 9.9 8.5 - 10.1 mg/dL   CBC with platelets    Collection Time: 06/05/19  9:22 AM   Result Value Ref Range    WBC 3.2 (L) 4.0 - 11.0 10e9/L    RBC Count 3.16 (L) 3.8 - 5.2 10e12/L    Hemoglobin 9.9 (L) 11.7 - 15.7 g/dL    Hematocrit 32.8 (L) 35.0 - 47.0 %     (H) 78 - 100 fl    MCH 31.3 26.5 - 33.0 pg    MCHC 30.2 (L) 31.5 - 36.5 g/dL    RDW 15.1 (H) 10.0 - 15.0 %    Platelet Count 278 150 - 450 10e9/L   General PFT Lab (Please always keep checked)    Collection Time: 06/05/19 10:10 AM   Result Value Ref Range    FVC-Pred 3.23 L    FVC-Pre 2.26 L    FVC-%Pred-Pre 70 %    FEV1-Pre 1.65 L    FEV1-%Pred-Pre 64 %    FEV1FVC-Pred 80 %    FEV1FVC-Pre 73 %    FEFMax-Pred 6.42 L/sec    FEFMax-Pre 3.66 L/sec    FEFMax-%Pred-Pre 57 %    FEF2575-Pred 2.43 L/sec    FEF2575-Pre 1.17 L/sec    BMS7986-%Pred-Pre 48 %    ExpTime-Pre 7.42 sec    FIFMax-Pre 3.18 L/sec    VC-Pred 3.29 L    VC-Pre 1.93 L    VC-%Pred-Pre 58 %    IC-Pred 2.24 L    IC-Pre 1.14 L    IC-%Pred-Pre 50 %    ERV-Pred 1.06 L    ERV-Pre 0.79 L    ERV-%Pred-Pre 75 %    FEV1FEV6-Pred 81 %    FEV1FEV6-Pre 73 %    DLCOunc-Pred 20.20 ml/min/mmHg    DLCOunc-Pre 11.22 ml/min/mmHg    DLCOunc-%Pred-Pre 55 %    DLCOcor-Pre 12.85 ml/min/mmHg    DLCOcor-%Pred-Pre 63 %    VA-Pre 2.67 L    VA-%Pred-Pre 56 %    FEV1SVC-Pred 78 %    FEV1SVC-Pre 85 %     PFT interpretation:  Maneuver: valid and meets ATS guidelines  Normal ratio with decreased FEV1 and FVC  Compared to prior: FEV1 of 1.65 is 340 ml above prior  The decrease in FVC may represent restrictive physiology.  Lung volumes would be necessary to determine.

## 2019-06-05 ENCOUNTER — OFFICE VISIT (OUTPATIENT)
Dept: PULMONOLOGY | Facility: CLINIC | Age: 59
End: 2019-06-05
Attending: PHYSICIAN ASSISTANT
Payer: COMMERCIAL

## 2019-06-05 ENCOUNTER — ANESTHESIA EVENT (OUTPATIENT)
Dept: SURGERY | Facility: CLINIC | Age: 59
End: 2019-06-05
Payer: COMMERCIAL

## 2019-06-05 ENCOUNTER — RESULTS ONLY (OUTPATIENT)
Dept: OTHER | Facility: CLINIC | Age: 59
End: 2019-06-05

## 2019-06-05 ENCOUNTER — ANCILLARY PROCEDURE (OUTPATIENT)
Dept: GENERAL RADIOLOGY | Facility: CLINIC | Age: 59
End: 2019-06-05
Attending: INTERNAL MEDICINE
Payer: COMMERCIAL

## 2019-06-05 ENCOUNTER — APPOINTMENT (OUTPATIENT)
Dept: LAB | Facility: CLINIC | Age: 59
End: 2019-06-05
Payer: COMMERCIAL

## 2019-06-05 VITALS
WEIGHT: 119.9 LBS | HEIGHT: 64 IN | BODY MASS INDEX: 20.47 KG/M2 | DIASTOLIC BLOOD PRESSURE: 90 MMHG | HEART RATE: 84 BPM | SYSTOLIC BLOOD PRESSURE: 176 MMHG | OXYGEN SATURATION: 99 %

## 2019-06-05 DIAGNOSIS — B25.9 CMV (CYTOMEGALOVIRUS) (H): ICD-10-CM

## 2019-06-05 DIAGNOSIS — R11.2 INTRACTABLE VOMITING WITH NAUSEA, UNSPECIFIED VOMITING TYPE: ICD-10-CM

## 2019-06-05 DIAGNOSIS — Z94.2 LUNG REPLACED BY TRANSPLANT (H): Primary | ICD-10-CM

## 2019-06-05 DIAGNOSIS — Z94.2 LUNG TRANSPLANT RECIPIENT (H): ICD-10-CM

## 2019-06-05 DIAGNOSIS — I48.0 PAROXYSMAL ATRIAL FIBRILLATION (H): ICD-10-CM

## 2019-06-05 LAB
ANION GAP SERPL CALCULATED.3IONS-SCNC: 7 MMOL/L (ref 3–14)
BUN SERPL-MCNC: 30 MG/DL (ref 7–30)
CALCIUM SERPL-MCNC: 9.9 MG/DL (ref 8.5–10.1)
CHLORIDE SERPL-SCNC: 101 MMOL/L (ref 94–109)
CO2 SERPL-SCNC: 28 MMOL/L (ref 20–32)
CREAT SERPL-MCNC: 1.15 MG/DL (ref 0.52–1.04)
ERYTHROCYTE [DISTWIDTH] IN BLOOD BY AUTOMATED COUNT: 15.1 % (ref 10–15)
GFR SERPL CREATININE-BSD FRML MDRD: 53 ML/MIN/{1.73_M2}
GLUCOSE SERPL-MCNC: 92 MG/DL (ref 70–99)
HBV CORE AB SERPL QL IA: NONREACTIVE
HBV SURFACE AG SERPL QL IA: NONREACTIVE
HCT VFR BLD AUTO: 32.8 % (ref 35–47)
HCV AB SERPL QL IA: NONREACTIVE
HGB BLD-MCNC: 9.9 G/DL (ref 11.7–15.7)
HIV 1+2 AB+HIV1 P24 AG SERPL QL IA: NONREACTIVE
INR PPP: 1.01 (ref 0.86–1.14)
MAGNESIUM SERPL-MCNC: 2 MG/DL (ref 1.6–2.3)
MCH RBC QN AUTO: 31.3 PG (ref 26.5–33)
MCHC RBC AUTO-ENTMCNC: 30.2 G/DL (ref 31.5–36.5)
MCV RBC AUTO: 104 FL (ref 78–100)
PLATELET # BLD AUTO: 278 10E9/L (ref 150–450)
POTASSIUM SERPL-SCNC: 4.1 MMOL/L (ref 3.4–5.3)
RBC # BLD AUTO: 3.16 10E12/L (ref 3.8–5.2)
SODIUM SERPL-SCNC: 136 MMOL/L (ref 133–144)
TACROLIMUS BLD-MCNC: 8.4 UG/L (ref 5–15)
TME LAST DOSE: NORMAL H
WBC # BLD AUTO: 3.2 10E9/L (ref 4–11)

## 2019-06-05 PROCEDURE — 86832 HLA CLASS I HIGH DEFIN QUAL: CPT | Performed by: INTERNAL MEDICINE

## 2019-06-05 PROCEDURE — 25000581 ZZH RX MED A9270 GY (STAT IND- M) 250: Mod: ZF | Performed by: PHYSICIAN ASSISTANT

## 2019-06-05 PROCEDURE — 86833 HLA CLASS II HIGH DEFIN QUAL: CPT | Performed by: INTERNAL MEDICINE

## 2019-06-05 PROCEDURE — G0463 HOSPITAL OUTPT CLINIC VISIT: HCPCS | Mod: 25,ZF

## 2019-06-05 PROCEDURE — 86704 HEP B CORE ANTIBODY TOTAL: CPT | Performed by: PHYSICIAN ASSISTANT

## 2019-06-05 PROCEDURE — 80048 BASIC METABOLIC PNL TOTAL CA: CPT | Performed by: INTERNAL MEDICINE

## 2019-06-05 PROCEDURE — 87340 HEPATITIS B SURFACE AG IA: CPT | Performed by: PHYSICIAN ASSISTANT

## 2019-06-05 PROCEDURE — 90750 HZV VACC RECOMBINANT IM: CPT | Mod: ZF | Performed by: PHYSICIAN ASSISTANT

## 2019-06-05 PROCEDURE — 80197 ASSAY OF TACROLIMUS: CPT | Performed by: INTERNAL MEDICINE

## 2019-06-05 PROCEDURE — 83735 ASSAY OF MAGNESIUM: CPT | Performed by: INTERNAL MEDICINE

## 2019-06-05 PROCEDURE — 87389 HIV-1 AG W/HIV-1&-2 AB AG IA: CPT | Performed by: PHYSICIAN ASSISTANT

## 2019-06-05 PROCEDURE — 85027 COMPLETE CBC AUTOMATED: CPT | Performed by: INTERNAL MEDICINE

## 2019-06-05 PROCEDURE — 36415 COLL VENOUS BLD VENIPUNCTURE: CPT | Performed by: INTERNAL MEDICINE

## 2019-06-05 PROCEDURE — 86803 HEPATITIS C AB TEST: CPT | Performed by: PHYSICIAN ASSISTANT

## 2019-06-05 PROCEDURE — 85610 PROTHROMBIN TIME: CPT | Performed by: INTERNAL MEDICINE

## 2019-06-05 PROCEDURE — 36415 COLL VENOUS BLD VENIPUNCTURE: CPT | Performed by: PHYSICIAN ASSISTANT

## 2019-06-05 PROCEDURE — 90471 IMMUNIZATION ADMIN: CPT | Mod: ZF

## 2019-06-05 RX ORDER — ONDANSETRON 4 MG/1
4 TABLET, FILM COATED ORAL EVERY 12 HOURS PRN
Qty: 60 TABLET | Refills: 0 | Status: SHIPPED | OUTPATIENT
Start: 2019-06-05 | End: 2019-11-11

## 2019-06-05 RX ORDER — METOPROLOL TARTRATE 25 MG/1
50 TABLET, FILM COATED ORAL 2 TIMES DAILY
Qty: 60 TABLET | Refills: 11 | COMMUNITY
Start: 2019-06-05 | End: 2019-07-08

## 2019-06-05 RX ADMIN — ZOSTER VACCINE RECOMBINANT, ADJUVANTED 0.5 ML: KIT at 11:39

## 2019-06-05 ASSESSMENT — ENCOUNTER SYMPTOMS
MUSCLE CRAMPS: 0
BACK PAIN: 0
MUSCLE WEAKNESS: 1
NECK PAIN: 0
STIFFNESS: 1
MYALGIAS: 1
ARTHRALGIAS: 1
JOINT SWELLING: 1

## 2019-06-05 ASSESSMENT — MIFFLIN-ST. JEOR: SCORE: 1110.92

## 2019-06-05 ASSESSMENT — PAIN SCALES - GENERAL: PAINLEVEL: NO PAIN (0)

## 2019-06-05 NOTE — ANESTHESIA PREPROCEDURE EVALUATION
Anesthesia Pre-Procedure Evaluation    Patient: Kecia Blue   MRN:     [id number] Gender:   female   Age:    58 year old :      1960        Preoperative Diagnosis: Bronchial Stenosis   Procedure(s):  Flexible Bronchoscopy, Balloon Dilation     Past Medical History:   Diagnosis Date     Antisynthetase syndrome (H)      Chronic cough      Chronic infection     recent C diff       Dehydration 2018     Dermatomyositis (H)      Dysplasia of cervix, low grade (ESTRADA 1)      ILD (interstitial lung disease) (H)      Osteopenia      PONV (postoperative nausea and vomiting)      Pulmonary hypertension (H)      Raynaud's disease      Seronegative rheumatoid arthritis (H)       Past Surgical History:   Procedure Laterality Date     BRONCHOSCOPY (RIGID OR FLEXIBLE), DIAGNOSTIC N/A 4/10/2018    Procedure: COMBINED BRONCHOSCOPY (RIGID OR FLEXIBLE), LAVAGE;;  Surgeon: Mariposa Donohue MD;  Location: [facility]     BRONCHOSCOPY (RIGID OR FLEXIBLE), DILATE BRONCHUS / TRACHEA N/A 10/11/2018    Procedure: BRONCHOSCOPY (RIGID OR FLEXIBLE), DILATE BRONCHUS / TRACHEA;  Flexible And Rigid Bronchoscopy and Dilation;  Surgeon: Wilber Lin MD;  Location: [facility]     BRONCHOSCOPY FLEXIBLE N/A 3/13/2018    Procedure: BRONCHOSCOPY FLEXIBLE;  Flexible Bronchoscopy ;  Surgeon: Gissell Sanchez MD;  Location: [facility]     BRONCHOSCOPY FLEXIBLE N/A 2018    Procedure: BRONCHOSCOPY FLEXIBLE;;  Surgeon: Wilber Lin MD;  Location: [facility]     BRONCHOSCOPY FLEXIBLE AND RIGID N/A 9/10/2018    Procedure: BRONCHOSCOPY FLEXIBLE AND RIGID;  Flexible and Rigid Bronchoscopy with Balloon Dilation, tissue debulking with cryo, and Right mainstem bronchus stent placement;  Surgeon: Wilber Lin MD;  [facility]     BRONCHOSCOPY RIGID N/A 2018    Procedure: BRONCHOSCOPY RIGID;;  Surgeon: Lopez Macias MD;  Location: [facility]     BRONCHOSCOPY, DILATE BRONCHUS, STENT BRONCHUS, COMBINED N/A  6/11/2018    Procedure: COMBINED BRONCHOSCOPY, DILATE BRONCHUS, STENT BRONCHUS;  Flexible Bronchoscopy, Balloon Dilation, Bronchial Washings;  Surgeon: Wilber Lin MD;  [facility]     BRONCHOSCOPY, DILATE BRONCHUS, STENT BRONCHUS, COMBINED Right 7/10/2018    Procedure: COMBINED BRONCHOSCOPY, DILATE BRONCHUS, STENT BRONCHUS;  Flexible Bronchoscopy, Balloon Dilation, Bronchial Washings  ;  Surgeon: Wilber Lin MD;  [facility]     BRONCHOSCOPY, DILATE BRONCHUS, STENT BRONCHUS, COMBINED N/A 8/2/2018    Procedure: COMBINED BRONCHOSCOPY, DILATE BRONCHUS, STENT BRONCHUS;  Flexible Bronchoscopy, Bronchial Washings, Balloon Dilation;  Surgeon: Wilber Lin MD;  [facility]     BRONCHOSCOPY, DILATE BRONCHUS, STENT BRONCHUS, COMBINED N/A 8/20/2018    Procedure: COMBINED BRONCHOSCOPY, DILATE BRONCHUS, STENT BRONCHUS;  Flexible Bronchoscopy, Balloon Dilation;  Surgeon: Wilber Lin MD;  [facility]     BRONCHOSCOPY, DILATE BRONCHUS, STENT BRONCHUS, COMBINED N/A 10/29/2018    Procedure: Flexible Bronchoscopy, Balloon Dilation, Stent Revision, Airway Examination And Therapeutic Suctioning, Cyro Tumor Debulking;  Surgeon: Wilber Lin MD;  [facility]     BRONCHOSCOPY, DILATE BRONCHUS, STENT BRONCHUS, COMBINED N/A 11/20/2018    Procedure: Rigid Bronchoscopy, Stent Removal and dilitation;  Surgeon: Wilber Lin MD;  [facility]     BRONCHOSCOPY, DILATE BRONCHUS, STENT BRONCHUS, COMBINED N/A 12/14/2018    Procedure: Flexible And Rigid Bronchoscopy, Balloon Dilation, Bronchial Washing;  Surgeon: Wilber Lin MD;  [facility]     BRONCHOSCOPY, DILATE BRONCHUS, STENT BRONCHUS, COMBINED N/A 1/17/2019    Procedure: Flexible And Rigid Bronchoscopy, Balloon Dilation.;  Surgeon: Wilber Lin MD;  [facility]     BRONCHOSCOPY, DILATE BRONCHUS, STENT BRONCHUS, COMBINED N/A 3/7/2019    Procedure: Flexible and Rigid Bronchoscopy,  Bronchial Washing, Balloon Dilation;  Surgeon: Wilber Lin MD;  Location: [facility]     ENT SURGERY      tonsillectomy as a child     ESOPHAGOSCOPY, GASTROSCOPY, DUODENOSCOPY (EGD), COMBINED N/A 10/29/2018    Procedure: COMBINED ESOPHAGOSCOPY, GASTROSCOPY, DUODENOSCOPY (EGD) with biopsies and polypectomy;  Surgeon: Chente Bloom MD;  [facility]     INSERT EXTRACORPORAL MEMBRANE OXYGENATOR ADULT (OUTSIDE OR) N/A 2/27/2018    Procedure: INSERT EXTRACORPORAL MEMBRANE OXYGENATOR ADULT (OUTSIDE OR);  INSERT EXTRACORPORAL MEMBRANE OXYGENATOR ADULT (OUTSIDE OR) ;  Surgeon: Toby Hernandez MD;  Location: [facility]     no prior surgery       REMOVE EXTRACORPORAL MEMBRANE OXYGENATOR ADULT N/A 3/3/2018    Procedure: REMOVE EXTRACORPORAL MEMBRANE OXYGENATOR ADULT;  Removal of Right Femoral Venous and Right Axillary Arterial Extracorporeal Membrane Oxygenator;  Surgeon: Toby Hernandez MD;  Location: [facility]     TRANSPLANT LUNG RECIPIENT SINGLE X2 Bilateral 3/1/2018    Procedure: TRANSPLANT LUNG RECIPIENT SINGLE X2;  Median Sternotomy, Extracorporeal Membrane Oxygenator, bilateral sequential lung transplant;  Surgeon: Toby Hernandez MD;  Location: [facility]          Anesthesia Evaluation     . Pt has had prior anesthetic. Type: General    History of anesthetic complications   - PONV        ROS/MED HX    ENT/Pulmonary:     (+), . Other pulmonary disease s/p lung transplant for interstitial lung dz.    Neurologic:  - neg neurologic ROS     Cardiovascular:  - neg cardiovascular ROS   (+) ----. : . . . :. . pulmonary hypertension, Previous cardiac testing date:results:date: results:ECG reviewed date:6-6-19 results:Normal sinus rhythm  Voltage criteria for left ventricular hypertrophy  Nonspecific T wave abnormality  Abnormal ECG  When compared with ECG of 15-APR-2018 06:37,  Premature atrial complexes are no longer Present  Incomplete right bundle branch block is no longer Present  date: results:          METS/Exercise Tolerance:     Hematologic:     (+) Other Hematologic Disorder-h/o dvt      Musculoskeletal:   (+) arthritis,  other musculoskeletal- Raynaud's Dz; RA      GI/Hepatic:  - neg GI/hepatic ROS       Renal/Genitourinary:     (+) chronic renal disease, type: ARF,       Endo:     (+) Chronic steroid usage for Post Transplant Immunosuppression .      Psychiatric:  - neg psychiatric ROS       Infectious Disease:   (+) Other Infectious Disease CMV      Malignancy:      - no malignancy   Other:                         PHYSICAL EXAM:   Mental Status/Neuro: A/A/O   Airway: Facies: Feasible  Mallampati: II  Mouth/Opening: Full  TM distance: > 6 cm  Neck ROM: Full   Respiratory: Auscultation: Decreased BS     Resp. Rate: Normal     Resp. Effort: Normal      CV: Rhythm: Regular  Rate: Age appropriate  Heart: Normal Sounds   Comments:      Dental:  Dental Comments: Natural dentition with some missing teeth                Lab Results   Component Value Date    WBC 3.2 (L) 06/05/2019    HGB 9.9 (L) 06/05/2019    HCT 32.8 (L) 06/05/2019     06/05/2019     06/05/2019    POTASSIUM 4.1 06/05/2019    CHLORIDE 101 06/05/2019    CO2 28 06/05/2019    BUN 30 06/05/2019    CR 1.15 (H) 06/05/2019    GLC 92 06/05/2019    CHELSEY 9.9 06/05/2019    PHOS 2.6 08/03/2018    MAG 2.0 06/05/2019    ALBUMIN 2.8 (L) 01/16/2019    PROTTOTAL 7.4 01/16/2019    ALT 11 01/16/2019    AST 12 01/16/2019    ALKPHOS 65 01/16/2019    BILITOTAL 0.3 01/16/2019    AMYLASE 58 02/19/2018    PTT 26 08/03/2018    INR 1.01 06/05/2019    FIBR 279 03/05/2018    TSH 1.80 08/01/2018       Preop Vitals  BP Readings from Last 3 Encounters:   06/05/19 176/90   03/07/19 152/88   03/06/19 (!) 167/94    Pulse Readings from Last 3 Encounters:   06/05/19 84   03/07/19 79   03/06/19 84      Resp Readings from Last 3 Encounters:   03/07/19 16   01/17/19 16   12/14/18 18    SpO2 Readings from Last 3 Encounters:   06/05/19 99%   03/07/19 98%  "  03/06/19 100%      Temp Readings from Last 1 Encounters:   03/07/19 37  C (98.6  F) (Oral)    Ht Readings from Last 1 Encounters:   06/05/19 1.613 m (5' 3.5\")      Wt Readings from Last 1 Encounters:   06/05/19 54.4 kg (119 lb 14.4 oz)    Estimated body mass index is 20.91 kg/m  as calculated from the following:    Height as of 6/5/19: 1.613 m (5' 3.5\").    Weight as of 6/5/19: 54.4 kg (119 lb 14.4 oz).     LDA:            Assessment:   ASA SCORE: 3    NPO Status: > 2 hours since completed Clear Liquids; > 6 hours since completed Solid Foods      Proceeding: Proceed without further delay  Tobacco Use:  NO Active use of Tobacco/UNKNOWN Tobacco use status     Plan:   Anes. Type:  General   Pre-Induction: Midazolam IV   Induction:  IV (Standard)   Airway: Oral ETT   Access/Monitoring: PIV   Maintenance: Balanced   Emergence: Procedure Site   Logistics: Same Day Surgery     Postop Pain/Sedation Strategy:  Standard-Options: Opioids PRN     PONV Management:  Adult Risk Factors: Female, H/o PONV or Motion Sickness, Non-Smoker, Postop Opioids  Prevention: Ondansetron; Dexamethasone     CONSENT: Direct conversation   Plan and risks discussed with: Patient   Blood Products: Consent Deferred (Minimal Blood Loss)       Comments for Plan/Consent:  10/29/18; 1450; Mask Ventilation: Easy; Ease of Intubation: Easy; Airway Size: 7;  Cuffed;  Oral;  Blade Type: Walters;  Blade Size: 2;  Place by: JESSICA;  Insertion Attempts: 1;  Secured at (cm)to lip: 20 cm;  Breath Sounds: Equal, clear and bilateral;  End Tidal CO2: Present;  Dentition: Intact, Unchanged;  Grade View of Cords: 2 (clear and open)                         Duane F. Szczepanski, MD  "

## 2019-06-05 NOTE — NURSING NOTE
Chief Complaint   Patient presents with     Follow Up     pre op     Vitals were taken and medications were reconciled.     KVNG Gabriel

## 2019-06-05 NOTE — PATIENT INSTRUCTIONS
PATIENT INSTRUCTIONS:    1. Increase metoprolol to 50 mg (two tablets) twice/day.  2. Okay to use Tylenol 1000 mg in the am for your knee pain.  3. F/u with Orthopedics in [place].   4. We will message Dr. Patel (Infectious ID) regarding the need to continue Valcyte after today's labs have returned.     Thoracic Transplant Office phone [phone], fax [phone]  Office Hours 8:30 - 5:00     For after-hours urgent issues, please dial [phone], option [phone] and ask to speak with the Thoracic Transplant Coordinator  On-Call, pager [phone].  --------------------  To expedite your medication refill(s), please contact your pharmacy and have them fax a refill request to: [phone]  .   *Please allow 3 business days for routine medication refills.  *Please allow 5 business days for controlled substance medication refills.    **For Diabetic medications and supplies refill(s), please contact your pharmacy and have them  Contact your Endocrine team.  --------------------  For scheduling appointments call [facility] :  [phone]. For lab appointments call [phone] or [facility].  --------------------  Please Note: If you are active on [employer], all future test results will be sent by [employer] message only, and will no longer be called to patient. You may also receive communication directly from your physician.

## 2019-06-05 NOTE — LETTER
6/5/2019       RE: Kecia Blue  [address] [place] [address]     Dear Colleague,    Thank you for referring your patient, Kecia Blue, to the [facility] at [facility] Please see a copy of my visit note below.    [facility]  June 5, 2019         Assessment and Plan:   Mrs. Kecia Blue is a 58 y/o female with h/o dermatomyositis, seronegative RA, ILD and pulmonary hypertension s/p bilateral lung transplant on 3/1/18. Post operative course complicated by right main bronchial stenosis, positive DSAs, CMV viremia, C difficile and CMV viremia. She is seen today for  routine follow up.      Coordinator/MD: AMANDA/JONO     1.  H/o CMV viremia: last serum CMV  negative on 5/28/19. Serum values have been negative since February. Has not followed up with ID, last seen in March.   - CMV added on to today  - Continue valcyte 450 BID  - Will message Dr. Patel after today's results for plan/need for follow up     2.  Leukopenia: with WBC of 3.2 today. Improved from prior.  - Imuran remains on hold      3. S/p bilateral lung transplant: feeling well with improved exercise endurance (although activity limited by knee pain). Does have productive cough of whitish mucous in the am. Sating 99% on room air. DSA 3/5/19 negative. CXR reviewed by me today demonstrates stable transplant with small left effusion. PFTs improved 13% today from prior. Scheduled for bronch in OR tomorrow.   - Continue tacrolimus (goal 8-10) and prednisone  - Bactrim prophylaxis   - Add on 12 month high risk labs     4. Right main bronchial stenosis s/p dilatation: most recently on 3/7/19.  No stent currently in place. Overall feeling well with improved PFTs.   - Continue pentoxyfilline  - Cleared for the OR tomorrow     5. CKD: Cr stable today at 1.15.   - Avoid nephrotoxins  - Continue hydration     6.  HTN: BP in clinic 176/90, patient notes it's been elevated at home ranging from 140-150s/80-90s.  - Increase metoprolol to 50 mg BID     7. Bilateral knee pain: with bilateral effusions, L>R, feels the pain is progressive over the last months. No warmth, erythema or tenderness. Mobility is limited secondary to knee pain; did have a steroid injection in her left knee last July.  - WIll f/u with [facility] in [place]  - Tylenol 1000 mg PRN every 8 hours, recommend in the am to improve her pain/mobility during the day    Chronic issus:  1. Dermatomyositis     RTC: pending bronch results  Influenza and other vaccinations: gave second dose of Shingrix today  Annual dermatology visit: ???     Ame Chow PA-C  Pulmonary, Allergy, Critical Care and Sleep Medicine        Interval History:   Feeling well, only this that bothers her are her knees. Off the Imuran and has noticed her BP has gone up. Breathing is fine, shortness of breath quick movement otherwise very stable. Cough is present in am, productive every 3rd day, white/clear. No chest pain or palpitations. No GI complaints, stools are good, 1 bm every day.           Review of Systems:   Please see HPI, otherwise the complete 10 point ROS is negative.           Past Medical and Surgical History:     Past Medical History:   Diagnosis Date     Antisynthetase syndrome (H) 2014     Chronic cough      Chronic infection     recent C diff  8/18     Dehydration 8/1/2018     Dermatomyositis (H)      Dysplasia of cervix, low grade (ESTRADA 1)      ILD (interstitial lung disease) (H)      Osteopenia      PONV (postoperative nausea and vomiting)      Pulmonary hypertension (H)      Raynaud's disease      Seronegative rheumatoid arthritis (H)      Past Surgical History:   Procedure Laterality Date     BRONCHOSCOPY (RIGID OR FLEXIBLE), DIAGNOSTIC N/A 4/10/2018    Procedure: COMBINED BRONCHOSCOPY (RIGID OR FLEXIBLE), LAVAGE;;  Surgeon: Mariposa Donohue MD;  Location:  [facility]     BRONCHOSCOPY (RIGID OR FLEXIBLE), DILATE BRONCHUS / TRACHEA N/A 10/11/2018    Procedure: BRONCHOSCOPY (RIGID OR FLEXIBLE), DILATE BRONCHUS / TRACHEA;  Flexible And Rigid Bronchoscopy and Dilation;  Surgeon: Wilber Lin MD;  Location: [facility]     BRONCHOSCOPY FLEXIBLE N/A 3/13/2018    Procedure: BRONCHOSCOPY FLEXIBLE;  Flexible Bronchoscopy ;  Surgeon: Gissell Sanchez MD;  Location: [facility]     BRONCHOSCOPY FLEXIBLE N/A 5/9/2018    Procedure: BRONCHOSCOPY FLEXIBLE;;  Surgeon: Wilber Lin MD;  Location: [facility]     BRONCHOSCOPY FLEXIBLE AND RIGID N/A 9/10/2018    Procedure: BRONCHOSCOPY FLEXIBLE AND RIGID;  Flexible and Rigid Bronchoscopy with Balloon Dilation, tissue debulking with cryo, and Right mainstem bronchus stent placement;  Surgeon: Wilber Lin MD;  [facility]     BRONCHOSCOPY RIGID N/A 6/6/2018    Procedure: BRONCHOSCOPY RIGID;;  Surgeon: Lopez Macias MD;  [facility]: [facility]     BRONCHOSCOPY, DILATE BRONCHUS, STENT BRONCHUS, COMBINED N/A 6/11/2018    Procedure: COMBINED BRONCHOSCOPY, DILATE BRONCHUS, STENT BRONCHUS;  Flexible Bronchoscopy, Balloon Dilation, Bronchial Washings;  Surgeon: Wilber Lin MD;  [facility]     BRONCHOSCOPY, DILATE BRONCHUS, STENT BRONCHUS, COMBINED Right 7/10/2018    Procedure: COMBINED BRONCHOSCOPY, DILATE BRONCHUS, STENT BRONCHUS;  Flexible Bronchoscopy, Balloon Dilation, Bronchial Washings  ;  Surgeon: Wilber Lin MD;  [facility]     BRONCHOSCOPY, DILATE BRONCHUS, STENT BRONCHUS, COMBINED N/A 8/2/2018    Procedure: COMBINED BRONCHOSCOPY, DILATE BRONCHUS, STENT BRONCHUS;  Flexible Bronchoscopy, Bronchial Washings, Balloon Dilation;  Surgeon: Wilber Lin MD;  [facility]     BRONCHOSCOPY, DILATE BRONCHUS, STENT BRONCHUS, COMBINED N/A 8/20/2018    Procedure: COMBINED BRONCHOSCOPY, DILATE BRONCHUS, STENT BRONCHUS;  Flexible Bronchoscopy, Balloon Dilation;  Surgeon: Wilber Lin  MD Alana;  [facility]     BRONCHOSCOPY, DILATE BRONCHUS, STENT BRONCHUS, COMBINED N/A 10/29/2018    Procedure: Flexible Bronchoscopy, Balloon Dilation, Stent Revision, Airway Examination And Therapeutic Suctioning, Cyro Tumor Debulking;  Surgeon: Wilber Lin MD;  [facility]     BRONCHOSCOPY, DILATE BRONCHUS, STENT BRONCHUS, COMBINED N/A 11/20/2018    Procedure: Rigid Bronchoscopy, Stent Removal and dilitation;  Surgeon: Wilber Lin MD;  [facility]     BRONCHOSCOPY, DILATE BRONCHUS, STENT BRONCHUS, COMBINED N/A 12/14/2018    Procedure: Flexible And Rigid Bronchoscopy, Balloon Dilation, Bronchial Washing;  Surgeon: Wilber Lin MD;  [facility]     BRONCHOSCOPY, DILATE BRONCHUS, STENT BRONCHUS, COMBINED N/A 1/17/2019    Procedure: Flexible And Rigid Bronchoscopy, Balloon Dilation.;  Surgeon: Wilber Lin MD;  [facility]     BRONCHOSCOPY, DILATE BRONCHUS, STENT BRONCHUS, COMBINED N/A 3/7/2019    Procedure: Flexible and Rigid Bronchoscopy, Bronchial Washing, Balloon Dilation;  Surgeon: Wilber Lin MD;  [facility]     ENT SURGERY      tonsillectomy as a child     ESOPHAGOSCOPY, GASTROSCOPY, DUODENOSCOPY (EGD), COMBINED N/A 10/29/2018    Procedure: COMBINED ESOPHAGOSCOPY, GASTROSCOPY, DUODENOSCOPY (EGD) with biopsies and polypectomy;  Surgeon: Chente Bloom MD;  [facility]     INSERT EXTRACORPORAL MEMBRANE OXYGENATOR ADULT (OUTSIDE OR) N/A 2/27/2018    Procedure: INSERT EXTRACORPORAL MEMBRANE OXYGENATOR ADULT (OUTSIDE OR);  INSERT EXTRACORPORAL MEMBRANE OXYGENATOR ADULT (OUTSIDE OR) ;  Surgeon: Toby Hernandez MD;  Location: [facility]     no prior surgery       REMOVE EXTRACORPORAL MEMBRANE OXYGENATOR ADULT N/A 3/3/2018    Procedure: REMOVE EXTRACORPORAL MEMBRANE OXYGENATOR ADULT;  Removal of Right Femoral Venous and Right Axillary Arterial Extracorporeal Membrane Oxygenator;  Surgeon: Toby Hernandez MD;   [facility]     TRANSPLANT LUNG RECIPIENT SINGLE X2 Bilateral 3/1/2018    Procedure: TRANSPLANT LUNG RECIPIENT SINGLE X2;  Median Sternotomy, Extracorporeal Membrane Oxygenator, bilateral sequential lung transplant;  Surgeon: Toby Hernandez MD;  [facility]: [facility]           Family History:     Family History   Problem Relation Age of Onset     Hypertension Mother      Arthritis Mother      Pancreatic Cancer Father      Diabetes Father             Social History:     Social History     Socioeconomic History     Marital status:      Spouse name: Not on file     Number of children: Not on file     Years of education: Not on file     Highest education level: Not on file   Occupational History     Not on file   Social Needs     Financial resource strain: Not on file     Food insecurity:     Worry: Not on file     Inability: Not on file     Transportation needs:     Medical: Not on file     Non-medical: Not on file   Tobacco Use     Smoking status: Never Smoker     Smokeless tobacco: Never Used   Substance and Sexual Activity     Alcohol use: No     Alcohol/week: 0.6 oz     Types: 1 Glasses of wine per week     Drug use: No     Sexual activity: Not on file   Lifestyle     Physical activity:     Days per week: Not on file     Minutes per session: Not on file     Stress: Not on file   Relationships     Social connections:     Talks on phone: Not on file     Gets together: Not on file     Attends Islam service: Not on file     Active member of club or organization: Not on file     Attends meetings of clubs or organizations: Not on file     Relationship status: Not on file     Intimate partner violence:     Fear of current or ex partner: Not on file     Emotionally abused: Not on file     Physically abused: Not on file     Forced sexual activity: Not on file   Other Topics Concern     Parent/sibling w/ CABG, MI or angioplasty before 65F 55M? No   Social History Narrative    3/6/2019 - Lives with  ". Has three daughters. Four grandchildren (two ). No pets. Travelled previously to [place], [place]. Has visited [place] several times.             Medications:     Current Outpatient Medications   Medication     ACETAMINOPHEN PO     calcium-vitamin D (CALTRATE) 600-400 MG-UNIT per tablet     ferrous sulfate (FEROSUL) 325 (65 Fe) MG tablet     magnesium oxide (MAG-OX) 400 MG tablet     metoprolol tartrate (LOPRESSOR) 25 MG tablet     multivitamin, therapeutic with minerals (THERA-VIT-M) TABS tablet     ondansetron (ZOFRAN) 4 MG tablet     order for DME     order for DME     pantoprazole (PROTONIX) 40 MG EC tablet     pentoxifylline (TRENTAL) 400 MG CR tablet     predniSONE (DELTASONE) 2.5 MG tablet     sulfamethoxazole-trimethoprim (BACTRIM/SEPTRA) 400-80 MG tablet     tacrolimus (GENERIC EQUIVALENT) 0.5 MG capsule     tacrolimus (GENERIC EQUIVALENT) 1 MG capsule     valGANciclovir (VALCYTE) 450 MG tablet     Current Facility-Administered Medications   Medication     zoster vaccine recombinant adjuvanted (SHINGRIX) injection 0.5 mL            Physical Exam:   /90   Pulse 84   Ht 1.613 m (5' 3.5\")   Wt 54.4 kg (119 lb 14.4 oz)   LMP 2014 (Exact Date)   SpO2 99%   BMI 20.91 kg/m       GENERAL: alert, NAD  HEENT: NCAT, EOMI, no scleral icterus, oral mucosa moist and without lesions  Neck: no cervical or supraclavicular adenopathy  Lungs: good air flow, no crackles, rhonchi or wheezing  CV: RRR, S1S2, no murmurs noted  Abdomen: normoactive BS, soft, non tender   Lymph: no edema  Neuro: AAO X 3, CN 2-12 grossly intact  Psychiatric: normal affect, good eye contact  Skin: no rash, jaundice or lesions on limited exam         Data:   All laboratory and imaging data reviewed.      Recent Results (from the past 168 hour(s))   INR    Collection Time: 19  9:22 AM   Result Value Ref Range    INR 1.01 0.86 - 1.14   Magnesium    Collection Time: 19  9:22 AM   Result Value Ref Range    " Magnesium 2.0 1.6 - 2.3 mg/dL   Basic metabolic panel    Collection Time: 06/05/19  9:22 AM   Result Value Ref Range    Sodium 136 133 - 144 mmol/L    Potassium 4.1 3.4 - 5.3 mmol/L    Chloride 101 94 - 109 mmol/L    Carbon Dioxide 28 20 - 32 mmol/L    Anion Gap 7 3 - 14 mmol/L    Glucose 92 70 - 99 mg/dL    Urea Nitrogen 30 7 - 30 mg/dL    Creatinine 1.15 (H) 0.52 - 1.04 mg/dL    GFR Estimate 53 (L) >60 mL/min/[1.73_m2]    GFR Estimate If Black 61 >60 mL/min/[1.73_m2]    Calcium 9.9 8.5 - 10.1 mg/dL   CBC with platelets    Collection Time: 06/05/19  9:22 AM   Result Value Ref Range    WBC 3.2 (L) 4.0 - 11.0 10e9/L    RBC Count 3.16 (L) 3.8 - 5.2 10e12/L    Hemoglobin 9.9 (L) 11.7 - 15.7 g/dL    Hematocrit 32.8 (L) 35.0 - 47.0 %     (H) 78 - 100 fl    MCH 31.3 26.5 - 33.0 pg    MCHC 30.2 (L) 31.5 - 36.5 g/dL    RDW 15.1 (H) 10.0 - 15.0 %    Platelet Count 278 150 - 450 10e9/L   General PFT Lab (Please always keep checked)    Collection Time: 06/05/19 10:10 AM   Result Value Ref Range    FVC-Pred 3.23 L    FVC-Pre 2.26 L    FVC-%Pred-Pre 70 %    FEV1-Pre 1.65 L    FEV1-%Pred-Pre 64 %    FEV1FVC-Pred 80 %    FEV1FVC-Pre 73 %    FEFMax-Pred 6.42 L/sec    FEFMax-Pre 3.66 L/sec    FEFMax-%Pred-Pre 57 %    FEF2575-Pred 2.43 L/sec    FEF2575-Pre 1.17 L/sec    PBM9082-%Pred-Pre 48 %    ExpTime-Pre 7.42 sec    FIFMax-Pre 3.18 L/sec    VC-Pred 3.29 L    VC-Pre 1.93 L    VC-%Pred-Pre 58 %    IC-Pred 2.24 L    IC-Pre 1.14 L    IC-%Pred-Pre 50 %    ERV-Pred 1.06 L    ERV-Pre 0.79 L    ERV-%Pred-Pre 75 %    FEV1FEV6-Pred 81 %    FEV1FEV6-Pre 73 %    DLCOunc-Pred 20.20 ml/min/mmHg    DLCOunc-Pre 11.22 ml/min/mmHg    DLCOunc-%Pred-Pre 55 %    DLCOcor-Pre 12.85 ml/min/mmHg    DLCOcor-%Pred-Pre 63 %    VA-Pre 2.67 L    VA-%Pred-Pre 56 %    FEV1SVC-Pred 78 %    FEV1SVC-Pre 85 %     PFT interpretation:  Maneuver: valid and meets ATS guidelines  Normal ratio with decreased FEV1 and FVC  Compared to prior: FEV1 of 1.65 is 340 ml  above prior  The decrease in FVC may represent restrictive physiology.  Lung volumes would be necessary to determine.    Again, thank you for allowing me to participate in the care of your patient.      Sincerely,    Ame Chow PA-C

## 2019-06-06 ENCOUNTER — HOSPITAL ENCOUNTER (OUTPATIENT)
Facility: CLINIC | Age: 59
Discharge: HOME | End: 2019-06-06
Attending: INTERNAL MEDICINE | Admitting: INTERNAL MEDICINE
Payer: COMMERCIAL

## 2019-06-06 ENCOUNTER — TELEPHONE (OUTPATIENT)
Dept: TRANSPLANT | Facility: CLINIC | Age: 59
End: 2019-06-06

## 2019-06-06 ENCOUNTER — ANESTHESIA (OUTPATIENT)
Dept: SURGERY | Facility: CLINIC | Age: 59
End: 2019-06-06
Payer: COMMERCIAL

## 2019-06-06 VITALS
BODY MASS INDEX: 20.81 KG/M2 | WEIGHT: 121.91 LBS | HEART RATE: 84 BPM | SYSTOLIC BLOOD PRESSURE: 163 MMHG | HEIGHT: 64 IN | TEMPERATURE: 98.1 F | RESPIRATION RATE: 16 BRPM | DIASTOLIC BLOOD PRESSURE: 93 MMHG | OXYGEN SATURATION: 100 %

## 2019-06-06 LAB
DLCOCOR-%PRED-PRE: 63 %
DLCOCOR-PRE: 12.85 ML/MIN/MMHG
DLCOUNC-%PRED-PRE: 55 %
DLCOUNC-PRE: 11.22 ML/MIN/MMHG
DLCOUNC-PRED: 20.2 ML/MIN/MMHG
DONOR IDENTIFICATION: NORMAL
DSA COMMENTS: NORMAL
DSA PRESENT: NO
DSA TEST METHOD: NORMAL
ERV-%PRED-PRE: 75 %
ERV-PRE: 0.79 L
ERV-PRED: 1.06 L
EXPTIME-PRE: 7.42 SEC
FEF2575-%PRED-PRE: 48 %
FEF2575-PRE: 1.17 L/SEC
FEF2575-PRED: 2.43 L/SEC
FEFMAX-%PRED-PRE: 57 %
FEFMAX-PRE: 3.66 L/SEC
FEFMAX-PRED: 6.42 L/SEC
FEV1-%PRED-PRE: 64 %
FEV1-PRE: 1.65 L
FEV1FEV6-PRE: 73 %
FEV1FEV6-PRED: 81 %
FEV1FVC-PRE: 73 %
FEV1FVC-PRED: 80 %
FEV1SVC-PRE: 85 %
FEV1SVC-PRED: 78 %
FIFMAX-PRE: 3.18 L/SEC
FVC-%PRED-PRE: 70 %
FVC-PRE: 2.26 L
FVC-PRED: 3.23 L
GLUCOSE BLDC GLUCOMTR-MCNC: 113 MG/DL (ref 70–99)
HCG UR QL: NEGATIVE
IC-%PRED-PRE: 50 %
IC-PRE: 1.14 L
IC-PRED: 2.24 L
ORGAN: NORMAL
SA1 CELL: NORMAL
SA1 COMMENTS: NORMAL
SA1 HI RISK ABY: NORMAL
SA1 MOD RISK ABY: NORMAL
SA1 TEST METHOD: NORMAL
SA2 CELL: NORMAL
SA2 COMMENTS: NORMAL
SA2 HI RISK ABY UA: NORMAL
SA2 MOD RISK ABY: NORMAL
SA2 TEST METHOD: NORMAL
UNACCEPTABLE ANTIGEN: NORMAL
UNOS CPRA: 0
VA-%PRED-PRE: 56 %
VA-PRE: 2.67 L
VC-%PRED-PRE: 58 %
VC-PRE: 1.93 L
VC-PRED: 3.29 L

## 2019-06-06 PROCEDURE — 93010 ELECTROCARDIOGRAM REPORT: CPT | Performed by: INTERNAL MEDICINE

## 2019-06-06 PROCEDURE — 71000016 ZZH RECOVERY PHASE 1 LEVEL 3 FIRST HR: Performed by: INTERNAL MEDICINE

## 2019-06-06 PROCEDURE — 36000059 ZZH SURGERY LEVEL 3 EA 15 ADDTL MIN UMMC: Performed by: INTERNAL MEDICINE

## 2019-06-06 PROCEDURE — 25800030 ZZH RX IP 258 OP 636: Performed by: ANESTHESIOLOGY

## 2019-06-06 PROCEDURE — 27210794 ZZH OR GENERAL SUPPLY STERILE: Performed by: INTERNAL MEDICINE

## 2019-06-06 PROCEDURE — 37000009 ZZH ANESTHESIA TECHNICAL FEE, EACH ADDTL 15 MIN: Performed by: INTERNAL MEDICINE

## 2019-06-06 PROCEDURE — 82962 GLUCOSE BLOOD TEST: CPT

## 2019-06-06 PROCEDURE — 25000128 H RX IP 250 OP 636: Performed by: ANESTHESIOLOGY

## 2019-06-06 PROCEDURE — 25000125 ZZHC RX 250: Performed by: ANESTHESIOLOGY

## 2019-06-06 PROCEDURE — 40000170 ZZH STATISTIC PRE-PROCEDURE ASSESSMENT II: Performed by: INTERNAL MEDICINE

## 2019-06-06 PROCEDURE — 25000125 ZZHC RX 250: Performed by: NURSE ANESTHETIST, CERTIFIED REGISTERED

## 2019-06-06 PROCEDURE — 25000128 H RX IP 250 OP 636: Performed by: NURSE ANESTHETIST, CERTIFIED REGISTERED

## 2019-06-06 PROCEDURE — 37000008 ZZH ANESTHESIA TECHNICAL FEE, 1ST 30 MIN: Performed by: INTERNAL MEDICINE

## 2019-06-06 PROCEDURE — 81025 URINE PREGNANCY TEST: CPT | Performed by: ANESTHESIOLOGY

## 2019-06-06 PROCEDURE — 71000027 ZZH RECOVERY PHASE 2 EACH 15 MINS: Performed by: INTERNAL MEDICINE

## 2019-06-06 PROCEDURE — 40000065 ZZH STATISTIC EKG NON-CHARGEABLE

## 2019-06-06 PROCEDURE — 36000057 ZZH SURGERY LEVEL 3 1ST 30 MIN - UMMC: Performed by: INTERNAL MEDICINE

## 2019-06-06 PROCEDURE — C1726 CATH, BAL DIL, NON-VASCULAR: HCPCS | Performed by: INTERNAL MEDICINE

## 2019-06-06 RX ORDER — ONDANSETRON 2 MG/ML
4 INJECTION INTRAMUSCULAR; INTRAVENOUS EVERY 30 MIN PRN
Status: DISCONTINUED | OUTPATIENT
Start: 2019-06-06 | End: 2019-06-06 | Stop reason: HOSPADM

## 2019-06-06 RX ORDER — IPRATROPIUM BROMIDE AND ALBUTEROL SULFATE 2.5; .5 MG/3ML; MG/3ML
3 SOLUTION RESPIRATORY (INHALATION) ONCE
Status: COMPLETED | OUTPATIENT
Start: 2019-06-06 | End: 2019-06-06

## 2019-06-06 RX ORDER — MEPERIDINE HYDROCHLORIDE 25 MG/ML
12.5 INJECTION INTRAMUSCULAR; INTRAVENOUS; SUBCUTANEOUS
Status: DISCONTINUED | OUTPATIENT
Start: 2019-06-06 | End: 2019-06-06 | Stop reason: HOSPADM

## 2019-06-06 RX ORDER — LIDOCAINE HYDROCHLORIDE 20 MG/ML
INJECTION, SOLUTION INFILTRATION; PERINEURAL PRN
Status: DISCONTINUED | OUTPATIENT
Start: 2019-06-06 | End: 2019-06-06

## 2019-06-06 RX ORDER — SODIUM CHLORIDE, SODIUM LACTATE, POTASSIUM CHLORIDE, CALCIUM CHLORIDE 600; 310; 30; 20 MG/100ML; MG/100ML; MG/100ML; MG/100ML
INJECTION, SOLUTION INTRAVENOUS CONTINUOUS
Status: DISCONTINUED | OUTPATIENT
Start: 2019-06-06 | End: 2019-06-06 | Stop reason: HOSPADM

## 2019-06-06 RX ORDER — ONDANSETRON 4 MG/1
4 TABLET, ORALLY DISINTEGRATING ORAL EVERY 30 MIN PRN
Status: DISCONTINUED | OUTPATIENT
Start: 2019-06-06 | End: 2019-06-06 | Stop reason: HOSPADM

## 2019-06-06 RX ORDER — PROPOFOL 10 MG/ML
INJECTION, EMULSION INTRAVENOUS CONTINUOUS PRN
Status: DISCONTINUED | OUTPATIENT
Start: 2019-06-06 | End: 2019-06-06

## 2019-06-06 RX ORDER — FENTANYL CITRATE 50 UG/ML
25-50 INJECTION, SOLUTION INTRAMUSCULAR; INTRAVENOUS EVERY 5 MIN PRN
Status: DISCONTINUED | OUTPATIENT
Start: 2019-06-06 | End: 2019-06-06 | Stop reason: HOSPADM

## 2019-06-06 RX ORDER — PROPOFOL 10 MG/ML
INJECTION, EMULSION INTRAVENOUS PRN
Status: DISCONTINUED | OUTPATIENT
Start: 2019-06-06 | End: 2019-06-06

## 2019-06-06 RX ORDER — NALOXONE HYDROCHLORIDE 0.4 MG/ML
.1-.4 INJECTION, SOLUTION INTRAMUSCULAR; INTRAVENOUS; SUBCUTANEOUS
Status: DISCONTINUED | OUTPATIENT
Start: 2019-06-06 | End: 2019-06-06 | Stop reason: HOSPADM

## 2019-06-06 RX ORDER — MEPERIDINE HYDROCHLORIDE 50 MG/ML
12.5 INJECTION INTRAMUSCULAR; INTRAVENOUS; SUBCUTANEOUS
Status: DISCONTINUED | OUTPATIENT
Start: 2019-06-06 | End: 2019-06-06 | Stop reason: HOSPADM

## 2019-06-06 RX ORDER — LABETALOL 20 MG/4 ML (5 MG/ML) INTRAVENOUS SYRINGE
10
Status: COMPLETED | OUTPATIENT
Start: 2019-06-06 | End: 2019-06-06

## 2019-06-06 RX ORDER — LIDOCAINE 40 MG/G
CREAM TOPICAL
Status: DISCONTINUED | OUTPATIENT
Start: 2019-06-06 | End: 2019-06-06 | Stop reason: HOSPADM

## 2019-06-06 RX ORDER — FENTANYL CITRATE 50 UG/ML
INJECTION, SOLUTION INTRAMUSCULAR; INTRAVENOUS PRN
Status: DISCONTINUED | OUTPATIENT
Start: 2019-06-06 | End: 2019-06-06

## 2019-06-06 RX ORDER — DEXAMETHASONE SODIUM PHOSPHATE 4 MG/ML
INJECTION, SOLUTION INTRA-ARTICULAR; INTRALESIONAL; INTRAMUSCULAR; INTRAVENOUS; SOFT TISSUE PRN
Status: DISCONTINUED | OUTPATIENT
Start: 2019-06-06 | End: 2019-06-06

## 2019-06-06 RX ORDER — FENTANYL CITRATE 50 UG/ML
25-50 INJECTION, SOLUTION INTRAMUSCULAR; INTRAVENOUS
Status: DISCONTINUED | OUTPATIENT
Start: 2019-06-06 | End: 2019-06-06 | Stop reason: HOSPADM

## 2019-06-06 RX ADMIN — PROPOFOL 175 MCG/KG/MIN: 10 INJECTION, EMULSION INTRAVENOUS at 09:37

## 2019-06-06 RX ADMIN — ROCURONIUM BROMIDE 30 MG: 10 INJECTION INTRAVENOUS at 09:48

## 2019-06-06 RX ADMIN — FENTANYL CITRATE 50 MCG: 50 INJECTION, SOLUTION INTRAMUSCULAR; INTRAVENOUS at 09:55

## 2019-06-06 RX ADMIN — DEXAMETHASONE SODIUM PHOSPHATE 8 MG: 4 INJECTION, SOLUTION INTRA-ARTICULAR; INTRALESIONAL; INTRAMUSCULAR; INTRAVENOUS; SOFT TISSUE at 09:50

## 2019-06-06 RX ADMIN — SODIUM CHLORIDE, POTASSIUM CHLORIDE, SODIUM LACTATE AND CALCIUM CHLORIDE: 600; 310; 30; 20 INJECTION, SOLUTION INTRAVENOUS at 09:28

## 2019-06-06 RX ADMIN — FENTANYL CITRATE 50 MCG: 50 INJECTION, SOLUTION INTRAMUSCULAR; INTRAVENOUS at 09:48

## 2019-06-06 RX ADMIN — MIDAZOLAM 1 MG: 1 INJECTION INTRAMUSCULAR; INTRAVENOUS at 09:28

## 2019-06-06 RX ADMIN — PROPOFOL 170 MG: 10 INJECTION, EMULSION INTRAVENOUS at 09:48

## 2019-06-06 RX ADMIN — ONDANSETRON 4 MG: 2 INJECTION INTRAMUSCULAR; INTRAVENOUS at 10:03

## 2019-06-06 RX ADMIN — IPRATROPIUM BROMIDE AND ALBUTEROL SULFATE 3 ML: .5; 3 SOLUTION RESPIRATORY (INHALATION) at 07:58

## 2019-06-06 RX ADMIN — LABETALOL 20 MG/4 ML (5 MG/ML) INTRAVENOUS SYRINGE 10 MG: at 11:11

## 2019-06-06 RX ADMIN — LIDOCAINE HYDROCHLORIDE 100 MG: 20 INJECTION, SOLUTION INFILTRATION; PERINEURAL at 09:48

## 2019-06-06 RX ADMIN — MIDAZOLAM 1 MG: 1 INJECTION INTRAMUSCULAR; INTRAVENOUS at 09:30

## 2019-06-06 RX ADMIN — SUGAMMADEX 200 MG: 100 INJECTION, SOLUTION INTRAVENOUS at 10:06

## 2019-06-06 RX ADMIN — LABETALOL 20 MG/4 ML (5 MG/ML) INTRAVENOUS SYRINGE 10 MG: at 12:11

## 2019-06-06 ASSESSMENT — MIFFLIN-ST. JEOR: SCORE: 1128

## 2019-06-06 NOTE — ANESTHESIA POSTPROCEDURE EVALUATION
Anesthesia POST Procedure Evaluation    Patient: Kecia Blue   MRN:     [id number] Gender:   female   Age:    58 year old :      1960        Preoperative Diagnosis: Bronchial Stenosis   Procedure(s):  Rigid and Flexible Bronchoscopy, Balloon Dilation   Postop Comments: No value filed.       Anesthesia Type:  General  No value filed.    Reportable Event: NO     PAIN: Uncomplicated   Sign Out status: Comfortable, Well controlled pain     PONV: No PONV   Sign Out status:  No Nausea or Vomiting     Neuro/Psych: Uneventful perioperative course   Sign Out Status: Preoperative baseline; Age appropriate mentation     Airway/Resp.: Uneventful perioperative course   Sign Out Status: Non labored breathing, age appropriate RR; Resp. Status within EXPECTED Parameters     CV: Uneventful perioperative course   Sign Out status: Appropriate BP and perfusion indices; Appropriate HR/Rhythm     Disposition:   Sign Out in:  PACU  Disposition:  Phase II; Home  Recovery Course: Uneventful  Follow-Up: Not required           Last Anesthesia Record Vitals:  CRNA VITALS  2019 0944 - 2019 1044      2019             NIBP:  145/87    NIBP Mean:  101    Ht Rate:  87    SpO2:  100 %          Last PACU Vitals:  Vitals Value Taken Time   /93 2019 10:50 AM   Temp 36.7  C (98  F) 2019 10:45 AM   Pulse 81 2019 10:50 AM   Resp 20 2019 10:45 AM   SpO2 98 % 2019 10:53 AM   Temp src     NIBP 145/87 2019 10:18 AM   Pulse     SpO2 100 % 2019 10:18 AM   Resp     Temp     Ht Rate 87 2019 10:18 AM   Temp 2     Vitals shown include unvalidated device data.      Electronically Signed By: Duane F. Szczepanski, MD, 2019, 10:53 AM

## 2019-06-06 NOTE — DISCHARGE INSTRUCTIONS
Post Bronchoscopy Patient Instructions:    2019  Kecia Blue    Your procedure completed (bronchoscopy with dilation) without any immediate complications.    You may cough up scant amount of blood for the next 12 hours. If you have excessive cough with blood, chest pain, shortness of breath, please report to the closest emergency room.    You may experience low grade (less than 100.5 F) fever next 24 hours, if so you can take tylenol. If the fever persists more than 24 hours contact to our office or your primary care provider.    May resume your regular diet as it was prior to procedure.    Should you have any question, please do not hesitate to call our office.    DA Lin MD    [facility], [place]  Same-Day Surgery   Adult Discharge Orders & Instructions     For 24 hours after surgery    1. Get plenty of rest.  A responsible adult must stay with you for at least 24 hours after you leave the hospital.   2. Do not drive or use heavy equipment.  If you have weakness or tingling, don't drive or use heavy equipment until this feeling goes away.  3. Do not drink alcohol.  4. Avoid strenuous or risky activities.  Ask for help when climbing stairs.   5. You may feel lightheaded.  IF so, sit for a few minutes before standing.  Have someone help you get up.   6. If you have nausea (feel sick to your stomach): Drink only clear liquids such as apple juice, ginger ale, broth or 7-Up.  Rest may also help.  Be sure to drink enough fluids.  Move to a regular diet as you feel able.  7. You may have a slight fever. Call the doctor if your fever is over 100 F (37.7 C) (taken under the tongue) or lasts longer than 24 hours.  8. You may have a dry mouth, a sore throat, muscle aches or trouble sleeping.  These should go away after 24 hours.  9. Do not make important or legal decisions.   Call your doctor for any of the followin.  Signs of infection (fever, growing tenderness at  the surgery site, a large amount of drainage or bleeding, severe pain, foul-smelling drainage, redness, swelling).    2. It has been over 8 to 10 hours since surgery and you are still not able to urinate (pass water).    3.  Headache for over 24 hours.    To contact a doctor, call Dr. Lin's office at [phone] or:        [phone] and ask for the resident on call for   Pulmonary (answered 24 hours a day)      Emergency Department:    [facility]: [phone]       (TTY for hearing impaired: [phone])

## 2019-06-06 NOTE — OR NURSING
Dr. Cornejo informed of patient's BP with labetalol 10 mg given and no change to BP, patient is okay to be discharged.

## 2019-06-06 NOTE — TELEPHONE ENCOUNTER
Notified patient okay to stop pentoxifylline. Patient agreed with plan.     ----- Message from Ame Chow PA-C sent at 6/6/2019 11:06 AM CDT -----  Sounds great. Thanks!    Mikayla, can you let Kecia know she can stop the pentoxyfilline?    ----- Message -----  From: Preet Patel MD  Sent: 6/6/2019  10:32 AM  To: Wilber Lin MD, #    Kecia is doing better.  Her airways are looking much improved vs. Previous.    We were thinking of repeat bronchoscopy in 4 months - she had a lot of trouble over the winter.    It looks like Alex started the pentoxyfilline.  There is not a reason to stay on it from our standpoint.    Thank you  Aelx

## 2019-06-06 NOTE — ANESTHESIA CARE TRANSFER NOTE
Patient: Kecia Blue    Procedure(s):  Rigid and Flexible Bronchoscopy, Balloon Dilation    Diagnosis: Bronchial Stenosis  Diagnosis Additional Information: No value filed.    Anesthesia Type:   No value filed.     Note:  Airway :Face Mask  Patient transferred to:PACU  Comments: Pt alert, breathing spontaneously on 6L O2 via FM. VSS. Report shared with RN.Handoff Report: Identifed the Patient, Identified the Reponsible Provider, Reviewed the pertinent medical history, Discussed the surgical course, Reviewed Intra-OP anesthesia mangement and issues during anesthesia, Set expectations for post-procedure period and Allowed opportunity for questions and acknowledgement of understanding      Vitals: (Last set prior to Anesthesia Care Transfer)    CRNA VITALS  6/6/2019 0944 - 6/6/2019 1017      6/6/2019             Resp Rate (observed):  1  (Abnormal)     Resp Rate (set):  10                Electronically Signed By: ADRIÁN Falk CRNA  June 6, 2019  10:17 AM

## 2019-06-06 NOTE — OR NURSING
Dr. Cornejo aware of blood pressure upon transport to  Phase II, pt instructed to take home high blood pressure medication when she gets home.

## 2019-06-06 NOTE — RESULT ENCOUNTER NOTE
Armand Mcdonnell, your tacrolimus level was 8.4 at 12 hours on 6/5/19 which is within your goal range of 8-10. No dose change at this time. Please call the transplant office ([phone]) with any questions. Thanks, Mikayla

## 2019-06-06 NOTE — PROCEDURES
INTERVENTIONAL PULMONOLOGY     Procedure(s):    A flexible and rigid bronchoscopy   Balloon bronchoplasty without stent placement (1 sites)     Indication:  B lung transplant with bronchial stenosis    Attending of Record:  Alana Lin MD    Interventional Pulmonary Fellow   Preet Patel MD     Trainees Present:   None    Medications:    General Anesthesia - See anesthesia flowsheet for details    Sedation Time:   Per Anesthesia Care Provider    Time Out:  Performed    The patient's medical record has been reviewed.  The indication for the procedure was reviewed.  The necessary history and physical examination was performed and reviewed.  The risks, benefits and alternatives of the procedure were discussed with the the patient in detail and she had the opportunity to ask questions.  I discussed in particular the potential complications including risks of minor or life-threatening bleeding and/or infection, respiratory failure, vocal cord trauma / paralysis, pneumothorax, and discomfort. Sedation risks were also discussed including abnormal heart rhythms, low blood pressure, and respiratory failure. All questions were answered to the best of my ability.  Verbal and written informed consent was obtained.  The proposed procedure and the patient's identification were verified prior to the procedure by the physician and the surgical team.    The patient was assessed for the adequacy for the procedure and to receive medications.   Mental Status:  Alert and oriented x 3  Airway examination:  Class II (Complete visualization of uvula)  Pulmonary:  Clear to ausculation bilaterally  CV:  RRR, no murmurs or gallops  ASA Grade:  (II)  Mild systemic disease    After clinical evaluation and reviewing the indication, risks, alternatives and benefits of the procedure the patient was deemed to be in satisfactory condition to undergo the procedure.           A Tuberculosis risk assessment was performed:  The patient has no  known RISK of Tuberculosis    The procedure was performed in a negative airflow room: The patient could not be moved to a negative airflow room because of needed OR for the procedure    Maneuvers / Procedure:      A Flexible and 8.5mm Rigid bronchoscope bronchoscope was used for the procedure. The rigid bronchoscope was inserted into the mouth. Uvula, epiglottis and vocal cords were seen. The scope was advanced turning the bevel to 90degress while passing through the cords and into the trachea.   Airway dilation: Airway dilation#1: The 8-9-10mm Elation Ballooon was used to dilate the Bronchus intermedius  airway. Each dilation was held for 60sec and repeated 3 times  Airway Examination: A complete airway examination was performed from the distal trachea to the subsegmental level in each lobe of both lungs.  Pertinent findings include normal L airway with healthy anastomosis.  R anastomosis with mild stenosis.  RUL widely patent. BI with moderate stenosis - could not pass therapeutic scope before dilation. RML and RLL widely patent.           Any disposable equipment was visually inspected and deemed to be intact immediately post procedure.      Relevant Pictures    Main gee      L anastomosis      KONRAD and LLL      R anastomosis      RUL      RML and RLL      BI      BI post dilation          Recommendations:   -->  Repeat bronchoscopy in 4 months        Preet Patel MD  Interventional Pulmonary  Department of Pulmonary, Allergy, Critical Care and Sleep Medicine   [facility]    Krystle CHAVEZ, OCN  Clinical Nurse Specialist  Department of Thoracic Surgery  Office: [phone]  Email: [email or web address]    Chloe Avila  Interventional Pulmonology Surgery Scheduler  Office: [phone]  Email: [email or web address]      I was present with the patient, Kecia Blue, for the entire viewing portion of the bronchscopy procedure (including scope insertion and withdrawal)  and agree with the interpretation and report as documented by Dr. Patel.   Alana Lin MD

## 2019-06-07 LAB
CMV DNA SPEC NAA+PROBE-ACNC: <137 [IU]/ML
CMV DNA SPEC NAA+PROBE-LOG#: <2.1 {LOG_IU}/ML
INTERPRETATION ECG - MUSE: NORMAL
SPECIMEN SOURCE: ABNORMAL

## 2019-06-08 ENCOUNTER — HOSPITAL ENCOUNTER (OUTPATIENT)
Facility: CLINIC | Age: 59
Setting detail: SPECIMEN
Discharge: HOME | End: 2019-06-08
Admitting: INTERNAL MEDICINE
Payer: COMMERCIAL

## 2019-06-08 PROCEDURE — 80197 ASSAY OF TACROLIMUS: CPT | Performed by: INTERNAL MEDICINE

## 2019-06-12 ENCOUNTER — EXTERNAL ORDER RESULTS (OUTPATIENT)
Dept: TRANSPLANT | Facility: CLINIC | Age: 59
End: 2019-06-12

## 2019-06-12 DIAGNOSIS — Z94.2 LUNG REPLACED BY TRANSPLANT (H): ICD-10-CM

## 2019-06-12 DIAGNOSIS — R11.2 INTRACTABLE VOMITING WITH NAUSEA, UNSPECIFIED VOMITING TYPE: ICD-10-CM

## 2019-06-12 DIAGNOSIS — I48.0 PAROXYSMAL ATRIAL FIBRILLATION (H): ICD-10-CM

## 2019-06-12 LAB
% BASOPHILS (EXTERNAL): 1.1 % (ref 0–1)
% EOSINOPHILS (EXTERNAL): 0.6 % (ref 0–3)
% IMMATURE GRANULOCYTES (EXTERNAL): 96 %
% LYMPHOCYTES (EXTERNAL): 16.1 % (ref 24–440)
% MONOCYTES (EXTERNAL): 2.8 % (ref 7–13)
% NEUTROPHILS (EXTERNAL): 69.8 % (ref 35–77)
ABSOLUTE BASOPHILS (EXTERNAL): 0 10(3)/UL (ref 0–0.1)
ABSOLUTE EOSINOPHILS (EXTERNAL): 0 10(3)/UL (ref 0–0.3)
ABSOLUTE IMMATURE GRANULOCYTES (EXTERNAL): 0.34 10(3)/UL
ABSOLUTE LYMPHOCYTES (EXTERNAL): 0.6 10(3)/UL (ref 1.1–5)
ABSOLUTE MONOCYTES (EXTERNAL): 0.1 10(3)/UL (ref 0.1–0.7)
ABSOLUTE NEUTROPHILS (EXTERNAL): 2.5 10(3)/UL (ref 1.5–8.5)
ANION GAP SERPL CALC-SCNC: 15.2 MMOL/L
BUN SERPL-MCNC: 27 MG/DL (ref 7–26)
BUN/CREATININE RATIO (EXTERNAL): 19.29 RATIO
CALCIUM (EXTERNAL): 10.3 MG/DL (ref 8.4–10.2)
CHLORIDE (EXTERNAL): 104 MMOL/L (ref 98–107)
CO2 (EXTERNAL): 23 MMOL/L (ref 20–31)
CREATININE (EXTERNAL): 1.4 MG/DL (ref 0.55–1.02)
ERYTHROCYTE [DISTWIDTH] IN BLOOD BY AUTOMATED COUNT: 15.6 % (ref 11.5–13.1)
GFR ESTIMATED (EXTERNAL): 42 ML/MIN/1.73M2
GLUCOSE (EXTERNAL): 128 MG/DL (ref 70–105)
HEMATOCRIT (EXTERNAL): 31.5 % (ref 33–51)
HEMOGLOBIN (EXTERNAL): 9.7 G/DL (ref 12–16)
MCH RBC QN AUTO: 31.5 PG (ref 26–34)
MCHC RBC AUTO-ENTMCNC: 30.8 G/DL (ref 32–36)
MCV RBC AUTO: 102.3 FL (ref 80–100)
PLATELET COUNT (EXTERNAL): 292 10(3)/UL (ref 150–450)
POTASSIUM (EXTERNAL): 4.2 MMOL/L (ref 3.5–5.1)
RBC # BLD AUTO: 3.08 10(6)/UL (ref 4–5.2)
SODIUM (EXTERNAL): 138 MMOL/L (ref 136–145)
WBC COUNT (EXTERNAL): 3.5 10(3)/UL (ref 4.5–11)

## 2019-06-12 PROCEDURE — 80197 ASSAY OF TACROLIMUS: CPT | Performed by: INTERNAL MEDICINE

## 2019-06-13 LAB
TACROLIMUS BLD-MCNC: 7.1 UG/L (ref 5–15)
TME LAST DOSE: NORMAL H

## 2019-06-14 NOTE — RESULT ENCOUNTER NOTE
Kecia, your tacrolimus level is 7.1 this week and near goal range.  No dose changes, but get another level when you get labs done again to recheck it.  Thanks Magaly

## 2019-06-17 LAB
% BASOPHILS (EXTERNAL): 1.1 % (ref 0–1)
% EOSINOPHILS (EXTERNAL): 0.6 % (ref 0–3)
% IMMATURE GRANULOCYTES (EXTERNAL): 9.6 %
% LYMPHOCYTES (EXTERNAL): 16.1 % (ref 24–44)
% MONOCYTES (EXTERNAL): 2.8 % (ref 7–13)
% NEUTROPHILS (EXTERNAL): 59.8 % (ref 35–77)
ABSOLUTE BASOPHILS (EXTERNAL): 0 10(3)/UL (ref 0–0.1)
ABSOLUTE EOSINOPHILS (EXTERNAL): 0 10(3)/UL (ref 0–0.3)
ABSOLUTE IMMATURE GRANULOCYTES (EXTERNAL): 0.34 10(3)/UL
ABSOLUTE LYMPHOCYTES (EXTERNAL): 0.6 10(3)/UL (ref 1.1–5)
ABSOLUTE MONOCYTES (EXTERNAL): 0.1 10(3)/UL (ref 0.1–0.7)
ABSOLUTE NEUTROPHILS (EXTERNAL): 2.5 10(3)/UL (ref 1.5–8.5)
ANION GAP SERPL CALC-SCNC: 15.2 MMOL/L
BUN SERPL-MCNC: 27 MG/DL (ref 7–26)
BUN/CREATININE RATIO (EXTERNAL): 19.29 RATIO
CALCIUM (EXTERNAL): 10.3 MG/DL (ref 8.4–10.2)
CHLORIDE (EXTERNAL): 104 MMOL/L (ref 98–107)
CMV DNA QUANT (EXTERNAL): 56 IU/ML
CO2 (EXTERNAL): 23 MMOL/L (ref 20–31)
CREATININE (EXTERNAL): 1.4 MG/DL (ref 0.55–1.02)
ERYTHROCYTE [DISTWIDTH] IN BLOOD BY AUTOMATED COUNT: 15.6 % (ref 11.5–13.1)
GFR ESTIMATED (EXTERNAL): 42 ML/MIN/1.73M2
GLUCOSE (EXTERNAL): 128 MG/DL (ref 70–105)
HEMATOCRIT (EXTERNAL): 31.5 % (ref 33–51)
HEMOGLOBIN (EXTERNAL): 9.7 G/DL (ref 12–16)
LOG IU/ML OF CMVQNT (EXTERNAL): 1.75 LOG IU/ML
MCH RBC QN AUTO: 31.5 PG (ref 26–34)
MCHC RBC AUTO-ENTMCNC: 30.8 G/DL (ref 32–36)
MCV RBC AUTO: 102.3 FL (ref 80–100)
PLATELET COUNT (EXTERNAL): 292 10(3)/UL (ref 150–450)
POTASSIUM (EXTERNAL): 4.2 MMOL/L (ref 3.5–5.1)
RBC # BLD AUTO: 3.08 10(6)/UL (ref 4–5.2)
SODIUM (EXTERNAL): 138 MMOL/L (ref 136–145)
WBC COUNT (EXTERNAL): 3.5 10(3)/UL (ref 4.5–11)

## 2019-06-18 ENCOUNTER — TRANSFERRED RECORDS (OUTPATIENT)
Dept: HEALTH INFORMATION MANAGEMENT | Facility: CLINIC | Age: 59
End: 2019-06-18

## 2019-06-18 ENCOUNTER — EXTERNAL ORDER RESULTS (OUTPATIENT)
Dept: PULMONOLOGY | Facility: CLINIC | Age: 59
End: 2019-06-18

## 2019-06-18 DIAGNOSIS — Z94.2 LUNG REPLACED BY TRANSPLANT (H): ICD-10-CM

## 2019-06-18 LAB
% IMMATURE GRANULOCYTES: 7.6
ABS LYMPHOCYTES: 0.6 CELLS/MM3
ABS NEUTROPHILS: 3.2 CELLS/MM3
BASOPHILS NFR BLD AUTO: 0.7 %
EOSINOPHIL NFR BLD AUTO: 0.5 %
ERYTHROCYTE [DISTWIDTH] IN BLOOD BY AUTOMATED COUNT: 15.1 %
HCT VFR BLD AUTO: 31.8 %
HEMOGLOBIN: 9.8 G/DL (ref 11.7–15.7)
LYMPHOCYTES NFR BLD AUTO: 14.2 %
MCH RBC QN AUTO: 31.2 PG
MCHC RBC AUTO-ENTMCNC: 30.8 G/DL
MCV RBC AUTO: 101.3 FL
MONOCYTES NFR BLD AUTO: 2.4 %
NEUTROPHILS NFR BLD AUTO: 74.6 %
PLATELET # BLD AUTO: 259 K/UL
PMV BLD: 10.3 FL
RBC # BLD AUTO: 3.14 M/UL
WBC # BLD AUTO: 4.2 10^9/L

## 2019-06-19 ENCOUNTER — TELEPHONE (OUTPATIENT)
Dept: TRANSPLANT | Facility: CLINIC | Age: 59
End: 2019-06-19

## 2019-06-19 DIAGNOSIS — E83.42 HYPOMAGNESEMIA: ICD-10-CM

## 2019-06-19 LAB
TACROLIMUS BLD-MCNC: 8.8 UG/L (ref 5–15)
TME LAST DOSE: NORMAL H

## 2019-06-19 RX ORDER — MAGNESIUM OXIDE 400 MG/1
400 TABLET ORAL 2 TIMES DAILY
Qty: 180 TABLET | Refills: 3 | Status: ON HOLD | OUTPATIENT
Start: 2019-06-19 | End: 2019-09-12

## 2019-06-19 NOTE — TELEPHONE ENCOUNTER
Magnesium 1.4 on 6/18/19. Per Ame, increase mag oxide to 400 mg BID. Recheck next week as usual with weekly labs. Notified patient.

## 2019-06-19 NOTE — RESULT ENCOUNTER NOTE
CMV slightly positive, 56, on 6/12/19. Ame aware, to continue weekly monitoring. Awaiting to hear from Dr. Patel with ID. Notified patient.

## 2019-06-24 DIAGNOSIS — Z94.2 LUNG TRANSPLANT RECIPIENT (H): ICD-10-CM

## 2019-06-24 DIAGNOSIS — J84.9 ILD (INTERSTITIAL LUNG DISEASE) (H): ICD-10-CM

## 2019-06-24 DIAGNOSIS — K21.9 GASTROESOPHAGEAL REFLUX DISEASE WITHOUT ESOPHAGITIS: ICD-10-CM

## 2019-06-24 RX ORDER — PANTOPRAZOLE SODIUM 40 MG/1
40 TABLET, DELAYED RELEASE ORAL 2 TIMES DAILY
Qty: 60 TABLET | Refills: 11 | Status: SHIPPED | OUTPATIENT
Start: 2019-06-24 | End: 2020-07-15

## 2019-06-25 DIAGNOSIS — Z94.2 LUNG REPLACED BY TRANSPLANT (H): ICD-10-CM

## 2019-06-25 PROCEDURE — 80197 ASSAY OF TACROLIMUS: CPT | Performed by: INTERNAL MEDICINE

## 2019-06-26 LAB
TACROLIMUS BLD-MCNC: 9.1 UG/L (ref 5–15)
TME LAST DOSE: NORMAL H

## 2019-07-02 DIAGNOSIS — Z94.2 LUNG REPLACED BY TRANSPLANT (H): ICD-10-CM

## 2019-07-02 PROCEDURE — 80197 ASSAY OF TACROLIMUS: CPT | Performed by: INTERNAL MEDICINE

## 2019-07-05 LAB
TACROLIMUS BLD-MCNC: 8.6 UG/L (ref 5–15)
TME LAST DOSE: NORMAL H

## 2019-07-08 DIAGNOSIS — Z94.2 LUNG REPLACED BY TRANSPLANT (H): ICD-10-CM

## 2019-07-08 DIAGNOSIS — I48.0 PAROXYSMAL ATRIAL FIBRILLATION (H): ICD-10-CM

## 2019-07-08 RX ORDER — PREDNISONE 2.5 MG/1
TABLET ORAL
Qty: 90 TABLET | Refills: 11 | Status: SHIPPED | OUTPATIENT
Start: 2019-07-08 | End: 2020-02-19

## 2019-07-08 RX ORDER — METOPROLOL TARTRATE 25 MG/1
50 TABLET, FILM COATED ORAL 2 TIMES DAILY
Qty: 60 TABLET | Refills: 11 | Status: ON HOLD | OUTPATIENT
Start: 2019-07-08 | End: 2019-10-23

## 2019-07-08 NOTE — RESULT ENCOUNTER NOTE
Armand Mcdonnell, your tacrolimus level was 8.6 at 12 hours on 7/2/19 which is within your goal range of 8-10. No dose change at this time. Please call the transplant office ([phone]) with any questions. Thanks, Mikayla

## 2019-07-10 ENCOUNTER — TRANSFERRED RECORDS (OUTPATIENT)
Dept: HEALTH INFORMATION MANAGEMENT | Facility: CLINIC | Age: 59
End: 2019-07-10

## 2019-07-10 DIAGNOSIS — Z94.2 LUNG REPLACED BY TRANSPLANT (H): ICD-10-CM

## 2019-07-10 PROCEDURE — 80197 ASSAY OF TACROLIMUS: CPT | Performed by: INTERNAL MEDICINE

## 2019-07-11 LAB
TACROLIMUS BLD-MCNC: 8.9 UG/L (ref 5–15)
TME LAST DOSE: NORMAL H

## 2019-07-11 NOTE — RESULT ENCOUNTER NOTE
Tacrolimus level 8.9 at 12 hours, on 7/10/19  Goal 8-10.   Current dose 3 mg in AM, 3.5 mg in PM  No dose change. At goal.     [employer] message sent

## 2019-07-12 LAB — Lab: <35 IU/ML

## 2019-07-15 ENCOUNTER — TELEPHONE (OUTPATIENT)
Dept: TRANSPLANT | Facility: CLINIC | Age: 59
End: 2019-07-15

## 2019-07-15 NOTE — TELEPHONE ENCOUNTER
Magnesium 1.3 on 7/10/19. Per Ame, increase mag oxide to 800 mg BID. LM with patient. Requested call back to confirm.

## 2019-07-16 DIAGNOSIS — Z94.2 LUNG REPLACED BY TRANSPLANT (H): ICD-10-CM

## 2019-07-16 PROCEDURE — 80197 ASSAY OF TACROLIMUS: CPT | Performed by: INTERNAL MEDICINE

## 2019-07-17 LAB
TACROLIMUS BLD-MCNC: 7 UG/L (ref 5–15)
TME LAST DOSE: NORMAL H

## 2019-07-18 NOTE — RESULT ENCOUNTER NOTE
Armand Mcdonnell, your tacrolimus level was 7 at 12 hours on 7/16/19. You are close to your goal of 8-10. No change for now, please get another level next week. Please call the transplant office ([phone]) with any questions. Thanks, Mikayla

## 2019-07-23 DIAGNOSIS — Z94.2 LUNG REPLACED BY TRANSPLANT (H): ICD-10-CM

## 2019-07-23 PROCEDURE — 80197 ASSAY OF TACROLIMUS: CPT | Performed by: INTERNAL MEDICINE

## 2019-07-25 ENCOUNTER — DOCUMENTATION ONLY (OUTPATIENT)
Dept: INFECTIOUS DISEASES | Facility: CLINIC | Age: 59
End: 2019-07-25

## 2019-07-25 ENCOUNTER — TELEPHONE (OUTPATIENT)
Dept: TRANSPLANT | Facility: CLINIC | Age: 59
End: 2019-07-25

## 2019-07-25 LAB
TACROLIMUS BLD-MCNC: 9 UG/L (ref 5–15)
TME LAST DOSE: NORMAL H

## 2019-07-25 NOTE — PROGRESS NOTES
Transplant Infectious Disease MD Documentation Note:    Ms. Blue is immunosuppressed s/p a bilateral lung transplant on 3/1/18 for ILD.  She was last seen in Transplant ID Clinic on 3/6/19 by Dr. Grant (who has since left [facility]).  She has had three CMV viremia activations since her transplant, in 7/18, 12/18, and 5/19.  She has remained on maintenance Valcyte 450 mg PO BID for a prolonged period but has now has weekly plasma CMV PCR viral loads < 35 international unit(s)/ml (at an outside lab) consistently since 6/18/19 (most recently on 7/23/19).  Therefore, another attempt at discontinuing the antiviral therapy at this time and seeing if she can maintain control of her CMV on her own is now warranted.  We will ask for continued weekly plasma CMV PCR viral load assays for the next month, followed by every other week for an additional two months, to monitor for any recurrence of the viremia.  Plans discussed with her Transplant Coordinator.    Gurdeep Patel MD

## 2019-07-25 NOTE — TELEPHONE ENCOUNTER
Per Dr. Patel, okay to stop Valcyte. CMV PCR viral loads weekly for a month, then every other week for another two months.   Creat this week 1.7. Patient thinks she's getting in 60 oz of fluid at least per day. She will add an extra glass of fluids per day and aim for 70 oz of fluids per day. Recheck next week.  Reports she was having a pain in her L side of abdomen, was seen by local provider on 7/23/19. No diarrhea, normal BMs. Per Care Everywhere note, that provider thought it was muscular pain and recommended supportive care. Patient reports pain is better today. Will continue to monitor.

## 2019-07-25 NOTE — RESULT ENCOUNTER NOTE
Armand Mcdonnell, your tacrolimus level was 9 at 12 hours on 7/23/19 which is within your goal range of 8-10. No dose change at this time. Please call the transplant office ([phone]) with any questions. Thanks, Mikayla

## 2019-07-30 DIAGNOSIS — Z94.2 LUNG REPLACED BY TRANSPLANT (H): ICD-10-CM

## 2019-07-30 DIAGNOSIS — I48.0 PAROXYSMAL ATRIAL FIBRILLATION (H): ICD-10-CM

## 2019-07-30 DIAGNOSIS — R11.2 INTRACTABLE VOMITING WITH NAUSEA, UNSPECIFIED VOMITING TYPE: ICD-10-CM

## 2019-07-30 PROCEDURE — 80197 ASSAY OF TACROLIMUS: CPT | Performed by: PHYSICIAN ASSISTANT

## 2019-07-31 LAB
TACROLIMUS BLD-MCNC: 8.9 UG/L (ref 5–15)
TME LAST DOSE: NORMAL H

## 2019-08-01 NOTE — RESULT ENCOUNTER NOTE
Tacrolimus level 8.9 at 12 hours, on 7/30/19  Goal 8-10.   Current dose 3 mg in AM, 3.5 mg in PM    No dose change. At goal.     [employer] message sent

## 2019-08-06 NOTE — PROGRESS NOTES
[facility]  Pulmonary Medicine/Lung Transplant  August 7, 2019       Today's visit note:       ASSESSMENT/PLAN:    Mrs. Kecia Blue is a 56 y/o female with h/o dermatomyositis, seronegative RA, ILD and pulmonary hypertension s/p bilateral lung transplant on 3/1/18. Post operative course complicated by right main bronchial stenosis, positive DSAs, CMV viremia, C difficile and CMV viremia. She is seen today for scheduled follow up.      Coordinator/MD: AMANDA/JONO     # S/p bilateral lung transplant:  - Continue tacrolimus (goal 8-10) and prednisone  - Bactrim prophylaxis    # Right main bronchial stenosis s/p dilatation: most recently on 3/7/19. No stent currently in place. Scheduled for bronch tomorrow     # Recipient of [facility] increased risk organ donor  - Plan 12 month high risk labs    # H/o CMV viremia: currently not on valcyte. Most recent CMV PCR was 48 copies (normal <35) on 7/30/19-->continue checking PCR every other week.      #. Leukopenia: with WBC of 4.8 today. Improved from prior.  - Imuran remains on hold but plan to restart in approx 1 month if CMV remains negative     # CKD: Cr increased today-->will see Dr. Gamble in Nephrology clinic today     # HTN: Increased metoprolol to 50 mg BID at last visit but still high-->Dr. Gamble to [facility].     # Bilateral knee pain: with bilateral effusions, L>R. - WIll f/u with [facility] in [place] later this week    # Healthcare maintenance    --Influenza and other vaccinations: gave second dose at 6/5/19 visit    --Annual dermatology visit: ???    RTC: pending bronch results    Please also refer to RN Transplant Coordinator note for additional information related to this visit.  PATIENT PROFILE AND TRANSPLANT HISTORY:  Current age:                    58 year old  Underlying lung disease: IIP: Nonspecific Interstitial Pneumonia    Transplant date(s):        3/1/2018 (Lung)  Transplant POD(s):        523  Lung transplant  type(s): Bilateral sequential lung      Transplant coordinator:   Mikayla Latham  Transplant provider(s):        Ame Chow    Active Patient Thresholds     Lab Low High Effective Since Comment    Tacrolimus Level 8 10 06/06/2019 6/6/19 CP          INTERVAL HISTORY:  --Most recent resulted PRA: 6/5/19, neg for DSA    --Most recent lung transplant clinic visit: 6/5/19 (Claudine)    --Interval clinic visits, test results, signif medical events (obtained by chart review and patient hx):    6/6/19: Bronch, balloon bronchoplasty    7/25/19: Valcyte d/c'd-->frequent CMV PCR      --Today:    Ms. Blue return to clinic today for previously scheduled appointment; she was accompanied by her , Ranjan.  The patient reports that her breathing has been very good since her June 5 visit.  She is limited more by her knee discomfort done by shortness of breath.  She is not coughing up significant amounts of sputum and does not have hemoptysis, chest pain, wheezing.    REVIEW OF SYSTEMS:    #She is not having nausea or vomiting.  Appetite is good and weight has been stable    #Some diarrhea but not severe    #She continues to have bilateral knee pain and swelling, left greater than right    #Complete review of systems was asked and was otherwise negative.    PHYSICAL EXAM:  Vitals:    08/07/19 1156 08/07/19 1157   BP: (!) 176/100 (!) 163/94   BP Location: Right arm    Pulse: 72    Temp: 98.1  F (36.7  C)    SpO2: 99%    Weight: 54.4 kg (119 lb 14.4 oz)      Constitutional:    Awake, alert and in no apparent distress   Eyes:    Anicteric, PERRL   ENT:    oral mucosa moist without lesions    Neck:    Supple without supraclavicular or cervical lymphadenopathy    Lungs:    Good air entry both lungs. No crackles. No rhonchi. No wheezes.    Cardiovascular:    Normal S1 and S2. No JVD, murmur, rub, chavez.   Abdomen:    NABS, soft, nontender, nondistended. No HSM.    Musculoskeletal:    No edema.    Neurologic:    Alert  and conversant.    Skin:    Warm, dry. No rash seen.          Data:     I reviewed all resulted lab tests and imaging studies.    Results for orders placed or performed during the hospital encounter of 02/22/18   BRONCHOSCOPY   Result Value Ref Range    Bronchoscopy       [facility]  Endoscopy Department-[facility]  _______________________________________________________________________________  Patient Name: Kecia Blue         Procedure Date: 3/13/2018 8:07 AM  MRN: [id number]                       Account #: [id number]  YOB: 1960             Admit Type: Outpatient  Age: 57                               Gender: Female  Note Status: Finalized                Attending MD: Gissell Sanchez MD  Pause for the cause: Pause for the cause completed Total Sedation Time: 13   minutes  _______________________________________________________________________________     Procedure:            Bronchoscopy  Indications:          Surveillance lung transplant  Providers:            Gissell Sanchez MD, Juana Baugh (Fellow), Killian Everett RN, Tatiana Starr, Technician  Referring MD:         Alessandra Lombardi  Medicines:            Midazolam 1.5 mg IV, Fentanyl 75 mcg IV, Lidocaine 4%                          Nebulizer 2.5 mL, Lidocaine 4% applied to cords 9 mL,                         Lidocaine 1% subglottic space 9 mL  Requesting Physician:   Complications:        No immediate complications  _______________________________________________________________________________  Procedure:            After obtaining informed consent, the Bronchoscope was                         introduced through the mouth and advanced to the                         tracheobronchial tree. The procedure was accomplished                         without difficulty. The patient tolerated the procedure                         well. The total duration of the procedure was 13                          minutes.  Findings:       Anastomoses Examination: The surgical anastomoses in both the right and        left mainstem bronchi are intact and patent. Thick yellow secretions        around right anastamosis present. Remainder of tracheobronchial tree        normal. Minimal thin clear secretions throughout .  Impression:           - Surveillance lung transplant                        - No specimens collected.                        - The examination was normal.  Moderate Sedation:       Moderate (conscious) sedation was administered by the endoscopy nurse        and supervised by the endoscopist. The following parameters were        monitored: oxygen saturation, heart rate, blood pressure, and response        to care. Total physician intraservice time was 13 minutes.  Recommendation:       - Follow up with referring physician.  Attending Participation:       I was present and participated during the entire procedure, including        non-key portions.      __________________  Gissell Sanchez MD  3/13/2018 10:18:57 AM  I was physically present for the entire viewing portion of the exam.  __________________________  Signature of teaching physician  B4lisa/Elina Sanchez MD  Number of Addenda: 0    Note Initiated On: 3/13/2018 8:07 AM         PULMONARY FUNCTION TEST INTERPRETATION:  Dated 8/7/2019 were reviewed and interpreted by me.Pulmonary function tests spirometry results are consistent with a restrictive pulmonary function abnormality.  When compared with this patient's most recent previous test, dated 6/5/2019, there have been no significant changes.    STUDIES STILL PENDING AT THE TIME OF THIS NOTE:  Unresulted Labs Ordered in the Past 30 Days of this Admission     Date and Time Order Name Status Description    8/7/2019 1442 BK VIRUS PCR QUANTITATIVE In process     8/7/2019 0942 PRA DONOR SPECIFIC ANTIBODY In process           Complexity indicators:    --immune compromised, on high-risk  medications x   --organ transplant recipient x   --multiple organ transplant recipient    --active respiratory infection    --within one year of transplant; and/or within one month of hospitalization    --chronic lung allograft dysfunction syndrome (CLAD, chronic rejection, or bronchiolitis obliterans syndrome)    --new medical problem addressed during this visit    --multiple active medical problems x   --admitted directly to hospital from this clinic visit    -->50% of this visit was spent in counseling and care coordination. If yes, total visit time was              Medications:     Current Outpatient Medications   Medication     ACETAMINOPHEN PO     calcium-vitamin D (CALTRATE) 600-400 MG-UNIT per tablet     ferrous sulfate (FEROSUL) 325 (65 Fe) MG tablet     magnesium oxide (MAG-OX) 400 MG tablet     metoprolol tartrate (LOPRESSOR) 25 MG tablet     multivitamin, therapeutic with minerals (THERA-VIT-M) TABS tablet     ondansetron (ZOFRAN) 4 MG tablet     order for DME     order for DME     pantoprazole (PROTONIX) 40 MG EC tablet     predniSONE (DELTASONE) 2.5 MG tablet     sulfamethoxazole-trimethoprim (BACTRIM/SEPTRA) 400-80 MG tablet     tacrolimus (GENERIC EQUIVALENT) 0.5 MG capsule     tacrolimus (GENERIC EQUIVALENT) 1 MG capsule     No current facility-administered medications for this visit.             Past Medical and Surgical History:     Past Medical History:   Diagnosis Date     Antisynthetase syndrome (H) 2014     Chronic cough      Chronic infection     recent C diff  8/18     Dehydration 8/1/2018     Dermatomyositis (H)      Dysplasia of cervix, low grade (ESTRADA 1)      ILD (interstitial lung disease) (H)      Osteopenia      PONV (postoperative nausea and vomiting)      Pulmonary hypertension (H)      Raynaud's disease      Seronegative rheumatoid arthritis (H)      Past Surgical History:   Procedure Laterality Date     BRONCHOSCOPY (RIGID OR FLEXIBLE), DIAGNOSTIC N/A 4/10/2018    Procedure:  COMBINED BRONCHOSCOPY (RIGID OR FLEXIBLE), LAVAGE;;  Surgeon: Mariposa Donohue MD;  Location: [facility]     BRONCHOSCOPY (RIGID OR FLEXIBLE), DILATE BRONCHUS / TRACHEA N/A 10/11/2018    Procedure: BRONCHOSCOPY (RIGID OR FLEXIBLE), DILATE BRONCHUS / TRACHEA;  Flexible And Rigid Bronchoscopy and Dilation;  Surgeon: Wilber Lin MD;  Location: [facility]     BRONCHOSCOPY FLEXIBLE N/A 3/13/2018    Procedure: BRONCHOSCOPY FLEXIBLE;  Flexible Bronchoscopy ;  Surgeon: Gissell Sanchez MD;  Location: [facility]     BRONCHOSCOPY FLEXIBLE N/A 5/9/2018    Procedure: BRONCHOSCOPY FLEXIBLE;;  Surgeon: Wilber Lin MD;  Location: [facility]     BRONCHOSCOPY FLEXIBLE AND RIGID N/A 9/10/2018    Procedure: BRONCHOSCOPY FLEXIBLE AND RIGID;  Flexible and Rigid Bronchoscopy with Balloon Dilation, tissue debulking with cryo, and Right mainstem bronchus stent placement;  Surgeon: Wilber Lin MD;  [facility]: [facility]     BRONCHOSCOPY RIGID N/A 6/6/2018    Procedure: BRONCHOSCOPY RIGID;;  Surgeon: Lopez Macias MD;  Location: [facility]     BRONCHOSCOPY, DILATE BRONCHUS, STENT BRONCHUS, COMBINED N/A 6/11/2018    Procedure: COMBINED BRONCHOSCOPY, DILATE BRONCHUS, STENT BRONCHUS;  Flexible Bronchoscopy, Balloon Dilation, Bronchial Washings;  Surgeon: Wilber Lin MD;  [facility]: [facility]     BRONCHOSCOPY, DILATE BRONCHUS, STENT BRONCHUS, COMBINED Right 7/10/2018    Procedure: COMBINED BRONCHOSCOPY, DILATE BRONCHUS, STENT BRONCHUS;  Flexible Bronchoscopy, Balloon Dilation, Bronchial Washings  ;  Surgeon: Wilber Lin MD;  [facility]     BRONCHOSCOPY, DILATE BRONCHUS, STENT BRONCHUS, COMBINED N/A 8/2/2018    Procedure: COMBINED BRONCHOSCOPY, DILATE BRONCHUS, STENT BRONCHUS;  Flexible Bronchoscopy, Bronchial Washings, Balloon Dilation;  Surgeon: Wilber Lin MD;  [facility]     BRONCHOSCOPY, DILATE BRONCHUS, STENT BRONCHUS, COMBINED N/A 8/20/2018    Procedure: COMBINED BRONCHOSCOPY,  DILATE BRONCHUS, STENT BRONCHUS;  Flexible Bronchoscopy, Balloon Dilation;  Surgeon: Wilber Lin MD;  [facility]     BRONCHOSCOPY, DILATE BRONCHUS, STENT BRONCHUS, COMBINED N/A 10/29/2018    Procedure: Flexible Bronchoscopy, Balloon Dilation, Stent Revision, Airway Examination And Therapeutic Suctioning, Cyro Tumor Debulking;  Surgeon: Wilber Lin MD;  [facility]: [facility]     BRONCHOSCOPY, DILATE BRONCHUS, STENT BRONCHUS, COMBINED N/A 11/20/2018    Procedure: Rigid Bronchoscopy, Stent Removal and dilitation;  Surgeon: Wilber Lin MD;  [facility]: [facility]     BRONCHOSCOPY, DILATE BRONCHUS, STENT BRONCHUS, COMBINED N/A 12/14/2018    Procedure: Flexible And Rigid Bronchoscopy, Balloon Dilation, Bronchial Washing;  Surgeon: Wilber Lin MD;  [facility]     BRONCHOSCOPY, DILATE BRONCHUS, STENT BRONCHUS, COMBINED N/A 1/17/2019    Procedure: Flexible And Rigid Bronchoscopy, Balloon Dilation.;  Surgeon: Wilber Lin MD;  [facility]     BRONCHOSCOPY, DILATE BRONCHUS, STENT BRONCHUS, COMBINED N/A 3/7/2019    Procedure: Flexible and Rigid Bronchoscopy, Bronchial Washing, Balloon Dilation;  Surgeon: Wilber Lin MD;  [facility]     BRONCHOSCOPY, DILATE BRONCHUS, STENT BRONCHUS, COMBINED N/A 6/6/2019    Procedure: Rigid and Flexible Bronchoscopy, Balloon Dilation;  Surgeon: Wilber Lin MD;  Location: [facility]     ENT SURGERY      tonsillectomy as a child     ESOPHAGOSCOPY, GASTROSCOPY, DUODENOSCOPY (EGD), COMBINED N/A 10/29/2018    Procedure: COMBINED ESOPHAGOSCOPY, GASTROSCOPY, DUODENOSCOPY (EGD) with biopsies and polypectomy;  Surgeon: Chente Bloom MD;  [facility]     INSERT EXTRACORPORAL MEMBRANE OXYGENATOR ADULT (OUTSIDE OR) N/A 2/27/2018    Procedure: INSERT EXTRACORPORAL MEMBRANE OXYGENATOR ADULT (OUTSIDE OR);  INSERT EXTRACORPORAL MEMBRANE OXYGENATOR ADULT (OUTSIDE OR) ;  Surgeon: Toby Hernandez MD;  Location:  [facility]     no prior surgery       REMOVE EXTRACORPORAL MEMBRANE OXYGENATOR ADULT N/A 3/3/2018    Procedure: REMOVE EXTRACORPORAL MEMBRANE OXYGENATOR ADULT;  Removal of Right Femoral Venous and Right Axillary Arterial Extracorporeal Membrane Oxygenator;  Surgeon: Toby Hernandez MD;  [facility]: [facility]     TRANSPLANT LUNG RECIPIENT SINGLE X2 Bilateral 3/1/2018    Procedure: TRANSPLANT LUNG RECIPIENT SINGLE X2;  Median Sternotomy, Extracorporeal Membrane Oxygenator, bilateral sequential lung transplant;  Surgeon: Toby Hernandez MD;  [facility]           Family History:     Family History   Problem Relation Age of Onset     Hypertension Mother      Arthritis Mother      Pancreatic Cancer Father      Diabetes Father

## 2019-08-07 ENCOUNTER — OFFICE VISIT (OUTPATIENT)
Dept: TRANSPLANT | Facility: CLINIC | Age: 59
End: 2019-08-07
Attending: INTERNAL MEDICINE
Payer: COMMERCIAL

## 2019-08-07 ENCOUNTER — OFFICE VISIT (OUTPATIENT)
Dept: NEPHROLOGY | Facility: CLINIC | Age: 59
End: 2019-08-07
Attending: INTERNAL MEDICINE
Payer: COMMERCIAL

## 2019-08-07 ENCOUNTER — RESULTS ONLY (OUTPATIENT)
Dept: OTHER | Facility: CLINIC | Age: 59
End: 2019-08-07

## 2019-08-07 ENCOUNTER — ANCILLARY PROCEDURE (OUTPATIENT)
Dept: GENERAL RADIOLOGY | Facility: CLINIC | Age: 59
End: 2019-08-07
Attending: PHYSICIAN ASSISTANT
Payer: COMMERCIAL

## 2019-08-07 VITALS
HEART RATE: 72 BPM | DIASTOLIC BLOOD PRESSURE: 94 MMHG | BODY MASS INDEX: 20.58 KG/M2 | TEMPERATURE: 98.1 F | SYSTOLIC BLOOD PRESSURE: 163 MMHG | OXYGEN SATURATION: 99 % | WEIGHT: 119.9 LBS

## 2019-08-07 VITALS
BODY MASS INDEX: 20.43 KG/M2 | OXYGEN SATURATION: 99 % | WEIGHT: 119 LBS | DIASTOLIC BLOOD PRESSURE: 104 MMHG | HEART RATE: 72 BPM | TEMPERATURE: 98.1 F | SYSTOLIC BLOOD PRESSURE: 172 MMHG

## 2019-08-07 DIAGNOSIS — K59.00 CONSTIPATION, UNSPECIFIED CONSTIPATION TYPE: ICD-10-CM

## 2019-08-07 DIAGNOSIS — I48.0 PAROXYSMAL ATRIAL FIBRILLATION (H): ICD-10-CM

## 2019-08-07 DIAGNOSIS — Z94.2 LUNG REPLACED BY TRANSPLANT (H): ICD-10-CM

## 2019-08-07 DIAGNOSIS — R11.2 INTRACTABLE VOMITING WITH NAUSEA, UNSPECIFIED VOMITING TYPE: ICD-10-CM

## 2019-08-07 DIAGNOSIS — I10 ACCELERATED ESSENTIAL HYPERTENSION: ICD-10-CM

## 2019-08-07 DIAGNOSIS — R79.89 ELEVATED SERUM CREATININE: Primary | ICD-10-CM

## 2019-08-07 DIAGNOSIS — R79.89 ELEVATED SERUM CREATININE: ICD-10-CM

## 2019-08-07 DIAGNOSIS — Z94.2 LUNG REPLACED BY TRANSPLANT (H): Primary | ICD-10-CM

## 2019-08-07 LAB
ACANTHOCYTES BLD QL SMEAR: SLIGHT
ALBUMIN UR-MCNC: 100 MG/DL
ANION GAP SERPL CALCULATED.3IONS-SCNC: 6 MMOL/L (ref 3–14)
APPEARANCE UR: CLEAR
BACTERIA #/AREA URNS HPF: ABNORMAL /HPF
BASOPHILS # BLD AUTO: 0 10E9/L (ref 0–0.2)
BASOPHILS NFR BLD AUTO: 0 %
BILIRUB UR QL STRIP: NEGATIVE
BUN SERPL-MCNC: 27 MG/DL (ref 7–30)
CALCIUM SERPL-MCNC: 9.8 MG/DL (ref 8.5–10.1)
CHLORIDE SERPL-SCNC: 98 MMOL/L (ref 94–109)
CK SERPL-CCNC: 36 U/L (ref 30–225)
CO2 SERPL-SCNC: 29 MMOL/L (ref 20–32)
COLOR UR AUTO: YELLOW
CREAT SERPL-MCNC: 1.8 MG/DL (ref 0.52–1.04)
CREAT UR-MCNC: 100 MG/DL
DIFFERENTIAL METHOD BLD: ABNORMAL
EOSINOPHIL # BLD AUTO: 0 10E9/L (ref 0–0.7)
EOSINOPHIL NFR BLD AUTO: 0 %
ERYTHROCYTE [DISTWIDTH] IN BLOOD BY AUTOMATED COUNT: 14.4 % (ref 10–15)
GFR SERPL CREATININE-BSD FRML MDRD: 31 ML/MIN/{1.73_M2}
GLUCOSE SERPL-MCNC: 119 MG/DL (ref 70–99)
GLUCOSE UR STRIP-MCNC: NEGATIVE MG/DL
HCT VFR BLD AUTO: 32.3 % (ref 35–47)
HGB BLD-MCNC: 9.9 G/DL (ref 11.7–15.7)
HGB UR QL STRIP: NEGATIVE
INR PPP: 0.91 (ref 0.86–1.14)
KETONES UR STRIP-MCNC: NEGATIVE MG/DL
LEUKOCYTE ESTERASE UR QL STRIP: ABNORMAL
LYMPHOCYTES # BLD AUTO: 0.9 10E9/L (ref 0.8–5.3)
LYMPHOCYTES NFR BLD AUTO: 18 %
MAGNESIUM SERPL-MCNC: 2.2 MG/DL (ref 1.6–2.3)
MCH RBC QN AUTO: 29.7 PG (ref 26.5–33)
MCHC RBC AUTO-ENTMCNC: 30.7 G/DL (ref 31.5–36.5)
MCV RBC AUTO: 97 FL (ref 78–100)
MONOCYTES # BLD AUTO: 0.6 10E9/L (ref 0–1.3)
MONOCYTES NFR BLD AUTO: 13 %
MUCOUS THREADS #/AREA URNS LPF: PRESENT /LPF
NEUTROPHILS # BLD AUTO: 3.3 10E9/L (ref 1.6–8.3)
NEUTROPHILS NFR BLD AUTO: 69 %
NITRATE UR QL: NEGATIVE
PH UR STRIP: 7 PH (ref 5–7)
PLATELET # BLD AUTO: 194 10E9/L (ref 150–450)
PLATELET # BLD EST: ABNORMAL 10*3/UL
POIKILOCYTOSIS BLD QL SMEAR: SLIGHT
POTASSIUM SERPL-SCNC: 4.2 MMOL/L (ref 3.4–5.3)
PROT UR-MCNC: 2.47 G/L
PROT/CREAT 24H UR: 2.47 G/G CR (ref 0–0.2)
RBC # BLD AUTO: 3.33 10E12/L (ref 3.8–5.2)
RBC #/AREA URNS AUTO: 10 /HPF (ref 0–2)
SODIUM SERPL-SCNC: 133 MMOL/L (ref 133–144)
SOURCE: ABNORMAL
SP GR UR STRIP: 1.02 (ref 1–1.03)
SQUAMOUS #/AREA URNS AUTO: 2 /HPF (ref 0–1)
TACROLIMUS BLD-MCNC: 12.2 UG/L (ref 5–15)
TME LAST DOSE: NORMAL H
UROBILINOGEN UR STRIP-MCNC: 0 MG/DL (ref 0–2)
WBC # BLD AUTO: 4.8 10E9/L (ref 4–11)
WBC #/AREA URNS AUTO: 19 /HPF (ref 0–5)

## 2019-08-07 PROCEDURE — 87799 DETECT AGENT NOS DNA QUANT: CPT | Performed by: INTERNAL MEDICINE

## 2019-08-07 PROCEDURE — 81001 URINALYSIS AUTO W/SCOPE: CPT | Performed by: INTERNAL MEDICINE

## 2019-08-07 PROCEDURE — G0463 HOSPITAL OUTPT CLINIC VISIT: HCPCS | Mod: ZF

## 2019-08-07 PROCEDURE — 80197 ASSAY OF TACROLIMUS: CPT

## 2019-08-07 PROCEDURE — 85025 COMPLETE CBC W/AUTO DIFF WBC: CPT

## 2019-08-07 PROCEDURE — 84156 ASSAY OF PROTEIN URINE: CPT | Performed by: INTERNAL MEDICINE

## 2019-08-07 PROCEDURE — 82550 ASSAY OF CK (CPK): CPT | Performed by: INTERNAL MEDICINE

## 2019-08-07 RX ORDER — AMLODIPINE BESYLATE 10 MG/1
10 TABLET ORAL DAILY
Qty: 30 TABLET | Refills: 11 | Status: SHIPPED | OUTPATIENT
Start: 2019-08-07 | End: 2019-09-03

## 2019-08-07 ASSESSMENT — PAIN SCALES - GENERAL
PAINLEVEL: MILD PAIN (2)
PAINLEVEL: NO PAIN (0)

## 2019-08-07 NOTE — PROGRESS NOTES
Nephrology Clinic    Yenni Gamble MD  2019     Name: Kecia Blue  MRN: [id number]  Age: 58 year old  : 1960  Referring provider: Srinivas Mcelroy     Assessment and Plan:    # Elevated Creatinine:   - Has had elevated Creatinine of 1.2-1.4 since 2018, Creatinine today 1.8  - Elevated Creatinine may be hemodynamically related to Tacrolimus  - Will obtain a UA and Protein to Creatinine ratio for further evaluation    # Interstitial lung disease status-post bilateral lung tx:   - CXR after transplant demonstrated stable transplant with small left effusion  - DSA 19 negative  - Continue on Tacrolimus (goal 8-10) and Prednisone  - Bactrim prophylaxis     # Pulmonary hypertension: Poorly controlled, 172/104 today  - Currently on Metoprolol  - Start Amlodipine 5 mg daily    # 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     Reason For Visit:   Elevated Creatinine    HPI:   Kecia Blue is a 58 year old female with a history of pulmonary hypertension, interstitial lung disease status-post bilateral lung transplant and dermatomyositis with sclerodermal features. The patient had a bilateral lung transplant in 2018. Since then, she has been on immunosuppression medications Tacrolimus and Prednisone, as well as Bactrim prophylaxis. The patient was previously on CellCept but was switched to Imuran in 2018, however this is currently on hold secondary to leukopenia. Her initial baseline Creatinine was 0.8-0.9, however this was consistently elevated to 1.2-1. Since 2018, her Creatinine has been consistently elevated to 1.2-1.4; however,  "today it is 1.8, prompting her arrival to the nephrology clinic today.    Today, the patient presents feeling okay. She states she has had increased muscle aches since the Imuran was stopped. She states she \"hasn't quite felt like myself.\" She reports elevated blood pressures at home of ~150/100 on average. She reports a recent episode of abdominal pain and constipation, however this was resolved with Miralax. She denies NSAID use. She also denies nausea, vomiting and diarrhea.     Review of Systems:   Pertinent items are noted in HPI or as below, remainder of complete ROS is negative.      Active Medications:     Current Outpatient Medications:      ACETAMINOPHEN PO, Take 1,000 mg by mouth every 6 hours as needed for pain, Disp: , Rfl:      amLODIPine (NORVASC) 10 MG tablet, Take 1 tablet (10 mg) by mouth daily, Disp: 30 tablet, Rfl: 11     calcium-vitamin D (CALTRATE) 600-400 MG-UNIT per tablet, Take 1 tablet by mouth daily, Disp: , Rfl:      dronabinol (MARINOL) 5 MG capsule, Take 1 capsule (5 mg) by mouth 2 times daily (before meals), Disp: 60 capsule, Rfl: 0     ferrous sulfate (FEROSUL) 325 (65 Fe) MG tablet, Take 1 tablet (325 mg) by mouth daily (with breakfast), Disp: 60 tablet, Rfl: 2     magnesium oxide (MAG-OX) 400 MG tablet, Take 1 tablet (400 mg) by mouth 2 times daily, Disp: 180 tablet, Rfl: 3     metoprolol tartrate (LOPRESSOR) 25 MG tablet, Take 2 tablets (50 mg) by mouth 2 times daily, Disp: 60 tablet, Rfl: 11     multivitamin, therapeutic with minerals (THERA-VIT-M) TABS tablet, Take 1 tablet by mouth daily, Disp: 30 each, Rfl: 11     ondansetron (ZOFRAN) 4 MG tablet, Take 1 tablet (4 mg) by mouth every 12 hours as needed for nausea, Disp: 60 tablet, Rfl: 0     order for DME, Equipment being ordered: Nebulizer, Disp: 1 Device, Rfl: 1     order for DME, Equipment being ordered: Nebulizer, Disp: 1 Device, Rfl: 1     pantoprazole (PROTONIX) 40 MG EC tablet, Take 1 tablet (40 mg) by mouth 2 times " daily, Disp: 60 tablet, Rfl: 11     predniSONE (DELTASONE) 2.5 MG tablet, Take two tablets (5mg) every AM and one tablet (2.5mg) every evening, Disp: 90 tablet, Rfl: 11     sulfamethoxazole-trimethoprim (BACTRIM/SEPTRA) 400-80 MG tablet, Take 1 tablet by mouth daily, Disp: 90 tablet, Rfl: 3     tacrolimus (GENERIC EQUIVALENT) 0.5 MG capsule, Take 1 capsule (0.5 mg) by mouth every evening Total dose is 3mg in am and 3.5mg in pm., Disp: 30 capsule, Rfl: 11     tacrolimus (GENERIC EQUIVALENT) 1 MG capsule, Total dose is 3mg in morning and 3.5mg in pm, Disp: 540 capsule, Rfl: 3     Allergies:   Patient has no known allergies.      Past Medical History:  Past Medical History:   Diagnosis Date     Antisynthetase syndrome (H) 2014     Chronic cough      Chronic infection     recent C diff  8/18     Dehydration 8/1/2018     Dermatomyositis (H)      Dysplasia of cervix, low grade (ESTRADA 1)      ILD (interstitial lung disease) (H)      Osteopenia      PONV (postoperative nausea and vomiting)      Pulmonary hypertension (H)      Raynaud's disease      Seronegative rheumatoid arthritis (H)      Patient Active Problem List   Diagnosis     Acute on chronic respiratory failure with hypoxia (H)     ILD (interstitial lung disease) (H)     Lung transplant recipient (H)     Steroid-induced hyperglycemia     Deep vein thrombosis (DVT) (H) [I82.409]     Encounter for long-term (current) use of high-risk medication     Dehydration     Acute kidney injury (H)     CMV (cytomegalovirus) (H)     Nausea and vomiting     Low hemoglobin        Past Surgical History:  Past Surgical History:   Procedure Laterality Date     BRONCHOSCOPY (RIGID OR FLEXIBLE), DIAGNOSTIC N/A 4/10/2018    Procedure: COMBINED BRONCHOSCOPY (RIGID OR FLEXIBLE), LAVAGE;;  Surgeon: Mariposa Donohue MD;  Location:  GI     BRONCHOSCOPY (RIGID OR FLEXIBLE), DILATE BRONCHUS / TRACHEA N/A 10/11/2018    Procedure: BRONCHOSCOPY (RIGID OR FLEXIBLE), DILATE BRONCHUS / TRACHEA;   Flexible And Rigid Bronchoscopy and Dilation;  Surgeon: Wilber Lin MD;  Location: [facility]     BRONCHOSCOPY FLEXIBLE N/A 3/13/2018    Procedure: BRONCHOSCOPY FLEXIBLE;  Flexible Bronchoscopy ;  Surgeon: Gissell Sanchez MD;  Location: [facility]     BRONCHOSCOPY FLEXIBLE N/A 5/9/2018    Procedure: BRONCHOSCOPY FLEXIBLE;;  Surgeon: Wilber Lin MD;  Location: [facility]     BRONCHOSCOPY FLEXIBLE AND RIGID N/A 9/10/2018    Procedure: BRONCHOSCOPY FLEXIBLE AND RIGID;  Flexible and Rigid Bronchoscopy with Balloon Dilation, tissue debulking with cryo, and Right mainstem bronchus stent placement;  Surgeon: Wilber Lin MD;  Location: [facility]     BRONCHOSCOPY RIGID N/A 6/6/2018    Procedure: BRONCHOSCOPY RIGID;;  Surgeon: Lopez Macias MD;  Location: [facility]     BRONCHOSCOPY, DILATE BRONCHUS, STENT BRONCHUS, COMBINED N/A 6/11/2018    Procedure: COMBINED BRONCHOSCOPY, DILATE BRONCHUS, STENT BRONCHUS;  Flexible Bronchoscopy, Balloon Dilation, Bronchial Washings;  Surgeon: Wilber Lin MD;  [facility]     BRONCHOSCOPY, DILATE BRONCHUS, STENT BRONCHUS, COMBINED Right 7/10/2018    Procedure: COMBINED BRONCHOSCOPY, DILATE BRONCHUS, STENT BRONCHUS;  Flexible Bronchoscopy, Balloon Dilation, Bronchial Washings  ;  Surgeon: Wilber Lin MD;  [facility]     BRONCHOSCOPY, DILATE BRONCHUS, STENT BRONCHUS, COMBINED N/A 8/2/2018    Procedure: COMBINED BRONCHOSCOPY, DILATE BRONCHUS, STENT BRONCHUS;  Flexible Bronchoscopy, Bronchial Washings, Balloon Dilation;  Surgeon: Wilber Lin MD;  [facility]     BRONCHOSCOPY, DILATE BRONCHUS, STENT BRONCHUS, COMBINED N/A 8/20/2018    Procedure: COMBINED BRONCHOSCOPY, DILATE BRONCHUS, STENT BRONCHUS;  Flexible Bronchoscopy, Balloon Dilation;  Surgeon: Wilber Lin MD;  [facility]     BRONCHOSCOPY, DILATE BRONCHUS, STENT BRONCHUS, COMBINED N/A 10/29/2018    Procedure: Flexible Bronchoscopy, Balloon Dilation, Stent  Revision, Airway Examination And Therapeutic Suctioning, Cyro Tumor Debulking;  Surgeon: Wilber Lin MD;  [facility]     BRONCHOSCOPY, DILATE BRONCHUS, STENT BRONCHUS, COMBINED N/A 11/20/2018    Procedure: Rigid Bronchoscopy, Stent Removal and dilitation;  Surgeon: Wilber Lin MD;  [facility]     BRONCHOSCOPY, DILATE BRONCHUS, STENT BRONCHUS, COMBINED N/A 12/14/2018    Procedure: Flexible And Rigid Bronchoscopy, Balloon Dilation, Bronchial Washing;  Surgeon: Wilber Lin MD;  [facility]     BRONCHOSCOPY, DILATE BRONCHUS, STENT BRONCHUS, COMBINED N/A 1/17/2019    Procedure: Flexible And Rigid Bronchoscopy, Balloon Dilation.;  Surgeon: Wilber Lin MD;  [facility]     BRONCHOSCOPY, DILATE BRONCHUS, STENT BRONCHUS, COMBINED N/A 3/7/2019    Procedure: Flexible and Rigid Bronchoscopy, Bronchial Washing, Balloon Dilation;  Surgeon: Wilber Lin MD;  [facility]     BRONCHOSCOPY, DILATE BRONCHUS, STENT BRONCHUS, COMBINED N/A 6/6/2019    Procedure: Rigid and Flexible Bronchoscopy, Balloon Dilation;  Surgeon: Wilber Lin MD;  Location[facility]     ENT SURGERY      tonsillectomy as a child     ESOPHAGOSCOPY, GASTROSCOPY, DUODENOSCOPY (EGD), COMBINED N/A 10/29/2018    Procedure: COMBINED ESOPHAGOSCOPY, GASTROSCOPY, DUODENOSCOPY (EGD) with biopsies and polypectomy;  Surgeon: Chente Bloom MD;  [facility]     INSERT EXTRACORPORAL MEMBRANE OXYGENATOR ADULT (OUTSIDE OR) N/A 2/27/2018    Procedure: INSERT EXTRACORPORAL MEMBRANE OXYGENATOR ADULT (OUTSIDE OR);  INSERT EXTRACORPORAL MEMBRANE OXYGENATOR ADULT (OUTSIDE OR) ;  Surgeon: Toby Hernandez MD;  Location: [facility]     no prior surgery       REMOVE EXTRACORPORAL MEMBRANE OXYGENATOR ADULT N/A 3/3/2018    Procedure: REMOVE EXTRACORPORAL MEMBRANE OXYGENATOR ADULT;  Removal of Right Femoral Venous and Right Axillary Arterial Extracorporeal Membrane Oxygenator;  Surgeon:  Toby Hernandez MD;  Location:  OR     TRANSPLANT LUNG RECIPIENT SINGLE X2 Bilateral 3/1/2018    Procedure: TRANSPLANT LUNG RECIPIENT SINGLE X2;  Median Sternotomy, Extracorporeal Membrane Oxygenator, bilateral sequential lung transplant;  Surgeon: Toby Hernandez MD;  Location:  OR       Family History:   Family History   Problem Relation Age of Onset     Hypertension Mother      Arthritis Mother      Pancreatic Cancer Father      Diabetes Father          Social History:   Social History     Socioeconomic History     Marital status:      Spouse name: Not on file     Number of children: Not on file     Years of education: Not on file     Highest education level: Not on file   Occupational History     Not on file   Social Needs     Financial resource strain: Not on file     Food insecurity:     Worry: Not on file     Inability: Not on file     Transportation needs:     Medical: Not on file     Non-medical: Not on file   Tobacco Use     Smoking status: Never Smoker     Smokeless tobacco: Never Used   Substance and Sexual Activity     Alcohol use: No     Alcohol/week: 0.6 oz     Types: 1 Glasses of wine per week     Drug use: No     Sexual activity: Not on file   Lifestyle     Physical activity:     Days per week: Not on file     Minutes per session: Not on file     Stress: Not on file   Relationships     Social connections:     Talks on phone: Not on file     Gets together: Not on file     Attends Zoroastrian service: Not on file     Active member of club or organization: Not on file     Attends meetings of clubs or organizations: Not on file     Relationship status: Not on file     Intimate partner violence:     Fear of current or ex partner: Not on file     Emotionally abused: Not on file     Physically abused: Not on file     Forced sexual activity: Not on file   Other Topics Concern     Parent/sibling w/ CABG, MI or angioplasty before 65F 55M? No   Social History Narrative     3/6/2019 - Lives with . Has three daughters. Four grandchildren (two ). No pets. Travelled previously to [facility]. Has visited [place] several times.        Physical Exam:  BP (!) 172/104   Pulse 72   Temp 98.1  F (36.7  C) (Oral)   Wt 54 kg (119 lb)   LMP 2014 (Exact Date)   SpO2 99%   BMI 20.43 kg/m     GENERAL APPEARANCE: alert and no distress  EYES: nonicteric  HENT: mouth without ulcers or lesions  NECK: supple, no adenopathy  RESP: lungs clear to auscultation   CV: regular rhythm, normal rate, no rub  Extremities: no clubbing, cyanosis, or edema  MS: no evidence of inflammation in joints, no muscle tenderness  SKIN: no rash  NEURO: mentation intact and speech normal  PSYCH: affect normal/bright     Laboratory:  CMP  Recent Labs   Lab Test 19  0959 19  0922 19  0932 19  0900  10/29/18  0549 10/09/18  1257  18  0624  18  0535 18  0605 18  0630    136 136 134   < > 135 134   < > 136   < > 137 137 137   POTASSIUM 4.2 4.1 3.7 4.3   < > 3.8 4.4   < > 4.9   < > 5.3 5.2 4.8   CHLORIDE 98 101 102 101   < > 101 100   < > 108   < > 103 105 106   CO2 29 28 26 25   < > 23 24   < > 20   < > 24 24 21   ANIONGAP 6 7 7 8   < > 11 10   < > 8   < > 10 8 11   * 92 111* 98   < > 117* 151*   < > 108*   < > 131* 110* 137*   BUN 27 30 21 22   < > 25 18   < > 23   < > 34* 30 32*   CR 1.80* 1.15* 0.89 1.25*   < > 1.11* 1.29*   < > 1.23*   < > 1.15* 1.00 1.03   GFRESTIMATED 31* 53* 72 48*   < > 51* 43*   < > 45*   < > 49* 58* 56*   GFRESTBLACK 36* 61 84 56*   < > 62 52*   < > 55*   < > 59* 70 67   CHELSEY 9.8 9.9 8.6 8.8   < > 9.1 9.0   < > 8.2*   < > 8.6 8.5 8.4*   MAG 2.2 2.0 1.6 1.5*   < >  --   --    < > 1.7   < > 1.9 1.8 2.0   PHOS  --   --   --   --   --   --   --   --  2.6  --  3.6 3.1 3.0   PROTTOTAL  --   --   --  7.4  --  6.9 7.4  --  5.7*   < >  --   --   --    ALBUMIN  --   --   --  2.8*  --  2.9* 2.9*  --  2.5*   < > 2.2* 2.4* 2.3*    BILITOTAL  --   --   --  0.3  --  0.3 0.2  --  0.3   < >  --   --   --    ALKPHOS  --   --   --  65  --  73 84  --  72   < >  --   --   --    AST  --   --   --  12  --  10 12  --  8   < >  --   --   --    ALT  --   --   --  11  --  11 12  --  18   < > 29 28 24    < > = values in this interval not displayed.     CBC  Recent Labs   Lab Test 08/07/19  0959 06/18/19 06/05/19  0922 03/05/19  0932 01/16/19  0900   HGB 9.9* 9.8* 9.9* 8.1* 9.2*   WBC 4.8 4.2* 3.2* 3.2* 4.5   RBC 3.33*  --  3.16* 2.67* 3.03*   HCT 32.3* 31.8* 32.8* 27.3* 30.7*   MCV 97 101.3* 104* 102* 101*   MCH 29.7 31.2 31.3 30.3 30.4   MCHC 30.7* 30.8* 30.2* 29.7* 30.0*   RDW 14.4 15.1* 15.1* 15.8* 17.2*     --  278 332 385     INR  Recent Labs   Lab Test 08/07/19  0959 06/05/19  0922 03/05/19  0932 12/13/18  0727  08/03/18  0624  06/11/18  0925  03/05/18  1648 03/05/18  1132   INR 0.91 1.01 1.04 1.02   < > 1.09   < > 0.92   < > 1.11 1.13   PTT  --   --   --   --   --  26  --  26  --  24 27    < > = values in this interval not displayed.     ABG  Recent Labs   Lab Test 03/07/18  0355 03/07/18  0354 03/07/18  0010 03/06/18  2208  03/06/18  1905 03/06/18  1822   PH  --  7.47*  --  7.45  --  7.43 7.43   PCO2  --  35  --  36  --  36 36   PO2  --  113*  --  159*  --  156* 142*   HCO3  --  26  --  25  --  24 24   O2PER 2L 2L 2L 3L   < > 40 40    < > = values in this interval not displayed.      URINE STUDIES  Recent Labs   Lab Test 03/04/18  1425 03/01/18  0528 02/23/18  1820 02/21/18  1500   COLOR Yellow Yellow Yellow Light Yellow   APPEARANCE Slightly Cloudy Clear Clear Clear   URINEGLC Negative Negative 150* Negative   URINEBILI Negative Negative Negative Negative   URINEKETONE Negative 5* Negative Negative   SG 1.026 1.026 1.010 1.021   UBLD Small* Small* Negative Negative   URINEPH 5.5 5.5 6.5 7.5*   PROTEIN 30* 30* 10* 10*   NITRITE Negative Negative Negative Negative   LEUKEST Negative Negative Negative Negative   RBCU 4* 4* 2 1   WBCU 5 4 <1  <1     No lab results found.  PTH  No lab results found.  IRON STUDIES   Recent Labs   Lab Test 12/13/18  1033 08/01/18  0921 05/08/18  0709   IRON 16* 93 48   * 248 275   IRONSAT 7* 37 18   YOLA 302* 571*  --      Scribe Disclosure:   Helen WATERS, am serving as a scribe to document services personally performed by Yenni Gamble MD at this visit, based upon the provider's statements to me. All documentation has been reviewed by the aforementioned provider prior to being entered into the official medical record.     IHelen, served as a scribe preparing the chart for the clinic encounter through chart review for the provider team.     Attestation:  This patient has been seen and evaluated by me, Yenni Gamble MD on 8/7/19 .  Discussed with the fellow or resident and agree with the findings and plan in this note.  I have reviewed  Medications, Vital Signs and Labs as well as provider notes.    Yenni Gamble MD  [facility]  Department of Medicine  Division of Renal Disease and Hypertension  [phone]

## 2019-08-07 NOTE — PROGRESS NOTES
"Transplant Coordinator Note    Reason for visit: Post lung transplant follow up visit   Coordinator: Present   Caregiver:  Ranjan (spouse)    Health concerns addressed today:  1. Lungs are \"fine\". Sputum production - clear, bubbly, at baseline  2. PFTs stable  3.  Just went through 2 weeks of constipation - went through PCP, XR showed lots of gas. Now on stool softener.  4. Creatinine up, HTN - nephrology referral      Activity/rehab: up [place]  Oxygen needs: room air  Pain management/RX: chronic pain in knees - orthopedics appt tomorrow PRN tylenol  Bronch: repeat in September   High risk donor: yes  PJP prophylactic: Bactrim    Pt Education: medications (use/dose/side effects), how/when to call coordinator, frequency of labs, s/s of infection/rejection, call prior to starting any new medications, lab/vital sign book    Health Maintenance:     Last colonoscopy:     Next colonoscopy due:     Dermatology:    Vaccinations this visit:     Labs, CXR, PFTs reviewed with patient  Medication record reviewed and reconciled  Questions and concerns addressed    Patient Instructions  1. Try to hydrate with 60-70 oz lf fluids daily.   2. Try to exercise 30 minutes most days of the week.   3. Make an appointment with nephrology (the kidney doctors), Dr. Gamble today at 3pm.     Next transplant clinic appointment: pending interventional pulmonologist recommendation with CXR, labs and PFTs  Next lab draw: pending tacrolimus level, otherwise next week.       AVS printed at time of check out        "

## 2019-08-07 NOTE — PATIENT INSTRUCTIONS
Dear Kecia Blue      Your were seen in the [facility] for elevated creatinine.  Please give urine sample today    We are suggesting the following medications:  Amlodipine 5 mg every day    Please get the following tests done:  Labs on Friday 8/9/19    Please set up appointment with:  Lilly Arias NP - when you come back in September  Yenni Gamble MD in 4-6 months      It was a pleasure meeting with you today. Thank you for allowing me and my team the privilege of caring for you today.  Please let us know if there is anything else we can do for you.    Shayy Sinclair LPN care coordinator - [phone]  Pierce Mccullough RN care coordinator [phone]    Take care!  Yenni Gamble MD  Department of Medicine  Division of Renal Diseases and Hypertension  [facility]    Email: [email or web address]

## 2019-08-07 NOTE — LETTER
2019      RE: Kecia Blue  [address] [place] [address]         Nephrology Clinic    Yenni Gamble MD  2019     Name: Kecia Blue  MRN: [id number]  Age: 58 year old  : 1960  Referring provider: Srinivas Mcelroy     Assessment and Plan:    # Elevated Creatinine:   - Has had elevated Creatinine of 1.2-1.4 since 2018, Creatinine today 1.8  - Elevated Creatinine may be hemodynamically related to Tacrolimus  - Will obtain a UA and Protein to Creatinine ratio for further evaluation    # Interstitial lung disease status-post bilateral lung tx:   - CXR after transplant demonstrated stable transplant with small left effusion  - DSA 19 negative  - Continue on Tacrolimus (goal 8-10) and Prednisone  - Bactrim prophylaxis     # Pulmonary hypertension: Poorly controlled, 172/104 today  - Currently on Metoprolol  - Start Amlodipine 5 mg daily    # 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     Reason For Visit:   Elevated Creatinine    HPI:   Keica Blue is a 58 year old female with a history of pulmonary hypertension, interstitial lung disease status-post bilateral lung transplant and dermatomyositis with sclerodermal features. The patient had a bilateral lung transplant in 2018. Since then, she has been on immunosuppression medications Tacrolimus and Prednisone, as well as Bactrim prophylaxis. The patient was previously on CellCept but was switched to Imuran in 2018, however this is currently on hold secondary to leukopenia. Her initial baseline Creatinine was 0.8-0.9, however this was consistently elevated to  "1.2-1. Since June 2018, her Creatinine has been consistently elevated to 1.2-1.4; however, today it is 1.8, prompting her arrival to the nephrology clinic today.    Today, the patient presents feeling okay. She states she has had increased muscle aches since the Imuran was stopped. She states she \"hasn't quite felt like myself.\" She reports elevated blood pressures at home of ~150/100 on average. She reports a recent episode of abdominal pain and constipation, however this was resolved with Miralax. She denies NSAID use. She also denies nausea, vomiting and diarrhea.     Review of Systems:   Pertinent items are noted in HPI or as below, remainder of complete ROS is negative.      Active Medications:     Current Outpatient Medications:      ACETAMINOPHEN PO, Take 1,000 mg by mouth every 6 hours as needed for pain, Disp: , Rfl:      amLODIPine (NORVASC) 10 MG tablet, Take 1 tablet (10 mg) by mouth daily, Disp: 30 tablet, Rfl: 11     calcium-vitamin D (CALTRATE) 600-400 MG-UNIT per tablet, Take 1 tablet by mouth daily, Disp: , Rfl:      dronabinol (MARINOL) 5 MG capsule, Take 1 capsule (5 mg) by mouth 2 times daily (before meals), Disp: 60 capsule, Rfl: 0     ferrous sulfate (FEROSUL) 325 (65 Fe) MG tablet, Take 1 tablet (325 mg) by mouth daily (with breakfast), Disp: 60 tablet, Rfl: 2     magnesium oxide (MAG-OX) 400 MG tablet, Take 1 tablet (400 mg) by mouth 2 times daily, Disp: 180 tablet, Rfl: 3     metoprolol tartrate (LOPRESSOR) 25 MG tablet, Take 2 tablets (50 mg) by mouth 2 times daily, Disp: 60 tablet, Rfl: 11     multivitamin, therapeutic with minerals (THERA-VIT-M) TABS tablet, Take 1 tablet by mouth daily, Disp: 30 each, Rfl: 11     ondansetron (ZOFRAN) 4 MG tablet, Take 1 tablet (4 mg) by mouth every 12 hours as needed for nausea, Disp: 60 tablet, Rfl: 0     order for DME, Equipment being ordered: Nebulizer, Disp: 1 Device, Rfl: 1     order for DME, Equipment being ordered: Nebulizer, Disp: 1 Device, Rfl: " 1     pantoprazole (PROTONIX) 40 MG EC tablet, Take 1 tablet (40 mg) by mouth 2 times daily, Disp: 60 tablet, Rfl: 11     predniSONE (DELTASONE) 2.5 MG tablet, Take two tablets (5mg) every AM and one tablet (2.5mg) every evening, Disp: 90 tablet, Rfl: 11     sulfamethoxazole-trimethoprim (BACTRIM/SEPTRA) 400-80 MG tablet, Take 1 tablet by mouth daily, Disp: 90 tablet, Rfl: 3     tacrolimus (GENERIC EQUIVALENT) 0.5 MG capsule, Take 1 capsule (0.5 mg) by mouth every evening Total dose is 3mg in am and 3.5mg in pm., Disp: 30 capsule, Rfl: 11     tacrolimus (GENERIC EQUIVALENT) 1 MG capsule, Total dose is 3mg in morning and 3.5mg in pm, Disp: 540 capsule, Rfl: 3     Allergies:   Patient has no known allergies.      Past Medical History:  Past Medical History:   Diagnosis Date     Antisynthetase syndrome (H) 2014     Chronic cough      Chronic infection     recent C diff  8/18     Dehydration 8/1/2018     Dermatomyositis (H)      Dysplasia of cervix, low grade (ESTRADA 1)      ILD (interstitial lung disease) (H)      Osteopenia      PONV (postoperative nausea and vomiting)      Pulmonary hypertension (H)      Raynaud's disease      Seronegative rheumatoid arthritis (H)      Patient Active Problem List   Diagnosis     Acute on chronic respiratory failure with hypoxia (H)     ILD (interstitial lung disease) (H)     Lung transplant recipient (H)     Steroid-induced hyperglycemia     Deep vein thrombosis (DVT) (H) [I82.409]     Encounter for long-term (current) use of high-risk medication     Dehydration     Acute kidney injury (H)     CMV (cytomegalovirus) (H)     Nausea and vomiting     Low hemoglobin        Past Surgical History:  Past Surgical History:   Procedure Laterality Date     BRONCHOSCOPY (RIGID OR FLEXIBLE), DIAGNOSTIC N/A 4/10/2018    Procedure: COMBINED BRONCHOSCOPY (RIGID OR FLEXIBLE), LAVAGE;;  Surgeon: Mariposa Donohue MD;  Location:  GI     BRONCHOSCOPY (RIGID OR FLEXIBLE), DILATE BRONCHUS / TRACHEA N/A  10/11/2018    Procedure: BRONCHOSCOPY (RIGID OR FLEXIBLE), DILATE BRONCHUS / TRACHEA;  Flexible And Rigid Bronchoscopy and Dilation;  Surgeon: Wilber Lin MD;  Location: [facility]     BRONCHOSCOPY FLEXIBLE N/A 3/13/2018    Procedure: BRONCHOSCOPY FLEXIBLE;  Flexible Bronchoscopy ;  Surgeon: Gissell Sanchez MD;  Location: [facility]     BRONCHOSCOPY FLEXIBLE N/A 5/9/2018    Procedure: BRONCHOSCOPY FLEXIBLE;;  Surgeon: Wilber Lin MD;  Location: [facility]     BRONCHOSCOPY FLEXIBLE AND RIGID N/A 9/10/2018    Procedure: BRONCHOSCOPY FLEXIBLE AND RIGID;  Flexible and Rigid Bronchoscopy with Balloon Dilation, tissue debulking with cryo, and Right mainstem bronchus stent placement;  Surgeon: Wilber Lin MD;  Location: [facility]     BRONCHOSCOPY RIGID N/A 6/6/2018    Procedure: BRONCHOSCOPY RIGID;;  Surgeon: Lopez Macias MD;  Location: [facility]     BRONCHOSCOPY, DILATE BRONCHUS, STENT BRONCHUS, COMBINED N/A 6/11/2018    Procedure: COMBINED BRONCHOSCOPY, DILATE BRONCHUS, STENT BRONCHUS;  Flexible Bronchoscopy, Balloon Dilation, Bronchial Washings;  Surgeon: Wilber Lin MD;  [facility]     BRONCHOSCOPY, DILATE BRONCHUS, STENT BRONCHUS, COMBINED Right 7/10/2018    Procedure: COMBINED BRONCHOSCOPY, DILATE BRONCHUS, STENT BRONCHUS;  Flexible Bronchoscopy, Balloon Dilation, Bronchial Washings  ;  Surgeon: Wilber Lin MD;  [facility]     BRONCHOSCOPY, DILATE BRONCHUS, STENT BRONCHUS, COMBINED N/A 8/2/2018    Procedure: COMBINED BRONCHOSCOPY, DILATE BRONCHUS, STENT BRONCHUS;  Flexible Bronchoscopy, Bronchial Washings, Balloon Dilation;  Surgeon: Wilber Lin MD;  [facility]     BRONCHOSCOPY, DILATE BRONCHUS, STENT BRONCHUS, COMBINED N/A 8/20/2018    Procedure: COMBINED BRONCHOSCOPY, DILATE BRONCHUS, STENT BRONCHUS;  Flexible Bronchoscopy, Balloon Dilation;  Surgeon: Wilber Lin MD;  [facility]     BRONCHOSCOPY, DILATE BRONCHUS, STENT  BRONCHUS, COMBINED N/A 10/29/2018    Procedure: Flexible Bronchoscopy, Balloon Dilation, Stent Revision, Airway Examination And Therapeutic Suctioning, Cyro Tumor Debulking;  Surgeon: Wilber Lin MD;  [facility]     BRONCHOSCOPY, DILATE BRONCHUS, STENT BRONCHUS, COMBINED N/A 11/20/2018    Procedure: Rigid Bronchoscopy, Stent Removal and dilitation;  Surgeon: Wilber Lin MD;  [facility]     BRONCHOSCOPY, DILATE BRONCHUS, STENT BRONCHUS, COMBINED N/A 12/14/2018    Procedure: Flexible And Rigid Bronchoscopy, Balloon Dilation, Bronchial Washing;  Surgeon: Wilber Lin MD;  [facility]     BRONCHOSCOPY, DILATE BRONCHUS, STENT BRONCHUS, COMBINED N/A 1/17/2019    Procedure: Flexible And Rigid Bronchoscopy, Balloon Dilation.;  Surgeon: Wilber Lin MD;  [facility]     BRONCHOSCOPY, DILATE BRONCHUS, STENT BRONCHUS, COMBINED N/A 3/7/2019    Procedure: Flexible and Rigid Bronchoscopy, Bronchial Washing, Balloon Dilation;  Surgeon: Wilber Lin MD;  [facility]     BRONCHOSCOPY, DILATE BRONCHUS, STENT BRONCHUS, COMBINED N/A 6/6/2019    Procedure: Rigid and Flexible Bronchoscopy, Balloon Dilation;  Surgeon: Wilber Lin MD;  Location[facility]     ENT SURGERY      tonsillectomy as a child     ESOPHAGOSCOPY, GASTROSCOPY, DUODENOSCOPY (EGD), COMBINED N/A 10/29/2018    Procedure: COMBINED ESOPHAGOSCOPY, GASTROSCOPY, DUODENOSCOPY (EGD) with biopsies and polypectomy;  Surgeon: Chente Bloom MD;  [facility]     INSERT EXTRACORPORAL MEMBRANE OXYGENATOR ADULT (OUTSIDE OR) N/A 2/27/2018    Procedure: INSERT EXTRACORPORAL MEMBRANE OXYGENATOR ADULT (OUTSIDE OR);  INSERT EXTRACORPORAL MEMBRANE OXYGENATOR ADULT (OUTSIDE OR) ;  Surgeon: Toby Hernandez MD;  Location: [facility]     no prior surgery       REMOVE EXTRACORPORAL MEMBRANE OXYGENATOR ADULT N/A 3/3/2018    Procedure: REMOVE EXTRACORPORAL MEMBRANE OXYGENATOR ADULT;  Removal of  Right Femoral Venous and Right Axillary Arterial Extracorporeal Membrane Oxygenator;  Surgeon: Toby Hernandez MD;  Location: [facility]     TRANSPLANT LUNG RECIPIENT SINGLE X2 Bilateral 3/1/2018    Procedure: TRANSPLANT LUNG RECIPIENT SINGLE X2;  Median Sternotomy, Extracorporeal Membrane Oxygenator, bilateral sequential lung transplant;  Surgeon: Toby Hernandez MD;  Location:  OR       Family History:   Family History   Problem Relation Age of Onset     Hypertension Mother      Arthritis Mother      Pancreatic Cancer Father      Diabetes Father          Social History:   Social History     Socioeconomic History     Marital status:      Spouse name: Not on file     Number of children: Not on file     Years of education: Not on file     Highest education level: Not on file   Occupational History     Not on file   Social Needs     Financial resource strain: Not on file     Food insecurity:     Worry: Not on file     Inability: Not on file     Transportation needs:     Medical: Not on file     Non-medical: Not on file   Tobacco Use     Smoking status: Never Smoker     Smokeless tobacco: Never Used   Substance and Sexual Activity     Alcohol use: No     Alcohol/week: 0.6 oz     Types: 1 Glasses of wine per week     Drug use: No     Sexual activity: Not on file   Lifestyle     Physical activity:     Days per week: Not on file     Minutes per session: Not on file     Stress: Not on file   Relationships     Social connections:     Talks on phone: Not on file     Gets together: Not on file     Attends Islam service: Not on file     Active member of club or organization: Not on file     Attends meetings of clubs or organizations: Not on file     Relationship status: Not on file     Intimate partner violence:     Fear of current or ex partner: Not on file     Emotionally abused: Not on file     Physically abused: Not on file     Forced sexual activity: Not on file   Other Topics Concern      Parent/sibling w/ CABG, MI or angioplasty before 65F 55M? No   Social History Narrative    3/6/2019 - Lives with . Has three daughters. Four grandchildren (two ). No pets. Travelled previously to [facility]. Has visited [place] several times.        Physical Exam:  BP (!) 172/104   Pulse 72   Temp 98.1  F (36.7  C) (Oral)   Wt 54 kg (119 lb)   LMP 2014 (Exact Date)   SpO2 99%   BMI 20.43 kg/m      GENERAL APPEARANCE: alert and no distress  EYES: nonicteric  HENT: mouth without ulcers or lesions  NECK: supple, no adenopathy  RESP: lungs clear to auscultation   CV: regular rhythm, normal rate, no rub  Extremities: no clubbing, cyanosis, or edema  MS: no evidence of inflammation in joints, no muscle tenderness  SKIN: no rash  NEURO: mentation intact and speech normal  PSYCH: affect normal/bright     Laboratory:  CMP  Recent Labs   Lab Test 19  0959 19  0922 19  0932 19  0900  10/29/18  0549 10/09/18  1257  18  0624  18  0535 18  0605 18  0630    136 136 134   < > 135 134   < > 136   < > 137 137 137   POTASSIUM 4.2 4.1 3.7 4.3   < > 3.8 4.4   < > 4.9   < > 5.3 5.2 4.8   CHLORIDE 98 101 102 101   < > 101 100   < > 108   < > 103 105 106   CO2 29 28 26 25   < > 23 24   < > 20   < > 24 24 21   ANIONGAP 6 7 7 8   < > 11 10   < > 8   < > 10 8 11   * 92 111* 98   < > 117* 151*   < > 108*   < > 131* 110* 137*   BUN 27 30 21 22   < > 25 18   < > 23   < > 34* 30 32*   CR 1.80* 1.15* 0.89 1.25*   < > 1.11* 1.29*   < > 1.23*   < > 1.15* 1.00 1.03   GFRESTIMATED 31* 53* 72 48*   < > 51* 43*   < > 45*   < > 49* 58* 56*   GFRESTBLACK 36* 61 84 56*   < > 62 52*   < > 55*   < > 59* 70 67   CHELSEY 9.8 9.9 8.6 8.8   < > 9.1 9.0   < > 8.2*   < > 8.6 8.5 8.4*   MAG 2.2 2.0 1.6 1.5*   < >  --   --    < > 1.7   < > 1.9 1.8 2.0   PHOS  --   --   --   --   --   --   --   --  2.6  --  3.6 3.1 3.0   PROTTOTAL  --   --   --  7.4  --  6.9 7.4  --  5.7*   < >   --   --   --    ALBUMIN  --   --   --  2.8*  --  2.9* 2.9*  --  2.5*   < > 2.2* 2.4* 2.3*   BILITOTAL  --   --   --  0.3  --  0.3 0.2  --  0.3   < >  --   --   --    ALKPHOS  --   --   --  65  --  73 84  --  72   < >  --   --   --    AST  --   --   --  12  --  10 12  --  8   < >  --   --   --    ALT  --   --   --  11  --  11 12  --  18   < > 29 28 24    < > = values in this interval not displayed.     CBC  Recent Labs   Lab Test 08/07/19 0959 06/18/19 06/05/19  0922 03/05/19  0932 01/16/19  0900   HGB 9.9* 9.8* 9.9* 8.1* 9.2*   WBC 4.8 4.2* 3.2* 3.2* 4.5   RBC 3.33*  --  3.16* 2.67* 3.03*   HCT 32.3* 31.8* 32.8* 27.3* 30.7*   MCV 97 101.3* 104* 102* 101*   MCH 29.7 31.2 31.3 30.3 30.4   MCHC 30.7* 30.8* 30.2* 29.7* 30.0*   RDW 14.4 15.1* 15.1* 15.8* 17.2*     --  278 332 385     INR  Recent Labs   Lab Test 08/07/19  0959 06/05/19  0922 03/05/19  0932 12/13/18  0727  08/03/18  0624  06/11/18  0925  03/05/18  1648 03/05/18  1132   INR 0.91 1.01 1.04 1.02   < > 1.09   < > 0.92   < > 1.11 1.13   PTT  --   --   --   --   --  26  --  26  --  24 27    < > = values in this interval not displayed.     ABG  Recent Labs   Lab Test 03/07/18  0355 03/07/18  0354 03/07/18  0010 03/06/18  2208  03/06/18  1905 03/06/18  1822   PH  --  7.47*  --  7.45  --  7.43 7.43   PCO2  --  35  --  36  --  36 36   PO2  --  113*  --  159*  --  156* 142*   HCO3  --  26  --  25  --  24 24   O2PER 2L 2L 2L 3L   < > 40 40    < > = values in this interval not displayed.      URINE STUDIES  Recent Labs   Lab Test 03/04/18  1425 03/01/18  0528 02/23/18  1820 02/21/18  1500   COLOR Yellow Yellow Yellow Light Yellow   APPEARANCE Slightly Cloudy Clear Clear Clear   URINEGLC Negative Negative 150* Negative   URINEBILI Negative Negative Negative Negative   URINEKETONE Negative 5* Negative Negative   SG 1.026 1.026 1.010 1.021   UBLD Small* Small* Negative Negative   URINEPH 5.5 5.5 6.5 7.5*   PROTEIN 30* 30* 10* 10*   NITRITE Negative Negative  Negative Negative   LEUKEST Negative Negative Negative Negative   RBCU 4* 4* 2 1   WBCU 5 4 <1 <1     No lab results found.  PTH  No lab results found.  IRON STUDIES   Recent Labs   Lab Test 12/13/18  1033 08/01/18  0921 05/08/18  0709   IRON 16* 93 48   * 248 275   IRONSAT 7* 37 18   YOLA 302* 571*  --      Scribe Disclosure:   Helen WATERS, am serving as a scribe to document services personally performed by Yenni Gamble MD at this visit, based upon the provider's statements to me. All documentation has been reviewed by the aforementioned provider prior to being entered into the official medical record.     IHelen, served as a scribe preparing the chart for the clinic encounter through chart review for the provider team.     Attestation:  This patient has been seen and evaluated by me, Yenni Gamble MD on 8/7/19 .  Discussed with the fellow or resident and agree with the findings and plan in this note.  I have reviewed  Medications, Vital Signs and Labs as well as provider notes.    Yenni Gamble MD  [facility]  Department of Medicine  Division of Renal Disease and Hypertension  [phone]

## 2019-08-07 NOTE — NURSING NOTE
"Chief Complaint   Patient presents with     RECHECK     follow up   \Vital signs:  Temp: 98.1  F (36.7  C) Temp src: Oral   Pulse: 72     SpO2: 99 %       Weight: 54 kg (119 lb)  Estimated body mass index is 20.43 kg/m  as calculated from the following:    Height as of 6/6/19: 1.626 m (5' 4\").    Weight as of this encounter: 54 kg (119 lb).        Dianne Flores    "

## 2019-08-07 NOTE — LETTER
8/7/2019      RE: Kecia Blue  [address] [place] [address]           [facility]  Pulmonary Medicine/Lung Transplant  August 7, 2019       Today's visit note:       ASSESSMENT/PLAN:    Mrs. Kecia Blue is a 58 y/o female with h/o dermatomyositis, seronegative RA, ILD and pulmonary hypertension s/p bilateral lung transplant on 3/1/18. Post operative course complicated by right main bronchial stenosis, positive DSAs, CMV viremia, C difficile and CMV viremia. She is seen today for scheduled follow up.      Coordinator/MD: AMANDA/JONO     # S/p bilateral lung transplant:  - Continue tacrolimus (goal 8-10) and prednisone  - Bactrim prophylaxis    # Right main bronchial stenosis s/p dilatation: most recently on 3/7/19. No stent currently in place. Scheduled for bronch tomorrow     # Recipient of [facility] increased risk organ donor  - Plan 12 month high risk labs    # H/o CMV viremia: currently not on valcyte. Most recent CMV PCR was 48 copies (normal <35) on 7/30/19-->continue checking PCR every other week.      #. Leukopenia: with WBC of 4.8 today. Improved from prior.  - Imuran remains on hold but plan to restart in approx 1 month if CMV remains negative     # CKD: Cr increased today-->will see Dr. Gamble in Nephrology clinic today     # HTN: Increased metoprolol to 50 mg BID at last visit but still high-->Dr. Gamble to [facility].     # Bilateral knee pain: with bilateral effusions, L>R. - WIll f/u with [facility] in [place] later this week    # Healthcare maintenance    --Influenza and other vaccinations: gave second dose at 6/5/19 visit    --Annual dermatology visit: ???    RTC: pending bronch results    Please also refer to RN Transplant Coordinator note for additional information related to this visit.  PATIENT PROFILE AND TRANSPLANT HISTORY:  Current age:                    58 year old  Underlying lung disease: IIP: Nonspecific Interstitial  Pneumonia    Transplant date(s):        3/1/2018 (Lung)  Transplant POD(s):        523  Lung transplant type(s): Bilateral sequential lung      Transplant coordinator:   Mikayla Latham  Transplant provider(s):        Ame Chow    Active Patient Thresholds     Lab Low High Effective Since Comment    Tacrolimus Level 8 10 06/06/2019 6/6/19 CP          INTERVAL HISTORY:  --Most recent resulted PRA: 6/5/19, neg for DSA    --Most recent lung transplant clinic visit: 6/5/19 (Claudine)    --Interval clinic visits, test results, signif medical events (obtained by chart review and patient hx):    6/6/19: Bronch, balloon bronchoplasty    7/25/19: Valcyte d/c'd-->frequent CMV PCR      --Today:    Ms. Blue return to clinic today for previously scheduled appointment; she was accompanied by her , Ranjan.  The patient reports that her breathing has been very good since her June 5 visit.  She is limited more by her knee discomfort done by shortness of breath.  She is not coughing up significant amounts of sputum and does not have hemoptysis, chest pain, wheezing.    REVIEW OF SYSTEMS:    #She is not having nausea or vomiting.  Appetite is good and weight has been stable    #Some diarrhea but not severe    #She continues to have bilateral knee pain and swelling, left greater than right    #Complete review of systems was asked and was otherwise negative.    PHYSICAL EXAM:  Vitals:    08/07/19 1156 08/07/19 1157   BP: (!) 176/100 (!) 163/94   BP Location: Right arm    Pulse: 72    Temp: 98.1  F (36.7  C)    SpO2: 99%    Weight: 54.4 kg (119 lb 14.4 oz)      Constitutional:    Awake, alert and in no apparent distress   Eyes:    Anicteric, PERRL   ENT:    oral mucosa moist without lesions    Neck:    Supple without supraclavicular or cervical lymphadenopathy    Lungs:    Good air entry both lungs. No crackles. No rhonchi. No wheezes.    Cardiovascular:    Normal S1 and S2. No JVD, murmur, rub, chavez.   Abdomen:     NABS, soft, nontender, nondistended. No HSM.    Musculoskeletal:    No edema.    Neurologic:    Alert and conversant.    Skin:    Warm, dry. No rash seen.          Data:     I reviewed all resulted lab tests and imaging studies.    Results for orders placed or performed during the hospital encounter of 02/22/18   BRONCHOSCOPY   Result Value Ref Range    Bronchoscopy       [facility]  Endoscopy Department-[facility]  _______________________________________________________________________________  Patient Name: Kecia Blue         Procedure Date: 3/13/2018 8:07 AM  MRN: [id number]                       Account #: [id number]  YOB: 1960             Admit Type: Outpatient  Age: 57                               Gender: Female  Note Status: Finalized                Attending MD: Gissell Sanchez MD  Pause for the cause: Pause for the cause completed Total Sedation Time: 13   minutes  _______________________________________________________________________________     Procedure:            Bronchoscopy  Indications:          Surveillance lung transplant  Providers:            Gissell Sanchez MD, Juana Baugh (Fellow), Killian Everett RN, Tatiana Starr, Technician  Referring MD:         Alessandra Lombardi  Medicines:            Midazolam 1.5 mg IV, Fentanyl 75 mcg IV, Lidocaine 4%                          Nebulizer 2.5 mL, Lidocaine 4% applied to cords 9 mL,                         Lidocaine 1% subglottic space 9 mL  Requesting Physician:   Complications:        No immediate complications  _______________________________________________________________________________  Procedure:            After obtaining informed consent, the Bronchoscope was                         introduced through the mouth and advanced to the                         tracheobronchial tree. The procedure was accomplished                         without difficulty. The patient  tolerated the procedure                         well. The total duration of the procedure was 13                         minutes.  Findings:       Anastomoses Examination: The surgical anastomoses in both the right and        left mainstem bronchi are intact and patent. Thick yellow secretions        around right anastamosis present. Remainder of tracheobronchial tree        normal. Minimal thin clear secretions throughout .  Impression:           - Surveillance lung transplant                        - No specimens collected.                        - The examination was normal.  Moderate Sedation:       Moderate (conscious) sedation was administered by the endoscopy nurse        and supervised by the endoscopist. The following parameters were        monitored: oxygen saturation, heart rate, blood pressure, and response        to care. Total physician intraservice time was 13 minutes.  Recommendation:       - Follow up with referring physician.  Attending Participation:       I was present and participated during the entire procedure, including        non-key portions.      __________________  Gissell Sanchez MD  3/13/2018 10:18:57 AM  I was physically present for the entire viewing portion of the exam.  __________________________  Signature of teaching physician  Quiana/Elina Sanchez MD  Number of Addenda: 0    Note Initiated On: 3/13/2018 8:07 AM         PULMONARY FUNCTION TEST INTERPRETATION:  Dated 8/7/2019 were reviewed and interpreted by me.Pulmonary function tests spirometry results are consistent with a restrictive pulmonary function abnormality.  When compared with this patient's most recent previous test, dated 6/5/2019, there have been no significant changes.    STUDIES STILL PENDING AT THE TIME OF THIS NOTE:  Unresulted Labs Ordered in the Past 30 Days of this Admission     Date and Time Order Name Status Description    8/7/2019 1442 BK VIRUS PCR QUANTITATIVE In process     8/7/2019 0942 PRA DONOR  SPECIFIC ANTIBODY In process           Complexity indicators:    --immune compromised, on high-risk medications x   --organ transplant recipient x   --multiple organ transplant recipient    --active respiratory infection    --within one year of transplant; and/or within one month of hospitalization    --chronic lung allograft dysfunction syndrome (CLAD, chronic rejection, or bronchiolitis obliterans syndrome)    --new medical problem addressed during this visit    --multiple active medical problems x   --admitted directly to hospital from this clinic visit    -->50% of this visit was spent in counseling and care coordination. If yes, total visit time was              Medications:     Current Outpatient Medications   Medication     ACETAMINOPHEN PO     calcium-vitamin D (CALTRATE) 600-400 MG-UNIT per tablet     ferrous sulfate (FEROSUL) 325 (65 Fe) MG tablet     magnesium oxide (MAG-OX) 400 MG tablet     metoprolol tartrate (LOPRESSOR) 25 MG tablet     multivitamin, therapeutic with minerals (THERA-VIT-M) TABS tablet     ondansetron (ZOFRAN) 4 MG tablet     order for DME     order for DME     pantoprazole (PROTONIX) 40 MG EC tablet     predniSONE (DELTASONE) 2.5 MG tablet     sulfamethoxazole-trimethoprim (BACTRIM/SEPTRA) 400-80 MG tablet     tacrolimus (GENERIC EQUIVALENT) 0.5 MG capsule     tacrolimus (GENERIC EQUIVALENT) 1 MG capsule     No current facility-administered medications for this visit.             Past Medical and Surgical History:     Past Medical History:   Diagnosis Date     Antisynthetase syndrome (H) 2014     Chronic cough      Chronic infection     recent C diff  8/18     Dehydration 8/1/2018     Dermatomyositis (H)      Dysplasia of cervix, low grade (ESTRADA 1)      ILD (interstitial lung disease) (H)      Osteopenia      PONV (postoperative nausea and vomiting)      Pulmonary hypertension (H)      Raynaud's disease      Seronegative rheumatoid arthritis (H)      Past Surgical History:   Procedure  Laterality Date     BRONCHOSCOPY (RIGID OR FLEXIBLE), DIAGNOSTIC N/A 4/10/2018    Procedure: COMBINED BRONCHOSCOPY (RIGID OR FLEXIBLE), LAVAGE;;  Surgeon: Mariposa Donohue MD;  Location: [facility]     BRONCHOSCOPY (RIGID OR FLEXIBLE), DILATE BRONCHUS / TRACHEA N/A 10/11/2018    Procedure: BRONCHOSCOPY (RIGID OR FLEXIBLE), DILATE BRONCHUS / TRACHEA;  Flexible And Rigid Bronchoscopy and Dilation;  Surgeon: Wilber Lin MD;  Location: [facility]     BRONCHOSCOPY FLEXIBLE N/A 3/13/2018    Procedure: BRONCHOSCOPY FLEXIBLE;  Flexible Bronchoscopy ;  Surgeon: Gissell Sanchez MD;  Location: [facility]     BRONCHOSCOPY FLEXIBLE N/A 5/9/2018    Procedure: BRONCHOSCOPY FLEXIBLE;;  Surgeon: Wliber Lin MD;  Location: [facility]     BRONCHOSCOPY FLEXIBLE AND RIGID N/A 9/10/2018    Procedure: BRONCHOSCOPY FLEXIBLE AND RIGID;  Flexible and Rigid Bronchoscopy with Balloon Dilation, tissue debulking with cryo, and Right mainstem bronchus stent placement;  Surgeon: Wilber Lin MD;  Location: [facility]     BRONCHOSCOPY RIGID N/A 6/6/2018    Procedure: BRONCHOSCOPY RIGID;;  Surgeon: Lopez Macias MD;  Location:  GI     BRONCHOSCOPY, DILATE BRONCHUS, STENT BRONCHUS, COMBINED N/A 6/11/2018    Procedure: COMBINED BRONCHOSCOPY, DILATE BRONCHUS, STENT BRONCHUS;  Flexible Bronchoscopy, Balloon Dilation, Bronchial Washings;  Surgeon: Wilber Lin MD;  Location: [facility]     BRONCHOSCOPY, DILATE BRONCHUS, STENT BRONCHUS, COMBINED Right 7/10/2018    Procedure: COMBINED BRONCHOSCOPY, DILATE BRONCHUS, STENT BRONCHUS;  Flexible Bronchoscopy, Balloon Dilation, Bronchial Washings  ;  Surgeon: Wilber Lin MD;  [facility]     BRONCHOSCOPY, DILATE BRONCHUS, STENT BRONCHUS, COMBINED N/A 8/2/2018    Procedure: COMBINED BRONCHOSCOPY, DILATE BRONCHUS, STENT BRONCHUS;  Flexible Bronchoscopy, Bronchial Washings, Balloon Dilation;  Surgeon: Wilber Lin MD;  [facility]: [facility]     BRONCHOSCOPY,  DILATE BRONCHUS, STENT BRONCHUS, COMBINED N/A 8/20/2018    Procedure: COMBINED BRONCHOSCOPY, DILATE BRONCHUS, STENT BRONCHUS;  Flexible Bronchoscopy, Balloon Dilation;  Surgeon: Wilber Lin MD;  [facility]     BRONCHOSCOPY, DILATE BRONCHUS, STENT BRONCHUS, COMBINED N/A 10/29/2018    Procedure: Flexible Bronchoscopy, Balloon Dilation, Stent Revision, Airway Examination And Therapeutic Suctioning, Cyro Tumor Debulking;  Surgeon: Wilber Lin MD;  Location: [facility]     BRONCHOSCOPY, DILATE BRONCHUS, STENT BRONCHUS, COMBINED N/A 11/20/2018    Procedure: Rigid Bronchoscopy, Stent Removal and dilitation;  Surgeon: Wilber Lin MD;  [facility]: [facility]     BRONCHOSCOPY, DILATE BRONCHUS, STENT BRONCHUS, COMBINED N/A 12/14/2018    Procedure: Flexible And Rigid Bronchoscopy, Balloon Dilation, Bronchial Washing;  Surgeon: Wilber Lin MD;  Location: [facility]     BRONCHOSCOPY, DILATE BRONCHUS, STENT BRONCHUS, COMBINED N/A 1/17/2019    Procedure: Flexible And Rigid Bronchoscopy, Balloon Dilation.;  Surgeon: Wilber Lin MD;  [facility]: [facility]     BRONCHOSCOPY, DILATE BRONCHUS, STENT BRONCHUS, COMBINED N/A 3/7/2019    Procedure: Flexible and Rigid Bronchoscopy, Bronchial Washing, Balloon Dilation;  Surgeon: Wilber Lin MD;  [facility]     BRONCHOSCOPY, DILATE BRONCHUS, STENT BRONCHUS, COMBINED N/A 6/6/2019    Procedure: Rigid and Flexible Bronchoscopy, Balloon Dilation;  Surgeon: Wilber Lin MD;  Location: [facility]     ENT SURGERY      tonsillectomy as a child     ESOPHAGOSCOPY, GASTROSCOPY, DUODENOSCOPY (EGD), COMBINED N/A 10/29/2018    Procedure: COMBINED ESOPHAGOSCOPY, GASTROSCOPY, DUODENOSCOPY (EGD) with biopsies and polypectomy;  Surgeon: Chente Bloom MD;  Location[facility]     INSERT EXTRACORPORAL MEMBRANE OXYGENATOR ADULT (OUTSIDE OR) N/A 2/27/2018    Procedure: INSERT EXTRACORPORAL MEMBRANE OXYGENATOR ADULT (OUTSIDE OR);  INSERT EXTRACORPORAL  "MEMBRANE OXYGENATOR ADULT (OUTSIDE OR) ;  Surgeon: Toby Hernandez MD;  Location: [facility]     no prior surgery       REMOVE EXTRACORPORAL MEMBRANE OXYGENATOR ADULT N/A 3/3/2018    Procedure: REMOVE EXTRACORPORAL MEMBRANE OXYGENATOR ADULT;  Removal of Right Femoral Venous and Right Axillary Arterial Extracorporeal Membrane Oxygenator;  Surgeon: Toby Hernandez MD;  Location: [facility]     TRANSPLANT LUNG RECIPIENT SINGLE X2 Bilateral 3/1/2018    Procedure: TRANSPLANT LUNG RECIPIENT SINGLE X2;  Median Sternotomy, Extracorporeal Membrane Oxygenator, bilateral sequential lung transplant;  Surgeon: Toby Hernandze MD;  Location: [facility]           Family History:     Family History   Problem Relation Age of Onset     Hypertension Mother      Arthritis Mother      Pancreatic Cancer Father      Diabetes Father                            Transplant Coordinator Note    Reason for visit: Post lung transplant follow up visit   Coordinator: Present   Caregiver:  Ranjan (spouse)    Health concerns addressed today:  1. Lungs are \"fine\". Sputum production - clear, bubbly, at baseline  2. PFTs stable  3.  Just went through 2 weeks of constipation - went through PCP, XR showed lots of gas. Now on stool softener.  4. Creatinine up, HTN - nephrology referral      Activity/rehab: up [place]  Oxygen needs: room air  Pain management/RX: chronic pain in knees - orthopedics appt tomorrow PRN tylenol  Bronch: repeat in September   High risk donor: yes  PJP prophylactic: Bactrim    Pt Education: medications (use/dose/side effects), how/when to call coordinator, frequency of labs, s/s of infection/rejection, call prior to starting any new medications, lab/vital sign book    Health Maintenance:     Last colonoscopy:     Next colonoscopy due:     Dermatology:    Vaccinations this visit:     Labs, CXR, PFTs reviewed with patient  Medication record reviewed and reconciled  Questions and concerns addressed    Patient " Instructions  1. Try to hydrate with 60-70 oz lf fluids daily.   2. Try to exercise 30 minutes most days of the week.   3. Make an appointment with nephrology (the kidney doctors), Dr. Gamble today at 3pm.     Next transplant clinic appointment: pending interventional pulmonologist recommendation with CXR, labs and PFTs  Next lab draw: pending tacrolimus level, otherwise next week.       AVS printed at time of check out          Srinivas Mcelroy MD

## 2019-08-07 NOTE — NURSING NOTE
"Chief Complaint   Patient presents with     RECHECK     Follow up lUng TX     Vital signs:  Temp: 98.1  F (36.7  C)   BP: (!) 163/94 Pulse: 72     SpO2: 99 %       Weight: 54.4 kg (119 lb 14.4 oz)  Estimated body mass index is 20.58 kg/m  as calculated from the following:    Height as of 6/6/19: 1.626 m (5' 4\").    Weight as of this encounter: 54.4 kg (119 lb 14.4 oz).      Nancy Love CMA    "

## 2019-08-07 NOTE — PATIENT INSTRUCTIONS
Patient Instructions  1. Try to hydrate with 60-70 oz lf fluids daily.   2. Try to exercise 30 minutes most days of the week.   3. Make an appointment with nephrology (the kidney doctors), Dr. Gamble today at 3pm.     Next transplant clinic appointment: pending interventional pulmonologist recommendation with CXR, labs and PFTs  Next lab draw: pending tacrolimus level, otherwise next week.     ~~~~~~~~~~~~~~~~~~~~~~~~~    Thoracic Transplant Office phone [phone], fax [phone]    Office Hours 8:30 - 5:00     For after-hours urgent issues, please dial [phone], and ask to speak with the Thoracic Transplant Coordinator  On-Call, pager [phone].  --------------------  To expedite your medication refill(s), please contact your pharmacy and have them fax a refill request to: [phone]  .   *Please allow 3 business days for routine medication refills.  *Please allow 5 business days for controlled substance medication refills.    **For Diabetic medications and supplies refill(s), please contact your pharmacy and have them  Contact your Endocrine team.  --------------------  For scheduling appointments call [facility] :  [phone]. For lab appointments call [phone] or [facility].  --------------------  Please Note: If you are active on [facility], all future test results will be sent by [facility] message only, and will no longer be called to patient. You may also receive communication directly from your physician.

## 2019-08-08 ENCOUNTER — TELEPHONE (OUTPATIENT)
Dept: TRANSPLANT | Facility: CLINIC | Age: 59
End: 2019-08-08

## 2019-08-08 DIAGNOSIS — Z94.2 S/P LUNG TRANSPLANT (H): ICD-10-CM

## 2019-08-08 LAB
EXPTIME-PRE: 5.94 SEC
FEF2575-%PRED-PRE: 47 %
FEF2575-PRE: 1.17 L/SEC
FEF2575-PRED: 2.45 L/SEC
FEFMAX-%PRED-PRE: 55 %
FEFMAX-PRE: 3.6 L/SEC
FEFMAX-PRED: 6.49 L/SEC
FEV1-%PRED-PRE: 61 %
FEV1-PRE: 1.6 L
FEV1FEV6-PRE: 72 %
FEV1FEV6-PRED: 81 %
FEV1FVC-PRE: 72 %
FEV1FVC-PRED: 80 %
FIFMAX-PRE: 2.64 L/SEC
FVC-%PRED-PRE: 67 %
FVC-PRE: 2.22 L
FVC-PRED: 3.29 L

## 2019-08-08 RX ORDER — TACROLIMUS 0.5 MG/1
0.5 CAPSULE ORAL EVERY EVENING
Qty: 30 CAPSULE | Refills: 11 | Status: ON HOLD | OUTPATIENT
Start: 2019-08-08 | End: 2019-08-22

## 2019-08-08 RX ORDER — TACROLIMUS 1 MG/1
CAPSULE ORAL
Qty: 450 CAPSULE | Refills: 3 | Status: ON HOLD | OUTPATIENT
Start: 2019-08-08 | End: 2019-08-22

## 2019-08-08 NOTE — TELEPHONE ENCOUNTER
Tacrolimus level 12.2 at 12 hours, on 8-6-19  Goal 8-10.   Current dose 3 mg in AM, 3.5 mg in PM    Dose changed to  3 mg in AM, 2.5 mg in PM   Recheck level in 7-10 days  Creatinine is also increased at 1.8, make sure you are drinking at least 70 oz of fluids daily.  Please call us to confirm you received the dose change and drinking recommendations.

## 2019-08-09 ENCOUNTER — TELEPHONE (OUTPATIENT)
Dept: TRANSPLANT | Facility: CLINIC | Age: 59
End: 2019-08-09

## 2019-08-09 ENCOUNTER — TRANSFERRED RECORDS (OUTPATIENT)
Dept: HEALTH INFORMATION MANAGEMENT | Facility: CLINIC | Age: 59
End: 2019-08-09

## 2019-08-09 LAB
BKV DNA # SPEC NAA+PROBE: NORMAL COPIES/ML
BKV DNA SPEC NAA+PROBE-LOG#: NORMAL LOG COPIES/ML
DONOR IDENTIFICATION: NORMAL
DSA COMMENTS: NORMAL
DSA PRESENT: NO
DSA TEST METHOD: NORMAL
ORGAN: NORMAL
SA1 CELL: NORMAL
SA1 COMMENTS: NORMAL
SA1 HI RISK ABY: NORMAL
SA1 MOD RISK ABY: NORMAL
SA1 TEST METHOD: NORMAL
SA2 CELL: NORMAL
SA2 COMMENTS: NORMAL
SA2 HI RISK ABY UA: NORMAL
SA2 MOD RISK ABY: NORMAL
SA2 TEST METHOD: NORMAL
SPECIMEN SOURCE: NORMAL
UNACCEPTABLE ANTIGEN: NORMAL
UNOS CPRA: 0

## 2019-08-09 NOTE — TELEPHONE ENCOUNTER
Pt calls to confirm tacrolimus dose change. Pt is also wondering if she needs to continue to take Protonix as she discussed that this can affect kidney function in her nephrology appointment. Will message Ame Chow to see if she can trial stopping this medication.

## 2019-08-12 ENCOUNTER — TELEPHONE (OUTPATIENT)
Dept: TRANSPLANT | Facility: CLINIC | Age: 59
End: 2019-08-12

## 2019-08-12 NOTE — TELEPHONE ENCOUNTER
Per Ame Chow, okay to decrease protonix to 40 mg daily. Left VM with patient requesting call back to make medication change.

## 2019-08-13 ENCOUNTER — EXTERNAL ORDER RESULTS (OUTPATIENT)
Dept: PULMONOLOGY | Facility: CLINIC | Age: 59
End: 2019-08-13

## 2019-08-13 ENCOUNTER — TRANSFERRED RECORDS (OUTPATIENT)
Dept: HEALTH INFORMATION MANAGEMENT | Facility: CLINIC | Age: 59
End: 2019-08-13

## 2019-08-13 DIAGNOSIS — Z94.2 LUNG REPLACED BY TRANSPLANT (H): ICD-10-CM

## 2019-08-13 LAB
% IMMATURE GRANULOCYTES: 11.8
ABS LYMPHOCYTES: 0.7
ABS MONOCYTE: 1.1
ABS NEUTROPHILS: 6.7
ANION GAP SERPL CALCULATED.3IONS-SCNC: 14.1 MMOL/L
BASOPHILS NFR BLD AUTO: 0.2 %
BUN SERPL-MCNC: 39.3 MG/DL
BUN/CREATININE RATIO: 24.56
CALCIUM SERPL-MCNC: 8.9 MG/DL
CHLORIDE SERPLBLD-SCNC: 106 MMOL/L
CO2 SERPL-SCNC: 22 MMOL/L
CREAT SERPL-MCNC: 1.6 MG/DL
EOSINOPHIL NFR BLD AUTO: 0.2 %
ERYTHROCYTE [DISTWIDTH] IN BLOOD BY AUTOMATED COUNT: 14.8 %
GFR SERPL CREATININE-BSD FRML MDRD: 36 ML/MIN/1.73M2
GLUCOSE SERPL-MCNC: 162 MG/DL (ref 70–99)
HCT VFR BLD AUTO: 31.6 %
HEMOGLOBIN: 9.8 G/DL (ref 11.7–15.7)
LYMPHOCYTES NFR BLD AUTO: 6.9 %
MAGNESIUM SERPL-MCNC: 1.7 MG/DL
MCH RBC QN AUTO: 29.6 PG
MCHC RBC AUTO-ENTMCNC: 31 G/DL
MCV RBC AUTO: 95.5 FL
MONOCYTES NFR BLD AUTO: 11.4 %
NEUTROPHILS NFR BLD AUTO: 69.5 %
PHOSPHATE SERPL-MCNC: 2.6 MG/DL
PLATELET # BLD AUTO: 220 10^9/L
PMV BLD: 10.4 FL
POTASSIUM SERPL-SCNC: 4.1 MMOL/L
RBC # BLD AUTO: 3.31 10^12/L
SODIUM SERPL-SCNC: 138 MMOL/L
WBC COUNT (EXTERNAL): 9.6

## 2019-08-13 PROCEDURE — 80197 ASSAY OF TACROLIMUS: CPT | Performed by: INTERNAL MEDICINE

## 2019-08-14 LAB
TACROLIMUS BLD-MCNC: 7.7 UG/L (ref 5–15)
TME LAST DOSE: NORMAL H

## 2019-08-15 ENCOUNTER — TELEPHONE (OUTPATIENT)
Dept: RHEUMATOLOGY | Facility: CLINIC | Age: 59
End: 2019-08-15

## 2019-08-15 NOTE — RESULT ENCOUNTER NOTE
Armand Mcdonnell, your tacrolimus level was 7.7 at 12 hours on 8/13/19 which is close to your goal range of 8-10. No dose change at this time. Please call the transplant office ([phone]) with any questions. Thanks, Mikayla

## 2019-08-15 NOTE — TELEPHONE ENCOUNTER
----- Message from Yenni Gamble MD sent at 8/15/2019 10:37 AM CDT -----  Regarding: RE: elevated creat, proteinuria  CK was 36 last week, I didn't think to check CRP.  Thanks!  ----- Message -----  From: Franklyn Jolly MD  Sent: 8/14/2019   9:42 PM  To: Yenni Gamble MD, #  Subject: RE: elevated creat, proteinuria                  I see she just had labs. If her DM is active CRP and possibly CK should be up.     Joao, can you please order those, and have lab do them on existing serum?     Thanks.       ----- Message -----  From: Yenni Gamble MD  Sent: 8/14/2019   5:26 PM  To: Franklyn Jolly MD, Srinivas Mcelroy MD  Subject: elevated creat, proteinuria                      Hi Srinivas and Franklyn - I saw Kecia last week for elevated creatinine, she has hematuria and proteinuria so I am planning kidney biopsy next week.  She also just told me she is having new ?knee pain and thinks it is dermatomyositis.  She tells me she feels worse compared to when she was on MMF or azathioprine and that she really did not have that much GI problem from MMF.    I am planning kidney biopsy next week.  Franklyn, I think it would be nice if she could get back in to see you.  Srinivas - she has decreased pantoprazole to 40 mg daily and she would like to come off it - I told her to discuss with you.  I think she came off the imuran because of leukopenia, her WBC is good right now so maybe she can go on MMF and see whether she tolerates from GI standpoint.    Thanks!    eYnni

## 2019-08-16 DIAGNOSIS — R79.89 ELEVATED SERUM CREATININE: Primary | ICD-10-CM

## 2019-08-16 DIAGNOSIS — R80.9 PROTEINURIA: ICD-10-CM

## 2019-08-16 RX ORDER — LIDOCAINE 40 MG/G
CREAM TOPICAL
Status: CANCELLED | OUTPATIENT
Start: 2019-08-16

## 2019-08-16 RX ORDER — DEXTROSE MONOHYDRATE 25 G/50ML
25-50 INJECTION, SOLUTION INTRAVENOUS
Status: CANCELLED | OUTPATIENT
Start: 2019-08-16

## 2019-08-16 RX ORDER — NICOTINE POLACRILEX 4 MG
15-30 LOZENGE BUCCAL
Status: CANCELLED | OUTPATIENT
Start: 2019-08-16

## 2019-08-16 NOTE — PROGRESS NOTES
Kidney biopsy scheduled for Next Thursday 8/22 at 900 procedure time. My chart sent to patient. Did discuss over the phone with patient.  Shayy Sinclair LPN  Nephrology  [phone]

## 2019-08-20 DIAGNOSIS — Z94.2 LUNG REPLACED BY TRANSPLANT (H): ICD-10-CM

## 2019-08-20 PROCEDURE — 80197 ASSAY OF TACROLIMUS: CPT | Performed by: INTERNAL MEDICINE

## 2019-08-21 ENCOUNTER — TELEPHONE (OUTPATIENT)
Dept: TRANSPLANT | Facility: CLINIC | Age: 59
End: 2019-08-21

## 2019-08-21 LAB
TACROLIMUS BLD-MCNC: 18.4 UG/L (ref 5–15)
TME LAST DOSE: ABNORMAL H

## 2019-08-21 NOTE — TELEPHONE ENCOUNTER
Patient Call: Voicemail  Date/Time: 8/21/19 at 1:33 pm  Reason for call: patient is needing refills no medication/ pharmacy listed

## 2019-08-21 NOTE — TELEPHONE ENCOUNTER
Spoke with patient who states she will be going out of town on 8/26/19 for two weeks. Is unable to get a refill of magnesium or amlodipine as refill is too soon. Spoke with local [facility]. They are able to refill magnesium now as this is run through a discount card. The amlodipine was still rejecting even with a vacation hold code. He said the easiest option would be to just send to a local [facility] wherever patient is on vacation or he'd need some more details about the trip and he would call the insurance to try to get approval for an early refill. Spoke with patient, she will just call us mid next week with where she will be on 8/30 so we can send the refill to the closest [facility].

## 2019-08-22 ENCOUNTER — HOSPITAL ENCOUNTER (OUTPATIENT)
Facility: CLINIC | Age: 59
Discharge: HOME | End: 2019-08-22
Attending: RADIOLOGY | Admitting: INTERNAL MEDICINE
Payer: COMMERCIAL

## 2019-08-22 ENCOUNTER — TELEPHONE (OUTPATIENT)
Dept: TRANSPLANT | Facility: CLINIC | Age: 59
End: 2019-08-22

## 2019-08-22 ENCOUNTER — APPOINTMENT (OUTPATIENT)
Dept: MEDSURG UNIT | Facility: CLINIC | Age: 59
End: 2019-08-22
Attending: RADIOLOGY
Payer: COMMERCIAL

## 2019-08-22 ENCOUNTER — HOSPITAL ENCOUNTER (OUTPATIENT)
Dept: ULTRASOUND IMAGING | Facility: CLINIC | Age: 59
End: 2019-08-22
Attending: INTERNAL MEDICINE | Admitting: RADIOLOGY
Payer: COMMERCIAL

## 2019-08-22 VITALS
OXYGEN SATURATION: 100 % | DIASTOLIC BLOOD PRESSURE: 81 MMHG | SYSTOLIC BLOOD PRESSURE: 141 MMHG | HEART RATE: 80 BPM | RESPIRATION RATE: 16 BRPM | TEMPERATURE: 98.3 F

## 2019-08-22 DIAGNOSIS — Z94.2 S/P LUNG TRANSPLANT (H): ICD-10-CM

## 2019-08-22 DIAGNOSIS — R79.89 ELEVATED SERUM CREATININE: ICD-10-CM

## 2019-08-22 DIAGNOSIS — R80.9 PROTEINURIA: ICD-10-CM

## 2019-08-22 LAB
CRP SERPL-MCNC: 47 MG/L (ref 0–8)
HGB BLD-MCNC: 8.9 G/DL (ref 11.7–15.7)
HGB BLD-MCNC: 9.9 G/DL (ref 11.7–15.7)
INR PPP: 0.94 (ref 0.86–1.14)

## 2019-08-22 PROCEDURE — 76942 ECHO GUIDE FOR BIOPSY: CPT | Mod: TC

## 2019-08-22 PROCEDURE — 85018 HEMOGLOBIN: CPT | Performed by: INTERNAL MEDICINE

## 2019-08-22 PROCEDURE — 88313 SPECIAL STAINS GROUP 2: CPT | Performed by: RADIOLOGY

## 2019-08-22 PROCEDURE — 86140 C-REACTIVE PROTEIN: CPT | Performed by: INTERNAL MEDICINE

## 2019-08-22 PROCEDURE — 88350 IMFLUOR EA ADDL 1ANTB STN PX: CPT | Performed by: RADIOLOGY

## 2019-08-22 PROCEDURE — 88305 TISSUE EXAM BY PATHOLOGIST: CPT | Performed by: RADIOLOGY

## 2019-08-22 PROCEDURE — 25000125 ZZHC RX 250: Performed by: INTERNAL MEDICINE

## 2019-08-22 PROCEDURE — 85610 PROTHROMBIN TIME: CPT | Performed by: INTERNAL MEDICINE

## 2019-08-22 PROCEDURE — 40000168 ZZH STATISTIC PP CARE STAGE 3

## 2019-08-22 PROCEDURE — 88348 ELECTRON MICROSCOPY DX: CPT | Performed by: RADIOLOGY

## 2019-08-22 PROCEDURE — 88346 IMFLUOR 1ST 1ANTB STAIN PX: CPT | Performed by: RADIOLOGY

## 2019-08-22 RX ORDER — DEXTROSE MONOHYDRATE 25 G/50ML
25-50 INJECTION, SOLUTION INTRAVENOUS
Status: DISCONTINUED | OUTPATIENT
Start: 2019-08-22 | End: 2019-08-23 | Stop reason: HOSPADM

## 2019-08-22 RX ORDER — NICOTINE POLACRILEX 4 MG
15-30 LOZENGE BUCCAL
Status: DISCONTINUED | OUTPATIENT
Start: 2019-08-22 | End: 2019-08-23 | Stop reason: HOSPADM

## 2019-08-22 RX ORDER — LIDOCAINE 40 MG/G
CREAM TOPICAL
Status: DISCONTINUED | OUTPATIENT
Start: 2019-08-22 | End: 2019-08-23 | Stop reason: HOSPADM

## 2019-08-22 RX ORDER — TACROLIMUS 0.5 MG/1
CAPSULE ORAL
Qty: 30 CAPSULE | Refills: 0 | COMMUNITY
Start: 2019-08-22 | End: 2019-08-29

## 2019-08-22 RX ORDER — LIDOCAINE HYDROCHLORIDE 10 MG/ML
30 INJECTION, SOLUTION INFILTRATION; PERINEURAL ONCE
Status: COMPLETED | OUTPATIENT
Start: 2019-08-22 | End: 2019-08-22

## 2019-08-22 RX ORDER — TACROLIMUS 1 MG/1
2 CAPSULE ORAL 2 TIMES DAILY
Qty: 360 CAPSULE | Refills: 3 | Status: SHIPPED | OUTPATIENT
Start: 2019-08-22 | End: 2019-08-29

## 2019-08-22 RX ADMIN — LIDOCAINE HYDROCHLORIDE 7 ML: 10 INJECTION, SOLUTION EPIDURAL; INFILTRATION; INTRACAUDAL; PERINEURAL at 09:59

## 2019-08-22 NOTE — IP AVS SNAPSHOT
MRN:[id number]                      After Visit Summary   8/22/2019    Kecia Blue    MRN: [id number]           Visit Information        Department      8/22/2019  6:43 AM [address] [facility] [place]          Review of your medicines      UNREVIEWED medicines. Ask your doctor about these medicines       Dose / Directions   ACETAMINOPHEN PO      Dose:  1000 mg  Take 1,000 mg by mouth every 6 hours as needed for pain  Refills:  0     amLODIPine 10 MG tablet  Commonly known as:  NORVASC  Used for:  Accelerated essential hypertension      Dose:  10 mg  Take 1 tablet (10 mg) by mouth daily  Quantity:  30 tablet  Refills:  11     calcium carbonate 600 mg-vitamin D 400 units 600-400 MG-UNIT per tablet  Commonly known as:  CALTRATE  Used for:  S/P lung transplant (H), ILD (interstitial lung disease) (H), Lung transplant recipient (H), Encounter for long-term (current) use of high-risk medication, Anemia, unspecified type, Cytomegalovirus (CMV) viremia (H)      Dose:  1 tablet  Take 1 tablet by mouth daily  Refills:  0     docusate sodium 50 MG capsule  Commonly known as:  COLACE  Used for:  Lung replaced by transplant (H), Constipation, unspecified constipation type      Dose:  100 mg  Take 2 capsules (100 mg) by mouth 2 times daily as needed for constipation  Quantity:  50 capsule  Refills:  0     dronabinol 5 MG capsule  Commonly known as:  MARINOL  Used for:  S/P lung transplant (H)      Dose:  5 mg  Take 1 capsule (5 mg) by mouth 2 times daily (before meals)  Quantity:  60 capsule  Refills:  0     ferrous sulfate 325 (65 Fe) MG tablet  Commonly known as:  FEROSUL  Used for:  S/P lung transplant (H)      Dose:  325 mg  Take 1 tablet (325 mg) by mouth daily (with breakfast)  Quantity:  60 tablet  Refills:  2     magnesium oxide 400 MG tablet  Commonly known as:  MAG-OX  Used for:  Hypomagnesemia      Dose:  400 mg  Take 1 tablet (400 mg) by mouth 2 times daily  Quantity:  180 tablet  Refills:  3      metoprolol tartrate 25 MG tablet  Commonly known as:  LOPRESSOR  Used for:  Paroxysmal atrial fibrillation (H)      Dose:  50 mg  Take 2 tablets (50 mg) by mouth 2 times daily  Quantity:  60 tablet  Refills:  11     multivitamin w/minerals tablet  Used for:  Lung transplant recipient (H)      Dose:  1 tablet  Take 1 tablet by mouth daily  Quantity:  30 each  Refills:  11     ondansetron 4 MG tablet  Commonly known as:  ZOFRAN  Used for:  Intractable vomiting with nausea, unspecified vomiting type      Dose:  4 mg  Take 1 tablet (4 mg) by mouth every 12 hours as needed for nausea  Quantity:  60 tablet  Refills:  0     pantoprazole 40 MG EC tablet  Commonly known as:  PROTONIX  Used for:  Gastroesophageal reflux disease without esophagitis, ILD (interstitial lung disease) (H), Lung transplant recipient (H)      Dose:  40 mg  Take 1 tablet (40 mg) by mouth 2 times daily  Quantity:  60 tablet  Refills:  11     predniSONE 2.5 MG tablet  Commonly known as:  DELTASONE  Used for:  Lung replaced by transplant (H)      Take two tablets (5mg) every AM and one tablet (2.5mg) every evening  Quantity:  90 tablet  Refills:  11     sulfamethoxazole-trimethoprim 400-80 MG tablet  Commonly known as:  BACTRIM/SEPTRA  Used for:  Lung replaced by transplant (H), Need for pneumocystis prophylaxis      Dose:  1 tablet  Take 1 tablet by mouth daily  Quantity:  90 tablet  Refills:  3     * tacrolimus 1 MG capsule  Commonly known as:  GENERIC EQUIVALENT  Used for:  S/P lung transplant (H)      Dose:  2 mg  Take 2 capsules (2 mg) by mouth 2 times daily Total dose is 2mg in morning and 2mg evening  Quantity:  360 capsule  Refills:  3     * tacrolimus 0.5 MG capsule  Commonly known as:  GENERIC EQUIVALENT  Used for:  S/P lung transplant (H)      Not needed currently hold for dose changes  Quantity:  30 capsule  Refills:  0         * This list has 2 medication(s) that are the same as other medications prescribed for you. Read the directions  carefully, and ask your doctor or other care provider to review them with you.            CONTINUE these medicines which have NOT CHANGED       Dose / Directions   order for DME  Used for:  Status post lung transplantation (H)      Equipment being ordered: Nebulizer  Quantity:  1 Device  Refills:  1     order for DME  Used for:  Lung transplant recipient (H)      Equipment being ordered: Nebulizer  Quantity:  1 Device  Refills:  1           Where to get your medicines      These medications will be mailed to you     From:  [facility] - [facility] - [place], [place] - [address] Phone:  [phone]   tacrolimus 1 MG capsule           Protect others around you: Learn how to safely use, store and throw away your medicines at www.disposemymeds.org.       Follow-ups after your visit       Care Instructions       Further instructions from your care team       [facility] Care Following Kidney Biopsy    Physician: Dr. Gamble           Date:August 22, 2019    ACTIVITY:      Relax and take it easy, no strenuous activity for 24 hours.    No heavy lifting (>10 lbs.) for 1 week    DIET:            Resume your regular diet and drink plenty of fluids, unless you are fluid restricted                      DRAINAGE:    There should be minimal drainage from the biopsy site. If bleeding soaks the dressing, you should lie down and apply pressure to the site for a minimum of 10 minutes. If the bleeding persists, refer to the emergency contact numbers below; whether the bleeding persists or not, you should report the occurrence to one of the numbers below.    Refer to the emergency numbers below for the following:    Excessive bleeding or drainage    Excessive swelling, redness, or tenderness at the site    Fever above 100.5 degrees orally    Severe pain    Drainage that is green, yellow, thick white, or has a bad odor    Passage of bloody urine  or clots after you are discharged.     HOLD Aspirin and NSAIDS  For 7 Days    Emergency Contact Numbers:             [phone]      Ask for the Adult Nephrology Fellow on Call, someone is available 24 hours a day.                Additional Information About Your Visit       [employer] Information    [employer] gives you secure access to your electronic health record. If you see a primary care provider, you can also send messages to your care team and make appointments. If you have questions, please call your primary care clinic.  If you do not have a primary care provider, please call [phone] and they will assist you.       Care EveryWhere ID    This is your Care EveryWhere ID. This could be used by other organizations to access your [facility] medical records  [id number]       Your Vitals Were     Blood Pressure   132/79          Pulse   80          Temperature   98.3  F (36.8  C) (Oral)          Respirations   16          Last Period   06/07/2014 (Exact Date)             Pulse Oximetry   100%          Primary Care Provider Office Phone # Fax #    Rosy Vu [phone]      Equal Access to Services    [facility] : Hadii aad ku hadasho Soomaali, waaxda luqadaha, qaybta kaalmada adeegyada, morro daily . So [facility] [phone].    ATENCIÓN: Si habla español, tiene a taylor disposición servicios gratuitos de asistencia lingüística. Llame al [phone].    We comply with applicable federal civil rights laws and [place] laws. We do not discriminate on the basis of race, color, national origin, age, disability, sex, sexual orientation, or gender identity.           Thank you!    Thank you for choosing [facility] for your care. Our goal is always to provide you with excellent care. Hearing back from our patients is one way we can continue to improve our services. Please take a few minutes to complete the written survey that you may receive in the mail after you  visit with us. Thank you!            Medication List      Medications          Morning Afternoon Evening Bedtime As Needed    order for DME  INSTRUCTIONS:  Equipment being ordered: Nebulizer                     order for DME  INSTRUCTIONS:  Equipment being ordered: Nebulizer                       ASK your doctor about these medications          Morning Afternoon Evening Bedtime As Needed    ACETAMINOPHEN PO  INSTRUCTIONS:  Take 1,000 mg by mouth every 6 hours as needed for pain                     amLODIPine 10 MG tablet  Also known as:  NORVASC  INSTRUCTIONS:  Take 1 tablet (10 mg) by mouth daily                     calcium carbonate 600 mg-vitamin D 400 units 600-400 MG-UNIT per tablet  Also known as:  CALTRATE  INSTRUCTIONS:  Take 1 tablet by mouth daily                     docusate sodium 50 MG capsule  Also known as:  COLACE  INSTRUCTIONS:  Take 2 capsules (100 mg) by mouth 2 times daily as needed for constipation                     dronabinol 5 MG capsule  Also known as:  MARINOL  INSTRUCTIONS:  Take 1 capsule (5 mg) by mouth 2 times daily (before meals)                     ferrous sulfate 325 (65 Fe) MG tablet  Also known as:  FEROSUL  INSTRUCTIONS:  Take 1 tablet (325 mg) by mouth daily (with breakfast)                     magnesium oxide 400 MG tablet  Also known as:  MAG-OX  INSTRUCTIONS:  Take 1 tablet (400 mg) by mouth 2 times daily                     metoprolol tartrate 25 MG tablet  Also known as:  LOPRESSOR  INSTRUCTIONS:  Take 2 tablets (50 mg) by mouth 2 times daily  Doctor's comments:  Please remind patient to call pharmacy for refills. Thanks!                     multivitamin w/minerals tablet  INSTRUCTIONS:  Take 1 tablet by mouth daily                     ondansetron 4 MG tablet  Also known as:  ZOFRAN  INSTRUCTIONS:  Take 1 tablet (4 mg) by mouth every 12 hours as needed for nausea                     pantoprazole 40 MG EC tablet  Also known as:  PROTONIX  INSTRUCTIONS:  Take 1 tablet (40 mg)  by mouth 2 times daily                     predniSONE 2.5 MG tablet  Also known as:  DELTASONE  INSTRUCTIONS:  Take two tablets (5mg) every AM and one tablet (2.5mg) every evening                     sulfamethoxazole-trimethoprim 400-80 MG tablet  Also known as:  BACTRIM/SEPTRA  INSTRUCTIONS:  Take 1 tablet by mouth daily                     * tacrolimus 1 MG capsule  Also known as:  GENERIC EQUIVALENT  INSTRUCTIONS:  Take 2 capsules (2 mg) by mouth 2 times daily Total dose is 2mg in morning and 2mg evening                     * tacrolimus 0.5 MG capsule  Also known as:  GENERIC EQUIVALENT  INSTRUCTIONS:  Not needed currently hold for dose changes                        * This list has 2 medication(s) that are the same as other medications prescribed for you. Read the directions carefully, and ask your doctor or other care provider to review them with you.

## 2019-08-22 NOTE — DISCHARGE INSTRUCTIONS
[facility] Care Following Kidney Biopsy    Physician: Dr. Gamble           Date:August 22, 2019    ACTIVITY:      Relax and take it easy, no strenuous activity for 24 hours.    No heavy lifting (>10 lbs.) for 1 week    DIET:            Resume your regular diet and drink plenty of fluids, unless you are fluid restricted                      DRAINAGE:    There should be minimal drainage from the biopsy site. If bleeding soaks the dressing, you should lie down and apply pressure to the site for a minimum of 10 minutes. If the bleeding persists, refer to the emergency contact numbers below; whether the bleeding persists or not, you should report the occurrence to one of the numbers below.    Refer to the emergency numbers below for the following:    Excessive bleeding or drainage    Excessive swelling, redness, or tenderness at the site    Fever above 100.5 degrees orally    Severe pain    Drainage that is green, yellow, thick white, or has a bad odor    Passage of bloody urine or clots after you are discharged.     HOLD Aspirin and NSAIDS  For 7 Days    Emergency Contact Numbers:             [phone]      Ask for the Adult Nephrology Fellow on Call, someone is available 24 hours a day.

## 2019-08-22 NOTE — PROGRESS NOTES
0830 Pt on 2a prepped for ultrasound kidney biopsy. PIV placed and labs sent. Family at bedside. Pt ready for consent.

## 2019-08-22 NOTE — PROGRESS NOTES
1005  Returned from US per cart accompanied by Tech.  S/P right native kidney biopsy.  Site at right flank is FDI.  No hematoma noted.  Denies any pain.  Fluids encouraged.  1015  Nutrition of chicken soup taken well.  Drinking water.

## 2019-08-22 NOTE — IP AVS SNAPSHOT
[address] [address]                                    After Visit Summary   8/22/2019    Kecia Blue    MRN: [id number]           After Visit Summary Signature Page    I have received my discharge instructions, and my questions have been answered. I have discussed any challenges I see with this plan with the nurse or doctor.    ..........................................................................................................................................  Patient/Patient Representative Signature      ..........................................................................................................................................  Patient Representative Print Name and Relationship to Patient    ..................................................               ................................................  Date                                   Time    ..........................................................................................................................................  Reviewed by Signature/Title    ...................................................              ..............................................  Date                                               Time          22EPIC Rev 08/18

## 2019-08-22 NOTE — PROCEDURES
08/22/2019   Procedure: right native kidney biopsy  Physician: Yenni Gamble MD   Consesnt:  Kecia Blue     The indications and risks of the kidney biopsy, including bleeding severe enough to require hospitalization, transfusion, surgery, or even loss of the kidney or death, were explained, understood and agreed.  The right kidney was visualized under ultrasound and biopsy site marked.  Patient was prepped and draped in the usual sterile manner.  Biopsy site was anesthetized with 8 ml of 1% lidocaine.  3 passes with a 18 ga needle under direct ultrasound guidance were made and tissue was sent to pathology.  There were no immediate complications.  Pressure was held over biopsy site and patient will be observed for signs of bleeding or other complications.    Yenni Gamble MD  [facility]  Department of Medicine  Division of Renal Disease and Hypertension  [phone]

## 2019-08-26 ENCOUNTER — TELEPHONE (OUTPATIENT)
Dept: TRANSPLANT | Facility: CLINIC | Age: 59
End: 2019-08-26

## 2019-08-26 DIAGNOSIS — Z94.2 LUNG REPLACED BY TRANSPLANT (H): ICD-10-CM

## 2019-08-26 LAB — COPATH REPORT: NORMAL

## 2019-08-26 PROCEDURE — 80197 ASSAY OF TACROLIMUS: CPT | Performed by: INTERNAL MEDICINE

## 2019-08-26 NOTE — TELEPHONE ENCOUNTER
Per Dr. Patel re elevated CMV: Repeat CMV once before going out of town (patient got labs today, 8/26/19). If it is < 1,200 international unit(s)/ml, I think she can stay off Valcyte for now, but then should get a repeat as soon as she gets back.  If the viral load before her departure is > 1,200, she should go back on induction dose Valcyte and get labs (CBC / diff, creatinine, CMV VL) as soon as she returns.     Discussed with patient. Will await labs from today.

## 2019-08-28 ENCOUNTER — HOSPITAL ENCOUNTER (OUTPATIENT)
Facility: CLINIC | Age: 59
End: 2019-08-28
Attending: INTERNAL MEDICINE | Admitting: INTERNAL MEDICINE
Payer: COMMERCIAL

## 2019-08-28 ENCOUNTER — TELEPHONE (OUTPATIENT)
Dept: PULMONOLOGY | Facility: CLINIC | Age: 59
End: 2019-08-28

## 2019-08-28 DIAGNOSIS — J98.09 BRONCHIAL STENOSIS: Primary | ICD-10-CM

## 2019-08-28 DIAGNOSIS — Z94.2 LUNG REPLACED BY TRANSPLANT (H): Primary | ICD-10-CM

## 2019-08-28 LAB
TACROLIMUS BLD-MCNC: 11.4 UG/L (ref 5–15)
TME LAST DOSE: NORMAL H

## 2019-08-28 NOTE — TELEPHONE ENCOUNTER
Spoke with patient to schedule procedure with Dr. Alana Lin    Procedure was scheduled on 10/11 at [facility] OR  Patient will have H&P with Pulmonary  Patient is aware a / is needed day of surgery.   Surgery letter was sent via [employer], patient has my direct contact information for any further questions.

## 2019-08-29 ENCOUNTER — TELEPHONE (OUTPATIENT)
Dept: TRANSPLANT | Facility: CLINIC | Age: 59
End: 2019-08-29

## 2019-08-29 DIAGNOSIS — Z94.2 S/P LUNG TRANSPLANT (H): ICD-10-CM

## 2019-08-29 RX ORDER — TACROLIMUS 1 MG/1
CAPSULE ORAL
Qty: 270 CAPSULE | Refills: 3 | Status: SHIPPED | OUTPATIENT
Start: 2019-08-29 | End: 2019-09-26

## 2019-08-29 RX ORDER — TACROLIMUS 0.5 MG/1
0.5 CAPSULE ORAL EVERY EVENING
Qty: 90 CAPSULE | Refills: 3 | Status: SHIPPED | OUTPATIENT
Start: 2019-08-29 | End: 2019-09-26

## 2019-09-03 DIAGNOSIS — I10 ACCELERATED ESSENTIAL HYPERTENSION: ICD-10-CM

## 2019-09-03 RX ORDER — AMLODIPINE BESYLATE 10 MG/1
10 TABLET ORAL DAILY
Qty: 30 TABLET | Refills: 11 | Status: ON HOLD | OUTPATIENT
Start: 2019-09-03 | End: 2019-10-29

## 2019-09-04 ENCOUNTER — TELEPHONE (OUTPATIENT)
Dept: PULMONOLOGY | Facility: CLINIC | Age: 59
End: 2019-09-04

## 2019-09-11 ENCOUNTER — HOSPITAL ENCOUNTER (INPATIENT)
Facility: CLINIC | Age: 59
LOS: 3 days | Discharge: HOME | DRG: 205 | End: 2019-09-15
Attending: EMERGENCY MEDICINE | Admitting: STUDENT IN AN ORGANIZED HEALTH CARE EDUCATION/TRAINING PROGRAM
Payer: COMMERCIAL

## 2019-09-11 ENCOUNTER — APPOINTMENT (OUTPATIENT)
Dept: ULTRASOUND IMAGING | Facility: CLINIC | Age: 59
DRG: 205 | End: 2019-09-11
Attending: EMERGENCY MEDICINE
Payer: COMMERCIAL

## 2019-09-11 ENCOUNTER — TELEPHONE (OUTPATIENT)
Dept: TRANSPLANT | Facility: CLINIC | Age: 59
End: 2019-09-11

## 2019-09-11 DIAGNOSIS — Z94.2 S/P LUNG TRANSPLANT (H): ICD-10-CM

## 2019-09-11 DIAGNOSIS — G89.29 CHRONIC PAIN OF RIGHT KNEE: ICD-10-CM

## 2019-09-11 DIAGNOSIS — K81.9 CHOLECYSTITIS: ICD-10-CM

## 2019-09-11 DIAGNOSIS — J86.9 EMPYEMA OF LUNG (H): ICD-10-CM

## 2019-09-11 DIAGNOSIS — M25.561 CHRONIC PAIN OF RIGHT KNEE: ICD-10-CM

## 2019-09-11 DIAGNOSIS — K59.00 CONSTIPATION, UNSPECIFIED CONSTIPATION TYPE: Primary | ICD-10-CM

## 2019-09-11 DIAGNOSIS — J18.9 PNEUMONIA DUE TO INFECTIOUS ORGANISM, UNSPECIFIED LATERALITY, UNSPECIFIED PART OF LUNG: ICD-10-CM

## 2019-09-11 DIAGNOSIS — R10.13 ABDOMINAL PAIN, EPIGASTRIC: ICD-10-CM

## 2019-09-11 DIAGNOSIS — Z94.2 LUNG TRANSPLANT RECIPIENT (H): ICD-10-CM

## 2019-09-11 LAB
INR PPP: 1.06 (ref 0.86–1.14)
LACTATE BLD-SCNC: 1 MMOL/L (ref 0.7–2)

## 2019-09-11 PROCEDURE — 80053 COMPREHEN METABOLIC PANEL: CPT | Performed by: EMERGENCY MEDICINE

## 2019-09-11 PROCEDURE — 87040 BLOOD CULTURE FOR BACTERIA: CPT | Performed by: EMERGENCY MEDICINE

## 2019-09-11 PROCEDURE — 99285 EMERGENCY DEPT VISIT HI MDM: CPT | Mod: Z6 | Performed by: EMERGENCY MEDICINE

## 2019-09-11 PROCEDURE — 25000128 H RX IP 250 OP 636: Performed by: EMERGENCY MEDICINE

## 2019-09-11 PROCEDURE — 96361 HYDRATE IV INFUSION ADD-ON: CPT | Performed by: EMERGENCY MEDICINE

## 2019-09-11 PROCEDURE — 85610 PROTHROMBIN TIME: CPT | Performed by: EMERGENCY MEDICINE

## 2019-09-11 PROCEDURE — 86140 C-REACTIVE PROTEIN: CPT | Performed by: EMERGENCY MEDICINE

## 2019-09-11 PROCEDURE — 85652 RBC SED RATE AUTOMATED: CPT | Performed by: EMERGENCY MEDICINE

## 2019-09-11 PROCEDURE — 76705 ECHO EXAM OF ABDOMEN: CPT

## 2019-09-11 PROCEDURE — 99285 EMERGENCY DEPT VISIT HI MDM: CPT | Mod: 25 | Performed by: EMERGENCY MEDICINE

## 2019-09-11 PROCEDURE — 85025 COMPLETE CBC W/AUTO DIFF WBC: CPT | Performed by: EMERGENCY MEDICINE

## 2019-09-11 PROCEDURE — 83605 ASSAY OF LACTIC ACID: CPT | Performed by: EMERGENCY MEDICINE

## 2019-09-11 PROCEDURE — 83690 ASSAY OF LIPASE: CPT | Performed by: EMERGENCY MEDICINE

## 2019-09-11 PROCEDURE — 96375 TX/PRO/DX INJ NEW DRUG ADDON: CPT | Performed by: EMERGENCY MEDICINE

## 2019-09-11 PROCEDURE — 25800030 ZZH RX IP 258 OP 636: Performed by: EMERGENCY MEDICINE

## 2019-09-11 RX ORDER — SODIUM CHLORIDE, SODIUM LACTATE, POTASSIUM CHLORIDE, CALCIUM CHLORIDE 600; 310; 30; 20 MG/100ML; MG/100ML; MG/100ML; MG/100ML
INJECTION, SOLUTION INTRAVENOUS CONTINUOUS
Status: DISCONTINUED | OUTPATIENT
Start: 2019-09-11 | End: 2019-09-12

## 2019-09-11 RX ORDER — MORPHINE SULFATE 2 MG/ML
4 INJECTION, SOLUTION INTRAMUSCULAR; INTRAVENOUS ONCE
Status: COMPLETED | OUTPATIENT
Start: 2019-09-11 | End: 2019-09-11

## 2019-09-11 RX ADMIN — SODIUM CHLORIDE, POTASSIUM CHLORIDE, SODIUM LACTATE AND CALCIUM CHLORIDE: 600; 310; 30; 20 INJECTION, SOLUTION INTRAVENOUS at 23:23

## 2019-09-11 RX ADMIN — MORPHINE SULFATE 4 MG: 2 INJECTION, SOLUTION INTRAMUSCULAR; INTRAVENOUS at 23:23

## 2019-09-11 ASSESSMENT — MIFFLIN-ST. JEOR: SCORE: 1119.32

## 2019-09-11 NOTE — TELEPHONE ENCOUNTER
Patient Call: Transplant Illness      Duration of illness: few days plus  Transplanted organ? lung  Illness: Pain: Location Left side kidney and having extreme gas pain unable to have a bowel movement   Please call Kecia # [phone]

## 2019-09-11 NOTE — TELEPHONE ENCOUNTER
Patient calls reporting constipation and abdominal pain that has been occurring for >2 weeks. Has not had a BM in two weeks. Had been taking Miralax and Colace prior to going on vacation, but this was causing diarrhea, so she had stopped taking it. Reports the L side of her stomach hurts and appears distended. She reports being in considerable pain. Requested patient to be seen in closest ER or urgent care for evaluation due to pain and how long symptoms have been persisting for already. They are about 5 hours away from home right now, but will be stopping in the current city. She will have the doctor their call in-house pulmonologist for any needs, otherwise can call transplant office if necessary. She agrees with this plan.

## 2019-09-11 NOTE — TELEPHONE ENCOUNTER
Patient Call:   Kecia has more questions, see previous note.    Call back needed? Yes    Return Call Needed  Same as documented in contacts section  When to return call?: Same day: [facility]

## 2019-09-11 NOTE — TELEPHONE ENCOUNTER
Kecia was seen in local ER in [place]. Spoke with patient and doctor on the phone. X-ray was done which showed concern for illieus vs obstruction. CT scan was done which showed gallstones and thickening of the gallbladder wall and edema and reading radiologist recommended an US. They wondered if US should be done there or she should come to [facility] since if she were to end up needing surgery, this would be done at [facility]. Bilirubin and LFTs normal other than elevated alk phos, not jaundiced. Othewrise is not showing the typical symptoms of gallstones since the pain is on her R side. Doctor there stated Kecia would be stable for discharge then they will drive to ER. ER doctor will call report to [facility] ER. They are giving Kecia something for the pain prior to discharge. Discussed waiting to take PO meds until in the ER here in case they find out she may need surgery.

## 2019-09-12 ENCOUNTER — APPOINTMENT (OUTPATIENT)
Dept: GENERAL RADIOLOGY | Facility: CLINIC | Age: 59
DRG: 205 | End: 2019-09-12
Attending: STUDENT IN AN ORGANIZED HEALTH CARE EDUCATION/TRAINING PROGRAM
Payer: COMMERCIAL

## 2019-09-12 ENCOUNTER — APPOINTMENT (OUTPATIENT)
Dept: MRI IMAGING | Facility: CLINIC | Age: 59
DRG: 205 | End: 2019-09-12
Attending: STUDENT IN AN ORGANIZED HEALTH CARE EDUCATION/TRAINING PROGRAM
Payer: COMMERCIAL

## 2019-09-12 ENCOUNTER — APPOINTMENT (OUTPATIENT)
Dept: CT IMAGING | Facility: CLINIC | Age: 59
DRG: 205 | End: 2019-09-12
Attending: STUDENT IN AN ORGANIZED HEALTH CARE EDUCATION/TRAINING PROGRAM
Payer: COMMERCIAL

## 2019-09-12 ENCOUNTER — APPOINTMENT (OUTPATIENT)
Dept: GENERAL RADIOLOGY | Facility: CLINIC | Age: 59
DRG: 205 | End: 2019-09-12
Attending: NURSE PRACTITIONER
Payer: COMMERCIAL

## 2019-09-12 PROBLEM — K81.9 CHOLECYSTITIS: Status: ACTIVE | Noted: 2019-09-12

## 2019-09-12 LAB
ALBUMIN SERPL-MCNC: 2.8 G/DL (ref 3.4–5)
ALBUMIN UR-MCNC: 30 MG/DL
ALP SERPL-CCNC: 255 U/L (ref 40–150)
ALT SERPL W P-5'-P-CCNC: 17 U/L (ref 0–50)
ANION GAP SERPL CALCULATED.3IONS-SCNC: 11 MMOL/L (ref 3–14)
ANISOCYTOSIS BLD QL SMEAR: SLIGHT
APPEARANCE UR: CLEAR
AST SERPL W P-5'-P-CCNC: 29 U/L (ref 0–45)
BASOPHILS # BLD AUTO: 0.1 10E9/L (ref 0–0.2)
BASOPHILS NFR BLD AUTO: 0.9 %
BILIRUB SERPL-MCNC: 0.4 MG/DL (ref 0.2–1.3)
BILIRUB UR QL STRIP: NEGATIVE
BUN SERPL-MCNC: 24 MG/DL (ref 7–30)
CALCIUM SERPL-MCNC: 9.2 MG/DL (ref 8.5–10.1)
CHLORIDE SERPL-SCNC: 100 MMOL/L (ref 94–109)
CO2 SERPL-SCNC: 22 MMOL/L (ref 20–32)
COLOR UR AUTO: ABNORMAL
CREAT SERPL-MCNC: 1.28 MG/DL (ref 0.52–1.04)
CRP SERPL-MCNC: 66 MG/L (ref 0–8)
DIFFERENTIAL METHOD BLD: ABNORMAL
EOSINOPHIL # BLD AUTO: 0 10E9/L (ref 0–0.7)
EOSINOPHIL NFR BLD AUTO: 0 %
ERYTHROCYTE [DISTWIDTH] IN BLOOD BY AUTOMATED COUNT: 15.5 % (ref 10–15)
ERYTHROCYTE [SEDIMENTATION RATE] IN BLOOD BY WESTERGREN METHOD: 102 MM/H (ref 0–30)
GFR SERPL CREATININE-BSD FRML MDRD: 46 ML/MIN/{1.73_M2}
GLUCOSE SERPL-MCNC: 76 MG/DL (ref 70–99)
GLUCOSE UR STRIP-MCNC: NEGATIVE MG/DL
GRAM STN SPEC: NORMAL
HCT VFR BLD AUTO: 30.8 % (ref 35–47)
HGB BLD-MCNC: 9.1 G/DL (ref 11.7–15.7)
HGB UR QL STRIP: NEGATIVE
KETONES UR STRIP-MCNC: 10 MG/DL
LEUKOCYTE ESTERASE UR QL STRIP: NEGATIVE
LIPASE SERPL-CCNC: 127 U/L (ref 73–393)
LYMPHOCYTES # BLD AUTO: 0.5 10E9/L (ref 0.8–5.3)
LYMPHOCYTES NFR BLD AUTO: 8.1 %
MCH RBC QN AUTO: 27.7 PG (ref 26.5–33)
MCHC RBC AUTO-ENTMCNC: 29.5 G/DL (ref 31.5–36.5)
MCV RBC AUTO: 94 FL (ref 78–100)
MONOCYTES # BLD AUTO: 0.5 10E9/L (ref 0–1.3)
MONOCYTES NFR BLD AUTO: 9 %
MUCOUS THREADS #/AREA URNS LPF: PRESENT /LPF
NEUTROPHILS # BLD AUTO: 5 10E9/L (ref 1.6–8.3)
NEUTROPHILS NFR BLD AUTO: 82 %
NITRATE UR QL: NEGATIVE
PH UR STRIP: 7.5 PH (ref 5–7)
PLATELET # BLD AUTO: 189 10E9/L (ref 150–450)
PLATELET # BLD EST: ABNORMAL 10*3/UL
POTASSIUM SERPL-SCNC: 4.2 MMOL/L (ref 3.4–5.3)
PROT SERPL-MCNC: 8.3 G/DL (ref 6.8–8.8)
RADIOLOGIST FLAGS: ABNORMAL
RBC # BLD AUTO: 3.28 10E12/L (ref 3.8–5.2)
RBC #/AREA URNS AUTO: <1 /HPF (ref 0–2)
SODIUM SERPL-SCNC: 133 MMOL/L (ref 133–144)
SOURCE: ABNORMAL
SP GR UR STRIP: 1.01 (ref 1–1.03)
SPECIMEN SOURCE: NORMAL
UROBILINOGEN UR STRIP-MCNC: NORMAL MG/DL (ref 0–2)
WBC # BLD AUTO: 6.1 10E9/L (ref 4–11)
WBC #/AREA URNS AUTO: 3 /HPF (ref 0–5)

## 2019-09-12 PROCEDURE — 21400000 ZZH R&B CCU UMMC

## 2019-09-12 PROCEDURE — 71046 X-RAY EXAM CHEST 2 VIEWS: CPT

## 2019-09-12 PROCEDURE — 25000128 H RX IP 250 OP 636: Performed by: EMERGENCY MEDICINE

## 2019-09-12 PROCEDURE — 87186 SC STD MICRODIL/AGAR DIL: CPT | Performed by: NURSE PRACTITIONER

## 2019-09-12 PROCEDURE — 99223 1ST HOSP IP/OBS HIGH 75: CPT | Mod: AI | Performed by: STUDENT IN AN ORGANIZED HEALTH CARE EDUCATION/TRAINING PROGRAM

## 2019-09-12 PROCEDURE — 25000131 ZZH RX MED GY IP 250 OP 636 PS 637: Performed by: HOSPITALIST

## 2019-09-12 PROCEDURE — 87102 FUNGUS ISOLATION CULTURE: CPT | Performed by: NURSE PRACTITIONER

## 2019-09-12 PROCEDURE — A9585 GADOBUTROL INJECTION: HCPCS | Performed by: HOSPITALIST

## 2019-09-12 PROCEDURE — 25500064 ZZH RX 255 OP 636: Performed by: HOSPITALIST

## 2019-09-12 PROCEDURE — 0WJ83ZZ INSPECTION OF CHEST WALL, PERCUTANEOUS APPROACH: ICD-10-PCS | Performed by: PHYSICIAN ASSISTANT

## 2019-09-12 PROCEDURE — 25000132 ZZH RX MED GY IP 250 OP 250 PS 637: Performed by: STUDENT IN AN ORGANIZED HEALTH CARE EDUCATION/TRAINING PROGRAM

## 2019-09-12 PROCEDURE — 74183 MRI ABD W/O CNTR FLWD CNTR: CPT

## 2019-09-12 PROCEDURE — 71260 CT THORAX DX C+: CPT

## 2019-09-12 PROCEDURE — 25000128 H RX IP 250 OP 636: Performed by: HOSPITALIST

## 2019-09-12 PROCEDURE — 87077 CULTURE AEROBIC IDENTIFY: CPT | Performed by: NURSE PRACTITIONER

## 2019-09-12 PROCEDURE — 87040 BLOOD CULTURE FOR BACTERIA: CPT | Performed by: EMERGENCY MEDICINE

## 2019-09-12 PROCEDURE — 87205 SMEAR GRAM STAIN: CPT | Performed by: NURSE PRACTITIONER

## 2019-09-12 PROCEDURE — 96365 THER/PROPH/DIAG IV INF INIT: CPT | Performed by: EMERGENCY MEDICINE

## 2019-09-12 PROCEDURE — 25800030 ZZH RX IP 258 OP 636: Performed by: STUDENT IN AN ORGANIZED HEALTH CARE EDUCATION/TRAINING PROGRAM

## 2019-09-12 PROCEDURE — 74018 RADEX ABDOMEN 1 VIEW: CPT

## 2019-09-12 PROCEDURE — 81001 URINALYSIS AUTO W/SCOPE: CPT | Performed by: EMERGENCY MEDICINE

## 2019-09-12 PROCEDURE — 36415 COLL VENOUS BLD VENIPUNCTURE: CPT | Performed by: NURSE PRACTITIONER

## 2019-09-12 PROCEDURE — 25000132 ZZH RX MED GY IP 250 OP 250 PS 637: Performed by: HOSPITALIST

## 2019-09-12 PROCEDURE — 87070 CULTURE OTHR SPECIMN AEROBIC: CPT | Performed by: NURSE PRACTITIONER

## 2019-09-12 RX ORDER — POLYETHYLENE GLYCOL 3350 17 G/17G
17 POWDER, FOR SOLUTION ORAL 2 TIMES DAILY
Status: DISCONTINUED | OUTPATIENT
Start: 2019-09-12 | End: 2019-09-15 | Stop reason: HOSPADM

## 2019-09-12 RX ORDER — TACROLIMUS 1 MG/1
2 CAPSULE ORAL EVERY MORNING
Status: DISCONTINUED | OUTPATIENT
Start: 2019-09-12 | End: 2019-09-15 | Stop reason: HOSPADM

## 2019-09-12 RX ORDER — MAGNESIUM OXIDE 400 MG/1
400 TABLET ORAL 2 TIMES DAILY
Status: DISCONTINUED | OUTPATIENT
Start: 2019-09-12 | End: 2019-09-15 | Stop reason: HOSPADM

## 2019-09-12 RX ORDER — AMLODIPINE BESYLATE 10 MG/1
10 TABLET ORAL DAILY
Status: DISCONTINUED | OUTPATIENT
Start: 2019-09-12 | End: 2019-09-15 | Stop reason: HOSPADM

## 2019-09-12 RX ORDER — PANTOPRAZOLE SODIUM 40 MG/1
40 TABLET, DELAYED RELEASE ORAL 2 TIMES DAILY
Status: DISCONTINUED | OUTPATIENT
Start: 2019-09-12 | End: 2019-09-15 | Stop reason: HOSPADM

## 2019-09-12 RX ORDER — PREDNISONE 5 MG/1
5 TABLET ORAL DAILY
Status: DISCONTINUED | OUTPATIENT
Start: 2019-09-12 | End: 2019-09-15 | Stop reason: HOSPADM

## 2019-09-12 RX ORDER — LIDOCAINE 40 MG/G
CREAM TOPICAL
Status: DISCONTINUED | OUTPATIENT
Start: 2019-09-12 | End: 2019-09-15 | Stop reason: HOSPADM

## 2019-09-12 RX ORDER — PROCHLORPERAZINE 25 MG
25 SUPPOSITORY, RECTAL RECTAL EVERY 12 HOURS PRN
Status: DISCONTINUED | OUTPATIENT
Start: 2019-09-12 | End: 2019-09-15 | Stop reason: HOSPADM

## 2019-09-12 RX ORDER — PIPERACILLIN SODIUM, TAZOBACTAM SODIUM 3; .375 G/15ML; G/15ML
3.38 INJECTION, POWDER, LYOPHILIZED, FOR SOLUTION INTRAVENOUS EVERY 6 HOURS
Status: DISCONTINUED | OUTPATIENT
Start: 2019-09-12 | End: 2019-09-13

## 2019-09-12 RX ORDER — MAGNESIUM OXIDE 400 MG/1
400 TABLET ORAL DAILY
Status: ON HOLD | COMMUNITY
End: 2021-02-25

## 2019-09-12 RX ORDER — DOCUSATE SODIUM 100 MG/1
100 CAPSULE, LIQUID FILLED ORAL 2 TIMES DAILY PRN
Status: DISCONTINUED | OUTPATIENT
Start: 2019-09-12 | End: 2019-09-15 | Stop reason: HOSPADM

## 2019-09-12 RX ORDER — MULTIPLE VITAMINS W/ MINERALS TAB 9MG-400MCG
1 TAB ORAL DAILY
Status: DISCONTINUED | OUTPATIENT
Start: 2019-09-12 | End: 2019-09-15 | Stop reason: HOSPADM

## 2019-09-12 RX ORDER — AMOXICILLIN 250 MG
1 CAPSULE ORAL 2 TIMES DAILY
Status: DISCONTINUED | OUTPATIENT
Start: 2019-09-12 | End: 2019-09-12

## 2019-09-12 RX ORDER — IOPAMIDOL 755 MG/ML
59 INJECTION, SOLUTION INTRAVASCULAR ONCE
Status: COMPLETED | OUTPATIENT
Start: 2019-09-12 | End: 2019-09-12

## 2019-09-12 RX ORDER — PROCHLORPERAZINE MALEATE 5 MG
10 TABLET ORAL EVERY 6 HOURS PRN
Status: DISCONTINUED | OUTPATIENT
Start: 2019-09-12 | End: 2019-09-15 | Stop reason: HOSPADM

## 2019-09-12 RX ORDER — PIPERACILLIN SODIUM, TAZOBACTAM SODIUM 3; .375 G/15ML; G/15ML
3.38 INJECTION, POWDER, LYOPHILIZED, FOR SOLUTION INTRAVENOUS ONCE
Status: COMPLETED | OUTPATIENT
Start: 2019-09-12 | End: 2019-09-12

## 2019-09-12 RX ORDER — ACETAMINOPHEN 325 MG/1
650 TABLET ORAL EVERY 6 HOURS PRN
Status: DISCONTINUED | OUTPATIENT
Start: 2019-09-12 | End: 2019-09-13

## 2019-09-12 RX ORDER — OXYCODONE HYDROCHLORIDE 5 MG/1
5 TABLET ORAL EVERY 4 HOURS PRN
Status: DISCONTINUED | OUTPATIENT
Start: 2019-09-12 | End: 2019-09-13

## 2019-09-12 RX ORDER — SULFAMETHOXAZOLE AND TRIMETHOPRIM 400; 80 MG/1; MG/1
1 TABLET ORAL DAILY
Status: DISCONTINUED | OUTPATIENT
Start: 2019-09-12 | End: 2019-09-15 | Stop reason: HOSPADM

## 2019-09-12 RX ORDER — DRONABINOL 5 MG/1
5 CAPSULE ORAL
COMMUNITY
End: 2019-11-11

## 2019-09-12 RX ORDER — ONDANSETRON 4 MG/1
4 TABLET, ORALLY DISINTEGRATING ORAL EVERY 6 HOURS PRN
Status: DISCONTINUED | OUTPATIENT
Start: 2019-09-12 | End: 2019-09-15 | Stop reason: HOSPADM

## 2019-09-12 RX ORDER — AMOXICILLIN 250 MG
2 CAPSULE ORAL 2 TIMES DAILY
Status: DISCONTINUED | OUTPATIENT
Start: 2019-09-12 | End: 2019-09-15 | Stop reason: HOSPADM

## 2019-09-12 RX ORDER — NALOXONE HYDROCHLORIDE 0.4 MG/ML
.1-.4 INJECTION, SOLUTION INTRAMUSCULAR; INTRAVENOUS; SUBCUTANEOUS
Status: DISCONTINUED | OUTPATIENT
Start: 2019-09-12 | End: 2019-09-15 | Stop reason: HOSPADM

## 2019-09-12 RX ORDER — POLYETHYLENE GLYCOL 3350 17 G/17G
17 POWDER, FOR SOLUTION ORAL DAILY PRN
Status: DISCONTINUED | OUTPATIENT
Start: 2019-09-12 | End: 2019-09-15 | Stop reason: HOSPADM

## 2019-09-12 RX ORDER — FERROUS SULFATE 325(65) MG
325 TABLET ORAL
Status: DISCONTINUED | OUTPATIENT
Start: 2019-09-12 | End: 2019-09-15 | Stop reason: HOSPADM

## 2019-09-12 RX ORDER — GADOBUTROL 604.72 MG/ML
7.5 INJECTION INTRAVENOUS ONCE
Status: COMPLETED | OUTPATIENT
Start: 2019-09-12 | End: 2019-09-12

## 2019-09-12 RX ORDER — PREDNISONE 2.5 MG/1
2.5 TABLET ORAL EVERY EVENING
Status: DISCONTINUED | OUTPATIENT
Start: 2019-09-12 | End: 2019-09-15 | Stop reason: HOSPADM

## 2019-09-12 RX ORDER — METOPROLOL TARTRATE 50 MG
50 TABLET ORAL 2 TIMES DAILY
Status: DISCONTINUED | OUTPATIENT
Start: 2019-09-12 | End: 2019-09-15 | Stop reason: HOSPADM

## 2019-09-12 RX ORDER — ONDANSETRON 2 MG/ML
4 INJECTION INTRAMUSCULAR; INTRAVENOUS EVERY 6 HOURS PRN
Status: DISCONTINUED | OUTPATIENT
Start: 2019-09-12 | End: 2019-09-15 | Stop reason: HOSPADM

## 2019-09-12 RX ADMIN — PIPERACILLIN SODIUM AND TAZOBACTAM SODIUM 3.38 G: 3; .375 INJECTION, POWDER, LYOPHILIZED, FOR SOLUTION INTRAVENOUS at 02:47

## 2019-09-12 RX ADMIN — SODIUM CHLORIDE, POTASSIUM CHLORIDE, SODIUM LACTATE AND CALCIUM CHLORIDE 250 ML: 600; 310; 30; 20 INJECTION, SOLUTION INTRAVENOUS at 19:22

## 2019-09-12 RX ADMIN — OXYCODONE HYDROCHLORIDE 5 MG: 5 TABLET ORAL at 08:03

## 2019-09-12 RX ADMIN — PIPERACILLIN SODIUM AND TAZOBACTAM SODIUM 3.38 G: 3; .375 INJECTION, POWDER, LYOPHILIZED, FOR SOLUTION INTRAVENOUS at 20:16

## 2019-09-12 RX ADMIN — TACROLIMUS 1.5 MG: 1 CAPSULE ORAL at 19:22

## 2019-09-12 RX ADMIN — SENNOSIDES AND DOCUSATE SODIUM 2 TABLET: 8.6; 5 TABLET ORAL at 20:16

## 2019-09-12 RX ADMIN — Medication 1 TABLET: at 08:04

## 2019-09-12 RX ADMIN — FERROUS SULFATE TAB 325 MG (65 MG ELEMENTAL FE) 325 MG: 325 (65 FE) TAB at 08:03

## 2019-09-12 RX ADMIN — PIPERACILLIN SODIUM AND TAZOBACTAM SODIUM 3.38 G: 3; .375 INJECTION, POWDER, LYOPHILIZED, FOR SOLUTION INTRAVENOUS at 14:08

## 2019-09-12 RX ADMIN — PREDNISONE 2.5 MG: 2.5 TABLET ORAL at 20:16

## 2019-09-12 RX ADMIN — PIPERACILLIN SODIUM AND TAZOBACTAM SODIUM 3.38 G: 3; .375 INJECTION, POWDER, LYOPHILIZED, FOR SOLUTION INTRAVENOUS at 08:07

## 2019-09-12 RX ADMIN — SULFAMETHOXAZOLE AND TRIMETHOPRIM 1 TABLET: 400; 80 TABLET ORAL at 08:03

## 2019-09-12 RX ADMIN — GADOBUTROL 7.5 ML: 604.72 INJECTION INTRAVENOUS at 16:54

## 2019-09-12 RX ADMIN — Medication 400 MG: at 20:16

## 2019-09-12 RX ADMIN — PANTOPRAZOLE SODIUM 40 MG: 40 TABLET, DELAYED RELEASE ORAL at 08:03

## 2019-09-12 RX ADMIN — IOPAMIDOL 59 ML: 755 INJECTION, SOLUTION INTRAVENOUS at 18:35

## 2019-09-12 RX ADMIN — METOPROLOL TARTRATE 50 MG: 50 TABLET ORAL at 08:03

## 2019-09-12 RX ADMIN — SENNOSIDES AND DOCUSATE SODIUM 1 TABLET: 8.6; 5 TABLET ORAL at 11:19

## 2019-09-12 RX ADMIN — PANTOPRAZOLE SODIUM 40 MG: 40 TABLET, DELAYED RELEASE ORAL at 20:17

## 2019-09-12 RX ADMIN — METOPROLOL TARTRATE 50 MG: 50 TABLET ORAL at 20:17

## 2019-09-12 RX ADMIN — Medication 400 MG: at 08:03

## 2019-09-12 RX ADMIN — DOCUSATE SODIUM 286 ML: 50 LIQUID ORAL at 20:16

## 2019-09-12 RX ADMIN — POLYETHYLENE GLYCOL 3350 17 G: 17 POWDER, FOR SOLUTION ORAL at 20:16

## 2019-09-12 RX ADMIN — AMLODIPINE BESYLATE 10 MG: 10 TABLET ORAL at 08:03

## 2019-09-12 RX ADMIN — MULTIPLE VITAMINS W/ MINERALS TAB 1 TABLET: TAB at 08:03

## 2019-09-12 RX ADMIN — ACETAMINOPHEN 650 MG: 325 TABLET, FILM COATED ORAL at 08:03

## 2019-09-12 RX ADMIN — TACROLIMUS 2 MG: 1 CAPSULE ORAL at 08:03

## 2019-09-12 RX ADMIN — PREDNISONE 5 MG: 5 TABLET ORAL at 08:03

## 2019-09-12 RX ADMIN — MAGNESIUM HYDROXIDE 30 ML: 400 SUSPENSION ORAL at 11:18

## 2019-09-12 ASSESSMENT — ACTIVITIES OF DAILY LIVING (ADL)
ADLS_ACUITY_SCORE: 11

## 2019-09-12 ASSESSMENT — ENCOUNTER SYMPTOMS
LIGHT-HEADEDNESS: 0
DIZZINESS: 0
SHORTNESS OF BREATH: 0
PALPITATIONS: 0
ABDOMINAL PAIN: 1
DIARRHEA: 0
BLOOD IN STOOL: 0
COLOR CHANGE: 0

## 2019-09-12 ASSESSMENT — MIFFLIN-ST. JEOR: SCORE: 1126.12

## 2019-09-12 NOTE — ED TRIAGE NOTES
From [place], abdominal pain. Hx of lung transplant. Sent for evaluation by surgical team. Gallbladder wall thickening and stones seen on non-con CT.

## 2019-09-12 NOTE — ED PROVIDER NOTES
[facility] ([facility])  9/11/19    History     Chief Complaint   Patient presents with     Abdominal Pain     HPI  Kecia Blue is a 58 year old female with PMH notable for lung transplant 2017 for ILD, anti-synthetase syndrome, dermatomyositis, pulmonary hypertension, chronic cough, seronegative RA, sent from ED in [facility] for surgical evaluation for possible cholecystitis.    Patient reports that she began having abdominal pain, mostly epigastric/LUQ earlier today (having had to some degree intermittently for months).  Because of this pain, she contacted her transplant team who recommended she be seen at the nearest emergency department.  She was seen then in [place], [place] where she was on vacation (normally lives in [place]). While at that ED was found to have:   - Somewhat abnormal UA (21 WBCs, 1 RBC, trace LE, negative nitrites)  - Cr 1.5 (up from 1.28 at our facility previously)  - No acute findings on LFTs, baseline Na, K, and lipase normal.   - No leukocytosis, Hgb was 8.6 (recent range has been in the 8-9).     She also had abdominal x-rays and CTs (See impressions below). On the x-ray there is concern for possible ileus versus early SBO. CT showed concern for possible cholecystitis with cholelithiasis and GB wall thickening, as well is some mild mesenteric fat stranding. The bowel on the CT scan was reportedly not dilated though there was a moderate colonic stool burden in the ascending colon. At that point in the ED patient's ED course they were going to be getting an abdominal ultrasound and talking to surgery and so family along with their care providers there decided that they would want to further pursue care at our facility, ED provider spoke with one of our physicians and patient came here for further evaluation management, surgical consult, etc.    Patient reports she still having some ongoing pain particularly in the LUQ. She was given morphine at  the outside ED with good improvement and is just recently been recurring as the pain medication wore off.  Patient now reports that she is having a left upper quadrant discomfort for months, present intermittently.  She was told in the past that is likely related to constipation.  Started on a robust bowel regimen previously and as her BMs improved/normalized the pain would resolve.  That said she has had some ongoing issues with such as needed to continue bowel regimens.    Over the course of this couple months than she has had no occasional appetite but no vomiting or notable nausea.  This poor appetite has been worse today.  Some slow BMs, but no particular changes, no diarrhea, bloody or melenic stools. No urine symptoms. No change in her baseline chronic cough and no shortness of breath.  No new sputum production or hemoptysis. No fevers or chills (though chronically feels somewhat chilled, no change from baseline). No chest pain, palpitations, lightheadedness, dizziness, syncope or near syncope.  No rashes or skin changes, no traumas or falls. No other new symptoms or complaints at this time. Please see ROS for further details.    CT Abdomen and Pelvis - [facility] (9/11/2019)  1. Cholelithiasis with abnormal gallbladder wall thickening/edema. Recommend a follow-up right upper quadrant abdominal ultrasound.  2. Small to moderate pelvic free fluid.  3. Nonspecific mild mesenteric fat stranding.  4. Small bilateral pleural effusions.    XR Abdomen - [facility] (9/11/2019)  1. Borderline cardiomegaly with poststernotomy changes.  2. Mild prominence of the pulmonary vasculature without overt signs of CHF.  3. Air-filled loops of small bowel with scattered air-fluid levels. The appearance is most suggestive of ileus, though early small bowel obstruction could give a similar appearance.      I have reviewed the Medications, Allergies, Past Medical and Surgical History, and Social History in the Epic  system.    Past Medical History:   Diagnosis Date     Antisynthetase syndrome (H) 2014     Chronic cough      Chronic infection     recent C diff  8/18     Dehydration 8/1/2018     Dermatomyositis (H)      Dysplasia of cervix, low grade (ESTRADA 1)      ILD (interstitial lung disease) (H)      Osteopenia      PONV (postoperative nausea and vomiting)      Pulmonary hypertension (H)      Raynaud's disease      Seronegative rheumatoid arthritis (H)        Past Surgical History:   Procedure Laterality Date     BRONCHOSCOPY (RIGID OR FLEXIBLE), DIAGNOSTIC N/A 4/10/2018    Procedure: COMBINED BRONCHOSCOPY (RIGID OR FLEXIBLE), LAVAGE;;  Surgeon: Mariposa Donohue MD;  Location: [facility]     BRONCHOSCOPY (RIGID OR FLEXIBLE), DILATE BRONCHUS / TRACHEA N/A 10/11/2018    Procedure: BRONCHOSCOPY (RIGID OR FLEXIBLE), DILATE BRONCHUS / TRACHEA;  Flexible And Rigid Bronchoscopy and Dilation;  Surgeon: Wilber Lin MD;  [facility]     BRONCHOSCOPY FLEXIBLE N/A 3/13/2018    Procedure: BRONCHOSCOPY FLEXIBLE;  Flexible Bronchoscopy ;  Surgeon: Gissell Sanchez MD;  [facility]: [facility]     BRONCHOSCOPY FLEXIBLE N/A 5/9/2018    Procedure: BRONCHOSCOPY FLEXIBLE;;  Surgeon: Wilber Lin MD;  [facility]     BRONCHOSCOPY FLEXIBLE AND RIGID N/A 9/10/2018    Procedure: BRONCHOSCOPY FLEXIBLE AND RIGID;  Flexible and Rigid Bronchoscopy with Balloon Dilation, tissue debulking with cryo, and Right mainstem bronchus stent placement;  Surgeon: Wilber Lin MD;  [facility]     BRONCHOSCOPY RIGID N/A 6/6/2018    Procedure: BRONCHOSCOPY RIGID;;  Surgeon: Lopez Macias MD;  [facility]     BRONCHOSCOPY, DILATE BRONCHUS, STENT BRONCHUS, COMBINED N/A 6/11/2018    Procedure: COMBINED BRONCHOSCOPY, DILATE BRONCHUS, STENT BRONCHUS;  Flexible Bronchoscopy, Balloon Dilation, Bronchial Washings;  Surgeon: Wilber Lin MD;  [facility]     BRONCHOSCOPY, DILATE BRONCHUS, STENT BRONCHUS,  COMBINED Right 7/10/2018    Procedure: COMBINED BRONCHOSCOPY, DILATE BRONCHUS, STENT BRONCHUS;  Flexible Bronchoscopy, Balloon Dilation, Bronchial Washings  ;  Surgeon: Wilber Lin MD;  [facility]     BRONCHOSCOPY, DILATE BRONCHUS, STENT BRONCHUS, COMBINED N/A 8/2/2018    Procedure: COMBINED BRONCHOSCOPY, DILATE BRONCHUS, STENT BRONCHUS;  Flexible Bronchoscopy, Bronchial Washings, Balloon Dilation;  Surgeon: Wilber Lin MD;  [facility]     BRONCHOSCOPY, DILATE BRONCHUS, STENT BRONCHUS, COMBINED N/A 8/20/2018    Procedure: COMBINED BRONCHOSCOPY, DILATE BRONCHUS, STENT BRONCHUS;  Flexible Bronchoscopy, Balloon Dilation;  Surgeon: Wilber Lin MD;  [facility]     BRONCHOSCOPY, DILATE BRONCHUS, STENT BRONCHUS, COMBINED N/A 10/29/2018    Procedure: Flexible Bronchoscopy, Balloon Dilation, Stent Revision, Airway Examination And Therapeutic Suctioning, Cyro Tumor Debulking;  Surgeon: Wilber Lin MD;  [facility]     BRONCHOSCOPY, DILATE BRONCHUS, STENT BRONCHUS, COMBINED N/A 11/20/2018    Procedure: Rigid Bronchoscopy, Stent Removal and dilitation;  Surgeon: Wilber Lni MD;  [facility]     BRONCHOSCOPY, DILATE BRONCHUS, STENT BRONCHUS, COMBINED N/A 12/14/2018    Procedure: Flexible And Rigid Bronchoscopy, Balloon Dilation, Bronchial Washing;  Surgeon: Wilber Lin MD;  [facility]     BRONCHOSCOPY, DILATE BRONCHUS, STENT BRONCHUS, COMBINED N/A 1/17/2019    Procedure: Flexible And Rigid Bronchoscopy, Balloon Dilation.;  Surgeon: Wilber Lin MD;  [facility]     BRONCHOSCOPY, DILATE BRONCHUS, STENT BRONCHUS, COMBINED N/A 3/7/2019    Procedure: Flexible and Rigid Bronchoscopy, Bronchial Washing, Balloon Dilation;  Surgeon: Wilber Lin MD;  [facility]     BRONCHOSCOPY, DILATE BRONCHUS, STENT BRONCHUS, COMBINED N/A 6/6/2019    Procedure: Rigid and Flexible Bronchoscopy, Balloon Dilation;  Surgeon: Delia  Wilber Warner MD;  [facility]     ENT SURGERY      tonsillectomy as a child     ESOPHAGOSCOPY, GASTROSCOPY, DUODENOSCOPY (EGD), COMBINED N/A 10/29/2018    Procedure: COMBINED ESOPHAGOSCOPY, GASTROSCOPY, DUODENOSCOPY (EGD) with biopsies and polypectomy;  Surgeon: Chente Bloom MD;  [facility]     INSERT EXTRACORPORAL MEMBRANE OXYGENATOR ADULT (OUTSIDE OR) N/A 2/27/2018    Procedure: INSERT EXTRACORPORAL MEMBRANE OXYGENATOR ADULT (OUTSIDE OR);  INSERT EXTRACORPORAL MEMBRANE OXYGENATOR ADULT (OUTSIDE OR) ;  Surgeon: Toby Hernandez MD;  [facility]     no prior surgery       REMOVE EXTRACORPORAL MEMBRANE OXYGENATOR ADULT N/A 3/3/2018    Procedure: REMOVE EXTRACORPORAL MEMBRANE OXYGENATOR ADULT;  Removal of Right Femoral Venous and Right Axillary Arterial Extracorporeal Membrane Oxygenator;  Surgeon: Toby Hernandez MD;  [facility]     TRANSPLANT LUNG RECIPIENT SINGLE X2 Bilateral 3/1/2018    Procedure: TRANSPLANT LUNG RECIPIENT SINGLE X2;  Median Sternotomy, Extracorporeal Membrane Oxygenator, bilateral sequential lung transplant;  Surgeon: Toby Hernandez MD;  [facility]       Family History   Problem Relation Age of Onset     Hypertension Mother      Arthritis Mother      Pancreatic Cancer Father      Diabetes Father        Social History     Tobacco Use     Smoking status: Never Smoker     Smokeless tobacco: Never Used   Substance Use Topics     Alcohol use: No     Alcohol/week: 0.6 oz     Types: 1 Glasses of wine per week         Review of Systems   Constitutional: Positive for chills (chronic, unchanged). Negative for diaphoresis, fatigue and fever.   Respiratory: Positive for cough (chronic, unchanged). Negative for shortness of breath.    Cardiovascular: Negative for chest pain and palpitations.   Gastrointestinal: Positive for abdominal pain (epigastric/LUQ) and constipation. Negative for blood in stool, diarrhea, nausea and vomiting.  "  Genitourinary: Negative.  Negative for dysuria, frequency and hematuria.   Musculoskeletal: Negative for back pain, neck pain and neck stiffness.   Skin: Negative for color change and rash.   Allergic/Immunologic: Positive for immunocompromised state.   Neurological: Negative for dizziness, syncope, weakness and light-headedness.   Psychiatric/Behavioral: Negative.    All other systems reviewed and are negative.      Physical Exam   BP: (!) 161/89  Pulse: 89  Temp: 98  F (36.7  C)  Resp: 16  Height: 162.6 cm (5' 4\")  Weight: 54.4 kg (120 lb)  SpO2: 100 %      Physical Exam  CONSTITUTIONAL: Well-developed and well-nourished. Awake and alert. Non-toxic appearance. No acute distress.   HENT:   - Head: Normocephalic and atraumatic.   - Ears: Hearing and external ear grossly normal.   - Nose: Nose normal. No rhinorrhea. No epistaxis.   - Mouth/Throat: MMM  EYES: Conjunctivae and lids are normal. No scleral icterus.   NECK: Normal range of motion and phonation normal. Neck supple.  No tracheal deviation, no stridor. No edema or erythema noted.  CARDIOVASCULAR: Normal rate, regular rhythm and no appreciable abnormal heart sounds.  PULMONARY/CHEST: Normal work of breathing. No accessory muscle usage or stridor. No respiratory distress.  No appreciable abnormal breath sounds.  ABDOMEN: Soft, perhaps very mildly distended but certainly not tense. She has discomfort to palpation in the epigastric and LUQ but no palpable masses, no abnormal pulsatility. No peritoneal findings, no rigidity, rebound or guarding.    MUSCULOSKELETAL: Extremities warm and seemingly well perfused. No edema or calf tenderness.  NEUROLOGIC: Awake, alert. Not disoriented. She displays no atrophy and no tremor. Normal tone. No seizure activity. GCS 15  SKIN: Skin is warm and dry. No rash noted. No diaphoresis. No pallor.   PSYCHIATRIC: Normal mood and affect. Speech and behavior normal. Thought processes linear. Cognition and memory are normal. "         Assessments & Plan (with Medical Decision Making)   IMPRESSION: 58 year old female w/ PMH notable with PMH notable for lung transplant 2017 for ILD, anti-synthetase syndrome, dermatomyositis, pulmonary hypertension, chronic cough, seronegative RA, sent from ED in [place], [place] presenting for further evaluation management after having abnormal abdominal CT with concern for gallbladder etiology at OSH ED, with chief symptoms of epigastric/LUQ abdominal pain as described further above in HPI/ROS.    Clinically, patient appears nontoxic, NAD. She has some hypertension on arrival, but has not has not had her evening antihypertensive and not significantly changed from her usual baseline. Otherwise vitals unremarkable. On examination she has some epigastric and LUQ abdominal discomfort to palpation but no lester peritoneal findings, only just a mild amount of possible distention or bowel gas but not an acute/emergent appearing abdominal exam.    DDX includes, but not limited to, a hepatobiliary cause though I think this to be somewhat unlikely for a hepatobiliary cause to be causing current sx's, as her pain is more so left-sided. Other things to consider would be gastritis P/PUD, pyelonephritis, pancreatitis, ileus, bowel obstruction, amongst others.etc. Also considered low thoracic causes, patient has had previous lung transplant, but her cough is unchanged from baseline, and otherwise no new respiratory symptoms. Does not sound consistent w/ ACS, dissection, PE, etc. Will attempt to get the images pushed from the OSH ED to evaluate further.     PLAN:  Laboratory studies, urine studies, abdominal ultrasound to better evaluate the hepatobiliary area, will try to have images pushed to evaluate further, discuss with surgery to see if there is another potential cause of her symptoms. Symptom management as needed.     RESULTS:  - Labs: No leukocytosis, Hgb 9.1, Cr 1.28 (down from previous at OSH today), lactate  normal, CRP 66    INTERVENTIONS:   - IV fluid  - IV morphine  - Surgery Consult  - IV Zosyn    RE-EVALUATION:  - The patient's symptoms were nicely improved here in the ED  - Pt otherwise continues to do well here in the ED, no acute issues or apparent concerning changes in vitals or clinical appearance.    DISCUSSIONS:  - w/ Surgery:  They have seen evaluated the patient here in the ED. Recommend admit to IM for GI consult, management, Abx, and they'll continue to follow and assist, work w/ GI, etc.   - w/ Patient: I have reviewed the available findings, plan, with the patient and her loved one. They expressed understanding and agreement with this plan. All questions answered to the best of our ability at this time.     DISPOSITION/PLANNING:  - IMPRESSION: Acute cholecystitis w/ gallstones, dilated  bile ducts, LUQ abdominal pain  - DISPOSITION: Admit to IM for further evaluation/management  - PENDING: Cultures  - RECOMMENDATIONS: GI and Surgery consults, ERCP, get images from OSH CT scan or consider repeating CT scan, Pulm/Transplant consult    Patient signed out to overnight EM physician should patient have any needs while still in ED.       ______________________________________________________________________                9/11/2019   [facility], [place], EMERGENCY DEPARTMENT     Olivia Hines MD  09/13/19 [id number]

## 2019-09-12 NOTE — CONSULTS
General Surgery Consultation    Kecia Blue  MRN#: [id number]    Date of Admission:  9/11/2019    Date of Consult: 9/12/2019    Reason for consult: Abdominal pain       Requesting service: Emergency Dept       Requesting provider: Dr. Hines                   Assessment and Plan:   Assessment:   Kecia Blue is a 58-year-old female with a past medical history of dermatomyositis, RA, ILD  s/p bilateral lung transplant on 3/1/18, who presents with left-sided intermittent abdominal pain that started 2 months ago. History and exam not consistent with cholecystitis or biliary colic, however, US findings demonstrated a thickened gallbladder wall with a stone and extrahepatic bile duct dilation to 11mm. Given unclear timing of symptoms, will need further evaluation.      Plan:   - Admit to Medicine   - Recommend GI consult for possible ERCP  - Recommend Zosyn given immunosuppressed state  - Obtain OSH CT scan   - Keep NPO   - General surgery will further evaluate timing of possible surgical intervention pending GI findings and review of CT scan    Discussed with Chief, Dr. Nicanor Mattson, MS3  and  Sarah Sandoval,   General Surgery, PGY2  [place] Pager: [phone]              Chief Complaint:   Abdominal pain         History of Present Illness:   Kecia Blue is a 58 year old female with a h/o dermatomyositis, seronegative RA, ILD and pulmonary hypertension s/p bilateral lung transplant on 3/1/18. Post operative course complicated by right main bronchial stenosis, positive DSAs, C difficile and CMV viremia. She initially presented in [place] with abdominal pain with workup concerning for acute cholecystitis. Patient elected to be further evaluated at [facility].     She presented to the ED with left upper quadrant abdominal pain. The patient reports that her abdominal pain symptoms started 2 months ago with intermittent aching pain on the left upper quadrant with no radiating symptoms. She has had  more constant pain for the last 2 weeks with 5-6/10 severity and aching. Over the last 2 days, the pain has been 10/10, no longer controlled with heat packs and Tylenol.     The pain is worse when lying on her left side. The pain is not associated with eating and she denies nausea/vomiting. She describes having a poor appetite, but that this is her baseline since before her lung transplant in 2018.     Her last BM was 1 week ago. She usually has BMs at least every other day, but has some intermittent constipation, for which she uses Miralax at home and usually helps. She tried taking Miralax yesterday but with no improvement. She is passing flatus.     She denies chest pain, difficulty breathing, dysuria, and fever. She denies right-sided abdominal pain.     The ED workup at OSH in [place] showed x-ray with distended bowel loops and CT concerning for gallbladder thickening and stones. ED workup here includes labs significant for elevated CRP, Alk phos, however normal white count, T. Bili, LFTs and lipase. US is concerning for acute vs chronic cholecystitis and choledocholithiasis.           Past Medical History:     Past Medical History:   Diagnosis Date     Antisynthetase syndrome (H) 2014     Chronic cough      Chronic infection     recent C diff  8/18     Dehydration 8/1/2018     Dermatomyositis (H)      Dysplasia of cervix, low grade (ESTRADA 1)      ILD (interstitial lung disease) (H)      Osteopenia      PONV (postoperative nausea and vomiting)      Pulmonary hypertension (H)      Raynaud's disease      Seronegative rheumatoid arthritis (H)              Past Surgical History:     Past Surgical History:   Procedure Laterality Date     BRONCHOSCOPY (RIGID OR FLEXIBLE), DIAGNOSTIC N/A 4/10/2018    Procedure: COMBINED BRONCHOSCOPY (RIGID OR FLEXIBLE), LAVAGE;;  Surgeon: Mariposa Donohue MD;  Location: [facility]     BRONCHOSCOPY (RIGID OR FLEXIBLE), DILATE BRONCHUS / TRACHEA N/A 10/11/2018    Procedure: BRONCHOSCOPY  (RIGID OR FLEXIBLE), DILATE BRONCHUS / TRACHEA;  Flexible And Rigid Bronchoscopy and Dilation;  Surgeon: Wilber Lin MD;  Location: [facility]     BRONCHOSCOPY FLEXIBLE N/A 3/13/2018    Procedure: BRONCHOSCOPY FLEXIBLE;  Flexible Bronchoscopy ;  Surgeon: Gissell Sanchez MD;  Location: [facility]     BRONCHOSCOPY FLEXIBLE N/A 5/9/2018    Procedure: BRONCHOSCOPY FLEXIBLE;;  Surgeon: Wilber Lin MD;  Location: [facility]     BRONCHOSCOPY FLEXIBLE AND RIGID N/A 9/10/2018    Procedure: BRONCHOSCOPY FLEXIBLE AND RIGID;  Flexible and Rigid Bronchoscopy with Balloon Dilation, tissue debulking with cryo, and Right mainstem bronchus stent placement;  Surgeon: Wilber Lin MD;  Location[facility]     BRONCHOSCOPY RIGID N/A 6/6/2018    Procedure: BRONCHOSCOPY RIGID;;  Surgeon: Lopez Macias MD;  Location: [facility]     BRONCHOSCOPY, DILATE BRONCHUS, STENT BRONCHUS, COMBINED N/A 6/11/2018    Procedure: COMBINED BRONCHOSCOPY, DILATE BRONCHUS, STENT BRONCHUS;  Flexible Bronchoscopy, Balloon Dilation, Bronchial Washings;  Surgeon: Wilber Lin MD;  [facility]     BRONCHOSCOPY, DILATE BRONCHUS, STENT BRONCHUS, COMBINED Right 7/10/2018    Procedure: COMBINED BRONCHOSCOPY, DILATE BRONCHUS, STENT BRONCHUS;  Flexible Bronchoscopy, Balloon Dilation, Bronchial Washings  ;  Surgeon: Wilber Lin MD;  [facility]     BRONCHOSCOPY, DILATE BRONCHUS, STENT BRONCHUS, COMBINED N/A 8/2/2018    Procedure: COMBINED BRONCHOSCOPY, DILATE BRONCHUS, STENT BRONCHUS;  Flexible Bronchoscopy, Bronchial Washings, Balloon Dilation;  Surgeon: Wilber Lin MD;  [facility]     BRONCHOSCOPY, DILATE BRONCHUS, STENT BRONCHUS, COMBINED N/A 8/20/2018    Procedure: COMBINED BRONCHOSCOPY, DILATE BRONCHUS, STENT BRONCHUS;  Flexible Bronchoscopy, Balloon Dilation;  Surgeon: Wilber Lin MD;  [facility]     BRONCHOSCOPY, DILATE BRONCHUS, STENT BRONCHUS, COMBINED N/A 10/29/2018     Procedure: Flexible Bronchoscopy, Balloon Dilation, Stent Revision, Airway Examination And Therapeutic Suctioning, Cyro Tumor Debulking;  Surgeon: Wilber Lin MD;  [facility]     BRONCHOSCOPY, DILATE BRONCHUS, STENT BRONCHUS, COMBINED N/A 11/20/2018    Procedure: Rigid Bronchoscopy, Stent Removal and dilitation;  Surgeon: Wilber Lin MD;  [facility]     BRONCHOSCOPY, DILATE BRONCHUS, STENT BRONCHUS, COMBINED N/A 12/14/2018    Procedure: Flexible And Rigid Bronchoscopy, Balloon Dilation, Bronchial Washing;  Surgeon: Wilber Lin MD;  [facility]     BRONCHOSCOPY, DILATE BRONCHUS, STENT BRONCHUS, COMBINED N/A 1/17/2019    Procedure: Flexible And Rigid Bronchoscopy, Balloon Dilation.;  Surgeon: Wilber Lin MD;  [facility]     BRONCHOSCOPY, DILATE BRONCHUS, STENT BRONCHUS, COMBINED N/A 3/7/2019    Procedure: Flexible and Rigid Bronchoscopy, Bronchial Washing, Balloon Dilation;  Surgeon: Wilber Lin MD;  [facility]     BRONCHOSCOPY, DILATE BRONCHUS, STENT BRONCHUS, COMBINED N/A 6/6/2019    Procedure: Rigid and Flexible Bronchoscopy, Balloon Dilation;  Surgeon: Wilber Lin MD;  Location[facility]     ENT SURGERY      tonsillectomy as a child     ESOPHAGOSCOPY, GASTROSCOPY, DUODENOSCOPY (EGD), COMBINED N/A 10/29/2018    Procedure: COMBINED ESOPHAGOSCOPY, GASTROSCOPY, DUODENOSCOPY (EGD) with biopsies and polypectomy;  Surgeon: Chente Bloom MD;  [facility]     INSERT EXTRACORPORAL MEMBRANE OXYGENATOR ADULT (OUTSIDE OR) N/A 2/27/2018    Procedure: INSERT EXTRACORPORAL MEMBRANE OXYGENATOR ADULT (OUTSIDE OR);  INSERT EXTRACORPORAL MEMBRANE OXYGENATOR ADULT (OUTSIDE OR) ;  Surgeon: Toby Hernandez MD;  Location: [facility]     no prior surgery       REMOVE EXTRACORPORAL MEMBRANE OXYGENATOR ADULT N/A 3/3/2018    Procedure: REMOVE EXTRACORPORAL MEMBRANE OXYGENATOR ADULT;  Removal of Right Femoral Venous and Right Axillary  Arterial Extracorporeal Membrane Oxygenator;  Surgeon: Toby Hernandez MD;  Location: [facility]     TRANSPLANT LUNG RECIPIENT SINGLE X2 Bilateral 3/1/2018    Procedure: TRANSPLANT LUNG RECIPIENT SINGLE X2;  Median Sternotomy, Extracorporeal Membrane Oxygenator, bilateral sequential lung transplant;  Surgeon: Toby Hernandez MD;  [facility]             Social History:   Tobacco: Does not smoke cigarettes  EtOH: Drinks infrequently, once a month    Social History     Tobacco Use     Smoking status: Never Smoker     Smokeless tobacco: Never Used   Substance Use Topics     Alcohol use: No     Alcohol/week: 0.6 oz     Types: 1 Glasses of wine per week             Family History:     Negative for bleeding disorders, clotting disorders, or problems with anesthesia.    Family History   Problem Relation Age of Onset     Hypertension Mother      Arthritis Mother      Pancreatic Cancer Father      Diabetes Father                 Allergies:   No Known Allergies          Medications:       No current facility-administered medications on file prior to encounter.   Current Outpatient Medications on File Prior to Encounter:  ACETAMINOPHEN PO Take 1,000 mg by mouth every 6 hours as needed for pain   amLODIPine (NORVASC) 10 MG tablet Take 1 tablet (10 mg) by mouth daily   calcium-vitamin D (CALTRATE) 600-400 MG-UNIT per tablet Take 1 tablet by mouth daily   docusate sodium (COLACE) 50 MG capsule Take 2 capsules (100 mg) by mouth 2 times daily as needed for constipation   [] dronabinol (MARINOL) 5 MG capsule Take 1 capsule (5 mg) by mouth 2 times daily (before meals)   ferrous sulfate (FEROSUL) 325 (65 Fe) MG tablet Take 1 tablet (325 mg) by mouth daily (with breakfast)   magnesium oxide (MAG-OX) 400 MG tablet Take 1 tablet (400 mg) by mouth 2 times daily   metoprolol tartrate (LOPRESSOR) 25 MG tablet Take 2 tablets (50 mg) by mouth 2 times daily   multivitamin, therapeutic with minerals (THERA-VIT-M)  TABS tablet Take 1 tablet by mouth daily   ondansetron (ZOFRAN) 4 MG tablet Take 1 tablet (4 mg) by mouth every 12 hours as needed for nausea   order for DME Equipment being ordered: Nebulizer   order for DME Equipment being ordered: Nebulizer   pantoprazole (PROTONIX) 40 MG EC tablet Take 1 tablet (40 mg) by mouth 2 times daily   predniSONE (DELTASONE) 2.5 MG tablet Take two tablets (5mg) every AM and one tablet (2.5mg) every evening   sulfamethoxazole-trimethoprim (BACTRIM/SEPTRA) 400-80 MG tablet Take 1 tablet by mouth daily   tacrolimus (GENERIC EQUIVALENT) 0.5 MG capsule Take 1 capsule (0.5 mg) by mouth every evening Total daily dose of 2mg in AM and 1.5mg in PM   tacrolimus (GENERIC EQUIVALENT) 1 MG capsule Take two (1mg) caps ever AM and one (1mg) cap every PM. Total dose is 2mg in morning and 1.5mg every evening             Review of Systems:   10-point ROS otherwise negative except as noted above.          Physical Exam:     Temp:  [98  F (36.7  C)-98.1  F (36.7  C)] 98.1  F (36.7  C)  Pulse:  [89] 89  Resp:  [16-18] 18  BP: (160-161)/() 160/108  SpO2:  [98 %-100 %] 98 %     General: AAOx4, NAD, lying comfortably in bed  CV: regular rate and rhythm, warm, well-perfused  Pulm: no dyspnea, breathing comfortably on RA. Lungs clear to auscultation.   Abd: soft, non-tender, mild distention. Tympanic on percussion. Negative Swanson's sign.   Extremities: no edema  Neuro: moving all extremities spontaneously without apparent deficit    No intake/output data recorded.          Data:   Labs:  Arterial Blood Gases   No lab results found in last 7 days.     Complete Blood Count   Recent Labs   Lab 09/11/19  2325   WBC 6.1   HGB 9.1*          Basic Metabolic Panel  Recent Labs   Lab 09/11/19  2325      POTASSIUM 4.2   CHLORIDE 100   CO2 22   BUN 24   CR 1.28*   GLC 76   CHELSEY 9.2       Liver Function Tests  Recent Labs   Lab 09/11/19  2325   AST 29   ALT 17   ALKPHOS 255*   BILITOTAL 0.4   ALBUMIN 2.8*        Pancreatic Enzymes  Recent Labs   Lab 09/11/19  2325   LIPASE 127       Coagulation Profile  Recent Labs   Lab 09/11/19  2325   INR 1.06       Lactate  Recent Labs   Lab 09/11/19  2325   LACT 1.0       Imaging:   Results for orders placed or performed during the hospital encounter of 09/11/19   US Abdomen Limited (RUQ)    Narrative    EXAMINATION: Limited Abdominal Ultrasound, 9/11/2019 11:50 PM     COMPARISON: CT abdomen 8/20/2018    HISTORY: Transfer from outside hospital for management of possible  acute cholecystitis    FINDINGS:   Fluid: No evidence of ascites or pleural effusions.    Liver: The liver demonstrates normal echotexture, measuring 18.9 cm in  craniocaudal dimension. There is no focal mass.     Gallbladder: The gallbladder wall is thickened to 4 mm, however is not  significantly hyperemic on color Doppler images. Trace pericholecystic  edema Biliary sludge and cholelithiasis, including stone at the  gallbladder neck.    Bile ducts: The proximal extra hepatic bile duct is dilated to 11 mm,  increased from 8/20/2018. No intrahepatic biliary ductal dilatation.    Pancreas: Obscured by shadowing bowel gas.    Kidney: The right kidney measures 9.0 cm long. There is no  hydronephrosis or hydroureter, no shadowing renal calculi, cystic  lesion or mass.       Impression    IMPRESSION:   1.  Findings concerning for acute cholecystitis, including gallbladder  wall thickening, pericholecystic edema, and multiple stones at the  gallbladder neck.  2.  Distention of the extrahepatic bile ducts to 11 mm, increased from  8/20/2018, possibly from distal choledocholithiasis (the distal ducts  are obscured) or recently passed stone.               Other Result Information   [facility], [facility] - 09/11/2019  2:27 PM CDT  EXAM: CT ABDOMEN-PELVIS WO CONT    CLINICIAN'S HISTORY: Oral contrast only  Abd pain, unspecified   -    HISTORY REPORTED TO TECHNOLOGIST:  Acute, left side pain pain, r/o  bowel obstruction,  worsening over last two weeks, surgical history  includes bilateral lung transplant in march of 2018, see xrays from  9-11-19, no prior ct     COMPARISON: None.    *Dose reduction techniques using the adjustment of the mA and/or kV  according to patient size and/or use of AEC or iterative  reconstruction were used in the acquisition of this exam.*    TECHNIQUE: Noncontrast axial CT images of the abdomen and pelvis  following the oral administration of contrast. Coronal and sagittal  reformatted images were obtained.    FINDINGS:  Images of the lung bases demonstrate small bilateral pleural effusions  with bibasilar atelectasis.    The liver has a homogeneous appearance. No focal hepatic lesions.  There is a 2 mm calcification within the gallbladder. Diffuse abnormal  wall thickening/edema of the gallbladder. The spleen, pancreas and  bilateral adrenal glands have an unremarkable nonenhanced appearance.  The kidneys are normal in size. No hydronephrosis or hydroureter. No  perinephric fat stranding.    Abdominal aorta is not aneurysmal.    No dilated loops of small or large bowel. Moderate colonic stool  burden within the ascending colon. There is a small to moderate amount  of pelvic free fluid. No pneumoperitoneum. Bowel wall thickening of  the descending and sigmoid colon likely related to underdistention.  Oral contrast is seen to the level of the mid small bowel.    No enlarged abdominal or pelvic lymph nodes. Nonspecific mild  mesenteric stranding.    No urinary bladder wall thickening.    No acute osseous abnormality.    IMPRESSION:  1. Cholelithiasis with abnormal gallbladder wall thickening/edema.  Recommend a follow-up right upper quadrant abdominal ultrasound.  2. Small to moderate pelvic free fluid.  3. Nonspecific mild mesenteric fat stranding.  4. Small bilateral pleural effusions.         Other Result Information   [facility] - 09/11/2019 10:40 AM CDT  EXAM: DX ABD ACUTE S-U + PA  CHEST    CLINICIAN'S HISTORY: abdominal pain   -    HISTORY REPORTED TO TECHNOLOGIST:  Generalized abdominal pain and  constipation x 2 weeks     COMPARISON: None.    TECHNIQUE: PA chest with supine and upright views of the abdomen and  pelvis.    FINDINGS: Heart is at the upper limits of normal for size with post  sternotomy changes. There is mild prominence of the pulmonary  vasculature. Surgical clips overlie the mediastinum and upper right  chest.    Air is present in a few loops of mildly prominent small bowel. Air is  present in nondilated colon. There are few scattered air-fluid levels.  The pattern is most suggestive of ileus. Early changes of small bowel  obstruction could give a similar appearance. No free air is  identified.    IMPRESSION:  1. Borderline cardiomegaly with poststernotomy changes.  2. Mild prominence of the pulmonary vasculature without overt signs of  CHF.  3. Air-filled loops of small bowel with scattered air-fluid levels.  The appearance is most suggestive of ileus, though early small bowel  obstruction could give a similar appearance.

## 2019-09-12 NOTE — H&P
[facility], [place]    History and Physical - [facility] Night Service        Date of Admission:  9/11/2019    Assessment & Plan   Kecia Blue is a 58 year old female admitted on 9/11/2019. She has a history of bilateral lung transplant (3/2018) for ILD, anti-synthetase syndrome, dermatomyositis, pulmonary hypertension, chronic cough, seronegative RA, and is admitted for cholecystitis with possible choledocholithiasis and surgical evaluation.    Abdominal pain concerning for cholecystitis and choledocholithiasis based on imaging  Abd US on admission c/w cholecystitis with possible choledocholithiasis given mild dilation of bile duct to 11mm. No signs of sepsis, afebrile, HD stable, no leukocytosis, though CRP elevated. Atypical L-sided pain given likely cholecystitis, though imaging quite conclusive. Regardless, pain resolved after morphine in ED, and do not currently suspect alternative process.    - Gen surg evaluated in ED, will need to obtain OSH CT  - GI consult for possible ERCP  - Continue Zosyn   - NPO for possible procedure  - mIVF D5 1/2NS 125ml/hr  - Zofran and compazine PRN  - Pain control: tylenol PRN, oxycodone 5mg PRN  - Follow BCx    Chronic Medical Problems  Hx of bilateral lung transplant: pulmonary transplant consult placed; cont pta prednisone, tacrolimus, bactrim  HTN: cont pta amlodipine and metoprolol  ?CKD: admission Cr similar to baseline, likely 2/2 tacrolimus  Dermatomyositis: no active treatments, asymptomatic since lung transplant  Seronegative RA: no active treatments, asymptomatic since lung transplant  Constipation: cont pta docusate, add miralax PRN       Diet: NPO  DVT Prophylaxis: Pneumatic Compression Devices  Basilio Catheter: not present  Code Status: Full    Disposition Plan   Expected discharge: 2 - 3 days, recommended to prior living arrangement once ruled out choledocholithiasis and antibiotic plan established.       The patient's care was  discussed with the Attending Physician, Dr. Shaw.    Dom Orellana MD, PhD  [facility], [place]  Pager: [phone]  Please see sticky note for cross cover information  ______________________________________________________________________    Chief Complaint   Abdominal pain    History is obtained from the patient and electronic health record    History of Present Illness   Kecia Blue is a 58 year old female who has a history of bilateral lung transplant (3/2018) for ILD, anti-synthetase syndrome, dermatomyositis, pulmonary hypertension, chronic cough, seronegative RA and is admitted for abdominal pain.    Ms. Blue has experienced LUQ abdominal pain for approximately 2-3 months now. The pain is intermittent, sharp, and without radiation. It occurs just below the rib cage. It has no relationship to food intake. She has noted an increase in the severity and frequency of the pain, particularly during the last week. She denies any associated N/V/D. She also denies fevers, chills, cough, dyspnea, or chest pain. She notes that she has had alternating diarrhea and constipation after her lung transplant. Recently over the last few weeks, she has suffered from constipation, but continues to have small bowel movements every one to two days. She denies blood in her stool.    She notes that her chronic cough and breathing are at baseline. She has had no complications recently with regard to her lung transplants. She has not had any rheumatologic flares since receiving her transplants. She does note chronic knee pain, though it is believed to be arthritic (due to cartilage loss from her chronic inflammatory diseases).     ED course:  Vitals significant for: afebrile, HR 80s, /160s/70-100s, sating well on RA  Labs significant for: Cr 1.28 (at baseline), lactate 1.0, CRP 66 (H), ESR (102), lipase 127 (wnl)  Imaging significant for: abdominal US concerning for  acute cholecystitis with distention of extrahepatic bile duct to 11 mm concerning for possible choledocholithiasis.   Interventions: morphine 4 mg IV x1, zosyn, LR mIVF.     Review of Systems    The 10 point Review of Systems is negative other than noted in the HPI or here.    Past Medical History    I have reviewed this patient's medical history and updated it with pertinent information if needed.   Past Medical History:   Diagnosis Date     Antisynthetase syndrome (H) 2014     Chronic cough      Chronic infection     recent C diff  8/18     Dehydration 8/1/2018     Dermatomyositis (H)      Dysplasia of cervix, low grade (ESTRADA 1)      ILD (interstitial lung disease) (H)      Osteopenia      PONV (postoperative nausea and vomiting)      Pulmonary hypertension (H)      Raynaud's disease      Seronegative rheumatoid arthritis (H)      Past Surgical History   I have reviewed this patient's surgical history and updated it with pertinent information if needed.  Past Surgical History:   Procedure Laterality Date     BRONCHOSCOPY (RIGID OR FLEXIBLE), DIAGNOSTIC N/A 4/10/2018    Procedure: COMBINED BRONCHOSCOPY (RIGID OR FLEXIBLE), LAVAGE;;  Surgeon: Mariposa Donohue MD;  Location: [facility]     BRONCHOSCOPY (RIGID OR FLEXIBLE), DILATE BRONCHUS / TRACHEA N/A 10/11/2018    Procedure: BRONCHOSCOPY (RIGID OR FLEXIBLE), DILATE BRONCHUS / TRACHEA;  Flexible And Rigid Bronchoscopy and Dilation;  Surgeon: Wilber Lin MD;  [facility]     BRONCHOSCOPY FLEXIBLE N/A 3/13/2018    Procedure: BRONCHOSCOPY FLEXIBLE;  Flexible Bronchoscopy ;  Surgeon: Gissell Sanchez MD;  Location: [facility]     BRONCHOSCOPY FLEXIBLE N/A 5/9/2018    Procedure: BRONCHOSCOPY FLEXIBLE;;  Surgeon: Wilber Lin MD;  Location: [facility]     BRONCHOSCOPY FLEXIBLE AND RIGID N/A 9/10/2018    Procedure: BRONCHOSCOPY FLEXIBLE AND RIGID;  Flexible and Rigid Bronchoscopy with Balloon Dilation, tissue debulking with cryo, and Right mainstem  bronchus stent placement;  Surgeon: Wilber Lin MD;  [facility]     BRONCHOSCOPY RIGID N/A 6/6/2018    Procedure: BRONCHOSCOPY RIGID;;  Surgeon: Lopez Macias MD;  [facility]: [facility]     BRONCHOSCOPY, DILATE BRONCHUS, STENT BRONCHUS, COMBINED N/A 6/11/2018    Procedure: COMBINED BRONCHOSCOPY, DILATE BRONCHUS, STENT BRONCHUS;  Flexible Bronchoscopy, Balloon Dilation, Bronchial Washings;  Surgeon: Wilber Lin MD;  [facility]     BRONCHOSCOPY, DILATE BRONCHUS, STENT BRONCHUS, COMBINED Right 7/10/2018    Procedure: COMBINED BRONCHOSCOPY, DILATE BRONCHUS, STENT BRONCHUS;  Flexible Bronchoscopy, Balloon Dilation, Bronchial Washings  ;  Surgeon: Wilber Lin MD;  [facility]     BRONCHOSCOPY, DILATE BRONCHUS, STENT BRONCHUS, COMBINED N/A 8/2/2018    Procedure: COMBINED BRONCHOSCOPY, DILATE BRONCHUS, STENT BRONCHUS;  Flexible Bronchoscopy, Bronchial Washings, Balloon Dilation;  Surgeon: Wilber Lin MD;  [facility]     BRONCHOSCOPY, DILATE BRONCHUS, STENT BRONCHUS, COMBINED N/A 8/20/2018    Procedure: COMBINED BRONCHOSCOPY, DILATE BRONCHUS, STENT BRONCHUS;  Flexible Bronchoscopy, Balloon Dilation;  Surgeon: Wilber Lin MD;  [facility]     BRONCHOSCOPY, DILATE BRONCHUS, STENT BRONCHUS, COMBINED N/A 10/29/2018    Procedure: Flexible Bronchoscopy, Balloon Dilation, Stent Revision, Airway Examination And Therapeutic Suctioning, Cyro Tumor Debulking;  Surgeon: Wilber Lin MD;  [facility]     BRONCHOSCOPY, DILATE BRONCHUS, STENT BRONCHUS, COMBINED N/A 11/20/2018    Procedure: Rigid Bronchoscopy, Stent Removal and dilitation;  Surgeon: Wilber Lin MD;  [facility]     BRONCHOSCOPY, DILATE BRONCHUS, STENT BRONCHUS, COMBINED N/A 12/14/2018    Procedure: Flexible And Rigid Bronchoscopy, Balloon Dilation, Bronchial Washing;  Surgeon: Wilber Lin MD;  [facility]     BRONCHOSCOPY, DILATE BRONCHUS, STENT  BRONCHUS, COMBINED N/A 1/17/2019    Procedure: Flexible And Rigid Bronchoscopy, Balloon Dilation.;  Surgeon: Wilber Lin MD;  [facility]     BRONCHOSCOPY, DILATE BRONCHUS, STENT BRONCHUS, COMBINED N/A 3/7/2019    Procedure: Flexible and Rigid Bronchoscopy, Bronchial Washing, Balloon Dilation;  Surgeon: Wilber Lin MD;  [facility]     BRONCHOSCOPY, DILATE BRONCHUS, STENT BRONCHUS, COMBINED N/A 6/6/2019    Procedure: Rigid and Flexible Bronchoscopy, Balloon Dilation;  Surgeon: Wilber Lin MD;  [facility]     ENT SURGERY      tonsillectomy as a child     ESOPHAGOSCOPY, GASTROSCOPY, DUODENOSCOPY (EGD), COMBINED N/A 10/29/2018    Procedure: COMBINED ESOPHAGOSCOPY, GASTROSCOPY, DUODENOSCOPY (EGD) with biopsies and polypectomy;  Surgeon: Chente Bloom MD;  [facility]     INSERT EXTRACORPORAL MEMBRANE OXYGENATOR ADULT (OUTSIDE OR) N/A 2/27/2018    Procedure: INSERT EXTRACORPORAL MEMBRANE OXYGENATOR ADULT (OUTSIDE OR);  INSERT EXTRACORPORAL MEMBRANE OXYGENATOR ADULT (OUTSIDE OR) ;  Surgeon: Toby Hernandez MD;  Location: [facility]     no prior surgery       REMOVE EXTRACORPORAL MEMBRANE OXYGENATOR ADULT N/A 3/3/2018    Procedure: REMOVE EXTRACORPORAL MEMBRANE OXYGENATOR ADULT;  Removal of Right Femoral Venous and Right Axillary Arterial Extracorporeal Membrane Oxygenator;  Surgeon: Toby Hernandez MD;  [facility]     TRANSPLANT LUNG RECIPIENT SINGLE X2 Bilateral 3/1/2018    Procedure: TRANSPLANT LUNG RECIPIENT SINGLE X2;  Median Sternotomy, Extracorporeal Membrane Oxygenator, bilateral sequential lung transplant;  Surgeon: Toby Hernandez MD;  Location[facility]      Social History   I have reviewed this patient's social history and updated it with pertinent information if needed. Kecia VILLAFANA [employer]  reports that she has never smoked. She has never used smokeless tobacco. She reports that she does not drink alcohol or use  drugs.    Family History   I have reviewed this patient's family history and updated it with pertinent information if needed.   Family History   Problem Relation Age of Onset     Hypertension Mother      Arthritis Mother      Pancreatic Cancer Father      Diabetes Father      Prior to Admission Medications   Prior to Admission Medications   Prescriptions Last Dose Informant Patient Reported? Taking?   ACETAMINOPHEN PO   Yes No   Sig: Take 1,000 mg by mouth every 6 hours as needed for pain   amLODIPine (NORVASC) 10 MG tablet   No No   Sig: Take 1 tablet (10 mg) by mouth daily   calcium-vitamin D (CALTRATE) 600-400 MG-UNIT per tablet   Yes No   Sig: Take 1 tablet by mouth daily   docusate sodium (COLACE) 50 MG capsule   No No   Sig: Take 2 capsules (100 mg) by mouth 2 times daily as needed for constipation   dronabinol (MARINOL) 5 MG capsule   No No   Sig: Take 1 capsule (5 mg) by mouth 2 times daily (before meals)   ferrous sulfate (FEROSUL) 325 (65 Fe) MG tablet   No No   Sig: Take 1 tablet (325 mg) by mouth daily (with breakfast)   magnesium oxide (MAG-OX) 400 MG tablet   No No   Sig: Take 1 tablet (400 mg) by mouth 2 times daily   metoprolol tartrate (LOPRESSOR) 25 MG tablet   No No   Sig: Take 2 tablets (50 mg) by mouth 2 times daily   multivitamin, therapeutic with minerals (THERA-VIT-M) TABS tablet   No No   Sig: Take 1 tablet by mouth daily   ondansetron (ZOFRAN) 4 MG tablet   No No   Sig: Take 1 tablet (4 mg) by mouth every 12 hours as needed for nausea   order for DME   No No   Sig: Equipment being ordered: Nebulizer   order for DME   No No   Sig: Equipment being ordered: Nebulizer   pantoprazole (PROTONIX) 40 MG EC tablet   No No   Sig: Take 1 tablet (40 mg) by mouth 2 times daily   predniSONE (DELTASONE) 2.5 MG tablet   No No   Sig: Take two tablets (5mg) every AM and one tablet (2.5mg) every evening   sulfamethoxazole-trimethoprim (BACTRIM/SEPTRA) 400-80 MG tablet   No No   Sig: Take 1 tablet by mouth  daily   tacrolimus (GENERIC EQUIVALENT) 0.5 MG capsule   No No   Sig: Take 1 capsule (0.5 mg) by mouth every evening Total daily dose of 2mg in AM and 1.5mg in PM   tacrolimus (GENERIC EQUIVALENT) 1 MG capsule   No No   Sig: Take two (1mg) caps ever AM and one (1mg) cap every PM. Total dose is 2mg in morning and 1.5mg every evening      Facility-Administered Medications: None     Allergies   No Known Allergies    Physical Exam   Vital Signs: Temp: 98.1  F (36.7  C) Temp src: Oral BP: (!) 160/86 Pulse: 89   Resp: 18 SpO2: 100 % O2 Device: None (Room air)    Weight: 120 lbs 0 oz    General: Well-appearing female, in no acute distress.  HEENT: NCAT. PERRLA, EOMI, anicteric sclera. Hearing grossly intact. No nasal congestion or erythema. MMM, no oral lesions.  Neck: Supple. No LAD. JVP wnl.  Chest: Sternotomy midline scar, well-healed  Lungs: Full and equal expansion. Breath sounds diminished at L base, but otherwise CTAB and without wheezing, rhonchi or rales.  CV: RRR. Normal S1, S2. No m/r/g.  Abd: NABS. Soft, non-distended. No tenderness to deep palpation in all quadrants. No rebound or guarding.    Extremities: Warm and well-perfused. No gross malformations. No joint swelling. No edema.  Skin: No rashes or lesions.  Neuro: AOx3, answers questions appropriately. Face symmetric. Moves extremities equally and independently.      Data   Data reviewed today: I reviewed all medications, new labs and imaging results over the last 24 hours.    Recent Labs   Lab 09/11/19  2325   WBC 6.1   HGB 9.1*   MCV 94      INR 1.06      POTASSIUM 4.2   CHLORIDE 100   CO2 22   BUN 24   CR 1.28*   ANIONGAP 11   CHELSEY 9.2   GLC 76   ALBUMIN 2.8*   PROTTOTAL 8.3   BILITOTAL 0.4   ALKPHOS 255*   ALT 17   AST 29   LIPASE 127     Recent Results (from the past 24 hour(s))   US Abdomen Limited (RUQ)    Narrative    EXAMINATION: Limited Abdominal Ultrasound, 9/11/2019 11:50 PM     COMPARISON: CT abdomen 8/20/2018    HISTORY: Transfer  from outside hospital for management of possible  acute cholecystitis    FINDINGS:   Fluid: No evidence of ascites or pleural effusions.    Liver: The liver demonstrates normal echotexture, measuring 18.9 cm in  craniocaudal dimension. There is no focal mass.     Gallbladder: The gallbladder wall is thickened to 4 mm, however is not  significantly hyperemic on color Doppler images. Trace pericholecystic  edema Biliary sludge and cholelithiasis, including stone at the  gallbladder neck.    Bile ducts: The proximal extra hepatic bile duct is dilated to 11 mm,  increased from 8/20/2018. No intrahepatic biliary ductal dilatation.    Pancreas: Obscured by shadowing bowel gas.    Kidney: The right kidney measures 9.0 cm long. There is no  hydronephrosis or hydroureter, no shadowing renal calculi, cystic  lesion or mass.       Impression    IMPRESSION:   1.  Findings concerning for acute cholecystitis, including gallbladder  wall thickening, pericholecystic edema, and multiple stones at the  gallbladder neck.  2.  Distention of the extrahepatic bile ducts to 11 mm, increased from  8/20/2018, possibly from distal choledocholithiasis (the distal ducts  are obscured) or recently passed stone.

## 2019-09-12 NOTE — ED NOTES
[facility], [place]   ED Nurse to Floor Handoff     Kecia Blue is a 58 year old female who speaks English and lives with a spouse,  in a home  They arrived in the ED by car from home    ED Chief Complaint: Abdominal Pain    ED Dx;   Final diagnoses:   Abdominal pain, epigastric         Needed?: No    Allergies: No Known Allergies.  Past Medical Hx:   Past Medical History:   Diagnosis Date     Antisynthetase syndrome (H) 2014     Chronic cough      Chronic infection     recent C diff  8/18     Dehydration 8/1/2018     Dermatomyositis (H)      Dysplasia of cervix, low grade (ESTRADA 1)      ILD (interstitial lung disease) (H)      Osteopenia      PONV (postoperative nausea and vomiting)      Pulmonary hypertension (H)      Raynaud's disease      Seronegative rheumatoid arthritis (H)       Baseline Mental status: WDL  Current Mental Status changes: at basesline    Infection present or suspected this encounter: no  Sepsis suspected: No  Isolation type: No active isolations     Activity level - Baseline/Home:  Independent  Activity Level - Current:   Independent    Bariatric equipment needed?: No    In the ED these meds were given:   Medications   lactated ringers infusion ( Intravenous Stopped 9/12/19 0129)   morphine (PF) injection 4 mg (4 mg Intravenous Given 9/11/19 2880)   piperacillin-tazobactam (ZOSYN) 3.375 g vial to attach to  mL bag (0 g Intravenous Stopped 9/12/19 0325)       Drips running?  No    Home pump  No    Current LDAs  Peripheral IV 08/22/19 Right Upper forearm (Active)   Number of days: 21       Peripheral IV 09/11/19 Right Upper forearm (Active)   Site Assessment WDL 9/11/2019 11:20 PM   Line Status Saline locked 9/11/2019 11:20 PM   Phlebitis Scale 0-->no symptoms 9/11/2019 11:20 PM   Infiltration Scale 0 9/11/2019 11:20 PM   Infiltration Site Treatment Method  None 9/11/2019 11:20 PM   Extravasation? No 9/11/2019 11:20 PM   Number of days: 1        Labs results:   Labs Ordered and Resulted from Time of ED Arrival Up to the Time of Departure from the ED   CBC WITH PLATELETS DIFFERENTIAL - Abnormal; Notable for the following components:       Result Value    RBC Count 3.28 (*)     Hemoglobin 9.1 (*)     Hematocrit 30.8 (*)     MCHC 29.5 (*)     RDW 15.5 (*)     Absolute Lymphocytes 0.5 (*)     All other components within normal limits   COMPREHENSIVE METABOLIC PANEL - Abnormal; Notable for the following components:    Creatinine 1.28 (*)     GFR Estimate 46 (*)     GFR Estimate If Black 54 (*)     Albumin 2.8 (*)     Alkaline Phosphatase 255 (*)     All other components within normal limits   CRP INFLAMMATION - Abnormal; Notable for the following components:    CRP Inflammation 66.0 (*)     All other components within normal limits   ERYTHROCYTE SEDIMENTATION RATE AUTO - Abnormal; Notable for the following components:    Sed Rate 102 (*)     All other components within normal limits   ROUTINE UA WITH MICROSCOPIC - Abnormal; Notable for the following components:    Ketones Urine 10 (*)     pH Urine 7.5 (*)     Protein Albumin Urine 30 (*)     Mucous Urine Present (*)     All other components within normal limits   INR   LACTIC ACID WHOLE BLOOD   LIPASE   BLOOD CULTURE   BLOOD CULTURE       Imaging Studies:   Recent Results (from the past 24 hour(s))   US Abdomen Limited (RUQ)    Narrative    EXAMINATION: Limited Abdominal Ultrasound, 9/11/2019 11:50 PM     COMPARISON: CT abdomen 8/20/2018    HISTORY: Transfer from outside hospital for management of possible  acute cholecystitis    FINDINGS:   Fluid: No evidence of ascites or pleural effusions.    Liver: The liver demonstrates normal echotexture, measuring 18.9 cm in  craniocaudal dimension. There is no focal mass.     Gallbladder: The gallbladder wall is thickened to 4 mm, however is not  significantly hyperemic on color Doppler images. Trace pericholecystic  edema Biliary sludge and cholelithiasis,  "including stone at the  gallbladder neck.    Bile ducts: The proximal extra hepatic bile duct is dilated to 11 mm,  increased from 8/20/2018. No intrahepatic biliary ductal dilatation.    Pancreas: Obscured by shadowing bowel gas.    Kidney: The right kidney measures 9.0 cm long. There is no  hydronephrosis or hydroureter, no shadowing renal calculi, cystic  lesion or mass.       Impression    IMPRESSION:   1.  Findings concerning for acute cholecystitis, including gallbladder  wall thickening, pericholecystic edema, and multiple stones at the  gallbladder neck.  2.  Distention of the extrahepatic bile ducts to 11 mm, increased from  8/20/2018, possibly from distal choledocholithiasis (the distal ducts  are obscured) or recently passed stone.        Recent vital signs:   BP (!) 146/79   Pulse 89   Temp 98.1  F (36.7  C) (Oral)   Resp 16   Ht 1.626 m (5' 4\")   Wt 54.4 kg (120 lb)   LMP 06/07/2014 (Exact Date)   SpO2 100%   BMI 20.60 kg/m      [place] Coma Scale Score: 15 (09/11/19 2044)       Cardiac Rhythm: Normal Sinus  Pt needs tele? No  Skin/wound Issues: None    Code Status: Full Code    Pain control: good    Nausea control: good    Abnormal labs/tests/findings requiring intervention: see EPIC    Family present during ED course? Yes   Family Comments/Social Situation comments: n/A    Tasks needing completion: None    Deepthi Moyer, RN    [phone] [facility]      "

## 2019-09-12 NOTE — CONSULTS
Pulmonary Medicine  Cystic Fibrosis - Lung Transplant Team  Initial Consultation  2019       Patient: Kecia Blue  MRN: [id number]  : 1960 (age 58 year old)  Transplant: 3/1/2018 (Lung), POD#[id number]  Admission date: 2019  Consulting provider: Dom Orellana MD  Reason for consultation: Lung transplant immunosuppression    Assessment & Plan:     Kecia Blue is a 58 year old female who is S/P bilateral lung transplant 3/1/18 for ILD with Pulm HTN. Post operative course complicated by right main bronchial stenosis, positive DSAs, CMV viremia, leukopenia, C difficile and CMV viremia. Other history includes chronic cough, dermatomyositis, and seronegative RA. The patient was admitted on 2019 from OSH for surgical evaluation of abdominal pain, cholelithiasis, colicystitis, and CBD dilation. We are consulted for immunosuppression management.    S/P bilateral lung transplant for ILD:  Last seen in pulm clinic  by Dr. Mcelroy. Baseline mildly productive cough (clear sputum). Most recent PFTs with FEV1 57% (). Upon admission patient denies dyspnea, no hypoxia, but baseline cough is with increased sputum production that has changed to yellow in color. No fever, aches chills, or leukocytosis. Exam is with right sided crackles and bronchial breath sounds.  - CXR ordered to evaluate for infection vs pulmonary edema.  - Sputum culture and sputum fungal culture ordered  - Blood cultures () pending    Immunosuppression:  - Tacrolimus 2 mg qAM/ 1.5 mg qPM.  Goal level 8-10 (d/t CKD).  Next tacro level tomorrow  (ordered).  - MMF on HOLD since 18 d/t CMV viremia and leukopenia. Per Dr. Mcelroy, plan is to restart when CMV remains negative.  - Prednisone 5 mg qAM/ 2.5 qPM    Prophylaxis:   - Bactrim daily    Right main bronchial stenosis s/p dilatation: Follow with Dr. Lin for serial bronchoscopies with dilation. Last bronch  with dilation to BI.    - Next  bronchoscopy is scheduled for 10/11 with Dr. 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 CMV PCR has been <100 since 2/21/19, but now again elevated to 662 (8/20), most recently 356 (8/26) CMV has been followed Q2 week as OP.   - CMV DNA ordered (9/12)     H/o Leukopenia: Improved since 8/7, suspect d/t discontinuation of Valcyte (as above). WBC 6.1 (9/12)  - CBC daily    We appreciate the excellent care provided by the [facility] team. Recommendations communicated via in person rounding and this note. Will continue to follow along closely, please do not hesitate to call with any questions or concerns.    Patient discussed with Dr. Fermin Ibarra, APRN, CNP   Inpatient Nurse Practitioner  Pulmonary CF/Transplant  Pager [phone]     Chief Complaint:     Abdominal pain, cholelithiasis    History of Present Illness:     History obtained from patient.    Kecia Blue is a 58 year old female who is S/P bilateral lung transplant 3/1/18 for ILD with Pulm HTN. Post operative course complicated by right main bronchial stenosis, positive DSAs, CMV viremia, leukopenia, C difficile and CMV viremia. Other history includes chronic cough, dermatomyositis, and seronegative RA.     Presents with LUQ abdominal pain and CT imaging from OSH indicative of cholelithiasis, colicystitis, and CBD dilation. She is now admitted for surgical evaluation.   No acute events overnight. Denies fever, aches, chills. Remains hemodynamically stable, no fever. Today she reports her chronic cough which is normally with clear sputum has changed to thick yellow. Otherwise, breathing is comfortable with no dypnea or hypoxia. Ongoing LUQ abdominal pain, though no abdominal tenderness. Denies nausea or vomiting. Good appetite, though reports early fullness. She has not had formed BM in 2 weeks, they have been loose and small. Has been taking miralax daily. Denies dysuria. Ongoing bilateral arthralgia d/t chronic  dermatomyositis.    Review of Systems:     Complete ROS negative except as noted above.    Medical and Surgical History:     Past Medical History:   Diagnosis Date     Antisynthetase syndrome (H) 2014     Chronic cough      Chronic infection     recent C diff  8/18     Dehydration 8/1/2018     Dermatomyositis (H)      Dysplasia of cervix, low grade (ESTRADA 1)      ILD (interstitial lung disease) (H)      Osteopenia      PONV (postoperative nausea and vomiting)      Pulmonary hypertension (H)      Raynaud's disease      Seronegative rheumatoid arthritis (H)      Past Surgical History:   Procedure Laterality Date     BRONCHOSCOPY (RIGID OR FLEXIBLE), DIAGNOSTIC N/A 4/10/2018    Procedure: COMBINED BRONCHOSCOPY (RIGID OR FLEXIBLE), LAVAGE;;  Surgeon: Mariposa Donohue MD;  [facility]: [facility]     BRONCHOSCOPY (RIGID OR FLEXIBLE), DILATE BRONCHUS / TRACHEA N/A 10/11/2018    Procedure: BRONCHOSCOPY (RIGID OR FLEXIBLE), DILATE BRONCHUS / TRACHEA;  Flexible And Rigid Bronchoscopy and Dilation;  Surgeon: Wilber Lin MD;  [facility]     BRONCHOSCOPY FLEXIBLE N/A 3/13/2018    Procedure: BRONCHOSCOPY FLEXIBLE;  Flexible Bronchoscopy ;  Surgeon: Gissell Sanchez MD;  Location: [facility]     BRONCHOSCOPY FLEXIBLE N/A 5/9/2018    Procedure: BRONCHOSCOPY FLEXIBLE;;  Surgeon: Wilber Lin MD;  Location: [facility]     BRONCHOSCOPY FLEXIBLE AND RIGID N/A 9/10/2018    Procedure: BRONCHOSCOPY FLEXIBLE AND RIGID;  Flexible and Rigid Bronchoscopy with Balloon Dilation, tissue debulking with cryo, and Right mainstem bronchus stent placement;  Surgeon: Wilber Lin MD;  [facility]     BRONCHOSCOPY RIGID N/A 6/6/2018    Procedure: BRONCHOSCOPY RIGID;;  Surgeon: Lopez Macias MD;  [facility]: [facility]     BRONCHOSCOPY, DILATE BRONCHUS, STENT BRONCHUS, COMBINED N/A 6/11/2018    Procedure: COMBINED BRONCHOSCOPY, DILATE BRONCHUS, STENT BRONCHUS;  Flexible Bronchoscopy, Balloon Dilation, Bronchial Washings;   Surgeon: Wilber Lin MD;  [facility]     BRONCHOSCOPY, DILATE BRONCHUS, STENT BRONCHUS, COMBINED Right 7/10/2018    Procedure: COMBINED BRONCHOSCOPY, DILATE BRONCHUS, STENT BRONCHUS;  Flexible Bronchoscopy, Balloon Dilation, Bronchial Washings  ;  Surgeon: Wilber Lin MD;  [facility]     BRONCHOSCOPY, DILATE BRONCHUS, STENT BRONCHUS, COMBINED N/A 8/2/2018    Procedure: COMBINED BRONCHOSCOPY, DILATE BRONCHUS, STENT BRONCHUS;  Flexible Bronchoscopy, Bronchial Washings, Balloon Dilation;  Surgeon: Wilber Lin MD;  [facility]     BRONCHOSCOPY, DILATE BRONCHUS, STENT BRONCHUS, COMBINED N/A 8/20/2018    Procedure: COMBINED BRONCHOSCOPY, DILATE BRONCHUS, STENT BRONCHUS;  Flexible Bronchoscopy, Balloon Dilation;  Surgeon: Wilber Lin MD;  [facility]     BRONCHOSCOPY, DILATE BRONCHUS, STENT BRONCHUS, COMBINED N/A 10/29/2018    Procedure: Flexible Bronchoscopy, Balloon Dilation, Stent Revision, Airway Examination And Therapeutic Suctioning, Cyro Tumor Debulking;  Surgeon: Wilber Lin MD;  [facility]     BRONCHOSCOPY, DILATE BRONCHUS, STENT BRONCHUS, COMBINED N/A 11/20/2018    Procedure: Rigid Bronchoscopy, Stent Removal and dilitation;  Surgeon: Wilber Lin MD;  [facility]     BRONCHOSCOPY, DILATE BRONCHUS, STENT BRONCHUS, COMBINED N/A 12/14/2018    Procedure: Flexible And Rigid Bronchoscopy, Balloon Dilation, Bronchial Washing;  Surgeon: Wilber Lin MD;  [facility]     BRONCHOSCOPY, DILATE BRONCHUS, STENT BRONCHUS, COMBINED N/A 1/17/2019    Procedure: Flexible And Rigid Bronchoscopy, Balloon Dilation.;  Surgeon: Wilber Lin MD;  [facility]     BRONCHOSCOPY, DILATE BRONCHUS, STENT BRONCHUS, COMBINED N/A 3/7/2019    Procedure: Flexible and Rigid Bronchoscopy, Bronchial Washing, Balloon Dilation;  Surgeon: Wilber Lin MD;  [facility]     BRONCHOSCOPY, DILATE BRONCHUS, STENT BRONCHUS,  COMBINED N/A 6/6/2019    Procedure: Rigid and Flexible Bronchoscopy, Balloon Dilation;  Surgeon: Wilber Lin MD;  [facility]     ENT SURGERY      tonsillectomy as a child     ESOPHAGOSCOPY, GASTROSCOPY, DUODENOSCOPY (EGD), COMBINED N/A 10/29/2018    Procedure: COMBINED ESOPHAGOSCOPY, GASTROSCOPY, DUODENOSCOPY (EGD) with biopsies and polypectomy;  Surgeon: Chente Bloom MD;  [facility]     INSERT EXTRACORPORAL MEMBRANE OXYGENATOR ADULT (OUTSIDE OR) N/A 2/27/2018    Procedure: INSERT EXTRACORPORAL MEMBRANE OXYGENATOR ADULT (OUTSIDE OR);  INSERT EXTRACORPORAL MEMBRANE OXYGENATOR ADULT (OUTSIDE OR) ;  Surgeon: Toby Hernandez MD;  [facility]     no prior surgery       REMOVE EXTRACORPORAL MEMBRANE OXYGENATOR ADULT N/A 3/3/2018    Procedure: REMOVE EXTRACORPORAL MEMBRANE OXYGENATOR ADULT;  Removal of Right Femoral Venous and Right Axillary Arterial Extracorporeal Membrane Oxygenator;  Surgeon: Toby Hernandez MD;  [facility]     TRANSPLANT LUNG RECIPIENT SINGLE X2 Bilateral 3/1/2018    Procedure: TRANSPLANT LUNG RECIPIENT SINGLE X2;  Median Sternotomy, Extracorporeal Membrane Oxygenator, bilateral sequential lung transplant;  Surgeon: Toby Hernandez MD;  [facility]     Social and Family History:     Social History     Socioeconomic History     Marital status:      Spouse name: Not on file     Number of children: Not on file     Years of education: Not on file     Highest education level: Not on file   Occupational History     Not on file   Social Needs     Financial resource strain: Not on file     Food insecurity:     Worry: Not on file     Inability: Not on file     Transportation needs:     Medical: Not on file     Non-medical: Not on file   Tobacco Use     Smoking status: Never Smoker     Smokeless tobacco: Never Used   Substance and Sexual Activity     Alcohol use: No     Alcohol/week: 0.6 oz     Types: 1 Glasses of wine per  week     Drug use: No     Sexual activity: Not on file   Lifestyle     Physical activity:     Days per week: Not on file     Minutes per session: Not on file     Stress: Not on file   Relationships     Social connections:     Talks on phone: Not on file     Gets together: Not on file     Attends Shinto service: Not on file     Active member of club or organization: Not on file     Attends meetings of clubs or organizations: Not on file     Relationship status: Not on file     Intimate partner violence:     Fear of current or ex partner: Not on file     Emotionally abused: Not on file     Physically abused: Not on file     Forced sexual activity: Not on file   Other Topics Concern     Parent/sibling w/ CABG, MI or angioplasty before 65F 55M? No   Social History Narrative    3/6/2019 - Lives with . Has three daughters. Four grandchildren (two ). No pets. Travelled previously to [facility]. Has visited [place] several times.      Family History   Problem Relation Age of Onset     Hypertension Mother      Arthritis Mother      Pancreatic Cancer Father      Diabetes Father      Allergies and Home Medications:   No Known Allergies  Medications Prior to Admission   Medication Sig Dispense Refill Last Dose     ACETAMINOPHEN PO Take 1,000 mg by mouth every 6 hours as needed for pain   Taking     amLODIPine (NORVASC) 10 MG tablet Take 1 tablet (10 mg) by mouth daily 30 tablet 11      calcium-vitamin D (CALTRATE) 600-400 MG-UNIT per tablet Take 1 tablet by mouth daily   2019 at 0800     docusate sodium (COLACE) 50 MG capsule Take 2 capsules (100 mg) by mouth 2 times daily as needed for constipation 50 capsule 0 2019 at Unknown time     [] dronabinol (MARINOL) 5 MG capsule Take 1 capsule (5 mg) by mouth 2 times daily (before meals) 60 capsule 0 Past Week at Unknown time     ferrous sulfate (FEROSUL) 325 (65 Fe) MG tablet Take 1 tablet (325 mg) by mouth daily (with breakfast) 60 tablet 2  8/22/2019 at 0600     magnesium oxide (MAG-OX) 400 MG tablet Take 1 tablet (400 mg) by mouth 2 times daily 180 tablet 3 8/22/2019 at 0600     metoprolol tartrate (LOPRESSOR) 25 MG tablet Take 2 tablets (50 mg) by mouth 2 times daily 60 tablet 11 8/22/2019 at 0600     multivitamin, therapeutic with minerals (THERA-VIT-M) TABS tablet Take 1 tablet by mouth daily 30 each 11 8/21/2019 at 0800     ondansetron (ZOFRAN) 4 MG tablet Take 1 tablet (4 mg) by mouth every 12 hours as needed for nausea 60 tablet 0 Taking     order for DME Equipment being ordered: Nebulizer 1 Device 1 Taking     order for DME Equipment being ordered: Nebulizer 1 Device 1 Taking     pantoprazole (PROTONIX) 40 MG EC tablet Take 1 tablet (40 mg) by mouth 2 times daily 60 tablet 11 8/22/2019 at 0600     predniSONE (DELTASONE) 2.5 MG tablet Take two tablets (5mg) every AM and one tablet (2.5mg) every evening 90 tablet 11 8/22/2019 at 0600     sulfamethoxazole-trimethoprim (BACTRIM/SEPTRA) 400-80 MG tablet Take 1 tablet by mouth daily 90 tablet 3 8/21/2019 at 1400     tacrolimus (GENERIC EQUIVALENT) 0.5 MG capsule Take 1 capsule (0.5 mg) by mouth every evening Total daily dose of 2mg in AM and 1.5mg in PM 90 capsule 3      tacrolimus (GENERIC EQUIVALENT) 1 MG capsule Take two (1mg) caps ever AM and one (1mg) cap every PM. Total dose is 2mg in morning and 1.5mg every evening 270 capsule 3      Current Scheduled Meds    amLODIPine  10 mg Oral Daily     calcium carbonate 600 mg-vitamin D 400 units  1 tablet Oral Daily     ferrous sulfate  325 mg Oral Daily with breakfast     magnesium oxide  400 mg Oral BID     metoprolol tartrate  50 mg Oral BID     multivitamin w/minerals  1 tablet Oral Daily     pantoprazole  40 mg Oral BID     piperacillin-tazobactam  3.375 g Intravenous Q6H     predniSONE  2.5 mg Oral QPM     predniSONE  5 mg Oral Daily     senna-docusate  1 tablet Oral BID     sodium chloride (PF)  3 mL Intracatheter Q8H      "sulfamethoxazole-trimethoprim  1 tablet Oral Daily     tacrolimus  1.5 mg Oral QPM     tacrolimus  2 mg Oral QAM      Current PRN Meds  acetaminophen, docusate sodium, lidocaine 4%, lidocaine (buffered or not buffered), magnesium hydroxide, melatonin, naloxone, ondansetron **OR** ondansetron, oxyCODONE, polyethylene glycol, prochlorperazine **OR** prochlorperazine **OR** prochlorperazine, sodium chloride (PF)     Physical Exam:     Vital signs:  Temp: 98.4  F (36.9  C) Temp src: Oral BP: (!) 149/86(notified nurse) Pulse: 84   Resp: 16 SpO2: 100 % O2 Device: None (Room air)   Height: 162.6 cm (5' 4\") Weight: 55.1 kg (121 lb 8 oz)  I/O:     Intake/Output Summary (Last 24 hours) at 9/12/2019 1118  Last data filed at 9/12/2019 0325  Gross per 24 hour   Intake 1100 ml   Output --   Net 1100 ml     Constitutional: Awake, sitting in bed, in no apparent distress.   HEENT: Eyes with pink conjunctivae, anicteric. Oral mucosa moist without lesions. Neck supple without lymphadenopathy.   PULM: Good air flow bilaterally, bronchial breath sounds to right side. Crackles to right side, no rhonchi, no wheezes. Non-labored breathing on RA.  CV: Normal S1 and S2. RRR. No murmur, gallop, or rub. No peripheral edema.   ABD: NABS, soft, No abdominal tenderness, nondistended.    MSK: Moves all extremities. No apparent muscle wasting.   NEURO: Alert and oriented, conversant.   SKIN: Warm, dry. No rash on limited exam.   PSYCH: Mood stable.?     Lines, Drains, and Devices:  Peripheral IV 09/11/19 Right Upper forearm (Active)   Site Assessment WDL 9/12/2019 10:00 AM   Line Status Saline locked 9/12/2019 10:00 AM   Phlebitis Scale 0-->no symptoms 9/12/2019 10:00 AM   Infiltration Scale 0 9/12/2019 10:00 AM   Infiltration Site Treatment Method  None 9/12/2019 10:00 AM   Extravasation? No 9/12/2019 10:00 AM   Number of days: 1     Results:     LABS    CMP:   Recent Labs   Lab 09/11/19  2325      POTASSIUM 4.2   CHLORIDE 100   CO2 22 "   ANIONGAP 11   GLC 76   BUN 24   CR 1.28*   GFRESTIMATED 46*   GFRESTBLACK 54*   CHELSEY 9.2   PROTTOTAL 8.3   ALBUMIN 2.8*   BILITOTAL 0.4   ALKPHOS 255*   AST 29   ALT 17     CBC:   Recent Labs   Lab 09/11/19  2325   WBC 6.1   RBC 3.28*   HGB 9.1*   HCT 30.8*   MCV 94   MCH 27.7   MCHC 29.5*   RDW 15.5*          INR:   Recent Labs   Lab 09/11/19  2325   INR 1.06       Glucose:   Recent Labs   Lab 09/11/19  2325   GLC 76       Blood Gas: No lab results found in last 7 days.    Culture Data   Recent Labs   Lab 09/12/19  0002 09/11/19  2325   CULT No growth after 5 hours No growth after 5 hours       Virology Data:   Lab Results   Component Value Date    FLUAH1 Negative 03/07/2019    FLUAH3 Negative 03/07/2019    JX4115 Negative 03/07/2019    IFLUB Negative 03/07/2019    RSVA Negative 03/07/2019    RSVB Negative 03/07/2019    PIV1 Negative 03/07/2019    PIV2 Negative 03/07/2019    PIV3 Negative 03/07/2019    HMPV Negative 03/07/2019    HRVS Positive (A) 03/07/2019    ADVBE Negative 03/07/2019    ADVC Negative 03/07/2019    ADVC Negative 01/17/2019    ADVC Negative 12/14/2018       Historical CMV results (last 3 of prior testing):  Lab Results   Component Value Date    CMVQNT <137 (A) 06/05/2019    CMVQNT 494 (A) 03/07/2019    CMVQNT CMV DNA Not Detected 01/17/2019     Lab Results   Component Value Date    CMVLOG <2.1 06/05/2019    CMVLOG 2.7 (H) 03/07/2019    CMVLOG Not Calculated 01/17/2019       Urine Studies    Recent Labs   Lab Test 09/12/19  0125 08/07/19  1512   URINEPH 7.5* 7.0   NITRITE Negative Negative   LEUKEST Negative Trace*   WBCU 3 19*       Most Recent [facility] Pulmonary Function Testing (FVC/FEV1 only)  FVC-Pre   Date Value Ref Range Status   08/07/2019 2.22 L    06/05/2019 2.26 L    03/05/2019 1.97 L    01/16/2019 1.92 L      FVC-%Pred-Pre   Date Value Ref Range Status   08/07/2019 67 %    06/05/2019 70 %    03/05/2019 60 %    01/16/2019 59 %      FEV1-Pre   Date Value Ref Range Status    08/07/2019 1.60 L    06/05/2019 1.65 L    03/05/2019 1.31 L    01/16/2019 1.04 L      FEV1-%Pred-Pre   Date Value Ref Range Status   08/07/2019 61 %    06/05/2019 64 %    03/05/2019 51 %    01/16/2019 40 %        IMAGING    Recent Results (from the past 48 hour(s))   US Abdomen Limited (RUQ)    Narrative    EXAMINATION: Limited Abdominal Ultrasound, 9/11/2019 11:50 PM     COMPARISON: CT abdomen 8/20/2018    HISTORY: Transfer from outside hospital for management of possible  acute cholecystitis    FINDINGS:   Fluid: No evidence of ascites or pleural effusions.    Liver: The liver demonstrates normal echotexture, measuring 18.9 cm in  craniocaudal dimension. There is no focal mass.     Gallbladder: The gallbladder wall is thickened to 4 mm, however is not  significantly hyperemic on color Doppler images. Trace pericholecystic  edema Biliary sludge and cholelithiasis, including stone at the  gallbladder neck.    Bile ducts: The proximal extra hepatic bile duct is dilated to 11 mm,  increased from 8/20/2018. No intrahepatic biliary ductal dilatation.    Pancreas: Obscured by shadowing bowel gas.    Kidney: The right kidney measures 9.0 cm long. There is no  hydronephrosis or hydroureter, no shadowing renal calculi, cystic  lesion or mass.       Impression    IMPRESSION:   1.  In the proper clinical setting, findings concerning for acute  cholecystitis, including gallbladder wall thickening, pericholecystic  edema, and multiple stones at the gallbladder neck.  2.  Distention of the extrahepatic bile ducts to 11 mm, increased from  8/20/2018, possibly from distal choledocholithiasis (the distal ducts  are obscured) or recently passed stone.     I have personally reviewed the examination and initial interpretation  and I agree with the findings.    YONATAN HERNANDES MD

## 2019-09-12 NOTE — ED NOTES
Bed: IN02  Expected date:   Expected time:   Means of arrival:   Comments:  [facility]  Kecia Blue  1960  57 yo F hx of lung tx in 2017, hx of dermatomyositis with lung involvement  Has been having diffuse abd pain  abd CT with PO contrast (No IV contrast due to low GFR) shows stones with GB wall thickening, but not having any RUQ pain, normal LFTs  ER MD from [place], [place] spoke to transplant coordinator  Patient being discharged fr  om ED in [place], coming here due to transplant hx for surgical evaluation  Transport via private vehicle, ETA around 2100     Jayleen Redmond MD  09/11/19 2056

## 2019-09-12 NOTE — CONSULTS
GASTROENTEROLOGY CONSULTATION      Date of Admission:  9/11/2019           Reason for Consultation:   We were asked by Dr. Shaw of [facility] to evaluate this patient with possible choledocholithiasis           ASSESSMENT AND RECOMMENDATIONS:   Assessment:  58 year old female with a history of acute exacerbation of LUQ abdominal pain that she has been experiencing for 2 months. Pt has a PMH significant for bilateral lung transplant for ILD in 3/18. Imaging has been suggestive of possible acute cholecystitis and possible choledocholithiasis with CBD dilation.                   # LUQ Abdominal Pain  # Constipation  # Possible acute cholecystitis with possible choledocholithiasis  LUQ pain not correlating with imaging findings of GB issues. Suspect LUQ pain could be secondary to constipation. No bilirubin elevation and normal transaminases with mild elevation of alk phos makes obstruction less likely although CBD dilation to 11mm makes choledocholithiasis a possibility. This could represent a passed stone as well. Patient currently asymptomatic on antibiotics. Suspect given co-morbidities we will evaluate with MRCP for further signs of obstruction. If positive for choledocholithiasis ERCP will be indicated for biliary drainage and trans-cystic duct stent for GB drainage as well.                               Recommendations  --Please obtain MRI/MRCP to evaluate for further signs of biliary obstruction   --Please start bowel regimen with miralax and senna   --Trend LFTs  --Continue antibiotics   --We will consider ERCP after evaluating MRCP   --Surgery following       Thank you for involving us in this patient's care. Please do not hesitate to contact the GI service with any questions or concerns.     Pt care plan discussed with Dr. Sheppard, GI staff physician.    Alex Sharma MD  GI Fellow    [phone]  -------------------------------------------------------------------------------------------------------------------           History of Present Illness:   Keica Blue is a 58 year old female with a history of acute exacerbation of LUQ abdominal pain that she has been experiencing for 2 months. Pt has a PMH significant for bilateral lung transplant for ILD in 3/18. Imaging has been suggestive of possible acute cholecystitis and possible choledocholithiasis with CBD dilation. Patient reports pain is mainly in LUQ localized, dull with no exacerbations after eating. No nausea or vomiting. She denies any fevers or chills. She also reports no signs of overt bleeding. Went to OSH and CT showed possible acute cholecystitis with CBD of 11mm. Patient came to [facility] RUQ showed signs of acute cholecystitis with extrahepatic duct dilation as well. LFTs normal aside from elevated alk phos to 255. No WBC count elevation noted.            Past Medical History:   Reviewed and edited as appropriate  Past Medical History:   Diagnosis Date     Antisynthetase syndrome (H) 2014     Chronic cough      Chronic infection     recent C diff  8/18     Dehydration 8/1/2018     Dermatomyositis (H)      Dysplasia of cervix, low grade (ESTRADA 1)      ILD (interstitial lung disease) (H)      Osteopenia      PONV (postoperative nausea and vomiting)      Pulmonary hypertension (H)      Raynaud's disease      Seronegative rheumatoid arthritis (H)             Past Surgical History:   Reviewed and edited as appropriate   Past Surgical History:   Procedure Laterality Date     BRONCHOSCOPY (RIGID OR FLEXIBLE), DIAGNOSTIC N/A 4/10/2018    Procedure: COMBINED BRONCHOSCOPY (RIGID OR FLEXIBLE), LAVAGE;;  Surgeon: Mariposa Donohue MD;  Location:  GI     BRONCHOSCOPY (RIGID OR FLEXIBLE), DILATE BRONCHUS / TRACHEA N/A 10/11/2018    Procedure: BRONCHOSCOPY (RIGID OR FLEXIBLE), DILATE BRONCHUS / TRACHEA;  Flexible And Rigid Bronchoscopy and  Dilation;  Surgeon: Wilber Lin MD;  Location: [facility] OR     BRONCHOSCOPY FLEXIBLE N/A 3/13/2018    Procedure: BRONCHOSCOPY FLEXIBLE;  Flexible Bronchoscopy ;  Surgeon: Gissell Sanchez MD;  Location: [facility] GI     BRONCHOSCOPY FLEXIBLE N/A 5/9/2018    Procedure: BRONCHOSCOPY FLEXIBLE;;  Surgeon: Wilber Lin MD;  Location: [facility] GI     BRONCHOSCOPY FLEXIBLE AND RIGID N/A 9/10/2018    Procedure: BRONCHOSCOPY FLEXIBLE AND RIGID;  Flexible and Rigid Bronchoscopy with Balloon Dilation, tissue debulking with cryo, and Right mainstem bronchus stent placement;  Surgeon: Wilber Lin MD;  Location: [facility] OR     BRONCHOSCOPY RIGID N/A 6/6/2018    Procedure: BRONCHOSCOPY RIGID;;  Surgeon: Lopez Macias MD;  Location: [facility] GI     BRONCHOSCOPY, DILATE BRONCHUS, STENT BRONCHUS, COMBINED N/A 6/11/2018    Procedure: COMBINED BRONCHOSCOPY, DILATE BRONCHUS, STENT BRONCHUS;  Flexible Bronchoscopy, Balloon Dilation, Bronchial Washings;  Surgeon: Wilber Lin MD;  Location: [facility]     BRONCHOSCOPY, DILATE BRONCHUS, STENT BRONCHUS, COMBINED Right 7/10/2018    Procedure: COMBINED BRONCHOSCOPY, DILATE BRONCHUS, STENT BRONCHUS;  Flexible Bronchoscopy, Balloon Dilation, Bronchial Washings  ;  Surgeon: Wilber Lin MD;  [facility]: [facility]     BRONCHOSCOPY, DILATE BRONCHUS, STENT BRONCHUS, COMBINED N/A 8/2/2018    Procedure: COMBINED BRONCHOSCOPY, DILATE BRONCHUS, STENT BRONCHUS;  Flexible Bronchoscopy, Bronchial Washings, Balloon Dilation;  Surgeon: Wilber Lin MD;  [facility]: [facility]     BRONCHOSCOPY, DILATE BRONCHUS, STENT BRONCHUS, COMBINED N/A 8/20/2018    Procedure: COMBINED BRONCHOSCOPY, DILATE BRONCHUS, STENT BRONCHUS;  Flexible Bronchoscopy, Balloon Dilation;  Surgeon: Wilber Lin MD;  [facility]: [facility]     BRONCHOSCOPY, DILATE BRONCHUS, STENT BRONCHUS, COMBINED N/A 10/29/2018    Procedure: Flexible Bronchoscopy, Balloon Dilation, Stent Revision, Airway Examination And  Therapeutic Suctioning, Cyro Tumor Debulking;  Surgeon: Wilber Lin MD;  [facility]: [facility]     BRONCHOSCOPY, DILATE BRONCHUS, STENT BRONCHUS, COMBINED N/A 11/20/2018    Procedure: Rigid Bronchoscopy, Stent Removal and dilitation;  Surgeon: Wilber Lin MD;  [facility]     BRONCHOSCOPY, DILATE BRONCHUS, STENT BRONCHUS, COMBINED N/A 12/14/2018    Procedure: Flexible And Rigid Bronchoscopy, Balloon Dilation, Bronchial Washing;  Surgeon: Wilber Lin MD;  [facility]     BRONCHOSCOPY, DILATE BRONCHUS, STENT BRONCHUS, COMBINED N/A 1/17/2019    Procedure: Flexible And Rigid Bronchoscopy, Balloon Dilation.;  Surgeon: Wilber Lin MD;  [facility]     BRONCHOSCOPY, DILATE BRONCHUS, STENT BRONCHUS, COMBINED N/A 3/7/2019    Procedure: Flexible and Rigid Bronchoscopy, Bronchial Washing, Balloon Dilation;  Surgeon: Wilber Lin MD;  [facility]     BRONCHOSCOPY, DILATE BRONCHUS, STENT BRONCHUS, COMBINED N/A 6/6/2019    Procedure: Rigid and Flexible Bronchoscopy, Balloon Dilation;  Surgeon: Wilber Lin MD;  Location: [facility]     ENT SURGERY      tonsillectomy as a child     ESOPHAGOSCOPY, GASTROSCOPY, DUODENOSCOPY (EGD), COMBINED N/A 10/29/2018    Procedure: COMBINED ESOPHAGOSCOPY, GASTROSCOPY, DUODENOSCOPY (EGD) with biopsies and polypectomy;  Surgeon: Chente Bloom MD;  [facility]     INSERT EXTRACORPORAL MEMBRANE OXYGENATOR ADULT (OUTSIDE OR) N/A 2/27/2018    Procedure: INSERT EXTRACORPORAL MEMBRANE OXYGENATOR ADULT (OUTSIDE OR);  INSERT EXTRACORPORAL MEMBRANE OXYGENATOR ADULT (OUTSIDE OR) ;  Surgeon: Toby Hernandez MD;  Location: [facility]     no prior surgery       REMOVE EXTRACORPORAL MEMBRANE OXYGENATOR ADULT N/A 3/3/2018    Procedure: REMOVE EXTRACORPORAL MEMBRANE OXYGENATOR ADULT;  Removal of Right Femoral Venous and Right Axillary Arterial Extracorporeal Membrane Oxygenator;  Surgeon: Toby Hernandez MD;   Location: [facility]     TRANSPLANT LUNG RECIPIENT SINGLE X2 Bilateral 3/1/2018    Procedure: TRANSPLANT LUNG RECIPIENT SINGLE X2;  Median Sternotomy, Extracorporeal Membrane Oxygenator, bilateral sequential lung transplant;  Surgeon: Toby Hernandez MD;  Location: [facility]            Previous Endoscopy:   No results found for this or any previous visit.         Social History:   Reviewed and edited as appropriate  Social History     Socioeconomic History     Marital status:      Spouse name: Not on file     Number of children: Not on file     Years of education: Not on file     Highest education level: Not on file   Occupational History     Not on file   Social Needs     Financial resource strain: Not on file     Food insecurity:     Worry: Not on file     Inability: Not on file     Transportation needs:     Medical: Not on file     Non-medical: Not on file   Tobacco Use     Smoking status: Never Smoker     Smokeless tobacco: Never Used   Substance and Sexual Activity     Alcohol use: No     Alcohol/week: 0.6 oz     Types: 1 Glasses of wine per week     Drug use: No     Sexual activity: Not on file   Lifestyle     Physical activity:     Days per week: Not on file     Minutes per session: Not on file     Stress: Not on file   Relationships     Social connections:     Talks on phone: Not on file     Gets together: Not on file     Attends Congregational service: Not on file     Active member of club or organization: Not on file     Attends meetings of clubs or organizations: Not on file     Relationship status: Not on file     Intimate partner violence:     Fear of current or ex partner: Not on file     Emotionally abused: Not on file     Physically abused: Not on file     Forced sexual activity: Not on file   Other Topics Concern     Parent/sibling w/ CABG, MI or angioplasty before 65F 55M? No   Social History Narrative    3/6/2019 - Lives with . Has three daughters. Four grandchildren (two ). No  pets. Travelled previously to [facility]. Has visited [place] several times.             Family History:   Reviewed and edited as appropriate  Family History   Problem Relation Age of Onset     Hypertension Mother      Arthritis Mother      Pancreatic Cancer Father      Diabetes Father       No known history of colorectal cancer, liver disease, or inflammatory bowel disease.         Allergies:   Reviewed and edited as appropriate   No Known Allergies         Medications:     Current Facility-Administered Medications   Medication     acetaminophen (TYLENOL) tablet 650 mg     amLODIPine (NORVASC) tablet 10 mg     calcium carbonate 600 mg-vitamin D 400 units (CALTRATE) per tablet 1 tablet     docusate sodium (COLACE) capsule 100 mg     ferrous sulfate (FEROSUL) tablet 325 mg     lidocaine (LMX4) cream     lidocaine 1 % 0.1-1 mL     magnesium hydroxide (MILK OF MAGNESIA) suspension 30 mL     magnesium oxide (MAG-OX) tablet 400 mg     melatonin tablet 1 mg     metoprolol tartrate (LOPRESSOR) tablet 50 mg     multivitamin w/minerals (THERA-VIT-M) tablet 1 tablet     naloxone (NARCAN) injection 0.1-0.4 mg     ondansetron (ZOFRAN-ODT) ODT tab 4 mg    Or     ondansetron (ZOFRAN) injection 4 mg     oxyCODONE (ROXICODONE) tablet 5 mg     pantoprazole (PROTONIX) EC tablet 40 mg     pink lady enema (COMPOUNDED: docusate, magnesium citrate, mineral oil, sodium phosphate)     piperacillin-tazobactam (ZOSYN) 3.375 g vial to attach to  mL bag     polyethylene glycol (MIRALAX/GLYCOLAX) Packet 17 g     predniSONE (DELTASONE) tablet 2.5 mg     predniSONE (DELTASONE) tablet 5 mg     prochlorperazine (COMPAZINE) injection 10 mg    Or     prochlorperazine (COMPAZINE) tablet 10 mg    Or     prochlorperazine (COMPAZINE) Suppository 25 mg     senna-docusate (SENOKOT-S/PERICOLACE) 8.6-50 MG per tablet 1 tablet     sodium chloride (PF) 0.9% PF flush 3 mL     sodium chloride (PF) 0.9% PF flush 3 mL     sulfamethoxazole-trimethoprim  "(BACTRIM/SEPTRA) 400-80 MG per tablet 1 tablet     tacrolimus (GENERIC EQUIVALENT) capsule 1.5 mg     tacrolimus (GENERIC EQUIVALENT) capsule 2 mg             Review of Systems:     A complete 10 point review of systems was performed and is negative except as noted in the HPI           Physical Exam:   /75 (BP Location: Left arm)   Pulse 72   Temp 98  F (36.7  C) (Oral)   Resp 18   Ht 1.626 m (5' 4\")   Wt 55.1 kg (121 lb 8 oz)   LMP 06/07/2014 (Exact Date)   SpO2 97%   BMI 20.86 kg/m    Wt:   Wt Readings from Last 2 Encounters:   09/12/19 55.1 kg (121 lb 8 oz)   08/07/19 54 kg (119 lb)      Constitutional: cooperative, pleasant, not dyspneic/diaphoretic, no acute distress  Eyes: Sclera anicteric/injected  CV: No edema  Respiratory: Harsh cough with labored breathing  Abd:  Mildly distended, no hepatosplenomegaly, nontender, no peritoneal signs  Skin: warm, perfused, no jaundice  Neuro: AAO x 3,  Psych: Normal affect  MSK: No gross deformities         Data:   Labs and imaging below were independently reviewed and interpreted    BMP  Recent Labs   Lab 09/11/19  2325      POTASSIUM 4.2   CHLORIDE 100   CHELSEY 9.2   CO2 22   BUN 24   CR 1.28*   GLC 76     CBC  Recent Labs   Lab 09/11/19 2325   WBC 6.1   RBC 3.28*   HGB 9.1*   HCT 30.8*   MCV 94   MCH 27.7   MCHC 29.5*   RDW 15.5*        INR  Recent Labs   Lab 09/11/19  2325   INR 1.06     LFTs  Recent Labs   Lab 09/11/19  2325   ALKPHOS 255*   AST 29   ALT 17   BILITOTAL 0.4   PROTTOTAL 8.3   ALBUMIN 2.8*      PANC  Recent Labs   Lab 09/11/19  2325   LIPASE 127       Imaging:   RUQ US 9/11/19  IMPRESSION:   1.  In the proper clinical setting, findings concerning for acute  cholecystitis, including gallbladder wall thickening, pericholecystic  edema, and multiple stones at the gallbladder neck.  2.  Distention of the extrahepatic bile ducts to 11 mm, increased from  8/20/2018, possibly from distal choledocholithiasis (the distal ducts  are " obscured) or recently passed stone.

## 2019-09-12 NOTE — PLAN OF CARE
"Focus:  Admission  D: Admitted from: [facility] for cholecystitis. Hx includes bilateral lung txp (3/2018) for ILD, anti-synthetase syndrome, dermatomyositis, pulmonary hypertension, chronic cough, seronegative RA.     I: Belongings kept with patient per preference. Declined to keep any valuables with security. Admission paperwork complete. Home meds sent with . Teaching: Call don't fall, use of console, meal times, visiting hours, orientation to unit, when to call for the RN (angina/sob/dizzyness, etc.), and stressed the importance of safety. Access: PIV. No tele orders currently.     A: A&Ox4. Afebrile. VSS on room air. Lung sounds coarse throughout. No tele orders, HR strong, regular. Skin intact, 2RN skin assessment to be completed. Denies pain and shortness of breath. Denies n/v/d. States that her last bowel movent was \"about two weeks ago\". Currently no abdominal pain with palpation. Flatus +. PV saline locked. Up ad diego.     P: Administer medications per MD order, follow plan of care, and notify MD as necessary with questions/concerns.    "

## 2019-09-12 NOTE — PHARMACY-ADMISSION MEDICATION HISTORY
Admission medication history interview status for the 9/11/2019 admission is complete. See Epic admission navigator for allergy information, pharmacy, prior to admission medications and immunization status.     Medication history interview sources:  Pt, electronic med list provided by patient, chart review, dispense history    Changes made to PTA medication list (reason)  Added: None    Deleted:   -docusate: Pt no longer using    Changed:   -magnesium: Pt takes 2 tabs once daily, not 1 tab BID      Additional medication history information (including reliability of information, actions taken by pharmacist):    -Pt is a good historian and was able to report all medications accurately with the aid of an electronic med list.     -Pt takes generic tacrolimus.    Prior to Admission medications    Medication Sig Last Dose Taking? Auth Provider   acetaminophen (TYLENOL) 500 MG tablet Take 1,000 mg by mouth every 6 hours as needed for pain  Past Week at Unknown time Yes Reported, Patient   amLODIPine (NORVASC) 10 MG tablet Take 1 tablet (10 mg) by mouth daily 9/11/2019 at PM Yes Ame Chow PA-C   calcium-vitamin D (CALTRATE) 600-400 MG-UNIT per tablet Take 1 tablet by mouth daily 9/11/2019 at 1500 Yes Ame Chow PA-C   dronabinol (MARINOL) 5 MG capsule Take 5 mg by mouth 2 times daily (before meals) as needed Past Week at Unknown time Yes Unknown, Entered By History   ferrous sulfate (FEROSUL) 325 (65 Fe) MG tablet Take 1 tablet (325 mg) by mouth daily (with breakfast) 9/11/2019 at AM Yes Alex Hale MD   magnesium oxide (MAG-OX) 400 MG tablet Take 800 mg by mouth daily 9/11/2019 at PM Yes Unknown, Entered By History   metoprolol tartrate (LOPRESSOR) 25 MG tablet Take 2 tablets (50 mg) by mouth 2 times daily 9/11/2019 at PM Yes Ame Chow PA-C   multivitamin, therapeutic with minerals (THERA-VIT-M) TABS tablet Take 1 tablet by mouth daily 9/11/2019 at PM Yes Alex Hale MD    ondansetron (ZOFRAN) 4 MG tablet Take 1 tablet (4 mg) by mouth every 12 hours as needed for nausea Past Week at Unknown time Yes Ame Chow PA-C   pantoprazole (PROTONIX) 40 MG EC tablet Take 1 tablet (40 mg) by mouth 2 times daily 9/11/2019 at PM Yes Ame Chow PA-C   predniSONE (DELTASONE) 2.5 MG tablet Take two tablets (5mg) every AM and one tablet (2.5mg) every evening 9/11/2019 at PM Yes Ame Chow PA-C   sulfamethoxazole-trimethoprim (BACTRIM/SEPTRA) 400-80 MG tablet Take 1 tablet by mouth daily 9/11/2019 at AM Yes Alex Hale MD   tacrolimus (GENERIC EQUIVALENT) 0.5 MG capsule Take 1 capsule (0.5 mg) by mouth every evening Total daily dose of 2mg in AM and 1.5mg in PM 9/11/2019 at PM Yes Srinivas Mcelroy MD   tacrolimus (GENERIC EQUIVALENT) 1 MG capsule Take two (1mg) caps ever AM and one (1mg) cap every PM. Total dose is 2mg in morning and 1.5mg every evening 9/11/2019 at PM Yes Srinivas Mcelroy MD   order for DME Equipment being ordered: Wilber Fernandes MD   order for DME Equipment being ordered: Wilber Fernandes MD       Medication history completed by:   Dodie Mendenhall, Student Pharmacist  September 12, 2019

## 2019-09-12 NOTE — PLAN OF CARE
D: Admitted from OSH for possible cholecystitis.      I: Monitored vitals and assessed pt status.      Running: LR bolus 250mL  PRN: Milk of Mag X1 given - no results, Oxy 5mg and tylenol 650mg X1 LUQ discomfort.  Tele: No tele orders  O2: RA  Mobility: Independent     A: A0x4. VSS. Afebrile. Urinating adequately no saving, but pt reports normal UOP. Pt had MRCP and CT today. Plan for NPO at midnight for possible ERCP with BG stent tomorrow.   Pt endorses mild discomfort in LUQ, educated about use of opioids with ileus.      P: Continue to monitor Pt status and report changes to Gold 10.     Temp:  [97.7  F (36.5  C)-98.4  F (36.9  C)] 97.7  F (36.5  C)  Pulse:  [72-89] 79  Resp:  [16-18] 18  BP: (125-161)/() 130/80  SpO2:  [97 %-100 %] 97 %

## 2019-09-13 ENCOUNTER — APPOINTMENT (OUTPATIENT)
Dept: INTERVENTIONAL RADIOLOGY/VASCULAR | Facility: CLINIC | Age: 59
DRG: 205 | End: 2019-09-13
Attending: PHYSICIAN ASSISTANT
Payer: COMMERCIAL

## 2019-09-13 LAB
ALBUMIN SERPL-MCNC: 2.3 G/DL (ref 3.4–5)
ALP SERPL-CCNC: 212 U/L (ref 40–150)
ALT SERPL W P-5'-P-CCNC: 14 U/L (ref 0–50)
ANION GAP SERPL CALCULATED.3IONS-SCNC: 8 MMOL/L (ref 3–14)
AST SERPL W P-5'-P-CCNC: 21 U/L (ref 0–45)
BILIRUB SERPL-MCNC: 0.3 MG/DL (ref 0.2–1.3)
BUN SERPL-MCNC: 31 MG/DL (ref 7–30)
CALCIUM SERPL-MCNC: 8.5 MG/DL (ref 8.5–10.1)
CHLORIDE SERPL-SCNC: 105 MMOL/L (ref 94–109)
CMV DNA SPEC NAA+PROBE-ACNC: <137 [IU]/ML
CMV DNA SPEC NAA+PROBE-LOG#: <2.1 {LOG_IU}/ML
CO2 SERPL-SCNC: 22 MMOL/L (ref 20–32)
CREAT SERPL-MCNC: 1.51 MG/DL (ref 0.52–1.04)
CRP SERPL-MCNC: 58 MG/L (ref 0–8)
ERYTHROCYTE [DISTWIDTH] IN BLOOD BY AUTOMATED COUNT: 15.6 % (ref 10–15)
ERYTHROCYTE [SEDIMENTATION RATE] IN BLOOD BY WESTERGREN METHOD: 111 MM/H (ref 0–30)
GFR SERPL CREATININE-BSD FRML MDRD: 38 ML/MIN/{1.73_M2}
GLUCOSE SERPL-MCNC: 120 MG/DL (ref 70–99)
HCT VFR BLD AUTO: 26.8 % (ref 35–47)
HGB BLD-MCNC: 8 G/DL (ref 11.7–15.7)
MCH RBC QN AUTO: 27.5 PG (ref 26.5–33)
MCHC RBC AUTO-ENTMCNC: 29.9 G/DL (ref 31.5–36.5)
MCV RBC AUTO: 92 FL (ref 78–100)
PLATELET # BLD AUTO: 186 10E9/L (ref 150–450)
POTASSIUM SERPL-SCNC: 4.5 MMOL/L (ref 3.4–5.3)
PROT SERPL-MCNC: 7 G/DL (ref 6.8–8.8)
RBC # BLD AUTO: 2.91 10E12/L (ref 3.8–5.2)
SODIUM SERPL-SCNC: 134 MMOL/L (ref 133–144)
SPECIMEN SOURCE: ABNORMAL
TACROLIMUS BLD-MCNC: 9.5 UG/L (ref 5–15)
TME LAST DOSE: NORMAL H
WBC # BLD AUTO: 5 10E9/L (ref 4–11)

## 2019-09-13 PROCEDURE — 25000132 ZZH RX MED GY IP 250 OP 250 PS 637: Performed by: HOSPITALIST

## 2019-09-13 PROCEDURE — 36415 COLL VENOUS BLD VENIPUNCTURE: CPT | Performed by: NURSE PRACTITIONER

## 2019-09-13 PROCEDURE — 32555 ASPIRATE PLEURA W/ IMAGING: CPT

## 2019-09-13 PROCEDURE — 36415 COLL VENOUS BLD VENIPUNCTURE: CPT | Performed by: INTERNAL MEDICINE

## 2019-09-13 PROCEDURE — 27210732 ZZH ACCESSORY CR1

## 2019-09-13 PROCEDURE — 25000131 ZZH RX MED GY IP 250 OP 636 PS 637: Performed by: HOSPITALIST

## 2019-09-13 PROCEDURE — 86140 C-REACTIVE PROTEIN: CPT | Performed by: INTERNAL MEDICINE

## 2019-09-13 PROCEDURE — 27211039 ZZH NEEDLE CR2

## 2019-09-13 PROCEDURE — 27210738 ZZH ACCESSORY CR2

## 2019-09-13 PROCEDURE — 21400000 ZZH R&B CCU UMMC

## 2019-09-13 PROCEDURE — 85027 COMPLETE CBC AUTOMATED: CPT | Performed by: HOSPITALIST

## 2019-09-13 PROCEDURE — 25000132 ZZH RX MED GY IP 250 OP 250 PS 637: Performed by: STUDENT IN AN ORGANIZED HEALTH CARE EDUCATION/TRAINING PROGRAM

## 2019-09-13 PROCEDURE — 25000125 ZZHC RX 250: Performed by: STUDENT IN AN ORGANIZED HEALTH CARE EDUCATION/TRAINING PROGRAM

## 2019-09-13 PROCEDURE — C1769 GUIDE WIRE: HCPCS

## 2019-09-13 PROCEDURE — 25000128 H RX IP 250 OP 636: Performed by: HOSPITALIST

## 2019-09-13 PROCEDURE — 85652 RBC SED RATE AUTOMATED: CPT | Performed by: INTERNAL MEDICINE

## 2019-09-13 PROCEDURE — 80053 COMPREHEN METABOLIC PANEL: CPT | Performed by: HOSPITALIST

## 2019-09-13 PROCEDURE — 99233 SBSQ HOSP IP/OBS HIGH 50: CPT | Performed by: STUDENT IN AN ORGANIZED HEALTH CARE EDUCATION/TRAINING PROGRAM

## 2019-09-13 PROCEDURE — 36415 COLL VENOUS BLD VENIPUNCTURE: CPT | Performed by: HOSPITALIST

## 2019-09-13 PROCEDURE — 80197 ASSAY OF TACROLIMUS: CPT | Performed by: NURSE PRACTITIONER

## 2019-09-13 PROCEDURE — C1729 CATH, DRAINAGE: HCPCS

## 2019-09-13 RX ORDER — PIPERACILLIN SODIUM, TAZOBACTAM SODIUM 2; .25 G/10ML; G/10ML
2.25 INJECTION, POWDER, LYOPHILIZED, FOR SOLUTION INTRAVENOUS EVERY 6 HOURS
Status: DISCONTINUED | OUTPATIENT
Start: 2019-09-13 | End: 2019-09-13

## 2019-09-13 RX ORDER — LIDOCAINE HYDROCHLORIDE 10 MG/ML
1-30 INJECTION, SOLUTION EPIDURAL; INFILTRATION; INTRACAUDAL; PERINEURAL
Status: DISCONTINUED | OUTPATIENT
Start: 2019-09-13 | End: 2019-09-13 | Stop reason: HOSPADM

## 2019-09-13 RX ORDER — OXYCODONE HYDROCHLORIDE 5 MG/1
5 TABLET ORAL EVERY 6 HOURS PRN
Status: DISCONTINUED | OUTPATIENT
Start: 2019-09-13 | End: 2019-09-15 | Stop reason: HOSPADM

## 2019-09-13 RX ORDER — ACETAMINOPHEN 325 MG/1
975 TABLET ORAL 3 TIMES DAILY PRN
Status: DISCONTINUED | OUTPATIENT
Start: 2019-09-13 | End: 2019-09-14

## 2019-09-13 RX ORDER — ACETAMINOPHEN 325 MG/1
975 TABLET ORAL 3 TIMES DAILY
Status: DISCONTINUED | OUTPATIENT
Start: 2019-09-13 | End: 2019-09-13

## 2019-09-13 RX ADMIN — SENNOSIDES AND DOCUSATE SODIUM 2 TABLET: 8.6; 5 TABLET ORAL at 20:44

## 2019-09-13 RX ADMIN — Medication 400 MG: at 08:23

## 2019-09-13 RX ADMIN — PREDNISONE 2.5 MG: 2.5 TABLET ORAL at 20:44

## 2019-09-13 RX ADMIN — PIPERACILLIN SODIUM AND TAZOBACTAM SODIUM 3.38 G: 3; .375 INJECTION, POWDER, LYOPHILIZED, FOR SOLUTION INTRAVENOUS at 02:47

## 2019-09-13 RX ADMIN — AMLODIPINE BESYLATE 10 MG: 10 TABLET ORAL at 08:23

## 2019-09-13 RX ADMIN — PIPERACILLIN SODIUM AND TAZOBACTAM SODIUM 3.38 G: 3; .375 INJECTION, POWDER, LYOPHILIZED, FOR SOLUTION INTRAVENOUS at 08:26

## 2019-09-13 RX ADMIN — METOPROLOL TARTRATE 50 MG: 50 TABLET ORAL at 08:24

## 2019-09-13 RX ADMIN — OXYCODONE HYDROCHLORIDE 5 MG: 5 TABLET ORAL at 08:22

## 2019-09-13 RX ADMIN — FERROUS SULFATE TAB 325 MG (65 MG ELEMENTAL FE) 325 MG: 325 (65 FE) TAB at 08:23

## 2019-09-13 RX ADMIN — POLYETHYLENE GLYCOL 3350 17 G: 17 POWDER, FOR SOLUTION ORAL at 08:24

## 2019-09-13 RX ADMIN — POLYETHYLENE GLYCOL 3350 17 G: 17 POWDER, FOR SOLUTION ORAL at 20:45

## 2019-09-13 RX ADMIN — MULTIPLE VITAMINS W/ MINERALS TAB 1 TABLET: TAB at 08:23

## 2019-09-13 RX ADMIN — PANTOPRAZOLE SODIUM 40 MG: 40 TABLET, DELAYED RELEASE ORAL at 08:23

## 2019-09-13 RX ADMIN — OXYCODONE HYDROCHLORIDE 5 MG: 5 TABLET ORAL at 20:44

## 2019-09-13 RX ADMIN — SENNOSIDES AND DOCUSATE SODIUM 2 TABLET: 8.6; 5 TABLET ORAL at 08:23

## 2019-09-13 RX ADMIN — TACROLIMUS 1.5 MG: 1 CAPSULE ORAL at 17:52

## 2019-09-13 RX ADMIN — PREDNISONE 5 MG: 5 TABLET ORAL at 08:23

## 2019-09-13 RX ADMIN — METOPROLOL TARTRATE 50 MG: 50 TABLET ORAL at 20:44

## 2019-09-13 RX ADMIN — SULFAMETHOXAZOLE AND TRIMETHOPRIM 1 TABLET: 400; 80 TABLET ORAL at 08:23

## 2019-09-13 RX ADMIN — PANTOPRAZOLE SODIUM 40 MG: 40 TABLET, DELAYED RELEASE ORAL at 20:44

## 2019-09-13 RX ADMIN — Medication 1 TABLET: at 08:23

## 2019-09-13 RX ADMIN — LIDOCAINE HYDROCHLORIDE 7 ML: 10 INJECTION, SOLUTION EPIDURAL; INFILTRATION; INTRACAUDAL; PERINEURAL at 14:08

## 2019-09-13 RX ADMIN — TACROLIMUS 2 MG: 1 CAPSULE ORAL at 08:24

## 2019-09-13 RX ADMIN — Medication 400 MG: at 20:44

## 2019-09-13 ASSESSMENT — ACTIVITIES OF DAILY LIVING (ADL)
ADLS_ACUITY_SCORE: 11

## 2019-09-13 ASSESSMENT — ENCOUNTER SYMPTOMS
CONSTIPATION: 1
COUGH: 1
FREQUENCY: 0
VOMITING: 0
NECK PAIN: 0
DYSURIA: 0
FATIGUE: 0
BACK PAIN: 0
WEAKNESS: 0
NAUSEA: 0
NECK STIFFNESS: 0
DIAPHORESIS: 0
FEVER: 0
HEMATURIA: 0
PSYCHIATRIC NEGATIVE: 1
CHILLS: 1

## 2019-09-13 ASSESSMENT — PAIN DESCRIPTION - DESCRIPTORS: DESCRIPTORS: ACHING

## 2019-09-13 ASSESSMENT — MIFFLIN-ST. JEOR: SCORE: 1131.11

## 2019-09-13 NOTE — CONSULTS
[facility]  Transplant Infectious Disease Consult Note - New Patient     Patient:  Kecia Blue, Date of birth 1960, Medical record number [id number]  Date of Visit:  09/13/2019  Consult requested by Dr. Romel Rodríguez  for evaluation of antibiotics for empyema         Assessment and Recommendations:   Recommendations:  - agree with plan for diagnostic thoracentesis including gram stain, aerobic and anaerobic bacterial culture, marie and fungal culture and AFB stain and culture as well as actinomyces rule out   - if unable to obtain diagnostic fluid today would consider holding further antibiotics until we are able to obtain tissue / fluid for culture (unless patient appears septic, in that case would resume/continue antibiotics)  - follow up pending CMV quantitative level  - cont prophylactic Bactrim    Thank you very much for this consultation. Transplant Infectious Disease will continue to follow with you.    Assessment:   57 yo female with pmh anti-synthetase/dermatomyositis who is s/p bilateral lung transplant (3/1/18) for ILD, course complicated by R main bronchial stenosis requiring repeated dilation, positive DSAs and CMV requiring valganciclovir as well as Actinomyces from BAL who is admitted with L sided abdominal / chest pain.     Acute Infectious Disease issues include:  # L sided empyema, possible adjacent osteomyelitis of the 10th rib  # LUQ chest pain  # Dilation of biliary duct (initial concern for cholecystitis and choledocholithiasis at this point thought to be incidental findings)  Patient admitted with LUQ chest pain, initially thought to be biliary issue however found to have L sided empyema which is most likely cause of her symptoms. Not septic on admission. Has been on pip-tazo since 9/12/19. Plan to go to IR for potential drainage and chest tube placement with diagnostics on fluid planning to be sent. As patient not septic and biliary infection thought to be  unlikely at this point, would recommend considering holding additional antibiotics until we have obtained diagnostic samples, particularly if collection of samples is delayed for any reason. She has history of Actinomyces from her BAL cultures, however at this point differential for empyema remains broad and includes bacterial, AFB, fungal organisms.        # Low level CMV viremia   Was on ganciclovir until 7/25. Currently biweekly monitoring of low level CMV viremia. Patient is asymptomatic. CMV quantitative this admission pending.     Additional/Chronic Infectious Disease issues include:  -Hx of Actinomyces spp from BAL: Patient with positive BAL for Actinomyces spp on 8/2/2018 and 1/17/2019, Neg March 2019. Did not appear to worsen in the intervals during which she was not treated.     - PCP prophylaxis: TMP/SMX  - Serostatus: CMV D+/R+, EBV D+/R+  - Gamma globulin status: 698 on 3/1/2018  - Isolation status:  Good hand hygiene.    Montse Womack MD,  Infectious Diseases Fellow           History of Infectious Disease Illness:    59 yo female with pmh anti-synthetase/dermatomyositis who is s/p bilateral lung transplant (3/1/18) for ILD, course complicated by R main bronchial stenosis requiring repeated dilation, positive DSAs and CMV requiring valganciclovir as well as Actinomyces from BAL who is admitted with L sided abdominal / chest pain.     Patient states that she has had L sided upper abdominal / chest pain for several months. It became intolerably worse the past week or so and thus prompting her to present to the ED. Initially imaging at outside facility was concerning for biliary pathology and she was transferred to [facility]. Initial concern was for cholecystitis / choledocholithiasis and evaluated by GI and surgery, however after additional diagnostics she was found to have ~4cm/2.5cm empyema/abscess. She was afebrile and hemodynamically stable on admission. She has been on pip-tazo since 9/12/19. Plan is  for IR drainage / chest tube to this area today.     At the bedside this afternoon she has no acute complaints. The pain on her L side / chest is still present however improved somewhat with pain regimen. She denies feeling SOB. She does have a chronic cough that has been somewhat more yellowish sputum recently. Denies hemoptysis. Says overall her respiratory status has been very stable over the past 9 months, and can't recall the last time that she had pneumonia or major breathing issue. Denies fevers, chills, nausea/vomiting, abdominal pain or diarrhea (has been constipated recently), urinary symptoms, rashes. [place] with [place]  [place] farm, no animals on their farm. Nearby children also have a farm and they have cattle. No recent renovation / digging in soil, spelunking or adventuring outside or travel (apart form trip to [place] and [place]).       Transplants:  3/1/2018 (Lung), Postoperative day:  561.  Coordinator Mikayla Latham    Review of Systems:  CONSTITUTIONAL:  No fevers or chills  EYES: negative for icterus or acute vision changes  ENT:  negative for acute hearing loss, tinnitus, sore throat  RESPIRATORY:  negative for cough, sputum or dyspnea  CARDIOVASCULAR:  + left sided chest pain, neg for palpitations  GASTROINTESTINAL:  negative for nausea, vomiting, diarrhea. + for constipation  GENITOURINARY:  negative for dysuria or hematuria  HEME:  No easy bruising or bleeding  INTEGUMENT:  negative for rash or pruritus  NEURO:  Negative for headache or tremor    Past Medical History:   Diagnosis Date     Antisynthetase syndrome (H) 2014     Chronic cough      Chronic infection     recent C diff  8/18     Dehydration 8/1/2018     Dermatomyositis (H)      Dysplasia of cervix, low grade (ESTRADA 1)      ILD (interstitial lung disease) (H)      Osteopenia      PONV (postoperative nausea and vomiting)      Pulmonary hypertension (H)      Raynaud's disease      Seronegative rheumatoid arthritis (H)        Past  Surgical History:   Procedure Laterality Date     BRONCHOSCOPY (RIGID OR FLEXIBLE), DIAGNOSTIC N/A 4/10/2018    Procedure: COMBINED BRONCHOSCOPY (RIGID OR FLEXIBLE), LAVAGE;;  Surgeon: Mariposa Donohue MD;  [facility]     BRONCHOSCOPY (RIGID OR FLEXIBLE), DILATE BRONCHUS / TRACHEA N/A 10/11/2018    Procedure: BRONCHOSCOPY (RIGID OR FLEXIBLE), DILATE BRONCHUS / TRACHEA;  Flexible And Rigid Bronchoscopy and Dilation;  Surgeon: Wilber Lin MD;  [facility]     BRONCHOSCOPY FLEXIBLE N/A 3/13/2018    Procedure: BRONCHOSCOPY FLEXIBLE;  Flexible Bronchoscopy ;  Surgeon: Gissell Sanchez MD;  [facility]     BRONCHOSCOPY FLEXIBLE N/A 5/9/2018    Procedure: BRONCHOSCOPY FLEXIBLE;;  Surgeon: Wilber Lin MD;  [facility]     BRONCHOSCOPY FLEXIBLE AND RIGID N/A 9/10/2018    Procedure: BRONCHOSCOPY FLEXIBLE AND RIGID;  Flexible and Rigid Bronchoscopy with Balloon Dilation, tissue debulking with cryo, and Right mainstem bronchus stent placement;  Surgeon: Wilber Lin MD;  [facility]     BRONCHOSCOPY RIGID N/A 6/6/2018    Procedure: BRONCHOSCOPY RIGID;;  Surgeon: Lopez Macias MD;  [facility]     BRONCHOSCOPY, DILATE BRONCHUS, STENT BRONCHUS, COMBINED N/A 6/11/2018    Procedure: COMBINED BRONCHOSCOPY, DILATE BRONCHUS, STENT BRONCHUS;  Flexible Bronchoscopy, Balloon Dilation, Bronchial Washings;  Surgeon: Wilber Lin MD;  [facility]     BRONCHOSCOPY, DILATE BRONCHUS, STENT BRONCHUS, COMBINED Right 7/10/2018    Procedure: COMBINED BRONCHOSCOPY, DILATE BRONCHUS, STENT BRONCHUS;  Flexible Bronchoscopy, Balloon Dilation, Bronchial Washings  ;  Surgeon: Wilber Lin MD;  [facility]     BRONCHOSCOPY, DILATE BRONCHUS, STENT BRONCHUS, COMBINED N/A 8/2/2018    Procedure: COMBINED BRONCHOSCOPY, DILATE BRONCHUS, STENT BRONCHUS;  Flexible Bronchoscopy, Bronchial Washings, Balloon Dilation;  Surgeon: Wilber Lin MD;  [facility]     BRONCHOSCOPY, DILATE BRONCHUS, STENT BRONCHUS, COMBINED N/A 8/20/2018    Procedure: COMBINED BRONCHOSCOPY, DILATE BRONCHUS, STENT BRONCHUS;  Flexible Bronchoscopy, Balloon Dilation;  Surgeon: Wilber Lin MD;  [facility]     BRONCHOSCOPY, DILATE BRONCHUS, STENT BRONCHUS, COMBINED N/A 10/29/2018    Procedure: Flexible Bronchoscopy, Balloon Dilation, Stent Revision, Airway Examination And Therapeutic Suctioning, Cyro Tumor Debulking;  Surgeon: Wilber Lin MD;  [facility]     BRONCHOSCOPY, DILATE BRONCHUS, STENT BRONCHUS, COMBINED N/A 11/20/2018    Procedure: Rigid Bronchoscopy, Stent Removal and dilitation;  Surgeon: Wilber Lin MD;  [facility]     BRONCHOSCOPY, DILATE BRONCHUS, STENT BRONCHUS, COMBINED N/A 12/14/2018    Procedure: Flexible And Rigid Bronchoscopy, Balloon Dilation, Bronchial Washing;  Surgeon: Wilber Lin MD;  [facility]     BRONCHOSCOPY, DILATE BRONCHUS, STENT BRONCHUS, COMBINED N/A 1/17/2019    Procedure: Flexible And Rigid Bronchoscopy, Balloon Dilation.;  Surgeon: Wilber Lin MD;  [facility]     BRONCHOSCOPY, DILATE BRONCHUS, STENT BRONCHUS, COMBINED N/A 3/7/2019    Procedure: Flexible and Rigid Bronchoscopy, Bronchial Washing, Balloon Dilation;  Surgeon: Wilber Lin MD;  [facility]     BRONCHOSCOPY, DILATE BRONCHUS, STENT BRONCHUS, COMBINED N/A 6/6/2019    Procedure: Rigid and Flexible Bronchoscopy, Balloon Dilation;  Surgeon: Wilber Lin MD;  [facility]     ENT SURGERY      tonsillectomy as a child     ESOPHAGOSCOPY, GASTROSCOPY, DUODENOSCOPY (EGD), COMBINED N/A 10/29/2018    Procedure: COMBINED ESOPHAGOSCOPY, GASTROSCOPY, DUODENOSCOPY (EGD) with biopsies and polypectomy;  Surgeon: Chente Bloom MD;  [facility]     INSERT EXTRACORPORAL MEMBRANE OXYGENATOR ADULT (OUTSIDE OR) N/A 2/27/2018    Procedure: INSERT EXTRACORPORAL MEMBRANE OXYGENATOR ADULT (OUTSIDE  OR);  INSERT EXTRACORPORAL MEMBRANE OXYGENATOR ADULT (OUTSIDE OR) ;  Surgeon: Toby Hernandez MD;  [facility]     no prior surgery       REMOVE EXTRACORPORAL MEMBRANE OXYGENATOR ADULT N/A 3/3/2018    Procedure: REMOVE EXTRACORPORAL MEMBRANE OXYGENATOR ADULT;  Removal of Right Femoral Venous and Right Axillary Arterial Extracorporeal Membrane Oxygenator;  Surgeon: Toby Hernandez MD;  Location:  OR     TRANSPLANT LUNG RECIPIENT SINGLE X2 Bilateral 3/1/2018    Procedure: TRANSPLANT LUNG RECIPIENT SINGLE X2;  Median Sternotomy, Extracorporeal Membrane Oxygenator, bilateral sequential lung transplant;  Surgeon: Toby Hernandez MD;  [facility]       Family History   Problem Relation Age of Onset     Hypertension Mother      Arthritis Mother      Pancreatic Cancer Father      Diabetes Father        Social History     Social History Narrative    3/6/2019 - Lives with . Has three daughters. Four grandchildren (two ). No pets. Travelled previously to [facility]. Has visited [place] several times.      Social History     Tobacco Use     Smoking status: Never Smoker     Smokeless tobacco: Never Used   Substance Use Topics     Alcohol use: No     Alcohol/week: 0.6 oz     Types: 1 Glasses of wine per week     Drug use: No       Immunization History   Administered Date(s) Administered     Influenza Vaccine IM > 6 months Valent IIV4 10/29/2018     Pneumo Conj 13-V (2010&after) 2016     Pneumococcal 23 valent 2014     TDAP Vaccine (Boostrix) 2018     Tdap (Adult) Unspecified Formulation 2008     Zoster vaccine recombinant adjuvanted (SHINGRIX) 2019, 2019       Patient Active Problem List   Diagnosis     Acute on chronic respiratory failure with hypoxia (H)     ILD (interstitial lung disease) (H)     Lung transplant recipient (H)     Steroid-induced hyperglycemia     Deep vein thrombosis (DVT) (H) [I82.409]     Encounter for  long-term (current) use of high-risk medication     Dehydration     Acute kidney injury (H)     CMV (cytomegalovirus) (H)     Nausea and vomiting     Low hemoglobin     Cholecystitis            Current Medications & Allergies:       amLODIPine  10 mg Oral Daily     calcium carbonate 600 mg-vitamin D 400 units  1 tablet Oral Daily     ferrous sulfate  325 mg Oral Daily with breakfast     magnesium oxide  400 mg Oral BID     metoprolol tartrate  50 mg Oral BID     multivitamin w/minerals  1 tablet Oral Daily     pantoprazole  40 mg Oral BID     piperacillin-tazobactam  2.25 g Intravenous Q6H     polyethylene glycol  17 g Oral BID     predniSONE  2.5 mg Oral QPM     predniSONE  5 mg Oral Daily     senna-docusate  2 tablet Oral BID     sodium chloride (PF)  3 mL Intracatheter Q8H     sulfamethoxazole-trimethoprim  1 tablet Oral Daily     tacrolimus  1.5 mg Oral QPM     tacrolimus  2 mg Oral QAM            Physical Exam:   Ranges for vital signs:  Temp:  [97.7  F (36.5  C)-98.9  F (37.2  C)] 98.9  F (37.2  C)  Pulse:  [79-88] 88  Resp:  [16-18] 18  BP: (127-141)/(78-85) 141/81  SpO2:  [94 %-100 %] 94 %  Vitals:    09/11/19 2044 09/12/19 0355 09/13/19 0245   Weight: 54.4 kg (120 lb) 55.1 kg (121 lb 8 oz) 55.6 kg (122 lb 9.6 oz)       Physical Examination:  GENERAL:  well-developed, well-nourished, in bed in no acute distress.  HEAD:  Head is normocephalic, atraumatic   EYES:  Eyes have anicteric sclerae without conjunctival injection   ENT:  Oropharynx is moist without exudates or ulcers. Prominent parotid glands.   NECK:  Supple. No cervical lymphadenopathy  LUNGS:  Decreased breath sounds in L base.   CARDIOVASCULAR:  Regular rate and rhythm with no murmurs, gallops or rubs.  ABDOMEN:  Normal bowel sounds, soft, nontender. No appreciable hepatosplenomegaly.  SKIN:  No acute rashes.    NEUROLOGIC:  Grossly nonfocal. Active x4 extremities         Laboratory Data:     Inflammatory Markers    Recent Labs   Lab Test  09/13/19 0934 09/11/19 2325 08/22/19  0820   * 102*  --    CRP 58.0* 66.0* 47.0*       Immune Globulin Studies     Recent Labs   Lab Test 03/01/18  0356 02/19/18  0759    1,790*   IGM  --  502*   IGE  --  <2   IGA  --  425*   IGG1  --  1,300*   IGG2  --  131*   IGG3  --  101   IGG4  --  1*       Metabolic Studies       Recent Labs   Lab Test 09/13/19  0536 09/11/19 2325 08/13/19 08/07/19  0959  03/04/18  1953  03/04/18  0353  03/01/18  2017  02/27/18  0537    133 138 133   < > 150*   < > 150*   < > 150*   < >  --    POTASSIUM 4.5 4.2 4.1 4.2   < > 4.2   < > 4.2   < > 3.4   < >  --    CHLORIDE 105 100 106 98   < > 119*   < > 117*   < > 119*   < >  --    CO2 22 22 22 29   < > 22   < > 20   < > 24   < >  --    ANIONGAP 8 11 14.1 6   < > 9   < > 13   < > 7   < >  --    BUN 31* 24 39.3*  24.56 27   < > 71*   < > 69*   < > 30   < >  --    CR 1.51* 1.28* 1.6 1.80*   < > 2.13*   < > 2.14*   < > 0.95   < > 0.97   GFRESTIMATED 38* 46* 36* 31*   < > 24*   < > 24*   < > 61   < > 60*   * 76 162* 119*   < > 111*   < > 133*   < > 232*   < >  --    A1C  --   --   --   --   --   --   --   --   --  5.5   < >  --    CHELSEY 8.5 9.2 8.9 9.8   < > 7.2*   < > 7.8*   < > 6.8*   < >  --    PHOS  --   --  2.6  --    < >  --   --  5.0*   < > 3.9   < >  --    MAG  --   --  1.7 2.2   < > 2.5*  --  2.8*   < > 1.9   < >  --    LACT  --  1.0  --   --   --  0.8   < > 1.0   < > 1.8   < >  --    PCAL  --   --   --   --   --   --   --   --   --   --   --  <0.05   CKT  --   --   --  36  --   --   --  207  --   --   --   --     < > = values in this interval not displayed.       Hepatic Studies    Recent Labs   Lab Test 09/13/19  0536 09/11/19  2325 01/16/19  0900 12/13/18  1033  08/03/18  0624 08/02/18  1151   BILITOTAL 0.3 0.4 0.3  --    < > 0.3 0.2   DBIL  --   --   --   --   --  <0.1 0.1   ALKPHOS 212* 255* 65  --    < > 72 87   PROTTOTAL 7.0 8.3 7.4  --    < > 5.7* 6.2*   ALBUMIN 2.3* 2.8* 2.8*  --    < > 2.5* 2.6*   AST  21 29 12  --    < > 8 15   ALT 14 17 11  --    < > 18 21   LDH  --   --   --  197  --   --  200    < > = values in this interval not displayed.       Pancreatitis testing    Recent Labs   Lab Test 09/11/19 2325 03/04/18  0353 02/19/18  0759   AMYLASE  --   --  58   LIPASE 127  --   --    TRIG  --  191* 115     Hematology Studies      Recent Labs   Lab Test 09/13/19 0536 09/11/19 2325 08/07/19 0959 06/18/19 06/05/19  0922 03/05/19  0932   WBC 5.0 6.1  --  4.8 4.2* 3.2* 3.2*   ANEU  --  5.0  --  3.3  --   --   --    ALYM  --  0.5*  --  0.9  --   --   --    SHERRI  --  0.5  --  0.6  --   --   --    AEOS  --  0.0  --  0.0  --   --   --    HGB 8.0* 9.1*   < > 9.9* 9.8* 9.9* 8.1*   HCT 26.8* 30.8*   < > 32.3* 31.8* 32.8* 27.3*    189   < > 194  --  278 332    < > = values in this interval not displayed.       Clotting Studies    Recent Labs   Lab Test 09/11/19 2325 08/22/19 0820 08/07/19 0959 06/05/19  0922  08/03/18  0624   INR 1.06 0.94 0.91 1.01   < > 1.09   PTT  --   --   --   --   --  26    < > = values in this interval not displayed.       Iron Testing    Recent Labs   Lab Test 09/13/19 0536 12/13/18  1033  08/20/18  0553  08/01/18  0921  05/08/18  0709   IRON  --   --  16*  --   --   --  93  --  48   FEB  --   --  221*  --   --   --  248  --  275   IRONSAT  --   --  7*  --   --   --  37  --  18   YOLA  --   --  302*  --   --   --  571*   < >  --    MCV 92   < > 107*   < >  --    < > 101*   < > 102*   FOLIC  --   --   --   --  43.4  --   --   --  34.3   B12  --   --   --   --   --   --  1,753*  --  2,247*   HAPT  --   --  296*  --   --   --   --   --   --    RETP  --   --  4.6*  --   --    < >  --   --   --    RETICABSCT  --   --  94.2  --   --    < >  --   --   --     < > = values in this interval not displayed.     Autoimmune Testing    Recent Labs   Lab Test 05/16/18  1006 02/22/18  1800   A9HEJDC 113  --    RNPIGG 0.2  --    SMIGG <0.2  --    SSAIGG 2.8*  --    SSBIGG 7.3*  --    SCLIGG <0.2 <0.2        Arterial Blood Gas Testing    Recent Labs   Lab Test 03/07/18  0355 03/07/18  0354 03/07/18  0010 03/06/18  2208  03/06/18  1905 03/06/18  1822  03/06/18  1509   PH  --  7.47*  --  7.45  --  7.43 7.43  --  7.41   PCO2  --  35  --  36  --  36 36  --  36   PO2  --  113*  --  159*  --  156* 142*  --  215*   HCO3  --  26  --  25  --  24 24  --  23   O2PER 2L 2L 2L 3L   < > 40 40   < > 50    < > = values in this interval not displayed.        Thyroid Studies     Recent Labs   Lab Test 08/01/18  0921 02/19/18  0759   TSH 1.80 3.07       Urine Studies     Recent Labs   Lab Test 09/12/19  0125 08/07/19  1512 03/04/18  1425 03/01/18  0528 02/23/18  1820   URINEPH 7.5* 7.0 5.5 5.5 6.5   NITRITE Negative Negative Negative Negative Negative   LEUKEST Negative Trace* Negative Negative Negative   WBCU 3 19* 5 4 <1       Medication levels    Recent Labs   Lab Test 09/13/19  0536  03/10/18  1710   VANCOMYCIN  --   --  26.4*   TACROL 9.5   < >  --     < > = values in this interval not displayed.       Body fluid stats    Recent Labs   Lab Test 09/12/19  1220  01/17/19  1207 12/14/18  1054 11/20/18  0855  03/22/18  1049   FTYP  --   --  Right Bronchial washing Bronchial washing   < > Pleural fluid   FCOL  --   --  Pale Yellow Pink Pink   < > Red   FAPR  --   --  Cloudy Turbid Cloudy   < > Cloudy   FRBC  --   --   --  << Do Not Report >>  --   --   --    FWBC  --   --  1500 9520 2625   < > 2373   FNEU  --   --  87 94 90   < > 87   FLYM  --   --   --  1 6   < > 6   FMONO  --   --  12 4 4   < > 7   FTP  --   --   --   --   --   --  3.1   GS <10 Squamous epithelial cells/low power field  <25 PMNs/low power field  Few  Mixed gram negative and positive alo     < > >25 PMNs/low power field  No organisms seen >25 PMNs/low power field  Many  Gram negative diplococci  *  Moderate  Gram positive cocci  * >25 PMNs/low power field  Few  Gram positive cocci  *   < > Few  WBC'S seen  predominantly PMN's    No organisms seen    < > =  values in this interval not displayed.       Microbiology:  Last Culture results with specimen source  Culture Micro   Date Value Ref Range Status   09/12/2019 Moderate growth  Normal alo to date    Preliminary   09/12/2019 Culture in progress  Preliminary   09/12/2019 Culture negative after 20 hours  Preliminary   09/12/2019 No growth after 1 day  Preliminary   09/11/2019 No growth after 1 day  Preliminary   03/07/2019 No Actinomyces species isolated  Final   03/07/2019 Culture negative for acid fast bacilli  Final   03/07/2019   Final    Assayed at [facility] [address] [place] [address] [phone]   03/07/2019 Culture negative after 4 weeks  Final   03/07/2019 No growth after 4 weeks  Final   03/07/2019 Moderate growth  Normal respiratory alo    Final   01/17/2019 (A)  Final    Moderate growth  Actinomyces odontolyticus  This organism is part of normal alo, but on occasion, may be a true pathogen. Clinical   correlation must be applied to interpreting this microbiology result.  Susceptibility testing not routinely done     01/17/2019 Heavy growth  Normal respiratory alo    Final   01/17/2019 Culture negative for acid fast bacilli  Final   01/17/2019   Final    Assayed at [facility] [address] [place] [address] [phone]   01/17/2019 Culture negative after 4 weeks  Final   01/17/2019 No growth after 4 weeks  Final   01/17/2019 Moderate growth  Normal respiratory alo    Final   12/14/2018 (A)  Final    Moderate growth  Actinomyces odontolyticus  Susceptibility testing not routinely done     12/14/2018 Light growth  Normal alo    Final   12/14/2018 Culture negative for acid fast bacilli  Final   12/14/2018   Final    Assayed at [facility] [address] [place] [address] [phone]   12/14/2018 Candida glabrata  isolated   (A)  Final   12/14/2018   Final    No additional fungi cultured after 4 weeks incubation   12/14/2018 No growth after 4 weeks  Final    12/14/2018 Moderate growth  Normal alo    Final   12/14/2018 (A)  Final    Heavy growth  Moraxella (Branhamella) catarrhalis  Beta lactamase positive      Specimen Description   Date Value Ref Range Status   09/12/2019 Sputum Screen  Final   09/12/2019 Sputum Screen  Final   09/12/2019 Sputum Screen  Final   09/12/2019 Blood Left Arm  Final   09/11/2019 Blood Right Arm  Final   03/07/2019 Bronchial washing  Final   03/07/2019 Bronchial washing  Final   03/07/2019 Bronchial washing  Final   03/07/2019 Bronchial washing  Final   03/07/2019 Bronchial washing  Final   03/07/2019 Bronchial washing  Final   03/07/2019 Bronchial washing  Final   03/07/2019 Bronchial washing  Final   01/17/2019 Bronchial washing Right SPECIMEN 1  Final   01/17/2019 Right BROW  Final   01/17/2019 Right BROW  Final   01/17/2019 Bronchial washing Right SPECIMEN 1  Final   01/17/2019 Bronchial washing Right SPECIMEN 1  Final   01/17/2019 Bronchial washing Right SPECIMEN 1  Final   01/17/2019 Bronchial washing Right SPECIMEN 1  Final   01/17/2019 Bronchial washing Right SPECIMEN 1  Final        Last check of C difficile  C Diff Toxin B PCR   Date Value Ref Range Status   12/04/2018 Negative NEG^Negative Final     Comment:     Negative: Clostridium difficile target DNA sequences NOT detected, presumed   negative for Clostridium difficile toxin B or the number of bacteria present   may be below the limit of detection for the test.  FDA approved assay performed using [employer] GeneXpert real-time PCR.  A negative result does not exclude actual disease due to Clostridium difficile   and may be due to improper collection, handling and storage of the specimen   or the number of organisms in the specimen is below the detection limit of the   assay.       Quantiferon testing   Recent Labs   Lab Test 03/07/19  1004 01/17/19  1207 12/14/18  1054 11/20/18  0855  02/19/18  0759   TBRSLT  --   --   --   --   --  Negative   TBAGN  --   --   --   --   --   0.00   AFBSMS Negative for acid fast bacteria  Less than 5ml of specimen received.  A minimum of 5 mL of sputum or fluid is recommended for recovery of acid fast bacilli   (AFB).  Volumes less than 5 mL are suboptimal and may compromise recovery of AFB from   culture.    Assayed at [facility], [address], [place] [address] [phone] Negative for acid fast bacteria  Less than 5ml of specimen received.  A minimum of 5 mL of sputum or fluid is recommended for recovery of acid fast bacilli   (AFB).  Volumes less than 5 mL are suboptimal and may compromise recovery of AFB from   culture.    Assayed at [facility], [address] [place] [address] [phone] Negative for acid fast bacteria  Less than 5ml of specimen received.  A minimum of 5 mL of sputum or fluid is recommended for recovery of acid fast bacilli   (AFB).  Volumes less than 5 mL are suboptimal and may compromise recovery of AFB from   culture.    Assayed at [facility], [address] [place] [address] [phone] Negative for acid fast bacteria  Less than 5ml of specimen received.  A minimum of 5 mL of sputum or fluid is recommended for recovery of acid fast bacilli   (AFB).  Volumes less than 5 mL are suboptimal and may compromise recovery of AFB from   culture.    Assayed at [facility] ([facility], [address] [place] [address] [phone]   < >  --     < > = values in this interval not displayed.       Virology:  CMV viral loads    Recent Labs   Lab Test 06/05/19  1155 03/07/19  1004 01/17/19  1207 01/16/19  0900 12/14/18  1054   CSPEC EDTA PLASMA Bronchial washing Right Plasma, EDTA anticoagulant Bronchial washing   CMVLOG <2.1 2.7* Not Calculated <2.1 2.7*       Log IU/mL of CMVQNT   Date Value Ref Range Status   06/05/2019 <2.1 <2.1 [Log_IU]/mL Final   03/07/2019 2.7 (H) <2.1 [Log_IU]/mL Final   01/17/2019 Not Calculated <2.1 [Log_IU]/mL Final   01/16/2019 <2.1 <2.1 [Log_IU]/mL Final   12/14/2018  2.7 (H) <2.1 [Log_IU]/mL Final   12/13/2018 <2.1 <2.1 [Log_IU]/mL Final   12/04/2018 <2.1 <2.1 [Log_IU]/mL Final   11/20/2018 3.2 (H) <2.1 [Log_IU]/mL Final   11/19/2018 Not Calculated <2.1 [Log_IU]/mL Final   10/29/2018 3.8 (H) <2.1 [Log_IU]/mL Final   10/09/2018 <2.1 <2.1 [Log_IU]/mL Final   09/10/2018 <2.1 <2.1 [Log_IU]/mL Final   09/04/2018 <2.1 <2.1 [Log_IU]/mL Final   08/20/2018 2.8 (H) <2.1 [Log_IU]/mL Final   08/02/2018 6.5 (H) <2.1 [Log_IU]/mL Final   08/01/2018 3.8 (H) <2.1 [Log_IU]/mL Final   07/10/2018 4.3 (H) <2.1 [Log_IU]/mL Final   07/10/2018 4.0 (H) <2.1 [Log_IU]/mL Final   07/09/2018 2.4 (H) <2.1 [Log_IU]/mL Final   06/05/2018 Not Calculated <2.1 [Log_IU]/mL Final   05/16/2018 Not Calculated <2.1 [Log_IU]/mL Final   05/08/2018 Not Calculated <2.1 [Log_IU]/mL Final   04/19/2018 Not Calculated <2.1 [Log_IU]/mL Final   04/18/2018 Not Calculated <2.1 [Log_IU]/mL Final   04/16/2018 Not Calculated <2.1 [Log_IU]/mL Final   04/10/2018 Not Calculated <2.1 [Log_IU]/mL Final   04/09/2018 Not Calculated <2.1 [Log_IU]/mL Final   04/05/2018 Not Calculated <2.1 [Log_IU]/mL Final   04/03/2018 Not Calculated <2.1 [Log_IU]/mL Final   03/30/2018 Not Calculated <2.1 [Log_IU]/mL Final       No results found for: H6RES    EBV DNA Copies/mL   Date Value Ref Range Status   08/01/2018 EBV DNA Not Detected EBVNEG^EBV DNA Not Detected [Copies]/mL Final       BK Virus Testing     BK Virus Result   Date Value Ref Range Status   08/07/2019 BK Virus DNA Not Detected BKNEG^BK Virus DNA Not Detected copies/mL Final     Hepatitis B Testing     Recent Labs   Lab Test 06/05/19  1156 06/05/18  1029 03/01/18  0356 02/19/18  0759   AUSAB  --   --  0.41 0.64   HBCAB Nonreactive  --  Nonreactive Nonreactive   HEPBANG Nonreactive Nonreactive Nonreactive Nonreactive        Hepatitis C Antibody   Date Value Ref Range Status   06/05/2019 Nonreactive NR^Nonreactive Final     Comment:     Assay performance characteristics have not been  established for newborns,   infants, and children     02/19/2018 Nonreactive NR^Nonreactive Final     Comment:     Assay performance characteristics have not been established for newborns,   infants, and children         CMV Antibody IgG   Date Value Ref Range Status   03/01/2018 Canceled, Test credited 0.0 - 0.8 AI Final     Comment:     Test reordered as correct code  SEE CMV QUANTITATIVE PCR     03/01/2018 >8.0 (H) 0.0 - 0.8 AI Final     Comment:     Positive  Antibody index (AI) values reflect qualitative changes in antibody   concentration that cannot be directly associated with clinical condition or   disease state.     02/19/2018 >8.0 (H) 0.0 - 0.8 AI Final     Comment:     Positive  Antibody index (AI) values reflect qualitative changes in antibody   concentration that cannot be directly associated with clinical condition or   disease state.       Varicella Zoster Virus Antibody IgG   Date Value Ref Range Status   02/19/2018 3.8 (H) 0.0 - 0.8 AI Final     Comment:     Positive, suggests prev. exposure and probable immunity  Antibody index (AI) values reflect qualitative changes in antibody   concentration that cannot be directly associated with clinical condition or   disease state.       EBV Capsid Antibody IgG   Date Value Ref Range Status   03/01/2018 >8.0 (H) 0.0 - 0.8 AI Final     Comment:     Positive, suggests recent or past exposure  Antibody index (AI) values reflect qualitative changes in antibody   concentration that cannot be directly associated with clinical condition or   disease state.     02/19/2018 >8.0 (H) 0.0 - 0.8 AI Final     Comment:     Positive, suggests recent or past exposure  Antibody index (AI) values reflect qualitative changes in antibody   concentration that cannot be directly associated with clinical condition or   disease state.       Toxoplasma Antibody IgG   Date Value Ref Range Status   02/19/2018 <3.0 0.0 - 7.1 IU/mL Final     Comment:     Negative- Absence of detectable  Toxoplasma gondii IgG antibodies. A negative   result does not rule out acute infection.  The magnitude of the measured result is not indicative of the amount of   antibody present. The concentrations of anti-Toxoplasma gondii IgG in a given   specimen determined with assays from different manufacturers can vary due to   differences in assay methods and reagent specificity.       Herpes Simplex Virus Type 1 IgG   Date Value Ref Range Status   03/01/2018 >8.0 (H) 0.0 - 0.8 AI Final     Comment:     Positive.  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  Antibody index (AI) values reflect qualitative changes in antibody   concentration that cannot be directly associated with clinical condition or   disease state.     02/19/2018 >8.0 (H) 0.0 - 0.8 AI Final     Comment:     Positive.  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  Antibody index (AI) values reflect qualitative changes in antibody   concentration that cannot be directly associated with clinical condition or   disease state.       Herpes Simplex Virus Type 2 IgG   Date Value Ref Range Status   03/01/2018 <0.2 0.0 - 0.8 AI Final     Comment:     No HSV-2 IgG antibodies detected.  Antibody index (AI) values reflect qualitative changes in antibody   concentration that cannot be directly associated with clinical condition or   disease state.     02/19/2018 <0.2 0.0 - 0.8 AI Final     Comment:     No HSV-2 IgG antibodies detected.  Antibody index (AI) values reflect qualitative changes in antibody   concentration that cannot be directly associated with clinical condition or   disease state.         Imaging:  Recent Results (from the past 48 hour(s))   US Abdomen Limited (RUQ)    Narrative    EXAMINATION: Limited Abdominal Ultrasound, 9/11/2019 11:50 PM     COMPARISON: CT abdomen 8/20/2018    HISTORY: Transfer from outside hospital for management of possible  acute cholecystitis    FINDINGS:   Fluid: No evidence of ascites or pleural effusions.    Liver: The liver demonstrates normal  echotexture, measuring 18.9 cm in  craniocaudal dimension. There is no focal mass.     Gallbladder: The gallbladder wall is thickened to 4 mm, however is not  significantly hyperemic on color Doppler images. Trace pericholecystic  edema Biliary sludge and cholelithiasis, including stone at the  gallbladder neck.    Bile ducts: The proximal extra hepatic bile duct is dilated to 11 mm,  increased from 8/20/2018. No intrahepatic biliary ductal dilatation.    Pancreas: Obscured by shadowing bowel gas.    Kidney: The right kidney measures 9.0 cm long. There is no  hydronephrosis or hydroureter, no shadowing renal calculi, cystic  lesion or mass.       Impression    IMPRESSION:   1.  In the proper clinical setting, findings concerning for acute  cholecystitis, including gallbladder wall thickening, pericholecystic  edema, and multiple stones at the gallbladder neck.  2.  Distention of the extrahepatic bile ducts to 11 mm, increased from  8/20/2018, possibly from distal choledocholithiasis (the distal ducts  are obscured) or recently passed stone.     I have personally reviewed the examination and initial interpretation  and I agree with the findings.    YONATAN HERNANDES MD   XR Abdomen Port 1 View    Narrative    Examination:  XR ABDOMEN PORT 1 VW    Date:  9/12/2019 11:04 AM     Clinical Information: 57 y/o female with no BM for 2 weeks - evaluate  stool burden     Findings:     Single supine radiograph of the abdomen. Dilation of small and large  bowel with normal caliber ascending and descending colon. Moderate  stool burden. The bowel within the pelvis.Phleboliths in the pelvis.  No acute osseous abnormalities.       Impression    Impression:  1. Dilation of small and large bowel. Suggestive of adynamic ileus.  Nonspecific bowel gas pattern, if further clinical concern consider  CT.  2. Moderate stool burden.     I have personally reviewed the examination and initial interpretation  and I agree with the  findings.    MEHNAZ CHAPMAN MD   XR Chest 2 Views    Narrative    XR CHEST 2 VW  9/12/2019 12:42 PM      HISTORY: S/P lung transplant    COMPARISON: 8/7/2019, 6/5/2019    FINDINGS: Single view of the chest. Status post bilateral lung  transplants. Trachea is midline, heart size is stable. Slightly  increased mild perihilar interstitial streaky opacities. No pleural  effusion or pneumothorax. No acute osseous or upper abdominal  abnormality.      Impression    IMPRESSION:  1. Status post bilateral lung transplant.  2. Cardiomegaly with slight increase of bilateral perihilar streaky  opacities, favor mild pulmonary edema.    I have personally reviewed the examination and initial interpretation  and I agree with the findings.    ADAM GONZALEZ MD   MR Abdomen MRCP w/o & w Contrast   Result Value    Radiologist flags Possible empyema versus abscess at the left (Urgent)    Narrative    MRI ABDOMEN    CLINICAL HISTORY:  Dilated common bile duct on ultrasound exam.    TECHNIQUE:  Images were acquired with and without intravenous contrast  through the abdomen. The following MR images were acquired: TrueFISP,  multiplanar T2 weighted, axial T1 in/out of phase, axial fat-saturated  T1, diffusion-weighted. Multiplanar T1-weighted images with fat  saturation were obtained before contrast administration and at  multiple time points following the administration of intravenous  contrast. Contrast dose: 7.5ml of GADAVIST    FINDINGS:    Comparison study: Ultrasound abdomen 9/11/2019.    Liver: Normal T1 and T2 signal in the liver. Small cyst in the  posterior hepatic dome. No evidence of signal dropout on out of phase  imaging.    Gallbladder/biliary tree: The gallbladder is dilated to 4.1 cm in  transverse diameter with surrounding pericholecystic fluid. Multiple  dependent stones in the gallbladder. The common bile duct is dilated  at 12 mm.    Spleen: The spleen is the upper limits of normal at 12.8 cm in  craniocaudal  dimension.    Kidneys: Bilateral simple renal cysts. No hydronephrosis.    Adrenal glands: Unremarkable    Pancreas: Normal appearance of the pancreas.    Bowel: Normal diameter small bowel and colon. No diverticulitis.    Lymph nodes: No lymphadenopathy by size criteria.    Blood vessels: Patent major abdominal vasculature.    Lung bases: Rim-enhancing fluid-filled structure in the posterior left  hemithorax measuring 4.5 x 3.0 cm (series 22 image 14 and series 9  image 27). Small bilateral pleural effusions.    Bones and soft tissues: Normal bone marrow signal.    Ascites: Small amount of ascites.      Impression    IMPRESSION:   1. Rim-enhancing fluid-filled structure in the posterior left  hemithorax measuring 4.5 x 3.0 cm (series 22 image 14 and series 9  image 27). Findings are concerning for abscess versus empyema.  Follow-up is recommended using chest CT until resolution.  2. Distended gallbladder with pericholecystic fluid, gallstones, and  dilated common bile duct at 12 mm. No choledocholithiasis or  obstructing mass.    [Urgent Result: Possible empyema versus abscess at the left  costophrenic angle]    Finding was identified on 9/12/2019 5:33 PM.     Thi was contacted by Dr. James at 9/12/2019 5:48 PM and verbalized  understanding of the urgent finding.    I have personally reviewed the examination and initial interpretation  and I agree with the findings.    DEAN PARKER MD   CT Chest w Contrast    Narrative    EXAMINATION: Chest CT  9/12/2019 6:51 PM    CLINICAL HISTORY: 67-year-old post lung transplant, fluid collection  on MRCP    COMPARISON: MRCP today.    TECHNIQUE: CT imaging obtained through the chest with contrast.  Coronal and axial MIP reformatted images obtained.     FINDINGS:  Postsurgical changes of bilateral lung consolidation. Normal-sized  heart and great vessels. No mediastinal lymphadenopathy.    There is a 4.2 x 2.5 cm rim enhancing fluid collection in the in the  medial left  costophrenic angle (series 2 image 40 and sagittal image  46). The collection surrounds the anterior surface of the the left  10th rib (series 7 image 46) with no fat plane between, with possible  erosion into the bone. Small bilateral pleural effusions.    No suspicious pulmonary nodule. Atelectasis in the posterior lower  lobes.    Bones and soft tissues: Mild periosteal reaction and possible erosions  in the posterior left 10th rib seen best on the sagittal views.    Partially imaged upper abdomen: Cholelithiasis and dilated common bile  duct and gallbladder.      Impression    IMPRESSION:  There is a 4.2 x 2.5 cm empyema in the medial left costophrenic angle,  possibly complicated by left 10th rib osteomyelitis.    I have personally reviewed the examination and initial interpretation  and I agree with the findings.    DEAN PARKER MD

## 2019-09-13 NOTE — CONSULTS
INTERVENTIONAL RADIOLOGY CONSULT NOTE    Patient is a 58 year old female with history of b/l lung transplant for ILD admitted with cholecystitis. Concern of left empyema. Request for chest tube placement.      Patient is on IR schedule Friday 9/13/19 for a possible left chest tube placement. Imaging shows a small left effusion, if when IR assesses with US team is requesting attempted diagnostic thoracentesis.   Labs WNL for procedure.    No NPO required.  Medications to be held include: None  Consent will be done prior to procedure.    Platelet Count   Date Value Ref Range Status   09/13/2019 186 150 - 450 10e9/L Final     INR   Date Value Ref Range Status   09/11/2019 1.06 0.86 - 1.14 Final        Please contact the IR charge RN at [phone] for estimated time of procedure.     Case discussed with Dr. Mckeon and Gabrielle [phone].    Debra Inman PA-C  Interventional Radiology

## 2019-09-13 NOTE — PROCEDURES
IR asked to perform left chest tube placement and or diagnostic thoracentesis due to left pleural effusion concerning for empyema. No diagnostic fluid able to be obtained using 2 separate punctures.    Kenneth Sparrow PA-C  Interventional Radiology  [phone]

## 2019-09-13 NOTE — PLAN OF CARE
D: Admitted from OSH for possible cholecystitis and ileus. Hx includes bilateral lung txp (3/2018) for ILD, anti-synthetase syndrome, dermatomyositis, pulmonary hypertension, chronic cough, seronegative RA.      I: Monitored vitals and assessed pt status.   Running: SL  PRN: Pink Lady enema given  Tele: No tele orders  O2: RA  Mobility: Independent     A: A0x4. VSS. Afebrile. Urinating adequately not saving, but pt reports normal UOP. Pt had MRCP and CT yesterday. NPO for possible ERCP with BG stent tomorrow. Pt had large loose result from enema and feels relief. Pt educated about use of opioids with ileus.      P: Continue to monitor Pt status and report changes to [facility]

## 2019-09-13 NOTE — PROGRESS NOTES
Patient Name: Kecia Blue  Medical Record Number: [id number]  Today's Date: 9/13/2019    Procedure: chest tube placement, thoracentesis.  Proceduralist: Kenneth Sparrow PA-C    No sedation per providers    Patient in room: 1345  Procedure start time: 1402  Puncture time: 1403  Procedure end time: 1426  Patient out of room: 1431    Report given to: 6C RN     Other Notes: Pt arrived to IR room 2  from 3378. Consent reviewed. Pt denies any questions or concerns regarding procedure. Pt positioned sitting upright and monitored per protocol. Pt tolerated procedure without any noted complications. Pt transferred back to .

## 2019-09-13 NOTE — CONSULTS
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 Rheumatology is being consulted for evaluation of bilateral knee pain, significantly limiting function. Knee exam with prominent enlarged knees R>L without obvious effusion and interestingly no tenderness and no erythema but +warmth, but she does have a significant fullness in the medial aspect of the bilateral knees. No other signs of active joint inflammation or skin rashes at this time. CRP and ESR elevated but they are difficult to interpret in the setting of underlying infection. Recent imaging in [facility] was significant for consolidated soft tissue mass involving the entire suprapatellar pouch of the bilateral knees, likely secondary to overgrowth of the synovium. It is possible that there is an underlying inflammatory component to her knee pain but suspect that this soft tissue prominence is the primary cause of her limitation, especially given the lack of other joint findings and lack of pain with palpation of her knees. Typically this is managed surgically and we recommend orthopedic evaluation. Could consider the addition of plaquenil in the future if inflammatory arthropathy continues to cause her significant discomfort and limitations.    Recommendations:  - Orthopedic Consultations  - Consider further imaging of her R knee with MRI (left knee MRI was completed at outside facility)    The patient was seen and staffed with Dr. Nevarez.     Chano Smith MD  Internal Medicine PGY3    Attending Note: I saw and evaluated the patient with  Dr. Singh. I agree with the assessment and plan. Very complicated patient, interesting physical finding with B/L  R>L knee enlargement x months (not acute) without any tenderness on joint palpation. This is not suggestive of inflammatory arthritis/gout or septic joint. The limited ROM could be due to synovium overgrowth/?synovial chondromatosis/?chronic synovial hypertrophy due to chronic inflammation/?pigmented villonodular synovitis. DDx is based on MRI and exam findings. Would like to get ortho's opinion.    If this is synovial chondromatosis, Tx is surgery.    Sam Nevarez MD  Rheumatology staff           Chief Complaint:   Right Knee pain, swelling         History of Present Illness:   Kecia Blue is a 58-year-old female with a history of bilateral lung transplant (3-2018) for interstitial lung disease, dermatomyositis, seronegative RA, CMV, anti-synthetase syndrome who presented to the hospital for evaluation of abdominal pain.  Rheumatology has been consulted for right knee pain and swelling concerning for a flare of her underlying dermatomyositis and seronegative RA.  Initially her abdominal pain was thought to be secondary to cholecystitis versus choledocholithiasis.  However on MRCP she was incidentally noted to have a left-sided empyema which is thought to be more consistent with the cause of her abdominal pain.  She has been treated with broad-spectrum antibiotics and is currently undergoing evaluation with interventional radiology for diagnostic thoracentesis plus possible chest tube.    Regarding her rheumatologic history, in 2014 she was diagnosed with dermatomyositis after presenting with Gottron's papules, a rash on her face and knuckles as well as swelling of her small joints in her hands.  Serum markers were positive for SSA and SSB.  She has had a positive BENJAMÍN in the past, however her most recent in May 2018 was negative.  Initially she was treated with MMF without significant improvement.   She received 1 dose of rituximab, but did not continue with that therapy.  Eventually she was started on Imuran with significant improvement of her skin and joint symptoms.  Her respiratory status continued to worsen however, she was started on tacrolimus for ILD.  Eventually she underwent bilateral lung transplant.  Currently she is only taking tacrolimus as well as 5 mg of prednisone daily.     She has had knee pain and swelling for the past few months. Her pain has been severely limiting. At this time she struggles with ambulation and cannot climb any stairs. She describes it as a stiffness. It is worse in the morning after waking up. Typically takes 5-10 minutes to loosen up. Intermittently has redness of the overlying skin. Both knees are effected equally. She denies any other joint involvement, nor any further skin lesions. She has seen an orthopedist as an outpatient who recommended a total knee arthoplasty. She has gotten injections in the past few months which was only helpful for about 1 week (last one in R knee) before she experience significant limitations again. Reportedly arthrocentesis was attempted with not much fluid aspirated out of R knee, does not know what it showed.         Past Medical History:     Past Medical History:   Diagnosis Date     Antisynthetase syndrome (H) 2014     Chronic cough      Chronic infection     recent C diff  8/18     Dehydration 8/1/2018     Dermatomyositis (H)      Dysplasia of cervix, low grade (ESTRADA 1)      ILD (interstitial lung disease) (H)      Osteopenia      PONV (postoperative nausea and vomiting)      Pulmonary hypertension (H)      Raynaud's disease      Seronegative rheumatoid arthritis (H)              Past Surgical History:     Past Surgical History:   Procedure Laterality Date     BRONCHOSCOPY (RIGID OR FLEXIBLE), DIAGNOSTIC N/A 4/10/2018    Procedure: COMBINED BRONCHOSCOPY (RIGID OR FLEXIBLE), LAVAGE;;  Surgeon: Mariposa Donohue MD;  Location: [facility]      BRONCHOSCOPY (RIGID OR FLEXIBLE), DILATE BRONCHUS / TRACHEA N/A 10/11/2018    Procedure: BRONCHOSCOPY (RIGID OR FLEXIBLE), DILATE BRONCHUS / TRACHEA;  Flexible And Rigid Bronchoscopy and Dilation;  Surgeon: Wilber Lin MD;  Location: [facility]     BRONCHOSCOPY FLEXIBLE N/A 3/13/2018    Procedure: BRONCHOSCOPY FLEXIBLE;  Flexible Bronchoscopy ;  Surgeon: Gissell Sanchez MD;  Location: [facility]     BRONCHOSCOPY FLEXIBLE N/A 5/9/2018    Procedure: BRONCHOSCOPY FLEXIBLE;;  Surgeon: Wilber Lin MD;  Location: [facility]     BRONCHOSCOPY FLEXIBLE AND RIGID N/A 9/10/2018    Procedure: BRONCHOSCOPY FLEXIBLE AND RIGID;  Flexible and Rigid Bronchoscopy with Balloon Dilation, tissue debulking with cryo, and Right mainstem bronchus stent placement;  Surgeon: Wilber Lin MD;  Location: [facility]     BRONCHOSCOPY RIGID N/A 6/6/2018    Procedure: BRONCHOSCOPY RIGID;;  Surgeon: Lopez Macias MD;  Location: [facility]     BRONCHOSCOPY, DILATE BRONCHUS, STENT BRONCHUS, COMBINED N/A 6/11/2018    Procedure: COMBINED BRONCHOSCOPY, DILATE BRONCHUS, STENT BRONCHUS;  Flexible Bronchoscopy, Balloon Dilation, Bronchial Washings;  Surgeon: Wilber Lin MD;  Location: [facility]     BRONCHOSCOPY, DILATE BRONCHUS, STENT BRONCHUS, COMBINED Right 7/10/2018    Procedure: COMBINED BRONCHOSCOPY, DILATE BRONCHUS, STENT BRONCHUS;  Flexible Bronchoscopy, Balloon Dilation, Bronchial Washings  ;  Surgeon: Wilber Lin MD;  Location: [facility]     BRONCHOSCOPY, DILATE BRONCHUS, STENT BRONCHUS, COMBINED N/A 8/2/2018    Procedure: COMBINED BRONCHOSCOPY, DILATE BRONCHUS, STENT BRONCHUS;  Flexible Bronchoscopy, Bronchial Washings, Balloon Dilation;  Surgeon: Wilber Lin MD;  Location: [facility]     BRONCHOSCOPY, DILATE BRONCHUS, STENT BRONCHUS, COMBINED N/A 8/20/2018    Procedure: COMBINED BRONCHOSCOPY, DILATE BRONCHUS, STENT BRONCHUS;  Flexible Bronchoscopy, Balloon Dilation;  Surgeon: Wilber Lin,  MD;  Location: [facility]     BRONCHOSCOPY, DILATE BRONCHUS, STENT BRONCHUS, COMBINED N/A 10/29/2018    Procedure: Flexible Bronchoscopy, Balloon Dilation, Stent Revision, Airway Examination And Therapeutic Suctioning, Cyro Tumor Debulking;  Surgeon: Wilber Lin MD;  Location: [facility]     BRONCHOSCOPY, DILATE BRONCHUS, STENT BRONCHUS, COMBINED N/A 11/20/2018    Procedure: Rigid Bronchoscopy, Stent Removal and dilitation;  Surgeon: Wilber Lin MD;  Location: [facility]     BRONCHOSCOPY, DILATE BRONCHUS, STENT BRONCHUS, COMBINED N/A 12/14/2018    Procedure: Flexible And Rigid Bronchoscopy, Balloon Dilation, Bronchial Washing;  Surgeon: Wilber Lin MD;  Location: [facility]     BRONCHOSCOPY, DILATE BRONCHUS, STENT BRONCHUS, COMBINED N/A 1/17/2019    Procedure: Flexible And Rigid Bronchoscopy, Balloon Dilation.;  Surgeon: Wilber Lin MD;  [facility]: [facility]     BRONCHOSCOPY, DILATE BRONCHUS, STENT BRONCHUS, COMBINED N/A 3/7/2019    Procedure: Flexible and Rigid Bronchoscopy, Bronchial Washing, Balloon Dilation;  Surgeon: Wilber Lin MD;  Location: [facility]     BRONCHOSCOPY, DILATE BRONCHUS, STENT BRONCHUS, COMBINED N/A 6/6/2019    Procedure: Rigid and Flexible Bronchoscopy, Balloon Dilation;  Surgeon: Wilber Lin MD;  Location: [facility]     ENT SURGERY      tonsillectomy as a child     ESOPHAGOSCOPY, GASTROSCOPY, DUODENOSCOPY (EGD), COMBINED N/A 10/29/2018    Procedure: COMBINED ESOPHAGOSCOPY, GASTROSCOPY, DUODENOSCOPY (EGD) with biopsies and polypectomy;  Surgeon: Chente Bloom MD;  Location[facility]     INSERT EXTRACORPORAL MEMBRANE OXYGENATOR ADULT (OUTSIDE OR) N/A 2/27/2018    Procedure: INSERT EXTRACORPORAL MEMBRANE OXYGENATOR ADULT (OUTSIDE OR);  INSERT EXTRACORPORAL MEMBRANE OXYGENATOR ADULT (OUTSIDE OR) ;  Surgeon: Toby Hernandez MD;  Location: [facility]     no prior surgery       REMOVE EXTRACORPORAL MEMBRANE OXYGENATOR ADULT N/A 3/3/2018    Procedure:  REMOVE EXTRACORPORAL MEMBRANE OXYGENATOR ADULT;  Removal of Right Femoral Venous and Right Axillary Arterial Extracorporeal Membrane Oxygenator;  Surgeon: Toby Hernandez MD;  Location: [facility]     TRANSPLANT LUNG RECIPIENT SINGLE X2 Bilateral 3/1/2018    Procedure: TRANSPLANT LUNG RECIPIENT SINGLE X2;  Median Sternotomy, Extracorporeal Membrane Oxygenator, bilateral sequential lung transplant;  Surgeon: Toby Hernandez MD;  Location: [facility]            Social History:     Social History     Socioeconomic History     Marital status:      Spouse name: Not on file     Number of children: Not on file     Years of education: Not on file     Highest education level: Not on file   Occupational History     Not on file   Social Needs     Financial resource strain: Not on file     Food insecurity:     Worry: Not on file     Inability: Not on file     Transportation needs:     Medical: Not on file     Non-medical: Not on file   Tobacco Use     Smoking status: Never Smoker     Smokeless tobacco: Never Used   Substance and Sexual Activity     Alcohol use: No     Alcohol/week: 0.6 oz     Types: 1 Glasses of wine per week     Drug use: No     Sexual activity: Not on file   Lifestyle     Physical activity:     Days per week: Not on file     Minutes per session: Not on file     Stress: Not on file   Relationships     Social connections:     Talks on phone: Not on file     Gets together: Not on file     Attends [employer] service: Not on file     Active member of club or organization: Not on file     Attends meetings of clubs or organizations: Not on file     Relationship status: Not on file     Intimate partner violence:     Fear of current or ex partner: Not on file     Emotionally abused: Not on file     Physically abused: Not on file     Forced sexual activity: Not on file   Other Topics Concern     Parent/sibling w/ CABG, MI or angioplasty before 65F 55M? No   Social History Narrative    3/6/2019 - Lives  with . Has three daughters. Four grandchildren (two ). No pets. Travelled previously to [place], [place]. Has visited [place] several times.              Family History:     Family History   Problem Relation Age of Onset     Hypertension Mother      Arthritis Mother      Pancreatic Cancer Father      Diabetes Father              Immunizations:     Most Recent Immunizations   Administered Date(s) Administered     Influenza Vaccine IM > 6 months Valent IIV4 10/29/2018     Pneumo Conj 13-V (2010&after) 2016     Pneumococcal 23 valent 2014     TDAP Vaccine (Boostrix) 2018     Tdap (Adult) Unspecified Formulation 2008     Zoster vaccine recombinant adjuvanted (SHINGRIX) 2019             Allergies:   No Known Allergies          Medications:     Medications Prior to Admission   Medication Sig Dispense Refill Last Dose     acetaminophen (TYLENOL) 500 MG tablet Take 1,000 mg by mouth every 6 hours as needed for pain    Past Week at Unknown time     amLODIPine (NORVASC) 10 MG tablet Take 1 tablet (10 mg) by mouth daily 30 tablet 11 2019 at PM     calcium-vitamin D (CALTRATE) 600-400 MG-UNIT per tablet Take 1 tablet by mouth daily   2019 at 1500     dronabinol (MARINOL) 5 MG capsule Take 5 mg by mouth 2 times daily (before meals) as needed for nausea   Past Week at Unknown time     ferrous sulfate (FEROSUL) 325 (65 Fe) MG tablet Take 1 tablet (325 mg) by mouth daily (with breakfast) 60 tablet 2 2019 at AM     magnesium oxide (MAG-OX) 400 MG tablet Take 800 mg by mouth daily   2019 at PM     metoprolol tartrate (LOPRESSOR) 25 MG tablet Take 2 tablets (50 mg) by mouth 2 times daily 60 tablet 11 2019 at PM     multivitamin, therapeutic with minerals (THERA-VIT-M) TABS tablet Take 1 tablet by mouth daily 30 each 11 2019 at PM     ondansetron (ZOFRAN) 4 MG tablet Take 1 tablet (4 mg) by mouth every 12 hours as needed for nausea 60 tablet 0 Past Week at  Unknown time     pantoprazole (PROTONIX) 40 MG EC tablet Take 1 tablet (40 mg) by mouth 2 times daily 60 tablet 11 9/11/2019 at PM     predniSONE (DELTASONE) 2.5 MG tablet Take two tablets (5mg) every AM and one tablet (2.5mg) every evening 90 tablet 11 9/11/2019 at PM     sulfamethoxazole-trimethoprim (BACTRIM/SEPTRA) 400-80 MG tablet Take 1 tablet by mouth daily 90 tablet 3 9/11/2019 at AM     tacrolimus (GENERIC EQUIVALENT) 0.5 MG capsule Take 1 capsule (0.5 mg) by mouth every evening Total daily dose of 2mg in AM and 1.5mg in PM 90 capsule 3 9/11/2019 at PM     tacrolimus (GENERIC EQUIVALENT) 1 MG capsule Take two (1mg) caps ever AM and one (1mg) cap every PM. Total dose is 2mg in morning and 1.5mg every evening 270 capsule 3 9/11/2019 at PM     order for DME Equipment being ordered: Nebulizer 1 Device 1 Taking     order for DME Equipment being ordered: Nebulizer 1 Device 1 Taking       Current Facility-Administered Medications   Medication     acetaminophen (TYLENOL) tablet 975 mg     amLODIPine (NORVASC) tablet 10 mg     calcium carbonate 600 mg-vitamin D 400 units (CALTRATE) per tablet 1 tablet     docusate sodium (COLACE) capsule 100 mg     ferrous sulfate (FEROSUL) tablet 325 mg     lidocaine (LMX4) cream     lidocaine 1 % 0.1-1 mL     magnesium hydroxide (MILK OF MAGNESIA) suspension 30 mL     magnesium oxide (MAG-OX) tablet 400 mg     melatonin tablet 1 mg     metoprolol tartrate (LOPRESSOR) tablet 50 mg     multivitamin w/minerals (THERA-VIT-M) tablet 1 tablet     naloxone (NARCAN) injection 0.1-0.4 mg     ondansetron (ZOFRAN-ODT) ODT tab 4 mg    Or     ondansetron (ZOFRAN) injection 4 mg     oxyCODONE (ROXICODONE) tablet 5 mg     pantoprazole (PROTONIX) EC tablet 40 mg     piperacillin-tazobactam (ZOSYN) 2.25 g vial to attach to  ml bag     polyethylene glycol (MIRALAX/GLYCOLAX) Packet 17 g     polyethylene glycol (MIRALAX/GLYCOLAX) Packet 17 g     predniSONE (DELTASONE) tablet 2.5 mg      "predniSONE (DELTASONE) tablet 5 mg     prochlorperazine (COMPAZINE) injection 10 mg    Or     prochlorperazine (COMPAZINE) tablet 10 mg    Or     prochlorperazine (COMPAZINE) Suppository 25 mg     senna-docusate (SENOKOT-S/PERICOLACE) 8.6-50 MG per tablet 2 tablet     sodium chloride (PF) 0.9% PF flush 3 mL     sodium chloride (PF) 0.9% PF flush 3 mL     sulfamethoxazole-trimethoprim (BACTRIM/SEPTRA) 400-80 MG per tablet 1 tablet     tacrolimus (GENERIC EQUIVALENT) capsule 1.5 mg     tacrolimus (GENERIC EQUIVALENT) capsule 2 mg            Review of Systems:   Complete 12 point ROS completed and negative unless mentioned in HPI.          Physical Exam:   BP (!) 141/81 (BP Location: Right arm)   Pulse 88   Temp 98.9  F (37.2  C) (Oral)   Resp 18   Ht 1.626 m (5' 4\")   Wt 55.6 kg (122 lb 9.6 oz)   LMP 06/07/2014 (Exact Date)   SpO2 94%   BMI 21.04 kg/m      General: NAD  HEENT: NC/AT. EOMi. PERRL. White sclera. No mucosal lesions  Lymph: No cervical lymphadenopathy  CV: RRR, no m/r/g  Lung: CTAB, reduced breath sounds in the left lower lobe  Abdomen: Soft, non-tender, non-distended  Neuro: Alert and oriented without focal deficits.  Skin/Hair: No dermatomyositis rashes, no  hands  Ext: WWP. No edema  Msk: Hand joints examined without any active synovitis or joint tenderness. Mild sclerodactyly. Full ROM of the hands. Knees with bilateral fullness R>L. No erythema. + warmth. No tenderness on palpation of the bilateral knees. Limited flexion of the knees.          Data:   Labs and imaging reviewed.     Chano Smith MD  Internal Medicine PGY3       Patient Demographics:  58-year-old female.        Patient History:  Chronic pain of both knees.  Synovial hypertrophy, possible intra-articular   malignancy.  Pain and swelling, significant synovitis, calcifications, PVNS.  Assess for soft   tissue mass in suprapatellar space ongoing for months, pain scale 8/10.  Patient Hx: Left knee   pain and swelling " right above knee, several months.       Relevant Surgery:  None.        TECHNICAL INFORMATION           Protocol:  MRI of the left knee was performed using a combination of T1-weighted, Proton   Density, T2-weighted and/or STIR images.  Post-contrast fat-suppressed T1-weighted images were   subsequently performed.       IV Contrast:  10 mL Dotarem.       Sedation/Analgesia:  None.       COMPARISON:  Radiographs of the left knee dated 6/21/2018.     _____________________________________________________________     CONCLUSION      1.  There is marked distention of the joint capsule as well as the   semimembranosus/gastrocnemius bursa with a heterogeneous soft tissue process as discussed   below.  There is irregular enhancement of the peripheral joint capsule although the majority of   the contents of the capsule do not enhance.  In addition, many of the contents have a rounded   or ovoid appearance and hence the differential would include synovial chondromatosis.  Note   that no osseous bodies are seen on the corresponding comparison radiographs, although   comparison to more recent radiographs is recommended.  The differential would also include   pigmented villonodular synovitis as multiple sequences demonstrate a rind of lower signal   intensity along the periphery of the capsule, suggesting hemosiderin deposition.  However, no   diagnostic gradient echo sequences were performed to evaluate for blooming artifact.  The   patient may return at no additional charge for additional sequences to include axial, sagittal   and gradient echo images if clinically useful.     2.  Note is made that this process was evident on the previous plain film radiographs from June 2018.  The lack of significant extension beyond the capsule may favor a more benign process   such as synovial chondromatosis or PVNS, although other malignancies could not be completely   excluded.     3.  Tearing of the medial and lateral menisci.      4.  Tricompartment osteoarthritic changes, greatest involving the medial compartment.     5.  No significant ligament or tendon abnormalities.     _____________________________________________________________     INTERPRETATION     Osseous Structures      Fractures/Contusions:  No definite acute or chronic fractures are visualized.     Masses:  No bony masses are identified.     Joint Capsule      Effusion:  No significant free fluid is seen within the joint capsule.       Synovitis:  There is marked distention of the joint capsule by soft tissue, demonstrating   intermediate to lower signal intensity soft tissue on proton density images.  Much of the   intra-articular soft tissue demonstrates a nodular appearance.  On fat-suppressed T2-weighted   images, there is a background of edematous soft tissue surrounding many of these individual   nodular masses.  Postcontrast images demonstrate diffuse enhancement of the periphery of the   joint capsule which is fairly confluent along the medial and lateral compartment joint lines.    However, there is nonenhancement of the soft tissue components centrally within the   suprapatellar recess.  This process appears to remain confined to the joint capsule without   extension beyond the capsule into the surrounding soft tissues.  The notable exception is   extension of this process into the semimembranosus/gastrocnemius bursa.     Intra-articular Bodies:  As noted above, many of the soft tissue elements distending the joint   capsule have a rounded appearance and may represent chondral bodies.     Cruciate Ligaments      Anterior Cruciate Ligament:  Normal.     Posterior Cruciate Ligament:  Normal.     Extensor Tendon Complex      Quadriceps Tendon:  Normal.     Patellar Tendon:  Normal.     Posteromedial Corner Complex      Medial Collateral Ligament:  Normal.     Semimembranosus Tendon:  Normal.     Posterolateral Corner Complex      Fibular Collateral Ligament:  Normal.      Biceps Femoris Tendon Complex:  Normal.     Popliteus Tendon:  Normal.     Iliotibial Band:  Normal.     Medial Compartment      Medial Meniscus:  Residua of broad-based tearing and attenuation is seen of the entire middle   and posterior third segments of the medial meniscus.  The anterior horn remains intact.     Articular Cartilage:  There is a broad-based grade II chondromalacia of the medial femoral   condyle articular surfaces with reciprocal broad-based grade III/IV chondromalacia of the   medial tibial plateau.     Lateral Compartment      Lateral Meniscus:  There is short segment tearing and attenuation of the far posterior horn of   the lateral meniscus coursing into its tibial attachment site.  There is additional cleavage   tearing involving the central aspect and anterior body of the middle one-third segment of the   lateral meniscus.  There is truncation of the inner margin of the anterior horn which may   reflect additional tearing.     Articular Cartilage:  There is diffuse grade II chondromalacia of the weightbearing and   nonweightbearing articular surfaces of the lateral compartment which may include areas of grade   III chondromalacia of the nonweightbearing portions of the lateral tibial plateau.     Patellofemoral Compartment      Articular Cartilage:  There is broad-based grade II/III chondral loss of the patellofemoral   joint space.     Patellar Alignment:  Normal.     Medial Patellar Retinaculum/MPFL:  Normal.     Fat Pads:  There is nonspecific edema within the infrapatellar fat pad.     Periarticular Soft Tissues      Popliteal Cyst:  As noted above, there is distention of the semimembranosus/gastrocnemius bursa   with the soft tissue process distending the joint capsule.     Periarticular Cysts:  None.     Musculature:  There is generalized increased signal intensity present within the   posterior/proximal calf musculature.     Subcutaneous Tissues:  Unremarkable.     Neurovascular  Structures:  Normal.     KMK:tb      Read By: Lidia Juarez M.D.     Reviewed and Electronically Signed By: Lidia Juarez M.D.

## 2019-09-13 NOTE — PROGRESS NOTES
General Surgery Progress Note  Subjective:  No acute distress overnight. Patient still complaining of LUQ pain. Denies having RUQ pain. Imaging found to have cholecystitis and empyema of Left Lung.     Objective:  Temp:  [97.1  F (36.2  C)-98.9  F (37.2  C)] 97.1  F (36.2  C)  Pulse:  [82-92] 92  Resp:  [14-18] 16  BP: (127-141)/(71-85) 141/71  SpO2:  [94 %-100 %] 98 %  I/O last 3 completed shifts:  In: 1280 [P.O.:930; I.V.:100; IV Piggyback:250]  Out: -     Physical Exam  General: No acute distress, resting comfortably this AM  CV: RRR on tele  Resp: Non-labored breathing, no respiratory distress  GI: Soft, non-distended, tender over LUQ/epigastrium  Ext: wwp, non-edematous  Neuro: No focal neuro deficits    A/P: Kecia Blue is a 58-year-old female with a past medical history of dermatomyositis, RA, ILD  s/p bilateral lung transplant on 3/1/18, who presents with left-sided intermittent abdominal pain that started 2 months ago. History and exam not consistent with cholecystitis or biliary colic, however, US findings demonstrated a thickened gallbladder wall with a stone and extrahepatic bile duct dilation to 11mm. CT chest showing 4.2x2.5cm empyema in the medial L costophrenic angle, likely the cause of her LUQ pain.     - Non-operative management at this time  - Continue management of empyema per pulmonary and primary team  - Rec continuing antibiotics  - Continue to trend LFTs, notify surgery team if she continues to have worsening pain.   - Will not pursue cholecystectomy at this time. Please call with questions.       Patient seen and evaluated with Dr. Vick who discussed plan with staff.    Moise Stokes MD  General Surgery Resident

## 2019-09-13 NOTE — PLAN OF CARE
D: Patient admitted 9/11 for abdominal pain/cholecystitis. Found to have left lower lobe empyema via chest CT. Hx: lung transplant (3/2018) for ILD, anti-synthetase syndrome, dermatomyositis, pulm htn, chronic cought and seronegative RA.     I: Monitored vitals and assessed patient status. Patient brought down for left thoracentesis, unable to pull fluid. IV antibiotics given as scheduled.   PRN: oxycodone    A: VSS. A0x4. Afebrile. Urinating adequately. + BM yesterday. Up ad diego.     P: Continue to assess and monitor, notify [facility] team with concerns.

## 2019-09-13 NOTE — PROGRESS NOTES
Pulmonary Medicine  Cystic Fibrosis - Lung Transplant Team  Progress Note  2019       Patient: Kecia Blue  MRN: [id number]  : 1960 (age 58 year old)  Transplant: 3/1/2018 (Lung), POD#561)  Admission date: 2019    Assessment & Plan:     Kecia Blue is a 58 year old female who is S/P bilateral lung transplant 3/1/18 for ILD with Pulm HTN. Post operative course complicated by right main bronchial stenosis, positive DSAs, CMV viremia, leukopenia, C difficile and CMV viremia. Other history includes chronic cough, dermatomyositis, and seronegative RA. The patient was admitted on 2019 from OSH for surgical evaluation of abdominal pain, cholelithiasis, colicystitis, and CBD dilation. We are consulted for immunosuppression management.    Recommendations:   -Will discuss with CVTS about next approach.   - Tac level on 19.   - If fevers - please obtain blood cultures and fungal cultures.   - Start IV anbx - IV vanc, Zosyn and IV Micafungin if pt has signs of sepsis.    S/P bilateral lung transplant for ILD:  Last seen in pulm clinic  by Dr. Mcelroy. Baseline mildly productive cough (clear sputum). Most recent PFTs with FEV1 57% (). Upon admission patient denies dyspnea, no hypoxia, but baseline cough is with increased sputum production that has changed to yellow in color. No fever, aches chills, or leukocytosis. Exam is clear.  - CXR ordered to evaluate for infection vs pulmonary edema.  - Sputum culture and sputum fungal culture ordered.  - Blood cultures () pending.     Immunosuppression:  - Tacrolimus 2 mg qAM/ 1.5 mg qPM.  Goal level 8-10 (d/t CKD), level was 9.5 on 19.  Next tacro level tomorrow  (needs to be ordered).  - MMF on HOLD since 18 d/t CMV viremia and leukopenia. Per Dr. Mcelroy, plan is to restart when CMV remains negative.  - Prednisone 5 mg qAM/ 2.5 qPM    Prophylaxis:   - Bactrim daily     Left UQ pain: W/u including MRI/MRCP showed  left pleural space fluid collection with possible Osteo. Suspect that the cholelithiasis and choledocholithiasis was a red herring. We attempted to place pigtail in left pleural space. IR was unable to get any fluid from that space or place pigtail. Hence will discuss with CVTS team about the next approach. ID consulted, appreciate input. We will continue to hold off anbx.   - ESR/CRP are significantly elevated.   - Fungitell pending.    Right main bronchial stenosis s/p dilatation: Follow with Dr. Lin for serial bronchoscopies with dilation. Last bronch 6/6 with dilation to BI.    - Next bronchoscopy is scheduled for 10/11 with Dr. Lin     H/o CMV Viremia: Valcyte discontinued 7/25. CMV PCR has been <100 since 2/21/19, but now again elevated to 662 (8/20), most recently 356 (8/26) CMV has been followed Q2 week as OP.   - CMV DNA on 9/12 was <137.     H/o Leukopenia: Improved since 8/7, suspect d/t discontinuation of Valcyte (as above). WBC 6.1 (9/12)  - CBC daily    Álvaro knee pain: ?related to h/o DM. She was on MMF prior to lung tx which controlled her sx. But now is off MMF due to leucopenia/recurrent CMV. Will consult rheumatology to see if there are other conservative options to treat this. I'm still hesitant to start MMF, but willing try at 250mg/day.   She has been seen by Ortho, and has failed steroid injection. She was advised that the next step with knee arthroplasty.    Cholelithiasis: At this time there is no urgent clinical indication for treatment. Will monitor for now.     We appreciate the excellent care provided by the [facility] team. Recommendations communicated via in person rounding and this note. Will continue to follow along closely, please do not hesitate to call with any questions or concerns.      Tarikaleja Christensen MD on 9/13/2019 at 3:01 PM     Subjective & Interval History:      Overall she is doing well with regards to pulm sx. No cough/SOB. No CP. She continues to have left UQ pain  "which has been going on for the last month.    Review of Systems:     C: no fever, no chills, no change in weight, no change in appetite  INTEGUMENTARY/SKIN: no rash or obvious new lesions  ENT/MOUTH: no sore throat, no sinus pain, no nasal congestion or drainage  RESP: see interval history  CV: no chest pain, no palpitations, no peripheral edema, no orthopnea  GI: no nausea, no vomiting, no change in stools, no reflux symptoms  : no dysuria  MUSCULOSKELETAL: no myalgias. Both knees are aching. Rt. worse than left. Rt. knee swollen.  ENDOCRINE: blood sugars with adequate control  NEURO: no headache, no numbness or tingling  PSYCHIATRIC: mood stable    Physical Exam:     Vital signs:  Temp: 98.9  F (37.2  C) Temp src: Oral BP: 128/72 Pulse: 84   Resp: 14 SpO2: 100 % O2 Device: None (Room air)   Height: 162.6 cm (5' 4\") Weight: 55.6 kg (122 lb 9.6 oz)  I/O:     Intake/Output Summary (Last 24 hours) at 9/13/2019 1501  Last data filed at 9/13/2019 0802  Gross per 24 hour   Intake 1160 ml   Output --   Net 1160 ml     Constitutional: in no apparent distress.   HEENT: Eyes with pink conjunctivae, anicteric. Oral mucosa moist without lesions. Neck supple without lymphadenopathy.   PULM: Good air flow bilaterally. No crackles, no rhonchi, no wheezes. Non-labored breathing on RA.  CV: Normal S1 and S2. RRR. No murmur, gallop, or rub. No peripheral edema.   ABD: NABS, soft, nontender, nondistended.    MSK: Moves all extremities. No apparent muscle wasting.   NEURO: Alert and oriented, conversant.   SKIN: Warm, dry. No rash on limited exam.   PSYCH: Mood stable.    Lines, Drains, and Devices:  Peripheral IV 09/11/19 Right Upper forearm (Active)   Site Assessment WDL 9/12/2019  8:00 PM   Line Status Infusing 9/12/2019  8:00 PM   Phlebitis Scale 0-->no symptoms 9/12/2019  8:00 PM   Infiltration Scale 0 9/12/2019 10:00 AM   Infiltration Site Treatment Method  None 9/12/2019 10:00 AM   Extravasation? No 9/12/2019 10:00 AM "   Number of days: 2     Data:     LABS    CMP:   Recent Labs   Lab 09/13/19  0536 09/11/19  2325    133   POTASSIUM 4.5 4.2   CHLORIDE 105 100   CO2 22 22   ANIONGAP 8 11   * 76   BUN 31* 24   CR 1.51* 1.28*   GFRESTIMATED 38* 46*   GFRESTBLACK 44* 54*   CHELSEY 8.5 9.2   PROTTOTAL 7.0 8.3   ALBUMIN 2.3* 2.8*   BILITOTAL 0.3 0.4   ALKPHOS 212* 255*   AST 21 29   ALT 14 17     CBC:   Recent Labs   Lab 09/13/19  0536 09/11/19  2325   WBC 5.0 6.1   RBC 2.91* 3.28*   HGB 8.0* 9.1*   HCT 26.8* 30.8*   MCV 92 94   MCH 27.5 27.7   MCHC 29.9* 29.5*   RDW 15.6* 15.5*    189       INR:   Recent Labs   Lab 09/11/19  2325   INR 1.06       Glucose:   Recent Labs   Lab 09/13/19  0536 09/11/19  2325   * 76       Blood Gas: No lab results found in last 7 days.    Culture Data   Recent Labs   Lab 09/12/19  1220 09/12/19  0002 09/11/19  2325   CULT Moderate growth  Normal alo to date    Culture in progress  Culture negative after 20 hours No growth after 1 day No growth after 1 day       Virology Data:   Lab Results   Component Value Date    FLUAH1 Negative 03/07/2019    FLUAH3 Negative 03/07/2019    UV0515 Negative 03/07/2019    IFLUB Negative 03/07/2019    RSVA Negative 03/07/2019    RSVB Negative 03/07/2019    PIV1 Negative 03/07/2019    PIV2 Negative 03/07/2019    PIV3 Negative 03/07/2019    HMPV Negative 03/07/2019    HRVS Positive (A) 03/07/2019    ADVBE Negative 03/07/2019    ADVC Negative 03/07/2019    ADVC Negative 01/17/2019    ADVC Negative 12/14/2018       Historical CMV results (last 3 of prior testing):  Lab Results   Component Value Date    CMVQNT <137 (A) 09/12/2019    CMVQNT <137 (A) 06/05/2019    CMVQNT 494 (A) 03/07/2019     Lab Results   Component Value Date    CMVLOG <2.1 09/12/2019    CMVLOG <2.1 06/05/2019    CMVLOG 2.7 (H) 03/07/2019       Urine Studies    Recent Labs   Lab Test 09/12/19  0125 08/07/19  1512   URINEPH 7.5* 7.0   NITRITE Negative Negative   LEUKEST Negative Trace*    WBCU 3 19*       Most Recent [place] Pulmonary Function Testing (FVC/FEV1 only)  FVC-Pre   Date Value Ref Range Status   08/07/2019 2.22 L    06/05/2019 2.26 L    03/05/2019 1.97 L    01/16/2019 1.92 L      FVC-%Pred-Pre   Date Value Ref Range Status   08/07/2019 67 %    06/05/2019 70 %    03/05/2019 60 %    01/16/2019 59 %      FEV1-Pre   Date Value Ref Range Status   08/07/2019 1.60 L    06/05/2019 1.65 L    03/05/2019 1.31 L    01/16/2019 1.04 L      FEV1-%Pred-Pre   Date Value Ref Range Status   08/07/2019 61 %    06/05/2019 64 %    03/05/2019 51 %    01/16/2019 40 %        IMAGING    Recent Results (from the past 48 hour(s))   US Abdomen Limited (RUQ)    Narrative    EXAMINATION: Limited Abdominal Ultrasound, 9/11/2019 11:50 PM     COMPARISON: CT abdomen 8/20/2018    HISTORY: Transfer from outside hospital for management of possible  acute cholecystitis    FINDINGS:   Fluid: No evidence of ascites or pleural effusions.    Liver: The liver demonstrates normal echotexture, measuring 18.9 cm in  craniocaudal dimension. There is no focal mass.     Gallbladder: The gallbladder wall is thickened to 4 mm, however is not  significantly hyperemic on color Doppler images. Trace pericholecystic  edema Biliary sludge and cholelithiasis, including stone at the  gallbladder neck.    Bile ducts: The proximal extra hepatic bile duct is dilated to 11 mm,  increased from 8/20/2018. No intrahepatic biliary ductal dilatation.    Pancreas: Obscured by shadowing bowel gas.    Kidney: The right kidney measures 9.0 cm long. There is no  hydronephrosis or hydroureter, no shadowing renal calculi, cystic  lesion or mass.       Impression    IMPRESSION:   1.  In the proper clinical setting, findings concerning for acute  cholecystitis, including gallbladder wall thickening, pericholecystic  edema, and multiple stones at the gallbladder neck.  2.  Distention of the extrahepatic bile ducts to 11 mm, increased from  8/20/2018, possibly from  distal choledocholithiasis (the distal ducts  are obscured) or recently passed stone.     I have personally reviewed the examination and initial interpretation  and I agree with the findings.    YONATAN HERNANDES MD   XR Abdomen Port 1 View    Narrative    Examination:  XR ABDOMEN PORT 1 VW    Date:  9/12/2019 11:04 AM     Clinical Information: 57 y/o female with no BM for 2 weeks - evaluate  stool burden     Findings:     Single supine radiograph of the abdomen. Dilation of small and large  bowel with normal caliber ascending and descending colon. Moderate  stool burden. The bowel within the pelvis.Phleboliths in the pelvis.  No acute osseous abnormalities.       Impression    Impression:  1. Dilation of small and large bowel. Suggestive of adynamic ileus.  Nonspecific bowel gas pattern, if further clinical concern consider  CT.  2. Moderate stool burden.     I have personally reviewed the examination and initial interpretation  and I agree with the findings.    MEHNAZ CHAPMAN MD   XR Chest 2 Views    Narrative    XR CHEST 2 VW  9/12/2019 12:42 PM      HISTORY: S/P lung transplant    COMPARISON: 8/7/2019, 6/5/2019    FINDINGS: Single view of the chest. Status post bilateral lung  transplants. Trachea is midline, heart size is stable. Slightly  increased mild perihilar interstitial streaky opacities. No pleural  effusion or pneumothorax. No acute osseous or upper abdominal  abnormality.      Impression    IMPRESSION:  1. Status post bilateral lung transplant.  2. Cardiomegaly with slight increase of bilateral perihilar streaky  opacities, favor mild pulmonary edema.    I have personally reviewed the examination and initial interpretation  and I agree with the findings.    ADAM GONZALEZ MD   MR Abdomen MRCP w/o & w Contrast   Result Value    Radiologist flags Possible empyema versus abscess at the left (Urgent)    Narrative    MRI ABDOMEN    CLINICAL HISTORY:  Dilated common bile duct on ultrasound  exam.    TECHNIQUE:  Images were acquired with and without intravenous contrast  through the abdomen. The following MR images were acquired: TrueFISP,  multiplanar T2 weighted, axial T1 in/out of phase, axial fat-saturated  T1, diffusion-weighted. Multiplanar T1-weighted images with fat  saturation were obtained before contrast administration and at  multiple time points following the administration of intravenous  contrast. Contrast dose: 7.5ml of GADAVIST    FINDINGS:    Comparison study: Ultrasound abdomen 9/11/2019.    Liver: Normal T1 and T2 signal in the liver. Small cyst in the  posterior hepatic dome. No evidence of signal dropout on out of phase  imaging.    Gallbladder/biliary tree: The gallbladder is dilated to 4.1 cm in  transverse diameter with surrounding pericholecystic fluid. Multiple  dependent stones in the gallbladder. The common bile duct is dilated  at 12 mm.    Spleen: The spleen is the upper limits of normal at 12.8 cm in  craniocaudal dimension.    Kidneys: Bilateral simple renal cysts. No hydronephrosis.    Adrenal glands: Unremarkable    Pancreas: Normal appearance of the pancreas.    Bowel: Normal diameter small bowel and colon. No diverticulitis.    Lymph nodes: No lymphadenopathy by size criteria.    Blood vessels: Patent major abdominal vasculature.    Lung bases: Rim-enhancing fluid-filled structure in the posterior left  hemithorax measuring 4.5 x 3.0 cm (series 22 image 14 and series 9  image 27). Small bilateral pleural effusions.    Bones and soft tissues: Normal bone marrow signal.    Ascites: Small amount of ascites.      Impression    IMPRESSION:   1. Rim-enhancing fluid-filled structure in the posterior left  hemithorax measuring 4.5 x 3.0 cm (series 22 image 14 and series 9  image 27). Findings are concerning for abscess versus empyema.  Follow-up is recommended using chest CT until resolution.  2. Distended gallbladder with pericholecystic fluid, gallstones, and  dilated  common bile duct at 12 mm. No choledocholithiasis or  obstructing mass.    [Urgent Result: Possible empyema versus abscess at the left  costophrenic angle]    Finding was identified on 9/12/2019 5:33 PM.     Thi was contacted by Dr. James at 9/12/2019 5:48 PM and verbalized  understanding of the urgent finding.    I have personally reviewed the examination and initial interpretation  and I agree with the findings.    DEAN PARKER MD   CT Chest w Contrast    Narrative    EXAMINATION: Chest CT  9/12/2019 6:51 PM    CLINICAL HISTORY: 67-year-old post lung transplant, fluid collection  on MRCP    COMPARISON: MRCP today.    TECHNIQUE: CT imaging obtained through the chest with contrast.  Coronal and axial MIP reformatted images obtained.     FINDINGS:  Postsurgical changes of bilateral lung consolidation. Normal-sized  heart and great vessels. No mediastinal lymphadenopathy.    There is a 4.2 x 2.5 cm rim enhancing fluid collection in the in the  medial left costophrenic angle (series 2 image 40 and sagittal image  46). The collection surrounds the anterior surface of the the left  10th rib (series 7 image 46) with no fat plane between, with possible  erosion into the bone. Small bilateral pleural effusions.    No suspicious pulmonary nodule. Atelectasis in the posterior lower  lobes.    Bones and soft tissues: Mild periosteal reaction and possible erosions  in the posterior left 10th rib seen best on the sagittal views.    Partially imaged upper abdomen: Cholelithiasis and dilated common bile  duct and gallbladder.      Impression    IMPRESSION:  There is a 4.2 x 2.5 cm empyema in the medial left costophrenic angle,  possibly complicated by left 10th rib osteomyelitis.    I have personally reviewed the examination and initial interpretation  and I agree with the findings.    DEAN PARKER MD   IR Thoracentesis    Narrative    PRE-PROCEDURE DIAGNOSIS: Left pleural effusion    PROCEDURE: IR THORACENTESIS     Impression    IMPRESSION: Failed thoracentesis, unable to obtain fluid for  diagnostic testing using two separate punctures under ultrasound  guidance.     PLAN: Follow-up per primary medicine an infectious disease team's.    ----------    CLINICAL HISTORY: Patient with interstitial lung disease status post  bilateral lung transplant March 20, 2018 admitted with left  abdominal/chest pain found have left pleural effusion concerning for  empyema and possibly adjacent left rib osteomyelitis. IR has been  consulted for diagnostic left thoracentesis and possible chest tube  placement.     COMPARISON: CT chest and MR abdomen 9/12/2019    PERFORMED BY: Kenneth Sparrow PA-C and Case Amaya MD    CONSENT: Written informed consent was obtained and is documented in  the patient record.    MEDICATIONS: 1% lidocaine for local anesthesia      NURSING: Patient continuously monitored by trained, independent  observer.     DESCRIPTION: Targeted ultrasound shows small complex effusion on the  left consistent with recent CT. Patient positioned upright and the  skin overlying the left pleural effusion was prepped and draped in the  usual sterile fashion.    Under continuous ultrasound visualization, the skin and pleura was  anesthetized before a centesis needle/catheter system was advanced  into the effusion, however no fluid was able to be aspirated. A wire  would not track either so everything was removed. Further ultrasound 1  rib space superior showed effusion and a second centesis needle was  advanced into this collection, however no fluid was aspirated.  Occlusive dressings applied. Primary medicine team updated.     COMPLICATIONS: No immediate concerns, the patient remained stable  throughout the procedure and tolerated it well.    ESTIMATED BLOOD LOSS: Minimal    SPECIMENS: None    URMILA SPARROW PA-C

## 2019-09-13 NOTE — PROGRESS NOTES
"                              Internal Medicine Progress Note - [facility] Service  Kecia Blue MRN: [id number]  Age: 58 year old, : 1960  Date: 2019         Interval History:     Had large loose stools after enema , feels much improved afterwards. Left sided pain is better but still present.   No acute events otherwise.   Per pulm team knee pain.   Last 24 hr care team notes reviewed.     ROS:  4 point ROS including Respiratory, CV, GI and , is negative .     PHYSICAL EXAM    Vital signs:  Temp: 98.9  F (37.2  C) Temp src: Oral BP: 138/78 Pulse: 88   Resp: 18 SpO2: 96 % O2 Device: None (Room air)   Height: 162.6 cm (5' 4\") Weight: 55.6 kg (122 lb 9.6 oz)  Estimated body mass index is 21.04 kg/m  as calculated from the following:    Height as of this encounter: 1.626 m (5' 4\").    Weight as of this encounter: 55.6 kg (122 lb 9.6 oz).      Intake/Output Summary (Last 24 hours) at 2019 0823  Last data filed at 2019 0300  Gross per 24 hour   Intake 1160 ml   Output --   Net 1160 ml     GEN: NAD, resting comfortably on exam, AAox3  HEENT: NC/AT , well injected conjunctiva, EOMI, PERRL, MMM  Neck: trachea midline, no JVD  CV: RRR, nl S1/S2 no mumurs  PULM: decrease air entry at the left base   Abdomen: soft, non tender, no HSM, no massess,  BS +   EXTREMITIES: warm, trace pedal edema,   SKIN: No rashes,   NEURO: MS: AAOx3 - no focal deficit     DIAGNOSTIC STUDIES  ROUTINE ICU LABS (Last four results)  CMP  Recent Labs   Lab 19  0536 19  2325    133   POTASSIUM 4.5 4.2   CHLORIDE 105 100   CO2 22 22   ANIONGAP 8 11   * 76   BUN 31* 24   CR 1.51* 1.28*   GFRESTIMATED 38* 46*   GFRESTBLACK 44* 54*   CHELSEY 8.5 9.2   PROTTOTAL 7.0 8.3   ALBUMIN 2.3* 2.8*   BILITOTAL 0.3 0.4   ALKPHOS 212* 255*   AST 21 29   ALT 14 17     CBC  Recent Labs   Lab 19  0536 19  2325   WBC 5.0 6.1   RBC 2.91* 3.28*   HGB 8.0* 9.1*   HCT 26.8* 30.8*   MCV 92 94   MCH 27.5 27.7 "   MCHC 29.9* 29.5*   RDW 15.6* 15.5*    189     INR  Recent Labs   Lab 09/11/19  2325   INR 1.06     Arterial Blood GasNo lab results found in last 7 days.    MICROBIOLOGY  Sputum 9/12 NGTD  Blood culture x2 9/11 NGTD    IMAGING  CT chest 9/13:  There is a 4.2 x 2.5 cm empyema in the medial left costophrenic angle,  possibly complicated by left 10th rib osteomyelitis.    I reviewed all medications, laboratory results, and imaging over the past 24 hours. Notable for    Assessment and Plan:     Kecia Blue is a 58 year old female admitted on 9/11/2019. She has a history of bilateral lung transplant (3/2018) for ILD, anti-synthetase syndrome, dermatomyositis, pulmonary hypertension, chronic cough, seronegative RA, and is admitted for cholecystitis with possible choledocholithiasis and surgical evaluation.    #Plan for today  - IR consult for chest tube and thoracentesis  - Transplant ID consult    - follow up Tacrolimus level  - Rheumatology consult     #LUQ pain  #Left lower lobe empyema with possible adjacent osteomyelitis   Patient presented with LUQ. Initial imaging suggestive of biliary disease however patient presentation was not consistent with the prior. On an MRCP she was incidintaly found to have left empyema which was confirmed by chest CT.     -Plan for IR thoracentesis and Chest tube placement if possible   -Cultures pending   - ID consult to assist with duration and regimen  - Continue Zosyn for now       #Imaging findings concerning for cholecystitis and choledocholithiasis   On admission patient had US on admission c/w cholecystitis with possible choledocholithiasis given mild dilation of bile duct to 11mm. MRCP with no choledocholithiasis however confirmed the US findings c/w possible cholecystitis.  This is likely an incidental finding and not the cause of her current LUQ pain. GI with no plan for intervention at this point unless clinically change. Surgery onboard , will address empyema  as above and re-evaluate need for Lap-gisella early next week    - Gen surg consult in place appreciate input   - GI consulted appreciate input. No plan for ERCP unless she was going to the OR then intraoperative ERCP   - Zofran and compazine PRN  - Pain control: tylenol PRN, oxycodone 5mg PRN (decrase frequency , discontinue in the AM)    #Right knee swelling / pain   #Hisory of Dermatomyositis / seronegative RA  Currently on therapy for RA other than predinsone. Concern for relapse  - Rheumatology consult   - Prednisone as above     #S/P Hx of bilateral lung transplant 3/2018   - No active issues  *IS:  Tacrolimus 2mg/1.5mg (goal 8-10 , levels pending)  Predinsone 5/2.5  MMF on hold 2/2 CMV viremia   *PPX: Bactrim Qday     #Hx of CMV viremia   Off VGCV since 7/25/2019. Most recent level 356 8/26   - CMV levels pending     #Ileus/constipation, resolved  S/p pink lady enema   - senna-sarah BID   Chronic Medical Problems  HTN: cont pta amlodipine and metoprolol  ?CKD: admission Cr similar to baseline, likely 2/2 tacrolimus  transplant     Consulting Services:   Pulm  ID  GI  Gen Surg    Diet: Regular   GI PPx: none   VTE prophylaxis: Ambulate   Disposition: likely next week  Family: bedside    PT/OT: N/N  Code status: Full  Lines:  PIVs    Patient care plan discussed beside with patient, RN and patient's.     Darion Rodríguez MD  Internal Medicine Hospitalist & Staff Physician  [facility]  Pager: [phone]    Team: Denis Warren   Page Cross Cover between 5pm-8am: pager [phone] ([place]), if no response then page job code [phone].

## 2019-09-13 NOTE — PRE-PROCEDURE
GENERAL PRE-PROCEDURE:   Procedure:  Left diagnostic thoracentesis vs chest tube  Date/Time:  9/13/2019 1:54 PM    Verbal consent obtained?: Yes    Written consent obtained?: Yes    Risks and benefits: Risks, benefits and alternatives were discussed    Consent given by:  Patient  Patient states understanding of procedure being performed: Yes    Patient's understanding of procedure matches consent: Yes    Procedure consent matches procedure scheduled: Yes    : local only.  ASA Class:  Class 2- mild systemic disease, no acute problems, no functional limitations  History & Physical reviewed:  History and physical reviewed and no updates needed  Statement of review:  I have reviewed the lab findings, diagnostic data, medications, and the plan for sedation

## 2019-09-14 LAB
ALBUMIN SERPL-MCNC: 2.2 G/DL (ref 3.4–5)
ALP SERPL-CCNC: 220 U/L (ref 40–150)
ALT SERPL W P-5'-P-CCNC: 14 U/L (ref 0–50)
ANION GAP SERPL CALCULATED.3IONS-SCNC: 8 MMOL/L (ref 3–14)
AST SERPL W P-5'-P-CCNC: 20 U/L (ref 0–45)
BILIRUB SERPL-MCNC: 0.2 MG/DL (ref 0.2–1.3)
BUN SERPL-MCNC: 32 MG/DL (ref 7–30)
CALCIUM SERPL-MCNC: 8.5 MG/DL (ref 8.5–10.1)
CHLORIDE SERPL-SCNC: 103 MMOL/L (ref 94–109)
CO2 SERPL-SCNC: 22 MMOL/L (ref 20–32)
CREAT SERPL-MCNC: 1.79 MG/DL (ref 0.52–1.04)
CRYPTOC AG SPEC QL: NORMAL
ERYTHROCYTE [DISTWIDTH] IN BLOOD BY AUTOMATED COUNT: 15.8 % (ref 10–15)
GFR SERPL CREATININE-BSD FRML MDRD: 31 ML/MIN/{1.73_M2}
GLUCOSE SERPL-MCNC: 119 MG/DL (ref 70–99)
HCT VFR BLD AUTO: 25.2 % (ref 35–47)
HGB BLD-MCNC: 7.5 G/DL (ref 11.7–15.7)
LDH SERPL L TO P-CCNC: 258 U/L (ref 81–234)
MCH RBC QN AUTO: 27.5 PG (ref 26.5–33)
MCHC RBC AUTO-ENTMCNC: 29.8 G/DL (ref 31.5–36.5)
MCV RBC AUTO: 92 FL (ref 78–100)
PLATELET # BLD AUTO: 167 10E9/L (ref 150–450)
POTASSIUM SERPL-SCNC: 4.6 MMOL/L (ref 3.4–5.3)
PROCALCITONIN SERPL-MCNC: 0.23 NG/ML
PROT SERPL-MCNC: 6.7 G/DL (ref 6.8–8.8)
RBC # BLD AUTO: 2.73 10E12/L (ref 3.8–5.2)
SODIUM SERPL-SCNC: 133 MMOL/L (ref 133–144)
SPECIMEN SOURCE: NORMAL
WBC # BLD AUTO: 4.6 10E9/L (ref 4–11)

## 2019-09-14 PROCEDURE — 99233 SBSQ HOSP IP/OBS HIGH 50: CPT | Performed by: STUDENT IN AN ORGANIZED HEALTH CARE EDUCATION/TRAINING PROGRAM

## 2019-09-14 PROCEDURE — 86612 BLASTOMYCES ANTIBODY: CPT | Performed by: STUDENT IN AN ORGANIZED HEALTH CARE EDUCATION/TRAINING PROGRAM

## 2019-09-14 PROCEDURE — 80053 COMPREHEN METABOLIC PANEL: CPT | Performed by: STUDENT IN AN ORGANIZED HEALTH CARE EDUCATION/TRAINING PROGRAM

## 2019-09-14 PROCEDURE — 25000132 ZZH RX MED GY IP 250 OP 250 PS 637: Performed by: STUDENT IN AN ORGANIZED HEALTH CARE EDUCATION/TRAINING PROGRAM

## 2019-09-14 PROCEDURE — 86698 HISTOPLASMA ANTIBODY: CPT | Performed by: STUDENT IN AN ORGANIZED HEALTH CARE EDUCATION/TRAINING PROGRAM

## 2019-09-14 PROCEDURE — 87899 AGENT NOS ASSAY W/OPTIC: CPT | Performed by: STUDENT IN AN ORGANIZED HEALTH CARE EDUCATION/TRAINING PROGRAM

## 2019-09-14 PROCEDURE — 21400000 ZZH R&B CCU UMMC

## 2019-09-14 PROCEDURE — 86481 TB AG RESPONSE T-CELL SUSP: CPT | Performed by: STUDENT IN AN ORGANIZED HEALTH CARE EDUCATION/TRAINING PROGRAM

## 2019-09-14 PROCEDURE — 25000131 ZZH RX MED GY IP 250 OP 636 PS 637: Performed by: HOSPITALIST

## 2019-09-14 PROCEDURE — 87385 HISTOPLASMA CAPSUL AG IA: CPT | Performed by: STUDENT IN AN ORGANIZED HEALTH CARE EDUCATION/TRAINING PROGRAM

## 2019-09-14 PROCEDURE — 87305 ASPERGILLUS AG IA: CPT | Performed by: STUDENT IN AN ORGANIZED HEALTH CARE EDUCATION/TRAINING PROGRAM

## 2019-09-14 PROCEDURE — 87449 NOS EACH ORGANISM AG IA: CPT | Performed by: STUDENT IN AN ORGANIZED HEALTH CARE EDUCATION/TRAINING PROGRAM

## 2019-09-14 PROCEDURE — 36415 COLL VENOUS BLD VENIPUNCTURE: CPT | Performed by: STUDENT IN AN ORGANIZED HEALTH CARE EDUCATION/TRAINING PROGRAM

## 2019-09-14 PROCEDURE — 83615 LACTATE (LD) (LDH) ENZYME: CPT | Performed by: STUDENT IN AN ORGANIZED HEALTH CARE EDUCATION/TRAINING PROGRAM

## 2019-09-14 PROCEDURE — 84145 PROCALCITONIN (PCT): CPT | Performed by: STUDENT IN AN ORGANIZED HEALTH CARE EDUCATION/TRAINING PROGRAM

## 2019-09-14 PROCEDURE — 25000132 ZZH RX MED GY IP 250 OP 250 PS 637: Performed by: HOSPITALIST

## 2019-09-14 PROCEDURE — 87116 MYCOBACTERIA CULTURE: CPT | Performed by: STUDENT IN AN ORGANIZED HEALTH CARE EDUCATION/TRAINING PROGRAM

## 2019-09-14 PROCEDURE — 86635 COCCIDIOIDES ANTIBODY: CPT | Performed by: STUDENT IN AN ORGANIZED HEALTH CARE EDUCATION/TRAINING PROGRAM

## 2019-09-14 PROCEDURE — 85027 COMPLETE CBC AUTOMATED: CPT | Performed by: STUDENT IN AN ORGANIZED HEALTH CARE EDUCATION/TRAINING PROGRAM

## 2019-09-14 RX ADMIN — PREDNISONE 2.5 MG: 2.5 TABLET ORAL at 19:33

## 2019-09-14 RX ADMIN — Medication 400 MG: at 08:50

## 2019-09-14 RX ADMIN — TACROLIMUS 2 MG: 1 CAPSULE ORAL at 08:49

## 2019-09-14 RX ADMIN — POLYETHYLENE GLYCOL 3350 17 G: 17 POWDER, FOR SOLUTION ORAL at 08:52

## 2019-09-14 RX ADMIN — ACETAMINOPHEN 975 MG: 325 TABLET, FILM COATED ORAL at 03:50

## 2019-09-14 RX ADMIN — Medication 400 MG: at 19:33

## 2019-09-14 RX ADMIN — METOPROLOL TARTRATE 50 MG: 50 TABLET ORAL at 08:52

## 2019-09-14 RX ADMIN — OXYCODONE HYDROCHLORIDE 5 MG: 5 TABLET ORAL at 23:34

## 2019-09-14 RX ADMIN — SULFAMETHOXAZOLE AND TRIMETHOPRIM 1 TABLET: 400; 80 TABLET ORAL at 08:49

## 2019-09-14 RX ADMIN — SENNOSIDES AND DOCUSATE SODIUM 2 TABLET: 8.6; 5 TABLET ORAL at 08:51

## 2019-09-14 RX ADMIN — TACROLIMUS 1.5 MG: 1 CAPSULE ORAL at 18:14

## 2019-09-14 RX ADMIN — PANTOPRAZOLE SODIUM 40 MG: 40 TABLET, DELAYED RELEASE ORAL at 19:33

## 2019-09-14 RX ADMIN — POLYETHYLENE GLYCOL 3350 17 G: 17 POWDER, FOR SOLUTION ORAL at 19:34

## 2019-09-14 RX ADMIN — PANTOPRAZOLE SODIUM 40 MG: 40 TABLET, DELAYED RELEASE ORAL at 08:51

## 2019-09-14 RX ADMIN — SENNOSIDES AND DOCUSATE SODIUM 2 TABLET: 8.6; 5 TABLET ORAL at 19:33

## 2019-09-14 RX ADMIN — METOPROLOL TARTRATE 50 MG: 50 TABLET ORAL at 19:33

## 2019-09-14 RX ADMIN — MULTIPLE VITAMINS W/ MINERALS TAB 1 TABLET: TAB at 08:51

## 2019-09-14 RX ADMIN — AMLODIPINE BESYLATE 10 MG: 10 TABLET ORAL at 08:50

## 2019-09-14 RX ADMIN — Medication 1 TABLET: at 08:50

## 2019-09-14 RX ADMIN — PREDNISONE 5 MG: 5 TABLET ORAL at 08:50

## 2019-09-14 RX ADMIN — FERROUS SULFATE TAB 325 MG (65 MG ELEMENTAL FE) 325 MG: 325 (65 FE) TAB at 08:51

## 2019-09-14 ASSESSMENT — ACTIVITIES OF DAILY LIVING (ADL)
ADLS_ACUITY_SCORE: 11

## 2019-09-14 ASSESSMENT — MIFFLIN-ST. JEOR: SCORE: 1141.54

## 2019-09-14 NOTE — PLAN OF CARE
D/I/A:  Pt here for surg evaluation of abdominal pain, cholelithiasis/empyema of L lung-had enema for ileus with +BM.  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  Minimal abdominal pain before bed.  Low grade fever-T max 99.9-tylenol given-99.1-updated team.  Up ind in room. + void/BM.  Per surg, no intervention at this time      P:  Update team with changes/concerns.

## 2019-09-14 NOTE — PROGRESS NOTES
"                              Internal Medicine Progress Note - [facility]  Kecia Blue MRN: [id number]  Age: 58 year old, : 1960  Date: 2019         Interval History:     No acute events. Doing well overall. Had an event of feeling warm followed by sweating last night. Otherwise no new complaints.     ROS:  4 point ROS including Respiratory, CV, GI and , is negative .     PHYSICAL EXAM    Vital signs:  Temp: 98.2  F (36.8  C) Temp src: Oral BP: 134/80 Pulse: 79   Resp: 18 SpO2: 95 % O2 Device: None (Room air)   Height: 162.6 cm (5' 4\") Weight: 56.7 kg (124 lb 14.4 oz)  Estimated body mass index is 21.44 kg/m  as calculated from the following:    Height as of this encounter: 1.626 m (5' 4\").    Weight as of this encounter: 56.7 kg (124 lb 14.4 oz).      Intake/Output Summary (Last 24 hours) at 2019 0823  Last data filed at 2019 0300  Gross per 24 hour   Intake 1160 ml   Output --   Net 1160 ml     GEN: NAD, resting comfortably on exam, AAox3  HEENT: NC/AT , well injected conjunctiva, EOMI, PERRL, MMM  Neck: trachea midline, no JVD  CV: RRR, nl S1/S2 no mumurs  PULM: decrease air entry at the left base (no change)  Abdomen: soft, non tender, no HSM, no massess,  BS +   EXTREMITIES: warm, trace pedal edema,   SKIN: No rashes,   NEURO: MS: AAOx3 - no focal deficit     DIAGNOSTIC STUDIES  ROUTINE ICU LABS (Last four results)  CMP  Recent Labs   Lab 19  0533 19  0536 19  2325    134 133   POTASSIUM 4.6 4.5 4.2   CHLORIDE 103 105 100   CO2 22 22 22   ANIONGAP 8 8 11   * 120* 76   BUN 32* 31* 24   CR 1.79* 1.51* 1.28*   GFRESTIMATED 31* 38* 46*   GFRESTBLACK 36* 44* 54*   CHELSEY 8.5 8.5 9.2   PROTTOTAL 6.7* 7.0 8.3   ALBUMIN 2.2* 2.3* 2.8*   BILITOTAL 0.2 0.3 0.4   ALKPHOS 220* 212* 255*   AST 20 21 29   ALT 14 14 17     CBC  Recent Labs   Lab 19  0533 19  0536 19  2325   WBC 4.6 5.0 6.1   RBC 2.73* 2.91* 3.28*   HGB 7.5* 8.0* 9.1* "   HCT 25.2* 26.8* 30.8*   MCV 92 92 94   MCH 27.5 27.5 27.7   MCHC 29.8* 29.9* 29.5*   RDW 15.8* 15.6* 15.5*    186 189     INR  Recent Labs   Lab 09/11/19  2325   INR 1.06     Arterial Blood GasNo lab results found in last 7 days.    MICROBIOLOGY  Sputum 9/12 NGTD  Blood culture x2 9/11 NGTD    IMAGING  CT chest 9/13:  There is a 4.2 x 2.5 cm empyema in the medial left costophrenic angle,  possibly complicated by left 10th rib osteomyelitis.    I reviewed all medications, laboratory results, and imaging over the past 24 hours. Notable for    Assessment and Plan:     Kecia Blue is a 58 year old female admitted on 9/11/2019. She has a history of bilateral lung transplant (3/2018) for ILD, anti-synthetase syndrome, dermatomyositis, pulmonary hypertension, chronic cough, seronegative RA, and is admitted for cholecystitis with possible choledocholithiasis and surgical evaluation was found to have LLL empyema.    #Plan for today  - Continue to hold Abx  - Hold Tylenol     #LUQ pain  #Left lower lobe empyema with possible adjacent osteomyelitis   Patient presented with LUQ. Initial imaging suggestive of biliary disease however patient presentation was not consistent with the prior. On an MRCP she was incidintaly found to have left empyema which was confirmed by chest CT.   Attempted Thoracentesis by IR on 9/13 with no success. Pulmonary discussed with primary CVTS surgeon (Dr. Hernandez). Given small size of fluid collection not keen on pursuing aggressive surgery.  That being said patient fever curve is rising of abx therapy. Will continue to hold Abx and hold tylenol.   Will discuss with ID utility of antibiotic therapy with no intervention.     -IR thoracentesis attempted and was not successful   -Sputum Cultures NGTD  - Fungitell , aspergillus galactomannan pending   -ID consult to assist with duration and regimen  -Hold Abx and fever masking agents for now   -Case discussed with Pulmonary medicine and  CVTS no intervention for now further decision based on clinical trend       #Imaging findings concerning for cholecystitis and choledocholithiasis   On admission patient had US on admission c/w cholecystitis with possible choledocholithiasis given mild dilation of bile duct to 11mm. MRCP with no choledocholithiasis however confirmed the US findings c/w possible cholecystitis.  This is likely an incidental finding and not the cause of her current LUQ pain. GI with no plan for intervention at this point unless clinically change. Surgery onboard , will address empyema as above and re-evaluate need for Lap-gisella.    - Gen surg consult in place appreciate input   - GI consulted appreciate input. No plan for ERCP unless she was going to the OR then intraoperative ERCP   - Zofran and compazine PRN  - Pain control: tylenol PRN, oxycodone 5mg PRN (decrase frequency , discontinue in the AM)    #Right knee swelling / pain   #Hisory of Dermatomyositis / seronegative RA  Currently on therapy for RA other than predinsone. Unlikely to be inflammatory per rheumatology.   Recommend orthopaedic consult.    - Orthoconsult early next week if patient is still in house  - Rheumatology consult , appreciate input   - Start Topical NSAIDs  - Prednisone as above     #S/P Hx of bilateral lung transplant 3/2018   - No active issues  *IS:  Tacrolimus 2mg/1.5mg (goal 8-10 , levels 9.5)  Predinsone 5/2.5  MMF on hold 2/2 CMV viremia   *PPX: Bactrim Qday     #Hx of CMV viremia   Off VGCV since 7/25/2019. Most recent level 356 8/26   - CMV undetectable     #Ileus/constipation, resolved  S/p pink lady enema   - senna-sarah BID     Chronic Medical Problems  HTN: cont pta amlodipine and metoprolol  CKD: admission Cr similar to baseline, likely 2/2 CNi     Consulting Services:   Pulm  ID  GI  Gen Surg    Diet: Regular   GI PPx: none   VTE prophylaxis: Ambulate   Disposition: likely next week  Family: bedside    PT/OT: N/N  Code status:  Full  Lines:  PIVs    Patient care plan discussed beside with patient, RN and patient's.     Darion Rodríguez MD  Internal Medicine Hospitalist & Staff Physician  [facility]  Pager: [phone]    Team: Denis Warren   Page Cross Cover between 5pm-8am: pager [phone] ([place]), if no response then page job code [phone].

## 2019-09-14 NOTE — PROGRESS NOTES
"    Pulmonary Medicine  Cystic Fibrosis - Lung Transplant Team  Progress Note  2019       Patient: Kecia Blue  MRN: [id number]  : 1960 (age 58 year old)  Transplant: 3/1/2018 (Lung), POD#[id number])  Admission date: 2019    Assessment & Plan:     Kecia Blue is a 58 year old female who is S/P bilateral lung transplant 3/1/18 for ILD with Pulm HTN. Post operative course complicated by right main bronchial stenosis, positive DSAs, CMV viremia, leukopenia, C difficile and CMV viremia. Other history includes chronic cough, dermatomyositis, and seronegative RA. The patient was admitted on 2019 from OSH for surgical evaluation of abdominal pain, cholelithiasis, colicystitis, and CBD dilation. We are consulted for immunosuppression management.    Recommendations:   - Plan to follow \"empyema\" clinically for now.   - Tac level on 19.   - If fevers - please obtain blood cultures and fungal cultures.   - Additional tests per ID: Fungal antibodies, Fungitell, Urine histo, serum blasto Ag, TB Quantiferon and AFB blood cultures.   - Start IV anbx - IV vanc, Zosyn and IV Micafungin if pt has signs of sepsis.    S/P bilateral lung transplant for ILD:  Last seen in pulm clinic  by Dr. Mcelroy. Baseline mildly productive cough (clear sputum). Most recent PFTs with FEV1 57% (). Upon admission patient denies dyspnea, no hypoxia, but baseline cough is with increased sputum production that has changed to yellow in color. No fever, aches chills, or leukocytosis. Exam is clear.  - CXR ordered to evaluate for infection vs pulmonary edema.  - Sputum culture and sputum fungal culture pending.  - Blood cultures () negative todate.     Immunosuppression:  - Tacrolimus 2 mg qAM/ 1.5 mg qPM.  Goal level 8-10 (d/t CKD), level was 9.5 on 19.  Next tacro level  (needs to be ordered).  - MMF on HOLD since 18 d/t CMV viremia and leukopenia. Per Dr. Mcelroy, plan is to restart when CMV " remains negative.  - Prednisone 5 mg qAM/ 2.5 qPM    Prophylaxis:   - Bactrim daily     Left UQ pain: W/u including MRI/MRCP showed left pleural space fluid collection with possible Osteo. Suspect that the cholelithiasis and choledocholithiasis was a red herring. We attempted to place pigtail in left pleural space. IR was unable to get any fluid from that space or place pigtail. Discussed with CVTS team about VATS to treat possible Empyema. At this time it was felt that the risk far outweighs benefit. It will be a morbid procedure and at this time other than imaging finding and elevated ESR (which could also be related to possible DM - Arthritis) she does not have any sx of infection. Procal is low and she had questionable fevers last night. ID consulted, appreciate input. We will continue to hold off anbx.   - ESR/CRP are significantly elevated.   - Fungitell pending.    Right main bronchial stenosis s/p dilatation: Follow with Dr. Lin for serial bronchoscopies with dilation. Last bronch 6/6 with dilation to BI.    - Next bronchoscopy is scheduled for 10/11 with Dr. Lin     H/o CMV Viremia: Valcyte discontinued 7/25. CMV PCR has been <100 since 2/21/19, but now again elevated to 662 (8/20), most recently 356 (8/26) CMV has been followed Q2 week as OP.   - CMV DNA on 9/12 was <137. I suspect her outside CMV labs have different sensitivities c/w our lab.     H/o Leukopenia: Improved since 8/7, suspect d/t discontinuation of Valcyte (as above). WBC 6.1 (9/12)  - CBC daily    Álvaro knee pain: ?related to h/o DM. She was on MMF prior to lung tx which controlled her sx. But now is off MMF due to leucopenia/recurrent CMV. Will consult rheumatology to see if there are other conservative options to treat this. I'm still hesitant to start MMF, but willing try at 250mg/day.   She has been seen by Ortho, and has failed steroid injection. She was advised that the next step with knee arthroplasty.  Rheumatology was consulted  "and appreciate input. We will proceed with obtaining Ortho input and MRI of Rt. Knee either during this stay or outpt basis.    Cholelithiasis: At this time there is no urgent clinical indication for treatment. Will monitor for now, Appr Dr. Garza (Gen Surgery) input.     We appreciate the excellent care provided by the [facility] team. Recommendations communicated via in person rounding and this note. Will continue to follow along closely, please do not hesitate to call with any questions or concerns.      Tarik Mr Fermin MD on 9/13/2019 at 3:01 PM     Subjective & Interval History:      Overall she is doing well with regards to pulm sx. No cough/SOB. No CP. She continues to have left UQ pain which has been going on for the last month.    Review of Systems:     C: no fever, no chills, no change in weight, no change in appetite  INTEGUMENTARY/SKIN: no rash or obvious new lesions  ENT/MOUTH: no sore throat, no sinus pain, no nasal congestion or drainage  RESP: see interval history  CV: no chest pain, no palpitations, no peripheral edema, no orthopnea  GI: no nausea, no vomiting, no change in stools, no reflux symptoms  : no dysuria  MUSCULOSKELETAL: no myalgias. Both knees are aching. Rt. worse than left. Rt. knee swollen.  ENDOCRINE: blood sugars with adequate control  NEURO: no headache, no numbness or tingling  PSYCHIATRIC: mood stable    Physical Exam:     Vital signs:  Temp: 98.1  F (36.7  C) Temp src: Oral BP: (!) 144/81 Pulse: 81   Resp: 18 SpO2: 95 % O2 Device: None (Room air)   Height: 162.6 cm (5' 4\") Weight: 56.7 kg (124 lb 14.4 oz)  I/O:     Intake/Output Summary (Last 24 hours) at 9/14/2019 1406  Last data filed at 9/14/2019 1200  Gross per 24 hour   Intake 960 ml   Output --   Net 960 ml     Constitutional: in no apparent distress.   HEENT: Eyes with pink conjunctivae, anicteric. Oral mucosa moist without lesions. Neck supple without lymphadenopathy.   PULM: Good air flow bilaterally. No crackles, no " rhonchi, no wheezes. Non-labored breathing on RA.  CV: Normal S1 and S2. RRR. No murmur, gallop, or rub. No peripheral edema.   ABD: NABS, soft, nontender, nondistended.    MSK: Moves all extremities. No apparent muscle wasting.   NEURO: Alert and oriented, conversant.   SKIN: Warm, dry. No rash on limited exam.   PSYCH: Mood stable.    Lines, Drains, and Devices:  Peripheral IV 09/11/19 Right Upper forearm (Active)   Site Assessment WDL 9/12/2019  8:00 PM   Line Status Infusing 9/12/2019  8:00 PM   Phlebitis Scale 0-->no symptoms 9/12/2019  8:00 PM   Infiltration Scale 0 9/12/2019 10:00 AM   Infiltration Site Treatment Method  None 9/12/2019 10:00 AM   Extravasation? No 9/12/2019 10:00 AM   Number of days: 2     Data:     LABS    CMP:   Recent Labs   Lab 09/14/19 0533 09/13/19  0536 09/11/19  2325    134 133   POTASSIUM 4.6 4.5 4.2   CHLORIDE 103 105 100   CO2 22 22 22   ANIONGAP 8 8 11   * 120* 76   BUN 32* 31* 24   CR 1.79* 1.51* 1.28*   GFRESTIMATED 31* 38* 46*   GFRESTBLACK 36* 44* 54*   CHELSEY 8.5 8.5 9.2   PROTTOTAL 6.7* 7.0 8.3   ALBUMIN 2.2* 2.3* 2.8*   BILITOTAL 0.2 0.3 0.4   ALKPHOS 220* 212* 255*   AST 20 21 29   ALT 14 14 17     CBC:   Recent Labs   Lab 09/14/19 0533 09/13/19  0536 09/11/19  2325   WBC 4.6 5.0 6.1   RBC 2.73* 2.91* 3.28*   HGB 7.5* 8.0* 9.1*   HCT 25.2* 26.8* 30.8*   MCV 92 92 94   MCH 27.5 27.5 27.7   MCHC 29.8* 29.9* 29.5*   RDW 15.8* 15.6* 15.5*    186 189       INR:   Recent Labs   Lab 09/11/19  2325   INR 1.06       Glucose:   Recent Labs   Lab 09/14/19  0533 09/13/19  0536 09/11/19  2325   * 120* 76       Blood Gas: No lab results found in last 7 days.    Culture Data   Recent Labs   Lab 09/12/19  1220 09/12/19  0002 09/11/19  2325   CULT Moderate growth  Normal alo    Light growth  Non lactose fermenting gram negative rods  *  Culture in progress  Culture negative after 20 hours No growth after 2 days No growth after 2 days       Virology Data:    Lab Results   Component Value Date    FLUAH1 Negative 03/07/2019    FLUAH3 Negative 03/07/2019    TD2253 Negative 03/07/2019    IFLUB Negative 03/07/2019    RSVA Negative 03/07/2019    RSVB Negative 03/07/2019    PIV1 Negative 03/07/2019    PIV2 Negative 03/07/2019    PIV3 Negative 03/07/2019    HMPV Negative 03/07/2019    HRVS Positive (A) 03/07/2019    ADVBE Negative 03/07/2019    ADVC Negative 03/07/2019    ADVC Negative 01/17/2019    ADVC Negative 12/14/2018       Historical CMV results (last 3 of prior testing):  Lab Results   Component Value Date    CMVQNT <137 (A) 09/12/2019    CMVQNT <137 (A) 06/05/2019    CMVQNT 494 (A) 03/07/2019     Lab Results   Component Value Date    CMVLOG <2.1 09/12/2019    CMVLOG <2.1 06/05/2019    CMVLOG 2.7 (H) 03/07/2019       Urine Studies    Recent Labs   Lab Test 09/12/19  0125 08/07/19  1512   URINEPH 7.5* 7.0   NITRITE Negative Negative   LEUKEST Negative Trace*   WBCU 3 19*       Most Recent [facility] Pulmonary Function Testing (FVC/FEV1 only)  FVC-Pre   Date Value Ref Range Status   08/07/2019 2.22 L    06/05/2019 2.26 L    03/05/2019 1.97 L    01/16/2019 1.92 L      FVC-%Pred-Pre   Date Value Ref Range Status   08/07/2019 67 %    06/05/2019 70 %    03/05/2019 60 %    01/16/2019 59 %      FEV1-Pre   Date Value Ref Range Status   08/07/2019 1.60 L    06/05/2019 1.65 L    03/05/2019 1.31 L    01/16/2019 1.04 L      FEV1-%Pred-Pre   Date Value Ref Range Status   08/07/2019 61 %    06/05/2019 64 %    03/05/2019 51 %    01/16/2019 40 %        IMAGING    Recent Results (from the past 48 hour(s))   US Abdomen Limited (RUQ)    Narrative    EXAMINATION: Limited Abdominal Ultrasound, 9/11/2019 11:50 PM     COMPARISON: CT abdomen 8/20/2018    HISTORY: Transfer from outside hospital for management of possible  acute cholecystitis    FINDINGS:   Fluid: No evidence of ascites or pleural effusions.    Liver: The liver demonstrates normal echotexture, measuring 18.9 cm in  craniocaudal  dimension. There is no focal mass.     Gallbladder: The gallbladder wall is thickened to 4 mm, however is not  significantly hyperemic on color Doppler images. Trace pericholecystic  edema Biliary sludge and cholelithiasis, including stone at the  gallbladder neck.    Bile ducts: The proximal extra hepatic bile duct is dilated to 11 mm,  increased from 8/20/2018. No intrahepatic biliary ductal dilatation.    Pancreas: Obscured by shadowing bowel gas.    Kidney: The right kidney measures 9.0 cm long. There is no  hydronephrosis or hydroureter, no shadowing renal calculi, cystic  lesion or mass.       Impression    IMPRESSION:   1.  In the proper clinical setting, findings concerning for acute  cholecystitis, including gallbladder wall thickening, pericholecystic  edema, and multiple stones at the gallbladder neck.  2.  Distention of the extrahepatic bile ducts to 11 mm, increased from  8/20/2018, possibly from distal choledocholithiasis (the distal ducts  are obscured) or recently passed stone.     I have personally reviewed the examination and initial interpretation  and I agree with the findings.    YONATAN HERNANDES MD   XR Abdomen Port 1 View    Narrative    Examination:  XR ABDOMEN PORT 1 VW    Date:  9/12/2019 11:04 AM     Clinical Information: 57 y/o female with no BM for 2 weeks - evaluate  stool burden     Findings:     Single supine radiograph of the abdomen. Dilation of small and large  bowel with normal caliber ascending and descending colon. Moderate  stool burden. The bowel within the pelvis.Phleboliths in the pelvis.  No acute osseous abnormalities.       Impression    Impression:  1. Dilation of small and large bowel. Suggestive of adynamic ileus.  Nonspecific bowel gas pattern, if further clinical concern consider  CT.  2. Moderate stool burden.     I have personally reviewed the examination and initial interpretation  and I agree with the findings.    MEHNAZ CHAPMAN MD   XR Chest 2 Views     Narrative    XR CHEST 2 VW  9/12/2019 12:42 PM      HISTORY: S/P lung transplant    COMPARISON: 8/7/2019, 6/5/2019    FINDINGS: Single view of the chest. Status post bilateral lung  transplants. Trachea is midline, heart size is stable. Slightly  increased mild perihilar interstitial streaky opacities. No pleural  effusion or pneumothorax. No acute osseous or upper abdominal  abnormality.      Impression    IMPRESSION:  1. Status post bilateral lung transplant.  2. Cardiomegaly with slight increase of bilateral perihilar streaky  opacities, favor mild pulmonary edema.    I have personally reviewed the examination and initial interpretation  and I agree with the findings.    ADAM GONZALEZ MD   MR Abdomen MRCP w/o & w Contrast   Result Value    Radiologist flags Possible empyema versus abscess at the left (Urgent)    Narrative    MRI ABDOMEN    CLINICAL HISTORY:  Dilated common bile duct on ultrasound exam.    TECHNIQUE:  Images were acquired with and without intravenous contrast  through the abdomen. The following MR images were acquired: TrueFISP,  multiplanar T2 weighted, axial T1 in/out of phase, axial fat-saturated  T1, diffusion-weighted. Multiplanar T1-weighted images with fat  saturation were obtained before contrast administration and at  multiple time points following the administration of intravenous  contrast. Contrast dose: 7.5ml of GADAVIST    FINDINGS:    Comparison study: Ultrasound abdomen 9/11/2019.    Liver: Normal T1 and T2 signal in the liver. Small cyst in the  posterior hepatic dome. No evidence of signal dropout on out of phase  imaging.    Gallbladder/biliary tree: The gallbladder is dilated to 4.1 cm in  transverse diameter with surrounding pericholecystic fluid. Multiple  dependent stones in the gallbladder. The common bile duct is dilated  at 12 mm.    Spleen: The spleen is the upper limits of normal at 12.8 cm in  craniocaudal dimension.    Kidneys: Bilateral simple renal cysts. No  hydronephrosis.    Adrenal glands: Unremarkable    Pancreas: Normal appearance of the pancreas.    Bowel: Normal diameter small bowel and colon. No diverticulitis.    Lymph nodes: No lymphadenopathy by size criteria.    Blood vessels: Patent major abdominal vasculature.    Lung bases: Rim-enhancing fluid-filled structure in the posterior left  hemithorax measuring 4.5 x 3.0 cm (series 22 image 14 and series 9  image 27). Small bilateral pleural effusions.    Bones and soft tissues: Normal bone marrow signal.    Ascites: Small amount of ascites.      Impression    IMPRESSION:   1. Rim-enhancing fluid-filled structure in the posterior left  hemithorax measuring 4.5 x 3.0 cm (series 22 image 14 and series 9  image 27). Findings are concerning for abscess versus empyema.  Follow-up is recommended using chest CT until resolution.  2. Distended gallbladder with pericholecystic fluid, gallstones, and  dilated common bile duct at 12 mm. No choledocholithiasis or  obstructing mass.    [Urgent Result: Possible empyema versus abscess at the left  costophrenic angle]    Finding was identified on 9/12/2019 5:33 PM.     Thi was contacted by Dr. James at 9/12/2019 5:48 PM and verbalized  understanding of the urgent finding.    I have personally reviewed the examination and initial interpretation  and I agree with the findings.    DEAN PARKER MD   CT Chest w Contrast    Narrative    EXAMINATION: Chest CT  9/12/2019 6:51 PM    CLINICAL HISTORY: 67-year-old post lung transplant, fluid collection  on MRCP    COMPARISON: MRCP today.    TECHNIQUE: CT imaging obtained through the chest with contrast.  Coronal and axial MIP reformatted images obtained.     FINDINGS:  Postsurgical changes of bilateral lung consolidation. Normal-sized  heart and great vessels. No mediastinal lymphadenopathy.    There is a 4.2 x 2.5 cm rim enhancing fluid collection in the in the  medial left costophrenic angle (series 2 image 40 and sagittal  image  46). The collection surrounds the anterior surface of the the left  10th rib (series 7 image 46) with no fat plane between, with possible  erosion into the bone. Small bilateral pleural effusions.    No suspicious pulmonary nodule. Atelectasis in the posterior lower  lobes.    Bones and soft tissues: Mild periosteal reaction and possible erosions  in the posterior left 10th rib seen best on the sagittal views.    Partially imaged upper abdomen: Cholelithiasis and dilated common bile  duct and gallbladder.      Impression    IMPRESSION:  There is a 4.2 x 2.5 cm empyema in the medial left costophrenic angle,  possibly complicated by left 10th rib osteomyelitis.    I have personally reviewed the examination and initial interpretation  and I agree with the findings.    DEAN PARKER MD   IR Thoracentesis    Narrative    PRE-PROCEDURE DIAGNOSIS: Left pleural effusion    PROCEDURE: IR THORACENTESIS    Impression    IMPRESSION: Failed thoracentesis, unable to obtain fluid for  diagnostic testing using two separate punctures under ultrasound  guidance.     PLAN: Follow-up per primary medicine an infectious disease team's.    ----------    CLINICAL HISTORY: Patient with interstitial lung disease status post  bilateral lung transplant March 20, 2018 admitted with left  abdominal/chest pain found have left pleural effusion concerning for  empyema and possibly adjacent left rib osteomyelitis. IR has been  consulted for diagnostic left thoracentesis and possible chest tube  placement.     COMPARISON: CT chest and MR abdomen 9/12/2019    PERFORMED BY: Kenneth Sparrow PA-C and Case Amaya MD    CONSENT: Written informed consent was obtained and is documented in  the patient record.    MEDICATIONS: 1% lidocaine for local anesthesia      NURSING: Patient continuously monitored by trained, independent  observer.     DESCRIPTION: Targeted ultrasound shows small complex effusion on the  left consistent with recent CT. Patient  positioned upright and the  skin overlying the left pleural effusion was prepped and draped in the  usual sterile fashion.    Under continuous ultrasound visualization, the skin and pleura was  anesthetized before a centesis needle/catheter system was advanced  into the effusion, however no fluid was able to be aspirated. A wire  would not track either so everything was removed. Further ultrasound 1  rib space superior showed effusion and a second centesis needle was  advanced into this collection, however no fluid was aspirated.  Occlusive dressings applied. Primary medicine team updated.     COMPLICATIONS: No immediate concerns, the patient remained stable  throughout the procedure and tolerated it well.    ESTIMATED BLOOD LOSS: Minimal    SPECIMENS: None    URMILA WOODS PA-C

## 2019-09-14 NOTE — PLAN OF CARE
Met with pt and , strongly recommend ortho consult and MRI of R knee if ortho agrees during this hospitalization.    Sam Nevarez MD

## 2019-09-14 NOTE — PLAN OF CARE
"D: Pt admitted on 9/11 for cholecystitis/abdominal pain; empyema discovered in left lower lobe via chest CT. Pt has a hx of bilateral lung tx (3/2018) for ILD with pulmonary HTN, chronic cough, dermatomyositis, and seronegative RA.    I/A: A&Ox4, VSS, afebrile today with tmax 98.9, sats 96% on RA. Pt continues to have chronic cough, sputum amt seems to be increasing per pt; lungs clear, although greatly diminished on left side. BM x2 today, reports abdominal pain is minimal, \"much better\" per pt report, denied the need for any pain medication. Urine specimen sent for histoplasma capsulatum AGN, ten additional  lab tests ordered (fungal antibodies, AFB blood cultures, etc.) and drawn per ID recommendations.       P:  Continue to monitor fevers, labs, etc. Continue to monitor for abdominal pain, provide bowel regimen as ordered to prevent additional abdominal pressure from stool. Contact [place] [phone] Medicine Team with questions or concerns.    Rona Blackwell RN  Cardiology  "

## 2019-09-15 ENCOUNTER — APPOINTMENT (OUTPATIENT)
Dept: MRI IMAGING | Facility: CLINIC | Age: 59
DRG: 205 | End: 2019-09-15
Attending: STUDENT IN AN ORGANIZED HEALTH CARE EDUCATION/TRAINING PROGRAM
Payer: COMMERCIAL

## 2019-09-15 ENCOUNTER — PATIENT OUTREACH (OUTPATIENT)
Dept: CARE COORDINATION | Facility: CLINIC | Age: 59
End: 2019-09-15

## 2019-09-15 VITALS
TEMPERATURE: 98.7 F | HEIGHT: 64 IN | BODY MASS INDEX: 21.19 KG/M2 | OXYGEN SATURATION: 90 % | DIASTOLIC BLOOD PRESSURE: 85 MMHG | RESPIRATION RATE: 16 BRPM | WEIGHT: 124.1 LBS | HEART RATE: 88 BPM | SYSTOLIC BLOOD PRESSURE: 147 MMHG

## 2019-09-15 LAB
ALBUMIN SERPL-MCNC: 2.2 G/DL (ref 3.4–5)
ALP SERPL-CCNC: 228 U/L (ref 40–150)
ALT SERPL W P-5'-P-CCNC: 19 U/L (ref 0–50)
ANION GAP SERPL CALCULATED.3IONS-SCNC: 11 MMOL/L (ref 3–14)
AST SERPL W P-5'-P-CCNC: 27 U/L (ref 0–45)
BILIRUB SERPL-MCNC: 0.7 MG/DL (ref 0.2–1.3)
BUN SERPL-MCNC: 25 MG/DL (ref 7–30)
CALCIUM SERPL-MCNC: 8.6 MG/DL (ref 8.5–10.1)
CHLORIDE SERPL-SCNC: 104 MMOL/L (ref 94–109)
CO2 SERPL-SCNC: 22 MMOL/L (ref 20–32)
CREAT SERPL-MCNC: 1.6 MG/DL (ref 0.52–1.04)
GFR SERPL CREATININE-BSD FRML MDRD: 35 ML/MIN/{1.73_M2}
GLUCOSE SERPL-MCNC: 95 MG/DL (ref 70–99)
POTASSIUM SERPL-SCNC: 4.2 MMOL/L (ref 3.4–5.3)
PROT SERPL-MCNC: 7 G/DL (ref 6.8–8.8)
SODIUM SERPL-SCNC: 136 MMOL/L (ref 133–144)

## 2019-09-15 PROCEDURE — 25000132 ZZH RX MED GY IP 250 OP 250 PS 637: Performed by: HOSPITALIST

## 2019-09-15 PROCEDURE — 25000131 ZZH RX MED GY IP 250 OP 636 PS 637: Performed by: HOSPITALIST

## 2019-09-15 PROCEDURE — 73721 MRI JNT OF LWR EXTRE W/O DYE: CPT | Mod: RT

## 2019-09-15 PROCEDURE — 25000132 ZZH RX MED GY IP 250 OP 250 PS 637: Performed by: STUDENT IN AN ORGANIZED HEALTH CARE EDUCATION/TRAINING PROGRAM

## 2019-09-15 PROCEDURE — 80053 COMPREHEN METABOLIC PANEL: CPT | Performed by: STUDENT IN AN ORGANIZED HEALTH CARE EDUCATION/TRAINING PROGRAM

## 2019-09-15 PROCEDURE — 36415 COLL VENOUS BLD VENIPUNCTURE: CPT | Performed by: STUDENT IN AN ORGANIZED HEALTH CARE EDUCATION/TRAINING PROGRAM

## 2019-09-15 PROCEDURE — 99239 HOSP IP/OBS DSCHRG MGMT >30: CPT | Performed by: STUDENT IN AN ORGANIZED HEALTH CARE EDUCATION/TRAINING PROGRAM

## 2019-09-15 RX ORDER — LEVOFLOXACIN 750 MG/1
750 TABLET, FILM COATED ORAL EVERY OTHER DAY
Qty: 5 TABLET | Refills: 0 | Status: SHIPPED | OUTPATIENT
Start: 2019-09-15 | End: 2019-09-26

## 2019-09-15 RX ORDER — AMOXICILLIN 250 MG
2 CAPSULE ORAL 2 TIMES DAILY PRN
Qty: 60 TABLET | Refills: 0 | Status: SHIPPED | OUTPATIENT
Start: 2019-09-15 | End: 2020-04-08

## 2019-09-15 RX ORDER — FERROUS SULFATE 325(65) MG
325 TABLET ORAL
Qty: 60 TABLET | Refills: 2 | Status: ON HOLD | OUTPATIENT
Start: 2019-09-15 | End: 2021-02-25

## 2019-09-15 RX ORDER — GABAPENTIN 300 MG/1
CAPSULE ORAL
Qty: 60 CAPSULE | Refills: 0 | Status: ON HOLD | OUTPATIENT
Start: 2019-09-15 | End: 2019-10-09

## 2019-09-15 RX ADMIN — POLYETHYLENE GLYCOL 3350 17 G: 17 POWDER, FOR SOLUTION ORAL at 10:48

## 2019-09-15 RX ADMIN — Medication 400 MG: at 10:50

## 2019-09-15 RX ADMIN — MULTIPLE VITAMINS W/ MINERALS TAB 1 TABLET: TAB at 10:50

## 2019-09-15 RX ADMIN — PREDNISONE 5 MG: 5 TABLET ORAL at 10:49

## 2019-09-15 RX ADMIN — AMLODIPINE BESYLATE 10 MG: 10 TABLET ORAL at 10:49

## 2019-09-15 RX ADMIN — METOPROLOL TARTRATE 50 MG: 50 TABLET ORAL at 10:50

## 2019-09-15 RX ADMIN — SENNOSIDES AND DOCUSATE SODIUM 2 TABLET: 8.6; 5 TABLET ORAL at 10:50

## 2019-09-15 RX ADMIN — FERROUS SULFATE TAB 325 MG (65 MG ELEMENTAL FE) 325 MG: 325 (65 FE) TAB at 10:50

## 2019-09-15 RX ADMIN — Medication 1 TABLET: at 10:50

## 2019-09-15 RX ADMIN — TACROLIMUS 2 MG: 1 CAPSULE ORAL at 10:49

## 2019-09-15 RX ADMIN — PANTOPRAZOLE SODIUM 40 MG: 40 TABLET, DELAYED RELEASE ORAL at 10:50

## 2019-09-15 RX ADMIN — SULFAMETHOXAZOLE AND TRIMETHOPRIM 1 TABLET: 400; 80 TABLET ORAL at 10:49

## 2019-09-15 ASSESSMENT — ACTIVITIES OF DAILY LIVING (ADL)
ADLS_ACUITY_SCORE: 11

## 2019-09-15 ASSESSMENT — MIFFLIN-ST. JEOR: SCORE: 1137.91

## 2019-09-15 NOTE — PLAN OF CARE
D/I/A:  Pt here for surg evaluation of abdominal pain, cholelithiasis/empyema of L lung.  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  Pt stable overnight.  Minimal abdominal pain prior to bed-oxy given.    P:  Awaiting lab/culture results.  Update team with changes/concerns.

## 2019-09-15 NOTE — PROGRESS NOTES
"    Pulmonary Medicine  Cystic Fibrosis - Lung Transplant Team  Progress Note  September 15, 2019       Patient: Kecia Blue  MRN: [id number]  : 1960 (age 58 year old)  Transplant: 3/1/2018 (Lung), POD#[id number])  Admission date: 2019    Assessment & Plan:     Kecia Blue is a 58 year old female who is S/P bilateral lung transplant 3/1/18 for ILD with Pulm HTN. Post operative course complicated by right main bronchial stenosis, positive DSAs, CMV viremia, leukopenia, C difficile and CMV viremia. Other history includes chronic cough, dermatomyositis, and seronegative RA. The patient was admitted on 2019 from OSH for surgical evaluation of abdominal pain, cholelithiasis, colicystitis, and CBD dilation. We are consulted for immunosuppression management.    Recommendations:   - Plan to follow \"empyema\" clinically for now with no anbx.   - Tac level, CBC/BMP, CRP/ESR and CMV in one week.   - If fevers - please obtain blood cultures and fungal cultures.   - Additional tests per ID: Fungal antibodies, Fungitell, Urine histo, serum blasto Ag, TB Quantiferon and AFB blood cultures pending.   - F/u with Ortho on 10/10, Ame SIN too.   - Chest CT on 10/10/19 without contrast.   - Rheum visit on 10/9/19.   - Levaquin for 10days to treat Stenotrophomonas in sputum.   - Consider Neurontin for pain management (Left UQ pain) if needed.    S/P bilateral lung transplant for ILD:  Last seen in pulm clinic  by Dr. Mcelroy. Baseline mildly productive cough (clear sputum). Most recent PFTs with FEV1 57% (). Upon admission patient denies dyspnea, no hypoxia, but baseline cough is with increased sputum production that has changed to yellow in color. No fever, aches chills, or leukocytosis. Exam is clear.  - CXR ordered to evaluate for infection vs pulmonary edema.  - Sputum culture growing Stenotrophomonas (sensitivities pending) and sputum fungal culture is neg to date.  - Blood cultures () negative " todate.     Immunosuppression:  - Tacrolimus 2 mg qAM/ 1.5 mg qPM.  Goal level 8-10 (d/t CKD), level was 9.5 on 9/13/19.  Next tacro level 9/17 (needs to be ordered).  - MMF on HOLD since 11/5/18 d/t CMV viremia and leukopenia. Per Dr. Mcelroy, plan is to restart when CMV remains negative.  - Prednisone 5 mg qAM/ 2.5 qPM    Prophylaxis:   - Bactrim daily     Left UQ pain: W/u including MRI/MRCP showed left pleural space fluid collection with possible Osteo. Suspect that the cholelithiasis and choledocholithiasis was a red herring. We attempted to place pigtail in left pleural space. IR was unable to get any fluid from that space or place pigtail. Discussed with CVTS team about VATS to treat possible Empyema. At this time it was felt that the risk far outweighs benefit. It will be a morbid procedure and at this time other than imaging finding and elevated ESR (which could also be related to possible DM - Arthritis) she does not have any sx of infection. Procal is low and she had questionable fevers last night. ID consulted, appreciate input. We will continue to hold off anbx.   - ESR/CRP are significantly elevated. Please do these again in one week and at next clinic visit.   - Fungitell pending.    Right main bronchial stenosis s/p dilatation: Follow with Dr. Lin for serial bronchoscopies with dilation. Last bronch 6/6 with dilation to BI.    - Next bronchoscopy is scheduled for 10/11 with Dr. Lin     H/o CMV Viremia: Valcyte discontinued 7/25. CMV PCR has been <100 since 2/21/19, but now again elevated to 662 (8/20), most recently 356 (8/26) CMV has been followed Q2 week as OP.   - CMV DNA on 9/12 was <137. I suspect her outside CMV labs have different sensitivities c/w our lab.     H/o Leukopenia: Improved since 8/7, suspect d/t discontinuation of Valcyte (as above). WBC 6.1 (9/12)    Álvaro knee pain: ?related to h/o DM. She was on MMF prior to lung tx which controlled her sx. But now is off MMF due to  leucopenia/recurrent CMV. Will consult rheumatology to see if there are other conservative options to treat this. I'm still hesitant to start MMF, but willing try at 250mg/day.   She has been seen by Ortho outpt, and has failed steroid injection. She was advised that the next step with knee arthroplasty.  Rheumatology was consulted and appreciate input. MRI of Rt knee is also consistent with increased activity of RA. Plan is to follow up with Rheum on 10/9/19. We will try to have see Ortho on 10/10/19.    Cholelithiasis: At this time there is no urgent clinical indication for treatment. Will monitor for now, Appr Dr. Garza (Gen Surgery) input.     We appreciate the excellent care provided by the [facility] team. Recommendations communicated via in person rounding and this note. Will continue to follow along closely, please do not hesitate to call with any questions or concerns.      Tarik Christensen MD on 9/13/2019 at 3:01 PM     Subjective & Interval History:      Overall she is doing well with regards to pulm sx. No SOB. No CP. The Left UQ pain is better. Over the last 24 - 36hrs has noted increased cough and even at night. It is quite bothersome.    Review of Systems:     C: no fever, no chills, no change in weight, no change in appetite  INTEGUMENTARY/SKIN: no rash or obvious new lesions  ENT/MOUTH: no sore throat, no sinus pain, no nasal congestion or drainage  RESP: see interval history  CV: no chest pain, no palpitations, no peripheral edema, no orthopnea  GI: no nausea, no vomiting, no change in stools, no reflux symptoms  : no dysuria  MUSCULOSKELETAL: no myalgias. Both knees are aching. Rt. worse than left. Rt. knee swollen.  ENDOCRINE: blood sugars with adequate control  NEURO: no headache, no numbness or tingling  PSYCHIATRIC: mood stable    Physical Exam:     Vital signs:  Temp: 98.7  F (37.1  C) Temp src: Oral BP: (!) 147/85(nurse notified, took twice) Pulse: 88   Resp: 16 SpO2: 90 %(nurse notified)  "O2 Device: None (Room air)   Height: 162.6 cm (5' 4\") Weight: 56.3 kg (124 lb 1.6 oz)  I/O:     Intake/Output Summary (Last 24 hours) at 9/15/2019 1425  Last data filed at 9/15/2019 0957  Gross per 24 hour   Intake 420 ml   Output --   Net 420 ml     Constitutional: in no apparent distress.   HEENT: Eyes with pink conjunctivae, anicteric. Oral mucosa moist without lesions. Neck supple without lymphadenopathy.   PULM: Good air flow bilaterally. No crackles, no rhonchi, no wheezes. Non-labored breathing on RA.  CV: Normal S1 and S2. RRR. No murmur, gallop, or rub. No peripheral edema.   ABD: NABS, soft, nontender, nondistended.    MSK: Moves all extremities. No apparent muscle wasting.   NEURO: Alert and oriented, conversant.   SKIN: Warm, dry. No rash on limited exam.   PSYCH: Mood stable.    Lines, Drains, and Devices:  Peripheral IV 09/11/19 Right Upper forearm (Active)   Site Assessment WDL 9/12/2019  8:00 PM   Line Status Infusing 9/12/2019  8:00 PM   Phlebitis Scale 0-->no symptoms 9/12/2019  8:00 PM   Infiltration Scale 0 9/12/2019 10:00 AM   Infiltration Site Treatment Method  None 9/12/2019 10:00 AM   Extravasation? No 9/12/2019 10:00 AM   Number of days: 2     Data:     LABS    CMP:   Recent Labs   Lab 09/15/19  0711 09/14/19  0533 09/13/19  0536 09/11/19  2325    133 134 133   POTASSIUM 4.2 4.6 4.5 4.2   CHLORIDE 104 103 105 100   CO2 22 22 22 22   ANIONGAP 11 8 8 11   GLC 95 119* 120* 76   BUN 25 32* 31* 24   CR 1.60* 1.79* 1.51* 1.28*   GFRESTIMATED 35* 31* 38* 46*   GFRESTBLACK 41* 36* 44* 54*   CHELSEY 8.6 8.5 8.5 9.2   PROTTOTAL 7.0 6.7* 7.0 8.3   ALBUMIN 2.2* 2.2* 2.3* 2.8*   BILITOTAL 0.7 0.2 0.3 0.4   ALKPHOS 228* 220* 212* 255*   AST 27 20 21 29   ALT 19 14 14 17     CBC:   Recent Labs   Lab 09/14/19  0533 09/13/19  0536 09/11/19  2325   WBC 4.6 5.0 6.1   RBC 2.73* 2.91* 3.28*   HGB 7.5* 8.0* 9.1*   HCT 25.2* 26.8* 30.8*   MCV 92 92 94   MCH 27.5 27.5 27.7   MCHC 29.8* 29.9* 29.5*   RDW 15.8* " 15.6* 15.5*    186 189       INR:   Recent Labs   Lab 09/11/19  2325   INR 1.06       Glucose:   Recent Labs   Lab 09/15/19  0711 09/14/19  0533 09/13/19  0536 09/11/19  2325   GLC 95 119* 120* 76       Blood Gas: No lab results found in last 7 days.    Culture Data   Recent Labs   Lab 09/14/19  1625 09/12/19  1220 09/12/19  0002 09/11/19  2325   CULT No growth after 21 hours Moderate growth  Normal alo    Light growth  Stenotrophomonas maltophilia  Susceptibility testing in progress  *  Culture in progress  Culture negative after 20 hours No growth after 3 days No growth after 3 days       Virology Data:   Lab Results   Component Value Date    FLUAH1 Negative 03/07/2019    FLUAH3 Negative 03/07/2019    [id number] Negative 03/07/2019    IFLUB Negative 03/07/2019    RSVA Negative 03/07/2019    RSVB Negative 03/07/2019    PIV1 Negative 03/07/2019    PIV2 Negative 03/07/2019    PIV3 Negative 03/07/2019    HMPV Negative 03/07/2019    HRVS Positive (A) 03/07/2019    ADVBE Negative 03/07/2019    ADVC Negative 03/07/2019    ADVC Negative 01/17/2019    ADVC Negative 12/14/2018       Historical CMV results (last 3 of prior testing):  Lab Results   Component Value Date    CMVQNT <137 (A) 09/12/2019    CMVQNT <137 (A) 06/05/2019    CMVQNT 494 (A) 03/07/2019     Lab Results   Component Value Date    CMVLOG <2.1 09/12/2019    CMVLOG <2.1 06/05/2019    CMVLOG 2.7 (H) 03/07/2019       Urine Studies    Recent Labs   Lab Test 09/12/19  0125 08/07/19  1512   URINEPH 7.5* 7.0   NITRITE Negative Negative   LEUKEST Negative Trace*   WBCU 3 19*       Most Recent [facility] Pulmonary Function Testing (FVC/FEV1 only)  FVC-Pre   Date Value Ref Range Status   08/07/2019 2.22 L    06/05/2019 2.26 L    03/05/2019 1.97 L    01/16/2019 1.92 L      FVC-%Pred-Pre   Date Value Ref Range Status   08/07/2019 67 %    06/05/2019 70 %    03/05/2019 60 %    01/16/2019 59 %      FEV1-Pre   Date Value Ref Range Status   08/07/2019 1.60 L    06/05/2019  1.65 L    03/05/2019 1.31 L    01/16/2019 1.04 L      FEV1-%Pred-Pre   Date Value Ref Range Status   08/07/2019 61 %    06/05/2019 64 %    03/05/2019 51 %    01/16/2019 40 %        IMAGING    Recent Results (from the past 48 hour(s))   US Abdomen Limited (RUQ)    Narrative    EXAMINATION: Limited Abdominal Ultrasound, 9/11/2019 11:50 PM     COMPARISON: CT abdomen 8/20/2018    HISTORY: Transfer from outside hospital for management of possible  acute cholecystitis    FINDINGS:   Fluid: No evidence of ascites or pleural effusions.    Liver: The liver demonstrates normal echotexture, measuring 18.9 cm in  craniocaudal dimension. There is no focal mass.     Gallbladder: The gallbladder wall is thickened to 4 mm, however is not  significantly hyperemic on color Doppler images. Trace pericholecystic  edema Biliary sludge and cholelithiasis, including stone at the  gallbladder neck.    Bile ducts: The proximal extra hepatic bile duct is dilated to 11 mm,  increased from 8/20/2018. No intrahepatic biliary ductal dilatation.    Pancreas: Obscured by shadowing bowel gas.    Kidney: The right kidney measures 9.0 cm long. There is no  hydronephrosis or hydroureter, no shadowing renal calculi, cystic  lesion or mass.       Impression    IMPRESSION:   1.  In the proper clinical setting, findings concerning for acute  cholecystitis, including gallbladder wall thickening, pericholecystic  edema, and multiple stones at the gallbladder neck.  2.  Distention of the extrahepatic bile ducts to 11 mm, increased from  8/20/2018, possibly from distal choledocholithiasis (the distal ducts  are obscured) or recently passed stone.     I have personally reviewed the examination and initial interpretation  and I agree with the findings.    YONATAN HERNANDES MD   XR Abdomen Port 1 View    Narrative    Examination:  XR ABDOMEN PORT 1 VW    Date:  9/12/2019 11:04 AM     Clinical Information: 59 y/o female with no BM for 2 weeks - evaluate  stool  burden     Findings:     Single supine radiograph of the abdomen. Dilation of small and large  bowel with normal caliber ascending and descending colon. Moderate  stool burden. The bowel within the pelvis.Phleboliths in the pelvis.  No acute osseous abnormalities.       Impression    Impression:  1. Dilation of small and large bowel. Suggestive of adynamic ileus.  Nonspecific bowel gas pattern, if further clinical concern consider  CT.  2. Moderate stool burden.     I have personally reviewed the examination and initial interpretation  and I agree with the findings.    MEHNAZ CHAPMAN MD   XR Chest 2 Views    Narrative    XR CHEST 2 VW  9/12/2019 12:42 PM      HISTORY: S/P lung transplant    COMPARISON: 8/7/2019, 6/5/2019    FINDINGS: Single view of the chest. Status post bilateral lung  transplants. Trachea is midline, heart size is stable. Slightly  increased mild perihilar interstitial streaky opacities. No pleural  effusion or pneumothorax. No acute osseous or upper abdominal  abnormality.      Impression    IMPRESSION:  1. Status post bilateral lung transplant.  2. Cardiomegaly with slight increase of bilateral perihilar streaky  opacities, favor mild pulmonary edema.    I have personally reviewed the examination and initial interpretation  and I agree with the findings.    ADAM GONZALEZ MD   MR Abdomen MRCP w/o & w Contrast   Result Value    Radiologist flags Possible empyema versus abscess at the left (Urgent)    Narrative    MRI ABDOMEN    CLINICAL HISTORY:  Dilated common bile duct on ultrasound exam.    TECHNIQUE:  Images were acquired with and without intravenous contrast  through the abdomen. The following MR images were acquired: TrueFISP,  multiplanar T2 weighted, axial T1 in/out of phase, axial fat-saturated  T1, diffusion-weighted. Multiplanar T1-weighted images with fat  saturation were obtained before contrast administration and at  multiple time points following the administration of  intravenous  contrast. Contrast dose: 7.5ml of GADAVIST    FINDINGS:    Comparison study: Ultrasound abdomen 9/11/2019.    Liver: Normal T1 and T2 signal in the liver. Small cyst in the  posterior hepatic dome. No evidence of signal dropout on out of phase  imaging.    Gallbladder/biliary tree: The gallbladder is dilated to 4.1 cm in  transverse diameter with surrounding pericholecystic fluid. Multiple  dependent stones in the gallbladder. The common bile duct is dilated  at 12 mm.    Spleen: The spleen is the upper limits of normal at 12.8 cm in  craniocaudal dimension.    Kidneys: Bilateral simple renal cysts. No hydronephrosis.    Adrenal glands: Unremarkable    Pancreas: Normal appearance of the pancreas.    Bowel: Normal diameter small bowel and colon. No diverticulitis.    Lymph nodes: No lymphadenopathy by size criteria.    Blood vessels: Patent major abdominal vasculature.    Lung bases: Rim-enhancing fluid-filled structure in the posterior left  hemithorax measuring 4.5 x 3.0 cm (series 22 image 14 and series 9  image 27). Small bilateral pleural effusions.    Bones and soft tissues: Normal bone marrow signal.    Ascites: Small amount of ascites.      Impression    IMPRESSION:   1. Rim-enhancing fluid-filled structure in the posterior left  hemithorax measuring 4.5 x 3.0 cm (series 22 image 14 and series 9  image 27). Findings are concerning for abscess versus empyema.  Follow-up is recommended using chest CT until resolution.  2. Distended gallbladder with pericholecystic fluid, gallstones, and  dilated common bile duct at 12 mm. No choledocholithiasis or  obstructing mass.    [Urgent Result: Possible empyema versus abscess at the left  costophrenic angle]    Finding was identified on 9/12/2019 5:33 PM.     Thi was contacted by Dr. James at 9/12/2019 5:48 PM and verbalized  understanding of the urgent finding.    I have personally reviewed the examination and initial interpretation  and I agree with  the findings.    DEAN PARKER MD   CT Chest w Contrast    Narrative    EXAMINATION: Chest CT  9/12/2019 6:51 PM    CLINICAL HISTORY: 67-year-old post lung transplant, fluid collection  on MRCP    COMPARISON: MRCP today.    TECHNIQUE: CT imaging obtained through the chest with contrast.  Coronal and axial MIP reformatted images obtained.     FINDINGS:  Postsurgical changes of bilateral lung consolidation. Normal-sized  heart and great vessels. No mediastinal lymphadenopathy.    There is a 4.2 x 2.5 cm rim enhancing fluid collection in the in the  medial left costophrenic angle (series 2 image 40 and sagittal image  46). The collection surrounds the anterior surface of the the left  10th rib (series 7 image 46) with no fat plane between, with possible  erosion into the bone. Small bilateral pleural effusions.    No suspicious pulmonary nodule. Atelectasis in the posterior lower  lobes.    Bones and soft tissues: Mild periosteal reaction and possible erosions  in the posterior left 10th rib seen best on the sagittal views.    Partially imaged upper abdomen: Cholelithiasis and dilated common bile  duct and gallbladder.      Impression    IMPRESSION:  There is a 4.2 x 2.5 cm empyema in the medial left costophrenic angle,  possibly complicated by left 10th rib osteomyelitis.    I have personally reviewed the examination and initial interpretation  and I agree with the findings.    DEAN PARKER MD   IR Thoracentesis    Narrative    PRE-PROCEDURE DIAGNOSIS: Left pleural effusion    PROCEDURE: IR THORACENTESIS    Impression    IMPRESSION: Failed thoracentesis, unable to obtain fluid for  diagnostic testing using two separate punctures under ultrasound  guidance.     PLAN: Follow-up per primary medicine an infectious disease team's.    ----------    CLINICAL HISTORY: Patient with interstitial lung disease status post  bilateral lung transplant March 20, 2018 admitted with left  abdominal/chest pain found have left pleural  effusion concerning for  empyema and possibly adjacent left rib osteomyelitis. IR has been  consulted for diagnostic left thoracentesis and possible chest tube  placement.     COMPARISON: CT chest and MR abdomen 9/12/2019    PERFORMED BY: Kenneth Sparrow PA-C and Case Amaya MD    CONSENT: Written informed consent was obtained and is documented in  the patient record.    MEDICATIONS: 1% lidocaine for local anesthesia      NURSING: Patient continuously monitored by trained, independent  observer.     DESCRIPTION: Targeted ultrasound shows small complex effusion on the  left consistent with recent CT. Patient positioned upright and the  skin overlying the left pleural effusion was prepped and draped in the  usual sterile fashion.    Under continuous ultrasound visualization, the skin and pleura was  anesthetized before a centesis needle/catheter system was advanced  into the effusion, however no fluid was able to be aspirated. A wire  would not track either so everything was removed. Further ultrasound 1  rib space superior showed effusion and a second centesis needle was  advanced into this collection, however no fluid was aspirated.  Occlusive dressings applied. Primary medicine team updated.     COMPLICATIONS: No immediate concerns, the patient remained stable  throughout the procedure and tolerated it well.    ESTIMATED BLOOD LOSS: Minimal    SPECIMENS: None    URMILA SPARROW PA-C

## 2019-09-15 NOTE — DISCHARGE SUMMARY
Discharge Summary    Kecia Blue MRN# [id number]   YOB: 1960 Age: 58 year old     Date of Admission:  9/11/2019  Date of Discharge:  9/15/2019  Admitting Physician:  Donny Shaw MD  Discharge Physician:  Christelle Lopez MD   Discharging Service:  Internal Medicine     Primary Provider: Rosy Vu          Outpatient Providers To Do:   Follow up with PCP in 1 week for  - CBC, BMP, CMV PCR, Pro calcitonin (please route to pulmonary medicine)    Follow up arranged with Rheum on 10/9  Referral to Orthopaedics placed. Date to be decided  Has follow up with Pulmonary medicine on 10/10         Discharge Diagnosis:     Active Problems:    Lung transplant recipient (H)    Cytomegalovirus (CMV) viremia (H)    Cholecystitis    Empyema of lung (H)    Administration of long-term prophylactic antibiotics               Discharge Disposition:     Discharged to home           Condition on Discharge:     Discharge condition: Stable   Code status on discharge: Full Code           Procedures:   9/13:  Left chest tube placement and or diagnostic thoracentesis due to left pleural effusion concerning for empyema. No diagnostic fluid able to be obtained using 2 separate punctures.         Discharge Medications:     Current Discharge Medication List      START taking these medications    Details   gabapentin (NEURONTIN) 300 MG capsule Take 1 pill every 8 hours as needed for pain (preferably at night time)  Qty: 60 capsule, Refills: 0    Associated Diagnoses: Empyema of lung (H)      levofloxacin (LEVAQUIN) 750 MG tablet Take 1 tablet (750 mg) by mouth every other day  Qty: 5 tablet, Refills: 0    Associated Diagnoses: Pneumonia due to infectious organism, unspecified laterality, unspecified part of lung; Lung transplant recipient (H)      senna-docusate (SENOKOT-S/PERICOLACE) 8.6-50 MG tablet Take 2 tablets by mouth 2 times daily as needed for constipation  Qty: 60 tablet, Refills: 0     Associated Diagnoses: Constipation, unspecified constipation type         CONTINUE these medications which have CHANGED    Details   ferrous sulfate (FEROSUL) 325 (65 Fe) MG tablet Take 1 tablet (325 mg) by mouth daily (with breakfast)  Qty: 60 tablet, Refills: 2    Associated Diagnoses: S/P lung transplant (H)         CONTINUE these medications which have NOT CHANGED    Details   acetaminophen (TYLENOL) 500 MG tablet Take 1,000 mg by mouth every 6 hours as needed for pain       amLODIPine (NORVASC) 10 MG tablet Take 1 tablet (10 mg) by mouth daily  Qty: 30 tablet, Refills: 11    Associated Diagnoses: Accelerated essential hypertension      calcium-vitamin D (CALTRATE) 600-400 MG-UNIT per tablet Take 1 tablet by mouth daily    Associated Diagnoses: S/P lung transplant (H); ILD (interstitial lung disease) (H); Lung transplant recipient (H); Encounter for long-term (current) use of high-risk medication; Anemia, unspecified type; Cytomegalovirus (CMV) viremia (H)      dronabinol (MARINOL) 5 MG capsule Take 5 mg by mouth 2 times daily (before meals) as needed for nausea      magnesium oxide (MAG-OX) 400 MG tablet Take 800 mg by mouth daily      metoprolol tartrate (LOPRESSOR) 25 MG tablet Take 2 tablets (50 mg) by mouth 2 times daily  Qty: 60 tablet, Refills: 11    Comments: Please remind patient to call pharmacy for refills. Thanks!  Associated Diagnoses: Paroxysmal atrial fibrillation (H)      multivitamin, therapeutic with minerals (THERA-VIT-M) TABS tablet Take 1 tablet by mouth daily  Qty: 30 each, Refills: 11    Associated Diagnoses: Lung transplant recipient (H)      ondansetron (ZOFRAN) 4 MG tablet Take 1 tablet (4 mg) by mouth every 12 hours as needed for nausea  Qty: 60 tablet, Refills: 0    Associated Diagnoses: Intractable vomiting with nausea, unspecified vomiting type      pantoprazole (PROTONIX) 40 MG EC tablet Take 1 tablet (40 mg) by mouth 2 times daily  Qty: 60 tablet, Refills: 11    Associated  Diagnoses: Gastroesophageal reflux disease without esophagitis; ILD (interstitial lung disease) (H); Lung transplant recipient (H)      predniSONE (DELTASONE) 2.5 MG tablet Take two tablets (5mg) every AM and one tablet (2.5mg) every evening  Qty: 90 tablet, Refills: 11    Associated Diagnoses: Lung replaced by transplant (H)      sulfamethoxazole-trimethoprim (BACTRIM/SEPTRA) 400-80 MG tablet Take 1 tablet by mouth daily  Qty: 90 tablet, Refills: 3    Associated Diagnoses: Lung replaced by transplant (H); Need for pneumocystis prophylaxis      tacrolimus (GENERIC EQUIVALENT) 0.5 MG capsule Take 1 capsule (0.5 mg) by mouth every evening Total daily dose of 2mg in AM and 1.5mg in PM  Qty: 90 capsule, Refills: 3    Associated Diagnoses: S/P lung transplant (H)      tacrolimus (GENERIC EQUIVALENT) 1 MG capsule Take two (1mg) caps ever AM and one (1mg) cap every PM. Total dose is 2mg in morning and 1.5mg every evening  Qty: 270 capsule, Refills: 3    Associated Diagnoses: S/P lung transplant (H)      !! order for DME Equipment being ordered: Nebulizer  Qty: 1 Device, Refills: 1    Associated Diagnoses: Status post lung transplantation (H)      !! order for DME Equipment being ordered: Nebulizer  Qty: 1 Device, Refills: 1    Associated Diagnoses: Lung transplant recipient (H)       !! - Potential duplicate medications found. Please discuss with provider.      STOP taking these medications       docusate sodium (COLACE) 50 MG capsule Comments:   Reason for Stopping:                  Consultations:     Consultation during this admission received from:  Pulmonary Medicine   Rheumatology   General Surgery   Gastroenterology              Brief History of Illness:   Kecia Blue is a 58 year old female who has a history of bilateral lung transplant (3/2018) for ILD, anti-synthetase syndrome, dermatomyositis, pulmonary hypertension, chronic cough, seronegative RA and is admitted for abdominal pain.     Ms. Blue has  experienced LUQ abdominal pain for approximately 2-3 months now. The pain is intermittent, sharp, and without radiation. It occurs just below the rib cage. It has no relationship to food intake. She has noted an increase in the severity and frequency of the pain, particularly during the last week. She denies any associated N/V/D. She also denies fevers, chills, cough, dyspnea, or chest pain. She notes that she has had alternating diarrhea and constipation after her lung transplant. Recently over the last few weeks, she has suffered from constipation, but continues to have small bowel movements every one to two days. She denies blood in her stool.     She notes that her chronic cough and breathing are at baseline. She has had no complications recently with regard to her lung transplants. She has not had any rheumatologic flares since receiving her transplants. She does note chronic knee pain, though it is believed to be arthritic (due to cartilage loss from her chronic inflammatory diseases).      ED course:  Vitals significant for: afebrile, HR 80s, /160s/70-100s, sating well on RA  Labs significant for: Cr 1.28 (at baseline), lactate 1.0, CRP 66 (H), ESR (102), lipase 127 (wnl)  Imaging significant for: abdominal US concerning for acute cholecystitis with distention of extrahepatic bile duct to 11 mm concerning for possible choledocholithiasis.         Hospital Course:     #LUQ pain  #Left lower lobe empyema with possible adjacent osteomyelitis   Patient presented with LUQ. Initial imaging suggestive of biliary disease however patient presentation was not consistent with the prior. She had an MRCP (see below) where she was incidintaly found to have left empyema which was confirmed by chest CT.   Attempted Thoracentesis by IR on 9/13 with no success. Pulmonary discussed with primary CVTS surgeon (Dr. Hernandez). Given small size of fluid collection, and high risk procedure we did not pursue surgical  evacuation.  The etiology of the empyema is broad and could bacterial vs atypical bacterial vs fungal. Infectious Diseases were consulted and the plan will be to monitor patient clinically off antibiotics therapy.      -IR thoracentesis attempted , no fluid obtained (9/13)  -Plan to monitor fluid collection clinically   -Pending infectious work up: 1,3 B-D-glucan , aspergillus galactomannan pending , Histo Urine and serum Ag, Blasto serum Ag, Cryptococcus Ag, Histo/Blasto/cocci Abs , AFB blood cultures   -Case discussed with Pulmonary medicine and CVTS. Will not pursue surgical evaluation and monitor    - Follow up with pulmonary medicine on 10/10/2019     #Sputum cultures positive for NLFG-R  Noted during hospital stay. No new infiltrate on chest CT . Other than increased cough no symptoms of PNA. Discussed with pulmonary , given transplant status will treat with levofloxacin for 10 days    - Levofloxacin 750 mg Q48h (based on GFR)  - This therapy is not targeted for left lower lobe fluid collection which we plan to monitor clinically     #Imaging findings concerning for cholecystitis and choledocholithiasis   On admission patient had RUQ US c/w cholecystitis with possible choledocholithiasis (mild dilation of bile duct to 11 mm). General surgery and GI consulted. MRCP was obtained with no choledocholithiasis however confirmed the US findings c/w possible cholecystitis.    This is likely an incidental finding and not the cause of her current LUQ pain. GI and general surgery agreed to no intervention unless clinically change. Plan to address empyema as above and re-evaluate need for elective Lap-gisella.     -No plan for surgical/endoscopic intervention at this point  -Follow up with general surgery once empyema is resolved      #Right knee swelling / pain   #Hisory of Dermatomyositis / seronegative RA  Currently on therapy for RA other than predinsone. Knee MRI showing synovitis, effusion, chronic RA changes and  MCL/LCL tear.      - Orthoconsult referral placed on discharge   - Rheumatology consulted - plan for follow up on 10/9 (discussed starting HCQ)   - Topical NSAIDs  - Prednisone as above      #S/P Hx of bilateral lung transplant 3/2018   - No active issues  *IS:  Tacrolimus 2mg/1.5mg (goal 8-10 , levels 9.5)  Predinsone 5/2.5  MMF on hold 2/2 CMV viremia   *PPX: Bactrim Qday      #Hx of CMV viremia   Off VGCV since 7/25/2019. Most recent level 356 8/26   - CMV undetectable --> repeat levels next week      #Ileus/constipation, resolved  S/p pink lady enema   - Miralax, Senna to have 1 BM qday      Chronic Medical Problems  HTN: cont pta amlodipine and metoprolol  CKD: admission Cr similar to baseline, likely 2/2 CNi            Final Day of Progress before Discharge:     Patient Vitals for the past 12 hrs:   BP Temp Temp src Pulse Resp SpO2   09/15/19 0733 (!) 147/85 98.7  F (37.1  C) Oral 88 16 90 %   09/15/19 0341 (!) 153/87 98.9  F (37.2  C) Oral 73 16 94 %       EXAM:  GEN: NAD, resting comfortably on exam, AAox3  HEENT: NC/AT , well injected conjunctiva, EOMI, PERRL, MMM  Neck: trachea midline, no JVD  CV: RRR, nl S1/S2 no mumurs  PULM: decrease air entry at the left base   Abdomen: soft, non tender, no HSM, no massess,  BS +   EXTREMITIES: warm, trace pedal edema,   SKIN: No rashes,   NEURO: MS: AAOx3 - no focal deficit          Data:  All laboratory data reviewed             Significant Results:       Results for orders placed or performed during the hospital encounter of 09/11/19   US Abdomen Limited (RUQ)    Narrative    EXAMINATION: Limited Abdominal Ultrasound, 9/11/2019 11:50 PM     COMPARISON: CT abdomen 8/20/2018    HISTORY: Transfer from outside hospital for management of possible  acute cholecystitis    FINDINGS:   Fluid: No evidence of ascites or pleural effusions.    Liver: The liver demonstrates normal echotexture, measuring 18.9 cm in  craniocaudal dimension. There is no focal mass.     Gallbladder:  The gallbladder wall is thickened to 4 mm, however is not  significantly hyperemic on color Doppler images. Trace pericholecystic  edema Biliary sludge and cholelithiasis, including stone at the  gallbladder neck.    Bile ducts: The proximal extra hepatic bile duct is dilated to 11 mm,  increased from 8/20/2018. No intrahepatic biliary ductal dilatation.    Pancreas: Obscured by shadowing bowel gas.    Kidney: The right kidney measures 9.0 cm long. There is no  hydronephrosis or hydroureter, no shadowing renal calculi, cystic  lesion or mass.       Impression    IMPRESSION:   1.  In the proper clinical setting, findings concerning for acute  cholecystitis, including gallbladder wall thickening, pericholecystic  edema, and multiple stones at the gallbladder neck.  2.  Distention of the extrahepatic bile ducts to 11 mm, increased from  8/20/2018, possibly from distal choledocholithiasis (the distal ducts  are obscured) or recently passed stone.     I have personally reviewed the examination and initial interpretation  and I agree with the findings.    YONATAN HERNANDES MD   XR Abdomen Port 1 View    Narrative    Examination:  XR ABDOMEN PORT 1 VW    Date:  9/12/2019 11:04 AM     Clinical Information: 57 y/o female with no BM for 2 weeks - evaluate  stool burden     Findings:     Single supine radiograph of the abdomen. Dilation of small and large  bowel with normal caliber ascending and descending colon. Moderate  stool burden. The bowel within the pelvis.Phleboliths in the pelvis.  No acute osseous abnormalities.       Impression    Impression:  1. Dilation of small and large bowel. Suggestive of adynamic ileus.  Nonspecific bowel gas pattern, if further clinical concern consider  CT.  2. Moderate stool burden.     I have personally reviewed the examination and initial interpretation  and I agree with the findings.    MEHNAZ CHAPMAN MD   MR Abdomen MRCP w/o & w Contrast   Result Value Ref Range    Radiologist  flags Possible empyema versus abscess at the left (Urgent)     Narrative    MRI ABDOMEN    CLINICAL HISTORY:  Dilated common bile duct on ultrasound exam.    TECHNIQUE:  Images were acquired with and without intravenous contrast  through the abdomen. The following MR images were acquired: TrueFISP,  multiplanar T2 weighted, axial T1 in/out of phase, axial fat-saturated  T1, diffusion-weighted. Multiplanar T1-weighted images with fat  saturation were obtained before contrast administration and at  multiple time points following the administration of intravenous  contrast. Contrast dose: 7.5ml of GADAVIST    FINDINGS:    Comparison study: Ultrasound abdomen 9/11/2019.    Liver: Normal T1 and T2 signal in the liver. Small cyst in the  posterior hepatic dome. No evidence of signal dropout on out of phase  imaging.    Gallbladder/biliary tree: The gallbladder is dilated to 4.1 cm in  transverse diameter with surrounding pericholecystic fluid. Multiple  dependent stones in the gallbladder. The common bile duct is dilated  at 12 mm.    Spleen: The spleen is the upper limits of normal at 12.8 cm in  craniocaudal dimension.    Kidneys: Bilateral simple renal cysts. No hydronephrosis.    Adrenal glands: Unremarkable    Pancreas: Normal appearance of the pancreas.    Bowel: Normal diameter small bowel and colon. No diverticulitis.    Lymph nodes: No lymphadenopathy by size criteria.    Blood vessels: Patent major abdominal vasculature.    Lung bases: Rim-enhancing fluid-filled structure in the posterior left  hemithorax measuring 4.5 x 3.0 cm (series 22 image 14 and series 9  image 27). Small bilateral pleural effusions.    Bones and soft tissues: Normal bone marrow signal.    Ascites: Small amount of ascites.      Impression    IMPRESSION:   1. Rim-enhancing fluid-filled structure in the posterior left  hemithorax measuring 4.5 x 3.0 cm (series 22 image 14 and series 9  image 27). Findings are concerning for abscess versus  empyema.  Follow-up is recommended using chest CT until resolution.  2. Distended gallbladder with pericholecystic fluid, gallstones, and  dilated common bile duct at 12 mm. No choledocholithiasis or  obstructing mass.    [Urgent Result: Possible empyema versus abscess at the left  costophrenic angle]    Finding was identified on 9/12/2019 5:33 PM.     Thi was contacted by Dr. James at 9/12/2019 5:48 PM and verbalized  understanding of the urgent finding.    I have personally reviewed the examination and initial interpretation  and I agree with the findings.    DEAN PARKER MD   XR Chest 2 Views    Narrative    XR CHEST 2 VW  9/12/2019 12:42 PM      HISTORY: S/P lung transplant    COMPARISON: 8/7/2019, 6/5/2019    FINDINGS: Single view of the chest. Status post bilateral lung  transplants. Trachea is midline, heart size is stable. Slightly  increased mild perihilar interstitial streaky opacities. No pleural  effusion or pneumothorax. No acute osseous or upper abdominal  abnormality.      Impression    IMPRESSION:  1. Status post bilateral lung transplant.  2. Cardiomegaly with slight increase of bilateral perihilar streaky  opacities, favor mild pulmonary edema.    I have personally reviewed the examination and initial interpretation  and I agree with the findings.    ADAM GONZALEZ MD   CT Chest w Contrast    Narrative    EXAMINATION: Chest CT  9/12/2019 6:51 PM    CLINICAL HISTORY: 67-year-old post lung transplant, fluid collection  on MRCP    COMPARISON: MRCP today.    TECHNIQUE: CT imaging obtained through the chest with contrast.  Coronal and axial MIP reformatted images obtained.     FINDINGS:  Postsurgical changes of bilateral lung consolidation. Normal-sized  heart and great vessels. No mediastinal lymphadenopathy.    There is a 4.2 x 2.5 cm rim enhancing fluid collection in the in the  medial left costophrenic angle (series 2 image 40 and sagittal image  46). The collection surrounds the anterior  surface of the the left  10th rib (series 7 image 46) with no fat plane between, with possible  erosion into the bone. Small bilateral pleural effusions.    No suspicious pulmonary nodule. Atelectasis in the posterior lower  lobes.    Bones and soft tissues: Mild periosteal reaction and possible erosions  in the posterior left 10th rib seen best on the sagittal views.    Partially imaged upper abdomen: Cholelithiasis and dilated common bile  duct and gallbladder.      Impression    IMPRESSION:  There is a 4.2 x 2.5 cm empyema in the medial left costophrenic angle,  possibly complicated by left 10th rib osteomyelitis.    I have personally reviewed the examination and initial interpretation  and I agree with the findings.    DEAN PARKER MD   IR Thoracentesis    Narrative    PRE-PROCEDURE DIAGNOSIS: Left pleural effusion    PROCEDURE: IR THORACENTESIS    Impression    IMPRESSION: Failed thoracentesis, unable to obtain fluid for  diagnostic testing using two separate punctures under ultrasound  guidance.     PLAN: Follow-up per primary medicine an infectious disease team's.    ----------    CLINICAL HISTORY: Patient with interstitial lung disease status post  bilateral lung transplant March 20, 2018 admitted with left  abdominal/chest pain found have left pleural effusion concerning for  empyema and possibly adjacent left rib osteomyelitis. IR has been  consulted for diagnostic left thoracentesis and possible chest tube  placement.     COMPARISON: CT chest and MR abdomen 9/12/2019    PERFORMED BY: Kenneth Sparrow PA-C and Case Amaya MD    CONSENT: Written informed consent was obtained and is documented in  the patient record.    MEDICATIONS: 1% lidocaine for local anesthesia      NURSING: Patient continuously monitored by trained, independent  observer.     DESCRIPTION: Targeted ultrasound shows small complex effusion on the  left consistent with recent CT. Patient positioned upright and the  skin overlying the left  pleural effusion was prepped and draped in the  usual sterile fashion.    Under continuous ultrasound visualization, the skin and pleura was  anesthetized before a centesis needle/catheter system was advanced  into the effusion, however no fluid was able to be aspirated. A wire  would not track either so everything was removed. Further ultrasound 1  rib space superior showed effusion and a second centesis needle was  advanced into this collection, however no fluid was aspirated.  Occlusive dressings applied. Primary medicine team updated.     COMPLICATIONS: No immediate concerns, the patient remained stable  throughout the procedure and tolerated it well.    ESTIMATED BLOOD LOSS: Minimal    SPECIMENS: None    URMILA WOODS PA-C   MR Knee Right w/o Contrast    Narrative    MR right knee without contrast 9/15/2019 10:43 AM    Techniques: Multiplanar multisequence imaging of the right knee was  obtained without administration of intra-articular or intravenous  contrast using routing protocol. No contrast was administered due to  relatively low GFR.    History: Knee pain, initial exam; 57 y/o female post transplant with  right knee pain , HX of seronegative RA    Comparison: None available.    Findings:    MENISCI:  Medial meniscus: Marked truncation of the body and posterior horn of  the medial meniscus with tearing of the posterior ligament. No  corresponding size of displaced meniscal flap/fragment is identified;  therefore, meniscus is likely macerated.  Lateral meniscus: Horizontal tear of the body of lateral meniscus.    LIGAMENTS  Cruciate ligaments: Intact.  Medial supporting structures: Edema signal along the tibial collateral  ligament, likely related to altered biomechanics secondary to  underlying meniscal pathology.  Lateral supporting structures: Relatively thin fibular collateral  ligament with intermediate signal, likely due to partial tearing with  altered biomechanics. Severe tendinosis and high-grade  partial tearing  of the popliteus tendon. Biceps femoris tendon and iliotibial band are  intact.    EXTENSOR MECHANISM  Patellar tendinosis. Quadriceps tendon is intact.    FLUID  Large joint effusion with extensive internal debris, likely synovitis  in the provided clinical setting. Small fluid in the popliteal cyst  with internal debris.     OSSEOUS and ARTICULAR STRUCTURES  Bones: No fracture, contusion. Likely erosion posteriorly in the  lateral femoral condyle (image 12 series 3 for instance) and smaller  are medial femoral condyle (image 9 series 5).    Area of subchondral edema in the peripheral subcortical edema may be  related to osteopenia.    Patellofemoral compartment: Mild to moderate diffuse patellar  articular cartilage thinning. Area of full-thickness chondral  fissuring in the trochlea cartilage centrally with subchondral edema.    Medial compartment: Areas of high-grade to full-thickness chondral  loss and thinning with subtle subchondral edema in the medial  compartment.    Lateral compartment: No focal high-grade chondral loss.    ANCILLARY FINDINGS  Nonspecific muscle edema greatest in vastus lateralis. Mild  subcutaneous soft tissue edema.      Impression    Impression:  No contrast was administered due to relatively low GFR.  1. Constellation of findings in keeping with history of rheumatoid  arthritis.    a. Large knee joint effusion with extensive synovitis.   b. Bony erosion in medial and lateral femoral condyles.  2. Partial tear of fibular collateral ligament and high grade partial  tear with severe tendinosis of popliteus.  3. Maceration of medial meniscus.  4. Horizontal tear of lateral meniscus.    JOSSELYN LOPEZ   CBC with platelets differential   Result Value Ref Range    WBC 6.1 4.0 - 11.0 10e9/L    RBC Count 3.28 (L) 3.8 - 5.2 10e12/L    Hemoglobin 9.1 (L) 11.7 - 15.7 g/dL    Hematocrit 30.8 (L) 35.0 - 47.0 %    MCV 94 78 - 100 fl    MCH 27.7 26.5 - 33.0 pg    MCHC 29.5 (L) 31.5  - 36.5 g/dL    RDW 15.5 (H) 10.0 - 15.0 %    Platelet Count 189 150 - 450 10e9/L    Diff Method Manual Differential     % Neutrophils 82.0 %    % Lymphocytes 8.1 %    % Monocytes 9.0 %    % Eosinophils 0.0 %    % Basophils 0.9 %    Absolute Neutrophil 5.0 1.6 - 8.3 10e9/L    Absolute Lymphocytes 0.5 (L) 0.8 - 5.3 10e9/L    Absolute Monocytes 0.5 0.0 - 1.3 10e9/L    Absolute Eosinophils 0.0 0.0 - 0.7 10e9/L    Absolute Basophils 0.1 0.0 - 0.2 10e9/L    Anisocytosis Slight     Platelet Estimate Confirming automated cell count    INR   Result Value Ref Range    INR 1.06 0.86 - 1.14   Comprehensive metabolic panel   Result Value Ref Range    Sodium 133 133 - 144 mmol/L    Potassium 4.2 3.4 - 5.3 mmol/L    Chloride 100 94 - 109 mmol/L    Carbon Dioxide 22 20 - 32 mmol/L    Anion Gap 11 3 - 14 mmol/L    Glucose 76 70 - 99 mg/dL    Urea Nitrogen 24 7 - 30 mg/dL    Creatinine 1.28 (H) 0.52 - 1.04 mg/dL    GFR Estimate 46 (L) >60 mL/min/[1.73_m2]    GFR Estimate If Black 54 (L) >60 mL/min/[1.73_m2]    Calcium 9.2 8.5 - 10.1 mg/dL    Bilirubin Total 0.4 0.2 - 1.3 mg/dL    Albumin 2.8 (L) 3.4 - 5.0 g/dL    Protein Total 8.3 6.8 - 8.8 g/dL    Alkaline Phosphatase 255 (H) 40 - 150 U/L    ALT 17 0 - 50 U/L    AST 29 0 - 45 U/L   Lactic acid whole blood   Result Value Ref Range    Lactic Acid 1.0 0.7 - 2.0 mmol/L   CRP inflammation   Result Value Ref Range    CRP Inflammation 66.0 (H) 0.0 - 8.0 mg/L   Erythrocyte sedimentation rate auto   Result Value Ref Range    Sed Rate 102 (H) 0 - 30 mm/h   Lipase   Result Value Ref Range    Lipase 127 73 - 393 U/L   UA with Microscopic   Result Value Ref Range    Color Urine Light Yellow     Appearance Urine Clear     Glucose Urine Negative NEG^Negative mg/dL    Bilirubin Urine Negative NEG^Negative    Ketones Urine 10 (A) NEG^Negative mg/dL    Specific Gravity Urine 1.008 1.003 - 1.035    Blood Urine Negative NEG^Negative    pH Urine 7.5 (H) 5.0 - 7.0 pH    Protein Albumin Urine 30 (A)  NEG^Negative mg/dL    Urobilinogen mg/dL Normal 0.0 - 2.0 mg/dL    Nitrite Urine Negative NEG^Negative    Leukocyte Esterase Urine Negative NEG^Negative    Source Clean catch urine     WBC Urine 3 0 - 5 /HPF    RBC Urine <1 0 - 2 /HPF    Mucous Urine Present (A) NEG^Negative /LPF   CMV DNA quantification   Result Value Ref Range    CMV DNA Quantitation Specimen EDTA PLASMA     CMV Quant IU/mL <137 (A) CMVND^CMV DNA Not Detected [IU]/mL    Log IU/mL of CMVQNT <2.1 <2.1 [Log_IU]/mL   Comprehensive metabolic panel   Result Value Ref Range    Sodium 134 133 - 144 mmol/L    Potassium 4.5 3.4 - 5.3 mmol/L    Chloride 105 94 - 109 mmol/L    Carbon Dioxide 22 20 - 32 mmol/L    Anion Gap 8 3 - 14 mmol/L    Glucose 120 (H) 70 - 99 mg/dL    Urea Nitrogen 31 (H) 7 - 30 mg/dL    Creatinine 1.51 (H) 0.52 - 1.04 mg/dL    GFR Estimate 38 (L) >60 mL/min/[1.73_m2]    GFR Estimate If Black 44 (L) >60 mL/min/[1.73_m2]    Calcium 8.5 8.5 - 10.1 mg/dL    Bilirubin Total 0.3 0.2 - 1.3 mg/dL    Albumin 2.3 (L) 3.4 - 5.0 g/dL    Protein Total 7.0 6.8 - 8.8 g/dL    Alkaline Phosphatase 212 (H) 40 - 150 U/L    ALT 14 0 - 50 U/L    AST 21 0 - 45 U/L   CBC with platelets   Result Value Ref Range    WBC 5.0 4.0 - 11.0 10e9/L    RBC Count 2.91 (L) 3.8 - 5.2 10e12/L    Hemoglobin 8.0 (L) 11.7 - 15.7 g/dL    Hematocrit 26.8 (L) 35.0 - 47.0 %    MCV 92 78 - 100 fl    MCH 27.5 26.5 - 33.0 pg    MCHC 29.9 (L) 31.5 - 36.5 g/dL    RDW 15.6 (H) 10.0 - 15.0 %    Platelet Count 186 150 - 450 10e9/L   Tacrolimus level   Result Value Ref Range    Tacrolimus Last Dose Not Provided     Tacrolimus Level 9.5 5.0 - 15.0 ug/L   Erythrocyte sedimentation rate auto   Result Value Ref Range    Sed Rate 111 (H) 0 - 30 mm/h   CRP inflammation   Result Value Ref Range    CRP Inflammation 58.0 (H) 0.0 - 8.0 mg/L   Lactate Dehydrogenase   Result Value Ref Range    Lactate Dehydrogenase 258 (H) 81 - 234 U/L   CBC with platelets   Result Value Ref Range    WBC 4.6 4.0 -  11.0 10e9/L    RBC Count 2.73 (L) 3.8 - 5.2 10e12/L    Hemoglobin 7.5 (L) 11.7 - 15.7 g/dL    Hematocrit 25.2 (L) 35.0 - 47.0 %    MCV 92 78 - 100 fl    MCH 27.5 26.5 - 33.0 pg    MCHC 29.8 (L) 31.5 - 36.5 g/dL    RDW 15.8 (H) 10.0 - 15.0 %    Platelet Count 167 150 - 450 10e9/L   Comprehensive metabolic panel   Result Value Ref Range    Sodium 133 133 - 144 mmol/L    Potassium 4.6 3.4 - 5.3 mmol/L    Chloride 103 94 - 109 mmol/L    Carbon Dioxide 22 20 - 32 mmol/L    Anion Gap 8 3 - 14 mmol/L    Glucose 119 (H) 70 - 99 mg/dL    Urea Nitrogen 32 (H) 7 - 30 mg/dL    Creatinine 1.79 (H) 0.52 - 1.04 mg/dL    GFR Estimate 31 (L) >60 mL/min/[1.73_m2]    GFR Estimate If Black 36 (L) >60 mL/min/[1.73_m2]    Calcium 8.5 8.5 - 10.1 mg/dL    Bilirubin Total 0.2 0.2 - 1.3 mg/dL    Albumin 2.2 (L) 3.4 - 5.0 g/dL    Protein Total 6.7 (L) 6.8 - 8.8 g/dL    Alkaline Phosphatase 220 (H) 40 - 150 U/L    ALT 14 0 - 50 U/L    AST 20 0 - 45 U/L   Procalcitonin   Result Value Ref Range    Procalcitonin 0.23 ng/ml   Cryptococcus antigen   Result Value Ref Range    Specimen Description Serum     Cryptococcal Antigen Test Negative for cryptococcal antigen    Comprehensive metabolic panel   Result Value Ref Range    Sodium 136 133 - 144 mmol/L    Potassium 4.2 3.4 - 5.3 mmol/L    Chloride 104 94 - 109 mmol/L    Carbon Dioxide 22 20 - 32 mmol/L    Anion Gap 11 3 - 14 mmol/L    Glucose 95 70 - 99 mg/dL    Urea Nitrogen 25 7 - 30 mg/dL    Creatinine 1.60 (H) 0.52 - 1.04 mg/dL    GFR Estimate 35 (L) >60 mL/min/[1.73_m2]    GFR Estimate If Black 41 (L) >60 mL/min/[1.73_m2]    Calcium 8.6 8.5 - 10.1 mg/dL    Bilirubin Total 0.7 0.2 - 1.3 mg/dL    Albumin 2.2 (L) 3.4 - 5.0 g/dL    Protein Total 7.0 6.8 - 8.8 g/dL    Alkaline Phosphatase 228 (H) 40 - 150 U/L    ALT 19 0 - 50 U/L    AST 27 0 - 45 U/L   GI Pancreaticobiliary Adult IP Consult: Patient to be seen: Routine within 24 hrs; Call back #: Gold team, see sticky note; Rule out  choledocholithiasis, possible ERCP; Consultant may enter orders: Yes; Requesting provider? Hospitalist (if differe...    Narrative    Alex Fry MD     9/12/2019  1:51 PM      GASTROENTEROLOGY CONSULTATION      Date of Admission:  9/11/2019           Reason for Consultation:   We were asked by Dr. Shaw of [facility] to evaluate this   patient with possible choledocholithiasis           ASSESSMENT AND RECOMMENDATIONS:   Assessment:  58 year old female with a history of acute exacerbation of LUQ   abdominal pain that she has been experiencing for 2 months. Pt   has a PMH significant for bilateral lung transplant for ILD in   3/18. Imaging has been suggestive of possible acute cholecystitis   and possible choledocholithiasis with CBD dilation.                   # LUQ Abdominal Pain  # Constipation  # Possible acute cholecystitis with possible choledocholithiasis  LUQ pain not correlating with imaging findings of GB issues.   Suspect LUQ pain could be secondary to constipation. No bilirubin   elevation and normal transaminases with mild elevation of alk   phos makes obstruction less likely although CBD dilation to 11mm   makes choledocholithiasis a possibility. This could represent a   passed stone as well. Patient currently asymptomatic on   antibiotics. Suspect given co-morbidities we will evaluate with   MRCP for further signs of obstruction. If positive for   choledocholithiasis ERCP will be indicated for biliary drainage   and trans-cystic duct stent for GB drainage as well.                               Recommendations  --Please obtain MRI/MRCP to evaluate for further signs of biliary   obstruction   --Please start bowel regimen with miralax and senna   --Trend LFTs  --Continue antibiotics   --We will consider ERCP after evaluating MRCP   --Surgery following       Thank you for involving us in this patient's care. Please do not   hesitate to contact the GI service with any questions or    concerns.     Pt care plan discussed with Dr. Sheppard, GI staff physician.    Alex Sharma MD  GI Fellow   [phone]  ------------------------------------------------------------------  -------------------------------------------------           History of Present Illness:   Kecia Blue is a 58 year old female with a history of acute   exacerbation of LUQ abdominal pain that she has been experiencing   for 2 months. Pt has a PMH significant for bilateral lung   transplant for ILD in 3/18. Imaging has been suggestive of   possible acute cholecystitis and possible choledocholithiasis   with CBD dilation. Patient reports pain is mainly in LUQ   localized, dull with no exacerbations after eating. No nausea or   vomiting. She denies any fevers or chills. She also reports no   signs of overt bleeding. Went to OSH and CT showed possible acute   cholecystitis with CBD of 11mm. Patient came to [facility] RUQ showed   signs of acute cholecystitis with extrahepatic duct dilation as   well. LFTs normal aside from elevated alk phos to 255. No WBC   count elevation noted.            Past Medical History:   Reviewed and edited as appropriate  Past Medical History:   Diagnosis Date     Antisynthetase syndrome (H) 2014     Chronic cough      Chronic infection     recent C diff  8/18     Dehydration 8/1/2018     Dermatomyositis (H)      Dysplasia of cervix, low grade (ESTRADA 1)      ILD (interstitial lung disease) (H)      Osteopenia      PONV (postoperative nausea and vomiting)      Pulmonary hypertension (H)      Raynaud's disease      Seronegative rheumatoid arthritis (H)             Past Surgical History:   Reviewed and edited as appropriate   Past Surgical History:   Procedure Laterality Date     BRONCHOSCOPY (RIGID OR FLEXIBLE), DIAGNOSTIC N/A 4/10/2018    Procedure: COMBINED BRONCHOSCOPY (RIGID OR FLEXIBLE), LAVAGE;;    Surgeon: Mariposa Donohue MD;  Location:  GI     BRONCHOSCOPY (RIGID OR FLEXIBLE), DILATE  BRONCHUS / TRACHEA N/A   10/11/2018    Procedure: BRONCHOSCOPY (RIGID OR FLEXIBLE), DILATE BRONCHUS /   TRACHEA;  Flexible And Rigid Bronchoscopy and Dilation;  Surgeon:   Wilber Lin MD;  [facility]: [facility]     BRONCHOSCOPY FLEXIBLE N/A 3/13/2018    Procedure: BRONCHOSCOPY FLEXIBLE;  Flexible Bronchoscopy ;    Surgeon: Gissell Sanchez MD;  Location: [facility] GI     BRONCHOSCOPY FLEXIBLE N/A 5/9/2018    Procedure: BRONCHOSCOPY FLEXIBLE;;  Surgeon: Wilber Lin MD;  [facility]: [facility] GI     BRONCHOSCOPY FLEXIBLE AND RIGID N/A 9/10/2018    Procedure: BRONCHOSCOPY FLEXIBLE AND RIGID;  Flexible and Rigid   Bronchoscopy with Balloon Dilation, tissue debulking with cryo,   and Right mainstem bronchus stent placement;  Surgeon: Wilber Lin MD;  [facility]     BRONCHOSCOPY RIGID N/A 6/6/2018    Procedure: BRONCHOSCOPY RIGID;;  Surgeon: Lopez Macias MD;  [facility]: [facility]     BRONCHOSCOPY, DILATE BRONCHUS, STENT BRONCHUS, COMBINED N/A   6/11/2018    Procedure: COMBINED BRONCHOSCOPY, DILATE BRONCHUS, STENT   BRONCHUS;  Flexible Bronchoscopy, Balloon Dilation, Bronchial   Washings;  Surgeon: Wilber Lin MD;  [facility]     BRONCHOSCOPY, DILATE BRONCHUS, STENT BRONCHUS, COMBINED Right   7/10/2018    Procedure: COMBINED BRONCHOSCOPY, DILATE BRONCHUS, STENT   BRONCHUS;  Flexible Bronchoscopy, Balloon Dilation, Bronchial   Washings  ;  Surgeon: Wilber Lin MD;  [facility]     BRONCHOSCOPY, DILATE BRONCHUS, STENT BRONCHUS, COMBINED N/A   8/2/2018    Procedure: COMBINED BRONCHOSCOPY, DILATE BRONCHUS, STENT   BRONCHUS;  Flexible Bronchoscopy, Bronchial Washings, Balloon   Dilation;  Surgeon: Wilber Lin MD;  [facility]     BRONCHOSCOPY, DILATE BRONCHUS, STENT BRONCHUS, COMBINED N/A   8/20/2018    Procedure: COMBINED BRONCHOSCOPY, DILATE BRONCHUS, STENT   BRONCHUS;  Flexible Bronchoscopy, Balloon Dilation;  Surgeon:   Wilber Lin MD;   [facility]     BRONCHOSCOPY, DILATE BRONCHUS, STENT BRONCHUS, COMBINED N/A   10/29/2018    Procedure: Flexible Bronchoscopy, Balloon Dilation, Stent   Revision, Airway Examination And Therapeutic Suctioning, Cyro   Tumor Debulking;  Surgeon: Wilber Lin MD;  [facility]     BRONCHOSCOPY, DILATE BRONCHUS, STENT BRONCHUS, COMBINED N/A   11/20/2018    Procedure: Rigid Bronchoscopy, Stent Removal and dilitation;    Surgeon: Wilber Lin MD;  [facility]     BRONCHOSCOPY, DILATE BRONCHUS, STENT BRONCHUS, COMBINED N/A   12/14/2018    Procedure: Flexible And Rigid Bronchoscopy, Balloon Dilation,   Bronchial Washing;  Surgeon: Wilber Lin MD;    [facility]     BRONCHOSCOPY, DILATE BRONCHUS, STENT BRONCHUS, COMBINED N/A   1/17/2019    Procedure: Flexible And Rigid Bronchoscopy, Balloon Dilation.;    Surgeon: Wilber Lin MD;  [facility]     BRONCHOSCOPY, DILATE BRONCHUS, STENT BRONCHUS, COMBINED N/A   3/7/2019    Procedure: Flexible and Rigid Bronchoscopy, Bronchial Washing,   Balloon Dilation;  Surgeon: Wilber Lin MD;  [facility]     BRONCHOSCOPY, DILATE BRONCHUS, STENT BRONCHUS, COMBINED N/A   6/6/2019    Procedure: Rigid and Flexible Bronchoscopy, Balloon Dilation;    Surgeon: Wilber Lin MD;  [facility]     ENT SURGERY      tonsillectomy as a child     ESOPHAGOSCOPY, GASTROSCOPY, DUODENOSCOPY (EGD), COMBINED N/A   10/29/2018    Procedure: COMBINED ESOPHAGOSCOPY, GASTROSCOPY, DUODENOSCOPY   (EGD) with biopsies and polypectomy;  Surgeon: Chente Bloom MD;  [facility]     INSERT EXTRACORPORAL MEMBRANE OXYGENATOR ADULT (OUTSIDE OR) N/A   2/27/2018    Procedure: INSERT EXTRACORPORAL MEMBRANE OXYGENATOR ADULT   (OUTSIDE OR);  INSERT EXTRACORPORAL MEMBRANE OXYGENATOR ADULT   (OUTSIDE OR) ;  Surgeon: Toby Hernandez MD;    Location: [facility]     no prior surgery       REMOVE EXTRACORPORAL MEMBRANE  OXYGENATOR ADULT N/A 3/3/2018    Procedure: REMOVE EXTRACORPORAL MEMBRANE OXYGENATOR ADULT;    Removal of Right Femoral Venous and Right Axillary Arterial   Extracorporeal Membrane Oxygenator;  Surgeon: Toby Hernandez MD;  [facility]     TRANSPLANT LUNG RECIPIENT SINGLE X2 Bilateral 3/1/2018    Procedure: TRANSPLANT LUNG RECIPIENT SINGLE X2;  Median   Sternotomy, Extracorporeal Membrane Oxygenator, bilateral   sequential lung transplant;  Surgeon: Toby Hernandez MD;  Location: [facility]            Previous Endoscopy:   No results found for this or any previous visit.         Social History:   Reviewed and edited as appropriate  Social History     Socioeconomic History     Marital status:      Spouse name: Not on file     Number of children: Not on file     Years of education: Not on file     Highest education level: Not on file   Occupational History     Not on file   Social Needs     Financial resource strain: Not on file     Food insecurity:     Worry: Not on file     Inability: Not on file     Transportation needs:     Medical: Not on file     Non-medical: Not on file   Tobacco Use     Smoking status: Never Smoker     Smokeless tobacco: Never Used   Substance and Sexual Activity     Alcohol use: No     Alcohol/week: 0.6 oz     Types: 1 Glasses of wine per week     Drug use: No     Sexual activity: Not on file   Lifestyle     Physical activity:     Days per week: Not on file     Minutes per session: Not on file     Stress: Not on file   Relationships     Social connections:     Talks on phone: Not on file     Gets together: Not on file     Attends Voodoo service: Not on file     Active member of club or organization: Not on file     Attends meetings of clubs or organizations: Not on file     Relationship status: Not on file     Intimate partner violence:     Fear of current or ex partner: Not on file     Emotionally abused: Not on file     Physically abused: Not on file      Forced sexual activity: Not on file   Other Topics Concern     Parent/sibling w/ CABG, MI or angioplasty before 65F 55M? No   Social History Narrative    3/6/2019 - Lives with . Has three daughters. Four   grandchildren (two ). No pets. Travelled previously to   [place], [place]. Has visited [place] several times.             Family History:   Reviewed and edited as appropriate  Family History   Problem Relation Age of Onset     Hypertension Mother      Arthritis Mother      Pancreatic Cancer Father      Diabetes Father       No known history of colorectal cancer, liver disease, or   inflammatory bowel disease.         Allergies:   Reviewed and edited as appropriate   No Known Allergies         Medications:     Current Facility-Administered Medications   Medication     acetaminophen (TYLENOL) tablet 650 mg     amLODIPine (NORVASC) tablet 10 mg     calcium carbonate 600 mg-vitamin D 400 units (CALTRATE) per   tablet 1 tablet     docusate sodium (COLACE) capsule 100 mg     ferrous sulfate (FEROSUL) tablet 325 mg     lidocaine (LMX4) cream     lidocaine 1 % 0.1-1 mL     magnesium hydroxide (MILK OF MAGNESIA) suspension 30 mL     magnesium oxide (MAG-OX) tablet 400 mg     melatonin tablet 1 mg     metoprolol tartrate (LOPRESSOR) tablet 50 mg     multivitamin w/minerals (THERA-VIT-M) tablet 1 tablet     naloxone (NARCAN) injection 0.1-0.4 mg     ondansetron (ZOFRAN-ODT) ODT tab 4 mg    Or     ondansetron (ZOFRAN) injection 4 mg     oxyCODONE (ROXICODONE) tablet 5 mg     pantoprazole (PROTONIX) EC tablet 40 mg     pink lady enema (COMPOUNDED: docusate, magnesium citrate,   mineral oil, sodium phosphate)     piperacillin-tazobactam (ZOSYN) 3.375 g vial to attach to NS   100 mL bag     polyethylene glycol (MIRALAX/GLYCOLAX) Packet 17 g     predniSONE (DELTASONE) tablet 2.5 mg     predniSONE (DELTASONE) tablet 5 mg     prochlorperazine (COMPAZINE) injection 10 mg    Or     prochlorperazine (COMPAZINE) tablet 10  "mg    Or     prochlorperazine (COMPAZINE) Suppository 25 mg     senna-docusate (SENOKOT-S/PERICOLACE) 8.6-50 MG per tablet 1   tablet     sodium chloride (PF) 0.9% PF flush 3 mL     sodium chloride (PF) 0.9% PF flush 3 mL     sulfamethoxazole-trimethoprim (BACTRIM/SEPTRA) 400-80 MG per   tablet 1 tablet     tacrolimus (GENERIC EQUIVALENT) capsule 1.5 mg     tacrolimus (GENERIC EQUIVALENT) capsule 2 mg             Review of Systems:     A complete 10 point review of systems was performed and is   negative except as noted in the HPI           Physical Exam:   /75 (BP Location: Left arm)   Pulse 72   Temp 98  F (36.7    C) (Oral)   Resp 18   Ht 1.626 m (5' 4\")   Wt 55.1 kg (121 lb   8 oz)   LMP 06/07/2014 (Exact Date)   SpO2 97%   BMI 20.86   kg/m    Wt:   Wt Readings from Last 2 Encounters:   09/12/19 55.1 kg (121 lb 8 oz)   08/07/19 54 kg (119 lb)      Constitutional: cooperative, pleasant, not dyspneic/diaphoretic,   no acute distress  Eyes: Sclera anicteric/injected  CV: No edema  Respiratory: Harsh cough with labored breathing  Abd:  Mildly distended, no hepatosplenomegaly, nontender, no   peritoneal signs  Skin: warm, perfused, no jaundice  Neuro: AAO x 3,  Psych: Normal affect  MSK: No gross deformities         Data:   Labs and imaging below were independently reviewed and   interpreted    BMP  Recent Labs   Lab 09/11/19  2325      POTASSIUM 4.2   CHLORIDE 100   CHELSEY 9.2   CO2 22   BUN 24   CR 1.28*   GLC 76     CBC  Recent Labs   Lab 09/11/19 2325   WBC 6.1   RBC 3.28*   HGB 9.1*   HCT 30.8*   MCV 94   MCH 27.7   MCHC 29.5*   RDW 15.5*        INR  Recent Labs   Lab 09/11/19  2325   INR 1.06     LFTs  Recent Labs   Lab 09/11/19 2325   ALKPHOS 255*   AST 29   ALT 17   BILITOTAL 0.4   PROTTOTAL 8.3   ALBUMIN 2.8*      PANC  Recent Labs   Lab 09/11/19  2325   LIPASE 127       Imaging:   RUQ US 9/11/19  IMPRESSION:   1.  In the proper clinical setting, findings concerning for " acute  cholecystitis, including gallbladder wall thickening,   pericholecystic  edema, and multiple stones at the gallbladder neck.  2.  Distention of the extrahepatic bile ducts to 11 mm, increased   from  2018, possibly from distal choledocholithiasis (the distal   ducts  are obscured) or recently passed stone.    Pulmonary CF/Transplant Adult IP Consult: Patient to be seen: Routine within 24 hrs; Call back #: Gold team, see sticky note; Routine follow of lung transplant patient with likely cholecystitis; Consultant may enter orders: Yes; Requesting provide...    Narrative    Juana Ibrara NP     2019  6:38 PM      Pulmonary Medicine  Cystic Fibrosis - Lung Transplant Team  Initial Consultation  2019       Patient: Kecia Blue  MRN: [id number]  : 1960 (age 58 year old)  Transplant: 3/1/2018 (Lung), POD#[id number]  Admission date: 2019  Consulting provider: Dom Orellana MD  Reason for consultation: Lung transplant immunosuppression    Assessment & Plan:     Kecia Blue is a 58 year old female who is S/P bilateral   lung transplant 3/1/18 for ILD with Pulm HTN. Post operative   course complicated by right main bronchial stenosis, positive   DSAs, CMV viremia, leukopenia, C difficile and CMV viremia. Other   history includes chronic cough, dermatomyositis, and seronegative   RA. The patient was admitted on 2019 from OSH for surgical   evaluation of abdominal pain, cholelithiasis, colicystitis, and   CBD dilation. We are consulted for immunosuppression management.    S/P bilateral lung transplant for ILD:  Last seen in pulm clinic    by Dr. Mcelroy. Baseline mildly productive cough (clear   sputum). Most recent PFTs with FEV1 57% (). Upon admission   patient denies dyspnea, no hypoxia, but baseline cough is with   increased sputum production that has changed to yellow in color.   No fever, aches chills, or leukocytosis. Exam is with right sided    crackles and bronchial breath sounds.  - CXR ordered to evaluate for infection vs pulmonary edema.  - Sputum culture and sputum fungal culture ordered  - Blood cultures (9/11) pending    Immunosuppression:  - Tacrolimus 2 mg qAM/ 1.5 mg qPM.  Goal level 8-10 (d/t CKD).    Next tacro level tomorrow 9/13 (ordered).  - MMF on HOLD since 11/5/18 d/t CMV viremia and leukopenia. Per   Dr. Mcelroy, plan is to restart when CMV remains negative.  - Prednisone 5 mg qAM/ 2.5 qPM    Prophylaxis:   - Bactrim daily    Right main bronchial stenosis s/p dilatation: Follow with Dr. Lin for serial bronchoscopies with dilation. Last bronch 6/6   with dilation to BI.    - Next bronchoscopy is scheduled for 10/11 with Dr. 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 CMV PCR has been <100   since 2/21/19, but now again elevated to 662 (8/20), most   recently 356 (8/26) CMV has been followed Q2 week as OP.   - CMV DNA ordered (9/12)     H/o Leukopenia: Improved since 8/7, suspect d/t discontinuation   of Valcyte (as above). WBC 6.1 (9/12)  - CBC daily    We appreciate the excellent care provided by the [facility] team.   Recommendations communicated via in person rounding and this   note. Will continue to follow along closely, please do not   hesitate to call with any questions or concerns.    Patient discussed with Dr. Fermin Ibarra, APRN, CNP   Inpatient Nurse Practitioner  Pulmonary CF/Transplant  Pager [phone]     Chief Complaint:     Abdominal pain, cholelithiasis    History of Present Illness:     History obtained from patient.    Kecia Blue is a 58 year old female who is S/P bilateral   lung transplant 3/1/18 for ILD with Pulm HTN. Post operative   course complicated by right main bronchial stenosis, positive   DSAs, CMV viremia, leukopenia, C difficile and CMV viremia. Other   history includes chronic cough, dermatomyositis, and seronegative   RA.     Presents with LUQ abdominal pain and CT imaging  from OSH   indicative of cholelithiasis, colicystitis, and CBD dilation. She   is now admitted for surgical evaluation.   No acute events overnight. Denies fever, aches, chills. Remains   hemodynamically stable, no fever. Today she reports her chronic   cough which is normally with clear sputum has changed to thick   yellow. Otherwise, breathing is comfortable with no dypnea or   hypoxia. Ongoing LUQ abdominal pain, though no abdominal   tenderness. Denies nausea or vomiting. Good appetite, though   reports early fullness. She has not had formed BM in 2 weeks,   they have been loose and small. Has been taking miralax daily.   Denies dysuria. Ongoing bilateral arthralgia d/t chronic   dermatomyositis.    Review of Systems:     Complete ROS negative except as noted above.    Medical and Surgical History:     Past Medical History:   Diagnosis Date     Antisynthetase syndrome (H) 2014     Chronic cough      Chronic infection     recent C diff  8/18     Dehydration 8/1/2018     Dermatomyositis (H)      Dysplasia of cervix, low grade (ESTRADA 1)      ILD (interstitial lung disease) (H)      Osteopenia      PONV (postoperative nausea and vomiting)      Pulmonary hypertension (H)      Raynaud's disease      Seronegative rheumatoid arthritis (H)      Past Surgical History:   Procedure Laterality Date     BRONCHOSCOPY (RIGID OR FLEXIBLE), DIAGNOSTIC N/A 4/10/2018    Procedure: COMBINED BRONCHOSCOPY (RIGID OR FLEXIBLE), LAVAGE;;    Surgeon: Mariposa Donohue MD;  Location:  GI     BRONCHOSCOPY (RIGID OR FLEXIBLE), DILATE BRONCHUS / TRACHEA N/A   10/11/2018    Procedure: BRONCHOSCOPY (RIGID OR FLEXIBLE), DILATE BRONCHUS /   TRACHEA;  Flexible And Rigid Bronchoscopy and Dilation;  Surgeon:   Wilber Lin MD;  [facility]     BRONCHOSCOPY FLEXIBLE N/A 3/13/2018    Procedure: BRONCHOSCOPY FLEXIBLE;  Flexible Bronchoscopy ;    Surgeon: Gissell Sanchez MD;  [facility]     BRONCHOSCOPY FLEXIBLE N/A 5/9/2018     Procedure: BRONCHOSCOPY FLEXIBLE;;  Surgeon: Wilber Lin MD;  [facility]     BRONCHOSCOPY FLEXIBLE AND RIGID N/A 9/10/2018    Procedure: BRONCHOSCOPY FLEXIBLE AND RIGID;  Flexible and Rigid   Bronchoscopy with Balloon Dilation, tissue debulking with cryo,   and Right mainstem bronchus stent placement;  Surgeon: Wilber Lin MD;  [facility]     BRONCHOSCOPY RIGID N/A 6/6/2018    Procedure: BRONCHOSCOPY RIGID;;  Surgeon: Lopez Macias MD;  [facility]     BRONCHOSCOPY, DILATE BRONCHUS, STENT BRONCHUS, COMBINED N/A   6/11/2018    Procedure: COMBINED BRONCHOSCOPY, DILATE BRONCHUS, STENT   BRONCHUS;  Flexible Bronchoscopy, Balloon Dilation, Bronchial   Washings;  Surgeon: Wilber Lin MD;  [facility]     BRONCHOSCOPY, DILATE BRONCHUS, STENT BRONCHUS, COMBINED Right   7/10/2018    Procedure: COMBINED BRONCHOSCOPY, DILATE BRONCHUS, STENT   BRONCHUS;  Flexible Bronchoscopy, Balloon Dilation, Bronchial   Washings  ;  Surgeon: Wilber Lin MD;  [facility]     BRONCHOSCOPY, DILATE BRONCHUS, STENT BRONCHUS, COMBINED N/A   8/2/2018    Procedure: COMBINED BRONCHOSCOPY, DILATE BRONCHUS, STENT   BRONCHUS;  Flexible Bronchoscopy, Bronchial Washings, Balloon   Dilation;  Surgeon: Wilber Lin MD;  [facility]     BRONCHOSCOPY, DILATE BRONCHUS, STENT BRONCHUS, COMBINED N/A   8/20/2018    Procedure: COMBINED BRONCHOSCOPY, DILATE BRONCHUS, STENT   BRONCHUS;  Flexible Bronchoscopy, Balloon Dilation;  Surgeon:   Wilber Lin MD;  [facility]     BRONCHOSCOPY, DILATE BRONCHUS, STENT BRONCHUS, COMBINED N/A   10/29/2018    Procedure: Flexible Bronchoscopy, Balloon Dilation, Stent   Revision, Airway Examination And Therapeutic Suctioning, Cyro   Tumor Debulking;  Surgeon: Wilber Lin MD;  [facility]     BRONCHOSCOPY, DILATE BRONCHUS, STENT BRONCHUS, COMBINED N/A   11/20/2018    Procedure: Rigid Bronchoscopy, Stent  Removal and dilitation;    Surgeon: Wilber Lin MD;  [facility]     BRONCHOSCOPY, DILATE BRONCHUS, STENT BRONCHUS, COMBINED N/A   12/14/2018    Procedure: Flexible And Rigid Bronchoscopy, Balloon Dilation,   Bronchial Washing;  Surgeon: Wilber Lin MD;    [facility]     BRONCHOSCOPY, DILATE BRONCHUS, STENT BRONCHUS, COMBINED N/A   1/17/2019    Procedure: Flexible And Rigid Bronchoscopy, Balloon Dilation.;    Surgeon: Wilber Lin MD;  [facility]     BRONCHOSCOPY, DILATE BRONCHUS, STENT BRONCHUS, COMBINED N/A   3/7/2019    Procedure: Flexible and Rigid Bronchoscopy, Bronchial Washing,   Balloon Dilation;  Surgeon: Wilber Lin MD;  [facility]     BRONCHOSCOPY, DILATE BRONCHUS, STENT BRONCHUS, COMBINED N/A   6/6/2019    Procedure: Rigid and Flexible Bronchoscopy, Balloon Dilation;    Surgeon: Wilber Lin MD;  [facility]     ENT SURGERY      tonsillectomy as a child     ESOPHAGOSCOPY, GASTROSCOPY, DUODENOSCOPY (EGD), COMBINED N/A   10/29/2018    Procedure: COMBINED ESOPHAGOSCOPY, GASTROSCOPY, DUODENOSCOPY   (EGD) with biopsies and polypectomy;  Surgeon: Chente Bloom MD;  [facility]     INSERT EXTRACORPORAL MEMBRANE OXYGENATOR ADULT (OUTSIDE OR) N/A   2/27/2018    Procedure: INSERT EXTRACORPORAL MEMBRANE OXYGENATOR ADULT   (OUTSIDE OR);  INSERT EXTRACORPORAL MEMBRANE OXYGENATOR ADULT   (OUTSIDE OR) ;  Surgeon: Toby Hernandez MD;    [facility]     no prior surgery       REMOVE EXTRACORPORAL MEMBRANE OXYGENATOR ADULT N/A 3/3/2018    Procedure: REMOVE EXTRACORPORAL MEMBRANE OXYGENATOR ADULT;    Removal of Right Femoral Venous and Right Axillary Arterial   Extracorporeal Membrane Oxygenator;  Surgeon: Toby Hernandez MD;  [facility]     TRANSPLANT LUNG RECIPIENT SINGLE X2 Bilateral 3/1/2018    Procedure: TRANSPLANT LUNG RECIPIENT SINGLE X2;  Median   Sternotomy, Extracorporeal Membrane  Oxygenator, bilateral   sequential lung transplant;  Surgeon: Toby Hernandez MD;  Location:  OR     Social and Family History:     Social History     Socioeconomic History     Marital status:      Spouse name: Not on file     Number of children: Not on file     Years of education: Not on file     Highest education level: Not on file   Occupational History     Not on file   Social Needs     Financial resource strain: Not on file     Food insecurity:     Worry: Not on file     Inability: Not on file     Transportation needs:     Medical: Not on file     Non-medical: Not on file   Tobacco Use     Smoking status: Never Smoker     Smokeless tobacco: Never Used   Substance and Sexual Activity     Alcohol use: No     Alcohol/week: 0.6 oz     Types: 1 Glasses of wine per week     Drug use: No     Sexual activity: Not on file   Lifestyle     Physical activity:     Days per week: Not on file     Minutes per session: Not on file     Stress: Not on file   Relationships     Social connections:     Talks on phone: Not on file     Gets together: Not on file     Attends Sikhism service: Not on file     Active member of club or organization: Not on file     Attends meetings of clubs or organizations: Not on file     Relationship status: Not on file     Intimate partner violence:     Fear of current or ex partner: Not on file     Emotionally abused: Not on file     Physically abused: Not on file     Forced sexual activity: Not on file   Other Topics Concern     Parent/sibling w/ CABG, MI or angioplasty before 65F 55M? No   Social History Narrative    3/6/2019 - Lives with . Has three daughters. Four   grandchildren (two ). No pets. Travelled previously to   [facility]. Has visited [place] several times.      Family History   Problem Relation Age of Onset     Hypertension Mother      Arthritis Mother      Pancreatic Cancer Father      Diabetes Father      Allergies and Home Medications:    No Known Allergies  Medications Prior to Admission   Medication Sig Dispense Refill Last Dose     ACETAMINOPHEN PO Take 1,000 mg by mouth every 6 hours as needed   for pain   Taking     amLODIPine (NORVASC) 10 MG tablet Take 1 tablet (10 mg) by   mouth daily 30 tablet 11      calcium-vitamin D (CALTRATE) 600-400 MG-UNIT per tablet Take 1   tablet by mouth daily   2019 at 0800     docusate sodium (COLACE) 50 MG capsule Take 2 capsules (100 mg)   by mouth 2 times daily as needed for constipation 50 capsule 0   2019 at Unknown time     [] dronabinol (MARINOL) 5 MG capsule Take 1 capsule (5   mg) by mouth 2 times daily (before meals) 60 capsule 0 Past Week   at Unknown time     ferrous sulfate (FEROSUL) 325 (65 Fe) MG tablet Take 1 tablet   (325 mg) by mouth daily (with breakfast) 60 tablet 2 2019 at   0600     magnesium oxide (MAG-OX) 400 MG tablet Take 1 tablet (400 mg)   by mouth 2 times daily 180 tablet 3 2019 at 0600     metoprolol tartrate (LOPRESSOR) 25 MG tablet Take 2 tablets (50   mg) by mouth 2 times daily 60 tablet 11 2019 at 0600     multivitamin, therapeutic with minerals (THERA-VIT-M) TABS   tablet Take 1 tablet by mouth daily 30 each 11 2019 at 0800     ondansetron (ZOFRAN) 4 MG tablet Take 1 tablet (4 mg) by mouth   every 12 hours as needed for nausea 60 tablet 0 Taking     order for DME Equipment being ordered: Nebulizer 1 Device 1   Taking     order for DME Equipment being ordered: Nebulizer 1 Device 1   Taking     pantoprazole (PROTONIX) 40 MG EC tablet Take 1 tablet (40 mg)   by mouth 2 times daily 60 tablet 11 2019 at 0600     predniSONE (DELTASONE) 2.5 MG tablet Take two tablets (5mg)   every AM and one tablet (2.5mg) every evening 90 tablet 11   2019 at 0600     sulfamethoxazole-trimethoprim (BACTRIM/SEPTRA) 400-80 MG tablet   Take 1 tablet by mouth daily 90 tablet 3 2019 at 1400     tacrolimus (GENERIC EQUIVALENT) 0.5 MG capsule Take 1  "capsule   (0.5 mg) by mouth every evening Total daily dose of 2mg in AM and   1.5mg in PM 90 capsule 3      tacrolimus (GENERIC EQUIVALENT) 1 MG capsule Take two (1mg)   caps ever AM and one (1mg) cap every PM. Total dose is 2mg in   morning and 1.5mg every evening 270 capsule 3      Current Scheduled Meds    amLODIPine  10 mg Oral Daily     calcium carbonate 600 mg-vitamin D 400 units  1 tablet Oral   Daily     ferrous sulfate  325 mg Oral Daily with breakfast     magnesium oxide  400 mg Oral BID     metoprolol tartrate  50 mg Oral BID     multivitamin w/minerals  1 tablet Oral Daily     pantoprazole  40 mg Oral BID     piperacillin-tazobactam  3.375 g Intravenous Q6H     predniSONE  2.5 mg Oral QPM     predniSONE  5 mg Oral Daily     senna-docusate  1 tablet Oral BID     sodium chloride (PF)  3 mL Intracatheter Q8H     sulfamethoxazole-trimethoprim  1 tablet Oral Daily     tacrolimus  1.5 mg Oral QPM     tacrolimus  2 mg Oral QAM      Current PRN Meds  acetaminophen, docusate sodium, lidocaine 4%, lidocaine (buffered   or not buffered), magnesium hydroxide, melatonin, naloxone,   ondansetron **OR** ondansetron, oxyCODONE, polyethylene glycol,   prochlorperazine **OR** prochlorperazine **OR** prochlorperazine,   sodium chloride (PF)     Physical Exam:     Vital signs:  Temp: 98.4  F (36.9  C) Temp src: Oral BP: (!) 149/86(notified   nurse) Pulse: 84   Resp: 16 SpO2: 100 % O2 Device: None (Room   air)   Height: 162.6 cm (5' 4\") Weight: 55.1 kg (121 lb 8 oz)  I/O:     Intake/Output Summary (Last 24 hours) at 9/12/2019 1118  Last data filed at 9/12/2019 0325  Gross per 24 hour   Intake 1100 ml   Output --   Net 1100 ml     Constitutional: Awake, sitting in bed, in no apparent distress.   HEENT: Eyes with pink conjunctivae, anicteric. Oral mucosa moist   without lesions. Neck supple without lymphadenopathy.   PULM: Good air flow bilaterally, bronchial breath sounds to right   side. Crackles to right side, no rhonchi, " no wheezes. Non-labored   breathing on RA.  CV: Normal S1 and S2. RRR. No murmur, gallop, or rub. No   peripheral edema.   ABD: NABS, soft, No abdominal tenderness, nondistended.    MSK: Moves all extremities. No apparent muscle wasting.   NEURO: Alert and oriented, conversant.   SKIN: Warm, dry. No rash on limited exam.   PSYCH: Mood stable.?     Lines, Drains, and Devices:  Peripheral IV 09/11/19 Right Upper forearm (Active)   Site Assessment WDL 9/12/2019 10:00 AM   Line Status Saline locked 9/12/2019 10:00 AM   Phlebitis Scale 0-->no symptoms 9/12/2019 10:00 AM   Infiltration Scale 0 9/12/2019 10:00 AM   Infiltration Site Treatment Method  None 9/12/2019 10:00 AM   Extravasation? No 9/12/2019 10:00 AM   Number of days: 1     Results:     LABS    CMP:   Recent Labs   Lab 09/11/19  2325      POTASSIUM 4.2   CHLORIDE 100   CO2 22   ANIONGAP 11   GLC 76   BUN 24   CR 1.28*   GFRESTIMATED 46*   GFRESTBLACK 54*   CHELSEY 9.2   PROTTOTAL 8.3   ALBUMIN 2.8*   BILITOTAL 0.4   ALKPHOS 255*   AST 29   ALT 17     CBC:   Recent Labs   Lab 09/11/19 2325   WBC 6.1   RBC 3.28*   HGB 9.1*   HCT 30.8*   MCV 94   MCH 27.7   MCHC 29.5*   RDW 15.5*          INR:   Recent Labs   Lab 09/11/19  2325   INR 1.06       Glucose:   Recent Labs   Lab 09/11/19  2325   GLC 76       Blood Gas: No lab results found in last 7 days.    Culture Data   Recent Labs   Lab 09/12/19  0002 09/11/19  2325   CULT No growth after 5 hours No growth after 5 hours       Virology Data:   Lab Results   Component Value Date    FLUAH1 Negative 03/07/2019    FLUAH3 Negative 03/07/2019    JY8599 Negative 03/07/2019    IFLUB Negative 03/07/2019    RSVA Negative 03/07/2019    RSVB Negative 03/07/2019    PIV1 Negative 03/07/2019    PIV2 Negative 03/07/2019    PIV3 Negative 03/07/2019    HMPV Negative 03/07/2019    HRVS Positive (A) 03/07/2019    ADVBE Negative 03/07/2019    ADVC Negative 03/07/2019    ADVC Negative 01/17/2019    ADVC Negative 12/14/2018        Historical CMV results (last 3 of prior testing):  Lab Results   Component Value Date    CMVQNT <137 (A) 06/05/2019    CMVQNT 494 (A) 03/07/2019    CMVQNT CMV DNA Not Detected 01/17/2019     Lab Results   Component Value Date    CMVLOG <2.1 06/05/2019    CMVLOG 2.7 (H) 03/07/2019    CMVLOG Not Calculated 01/17/2019       Urine Studies    Recent Labs   Lab Test 09/12/19  0125 08/07/19  1512   URINEPH 7.5* 7.0   NITRITE Negative Negative   LEUKEST Negative Trace*   WBCU 3 19*       Most Recent Breeze Pulmonary Function Testing (FVC/FEV1 only)  FVC-Pre   Date Value Ref Range Status   08/07/2019 2.22 L    06/05/2019 2.26 L    03/05/2019 1.97 L    01/16/2019 1.92 L      FVC-%Pred-Pre   Date Value Ref Range Status   08/07/2019 67 %    06/05/2019 70 %    03/05/2019 60 %    01/16/2019 59 %      FEV1-Pre   Date Value Ref Range Status   08/07/2019 1.60 L    06/05/2019 1.65 L    03/05/2019 1.31 L    01/16/2019 1.04 L      FEV1-%Pred-Pre   Date Value Ref Range Status   08/07/2019 61 %    06/05/2019 64 %    03/05/2019 51 %    01/16/2019 40 %        IMAGING    Recent Results (from the past 48 hour(s))   US Abdomen Limited (RUQ)    Narrative    EXAMINATION: Limited Abdominal Ultrasound, 9/11/2019 11:50 PM     COMPARISON: CT abdomen 8/20/2018    HISTORY: Transfer from outside hospital for management of   possible  acute cholecystitis    FINDINGS:   Fluid: No evidence of ascites or pleural effusions.    Liver: The liver demonstrates normal echotexture, measuring 18.9   cm in  craniocaudal dimension. There is no focal mass.     Gallbladder: The gallbladder wall is thickened to 4 mm, however   is not  significantly hyperemic on color Doppler images. Trace   pericholecystic  edema Biliary sludge and cholelithiasis, including stone at the  gallbladder neck.    Bile ducts: The proximal extra hepatic bile duct is dilated to 11   mm,  increased from 8/20/2018. No intrahepatic biliary ductal   dilatation.    Pancreas: Obscured by  shadowing bowel gas.    Kidney: The right kidney measures 9.0 cm long. There is no  hydronephrosis or hydroureter, no shadowing renal calculi, cystic  lesion or mass.       Impression    IMPRESSION:   1.  In the proper clinical setting, findings concerning for acute  cholecystitis, including gallbladder wall thickening,   pericholecystic  edema, and multiple stones at the gallbladder neck.  2.  Distention of the extrahepatic bile ducts to 11 mm, increased   from  8/20/2018, possibly from distal choledocholithiasis (the distal   ducts  are obscured) or recently passed stone.     I have personally reviewed the examination and initial   interpretation  and I agree with the findings.    YONATAN HERNANDES MD        Interventional Radiology Adult/Peds IP Consult: Patient to be seen: Routine within 24 hours; Call back #: see gold 10; 59 y/o female with left sided empyema per CT - thoracentesis and chest tube placement; Requesting provider? Attending physician    Debra Mcintosh PA-C     9/13/2019  8:55 AM  INTERVENTIONAL RADIOLOGY CONSULT NOTE    Patient is a 58 year old female with history of b/l lung   transplant for ILD admitted with cholecystitis. Concern of left   empyema. Request for chest tube placement.      Patient is on IR schedule Friday 9/13/19 for a possible left   chest tube placement. Imaging shows a small left effusion, if   when IR assesses with US team is requesting attempted diagnostic   thoracentesis.   Labs WNL for procedure.    No NPO required.  Medications to be held include: None  Consent will be done prior to procedure.    Platelet Count   Date Value Ref Range Status   09/13/2019 186 150 - 450 10e9/L Final     INR   Date Value Ref Range Status   09/11/2019 1.06 0.86 - 1.14 Final        Please contact the IR charge RN at [phone] for estimated time of   procedure.     Case discussed with Dr. Mckeon and Gabrielle [phone].    Debra Inman PA-C  Interventional Radiology       Infectious Disease  Transplant SOT Adult IP Consult: Patient to be seen: Routine within 24 hrs; Call back #: see gold 10; 57 y/o female s/p LT presented with left sided pain was found to have left empyema - cultures pending (got Abx before chest tu...    Narrative    Montse Womack MD     9/13/2019  2:10 PM  [facility]  Transplant Infectious Disease Consult Note - New Patient     Patient:  Kecia Blue, Date of birth 1960, Medical   record number [id number]  Date of Visit:  09/13/2019  Consult requested by Dr. Romel Rodríguez  for evaluation of antibiotics   for empyema         Assessment and Recommendations:   Recommendations:  - agree with plan for diagnostic thoracentesis including gram   stain, aerobic and anaerobic bacterial culture, marie and fungal   culture and AFB stain and culture as well as actinomyces rule out     - if unable to obtain diagnostic fluid today would consider   holding further antibiotics until we are able to obtain tissue /   fluid for culture (unless patient appears septic, in that case   would resume/continue antibiotics)  - follow up pending CMV quantitative level  - cont prophylactic Bactrim    Thank you very much for this consultation. Transplant Infectious   Disease will continue to follow with you.    Assessment:   59 yo female with pmh anti-synthetase/dermatomyositis who is s/p   bilateral lung transplant (3/1/18) for ILD, course complicated by   R main bronchial stenosis requiring repeated dilation, positive   DSAs and CMV requiring valganciclovir as well as Actinomyces from   BAL who is admitted with L sided abdominal / chest pain.     Acute Infectious Disease issues include:  # L sided empyema, possible adjacent osteomyelitis of the 10th   rib  # LUQ chest pain  # Dilation of biliary duct (initial concern for cholecystitis and   choledocholithiasis at this point thought to be incidental   findings)  Patient admitted with LUQ chest pain, initially thought to be    biliary issue however found to have L sided empyema which is most   likely cause of her symptoms. Not septic on admission. Has been   on pip-tazo since 9/12/19. Plan to go to IR for potential   drainage and chest tube placement with diagnostics on fluid   planning to be sent. As patient not septic and biliary infection   thought to be unlikely at this point, would recommend considering   holding additional antibiotics until we have obtained diagnostic   samples, particularly if collection of samples is delayed for any   reason. She has history of Actinomyces from her BAL cultures,   however at this point differential for empyema remains broad and   includes bacterial, AFB, fungal organisms.        # Low level CMV viremia   Was on ganciclovir until 7/25. Currently biweekly monitoring of   low level CMV viremia. Patient is asymptomatic. CMV quantitative   this admission pending.     Additional/Chronic Infectious Disease issues include:  -Hx of Actinomyces spp from BAL: Patient with positive BAL for   Actinomyces spp on 8/2/2018 and 1/17/2019, Neg March 2019. Did   not appear to worsen in the intervals during which she was not   treated.     - PCP prophylaxis: TMP/SMX  - Serostatus: CMV D+/R+, EBV D+/R+  - Gamma globulin status: 698 on 3/1/2018  - Isolation status:  Good hand hygiene.    Montse Womack MD,  Infectious Diseases Fellow           History of Infectious Disease Illness:    57 yo female with pmh anti-synthetase/dermatomyositis who is s/p   bilateral lung transplant (3/1/18) for ILD, course complicated by   R main bronchial stenosis requiring repeated dilation, positive   DSAs and CMV requiring valganciclovir as well as Actinomyces from   BAL who is admitted with L sided abdominal / chest pain.     Patient states that she has had L sided upper abdominal / chest   pain for several months. It became intolerably worse the past   week or so and thus prompting her to present to the ED. Initially   imaging at  outside facility was concerning for biliary pathology   and she was transferred to [facility]. Initial concern was for   cholecystitis / choledocholithiasis and evaluated by GI and   surgery, however after additional diagnostics she was found to   have ~4cm/2.5cm empyema/abscess. She was afebrile and   hemodynamically stable on admission. She has been on pip-tazo   since 9/12/19. Plan is for IR drainage / chest tube to this area   today.     At the bedside this afternoon she has no acute complaints. The   pain on her L side / chest is still present however improved   somewhat with pain regimen. She denies feeling SOB. She does have   a chronic cough that has been somewhat more yellowish sputum   recently. Denies hemoptysis. Says overall her respiratory status   has been very stable over the past 9 months, and can't recall the   last time that she had pneumonia or major breathing issue. Denies   fevers, chills, nausea/vomiting, abdominal pain or diarrhea (has   been constipated recently), urinary symptoms, rashes. She lives   with her  on a farm, no animals on their farm. Nearby   children also have a farm and they have cattle. No recent   renovation / digging in soil, spelunking or adventuring outside   or travel (apart form trip to [place] and [place]).       Transplants:  3/1/2018 (Lung), Postoperative day:  561.    Coordinator Mikayla Latham    Review of Systems:  CONSTITUTIONAL:  No fevers or chills  EYES: negative for icterus or acute vision changes  ENT:  negative for acute hearing loss, tinnitus, sore throat  RESPIRATORY:  negative for cough, sputum or dyspnea  CARDIOVASCULAR:  + left sided chest pain, neg for palpitations  GASTROINTESTINAL:  negative for nausea, vomiting, diarrhea. + for   constipation  GENITOURINARY:  negative for dysuria or hematuria  HEME:  No easy bruising or bleeding  INTEGUMENT:  negative for rash or pruritus  NEURO:  Negative for headache or tremor    Past Medical History:   Diagnosis Date      Antisynthetase syndrome (H) 2014     Chronic cough      Chronic infection     recent C diff  8/18     Dehydration 8/1/2018     Dermatomyositis (H)      Dysplasia of cervix, low grade (ESTRADA 1)      ILD (interstitial lung disease) (H)      Osteopenia      PONV (postoperative nausea and vomiting)      Pulmonary hypertension (H)      Raynaud's disease      Seronegative rheumatoid arthritis (H)        Past Surgical History:   Procedure Laterality Date     BRONCHOSCOPY (RIGID OR FLEXIBLE), DIAGNOSTIC N/A 4/10/2018    Procedure: COMBINED BRONCHOSCOPY (RIGID OR FLEXIBLE), LAVAGE;;    Surgeon: Mariposa Donohue MD;  [facility]     BRONCHOSCOPY (RIGID OR FLEXIBLE), DILATE BRONCHUS / TRACHEA N/A   10/11/2018    Procedure: BRONCHOSCOPY (RIGID OR FLEXIBLE), DILATE BRONCHUS /   TRACHEA;  Flexible And Rigid Bronchoscopy and Dilation;  Surgeon:   Wilber Lin MD;  [facility]     BRONCHOSCOPY FLEXIBLE N/A 3/13/2018    Procedure: BRONCHOSCOPY FLEXIBLE;  Flexible Bronchoscopy ;    Surgeon: Gissell Sanchez MD;  [facility]     BRONCHOSCOPY FLEXIBLE N/A 5/9/2018    Procedure: BRONCHOSCOPY FLEXIBLE;;  Surgeon: Wilber Lin MD;  [facility]     BRONCHOSCOPY FLEXIBLE AND RIGID N/A 9/10/2018    Procedure: BRONCHOSCOPY FLEXIBLE AND RIGID;  Flexible and Rigid   Bronchoscopy with Balloon Dilation, tissue debulking with cryo,   and Right mainstem bronchus stent placement;  Surgeon: Wilber Lin MD;  [facility]     BRONCHOSCOPY RIGID N/A 6/6/2018    Procedure: BRONCHOSCOPY RIGID;;  Surgeon: Lopez Macias MD;  [facility]     BRONCHOSCOPY, DILATE BRONCHUS, STENT BRONCHUS, COMBINED N/A   6/11/2018    Procedure: COMBINED BRONCHOSCOPY, DILATE BRONCHUS, STENT   BRONCHUS;  Flexible Bronchoscopy, Balloon Dilation, Bronchial   Washings;  Surgeon: Wilber Lin MD;  [facility]     BRONCHOSCOPY, DILATE BRONCHUS, STENT BRONCHUS, COMBINED Right   7/10/2018     Procedure: COMBINED BRONCHOSCOPY, DILATE BRONCHUS, STENT   BRONCHUS;  Flexible Bronchoscopy, Balloon Dilation, Bronchial   Washings  ;  Surgeon: Wilber Lin MD;  [facility]     BRONCHOSCOPY, DILATE BRONCHUS, STENT BRONCHUS, COMBINED N/A   8/2/2018    Procedure: COMBINED BRONCHOSCOPY, DILATE BRONCHUS, STENT   BRONCHUS;  Flexible Bronchoscopy, Bronchial Washings, Balloon   Dilation;  Surgeon: Wilber Lin MD;  [facility]     BRONCHOSCOPY, DILATE BRONCHUS, STENT BRONCHUS, COMBINED N/A   8/20/2018    Procedure: COMBINED BRONCHOSCOPY, DILATE BRONCHUS, STENT   BRONCHUS;  Flexible Bronchoscopy, Balloon Dilation;  Surgeon:   Wilber Lin MD;  [facility]     BRONCHOSCOPY, DILATE BRONCHUS, STENT BRONCHUS, COMBINED N/A   10/29/2018    Procedure: Flexible Bronchoscopy, Balloon Dilation, Stent   Revision, Airway Examination And Therapeutic Suctioning, Cyro   Tumor Debulking;  Surgeon: Wilber Lin MD;  [facility]:   [facility]     BRONCHOSCOPY, DILATE BRONCHUS, STENT BRONCHUS, COMBINED N/A   11/20/2018    Procedure: Rigid Bronchoscopy, Stent Removal and dilitation;    Surgeon: Wilber Lin MD;  [facility]     BRONCHOSCOPY, DILATE BRONCHUS, STENT BRONCHUS, COMBINED N/A   12/14/2018    Procedure: Flexible And Rigid Bronchoscopy, Balloon Dilation,   Bronchial Washing;  Surgeon: Wilber Lin MD;    [facility]     BRONCHOSCOPY, DILATE BRONCHUS, STENT BRONCHUS, COMBINED N/A   1/17/2019    Procedure: Flexible And Rigid Bronchoscopy, Balloon Dilation.;    Surgeon: Wilber Lin MD;  [facility]     BRONCHOSCOPY, DILATE BRONCHUS, STENT BRONCHUS, COMBINED N/A   3/7/2019    Procedure: Flexible and Rigid Bronchoscopy, Bronchial Washing,   Balloon Dilation;  Surgeon: Wilber Lin MD;  [facility]     BRONCHOSCOPY, DILATE BRONCHUS, STENT BRONCHUS, COMBINED N/A   6/6/2019    Procedure: Rigid and Flexible Bronchoscopy, Balloon Dilation;     Surgeon: Wilber Lin MD;  [facility]     ENT SURGERY      tonsillectomy as a child     ESOPHAGOSCOPY, GASTROSCOPY, DUODENOSCOPY (EGD), COMBINED N/A   10/29/2018    Procedure: COMBINED ESOPHAGOSCOPY, GASTROSCOPY, DUODENOSCOPY   (EGD) with biopsies and polypectomy;  Surgeon: Chente Bloom MD;  [facility]     INSERT EXTRACORPORAL MEMBRANE OXYGENATOR ADULT (OUTSIDE OR) N/A   2018    Procedure: INSERT EXTRACORPORAL MEMBRANE OXYGENATOR ADULT   (OUTSIDE OR);  INSERT EXTRACORPORAL MEMBRANE OXYGENATOR ADULT   (OUTSIDE OR) ;  Surgeon: Toby Hernandez MD;    [facility]     no prior surgery       REMOVE EXTRACORPORAL MEMBRANE OXYGENATOR ADULT N/A 3/3/2018    Procedure: REMOVE EXTRACORPORAL MEMBRANE OXYGENATOR ADULT;    Removal of Right Femoral Venous and Right Axillary Arterial   Extracorporeal Membrane Oxygenator;  Surgeon: Toby Hernandez MD;  Location:  OR     TRANSPLANT LUNG RECIPIENT SINGLE X2 Bilateral 3/1/2018    Procedure: TRANSPLANT LUNG RECIPIENT SINGLE X2;  Median   Sternotomy, Extracorporeal Membrane Oxygenator, bilateral   sequential lung transplant;  Surgeon: Toby Hernandez MD;  [facility]       Family History   Problem Relation Age of Onset     Hypertension Mother      Arthritis Mother      Pancreatic Cancer Father      Diabetes Father        Social History     Social History Narrative    3/6/2019 - Lives with . Has three daughters. Four   grandchildren (two ). No pets. Travelled previously to   [facility]. Has visited [place] several times.      Social History     Tobacco Use     Smoking status: Never Smoker     Smokeless tobacco: Never Used   Substance Use Topics     Alcohol use: No     Alcohol/week: 0.6 oz     Types: 1 Glasses of wine per week     Drug use: No       Immunization History   Administered Date(s) Administered     Influenza Vaccine IM > 6 months Valent IIV4 10/29/2018     Pneumo Conj 13-V  (2010&after) 02/24/2016     Pneumococcal 23 valent 11/20/2014     TDAP Vaccine (Boostrix) 12/13/2018     Tdap (Adult) Unspecified Formulation 02/04/2008     Zoster vaccine recombinant adjuvanted (SHINGRIX) 03/06/2019,   06/05/2019       Patient Active Problem List   Diagnosis     Acute on chronic respiratory failure with hypoxia (H)     ILD (interstitial lung disease) (H)     Lung transplant recipient (H)     Steroid-induced hyperglycemia     Deep vein thrombosis (DVT) (H) [I82.409]     Encounter for long-term (current) use of high-risk medication     Dehydration     Acute kidney injury (H)     CMV (cytomegalovirus) (H)     Nausea and vomiting     Low hemoglobin     Cholecystitis            Current Medications & Allergies:       amLODIPine  10 mg Oral Daily     calcium carbonate 600 mg-vitamin D 400 units  1 tablet Oral   Daily     ferrous sulfate  325 mg Oral Daily with breakfast     magnesium oxide  400 mg Oral BID     metoprolol tartrate  50 mg Oral BID     multivitamin w/minerals  1 tablet Oral Daily     pantoprazole  40 mg Oral BID     piperacillin-tazobactam  2.25 g Intravenous Q6H     polyethylene glycol  17 g Oral BID     predniSONE  2.5 mg Oral QPM     predniSONE  5 mg Oral Daily     senna-docusate  2 tablet Oral BID     sodium chloride (PF)  3 mL Intracatheter Q8H     sulfamethoxazole-trimethoprim  1 tablet Oral Daily     tacrolimus  1.5 mg Oral QPM     tacrolimus  2 mg Oral QAM            Physical Exam:   Ranges for vital signs:  Temp:  [97.7  F (36.5  C)-98.9  F (37.2    C)] 98.9  F (37.2  C)  Pulse:  [79-88] 88  Resp:  [16-18] 18  BP: (127-141)/(78-85) 141/81  SpO2:  [94 %-100 %] 94 %  Vitals:    09/11/19 2044 09/12/19 0355 09/13/19 0245   Weight: 54.4 kg (120 lb) 55.1 kg (121 lb 8 oz) 55.6 kg (122 lb   9.6 oz)       Physical Examination:  GENERAL:  well-developed, well-nourished, in bed in no acute   distress.  HEAD:  Head is normocephalic, atraumatic   EYES:  Eyes have anicteric sclerae without  conjunctival injection     ENT:  Oropharynx is moist without exudates or ulcers. Prominent   parotid glands.   NECK:  Supple. No cervical lymphadenopathy  LUNGS:  Decreased breath sounds in L base.   CARDIOVASCULAR:  Regular rate and rhythm with no murmurs, gallops   or rubs.  ABDOMEN:  Normal bowel sounds, soft, nontender. No appreciable   hepatosplenomegaly.  SKIN:  No acute rashes.    NEUROLOGIC:  Grossly nonfocal. Active x4 extremities         Laboratory Data:     Inflammatory Markers    Recent Labs   Lab Test 09/13/19  0934 09/11/19 2325 08/22/19  0820   * 102*  --    CRP 58.0* 66.0* 47.0*       Immune Globulin Studies     Recent Labs   Lab Test 03/01/18  0356 02/19/18  0759    1,790*   IGM  --  502*   IGE  --  <2   IGA  --  425*   IGG1  --  1,300*   IGG2  --  131*   IGG3  --  101   IGG4  --  1*       Metabolic Studies       Recent Labs   Lab Test 09/13/19  0536 09/11/19 2325 08/13/19 08/07/19  0959  03/04/18 1953 03/04/18 0353 03/01/18 2017 02/27/18  0537    133 138 133   < > 150*   < > 150*   < > 150*   < >  --    POTASSIUM 4.5 4.2 4.1 4.2   < > 4.2   < > 4.2   < > 3.4   < >  --      CHLORIDE 105 100 106 98   < > 119*   < > 117*   < > 119*   < >    --    CO2 22 22 22 29   < > 22   < > 20   < > 24   < >  --    ANIONGAP 8 11 14.1 6   < > 9   < > 13   < > 7   < >  --    BUN 31* 24 39.3*  24.56 27   < > 71*   < > 69*   < > 30   < >    --    CR 1.51* 1.28* 1.6 1.80*   < > 2.13*   < > 2.14*   < > 0.95   < >   0.97   GFRESTIMATED 38* 46* 36* 31*   < > 24*   < > 24*   < > 61   < >   60*   * 76 162* 119*   < > 111*   < > 133*   < > 232*   < >  --      A1C  --   --   --   --   --   --   --   --   --  5.5   < >  --    CHELSEY 8.5 9.2 8.9 9.8   < > 7.2*   < > 7.8*   < > 6.8*   < >  --    PHOS  --   --  2.6  --    < >  --   --  5.0*   < > 3.9   < >  --      MAG  --   --  1.7 2.2   < > 2.5*  --  2.8*   < > 1.9   < >  --    LACT  --  1.0  --   --   --  0.8   < > 1.0   < > 1.8   < >  --     PCAL  --   --   --   --   --   --   --   --   --   --   --  <0.05     CKT  --   --   --  36  --   --   --  207  --   --   --   --     < > = values in this interval not displayed.       Hepatic Studies    Recent Labs   Lab Test 09/13/19  0536 09/11/19  2325 01/16/19  0900 12/13/18  1033  08/03/18  0624 08/02/18  1151   BILITOTAL 0.3 0.4 0.3  --    < > 0.3 0.2   DBIL  --   --   --   --   --  <0.1 0.1   ALKPHOS 212* 255* 65  --    < > 72 87   PROTTOTAL 7.0 8.3 7.4  --    < > 5.7* 6.2*   ALBUMIN 2.3* 2.8* 2.8*  --    < > 2.5* 2.6*   AST 21 29 12  --    < > 8 15   ALT 14 17 11  --    < > 18 21   LDH  --   --   --  197  --   --  200    < > = values in this interval not displayed.       Pancreatitis testing    Recent Labs   Lab Test 09/11/19 2325 03/04/18  0353 02/19/18  0759   AMYLASE  --   --  58   LIPASE 127  --   --    TRIG  --  191* 115     Hematology Studies      Recent Labs   Lab Test 09/13/19 0536 09/11/19 2325  08/07/19  0959 06/18/19 06/05/19  0922 03/05/19  0932   WBC 5.0 6.1  --  4.8 4.2* 3.2* 3.2*   ANEU  --  5.0  --  3.3  --   --   --    ALYM  --  0.5*  --  0.9  --   --   --    SHERRI  --  0.5  --  0.6  --   --   --    AEOS  --  0.0  --  0.0  --   --   --    HGB 8.0* 9.1*   < > 9.9* 9.8* 9.9* 8.1*   HCT 26.8* 30.8*   < > 32.3* 31.8* 32.8* 27.3*    189   < > 194  --  278 332    < > = values in this interval not displayed.       Clotting Studies    Recent Labs   Lab Test 09/11/19  2325 08/22/19  0820 08/07/19  0959 06/05/19  0922  08/03/18  0624   INR 1.06 0.94 0.91 1.01   < > 1.09   PTT  --   --   --   --   --  26    < > = values in this interval not displayed.       Iron Testing    Recent Labs   Lab Test 09/13/19  0536  12/13/18  1033  08/20/18  0553  08/01/18  0921  05/08/18  0709   IRON  --   --  16*  --   --   --  93  --  48   FEB  --   --  221*  --   --   --  248  --  275   IRONSAT  --   --  7*  --   --   --  37  --  18   YOLA  --   --  302*  --   --   --  571*   < >  --    MCV 92   < > 107*   <  >  --    < > 101*   < > 102*   FOLIC  --   --   --   --  43.4  --   --   --  34.3   B12  --   --   --   --   --   --  1,753*  --  2,247*   HAPT  --   --  296*  --   --   --   --   --   --    RETP  --   --  4.6*  --   --    < >  --   --   --    RETICABSCT  --   --  94.2  --   --    < >  --   --   --     < > = values in this interval not displayed.     Autoimmune Testing    Recent Labs   Lab Test 05/16/18  1006 02/22/18  1800   [id number] 113  --    RNPIGG 0.2  --    SMIGG <0.2  --    SSAIGG 2.8*  --    SSBIGG 7.3*  --    SCLIGG <0.2 <0.2       Arterial Blood Gas Testing    Recent Labs   Lab Test 03/07/18  0355 03/07/18  0354 03/07/18  0010 03/06/18  2208  03/06/18  1905 03/06/18  1822  03/06/18  1509   PH  --  7.47*  --  7.45  --  7.43 7.43  --  7.41   PCO2  --  35  --  36  --  36 36  --  36   PO2  --  113*  --  159*  --  156* 142*  --  215*   HCO3  --  26  --  25  --  24 24  --  23   O2PER 2L 2L 2L 3L   < > 40 40   < > 50    < > = values in this interval not displayed.        Thyroid Studies     Recent Labs   Lab Test 08/01/18  0921 02/19/18  0759   TSH 1.80 3.07       Urine Studies     Recent Labs   Lab Test 09/12/19  0125 08/07/19  1512 03/04/18  1425 03/01/18  0528 02/23/18  1820   URINEPH 7.5* 7.0 5.5 5.5 6.5   NITRITE Negative Negative Negative Negative Negative   LEUKEST Negative Trace* Negative Negative Negative   WBCU 3 19* 5 4 <1       Medication levels    Recent Labs   Lab Test 09/13/19  0536  03/10/18  1710   VANCOMYCIN  --   --  26.4*   TACROL 9.5   < >  --     < > = values in this interval not displayed.       Body fluid stats    Recent Labs   Lab Test 09/12/19  1220  01/17/19  1207 12/14/18  1054 11/20/18  0855  03/22/18  1049   FTYP  --   --  Right Bronchial washing Bronchial washing   < >   Pleural fluid   FCOL  --   --  Pale Yellow Pink Pink   < > Red   FAPR  --   --  Cloudy Turbid Cloudy   < > Cloudy   FRBC  --   --   --  << Do Not Report >>  --   --   --    FWBC  --   --  1500 9518 9827   < > 0546    FNEU  --   --  87 94 90   < > 87   FLYM  --   --   --  1 6   < > 6   FMONO  --   --  12 4 4   < > 7   FTP  --   --   --   --   --   --  3.1   GS <10 Squamous epithelial cells/low power field  <25 PMNs/low   power field  Few  Mixed gram negative and positive alo     < > >25 PMNs/low power field  No organisms seen >25 PMNs/low   power field  Many  Gram negative diplococci  *  Moderate  Gram positive cocci  * >25 PMNs/low power field  Few  Gram positive cocci  *   < > Few  WBC'S seen  predominantly PMN's    No organisms seen    < > = values in this interval not displayed.       Microbiology:  Last Culture results with specimen source  Culture Micro   Date Value Ref Range Status   09/12/2019 Moderate growth  Normal alo to date    Preliminary   09/12/2019 Culture in progress  Preliminary   09/12/2019 Culture negative after 20 hours  Preliminary   09/12/2019 No growth after 1 day  Preliminary   09/11/2019 No growth after 1 day  Preliminary   03/07/2019 No Actinomyces species isolated  Final   03/07/2019 Culture negative for acid fast bacilli  Final   03/07/2019   Final    Assayed at [facility] [address] [place]   [address] [phone]   03/07/2019 Culture negative after 4 weeks  Final   03/07/2019 No growth after 4 weeks  Final   03/07/2019 Moderate growth  Normal respiratory alo    Final   01/17/2019 (A)  Final    Moderate growth  Actinomyces odontolyticus  This organism is part of normal alo, but on occasion, may be a   true pathogen. Clinical   correlation must be applied to interpreting this microbiology   result.  Susceptibility testing not routinely done     01/17/2019 Heavy growth  Normal respiratory alo    Final   01/17/2019 Culture negative for acid fast bacilli  Final   01/17/2019   Final    Assayed at [facility] [address] [place]   [address] [phone]   01/17/2019 Culture negative after 4 weeks  Final   01/17/2019 No growth after 4 weeks  Final    01/17/2019 Moderate growth  Normal respiratory alo    Final   12/14/2018 (A)  Final    Moderate growth  Actinomyces odontolyticus  Susceptibility testing not routinely done     12/14/2018 Light growth  Normal alo    Final   12/14/2018 Culture negative for acid fast bacilli  Final   12/14/2018   Final    Assayed at [email or web address] [address] [place]   [address] [phone]   12/14/2018 Candida glabrata  isolated   (A)  Final   12/14/2018   Final    No additional fungi cultured after 4 weeks incubation   12/14/2018 No growth after 4 weeks  Final   12/14/2018 Moderate growth  Normal alo    Final   12/14/2018 (A)  Final    Heavy growth  Moraxella (Branhamella) catarrhalis  Beta lactamase positive      Specimen Description   Date Value Ref Range Status   09/12/2019 Sputum Screen  Final   09/12/2019 Sputum Screen  Final   09/12/2019 Sputum Screen  Final   09/12/2019 Blood Left Arm  Final   09/11/2019 Blood Right Arm  Final   03/07/2019 Bronchial washing  Final   03/07/2019 Bronchial washing  Final   03/07/2019 Bronchial washing  Final   03/07/2019 Bronchial washing  Final   03/07/2019 Bronchial washing  Final   03/07/2019 Bronchial washing  Final   03/07/2019 Bronchial washing  Final   03/07/2019 Bronchial washing  Final   01/17/2019 Bronchial washing Right SPECIMEN 1  Final   01/17/2019 Right BROW  Final   01/17/2019 Right BROW  Final   01/17/2019 Bronchial washing Right SPECIMEN 1  Final   01/17/2019 Bronchial washing Right SPECIMEN 1  Final   01/17/2019 Bronchial washing Right SPECIMEN 1  Final   01/17/2019 Bronchial washing Right SPECIMEN 1  Final   01/17/2019 Bronchial washing Right SPECIMEN 1  Final        Last check of C difficile  C Diff Toxin B PCR   Date Value Ref Range Status   12/04/2018 Negative NEG^Negative Final     Comment:     Negative: Clostridium difficile target DNA sequences NOT   detected, presumed   negative for Clostridium difficile toxin B or the number of   bacteria present    may be below the limit of detection for the test.  FDA approved assay performed using [employer] GeneXpert real-time   PCR.  A negative result does not exclude actual disease due to   Clostridium difficile   and may be due to improper collection, handling and storage of   the specimen   or the number of organisms in the specimen is below the detection   limit of the   assay.       Quantiferon testing   Recent Labs   Lab Test 03/07/19  1004 01/17/19  1207 12/14/18  1054 11/20/18  0855  02/19/18  0759   TBRSLT  --   --   --   --   --  Negative   TBAGN  --   --   --   --   --  0.00   AFBSMS Negative for acid fast bacteria  Less than 5ml of   specimen received.  A minimum of 5 mL of sputum or fluid is recommended for recovery   of acid fast bacilli   (AFB).  Volumes less than 5 mL are suboptimal and may compromise   recovery of AFB from   culture.  [Narrative was truncated due to length]   Rheumatology IP Consult: Patient to be seen: Routine - within 24 hours; 57 y/o female with right knee swelling - pain - Hx of dermatomyositis seronegative RA - evaluate for inf arthritis; Consultant may enter orders: Yes; Requesting provider? Atte...    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 Rheumatology   is being consulted for evaluation of bilateral knee pain,   significantly limiting function. Knee exam with prominent   enlarged knees R>L without obvious effusion and interestingly no   tenderness and no erythema but +warmth, but she does have a    significant fullness in the medial aspect of the bilateral knees.   No other signs of active joint inflammation or skin rashes at   this time. CRP and ESR elevated but they are difficult to   interpret in the setting of underlying infection. Recent imaging   in [facility] was significant for consolidated soft tissue   mass involving the entire suprapatellar pouch of the bilateral   knees, likely secondary to overgrowth of the synovium. It is   possible that there is an underlying inflammatory component to   her knee pain but suspect that this soft tissue prominence is the   primary cause of her limitation, especially given the lack of   other joint findings and lack of pain with palpation of her   knees. Typically this is managed surgically and we recommend   orthopedic evaluation. Could consider the addition of plaquenil   in the future if inflammatory arthropathy continues to cause her   significant discomfort and limitations.    Recommendations:  - Orthopedic Consultations  - Consider further imaging of her R knee with MRI (left knee MRI   was completed at outside facility)    The patient was seen and staffed with Dr. Nevarez.     Chano Smith MD  Internal Medicine PGY3    Attending Note: I saw and evaluated the patient with Dr. Singh. I   agree with the assessment and plan. Very complicated patient,   interesting physical finding with B/L  R>L knee enlargement x   months (not acute) without any tenderness on joint palpation.   This is not suggestive of inflammatory arthritis/gout or septic   joint. The limited ROM could be due to synovium   overgrowth/?synovial chondromatosis/?chronic synovial hypertrophy   due to chronic inflammation/?pigmented villonodular synovitis.   DDx is based on MRI and exam findings. Would like to get ortho's   opinion.    If this is synovial chondromatosis, Tx is surgery.    Sam Nevarez MD  Rheumatology staff           Chief Complaint:   Right Knee pain, swelling          History of Present Illness:   Kecia Blue is a 58-year-old female with a history of   bilateral lung transplant (3-2018) for interstitial lung disease,   dermatomyositis, seronegative RA, CMV, anti-synthetase syndrome   who presented to the hospital for evaluation of abdominal pain.    Rheumatology has been consulted for right knee pain and swelling   concerning for a flare of her underlying dermatomyositis and   seronegative RA.  Initially her abdominal pain was thought to be   secondary to cholecystitis versus choledocholithiasis.  However   on MRCP she was incidentally noted to have a left-sided empyema   which is thought to be more consistent with the cause of her   abdominal pain.  She has been treated with broad-spectrum   antibiotics and is currently undergoing evaluation with   interventional radiology for diagnostic thoracentesis plus   possible chest tube.    Regarding her rheumatologic history, in 2014 she was diagnosed   with dermatomyositis after presenting with Gottron's papules, a   rash on her face and knuckles as well as swelling of her small   joints in her hands.  Serum markers were positive for SSA and   SSB.  She has had a positive BENJAMÍN in the past, however her most   recent in May 2018 was negative.  Initially she was treated with   MMF without significant improvement.  She received 1 dose of   rituximab, but did not continue with that therapy.  Eventually   she was started on Imuran with significant improvement of her   skin and joint symptoms.  Her respiratory status continued to   worsen however, she was started on tacrolimus for ILD.    Eventually she underwent bilateral lung transplant.  Currently   she is only taking tacrolimus as well as 5 mg of prednisone   daily.     She has had knee pain and swelling for the past few months. Her   pain has been severely limiting. At this time she struggles with   ambulation and cannot climb any stairs. She describes it as a   stiffness. It is worse  in the morning after waking up. Typically   takes 5-10 minutes to loosen up. Intermittently has redness of   the overlying skin. Both knees are effected equally. She denies   any other joint involvement, nor any further skin lesions. She   has seen an orthopedist as an outpatient who recommended a total   knee arthoplasty. She has gotten injections in the past few   months which was only helpful for about 1 week (last one in R   knee) before she experience significant limitations again.   Reportedly arthrocentesis was attempted with not much fluid   aspirated out of R knee, does not know what it showed.         Past Medical History:     Past Medical History:   Diagnosis Date     Antisynthetase syndrome (H) 2014     Chronic cough      Chronic infection     recent C diff  8/18     Dehydration 8/1/2018     Dermatomyositis (H)      Dysplasia of cervix, low grade (ESTRADA 1)      ILD (interstitial lung disease) (H)      Osteopenia      PONV (postoperative nausea and vomiting)      Pulmonary hypertension (H)      Raynaud's disease      Seronegative rheumatoid arthritis (H)              Past Surgical History:     Past Surgical History:   Procedure Laterality Date     BRONCHOSCOPY (RIGID OR FLEXIBLE), DIAGNOSTIC N/A 4/10/2018    Procedure: COMBINED BRONCHOSCOPY (RIGID OR FLEXIBLE), LAVAGE;;    Surgeon: Mariposa Donohue MD;  Location:  GI     BRONCHOSCOPY (RIGID OR FLEXIBLE), DILATE BRONCHUS / TRACHEA N/A   10/11/2018    Procedure: BRONCHOSCOPY (RIGID OR FLEXIBLE), DILATE BRONCHUS /   TRACHEA;  Flexible And Rigid Bronchoscopy and Dilation;  Surgeon:   Wilber Lin MD;  [facility]     BRONCHOSCOPY FLEXIBLE N/A 3/13/2018    Procedure: BRONCHOSCOPY FLEXIBLE;  Flexible Bronchoscopy ;    Surgeon: Gissell Sanchez MD;  [facility]: [facility]     BRONCHOSCOPY FLEXIBLE N/A 5/9/2018    Procedure: BRONCHOSCOPY FLEXIBLE;;  Surgeon: Wilber Lin MD;  [facility]     BRONCHOSCOPY FLEXIBLE AND RIGID N/A  9/10/2018    Procedure: BRONCHOSCOPY FLEXIBLE AND RIGID;  Flexible and Rigid   Bronchoscopy with Balloon Dilation, tissue debulking with cryo,   and Right mainstem bronchus stent placement;  Surgeon: Wilber Lin MD;  [facility]     BRONCHOSCOPY RIGID N/A 6/6/2018    Procedure: BRONCHOSCOPY RIGID;;  Surgeon: Lopez Macias MD;  [facility]     BRONCHOSCOPY, DILATE BRONCHUS, STENT BRONCHUS, COMBINED N/A   6/11/2018    Procedure: COMBINED BRONCHOSCOPY, DILATE BRONCHUS, STENT   BRONCHUS;  Flexible Bronchoscopy, Balloon Dilation, Bronchial   Washings;  Surgeon: Wilber iLn MD;  [facility]     BRONCHOSCOPY, DILATE BRONCHUS, STENT BRONCHUS, COMBINED Right   7/10/2018    Procedure: COMBINED BRONCHOSCOPY, DILATE BRONCHUS, STENT   BRONCHUS;  Flexible Bronchoscopy, Balloon Dilation, Bronchial   Washings  ;  Surgeon: Wilber Lin MD;  [facility]     BRONCHOSCOPY, DILATE BRONCHUS, STENT BRONCHUS, COMBINED N/A   8/2/2018    Procedure: COMBINED BRONCHOSCOPY, DILATE BRONCHUS, STENT   BRONCHUS;  Flexible Bronchoscopy, Bronchial Washings, Balloon   Dilation;  Surgeon: Wilber Lin MD;  [facility]     BRONCHOSCOPY, DILATE BRONCHUS, STENT BRONCHUS, COMBINED N/A   8/20/2018    Procedure: COMBINED BRONCHOSCOPY, DILATE BRONCHUS, STENT   BRONCHUS;  Flexible Bronchoscopy, Balloon Dilation;  Surgeon:   Wilber Lin MD;  [facility]     BRONCHOSCOPY, DILATE BRONCHUS, STENT BRONCHUS, COMBINED N/A   10/29/2018    Procedure: Flexible Bronchoscopy, Balloon Dilation, Stent   Revision, Airway Examination And Therapeutic Suctioning, Cyro   Tumor Debulking;  Surgeon: Wilber Lin MD;  [facility]     BRONCHOSCOPY, DILATE BRONCHUS, STENT BRONCHUS, COMBINED N/A   11/20/2018    Procedure: Rigid Bronchoscopy, Stent Removal and dilitation;    Surgeon: Wilber Lin MD;  Location: [facility] OR     BRONCHOSCOPY, DILATE BRONCHUS, STENT BRONCHUS,  COMBINED N/A   12/14/2018    Procedure: Flexible And Rigid Bronchoscopy, Balloon Dilation,   Bronchial Washing;  Surgeon: Wilber Lin MD;    Location: [facility] OR     BRONCHOSCOPY, DILATE BRONCHUS, STENT BRONCHUS, COMBINED N/A   1/17/2019    Procedure: Flexible And Rigid Bronchoscopy, Balloon Dilation.;    Surgeon: Wilber Lin MD;  Location: [facility] OR     BRONCHOSCOPY, DILATE BRONCHUS, STENT BRONCHUS, COMBINED N/A   3/7/2019    Procedure: Flexible and Rigid Bronchoscopy, Bronchial Washing,   Balloon Dilation;  Surgeon: Wilber Lin MD;  Location:   [facility]     BRONCHOSCOPY, DILATE BRONCHUS, STENT BRONCHUS, COMBINED N/A   6/6/2019    Procedure: Rigid and Flexible Bronchoscopy, Balloon Dilation;    Surgeon: Wilber Lin MD;  Location:  OR     ENT SURGERY      tonsillectomy as a child     ESOPHAGOSCOPY, GASTROSCOPY, DUODENOSCOPY (EGD), COMBINED N/A   10/29/2018    Procedure: COMBINED ESOPHAGOSCOPY, GASTROSCOPY, DUODENOSCOPY   (EGD) with biopsies and polypectomy;  Surgeon: Chente Bloom MD;  Location:  OR     INSERT EXTRACORPORAL MEMBRANE OXYGENATOR ADULT (OUTSIDE OR) N/A   2/27/2018    Procedure: INSERT EXTRACORPORAL MEMBRANE OXYGENATOR ADULT   (OUTSIDE OR);  INSERT EXTRACORPORAL MEMBRANE OXYGENATOR ADULT   (OUTSIDE OR) ;  Surgeon: Toby Hernandez MD;    Location:  OR     no prior surgery       REMOVE EXTRACORPORAL MEMBRANE OXYGENATOR ADULT N/A 3/3/2018    Procedure: REMOVE EXTRACORPORAL MEMBRANE OXYGENATOR ADULT;    Removal of Right Femoral Venous and Right Axillary Arterial   Extracorporeal Membrane Oxygenator;  Surgeon: Toby Hernandez MD;  Location:  OR     TRANSPLANT LUNG RECIPIENT SINGLE X2 Bilateral 3/1/2018    Procedure: TRANSPLANT LUNG RECIPIENT SINGLE X2;  Median   Sternotomy, Extracorporeal Membrane Oxygenator, bilateral   sequential lung transplant;  Surgeon: Toby Hernandez MD;  Location:  OR            Social  History:     Social History     Socioeconomic History     Marital status:      Spouse name: Not on file     Number of children: Not on file     Years of education: Not on file     Highest education level: Not on file   Occupational History     Not on file   Social Needs     Financial resource strain: Not on file     Food insecurity:     Worry: Not on file     Inability: Not on file     Transportation needs:     Medical: Not on file     Non-medical: Not on file   Tobacco Use     Smoking status: Never Smoker     Smokeless tobacco: Never Used   Substance and Sexual Activity     Alcohol use: No     Alcohol/week: 0.6 oz     Types: 1 Glasses of wine per week     Drug use: No     Sexual activity: Not on file   Lifestyle     Physical activity:     Days per week: Not on file     Minutes per session: Not on file     Stress: Not on file   Relationships     Social connections:     Talks on phone: Not on file     Gets together: Not on file     Attends Voodoo service: Not on file     Active member of club or organization: Not on file     Attends meetings of clubs or organizations: Not on file     Relationship status: Not on file     Intimate partner violence:     Fear of current or ex partner: Not on file     Emotionally abused: Not on file     Physically abused: Not on file     Forced sexual activity: Not on file   Other Topics Concern     Parent/sibling w/ CABG, MI or angioplasty before 65F 55M? No   Social History Narrative    3/6/2019 - Lives with . Has three daughters. Four   grandchildren (two ). No pets. Travelled previously to   [place], [place]. Has visited [place] several times.              Family History:     Family History   Problem Relation Age of Onset     Hypertension Mother      Arthritis Mother      Pancreatic Cancer Father      Diabetes Father              Immunizations:     Most Recent Immunizations   Administered Date(s) Administered     Influenza Vaccine IM > 6 months Valent IIV4  10/29/2018     Pneumo Conj 13-V (2010&after) 02/24/2016     Pneumococcal 23 valent 11/20/2014     TDAP Vaccine (Boostrix) 12/13/2018     Tdap (Adult) Unspecified Formulation 02/04/2008     Zoster vaccine recombinant adjuvanted (SHINGRIX) 06/05/2019             Allergies:   No Known Allergies          Medications:     Medications Prior to Admission   Medication Sig Dispense Refill Last Dose     acetaminophen (TYLENOL) 500 MG tablet Take 1,000 mg by mouth   every 6 hours as needed for pain    Past Week at Unknown time     amLODIPine (NORVASC) 10 MG tablet Take 1 tablet (10 mg) by   mouth daily 30 tablet 11 9/11/2019 at PM     calcium-vitamin D (CALTRATE) 600-400 MG-UNIT per tablet Take 1   tablet by mouth daily   9/11/2019 at 1500     dronabinol (MARINOL) 5 MG capsule Take 5 mg by mouth 2 times   daily (before meals) as needed for nausea   Past Week at Unknown   time     ferrous sulfate (FEROSUL) 325 (65 Fe) MG tablet Take 1 tablet   (325 mg) by mouth daily (with breakfast) 60 tablet 2 9/11/2019 at   AM     magnesium oxide (MAG-OX) 400 MG tablet Take 800 mg by mouth   daily   9/11/2019 at PM     metoprolol tartrate (LOPRESSOR) 25 MG tablet Take 2 tablets (50   mg) by mouth 2 times daily 60 tablet 11 9/11/2019 at PM     multivitamin, therapeutic with minerals (THERA-VIT-M) TABS   tablet Take 1 tablet by mouth daily 30 each 11 9/11/2019 at PM     ondansetron (ZOFRAN) 4 MG tablet Take 1 tablet (4 mg) by mouth   every 12 hours as needed for nausea 60 tablet 0 Past Week at   Unknown time     pantoprazole (PROTONIX) 40 MG EC tablet Take 1 tablet (40 mg)   by mouth 2 times daily 60 tablet 11 9/11/2019 at PM     predniSONE (DELTASONE) 2.5 MG tablet Take two tablets (5mg)   every AM and one tablet (2.5mg) every evening 90 tablet 11   9/11/2019 at PM     sulfamethoxazole-trimethoprim (BACTRIM/SEPTRA) 400-80 MG tablet   Take 1 tablet by mouth daily 90 tablet 3 9/11/2019 at AM     tacrolimus (GENERIC EQUIVALENT) 0.5 MG capsule  Take 1 capsule   (0.5 mg) by mouth every evening Total daily dose of 2mg in AM and   1.5mg in PM 90 capsule 3 9/11/2019 at PM     tacrolimus (GENERIC EQUIVALENT) 1 MG capsule Take two (1mg)   caps ever AM and one (1mg) cap every PM. Total dose is 2mg in   morning and 1.5mg every evening 270 capsule 3 9/11/2019 at PM     order for DME Equipment being ordered: Nebulizer 1 Device 1   Taking     order for DME Equipment being ordered: Nebulizer 1 Device 1   Taking       Current Facility-Administered Medications   Medication     acetaminophen (TYLENOL) tablet 975 mg     amLODIPine (NORVASC) tablet 10 mg     calcium carbonate 600 mg-vitamin D 400 units (CALTRATE) per   tablet 1 tablet     docusate sodium (COLACE) capsule 100 mg     ferrous sulfate (FEROSUL) tablet 325 mg     lidocaine (LMX4) cream     lidocaine 1 % 0.1-1 mL     magnesium hydroxide (MILK OF MAGNESIA) suspension 30 mL     magnesium oxide (MAG-OX) tablet 400 mg     melatonin tablet 1 mg     metoprolol tartrate (LOPRESSOR) tablet 50 mg     multivitamin w/minerals (THERA-VIT-M) tablet 1 tablet     naloxone (NARCAN) injection 0.1-0.4 mg     ondansetron (ZOFRAN-ODT) ODT tab 4 mg    Or     ondansetron (ZOFRAN) injection 4 mg     oxyCODONE (ROXICODONE) tablet 5 mg     pantoprazole (PROTONIX) EC tablet 40 mg     piperacillin-tazobactam (ZOSYN) 2.25 g vial to attach to    ml bag     polyethylene glycol (MIRALAX/GLYCOLAX) Packet 17 g     polyethylene glycol (MIRALAX/GLYCOLAX) Packet 17 g     predniSONE (DELTASONE) tablet 2.5 mg     predniSONE (DELTASONE) tablet 5 mg     prochlorperazine (COMPAZINE) injection 10 mg    Or     prochlorperazine (COMPAZINE) tablet 10 mg    Or     prochlorperazine (COMPAZINE) Suppository 25 mg     senna-docusate (SENOKOT-S/PERICOLACE) 8.6-50 MG per tablet 2   tablet     sodium chloride (PF) 0.9% PF flush 3 mL     sodium chloride (PF) 0.9% PF flush 3 mL     sulfamethoxazole-trimethoprim (BACTRIM/SEPTRA) 400-80 MG per   tablet 1 tablet  "    tacrolimus (GENERIC EQUIVALENT) capsule 1.5 mg     tacrolimus (GENERIC EQUIVALENT) capsule 2 mg            Review of Systems:   Complete 12 point ROS completed and negative unless mentioned in   HPI.          Physical Exam:   BP (!) 141/81 (BP Location: Right arm)   Pulse 88   Temp 98.9    F (37.2  C) (Oral)   Resp 18   Ht 1.626 m (5' 4\")   Wt 55.6   kg (122 lb 9.6 oz)   LMP 06/07/2014 (Exact Date)   SpO2 94%     BMI 21.04 kg/m      General: NAD  HEENT: NC/AT. EOMi. PERRL. White sclera. No mucosal lesions  Lymph: No cervical lymphadenopathy  CV: RRR, no m/r/g  Lung: CTAB, reduced breath sounds in the left lower lobe  Abdomen: Soft, non-tender, non-distended  Neuro: Alert and oriented without focal deficits.  Skin/Hair: No dermatomyositis rashes, no  hands  Ext: WWP. No edema  Msk: Hand joints examined without any active synovitis or joint   tenderness. Mild sclerodactyly. Full ROM of the hands. Knees with   bilateral fullness R>L. No erythema. + warmth. No tenderness on   palpation of the bilateral knees. Limited flexion of the knees.          Data:   Labs and imaging reviewed.     Chano Smith MD  Internal Medicine PGY3       Patient Demographics:  58-year-old female.        Patient History:  Chronic pain of both knees.  Synovial   hypertrophy, possible intra-articular   malignancy.  Pain and swelling, significant synovitis,   calcifications, PVNS.  Assess for soft   tissue mass in suprapatellar space ongoing for months, pain scale   8/10.  Patient Hx: Left knee   pain and swelling right above knee, several months.       Relevant Surgery:  None.        TECHNICAL INFORMATION           Protocol:  MRI of the left knee was performed using a   combination of T1-weighted, Proton   Density, T2-weighted and/or STIR images.  Post-contrast   fat-suppressed T1-weighted images were   subsequently performed.       IV Contrast:  10 mL Dotarem.       Sedation/Analgesia:  None.   "     COMPARISON:  Radiographs of the left knee dated 6/21/2018.     _____________________________________________________________     CONCLUSION      1.  There is marked distention of the joint capsule as well as   the   semimembranosus/gastrocnemius bursa with a heterogeneous soft   tissue process as discussed   below.  There is irregular enhancement of the peripheral joint   capsule although the majority of   the contents of the capsule do not enhance.  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  Note   that no osseous bodies are seen on the corresponding comparison   radiographs, although   comparison to more recent radiographs is recommended.  The   differential would also include   pigmented villonodular synovitis as multiple sequences   demonstrate a rind of lower signal   intensity along the periphery of the capsule, suggesting   hemosiderin deposition.  However, no   diagnostic gradient echo sequences were performed to evaluate for   blooming artifact.  The   patient may return at no additional charge for additional   sequences to include axial, sagittal   and gradient echo images if clinically useful.     2.  Note is made that this process was evident on the previous   plain film radiographs from June 2018.  The lack of significant extension beyond the capsule may   favor a more benign process   such as synovial chondromatosis or PVNS, although other   malignancies could not be completely   excluded.     3.  Tearing of the medial and lateral menisci.     4.  Tricompartment osteoarthritic changes, greatest involving the   medial compartment.     5.  No significant ligament or tendon abnormalities.     _____________________________________________________________     INTERPRETATION     Osseous Structures      Fractures/Contusions:  No definite acute or chronic fractures are   visualized.     Masses:  No bony  masses are identified.     Joint Capsule      Effusion:  No significant free fluid is seen within the joint   capsule.       Synovitis:  There is marked distention of the joint capsule by   soft tissue, demonstrating   intermediate to lower signal intensity soft tissue on proton   density images.  Much of the   intra-articular soft tissue demonstrates a nodular   appearance.  On fat-suppressed T2-weighted   images, there is a background of edematous soft tissue   surrounding many of these individual   nodular masses.  Postcontrast images demonstrate diffuse   enhancement of the periphery of the   joint capsule which is fairly confluent along the medial and   lateral compartment joint lines.    However, there is nonenhancement of the soft tissue components   centrally within the   suprapatellar recess.  This process appears to remain confined to   the joint capsule without   extension beyond the capsule into the surrounding soft   tissues.  The notable exception is   extension of this process into the semimembranosus/gastrocnemius   bursa.     Intra-articular Bodies:  As noted above, many of the soft tissue   elements distending the joint   capsule have a rounded appearance and may represent chondral   bodies.     Cruciate Ligaments      Anterior Cruciate Ligament:  Normal.     Posterior Cruciate Ligament:  Normal.     Extensor Tendon Complex      Quadriceps Tendon:  Normal.     Patellar Tendon:  Normal.     Posteromedial Corner Complex      Medial Collateral Ligament:  Normal.     Semimembranosus Tendon:  Normal.     Posterolateral Corner Complex      Fibular Collateral Ligament:  Normal.     Biceps Femoris Tendon Complex:  Normal.     Popliteus Tendon:  Normal.     Iliotibial Band:  Normal.     Medial Compartment      Medial Meniscus:  Residua of broad-based tearing and attenuation   is seen of the entire middle   and posterior third segments of the medial meniscus.  The   anterior horn remains intact.      Articular Cartilage:  There is a broad-based grade II   chondromalacia of the medial femoral   condyle articular surfaces with reciprocal broad-based grade   III/IV chondromalacia of the   medial tibial plateau.     Lateral Compartment      Lateral Meniscus:  There is short segment tearing and attenuation   of the far posterior horn of   the lateral meniscus coursing into its tibial attachment   site.  There is additional cleavage   tearing involving the central aspect and anterior body of the   middle one-third segment of the   lateral meniscus.  There is truncation of the inner margin of the   anterior horn which may   reflect additional tearing.     Articular Cartilage:  There is diffuse grade II chondromalacia of   the weightbearing and   nonweightbearing articular surfaces of the lateral compartment   which may include areas of grade   III chondromalacia of the nonweightbearing portions of the   lateral tibial plateau.     Patellofemoral Compartment      Articular Cartilage:  There is broad-based grade II/III chondral   loss of the patellofemoral   joint space.     Patellar Alignment:  Normal.     Medial Patellar Retinaculum/MPFL:  Normal.     Fat Pads:  There is nonspecific edema within the infrapatellar   fat pad.     Periarticular Soft Tissues      Popliteal Cyst:  As noted above, there is distention of the   semimembranosus/gastrocnemius bursa   with the soft tissue process distending the joint capsule.     Periarticular Cysts:  None.     Musculature:  There is generalized increased signal intensity   present within the   posterior/proximal calf musculature.     Subcutaneous Tissues:  Unremarkable.     Neurovascular Structures:  Normal.     KMK:tb      Read By: Lidia Juarez M.D.     Reviewed and Electronically Signed By: Lidia Juarez M.D.    Blood culture   Result Value Ref Range    Specimen Description Blood Right Arm     Special Requests Aerobic and anaerobic bottles received     Culture Micro No  growth after 3 days    Blood culture   Result Value Ref Range    Specimen Description Blood Left Arm     Special Requests Aerobic and anaerobic bottles received     Culture Micro No growth after 3 days    Sputum Culture Aerobic Bacterial   Result Value Ref Range    Specimen Description Sputum Screen     Culture Micro Moderate growth  Normal alo       Culture Micro (A)      Light growth  Stenotrophomonas maltophilia  Susceptibility testing in progress      Culture Micro Culture in progress    Gram stain   Result Value Ref Range    Specimen Description Sputum Screen     Gram Stain <10 Squamous epithelial cells/low power field     Gram Stain <25 PMNs/low power field     Gram Stain Few  Mixed gram negative and positive alo      Fungus culture   Result Value Ref Range    Specimen Description Sputum Screen     Culture Micro Culture negative after 20 hours    Blood Culture AFB   Result Value Ref Range    Specimen Description Blood     Culture Micro No growth after 21 hours         Recent Results (from the past 48 hour(s))   IR Thoracentesis    Narrative    PRE-PROCEDURE DIAGNOSIS: Left pleural effusion    PROCEDURE: IR THORACENTESIS    Impression    IMPRESSION: Failed thoracentesis, unable to obtain fluid for  diagnostic testing using two separate punctures under ultrasound  guidance.     PLAN: Follow-up per primary medicine an infectious disease team's.    ----------    CLINICAL HISTORY: Patient with interstitial lung disease status post  bilateral lung transplant March 20, 2018 admitted with left  abdominal/chest pain found have left pleural effusion concerning for  empyema and possibly adjacent left rib osteomyelitis. IR has been  consulted for diagnostic left thoracentesis and possible chest tube  placement.     COMPARISON: CT chest and MR abdomen 9/12/2019    PERFORMED BY: Kenneth Sparrow PA-C and Case Amaya MD    CONSENT: Written informed consent was obtained and is documented in  the patient  record.    MEDICATIONS: 1% lidocaine for local anesthesia      NURSING: Patient continuously monitored by trained, independent  observer.     DESCRIPTION: Targeted ultrasound shows small complex effusion on the  left consistent with recent CT. Patient positioned upright and the  skin overlying the left pleural effusion was prepped and draped in the  usual sterile fashion.    Under continuous ultrasound visualization, the skin and pleura was  anesthetized before a centesis needle/catheter system was advanced  into the effusion, however no fluid was able to be aspirated. A wire  would not track either so everything was removed. Further ultrasound 1  rib space superior showed effusion and a second centesis needle was  advanced into this collection, however no fluid was aspirated.  Occlusive dressings applied. Primary medicine team updated.     COMPLICATIONS: No immediate concerns, the patient remained stable  throughout the procedure and tolerated it well.    ESTIMATED BLOOD LOSS: Minimal    SPECIMENS: None    URMILA WOODS PA-C   MR Knee Right w/o Contrast    Narrative    MR right knee without contrast 9/15/2019 10:43 AM    Techniques: Multiplanar multisequence imaging of the right knee was  obtained without administration of intra-articular or intravenous  contrast using routing protocol. No contrast was administered due to  relatively low GFR.    History: Knee pain, initial exam; 59 y/o female post transplant with  right knee pain , HX of seronegative RA    Comparison: None available.    Findings:    MENISCI:  Medial meniscus: Marked truncation of the body and posterior horn of  the medial meniscus with tearing of the posterior ligament. No  corresponding size of displaced meniscal flap/fragment is identified;  therefore, meniscus is likely macerated.  Lateral meniscus: Horizontal tear of the body of lateral meniscus.    LIGAMENTS  Cruciate ligaments: Intact.  Medial supporting structures: Edema signal along the  tibial collateral  ligament, likely related to altered biomechanics secondary to  underlying meniscal pathology.  Lateral supporting structures: Relatively thin fibular collateral  ligament with intermediate signal, likely due to partial tearing with  altered biomechanics. Severe tendinosis and high-grade partial tearing  of the popliteus tendon. Biceps femoris tendon and iliotibial band are  intact.    EXTENSOR MECHANISM  Patellar tendinosis. Quadriceps tendon is intact.    FLUID  Large joint effusion with extensive internal debris, likely synovitis  in the provided clinical setting. Small fluid in the popliteal cyst  with internal debris.     OSSEOUS and ARTICULAR STRUCTURES  Bones: No fracture, contusion. Likely erosion posteriorly in the  lateral femoral condyle (image 12 series 3 for instance) and smaller  are medial femoral condyle (image 9 series 5).    Area of subchondral edema in the peripheral subcortical edema may be  related to osteopenia.    Patellofemoral compartment: Mild to moderate diffuse patellar  articular cartilage thinning. Area of full-thickness chondral  fissuring in the trochlea cartilage centrally with subchondral edema.    Medial compartment: Areas of high-grade to full-thickness chondral  loss and thinning with subtle subchondral edema in the medial  compartment.    Lateral compartment: No focal high-grade chondral loss.    ANCILLARY FINDINGS  Nonspecific muscle edema greatest in vastus lateralis. Mild  subcutaneous soft tissue edema.      Impression    Impression:  No contrast was administered due to relatively low GFR.  1. Constellation of findings in keeping with history of rheumatoid  arthritis.    a. Large knee joint effusion with extensive synovitis.   b. Bony erosion in medial and lateral femoral condyles.  2. Partial tear of fibular collateral ligament and high grade partial  tear with severe tendinosis of popliteus.  3. Maceration of medial meniscus.  4. Horizontal tear of  lateral meniscus.    JOSSELYN LOPEZ                Pending Results:     Unresulted Labs Ordered in the Past 30 Days of this Admission     Date and Time Order Name Status Description    9/14/2019 1435 Aspergillus Galactomannan Antigen In process     9/14/2019 1435 1,3 Beta D glucan fungitell In process     9/14/2019 1308 Histoplasma Capsulatum Agn Non Blood In process     9/14/2019 1308 Histoplasma capsulatum antigen In process     9/14/2019 1308 Blood Culture AFB Preliminary     9/14/2019 1308 Coccidioides antibody In process     9/14/2019 1308 Histoplasma Madelin by Immunodiffusion In process     9/14/2019 1308 Blastomyces antibody ID In process     9/14/2019 1308 Quantiferon TB Gold Plus In process     9/14/2019 1308 Blastomyces Agn Quant EIA Blood In process     9/12/2019 1141 Fungus culture Preliminary     9/12/2019 1141 Sputum Culture Aerobic Bacterial Preliminary     9/11/2019 2245 Blood culture Preliminary     9/11/2019 2245 Blood culture Preliminary                   Discharge Instructions and Follow-Up:     Discharge Procedure Orders   Orthopedics Adult Referral     Reason for your hospital stay   Order Comments: Left lower lobe empyema   Constipation  Image findings concerning for cholecystitis     Adult [facility] Follow-up and recommended labs and tests   Order Comments: Follow up with primary care provider, Rosy Vu, within 7 days to evaluate medication change.  The following labs/tests are recommended:   CBC, BMP, CMV PCR (blood), Procalcitonin [please route results to pulmonary team at [facility]].     Follow Up and recommended labs and tests   Order Comments: Please do the following blood work next week  on or about 9/23:   CBC, BMP, CMV PCR (blood), Procalcitonin [please route results to pulmonary team at [facility]].     Activity   Order Comments: Your activity upon discharge: activity as tolerated     Order Specific Question Answer Comments   Is discharge order? Yes      When to contact your care  team   Order Comments: Call your primary doctor if you have any of the following:   Worsening pain on the left side , new right side abdominal pain, un explained nausea/vomiting after eating, fevers, worsening cough, increased sputum     Discharge Instructions   Order Comments: Take Levofloxacin 750 mg every other day for 10 days (total 5 pills)   Use laxatives to ensure you have daily bowel movement   Call  to arrange follow up with Orthopedic surgery (referral in place)  You will be contacted by Rheumatology to arrange follow up regarding knee     Full Code     Order Specific Question Answer Comments   Code status determined by: Discussion with patient/legal decision maker      Diet   Order Comments: Follow this diet upon discharge: Orders Placed This Encounter      Regular Diet Adult     Order Specific Question Answer Comments   Is discharge order? Yes             Date of service: 9/15/2019 (confirm it is the actual date of DC)     >30 minutes spent in discharge, including >50% in counseling and coordination of care, medication review and plan of care recommended on follow up. Questions were answered.   Rosy Valero  (PCP) was contacted at the time of discharge, to bridge from hospital to outpatient care.   It was our pleasure to care for Ms. Blue. Please do not hesitate to contact me should there be questions regarding the hospital course or discharge plan.      Darion Rodríguez MD  Internal Medicine Hospitalist & Staff Physician  [facility]  Pager: [phone]

## 2019-09-15 NOTE — PLAN OF CARE
R knee MRI was reviewed, suggestive of RA/uncontrolled inflammatory arthritis. We briefly discussed adding HCQ at the time of consult, but will see her back on Oct 9, 2019 (most likely time 7:30 am) to discuss tx options as HCQ might not be strong enough. Will contact pt tomorrow with time/date of appointment. Agree with f/u with ortho as outpt as well. Discussed with primary team.    Sam Nevarez MD

## 2019-09-15 NOTE — PLAN OF CARE
D: Pt here for surg evaluation of abdominal pain, cholelithiasis/empyema of L lung. 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   I: scheduled meds given. MRI checklist reviewed with patient. Sent for MRI.  A: AVSS, denies pain. Pleasant and cooperative.  P:  Update team with changes/concerns. Discharge today.    DISCHARGE:    Discharged to:  Home  Via:  Automobile  Accompanied by:  Family  Discharge Instructions:  new medications, when to seek medical attention, follow up instructions.  Prescriptions:  To be filled by [facility] per pt's request; medication list reviewed & sent with pt, pt verified pharmacy is open Sundays.  Follow Up Appointments:  Arranged; information given  Belongings:  All sent with pt  IV:  Out  Telemetry:  Off  Pt exhibits understanding of above discharge instructions; all questions answered.

## 2019-09-16 ENCOUNTER — TELEPHONE (OUTPATIENT)
Dept: TRANSPLANT | Facility: CLINIC | Age: 59
End: 2019-09-16

## 2019-09-16 DIAGNOSIS — M17.0 ARTHRITIS OF BOTH KNEES: Primary | ICD-10-CM

## 2019-09-16 DIAGNOSIS — Z94.2 LUNG REPLACED BY TRANSPLANT (H): Primary | ICD-10-CM

## 2019-09-16 LAB
GALACTOMANNAN AG SERPL QL IA: POSITIVE
GALACTOMANNAN AG SERPL-ACNC: 1.08
GAMMA INTERFERON BACKGROUND BLD IA-ACNC: 0.09 IU/ML
M TB IFN-G BLD-IMP: NEGATIVE
M TB IFN-G CD4+ BCKGRND COR BLD-ACNC: 1.02 IU/ML
MITOGEN IGNF BCKGRD COR BLD-ACNC: 0 IU/ML
MITOGEN IGNF BCKGRD COR BLD-ACNC: 0 IU/ML

## 2019-09-16 NOTE — TELEPHONE ENCOUNTER
Spoke with patient after discharging yesterday. She reports doing well, taking it easy at home. Recs per Dr. Christensen: Plan for CT instead of chest x-ray when here in October, already ordered and scheduled. Patient needs to see rheumatology and orthopedics, will message schedulers about this, referrals placed. Labs in 1 week. Patient reports she was able to get all of her new meds at discharge. Denies other questions/concerns at this time.

## 2019-09-17 ENCOUNTER — TELEPHONE (OUTPATIENT)
Dept: TRANSPLANT | Facility: CLINIC | Age: 59
End: 2019-09-17

## 2019-09-17 DIAGNOSIS — Z94.2 LUNG REPLACED BY TRANSPLANT (H): Primary | ICD-10-CM

## 2019-09-17 DIAGNOSIS — A49.8 INFECTION DUE TO STENOTROPHOMONAS MALTOPHILIA: ICD-10-CM

## 2019-09-17 LAB
1,3 BETA GLUCAN SER-MCNC: 122 PG/ML
B-D GLUCAN INTERPRETATION (1,3): POSITIVE
BACTERIA SPEC CULT: ABNORMAL
COCCIDIOIDES AB SPEC QL ID: NORMAL
SPECIMEN SOURCE: ABNORMAL

## 2019-09-17 RX ORDER — SULFAMETHOXAZOLE/TRIMETHOPRIM 800-160 MG
1 TABLET ORAL 2 TIMES DAILY
Qty: 28 TABLET | Refills: 0 | Status: ON HOLD | OUTPATIENT
Start: 2019-09-17 | End: 2019-10-09

## 2019-09-17 NOTE — TELEPHONE ENCOUNTER
Qianao isolated from sputum sample on 9/12/19 intermediately sensitive to Levaquin. Per Ame, change to Bactrim, 1 DS tab BID for 14 days. Per pharmacist, no renal adjustment needed if CrCl >30, patient's is 34. Notified patient. To hold single strength tab while on DS treatment. To be sure to stay hydrated.

## 2019-09-18 LAB
BACTERIA SPEC CULT: NO GROWTH
BACTERIA SPEC CULT: NO GROWTH
Lab: NORMAL
Lab: NORMAL
SPECIMEN SOURCE: NORMAL
SPECIMEN SOURCE: NORMAL

## 2019-09-18 NOTE — TELEPHONE ENCOUNTER
RECORDS RECEIVED FROM: Arthritis of both knees, MRI right knee @ [facility]/ patient will mail CD MRI of left knee - scheduled per pt. Referred by  Christelle Lopez MD   DATE RECEIVED: Oct 10, 2019    NOTES STATUS DETAILS   OFFICE NOTE from referring provider N/A    OFFICE NOTE from other specialist Care Everywhere 8/8/19 [place], PAC   DISCHARGE SUMMARY from hospital Internal 9/11/19   DISCHARGE REPORT from the ER N/A    OPERATIVE REPORT N/A    MEDICATION LIST Internal    IMPLANT RECORD/STICKER N/A    LABS     CBC/DIFF N/A    CULTURES N/A    INJECTIONS DONE IN RADIOLOGY N/A    MRI Received  Internal R 9/15/19  [facility] 8/13/19   CT SCAN N/A    XRAYS (IMAGES & REPORTS) Received  [facility] 8/8/19   TUMOR     PATHOLOGY  Slides & report N/A      09/18/19 SE  4:06 PM  Faxed request to [facility] orthopedics for images

## 2019-09-19 LAB — B DERMAT AB SER QL ID: NORMAL

## 2019-09-20 LAB
LAB SCANNED RESULT: NORMAL

## 2019-09-23 LAB — LAB SCANNED RESULT: NORMAL

## 2019-09-24 DIAGNOSIS — Z94.2 LUNG REPLACED BY TRANSPLANT (H): ICD-10-CM

## 2019-09-24 PROCEDURE — 80197 ASSAY OF TACROLIMUS: CPT | Performed by: INTERNAL MEDICINE

## 2019-09-25 ENCOUNTER — TELEPHONE (OUTPATIENT)
Dept: TRANSPLANT | Facility: CLINIC | Age: 59
End: 2019-09-25

## 2019-09-25 DIAGNOSIS — Z94.2 LUNG REPLACED BY TRANSPLANT (H): Primary | ICD-10-CM

## 2019-09-25 NOTE — TELEPHONE ENCOUNTER
BD Glucan and aspergillus galactomannan both positive on 9/14/19. Per Ame and Dr. Christensen, patient to see ID. Patient already has CT scan upcoming on 10/10/19. Patient reports still having some L sided pain, takes gabapentin before bed every other night and Tylenol as needed, no pain at any other time. Reports breathing is good.     Discussed necessity to see rheumatology with Dr. Nevarez who thinks patient should see both rheum (to discuss meds) and ortho (to discuss need for surgery). Patient agreeable to this.

## 2019-09-26 ENCOUNTER — TELEPHONE (OUTPATIENT)
Dept: TRANSPLANT | Facility: CLINIC | Age: 59
End: 2019-09-26

## 2019-09-26 DIAGNOSIS — Z94.2 S/P LUNG TRANSPLANT (H): ICD-10-CM

## 2019-09-26 LAB
TACROLIMUS BLD-MCNC: 6 UG/L (ref 5–15)
TME LAST DOSE: NORMAL H

## 2019-09-26 RX ORDER — TACROLIMUS 1 MG/1
2 CAPSULE ORAL 2 TIMES DAILY
Qty: 360 CAPSULE | Refills: 3 | Status: ON HOLD | OUTPATIENT
Start: 2019-09-26 | End: 2019-10-29

## 2019-09-26 RX ORDER — TACROLIMUS 0.5 MG/1
0.5 CAPSULE ORAL EVERY EVENING
Qty: 90 CAPSULE | Refills: 3 | Status: SHIPPED | OUTPATIENT
Start: 2019-09-26 | End: 2019-09-26

## 2019-09-26 RX ORDER — TACROLIMUS 1 MG/1
2 CAPSULE ORAL 2 TIMES DAILY
Qty: 360 CAPSULE | Refills: 3 | Status: SHIPPED | OUTPATIENT
Start: 2019-09-26 | End: 2019-09-26

## 2019-09-26 RX ORDER — TACROLIMUS 0.5 MG/1
0.5 CAPSULE ORAL EVERY MORNING
Qty: 90 CAPSULE | Refills: 3 | Status: ON HOLD | OUTPATIENT
Start: 2019-09-26 | End: 2019-10-29

## 2019-09-26 NOTE — TELEPHONE ENCOUNTER
Tacrolimus level 6 at 12 hours, on 9/24/19  Goal 8-10.   Current dose 2 mg in AM, 1.5 mg in PM    Dose changed to 2.5 mg in AM, 2 mg in PM   Recheck level in 7 days    LM for patient, requested call back to confirm.

## 2019-09-26 NOTE — TELEPHONE ENCOUNTER
RECORDS RECEIVED FROM: Internal - Possible Empyema, positive BD glucan and aspergillus galactomannan // per pt // Ame Chow referring   DATE RECEIVED: 10/16/2019   NOTES (Gather within 2 years) STATUS DETAILS   OFFICE NOTE from referring provider   Internal 09.25.2019   OFFICE NOTE from other specialist N/A    DISCHARGE SUMMARY from hospital N/A    DISCHARGE REPORT from the ER N/A    LABS (any labs) Internal  Care Everywhere    MEDICATION LIST Internal    IMAGING  (NEED IMAGES AND REPORTS)     Osteomyelitis: Foot imaging  N/A    Liver Abscess: Abdominal imaging N/A    Other (anything related to diagnoses N/A

## 2019-09-30 ENCOUNTER — TELEPHONE (OUTPATIENT)
Dept: TRANSPLANT | Facility: CLINIC | Age: 59
End: 2019-09-30

## 2019-09-30 NOTE — LETTER
PHYSICIAN ORDERS      DATE & TIME ISSUED: 2019 2:18 PM  PATIENT NAME: Kecia Blue   : 1960     [facility] MR# [if applicable]: [id number]     DIAGNOSIS:  Long Term use of medications  Z79.899, Lung Transplant  Z94.2 and Complications of Lung Transplant T86.819      or 10/1: Patient needs Chest CT without contrast    Please call patient at [phone] to schedule.     Please push images to [employer] through PACS    Any questions please call: Gaye [phone]    Please fax these results to [phone].      .

## 2019-09-30 NOTE — LETTER
PHYSICIAN ORDERS      DATE & TIME ISSUED: 2019 2:18 PM  PATIENT NAME: Kecia Blue   : 1960     [facility] MR# [if applicable]: [id number]     DIAGNOSIS:  Long Term use of medications  Z79.899, Lung Transplant  Z94.2 and Complications of Lung Transplant T86.819      or 10/1: Patient needs Chest CT without contrast    Please call patient at [phone] to schedule.     Please push images to [employer] through PACS    Any questions please call: Gaye [phone]    Please fax these results to [phone].      .

## 2019-09-30 NOTE — TELEPHONE ENCOUNTER
Per Dr. Christensen, pt to try robatussin and cough drops for cough. Also get chest CT for LUQ pain.  Orders faxed to [facility] [phone]  Patient updated and in agreement with plan.

## 2019-09-30 NOTE — TELEPHONE ENCOUNTER
"Pt calls today to say for the last 2 mornings, she has been coughing, \"gagging up some phlegm and then coughing until vomiting\". Sputum is clear. Advised patient to try some Robitussin and lozenges.   Also, pt states LUQ pain has gotten worse, \"not as bad as when I was in the hospital, but worse since discharge\". Pain is manageable during the day, however patient is unable to sleep at night. Tylenol and gabapentin not relieving pain, some relief with hot pack. States pain is like \"a constant pain - like a dull headache except in my side\".   Notified Dr. Christensen requesting recommendations.   "

## 2019-10-01 ENCOUNTER — TRANSFERRED RECORDS (OUTPATIENT)
Dept: HEALTH INFORMATION MANAGEMENT | Facility: CLINIC | Age: 59
End: 2019-10-01

## 2019-10-02 ENCOUNTER — TELEPHONE (OUTPATIENT)
Dept: TRANSPLANT | Facility: CLINIC | Age: 59
End: 2019-10-02

## 2019-10-02 NOTE — LETTER
PHYSICIAN ORDERS      DATE & TIME ISSUED: 2019 1:54 PM  PATIENT NAME: Kecia Blue   : 1960     [facility] MR# [if applicable]: [id number]     DIAGNOSIS:  Lung Transplant  Z94.2    Please check BMP on either 10/3/19 or 10/4/19. Thanks!    Any questions please call:     Please fax these results to [phone].        Ame Chow

## 2019-10-02 NOTE — TELEPHONE ENCOUNTER
Spoke with Kecia on the phone regarding lab results from 10/1/19. Per Ame Chow, hydrate with 80 oz of water today and recheck labs tomorrow or Friday. Avoid foods high in potassium. Patient agreed with plan.

## 2019-10-02 NOTE — LETTER
PHYSICIAN ORDERS      DATE & TIME ISSUED: 2019 1:54 PM  PATIENT NAME: Kecia Blue   : 1960     [facility] MR# [if applicable]: [id number]     DIAGNOSIS:  Lung Transplant  Z94.2    Please check BMP on either 10/3/19 or 10/4/19. Thanks!    Any questions please call:     Please fax these results to [phone].        Ame Chow

## 2019-10-04 ENCOUNTER — TELEPHONE (OUTPATIENT)
Dept: TRANSPLANT | Facility: CLINIC | Age: 59
End: 2019-10-04

## 2019-10-04 NOTE — TELEPHONE ENCOUNTER
Spoke with patient. Report has been received. CD with images being mailed. Will notify Ame and Dr. Christensen of report, will have images uploaded ASAP upon receipt. Discussed with patient.

## 2019-10-04 NOTE — TELEPHONE ENCOUNTER
Kecia had CT  Of chest done 10/01/19 at [facility]. I called to get a 'push' into our system but nothing has been sent as yet-we only have written report that is scanned in.  Kecia wanted to discuss results with you.

## 2019-10-06 ENCOUNTER — APPOINTMENT (OUTPATIENT)
Dept: ULTRASOUND IMAGING | Facility: CLINIC | Age: 59
DRG: 205 | End: 2019-10-06
Attending: PHYSICIAN ASSISTANT
Payer: COMMERCIAL

## 2019-10-06 ENCOUNTER — APPOINTMENT (OUTPATIENT)
Dept: GENERAL RADIOLOGY | Facility: CLINIC | Age: 59
DRG: 205 | End: 2019-10-06
Attending: INTERNAL MEDICINE
Payer: COMMERCIAL

## 2019-10-06 ENCOUNTER — HOSPITAL ENCOUNTER (INPATIENT)
Facility: CLINIC | Age: 59
LOS: 6 days | Discharge: HOME | DRG: 205 | End: 2019-10-12
Attending: INTERNAL MEDICINE | Admitting: INTERNAL MEDICINE
Payer: COMMERCIAL

## 2019-10-06 DIAGNOSIS — R10.9 LEFT FLANK PAIN: ICD-10-CM

## 2019-10-06 DIAGNOSIS — Z94.2 LUNG REPLACED BY TRANSPLANT (H): ICD-10-CM

## 2019-10-06 DIAGNOSIS — E43 SEVERE PROTEIN-CALORIE MALNUTRITION (H): ICD-10-CM

## 2019-10-06 DIAGNOSIS — J94.8 PLEURAL MASS: ICD-10-CM

## 2019-10-06 DIAGNOSIS — I10 ESSENTIAL HYPERTENSION: Primary | ICD-10-CM

## 2019-10-06 DIAGNOSIS — R07.9 CHEST PAIN, UNSPECIFIED TYPE: ICD-10-CM

## 2019-10-06 DIAGNOSIS — J86.9 EMPYEMA OF LUNG (H): ICD-10-CM

## 2019-10-06 LAB
ALBUMIN SERPL-MCNC: 3 G/DL (ref 3.4–5)
ALP SERPL-CCNC: 279 U/L (ref 40–150)
ALT SERPL W P-5'-P-CCNC: 22 U/L (ref 0–50)
ANION GAP SERPL CALCULATED.3IONS-SCNC: 11 MMOL/L (ref 3–14)
AST SERPL W P-5'-P-CCNC: 24 U/L (ref 0–45)
BASOPHILS # BLD AUTO: 0 10E9/L (ref 0–0.2)
BASOPHILS NFR BLD AUTO: 0.2 %
BILIRUB SERPL-MCNC: 0.4 MG/DL (ref 0.2–1.3)
BUN SERPL-MCNC: 21 MG/DL (ref 7–30)
CALCIUM SERPL-MCNC: 9.3 MG/DL (ref 8.5–10.1)
CHLORIDE SERPL-SCNC: 102 MMOL/L (ref 94–109)
CO2 SERPL-SCNC: 21 MMOL/L (ref 20–32)
CREAT SERPL-MCNC: 1.71 MG/DL (ref 0.52–1.04)
CRP SERPL-MCNC: 25 MG/L (ref 0–8)
D DIMER PPP FEU-MCNC: 2.7 UG/ML FEU (ref 0–0.5)
DIFFERENTIAL METHOD BLD: ABNORMAL
EOSINOPHIL # BLD AUTO: 0 10E9/L (ref 0–0.7)
EOSINOPHIL NFR BLD AUTO: 0.2 %
ERYTHROCYTE [DISTWIDTH] IN BLOOD BY AUTOMATED COUNT: 16.9 % (ref 10–15)
GFR SERPL CREATININE-BSD FRML MDRD: 33 ML/MIN/{1.73_M2}
GLUCOSE SERPL-MCNC: 95 MG/DL (ref 70–99)
HCT VFR BLD AUTO: 31.6 % (ref 35–47)
HGB BLD-MCNC: 9.2 G/DL (ref 11.7–15.7)
IMM GRANULOCYTES # BLD: 0.1 10E9/L (ref 0–0.4)
IMM GRANULOCYTES NFR BLD: 0.7 %
INR PPP: 1.06 (ref 0.86–1.14)
INTERPRETATION ECG - MUSE: NORMAL
LIPASE SERPL-CCNC: 172 U/L (ref 73–393)
LYMPHOCYTES # BLD AUTO: 0.7 10E9/L (ref 0.8–5.3)
LYMPHOCYTES NFR BLD AUTO: 7.6 %
MAGNESIUM SERPL-MCNC: 2 MG/DL (ref 1.6–2.3)
MCH RBC QN AUTO: 26.8 PG (ref 26.5–33)
MCHC RBC AUTO-ENTMCNC: 29.1 G/DL (ref 31.5–36.5)
MCV RBC AUTO: 92 FL (ref 78–100)
MONOCYTES # BLD AUTO: 0.6 10E9/L (ref 0–1.3)
MONOCYTES NFR BLD AUTO: 5.9 %
NEUTROPHILS # BLD AUTO: 8 10E9/L (ref 1.6–8.3)
NEUTROPHILS NFR BLD AUTO: 85.4 %
NRBC # BLD AUTO: 0 10*3/UL
NRBC BLD AUTO-RTO: 0 /100
PHOSPHATE SERPL-MCNC: 3.9 MG/DL (ref 2.5–4.5)
PLATELET # BLD AUTO: 121 10E9/L (ref 150–450)
POTASSIUM SERPL-SCNC: 4.4 MMOL/L (ref 3.4–5.3)
PROCALCITONIN SERPL-MCNC: 0.08 NG/ML
PROT SERPL-MCNC: 8.3 G/DL (ref 6.8–8.8)
RBC # BLD AUTO: 3.43 10E12/L (ref 3.8–5.2)
SODIUM SERPL-SCNC: 134 MMOL/L (ref 133–144)
TROPONIN I SERPL-MCNC: <0.015 UG/L (ref 0–0.04)
WBC # BLD AUTO: 9.4 10E9/L (ref 4–11)

## 2019-10-06 PROCEDURE — 85025 COMPLETE CBC W/AUTO DIFF WBC: CPT | Performed by: EMERGENCY MEDICINE

## 2019-10-06 PROCEDURE — 86140 C-REACTIVE PROTEIN: CPT | Performed by: EMERGENCY MEDICINE

## 2019-10-06 PROCEDURE — 84484 ASSAY OF TROPONIN QUANT: CPT | Performed by: EMERGENCY MEDICINE

## 2019-10-06 PROCEDURE — 25000128 H RX IP 250 OP 636: Performed by: PHYSICIAN ASSISTANT

## 2019-10-06 PROCEDURE — 93010 ELECTROCARDIOGRAM REPORT: CPT | Mod: Z6 | Performed by: INTERNAL MEDICINE

## 2019-10-06 PROCEDURE — 96374 THER/PROPH/DIAG INJ IV PUSH: CPT | Performed by: INTERNAL MEDICINE

## 2019-10-06 PROCEDURE — 99285 EMERGENCY DEPT VISIT HI MDM: CPT | Mod: 25 | Performed by: INTERNAL MEDICINE

## 2019-10-06 PROCEDURE — 93005 ELECTROCARDIOGRAM TRACING: CPT | Performed by: INTERNAL MEDICINE

## 2019-10-06 PROCEDURE — 71046 X-RAY EXAM CHEST 2 VIEWS: CPT

## 2019-10-06 PROCEDURE — 84145 PROCALCITONIN (PCT): CPT | Performed by: EMERGENCY MEDICINE

## 2019-10-06 PROCEDURE — 99223 1ST HOSP IP/OBS HIGH 75: CPT | Mod: AI | Performed by: INTERNAL MEDICINE

## 2019-10-06 PROCEDURE — 25000132 ZZH RX MED GY IP 250 OP 250 PS 637: Performed by: PHYSICIAN ASSISTANT

## 2019-10-06 PROCEDURE — 25800030 ZZH RX IP 258 OP 636: Performed by: PHYSICIAN ASSISTANT

## 2019-10-06 PROCEDURE — 83735 ASSAY OF MAGNESIUM: CPT | Performed by: EMERGENCY MEDICINE

## 2019-10-06 PROCEDURE — 83690 ASSAY OF LIPASE: CPT | Performed by: EMERGENCY MEDICINE

## 2019-10-06 PROCEDURE — 96375 TX/PRO/DX INJ NEW DRUG ADDON: CPT | Performed by: INTERNAL MEDICINE

## 2019-10-06 PROCEDURE — 93970 EXTREMITY STUDY: CPT

## 2019-10-06 PROCEDURE — 25000131 ZZH RX MED GY IP 250 OP 636 PS 637: Performed by: PHYSICIAN ASSISTANT

## 2019-10-06 PROCEDURE — 80053 COMPREHEN METABOLIC PANEL: CPT | Performed by: EMERGENCY MEDICINE

## 2019-10-06 PROCEDURE — 85379 FIBRIN DEGRADATION QUANT: CPT | Performed by: EMERGENCY MEDICINE

## 2019-10-06 PROCEDURE — 85610 PROTHROMBIN TIME: CPT | Performed by: EMERGENCY MEDICINE

## 2019-10-06 PROCEDURE — 99207 ZZC APP CREDIT; MD BILLING SHARED VISIT: CPT | Performed by: PHYSICIAN ASSISTANT

## 2019-10-06 PROCEDURE — 12000012 ZZH R&B MS OVERFLOW UMMC

## 2019-10-06 PROCEDURE — 84100 ASSAY OF PHOSPHORUS: CPT | Performed by: EMERGENCY MEDICINE

## 2019-10-06 PROCEDURE — 25000128 H RX IP 250 OP 636: Performed by: INTERNAL MEDICINE

## 2019-10-06 RX ORDER — NALOXONE HYDROCHLORIDE 0.4 MG/ML
.1-.4 INJECTION, SOLUTION INTRAMUSCULAR; INTRAVENOUS; SUBCUTANEOUS
Status: DISCONTINUED | OUTPATIENT
Start: 2019-10-06 | End: 2019-10-12 | Stop reason: HOSPADM

## 2019-10-06 RX ORDER — SODIUM CHLORIDE 9 MG/ML
INJECTION, SOLUTION INTRAVENOUS CONTINUOUS
Status: DISCONTINUED | OUTPATIENT
Start: 2019-10-06 | End: 2019-10-09

## 2019-10-06 RX ORDER — DRONABINOL 5 MG/1
5 CAPSULE ORAL
Status: DISCONTINUED | OUTPATIENT
Start: 2019-10-07 | End: 2019-10-09

## 2019-10-06 RX ORDER — TACROLIMUS 1 MG/1
2 CAPSULE ORAL EVERY EVENING
Status: DISCONTINUED | OUTPATIENT
Start: 2019-10-06 | End: 2019-10-07

## 2019-10-06 RX ORDER — ONDANSETRON 4 MG/1
4 TABLET, ORALLY DISINTEGRATING ORAL EVERY 6 HOURS PRN
Status: DISCONTINUED | OUTPATIENT
Start: 2019-10-06 | End: 2019-10-12 | Stop reason: HOSPADM

## 2019-10-06 RX ORDER — AMOXICILLIN 250 MG
2 CAPSULE ORAL 2 TIMES DAILY
Status: DISCONTINUED | OUTPATIENT
Start: 2019-10-06 | End: 2019-10-12 | Stop reason: HOSPADM

## 2019-10-06 RX ORDER — POLYETHYLENE GLYCOL 3350 17 G/17G
17 POWDER, FOR SOLUTION ORAL DAILY PRN
Status: DISCONTINUED | OUTPATIENT
Start: 2019-10-06 | End: 2019-10-12 | Stop reason: HOSPADM

## 2019-10-06 RX ORDER — AMLODIPINE BESYLATE 10 MG/1
10 TABLET ORAL DAILY
Status: DISCONTINUED | OUTPATIENT
Start: 2019-10-07 | End: 2019-10-12 | Stop reason: HOSPADM

## 2019-10-06 RX ORDER — ONDANSETRON 2 MG/ML
4 INJECTION INTRAMUSCULAR; INTRAVENOUS EVERY 6 HOURS PRN
Status: DISCONTINUED | OUTPATIENT
Start: 2019-10-06 | End: 2019-10-12 | Stop reason: HOSPADM

## 2019-10-06 RX ORDER — MULTIPLE VITAMINS W/ MINERALS TAB 9MG-400MCG
1 TAB ORAL DAILY
Status: DISCONTINUED | OUTPATIENT
Start: 2019-10-07 | End: 2019-10-12 | Stop reason: HOSPADM

## 2019-10-06 RX ORDER — AMOXICILLIN 250 MG
1 CAPSULE ORAL 2 TIMES DAILY
Status: DISCONTINUED | OUTPATIENT
Start: 2019-10-06 | End: 2019-10-12 | Stop reason: HOSPADM

## 2019-10-06 RX ORDER — PROCHLORPERAZINE MALEATE 5 MG
10 TABLET ORAL EVERY 6 HOURS PRN
Status: DISCONTINUED | OUTPATIENT
Start: 2019-10-06 | End: 2019-10-12 | Stop reason: HOSPADM

## 2019-10-06 RX ORDER — ONDANSETRON 2 MG/ML
4 INJECTION INTRAMUSCULAR; INTRAVENOUS ONCE
Status: COMPLETED | OUTPATIENT
Start: 2019-10-06 | End: 2019-10-06

## 2019-10-06 RX ORDER — SULFAMETHOXAZOLE AND TRIMETHOPRIM 400; 80 MG/1; MG/1
1 TABLET ORAL DAILY
Status: DISCONTINUED | OUTPATIENT
Start: 2019-10-07 | End: 2019-10-12 | Stop reason: HOSPADM

## 2019-10-06 RX ORDER — ONDANSETRON 2 MG/ML
4 INJECTION INTRAMUSCULAR; INTRAVENOUS EVERY 6 HOURS PRN
Status: DISCONTINUED | OUTPATIENT
Start: 2019-10-06 | End: 2019-10-06

## 2019-10-06 RX ORDER — METOPROLOL TARTRATE 25 MG/1
50 TABLET, FILM COATED ORAL 2 TIMES DAILY
Status: DISCONTINUED | OUTPATIENT
Start: 2019-10-06 | End: 2019-10-11

## 2019-10-06 RX ORDER — PANTOPRAZOLE SODIUM 40 MG/1
40 TABLET, DELAYED RELEASE ORAL 2 TIMES DAILY
Status: DISCONTINUED | OUTPATIENT
Start: 2019-10-06 | End: 2019-10-12 | Stop reason: HOSPADM

## 2019-10-06 RX ORDER — AMOXICILLIN 250 MG
2 CAPSULE ORAL 2 TIMES DAILY PRN
Status: DISCONTINUED | OUTPATIENT
Start: 2019-10-06 | End: 2019-10-12 | Stop reason: HOSPADM

## 2019-10-06 RX ORDER — METOCLOPRAMIDE HYDROCHLORIDE 5 MG/ML
10 INJECTION INTRAMUSCULAR; INTRAVENOUS EVERY 6 HOURS PRN
Status: DISCONTINUED | OUTPATIENT
Start: 2019-10-06 | End: 2019-10-12 | Stop reason: HOSPADM

## 2019-10-06 RX ORDER — HYDROMORPHONE HYDROCHLORIDE 1 MG/ML
0.5 INJECTION, SOLUTION INTRAMUSCULAR; INTRAVENOUS; SUBCUTANEOUS
Status: DISCONTINUED | OUTPATIENT
Start: 2019-10-06 | End: 2019-10-06

## 2019-10-06 RX ORDER — ONDANSETRON 4 MG/1
4 TABLET, ORALLY DISINTEGRATING ORAL EVERY 6 HOURS PRN
Status: DISCONTINUED | OUTPATIENT
Start: 2019-10-06 | End: 2019-10-06

## 2019-10-06 RX ORDER — GABAPENTIN 300 MG/1
300 CAPSULE ORAL AT BEDTIME
Status: DISCONTINUED | OUTPATIENT
Start: 2019-10-06 | End: 2019-10-09

## 2019-10-06 RX ORDER — NALOXONE HYDROCHLORIDE 0.4 MG/ML
.1-.4 INJECTION, SOLUTION INTRAMUSCULAR; INTRAVENOUS; SUBCUTANEOUS
Status: DISCONTINUED | OUTPATIENT
Start: 2019-10-06 | End: 2019-10-06

## 2019-10-06 RX ORDER — PROCHLORPERAZINE 25 MG
25 SUPPOSITORY, RECTAL RECTAL EVERY 12 HOURS PRN
Status: DISCONTINUED | OUTPATIENT
Start: 2019-10-06 | End: 2019-10-12 | Stop reason: HOSPADM

## 2019-10-06 RX ORDER — MAGNESIUM OXIDE 400 MG/1
800 TABLET ORAL DAILY
Status: DISCONTINUED | OUTPATIENT
Start: 2019-10-07 | End: 2019-10-12 | Stop reason: HOSPADM

## 2019-10-06 RX ORDER — PREDNISONE 2.5 MG/1
2.5 TABLET ORAL AT BEDTIME
Status: DISCONTINUED | OUTPATIENT
Start: 2019-10-06 | End: 2019-10-12 | Stop reason: HOSPADM

## 2019-10-06 RX ORDER — OXYCODONE HYDROCHLORIDE 5 MG/1
5-10 TABLET ORAL EVERY 4 HOURS PRN
Status: DISCONTINUED | OUTPATIENT
Start: 2019-10-06 | End: 2019-10-12 | Stop reason: HOSPADM

## 2019-10-06 RX ORDER — LIDOCAINE 40 MG/G
CREAM TOPICAL
Status: DISCONTINUED | OUTPATIENT
Start: 2019-10-06 | End: 2019-10-12 | Stop reason: HOSPADM

## 2019-10-06 RX ORDER — METOCLOPRAMIDE 5 MG/1
10 TABLET ORAL EVERY 6 HOURS PRN
Status: DISCONTINUED | OUTPATIENT
Start: 2019-10-06 | End: 2019-10-12 | Stop reason: HOSPADM

## 2019-10-06 RX ORDER — PREDNISONE 5 MG/1
5 TABLET ORAL EVERY MORNING
Status: DISCONTINUED | OUTPATIENT
Start: 2019-10-07 | End: 2019-10-12 | Stop reason: HOSPADM

## 2019-10-06 RX ORDER — FERROUS SULFATE 325(65) MG
325 TABLET ORAL
Status: DISCONTINUED | OUTPATIENT
Start: 2019-10-07 | End: 2019-10-12 | Stop reason: HOSPADM

## 2019-10-06 RX ADMIN — TACROLIMUS 2 MG: 1 CAPSULE ORAL at 22:22

## 2019-10-06 RX ADMIN — ONDANSETRON HYDROCHLORIDE 4 MG: 2 INJECTION, SOLUTION INTRAMUSCULAR; INTRAVENOUS at 22:22

## 2019-10-06 RX ADMIN — OXYCODONE HYDROCHLORIDE 5 MG: 5 TABLET ORAL at 22:22

## 2019-10-06 RX ADMIN — SENNOSIDES AND DOCUSATE SODIUM 2 TABLET: 8.6; 5 TABLET ORAL at 22:25

## 2019-10-06 RX ADMIN — PANTOPRAZOLE SODIUM 40 MG: 40 TABLET, DELAYED RELEASE ORAL at 22:22

## 2019-10-06 RX ADMIN — METOPROLOL TARTRATE 50 MG: 25 TABLET ORAL at 22:22

## 2019-10-06 RX ADMIN — HYDROMORPHONE HYDROCHLORIDE 0.5 MG: 1 INJECTION, SOLUTION INTRAMUSCULAR; INTRAVENOUS; SUBCUTANEOUS at 16:21

## 2019-10-06 RX ADMIN — PREDNISONE 2.5 MG: 2.5 TABLET ORAL at 22:22

## 2019-10-06 RX ADMIN — SODIUM CHLORIDE: 9 INJECTION, SOLUTION INTRAVENOUS at 23:51

## 2019-10-06 RX ADMIN — ONDANSETRON 4 MG: 2 INJECTION INTRAMUSCULAR; INTRAVENOUS at 16:53

## 2019-10-06 ASSESSMENT — ENCOUNTER SYMPTOMS
ABDOMINAL PAIN: 1
VOMITING: 1
FEVER: 0
SHORTNESS OF BREATH: 0
FLANK PAIN: 1
NECK PAIN: 0
WHEEZING: 0
NAUSEA: 1
NUMBNESS: 0
CHILLS: 0
BACK PAIN: 0
ADENOPATHY: 0
LIGHT-HEADEDNESS: 0
WEAKNESS: 0
HEADACHES: 0
CONFUSION: 0
DIARRHEA: 1
COUGH: 1
TROUBLE SWALLOWING: 0
DYSURIA: 0
PALPITATIONS: 0

## 2019-10-06 ASSESSMENT — MIFFLIN-ST. JEOR
SCORE: 1102.53
SCORE: 1137.46

## 2019-10-06 NOTE — ED NOTES
[facility], [place]   ED Nurse to Floor Handoff     Kecia Blue is a 58 year old female who speaks English and lives with a spouse,  in a home  They arrived in the ED by car from home    ED Chief Complaint: Abdominal Pain    ED Dx;   Final diagnoses:   Left flank pain   Pleural mass   Lung replaced by transplant (H)         Needed?: No    Allergies: No Known Allergies.  Past Medical Hx:   Past Medical History:   Diagnosis Date     Antisynthetase syndrome (H) 2014     Chronic cough      Chronic infection     recent C diff  8/18     Dehydration 8/1/2018     Dermatomyositis (H)      Dysplasia of cervix, low grade (ESTRADA 1)      ILD (interstitial lung disease) (H)      Osteopenia      PONV (postoperative nausea and vomiting)      Pulmonary hypertension (H)      Raynaud's disease      Seronegative rheumatoid arthritis (H)       Baseline Mental status: WDL  Current Mental Status changes: at basesline    Infection present or suspected this encounter: no  Sepsis suspected: No  Isolation type: Contact     Activity level - Baseline/Home:  Independent  Activity Level - Current:   Independent    Bariatric equipment needed?: No    In the ED these meds were given:   Medications   HYDROmorphone (PF) (DILAUDID) injection 0.5 mg (0.5 mg Intravenous Given 10/6/19 1621)   ondansetron (ZOFRAN) injection 4 mg (4 mg Intravenous Given 10/6/19 1653)       Drips running?  No    Home pump  No    Current LDAs  Peripheral IV 10/06/19 Left (Active)   Number of days: 0       Labs results:   Labs Ordered and Resulted from Time of ED Arrival Up to the Time of Departure from the ED   CBC WITH PLATELETS DIFFERENTIAL - Abnormal; Notable for the following components:       Result Value    RBC Count 3.43 (*)     Hemoglobin 9.2 (*)     Hematocrit 31.6 (*)     MCHC 29.1 (*)     RDW 16.9 (*)     Platelet Count 121 (*)     Absolute Lymphocytes 0.7 (*)     All other components within normal limits   COMPREHENSIVE  "METABOLIC PANEL - Abnormal; Notable for the following components:    Creatinine 1.71 (*)     GFR Estimate 33 (*)     GFR Estimate If Black 38 (*)     Albumin 3.0 (*)     Alkaline Phosphatase 279 (*)     All other components within normal limits   CRP INFLAMMATION - Abnormal; Notable for the following components:    CRP Inflammation 25.0 (*)     All other components within normal limits   D DIMER QUANTITATIVE - Abnormal; Notable for the following components:    D Dimer 2.7 (*)     All other components within normal limits   CMV DNA QUANTIFICATION   INR   LIPASE   TROPONIN I       Imaging Studies:   Recent Results (from the past 24 hour(s))   XR Chest 2 Views    Narrative    Small right pleural effusion with hazy retrocardiac opacities favored  to represent atelectasis, less likely infection.        Recent vital signs:   BP (!) 160/96   Pulse 92   Temp 98.7  F (37.1  C) (Oral)   Resp 12   Ht 1.626 m (5' 4\")   Wt 56.2 kg (124 lb)   LMP 06/07/2014 (Exact Date)   SpO2 99%   BMI 21.28 kg/m              Cardiac Rhythm: Normal Sinus  Pt needs tele? No  Skin/wound Issues: None    Code Status: Full Code    Pain control: good    Nausea control: good    Abnormal labs/tests/findings requiring intervention:     Family present during ED course? Yes   Family Comments/Social Situation comments:     Tasks needing completion: None    Zeus Garcia, RN  [phone] [facility]    "

## 2019-10-06 NOTE — ED PROVIDER NOTES
History     Chief Complaint   Patient presents with     Abdominal Pain     HPI  Kecia Blue is a 58 year old female who presents with worsened LUQ abdominal pain./ Pain is somewhat worse with inspiration. She has been admitted for the symptoms last month. She had an area of pleural fluid density of concern for empyema which was aspirated without significant yield. She has had no fever, chills or sweats. She has had chronic non productive cough. She has nausea with occasional vomiting, mostly with coughing. She has occasional diarrhea. She has never had skin rash. The pain is in the LUQ and radiates to the left flank. She has no dysuria. She had CTs in July and September at outside facilities.    Past Medical History:   Diagnosis Date     Antisynthetase syndrome (H) 2014     Chronic cough      Chronic infection     recent C diff  8/18     Dehydration 8/1/2018     Dermatomyositis (H)      Dysplasia of cervix, low grade (ESTRADA 1)      ILD (interstitial lung disease) (H)      Osteopenia      PONV (postoperative nausea and vomiting)      Pulmonary hypertension (H)      Raynaud's disease      Seronegative rheumatoid arthritis (H)        Past Surgical History:   Procedure Laterality Date     BRONCHOSCOPY (RIGID OR FLEXIBLE), DIAGNOSTIC N/A 4/10/2018    Procedure: COMBINED BRONCHOSCOPY (RIGID OR FLEXIBLE), LAVAGE;;  Surgeon: Mariposa Donohue MD;  [facility]     BRONCHOSCOPY (RIGID OR FLEXIBLE), DILATE BRONCHUS / TRACHEA N/A 10/11/2018    Procedure: BRONCHOSCOPY (RIGID OR FLEXIBLE), DILATE BRONCHUS / TRACHEA;  Flexible And Rigid Bronchoscopy and Dilation;  Surgeon: Wilber Lin MD;  [facility]     BRONCHOSCOPY FLEXIBLE N/A 3/13/2018    Procedure: BRONCHOSCOPY FLEXIBLE;  Flexible Bronchoscopy ;  Surgeon: Gissell Sanchez MD;  [facility]     BRONCHOSCOPY FLEXIBLE N/A 5/9/2018    Procedure: BRONCHOSCOPY FLEXIBLE;;  Surgeon: Wilber Lin MD;  [facility]     BRONCHOSCOPY  FLEXIBLE AND RIGID N/A 9/10/2018    Procedure: BRONCHOSCOPY FLEXIBLE AND RIGID;  Flexible and Rigid Bronchoscopy with Balloon Dilation, tissue debulking with cryo, and Right mainstem bronchus stent placement;  Surgeon: Wilber Lin MD;  [facility]     BRONCHOSCOPY RIGID N/A 6/6/2018    Procedure: BRONCHOSCOPY RIGID;;  Surgeon: Lopez Macias MD;  [facility]     BRONCHOSCOPY, DILATE BRONCHUS, STENT BRONCHUS, COMBINED N/A 6/11/2018    Procedure: COMBINED BRONCHOSCOPY, DILATE BRONCHUS, STENT BRONCHUS;  Flexible Bronchoscopy, Balloon Dilation, Bronchial Washings;  Surgeon: Wilber Lin MD;  [facility]     BRONCHOSCOPY, DILATE BRONCHUS, STENT BRONCHUS, COMBINED Right 7/10/2018    Procedure: COMBINED BRONCHOSCOPY, DILATE BRONCHUS, STENT BRONCHUS;  Flexible Bronchoscopy, Balloon Dilation, Bronchial Washings  ;  Surgeon: Wilber Lin MD;  [facility]     BRONCHOSCOPY, DILATE BRONCHUS, STENT BRONCHUS, COMBINED N/A 8/2/2018    Procedure: COMBINED BRONCHOSCOPY, DILATE BRONCHUS, STENT BRONCHUS;  Flexible Bronchoscopy, Bronchial Washings, Balloon Dilation;  Surgeon: Wilber Lin MD;  [facility]     BRONCHOSCOPY, DILATE BRONCHUS, STENT BRONCHUS, COMBINED N/A 8/20/2018    Procedure: COMBINED BRONCHOSCOPY, DILATE BRONCHUS, STENT BRONCHUS;  Flexible Bronchoscopy, Balloon Dilation;  Surgeon: Wilber Lin MD;  [facility]     BRONCHOSCOPY, DILATE BRONCHUS, STENT BRONCHUS, COMBINED N/A 10/29/2018    Procedure: Flexible Bronchoscopy, Balloon Dilation, Stent Revision, Airway Examination And Therapeutic Suctioning, Cyro Tumor Debulking;  Surgeon: Wilber Lin MD;  [facility]     BRONCHOSCOPY, DILATE BRONCHUS, STENT BRONCHUS, COMBINED N/A 11/20/2018    Procedure: Rigid Bronchoscopy, Stent Removal and dilitation;  Surgeon: Wilber Lin MD;  [facility]     BRONCHOSCOPY, DILATE BRONCHUS, STENT BRONCHUS, COMBINED N/A 12/14/2018     Procedure: Flexible And Rigid Bronchoscopy, Balloon Dilation, Bronchial Washing;  Surgeon: Wilber Lin MD;  [facility]     BRONCHOSCOPY, DILATE BRONCHUS, STENT BRONCHUS, COMBINED N/A 1/17/2019    Procedure: Flexible And Rigid Bronchoscopy, Balloon Dilation.;  Surgeon: Wilber Lin MD;  [facility]     BRONCHOSCOPY, DILATE BRONCHUS, STENT BRONCHUS, COMBINED N/A 3/7/2019    Procedure: Flexible and Rigid Bronchoscopy, Bronchial Washing, Balloon Dilation;  Surgeon: Wilber Lin MD;  [facility]     BRONCHOSCOPY, DILATE BRONCHUS, STENT BRONCHUS, COMBINED N/A 6/6/2019    Procedure: Rigid and Flexible Bronchoscopy, Balloon Dilation;  Surgeon: Wilber Lin MD;  Location:  OR     ENT SURGERY      tonsillectomy as a child     ESOPHAGOSCOPY, GASTROSCOPY, DUODENOSCOPY (EGD), COMBINED N/A 10/29/2018    Procedure: COMBINED ESOPHAGOSCOPY, GASTROSCOPY, DUODENOSCOPY (EGD) with biopsies and polypectomy;  Surgeon: Chente Bloom MD;  [facility]     INSERT EXTRACORPORAL MEMBRANE OXYGENATOR ADULT (OUTSIDE OR) N/A 2/27/2018    Procedure: INSERT EXTRACORPORAL MEMBRANE OXYGENATOR ADULT (OUTSIDE OR);  INSERT EXTRACORPORAL MEMBRANE OXYGENATOR ADULT (OUTSIDE OR) ;  Surgeon: Toby Hernandez MD;  [facility]     IR THORACENTESIS  9/13/2019     no prior surgery       REMOVE EXTRACORPORAL MEMBRANE OXYGENATOR ADULT N/A 3/3/2018    Procedure: REMOVE EXTRACORPORAL MEMBRANE OXYGENATOR ADULT;  Removal of Right Femoral Venous and Right Axillary Arterial Extracorporeal Membrane Oxygenator;  Surgeon: Toby Hernandez MD;  [facility]     TRANSPLANT LUNG RECIPIENT SINGLE X2 Bilateral 3/1/2018    Procedure: TRANSPLANT LUNG RECIPIENT SINGLE X2;  Median Sternotomy, Extracorporeal Membrane Oxygenator, bilateral sequential lung transplant;  Surgeon: Toby Hernandez MD;  Location:  OR       Family History   Problem Relation Age of Onset      "Hypertension Mother      Arthritis Mother      Pancreatic Cancer Father      Diabetes Father        Social History     Tobacco Use     Smoking status: Never Smoker     Smokeless tobacco: Never Used   Substance Use Topics     Alcohol use: No     Alcohol/week: 1.0 standard drinks     Types: 1 Glasses of wine per week         I have reviewed the Medications, Allergies, Past Medical and Surgical History, and Social History in the Epic system.    Review of Systems   Constitutional: Negative for chills and fever.   HENT: Negative for congestion and trouble swallowing.    Eyes: Negative for visual disturbance.   Respiratory: Positive for cough. Negative for shortness of breath and wheezing.    Cardiovascular: Positive for chest pain. Negative for palpitations and leg swelling.   Gastrointestinal: Positive for abdominal pain, diarrhea, nausea and vomiting.   Genitourinary: Positive for flank pain. Negative for dysuria.   Musculoskeletal: Negative for back pain and neck pain.   Neurological: Negative for weakness, light-headedness, numbness and headaches.   Hematological: Negative for adenopathy.   Psychiatric/Behavioral: Negative for confusion.       Physical Exam   BP: (!) 145/91  Pulse: 100  Temp: 98.7  F (37.1  C)  Resp: 12  Height: 162.6 cm (5' 4\")  Weight: 56.2 kg (124 lb)  SpO2: 100 %      Physical Exam  Vitals signs and nursing note reviewed.   Constitutional:       General: She is not in acute distress.     Comments: cushinoid   HENT:      Head: Normocephalic and atraumatic.      Right Ear: External ear normal.      Left Ear: External ear normal.      Nose: Nose normal.      Mouth/Throat:      Mouth: Mucous membranes are moist.   Eyes:      Extraocular Movements: Extraocular movements intact.      Conjunctiva/sclera: Conjunctivae normal.      Pupils: Pupils are equal, round, and reactive to light.   Neck:      Musculoskeletal: Normal range of motion and neck supple.   Cardiovascular:      Rate and Rhythm: Normal " rate and regular rhythm.      Heart sounds: No murmur.   Pulmonary:      Effort: Pulmonary effort is normal.      Breath sounds: Normal breath sounds.   Abdominal:      General: Abdomen is flat.      Tenderness: There is no tenderness.   Musculoskeletal:      Right lower leg: No edema.      Left lower leg: No edema.   Skin:     General: Skin is warm and dry.   Neurological:      General: No focal deficit present.      Mental Status: She is alert.      Cranial Nerves: No cranial nerve deficit.      Motor: No weakness.   Psychiatric:         Mood and Affect: Mood normal.         Behavior: Behavior normal.         ED Course        Procedures             EKG Interpretation:      Interpreted by VIVEK DAVIS MD  Time reviewed: 1623  Symptoms at time of EKG: chest pain   Rhythm: normal sinus   Rate: 100-110  Axis: Normal  Ectopy: premature atrial contraction  Conduction: normal  ST Segments/ T Waves: Non-specific ST-T wave changes  Q Waves: none  Comparison to prior: Unchanged from 06/06/19 other than PACs    Clinical Impression: non-specific EKG      Labs/Imaging    Results for orders placed or performed during the hospital encounter of 10/06/19 (from the past 24 hour(s))   CBC with platelets differential   Result Value Ref Range    WBC 9.4 4.0 - 11.0 10e9/L    RBC Count 3.43 (L) 3.8 - 5.2 10e12/L    Hemoglobin 9.2 (L) 11.7 - 15.7 g/dL    Hematocrit 31.6 (L) 35.0 - 47.0 %    MCV 92 78 - 100 fl    MCH 26.8 26.5 - 33.0 pg    MCHC 29.1 (L) 31.5 - 36.5 g/dL    RDW 16.9 (H) 10.0 - 15.0 %    Platelet Count 121 (L) 150 - 450 10e9/L    Diff Method Automated Method     % Neutrophils 85.4 %    % Lymphocytes 7.6 %    % Monocytes 5.9 %    % Eosinophils 0.2 %    % Basophils 0.2 %    % Immature Granulocytes 0.7 %    Nucleated RBCs 0 0 /100    Absolute Neutrophil 8.0 1.6 - 8.3 10e9/L    Absolute Lymphocytes 0.7 (L) 0.8 - 5.3 10e9/L    Absolute Monocytes 0.6 0.0 - 1.3 10e9/L    Absolute Eosinophils 0.0 0.0 - 0.7 10e9/L    Absolute  Basophils 0.0 0.0 - 0.2 10e9/L    Abs Immature Granulocytes 0.1 0 - 0.4 10e9/L    Absolute Nucleated RBC 0.0    Comprehensive metabolic panel   Result Value Ref Range    Sodium 134 133 - 144 mmol/L    Potassium 4.4 3.4 - 5.3 mmol/L    Chloride 102 94 - 109 mmol/L    Carbon Dioxide 21 20 - 32 mmol/L    Anion Gap 11 3 - 14 mmol/L    Glucose 95 70 - 99 mg/dL    Urea Nitrogen 21 7 - 30 mg/dL    Creatinine 1.71 (H) 0.52 - 1.04 mg/dL    GFR Estimate 33 (L) >60 mL/min/[1.73_m2]    GFR Estimate If Black 38 (L) >60 mL/min/[1.73_m2]    Calcium 9.3 8.5 - 10.1 mg/dL    Bilirubin Total 0.4 0.2 - 1.3 mg/dL    Albumin 3.0 (L) 3.4 - 5.0 g/dL    Protein Total 8.3 6.8 - 8.8 g/dL    Alkaline Phosphatase 279 (H) 40 - 150 U/L    ALT 22 0 - 50 U/L    AST 24 0 - 45 U/L   CRP inflammation   Result Value Ref Range    CRP Inflammation 25.0 (H) 0.0 - 8.0 mg/L   INR   Result Value Ref Range    INR 1.06 0.86 - 1.14   D dimer quantitative   Result Value Ref Range    D Dimer 2.7 (H) 0.0 - 0.50 ug/ml FEU   Lipase   Result Value Ref Range    Lipase 172 73 - 393 U/L   Troponin I   Result Value Ref Range    Troponin I ES <0.015 0.000 - 0.045 ug/L   EKG 12-lead, tracing only   Result Value Ref Range    Interpretation ECG Click View Image link to view waveform and result    XR Chest 2 Views    Narrative    Small right pleural effusion with hazy retrocardiac opacities favored  to represent atelectasis, less likely infection.          Assessments & Plan (with Medical Decision Making)   Impression:  Middle aged female with bilateral lung transplant and underlying rheumatologic syndrome. She presents with left flank pain radiating along the costal margin. She was recently found to have thick walled, ? Fluid containing pleural based mass in the left posteromedial lower chest relatively near the spine. Attempted aspiration under US guidance a few weeks ago was unsuccessful. Her pain is worsening, keeping her awake and leading to nausea. There was persistent  cystic lesion on chest CTA in [place] last week. Discussed with pulmonary staff. Will admit to internal medicine for further evaluation by pumonary, ID and thoracic services and for pain management (might benefit from regional pain services). Cause of the mass remains unclear. Infectious process such as abscess, empyema including atypical organisms and secondary malignancies such as lymphoma remain a concern.    I have reviewed the nursing notes.    I have reviewed the findings, diagnosis, plan and need for follow up with the patient.    New Prescriptions    No medications on file       Final diagnoses:   Left flank pain   Pleural mass   Lung replaced by transplant (H)       10/6/2019   [facility], [place], EMERGENCY DEPARTMENT     Fortunato Ricketts MD  10/06/19 [id number]

## 2019-10-06 NOTE — ED TRIAGE NOTES
"Triage Assessment & Note:    BP (!) 145/91   Pulse 100   Temp 98.7  F (37.1  C) (Oral)   Resp 12   Ht 1.626 m (5' 4\")   Wt 56.2 kg (124 lb)   LMP 06/07/2014 (Exact Date)   SpO2 100%   BMI 21.28 kg/m        Patient presents with: Lung tx pt comes to triage with family for reports of increase pain on the LL side. Pt reports that this has been going on for several weeks and that they are monitoring an infection in that area. Last CT done OSH. No reports of fever, cough, SOB, or CP.    Home Treatments/Remedies: Home medications    Febrile / Afebrile: afebrile    Duration of C/o: 1-2 mo    Elsy Carpenter RN  October 6, 2019        "

## 2019-10-07 LAB
ANION GAP SERPL CALCULATED.3IONS-SCNC: 8 MMOL/L (ref 3–14)
BUN SERPL-MCNC: 22 MG/DL (ref 7–30)
CALCIUM SERPL-MCNC: 8.9 MG/DL (ref 8.5–10.1)
CHLORIDE SERPL-SCNC: 104 MMOL/L (ref 94–109)
CMV DNA SPEC NAA+PROBE-ACNC: NORMAL [IU]/ML
CMV DNA SPEC NAA+PROBE-LOG#: NORMAL {LOG_IU}/ML
CO2 SERPL-SCNC: 22 MMOL/L (ref 20–32)
CREAT SERPL-MCNC: 1.69 MG/DL (ref 0.52–1.04)
ERYTHROCYTE [DISTWIDTH] IN BLOOD BY AUTOMATED COUNT: 17.2 % (ref 10–15)
ERYTHROCYTE [SEDIMENTATION RATE] IN BLOOD BY WESTERGREN METHOD: 93 MM/H (ref 0–30)
GFR SERPL CREATININE-BSD FRML MDRD: 33 ML/MIN/{1.73_M2}
GLUCOSE SERPL-MCNC: 69 MG/DL (ref 70–99)
HCT VFR BLD AUTO: 27.6 % (ref 35–47)
HGB BLD-MCNC: 8.2 G/DL (ref 11.7–15.7)
MCH RBC QN AUTO: 27.3 PG (ref 26.5–33)
MCHC RBC AUTO-ENTMCNC: 29.7 G/DL (ref 31.5–36.5)
MCV RBC AUTO: 92 FL (ref 78–100)
PLATELET # BLD AUTO: 96 10E9/L (ref 150–450)
POTASSIUM SERPL-SCNC: 4.9 MMOL/L (ref 3.4–5.3)
RBC # BLD AUTO: 3 10E12/L (ref 3.8–5.2)
SODIUM SERPL-SCNC: 134 MMOL/L (ref 133–144)
SPECIMEN SOURCE: NORMAL
TACROLIMUS BLD-MCNC: 6.1 UG/L (ref 5–15)
TME LAST DOSE: NORMAL H
WBC # BLD AUTO: 7.5 10E9/L (ref 4–11)

## 2019-10-07 PROCEDURE — 12000012 ZZH R&B MS OVERFLOW UMMC

## 2019-10-07 PROCEDURE — 36415 COLL VENOUS BLD VENIPUNCTURE: CPT | Performed by: INTERNAL MEDICINE

## 2019-10-07 PROCEDURE — 85652 RBC SED RATE AUTOMATED: CPT | Performed by: INTERNAL MEDICINE

## 2019-10-07 PROCEDURE — 87633 RESP VIRUS 12-25 TARGETS: CPT | Performed by: INTERNAL MEDICINE

## 2019-10-07 PROCEDURE — 36415 COLL VENOUS BLD VENIPUNCTURE: CPT | Performed by: PHYSICIAN ASSISTANT

## 2019-10-07 PROCEDURE — 40000893 ZZH STATISTIC PT IP EVAL DEFER

## 2019-10-07 PROCEDURE — 87449 NOS EACH ORGANISM AG IA: CPT | Performed by: INTERNAL MEDICINE

## 2019-10-07 PROCEDURE — 25000132 ZZH RX MED GY IP 250 OP 250 PS 637: Performed by: PHYSICIAN ASSISTANT

## 2019-10-07 PROCEDURE — 25000128 H RX IP 250 OP 636: Performed by: HOSPITALIST

## 2019-10-07 PROCEDURE — 25800030 ZZH RX IP 258 OP 636: Performed by: PHYSICIAN ASSISTANT

## 2019-10-07 PROCEDURE — 99233 SBSQ HOSP IP/OBS HIGH 50: CPT | Performed by: INTERNAL MEDICINE

## 2019-10-07 PROCEDURE — 87305 ASPERGILLUS AG IA: CPT | Performed by: INTERNAL MEDICINE

## 2019-10-07 PROCEDURE — 25000131 ZZH RX MED GY IP 250 OP 636 PS 637: Performed by: PHYSICIAN ASSISTANT

## 2019-10-07 PROCEDURE — 80197 ASSAY OF TACROLIMUS: CPT | Performed by: INTERNAL MEDICINE

## 2019-10-07 PROCEDURE — 80048 BASIC METABOLIC PNL TOTAL CA: CPT | Performed by: PHYSICIAN ASSISTANT

## 2019-10-07 PROCEDURE — 25000131 ZZH RX MED GY IP 250 OP 636 PS 637: Performed by: NURSE PRACTITIONER

## 2019-10-07 PROCEDURE — 85027 COMPLETE CBC AUTOMATED: CPT | Performed by: PHYSICIAN ASSISTANT

## 2019-10-07 RX ADMIN — PREDNISONE 5 MG: 5 TABLET ORAL at 08:33

## 2019-10-07 RX ADMIN — OXYCODONE HYDROCHLORIDE 10 MG: 5 TABLET ORAL at 22:10

## 2019-10-07 RX ADMIN — ONDANSETRON 4 MG: 2 INJECTION INTRAMUSCULAR; INTRAVENOUS at 15:44

## 2019-10-07 RX ADMIN — PREDNISONE 2.5 MG: 2.5 TABLET ORAL at 22:10

## 2019-10-07 RX ADMIN — FERROUS SULFATE TAB 325 MG (65 MG ELEMENTAL FE) 325 MG: 325 (65 FE) TAB at 08:35

## 2019-10-07 RX ADMIN — OXYCODONE HYDROCHLORIDE 5 MG: 5 TABLET ORAL at 06:53

## 2019-10-07 RX ADMIN — ONDANSETRON 4 MG: 2 INJECTION INTRAMUSCULAR; INTRAVENOUS at 08:35

## 2019-10-07 RX ADMIN — MULTIPLE VITAMINS W/ MINERALS TAB 1 TABLET: TAB at 08:34

## 2019-10-07 RX ADMIN — PANTOPRAZOLE SODIUM 40 MG: 40 TABLET, DELAYED RELEASE ORAL at 19:45

## 2019-10-07 RX ADMIN — SULFAMETHOXAZOLE AND TRIMETHOPRIM 1 TABLET: 400; 80 TABLET ORAL at 08:34

## 2019-10-07 RX ADMIN — PANTOPRAZOLE SODIUM 40 MG: 40 TABLET, DELAYED RELEASE ORAL at 08:34

## 2019-10-07 RX ADMIN — SODIUM CHLORIDE: 9 INJECTION, SOLUTION INTRAVENOUS at 19:44

## 2019-10-07 RX ADMIN — AMLODIPINE BESYLATE 10 MG: 10 TABLET ORAL at 08:33

## 2019-10-07 RX ADMIN — SENNOSIDES AND DOCUSATE SODIUM 2 TABLET: 8.6; 5 TABLET ORAL at 19:45

## 2019-10-07 RX ADMIN — TACROLIMUS 2.5 MG: 1 CAPSULE ORAL at 17:57

## 2019-10-07 RX ADMIN — TACROLIMUS 2.5 MG: 1 CAPSULE ORAL at 08:34

## 2019-10-07 RX ADMIN — Medication 1 TABLET: at 08:34

## 2019-10-07 RX ADMIN — METOPROLOL TARTRATE 50 MG: 25 TABLET ORAL at 08:34

## 2019-10-07 RX ADMIN — MAGNESIUM OXIDE TAB 400 MG (241.3 MG ELEMENTAL MG) 800 MG: 400 (241.3 MG) TAB at 08:35

## 2019-10-07 RX ADMIN — SODIUM CHLORIDE: 9 INJECTION, SOLUTION INTRAVENOUS at 09:48

## 2019-10-07 RX ADMIN — SENNOSIDES AND DOCUSATE SODIUM 2 TABLET: 8.6; 5 TABLET ORAL at 08:35

## 2019-10-07 RX ADMIN — METOPROLOL TARTRATE 50 MG: 25 TABLET ORAL at 19:45

## 2019-10-07 RX ADMIN — ONDANSETRON 4 MG: 2 INJECTION INTRAMUSCULAR; INTRAVENOUS at 22:09

## 2019-10-07 RX ADMIN — OXYCODONE HYDROCHLORIDE 10 MG: 5 TABLET ORAL at 11:29

## 2019-10-07 ASSESSMENT — ACTIVITIES OF DAILY LIVING (ADL)
ADLS_ACUITY_SCORE: 11

## 2019-10-07 ASSESSMENT — MIFFLIN-ST. JEOR: SCORE: 1103.89

## 2019-10-07 NOTE — PROGRESS NOTES
[facility], [place]    Medicine Progress Note - Hospitalist Service, Gold 10       Date of Admission:  10/6/2019  Assessment & Plan   Kecia Blue is a 58 year old female admitted on 10/6/2019. She has PMH of ILD s/p bilateral lung transplant (3/2018), anti-synthetase syndrome, dermatomyositis, pulmonary HTN, and chronic cough with recent admission 9/11-9/15/19 for LUUQ abdominal pain who presents to he ED for evaluation of LUQ abdominal pain. Patient admitted to Medicine for further evaluation and care.     Changes Today:  - [facility]ed [facility] file room looking for CT chest disc that was supposedly delivered from [facility] but cannot be located  - Spoke to file room staff at [facility] in [place] where CT was done -> will attempt to push image through again, check PACS (will not show up in imaging list, but will be in PACS when it is sent)  - Once CT chest images are in PACS -> will need to place order for radiology over-read  - Additional infectious workup ordered -> sputum culture, RVP, beta d glucan, aspergillus galactomannan  - Deferring any antibiotics or anti-fungals, consults at this time until imaging can be viewed by our radiologists and pulmonologists    # Acute on Chronic LUQ abdominal pain  # LLL empyema:   Presented with ~ 3 month hx of intermittent LUQ abdominal pain w/ radiation to the back. On recent admission, patient thought to have biliary source of pain, though MRCP revealed LLL empyema- confirmed by Chest CT. Thoracentesis attempt by IR unsuccessful. Etiology of empyema thought bacterial vs fungal vs atypical. ID consulted w/ plan to monitor patient clinically off of abx. (as small fluid collection and high surgical risk) and follow up with pulmonology on 10/10/19.  Blasto, crypto, coccidioides- negative. CRP on admission 25.0, VSS. LFT, Lipase wnl. No fever, chills, or elevated WBC count, pro-calcitonin negative    - Pulmonology  consult as above  - D-Dimer ordered in ED elevated at 2.7, bilateral LE doppler US negative, no tachycardia, hypoxia to suggest PE  - Pain and nausea control with Oxycodone, Zofran prn -> consider discontinuing Oxycodone tomorrow pending pain control  - Attempting to obtain imaging from [facility] for CT chest done 10/1 (see above)    - Additional infectious workup ordered 10/7 -> sputum culture, RVP, beta d glucan, aspergillus galactomannan  - Deferring any antibiotics or anti-fungals, consults at this time until imaging can be viewed by our radiologists and pulmonologists    # ILD s/p bilateral lung transplant:   No PTA O2 use. Current immunosuppression: Prednisone and tacrolimus. Bactrim prophylaxis.    - Continue PTA immunosuppression  - Tacro level drawn 10/7 am but inaccurate, repeat per pulm   - Pain control w/ Oxycodone 5-10 mg q4h PRN  - Zofran PRN for nausea     # CKD IV: Cr on admission 1.71. BL appears 1.0-1.5 until ~ 6/2019 when Cr began to slowly rise.   - Avoid dehydration, hypotension, nephrotoxic medicatons, trend BMP     # Normocytic Anemia:   Hgb 9.3 on admission. BL appears 8-9.     # Thrombocytopenia: Platelets 121 on admission, near baseline.   - Consider further w/u if continues to downtrend        # Dermatomyositis  # Seronegative RA:   No active treatments. No current symptoms.   - Monitor    # HTN: BP sable on admission. PTA Norvasc and metoprolol.   - Continue PTA medications. Hold parameters on metoprolol: SBP less than 100 or HR less than 60     # GERD: stable. PTA Protonix  - Continue      Diet: Combination Diet Regular Diet Adult    DVT Prophylaxis: Low Risk/Ambulatory with no VTE prophylaxis indicated  Basilio Catheter: not present  Code Status: Full Code      Disposition Plan   Expected discharge: 2 - 3 days, recommended to prior living arrangement once adequate pain management/ tolerating PO medications and plan for ?empyema established.  Entered: Kortney Lechuga MD  10/07/2019, 5:21 PM     The patient's care was discussed with the Patient, Patient's Family and Pulmonary Team.    Kortney Lechuga MD  Hospitalist Service, [address], [place]  Pager: [phone]  Please see sticky note for cross cover information  ______________________________________________________________________    Interval History   No acute events overnight. Continues to have persistent LUQ pain,d oes get some relief with oxycodone. Denies fevers, chills, incrased SOB, cough. Denies any symptoms of RA flare or stiff joints of muscles. Is frustrated that her pain is ongoing.     Data reviewed today: I reviewed all medications, new labs and imaging results over the last 24 hours.     Physical Exam   Vital Signs: Temp: 98  F (36.7  C) Temp src: Oral BP: (!) 153/94 Pulse: 78 Heart Rate: 74 Resp: 18 SpO2: 93 % O2 Device: None (Room air)    Weight: 116 lbs 9.6 oz  Physical Exam   Constitutional: Elderly female siting up in ED bed.   Well nourished, well developed, resting comfortably. Conversant. NAD.    HEENT: Normocephalic and atraumatic, conjunctivae are normal  Pharynx has no erythema or exudate, mucous membranes are moist, no LAD  Cardiovascular: Regular rate and rhythm without murmurs or gallops  Pulmonary/Chest: diminished with no wheezes or retractions. No respiratory distress.  GI: Soft with good bowel sounds. Mild LUQ TTP. non-distended, with no guarding, no rebound, no peritoneal signs.   Back:  No bony or CVA tenderness   Musculoskeletal:  No edema or clubbing   Skin: Skin is warm and dry. No rash noted to exposed skin areas.   Neurological: Alert and oriented to person, place, and time. Nonfocal exam  Psychiatric:  Normal mood and affect.    Data   Recent Labs   Lab 10/07/19  0717 10/06/19  1444   WBC 7.5 9.4   HGB 8.2* 9.2*   MCV 92 92   PLT 96* 121*   INR  --  1.06    134   POTASSIUM 4.9 4.4   CHLORIDE 104 102   CO2 22 21   BUN 22 21   CR 1.69* 1.71*    ANIONGAP 8 11   CHELSEY 8.9 9.3   GLC 69* 95   ALBUMIN  --  3.0*   PROTTOTAL  --  8.3   BILITOTAL  --  0.4   ALKPHOS  --  279*   ALT  --  22   AST  --  24   LIPASE  --  172   TROPI  --  <0.015     Recent Results (from the past 24 hour(s))   US Lower Extremity Venous Duplex Bilateral    Narrative    EXAMINATION: Bilateral lower extremity venous Doppler ultrasound.    COMPARISON: Left lower extremity venous ultrasound on 4/9/2018    HISTORY: Please eval for DVT    FINDINGS:   The right common femoral, femoral, popliteal, and posterior tibial  veins are fully compressible with patent Doppler wave forms. No  thrombus is identified within them on grayscale imaging.    The left common femoral, femoral, popliteal, and posterior tibial  veins are fully compressible with patent Doppler wave forms. No  thrombus is identified within them on grayscale imaging.      Impression    IMPRESSION:    No deep venous thrombus demonstrated in either lower extremity.    I have personally reviewed the examination and initial interpretation  and I agree with the findings.    YAMINI GUERIN MD     Medications     sodium chloride 100 mL/hr at 10/07/19 1546       amLODIPine  10 mg Oral Daily     calcium carbonate 600 mg-vitamin D 400 units  1 tablet Oral Daily     dronabinol  5 mg Oral BID AC     ferrous sulfate  325 mg Oral Daily with breakfast     gabapentin  300 mg Oral At Bedtime     influenza vaccine adult (product based on age)  0.5 mL Intramuscular Prior to discharge     magnesium oxide  800 mg Oral Daily     metoprolol tartrate  50 mg Oral BID     multivitamin w/minerals  1 tablet Oral Daily     pantoprazole  40 mg Oral BID     predniSONE  2.5 mg Oral At Bedtime     predniSONE  5 mg Oral QAM     senna-docusate  1 tablet Oral BID    Or     senna-docusate  2 tablet Oral BID     sodium chloride (PF)  3 mL Intracatheter Q8H     sulfamethoxazole-trimethoprim  1 tablet Oral Daily     tacrolimus  2.5 mg Oral BID IS

## 2019-10-07 NOTE — PLAN OF CARE
Pt admitted from ED around 2200. VSS, on RA. C/o left abdominal pain.  at bedside. NADEEM YOO'brendan. Admission profile completed, evening meds given. Order for MIVF, waiting for IV pump. Oxycodone for pain, zofran for nausea. Regular diet ordered.

## 2019-10-07 NOTE — PLAN OF CARE
"BP (!) 154/90 (BP Location: Right arm)   Pulse 91   Temp 97.8  F (36.6  C) (Oral)   Resp 16   Ht 1.626 m (5' 4\")   Wt 52.9 kg (116 lb 9.6 oz)   LMP 06/07/2014 (Exact Date)   SpO2 98%   BMI 20.01 kg/m      Patient hypertensive within parameters OVSS on RA, afebrile. No c/o pain; vomited x1 with nausea resolved after. Tolerating regular diet with poor appetite. L PIV infusing NS @ 100ml/h. Voided 400ml; LBM 10/6. Up ad diego. Plans for pulm consult tomorrow.   Will continue with POC and notify MD with changes or concerns.    "

## 2019-10-07 NOTE — PLAN OF CARE
7A PT deferred   Discharge Planner PT   Patient plan for discharge: Home with OP PT  Current status: PT orders acknowledged and appreciated. Pt independent with bed mobility, transfers and gait, has no concerns with safe discharge home. Will follow up with OP PT for chronic knee pain. No skilled IP PT needs identified, will complete orders.   Barriers to return to prior living situation: None per PT  Recommendations for discharge: Home with OP PT  Rationale for recommendations: See above.        Entered by: Yenni Coon 10/07/2019 8:51 AM

## 2019-10-07 NOTE — PLAN OF CARE
Defer  Discharge Planner OT   Patient plan for discharge: Home  Current status: Per chart review and discussion with PT, Pt is independent with all mobility and with no acute OT needs at this time.  Will complete OT orders  Barriers to return to prior living situation: No OT barriers  Recommendations for discharge: Home  Rationale for recommendations: Pt with no inpatient OT needs at this time       Entered by: Marlon Calix 10/07/2019 2:35 PM

## 2019-10-07 NOTE — H&P
[facility], [place]    History and Physical - Hospitalist Service, [facility]       Date of Admission:  10/6/2019    Assessment & Plan   Kecia Blue is a 58 year old female admitted on 10/6/2019. She has PMH of ILD s/p bilateral lung transplant (3/2018), anti-synthetase syndrome, dermatomyositis, pulmonary HTN, and chronic cough with recent admission 9/11-9/15/19 for LUUQ abdominal pain who presents to he ED for evaluation of LUQ abdominal pain. Patient admitted to Medicine for further evaluation and care.     # Acute on Chronic LUQ abdominal pain  # LLL empyema:   Presented with ~ 3 month hx of intermittent LUQ abdominal pain w/ radiation to the back. On recent admission, patient thought to have biliary source of pain, though MRCP revealed LLL empyema- confirmed by Chest CT. Thoracentesis attempt by IR unsuccessful. Etiology of empyema thought bacterial vs fungal vs atypical. ID consulted w/ plan to monitor patient clinically off of abx. (as small fluid collection and high surgical risk) and follow up with pulmonology on 10/10/19.  Blasto, crypto, coccidioides- negative. CRP on admission 25.0, VSS. LFT, Lipase wnl. No fever, chills, or elevated WBC count.     - Pulmonology consult as above  - Bilateral LE US as D-Dimer ordered in ED- 2.7. No tachycardia, hypoxia to suggest PE  - Add procal  - Pain and nausea control as above  - Further recommendations pending Pulmonology recommendations    # ILD s/p bilateral lung transplant: No PTA O2 use. Current immunosuppression: Prednisone and tacrolimus. Bactrim prophylaxis.    - Continue PTA immunosuppression  - tacrolimus tough level in am   - Pain control w/ Oxycodone 5-10 mg q4h PRN  - Zofran PRN for nausea   - Pulmonology consul placed. Please discuss with in am    # CKD IV: Cr on admission 1.71. BL appears 1.0-1.5 until ~ 6/2019 when Cr began to slowly rise.   - Avoid dehydration, hypotension, nephrotoxic medicatons   - BMP in  am     # Normocytic Anemia: Hgb 9.3 on admission. BL appears 8-9.   - CBC in am     # Thrombocytopenia: Platelets 121 on admission.   - CBC in am   - Further w/u if continues to downtrend        # Dermatomyositis  # Seronegative RA:   No active treatments. No current symptoms.   - Monitor    # HTN: BP sable on admission. PTA Norvasc and metoprolol.   - Continue PTA medications. Hold parameters on metoprolol: SBP less than 100 or HR less than 60   -   # GERD: stable. PTA Protonix  - Continue      Diet: Regular  DVT Prophylaxis: Pneumatic Compression Devices  Basilio Catheter: not present  Code Status: Full Code      Disposition Plan   Expected discharge: 2 - 3 days, recommended to prior living arrangement once Pulmonology consult w/ recommedations .  Entered: Debra Hitchcock PA-C 10/06/2019, 7:26 PM     The patient's care was discussed with the Attending Physician, Dr. davis.    Debra Hitchcock PA-C  Hospitalist Service, [facility]  Pager: [phone]    Pager: [phone]    Please see sticky note for cross cover information  ______________________________________________________________________    Chief Complaint   LUQ abdominal pain    History is obtained from the patient and EMR      History of Present Illness   Kecia Blue is a 58 year old female who presented to the ED for evaluation of a/c LUQ abdominal pain w/ radiation to he back. Described as cramping, consant, non-tender, non-pluritic. Pain is not associated w/ fever, chills, cough, N/V, LE swelling or calf tenderness. Pain is similar to previous episodes of LUQ abdominal pain. Attempt to alleviate sx unsuccessful, prompting ED evaluation.   Patient does not endorse current : headaches, changes in vision, chest pain, palpitations, upper respiratory symptoms of rhinorrhea or congestion, shortness of breath, cough, sputum production, wheezing,  nausea, emesis, constipation, diarrhea, dysuria,  edema, rashes, weakness, focal neurologic deficits, recent travel, illness, fever, chills.          Review of Systems    The 10 point Review of Systems is negative other than noted in the HPI or here.     Past Medical History    I have reviewed this patient's medical history and updated it with pertinent information if needed.   Past Medical History:   Diagnosis Date     Antisynthetase syndrome (H) 2014     Chronic cough      Chronic infection     recent C diff  8/18     Dehydration 8/1/2018     Dermatomyositis (H)      Dysplasia of cervix, low grade (ESTRADA 1)      ILD (interstitial lung disease) (H)      Osteopenia      PONV (postoperative nausea and vomiting)      Pulmonary hypertension (H)      Raynaud's disease      Seronegative rheumatoid arthritis (H)        Past Surgical History   I have reviewed this patient's surgical history and updated it with pertinent information if needed.  Past Surgical History:   Procedure Laterality Date     BRONCHOSCOPY (RIGID OR FLEXIBLE), DIAGNOSTIC N/A 4/10/2018    Procedure: COMBINED BRONCHOSCOPY (RIGID OR FLEXIBLE), LAVAGE;;  Surgeon: Mariposa Donohue MD;  Location:  GI     BRONCHOSCOPY (RIGID OR FLEXIBLE), DILATE BRONCHUS / TRACHEA N/A 10/11/2018    Procedure: BRONCHOSCOPY (RIGID OR FLEXIBLE), DILATE BRONCHUS / TRACHEA;  Flexible And Rigid Bronchoscopy and Dilation;  Surgeon: Wilber Lin MD;  Location:  OR     BRONCHOSCOPY FLEXIBLE N/A 3/13/2018    Procedure: BRONCHOSCOPY FLEXIBLE;  Flexible Bronchoscopy ;  Surgeon: Gissell Sanchez MD;  Location:  GI     BRONCHOSCOPY FLEXIBLE N/A 5/9/2018    Procedure: BRONCHOSCOPY FLEXIBLE;;  Surgeon: Wilber Lin MD;  Location:  GI     BRONCHOSCOPY FLEXIBLE AND RIGID N/A 9/10/2018    Procedure: BRONCHOSCOPY FLEXIBLE AND RIGID;  Flexible and Rigid Bronchoscopy with Balloon Dilation, tissue debulking with cryo, and Right mainstem bronchus stent placement;  Surgeon: Wilber Lin MD;  Location:   OR     BRONCHOSCOPY RIGID N/A 6/6/2018    Procedure: BRONCHOSCOPY RIGID;;  Surgeon: Lopez Macias MD;  Location: [facility]     BRONCHOSCOPY, DILATE BRONCHUS, STENT BRONCHUS, COMBINED N/A 6/11/2018    Procedure: COMBINED BRONCHOSCOPY, DILATE BRONCHUS, STENT BRONCHUS;  Flexible Bronchoscopy, Balloon Dilation, Bronchial Washings;  Surgeon: Wilber Lin MD;  Location: [facility] OR     BRONCHOSCOPY, DILATE BRONCHUS, STENT BRONCHUS, COMBINED Right 7/10/2018    Procedure: COMBINED BRONCHOSCOPY, DILATE BRONCHUS, STENT BRONCHUS;  Flexible Bronchoscopy, Balloon Dilation, Bronchial Washings  ;  Surgeon: Wilber Lin MD;  Location: [facility] OR     BRONCHOSCOPY, DILATE BRONCHUS, STENT BRONCHUS, COMBINED N/A 8/2/2018    Procedure: COMBINED BRONCHOSCOPY, DILATE BRONCHUS, STENT BRONCHUS;  Flexible Bronchoscopy, Bronchial Washings, Balloon Dilation;  Surgeon: Wilber Lin MD;  Location: [facility]     BRONCHOSCOPY, DILATE BRONCHUS, STENT BRONCHUS, COMBINED N/A 8/20/2018    Procedure: COMBINED BRONCHOSCOPY, DILATE BRONCHUS, STENT BRONCHUS;  Flexible Bronchoscopy, Balloon Dilation;  Surgeon: Wilber Lin MD;  Location: [facility]     BRONCHOSCOPY, DILATE BRONCHUS, STENT BRONCHUS, COMBINED N/A 10/29/2018    Procedure: Flexible Bronchoscopy, Balloon Dilation, Stent Revision, Airway Examination And Therapeutic Suctioning, Cyro Tumor Debulking;  Surgeon: Wilber Lin MD;  Location: [facility]     BRONCHOSCOPY, DILATE BRONCHUS, STENT BRONCHUS, COMBINED N/A 11/20/2018    Procedure: Rigid Bronchoscopy, Stent Removal and dilitation;  Surgeon: Wilber Lin MD;  Location: [facility]     BRONCHOSCOPY, DILATE BRONCHUS, STENT BRONCHUS, COMBINED N/A 12/14/2018    Procedure: Flexible And Rigid Bronchoscopy, Balloon Dilation, Bronchial Washing;  Surgeon: Wilber Lin MD;  Location: [facility]     BRONCHOSCOPY, DILATE BRONCHUS, STENT BRONCHUS, COMBINED N/A 1/17/2019    Procedure: Flexible And Rigid Bronchoscopy,  Balloon Dilation.;  Surgeon: Wilber Lin MD;  Location: [facility]     BRONCHOSCOPY, DILATE BRONCHUS, STENT BRONCHUS, COMBINED N/A 3/7/2019    Procedure: Flexible and Rigid Bronchoscopy, Bronchial Washing, Balloon Dilation;  Surgeon: Wilber Lin MD;  Location: [facility]     BRONCHOSCOPY, DILATE BRONCHUS, STENT BRONCHUS, COMBINED N/A 6/6/2019    Procedure: Rigid and Flexible Bronchoscopy, Balloon Dilation;  Surgeon: Wilber Lin MD;  Location: [facility] OR     ENT SURGERY      tonsillectomy as a child     ESOPHAGOSCOPY, GASTROSCOPY, DUODENOSCOPY (EGD), COMBINED N/A 10/29/2018    Procedure: COMBINED ESOPHAGOSCOPY, GASTROSCOPY, DUODENOSCOPY (EGD) with biopsies and polypectomy;  Surgeon: Chente Bloom MD;  Location: [facility]     INSERT EXTRACORPORAL MEMBRANE OXYGENATOR ADULT (OUTSIDE OR) N/A 2/27/2018    Procedure: INSERT EXTRACORPORAL MEMBRANE OXYGENATOR ADULT (OUTSIDE OR);  INSERT EXTRACORPORAL MEMBRANE OXYGENATOR ADULT (OUTSIDE OR) ;  Surgeon: Toby Hernandez MD;  Location: [facility]     IR THORACENTESIS  9/13/2019     no prior surgery       REMOVE EXTRACORPORAL MEMBRANE OXYGENATOR ADULT N/A 3/3/2018    Procedure: REMOVE EXTRACORPORAL MEMBRANE OXYGENATOR ADULT;  Removal of Right Femoral Venous and Right Axillary Arterial Extracorporeal Membrane Oxygenator;  Surgeon: Toby Hernandez MD;  Location: [facility]     TRANSPLANT LUNG RECIPIENT SINGLE X2 Bilateral 3/1/2018    Procedure: TRANSPLANT LUNG RECIPIENT SINGLE X2;  Median Sternotomy, Extracorporeal Membrane Oxygenator, bilateral sequential lung transplant;  Surgeon: Toby Hernandez MD;  Location: [facility]       Social History   I have reviewed this patient's social history and updated it with pertinent information if needed.  Social History     Tobacco Use     Smoking status: Never Smoker     Smokeless tobacco: Never Used   Substance Use Topics     Alcohol use: No     Alcohol/week: 1.0 standard drinks     Types: 1  Glasses of wine per week     Drug use: No       Family History   I have reviewed this patient's family history and updated it with pertinent information if needed.   Family History   Problem Relation Age of Onset     Hypertension Mother      Arthritis Mother      Pancreatic Cancer Father      Diabetes Father        Prior to Admission Medications   Prior to Admission Medications   Prescriptions Last Dose Informant Patient Reported? Taking?   acetaminophen (TYLENOL) 500 MG tablet Past Month at Unknown time  Yes Yes   Sig: Take 1,000 mg by mouth every 6 hours as needed for pain    amLODIPine (NORVASC) 10 MG tablet 10/6/2019 at Unknown time  No Yes   Sig: Take 1 tablet (10 mg) by mouth daily   calcium-vitamin D (CALTRATE) 600-400 MG-UNIT per tablet 10/6/2019 at Unknown time  Yes Yes   Sig: Take 1 tablet by mouth daily   dronabinol (MARINOL) 5 MG capsule Past Month at Unknown time  Yes Yes   Sig: Take 5 mg by mouth 2 times daily (before meals) as needed for nausea   ferrous sulfate (FEROSUL) 325 (65 Fe) MG tablet 10/6/2019 at Unknown time  No Yes   Sig: Take 1 tablet (325 mg) by mouth daily (with breakfast)   gabapentin (NEURONTIN) 300 MG capsule Past Month at Unknown time  No Yes   Sig: Take 1 pill every 8 hours as needed for pain (preferably at night time)   magnesium oxide (MAG-OX) 400 MG tablet 10/6/2019 at Unknown time  Yes Yes   Sig: Take 800 mg by mouth daily   metoprolol tartrate (LOPRESSOR) 25 MG tablet Past Month at Unknown time  No Yes   Sig: Take 2 tablets (50 mg) by mouth 2 times daily   multivitamin, therapeutic with minerals (THERA-VIT-M) TABS tablet Past Month at Unknown time  No Yes   Sig: Take 1 tablet by mouth daily   ondansetron (ZOFRAN) 4 MG tablet Past Month at Unknown time  No Yes   Sig: Take 1 tablet (4 mg) by mouth every 12 hours as needed for nausea   order for DME   No No   Sig: Equipment being ordered: Nebulizer   order for DME   No No   Sig: Equipment being ordered: Nebulizer   pantoprazole  (PROTONIX) 40 MG EC tablet 10/6/2019 at Unknown time  No Yes   Sig: Take 1 tablet (40 mg) by mouth 2 times daily   predniSONE (DELTASONE) 2.5 MG tablet 10/6/2019 at Unknown time  No Yes   Sig: Take two tablets (5mg) every AM and one tablet (2.5mg) every evening   senna-docusate (SENOKOT-S/PERICOLACE) 8.6-50 MG tablet 10/6/2019 at Unknown time  No Yes   Sig: Take 2 tablets by mouth 2 times daily as needed for constipation   sulfamethoxazole-trimethoprim (BACTRIM DS/SEPTRA DS) 800-160 MG tablet   No No   Sig: Take 1 tablet by mouth 2 times daily for 14 days   sulfamethoxazole-trimethoprim (BACTRIM/SEPTRA) 400-80 MG tablet 10/6/2019 at Unknown time  No Yes   Sig: Take 1 tablet by mouth daily   tacrolimus (GENERIC EQUIVALENT) 0.5 MG capsule 10/6/2019 at Unknown time  No Yes   Sig: Take 1 capsule (0.5 mg) by mouth every morning Total dose: 2.5 mg in the AM and 2 mg in the PM   tacrolimus (GENERIC EQUIVALENT) 1 MG capsule 10/6/2019 at Unknown time  No Yes   Sig: Take 2 capsules (2 mg) by mouth 2 times daily Total dose: 2.5 mg in the AM and 2 mg in the PM      Facility-Administered Medications: None     Allergies   No Known Allergies    Physical Exam   Vital Signs: Temp: 98.7  F (37.1  C) Temp src: Oral BP: (!) 160/96 Pulse: 92   Resp: 12 SpO2: 99 % O2 Device: None (Room air)    Weight: 124 lbs 0 oz      Physical Exam   Constitutional: Elderly female siting up in ED bed.   Well nourished, well developed, resting comfortably. Conversant. NAD.    HEENT:   Head: Normocephalic and atraumatic.   Eyes: Conjunctivae are normal. Pupils are equal, round, and reactive to light.  Pharynx has no erythema or exudate, mucous membranes are moist  Neck:   No adenopathy, no bony tenderness  Cardiovascular: Regular rate and rhythm without murmurs or gallops  Pulmonary/Chest: diminished with no wheezes or retractions. No respiratory distress.  GI: Soft with good bowel sounds. Mild LUQ TTP. non-distended, with no guarding, no rebound, no  peritoneal signs.   Back:  No bony or CVA tenderness   Musculoskeletal:  No edema or clubbing   Skin: Skin is warm and dry. No rash noted to exposed skin areas.   Neurological: Alert and oriented to person, place, and time. Nonfocal exam  Psychiatric:  Normal mood and affect.      Data   Data reviewed today: I reviewed all medications, new labs and imaging results over the last 24 hours. I personally reviewed recent imaging impressions, daily labs, progress notes.    Recent Labs   Lab 10/06/19  1444   WBC 9.4   HGB 9.2*   MCV 92   *   INR 1.06      POTASSIUM 4.4   CHLORIDE 102   CO2 21   BUN 21   CR 1.71*   ANIONGAP 11   CHELSEY 9.3   GLC 95   ALBUMIN 3.0*   PROTTOTAL 8.3   BILITOTAL 0.4   ALKPHOS 279*   ALT 22   AST 24   LIPASE 172   TROPI <0.015

## 2019-10-07 NOTE — PLAN OF CARE
"BP (!) 144/95 (BP Location: Right arm)   Pulse 91   Temp 98.6  F (37  C) (Oral)   Resp 18   Ht 1.626 m (5' 4\")   Wt 52.9 kg (116 lb 9.6 oz)   LMP 06/07/2014 (Exact Date)   SpO2 98%   BMI 20.01 kg/m    Saturating well on RA. LS diminished and crackles on left lower lobe. Kecia does endorse left upper abdominal pain. Oxycodone 10mg given. Also requested zofran IV prior to am meds, no other c/o nausea. Fair po intake. NS continues at 100cc/hour. RSV panel sent and pt placed in droplet precautions. Sputum culture ordered, pending collection. Pt coughing but usually only gets sputum in the morning. Pulmonary consulting.   "

## 2019-10-07 NOTE — CONSULTS
Pulmonary Medicine  Cystic Fibrosis - Lung Transplant Team  Initial Consultation  2019       Patient: Kecia Blue  MRN: [id number]  : 1960 (age 58 year old)  Transplant: 3/1/2018 (Lung), POD#[id number]  Admission date: 10/6/2019  Consulting provider: Kortney Lechuga MD  Reason for consultation: Immunosuppression management  Primary Care Provider: Rosy Vu    Assessment & Plan:     Kecia Blue is a 58 year old female who is S/P bilateral lung transplant 3/1/18 for ILD with Pulm HTN. Post operative course complicated by right main bronchial stenosis, positive DSAs, CMV viremia, leukopenia, C difficile and CMV viremia. Other history includes chronic cough, dermatomyositis, and seronegative RA. The patient was recently admitted on - from OSH for LUQ abdominal pain and found to have left lung empyema (thoracentesis attempt by IR unsuccessful) with plan to monitor clinically off antbx and follow with ID. Patient now admitted 10/6 with increased LUQ abdominal pain and nausea.    S/P bilateral lung transplant for ILD:  Last seen in pulm clinic  by Dr. Mcelroy. Most recent PFTs with FEV1 57% (). Last DSA () negative. Recent Steno maltophilia () on sputum culture, S/p 3 wk course PO ABX (Levaquin->Bactrim). Upon admission patient denies dyspnea, but baseline cough is with increased sputum production. No hypoxia, fever, or leukocytosis. Admission CXR (personally reviewed) with small right pleural effusion with hazy retrocardiac opacities likely atelectasis.  - RVP ordered, droplet isolation pending results  - Sputum culture and gram stain ordered    Immunosuppression:  - Tacrolimus 2.5 mg BID.  Goal level 8-10 (d/t CKD), level today 10/7 subtherapeutic at 6.1 (only 9 hr level) dose increased from from 2.5 mg qAM/ 2 mg qPM.  Next tacro level 10/10 (ordered)  - MMF on HOLD since 18 d/t CMV viremia and leukopenia. Per Dr. Mcelroy, plan is to restart when CMV  consistently negative.  - Prednisone 5 mg qAM/ 2.5 qPM     Prophylaxis:   - Bactrim daily    Acute on Chronic LUQ abdominal pain:  LLL empyema: Presented with ~ 3 month hx of intermittent LUQ abdominal pain, now with 1 week of increased pain that is constant with intermittent nausea. No fever, or chills. Recent admission MRCP was revealing for LLL empyema and confirmed by Chest CT. Thoracentesis (9/13) was unsuccessful. Possible VATS was discussed, but at that time risk was felt to outweigh benefit. ESR/CRP were significantly elevated on 9/13 with additional work up per ID revealing for positive galactomannan and fungitell (9/17). Plan was to monitor clinically off ABX/antifungals and follow closely as OP with ID. Etiology likely bacterial vs atypical bacterial vs fungal. Repeat ESR and CRP (10/6) improved but still elevated. CT Chest (10/1) radiology read from OSH reviewed in care everywhere with empyema size appears unchanged, but have not yet obtained images for review. D-dimer elevated on admission, however BLE US negative for DVT and no hemodynamic compromise or hypoxia to suggest PE.   - Repeat galactomannan and fungitell, ordered  - Defer antbx or antifungal treatment for now (patient is without symptoms of infection) pending ID consultation. Plan to consult ID once CT scan CD from OSH is obtained, primary team is working on this.    Right main bronchial stenosis s/p dilatation: Follow with Dr. Lin for serial bronchoscopies with dilation. Last bronch 6/6 with dilation to BI.    - Next bronchoscopy is scheduled for 10/11 with Dr. Lin, may need to reschedule    H/o CMV Viremia: CMV has been followed Q2 week as OP. CMV PCR has been <100 since 2/21. Valcyte was discontinued 7/25. CMV transiently elevated to 662 (8/20) and now again decreased. Most recent level 10/6 not detected.     Other relevent problems managed by primary team:    CKD stage IV: Creatinine stable on admission at 1.7. Baseline had been  1.1-1.3 but more recently (since 8/7) baseline increased to 1.3-1.7.  - Will continue to monitor tacrolimus levels  - Avoid nephrotoxins and renally dose medications.      We appreciate the excellent care provided by the [facility] team. Recommendations communicated via in person rounding and this note. Will continue to follow along closely, please do not hesitate to call with any questions or concerns.    Patient discussed with Dr. Fermin Ibarra, APRN, CNP   Inpatient Nurse Practitioner  Pulmonary CF/Transplant  Pager [phone]        Chief Complaint:     LUQ pain    History of Present Illness:     History obtained from patient.    Patient is S/P bilateral lung transplant 3/1/18 for ILD with Pulm HTN. Post operative course complicated by right main bronchial stenosis, positive DSAs, CMV viremia, leukopenia, C difficile and CMV viremia. Other history includes chronic cough, dermatomyositis, and seronegative RA. The patient was recently admitted on 9/11-9/19 from OSH for LUQ abdominal pain and found to have left lung empyema (thoracentesis attempt by IR unsuccessful) with plan to monitor clinically off antbx and follow with ID. Patient now admitted 10/6 with increased LUQ abdominal pain and nausea.    Presented with ~ 3 month hx of intermittent LUQ abdominal pain, now with 1 week of increased pain that is constant with intermittent nausea. No fever, or chills. Hemodynamically stable and afebrile. Breathing comfortably, no hypoxia. Denies dyspnea, but baseline cough is with increased sputum production (clear). No sinus symptoms. Exam with fine crackles bibasilar and CXR with small right pleural effusion with hazy retrocardiac opacities likely atelectasis. She reports her appetite is decreased recently with intermittent nausea and occasional vomiting. Reports normal BMs. Denies dysuria.      Review of Systems:     C: no fever, no chills, no change in weight, + change in appetite  INTEGUMENTARY/SKIN: no rash  or obvious new lesions  ENT/MOUTH: no sore throat, no sinus pain, no nasal congestion or drainage  RESP: see interval history  CV: no chest pain, no palpitations, no peripheral edema, no orthopnea  GI: + nausea, + vomiting, no change in stools, no reflux symptoms, + LUQ discomfort  : no dysuria  MUSCULOSKELETAL: no myalgias, no arthralgias  ENDOCRINE: blood sugars with adequate control  NEURO: no headache, no numbness or tingling  PSYCHIATRIC: mood stable    Medical and Surgical History:     Past Medical History:   Diagnosis Date     Antisynthetase syndrome (H) 2014     Chronic cough      Chronic infection     recent C diff  8/18     Dehydration 8/1/2018     Dermatomyositis (H)      Dysplasia of cervix, low grade (ESTRADA 1)      ILD (interstitial lung disease) (H)      Osteopenia      PONV (postoperative nausea and vomiting)      Pulmonary hypertension (H)      Raynaud's disease      Seronegative rheumatoid arthritis (H)      Past Surgical History:   Procedure Laterality Date     BRONCHOSCOPY (RIGID OR FLEXIBLE), DIAGNOSTIC N/A 4/10/2018    Procedure: COMBINED BRONCHOSCOPY (RIGID OR FLEXIBLE), LAVAGE;;  Surgeon: Mariposa Donohue MD;  Location: [facility]     BRONCHOSCOPY (RIGID OR FLEXIBLE), DILATE BRONCHUS / TRACHEA N/A 10/11/2018    Procedure: BRONCHOSCOPY (RIGID OR FLEXIBLE), DILATE BRONCHUS / TRACHEA;  Flexible And Rigid Bronchoscopy and Dilation;  Surgeon: Wilber Lin MD;  Location: [facility]     BRONCHOSCOPY FLEXIBLE N/A 3/13/2018    Procedure: BRONCHOSCOPY FLEXIBLE;  Flexible Bronchoscopy ;  Surgeon: Gissell Sanchez MD;  Location: [facility]     BRONCHOSCOPY FLEXIBLE N/A 5/9/2018    Procedure: BRONCHOSCOPY FLEXIBLE;;  Surgeon: Wilber Lin MD;  Location: [facility]     BRONCHOSCOPY FLEXIBLE AND RIGID N/A 9/10/2018    Procedure: BRONCHOSCOPY FLEXIBLE AND RIGID;  Flexible and Rigid Bronchoscopy with Balloon Dilation, tissue debulking with cryo, and Right mainstem bronchus stent placement;  Surgeon:  Wilber Lin MD;  Location: [facility]     BRONCHOSCOPY RIGID N/A 6/6/2018    Procedure: BRONCHOSCOPY RIGID;;  Surgeon: Lopez Macias MD;  Location: [facility]     BRONCHOSCOPY, DILATE BRONCHUS, STENT BRONCHUS, COMBINED N/A 6/11/2018    Procedure: COMBINED BRONCHOSCOPY, DILATE BRONCHUS, STENT BRONCHUS;  Flexible Bronchoscopy, Balloon Dilation, Bronchial Washings;  Surgeon: Wilber Lin MD;  Location: [facility]     BRONCHOSCOPY, DILATE BRONCHUS, STENT BRONCHUS, COMBINED Right 7/10/2018    Procedure: COMBINED BRONCHOSCOPY, DILATE BRONCHUS, STENT BRONCHUS;  Flexible Bronchoscopy, Balloon Dilation, Bronchial Washings  ;  Surgeon: Wilber Lin MD;  [facility]: [facility]     BRONCHOSCOPY, DILATE BRONCHUS, STENT BRONCHUS, COMBINED N/A 8/2/2018    Procedure: COMBINED BRONCHOSCOPY, DILATE BRONCHUS, STENT BRONCHUS;  Flexible Bronchoscopy, Bronchial Washings, Balloon Dilation;  Surgeon: Wilber Lin MD;  [facility]: [facility]     BRONCHOSCOPY, DILATE BRONCHUS, STENT BRONCHUS, COMBINED N/A 8/20/2018    Procedure: COMBINED BRONCHOSCOPY, DILATE BRONCHUS, STENT BRONCHUS;  Flexible Bronchoscopy, Balloon Dilation;  Surgeon: Wilber Lin MD;  [facility]: [facility]     BRONCHOSCOPY, DILATE BRONCHUS, STENT BRONCHUS, COMBINED N/A 10/29/2018    Procedure: Flexible Bronchoscopy, Balloon Dilation, Stent Revision, Airway Examination And Therapeutic Suctioning, Cyro Tumor Debulking;  Surgeon: Wilber Lin MD;  [facility]: [facility]     BRONCHOSCOPY, DILATE BRONCHUS, STENT BRONCHUS, COMBINED N/A 11/20/2018    Procedure: Rigid Bronchoscopy, Stent Removal and dilitation;  Surgeon: Wilber Lin MD;  Location: [facility]     BRONCHOSCOPY, DILATE BRONCHUS, STENT BRONCHUS, COMBINED N/A 12/14/2018    Procedure: Flexible And Rigid Bronchoscopy, Balloon Dilation, Bronchial Washing;  Surgeon: Wilber Lin MD;  Location: [facility]     BRONCHOSCOPY, DILATE BRONCHUS, STENT BRONCHUS, COMBINED N/A 1/17/2019     Procedure: Flexible And Rigid Bronchoscopy, Balloon Dilation.;  Surgeon: Wilber Lin MD;  [facility]: [facility]     BRONCHOSCOPY, DILATE BRONCHUS, STENT BRONCHUS, COMBINED N/A 3/7/2019    Procedure: Flexible and Rigid Bronchoscopy, Bronchial Washing, Balloon Dilation;  Surgeon: Wilber Lin MD;  [facility]     BRONCHOSCOPY, DILATE BRONCHUS, STENT BRONCHUS, COMBINED N/A 6/6/2019    Procedure: Rigid and Flexible Bronchoscopy, Balloon Dilation;  Surgeon: Wilber Lin MD;  Location: [facility]     ENT SURGERY      tonsillectomy as a child     ESOPHAGOSCOPY, GASTROSCOPY, DUODENOSCOPY (EGD), COMBINED N/A 10/29/2018    Procedure: COMBINED ESOPHAGOSCOPY, GASTROSCOPY, DUODENOSCOPY (EGD) with biopsies and polypectomy;  Surgeon: Chente Bloom MD;  [facility]     INSERT EXTRACORPORAL MEMBRANE OXYGENATOR ADULT (OUTSIDE OR) N/A 2/27/2018    Procedure: INSERT EXTRACORPORAL MEMBRANE OXYGENATOR ADULT (OUTSIDE OR);  INSERT EXTRACORPORAL MEMBRANE OXYGENATOR ADULT (OUTSIDE OR) ;  Surgeon: Toby Hernandez MD;  Location: [facility]     IR THORACENTESIS  9/13/2019     no prior surgery       REMOVE EXTRACORPORAL MEMBRANE OXYGENATOR ADULT N/A 3/3/2018    Procedure: REMOVE EXTRACORPORAL MEMBRANE OXYGENATOR ADULT;  Removal of Right Femoral Venous and Right Axillary Arterial Extracorporeal Membrane Oxygenator;  Surgeon: Toby Hernandez MD;  Location: [facility]     TRANSPLANT LUNG RECIPIENT SINGLE X2 Bilateral 3/1/2018    Procedure: TRANSPLANT LUNG RECIPIENT SINGLE X2;  Median Sternotomy, Extracorporeal Membrane Oxygenator, bilateral sequential lung transplant;  Surgeon: Toby Hernandez MD;  Location: [facility]     Social and Family History:     Social History     Socioeconomic History     Marital status:      Spouse name: Not on file     Number of children: Not on file     Years of education: Not on file     Highest education level: Not on file   Occupational History     Not  on file   Social Needs     Financial resource strain: Not on file     Food insecurity:     Worry: Not on file     Inability: Not on file     Transportation needs:     Medical: Not on file     Non-medical: Not on file   Tobacco Use     Smoking status: Never Smoker     Smokeless tobacco: Never Used   Substance and Sexual Activity     Alcohol use: No     Alcohol/week: 1.0 standard drinks     Types: 1 Glasses of wine per week     Drug use: No     Sexual activity: Not on file   Lifestyle     Physical activity:     Days per week: Not on file     Minutes per session: Not on file     Stress: Not on file   Relationships     Social connections:     Talks on phone: Not on file     Gets together: Not on file     Attends Restorationism service: Not on file     Active member of club or organization: Not on file     Attends meetings of clubs or organizations: Not on file     Relationship status: Not on file     Intimate partner violence:     Fear of current or ex partner: Not on file     Emotionally abused: Not on file     Physically abused: Not on file     Forced sexual activity: Not on file   Other Topics Concern     Parent/sibling w/ CABG, MI or angioplasty before 65F 55M? No   Social History Narrative    3/6/2019 - Lives with . Has three daughters. Four grandchildren (two ). No pets. Travelled previously to [facility]. Has visited [place] several times.      Family History   Problem Relation Age of Onset     Hypertension Mother      Arthritis Mother      Pancreatic Cancer Father      Diabetes Father      Allergies and Home Medications:   No Known Allergies  Medications Prior to Admission   Medication Sig Dispense Refill Last Dose     acetaminophen (TYLENOL) 500 MG tablet Take 1,000 mg by mouth every 6 hours as needed for pain    Past Month at Unknown time     amLODIPine (NORVASC) 10 MG tablet Take 1 tablet (10 mg) by mouth daily 30 tablet 11 10/6/2019 at Unknown time     calcium-vitamin D (CALTRATE) 600-400  MG-UNIT per tablet Take 1 tablet by mouth daily   10/6/2019 at Unknown time     dronabinol (MARINOL) 5 MG capsule Take 5 mg by mouth 2 times daily (before meals) as needed for nausea   Past Month at Unknown time     ferrous sulfate (FEROSUL) 325 (65 Fe) MG tablet Take 1 tablet (325 mg) by mouth daily (with breakfast) 60 tablet 2 10/6/2019 at Unknown time     gabapentin (NEURONTIN) 300 MG capsule Take 1 pill every 8 hours as needed for pain (preferably at night time) 60 capsule 0 Past Month at Unknown time     magnesium oxide (MAG-OX) 400 MG tablet Take 800 mg by mouth daily   10/6/2019 at Unknown time     metoprolol tartrate (LOPRESSOR) 25 MG tablet Take 2 tablets (50 mg) by mouth 2 times daily 60 tablet 11 Past Month at Unknown time     multivitamin, therapeutic with minerals (THERA-VIT-M) TABS tablet Take 1 tablet by mouth daily 30 each 11 Past Month at Unknown time     ondansetron (ZOFRAN) 4 MG tablet Take 1 tablet (4 mg) by mouth every 12 hours as needed for nausea 60 tablet 0 Past Month at Unknown time     pantoprazole (PROTONIX) 40 MG EC tablet Take 1 tablet (40 mg) by mouth 2 times daily 60 tablet 11 10/6/2019 at Unknown time     predniSONE (DELTASONE) 2.5 MG tablet Take two tablets (5mg) every AM and one tablet (2.5mg) every evening 90 tablet 11 10/6/2019 at Unknown time     senna-docusate (SENOKOT-S/PERICOLACE) 8.6-50 MG tablet Take 2 tablets by mouth 2 times daily as needed for constipation 60 tablet 0 10/6/2019 at Unknown time     sulfamethoxazole-trimethoprim (BACTRIM/SEPTRA) 400-80 MG tablet Take 1 tablet by mouth daily 90 tablet 3 10/6/2019 at Unknown time     tacrolimus (GENERIC EQUIVALENT) 0.5 MG capsule Take 1 capsule (0.5 mg) by mouth every morning Total dose: 2.5 mg in the AM and 2 mg in the PM 90 capsule 3 10/6/2019 at Unknown time     tacrolimus (GENERIC EQUIVALENT) 1 MG capsule Take 2 capsules (2 mg) by mouth 2 times daily Total dose: 2.5 mg in the AM and 2 mg in the  capsule 3 10/6/2019  "at Unknown time     order for DME Equipment being ordered: Nebulizer 1 Device 1 Taking     order for DME Equipment being ordered: Nebulizer 1 Device 1 Taking     [] sulfamethoxazole-trimethoprim (BACTRIM DS/SEPTRA DS) 800-160 MG tablet Take 1 tablet by mouth 2 times daily for 14 days 28 tablet 0      Current Scheduled Meds    amLODIPine  10 mg Oral Daily     calcium carbonate 600 mg-vitamin D 400 units  1 tablet Oral Daily     dronabinol  5 mg Oral BID AC     ferrous sulfate  325 mg Oral Daily with breakfast     gabapentin  300 mg Oral At Bedtime     influenza vaccine adult (product based on age)  0.5 mL Intramuscular Prior to discharge     magnesium oxide  800 mg Oral Daily     metoprolol tartrate  50 mg Oral BID     multivitamin w/minerals  1 tablet Oral Daily     pantoprazole  40 mg Oral BID     predniSONE  2.5 mg Oral At Bedtime     predniSONE  5 mg Oral QAM     senna-docusate  1 tablet Oral BID    Or     senna-docusate  2 tablet Oral BID     sodium chloride (PF)  3 mL Intracatheter Q8H     sulfamethoxazole-trimethoprim  1 tablet Oral Daily     tacrolimus  2 mg Oral QPM     tacrolimus  2.5 mg Oral QAM      Current PRN Meds  lidocaine 4%, lidocaine (buffered or not buffered), melatonin, metoclopramide **OR** metoclopramide, naloxone, ondansetron **OR** ondansetron, oxyCODONE, polyethylene glycol, prochlorperazine **OR** prochlorperazine **OR** prochlorperazine, senna-docusate, sodium chloride (PF)     Physical Exam:     Vital signs:  Temp: 98  F (36.7  C) Temp src: Oral BP: (!) 153/94 Pulse: 78 Heart Rate: 74 Resp: 18 SpO2: 93 % O2 Device: None (Room air)   Height: 162.6 cm (5' 4\") Weight: 52.9 kg (116 lb 9.6 oz)  I/O:     Intake/Output Summary (Last 24 hours) at 10/7/2019 1602  Last data filed at 10/7/2019 1546  Gross per 24 hour   Intake 1873.33 ml   Output 925 ml   Net 948.33 ml     Constitutional: awake, sitting in bed, in no apparent distress.   HEENT: Eyes with pink conjunctivae, anicteric. Oral " mucosa moist without lesions. Neck supple without lymphadenopathy.   PULM: Good air flow bilaterally. Fine crackles to bases bilaterally, no rhonchi, no wheezes. Non-labored breathing on RA.  CV: Normal S1 and S2. RRR. No murmur, gallop, or rub. No peripheral edema.   ABD: NABS, soft, Mildly tender to LUQ, nondistended.    MSK: Moves all extremities. No apparent muscle wasting.   NEURO: Alert and oriented, conversant.   SKIN: Warm, dry. No rash on limited exam.   PSYCH: Mood stable.?     Lines, Drains, and Devices:  Peripheral IV 10/06/19 Left (Active)   Site Assessment WDL 10/7/2019 10:00 AM   Line Status Saline locked 10/7/2019 10:00 AM   Phlebitis Scale 0-->no symptoms 10/7/2019 10:00 AM   Infiltration Scale 0 10/7/2019 12:00 AM   Extravasation? No 10/7/2019 12:00 AM   Number of days: 1     Results:     LABS    CMP:   Recent Labs   Lab 10/07/19  0717 10/06/19  1444    134   POTASSIUM 4.9 4.4   CHLORIDE 104 102   CO2 22 21   ANIONGAP 8 11   GLC 69* 95   BUN 22 21   CR 1.69* 1.71*   GFRESTIMATED 33* 33*   GFRESTBLACK 38* 38*   CHELSEY 8.9 9.3   MAG  --  2.0   PHOS  --  3.9   PROTTOTAL  --  8.3   ALBUMIN  --  3.0*   BILITOTAL  --  0.4   ALKPHOS  --  279*   AST  --  24   ALT  --  22     CBC:   Recent Labs   Lab 10/07/19  0717 10/06/19  1444   WBC 7.5 9.4   RBC 3.00* 3.43*   HGB 8.2* 9.2*   HCT 27.6* 31.6*   MCV 92 92   MCH 27.3 26.8   MCHC 29.7* 29.1*   RDW 17.2* 16.9*   PLT 96* 121*       INR:   Recent Labs   Lab 10/06/19  1444   INR 1.06       Glucose:   Recent Labs   Lab 10/07/19  0717 10/06/19  1444   GLC 69* 95       Blood Gas: No lab results found in last 7 days.    Culture Data No results for input(s): CULT in the last 168 hours.    Virology Data:   Lab Results   Component Value Date    FLUAH1 Negative 03/07/2019    FLUAH3 Negative 03/07/2019    [id number] Negative 03/07/2019    IFLUB Negative 03/07/2019    RSVA Negative 03/07/2019    RSVB Negative 03/07/2019    PIV1 Negative 03/07/2019    PIV2 Negative  03/07/2019    PIV3 Negative 03/07/2019    HMPV Negative 03/07/2019    HRVS Positive (A) 03/07/2019    ADVBE Negative 03/07/2019    ADVC Negative 03/07/2019    ADVC Negative 01/17/2019    ADVC Negative 12/14/2018       Historical CMV results (last 3 of prior testing):  Lab Results   Component Value Date    CMVQNT CMV DNA Not Detected 10/06/2019    CMVQNT <137 (A) 09/12/2019    CMVQNT <137 (A) 06/05/2019     Lab Results   Component Value Date    CMVLOG Not Calculated 10/06/2019    CMVLOG <2.1 09/12/2019    CMVLOG <2.1 06/05/2019       Urine Studies    Recent Labs   Lab Test 09/12/19  0125 08/07/19  1512   URINEPH 7.5* 7.0   NITRITE Negative Negative   LEUKEST Negative Trace*   WBCU 3 19*       Most Recent [facility] Pulmonary Function Testing (FVC/FEV1 only)  FVC-Pre   Date Value Ref Range Status   08/07/2019 2.22 L    06/05/2019 2.26 L    03/05/2019 1.97 L    01/16/2019 1.92 L      FVC-%Pred-Pre   Date Value Ref Range Status   08/07/2019 67 %    06/05/2019 70 %    03/05/2019 60 %    01/16/2019 59 %      FEV1-Pre   Date Value Ref Range Status   08/07/2019 1.60 L    06/05/2019 1.65 L    03/05/2019 1.31 L    01/16/2019 1.04 L      FEV1-%Pred-Pre   Date Value Ref Range Status   08/07/2019 61 %    06/05/2019 64 %    03/05/2019 51 %    01/16/2019 40 %        IMAGING    Recent Results (from the past 48 hour(s))   XR Chest 2 Views    Narrative    Exam:  XR CHEST 2 VW, 10/6/2019 4:58 PM    History: chest pain    Comparison:  CT chest 9/12/2019, chest radiograph 9/12/2019    Findings:  PA and lateral views of the chest. Sternotomy wires and  mediastinal surgical clips. Cardiac silhouette is stable. Small right  pleural effusion. No pneumothorax. Hazy retrocardiac opacities.  Visualized upper abdomen is unremarkable.      Impression    Impression:    Small right pleural effusion with hazy retrocardiac opacities favored  to represent atelectasis, less likely infection.     I have personally reviewed the examination and initial  interpretation  and I agree with the findings.    MEHNAZ CHAPMAN MD   US Lower Extremity Venous Duplex Bilateral    Narrative    EXAMINATION: Bilateral lower extremity venous Doppler ultrasound.    COMPARISON: Left lower extremity venous ultrasound on 4/9/2018    HISTORY: Please eval for DVT    FINDINGS:   The right common femoral, femoral, popliteal, and posterior tibial  veins are fully compressible with patent Doppler wave forms. No  thrombus is identified within them on grayscale imaging.    The left common femoral, femoral, popliteal, and posterior tibial  veins are fully compressible with patent Doppler wave forms. No  thrombus is identified within them on grayscale imaging.      Impression    IMPRESSION:    No deep venous thrombus demonstrated in either lower extremity.    I have personally reviewed the examination and initial interpretation  and I agree with the findings.    YAMINI GUERIN MD

## 2019-10-08 ENCOUNTER — APPOINTMENT (OUTPATIENT)
Dept: GENERAL RADIOLOGY | Facility: CLINIC | Age: 59
DRG: 205 | End: 2019-10-08
Attending: INTERNAL MEDICINE
Payer: COMMERCIAL

## 2019-10-08 ENCOUNTER — APPOINTMENT (OUTPATIENT)
Dept: INTERVENTIONAL RADIOLOGY/VASCULAR | Facility: CLINIC | Age: 59
DRG: 205 | End: 2019-10-08
Attending: RADIOLOGY
Payer: COMMERCIAL

## 2019-10-08 LAB
ANION GAP SERPL CALCULATED.3IONS-SCNC: 8 MMOL/L (ref 3–14)
BUN SERPL-MCNC: 20 MG/DL (ref 7–30)
CALCIUM SERPL-MCNC: 9 MG/DL (ref 8.5–10.1)
CHLORIDE SERPL-SCNC: 107 MMOL/L (ref 94–109)
CO2 SERPL-SCNC: 22 MMOL/L (ref 20–32)
CREAT SERPL-MCNC: 1.41 MG/DL (ref 0.52–1.04)
ERYTHROCYTE [DISTWIDTH] IN BLOOD BY AUTOMATED COUNT: 17.1 % (ref 10–15)
FLUAV H1 2009 PAND RNA SPEC QL NAA+PROBE: NEGATIVE
FLUAV H1 RNA SPEC QL NAA+PROBE: NEGATIVE
FLUAV H3 RNA SPEC QL NAA+PROBE: NEGATIVE
FLUAV RNA SPEC QL NAA+PROBE: NEGATIVE
FLUBV RNA SPEC QL NAA+PROBE: NEGATIVE
GFR SERPL CREATININE-BSD FRML MDRD: 41 ML/MIN/{1.73_M2}
GLUCOSE SERPL-MCNC: 86 MG/DL (ref 70–99)
GRAM STN SPEC: NORMAL
HADV DNA SPEC QL NAA+PROBE: NEGATIVE
HADV DNA SPEC QL NAA+PROBE: NEGATIVE
HCT VFR BLD AUTO: 30.6 % (ref 35–47)
HGB BLD-MCNC: 8.9 G/DL (ref 11.7–15.7)
HMPV RNA SPEC QL NAA+PROBE: NEGATIVE
HPIV1 RNA SPEC QL NAA+PROBE: NEGATIVE
HPIV2 RNA SPEC QL NAA+PROBE: NEGATIVE
HPIV3 RNA SPEC QL NAA+PROBE: NEGATIVE
Lab: NORMAL
MCH RBC QN AUTO: 27.2 PG (ref 26.5–33)
MCHC RBC AUTO-ENTMCNC: 29.1 G/DL (ref 31.5–36.5)
MCV RBC AUTO: 94 FL (ref 78–100)
MICROBIOLOGIST REVIEW: NORMAL
PLATELET # BLD AUTO: 109 10E9/L (ref 150–450)
POTASSIUM SERPL-SCNC: 5 MMOL/L (ref 3.4–5.3)
RBC # BLD AUTO: 3.27 10E12/L (ref 3.8–5.2)
RHINOVIRUS RNA SPEC QL NAA+PROBE: NEGATIVE
RSV RNA SPEC QL NAA+PROBE: NEGATIVE
RSV RNA SPEC QL NAA+PROBE: NEGATIVE
SODIUM SERPL-SCNC: 136 MMOL/L (ref 133–144)
SPECIMEN SOURCE: NORMAL
SPECIMEN SOURCE: NORMAL
WBC # BLD AUTO: 9.1 10E9/L (ref 4–11)

## 2019-10-08 PROCEDURE — 25000125 ZZHC RX 250: Performed by: STUDENT IN AN ORGANIZED HEALTH CARE EDUCATION/TRAINING PROGRAM

## 2019-10-08 PROCEDURE — 25000131 ZZH RX MED GY IP 250 OP 636 PS 637: Performed by: PHYSICIAN ASSISTANT

## 2019-10-08 PROCEDURE — 27210909 ZZH NEEDLE CR5

## 2019-10-08 PROCEDURE — 99232 SBSQ HOSP IP/OBS MODERATE 35: CPT | Performed by: INTERNAL MEDICINE

## 2019-10-08 PROCEDURE — 77012 CT SCAN FOR NEEDLE BIOPSY: CPT

## 2019-10-08 PROCEDURE — 85027 COMPLETE CBC AUTOMATED: CPT | Performed by: INTERNAL MEDICINE

## 2019-10-08 PROCEDURE — 87186 SC STD MICRODIL/AGAR DIL: CPT | Performed by: INTERNAL MEDICINE

## 2019-10-08 PROCEDURE — 0W9B3ZZ DRAINAGE OF LEFT PLEURAL CAVITY, PERCUTANEOUS APPROACH: ICD-10-PCS | Performed by: RADIOLOGY

## 2019-10-08 PROCEDURE — 87107 FUNGI IDENTIFICATION MOLD: CPT | Performed by: INTERNAL MEDICINE

## 2019-10-08 PROCEDURE — 25000132 ZZH RX MED GY IP 250 OP 250 PS 637: Performed by: PHYSICIAN ASSISTANT

## 2019-10-08 PROCEDURE — 80048 BASIC METABOLIC PNL TOTAL CA: CPT | Performed by: INTERNAL MEDICINE

## 2019-10-08 PROCEDURE — 87070 CULTURE OTHR SPECIMN AEROBIC: CPT | Performed by: INTERNAL MEDICINE

## 2019-10-08 PROCEDURE — 10160 PNXR ASPIR ABSC HMTMA BULLA: CPT

## 2019-10-08 PROCEDURE — 25000131 ZZH RX MED GY IP 250 OP 636 PS 637: Performed by: NURSE PRACTITIONER

## 2019-10-08 PROCEDURE — 25000128 H RX IP 250 OP 636: Performed by: HOSPITALIST

## 2019-10-08 PROCEDURE — 87205 SMEAR GRAM STAIN: CPT | Performed by: INTERNAL MEDICINE

## 2019-10-08 PROCEDURE — 87015 SPECIMEN INFECT AGNT CONCNTJ: CPT | Performed by: INTERNAL MEDICINE

## 2019-10-08 PROCEDURE — 87206 SMEAR FLUORESCENT/ACID STAI: CPT | Performed by: INTERNAL MEDICINE

## 2019-10-08 PROCEDURE — 12000012 ZZH R&B MS OVERFLOW UMMC

## 2019-10-08 PROCEDURE — 87102 FUNGUS ISOLATION CULTURE: CPT | Performed by: INTERNAL MEDICINE

## 2019-10-08 PROCEDURE — 40000135 CT MHEALTH OVERREAD

## 2019-10-08 PROCEDURE — 87116 MYCOBACTERIA CULTURE: CPT | Performed by: INTERNAL MEDICINE

## 2019-10-08 PROCEDURE — 25000132 ZZH RX MED GY IP 250 OP 250 PS 637: Performed by: INTERNAL MEDICINE

## 2019-10-08 PROCEDURE — 25800030 ZZH RX IP 258 OP 636: Performed by: PHYSICIAN ASSISTANT

## 2019-10-08 PROCEDURE — 36415 COLL VENOUS BLD VENIPUNCTURE: CPT | Performed by: INTERNAL MEDICINE

## 2019-10-08 RX ORDER — MIRTAZAPINE 15 MG/1
15 TABLET, ORALLY DISINTEGRATING ORAL AT BEDTIME
Status: DISCONTINUED | OUTPATIENT
Start: 2019-10-08 | End: 2019-10-12 | Stop reason: HOSPADM

## 2019-10-08 RX ADMIN — METOPROLOL TARTRATE 50 MG: 25 TABLET ORAL at 08:59

## 2019-10-08 RX ADMIN — ONDANSETRON 4 MG: 2 INJECTION INTRAMUSCULAR; INTRAVENOUS at 22:39

## 2019-10-08 RX ADMIN — TACROLIMUS 2.5 MG: 1 CAPSULE ORAL at 19:20

## 2019-10-08 RX ADMIN — MULTIPLE VITAMINS W/ MINERALS TAB 1 TABLET: TAB at 08:59

## 2019-10-08 RX ADMIN — FERROUS SULFATE TAB 325 MG (65 MG ELEMENTAL FE) 325 MG: 325 (65 FE) TAB at 08:59

## 2019-10-08 RX ADMIN — SULFAMETHOXAZOLE AND TRIMETHOPRIM 1 TABLET: 400; 80 TABLET ORAL at 08:59

## 2019-10-08 RX ADMIN — PREDNISONE 2.5 MG: 2.5 TABLET ORAL at 21:52

## 2019-10-08 RX ADMIN — SENNOSIDES AND DOCUSATE SODIUM 2 TABLET: 8.6; 5 TABLET ORAL at 19:21

## 2019-10-08 RX ADMIN — Medication 1 TABLET: at 08:59

## 2019-10-08 RX ADMIN — METOPROLOL TARTRATE 50 MG: 25 TABLET ORAL at 19:21

## 2019-10-08 RX ADMIN — TACROLIMUS 2.5 MG: 1 CAPSULE ORAL at 08:59

## 2019-10-08 RX ADMIN — SENNOSIDES AND DOCUSATE SODIUM 1 TABLET: 8.6; 5 TABLET ORAL at 08:58

## 2019-10-08 RX ADMIN — SODIUM CHLORIDE: 9 INJECTION, SOLUTION INTRAVENOUS at 03:27

## 2019-10-08 RX ADMIN — OXYCODONE HYDROCHLORIDE 10 MG: 5 TABLET ORAL at 22:39

## 2019-10-08 RX ADMIN — LIDOCAINE HYDROCHLORIDE 5 ML: 10 INJECTION, SOLUTION EPIDURAL; INFILTRATION; INTRACAUDAL; PERINEURAL at 17:27

## 2019-10-08 RX ADMIN — PANTOPRAZOLE SODIUM 40 MG: 40 TABLET, DELAYED RELEASE ORAL at 19:20

## 2019-10-08 RX ADMIN — PANTOPRAZOLE SODIUM 40 MG: 40 TABLET, DELAYED RELEASE ORAL at 08:59

## 2019-10-08 RX ADMIN — AMLODIPINE BESYLATE 10 MG: 10 TABLET ORAL at 08:59

## 2019-10-08 RX ADMIN — PREDNISONE 5 MG: 5 TABLET ORAL at 08:58

## 2019-10-08 RX ADMIN — MAGNESIUM OXIDE TAB 400 MG (241.3 MG ELEMENTAL MG) 800 MG: 400 (241.3 MG) TAB at 08:58

## 2019-10-08 ASSESSMENT — ACTIVITIES OF DAILY LIVING (ADL)
ADLS_ACUITY_SCORE: 11

## 2019-10-08 ASSESSMENT — MIFFLIN-ST. JEOR: SCORE: 1120.68

## 2019-10-08 ASSESSMENT — PAIN DESCRIPTION - DESCRIPTORS: DESCRIPTORS: ACHING

## 2019-10-08 NOTE — PLAN OF CARE
BP running high, 150's-160's/90's. Afeb. OVSS. Continues to have abdominal pain and received prn Oxycodone 10 mg x 1. Prn Compazine 10 mg po given with Oxycodone as the pt thinks that the Oxycodone makes her nauseated. Voided x 1 and no BM.

## 2019-10-08 NOTE — PROGRESS NOTES
Pulmonary Medicine  Cystic Fibrosis - Lung Transplant Team  Progress Note  2019       Patient: Kecia Blue  MRN: [id number]  : 1960 (age 58 year old)  Transplant: 3/1/2018 (Lung), POD#586  Admission date: 10/6/2019    Assessment & Plan:     Kecia Blue is a 58 year old female with a PMH of ILD s/p BSLT (3/2018), anti-synthetase syndrome, dermatomyositis, and pulmonary HTN with recent admission -9/15/19 for LUQ abdominal pain r/t empyema s/p unsuccessful thoracentesis.  She was admitted on 10/6 from the ED for evaluation of recurrence of LUQ abdominal pain.  No acute pulmonary symptoms.    Today's recommendations:  - Await RVP results  - Follow sputum culture  - Tacro level 10/10  - Galactomannan and fungitell pending   - Await over-read of chest CT  - IR consult to obtain sample from empyema, planned for tomorrow  - ID consult    S/P bilateral sequential lung transplant (BSLT) for ILD:  Last seen in pulm clinic . Most recent PFTs with FEV1 61% (), overall improving. Last DSA () negative. Recent Steno maltophilia () on sputum culture, s/p 3 week course of PO ABX (Levaquin->Bactrim). Denies dyspnea, continues to have chronic morning cough, productive for clear, thin sputum and occasionally accompanied by nausea d/t gagging.  No hypoxia, fever, or leukocytosis. Admission CXR (personally reviewed) with small right pleural effusion and retrocardiac opacities c/w atelectasis.  - RVP in process (10/7), droplet isolation pending results  - Sputum culture (10/8)     Immunosuppression:  - Tacrolimus 2.5 mg BID.  Goal level 8-10 (d/t CKD). Dose increased 10/7. Next level 10/10 (ordered)  - MMF on HOLD since 18 d/t CMV viremia and leukopenia. Per Dr. Mcelroy, plan is to restart when CMV consistently negative.  - Prednisone 5 mg qAM / 2.5 mg qPM      Prophylaxis:   - Bactrim SS mg PO daily  - Pantoprazole EC 40 mg PO BID     Acute on Chronic LUQ abdominal pain:  LLL  empyema: Presented with ~3 month hx of intermittent LUQ abdominal pain, increased one week PTA. No fever, or chills. Recent admission (9/11-9/15) for similar complaint, with work up including MRCP (negative) and chest CT revealing LLL empeyma.  Thoracentesis (9/13) was unsuccessful. VATS was discussed, but at that time risk was felt to outweigh benefit. Positive galactomannan and fungitell (9/17) noted, antifungal deferred given stability. CT Chest (10/1) from OSH with no change in size of empeyma. Etiology likely bacterial vs atypical bacterial vs fungal. ESR and CRP decreased from 9/13 but remain slightly elevated. Pain better controlled with current regimen, intermittent nausea persists.  - Galactomannan and fungitell (10/7) pending   - Await results of chest CT over-read   - Agree with IR consult to obtain sample from empyema, planned for tomorrow  - Defer ABX or antifungal treatment for now pending chest CT results, thora inr IR, and ID consultation  - If patient develops systemic signs of infection, low threshold to treat empirically     Right main bronchial stenosis s/p dilatation: Follow with Dr. Lin for serial bronchoscopies with dilation. Last bronch 6/6 with dilation to BI.    - Next bronch scheduled for 10/11 with Dr. Lin, notified and will be proceeding as planned     H/o CMV Viremia: CMV has been followed Q2 week as OP. CMV PCR has been <100 since 2/21. Valcyte was discontinued 7/25. CMV transiently elevated to 662 (8/20) and now again decreased. Most recent level 10/6 not detected.     Other relevent problems managed by primary team:     CKD stage IV: Cr decreased to 1.4 from 1.7 on admission. Recent baseline 1.3 - 1.5.   - Will continue to monitor tacrolimus levels  - Avoid nephrotoxins and renally dose medications.    We appreciate the excellent care provided by the [place] [id number] Medicine team. Recommendations communicated in person rounding and this note. Will continue to follow along closely,  "please do not hesitate to call with any questions or concerns.    Pt. discussed with Dr. Christensen and as a joint visit with DNP APRN student Vibha Hazel, KODAKN    Hina Huff, MARY, APRN, CNP  Inpatient Nurse Practitioner  Pulmonary CF/Transplant  Pager [phone]     Subjective & Interval History:     No acute events overnight. Persistent cough this morning, productive for moderate amounts of thin, clear sputum and accompanied by nausea. No hypoxia or dyspnea. Zofran partially effective at controlling nausea. Appetite decreased; eating approximately half of meals. No stool since 10/6, but passing flatus. Pain well-controlled with prn oxycodone.     Review of Systems:     C: no fever, no chills, no change in weight, no change in appetite  INTEGUMENTARY/SKIN: no rash or obvious new lesions  ENT/MOUTH: no sore throat, no sinus pain, no nasal congestion or drainage  RESP: see interval history  CV: no chest pain, no palpitations, no peripheral edema, no orthopnea  GI: see interval history  : no dysuria  MUSCULOSKELETAL: no myalgias, no arthralgias  ENDOCRINE: blood sugars with adequate control  NEURO: no headache, no numbness or tingling  PSYCHIATRIC: mood stable    Physical Exam:     Vital signs:  Temp: 98.1  F (36.7  C) Temp src: Oral BP: (!) 167/96 Pulse: 78 Heart Rate: 70 Resp: 16 SpO2: 96 % O2 Device: None (Room air)   Height: 162.6 cm (5' 4\") Weight: 54.6 kg (120 lb 4.8 oz)  I/O:     Intake/Output Summary (Last 24 hours) at 10/8/2019 0832  Last data filed at 10/8/2019 0659  Gross per 24 hour   Intake 3088.33 ml   Output 1300 ml   Net 1788.33 ml     Constitutional: Sitting up in bed, in no apparent distress.   HEENT: Normocephalic and atraumatic. Eyes with pink conjunctivae, anicteric. Sclera white. Oral mucosa moist without lesions. Neck supple without lymphadenopathy.   PULM: Good air flow right, slightly diminished in LLL. Coarse crackles LLL and left mid lung field, no rhonchi, no wheezes. Non-labored breathing on " room air.  CV: Normal S1 and S2. RRR. No murmur, gallop, or rub. No peripheral edema.   ABD: Hypoactive bowel sounds, soft, nontender, nondistended.    MSK: Moves all extremities. No apparent muscle wasting.   NEURO: Alert and oriented, conversant.   SKIN: Warm, dry. No rash on limited exam.   PSYCH: Mood stable.    Lines, Drains, and Devices:  Peripheral IV 10/06/19 Left (Active)   Site Assessment WDL 10/8/2019  3:00 AM   Line Status Saline locked 10/8/2019  3:00 AM   Phlebitis Scale 0-->no symptoms 10/8/2019  3:00 AM   Infiltration Scale 0 10/8/2019  3:00 AM   Infiltration Site Treatment Method  None 10/8/2019  3:00 AM   Extravasation? No 10/8/2019  3:00 AM   Number of days: 2     Data:       LABS    CMP:   Recent Labs   Lab 10/08/19  0726 10/07/19  0717 10/06/19  1444    134 134   POTASSIUM 5.0 4.9 4.4   CHLORIDE 107 104 102   CO2 22 22 21   ANIONGAP 8 8 11   GLC 86 69* 95   BUN 20 22 21   CR 1.41* 1.69* 1.71*   GFRESTIMATED 41* 33* 33*   GFRESTBLACK 48* 38* 38*   CHELSEY 9.0 8.9 9.3   MAG  --   --  2.0   PHOS  --   --  3.9   PROTTOTAL  --   --  8.3   ALBUMIN  --   --  3.0*   BILITOTAL  --   --  0.4   ALKPHOS  --   --  279*   AST  --   --  24   ALT  --   --  22     CBC:   Recent Labs   Lab 10/08/19  0726 10/07/19  0717 10/06/19  1444   WBC 9.1 7.5 9.4   RBC 3.27* 3.00* 3.43*   HGB 8.9* 8.2* 9.2*   HCT 30.6* 27.6* 31.6*   MCV 94 92 92   MCH 27.2 27.3 26.8   MCHC 29.1* 29.7* 29.1*   RDW 17.1* 17.2* 16.9*   * 96* 121*       INR:   Recent Labs   Lab 10/06/19  1444   INR 1.06       Glucose:   Recent Labs   Lab 10/08/19  0726 10/07/19  0717 10/06/19  1444   GLC 86 69* 95       Blood Gas: No lab results found in last 7 days.    Culture Data No results for input(s): CULT in the last 168 hours.    Virology Data:   Lab Results   Component Value Date    FLUAH1 Negative 03/07/2019    FLUAH3 Negative 03/07/2019    XL9343 Negative 03/07/2019    IFLUB Negative 03/07/2019    RSVA Negative 03/07/2019    RSVB Negative  03/07/2019    PIV1 Negative 03/07/2019    PIV2 Negative 03/07/2019    PIV3 Negative 03/07/2019    HMPV Negative 03/07/2019    HRVS Positive (A) 03/07/2019    ADVBE Negative 03/07/2019    ADVC Negative 03/07/2019    ADVC Negative 01/17/2019    ADVC Negative 12/14/2018       Historical CMV results (last 3 of prior testing):  Lab Results   Component Value Date    CMVQNT CMV DNA Not Detected 10/06/2019    CMVQNT <137 (A) 09/12/2019    CMVQNT <137 (A) 06/05/2019     Lab Results   Component Value Date    CMVLOG Not Calculated 10/06/2019    CMVLOG <2.1 09/12/2019    CMVLOG <2.1 06/05/2019       Urine Studies    Recent Labs   Lab Test 09/12/19  0125 08/07/19  1512   URINEPH 7.5* 7.0   NITRITE Negative Negative   LEUKEST Negative Trace*   WBCU 3 19*       Most Recent [place] Pulmonary Function Testing (FVC/FEV1 only)  FVC-Pre   Date Value Ref Range Status   08/07/2019 2.22 L    06/05/2019 2.26 L    03/05/2019 1.97 L    01/16/2019 1.92 L      FVC-%Pred-Pre   Date Value Ref Range Status   08/07/2019 67 %    06/05/2019 70 %    03/05/2019 60 %    01/16/2019 59 %      FEV1-Pre   Date Value Ref Range Status   08/07/2019 1.60 L    06/05/2019 1.65 L    03/05/2019 1.31 L    01/16/2019 1.04 L      FEV1-%Pred-Pre   Date Value Ref Range Status   08/07/2019 61 %    06/05/2019 64 %    03/05/2019 51 %    01/16/2019 40 %        IMAGING    Recent Results (from the past 48 hour(s))   XR Chest 2 Views    Narrative    Exam:  XR CHEST 2 VW, 10/6/2019 4:58 PM    History: chest pain    Comparison:  CT chest 9/12/2019, chest radiograph 9/12/2019    Findings:  PA and lateral views of the chest. Sternotomy wires and  mediastinal surgical clips. Cardiac silhouette is stable. Small right  pleural effusion. No pneumothorax. Hazy retrocardiac opacities.  Visualized upper abdomen is unremarkable.      Impression    Impression:    Small right pleural effusion with hazy retrocardiac opacities favored  to represent atelectasis, less likely infection.     I  have personally reviewed the examination and initial interpretation  and I agree with the findings.    MEHNAZ CHAPMAN MD   US Lower Extremity Venous Duplex Bilateral    Narrative    EXAMINATION: Bilateral lower extremity venous Doppler ultrasound.    COMPARISON: Left lower extremity venous ultrasound on 4/9/2018    HISTORY: Please eval for DVT    FINDINGS:   The right common femoral, femoral, popliteal, and posterior tibial  veins are fully compressible with patent Doppler wave forms. No  thrombus is identified within them on grayscale imaging.    The left common femoral, femoral, popliteal, and posterior tibial  veins are fully compressible with patent Doppler wave forms. No  thrombus is identified within them on grayscale imaging.      Impression    IMPRESSION:    No deep venous thrombus demonstrated in either lower extremity.    I have personally reviewed the examination and initial interpretation  and I agree with the findings.    YAMINI GUERIN MD

## 2019-10-08 NOTE — PROCEDURES
[facility], [place]    Procedure: IR Procedure Note  Date/Time: 10/8/2019 5:26 PM  Performed by: Case Amaya MD  Authorized by: Case Amaya MD   IR Fellow Physician: Case Amaya MD  Other(s) attending procedure: Kristine Orellana MD    UNIVERSAL PROTOCOL   Site Marked: Yes  Prior Images Obtained and Reviewed:  Yes  Required items: Required blood products, implants, devices and special equipment available    Patient identity confirmed:  Verbally with patient  NA - No sedation, light sedation, or local anesthesia  Confirmation Checklist:  Patient's identity using two indicators, relevant allergies, procedure was appropriate and matched the consent or emergent situation and correct equipment/implants were available  Time out: Immediately prior to the procedure a time out was called    Preparation: Patient was prepped and draped in usual sterile fashion       ANESTHESIA    Anesthesia: See MAR for details  Local Anesthetic:  Lidocaine 1% without epinephrine      SEDATION    Patient Sedated: No    See dictated procedure note for full details.  Findings: Left pleural space collection    Specimens: fluid and/or tissue for gram stain and culture    Complications: None    Condition: Stable    Plan: - 1 hour bedrest supine post pleural space aspiration  - then, resume previous activity  - okay to advance diet  - fluid sent for requested laboratory analysis.    PROCEDURE   Patient Tolerance:  Patient tolerated the procedure well with no immediate complications  Describe Procedure: Left pleural space thick-rimed fluid empysema/plegmon aspiration with a 19 gauge co-axial needle  Time of Sedation in Minutes by Physician:  Jethro

## 2019-10-08 NOTE — PLAN OF CARE
Hypertensive (150s/90-100s), all other vital signs stable. PRN oxycodone given x1, PRN IV Zofran given x2. Up independently in room, 400 mL total void this shift, no BM but passing flatus. Good appetite on regular diet. Respiratory viral panel pending, sputum sample needs collecting, patient aware.

## 2019-10-08 NOTE — PROGRESS NOTES
Physician Attestation   ITarik  Christensen, saw and evaluated Kecia Blue as part of a shared visit.  I have reviewed and discussed with the advanced practice provider their history, physical and plan.    I personally reviewed the vital signs, medications, labs and imaging.    My key history or physical exam findings:   Kecia Blue is a 58 year old lady s/p nikki sltx for severe pulm HTN with ILD due to RA on 3/1/2018 (Lung). She was admitted from 9/11/19 to 9/15/19 for Left UQ pain. During this visit she also had bronchitis which was treated with oral anbx. The w/u for Left UQ pain revealed left empyema. We attempted to aspirate this fluid unsuccessfully. After consulting with ID we elected to monitor with out anbx. Her lab work for Galactomannan and fungitell were positive.     The pain had resolved by discharge. This recurred again last week and had a Chest CT. As the pain worsened and was not controlled by tylenol she was admitted to the hospital. The pain is now controlled with oxycodone.    She also had RADHA but Cr has improved. Her Tac was actually low hence the dose was increased on 10/7/19 PM.    Interestingly the pain seems to be better but when the pain is present there is no particular position that his helpful. In addition she has noted some nausea today. She has had emesis in the recent past.     She has noted cough that has slowly worsened over the last couple of months. As mentioned she was treated with oral anbx with significant improvement in cough. She continues to have cough that lasts nearly 20mins every day in AM. Today the cough lasted nearly an hour. it is productive of clear phlegm.    Key management decisions made by me:   - Reviewed previous Chest Ct done on 9/12/19 and 10/1/19 with radiology. There was a small pocked of effusion noted in left space in 8/2018. The last two CT chest showed the left empyema pocket remained unchanged which is about 4 x 3mm.  - Reviewed CT with IR  and they are willing to proceed with CT guided aspiration. Once the fluid is aspirated we will await culture results and consult ID.  - She has GB on imaging suggesting chronic thickening and gall stones. We are not going to pursue this as her sx are not related to it.  - Continues to have leucopenia hence on two drug IS. Check steady state Tac level on 10/10/19.  - H/o Rt mainstem stenosis and will proceed with bronch as previously scheduled on Friday.  - Agree with starting Remeron for appetite stimulation.  - Persistent cough - Sputum culture pending. Will discuss with our IP colleagues about doing BAL and sending for all the cultures.      Tarik Harkinsl  Date of Service (when I saw the patient): 10/08/19

## 2019-10-08 NOTE — CONSULTS
Patient is on IR schedule 10/8/2019 for a CT left empyema aspiration for antibiotic coverage .   Labs WNL for procedure.    No NPO required.   Consent will be done prior to procedure.   Empyema in the medial left costophrenic angle,  abx therapy with no resolvement  requesting dx sample   Please contact the IR [place] RN at [phone] for estimated time of procedure.     Discussed with Esteban Landis IR RPA  [phone] Call pager  [phone] pager

## 2019-10-08 NOTE — PROGRESS NOTES
[facility], [facility] Progress Note - Hospitalist Service, Gold 10       Date of Admission:  10/6/2019  Assessment & Plan   Kecia Blue is a 58 year old female admitted on 10/6/2019. She has PMH of ILD s/p bilateral lung transplant (3/2018), anti-synthetase syndrome, dermatomyositis, pulmonary HTN, and chronic cough with recent admission 9/11-9/15/19 for LUUQ abdominal pain who presents to he ED for evaluation of LUQ abdominal pain. Patient admitted to Medicine for further evaluation and care.     Changes Today:  - CT over- read today  - IR for thoracentesis  - Pain management improved  - beta glucan and Glucomannan pending  - Started Remeron for appetitie and sleep  - Dietary   - Bronch planned on Friday    # Acute on Chronic LUQ abdominal pain  # LLL empyema:   Presented with ~ 3 month hx of intermittent LUQ abdominal pain w/ radiation to the back. On recent admission, patient thought to have biliary source of pain, though MRCP revealed LLL empyema- confirmed by Chest CT. Thoracentesis attempt by IR unsuccessful. Etiology of empyema thought bacterial vs fungal vs atypical. ID consulted w/ plan to monitor patient clinically off of abx. (as small fluid collection and high surgical risk) and follow up with pulmonology on 10/10/19.  Blasto, crypto, coccidioides- negative. CRP on admission 25.0, VSS. LFT, Lipase wnl. No fever, chills, or elevated WBC count, pro-calcitonin negative    Further work up pending thoracentesis     # ILD s/p bilateral lung transplant:   No PTA O2 use. Current immunosuppression: Prednisone and tacrolimus. Bactrim prophylaxis.    - defer tacrolimus level to pulm  - Pain control w/ Oxycodone 5-10 mg q4h PRN  - Zofran PRN for nausea     # CKD IV: Cr on admission 1.71. BL appears 1.0-1.5 until ~ 6/2019 when Cr began to slowly rise.   - Avoid dehydration, hypotension, nephrotoxic medicatons, trend BMP     # Normocytic Anemia:   Hgb 9.3 on admission. BL  appears 8-9.     # Thrombocytopenia: Platelets 121 on admission, near baseline.   - Consider further w/u if continues to downtrend        # Dermatomyositis  # Seronegative RA:   No active treatments. No current symptoms.   - Monitor    # HTN: BP sable on admission. PTA Norvasc and metoprolol.   - Continue PTA medications. Hold parameters on metoprolol: SBP less than 100 or HR less than 60     # GERD: stable. PTA Protonix  - Continue      Diet: Combination Diet Regular Diet Adult    DVT Prophylaxis: Low Risk/Ambulatory with no VTE prophylaxis indicated  Basilio Catheter: not present  Code Status: Full Code      Disposition Plan   Expected discharge: 2 - 3 days, recommended to prior living arrangement once adequate pain management/ tolerating PO medications and plan for ?empyema established.  Entered: Clarence Rabago MD 10/08/2019, 4:42 PM     The patient's care was discussed with the Patient, Patient's Family and Pulmonary Team.    Clarence Rabago MD  Hospitalist Service, [address], [place]  Pager: [phone]  Please see sticky note for cross cover information  ______________________________________________________________________    Interval History   Pain improved. Present conitnually however decreased in LUQ of abdomen   SOB improved  No Fever or chills  No nausea or vomiting     Data reviewed today: I reviewed all medications, new labs and imaging results over the last 24 hours.     Physical Exam   Vital Signs: Temp: 97.8  F (36.6  C) Temp src: Oral BP: (!) 164/94 Pulse: 78 Heart Rate: 67 Resp: 16 SpO2: 93 % O2 Device: None (Room air)    Weight: 120 lbs 4.8 oz  Physical Exam  Constitutional:       Appearance: Normal appearance.   HENT:      Head: Normocephalic and atraumatic.      Mouth/Throat:      Mouth: Mucous membranes are moist.   Eyes:      Extraocular Movements: Extraocular movements intact.      Pupils: Pupils are equal, round, and reactive to light.   Cardiovascular:       Rate and Rhythm: Normal rate and regular rhythm.      Heart sounds: No murmur. No friction rub. No gallop.    Pulmonary:      Comments: diminished in left lower lung  Abdominal:      General: Abdomen is flat. There is no distension.      Palpations: Abdomen is soft.      Tenderness: There is no tenderness. There is no guarding.   Musculoskeletal:      Comments: Discrete wrist swelling   Neurological:      Mental Status: She is alert.           Data   Recent Labs   Lab 10/08/19  0726 10/07/19  0717 10/06/19  1444   WBC 9.1 7.5 9.4   HGB 8.9* 8.2* 9.2*   MCV 94 92 92   * 96* 121*   INR  --   --  1.06    134 134   POTASSIUM 5.0 4.9 4.4   CHLORIDE 107 104 102   CO2 22 22 21   BUN 20 22 21   CR 1.41* 1.69* 1.71*   ANIONGAP 8 8 11   CHELSEY 9.0 8.9 9.3   GLC 86 69* 95   ALBUMIN  --   --  3.0*   PROTTOTAL  --   --  8.3   BILITOTAL  --   --  0.4   ALKPHOS  --   --  279*   ALT  --   --  22   AST  --   --  24   LIPASE  --   --  172   TROPI  --   --  <0.015     No results found for this or any previous visit (from the past 24 hour(s)).  Medications     sodium chloride Stopped (10/08/19 1415)       amLODIPine  10 mg Oral Daily     calcium carbonate 600 mg-vitamin D 400 units  1 tablet Oral Daily     dronabinol  5 mg Oral BID AC     ferrous sulfate  325 mg Oral Daily with breakfast     gabapentin  300 mg Oral At Bedtime     influenza vaccine adult (product based on age)  0.5 mL Intramuscular Prior to discharge     magnesium oxide  800 mg Oral Daily     metoprolol tartrate  50 mg Oral BID     mirtazapine  15 mg Orally disintegrating tablet At Bedtime     multivitamin w/minerals  1 tablet Oral Daily     pantoprazole  40 mg Oral BID     predniSONE  2.5 mg Oral At Bedtime     predniSONE  5 mg Oral QAM     senna-docusate  1 tablet Oral BID    Or     senna-docusate  2 tablet Oral BID     sodium chloride (PF)  3 mL Intracatheter Q8H     sulfamethoxazole-trimethoprim  1 tablet Oral Daily     tacrolimus  2.5 mg Oral BID IS

## 2019-10-08 NOTE — PRE-PROCEDURE
GENERAL PRE-PROCEDURE:   Procedure:  Image guided pleural collection biopsy and/or aspiration  Date/Time:  10/8/2019 4:40 PM    Verbal consent obtained?: Yes    Written consent obtained?: Yes    Risks and benefits: Risks, benefits and alternatives were discussed    Consent given by:  Patient  Patient states understanding of procedure being performed: Yes    Patient's understanding of procedure matches consent: Yes    Procedure consent matches procedure scheduled: Yes    : local only.  Appropriately NPO:  Yes  ASA Class:  Class 2- mild systemic disease, no acute problems, no functional limitations  History & Physical reviewed:  History and physical reviewed and no updates needed  Statement of review:  I have reviewed the lab findings, diagnostic data, medications, and the plan for sedation

## 2019-10-08 NOTE — PROGRESS NOTES
Patient Name: Kecia Blue  Medical Record Number: [id number]  Today's Date: 10/8/2019    Procedure: Image guided biopsy of left pleural collection  Proceduralist: Dr Amaya, Dr Orellana    Sedation Notes: no sedation     Procedure start time: 1709  Puncture time: 1711  Procedure end time: 1722    Report given to: LOUISE Hyman 7A      Other Notes: Pt arrived to IR room CT 2 from . Consent reviewed and signed. Pt denies any questions or concerns regarding procedure.   Pt positioned prone and monitored per protocol.   Specimens collected, labeled and sent to lab as ordered.  Pt tolerated procedure without any noted complications. Pt transferred back to .

## 2019-10-09 LAB
1,3 BETA GLUCAN SER-MCNC: 269 PG/ML
ANION GAP SERPL CALCULATED.3IONS-SCNC: 5 MMOL/L (ref 3–14)
B-D GLUCAN INTERPRETATION (1,3): POSITIVE
BUN SERPL-MCNC: 18 MG/DL (ref 7–30)
CALCIUM SERPL-MCNC: 8.8 MG/DL (ref 8.5–10.1)
CHLORIDE SERPL-SCNC: 108 MMOL/L (ref 94–109)
CO2 SERPL-SCNC: 22 MMOL/L (ref 20–32)
CREAT SERPL-MCNC: 1.24 MG/DL (ref 0.52–1.04)
GFR SERPL CREATININE-BSD FRML MDRD: 48 ML/MIN/{1.73_M2}
GLUCOSE SERPL-MCNC: 105 MG/DL (ref 70–99)
POTASSIUM SERPL-SCNC: 4.5 MMOL/L (ref 3.4–5.3)
SODIUM SERPL-SCNC: 135 MMOL/L (ref 133–144)

## 2019-10-09 PROCEDURE — 25800030 ZZH RX IP 258 OP 636: Performed by: PHYSICIAN ASSISTANT

## 2019-10-09 PROCEDURE — 25000131 ZZH RX MED GY IP 250 OP 636 PS 637: Performed by: NURSE PRACTITIONER

## 2019-10-09 PROCEDURE — 80048 BASIC METABOLIC PNL TOTAL CA: CPT | Performed by: INTERNAL MEDICINE

## 2019-10-09 PROCEDURE — 25000128 H RX IP 250 OP 636: Performed by: PHYSICIAN ASSISTANT

## 2019-10-09 PROCEDURE — 25000132 ZZH RX MED GY IP 250 OP 250 PS 637: Performed by: PHYSICIAN ASSISTANT

## 2019-10-09 PROCEDURE — 12000012 ZZH R&B MS OVERFLOW UMMC

## 2019-10-09 PROCEDURE — 36415 COLL VENOUS BLD VENIPUNCTURE: CPT | Performed by: INTERNAL MEDICINE

## 2019-10-09 PROCEDURE — 25000131 ZZH RX MED GY IP 250 OP 636 PS 637: Performed by: PHYSICIAN ASSISTANT

## 2019-10-09 PROCEDURE — 25000128 H RX IP 250 OP 636: Performed by: HOSPITALIST

## 2019-10-09 PROCEDURE — 25000132 ZZH RX MED GY IP 250 OP 250 PS 637: Performed by: INTERNAL MEDICINE

## 2019-10-09 PROCEDURE — 25000131 ZZH RX MED GY IP 250 OP 636 PS 637: Performed by: HOSPITALIST

## 2019-10-09 PROCEDURE — 90682 RIV4 VACC RECOMBINANT DNA IM: CPT | Performed by: PHYSICIAN ASSISTANT

## 2019-10-09 PROCEDURE — 99231 SBSQ HOSP IP/OBS SF/LOW 25: CPT | Performed by: INTERNAL MEDICINE

## 2019-10-09 RX ORDER — ISOSORBIDE MONONITRATE 30 MG/1
30 TABLET, EXTENDED RELEASE ORAL DAILY
Status: DISCONTINUED | OUTPATIENT
Start: 2019-10-09 | End: 2019-10-10

## 2019-10-09 RX ADMIN — TACROLIMUS 2.5 MG: 1 CAPSULE ORAL at 08:37

## 2019-10-09 RX ADMIN — INFLUENZA A VIRUS A/BRISBANE/02/2018 (H1N1) RECOMBINANT HEMAGGLUTININ ANTIGEN, INFLUENZA A VIRUS A/KANSAS/14/2017 (H3N2) RECOMBINANT HEMAGGLUTININ ANTIGEN, INFLUENZA B VIRUS B/PHUKET/3073/2013 RECOMBINANT HEMAGGLUTININ ANTIGEN, AND INFLUENZA B VIRUS B/MARYLAND/15/2016 RECOMBINANT HEMAGGLUTININ ANTIGEN 0.5 ML: 45; 45; 45; 45 INJECTION INTRAMUSCULAR at 12:24

## 2019-10-09 RX ADMIN — SODIUM CHLORIDE: 9 INJECTION, SOLUTION INTRAVENOUS at 06:23

## 2019-10-09 RX ADMIN — FERROUS SULFATE TAB 325 MG (65 MG ELEMENTAL FE) 325 MG: 325 (65 FE) TAB at 08:37

## 2019-10-09 RX ADMIN — OXYCODONE HYDROCHLORIDE 10 MG: 5 TABLET ORAL at 21:06

## 2019-10-09 RX ADMIN — Medication 1 TABLET: at 08:38

## 2019-10-09 RX ADMIN — METOPROLOL TARTRATE 50 MG: 25 TABLET ORAL at 19:53

## 2019-10-09 RX ADMIN — METOPROLOL TARTRATE 50 MG: 25 TABLET ORAL at 08:38

## 2019-10-09 RX ADMIN — PROCHLORPERAZINE EDISYLATE 10 MG: 5 INJECTION, SOLUTION INTRAMUSCULAR; INTRAVENOUS at 04:03

## 2019-10-09 RX ADMIN — ONDANSETRON 4 MG: 4 TABLET, ORALLY DISINTEGRATING ORAL at 21:06

## 2019-10-09 RX ADMIN — MIRTAZAPINE 15 MG: 15 TABLET, ORALLY DISINTEGRATING ORAL at 21:09

## 2019-10-09 RX ADMIN — TACROLIMUS 2.5 MG: 1 CAPSULE ORAL at 18:03

## 2019-10-09 RX ADMIN — PANTOPRAZOLE SODIUM 40 MG: 40 TABLET, DELAYED RELEASE ORAL at 19:53

## 2019-10-09 RX ADMIN — MAGNESIUM OXIDE TAB 400 MG (241.3 MG ELEMENTAL MG) 800 MG: 400 (241.3 MG) TAB at 08:38

## 2019-10-09 RX ADMIN — ISOSORBIDE MONONITRATE 30 MG: 30 TABLET, EXTENDED RELEASE ORAL at 12:17

## 2019-10-09 RX ADMIN — OXYCODONE HYDROCHLORIDE 10 MG: 5 TABLET ORAL at 04:03

## 2019-10-09 RX ADMIN — PREDNISONE 5 MG: 5 TABLET ORAL at 08:37

## 2019-10-09 RX ADMIN — PREDNISONE 2.5 MG: 2.5 TABLET ORAL at 21:06

## 2019-10-09 RX ADMIN — AMLODIPINE BESYLATE 10 MG: 10 TABLET ORAL at 08:38

## 2019-10-09 RX ADMIN — SENNOSIDES AND DOCUSATE SODIUM 1 TABLET: 8.6; 5 TABLET ORAL at 19:53

## 2019-10-09 RX ADMIN — SULFAMETHOXAZOLE AND TRIMETHOPRIM 1 TABLET: 400; 80 TABLET ORAL at 08:38

## 2019-10-09 RX ADMIN — PANTOPRAZOLE SODIUM 40 MG: 40 TABLET, DELAYED RELEASE ORAL at 08:37

## 2019-10-09 RX ADMIN — POLYETHYLENE GLYCOL 3350 17 G: 17 POWDER, FOR SOLUTION ORAL at 21:13

## 2019-10-09 RX ADMIN — SENNOSIDES AND DOCUSATE SODIUM 2 TABLET: 8.6; 5 TABLET ORAL at 08:37

## 2019-10-09 RX ADMIN — MULTIPLE VITAMINS W/ MINERALS TAB 1 TABLET: TAB at 08:38

## 2019-10-09 ASSESSMENT — MIFFLIN-ST. JEOR: SCORE: 1142.9

## 2019-10-09 ASSESSMENT — ACTIVITIES OF DAILY LIVING (ADL)
ADLS_ACUITY_SCORE: 11

## 2019-10-09 ASSESSMENT — PAIN DESCRIPTION - DESCRIPTORS
DESCRIPTORS: ACHING
DESCRIPTORS: ACHING

## 2019-10-09 NOTE — PLAN OF CARE
"BP (!) 165/99 (BP Location: Right arm)   Pulse 75   Temp 97.8  F (36.6  C) (Oral)   Resp 18   Ht 1.626 m (5' 4\")   Wt 54.6 kg (120 lb 4.8 oz)   LMP 06/07/2014 (Exact Date)   SpO2 98%   BMI 20.65 kg/m        Patient hypertensive in 160's/100's on RA; afebrile. Denies pain. Tolerating regular diet with good appetite. PIV SL. No BM this shift. Voiding adequately. Thora site CDI. Up independently around room. Will continue with POC and notify MD with changes or concerns.       "

## 2019-10-09 NOTE — PROGRESS NOTES
Pulmonary Medicine  Cystic Fibrosis - Lung Transplant Team  Progress Note  2019       Patient: Kecia Blue  MRN: [id number]  : 1960 (age 58 year old)  Transplant: 3/1/2018 (Lung), POD#587  Admission date: 10/6/2019    Assessment & Plan:     Kecia Blue is a 58 year old female with a PMH of ILD s/p BSLT (3/2018), anti-synthetase syndrome, dermatomyositis, and pulmonary HTN with recent admission -9/15/19 for LUQ abdominal pain r/t empyema s/p unsuccessful thoracentesis.  She was admitted on 10/6 from the ED for evaluation of recurrence of LUQ abdominal pain. No acute pulmonary symptoms. Underwent CT guided aspiration of LLL empyema (10/9).      Today's recommendations:  - Follow pleural fluid cultures; will dose antibiotics as appropriate  - Galactomannan and fungitell pending   - Tacro level 10/10  - Scheduled bronch 10/11 with IP, plan for BAL to LLL if able  - Encourage use of Remeron tonight     S/P bilateral sequential lung transplant (BSLT) for ILD:  Last seen in pulm clinic . Most recent PFTs with FEV1 61% (), overall improving. Last DSA () negative. Recent Steno maltophilia () on sputum culture, s/p 3 week course of PO ABX (Levaquin->Bactrim). Denies dyspnea, continues to have chronic morning cough, productive for clear, thin sputum and occasionally accompanied by nausea d/t gagging.  No hypoxia, fever, or leukocytosis. Admission CXR (personally reviewed) with small right pleural effusion and retrocardiac opacities c/w atelectasis. RVP (10/7) negative. Sputum culture (10/8) with normal alo.   - Continue current treatment plan. No changes today (10/9)     Immunosuppression:  - Tacrolimus 2.5 mg BID.  Goal level 8-10 (d/t CKD). Dose increased 10/7. Next level 10/10 (ordered)  - MMF on HOLD since 18 d/t CMV viremia and leukopenia. Per Dr. Mcelroy, plan is to restart when CMV consistently negative. First negative CMV 10/6. Will repeat as OP.   -  Prednisone 5 mg qAM / 2.5 mg qPM      Prophylaxis:   - Bactrim SS mg PO daily  - Pantoprazole BID     LLL empyema:  Acute on chronic LUQ abdominal pain:  Presented with 3-month hx of intermittent LUQ abdominal pain, increased one week PTA. No fever, or chills. Recent admission (9/11-9/15) for similar complaint, with work up including MRCP (negative) and chest CT revealing LLL empeyma. Thoracentesis (9/13) was unsuccessful. Positive galactomannan and fungitell (9/17) noted, antifungal deferred given stability. CT Chest (10/1) from OSH with no change in size of empeyma. Etiology likely bacterial vs atypical bacterial vs fungal. ESR and CRP decreased from 9/13 but remain slightly elevated. Underwent diagnostic thoracentesis in IR (10/8), report indicates fluid with purulent appearance. Pain significantly decreased and nausea has resolved (10/9).   - Galactomannan and fungitell (10/7) pending   - Pleural fluid cultures (10/8) NGTD; will dose antibiotics as appropriate  - If patient develops systemic signs of infection, low threshold to treat empirically     Right main bronchial stenosis s/p multiple dilatations (no stenting): Follow with Dr. Lin for serial bronchoscopies with dilation, most recent 6/6.    - Next bronch scheduled for 10/11 with Dr. Lin, notified and will be proceeding as planned  - Will try to obtain BAL of LLL, posterior basal segment (message left with Dr. Lin)     H/o CMV viremia: CMV has been followed Q2 week as OP. CMV PCR has been <100 since 2/21. Valcyte was discontinued 7/25. CMV transiently elevated to 662 (8/20) and now again decreased. Most recent level 10/6 not detected.     Other relevent problems managed by primary team:     CKD stage IV: Cr decreased to 1.4 from 1.7 on admission. Recent baseline 1.3 - 1.5.   - Continue to monitor tacrolimus levels  - Avoid nephrotoxins and renally dose medications.    Recurrent nausea and decreased oral intake: Patient has struggled to meet  nutritional requirements since transplant. Intermittent bouts of nausea has exacerbated this issue. Taking good PO this morning, however she has not been consistent for any appreciable period of time. Marinol ordered PTA but had not been taking consistently and therefore reporting little benefit.  - Remeron at bedtime, encourage use to assess for benefit with nausea and sleep     We appreciate the excellent care provided by the [place] [id number] team. Recommendations communicated in person rounding and this note. Will continue to follow along closely, please do not hesitate to call with any questions or concerns.    Pt. discussed with Dr. Christensen and as a joint visit with MARY APRN student Vibha Hazel, BINH Huff, MARY, APRN, CNP  Inpatient Nurse Practitioner  Pulmonary CF/Transplant  Pager [phone]     Subjective & Interval History:     No acute events overnight. Cough decreased to baseline this morning. Sputum remains clear and thin. Successful diagnostic thoracentesis in IR.  Pain at puncture site decreased overnight, oxycodone effective. Nausea and abdominal pain significantly improved, patient feels this may be due to decreased narcotic use. Took good PO at breakfast. No bowel movement, passing flatus. Interested in taking second dose of Miralax this evening.     Review of Systems:     C: no fever, no chills, + increased weight   INTEGUMENTARY/SKIN: no rash or obvious new lesions  ENT/MOUTH: no sore throat, no sinus pain, no nasal congestion or drainage  RESP: see interval history  CV: no chest pain, no palpitations, no peripheral edema, no orthopnea  GI: see interval history  : no dysuria  MUSCULOSKELETAL: no myalgias, no arthralgias  ENDOCRINE: blood sugars with adequate control  NEURO: no headache, no numbness or tingling  PSYCHIATRIC: mood stable    Physical Exam:     Vital signs:  Temp: 98.4  F (36.9  C) Temp src: Oral BP: (!) 165/91 Pulse: 76 Heart Rate: 72 Resp: 16 SpO2: 96 % O2 Device: None  "(Room air)   Height: 162.6 cm (5' 4\") Weight: 56.8 kg (125 lb 3.2 oz)  I/O:     Intake/Output Summary (Last 24 hours) at 10/9/2019 0753  Last data filed at 10/9/2019 0400  Gross per 24 hour   Intake 1075 ml   Output 1400 ml   Net -325 ml     Constitutional: Sitting up in bed, alert, smiling, in no apparent distress.   HEENT: Normocephalic and atraumatic. Eyes with pink conjunctivae, anicteric. Sclera white. Oral mucosa moist without lesions. Neck supple without lymphadenopathy.   PULM: Good air flow right, diminished in left lower and middle lung fields. Coarse crackles LLL, no rhonchi, no wheezes. Non-labored breathing on room air.  CV: Normal S1 and S2. RRR. No murmur, gallop, or rub. No peripheral edema.   ABD: Hypoactive bowel sounds, soft, nontender, nondistended.    MSK: Moves all extremities. No apparent muscle wasting.   NEURO: Alert and oriented, conversant.   SKIN: Warm, dry. No rash on limited exam.   PSYCH: Mood stable.    Lines, Drains, and Devices:  Peripheral IV 10/06/19 Left (Active)   Site Assessment WDL 10/9/2019 12:00 AM   Line Status Infusing 10/9/2019 12:00 AM   Phlebitis Scale 0-->no symptoms 10/9/2019 12:00 AM   Infiltration Scale 0 10/9/2019 12:00 AM   Infiltration Site Treatment Method  None 10/9/2019 12:00 AM   Extravasation? No 10/9/2019 12:00 AM   Number of days: 3     Data:     LABS    CMP:   Recent Labs   Lab 10/08/19  0726 10/07/19  0717 10/06/19  1444    134 134   POTASSIUM 5.0 4.9 4.4   CHLORIDE 107 104 102   CO2 22 22 21   ANIONGAP 8 8 11   GLC 86 69* 95   BUN 20 22 21   CR 1.41* 1.69* 1.71*   GFRESTIMATED 41* 33* 33*   GFRESTBLACK 48* 38* 38*   CHELSEY 9.0 8.9 9.3   MAG  --   --  2.0   PHOS  --   --  3.9   PROTTOTAL  --   --  8.3   ALBUMIN  --   --  3.0*   BILITOTAL  --   --  0.4   ALKPHOS  --   --  279*   AST  --   --  24   ALT  --   --  22     CBC:   Recent Labs   Lab 10/08/19  0726 10/07/19  0717 10/06/19  1444   WBC 9.1 7.5 9.4   RBC 3.27* 3.00* 3.43*   HGB 8.9* 8.2* 9.2* "   HCT 30.6* 27.6* 31.6*   MCV 94 92 92   MCH 27.2 27.3 26.8   MCHC 29.1* 29.7* 29.1*   RDW 17.1* 17.2* 16.9*   * 96* 121*       INR:   Recent Labs   Lab 10/06/19  1444   INR 1.06       Glucose:   Recent Labs   Lab 10/08/19  0726 10/07/19  0717 10/06/19  1444   GLC 86 69* 95       Blood Gas: No lab results found in last 7 days.    Culture Data   Recent Labs   Lab 10/08/19  1719   CULT PENDING  PENDING       Virology Data:   Lab Results   Component Value Date    FLUAH1 Negative 10/07/2019    FLUAH3 Negative 10/07/2019    EG0567 Negative 10/07/2019    IFLUB Negative 10/07/2019    RSVA Negative 10/07/2019    RSVB Negative 10/07/2019    PIV1 Negative 10/07/2019    PIV2 Negative 10/07/2019    PIV3 Negative 10/07/2019    HMPV Negative 10/07/2019    HRVS Negative 10/07/2019    ADVBE Negative 10/07/2019    ADVC Negative 10/07/2019    ADVC Negative 03/07/2019    ADVC Negative 01/17/2019       Historical CMV results (last 3 of prior testing):  Lab Results   Component Value Date    CMVQNT CMV DNA Not Detected 10/06/2019    CMVQNT <137 (A) 09/12/2019    CMVQNT <137 (A) 06/05/2019     Lab Results   Component Value Date    CMVLOG Not Calculated 10/06/2019    CMVLOG <2.1 09/12/2019    CMVLOG <2.1 06/05/2019       Urine Studies    Recent Labs   Lab Test 09/12/19  0125 08/07/19  1512   URINEPH 7.5* 7.0   NITRITE Negative Negative   LEUKEST Negative Trace*   WBCU 3 19*       Most Recent [place] Pulmonary Function Testing (FVC/FEV1 only)  FVC-Pre   Date Value Ref Range Status   08/07/2019 2.22 L    06/05/2019 2.26 L    03/05/2019 1.97 L    01/16/2019 1.92 L      FVC-%Pred-Pre   Date Value Ref Range Status   08/07/2019 67 %    06/05/2019 70 %    03/05/2019 60 %    01/16/2019 59 %      FEV1-Pre   Date Value Ref Range Status   08/07/2019 1.60 L    06/05/2019 1.65 L    03/05/2019 1.31 L    01/16/2019 1.04 L      FEV1-%Pred-Pre   Date Value Ref Range Status   08/07/2019 61 %    06/05/2019 64 %    03/05/2019 51 %    01/16/2019 40 %         IMAGING    Recent Results (from the past 48 hour(s))   CT Empyema Abscess Drainage    Narrative    Procedures 10/8/2019:  1. CT-guided left pleural fluid aspiration    History: Empyema versus phlegmon in the medial left costophrenic  angle, abx therapy with without resolution. Aspiration requested.    Comparison: CT 10/1/2019    Staff: Kristine Orellana MD    Fellow/Resident: Case Amaya M.D.    Monitoring: Local anesthesia only. No intravenous conscious sedation  administered by the IR nursing staff. Patient remained stable  throughout the procedure.     Medications: 5 cc 1% lidocaine    DLP: 320 mGy*cm    Findings/procedure: The patient understood the limitations,  alternatives, and risks of the procedure and requested the procedure  be performed. Both written and oral consent were obtained.    Limited pre-procedural scan performed demonstrated adequate and safe  percutaneous window for left posterior costophrenic thick rimmed  collection aspiration.    The left posterior chest walls was prepped and draped in the usual  sterile fashion. 1% lidocaine without epinephrine was used for local  anesthesia.     Under CT guidance, a 19 gauge coaxial introducer needle was advanced  into the posterior costophrenic thick rimmed collection, projected  into the central hypodense portion of the collection. CT image  documenting needle position was saved in the patient's record.  Approximately 4 cc of purulent fluid aspirated. Fluid submitted for  requested laboratory analysis.     Needles removed. Sterile airtight dressing applied.    Limited post-procedural scan demonstrated small amount gas within the  sampled central portion of the collection. No immediate complication.     Estimate blood loss: None    Specimens: None.      Impression    Impression:  CT fluoroscopic left posterior costophrenic fluid collection  aspiration. 4 cc purulent fluid sent for requested laboratory  analysis.    Plan: Return to floor. One hour of  bed rest supine.

## 2019-10-09 NOTE — PLAN OF CARE
"BP (!) 165/91 (BP Location: Right arm)   Pulse 76   Temp 98.4  F (36.9  C) (Oral)   Resp 16   Ht 1.626 m (5' 4\")   Wt 56.8 kg (125 lb 3.2 oz)   LMP 06/07/2014 (Exact Date)   SpO2 96%   BMI 21.49 kg/m      Afeb., BPs: 160's/ (pt's baseline), OVSS, on RA. Pt. c/o left upper abdominal pain & received prn Oxycodone 10mg  x1. Nausea treated with prn IV Compazine x1, no emesis. Pt. Up with SBA. Voiding adequate amounts, no stools this shift. NS at 100cc/hour into left PIV.  Pt. Slept well between cares.  Continue to follow POC & notify team with changes/concerns.   "

## 2019-10-09 NOTE — PROGRESS NOTES
[facility], [facility] Progress Note - Hospitalist Service, Gold 10       Date of Admission:  10/6/2019  Assessment & Plan   Kecia Blue is a 58 year old female admitted on 10/6/2019. She has PMH of ILD s/p bilateral lung transplant (3/2018), anti-synthetase syndrome, dermatomyositis, pulmonary HTN, and chronic cough with recent admission 9/11-9/15/19 for LUUQ abdominal pain who presents to he ED for evaluation of LUQ abdominal pain. Patient admitted to Medicine for further evaluation and care.     Changes Today:    - IR for thoracentesis- 4 ml fluid obtained. Awaiting results of further work up.   - pain free today except soreness at thora site.   - beta glucan positive   - Remeron for appetitie and sleep- encouraged  - Dietary   - Bronch planned on Friday    # Acute on Chronic LUQ abdominal pain  # LLL empyema:   Presented with ~ 3 month hx of intermittent LUQ abdominal pain w/ radiation to the back. On recent admission, patient thought to have biliary source of pain, though MRCP revealed LLL empyema- confirmed by Chest CT. Thoracentesis attempt by IR unsuccessful. Etiology of empyema thought bacterial vs fungal vs atypical. ID consulted w/ plan to monitor patient clinically off of abx. (as small fluid collection and high surgical risk) and follow up with pulmonology on 10/10/19.  Blasto, crypto, coccidioides- negative. CRP on admission 25.0, VSS. LFT, Lipase wnl. No fever, chills, or elevated WBC count, pro-calcitonin negative    Further work up pending thoracentesis.   Beta d glucan resulted positive. Awaiting fungal cultures    # ILD s/p bilateral lung transplant:   No PTA O2 use. Current immunosuppression: Prednisone and tacrolimus. Bactrim prophylaxis.    - defer tacrolimus level to pulm  - Pain control w/ Oxycodone 5-10 mg q4h PRN  - Zofran PRN for nausea     # CKD IV: Cr on admission 1.71. BL appears 1.0-1.5 until ~ 6/2019 when Cr began to slowly rise.   - Avoid  dehydration, hypotension, nephrotoxic medicatons, trend BMP.  - Stop IV fluids    # Normocytic Anemia:   Hgb 9.3 on admission. BL appears 8-9.     # Thrombocytopenia: Platelets 121 on admission, near baseline.   - Consider further w/u if continues to downtrend        # Dermatomyositis  # Seronegative RA:   No active treatments. No current symptoms.   - Monitor    # HTN: BP sable on admission.  Norvasc and metoprolol.   - Continue PTA medications. Hold parameters on metoprolol: SBP less than 100 or HR less than 60   - start imdur secondary to elevated BP    # GERD: stable. PTA Protonix  - Continue PPI      Diet: Regular Diet Adult    DVT Prophylaxis: Low Risk/Ambulatory with no VTE prophylaxis indicated  Basilio Catheter: not present  Code Status: Full Code      Disposition Plan   Expected discharge: 2 - 3 days, recommended to prior living arrangement once adequate pain management/ tolerating PO medications and plan for ?empyema established.  Entered: Clarence Rabago MD 10/09/2019, 3:16 PM     The patient's care was discussed with the Patient, Patient's Family and Pulmonary Team.    Clarence Rabago MD  Hospitalist Service, [address], [place]  Pager: [phone]  Please see sticky note for cross cover information  ______________________________________________________________________    Interval History   Pain resolved excluding thoracentesis site which is not requiring any pain meds  SOB improved  No Fever or chills  No nausea or vomiting     Data reviewed today: I reviewed all medications, new labs and imaging results over the last 24 hours.     Physical Exam   Vital Signs: Temp: 98  F (36.7  C) Temp src: Oral BP: (!) 169/114 Pulse: 76 Heart Rate: 66 Resp: 16 SpO2: 95 % O2 Device: None (Room air)    Weight: 125 lbs 3.2 oz  Physical Exam  Constitutional:       Appearance: Normal appearance.   HENT:      Head: Normocephalic and atraumatic.      Mouth/Throat:      Mouth: Mucous membranes are  moist.   Eyes:      Extraocular Movements: Extraocular movements intact.      Pupils: Pupils are equal, round, and reactive to light.   Cardiovascular:      Rate and Rhythm: Normal rate and regular rhythm.      Heart sounds: No murmur. No friction rub. No gallop.    Abdominal:      General: Abdomen is flat. There is no distension.      Palpations: Abdomen is soft.      Tenderness: There is no tenderness. There is no guarding.   Musculoskeletal:      Comments: Discrete wrist swelling consistent with nodule   Neurological:      Mental Status: She is alert.           Data   Recent Labs   Lab 10/09/19  1040 10/08/19  0726 10/07/19  0717 10/06/19  1444   WBC  --  9.1 7.5 9.4   HGB  --  8.9* 8.2* 9.2*   MCV  --  94 92 92   PLT  --  109* 96* 121*   INR  --   --   --  1.06    136 134 134   POTASSIUM 4.5 5.0 4.9 4.4   CHLORIDE 108 107 104 102   CO2 22 22 22 21   BUN 18 20 22 21   CR 1.24* 1.41* 1.69* 1.71*   ANIONGAP 5 8 8 11   CHELSEY 8.8 9.0 8.9 9.3   * 86 69* 95   ALBUMIN  --   --   --  3.0*   PROTTOTAL  --   --   --  8.3   BILITOTAL  --   --   --  0.4   ALKPHOS  --   --   --  279*   ALT  --   --   --  22   AST  --   --   --  24   LIPASE  --   --   --  172   TROPI  --   --   --  <0.015     Recent Results (from the past 24 hour(s))   CT Empyema Abscess Drainage    Narrative    Procedures 10/8/2019:  1. CT-guided left pleural fluid aspiration    History: Empyema versus phlegmon in the medial left costophrenic  angle, abx therapy with without resolution. Aspiration requested.    Comparison: CT 10/1/2019    Staff: Kristine Orellana MD    Fellow/Resident: Case Amaya M.D.    Monitoring: Local anesthesia only. No intravenous conscious sedation  administered by the IR nursing staff. Patient remained stable  throughout the procedure.     Medications: 5 cc 1% lidocaine    DLP: 320 mGy*cm    Findings/procedure: The patient understood the limitations,  alternatives, and risks of the procedure and requested the procedure  be  performed. Both written and oral consent were obtained.    Limited pre-procedural scan performed demonstrated adequate and safe  percutaneous window for left posterior costophrenic thick rimmed  collection aspiration.    The left posterior chest walls was prepped and draped in the usual  sterile fashion. 1% lidocaine without epinephrine was used for local  anesthesia.     Under CT guidance, a 19 gauge coaxial introducer needle was advanced  into the posterior costophrenic thick rimmed collection, projected  into the central hypodense portion of the collection. CT image  documenting needle position was saved in the patient's record.  Approximately 4 cc of purulent fluid aspirated. Fluid submitted for  requested laboratory analysis.     Needles removed. Sterile airtight dressing applied.    Limited post-procedural scan demonstrated small amount gas within the  sampled central portion of the collection. No immediate complication.     Estimate blood loss: None    Specimens: None.      Impression    Impression:  CT fluoroscopic left posterior costophrenic fluid collection  aspiration. 4 cc purulent fluid sent for requested laboratory  analysis.    Plan: Return to floor. One hour of bed rest supine.     Medications     no pre procedure antibiotic needed         amLODIPine  10 mg Oral Daily     calcium carbonate 600 mg-vitamin D 400 units  1 tablet Oral Daily     ferrous sulfate  325 mg Oral Daily with breakfast     isosorbide mononitrate  30 mg Oral Daily     magnesium oxide  800 mg Oral Daily     metoprolol tartrate  50 mg Oral BID     mirtazapine  15 mg Orally disintegrating tablet At Bedtime     multivitamin w/minerals  1 tablet Oral Daily     pantoprazole  40 mg Oral BID     predniSONE  2.5 mg Oral At Bedtime     predniSONE  5 mg Oral QAM     senna-docusate  1 tablet Oral BID    Or     senna-docusate  2 tablet Oral BID     sodium chloride (PF)  3 mL Intracatheter Q8H     sulfamethoxazole-trimethoprim  1 tablet Oral  Daily     tacrolimus  2.5 mg Oral BID IS

## 2019-10-09 NOTE — PHARMACY-ADMISSION MEDICATION HISTORY
"Admission medication history interview status for the 10/6/2019 admission is complete. See Epic admission navigator for allergy information, pharmacy, prior to admission medications and immunization status.     Medication history interview sources:  Patient, patient's , outside meds list    Changes made to PTA medication list (reason)  Added: None    Deleted:   Gabapentin 300 mg capsules- Patient stopped taking this medication with prescriber approval; says it doesn't work for her  Sulamethoxazole-trimethoprim 800/160 mg tablets- Patient has switched to the 400/80 mg tablets as of 10/4/19 per patient    Changed:  Magnesium oxide 400 mg tablets- Changed \"take 800 mg by mouth daily\" to \"take 400 mg by mouth daily\" per patient  Tacrolimus dosing changed from \"2.5 mg in the morning and 2 mg in the evening\" to \"2 mg in the morning and 2.5 mg in the evening\" per patient    Additional medication history information (including reliability of information, actions taken by pharmacist):    Patient was coherent and a good historian of her medications. Seemed very aware of when she last took her medications at home.    - Patient says that she last took dronabinol on Friday (10/04/19) evening, but only uses PRN and uses it maybe once every other week    - Patient uses ondansetron as needed for nausea and uses one tablet every other day on average    - Patient switched from sulfamethoxazole-trimethoprim 800/160 mg to sulfamethoxazole-trimethoprim 400/80 mg on Friday 10/04/19 and should be on the lower strength now    - No notes in chart history for the change in the tacrolimus regimen, however the daily dose is still the same as what was on the original prior to admission med list before the med history was conducted      Prior to Admission medications    Medication Sig Last Dose Taking? Auth Provider   acetaminophen (TYLENOL) 500 MG tablet Take 1,000 mg by mouth every 6 hours as needed for pain  10/6/2019 at PM Yes " Reported, Patient   amLODIPine (NORVASC) 10 MG tablet Take 1 tablet (10 mg) by mouth daily 10/6/2019 at AM Yes Ame Chow PA-C   calcium-vitamin D (CALTRATE) 600-400 MG-UNIT per tablet Take 1 tablet by mouth daily 10/5/2019 at AM Yes Ame Chow PA-C   dronabinol (MARINOL) 5 MG capsule Take 5 mg by mouth 2 times daily (before meals) as needed for nausea 10/4/2019 at PM Yes Unknown, Entered By History   ferrous sulfate (FEROSUL) 325 (65 Fe) MG tablet Take 1 tablet (325 mg) by mouth daily (with breakfast) 10/6/2019 at AM Yes Christelle Lopez MD   magnesium oxide (MAG-OX) 400 MG tablet Take 400 mg by mouth daily  10/6/2019 at AM Yes Unknown, Entered By History   metoprolol tartrate (LOPRESSOR) 25 MG tablet Take 2 tablets (50 mg) by mouth 2 times daily 10/6/2019 at AM Yes Ame Chow PA-C   multivitamin, therapeutic with minerals (THERA-VIT-M) TABS tablet Take 1 tablet by mouth daily 10/5/2019 at AM Yes Alex Hale MD   ondansetron (ZOFRAN) 4 MG tablet Take 1 tablet (4 mg) by mouth every 12 hours as needed for nausea 10/5/2019 at PM Yes Ame Chow PA-C   pantoprazole (PROTONIX) 40 MG EC tablet Take 1 tablet (40 mg) by mouth 2 times daily 10/6/2019 at AM Yes Ame Chow PA-C   predniSONE (DELTASONE) 2.5 MG tablet Take two tablets (5mg) every AM and one tablet (2.5mg) every evening 10/6/2019 at AM Yes Ame Chow PA-C   senna-docusate (SENOKOT-S/PERICOLACE) 8.6-50 MG tablet Take 2 tablets by mouth 2 times daily as needed for constipation 10/6/2019 at AM Yes Christelle Lopez MD   sulfamethoxazole-trimethoprim (BACTRIM/SEPTRA) 400-80 MG tablet Take 1 tablet by mouth daily 10/6/2019 at AM Yes Alex Hale MD   tacrolimus (GENERIC EQUIVALENT) 0.5 MG capsule Take 1 capsule (0.5 mg) by mouth every morning Total dose: 2.5 mg in the AM and 2 mg in the PM  Patient taking differently: Take 0.5 mg by mouth every evening Total dose: 2 mg in the AM  and 2.5 mg in the PM 10/6/2019 at AM Yes Ame Chow PA-C   tacrolimus (GENERIC EQUIVALENT) 1 MG capsule Take 2 capsules (2 mg) by mouth 2 times daily Total dose: 2.5 mg in the AM and 2 mg in the PM  Patient taking differently: Take 2 mg by mouth 2 times daily Total dose: 2 mg in the AM and 2.5 mg in the PM 10/6/2019 at AM Yes Ame Chow PA-C   order for DME Equipment being ordered: Wilber Fernandes MD   order for DME Equipment being ordered: Wilber Fernandes MD         Medication history completed by:   Edin Fountain, PharmD Candidate 2020 October 9, 2019

## 2019-10-10 ENCOUNTER — ANESTHESIA EVENT (OUTPATIENT)
Dept: SURGERY | Facility: CLINIC | Age: 59
DRG: 205 | End: 2019-10-10
Payer: COMMERCIAL

## 2019-10-10 ENCOUNTER — PRE VISIT (OUTPATIENT)
Dept: ORTHOPEDICS | Facility: CLINIC | Age: 59
End: 2019-10-10

## 2019-10-10 ENCOUNTER — APPOINTMENT (OUTPATIENT)
Dept: MRI IMAGING | Facility: CLINIC | Age: 59
DRG: 205 | End: 2019-10-10
Attending: INTERNAL MEDICINE
Payer: COMMERCIAL

## 2019-10-10 LAB
ALBUMIN SERPL-MCNC: 2.6 G/DL (ref 3.4–5)
ALP SERPL-CCNC: 205 U/L (ref 40–150)
ALT SERPL W P-5'-P-CCNC: 18 U/L (ref 0–50)
ANION GAP SERPL CALCULATED.3IONS-SCNC: 9 MMOL/L (ref 3–14)
AST SERPL W P-5'-P-CCNC: 21 U/L (ref 0–45)
BACTERIA SPEC CULT: NORMAL
BILIRUB DIRECT SERPL-MCNC: <0.1 MG/DL (ref 0–0.2)
BILIRUB SERPL-MCNC: 0.3 MG/DL (ref 0.2–1.3)
BUN SERPL-MCNC: 16 MG/DL (ref 7–30)
CALCIUM SERPL-MCNC: 9.1 MG/DL (ref 8.5–10.1)
CHLORIDE SERPL-SCNC: 106 MMOL/L (ref 94–109)
CO2 SERPL-SCNC: 20 MMOL/L (ref 20–32)
CREAT SERPL-MCNC: 1.39 MG/DL (ref 0.52–1.04)
FUNGUS SPEC CULT: NORMAL
GALACTOMANNAN AG SERPL QL IA: POSITIVE
GALACTOMANNAN AG SERPL-ACNC: 1.13
GFR SERPL CREATININE-BSD FRML MDRD: 42 ML/MIN/{1.73_M2}
GLUCOSE SERPL-MCNC: 92 MG/DL (ref 70–99)
POTASSIUM SERPL-SCNC: 5 MMOL/L (ref 3.4–5.3)
PROT SERPL-MCNC: 7.5 G/DL (ref 6.8–8.8)
SODIUM SERPL-SCNC: 135 MMOL/L (ref 133–144)
SPECIMEN SOURCE: NORMAL
SPECIMEN SOURCE: NORMAL
TACROLIMUS BLD-MCNC: 5.3 UG/L (ref 5–15)
TME LAST DOSE: NORMAL H

## 2019-10-10 PROCEDURE — 93005 ELECTROCARDIOGRAM TRACING: CPT

## 2019-10-10 PROCEDURE — 71552 MRI CHEST W/O & W/DYE: CPT

## 2019-10-10 PROCEDURE — 25000132 ZZH RX MED GY IP 250 OP 250 PS 637: Performed by: PHYSICIAN ASSISTANT

## 2019-10-10 PROCEDURE — 25500064 ZZH RX 255 OP 636: Performed by: INTERNAL MEDICINE

## 2019-10-10 PROCEDURE — 80076 HEPATIC FUNCTION PANEL: CPT | Performed by: INTERNAL MEDICINE

## 2019-10-10 PROCEDURE — 36415 COLL VENOUS BLD VENIPUNCTURE: CPT | Performed by: INTERNAL MEDICINE

## 2019-10-10 PROCEDURE — 25000132 ZZH RX MED GY IP 250 OP 250 PS 637: Performed by: INTERNAL MEDICINE

## 2019-10-10 PROCEDURE — 25000131 ZZH RX MED GY IP 250 OP 636 PS 637: Performed by: INTERNAL MEDICINE

## 2019-10-10 PROCEDURE — 25000128 H RX IP 250 OP 636: Performed by: INTERNAL MEDICINE

## 2019-10-10 PROCEDURE — 99232 SBSQ HOSP IP/OBS MODERATE 35: CPT | Performed by: INTERNAL MEDICINE

## 2019-10-10 PROCEDURE — 93010 ELECTROCARDIOGRAM REPORT: CPT | Performed by: INTERNAL MEDICINE

## 2019-10-10 PROCEDURE — 25000131 ZZH RX MED GY IP 250 OP 636 PS 637: Performed by: NURSE PRACTITIONER

## 2019-10-10 PROCEDURE — 25000131 ZZH RX MED GY IP 250 OP 636 PS 637: Performed by: PHYSICIAN ASSISTANT

## 2019-10-10 PROCEDURE — 40000141 ZZH STATISTIC PERIPHERAL IV START W/O US GUIDANCE

## 2019-10-10 PROCEDURE — 80197 ASSAY OF TACROLIMUS: CPT | Performed by: NURSE PRACTITIONER

## 2019-10-10 PROCEDURE — 12000012 ZZH R&B MS OVERFLOW UMMC

## 2019-10-10 PROCEDURE — 80048 BASIC METABOLIC PNL TOTAL CA: CPT | Performed by: INTERNAL MEDICINE

## 2019-10-10 PROCEDURE — A9585 GADOBUTROL INJECTION: HCPCS | Performed by: INTERNAL MEDICINE

## 2019-10-10 RX ORDER — GADOBUTROL 604.72 MG/ML
7.5 INJECTION INTRAVENOUS ONCE
Status: COMPLETED | OUTPATIENT
Start: 2019-10-10 | End: 2019-10-10

## 2019-10-10 RX ORDER — LABETALOL 20 MG/4 ML (5 MG/ML) INTRAVENOUS SYRINGE
10 EVERY 4 HOURS PRN
Status: DISCONTINUED | OUTPATIENT
Start: 2019-10-10 | End: 2019-10-12 | Stop reason: HOSPADM

## 2019-10-10 RX ORDER — VORICONAZOLE 200 MG/1
200 TABLET, FILM COATED ORAL EVERY 12 HOURS SCHEDULED
Status: DISCONTINUED | OUTPATIENT
Start: 2019-10-11 | End: 2019-10-12 | Stop reason: HOSPADM

## 2019-10-10 RX ORDER — ISOSORBIDE MONONITRATE 30 MG/1
30 TABLET, EXTENDED RELEASE ORAL ONCE
Status: COMPLETED | OUTPATIENT
Start: 2019-10-10 | End: 2019-10-10

## 2019-10-10 RX ORDER — ISOSORBIDE MONONITRATE 60 MG/1
60 TABLET, EXTENDED RELEASE ORAL DAILY
Status: DISCONTINUED | OUTPATIENT
Start: 2019-10-11 | End: 2019-10-11

## 2019-10-10 RX ADMIN — AMLODIPINE BESYLATE 10 MG: 10 TABLET ORAL at 08:49

## 2019-10-10 RX ADMIN — Medication 1 TABLET: at 08:49

## 2019-10-10 RX ADMIN — ISOSORBIDE MONONITRATE 30 MG: 30 TABLET, EXTENDED RELEASE ORAL at 08:49

## 2019-10-10 RX ADMIN — MAGNESIUM OXIDE TAB 400 MG (241.3 MG ELEMENTAL MG) 800 MG: 400 (241.3 MG) TAB at 08:49

## 2019-10-10 RX ADMIN — GADOBUTROL 6 ML: 604.72 INJECTION INTRAVENOUS at 18:38

## 2019-10-10 RX ADMIN — VORICONAZOLE 300 MG: 200 TABLET, FILM COATED ORAL at 13:33

## 2019-10-10 RX ADMIN — MIRTAZAPINE 15 MG: 15 TABLET, ORALLY DISINTEGRATING ORAL at 21:00

## 2019-10-10 RX ADMIN — ISOSORBIDE MONONITRATE 30 MG: 30 TABLET, EXTENDED RELEASE ORAL at 13:33

## 2019-10-10 RX ADMIN — VORICONAZOLE 350 MG: 200 TABLET, FILM COATED ORAL at 20:56

## 2019-10-10 RX ADMIN — LABETALOL 20 MG/4 ML (5 MG/ML) INTRAVENOUS SYRINGE 10 MG: at 16:48

## 2019-10-10 RX ADMIN — PANTOPRAZOLE SODIUM 40 MG: 40 TABLET, DELAYED RELEASE ORAL at 08:49

## 2019-10-10 RX ADMIN — MULTIPLE VITAMINS W/ MINERALS TAB 1 TABLET: TAB at 08:49

## 2019-10-10 RX ADMIN — SODIUM CHLORIDE 500 ML: 9 INJECTION, SOLUTION INTRAVENOUS at 16:50

## 2019-10-10 RX ADMIN — SENNOSIDES AND DOCUSATE SODIUM 2 TABLET: 8.6; 5 TABLET ORAL at 20:57

## 2019-10-10 RX ADMIN — TACROLIMUS 2.5 MG: 1 CAPSULE ORAL at 08:48

## 2019-10-10 RX ADMIN — PREDNISONE 5 MG: 5 TABLET ORAL at 08:49

## 2019-10-10 RX ADMIN — PANTOPRAZOLE SODIUM 40 MG: 40 TABLET, DELAYED RELEASE ORAL at 20:57

## 2019-10-10 RX ADMIN — SODIUM CHLORIDE 500 ML: 9 INJECTION, SOLUTION INTRAVENOUS at 18:45

## 2019-10-10 RX ADMIN — TACROLIMUS 2.5 MG: 1 CAPSULE ORAL at 18:44

## 2019-10-10 RX ADMIN — SENNOSIDES AND DOCUSATE SODIUM 2 TABLET: 8.6; 5 TABLET ORAL at 08:49

## 2019-10-10 RX ADMIN — METOPROLOL TARTRATE 50 MG: 25 TABLET ORAL at 08:48

## 2019-10-10 RX ADMIN — FERROUS SULFATE TAB 325 MG (65 MG ELEMENTAL FE) 325 MG: 325 (65 FE) TAB at 08:49

## 2019-10-10 RX ADMIN — METOPROLOL TARTRATE 50 MG: 25 TABLET ORAL at 20:56

## 2019-10-10 RX ADMIN — SULFAMETHOXAZOLE AND TRIMETHOPRIM 1 TABLET: 400; 80 TABLET ORAL at 08:49

## 2019-10-10 RX ADMIN — PREDNISONE 2.5 MG: 2.5 TABLET ORAL at 21:00

## 2019-10-10 ASSESSMENT — ACTIVITIES OF DAILY LIVING (ADL)
ADLS_ACUITY_SCORE: 11

## 2019-10-10 ASSESSMENT — MIFFLIN-ST. JEOR: SCORE: 1133.83

## 2019-10-10 NOTE — ANESTHESIA PREPROCEDURE EVALUATION
Anesthesia Pre-Procedure Evaluation    Patient: Kecia Blue   MRN:     [id number] Gender:   female   Age:    58 year old :      1960        Preoperative Diagnosis: Bronchial Stenosis   Procedure(s):  Flexible Bronchoscopy, Balloon Dilation     Past Medical History:   Diagnosis Date     Antisynthetase syndrome (H)      Chronic cough      Chronic infection     recent C diff       Dehydration 2018     Dermatomyositis (H)      Dysplasia of cervix, low grade (ESTRADA 1)      ILD (interstitial lung disease) (H)      Osteopenia      PONV (postoperative nausea and vomiting)      Pulmonary hypertension (H)      Raynaud's disease      Seronegative rheumatoid arthritis (H)       Past Surgical History:   Procedure Laterality Date     BRONCHOSCOPY (RIGID OR FLEXIBLE), DIAGNOSTIC N/A 4/10/2018    Procedure: COMBINED BRONCHOSCOPY (RIGID OR FLEXIBLE), LAVAGE;;  Surgeon: Mariposa Donohue MD;  Location: [facility]     BRONCHOSCOPY (RIGID OR FLEXIBLE), DILATE BRONCHUS / TRACHEA N/A 10/11/2018    Procedure: BRONCHOSCOPY (RIGID OR FLEXIBLE), DILATE BRONCHUS / TRACHEA;  Flexible And Rigid Bronchoscopy and Dilation;  Surgeon: Wilber Lin MD;  Location: [facility]     BRONCHOSCOPY FLEXIBLE N/A 3/13/2018    Procedure: BRONCHOSCOPY FLEXIBLE;  Flexible Bronchoscopy ;  Surgeon: Gissell Sanchez MD;  Location: [facility]     BRONCHOSCOPY FLEXIBLE N/A 2018    Procedure: BRONCHOSCOPY FLEXIBLE;;  Surgeon: Wilber Lin MD;  Location: [facility]     BRONCHOSCOPY FLEXIBLE AND RIGID N/A 9/10/2018    Procedure: BRONCHOSCOPY FLEXIBLE AND RIGID;  Flexible and Rigid Bronchoscopy with Balloon Dilation, tissue debulking with cryo, and Right mainstem bronchus stent placement;  Surgeon: Wilber Lin MD;  Location: [facility]     BRONCHOSCOPY RIGID N/A 2018    Procedure: BRONCHOSCOPY RIGID;;  Surgeon: Lopez Macias MD;  Location: [facility]     BRONCHOSCOPY, DILATE BRONCHUS, STENT BRONCHUS, COMBINED N/A  6/11/2018    Procedure: COMBINED BRONCHOSCOPY, DILATE BRONCHUS, STENT BRONCHUS;  Flexible Bronchoscopy, Balloon Dilation, Bronchial Washings;  Surgeon: Wilber Lin MD;  Location: [facility]     BRONCHOSCOPY, DILATE BRONCHUS, STENT BRONCHUS, COMBINED Right 7/10/2018    Procedure: COMBINED BRONCHOSCOPY, DILATE BRONCHUS, STENT BRONCHUS;  Flexible Bronchoscopy, Balloon Dilation, Bronchial Washings  ;  Surgeon: Wilber Lin MD;  [facility]: [facility]     BRONCHOSCOPY, DILATE BRONCHUS, STENT BRONCHUS, COMBINED N/A 8/2/2018    Procedure: COMBINED BRONCHOSCOPY, DILATE BRONCHUS, STENT BRONCHUS;  Flexible Bronchoscopy, Bronchial Washings, Balloon Dilation;  Surgeon: Wilber Lin MD;  [facility]: [facility]     BRONCHOSCOPY, DILATE BRONCHUS, STENT BRONCHUS, COMBINED N/A 8/20/2018    Procedure: COMBINED BRONCHOSCOPY, DILATE BRONCHUS, STENT BRONCHUS;  Flexible Bronchoscopy, Balloon Dilation;  Surgeon: Wilber Lin MD;  Location: [facility]     BRONCHOSCOPY, DILATE BRONCHUS, STENT BRONCHUS, COMBINED N/A 10/29/2018    Procedure: Flexible Bronchoscopy, Balloon Dilation, Stent Revision, Airway Examination And Therapeutic Suctioning, Cyro Tumor Debulking;  Surgeon: Wilber Lin MD;  Location: [facility]     BRONCHOSCOPY, DILATE BRONCHUS, STENT BRONCHUS, COMBINED N/A 11/20/2018    Procedure: Rigid Bronchoscopy, Stent Removal and dilitation;  Surgeon: Wilber Lin MD;  Location: [facility]     BRONCHOSCOPY, DILATE BRONCHUS, STENT BRONCHUS, COMBINED N/A 12/14/2018    Procedure: Flexible And Rigid Bronchoscopy, Balloon Dilation, Bronchial Washing;  Surgeon: Wilber Lin MD;  Location: [facility]     BRONCHOSCOPY, DILATE BRONCHUS, STENT BRONCHUS, COMBINED N/A 1/17/2019    Procedure: Flexible And Rigid Bronchoscopy, Balloon Dilation.;  Surgeon: Wilber Lin MD;  Location: [facility]     BRONCHOSCOPY, DILATE BRONCHUS, STENT BRONCHUS, COMBINED N/A 3/7/2019    Procedure: Flexible and Rigid Bronchoscopy,  Bronchial Washing, Balloon Dilation;  Surgeon: Wilber Lin MD;  [facility]: [facility]     BRONCHOSCOPY, DILATE BRONCHUS, STENT BRONCHUS, COMBINED N/A 6/6/2019    Procedure: Rigid and Flexible Bronchoscopy, Balloon Dilation;  Surgeon: Wilber Lin MD;  Location: [facility]     ENT SURGERY      tonsillectomy as a child     ESOPHAGOSCOPY, GASTROSCOPY, DUODENOSCOPY (EGD), COMBINED N/A 10/29/2018    Procedure: COMBINED ESOPHAGOSCOPY, GASTROSCOPY, DUODENOSCOPY (EGD) with biopsies and polypectomy;  Surgeon: Chente Bloom MD;  Location: [facility]     INSERT EXTRACORPORAL MEMBRANE OXYGENATOR ADULT (OUTSIDE OR) N/A 2/27/2018    Procedure: INSERT EXTRACORPORAL MEMBRANE OXYGENATOR ADULT (OUTSIDE OR);  INSERT EXTRACORPORAL MEMBRANE OXYGENATOR ADULT (OUTSIDE OR) ;  Surgeon: Toby Hernandez MD;  Location: [facility]     IR THORACENTESIS  9/13/2019     no prior surgery       REMOVE EXTRACORPORAL MEMBRANE OXYGENATOR ADULT N/A 3/3/2018    Procedure: REMOVE EXTRACORPORAL MEMBRANE OXYGENATOR ADULT;  Removal of Right Femoral Venous and Right Axillary Arterial Extracorporeal Membrane Oxygenator;  Surgeon: Toby Hernandez MD;  Location: [facility]     TRANSPLANT LUNG RECIPIENT SINGLE X2 Bilateral 3/1/2018    Procedure: TRANSPLANT LUNG RECIPIENT SINGLE X2;  Median Sternotomy, Extracorporeal Membrane Oxygenator, bilateral sequential lung transplant;  Surgeon: Toby Hernandez MD;  Location: [facility]          Anesthesia Evaluation     . Pt has had prior anesthetic. Type: General    History of anesthetic complications   - PONV        ROS/MED HX    ENT/Pulmonary:     (+), . Other pulmonary disease 3/1/18: lung transplant for interstitial lung dz; numerous flex, rigid, dilations since.    Neurologic:  - neg neurologic ROS     Cardiovascular:  - neg cardiovascular ROS   (+) ----. : . . . :. . pulmonary hypertension, Previous cardiac testing date:results:date: results:ECG reviewed date:6-6-19 results:Normal  sinus rhythm  Voltage criteria for left ventricular hypertrophy  Nonspecific T wave abnormality  Abnormal ECG  When compared with ECG of 15-APR-2018 06:37,  Premature atrial complexes are no longer Present  Incomplete right bundle branch block is no longer Present date: results:          METS/Exercise Tolerance:     Hematologic:     (+) History of blood clots pt is not anticoagulated, Anemia, -      Musculoskeletal:   (+) arthritis,  other musculoskeletal- Raynaud's Dz; RA      GI/Hepatic:  - neg GI/hepatic ROS       Renal/Genitourinary:     (+) chronic renal disease, type: ARF,       Endo:     (+) Chronic steroid usage for Post Transplant Immunosuppression .      Psychiatric:  - neg psychiatric ROS       Infectious Disease:   (+) MRSA, Other Infectious Disease CMV      Malignancy:      - no malignancy   Other:                         PHYSICAL EXAM:   Mental Status/Neuro: A/A/O   Airway: Facies: Feasible  Mallampati: II  Mouth/Opening: Full  TM distance: > 6 cm  Neck ROM: Full   Respiratory: Respiratory Auscultation: coarse.     Resp. Rate: Normal     Resp. Effort: Normal      CV: Rhythm: Regular  Rate: Age appropriate  Heart: Normal Sounds  Edema: None   Comments:      Dental: Normal Dentition                Lab Results   Component Value Date    WBC 9.1 10/08/2019    HGB 8.9 (L) 10/08/2019    HCT 30.6 (L) 10/08/2019     (L) 10/08/2019    CRP 25.0 (H) 10/06/2019    SED 93 (H) 10/07/2019     10/10/2019    POTASSIUM 5.0 10/10/2019    CHLORIDE 106 10/10/2019    CO2 20 10/10/2019    BUN 16 10/10/2019    CR 1.39 (H) 10/10/2019    GLC 92 10/10/2019    CHELSEY 9.1 10/10/2019    PHOS 3.9 10/06/2019    MAG 2.0 10/06/2019    ALBUMIN 2.6 (L) 10/10/2019    PROTTOTAL 7.5 10/10/2019    ALT 18 10/10/2019    AST 21 10/10/2019    ALKPHOS 205 (H) 10/10/2019    BILITOTAL 0.3 10/10/2019    LIPASE 172 10/06/2019    AMYLASE 58 02/19/2018    PTT 26 08/03/2018    INR 1.06 10/06/2019    FIBR 279 03/05/2018    TSH 1.80 08/01/2018     "HCG Negative 06/06/2019       Preop Vitals  BP Readings from Last 3 Encounters:   10/10/19 (!) 163/107   09/15/19 (!) 147/85   08/22/19 (!) 141/81    Pulse Readings from Last 3 Encounters:   10/10/19 80   09/15/19 88   08/22/19 80      Resp Readings from Last 3 Encounters:   10/10/19 16   09/15/19 16   08/22/19 16    SpO2 Readings from Last 3 Encounters:   10/10/19 92%   09/15/19 90%   08/22/19 100%      Temp Readings from Last 1 Encounters:   10/10/19 36.9  C (98.4  F) (Oral)    Ht Readings from Last 1 Encounters:   10/06/19 1.626 m (5' 4\")      Wt Readings from Last 1 Encounters:   10/10/19 55.9 kg (123 lb 3.2 oz)    Estimated body mass index is 21.15 kg/m  as calculated from the following:    Height as of 10/6/19: 1.626 m (5' 4\").    Weight as of 10/10/19: 55.9 kg (123 lb 3.2 oz).     LDA:  Peripheral IV 10/06/19 Left (Active)   Site Assessment WDL 10/10/2019  8:00 AM   Line Status Saline locked 10/10/2019  8:00 AM   Phlebitis Scale 0-->no symptoms 10/10/2019  8:00 AM   Infiltration Scale 0 10/10/2019  8:00 AM   Infiltration Site Treatment Method  None 10/10/2019  8:00 AM   Extravasation? No 10/10/2019  8:00 AM   Number of days: 4            Assessment:   ASA SCORE: 3    H&P: History and physical reviewed and following examination; no interval change.   Smoking Status:  Non-Smoker/Unknown   NPO Status: NPO Appropriate     Plan:   Anes. Type:  General   Pre-Medication: None (zofran given)   Induction:  IV (Standard)   Airway: ETT; Oral   Access/Monitoring: PIV   Maintenance: TIVA     Postop Plan:   Postop Pain: Opioids  Postop Sedation/Airway: Not planned  Disposition: Outpatient     PONV Management:   Adult Risk Factors: Female, H/o PONV or Motion Sickness, Non-Smoker, Postop Opioids   Prevention:, No Volatiles     CONSENT:      Blood Products: Consent Deferred (Minimal Blood Loss)       Comments for Plan/Consent:  ?? Rigid bronch also ??    10/29/18; 2890; Mask Ventilation: Easy; Ease of Intubation: Easy; Airway " Size: 7;  Cuffed;  Oral;  Blade Type: Walters;  Blade Size: 2;  Place by: ;  Insertion Attempts: 1;  Secured at (cm)to lip: 20 cm;  Breath Sounds: Equal, clear and bilateral;  End Tidal CO2: Present;  Dentition: Intact, Unchanged;  Grade View of Cords: 2 (clear and open)                           Yesenia Ramírez MD

## 2019-10-10 NOTE — CONSULTS
[facility]  Transplant Infectious Disease Consult Note - New Patient     Patient:  Kecia Blue, Date of birth 1960, Medical record number [id number]  Date of Visit:  10/10/2019  Consult requested by Dr. Rabago  for evaluation of empyema growing filamentous fungus.         Assessment and Recommendations:   Recommendations:  - Start voriconazole 350mg BID x 2 doses, then 250mg BID  - Please obtain MRI of chest to characterize previously seen process in 10th rib concerning for osteomyelitis  - Consider surgical evaluation for possible debridement as there is some evidence that patients with Aspergillus empyema may have improved outcomes with surgical debridement.  - Please send actinomyces rule out cultures with bronchoscopy tomorrow    Transplant ID will continue to follow.    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  After an unsuccessful attempt to access fluid under ultrasound guidance during last admission in September 2019, a CT-guided biopsy from 10/8/19 yielded purulent pleural fluid with Aspergillus fumigatus grown from cultures. A diagnosis of an invasive fungal process is also supported by positive and rising serum galactomannan and fungitell.  Notably, she has also grown Actinomyces from multiple BAL specimens in the past so we question whether a preceding Actinomyces infection could have played a contributory role in creating a structurally abnormal environment through which Aspergillus was able to invade.     Acute Infectious Disease issues include:  # L sided  empyema, possible adjacent osteomyelitis of the 10th rib, with 10/8 cultures growing Aspergillus fumigatus: Anticipate that this will require prolonged course of antifungal therapy, particularly if bone confirmed to be involved on MRI.  Surgical debridement may also be needed to help with source control.     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  Previously not associated with a clear clinical syndrome and was not specifically treated, though she did received shorter courses of Augmentin for Moraxella that would be anticipated to have activity against Actinomyces, though the duration was shorter than would be needed for true infection.     # Low level CMV viremia   Peak viral load >3M in 8/2018.  Was on ganciclovir until 7/25/19. Currently biweekly monitoring of low level CMV viremia. Patient is asymptomatic. CMV not detected on 10/6/19.     Additional/Chronic Infectious Disease issues include:    - PCP prophylaxis: bactrim single strength daily  - Serostatus: CMV D+/R+, EBV D+/R+  - Gamma globulin status: 698 on 3/1/2018  - Isolation status:  Good hand hygiene.    Patient seen and discussed with Dr. Velez.    Recommendations discussed with hospitalist and pulmonary teams.    Nancy Aguilera MD, PhD  Adult & Pediatric Infectious Diseases Fellow PGY8, [facility]  Pager: [phone]    Attending attestation:  I have seen and examined the patient today, and have reviewed all pertinent vitals, labs, imaging, notes and microbiology results.  I have discussed the patient with Dr. Aguilera and agree with her assessment and plan.     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 Pleural fluid positive for aspergillus fumigatus. Interestingly, in our literature review, most of the aspergillus empyema were secondary to a broncho-pleural fistula or contiguous from a fungal pneumonia/aspergilloma. This patient has none of the previous entities. Isolated aspergilluis empyema is very rare but still happens. All this raises the question if the patient had some underlying pathology that aspergillus took advantage from like actinomyces infection; which invades through planes. Will wait for MRI to confirm/rule out osteomyelitis of the 10th rib and BAL cultures to consider adding PCN for possible actinomyces infection. For now, will continue with voriconazole, with a trough goal of 2-3. Will check levels in a week. Check tacrolimus levels closely while on azoles.     JESSI HA MD  Transplant Infectious Diseases  Pager: [phone]          History of Infectious Disease Illness:   Kecia Blue is a  59 yo woman with history anti-synthetase/dermatomyositis who is s/p bilateral lung transplant (3/1/18) for ILD, course complicated by R main bronchial stenosis requiring repeated dilation, positive DSAs, CMV viremia s/p valganciclovir with undetectable viral loads off therapy who was re-admitted on 10/6/19 for diagnostic evaluation of 3 months of left chest/upper abdominal pain with evidence of left empyema on imaging.  ID was consulted for positive fungal culture from pleural fluid.    She was recently admitted at [facility] for same complaint from 9/11-9/15/19.   At the time of that admission, she had complained of leftsided upper abdominal / chest pain for several months. It became intolerably worse the prior week, prompting her to present to the ED. Initially imaging at outside facility was concerning for biliary pathology and she was transferred to [facility]. Initial concern was for cholecystitis / choledocholithiasis and evaluated by GI and surgery, however after additional diagnostics  she was found to have ~4cm/2.5cm empyema/abscess. She was afebrile and hemodynamically stable on admission. She received a brief course of pip-tazo from 9/12/19-9/13/19. Sampling fluid was attempted on 9/13/19 but no fluid was obtained.  She was discharged on levofloxacin to cover Stenotrophomonas from sputum.  This was changed to bactrim DS BID on 9/17 to completed a 14d course.     She describes initially feeling better but by 10/5-10/6 began having worsening cough and pain and was readmitted on 10/6.  She has not received additional animicrobials during her stay except for her prophylactic bactrim.  On 10/8, the fluid was aspirated and yielded 4 ml of purulent fluid.  Based on limited volume, only a subset of tests could be performed.  This included a fungal culture, which today is growing Aspergillus fumigatus.  She reports that her cough and pain have been better over the past few days but is occasionally productive of clear sputum.  She has occasional gagging/post-tussive emesis with the cough.  She fluctuating constipation and has not had a bowel movement for several day.  She denies any fevers, hemoptysis, rashes, muscle or joint symptoms.    She lives with her  on a farm near [place], [place].  They have grains and soybeans on the farm but no animals and she does not personally like to be outside.  Nearby children also have a farm and they have cattle.      Antibiotics  Bactrim: long-standing use for ppx, increased to DS BID 9/17-9/30, then back to single strength daily    Past   Pip-tazo 9/12-9/13  Levofloxacin 9/15-9/17    Transplants:  3/1/2018 (Lung), Postoperative day:  588   IS: tacro (goal 8-10), pred 5/2.5; MMF on hold due to CMV viremia    Past Medical History:   Diagnosis Date     Antisynthetase syndrome (H) 2014     Chronic cough      Chronic infection     recent C diff  8/18     Dehydration 8/1/2018     Dermatomyositis (H)      Dysplasia of cervix, low grade (ESTRADA 1)      ILD (interstitial  lung disease) (H)      Osteopenia      PONV (postoperative nausea and vomiting)      Pulmonary hypertension (H)      Raynaud's disease      Seronegative rheumatoid arthritis (H)      Past Surgical History:   Procedure Laterality Date     BRONCHOSCOPY (RIGID OR FLEXIBLE), DIAGNOSTIC N/A 4/10/2018    Procedure: COMBINED BRONCHOSCOPY (RIGID OR FLEXIBLE), LAVAGE;;  Surgeon: Mariposa Donohue MD;  Location: [facility]     BRONCHOSCOPY (RIGID OR FLEXIBLE), DILATE BRONCHUS / TRACHEA N/A 10/11/2018    Procedure: BRONCHOSCOPY (RIGID OR FLEXIBLE), DILATE BRONCHUS / TRACHEA;  Flexible And Rigid Bronchoscopy and Dilation;  Surgeon: Wilber Lin MD;  Location: [facility]     BRONCHOSCOPY FLEXIBLE N/A 3/13/2018    Procedure: BRONCHOSCOPY FLEXIBLE;  Flexible Bronchoscopy ;  Surgeon: Gissell Sanchez MD;  Location: [facility]     BRONCHOSCOPY FLEXIBLE N/A 5/9/2018    Procedure: BRONCHOSCOPY FLEXIBLE;;  Surgeon: Wilber Lin MD;  Location: [facility]     BRONCHOSCOPY FLEXIBLE AND RIGID N/A 9/10/2018    Procedure: BRONCHOSCOPY FLEXIBLE AND RIGID;  Flexible and Rigid Bronchoscopy with Balloon Dilation, tissue debulking with cryo, and Right mainstem bronchus stent placement;  Surgeon: Wilber Lin MD;  Location: [facility]     BRONCHOSCOPY RIGID N/A 6/6/2018    Procedure: BRONCHOSCOPY RIGID;;  Surgeon: Lopez Macias MD;  Location: [facility]     BRONCHOSCOPY, DILATE BRONCHUS, STENT BRONCHUS, COMBINED N/A 6/11/2018    Procedure: COMBINED BRONCHOSCOPY, DILATE BRONCHUS, STENT BRONCHUS;  Flexible Bronchoscopy, Balloon Dilation, Bronchial Washings;  Surgeon: Wilber Lin MD;  Location: [facility]     BRONCHOSCOPY, DILATE BRONCHUS, STENT BRONCHUS, COMBINED Right 7/10/2018    Procedure: COMBINED BRONCHOSCOPY, DILATE BRONCHUS, STENT BRONCHUS;  Flexible Bronchoscopy, Balloon Dilation, Bronchial Washings  ;  Surgeon: Wilber Lin MD;  [facility]: [facility]     BRONCHOSCOPY, DILATE BRONCHUS, STENT BRONCHUS, COMBINED N/A  8/2/2018    Procedure: COMBINED BRONCHOSCOPY, DILATE BRONCHUS, STENT BRONCHUS;  Flexible Bronchoscopy, Bronchial Washings, Balloon Dilation;  Surgeon: Wilber Lin MD;  [facility]: [facility]     BRONCHOSCOPY, DILATE BRONCHUS, STENT BRONCHUS, COMBINED N/A 8/20/2018    Procedure: COMBINED BRONCHOSCOPY, DILATE BRONCHUS, STENT BRONCHUS;  Flexible Bronchoscopy, Balloon Dilation;  Surgeon: Wilber Lin MD;  [facility]: [facility]     BRONCHOSCOPY, DILATE BRONCHUS, STENT BRONCHUS, COMBINED N/A 10/29/2018    Procedure: Flexible Bronchoscopy, Balloon Dilation, Stent Revision, Airway Examination And Therapeutic Suctioning, Cyro Tumor Debulking;  Surgeon: Wilber Lin MD;  [facility]: [facility]     BRONCHOSCOPY, DILATE BRONCHUS, STENT BRONCHUS, COMBINED N/A 11/20/2018    Procedure: Rigid Bronchoscopy, Stent Removal and dilitation;  Surgeon: Wilber Lin MD;  [facility]: [facility]     BRONCHOSCOPY, DILATE BRONCHUS, STENT BRONCHUS, COMBINED N/A 12/14/2018    Procedure: Flexible And Rigid Bronchoscopy, Balloon Dilation, Bronchial Washing;  Surgeon: Wilber Lin MD;  [facility]: [facility]     BRONCHOSCOPY, DILATE BRONCHUS, STENT BRONCHUS, COMBINED N/A 1/17/2019    Procedure: Flexible And Rigid Bronchoscopy, Balloon Dilation.;  Surgeon: Wilber Lin MD;  [facility]: [facility]     BRONCHOSCOPY, DILATE BRONCHUS, STENT BRONCHUS, COMBINED N/A 3/7/2019    Procedure: Flexible and Rigid Bronchoscopy, Bronchial Washing, Balloon Dilation;  Surgeon: Wilber Lin MD;  [facility]: [facility]     BRONCHOSCOPY, DILATE BRONCHUS, STENT BRONCHUS, COMBINED N/A 6/6/2019    Procedure: Rigid and Flexible Bronchoscopy, Balloon Dilation;  Surgeon: Wilber Lin MD;  Location: [facility]     ENT SURGERY      tonsillectomy as a child     ESOPHAGOSCOPY, GASTROSCOPY, DUODENOSCOPY (EGD), COMBINED N/A 10/29/2018    Procedure: COMBINED ESOPHAGOSCOPY, GASTROSCOPY, DUODENOSCOPY (EGD) with biopsies and polypectomy;  Surgeon: Wolf  Chente Dickens MD;  Location: [facility]     INSERT EXTRACORPORAL MEMBRANE OXYGENATOR ADULT (OUTSIDE OR) N/A 2/27/2018    Procedure: INSERT EXTRACORPORAL MEMBRANE OXYGENATOR ADULT (OUTSIDE OR);  INSERT EXTRACORPORAL MEMBRANE OXYGENATOR ADULT (OUTSIDE OR) ;  Surgeon: Toby Hernandez MD;  Location: [facility]     IR THORACENTESIS  9/13/2019     no prior surgery       REMOVE EXTRACORPORAL MEMBRANE OXYGENATOR ADULT N/A 3/3/2018    Procedure: REMOVE EXTRACORPORAL MEMBRANE OXYGENATOR ADULT;  Removal of Right Femoral Venous and Right Axillary Arterial Extracorporeal Membrane Oxygenator;  Surgeon: Toby Hernandez MD;  Location: [facility]     TRANSPLANT LUNG RECIPIENT SINGLE X2 Bilateral 3/1/2018    Procedure: TRANSPLANT LUNG RECIPIENT SINGLE X2;  Median Sternotomy, Extracorporeal Membrane Oxygenator, bilateral sequential lung transplant;  Surgeon: Toby Hernandez MD;  Location: [facility]     Family History   Problem Relation Age of Onset     Hypertension Mother      Arthritis Mother      Pancreatic Cancer Father      Diabetes Father      Social History     Socioeconomic History     Marital status:      Spouse name: Not on file     Number of children: Not on file     Years of education: Not on file     Highest education level: Not on file   Occupational History     Not on file   Social Needs     Financial resource strain: Not on file     Food insecurity:     Worry: Not on file     Inability: Not on file     Transportation needs:     Medical: Not on file     Non-medical: Not on file   Tobacco Use     Smoking status: Never Smoker     Smokeless tobacco: Never Used   Substance and Sexual Activity     Alcohol use: No     Alcohol/week: 1.0 standard drinks     Types: 1 Glasses of wine per week     Drug use: No     Sexual activity: Not on file   Lifestyle     Physical activity:     Days per week: Not on file     Minutes per session: Not on file     Stress: Not on file   Relationships     Social connections:      Talks on phone: Not on file     Gets together: Not on file     Attends Baptism service: Not on file     Active member of club or organization: Not on file     Attends meetings of clubs or organizations: Not on file     Relationship status: Not on file     Intimate partner violence:     Fear of current or ex partner: Not on file     Emotionally abused: Not on file     Physically abused: Not on file     Forced sexual activity: Not on file   Other Topics Concern     Parent/sibling w/ CABG, MI or angioplasty before 65F 55M? No   Social History Narrative    3/6/2019 - Lives with . Has three daughters. Four grandchildren (two ). No pets. Travelled previously to [facility] [place]. Has visited [place] several times.      Immunization History   Administered Date(s) Administered     Influenza Quad, Recombinant, p-free (RIV4) 10/09/2019     Influenza Vaccine IM > 6 months Valent IIV4 10/29/2018     Pneumo Conj 13-V (2010&after) 2016     Pneumococcal 23 valent 2014     TDAP Vaccine (Boostrix) 2018     Tdap (Adult) Unspecified Formulation 2008     Zoster vaccine recombinant adjuvanted (SHINGRIX) 2019, 2019     Patient Active Problem List   Diagnosis     Acute on chronic respiratory failure with hypoxia (H)     ILD (interstitial lung disease) (H)     Lung transplant recipient (H)     Steroid-induced hyperglycemia     Deep vein thrombosis (DVT) (H) [I82.409]     Encounter for long-term (current) use of high-risk medication     Dehydration     Acute kidney injury (H)     Cytomegalovirus (CMV) viremia (H)     Nausea and vomiting     Low hemoglobin     Cholecystitis     Empyema of lung (H)     Administration of long-term prophylactic antibiotics     Abdominal pain            Review of Systems:   CONSTITUTIONAL:  No fevers or chills  EYES: negative for icterus  ENT:  negative for hearing loss, tinnitus and sore throat  RESPIRATORY:  Positive for cough with clear  sputum  CARDIOVASCULAR:  Positive for chest pain  GASTROINTESTINAL:  Positive for constipation  GENITOURINARY:  negative for dysuria  HEME:  No easy bruising  INTEGUMENT:  negative for rash and pruritus  NEURO:  Negative for headache         Current Medications (antimicrobials listed in bold):       amLODIPine  10 mg Oral Daily     calcium carbonate 600 mg-vitamin D 400 units  1 tablet Oral Daily     ferrous sulfate  325 mg Oral Daily with breakfast     [START ON 10/11/2019] isosorbide mononitrate  60 mg Oral Daily     magnesium oxide  800 mg Oral Daily     metoprolol tartrate  50 mg Oral BID     mirtazapine  15 mg Orally disintegrating tablet At Bedtime     multivitamin w/minerals  1 tablet Oral Daily     pantoprazole  40 mg Oral BID     predniSONE  2.5 mg Oral At Bedtime     predniSONE  5 mg Oral QAM     senna-docusate  1 tablet Oral BID    Or     senna-docusate  2 tablet Oral BID     sodium chloride (PF)  3 mL Intracatheter Q8H     sulfamethoxazole-trimethoprim  1 tablet Oral Daily     tacrolimus  2.5 mg Oral BID IS     Infusions:    no pre procedure antibiotic needed            Allergies:   No Known Allergies       Physical Exam:   Vitals were reviewed.      Ranges for vital signs:  Temp:  [97.7  F (36.5  C)-98.7  F (37.1  C)] 98.4  F (36.9  C)  Pulse:  [78-85] 80  Heart Rate:  [66-92] 80  Resp:  [16-18] 16  BP: (163-185)/() 163/107  SpO2:  [92 %-97 %] 92 %     Physical Examination:  GENERAL:  well-developed, well-nourished, in bed in no acute distress.  HEENT:  Head is normocephalic, atraumatic   EYES:  Clear sclera  ENT:  Oropharynx is moist without exudates or ulcers. No thrush  NECK:  Supple. No cervical lymphadenopathy  LUNGS:  Clear to auscultation anteriorly.  Decreased breath sounds in left base.  CARDIOVASCULAR:  Regular rate and rhythm with no murmurs  ABDOMEN:  Normal bowel sounds, soft, round, nontender.   SKIN:  No acute rashes.   NEUROLOGIC:  Grossly nonfocal. Active x4 extremities          Laboratory Data:   Tacro   10/10/19 5.3    Microbiology    CMV PCR   8/2/18 [id number]   10/29/18 7070   1/17/19 undetectable   3/7/19 494   6/5/19 <137   9/12/19 <137   10/6/19 undetectable    Fungal ab   9/14/19 negative for Cocci, histoplasma    13BD-glucan   9/14/19 122   10/7/19 269    Galactomannan   9/14/19 1.08   10/7/19 1.13    Histoplasma antigen   9/14/19 Urine/serum negative    Blastomyces antigen   9/14/19 serum negative    Serum CrAg   9/14/19 negative    Quantiferon   9/14/19 negative    Blood   9/11/19 negative   9/12/19 negative     Pleural   9/13/19 unsuccessful in obtaining fluid   10/8/19 Aspergillus fumigatus    Bronchial   7/10/18 Actinomyces species    8/2/18 Gardnerella vaginalis, normal alo, On day 7, isolated in broth only Actinomyces species    10/29/18 Moraxella (Branhamella) catarrhalis, normal alo, no Actinomyces   11/20/18 Moraxella (Branhamella) catarrhalis, Single colony  Acinetobacter species, not baumannii,  normal alo, no Actinomyces   12/14/18 Moraxella (Branhamella) catarrhalis, Candida glabrata, Actinomyces odontolyticus    1/17/18 normal alo, Actinomyces odontolyticus   3/7/17 normal alo, No Actinomyces        Imaging:

## 2019-10-10 NOTE — PROGRESS NOTES
[facility], [facility] Progress Note - Hospitalist Service, Gold 10       Date of Admission:  10/6/2019  Assessment & Plan   Kecia Blue is a 58 year old female admitted on 10/6/2019. She has PMH of ILD s/p bilateral lung transplant (3/2018), anti-synthetase syndrome, dermatomyositis, pulmonary HTN, and chronic cough with recent admission 9/11-9/15/19 for LUUQ abdominal pain who presents to he ED for evaluation of LUQ abdominal pain. Patient admitted to Medicine for further evaluation and care.     Changes Today:    -MRI chest to r/o rib osteo  - pain free today except   - Aspergillus sp growing in cultures fluid. Started on Voriconazole on 10/10/19. EKG and LFT ordered. ID consulted.   - Remeron for appetitie and sleep- encouraged  - Bronch planned on Friday    # Acute on Chronic LUQ abdominal pain  # LLL empyema:   Presented with ~ 3 month hx of intermittent LUQ abdominal pain w/ radiation to the back. On recent admission, patient thought to have biliary source of pain, though MRCP revealed LLL empyema- confirmed by Chest CT. Thoracentesis attempt by IR unsuccessful. Etiology of empyema thought bacterial vs fungal vs atypical. ID consulted w/ plan to monitor patient clinically off of abx. (as small fluid collection and high surgical risk) and follow up with pulmonology on 10/10/19.  Blasto, crypto, coccidioides- negative. CRP on admission 25.0, VSS. LFT, Lipase wnl. No fever, chills, or elevated WBC count, pro-calcitonin negative    Antifungals started      # ILD s/p bilateral lung transplant:   No PTA O2 use. Current immunosuppression: Prednisone and tacrolimus. Bactrim prophylaxis.    - decreased tac secondary to starting azole  - Pain control w/ Oxycodone 5-10 mg q4h PRN  - Zofran PRN for nausea     # CKD IV: Cr on admission 1.71. BL appears 1.0-1.5 until ~ 6/2019 when Cr began to slowly rise.   Getting MRI- Will pre/post hydrate with fluids     # Normocytic Anemia:    Hgb 9.3 on admission. BL appears 8-9.     # Thrombocytopenia: Platelets 121 on admission, near baseline.   - Consider further w/u if continues to downtrend        # Dermatomyositis  # Seronegative RA:   No active treatments. No current symptoms.   - Monitor    # HTN: BP sable on admission.  Norvasc and metoprolol.   - Continue PTA medications. Hold parameters on metoprolol: SBP less than 100 or HR less than 60   - increased imdur  - started PRN labetalol   - May consider secondary HTN due to DC. (snores, apnea, excessive daytime sleepiness). OP sleep study    # GERD: stable. PTA Protonix   - Continue PPI      Diet: Regular Diet Adult    DVT Prophylaxis: Low Risk/Ambulatory with no VTE prophylaxis indicated  Basilio Catheter: not present  Code Status: Full Code      Disposition Plan   Expected discharge: 2 - 3 days, recommended to prior living arrangement once adequate pain management/ tolerating PO medications and plan for ?empyema established.  Entered: Clarence Rabago MD 10/10/2019, 4:05 PM     The patient's care was discussed with the Patient, Patient's Family and Pulmonary Team.    Clarence Rabago MD  Hospitalist Service, [address]  Pager: [phone]  Please see sticky note for cross cover information  ______________________________________________________________________    Interval History   Patient denies any pain  No fevers or chills  No nausea or vomiting  No bowel or urinary issues    Data reviewed today: I reviewed all medications, new labs and imaging results over the last 24 hours.     Physical Exam   Vital Signs: Temp: 98.8  F (37.1  C) Temp src: Oral BP: (!) 169/112 Pulse: 82 Heart Rate: 82 Resp: 16 SpO2: 94 % O2 Device: None (Room air)    Weight: 123 lbs 3.2 oz  Physical Exam  Constitutional:       Appearance: Normal appearance.   HENT:      Head: Normocephalic and atraumatic.      Mouth/Throat:      Mouth: Mucous membranes are moist.   Eyes:      Extraocular  Movements: Extraocular movements intact.      Pupils: Pupils are equal, round, and reactive to light.   Cardiovascular:      Rate and Rhythm: Normal rate and regular rhythm.      Heart sounds: No murmur. No friction rub. No gallop.    Abdominal:      General: Abdomen is flat. There is no distension.      Palpations: Abdomen is soft.      Tenderness: There is no tenderness. There is no guarding.   Musculoskeletal:      Comments:  wrist  nodule   Neurological:      Mental Status: She is alert.           Data   Recent Labs   Lab 10/10/19  0616 10/09/19  1040 10/08/19  0726 10/07/19  0717 10/06/19  1444   WBC  --   --  9.1 7.5 9.4   HGB  --   --  8.9* 8.2* 9.2*   MCV  --   --  94 92 92   PLT  --   --  109* 96* 121*   INR  --   --   --   --  1.06    135 136 134 134   POTASSIUM 5.0 4.5 5.0 4.9 4.4   CHLORIDE 106 108 107 104 102   CO2 20 22 22 22 21   BUN 16 18 20 22 21   CR 1.39* 1.24* 1.41* 1.69* 1.71*   ANIONGAP 9 5 8 8 11   CHELSEY 9.1 8.8 9.0 8.9 9.3   GLC 92 105* 86 69* 95   ALBUMIN 2.6*  --   --   --  3.0*   PROTTOTAL 7.5  --   --   --  8.3   BILITOTAL 0.3  --   --   --  0.4   ALKPHOS 205*  --   --   --  279*   ALT 18  --   --   --  22   AST 21  --   --   --  24   LIPASE  --   --   --   --  172   TROPI  --   --   --   --  <0.015     No results found for this or any previous visit (from the past 24 hour(s)).  Medications     no pre procedure antibiotic needed         sodium chloride 0.9%  500 mL Intravenous Once     sodium chloride 0.9%  500 mL Intravenous Once     amLODIPine  10 mg Oral Daily     calcium carbonate 600 mg-vitamin D 400 units  1 tablet Oral Daily     ferrous sulfate  325 mg Oral Daily with breakfast     [START ON 10/11/2019] isosorbide mononitrate  60 mg Oral Daily     magnesium oxide  800 mg Oral Daily     metoprolol tartrate  50 mg Oral BID     mirtazapine  15 mg Orally disintegrating tablet At Bedtime     multivitamin w/minerals  1 tablet Oral Daily     pantoprazole  40 mg Oral BID     predniSONE   2.5 mg Oral At Bedtime     predniSONE  5 mg Oral QAM     senna-docusate  1 tablet Oral BID    Or     senna-docusate  2 tablet Oral BID     sodium chloride (PF)  3 mL Intracatheter Q8H     sulfamethoxazole-trimethoprim  1 tablet Oral Daily     tacrolimus  2.5 mg Oral BID IS     [START ON 10/11/2019] voriconazole  200 mg Oral Q12H JULES     voriconazole  350 mg Oral BID

## 2019-10-10 NOTE — PLAN OF CARE
"BP (!) 169/97 (BP Location: Right arm)   Pulse 76   Temp 98.5  F (36.9  C) (Oral)   Resp 16   Ht 1.626 m (5' 4\")   Wt 56.8 kg (125 lb 3.2 oz)   LMP 06/07/2014 (Exact Date)   SpO2 94%   BMI 21.49 kg/m      Pt. hypertensive 160's/90's (pt's baseline), AOVSS, on RA. Pt. had no c/o's pain or nausea. Pt. up ad diego. Voiding spontaneously, no stools reported this shift. Left PIV saline locked.  Pt. slept well overnight. Continue to follow POC & notify team with changes.    "

## 2019-10-10 NOTE — PROGRESS NOTES
Pulmonary Medicine  Cystic Fibrosis - Lung Transplant Team  Progress Note  October 10, 2019       Patient: Kecia Blue  MRN: [id number]  : 1960 (age 58 year old)  Transplant: 3/1/2018 (Lung), POD#588  Admission date: 10/6/2019    Assessment & Plan:     Kecia Blue is a 58 year old female with a PMH of ILD s/p BSLT (3/2018), anti-synthetase syndrome, dermatomyositis, and pulmonary HTN with recent admission -9/15/19 for LUQ abdominal pain r/t empyema s/p unsuccessful thoracentesis.  She was admitted on 10/6 from the ED for evaluation of recurrence of LUQ abdominal pain.  No acute pulmonary symptoms.    Today's recommendations:  - Follow sputum culture  - Tacro level 10/10 was low but starting Vori. Repeat level on 10/14/19.  - Await over-read of chest CT  - ID consult  - Discuss with IP about BAL from left LL postr basal segment and send for actinomyces cultures in addition to rest  - Discuss with Dr. Hernandez about possible surgery  - MRI of chest today to assess for osteomyelitis.  -     S/P bilateral sequential lung transplant (BSLT) for ILD:  Last seen in pulm clinic . Most recent PFTs with FEV1 61% (), overall improving. Last DSA () negative. Recent Steno maltophilia () on sputum culture, s/p 3 week course of PO ABX (Levaquin->Bactrim). Denies dyspnea, continues to have chronic morning cough, productive for clear, thin sputum and occasionally accompanied by nausea d/t gagging.  No hypoxia, fever, or leukocytosis. Admission CXR (personally reviewed) with small right pleural effusion and retrocardiac opacities c/w atelectasis.  - Sputum culture (10/8)    Immunosuppression:  - Tacrolimus 2.5 mg BID.  Goal level 8-10 (d/t CKD). It was 5.3 (10/10/2019) as we are starting Vori will not adjust dose.  - MMF on HOLD since 18 d/t CMV viremia and leukopenia. Per Dr. Mcelroy, plan is to restart when CMV consistently negative. With recurrent cultures and now Empyema suspect it is  better off not to restart it.  - Prednisone 5 mg qAM / 2.5 mg qPM    Prophylaxis:   - Bactrim SS mg PO daily  - Pantoprazole EC 40 mg PO BID     Acute on Chronic LUQ abdominal pain:  LLL empyema: Presented with ~3 month hx of intermittent LUQ abdominal pain, increased one week PTA. No fever, or chills. Recent admission (9/11-9/15) for similar complaint, with work up including MRCP (negative) and chest CT revealing LLL empeyma.  Thoracentesis (9/13) was unsuccessful. VATS was discussed, but at that time risk was felt to outweigh benefit. Positive galactomannan and fungitell (9/17) noted, antifungal deferred given stability. CT Chest (10/1) from OSH with no change in size of empeyma. Etiology likely bacterial vs atypical bacterial vs fungal. ESR and CRP decreased from 9/13 but remain slightly elevated. Pain better controlled with current regimen, intermittent nausea persists.  - Galactomannan and fungitell (10/7) positive.  - CT guided pleural tap on 10/8 - obtained 4cc of fluid. It was positive for Aspergillus. Lab called and ordered sensitivities  - ID consulted and discussed with them - plan to start Vori 350mg BID x 2doses and then 200mg BID. Pt was advised about risk of hallucinations, sun sensitivity and skin cancer.  - Will need to get BAL from left LL postr segment to assess for possible co-existent actinomyces on cultures.   - Will discuss with CVTS about Thoracotomy and possible Elosser flap.  - MRI Of chest being obtained to assess for possible adjacent rib osteomyelitis.     Right main bronchial stenosis s/p dilatation: Follow with Dr. Lin for serial bronchoscopies with dilation. Last bronch 6/6 with dilation to BI.    - Next bronch scheduled for 10/11 with Dr. Lin, notified and will be proceeding as planned. Will aim to get BAL from left LL postr basal segment.     H/o CMV Viremia: CMV has been followed Q2 week as OP. CMV PCR has been <100 since 2/21. Valcyte was discontinued 7/25. CMV transiently  "elevated to 662 (8/20) and now again decreased. Most recent level 10/6 not detected.     Other relevent problems managed by primary team:     CKD stage IV: Cr decreased to 1.4 from 1.7 on admission. Recent baseline 1.3 - 1.5.   - Will continue to monitor tacrolimus levels  - Avoid nephrotoxins and renally dose medications.    We appreciate the excellent care provided by the [place] [id number] Medicine team. Recommendations communicated in person rounding and this note. Will continue to follow along closely, please do not hesitate to call with any questions or concerns.     Subjective & Interval History:     No acute events overnight. The cough seems to be better but still present. No hypoxia or dyspnea. Zofran partially effective at controlling nausea. Appetite decreased; eating approximately half of meals. Pain well-controlled with prn oxycodone.     Review of Systems:     C: no fever, no chills, no change in weight, no change in appetite  INTEGUMENTARY/SKIN: no rash or obvious new lesions  ENT/MOUTH: no sore throat, no sinus pain, no nasal congestion or drainage  RESP: see interval history  CV: no chest pain, no palpitations, no peripheral edema, no orthopnea  GI: see interval history  : no dysuria  MUSCULOSKELETAL: no myalgias, no arthralgias  ENDOCRINE: blood sugars with adequate control  NEURO: no headache, no numbness or tingling  PSYCHIATRIC: mood stable    Physical Exam:     Vital signs:  Temp: 98.8  F (37.1  C) Temp src: Oral BP: (!) 169/112 Pulse: 82 Heart Rate: 82 Resp: 16 SpO2: 94 % O2 Device: None (Room air)   Height: 162.6 cm (5' 4\") Weight: 55.9 kg (123 lb 3.2 oz)  I/O:     Intake/Output Summary (Last 24 hours) at 10/10/2019 1649  Last data filed at 10/10/2019 0430  Gross per 24 hour   Intake --   Output 400 ml   Net -400 ml     Constitutional: Sitting up in bed, in no apparent distress.   HEENT: Normocephalic and atraumatic. Eyes with pink conjunctivae, anicteric. Sclera white. Oral mucosa moist without " lesions. Neck supple without lymphadenopathy.   PULM: Good air flow right, Diminished in LLL. Coarse crackles LLL and left mid lung field, no rhonchi, no wheezes. Non-labored breathing on room air.  CV: Normal S1 and S2. RRR. No murmur, gallop, or rub. No peripheral edema.   ABD: Hypoactive bowel sounds, soft, nontender, nondistended.    MSK: Moves all extremities. No apparent muscle wasting.   NEURO: Alert and oriented, conversant.   SKIN: Warm, dry. No rash on limited exam.   PSYCH: Mood stable.    Lines, Drains, and Devices:  Peripheral IV 10/06/19 Left (Active)   Site Assessment WDL 10/8/2019  3:00 AM   Line Status Saline locked 10/8/2019  3:00 AM   Phlebitis Scale 0-->no symptoms 10/8/2019  3:00 AM   Infiltration Scale 0 10/8/2019  3:00 AM   Infiltration Site Treatment Method  None 10/8/2019  3:00 AM   Extravasation? No 10/8/2019  3:00 AM   Number of days: 2     Data:       LABS    CMP:   Recent Labs   Lab 10/10/19  0616 10/09/19  1040 10/08/19  0726 10/07/19  0717 10/06/19  1444    135 136 134 134   POTASSIUM 5.0 4.5 5.0 4.9 4.4   CHLORIDE 106 108 107 104 102   CO2 20 22 22 22 21   ANIONGAP 9 5 8 8 11   GLC 92 105* 86 69* 95   BUN 16 18 20 22 21   CR 1.39* 1.24* 1.41* 1.69* 1.71*   GFRESTIMATED 42* 48* 41* 33* 33*   GFRESTBLACK 49* 56* 48* 38* 38*   CHELSEY 9.1 8.8 9.0 8.9 9.3   MAG  --   --   --   --  2.0   PHOS  --   --   --   --  3.9   PROTTOTAL 7.5  --   --   --  8.3   ALBUMIN 2.6*  --   --   --  3.0*   BILITOTAL 0.3  --   --   --  0.4   ALKPHOS 205*  --   --   --  279*   AST 21  --   --   --  24   ALT 18  --   --   --  22     CBC:   Recent Labs   Lab 10/08/19  0726 10/07/19  0717 10/06/19  1444   WBC 9.1 7.5 9.4   RBC 3.27* 3.00* 3.43*   HGB 8.9* 8.2* 9.2*   HCT 30.6* 27.6* 31.6*   MCV 94 92 92   MCH 27.2 27.3 26.8   MCHC 29.1* 29.7* 29.1*   RDW 17.1* 17.2* 16.9*   * 96* 121*       INR:   Recent Labs   Lab 10/06/19  1444   INR 1.06       Glucose:   Recent Labs   Lab 10/10/19  0616 10/09/19  1040  10/08/19  0726 10/07/19  0717 10/06/19  1444   GLC 92 105* 86 69* 95       Blood Gas: No lab results found in last 7 days.    Culture Data   Recent Labs   Lab 10/08/19  1719 10/08/19  0753   CULT Aspergillus species  Speciation in progress  *  Light growth  Aspergillus fumigatus  *  Critical Value/Significant Value, preliminary result only, called to and read back by  Yenni Titus RN 10.10.19 1021. RAFAEL    Culture in progress Moderate growth  Normal alo         Virology Data:   Lab Results   Component Value Date    FLUAH1 Negative 10/07/2019    FLUAH3 Negative 10/07/2019    LZ8038 Negative 10/07/2019    IFLUB Negative 10/07/2019    RSVA Negative 10/07/2019    RSVB Negative 10/07/2019    PIV1 Negative 10/07/2019    PIV2 Negative 10/07/2019    PIV3 Negative 10/07/2019    HMPV Negative 10/07/2019    HRVS Negative 10/07/2019    ADVBE Negative 10/07/2019    ADVC Negative 10/07/2019    ADVC Negative 03/07/2019    ADVC Negative 01/17/2019       Historical CMV results (last 3 of prior testing):  Lab Results   Component Value Date    CMVQNT CMV DNA Not Detected 10/06/2019    CMVQNT <137 (A) 09/12/2019    CMVQNT <137 (A) 06/05/2019     Lab Results   Component Value Date    CMVLOG Not Calculated 10/06/2019    CMVLOG <2.1 09/12/2019    CMVLOG <2.1 06/05/2019       Urine Studies    Recent Labs   Lab Test 09/12/19  0125 08/07/19  1512   URINEPH 7.5* 7.0   NITRITE Negative Negative   LEUKEST Negative Trace*   WBCU 3 19*       Most Recent [place] Pulmonary Function Testing (FVC/FEV1 only)  FVC-Pre   Date Value Ref Range Status   08/07/2019 2.22 L    06/05/2019 2.26 L    03/05/2019 1.97 L    01/16/2019 1.92 L      FVC-%Pred-Pre   Date Value Ref Range Status   08/07/2019 67 %    06/05/2019 70 %    03/05/2019 60 %    01/16/2019 59 %      FEV1-Pre   Date Value Ref Range Status   08/07/2019 1.60 L    06/05/2019 1.65 L    03/05/2019 1.31 L    01/16/2019 1.04 L      FEV1-%Pred-Pre   Date Value Ref Range Status   08/07/2019 61 %     06/05/2019 64 %    03/05/2019 51 %    01/16/2019 40 %        IMAGING    Recent Results (from the past 48 hour(s))   XR Chest 2 Views    Narrative    Exam:  XR CHEST 2 VW, 10/6/2019 4:58 PM    History: chest pain    Comparison:  CT chest 9/12/2019, chest radiograph 9/12/2019    Findings:  PA and lateral views of the chest. Sternotomy wires and  mediastinal surgical clips. Cardiac silhouette is stable. Small right  pleural effusion. No pneumothorax. Hazy retrocardiac opacities.  Visualized upper abdomen is unremarkable.      Impression    Impression:    Small right pleural effusion with hazy retrocardiac opacities favored  to represent atelectasis, less likely infection.     I have personally reviewed the examination and initial interpretation  and I agree with the findings.    MEHNAZ CHAPMAN MD   US Lower Extremity Venous Duplex Bilateral    Narrative    EXAMINATION: Bilateral lower extremity venous Doppler ultrasound.    COMPARISON: Left lower extremity venous ultrasound on 4/9/2018    HISTORY: Please eval for DVT    FINDINGS:   The right common femoral, femoral, popliteal, and posterior tibial  veins are fully compressible with patent Doppler wave forms. No  thrombus is identified within them on grayscale imaging.    The left common femoral, femoral, popliteal, and posterior tibial  veins are fully compressible with patent Doppler wave forms. No  thrombus is identified within them on grayscale imaging.      Impression    IMPRESSION:    No deep venous thrombus demonstrated in either lower extremity.    I have personally reviewed the examination and initial interpretation  and I agree with the findings.    YAMINI GUERIN MD

## 2019-10-10 NOTE — PLAN OF CARE
"BP (!) (P) 166/109 (BP Location: Right arm)   Pulse 76   Temp 97.7  F (36.5  C) (Oral)   Resp 16   Ht 1.626 m (5' 4\")   Wt 56.8 kg (125 lb 3.2 oz)   LMP 06/07/2014 (Exact Date)   SpO2 97%   BMI 21.49 kg/m        Patient hypertensive in 160's- 170's/ 100's. Started on imdur today. OVSS on RA; afebrile. Denies pain. Tolerating regular diet with good appetite. PIV SL. Voiding adequately, no BM since 10/6, miralax available. Thora site CDI. Up walking halls with . Will continue with POC and notify MD with changes or concerns.       "

## 2019-10-10 NOTE — PLAN OF CARE
"BP (!) 163/107 (BP Location: Right arm)   Pulse 80   Temp 98.4  F (36.9  C) (Oral)   Resp 16   Ht 1.626 m (5' 4\")   Wt 55.9 kg (123 lb 3.2 oz)   LMP 06/07/2014 (Exact Date)   SpO2 92%   BMI 21.15 kg/m        Patient hypertensive in 180's/ 110's today. Team aware, Imdur increased with improvement to 160's/100's. OVSS on RA; afebrile. Denies pain. Tolerating regular diet with fair appetite. PIV SL. No BM, extra dose of Miralax given. Voiding well. Chest MRI scheduled for today to assess for osteomyelitis. Pleural cultures growing filamentous fungi, team notified in person and began vfend today. Plan for bronch tomorrow. Will continue with POC and notify MD with changes or concerns.      "

## 2019-10-11 ENCOUNTER — ANESTHESIA (OUTPATIENT)
Dept: SURGERY | Facility: CLINIC | Age: 59
DRG: 205 | End: 2019-10-11
Payer: COMMERCIAL

## 2019-10-11 ENCOUNTER — HOME INFUSION (PRE-WILLOW HOME INFUSION) (OUTPATIENT)
Dept: PHARMACY | Facility: CLINIC | Age: 59
End: 2019-10-11

## 2019-10-11 LAB
ACID FAST STN SPEC QL: NORMAL
ANION GAP SERPL CALCULATED.3IONS-SCNC: 6 MMOL/L (ref 3–14)
BUN SERPL-MCNC: 18 MG/DL (ref 7–30)
CALCIUM SERPL-MCNC: 8.6 MG/DL (ref 8.5–10.1)
CHLORIDE SERPL-SCNC: 109 MMOL/L (ref 94–109)
CO2 SERPL-SCNC: 23 MMOL/L (ref 20–32)
COPATH REPORT: NORMAL
CREAT SERPL-MCNC: 1.3 MG/DL (ref 0.52–1.04)
GFR SERPL CREATININE-BSD FRML MDRD: 46 ML/MIN/{1.73_M2}
GLUCOSE SERPL-MCNC: 87 MG/DL (ref 70–99)
GRAM STN SPEC: NORMAL
GRAM STN SPEC: NORMAL
INTERPRETATION ECG - MUSE: NORMAL
POTASSIUM SERPL-SCNC: 4.5 MMOL/L (ref 3.4–5.3)
SODIUM SERPL-SCNC: 138 MMOL/L (ref 133–144)
SPECIMEN SOURCE: NORMAL
SPECIMEN SOURCE: NORMAL

## 2019-10-11 PROCEDURE — 25000128 H RX IP 250 OP 636: Performed by: NURSE ANESTHETIST, CERTIFIED REGISTERED

## 2019-10-11 PROCEDURE — 87305 ASPERGILLUS AG IA: CPT | Performed by: INTERNAL MEDICINE

## 2019-10-11 PROCEDURE — 87205 SMEAR GRAM STAIN: CPT | Performed by: INTERNAL MEDICINE

## 2019-10-11 PROCEDURE — 36000059 ZZH SURGERY LEVEL 3 EA 15 ADDTL MIN UMMC: Performed by: INTERNAL MEDICINE

## 2019-10-11 PROCEDURE — 25000125 ZZHC RX 250: Performed by: NURSE ANESTHETIST, CERTIFIED REGISTERED

## 2019-10-11 PROCEDURE — 80048 BASIC METABOLIC PNL TOTAL CA: CPT | Performed by: INTERNAL MEDICINE

## 2019-10-11 PROCEDURE — 25000128 H RX IP 250 OP 636: Performed by: HOSPITALIST

## 2019-10-11 PROCEDURE — 87107 FUNGI IDENTIFICATION MOLD: CPT | Performed by: INTERNAL MEDICINE

## 2019-10-11 PROCEDURE — 87076 CULTURE ANAEROBE IDENT EACH: CPT | Performed by: NURSE PRACTITIONER

## 2019-10-11 PROCEDURE — 87015 SPECIMEN INFECT AGNT CONCNTJ: CPT | Performed by: INTERNAL MEDICINE

## 2019-10-11 PROCEDURE — 25000131 ZZH RX MED GY IP 250 OP 636 PS 637: Performed by: NURSE PRACTITIONER

## 2019-10-11 PROCEDURE — 25000132 ZZH RX MED GY IP 250 OP 250 PS 637: Performed by: INTERNAL MEDICINE

## 2019-10-11 PROCEDURE — 87102 FUNGUS ISOLATION CULTURE: CPT | Performed by: INTERNAL MEDICINE

## 2019-10-11 PROCEDURE — 99232 SBSQ HOSP IP/OBS MODERATE 35: CPT | Performed by: INTERNAL MEDICINE

## 2019-10-11 PROCEDURE — 87081 CULTURE SCREEN ONLY: CPT | Performed by: NURSE PRACTITIONER

## 2019-10-11 PROCEDURE — 87116 MYCOBACTERIA CULTURE: CPT | Performed by: INTERNAL MEDICINE

## 2019-10-11 PROCEDURE — C1726 CATH, BAL DIL, NON-VASCULAR: HCPCS | Performed by: INTERNAL MEDICINE

## 2019-10-11 PROCEDURE — 25000128 H RX IP 250 OP 636: Performed by: INTERNAL MEDICINE

## 2019-10-11 PROCEDURE — 25000131 ZZH RX MED GY IP 250 OP 636 PS 637: Performed by: PHYSICIAN ASSISTANT

## 2019-10-11 PROCEDURE — 25800030 ZZH RX IP 258 OP 636: Performed by: NURSE ANESTHETIST, CERTIFIED REGISTERED

## 2019-10-11 PROCEDURE — 0B738ZZ DILATION OF RIGHT MAIN BRONCHUS, VIA NATURAL OR ARTIFICIAL OPENING ENDOSCOPIC: ICD-10-PCS | Performed by: INTERNAL MEDICINE

## 2019-10-11 PROCEDURE — 25000131 ZZH RX MED GY IP 250 OP 636 PS 637: Performed by: INTERNAL MEDICINE

## 2019-10-11 PROCEDURE — 27210794 ZZH OR GENERAL SUPPLY STERILE: Performed by: INTERNAL MEDICINE

## 2019-10-11 PROCEDURE — 88108 CYTOPATH CONCENTRATE TECH: CPT | Performed by: INTERNAL MEDICINE

## 2019-10-11 PROCEDURE — 36000061 ZZH SURGERY LEVEL 3 W FLUORO 1ST 30 MIN - UMMC: Performed by: INTERNAL MEDICINE

## 2019-10-11 PROCEDURE — 25000125 ZZHC RX 250: Performed by: ANESTHESIOLOGY

## 2019-10-11 PROCEDURE — 87070 CULTURE OTHR SPECIMN AEROBIC: CPT | Performed by: INTERNAL MEDICINE

## 2019-10-11 PROCEDURE — 87206 SMEAR FLUORESCENT/ACID STAI: CPT | Performed by: INTERNAL MEDICINE

## 2019-10-11 PROCEDURE — 0B9F8ZX DRAINAGE OF RIGHT LOWER LUNG LOBE, VIA NATURAL OR ARTIFICIAL OPENING ENDOSCOPIC, DIAGNOSTIC: ICD-10-PCS | Performed by: INTERNAL MEDICINE

## 2019-10-11 PROCEDURE — 37000009 ZZH ANESTHESIA TECHNICAL FEE, EACH ADDTL 15 MIN: Performed by: INTERNAL MEDICINE

## 2019-10-11 PROCEDURE — 12000012 ZZH R&B MS OVERFLOW UMMC

## 2019-10-11 PROCEDURE — 40000170 ZZH STATISTIC PRE-PROCEDURE ASSESSMENT II: Performed by: INTERNAL MEDICINE

## 2019-10-11 PROCEDURE — 88312 SPECIAL STAINS GROUP 1: CPT | Performed by: INTERNAL MEDICINE

## 2019-10-11 PROCEDURE — 36415 COLL VENOUS BLD VENIPUNCTURE: CPT | Performed by: INTERNAL MEDICINE

## 2019-10-11 PROCEDURE — 71000014 ZZH RECOVERY PHASE 1 LEVEL 2 FIRST HR: Performed by: INTERNAL MEDICINE

## 2019-10-11 PROCEDURE — 37000008 ZZH ANESTHESIA TECHNICAL FEE, 1ST 30 MIN: Performed by: INTERNAL MEDICINE

## 2019-10-11 PROCEDURE — 25000132 ZZH RX MED GY IP 250 OP 250 PS 637: Performed by: PHYSICIAN ASSISTANT

## 2019-10-11 RX ORDER — SODIUM CHLORIDE, SODIUM LACTATE, POTASSIUM CHLORIDE, CALCIUM CHLORIDE 600; 310; 30; 20 MG/100ML; MG/100ML; MG/100ML; MG/100ML
INJECTION, SOLUTION INTRAVENOUS CONTINUOUS PRN
Status: DISCONTINUED | OUTPATIENT
Start: 2019-10-11 | End: 2019-10-11

## 2019-10-11 RX ORDER — IPRATROPIUM BROMIDE AND ALBUTEROL SULFATE 2.5; .5 MG/3ML; MG/3ML
3 SOLUTION RESPIRATORY (INHALATION)
Status: COMPLETED | OUTPATIENT
Start: 2019-10-11 | End: 2019-10-11

## 2019-10-11 RX ORDER — PROPOFOL 10 MG/ML
INJECTION, EMULSION INTRAVENOUS CONTINUOUS PRN
Status: DISCONTINUED | OUTPATIENT
Start: 2019-10-11 | End: 2019-10-11

## 2019-10-11 RX ORDER — METOPROLOL TARTRATE 1 MG/ML
1-2 INJECTION, SOLUTION INTRAVENOUS EVERY 5 MIN PRN
Status: DISCONTINUED | OUTPATIENT
Start: 2019-10-11 | End: 2019-10-11 | Stop reason: HOSPADM

## 2019-10-11 RX ORDER — ALBUTEROL SULFATE 0.83 MG/ML
2.5 SOLUTION RESPIRATORY (INHALATION) EVERY 4 HOURS PRN
Status: DISCONTINUED | OUTPATIENT
Start: 2019-10-11 | End: 2019-10-11 | Stop reason: HOSPADM

## 2019-10-11 RX ORDER — CARVEDILOL 12.5 MG/1
12.5 TABLET ORAL 2 TIMES DAILY WITH MEALS
Status: DISCONTINUED | OUTPATIENT
Start: 2019-10-11 | End: 2019-10-12

## 2019-10-11 RX ORDER — ONDANSETRON 2 MG/ML
4 INJECTION INTRAMUSCULAR; INTRAVENOUS EVERY 30 MIN PRN
Status: DISCONTINUED | OUTPATIENT
Start: 2019-10-11 | End: 2019-10-11 | Stop reason: HOSPADM

## 2019-10-11 RX ORDER — DIMENHYDRINATE 50 MG/ML
25 INJECTION, SOLUTION INTRAMUSCULAR; INTRAVENOUS
Status: DISCONTINUED | OUTPATIENT
Start: 2019-10-11 | End: 2019-10-11 | Stop reason: HOSPADM

## 2019-10-11 RX ORDER — ONDANSETRON 4 MG/1
4 TABLET, ORALLY DISINTEGRATING ORAL EVERY 30 MIN PRN
Status: DISCONTINUED | OUTPATIENT
Start: 2019-10-11 | End: 2019-10-11 | Stop reason: HOSPADM

## 2019-10-11 RX ORDER — HYDRALAZINE HYDROCHLORIDE 20 MG/ML
2.5-5 INJECTION INTRAMUSCULAR; INTRAVENOUS EVERY 10 MIN PRN
Status: DISCONTINUED | OUTPATIENT
Start: 2019-10-11 | End: 2019-10-11 | Stop reason: HOSPADM

## 2019-10-11 RX ORDER — MEPERIDINE HYDROCHLORIDE 25 MG/ML
12.5 INJECTION INTRAMUSCULAR; INTRAVENOUS; SUBCUTANEOUS
Status: DISCONTINUED | OUTPATIENT
Start: 2019-10-11 | End: 2019-10-11 | Stop reason: HOSPADM

## 2019-10-11 RX ORDER — FENTANYL CITRATE 50 UG/ML
25-50 INJECTION, SOLUTION INTRAMUSCULAR; INTRAVENOUS
Status: DISCONTINUED | OUTPATIENT
Start: 2019-10-11 | End: 2019-10-11 | Stop reason: HOSPADM

## 2019-10-11 RX ORDER — FENTANYL CITRATE 50 UG/ML
25-50 INJECTION, SOLUTION INTRAMUSCULAR; INTRAVENOUS
Status: CANCELLED | OUTPATIENT
Start: 2019-10-11

## 2019-10-11 RX ORDER — LIDOCAINE 40 MG/G
CREAM TOPICAL
Status: CANCELLED | OUTPATIENT
Start: 2019-10-11

## 2019-10-11 RX ORDER — NALOXONE HYDROCHLORIDE 0.4 MG/ML
INJECTION, SOLUTION INTRAMUSCULAR; INTRAVENOUS; SUBCUTANEOUS PRN
Status: DISCONTINUED | OUTPATIENT
Start: 2019-10-11 | End: 2019-10-11

## 2019-10-11 RX ORDER — SODIUM CHLORIDE, SODIUM LACTATE, POTASSIUM CHLORIDE, CALCIUM CHLORIDE 600; 310; 30; 20 MG/100ML; MG/100ML; MG/100ML; MG/100ML
INJECTION, SOLUTION INTRAVENOUS CONTINUOUS
Status: CANCELLED | OUTPATIENT
Start: 2019-10-11

## 2019-10-11 RX ORDER — FENTANYL CITRATE 50 UG/ML
INJECTION, SOLUTION INTRAMUSCULAR; INTRAVENOUS PRN
Status: DISCONTINUED | OUTPATIENT
Start: 2019-10-11 | End: 2019-10-11

## 2019-10-11 RX ORDER — NALOXONE HYDROCHLORIDE 0.4 MG/ML
.1-.4 INJECTION, SOLUTION INTRAMUSCULAR; INTRAVENOUS; SUBCUTANEOUS
Status: DISCONTINUED | OUTPATIENT
Start: 2019-10-11 | End: 2019-10-11 | Stop reason: HOSPADM

## 2019-10-11 RX ORDER — HYDROMORPHONE HYDROCHLORIDE 1 MG/ML
.3-.5 INJECTION, SOLUTION INTRAMUSCULAR; INTRAVENOUS; SUBCUTANEOUS EVERY 10 MIN PRN
Status: DISCONTINUED | OUTPATIENT
Start: 2019-10-11 | End: 2019-10-11 | Stop reason: HOSPADM

## 2019-10-11 RX ORDER — SODIUM CHLORIDE, SODIUM LACTATE, POTASSIUM CHLORIDE, CALCIUM CHLORIDE 600; 310; 30; 20 MG/100ML; MG/100ML; MG/100ML; MG/100ML
INJECTION, SOLUTION INTRAVENOUS CONTINUOUS
Status: DISCONTINUED | OUTPATIENT
Start: 2019-10-11 | End: 2019-10-11 | Stop reason: HOSPADM

## 2019-10-11 RX ORDER — TACROLIMUS 1 MG/1
2 CAPSULE ORAL
Status: DISCONTINUED | OUTPATIENT
Start: 2019-10-11 | End: 2019-10-12 | Stop reason: HOSPADM

## 2019-10-11 RX ORDER — PROPOFOL 10 MG/ML
INJECTION, EMULSION INTRAVENOUS PRN
Status: DISCONTINUED | OUTPATIENT
Start: 2019-10-11 | End: 2019-10-11

## 2019-10-11 RX ORDER — ISOSORBIDE MONONITRATE 30 MG/1
120 TABLET, EXTENDED RELEASE ORAL DAILY
Status: DISCONTINUED | OUTPATIENT
Start: 2019-10-11 | End: 2019-10-12 | Stop reason: HOSPADM

## 2019-10-11 RX ADMIN — SUGAMMADEX 110 MG: 100 INJECTION, SOLUTION INTRAVENOUS at 09:35

## 2019-10-11 RX ADMIN — ISOSORBIDE MONONITRATE 120 MG: 60 TABLET, EXTENDED RELEASE ORAL at 14:49

## 2019-10-11 RX ADMIN — ONDANSETRON 4 MG: 2 INJECTION INTRAMUSCULAR; INTRAVENOUS at 07:30

## 2019-10-11 RX ADMIN — TACROLIMUS 2 MG: 1 CAPSULE ORAL at 18:35

## 2019-10-11 RX ADMIN — NALOXONE HYDROCHLORIDE 0.04 MCG: 0.4 INJECTION, SOLUTION INTRAMUSCULAR; INTRAVENOUS; SUBCUTANEOUS at 10:00

## 2019-10-11 RX ADMIN — CARVEDILOL 12.5 MG: 12.5 TABLET, FILM COATED ORAL at 18:35

## 2019-10-11 RX ADMIN — PROPOFOL 150 MG: 10 INJECTION, EMULSION INTRAVENOUS at 09:14

## 2019-10-11 RX ADMIN — CARVEDILOL 12.5 MG: 12.5 TABLET, FILM COATED ORAL at 14:30

## 2019-10-11 RX ADMIN — LABETALOL 20 MG/4 ML (5 MG/ML) INTRAVENOUS SYRINGE 10 MG: at 05:51

## 2019-10-11 RX ADMIN — FERROUS SULFATE TAB 325 MG (65 MG ELEMENTAL FE) 325 MG: 325 (65 FE) TAB at 12:04

## 2019-10-11 RX ADMIN — TACROLIMUS 2.5 MG: 1 CAPSULE ORAL at 08:30

## 2019-10-11 RX ADMIN — SODIUM CHLORIDE, POTASSIUM CHLORIDE, SODIUM LACTATE AND CALCIUM CHLORIDE: 600; 310; 30; 20 INJECTION, SOLUTION INTRAVENOUS at 09:09

## 2019-10-11 RX ADMIN — PANTOPRAZOLE SODIUM 40 MG: 40 TABLET, DELAYED RELEASE ORAL at 12:03

## 2019-10-11 RX ADMIN — FENTANYL CITRATE 100 MCG: 50 INJECTION, SOLUTION INTRAMUSCULAR; INTRAVENOUS at 09:14

## 2019-10-11 RX ADMIN — MAGNESIUM OXIDE TAB 400 MG (241.3 MG ELEMENTAL MG) 800 MG: 400 (241.3 MG) TAB at 12:04

## 2019-10-11 RX ADMIN — SULFAMETHOXAZOLE AND TRIMETHOPRIM 1 TABLET: 400; 80 TABLET ORAL at 08:30

## 2019-10-11 RX ADMIN — VORICONAZOLE 200 MG: 200 TABLET, FILM COATED ORAL at 08:29

## 2019-10-11 RX ADMIN — SENNOSIDES AND DOCUSATE SODIUM 2 TABLET: 8.6; 5 TABLET ORAL at 20:28

## 2019-10-11 RX ADMIN — AMLODIPINE BESYLATE 10 MG: 10 TABLET ORAL at 12:05

## 2019-10-11 RX ADMIN — MIDAZOLAM 2 MG: 1 INJECTION INTRAMUSCULAR; INTRAVENOUS at 09:06

## 2019-10-11 RX ADMIN — VORICONAZOLE 200 MG: 200 TABLET, FILM COATED ORAL at 20:28

## 2019-10-11 RX ADMIN — Medication 1 TABLET: at 12:04

## 2019-10-11 RX ADMIN — PANTOPRAZOLE SODIUM 40 MG: 40 TABLET, DELAYED RELEASE ORAL at 20:28

## 2019-10-11 RX ADMIN — PREDNISONE 2.5 MG: 2.5 TABLET ORAL at 22:08

## 2019-10-11 RX ADMIN — SENNOSIDES AND DOCUSATE SODIUM 2 TABLET: 8.6; 5 TABLET ORAL at 12:07

## 2019-10-11 RX ADMIN — IPRATROPIUM BROMIDE AND ALBUTEROL SULFATE 3 ML: .5; 3 SOLUTION RESPIRATORY (INHALATION) at 08:28

## 2019-10-11 RX ADMIN — MULTIPLE VITAMINS W/ MINERALS TAB 1 TABLET: TAB at 12:04

## 2019-10-11 RX ADMIN — MIRTAZAPINE 15 MG: 15 TABLET, ORALLY DISINTEGRATING ORAL at 22:08

## 2019-10-11 RX ADMIN — PREDNISONE 5 MG: 5 TABLET ORAL at 08:30

## 2019-10-11 RX ADMIN — PROPOFOL 200 MCG/KG/MIN: 10 INJECTION, EMULSION INTRAVENOUS at 09:15

## 2019-10-11 RX ADMIN — NALOXONE HYDROCHLORIDE 0.04 MCG: 0.4 INJECTION, SOLUTION INTRAMUSCULAR; INTRAVENOUS; SUBCUTANEOUS at 10:05

## 2019-10-11 RX ADMIN — POLYETHYLENE GLYCOL 3350 17 G: 17 POWDER, FOR SOLUTION ORAL at 16:40

## 2019-10-11 RX ADMIN — ROCURONIUM BROMIDE 50 MG: 10 INJECTION INTRAVENOUS at 09:14

## 2019-10-11 ASSESSMENT — MIFFLIN-ST. JEOR: SCORE: 1140.64

## 2019-10-11 ASSESSMENT — ACTIVITIES OF DAILY LIVING (ADL)
ADLS_ACUITY_SCORE: 11
ADLS_ACUITY_SCORE: 12
ADLS_ACUITY_SCORE: 11
ADLS_ACUITY_SCORE: 12
ADLS_ACUITY_SCORE: 11
ADLS_ACUITY_SCORE: 11

## 2019-10-11 NOTE — PLAN OF CARE
"BP (!) 140/88 (BP Location: Right arm)   Pulse 80   Temp 97.7  F (36.5  C) (Oral)   Resp 16   Ht 1.626 m (5' 4\")   Wt 56.6 kg (124 lb 11.2 oz)   LMP 06/07/2014 (Exact Date)   SpO2 95%   BMI 21.40 kg/m     Pt arrived to  around 1150am from Bronchoscopy balloon dialation  Neuro: A/Ox4  VSS on RA, except elevated BP and pt was given scheduled BP meds  Pain: Denies  GI: denies nausea, on regular diet. Requested Milarax for constipation  : Voiding without difficulty but not saving urine  Drains - PIV SL  Activity - SBA/independent in room  Education - medication  Plan of Care - Continue with POC  Pt's  at bedside and supportive    "

## 2019-10-11 NOTE — ANESTHESIA CARE TRANSFER NOTE
Patient: Kecia Blue    Procedure(s):  Flexible and Rigid Bronchoscopy, Balloon Dilation, Bronchoalveolar Lagave    Diagnosis: To Examine Airways and Widen Narrowing, Bronchial Stenosis  Diagnosis Additional Information: No value filed.    Anesthesia Type:   General     Note:  Airway :ETT  Patient transferred to:PACU  Comments: To PACU awake with [id number] Report: Identifed the Patient, Identified the Reponsible Provider, Reviewed the pertinent medical history, Discussed the surgical course, Reviewed Intra-OP anesthesia mangement and issues during anesthesia, Set expectations for post-procedure period and Allowed opportunity for questions and acknowledgement of understanding      Vitals: (Last set prior to Anesthesia Care Transfer)    CRNA VITALS  10/11/2019 0917 - 10/11/2019 1017      10/11/2019             NIBP:  116/75    Pulse:  84                Electronically Signed By: ADRIÁN Spears CRNA  October 11, 2019  10:19 AM

## 2019-10-11 NOTE — PROGRESS NOTES
Pulmonary Firm Brief Note   October 11, 2019       Pt. was started on voriconazole yesterday per transplant ID for Aspergillus in pleural space.  Dr. Christensen saw the patient yesterday, see full progress note for complete details.  Original plan to leave tacro dose unchanged with start of voriconazole yesterday given subtherapeutic level of 5.3.  Upon further review, will actually decrease tacro dose today from 2.5 mg BID to 2.5 mg qAM / 2 mg qPM.  Steady state level ordered for 10/14.    Pt. also underwent IP bronch today with balloon bronchoplasty.  BAL cultures requested, including actinomyces.  It appears this was not included in the analysis, so was ordered as an add on for you.      Dr. Christensen to discuss timing of VATS surgery with Dr. Hernandez.  Discharge planning pending timing of surgery - if able to schedule within the next few days then pt. will remain inpatient, but if not will discharge and schedule in the near future.    Hina Huff, DNP, APRN, CNP  Inpatient Nurse Practitioner  Pulmonary CF/Transplant  Pager [phone]

## 2019-10-11 NOTE — PROGRESS NOTES
Care Coordinator  D:    Kecia Blue is a 58 year old female with a PMH of ILD s/p BSLT (3/2018), anti-synthetase syndrome, dermatomyositis, and pulmonary HTN with recent admission 9/11-9/15/19 for LUQ abdominal pain r/t empyema s/p unsuccessful thoracentesis.  She was admitted on 10/6 from the ED for evaluation of recurrence of LUQ abdominal pain.  No acute pulmonary symptoms.   Today's recommendations:  - Follow sputum culture  - Tacro level 10/10 was low but starting Vori. Repeat level on 10/14/19.  - Await over-read of chest CT  - ID consult  - Discuss with IP about BAL from left LL postr basal segment and send for actinomyces cultures in addition to rest  - Discuss with Dr. Hernandez about possible surgery  - MRI of chest today to assess for osteomyelitis--note per Dr Christensen on 10/10.  I: Pt is currently having a bronchoscopy. Pt had fungitell on cx. Pt to have chest MRI to check rib osteomyelitis.  A: s/p lung transplant  P: I called [facility]-[phone] to check IV antifungal/IVAB coverage. Wait for [facility] to call back--pt has [employer] and has met her $6500 deductible and will have 100% coverage for home. Will follow.

## 2019-10-11 NOTE — ANESTHESIA POSTPROCEDURE EVALUATION
Anesthesia POST Procedure Evaluation    Patient: Kecia Blue   MRN:     [id number] Gender:   female   Age:    58 year old :      1960        Preoperative Diagnosis: To Examine Airways and Widen Narrowing, Bronchial Stenosis   Procedure(s):  Flexible and Rigid Bronchoscopy, Balloon Dilation, Bronchoalveolar Lagave   Postop Comments: No value filed.       Anesthesia Type:  Not documented  General    Reportable Event: NO     PAIN: Uncomplicated   Sign Out status: Comfortable, Well controlled pain     PONV: No PONV   Sign Out status:  No Nausea or Vomiting     Neuro/Psych: Uneventful perioperative course   Sign Out Status: Preoperative baseline; Age appropriate mentation     Airway/Resp.: Uneventful perioperative course   Sign Out Status: Non labored breathing, age appropriate RR; Resp. Status within EXPECTED Parameters     CV: Uneventful perioperative course   Sign Out status: Appropriate BP and perfusion indices; Appropriate HR/Rhythm     Disposition:   Sign Out in:  PACU  Disposition:  Phase II; Home  Recovery Course: Uneventful  Follow-Up: Not required           Last Anesthesia Record Vitals:  CRNA VITALS  10/11/2019 0917 - 10/11/2019 1017      10/11/2019             NIBP:  116/75    Pulse:  84          Last PACU Vitals:  Vitals Value Taken Time   /93 10/11/2019  6:35 PM   Temp 36.5  C (97.7  F) 10/11/2019  3:52 PM   Pulse 84 10/11/2019  6:35 PM   Resp 16 10/11/2019  3:52 PM   SpO2 95 % 10/11/2019  3:52 PM   Temp src     NIBP     Pulse     SpO2     Resp     Temp     Ht Rate     Temp 2           Electronically Signed By: Yesenia Ramírez MD, 2019, 6:39 PM

## 2019-10-11 NOTE — PLAN OF CARE
VS: Pt continues to be hypertensive. Labetalol given with some response. On room air.     Labs: am labs pending.     Pain/Nausea: Denies pain and nausea.     Diet: NPO since MN for bronch.     LDA: PIV saline locked.     GI: Pt reports a BM yesterday.     : Voiding, not saving.     Mobility: Pt is UAL in her room.  Pt took a chlorhexidene shower prior to going down to  OR.     Plan: Left for bronch ~ 7am.  Return to 7A after bronch.

## 2019-10-11 NOTE — PLAN OF CARE
"BP (!) 157/104 (BP Location: Right arm)   Pulse 87   Temp 98.4  F (36.9  C) (Oral)   Resp 16   Ht 1.626 m (5' 4\")   Wt 55.9 kg (123 lb 3.2 oz)   LMP 06/07/2014 (Exact Date)   SpO2 97%   BMI 21.15 kg/m      8733-3422: Pt admitted for LLQ and LLL pain. H/o bilateral lung txp 3/2018 d/t ILD & pulmonary HTN. 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 Chest MRI completed tonight to assess for bone involvement - results pending. AVSS on RA ex BP, see below.  at bedside most of the shift and supportive of cares. Vfend started tonight.    Neuro: WDL.  Cardiac: WDL ex BPs, HTN. 10 mg prn IV labetalol given x1 with minimal effect, but BP did come down to parameters. 150s-170s/90s-100s.  Resp: WDL, LS clear. Pt reported no SOB.   GI/: Last significant BM 10/6. Did get scheduled senna this evening. Pt reported one small BM today, but said it \"wasn't worth talking about.\" Voiding adequately, not saving.   Diet/Appetite: On regular diet with good appetite, no nausea. NPO @ 0000.  Endocrine: NA.  Skin: WDL. Small ecchymotic area on L back from thoracentesis.  Access: R PIV x1 SLd.  Drains: NA.  Activity: Up independently.   Pain: Denies.   Plan: Monitor lung status, await MRI results. Pt scheduled to have bronchoscopy at 0900 tomorrow. Will continue with plan of care and notify team of any changes.?    "

## 2019-10-11 NOTE — PROGRESS NOTES
[facility]  Transplant Infectious Disease Progress Note     Patient:  Kecia Blue, Date of birth 1960, Medical record number [id number]  Date of Visit:  10/11/2019         Assessment and Recommendations:   Recommendations:  - Continue voriconazole 250mg BID.  Check level on 10/18.  Goal trough 2-3.  - Close monitoring of tacrolimus levels with initiation of voriconazole  - Awaiting surgical evaluation for possible debridement as there is some evidence that patients with Aspergillus empyema may have improved outcomes with surgical debridement and MRI today confirmed concern for osteomyelitis of 10th rib.  - Awaiting results of new bronchoscopy cultures including Actinomyces rule out.  Cytology also showing septate hyphae consistent with Aspergillus growing from pleural cultures.     Transplant ID will continue to follow.     Assessment: Kecia Blue is a  57 yo female with pmh anti-synthetase/dermatomyositis who is s/p bilateral lung transplant (3/1/18) for ILD, course complicated by R main bronchial stenosis requiring repeated dilation, positive DSAs, CMV viremia s/p valganciclovir with undetectable viral loads off therapy who was re-admitted on 10/6/19 for diagnostic evaluation of 3 months of left chest/upper abdominal pain with evidence of left empyema on imaging.  After an unsuccessful attempt to access fluid under ultrasound guidance during last admission in September 2019, a CT-guided biopsy from 10/8/19 yielded purulent pleural fluid with Aspergillus fumigatus grown from cultures. A diagnosis of an invasive fungal process is also supported by positive and rising serum galactomannan and fungitell and septate hyphae seen on 10/11 BAL.  Notably, she has also grown Actinomyces from multiple BAL specimens in the past so we question whether a preceding Actinomyces infection could have played a contributory role in creating a structurally abnormal environment through which  Aspergillus was able to invade.     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  Surgical debridement may also be needed to help with source control.      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  Previously not associated with a clear clinical syndrome and was not specifically treated, though she did received shorter courses of Augmentin for Moraxella that would be anticipated to have activity against Actinomyces, though the duration was shorter than would be needed for true infection.     # Low level CMV viremia   Peak viral load >3M in 8/2018.  Was on ganciclovir until 7/25/19. Currently biweekly monitoring of low level CMV viremia. Patient is asymptomatic. CMV not detected on 10/6/19.     Additional/Chronic Infectious Disease issues include:   - PCP prophylaxis: bactrim single strength daily  - Serostatus: CMV D+/R+, EBV D+/R+  - Gamma globulin status: 698 on 3/1/2018  - Isolation status:  Good hand hygiene.     Patient seen and discussed with Dr. Ha.     Recommendations discussed with hospitalist and pulmonary teams.     Nancy Aguilera MD, PhD  Adult & Pediatric Infectious Diseases Fellow PGY8, [facility]  Pager: [phone]    Attending attestation  I have seen and examined the patient today, and have reviewed all pertinent vitals, labs, imaging, notes and microbiology results.  I have discussed the patient with Dr. Aguilera and agree with her assessment and plan.     JESSI HA MD  Transplant Infectious  Diseases   Pager: [phone]         Interval History:   Last seen by ID on 10/10.  Bronchoscopy for right bronchial balloon dilation and cultures today.  Afebrile.  Has not needed pain meds for past 24h.  Appetite good after procedures.  Had some mild visual hallucinations since start voriconazole.        Transplants:  3/1/2018 (Lung), Postoperative day:  589.  Coordinator Mikayla Latham    Review of Systems:  CONSTITUTIONAL:  No fevers or chills  EYES: negative for icterus or acute vision changes  ENT:  negative for acute hearing loss, tinnitus, sore throat  RESPIRATORY:  + cough, sputum or dyspnea  CARDIOVASCULAR: recent chest pain but not today  GASTROINTESTINAL:  negative for nausea, vomiting, diarrhea or constipation  GENITOURINARY:  negative for dysuria or hematuria  HEME:  No easy bruising or bleeding  INTEGUMENT:  negative for rash or pruritus  NEURO:  Negative for headache or tremor         Current Medications & Allergies:       amLODIPine  10 mg Oral Daily     calcium carbonate 600 mg-vitamin D 400 units  1 tablet Oral Daily     carvedilol  12.5 mg Oral BID w/meals     ferrous sulfate  325 mg Oral Daily with breakfast     isosorbide mononitrate  120 mg Oral Daily     magnesium oxide  800 mg Oral Daily     mirtazapine  15 mg Orally disintegrating tablet At Bedtime     multivitamin w/minerals  1 tablet Oral Daily     pantoprazole  40 mg Oral BID     predniSONE  2.5 mg Oral At Bedtime     predniSONE  5 mg Oral QAM     senna-docusate  1 tablet Oral BID    Or     senna-docusate  2 tablet Oral BID     sodium chloride (PF)  3 mL Intracatheter Q8H     sulfamethoxazole-trimethoprim  1 tablet Oral Daily     tacrolimus  2.5 mg Oral BID IS     voriconazole  200 mg Oral Q12H JULES       Infusions/Drips:    no pre procedure antibiotic needed         No Known Allergies         Physical Exam:   Vitals were reviewed. Ranges for vital signs:  Temp:  [97.7  F (36.5  C)-98.8  F (37.1  C)] 97.8  F (36.6  C)  Pulse:   [80-88] 80  Heart Rate:  [78-89] 80  Resp:  [16-34] 24  BP: (140-172)/() 153/103  SpO2:  [93 %-100 %] 95 %  Vitals:    10/09/19 0357 10/10/19 0350 10/11/19 0542   Weight: 56.8 kg (125 lb 3.2 oz) 55.9 kg (123 lb 3.2 oz) 56.6 kg (124 lb 11.2 oz)       Physical Examination:  GENERAL:  well-developed, well-nourished, in bed in no acute distress.  HEENT:  Head is normocephalic, atraumatic   EYES:  Clear sclera  ENT:  Oropharynx is moist without exudates or ulcers. No thrush  NECK:  Supple. No cervical lymphadenopathy  LUNGS:  Clear to auscultation anteriorly.  Decreased breath sounds in left base.  CARDIOVASCULAR:  Regular rate and rhythm with no murmurs  ABDOMEN:  Normal bowel sounds, soft, round, nontender.   SKIN:  No acute rashes.   NEUROLOGIC:  Grossly nonfocal. Active x4 extremities         Laboratory Data:   Tacro               10/10/19 5.3     Microbiology     CMV PCR               8/2/18 [id number]               10/29/18 7070               1/17/19 undetectable               3/7/19 494               6/5/19 <137               9/12/19 <137               10/6/19 undetectable     Fungal ab               9/14/19 negative for Cocci, histoplasma     13BD-glucan               9/14/19 122               10/7/19 269     Galactomannan               9/14/19 1.08               10/7/19 1.13     Histoplasma antigen               9/14/19 Urine/serum negative     Blastomyces antigen               9/14/19 serum negative     Serum CrAg               9/14/19 negative     Quantiferon               9/14/19 negative     Blood               9/11/19 negative               9/12/19 negative                 Pleural               9/13/19 unsuccessful in obtaining fluid               10/8/19 Aspergillus fumigatus     Bronchial               7/10/18 Actinomyces species                8/2/18 Gardnerella vaginalis, normal alo, On day 7, isolated in broth only Actinomyces species                10/29/18 Moraxella (Branhamella) catarrhalis,  normal alo, no Actinomyces               11/20/18 Moraxella (Branhamella) catarrhalis, Single colony  Acinetobacter species, not baumannii,  normal alo, no Actinomyces               12/14/18 Moraxella (Branhamella) catarrhalis, Candida glabrata, Actinomyces odontolyticus                1/17/18 normal alo, Actinomyces odontolyticus               3/7/17 normal alo, No Actinomyces    10/11/19 BAL cultures pending.  Cytology with branching septate hyphae (suggestive of Aspergillus) present on GMS stain.                     Imaging:         MRI 10/11/19   1. Mild marrow edema, decreased T1 signal, enhancement, and mild  diffusion restriction within the posterior left 10th rib just lateral  to the costovertebral junction, concerning for focal osteomyelitis.  2. Grossly stable thick-walled fluid collection in the medial lower  left pleural space compatible with empyema. Trace right pleural  effusion and small left pleural effusion.  3. Scattered T1 isointense mildly enhancing opacities in the posterior  left lower lobe may represent mild infection.  4. Stable gallbladder wall thickening versus pericholecystic fluid  with cholelithiasis, partially imaged.

## 2019-10-11 NOTE — PROGRESS NOTES
Therapy: iv abx  Insurance: pf1   Ded: $6500  Met: $6500    Co-Insurance: 0  Max Out of Pocket: $0  Met: $0    Please contact Intake with any questions, [phone] or In Basket pool, [facility] ([phone]).  In reference to admission date 10/06/2019 to check iv abx coverage

## 2019-10-11 NOTE — OR NURSING
Pt arrived to PACU tachipnic (RR 34), Sriram ABRAMS notified and verbalized that it could be a result of the Narcan during extubation and should resolve on its own. Will continue to monitor pt.

## 2019-10-11 NOTE — DISCHARGE INSTRUCTIONS
Post-Bronchoscopy Patient Instructions:    October 11, 2019  Kecia Blue      Your procedure (bronchoscopy with airway dilation) was completed without any immediate complications.      You may cough up scant amount of blood for the next 12-24 hours. If you have excessive cough with blood, chest pain, shortness of breath or other concerning symptoms, please report to the closest emergency room.      You may experience low grade (less than 100.5 F) fever next 24 hours for which you can take Tylenol. If the fever persists more than 24 hours contact our office or your primary care provider.      Our office (Pulmonary--[phone]) will call you to arrange repeat bronchoscopy in 4 months.      You  resume your regular diet as it was prior to procedure.      Should you have any question, please do not hesitate to call our office.      Mg Bañuelos MD, 10/11/2019, 9:43 AM  Interventional Pulmonology Fellow  Department of Pulmonary, Allergy, Critical Care & Sleep Medicine

## 2019-10-11 NOTE — OR NURSING
Sriram ABRAMS at bedside to assess pt. Verbalized a sign out and that pt can be transferred upstairs.

## 2019-10-11 NOTE — PROCEDURES
INTERVENTIONAL PULMONOLOGY       Procedure(s):    A flexible and rigid bronchoscopy   Airway exam  BAL  Balloon bronchoplasty without stent placement (1 sites)   Therapeutic suctioning (1 sites)    Procedure Date: 10/11/2019    Indication:  S/p BLT with R anastomotic stenosis    Attending of Record:  Alana Lin MD    Interventional Pulmonary Fellow   Mg Bañuelos MD     Trainees Present:   None    Medications:    General Anesthesia - See anesthesia flowsheet for details    Sedation Time:   Per Anesthesia Care Provider    Time Out:  Performed    The patient's medical record has been reviewed.  The indication for the procedure was reviewed.  The necessary history and physical examination was performed and reviewed.  The risks, benefits and alternatives of the procedure were discussed with the the patient in detail and she had the opportunity to ask questions.  I discussed in particular the potential complications including risks of minor or life-threatening bleeding and/or infection, respiratory failure, vocal cord trauma / paralysis, pneumothorax, and discomfort. Sedation risks were also discussed including abnormal heart rhythms, low blood pressure, and respiratory failure. All questions were answered to the best of my ability.  Verbal and written informed consent was obtained.  The proposed procedure and the patient's identification were verified prior to the procedure by the physician and the nurse.    The patient was assessed for the adequacy for the procedure and to receive medications.   Mental Status:  Alert and oriented x 3  Airway examination:  Class II (Complete visualization of uvula)  Pulmonary:  Clear to ausculation bilaterally  CV:  RRR, no murmurs or gallops  ASA Grade:  (III)  Severe systemic disease    After clinical evaluation and reviewing the indication, risks, alternatives and benefits of the procedure the patient was deemed to be in satisfactory condition to undergo the procedure.            A Tuberculosis risk assessment was performed:  The patient has no known RISK of Tuberculosis    The procedure was performed in a negative airflow room: The patient could not be moved to a negative airflow room because of needed OR for the procedure    Maneuvers / Procedure:      A Flexible and 8.5mm Rigid bronchoscope bronchoscope was used for the procedure. The rigid bronchoscope was inserted into the mouth. Uvula, epiglottis and vocal cords were seen. The scope was advanced turning the bevel to 90degress while passing through the cords and into the trachea.     Airway Examination: A complete airway examination was performed from the distal trachea to the subsegmental level in each lobe of both lungs.  Pertinent findings include normal appearing left anastomosis without stenosis or ischemia. Moderate R, web-like/circumferential anastomotic stenosis with approximately 50% loss of cross-sectional area, abrupt taper of about 1 cm vertical length. No purulent secretions identified. The 6.2 mm bronchoscope could not be passed beyond the stenosis prior to balloon  bronchoplasty.     BAL: The bronchoscope was wedged into the posterior segment of the LLL bronchus. A total of 90 mL of saline was instilled and a total of 30 mL of cloudy fluid was aspirated. This was sent for analysis.     Airway dilation: Airway dilation#1: The 8-9-10mm Elation Ballooon was used to dilate the R anastomotic stenosis. Each dilation was held for 60 sec and repeated once at 8mm, 9 mm, and 10 mm. After dilation, the degree of stenosis improved from 50% to 25% and the 6.2 mm bronchoscope could be passed with ease.     Therapeutic suctioning: 15-20min of operative time was spent clearing out the airway of debris, blood and mucous prior to the intervention.     Any disposable equipment was visually inspected and deemed to be intact immediately post procedure.      Relevant Pictures  Distal 1/3 of trachea, L mainstem at 9 o'clock, R mainstem at 3  o'clock; R anastomotic stenosis seen      50% R anastomotic stenosis      Balloon dilation R anastomotic stenosis      Improvement in R anastomotic stenosis following balloon dilation      Recommendations:     Successful rigid bronchoscopy with R anastomotic stenosis balloon bronchoplasty     Repeat bronchoscopy in 4 months          Mg Bañuelos MD, 10/11/2019, 9:47 AM  Interventional Pulmonology Fellow  Department of Pulmonary, Allergy, Critical Care & Sleep Medicine   Pager: [phone]

## 2019-10-11 NOTE — PROGRESS NOTES
[facility], [facility], [place] Progress Note - Hospitalist Service, Gold 10       Date of Admission:  10/6/2019  Assessment & Plan   Kecia Blue is a 58 year old female admitted on 10/6/2019. She has PMH of ILD s/p bilateral lung transplant (3/2018), anti-synthetase syndrome, dermatomyositis, pulmonary HTN, and chronic cough with recent admission 9/11-9/15/19 for LUUQ abdominal pain who presents to he ED for evaluation of LUQ abdominal pain. Patient admitted to Medicine for further evaluation and care.     Changes Today:    -Changed Lopressor to Coreg.  Continue Imdur but increase dose.  Continue Norvasc.   -Patient denies any pain  - Aspergillus sp growing in cultures fluid. Started on Voriconazole on 10/10/19.  EKG and LFTs reviewed.  QTC is within normal range as are LFTs.  -Status post [facility].  Pulmonology discussing timing of surgery.  Discharge planning based off of operative time course.    # Acute on Chronic LUQ abdominal pain  # LLL empyema:   Presented with ~ 3 month hx of intermittent LUQ abdominal pain w/ radiation to the back. On recent admission, patient thought to have biliary source of pain, though MRCP revealed LLL empyema- confirmed by Chest CT. Thoracentesis attempt by IR unsuccessful. Etiology of empyema thought bacterial vs fungal vs atypical. ID consulted w/ plan to monitor patient clinically off of abx. (as small fluid collection and high surgical risk) and follow up with pulmonology on 10/10/19.  Blasto, crypto, coccidioides- negative. CRP on admission 25.0, VSS. LFT, Lipase wnl. No fever, chills, or elevated WBC count, pro-calcitonin negative    Antifungals started      # ILD s/p bilateral lung transplant:   No PTA O2 use. Current immunosuppression: Prednisone and tacrolimus. Bactrim prophylaxis.    - decreased tac secondary to starting azole  - Pain control w/ Oxycodone 5-10 mg q4h PRN  - Zofran PRN for nausea     # CKD IV: Cr on admission 1.71. BL appears  "1.0-1.5 until ~ 6/2019 when Cr began to slowly rise.   Getting MRI- Will pre/post hydrate with fluids     # Normocytic Anemia:   Hgb 9.3 on admission. BL appears 8-9.     # Thrombocytopenia: Platelets 121 on admission, near baseline.   - Consider further w/u if continues to downtrend        # Dermatomyositis  # Seronegative RA:   No active treatments. No current symptoms.   - Monitor    # HTN: BP sable on admission.  Norvasc and metoprolol.   - Continue PTA medications. Hold parameters on metoprolol: SBP less than 100 or HR less than 60   - imdur coreg and norvasc  - May consider secondary HTN due to DC. (snores, apnea, excessive daytime sleepiness). OP sleep study    # GERD: stable. PTA Protonix   - Continue PPI      Diet: Regular Diet Adult    DVT Prophylaxis: Low Risk/Ambulatory with no VTE prophylaxis indicated  Basilio Catheter: not present  Code Status: Full Code      Disposition Plan   Expected discharge: 2 - 3 days, recommended to prior living arrangement once adequate pain management/ tolerating PO medications and plan for ?empyema established.  Entered: Clarence Rabago MD 10/11/2019, 4:20 PM     The patient's care was discussed with the Patient, Patient's Family and Pulmonary Team.    Clarence Rabago MD  Hospitalist Service, [address], [place]  Pager: [phone]  Please see sticky note for cross cover information  ______________________________________________________________________    Interval History   No fevers chills nausea vomiting chest pain shortness of breath or palpitations  Back pain is resolved status post thoracentesis  Patient states she feels \" woozy\" postanesthesia    Four-point ROS reviewed and is otherwise negative except for mentioned above    Data reviewed today: I reviewed all medications, new labs and imaging results over the last 24 hours.     Physical Exam   Vital Signs: Temp: 97.7  F (36.5  C) Temp src: Oral BP: (!) 140/88 Pulse: 80 Heart Rate: 88 " Resp: 16 SpO2: 95 % O2 Device: None (Room air) Oxygen Delivery: 1 LPM  Weight: 124 lbs 11.2 oz  Physical Exam  Constitutional:       Appearance: Normal appearance.   HENT:      Head: Normocephalic and atraumatic.      Mouth/Throat:      Mouth: Mucous membranes are moist.   Eyes:      Extraocular Movements: Extraocular movements intact.      Pupils: Pupils are equal, round, and reactive to light.   Cardiovascular:      Rate and Rhythm: Normal rate and regular rhythm.      Heart sounds: No murmur. No friction rub. No gallop.    Pulmonary:      Effort: Pulmonary effort is normal.      Comments: Monophonic wheeze  Abdominal:      General: Abdomen is flat. There is no distension.      Palpations: Abdomen is soft.      Tenderness: There is no tenderness. There is no guarding.   Musculoskeletal:      Comments:  wrist  nodule   Neurological:      Mental Status: She is alert.           Data   Recent Labs   Lab 10/11/19  0535 10/10/19  0616 10/09/19  1040 10/08/19  0726 10/07/19  0717 10/06/19  1444   WBC  --   --   --  9.1 7.5 9.4   HGB  --   --   --  8.9* 8.2* 9.2*   MCV  --   --   --  94 92 92   PLT  --   --   --  109* 96* 121*   INR  --   --   --   --   --  1.06    135 135 136 134 134   POTASSIUM 4.5 5.0 4.5 5.0 4.9 4.4   CHLORIDE 109 106 108 107 104 102   CO2 23 20 22 22 22 21   BUN 18 16 18 20 22 21   CR 1.30* 1.39* 1.24* 1.41* 1.69* 1.71*   ANIONGAP 6 9 5 8 8 11   CHELSEY 8.6 9.1 8.8 9.0 8.9 9.3   GLC 87 92 105* 86 69* 95   ALBUMIN  --  2.6*  --   --   --  3.0*   PROTTOTAL  --  7.5  --   --   --  8.3   BILITOTAL  --  0.3  --   --   --  0.4   ALKPHOS  --  205*  --   --   --  279*   ALT  --  18  --   --   --  22   AST  --  21  --   --   --  24   LIPASE  --   --   --   --   --  172   TROPI  --   --   --   --   --  <0.015     Recent Results (from the past 24 hour(s))   MR Chest w/o & w Contrast    Narrative    MR CHEST W/O & W CONTRAST  10/10/2019 6:42 PM      HISTORY: Evaluate for osteomyelitis of 10th  rib    COMPARISON: CT chest 9/12/2019    TECHNIQUE: Coronal, axial, sagittal T2, coronal, axial and sagittal T1  sequences of the chest pre and postcontrast are obtained. Additionally  in phase, out of phase and diffusion-weighted axial images were  obtained.    FINDINGS:   Chest: Lungs demonstrate medial left lower lobe rim-enhancing fluid  collection consistent with known empyema. Otherwise lungs are without  focal abnormality. The cardiac structures are within normal limits.  Susceptibility artifact around the sternum secondary to sternotomy  wires.    Abdomen: No focal hepatic lesion. Cholelithiasis. The spleen,  incompletely visualized left kidney, pancreas and stomach are within  normal limits.    Regarding the diffusion-weighted sequences there is restricted  diffusion in the left medial lower lobe empyema. Otherwise no  abnormality seen on diffusion weighted and in and out of phase  sequences no abnormality is seen.    Bones: No diffusion abnormalities or enhancement in the ribs or  included proximal glenohumeral joint osseous structures bilaterally.        Impression    IMPRESSION:   1. Mild marrow edema, decreased T1 signal, enhancement, and mild  diffusion restriction within the posterior left 10th rib just lateral  to the costovertebral junction, concerning for focal osteomyelitis.  2. Grossly stable thick-walled fluid collection in the medial lower  left pleural space compatible with empyema. Trace right pleural  effusion and small left pleural effusion.  3. Scattered T1 isointense mildly enhancing opacities in the posterior  left lower lobe may represent mild infection.  4. Stable gallbladder wall thickening versus pericholecystic fluid  with cholelithiasis, partially imaged.    I have personally reviewed the examination and initial interpretation  and I agree with the findings.    NEAL SANTACRUZ MD     Medications     no pre procedure antibiotic needed         amLODIPine  10 mg Oral Daily     calcium  carbonate 600 mg-vitamin D 400 units  1 tablet Oral Daily     carvedilol  12.5 mg Oral BID w/meals     ferrous sulfate  325 mg Oral Daily with breakfast     isosorbide mononitrate  120 mg Oral Daily     magnesium oxide  800 mg Oral Daily     mirtazapine  15 mg Orally disintegrating tablet At Bedtime     multivitamin w/minerals  1 tablet Oral Daily     pantoprazole  40 mg Oral BID     predniSONE  2.5 mg Oral At Bedtime     predniSONE  5 mg Oral QAM     senna-docusate  1 tablet Oral BID    Or     senna-docusate  2 tablet Oral BID     sodium chloride (PF)  3 mL Intracatheter Q8H     sulfamethoxazole-trimethoprim  1 tablet Oral Daily     tacrolimus  2 mg Oral QPM     [START ON 10/12/2019] tacrolimus  2.5 mg Oral QAM     voriconazole  200 mg Oral Q12H JULES

## 2019-10-12 VITALS
OXYGEN SATURATION: 93 % | WEIGHT: 128 LBS | TEMPERATURE: 97.7 F | SYSTOLIC BLOOD PRESSURE: 135 MMHG | HEIGHT: 64 IN | BODY MASS INDEX: 21.85 KG/M2 | DIASTOLIC BLOOD PRESSURE: 86 MMHG | HEART RATE: 84 BPM | RESPIRATION RATE: 16 BRPM

## 2019-10-12 LAB
ANION GAP SERPL CALCULATED.3IONS-SCNC: 7 MMOL/L (ref 3–14)
BUN SERPL-MCNC: 26 MG/DL (ref 7–30)
CALCIUM SERPL-MCNC: 8.6 MG/DL (ref 8.5–10.1)
CHLORIDE SERPL-SCNC: 108 MMOL/L (ref 94–109)
CO2 SERPL-SCNC: 22 MMOL/L (ref 20–32)
CREAT SERPL-MCNC: 1.68 MG/DL (ref 0.52–1.04)
GFR SERPL CREATININE-BSD FRML MDRD: 33 ML/MIN/{1.73_M2}
GLUCOSE SERPL-MCNC: 160 MG/DL (ref 70–99)
POTASSIUM SERPL-SCNC: 4.6 MMOL/L (ref 3.4–5.3)
SODIUM SERPL-SCNC: 137 MMOL/L (ref 133–144)

## 2019-10-12 PROCEDURE — 99239 HOSP IP/OBS DSCHRG MGMT >30: CPT | Performed by: INTERNAL MEDICINE

## 2019-10-12 PROCEDURE — 25000132 ZZH RX MED GY IP 250 OP 250 PS 637: Performed by: INTERNAL MEDICINE

## 2019-10-12 PROCEDURE — 25000132 ZZH RX MED GY IP 250 OP 250 PS 637: Performed by: PHYSICIAN ASSISTANT

## 2019-10-12 PROCEDURE — 36415 COLL VENOUS BLD VENIPUNCTURE: CPT | Performed by: INTERNAL MEDICINE

## 2019-10-12 PROCEDURE — 80048 BASIC METABOLIC PNL TOTAL CA: CPT | Performed by: INTERNAL MEDICINE

## 2019-10-12 PROCEDURE — 25000131 ZZH RX MED GY IP 250 OP 636 PS 637: Performed by: NURSE PRACTITIONER

## 2019-10-12 PROCEDURE — 25000131 ZZH RX MED GY IP 250 OP 636 PS 637: Performed by: PHYSICIAN ASSISTANT

## 2019-10-12 RX ORDER — MIRTAZAPINE 15 MG/1
15 TABLET, ORALLY DISINTEGRATING ORAL AT BEDTIME
Qty: 30 TABLET | Refills: 0 | Status: SHIPPED | OUTPATIENT
Start: 2019-10-12 | End: 2019-11-11

## 2019-10-12 RX ORDER — VORICONAZOLE 200 MG/1
200 TABLET, FILM COATED ORAL EVERY 12 HOURS
Qty: 56 TABLET | Refills: 0 | Status: ON HOLD | OUTPATIENT
Start: 2019-10-12 | End: 2019-10-29

## 2019-10-12 RX ORDER — CARVEDILOL 25 MG/1
25 TABLET ORAL 2 TIMES DAILY WITH MEALS
Qty: 60 TABLET | Refills: 0 | Status: SHIPPED | OUTPATIENT
Start: 2019-10-12 | End: 2019-11-11

## 2019-10-12 RX ORDER — CARVEDILOL 25 MG/1
25 TABLET ORAL 2 TIMES DAILY WITH MEALS
Status: DISCONTINUED | OUTPATIENT
Start: 2019-10-12 | End: 2019-10-12 | Stop reason: HOSPADM

## 2019-10-12 RX ORDER — ISOSORBIDE MONONITRATE 120 MG/1
120 TABLET, EXTENDED RELEASE ORAL DAILY
Qty: 30 TABLET | Refills: 0 | Status: ON HOLD | OUTPATIENT
Start: 2019-10-13 | End: 2019-10-29

## 2019-10-12 RX ADMIN — MULTIPLE VITAMINS W/ MINERALS TAB 1 TABLET: TAB at 07:57

## 2019-10-12 RX ADMIN — ISOSORBIDE MONONITRATE 120 MG: 60 TABLET, EXTENDED RELEASE ORAL at 07:55

## 2019-10-12 RX ADMIN — Medication 1 TABLET: at 07:54

## 2019-10-12 RX ADMIN — MAGNESIUM OXIDE TAB 400 MG (241.3 MG ELEMENTAL MG) 800 MG: 400 (241.3 MG) TAB at 10:54

## 2019-10-12 RX ADMIN — PREDNISONE 5 MG: 5 TABLET ORAL at 07:55

## 2019-10-12 RX ADMIN — SULFAMETHOXAZOLE AND TRIMETHOPRIM 1 TABLET: 400; 80 TABLET ORAL at 07:56

## 2019-10-12 RX ADMIN — CARVEDILOL 12.5 MG: 12.5 TABLET, FILM COATED ORAL at 07:56

## 2019-10-12 RX ADMIN — POLYETHYLENE GLYCOL 3350 17 G: 17 POWDER, FOR SOLUTION ORAL at 08:06

## 2019-10-12 RX ADMIN — SENNOSIDES AND DOCUSATE SODIUM 2 TABLET: 8.6; 5 TABLET ORAL at 07:56

## 2019-10-12 RX ADMIN — AMLODIPINE BESYLATE 10 MG: 10 TABLET ORAL at 07:51

## 2019-10-12 RX ADMIN — VORICONAZOLE 200 MG: 200 TABLET, FILM COATED ORAL at 07:57

## 2019-10-12 RX ADMIN — FERROUS SULFATE TAB 325 MG (65 MG ELEMENTAL FE) 325 MG: 325 (65 FE) TAB at 07:56

## 2019-10-12 RX ADMIN — TACROLIMUS 2.5 MG: 1 CAPSULE ORAL at 07:55

## 2019-10-12 RX ADMIN — PANTOPRAZOLE SODIUM 40 MG: 40 TABLET, DELAYED RELEASE ORAL at 07:56

## 2019-10-12 ASSESSMENT — ACTIVITIES OF DAILY LIVING (ADL)
ADLS_ACUITY_SCORE: 11
ADLS_ACUITY_SCORE: 12
ADLS_ACUITY_SCORE: 11
ADLS_ACUITY_SCORE: 12
ADLS_ACUITY_SCORE: 12

## 2019-10-12 ASSESSMENT — MIFFLIN-ST. JEOR: SCORE: 1155.6

## 2019-10-12 NOTE — PLAN OF CARE
Afebrile,vitals are stable,oxygen sat  96% on room air.Denied sob, bilateral lung sound diminished with fine crackles.Up independently in room,had a bowel movement this evening.Denied pain or discomfort.Continue per plan of care.

## 2019-10-12 NOTE — PLAN OF CARE
DISCHARGE:  Patient with orders to discharge to home    Education Provided:   Med Card updated  Lab Book n/a  Handouts discharge instruction  Specialty Pharmacy n/a  LDAs PIV removed    Discharge instructions, medications & follow ups reviewed with pt. Copy of discharge summary given to pt. PIV removed. Patient in stable condition. AVSS. pt had no further questions regarding discharge instructions and medications. Patient transferred out by her

## 2019-10-12 NOTE — DISCHARGE SUMMARY
[facility], [place]  Hospitalist Discharge Summary       Date of Admission:  10/6/2019  Date of Discharge:  10/12/2019  Discharging Provider: Clarence Rabago MD  Discharge Team: Hospitalist Service, Gold 10    Discharge Diagnoses   Fungal Empyema     Follow-ups Needed After Discharge   Follow-up Appointments     Adult [facility] Follow-up and recommended labs and tests      Follow up with primary care provider, Rosy Vu, within 7   days to evaluate medication change.  The following labs/tests are   recommended: EKG/LFTs/Vori on 10/18 and 10/25.  Repeat CT Chest in 3 weeks    Pulmonology follow up in 3-4 weeks. ID consult in 3-5 weeks      Appointments on [place] and/or [facility] (with [facility] or [facility]   provider or service). Call [phone] if you haven't heard regarding   these appointments within 7 days of discharge.             Unresulted Labs Ordered in the Past 30 Days of this Admission     Date and Time Order Name Status Description    10/11/2019 1134 Actinomyces rule out Preliminary     10/11/2019 0938 Aspergillus Galactomannan Agn Bronchial In process     10/11/2019 0938 Bronchial Culture Aerobic Bacterial Preliminary     10/11/2019 0938 Nocardia culture Preliminary     10/11/2019 0938 Fungus Culture, non-blood Preliminary     10/11/2019 0938 AFB Stain Non Blood In process     10/11/2019 0938 AFB Culture Non Blood In process     10/8/2019 1450 Fluid Culture Aerobic Bacterial Preliminary     10/8/2019 1450 Fungus Culture, non-blood Preliminary     10/8/2019 1450 AFB Culture Non Blood Preliminary     9/14/2019 1308 Blood Culture AFB Preliminary       These results will be followed up by Pulmonology and Infectious Disease    Discharge Disposition   Discharged to home  Condition at discharge: Stable    Hospital Course      This is a 58-year-old female who was admitted on 10/6/2019 secondary to intractable pain at previous site of the known pleural effusion.  The  patient was previously seen and examined on previous admission at which point they felt that surgical intervention was not indicated for pleural effusion.  Patient presented secondary to intractable pain and IR guided drainage of approximately 3 to 4 mL's fluid was obtained showing evidence of aspergillus.  Infectious diseases consulted for further evaluation and recommend placing the patient on voriconazole.  LFT and EKG were is obtained.  Additional concerns of how infection was propagating was raised by infectious disease.  MRI of the chest was obtained which showed no evidence of osteomyelitis.  Thoracic surgery was consulted and felt that no intervention was needed at this time and to repeat a CT prior to next follow-up visit to make sure there is no worsening of empyema.  During her hospital stay she also underwent a bronchoscopy for routine evaluation of transplantation.  Blood pressure throughout the hospital course was highly labile.  Patient was initially transitioned to Norvasc with Imdur.  Patient continue having viable blood dose and labetalol IV pushes appeared to be tolerated well by the patient.  Patient was transitioned to Coreg from Lopressor with improvement in BP.            Consultations This Hospital Stay   PHYSICAL THERAPY ADULT IP CONSULT  OCCUPATIONAL THERAPY ADULT IP CONSULT  PULMONARY GENERAL ADULT IP CONSULT  PULMONARY CF/TRANSPLANT ADULT IP CONSULT  INTERVENTIONAL RADIOLOGY ADULT/PEDS IP CONSULT  INFECTIOUS DISEASE TRANSPLANT SOT ADULT IP CONSULT  VASCULAR ACCESS CARE ADULT IP CONSULT    Code Status   Full Code    Time Spent on this Encounter   I, Clarence Rabago MD, personally saw the patient today and spent greater than 30 minutes discharging this patient.       Clarence Rabago MD  [facility], [place]  ______________________________________________________________________    Physical Exam   Vital Signs: Temp: 97.7  F (36.5  C) Temp src: Oral BP: 135/86 Pulse:  84 Heart Rate: 86 Resp: 16 SpO2: 93 % O2 Device: None (Room air)    Weight: 128 lbs 0 oz  Physical Exam  Constitutional:       Appearance: Normal appearance.   HENT:      Head: Normocephalic and atraumatic.      Mouth/Throat:      Mouth: Mucous membranes are moist.   Eyes:      Pupils: Pupils are equal, round, and reactive to light.   Cardiovascular:      Rate and Rhythm: Normal rate and regular rhythm.      Heart sounds: No murmur. No friction rub. No gallop.    Pulmonary:      Effort: Pulmonary effort is normal. No respiratory distress.      Breath sounds: No wheezing or rales.   Abdominal:      General: Abdomen is flat. There is no distension.      Palpations: Abdomen is soft.      Tenderness: There is no tenderness. There is no guarding.   Musculoskeletal: Normal range of motion.         General: No swelling.      Right lower leg: No edema.      Left lower leg: No edema.   Neurological:      General: No focal deficit present.      Mental Status: She is alert and oriented to person, place, and time.      Cranial Nerves: No cranial nerve deficit.      Sensory: No sensory deficit.      Motor: No weakness.      Gait: Gait normal.              Primary Care Physician   Rosy Vu    Discharge Orders      CT Chest w/o contrast     Hepatic panel     Hepatic panel     Voriconazole Level    To be performed on 10/18 and 10/25     EKG 12-lead complete with read (future)     EKG 12-lead complete with read (future)     Reason for your hospital stay    Fungal Empyema     Adult [facility] Follow-up and recommended labs and tests    Follow up with primary care provider, Rosy Vu, within 7 days to evaluate medication change.  The following labs/tests are recommended: EKG/LFTs/Vori on 10/18 and 10/25.  Repeat CT Chest in 3 weeks    Pulmonology follow up in 3-4 weeks. ID consult in 3-5 weeks      Appointments on [place] and/or [facility] (with [facility] or [facility] provider or service). Call [phone] if you  haven't heard regarding these appointments within 7 days of discharge.     Activity    Your activity upon discharge: activity as tolerated     Full Code     Diet    Follow this diet upon discharge: Orders Placed This Encounter      Regular Diet Adult       Significant Results and Procedures   Results for orders placed or performed during the hospital encounter of 10/06/19   XR Chest 2 Views    Narrative    Exam:  XR CHEST 2 VW, 10/6/2019 4:58 PM    History: chest pain    Comparison:  CT chest 9/12/2019, chest radiograph 9/12/2019    Findings:  PA and lateral views of the chest. Sternotomy wires and  mediastinal surgical clips. Cardiac silhouette is stable. Small right  pleural effusion. No pneumothorax. Hazy retrocardiac opacities.  Visualized upper abdomen is unremarkable.      Impression    Impression:    Small right pleural effusion with hazy retrocardiac opacities favored  to represent atelectasis, less likely infection.     I have personally reviewed the examination and initial interpretation  and I agree with the findings.    MEHNAZ CHAPMAN MD   US Lower Extremity Venous Duplex Bilateral    Narrative    EXAMINATION: Bilateral lower extremity venous Doppler ultrasound.    COMPARISON: Left lower extremity venous ultrasound on 4/9/2018    HISTORY: Please eval for DVT    FINDINGS:   The right common femoral, femoral, popliteal, and posterior tibial  veins are fully compressible with patent Doppler wave forms. No  thrombus is identified within them on grayscale imaging.    The left common femoral, femoral, popliteal, and posterior tibial  veins are fully compressible with patent Doppler wave forms. No  thrombus is identified within them on grayscale imaging.      Impression    IMPRESSION:    No deep venous thrombus demonstrated in either lower extremity.    I have personally reviewed the examination and initial interpretation  and I agree with the findings.    YAMINI GUERIN MD   CT Empyema Abscess Drainage     Narrative    Procedures 10/8/2019:  1. CT-guided left pleural fluid aspiration    History: Empyema versus phlegmon in the medial left costophrenic  angle, abx therapy with without resolution. Aspiration requested.    Comparison: CT 10/1/2019    Staff: Kristine Orellana MD    Fellow/Resident: Case Amaya M.D.    Monitoring: Local anesthesia only. No intravenous conscious sedation  administered by the IR nursing staff. Patient remained stable  throughout the procedure.     Medications: 5 cc 1% lidocaine    DLP: 320 mGy*cm    Findings/procedure: The patient understood the limitations,  alternatives, and risks of the procedure and requested the procedure  be performed. Both written and oral consent were obtained.    Limited pre-procedural scan performed demonstrated adequate and safe  percutaneous window for left posterior costophrenic thick rimmed  collection aspiration.    The left posterior chest walls was prepped and draped in the usual  sterile fashion. 1% lidocaine without epinephrine was used for local  anesthesia.     Under CT guidance, a 19 gauge coaxial introducer needle was advanced  into the posterior costophrenic thick rimmed collection, projected  into the central hypodense portion of the collection. CT image  documenting needle position was saved in the patient's record.  Approximately 4 cc of purulent fluid aspirated. Fluid submitted for  requested laboratory analysis.     Needles removed. Sterile airtight dressing applied.    Limited post-procedural scan demonstrated small amount gas within the  sampled central portion of the collection. No immediate complication.     Estimate blood loss: None    Specimens: None.      Impression    Impression:  CT fluoroscopic left posterior costophrenic fluid collection  aspiration. 4 cc purulent fluid sent for requested laboratory  analysis.    Plan: Return to floor. One hour of bed rest supine.   MR Chest w/o & w Contrast    Narrative    MR CHEST W/O & W CONTRAST   10/10/2019 6:42 PM      HISTORY: Evaluate for osteomyelitis of 10th rib    COMPARISON: CT chest 9/12/2019    TECHNIQUE: Coronal, axial, sagittal T2, coronal, axial and sagittal T1  sequences of the chest pre and postcontrast are obtained. Additionally  in phase, out of phase and diffusion-weighted axial images were  obtained.    FINDINGS:   Chest: Lungs demonstrate medial left lower lobe rim-enhancing fluid  collection consistent with known empyema. Otherwise lungs are without  focal abnormality. The cardiac structures are within normal limits.  Susceptibility artifact around the sternum secondary to sternotomy  wires.    Abdomen: No focal hepatic lesion. Cholelithiasis. The spleen,  incompletely visualized left kidney, pancreas and stomach are within  normal limits.    Regarding the diffusion-weighted sequences there is restricted  diffusion in the left medial lower lobe empyema. Otherwise no  abnormality seen on diffusion weighted and in and out of phase  sequences no abnormality is seen.    Bones: No diffusion abnormalities or enhancement in the ribs or  included proximal glenohumeral joint osseous structures bilaterally.        Impression    IMPRESSION:   1. Mild marrow edema, decreased T1 signal, enhancement, and mild  diffusion restriction within the posterior left 10th rib just lateral  to the costovertebral junction, concerning for focal osteomyelitis.  2. Grossly stable thick-walled fluid collection in the medial lower  left pleural space compatible with empyema. Trace right pleural  effusion and small left pleural effusion.  3. Scattered T1 isointense mildly enhancing opacities in the posterior  left lower lobe may represent mild infection.  4. Stable gallbladder wall thickening versus pericholecystic fluid  with cholelithiasis, partially imaged.    I have personally reviewed the examination and initial interpretation  and I agree with the findings.    NEAL SANTACRUZ MD       Discharge Medications    Current Discharge Medication List      START taking these medications    Details   carvedilol (COREG) 25 MG tablet Take 1 tablet (25 mg) by mouth 2 times daily (with meals)  Qty: 60 tablet, Refills: 0    Associated Diagnoses: Essential hypertension      isosorbide mononitrate (IMDUR) 120 MG 24 HR ER tablet Take 1 tablet (120 mg) by mouth daily  Qty: 30 tablet, Refills: 0    Associated Diagnoses: Essential hypertension      mirtazapine (REMERON SOL-TAB) 15 MG ODT 1 tablet (15 mg) by Orally disintegrating tablet route At Bedtime  Qty: 30 tablet, Refills: 0    Associated Diagnoses: Severe protein-calorie malnutrition (H)      voriconazole (VFEND) 200 MG tablet Take 1 tablet (200 mg) by mouth every 12 hours for 28 days  Qty: 56 tablet, Refills: 0    Associated Diagnoses: Empyema of lung (H)         CONTINUE these medications which have NOT CHANGED    Details   acetaminophen (TYLENOL) 500 MG tablet Take 1,000 mg by mouth every 6 hours as needed for pain       amLODIPine (NORVASC) 10 MG tablet Take 1 tablet (10 mg) by mouth daily  Qty: 30 tablet, Refills: 11    Associated Diagnoses: Accelerated essential hypertension      calcium-vitamin D (CALTRATE) 600-400 MG-UNIT per tablet Take 1 tablet by mouth daily    Associated Diagnoses: S/P lung transplant (H); ILD (interstitial lung disease) (H); Lung transplant recipient (H); Encounter for long-term (current) use of high-risk medication; Anemia, unspecified type; Cytomegalovirus (CMV) viremia (H)      dronabinol (MARINOL) 5 MG capsule Take 5 mg by mouth 2 times daily (before meals) as needed for nausea      ferrous sulfate (FEROSUL) 325 (65 Fe) MG tablet Take 1 tablet (325 mg) by mouth daily (with breakfast)  Qty: 60 tablet, Refills: 2    Associated Diagnoses: S/P lung transplant (H)      magnesium oxide (MAG-OX) 400 MG tablet Take 400 mg by mouth daily       metoprolol tartrate (LOPRESSOR) 25 MG tablet Take 2 tablets (50 mg) by mouth 2 times daily  Qty: 60 tablet, Refills:  11    Comments: Please remind patient to call pharmacy for refills. Thanks!  Associated Diagnoses: Paroxysmal atrial fibrillation (H)      multivitamin, therapeutic with minerals (THERA-VIT-M) TABS tablet Take 1 tablet by mouth daily  Qty: 30 each, Refills: 11    Associated Diagnoses: Lung transplant recipient (H)      ondansetron (ZOFRAN) 4 MG tablet Take 1 tablet (4 mg) by mouth every 12 hours as needed for nausea  Qty: 60 tablet, Refills: 0    Associated Diagnoses: Intractable vomiting with nausea, unspecified vomiting type      pantoprazole (PROTONIX) 40 MG EC tablet Take 1 tablet (40 mg) by mouth 2 times daily  Qty: 60 tablet, Refills: 11    Associated Diagnoses: Gastroesophageal reflux disease without esophagitis; ILD (interstitial lung disease) (H); Lung transplant recipient (H)      predniSONE (DELTASONE) 2.5 MG tablet Take two tablets (5mg) every AM and one tablet (2.5mg) every evening  Qty: 90 tablet, Refills: 11    Associated Diagnoses: Lung replaced by transplant (H)      senna-docusate (SENOKOT-S/PERICOLACE) 8.6-50 MG tablet Take 2 tablets by mouth 2 times daily as needed for constipation  Qty: 60 tablet, Refills: 0    Associated Diagnoses: Constipation, unspecified constipation type      sulfamethoxazole-trimethoprim (BACTRIM/SEPTRA) 400-80 MG tablet Take 1 tablet by mouth daily  Qty: 90 tablet, Refills: 3    Associated Diagnoses: Lung replaced by transplant (H); Need for pneumocystis prophylaxis      tacrolimus (GENERIC EQUIVALENT) 0.5 MG capsule Take 1 capsule (0.5 mg) by mouth every morning Total dose: 2.5 mg in the AM and 2 mg in the PM  Qty: 90 capsule, Refills: 3    Comments: Transplant Date: 3/1/2018 (Lung) Discharge Date: 3/24/2018 ICD10: Lung replaced by transplant - Z94.2 Location of Transplant: [facility] ([place], [place])  Associated Diagnoses: S/P lung transplant (H)      tacrolimus (GENERIC EQUIVALENT) 1 MG capsule Take 2 capsules (2 mg) by mouth 2  times daily Total dose: 2.5 mg in the AM and 2 mg in the PM  Qty: 360 capsule, Refills: 3    Comments: Transplant Date: 3/1/2018 (Lung) Discharge Date: 3/24/2018 ICD10: Lung replaced by transplant - Z94.2 Location of Transplant: [facility] ([place], [place])  Associated Diagnoses: S/P lung transplant (H)      !! order for DME Equipment being ordered: Nebulizer  Qty: 1 Device, Refills: 1    Associated Diagnoses: Status post lung transplantation (H)      !! order for DME Equipment being ordered: Nebulizer  Qty: 1 Device, Refills: 1    Associated Diagnoses: Lung transplant recipient (H)       !! - Potential duplicate medications found. Please discuss with provider.        Allergies   No Known Allergies

## 2019-10-12 NOTE — PROGRESS NOTES
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  I reviewed the CT and MRI and spoke with Dr. Christensen. Surgery will carry a significant risk of morbidity and this is a small left pleural fluid collection. I favor treatment with antifungal agent and then reassess with another CT scan in a few weeks. I discussed this with the patient and her  and they are in agreement.    Toby Hernandez MD PhD

## 2019-10-12 NOTE — PLAN OF CARE
"/77 (BP Location: Left arm, Cuff Size: Adult Regular)   Pulse 84   Temp 97.7  F (36.5  C) (Oral)   Resp 16   Ht 1.626 m (5' 4\")   Wt 56.6 kg (124 lb 11.2 oz)   LMP 06/07/2014 (Exact Date)   SpO2 94%   BMI 21.40 kg/m    [id number]. Pt admitted for left abdomen pain. PMH of bilateral lung transplant in 2018. A&Ox4, neuro intact. Independent for transfer. Pt slept all night. Denied pain, sob  or chest pain, coughing, numbness/tingling. On room air and lung sound clear/diminished to bases. Continent of bowel and bladder. Vs stable on RA. Will continue on the current POC and will contact team for any changes.   "

## 2019-10-12 NOTE — PROGRESS NOTES
Pulmonary Medicine  Cystic Fibrosis - Lung Transplant Team  Progress Note  2019       Patient: Kecia Blue  MRN: [id number]  : 1960 (age 58 year old)  Transplant: 3/1/2018 (Lung), POD#590  Admission date: 10/6/2019    Assessment & Plan:     Kecia Blue is a 58 year old female with a PMH of ILD s/p BSLT (3/2018), anti-synthetase syndrome, dermatomyositis, and pulmonary HTN with recent admission -9/15/19 for LUQ abdominal pain r/t empyema s/p unsuccessful thoracentesis.  She was admitted on 10/6 from the ED for evaluation of recurrence of LUQ abdominal pain.  No acute pulmonary symptoms.    Today's recommendations:  - Follow Bronch/BAL cultures done on 10/11/19  - Will need labs on 10/17/19 with EKG/Vori level and Tac level along with LFT's.  - Non-con Chest CT in 3 - 4 weeks with clinic visit.  - Will need ID follow up.    S/P bilateral sequential lung transplant (BSLT) for ILD:  Last seen in pulm clinic . Most recent PFTs with FEV1 61% (), overall improving. Last DSA () negative. Recent Steno maltophilia () on sputum culture, s/p 3 week course of PO ABX (Levaquin->Bactrim). Denies dyspnea, continues to have chronic morning cough, productive for clear, thin sputum and occasionally accompanied by nausea d/t gagging.  No hypoxia, fever, or leukocytosis. Admission CXR (personally reviewed) with small right pleural effusion and retrocardiac opacities c/w atelectasis.    Immunosuppression:  - Tacrolimus 2.5 mg BID.  Goal level 8-10 (d/t CKD). It was 5.3 (10/10/2019) as we started Vori did not adjust dose. Will recheck level on 10/17/19.  - MMF on HOLD since 18 d/t CMV viremia and leukopenia. Per Dr. Mcelroy, plan is to restart when CMV consistently negative. With recurrent cultures and now Empyema suspect it is better off not to restart it.  - Prednisone 5 mg qAM / 2.5 mg qPM    Prophylaxis:   - Bactrim SS mg PO daily  - Pantoprazole EC 40 mg PO BID     Acute on  Chronic LUQ abdominal pain:  LLL empyema: Presented with ~3 month hx of intermittent LUQ abdominal pain, increased one week PTA. No fever, or chills. Recent admission (9/11-9/15) for similar complaint, with work up including MRCP (negative) and chest CT revealing LLL empeyma.  Thoracentesis (9/13) was unsuccessful. VATS was discussed, but at that time risk was felt to outweigh benefit. Positive galactomannan and fungitell (9/17) noted, antifungal deferred given stability. CT Chest (10/1) from OSH with no change in size of empeyma. Etiology likely bacterial vs atypical bacterial vs fungal. ESR and CRP decreased from 9/13 but remain slightly elevated. Pain better controlled with current regimen, intermittent nausea persists.  - Galactomannan and fungitell (10/7) positive.  - CT guided pleural tap on 10/8 - obtained 4cc of fluid. It was positive for Aspergillus. Lab called and ordered sensitivities  - ID consulted, started (10/10/19) Vori 350mg BID x 2doses and then 200mg BID. Pt was advised about risk of hallucinations, sun sensitivity and skin cancer.  - BAL from left LL postr segment to assess for possible co-existent actinomyces on cultures obtained on 10/11/19. Cytology is positive for branching septate hyphae.  - Seen by  - no surgery at this time. We will reassess with repeat CT in 3 - 4weeks.  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     Right main bronchial stenosis s/p dilatation: Follow with Dr. Lin for serial bronchoscopies with dilation. Last bronch 10/11 with dilation of BI.  - Next bronch scheduled in 8 weeks with Dr. Lin.    H/o CMV Viremia: CMV has been followed Q2 week as OP. CMV PCR has been <100 since 2/21. Valcyte was discontinued 7/25. CMV transiently elevated to 662 (8/20) and now again decreased. Most recent level 10/6 not detected.     Other relevent problems managed by primary team:     CKD stage IV: Cr decreased to 1.4 from 1.7  "on admission. Recent baseline 1.3 - 1.5.   - Will continue to monitor tacrolimus levels  - Avoid nephrotoxins and renally dose medications.    We appreciate the excellent care provided by the [place] [address] Medicine team. Recommendations communicated in person rounding and this note. Will continue to follow along closely, please do not hesitate to call with any questions or concerns.     Subjective & Interval History:     No acute events overnight. The cough seems to be better but still present. Appetite has improved. No more pain.  No SOB. No wheezing. No leg swelling.    Review of Systems:     C: no fever, no chills, no change in weight, no change in appetite  INTEGUMENTARY/SKIN: no rash or obvious new lesions  ENT/MOUTH: no sore throat, no sinus pain, no nasal congestion or drainage  RESP: see interval history  CV: no chest pain, no palpitations, no peripheral edema, no orthopnea  GI: see interval history  : no dysuria  MUSCULOSKELETAL: no myalgias, no arthralgias  ENDOCRINE: blood sugars with adequate control  NEURO: no headache, no numbness or tingling  PSYCHIATRIC: mood stable    Physical Exam:     Vital signs:  Temp: 97.7  F (36.5  C) Temp src: Oral BP: 135/86 Pulse: 84 Heart Rate: 86 Resp: 16 SpO2: 93 % O2 Device: None (Room air) Oxygen Delivery: 1 LPM Height: 162.6 cm (5' 4\") Weight: 58.1 kg (128 lb)  I/O:     Intake/Output Summary (Last 24 hours) at 10/12/2019 1715  Last data filed at 10/12/2019 1000  Gross per 24 hour   Intake 480 ml   Output 300 ml   Net 180 ml       Constitutional: Sitting up in bed, in no apparent distress.   HEENT: Normocephalic and atraumatic. Eyes with pink conjunctivae, anicteric. Sclera white. Oral mucosa moist without lesions. Neck supple without lymphadenopathy.   PULM: Good air flow right, Diminished in LLL. Coarse crackles LLL and left mid lung field, no rhonchi, no wheezes. Non-labored breathing on room air.  CV: Normal S1 and S2. RRR. No murmur, gallop, or rub. No peripheral " edema.   ABD: Hypoactive bowel sounds, soft, nontender, nondistended.    MSK: Moves all extremities. No apparent muscle wasting.   NEURO: Alert and oriented, conversant.   SKIN: Warm, dry. No rash on limited exam.   PSYCH: Mood stable.    Lines, Drains, and Devices:  Peripheral IV 10/06/19 Left (Active)   Site Assessment WDL 10/8/2019  3:00 AM   Line Status Saline locked 10/8/2019  3:00 AM   Phlebitis Scale 0-->no symptoms 10/8/2019  3:00 AM   Infiltration Scale 0 10/8/2019  3:00 AM   Infiltration Site Treatment Method  None 10/8/2019  3:00 AM   Extravasation? No 10/8/2019  3:00 AM   Number of days: 2     Data:       LABS    CMP:   Recent Labs   Lab 10/12/19  0642 10/11/19  0535 10/10/19  0616 10/09/19  1040  10/06/19  1444    138 135 135   < > 134   POTASSIUM 4.6 4.5 5.0 4.5   < > 4.4   CHLORIDE 108 109 106 108   < > 102   CO2 22 23 20 22   < > 21   ANIONGAP 7 6 9 5   < > 11   * 87 92 105*   < > 95   BUN 26 18 16 18   < > 21   CR 1.68* 1.30* 1.39* 1.24*   < > 1.71*   GFRESTIMATED 33* 46* 42* 48*   < > 33*   GFRESTBLACK 39* 53* 49* 56*   < > 38*   CHELSEY 8.6 8.6 9.1 8.8   < > 9.3   MAG  --   --   --   --   --  2.0   PHOS  --   --   --   --   --  3.9   PROTTOTAL  --   --  7.5  --   --  8.3   ALBUMIN  --   --  2.6*  --   --  3.0*   BILITOTAL  --   --  0.3  --   --  0.4   ALKPHOS  --   --  205*  --   --  279*   AST  --   --  21  --   --  24   ALT  --   --  18  --   --  22    < > = values in this interval not displayed.     CBC:   Recent Labs   Lab 10/08/19  0726 10/07/19  0717 10/06/19  1444   WBC 9.1 7.5 9.4   RBC 3.27* 3.00* 3.43*   HGB 8.9* 8.2* 9.2*   HCT 30.6* 27.6* 31.6*   MCV 94 92 92   MCH 27.2 27.3 26.8   MCHC 29.1* 29.7* 29.1*   RDW 17.1* 17.2* 16.9*   * 96* 121*       INR:   Recent Labs   Lab 10/06/19  1444   INR 1.06       Glucose:   Recent Labs   Lab 10/12/19  0642 10/11/19  0535 10/10/19  0616 10/09/19  1040 10/08/19  0726 10/07/19  0717   * 87 92 105* 86 69*       Blood Gas: No  lab results found in last 7 days.    Culture Data   Recent Labs   Lab 10/11/19  0924 10/08/19  1719 10/08/19  0753   CULT Culture in progress  Culture negative monitoring continues  PENDING  PENDING Culture received and in progress.  Positive AFB results are called as soon as detected.    Final report to follow in 7 to 8 weeks.    Assayed at [facility] [address] [facility] [place] [address] [phone]  Light growth  Aspergillus fumigatus  *  Critical Value/Significant Value, preliminary result only, called to and read back by  Yenni Titus RN 10.10.19 1021. RAFAEL    Culture in progress  Susceptibility testing requested by  Dr Jhoan Christensen on 10.10.2019 at 1645.  JRT    Aspergillus species  Speciation in progress  * Moderate growth  Normal alo         Virology Data:   Lab Results   Component Value Date    FLUAH1 Negative 10/07/2019    FLUAH3 Negative 10/07/2019    SV0318 Negative 10/07/2019    IFLUB Negative 10/07/2019    RSVA Negative 10/07/2019    RSVB Negative 10/07/2019    PIV1 Negative 10/07/2019    PIV2 Negative 10/07/2019    PIV3 Negative 10/07/2019    HMPV Negative 10/07/2019    HRVS Negative 10/07/2019    ADVBE Negative 10/07/2019    ADVC Negative 10/07/2019    ADVC Negative 03/07/2019    ADVC Negative 01/17/2019       Historical CMV results (last 3 of prior testing):  Lab Results   Component Value Date    CMVQNT CMV DNA Not Detected 10/06/2019    CMVQNT <137 (A) 09/12/2019    CMVQNT <137 (A) 06/05/2019     Lab Results   Component Value Date    CMVLOG Not Calculated 10/06/2019    CMVLOG <2.1 09/12/2019    CMVLOG <2.1 06/05/2019       Urine Studies    Recent Labs   Lab Test 09/12/19  0125 08/07/19  1512   URINEPH 7.5* 7.0   NITRITE Negative Negative   LEUKEST Negative Trace*   WBCU 3 19*       Most Recent [place] Pulmonary Function Testing (FVC/FEV1 only)  FVC-Pre   Date Value Ref Range Status   08/07/2019 2.22 L    06/05/2019 2.26 L    03/05/2019 1.97 L    01/16/2019 1.92 L       FVC-%Pred-Pre   Date Value Ref Range Status   08/07/2019 67 %    06/05/2019 70 %    03/05/2019 60 %    01/16/2019 59 %      FEV1-Pre   Date Value Ref Range Status   08/07/2019 1.60 L    06/05/2019 1.65 L    03/05/2019 1.31 L    01/16/2019 1.04 L      FEV1-%Pred-Pre   Date Value Ref Range Status   08/07/2019 61 %    06/05/2019 64 %    03/05/2019 51 %    01/16/2019 40 %        IMAGING    Recent Results (from the past 48 hour(s))   XR Chest 2 Views    Narrative    Exam:  XR CHEST 2 VW, 10/6/2019 4:58 PM    History: chest pain    Comparison:  CT chest 9/12/2019, chest radiograph 9/12/2019    Findings:  PA and lateral views of the chest. Sternotomy wires and  mediastinal surgical clips. Cardiac silhouette is stable. Small right  pleural effusion. No pneumothorax. Hazy retrocardiac opacities.  Visualized upper abdomen is unremarkable.      Impression    Impression:    Small right pleural effusion with hazy retrocardiac opacities favored  to represent atelectasis, less likely infection.     I have personally reviewed the examination and initial interpretation  and I agree with the findings.    MEHNAZ CHAPMAN MD   US Lower Extremity Venous Duplex Bilateral    Narrative    EXAMINATION: Bilateral lower extremity venous Doppler ultrasound.    COMPARISON: Left lower extremity venous ultrasound on 4/9/2018    HISTORY: Please eval for DVT    FINDINGS:   The right common femoral, femoral, popliteal, and posterior tibial  veins are fully compressible with patent Doppler wave forms. No  thrombus is identified within them on grayscale imaging.    The left common femoral, femoral, popliteal, and posterior tibial  veins are fully compressible with patent Doppler wave forms. No  thrombus is identified within them on grayscale imaging.      Impression    IMPRESSION:    No deep venous thrombus demonstrated in either lower extremity.    I have personally reviewed the examination and initial interpretation  and I agree with the  findings.    YAMINI GUERIN MD

## 2019-10-13 ENCOUNTER — PATIENT OUTREACH (OUTPATIENT)
Dept: CARE COORDINATION | Facility: CLINIC | Age: 59
End: 2019-10-13

## 2019-10-13 LAB
ACID FAST STN SPEC QL: NORMAL
ASPERGILLUS GALACTOMANNAN ANTIGEN BAL: POSITIVE
BACTERIA SPEC CULT: ABNORMAL
GALACTOMANNAN AG SERPL-ACNC: 7.57
Lab: ABNORMAL
SPECIMEN SOURCE: ABNORMAL
SPECIMEN SOURCE: NORMAL

## 2019-10-14 ENCOUNTER — TELEPHONE (OUTPATIENT)
Dept: TRANSPLANT | Facility: CLINIC | Age: 59
End: 2019-10-14

## 2019-10-14 DIAGNOSIS — Z94.2 LUNG REPLACED BY TRANSPLANT (H): Primary | ICD-10-CM

## 2019-10-14 NOTE — TELEPHONE ENCOUNTER
Call from [facility]- they have pt already scheduled to some for labs and EKG for tomorrow 10/15/19  Is that OK or do you wnt her done on the 17th?

## 2019-10-14 NOTE — TELEPHONE ENCOUNTER
Called to follow up on patient post-discharge. Now on voriconazole for aspergillus from pleural fluid. Planning for labs and EKG on 10/18, will confirm with local lab they have orders for these. Needs f/u in 3-4 weeks with non-contrast chest CT, will message schedulers.   Patient reports bloating since yesterday, a little better today. Wonders if it's from any of her new meds started during recent hospitalization (voricoanzole, Remeron, coreg, and Imdur). Will discuss with pharmacist and Ame.

## 2019-10-15 LAB
BACTERIA SPEC CULT: ABNORMAL
BACTERIA SPEC CULT: ABNORMAL
FUNGUS SPEC CULT: ABNORMAL
SPECIMEN SOURCE: ABNORMAL
SPECIMEN SOURCE: ABNORMAL

## 2019-10-16 ENCOUNTER — TELEPHONE (OUTPATIENT)
Dept: TRANSPLANT | Facility: CLINIC | Age: 59
End: 2019-10-16

## 2019-10-16 ENCOUNTER — PRE VISIT (OUTPATIENT)
Dept: INFECTIOUS DISEASES | Facility: CLINIC | Age: 59
End: 2019-10-16

## 2019-10-16 NOTE — TELEPHONE ENCOUNTER
Discussed new meds with pharmacy, biggest possibly contribute to bloating seems to be Remeron. Per Ame, okay to stop if bloating still persists over the next 1-2 days. Patient reports this has been ongoing, comes and goes, over the past two days, so she will stop Remeron and see if this helps. She will call with an update.

## 2019-10-17 ENCOUNTER — TRANSFERRED RECORDS (OUTPATIENT)
Dept: HEALTH INFORMATION MANAGEMENT | Facility: CLINIC | Age: 59
End: 2019-10-17

## 2019-10-17 ENCOUNTER — TELEPHONE (OUTPATIENT)
Dept: TRANSPLANT | Facility: CLINIC | Age: 59
End: 2019-10-17

## 2019-10-17 DIAGNOSIS — Z94.2 LUNG REPLACED BY TRANSPLANT (H): ICD-10-CM

## 2019-10-17 LAB
FUNGUS SPEC CULT: ABNORMAL
SPECIMEN SOURCE: ABNORMAL

## 2019-10-17 PROCEDURE — 80197 ASSAY OF TACROLIMUS: CPT | Performed by: INTERNAL MEDICINE

## 2019-10-17 NOTE — LETTER
PHYSICIAN ORDERS      DATE & TIME ISSUED: 2019 3:13 PM  PATIENT NAME: Kecia Blue   : 1960     [facility] MR# [if applicable]: [id number]     DIAGNOSIS:  Long Term use of medications  Z79.899 and Lung Transplant  Z94.2    Patient needs following infusions:  10/18: 1L normal saline   10/19: 1L normal saline    Please also draw BMP each time.   Please call patient at [phone] to schedule infusions    Any questions please call: Gaye [phone]    Please fax these results to [phone].      .

## 2019-10-17 NOTE — TELEPHONE ENCOUNTER
Discussed critical labs with Dr. Christensen:  K 4.7  Phosphorus 5.5  Creatinine 4.0 (was 1.63 on 10/12)  Alk phos: 211  CO2 17    Per Dr. Christensen, have patient go to local ED to get IV fluids, BMP recheck tomorrow  Tacrolimus level pending.  Patient in agreement with plan.   Report given to [facility] ED in [place]

## 2019-10-17 NOTE — LETTER
PHYSICIAN ORDERS      DATE & TIME ISSUED: 2019 3:16 PM  PATIENT NAME: Kecia Blue   : 1960     [facility] MR# [if applicable]: [id number]     DIAGNOSIS:  Long Term use of medications  Z79.899 and Lung Transplant  Z94.2    Patient needs following lab drawn week of 10/21: Basic Metabolic Panel, Liver Panel, and Voriconazole Level.     Any questions please call: Gaye [phone]     Please fax these results to [phone].      .

## 2019-10-17 NOTE — TELEPHONE ENCOUNTER
Per Dr. Christensen, for 10/17 critical labs,   Patient to get IVF 1L x 3 days, BMP daily. Orders sent to [facility].   Pt to repeat vori level, LFTs, and BMP next week. Orders sent to [facility] lab.

## 2019-10-17 NOTE — LETTER
PHYSICIAN ORDERS      DATE & TIME ISSUED: 2019 3:16 PM  PATIENT NAME: Kecia Blue   : 1960     [facility] MR# [if applicable]: [id number]     DIAGNOSIS:  Long Term use of medications  Z79.899 and Lung Transplant  Z94.2    Patient needs following lab drawn week of 10/21: Basic Metabolic Panel, Liver Panel, and Voriconazole Level.     Any questions please call: Gaye [phone]     Please fax these results to [phone].      .

## 2019-10-17 NOTE — TELEPHONE ENCOUNTER
DATE:  10/17/2019   TIME OF RECEIPT FROM LAB:  12:33 pm  LAB TEST/LAB VALUE:  ,   CO2 17  RESULTS GIVEN WITH READ-BACK TO (PROVIDER):  Tomi Castaneda  TIME LAB VALUE REPORTED TO PROVIDER:   12:40 pm

## 2019-10-18 ENCOUNTER — TELEPHONE (OUTPATIENT)
Dept: TRANSPLANT | Facility: CLINIC | Age: 59
End: 2019-10-18

## 2019-10-18 LAB
TACROLIMUS BLD-MCNC: 27 UG/L (ref 5–15)
TME LAST DOSE: ABNORMAL H

## 2019-10-18 NOTE — TELEPHONE ENCOUNTER
TRIAGE: pt called with FK level 27. Pt states she didn't know it was going to be drawn so took her pills prior. She is having labs drawn tomorrow, will take a kit in and ensure she is close to a 12 hour trough.

## 2019-10-20 LAB
BACTERIA SPEC CULT: ABNORMAL
SPECIMEN SOURCE: ABNORMAL

## 2019-10-21 ENCOUNTER — TELEPHONE (OUTPATIENT)
Dept: TRANSPLANT | Facility: CLINIC | Age: 59
End: 2019-10-21

## 2019-10-21 ENCOUNTER — APPOINTMENT (OUTPATIENT)
Dept: CT IMAGING | Facility: CLINIC | Age: 59
DRG: 682 | End: 2019-10-21
Attending: EMERGENCY MEDICINE
Payer: COMMERCIAL

## 2019-10-21 ENCOUNTER — HOSPITAL ENCOUNTER (INPATIENT)
Facility: CLINIC | Age: 59
LOS: 8 days | Discharge: HOME | DRG: 682 | End: 2019-10-29
Attending: EMERGENCY MEDICINE | Admitting: INTERNAL MEDICINE
Payer: COMMERCIAL

## 2019-10-21 DIAGNOSIS — Z79.2 ADMINISTRATION OF LONG-TERM PROPHYLACTIC ANTIBIOTICS: ICD-10-CM

## 2019-10-21 DIAGNOSIS — N17.9 ACUTE KIDNEY INJURY (H): ICD-10-CM

## 2019-10-21 DIAGNOSIS — Z94.2 S/P LUNG TRANSPLANT (H): ICD-10-CM

## 2019-10-21 DIAGNOSIS — B44.9 ASPERGILLUS (H): Primary | ICD-10-CM

## 2019-10-21 DIAGNOSIS — J86.9 EMPYEMA OF LUNG (H): ICD-10-CM

## 2019-10-21 LAB
ALBUMIN SERPL-MCNC: 3 G/DL (ref 3.4–5)
ALBUMIN UR-MCNC: 30 MG/DL
ALP SERPL-CCNC: 194 U/L (ref 40–150)
ALT SERPL W P-5'-P-CCNC: 16 U/L (ref 0–50)
ANION GAP SERPL CALCULATED.3IONS-SCNC: 12 MMOL/L (ref 3–14)
ANION GAP SERPL CALCULATED.3IONS-SCNC: 13 MMOL/L (ref 3–14)
APPEARANCE UR: ABNORMAL
AST SERPL W P-5'-P-CCNC: 18 U/L (ref 0–45)
BASOPHILS # BLD AUTO: 0 10E9/L (ref 0–0.2)
BASOPHILS NFR BLD AUTO: 0.3 %
BILIRUB SERPL-MCNC: 0.3 MG/DL (ref 0.2–1.3)
BILIRUB UR QL STRIP: NEGATIVE
BUN SERPL-MCNC: 66 MG/DL (ref 7–30)
BUN SERPL-MCNC: 67 MG/DL (ref 7–30)
CALCIUM SERPL-MCNC: 8 MG/DL (ref 8.5–10.1)
CALCIUM SERPL-MCNC: 8.5 MG/DL (ref 8.5–10.1)
CHLORIDE SERPL-SCNC: 107 MMOL/L (ref 94–109)
CHLORIDE SERPL-SCNC: 109 MMOL/L (ref 94–109)
CK SERPL-CCNC: 70 U/L (ref 30–225)
CO2 BLDCOV-SCNC: 13 MMOL/L (ref 21–28)
CO2 SERPL-SCNC: 10 MMOL/L (ref 20–32)
CO2 SERPL-SCNC: 14 MMOL/L (ref 20–32)
COLOR UR AUTO: YELLOW
CREAT SERPL-MCNC: 5.76 MG/DL (ref 0.52–1.04)
CREAT SERPL-MCNC: 5.9 MG/DL (ref 0.52–1.04)
DIFFERENTIAL METHOD BLD: ABNORMAL
EOSINOPHIL # BLD AUTO: 0.1 10E9/L (ref 0–0.7)
EOSINOPHIL NFR BLD AUTO: 1 %
ERYTHROCYTE [DISTWIDTH] IN BLOOD BY AUTOMATED COUNT: 19.4 % (ref 10–15)
GFR SERPL CREATININE-BSD FRML MDRD: 7 ML/MIN/{1.73_M2}
GFR SERPL CREATININE-BSD FRML MDRD: 8 ML/MIN/{1.73_M2}
GLUCOSE SERPL-MCNC: 111 MG/DL (ref 70–99)
GLUCOSE SERPL-MCNC: 138 MG/DL (ref 70–99)
GLUCOSE UR STRIP-MCNC: NEGATIVE MG/DL
HCT VFR BLD AUTO: 31.4 % (ref 35–47)
HGB BLD-MCNC: 9.4 G/DL (ref 11.7–15.7)
HGB UR QL STRIP: ABNORMAL
IMM GRANULOCYTES # BLD: 0 10E9/L (ref 0–0.4)
IMM GRANULOCYTES NFR BLD: 0.5 %
KETONES UR STRIP-MCNC: NEGATIVE MG/DL
LACTATE BLD-SCNC: 0.5 MMOL/L (ref 0.7–2.1)
LEUKOCYTE ESTERASE UR QL STRIP: ABNORMAL
LIPASE SERPL-CCNC: 119 U/L (ref 73–393)
LYMPHOCYTES # BLD AUTO: 0.4 10E9/L (ref 0.8–5.3)
LYMPHOCYTES NFR BLD AUTO: 6.2 %
MAGNESIUM SERPL-MCNC: 2 MG/DL (ref 1.6–2.3)
MCH RBC QN AUTO: 28.1 PG (ref 26.5–33)
MCHC RBC AUTO-ENTMCNC: 29.9 G/DL (ref 31.5–36.5)
MCV RBC AUTO: 94 FL (ref 78–100)
MONOCYTES # BLD AUTO: 0.3 10E9/L (ref 0–1.3)
MONOCYTES NFR BLD AUTO: 5 %
MUCOUS THREADS #/AREA URNS LPF: PRESENT /LPF
NEUTROPHILS # BLD AUTO: 5.5 10E9/L (ref 1.6–8.3)
NEUTROPHILS NFR BLD AUTO: 87 %
NITRATE UR QL: NEGATIVE
NRBC # BLD AUTO: 0 10*3/UL
NRBC BLD AUTO-RTO: 0 /100
PCO2 BLDV: 29 MM HG (ref 40–50)
PH BLDV: 7.25 PH (ref 7.32–7.43)
PH UR STRIP: 5 PH (ref 5–7)
PHOSPHATE SERPL-MCNC: 6.7 MG/DL (ref 2.5–4.5)
PLATELET # BLD AUTO: 134 10E9/L (ref 150–450)
PO2 BLDV: 35 MM HG (ref 25–47)
POTASSIUM SERPL-SCNC: 4.5 MMOL/L (ref 3.4–5.3)
POTASSIUM SERPL-SCNC: 5 MMOL/L (ref 3.4–5.3)
PROT SERPL-MCNC: 7.2 G/DL (ref 6.8–8.8)
RADIOLOGIST FLAGS: NORMAL
RBC # BLD AUTO: 3.34 10E12/L (ref 3.8–5.2)
RBC #/AREA URNS AUTO: 25 /HPF (ref 0–2)
SAO2 % BLDV FROM PO2: 59 %
SODIUM SERPL-SCNC: 132 MMOL/L (ref 133–144)
SODIUM SERPL-SCNC: 133 MMOL/L (ref 133–144)
SOURCE: ABNORMAL
SP GR UR STRIP: 1.02 (ref 1–1.03)
SQUAMOUS #/AREA URNS AUTO: 30 /HPF (ref 0–1)
TRANS CELLS #/AREA URNS HPF: 7 /HPF (ref 0–1)
UROBILINOGEN UR STRIP-MCNC: NORMAL MG/DL (ref 0–2)
WBC # BLD AUTO: 6.3 10E9/L (ref 4–11)
WBC #/AREA URNS AUTO: 115 /HPF (ref 0–5)

## 2019-10-21 PROCEDURE — 96360 HYDRATION IV INFUSION INIT: CPT | Performed by: EMERGENCY MEDICINE

## 2019-10-21 PROCEDURE — 85025 COMPLETE CBC W/AUTO DIFF WBC: CPT | Performed by: EMERGENCY MEDICINE

## 2019-10-21 PROCEDURE — 99207 ZZC APP CREDIT; MD BILLING SHARED VISIT: CPT | Performed by: PHYSICIAN ASSISTANT

## 2019-10-21 PROCEDURE — 82570 ASSAY OF URINE CREATININE: CPT | Performed by: PHYSICIAN ASSISTANT

## 2019-10-21 PROCEDURE — 12000001 ZZH R&B MED SURG/OB UMMC

## 2019-10-21 PROCEDURE — 99223 1ST HOSP IP/OBS HIGH 75: CPT | Mod: AI | Performed by: INTERNAL MEDICINE

## 2019-10-21 PROCEDURE — 25000132 ZZH RX MED GY IP 250 OP 250 PS 637: Performed by: PHYSICIAN ASSISTANT

## 2019-10-21 PROCEDURE — 84300 ASSAY OF URINE SODIUM: CPT | Performed by: EMERGENCY MEDICINE

## 2019-10-21 PROCEDURE — 80197 ASSAY OF TACROLIMUS: CPT | Performed by: PHYSICIAN ASSISTANT

## 2019-10-21 PROCEDURE — 25000131 ZZH RX MED GY IP 250 OP 636 PS 637: Performed by: PHYSICIAN ASSISTANT

## 2019-10-21 PROCEDURE — 25800030 ZZH RX IP 258 OP 636: Performed by: PHYSICIAN ASSISTANT

## 2019-10-21 PROCEDURE — 83735 ASSAY OF MAGNESIUM: CPT | Performed by: PHYSICIAN ASSISTANT

## 2019-10-21 PROCEDURE — 83690 ASSAY OF LIPASE: CPT | Performed by: EMERGENCY MEDICINE

## 2019-10-21 PROCEDURE — 84100 ASSAY OF PHOSPHORUS: CPT | Performed by: PHYSICIAN ASSISTANT

## 2019-10-21 PROCEDURE — 83605 ASSAY OF LACTIC ACID: CPT

## 2019-10-21 PROCEDURE — 82550 ASSAY OF CK (CPK): CPT | Performed by: PHYSICIAN ASSISTANT

## 2019-10-21 PROCEDURE — 80048 BASIC METABOLIC PNL TOTAL CA: CPT | Performed by: PHYSICIAN ASSISTANT

## 2019-10-21 PROCEDURE — 82803 BLOOD GASES ANY COMBINATION: CPT

## 2019-10-21 PROCEDURE — 84300 ASSAY OF URINE SODIUM: CPT | Performed by: PHYSICIAN ASSISTANT

## 2019-10-21 PROCEDURE — 74176 CT ABD & PELVIS W/O CONTRAST: CPT

## 2019-10-21 PROCEDURE — 82570 ASSAY OF URINE CREATININE: CPT | Performed by: EMERGENCY MEDICINE

## 2019-10-21 PROCEDURE — 80053 COMPREHEN METABOLIC PANEL: CPT | Performed by: EMERGENCY MEDICINE

## 2019-10-21 PROCEDURE — 36415 COLL VENOUS BLD VENIPUNCTURE: CPT | Performed by: PHYSICIAN ASSISTANT

## 2019-10-21 PROCEDURE — 99285 EMERGENCY DEPT VISIT HI MDM: CPT | Mod: Z6 | Performed by: EMERGENCY MEDICINE

## 2019-10-21 PROCEDURE — 99285 EMERGENCY DEPT VISIT HI MDM: CPT | Mod: 25 | Performed by: EMERGENCY MEDICINE

## 2019-10-21 PROCEDURE — 25000128 H RX IP 250 OP 636: Performed by: EMERGENCY MEDICINE

## 2019-10-21 PROCEDURE — 82010 KETONE BODYS QUAN: CPT | Performed by: PHYSICIAN ASSISTANT

## 2019-10-21 PROCEDURE — 81001 URINALYSIS AUTO W/SCOPE: CPT | Performed by: EMERGENCY MEDICINE

## 2019-10-21 RX ORDER — PREDNISONE 5 MG/1
5 TABLET ORAL EVERY MORNING
Status: DISCONTINUED | OUTPATIENT
Start: 2019-10-22 | End: 2019-10-29 | Stop reason: HOSPADM

## 2019-10-21 RX ORDER — SODIUM BICARBONATE 650 MG/1
1300 TABLET ORAL 2 TIMES DAILY
Status: DISCONTINUED | OUTPATIENT
Start: 2019-10-21 | End: 2019-10-23

## 2019-10-21 RX ORDER — NALOXONE HYDROCHLORIDE 0.4 MG/ML
.1-.4 INJECTION, SOLUTION INTRAMUSCULAR; INTRAVENOUS; SUBCUTANEOUS
Status: DISCONTINUED | OUTPATIENT
Start: 2019-10-21 | End: 2019-10-29 | Stop reason: HOSPADM

## 2019-10-21 RX ORDER — ONDANSETRON 4 MG/1
4 TABLET, ORALLY DISINTEGRATING ORAL EVERY 6 HOURS PRN
Status: DISCONTINUED | OUTPATIENT
Start: 2019-10-21 | End: 2019-10-29 | Stop reason: HOSPADM

## 2019-10-21 RX ORDER — AMLODIPINE BESYLATE 10 MG/1
10 TABLET ORAL DAILY
Status: DISCONTINUED | OUTPATIENT
Start: 2019-10-22 | End: 2019-10-22

## 2019-10-21 RX ORDER — MULTIPLE VITAMINS W/ MINERALS TAB 9MG-400MCG
1 TAB ORAL DAILY
Status: DISCONTINUED | OUTPATIENT
Start: 2019-10-22 | End: 2019-10-29 | Stop reason: HOSPADM

## 2019-10-21 RX ORDER — PANTOPRAZOLE SODIUM 40 MG/1
40 TABLET, DELAYED RELEASE ORAL 2 TIMES DAILY
Status: DISCONTINUED | OUTPATIENT
Start: 2019-10-21 | End: 2019-10-29 | Stop reason: HOSPADM

## 2019-10-21 RX ORDER — FERROUS SULFATE 325(65) MG
325 TABLET ORAL
Status: DISCONTINUED | OUTPATIENT
Start: 2019-10-22 | End: 2019-10-29 | Stop reason: HOSPADM

## 2019-10-21 RX ORDER — AMOXICILLIN 250 MG
2 CAPSULE ORAL 2 TIMES DAILY PRN
Status: DISCONTINUED | OUTPATIENT
Start: 2019-10-21 | End: 2019-10-29 | Stop reason: HOSPADM

## 2019-10-21 RX ORDER — ISOSORBIDE MONONITRATE 60 MG/1
120 TABLET, EXTENDED RELEASE ORAL DAILY
Status: DISCONTINUED | OUTPATIENT
Start: 2019-10-22 | End: 2019-10-22

## 2019-10-21 RX ORDER — PREDNISONE 2.5 MG/1
2.5 TABLET ORAL EVERY EVENING
Status: DISCONTINUED | OUTPATIENT
Start: 2019-10-21 | End: 2019-10-29 | Stop reason: HOSPADM

## 2019-10-21 RX ORDER — VORICONAZOLE 200 MG/1
200 TABLET, FILM COATED ORAL EVERY 12 HOURS
Status: DISCONTINUED | OUTPATIENT
Start: 2019-10-21 | End: 2019-10-29

## 2019-10-21 RX ORDER — CARVEDILOL 25 MG/1
25 TABLET ORAL 2 TIMES DAILY WITH MEALS
Status: DISCONTINUED | OUTPATIENT
Start: 2019-10-21 | End: 2019-10-22

## 2019-10-21 RX ORDER — SODIUM CHLORIDE 9 MG/ML
INJECTION, SOLUTION INTRAVENOUS CONTINUOUS
Status: DISCONTINUED | OUTPATIENT
Start: 2019-10-21 | End: 2019-10-22

## 2019-10-21 RX ORDER — MAGNESIUM OXIDE 400 MG/1
400 TABLET ORAL DAILY
Status: DISCONTINUED | OUTPATIENT
Start: 2019-10-21 | End: 2019-10-21

## 2019-10-21 RX ORDER — LIDOCAINE 40 MG/G
CREAM TOPICAL
Status: DISCONTINUED | OUTPATIENT
Start: 2019-10-21 | End: 2019-10-29 | Stop reason: HOSPADM

## 2019-10-21 RX ORDER — ONDANSETRON 2 MG/ML
4 INJECTION INTRAMUSCULAR; INTRAVENOUS EVERY 6 HOURS PRN
Status: DISCONTINUED | OUTPATIENT
Start: 2019-10-21 | End: 2019-10-29 | Stop reason: HOSPADM

## 2019-10-21 RX ADMIN — SODIUM CHLORIDE 1000 ML: 9 INJECTION, SOLUTION INTRAVENOUS at 15:51

## 2019-10-21 RX ADMIN — PANTOPRAZOLE SODIUM 40 MG: 40 TABLET, DELAYED RELEASE ORAL at 19:21

## 2019-10-21 RX ADMIN — CARVEDILOL 25 MG: 25 TABLET, FILM COATED ORAL at 19:22

## 2019-10-21 RX ADMIN — PREDNISONE 2.5 MG: 2.5 TABLET ORAL at 19:22

## 2019-10-21 RX ADMIN — VORICONAZOLE 200 MG: 200 TABLET, FILM COATED ORAL at 20:51

## 2019-10-21 RX ADMIN — SODIUM BICARBONATE 650 MG TABLET 1300 MG: at 22:34

## 2019-10-21 RX ADMIN — SODIUM CHLORIDE: 9 INJECTION, SOLUTION INTRAVENOUS at 19:24

## 2019-10-21 ASSESSMENT — MIFFLIN-ST. JEOR
SCORE: 1210.04
SCORE: 1217.29

## 2019-10-21 ASSESSMENT — ACTIVITIES OF DAILY LIVING (ADL): ADLS_ACUITY_SCORE: 11

## 2019-10-21 NOTE — ED NOTES
Bed: IN02  Expected date: 10/21/19  Expected time:   Means of arrival:   Comments:  Kecia Blue (MRN [id number])  Being referred by Lung Tx for evaluation of elevated creatinine 4.7.  Double Lung tx 2018

## 2019-10-21 NOTE — ED TRIAGE NOTES
Pt arrives to triage from home with complaints of bloating and elevated creatine level. Pt reports she had labs taken on Saturday and her creatine level was 4.7. Pt recently admitted and medications changed. Pt reports increased bloating Sunday evening. Weight gain. A&O, VSS in triage.

## 2019-10-21 NOTE — PLAN OF CARE
Pt A/Ox4, VSS and denies pain. On renal diet. On contact ISO for MRSA. L PIV, IVMF (NS 0.9% at 100/hr) ordered, still need to be hung. Urine sample to be collected, pt aware and hat in place. Up indep to toilet. A/P CT done, see results review. Cr 5.9 this AM, now 5.76. CO2 critically low at 10 (ED result) and MD paged of this result. Will continue to monitor pt and follow POC.

## 2019-10-21 NOTE — TELEPHONE ENCOUNTER
Creat 4.73 on 10/19/19 (this was drawn while receiving IV fluid per patient, this was also after receiving 1L on 10/17 and 10/18). Discussed with patient. She reports not able to drink or eat much PO (about 40-50 oz fluid max). Still having bloating. Tried gas-x which didn't help much. Also struggling with constipation occasionally which she takes Miralax and Senna. Discussed with Ame. Plan to have patient come to [facility] ER for further eval, report called to LOUISE Lyon.

## 2019-10-21 NOTE — ED NOTES
[facility], [place]   ED Nurse to Floor Handoff     Kecia Blue is a 58 year old female who speaks English and lives with a spouse,  in a home  They arrived in the ED by car from home    ED Chief Complaint: Abnormal Labs and Bloated    ED Dx;   Final diagnoses:   Acute kidney injury (H)         Needed?: No    Allergies: No Known Allergies.  Past Medical Hx:   Past Medical History:   Diagnosis Date     Antisynthetase syndrome (H) 2014     Chronic cough      Chronic infection     recent C diff  8/18     Dehydration 8/1/2018     Dermatomyositis (H)      Dysplasia of cervix, low grade (ESTRADA 1)      ILD (interstitial lung disease) (H)      Osteopenia      PONV (postoperative nausea and vomiting)      Pulmonary hypertension (H)      Raynaud's disease      Seronegative rheumatoid arthritis (H)       Baseline Mental status: WDL  Current Mental Status changes: at basesline    Infection present or suspected this encounter: no  Sepsis suspected: No  Isolation type: Contact     Activity level - Baseline/Home:  Independent  Activity Level - Current:   Independent    Bariatric equipment needed?: No    In the ED these meds were given:   Medications   0.9% sodium chloride BOLUS (1,000 mLs Intravenous New Bag 10/21/19 3371)       Drips running?  No    Home pump  No    Current LDAs  Peripheral IV 10/21/19 Left Lower forearm (Active)   Number of days: 0       Labs results:   Labs Ordered and Resulted from Time of ED Arrival Up to the Time of Departure from the ED   CBC WITH PLATELETS DIFFERENTIAL - Abnormal; Notable for the following components:       Result Value    RBC Count 3.34 (*)     Hemoglobin 9.4 (*)     Hematocrit 31.4 (*)     MCHC 29.9 (*)     RDW 19.4 (*)     Platelet Count 134 (*)     Absolute Lymphocytes 0.4 (*)     All other components within normal limits   COMPREHENSIVE METABOLIC PANEL - Abnormal; Notable for the following components:    Sodium 132 (*)     Carbon Dioxide 14  "(*)     Glucose 111 (*)     Urea Nitrogen 67 (*)     Creatinine 5.90 (*)     GFR Estimate 7 (*)     GFR Estimate If Black 8 (*)     Albumin 3.0 (*)     Alkaline Phosphatase 194 (*)     All other components within normal limits   ISTAT  GASES LACTATE WILL POCT - Abnormal; Notable for the following components:    Ph Venous 7.25 (*)     PCO2 Venous 29 (*)     Bicarbonate Venous 13 (*)     Lactic Acid 0.5 (*)     All other components within normal limits   LIPASE   ROUTINE UA WITH MICROSCOPIC   SODIUM RANDOM URINE   CREATININE RANDOM URINE   PERIPHERAL IV CATHETER   ISTAT CG4 GASES LACTATE WILL NURSING POCT       Imaging Studies: No results found for this or any previous visit (from the past 24 hour(s)).    Recent vital signs:   BP 98/50   Pulse 65   Temp 97.3  F (36.3  C) (Oral)   Resp 18   Ht 1.626 m (5' 4\")   Wt 63.5 kg (140 lb)   LMP 06/07/2014 (Exact Date)   SpO2 97%   BMI 24.03 kg/m      Jan Coma Scale Score: 15 (10/21/19 1557)       Cardiac Rhythm: Normal Sinus  Pt needs tele? No  Skin/wound Issues: None    Code Status: Full Code    Pain control: pt had none    Nausea control: pt had none    Abnormal labs/tests/findings requiring intervention: See EPIC    Family present during ED course? Yes   Family Comments/Social Situation comments: NA    Tasks needing completion: None    Chano Kraus, RN  [phone] [facility]    "

## 2019-10-21 NOTE — ED NOTES
"  ED Triage Provider Note  [facility]  Encounter Date: Oct 21, 2019  2:46 PM     History:  Chief Complaint   Patient presents with     Abnormal Labs     Bloated     Kecia Blue is a 58 year old female with a history of bilateral lung transplant who presents with elevated creatinine level.  The patient was recently admitted in early October.  During her hospitalization, she was having difficulty with hypertension.  She had some of her blood pressure medications adjusted.  After discharge, the patient has had outpatient labs which have revealed an elevated creatinine level.  She has been receiving IV fluids at an infusion center near her home as it was felt that her creatinine elevation might be prerenal in etiology as she has had difficulty with oral intake.  The patient also notices abdominal bloating.  Labs drawn yesterday revealed worsening creatinine to 4.73.      Review of Systems:  Review of Systems      Exam:  BP 98/50   Pulse 65   Temp 97.3  F (36.3  C) (Oral)   Resp 18   Ht 1.626 m (5' 4\")   Wt 63.5 kg (140 lb)   LMP 06/07/2014 (Exact Date)   SpO2 97%   BMI 24.03 kg/m    General: No acute distress. Appears stated age.   Cardio: Regular rate, extremities well perfused  Resp: Normal work of breathing, grossly normal respiratory rate.  Few crackles left base  Abdomen- distended but nontender.  Positive bowel sounds  Neuro: Alert. CN II-XII grossly intact. Grossly intact strength.       Medical Decision Making:  Patient arriving to the ED with problem as above. A medical screening exam was performed. Lab, urinalysis, and CT orders initiated from Triage. The patient is appropriate to wait in triage.     RICHARD SWEENEY MD, MD     Note created by RICHARD SWEENEY MD, MD on 10/21/2019 at 2:46 PM     Richard Sweeney MD  10/21/19 7138       Richard Sweeney MD  10/21/19 1150    "

## 2019-10-21 NOTE — H&P
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     Oliguric RADHA on CKD stage IV  Has had an elevated Cr of 1.2-1.4 since 6/2018. Had seen Nephrology Dr. Gamble on 8/7/19 for elevated creatinine to 1.8. Renal biopsy 8/2019 with arterionephrosclerosis and features concerning for calcineurin toxicity (tacrolimus). Of note, patient was started on Voriconazole during recent admission 10/6 for empyema with cx + aspergillus. Discussed with pharmacy today who discussed Voriconazole has a significant risk interaction for increasing tacrolimus levels. Of note, when voriconazole was initiated, Tacrolimus level was decreased. Recent [place] Trough on 10/17 was 27, however not a true trough as patient had taken her AM tacrolimus dose at that time. Pt presents with elevated Creatinine despite 3 day OP IV fluid bolus challenge (10/18-20). Reports hx of decreased UOP over the past few days. Denies any dysuria, frequency or urgency. No flank pain. CT scan on admission unremarkable for hydronephrosis or obstructing stone. Does report decreased PO intake and fluid intake due to abdominal bloating, and CT w/ new onset ascites. DDx for RADHA includes medication induced including tacrolimus toxicity in setting of voriconazole, interstitial nephritis from bactrim (however, this has been chronic medication), UTI vs pre-renal 2/2 poor oral intake. No s/sx of obstruction. Also has a history of worsening HTN over the past 6 months, and was recently started on  Imdur, carvedilol, in addition to PTA Amlodipine, therefore, could also be 2/2 underlying renal artery stenosis. Bladder scan unremarkable for retention. Given 1L bolus of NS in ED.  - Nephrology, consulted appreciate recommendations  - UA/UCx  - CK WNL (added given dermatomyositis)  - FeNA  - Renal US w/ doppler to assess for FRANCHESCA  - Monitor BP   - Will give additional MIVF of NS at 100cc/hr for 1L   - Trend BMP in AM   - Start sodium bicarbonate 1300mg BID per Neph recommendations   - Hold Tacrolimus and Bactrim per Pulmonology recs for now (see below). [place] Trough level at 10PM this evening.   - Follow-up Tacrolimus dosing per Pulm recs tomorrow.   - Avoid nephrotoxins and renally dose medications     Metabolic acidosis:   PH 7.25, CO2 29, PO2 35, HCO3 13. No lactic acidosis. No e/o DKA. Glu 138. Likely secondary to RADHA.  Repeat CO2 from BMP 10. No e/o increased work of breathing. AG 13 which is upper limits of normal, however, Nephrology discussed this could be falsely low due to low albumin.   - Add Beta-hydroxybutyrate per neph to assess for starvation ketosis.   - Start sodium bicarbonate 1300mg BID PO per Nephrology recommendations; no indication for bicarb gtt at this time  - Monitor BMP in AM     New onset ascites:   Pt reports 12lb weight gain in 3 days with CT showing e/o moderate ascites/anasarca increased since 9/12/19 and 10/1/19. No hx of cirrhosis. CT w/ mild hepatosplenomegaly. Reports abdominal bloating, early satiety and decreased appetite. No abdominal pain or tenderness, fever or leukocytosis. Do not suspect SBP. DDx includes portal HTN vs other cause which cannot r/o malignancy.   - Will need diagnostic paracentesis for new onset ascites   - Abdominal US w/ doppler     Hx of Bilateral Lung transplant for ILD:   Transplanted in 3/2018; course c/b R main bronchial stenosis requiring repeated dilatation, + DSAs, CMV viremia s/p valganciclovir with undetectable viral loads off therapy. Not on PTA  O2 use. On PTA prednisone 5mg qAM and 2.5mg qPM and tacrolimus 2.5mg qAM and 2.0mg qPM and Bactrim 400-80mg daily for prophylaxis. Currently on room air.   - D/W Dr. Macias, Pulmonology.   - Given RADHA and no current tacrolimus level; will obtain tacrolimus trough at 10PM. Will hold next tacrolimus dose pending trough level.  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 CT guided bx 10/8 with purulent fluid positive for Aspergillus fumigatus in cultures. ID was consulted during that admission and patient was started on Voriconazole and is continued on Vori 200mg BID. CT surgery was consulted and discussed significant risk of morbidity w/ surgery and recommended tx w/ antifungal and serial CT scan for f/u.  - Continue Voriconazole 200mg BID for now  - Consider consulting Transplant ID in AM in setting of RADHA for other option/management   - Patient will need to repeat CT scan of empyema (was recommended 3-4 weeks after last discharge)     Hx of low level CMV viremia:   Peak viral load >3M in 8/2018, was on ganciclovir until 7/25/19. Currently biweekly monitoring of low level CMV viremia. Not detected 10/6/19.   - Transplant Pulmonology consulted     HTN:   On PTA Amlodipine 10mg daily, Carvedilol 25mg BID, and Imdur 120mg daily. Pt reports BP had been increasing and labile over the past 6 months. Was started on Carvedilol, Imdur during previous hospitalization.   - Continue PTA medications w/ hold parameters  - Renal US with doppler to assess for FRANCHESCA as above     Gallbladder wall thickening with cholelithiasis  Mild wall thickening of cecus,  ascending colon, and splenic flexture:   CT w/ Gallbladder wall thickening w/ cholelithasis. Findings may represent reactive changes to ascites. Acute cholecystitis cannot be ruled out.  This has been evidence on previous imaging studies.  MRI abd 9/12 w/o choledocholithaisis or obstructing mass. No RUQ pain. No WBC or elevated total bili, ALT or AST. Do not have concern for cholecystitis at this time. CT also with mild wall thickening involving cecus, ascending colon, and splenic flexture could be reactive; vs mild colitis is also on ddx per Radiology. On exam, abdominal exam is benign.   - Monitor  - Please page medicine with any increasing abdominal pain, N/V.     Dermatomyositis  Seronegative RA:   No active treatments. Has had increasing bilateral knee pain which has been assessed with OP MRI. Follows with Rheumatology as OP.     Anemia  Thrombocytopenia:   Hgb 9.4 on admission. BL appears 8-9. Plt 134 on admission. Newly thrombocytopenic since 10/6.   - Trend CBC w diff In AM   - Continue ferrous sulfate daily     Diet: renal diet   Fluids: MIVF of NS at 100cc/hr   DVT Prophylaxis: Mechanical prophylaxis   Code Status: Full Code     Disposition Plan   Expected discharge: 4 - 7 days; recommended to prior living arrangement once work-up for RADHA and ascites has been completed and pending safe disposition.     Patient was seen and case was discussed with Dr. Briggs who agrees with plan of care.     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     Patient reports that she was seen by her OP Pulmonologist and had labs drawn recently showing a worsening renal function. She was trialed with 3 days of IV fluid bolus without improvement.     She also reports a 1 week history of abdominal fullness and bloating. She notes gaining ~12lbs over the past 3 days. She notes having a decreased appetite since her transplant, but notes it has gotten worse in setting of her bloated abdomen. She denies any vomiting, but does report nausea. No chest pain, increased SOB or cough. No fever or chills. Reports having on-off diarrhea/constipation which is unchanged. No hematochezia or melena. No recent NSAID use.     She was recently hospitalized for LLL empyema found to be positive for aspergillus and was started on Voriconazole. She states she had her Tacrolimus level checked on 10/17, which was elevated but she states she took her AM tacrolimus medication that day so the lab was not accurate. She states she had a recent level drawn this past Saturday, however there was no order placed so she brought the blood tube with her.     Patient reports decreased urine output over the past few days. Has not yet urinated since this AM. No dysuria, frequency, urgency or flank pain. No hematuria.     States she was recently started on Remeron, but was told to discontinue this in case it was causing bloating. Only took two days worth. No other new medications.     Review of Systems   10 point ROS performed and negative unless otherwise noted in HPI    Past Medical History    I have reviewed this patient's medical history and updated it with pertinent information if needed.   Past Medical History:   Diagnosis Date     Antisynthetase syndrome (H) 2014     Chronic cough      Chronic infection     recent C diff  8/18     Dehydration 8/1/2018     Dermatomyositis (H)      Dysplasia of cervix, low grade (ESTRADA 1)      ILD (interstitial lung disease) (H)       Osteopenia      PONV (postoperative nausea and vomiting)      Pulmonary hypertension (H)      Raynaud's disease      Seronegative rheumatoid arthritis (H)        Past Surgical History   I have reviewed this patient's surgical history and updated it with pertinent information if needed.  Past Surgical History:   Procedure Laterality Date     BRONCHOSCOPY (RIGID OR FLEXIBLE), DIAGNOSTIC N/A 4/10/2018    Procedure: COMBINED BRONCHOSCOPY (RIGID OR FLEXIBLE), LAVAGE;;  Surgeon: Mariposa Donohue MD;  Location: [facility]     BRONCHOSCOPY (RIGID OR FLEXIBLE), DILATE BRONCHUS / TRACHEA N/A 10/11/2018    Procedure: BRONCHOSCOPY (RIGID OR FLEXIBLE), DILATE BRONCHUS / TRACHEA;  Flexible And Rigid Bronchoscopy and Dilation;  Surgeon: Wilber Lin MD;  Location: [facility]     BRONCHOSCOPY FLEXIBLE N/A 3/13/2018    Procedure: BRONCHOSCOPY FLEXIBLE;  Flexible Bronchoscopy ;  Surgeon: Gissell Sanchez MD;  Location: [facility]     BRONCHOSCOPY FLEXIBLE N/A 5/9/2018    Procedure: BRONCHOSCOPY FLEXIBLE;;  Surgeon: Wilber Lin MD;  Location: [facility]     BRONCHOSCOPY FLEXIBLE AND RIGID N/A 9/10/2018    Procedure: BRONCHOSCOPY FLEXIBLE AND RIGID;  Flexible and Rigid Bronchoscopy with Balloon Dilation, tissue debulking with cryo, and Right mainstem bronchus stent placement;  Surgeon: Wilber Lin MD;  Location: [facility]     BRONCHOSCOPY RIGID N/A 6/6/2018    Procedure: BRONCHOSCOPY RIGID;;  Surgeon: Lopez Macias MD;  Location: [facility]     BRONCHOSCOPY, DILATE BRONCHUS, STENT BRONCHUS, COMBINED N/A 6/11/2018    Procedure: COMBINED BRONCHOSCOPY, DILATE BRONCHUS, STENT BRONCHUS;  Flexible Bronchoscopy, Balloon Dilation, Bronchial Washings;  Surgeon: Wilber Lin MD;  [facility]: [facility]     BRONCHOSCOPY, DILATE BRONCHUS, STENT BRONCHUS, COMBINED Right 7/10/2018    Procedure: COMBINED BRONCHOSCOPY, DILATE BRONCHUS, STENT BRONCHUS;  Flexible Bronchoscopy, Balloon Dilation, Bronchial Washings  ;  Surgeon:  Wilber Lin MD;  Location: [facility]     BRONCHOSCOPY, DILATE BRONCHUS, STENT BRONCHUS, COMBINED N/A 8/2/2018    Procedure: COMBINED BRONCHOSCOPY, DILATE BRONCHUS, STENT BRONCHUS;  Flexible Bronchoscopy, Bronchial Washings, Balloon Dilation;  Surgeon: Wilber Lin MD;  [facility]: [facility]     BRONCHOSCOPY, DILATE BRONCHUS, STENT BRONCHUS, COMBINED N/A 8/20/2018    Procedure: COMBINED BRONCHOSCOPY, DILATE BRONCHUS, STENT BRONCHUS;  Flexible Bronchoscopy, Balloon Dilation;  Surgeon: Wilber Lin MD;  [facility]: [facility]     BRONCHOSCOPY, DILATE BRONCHUS, STENT BRONCHUS, COMBINED N/A 10/29/2018    Procedure: Flexible Bronchoscopy, Balloon Dilation, Stent Revision, Airway Examination And Therapeutic Suctioning, Cyro Tumor Debulking;  Surgeon: Wilber Lin MD;  Location: [facility]     BRONCHOSCOPY, DILATE BRONCHUS, STENT BRONCHUS, COMBINED N/A 11/20/2018    Procedure: Rigid Bronchoscopy, Stent Removal and dilitation;  Surgeon: Wilber Lin MD;  [facility]: [facility]     BRONCHOSCOPY, DILATE BRONCHUS, STENT BRONCHUS, COMBINED N/A 12/14/2018    Procedure: Flexible And Rigid Bronchoscopy, Balloon Dilation, Bronchial Washing;  Surgeon: Wilber Lin MD;  [facility]: [facility]     BRONCHOSCOPY, DILATE BRONCHUS, STENT BRONCHUS, COMBINED N/A 1/17/2019    Procedure: Flexible And Rigid Bronchoscopy, Balloon Dilation.;  Surgeon: Wilber Lin MD;  [facility]: [facility]     BRONCHOSCOPY, DILATE BRONCHUS, STENT BRONCHUS, COMBINED N/A 3/7/2019    Procedure: Flexible and Rigid Bronchoscopy, Bronchial Washing, Balloon Dilation;  Surgeon: Wilber Lin MD;  [facility]     BRONCHOSCOPY, DILATE BRONCHUS, STENT BRONCHUS, COMBINED N/A 6/6/2019    Procedure: Rigid and Flexible Bronchoscopy, Balloon Dilation;  Surgeon: Wilber Lin MD;  [facility]     BRONCHOSCOPY, DILATE BRONCHUS, STENT BRONCHUS, COMBINED N/A 10/11/2019    Procedure: Flexible and Rigid Bronchoscopy, Balloon  Dilation, Bronchoalveolar Lagave;  Surgeon: Wilber Lin MD;  Location: [facility]     ENT SURGERY      tonsillectomy as a child     ESOPHAGOSCOPY, GASTROSCOPY, DUODENOSCOPY (EGD), COMBINED N/A 10/29/2018    Procedure: COMBINED ESOPHAGOSCOPY, GASTROSCOPY, DUODENOSCOPY (EGD) with biopsies and polypectomy;  Surgeon: Chente Bloom MD;  [facility]     INSERT EXTRACORPORAL MEMBRANE OXYGENATOR ADULT (OUTSIDE OR) N/A 2/27/2018    Procedure: INSERT EXTRACORPORAL MEMBRANE OXYGENATOR ADULT (OUTSIDE OR);  INSERT EXTRACORPORAL MEMBRANE OXYGENATOR ADULT (OUTSIDE OR) ;  Surgeon: Toby Hernanedz MD;  Location: [facility]     IR THORACENTESIS  9/13/2019     no prior surgery       REMOVE EXTRACORPORAL MEMBRANE OXYGENATOR ADULT N/A 3/3/2018    Procedure: REMOVE EXTRACORPORAL MEMBRANE OXYGENATOR ADULT;  Removal of Right Femoral Venous and Right Axillary Arterial Extracorporeal Membrane Oxygenator;  Surgeon: Toby Hernandez MD;  Location: [facility]     TRANSPLANT LUNG RECIPIENT SINGLE X2 Bilateral 3/1/2018    Procedure: TRANSPLANT LUNG RECIPIENT SINGLE X2;  Median Sternotomy, Extracorporeal Membrane Oxygenator, bilateral sequential lung transplant;  Surgeon: Toby Hernandez MD;  Location: [facility]       Social History   Social History     Tobacco Use     Smoking status: Never Smoker     Smokeless tobacco: Never Used   Substance Use Topics     Alcohol use: No     Alcohol/week: 1.0 standard drinks     Types: 1 Glasses of wine per week     Drug use: No       Family History   I have reviewed this patient's family history and updated it with pertinent information if needed.   Family History   Problem Relation Age of Onset     Hypertension Mother      Arthritis Mother      Pancreatic Cancer Father      Diabetes Father        Prior to Admission Medications   Prior to Admission Medications   Prescriptions Last Dose Informant Patient Reported? Taking?   acetaminophen (TYLENOL) 500 MG tablet   Yes No    Sig: Take 1,000 mg by mouth every 6 hours as needed for pain    amLODIPine (NORVASC) 10 MG tablet   No No   Sig: Take 1 tablet (10 mg) by mouth daily   calcium-vitamin D (CALTRATE) 600-400 MG-UNIT per tablet   Yes No   Sig: Take 1 tablet by mouth daily   carvedilol (COREG) 25 MG tablet   No No   Sig: Take 1 tablet (25 mg) by mouth 2 times daily (with meals)   dronabinol (MARINOL) 5 MG capsule   Yes No   Sig: Take 5 mg by mouth 2 times daily (before meals) as needed for nausea   ferrous sulfate (FEROSUL) 325 (65 Fe) MG tablet   No No   Sig: Take 1 tablet (325 mg) by mouth daily (with breakfast)   isosorbide mononitrate (IMDUR) 120 MG 24 HR ER tablet   No No   Sig: Take 1 tablet (120 mg) by mouth daily   magnesium oxide (MAG-OX) 400 MG tablet   Yes No   Sig: Take 400 mg by mouth daily    metoprolol tartrate (LOPRESSOR) 25 MG tablet   No No   Sig: Take 2 tablets (50 mg) by mouth 2 times daily   mirtazapine (REMERON SOL-TAB) 15 MG ODT   No No   Si tablet (15 mg) by Orally disintegrating tablet route At Bedtime   multivitamin, therapeutic with minerals (THERA-VIT-M) TABS tablet   No No   Sig: Take 1 tablet by mouth daily   ondansetron (ZOFRAN) 4 MG tablet   No No   Sig: Take 1 tablet (4 mg) by mouth every 12 hours as needed for nausea   order for DME   No No   Sig: Equipment being ordered: Nebulizer   order for DME   No No   Sig: Equipment being ordered: Nebulizer   pantoprazole (PROTONIX) 40 MG EC tablet   No No   Sig: Take 1 tablet (40 mg) by mouth 2 times daily   predniSONE (DELTASONE) 2.5 MG tablet   No No   Sig: Take two tablets (5mg) every AM and one tablet (2.5mg) every evening   senna-docusate (SENOKOT-S/PERICOLACE) 8.6-50 MG tablet   No No   Sig: Take 2 tablets by mouth 2 times daily as needed for constipation   sulfamethoxazole-trimethoprim (BACTRIM/SEPTRA) 400-80 MG tablet   No No   Sig: Take 1 tablet by mouth daily   tacrolimus (GENERIC EQUIVALENT) 0.5 MG capsule   No No   Sig: Take 1 capsule (0.5 mg)  "by mouth every morning Total dose: 2.5 mg in the AM and 2 mg in the PM   Patient taking differently: Take 0.5 mg by mouth every evening Total dose: 2 mg in the AM and 2.5 mg in the PM   tacrolimus (GENERIC EQUIVALENT) 1 MG capsule   No No   Sig: Take 2 capsules (2 mg) by mouth 2 times daily Total dose: 2.5 mg in the AM and 2 mg in the PM   Patient taking differently: Take 2 mg by mouth 2 times daily Total dose: 2 mg in the AM and 2.5 mg in the PM   voriconazole (VFEND) 200 MG tablet   No No   Sig: Take 1 tablet (200 mg) by mouth every 12 hours for 28 days      Facility-Administered Medications: None     Allergies   No Known Allergies    Physical Exam   /59   Pulse 68   Temp 95.3  F (35.2  C) (Oral)   Resp 16   Ht 1.626 m (5' 4\")   Wt 64.2 kg (141 lb 9.6 oz)   LMP 06/07/2014 (Exact Date)   SpO2 93%   BMI 24.31 kg/m    GENERAL: Alert and oriented x 3. No acute distress. Resting comfortably.   HEENT: Anicteric sclera. PERRL. Mucous membranes moist and without lesions.   CV: RRR. S1, S2. No murmurs appreciated.   RESPIRATORY: Effort normal on room air. Lungs with mild rales to lower lung fiends with no wheezing or rhonchi.   GI: Abdomen soft and mild to moderately distended, bowel sounds present. No tenderness, rebound, guarding.   MUSCULOSKELETAL: No joint swelling or tenderness.   NEUROLOGICAL: No focal deficits. Moves all extremities.   EXTREMITIES: No peripheral edema. Intact bilateral pedal pulses.   SKIN: No jaundice. No rashes.     Data   Data reviewed today: I reviewed all medications, new labs and imaging results over the last 24 hours.      "

## 2019-10-22 ENCOUNTER — APPOINTMENT (OUTPATIENT)
Dept: ULTRASOUND IMAGING | Facility: CLINIC | Age: 59
DRG: 682 | End: 2019-10-22
Attending: PHYSICIAN ASSISTANT
Payer: COMMERCIAL

## 2019-10-22 LAB
ALBUMIN FLD-MCNC: 0.8 G/DL
ALBUMIN SERPL-MCNC: 2.7 G/DL (ref 3.4–5)
ALP SERPL-CCNC: 181 U/L (ref 40–150)
ALT SERPL W P-5'-P-CCNC: 18 U/L (ref 0–50)
ANION GAP SERPL CALCULATED.3IONS-SCNC: 12 MMOL/L (ref 3–14)
APPEARANCE FLD: CLEAR
AST SERPL W P-5'-P-CCNC: 19 U/L (ref 0–45)
BILIRUB SERPL-MCNC: 0.4 MG/DL (ref 0.2–1.3)
BUN SERPL-MCNC: 66 MG/DL (ref 7–30)
CALCIUM SERPL-MCNC: 8.2 MG/DL (ref 8.5–10.1)
CHLORIDE SERPL-SCNC: 109 MMOL/L (ref 94–109)
CO2 SERPL-SCNC: 13 MMOL/L (ref 20–32)
COLOR FLD: YELLOW
CREAT SERPL-MCNC: 6.18 MG/DL (ref 0.52–1.04)
CREAT UR-MCNC: 220 MG/DL
CREAT UR-MCNC: 221 MG/DL
ERYTHROCYTE [DISTWIDTH] IN BLOOD BY AUTOMATED COUNT: 19.5 % (ref 10–15)
FRACT EXCRET NA UR+SERPL-RTO: 0.5 %
GFR SERPL CREATININE-BSD FRML MDRD: 7 ML/MIN/{1.73_M2}
GLUCOSE SERPL-MCNC: 127 MG/DL (ref 70–99)
GRAM STN SPEC: NORMAL
HCT VFR BLD AUTO: 29.7 % (ref 35–47)
HGB BLD-MCNC: 9 G/DL (ref 11.7–15.7)
INR PPP: 1.12 (ref 0.86–1.14)
LYMPHOCYTES NFR FLD MANUAL: 49 %
MAGNESIUM SERPL-MCNC: 2.1 MG/DL (ref 1.6–2.3)
MCH RBC QN AUTO: 28.3 PG (ref 26.5–33)
MCHC RBC AUTO-ENTMCNC: 30.3 G/DL (ref 31.5–36.5)
MCV RBC AUTO: 93 FL (ref 78–100)
NEUTS BAND NFR FLD MANUAL: 4 %
OTHER CELLS FLD MANUAL: 47 %
PHOSPHATE SERPL-MCNC: 7.3 MG/DL (ref 2.5–4.5)
PLATELET # BLD AUTO: 142 10E9/L (ref 150–450)
POTASSIUM SERPL-SCNC: 4.5 MMOL/L (ref 3.4–5.3)
PROT FLD-MCNC: 1.7 G/DL
PROT SERPL-MCNC: 6.7 G/DL (ref 6.8–8.8)
RBC # BLD AUTO: 3.18 10E12/L (ref 3.8–5.2)
SODIUM SERPL-SCNC: 133 MMOL/L (ref 133–144)
SODIUM UR-SCNC: 24 MMOL/L
SODIUM UR-SCNC: 27 MMOL/L
SPECIMEN SOURCE FLD: NORMAL
SPECIMEN SOURCE: NORMAL
TACROLIMUS BLD-MCNC: 31.6 UG/L (ref 5–15)
TME LAST DOSE: ABNORMAL H
WBC # BLD AUTO: 5.3 10E9/L (ref 4–11)
WBC # FLD AUTO: 44 /UL

## 2019-10-22 PROCEDURE — 85027 COMPLETE CBC AUTOMATED: CPT | Performed by: PHYSICIAN ASSISTANT

## 2019-10-22 PROCEDURE — 12000001 ZZH R&B MED SURG/OB UMMC

## 2019-10-22 PROCEDURE — 87205 SMEAR GRAM STAIN: CPT | Performed by: HOSPITALIST

## 2019-10-22 PROCEDURE — 76705 ECHO EXAM OF ABDOMEN: CPT

## 2019-10-22 PROCEDURE — 99232 SBSQ HOSP IP/OBS MODERATE 35: CPT | Performed by: HOSPITALIST

## 2019-10-22 PROCEDURE — 82042 OTHER SOURCE ALBUMIN QUAN EA: CPT | Performed by: HOSPITALIST

## 2019-10-22 PROCEDURE — 25000128 H RX IP 250 OP 636: Performed by: PHYSICIAN ASSISTANT

## 2019-10-22 PROCEDURE — 85610 PROTHROMBIN TIME: CPT | Performed by: PHYSICIAN ASSISTANT

## 2019-10-22 PROCEDURE — 0W9G3ZZ DRAINAGE OF PERITONEAL CAVITY, PERCUTANEOUS APPROACH: ICD-10-PCS | Performed by: INTERNAL MEDICINE

## 2019-10-22 PROCEDURE — 25000132 ZZH RX MED GY IP 250 OP 250 PS 637: Performed by: PHYSICIAN ASSISTANT

## 2019-10-22 PROCEDURE — 84100 ASSAY OF PHOSPHORUS: CPT | Performed by: PHYSICIAN ASSISTANT

## 2019-10-22 PROCEDURE — 87015 SPECIMEN INFECT AGNT CONCNTJ: CPT | Performed by: HOSPITALIST

## 2019-10-22 PROCEDURE — 84157 ASSAY OF PROTEIN OTHER: CPT | Performed by: HOSPITALIST

## 2019-10-22 PROCEDURE — 89051 BODY FLUID CELL COUNT: CPT | Performed by: HOSPITALIST

## 2019-10-22 PROCEDURE — 87070 CULTURE OTHR SPECIMN AEROBIC: CPT | Performed by: HOSPITALIST

## 2019-10-22 PROCEDURE — 80053 COMPREHEN METABOLIC PANEL: CPT | Performed by: PHYSICIAN ASSISTANT

## 2019-10-22 PROCEDURE — 93975 VASCULAR STUDY: CPT | Mod: TC,XS

## 2019-10-22 PROCEDURE — 25000131 ZZH RX MED GY IP 250 OP 636 PS 637: Performed by: PHYSICIAN ASSISTANT

## 2019-10-22 PROCEDURE — 83735 ASSAY OF MAGNESIUM: CPT | Performed by: PHYSICIAN ASSISTANT

## 2019-10-22 PROCEDURE — 49083 ABD PARACENTESIS W/IMAGING: CPT | Performed by: INTERNAL MEDICINE

## 2019-10-22 PROCEDURE — 36415 COLL VENOUS BLD VENIPUNCTURE: CPT | Performed by: PHYSICIAN ASSISTANT

## 2019-10-22 RX ORDER — CEFTRIAXONE 1 G/1
1 INJECTION, POWDER, FOR SOLUTION INTRAMUSCULAR; INTRAVENOUS EVERY 24 HOURS
Status: DISCONTINUED | OUTPATIENT
Start: 2019-10-22 | End: 2019-10-24

## 2019-10-22 RX ADMIN — Medication 1 TABLET: at 08:11

## 2019-10-22 RX ADMIN — MULTIPLE VITAMINS W/ MINERALS TAB 1 TABLET: TAB at 08:11

## 2019-10-22 RX ADMIN — AMLODIPINE BESYLATE 10 MG: 10 TABLET ORAL at 08:11

## 2019-10-22 RX ADMIN — VORICONAZOLE 200 MG: 200 TABLET, FILM COATED ORAL at 19:46

## 2019-10-22 RX ADMIN — CEFTRIAXONE 1 G: 1 INJECTION, POWDER, FOR SOLUTION INTRAMUSCULAR; INTRAVENOUS at 04:35

## 2019-10-22 RX ADMIN — SODIUM BICARBONATE 650 MG TABLET 1300 MG: at 19:47

## 2019-10-22 RX ADMIN — ISOSORBIDE MONONITRATE 120 MG: 60 TABLET, EXTENDED RELEASE ORAL at 08:12

## 2019-10-22 RX ADMIN — FERROUS SULFATE TAB 325 MG (65 MG ELEMENTAL FE) 325 MG: 325 (65 FE) TAB at 08:12

## 2019-10-22 RX ADMIN — VORICONAZOLE 200 MG: 200 TABLET, FILM COATED ORAL at 08:12

## 2019-10-22 RX ADMIN — SODIUM BICARBONATE 650 MG TABLET 1300 MG: at 08:11

## 2019-10-22 RX ADMIN — CARVEDILOL 25 MG: 25 TABLET, FILM COATED ORAL at 08:11

## 2019-10-22 RX ADMIN — PANTOPRAZOLE SODIUM 40 MG: 40 TABLET, DELAYED RELEASE ORAL at 08:12

## 2019-10-22 RX ADMIN — PREDNISONE 2.5 MG: 2.5 TABLET ORAL at 19:46

## 2019-10-22 RX ADMIN — PANTOPRAZOLE SODIUM 40 MG: 40 TABLET, DELAYED RELEASE ORAL at 19:46

## 2019-10-22 RX ADMIN — PREDNISONE 5 MG: 5 TABLET ORAL at 08:12

## 2019-10-22 ASSESSMENT — ACTIVITIES OF DAILY LIVING (ADL)
ADLS_ACUITY_SCORE: 11

## 2019-10-22 ASSESSMENT — MIFFLIN-ST. JEOR: SCORE: 1247.23

## 2019-10-22 NOTE — PROCEDURES
Consult and Procedure Service - Procedure Note    Attending: Coretta Ndiaye MD   Resident: None  Indication: Abdominal distention, discomfort, ascites  Risk Assessment: Low risk  Pre-procedure diagnosis: Ascites  Post-procedure diagnosis: Ascites  Procedure name: Diagnostic and therapeutic paracentesis    The risks and benefits of the procedure were explained to the patient who expressed understanding and opted to proceed.  Consent was obtained and placed in the chart.  A time out was performed.  An area of ascites was located with ultrasound and marked in the RLQ quadrant; the area was prepped and draped in the usual sterile fashion.  10 ml of 1% lidocaine was instilled with a 22 gauge needle and ascites located under real-time guidance. 2000 ml of yellow colored fluid was removed and sent for analysis and the area dressed.     Patient had some leaking ascites fluid from the procedure site immediately post-procedure.  Pressure dressing applied.  Please contact the Consult and Procedure Service if any complications or concerns arise.     Coretta Ndiaye MD  Internal Medicine Hospitalist  [phone]   DOS:  10/22/2019

## 2019-10-22 NOTE — CONSULTS
[facility]  Lung Transplant Consult  October 22, 2019      Kecia Blue MRN# [id number]   Age: 58 year old YOB: 1960     Date of Admission:  10/21/2019    I was asked to see Kecia Blue by  to assist in lung transplant management.      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  She was hospitalized on October 6-12 for chest pain.  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  She also had a positive galactomannan and Fungitell and voriconazole was initiated.  She also has a history of hypertension, stenotrophomonas airway infection, right mainstem bronchial stenosis, CMV viremia/reactivation positive DSA, C. difficile, hypertension and chronic kidney disease.  She presents now with abdominal bloating and increased shortness of breath.    Abdominal bloating: Abdominal CT from 10/21 images and report reviewed by me, moderate ascites/anasarca significantly increased from 9/12 and 10/1, gallbladder wall thickening with cholelithiasis soft tissue density in the medial left costophrenic angle is unchanged.  Small bilateral effusions.  Mild hepatosplenomegaly.  Mild wall thickening in the cecum, ascending colon and splenic flexure.  Abdominal ultrasound shows coarse hepatic echotexture, moderate ascites patent hepatic and portal vessels circumferential gallbladder wall thickening possible sludge versus gallbladder polyp.  Alkaline phosphatase is mildly elevated.  Bilirubin, ALT and AST are normal.  Ascites may be due to renal failure (see below) but the CT reading suggests there may have been some ascites on previous September and October imaging.  Need to consider  underlying liver disease.  Would consider hepatology consultation.    Acute on chronic kidney disease: The patient had stage III CKD but appears to have acute renal failure.  Tacrolimus level was 31.6 yesterday evening, 12 hours after her morning dose.  It has probably been gradually increasing since the addition of voriconazole at her last visit.  This is likely the cause of her acute kidney injury and possibly her ascites.  One factor that is somewhat perplexing is that her tacrolimus level was 5.3 on 10/10 and she was already developing ascites by 10/13, that seems a little too fast for ascites due to renal failure due to tacrolimus.  Although this is the most likely cause, a broader differential should be maintained.  Recommend renal consultation.    Pulmonary: The patient reports increased dyspnea.  This is likely due to pressure on her diaphragm from the ascites.  There is also likely a component of dyspnea related to compensation for a metabolic acidosis as she has breathe her CO2 down to 29 in order to maintain a pH of 7.25 on her blood gas.  Await further recommendations from the nephrology service but bicarb replacement or dialysis may need to be considered.  Tacrolimus will be held until her level falls below 10-12 and then will be reinitiated at a reduced dose.  Please continue home dose of prednisone.  The patient is only on 2 drug immunosuppression due to her history of leukopenia and CMV reactivation. Please check a CXR to evaluate for other causes.    CMV reactivation: Please monitor CMV weekly as the patient is at risk for CMV reactivation especially in the face of acute illness.    Aspergillus empyema: Continue voriconazole for now.  Please consult with pharmacy to determine if dose adjustment is required for her renal function.  If voriconazole is felt to be playing a role in her ascites (other than the interaction with tacrolimus level) may need to consider an alternative antifungal.  The patient  certainly needs ongoing fungal therapy for her empyema.    Thank you for including me in the care of this interesting and pleasant patient.  I look forward to following her with you.    Lopez Macias MD  [phone]          Chief Complaint:     Bloating and dyspnea on exertion         History of Present Illness:     History obtained from the patient and the medical record.    Patient was seen and examined by me.    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  She was hospitalized on October 6-12 for chest pain.  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  She also had a positive galactomannan and Fungitell and voriconazole was initiated.  She also has a history of hypertension, stenotrophomonas airway infection, right mainstem bronchial stenosis, CMV viremia/reactivation positive DSA, C. difficile, hypertension and chronic kidney disease.    The patient reports developing abdominal bloating 1 day after hospital discharge.  She has a history of constipation so initially attributed to previous bowel problems but found that the bloating was not relieved with bowel movement.  Bloating continued to progress all week.  She reports gaining 15 pounds in the 2 days prior to admission.  No fever, chills or night sweats.  No abdominal pain.  No nausea or vomiting.  She reports her appetite is poor.  She reports chronic constipation moving her bowels every 1 to 2 days but there is been no recent change in her bowel habits nor change in her stool.  No bright red blood per rectum.  Breathing is comfortable at rest.  She reports dyspnea with brief ambulation significantly increased from baseline.  She reports an occasional cough but no sputum production.  No chest pain.        Review of Systems:                     No ear pain, sore throat, sinus pain or rhinorrhea.  No visual  changes  No palpitations  Pulmonary and GI complaints as noted in history of present illness.  No rashes  No body aches  No swollen lymph nodes  No bleeding or bruising  No headaches  The remainder of a complete review of systems is otherwise negative except as noted in the history of present illness.         Past Medical and Surgical History:     Past Medical History:   Diagnosis Date     Antisynthetase syndrome (H) 2014     Chronic cough      Chronic infection     recent C diff  8/18     Dehydration 8/1/2018     Dermatomyositis (H)      Dysplasia of cervix, low grade (ESTRADA 1)      ILD (interstitial lung disease) (H)      Osteopenia      PONV (postoperative nausea and vomiting)      Pulmonary hypertension (H)      Raynaud's disease      Seronegative rheumatoid arthritis (H)      Past Surgical History:   Procedure Laterality Date     BRONCHOSCOPY (RIGID OR FLEXIBLE), DIAGNOSTIC N/A 4/10/2018    Procedure: COMBINED BRONCHOSCOPY (RIGID OR FLEXIBLE), LAVAGE;;  Surgeon: Mariposa Donohue MD;  Location:  GI     BRONCHOSCOPY (RIGID OR FLEXIBLE), DILATE BRONCHUS / TRACHEA N/A 10/11/2018    Procedure: BRONCHOSCOPY (RIGID OR FLEXIBLE), DILATE BRONCHUS / TRACHEA;  Flexible And Rigid Bronchoscopy and Dilation;  Surgeon: Wilber Lin MD;  Location: [facility]     BRONCHOSCOPY FLEXIBLE N/A 3/13/2018    Procedure: BRONCHOSCOPY FLEXIBLE;  Flexible Bronchoscopy ;  Surgeon: Gissell Sanchez MD;  Location: [facility]     BRONCHOSCOPY FLEXIBLE N/A 5/9/2018    Procedure: BRONCHOSCOPY FLEXIBLE;;  Surgeon: Wilber Lin MD;  Location: [facility]     BRONCHOSCOPY FLEXIBLE AND RIGID N/A 9/10/2018    Procedure: BRONCHOSCOPY FLEXIBLE AND RIGID;  Flexible and Rigid Bronchoscopy with Balloon Dilation, tissue debulking with cryo, and Right mainstem bronchus stent placement;  Surgeon: Wilber Lin MD;  Location: [facility]     BRONCHOSCOPY RIGID N/A 6/6/2018    Procedure: BRONCHOSCOPY RIGID;;  Surgeon: Lopez Macias,  MD;  Location: [facility]     BRONCHOSCOPY, DILATE BRONCHUS, STENT BRONCHUS, COMBINED N/A 6/11/2018    Procedure: COMBINED BRONCHOSCOPY, DILATE BRONCHUS, STENT BRONCHUS;  Flexible Bronchoscopy, Balloon Dilation, Bronchial Washings;  Surgeon: Wilber Lin MD;  Location: [facility]     BRONCHOSCOPY, DILATE BRONCHUS, STENT BRONCHUS, COMBINED Right 7/10/2018    Procedure: COMBINED BRONCHOSCOPY, DILATE BRONCHUS, STENT BRONCHUS;  Flexible Bronchoscopy, Balloon Dilation, Bronchial Washings  ;  Surgeon: Wilber Lin MD;  [facility]: [facility]     BRONCHOSCOPY, DILATE BRONCHUS, STENT BRONCHUS, COMBINED N/A 8/2/2018    Procedure: COMBINED BRONCHOSCOPY, DILATE BRONCHUS, STENT BRONCHUS;  Flexible Bronchoscopy, Bronchial Washings, Balloon Dilation;  Surgeon: Wilber Lin MD;  [facility]     BRONCHOSCOPY, DILATE BRONCHUS, STENT BRONCHUS, COMBINED N/A 8/20/2018    Procedure: COMBINED BRONCHOSCOPY, DILATE BRONCHUS, STENT BRONCHUS;  Flexible Bronchoscopy, Balloon Dilation;  Surgeon: Wilber Lin MD;  [facility]     BRONCHOSCOPY, DILATE BRONCHUS, STENT BRONCHUS, COMBINED N/A 10/29/2018    Procedure: Flexible Bronchoscopy, Balloon Dilation, Stent Revision, Airway Examination And Therapeutic Suctioning, Cyro Tumor Debulking;  Surgeon: Wilber Lin MD;  [facility]     BRONCHOSCOPY, DILATE BRONCHUS, STENT BRONCHUS, COMBINED N/A 11/20/2018    Procedure: Rigid Bronchoscopy, Stent Removal and dilitation;  Surgeon: Wilber Lin MD;  [facility]     BRONCHOSCOPY, DILATE BRONCHUS, STENT BRONCHUS, COMBINED N/A 12/14/2018    Procedure: Flexible And Rigid Bronchoscopy, Balloon Dilation, Bronchial Washing;  Surgeon: Wilber Lin MD;  [facility]     BRONCHOSCOPY, DILATE BRONCHUS, STENT BRONCHUS, COMBINED N/A 1/17/2019    Procedure: Flexible And Rigid Bronchoscopy, Balloon Dilation.;  Surgeon: Wilber Lin MD;  [facility]     BRONCHOSCOPY, DILATE BRONCHUS,  STENT BRONCHUS, COMBINED N/A 3/7/2019    Procedure: Flexible and Rigid Bronchoscopy, Bronchial Washing, Balloon Dilation;  Surgeon: Wilber Lin MD;  [facility]     BRONCHOSCOPY, DILATE BRONCHUS, STENT BRONCHUS, COMBINED N/A 6/6/2019    Procedure: Rigid and Flexible Bronchoscopy, Balloon Dilation;  Surgeon: Wilber Lin MD;  [facility]     BRONCHOSCOPY, DILATE BRONCHUS, STENT BRONCHUS, COMBINED N/A 10/11/2019    Procedure: Flexible and Rigid Bronchoscopy, Balloon Dilation, Bronchoalveolar Lagave;  Surgeon: Wilber Lin MD;  Location: [facility]     ENT SURGERY      tonsillectomy as a child     ESOPHAGOSCOPY, GASTROSCOPY, DUODENOSCOPY (EGD), COMBINED N/A 10/29/2018    Procedure: COMBINED ESOPHAGOSCOPY, GASTROSCOPY, DUODENOSCOPY (EGD) with biopsies and polypectomy;  Surgeon: Chente Bloom MD;  [facility]     INSERT EXTRACORPORAL MEMBRANE OXYGENATOR ADULT (OUTSIDE OR) N/A 2/27/2018    Procedure: INSERT EXTRACORPORAL MEMBRANE OXYGENATOR ADULT (OUTSIDE OR);  INSERT EXTRACORPORAL MEMBRANE OXYGENATOR ADULT (OUTSIDE OR) ;  Surgeon: Toby Hernandez MD;  Location: [facility]     IR THORACENTESIS  9/13/2019     no prior surgery       REMOVE EXTRACORPORAL MEMBRANE OXYGENATOR ADULT N/A 3/3/2018    Procedure: REMOVE EXTRACORPORAL MEMBRANE OXYGENATOR ADULT;  Removal of Right Femoral Venous and Right Axillary Arterial Extracorporeal Membrane Oxygenator;  Surgeon: Toby Hernandez MD;  Location: [facility]     TRANSPLANT LUNG RECIPIENT SINGLE X2 Bilateral 3/1/2018    Procedure: TRANSPLANT LUNG RECIPIENT SINGLE X2;  Median Sternotomy, Extracorporeal Membrane Oxygenator, bilateral sequential lung transplant;  Surgeon: Toby Hernandez MD;  Location: [facility]           Family History:     Family History   Problem Relation Age of Onset     Hypertension Mother      Arthritis Mother      Pancreatic Cancer Father      Diabetes Father            Social History:     Social  History     Socioeconomic History     Marital status:      Spouse name: Not on file     Number of children: Not on file     Years of education: Not on file     Highest education level: Not on file   Occupational History     Not on file   Social Needs     Financial resource strain: Not on file     Food insecurity:     Worry: Not on file     Inability: Not on file     Transportation needs:     Medical: Not on file     Non-medical: Not on file   Tobacco Use     Smoking status: Never Smoker     Smokeless tobacco: Never Used   Substance and Sexual Activity     Alcohol use: No     Alcohol/week: 1.0 standard drinks     Types: 1 Glasses of wine per week     Drug use: No     Sexual activity: Not on file   Lifestyle     Physical activity:     Days per week: Not on file     Minutes per session: Not on file     Stress: Not on file   Relationships     Social connections:     Talks on phone: Not on file     Gets together: Not on file     Attends Jehovah's witness service: Not on file     Active member of club or organization: Not on file     Attends meetings of clubs or organizations: Not on file     Relationship status: Not on file     Intimate partner violence:     Fear of current or ex partner: Not on file     Emotionally abused: Not on file     Physically abused: Not on file     Forced sexual activity: Not on file   Other Topics Concern     Parent/sibling w/ CABG, MI or angioplasty before 65F 55M? No   Social History Narrative    3/6/2019 - Lives with . Has three daughters. Four grandchildren (two ). No pets. Travelled previously to [facility]. Has visited [place] several times.             Allergies and Medications:   No Known Allergies  Medications Prior to Admission   Medication Sig Dispense Refill Last Dose     acetaminophen (TYLENOL) 500 MG tablet Take 1,000 mg by mouth every 6 hours as needed for pain    10/6/2019 at PM     amLODIPine (NORVASC) 10 MG tablet Take 1 tablet (10 mg) by mouth daily 30  tablet 11 10/6/2019 at AM     calcium-vitamin D (CALTRATE) 600-400 MG-UNIT per tablet Take 1 tablet by mouth daily   10/5/2019 at AM     carvedilol (COREG) 25 MG tablet Take 1 tablet (25 mg) by mouth 2 times daily (with meals) 60 tablet 0      dronabinol (MARINOL) 5 MG capsule Take 5 mg by mouth 2 times daily (before meals) as needed for nausea   10/4/2019 at PM     ferrous sulfate (FEROSUL) 325 (65 Fe) MG tablet Take 1 tablet (325 mg) by mouth daily (with breakfast) 60 tablet 2 10/6/2019 at AM     isosorbide mononitrate (IMDUR) 120 MG 24 HR ER tablet Take 1 tablet (120 mg) by mouth daily 30 tablet 0      magnesium oxide (MAG-OX) 400 MG tablet Take 400 mg by mouth daily    10/6/2019 at AM     metoprolol tartrate (LOPRESSOR) 25 MG tablet Take 2 tablets (50 mg) by mouth 2 times daily 60 tablet 11 10/6/2019 at AM     mirtazapine (REMERON SOL-TAB) 15 MG ODT 1 tablet (15 mg) by Orally disintegrating tablet route At Bedtime 30 tablet 0      multivitamin, therapeutic with minerals (THERA-VIT-M) TABS tablet Take 1 tablet by mouth daily 30 each 11 10/5/2019 at AM     ondansetron (ZOFRAN) 4 MG tablet Take 1 tablet (4 mg) by mouth every 12 hours as needed for nausea 60 tablet 0 10/5/2019 at PM     order for DME Equipment being ordered: Nebulizer 1 Device 1 Taking     order for DME Equipment being ordered: Nebulizer 1 Device 1 Taking     pantoprazole (PROTONIX) 40 MG EC tablet Take 1 tablet (40 mg) by mouth 2 times daily 60 tablet 11 10/6/2019 at AM     predniSONE (DELTASONE) 2.5 MG tablet Take two tablets (5mg) every AM and one tablet (2.5mg) every evening 90 tablet 11 10/6/2019 at AM     senna-docusate (SENOKOT-S/PERICOLACE) 8.6-50 MG tablet Take 2 tablets by mouth 2 times daily as needed for constipation 60 tablet 0 10/6/2019 at AM     sulfamethoxazole-trimethoprim (BACTRIM/SEPTRA) 400-80 MG tablet Take 1 tablet by mouth daily 90 tablet 3 10/6/2019 at AM     tacrolimus (GENERIC EQUIVALENT) 0.5 MG capsule Take 1 capsule (0.5  mg) by mouth every morning Total dose: 2.5 mg in the AM and 2 mg in the PM (Patient taking differently: Take 0.5 mg by mouth every evening Total dose: 2 mg in the AM and 2.5 mg in the PM) 90 capsule 3 10/6/2019 at AM     tacrolimus (GENERIC EQUIVALENT) 1 MG capsule Take 2 capsules (2 mg) by mouth 2 times daily Total dose: 2.5 mg in the AM and 2 mg in the PM (Patient taking differently: Take 2 mg by mouth 2 times daily Total dose: 2 mg in the AM and 2.5 mg in the PM) 360 capsule 3 10/6/2019 at AM     voriconazole (VFEND) 200 MG tablet Take 1 tablet (200 mg) by mouth every 12 hours for 28 days 56 tablet 0          Physical Exam:   Temp:  [95.2  F (35.1  C)-97.3  F (36.3  C)] 97.1  F (36.2  C)  Pulse:  [64-78] 70  Resp:  [16-20] 20  BP: ()/(50-71) 103/61  SpO2:  [91 %-99 %] 93 %    Intake/Output Summary (Last 24 hours) at 10/22/2019 1426  Last data filed at 10/22/2019 1407  Gross per 24 hour   Intake 3 ml   Output 50 ml   Net -47 ml       Constitutional:   Awake, alert and in no apparent distress     Eyes:   Nonicteric, DILIP     ENT:    oral mucosa moist without lesions  Auditory canals clear, TM's with a normal light reflex  Nasal mucosa excoriation, erythema     Neck:   Supple without supraclavicular or cervical lymphadenopathy     Lungs:   Dull to percussion and diminished airflow lower 1/3 bilaterally. Diffuse coarse wheeze  Left crackles. No rhonchi.  No wheezes.     Cardiovascular:   Normal S1 and S2.  RRR.  No murmur, gallop or rub.  Radial PT pulses normal and symmetric     Abdomen:   NABS, softly distended c/w ascites, nontender.  No HSM. Exam limited by ascites. Abd wall edema     Musculoskeletal:   2+ edema. (+)clubbing     Neurologic:   Alert and conversant. Cranial nerves II-XII intact.  Strength 5/5 and symmetric     Skin:   Warm, dry.  No rash on limited exam.           Data:   All laboratory and imaging data reviewed.    CMP  Recent Labs   Lab 10/22/19  1314 10/21/19  1202 10/21/19  1451     133 132*   POTASSIUM 4.5 5.0 4.5   CHLORIDE 109 109 107   CO2 13* 10* 14*   ANIONGAP 12 13 12   * 138* 111*   BUN 66* 66* 67*   CR 6.18* 5.76* 5.90*   GFRESTIMATED 7* 8* 7*   GFRESTBLACK 8* 9* 8*   CHELSEY 8.2* 8.0* 8.5   MAG 2.1 2.0  --    PHOS 7.3* 6.7*  --    PROTTOTAL 6.7*  --  7.2   ALBUMIN 2.7*  --  3.0*   BILITOTAL 0.4  --  0.3   ALKPHOS 181*  --  194*   AST 19  --  18   ALT 18  --  16     CBC  Recent Labs   Lab 10/22/19  1314 10/21/19  1451   WBC 5.3 6.3   RBC 3.18* 3.34*   HGB 9.0* 9.4*   HCT 29.7* 31.4*   MCV 93 94   MCH 28.3 28.1   MCHC 30.3* 29.9*   RDW 19.5* 19.4*   * 134*     INR  Recent Labs   Lab 10/22/19  1314   INR 1.12     Arterial Blood GasNo lab results found in last 7 days.  Urine Studies    Recent Labs   Lab Test 10/21/19  2240 09/12/19  0125 08/07/19  1512 03/04/18  1425 03/01/18  0528   URINEPH 5.0 7.5* 7.0 5.5 5.5   NITRITE Negative Negative Negative Negative Negative   LEUKEST Large* Negative Trace* Negative Negative   WBCU 115* 3 19* 5 4     CMV viral loads    Recent Labs   Lab Test 10/06/19  1444 09/12/19  1228 06/05/19  1155 03/07/19  1004 01/17/19  1207 01/16/19  0900 12/14/18  1054 12/13/18  0727 12/04/18  1103   CSPEC Plasma, EDTA anticoagulant EDTA PLASMA EDTA PLASMA Bronchial washing Right Plasma, EDTA anticoagulant Bronchial washing Plasma, EDTA anticoagulant Plasma, EDTA anticoagulant     No results found for: CMQNT, CMVQ  EBV viral loads     Recent Labs   Lab Test 08/01/18  0921   EBRES EBV DNA Not Detected     EBV DNA Copies/mL   Date Value Ref Range Status   08/01/2018 EBV DNA Not Detected EBVNEG^EBV DNA Not Detected [Copies]/mL Final     Respiratory Virus Testing    No results found for: RS, FLUAG   Sputum Cultures in the last 3 months:  Specimen Description   Date Value Ref Range Status   10/11/2019 Bronchial washing SPECIMEN 1  Final   10/11/2019 Bronchial washing SPECIMEN 1  Final   10/11/2019 Bronchial washing Left lower lobe  Final   10/11/2019 Bronchial  washing Left lower lobe  Final   10/11/2019 Bronchial washing Left lower lobe  Final   10/11/2019 Bronchial washing Left lower lobe  Final   10/11/2019 Bronchial washing Left lower lobe  Final    Culture Micro   Date Value Ref Range Status   10/11/2019   Preliminary    Culture received and in progress.  Positive AFB results are called as soon as detected.    Final report to follow in 7 to 8 weeks.     10/11/2019   Preliminary    Assayed at "[facility]., [address] [place] [id number] [phone]   10/11/2019 Aspergillus fumigatus  isolated   (A)  Final   10/11/2019   Final    No additional fungi cultured after 6 days incubation   10/11/2019 Unable to hold 4 weeks due to overgrowth of fungus  Final   10/11/2019 No growth after 11 days  Preliminary   10/11/2019 Light growth  Normal respiratory alo    Final   10/11/2019 Single colony  Aspergillus fumigatus   (A)  Final   10/11/2019 (A)  Final    Single colony  Actinomyces odontolyticus  Susceptibility testing not routinely done     10/11/2019 Plus  Light growth  Mixed respiratory alo   (A)  Final   10/11/2019   Final    This organism is part of normal lao, but on occasion, may be a true pathogen. Clinical   correlation must be applied to interpreting this microbiology result.          Attestation:

## 2019-10-22 NOTE — PLAN OF CARE
Pt admitted for RADHA. A&Ox4. VSS on RA. No c/o pain or nausea. Tolerates diet well w/ good appetite. NPO since MN for ultrasound this AM. PIV infusing NS @ 100ml/hr overnight, stopped this AM. Abx started overnight for possible UTI. Course crackles in lung sounds. Distended abd, firm on palpation but no tenderness. Abd becoming increasingly uncomfortable and full overnight. Oliguric, bladder scanned last evening for 170ml. Bladder scanned again this AM for 421, but only straight cathed for 20ml. Bladder scanner most likely picking up ascites fluid. 2 loose BMs this shift. Up independently to BR. Able to make needs known. Pulm to see today. Possible dx paracentesis today. Will continue with POC.

## 2019-10-22 NOTE — PLAN OF CARE
Admission/Transfer from: [facility]  2 RN skin assessment completed. Yes  Significant findings include: Blanchable redness around coccyx  WOC Nurse Consult Ordered? No

## 2019-10-22 NOTE — ED PROVIDER NOTES
History     Chief Complaint   Patient presents with     Abnormal Labs     Bloated     HPI  Kecia Blue is a 58 year old female who has a PMH notable for ILD s/p lung transplant (2018), anti-synthetase syndrome, dermatomyositis, and pulmonary HTN with recent admissions 9/11-9/15/19 for LUQ abdominal pain r/t empyema s/p unsuccessful thoracentesis, as well as 10/6-10/12 with a fungal empyema, presenting today w/ reports of elevated Cr in clinic and increasing abdominal distension.     Patient reports she was just discharged on the 12th, actually says was feeling fairly well while admitted and for a day thereafter at home after discharge. However since that time has developed some general malaise, worsening abdominal distension, and feeling fatigued and just generally unwell. No traumas or falls. Some ongoing chills, no fevers or diaphoresis. No HAs or neuro symptoms. No new chest or breathing symptoms. Maybe some nausea, but no vomiting. Some decreased urinary frequency, but no pain, no blood. No new change in bowel habits or new symptom. No rashes or skin changes. No other new symptoms or complaints at this time. Please see ROS for further details.     I have reviewed the Medications, Allergies, Past Medical and Surgical History, and Social History in the [employer] system.    Past Medical History:   Diagnosis Date     Antisynthetase syndrome (H) 2014     Chronic cough      Chronic infection     recent C diff  8/18     Dehydration 8/1/2018     Dermatomyositis (H)      Dysplasia of cervix, low grade (ESTRADA 1)      ILD (interstitial lung disease) (H)      Osteopenia      PONV (postoperative nausea and vomiting)      Pulmonary hypertension (H)      Raynaud's disease      Seronegative rheumatoid arthritis (H)        Past Surgical History:   Procedure Laterality Date     BRONCHOSCOPY (RIGID OR FLEXIBLE), DIAGNOSTIC N/A 4/10/2018    Procedure: COMBINED BRONCHOSCOPY (RIGID OR FLEXIBLE), LAVAGE;;  Surgeon: Mariposa Donohue  MD Licha;  Location: [facility]     BRONCHOSCOPY (RIGID OR FLEXIBLE), DILATE BRONCHUS / TRACHEA N/A 10/11/2018    Procedure: BRONCHOSCOPY (RIGID OR FLEXIBLE), DILATE BRONCHUS / TRACHEA;  Flexible And Rigid Bronchoscopy and Dilation;  Surgeon: Wilber Lin MD;  Location: [facility]     BRONCHOSCOPY FLEXIBLE N/A 3/13/2018    Procedure: BRONCHOSCOPY FLEXIBLE;  Flexible Bronchoscopy ;  Surgeon: Gissell Sanchez MD;  Location: [facility]     BRONCHOSCOPY FLEXIBLE N/A 5/9/2018    Procedure: BRONCHOSCOPY FLEXIBLE;;  Surgeon: Wilber Lin MD;  Location: [facility]     BRONCHOSCOPY FLEXIBLE AND RIGID N/A 9/10/2018    Procedure: BRONCHOSCOPY FLEXIBLE AND RIGID;  Flexible and Rigid Bronchoscopy with Balloon Dilation, tissue debulking with cryo, and Right mainstem bronchus stent placement;  Surgeon: Wilber Lin MD;  Location: [facility]     BRONCHOSCOPY RIGID N/A 6/6/2018    Procedure: BRONCHOSCOPY RIGID;;  Surgeon: Lopez Macias MD;  Location: [facility]     BRONCHOSCOPY, DILATE BRONCHUS, STENT BRONCHUS, COMBINED N/A 6/11/2018    Procedure: COMBINED BRONCHOSCOPY, DILATE BRONCHUS, STENT BRONCHUS;  Flexible Bronchoscopy, Balloon Dilation, Bronchial Washings;  Surgeon: Wilber Lin MD;  [facility]: [facility]     BRONCHOSCOPY, DILATE BRONCHUS, STENT BRONCHUS, COMBINED Right 7/10/2018    Procedure: COMBINED BRONCHOSCOPY, DILATE BRONCHUS, STENT BRONCHUS;  Flexible Bronchoscopy, Balloon Dilation, Bronchial Washings  ;  Surgeon: Wilber Lin MD;  [facility]     BRONCHOSCOPY, DILATE BRONCHUS, STENT BRONCHUS, COMBINED N/A 8/2/2018    Procedure: COMBINED BRONCHOSCOPY, DILATE BRONCHUS, STENT BRONCHUS;  Flexible Bronchoscopy, Bronchial Washings, Balloon Dilation;  Surgeon: Wilber Lin MD;  [facility]     BRONCHOSCOPY, DILATE BRONCHUS, STENT BRONCHUS, COMBINED N/A 8/20/2018    Procedure: COMBINED BRONCHOSCOPY, DILATE BRONCHUS, STENT BRONCHUS;  Flexible Bronchoscopy, Balloon Dilation;   Surgeon: Wilber Lin MD;  [facility]     BRONCHOSCOPY, DILATE BRONCHUS, STENT BRONCHUS, COMBINED N/A 10/29/2018    Procedure: Flexible Bronchoscopy, Balloon Dilation, Stent Revision, Airway Examination And Therapeutic Suctioning, Cyro Tumor Debulking;  Surgeon: Wilber Lin MD;  [facility]     BRONCHOSCOPY, DILATE BRONCHUS, STENT BRONCHUS, COMBINED N/A 11/20/2018    Procedure: Rigid Bronchoscopy, Stent Removal and dilitation;  Surgeon: Wilber Lin MD;  [facility]     BRONCHOSCOPY, DILATE BRONCHUS, STENT BRONCHUS, COMBINED N/A 12/14/2018    Procedure: Flexible And Rigid Bronchoscopy, Balloon Dilation, Bronchial Washing;  Surgeon: Wilber Lin MD;  [facility]     BRONCHOSCOPY, DILATE BRONCHUS, STENT BRONCHUS, COMBINED N/A 1/17/2019    Procedure: Flexible And Rigid Bronchoscopy, Balloon Dilation.;  Surgeon: Wilber Lin MD;  [facility]     BRONCHOSCOPY, DILATE BRONCHUS, STENT BRONCHUS, COMBINED N/A 3/7/2019    Procedure: Flexible and Rigid Bronchoscopy, Bronchial Washing, Balloon Dilation;  Surgeon: Wilber Lin MD;  [facility]     BRONCHOSCOPY, DILATE BRONCHUS, STENT BRONCHUS, COMBINED N/A 6/6/2019    Procedure: Rigid and Flexible Bronchoscopy, Balloon Dilation;  Surgeon: Wilber Lin MD;  [facility]     BRONCHOSCOPY, DILATE BRONCHUS, STENT BRONCHUS, COMBINED N/A 10/11/2019    Procedure: Flexible and Rigid Bronchoscopy, Balloon Dilation, Bronchoalveolar Lagave;  Surgeon: Wilber Lin MD;  Location[facility]     ENT SURGERY      tonsillectomy as a child     ESOPHAGOSCOPY, GASTROSCOPY, DUODENOSCOPY (EGD), COMBINED N/A 10/29/2018    Procedure: COMBINED ESOPHAGOSCOPY, GASTROSCOPY, DUODENOSCOPY (EGD) with biopsies and polypectomy;  Surgeon: Chente Bloom MD;  [facility]     INSERT EXTRACORPORAL MEMBRANE OXYGENATOR ADULT (OUTSIDE OR) N/A 2/27/2018    Procedure: INSERT EXTRACORPORAL MEMBRANE  OXYGENATOR ADULT (OUTSIDE OR);  INSERT EXTRACORPORAL MEMBRANE OXYGENATOR ADULT (OUTSIDE OR) ;  Surgeon: Toby Hernandez MD;  Location: [facility]     IR THORACENTESIS  9/13/2019     no prior surgery       REMOVE EXTRACORPORAL MEMBRANE OXYGENATOR ADULT N/A 3/3/2018    Procedure: REMOVE EXTRACORPORAL MEMBRANE OXYGENATOR ADULT;  Removal of Right Femoral Venous and Right Axillary Arterial Extracorporeal Membrane Oxygenator;  Surgeon: Toby Hernandez MD;  [facility]     TRANSPLANT LUNG RECIPIENT SINGLE X2 Bilateral 3/1/2018    Procedure: TRANSPLANT LUNG RECIPIENT SINGLE X2;  Median Sternotomy, Extracorporeal Membrane Oxygenator, bilateral sequential lung transplant;  Surgeon: Toby Hernandze MD;  Location[facility]       Family History   Problem Relation Age of Onset     Hypertension Mother      Arthritis Mother      Pancreatic Cancer Father      Diabetes Father        Social History     Tobacco Use     Smoking status: Never Smoker     Smokeless tobacco: Never Used   Substance Use Topics     Alcohol use: No     Alcohol/week: 1.0 standard drinks     Types: 1 Glasses of wine per week         Review of Systems   Constitutional: Positive for chills and fatigue. Negative for diaphoresis and fever.        Malaise, generally not feeling well   HENT: Negative.    Respiratory: Negative for cough and shortness of breath.    Cardiovascular: Negative for chest pain and palpitations.   Gastrointestinal: Positive for abdominal distention. Negative for abdominal pain, blood in stool, constipation, diarrhea, nausea and vomiting.   Genitourinary: Positive for decreased urine volume. Negative for dysuria, frequency, hematuria, urgency, vaginal bleeding and vaginal discharge.   Musculoskeletal: Negative for neck pain and neck stiffness.   Skin: Negative for color change and rash.   Allergic/Immunologic: Positive for immunocompromised state.   Neurological: Positive for weakness (generalized). Negative for  "seizures, syncope, light-headedness and numbness.   Psychiatric/Behavioral: Negative for suicidal ideas.   All other systems reviewed and are negative.      Physical Exam   BP: 98/50  Pulse: 65  Temp: 97.3  F (36.3  C)  Resp: 18  Height: 162.6 cm (5' 4\")  Weight: 63.5 kg (140 lb)  SpO2: 97 %      Physical Exam  CONSTITUTIONAL: Well-developed and well-nourished. Awake and alert. Non-toxic appearance. No acute distress.   HENT:   - Head: Normocephalic and atraumatic.   - Ears: Hearing and external ear grossly normal.   - Nose: Nose normal. No rhinorrhea. No epistaxis.   - Mouth/Throat: MMM  EYES: Conjunctivae and lids are normal. No scleral icterus.   NECK: Normal range of motion and phonation normal. Neck supple.  No tracheal deviation, no stridor. No edema or erythema noted.  CARDIOVASCULAR: Normal rate, regular rhythm and no appreciable abnormal heart sounds.  PULMONARY/CHEST: Normal work of breathing. No accessory muscle usage or stridor. No respiratory distress.  No appreciable abnormal breath sounds.  ABDOMEN: Soft, does have some soft distention, possible ascites.  No tenderness. No peritoneal findings, no rigidity, rebound or guarding.   MUSCULOSKELETAL: Extremities warm and seemingly well perfused. No edema or calf tenderness.  NEUROLOGIC: Awake, alert. Not disoriented. No seizure activity. GCS 15.  No focal/acute neuro findings appreciated  SKIN: Skin is warm and dry. No rash noted. No diaphoresis. No pallor.   PSYCHIATRIC: Normal mood and affect. Speech and behavior normal. Thought processes linear. Cognition and memory are normal.     Assessments & Plan (with Medical Decision Making)   IMPRESSION: 58 year old female w/ PMH notable for  ILD s/p lung transplant (2018), anti-synthetase syndrome, dermatomyositis, and pulmonary HTN with recent admissions 9/11-9/15/19 for LUQ abdominal pain r/t empyema s/p unsuccessful thoracentesis, as well as 10/6-10/12 with a fungal empyema, presenting today w/ reports of " elevated Cr in clinic and increasing abdominal distension.     Clinically, patient appears nontoxic, NAD.  Otherwise on examination, does have some soft abdominal distention, possible ascites but otherwise no significant/acute appearing findings on physical exam.    Ddx includes, but not limited to, increasing creatinine in the setting of prerenal/decrease intravascular volume (patient has had mildly decreased p.o. intake, but not significantly so), medication effect, other direct kidney injury, less likely post renal obstruction, though does have increased abdominal distention, though I think unlikely to be from lester urinary/bladder distention, will get imaging to further evaluate.    PLAN: Labs and CT ordered from Triage, added on FENA labs, will admit to IM for further evaluation/management  - IVF, monitoring    RESULTS:  - Labs: Cr increased up to 5.9 (from 1's earlier in October), BUN 67  --- Metabolic acidosis w/o lactate elevation, AG 12  -- Hgb 9.4, no leukocytosis  --- LFTs unremarkable, lipase normal  - Urine: FENA labs and UA ordered, no sample yet provided  - Imaging: CT pending at shift change/signout.    RE-EVALUATION:  - Pt otherwise continues to do well here in the ED, no acute issues or apparent concerning changes in vitals or clinical appearance.    DISCUSSIONS:  - w/ IM: Reviewed case. They will admit for further evaluation/management, will F/U CT  - w/ Patient: I have reviewed the available findings, plan with the patient and her loved one. They expressed understanding and agreement with this plan. All questions answered to the best of our ability at this time.      DISPOSITION/PLANNING:  - IMPRESSION: RADHA, decreased UOP  - DISPOSITION: Admit to IM for further evaluation/management  - PENDING: FENA labs, CT  - RECOMMENDATIONS: F/U CT, consider renal US, Pulm consult      ______________________________________________________________________        10/21/2019   [address] [facility]     Donny,  Olivia Roberto MD  10/23/19 [id number]

## 2019-10-22 NOTE — PROGRESS NOTES
Post Transplant Patient Social Work Assessment     Patient Name: Kecia Blue  : 1960  Age: 58 year old  MRN: [id number]  Date of transplant: 3/1/2018    Patient known to me from follow up in the transplant program.  Admitted on 10/21 for RADHA and new onset ascites.  Seen today to update assessment for supportive counseling.      Presenting Information   Living Situation: lives with her , Ranjan in single family, 2 story with basement in [place], [place]  Functional Status: independent in cares at home.    Cultural/Language/Spiritual Considerations: n/a    Support System  Primary Support Person spouse, Fabian  Other support:  Adult children   Plan for support in immediate post-hospital period: Ranjan available as needed.      Health Care Directive  Decision Maker: patient  Alternate Decision Maker: spouse  Health Care Directive: Copy in Chart    Mental Health/Coping:   History of Mental Health: none  History of Chemical Health: no issues  Current status: does not drink  Coping: support from family.  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

## 2019-10-22 NOTE — CONSULTS
Nephrology Initial Consult  October 22, 2019      Kecia Blue MRN:[id number] YOB: 1960  Date of Admission:10/21/2019  Primary care provider: Rosy Vu  Requesting physician: Chris Navarro*    ASSESSMENT AND RECOMMENDATIONS:   Kecia Blue is a 58 year old woman with CKD (biopsy proven CNI 8/2019) in setting of lung transplant (3/2018) for ILD with dermatomyositis, anti-synthase syndrome, pulmonary HTN and recent diagnosis aspergillus empyema treated with voriconzole who now has anuric RADHA with FK level ~30    1. 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  Anuria developed in the last 24 hours, creatinine worsened despite 1 L saline daily starting 5 days ago along with further fluid bolus on admission.    RADHA likely related to FK level of 30, less likely AIN or GN (UA shows WBC/RBC and 30 albumin with SG 1.018).  Low FENa likely related to vasoconstriction caused by tacrolimus.  She is quite volume overloaded and I would advise against further IV fluids.   - high tacrolimus is likely explanation for RADHA (causes vasoconstriction of afferent arteriole but also can cause renal limited thrombotic microangiopathy)  - continue supportive cares  - monitor renal function as FK level declines (primary team holding tacrolimus)  - no indication for dialysis but I did discuss that need may arise if renal function does not improve soon    2. Hypervolemia - with new ascites and mild hepatosplenomegaly on imaging, coarse echotexture of liver on ultrasound brings into question liver disease (previous imaging showed normal liver).   Breathing OK on room air, majority of respiratory symptoms seems to be related to ascites, I would support both diagnostic and therapeutic paracentesis  - OK for 2 L removal with para  - if respiratory distress overnight I would give bumex 5 mg IV  - further liver evaluation per primary team  - ascites can be seen in renal failure  when there is mild underlying liver disease    3. Acidosis - likely due to RADHA, continue sodium bicarbonate 1300 mg bid as suggested by my colleague overnight    Recommendations were communicated to primary team via discussion at the bedside    Yenni Gamble MD  [facility]  Department of Medicine  Division of Renal Disease and Hypertension  [phone]     REASON FOR CONSULT: RADHA    HISTORY OF PRESENT ILLNESS:  Admitting provider and nursing notes reviewed  Kecia Blue is a 58 year old woman with CKD 3 in setting of lung transplant for ILD. History also significant for Felisa-1 positive, SSA positive dermatomyositis.  She is admitted overnight for creatinine in the 5's from a baseline of 1-1.3 mg/dL.  Creatinine was found to be elevated last week on routine labs, she went to local ER and had 1 L isotonic crystalloid on 10/17 and 10/18 and despite this creatinine continue to rise from 4 to 5's.  With this she developed worsening abdominal bloating and distension, leg edema, facial edema with dyspnea on exertion and conversational dyspnea.  Breathing improves with lying down (but not completely supine). She has not made urine in the last 24 hours.   In the last several months, she has been hospitalized multiple times and during last hospital stay, 2 weeks ago, determined to have aspergillus empyema and started on voriconazole.  Kecia reports that tacrolimus dose was not decreased at that time.    No fever, she has chronic chills that are unchanged.  No nausea or vomiting.  She has both constipation and diarrhea that alternate.  No new rash, no new joint pains, no swollen lymph nodes.  No chest pain.  No dysuria.  She has not taken herbal supplements or NSAIDS.    PAST MEDICAL HISTORY:  Reviewed with patient on 10/22/2019   Past Medical History:   Diagnosis Date     Antisynthetase syndrome (H) 2014     Chronic cough      Chronic infection     recent C diff  8/18     Dehydration 8/1/2018      Dermatomyositis (H)      Dysplasia of cervix, low grade (ESTRADA 1)      ILD (interstitial lung disease) (H)      Osteopenia      PONV (postoperative nausea and vomiting)      Pulmonary hypertension (H)      Raynaud's disease      Seronegative rheumatoid arthritis (H)        Past Surgical History:   Procedure Laterality Date     BRONCHOSCOPY (RIGID OR FLEXIBLE), DIAGNOSTIC N/A 4/10/2018    Procedure: COMBINED BRONCHOSCOPY (RIGID OR FLEXIBLE), LAVAGE;;  Surgeon: Mariposa Donohue MD;  Location: [facility]     BRONCHOSCOPY (RIGID OR FLEXIBLE), DILATE BRONCHUS / TRACHEA N/A 10/11/2018    Procedure: BRONCHOSCOPY (RIGID OR FLEXIBLE), DILATE BRONCHUS / TRACHEA;  Flexible And Rigid Bronchoscopy and Dilation;  Surgeon: Wilber Lin MD;  [facility]     BRONCHOSCOPY FLEXIBLE N/A 3/13/2018    Procedure: BRONCHOSCOPY FLEXIBLE;  Flexible Bronchoscopy ;  Surgeon: Gissell Sanchez MD;  Location: [facility]     BRONCHOSCOPY FLEXIBLE N/A 5/9/2018    Procedure: BRONCHOSCOPY FLEXIBLE;;  Surgeon: Wilber Lin MD;  Location: [facility]     BRONCHOSCOPY FLEXIBLE AND RIGID N/A 9/10/2018    Procedure: BRONCHOSCOPY FLEXIBLE AND RIGID;  Flexible and Rigid Bronchoscopy with Balloon Dilation, tissue debulking with cryo, and Right mainstem bronchus stent placement;  Surgeon: Wilber Lin MD;  [facility]     BRONCHOSCOPY RIGID N/A 6/6/2018    Procedure: BRONCHOSCOPY RIGID;;  Surgeon: Lopez Macias MD;  Location: [facility]     BRONCHOSCOPY, DILATE BRONCHUS, STENT BRONCHUS, COMBINED N/A 6/11/2018    Procedure: COMBINED BRONCHOSCOPY, DILATE BRONCHUS, STENT BRONCHUS;  Flexible Bronchoscopy, Balloon Dilation, Bronchial Washings;  Surgeon: Wilber Lin MD;  [facility]     BRONCHOSCOPY, DILATE BRONCHUS, STENT BRONCHUS, COMBINED Right 7/10/2018    Procedure: COMBINED BRONCHOSCOPY, DILATE BRONCHUS, STENT BRONCHUS;  Flexible Bronchoscopy, Balloon Dilation, Bronchial Washings  ;  Surgeon: Wilber Lin  MD Alana;  [facility]     BRONCHOSCOPY, DILATE BRONCHUS, STENT BRONCHUS, COMBINED N/A 8/2/2018    Procedure: COMBINED BRONCHOSCOPY, DILATE BRONCHUS, STENT BRONCHUS;  Flexible Bronchoscopy, Bronchial Washings, Balloon Dilation;  Surgeon: Wilber Lin MD;  [facility]     BRONCHOSCOPY, DILATE BRONCHUS, STENT BRONCHUS, COMBINED N/A 8/20/2018    Procedure: COMBINED BRONCHOSCOPY, DILATE BRONCHUS, STENT BRONCHUS;  Flexible Bronchoscopy, Balloon Dilation;  Surgeon: Wilber Lin MD;  [facility]     BRONCHOSCOPY, DILATE BRONCHUS, STENT BRONCHUS, COMBINED N/A 10/29/2018    Procedure: Flexible Bronchoscopy, Balloon Dilation, Stent Revision, Airway Examination And Therapeutic Suctioning, Cyro Tumor Debulking;  Surgeon: Wilber Lin MD;  [facility]     BRONCHOSCOPY, DILATE BRONCHUS, STENT BRONCHUS, COMBINED N/A 11/20/2018    Procedure: Rigid Bronchoscopy, Stent Removal and dilitation;  Surgeon: Wilber Lin MD;  [facility]     BRONCHOSCOPY, DILATE BRONCHUS, STENT BRONCHUS, COMBINED N/A 12/14/2018    Procedure: Flexible And Rigid Bronchoscopy, Balloon Dilation, Bronchial Washing;  Surgeon: Wilber Lin MD;  [facility]     BRONCHOSCOPY, DILATE BRONCHUS, STENT BRONCHUS, COMBINED N/A 1/17/2019    Procedure: Flexible And Rigid Bronchoscopy, Balloon Dilation.;  Surgeon: Wilber Lin MD;  [facility]     BRONCHOSCOPY, DILATE BRONCHUS, STENT BRONCHUS, COMBINED N/A 3/7/2019    Procedure: Flexible and Rigid Bronchoscopy, Bronchial Washing, Balloon Dilation;  Surgeon: Wilber Lin MD;  [facility]     BRONCHOSCOPY, DILATE BRONCHUS, STENT BRONCHUS, COMBINED N/A 6/6/2019    Procedure: Rigid and Flexible Bronchoscopy, Balloon Dilation;  Surgeon: Wilber Lin MD;  [facility]     BRONCHOSCOPY, DILATE BRONCHUS, STENT BRONCHUS, COMBINED N/A 10/11/2019    Procedure: Flexible and Rigid Bronchoscopy, Balloon Dilation,  Georgette Whitman;  Surgeon: Wilber Lin MD;  [facility]     ENT SURGERY      tonsillectomy as a child     ESOPHAGOSCOPY, GASTROSCOPY, DUODENOSCOPY (EGD), COMBINED N/A 10/29/2018    Procedure: COMBINED ESOPHAGOSCOPY, GASTROSCOPY, DUODENOSCOPY (EGD) with biopsies and polypectomy;  Surgeon: Chente Bloom MD;  [facility]     INSERT EXTRACORPORAL MEMBRANE OXYGENATOR ADULT (OUTSIDE OR) N/A 2/27/2018    Procedure: INSERT EXTRACORPORAL MEMBRANE OXYGENATOR ADULT (OUTSIDE OR);  INSERT EXTRACORPORAL MEMBRANE OXYGENATOR ADULT (OUTSIDE OR) ;  Surgeon: Toby Hernandez MD;  [facility]     IR THORACENTESIS  9/13/2019     no prior surgery       REMOVE EXTRACORPORAL MEMBRANE OXYGENATOR ADULT N/A 3/3/2018    Procedure: REMOVE EXTRACORPORAL MEMBRANE OXYGENATOR ADULT;  Removal of Right Femoral Venous and Right Axillary Arterial Extracorporeal Membrane Oxygenator;  Surgeon: Toby Hernandez MD;  [facility]     TRANSPLANT LUNG RECIPIENT SINGLE X2 Bilateral 3/1/2018    Procedure: TRANSPLANT LUNG RECIPIENT SINGLE X2;  Median Sternotomy, Extracorporeal Membrane Oxygenator, bilateral sequential lung transplant;  Surgeon: Toby Hernandez MD;  [facility]        MEDICATIONS:  PTA Meds  Prior to Admission medications    Medication Sig Last Dose Taking? Auth Provider   acetaminophen (TYLENOL) 500 MG tablet Take 1,000 mg by mouth every 6 hours as needed for pain    Reported, Patient   amLODIPine (NORVASC) 10 MG tablet Take 1 tablet (10 mg) by mouth daily   Ame Chow PA-C   calcium-vitamin D (CALTRATE) 600-400 MG-UNIT per tablet Take 1 tablet by mouth daily   Ame Chow PA-C   carvedilol (COREG) 25 MG tablet Take 1 tablet (25 mg) by mouth 2 times daily (with meals)   Clarence Rabago MD   dronabinol (MARINOL) 5 MG capsule Take 5 mg by mouth 2 times daily (before meals) as needed for nausea   Unknown, Entered By History   ferrous sulfate  (FEROSUL) 325 (65 Fe) MG tablet Take 1 tablet (325 mg) by mouth daily (with breakfast)   Christelle Lopez MD   isosorbide mononitrate (IMDUR) 120 MG 24 HR ER tablet Take 1 tablet (120 mg) by mouth daily   Clarence Rabago MD   magnesium oxide (MAG-OX) 400 MG tablet Take 400 mg by mouth daily    Unknown, Entered By History   metoprolol tartrate (LOPRESSOR) 25 MG tablet Take 2 tablets (50 mg) by mouth 2 times daily   Ame Chow PA-C   mirtazapine (REMERON SOL-TAB) 15 MG ODT 1 tablet (15 mg) by Orally disintegrating tablet route At Bedtime   Clarence Rabago MD   multivitamin, therapeutic with minerals (THERA-VIT-M) TABS tablet Take 1 tablet by mouth daily   Alex Hale MD   ondansetron (ZOFRAN) 4 MG tablet Take 1 tablet (4 mg) by mouth every 12 hours as needed for nausea   Ame Chow PA-C   order for DME Equipment being ordered: Nebulizer   Wilber Lin MD   order for DME Equipment being ordered: Nebulizer   Wilber Lin MD   pantoprazole (PROTONIX) 40 MG EC tablet Take 1 tablet (40 mg) by mouth 2 times daily   Ame Chow PA-C   predniSONE (DELTASONE) 2.5 MG tablet Take two tablets (5mg) every AM and one tablet (2.5mg) every evening   Ame Chow PA-C   senna-docusate (SENOKOT-S/PERICOLACE) 8.6-50 MG tablet Take 2 tablets by mouth 2 times daily as needed for constipation   Christelle Lopez MD   sulfamethoxazole-trimethoprim (BACTRIM/SEPTRA) 400-80 MG tablet Take 1 tablet by mouth daily   Alex Hale MD   tacrolimus (GENERIC EQUIVALENT) 0.5 MG capsule Take 1 capsule (0.5 mg) by mouth every morning Total dose: 2.5 mg in the AM and 2 mg in the PM  Patient taking differently: Take 0.5 mg by mouth every evening Total dose: 2 mg in the AM and 2.5 mg in the PM   Ame Chow PA-C   tacrolimus (GENERIC EQUIVALENT) 1 MG capsule Take 2 capsules (2 mg) by mouth 2 times daily Total dose: 2.5 mg in the AM and 2 mg in the PM  Patient  taking differently: Take 2 mg by mouth 2 times daily Total dose: 2 mg in the AM and 2.5 mg in the PM   Ame Chow PA-C   voriconazole (VFEND) 200 MG tablet Take 1 tablet (200 mg) by mouth every 12 hours for 28 days   Clarence Rabago MD      Current Meds    amLODIPine  10 mg Oral Daily     calcium carbonate 600 mg-vitamin D 400 units  1 tablet Oral Daily     carvedilol  25 mg Oral BID w/meals     cefTRIAXone  1 g Intravenous Q24H     ferrous sulfate  325 mg Oral Daily with breakfast     isosorbide mononitrate  120 mg Oral Daily     multivitamin w/minerals  1 tablet Oral Daily     pantoprazole  40 mg Oral BID     predniSONE  5 mg Oral QAM    And     predniSONE  2.5 mg Oral QPM     sodium bicarbonate  1,300 mg Oral BID     sodium chloride (PF)  3 mL Intracatheter Q8H     voriconazole  200 mg Oral Q12H     Infusion Meds      ALLERGIES:    No Known Allergies    REVIEW OF SYSTEMS:  A comprehensive of systems was negative except as noted above.    SOCIAL HISTORY:   Social History     Socioeconomic History     Marital status:      Spouse name: Not on file     Number of children: Not on file     Years of education: Not on file     Highest education level: Not on file   Occupational History     Not on file   Social Needs     Financial resource strain: Not on file     Food insecurity:     Worry: Not on file     Inability: Not on file     Transportation needs:     Medical: Not on file     Non-medical: Not on file   Tobacco Use     Smoking status: Never Smoker     Smokeless tobacco: Never Used   Substance and Sexual Activity     Alcohol use: No     Alcohol/week: 1.0 standard drinks     Types: 1 Glasses of wine per week     Drug use: No     Sexual activity: Not on file   Lifestyle     Physical activity:     Days per week: Not on file     Minutes per session: Not on file     Stress: Not on file   Relationships     Social connections:     Talks on phone: Not on file     Gets together: Not on file     Attends  "Orthodoxy service: Not on file     Active member of club or organization: Not on file     Attends meetings of clubs or organizations: Not on file     Relationship status: Not on file     Intimate partner violence:     Fear of current or ex partner: Not on file     Emotionally abused: Not on file     Physically abused: Not on file     Forced sexual activity: Not on file   Other Topics Concern     Parent/sibling w/ CABG, MI or angioplasty before 65F 55M? No   Social History Narrative    3/6/2019 - Lives with . Has three daughters. Four grandchildren (two ). No pets. Travelled previously to [facility]. Has visited [place] several times.      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    FAMILY MEDICAL HISTORY:   Family History   Problem Relation Age of Onset     Hypertension Mother      Arthritis Mother      Pancreatic Cancer Father      Diabetes Father      Reviewed with patient     PHYSICAL EXAM:   Temp  Av.9  F (36.6  C)  Min: 95.2  F (35.1  C)  Max: 98.9  F (37.2  C)      Pulse  Av.7  Min: 64  Max: 100 Resp  Av.8  Min: 12  Max: 34  SpO2  Av.1 %  Min: 91 %  Max: 100 %       /61 (BP Location: Right arm)   Pulse 70   Temp 97.1  F (36.2  C) (Oral)   Resp 20   Ht 1.626 m (5' 4\")   Wt 67.2 kg (148 lb 3.2 oz)   LMP 2014 (Exact Date)   SpO2 93%   BMI 25.44 kg/m        Admit Weight: 63.5 kg (140 lb)     GENERAL APPEARANCE: no distress,  awake  EYES: no scleral icterus, pupils equal  Lymphatics: no cervical or supraclavicular LAD  Pulmonary: lungs clear to auscultation with equal breath sounds bilaterally, + clubbing  CV: regular rhythm, normal rate, no rub   - JVD note seen   - Edema 2+ at sacrum with edema of legs and face  GI: distended, nontender, normal bowel sounds  MS: no evidence of inflammation in joints, no muscle tenderness  : no rivas  SKIN: no rash, warm, dry, no cyanosis  NEURO: face symmetric, speech fluent, no " asterixis     LABS:   CMP  Recent Labs   Lab 10/21/19  1752 10/21/19  1451    132*   POTASSIUM 5.0 4.5   CHLORIDE 109 107   CO2 10* 14*   ANIONGAP 13 12   * 111*   BUN 66* 67*   CR 5.76* 5.90*   GFRESTIMATED 8* 7*   GFRESTBLACK 9* 8*   CHELSEY 8.0* 8.5   MAG 2.0  --    PHOS 6.7*  --    PROTTOTAL  --  7.2   ALBUMIN  --  3.0*   BILITOTAL  --  0.3   ALKPHOS  --  194*   AST  --  18   ALT  --  16     CBC  Recent Labs   Lab 10/21/19  1451   HGB 9.4*   WBC 6.3   RBC 3.34*   HCT 31.4*   MCV 94   MCH 28.1   MCHC 29.9*   RDW 19.4*   *     INRNo lab results found in last 7 days.  ABGNo lab results found in last 7 days.   URINE STUDIES  Recent Labs   Lab Test 10/21/19  2240 09/12/19  0125 08/07/19  1512 03/04/18  1425   COLOR Yellow Light Yellow Yellow Yellow   APPEARANCE Cloudy Clear Clear Slightly Cloudy   URINEGLC Negative Negative Negative Negative   URINEBILI Negative Negative Negative Negative   URINEKETONE Negative 10* Negative Negative   SG 1.018 1.008 1.016 1.026   UBLD Trace* Negative Negative Small*   URINEPH 5.0 7.5* 7.0 5.5   PROTEIN 30* 30* 100* 30*   NITRITE Negative Negative Negative Negative   LEUKEST Large* Negative Trace* Negative   RBCU 25* <1 10* 4*   WBCU 115* 3 19* 5     Recent Labs   Lab Test 08/07/19  1512   [facility] 2.47*     PTH  No lab results found.  IRON STUDIES  Recent Labs   Lab Test 12/13/18  1033 08/01/18  0921 05/08/18  0709   IRON 16* 93 48   * 248 275   IRONSAT 7* 37 18   YOLA 302* 571*  --        IMAGING:  Reviewed by me  CT 10/21/19 shows ascites  Ultrasound 10/22/19 shows ascites, normal kidneys, no hydro, no obvious renal artery stenosis though difficulty visualizing all the vasculature    Yenni Gamble MD

## 2019-10-23 ENCOUNTER — APPOINTMENT (OUTPATIENT)
Dept: CARDIOLOGY | Facility: CLINIC | Age: 59
DRG: 682 | End: 2019-10-23
Attending: HOSPITALIST
Payer: COMMERCIAL

## 2019-10-23 ENCOUNTER — APPOINTMENT (OUTPATIENT)
Dept: GENERAL RADIOLOGY | Facility: CLINIC | Age: 59
DRG: 682 | End: 2019-10-23
Attending: INTERNAL MEDICINE
Payer: COMMERCIAL

## 2019-10-23 LAB
ALBUMIN SERPL-MCNC: 2.6 G/DL (ref 3.4–5)
ALP SERPL-CCNC: 174 U/L (ref 40–150)
ALT SERPL W P-5'-P-CCNC: 13 U/L (ref 0–50)
ANION GAP SERPL CALCULATED.3IONS-SCNC: 15 MMOL/L (ref 3–14)
AST SERPL W P-5'-P-CCNC: 12 U/L (ref 0–45)
B-OH-BUTYR SERPL-MCNC: 5.1 MG/DL (ref 0–3)
BILIRUB DIRECT SERPL-MCNC: <0.1 MG/DL (ref 0–0.2)
BILIRUB SERPL-MCNC: 0.3 MG/DL (ref 0.2–1.3)
BUN SERPL-MCNC: 70 MG/DL (ref 7–30)
CALCIUM SERPL-MCNC: 8 MG/DL (ref 8.5–10.1)
CHLORIDE SERPL-SCNC: 109 MMOL/L (ref 94–109)
CO2 SERPL-SCNC: 11 MMOL/L (ref 20–32)
CREAT SERPL-MCNC: 6.62 MG/DL (ref 0.52–1.04)
ERYTHROCYTE [DISTWIDTH] IN BLOOD BY AUTOMATED COUNT: 19.6 % (ref 10–15)
GFR SERPL CREATININE-BSD FRML MDRD: 6 ML/MIN/{1.73_M2}
GLUCOSE SERPL-MCNC: 139 MG/DL (ref 70–99)
HCT VFR BLD AUTO: 30.4 % (ref 35–47)
HGB BLD-MCNC: 9.1 G/DL (ref 11.7–15.7)
MAGNESIUM SERPL-MCNC: 2 MG/DL (ref 1.6–2.3)
MCH RBC QN AUTO: 27.8 PG (ref 26.5–33)
MCHC RBC AUTO-ENTMCNC: 29.9 G/DL (ref 31.5–36.5)
MCV RBC AUTO: 93 FL (ref 78–100)
PHOSPHATE SERPL-MCNC: 7.4 MG/DL (ref 2.5–4.5)
PLATELET # BLD AUTO: 140 10E9/L (ref 150–450)
POTASSIUM SERPL-SCNC: 4.8 MMOL/L (ref 3.4–5.3)
POTASSIUM SERPL-SCNC: 4.8 MMOL/L (ref 3.4–5.3)
PROT SERPL-MCNC: 6.3 G/DL (ref 6.8–8.8)
RBC # BLD AUTO: 3.27 10E12/L (ref 3.8–5.2)
SODIUM SERPL-SCNC: 135 MMOL/L (ref 133–144)
TACROLIMUS BLD-MCNC: 12.4 UG/L (ref 5–15)
TME LAST DOSE: NORMAL H
WBC # BLD AUTO: 5.9 10E9/L (ref 4–11)

## 2019-10-23 PROCEDURE — 25000128 H RX IP 250 OP 636: Performed by: HOSPITALIST

## 2019-10-23 PROCEDURE — 99232 SBSQ HOSP IP/OBS MODERATE 35: CPT | Performed by: HOSPITALIST

## 2019-10-23 PROCEDURE — 25000131 ZZH RX MED GY IP 250 OP 636 PS 637: Performed by: PHYSICIAN ASSISTANT

## 2019-10-23 PROCEDURE — 36415 COLL VENOUS BLD VENIPUNCTURE: CPT | Performed by: PHYSICIAN ASSISTANT

## 2019-10-23 PROCEDURE — 80048 BASIC METABOLIC PNL TOTAL CA: CPT | Performed by: HOSPITALIST

## 2019-10-23 PROCEDURE — 25000132 ZZH RX MED GY IP 250 OP 250 PS 637: Performed by: HOSPITALIST

## 2019-10-23 PROCEDURE — 71046 X-RAY EXAM CHEST 2 VIEWS: CPT

## 2019-10-23 PROCEDURE — 84132 ASSAY OF SERUM POTASSIUM: CPT | Performed by: PHYSICIAN ASSISTANT

## 2019-10-23 PROCEDURE — 83735 ASSAY OF MAGNESIUM: CPT | Performed by: HOSPITALIST

## 2019-10-23 PROCEDURE — 36415 COLL VENOUS BLD VENIPUNCTURE: CPT | Performed by: HOSPITALIST

## 2019-10-23 PROCEDURE — 84100 ASSAY OF PHOSPHORUS: CPT | Performed by: HOSPITALIST

## 2019-10-23 PROCEDURE — 85027 COMPLETE CBC AUTOMATED: CPT | Performed by: PHYSICIAN ASSISTANT

## 2019-10-23 PROCEDURE — 25000132 ZZH RX MED GY IP 250 OP 250 PS 637: Performed by: PHYSICIAN ASSISTANT

## 2019-10-23 PROCEDURE — 12000001 ZZH R&B MED SURG/OB UMMC

## 2019-10-23 PROCEDURE — 80053 COMPREHEN METABOLIC PANEL: CPT | Performed by: PHYSICIAN ASSISTANT

## 2019-10-23 PROCEDURE — 80076 HEPATIC FUNCTION PANEL: CPT | Performed by: HOSPITALIST

## 2019-10-23 PROCEDURE — 93306 TTE W/DOPPLER COMPLETE: CPT

## 2019-10-23 PROCEDURE — 93306 TTE W/DOPPLER COMPLETE: CPT | Mod: 26 | Performed by: INTERNAL MEDICINE

## 2019-10-23 PROCEDURE — 80197 ASSAY OF TACROLIMUS: CPT | Performed by: HOSPITALIST

## 2019-10-23 PROCEDURE — 25000128 H RX IP 250 OP 636: Performed by: PHYSICIAN ASSISTANT

## 2019-10-23 RX ORDER — BUMETANIDE 0.25 MG/ML
5 INJECTION INTRAMUSCULAR; INTRAVENOUS ONCE
Status: COMPLETED | OUTPATIENT
Start: 2019-10-23 | End: 2019-10-23

## 2019-10-23 RX ORDER — LANOLIN ALCOHOL/MO/W.PET/CERES
3 CREAM (GRAM) TOPICAL AT BEDTIME
Status: DISCONTINUED | OUTPATIENT
Start: 2019-10-23 | End: 2019-10-29 | Stop reason: HOSPADM

## 2019-10-23 RX ORDER — ACETAMINOPHEN 325 MG/1
325 TABLET ORAL ONCE
Status: COMPLETED | OUTPATIENT
Start: 2019-10-23 | End: 2019-10-23

## 2019-10-23 RX ORDER — ACETAMINOPHEN 500 MG
500 TABLET ORAL EVERY 4 HOURS PRN
Status: DISCONTINUED | OUTPATIENT
Start: 2019-10-23 | End: 2019-10-29 | Stop reason: HOSPADM

## 2019-10-23 RX ORDER — HEPARIN SODIUM 5000 [USP'U]/.5ML
5000 INJECTION, SOLUTION INTRAVENOUS; SUBCUTANEOUS EVERY 12 HOURS
Status: DISCONTINUED | OUTPATIENT
Start: 2019-10-24 | End: 2019-10-24

## 2019-10-23 RX ORDER — SODIUM BICARBONATE 650 MG/1
1300 TABLET ORAL 3 TIMES DAILY
Status: DISCONTINUED | OUTPATIENT
Start: 2019-10-23 | End: 2019-10-23

## 2019-10-23 RX ADMIN — MULTIPLE VITAMINS W/ MINERALS TAB 1 TABLET: TAB at 08:32

## 2019-10-23 RX ADMIN — FERROUS SULFATE TAB 325 MG (65 MG ELEMENTAL FE) 325 MG: 325 (65 FE) TAB at 08:32

## 2019-10-23 RX ADMIN — ONDANSETRON 4 MG: 2 INJECTION INTRAMUSCULAR; INTRAVENOUS at 08:24

## 2019-10-23 RX ADMIN — PREDNISONE 2.5 MG: 2.5 TABLET ORAL at 19:20

## 2019-10-23 RX ADMIN — PREDNISONE 5 MG: 5 TABLET ORAL at 08:32

## 2019-10-23 RX ADMIN — ACETAMINOPHEN 325 MG: 325 TABLET, FILM COATED ORAL at 03:57

## 2019-10-23 RX ADMIN — VORICONAZOLE 200 MG: 200 TABLET, FILM COATED ORAL at 19:20

## 2019-10-23 RX ADMIN — BUMETANIDE 5 MG: 0.25 INJECTION INTRAMUSCULAR; INTRAVENOUS at 13:07

## 2019-10-23 RX ADMIN — CEFTRIAXONE 1 G: 1 INJECTION, POWDER, FOR SOLUTION INTRAMUSCULAR; INTRAVENOUS at 04:29

## 2019-10-23 RX ADMIN — MELATONIN TAB 3 MG 3 MG: 3 TAB at 21:56

## 2019-10-23 RX ADMIN — PANTOPRAZOLE SODIUM 40 MG: 40 TABLET, DELAYED RELEASE ORAL at 08:33

## 2019-10-23 RX ADMIN — SODIUM BICARBONATE 650 MG TABLET 1300 MG: at 10:25

## 2019-10-23 RX ADMIN — ACETAMINOPHEN 500 MG: 500 TABLET, FILM COATED ORAL at 21:56

## 2019-10-23 RX ADMIN — Medication 1 TABLET: at 08:32

## 2019-10-23 RX ADMIN — PANTOPRAZOLE SODIUM 40 MG: 40 TABLET, DELAYED RELEASE ORAL at 19:20

## 2019-10-23 RX ADMIN — VORICONAZOLE 200 MG: 200 TABLET, FILM COATED ORAL at 08:32

## 2019-10-23 ASSESSMENT — ENCOUNTER SYMPTOMS
LIGHT-HEADEDNESS: 0
FATIGUE: 1
PALPITATIONS: 0
ABDOMINAL PAIN: 0
FEVER: 0
CONSTIPATION: 0
SEIZURES: 0
DIARRHEA: 0
DYSURIA: 0
NECK STIFFNESS: 0
NUMBNESS: 0
WEAKNESS: 1
DIAPHORESIS: 0
NAUSEA: 0
COLOR CHANGE: 0
CHILLS: 1
COUGH: 0
VOMITING: 0
NECK PAIN: 0
ABDOMINAL DISTENTION: 1
BLOOD IN STOOL: 0
FREQUENCY: 0
HEMATURIA: 0
SHORTNESS OF BREATH: 0

## 2019-10-23 ASSESSMENT — ACTIVITIES OF DAILY LIVING (ADL)
ADLS_ACUITY_SCORE: 11

## 2019-10-23 ASSESSMENT — MIFFLIN-ST. JEOR: SCORE: 1200.51

## 2019-10-23 NOTE — PROGRESS NOTES
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      Oliguric RADHA on CKD stage IV  Has had an elevated Cr of 1.2-1.4 since 6/2018. Had seen Nephrology Dr. Gamble on 8/7/19 for elevated creatinine to 1.8. Renal biopsy 8/2019 with arterionephrosclerosis and features concerning for calcineurin toxicity (tacrolimus). Of note, patient was started on Voriconazole during recent admission 10/6 for empyema with cx + aspergillus. Discussed with pharmacy today who discussed Voriconazole has a significant risk interaction for increasing tacrolimus levels. Of note, when voriconazole was initiated, Tacrolimus level was decreased. Recent Tacro Trough on 10/17 was 27, however not a true trough as patient had taken her AM tacrolimus dose at that time. Pt presents with elevated Creatinine despite 3 day OP IV fluid bolus challenge (10/18-20). Reports hx of decreased UOP over the past few days. Denies any dysuria, frequency or urgency. No flank pain. CT scan on admission unremarkable for hydronephrosis or obstructing stone. Does report decreased PO intake and fluid intake due to abdominal bloating, and CT w/ new onset ascites. DDx for RADHA includes medication induced including tacrolimus toxicity in setting of voriconazole, interstitial nephritis from bactrim (however, this has been chronic medication), UTI vs pre-renal 2/2 poor oral intake. No s/sx of obstruction. Also has a history of worsening HTN over the past 6 months, and was recently started  on Imdur, carvedilol, in addition to PTA Amlodipine, therefore, could also be 2/2 underlying renal artery stenosis. Bladder scan unremarkable for retention. Given 1L bolus of NS in ED.  - Nephrology, consulted appreciate recommendations  - UA/UCx  - CK WNL (added given dermatomyositis)  - FeNA  - Renal US w/ doppler to assess for FRANCHESCA  - Monitor BP   - No more IVF. If she is dyspneic overnight, can get Bumex   - Trend BMP in AM   - Start sodium bicarbonate 1300mg BID per Neph recommendations   - Hold Tacrolimus and Bactrim per Pulmonology recs for now (see below).  - Avoid nephrotoxins and renally dose medications      Metabolic acidosis:   PH 7.25, CO2 29, PO2 35, HCO3 13. No lactic acidosis. No e/o DKA. Glu 138. Likely secondary to RADHA.  Repeat CO2 from BMP 10. No e/o increased work of breathing. AG 13 which is upper limits of normal, however, Nephrology discussed this could be falsely low due to low albumin.   - Add Beta-hydroxybutyrate per neph to assess for starvation ketosis.   - Start sodium bicarbonate 1300mg BID PO per Nephrology recommendations; no indication for bicarb gtt at this time  - Monitor BMP in AM      New onset ascites:   Pt reports 12lb weight gain in 3 days with CT showing e/o moderate ascites/anasarca increased since 9/12/19 and 10/1/19. No hx of cirrhosis. CT w/ mild hepatosplenomegaly. Reports abdominal bloating, early satiety and decreased appetite. No abdominal pain or tenderness, fever or leukocytosis. Do not suspect SBP. DDx includes portal HTN vs other cause which cannot r/o malignancy.   - Diag para+ 2L therpuetic para done on 10/22. No evidence of SBP  - Abdominal US w/ doppler      Hx of Bilateral Lung transplant for ILD:   Transplanted in 3/2018; course c/b R main bronchial stenosis requiring repeated dilatation, + DSAs, CMV viremia s/p valganciclovir with undetectable viral loads off therapy. Not on PTA O2 use. On PTA prednisone 5mg qAM and 2.5mg qPM and tacrolimus 2.5mg qAM and  2.0mg qPM and Bactrim 400-80mg daily for prophylaxis. Currently on room air.   - D/W Dr. Macias, Pulmonology.   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 CT guided bx 10/8 with purulent fluid positive for Aspergillus fumigatus in cultures. ID was consulted during that admission and patient was started on Voriconazole and is continued on Vori 200mg BID. CT surgery was consulted and discussed significant risk of morbidity w/ surgery and recommended tx w/ antifungal and serial CT scan for f/u.  - Continue Voriconazole 200mg BID for now       Hx of low level CMV viremia:   Peak viral load >3M in 8/2018, was on ganciclovir until 7/25/19. Currently biweekly monitoring of low level CMV viremia. Not detected 10/6/19.   - Transplant Pulmonology consulted      HTN:   On PTA Amlodipine 10mg daily, Carvedilol 25mg BID, and Imdur 120mg daily. Pt reports BP had been increasing and labile over the past 6 months. Was started on Carvedilol, Imdur during previous hospitalization.   - Stop all BP medications to allow higher BPs to help blood flow to kidneys  - Renal US with doppler reviewed, no hydronephrosis     Gallbladder wall thickening with cholelithiasis  Mild wall thickening of cecus, ascending colon, and splenic flexture:   CT w/ Gallbladder wall thickening w/ cholelithasis. Findings may represent reactive changes to ascites. Acute cholecystitis cannot be ruled out.  This has been evidence on previous imaging studies.  MRI abd 9/12 w/o choledocholithaisis or obstructing  mass. No RUQ pain. No WBC or elevated total bili, ALT or AST. Do not have concern for cholecystitis at this time. CT also with mild wall thickening involving cecus, ascending colon, and splenic flexture could be reactive; vs mild colitis is also on ddx per Radiology. On exam, abdominal exam is benign.   - Monitor  - Please page medicine with any increasing abdominal pain, N/V.      Dermatomyositis  Seronegative RA:   No active treatments. Has had increasing bilateral knee pain which has been assessed with OP MRI. Follows with Rheumatology as OP.      Anemia  Thrombocytopenia:   Hgb 9.4 on admission. BL appears 8-9. Plt 134 on admission. Newly thrombocytopenic since 10/6.   - Trend CBC w diff In AM   - Continue ferrous sulfate daily       Diet: Renal Diet (non-dialysis)    DVT Prophylaxis: Heparin SQ  Basilio Catheter: not present  Code Status: Full Code      Disposition Plan   Expected discharge: 2 - 3 days, recommended to prior living arrangement once adequate pain management/ tolerating PO medications.  Entered: Chris Navarro MD 10/22/2019, 11:47 PM       The patient's care was discussed with the Bedside Nurse.    Chris Navarro MD  Hospitalist Service, [address]  Please see sticky note for cross cover information  ______________________________________________________________________    Interval History   Patient reports discomfort with her abdomen being swollen, this is also making it difficult for her to breathe  She has no other complaints, no CP, HA, NVD, Abd pain, dysuria, hematuria, cough or fever    Data reviewed today: I reviewed all medications, new labs and imaging results over the last 24 hours.    Physical Exam   Vital Signs: Temp: 97.4  F (36.3  C) Temp src: Oral BP: 110/66 Pulse: 75   Resp: 24 SpO2: 93 % O2 Device: None (Room air)    Weight: 148 lbs 3.2 oz  General Appearance: Alert, well appearing, and in no acute  distress  Mental Status: Oriented to person, place, and time  HEENT: No pallor or icterus. Pupils equal and reactive, extraocular eye movements intact. Mucous membranes moist, pharynx normal without lesions. Neck supple, no significant adenopathy, or thyromegaly  Chest: Clear to auscultation bilaterally, no wheezes, rales or rhonchi or crackels, symmetric air entry  Heart: Normal S1, S2. No murmurs, rubs, clicks or gallops. Normal rate, regular rhythm  Abdomen: Soft, nontender, skin with anasarca, nondistended, no masses or organomegaly, Bowel sounds heard  Neurological: Alert, oriented, normal speech, no acute focal findings  Skin and Extremities: Peripheral pulses normal, 2+ pedal edema, no clubbing or cyanosis. No rashes, no suspicious skin lesions noted

## 2019-10-23 NOTE — PLAN OF CARE
Pt A&O. VSS on RA. Pt desatted to 91% overnight and was put on 1/2 L O2 but is now on RA satting in the mid 90s . Denies SOB. Up ind. Complains of discomfort at paracentesis site, Tylenol given. Had a total of 50cc for urine output this shift. MD aware. 2 loose stools. L PIV-TKO. Will cont to monitor.

## 2019-10-23 NOTE — PROGRESS NOTES
Pulmonary Medicine  Cystic Fibrosis - Lung Transplant Team  Progress Note  2019       Patient: Kecia Blue  MRN: [id number]  : 1960 (age 58 year old)  Transplant: 3/1/2018 (Lung), POD#601  Admission date: 10/21/2019    Assessment & Plan:     Kecia Blue is a 58-year-old female with PMH of BSLT (2018) for ILD with anti-synthetase syndrome and dermatomyositis with post-op course complicated by Aspergillus empyema, Stenotrophomonas airway infeciton, right mainstem bronchial stenosis, CMV reactivation, positive DSA, C diff, HTN, and CKD.  She was admitted 10/21 with abdominal bloating and increased shortness of breath, s/p paracentesis for 2L fluid removal with rapid reaccumulation.      S/p bilateral sequential lung transplant (BSLT): Reports increased dyspnea, likely due to pressure on her diaphragm from the recurrent ascites and also possibly from metabolic acidosis from renal dysfunction. No hypoxia, nonproductive congested cough.  CT scan (10/21) with small bilateral pleural effusions)  - CXR today with increase in bilateral pleural effusions R>L as compared with 10/6 (personally reviewed)  - Volume and renal management per primary and nephrology teams    Immunosuppression: On 2-drug IST for recurrent CMV and leukpenia  - Tacrolimus dose held on admission for supratherapeutic level (PTA 2.5/2). Goal 8-10.  Level today 12.4, continue to hold and monitor daily levels.  Will likely resume tomorrow at 1 mg BID after reviewing morning level.  - Prednisone 5 mg qAM / 2.5 mg qPM     CMV reactivation: CMV D+/R+. First positive level 18, bronch CMV as high as 3.1 million (18).  Valgancyclovir therapy stopped .  CMV level has been transiently elevated, but generally has remained low and <137 since .  - CMV weekly, next due tomorrow (ordered)     Aspergillus empyema: Aspergillus noted in pleural space 10/8.  Has been on voriconazole course since 10/10, LFTs generally  stable save for low level alk phos.  F/u EKG after 1 week with QTc 469.  - Continue voriconazole 200 mg BID, course TBD (minimum 3 months)  - Voriconazole level tomorrow (ordered)  - Recommend repeat chest CT this admission    Other problems managed by the primary team:    Ascites: Abdominal CT (10/21) with moderate ascites/anasarca significantly increased from 9/12 and 10/1, gallbladder wall thickening with unchanged cholelithiasis soft tissue density in the medial left costophrenic angle, mild hepatosplenomegaly, and with mild wall thickening in the cecum, ascending colon and splenic flexure.  Abdominal US (10/22) with coarse hepatic echotexture, moderate ascites patent hepatic and portal vessels circumferential gallbladder wall thickening possible sludge versus gallbladder polyp.  Alkaline phosphatase is mildly elevated, remaining liver panel normal.  Ascites may be due to renal failure (see below) but the CT reading suggests there may have been some ascites on previous September and October imaging.  Need to consider underlying liver disease.  - Management per primary team  - Consider hepatology consultation     Acute on chronic kidney disease: The patient had stage III CKD but appears to have RADHA on admission, possibly d/t supratherapeutic tacrolimus level on admission (31.6, true trough).  It has probably been gradually increasing since the addition of voriconazole at her last visit.  Prior tacrolimus level was 5.3 on 10/10 and she was already developing ascites by 10/13, which seems a little too fast for ascites due to renal failure due to tacrolimus.  Although this is the most likely cause, a broader differential should be maintained.   - Management per nephrology and primary teams    We appreciate the excellent care provided by the [place] [id number] team. Recommendations communicated via in person rounding and this note. Will continue to follow along closely, please do not hesitate to call with any questions  "or concerns.    Patient discussed with Dr. Macias.    Hina Huff, DNP, APRN, CNP  Inpatient Nurse Practitioner  Pulmonary CF/Transplant  Pager [phone]     Subjective & Interval History:     Remains on RA with very slight improvement in [place].  Slight increase in upper airway congestion/wheeze and cough.  Occasional nausea with early satiety, stools stable.  Abdomen feels more firm today after recent tap for 2L yesterday.    Review of Systems:     C: no fever, no chills  INTEGUMENTARY/SKIN: no rash or obvious new lesions  ENT/MOUTH: no sore throat, no sinus pain, no nasal congestion or drainage  RESP: see interval history  CV: no chest pain, no palpitations, no peripheral edema  GI: no reflux symptoms  : + oliguria  MUSCULOSKELETAL: no myalgias, no arthralgias  ENDOCRINE: blood sugars with adequate control  NEURO: no headache, no numbness or tingling  PSYCHIATRIC: mood stable    Physical Exam:     Vital signs:  Temp: 96.7  F (35.9  C) Temp src: Oral BP: 123/76 Pulse: 82   Resp: 16 SpO2: 97 % O2 Device: None (Room air) Oxygen Delivery: 1/2 LPM Height: 162.6 cm (5' 4\") Weight: 62.6 kg (137 lb 14.4 oz)  I/O:     Intake/Output Summary (Last 24 hours) at 10/23/2019 1344  Last data filed at 10/23/2019 1325  Gross per 24 hour   Intake 145 ml   Output 65 ml   Net 80 ml     Constitutional: Sitting up in bed, in no apparent distress.   HEENT: Eyes with pink conjunctivae, anicteric. Oral mucosa moist without lesions.    PULM: Diminished RML. Coarse crackles LLL, no rhonchi, no wheezes. Non-labored breathing on RA.  CV: Normal S1 and S2. RRR. No murmur, gallop, or rub. Trace peripheral edema.   ABD: NABS, firm and distended, nontender.    MSK: Moves all extremities. No apparent muscle wasting.   NEURO: Alert and oriented, conversant.   SKIN: Warm, dry. No rash on limited exam.   PSYCH: Mood stable.    Lines, Drains, and Devices:  Peripheral IV 10/21/19 Left Lower forearm (Active)   Site Assessment WDL 10/22/2019  8:00 PM "   Line Status Saline locked 10/22/2019  8:00 PM   Phlebitis Scale 0-->no symptoms 10/22/2019  8:00 PM   Infiltration Scale 0 10/22/2019  8:00 PM   Infiltration Site Treatment Method  None 10/22/2019  8:00 PM   Extravasation? No 10/22/2019  8:00 PM   Number of days: 2     Data:     LABS    CMP:   Recent Labs   Lab 10/23/19  0322 10/22/19  1314 10/21/19  1752 10/21/19  1451    133 133 132*   POTASSIUM 4.8 4.5 5.0 4.5   CHLORIDE 109 109 109 107   CO2 11* 13* 10* 14*   ANIONGAP 15* 12 13 12   * 127* 138* 111*   BUN 70* 66* 66* 67*   CR 6.62* 6.18* 5.76* 5.90*   GFRESTIMATED 6* 7* 8* 7*   GFRESTBLACK 7* 8* 9* 8*   CHELSEY 8.0* 8.2* 8.0* 8.5   MAG 2.0 2.1 2.0  --    PHOS 7.4* 7.3* 6.7*  --    PROTTOTAL 6.3* 6.7*  --  7.2   ALBUMIN 2.6* 2.7*  --  3.0*   BILITOTAL 0.3 0.4  --  0.3   ALKPHOS 174* 181*  --  194*   AST 12 19  --  18   ALT 13 18  --  16     CBC:   Recent Labs   Lab 10/23/19  0322 10/22/19  1314 10/21/19  1451   WBC 5.9 5.3 6.3   RBC 3.27* 3.18* 3.34*   HGB 9.1* 9.0* 9.4*   HCT 30.4* 29.7* 31.4*   MCV 93 93 94   MCH 27.8 28.3 28.1   MCHC 29.9* 30.3* 29.9*   RDW 19.6* 19.5* 19.4*   * 142* 134*       INR:   Recent Labs   Lab 10/22/19  1314   INR 1.12       Glucose:   Recent Labs   Lab 10/23/19  0322 10/22/19  1314 10/21/19  1752 10/21/19  1451   * 127* 138* 111*       Blood Gas:   Recent Labs   Lab 10/21/19  1459   PHV 7.25*   PCO2V 29*   PO2V 35   HCO3V 13*       Culture Data   Recent Labs   Lab 10/22/19  1430   CULT Culture negative monitoring continues       Virology Data:   Lab Results   Component Value Date    FLUAH1 Negative 10/07/2019    FLUAH3 Negative 10/07/2019    ST4064 Negative 10/07/2019    IFLUB Negative 10/07/2019    RSVA Negative 10/07/2019    RSVB Negative 10/07/2019    PIV1 Negative 10/07/2019    PIV2 Negative 10/07/2019    PIV3 Negative 10/07/2019    HMPV Negative 10/07/2019    HRVS Negative 10/07/2019    ADVBE Negative 10/07/2019    ADVC Negative 10/07/2019    ADVC  Negative 03/07/2019    ADVC Negative 01/17/2019       Historical CMV results (last 3 of prior testing):  Lab Results   Component Value Date    CMVQNT CMV DNA Not Detected 10/06/2019    CMVQNT <137 (A) 09/12/2019    CMVQNT <137 (A) 06/05/2019     Lab Results   Component Value Date    CMVLOG Not Calculated 10/06/2019    CMVLOG <2.1 09/12/2019    CMVLOG <2.1 06/05/2019       Urine Studies    Recent Labs   Lab Test 10/21/19  2240 09/12/19  0125   URINEPH 5.0 7.5*   NITRITE Negative Negative   LEUKEST Large* Negative   WBCU 115* 3       Most Recent [place] Pulmonary Function Testing (FVC/FEV1 only)  FVC-Pre   Date Value Ref Range Status   08/07/2019 2.22 L    06/05/2019 2.26 L    03/05/2019 1.97 L    01/16/2019 1.92 L      FVC-%Pred-Pre   Date Value Ref Range Status   08/07/2019 67 %    06/05/2019 70 %    03/05/2019 60 %    01/16/2019 59 %      FEV1-Pre   Date Value Ref Range Status   08/07/2019 1.60 L    06/05/2019 1.65 L    03/05/2019 1.31 L    01/16/2019 1.04 L      FEV1-%Pred-Pre   Date Value Ref Range Status   08/07/2019 61 %    06/05/2019 64 %    03/05/2019 51 %    01/16/2019 40 %        IMAGING    Recent Results (from the past 48 hour(s))   CT Abdomen Pelvis w/o Contrast   Result Value    Radiologist flags Gallbladder    Narrative    EXAMINATION: CT abdomen and pelvis without contrast on 10/21/2019.    INDICATION: Abdominal bloating, acute kidney injury???evaluate for  hydronephrosis. History of rheumatoid arthritis, interstitial lung  disease, dermatomyositis, Raynaud's disease, pulmonary hypertension  and antisynthetase syndrome. Bilateral lung transplant 3/1/2018.  CT  guided left fluid collection aspiration positive for aspergillus    COMPARISON: MR dated 9/12/2019. CT dated 8/20/2018.  Outside CT  10/1/2019    TECHNIQUE: Routine images from the level of the diaphragm through the  pelvis were obtained without contrast.   Total DLP: 840 mGy*cm.    FINDINGS: Limited evaluation of the abdomen and pelvis due  to  noncontrast examination.    Lines and tubes: None.    Lower thorax: Cardiomegaly. Trace pericardial effusion. Small  bilateral pleural effusions. Bibasilar fibroatelectasis.  Rounded soft  tissue density structure in the medial left costophrenic angle is not  substantially changed measuring about 3.8 x 2.5 cm (series 5, image  62) with unchanged mild cortical irregularity of the left 10th rib.     Liver: Hepatomegaly. No focal lesions seen on this noncontrast study.    Gallbladder and bile ducts: Equivocal gallbladder wall thickening is  nonspecific in the setting of ascites. Cholelithiasis.    Pancreas: Unremarkable noncontrast appearance.    Spleen: Measures 13.5 cm in the craniocaudal axis.     Kidneys and ureters: No hydronephrosis or stones.     Adrenal glands: No nodules.    Stomach and bowel: No definite small or large bowel dilation.  Mild  wall thickening involving the cecum and ascending colon and to a  lesser extent splenic flexure could be reactive in the setting of  ascites.  Visualized appendix is unremarkable.    Pelvic organs: Decompressed urinary bladder. Pelvic phleboliths.  Probable uterine fibroid without significant change.    Peritoneum/retroperitoneum: Moderate ascites. No intra-abdominal free  air.    Vessels: No abdominal aortic aneurysm. Scattered atherosclerotic  calcifications of the aorta and branching vessels.    Lymph nodes: No definite abnormally enlarged lymph nodes are seen on  this noncontrast study.  Few prominent nonspecific retroperitoneal  lymph nodes.  Symmetric prominent inguinal lymph nodes.    Bones and soft tissues: No acute osseous abnormalities. Diffuse soft  tissue edema.  Sternotomy.      Impression    IMPRESSION:  1. Moderate ascites/anasarca significantly increased since 9/12/2019  and 10/1/2019.  2. Gallbladder wall thickening with cholelithiasis. Findings may  represent reactive changes to ascites. However acute cholecystitis  cannot be ruled out. Consider  ultrasound for further evaluation as  clinically indicated.  3. Postoperative changes bilateral lung transplantation. Rounded soft  tissue density structure, presumably known empyema, in the medial left  costophrenic angle is not substantially changed with unchanged mild  cortical irregularity of the left 10th rib.  Attention on follow-up is  advised.  4. Mild hepatosplenomegaly.  5. Small bilateral pleural effusions.  6. No hydronephrosis or renal stones in either kidney.  7.  Mild wall thickening involving the cecum, ascending colon and  splenic flexure could be reactive.  Alternatively mild colitis is a  consideration in the appropriate clinical context.    [Consider Follow Up: Gallbladder]    This report will be copied to the [facility] to ensure a  provider acknowledges the finding.                I have personally reviewed the examination and initial interpretation  and I agree with the findings.    KEYLA DACOSTA MD   POC US Guide for Paracentesis    Narrative    Moderate ascites in RLQ, accessible for bedside paracentesis.   US Abdomen Limited w Doppler Complete    Narrative    EXAMINATION: US ABDOMEN LIMITED WITH DOPPLER COMPLETE, 10/22/2019  10:09 AM     COMPARISON: MRI abdomen 9/12/2019; ultrasound abdomen 9/11/2019    HISTORY: Evaluate for portal hypertension or thrombus; new onset  ascites    TECHNIQUE: The abdomen was scanned in standard fashion with  specialized ultrasound transducer(s) using both grey scale, color  Doppler, and spectral flow techniques.    Findings:    Liver: The liver demonstrates coarse echotexture. No evidence of a  focal hepatic mass or intrahepatic biliary ductal dilatation.     Extrahepatic portal vein flow is antegrade, measuring 19 cm/sec.  Left portal vein flow is antegrade, measuring 13 cm/sec.  Right portal vein flow is antegrade, measuring 9 cm/sec.    Flow in the hepatic artery is towards the liver and:  38 cm/sec peak systolic  0.87 resistive index.   Left  hepatic artery is not visualized.    Splenic vein is not visualized. The left, middle, and right hepatic  veins are patent with flow towards the IVC. The IVC is not  well-visualized.     Gallbladder: Diffuse thickening of the gallbladder wall measuring up  to 13 mm. Punctate echogenic intraluminal foci with no shadowing may  represent sludge ball versus polyp (image 24 & 27). No stones are  detected.     Bile Ducts: Both the intra- and extrahepatic biliary system are of  normal caliber.  The common bile duct measures 6 mm in diameter.    Pancreas: Pancreas obscured by bowel gas.    Kidneys: Atrophic right kidney measuring 8.4 cm, detailed description  in renal duplex ultrasound done on same date.    Fluid: Ascites.      Impression    Impression:  1. Coarse hepatic echotexture, which may be seen with intrinsic  parenchymal liver disease.  2. Moderate ascites.  3. Patent visualized hepatic and portal vessels.  4. Nonspecific circumferential gallbladder wall thickening which may  be seen with ascites and systemic/hepatic disease.  5. Punctate nonmobile, non shadowing echogenic foci in the  gallbladder, may represent adherent sludge ball versus polyp.    I have personally reviewed the examination and initial interpretation  and I agree with the findings.    JAMES LAI MD   US Renal Complete w Duplex Complete    Narrative    Exam: Duplex Doppler ultrasound of the kidneys and bilateral renal  arteries dated 10/22/2019 10:09 AM    Comparison Study: MRI abdomen 9/12/2019    Clinical Information: Evaluate for renal artery stenosis, history of  acute kidney injury with uncontrolled hypertension    Technique: B-mode (grayscale) and duplex Doppler evaluation of the  abdominal aorta and renal arteries performed. Velocity measurements  obtained with angle correction at or less than 60 degrees.       Findings:    The right kidney measures 8.4 cm in length. The left kidney measures  9.2 cm in length. Cortical thickness and  echogenicity is normal. No  evidence of stones, masses or hydronephrosis.    Peak systolic velocities in the abdominal aorta are:    Supine and infrarenal aorta is not visualized.     Right renal artery velocities:    Origin: Not visualized  Mid renal artery: Not visualized   Hilum/distal renal artery: 30 cm/sec    Resistive indices in the arcuate arteries:     Not visualized    Patent right renal vein.    Left renal artery velocities:    Origin: Not visualized   Mid renal artery: 41 cm/sec  Hilum/distal renal artery: 48 cm/sec    Resistive indices in the arcuate arteries:     Inferior pole: Not visualized   Mid pole: 0.7  Superior pole: Not visualized     Patent left renal vein.      Impression    Impression:    1. Right kidney:    1a. Renal parenchyma: Atrophic with no hydronephrosis or shadowing  calculus  1b. Renal artery: Limited evaluation with nonvisualization of  intrarenal arcuate arteries and proximal right renal artery; normal  velocity and waveform at the right renal hilum    2. Left kidney:    2a. Renal parenchyma: No hydronephrosis or shadowing calculus  2b. Renal artery: Limited evaluation with nonvisualization of superior  and inferior arcuate arteries and proximal left renal artery; normal  velocity and waveforms in the mid renal artery, left renal hilum.    I have personally reviewed the examination and initial interpretation  and I agree with the findings.    AJMES LAI MD   XR Chest 2 Views    Narrative    Exam: XR CHEST 2 VW, 10/23/2019 9:43 AM    Indication: lung transplant with dyspnea    Comparison: 10/6/2019    Findings:   Increasing right pleural effusion. There is associated atelectasis.  Linear atelectasis at the left lung base. Mild pulmonary venous  congestion. Heart within normal limits. Low lung volume.      Impression    Impression:   1. Low lung volumes versus mild pulmonary venous congestion.  2. Increasing right pleural effusion with associated atelectasis.  Consider  pneumonia clinically.  3. Increasing left lower lobe atelectasis.    ANNE MANZANO MD

## 2019-10-23 NOTE — PROGRESS NOTES
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  Anruic RADHA likely related to FK level of 30, less likely AIN or GN (UA shows WBC/RBC and 30 albumin with SG 1.018).    - no improvement in kidney function  - tacrolimus improved to 12.4 today  - no indication for dialysis but if she remains anuric an indication is likely to arise  - continue supportive cares     2. Hypervolemia - anuric, not on O2, can try bumex 5 mg IV and assess response     3. Acidosis - likely due to RADHA, discontinue sodium bicarbonate as it might me contributing to bloating  - OK for protein shakes    4. Hyperphosphatemia - due to RADHA, with phos of 7.4 in RADHA a binder is not indicated    5. Nutrition - OK if dietician wants to order protein shakes since Kecia is having trouble eating    Yenni Gamble MD   [phone]    Interval History :   Nursing and provider notes from last 24 hours reviewed.  In the last 24 hours Kecia Blue had 2 liter paracentesis and has improvement in breathing.  Since para she has worsening of abdominal distension.  She still has poor appetite.  She remains anuric.     Review of Systems:   I reviewed the following systems:  GI: poor appetite. no nausea or vomiting, + diarrhea.   Neuro:  no confusion  Constitutional:  no fever or chills  CV: improving dyspnea but has ongoing edema.  no chest pain.    Physical Exam:   I/O last 3 completed shifts:  In: 3 [I.V.:3]  Out: 50 [Urine:50]   /76 (BP Location: Right arm)   Pulse 82   Temp 97.3  F (36.3  C) (Oral)   Resp 16   Ht 1.626 m (5' 4\")   Wt 62.6 kg (137 lb 14.4 oz)   " LMP 06/07/2014 (Exact Date)   SpO2 97%   BMI 23.67 kg/m       GENERAL APPEARANCE: no distress  EYES:  no scleral icterus, pupils equal  HENT: mouth without ulcers or lesions  PULM: lungs clear to auscultation bilaterally, equal air movement, no clubbing  CV: regular rhythm, normal rate, no rub     -JVD note elevated     -edema 2+ at sacrum   GI: distended      Labs:   All labs reviewed by me  Electrolytes/Renal -   Recent Labs   Lab Test 10/23/19  0322 10/22/19  1314 10/21/19  1752    133 133   POTASSIUM 4.8 4.5 5.0   CHLORIDE 109 109 109   CO2 11* 13* 10*   BUN 70* 66* 66*   CR 6.62* 6.18* 5.76*   * 127* 138*   CHELSEY 8.0* 8.2* 8.0*   MAG 2.0 2.1 2.0   PHOS 7.4* 7.3* 6.7*       CBC -   Recent Labs   Lab Test 10/23/19  0322 10/22/19  1314 10/21/19  1451   WBC 5.9 5.3 6.3   HGB 9.1* 9.0* 9.4*   * 142* 134*       LFTs -   Recent Labs   Lab Test 10/23/19  0322 10/22/19  1314 10/21/19  1451   ALKPHOS 174* 181* 194*   BILITOTAL 0.3 0.4 0.3   ALT 13 18 16   AST 12 19 18   PROTTOTAL 6.3* 6.7* 7.2   ALBUMIN 2.6* 2.7* 3.0*       Iron Panel -   Recent Labs   Lab Test 12/13/18  1033 08/01/18  0921  05/08/18  0709   IRON 16* 93  --  48   IRONSAT 7* 37  --  18   YOLA 302* 571*   < >  --     < > = values in this interval not displayed.         Imaging:  All imaging studies reviewed by me.       Current Medications:    calcium carbonate 600 mg-vitamin D 400 units  1 tablet Oral Daily     cefTRIAXone  1 g Intravenous Q24H     ferrous sulfate  325 mg Oral Daily with breakfast     multivitamin w/minerals  1 tablet Oral Daily     pantoprazole  40 mg Oral BID     predniSONE  5 mg Oral QAM    And     predniSONE  2.5 mg Oral QPM     sodium chloride (PF)  3 mL Intracatheter Q8H     voriconazole  200 mg Oral Q12H       Yenni Aleida Elfering, MD

## 2019-10-23 NOTE — PLAN OF CARE
"/76 (BP Location: Right arm)   Pulse 82   Temp 97.3  F (36.3  C) (Oral)   Resp 16   Ht 1.626 m (5' 4\")   Wt 62.6 kg (137 lb 14.4 oz)   LMP 06/07/2014 (Exact Date)   SpO2 97%   BMI 23.67 kg/m       Time: 9124-2844     Reason for admission: RADHA   Vitals: VSS  Activity: SBA to Independent  Pain: No complaints   Neuro: WDL  Cardiac: WDL  Respiratory: WDL  GI/: Oliguric  Diet: Renal diet, tolerating  Lines: Right PIV SL  Wounds: Right abdominal paracentesis site  Labs/imaging: Creat elevated @ 6.18. [place] level WNL.     New changes this shift: Gave one time dose of IV Bumex x1. Echo ordered but not completed today.      Plan:. Continue to monitor renal function.      Continue to monitor and follow POC   "

## 2019-10-23 NOTE — PROGRESS NOTES
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 Baseline creatinine was 1.8 in Oct 2019 visit   Recent admissions: September 2019- Admitted for left sided chest pain. Had an incidental empyema that was monitored w/o Abx  Oct 6 2019- Admitted with chest pain, aspiration showed aspergillus, dc'ed on Voriconazole     Oliguric RADHA on CKD stage IV  Has had an elevated Cr of 1.2-1.4 since 6/2018. Had seen Nephrology Dr. Gamble on 8/7/19 for elevated creatinine to 1.8. Renal biopsy 8/2019 with arterionephrosclerosis and features concerning for calcineurin toxicity (tacrolimus).   - Of note, patient was started on Voriconazole during recent admission 10/6 for empyema with cx + aspergillus. Discussed with pharmacy today who discussed Voriconazole has a significant risk interaction for increasing tacrolimus levels.  -  Recent Tacro Trough on 10/17 was 27, however not a true trough as patient had taken her AM tacrolimus dose at that time. - Pt presents with elevated Creatinine despite 3 day OP IV fluid bolus challenge (10/18-20). Reports hx of decreased UOP over the past few days.  - Denies any dysuria, frequency or urgency. No flank pain. CT scan on admission unremarkable for hydronephrosis or obstructing stone.   - Does report decreased PO intake and fluid intake due to abdominal bloating, and CT w/ new onset ascites.  - DDx for RADHA includes  -  medication induced including tacrolimus toxicity in setting of voriconazole,   - interstitial nephritis from bactrim (however, this has been chronic medication)  -  UTI-very less likely vs pre-renal 2/2 poor oral intake. No s/sx of obstruction.   - Also has a history of worsening HTN over the past 6 months, and was recently started on Imdur, carvedilol, in addition to PTA Amlodipine, therefore, it could be an additional component of relative hypotension    Plan:  - 5mg IV Bumex given on 10/23. Monitor UOP and bicarb  - Nephrology, consulted appreciate recommendations  - UA/UCx  - CK WNL (added given dermatomyositis)  - FeNA <1  - Renal US w/ doppler to assess for FRANCHESCA- no evidence of stenosis  - Discontinued all of her BP medications, will CTM BP and resume as needed. Permissive hypertension for now  - Even though she is acidotic, holding her bicarb now as the sodium load could be contributing to her ascites  - Hold Tacrolimus and Bactrim per Pulmonology recs for now (see below).  - Avoid nephrotoxins and renally dose medications   - Metabolic acidosis with CO2 of 11. Renal team aware  - ECHO reviewed. No new valvular abnormalities     New onset ascites:   Pt reports 23lb weight gain in 3 days with CT showing e/o moderate ascites/anasarca increased since 9/12/19 and 10/1/19. No hx of cirrhosis. CT w/ mild hepatosplenomegaly. Reports abdominal bloating, early satiety and decreased appetite. No abdominal pain or tenderness, fever or leukocytosis. Do not suspect SBP. DDx includes portal HTN vs other cause which cannot r/o malignancy.   - Diag para+ 2L therpuetic para done on 10/22. No evidence of SBP  - Abdominal US w/ doppler with no evidence of thrombus  - Her US Liver shows increased echogenicity of liver suggestive of liver disease. However, her US in September 2019 was normal. So, possibly, this is related to an overall volume overload state, will need repeat US once she is euvolemic  - Most likely the ascites is reflective of overall volume overload state 2/2 oliguric renal failure     Hx of Bilateral Lung transplant for ILD:   Transplanted in 3/2018; course c/b R main  bronchial stenosis requiring repeated dilatation, + DSAs, CMV viremia s/p valganciclovir with undetectable viral loads off therapy. Not on PTA O2 use. On PTA prednisone 5mg qAM and 2.5mg qPM and tacrolimus 2.5mg qAM and 2.0mg qPM and Bactrim 400-80mg daily for prophylaxis. Currently on room air.   - D/W Dr. Macias, Pulmonology.   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 CT guided bx 10/8 with purulent fluid positive for Aspergillus fumigatus in cultures. ID was consulted during that admission and patient was started on Voriconazole and is continued on Vori 200mg BID. CT surgery was consulted and discussed significant risk of morbidity w/ surgery and recommended tx w/ antifungal and serial CT scan for f/u.  - Continue Voriconazole 200mg BID for now   - Will need a f/u CT chest for her empyema. Will hold for now     Hx of low level CMV viremia:   Peak viral load >3M in 8/2018, was on ganciclovir until 7/25/19. Currently biweekly monitoring of low level CMV viremia. Not detected 10/6/19.   - Transplant Pulmonology consulted      HTN:   On PTA Amlodipine 10mg daily, Carvedilol 25mg BID, and Imdur 120mg daily. Pt reports BP had been increasing and labile over the past 6 months. Was started on Carvedilol, Imdur during previous hospitalization.   - Stop all BP medications to allow higher BPs to help blood flow to kidneys  - Renal US with doppler reviewed, no hydronephrosis     Gallbladder wall thickening  with cholelithiasis  Mild wall thickening of cecus, ascending colon, and splenic flexture:   CT w/ Gallbladder wall thickening w/ cholelithasis. Findings may represent reactive changes to ascites. Acute cholecystitis cannot be ruled out.  This has been evidence on previous imaging studies.  MRI abd 9/12 w/o choledocholithaisis or obstructing mass. No RUQ pain. No WBC or elevated total bili, ALT or AST. Do not have concern for cholecystitis at this time. CT also with mild wall thickening involving cecus, ascending colon, and splenic flexture could be reactive; vs mild colitis is also on ddx per Radiology. On exam, abdominal exam is benign.   - Monitor    Dermatomyositis  Seronegative RA:   No active treatments. Has had increasing bilateral knee pain which has been assessed with OP MRI. Follows with Rheumatology as OP.      Anemia  Thrombocytopenia:   Hgb 9.4 on admission. BL appears 8-9. Plt 134 on admission. Newly thrombocytopenic since 10/6.   - Trend CBC w diff In AM   - Continue ferrous sulfate daily       Diet: Renal Diet (non-dialysis)    DVT Prophylaxis: Pneumatic Compression Devices  Basilio Catheter: not present  Code Status: Full Code      Disposition Plan   Expected discharge: 2 - 3 days, recommended to prior living arrangement once adequate pain management/ tolerating PO medications.  Entered: Chris Navarro MD 10/23/2019, 11:02 AM       The patient's care was discussed with the Bedside Nurse.    Chris Navarro MD  Hospitalist Service, [address]  Please see sticky note for cross cover information  ______________________________________________________________________    Interval History   Patient reports her abdominal distention improved right after the para, however, this worsened overnight with feeling distended this AM.  She has otherwise no complaints. Negative for HA, DESHPANDE, NVD, dysuria, hematuria or blood in stool  - She also has  some baseline diarrhea    Data reviewed today: I reviewed all medications, new labs and imaging results over the last 24 hours.     Physical Exam   Vital Signs: Temp: 96.7  F (35.9  C) Temp src: Oral BP: 123/76 Pulse: 82   Resp: 16 SpO2: 97 % O2 Device: None (Room air) Oxygen Delivery: 1/2 LPM  Weight: 137 lbs 14.4 oz  General Appearance: Alert, well appearing, and in no acute distress  Mental Status: Oriented to person, place, and time  HEENT: No pallor or icterus. Pupils equal and reactive, extraocular eye movements intact. Mucous membranes moist, pharynx normal without lesions. Neck supple, no significant adenopathy, or thyromegaly  Chest: Clear to auscultation bilaterally, no wheezes, bilateral inspiratory crackles at the bases, symmetric air entry  Heart: Normal S1, S2. 2/6 murmur in the tricuspid region,, rubs, clicks or gallops. Normal rate, regular rhythm  Abdomen: Soft, nontender, distended, no masses or organomegaly, Bowel sounds heard  Neurological: Alert, oriented, normal speech, no acute focal findings  Skin and Extremities: Peripheral pulses normal, 2+ pedal edema, no clubbing or cyanosis. No rashes, no suspicious skin lesions noted

## 2019-10-24 ENCOUNTER — APPOINTMENT (OUTPATIENT)
Dept: GENERAL RADIOLOGY | Facility: CLINIC | Age: 59
DRG: 682 | End: 2019-10-24
Attending: PHYSICIAN ASSISTANT
Payer: COMMERCIAL

## 2019-10-24 LAB
ALBUMIN SERPL-MCNC: 2.6 G/DL (ref 3.4–5)
ALP SERPL-CCNC: 227 U/L (ref 40–150)
ALT SERPL W P-5'-P-CCNC: 13 U/L (ref 0–50)
ANION GAP SERPL CALCULATED.3IONS-SCNC: 12 MMOL/L (ref 3–14)
ANION GAP SERPL CALCULATED.3IONS-SCNC: 14 MMOL/L (ref 3–14)
AST SERPL W P-5'-P-CCNC: 21 U/L (ref 0–45)
BILIRUB SERPL-MCNC: 0.2 MG/DL (ref 0.2–1.3)
BUN SERPL-MCNC: 69 MG/DL (ref 7–30)
BUN SERPL-MCNC: 71 MG/DL (ref 7–30)
CALCIUM SERPL-MCNC: 8.2 MG/DL (ref 8.5–10.1)
CALCIUM SERPL-MCNC: 8.8 MG/DL (ref 8.5–10.1)
CHLORIDE SERPL-SCNC: 105 MMOL/L (ref 94–109)
CHLORIDE SERPL-SCNC: 110 MMOL/L (ref 94–109)
CO2 SERPL-SCNC: 13 MMOL/L (ref 20–32)
CO2 SERPL-SCNC: 14 MMOL/L (ref 20–32)
CREAT SERPL-MCNC: 6.37 MG/DL (ref 0.52–1.04)
CREAT SERPL-MCNC: 6.6 MG/DL (ref 0.52–1.04)
ERYTHROCYTE [DISTWIDTH] IN BLOOD BY AUTOMATED COUNT: 19.9 % (ref 10–15)
GFR SERPL CREATININE-BSD FRML MDRD: 6 ML/MIN/{1.73_M2}
GFR SERPL CREATININE-BSD FRML MDRD: 7 ML/MIN/{1.73_M2}
GLUCOSE SERPL-MCNC: 118 MG/DL (ref 70–99)
GLUCOSE SERPL-MCNC: 89 MG/DL (ref 70–99)
HCT VFR BLD AUTO: 37.3 % (ref 35–47)
HGB BLD-MCNC: 11.3 G/DL (ref 11.7–15.7)
LIPASE SERPL-CCNC: 212 U/L (ref 73–393)
MCH RBC QN AUTO: 28.4 PG (ref 26.5–33)
MCHC RBC AUTO-ENTMCNC: 30.3 G/DL (ref 31.5–36.5)
MCV RBC AUTO: 94 FL (ref 78–100)
PLATELET # BLD AUTO: 160 10E9/L (ref 150–450)
POTASSIUM SERPL-SCNC: 4.6 MMOL/L (ref 3.4–5.3)
POTASSIUM SERPL-SCNC: 4.6 MMOL/L (ref 3.4–5.3)
PROT SERPL-MCNC: 6.9 G/DL (ref 6.8–8.8)
RBC # BLD AUTO: 3.98 10E12/L (ref 3.8–5.2)
SODIUM SERPL-SCNC: 132 MMOL/L (ref 133–144)
SODIUM SERPL-SCNC: 136 MMOL/L (ref 133–144)
TACROLIMUS BLD-MCNC: 6.4 UG/L (ref 5–15)
TME LAST DOSE: NORMAL H
TROPONIN I SERPL-MCNC: <0.015 UG/L (ref 0–0.04)
VORICONAZOLE SERPL-MCNC: 1.5 UG/ML (ref 1–5.5)
WBC # BLD AUTO: 9.1 10E9/L (ref 4–11)

## 2019-10-24 PROCEDURE — 12000001 ZZH R&B MED SURG/OB UMMC

## 2019-10-24 PROCEDURE — 80048 BASIC METABOLIC PNL TOTAL CA: CPT | Performed by: NURSE PRACTITIONER

## 2019-10-24 PROCEDURE — 93005 ELECTROCARDIOGRAM TRACING: CPT

## 2019-10-24 PROCEDURE — 99232 SBSQ HOSP IP/OBS MODERATE 35: CPT | Performed by: HOSPITALIST

## 2019-10-24 PROCEDURE — 25000128 H RX IP 250 OP 636: Performed by: HOSPITALIST

## 2019-10-24 PROCEDURE — 80053 COMPREHEN METABOLIC PANEL: CPT | Performed by: PHYSICIAN ASSISTANT

## 2019-10-24 PROCEDURE — 80299 QUANTITATIVE ASSAY DRUG: CPT | Performed by: NURSE PRACTITIONER

## 2019-10-24 PROCEDURE — 88185 FLOWCYTOMETRY/TC ADD-ON: CPT | Performed by: HOSPITALIST

## 2019-10-24 PROCEDURE — 25000132 ZZH RX MED GY IP 250 OP 250 PS 637: Performed by: PHYSICIAN ASSISTANT

## 2019-10-24 PROCEDURE — 25000131 ZZH RX MED GY IP 250 OP 636 PS 637: Performed by: PHYSICIAN ASSISTANT

## 2019-10-24 PROCEDURE — 74018 RADEX ABDOMEN 1 VIEW: CPT

## 2019-10-24 PROCEDURE — 25000125 ZZHC RX 250: Performed by: PHYSICIAN ASSISTANT

## 2019-10-24 PROCEDURE — 36415 COLL VENOUS BLD VENIPUNCTURE: CPT | Performed by: PHYSICIAN ASSISTANT

## 2019-10-24 PROCEDURE — 40001004 ZZHCL STATISTIC FLOW INT 9-15 ABY TC 88188: Performed by: HOSPITALIST

## 2019-10-24 PROCEDURE — 88184 FLOWCYTOMETRY/ TC 1 MARKER: CPT | Performed by: HOSPITALIST

## 2019-10-24 PROCEDURE — 85027 COMPLETE CBC AUTOMATED: CPT | Performed by: PHYSICIAN ASSISTANT

## 2019-10-24 PROCEDURE — 25000128 H RX IP 250 OP 636: Performed by: NURSE PRACTITIONER

## 2019-10-24 PROCEDURE — 25000132 ZZH RX MED GY IP 250 OP 250 PS 637: Performed by: HOSPITALIST

## 2019-10-24 PROCEDURE — 25800030 ZZH RX IP 258 OP 636: Performed by: NURSE PRACTITIONER

## 2019-10-24 PROCEDURE — 36415 COLL VENOUS BLD VENIPUNCTURE: CPT | Performed by: NURSE PRACTITIONER

## 2019-10-24 PROCEDURE — 93010 ELECTROCARDIOGRAM REPORT: CPT | Performed by: INTERNAL MEDICINE

## 2019-10-24 PROCEDURE — 84484 ASSAY OF TROPONIN QUANT: CPT | Performed by: PHYSICIAN ASSISTANT

## 2019-10-24 PROCEDURE — 49083 ABD PARACENTESIS W/IMAGING: CPT | Performed by: INTERNAL MEDICINE

## 2019-10-24 PROCEDURE — 25000131 ZZH RX MED GY IP 250 OP 636 PS 637: Performed by: HOSPITALIST

## 2019-10-24 PROCEDURE — 83690 ASSAY OF LIPASE: CPT | Performed by: PHYSICIAN ASSISTANT

## 2019-10-24 PROCEDURE — 80197 ASSAY OF TACROLIMUS: CPT | Performed by: NURSE PRACTITIONER

## 2019-10-24 PROCEDURE — 87799 DETECT AGENT NOS DNA QUANT: CPT | Performed by: NURSE PRACTITIONER

## 2019-10-24 PROCEDURE — 25000128 H RX IP 250 OP 636: Performed by: PHYSICIAN ASSISTANT

## 2019-10-24 RX ORDER — MIRTAZAPINE 15 MG/1
15 TABLET, FILM COATED ORAL AT BEDTIME
Status: DISCONTINUED | OUTPATIENT
Start: 2019-10-24 | End: 2019-10-29 | Stop reason: HOSPADM

## 2019-10-24 RX ORDER — CALCIUM CARBONATE 500 MG/1
500 TABLET, CHEWABLE ORAL DAILY PRN
Status: DISCONTINUED | OUTPATIENT
Start: 2019-10-24 | End: 2019-10-29 | Stop reason: HOSPADM

## 2019-10-24 RX ORDER — TACROLIMUS 1 MG/1
1 CAPSULE ORAL
Status: DISCONTINUED | OUTPATIENT
Start: 2019-10-24 | End: 2019-10-26

## 2019-10-24 RX ORDER — BUMETANIDE 0.25 MG/ML
5 INJECTION INTRAMUSCULAR; INTRAVENOUS ONCE
Status: COMPLETED | OUTPATIENT
Start: 2019-10-24 | End: 2019-10-24

## 2019-10-24 RX ORDER — TACROLIMUS 1 MG/1
1 CAPSULE ORAL ONCE
Status: COMPLETED | OUTPATIENT
Start: 2019-10-24 | End: 2019-10-24

## 2019-10-24 RX ORDER — CEFAZOLIN SODIUM 2 G/100ML
2 INJECTION, SOLUTION INTRAVENOUS
Status: DISCONTINUED | OUTPATIENT
Start: 2019-10-24 | End: 2019-10-25 | Stop reason: HOSPADM

## 2019-10-24 RX ORDER — SODIUM CHLORIDE 9 MG/ML
INJECTION, SOLUTION INTRAVENOUS CONTINUOUS
Status: DISCONTINUED | OUTPATIENT
Start: 2019-10-24 | End: 2019-10-25 | Stop reason: HOSPADM

## 2019-10-24 RX ADMIN — BUMETANIDE 5 MG: 0.25 INJECTION INTRAMUSCULAR; INTRAVENOUS at 10:57

## 2019-10-24 RX ADMIN — FERROUS SULFATE TAB 325 MG (65 MG ELEMENTAL FE) 325 MG: 325 (65 FE) TAB at 08:35

## 2019-10-24 RX ADMIN — PREDNISONE 5 MG: 5 TABLET ORAL at 08:35

## 2019-10-24 RX ADMIN — CEFTRIAXONE 1 G: 1 INJECTION, POWDER, FOR SOLUTION INTRAMUSCULAR; INTRAVENOUS at 04:24

## 2019-10-24 RX ADMIN — VORICONAZOLE 200 MG: 200 TABLET, FILM COATED ORAL at 19:15

## 2019-10-24 RX ADMIN — MELATONIN TAB 3 MG 3 MG: 3 TAB at 21:57

## 2019-10-24 RX ADMIN — LIDOCAINE HYDROCHLORIDE 30 ML: 20 SOLUTION ORAL; TOPICAL at 19:40

## 2019-10-24 RX ADMIN — PANTOPRAZOLE SODIUM 40 MG: 40 TABLET, DELAYED RELEASE ORAL at 19:15

## 2019-10-24 RX ADMIN — SODIUM CHLORIDE 20 MG: 9 INJECTION, SOLUTION INTRAVENOUS at 15:13

## 2019-10-24 RX ADMIN — ACETAMINOPHEN 500 MG: 500 TABLET, FILM COATED ORAL at 19:15

## 2019-10-24 RX ADMIN — MIRTAZAPINE 15 MG: 15 TABLET, FILM COATED ORAL at 21:57

## 2019-10-24 RX ADMIN — TACROLIMUS 1 MG: 1 CAPSULE ORAL at 12:08

## 2019-10-24 RX ADMIN — HEPARIN SODIUM 5000 UNITS: 5000 INJECTION, SOLUTION INTRAVENOUS; SUBCUTANEOUS at 08:35

## 2019-10-24 RX ADMIN — VORICONAZOLE 200 MG: 200 TABLET, FILM COATED ORAL at 08:35

## 2019-10-24 RX ADMIN — Medication 1 TABLET: at 08:35

## 2019-10-24 RX ADMIN — ONDANSETRON 4 MG: 4 TABLET, ORALLY DISINTEGRATING ORAL at 17:57

## 2019-10-24 RX ADMIN — MULTIPLE VITAMINS W/ MINERALS TAB 1 TABLET: TAB at 08:35

## 2019-10-24 RX ADMIN — PANTOPRAZOLE SODIUM 40 MG: 40 TABLET, DELAYED RELEASE ORAL at 08:35

## 2019-10-24 RX ADMIN — CALCIUM CARBONATE (ANTACID) CHEW TAB 500 MG 500 MG: 500 CHEW TAB at 19:41

## 2019-10-24 RX ADMIN — PREDNISONE 2.5 MG: 2.5 TABLET ORAL at 19:15

## 2019-10-24 ASSESSMENT — ACTIVITIES OF DAILY LIVING (ADL)
ADLS_ACUITY_SCORE: 11

## 2019-10-24 ASSESSMENT — MIFFLIN-ST. JEOR: SCORE: 1198.7

## 2019-10-24 NOTE — CONSULTS
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    #. 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 She also has thrombocytopenia which supports this. Kidney dysfunction and hypoalbuminemia are also potent contributors to this, and I suspect that her acute worsening is due to kidney failure.   Nodularity/coarse hepatic echotexture on ultrasound is sensitive but not specific for cirrhosis.   Her liver disease may be due to cirrhosis either from NAFLD (low risk), but most likely from chronic congestion from prior severe right sided cardiac dysfunction and pulmonary hypertension (Echo from 2/2018 notes hypokinetic RV, with RVSP of 88 mmHg; and [facility] notes PA pressures of 61 mmHg).  She may also have nodular regenerative hyperplasia (documented association with Raynauds, RA and PM) and resultant portal hypertension. She has had normal transaminases, but autoimmune hepatitis is a consideration. HBV, HCV are negative. Her recent elevation in ALP may by due to poor excretion by the kidneys or signify infiltrative parenchymal disease.    RECOMMENDATIONS  -- Check F-Actin, IgG, AMA  -- Discuss with Cardiology about repeating a right heart cath  -- May need a transjugular liver biopsy in the future (NRH vs cirrhosis)  -- Diuretics when kidney function is improved  -- Appreciate Nephrology assistance in management.    Gastroenterology follow up recommendations: ROXANNE    Thank you for involving  us in this patient's care. Please do not hesitate to contact the GI service with any questions or concerns.     Patient care plan discussed with Dr. Leventhal, GI staff physician.    Vale Mas MD  Gastroenterology - Hepatology  PGY 5 ([phone])            Chief Complaint:   We were asked by Dr. Navarro of Medicine to evaluate this patient with possible cirrhosis  History is obtained from the patient and the medical record.          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   She has had increased bloating and abdominal distension for the last one week which has impaired her breathing and appetite. She has not had any vomiting, or diarrhea. She has however had reduction in her urine output. No fever or chills. She has never been diagnosed with liver disease in the past.   On admission, she was found to have a rise in creatinine from 1.68 to 6. Of note, her Tac level on admission was 31.6.            Past Medical History:   Reviewed and edited as appropriate  Past Medical History:   Diagnosis Date     Antisynthetase syndrome (H) 2014     Chronic cough      Chronic infection     recent C diff  8/18     Dehydration 8/1/2018     Dermatomyositis (H)      Dysplasia of cervix, low grade (ESTRADA 1)      ILD (interstitial lung disease) (H)      Osteopenia      PONV (postoperative nausea and vomiting)      Pulmonary hypertension (H)      Raynaud's disease      Seronegative rheumatoid arthritis (H)           Past Surgical History:   Reviewed and edited as appropriate   Past Surgical History:   Procedure Laterality Date     BRONCHOSCOPY (RIGID OR FLEXIBLE), DIAGNOSTIC N/A  4/10/2018    Procedure: COMBINED BRONCHOSCOPY (RIGID OR FLEXIBLE), LAVAGE;;  Surgeon: Mariposa Donohue MD;  Location: [facility]     BRONCHOSCOPY (RIGID OR FLEXIBLE), DILATE BRONCHUS / TRACHEA N/A 10/11/2018    Procedure: BRONCHOSCOPY (RIGID OR FLEXIBLE), DILATE BRONCHUS / TRACHEA;  Flexible And Rigid Bronchoscopy and Dilation;  Surgeon: Wilber Lin MD;  Location: [facility]     BRONCHOSCOPY FLEXIBLE N/A 3/13/2018    Procedure: BRONCHOSCOPY FLEXIBLE;  Flexible Bronchoscopy ;  Surgeon: Gissell Sanchez MD;  Location: [facility]     BRONCHOSCOPY FLEXIBLE N/A 5/9/2018    Procedure: BRONCHOSCOPY FLEXIBLE;;  Surgeon: Wilber Lin MD;  Location: [facility]     BRONCHOSCOPY FLEXIBLE AND RIGID N/A 9/10/2018    Procedure: BRONCHOSCOPY FLEXIBLE AND RIGID;  Flexible and Rigid Bronchoscopy with Balloon Dilation, tissue debulking with cryo, and Right mainstem bronchus stent placement;  Surgeon: Wilber Lin MD;  Location: [facility]     BRONCHOSCOPY RIGID N/A 6/6/2018    Procedure: BRONCHOSCOPY RIGID;;  Surgeon: Lopez Macias MD;  Location: [facility]     BRONCHOSCOPY, DILATE BRONCHUS, STENT BRONCHUS, COMBINED N/A 6/11/2018    Procedure: COMBINED BRONCHOSCOPY, DILATE BRONCHUS, STENT BRONCHUS;  Flexible Bronchoscopy, Balloon Dilation, Bronchial Washings;  Surgeon: Wilber Lin MD;  [facility]     BRONCHOSCOPY, DILATE BRONCHUS, STENT BRONCHUS, COMBINED Right 7/10/2018    Procedure: COMBINED BRONCHOSCOPY, DILATE BRONCHUS, STENT BRONCHUS;  Flexible Bronchoscopy, Balloon Dilation, Bronchial Washings  ;  Surgeon: Wilber Lin MD;  [facility]     BRONCHOSCOPY, DILATE BRONCHUS, STENT BRONCHUS, COMBINED N/A 8/2/2018    Procedure: COMBINED BRONCHOSCOPY, DILATE BRONCHUS, STENT BRONCHUS;  Flexible Bronchoscopy, Bronchial Washings, Balloon Dilation;  Surgeon: Wilber Lin MD;  [facility]     BRONCHOSCOPY, DILATE BRONCHUS, STENT BRONCHUS, COMBINED N/A 8/20/2018    Procedure:  COMBINED BRONCHOSCOPY, DILATE BRONCHUS, STENT BRONCHUS;  Flexible Bronchoscopy, Balloon Dilation;  Surgeon: Wilber Lin MD;  [facility]     BRONCHOSCOPY, DILATE BRONCHUS, STENT BRONCHUS, COMBINED N/A 10/29/2018    Procedure: Flexible Bronchoscopy, Balloon Dilation, Stent Revision, Airway Examination And Therapeutic Suctioning, Cyro Tumor Debulking;  Surgeon: Wilber Lin MD;  [facility]     BRONCHOSCOPY, DILATE BRONCHUS, STENT BRONCHUS, COMBINED N/A 11/20/2018    Procedure: Rigid Bronchoscopy, Stent Removal and dilitation;  Surgeon: Wilber Lin MD;  [facility]     BRONCHOSCOPY, DILATE BRONCHUS, STENT BRONCHUS, COMBINED N/A 12/14/2018    Procedure: Flexible And Rigid Bronchoscopy, Balloon Dilation, Bronchial Washing;  Surgeon: Wilber Lin MD;  [facility]     BRONCHOSCOPY, DILATE BRONCHUS, STENT BRONCHUS, COMBINED N/A 1/17/2019    Procedure: Flexible And Rigid Bronchoscopy, Balloon Dilation.;  Surgeon: Wilber Lin MD;  [facility]     BRONCHOSCOPY, DILATE BRONCHUS, STENT BRONCHUS, COMBINED N/A 3/7/2019    Procedure: Flexible and Rigid Bronchoscopy, Bronchial Washing, Balloon Dilation;  Surgeon: Wilber Lin MD;  [facility]     BRONCHOSCOPY, DILATE BRONCHUS, STENT BRONCHUS, COMBINED N/A 6/6/2019    Procedure: Rigid and Flexible Bronchoscopy, Balloon Dilation;  Surgeon: Wilber Lin MD;  [facility]     BRONCHOSCOPY, DILATE BRONCHUS, STENT BRONCHUS, COMBINED N/A 10/11/2019    Procedure: Flexible and Rigid Bronchoscopy, Balloon Dilation, Bronchoalveolar Lagave;  Surgeon: Wilber Lin MD;  Location[facility]     ENT SURGERY      tonsillectomy as a child     ESOPHAGOSCOPY, GASTROSCOPY, DUODENOSCOPY (EGD), COMBINED N/A 10/29/2018    Procedure: COMBINED ESOPHAGOSCOPY, GASTROSCOPY, DUODENOSCOPY (EGD) with biopsies and polypectomy;  Surgeon: Chente Bloom MD;  [facility]     INSERT EXTRACORPORAL  MEMBRANE OXYGENATOR ADULT (OUTSIDE OR) N/A 2/27/2018    Procedure: INSERT EXTRACORPORAL MEMBRANE OXYGENATOR ADULT (OUTSIDE OR);  INSERT EXTRACORPORAL MEMBRANE OXYGENATOR ADULT (OUTSIDE OR) ;  Surgeon: Toby Hernandez MD;  Location: [facility]     IR THORACENTESIS  9/13/2019     no prior surgery       REMOVE EXTRACORPORAL MEMBRANE OXYGENATOR ADULT N/A 3/3/2018    Procedure: REMOVE EXTRACORPORAL MEMBRANE OXYGENATOR ADULT;  Removal of Right Femoral Venous and Right Axillary Arterial Extracorporeal Membrane Oxygenator;  Surgeon: Toby Hernandez MD;  [facility]     TRANSPLANT LUNG RECIPIENT SINGLE X2 Bilateral 3/1/2018    Procedure: TRANSPLANT LUNG RECIPIENT SINGLE X2;  Median Sternotomy, Extracorporeal Membrane Oxygenator, bilateral sequential lung transplant;  Surgeon: Toby Hernandez MD;  Location[facility]          Previous Endoscopy:   No results found. However, due to the size of the patient record, not all encounters were searched. Please check Results Review for a complete set of results.         Social History:   Reviewed and edited as appropriate  Lives with , has three daughters  Drinks 1-2 glasses of wine per week, none since transplant  Has never smoked         Family History:   Reviewed and edited as appropriate  No known history of gastrointestinal/liver disease or  gastrointestinal malignancies       Allergies:   Reviewed and edited as appropriate   No Known Allergies         Medications:     Medications Prior to Admission   Medication Sig Dispense Refill Last Dose     acetaminophen (TYLENOL) 500 MG tablet Take 1,000 mg by mouth every 6 hours as needed for pain     at prn     amLODIPine (NORVASC) 10 MG tablet Take 1 tablet (10 mg) by mouth daily 30 tablet 11 10/21/2019     calcium-vitamin D (CALTRATE) 600-400 MG-UNIT per tablet Take 1 tablet by mouth daily   10/21/2019     carvedilol (COREG) 25 MG tablet Take 1 tablet (25 mg) by mouth 2 times daily (with meals) 60  tablet 0 10/21/2019     dronabinol (MARINOL) 5 MG capsule Take 5 mg by mouth 2 times daily (before meals) as needed for nausea   Past Month at prn     ferrous sulfate (FEROSUL) 325 (65 Fe) MG tablet Take 1 tablet (325 mg) by mouth daily (with breakfast) 60 tablet 2 10/21/2019     isosorbide mononitrate (IMDUR) 120 MG 24 HR ER tablet Take 1 tablet (120 mg) by mouth daily 30 tablet 0 10/21/2019     magnesium oxide (MAG-OX) 400 MG tablet Take 400 mg by mouth daily    10/21/2019     mirtazapine (REMERON SOL-TAB) 15 MG ODT 1 tablet (15 mg) by Orally disintegrating tablet route At Bedtime (Patient taking differently: 15 mg by Orally disintegrating tablet route At Bedtime Held by providers a few day prior to 10/21/19 Hospital Admission) 30 tablet 0 Past Week at Unknown time     multivitamin, therapeutic with minerals (THERA-VIT-M) TABS tablet Take 1 tablet by mouth daily 30 each 11 10/21/2019 at Unknown time     ondansetron (ZOFRAN) 4 MG tablet Take 1 tablet (4 mg) by mouth every 12 hours as needed for nausea 60 tablet 0 Past Week at prn     pantoprazole (PROTONIX) 40 MG EC tablet Take 1 tablet (40 mg) by mouth 2 times daily 60 tablet 11 10/21/2019     predniSONE (DELTASONE) 2.5 MG tablet Take two tablets (5mg) every AM and one tablet (2.5mg) every evening 90 tablet 11 10/21/2019     senna-docusate (SENOKOT-S/PERICOLACE) 8.6-50 MG tablet Take 2 tablets by mouth 2 times daily as needed for constipation 60 tablet 0  at prn     sulfamethoxazole-trimethoprim (BACTRIM/SEPTRA) 400-80 MG tablet Take 1 tablet by mouth daily 90 tablet 3 10/21/2019     tacrolimus (GENERIC EQUIVALENT) 0.5 MG capsule Take 1 capsule (0.5 mg) by mouth every morning Total dose: 2.5 mg in the AM and 2 mg in the PM (Patient taking differently: Take 0.5 mg by mouth every evening Total dose: 2 mg in the AM and 2.5 mg in the PM) 90 capsule 3 10/21/2019     tacrolimus (GENERIC EQUIVALENT) 1 MG capsule Take 2 capsules (2 mg) by mouth 2 times daily Total dose:  "2.5 mg in the AM and 2 mg in the PM (Patient taking differently: Take 2 mg by mouth 2 times daily Total dose: 2 mg in the AM and 2.5 mg in the PM) 360 capsule 3 10/21/2019     voriconazole (VFEND) 200 MG tablet Take 1 tablet (200 mg) by mouth every 12 hours for 28 days 56 tablet 0 10/21/2019           Review of Systems:     A complete review of systems was performed and is negative except as noted in the HPI           Physical Exam:   /86 (BP Location: Right arm)   Pulse 84   Temp 97.2  F (36.2  C) (Axillary)   Resp 18   Ht 1.626 m (5' 4\")   Wt 62.4 kg (137 lb 8 oz)   LMP 06/07/2014 (Exact Date)   SpO2 96%   BMI 23.60 kg/m    Wt:   Wt Readings from Last 2 Encounters:   10/24/19 62.4 kg (137 lb 8 oz)   10/12/19 58.1 kg (128 lb)      Constitutional: cooperative, pleasant, not dyspneic/diaphoretic, no acute distress  Eyes: Sclera anicteric   Ears/nose/mouth/throat: Normal oropharynx without ulcers or exudate  Neck: supple   CV: Edema  Respiratory: Unlabored breathing  Lymph: No axillary, submandibular, supraclavicular or inguinal lymphadenopathy  Abdomen: distended, soft, +bs, no hepatosplenomegaly, nontender, no peritoneal signs  Skin: warm, perfused, no jaundice  Neuro: AAO x 3, No asterixis  Psych: Normal affect  MSK: In bed         Data:   Labs and imaging below were independently reviewed and interpreted  BMP  Recent Labs   Lab 10/24/19  0557 10/23/19  2112 10/23/19  0322 10/22/19  1314 10/21/19  1752   *  --  135 133 133   POTASSIUM 4.6 4.8 4.8 4.5 5.0   CHLORIDE 105  --  109 109 109   CHELSEY 8.8  --  8.0* 8.2* 8.0*   CO2 13*  --  11* 13* 10*   BUN 71*  --  70* 66* 66*   CR 6.60*  --  6.62* 6.18* 5.76*   GLC 89  --  139* 127* 138*     CBC  Recent Labs   Lab 10/23/19  0322 10/22/19  1314 10/21/19  1451   WBC 5.9 5.3 6.3   RBC 3.27* 3.18* 3.34*   HGB 9.1* 9.0* 9.4*   HCT 30.4* 29.7* 31.4*   MCV 93 93 94   MCH 27.8 28.3 28.1   MCHC 29.9* 30.3* 29.9*   RDW 19.6* 19.5* 19.4*   * 142* 134* "     INR  Recent Labs   Lab 10/22/19  1314   INR 1.12     LFTs  Recent Labs   Lab 10/23/19  0322 10/22/19  1314 10/21/19  1451   ALKPHOS 174* 181* 194*   AST 12 19 18   ALT 13 18 16   BILITOTAL 0.3 0.4 0.3   PROTTOTAL 6.3* 6.7* 7.2   ALBUMIN 2.6* 2.7* 3.0*      PANC  Recent Labs   Lab 10/21/19  1451   LIPASE 119       Imaging:  US ABDOMEN LIMITED WITH DOPPLER COMPLETE, 10/22/2019  10:09 AM   Findings:   Liver: The liver demonstrates coarse echotexture. No evidence of a  focal hepatic mass or intrahepatic biliary ductal dilatation.    Extrahepatic portal vein flow is antegrade, measuring 19 cm/sec.  Left portal vein flow is antegrade, measuring 13 cm/sec.  Right portal vein flow is antegrade, measuring 9 cm/sec.   Flow in the hepatic artery is towards the liver and:  38 cm/sec peak systolic  0.87 resistive index.   Left hepatic artery is not visualized.   Splenic vein is not visualized. The left, middle, and right hepatic  veins are patent with flow towards the IVC. The IVC is not  well-visualized.    Gallbladder: Diffuse thickening of the gallbladder wall measuring up  to 13 mm. Punctate echogenic intraluminal foci with no shadowing may  represent sludge ball versus polyp (image 24 & 27). No stones are  detected.    Bile Ducts: Both the intra- and extrahepatic biliary system are of  normal caliber.  The common bile duct measures 6 mm in diameter.   Pancreas: Pancreas obscured by bowel gas.   Kidneys: Atrophic right kidney measuring 8.4 cm, detailed description  in renal duplex ultrasound done on same date.   Fluid: Ascites.                                                                   Impression:  1. Coarse hepatic echotexture, which may be seen with intrinsic  parenchymal liver disease.  2. Moderate ascites.  3. Patent visualized hepatic and portal vessels.  4. Nonspecific circumferential gallbladder wall thickening which may  be seen with ascites and systemic/hepatic disease.  5. Punctate nonmobile, non  shadowing echogenic foci in the  gallbladder, may represent adherent sludge ball versus polyp.

## 2019-10-24 NOTE — CONSULTS
Consult and Procedure Service - Procedure Note    Attending: Chapito  Resident: n/a   Indication: remove fluid, assess for cytology  Risk Assessment: low  Pre-procedure diagnosis: ascites  Post-procedure diagnosis: ascites  Procedure name: paracentesis    The risks and benefits of the procedure were explained to patient and  who expressed understanding and opted to proceed.  Consent was already obtained and placed in the chart.  A time out was performed.  An area of ascites was located with ultrasound and marked in the RL quadrant; the area was prepped and draped in the usual sterile fashion.  10 ml of 1% lidocaine was instilled with a 25 gauge needle and ascites located under real-time guidance. 2500 ml of light yellow colored fluid was removed and sent for analysis and the area dressed. No leak. Pt placed in LLdecub position. Fluid sent to lab for cytology.    Patient tolerated the procedure well. Please contact the Consult and Procedure Service if any complications or concerns arise.     BRENDA FAYE MD, [facility]  Internal Medicine Hospitalist & Staff Physician  [facility]  Pager: [phone]  [email or web address]  October 24, 2019

## 2019-10-24 NOTE — PROGRESS NOTES
Patient urinated however missed the hat entirely and two hats are in place per patient had a good stream for 15 seconds

## 2019-10-24 NOTE — CONSULTS
INTERVENTIONAL RADIOLOGY CONSULT    Kecia Blue is a 58 year old female with acute on chronic kidney disease in the setting of lung transplant with recent empyema. She now requires hemodialysis so IR has been consulted for tunneled central venous catheter placement. Dialysis will begin this afternoon after catheter placement per nephrologist Dr. Yenni Gamble.    Patient is on IR schedule today for a tunneled dialysis catheter placement. Labs WNL for procedure. Keep NPO. Consent will be done prior to procedure.     Discussed with Yenni Gamble MD.    Kenneth Sparrow PA-C  Interventional Radiology  [phone]

## 2019-10-24 NOTE — PROGRESS NOTES
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  She was admitted 10/21 with abdominal bloating and increased shortness of breath, s/p paracentesis for 2L fluid removal with rapid reaccumulation.    Today's recommendations:  - Recommend diuresis  - Resumed tacrolimus today at lower dose and closely monitor tacro levels (ordered 10/26-10/28), basiliximab today and redose 10/28 if still subtherapeutic  - CMV pending  - Voriconazole level pending  - Check EBV today  - Paracentesis today per primary team  - Recommend leukemia lymphoma evaluation with paracentesis fluid  - Recommend hepatology consultation      S/p bilateral sequential lung transplant (BSLT): Reports increased dyspnea, likely due to pressure on her diaphragm from the recurrent ascites and also possibly from metabolic acidosis from renal dysfunction. No hypoxia, nonproductive congested cough.  CT scan (10/21) with small bilateral pleural effusions).  CXR (10/23) with increase in bilateral pleural effusions R>L as compared with 10/6.  - Volume and renal management per primary and nephrology teams, recommend diuresis     Immunosuppression: On 2-drug IST for recurrent CMV and leukpenia  - Tacrolimus dose held on admission for supratherapeutic level (PTA 2.5/2). Goal 8-10.  Level today 6.4, resume tacrolimus today at 1 mg BID and closely monitor tacro levels (ordered 10/26-10/28).  In light of subtherapeutic level today and possible  subtherapeutic levels in the upcoming days with very conservative dosing, will give basiliximab today (ordered) and redose 10/28 if still subtherapeutic (not ordered).  - Prednisone 5 mg qAM / 2.5 mg qPM     CMV reactivation: CMV D+/R+. First positive level 7/9/18, bronch CMV as high as 3.1 million (8/2/18).  Valgancyclovir therapy stopped 7/25.  CMV level has been transiently elevated, but generally has remained low and <137 since 9/24.  - CMV weekly, pending (10/24)     Aspergillus empyema: Aspergillus noted in pleural space 10/8.  Has been on voriconazole course since 10/10, LFTs generally stable save for low level alk phos.  F/u EKG after 1 week with QTc 469.  - Continue voriconazole 200 mg BID, course TBD (minimum 3 months)  - Voriconazole level pending (10/24)  - Recommend repeat chest CT this admission     Other problems managed by the primary team:     Ascites: Abdominal CT (10/21) with moderate ascites/anasarca significantly increased from 9/12 and 10/1, gallbladder wall thickening with unchanged cholelithiasis soft tissue density in the medial left costophrenic angle, mild hepatosplenomegaly, and with mild wall thickening in the cecum, ascending colon and splenic flexure.  Abdominal US (10/22) with coarse hepatic echotexture, moderate ascites patent hepatic and portal vessels circumferential gallbladder wall thickening possible sludge versus gallbladder polyp.  Alkaline phosphatase is mildly elevated, remaining liver panel normal.  Ascites may be due to renal failure (see below) but the CT reading suggests there may have been some ascites on previous September and October imaging.  Need to consider underlying liver disease.  - Check EBV today (ordered)  - Paracentesis today per primary team  - Recommend leukemia lymphoma evaluation with paracentesis fluid  - Management per primary team  - Recommend hepatology consultation     Acute on chronic kidney disease: The patient had stage III CKD but appears to  "have RADHA on admission, possibly d/t supratherapeutic tacrolimus level on admission (31.6, true trough).  It has probably been gradually increasing since the addition of voriconazole at her last visit.  Prior tacrolimus level was 5.3 on 10/10 and she was already developing ascites by 10/13, which seems a little too fast for ascites due to renal failure due to tacrolimus.  Although this is the most likely cause, a broader differential should be maintained.   - Management per nephrology and primary teams     We appreciate the excellent care provided by the [place] [id number] team. Recommendations communicated via in person rounding and this note. Will continue to follow along closely, please do not hesitate to call with any questions or concerns.     Patient discussed with Dr. Macias.    Hina Huff, DNP, APRN, CNP  Inpatient Nurse Practitioner  Pulmonary CF/Transplant  Pager [phone]     Subjective & Interval History:     Abdominal pain continues to gradually worsening with increased fluid accumulation.  No nausea, frequent loose stools.  No SOB at rest, but does endorse increasing DESHPANDE.  Some increase in UO.    Review of Systems:     C: no fever, no chills  INTEGUMENTARY/SKIN: no rash or obvious new lesions  ENT/MOUTH: no sore throat, no sinus pain, no nasal congestion or drainage  RESP: see interval history  CV: no chest pain, no palpitations, + peripheral edema  GI: no reflux symptoms  : + oliguria  MUSCULOSKELETAL: no myalgias, no arthralgias  ENDOCRINE: blood sugars with adequate control  NEURO: no headache, no numbness or tingling  PSYCHIATRIC: mood stable    Physical Exam:     Vital signs:  Temp: 97.2  F (36.2  C) Temp src: Axillary BP: 136/86 Pulse: 84 Heart Rate: 90 Resp: 18 SpO2: 96 % O2 Device: None (Room air) Oxygen Delivery: 1/2 LPM Height: 162.6 cm (5' 4\") Weight: 62.4 kg (137 lb 8 oz)  I/O:     Intake/Output Summary (Last 24 hours) at 10/24/2019 1258  Last data filed at 10/24/2019 1109  Gross per 24 " hour   Intake 300 ml   Output 455 ml   Net -155 ml     Constitutional: Sitting up in bed, in no apparent distress.   HEENT: Eyes with pink conjunctivae, anicteric. Oral mucosa moist without lesions.    PULM: Diminished and coarse t/o left and to RLL, no rhonchi, no wheezes. Non-labored breathing on RA.  CV: Normal S1 and S2. RRR. No murmur, gallop, or rub. 1+ pitting BLE edema.   ABD: NABS, firm and distended, nontender.    MSK: Moves all extremities. No apparent muscle wasting.   NEURO: Alert and oriented, conversant.   SKIN: Warm, dry. No rash on limited exam.   PSYCH: Mood stable.    Lines, Drains, and Devices:  Peripheral IV 10/21/19 Left Lower forearm (Active)   Site Assessment WDL 10/24/2019  9:00 AM   Line Status Saline locked 10/24/2019  9:00 AM   Phlebitis Scale 0-->no symptoms 10/24/2019  9:00 AM   Infiltration Scale 0 10/24/2019  9:00 AM   Infiltration Site Treatment Method  None 10/24/2019  9:00 AM   Extravasation? No 10/24/2019  9:00 AM   Number of days: 3     Data:     LABS    CMP:   Recent Labs   Lab 10/24/19  0557 10/23/19  2112 10/23/19  0322 10/22/19  1314 10/21/19  1752 10/21/19  1451   *  --  135 133 133 132*   POTASSIUM 4.6 4.8 4.8 4.5 5.0 4.5   CHLORIDE 105  --  109 109 109 107   CO2 13*  --  11* 13* 10* 14*   ANIONGAP 14  --  15* 12 13 12   GLC 89  --  139* 127* 138* 111*   BUN 71*  --  70* 66* 66* 67*   CR 6.60*  --  6.62* 6.18* 5.76* 5.90*   GFRESTIMATED 6*  --  6* 7* 8* 7*   GFRESTBLACK 7*  --  7* 8* 9* 8*   CHELSEY 8.8  --  8.0* 8.2* 8.0* 8.5   MAG  --   --  2.0 2.1 2.0  --    PHOS  --   --  7.4* 7.3* 6.7*  --    PROTTOTAL  --   --  6.3* 6.7*  --  7.2   ALBUMIN  --   --  2.6* 2.7*  --  3.0*   BILITOTAL  --   --  0.3 0.4  --  0.3   ALKPHOS  --   --  174* 181*  --  194*   AST  --   --  12 19  --  18   ALT  --   --  13 18  --  16     CBC:   Recent Labs   Lab 10/23/19  0322 10/22/19  1314 10/21/19  1451   WBC 5.9 5.3 6.3   RBC 3.27* 3.18* 3.34*   HGB 9.1* 9.0* 9.4*   HCT 30.4* 29.7* 31.4*    MCV 93 93 94   MCH 27.8 28.3 28.1   MCHC 29.9* 30.3* 29.9*   RDW 19.6* 19.5* 19.4*   * 142* 134*       INR:   Recent Labs   Lab 10/22/19  1314   INR 1.12       Glucose:   Recent Labs   Lab 10/24/19  0557 10/23/19  0322 10/22/19  1314 10/21/19  1752 10/21/19  1451   GLC 89 139* 127* 138* 111*       Blood Gas:   Recent Labs   Lab 10/21/19  1459   PHV 7.25*   PCO2V 29*   PO2V 35   HCO3V 13*       Culture Data   Recent Labs   Lab 10/22/19  1430   CULT Culture negative monitoring continues       Virology Data:   Lab Results   Component Value Date    FLUAH1 Negative 10/07/2019    FLUAH3 Negative 10/07/2019    [id number] Negative 10/07/2019    IFLUB Negative 10/07/2019    RSVA Negative 10/07/2019    RSVB Negative 10/07/2019    PIV1 Negative 10/07/2019    PIV2 Negative 10/07/2019    PIV3 Negative 10/07/2019    HMPV Negative 10/07/2019    HRVS Negative 10/07/2019    ADVBE Negative 10/07/2019    ADVC Negative 10/07/2019    ADVC Negative 03/07/2019    ADVC Negative 01/17/2019       Historical CMV results (last 3 of prior testing):  Lab Results   Component Value Date    CMVQNT CMV DNA Not Detected 10/06/2019    CMVQNT <137 (A) 09/12/2019    CMVQNT <137 (A) 06/05/2019     Lab Results   Component Value Date    CMVLOG Not Calculated 10/06/2019    CMVLOG <2.1 09/12/2019    CMVLOG <2.1 06/05/2019       Urine Studies    Recent Labs   Lab Test 10/21/19  2240 09/12/19  0125   URINEPH 5.0 7.5*   NITRITE Negative Negative   LEUKEST Large* Negative   WBCU 115* 3       Most Recent [place] Pulmonary Function Testing (FVC/FEV1 only)  FVC-Pre   Date Value Ref Range Status   08/07/2019 2.22 L    06/05/2019 2.26 L    03/05/2019 1.97 L    01/16/2019 1.92 L      FVC-%Pred-Pre   Date Value Ref Range Status   08/07/2019 67 %    06/05/2019 70 %    03/05/2019 60 %    01/16/2019 59 %      FEV1-Pre   Date Value Ref Range Status   08/07/2019 1.60 L    06/05/2019 1.65 L    03/05/2019 1.31 L    01/16/2019 1.04 L      FEV1-%Pred-Pre   Date Value Ref  Range Status   2019 61 %    2019 64 %    2019 51 %    2019 40 %        IMAGING    Recent Results (from the past 48 hour(s))   XR Chest 2 Views    Narrative    Exam: XR CHEST 2 VW, 10/23/2019 9:43 AM    Indication: lung transplant with dyspnea    Comparison: 10/6/2019    Findings:   Increasing right pleural effusion. There is associated atelectasis.  Linear atelectasis at the left lung base. Mild pulmonary venous  congestion. Heart within normal limits. Low lung volume.      Impression    Impression:   1. Low lung volumes versus mild pulmonary venous congestion.  2. Increasing right pleural effusion with associated atelectasis.  Consider pneumonia clinically.  3. Increasing left lower lobe atelectasis.    ANNE MANZANO MD   Echocardiogram Complete    Narrative    [id number]  [id number]  [id number]  [id number]^BRANDEE^NIYAH^ADDIE           [facility],[place]  Echocardiography Laboratory  [address] [place], [place] [address]     Name: SARAI KILLIAN  MRN: [id number]  : 1960  Study Date: 10/23/2019 09:53 AM  Age: 58 yrs  Gender: Female  Patient Location: [facility] hospital  Reason For Study: Heart Failure  Ordering Physician: NIYAH IRVIN  Performed By: MARTHA Montes     BSA: 1.7 m2  Height: 64 in  Weight: 148 lb  HR: 78  BP: 125/74 mmHg  _____________________________________________________________________________  __        Procedure  Echocardiogram with two-dimensional, color and spectral Doppler performed.  _____________________________________________________________________________  __        Interpretation Summary  Left ventricular function, chamber size, wall motion, and wall thickness are  normal.The EF is 60-65%.  Right ventricular function, chamber size, wall motion, and thickness are  normal.  Mild to moderate tricuspid insufficiency is present.  Mild pulmonary hypertension is present.  Pulmonary artery systolic pressure is 48 mmHg (33 mmHg+RA  pressure).  This study was compared with the study from 3/20/18: There has been no  significant change.  _____________________________________________________________________________  __        Left Ventricle  Left ventricular function, chamber size, wall motion, and wall thickness are  normal.The EF is 60-65%. Left ventricular diastolic function is indeterminate.     Right Ventricle  Right ventricular function, chamber size, wall motion, and thickness are  normal.     Atria  The right atria appears normal. Mild left atrial enlargement is present. The  atrial septum is intact as assessed by color Doppler .     Mitral Valve  The mitral valve is normal. Mild mitral insufficiency is present.        Aortic Valve  Aortic valve is normal in structure and function. The aortic valve is  tricuspid.     Tricuspid Valve  Mild to moderate tricuspid insufficiency is present. Mild pulmonary  hypertension is present. Pulmonary artery systolic pressure is 48 mmHg (33  mmHg+RA pressure).     Vessels  The aorta root is normal. The pulmonary artery cannot be assessed. Dilation of  the inferior vena cava is present with abnormal respiratory variation in  diameter. IVC diameter >2.1 cm collapsing <50% with sniff suggests a high RA  pressure estimated at 15 mmHg or greater.     Pericardium  No pericardial effusion is present.     Miscellaneous  A left pleural effusion is present. Ascites is noted.        Compared to Previous Study  This study was compared with the study from 3/20/18 . There has been no  significant change.  _____________________________________________________________________________  __  MMode/2D Measurements & Calculations     RVDd: 3.6 cm  IVSd: 1.1 cm  LVIDd: 4.1 cm  LVIDs: 2.9 cm  LVPWd: 0.87 cm  FS: 30.3 %  LV mass(C)d: 129.7 grams  LV mass(C)dI: 75.4 grams/m2  asc Aorta Diam: 3.0 cm  LVOT diam: 1.8 cm  LVOT area: 2.5 cm2  LA Volume Index (BP): 41.4 ml/m2  RWT: 0.42  TAPSE: 1.2 cm           Doppler Measurements &  Calculations  MV E max herberth: 83.8 cm/sec  MV A max herberth: 78.5 cm/sec  MV E/A: 1.1  MV dec slope: 386.0 cm/sec2  MV dec time: 0.22 sec  TV max P.5 mmHg  PA acc time: 0.12 sec  TR max herberth: 285.0 cm/sec  TR max P.5 mmHg  E/E' av.2  Lateral E/e': 7.4  Medial E/e': 11.1     _____________________________________________________________________________  __           Report approved by: Glen Medina 10/23/2019 01:20 PM

## 2019-10-24 NOTE — PLAN OF CARE
Pt A&O, VSS on RA. DESHPANDE. Pt having significant discomfort due to abd distention. No appetite. Edema noted on bilateral flanks. Oliguric, 100ml UO tonight, one time dose of Bumex given on day shift. Pt reports 5 loose BMs today. Paracentesis site to RLQ CDI. L PIV SL. Creat 6.62. Phos 7.4. Nephrology dosing diuretics, may need HD. ECHO today unchanged from previous. Cxray showed R pleural effusion w/ atelectasis. Continuing with Rocephin for potential UTI. Plan to continue monitoring kidney fxn.

## 2019-10-24 NOTE — PROGRESS NOTES
Transplant Social Work Services Progress Note      Date of Initial Social Work Evaluation: 10/22/2019  Collaborated with: lung transplant team    Data: supportive visit with patient and spouse.  They are understandably disappointed that she is having kidney trouble.  Hopeful it is just temporary and that kidney will recover.    Intervention: supportive counseling  Assessment: disappointed by hopeful    Education provided by SW: n/a  Plan:    Discharge Plans in Progress: none at this time    Barriers to d/c plan: medical stability.    Follow up Plan: will continue to follow for support, counseling, education and resources.

## 2019-10-24 NOTE — PLAN OF CARE
Pt A&O. VSS on RA. Up ind. Complains of abd discomfort/fullness. Denies SOB. Low urine output, 50 ml this shift. Loose stools. L PIV-SL. Slept most of the night. Will cont to monitor.

## 2019-10-24 NOTE — PLAN OF CARE
"/76 (BP Location: Right arm)   Pulse 84   Temp 98  F (36.7  C) (Axillary)   Resp 18   Ht 1.626 m (5' 4\")   Wt 62.4 kg (137 lb 8 oz)   LMP 06/07/2014 (Exact Date)   SpO2 98%   BMI 23.60 kg/m       Time: 3135-9541     Reason for admission: RADHA and Ascites. Found to be renal failure with probable liver disease  Vitals: VSS on RA  Activity: Independent. Dyspnea on exertion.   Pain: No reports of pain. Except abdominal discomfort. Declines Tylenol @ this time  Neuro: WDL  Cardiac: WDL  Respiratory: SOB with ambulation. Fine crackles in bilateral lobes.  GI/: Diarrhea reported x2. Oliguric. IV Bumex given with very little results.   Diet: Renal diet, low appetite but tolerating. Plan for NPO @ MN  Lines: Left PIV SL  Wounds: Intact  Labs/imaging: Creat remains elevated @ 6.60.      New changes this shift: Discontinued IV abx. Paracentesis completed, removed 3 L of fluid. Sent fluid down to test for Lymphoma and Leukemia. IR consult placed for HD line to be placed tomorrow. Per nephrology, probable plan for dialysis.      Plan:. HD to be placed tomorrow. Starting Dialysis soon. Continue to monitor renal and liver function.      Continue to monitor and follow POC   "

## 2019-10-24 NOTE — PROGRESS NOTES
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  Anruic RADHA likely related to FK level of 30, less likely AIN or GN (UA shows WBC/RBC and 30 albumin with SG 1.018).    - no improvement in kidney function, tacro down to 6.4 today  - oliguric in the last 24 hours WITH diuretic  - if remains oliguric tomorrow will initiate hemodialysis for volume management     2. Hypervolemia - suboptimal response to IV bumex yesterday though was oliguric with ~250 ml urine, trial bumex IV again today.    - getting paracentesis again today     3. Acidosis - likely due to RADHA, discontinue sodium bicarbonate as it might me contributing to bloating  - OK for protein shakes    4. Hyperphosphatemia - due to RADHA, with phos of 7.4 in RADHA a binder is not indicated    5. Nutrition - OK if dietician wants to order protein shakes since Kecia is having trouble eating    Yenni Gamble MD   [phone]    Interval History :   Nursing and provider notes from last 24 hours reviewed.  In the last 24 hours Kecia Blue had IV bumex and had UOP ~250 ml, she continues to have poor appetite, leg edema, abdominal bloating and dyspnea with exertion.  No fever or chills.  Not requiring supplemental oxygen.    Review of Systems:   I reviewed the following systems:  GI: poor appetite. no nausea or vomiting, + diarrhea (6 episodes).   Neuro:  no confusion  Constitutional:  no fever or chills  CV: improving dyspnea but has ongoing edema.  no chest pain.    Physical Exam:   I/O last 3 completed shifts:  In: 382 [P.O.:340; I.V.:42]  Out: 270 [Urine:270]   /86 (BP  "Location: Right arm)   Pulse 84   Temp 97.2  F (36.2  C) (Axillary)   Resp 18   Ht 1.626 m (5' 4\")   Wt 62.4 kg (137 lb 8 oz)   LMP 06/07/2014 (Exact Date)   SpO2 96%   BMI 23.60 kg/m       GENERAL APPEARANCE: no distress  EYES:  no scleral icterus, pupils equal  HENT: mouth without ulcers or lesions  PULM: normal work of breathing  CV:      -JVD not elevated     -edema 2+ at sacrum   GI: distended      Labs:   All labs reviewed by me  Electrolytes/Renal -   Recent Labs   Lab Test 10/24/19  0557 10/23/19  2112 10/23/19  0322 10/22/19  1314 10/21/19  1752   *  --  135 133 133   POTASSIUM 4.6 4.8 4.8 4.5 5.0   CHLORIDE 105  --  109 109 109   CO2 13*  --  11* 13* 10*   BUN 71*  --  70* 66* 66*   CR 6.60*  --  6.62* 6.18* 5.76*   GLC 89  --  139* 127* 138*   CHELSEY 8.8  --  8.0* 8.2* 8.0*   MAG  --   --  2.0 2.1 2.0   PHOS  --   --  7.4* 7.3* 6.7*       CBC -   Recent Labs   Lab Test 10/23/19  0322 10/22/19  1314 10/21/19  1451   WBC 5.9 5.3 6.3   HGB 9.1* 9.0* 9.4*   * 142* 134*       LFTs -   Recent Labs   Lab Test 10/23/19  0322 10/22/19  1314 10/21/19  1451   ALKPHOS 174* 181* 194*   BILITOTAL 0.3 0.4 0.3   ALT 13 18 16   AST 12 19 18   PROTTOTAL 6.3* 6.7* 7.2   ALBUMIN 2.6* 2.7* 3.0*       Iron Panel -   Recent Labs   Lab Test 12/13/18  1033 08/01/18  0921  05/08/18  0709   IRON 16* 93  --  48   IRONSAT 7* 37  --  18   YOLA 302* 571*   < >  --     < > = values in this interval not displayed.         Imaging:  All imaging studies reviewed by me.       Current Medications:    basiliximab (SIMULECT) infusion  20 mg Intravenous Once     calcium carbonate 600 mg-vitamin D 400 units  1 tablet Oral Daily     ferrous sulfate  325 mg Oral Daily with breakfast     heparin ANTICOAGULANT  5,000 Units Subcutaneous Q12H     melatonin  3 mg Oral At Bedtime     mirtazapine  15 mg Oral At Bedtime     multivitamin w/minerals  1 tablet Oral Daily     pantoprazole  40 mg Oral BID     predniSONE  5 mg Oral QAM    And "     predniSONE  2.5 mg Oral QPM     sodium chloride (PF)  3 mL Intracatheter Q8H     tacrolimus  1 mg Oral BID IS     voriconazole  200 mg Oral Q12H       Yenni Gamble MD

## 2019-10-25 ENCOUNTER — APPOINTMENT (OUTPATIENT)
Dept: INTERVENTIONAL RADIOLOGY/VASCULAR | Facility: CLINIC | Age: 59
DRG: 682 | End: 2019-10-25
Attending: PHYSICIAN ASSISTANT
Payer: COMMERCIAL

## 2019-10-25 LAB
ANION GAP SERPL CALCULATED.3IONS-SCNC: 11 MMOL/L (ref 3–14)
BUN SERPL-MCNC: 66 MG/DL (ref 7–30)
CALCIUM SERPL-MCNC: 8.3 MG/DL (ref 8.5–10.1)
CHLORIDE SERPL-SCNC: 112 MMOL/L (ref 94–109)
CMV DNA SPEC NAA+PROBE-ACNC: NORMAL [IU]/ML
CMV DNA SPEC NAA+PROBE-LOG#: NORMAL {LOG_IU}/ML
CO2 SERPL-SCNC: 14 MMOL/L (ref 20–32)
COPATH REPORT: NORMAL
CREAT SERPL-MCNC: 6.16 MG/DL (ref 0.52–1.04)
GFR SERPL CREATININE-BSD FRML MDRD: 7 ML/MIN/{1.73_M2}
GLUCOSE SERPL-MCNC: 131 MG/DL (ref 70–99)
INTERPRETATION ECG - MUSE: NORMAL
POTASSIUM SERPL-SCNC: 4.8 MMOL/L (ref 3.4–5.3)
SODIUM SERPL-SCNC: 137 MMOL/L (ref 133–144)
SPECIMEN SOURCE: NORMAL

## 2019-10-25 PROCEDURE — 93010 ELECTROCARDIOGRAM REPORT: CPT | Mod: ZP | Performed by: INTERNAL MEDICINE

## 2019-10-25 PROCEDURE — 25000131 ZZH RX MED GY IP 250 OP 636 PS 637: Performed by: PHYSICIAN ASSISTANT

## 2019-10-25 PROCEDURE — 86481 TB AG RESPONSE T-CELL SUSP: CPT | Performed by: HOSPITALIST

## 2019-10-25 PROCEDURE — C1750 CATH, HEMODIALYSIS,LONG-TERM: HCPCS

## 2019-10-25 PROCEDURE — 25000132 ZZH RX MED GY IP 250 OP 250 PS 637: Performed by: PHYSICIAN ASSISTANT

## 2019-10-25 PROCEDURE — 5A1D70Z PERFORMANCE OF URINARY FILTRATION, INTERMITTENT, LESS THAN 6 HOURS PER DAY: ICD-10-PCS | Performed by: INTERNAL MEDICINE

## 2019-10-25 PROCEDURE — 27210732 ZZH ACCESSORY CR1

## 2019-10-25 PROCEDURE — 93005 ELECTROCARDIOGRAM TRACING: CPT

## 2019-10-25 PROCEDURE — C1769 GUIDE WIRE: HCPCS

## 2019-10-25 PROCEDURE — 27210908 ZZH NEEDLE CR4

## 2019-10-25 PROCEDURE — 99232 SBSQ HOSP IP/OBS MODERATE 35: CPT | Performed by: HOSPITALIST

## 2019-10-25 PROCEDURE — 27211193 ZZ H WOUND GLUE CR1

## 2019-10-25 PROCEDURE — 87340 HEPATITIS B SURFACE AG IA: CPT | Performed by: INTERNAL MEDICINE

## 2019-10-25 PROCEDURE — 40000987 ZZH STATISTIC CONSCIOUS SEDATION < 10 MINUTES

## 2019-10-25 PROCEDURE — 99153 MOD SED SAME PHYS/QHP EA: CPT

## 2019-10-25 PROCEDURE — 90937 HEMODIALYSIS REPEATED EVAL: CPT

## 2019-10-25 PROCEDURE — 25000128 H RX IP 250 OP 636: Performed by: PHYSICIAN ASSISTANT

## 2019-10-25 PROCEDURE — 27210904 ZZH KIT CR6

## 2019-10-25 PROCEDURE — 25000131 ZZH RX MED GY IP 250 OP 636 PS 637: Performed by: NURSE PRACTITIONER

## 2019-10-25 PROCEDURE — 25000125 ZZHC RX 250: Performed by: STUDENT IN AN ORGANIZED HEALTH CARE EDUCATION/TRAINING PROGRAM

## 2019-10-25 PROCEDURE — 25000125 ZZHC RX 250: Performed by: NURSE PRACTITIONER

## 2019-10-25 PROCEDURE — 86706 HEP B SURFACE ANTIBODY: CPT | Performed by: INTERNAL MEDICINE

## 2019-10-25 PROCEDURE — 02H633Z INSERTION OF INFUSION DEVICE INTO RIGHT ATRIUM, PERCUTANEOUS APPROACH: ICD-10-PCS | Performed by: RADIOLOGY

## 2019-10-25 PROCEDURE — 36415 COLL VENOUS BLD VENIPUNCTURE: CPT | Performed by: HOSPITALIST

## 2019-10-25 PROCEDURE — 25000128 H RX IP 250 OP 636: Performed by: INTERNAL MEDICINE

## 2019-10-25 PROCEDURE — 36558 INSERT TUNNELED CV CATH: CPT | Mod: LT

## 2019-10-25 PROCEDURE — 99152 MOD SED SAME PHYS/QHP 5/>YRS: CPT

## 2019-10-25 PROCEDURE — 76937 US GUIDE VASCULAR ACCESS: CPT

## 2019-10-25 PROCEDURE — 12000001 ZZH R&B MED SURG/OB UMMC

## 2019-10-25 PROCEDURE — 25000132 ZZH RX MED GY IP 250 OP 250 PS 637: Performed by: HOSPITALIST

## 2019-10-25 PROCEDURE — 25000132 ZZH RX MED GY IP 250 OP 250 PS 637: Performed by: NURSE PRACTITIONER

## 2019-10-25 PROCEDURE — 25000128 H RX IP 250 OP 636: Performed by: STUDENT IN AN ORGANIZED HEALTH CARE EDUCATION/TRAINING PROGRAM

## 2019-10-25 PROCEDURE — 25800030 ZZH RX IP 258 OP 636: Performed by: PHYSICIAN ASSISTANT

## 2019-10-25 PROCEDURE — 80048 BASIC METABOLIC PNL TOTAL CA: CPT | Performed by: HOSPITALIST

## 2019-10-25 RX ORDER — NICOTINE POLACRILEX 4 MG
15-30 LOZENGE BUCCAL
Status: DISCONTINUED | OUTPATIENT
Start: 2019-10-25 | End: 2019-10-29 | Stop reason: HOSPADM

## 2019-10-25 RX ORDER — DEXTROSE MONOHYDRATE 25 G/50ML
25-50 INJECTION, SOLUTION INTRAVENOUS
Status: DISCONTINUED | OUTPATIENT
Start: 2019-10-25 | End: 2019-10-29 | Stop reason: HOSPADM

## 2019-10-25 RX ORDER — CARVEDILOL 25 MG/1
25 TABLET ORAL 2 TIMES DAILY WITH MEALS
Status: DISCONTINUED | OUTPATIENT
Start: 2019-10-25 | End: 2019-10-29 | Stop reason: HOSPADM

## 2019-10-25 RX ORDER — METOPROLOL TARTRATE 1 MG/ML
5 INJECTION, SOLUTION INTRAVENOUS ONCE
Status: COMPLETED | OUTPATIENT
Start: 2019-10-25 | End: 2019-10-25

## 2019-10-25 RX ORDER — HEPARIN SODIUM 1000 [USP'U]/ML
3 INJECTION, SOLUTION INTRAVENOUS; SUBCUTANEOUS ONCE
Status: DISCONTINUED | OUTPATIENT
Start: 2019-10-25 | End: 2019-10-29

## 2019-10-25 RX ORDER — FENTANYL CITRATE 50 UG/ML
25-50 INJECTION, SOLUTION INTRAMUSCULAR; INTRAVENOUS EVERY 5 MIN PRN
Status: DISCONTINUED | OUTPATIENT
Start: 2019-10-25 | End: 2019-10-25 | Stop reason: HOSPADM

## 2019-10-25 RX ORDER — NALOXONE HYDROCHLORIDE 0.4 MG/ML
.1-.4 INJECTION, SOLUTION INTRAMUSCULAR; INTRAVENOUS; SUBCUTANEOUS
Status: DISCONTINUED | OUTPATIENT
Start: 2019-10-25 | End: 2019-10-25 | Stop reason: HOSPADM

## 2019-10-25 RX ORDER — HEPARIN SODIUM 1000 [USP'U]/ML
3 INJECTION, SOLUTION INTRAVENOUS; SUBCUTANEOUS ONCE
Status: COMPLETED | OUTPATIENT
Start: 2019-10-25 | End: 2019-10-25

## 2019-10-25 RX ORDER — FLUMAZENIL 0.1 MG/ML
0.2 INJECTION, SOLUTION INTRAVENOUS
Status: DISCONTINUED | OUTPATIENT
Start: 2019-10-25 | End: 2019-10-25 | Stop reason: HOSPADM

## 2019-10-25 RX ADMIN — SODIUM CHLORIDE: 9 INJECTION, SOLUTION INTRAVENOUS at 00:12

## 2019-10-25 RX ADMIN — PREDNISONE 5 MG: 5 TABLET ORAL at 08:18

## 2019-10-25 RX ADMIN — ONDANSETRON 4 MG: 2 INJECTION INTRAMUSCULAR; INTRAVENOUS at 10:56

## 2019-10-25 RX ADMIN — TACROLIMUS 1 MG: 1 CAPSULE ORAL at 00:13

## 2019-10-25 RX ADMIN — HEPARIN SODIUM 3000 UNITS: 1000 INJECTION, SOLUTION INTRAVENOUS; SUBCUTANEOUS at 12:34

## 2019-10-25 RX ADMIN — FENTANYL CITRATE 25 MCG: 50 INJECTION, SOLUTION INTRAMUSCULAR; INTRAVENOUS at 11:57

## 2019-10-25 RX ADMIN — MELATONIN TAB 3 MG 3 MG: 3 TAB at 22:17

## 2019-10-25 RX ADMIN — FENTANYL CITRATE 25 MCG: 50 INJECTION, SOLUTION INTRAMUSCULAR; INTRAVENOUS at 12:18

## 2019-10-25 RX ADMIN — TACROLIMUS 1 MG: 1 CAPSULE ORAL at 20:21

## 2019-10-25 RX ADMIN — MULTIPLE VITAMINS W/ MINERALS TAB 1 TABLET: TAB at 08:18

## 2019-10-25 RX ADMIN — SODIUM CHLORIDE 300 ML: 9 INJECTION, SOLUTION INTRAVENOUS at 17:19

## 2019-10-25 RX ADMIN — PANTOPRAZOLE SODIUM 40 MG: 40 TABLET, DELAYED RELEASE ORAL at 08:18

## 2019-10-25 RX ADMIN — TACROLIMUS 1 MG: 1 CAPSULE ORAL at 08:18

## 2019-10-25 RX ADMIN — PREDNISONE 2.5 MG: 2.5 TABLET ORAL at 20:22

## 2019-10-25 RX ADMIN — METOPROLOL TARTRATE 5 MG: 5 INJECTION, SOLUTION INTRAVENOUS at 20:21

## 2019-10-25 RX ADMIN — LIDOCAINE HYDROCHLORIDE 30 ML: 10 INJECTION, SOLUTION EPIDURAL; INFILTRATION; INTRACAUDAL; PERINEURAL at 12:31

## 2019-10-25 RX ADMIN — MIDAZOLAM 1 MG: 1 INJECTION INTRAMUSCULAR; INTRAVENOUS at 12:08

## 2019-10-25 RX ADMIN — Medication 1 TABLET: at 08:18

## 2019-10-25 RX ADMIN — FERROUS SULFATE TAB 325 MG (65 MG ELEMENTAL FE) 325 MG: 325 (65 FE) TAB at 08:18

## 2019-10-25 RX ADMIN — SODIUM CHLORIDE 250 ML: 9 INJECTION, SOLUTION INTRAVENOUS at 17:20

## 2019-10-25 RX ADMIN — VORICONAZOLE 200 MG: 200 TABLET, FILM COATED ORAL at 08:18

## 2019-10-25 RX ADMIN — MIDAZOLAM 1 MG: 1 INJECTION INTRAMUSCULAR; INTRAVENOUS at 11:56

## 2019-10-25 RX ADMIN — VORICONAZOLE 200 MG: 200 TABLET, FILM COATED ORAL at 20:22

## 2019-10-25 RX ADMIN — Medication: at 17:20

## 2019-10-25 RX ADMIN — MIRTAZAPINE 15 MG: 15 TABLET, FILM COATED ORAL at 22:17

## 2019-10-25 RX ADMIN — FENTANYL CITRATE 50 MCG: 50 INJECTION, SOLUTION INTRAMUSCULAR; INTRAVENOUS at 12:08

## 2019-10-25 RX ADMIN — CARVEDILOL 25 MG: 25 TABLET, FILM COATED ORAL at 20:22

## 2019-10-25 RX ADMIN — PANTOPRAZOLE SODIUM 40 MG: 40 TABLET, DELAYED RELEASE ORAL at 20:22

## 2019-10-25 ASSESSMENT — MIFFLIN-ST. JEOR: SCORE: 1172.84

## 2019-10-25 ASSESSMENT — ACTIVITIES OF DAILY LIVING (ADL)
ADLS_ACUITY_SCORE: 11

## 2019-10-25 NOTE — PROGRESS NOTES
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  She was admitted 10/21 with abdominal bloating and increased shortness of breath, s/p paracentesis (10/22) for 2L fluid removal with rapid reaccumulation and need for repeat paracentesis (10/24).     Today's recommendations:  - Recommend continued diuresis  - Tacrolimus levels ordered 10/26-10/28  - Basiliximab redose 10/28 if still subtherapeutic  - Weekly CMV, next due 10/31  - EBV level pending  - Ascites labs pending, including leukemia lymphoma evaluation  - Recommend deferring right heart cath until volume status improves      S/p bilateral sequential lung transplant (BSLT): Reports increased dyspnea, likely due to pressure on her diaphragm from the recurrent ascites and also possibly from metabolic acidosis from renal dysfunction. No hypoxia, nonproductive congested cough.  CT scan (10/21) with small bilateral pleural effusions).  CXR (10/23) with increase in bilateral pleural effusions R>L as compared with 10/6.  - Volume and renal management per primary and nephrology teams, recommend diuresis     Immunosuppression: On 2-drug IST for recurrent CMV and leukpenia  - Tacrolimus 1 mg BID (dose held on admission for supratherapeutic level, resumed 10/24). Goal 8-10.  Closely monitor tacro levels (ordered 10/26-10/28).  S/p basiliximab 10/24, will redose 10/28 if still subtherapeutic (not ordered).  - Prednisone 5 mg qAM / 2.5 mg  qPM     CMV reactivation: CMV D+/R+. First positive level 7/9/18, bronch CMV as high as 3.1 million (8/2/18).  Valgancyclovir therapy stopped 7/25.  CMV level has been transiently elevated, but generally has remained low and <137 since 9/24.  - CMV weekly, next due 10/31 (not ordered)     Aspergillus empyema: Aspergillus noted in pleural space 10/8.  Has been on voriconazole course since 10/10, LFTs generally stable save for low level alk phos.  F/u EKG after 1 week with QTc 469.  Voriconazole level therapeutic on 10/24.  - Continue voriconazole 200 mg BID, minimum 3 months  - Recommend repeat chest CT this admission     Other problems managed by the primary team:     Ascites: Abdominal CT (10/21) with moderate ascites/anasarca significantly increased from 9/12 and 10/1, gallbladder wall thickening with unchanged cholelithiasis soft tissue density in the medial left costophrenic angle, mild hepatosplenomegaly, and with mild wall thickening in the cecum, ascending colon and splenic flexure.  Abdominal US (10/22) with coarse hepatic echotexture, moderate ascites patent hepatic and portal vessels circumferential gallbladder wall thickening possible sludge versus gallbladder polyp.  Alkaline phosphatase is mildly elevated, remaining liver panel normal.  Ascites is not likely entirely d/t renal failure (see below), particularly since the CT reading suggests there may have been some ascites on previous September and October imaging.  S/p paracentesis 10/22 and repeated 10/24 d/t rapid reaccumulation.  Hepatology consulted 10/24 (see their note), suggest chronic liver disease is likely 2/2 chronically increased right-sided cardiac pressures with resultant passive congestive hepatopathy and hepatic fibrosis.  Some evidence of renal recovery, as creatinine seems to have plateaued and urine output is improving steadily.  - EBV level (10/24) pending  - Ascites labs pending (10/22 and 10/24) including leukemia lymphoma  evaluation (10/24)  - Agree with right heart cath, but would defer until patient is euvolemic  - Management per primary team     Acute on chronic kidney disease: The patient had stage III CKD but appears to have RADHA on admission, possibly d/t supratherapeutic tacrolimus level on admission (31.6, true trough).  It has probably been gradually increasing since the addition of voriconazole at her last visit.  Prior tacrolimus level was 5.3 on 10/10 and she was already developing ascites by 10/13, which seems a little too fast for ascites due to renal failure due to tacrolimus.  Although this is the most likely cause, a broader differential should be maintained.   - Plan for tunneled dialysis line and initiation of HD today  - Management per nephrology and primary teams     We appreciate the excellent care provided by the [place] [id number] team. Recommendations communicated via in person rounding and this note. Will continue to follow along closely, please do not hesitate to call with any questions or concerns.     Patient discussed with Dr. Macias.    Hina Huff, DNP, APRN, CNP  Inpatient Nurse Practitioner  Pulmonary CF/Transplant  Pager [phone]     Subjective & Interval History:     Paracentesis yesterday with 2.5L fluid removal, sent for further evaluation.  Increase in abdominal discomfort following procedure, worse with cough, resolved with GI cocktail and time.  Abdominal symptoms greatly improved this morning.  Also less DESHPANDE, though admittedly hasn't moved around much this morning.  Does continue to have frequent loose stools, about 8 times yesterday.    Review of Systems:     C: no fever, no chills, no change in weight, no change in appetite  INTEGUMENTARY/SKIN: no rash or obvious new lesions  ENT/MOUTH: no sore throat, no sinus pain, no nasal congestion or drainage  RESP: see interval history  CV: no chest pain, no palpitations, + peripheral edema, no orthopnea  GI: see interval history  : +  "oliguria  MUSCULOSKELETAL: no myalgias, no arthralgias  ENDOCRINE: blood sugars with adequate control  NEURO: no headache, no numbness or tingling  PSYCHIATRIC: mood stable    Physical Exam:     Vital signs:  Temp: 97.1  F (36.2  C) Temp src: Oral BP: 128/79 Pulse: 84 Heart Rate: 83 Resp: 15 SpO2: 98 % O2 Device: None (Room air) Oxygen Delivery: 1/2 LPM Height: 162.6 cm (5' 4\") Weight: 59.8 kg (131 lb 12.8 oz)  I/O:     Intake/Output Summary (Last 24 hours) at 10/25/2019 1301  Last data filed at 10/25/2019 0904  Gross per 24 hour   Intake 1055 ml   Output 275 ml   Net 780 ml     Constitutional: Lying in bed, in no apparent distress.   HEENT: Eyes with pink conjunctivae, anicteric. Oral mucosa moist without lesions.   PULM: Very diminished bases. LLL with coarse crackles, no rhonchi, no wheezes. Non-labored breathing on RA.  CV: Normal S1 and S2. RRR. No murmur, gallop, or rub. 1+ BLE edema.   ABD: NABS, mildly firm and distended, mild tenderness.    MSK: Moves all extremities. No apparent muscle wasting.   NEURO: Alert and oriented, conversant.   SKIN: Warm, dry. No rash on limited exam.   PSYCH: Mood stable.    Lines, Drains, and Devices:  Peripheral IV 10/21/19 Left Lower forearm (Active)   Site Assessment WDL 10/25/2019  8:15 AM   Line Status Infusing 10/25/2019  8:15 AM   Phlebitis Scale 0-->no symptoms 10/25/2019  8:15 AM   Infiltration Scale 0 10/25/2019  8:15 AM   Infiltration Site Treatment Method  None 10/25/2019 12:00 AM   Extravasation? No 10/25/2019 12:00 AM   Number of days: 4       CVC Double Lumen 10/25/19 Right Internal jugular (Active)   Dressing Intervention Chlorhexidine sponge;Transparent 10/21/2019 12:00 AM   Number of days: 0     Data:     LABS    CMP:   Recent Labs   Lab 10/25/19  0733 10/24/19  2032 10/24/19  0557 10/23/19  2112 10/23/19  0322 10/22/19  1314 10/21/19  1752 10/21/19  1451    136 132*  --  135 133 133 132*   POTASSIUM 4.8 4.6 4.6 4.8 4.8 4.5 5.0 4.5   CHLORIDE 112* 110* " 105  --  109 109 109 107   CO2 14* 14* 13*  --  11* 13* 10* 14*   ANIONGAP 11 12 14  --  15* 12 13 12   * 118* 89  --  139* 127* 138* 111*   BUN 66* 69* 71*  --  70* 66* 66* 67*   CR 6.16* 6.37* 6.60*  --  6.62* 6.18* 5.76* 5.90*   GFRESTIMATED 7* 7* 6*  --  6* 7* 8* 7*   GFRESTBLACK 8* 8* 7*  --  7* 8* 9* 8*   CHELSEY 8.3* 8.2* 8.8  --  8.0* 8.2* 8.0* 8.5   MAG  --   --   --   --  2.0 2.1 2.0  --    PHOS  --   --   --   --  7.4* 7.3* 6.7*  --    PROTTOTAL  --  6.9  --   --  6.3* 6.7*  --  7.2   ALBUMIN  --  2.6*  --   --  2.6* 2.7*  --  3.0*   BILITOTAL  --  0.2  --   --  0.3 0.4  --  0.3   ALKPHOS  --  227*  --   --  174* 181*  --  194*   AST  --  21  --   --  12 19  --  18   ALT  --  13  --   --  13 18  --  16     CBC:   Recent Labs   Lab 10/24/19  2032 10/23/19  0322 10/22/19  1314 10/21/19  1451   WBC 9.1 5.9 5.3 6.3   RBC 3.98 3.27* 3.18* 3.34*   HGB 11.3* 9.1* 9.0* 9.4*   HCT 37.3 30.4* 29.7* 31.4*   MCV 94 93 93 94   MCH 28.4 27.8 28.3 28.1   MCHC 30.3* 29.9* 30.3* 29.9*   RDW 19.9* 19.6* 19.5* 19.4*    140* 142* 134*       INR:   Recent Labs   Lab 10/22/19  1314   INR 1.12       Glucose:   Recent Labs   Lab 10/25/19  0733 10/24/19  2032 10/24/19  0557 10/23/19  0322 10/22/19  1314 10/21/19  1752   * 118* 89 139* 127* 138*       Blood Gas:   Recent Labs   Lab 10/21/19  1459   PHV 7.25*   PCO2V 29*   PO2V 35   HCO3V 13*       Culture Data   Recent Labs   Lab 10/22/19  1430   CULT Culture negative monitoring continues       Virology Data:   Lab Results   Component Value Date    FLUAH1 Negative 10/07/2019    FLUAH3 Negative 10/07/2019    [id number] Negative 10/07/2019    IFLUB Negative 10/07/2019    RSVA Negative 10/07/2019    RSVB Negative 10/07/2019    PIV1 Negative 10/07/2019    PIV2 Negative 10/07/2019    PIV3 Negative 10/07/2019    HMPV Negative 10/07/2019    HRVS Negative 10/07/2019    ADVBE Negative 10/07/2019    ADVC Negative 10/07/2019    ADVC Negative 03/07/2019    ADVC Negative  01/17/2019       Historical CMV results (last 3 of prior testing):  Lab Results   Component Value Date    CMVQNT CMV DNA Not Detected 10/24/2019    CMVQNT CMV DNA Not Detected 10/06/2019    CMVQNT <137 (A) 09/12/2019     Lab Results   Component Value Date    CMVLOG Not Calculated 10/24/2019    CMVLOG Not Calculated 10/06/2019    CMVLOG <2.1 09/12/2019       Urine Studies    Recent Labs   Lab Test 10/21/19  2240 09/12/19  0125   URINEPH 5.0 7.5*   NITRITE Negative Negative   LEUKEST Large* Negative   WBCU 115* 3       Most Recent [place] Pulmonary Function Testing (FVC/FEV1 only)  FVC-Pre   Date Value Ref Range Status   08/07/2019 2.22 L    06/05/2019 2.26 L    03/05/2019 1.97 L    01/16/2019 1.92 L      FVC-%Pred-Pre   Date Value Ref Range Status   08/07/2019 67 %    06/05/2019 70 %    03/05/2019 60 %    01/16/2019 59 %      FEV1-Pre   Date Value Ref Range Status   08/07/2019 1.60 L    06/05/2019 1.65 L    03/05/2019 1.31 L    01/16/2019 1.04 L      FEV1-%Pred-Pre   Date Value Ref Range Status   08/07/2019 61 %    06/05/2019 64 %    03/05/2019 51 %    01/16/2019 40 %        IMAGING    Recent Results (from the past 48 hour(s))   XR Abdomen Port 1 View    Narrative    Exam: XR ABDOMEN PORT 1 VW, 10/24/2019 8:17 PM    Indication: epigastric pain - obtain upright film    Comparison: None available    Findings:   Single upright frontal view of the abdomen. No abnormally dilated  loops of small bowel or colon. No free air in the abdomen. Median  sternotomy wires. Bibasilar atelectasis and pleural effusion.      Impression    Impression: No free air in the abdomen identified.    I have personally reviewed the examination and initial interpretation  and I agree with the findings.    ADAM GONZALEZ MD

## 2019-10-25 NOTE — PROGRESS NOTES
"[facility]    Hepatology Follow-up    CC: Abdominal distension    Dx: ILD s/p bilateral lung transplant   Portal hypertension   Volume overload   RADHA on CKD likely from CNI toxicity    24 hour events:   No new issues    Subjective:  Feels much better after removal of 4.5 L in the last 3 days.  Getting tunneled Line today for HD  Patient denies fevers, sweats or chills.    Medications  Current Facility-Administered Medications   Medication Dose Route Frequency     calcium carbonate 600 mg-vitamin D 400 units  1 tablet Oral Daily     ceFAZolin  2 g Intravenous Pre-Op/Pre-procedure x 1 dose     ferrous sulfate  325 mg Oral Daily with breakfast     melatonin  3 mg Oral At Bedtime     mirtazapine  15 mg Oral At Bedtime     multivitamin w/minerals  1 tablet Oral Daily     pantoprazole  40 mg Oral BID     predniSONE  5 mg Oral QAM    And     predniSONE  2.5 mg Oral QPM     sodium chloride (PF)  3 mL Intracatheter Q8H     tacrolimus  1 mg Oral BID IS     voriconazole  200 mg Oral Q12H       Review of systems  A 10-point review of systems was negative, unless stated above    Examination  /83 (BP Location: Right arm)   Pulse 84   Temp 97.1  F (36.2  C) (Oral)   Resp 16   Ht 1.626 m (5' 4\")   Wt 59.8 kg (131 lb 12.8 oz)   LMP 06/07/2014 (Exact Date)   SpO2 97%   BMI 22.62 kg/m      Intake/Output Summary (Last 24 hours) at 10/25/2019 1119  Last data filed at 10/25/2019 0904  Gross per 24 hour   Intake 1055 ml   Output 275 ml   Net 780 ml       General: Not in distress  CVS: RRR  Resp: CTA  Abdomen: distended, soft  Extremities: edema  Neuro: AAO X 3, asterixis absent    Laboratory  Lab Results   Component Value Date     10/25/2019    POTASSIUM 4.8 10/25/2019    CHLORIDE 112 10/25/2019    CO2 14 10/25/2019    BUN 66 10/25/2019    CR 6.16 10/25/2019       Lab Results   Component Value Date    BILITOTAL 0.2 10/24/2019    ALT 13 10/24/2019    AST 21 10/24/2019    ALKPHOS 227 " 10/24/2019       Lab Results   Component Value Date    WBC 9.1 10/24/2019    HGB 11.3 10/24/2019    MCV 94 10/24/2019     10/24/2019       Lab Results   Component Value Date    INR 1.12 10/22/2019       MELD-Na score: 22 at 10/24/2019  8:32 PM  MELD score: 21 at 10/24/2019  8:32 PM  Calculated from:  Serum Creatinine: 6.37 mg/dL (Rounded to 4 mg/dL) at 10/24/2019  8:32 PM  Serum Sodium: 136 mmol/L at 10/24/2019  8:32 PM  Total Bilirubin: 0.2 mg/dL (Rounded to 1 mg/dL) at 10/24/2019  8:32 PM  INR(ratio): 1.12 at 10/22/2019  1:14 PM  Age: 58 years        Assessment  58 year old female with anti-synthetase syndrome s/p bilateral ling transplant 03/2018, complicated by Aspergillus empyema on Voriconazole; admitted into the hospital for abdominal distension and dyspnea. As previously documented, fluid analysis is consistent with portal hypertension as etiology (with SAAG >1.1 and protein <2.5). Likely chronic congestion from prior severe right sided cardiac dysfunction and pulmonary hypertension.  Other considerations include cirrhosis from NAFLD, AIH or NRH.   Currently planned for HD.       Recommendations  -- Check F-Actin, IgG, AMA  -- Discuss with Cardiology about repeating a right heart cath  -- May need a transjugular liver biopsy in the future (NRH vs cirrhosis)  -- Appreciate Nephrology assistance in management.    Hepatology will sign off. Please call if there are any questions or concerns    Patient seen and discussed with attending physician, Dr. Varun Mas MD  PGY 5  Hepatology      The patient was seen and examined.  The above assessment and plan was developed jointly with the fellow.      Emanuel Bond MD

## 2019-10-25 NOTE — PLAN OF CARE
Pt A&O, VSS on RA. DESHPANDE. Pt having significant discomfort due to abd distention. No appetite. Edema noted on bilateral flanks and BLEs. Oliguric. Pt reports 8 loose BMs today. Paracentesis site to RLQ CDI. Labs pending from para today. L PIV SL. Creat 6.37. Restarted Tacro today. Epigastric pain tonight controlled with GI cocktail. EKG showed RBBB, prolonged QT, and possible anterior ischemia. Trops negative. Abd xray negative. Zofran prn for nausea. Nephrology recs HD, IR placement of HD cath tomorrow. NPO at 0000.

## 2019-10-25 NOTE — PLAN OF CARE
VSS, pt is alert, orientated, up ind in room, denies pain, denies nausea, pt to first HD run this sharda, HD cath to right chest C,D,I, placed today

## 2019-10-25 NOTE — PLAN OF CARE
"/76 (BP Location: Right arm)   Pulse 84   Temp 97.4  F (36.3  C) (Oral)   Resp 16   Ht 1.626 m (5' 4\")   Wt 59.8 kg (131 lb 12.8 oz)   LMP 06/07/2014 (Exact Date)   SpO2 98%   BMI 22.62 kg/m      Time 2968-2672      Reason for admission: Acute kidney injury   Vitals: VSS on RA  Activity: Up Ind   Pain: Denies pain  Neuro: A&Ox4, speech logical  Mood/Behavior: calm, cooperative  Cardiac: RRR, no edema BLE  Respiratory: DESHPANDE, LS clear  GI/: No BM this shift, Oliguric  Diet: Dialysis diet  Lines: PIV   Skin: CDI  Labs/imaging: Cr 6.16      New changes this shift: Pt had dialysis catheter placed this afternoon. HD planned for around 1700.  at bedside all shift.      Plan: Continue to monitor and follow POC.           "

## 2019-10-25 NOTE — PROCEDURES
[facility], [place]    Procedure: IR Procedure Note  Date/Time: 10/25/2019 12:36 PM  Performed by: Tressa Bryant MD  Authorized by: Tressa Bryant MD   IR Fellow Physician: Tressa Bryant MD  Other(s) attending procedure: IR staff: Kel Bauhg MD    UNIVERSAL PROTOCOL   Site Marked: No  Prior Images Obtained and Reviewed:  Yes  Required items: Required blood products, implants, devices and special equipment available    Patient identity confirmed:  Verbally with patient  Patient was reevaluated immediately before administering moderate or deep sedation or anesthesia  Confirmation Checklist:  Patient's identity using two indicators, relevant allergies, procedure was appropriate and matched the consent or emergent situation and correct equipment/implants were available  Time out: Immediately prior to the procedure a time out was called    Preparation: Patient was prepped and draped in usual sterile fashion    ESBL (mL):  5     ANESTHESIA    Anesthesia: Local infiltration  Local Anesthetic:  Lidocaine 1% without epinephrine  Anesthetic Total (mL):  30      SEDATION    Patient Sedated: Yes    Sedation Type:  Moderate (conscious) sedation  Sedation:  Fentanyl and midazolam  Vital signs: Vital signs monitored during sedation    See dictated procedure note for full details.  Findings: 14.5 Bahamian 23 cm palindrome dialysis catheter, tip in right atrium.     Specimens: none    Complications: None    Condition: Stable    Plan: 1 hour bedrest.  Line can be used immediately.      PROCEDURE   Patient Tolerance:  Patient tolerated the procedure well with no immediate complications    Time of Sedation in Minutes by Physician:  30

## 2019-10-25 NOTE — PLAN OF CARE
[id number]. Vitals stable on room air. Alert and oriented. Independent. Denies pain overnight. NPO since midnight. NS running at 75ml. Patient will be getting catheter placed today in IR for dialysis-- unsure of time. Rufus wipes x1 completed.       No BM overnight. Patient denies nausea.     Patient slept well through night.     Will continue to monitor.     Elva Marx RN on 10/25/2019 at 6:16 AM

## 2019-10-25 NOTE — PROGRESS NOTES
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  Anruic RADHA likely related to FK level of 30, less likely AIN or GN (UA shows WBC/RBC and 30 albumin with SG 1.018).    - no improvement in kidney function  - remains oliguric in the last 24 hours WITH diuretic  - IR consult placed yesterday for tunneled catheter, will initiate HD today 2 hours, 200 ml/min BFR, 1-2 L UF     2. Hypervolemia - suboptimal response to IV bumex yesterday and had para for 2 liters yesterday  - 1-2 L UF today     3. Acidosis - will improve with HD today    4. Hyperphosphatemia - consider recheck of phos level    Yenni Gamble MD   [phone]    Interval History :   Nursing and provider notes from last 24 hours reviewed.  In the last 24 hours Kecia Blue had IV bumex (second dose this hospital stay) and UOP was again in the oliguric range, creatinine still high.  Had a lot of nausea yesterday, little better this am and though she is NPO for dialysis catheter she feels she could eat. * episodes of stool in last 24 hours.    Review of Systems:   I reviewed the following systems:  GI: poor appetite. no nausea or vomiting, + diarrhea (6 episodes).   Neuro:  no confusion  Constitutional:  no fever or chills  CV: improving dyspnea but has ongoing edema.  no chest pain.    Physical Exam:   I/O last 3 completed shifts:  In: 420 [P.O.:340; I.V.:80]  Out: 275 [Urine:275]   /79   Pulse 84   Temp 97.1  F (36.2  C) (Oral)   Resp 15   Ht 1.626 m (5' 4\")   Wt 59.8 kg (131 lb 12.8 oz)   LMP 06/07/2014 (Exact Date)   SpO2 98%   BMI 22.62 kg/m   "     GENERAL APPEARANCE: no distress  EYES:  no scleral icterus, pupils equal  HENT: mouth without ulcers or lesions  PULM: normal work of breathing, lung clear anteriorly  CV: RRR, no rub     -JVD not elevated     -edema 2+ at sacrum   GI: less distended      Labs:   All labs reviewed by me  Electrolytes/Renal -   Recent Labs   Lab Test 10/25/19  0733 10/24/19  2032 10/24/19  0557  10/23/19  0322 10/22/19  1314 10/21/19  1752    136 132*  --  135 133 133   POTASSIUM 4.8 4.6 4.6   < > 4.8 4.5 5.0   CHLORIDE 112* 110* 105  --  109 109 109   CO2 14* 14* 13*  --  11* 13* 10*   BUN 66* 69* 71*  --  70* 66* 66*   CR 6.16* 6.37* 6.60*  --  6.62* 6.18* 5.76*   * 118* 89  --  139* 127* 138*   CHELSEY 8.3* 8.2* 8.8  --  8.0* 8.2* 8.0*   MAG  --   --   --   --  2.0 2.1 2.0   PHOS  --   --   --   --  7.4* 7.3* 6.7*    < > = values in this interval not displayed.       CBC -   Recent Labs   Lab Test 10/24/19  2032 10/23/19  0322 10/22/19  1314   WBC 9.1 5.9 5.3   HGB 11.3* 9.1* 9.0*    140* 142*       LFTs -   Recent Labs   Lab Test 10/24/19  2032 10/23/19  0322 10/22/19  1314   ALKPHOS 227* 174* 181*   BILITOTAL 0.2 0.3 0.4   ALT 13 13 18   AST 21 12 19   PROTTOTAL 6.9 6.3* 6.7*   ALBUMIN 2.6* 2.6* 2.7*       Iron Panel -   Recent Labs   Lab Test 12/13/18  1033 08/01/18  0921  05/08/18  0709   IRON 16* 93  --  48   IRONSAT 7* 37  --  18   YOLA 302* 571*   < >  --     < > = values in this interval not displayed.         Imaging:  All imaging studies reviewed by me.       Current Medications:    sodium chloride 0.9%  250 mL Intravenous Once in dialysis     sodium chloride 0.9%  300 mL Hemodialysis Machine Once     calcium carbonate 600 mg-vitamin D 400 units  1 tablet Oral Daily     ferrous sulfate  325 mg Oral Daily with breakfast     gelatin absorbable  1 each Topical During Hemodialysis (from stock)     heparin Lock (1000 units/mL High concentration)  3 mL Intracatheter Once     heparin Lock (1000 units/mL High  concentration)  3 mL Intracatheter Once     melatonin  3 mg Oral At Bedtime     mirtazapine  15 mg Oral At Bedtime     multivitamin w/minerals  1 tablet Oral Daily     - MEDICATION INSTRUCTIONS -   Does not apply Once     pantoprazole  40 mg Oral BID     predniSONE  5 mg Oral QAM    And     predniSONE  2.5 mg Oral QPM     sodium chloride (PF)  3 mL Intracatheter Q8H     sodium chloride (PF)  9 mL Intracatheter During Hemodialysis (from stock)     sodium chloride (PF)  9 mL Intracatheter During Hemodialysis (from stock)     tacrolimus  1 mg Oral BID IS     voriconazole  200 mg Oral Q12H       Yenni Gamble MD

## 2019-10-25 NOTE — PROGRESS NOTES
Care Coordinator Progress Note    Admission Date/Time:  10/21/2019  Attending MD:  Chris Navarro*    Data  Chart reviewed, discussed with interdisciplinary team.   Patient was admitted for:    Acute kidney injury (H)  S/P lung transplant (H).    Concerns with insurance coverage for discharge needs: None.  Current Living Situation: Patient lives with spouse.  Support System: Supportive  Services Involved: Dialysis Services  Transportation at Discharge: Family or friend will provide  Transportation to Medical Appointments:   - Not applicable  Barriers to Discharge: medically stable, dialysis outpatient arranged    Coordination of Care and Referrals: Provided patient/family with options for Dialysis Services.        Assessment  RNCC notified by team patient will need outpatient dialysis. RNCC met with patient and  in room. RNCC role was introduced and discharge plans were discussed. Patient would like to go to [place] for outpatient dialysis and the only location is [facility].     RNCC called [facility] and learned they currently do no have any openings, however, could probably rearrange their schedule to make room for someone later next week. The  is not in the office, but RNCC faxed over patient demographics, H&P and nephrology notes to [facility] to be reviewed on Monday. Amarilys Rush is the intake contact (P: [phone] F: [phone]).    The next closest location is [place], however, this location is not preferred by patient. Patient would like to wait to make a referral to this location.     Plan  Anticipated Discharge Date:  TBD  Anticipated Discharge Plan:  Home with outpatient dialysis    Halie Kahn RN, A  Internal Medicine Care Coordinator  Phone: [phone] Pager: [phone]  [facility]     To contact Weekend RNCC, dial [phone] and enter job code [phone] at prompt.   This pager can not be contacted by text page or  outside line.

## 2019-10-25 NOTE — PRE-PROCEDURE
GENERAL PRE-PROCEDURE:   Procedure:  Tunneled large bore central venous catheter placement   Date/Time:  10/25/2019 11:30 AM    Written consent obtained?: Yes    Risks and benefits: Risks, benefits and alternatives were discussed    Consent given by:  Patient  Patient states understanding of procedure being performed: Yes    Patient's understanding of procedure matches consent: Yes    Procedure consent matches procedure scheduled: Yes    Expected level of sedation:  Moderate  Appropriately NPO:  Yes  ASA Class:  Class 2- mild systemic disease, no acute problems, no functional limitations  Mallampati  :  Grade 2- soft palate, base of uvula, tonsillar pillars, and portion of posterior pharyngeal wall visible  Lungs:  Lungs clear with good breath sounds bilaterally  Heart:  Normal heart sounds and rate  History & Physical reviewed:  History and physical reviewed and no updates needed  Statement of review:  I have reviewed the lab findings, diagnostic data, medications, and the plan for sedation

## 2019-10-25 NOTE — PROGRESS NOTES
Brief Medicine Note    Contacted by nursing regarding patient having epigastric pain, RN had concern for acid reflux. Did note that pain was worse with coughing. No chest pain. Patient did have a paracentesis today. Otherwise VSS.     Obtained labs including CBC, CMP, Lipase and XR abdomen. Gave GI cocktail and additional tums. ECG/trop ordered. Chronically elevated ALKP. Trop negative. ECG did show new RBBB. Discussed with with Cardiology who discussed no acute work-up at this time given patient history. Unclear what the etiology is per cards. Did have ECHO 10/22 with normal EF 60-65% w/o WMA. XR unremarkable.     Discussed with RN and patient's symptoms had improved wiith medications. She was resting comfortably.     Please page medicine with any additional concerns or if any worsening abdominal pain, chest pain or acute change in vital signs.       Adilia Saunders PA-C  Hospitalist Service  Pager [phone]

## 2019-10-26 LAB
ANION GAP SERPL CALCULATED.3IONS-SCNC: 10 MMOL/L (ref 3–14)
BUN SERPL-MCNC: 49 MG/DL (ref 7–30)
CALCIUM SERPL-MCNC: 8.1 MG/DL (ref 8.5–10.1)
CHLORIDE SERPL-SCNC: 111 MMOL/L (ref 94–109)
CO2 SERPL-SCNC: 19 MMOL/L (ref 20–32)
CREAT SERPL-MCNC: 4.8 MG/DL (ref 0.52–1.04)
ERYTHROCYTE [DISTWIDTH] IN BLOOD BY AUTOMATED COUNT: 19.9 % (ref 10–15)
GFR SERPL CREATININE-BSD FRML MDRD: 9 ML/MIN/{1.73_M2}
GLUCOSE SERPL-MCNC: 127 MG/DL (ref 70–99)
HBV SURFACE AB SERPL IA-ACNC: 1.72 M[IU]/ML
HBV SURFACE AG SERPL QL IA: NONREACTIVE
HCT VFR BLD AUTO: 34.1 % (ref 35–47)
HGB BLD-MCNC: 10.1 G/DL (ref 11.7–15.7)
MCH RBC QN AUTO: 28 PG (ref 26.5–33)
MCHC RBC AUTO-ENTMCNC: 29.6 G/DL (ref 31.5–36.5)
MCV RBC AUTO: 95 FL (ref 78–100)
MYCOBACTERIUM SPEC CULT: NORMAL
PLATELET # BLD AUTO: 104 10E9/L (ref 150–450)
POTASSIUM SERPL-SCNC: 4.2 MMOL/L (ref 3.4–5.3)
RBC # BLD AUTO: 3.61 10E12/L (ref 3.8–5.2)
SODIUM SERPL-SCNC: 140 MMOL/L (ref 133–144)
SPECIMEN SOURCE: NORMAL
TACROLIMUS BLD-MCNC: 12 UG/L (ref 5–15)
TME LAST DOSE: NORMAL H
WBC # BLD AUTO: 6.8 10E9/L (ref 4–11)

## 2019-10-26 PROCEDURE — 25000132 ZZH RX MED GY IP 250 OP 250 PS 637: Performed by: PHYSICIAN ASSISTANT

## 2019-10-26 PROCEDURE — 25000132 ZZH RX MED GY IP 250 OP 250 PS 637: Performed by: HOSPITALIST

## 2019-10-26 PROCEDURE — 36415 COLL VENOUS BLD VENIPUNCTURE: CPT | Performed by: HOSPITALIST

## 2019-10-26 PROCEDURE — 80197 ASSAY OF TACROLIMUS: CPT | Performed by: NURSE PRACTITIONER

## 2019-10-26 PROCEDURE — 25000131 ZZH RX MED GY IP 250 OP 636 PS 637: Performed by: NURSE PRACTITIONER

## 2019-10-26 PROCEDURE — 99232 SBSQ HOSP IP/OBS MODERATE 35: CPT | Performed by: HOSPITALIST

## 2019-10-26 PROCEDURE — 83516 IMMUNOASSAY NONANTIBODY: CPT | Performed by: HOSPITALIST

## 2019-10-26 PROCEDURE — 85027 COMPLETE CBC AUTOMATED: CPT | Performed by: HOSPITALIST

## 2019-10-26 PROCEDURE — 90937 HEMODIALYSIS REPEATED EVAL: CPT

## 2019-10-26 PROCEDURE — 25000131 ZZH RX MED GY IP 250 OP 636 PS 637: Performed by: PHYSICIAN ASSISTANT

## 2019-10-26 PROCEDURE — 80048 BASIC METABOLIC PNL TOTAL CA: CPT | Performed by: HOSPITALIST

## 2019-10-26 PROCEDURE — 25000132 ZZH RX MED GY IP 250 OP 250 PS 637: Performed by: NURSE PRACTITIONER

## 2019-10-26 PROCEDURE — 93005 ELECTROCARDIOGRAM TRACING: CPT

## 2019-10-26 PROCEDURE — 12000001 ZZH R&B MED SURG/OB UMMC

## 2019-10-26 PROCEDURE — 25000128 H RX IP 250 OP 636: Performed by: INTERNAL MEDICINE

## 2019-10-26 PROCEDURE — 93010 ELECTROCARDIOGRAM REPORT: CPT | Performed by: INTERNAL MEDICINE

## 2019-10-26 RX ORDER — TACROLIMUS 1 MG/1
1 CAPSULE ORAL
Status: DISCONTINUED | OUTPATIENT
Start: 2019-10-27 | End: 2019-10-29 | Stop reason: HOSPADM

## 2019-10-26 RX ORDER — TACROLIMUS 0.5 MG/1
0.5 CAPSULE ORAL
Status: DISCONTINUED | OUTPATIENT
Start: 2019-10-26 | End: 2019-10-29 | Stop reason: HOSPADM

## 2019-10-26 RX ADMIN — MIRTAZAPINE 15 MG: 15 TABLET, FILM COATED ORAL at 22:00

## 2019-10-26 RX ADMIN — PREDNISONE 2.5 MG: 2.5 TABLET ORAL at 20:38

## 2019-10-26 RX ADMIN — PANTOPRAZOLE SODIUM 40 MG: 40 TABLET, DELAYED RELEASE ORAL at 20:38

## 2019-10-26 RX ADMIN — CARVEDILOL 25 MG: 25 TABLET, FILM COATED ORAL at 11:16

## 2019-10-26 RX ADMIN — CARVEDILOL 25 MG: 25 TABLET, FILM COATED ORAL at 17:53

## 2019-10-26 RX ADMIN — MELATONIN TAB 3 MG 3 MG: 3 TAB at 22:00

## 2019-10-26 RX ADMIN — FERROUS SULFATE TAB 325 MG (65 MG ELEMENTAL FE) 325 MG: 325 (65 FE) TAB at 11:16

## 2019-10-26 RX ADMIN — Medication 1 TABLET: at 11:17

## 2019-10-26 RX ADMIN — Medication: at 08:33

## 2019-10-26 RX ADMIN — PANTOPRAZOLE SODIUM 40 MG: 40 TABLET, DELAYED RELEASE ORAL at 11:17

## 2019-10-26 RX ADMIN — SODIUM CHLORIDE 250 ML: 9 INJECTION, SOLUTION INTRAVENOUS at 08:33

## 2019-10-26 RX ADMIN — VORICONAZOLE 200 MG: 200 TABLET, FILM COATED ORAL at 20:38

## 2019-10-26 RX ADMIN — TACROLIMUS 1 MG: 1 CAPSULE ORAL at 11:17

## 2019-10-26 RX ADMIN — VORICONAZOLE 200 MG: 200 TABLET, FILM COATED ORAL at 11:16

## 2019-10-26 RX ADMIN — MULTIPLE VITAMINS W/ MINERALS TAB 1 TABLET: TAB at 11:15

## 2019-10-26 RX ADMIN — PREDNISONE 5 MG: 5 TABLET ORAL at 11:17

## 2019-10-26 RX ADMIN — TACROLIMUS 0.5 MG: 0.5 CAPSULE ORAL at 17:53

## 2019-10-26 RX ADMIN — SODIUM CHLORIDE 300 ML: 9 INJECTION, SOLUTION INTRAVENOUS at 08:33

## 2019-10-26 ASSESSMENT — ACTIVITIES OF DAILY LIVING (ADL)
ADLS_ACUITY_SCORE: 11

## 2019-10-26 ASSESSMENT — MIFFLIN-ST. JEOR
SCORE: 1175.11
SCORE: 1159

## 2019-10-26 NOTE — PLAN OF CARE
1900 - 0730:    VSS ex HR on RA. Pt returned from Dialysis to be in what appeared to be A-fib. MD at bedside. Pt placed on Tele. Coreg restarted BID. One time dose of IV Metoprolol given. Plan to repeat EKG in AM. A&Ox4. PIV SL. Pt denies pain. Independent. Dialysis diet. Tolerating well. Voiding WNL. Pt denies BM overnight. Plan for dialysis run AM of 10/26. Plan to discharge home when tolerating Dialysis and outpatient Dialysis plan in place. Will continue to monitor.

## 2019-10-26 NOTE — PROGRESS NOTES
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 Baseline creatinine was 1.8 in Oct 2019 visit   Recent admissions: September 2019- Admitted for left sided chest pain. Had an incidental empyema that was monitored w/o Abx  Oct 6 2019- Admitted with chest pain, aspiration showed aspergillus, dc'ed on Voriconazole     New today-A. fib with RVR:  -After dialysis, had a run of A. fib with RVR.  -Resume to her home Coreg 25 mg twice daily.  -Has a history of paroxysmal A. Fib.    Oliguric RADHA on CKD stage IV  Has had an elevated Cr of 1.2-1.4 since 6/2018. Had seen Nephrology Dr. Gamble on 8/7/19 for elevated creatinine to 1.8. Renal biopsy 8/2019 with arterionephrosclerosis and features concerning for calcineurin toxicity (tacrolimus).   - Of note, patient was started on Voriconazole during recent admission 10/6 for empyema with cx + aspergillus.   - Pt presented with oliguria, elevated Creatinine despite 3 day OP IV fluid bolus challenge (10/18-20).   - CT scan on admission unremarkable for hydronephrosis or obstructing stone.   - Did report decreased PO intake and fluid intake due to abdominal bloating, and CT w/ new onset ascites.  - DDx for RADHA includes  -  medication induced including tacrolimus toxicity in setting of voriconazole,    - Also has a history of worsening HTN over the past 6 months, and was recently started on Imdur, carvedilol, in addition to PTA Amlodipine, therefore, it could be an additional  component of relative hypotension  - interstitial nephritis from bactrim (however, this has been chronic medication)  - UTI-very less likely vs pre-renal 2/2 poor oral intake. No s/sx of obstruction.   - Renal US w/ doppler to assess for FRANCHESCA- no evidence of stenosis  Plan:  -Given her FeNa of less than 1%, initial volume challenge given on admission with no improvement in urine output.  She was also given diuretic doses on 10/23 and 10/24.  With minimal urine output of less than 300 cc/day.  -She had paracentesis therapeutic on 10/23 and 10/24 with rapid reaccumulation of ascites and minimal relief.  -Nephrology team consulted appreciate recommendations  -Tacrolimus was held on admission given concern for toxicity.  Restarted on 10/24 at a reduced dose.  - basiliximab 10/24 x 1 dose given.  After discussion with IR, hemodialysis catheter placed on 10/25, had and tolerated her first session of dialysis on 10/25 with a second session on 10/26.  Hepatitis B panel and QuantiFERON gold sent.  Plans for outpatient hemodialysis schedule are being made by care coordinator.  - Discontinued all of her BP medications except for Coreg, will CTM BP and resume as needed. Permissive hypertension for now  - IV Ceftriaxone started on 10/23 on admission with concern for UTI/pyelonephritis as a possible cause of her RADHA.  Discontinued on 10/24 given no clear evidence of UTI.    -Monitor tacro level 10/26     New onset ascites:   Pt reports 23lb weight gain in 3 days prior to admission with CT showing e/o moderate ascites/anasarca  No hx of cirrhosis. CT w/ mild hepatosplenomegaly. Reporeds abdominal bloating, early satiety and decreased appetite. No abdominal pain or tenderness, fever or leukocytosis.  Abdominal ultrasound done on admission 10/23 shows ascites and possible evidence of liver disease.  No evidence of portal thrombus.  DDx:  -Portal hypertension due to cardiac cirrhosis from right-sided heart failure due to lung  disease  -Secondary to overall volume overload state from renal failure  -Malignancy  Plan:  -Diagnostic paracentesis done on10/23.  No evidence of SBP.  -Ascitic fluid also sent for analysis for lymphoma leukemia panel to rule out PTLD.  -Hepatology consulted appreciate recommendations.  Patient will eventually need a right heart cath and a liver biopsy to determine the etiology  and diagnosis of her ascites.  However, patient is currently hypervolemic.  Right heart cath should be done once she is dry/euvolemic for diagnostic accuracy of pulmonary hypertension.  Which would be determined in concert with nephrology.   If this exam is essentially normal and does not demonstrate significantly elevated right-sided cardiac pressures, would then proceed with a transjugular liver biopsy with portal pressure measurements  - IgG, F-actin and AMA sent for autoimmune w/u of liver Dz -pending results  -We do recommend following up with lab evaluation for autoimmune causes of liver disease  -Prior to admission, patient stopped her mirtazapine as she thought her bloating was a side effect.  Given that now we know this is secondary to ascites, resumed at home mirtazapine.     Hx of Bilateral Lung transplant for ILD:   Transplanted in 3/2018; course c/b R main bronchial stenosis requiring repeated dilatation, + DSAs, CMV viremia s/p valganciclovir with undetectable viral loads off therapy. Not on PTA O2 use. On PTA prednisone 5mg qAM and 2.5mg qPM and tacrolimus 2.5mg qAM and 2.0mg qPM and Bactrim 400-80mg daily for prophylaxis. Currently on room air.   - D/W Dr. Macias, Pulmonology in rounds today.  - Tacro resumed at a lower dose 10/24  - Hold PTA Bactrim for now per Pulm.  Will need to determine if she needs pentamadine while we await return of her renal function.  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 CT guided bx 10/8 with purulent fluid positive for Aspergillus fumigatus in cultures. ID was consulted during that admission and patient was started on Voriconazole and is continued on Vori 200mg BID. CT surgery was consulted and discussed significant risk of morbidity w/ surgery and recommended tx w/ antifungal and serial CT scan for f/u.  - Continue Voriconazole 200mg BID for now   - Will need a f/u CT chest for her empyema.  Plan for CT chest once patient is euvolemic and improvement in her ascites fluid.     Hx of low level CMV viremia:   Peak viral load >3M in 8/2018, was on ganciclovir until 7/25/19. Currently biweekly monitoring of low level CMV viremia. Not detected 10/6/19.   - Transplant Pulmonology consulted   -CMV level pending.     HTN:   On PTA Amlodipine 10mg daily, Carvedilol 25mg BID, and Imdur 120mg daily. Pt reports BP had been increasing and labile over the past 6 months. Was started on Carvedilol, Imdur during previous hospitalization.   -Resumed carvedilol 25 mg twice daily with hold parameters given her overnight episode of A. fib with RVR.  Currently in sinus rhythm.  Continue to hold amlodipine 10 mg and Imdur 120 mg.  - Renal US with doppler reviewed, no hydronephrosis     Gallbladder wall thickening with cholelithiasis  Mild wall thickening of cecus, ascending colon, and splenic flexture:   CT w/ Gallbladder wall thickening w/ cholelithasis. Findings may represent reactive changes to ascites. Acute cholecystitis cannot be ruled out.  This has been evidence on previous imaging studies.  MRI abd 9/12 w/o choledocholithaisis or obstructing mass. No RUQ pain. No WBC or elevated total bili, ALT or AST. Do not have concern for cholecystitis at this time. CT also with  mild wall thickening involving cecus, ascending colon, and splenic flexture could be reactive; vs mild colitis is also on ddx per Radiology. On exam, abdominal exam is benign.   - Monitor     Dermatomyositis  Seronegative RA:   No active treatments. Has had increasing bilateral knee pain which has been assessed with OP MRI. Follows with Rheumatology as OP.      Anemia  Thrombocytopenia:   Hgb 9.4 on admission. BL appears 8-9. Plt 134 on admission. Newly thrombocytopenic since 10/6.   - Trend CBC w diff   - Continue ferrous sulfate daily    Diet: Dialysis Diet  Snacks/Supplements Adult: Boost Shake; Between Meals    DVT Prophylaxis:d Low platlets, for now, SCDs only  Basilio Catheter: not present  Code Status: Full Code      Disposition Plan   Expected discharge: 4 - 7 days, recommended to prior living arrangement once HD placement is available  Entered: Chris Navarro MD 10/26/2019, 1:44 PM       The patient's care was discussed with the Bedside Nurse.    Chris Navarro MD  Hospitalist Service, [address], [place]  Please see sticky note for cross cover information  ______________________________________________________________________    Interval History   Kecia feels much better today.  Decreased abdominal bloating.  Her breathing is much improved.  She feels like there is room for her lungs to move it better.  Still continues to have some swelling in her abdomen and legs, however much improved.  She also thinks her urine output is marginally increased.    Diarrhea is at baseline.    Review of systems are otherwise negative for headache, chest pain, epigastric pain or burning sensation, abdominal pain, nausea vomiting or lightheadedness.    Data reviewed today: I reviewed all medications, new labs and imaging results over the last 24 hours.  Physical Exam   Vital Signs: Temp: 96.5  F (35.8  C) Temp src: Oral BP: 137/81 Pulse: 74 Heart Rate: 72 Resp: 20  SpO2: 98 % O2 Device: None (Room air)    Weight: 128 lbs 11.98 oz  Vital Signs: Temp: 97.7  F (36.5  C) Temp src: Oral BP: (!) 134/90 Pulse: 101 Heart Rate: 92 Resp: 13 SpO2: 97 % O2 Device: None (Room air)    Weight: 131 lbs 12.8 oz  General Appearance: Alert, well appearing, and in no acute distress  Mental Status: Oriented to person, place, and time  HEENT: No pallor or icterus. Pupils equal and reactive, extraocular eye movements intact. Mucous membranes moist, pharynx normal without lesions. Neck supple, no significant adenopathy, or thyromegaly  Chest: Bilateral equal air entry crackles on inspiration bilaterally, more on the posterior bases,  Heart: Normal S1, S2.  2/6 systolic murmur heard in the tricuspid and pulmonary areas, rubs, clicks or gallops. Normal rate, regular rhythm  Abdomen: Soft, nontender, distended, no masses or organomegaly, Bowel sounds heard, body wall edema +2 in anterior and posterior abdominal wall.  Neurological: Alert, oriented, normal speech, no acute focal findings  Skin and Extremities: Peripheral pulses normal, no pedal edema-however, edema and anasarca noted on upper thighs, no clubbing or cyanosis. No rashes, no suspicious skin lesions noted

## 2019-10-26 NOTE — PROGRESS NOTES
HEMODIALYSIS TREATMENT NOTE    Date: 10/26/2019  Time: 10:36 AM    Data:  Pre Wt: 60 kg (132 lb 4.4 oz)   Desired Wt: 58.8 kg   Post Wt: 58.4 kg (128 lb 12 oz)  Weight change: 1.6 kg  Ultrafiltration - Post Run Net Total Removed (mL): 1500 mL  Vascular Access Status: patent  Dialyzer Rinse: Light  Total Blood Volume Processed: 41 L   Total Dialysis (Treatment) Time: 2.5 hrs     Lab:   No    Interventions/Assessment:  59 yo HD 2.5 hrs; 1.5 L fluid removed. Pt tolerated HD well. Slight drop in SBP but bounced back quickly with reduced UFG. CVC patent, CDI, , saline locked/clear guard caps. See MAR for medications. Handoff report given to PCN.      Plan:    Per renal team.

## 2019-10-26 NOTE — PROGRESS NOTES
"/79 (BP Location: Right arm)   Pulse 78   Temp 96.4  F (35.8  C) (Oral)   Resp 19   Ht 1.626 m (5' 4\")   Wt 58.4 kg (128 lb 12 oz)   LMP 06/07/2014 (Exact Date)   SpO2 97%   BMI 22.10 kg/m      Time 4754-7844      Reason for admission: Acute kidney injury (H)   Vitals: VSS on RA  Activity: Up Ind  Pain: Denies pain  Neuro: A&Ox4, speech logical  Mood/Behavior: calm, cooperative  Cardiac: RRR, no edema BLE  Respiratory: LS clear  GI/: BM x1, Oliguric  Diet:Regular~ Renal   Lines: PIV  Skin: CDI  Labs/imaging: Cr 4.80      New changes this shift: Pt tolerated HD this morning. Up ambulating the hallway.       Plan: Continue to monitor and follow POC.          "

## 2019-10-26 NOTE — PROGRESS NOTES
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  She was admitted 10/21 with abdominal bloating and increased shortness of breath, s/p paracentesis (10/22) for 2L fluid removal with rapid reaccumulation and need for repeat paracentesis (10/24). S/p initiation of HD on 10/25, now with symptom improvement.     Today's recommendations:  - Tacrolimus level today supra therapeutic, dose decreased. Will monitor daily levels 10/27-10/29 (ordered)  - Weekly CMV, next due 10/31  - EBV level pending  - Agree with right heart cath, but would defer until patient is euvolemic, possibly as OP      S/p bilateral sequential lung transplant (BSLT): Reports increased dyspnea, likely due to pressure on her diaphragm from the recurrent ascites and also possibly from metabolic acidosis from renal dysfunction. No hypoxia, nonproductive congested cough.  CT scan (10/21) with small bilateral pleural effusions).  CXR (10/23) with increase in bilateral pleural effusions R>L as compared with 10/6.  - Volume and dialysis management per primary and nephrology teams     Immunosuppression: On 2-drug IST for recurrent CMV and leukopenia. S/p basiliximab 10/24.  - Tacrolimus level 10/26 supra theraputic at 12. Dose decreased from 1 mg BID to 1 mg qAM/ 0.5 mg qPM. Goal 8-10. Closely monitor tacro levels (ordered 10/27-10/29). Of note, dose held on admission for supratherapeutic level, resumed 10/24.    - Prednisone 5 mg qAM  / 2.5 mg qPM     CMV reactivation: CMV D+/R+. First positive level 7/9/18, bronch CMV as high as 3.1 million (8/2/18).  Valgancyclovir therapy stopped 7/25.  CMV level has been transiently elevated, but generally has remained low and <137 since 9/24.  - CMV weekly, next due 10/31 (ordered)     Aspergillus empyema: Aspergillus noted in pleural space 10/8.  Has been on voriconazole course since 10/10, LFTs generally stable save for low level alk phos.  F/u EKG after 1 week with QTc 469.  Voriconazole level therapeutic on 10/24.  - Continue voriconazole 200 mg BID, minimum 3 months  - Recommend repeat chest CT this admission     Other problems managed by the primary team:     Ascites: Abdominal CT (10/21) with moderate ascites/anasarca significantly increased from 9/12 and 10/1, gallbladder wall thickening with unchanged cholelithiasis soft tissue density in the medial left costophrenic angle, mild hepatosplenomegaly, and with mild wall thickening in the cecum, ascending colon and splenic flexure.  Abdominal US (10/22) with coarse hepatic echotexture, moderate ascites patent hepatic and portal vessels circumferential gallbladder wall thickening possible sludge versus gallbladder polyp.  Alkaline phosphatase is mildly elevated, remaining liver panel normal.  Ascites is not likely entirely d/t renal failure (see below), particularly since the CT reading suggests there may have been some ascites on previous September and October imaging.  S/p paracentesis 10/22 and repeated 10/24 d/t rapid reaccumulation. Cultures with NGTD. Leukemia/lymphoma evaluation without e/o B-cell non-Hodgkin lymphoma, noted Polytypic B cells and Hodgkin lymphoma cannot be excluded by   flow cytometry, however further w/u deferred as she does not have correlating clinical features. Hepatology consulted 10/24 (see their note), suggest chronic liver disease is likely 2/2 chronically increased right-sided cardiac pressures with resultant passive  congestive hepatopathy and hepatic fibrosis.  Some evidence of renal recovery, as creatinine seems to have plateaued and urine output is improving steadily.  - EBV level (10/24) pending  - Agree with right heart cath, but would defer until patient is euvolemic, possibly as OP  - Management per primary team     Acute on chronic kidney disease: The patient had stage III CKD but appears to have RADHA on admission, possibly d/t supratherapeutic tacrolimus level on admission (31.6, true trough).  It has probably been gradually increasing since the addition of voriconazole at her last visit.  Prior tacrolimus level was 5.3 on 10/10 and she was already developing ascites by 10/13, which seems a little too fast for ascites due to renal failure due to tacrolimus.  Although this is the most likely cause, a broader differential should be maintained. S/p HD line insertion and started iHD 10/25. Volume removal limited when rhythm briefly went into A-fib with RVR. Converted with IV Metoprolol, Afib has not recurred. Coreg resumed. Patient seen in dialysis today and is tolerating iHD thus far (10/26).   - Management per nephrology and primary teams, iHD today 10/26     We appreciate the excellent care provided by the [place] [id number] team. Recommendations communicated via in person rounding and this note. Will continue to follow along closely, please do not hesitate to call with any questions or concerns.     Patient discussed with Dr. Macias.    Juana Ibarra, APRN, CNP   Inpatient Nurse Practitioner  Pulmonary CF/Transplant  Pager [phone]        Subjective & Interval History:     No acute events overnight. Started dialysis yesterday after HD line insertion. Volume removal limited when rhythm briefly went into A-fib with RVR. Converted with IV Metoprolol, Afib has not recurred. Coreg resumed. Patient seen in dialysis today and is tolerating iHD thus far. Reports decreased abdominal discomfort and SOB following paracentesis  "and dialysis. aracentesis yesterday with 2.5L fluid removal, sent for further evaluation. Also less DESHPANDE, and AM cough is at baseline. Does continue to have frequent loose stools, about 6-7 times yesterday. Oliguric, but UOP is steadily improving.    Review of Systems:     C: no fever, no chills, no change in weight, no change in appetite  INTEGUMENTARY/SKIN: no rash or obvious new lesions  ENT/MOUTH: no sore throat, no sinus pain, no nasal congestion or drainage  RESP: see interval history  CV: no chest pain, no palpitations, + peripheral edema, no orthopnea  GI: see interval history  : + oliguria  MUSCULOSKELETAL: no myalgias, no arthralgias  ENDOCRINE: blood sugars with adequate control  NEURO: no headache, no numbness or tingling  PSYCHIATRIC: mood stable    Physical Exam:     Vital signs:  Temp: 96.5  F (35.8  C) Temp src: Oral BP: 137/81 Pulse: 74 Heart Rate: 72 Resp: 20 SpO2: 98 % O2 Device: None (Room air) Oxygen Delivery: 1/2 LPM Height: 162.6 cm (5' 4\") Weight: 58.4 kg (128 lb 12 oz)  I/O:     Intake/Output Summary (Last 24 hours) at 10/26/2019 1352  Last data filed at 10/26/2019 1100  Gross per 24 hour   Intake 0 ml   Output 2650 ml   Net -2650 ml     Constitutional: Lying in bed, in no apparent distress.   HEENT: Eyes with pink conjunctivae, anicteric. Oral mucosa moist without lesions.   PULM: Diminished bases bilaterally. Crackles bilateral bases, no rhonchi, no wheezes. Non-labored breathing on RA.  CV: Normal S1 and S2. RRR. No murmur, gallop, or rub. 1+ BLE edema.   ABD: NABS, mildly firm and distended, mild tenderness.    MSK: Moves all extremities. No apparent muscle wasting.   NEURO: Alert and oriented, conversant.   SKIN: Warm, dry. No rash on limited exam.   PSYCH: Mood stable.    Lines, Drains, and Devices:  Peripheral IV 10/21/19 Left Lower forearm (Active)   Site Assessment WDL 10/25/2019  8:40 PM   Line Status Saline locked 10/25/2019  8:40 PM   Phlebitis Scale 0-->no symptoms 10/25/2019  " 8:40 PM   Infiltration Scale 0 10/25/2019  8:40 PM   Infiltration Site Treatment Method  None 10/25/2019  8:40 PM   Extravasation? No 10/25/2019  8:40 PM   Number of days: 5       CVC Double Lumen 10/25/19 Right Internal jugular (Active)   Site Assessment WDL 10/26/2019 10:26 AM   Dressing Intervention Chlorhexidine sponge;Transparent 10/21/2019 12:00 AM   Dressing Change Due 11/01/19 10/25/2019  5:10 PM   CVC Comment CDI  10/26/2019 10:26 AM   Lumen A - Color BLUE 10/26/2019 10:26 AM   Lumen A - Status saline locked;cap changed 10/26/2019 10:26 AM   Lumen A - Cap Change Due 11/01/19 10/25/2019  7:00 PM   Lumen B - Color RED 10/26/2019 10:26 AM   Lumen B - Status saline locked;cap changed 10/26/2019 10:26 AM   Lumen B - Cap Change Due 11/01/19 10/25/2019  7:00 PM   Extravasation? No 10/26/2019 10:26 AM   Number of days: 1     Data:     LABS    CMP:   Recent Labs   Lab 10/26/19  0626 10/25/19  0733 10/24/19  2032 10/24/19  0557  10/23/19  0322 10/22/19  1314 10/21/19  1752 10/21/19  1451    137 136 132*  --  135 133 133 132*   POTASSIUM 4.2 4.8 4.6 4.6   < > 4.8 4.5 5.0 4.5   CHLORIDE 111* 112* 110* 105  --  109 109 109 107   CO2 19* 14* 14* 13*  --  11* 13* 10* 14*   ANIONGAP 10 11 12 14  --  15* 12 13 12   * 131* 118* 89  --  139* 127* 138* 111*   BUN 49* 66* 69* 71*  --  70* 66* 66* 67*   CR 4.80* 6.16* 6.37* 6.60*  --  6.62* 6.18* 5.76* 5.90*   GFRESTIMATED 9* 7* 7* 6*  --  6* 7* 8* 7*   GFRESTBLACK 11* 8* 8* 7*  --  7* 8* 9* 8*   CHELSEY 8.1* 8.3* 8.2* 8.8  --  8.0* 8.2* 8.0* 8.5   MAG  --   --   --   --   --  2.0 2.1 2.0  --    PHOS  --   --   --   --   --  7.4* 7.3* 6.7*  --    PROTTOTAL  --   --  6.9  --   --  6.3* 6.7*  --  7.2   ALBUMIN  --   --  2.6*  --   --  2.6* 2.7*  --  3.0*   BILITOTAL  --   --  0.2  --   --  0.3 0.4  --  0.3   ALKPHOS  --   --  227*  --   --  174* 181*  --  194*   AST  --   --  21  --   --  12 19  --  18   ALT  --   --  13  --   --  13 18  --  16    < > = values in this  interval not displayed.     CBC:   Recent Labs   Lab 10/26/19  0626 10/24/19  2032 10/23/19  0322 10/22/19  1314   WBC 6.8 9.1 5.9 5.3   RBC 3.61* 3.98 3.27* 3.18*   HGB 10.1* 11.3* 9.1* 9.0*   HCT 34.1* 37.3 30.4* 29.7*   MCV 95 94 93 93   MCH 28.0 28.4 27.8 28.3   MCHC 29.6* 30.3* 29.9* 30.3*   RDW 19.9* 19.9* 19.6* 19.5*   * 160 140* 142*       INR:   Recent Labs   Lab 10/22/19  1314   INR 1.12       Glucose:   Recent Labs   Lab 10/26/19  0626 10/25/19  0733 10/24/19  2032 10/24/19  0557 10/23/19  0322 10/22/19  1314   * 131* 118* 89 139* 127*       Blood Gas:   Recent Labs   Lab 10/21/19  1459   PHV 7.25*   PCO2V 29*   PO2V 35   HCO3V 13*       Culture Data   Recent Labs   Lab 10/22/19  1430   CULT Culture negative monitoring continues       Virology Data:   Lab Results   Component Value Date    FLUAH1 Negative 10/07/2019    FLUAH3 Negative 10/07/2019    PU0079 Negative 10/07/2019    IFLUB Negative 10/07/2019    RSVA Negative 10/07/2019    RSVB Negative 10/07/2019    PIV1 Negative 10/07/2019    PIV2 Negative 10/07/2019    PIV3 Negative 10/07/2019    HMPV Negative 10/07/2019    HRVS Negative 10/07/2019    ADVBE Negative 10/07/2019    ADVC Negative 10/07/2019    ADVC Negative 03/07/2019    ADVC Negative 01/17/2019       Historical CMV results (last 3 of prior testing):  Lab Results   Component Value Date    CMVQNT CMV DNA Not Detected 10/24/2019    CMVQNT CMV DNA Not Detected 10/06/2019    CMVQNT <137 (A) 09/12/2019     Lab Results   Component Value Date    CMVLOG Not Calculated 10/24/2019    CMVLOG Not Calculated 10/06/2019    CMVLOG <2.1 09/12/2019       Urine Studies    Recent Labs   Lab Test 10/21/19  2240 09/12/19  0125   URINEPH 5.0 7.5*   NITRITE Negative Negative   LEUKEST Large* Negative   WBCU 115* 3       Most Recent [place] Pulmonary Function Testing (FVC/FEV1 only)  FVC-Pre   Date Value Ref Range Status   08/07/2019 2.22 L    06/05/2019 2.26 L    03/05/2019 1.97 L    01/16/2019 1.92 L       FVC-%Pred-Pre   Date Value Ref Range Status   08/07/2019 67 %    06/05/2019 70 %    03/05/2019 60 %    01/16/2019 59 %      FEV1-Pre   Date Value Ref Range Status   08/07/2019 1.60 L    06/05/2019 1.65 L    03/05/2019 1.31 L    01/16/2019 1.04 L      FEV1-%Pred-Pre   Date Value Ref Range Status   08/07/2019 61 %    06/05/2019 64 %    03/05/2019 51 %    01/16/2019 40 %        IMAGING    Recent Results (from the past 48 hour(s))   XR Abdomen Port 1 View    Narrative    Exam: XR ABDOMEN PORT 1 VW, 10/24/2019 8:17 PM    Indication: epigastric pain - obtain upright film    Comparison: None available    Findings:   Single upright frontal view of the abdomen. No abnormally dilated  loops of small bowel or colon. No free air in the abdomen. Median  sternotomy wires. Bibasilar atelectasis and pleural effusion.      Impression    Impression: No free air in the abdomen identified.    I have personally reviewed the examination and initial interpretation  and I agree with the findings.    ADAM GONZALEZ MD   IR CVC Tunnel Placement > 5 Yrs of Age    Narrative    PROCEDURES:  1. Ultrasound guidance for venous access  2. Placement centrally inserted catheter (age > 5 yrs.) tunneled, no  port, no pump  3. Fluoroscopic guidance placement    Clinical History: Renal failure, requires access for dialysis.  Tunneled dialysis catheter placement requested.    Comparisons: 10/8/2019    Staff Radiologist: Obie Baugh MD    Fellow(s)/Resident(s): MD Morgan García MD    I, OBIE BAUGH MD, attest that I was present for all critical portions  of the procedure and was immediately available to provide guidance and  assistance during the remainder of the procedure.    Medications: The patient was placed on continuous monitoring.  Intravenous sedation was administered. 2 mg Versed and 100 mcg  fentanyl IV. Vital signs and sedation monitored by nursing staff under  Interventional Radiologists supervision. The patient remained  stable  throughout the procedure.    Attending face-to-face sedation time: Less than 5 minutes.    Fluoroscopy time: 1.7 minutes.    PROCEDURE: The patient understood the limitations, alternatives, and  risks of the procedure and requested the procedure be performed. Both  written and oral consent were obtained.    Limited jugular ultrasound documented jugular vein patency.    The right neck and upper chest were prepped and draped in the usual  sterile fashion. 1% lidocaine without epinephrine was used for local  anesthesia.     Under ultrasound guidance, right internal jugular venotomy was made  with a micropuncture needle. Permanent copy of the image documenting  the vein was entered into the patients record.    Micropuncture needle exchanged over guidewire for the micropuncture  sheath under fluoroscopic guidance. Catheter length measured with the  0.018 guidewire. Micropuncture sheath saline locked. A 23 cm tip to  cuff 14.5 Palindrome dialysis catheter was subcutaneously tunneled  from the right anterior chest to the right internal jugular venotomy  site. Micropuncture sheath exchanged over guidewire for the peel-away  sheath. Guidewire removed. Under fluoroscopic guidance, the catheter  placed through a peel-away sheath and positioned with its tip in the  right atrium.     Fluoroscopic image documenting catheter placement a position was saved  in the patient's record.    Both lumens flushed and aspirated adequately. Each lumen heparin  locked with 1000:1 heparin solution. 2-0 nylon catheter retaining  suture and sterile dressing applied. Right internal jugular venotomy  site closed with Dermabond and 3-0 Vicryl deep dermal suture. No  immediate complication.      Impression    IMPRESSION:   1. Ultrasound guided right internal jugular venotomy.  2. Twenty-three cm tip to cuff 14.5 Lao Vaxcel tunneled dialysis  catheter placed under fluoroscopic guidance with the tip in the right  atrium. Both lumens heparin  locked and ready for use.    I have personally reviewed the examination and initial interpretation  and I agree with the findings.    OBIE DAWSON MD

## 2019-10-26 NOTE — PROGRESS NOTES
Medicine cross cover note -     I was called to see the patient regarding an asymptomatic and rapid irregular heart rate that developed during dialysis.  We obtained an EKG which  found atrial fibrillation.  The patient does have a history of brief paroxysmal A. fib shortly after her surgery.  She is normally on Coreg.    She was placed on tele and treated with 1 dose of 5 mg IV metoprolol after which her atrial fibrillation resolved.    - Coreg restarted  - EKG in am

## 2019-10-26 NOTE — PROGRESS NOTES
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 Baseline creatinine was 1.8 in Oct 2019 visit   Recent admissions: September 2019- Admitted for left sided chest pain. Had an incidental empyema that was monitored w/o Abx  Oct 6 2019- Admitted with chest pain, aspiration showed aspergillus, dc'ed on Voriconazole     Oliguric RADHA on CKD stage IV  Has had an elevated Cr of 1.2-1.4 since 6/2018. Had seen Nephrology Dr. Gamble on 8/7/19 for elevated creatinine to 1.8. Renal biopsy 8/2019 with arterionephrosclerosis and features concerning for calcineurin toxicity (tacrolimus).   - Of note, patient was started on Voriconazole during recent admission 10/6 for empyema with cx + aspergillus.   - Pt presented with oliguria, elevated Creatinine despite 3 day OP IV fluid bolus challenge (10/18-20).   - CT scan on admission unremarkable for hydronephrosis or obstructing stone.   - Did report decreased PO intake and fluid intake due to abdominal bloating, and CT w/ new onset ascites.  - DDx for RADHA includes  -  medication induced including tacrolimus toxicity in setting of voriconazole,    - Also has a history of worsening HTN over the past 6 months, and was recently started on Imdur, carvedilol, in addition to PTA Amlodipine, therefore, it could be an additional component of relative hypotension  - interstitial nephritis from bactrim (however, this has been chronic medication)  - UTI-very less likely vs pre-renal 2/2 poor oral intake. No s/sx of obstruction.   - Renal US w/ doppler to assess for FRANCHESCA- no evidence of  stenosis  Plan:  -Given her FeNa of less than 1%, initial volume challenge given on admission with no improvement in urine output.  She was also given diuretic doses on 10/23 and 10/24.  With minimal urine output of less than 300 cc/day.  -She had paracentesis therapeutic on 10/23 and 10/24 with rapid reaccumulation of ascites and minimal relief.  -Nephrology team consulted appreciate recommendations  -Tacrolimus was held on admission given concern for toxicity.  Restarted on 10/24 at a reduced dose.  - basiliximab 10/24 x 1 dose given.  After discussion with IR, hemodialysis catheter placed on 10/25 and will plan for dialysis session on 10/25.  Hepatitis B panel and QuantiFERON gold sent.  Plans for outpatient hemodialysis schedule are being made by care coordinator.  - Discontinued all of her BP medications, will CTM BP and resume as needed. Permissive hypertension for now  - IV Ceftriaxone started on 10/23 on admission with concern for UTI/pyelonephritis as a possible cause of her RADHA.  Discontinued on 10/24 given no clear evidence of UTI.    -Monitor tacro level.     New onset ascites:   Pt reports 23lb weight gain in 3 days prior to admission with CT showing e/o moderate ascites/anasarca  No hx of cirrhosis. CT w/ mild hepatosplenomegaly. Reporeds abdominal bloating, early satiety and decreased appetite. No abdominal pain or tenderness, fever or leukocytosis.  Abdominal ultrasound done on admission 10/23 shows ascites and possible evidence of liver disease.  No evidence of portal thrombus.  DDx:  -Portal hypertension due to cardiac cirrhosis from right-sided heart failure due to lung disease  -Secondary to overall volume overload state from renal failure  -Malignancy  Plan:  -Diagnostic paracentesis done on10/23.  No evidence of SBP.  -Ascitic fluid also sent for analysis for lymphoma leukemia panel to rule out PTLD.  -Hepatology consulted appreciate recommendations.  Patient will eventually need a right heart  cath and a liver biopsy to determine the etiology  and diagnosis of her ascites.  However, patient is currently hypervolemic.  Right heart cath should be done once she is dry/euvolemic for diagnostic accuracy of pulmonary hypertension.  Which would be determined in concert with nephrology.  Right heart cath and liver biopsy can also be done as an outpatient.  -Prior to admission, patient stopped her mirtazapine as she thought her bloating was a side effect.  Given that now we know this is secondary to ascites, resumed at home mirtazapine.     Hx of Bilateral Lung transplant for ILD:   Transplanted in 3/2018; course c/b R main bronchial stenosis requiring repeated dilatation, + DSAs, CMV viremia s/p valganciclovir with undetectable viral loads off therapy. Not on PTA O2 use. On PTA prednisone 5mg qAM and 2.5mg qPM and tacrolimus 2.5mg qAM and 2.0mg qPM and Bactrim 400-80mg daily for prophylaxis. Currently on room air.   - D/W Dr. Macias, Pulmonology in rounds today.  - Tacro resumed at a lower dose 10/24  - Hold PTA Bactrim for now per Pulm.  Will need to determine if she needs pentamadine while we await return of her renal function.  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 CT guided bx 10/8 with purulent fluid positive for Aspergillus fumigatus in cultures. ID was consulted during that admission and patient was started on Voriconazole and is continued on Vori 200mg BID. CT surgery was consulted and discussed significant risk of morbidity w/ surgery and recommended tx w/ antifungal and serial CT scan for f/u.  - Continue Voriconazole 200mg BID for now   - Will need a f/u CT  chest for her empyema.  Plan for CT chest once patient is euvolemic and improvement in her ascites fluid.     Hx of low level CMV viremia:   Peak viral load >3M in 8/2018, was on ganciclovir until 7/25/19. Currently biweekly monitoring of low level CMV viremia. Not detected 10/6/19.   - Transplant Pulmonology consulted   -CMV level pending.     HTN:   On PTA Amlodipine 10mg daily, Carvedilol 25mg BID, and Imdur 120mg daily. Pt reports BP had been increasing and labile over the past 6 months. Was started on Carvedilol, Imdur during previous hospitalization.   -Continue to hold all BP medications to allow higher BPs to help blood flow to kidneys  - Renal US with doppler reviewed, no hydronephrosis     Gallbladder wall thickening with cholelithiasis  Mild wall thickening of cecus, ascending colon, and splenic flexture:   CT w/ Gallbladder wall thickening w/ cholelithasis. Findings may represent reactive changes to ascites. Acute cholecystitis cannot be ruled out.  This has been evidence on previous imaging studies.  MRI abd 9/12 w/o choledocholithaisis or obstructing mass. No RUQ pain. No WBC or elevated total bili, ALT or AST. Do not have concern for cholecystitis at this time. CT also with mild wall thickening involving cecus, ascending colon, and splenic flexture could be reactive; vs mild colitis is also on ddx per Radiology. On exam, abdominal exam is benign.   - Monitor     Dermatomyositis  Seronegative RA:   No active treatments. Has had increasing bilateral knee pain which has been assessed with OP MRI. Follows with Rheumatology as OP.      Anemia  Thrombocytopenia:   Hgb 9.4 on admission. BL appears 8-9. Plt 134 on admission. Newly thrombocytopenic since 10/6.   - Trend CBC w diff   - Continue ferrous sulfate daily        Diet: Snacks/Supplements Adult: Boost Plus; Between Meals  Dialysis Diet    DVT Prophylaxis: Low Risk/Ambulatory with no VTE prophylaxis indicated  Basilio Catheter: not present  Code Status:  Full Code      Disposition Plan   Expected discharge: 2 - 3 days, recommended to prior living arrangement once renal function improved.  Entered: Chris Navarro MD 10/25/2019, 7:19 PM       The patient's care was discussed with the Bedside Nurse.    Chris Navarro MD  Hospitalist Service  [facility], [place]    ______________________________________________________________________    Interval History   Patient feels very bloated. She had temporary relief from abdominal distention after her para yesterday and the discomfort recurred.  She also had epigastric pain that improved with a GI cocktail    Review of systems otherwise negative for headache, shortness of breath, nausea vomiting diarrhea at baseline, no blood in stool or urine    Kecia had a urine output of 200 cc this morning at 8 AM.  Patient,  were initially unsure if we should wait to assess urine output throughout the day or proceed with hemodialysis catheter placement.  Discussed that we can only look at data retrospectively and it is quite possible that she might have urine output tomorrow or the day after, negating the placement of HD catheter.  However at this time with the data we have, she is oliguric and we should proceed with hemodialysis catheter placement.   Patient and  agreeable to hemodialysis catheter placement today.      Data reviewed today: I reviewed all medications, new labs and imaging results over the last 24 hours  .Physical Exam   Vital Signs: Temp: 97.7  F (36.5  C) Temp src: Oral BP: (!) 134/90 Pulse: 101 Heart Rate: 92 Resp: 13 SpO2: 97 % O2 Device: None (Room air)    Weight: 131 lbs 12.8 oz  General Appearance: Alert, well appearing, and in no acute distress  Mental Status: Oriented to person, place, and time  HEENT: No pallor or icterus. Pupils equal and reactive, extraocular eye movements intact. Mucous membranes moist, pharynx normal without lesions. Neck  supple, no significant adenopathy, or thyromegaly  Chest: Bilateral equal air entry crackles on inspiration bilaterally, more on the posterior bases,  Heart: Normal S1, S2.  2/6 systolic murmur heard in the tricuspid and pulmonary areas, rubs, clicks or gallops. Normal rate, regular rhythm  Abdomen: Soft, nontender, distended, no masses or organomegaly, Bowel sounds heard, body wall edema +2 in anterior and posterior abdominal wall.  Neurological: Alert, oriented, normal speech, no acute focal findings  Skin and Extremities: Peripheral pulses normal, no pedal edema-however, edema and anasarca noted on upper thighs, no clubbing or cyanosis. No rashes, no suspicious skin lesions noted

## 2019-10-26 NOTE — PROGRESS NOTES
HEMODIALYSIS TREATMENT NOTE    Date: 10/25/2019  Time: 1900    Data:  Pre Wt:  59.8 kg   Desired Wt: 58.8 kg   Post Wt:    Weight change:     Ultrafiltration - Post Run Net Total Removed (mL): 700 mL  Vascular Access Status: patent  Dialyzer Rinse: Light  Total Blood Volume Processed: 20.01 L   Total Dialysis (Treatment) Time: 2 hours    Lab: Hep B Antigen/Antibodies    Assessment/Interventions:  2 hour HD initiation run via right internal jugular dialysis catheter, shortened by 30 minutes, 2/2 rhythm changes.  BP stable.  Pt alert and oriented.  Dr Esquivel at bedside and stat EKG ordered.  Primary MD informed and to see pt in room on 5A.   mL/min as directed per order. 700 mL removed.  Report called.       Plan:  Continue with plan of care.  Next run planned for tomorrow.

## 2019-10-26 NOTE — PROGRESS NOTES
Nephrology  Progress note- dialysis visit  10/26/2019     This patient was seen and examined while on dialysis.  Professional oversight of the patient's dialysis care, access care and dialysis related co-morbidities were addressed as necessary with the patient and / or staff.   Tolerating second run- able to tolerate more UF today    Laboratory results and nurses' notes were reviewed.   No changes to management of volume, anemia, BMD, acidosis, or electrolytes.   Diagnosis - RADHA  Next run 10/28/19      Yenni Gamble MD  [facility]  Department of Medicine  Division of Renal Disease and Hypertension  [phone]

## 2019-10-26 NOTE — PLAN OF CARE
"To the best of my knowledge the information in the note below is accurate.  Surekha Rg RN  BP: 128/79  P: 78  Temp: 96.4 degrees F  RR: 19  Wt: 58.4 kg  BMI: 22.1 kg/m2    Activity: Up ad [place]go, patient steady and moving independently.  Pain: Denies this shift.  Neuro: A&Ox4, nerves intact.  Cardiac: WDL.  Resp: Some dyspnea on exertion, lung sounds diminished in R&L lower lobes and some fine crackles as well. Pt has intermittent cough.   GI/: Abdomen rounded, distended secondary to ascites, edema. Improved with HD. Pt reported BM of loose diarrhea at 1330, and said this was her baseline with immunosuppressant drugs.   Diet: Renal, dialysis diet.   Lines: PIV on L forearm, saline locked. Tunneled CVC in R internal jugular vein, saline locked.   Labs: RBC count: 3.61, HGB: 10.1, hematocrit: 34.1, MCHC: 29.6, RDW: 19.9, chloride: 111, CO2: 19, BUN: 49, creatinine: 4.8, Alk phos 227, albumin 2.6, and PLTS: 104.   New this shift: Pt received HD, and tolerated well albeit mild lightheadedness and drop in BP. Lost 1.6 kg. Pt expressed that it was easier to breathe and that she felt less discomfort after procedure.     SOAP note:  S: Patient expressed slight frustration with renal diet and having limited options. \"The hospital food is not that great\". Also discussed having little to no appetite yesterday due to buildup of fluid in abdomen.   O: First time eating a full meal was last night. Observed her on the phone with the cafeteria being frustrated with going over her points and having to adjust her meal plan.   A: Imbalanced nutrition: less than bodily requirements r/t decreased desire to eat.  P: Continue to educate on renal diet, and help her choose meal options. Support her emotionally and acknowledge her feelings on this transition to a new diet. Continue HD per HCP orders to reduce fluid build up.     - Lisa Yeager SN.         "

## 2019-10-27 LAB
ANION GAP SERPL CALCULATED.3IONS-SCNC: 8 MMOL/L (ref 3–14)
BACTERIA SPEC CULT: NO GROWTH
BUN SERPL-MCNC: 31 MG/DL (ref 7–30)
CALCIUM SERPL-MCNC: 7.9 MG/DL (ref 8.5–10.1)
CHLORIDE SERPL-SCNC: 107 MMOL/L (ref 94–109)
CO2 SERPL-SCNC: 20 MMOL/L (ref 20–32)
CREAT SERPL-MCNC: 3.56 MG/DL (ref 0.52–1.04)
GFR SERPL CREATININE-BSD FRML MDRD: 13 ML/MIN/{1.73_M2}
GLUCOSE SERPL-MCNC: 116 MG/DL (ref 70–99)
PHOSPHATE SERPL-MCNC: 4.2 MG/DL (ref 2.5–4.5)
POTASSIUM SERPL-SCNC: 4 MMOL/L (ref 3.4–5.3)
SODIUM SERPL-SCNC: 135 MMOL/L (ref 133–144)
SPECIMEN SOURCE: NORMAL
TACROLIMUS BLD-MCNC: 9 UG/L (ref 5–15)
TME LAST DOSE: NORMAL H

## 2019-10-27 PROCEDURE — 82784 ASSAY IGA/IGD/IGG/IGM EACH: CPT | Performed by: HOSPITALIST

## 2019-10-27 PROCEDURE — 12000001 ZZH R&B MED SURG/OB UMMC

## 2019-10-27 PROCEDURE — 80048 BASIC METABOLIC PNL TOTAL CA: CPT | Performed by: HOSPITALIST

## 2019-10-27 PROCEDURE — 36415 COLL VENOUS BLD VENIPUNCTURE: CPT | Performed by: HOSPITALIST

## 2019-10-27 PROCEDURE — 25000132 ZZH RX MED GY IP 250 OP 250 PS 637: Performed by: HOSPITALIST

## 2019-10-27 PROCEDURE — 25000132 ZZH RX MED GY IP 250 OP 250 PS 637: Performed by: PHYSICIAN ASSISTANT

## 2019-10-27 PROCEDURE — 82955 ASSAY OF G6PD ENZYME: CPT | Performed by: HOSPITALIST

## 2019-10-27 PROCEDURE — 25000131 ZZH RX MED GY IP 250 OP 636 PS 637: Performed by: NURSE PRACTITIONER

## 2019-10-27 PROCEDURE — 25000131 ZZH RX MED GY IP 250 OP 636 PS 637: Performed by: PHYSICIAN ASSISTANT

## 2019-10-27 PROCEDURE — 83516 IMMUNOASSAY NONANTIBODY: CPT | Performed by: HOSPITALIST

## 2019-10-27 PROCEDURE — 84100 ASSAY OF PHOSPHORUS: CPT | Performed by: HOSPITALIST

## 2019-10-27 PROCEDURE — 80197 ASSAY OF TACROLIMUS: CPT | Performed by: NURSE PRACTITIONER

## 2019-10-27 RX ADMIN — PREDNISONE 2.5 MG: 2.5 TABLET ORAL at 20:09

## 2019-10-27 RX ADMIN — CARVEDILOL 25 MG: 25 TABLET, FILM COATED ORAL at 09:20

## 2019-10-27 RX ADMIN — VORICONAZOLE 200 MG: 200 TABLET, FILM COATED ORAL at 09:20

## 2019-10-27 RX ADMIN — MULTIPLE VITAMINS W/ MINERALS TAB 1 TABLET: TAB at 09:20

## 2019-10-27 RX ADMIN — Medication 1 TABLET: at 09:19

## 2019-10-27 RX ADMIN — FERROUS SULFATE TAB 325 MG (65 MG ELEMENTAL FE) 325 MG: 325 (65 FE) TAB at 09:21

## 2019-10-27 RX ADMIN — MELATONIN TAB 3 MG 3 MG: 3 TAB at 21:57

## 2019-10-27 RX ADMIN — PREDNISONE 5 MG: 5 TABLET ORAL at 09:20

## 2019-10-27 RX ADMIN — PANTOPRAZOLE SODIUM 40 MG: 40 TABLET, DELAYED RELEASE ORAL at 09:19

## 2019-10-27 RX ADMIN — TACROLIMUS 1 MG: 1 CAPSULE ORAL at 09:21

## 2019-10-27 RX ADMIN — CARVEDILOL 25 MG: 25 TABLET, FILM COATED ORAL at 17:48

## 2019-10-27 RX ADMIN — MIRTAZAPINE 15 MG: 15 TABLET, FILM COATED ORAL at 21:57

## 2019-10-27 RX ADMIN — TACROLIMUS 0.5 MG: 0.5 CAPSULE ORAL at 17:48

## 2019-10-27 RX ADMIN — VORICONAZOLE 200 MG: 200 TABLET, FILM COATED ORAL at 20:09

## 2019-10-27 RX ADMIN — PANTOPRAZOLE SODIUM 40 MG: 40 TABLET, DELAYED RELEASE ORAL at 20:09

## 2019-10-27 ASSESSMENT — ACTIVITIES OF DAILY LIVING (ADL)
ADLS_ACUITY_SCORE: 11

## 2019-10-27 ASSESSMENT — MIFFLIN-ST. JEOR: SCORE: 1166.94

## 2019-10-27 NOTE — PROGRESS NOTES
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 Baseline creatinine was 1.8 in Oct 2019 visit   Recent admissions: September 2019- Admitted for left sided chest pain. Had an incidental empyema that was monitored w/o Abx  Oct 6 2019- Admitted with chest pain, aspiration showed aspergillus, dc'ed on Voriconazole     Paroxysmal Afib  -After dialysis, had a run of A. fib with RVR on 10/25  -Resume to her home Coreg 25 mg twice daily. In NSR since 10/26  -Has a history of paroxysmal A. Fib. Not on any anticoagulation. Her [id number] score is 2- for HTN and sex (female)  - Will hold off on AC at this time as her platelets are low normal at 106       Oliguric RADHA on CKD stage IV  Has had an elevated Cr of 1.2-1.4 since 6/2018. Had seen Nephrology Dr. Gamble on 8/7/19 for elevated creatinine to 1.8. Renal biopsy 8/2019 with arterionephrosclerosis and features concerning for calcineurin toxicity (tacrolimus).   - Of note, patient was started on Voriconazole during recent admission 10/6 for empyema with cx + aspergillus.   - Pt presented with oliguria, elevated Creatinine despite 3 day OP IV fluid bolus challenge (10/18-20).   - CT scan on admission unremarkable for hydronephrosis or obstructing stone.   - Did report decreased PO intake and fluid intake due to abdominal bloating, and CT w/ new onset ascites.  - DDx for RADHA includes  - Medication induced including tacrolimus toxicity in setting of voriconazole  - Also has a history of worsening HTN over the past 6 months, and was recently started on Imdur,  carvedilol, in addition to PTA Amlodipine, therefore, it could be an additional component of relative hypotension  - interstitial nephritis from bactrim (however, this has been chronic medication)  - UTI-very less likely vs pre-renal 2/2 poor oral intake. No s/sx of obstruction.   - Renal US w/ doppler to assess for FRANCHESCA- no evidence of stenosis  Plan:  -Given her FeNa of less than 1%, initial volume challenge given on admission with no improvement in urine output.  She was also given diuretic doses on 10/23 and 10/24.  With minimal urine output of less than 300 cc/day.  -She had paracentesis therapeutic on 10/23 and 10/24 with rapid reaccumulation of ascites and minimal relief.  -Nephrology team consulted appreciate recommendations  -Tacrolimus was held on admission given concern for toxicity.  Restarted on 10/24 at a reduced dose.  - basiliximab 10/24 x 1 dose given.  - After discussion with IR, hemodialysis catheter placed on 10/25, had and tolerated her first session of dialysis on 10/25 with a second session on 10/26.  Hepatitis B panel and QuantiFERON gold sent.  Plans for outpatient hemodialysis schedule are being made by care coordinator.  - Discontinued all of her BP medications except for Coreg, will CTM BP and resume as needed. Permissive hypertension for now  - IV Ceftriaxone started on 10/23 on admission with concern for UTI/pyelonephritis as a possible cause of her RADHA.  Discontinued on 10/24 given no clear evidence of UTI.    - Tacrolimus 1 mg qAM/ 0.5 mg qPM. Goal 8-10. Level (10/26) 12, dose reduced. Trended level  9.0 on 10/27  - .Monitor tacro levels on 10/28, 10/29      New onset ascites:   Pt reports 23lb weight gain in 3 days prior to admission with CT showing e/o moderate ascites/anasarca  No hx of cirrhosis. CT w/ mild hepatosplenomegaly. Reporeds abdominal bloating, early satiety and decreased appetite. No abdominal pain or tenderness, fever or leukocytosis.  Abdominal ultrasound done on  admission 10/23 shows ascites and possible evidence of liver disease.  No evidence of portal thrombus.  DDx:  -Portal hypertension due to cardiac cirrhosis from right-sided heart failure due to lung disease  -Secondary to overall volume overload state from renal failure  -Malignancy  Plan:  -Diagnostic paracentesis done on10/23.  No evidence of SBP.  -Ascitic fluid also sent for analysis for lymphoma leukemia panel to rule out PTLD.  -Hepatology consulted appreciate recommendations.  Patient will eventually need a right heart cath and a liver biopsy to determine the etiology  and diagnosis of her ascites.  However, patient is currently hypervolemic.  Right heart cath should be done once she is dry/euvolemic for diagnostic accuracy of pulmonary hypertension.  Which would be determined in concert with nephrology.   If this exam is essentially normal and does not demonstrate significantly elevated right-sided cardiac pressures, would then proceed with a transjugular liver biopsy with portal pressure measurements  - IgG, F-actin and AMA sent for autoimmune w/u of liver Dz -pending results  -We do recommend following up with lab evaluation for autoimmune causes of liver disease  -Prior to admission, patient stopped her mirtazapine as she thought her bloating was a side effect.  Given that now we know this is secondary to ascites, resumed at home mirtazapine.     Hx of Bilateral Lung transplant for ILD:   Transplanted in 3/2018; course c/b R main bronchial stenosis requiring repeated dilatation, + DSAs, CMV viremia s/p valganciclovir with undetectable viral loads off therapy. Not on PTA O2 use. On PTA prednisone 5mg qAM and 2.5mg qPM and tacrolimus 2.5mg qAM and 2.0mg qPM and Bactrim 400-80mg daily for prophylaxis. Currently on room air.   - D/W Dr. Macias, Pulmonology in rounds today.  - Tacro resumed at a lower dose 10/24  - Hold PTA Bactrim for now per Pulm.  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 CT guided bx 10/8 with purulent fluid positive for Aspergillus fumigatus in cultures. ID was consulted during that admission and patient was started on Voriconazole and is continued on Vori 200mg BID. CT surgery was consulted and discussed significant risk of morbidity w/ surgery and recommended tx w/ antifungal and serial CT scan for f/u.  - Continue Voriconazole 200mg BID for now   - Will need a f/u CT chest for her empyema.  Plan for CT chest once patient is euvolemic and improvement in her ascites fluid.     Hx of low level CMV viremia:   Peak viral load >3M in 8/2018, was on ganciclovir until 7/25/19. Currently biweekly monitoring of low level CMV viremia. Not detected 10/6/19.   - Transplant Pulmonology consulted   - EBV level pending. CMV will be drawn on 10/31     HTN:   On PTA Amlodipine 10mg daily, Carvedilol 25mg BID, and Imdur 120mg daily. Pt reports BP had been increasing and labile over the past 6 months. Was started on Carvedilol, Imdur during previous hospitalization.   -Resumed carvedilol 25 mg twice daily with hold parameters given her episode of A. fib with RVR.  Currently in sinus rhythm.  Continue to hold amlodipine 10 mg and Imdur 120 mg.  - Renal US with doppler reviewed, no hydronephrosis     Gallbladder wall thickening with cholelithiasis  Mild wall thickening of cecus, ascending colon, and splenic flexture:   CT w/ Gallbladder wall thickening w/ cholelithasis. Findings may represent reactive changes to ascites. Acute cholecystitis cannot be ruled out.  This has been evidence on previous imaging studies.  MRI  abd 9/12 w/o choledocholithaisis or obstructing mass. No RUQ pain. No WBC or elevated total bili, ALT or AST. Do not have concern for cholecystitis at this time. CT also with mild wall thickening involving cecus, ascending colon, and splenic flexture could be reactive; vs mild colitis is also on ddx per Radiology. On exam, abdominal exam is benign.   - Monitor     Dermatomyositis  Seronegative RA:   No active treatments. Has had increasing bilateral knee pain which has been assessed with OP MRI. Follows with Rheumatology as OP.      Anemia  Thrombocytopenia:   Hgb 9.4 on admission. BL appears 8-9. Plt 134 on admission. Newly thrombocytopenic since 10/6.   - Trend CBC w diff   - Continue ferrous sulfate daily       Diet: Snacks/Supplements Adult: Boost Shake; Between Meals  2 Gram Sodium Diet    DVT Prophylaxis: Low Risk/Ambulatory with no VTE prophylaxis indicated  Basilio Catheter: not present  Code Status: Full Code      Disposition Plan   Expected discharge: 4 - 7 days, recommended to prior living arrangement once adequate pain management/ tolerating PO medications.  Entered: Chris Navarro MD 10/27/2019, 2:34 PM       The patient's care was discussed with the Bedside Nurse.    Chris Navarro MD  Hospitalist Service, [address], [place]  Please see sticky note for cross cover information  ______________________________________________________________________    Interval History   Kecia feels much improved today.  She is completely asymptomatic and had just gotten back from a walk around the floor when I saw her today.  She is a bit frustrated with her sodium restricted diet.  Appreciate dietary input.  Hoping to urinate so that she does not have to be on dialysis long-term.    Review of systems are otherwise negative for headaches, chest pain, epigastric pain or burning sensation, acid reflux symptoms, abdominal pain, nausea vomiting and diarrhea is at  baseline.    Data reviewed today: I reviewed all medications, new labs and imaging results over the last 24 hours.    Physical Exam   Vital Signs: Temp: 97.2  F (36.2  C) Temp src: Oral BP: 137/85 Pulse: 79 Heart Rate: 81 Resp: 16 SpO2: 96 % O2 Device: None (Room air)    Weight: 130 lbs 8 oz  General Appearance: Alert, well appearing, and in no acute distress  Mental Status: Oriented to person, place, and time  HEENT: No pallor or icterus. Pupils equal and reactive, extraocular eye movements intact. Mucous membranes moist, pharynx normal without lesions. Neck supple, no significant adenopathy, or thyromegaly  Chest: Bilateral equal air entry crackles on inspiration bilaterally, more on the posterior bases,  Heart: Normal S1, S2.  2/6 systolic murmur heard in the tricuspid and pulmonary areas, rubs, clicks or gallops. Normal rate, regular rhythm  Abdomen: Soft, nontender, distended, no masses or organomegaly, Bowel sounds heard, body wall edema +2 in anterior and posterior abdominal wall.  Neurological: Alert, oriented, normal speech, no acute focal findings  Skin and Extremities: Peripheral pulses normal, no pedal edema-however, edema and anasarca noted on upper thighs, no clubbing or cyanosis. No rashes, no suspicious skin lesions noted

## 2019-10-27 NOTE — PROGRESS NOTES
"Nephrology  Brief visit  10/27/19     S: feeling better since starting dialysis.  Edema of legs and abdomen better, appetite better.  No dyspnea, no chest pain no fever or chills.  No N/V.  Diarrhea is stable (ongoing).    /85 (BP Location: Right arm)   Pulse 79   Temp 97.2  F (36.2  C) (Oral)   Resp 16   Ht 1.626 m (5' 4\")   Wt 59.2 kg (130 lb 8 oz)   LMP 06/07/2014 (Exact Date)   SpO2 96%   BMI 22.40 kg/m    Awake alert no distress  Sclera white, pupils equal  Pulm: Normal work of breathing  CV: no edema of legs, improved pre-sacral edema  Neuro: face symmetric, speech fluent    Potassium 4    A/P: RADHA - felt to be due to prolonged high tacrolimus level, remains anuric to oliguric.  Awaiting placement at [facility] dialysis unit  - continue to monitor for renal recovery  - dialysis tomorrow    Nutrition - discussed with primary team - OK for just low salt diet rather than renal diet    Yenni Gamble MD  [facility]  Department of Medicine  Division of Renal Disease and Hypertension  [phone]   "

## 2019-10-27 NOTE — PROGRESS NOTES
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  She was admitted 10/21 with abdominal bloating and increased shortness of breath, s/p paracentesis (10/22) for 2L fluid removal with rapid reaccumulation and need for repeat paracentesis (10/24). Now with symptom improvement since starting HD on 10/25.     Today's recommendations:  - Tacrolimus daily levels to trend (10/27-10/29), ordered  - G6PD (10/27) pending. Plan to start dapsone if G6PD is normal  - Weekly CMV, next due 10/31 (ordered)  - Will follow pending EBV and IgG  - Recommend repeat chest CT this admission for interval f/u of aspergillus empyema, defer until euvolemic  - Agree with right heart cath, but would defer until patient is euvolemic, possibly as OP      S/p bilateral sequential lung transplant (BSLT): Reports increased dyspnea, likely due to pressure on her diaphragm from the recurrent ascites and also possibly from metabolic acidosis from renal dysfunction. No hypoxia, nonproductive congested cough.  CT scan (10/21) with small bilateral pleural effusions).  CXR (10/23) with increase in bilateral pleural effusions R>L as compared with 10/6.  - Volume management and dialysis schedule per primary and nephrology teams  - IgG (10/27) pending     Immunosuppression: On 2-drug IST for recurrent CMV and leukopenia. S/p basiliximab 10/24.  - Tacrolimus 1 mg qAM/ 0.5 mg qPM. Goal 8-10. Level (10/26) 12, dose reduced. Trended level  today 9.0. Will closely monitor tacro levels on 10/28, 10/29 (ordered). Of note, dose held on admission for significantly supratherapeutic level, resumed 10/24.    - Prednisone 5 mg qAM / 2.5 mg qPM    Prophylaxis: Bactrim held since admission d/t RADHA  - G6PD (10/27) pending. Plan to start dapsone if G6PD is normal     CMV reactivation: CMV D+/R+. First positive level 7/9/18, bronch CMV as high as 3.1 million (8/2/18).  Valgancyclovir therapy stopped 7/25.  CMV level has been transiently elevated, but generally has remained low and <137 since 9/24.  - CMV weekly, next due 10/31 (ordered)     Aspergillus empyema: Aspergillus noted in pleural space 10/8.  Has been on voriconazole course since 10/10, LFTs generally stable save for low level alk phos.  F/u EKG after 1 week with QTc 469.  Voriconazole level therapeutic on 10/24.  - Continue voriconazole 200 mg BID, minimum 3 months  - Recommend repeat chest CT this admission, defer until euvolemic     Other problems managed by the primary team:     Ascites: Abdominal CT (10/21) with moderate ascites/anasarca significantly increased from 9/12 and 10/1, gallbladder wall thickening with unchanged cholelithiasis soft tissue density in the medial left costophrenic angle, mild hepatosplenomegaly, and with mild wall thickening in the cecum, ascending colon and splenic flexure.  Abdominal US (10/22) with coarse hepatic echotexture, moderate ascites patent hepatic and portal vessels circumferential gallbladder wall thickening possible sludge versus gallbladder polyp.  Alkaline phosphatase is mildly elevated, remaining liver panel normal.  Ascites is not likely entirely d/t renal failure (see below), particularly since the CT reading suggests there may have been some ascites on previous September and October imaging.  S/p paracentesis 10/22 and repeated 10/24 d/t rapid reaccumulation. Cultures with NGTD. Leukemia/lymphoma evaluation without e/o B-cell non-Hodgkin lymphoma, noted  Polytypic B cells and Hodgkin lymphoma cannot be excluded by flow cytometry, however further w/u deferred as she does not have correlating clinical features. Hepatology consulted 10/24 (see their note), suggest chronic liver disease is likely 2/2 chronically increased right-sided cardiac pressures with resultant passive congestive hepatopathy and hepatic fibrosis.  Some evidence of renal recovery, as creatinine and urine output are improving steadily.  - EBV level (10/24) pending  - Agree with right heart cath, but would defer until patient is euvolemic, possibly as OP  - Management per primary team     Acute on chronic kidney disease: The patient had stage III CKD but appears to have RADHA on admission, possibly d/t supratherapeutic tacrolimus level on admission (31.6, true trough).  It has probably been gradually increasing since the addition of voriconazole at her last visit.  Prior tacrolimus level was 5.3 on 10/10 and she was already developing ascites by 10/13, which seems a little too fast for ascites due to renal failure due to tacrolimus.  Although this is the most likely cause, a broader differential should be maintained. S/p HD line insertion and started iHD 10/25, but volume removal initally limited when she briefly went into A-fib with RVR 10/26, iHD 10/27 was without recurrance.   - Management per nephrology and primary teams,     We appreciate the excellent care provided by the [place] [id number] team. Recommendations communicated via in person rounding and this note. Will continue to follow along closely, please do not hesitate to call with any questions or concerns.     Patient discussed with Dr. Macias.     Juana Ibarra, APRN, CNP   Inpatient Nurse Practitioner  Pulmonary CF/Transplant  Pager [phone]      Subjective & Interval History:     No acute events overnight. Reports ongoing improvement in abdominal discomfort and ability to deep breathe following paracentesis and dialysis. Tolerated HD  "yesterday without recurrance of Afib (1.5 L removed). Ascites and Álvaro thigh edema are decreasing. Chroinic AM cough and sputum production are at baseline. Does continue to have frequent loose stools, about 6-7 times yesterday. Appetite and PO intake is improving. Oliguric, but UOP and Creatinine is steadily improving.    Review of Systems:     C: no fever, no chills, no change in weight, no change in appetite  INTEGUMENTARY/SKIN: no rash or obvious new lesions  ENT/MOUTH: no sore throat, no sinus pain, no nasal congestion or drainage  RESP: see interval history  CV: no chest pain, no palpitations, + peripheral edema, no orthopnea  GI: see interval history  : + oliguria  MUSCULOSKELETAL: no myalgias, no arthralgias  ENDOCRINE: blood sugars with adequate control  NEURO: no headache, no numbness or tingling  PSYCHIATRIC: mood stable    Physical Exam:     Vital signs:  Temp: 97  F (36.1  C) Temp src: Oral BP: 134/82 Pulse: 74 Heart Rate: 81 Resp: 19 SpO2: 96 % O2 Device: None (Room air) Oxygen Delivery: 1/2 LPM Height: 162.6 cm (5' 4\") Weight: 59.2 kg (130 lb 8 oz)  I/O:     Intake/Output Summary (Last 24 hours) at 10/27/2019 1217  Last data filed at 10/27/2019 1100  Gross per 24 hour   Intake 3 ml   Output --   Net 3 ml     Constitutional: Lying in bed, in no apparent distress.   HEENT: Eyes with pink conjunctivae, anicteric. Oral mucosa moist without lesions.   PULM: Diminished airflow and crackles to bases bilaterally. No rhonchi, + wheezes bilateral upper lobes. Non-labored breathing on RA.  CV: Normal S1 and S2. RRR. No murmur, gallop, or rub. 1+ Álvaro thigh edema, mild ascites  ABD: NABS, mildly firm and distended, mild tenderness.    MSK: Moves all extremities. No apparent muscle wasting.   NEURO: Alert and oriented, conversant.   SKIN: Warm, dry. No rash on limited exam.   PSYCH: Mood stable.    Lines, Drains, and Devices:  Peripheral IV 10/21/19 Left Lower forearm (Active)   Site Assessment WDL 10/27/2019 11:00 " AM   Line Status Saline locked 10/27/2019 11:00 AM   Phlebitis Scale 0-->no symptoms 10/27/2019 11:00 AM   Infiltration Scale 0 10/27/2019 11:00 AM   Infiltration Site Treatment Method  None 10/27/2019 11:00 AM   Extravasation? No 10/27/2019 11:00 AM   Number of days: 6       CVC Double Lumen 10/25/19 Right Internal jugular (Active)   Site Assessment WDL 10/27/2019 11:00 AM   Dressing Intervention Chlorhexidine sponge;Transparent 10/21/2019 12:00 AM   Dressing Change Due 11/01/19 10/25/2019  5:10 PM   CVC Comment CDI  10/26/2019 10:26 AM   Lumen A - Color BLUE 10/26/2019 10:26 AM   Lumen A - Status saline locked;cap changed 10/26/2019 10:26 AM   Lumen A - Cap Change Due 11/01/19 10/25/2019  7:00 PM   Lumen B - Color RED 10/26/2019 10:26 AM   Lumen B - Status saline locked;cap changed 10/26/2019 10:26 AM   Lumen B - Cap Change Due 11/01/19 10/25/2019  7:00 PM   Extravasation? No 10/26/2019 10:26 AM   Number of days: 2     Data:     LABS    CMP:   Recent Labs   Lab 10/27/19  0621 10/26/19  0626 10/25/19  0733 10/24/19  2032  10/23/19  0322 10/22/19  1314 10/21/19  1752 10/21/19  1451    140 137 136   < > 135 133 133 132*   POTASSIUM 4.0 4.2 4.8 4.6   < > 4.8 4.5 5.0 4.5   CHLORIDE 107 111* 112* 110*   < > 109 109 109 107   CO2 20 19* 14* 14*   < > 11* 13* 10* 14*   ANIONGAP 8 10 11 12   < > 15* 12 13 12   * 127* 131* 118*   < > 139* 127* 138* 111*   BUN 31* 49* 66* 69*   < > 70* 66* 66* 67*   CR 3.56* 4.80* 6.16* 6.37*   < > 6.62* 6.18* 5.76* 5.90*   GFRESTIMATED 13* 9* 7* 7*   < > 6* 7* 8* 7*   GFRESTBLACK 16* 11* 8* 8*   < > 7* 8* 9* 8*   CHELSEY 7.9* 8.1* 8.3* 8.2*   < > 8.0* 8.2* 8.0* 8.5   MAG  --   --   --   --   --  2.0 2.1 2.0  --    PHOS 4.2  --   --   --   --  7.4* 7.3* 6.7*  --    PROTTOTAL  --   --   --  6.9  --  6.3* 6.7*  --  7.2   ALBUMIN  --   --   --  2.6*  --  2.6* 2.7*  --  3.0*   BILITOTAL  --   --   --  0.2  --  0.3 0.4  --  0.3   ALKPHOS  --   --   --  227*  --  174* 181*  --  194*   AST   --   --   --  21  --  12 19  --  18   ALT  --   --   --  13  --  13 18  --  16    < > = values in this interval not displayed.     CBC:   Recent Labs   Lab 10/26/19  0626 10/24/19  2032 10/23/19  0322 10/22/19  1314   WBC 6.8 9.1 5.9 5.3   RBC 3.61* 3.98 3.27* 3.18*   HGB 10.1* 11.3* 9.1* 9.0*   HCT 34.1* 37.3 30.4* 29.7*   MCV 95 94 93 93   MCH 28.0 28.4 27.8 28.3   MCHC 29.6* 30.3* 29.9* 30.3*   RDW 19.9* 19.9* 19.6* 19.5*   * 160 140* 142*       INR:   Recent Labs   Lab 10/22/19  1314   INR 1.12       Glucose:   Recent Labs   Lab 10/27/19  0621 10/26/19  0626 10/25/19  0733 10/24/19  2032 10/24/19  0557 10/23/19  0322   * 127* 131* 118* 89 139*       Blood Gas:   Recent Labs   Lab 10/21/19  1459   PHV 7.25*   PCO2V 29*   PO2V 35   HCO3V 13*       Culture Data   Recent Labs   Lab 10/22/19  1430   CULT No growth       Virology Data:   Lab Results   Component Value Date    FLUAH1 Negative 10/07/2019    FLUAH3 Negative 10/07/2019    WO9914 Negative 10/07/2019    IFLUB Negative 10/07/2019    RSVA Negative 10/07/2019    RSVB Negative 10/07/2019    PIV1 Negative 10/07/2019    PIV2 Negative 10/07/2019    PIV3 Negative 10/07/2019    HMPV Negative 10/07/2019    HRVS Negative 10/07/2019    ADVBE Negative 10/07/2019    ADVC Negative 10/07/2019    ADVC Negative 03/07/2019    ADVC Negative 01/17/2019       Historical CMV results (last 3 of prior testing):  Lab Results   Component Value Date    CMVQNT CMV DNA Not Detected 10/24/2019    CMVQNT CMV DNA Not Detected 10/06/2019    CMVQNT <137 (A) 09/12/2019     Lab Results   Component Value Date    CMVLOG Not Calculated 10/24/2019    CMVLOG Not Calculated 10/06/2019    CMVLOG <2.1 09/12/2019       Urine Studies    Recent Labs   Lab Test 10/21/19  2240 09/12/19  0125   URINEPH 5.0 7.5*   NITRITE Negative Negative   LEUKEST Large* Negative   WBCU 115* 3       Most Recent [place] Pulmonary Function Testing (FVC/FEV1 only)  FVC-Pre   Date Value Ref Range Status    08/07/2019 2.22 L    06/05/2019 2.26 L    03/05/2019 1.97 L    01/16/2019 1.92 L      FVC-%Pred-Pre   Date Value Ref Range Status   08/07/2019 67 %    06/05/2019 70 %    03/05/2019 60 %    01/16/2019 59 %      FEV1-Pre   Date Value Ref Range Status   08/07/2019 1.60 L    06/05/2019 1.65 L    03/05/2019 1.31 L    01/16/2019 1.04 L      FEV1-%Pred-Pre   Date Value Ref Range Status   08/07/2019 61 %    06/05/2019 64 %    03/05/2019 51 %    01/16/2019 40 %        IMAGING    Recent Results (from the past 48 hour(s))   IR CVC Tunnel Placement > 5 Yrs of Age    Narrative    PROCEDURES:  1. Ultrasound guidance for venous access  2. Placement centrally inserted catheter (age > 5 yrs.) tunneled, no  port, no pump  3. Fluoroscopic guidance placement    Clinical History: Renal failure, requires access for dialysis.  Tunneled dialysis catheter placement requested.    Comparisons: 10/8/2019    Staff Radiologist: Obie Dawson MD    Fellow(s)/Resident(s): MD Morgan García MD    I, OBIE DAWSON MD, attest that I was present for all critical portions  of the procedure and was immediately available to provide guidance and  assistance during the remainder of the procedure.    Medications: The patient was placed on continuous monitoring.  Intravenous sedation was administered. 2 mg Versed and 100 mcg  fentanyl IV. Vital signs and sedation monitored by nursing staff under  Interventional Radiologists supervision. The patient remained stable  throughout the procedure.    Attending face-to-face sedation time: Less than 5 minutes.    Fluoroscopy time: 1.7 minutes.    PROCEDURE: The patient understood the limitations, alternatives, and  risks of the procedure and requested the procedure be performed. Both  written and oral consent were obtained.    Limited jugular ultrasound documented jugular vein patency.    The right neck and upper chest were prepped and draped in the usual  sterile fashion. 1% lidocaine without  epinephrine was used for local  anesthesia.     Under ultrasound guidance, right internal jugular venotomy was made  with a micropuncture needle. Permanent copy of the image documenting  the vein was entered into the patients record.    Micropuncture needle exchanged over guidewire for the micropuncture  sheath under fluoroscopic guidance. Catheter length measured with the  0.018 guidewire. Micropuncture sheath saline locked. A 23 cm tip to  cuff 14.5 Palindrome dialysis catheter was subcutaneously tunneled  from the right anterior chest to the right internal jugular venotomy  site. Micropuncture sheath exchanged over guidewire for the peel-away  sheath. Guidewire removed. Under fluoroscopic guidance, the catheter  placed through a peel-away sheath and positioned with its tip in the  right atrium.     Fluoroscopic image documenting catheter placement a position was saved  in the patient's record.    Both lumens flushed and aspirated adequately. Each lumen heparin  locked with 1000:1 heparin solution. 2-0 nylon catheter retaining  suture and sterile dressing applied. Right internal jugular venotomy  site closed with Dermabond and 3-0 Vicryl deep dermal suture. No  immediate complication.      Impression    IMPRESSION:   1. Ultrasound guided right internal jugular venotomy.  2. Twenty-three cm tip to cuff 14.5 [place] Vaxcel tunneled dialysis  catheter placed under fluoroscopic guidance with the tip in the right  atrium. Both lumens heparin locked and ready for use.    I have personally reviewed the examination and initial interpretation  and I agree with the findings.    OBIE DAWSON MD

## 2019-10-27 NOTE — PROGRESS NOTES
CLINICAL NUTRITION SERVICES    Reason for Assessment:  HD diet nutrition education. However, ran into ordering provider outside of pt room and after discussion with Nephrology, only need for 2g Na+ diet.     Diet History:  Pt reports no history of receiving 2g Na+ nutrition education in the past. Rarely eats out, usually prepares food at home and adds minimal amount of table salt when cooking. She shares some decreased appetite/early satiety with ascites but appetite much improved since admission. HD started 10/25. Pt with RADHA on CKD stage III.    Labs:  -K 4.0 (WNL)  -Phos 4.2 (WNL, previously hyperphosphatemia prior to HD initiation)  -BUN 31 (H, downtrended)  -Cr 3.56 (H, downtrended)    Nutrition Diagnosis:  Food- and nutrition-related knowledge deficit r/t no previous knowledge of 2g Na+ diet AEB pt report of no previous formal low sodium nutrition education.    Nutrition Prescription/Recs:  Liberalize diet to low Na+ per Nephrology.      Interventions:  Nutrition Education  1. Provided verbal instruction on 2g Na+ diet ed. Also encouraged adequate protein intake especially on dialysis (gave protein goal of at least 70 g (1.2 g pro/kg)).    2. Provided the following handouts: Tips for a Low-Sodium Diet and Protein and Dialysis.  Reviewed menu and where to locate Na+ content.    3. Continue Boost shakes as ordered - pt enjoys these (drinking at least one/day).     Goals:    Pt will verbalize at least five high sodium foods and the importance of avoiding added salt to foods for cooking or seasoning foods.     Follow-up:   Patient to ask any further nutrition-related questions before discharge. In addition, pt may request outpatient RD appointment.    Nasreen Hogan RD, LD  Weekend Pager: [phone]

## 2019-10-27 NOTE — PROGRESS NOTES
"/85 (BP Location: Right arm)   Pulse 79   Temp 97.2  F (36.2  C) (Oral)   Resp 16   Ht 1.626 m (5' 4\")   Wt 59.2 kg (130 lb 8 oz)   LMP 06/07/2014 (Exact Date)   SpO2 96%   BMI 22.40 kg/m      Time 2616-0081      Reason for admission: Acute kidney injury   Vitals: VSS on RA  Activity: Up Ind   Pain: Denies pain  Neuro: A&Ox4, speech logical  Mood/Behavior: calm, cooperative  Cardiac: RRR, no edema BLE  Respiratory: LS clear  GI/: Loose BM's- per pt, oliguric  Diet: 2grNa  Lines: PIV, CVC  Skin: CDI  Labs/imaging: Cr 3.56, Tacrolimus 9.0      New changes this shift: Pt diet was changed to 2grNa.       Plan: Awaiting open chair for HD near home town. Continue to monitor and follow POC.          "

## 2019-10-27 NOTE — PLAN OF CARE
"BP: 137/85  P: 79  Temp: 97.2 degrees F  RR: 16  Wt: 59.19 kg  BMI: 22.4 kg/m2     Activity: Up [place], patient steady and moving independently. Ambulated in hallway this morning.   Pain: Denies this shift.  Neuro: A&Ox4, nerves intact.  Cardiac: WDL.  Resp: Some dyspnea on exertion, LLL and RLL coarse crackles, diminished lung sounds. Pt has intermittent cough.   GI/: Abdomen rounded, distended secondary to ascites, edema. Says she has 3-4 BM's a day typically that are loose diarrhea.   Diet: 2 gram sodium.   Lines: PIV on L forearm, saline locked. Tunneled CVC in R internal jugular vein, saline locked.   Labs: Creatinine 3.56, BUN 31.   New this shift: Nutrition consult ordered, pt was changed from renal/dialysis diet to 2 gram sodium diet.   Plan: Discharge is pending outpatient dialysis. Continue to monitor.      SOAP note:  S: \"I'm so glad I have more options for my diet\" \"I got some answers\"   O: Patient's appetite was increased this morning, ate 100% of breakfast.   A: Patient is tolerating new diet well; feels liberated.   P: Continue to guide food choices and encourage preferences within guidelines.    - SN Krystyna.   "

## 2019-10-27 NOTE — PLAN OF CARE
1900 - 0730:     VSS on RA. Tele on - NSR with BBB. A&Ox4. PIV SL. Pt denies pain. Independent. Dialysis diet. Tolerating well. Ate about 25% of dinner. Voiding WNL. Pt denies BM overnight. RNCC coordinating outpatient HD - will discharge when plan in place. Will continue to monitor.

## 2019-10-28 LAB
ANION GAP SERPL CALCULATED.3IONS-SCNC: 9 MMOL/L (ref 3–14)
BASOPHILS # BLD AUTO: 0 10E9/L (ref 0–0.2)
BASOPHILS NFR BLD AUTO: 0.4 %
BUN SERPL-MCNC: 38 MG/DL (ref 7–30)
CALCIUM SERPL-MCNC: 7.9 MG/DL (ref 8.5–10.1)
CHLORIDE SERPL-SCNC: 105 MMOL/L (ref 94–109)
CO2 SERPL-SCNC: 20 MMOL/L (ref 20–32)
CREAT SERPL-MCNC: 3.94 MG/DL (ref 0.52–1.04)
DIFFERENTIAL METHOD BLD: ABNORMAL
EBV DNA # SPEC NAA+PROBE: ABNORMAL {COPIES}/ML
EBV DNA SPEC NAA+PROBE-LOG#: 4.1 {LOG_COPIES}/ML
EOSINOPHIL # BLD AUTO: 0.1 10E9/L (ref 0–0.7)
EOSINOPHIL NFR BLD AUTO: 2.3 %
ERYTHROCYTE [DISTWIDTH] IN BLOOD BY AUTOMATED COUNT: 19.6 % (ref 10–15)
GAMMA INTERFERON BACKGROUND BLD IA-ACNC: 0.07 IU/ML
GFR SERPL CREATININE-BSD FRML MDRD: 12 ML/MIN/{1.73_M2}
GLUCOSE SERPL-MCNC: 127 MG/DL (ref 70–99)
HCT VFR BLD AUTO: 28.6 % (ref 35–47)
HGB BLD-MCNC: 8.7 G/DL (ref 11.7–15.7)
IGG SERPL-MCNC: 936 MG/DL (ref 695–1620)
IMM GRANULOCYTES # BLD: 0 10E9/L (ref 0–0.4)
IMM GRANULOCYTES NFR BLD: 0.2 %
LYMPHOCYTES # BLD AUTO: 0.4 10E9/L (ref 0.8–5.3)
LYMPHOCYTES NFR BLD AUTO: 8.4 %
M TB IFN-G BLD-IMP: NEGATIVE
M TB IFN-G CD4+ BCKGRND COR BLD-ACNC: 1.18 IU/ML
MCH RBC QN AUTO: 28.3 PG (ref 26.5–33)
MCHC RBC AUTO-ENTMCNC: 30.4 G/DL (ref 31.5–36.5)
MCV RBC AUTO: 93 FL (ref 78–100)
MITOCHONDRIA M2 IGG SER-ACNC: 1 U/ML
MITOGEN IGNF BCKGRD COR BLD-ACNC: 0 IU/ML
MITOGEN IGNF BCKGRD COR BLD-ACNC: 0 IU/ML
MONOCYTES # BLD AUTO: 0.5 10E9/L (ref 0–1.3)
MONOCYTES NFR BLD AUTO: 9.6 %
NEUTROPHILS # BLD AUTO: 4.1 10E9/L (ref 1.6–8.3)
NEUTROPHILS NFR BLD AUTO: 79.1 %
NRBC # BLD AUTO: 0 10*3/UL
NRBC BLD AUTO-RTO: 0 /100
PHOSPHATE SERPL-MCNC: 4.6 MG/DL (ref 2.5–4.5)
PLATELET # BLD AUTO: 63 10E9/L (ref 150–450)
PLATELET # BLD EST: ABNORMAL 10*3/UL
POTASSIUM SERPL-SCNC: 4.2 MMOL/L (ref 3.4–5.3)
RBC # BLD AUTO: 3.07 10E12/L (ref 3.8–5.2)
SMA IGG SER-ACNC: 3 UNITS (ref 0–19)
SODIUM SERPL-SCNC: 134 MMOL/L (ref 133–144)
TACROLIMUS BLD-MCNC: 8.3 UG/L (ref 5–15)
TME LAST DOSE: NORMAL H
WBC # BLD AUTO: 5.2 10E9/L (ref 4–11)

## 2019-10-28 PROCEDURE — 99232 SBSQ HOSP IP/OBS MODERATE 35: CPT | Performed by: HOSPITALIST

## 2019-10-28 PROCEDURE — 80197 ASSAY OF TACROLIMUS: CPT | Performed by: NURSE PRACTITIONER

## 2019-10-28 PROCEDURE — 25000128 H RX IP 250 OP 636: Performed by: INTERNAL MEDICINE

## 2019-10-28 PROCEDURE — 25000131 ZZH RX MED GY IP 250 OP 636 PS 637: Performed by: PHYSICIAN ASSISTANT

## 2019-10-28 PROCEDURE — 36415 COLL VENOUS BLD VENIPUNCTURE: CPT | Performed by: HOSPITALIST

## 2019-10-28 PROCEDURE — 12000001 ZZH R&B MED SURG/OB UMMC

## 2019-10-28 PROCEDURE — 25000132 ZZH RX MED GY IP 250 OP 250 PS 637: Performed by: HOSPITALIST

## 2019-10-28 PROCEDURE — 25000132 ZZH RX MED GY IP 250 OP 250 PS 637: Performed by: PHYSICIAN ASSISTANT

## 2019-10-28 PROCEDURE — 25000131 ZZH RX MED GY IP 250 OP 636 PS 637: Performed by: NURSE PRACTITIONER

## 2019-10-28 PROCEDURE — 90937 HEMODIALYSIS REPEATED EVAL: CPT

## 2019-10-28 PROCEDURE — 80048 BASIC METABOLIC PNL TOTAL CA: CPT | Performed by: HOSPITALIST

## 2019-10-28 PROCEDURE — 84100 ASSAY OF PHOSPHORUS: CPT | Performed by: HOSPITALIST

## 2019-10-28 PROCEDURE — 85025 COMPLETE CBC W/AUTO DIFF WBC: CPT | Performed by: NURSE PRACTITIONER

## 2019-10-28 RX ADMIN — PANTOPRAZOLE SODIUM 40 MG: 40 TABLET, DELAYED RELEASE ORAL at 19:51

## 2019-10-28 RX ADMIN — MELATONIN TAB 3 MG 3 MG: 3 TAB at 22:07

## 2019-10-28 RX ADMIN — PREDNISONE 5 MG: 5 TABLET ORAL at 07:53

## 2019-10-28 RX ADMIN — MULTIPLE VITAMINS W/ MINERALS TAB 1 TABLET: TAB at 08:20

## 2019-10-28 RX ADMIN — CARVEDILOL 25 MG: 25 TABLET, FILM COATED ORAL at 18:07

## 2019-10-28 RX ADMIN — CARVEDILOL 25 MG: 25 TABLET, FILM COATED ORAL at 08:20

## 2019-10-28 RX ADMIN — SODIUM CHLORIDE 250 ML: 9 INJECTION, SOLUTION INTRAVENOUS at 15:07

## 2019-10-28 RX ADMIN — VORICONAZOLE 200 MG: 200 TABLET, FILM COATED ORAL at 08:20

## 2019-10-28 RX ADMIN — SODIUM CHLORIDE 300 ML: 9 INJECTION, SOLUTION INTRAVENOUS at 15:07

## 2019-10-28 RX ADMIN — VORICONAZOLE 200 MG: 200 TABLET, FILM COATED ORAL at 19:51

## 2019-10-28 RX ADMIN — TACROLIMUS 0.5 MG: 0.5 CAPSULE ORAL at 18:07

## 2019-10-28 RX ADMIN — FERROUS SULFATE TAB 325 MG (65 MG ELEMENTAL FE) 325 MG: 325 (65 FE) TAB at 07:53

## 2019-10-28 RX ADMIN — MIRTAZAPINE 15 MG: 15 TABLET, FILM COATED ORAL at 22:07

## 2019-10-28 RX ADMIN — TACROLIMUS 1 MG: 1 CAPSULE ORAL at 07:53

## 2019-10-28 RX ADMIN — Medication 1 TABLET: at 08:20

## 2019-10-28 RX ADMIN — PANTOPRAZOLE SODIUM 40 MG: 40 TABLET, DELAYED RELEASE ORAL at 07:53

## 2019-10-28 RX ADMIN — PREDNISONE 2.5 MG: 2.5 TABLET ORAL at 19:51

## 2019-10-28 ASSESSMENT — ACTIVITIES OF DAILY LIVING (ADL)
ADLS_ACUITY_SCORE: 11

## 2019-10-28 ASSESSMENT — MIFFLIN-ST. JEOR
SCORE: 1171.48
SCORE: 1149
SCORE: 1169

## 2019-10-28 NOTE — TELEPHONE ENCOUNTER
diamond  RECORDS RECEIVED FROM: Internal - 6-8 week hospital follow up from [facility] // per Dr. Mas staff msg   DATE RECEIVED: 12.18.2019   NOTES STATUS DETAILS   OFFICE NOTE from referring provider N/A    OFFICE NOTES from other specialists N/A    DISCHARGE SUMMARY from hospital Internal 10.21.2019   MEDICATION LIST Internal    LIVER BIOSPY (IF APPLICABLE)      PATHOLOGY REPORTS  N/A    IMAGING     ENDOSCOPY (IF AVAILABLE) Internal 10.29.2018   COLONOSCOPY (IF AVAILABLE) N/A    ULTRASOUND LIVER Internal 10.22.2019   CT OF ABDOMEN Internal 10.21.2019   MRI OF LIVER N/A    FIBROSCAN, US ELASTOGRAPHY, FIBROSIS SCAN, MR ELASTOGRAPHY N/A    LABS     HEPATIC PANEL (LIVER PANEL) Internal 10.23.2019   BASIC METABOLIC PANEL Internal 10.28.2019   COMPLETE METABOLIC PANEL N/A    COMPLETE BLOOD COUNT (CBC) Internal 10.28.2019   INTERNATIONAL NORMALIZED RATIO (INR) Internal 10.22.2019   HEPATITIS C ANTIBODY Internal 06.05.2019   HEPATITIS C VIRAL LOAD/PCR N/A    HEPATITIS C GENOTYPE N/A    HEPATITIS B SURFACE ANTIGEN Internal 10.28.2019  10.25.2019   HEPATITIS B SURFACE ANTIBODY Internal 10.28.2019  10.25.2019   HEPATITIS B DNA QUANT LEVEL N/A    HEPATITIS B CORE ANTIBODY Internal 06.05.2019

## 2019-10-28 NOTE — PROGRESS NOTES
Care Coordinator - Discharge Planning    Admission Date/Time:  10/21/2019  Attending MD:  Chris Navarro*     Data  Chart reviewed, discussed with interdisciplinary team.   Patient was admitted for:   1. Acute kidney injury (H)    2. S/P lung transplant (H)         Assessment   Full assessment completed in previous note    Spoke with Amarilys, admission coordinator, with [facility] and confirmed outpatient dialysis chair time for MWF at 1:00 PM. Patient could start on 10/30. Team notified and anticipate discharge tomorrow as long as labs are stable.     [facility]  [address] [place], [place]    Patient made aware and had no other discharge concerns. RNCC will continue to follow.    Coordination of Care and Referrals: Provided patient/family with options for Dialysis Services.      Plan  Anticipated Discharge Date:  10/29/19  Anticipated Discharge Plan:  Home with outpatient dialysis.    Halie Kahn RN, A  Internal Medicine Care Coordinator  Phone: [phone] Pager: [phone]  [facility]     To contact Weekend RNCC, dial [phone] and enter job code [phone] at prompt.   This pager can not be contacted by text page or outside line.

## 2019-10-28 NOTE — PLAN OF CARE
"BP (!) 142/98   Pulse 84   Temp 97.8  F (36.6  C) (Oral)   Resp 16   Ht 1.626 m (5' 4\")   Wt 59.4 kg (130 lb 15.3 oz)   LMP 06/07/2014 (Exact Date)   SpO2 99%   BMI 22.48 kg/m       Time: 9144-0077    Reason for admission: RADHA & CKD stage IV  Activity: Independent, SOB on exertion    Pain: Denies   Neuro: A&O x4  Cardiac: HTN other VSS, 1+ edema located on lower extremities, NSR on tele   Respiratory: pt reports coughing up \"phlem\", Left lobes: crackles, Right lobes: diminished   GI/: loose stools x1- pt reports normal. Oliguric. ABD round & distended   Diet: 2 gram sodium diet, tolerating   Lines: CVC- reinforced dressing; HD nurse notified, PIV- saline locked  Wounds: Redness on coccyx area, mepeix applied   Labs/Imaging: Tacrolimus: 8.3, Creatinine: 3.94    New changes this shift: Pt dialyzed today, HIT screen planned for 10/29 during AM draws.      Plan: HIT screen 10/29. Awaiting placement for outpatient HD.    Continue to monitor and follow POC     "

## 2019-10-28 NOTE — PLAN OF CARE
"BP (!) 123/96   Pulse 86   Temp 97.8  F (36.6  C) (Oral)   Resp 16   Ht 1.626 m (5' 4\")   Wt 59.4 kg (130 lb 15.3 oz)   LMP 06/07/2014 (Exact Date)   SpO2 98%   BMI 22.48 kg/m       Time: 8917-2417     Reason for admission: RADHA with Renal Failure  Vitals: Hypertensive on RA. Other VSS.  Activity: Independent. Dyspnea on exertion.  Pain: No complaints of pain.  Neuro: WDL  Cardiac: Hypertensive. MD aware. On tele, NSR.  Respiratory: Crackles present. Diminished in bilateral LL's  GI/: Oliguric to anuric. Diarrhea present, pt reports baseline.  Diet: 2 gram Na diet, low appetite.  Lines: Right CVC for HD, dressing changed in dialysis. PIV SL.   Wounds: Reddened on coxxys, mepilex in place.  Labs/imaging: Creat elevated @ 3.94. Tacro levels checked daily @ 9.0. Hgb down @ 8.7, stable.      New changes this shift: Dialyzed this afternoon, removed 2 L. HIT screen for AM draw. Pt accepted to [facility] on 10/30 @ 1300. Could discharge then if hemodynamically stable.      Plan:.To discharge home with OP dialysis once medically stable.      Continue to monitor and follow POC   "

## 2019-10-28 NOTE — PLAN OF CARE
1900 - 0730:     VSS on RA. Tele on - NSR with BBB. A&Ox4. PIV SL. Pt denies pain. Independent. 2g Na diet. Tolerating well. Ate 100% of dinner. Voiding WNL. Pt denies BM overnight. RNCC coordinating outpatient HD - will discharge when plan in place. Will continue to monitor.

## 2019-10-28 NOTE — PROGRESS NOTES
[facility] Progress Note - Hospitalist Service, Gold 10       Date of Admission:  10/21/2019  Assessment & Plan     Discharge planning medications:  - Continue Voriconazole 200mg BID  - Discuss with Tx ID re. Dose of tacrolimus  - Bactrim needs to be substituted with Dapsone or Pentamidine per Tx  - Currently holding Amlodipine and Imdur. Discuss with renal re. restarting  - Resume Protonix, Coreg, Prednisone, MVI, Mirtazipine, Dronabinol, Ca-Vit D,Iron    Update: Patient has a HD chair for MWF HD. Only limiting factor is her thrombocytopenia. If she discharges tomorrow, can continue with her OP transplant ID visit. 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 Baseline creatinine was 1.8 in Oct 2019 visit   Recent admissions: September 2019- Admitted for left sided chest pain. Had an incidental empyema that was monitored w/o Abx. Oct 6 2019- Admitted with chest pain, aspiration showed aspergillus, CA'ed on Voriconazole     Paroxysmal Afib  - After dialysis, had a run of A. fib with RVR on 10/25  - Resume to her home Coreg 25 mg twice daily. In NSR since 10/26  - Has a history of paroxysmal A. Fib. Not on any anticoagulation. Her [id number] score is 2- for HTN and sex (female). The evidence for AC on paroxysmal Afib is not strong. No clear studies demonstrating benefit vs risk of bleeding. Other than her low platelets, she currently does not have any bleeding risk factors.  - Given her ESRD, she would only qualify for Coumadin at this time.   - No urgent indication for anticoagulation. I  have discussed the risks/benefits and uncertainties of Rx with patient. She would like to avoid coumadin if possible, but will take it if it is absolutely necessary     Oliguric RADHA on CKD stage IV  Has had an elevated Cr of 1.2-1.4 since 6/2018. Had seen Nephrology Dr. Gamble on 8/7/19 for elevated creatinine to 1.8. Renal biopsy 8/2019 with arterionephrosclerosis and features concerning for calcineurin toxicity (tacrolimus).   - Of note, patient was started on Voriconazole during recent admission 10/6 for empyema with cx + aspergillus.   - Pt presented with oliguria, elevated Creatinine despite 3 day OP IV fluid bolus challenge (10/18-20).   - CT scan on admission unremarkable for hydronephrosis or obstructing stone.   - Did report decreased PO intake and fluid intake due to abdominal bloating, and CT w/ new onset ascites.  - DDx for RADHA includes  - Medication induced including tacrolimus toxicity in setting of voriconazole  - Also has a history of worsening HTN over the past 6 months, and was recently started on Imdur, carvedilol, in addition to PTA Amlodipine, therefore, it could be an additional component of relative hypotension  - interstitial nephritis from bactrim (however, this has been chronic medication)  - UTI-very less likely vs pre-renal 2/2 poor oral intake. No s/sx of obstruction.   - Renal US w/ doppler to assess for FRANCHESCA- no evidence of stenosis  Plan:  -Given her FeNa of less than 1%, initial volume challenge given on admission with no improvement in urine output.  She was also given diuretic doses on 10/23 and 10/24.  With minimal urine output of less than 300 cc/day.  -She had paracentesis therapeutic on 10/23 and 10/24 with rapid reaccumulation of ascites and minimal relief.  -Nephrology team consulted appreciate recommendations.  - After discussion with IR, hemodialysis catheter placed on 10/25, had and tolerated her first session of dialysis on 10/25 with a second session on 10/26, 3rd  session on 10/28.  Hepatitis B panel and QuantiFERON gold sent.  Plans for outpatient hemodialysis schedule are being made by care coordinator. I gave verbal handoff report to Dr. Yang, nephrologist at the [facility].   - Discontinued all of her BP medications except for Coreg, will CTM BP and resume as needed. Permissive hypertension for now  - IV Ceftriaxone started on 10/23 on admission with concern for UTI/pyelonephritis as a possible cause of her RADHA.  Discontinued on 10/24 given no clear evidence of UTI.    - Tacrolimus was held on admission given concern for toxicity.  Restarted on 10/24 at a reduced dose. Basiliximab 10/24 x 1 dose given. Tacrolimus 1 mg qAM/ 0.5 mg qPM started. Goal 8-10. Level (10/26) 12, dose reduced. Trended level  9.0 on 10/27  - Monitor tacro levels on 10/28, 10/29 . Dose adjustments to be made by the Pulmonary team     New onset ascites:   Pt reports 23lb weight gain in 3 days prior to admission with CT showing e/o moderate ascites/anasarca  No hx of cirrhosis. CT w/ mild hepatosplenomegaly. Reporeds abdominal bloating, early satiety and decreased appetite. No abdominal pain or tenderness, fever or leukocytosis.  Abdominal ultrasound done on admission 10/23 shows ascites and possible evidence of liver disease.  No evidence of portal thrombus.  DDx:  -Portal hypertension due to cardiac cirrhosis from right-sided heart failure due to lung disease- Most likely based on current clinical information  -Secondary to overall volume overload state from renal failure  -Malignancy- unlikely given ascitic fluid cytology is negative and Lymphoma/Leukemia panel is negative  Plan:  -Diagnostic paracentesis done on 10/23.  No evidence of SBP.  -Hepatology consulted appreciate recommendations.  Patient will eventually need a right heart cath and a liver biopsy to determine the etiology  and diagnosis of her ascites.  However, patient is currently hypervolemic.  Right heart cath should  be done once she is dry/euvolemic for diagnostic accuracy of pulmonary hypertension.  Which would be determined in concert with nephrology.  If this exam is essentially normal and does not demonstrate significantly elevated right-sided cardiac pressures, would then proceed with a transjugular liver biopsy with portal pressure measurements to determine etiology of cirrhosis. If elevated R -sided pressures, most likely cardiac cirrhosis   - IgG, F-actin and AMA sent for autoimmune w/u of liver Dz -pending results. If they do not result by the time the patient is discharged, will be followed-up by Pulmonary as an OP. If these are positive, she will need referral to hepatology  -Prior to admission, patient stopped her mirtazapine as she thought her bloating was a side effect.  Given that now we know this is secondary to ascites, resumed at home mirtazapine.     Hx of Bilateral Lung transplant for ILD:   Transplanted in 3/2018; course c/b R main bronchial stenosis requiring repeated dilatation, + DSAs, CMV viremia s/p valganciclovir with undetectable viral loads off therapy. Not on PTA O2 use. On PTA prednisone 5mg qAM and 2.5mg qPM and tacrolimus 2.5mg qAM and 2.0mg qPM and Bactrim 400-80mg daily for prophylaxis. Currently on room air.   - D/W  Pulmonology in rounds today.  - Tacro resumed at a lower dose 10/24  - Hold PTA Bactrim for now per Pulm.  We are checking a G6PD in case she needs Dapsone instead. 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 CT guided bx 10/8 with purulent fluid positive for Aspergillus fumigatus in cultures.  ID was consulted during that admission and patient was started on Voriconazole and is continued on Vori 200mg BID. CT surgery was consulted and discussed significant risk of morbidity w/ surgery and recommended tx w/ antifungal and serial CT scan for f/u.  - Continue Voriconazole 200mg BID for now   - Will need a f/u CT chest for her empyema.  Plan for CT chest once patient is euvolemic and improvement in her ascites fluid. If patient remains inpatient till Oct 30th, please consult transplant ID given her OP appointment for transplant ID was on 10/30. When we speak with them, also need to determine if they would like to have a CT chest inpatient for surveillance or if she can obtain it OP prior to next visit     Hx of low level CMV viremia:   Peak viral load >3M in 8/2018, was on ganciclovir until 7/25/19. Currently biweekly monitoring of low level CMV viremia. Not detected 10/6/19.   - Transplant Pulmonology consulted   - EBV level pending. CMV will be drawn on 11/7      HTN:   On PTA Amlodipine 10mg daily, Carvedilol 25mg BID, and Imdur 120mg daily. Pt reports BP had been increasing and labile over the past 6 months. Was started on Carvedilol, Imdur during previous hospitalization.   -Resumed carvedilol 25 mg twice daily with hold parameters given her episode of A. fib with RVR.  Currently in sinus rhythm.  Continue to hold amlodipine 10 mg and Imdur 120 mg.  - Renal US with doppler reviewed, no hydronephrosis     Gallbladder wall thickening with cholelithiasis  Mild wall thickening of cecus, ascending colon, and splenic flexture:   CT w/ Gallbladder wall thickening w/ cholelithasis. Findings may represent reactive changes to ascites. Acute cholecystitis cannot be ruled out.  This has been evidence on previous imaging studies.  MRI abd 9/12 w/o choledocholithaisis or obstructing mass. No RUQ pain. No WBC or elevated total bili, ALT or AST. Do not have concern for cholecystitis at this time. CT also with mild wall  thickening involving cecus, ascending colon, and splenic flexture could be reactive; vs mild colitis is also on ddx per Radiology. On exam, abdominal exam is benign.   - Monitor     Dermatomyositis  Seronegative RA:   No active treatments. Has had increasing bilateral knee pain which has been assessed with OP MRI. Follows with Rheumatology as OP.      Anemia  Thrombocytopenia:   Hgb 9.4 on admission. BL appears 8-9. Plt 134 on admission. Newly thrombocytopenic since 10/6.   - This is worse today at 63. Unclear reason. Will closely monitor for now  - Trend CBC w diff   - Continue ferrous sulfate daily      Diet: Snacks/Supplements Adult: Boost Shake; Between Meals  2 Gram Sodium Diet    DVT Prophylaxis: Low Risk/Ambulatory with no VTE prophylaxis indicated  Basilio Catheter: not present  Code Status: Full Code      Disposition Plan   Expected discharge: 2 - 3 days, recommended to prior living arrangement once platelets improve and patint   Entered: Chris Navarro MD 10/28/2019, 10:59 AM       The patient's care was discussed with the Bedside Nurse.    Chris Navarro MD  Hospitalist Service, [address]  Please see sticky note for cross cover information  ______________________________________________________________________    Interval History   Patient feels great today. She feels good enough to discharge, but knows we are awaiting HD placement. She has no other complaints, sleeping well on mirtazipine, no dyspnea with exertion, walking in the halls, no light headedness, no chest pain, no abdominal pain/ GERD, no NV. Diarrhea at baseline  Swelling overall feels improved    Data reviewed today: I reviewed all medications, new labs and imaging results over the last 24 hours    .Physical Exam   Vital Signs: Temp: 96.8  F (36  C) Temp src: Axillary BP: (!) 142/91 Pulse: 79 Heart Rate: 89 Resp: 18 SpO2: 92 % O2 Device: None (Room air)    Weight: 131  lbs 8 oz  General Appearance: Alert, well appearing, and in no acute distress  Mental Status: Oriented to person, place, and time  HEENT: No pallor or icterus. Pupils equal and reactive, extraocular eye movements intact. Mucous membranes moist, pharynx normal without lesions. Neck supple, no significant adenopathy, or thyromegaly  Chest: Bilateral equal air entry crackles on inspiration bilaterally, more on the posterior bases,  Heart: Normal S1, S2.  2/6 systolic murmur heard in the tricuspid and pulmonary areas, rubs, clicks or gallops. Normal rate, regular rhythm  Abdomen: Soft, nontender, distended, no masses or organomegaly, Bowel sounds heard, body wall edema +2 in anterior and posterior abdominal wall.  Neurological: Alert, oriented, normal speech, no acute focal findings  Skin and Extremities: Peripheral pulses normal, no pedal edema-however, edema and anasarca noted on upper thighs, no clubbing or cyanosis. No rashes, no suspicious skin lesions noted

## 2019-10-28 NOTE — PROGRESS NOTES
"  Nephrology Progress Note  10/28/2019         Assessment & Recommendations:   Kecia Blue is a 58 year old woman with CKD (biopsy proven CNI 8/2019) in setting of lung transplant (3/2018) for interstital lung disease on a background of dermatomyositis, anti-synthase syndrome, pulmonary hypertension; recently diagnosed with aspergillus empyema treated with voriconzole who now has anuric RADHA; on tacrolimius with peak level 31.6 on 10/21/2019.     1. RADHA on CKD. Tacrolimus levels lower. Unclear extent of possible recovery, remains oliguric. Symptomatically much improved with intermittent hemodialysis. Planning for outpatient hemodialysis.      2. Hypervolemia - improving with regular hemodialysis.     3. Na, K, TCO2 okay, no changes recommended.     4. Anemia with iron saturation low at 7%; ideally would like to use IV iron but will hold just now given recent Aspergillus empyema; continue oral iron.    Ra Basilio MD   [phone]    Interval History :   Nursing and provider notes from last 24 hours reviewed.  In the last 24 hours Kecia Blue has had no major intercurrent clinical events  Review of Systems:   I reviewed the following systems:  GI: good appetite. No nausea or vomiting or diarrhea.   Neuro:  no confusion  Constitutional:  no fever or chills  CV: no dyspnea or edema.  No chest pain.    Physical Exam:   I/O last 3 completed shifts:  In: -   Out: 250 [Urine:250]   BP (!) 141/96   Pulse 81   Temp 98.2  F (36.8  C) (Oral)   Resp 16   Ht 1.626 m (5' 4\")   Wt 57.4 kg (126 lb 8.7 oz)   LMP 06/07/2014 (Exact Date)   SpO2 99%   BMI 21.72 kg/m       GENERAL APPEARANCE: Nodistressed  EYES: no scleral icterus, pupils equal  HENT: mouth without ulcers or lesions  PULM: lungs clear to auscultation bilaterally, equal air movement, no clubbing  CV: regular rhythm, normal rate, no rub     -JVD absent     -edema+ feet and thighs   GI: soft, non-tender  INTEGUMENT: no cyanosis, no rash  NEURO:  " no asterixis       Labs:   All labs reviewed by me  Electrolytes/Renal -   Recent Labs   Lab Test 10/28/19  0553 10/27/19  0621 10/26/19  0626  10/23/19  0322 10/22/19  1314 10/21/19  1752    135 140   < > 135 133 133   POTASSIUM 4.2 4.0 4.2   < > 4.8 4.5 5.0   CHLORIDE 105 107 111*   < > 109 109 109   CO2 20 20 19*   < > 11* 13* 10*   BUN 38* 31* 49*   < > 70* 66* 66*   CR 3.94* 3.56* 4.80*   < > 6.62* 6.18* 5.76*   * 116* 127*   < > 139* 127* 138*   CHELSEY 7.9* 7.9* 8.1*   < > 8.0* 8.2* 8.0*   MAG  --   --   --   --  2.0 2.1 2.0   PHOS 4.6* 4.2  --   --  7.4* 7.3* 6.7*    < > = values in this interval not displayed.       CBC -   Recent Labs   Lab Test 10/28/19  0553 10/26/19  0626 10/24/19  2032   WBC 5.2 6.8 9.1   HGB 8.7* 10.1* 11.3*   PLT 63* 104* 160       LFTs -   Recent Labs   Lab Test 10/24/19  2032 10/23/19  0322 10/22/19  1314   ALKPHOS 227* 174* 181*   BILITOTAL 0.2 0.3 0.4   ALT 13 13 18   AST 21 12 19   PROTTOTAL 6.9 6.3* 6.7*   ALBUMIN 2.6* 2.6* 2.7*       Iron Panel -   Recent Labs   Lab Test 12/13/18  1033 08/01/18  0921  05/08/18  0709   IRON 16* 93  --  48   IRONSAT 7* 37  --  18   YOLA 302* 571*   < >  --     < > = values in this interval not displayed.          Current Medications:    calcium carbonate 600 mg-vitamin D 400 units  1 tablet Oral Daily     carvedilol  25 mg Oral BID w/meals     ferrous sulfate  325 mg Oral Daily with breakfast     heparin Lock (1000 units/mL High concentration)  3 mL Intracatheter Once     heparin Lock (1000 units/mL High concentration)  3 mL Intracatheter Once     melatonin  3 mg Oral At Bedtime     mirtazapine  15 mg Oral At Bedtime     multivitamin w/minerals  1 tablet Oral Daily     pantoprazole  40 mg Oral BID     predniSONE  5 mg Oral QAM    And     predniSONE  2.5 mg Oral QPM     sodium chloride (PF)  3 mL Intracatheter Q8H     tacrolimus  0.5 mg Oral QPM     tacrolimus  1 mg Oral QAM     voriconazole  200 mg Oral Q12H       Ra Basilio,  MD

## 2019-10-28 NOTE — PROGRESS NOTES
HEMODIALYSIS TREATMENT NOTE    Date: 10/28/2019  Time: 4:46 PM    Data:  Pre Wt: 59.4 kg (standing)  Desired Wt: 57.4 kg   Post Wt: 57.4 kg  Weight change: -2 kg  Ultrafiltration - Post Run Net Total Removed (mL): 2000 ml  Vascular Access Status: tunneled RIJ CVC.  Dressing change performed; exit site WDL.  Lumens saline locked post-HD and new Clear Guard caps applied. Lines reversed this run due to issues with arterial pressures.   Dialyzer Rinse: light, streaked  Total Blood Volume Processed: 61.2 L  Total Dialysis (Treatment) Time: 3 hours    Lab:   No    Assessment:  Patient RADHA with underlying CKD; recently initiated on HD 10/25/19.  Alert, oriented, able to make needs known, up independently.   Oliguric.  Hypertensive.    Interventions:  Patient dialyzed for 3 hours via tunneled RIJ CVC with dialysate K3/Ca3/Na138/[id number] and net UF 2 L.  Hypertension overall unchanged from start of treatment; nephrology made aware during rounds.  Patient tolerated HD well and writer provided patient with opportunities to ask questions regarding dialysis procedure; offered education today regarding bathing with CVC in place.      Plan:    Awaiting OP HD unit placement.  Next HD per renal team.  Handoff report given to LOUISE Rdz.

## 2019-10-28 NOTE — PROGRESS NOTES
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  She was admitted 10/21 with abdominal bloating and increased shortness of breath, s/p paracentesis (10/22) for 2L fluid removal with rapid reaccumulation and need for repeat paracentesis (10/24). Now with symptom improvement since starting iHD on 10/25.     Today's recommendations:  - Tacrolimus level today therapeutic, repeat tomorrow  - G6PD (10/27) pending, plan to start Dapsone if G6PD is normal  - CMV level [id number], next due   - EBV pending, expect result today  - Will need repeat chest CT for interval f/u of aspergillus empyema, defer until euvolemic  - Agree with right heart cath, but would defer until patient is euvolemic, possibly as OP      S/p bilateral sequential lung transplant (BSLT): Increased dyspnea on admission, likely due to pressure on her diaphragm from the recurrent ascites and also possibly from metabolic acidosis from renal dysfunction. No hypoxia, nonproductive congested cough.  CT scan (10/21) with small bilateral pleural effusions).  CXR (10/23) with increase in bilateral pleural effusions R>L as compared with 10/6.  - Volume management and dialysis schedule per primary and nephrology teams  - OP pulmonary f/u scheduled      Immunosuppression: On 2-drug IST for recurrent CMV and leukopenia. Of note, dose held on admission for significantly supratherapeutic level, resumed 10/24.  S/p basiliximab 10/24.  -  Tacrolimus 1 mg qAM/ 0.5 mg qPM. Goal 8-10. Level today 8.3 (trend level), repeat tomorrow (ordered).  - Prednisone 5 mg qAM / 2.5 mg qPM     Prophylaxis: Bactrim held since admission d/t RADHA  - Plan to start Dapsone if G6PD is normal, pending (10/27)     CMV reactivation: CMV D+/R+. First positive level 7/9/18, bronch CMV as high as 3.1 million (8/2/18).  Valgancyclovir therapy stopped 7/25.  CMV level has been transiently elevated, but generally has remained low and <137 since 9/24.  - CMV every 2 weeks per protocol and per ID, next due 11/7     Aspergillus empyema: Aspergillus noted in pleural space 10/8.  Has been on voriconazole course since 10/10, LFTs generally stable save for low level alk phos.  F/u EKG after 1 week with QTc 469.  Voriconazole level therapeutic on 10/24.  - Continue voriconazole 200 mg BID, minimum 3 months  - Will need repeat chest CT for interval f/u of aspergillus empyema, defer until euvolemic  - OP ID f/u scheduled 10/30     Other problems managed by the primary team:     Ascites: Abdominal CT (10/21) with moderate ascites/anasarca significantly increased from 9/12 and 10/1, gallbladder wall thickening with unchanged cholelithiasis soft tissue density in the medial left costophrenic angle, mild hepatosplenomegaly, and with mild wall thickening in the cecum, ascending colon and splenic flexure.  Abdominal US (10/22) with coarse hepatic echotexture, moderate ascites patent hepatic and portal vessels circumferential gallbladder wall thickening possible sludge versus gallbladder polyp.  Alkaline phosphatase is mildly elevated, remaining liver panel normal.  Ascites is not likely entirely d/t renal failure (see below), particularly since the CT reading suggests there may have been some ascites on previous September and October imaging.  S/p paracentesis 10/22 and repeated 10/24 d/t rapid reaccumulation. Cultures with NGTD. Leukemia/lymphoma evaluation without e/o B-cell non-Hodgkin  lymphoma, noted Polytypic B cells and Hodgkin lymphoma cannot be excluded by flow cytometry, however further w/u deferred as she does not have correlating clinical features. Hepatology consulted 10/24 (see their note), suggest chronic liver disease is likely 2/2 chronically increased right-sided cardiac pressures with resultant passive congestive hepatopathy and hepatic fibrosis.  Some evidence of renal recovery, as creatinine and urine output are improving steadily.  - EBV level (10/24) pending  - Agree with right heart cath, but would defer until patient is euvolemic, possibly as OP  - Management per primary team     Acute on chronic kidney disease: The patient had stage III CKD but appears to have RADHA on admission, possibly d/t supratherapeutic tacrolimus level on admission (31.6, true trough).  It has probably been gradually increasing since the addition of voriconazole at her last visit.  Prior tacrolimus level was 5.3 on 10/10 and she was already developing ascites by 10/13, which seems a little too fast for ascites due to renal failure due to tacrolimus.  Although this is the most likely cause, a broader differential should be maintained. S/p tunneled line 10/25.  Volume removal on first run limited by afib with RVR (10/25), iHD run the following day without recurrance.   - Management per nephrology and primary teams  - Discharge to home pending OP HD placement     We appreciate the excellent care provided by the [place] [id number] team. Recommendations communicated via in person rounding and this note. Will continue to follow along closely, please do not hesitate to call with any questions or concerns.     Patient discussed with Dr. Riley.    Hina Huff, DNP, APRN, CNP  Inpatient Nurse Practitioner  Pulmonary CF/Transplant  Pager [phone]     Subjective & Interval History:     Remains on RA, DESHPANDE continues to gradually improve with fluid removal.  Last iHD run on 10/26 with 1.5L removal, tolerated without  "issue.  Abdominal and peripheral edema improving.  Eating well, no nausea, loose stools stable.    Review of Systems:     C: no fever, no chills, no change in weight, no change in appetite  INTEGUMENTARY/SKIN: no rash or obvious new lesions  ENT/MOUTH: no sore throat, no sinus pain, no nasal congestion or drainage  RESP: see interval history  CV: no chest pain, no palpitations, + generalized edema, no orthopnea  GI: no nausea, no vomiting, no change in stools, no reflux symptoms  : + oliguria  MUSCULOSKELETAL: no myalgias, no arthralgias  ENDOCRINE: blood sugars with adequate control  NEURO: no headache, no numbness or tingling  PSYCHIATRIC: mood stable    Physical Exam:     Vital signs:  Temp: 96.8  F (36  C) Temp src: Axillary BP: (!) 142/91 Pulse: 79 Heart Rate: 89 Resp: 18 SpO2: 92 % O2 Device: None (Room air) Oxygen Delivery: 1/2 LPM Height: 162.6 cm (5' 4\") Weight: 59.6 kg (131 lb 8 oz)  I/O:     Intake/Output Summary (Last 24 hours) at 10/28/2019 1141  Last data filed at 10/28/2019 0800  Gross per 24 hour   Intake --   Output 250 ml   Net -250 ml     Constitutional: Sitting up in bed, in no apparent distress.   HEENT: Eyes with pink conjunctivae, anicteric. Oral mucosa moist without lesions.   PULM: Diminished bases bilaterally, R>L. Crackles to left lower and mid lung fields, no rhonchi, no wheezes. Non-labored breathing on RA.  CV: Normal S1 and S2. RRR. No murmur, gallop, or rub. Trace peripheral edema, trunk edema.   ABD: NABS, soft, nontender, mildly distended.    MSK: Moves all extremities. No apparent muscle wasting.   NEURO: Alert and oriented, conversant.   SKIN: Warm, dry. No rash on limited exam.   PSYCH: Mood stable.    Lines, Drains, and Devices:  Peripheral IV 10/21/19 Left Lower forearm (Active)   Site Assessment WDL 10/28/2019  9:00 AM   Line Status Saline locked 10/28/2019  9:00 AM   Phlebitis Scale 0-->no symptoms 10/28/2019  9:00 AM   Infiltration Scale 0 10/28/2019  9:00 AM "   Infiltration Site Treatment Method  None 10/28/2019  9:00 AM   Extravasation? No 10/28/2019  9:00 AM   Number of days: 7       CVC Double Lumen 10/25/19 Right Internal jugular (Active)   Site Assessment WDL 10/27/2019  8:15 PM   Dressing Intervention Chlorhexidine sponge;Transparent 10/21/2019 12:00 AM   Dressing Change Due 11/01/19 10/25/2019  5:10 PM   CVC Comment CDI  10/26/2019 10:26 AM   Lumen A - Color BLUE 10/26/2019 10:26 AM   Lumen A - Status saline locked;cap changed 10/26/2019 10:26 AM   Lumen A - Cap Change Due 11/01/19 10/25/2019  7:00 PM   Lumen B - Color RED 10/26/2019 10:26 AM   Lumen B - Status saline locked;cap changed 10/26/2019 10:26 AM   Lumen B - Cap Change Due 11/01/19 10/25/2019  7:00 PM   Extravasation? No 10/26/2019 10:26 AM   Number of days: 3     Data:     LABS    CMP:   Recent Labs   Lab 10/28/19  0553 10/27/19  0621 10/26/19  0626 10/25/19  0733 10/24/19  2032  10/23/19  0322 10/22/19  1314 10/21/19  1752  10/21/19  1451    135 140 137 136   < > 135 133 133  --  132*   POTASSIUM 4.2 4.0 4.2 4.8 4.6   < > 4.8 4.5 5.0  --  4.5   CHLORIDE 105 107 111* 112* 110*   < > 109 109 109  --  107   CO2 20 20 19* 14* 14*   < > 11* 13* 10*  --  14*   ANIONGAP 9 8 10 11 12   < > 15* 12 13  --  12   * 116* 127* 131* 118*   < > 139* 127* 138*  --  111*   BUN 38* 31* 49* 66* 69*   < > 70* 66* 66*  --  67*   CR 3.94* 3.56* 4.80* 6.16* 6.37*   < > 6.62* 6.18* 5.76*  --  5.90*   GFRESTIMATED 12* 13* 9* 7* 7*   < > 6* 7* 8*  --  7*   GFRESTBLACK 14* 16* 11* 8* 8*   < > 7* 8* 9*  --  8*   CHELSEY 7.9* 7.9* 8.1* 8.3* 8.2*   < > 8.0* 8.2* 8.0*  --  8.5   MAG  --   --   --   --   --   --  2.0 2.1 2.0  --   --    PHOS 4.6* 4.2  --   --   --   --  7.4* 7.3* 6.7*   < >  --    PROTTOTAL  --   --   --   --  6.9  --  6.3* 6.7*  --   --  7.2   ALBUMIN  --   --   --   --  2.6*  --  2.6* 2.7*  --   --  3.0*   BILITOTAL  --   --   --   --  0.2  --  0.3 0.4  --   --  0.3   ALKPHOS  --   --   --   --  227*  --   174* 181*  --   --  194*   AST  --   --   --   --  21  --  12 19  --   --  18   ALT  --   --   --   --  13  --  13 18  --   --  16    < > = values in this interval not displayed.     CBC:   Recent Labs   Lab 10/26/19  0626 10/24/19  2032 10/23/19  0322 10/22/19  1314   WBC 6.8 9.1 5.9 5.3   RBC 3.61* 3.98 3.27* 3.18*   HGB 10.1* 11.3* 9.1* 9.0*   HCT 34.1* 37.3 30.4* 29.7*   MCV 95 94 93 93   MCH 28.0 28.4 27.8 28.3   MCHC 29.6* 30.3* 29.9* 30.3*   RDW 19.9* 19.9* 19.6* 19.5*   * 160 140* 142*       INR:   Recent Labs   Lab 10/22/19  1314   INR 1.12       Glucose:   Recent Labs   Lab 10/28/19  0553 10/27/19  0621 10/26/19  0626 10/25/19  0733 10/24/19  2032 10/24/19  0557   * 116* 127* 131* 118* 89       Blood Gas:   Recent Labs   Lab 10/21/19  1459   PHV 7.25*   PCO2V 29*   PO2V 35   HCO3V 13*       Culture Data   Recent Labs   Lab 10/22/19  1430   CULT No growth       Virology Data:   Lab Results   Component Value Date    FLUAH1 Negative 10/07/2019    FLUAH3 Negative 10/07/2019    [id number] Negative 10/07/2019    IFLUB Negative 10/07/2019    RSVA Negative 10/07/2019    RSVB Negative 10/07/2019    PIV1 Negative 10/07/2019    PIV2 Negative 10/07/2019    PIV3 Negative 10/07/2019    HMPV Negative 10/07/2019    HRVS Negative 10/07/2019    ADVBE Negative 10/07/2019    ADVC Negative 10/07/2019    ADVC Negative 03/07/2019    ADVC Negative 01/17/2019       Historical CMV results (last 3 of prior testing):  Lab Results   Component Value Date    CMVQNT CMV DNA Not Detected 10/24/2019    CMVQNT CMV DNA Not Detected 10/06/2019    CMVQNT <137 (A) 09/12/2019     Lab Results   Component Value Date    CMVLOG Not Calculated 10/24/2019    CMVLOG Not Calculated 10/06/2019    CMVLOG <2.1 09/12/2019       Urine Studies    Recent Labs   Lab Test 10/21/19  2240 09/12/19  0125   URINEPH 5.0 7.5*   NITRITE Negative Negative   LEUKEST Large* Negative   WBCU 115* 3       Most Recent [place] Pulmonary Function Testing (FVC/FEV1  only)  FVC-Pre   Date Value Ref Range Status   08/07/2019 2.22 L    06/05/2019 2.26 L    03/05/2019 1.97 L    01/16/2019 1.92 L      FVC-%Pred-Pre   Date Value Ref Range Status   08/07/2019 67 %    06/05/2019 70 %    03/05/2019 60 %    01/16/2019 59 %      FEV1-Pre   Date Value Ref Range Status   08/07/2019 1.60 L    06/05/2019 1.65 L    03/05/2019 1.31 L    01/16/2019 1.04 L      FEV1-%Pred-Pre   Date Value Ref Range Status   08/07/2019 61 %    06/05/2019 64 %    03/05/2019 51 %    01/16/2019 40 %        IMAGING    No results found for this or any previous visit (from the past 48 hour(s)).

## 2019-10-28 NOTE — DISCHARGE INSTRUCTIONS
Outpatient dialysis has been set up at [facility] in [place] ([address]). Your schedule is Monday, Wednesday, Friday at 1:00 PM (please arrive at 12:30).

## 2019-10-29 ENCOUNTER — PATIENT OUTREACH (OUTPATIENT)
Dept: CARE COORDINATION | Facility: CLINIC | Age: 59
End: 2019-10-29

## 2019-10-29 ENCOUNTER — APPOINTMENT (OUTPATIENT)
Dept: CT IMAGING | Facility: CLINIC | Age: 59
DRG: 682 | End: 2019-10-29
Attending: INTERNAL MEDICINE
Payer: COMMERCIAL

## 2019-10-29 VITALS
RESPIRATION RATE: 16 BRPM | HEIGHT: 64 IN | TEMPERATURE: 97.4 F | HEART RATE: 81 BPM | WEIGHT: 127.3 LBS | BODY MASS INDEX: 21.73 KG/M2 | OXYGEN SATURATION: 97 % | DIASTOLIC BLOOD PRESSURE: 82 MMHG | SYSTOLIC BLOOD PRESSURE: 132 MMHG

## 2019-10-29 LAB
ANION GAP SERPL CALCULATED.3IONS-SCNC: 7 MMOL/L (ref 3–14)
BASOPHILS # BLD AUTO: 0 10E9/L (ref 0–0.2)
BASOPHILS NFR BLD AUTO: 0.4 %
BUN SERPL-MCNC: 21 MG/DL (ref 7–30)
CALCIUM SERPL-MCNC: 8 MG/DL (ref 8.5–10.1)
CHLORIDE SERPL-SCNC: 101 MMOL/L (ref 94–109)
CO2 SERPL-SCNC: 24 MMOL/L (ref 20–32)
COPATH REPORT: NORMAL
CREAT SERPL-MCNC: 2.55 MG/DL (ref 0.52–1.04)
DIFFERENTIAL METHOD BLD: ABNORMAL
EOSINOPHIL # BLD AUTO: 0.1 10E9/L (ref 0–0.7)
EOSINOPHIL NFR BLD AUTO: 2.6 %
ERYTHROCYTE [DISTWIDTH] IN BLOOD BY AUTOMATED COUNT: 19.4 % (ref 10–15)
G6PD RBC-CCNC: 19 U/G HB (ref 9.9–16.6)
GFR SERPL CREATININE-BSD FRML MDRD: 20 ML/MIN/{1.73_M2}
GLUCOSE SERPL-MCNC: 109 MG/DL (ref 70–99)
HCT VFR BLD AUTO: 28 % (ref 35–47)
HGB BLD-MCNC: 8.4 G/DL (ref 11.7–15.7)
IMM GRANULOCYTES # BLD: 0 10E9/L (ref 0–0.4)
IMM GRANULOCYTES NFR BLD: 0.6 %
INTERPRETATION ECG - MUSE: NORMAL
INTERPRETATION ECG - MUSE: NORMAL
LYMPHOCYTES # BLD AUTO: 0.5 10E9/L (ref 0.8–5.3)
LYMPHOCYTES NFR BLD AUTO: 9.6 %
MCH RBC QN AUTO: 28.2 PG (ref 26.5–33)
MCHC RBC AUTO-ENTMCNC: 30 G/DL (ref 31.5–36.5)
MCV RBC AUTO: 94 FL (ref 78–100)
MONOCYTES # BLD AUTO: 0.6 10E9/L (ref 0–1.3)
MONOCYTES NFR BLD AUTO: 10.3 %
NEUTROPHILS # BLD AUTO: 4.1 10E9/L (ref 1.6–8.3)
NEUTROPHILS NFR BLD AUTO: 76.5 %
NRBC # BLD AUTO: 0 10*3/UL
NRBC BLD AUTO-RTO: 0 /100
PF4 HEPARIN CMPLX AB SER QL: NEGATIVE
PHOSPHATE SERPL-MCNC: 3.4 MG/DL (ref 2.5–4.5)
PLATELET # BLD AUTO: 61 10E9/L (ref 150–450)
POTASSIUM SERPL-SCNC: 3.9 MMOL/L (ref 3.4–5.3)
RBC # BLD AUTO: 2.98 10E12/L (ref 3.8–5.2)
RETICS # AUTO: 67.9 10E9/L (ref 25–95)
RETICS/RBC NFR AUTO: 2.3 % (ref 0.5–2)
SODIUM SERPL-SCNC: 131 MMOL/L (ref 133–144)
TACROLIMUS BLD-MCNC: 7.8 UG/L (ref 5–15)
TME LAST DOSE: NORMAL H
WBC # BLD AUTO: 5.3 10E9/L (ref 4–11)

## 2019-10-29 PROCEDURE — 99239 HOSP IP/OBS DSCHRG MGMT >30: CPT | Performed by: INTERNAL MEDICINE

## 2019-10-29 PROCEDURE — 80048 BASIC METABOLIC PNL TOTAL CA: CPT | Performed by: HOSPITALIST

## 2019-10-29 PROCEDURE — 25000131 ZZH RX MED GY IP 250 OP 636 PS 637: Performed by: PHYSICIAN ASSISTANT

## 2019-10-29 PROCEDURE — 86022 PLATELET ANTIBODIES: CPT | Performed by: HOSPITALIST

## 2019-10-29 PROCEDURE — 85027 COMPLETE CBC AUTOMATED: CPT | Performed by: HOSPITALIST

## 2019-10-29 PROCEDURE — 25000131 ZZH RX MED GY IP 250 OP 636 PS 637: Performed by: NURSE PRACTITIONER

## 2019-10-29 PROCEDURE — 36415 COLL VENOUS BLD VENIPUNCTURE: CPT | Performed by: HOSPITALIST

## 2019-10-29 PROCEDURE — 40000611 ZZHCL STATISTIC MORPHOLOGY W/INTERP HEMEPATH TC 85060: Performed by: HOSPITALIST

## 2019-10-29 PROCEDURE — 71250 CT THORAX DX C-: CPT

## 2019-10-29 PROCEDURE — 85045 AUTOMATED RETICULOCYTE COUNT: CPT | Performed by: HOSPITALIST

## 2019-10-29 PROCEDURE — 25000132 ZZH RX MED GY IP 250 OP 250 PS 637: Performed by: HOSPITALIST

## 2019-10-29 PROCEDURE — 85025 COMPLETE CBC W/AUTO DIFF WBC: CPT | Performed by: HOSPITALIST

## 2019-10-29 PROCEDURE — 84100 ASSAY OF PHOSPHORUS: CPT | Performed by: HOSPITALIST

## 2019-10-29 PROCEDURE — 25000132 ZZH RX MED GY IP 250 OP 250 PS 637: Performed by: PHYSICIAN ASSISTANT

## 2019-10-29 PROCEDURE — 80197 ASSAY OF TACROLIMUS: CPT | Performed by: NURSE PRACTITIONER

## 2019-10-29 RX ORDER — TACROLIMUS 0.5 MG/1
CAPSULE ORAL
Qty: 90 CAPSULE | Refills: 3 | Status: SHIPPED | OUTPATIENT
Start: 2019-10-29 | End: 2019-11-07

## 2019-10-29 RX ORDER — DAPSONE 100 MG/1
100 TABLET ORAL DAILY
Qty: 30 TABLET | Refills: 3 | Status: SHIPPED | OUTPATIENT
Start: 2019-10-29 | End: 2019-12-03

## 2019-10-29 RX ORDER — VORICONAZOLE 50 MG/1
250 TABLET, FILM COATED ORAL EVERY 12 HOURS
Qty: 300 TABLET | Refills: 0 | Status: SHIPPED | OUTPATIENT
Start: 2019-10-29 | End: 2019-11-11

## 2019-10-29 RX ADMIN — VORICONAZOLE 200 MG: 200 TABLET, FILM COATED ORAL at 08:41

## 2019-10-29 RX ADMIN — CARVEDILOL 25 MG: 25 TABLET, FILM COATED ORAL at 08:41

## 2019-10-29 RX ADMIN — TACROLIMUS 1 MG: 1 CAPSULE ORAL at 08:42

## 2019-10-29 RX ADMIN — FERROUS SULFATE TAB 325 MG (65 MG ELEMENTAL FE) 325 MG: 325 (65 FE) TAB at 08:41

## 2019-10-29 RX ADMIN — PANTOPRAZOLE SODIUM 40 MG: 40 TABLET, DELAYED RELEASE ORAL at 08:42

## 2019-10-29 RX ADMIN — MULTIPLE VITAMINS W/ MINERALS TAB 1 TABLET: TAB at 08:42

## 2019-10-29 RX ADMIN — Medication 1 TABLET: at 08:42

## 2019-10-29 RX ADMIN — PREDNISONE 5 MG: 5 TABLET ORAL at 08:42

## 2019-10-29 ASSESSMENT — ACTIVITIES OF DAILY LIVING (ADL)
ADLS_ACUITY_SCORE: 11

## 2019-10-29 ASSESSMENT — MIFFLIN-ST. JEOR: SCORE: 1152.43

## 2019-10-29 NOTE — DISCHARGE SUMMARY
Dialysis Discharge Summary Brief    [facility]  Division of Nephrology  Nephrology Discharge Dialysis Orders  Ph: [phone]  Fax: [phone]    Kecia Blue  MRN: [id number]  YOB: 1960    Dialysis Unit: [facility]      Date of Admission: 10/21/2019  Date of Discharge: 10/21/2019  Discharge Diagnosis: Kecia Blue is a 58 year old woman with CKD (biopsy proven CNI 8/2019) in setting of lung transplant (3/2018) for interstital lung disease on a background of dermatomyositis, anti-synthase syndrome, pulmonary hypertension; recently diagnosed with aspergillus empyema treated with voriconzole who now has anuric RADHA; on tacrolimius with peak level 31.6 on 10/21/2019. Unclear extent of possible recovery, remains oliguric.     Of note, platelets levels recently dropped into 60s and HIT test result not yet available, would avoid heparin for now.      ICD-10-CM    1. Aspergillus (H) B44.9 voriconazole (VFEND) 50 MG tablet     Voriconazole Level   2. Acute kidney injury (H) N17.9 UA with Microscopic     Sodium random urine     Creatinine random urine     Basic metabolic panel     CK total     CK total     Magnesium     Magnesium     Phosphorus     Phosphorus     Tacrolimus level     Fractional excretion of sodium     Beta hydroxybutyrate level     Beta hydroxybutyrate level     Cell count with differential fluid     Albumin fluid     Protein fluid     Gram stain     fluid culture - Aerobic Bacterial     Phosphorus     Magnesium     INR     CBC with platelets     Comprehensive metabolic panel     Tacrolimus level     CBC with platelets     Basic metabolic panel     Magnesium     Hepatic panel     Hepatic panel     Phosphorus     Phosphorus     Potassium     Voriconazole Level     Tacrolimus level     CMV DNA quantification     Basic metabolic panel     EBV DNA PCR Quantitative Whole Blood     CBC with platelets     Lipase     Comprehensive metabolic panel      Troponin I     Basic metabolic panel     Quantiferon TB Gold Plus     Hepatitis B Surface Antibody     Hepatitis B surface antigen     Basic metabolic panel     Tacrolimus level     CBC with platelets     Mitochondrial M2 Antibody IgG     Basic metabolic panel     Tacrolimus level     F Actin EIA with reflex     IgG     Phosphorus     Glucose 6 phosphate dehydrogenase     Basic metabolic panel     Tacrolimus level     Phosphorus     CBC with platelets differential     CBC with platelets differential     Basic metabolic panel     Phosphorus     Tacrolimus level     Heparin Induced Thrombocytopenia Screen     Reticulocyte Count     CBC with platelets differential     CANCELED: Magnesium     CANCELED: Phosphorus     CANCELED: CK total     CANCELED: Beta hydroxybutyrate level     CANCELED: Comprehensive metabolic panel     CANCELED: Magnesium     CANCELED: Phosphorus     CANCELED: CBC with platelets differential     CANCELED: CBC with platelets   3. S/P lung transplant (H) Z94.2 tacrolimus (GENERIC EQUIVALENT) 0.5 MG capsule     voriconazole (VFEND) 50 MG tablet     Tacrolimus level     Voriconazole Level   4. Empyema of lung (H) J86.9 voriconazole (VFEND) 50 MG tablet     Voriconazole Level       [x] New initiation  New Orders (if not applicable put NA):  Estimated Dry Weight Post dialysis weight was 57.4 kg yesterday   Dialysis Duration 3 hours   Dialysis Access Tunneled CVC   Other major changes to dialysis prescription (e.g. 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 The following labs/tests are recommended: Tacrolimus   level and Voriconazole level.  Follow up with Dr. Nancy Aguilera, of Infectious Disease, on 11/13/2019 for   hospital follow up.   Follow up with primary care provider, Rosy Vu, within 2-4   weeks, for hospital follow- up. The following labs/tests are recommended:   Consider Right Heart Catheterization once closer to euvolemia.      Appointments on [place] and/or [facility] (with [facility] or [facility]   provider or service). Call [phone] if you haven't heard regarding   these appointments within 7 days of discharge.                      Unresulted Labs Ordered in the Past 30 Days of this Admission      Date and Time Order Name Status Description     10/29/2019 0000 Blood Morphology Pathologist Review In process       10/29/2019 0000 Heparin Induced Thrombocytopenia Screen In process       10/11/2019 0938 Nocardia culture Preliminary       10/11/2019 0938 AFB Culture Non Blood Preliminary       10/8/2019 1450 AFB Culture Non Blood Preliminary         These results will be followed up by Pulmonary/Gold 10/Infectious Disease        Discharge Disposition      Discharged to home  Condition at discharge: Stable        Hospital Course      Kecia Blue is a 58 year old female with a history of bilateral lung transplant (3/2018) secondary to ILD complicated by CMV viremia, anti-synthase syndrome, dermatomyositis, pulmonary HTN, HTN, GERD, and anemia who is admitted on 10/21/19 for RADHA on CKD stage IV as well as for new onset ascites.  Baseline creatinine was 1.8 in Oct 2019 visit      # Oliguric RADHA on CKD Stage 4 requiring Hemodialysis  Has had an elevated Cr of 1.2-1.4 since 6/2018. Had seen Nephrology Dr. Gamble on 8/7/19 for elevated creatinine to 1.8. Renal biopsy 8/2019 with arterionephrosclerosis and features concerning for calcineurin toxicity (tacrolimus). Patient recently started on Voriconazole during last admission on 10/6 for empyema with cx + aspergillus. Patient presented with oliguria, elevated ceatinine despite 3 days of outpatient IV fluid boluses (10/18-20). CT scan on admission unremarkable for hydronephrosis or obstructing stone. Also with new ascites s/p therapeutic paracentesis on 10/23 and 10/24 with rapid reaccumulation. Etiology of RADHA likely medication induced secondary to tacrolimus in setting of voriconazole. Given volume challenge on admission with no significant improvement in kidney function making pre-renal etiology unlikely. Bactrim also a possibility but has been on it chronically for some time. No evidence of UTI. No evidence of stenosis on renal US. Nephrology consulted and patient was eventually started on hemodialysis. Tacrolimus and voriconazole doses adjusted by Pulmonology. Outpatient dialysis arranged via Care Coordinator.      # Paroxysmal Afib  Started sfter dialysis, had a run of A. fib with RVR on 10/25. Home Coreg 25 mg twice daily was resumed and has been in normal sinus rhythm since 10/26. Has a history of paroxysmal A. Fib. Not on any anticoagulation. Her IRIQC2Kher score is 2- for HTN and sex (female). The evidence for AC on paroxysmal Afib is not strong. No clear studies demonstrating benefit vs risk of bleeding. Other than her low platelets, she currently does not have any bleeding risk factors. Given her ESRD, she would only qualify for Coumadin at this time. No urgent indication for anticoagulation, discussed previously with patient and will not prescribe on discharge.      # New onset  ascites:   Reported 23lb weight gain in 3 days prior to admission with CT showing moderate ascites/anasarca.  No history of cirrhosis. CT w/ mild hepatosplenomegaly. Reported abdominal bloating, early satiety and decreased appetite. No abdominal pain or tenderness, fever or leukocytosis.  Abdominal ultrasound done on admission 10/23 shows ascites and possible evidence of liver disease.  No evidence of portal thrombus. Paracentesis performed on 10/23 and 10/24 with rapid reaccumulation. Hepatology consulted and thought ascites and liver disease likely secondary to portal hypertension and cardiac cirrhosis recommending right heart catheterization when closer to euvolemia. If this exam is essentially normal and does not demonstrate significantly elevated right-sided cardiac pressures, would then proceed with a transjugular liver biopsy with portal pressure measurements to determine etiology of cirrhosis. If elevated R -sided pressures, most likely cardiac cirrhosis. IgG, F-actin, AMA checked and negative, no signs of autoimmune disease. Cytology negative on ascitic fluid. No evidence of SBP.      # Hx of Bilateral Lung transplant for ILD:   Transplanted in 3/2018; course complicated bymain bronchial stenosis requiring repeated dilatation, + DSAs, CMV viremia s/p valganciclovir with undetectable viral loads off therapy. Not on PTA O2 use. On PTA prednisone 5mg qAM and 2.5mg qPM and tacrolimus 2.5mg qAM and 2.0mg qPM and Bactrim 400-80mg daily for prophylaxis. Tacrolimus dose adjusted by Pulmonology and discharged on 1 mg qAM and 0.5 mg qPM. Bactrim was discontinued on discharge with follow up scheduled in less than one week. G6PD checked and pending at time of discharge.      On discharge IS Regimen:  - Prednisone 5mg qAM and 2.5mg qPM   - Tacrolimus 1mg qAM and 0.5mg qPM     # Hx of left pleural aspergillus empyema and focal left 10th rib osteomyelitis:   Recent admission from 10/6-10/12 for LUQ pain with MRCP  (negative), and found to have a left empyema and focal left 10th rib osteomyelitis on CT. CT guided bx 10/8 with purulent fluid positive for Aspergillus fumigatus in cultures. ID was consulted during that admission and patient was started on Voriconazole. CT surgery was consulted and discussed significant risk of morbidity w/ surgery and recommended tx w/ antifungal and serial CT scan for f/u. Voriconazole increased to 250mg BID with repeat level on 11/5. Repeat CT scan performed (read pending) on 10/29 prior to discharge in anticipation of ID follow up. Discussed with Transplant ID prior to discharge and will reschedule outpatient appointment for 11/13 as patient would not be able to make outpatient dialysis and ID follow up.      # Hx of low level CMV viremia:   Peak viral load >3M in 8/2018, was on ganciclovir until 7/25/19. Currently biweekly monitoring of low level CMV viremia. Not detected 10/6/19. Repeat CMV level to be drawn 11/7     # HTN:   On PTA Amlodipine 10mg daily, Carvedilol 25mg BID, and Imdur 120mg daily. Pt reports BP had been increasing and labile over the past 6 months. Was started on Carvedilol, Imdur during previous hospitalization. Blood pressures labile but trended toward normal. Resumed carvedilol 25 mg twice daily with hold parameters given her episode of A. fib with RVR.  Currently in sinus rhythm.  Continue to hold amlodipine 10 mg and Imdur 120 mg and restart as needed as outpatient.      # Anemia  # Thrombocytopenia:   Hgb 9.4 on admission. BL appears 8-9. Plt 134 on admission. Newly thrombocytopenic of 61. Had been decreasing slowly since 10/6. Unclear etiology, potentially medication vs HIT vs bone marrow suppression. Peripheral smear ordered. HIT panel ordered. Discussed the need for citrate in catheter with Nephrology who will relay information to outpatient dialysis center until HIT panel returns.      # Gallbladder wall thickening with cholelithiasis  # Mild wall thickening  "of cecus, ascending colon, and splenic flexture:   CT w/ Gallbladder wall thickening w/ cholelithasis. Findings may represent reactive changes to ascites. Acute cholecystitis cannot be ruled out.  This has been evidence on previous imaging studies.  MRI abd 9/12 w/o choledocholithaisis or obstructing mass. No RUQ pain. No WBC or elevated total bili, ALT or AST. Do not have concern for cholecystitis at this time. CT also with mild wall thickening involving cecus, ascending colon, and splenic flexture could be reactive; vs mild colitis is also on ddx per Radiology. On exam, abdominal exam is benign.   - Monitor     # Dermatomyositis  # Seronegative RA:   No active treatments. Has had increasing bilateral knee pain which has been assessed with OP MRI. Follows with Rheumatology as OP.            Consultations This Hospital Stay      TRANSPLANT SURGERY LIVER ADULT IP CONSULT  PULMONARY CF/TRANSPLANT ADULT IP CONSULT  NEPHROLOGY GENERAL ADULT IP CONSULT  INTERNAL MEDICINE PROCEDURE TEAM ADULT IP CONSULT [facility]  INTERNAL MEDICINE PROCEDURE TEAM ADULT IP CONSULT [facility]  MEDICATION HISTORY IP PHARMACY CONSULT  INTERNAL MEDICINE PROCEDURE TEAM ADULT IP CONSULT [facility]  GI HEPATOLOGY ADULT IP CONSULT  INTERVENTIONAL RADIOLOGY ADULT/PEDS IP CONSULT  NUTRITION SERVICES ADULT IP CONSULT        Code Status      Full Code        Time Spent on this Encounter      I, Jackson Schwartz MD, personally saw the patient today and spent greater than 30 minutes discharging this patient.     Jackson Schwartz MD  [facility], [place]  ______________________________________________________________________        Physical Exam     BP (!) 143/87 (BP Location: Right arm)   Pulse 81   Temp 98.8  F (37.1  C) (Oral)   Resp 16   Ht 1.626 m (5' 4\")   Wt 57.7 kg (127 lb 4.8 oz)   LMP 06/07/2014 (Exact Date)   SpO2 96%   BMI 21.85 kg/m    General: AAOx3, well " appearing woman in NAD  Skin: no rashes or bruising  CV: RRR, normal S1S2, no murmur  Resp: CTAB, no wheeze, rhonchi   Abd: Soft, non-tender, distended, BS+, no masses appreciated  Extremities: warm and well perfused, trace edema  Neuro: No lateralizing symptoms or focal neurologic deficits          Primary Care Physician      Rosy Vu        Discharge Orders         Tacrolimus level      Voriconazole Level          Reason for your hospital stay     Dear Kecia Blue,     Your were hospitalized at [facility] with acute kidney injury and treated with dialysis.  Over your hospitalization your condition improved and today you are ready to be discharged home.  You should continue to improve but if you develop fever, shortness of breath, difficulty breathing, swelling, or confusion please seek medical attention.     We are suggesting the following medication changes:  Hold amlodipine and imdur until follow up with Nephrology.  Stop bactrim for prophylaxis until follow up with Pulmonary.  Decrease tacrolimus to 1mg in the AM and 0.5mg in the PM.  Increase voriconazole to 250 mg twice daily.     Please get the following tests done:  Tacrolimus level on 11/1 and 11/5  Voriconazole level on 11/5     Please set up an appointment with:  Pulmonary as previously scheduled on 11/5  Dr. Nancy Aguilera of Infectious Disease on 11/13     It was a pleasure meeting with you today. Thank you for allowing me and my team the privilege of caring for you over the course of your hospitalization. You are the reason we are here, and I truly hope we provided you with the excellent service you deserve. Please let us know if there is anything else we can do for you so that we can be sure you are leaving completely satisfied with your care experience.     Your hospital unit at the time of discharge is 5A so if you have any questions please call the hospital at [phone] and ask to talk to a nurse on  5A.      Sincerely,     Jackson Schwatrz  Internal Medicine Hospitalist  [facility]          Adult [facility] Follow-up and recommended labs and tests     Follow up with Pulmonary as previously scheduled on 11/5/2019 for hospital follow- up. The following labs/tests are recommended: Tacrolimus level and Voriconazole level.  Follow up with Dr. Nancy Aguilera, of Infectious Disease, on 11/13/2019 for hospital follow up.      Appointments on [place] and/or [facility] (with [facility] or [facility] provider or service). Call [phone] if you haven't heard regarding these appointments within 7 days of discharge.          Activity     Your activity upon discharge: activity as tolerated          When to contact your care team     Call your primary doctor if you have any of the following: temperature greater than 101F, increased shortness of breath, increased swelling or increased pain.          Adult [facility] Follow-up and recommended labs and tests     Follow up with primary care provider, Rosy Vu, within 2-4 weeks, for hospital follow- up. The following labs/tests are recommended: Consider Right Heart Catheterization once closer to euvolemia.      Appointments on [place] and/or [facility] (with [facility] or [facility] provider or service). Call [phone] if you haven't heard regarding these appointments within 7 days of discharge.      Full Code          Diet     Follow this diet upon discharge: Orders Placed This Encounter      Snacks/Supplements Adult: Boost Shake; Between Meals      2 Gram Sodium Diet            Significant Results and Procedures     Most Recent 3 BMP's:        Recent Labs   Lab Test 10/29/19  0617 10/28/19  0553 10/27/19  0621   * 134 135   POTASSIUM 3.9 4.2 4.0   CHLORIDE 101 105 107   CO2 24 20 20   BUN 21 38* 31*   CR 2.55* 3.94* 3.56*   ANIONGAP 7 9 8   CHELSEY 8.0* 7.9* 7.9*   * 127* 116*   ,        Results for orders placed or performed during the hospital  encounter of 10/21/19   CT Abdomen Pelvis w/o Contrast     Value     Radiologist flags Gallbladder     Narrative     EXAMINATION: CT abdomen and pelvis without contrast on 10/21/2019.     INDICATION: Abdominal bloating, acute kidney injury???evaluate for  hydronephrosis. History of rheumatoid arthritis, interstitial lung  disease, dermatomyositis, Raynaud's disease, pulmonary hypertension  and antisynthetase syndrome. Bilateral lung transplant 3/1/2018.  CT  guided left fluid collection aspiration positive for aspergillus     COMPARISON: MR dated 9/12/2019. CT dated 8/20/2018.  Outside CT  10/1/2019     TECHNIQUE: Routine images from the level of the diaphragm through the  pelvis were obtained without contrast.   Total DLP: 840 mGy*cm.     FINDINGS: Limited evaluation of the abdomen and pelvis due to  noncontrast examination.     Lines and tubes: None.     Lower thorax: Cardiomegaly. Trace pericardial effusion. Small  bilateral pleural effusions. Bibasilar fibroatelectasis.  Rounded soft  tissue density structure in the medial left costophrenic angle is not  substantially changed measuring about 3.8 x 2.5 cm (series 5, image  62) with unchanged mild cortical irregularity of the left 10th rib.      Liver: Hepatomegaly. No focal lesions seen on this noncontrast study.     Gallbladder and bile ducts: Equivocal gallbladder wall thickening is  nonspecific in the setting of ascites. Cholelithiasis.     Pancreas: Unremarkable noncontrast appearance.     Spleen: Measures 13.5 cm in the craniocaudal axis.      Kidneys and ureters: No hydronephrosis or stones.      Adrenal glands: No nodules.     Stomach and bowel: No definite small or large bowel dilation.  Mild  wall thickening involving the cecum and ascending colon and to a  lesser extent splenic flexure could be reactive in the setting of  ascites.  Visualized appendix is unremarkable.     Pelvic organs: Decompressed urinary bladder. Pelvic phleboliths.  Probable uterine  fibroid without significant change.     Peritoneum/retroperitoneum: Moderate ascites. No intra-abdominal free  air.     Vessels: No abdominal aortic aneurysm. Scattered atherosclerotic  calcifications of the aorta and branching vessels.     Lymph nodes: No definite abnormally enlarged lymph nodes are seen on  this noncontrast study.  Few prominent nonspecific retroperitoneal  lymph nodes.  Symmetric prominent inguinal lymph nodes.     Bones and soft tissues: No acute osseous abnormalities. Diffuse soft  tissue edema.  Sternotomy.        Impression     IMPRESSION:  1. Moderate ascites/anasarca significantly increased since 9/12/2019  and 10/1/2019.  2. Gallbladder wall thickening with cholelithiasis. Findings may  represent reactive changes to ascites. However acute cholecystitis  cannot be ruled out. Consider ultrasound for further evaluation as  clinically indicated.  3. Postoperative changes bilateral lung transplantation. Rounded soft  tissue density structure, presumably known empyema, in the medial left  costophrenic angle is not substantially changed with unchanged mild  cortical irregularity of the left 10th rib.  Attention on follow-up is  advised.  4. Mild hepatosplenomegaly.  5. Small bilateral pleural effusions.  6. No hydronephrosis or renal stones in either kidney.  7.  Mild wall thickening involving the cecum, ascending colon and  splenic flexure could be reactive.  Alternatively mild colitis is a  consideration in the appropriate clinical context.     [Consider Follow Up: Gallbladder]     This report will be copied to the [facility] to ensure a  provider acknowledges the finding.                     I have personally reviewed the examination and initial interpretation  and I agree with the findings.     KEYLA DACOSTA MD   US Renal Complete w Duplex Complete     Narrative     Exam: Duplex Doppler ultrasound of the kidneys and bilateral renal  arteries dated 10/22/2019 10:09  AM     Comparison Study: MRI abdomen 9/12/2019     Clinical Information: Evaluate for renal artery stenosis, history of  acute kidney injury with uncontrolled hypertension     Technique: B-mode (grayscale) and duplex Doppler evaluation of the  abdominal aorta and renal arteries performed. Velocity measurements  obtained with angle correction at or less than 60 degrees.        Findings:     The right kidney measures 8.4 cm in length. The left kidney measures  9.2 cm in length. Cortical thickness and echogenicity is normal. No  evidence of stones, masses or hydronephrosis.     Peak systolic velocities in the abdominal aorta are:     Supine and infrarenal aorta is not visualized.      Right renal artery velocities:     Origin: Not visualized  Mid renal artery: Not visualized   Hilum/distal renal artery: 30 cm/sec     Resistive indices in the arcuate arteries:      Not visualized     Patent right renal vein.     Left renal artery velocities:     Origin: Not visualized   Mid renal artery: 41 cm/sec  Hilum/distal renal artery: 48 cm/sec     Resistive indices in the arcuate arteries:      Inferior pole: Not visualized   Mid pole: 0.7  Superior pole: Not visualized      Patent left renal vein.        Impression     Impression:     1. Right kidney:     1a. Renal parenchyma: Atrophic with no hydronephrosis or shadowing  calculus  1b. Renal artery: Limited evaluation with nonvisualization of  intrarenal arcuate arteries and proximal right renal artery; normal  velocity and waveform at the right renal hilum     2. Left kidney:     2a. Renal parenchyma: No hydronephrosis or shadowing calculus  2b. Renal artery: Limited evaluation with nonvisualization of superior  and inferior arcuate arteries and proximal left renal artery; normal  velocity and waveforms in the mid renal artery, left renal hilum.     I have personally reviewed the examination and initial interpretation  and I agree with the findings.     JAMES LAI MD    US Abdomen Limited w Doppler Complete     Narrative     EXAMINATION: US ABDOMEN LIMITED WITH DOPPLER COMPLETE, 10/22/2019  10:09 AM      COMPARISON: MRI abdomen 9/12/2019; ultrasound abdomen 9/11/2019     HISTORY: Evaluate for portal hypertension or thrombus; new onset  ascites     TECHNIQUE: The abdomen was scanned in standard fashion with  specialized ultrasound transducer(s) using both grey scale, color  Doppler, and spectral flow techniques.     Findings:     Liver: The liver demonstrates coarse echotexture. No evidence of a  focal hepatic mass or intrahepatic biliary ductal dilatation.      Extrahepatic portal vein flow is antegrade, measuring 19 cm/sec.  Left portal vein flow is antegrade, measuring 13 cm/sec.  Right portal vein flow is antegrade, measuring 9 cm/sec.     Flow in the hepatic artery is towards the liver and:  38 cm/sec peak systolic  0.87 resistive index.   Left hepatic artery is not visualized.     Splenic vein is not visualized. The left, middle, and right hepatic  veins are patent with flow towards the IVC. The IVC is not  well-visualized.      Gallbladder: Diffuse thickening of the gallbladder wall measuring up  to 13 mm. Punctate echogenic intraluminal foci with no shadowing may  represent sludge ball versus polyp (image 24 & 27). No stones are  detected.      Bile Ducts: Both the intra- and extrahepatic biliary system are of  normal caliber.  The common bile duct measures 6 mm in diameter.     Pancreas: Pancreas obscured by bowel gas.     Kidneys: Atrophic right kidney measuring 8.4 cm, detailed description  in renal duplex ultrasound done on same date.     Fluid: Ascites.        Impression     Impression:  1. Coarse hepatic echotexture, which may be seen with intrinsic  parenchymal liver disease.  2. Moderate ascites.  3. Patent visualized hepatic and portal vessels.  4. Nonspecific circumferential gallbladder wall thickening which may  be seen with ascites and systemic/hepatic  disease.  5. Punctate nonmobile, non shadowing echogenic foci in the  gallbladder, may represent adherent sludge ball versus polyp.     I have personally reviewed the examination and initial interpretation  and I agree with the findings.     JAMES LAI MD   POC US Guide for Paracentesis     Narrative     Moderate ascites in RLQ, accessible for bedside paracentesis.   XR Chest 2 Views     Narrative     Exam: XR CHEST 2 VW, 10/23/2019 9:43 AM     Indication: lung transplant with dyspnea     Comparison: 10/6/2019     Findings:   Increasing right pleural effusion. There is associated atelectasis.  Linear atelectasis at the left lung base. Mild pulmonary venous  congestion. Heart within normal limits. Low lung volume.        Impression     Impression:   1. Low lung volumes versus mild pulmonary venous congestion.  2. Increasing right pleural effusion with associated atelectasis.  Consider pneumonia clinically.  3. Increasing left lower lobe atelectasis.     ANNE MANZANO MD   IR CVC Tunnel Placement > 5 Yrs of Age     Narrative     PROCEDURES:  1. Ultrasound guidance for venous access  2. Placement centrally inserted catheter (age > 5 yrs.) tunneled, no  port, no pump  3. Fluoroscopic guidance placement     Clinical History: Renal failure, requires access for dialysis.  Tunneled dialysis catheter placement requested.     Comparisons: 10/8/2019     Staff Radiologist: Kel Dawson MD     Fellow(s)/Resident(s): MD Morgan García MD     I, KEL DAWSON MD, attest that I was present for all critical portions  of the procedure and was immediately available to provide guidance and  assistance during the remainder of the procedure.     Medications: The patient was placed on continuous monitoring.  Intravenous sedation was administered. 2 mg Versed and 100 mcg  fentanyl IV. Vital signs and sedation monitored by nursing staff under  Interventional Radiologists supervision. The patient remained  stable  throughout the procedure.     Attending face-to-face sedation time: Less than 5 minutes.     Fluoroscopy time: 1.7 minutes.     PROCEDURE: The patient understood the limitations, alternatives, and  risks of the procedure and requested the procedure be performed. Both  written and oral consent were obtained.     Limited jugular ultrasound documented jugular vein patency.     The right neck and upper chest were prepped and draped in the usual  sterile fashion. 1% lidocaine without epinephrine was used for local  anesthesia.      Under ultrasound guidance, right internal jugular venotomy was made  with a micropuncture needle. Permanent copy of the image documenting  the vein was entered into the patients record.     Micropuncture needle exchanged over guidewire for the micropuncture  sheath under fluoroscopic guidance. Catheter length measured with the  0.018 guidewire. Micropuncture sheath saline locked. A 23 cm tip to  cuff 14.5 Palindrome dialysis catheter was subcutaneously tunneled  from the right anterior chest to the right internal jugular venotomy  site. Micropuncture sheath exchanged over guidewire for the peel-away  sheath. Guidewire removed. Under fluoroscopic guidance, the catheter  placed through a peel-away sheath and positioned with its tip in the  right atrium.      Fluoroscopic image documenting catheter placement a position was saved  in the patient's record.     Both lumens flushed and aspirated adequately. Each lumen heparin  locked with 1000:1 heparin solution. 2-0 nylon catheter retaining  suture and sterile dressing applied. Right internal jugular venotomy  site closed with Dermabond and 3-0 Vicryl deep dermal suture. No  immediate complication.        Impression     IMPRESSION:   1. Ultrasound guided right internal jugular venotomy.  2. Twenty-three cm tip to cuff 14.5 Amharic Vaxcel tunneled dialysis  catheter placed under fluoroscopic guidance with the tip in the right  atrium. Both  lumens heparin locked and ready for use.     I have personally reviewed the examination and initial interpretation  and I agree with the findings.     OBIE DAWSON MD   XR Abdomen Port 1 View     Narrative     Exam: XR ABDOMEN PORT 1 VW, 10/24/2019 8:17 PM     Indication: epigastric pain - obtain upright film     Comparison: None available     Findings:   Single upright frontal view of the abdomen. No abnormally dilated  loops of small bowel or colon. No free air in the abdomen. Median  sternotomy wires. Bibasilar atelectasis and pleural effusion.        Impression     Impression: No free air in the abdomen identified.     I have personally reviewed the examination and initial interpretation  and I agree with the findings.     ADAM GONZALEZ MD   Echocardiogram Complete     Narrative     [id number]  [id number]  [id number]  [id number]^BRANDEE^NIYAH^ADDIE           [facility],[place]  Echocardiography Laboratory  [address] [place] [id number]     Name: SARAI KILLIAN  MRN: [id number]  : 1960  Study Date: 10/23/2019 09:53 AM  Age: 58 yrs  Gender: Female  Patient Location: [facility]  Reason For Study: Heart Failure  Ordering Physician: NIYAH IRVIN  Performed By: MARTHA Montes     BSA: 1.7 m2  Height: 64 in  Weight: 148 lb  HR: 78  BP: 125/74 mmHg  _____________________________________________________________________________  __        Procedure  Echocardiogram with two-dimensional, color and spectral Doppler performed.  _____________________________________________________________________________  __        Interpretation Summary  Left ventricular function, chamber size, wall motion, and wall thickness are  normal.The EF is 60-65%.  Right ventricular function, chamber size, wall motion, and thickness are  normal.  Mild to moderate tricuspid insufficiency is present.  Mild pulmonary hypertension is present.  Pulmonary artery systolic pressure is 48 mmHg (33 mmHg+RA  pressure).  This study was compared with the study from 3/20/18: There has been no  significant change.  _____________________________________________________________________________  __        Left Ventricle  Left ventricular function, chamber size, wall motion, and wall thickness are  normal.The EF is 60-65%. Left ventricular diastolic function is indeterminate.     Right Ventricle  Right ventricular function, chamber size, wall motion, and thickness are  normal.     Atria  The right atria appears normal. Mild left atrial enlargement is present. The  atrial septum is intact as assessed by color Doppler .     Mitral Valve  The mitral valve is normal. Mild mitral insufficiency is present.        Aortic Valve  Aortic valve is normal in structure and function. The aortic valve is  tricuspid.     Tricuspid Valve  Mild to moderate tricuspid insufficiency is present. Mild pulmonary  hypertension is present. Pulmonary artery systolic pressure is 48 mmHg (33  mmHg+RA pressure).     Vessels  The aorta root is normal. The pulmonary artery cannot be assessed. Dilation of  the inferior vena cava is present with abnormal respiratory variation in  diameter. IVC diameter >2.1 cm collapsing <50% with sniff suggests a high RA  pressure estimated at 15 mmHg or greater.     Pericardium  No pericardial effusion is present.     Miscellaneous  A left pleural effusion is present. Ascites is noted.        Compared to Previous Study  This study was compared with the study from 3/20/18 . There has been no  significant change.  _____________________________________________________________________________  __  MMode/2D Measurements & Calculations     RVDd: 3.6 cm  IVSd: 1.1 cm  LVIDd: 4.1 cm  LVIDs: 2.9 cm  LVPWd: 0.87 cm  FS: 30.3 %  LV mass(C)d: 129.7 grams  LV mass(C)dI: 75.4 grams/m2  asc Aorta Diam: 3.0 cm  LVOT diam: 1.8 cm  LVOT area: 2.5 cm2  LA Volume Index (BP): 41.4 ml/m2  RWT: 0.42  TAPSE: 1.2 cm           Doppler Measurements &  Calculations  MV E max herberth: 83.8 cm/sec  MV A max herberth: 78.5 cm/sec  MV E/A: 1.1  MV dec slope: 386.0 cm/sec2  MV dec time: 0.22 sec  TV max P.5 mmHg  PA acc time: 0.12 sec  TR max herberth: 285.0 cm/sec  TR max P.5 mmHg  E/E' av.2  Lateral E/e': 7.4  Medial E/e': 11.1     _____________________________________________________________________________  __           Report approved by: Glen Medina 10/23/2019 01:20 PM         *Note: Due to a large number of results and/or encounters for the requested time period, some results have not been displayed. A complete set of results can be found in Results Review.            Discharge Medications           Current Discharge Medication List             CONTINUE these medications which have CHANGED     Details   tacrolimus (GENERIC EQUIVALENT) 0.5 MG capsule Take 2 capsules (1 mg) by mouth every morning AND 1 capsule (0.5 mg) every evening.  Qty: 90 capsule, Refills: 3     Associated Diagnoses: S/P lung transplant (H)       voriconazole (VFEND) 50 MG tablet Take 5 tablets (250 mg) by mouth every 12 hours  Qty: 300 tablet, Refills: 0     Associated Diagnoses: S/P lung transplant (H); Aspergillus (H); Empyema of lung (H)                 CONTINUE these medications which have NOT CHANGED     Details   acetaminophen (TYLENOL) 500 MG tablet Take 1,000 mg by mouth every 6 hours as needed for pain        calcium-vitamin D (CALTRATE) 600-400 MG-UNIT per tablet Take 1 tablet by mouth daily     Associated Diagnoses: S/P lung transplant (H); ILD (interstitial lung disease) (H); Lung transplant recipient (H); Encounter for long-term (current) use of high-risk medication; Anemia, unspecified type; Cytomegalovirus (CMV) viremia (H)       carvedilol (COREG) 25 MG tablet Take 1 tablet (25 mg) by mouth 2 times daily (with meals)  Qty: 60 tablet, Refills: 0     Associated Diagnoses: Essential hypertension       dronabinol (MARINOL) 5 MG capsule Take 5 mg by mouth 2 times daily  (before meals) as needed for nausea       ferrous sulfate (FEROSUL) 325 (65 Fe) MG tablet Take 1 tablet (325 mg) by mouth daily (with breakfast)  Qty: 60 tablet, Refills: 2     Associated Diagnoses: S/P lung transplant (H)       magnesium oxide (MAG-OX) 400 MG tablet Take 400 mg by mouth daily        mirtazapine (REMERON SOL-TAB) 15 MG ODT 1 tablet (15 mg) by Orally disintegrating tablet route At Bedtime  Qty: 30 tablet, Refills: 0     Associated Diagnoses: Severe protein-calorie malnutrition (H)       multivitamin, therapeutic with minerals (THERA-VIT-M) TABS tablet Take 1 tablet by mouth daily  Qty: 30 each, Refills: 11     Associated Diagnoses: Lung transplant recipient (H)       ondansetron (ZOFRAN) 4 MG tablet Take 1 tablet (4 mg) by mouth every 12 hours as needed for nausea  Qty: 60 tablet, Refills: 0     Associated Diagnoses: Intractable vomiting with nausea, unspecified vomiting type       pantoprazole (PROTONIX) 40 MG EC tablet Take 1 tablet (40 mg) by mouth 2 times daily  Qty: 60 tablet, Refills: 11     Associated Diagnoses: Gastroesophageal reflux disease without esophagitis; ILD (interstitial lung disease) (H); Lung transplant recipient (H)       predniSONE (DELTASONE) 2.5 MG tablet Take two tablets (5mg) every AM and one tablet (2.5mg) every evening  Qty: 90 tablet, Refills: 11     Associated Diagnoses: Lung replaced by transplant (H)       senna-docusate (SENOKOT-S/PERICOLACE) 8.6-50 MG tablet Take 2 tablets by mouth 2 times daily as needed for constipation  Qty: 60 tablet, Refills: 0     Associated Diagnoses: Constipation, unspecified constipation type                STOP taking these medications         amLODIPine (NORVASC) 10 MG tablet Comments:   Reason for Stopping:            isosorbide mononitrate (IMDUR) 120 MG 24 HR ER tablet Comments:   Reason for Stopping:            sulfamethoxazole-trimethoprim (BACTRIM/SEPTRA) 400-80 MG tablet Comments:   Reason for Stopping:            tacrolimus  (GENERIC EQUIVALENT) 1 MG capsule Comments:   Reason for Stopping:                    Allergies      No Known Allergies         Routing History                          Name of physician completing this form: Ra Basilio MD, MD

## 2019-10-29 NOTE — PROGRESS NOTES
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  She was admitted 10/21 with abdominal bloating and increased shortness of breath, s/p paracentesis (10/22) for 2L fluid removal with rapid reaccumulation and need for repeat paracentesis (10/24). Now with symptom improvement since starting iHD on 10/25.     Today's recommendations:  - OP pulmonary f/u scheduled   - Tacrolimus level today slightly subtherapeutic, voriconazole dose increased so no further adjustment indicated, repeat level  and   - Start Dapsone for PJP ppx (no G6PD deficiency)  - CMV every 2 weeks per protocol and per ID, next due   - Continue voriconazole 250 mg BID (dose increased on day of discharge for recent level of 1.5 and to aid in tacro management given small dosing), repeat voriconazole level , minimum 3 month course  - Chest CT completed 10/29 for interval f/u of aspergillus empyema,, final result pending  - OP ID f/u scheduled   - Repeat EBV level in one month (~)   - Right heart cath as OP when pt. is euvolemic  - Discharge with qMWF HD schedule near home      S/p bilateral sequential lung transplant (BSLT): Increased dyspnea on admission, likely due to pressure on her diaphragm from the recurrent ascites and also possibly from metabolic acidosis from renal dysfunction. No hypoxia, nonproductive congested cough.  CT scan (10/21) with small bilateral pleural  effusions).  CXR (10/23) with increase in bilateral pleural effusions R>L as compared with 10/6.  - OP pulmonary f/u scheduled 11/5     Immunosuppression: On 2-drug IST for recurrent CMV and leukopenia. Of note, dose held on admission for significantly supratherapeutic level, resumed 10/24.  S/p basiliximab 10/24.  - Tacrolimus 1 mg qAM/ 0.5 mg qPM. Goal 8-10. Level today 7.8 (11h), defer dose increase given increase in voriconazole (as below).  OP levels 11/1 and 11/5.  - Prednisone 5 mg qAM / 2.5 mg qPM     Prophylaxis: Bactrim held since admission d/t RADHA  - Start Dapsone for PJP ppx upon discharge (no G6PD deficiency)     CMV reactivation: CMV D+/R+. First positive level 7/9/18, bronch CMV as high as 3.1 million (8/2/18).  Valgancyclovir therapy stopped 7/25.  CMV level has been transiently elevated, but generally has remained low and <137 since 9/24.  - CMV every 2 weeks per protocol and per ID, next due 11/7     Aspergillus empyema: Aspergillus noted in pleural space 10/8.  Has been on voriconazole course since 10/10, LFTs generally stable save for low level alk phos.  F/u EKG after 1 week with QTc 469.  Voriconazole level therapeutic on 10/24.  - Continue voriconazole 250 mg BID (dose increased on day of discharge for recent level of 1.5 and to aid in tacro management given small dosing), repeat voriconazole level 11/5, minimum 3 month course  - Chest CT completed 10/29 for interval f/u of aspergillus empyema,, final result pending  - OP ID f/u scheduled 11/13     Other problems managed by the primary team:     Ascites: Abdominal CT (10/21) with moderate ascites/anasarca significantly increased from 9/12 and 10/1, gallbladder wall thickening with unchanged cholelithiasis soft tissue density in the medial left costophrenic angle, mild hepatosplenomegaly, and with mild wall thickening in the cecum, ascending colon and splenic flexure.  Abdominal US (10/22) with coarse hepatic echotexture, moderate ascites  patent hepatic and portal vessels circumferential gallbladder wall thickening possible sludge versus gallbladder polyp.  Alkaline phosphatase is mildly elevated, remaining liver panel normal.  Ascites is not likely entirely d/t renal failure (see below), particularly since the CT reading suggests there may have been some ascites on previous September and October imaging.  S/p paracentesis 10/22 and repeated 10/24 d/t rapid reaccumulation. Cultures with NGTD. Leukemia/lymphoma evaluation without e/o B-cell non-Hodgkin lymphoma, noted Polytypic B cells and Hodgkin lymphoma cannot be excluded by flow cytometry, however further w/u deferred as she does not have correlating clinical features. Hepatology consulted 10/24 (see their note), suggest chronic liver disease is likely 2/2 chronically increased right-sided cardiac pressures with resultant passive congestive hepatopathy and hepatic fibrosis.  Some evidence of renal recovery, as creatinine and urine output are improving steadily.  EBV level 13k (10/24).  - Repeat EBV level in one month (~11/24)   - Right heart cath as OP when pt. is euvolemic     Acute on chronic kidney disease: The patient had stage III CKD but appears to have RADHA on admission, possibly d/t supratherapeutic tacrolimus level on admission (31.6, true trough).  It has probably been gradually increasing since the addition of voriconazole at her last visit.  Prior tacrolimus level was 5.3 on 10/10 and she was already developing ascites by 10/13, which seems a little too fast for ascites due to renal failure due to tacrolimus.  Although this is the most likely cause, a broader differential should be maintained. S/p tunneled line 10/25.  Volume removal on first run limited by afib with RVR (10/25), iHD run the following day without recurrance.   - Discharge with qMWF HD schedule near home     We appreciate the excellent care provided by the [place] [id number] team. Recommendations communicated via in person  "rounding and this note. Will continue to follow along closely, please do not hesitate to call with any questions or concerns.     Patient discussed with Dr. Riley.    Hina Huff, DNP, APRN, CNP  Inpatient Nurse Practitioner  Pulmonary CF/Transplant  Pager [phone]     Subjective & Interval History:     Remains on RA with stable DESHPANDE.  HD run yesterday for 2L, oliguria unchanged (250 ml yesterday), and weight continues to downtrend.  Edema continues to gradually improve.  No pain.  VSS and BG stable.    Review of Systems:     ROS as above, otherwise limited d/t pt. availability    Physical Exam:     Vital signs:  Temp: 98.8  F (37.1  C) Temp src: Oral BP: (!) 143/87 Pulse: 81 Heart Rate: 75 Resp: 16 SpO2: 96 % O2 Device: None (Room air) Oxygen Delivery: 1/2 LPM Height: 162.6 cm (5' 4\") Weight: 57.7 kg (127 lb 4.8 oz)  I/O:     Intake/Output Summary (Last 24 hours) at 10/29/2019 1335  Last data filed at 10/29/2019 0500  Gross per 24 hour   Intake 243 ml   Output 2050 ml   Net -1807 ml     PE not completed: pt. unavailable despite 2 attempts    Lines, Drains, and Devices:  Peripheral IV 10/21/19 Left Lower forearm (Active)   Site Assessment [facility] 10/29/2019  8:30 AM   Line Status Saline locked 10/29/2019  8:30 AM   Phlebitis Scale 0-->no symptoms 10/29/2019  8:30 AM   Infiltration Scale 0 10/29/2019  8:30 AM   Infiltration Site Treatment Method  None 10/28/2019  7:00 PM   Extravasation? No 10/28/2019  7:00 PM   Number of days: 8       CVC Double Lumen 10/25/19 Right Internal jugular (Active)   Site Assessment WDL 10/28/2019  7:00 PM   Lumen Soln/Vol REFERENCE 1.9/1.9 10/28/2019  4:30 PM   External Cath Length (cm) 3.5 cm 10/28/2019  1:30 PM   Dressing Intervention New dressing;Transparent;Chlorhexidine sponge 10/28/2019  1:30 PM   Dressing Change Due 11/04/19 10/28/2019  1:30 PM   CVC Comment Dressing CDI 10/28/2019  4:30 PM   Lumen A - Color RED 10/28/2019  4:30 PM   Lumen A - Status saline locked;cap changed " 10/28/2019  4:30 PM   Lumen A - Cap Change Due 11/04/19 10/28/2019  4:30 PM   Lumen B - Color BLUE 10/28/2019  4:30 PM   Lumen B - Status saline locked;cap changed 10/28/2019  4:30 PM   Lumen B - Cap Change Due 11/04/19 10/28/2019  4:30 PM   Extravasation? No 10/28/2019  4:30 PM   Number of days: 4     Data:     LABS    CMP:   Recent Labs   Lab 10/29/19  0617 10/28/19  0553 10/27/19  0621 10/26/19  0626  10/24/19  2032  10/23/19  0322   * 134 135 140   < > 136   < > 135   POTASSIUM 3.9 4.2 4.0 4.2   < > 4.6   < > 4.8   CHLORIDE 101 105 107 111*   < > 110*   < > 109   CO2 24 20 20 19*   < > 14*   < > 11*   ANIONGAP 7 9 8 10   < > 12   < > 15*   * 127* 116* 127*   < > 118*   < > 139*   BUN 21 38* 31* 49*   < > 69*   < > 70*   CR 2.55* 3.94* 3.56* 4.80*   < > 6.37*   < > 6.62*   GFRESTIMATED 20* 12* 13* 9*   < > 7*   < > 6*   GFRESTBLACK 23* 14* 16* 11*   < > 8*   < > 7*   CHELSEY 8.0* 7.9* 7.9* 8.1*   < > 8.2*   < > 8.0*   MAG  --   --   --   --   --   --   --  2.0   PHOS 3.4 4.6* 4.2  --   --   --   --  7.4*   PROTTOTAL  --   --   --   --   --  6.9  --  6.3*   ALBUMIN  --   --   --   --   --  2.6*  --  2.6*   BILITOTAL  --   --   --   --   --  0.2  --  0.3   ALKPHOS  --   --   --   --   --  227*  --  174*   AST  --   --   --   --   --  21  --  12   ALT  --   --   --   --   --  13  --  13    < > = values in this interval not displayed.     CBC:   Recent Labs   Lab 10/29/19  0617 10/28/19  0553 10/26/19  0626 10/24/19  2032   WBC 5.3 5.2 6.8 9.1   RBC 2.98* 3.07* 3.61* 3.98   HGB 8.4* 8.7* 10.1* 11.3*   HCT 28.0* 28.6* 34.1* 37.3   MCV 94 93 95 94   MCH 28.2 28.3 28.0 28.4   MCHC 30.0* 30.4* 29.6* 30.3*   RDW 19.4* 19.6* 19.9* 19.9*   PLT 61* 63* 104* 160       INR: No lab results found in last 7 days.    Glucose:   Recent Labs   Lab 10/29/19  0617 10/28/19  0553 10/27/19  0621 10/26/19  0626 10/25/19  0733 10/24/19  2032   * 127* 116* 127* 131* 118*       Blood Gas: No lab results found in last 7  days.    Culture Data   Recent Labs   Lab 10/22/19  1430   CULT No growth       Virology Data:   Lab Results   Component Value Date    FLUAH1 Negative 10/07/2019    FLUAH3 Negative 10/07/2019    [id number] Negative 10/07/2019    IFLUB Negative 10/07/2019    RSVA Negative 10/07/2019    RSVB Negative 10/07/2019    PIV1 Negative 10/07/2019    PIV2 Negative 10/07/2019    PIV3 Negative 10/07/2019    HMPV Negative 10/07/2019    HRVS Negative 10/07/2019    ADVBE Negative 10/07/2019    ADVC Negative 10/07/2019    ADVC Negative 03/07/2019    ADVC Negative 01/17/2019       Historical CMV results (last 3 of prior testing):  Lab Results   Component Value Date    CMVQNT CMV DNA Not Detected 10/24/2019    CMVQNT CMV DNA Not Detected 10/06/2019    CMVQNT <137 (A) 09/12/2019     Lab Results   Component Value Date    CMVLOG Not Calculated 10/24/2019    CMVLOG Not Calculated 10/06/2019    CMVLOG <2.1 09/12/2019       Urine Studies    Recent Labs   Lab Test 10/21/19  2240 09/12/19  0125   URINEPH 5.0 7.5*   NITRITE Negative Negative   LEUKEST Large* Negative   WBCU 115* 3       Most Recent [place] Pulmonary Function Testing (FVC/FEV1 only)  FVC-Pre   Date Value Ref Range Status   08/07/2019 2.22 L    06/05/2019 2.26 L    03/05/2019 1.97 L    01/16/2019 1.92 L      FVC-%Pred-Pre   Date Value Ref Range Status   08/07/2019 67 %    06/05/2019 70 %    03/05/2019 60 %    01/16/2019 59 %      FEV1-Pre   Date Value Ref Range Status   08/07/2019 1.60 L    06/05/2019 1.65 L    03/05/2019 1.31 L    01/16/2019 1.04 L      FEV1-%Pred-Pre   Date Value Ref Range Status   08/07/2019 61 %    06/05/2019 64 %    03/05/2019 51 %    01/16/2019 40 %        IMAGING    No results found for this or any previous visit (from the past 48 hour(s)).

## 2019-10-29 NOTE — PLAN OF CARE
Pt A&O. VSS on RA. Up ind. Denies pain. R CVC. Tele- SR w BBB. L PIV-SL. Coccyx reddened, refused mepilex and barrier cream.  Oliguric. Will cont to monitor.

## 2019-10-29 NOTE — PROGRESS NOTES
Transplant Social Work Services Progress Note      Date of Initial Social Work Evaluation: 10/22/2019  Collaborated with: n/a    Data: supportive visit with patient prior to discharge.  She is feeling better since starting dialysis.  Daughter coming to pick her and will travel home today.    Intervention: supportive counseling  Assessment: coping well with new health problem   Education provided by SW: n/a  Plan:    Discharge Plans in Progress: home today. dialysis arranged by care coordinator.      Barriers to d/c plan: n/a    Follow up Plan: no further social service needs identified. Encouraged calls if needed.

## 2019-10-29 NOTE — DISCHARGE SUMMARY
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 The following labs/tests are recommended: Tacrolimus   level and Voriconazole level.  Follow up with Dr. Nancy Aguilera, of Infectious Disease, on 11/13/2019 for   hospital follow up.   Follow up with primary care provider, Rosy Vu, within 2-4   weeks, for hospital follow- up. The following labs/tests are recommended:   Consider Right Heart Catheterization once closer to euvolemia.     Appointments on [place] and/or [facility] (with [facility] or [facility]   provider or service). Call [phone] if you haven't heard regarding   these appointments within 7 days of discharge.             Unresulted Labs Ordered in the Past 30 Days of this Admission     Date and Time Order Name Status Description    10/29/2019 0000 Blood Morphology Pathologist Review In process     10/29/2019 0000 Heparin Induced Thrombocytopenia Screen In process     10/11/2019 0938 Nocardia culture Preliminary     10/11/2019 0938 AFB Culture Non Blood Preliminary     10/8/2019 1450 AFB Culture Non Blood Preliminary       These results will be followed up by Pulmonary/Gold 10/Infectious Disease    Discharge Disposition   Discharged to home  Condition at  discharge: Stable    Hospital Course    Kecia Blue is a 58 year old female with a history of bilateral lung transplant (3/2018) secondary to ILD complicated by CMV viremia, anti-synthase syndrome, dermatomyositis, pulmonary HTN, HTN, GERD, and anemia who is admitted on 10/21/19 for RADHA on CKD stage IV as well as for new onset ascites. Baseline creatinine was 1.8 in Oct 2019 visit     # Oliguric RADHA on CKD Stage 4 requiring Hemodialysis  Has had an elevated Cr of 1.2-1.4 since 6/2018. Had seen Nephrology Dr. Gamble on 8/7/19 for elevated creatinine to 1.8. Renal biopsy 8/2019 with arterionephrosclerosis and features concerning for calcineurin toxicity (tacrolimus). Patient recently started on Voriconazole during last admission on 10/6 for empyema with cx + aspergillus. Patient presented with oliguria, elevated ceatinine despite 3 days of outpatient IV fluid boluses (10/18-20). CT scan on admission unremarkable for hydronephrosis or obstructing stone. Also with new ascites s/p therapeutic paracentesis on 10/23 and 10/24 with rapid reaccumulation. Etiology of RADHA likely medication induced secondary to tacrolimus in setting of voriconazole. Given volume challenge on admission with no significant improvement in kidney function making pre-renal etiology unlikely. Bactrim also a possibility but has been on it chronically for some time. No evidence of UTI. No evidence of stenosis on renal US. Nephrology consulted and patient was eventually started on hemodialysis. Tacrolimus and voriconazole doses adjusted by Pulmonology. Outpatient dialysis arranged via Care Coordinator.     # Paroxysmal Afib  Started sfter dialysis, had a run of A. fib with RVR on 10/25. Home Coreg 25 mg twice daily was resumed and has been in normal sinus rhythm since 10/26. Has a history of paroxysmal A. Fib. Not on any anticoagulation. Her [id number] score is 2- for HTN and sex (female). The evidence for AC on paroxysmal Afib is not strong. No  clear studies demonstrating benefit vs risk of bleeding. Other than her low platelets, she currently does not have any bleeding risk factors. Given her ESRD, she would only qualify for Coumadin at this time. No urgent indication for anticoagulation, discussed previously with patient and will not prescribe on discharge.      # New onset ascites:   Reported 23lb weight gain in 3 days prior to admission with CT showing moderate ascites/anasarca.  No history of cirrhosis. CT w/ mild hepatosplenomegaly. Reported abdominal bloating, early satiety and decreased appetite. No abdominal pain or tenderness, fever or leukocytosis.  Abdominal ultrasound done on admission 10/23 shows ascites and possible evidence of liver disease.  No evidence of portal thrombus. Paracentesis performed on 10/23 and 10/24 with rapid reaccumulation. Hepatology consulted and thought ascites and liver disease likely secondary to portal hypertension and cardiac cirrhosis recommending right heart catheterization when closer to euvolemia. If this exam is essentially normal and does not demonstrate significantly elevated right-sided cardiac pressures, would then proceed with a transjugular liver biopsy with portal pressure measurements to determine etiology of cirrhosis. If elevated R -sided pressures, most likely cardiac cirrhosis. IgG, F-actin, AMA checked and negative, no signs of autoimmune disease. Cytology negative on ascitic fluid. No evidence of SBP.      # Hx of Bilateral Lung transplant for ILD:   Transplanted in 3/2018; course complicated bymain bronchial stenosis requiring repeated dilatation, + DSAs, CMV viremia s/p valganciclovir with undetectable viral loads off therapy. Not on PTA O2 use. On PTA prednisone 5mg qAM and 2.5mg qPM and tacrolimus 2.5mg qAM and 2.0mg qPM and Bactrim 400-80mg daily for prophylaxis. Tacrolimus dose adjusted by Pulmonology and discharged on 1 mg qAM and 0.5 mg qPM. Bactrim was discontinued on discharge with  follow up scheduled in less than one week. G6PD checked not deficient so was started on dapsone 100 mg daily.     On discharge IS Regimen:  - Prednisone 5mg qAM and 2.5mg qPM   - Tacrolimus 1mg qAM and 0.5mg qPM     # Hx of left pleural aspergillus empyema and focal left 10th rib osteomyelitis:   Recent admission from 10/6-10/12 for LUQ pain with MRCP (negative), and found to have a left empyema and focal left 10th rib osteomyelitis on CT. CT guided bx 10/8 with purulent fluid positive for Aspergillus fumigatus in cultures. ID was consulted during that admission and patient was started on Voriconazole. CT surgery was consulted and discussed significant risk of morbidity w/ surgery and recommended tx w/ antifungal and serial CT scan for f/u. Voriconazole increased to 250mg BID with repeat level on 11/5. Repeat CT scan performed (read pending) on 10/29 prior to discharge in anticipation of ID follow up. Discussed with Transplant ID prior to discharge and will reschedule outpatient appointment for 11/13 as patient would not be able to make outpatient dialysis and ID follow up.      # Hx of low level CMV viremia:   Peak viral load >3M in 8/2018, was on ganciclovir until 7/25/19. Currently biweekly monitoring of low level CMV viremia. Not detected 10/6/19. Repeat CMV level to be drawn 11/7     # HTN:   On PTA Amlodipine 10mg daily, Carvedilol 25mg BID, and Imdur 120mg daily. Pt reports BP had been increasing and labile over the past 6 months. Was started on Carvedilol, Imdur during previous hospitalization. Blood pressures labile but trended toward normal. Resumed carvedilol 25 mg twice daily with hold parameters given her episode of A. fib with RVR.  Currently in sinus rhythm.  Continue to hold amlodipine 10 mg and Imdur 120 mg and restart as needed as outpatient.     # Anemia  # Thrombocytopenia:   Hgb 9.4 on admission. BL appears 8-9. Plt 134 on admission. Newly thrombocytopenic of 61. Had been decreasing slowly since  10/6. Unclear etiology, potentially medication vs HIT vs bone marrow suppression. Peripheral smear ordered. HIT panel ordered. Discussed the need for citrate in catheter with Nephrology who will relay information to outpatient dialysis center until HIT panel returns.      # Gallbladder wall thickening with cholelithiasis  # Mild wall thickening of cecus, ascending colon, and splenic flexture:   CT w/ Gallbladder wall thickening w/ cholelithasis. Findings may represent reactive changes to ascites. Acute cholecystitis cannot be ruled out.  This has been evidence on previous imaging studies.  MRI abd 9/12 w/o choledocholithaisis or obstructing mass. No RUQ pain. No WBC or elevated total bili, ALT or AST. Do not have concern for cholecystitis at this time. CT also with mild wall thickening involving cecus, ascending colon, and splenic flexture could be reactive; vs mild colitis is also on ddx per Radiology. On exam, abdominal exam is benign.   - Monitor     # Dermatomyositis  # Seronegative RA:   No active treatments. Has had increasing bilateral knee pain which has been assessed with OP MRI. Follows with Rheumatology as OP.       Consultations This Hospital Stay   TRANSPLANT SURGERY LIVER ADULT IP CONSULT  PULMONARY CF/TRANSPLANT ADULT IP CONSULT  NEPHROLOGY GENERAL ADULT IP CONSULT  INTERNAL MEDICINE PROCEDURE TEAM ADULT IP CONSULT [facility]  INTERNAL MEDICINE PROCEDURE TEAM ADULT IP CONSULT [facility]  MEDICATION HISTORY IP PHARMACY CONSULT  INTERNAL MEDICINE PROCEDURE TEAM ADULT IP CONSULT [facility]  GI HEPATOLOGY ADULT IP CONSULT  INTERVENTIONAL RADIOLOGY ADULT/PEDS IP CONSULT  NUTRITION SERVICES ADULT IP CONSULT    Code Status   Full Code    Time Spent on this Encounter   I, Jackson Schwartz MD, personally saw the patient today and spent greater than 30 minutes discharging this patient.       Jackson Schwartz MD  [facility],  "[place]  ______________________________________________________________________    Physical Exam   BP (!) 143/87 (BP Location: Right arm)   Pulse 81   Temp 98.8  F (37.1  C) (Oral)   Resp 16   Ht 1.626 m (5' 4\")   Wt 57.7 kg (127 lb 4.8 oz)   LMP 06/07/2014 (Exact Date)   SpO2 96%   BMI 21.85 kg/m    General: AAOx3, well appearing woman in NAD  Skin: no rashes or bruising  CV: RRR, normal S1S2, no murmur  Resp: CTAB, no wheeze, rhonchi   Abd: Soft, non-tender, distended, BS+, no masses appreciated  Extremities: warm and well perfused, trace edema  Neuro: No lateralizing symptoms or focal neurologic deficits       Primary Care Physician   Rosy Vu    Discharge Orders      Tacrolimus level     Voriconazole Level     Reason for your hospital stay    Dear Kecia Blue,    Your were hospitalized at [facility] with acute kidney injury and treated with dialysis.  Over your hospitalization your condition improved and today you are ready to be discharged home.  You should continue to improve but if you develop fever, shortness of breath, difficulty breathing, swelling, or confusion please seek medical attention.    We are suggesting the following medication changes:  Hold amlodipine and imdur until follow up with Nephrology.  Stop bactrim for prophylaxis until follow up with Pulmonary.  Decrease tacrolimus to 1mg in the AM and 0.5mg in the PM.  Increase voriconazole to 250 mg twice daily.    Please get the following tests done:  Tacrolimus level on 11/1 and 11/5  Voriconazole level on 11/5    Please set up an appointment with:  Pulmonary as previously scheduled on 11/5  Dr. Nancy Aguilera of Infectious Disease on 11/13    It was a pleasure meeting with you today. Thank you for allowing me and my team the privilege of caring for you over the course of your hospitalization. You are the reason we are here, and I truly hope we provided you with the excellent service you " deserve. Please let us know if there is anything else we can do for you so that we can be sure you are leaving completely satisfied with your care experience.    Your hospital unit at the time of discharge is 5A so if you have any questions please call the hospital at [phone] and ask to talk to a nurse on 5A.     Sincerely,    Jackson Schwartz  Internal Medicine Hospitalist  [facility]     Adult [facility] Follow-up and recommended labs and tests    Follow up with Pulmonary as previously scheduled on 11/5/2019 for hospital follow- up. The following labs/tests are recommended: Tacrolimus level and Voriconazole level.  Follow up with Dr. Nancy Aguilera, of Infectious Disease, on 11/13/2019 for hospital follow up.     Appointments on [place] and/or [facility] (with [facility] or [facility] provider or service). Call [phone] if you haven't heard regarding these appointments within 7 days of discharge.     Activity    Your activity upon discharge: activity as tolerated     When to contact your care team    Call your primary doctor if you have any of the following: temperature greater than 101F, increased shortness of breath, increased swelling or increased pain.     Adult [facility] Follow-up and recommended labs and tests    Follow up with primary care provider, Rosy Vu, within 2-4 weeks, for hospital follow- up. The following labs/tests are recommended: Consider Right Heart Catheterization once closer to euvolemia.     Appointments on [place] and/or [facility] (with [facility] or [facility] provider or service). Call [phone] if you haven't heard regarding these appointments within 7 days of discharge.     Full Code     Diet    Follow this diet upon discharge: Orders Placed This Encounter      Snacks/Supplements Adult: Boost Shake; Between Meals      2 Gram Sodium Diet       Significant Results and Procedures   Most Recent 3 BMP's:  Recent Labs   Lab Test 10/29/19  0617 10/28/19  0570  10/27/19  0621   * 134 135   POTASSIUM 3.9 4.2 4.0   CHLORIDE 101 105 107   CO2 24 20 20   BUN 21 38* 31*   CR 2.55* 3.94* 3.56*   ANIONGAP 7 9 8   CHELSEY 8.0* 7.9* 7.9*   * 127* 116*   ,   Results for orders placed or performed during the hospital encounter of 10/21/19   CT Abdomen Pelvis w/o Contrast     Value    Radiologist flags Gallbladder    Narrative    EXAMINATION: CT abdomen and pelvis without contrast on 10/21/2019.    INDICATION: Abdominal bloating, acute kidney injury???evaluate for  hydronephrosis. History of rheumatoid arthritis, interstitial lung  disease, dermatomyositis, Raynaud's disease, pulmonary hypertension  and antisynthetase syndrome. Bilateral lung transplant 3/1/2018.  CT  guided left fluid collection aspiration positive for aspergillus    COMPARISON: MR dated 9/12/2019. CT dated 8/20/2018.  Outside CT  10/1/2019    TECHNIQUE: Routine images from the level of the diaphragm through the  pelvis were obtained without contrast.   Total DLP: 840 mGy*cm.    FINDINGS: Limited evaluation of the abdomen and pelvis due to  noncontrast examination.    Lines and tubes: None.    Lower thorax: Cardiomegaly. Trace pericardial effusion. Small  bilateral pleural effusions. Bibasilar fibroatelectasis.  Rounded soft  tissue density structure in the medial left costophrenic angle is not  substantially changed measuring about 3.8 x 2.5 cm (series 5, image  62) with unchanged mild cortical irregularity of the left 10th rib.     Liver: Hepatomegaly. No focal lesions seen on this noncontrast study.    Gallbladder and bile ducts: Equivocal gallbladder wall thickening is  nonspecific in the setting of ascites. Cholelithiasis.    Pancreas: Unremarkable noncontrast appearance.    Spleen: Measures 13.5 cm in the craniocaudal axis.     Kidneys and ureters: No hydronephrosis or stones.     Adrenal glands: No nodules.    Stomach and bowel: No definite small or large bowel dilation.  Mild  wall thickening  involving the cecum and ascending colon and to a  lesser extent splenic flexure could be reactive in the setting of  ascites.  Visualized appendix is unremarkable.    Pelvic organs: Decompressed urinary bladder. Pelvic phleboliths.  Probable uterine fibroid without significant change.    Peritoneum/retroperitoneum: Moderate ascites. No intra-abdominal free  air.    Vessels: No abdominal aortic aneurysm. Scattered atherosclerotic  calcifications of the aorta and branching vessels.    Lymph nodes: No definite abnormally enlarged lymph nodes are seen on  this noncontrast study.  Few prominent nonspecific retroperitoneal  lymph nodes.  Symmetric prominent inguinal lymph nodes.    Bones and soft tissues: No acute osseous abnormalities. Diffuse soft  tissue edema.  Sternotomy.      Impression    IMPRESSION:  1. Moderate ascites/anasarca significantly increased since 9/12/2019  and 10/1/2019.  2. Gallbladder wall thickening with cholelithiasis. Findings may  represent reactive changes to ascites. However acute cholecystitis  cannot be ruled out. Consider ultrasound for further evaluation as  clinically indicated.  3. Postoperative changes bilateral lung transplantation. Rounded soft  tissue density structure, presumably known empyema, in the medial left  costophrenic angle is not substantially changed with unchanged mild  cortical irregularity of the left 10th rib.  Attention on follow-up is  advised.  4. Mild hepatosplenomegaly.  5. Small bilateral pleural effusions.  6. No hydronephrosis or renal stones in either kidney.  7.  Mild wall thickening involving the cecum, ascending colon and  splenic flexure could be reactive.  Alternatively mild colitis is a  consideration in the appropriate clinical context.    [Consider Follow Up: Gallbladder]    This report will be copied to the [facility] to ensure a  provider acknowledges the finding.                I have personally reviewed the examination and initial  interpretation  and I agree with the findings.    KEYLA DACOSTA MD   US Renal Complete w Duplex Complete    Narrative    Exam: Duplex Doppler ultrasound of the kidneys and bilateral renal  arteries dated 10/22/2019 10:09 AM    Comparison Study: MRI abdomen 9/12/2019    Clinical Information: Evaluate for renal artery stenosis, history of  acute kidney injury with uncontrolled hypertension    Technique: B-mode (grayscale) and duplex Doppler evaluation of the  abdominal aorta and renal arteries performed. Velocity measurements  obtained with angle correction at or less than 60 degrees.       Findings:    The right kidney measures 8.4 cm in length. The left kidney measures  9.2 cm in length. Cortical thickness and echogenicity is normal. No  evidence of stones, masses or hydronephrosis.    Peak systolic velocities in the abdominal aorta are:    Supine and infrarenal aorta is not visualized.     Right renal artery velocities:    Origin: Not visualized  Mid renal artery: Not visualized   Hilum/distal renal artery: 30 cm/sec    Resistive indices in the arcuate arteries:     Not visualized    Patent right renal vein.    Left renal artery velocities:    Origin: Not visualized   Mid renal artery: 41 cm/sec  Hilum/distal renal artery: 48 cm/sec    Resistive indices in the arcuate arteries:     Inferior pole: Not visualized   Mid pole: 0.7  Superior pole: Not visualized     Patent left renal vein.      Impression    Impression:    1. Right kidney:    1a. Renal parenchyma: Atrophic with no hydronephrosis or shadowing  calculus  1b. Renal artery: Limited evaluation with nonvisualization of  intrarenal arcuate arteries and proximal right renal artery; normal  velocity and waveform at the right renal hilum    2. Left kidney:    2a. Renal parenchyma: No hydronephrosis or shadowing calculus  2b. Renal artery: Limited evaluation with nonvisualization of superior  and inferior arcuate arteries and proximal left renal artery;  normal  velocity and waveforms in the mid renal artery, left renal hilum.    I have personally reviewed the examination and initial interpretation  and I agree with the findings.    JAMES LAI MD   US Abdomen Limited w Doppler Complete    Narrative    EXAMINATION: US ABDOMEN LIMITED WITH DOPPLER COMPLETE, 10/22/2019  10:09 AM     COMPARISON: MRI abdomen 9/12/2019; ultrasound abdomen 9/11/2019    HISTORY: Evaluate for portal hypertension or thrombus; new onset  ascites    TECHNIQUE: The abdomen was scanned in standard fashion with  specialized ultrasound transducer(s) using both grey scale, color  Doppler, and spectral flow techniques.    Findings:    Liver: The liver demonstrates coarse echotexture. No evidence of a  focal hepatic mass or intrahepatic biliary ductal dilatation.     Extrahepatic portal vein flow is antegrade, measuring 19 cm/sec.  Left portal vein flow is antegrade, measuring 13 cm/sec.  Right portal vein flow is antegrade, measuring 9 cm/sec.    Flow in the hepatic artery is towards the liver and:  38 cm/sec peak systolic  0.87 resistive index.   Left hepatic artery is not visualized.    Splenic vein is not visualized. The left, middle, and right hepatic  veins are patent with flow towards the IVC. The IVC is not  well-visualized.     Gallbladder: Diffuse thickening of the gallbladder wall measuring up  to 13 mm. Punctate echogenic intraluminal foci with no shadowing may  represent sludge ball versus polyp (image 24 & 27). No stones are  detected.     Bile Ducts: Both the intra- and extrahepatic biliary system are of  normal caliber.  The common bile duct measures 6 mm in diameter.    Pancreas: Pancreas obscured by bowel gas.    Kidneys: Atrophic right kidney measuring 8.4 cm, detailed description  in renal duplex ultrasound done on same date.    Fluid: Ascites.      Impression    Impression:  1. Coarse hepatic echotexture, which may be seen with intrinsic  parenchymal liver disease.  2.  Moderate ascites.  3. Patent visualized hepatic and portal vessels.  4. Nonspecific circumferential gallbladder wall thickening which may  be seen with ascites and systemic/hepatic disease.  5. Punctate nonmobile, non shadowing echogenic foci in the  gallbladder, may represent adherent sludge ball versus polyp.    I have personally reviewed the examination and initial interpretation  and I agree with the findings.    JAMES LAI MD   POC US Guide for Paracentesis    Narrative    Moderate ascites in RLQ, accessible for bedside paracentesis.   XR Chest 2 Views    Narrative    Exam: XR CHEST 2 VW, 10/23/2019 9:43 AM    Indication: lung transplant with dyspnea    Comparison: 10/6/2019    Findings:   Increasing right pleural effusion. There is associated atelectasis.  Linear atelectasis at the left lung base. Mild pulmonary venous  congestion. Heart within normal limits. Low lung volume.      Impression    Impression:   1. Low lung volumes versus mild pulmonary venous congestion.  2. Increasing right pleural effusion with associated atelectasis.  Consider pneumonia clinically.  3. Increasing left lower lobe atelectasis.    ANNE MANZANO MD   IR CVC Tunnel Placement > 5 Yrs of Age    Narrative    PROCEDURES:  1. Ultrasound guidance for venous access  2. Placement centrally inserted catheter (age > 5 yrs.) tunneled, no  port, no pump  3. Fluoroscopic guidance placement    Clinical History: Renal failure, requires access for dialysis.  Tunneled dialysis catheter placement requested.    Comparisons: 10/8/2019    Staff Radiologist: Kel Dawson MD    Fellow(s)/Resident(s): MD Morgan García MD    I, KEL DAWSON MD, attest that I was present for all critical portions  of the procedure and was immediately available to provide guidance and  assistance during the remainder of the procedure.    Medications: The patient was placed on continuous monitoring.  Intravenous sedation was administered. 2 mg  Versed and 100 mcg  fentanyl IV. Vital signs and sedation monitored by nursing staff under  Interventional Radiologists supervision. The patient remained stable  throughout the procedure.    Attending face-to-face sedation time: Less than 5 minutes.    Fluoroscopy time: 1.7 minutes.    PROCEDURE: The patient understood the limitations, alternatives, and  risks of the procedure and requested the procedure be performed. Both  written and oral consent were obtained.    Limited jugular ultrasound documented jugular vein patency.    The right neck and upper chest were prepped and draped in the usual  sterile fashion. 1% lidocaine without epinephrine was used for local  anesthesia.     Under ultrasound guidance, right internal jugular venotomy was made  with a micropuncture needle. Permanent copy of the image documenting  the vein was entered into the patients record.    Micropuncture needle exchanged over guidewire for the micropuncture  sheath under fluoroscopic guidance. Catheter length measured with the  0.018 guidewire. Micropuncture sheath saline locked. A 23 cm tip to  cuff 14.5 Palindrome dialysis catheter was subcutaneously tunneled  from the right anterior chest to the right internal jugular venotomy  site. Micropuncture sheath exchanged over guidewire for the peel-away  sheath. Guidewire removed. Under fluoroscopic guidance, the catheter  placed through a peel-away sheath and positioned with its tip in the  right atrium.     Fluoroscopic image documenting catheter placement a position was saved  in the patient's record.    Both lumens flushed and aspirated adequately. Each lumen heparin  locked with 1000:1 heparin solution. 2-0 nylon catheter retaining  suture and sterile dressing applied. Right internal jugular venotomy  site closed with Dermabond and 3-0 Vicryl deep dermal suture. No  immediate complication.      Impression    IMPRESSION:   1. Ultrasound guided right internal jugular venotomy.  2.  Twenty-three cm tip to cuff 14.5 [place] Vaxcel tunneled dialysis  catheter placed under fluoroscopic guidance with the tip in the right  atrium. Both lumens heparin locked and ready for use.    I have personally reviewed the examination and initial interpretation  and I agree with the findings.    OBIE DAWSON MD   XR Abdomen Port 1 View    Narrative    Exam: XR ABDOMEN PORT 1 VW, 10/24/2019 8:17 PM    Indication: epigastric pain - obtain upright film    Comparison: None available    Findings:   Single upright frontal view of the abdomen. No abnormally dilated  loops of small bowel or colon. No free air in the abdomen. Median  sternotomy wires. Bibasilar atelectasis and pleural effusion.      Impression    Impression: No free air in the abdomen identified.    I have personally reviewed the examination and initial interpretation  and I agree with the findings.    ADAM GONZALEZ MD   Echocardiogram Complete    Narrative    [id number]  [id number]  [id number]  [id number]^BRANDEE^NIYAH^[facility] Center[facility]ENZO           [facility],[place]  Echocardiography Laboratory  [address] [place] [address]     Name: SARAI KILLIAN  MRN: [id number]  : 1960  Study Date: 10/23/2019 09:53 AM  Age: 58 yrs  Gender: Female  Patient Location: [facility]  Reason For Study: Heart Failure  Ordering Physician: NIYAH IRVIN  Performed By: MARTHA Montes     BSA: 1.7 m2  Height: 64 in  Weight: 148 lb  HR: 78  BP: 125/74 mmHg  _____________________________________________________________________________  __        Procedure  Echocardiogram with two-dimensional, color and spectral Doppler performed.  _____________________________________________________________________________  __        Interpretation Summary  Left ventricular function, chamber size, wall motion, and wall thickness are  normal.The EF is 60-65%.  Right ventricular function, chamber size, wall motion, and thickness are  normal.  Mild to moderate  tricuspid insufficiency is present.  Mild pulmonary hypertension is present.  Pulmonary artery systolic pressure is 48 mmHg (33 mmHg+RA pressure).  This study was compared with the study from 3/20/18: There has been no  significant change.  _____________________________________________________________________________  __        Left Ventricle  Left ventricular function, chamber size, wall motion, and wall thickness are  normal.The EF is 60-65%. Left ventricular diastolic function is indeterminate.     Right Ventricle  Right ventricular function, chamber size, wall motion, and thickness are  normal.     Atria  The right atria appears normal. Mild left atrial enlargement is present. The  atrial septum is intact as assessed by color Doppler .     Mitral Valve  The mitral valve is normal. Mild mitral insufficiency is present.        Aortic Valve  Aortic valve is normal in structure and function. The aortic valve is  tricuspid.     Tricuspid Valve  Mild to moderate tricuspid insufficiency is present. Mild pulmonary  hypertension is present. Pulmonary artery systolic pressure is 48 mmHg (33  mmHg+RA pressure).     Vessels  The aorta root is normal. The pulmonary artery cannot be assessed. Dilation of  the inferior vena cava is present with abnormal respiratory variation in  diameter. IVC diameter >2.1 cm collapsing <50% with sniff suggests a high RA  pressure estimated at 15 mmHg or greater.     Pericardium  No pericardial effusion is present.     Miscellaneous  A left pleural effusion is present. Ascites is noted.        Compared to Previous Study  This study was compared with the study from 3/20/18 . There has been no  significant change.  _____________________________________________________________________________  __  MMode/2D Measurements & Calculations     RVDd: 3.6 cm  IVSd: 1.1 cm  LVIDd: 4.1 cm  LVIDs: 2.9 cm  LVPWd: 0.87 cm  FS: 30.3 %  LV mass(C)d: 129.7 grams  LV mass(C)dI: 75.4 grams/m2  asc Aorta Diam: 3.0  cm  LVOT diam: 1.8 cm  LVOT area: 2.5 cm2  LA Volume Index (BP): 41.4 ml/m2  RWT: 0.42  TAPSE: 1.2 cm           Doppler Measurements & Calculations  MV E max herberth: 83.8 cm/sec  MV A max herberth: 78.5 cm/sec  MV E/A: 1.1  MV dec slope: 386.0 cm/sec2  MV dec time: 0.22 sec  TV max P.5 mmHg  PA acc time: 0.12 sec  TR max herberth: 285.0 cm/sec  TR max P.5 mmHg  E/E' av.2  Lateral E/e': 7.4  Medial E/e': 11.1     _____________________________________________________________________________  __           Report approved by: Glen Medina 10/23/2019 01:20 PM        *Note: Due to a large number of results and/or encounters for the requested time period, some results have not been displayed. A complete set of results can be found in Results Review.       Discharge Medications   Current Discharge Medication List      CONTINUE these medications which have CHANGED    Details   tacrolimus (GENERIC EQUIVALENT) 0.5 MG capsule Take 2 capsules (1 mg) by mouth every morning AND 1 capsule (0.5 mg) every evening.  Qty: 90 capsule, Refills: 3    Associated Diagnoses: S/P lung transplant (H)      voriconazole (VFEND) 50 MG tablet Take 5 tablets (250 mg) by mouth every 12 hours  Qty: 300 tablet, Refills: 0    Associated Diagnoses: S/P lung transplant (H); Aspergillus (H); Empyema of lung (H)         CONTINUE these medications which have NOT CHANGED    Details   acetaminophen (TYLENOL) 500 MG tablet Take 1,000 mg by mouth every 6 hours as needed for pain       calcium-vitamin D (CALTRATE) 600-400 MG-UNIT per tablet Take 1 tablet by mouth daily    Associated Diagnoses: S/P lung transplant (H); ILD (interstitial lung disease) (H); Lung transplant recipient (H); Encounter for long-term (current) use of high-risk medication; Anemia, unspecified type; Cytomegalovirus (CMV) viremia (H)      carvedilol (COREG) 25 MG tablet Take 1 tablet (25 mg) by mouth 2 times daily (with meals)  Qty: 60 tablet, Refills: 0    Associated Diagnoses:  Essential hypertension      dronabinol (MARINOL) 5 MG capsule Take 5 mg by mouth 2 times daily (before meals) as needed for nausea      ferrous sulfate (FEROSUL) 325 (65 Fe) MG tablet Take 1 tablet (325 mg) by mouth daily (with breakfast)  Qty: 60 tablet, Refills: 2    Associated Diagnoses: S/P lung transplant (H)      magnesium oxide (MAG-OX) 400 MG tablet Take 400 mg by mouth daily       mirtazapine (REMERON SOL-TAB) 15 MG ODT 1 tablet (15 mg) by Orally disintegrating tablet route At Bedtime  Qty: 30 tablet, Refills: 0    Associated Diagnoses: Severe protein-calorie malnutrition (H)      multivitamin, therapeutic with minerals (THERA-VIT-M) TABS tablet Take 1 tablet by mouth daily  Qty: 30 each, Refills: 11    Associated Diagnoses: Lung transplant recipient (H)      ondansetron (ZOFRAN) 4 MG tablet Take 1 tablet (4 mg) by mouth every 12 hours as needed for nausea  Qty: 60 tablet, Refills: 0    Associated Diagnoses: Intractable vomiting with nausea, unspecified vomiting type      pantoprazole (PROTONIX) 40 MG EC tablet Take 1 tablet (40 mg) by mouth 2 times daily  Qty: 60 tablet, Refills: 11    Associated Diagnoses: Gastroesophageal reflux disease without esophagitis; ILD (interstitial lung disease) (H); Lung transplant recipient (H)      predniSONE (DELTASONE) 2.5 MG tablet Take two tablets (5mg) every AM and one tablet (2.5mg) every evening  Qty: 90 tablet, Refills: 11    Associated Diagnoses: Lung replaced by transplant (H)      senna-docusate (SENOKOT-S/PERICOLACE) 8.6-50 MG tablet Take 2 tablets by mouth 2 times daily as needed for constipation  Qty: 60 tablet, Refills: 0    Associated Diagnoses: Constipation, unspecified constipation type         STOP taking these medications       amLODIPine (NORVASC) 10 MG tablet Comments:   Reason for Stopping:         isosorbide mononitrate (IMDUR) 120 MG 24 HR ER tablet Comments:   Reason for Stopping:         sulfamethoxazole-trimethoprim (BACTRIM/SEPTRA) 400-80 MG  tablet Comments:   Reason for Stopping:         tacrolimus (GENERIC EQUIVALENT) 1 MG capsule Comments:   Reason for Stopping:             Allergies   No Known Allergies

## 2019-10-29 NOTE — PLAN OF CARE
"BP (!) 143/87 (BP Location: Right arm)   Pulse 81   Temp 98.8  F (37.1  C) (Oral)   Resp 16   Ht 1.626 m (5' 4\")   Wt 57.7 kg (127 lb 4.8 oz)   LMP 06/07/2014 (Exact Date)   SpO2 96%   BMI 21.85 kg/m      Time 8118-2704      Reason for admission: Acute kidney injury  Vitals: VSS on RA  Activity: Up Ind   Pain: Denies pain  Neuro: A&Ox4, speech logical  Mood/Behavior: calm, cooperative  Cardiac: RRR,  no edema BLE  Respiratory: LS clear, infrequent cough  GI/: loose BMs, oliguric-pt is on HD  Diet: 2g Na   Skin: CDI      New changes this shift: Pt had chest CT this morning. Showered this afternoon. Pt will be discharging home this afternoon.                  "

## 2019-10-30 NOTE — PROGRESS NOTES
[facility]: Post-Discharge Note  SITUATION                                                      Admission:    Admission Date: 10/21/19   Reason for Admission: Oliguric RADHA on CKD Stage 4 requiring Hemodialysis  Discharge:   Discharge Date: 10/29/19  Discharge Diagnosis: Oliguric RADHA on CKD Stage 4 requiring Hemodialysis  Discharge Service: Hospitalist    BACKGROUND                                                      Kecia Blue is a 58 year old female with a history of bilateral lung transplant (3/2018) secondary to ILD complicated by CMV viremia, anti-synthase syndrome, dermatomyositis, pulmonary HTN, HTN, GERD, and anemia who is admitted on 10/21/19 for RADHA on CKD stage IV as well as for new onset ascites. Baseline creatinine was 1.8 in Oct 2019 visit     ASSESSMENT      Discharge Assessment  Patient reports symptoms are: Improved  Does the patient have all of their medications?: Yes  Does patient know what their new medications are for?: Not applicable  Does patient have a follow-up appointment scheduled?: Yes  Does patient have any other questions or concerns?: No    Post-op  Did the patient have surgery or a procedure: No  Fever: No  Chills: No  Eating & Drinking: eating and drinking without complaints/concerns  PO Intake: other(2 gram sodium diet)  Bowel Function: normal  Urinary Status: voiding without complaint/concerns    PLAN                                                      Outpatient Plan:      Follow up with Pulmonary as previously scheduled on 11/5/2019 for hospital follow- up. The following labs/tests are recommended: Tacrolimus level and Voriconazole level.  Follow up with Dr. Nancy Aguilera, of Infectious Disease, on 11/13/2019 for hospital follow up.     Future Appointments   Date Time Provider Department Center   11/5/2019  9:40 AM UCCT2 UCCCT [facility]   11/5/2019 10:45 AM UCXR1 UCCXR [facility]   11/5/2019 12:45 PM UC LAB UCLAB [facility]   11/5/2019  2:00 PM UC PFL CF UCPFT  [facility]   11/5/2019  2:50 PM Ame Chow PA-C CLS [facility]   11/13/2019  2:00 PM Nancy Aguilera MD [facility]   [facility]   12/18/2019 10:00 AM Vale Mas MD [facility]           Kori Wolff, CMA

## 2019-10-31 ENCOUNTER — TELEPHONE (OUTPATIENT)
Dept: TRANSPLANT | Facility: CLINIC | Age: 59
End: 2019-10-31

## 2019-10-31 NOTE — TELEPHONE ENCOUNTER
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 Per Dr. Riley, no need to repeat CT scan at follow up.     Need for home health: N/A  Need for pulmonary rehab: N/A    Next RTC date/orders placed: Currently scheduled for 11/5, but patient reports she is now doing dialysis T/Th/Sa as this works better for her than MWF. Will reschedule for 11/11 with keeping an eye out for cancellations on 11/13 as patient would need to come back again this date for an ID appointment.     Next lab draw date: 11/1/19, orders faxed to local lab, reminded to not take vori also before lab draw.     Addressed patient/family questions and concerns. Patient reports feeling a lot better. Appreciated help with rescheduling appointments.

## 2019-11-01 DIAGNOSIS — Z94.2 LUNG REPLACED BY TRANSPLANT (H): ICD-10-CM

## 2019-11-01 PROCEDURE — 80197 ASSAY OF TACROLIMUS: CPT | Performed by: INTERNAL MEDICINE

## 2019-11-04 LAB
TACROLIMUS BLD-MCNC: 8.7 UG/L (ref 5–15)
TME LAST DOSE: NORMAL H

## 2019-11-05 ENCOUNTER — TELEPHONE (OUTPATIENT)
Dept: TRANSPLANT | Facility: CLINIC | Age: 59
End: 2019-11-05

## 2019-11-05 DIAGNOSIS — Z94.2 LUNG REPLACED BY TRANSPLANT (H): ICD-10-CM

## 2019-11-05 PROCEDURE — 80197 ASSAY OF TACROLIMUS: CPT | Performed by: INTERNAL MEDICINE

## 2019-11-05 NOTE — LETTER
PHYSICIAN ORDERS      DATE & TIME ISSUED: 2019 2:28 PM  PATIENT NAME: Kecia Blue   : 1960     [facility] MR# [if applicable]: [id number]     DIAGNOSIS:  Lung Transplant  Z94.2    Please add on GGT to labs from 19.   Please call Mikayla if NOT possible to add-on. Thanks.     Any questions please call: Mikayla [phone]    Please fax these results to [phone].        Ame Chow PA-C

## 2019-11-05 NOTE — RESULT ENCOUNTER NOTE
Armand Mcdonnell, your tacrolimus level was 8.7 at 12 hours on 11/1/19 which is within your goal range of 8-10. No dose change at this time. Please call the transplant office ([phone]) with any questions. Thanks, Mikayla

## 2019-11-05 NOTE — TELEPHONE ENCOUNTER
DATE:  11/5/2019   TIME OF RECEIPT FROM LAB:  1341  LAB TEST:  Alk Phos  LAB VALUE:  308  RESULTS GIVEN WITH READ-BACK TO (PROVIDER):  Mikayla Latham    TIME LAB VALUE REPORTED TO PROVIDER:   [id number]

## 2019-11-05 NOTE — TELEPHONE ENCOUNTER
Per Dr. Aguilera, add on GGT if possible, otherwise do with next labs. Order faxed to local lab and asked that they call back if they are NOT able to add this on.

## 2019-11-05 NOTE — TELEPHONE ENCOUNTER
Patient denies Tylenol use. On voriconazole. Will notify Ame and ID provider. RTC next week with both pulmonary and ID.

## 2019-11-05 NOTE — TELEPHONE ENCOUNTER
Dr Stark (local nephrology) calls today regarding low platelets.  Level yesterday at dialysis was 40,000, then repeat was 58,000, and we have another level today that was 69,000.  He states no effect from what they are doing in dialysis and so requested we follow closely.    Will continue to monitor.  RTC with us 11/11/19 Dr Riley.  Coordinator notified.

## 2019-11-07 DIAGNOSIS — Z94.2 S/P LUNG TRANSPLANT (H): ICD-10-CM

## 2019-11-07 LAB
TACROLIMUS BLD-MCNC: 11 UG/L (ref 5–15)
TME LAST DOSE: NORMAL H

## 2019-11-07 RX ORDER — TACROLIMUS 0.5 MG/1
0.5 CAPSULE ORAL 2 TIMES DAILY
Qty: 60 CAPSULE | Refills: 11 | Status: SHIPPED | OUTPATIENT
Start: 2019-11-07 | End: 2019-11-12

## 2019-11-07 NOTE — RESULT ENCOUNTER NOTE
Tacrolimus level 11 at 12 hours, on 11/5/19  Goal 8-10.   Current dose 1 mg in AM, 0.5 mg in PM    Dose changed to  0.5 mg in AM, 0.5 mg in PM   Recheck level in 5 days    Discussed with Pt    MyChart message sent

## 2019-11-08 DIAGNOSIS — Z94.2 LUNG REPLACED BY TRANSPLANT (H): Primary | ICD-10-CM

## 2019-11-08 LAB
BACTERIA SPEC CULT: NORMAL
SPECIMEN SOURCE: NORMAL

## 2019-11-11 ENCOUNTER — ANCILLARY PROCEDURE (OUTPATIENT)
Dept: GENERAL RADIOLOGY | Facility: CLINIC | Age: 59
End: 2019-11-11
Attending: PHYSICIAN ASSISTANT
Payer: COMMERCIAL

## 2019-11-11 ENCOUNTER — OFFICE VISIT (OUTPATIENT)
Dept: TRANSPLANT | Facility: CLINIC | Age: 59
End: 2019-11-11
Attending: INTERNAL MEDICINE
Payer: COMMERCIAL

## 2019-11-11 VITALS
BODY MASS INDEX: 22.26 KG/M2 | SYSTOLIC BLOOD PRESSURE: 155 MMHG | DIASTOLIC BLOOD PRESSURE: 95 MMHG | HEART RATE: 84 BPM | WEIGHT: 129.7 LBS | OXYGEN SATURATION: 100 % | TEMPERATURE: 98.2 F

## 2019-11-11 DIAGNOSIS — Z94.2 S/P LUNG TRANSPLANT (H): ICD-10-CM

## 2019-11-11 DIAGNOSIS — I10 ESSENTIAL HYPERTENSION: ICD-10-CM

## 2019-11-11 DIAGNOSIS — Z94.2 LUNG REPLACED BY TRANSPLANT (H): ICD-10-CM

## 2019-11-11 DIAGNOSIS — Z94.2 S/P LUNG TRANSPLANT (H): Primary | ICD-10-CM

## 2019-11-11 DIAGNOSIS — R11.2 INTRACTABLE VOMITING WITH NAUSEA, UNSPECIFIED VOMITING TYPE: ICD-10-CM

## 2019-11-11 DIAGNOSIS — B44.9 ASPERGILLUS (H): ICD-10-CM

## 2019-11-11 DIAGNOSIS — J86.9 EMPYEMA OF LUNG (H): ICD-10-CM

## 2019-11-11 LAB
ALBUMIN SERPL-MCNC: 3 G/DL (ref 3.4–5)
ALP SERPL-CCNC: 316 U/L (ref 40–150)
ALT SERPL W P-5'-P-CCNC: 27 U/L (ref 0–50)
ANION GAP SERPL CALCULATED.3IONS-SCNC: 6 MMOL/L (ref 3–14)
AST SERPL W P-5'-P-CCNC: 30 U/L (ref 0–45)
BILIRUB SERPL-MCNC: 0.4 MG/DL (ref 0.2–1.3)
BUN SERPL-MCNC: 22 MG/DL (ref 7–30)
CALCIUM SERPL-MCNC: 8.7 MG/DL (ref 8.5–10.1)
CHLORIDE SERPL-SCNC: 100 MMOL/L (ref 94–109)
CO2 SERPL-SCNC: 31 MMOL/L (ref 20–32)
CREAT SERPL-MCNC: 2.02 MG/DL (ref 0.52–1.04)
ERYTHROCYTE [DISTWIDTH] IN BLOOD BY AUTOMATED COUNT: 18.3 % (ref 10–15)
EXPTIME-PRE: 7.61 SEC
FEF2575-%PRED-PRE: 35 %
FEF2575-PRE: 0.87 L/SEC
FEF2575-PRED: 2.45 L/SEC
FEFMAX-%PRED-PRE: 46 %
FEFMAX-PRE: 3.01 L/SEC
FEFMAX-PRED: 6.49 L/SEC
FEV1-%PRED-PRE: 46 %
FEV1-PRE: 1.21 L
FEV1FEV6-PRE: 73 %
FEV1FEV6-PRED: 81 %
FEV1FVC-PRE: 73 %
FEV1FVC-PRED: 80 %
FIFMAX-PRE: 2.54 L/SEC
FVC-%PRED-PRE: 50 %
FVC-PRE: 1.66 L
FVC-PRED: 3.29 L
GFR SERPL CREATININE-BSD FRML MDRD: 27 ML/MIN/{1.73_M2}
GGT SERPL-CCNC: 510 U/L (ref 0–40)
GLUCOSE SERPL-MCNC: 110 MG/DL (ref 70–99)
HCT VFR BLD AUTO: 32.1 % (ref 35–47)
HGB BLD-MCNC: 9.5 G/DL (ref 11.7–15.7)
MCH RBC QN AUTO: 29.2 PG (ref 26.5–33)
MCHC RBC AUTO-ENTMCNC: 29.6 G/DL (ref 31.5–36.5)
MCV RBC AUTO: 99 FL (ref 78–100)
PLATELET # BLD AUTO: 73 10E9/L (ref 150–450)
POTASSIUM SERPL-SCNC: 3.2 MMOL/L (ref 3.4–5.3)
PROT SERPL-MCNC: 7.2 G/DL (ref 6.8–8.8)
RBC # BLD AUTO: 3.25 10E12/L (ref 3.8–5.2)
SODIUM SERPL-SCNC: 136 MMOL/L (ref 133–144)
TACROLIMUS BLD-MCNC: 6.4 UG/L (ref 5–15)
TME LAST DOSE: NORMAL H
WBC # BLD AUTO: 4.1 10E9/L (ref 4–11)

## 2019-11-11 PROCEDURE — 87799 DETECT AGENT NOS DNA QUANT: CPT | Performed by: PHYSICIAN ASSISTANT

## 2019-11-11 PROCEDURE — 82977 ASSAY OF GGT: CPT | Performed by: PHYSICIAN ASSISTANT

## 2019-11-11 PROCEDURE — 36415 COLL VENOUS BLD VENIPUNCTURE: CPT | Performed by: PHYSICIAN ASSISTANT

## 2019-11-11 PROCEDURE — 80053 COMPREHEN METABOLIC PANEL: CPT | Performed by: PHYSICIAN ASSISTANT

## 2019-11-11 PROCEDURE — G0463 HOSPITAL OUTPT CLINIC VISIT: HCPCS | Mod: ZF

## 2019-11-11 PROCEDURE — 80197 ASSAY OF TACROLIMUS: CPT | Performed by: PHYSICIAN ASSISTANT

## 2019-11-11 PROCEDURE — 85027 COMPLETE CBC AUTOMATED: CPT | Performed by: PHYSICIAN ASSISTANT

## 2019-11-11 RX ORDER — ONDANSETRON 4 MG/1
4 TABLET, FILM COATED ORAL EVERY 12 HOURS PRN
Qty: 60 TABLET | Refills: 0 | Status: SHIPPED | OUTPATIENT
Start: 2019-11-11 | End: 2019-12-03

## 2019-11-11 RX ORDER — CARVEDILOL 25 MG/1
25 TABLET ORAL 2 TIMES DAILY WITH MEALS
Qty: 60 TABLET | Refills: 0 | Status: SHIPPED | OUTPATIENT
Start: 2019-11-11 | End: 2019-12-18

## 2019-11-11 RX ORDER — VORICONAZOLE 50 MG/1
250 TABLET, FILM COATED ORAL EVERY 12 HOURS
Qty: 300 TABLET | Refills: 0 | Status: SHIPPED | OUTPATIENT
Start: 2019-11-11 | End: 2019-11-13

## 2019-11-11 ASSESSMENT — PAIN SCALES - GENERAL: PAINLEVEL: NO PAIN (0)

## 2019-11-11 NOTE — PATIENT INSTRUCTIONS
Patient Instructions  1. Try to make it to see ID this Wed. If not see ID in December along with Hepatology. We may need you to see the Hepatology team sooner. We will let you know based on your labs today.   2. Continue taking the voriconazole until you see ID.   3. We will let you know if we want you to see hepatology sooner.   4. Take Dapsone instead of the Bactrim  5. Continue to hold imuran for now.     Next transplant clinic appointment :  1 month with CXR, labs and PFTs  Next lab draw: weekly at dialysis.       AVS printed at time of check out       yes

## 2019-11-11 NOTE — LETTER
11/11/2019      RE: Kecia VILLAFANA Mirza  [id number] [place] [address]       Transplant Coordinator Note    Reason for visit: Post lung transplant follow up visit   Coordinator: Present   Caregiver:  None     Health concerns addressed today:  1. Pt reports that she is feeling better since discharging from the hospital.   2. Pt reports that she started to make urine.  She is getting dialysis on Tue, Thur, Sat.   3. Pt report some fatigue and SOB.   4. Pt reports diarrhea for the past 2 weeks. She reports this is improving.   5. Pt will continue taking Voriconazole for now, per Dr. Riley.         Activity/rehab: pt is able to do ADLs   Oxygen needs: none   Pain management/RX:   Diabetic management: NA  Next Bronch due:   High risk donor:   CMV status:  Valcyte stopped:   DVT/PE:  Post op AFIB/follow up with EP:  AC/asa:   PJP prophylactic:     Pt Education: medications (use/dose/side effects), how/when to call coordinator, frequency of labs, s/s of infection/rejection, call prior to starting any new medications, lab/vital sign book    Health Maintenance:     Last colonoscopy:     Next colonoscopy due:     Dermatology:    Vaccinations this visit:     Labs, CXR, PFTs reviewed with patient  Medication record reviewed and reconciled  Questions and concerns addressed    Patient Instructions  1. Try to make it to see ID this Wed. If not see ID in December along with Hepatology. We may need you to see the Hepatology team sooner. We will let you know based on your labs today.   2. Continue taking the voriconazole until you see ID.   3. We will let you know if we want you to see hepatology sooner.   4. Take Dapsone instead of the Bactrim  5. Continue to hold Imuran for now.       Next transplant clinic appointment :  1 month with CXR, labs and PFTs  Next lab draw: weekly at dialysis.       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  Her posttransplant course has been significantly complicated as described below.    1.  History of bilateral sequential lung transplant for ILD: The patient is overall doing well from respiratory standpoint.  She has had significant decline in her lung function which is likely mechanical due to large ascites.  Her chest imaging is unremarkable.  She does not have any significant respiratory symptoms.  She is currently on 2 drug immunosuppression including tacrolimus with a goal of 8 to 10 ng/mL and prednisone 7.5 mg daily.  Her azathioprine is currently being held for leukopenia.  Her white count is low normal today.  We will continue to hold Imuran at this point.  If her  white count remains consistently normal, will consider restarting it.  Her last PRA in August was negative.  We will recheck it during the next visit.  I anticipate that her lung function will improve once her ascites is relieved.    2.  Infectious disease: We will restart dapsone for PCP prophylaxis.  Her G6PD level is normal.  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  She will follow-up with transplant infectious disease at that time.  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  Her EBV level was recently noted to be minimally elevated, we will recheck it today.    3.  Acute renal failure: Likely related to acute calcineurin inhibitor toxicity.  The patient appears to be starting to make urine so anticipate possible renal recovery.  She continues to be on dialysis 3 times a week.  We will follow-up with nephrology.    4.  New onset ascites with portal hypertension: The patient  has persistent elevation of LFTs consistent with hepatocellular injury pattern.  Immunologic testing for causes of liver disease is unremarkable.  Patient is scheduled to follow-up with hepatology approximately 1 month from now.  We will reach out to them for any further work-up and sooner clinic follow-up given rising GGT.    5.  Hypertension: Blood pressure remains elevated but the patient has not taken her morning medications today.  She will continue amlodipine, carvedilol and Imdur.    Patient will return to clinic in approximately 1 month with repeat PFTs, imaging, labs.  She will follow-up with transplant infectious disease and hepatology in the interim.      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  Her posttransplant course was complicated by aspergillus empyema, stenotrophomonas airway infection, right mainstem bronchial stenosis, CMV reactivation, positive DSA, C. difficile infection.  Her comorbidities include hypertension and chronic kidney disease.           INTERVAL HISTORY:     The patient was recently admitted to the hospital for management of acute renal failure and is now on intermittent hemodialysis 3 times a week.  She she was also found to have ascites secondary to presumed congestive hepatopathy/cardiac cirrhosis.  She comes in today for her hospital discharge  follow-up.    She reports that from breathing standpoint she does not have any shortness of breath at rest or with mild exertion.  She does have dyspnea with moderate exertion.  The primary reason for limitation of her exertional capacity is fatigue.  She has a chronic cough productive of clear sputum.  She does not have any wheezing.  Her energy level is improving since discharge from the hospital.  She denies any hemoptysis.    She continues to report abdominal distention.  She has been completing her dialysis sessions without any difficulty.  She reports that her urine output appears to be increasing and she is starting to measure it now.         REVIEW OF SYSTEMS:     1.  She denies any recent fevers, chills, night sweats.  2.  She denies any chest pain, palpitations, lightheadedness.  3.  She denies any abdominal cramping.  She does not have GERD.  She has had a diarrhea for 2 weeks but this appears to be decreasing in frequency.  4.  She denies any new rashes or bruises.  5.  She denies any lower extremities edema.  6.  She denies headaches.  7.  She denies any visual changes.  8.  Her mood is good.  9.  She sleeps well at night.  Rest of the review of systems is negative except as noted above.       PULMONARY FUNCTION TESTS:     FEV1 1.21 L, 46% predicted.  FVC 1.66 L, 50% predicted.  FEV1/FVC 73%.  This test shows mixed obstructive and restrictive ventilatory defect however lung volumes are needed to confirm the diagnosis.  Both FEV1 and FVC are significantly lower compared to recent 50s.       CHEST IMAGING:     Bilaterally clear lung fields with likely right lower lobe atelectasis noted.  No significant effusions noted.       PHYSICAL EXAM:     Vitals:    11/11/19 1014   BP: (!) 155/95   BP Location: Right arm   Patient Position: Chair   Pulse: 84   Temp: 98.2  F (36.8  C)   TempSrc: Oral   SpO2: 100%   Weight: 58.8 kg (129 lb 11.2 oz)     General: Pleasant female, sitting comfortably, speaking in full  sentences without any discomfort.  HEENT: Moist mucous membranes, no signs of oral candidiasis.  Lymphatics: No cervical or submandibular lymphadenopathy.  Pulmonary: Normal intensity breath sounds bilaterally with no added sounds.  Cardiovascular: S1, S2 normal.  Regular rate and rhythm.  No murmurs, rubs or gallops noted.  Abdomen: Distended, soft, nontender.  Musculoskeletal: No lower extremities edema, no upper extremity tremors.  Neurological: Grossly normal.  Psychiatric: Normal affect.  Skin: No rashes on limited exam.         MEDICATIONS:     Current Outpatient Medications   Medication     calcium-vitamin D (CALTRATE) 600-400 MG-UNIT per tablet     carvedilol (COREG) 25 MG tablet     dapsone (ACZONE) 100 MG tablet     ferrous sulfate (FEROSUL) 325 (65 Fe) MG tablet     magnesium oxide (MAG-OX) 400 MG tablet     multivitamin, therapeutic with minerals (THERA-VIT-M) TABS tablet     ondansetron (ZOFRAN) 4 MG tablet     pantoprazole (PROTONIX) 40 MG EC tablet     predniSONE (DELTASONE) 2.5 MG tablet     senna-docusate (SENOKOT-S/PERICOLACE) 8.6-50 MG tablet     tacrolimus (GENERIC EQUIVALENT) 0.5 MG capsule     tacrolimus (GENERIC EQUIVALENT) 1 MG capsule     voriconazole (VFEND) 200 MG tablet     voriconazole (VFEND) 50 MG tablet     No current facility-administered medications for this visit.               PAST MEDICAL HISTORY:     Past Medical History:   Diagnosis Date     Antisynthetase syndrome (H) 2014     Chronic cough      Chronic infection     recent C diff  8/18     Dehydration 8/1/2018     Dermatomyositis (H)      Dysplasia of cervix, low grade (ESTRADA 1)      ILD (interstitial lung disease) (H)      Osteopenia      PONV (postoperative nausea and vomiting)      Pulmonary hypertension (H)      Raynaud's disease      Seronegative rheumatoid arthritis (H)             PAST SURGICAL HISTORY:     Past Surgical History:   Procedure Laterality Date     BRONCHOSCOPY (RIGID OR FLEXIBLE), DIAGNOSTIC N/A 4/10/2018     Procedure: COMBINED BRONCHOSCOPY (RIGID OR FLEXIBLE), LAVAGE;;  Surgeon: Mariposa Donohue MD;  Location: [facility]     BRONCHOSCOPY (RIGID OR FLEXIBLE), DILATE BRONCHUS / TRACHEA N/A 10/11/2018    Procedure: BRONCHOSCOPY (RIGID OR FLEXIBLE), DILATE BRONCHUS / TRACHEA;  Flexible And Rigid Bronchoscopy and Dilation;  Surgeon: Wilber Lin MD;  Location: [facility]     BRONCHOSCOPY FLEXIBLE N/A 3/13/2018    Procedure: BRONCHOSCOPY FLEXIBLE;  Flexible Bronchoscopy ;  Surgeon: Gissell Sanchez MD;  Location: [facility]     BRONCHOSCOPY FLEXIBLE N/A 5/9/2018    Procedure: BRONCHOSCOPY FLEXIBLE;;  Surgeon: Wilber Lin MD;  Location: [facility]     BRONCHOSCOPY FLEXIBLE AND RIGID N/A 9/10/2018    Procedure: BRONCHOSCOPY FLEXIBLE AND RIGID;  Flexible and Rigid Bronchoscopy with Balloon Dilation, tissue debulking with cryo, and Right mainstem bronchus stent placement;  Surgeon: Wilber Lin MD;  Location: [facility]     BRONCHOSCOPY RIGID N/A 6/6/2018    Procedure: BRONCHOSCOPY RIGID;;  Surgeon: Lopez Macias MD;  Location: [facility]     BRONCHOSCOPY, DILATE BRONCHUS, STENT BRONCHUS, COMBINED N/A 6/11/2018    Procedure: COMBINED BRONCHOSCOPY, DILATE BRONCHUS, STENT BRONCHUS;  Flexible Bronchoscopy, Balloon Dilation, Bronchial Washings;  Surgeon: Wilber Lin MD;  [facility]     BRONCHOSCOPY, DILATE BRONCHUS, STENT BRONCHUS, COMBINED Right 7/10/2018    Procedure: COMBINED BRONCHOSCOPY, DILATE BRONCHUS, STENT BRONCHUS;  Flexible Bronchoscopy, Balloon Dilation, Bronchial Washings  ;  Surgeon: Wilber Lin MD;  [facility]: [facility]     BRONCHOSCOPY, DILATE BRONCHUS, STENT BRONCHUS, COMBINED N/A 8/2/2018    Procedure: COMBINED BRONCHOSCOPY, DILATE BRONCHUS, STENT BRONCHUS;  Flexible Bronchoscopy, Bronchial Washings, Balloon Dilation;  Surgeon: Wilber Lin MD;  [facility]     BRONCHOSCOPY, DILATE BRONCHUS, STENT BRONCHUS, COMBINED N/A 8/20/2018    Procedure: COMBINED  BRONCHOSCOPY, DILATE BRONCHUS, STENT BRONCHUS;  Flexible Bronchoscopy, Balloon Dilation;  Surgeon: Wilber Lin MD;  [facility]     BRONCHOSCOPY, DILATE BRONCHUS, STENT BRONCHUS, COMBINED N/A 10/29/2018    Procedure: Flexible Bronchoscopy, Balloon Dilation, Stent Revision, Airway Examination And Therapeutic Suctioning, Cyro Tumor Debulking;  Surgeon: Wilber Lin MD;  [facility]     BRONCHOSCOPY, DILATE BRONCHUS, STENT BRONCHUS, COMBINED N/A 11/20/2018    Procedure: Rigid Bronchoscopy, Stent Removal and dilitation;  Surgeon: Wilber Lin MD;  [facility]     BRONCHOSCOPY, DILATE BRONCHUS, STENT BRONCHUS, COMBINED N/A 12/14/2018    Procedure: Flexible And Rigid Bronchoscopy, Balloon Dilation, Bronchial Washing;  Surgeon: Wilber Lin MD;  [facility]     BRONCHOSCOPY, DILATE BRONCHUS, STENT BRONCHUS, COMBINED N/A 1/17/2019    Procedure: Flexible And Rigid Bronchoscopy, Balloon Dilation.;  Surgeon: Wilber Lni MD;  [facility]     BRONCHOSCOPY, DILATE BRONCHUS, STENT BRONCHUS, COMBINED N/A 3/7/2019    Procedure: Flexible and Rigid Bronchoscopy, Bronchial Washing, Balloon Dilation;  Surgeon: Wilber Lin MD;  [facility]     BRONCHOSCOPY, DILATE BRONCHUS, STENT BRONCHUS, COMBINED N/A 6/6/2019    Procedure: Rigid and Flexible Bronchoscopy, Balloon Dilation;  Surgeon: Wilber Lin MD;  [facility]     BRONCHOSCOPY, DILATE BRONCHUS, STENT BRONCHUS, COMBINED N/A 10/11/2019    Procedure: Flexible and Rigid Bronchoscopy, Balloon Dilation, Bronchoalveolar Lagave;  Surgeon: Wilber Lin MD;  Location: [facility]     ENT SURGERY      tonsillectomy as a child     ESOPHAGOSCOPY, GASTROSCOPY, DUODENOSCOPY (EGD), COMBINED N/A 10/29/2018    Procedure: COMBINED ESOPHAGOSCOPY, GASTROSCOPY, DUODENOSCOPY (EGD) with biopsies and polypectomy;  Surgeon: Chente Bloom MD;  [facility]     INSERT EXTRACORPORAL MEMBRANE  OXYGENATOR ADULT (OUTSIDE OR) N/A 2/27/2018    Procedure: INSERT EXTRACORPORAL MEMBRANE OXYGENATOR ADULT (OUTSIDE OR);  INSERT EXTRACORPORAL MEMBRANE OXYGENATOR ADULT (OUTSIDE OR) ;  Surgeon: Toby Hernandez MD;  Location: [facility]     IR CVC TUNNEL PLACEMENT > 5 YRS OF AGE  10/25/2019     IR THORACENTESIS  9/13/2019     no prior surgery       REMOVE EXTRACORPORAL MEMBRANE OXYGENATOR ADULT N/A 3/3/2018    Procedure: REMOVE EXTRACORPORAL MEMBRANE OXYGENATOR ADULT;  Removal of Right Femoral Venous and Right Axillary Arterial Extracorporeal Membrane Oxygenator;  Surgeon: Toby Hernandez MD;  [facility]: [facility]     TRANSPLANT LUNG RECIPIENT SINGLE X2 Bilateral 3/1/2018    Procedure: TRANSPLANT LUNG RECIPIENT SINGLE X2;  Median Sternotomy, Extracorporeal Membrane Oxygenator, bilateral sequential lung transplant;  Surgeon: Toby Hernandez MD;  Location[facility]            FAMILY HISTORY:     Family History   Problem Relation Age of Onset     Hypertension Mother      Arthritis Mother      Pancreatic Cancer Father      Diabetes Father             ALLERGIES:     No Known Allergies         Data:   All laboratory and imaging data reviewed.    Results for orders placed or performed in visit on 11/11/19   Tacrolimus level     Status: Abnormal   Result Value Ref Range    Tacrolimus Last Dose [id number],972419     Tacrolimus Level 6.4 5.0 - 15.0 ug/L   CBC with platelets     Status: Abnormal   Result Value Ref Range    WBC 4.1 4.0 - 11.0 10e9/L    RBC Count 3.25 (L) 3.8 - 5.2 10e12/L    Hemoglobin 9.5 (L) 11.7 - 15.7 g/dL    Hematocrit 32.1 (L) 35.0 - 47.0 %    MCV 99 78 - 100 fl    MCH 29.2 26.5 - 33.0 pg    MCHC 29.6 (L) 31.5 - 36.5 g/dL    RDW 18.3 (H) 10.0 - 15.0 %    Platelet Count 73 (L) 150 - 450 10e9/L   Comprehensive metabolic panel     Status: Abnormal   Result Value Ref Range    Sodium 136 133 - 144 mmol/L    Potassium 3.2 (L) 3.4 - 5.3 mmol/L    Chloride 100 94 - 109 mmol/L    Carbon Dioxide 31 20 -  32 mmol/L    Anion Gap 6 3 - 14 mmol/L    Glucose 110 (H) 70 - 99 mg/dL    Urea Nitrogen 22 7 - 30 mg/dL    Creatinine 2.02 (H) 0.52 - 1.04 mg/dL    GFR Estimate 27 (L) >60 mL/min/[1.73_m2]    GFR Estimate If Black 31 (L) >60 mL/min/[1.73_m2]    Calcium 8.7 8.5 - 10.1 mg/dL    Bilirubin Total 0.4 0.2 - 1.3 mg/dL    Albumin 3.0 (L) 3.4 - 5.0 g/dL    Protein Total 7.2 6.8 - 8.8 g/dL    Alkaline Phosphatase 316 (H) 40 - 150 U/L    ALT 27 0 - 50 U/L    AST 30 0 - 45 U/L   GGT     Status: Abnormal   Result Value Ref Range     (H) 0 - 40 U/L   CMV DNA quantification     Status: Abnormal   Result Value Ref Range    CMV DNA Quantitation Specimen EDTA PLASMA     CMV Quant IU/mL <137 (A) CMVND^CMV DNA Not Detected [IU]/mL    Log IU/mL of CMVQNT <2.1 <2.1 [Log_IU]/mL   EBV DNA PCR Quantitative Whole Blood     Status: Abnormal   Result Value Ref Range    EBV DNA Copies/mL 9,669 (A) EBVNEG^EBV DNA Not Detected [Copies]/mL    EBV DNA Log of Copies 4.0 (H) <2.7 [Log_copies]/mL   Results for orders placed or performed in visit on 11/11/19   General PFT Lab (Please always keep checked)     Status: None (Preliminary result)   Result Value Ref Range    FVC-Pred 3.29 L    FVC-Pre 1.66 L    FVC-%Pred-Pre 50 %    FEV1-Pre 1.21 L    FEV1-%Pred-Pre 46 %    FEV1FVC-Pred 80 %    FEV1FVC-Pre 73 %    FEFMax-Pred 6.49 L/sec    FEFMax-Pre 3.01 L/sec    FEFMax-%Pred-Pre 46 %    FEF2575-Pred 2.45 L/sec    FEF2575-Pre 0.87 L/sec    ZGU0423-%Pred-Pre 35 %    ExpTime-Pre 7.61 sec    FIFMax-Pre 2.54 L/sec    FEV1FEV6-Pred 81 %    FEV1FEV6-Pre 73 %       The above note was dictated using voice recognition software and may include typographical errors. Please contact the author for any clarifications.    Dean Riley MD   of Medicine  Pulmonary, Critical Care and Sleep Medicine  [facility]  Pager: [phone]        Dean Riley MD

## 2019-11-11 NOTE — PROGRESS NOTES
Transplant Coordinator Note    Reason for visit: Post lung transplant follow up visit   Coordinator: Present   Caregiver:  None     Health concerns addressed today:  1. Pt reports that she is feeling better since discharging from the hospital.   2. Pt reports that she started to make urine.  She is getting dialysis on Tue, Thur, Sat.   3. Pt report some fatigue and SOB.   4. Pt reports diarrhea for the past 2 weeks. She reports this is improving.   5. Pt will continue taking Voriconazole for now, per Dr. Riley.         Activity/rehab: pt is able to do ADLs   Oxygen needs: none   Pain management/RX:   Diabetic management: NA  Next Bronch due:   High risk donor:   CMV status:  Valcyte stopped:   DVT/PE:  Post op AFIB/follow up with EP:  AC/asa:   PJP prophylactic:     Pt Education: medications (use/dose/side effects), how/when to call coordinator, frequency of labs, s/s of infection/rejection, call prior to starting any new medications, lab/vital sign book    Health Maintenance:     Last colonoscopy:     Next colonoscopy due:     Dermatology:    Vaccinations this visit:     Labs, CXR, PFTs reviewed with patient  Medication record reviewed and reconciled  Questions and concerns addressed    Patient Instructions  1. Try to make it to see ID this Wed. If not see ID in December along with Hepatology. We may need you to see the Hepatology team sooner. We will let you know based on your labs today.   2. Continue taking the voriconazole until you see ID.   3. We will let you know if we want you to see hepatology sooner.   4. Take Dapsone instead of the Bactrim  5. Continue to hold Imuran for now.       Next transplant clinic appointment :  1 month with CXR, labs and PFTs  Next lab draw: weekly at dialysis.       AVS printed at time of check out

## 2019-11-11 NOTE — NURSING NOTE
Chief Complaint   Patient presents with     RECHECK     Blood pressure (!) 155/95, pulse 84, temperature 98.2  F (36.8  C), temperature source Oral, weight 58.8 kg (129 lb 11.2 oz), last menstrual period 06/07/2014, SpO2 100 %.    Scar Rojas/CARLTON  November 11, 2019 10:16 AM

## 2019-11-11 NOTE — LETTER
11/11/2019     RE: Kecia Blue  [address] [place] [address]     Dear Colleague,    Thank you for referring your patient, Kecia Blue, to the [facility] SOLID ORGAN TRANSPLANT at [facility]. Please see a copy of my visit note below.    Transplant Coordinator Note    Reason for visit: Post lung transplant follow up visit   Coordinator: Present   Caregiver:  None     Health concerns addressed today:  1. Pt reports that she is feeling better since discharging from the hospital.   2. Pt reports that she started to make urine.  She is getting dialysis on Tue, Thur, Sat.   3. Pt report some fatigue and SOB.   4. Pt reports diarrhea for the past 2 weeks. She reports this is improving.   5. Pt will continue taking Voriconazole for now, per Dr. Riley.         Activity/rehab: pt is able to do ADLs   Oxygen needs: none   Pain management/RX:   Diabetic management: NA  Next Bronch due:   High risk donor:   CMV status:  Valcyte stopped:   DVT/PE:  Post op AFIB/follow up with EP:  AC/asa:   PJP prophylactic:     Pt Education: medications (use/dose/side effects), how/when to call coordinator, frequency of labs, s/s of infection/rejection, call prior to starting any new medications, lab/vital sign book    Health Maintenance:     Last colonoscopy:     Next colonoscopy due:     Dermatology:    Vaccinations this visit:     Labs, CXR, PFTs reviewed with patient  Medication record reviewed and reconciled  Questions and concerns addressed    Patient Instructions  1. Try to make it to see ID this Wed. If not see ID in December along with Hepatology. We may need you to see the Hepatology team sooner. We will let you know based on your labs today.   2. Continue taking the voriconazole until you see ID.   3. We will let you know if we want you to see hepatology sooner.   4. Take Dapsone instead of the Bactrim  5. Continue to hold Imuran for now.       Next transplant clinic  appointment :  1 month with CXR, labs and PFTs  Next lab draw: weekly at dialysis.       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  Her posttransplant course has been significantly complicated as described below.    1.  History of bilateral sequential lung transplant for ILD: The patient is overall doing well from respiratory standpoint.  She has had significant decline in her lung function which is likely mechanical due to large ascites.  Her chest imaging is unremarkable.  She does not have any significant respiratory symptoms.  She is currently on 2 drug immunosuppression including tacrolimus with a goal of 8 to 10 ng/mL and prednisone 7.5 mg daily.  Her azathioprine is currently being held for leukopenia.  Her white count is low normal today.  We will continue to hold Imuran at this point.  If her  white count remains consistently normal, will consider restarting it.  Her last PRA in August was negative.  We will recheck it during the next visit.  I anticipate that her lung function will improve once her ascites is relieved.    2.  Infectious disease: We will restart dapsone for PCP prophylaxis.  Her G6PD level is normal.  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  She will follow-up with transplant infectious disease at that time.  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  Her EBV level was recently noted to be minimally elevated, we will recheck it today.    3.  Acute renal failure: Likely related to acute calcineurin inhibitor toxicity.  The patient appears to be starting  to make urine so anticipate possible renal recovery.  She continues to be on dialysis 3 times a week.  We will follow-up with nephrology.    4.  New onset ascites with portal hypertension: The patient has persistent elevation of LFTs consistent with hepatocellular injury pattern.  Immunologic testing for causes of liver disease is unremarkable.  Patient is scheduled to follow-up with hepatology approximately 1 month from now.  We will reach out to them for any further work-up and sooner clinic follow-up given rising GGT.    5.  Hypertension: Blood pressure remains elevated but the patient has not taken her morning medications today.  She will continue amlodipine, carvedilol and Imdur.    Patient will return to clinic in approximately 1 month with repeat PFTs, imaging, labs.  She will follow-up with transplant infectious disease and hepatology in the interim.      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  Her posttransplant course was complicated by aspergillus empyema, stenotrophomonas airway infection, right mainstem bronchial stenosis, CMV reactivation, positive DSA, C. difficile infection.  Her comorbidities include hypertension and chronic kidney disease.           INTERVAL HISTORY:     The patient was recently admitted to the hospital for management of acute renal failure and is now on  intermittent hemodialysis 3 times a week.  She she was also found to have ascites secondary to presumed congestive hepatopathy/cardiac cirrhosis.  She comes in today for her hospital discharge follow-up.    She reports that from breathing standpoint she does not have any shortness of breath at rest or with mild exertion.  She does have dyspnea with moderate exertion.  The primary reason for limitation of her exertional capacity is fatigue.  She has a chronic cough productive of clear sputum.  She does not have any wheezing.  Her energy level is improving since discharge from the hospital.  She denies any hemoptysis.    She continues to report abdominal distention.  She has been completing her dialysis sessions without any difficulty.  She reports that her urine output appears to be increasing and she is starting to measure it now.         REVIEW OF SYSTEMS:     1.  She denies any recent fevers, chills, night sweats.  2.  She denies any chest pain, palpitations, lightheadedness.  3.  She denies any abdominal cramping.  She does not have GERD.  She has had a diarrhea for 2 weeks but this appears to be decreasing in frequency.  4.  She denies any new rashes or bruises.  5.  She denies any lower extremities edema.  6.  She denies headaches.  7.  She denies any visual changes.  8.  Her mood is good.  9.  She sleeps well at night.  Rest of the review of systems is negative except as noted above.       PULMONARY FUNCTION TESTS:     FEV1 1.21 L, 46% predicted.  FVC 1.66 L, 50% predicted.  FEV1/FVC 73%.  This test shows mixed obstructive and restrictive ventilatory defect however lung volumes are needed to confirm the diagnosis.  Both FEV1 and FVC are significantly lower compared to recent 50s.       CHEST IMAGING:     Bilaterally clear lung fields with likely right lower lobe atelectasis noted.  No significant effusions noted.       PHYSICAL EXAM:     Vitals:    11/11/19 1014   BP: (!) 155/95   BP Location: Right arm    Patient Position: Chair   Pulse: 84   Temp: 98.2  F (36.8  C)   TempSrc: Oral   SpO2: 100%   Weight: 58.8 kg (129 lb 11.2 oz)     General: Pleasant female, sitting comfortably, speaking in full sentences without any discomfort.  HEENT: Moist mucous membranes, no signs of oral candidiasis.  Lymphatics: No cervical or submandibular lymphadenopathy.  Pulmonary: Normal intensity breath sounds bilaterally with no added sounds.  Cardiovascular: S1, S2 normal.  Regular rate and rhythm.  No murmurs, rubs or gallops noted.  Abdomen: Distended, soft, nontender.  Musculoskeletal: No lower extremities edema, no upper extremity tremors.  Neurological: Grossly normal.  Psychiatric: Normal affect.  Skin: No rashes on limited exam.         MEDICATIONS:     Current Outpatient Medications   Medication     calcium-vitamin D (CALTRATE) 600-400 MG-UNIT per tablet     carvedilol (COREG) 25 MG tablet     dapsone (ACZONE) 100 MG tablet     ferrous sulfate (FEROSUL) 325 (65 Fe) MG tablet     magnesium oxide (MAG-OX) 400 MG tablet     multivitamin, therapeutic with minerals (THERA-VIT-M) TABS tablet     ondansetron (ZOFRAN) 4 MG tablet     pantoprazole (PROTONIX) 40 MG EC tablet     predniSONE (DELTASONE) 2.5 MG tablet     senna-docusate (SENOKOT-S/PERICOLACE) 8.6-50 MG tablet     tacrolimus (GENERIC EQUIVALENT) 0.5 MG capsule     tacrolimus (GENERIC EQUIVALENT) 1 MG capsule     voriconazole (VFEND) 200 MG tablet     voriconazole (VFEND) 50 MG tablet     No current facility-administered medications for this visit.               PAST MEDICAL HISTORY:     Past Medical History:   Diagnosis Date     Antisynthetase syndrome (H) 2014     Chronic cough      Chronic infection     recent C diff  8/18     Dehydration 8/1/2018     Dermatomyositis (H)      Dysplasia of cervix, low grade (ESTRADA 1)      ILD (interstitial lung disease) (H)      Osteopenia      PONV (postoperative nausea and vomiting)      Pulmonary hypertension (H)      Raynaud's disease       Seronegative rheumatoid arthritis (H)             PAST SURGICAL HISTORY:     Past Surgical History:   Procedure Laterality Date     BRONCHOSCOPY (RIGID OR FLEXIBLE), DIAGNOSTIC N/A 4/10/2018    Procedure: COMBINED BRONCHOSCOPY (RIGID OR FLEXIBLE), LAVAGE;;  Surgeon: Mariposa Donohue MD;  Location: [facility]     BRONCHOSCOPY (RIGID OR FLEXIBLE), DILATE BRONCHUS / TRACHEA N/A 10/11/2018    Procedure: BRONCHOSCOPY (RIGID OR FLEXIBLE), DILATE BRONCHUS / TRACHEA;  Flexible And Rigid Bronchoscopy and Dilation;  Surgeon: Wilber Lin MD;  Location: [facility]     BRONCHOSCOPY FLEXIBLE N/A 3/13/2018    Procedure: BRONCHOSCOPY FLEXIBLE;  Flexible Bronchoscopy ;  Surgeon: Gissell Sanchez MD;  Location: [facility]     BRONCHOSCOPY FLEXIBLE N/A 5/9/2018    Procedure: BRONCHOSCOPY FLEXIBLE;;  Surgeon: Wilber Lin MD;  Location: [facility]     BRONCHOSCOPY FLEXIBLE AND RIGID N/A 9/10/2018    Procedure: BRONCHOSCOPY FLEXIBLE AND RIGID;  Flexible and Rigid Bronchoscopy with Balloon Dilation, tissue debulking with cryo, and Right mainstem bronchus stent placement;  Surgeon: Wilber Lin MD;  Location: [facility]     BRONCHOSCOPY RIGID N/A 6/6/2018    Procedure: BRONCHOSCOPY RIGID;;  Surgeon: Lopez Macias MD;  Location: [facility]     BRONCHOSCOPY, DILATE BRONCHUS, STENT BRONCHUS, COMBINED N/A 6/11/2018    Procedure: COMBINED BRONCHOSCOPY, DILATE BRONCHUS, STENT BRONCHUS;  Flexible Bronchoscopy, Balloon Dilation, Bronchial Washings;  Surgeon: Wilber Lin MD;  Location: [facility]     BRONCHOSCOPY, DILATE BRONCHUS, STENT BRONCHUS, COMBINED Right 7/10/2018    Procedure: COMBINED BRONCHOSCOPY, DILATE BRONCHUS, STENT BRONCHUS;  Flexible Bronchoscopy, Balloon Dilation, Bronchial Washings  ;  Surgeon: Wilber Lin MD;  Location: [facility]     BRONCHOSCOPY, DILATE BRONCHUS, STENT BRONCHUS, COMBINED N/A 8/2/2018    Procedure: COMBINED BRONCHOSCOPY, DILATE BRONCHUS, STENT BRONCHUS;  Flexible Bronchoscopy,  Bronchial Washings, Balloon Dilation;  Surgeon: Wilber Lin MD;  [facility]     BRONCHOSCOPY, DILATE BRONCHUS, STENT BRONCHUS, COMBINED N/A 8/20/2018    Procedure: COMBINED BRONCHOSCOPY, DILATE BRONCHUS, STENT BRONCHUS;  Flexible Bronchoscopy, Balloon Dilation;  Surgeon: Wilber Lin MD;  [facility]: [facility]     BRONCHOSCOPY, DILATE BRONCHUS, STENT BRONCHUS, COMBINED N/A 10/29/2018    Procedure: Flexible Bronchoscopy, Balloon Dilation, Stent Revision, Airway Examination And Therapeutic Suctioning, Cyro Tumor Debulking;  Surgeon: Wilber Lin MD;  Location: [facility]     BRONCHOSCOPY, DILATE BRONCHUS, STENT BRONCHUS, COMBINED N/A 11/20/2018    Procedure: Rigid Bronchoscopy, Stent Removal and dilitation;  Surgeon: Wilber Lin MD;  [facility]: [facility]     BRONCHOSCOPY, DILATE BRONCHUS, STENT BRONCHUS, COMBINED N/A 12/14/2018    Procedure: Flexible And Rigid Bronchoscopy, Balloon Dilation, Bronchial Washing;  Surgeon: Wilber Lin MD;  Location: [facility]     BRONCHOSCOPY, DILATE BRONCHUS, STENT BRONCHUS, COMBINED N/A 1/17/2019    Procedure: Flexible And Rigid Bronchoscopy, Balloon Dilation.;  Surgeon: Wilber Lin MD;  [facility]: [facility]     BRONCHOSCOPY, DILATE BRONCHUS, STENT BRONCHUS, COMBINED N/A 3/7/2019    Procedure: Flexible and Rigid Bronchoscopy, Bronchial Washing, Balloon Dilation;  Surgeon: Wilber Lin MD;  [facility]: [facility]     BRONCHOSCOPY, DILATE BRONCHUS, STENT BRONCHUS, COMBINED N/A 6/6/2019    Procedure: Rigid and Flexible Bronchoscopy, Balloon Dilation;  Surgeon: Wilber Lin MD;  [facility]     BRONCHOSCOPY, DILATE BRONCHUS, STENT BRONCHUS, COMBINED N/A 10/11/2019    Procedure: Flexible and Rigid Bronchoscopy, Balloon Dilation, Bronchoalveolar Lagave;  Surgeon: Wilber Lin MD;  Location: [facility]     ENT SURGERY      tonsillectomy as a child     ESOPHAGOSCOPY, GASTROSCOPY, DUODENOSCOPY (EGD), COMBINED N/A 10/29/2018     Procedure: COMBINED ESOPHAGOSCOPY, GASTROSCOPY, DUODENOSCOPY (EGD) with biopsies and polypectomy;  Surgeon: Chente Bloom MD;  Location[facility]     INSERT EXTRACORPORAL MEMBRANE OXYGENATOR ADULT (OUTSIDE OR) N/A 2/27/2018    Procedure: INSERT EXTRACORPORAL MEMBRANE OXYGENATOR ADULT (OUTSIDE OR);  INSERT EXTRACORPORAL MEMBRANE OXYGENATOR ADULT (OUTSIDE OR) ;  Surgeon: Toby Hernandez MD;  Location: [facility]     IR CVC TUNNEL PLACEMENT > 5 YRS OF AGE  10/25/2019     IR THORACENTESIS  9/13/2019     no prior surgery       REMOVE EXTRACORPORAL MEMBRANE OXYGENATOR ADULT N/A 3/3/2018    Procedure: REMOVE EXTRACORPORAL MEMBRANE OXYGENATOR ADULT;  Removal of Right Femoral Venous and Right Axillary Arterial Extracorporeal Membrane Oxygenator;  Surgeon: Toby Hernandez MD;  Location: [facility]     TRANSPLANT LUNG RECIPIENT SINGLE X2 Bilateral 3/1/2018    Procedure: TRANSPLANT LUNG RECIPIENT SINGLE X2;  Median Sternotomy, Extracorporeal Membrane Oxygenator, bilateral sequential lung transplant;  Surgeon: Toby Hernandez MD;  Location: [facility]            FAMILY HISTORY:     Family History   Problem Relation Age of Onset     Hypertension Mother      Arthritis Mother      Pancreatic Cancer Father      Diabetes Father             ALLERGIES:     No Known Allergies         Data:   All laboratory and imaging data reviewed.    Results for orders placed or performed in visit on 11/11/19   Tacrolimus level     Status: Abnormal   Result Value Ref Range    Tacrolimus Last Dose [id number],867896     Tacrolimus Level 6.4 5.0 - 15.0 ug/L   CBC with platelets     Status: Abnormal   Result Value Ref Range    WBC 4.1 4.0 - 11.0 10e9/L    RBC Count 3.25 (L) 3.8 - 5.2 10e12/L    Hemoglobin 9.5 (L) 11.7 - 15.7 g/dL    Hematocrit 32.1 (L) 35.0 - 47.0 %    MCV 99 78 - 100 fl    MCH 29.2 26.5 - 33.0 pg    MCHC 29.6 (L) 31.5 - 36.5 g/dL    RDW 18.3 (H) 10.0 - 15.0 %    Platelet Count 73 (L) 150 - 450 10e9/L   Comprehensive  metabolic panel     Status: Abnormal   Result Value Ref Range    Sodium 136 133 - 144 mmol/L    Potassium 3.2 (L) 3.4 - 5.3 mmol/L    Chloride 100 94 - 109 mmol/L    Carbon Dioxide 31 20 - 32 mmol/L    Anion Gap 6 3 - 14 mmol/L    Glucose 110 (H) 70 - 99 mg/dL    Urea Nitrogen 22 7 - 30 mg/dL    Creatinine 2.02 (H) 0.52 - 1.04 mg/dL    GFR Estimate 27 (L) >60 mL/min/[1.73_m2]    GFR Estimate If Black 31 (L) >60 mL/min/[1.73_m2]    Calcium 8.7 8.5 - 10.1 mg/dL    Bilirubin Total 0.4 0.2 - 1.3 mg/dL    Albumin 3.0 (L) 3.4 - 5.0 g/dL    Protein Total 7.2 6.8 - 8.8 g/dL    Alkaline Phosphatase 316 (H) 40 - 150 U/L    ALT 27 0 - 50 U/L    AST 30 0 - 45 U/L   GGT     Status: Abnormal   Result Value Ref Range     (H) 0 - 40 U/L   CMV DNA quantification     Status: Abnormal   Result Value Ref Range    CMV DNA Quantitation Specimen EDTA PLASMA     CMV Quant IU/mL <137 (A) CMVND^CMV DNA Not Detected [IU]/mL    Log IU/mL of CMVQNT <2.1 <2.1 [Log_IU]/mL   EBV DNA PCR Quantitative Whole Blood     Status: Abnormal   Result Value Ref Range    EBV DNA Copies/mL 9,669 (A) EBVNEG^EBV DNA Not Detected [Copies]/mL    EBV DNA Log of Copies 4.0 (H) <2.7 [Log_copies]/mL   Results for orders placed or performed in visit on 11/11/19   General PFT Lab (Please always keep checked)     Status: None (Preliminary result)   Result Value Ref Range    FVC-Pred 3.29 L    FVC-Pre 1.66 L    FVC-%Pred-Pre 50 %    FEV1-Pre 1.21 L    FEV1-%Pred-Pre 46 %    FEV1FVC-Pred 80 %    FEV1FVC-Pre 73 %    FEFMax-Pred 6.49 L/sec    FEFMax-Pre 3.01 L/sec    FEFMax-%Pred-Pre 46 %    FEF2575-Pred 2.45 L/sec    FEF2575-Pre 0.87 L/sec    BJX7060-%Pred-Pre 35 %    ExpTime-Pre 7.61 sec    FIFMax-Pre 2.54 L/sec    FEV1FEV6-Pred 81 %    FEV1FEV6-Pre 73 %     The above note was dictated using voice recognition software and may include typographical errors. Please contact the author for any clarifications.    Dean Riley MD   of  Medicine  Pulmonary, Critical Care and Sleep Medicine  [facility]  Pager: [phone]

## 2019-11-12 ENCOUNTER — TELEPHONE (OUTPATIENT)
Dept: TRANSPLANT | Facility: CLINIC | Age: 59
End: 2019-11-12

## 2019-11-12 DIAGNOSIS — Z94.2 S/P LUNG TRANSPLANT (H): ICD-10-CM

## 2019-11-12 LAB
CMV DNA SPEC NAA+PROBE-ACNC: <137 [IU]/ML
CMV DNA SPEC NAA+PROBE-LOG#: <2.1 {LOG_IU}/ML
EBV DNA # SPEC NAA+PROBE: 9669 {COPIES}/ML
EBV DNA SPEC NAA+PROBE-LOG#: 4 {LOG_COPIES}/ML
SPECIMEN SOURCE: ABNORMAL

## 2019-11-12 RX ORDER — TACROLIMUS 0.5 MG/1
0.5 CAPSULE ORAL EVERY MORNING
Qty: 30 CAPSULE | Refills: 11 | Status: SHIPPED | OUTPATIENT
Start: 2019-11-12 | End: 2019-12-05

## 2019-11-12 RX ORDER — TACROLIMUS 1 MG/1
1 CAPSULE ORAL EVERY EVENING
Qty: 30 CAPSULE | Refills: 11 | Status: SHIPPED | OUTPATIENT
Start: 2019-11-12 | End: 2019-12-05

## 2019-11-12 NOTE — TELEPHONE ENCOUNTER
Tacrolimus level 6.4 at 13 hours, on 11/11/19  Goal 6-8.   Current dose 0.5 mg in AM, 0.5 mg in PM    Dose changed to 0.5 mg in AM, 1 mg in PM   Recheck level in 7 days    Discussed with patient. LFTs and GGT still elevated. Awaiting to see if patient should see hepatology sooner than December, patient aware she may need to do this.

## 2019-11-13 ENCOUNTER — MYC REFILL (OUTPATIENT)
Dept: TRANSPLANT | Facility: CLINIC | Age: 59
End: 2019-11-13

## 2019-11-13 DIAGNOSIS — J86.9 EMPYEMA OF LUNG (H): ICD-10-CM

## 2019-11-13 DIAGNOSIS — B44.9 ASPERGILLUS (H): ICD-10-CM

## 2019-11-13 DIAGNOSIS — Z94.2 S/P LUNG TRANSPLANT (H): ICD-10-CM

## 2019-11-14 DIAGNOSIS — Z94.2 S/P LUNG TRANSPLANT (H): Primary | ICD-10-CM

## 2019-11-15 RX ORDER — VORICONAZOLE 50 MG/1
250 TABLET, FILM COATED ORAL EVERY 12 HOURS
Qty: 300 TABLET | Refills: 0 | Status: SHIPPED | OUTPATIENT
Start: 2019-11-15 | End: 2019-11-18

## 2019-11-18 ENCOUNTER — TELEPHONE (OUTPATIENT)
Dept: TRANSPLANT | Facility: CLINIC | Age: 59
End: 2019-11-18

## 2019-11-18 DIAGNOSIS — Z94.2 LUNG REPLACED BY TRANSPLANT (H): Primary | ICD-10-CM

## 2019-11-18 RX ORDER — VORICONAZOLE 50 MG/1
50 TABLET, FILM COATED ORAL 2 TIMES DAILY
Qty: 60 TABLET | Refills: 3 | Status: SHIPPED | OUTPATIENT
Start: 2019-11-18 | End: 2020-03-25

## 2019-11-18 RX ORDER — VORICONAZOLE 200 MG/1
200 TABLET, FILM COATED ORAL 2 TIMES DAILY
Qty: 60 TABLET | Refills: 3 | Status: SHIPPED | OUTPATIENT
Start: 2019-11-18 | End: 2020-03-25

## 2019-11-18 NOTE — PROGRESS NOTES
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  Her posttransplant course has been significantly complicated as described below.    1.  History of bilateral sequential lung transplant for ILD: The patient is overall doing well from respiratory standpoint.  She has had significant decline in her lung function which is likely mechanical due to large ascites.  Her chest imaging is unremarkable.  She does not have any significant respiratory symptoms.  She is currently on 2 drug immunosuppression including tacrolimus with a goal of 8 to 10 ng/mL and prednisone 7.5 mg daily.  Her azathioprine is currently being held for leukopenia.  Her white count is low normal today.  We will continue to hold Imuran at this point.  If her  white count remains consistently normal, will consider restarting it.  Her last PRA in August was negative.  We will recheck it during the next visit.  I anticipate that her lung function will improve once her ascites is relieved.    2.  Infectious disease: We will restart dapsone for PCP prophylaxis.  Her G6PD level is normal.  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  She will follow-up with transplant infectious disease at that time.  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  Her EBV level was recently noted to be minimally elevated, we will recheck it today.    3.  Acute renal failure: Likely related to acute calcineurin inhibitor toxicity.  The patient appears to be starting to make urine so anticipate possible renal recovery.  She continues to be on dialysis 3 times a week.  We will follow-up with  nephrology.    4.  New onset ascites with portal hypertension: The patient has persistent elevation of LFTs consistent with hepatocellular injury pattern.  Immunologic testing for causes of liver disease is unremarkable.  Patient is scheduled to follow-up with hepatology approximately 1 month from now.  We will reach out to them for any further work-up and sooner clinic follow-up given rising GGT.    5.  Hypertension: Blood pressure remains elevated but the patient has not taken her morning medications today.  She will continue amlodipine, carvedilol and Imdur.    Patient will return to clinic in approximately 1 month with repeat PFTs, imaging, labs.  She will follow-up with transplant infectious disease and hepatology in the interim.      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  Her posttransplant course was complicated by aspergillus empyema, stenotrophomonas airway infection, right mainstem bronchial stenosis, CMV reactivation, positive DSA, C. difficile infection.  Her comorbidities include hypertension and chronic kidney disease.           INTERVAL HISTORY:     The patient was recently admitted to the hospital for management of acute renal failure and is now on intermittent hemodialysis 3 times a week.  She she was also found to have ascites secondary to presumed congestive  hepatopathy/cardiac cirrhosis.  She comes in today for her hospital discharge follow-up.    She reports that from breathing standpoint she does not have any shortness of breath at rest or with mild exertion.  She does have dyspnea with moderate exertion.  The primary reason for limitation of her exertional capacity is fatigue.  She has a chronic cough productive of clear sputum.  She does not have any wheezing.  Her energy level is improving since discharge from the hospital.  She denies any hemoptysis.    She continues to report abdominal distention.  She has been completing her dialysis sessions without any difficulty.  She reports that her urine output appears to be increasing and she is starting to measure it now.         REVIEW OF SYSTEMS:     1.  She denies any recent fevers, chills, night sweats.  2.  She denies any chest pain, palpitations, lightheadedness.  3.  She denies any abdominal cramping.  She does not have GERD.  She has had a diarrhea for 2 weeks but this appears to be decreasing in frequency.  4.  She denies any new rashes or bruises.  5.  She denies any lower extremities edema.  6.  She denies headaches.  7.  She denies any visual changes.  8.  Her mood is good.  9.  She sleeps well at night.  Rest of the review of systems is negative except as noted above.       PULMONARY FUNCTION TESTS:     FEV1 1.21 L, 46% predicted.  FVC 1.66 L, 50% predicted.  FEV1/FVC 73%.  This test shows mixed obstructive and restrictive ventilatory defect however lung volumes are needed to confirm the diagnosis.  Both FEV1 and FVC are significantly lower compared to recent 50s.       CHEST IMAGING:     Bilaterally clear lung fields with likely right lower lobe atelectasis noted.  No significant effusions noted.       PHYSICAL EXAM:     Vitals:    11/11/19 1014   BP: (!) 155/95   BP Location: Right arm   Patient Position: Chair   Pulse: 84   Temp: 98.2  F (36.8  C)   TempSrc: Oral   SpO2: 100%   Weight: 58.8 kg (129 lb  11.2 oz)     General: Pleasant female, sitting comfortably, speaking in full sentences without any discomfort.  HEENT: Moist mucous membranes, no signs of oral candidiasis.  Lymphatics: No cervical or submandibular lymphadenopathy.  Pulmonary: Normal intensity breath sounds bilaterally with no added sounds.  Cardiovascular: S1, S2 normal.  Regular rate and rhythm.  No murmurs, rubs or gallops noted.  Abdomen: Distended, soft, nontender.  Musculoskeletal: No lower extremities edema, no upper extremity tremors.  Neurological: Grossly normal.  Psychiatric: Normal affect.  Skin: No rashes on limited exam.         MEDICATIONS:     Current Outpatient Medications   Medication     calcium-vitamin D (CALTRATE) 600-400 MG-UNIT per tablet     carvedilol (COREG) 25 MG tablet     dapsone (ACZONE) 100 MG tablet     ferrous sulfate (FEROSUL) 325 (65 Fe) MG tablet     magnesium oxide (MAG-OX) 400 MG tablet     multivitamin, therapeutic with minerals (THERA-VIT-M) TABS tablet     ondansetron (ZOFRAN) 4 MG tablet     pantoprazole (PROTONIX) 40 MG EC tablet     predniSONE (DELTASONE) 2.5 MG tablet     senna-docusate (SENOKOT-S/PERICOLACE) 8.6-50 MG tablet     tacrolimus (GENERIC EQUIVALENT) 0.5 MG capsule     tacrolimus (GENERIC EQUIVALENT) 1 MG capsule     voriconazole (VFEND) 200 MG tablet     voriconazole (VFEND) 50 MG tablet     No current facility-administered medications for this visit.               PAST MEDICAL HISTORY:     Past Medical History:   Diagnosis Date     Antisynthetase syndrome (H) 2014     Chronic cough      Chronic infection     recent C diff  8/18     Dehydration 8/1/2018     Dermatomyositis (H)      Dysplasia of cervix, low grade (ESTRADA 1)      ILD (interstitial lung disease) (H)      Osteopenia      PONV (postoperative nausea and vomiting)      Pulmonary hypertension (H)      Raynaud's disease      Seronegative rheumatoid arthritis (H)             PAST SURGICAL HISTORY:     Past Surgical History:   Procedure  Laterality Date     BRONCHOSCOPY (RIGID OR FLEXIBLE), DIAGNOSTIC N/A 4/10/2018    Procedure: COMBINED BRONCHOSCOPY (RIGID OR FLEXIBLE), LAVAGE;;  Surgeon: Mariposa Donohue MD;  Location: [facility]     BRONCHOSCOPY (RIGID OR FLEXIBLE), DILATE BRONCHUS / TRACHEA N/A 10/11/2018    Procedure: BRONCHOSCOPY (RIGID OR FLEXIBLE), DILATE BRONCHUS / TRACHEA;  Flexible And Rigid Bronchoscopy and Dilation;  Surgeon: Wilber Lin MD;  Location: [facility]     BRONCHOSCOPY FLEXIBLE N/A 3/13/2018    Procedure: BRONCHOSCOPY FLEXIBLE;  Flexible Bronchoscopy ;  Surgeon: Gissell Sanchez MD;  Location: [facility]     BRONCHOSCOPY FLEXIBLE N/A 5/9/2018    Procedure: BRONCHOSCOPY FLEXIBLE;;  Surgeon: Wilber Lin MD;  Location: [facility]     BRONCHOSCOPY FLEXIBLE AND RIGID N/A 9/10/2018    Procedure: BRONCHOSCOPY FLEXIBLE AND RIGID;  Flexible and Rigid Bronchoscopy with Balloon Dilation, tissue debulking with cryo, and Right mainstem bronchus stent placement;  Surgeon: Wilber Lin MD;  Location: [facility]     BRONCHOSCOPY RIGID N/A 6/6/2018    Procedure: BRONCHOSCOPY RIGID;;  Surgeon: Lopez Macias MD;  Location:  GI     BRONCHOSCOPY, DILATE BRONCHUS, STENT BRONCHUS, COMBINED N/A 6/11/2018    Procedure: COMBINED BRONCHOSCOPY, DILATE BRONCHUS, STENT BRONCHUS;  Flexible Bronchoscopy, Balloon Dilation, Bronchial Washings;  Surgeon: Wilber Lin MD;  Location: [facility]     BRONCHOSCOPY, DILATE BRONCHUS, STENT BRONCHUS, COMBINED Right 7/10/2018    Procedure: COMBINED BRONCHOSCOPY, DILATE BRONCHUS, STENT BRONCHUS;  Flexible Bronchoscopy, Balloon Dilation, Bronchial Washings  ;  Surgeon: Wilber Lin MD;  Location: [facility]     BRONCHOSCOPY, DILATE BRONCHUS, STENT BRONCHUS, COMBINED N/A 8/2/2018    Procedure: COMBINED BRONCHOSCOPY, DILATE BRONCHUS, STENT BRONCHUS;  Flexible Bronchoscopy, Bronchial Washings, Balloon Dilation;  Surgeon: Wilber Lin MD;  [facility]: [facility]     BRONCHOSCOPY,  DILATE BRONCHUS, STENT BRONCHUS, COMBINED N/A 8/20/2018    Procedure: COMBINED BRONCHOSCOPY, DILATE BRONCHUS, STENT BRONCHUS;  Flexible Bronchoscopy, Balloon Dilation;  Surgeon: Wilber Lin MD;  Location: [facility]     BRONCHOSCOPY, DILATE BRONCHUS, STENT BRONCHUS, COMBINED N/A 10/29/2018    Procedure: Flexible Bronchoscopy, Balloon Dilation, Stent Revision, Airway Examination And Therapeutic Suctioning, Cyro Tumor Debulking;  Surgeon: Wilber Lin MD;  Location: [facility]     BRONCHOSCOPY, DILATE BRONCHUS, STENT BRONCHUS, COMBINED N/A 11/20/2018    Procedure: Rigid Bronchoscopy, Stent Removal and dilitation;  Surgeon: Wilber Lin MD;  Location: [facility]     BRONCHOSCOPY, DILATE BRONCHUS, STENT BRONCHUS, COMBINED N/A 12/14/2018    Procedure: Flexible And Rigid Bronchoscopy, Balloon Dilation, Bronchial Washing;  Surgeon: Wilber Lin MD;  [facility]: [facility]     BRONCHOSCOPY, DILATE BRONCHUS, STENT BRONCHUS, COMBINED N/A 1/17/2019    Procedure: Flexible And Rigid Bronchoscopy, Balloon Dilation.;  Surgeon: Wilber Lin MD;  [facility]: [facility]     BRONCHOSCOPY, DILATE BRONCHUS, STENT BRONCHUS, COMBINED N/A 3/7/2019    Procedure: Flexible and Rigid Bronchoscopy, Bronchial Washing, Balloon Dilation;  Surgeon: Wilber Lin MD;  [facility]: [facility]     BRONCHOSCOPY, DILATE BRONCHUS, STENT BRONCHUS, COMBINED N/A 6/6/2019    Procedure: Rigid and Flexible Bronchoscopy, Balloon Dilation;  Surgeon: Wilber Lin MD;  [facility]: [facility]     BRONCHOSCOPY, DILATE BRONCHUS, STENT BRONCHUS, COMBINED N/A 10/11/2019    Procedure: Flexible and Rigid Bronchoscopy, Balloon Dilation, Bronchoalveolar Lagave;  Surgeon: Wilber Lin MD;  Location: [facility]     ENT SURGERY      tonsillectomy as a child     ESOPHAGOSCOPY, GASTROSCOPY, DUODENOSCOPY (EGD), COMBINED N/A 10/29/2018    Procedure: COMBINED ESOPHAGOSCOPY, GASTROSCOPY, DUODENOSCOPY (EGD) with biopsies and polypectomy;  Surgeon:  Chente Bloom MD;  Location: [facility]     INSERT EXTRACORPORAL MEMBRANE OXYGENATOR ADULT (OUTSIDE OR) N/A 2/27/2018    Procedure: INSERT EXTRACORPORAL MEMBRANE OXYGENATOR ADULT (OUTSIDE OR);  INSERT EXTRACORPORAL MEMBRANE OXYGENATOR ADULT (OUTSIDE OR) ;  Surgeon: Toby Hernandez MD;  Location: [facility]     IR CVC TUNNEL PLACEMENT > 5 YRS OF AGE  10/25/2019     IR THORACENTESIS  9/13/2019     no prior surgery       REMOVE EXTRACORPORAL MEMBRANE OXYGENATOR ADULT N/A 3/3/2018    Procedure: REMOVE EXTRACORPORAL MEMBRANE OXYGENATOR ADULT;  Removal of Right Femoral Venous and Right Axillary Arterial Extracorporeal Membrane Oxygenator;  Surgeon: Toby Hernandez MD;  Location: [facility]     TRANSPLANT LUNG RECIPIENT SINGLE X2 Bilateral 3/1/2018    Procedure: TRANSPLANT LUNG RECIPIENT SINGLE X2;  Median Sternotomy, Extracorporeal Membrane Oxygenator, bilateral sequential lung transplant;  Surgeon: Toby Hernandez MD;  Location: [facility]            FAMILY HISTORY:     Family History   Problem Relation Age of Onset     Hypertension Mother      Arthritis Mother      Pancreatic Cancer Father      Diabetes Father             ALLERGIES:     No Known Allergies         Data:   All laboratory and imaging data reviewed.    Results for orders placed or performed in visit on 11/11/19   Tacrolimus level     Status: Abnormal   Result Value Ref Range    Tacrolimus Last Dose [id number],855608     Tacrolimus Level 6.4 5.0 - 15.0 ug/L   CBC with platelets     Status: Abnormal   Result Value Ref Range    WBC 4.1 4.0 - 11.0 10e9/L    RBC Count 3.25 (L) 3.8 - 5.2 10e12/L    Hemoglobin 9.5 (L) 11.7 - 15.7 g/dL    Hematocrit 32.1 (L) 35.0 - 47.0 %    MCV 99 78 - 100 fl    MCH 29.2 26.5 - 33.0 pg    MCHC 29.6 (L) 31.5 - 36.5 g/dL    RDW 18.3 (H) 10.0 - 15.0 %    Platelet Count 73 (L) 150 - 450 10e9/L   Comprehensive metabolic panel     Status: Abnormal   Result Value Ref Range    Sodium 136 133 - 144 mmol/L    Potassium  3.2 (L) 3.4 - 5.3 mmol/L    Chloride 100 94 - 109 mmol/L    Carbon Dioxide 31 20 - 32 mmol/L    Anion Gap 6 3 - 14 mmol/L    Glucose 110 (H) 70 - 99 mg/dL    Urea Nitrogen 22 7 - 30 mg/dL    Creatinine 2.02 (H) 0.52 - 1.04 mg/dL    GFR Estimate 27 (L) >60 mL/min/[1.73_m2]    GFR Estimate If Black 31 (L) >60 mL/min/[1.73_m2]    Calcium 8.7 8.5 - 10.1 mg/dL    Bilirubin Total 0.4 0.2 - 1.3 mg/dL    Albumin 3.0 (L) 3.4 - 5.0 g/dL    Protein Total 7.2 6.8 - 8.8 g/dL    Alkaline Phosphatase 316 (H) 40 - 150 U/L    ALT 27 0 - 50 U/L    AST 30 0 - 45 U/L   GGT     Status: Abnormal   Result Value Ref Range     (H) 0 - 40 U/L   CMV DNA quantification     Status: Abnormal   Result Value Ref Range    CMV DNA Quantitation Specimen EDTA PLASMA     CMV Quant IU/mL <137 (A) CMVND^CMV DNA Not Detected [IU]/mL    Log IU/mL of CMVQNT <2.1 <2.1 [Log_IU]/mL   EBV DNA PCR Quantitative Whole Blood     Status: Abnormal   Result Value Ref Range    EBV DNA Copies/mL 9,669 (A) EBVNEG^EBV DNA Not Detected [Copies]/mL    EBV DNA Log of Copies 4.0 (H) <2.7 [Log_copies]/mL   Results for orders placed or performed in visit on 11/11/19   General PFT Lab (Please always keep checked)     Status: None (Preliminary result)   Result Value Ref Range    FVC-Pred 3.29 L    FVC-Pre 1.66 L    FVC-%Pred-Pre 50 %    FEV1-Pre 1.21 L    FEV1-%Pred-Pre 46 %    FEV1FVC-Pred 80 %    FEV1FVC-Pre 73 %    FEFMax-Pred 6.49 L/sec    FEFMax-Pre 3.01 L/sec    FEFMax-%Pred-Pre 46 %    FEF2575-Pred 2.45 L/sec    FEF2575-Pre 0.87 L/sec    JIE9799-%Pred-Pre 35 %    ExpTime-Pre 7.61 sec    FIFMax-Pre 2.54 L/sec    FEV1FEV6-Pred 81 %    FEV1FEV6-Pre 73 %       The above note was dictated using voice recognition software and may include typographical errors. Please contact the author for any clarifications.    Dean Riley MD   of Medicine  Pulmonary, Critical Care and Sleep Medicine  [facility]  Pager: [phone]

## 2019-11-18 NOTE — TELEPHONE ENCOUNTER
Patient Call: Medication Clarification    Name of Medication: Voticonazole  Dose: 50mg       Call back needed? Yes    Return Call Needed  Same as documented in contacts section  When to return call?: Same day: Route High Priority

## 2019-11-18 NOTE — TELEPHONE ENCOUNTER
Pt states that insurance will only cover 4 tablets and not 5 tablets of the prescribed 250 mg (50 mg tablets) Voriconazole tablets. Changed order to 200 mg bid and 50 mg bid=250 mg bid to equal 4 tablets total daily.

## 2019-11-19 ENCOUNTER — TELEPHONE (OUTPATIENT)
Dept: SLEEP MEDICINE | Facility: CLINIC | Age: 59
End: 2019-11-19

## 2019-11-19 DIAGNOSIS — Z94.2 LUNG REPLACED BY TRANSPLANT (H): ICD-10-CM

## 2019-11-19 PROCEDURE — 80197 ASSAY OF TACROLIMUS: CPT | Performed by: INTERNAL MEDICINE

## 2019-11-20 ENCOUNTER — TELEPHONE (OUTPATIENT)
Dept: TRANSPLANT | Facility: CLINIC | Age: 59
End: 2019-11-20

## 2019-11-20 LAB
TACROLIMUS BLD-MCNC: 9.2 UG/L (ref 5–15)
TME LAST DOSE: NORMAL H

## 2019-11-20 NOTE — TELEPHONE ENCOUNTER
Magnesium level 1.4, reviewed with patient and she is increasing Magnesium from 400mg once daily to BID. Her nephrologist (Pee) plans to change to a slow release Magnesium and is also getting IV magnesium tomorrow in [place].  Patient willcall with change in magnesium dosing and medications.

## 2019-11-21 LAB
TACROLIMUS BLD-MCNC: NORMAL UG/L (ref 5–15)
TME LAST DOSE: NORMAL H

## 2019-11-27 DIAGNOSIS — Z94.2 S/P LUNG TRANSPLANT (H): ICD-10-CM

## 2019-11-27 PROCEDURE — 80197 ASSAY OF TACROLIMUS: CPT | Performed by: INTERNAL MEDICINE

## 2019-11-29 LAB
TACROLIMUS BLD-MCNC: 9.6 UG/L (ref 5–15)
TME LAST DOSE: NORMAL H

## 2019-12-02 NOTE — RESULT ENCOUNTER NOTE
Armand Mcdonnell, your tacrolimus level was 9.6 at 12 hours on 11/27/19 which is within your goal range of 8-10. No dose change at this time. Please call the transplant office ([phone]) with any questions. Thanks, Mikayla

## 2019-12-03 ENCOUNTER — TRANSFERRED RECORDS (OUTPATIENT)
Dept: HEALTH INFORMATION MANAGEMENT | Facility: CLINIC | Age: 59
End: 2019-12-03

## 2019-12-03 ENCOUNTER — TELEPHONE (OUTPATIENT)
Dept: TRANSPLANT | Facility: CLINIC | Age: 59
End: 2019-12-03

## 2019-12-03 DIAGNOSIS — R11.2 INTRACTABLE VOMITING WITH NAUSEA, UNSPECIFIED VOMITING TYPE: ICD-10-CM

## 2019-12-03 DIAGNOSIS — Z79.2 ADMINISTRATION OF LONG-TERM PROPHYLACTIC ANTIBIOTICS: ICD-10-CM

## 2019-12-03 DIAGNOSIS — Z94.2 LUNG REPLACED BY TRANSPLANT (H): ICD-10-CM

## 2019-12-03 DIAGNOSIS — Z94.2 S/P LUNG TRANSPLANT (H): ICD-10-CM

## 2019-12-03 PROCEDURE — 80197 ASSAY OF TACROLIMUS: CPT | Performed by: INTERNAL MEDICINE

## 2019-12-03 RX ORDER — ONDANSETRON 4 MG/1
4 TABLET, FILM COATED ORAL EVERY 12 HOURS PRN
Qty: 60 TABLET | Refills: 0 | Status: SHIPPED | OUTPATIENT
Start: 2019-12-03 | End: 2020-09-09

## 2019-12-03 RX ORDER — DAPSONE 100 MG/1
100 TABLET ORAL DAILY
Qty: 30 TABLET | Refills: 11 | Status: SHIPPED | OUTPATIENT
Start: 2019-12-03 | End: 2020-04-13

## 2019-12-03 NOTE — TELEPHONE ENCOUNTER
Spoke with patient. She reports she's been off dialysis since a little before thanksgiving. Discussed CO2 of 17 with nurse at patient's nephrology's office, Dr. Glasgow. She will report this to MD, but did not think dialysis was urgently needed at this point. Also noted creatinine 2.7. Patient follows up with their team on 12/9. Patient reports she is having ascites again, nephrology team is helping arrange paracentesis closer to home.     After call, reviewed Care Everywhere and Dr. Glasgow aware of results and started patient on baking soda, repeat labs Thursday.

## 2019-12-03 NOTE — TELEPHONE ENCOUNTER
DATE:  12/3/2019   TIME OF RECEIPT FROM LAB:  3:45 PM  LAB TEST:  Alk phos, Co2  LAB VALUE:  354, 17.4  RESULTS GIVEN WITH READ-BACK TO (PROVIDER):  Mikayla Latham RN  TIME LAB VALUE REPORTED TO PROVIDER:   3:53 PM

## 2019-12-04 LAB
TACROLIMUS BLD-MCNC: 13.7 UG/L (ref 5–15)
TME LAST DOSE: NORMAL H

## 2019-12-05 ENCOUNTER — TELEPHONE (OUTPATIENT)
Dept: TRANSPLANT | Facility: CLINIC | Age: 59
End: 2019-12-05

## 2019-12-05 DIAGNOSIS — Z94.2 S/P LUNG TRANSPLANT (H): ICD-10-CM

## 2019-12-05 LAB
MYCOBACTERIUM SPEC CULT: NORMAL
MYCOBACTERIUM SPEC CULT: NORMAL
SPECIMEN SOURCE: NORMAL

## 2019-12-05 RX ORDER — TACROLIMUS 1 MG/1
1 CAPSULE ORAL EVERY EVENING
Qty: 30 CAPSULE | Refills: 11 | Status: SHIPPED | OUTPATIENT
Start: 2019-12-05 | End: 2019-12-18

## 2019-12-05 RX ORDER — TACROLIMUS 0.5 MG/1
0.5 CAPSULE ORAL 2 TIMES DAILY
Qty: 60 CAPSULE | Refills: 11 | Status: SHIPPED | OUTPATIENT
Start: 2019-12-05 | End: 2019-12-18

## 2019-12-05 NOTE — TELEPHONE ENCOUNTER
DATE:  12/5/2019   TIME OF RECEIPT FROM LAB:  1:28 M  LAB TEST:  BUN  LAB VALUE:  75.7  RESULTS GIVEN WITH READ-BACK TO (PROVIDER):  Mikayla Latham RN sent Contur message and paged  TIME LAB VALUE REPORTED TO PROVIDER:   1:38 PM

## 2019-12-05 NOTE — TELEPHONE ENCOUNTER
Patient is to see Nephrology on 12/9. Will await other labs results and then need to let Nephrology clinic know what the results are. BUN was 75.7 today 12/4/19

## 2019-12-05 NOTE — TELEPHONE ENCOUNTER
Tacrolimus level 13.7 at 12 hours, on 12/3/19  Goal 8-10.   Current dose 0.5 mg in AM, 1 mg in PM  HOLD EVENING DOSE TONIGHT 12/5/19  Dose changed to  0.5 mg in AM, 0.5 mg in PM   Recheck level in 5 days 12/10/19    Spoke to Kecia and she states she got a call from her Dialysis center and her Cr is 3.1 now and BUN is at 75.7. Patient sees Nephrology team on 12/9. She states she will call if anything else changes in the meantime.

## 2019-12-07 LAB
MYCOBACTERIUM SPEC CULT: NORMAL
MYCOBACTERIUM SPEC CULT: NORMAL
SPECIMEN SOURCE: NORMAL

## 2019-12-09 ENCOUNTER — TELEPHONE (OUTPATIENT)
Dept: TRANSPLANT | Facility: CLINIC | Age: 59
End: 2019-12-09

## 2019-12-09 NOTE — TELEPHONE ENCOUNTER
"Patient calls to say she's short of breath and has the chills. States she had a paracentesis last week where they took off 4.7L and \"it's now all back\". Patient sees nephrologist this afternoon and will call back with updates.   "

## 2019-12-10 ENCOUNTER — TELEPHONE (OUTPATIENT)
Dept: TRANSPLANT | Facility: CLINIC | Age: 59
End: 2019-12-10

## 2019-12-10 DIAGNOSIS — R79.89 ELEVATED LIVER FUNCTION TESTS: Primary | ICD-10-CM

## 2019-12-10 DIAGNOSIS — Z94.2 LUNG REPLACED BY TRANSPLANT (H): ICD-10-CM

## 2019-12-10 PROCEDURE — 80197 ASSAY OF TACROLIMUS: CPT | Performed by: INTERNAL MEDICINE

## 2019-12-10 NOTE — TELEPHONE ENCOUNTER
Alk phos stable. Spoke with staff at patient's nephology clinic due to BUN and CO2 who report patient has restarted dialysis and is getting hooked up right now to start her run.

## 2019-12-11 LAB
TACROLIMUS BLD-MCNC: 8.4 UG/L (ref 5–15)
TME LAST DOSE: NORMAL H

## 2019-12-12 ENCOUNTER — PRE VISIT (OUTPATIENT)
Dept: GASTROENTEROLOGY | Facility: CLINIC | Age: 59
End: 2019-12-12

## 2019-12-12 NOTE — RESULT ENCOUNTER NOTE
Armand Mcdonnell, your tacrolimus level was 8.4 at 12 hours on 12/10/19 which is within your goal range of 8-10. No dose change at this time. Please call the transplant office ([phone]) with any questions. Thanks, Mikayla

## 2019-12-13 NOTE — PROGRESS NOTES
Was the patient contacted by phone and reminded of the upcoming visit? Yes, reminded of all appointments on 12/18/19    Was the patient instructed to bring a current list of all medications to the appointment or instructed to bring in all medication bottles? Yes, patient verbalized understanding    Is it anticipated the patient will need additional appointments? Yes, PFT's, Labs, Xray, Pulmonary and ID    Were the additional appointments scheduled? Yes    Was the patient instructed to arrive prior to the appointment time to have ordered labs drawn? Yes, patient verbalized understanding    Were the needed lab orders placed? Yes    Was lab appointment made 1 hour or more prior to visit? Yes    Patient instructed to arrive early for check-in    Gianna Rogers CMA CMA  12/12/2019 6:26 PM

## 2019-12-16 NOTE — PROGRESS NOTES
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  Her posttransplant course has been significantly complicated as described below.     #  History of bilateral sequential lung transplant for ILD: She is not having acute respiratory symptoms but her pulmonary function tests have declined since she developed ascites, presumably because of limitation of diaphragmatic movement. Her chest imaging is unremarkable.  She is currently on 2 drug immunosuppression including tacrolimus with a goal of 8 to 10 ng/mL and prednisone 7.5 mg daily.       # ID prophylaxis: dapsone for PCP prophylaxis, started during October 2019 hospitalization when Bactrim was stopped. (G6PD level is normal.    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  The patient will continue on voriconazole for a prolonged course. Refer to chart ID  note of today for details. CT today showed some decrease in left pleural fluid.    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 She continues to be on dialysis 3 times a week. Has oral fluid limit of 1/5 liters/24 hours.     # Recent onset ascites with portal hypertension: Refer to Hepatology note of today (12/18/19) for details. Tests for hep B, hep C, autoimmune hepatitis and PBC have been negative. Hepatology impression is that liver disease is either due to chronic right heart dysfunction vs nodular regenerative hyperplasia. Recommended  "re-evaluation by cardiology for repeat right heart cath and transjugular liver biopsy. Consider TIPS, depending on biopsy results.     # Hypertension: She will continue amlodipine, carvedilol and Imdur.    # Healthcare maintenance--Tidedlaty50 administered today.     Please also refer to RN Transplant Coordinator note for additional information related to this visit.    PATIENT PROFILE AND TRANSPLANT HISTORY:  Current age:                    59 year old  Underlying lung disease: IIP: Nonspecific Interstitial Pneumonia    Transplant date(s):        3/1/2018 (Lung)  Transplant POD(s):        655  Lung transplant type(s): Bilateral single lung      Transplant coordinator:   Mikayla Latham  Transplant provider(s):        Ame Servin    Active Patient Thresholds     Lab Low High Effective Since Comment    Tacrolimus Level 8 10 06/06/2019 6/6/19 CP          INTERVAL HISTORY:  --Most recent resulted PRA: 12/18/19, neg for DSA    --Most recent lung transplant clinic visit: 11/11/19 ([facility])    --Interval clinic visits, test results, signif medical events (obtained by chart review and patient hx):    12/3/19: Off dialysis since ~Thanksgiving. Started on bicarbonate form HCO3 17    12/9/19: Pt called coordinator: \"Patient calls to say she's short of breath and has the chills. States she had a paracentesis last week where they took off 4.7L and \"it's now all back\". Patient sees nephrologist this afternoon and will call back with updates\"    12/10/19: Started back on dialysis for low urine output and increased ascites    12/11/19: Had 3.3 liter paracentesis    --Today:    Ms. Blue returned to clinic today for a previously scheduled clinic visit.  She reports that her breathing \"goodness\" depends on how much ascites she has and that she improves temporarily after each paracentesis.  She has been having URI symptoms for the past couple of days.  She is not having purulent sputum, hemoptysis, chest pain, " or wheezing.  She is not having fevers.    REVIEW OF SYSTEMS:    #She has little urine output.  As noted above, she is back on dialysis and is tolerating the runs well    #She is not having abdominal pain    She is not having  symptoms    Complete review of systems was asked and was otherwise negative.    PHYSICAL EXAM:  Vitals:    12/18/19 1050   BP: 108/70   Pulse: 89   SpO2: 97%   Weight: 60.1 kg (132 lb 6.4 oz)     Constitutional:    Awake, alert and in no apparent distress breathing room air at rest.    Eyes:    Anicteric, PERRL   ENT:    oral mucosa moist without lesions    Neck:    Supple without supraclavicular or cervical lymphadenopathy    Lungs:    Good air entry both lungs. No crackles. No rhonchi. No wheezes.    Cardiovascular:    Normal S1 and S2. No JVD, murmur, rub, chavez.RRR   Abdomen:    NABS, soft, nontender. No HSM. Moderately distended but not tense.    Musculoskeletal:    Trace edema bilat below knees   Neurologic:    Alert and conversant.    Skin:    Warm, dry. No rash seen.          Data:     I reviewed all resulted lab tests and imaging studies.    Results for orders placed or performed in visit on 12/18/19   HLA Donor Specific Antibody     Status: None   Result Value Ref Range    Donor Identification 03/01/2018     Organ Lung     DSA Present NO     DSA Comments        Flow Single Antigen Beads assays are intended for   detection/identification of IgG anti-HLA antibodies. Mfi values may not   accurately quantify donor-specific antibody levels in all instances.      DSA Test Method SA FCS    Results for orders placed or performed in visit on 12/18/19   Respiratory Panel PCR - NP Swab     Status: None   Result Value Ref Range    Adenovirus Not Detected NDET^Not Detected    Coronavirus Not Detected NDET^Not Detected    Human Metapneumovirus Not Detected NDET^Not Detected    Human Rhinovirus/Enterovirus Not Detected NDET^Not Detected    Influenza A Not Detected NDET^Not Detected    Influenza  A, H1 Not Detected NDET^Not Detected    Influenza A 2009 H1N1 Not Detected NDET^Not Detected    Influenza A, H3 Not Detected NDET^Not Detected    Influenza B Not Detected NDET^Not Detected    Parainfluenza Virus 1 Not Detected NDET^Not Detected    Parainfluenza Virus 2 Not Detected NDET^Not Detected    Parainfluenza Virus 3 Not Detected NDET^Not Detected    Parainfluenza Virus 4 Not Detected NDET^Not Detected    Respiratory Syncytial Virus A Not Detected NDET^Not Detected    Respiratory Syncytial Virus B Not Detected NDET^Not Detected    Chlamydia pneumoniae Not Detected NDET^Not Detected    Mycoplasma pneumoniae Not Detected NDET^Not Detected    Respiratory Virus Comment See comment below    Results for orders placed or performed in visit on 12/18/19   INR     Status: None   Result Value Ref Range    INR 1.13 0.86 - 1.14   Hepatic panel     Status: Abnormal   Result Value Ref Range    Bilirubin Direct 0.2 0.0 - 0.2 mg/dL    Bilirubin Total 0.5 0.2 - 1.3 mg/dL    Albumin 3.3 (L) 3.4 - 5.0 g/dL    Protein Total 6.6 (L) 6.8 - 8.8 g/dL    Alkaline Phosphatase 278 (H) 40 - 150 U/L    ALT 18 0 - 50 U/L    AST 18 0 - 45 U/L   Basic metabolic panel     Status: Abnormal   Result Value Ref Range    Sodium 136 133 - 144 mmol/L    Potassium 3.2 (L) 3.4 - 5.3 mmol/L    Chloride 98 94 - 109 mmol/L    Carbon Dioxide 33 (H) 20 - 32 mmol/L    Anion Gap 4 3 - 14 mmol/L    Glucose 111 (H) 70 - 99 mg/dL    Urea Nitrogen 21 7 - 30 mg/dL    Creatinine 2.71 (H) 0.52 - 1.04 mg/dL    GFR Estimate 19 (L) >60 mL/min/[1.73_m2]    GFR Estimate If Black 22 (L) >60 mL/min/[1.73_m2]    Calcium 8.0 (L) 8.5 - 10.1 mg/dL   Tacrolimus level     Status: Abnormal   Result Value Ref Range    Tacrolimus Last Dose 12/17/19  2100     Tacrolimus Level 6.9 5.0 - 15.0 ug/L   EBV DNA PCR Quantitative Whole Blood     Status: Abnormal   Result Value Ref Range    EBV DNA Copies/mL 11,933 (A) EBVNEG^EBV DNA Not Detected [Copies]/mL    EBV DNA Log of Copies 4.1 (H)  <2.7 [Log_copies]/mL   PRA Donor Specific Antibody     Status: None   Result Value Ref Range    SA1 Test Method SA FCS     SA1 Cell Class I     SA1 Hi Risk Madelin None     SA1 Mod Risk Madelin None     SA1 Comments        Test performed by modified procedure. Serum heat inactivated and tested   by a modified (Nipomo) protocol including fetal calf serum addition.   High-risk, mfi >3,000. Mod-risk, mfi 500-3,000.      SA2 Test Method SA FCS     SA2 Cell Class II     SA2 Hi Risk Madelin None     SA2 Mod Risk Madelin DP:19     SA2 Comments        Test performed by modified procedure. Serum heat inactivated and tested   by a modified (Nipomo) protocol including fetal calf serum addition.   High-risk, mfi >3,000. Mod-risk, mfi 500-3,000.      UNOS cPRA 0     Unacceptable Antigen None>3000     Magnesium     Status: Abnormal   Result Value Ref Range    Magnesium 1.4 (L) 1.6 - 2.3 mg/dL   CBC with platelets differential     Status: Abnormal   Result Value Ref Range    WBC 6.5 4.0 - 11.0 10e9/L    RBC Count 3.31 (L) 3.8 - 5.2 10e12/L    Hemoglobin 10.1 (L) 11.7 - 15.7 g/dL    Hematocrit 32.9 (L) 35.0 - 47.0 %    MCV 99 78 - 100 fl    MCH 30.5 26.5 - 33.0 pg    MCHC 30.7 (L) 31.5 - 36.5 g/dL    RDW 15.2 (H) 10.0 - 15.0 %    Platelet Count 86 (L) 150 - 450 10e9/L    Diff Method Automated Method     % Neutrophils 73.9 %    % Lymphocytes 11.8 %    % Monocytes 12.3 %    % Eosinophils 1.1 %    % Basophils 0.6 %    % Immature Granulocytes 0.3 %    Nucleated RBCs 0 0 /100    Absolute Neutrophil 4.8 1.6 - 8.3 10e9/L    Absolute Lymphocytes 0.8 0.8 - 5.3 10e9/L    Absolute Monocytes 0.8 0.0 - 1.3 10e9/L    Absolute Eosinophils 0.1 0.0 - 0.7 10e9/L    Absolute Basophils 0.0 0.0 - 0.2 10e9/L    Abs Immature Granulocytes 0.0 0 - 0.4 10e9/L    Absolute Nucleated RBC 0.0    Results for orders placed or performed in visit on 12/18/19   General PFT Lab (Please always keep checked)     Status: None (Preliminary result)   Result Value Ref Range    FVC-Pred 3.26  L    FVC-Pre 1.73 L    FVC-%Pred-Pre 52 %    FEV1-Pre 1.19 L    FEV1-%Pred-Pre 46 %    FEV1FVC-Pred 80 %    FEV1FVC-Pre 69 %    FEFMax-Pred 6.45 L/sec    FEFMax-Pre 2.52 L/sec    FEFMax-%Pred-Pre 39 %    FEF2575-Pred 2.41 L/sec    FEF2575-Pre 0.79 L/sec    TGT5876-%Pred-Pre 33 %    ExpTime-Pre 6.79 sec    FIFMax-Pre 2.49 L/sec    FEV1FEV6-Pred 81 %    FEV1FEV6-Pre 69 %       PULMONARY FUNCTION TEST INTERPRETATION:    Pulmonary function test (spirometry only) dated 12/18/2019 were reviewed and interpreted by me.  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  When compared with this patient's most recent previous test, dated 11/11/2019, there have been no significant changes.  However there has been a significant decrease in both FEV1 and FVC compared with 8/7/2019.      STUDIES STILL PENDING AT THE TIME OF THIS NOTE:  Unresulted Labs Ordered in the Past 30 Days of this Admission     Date and Time Order Name Status Description    12/18/2019 1151 RESPIRATORY PANEL PCR - NP SWAB In process     12/18/2019 0843 PRA DONOR SPECIFIC ANTIBODY In process     12/18/2019 0843 EBV DNA PCR QUANTITATIVE WHOLE BLOOD In process           Complexity indicators:    --immune compromised, on high-risk medications x   --organ transplant recipient x   --multiple organ transplant recipient    --active respiratory infection    --within one year of transplant; and/or within one month of hospitalization    --chronic lung allograft dysfunction syndrome (CLAD, chronic rejection, or bronchiolitis obliterans syndrome)    --new medical problem addressed during this visit    --multiple active medical problems x   --admitted directly to hospital from this clinic visit    -->50% of this visit was spent in counseling and care coordination. If yes, total visit time was              Medications:     Current Outpatient Medications   Medication     calcium-vitamin D (CALTRATE) 600-400 MG-UNIT per  tablet     carvedilol (COREG) 25 MG tablet     dapsone (ACZONE) 100 MG tablet     ferrous sulfate (FEROSUL) 325 (65 Fe) MG tablet     magnesium oxide (MAG-OX) 400 MG tablet     multivitamin, therapeutic with minerals (THERA-VIT-M) TABS tablet     ondansetron (ZOFRAN) 4 MG tablet     pantoprazole (PROTONIX) 40 MG EC tablet     predniSONE (DELTASONE) 2.5 MG tablet     senna-docusate (SENOKOT-S/PERICOLACE) 8.6-50 MG tablet     tacrolimus (GENERIC EQUIVALENT) 0.5 MG capsule     tacrolimus (GENERIC EQUIVALENT) 1 MG capsule     voriconazole (VFEND) 200 MG tablet     voriconazole (VFEND) 50 MG tablet     No current facility-administered medications for this visit.             Past Medical and Surgical History:     Past Medical History:   Diagnosis Date     Antisynthetase syndrome (H) 2014     Chronic cough      Chronic infection     recent C diff  8/18     Dehydration 8/1/2018     Dermatomyositis (H)      Dysplasia of cervix, low grade (ESTRADA 1)      ILD (interstitial lung disease) (H)      Osteopenia      PONV (postoperative nausea and vomiting)      Pulmonary hypertension (H)      Raynaud's disease      Seronegative rheumatoid arthritis (H)      Past Surgical History:   Procedure Laterality Date     BRONCHOSCOPY (RIGID OR FLEXIBLE), DIAGNOSTIC N/A 4/10/2018    Procedure: COMBINED BRONCHOSCOPY (RIGID OR FLEXIBLE), LAVAGE;;  Surgeon: Mariposa Donohue MD;  Location:  GI     BRONCHOSCOPY (RIGID OR FLEXIBLE), DILATE BRONCHUS / TRACHEA N/A 10/11/2018    Procedure: BRONCHOSCOPY (RIGID OR FLEXIBLE), DILATE BRONCHUS / TRACHEA;  Flexible And Rigid Bronchoscopy and Dilation;  Surgeon: Wilber Lin MD;  Location: [facility]     BRONCHOSCOPY FLEXIBLE N/A 3/13/2018    Procedure: BRONCHOSCOPY FLEXIBLE;  Flexible Bronchoscopy ;  Surgeon: Gissell Sanchez MD;  Location:  GI     BRONCHOSCOPY FLEXIBLE N/A 5/9/2018    Procedure: BRONCHOSCOPY FLEXIBLE;;  Surgeon: Wilber Lin MD;  Location: [facility]     BRONCHOSCOPY  FLEXIBLE AND RIGID N/A 9/10/2018    Procedure: BRONCHOSCOPY FLEXIBLE AND RIGID;  Flexible and Rigid Bronchoscopy with Balloon Dilation, tissue debulking with cryo, and Right mainstem bronchus stent placement;  Surgeon: Wilber Lin MD;  Location: [facility]     BRONCHOSCOPY RIGID N/A 6/6/2018    Procedure: BRONCHOSCOPY RIGID;;  Surgeon: Lopez Macias MD;  Location: [facility]     BRONCHOSCOPY, DILATE BRONCHUS, STENT BRONCHUS, COMBINED N/A 6/11/2018    Procedure: COMBINED BRONCHOSCOPY, DILATE BRONCHUS, STENT BRONCHUS;  Flexible Bronchoscopy, Balloon Dilation, Bronchial Washings;  Surgeon: Wilber Lin MD;  Location: [facility]     BRONCHOSCOPY, DILATE BRONCHUS, STENT BRONCHUS, COMBINED Right 7/10/2018    Procedure: COMBINED BRONCHOSCOPY, DILATE BRONCHUS, STENT BRONCHUS;  Flexible Bronchoscopy, Balloon Dilation, Bronchial Washings  ;  Surgeon: Wilber Lin MD;  Location: [facility]     BRONCHOSCOPY, DILATE BRONCHUS, STENT BRONCHUS, COMBINED N/A 8/2/2018    Procedure: COMBINED BRONCHOSCOPY, DILATE BRONCHUS, STENT BRONCHUS;  Flexible Bronchoscopy, Bronchial Washings, Balloon Dilation;  Surgeon: Wilber Lin MD;  [facility]: [facility]     BRONCHOSCOPY, DILATE BRONCHUS, STENT BRONCHUS, COMBINED N/A 8/20/2018    Procedure: COMBINED BRONCHOSCOPY, DILATE BRONCHUS, STENT BRONCHUS;  Flexible Bronchoscopy, Balloon Dilation;  Surgeon: Wilber Lin MD;  [facility]: [facility]     BRONCHOSCOPY, DILATE BRONCHUS, STENT BRONCHUS, COMBINED N/A 10/29/2018    Procedure: Flexible Bronchoscopy, Balloon Dilation, Stent Revision, Airway Examination And Therapeutic Suctioning, Cyro Tumor Debulking;  Surgeon: Wilber Lin MD;  Location: [facility]     BRONCHOSCOPY, DILATE BRONCHUS, STENT BRONCHUS, COMBINED N/A 11/20/2018    Procedure: Rigid Bronchoscopy, Stent Removal and dilitation;  Surgeon: Wilber Lin MD;  [facility]: [facility]     BRONCHOSCOPY, DILATE BRONCHUS, STENT BRONCHUS, COMBINED N/A 12/14/2018     Procedure: Flexible And Rigid Bronchoscopy, Balloon Dilation, Bronchial Washing;  Surgeon: Wilber Lin MD;  [facility]: [facility]     BRONCHOSCOPY, DILATE BRONCHUS, STENT BRONCHUS, COMBINED N/A 1/17/2019    Procedure: Flexible And Rigid Bronchoscopy, Balloon Dilation.;  Surgeon: Wilber Lin MD;  [facility]: [facility]     BRONCHOSCOPY, DILATE BRONCHUS, STENT BRONCHUS, COMBINED N/A 3/7/2019    Procedure: Flexible and Rigid Bronchoscopy, Bronchial Washing, Balloon Dilation;  Surgeon: Wilber Lin MD;  [facility]: [facility]     BRONCHOSCOPY, DILATE BRONCHUS, STENT BRONCHUS, COMBINED N/A 6/6/2019    Procedure: Rigid and Flexible Bronchoscopy, Balloon Dilation;  Surgeon: Wilber Lin MD;  [facility]: [facility]     BRONCHOSCOPY, DILATE BRONCHUS, STENT BRONCHUS, COMBINED N/A 10/11/2019    Procedure: Flexible and Rigid Bronchoscopy, Balloon Dilation, Bronchoalveolar Lagave;  Surgeon: Wilber Lin MD;  Location: [facility]     ENT SURGERY      tonsillectomy as a child     ESOPHAGOSCOPY, GASTROSCOPY, DUODENOSCOPY (EGD), COMBINED N/A 10/29/2018    Procedure: COMBINED ESOPHAGOSCOPY, GASTROSCOPY, DUODENOSCOPY (EGD) with biopsies and polypectomy;  Surgeon: Chente Bloom MD;  Location: [facility]     INSERT EXTRACORPORAL MEMBRANE OXYGENATOR ADULT (OUTSIDE OR) N/A 2/27/2018    Procedure: INSERT EXTRACORPORAL MEMBRANE OXYGENATOR ADULT (OUTSIDE OR);  INSERT EXTRACORPORAL MEMBRANE OXYGENATOR ADULT (OUTSIDE OR) ;  Surgeon: Toby Hernandez MD;  Location: [facility]     IR CVC TUNNEL PLACEMENT > 5 YRS OF AGE  10/25/2019     IR THORACENTESIS  9/13/2019     no prior surgery       REMOVE EXTRACORPORAL MEMBRANE OXYGENATOR ADULT N/A 3/3/2018    Procedure: REMOVE EXTRACORPORAL MEMBRANE OXYGENATOR ADULT;  Removal of Right Femoral Venous and Right Axillary Arterial Extracorporeal Membrane Oxygenator;  Surgeon: Toby Hernandez MD;  Location: [facility]     TRANSPLANT LUNG RECIPIENT SINGLE X2 Bilateral  3/1/2018    Procedure: TRANSPLANT LUNG RECIPIENT SINGLE X2;  Median Sternotomy, Extracorporeal Membrane Oxygenator, bilateral sequential lung transplant;  Surgeon: Toby Hernandez MD;  Location:  OR           Family History:     Family History   Problem Relation Age of Onset     Hypertension Mother      Arthritis Mother      Pancreatic Cancer Father      Diabetes Father

## 2019-12-17 ASSESSMENT — ENCOUNTER SYMPTOMS
SPUTUM PRODUCTION: 1
COUGH DISTURBING SLEEP: 0
WHEEZING: 1
HEARTBURN: 0
TASTE DISTURBANCE: 0
SMELL DISTURBANCE: 0
BLOATING: 1
SORE THROAT: 0
SINUS PAIN: 0
BOWEL INCONTINENCE: 0
VOMITING: 0
INCREASED ENERGY: 0
NECK MASS: 0
SINUS CONGESTION: 0
RECTAL PAIN: 0
DIARRHEA: 1
HALLUCINATIONS: 0
SHORTNESS OF BREATH: 1
FATIGUE: 1
POSTURAL DYSPNEA: 0
DECREASED APPETITE: 0
TROUBLE SWALLOWING: 0
FEVER: 0
NIGHT SWEATS: 0
WEIGHT LOSS: 0
CHILLS: 1
HOARSE VOICE: 0
POLYPHAGIA: 0
POLYDIPSIA: 0
JAUNDICE: 0
ALTERED TEMPERATURE REGULATION: 1
SNORES LOUDLY: 0
HEMOPTYSIS: 0
ABDOMINAL PAIN: 0
DYSPNEA ON EXERTION: 1
BLOOD IN STOOL: 0
CONSTIPATION: 0
COUGH: 1
NAUSEA: 0
WEIGHT GAIN: 1

## 2019-12-18 ENCOUNTER — OFFICE VISIT (OUTPATIENT)
Dept: GASTROENTEROLOGY | Facility: CLINIC | Age: 59
End: 2019-12-18
Attending: FAMILY MEDICINE
Payer: COMMERCIAL

## 2019-12-18 ENCOUNTER — ANCILLARY PROCEDURE (OUTPATIENT)
Dept: CT IMAGING | Facility: CLINIC | Age: 59
End: 2019-12-18
Attending: INTERNAL MEDICINE
Payer: COMMERCIAL

## 2019-12-18 ENCOUNTER — ANCILLARY PROCEDURE (OUTPATIENT)
Dept: GENERAL RADIOLOGY | Facility: CLINIC | Age: 59
End: 2019-12-18
Attending: INTERNAL MEDICINE
Payer: COMMERCIAL

## 2019-12-18 ENCOUNTER — PRE VISIT (OUTPATIENT)
Dept: GASTROENTEROLOGY | Facility: CLINIC | Age: 59
End: 2019-12-18

## 2019-12-18 ENCOUNTER — OFFICE VISIT (OUTPATIENT)
Dept: TRANSPLANT | Facility: CLINIC | Age: 59
End: 2019-12-18
Attending: INTERNAL MEDICINE
Payer: COMMERCIAL

## 2019-12-18 ENCOUNTER — OFFICE VISIT (OUTPATIENT)
Dept: INFECTIOUS DISEASES | Facility: CLINIC | Age: 59
End: 2019-12-18
Attending: INTERNAL MEDICINE
Payer: COMMERCIAL

## 2019-12-18 ENCOUNTER — RESULTS ONLY (OUTPATIENT)
Dept: OTHER | Facility: CLINIC | Age: 59
End: 2019-12-18

## 2019-12-18 ENCOUNTER — TELEPHONE (OUTPATIENT)
Dept: TRANSPLANT | Facility: CLINIC | Age: 59
End: 2019-12-18

## 2019-12-18 VITALS
BODY MASS INDEX: 22.61 KG/M2 | OXYGEN SATURATION: 97 % | SYSTOLIC BLOOD PRESSURE: 108 MMHG | DIASTOLIC BLOOD PRESSURE: 70 MMHG | HEIGHT: 64 IN | WEIGHT: 132.4 LBS | HEART RATE: 89 BPM

## 2019-12-18 VITALS
WEIGHT: 132.4 LBS | HEART RATE: 89 BPM | SYSTOLIC BLOOD PRESSURE: 108 MMHG | DIASTOLIC BLOOD PRESSURE: 70 MMHG | BODY MASS INDEX: 22.73 KG/M2 | OXYGEN SATURATION: 97 %

## 2019-12-18 VITALS
DIASTOLIC BLOOD PRESSURE: 78 MMHG | OXYGEN SATURATION: 95 % | HEART RATE: 101 BPM | WEIGHT: 132 LBS | BODY MASS INDEX: 22.66 KG/M2 | SYSTOLIC BLOOD PRESSURE: 113 MMHG

## 2019-12-18 DIAGNOSIS — J86.9 EMPYEMA LUNG (H): Primary | ICD-10-CM

## 2019-12-18 DIAGNOSIS — J39.8 CONGESTION OF UPPER RESPIRATORY TRACT: ICD-10-CM

## 2019-12-18 DIAGNOSIS — Z94.2 LUNG TRANSPLANT STATUS, BILATERAL (H): ICD-10-CM

## 2019-12-18 DIAGNOSIS — Z94.2 S/P LUNG TRANSPLANT (H): ICD-10-CM

## 2019-12-18 DIAGNOSIS — K76.6 PORTAL HYPERTENSION (H): Primary | ICD-10-CM

## 2019-12-18 DIAGNOSIS — Z23 IMMUNIZATION DUE: ICD-10-CM

## 2019-12-18 DIAGNOSIS — I10 ESSENTIAL HYPERTENSION: ICD-10-CM

## 2019-12-18 DIAGNOSIS — B44.89 INFECTION BY ASPERGILLUS FUMIGATUS (H): ICD-10-CM

## 2019-12-18 DIAGNOSIS — M86.68 OTHER CHRONIC OSTEOMYELITIS, OTHER SITE (H): ICD-10-CM

## 2019-12-18 DIAGNOSIS — Z94.2 S/P LUNG TRANSPLANT (H): Primary | ICD-10-CM

## 2019-12-18 DIAGNOSIS — B25.9 CYTOMEGALOVIRUS (CMV) VIREMIA (H): ICD-10-CM

## 2019-12-18 DIAGNOSIS — R79.89 ELEVATED LIVER FUNCTION TESTS: ICD-10-CM

## 2019-12-18 DIAGNOSIS — Z94.2 LUNG REPLACED BY TRANSPLANT (H): Primary | ICD-10-CM

## 2019-12-18 DIAGNOSIS — Z23 NEED FOR PNEUMOCOCCAL VACCINE: ICD-10-CM

## 2019-12-18 DIAGNOSIS — Z94.2 LUNG REPLACED BY TRANSPLANT (H): ICD-10-CM

## 2019-12-18 DIAGNOSIS — J86.9 EMPYEMA LUNG (H): ICD-10-CM

## 2019-12-18 LAB
ALBUMIN SERPL-MCNC: 3.3 G/DL (ref 3.4–5)
ALP SERPL-CCNC: 278 U/L (ref 40–150)
ALT SERPL W P-5'-P-CCNC: 18 U/L (ref 0–50)
ANION GAP SERPL CALCULATED.3IONS-SCNC: 4 MMOL/L (ref 3–14)
AST SERPL W P-5'-P-CCNC: 18 U/L (ref 0–45)
BASOPHILS # BLD AUTO: 0 10E9/L (ref 0–0.2)
BASOPHILS NFR BLD AUTO: 0.6 %
BILIRUB DIRECT SERPL-MCNC: 0.2 MG/DL (ref 0–0.2)
BILIRUB SERPL-MCNC: 0.5 MG/DL (ref 0.2–1.3)
BUN SERPL-MCNC: 21 MG/DL (ref 7–30)
CALCIUM SERPL-MCNC: 8 MG/DL (ref 8.5–10.1)
CHLORIDE SERPL-SCNC: 98 MMOL/L (ref 94–109)
CO2 SERPL-SCNC: 33 MMOL/L (ref 20–32)
CREAT SERPL-MCNC: 2.71 MG/DL (ref 0.52–1.04)
DIFFERENTIAL METHOD BLD: ABNORMAL
EOSINOPHIL # BLD AUTO: 0.1 10E9/L (ref 0–0.7)
EOSINOPHIL NFR BLD AUTO: 1.1 %
ERYTHROCYTE [DISTWIDTH] IN BLOOD BY AUTOMATED COUNT: 15.2 % (ref 10–15)
EXPTIME-PRE: 6.79 SEC
FEF2575-%PRED-PRE: 33 %
FEF2575-PRE: 0.79 L/SEC
FEF2575-PRED: 2.41 L/SEC
FEFMAX-%PRED-PRE: 39 %
FEFMAX-PRE: 2.52 L/SEC
FEFMAX-PRED: 6.45 L/SEC
FEV1-%PRED-PRE: 46 %
FEV1-PRE: 1.19 L
FEV1FEV6-PRE: 69 %
FEV1FEV6-PRED: 81 %
FEV1FVC-PRE: 69 %
FEV1FVC-PRED: 80 %
FIFMAX-PRE: 2.49 L/SEC
FVC-%PRED-PRE: 52 %
FVC-PRE: 1.73 L
FVC-PRED: 3.26 L
GFR SERPL CREATININE-BSD FRML MDRD: 19 ML/MIN/{1.73_M2}
GLUCOSE SERPL-MCNC: 111 MG/DL (ref 70–99)
HCT VFR BLD AUTO: 32.9 % (ref 35–47)
HGB BLD-MCNC: 10.1 G/DL (ref 11.7–15.7)
IMM GRANULOCYTES # BLD: 0 10E9/L (ref 0–0.4)
IMM GRANULOCYTES NFR BLD: 0.3 %
INR PPP: 1.13 (ref 0.86–1.14)
LYMPHOCYTES # BLD AUTO: 0.8 10E9/L (ref 0.8–5.3)
LYMPHOCYTES NFR BLD AUTO: 11.8 %
MAGNESIUM SERPL-MCNC: 1.4 MG/DL (ref 1.6–2.3)
MCH RBC QN AUTO: 30.5 PG (ref 26.5–33)
MCHC RBC AUTO-ENTMCNC: 30.7 G/DL (ref 31.5–36.5)
MCV RBC AUTO: 99 FL (ref 78–100)
MONOCYTES # BLD AUTO: 0.8 10E9/L (ref 0–1.3)
MONOCYTES NFR BLD AUTO: 12.3 %
NEUTROPHILS # BLD AUTO: 4.8 10E9/L (ref 1.6–8.3)
NEUTROPHILS NFR BLD AUTO: 73.9 %
NRBC # BLD AUTO: 0 10*3/UL
NRBC BLD AUTO-RTO: 0 /100
PLATELET # BLD AUTO: 86 10E9/L (ref 150–450)
POTASSIUM SERPL-SCNC: 3.2 MMOL/L (ref 3.4–5.3)
PROT SERPL-MCNC: 6.6 G/DL (ref 6.8–8.8)
RBC # BLD AUTO: 3.31 10E12/L (ref 3.8–5.2)
SODIUM SERPL-SCNC: 136 MMOL/L (ref 133–144)
TACROLIMUS BLD-MCNC: 6.9 UG/L (ref 5–15)
TME LAST DOSE: NORMAL H
WBC # BLD AUTO: 6.5 10E9/L (ref 4–11)

## 2019-12-18 PROCEDURE — 86833 HLA CLASS II HIGH DEFIN QUAL: CPT | Performed by: INTERNAL MEDICINE

## 2019-12-18 PROCEDURE — 86832 HLA CLASS I HIGH DEFIN QUAL: CPT | Performed by: INTERNAL MEDICINE

## 2019-12-18 PROCEDURE — 85610 PROTHROMBIN TIME: CPT | Performed by: INTERNAL MEDICINE

## 2019-12-18 PROCEDURE — 87486 CHLMYD PNEUM DNA AMP PROBE: CPT | Performed by: INTERNAL MEDICINE

## 2019-12-18 PROCEDURE — 36415 COLL VENOUS BLD VENIPUNCTURE: CPT | Performed by: INTERNAL MEDICINE

## 2019-12-18 PROCEDURE — G0009 ADMIN PNEUMOCOCCAL VACCINE: HCPCS | Mod: ZF

## 2019-12-18 PROCEDURE — 87799 DETECT AGENT NOS DNA QUANT: CPT | Performed by: INTERNAL MEDICINE

## 2019-12-18 PROCEDURE — 90732 PPSV23 VACC 2 YRS+ SUBQ/IM: CPT | Mod: ZF | Performed by: INTERNAL MEDICINE

## 2019-12-18 PROCEDURE — 25000128 H RX IP 250 OP 636: Mod: ZF | Performed by: INTERNAL MEDICINE

## 2019-12-18 PROCEDURE — 87633 RESP VIRUS 12-25 TARGETS: CPT | Performed by: INTERNAL MEDICINE

## 2019-12-18 PROCEDURE — 83735 ASSAY OF MAGNESIUM: CPT | Performed by: INTERNAL MEDICINE

## 2019-12-18 PROCEDURE — 80048 BASIC METABOLIC PNL TOTAL CA: CPT | Performed by: INTERNAL MEDICINE

## 2019-12-18 PROCEDURE — 80076 HEPATIC FUNCTION PANEL: CPT | Performed by: INTERNAL MEDICINE

## 2019-12-18 PROCEDURE — G0463 HOSPITAL OUTPT CLINIC VISIT: HCPCS | Mod: ZF

## 2019-12-18 PROCEDURE — G0463 HOSPITAL OUTPT CLINIC VISIT: HCPCS | Mod: 25,ZF

## 2019-12-18 PROCEDURE — 80197 ASSAY OF TACROLIMUS: CPT | Performed by: INTERNAL MEDICINE

## 2019-12-18 PROCEDURE — 85025 COMPLETE CBC W/AUTO DIFF WBC: CPT | Performed by: INTERNAL MEDICINE

## 2019-12-18 PROCEDURE — 80197 ASSAY OF TACROLIMUS: CPT | Performed by: PHYSICIAN ASSISTANT

## 2019-12-18 PROCEDURE — 87581 M.PNEUMON DNA AMP PROBE: CPT | Performed by: INTERNAL MEDICINE

## 2019-12-18 RX ORDER — CARVEDILOL 25 MG/1
25 TABLET ORAL 2 TIMES DAILY WITH MEALS
Qty: 60 TABLET | Refills: 11 | Status: SHIPPED | OUTPATIENT
Start: 2019-12-18 | End: 2020-12-17

## 2019-12-18 RX ORDER — TACROLIMUS 1 MG/1
1 CAPSULE ORAL 2 TIMES DAILY
Qty: 60 CAPSULE | Refills: 11 | Status: SHIPPED | OUTPATIENT
Start: 2019-12-18 | End: 2019-12-30

## 2019-12-18 RX ADMIN — PNEUMOCOCCAL VACCINE POLYVALENT 0.5 ML
25; 25; 25; 25; 25; 25; 25; 25; 25; 25; 25; 25; 25; 25; 25; 25; 25; 25; 25; 25; 25; 25; 25 INJECTION, SOLUTION INTRAMUSCULAR; SUBCUTANEOUS at 12:26

## 2019-12-18 ASSESSMENT — PAIN SCALES - GENERAL
PAINLEVEL: NO PAIN (0)

## 2019-12-18 ASSESSMENT — MIFFLIN-ST. JEOR: SCORE: 1170.56

## 2019-12-18 NOTE — NURSING NOTE
Verified patient's last name and  prior to administering vaccine. VIS form given. Pneumococcal 23 adminisetred per Dr. Mcelroy without  Complications.    NP viral swab also collected without complications.     Cher Ridley CMA CMA    2019 12:28 PM

## 2019-12-18 NOTE — NURSING NOTE
Chief Complaint   Patient presents with     RECHECK     S/P Lung Tx         /70   Pulse 89   Wt 60.1 kg (132 lb 6.4 oz)   LMP 06/07/2014 (Exact Date)   SpO2 97%   BMI 22.73 kg/m        Cher Ridley CMA CMA    12/18/2019 10:51 AM

## 2019-12-18 NOTE — LETTER
12/18/2019       RE: Kecia Blue  [address] [place] [address]     Dear Colleague,    Thank you for referring your patient, Kecia Blue, to the [facility] HEPATOLOGY at [facility]. Please see a copy of my visit note below.    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      While she was in the hospital, she was found to have ascites on imaging studies and physical exam, and underwent a diagnostic and therapeutic paracentesis.  Fluid analysis was consistent with portal hypertension with SAAG greater than 1.1 and ascitic fluid protein less than 2.5.  Since she was last seen in the hospital, she is symptomatically doing well and has underwent 2 therapeutic paracentesis since then.  She is currently being followed closely by the nephrology service, and he started dialysis for her acute kidney injury.  She denies any fevers or chills, headaches, however she does have some dyspnea on exertion.  She denies any abdominal pain, leg swelling, confusion or sleep-wake cycle disturbances, melena, hematochezia, or hematemesis. She has not seen a Cardiologist since discharge.     ROS: 10pt ROS performed and otherwise negative.    PERTINENT PAST MEDICAL/SURGICAL HISTORY:  Past Medical History:   Diagnosis Date     Antisynthetase syndrome (H) 2014     Chronic cough      Chronic infection     recent C diff  8/18     Dehydration 8/1/2018     Dermatomyositis (H)      Dysplasia of cervix, low grade (ESTRADA 1)       ILD (interstitial lung disease) (H)      Osteopenia      PONV (postoperative nausea and vomiting)      Pulmonary hypertension (H)      Raynaud's disease      Seronegative rheumatoid arthritis (H)       PERTINENT MEDICATIONS:   Current Outpatient Medications:      calcium-vitamin D (CALTRATE) 600-400 MG-UNIT per tablet, Take 1 tablet by mouth daily, Disp: , Rfl:      carvedilol (COREG) 25 MG tablet, Take 1 tablet (25 mg) by mouth 2 times daily (with meals), Disp: 60 tablet, Rfl: 0     dapsone (ACZONE) 100 MG tablet, Take 1 tablet (100 mg) by mouth daily, Disp: 30 tablet, Rfl: 11     ferrous sulfate (FEROSUL) 325 (65 Fe) MG tablet, Take 1 tablet (325 mg) by mouth daily (with breakfast), Disp: 60 tablet, Rfl: 2     magnesium oxide (MAG-OX) 400 MG tablet, Take 400 mg by mouth daily , Disp: , Rfl:      multivitamin, therapeutic with minerals (THERA-VIT-M) TABS tablet, Take 1 tablet by mouth daily, Disp: 30 each, Rfl: 11     ondansetron (ZOFRAN) 4 MG tablet, Take 1 tablet (4 mg) by mouth every 12 hours as needed for nausea, Disp: 60 tablet, Rfl: 0     pantoprazole (PROTONIX) 40 MG EC tablet, Take 1 tablet (40 mg) by mouth 2 times daily, Disp: 60 tablet, Rfl: 11     predniSONE (DELTASONE) 2.5 MG tablet, Take two tablets (5mg) every AM and one tablet (2.5mg) every evening, Disp: 90 tablet, Rfl: 11     senna-docusate (SENOKOT-S/PERICOLACE) 8.6-50 MG tablet, Take 2 tablets by mouth 2 times daily as needed for constipation, Disp: 60 tablet, Rfl: 0     tacrolimus (GENERIC EQUIVALENT) 0.5 MG capsule, Take 1 capsule (0.5 mg) by mouth 2 times daily, Disp: 60 capsule, Rfl: 11     tacrolimus (GENERIC EQUIVALENT) 1 MG capsule, Take 1 capsule (1 mg) by mouth every evening HOLD FOR DOSE CHANGES NOT CURRENTLY TAKING as of 12/5/19Total dose: 0.5 mg in the AM and 1 mg in the PM, Disp: 30 capsule, Rfl: 11     voriconazole (VFEND) 200 MG tablet, Take 1 tablet (200 mg) by mouth 2 times daily TOTAL DOSE: 250 mg every 12 hours, Disp: 60  "tablet, Rfl: 3     voriconazole (VFEND) 50 MG tablet, Take 1 tablet (50 mg) by mouth 2 times daily TOTAL DOSE: 250 mg every 12 hours, Disp: 60 tablet, Rfl: 3     PHYSICAL EXAMINATION:  Vitals reviewed  /70   Pulse 89   Ht 1.626 m (5' 4\")   Wt 60.1 kg (132 lb 6.4 oz)   LMP 06/07/2014 (Exact Date)   SpO2 97%   BMI 22.73 kg/m     Wt Readings from Last 2 Encounters:   12/18/19 60.1 kg (132 lb 6.4 oz)   11/11/19 58.8 kg (129 lb 11.2 oz)     Gen: aaox3, cooperative, pleasant,   HEENT: ncat, neck supple,    Resp/CV without acute findings   Abd: soft, distended  Ext: no c/c/e  Skin: warm, perfused, no jaundice  Neuro: grossly intact, no asterixis noted    PERTINENT STUDIES:  MELD-Na score: 18 at 12/18/2019  8:54 AM  MELD score: 17 at 12/18/2019  8:54 AM  Calculated from:  Serum Creatinine: 2.71 mg/dL at 12/18/2019  8:54 AM  Serum Sodium: 136 mmol/L at 12/18/2019  8:54 AM  Total Bilirubin: 0.5 mg/dL (Rounded to 1 mg/dL) at 12/18/2019  8:54 AM  INR(ratio): 1.13 at 12/18/2019  8:54 AM  Age: 59 years       ASSESSMENT/PLAN:  59 year old female with a medical history significant for hypertension, chronic kidney disease with 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      1. Portal hypertension:  As previously documented, ascitc fluid analysis was suggestive of portal hypertension.  Prior imaging studies have shown nodularity and coarse hepatic echotexture on ultrasound. While she was in the hospital she had testing done for autoimmune hepatitis and primary biliary cholangitis, which were all negative.  She has had negative hepatitis B and C testing.    Most likely differentials include chronic liver disease from chronic hepatic congestion from " severe right-sided heart dysfunction and pulmonary hypertension (echocardiogram from 2/2018 notes hypokinetic RV, with RVSP of ~89 mmHg; and RHC notes PA pressures of 61 mmHg) though improved on last echocardiogram; versus nodular regenerative hyperplasia based on its documented association with anti-synthetase syndrome and Raynaud's syndrome.  Elevated alkaline phosphatase may be related to poor excretion from her kidneys or may also signify NRH. NAFLD also considered.    Discussed that she will need cardiology follow-up with consideration for right heart catheterization.  She will also benefit from transjugular liver biopsy with measurement of portal pressures.  She does have significant portal hypertension, she may benefit from placement of a TIPS.  She currently does not have any evidence of synthetic dysfunction (INR 1.13), and if she does have cirrhosis, her MELD score is 17 (mainly driven by RADHA on CKD).    PLAN:  -- Transjugular liver biopsy with measurement of portal pressures  -- Cardiology consultation for consideration of RHC.      RTC 3 months, sooner if symptomatic.      The visit lasted up to 35 minutes, with more than half of the time spent on counseling and education.  All questions were answered to patient's satisfaction    Patient seen and discussed with staff GI physician, Dr. Denise, who agrees with my assessment and plan.      Vale Mas MD  Gastroenterology fellow  PGY 5  [phone]   Attestation:  This patient has been seen and evaluated by me, Feng Denise.  Discussed with the house staff team or resident(s) and agree with the findings and plan in this note.       Again, thank you for allowing me to participate in the care of your patient.      Sincerely,    Vale Mas MD

## 2019-12-18 NOTE — NURSING NOTE
Chief Complaint   Patient presents with     Hospital F/U     Blood pressure 113/78, pulse 101, weight 59.9 kg (132 lb), last menstrual period 06/07/2014, SpO2 95 %.    Scar Rojas/CARLTON  December 18, 2019 1:20 PM

## 2019-12-18 NOTE — TELEPHONE ENCOUNTER
Tacrolimus level 6.9 at 12 hours, on 12/18/19  Goal 8-10.   Current dose 0.5 mg in AM, 1 mg in PM    Dose changed to 1 mg in AM, 1 mg in PM   Recheck level in 7 days    Discussed with patient.

## 2019-12-18 NOTE — LETTER
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      While she was in the hospital, she was found to have ascites on imaging studies and physical exam, and underwent a diagnostic and therapeutic paracentesis.  Fluid analysis was consistent with portal hypertension with SAAG greater than 1.1 and ascitic fluid protein less than 2.5.  Since she was last seen in the hospital, she is symptomatically doing well and has underwent 2 therapeutic paracentesis since then.  She is currently being followed closely by the nephrology service, and he started dialysis for her acute kidney injury.  She denies any fevers or chills, headaches, however she does have some dyspnea on exertion.  She denies any abdominal pain, leg swelling, confusion or sleep-wake cycle disturbances, melena, hematochezia, or hematemesis. She has not seen a Cardiologist since discharge.     ROS: 10pt ROS performed and otherwise negative.    PERTINENT PAST MEDICAL/SURGICAL HISTORY:  Past Medical History:   Diagnosis Date     Antisynthetase syndrome (H) 2014     Chronic cough      Chronic infection     recent C diff  8/18     Dehydration 8/1/2018     Dermatomyositis (H)      Dysplasia of cervix, low grade (ESTRADA 1)      ILD (interstitial lung disease) (H)      Osteopenia      PONV (postoperative nausea and vomiting)      Pulmonary hypertension (H)      Raynaud's disease      Seronegative rheumatoid arthritis (H)        PERTINENT MEDICATIONS:   Current Outpatient Medications:      calcium-vitamin D (CALTRATE) 600-400 MG-UNIT per tablet, Take 1 tablet by mouth daily, Disp: , Rfl:      carvedilol (COREG) 25 MG tablet, Take 1 tablet (25 mg) by mouth 2 times daily (with meals), Disp: 60 tablet, Rfl: 0     dapsone (ACZONE) 100 MG tablet, Take 1 tablet (100 mg) by mouth daily, Disp: 30 tablet, Rfl: 11     ferrous sulfate (FEROSUL) 325 (65 Fe) MG tablet, Take 1 tablet (325 mg) by mouth daily (with breakfast), Disp: 60 tablet, Rfl: 2     magnesium oxide (MAG-OX) 400 MG tablet, Take 400 mg by mouth daily , Disp: , Rfl:      multivitamin, therapeutic with minerals (THERA-VIT-M) TABS tablet, Take 1 tablet by mouth daily, Disp: 30 each, Rfl: 11     ondansetron (ZOFRAN) 4 MG tablet, Take 1 tablet (4 mg) by mouth every 12 hours as needed for nausea, Disp: 60 tablet, Rfl: 0     pantoprazole (PROTONIX) 40 MG EC tablet, Take 1 tablet (40 mg) by mouth 2 times daily, Disp: 60 tablet, Rfl: 11     predniSONE (DELTASONE) 2.5 MG tablet, Take two tablets (5mg) every AM and one tablet (2.5mg) every evening, Disp: 90 tablet, Rfl: 11     senna-docusate (SENOKOT-S/PERICOLACE) 8.6-50 MG tablet, Take 2 tablets by mouth 2 times daily as needed for constipation, Disp: 60 tablet, Rfl: 0     tacrolimus (GENERIC EQUIVALENT) 0.5 MG capsule, Take 1 capsule (0.5 mg) by mouth 2 times daily, Disp: 60 capsule, Rfl: 11     tacrolimus (GENERIC EQUIVALENT) 1 MG capsule, Take 1 capsule (1 mg) by mouth every evening HOLD FOR DOSE CHANGES NOT CURRENTLY TAKING as of 12/5/19Total dose: 0.5 mg in the AM and 1 mg in the PM, Disp: 30 capsule, Rfl: 11     voriconazole (VFEND) 200 MG tablet, Take 1 tablet (200 mg) by mouth 2 times daily TOTAL DOSE: 250 mg every 12 hours, Disp: 60 tablet, Rfl: 3     voriconazole (VFEND) 50 MG tablet, Take 1 tablet (50 mg) by mouth 2 times daily TOTAL DOSE: 250 mg every 12 hours, Disp: 60 tablet, Rfl: 3     PHYSICAL EXAMINATION:  Vitals reviewed  BP  "108/70   Pulse 89   Ht 1.626 m (5' 4\")   Wt 60.1 kg (132 lb 6.4 oz)   LMP 06/07/2014 (Exact Date)   SpO2 97%   BMI 22.73 kg/m     Wt Readings from Last 2 Encounters:   12/18/19 60.1 kg (132 lb 6.4 oz)   11/11/19 58.8 kg (129 lb 11.2 oz)     Gen: aaox3, cooperative, pleasant,   HEENT: ncat, neck supple,    Resp/CV without acute findings   Abd: soft, distended  Ext: no c/c/e  Skin: warm, perfused, no jaundice  Neuro: grossly intact, no asterixis noted    PERTINENT STUDIES:  MELD-Na score: 18 at 12/18/2019  8:54 AM  MELD score: 17 at 12/18/2019  8:54 AM  Calculated from:  Serum Creatinine: 2.71 mg/dL at 12/18/2019  8:54 AM  Serum Sodium: 136 mmol/L at 12/18/2019  8:54 AM  Total Bilirubin: 0.5 mg/dL (Rounded to 1 mg/dL) at 12/18/2019  8:54 AM  INR(ratio): 1.13 at 12/18/2019  8:54 AM  Age: 59 years       ASSESSMENT/PLAN:  59 year old female with a medical history significant for hypertension, chronic kidney disease with 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      1. Portal hypertension:  As previously documented, ascitc fluid analysis was suggestive of portal hypertension.  Prior imaging studies have shown nodularity and coarse hepatic echotexture on ultrasound. While she was in the hospital she had testing done for autoimmune hepatitis and primary biliary cholangitis, which were all negative.  She has had negative hepatitis B and C testing.    Most likely differentials include chronic liver disease from chronic hepatic congestion from severe right-sided heart dysfunction and pulmonary hypertension (echocardiogram from 2/2018 notes hypokinetic RV, with RVSP of ~89 mmHg; and R[facility] notes PA pressures of 61 mmHg) though improved on last " echocardiogram; versus nodular regenerative hyperplasia based on its documented association with anti-synthetase syndrome and Raynaud's syndrome.  Elevated alkaline phosphatase may be related to poor excretion from her kidneys or may also signify NRH. NAFLD also considered.    Discussed that she will need cardiology follow-up with consideration for right heart catheterization.  She will also benefit from transjugular liver biopsy with measurement of portal pressures.  She does have significant portal hypertension, she may benefit from placement of a TIPS.  She currently does not have any evidence of synthetic dysfunction (INR 1.13), and if she does have cirrhosis, her MELD score is 17 (mainly driven by RADHA on CKD).    PLAN:  -- Transjugular liver biopsy with measurement of portal pressures  -- Cardiology consultation for consideration of RHC.      RTC 3 months, sooner if symptomatic.      The visit lasted up to 35 minutes, with more than half of the time spent on counseling and education.  All questions were answered to patient's satisfaction    Patient seen and discussed with staff GI physician, Dr. Denise, who agrees with my assessment and plan.      Vale Mas MD  Gastroenterology fellow  PGY 5  [phone]   Attestation:  This patient has been seen and evaluated by me, Feng Denise.  Discussed with the house staff team or resident(s) and agree with the findings and plan in this note.       Vale Mas MD

## 2019-12-18 NOTE — PROGRESS NOTES
[facility]  Infectious Disease Clinic Note:  Follow Up     Patient:  Kecia Blue, Date of birth 1960, Medical record number [id number]  Date of Visit:  12/18/2019         Assessment and Recommendations:   Recommendations:  - Continue voriconazole 250mg BID.  Acceptable levels (1.4) on 12/10.  - Repeat chest CT to evaluate status of pleural process.  May still need to consider surgical debridement if progression on repeat imaging but looks improved today.     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  This was found to be culture positive for Aspergillus.     Acute Infectious Disease issues include:  # L sided empyema with adjacent osteomyelitis of the 10th rib, with 10/8 pleural cultures growing Aspergillus fumigatus and BAL from 10/11 with cytology showing septate hyphae also consistent with Aspergillus:  After an unsuccessful attempt to access fluid under ultrasound guidance during last admission in September 2019, a CT-guided biopsy from 10/8/19 yielded purulent pleural fluid with Aspergillus fumigatus grown from cultures. A diagnosis of an invasive fungal process is also supported by positive and rising serum galactomannan and fungitell and septate hyphae seen on 10/11 BAL.  Notably, she has also grown Actinomyces from multiple BAL specimens in the past so we question whether a preceding Actinomyces infection could have played a contributory role; there was only a single colony on 10/11 BAL.Anticipate that this will require prolonged course of antifungal therapy.   Surgical debridement may also be needed to help with source control but after discussion with CT surgery and pulm, we decided on trial of medical therapy first. Repeat CT from 10/29 looked worse but improvement on imaging today.     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  Previously not associated with a clear clinical syndrome and was not specifically treated, though she did received shorter courses of Augmentin for Moraxella that would be anticipated to have activity against Actinomyces, though the duration was shorter than would be needed for true infection.  She has had improvement despite no additional Actinomyces treatment.     # Low level CMV viremia   Peak viral load >3M in 8/2018.  Was on ganciclovir until 7/25/19. Currently biweekly monitoring of low level CMV viremia. Patient is asymptomatic. CMV not detected on 10/6/19.     Additional/Chronic Infectious Disease issues include:   - PCP prophylaxis: dapsone (switched from bactrim due to RADHA).  - Serostatus: CMV D+/R+, EBV D+/R+.  No antivirals  - Gamma globulin status: 698 on 3/1/2018  - Isolation status:  Good hand hygiene.    Follow up scheduled for 2 months.    Patient discussed with Dr. Patel.    I spent 45 minutes in direct care with this patient, including >50% of time face-to-face with the patient counseling about aspergillus management.    Nancy Aguilera MD, PhD  Adult & Pediatric Infectious Diseases Fellow PGY8, [facility]  Pager: [phone]           History of the Infectious Disease lllness:   Kecia lBue is a 60 yo woman with history anti-synthetase/dermatomyositis who is s/p bilateral lung transplant (3/1/18) for ILD, course complicated by R main bronchial stenosis requiring repeated dilation, positive DSAs, CMV viremia s/p valganciclovir with undetectable viral  loads off therapy who was re-admitted on 10/6/19 for diagnostic evaluation of 3 months of left chest/upper abdominal pain with evidence of left empyema on imaging.  ID was consulted on 10/10/19 for positive fungal culture from pleural fluid.     She was recently admitted at [facility] for same complaint from 9/11-9/15/19.   At the time of that admission, she had complained of left sided upper abdominal / chest pain for several months. It became intolerably worse the prior week, prompting her to present to the ED. Initially imaging at outside facility was concerning for biliary pathology and she was transferred to [facility]. Initial concern was for cholecystitis / choledocholithiasis and evaluated by GI and surgery, however after additional diagnostics she was found to have ~4cm/2.5cm empyema/abscess. She was afebrile and hemodynamically stable on admission. She received a brief course of pip-tazo from 9/12/19-9/13/19. Sampling fluid was attempted on 9/13/19 but no fluid was obtained.  She was discharged on levofloxacin to cover Stenotrophomonas from sputum.  This was changed to bactrim DS BID on 9/17 to completed a 14d course.     She describes initially feeling better but by 10/5-10/6 began having worsening cough and pain and was readmitted on 10/6.  She has not received additional animicrobials during her stay except for her prophylactic bactrim.  On 10/8, the fluid was aspirated and yielded 4 ml of purulent fluid.  Based on limited volume, only a subset of tests could be performed.  This included a fungal culture, which grew Aspergillus fumigatus on 10/10.  She was minimally symptomatic at that time.  During her hospital stay, she was evaluated by CT surgery and after considering risks and benefits of proceeding with surgery immediately vs. Trial of medical therapy first, decided to proceed with trial of voriconazole, which was started on 10/10.  Close outpatient ID follow up was planned.    Unfortunately, following her  initial consultation, she was re-admitted from 10/21-10/29 with oliguric renal failure requiring initiation of dialysis and new onset ascites.  IgG, F-actin, AMA checked to evaluate for autoimmune cause and negative.  Cytology of ascites also negative.  Liver failure was thought most likely secondary to right heart failure from lung disease.  Planning for liver biopsy and right  Heart cath in near future.    Currently feeling ok.  Side pain at initial presentation in October resolved right after starting vorinazole.  Started on 200mg BID and now 250mg (increased late October).  Last vonriconazole level was 1.4 on 12/10/10. Chronic cough unchanged from hospital. No bronchoscopy since hospital.   More short of breath that seems to correlate with volume status.  No chest pain per se but fullness. Has had paracentesis x4 and feels fullness improves with that.     Dialysis at [facility] in [place].  Dialysis catheter looks ok. Off dialysis for a time between Thanksgiving and 12/10.  Not urinating a lot.  Always cold but no fevers.  Appetite ok.  Alternative diarrhea, regular stool and constipation.   No visual side effects.    Transplants:  3/1/2018 (Lung); Postoperative day:  657.  Coordinator Mikayla Latham  IS: tracrolimus, prednisone 7.5mg daily (no MMF)    Review of Systems:  Patient's electronically submitted complete ROS reviewed and addressed.    Past Medical History:   Diagnosis Date     Antisynthetase syndrome (H) 2014     Chronic cough      Chronic infection     recent C diff  8/18     Dehydration 8/1/2018     Dermatomyositis (H)      Dysplasia of cervix, low grade (ESTRADA 1)      ILD (interstitial lung disease) (H)      Osteopenia      PONV (postoperative nausea and vomiting)      Pulmonary hypertension (H)      Raynaud's disease      Seronegative rheumatoid arthritis (H)        Past Surgical History:   Procedure Laterality Date     BRONCHOSCOPY (RIGID OR FLEXIBLE), DIAGNOSTIC N/A 4/10/2018    Procedure:  COMBINED BRONCHOSCOPY (RIGID OR FLEXIBLE), LAVAGE;;  Surgeon: Mariposa Donohue MD;  Location: [facility]     BRONCHOSCOPY (RIGID OR FLEXIBLE), DILATE BRONCHUS / TRACHEA N/A 10/11/2018    Procedure: BRONCHOSCOPY (RIGID OR FLEXIBLE), DILATE BRONCHUS / TRACHEA;  Flexible And Rigid Bronchoscopy and Dilation;  Surgeon: Wilber Lin MD;  Location: [facility]     BRONCHOSCOPY FLEXIBLE N/A 3/13/2018    Procedure: BRONCHOSCOPY FLEXIBLE;  Flexible Bronchoscopy ;  Surgeon: Gissell Sanchez MD;  Location: [facility]     BRONCHOSCOPY FLEXIBLE N/A 5/9/2018    Procedure: BRONCHOSCOPY FLEXIBLE;;  Surgeon: Wilber Lin MD;  Location: [facility]     BRONCHOSCOPY FLEXIBLE AND RIGID N/A 9/10/2018    Procedure: BRONCHOSCOPY FLEXIBLE AND RIGID;  Flexible and Rigid Bronchoscopy with Balloon Dilation, tissue debulking with cryo, and Right mainstem bronchus stent placement;  Surgeon: Wilber Lin MD;  Location: [facility]     BRONCHOSCOPY RIGID N/A 6/6/2018    Procedure: BRONCHOSCOPY RIGID;;  Surgeon: Lopez Macias MD;  Location: [facility]     BRONCHOSCOPY, DILATE BRONCHUS, STENT BRONCHUS, COMBINED N/A 6/11/2018    Procedure: COMBINED BRONCHOSCOPY, DILATE BRONCHUS, STENT BRONCHUS;  Flexible Bronchoscopy, Balloon Dilation, Bronchial Washings;  Surgeon: Wilber Lin MD;  Location: [facility]     BRONCHOSCOPY, DILATE BRONCHUS, STENT BRONCHUS, COMBINED Right 7/10/2018    Procedure: COMBINED BRONCHOSCOPY, DILATE BRONCHUS, STENT BRONCHUS;  Flexible Bronchoscopy, Balloon Dilation, Bronchial Washings  ;  Surgeon: Wilber Lin MD;  Location: [facility]     BRONCHOSCOPY, DILATE BRONCHUS, STENT BRONCHUS, COMBINED N/A 8/2/2018    Procedure: COMBINED BRONCHOSCOPY, DILATE BRONCHUS, STENT BRONCHUS;  Flexible Bronchoscopy, Bronchial Washings, Balloon Dilation;  Surgeon: Wilber Lin MD;  [facility]: [facility]     BRONCHOSCOPY, DILATE BRONCHUS, STENT BRONCHUS, COMBINED N/A 8/20/2018    Procedure: COMBINED BRONCHOSCOPY,  DILATE BRONCHUS, STENT BRONCHUS;  Flexible Bronchoscopy, Balloon Dilation;  Surgeon: Wilber Lin MD;  [facility]: [facility]     BRONCHOSCOPY, DILATE BRONCHUS, STENT BRONCHUS, COMBINED N/A 10/29/2018    Procedure: Flexible Bronchoscopy, Balloon Dilation, Stent Revision, Airway Examination And Therapeutic Suctioning, Cyro Tumor Debulking;  Surgeon: Wilber Lin MD;  [facility]     BRONCHOSCOPY, DILATE BRONCHUS, STENT BRONCHUS, COMBINED N/A 11/20/2018    Procedure: Rigid Bronchoscopy, Stent Removal and dilitation;  Surgeon: Wilber Lin MD;  [facility]: [facility]     BRONCHOSCOPY, DILATE BRONCHUS, STENT BRONCHUS, COMBINED N/A 12/14/2018    Procedure: Flexible And Rigid Bronchoscopy, Balloon Dilation, Bronchial Washing;  Surgeon: Wilber Lin MD;  [facility]: [facility]     BRONCHOSCOPY, DILATE BRONCHUS, STENT BRONCHUS, COMBINED N/A 1/17/2019    Procedure: Flexible And Rigid Bronchoscopy, Balloon Dilation.;  Surgeon: Wilber Lin MD;  [facility]     BRONCHOSCOPY, DILATE BRONCHUS, STENT BRONCHUS, COMBINED N/A 3/7/2019    Procedure: Flexible and Rigid Bronchoscopy, Bronchial Washing, Balloon Dilation;  Surgeon: Wilber Lin MD;  [facility]     BRONCHOSCOPY, DILATE BRONCHUS, STENT BRONCHUS, COMBINED N/A 6/6/2019    Procedure: Rigid and Flexible Bronchoscopy, Balloon Dilation;  Surgeon: Wilber Lin MD;  [facility]     BRONCHOSCOPY, DILATE BRONCHUS, STENT BRONCHUS, COMBINED N/A 10/11/2019    Procedure: Flexible and Rigid Bronchoscopy, Balloon Dilation, Bronchoalveolar Lagave;  Surgeon: Wilber Lin MD;  Location: [facility]     ENT SURGERY      tonsillectomy as a child     ESOPHAGOSCOPY, GASTROSCOPY, DUODENOSCOPY (EGD), COMBINED N/A 10/29/2018    Procedure: COMBINED ESOPHAGOSCOPY, GASTROSCOPY, DUODENOSCOPY (EGD) with biopsies and polypectomy;  Surgeon: Chente Bloom MD;  Location[facility]     INSERT EXTRACORPORAL MEMBRANE OXYGENATOR ADULT  (OUTSIDE OR) N/A 2018    Procedure: INSERT EXTRACORPORAL MEMBRANE OXYGENATOR ADULT (OUTSIDE OR);  INSERT EXTRACORPORAL MEMBRANE OXYGENATOR ADULT (OUTSIDE OR) ;  Surgeon: Toby Hernandez MD;  Location: [facility]     IR CVC TUNNEL PLACEMENT > 5 YRS OF AGE  10/25/2019     IR THORACENTESIS  2019     no prior surgery       REMOVE EXTRACORPORAL MEMBRANE OXYGENATOR ADULT N/A 3/3/2018    Procedure: REMOVE EXTRACORPORAL MEMBRANE OXYGENATOR ADULT;  Removal of Right Femoral Venous and Right Axillary Arterial Extracorporeal Membrane Oxygenator;  Surgeon: Toby Hernandez MD;  Location: [facility]     TRANSPLANT LUNG RECIPIENT SINGLE X2 Bilateral 3/1/2018    Procedure: TRANSPLANT LUNG RECIPIENT SINGLE X2;  Median Sternotomy, Extracorporeal Membrane Oxygenator, bilateral sequential lung transplant;  Surgeon: Toby Hernandez MD;  Location: [facility]       Family History   Problem Relation Age of Onset     Hypertension Mother      Arthritis Mother      Pancreatic Cancer Father      Diabetes Father        Social History     Social History Narrative    3/6/2019 - Lives with . Has three daughters. Four grandchildren (two ). No pets. Travelled previously to [facility]. Has visited [place] several times.      Social History     Tobacco Use     Smoking status: Never Smoker     Smokeless tobacco: Never Used   Substance Use Topics     Alcohol use: No     Alcohol/week: 1.0 standard drinks     Types: 1 Glasses of wine per week     Drug use: No       Immunization History   Administered Date(s) Administered     Influenza Quad, Recombinant, p-free (RIV4) 10/09/2019     Influenza Vaccine IM > 6 months Valent IIV4 10/29/2018     Pneumo Conj 13-V (2010&after) 2016     Pneumococcal 23 valent 2014, 2019     TDAP Vaccine (Boostrix) 2018     Tdap (Adult) Unspecified Formulation 2008     Zoster vaccine recombinant adjuvanted (SHINGRIX) 2019, 2019       Patient  Active Problem List   Diagnosis     Acute on chronic respiratory failure with hypoxia (H)     ILD (interstitial lung disease) (H)     S/P lung transplant (H)     Steroid-induced hyperglycemia     Deep vein thrombosis (DVT) (H) [I82.409]     Encounter for long-term (current) use of high-risk medication     Dehydration     Acute kidney injury (H)     Cytomegalovirus (CMV) viremia (H)     Nausea and vomiting     Low hemoglobin     Cholecystitis     Empyema of lung (H)     Administration of long-term prophylactic antibiotics     Abdominal pain     RADHA (acute kidney injury) (H)       Current Outpatient Medications   Medication Sig     calcium-vitamin D (CALTRATE) 600-400 MG-UNIT per tablet Take 1 tablet by mouth daily     carvedilol (COREG) 25 MG tablet Take 1 tablet (25 mg) by mouth 2 times daily (with meals)     dapsone (ACZONE) 100 MG tablet Take 1 tablet (100 mg) by mouth daily     ferrous sulfate (FEROSUL) 325 (65 Fe) MG tablet Take 1 tablet (325 mg) by mouth daily (with breakfast)     magnesium oxide (MAG-OX) 400 MG tablet Take 400 mg by mouth daily      multivitamin, therapeutic with minerals (THERA-VIT-M) TABS tablet Take 1 tablet by mouth daily     ondansetron (ZOFRAN) 4 MG tablet Take 1 tablet (4 mg) by mouth every 12 hours as needed for nausea     pantoprazole (PROTONIX) 40 MG EC tablet Take 1 tablet (40 mg) by mouth 2 times daily     predniSONE (DELTASONE) 2.5 MG tablet Take two tablets (5mg) every AM and one tablet (2.5mg) every evening     senna-docusate (SENOKOT-S/PERICOLACE) 8.6-50 MG tablet Take 2 tablets by mouth 2 times daily as needed for constipation     tacrolimus (GENERIC EQUIVALENT) 0.5 MG capsule Take 1 capsule (0.5 mg) by mouth 2 times daily     tacrolimus (GENERIC EQUIVALENT) 1 MG capsule Take 1 capsule (1 mg) by mouth every evening HOLD FOR DOSE CHANGES NOT CURRENTLY TAKING as of 12/5/19Total dose: 0.5 mg in the AM and 1 mg in the PM     voriconazole (VFEND) 200 MG tablet Take 1 tablet (200  mg) by mouth 2 times daily TOTAL DOSE: 250 mg every 12 hours     voriconazole (VFEND) 50 MG tablet Take 1 tablet (50 mg) by mouth 2 times daily TOTAL DOSE: 250 mg every 12 hours     No current facility-administered medications for this visit.        No Known Allergies           Physical Exam:   Vitals were reviewed.  All vitals stable  /78   Pulse 101   Wt 59.9 kg (132 lb)   LMP 06/07/2014 (Exact Date)   SpO2 95%   BMI 22.66 kg/m      Exam:  GENERAL:  well-developed, well-nourished, alert, oriented, in no acute distress.  HEENT:  Head is normocephalic, atraumatic   EYES:  Eyes have anicteric sclerae.    ENT:  Oropharynx is moist without exudates or ulcers.  NECK:  Supple.  LUNGS:  Clear to auscultation.  CARDIOVASCULAR:  Regular rate and rhythm with no murmurs, gallops or rubs.  ABDOMEN:  Normal bowel sounds, soft but distended.  SKIN:  No acute rashes.  Dialysis catheter right chest is in place without any surrounding erythema.  NEUROLOGIC:  Grossly nonfocal.         Laboratory Data:   ALT 18  AST 18  Tbili 0.5  K 3.2  Plt 86  WBC 6.5  Tacro 6.9      Microbiology     CMV PCR               8/2/18 [id number]               10/29/18 7070               1/17/19 undetectable               3/7/19 494               6/5/19 <137               9/12/19 <137               10/6/19 undetectable   11/11/19 <137    EBV PCR   10/24/19 13,391   11/11/19 9669   12/18/19 66942     Fungal ab               9/14/19 negative for Cocci, histoplasma     13BD-glucan               9/14/19 122               10/7/19 269     Galactomannan               9/14/19 1.08               10/7/19 1.13     Histoplasma antigen               9/14/19 Urine/serum negative     Blastomyces antigen               9/14/19 serum negative     Serum CrAg               9/14/19 negative     Quantiferon               9/14/19 negative     Blood               9/11/19 negative               9/12/19 negative     Pleural               9/13/19 unsuccessful in obtaining  fluid               10/8/19 Aspergillus fumigatus     Bronchial               7/10/18 Actinomyces species                18 Gardnerella vaginalis, normal alo, On day 7, isolated in broth only Actinomyces species                10/29/18 Moraxella (Branhamella) catarrhalis, normal alo, no Actinomyces               18 Moraxella (Branhamella) catarrhalis, Single colony  Acinetobacter species, not baumannii,  normal alo, no Actinomyces               18 Moraxella (Branhamella) catarrhalis, Candida glabrata, Actinomyces odontolyticus                18 normal alo, Actinomyces odontolyticus               3/7/17 normal alo, No Actinomyces                10/11/19 BAL cultures pending.  Cytology with branching septate hyphae (suggestive of Aspergillus) present on GMS stain.   No components found for: [id number]    Pathology:      Imagin/18/19 CT chest  1. Decreased size of the small left basilar empyema, which contains a  few new foci of air. This could represent sequelae of infection or  recent intervention. Difficult to exclude bronchopleural fistula  although no fistulous communication is visualized.  2. Trace pleural effusions. Partially imaged small to moderate volume  of ascites.  3. Cholelithiasis.  4. Retained ingested material throughout the esophagus, which is  suggestive of dysmotility and may place the patient at risk for  aspiration.      Answers for HPI/ROS submitted by the patient on 2019   General Symptoms: Yes  Skin Symptoms: No  HENT Symptoms: Yes  EYE SYMPTOMS: No  HEART SYMPTOMS: No  LUNG SYMPTOMS: Yes  INTESTINAL SYMPTOMS: Yes  URINARY SYMPTOMS: No  GYNECOLOGIC SYMPTOMS: No  BREAST SYMPTOMS: No  SKELETAL SYMPTOMS: No  BLOOD SYMPTOMS: No  NERVOUS SYSTEM SYMPTOMS: No  MENTAL HEALTH SYMPTOMS: No  Fever: No  Loss of appetite: No  Weight loss: No  Weight gain: Yes  Fatigue: Yes  Night sweats: No  Chills: Yes  Increased stress: No  Excessive hunger: No  Excessive thirst:  No  Feeling hot or cold when others believe the temperature is normal: Yes  Loss of height: No  Surgical site pain: No  Hallucinations: No  Change in or Loss of Energy: No  Hyperactivity: No  Confusion: No  Ear pain: No  Ear discharge: No  Hearing loss: No  Tinnitus: No  Nosebleeds: No  Congestion: No  Sinus pain: No  Trouble swallowing: No   Voice hoarseness: No  Mouth sores: No  Sore throat: No  Tooth pain: No  Gum tenderness: No  Bleeding gums: No  Change in taste: No  Change in sense of smell: No  Dry mouth: No  Hearing aid used: No  Neck lump: No  Cough: Yes  Sputum or phlegm: Yes  Coughing up blood: No  Difficulty breating or shortness of breath: Yes  Snoring: No  Wheezing: Yes  Difficulty breathing on exertion: Yes  Nighttime Cough: No  Difficulty breathing when lying flat: No  Heart burn or indigestion: No  Nausea: No  Vomiting: No  Abdominal pain: No  Bloating: Yes  Constipation: No  Diarrhea: Yes  Blood in stool: No  Black stools: No  Rectal or Anal pain: No  Fecal incontinence: No  Yellowing of skin or eyes: No  Vomit with blood: No  Change in stools: No

## 2019-12-18 NOTE — LETTER
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  Her posttransplant course has been significantly complicated as described below.     1.  History of bilateral sequential lung transplant for ILD: The patient is overall doing well from respiratory standpoint.  She has had significant decline in her lung function which is likely mechanical due to large ascites.  Her chest imaging is unremarkable.  She does not have any significant respiratory symptoms.  She is currently on 2 drug immunosuppression including tacrolimus with a goal of 8 to 10 ng/mL and prednisone 7.5 mg daily.  Her azathioprine is currently being held for leukopenia.  Her white count is low normal today.  We will continue to hold Imuran at this point.  If her  white count remains consistently normal, will consider restarting it.  Her last PRA in August was negative.  We will recheck it during the next visit.  I anticipate that her lung function will improve once her ascites is relieved.     2.  Infectious disease: We will restart dapsone for PCP prophylaxis.  Her G6PD level is normal.  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  She will follow-up with transplant infectious disease at that time.  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  Her EBV level was recently noted to be minimally elevated, we will recheck it today.     3.  Acute renal failure: Likely related to acute calcineurin inhibitor toxicity.  The patient appears to be  "starting to make urine so anticipate possible renal recovery.  She continues to be on dialysis 3 times a week.  We will follow-up with nephrology.     4.  New onset ascites with portal hypertension: The patient has persistent elevation of LFTs consistent with hepatocellular injury pattern.  Immunologic testing for causes of liver disease is unremarkable.  Patient is scheduled to follow-up with hepatology approximately 1 month from now.  We will reach out to them for any further work-up and sooner clinic follow-up given rising GGT.     5.  Hypertension: Blood pressure remains elevated but the patient has not taken her morning medications today.  She will continue amlodipine, carvedilol and Imdur.     Patient will return to clinic in approximately 1 month with repeat PFTs, imaging, labs.  She will follow-up with transplant infectious disease and hepatology in the interim.  Please also refer to RN Transplant Coordinator note for additional information related to this visit.  PATIENT PROFILE AND TRANSPLANT HISTORY:  Current age:                    59 year old  Underlying lung disease: IIP: Nonspecific Interstitial Pneumonia    Transplant date(s):        3/1/2018 (Lung)  Transplant POD(s):        655  Lung transplant type(s):       Transplant coordinator:   Mikayla Latham  Transplant provider(s):        Ame Servin, Toby Hernandez, Nicho Hood    Active Patient Thresholds     Lab Low High Effective Since Comment    Tacrolimus Level 8 10 06/06/2019 6/6/19 CP          INTERVAL HISTORY:  --Most recent resulted PRA:  --Most recent bronchoscopy:  --Most recent Tbbx:    --Most recent lung transplant clinic visit: 11/11/19 ([facility])    --Interval clinic visits, test results, signif medical events (obtained by chart review and patient hx):    12/3/19: Off dialysis since ~Thanksgiving. Started on bicarbonate form HCO3 17    12/9/19: Pt called coordinator: \"Patient calls to say she's short of " "breath and has the chills. States she had a paracentesis last week where they took off 4.7L and \"it's now all back\". Patient sees nephrologist this afternoon and will call back with updates\"    --Today:  ***    REVIEW OF SYSTEMS:  ***    PHYSICAL EXAM:  There were no vitals filed for this visit.  Constitutional:    Awake, alert and in no apparent distress   Eyes:    Anicteric, PERRL   ENT:    oral mucosa moist without lesions    Neck:    Supple without supraclavicular or cervical lymphadenopathy    Lungs:    Good air entry both lungs. No crackles. No rhonchi. No wheezes.    Cardiovascular:    Normal S1 and S2. No JVD, murmur, rub, chavez.   Abdomen:    NABS, soft, nontender, nondistended. No HSM.    Musculoskeletal:    No edema.    Neurologic:    Alert and conversant.    Skin:    Warm, dry. No rash seen.          Data:     I reviewed all resulted lab tests and imaging studies.    No results found for any visits on 12/18/19.    PULMONARY FUNCTION TEST INTERPRETATION:  ***    SIX-MINUTE WALK TEST INTERPRETATION:  ***    STUDIES STILL PENDING AT THE TIME OF THIS NOTE:  Unresulted Labs Ordered in the Past 30 Days of this Admission     No orders found from 11/18/2019 to 12/19/2019.          Complexity indicators:    --immune compromised, on high-risk medications x   --organ transplant recipient x   --multiple organ transplant recipient    --active respiratory infection    --within one year of transplant; and/or within one month of hospitalization    --chronic lung allograft dysfunction syndrome (CLAD, chronic rejection, or bronchiolitis obliterans syndrome)    --new medical problem addressed during this visit    --multiple active medical problems    --admitted directly to hospital from this clinic visit    -->50% of this visit was spent in counseling and care coordination. If yes, total visit time was              Medications:     Current Outpatient Medications   Medication     calcium-vitamin D (CALTRATE) 600-400 " MG-UNIT per tablet     carvedilol (COREG) 25 MG tablet     dapsone (ACZONE) 100 MG tablet     ferrous sulfate (FEROSUL) 325 (65 Fe) MG tablet     magnesium oxide (MAG-OX) 400 MG tablet     multivitamin, therapeutic with minerals (THERA-VIT-M) TABS tablet     ondansetron (ZOFRAN) 4 MG tablet     pantoprazole (PROTONIX) 40 MG EC tablet     predniSONE (DELTASONE) 2.5 MG tablet     senna-docusate (SENOKOT-S/PERICOLACE) 8.6-50 MG tablet     tacrolimus (GENERIC EQUIVALENT) 0.5 MG capsule     tacrolimus (GENERIC EQUIVALENT) 1 MG capsule     voriconazole (VFEND) 200 MG tablet     voriconazole (VFEND) 50 MG tablet     No current facility-administered medications for this visit.             Past Medical and Surgical History:     Past Medical History:   Diagnosis Date     Antisynthetase syndrome (H) 2014     Chronic cough      Chronic infection     recent C diff  8/18     Dehydration 8/1/2018     Dermatomyositis (H)      Dysplasia of cervix, low grade (ESTRADA 1)      ILD (interstitial lung disease) (H)      Osteopenia      PONV (postoperative nausea and vomiting)      Pulmonary hypertension (H)      Raynaud's disease      Seronegative rheumatoid arthritis (H)      Past Surgical History:   Procedure Laterality Date     BRONCHOSCOPY (RIGID OR FLEXIBLE), DIAGNOSTIC N/A 4/10/2018    Procedure: COMBINED BRONCHOSCOPY (RIGID OR FLEXIBLE), LAVAGE;;  Surgeon: Mariposa Donohue MD;  Location:  GI     BRONCHOSCOPY (RIGID OR FLEXIBLE), DILATE BRONCHUS / TRACHEA N/A 10/11/2018    Procedure: BRONCHOSCOPY (RIGID OR FLEXIBLE), DILATE BRONCHUS / TRACHEA;  Flexible And Rigid Bronchoscopy and Dilation;  Surgeon: Wilber Lin MD;  [facility]     BRONCHOSCOPY FLEXIBLE N/A 3/13/2018    Procedure: BRONCHOSCOPY FLEXIBLE;  Flexible Bronchoscopy ;  Surgeon: Gissell Sanchez MD;  Location:  GI     BRONCHOSCOPY FLEXIBLE N/A 5/9/2018    Procedure: BRONCHOSCOPY FLEXIBLE;;  Surgeon: Wilber Lin MD;  Location:  GI      BRONCHOSCOPY FLEXIBLE AND RIGID N/A 9/10/2018    Procedure: BRONCHOSCOPY FLEXIBLE AND RIGID;  Flexible and Rigid Bronchoscopy with Balloon Dilation, tissue debulking with cryo, and Right mainstem bronchus stent placement;  Surgeon: Wilber Lin MD;  [facility]     BRONCHOSCOPY RIGID N/A 6/6/2018    Procedure: BRONCHOSCOPY RIGID;;  Surgeon: Lopez Macias MD;  [facility]     BRONCHOSCOPY, DILATE BRONCHUS, STENT BRONCHUS, COMBINED N/A 6/11/2018    Procedure: COMBINED BRONCHOSCOPY, DILATE BRONCHUS, STENT BRONCHUS;  Flexible Bronchoscopy, Balloon Dilation, Bronchial Washings;  Surgeon: Wilber Lin MD;  [facility]     BRONCHOSCOPY, DILATE BRONCHUS, STENT BRONCHUS, COMBINED Right 7/10/2018    Procedure: COMBINED BRONCHOSCOPY, DILATE BRONCHUS, STENT BRONCHUS;  Flexible Bronchoscopy, Balloon Dilation, Bronchial Washings  ;  Surgeon: Wilber Lin MD;  [facility]     BRONCHOSCOPY, DILATE BRONCHUS, STENT BRONCHUS, COMBINED N/A 8/2/2018    Procedure: COMBINED BRONCHOSCOPY, DILATE BRONCHUS, STENT BRONCHUS;  Flexible Bronchoscopy, Bronchial Washings, Balloon Dilation;  Surgeon: Wilber Lin MD;  [facility]     BRONCHOSCOPY, DILATE BRONCHUS, STENT BRONCHUS, COMBINED N/A 8/20/2018    Procedure: COMBINED BRONCHOSCOPY, DILATE BRONCHUS, STENT BRONCHUS;  Flexible Bronchoscopy, Balloon Dilation;  Surgeon: Wilber Lin MD;  [facility]     BRONCHOSCOPY, DILATE BRONCHUS, STENT BRONCHUS, COMBINED N/A 10/29/2018    Procedure: Flexible Bronchoscopy, Balloon Dilation, Stent Revision, Airway Examination And Therapeutic Suctioning, Cyro Tumor Debulking;  Surgeon: Wilber Lin MD;  [facility]     BRONCHOSCOPY, DILATE BRONCHUS, STENT BRONCHUS, COMBINED N/A 11/20/2018    Procedure: Rigid Bronchoscopy, Stent Removal and dilitation;  Surgeon: Wilber Lin MD;  [facility]     BRONCHOSCOPY, DILATE BRONCHUS, STENT BRONCHUS, COMBINED  N/A 12/14/2018    Procedure: Flexible And Rigid Bronchoscopy, Balloon Dilation, Bronchial Washing;  Surgeon: Wilber Lin MD;  [facility]     BRONCHOSCOPY, DILATE BRONCHUS, STENT BRONCHUS, COMBINED N/A 1/17/2019    Procedure: Flexible And Rigid Bronchoscopy, Balloon Dilation.;  Surgeon: Wilber Lin MD;  [facility]     BRONCHOSCOPY, DILATE BRONCHUS, STENT BRONCHUS, COMBINED N/A 3/7/2019    Procedure: Flexible and Rigid Bronchoscopy, Bronchial Washing, Balloon Dilation;  Surgeon: Wilber Lin MD;  [facility]     BRONCHOSCOPY, DILATE BRONCHUS, STENT BRONCHUS, COMBINED N/A 6/6/2019    Procedure: Rigid and Flexible Bronchoscopy, Balloon Dilation;  Surgeon: Wilber Lin MD;  [facility]     BRONCHOSCOPY, DILATE BRONCHUS, STENT BRONCHUS, COMBINED N/A 10/11/2019    Procedure: Flexible and Rigid Bronchoscopy, Balloon Dilation, Bronchoalveolar Lagave;  Surgeon: Wilber Lin MD;  [facility]     ENT SURGERY      tonsillectomy as a child     ESOPHAGOSCOPY, GASTROSCOPY, DUODENOSCOPY (EGD), COMBINED N/A 10/29/2018    Procedure: COMBINED ESOPHAGOSCOPY, GASTROSCOPY, DUODENOSCOPY (EGD) with biopsies and polypectomy;  Surgeon: Chente Bloom MD;  [facility]     INSERT EXTRACORPORAL MEMBRANE OXYGENATOR ADULT (OUTSIDE OR) N/A 2/27/2018    Procedure: INSERT EXTRACORPORAL MEMBRANE OXYGENATOR ADULT (OUTSIDE OR);  INSERT EXTRACORPORAL MEMBRANE OXYGENATOR ADULT (OUTSIDE OR) ;  Surgeon: Toby Hernandez MD;  [facility]     IR CVC TUNNEL PLACEMENT > 5 YRS OF AGE  10/25/2019     IR THORACENTESIS  9/13/2019     no prior surgery       REMOVE EXTRACORPORAL MEMBRANE OXYGENATOR ADULT N/A 3/3/2018    Procedure: REMOVE EXTRACORPORAL MEMBRANE OXYGENATOR ADULT;  Removal of Right Femoral Venous and Right Axillary Arterial Extracorporeal Membrane Oxygenator;  Surgeon: Toby Hernandez MD;  [facility]     TRANSPLANT LUNG RECIPIENT SINGLE  X2 Bilateral 3/1/2018    Procedure: TRANSPLANT LUNG RECIPIENT SINGLE X2;  Median Sternotomy, Extracorporeal Membrane Oxygenator, bilateral sequential lung transplant;  Surgeon: Toby Hernandez MD;  Location:  OR           Family History:     Family History   Problem Relation Age of Onset     Hypertension Mother      Arthritis Mother      Pancreatic Cancer Father      Diabetes Father                                [facility]  Pulmonary Medicine/Lung Transplant  December 17, 2019       Today's visit note:       ASSESSMENT/PLAN:    From 11/11/19 note:  The patient is a very pleasant 58-year-old female with bilateral sequential lung transplant in March 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  Her posttransplant course has been significantly complicated as described below.     1.  History of bilateral sequential lung transplant for ILD: The patient is overall doing well from respiratory standpoint.  She has had significant decline in her lung function which is likely mechanical due to large ascites.  Her chest imaging is unremarkable.  She does not have any significant respiratory symptoms.  She is currently on 2 drug immunosuppression including tacrolimus with a goal of 8 to 10 ng/mL and prednisone 7.5 mg daily.  Her azathioprine is currently being held for leukopenia.  Her white count is low normal today.  We will continue to hold Imuran at this point.  If her  white count remains consistently normal, will consider restarting it.  Her last PRA in August was negative.  We will recheck it during the next visit.  I anticipate that her lung function will improve once her ascites is relieved.     2.  Infectious disease: We will restart dapsone for PCP prophylaxis.  Her G6PD level is normal.  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  She will follow-up with transplant infectious  disease at that time.  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  Her EBV level was recently noted to be minimally elevated, we will recheck it today.     3.  Acute renal failure: Likely related to acute calcineurin inhibitor toxicity.  The patient appears to be starting to make urine so anticipate possible renal recovery.  She continues to be on dialysis 3 times a week.  We will follow-up with nephrology.     4.  New onset ascites with portal hypertension: The patient has persistent elevation of LFTs consistent with hepatocellular injury pattern.  Immunologic testing for causes of liver disease is unremarkable.  Patient is scheduled to follow-up with hepatology approximately 1 month from now.  We will reach out to them for any further work-up and sooner clinic follow-up given rising GGT.     5.  Hypertension: Blood pressure remains elevated but the patient has not taken her morning medications today.  She will continue amlodipine, carvedilol and Imdur.     Patient will return to clinic in approximately 1 month with repeat PFTs, imaging, labs.  She will follow-up with transplant infectious disease and hepatology in the interim.  Please also refer to RN Transplant Coordinator note for additional information related to this visit.  PATIENT PROFILE AND TRANSPLANT HISTORY:  Current age:                    59 year old  Underlying lung disease: IIP: Nonspecific Interstitial Pneumonia    Transplant date(s):        3/1/2018 (Lung)  Transplant POD(s):        655  Lung transplant type(s):       Transplant coordinator:   Mikayla Latham  Transplant provider(s):        Ame Servin, Toby Hernandez, Nicho Hood    Active Patient Thresholds     Lab Low High Effective Since Comment    Tacrolimus Level 8 10 06/06/2019 6/6/19 CP          INTERVAL HISTORY:  --Most recent resulted PRA:  --Most recent bronchoscopy:  --Most recent  "Tbbx:    --Most recent lung transplant clinic visit: 11/11/19 ([facility])    --Interval clinic visits, test results, signif medical events (obtained by chart review and patient hx):    12/3/19: Off dialysis since ~Thanksgiving. Started on bicarbonate form HCO3 17    12/9/19: Pt called coordinator: \"Patient calls to say she's short of breath and has the chills. States she had a paracentesis last week where they took off 4.7L and \"it's now all back\". Patient sees nephrologist this afternoon and will call back with updates\"    --Today:  ***    REVIEW OF SYSTEMS:  ***    PHYSICAL EXAM:  Vitals:    12/18/19 1050   BP: 108/70   Pulse: 89   SpO2: 97%   Weight: 60.1 kg (132 lb 6.4 oz)     Constitutional:    Awake, alert and in no apparent distress   Eyes:    Anicteric, PERRL   ENT:    oral mucosa moist without lesions    Neck:    Supple without supraclavicular or cervical lymphadenopathy    Lungs:    Good air entry both lungs. No crackles. No rhonchi. No wheezes.    Cardiovascular:    Normal S1 and S2. No JVD, murmur, rub, chavez.   Abdomen:    NABS, soft, nontender, nondistended. No HSM.    Musculoskeletal:    No edema.    Neurologic:    Alert and conversant.    Skin:    Warm, dry. No rash seen.          Data:     I reviewed all resulted lab tests and imaging studies.    No results found for any visits on 12/18/19.    PULMONARY FUNCTION TEST INTERPRETATION:  ***    SIX-MINUTE WALK TEST INTERPRETATION:  ***    STUDIES STILL PENDING AT THE TIME OF THIS NOTE:  Unresulted Labs Ordered in the Past 30 Days of this Admission     No orders found from 11/18/2019 to 12/19/2019.          Complexity indicators:    --immune compromised, on high-risk medications x   --organ transplant recipient x   --multiple organ transplant recipient    --active respiratory infection    --within one year of transplant; and/or within one month of hospitalization    --chronic lung allograft dysfunction syndrome (CLAD, chronic rejection, or bronchiolitis " obliterans syndrome)    --new medical problem addressed during this visit    --multiple active medical problems    --admitted directly to hospital from this clinic visit    -->50% of this visit was spent in counseling and care coordination. If yes, total visit time was              Medications:     Current Outpatient Medications   Medication     calcium-vitamin D (CALTRATE) 600-400 MG-UNIT per tablet     carvedilol (COREG) 25 MG tablet     dapsone (ACZONE) 100 MG tablet     ferrous sulfate (FEROSUL) 325 (65 Fe) MG tablet     magnesium oxide (MAG-OX) 400 MG tablet     multivitamin, therapeutic with minerals (THERA-VIT-M) TABS tablet     ondansetron (ZOFRAN) 4 MG tablet     pantoprazole (PROTONIX) 40 MG EC tablet     predniSONE (DELTASONE) 2.5 MG tablet     senna-docusate (SENOKOT-S/PERICOLACE) 8.6-50 MG tablet     tacrolimus (GENERIC EQUIVALENT) 0.5 MG capsule     tacrolimus (GENERIC EQUIVALENT) 1 MG capsule     voriconazole (VFEND) 200 MG tablet     voriconazole (VFEND) 50 MG tablet     No current facility-administered medications for this visit.             Past Medical and Surgical History:     Past Medical History:   Diagnosis Date     Antisynthetase syndrome (H) 2014     Chronic cough      Chronic infection     recent C diff  8/18     Dehydration 8/1/2018     Dermatomyositis (H)      Dysplasia of cervix, low grade (ESTRADA 1)      ILD (interstitial lung disease) (H)      Osteopenia      PONV (postoperative nausea and vomiting)      Pulmonary hypertension (H)      Raynaud's disease      Seronegative rheumatoid arthritis (H)      Past Surgical History:   Procedure Laterality Date     BRONCHOSCOPY (RIGID OR FLEXIBLE), DIAGNOSTIC N/A 4/10/2018    Procedure: COMBINED BRONCHOSCOPY (RIGID OR FLEXIBLE), LAVAGE;;  Surgeon: Mariposa Donohue MD;  Location:  GI     BRONCHOSCOPY (RIGID OR FLEXIBLE), DILATE BRONCHUS / TRACHEA N/A 10/11/2018    Procedure: BRONCHOSCOPY (RIGID OR FLEXIBLE), DILATE BRONCHUS / TRACHEA;  Flexible  And Rigid Bronchoscopy and Dilation;  Surgeon: Wilber Lin MD;  [facility]     BRONCHOSCOPY FLEXIBLE N/A 3/13/2018    Procedure: BRONCHOSCOPY FLEXIBLE;  Flexible Bronchoscopy ;  Surgeon: Gissell Sanchez MD;  [facility]     BRONCHOSCOPY FLEXIBLE N/A 5/9/2018    Procedure: BRONCHOSCOPY FLEXIBLE;;  Surgeon: Wilber Lin MD;  [facility]     BRONCHOSCOPY FLEXIBLE AND RIGID N/A 9/10/2018    Procedure: BRONCHOSCOPY FLEXIBLE AND RIGID;  Flexible and Rigid Bronchoscopy with Balloon Dilation, tissue debulking with cryo, and Right mainstem bronchus stent placement;  Surgeon: Wilber Lin MD;  [facility]     BRONCHOSCOPY RIGID N/A 6/6/2018    Procedure: BRONCHOSCOPY RIGID;;  Surgeon: Lopez Macias MD;  [facility]     BRONCHOSCOPY, DILATE BRONCHUS, STENT BRONCHUS, COMBINED N/A 6/11/2018    Procedure: COMBINED BRONCHOSCOPY, DILATE BRONCHUS, STENT BRONCHUS;  Flexible Bronchoscopy, Balloon Dilation, Bronchial Washings;  Surgeon: Wilber Lin MD;  [facility]     BRONCHOSCOPY, DILATE BRONCHUS, STENT BRONCHUS, COMBINED Right 7/10/2018    Procedure: COMBINED BRONCHOSCOPY, DILATE BRONCHUS, STENT BRONCHUS;  Flexible Bronchoscopy, Balloon Dilation, Bronchial Washings  ;  Surgeon: Wilber Lin MD;  [facility]     BRONCHOSCOPY, DILATE BRONCHUS, STENT BRONCHUS, COMBINED N/A 8/2/2018    Procedure: COMBINED BRONCHOSCOPY, DILATE BRONCHUS, STENT BRONCHUS;  Flexible Bronchoscopy, Bronchial Washings, Balloon Dilation;  Surgeon: Wilber Lin MD;  [facility]     BRONCHOSCOPY, DILATE BRONCHUS, STENT BRONCHUS, COMBINED N/A 8/20/2018    Procedure: COMBINED BRONCHOSCOPY, DILATE BRONCHUS, STENT BRONCHUS;  Flexible Bronchoscopy, Balloon Dilation;  Surgeon: Wilber Lin MD;  [facility]     BRONCHOSCOPY, DILATE BRONCHUS, STENT BRONCHUS, COMBINED N/A 10/29/2018    Procedure: Flexible Bronchoscopy, Balloon Dilation, Stent  Revision, Airway Examination And Therapeutic Suctioning, Cyro Tumor Debulking;  Surgeon: Wilber Lin MD;  [facility]     BRONCHOSCOPY, DILATE BRONCHUS, STENT BRONCHUS, COMBINED N/A 11/20/2018    Procedure: Rigid Bronchoscopy, Stent Removal and dilitation;  Surgeon: Wilber Lin MD;  [facility]     BRONCHOSCOPY, DILATE BRONCHUS, STENT BRONCHUS, COMBINED N/A 12/14/2018    Procedure: Flexible And Rigid Bronchoscopy, Balloon Dilation, Bronchial Washing;  Surgeon: Wilber Lin MD;  [facility]     BRONCHOSCOPY, DILATE BRONCHUS, STENT BRONCHUS, COMBINED N/A 1/17/2019    Procedure: Flexible And Rigid Bronchoscopy, Balloon Dilation.;  Surgeon: Wilber Lin MD;  [facility]     BRONCHOSCOPY, DILATE BRONCHUS, STENT BRONCHUS, COMBINED N/A 3/7/2019    Procedure: Flexible and Rigid Bronchoscopy, Bronchial Washing, Balloon Dilation;  Surgeon: Wilber Lin MD;  [facility]     BRONCHOSCOPY, DILATE BRONCHUS, STENT BRONCHUS, COMBINED N/A 6/6/2019    Procedure: Rigid and Flexible Bronchoscopy, Balloon Dilation;  Surgeon: Wilber Lin MD;  [facility]     BRONCHOSCOPY, DILATE BRONCHUS, STENT BRONCHUS, COMBINED N/A 10/11/2019    Procedure: Flexible and Rigid Bronchoscopy, Balloon Dilation, Bronchoalveolar Lagave;  Surgeon: Wilber Lin MD;  [facility]     ENT SURGERY      tonsillectomy as a child     ESOPHAGOSCOPY, GASTROSCOPY, DUODENOSCOPY (EGD), COMBINED N/A 10/29/2018    Procedure: COMBINED ESOPHAGOSCOPY, GASTROSCOPY, DUODENOSCOPY (EGD) with biopsies and polypectomy;  Surgeon: Chente Bloom MD;  [facility]     INSERT EXTRACORPORAL MEMBRANE OXYGENATOR ADULT (OUTSIDE OR) N/A 2/27/2018    Procedure: INSERT EXTRACORPORAL MEMBRANE OXYGENATOR ADULT (OUTSIDE OR);  INSERT EXTRACORPORAL MEMBRANE OXYGENATOR ADULT (OUTSIDE OR) ;  Surgeon: Toby Hernandez MD;  [facility]     IR CVC TUNNEL PLACEMENT > 5 YRS OF  AGE  10/25/2019     IR THORACENTESIS  9/13/2019     no prior surgery       REMOVE EXTRACORPORAL MEMBRANE OXYGENATOR ADULT N/A 3/3/2018    Procedure: REMOVE EXTRACORPORAL MEMBRANE OXYGENATOR ADULT;  Removal of Right Femoral Venous and Right Axillary Arterial Extracorporeal Membrane Oxygenator;  Surgeon: Toby Hernandez MD;  [facility]     TRANSPLANT LUNG RECIPIENT SINGLE X2 Bilateral 3/1/2018    Procedure: TRANSPLANT LUNG RECIPIENT SINGLE X2;  Median Sternotomy, Extracorporeal Membrane Oxygenator, bilateral sequential lung transplant;  Surgeon: Toby Hernandez MD;  [facility]           Family History:     Family History   Problem Relation Age of Onset     Hypertension Mother      Arthritis Mother      Pancreatic Cancer Father      Diabetes Father                            Transplant Coordinator Note    Reason for visit: Post lung transplant follow up visit   Coordinator: Present   Caregiver:      Health concerns addressed today:  1. Mg 1.4  2. PFTs stable  3. Creat 2.70 today     Activity/rehab: [place]  Oxygen needs: room air  Pain management/RX: denies  Diabetic management: n/a  Next Bronch due: n/a  High risk donor: yes  CMV status:  Valcyte stopped:   PJP prophylactic: dapsone    Pt Education: medications (use/dose/side effects), how/when to call coordinator, frequency of labs, s/s of infection/rejection, call prior to starting any new medications, lab/vital sign book    Health Maintenance:     Last colonoscopy:     Next colonoscopy due:     Dermatology:    Vaccinations this visit:     Labs, CXR, PFTs reviewed with patient  Medication record reviewed and reconciled  Questions and concerns addressed    Patient Instructions  1. Continue to hydrate with 60-70 oz of fluids daily.   2. Try to exercise most days of the week.   3.     Next transplant clinic appointment:  with CXR, labs and PFTs  Next lab draw:       AVS printed at time of check out          Srinivas Mcelroy MD

## 2019-12-18 NOTE — LETTER
12/18/2019       RE: Kecia Blue  [address] [place] [address]     Dear Colleague,    Thank you for referring your patient, Kecia Blue, to the [facility]ID [facility]GAN TRANSPLANT at [facility]. Please see a copy of my visit note below.        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  Her posttransplant course has been significantly complicated as described below.     #  History of bilateral sequential lung transplant for ILD: She is not having acute respiratory symptoms but her pulmonary function tests have declined since she developed ascites, presumably because of limitation of diaphragmatic movement. Her chest imaging is unremarkable.  She is currently on 2 drug immunosuppression including tacrolimus with a goal of 8 to 10 ng/mL and prednisone 7.5 mg daily.       # ID prophylaxis: dapsone for PCP prophylaxis, started during October 2019 hospitalization when Bactrim was stopped. (G6PD level is normal.    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  The patient will continue on voriconazole for a prolonged course. Refer to chart ID  note of today for details. CT today showed some decrease in left pleural fluid.    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 She continues to be on dialysis 3 times a week. Has oral fluid limit of 1/5 liters/24 hours.     # Recent onset ascites with  "portal hypertension: Refer to Hepatology note of today (12/18/19) for details. Tests for hep B, hep C, autoimmune hepatitis and PBC have been negative. Hepatology impression is that liver disease is either due to chronic right heart dysfunction vs nodular regenerative hyperplasia. Recommended re-evaluation by cardiology for repeat right heart cath and transjugular liver biopsy. Consider TIPS, depending on biopsy results.     # Hypertension: She will continue amlodipine, carvedilol and Imdur.    # Healthcare maintenance--[id number] administered today.     Please also refer to RN Transplant Coordinator note for additional information related to this visit.    PATIENT PROFILE AND TRANSPLANT HISTORY:  Current age:                    59 year old  Underlying lung disease: IIP: Nonspecific Interstitial Pneumonia    Transplant date(s):        3/1/2018 (Lung)  Transplant POD(s):        655  Lung transplant type(s): Bilateral single lung      Transplant coordinator:   Mikayla Latham  Transplant provider(s):        Ame Servin    Active Patient Thresholds     Lab Low High Effective Since Comment    Tacrolimus Level 8 10 06/06/2019 6/6/19 CP          INTERVAL HISTORY:  --Most recent resulted PRA: 12/18/19, neg for DSA    --Most recent lung transplant clinic visit: 11/11/19 ([facility])    --Interval clinic visits, test results, signif medical events (obtained by chart review and patient hx):    12/3/19: Off dialysis since ~Thanksgiving. Started on bicarbonate form HCO3 17    12/9/19: Pt called coordinator: \"Patient calls to say she's short of breath and has the chills. States she had a paracentesis last week where they took off 4.7L and \"it's now all back\". Patient sees nephrologist this afternoon and will call back with updates\"    12/10/19: Started back on dialysis for low urine output and increased ascites    12/11/19: Had 3.3 liter paracentesis    --Today:    Ms. Blue returned to clinic today for a " "previously scheduled clinic visit.  She reports that her breathing \"goodness\" depends on how much ascites she has and that she improves temporarily after each paracentesis.  She has been having URI symptoms for the past couple of days.  She is not having purulent sputum, hemoptysis, chest pain, or wheezing.  She is not having fevers.    REVIEW OF SYSTEMS:    #She has little urine output.  As noted above, she is back on dialysis and is tolerating the runs well    #She is not having abdominal pain    She is not having  symptoms    Complete review of systems was asked and was otherwise negative.    PHYSICAL EXAM:  Vitals:    12/18/19 1050   BP: 108/70   Pulse: 89   SpO2: 97%   Weight: 60.1 kg (132 lb 6.4 oz)     Constitutional:    Awake, alert and in no apparent distress breathing room air at rest.    Eyes:    Anicteric, PERRL   ENT:    oral mucosa moist without lesions    Neck:    Supple without supraclavicular or cervical lymphadenopathy    Lungs:    Good air entry both lungs. No crackles. No rhonchi. No wheezes.    Cardiovascular:    Normal S1 and S2. No JVD, murmur, rub, chavez.RRR   Abdomen:    NABS, soft, nontender. No HSM. Moderately distended but not tense.    Musculoskeletal:    Trace edema bilat below knees   Neurologic:    Alert and conversant.    Skin:    Warm, dry. No rash seen.          Data:     I reviewed all resulted lab tests and imaging studies.    Results for orders placed or performed in visit on 12/18/19   HLA Donor Specific Antibody     Status: None   Result Value Ref Range    Donor Identification 03/01/2018     Organ Lung     DSA Present NO     DSA Comments        Flow Single Antigen Beads assays are intended for   detection/identification of IgG anti-HLA antibodies. Mfi values may not   accurately quantify donor-specific antibody levels in all instances.      DSA Test Method SA FCS    Results for orders placed or performed in visit on 12/18/19   Respiratory Panel PCR - NP Swab     Status: " None   Result Value Ref Range    Adenovirus Not Detected NDET^Not Detected    Coronavirus Not Detected NDET^Not Detected    Human Metapneumovirus Not Detected NDET^Not Detected    Human Rhinovirus/Enterovirus Not Detected NDET^Not Detected    Influenza A Not Detected NDET^Not Detected    Influenza A, H1 Not Detected NDET^Not Detected    Influenza A 2009 H1N1 Not Detected NDET^Not Detected    Influenza A, H3 Not Detected NDET^Not Detected    Influenza B Not Detected NDET^Not Detected    Parainfluenza Virus 1 Not Detected NDET^Not Detected    Parainfluenza Virus 2 Not Detected NDET^Not Detected    Parainfluenza Virus 3 Not Detected NDET^Not Detected    Parainfluenza Virus 4 Not Detected NDET^Not Detected    Respiratory Syncytial Virus A Not Detected NDET^Not Detected    Respiratory Syncytial Virus B Not Detected NDET^Not Detected    Chlamydia pneumoniae Not Detected NDET^Not Detected    Mycoplasma pneumoniae Not Detected NDET^Not Detected    Respiratory Virus Comment See comment below    Results for orders placed or performed in visit on 12/18/19   INR     Status: None   Result Value Ref Range    INR 1.13 0.86 - 1.14   Hepatic panel     Status: Abnormal   Result Value Ref Range    Bilirubin Direct 0.2 0.0 - 0.2 mg/dL    Bilirubin Total 0.5 0.2 - 1.3 mg/dL    Albumin 3.3 (L) 3.4 - 5.0 g/dL    Protein Total 6.6 (L) 6.8 - 8.8 g/dL    Alkaline Phosphatase 278 (H) 40 - 150 U/L    ALT 18 0 - 50 U/L    AST 18 0 - 45 U/L   Basic metabolic panel     Status: Abnormal   Result Value Ref Range    Sodium 136 133 - 144 mmol/L    Potassium 3.2 (L) 3.4 - 5.3 mmol/L    Chloride 98 94 - 109 mmol/L    Carbon Dioxide 33 (H) 20 - 32 mmol/L    Anion Gap 4 3 - 14 mmol/L    Glucose 111 (H) 70 - 99 mg/dL    Urea Nitrogen 21 7 - 30 mg/dL    Creatinine 2.71 (H) 0.52 - 1.04 mg/dL    GFR Estimate 19 (L) >60 mL/min/[1.73_m2]    GFR Estimate If Black 22 (L) >60 mL/min/[1.73_m2]    Calcium 8.0 (L) 8.5 - 10.1 mg/dL   Tacrolimus level     Status:  Abnormal   Result Value Ref Range    Tacrolimus Last Dose 12/17/19  2100     Tacrolimus Level 6.9 5.0 - 15.0 ug/L   EBV DNA PCR Quantitative Whole Blood     Status: Abnormal   Result Value Ref Range    EBV DNA Copies/mL 11,933 (A) EBVNEG^EBV DNA Not Detected [Copies]/mL    EBV DNA Log of Copies 4.1 (H) <2.7 [Log_copies]/mL   PRA Donor Specific Antibody     Status: None   Result Value Ref Range    SA1 Test Method SA FCS     SA1 Cell Class I     SA1 Hi Risk Madelin None     SA1 Mod Risk Madelin None     SA1 Comments        Test performed by modified procedure. Serum heat inactivated and tested   by a modified ([place]) protocol including fetal calf serum addition.   High-risk, mfi >3,000. Mod-risk, mfi 500-3,000.      SA2 Test Method SA FCS     SA2 Cell Class II     SA2 Hi Risk Madelin None     SA2 Mod Risk Madelin DP:19     SA2 Comments        Test performed by modified procedure. Serum heat inactivated and tested   by a modified ([place]) protocol including fetal calf serum addition.   High-risk, mfi >3,000. Mod-risk, mfi 500-3,000.      UNOS cPRA 0     Unacceptable Antigen None>3000     Magnesium     Status: Abnormal   Result Value Ref Range    Magnesium 1.4 (L) 1.6 - 2.3 mg/dL   CBC with platelets differential     Status: Abnormal   Result Value Ref Range    WBC 6.5 4.0 - 11.0 10e9/L    RBC Count 3.31 (L) 3.8 - 5.2 10e12/L    Hemoglobin 10.1 (L) 11.7 - 15.7 g/dL    Hematocrit 32.9 (L) 35.0 - 47.0 %    MCV 99 78 - 100 fl    MCH 30.5 26.5 - 33.0 pg    MCHC 30.7 (L) 31.5 - 36.5 g/dL    RDW 15.2 (H) 10.0 - 15.0 %    Platelet Count 86 (L) 150 - 450 10e9/L    Diff Method Automated Method     % Neutrophils 73.9 %    % Lymphocytes 11.8 %    % Monocytes 12.3 %    % Eosinophils 1.1 %    % Basophils 0.6 %    % Immature Granulocytes 0.3 %    Nucleated RBCs 0 0 /100    Absolute Neutrophil 4.8 1.6 - 8.3 10e9/L    Absolute Lymphocytes 0.8 0.8 - 5.3 10e9/L    Absolute Monocytes 0.8 0.0 - 1.3 10e9/L    Absolute Eosinophils 0.1 0.0 - 0.7 10e9/L     Absolute Basophils 0.0 0.0 - 0.2 10e9/L    Abs Immature Granulocytes 0.0 0 - 0.4 10e9/L    Absolute Nucleated RBC 0.0    Results for orders placed or performed in visit on 12/18/19   General PFT Lab (Please always keep checked)     Status: None (Preliminary result)   Result Value Ref Range    FVC-Pred 3.26 L    FVC-Pre 1.73 L    FVC-%Pred-Pre 52 %    FEV1-Pre 1.19 L    FEV1-%Pred-Pre 46 %    FEV1FVC-Pred 80 %    FEV1FVC-Pre 69 %    FEFMax-Pred 6.45 L/sec    FEFMax-Pre 2.52 L/sec    FEFMax-%Pred-Pre 39 %    FEF2575-Pred 2.41 L/sec    FEF2575-Pre 0.79 L/sec    JSO8867-%Pred-Pre 33 %    ExpTime-Pre 6.79 sec    FIFMax-Pre 2.49 L/sec    FEV1FEV6-Pred 81 %    FEV1FEV6-Pre 69 %       PULMONARY FUNCTION TEST INTERPRETATION:    Pulmonary function test (spirometry only) dated 12/18/2019 were reviewed and interpreted by me.  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  When compared with this patient's most recent previous test, dated 11/11/2019, there have been no significant changes.  However there has been a significant decrease in both FEV1 and FVC compared with 8/7/2019.      STUDIES STILL PENDING AT THE TIME OF THIS NOTE:  Unresulted Labs Ordered in the Past 30 Days of this Admission     Date and Time Order Name Status Description    12/18/2019 1151 RESPIRATORY PANEL PCR - NP SWAB In process     12/18/2019 0843 PRA DONOR SPECIFIC ANTIBODY In process     12/18/2019 0843 EBV DNA PCR QUANTITATIVE WHOLE BLOOD In process           Complexity indicators:    --immune compromised, on high-risk medications x   --organ transplant recipient x   --multiple organ transplant recipient    --active respiratory infection    --within one year of transplant; and/or within one month of hospitalization    --chronic lung allograft dysfunction syndrome (CLAD, chronic rejection, or bronchiolitis obliterans syndrome)    --new medical problem addressed during this visit    --multiple  active medical problems x   --admitted directly to hospital from this clinic visit    -->50% of this visit was spent in counseling and care coordination. If yes, total visit time was              Medications:     Current Outpatient Medications   Medication     calcium-vitamin D (CALTRATE) 600-400 MG-UNIT per tablet     carvedilol (COREG) 25 MG tablet     dapsone (ACZONE) 100 MG tablet     ferrous sulfate (FEROSUL) 325 (65 Fe) MG tablet     magnesium oxide (MAG-OX) 400 MG tablet     multivitamin, therapeutic with minerals (THERA-VIT-M) TABS tablet     ondansetron (ZOFRAN) 4 MG tablet     pantoprazole (PROTONIX) 40 MG EC tablet     predniSONE (DELTASONE) 2.5 MG tablet     senna-docusate (SENOKOT-S/PERICOLACE) 8.6-50 MG tablet     tacrolimus (GENERIC EQUIVALENT) 0.5 MG capsule     tacrolimus (GENERIC EQUIVALENT) 1 MG capsule     voriconazole (VFEND) 200 MG tablet     voriconazole (VFEND) 50 MG tablet     No current facility-administered medications for this visit.             Past Medical and Surgical History:     Past Medical History:   Diagnosis Date     Antisynthetase syndrome (H) 2014     Chronic cough      Chronic infection     recent C diff  8/18     Dehydration 8/1/2018     Dermatomyositis (H)      Dysplasia of cervix, low grade (ESTRADA 1)      ILD (interstitial lung disease) (H)      Osteopenia      PONV (postoperative nausea and vomiting)      Pulmonary hypertension (H)      Raynaud's disease      Seronegative rheumatoid arthritis (H)      Past Surgical History:   Procedure Laterality Date     BRONCHOSCOPY (RIGID OR FLEXIBLE), DIAGNOSTIC N/A 4/10/2018    Procedure: COMBINED BRONCHOSCOPY (RIGID OR FLEXIBLE), LAVAGE;;  Surgeon: Mariposa Donohue MD;  Location:  GI     BRONCHOSCOPY (RIGID OR FLEXIBLE), DILATE BRONCHUS / TRACHEA N/A 10/11/2018    Procedure: BRONCHOSCOPY (RIGID OR FLEXIBLE), DILATE BRONCHUS / TRACHEA;  Flexible And Rigid Bronchoscopy and Dilation;  Surgeon: Wilber Lin MD;  Location:  [facility]     BRONCHOSCOPY FLEXIBLE N/A 3/13/2018    Procedure: BRONCHOSCOPY FLEXIBLE;  Flexible Bronchoscopy ;  Surgeon: Gissell Sanchez MD;  Location: [facility] GI     BRONCHOSCOPY FLEXIBLE N/A 5/9/2018    Procedure: BRONCHOSCOPY FLEXIBLE;;  Surgeon: Wilber Lin MD;  Location: [facility] GI     BRONCHOSCOPY FLEXIBLE AND RIGID N/A 9/10/2018    Procedure: BRONCHOSCOPY FLEXIBLE AND RIGID;  Flexible and Rigid Bronchoscopy with Balloon Dilation, tissue debulking with cryo, and Right mainstem bronchus stent placement;  Surgeon: Wilber Lin MD;  Location: [facility] OR     BRONCHOSCOPY RIGID N/A 6/6/2018    Procedure: BRONCHOSCOPY RIGID;;  Surgeon: Lopez Macias MD;  Location: [facility]     BRONCHOSCOPY, DILATE BRONCHUS, STENT BRONCHUS, COMBINED N/A 6/11/2018    Procedure: COMBINED BRONCHOSCOPY, DILATE BRONCHUS, STENT BRONCHUS;  Flexible Bronchoscopy, Balloon Dilation, Bronchial Washings;  Surgeon: Wilber Lin MD;  Location: [facility] OR     BRONCHOSCOPY, DILATE BRONCHUS, STENT BRONCHUS, COMBINED Right 7/10/2018    Procedure: COMBINED BRONCHOSCOPY, DILATE BRONCHUS, STENT BRONCHUS;  Flexible Bronchoscopy, Balloon Dilation, Bronchial Washings  ;  Surgeon: Wilber Lin MD;  [facility]: [facility]     BRONCHOSCOPY, DILATE BRONCHUS, STENT BRONCHUS, COMBINED N/A 8/2/2018    Procedure: COMBINED BRONCHOSCOPY, DILATE BRONCHUS, STENT BRONCHUS;  Flexible Bronchoscopy, Bronchial Washings, Balloon Dilation;  Surgeon: Wilber Lin MD;  [facility]     BRONCHOSCOPY, DILATE BRONCHUS, STENT BRONCHUS, COMBINED N/A 8/20/2018    Procedure: COMBINED BRONCHOSCOPY, DILATE BRONCHUS, STENT BRONCHUS;  Flexible Bronchoscopy, Balloon Dilation;  Surgeon: Wilber Lin MD;  [facility]     BRONCHOSCOPY, DILATE BRONCHUS, STENT BRONCHUS, COMBINED N/A 10/29/2018    Procedure: Flexible Bronchoscopy, Balloon Dilation, Stent Revision, Airway Examination And Therapeutic Suctioning, Cyro Tumor Debulking;  Surgeon:  Wilber Lin MD;  [facility]     BRONCHOSCOPY, DILATE BRONCHUS, STENT BRONCHUS, COMBINED N/A 11/20/2018    Procedure: Rigid Bronchoscopy, Stent Removal and dilitation;  Surgeon: Wilber Lin MD;  [facility]     BRONCHOSCOPY, DILATE BRONCHUS, STENT BRONCHUS, COMBINED N/A 12/14/2018    Procedure: Flexible And Rigid Bronchoscopy, Balloon Dilation, Bronchial Washing;  Surgeon: Wilber Lin MD;  [facility]     BRONCHOSCOPY, DILATE BRONCHUS, STENT BRONCHUS, COMBINED N/A 1/17/2019    Procedure: Flexible And Rigid Bronchoscopy, Balloon Dilation.;  Surgeon: Wilber Lin MD;  [facility]     BRONCHOSCOPY, DILATE BRONCHUS, STENT BRONCHUS, COMBINED N/A 3/7/2019    Procedure: Flexible and Rigid Bronchoscopy, Bronchial Washing, Balloon Dilation;  Surgeon: Wilber Lin MD;  [facility]     BRONCHOSCOPY, DILATE BRONCHUS, STENT BRONCHUS, COMBINED N/A 6/6/2019    Procedure: Rigid and Flexible Bronchoscopy, Balloon Dilation;  Surgeon: Wilber Lin MD;  [facility]     BRONCHOSCOPY, DILATE BRONCHUS, STENT BRONCHUS, COMBINED N/A 10/11/2019    Procedure: Flexible and Rigid Bronchoscopy, Balloon Dilation, Bronchoalveolar Lagave;  Surgeon: Wilber Lin MD;  Location: [facility]     ENT SURGERY      tonsillectomy as a child     ESOPHAGOSCOPY, GASTROSCOPY, DUODENOSCOPY (EGD), COMBINED N/A 10/29/2018    Procedure: COMBINED ESOPHAGOSCOPY, GASTROSCOPY, DUODENOSCOPY (EGD) with biopsies and polypectomy;  Surgeon: Chente Bloom MD;  [facility]     INSERT EXTRACORPORAL MEMBRANE OXYGENATOR ADULT (OUTSIDE OR) N/A 2/27/2018    Procedure: INSERT EXTRACORPORAL MEMBRANE OXYGENATOR ADULT (OUTSIDE OR);  INSERT EXTRACORPORAL MEMBRANE OXYGENATOR ADULT (OUTSIDE OR) ;  Surgeon: Toby Hernandez MD;  [facility]: [facility]     IR CVC TUNNEL PLACEMENT > 5 YRS OF AGE  10/25/2019     IR THORACENTESIS  9/13/2019     no prior surgery       REMOVE  "EXTRACORPORAL MEMBRANE OXYGENATOR ADULT N/A 3/3/2018    Procedure: REMOVE EXTRACORPORAL MEMBRANE OXYGENATOR ADULT;  Removal of Right Femoral Venous and Right Axillary Arterial Extracorporeal Membrane Oxygenator;  Surgeon: Toby Hernandez MD;  [facility]     TRANSPLANT LUNG RECIPIENT SINGLE X2 Bilateral 3/1/2018    Procedure: TRANSPLANT LUNG RECIPIENT SINGLE X2;  Median Sternotomy, Extracorporeal Membrane Oxygenator, bilateral sequential lung transplant;  Surgeon: Toby Hernandez MD;  [facility]: [facility]           Family History:     Family History   Problem Relation Age of Onset     Hypertension Mother      Arthritis Mother      Pancreatic Cancer Father      Diabetes Father                            Transplant Coordinator Note    Reason for visit: Post lung transplant follow up visit   Coordinator: Present   Caregiver:  Ranjan () present    Health concerns addressed today:  1. Mg 1.4  2. PFTs stable, breathing good \"until I get full of fluid\". Some congestion today - RVP ordered  3. Creat 2.70 today.  Back on dialysis, started last Thursday 12/12. Dialysis 12/17 took of 2.5L  4. paracentesis x 2, last on 12/11, took of 3.3L     Activity/rehab: up [place]  Oxygen needs: room air  Pain management/RX: denies  Diabetic management: n/a  Next Bronch due: n/a  High risk donor: yes  CMV status:  Valcyte stopped:   PJP prophylactic: dapsone    Pt Education: medications (use/dose/side effects), how/when to call coordinator, frequency of labs, s/s of infection/rejection, call prior to starting any new medications, lab/vital sign book    Health Maintenance:     Last colonoscopy:     Next colonoscopy due:     Dermatology:    Vaccinations this visit: pneumococcal 23     Labs, CXR, PFTs reviewed with patient  Medication record reviewed and reconciled  Questions and concerns addressed    Patient Instructions  1. Follow nephrology recommendations for fluid intake - limit to 1.5L  2. Try to exercise most " days of the week.   3. IR scheduling will call you to set up a liver biopsy and check pressures.   4. Dr. Mcelroy will talk to cardiology and see if we can just a right heart catheterization (to check pressures in heart) or if they will want to see you first.   5. You got the pneumococcal 23 vaccine today.     Next transplant clinic appointment: 2 months with CXR, chest CT labs, 6 minute walk test, and PFTs  Next lab draw: 1 week      AVS printed at time of check out          Again, thank you for allowing me to participate in the care of your patient.      Sincerely,    Srinivas Mcelroy MD

## 2019-12-18 NOTE — NURSING NOTE
Chief Complaint   Patient presents with     [facility] f/u     Pat Toney MA

## 2019-12-18 NOTE — PATIENT INSTRUCTIONS
Patient Instructions  1. Follow nephrology recommendations for fluid intake - limit to 1.5L  2. Try to exercise most days of the week.   3. IR scheduling will call you to set up a liver biopsy and check pressures.   4. Dr. Mcelroy will talk to cardiology and see if we can just a right heart catheterization (to check pressures in heart) or if they will want to see you first.   5. You got the pneumococcal 23 vaccine today.     Next transplant clinic appointment: 2 months with CXR, chest CT labs, 6 minute walk test, and PFTs  Next lab draw: 1 week      ~~~~~~~~~~~~~~~~~~~~~~~~~    Thoracic Transplant Office phone [phone], fax [phone]    Office Hours 8:30 - 5:00     For after-hours urgent issues, please dial [phone], and ask to speak with the Thoracic Transplant Coordinator  On-Call, pager [phone].  --------------------  To expedite your medication refill(s), please contact your pharmacy and have them fax a refill request to: [phone]  .   *Please allow 3 business days for routine medication refills.  *Please allow 5 business days for controlled substance medication refills.    **For Diabetic medications and supplies refill(s), please contact your pharmacy and have them  Contact your Endocrine team.  --------------------  For scheduling appointments call [phone].  --------------------  Please Note: If you are active on [employer], all future test results will be sent by See[employer] message only, and will no longer be called to patient. You may also receive communication directly from your physician.

## 2019-12-18 NOTE — PROGRESS NOTES
"Transplant Coordinator Note    Reason for visit: Post lung transplant follow up visit   Coordinator: Present   Caregiver:  Ranjan () present    Health concerns addressed today:  1. Mg 1.4  2. PFTs stable, breathing good \"until I get full of fluid\". Some congestion today - RVP ordered  3. Creat 2.70 today.  Back on dialysis, started last Thursday 12/12. Dialysis 12/17 took of 2.5L  4. paracentesis x 2, last on 12/11, took of 3.3L     Activity/rehab: [place]  Oxygen needs: room air  Pain management/RX: denies  Diabetic management: n/a  Next Bronch due: n/a  High risk donor: yes  CMV status:  Valcyte stopped:   PJP prophylactic: dapsone    Pt Education: medications (use/dose/side effects), how/when to call coordinator, frequency of labs, s/s of infection/rejection, call prior to starting any new medications, lab/vital sign book    Health Maintenance:     Last colonoscopy:     Next colonoscopy due:     Dermatology:    Vaccinations this visit: pneumococcal 23     Labs, CXR, PFTs reviewed with patient  Medication record reviewed and reconciled  Questions and concerns addressed    Patient Instructions  1. Follow nephrology recommendations for fluid intake - limit to 1.5L  2. Try to exercise most days of the week.   3. IR scheduling will call you to set up a liver biopsy and check pressures.   4. Dr. Mcelroy will talk to cardiology and see if we can just a right heart catheterization (to check pressures in heart) or if they will want to see you first.   5. You got the pneumococcal 23 vaccine today.     Next transplant clinic appointment: 2 months with CXR, chest CT labs, 6 minute walk test, and PFTs  Next lab draw: 1 week      AVS printed at time of check out        "

## 2019-12-18 NOTE — PROGRESS NOTES
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      While she was in the hospital, she was found to have ascites on imaging studies and physical exam, and underwent a diagnostic and therapeutic paracentesis.  Fluid analysis was consistent with portal hypertension with SAAG greater than 1.1 and ascitic fluid protein less than 2.5.  Since she was last seen in the hospital, she is symptomatically doing well and has underwent 2 therapeutic paracentesis since then.  She is currently being followed closely by the nephrology service, and he started dialysis for her acute kidney injury.  She denies any fevers or chills, headaches, however she does have some dyspnea on exertion.  She denies any abdominal pain, leg swelling, confusion or sleep-wake cycle disturbances, melena, hematochezia, or hematemesis. She has not seen a Cardiologist since discharge.     ROS: 10pt ROS performed and otherwise negative.    PERTINENT PAST MEDICAL/SURGICAL HISTORY:  Past Medical History:   Diagnosis Date     Antisynthetase syndrome (H) 2014     Chronic cough      Chronic infection     recent C diff  8/18     Dehydration 8/1/2018     Dermatomyositis (H)      Dysplasia of cervix, low grade (ESTRADA 1)      ILD (interstitial lung disease) (H)      Osteopenia      PONV (postoperative nausea and vomiting)      Pulmonary hypertension (H)      Raynaud's disease      Seronegative rheumatoid arthritis (H)       PERTINENT MEDICATIONS:   Current Outpatient Medications:      calcium-vitamin D (CALTRATE)  "600-400 MG-UNIT per tablet, Take 1 tablet by mouth daily, Disp: , Rfl:      carvedilol (COREG) 25 MG tablet, Take 1 tablet (25 mg) by mouth 2 times daily (with meals), Disp: 60 tablet, Rfl: 0     dapsone (ACZONE) 100 MG tablet, Take 1 tablet (100 mg) by mouth daily, Disp: 30 tablet, Rfl: 11     ferrous sulfate (FEROSUL) 325 (65 Fe) MG tablet, Take 1 tablet (325 mg) by mouth daily (with breakfast), Disp: 60 tablet, Rfl: 2     magnesium oxide (MAG-OX) 400 MG tablet, Take 400 mg by mouth daily , Disp: , Rfl:      multivitamin, therapeutic with minerals (THERA-VIT-M) TABS tablet, Take 1 tablet by mouth daily, Disp: 30 each, Rfl: 11     ondansetron (ZOFRAN) 4 MG tablet, Take 1 tablet (4 mg) by mouth every 12 hours as needed for nausea, Disp: 60 tablet, Rfl: 0     pantoprazole (PROTONIX) 40 MG EC tablet, Take 1 tablet (40 mg) by mouth 2 times daily, Disp: 60 tablet, Rfl: 11     predniSONE (DELTASONE) 2.5 MG tablet, Take two tablets (5mg) every AM and one tablet (2.5mg) every evening, Disp: 90 tablet, Rfl: 11     senna-docusate (SENOKOT-S/PERICOLACE) 8.6-50 MG tablet, Take 2 tablets by mouth 2 times daily as needed for constipation, Disp: 60 tablet, Rfl: 0     tacrolimus (GENERIC EQUIVALENT) 0.5 MG capsule, Take 1 capsule (0.5 mg) by mouth 2 times daily, Disp: 60 capsule, Rfl: 11     tacrolimus (GENERIC EQUIVALENT) 1 MG capsule, Take 1 capsule (1 mg) by mouth every evening HOLD FOR DOSE CHANGES NOT CURRENTLY TAKING as of 12/5/19Total dose: 0.5 mg in the AM and 1 mg in the PM, Disp: 30 capsule, Rfl: 11     voriconazole (VFEND) 200 MG tablet, Take 1 tablet (200 mg) by mouth 2 times daily TOTAL DOSE: 250 mg every 12 hours, Disp: 60 tablet, Rfl: 3     voriconazole (VFEND) 50 MG tablet, Take 1 tablet (50 mg) by mouth 2 times daily TOTAL DOSE: 250 mg every 12 hours, Disp: 60 tablet, Rfl: 3     PHYSICAL EXAMINATION:  Vitals reviewed  /70   Pulse 89   Ht 1.626 m (5' 4\")   Wt 60.1 kg (132 lb 6.4 oz)   LMP 06/07/2014 " (Exact Date)   SpO2 97%   BMI 22.73 kg/m    Wt Readings from Last 2 Encounters:   12/18/19 60.1 kg (132 lb 6.4 oz)   11/11/19 58.8 kg (129 lb 11.2 oz)     Gen: aaox3, cooperative, pleasant,   HEENT: ncat, neck supple,    Resp/CV without acute findings   Abd: soft, distended  Ext: no c/c/e  Skin: warm, perfused, no jaundice  Neuro: grossly intact, no asterixis noted    PERTINENT STUDIES:  MELD-Na score: 18 at 12/18/2019  8:54 AM  MELD score: 17 at 12/18/2019  8:54 AM  Calculated from:  Serum Creatinine: 2.71 mg/dL at 12/18/2019  8:54 AM  Serum Sodium: 136 mmol/L at 12/18/2019  8:54 AM  Total Bilirubin: 0.5 mg/dL (Rounded to 1 mg/dL) at 12/18/2019  8:54 AM  INR(ratio): 1.13 at 12/18/2019  8:54 AM  Age: 59 years       ASSESSMENT/PLAN:  59 year old female with a medical history significant for hypertension, chronic kidney disease with 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      1. Portal hypertension:  As previously documented, ascitc fluid analysis was suggestive of portal hypertension.  Prior imaging studies have shown nodularity and coarse hepatic echotexture on ultrasound. While she was in the hospital she had testing done for autoimmune hepatitis and primary biliary cholangitis, which were all negative.  She has had negative hepatitis B and C testing.    Most likely differentials include chronic liver disease from chronic hepatic congestion from severe right-sided heart dysfunction and pulmonary hypertension (echocardiogram from 2/2018 notes hypokinetic RV, with RVSP of ~89 mmHg; and RHC notes PA pressures of 61 mmHg) though improved on last echocardiogram; versus nodular regenerative hyperplasia based on its documented association with  anti-synthetase syndrome and Raynaud's syndrome.  Elevated alkaline phosphatase may be related to poor excretion from her kidneys or may also signify NRH. NAFLD also considered.    Discussed that she will need cardiology follow-up with consideration for right heart catheterization.  She will also benefit from transjugular liver biopsy with measurement of portal pressures.  She does have significant portal hypertension, she may benefit from placement of a TIPS.  She currently does not have any evidence of synthetic dysfunction (INR 1.13), and if she does have cirrhosis, her MELD score is 17 (mainly driven by RADHA on CKD).    PLAN:  -- Transjugular liver biopsy with measurement of portal pressures  -- Cardiology consultation for consideration of RHC.      RTC 3 months, sooner if symptomatic.      The visit lasted up to 35 minutes, with more than half of the time spent on counseling and education.  All questions were answered to patient's satisfaction    Patient seen and discussed with staff GI physician, Dr. Denise, who agrees with my assessment and plan.      Vale Mas MD  Gastroenterology fellow  PGY 5  [phone]   Attestation:  This patient has been seen and evaluated by me, Feng Denise.  Discussed with the house staff team or resident(s) and agree with the findings and plan in this note.

## 2019-12-19 DIAGNOSIS — Z94.2 LUNG REPLACED BY TRANSPLANT (H): Primary | ICD-10-CM

## 2019-12-19 LAB
C PNEUM DNA SPEC QL NAA+PROBE: NOT DETECTED
DONOR IDENTIFICATION: NORMAL
DSA COMMENTS: NORMAL
DSA PRESENT: NO
DSA TEST METHOD: NORMAL
EBV DNA # SPEC NAA+PROBE: ABNORMAL {COPIES}/ML
EBV DNA SPEC NAA+PROBE-LOG#: 4.1 {LOG_COPIES}/ML
FLUAV H1 2009 PAND RNA SPEC QL NAA+PROBE: NOT DETECTED
FLUAV H1 RNA SPEC QL NAA+PROBE: NOT DETECTED
FLUAV H3 RNA SPEC QL NAA+PROBE: NOT DETECTED
FLUAV RNA SPEC QL NAA+PROBE: NOT DETECTED
FLUBV RNA SPEC QL NAA+PROBE: NOT DETECTED
HADV DNA SPEC QL NAA+PROBE: NOT DETECTED
HCOV PNL SPEC NAA+PROBE: NOT DETECTED
HMPV RNA SPEC QL NAA+PROBE: NOT DETECTED
HPIV1 RNA SPEC QL NAA+PROBE: NOT DETECTED
HPIV2 RNA SPEC QL NAA+PROBE: NOT DETECTED
HPIV3 RNA SPEC QL NAA+PROBE: NOT DETECTED
HPIV4 RNA SPEC QL NAA+PROBE: NOT DETECTED
M PNEUMO DNA SPEC QL NAA+PROBE: NOT DETECTED
MICROBIOLOGIST REVIEW: NORMAL
ORGAN: NORMAL
RSV RNA SPEC QL NAA+PROBE: NOT DETECTED
RSV RNA SPEC QL NAA+PROBE: NOT DETECTED
RV+EV RNA SPEC QL NAA+PROBE: NOT DETECTED
SA1 CELL: NORMAL
SA1 COMMENTS: NORMAL
SA1 HI RISK ABY: NORMAL
SA1 MOD RISK ABY: NORMAL
SA1 TEST METHOD: NORMAL
SA2 CELL: NORMAL
SA2 COMMENTS: NORMAL
SA2 HI RISK ABY UA: NORMAL
SA2 MOD RISK ABY: NORMAL
SA2 TEST METHOD: NORMAL
UNACCEPTABLE ANTIGEN: NORMAL
UNOS CPRA: 0

## 2019-12-26 ENCOUNTER — TELEPHONE (OUTPATIENT)
Dept: TRANSPLANT | Facility: CLINIC | Age: 59
End: 2019-12-26

## 2019-12-26 DIAGNOSIS — Z94.2 LUNG REPLACED BY TRANSPLANT (H): ICD-10-CM

## 2019-12-26 DIAGNOSIS — K76.6 PORTAL HYPERTENSION (H): Primary | ICD-10-CM

## 2019-12-26 PROCEDURE — 80197 ASSAY OF TACROLIMUS: CPT | Performed by: INTERNAL MEDICINE

## 2019-12-26 NOTE — TELEPHONE ENCOUNTER
Noted, Dr. Mcelroy notified of results and that this writer has sent a message to patient's hepatology team. Otherwise nothing urgently needs to be done for this result. Await GI response.

## 2019-12-26 NOTE — TELEPHONE ENCOUNTER
IR scheduling called to confirm correct order for Interjugular/liver biopsy procedure is correct. [employer] sent to patient for IR scheduling to get procedure scheduled.     Dr. Mcelroy to check and see if cardiology would like [facility].

## 2019-12-26 NOTE — TELEPHONE ENCOUNTER
DATE:  12/26/2019   TIME OF RECEIPT FROM LAB:  12:29 pm  LAB TEST:  Alk Phos  LAB VALUE:  431  RESULTS GIVEN WITH READ-BACK TO (PROVIDER):  Mikayla Latham  TIME LAB VALUE REPORTED TO PROVIDER:   12:32 pm

## 2019-12-27 LAB
TACROLIMUS BLD-MCNC: 15.9 UG/L (ref 5–15)
TME LAST DOSE: ABNORMAL H

## 2019-12-30 ENCOUNTER — TELEPHONE (OUTPATIENT)
Dept: TRANSPLANT | Facility: CLINIC | Age: 59
End: 2019-12-30

## 2019-12-30 DIAGNOSIS — Z94.2 S/P LUNG TRANSPLANT (H): ICD-10-CM

## 2019-12-30 RX ORDER — TACROLIMUS 0.5 MG/1
0.5 CAPSULE ORAL EVERY EVENING
Qty: 30 CAPSULE | Refills: 11 | Status: SHIPPED | OUTPATIENT
Start: 2019-12-30 | End: 2020-04-17 | Stop reason: DRUGHIGH

## 2019-12-30 RX ORDER — TACROLIMUS 1 MG/1
1 CAPSULE ORAL EVERY MORNING
Qty: 30 CAPSULE | Refills: 11 | Status: SHIPPED | OUTPATIENT
Start: 2019-12-30 | End: 2020-04-17

## 2019-12-30 NOTE — PROGRESS NOTES
Transplant Infectious Disease Clinic Staff Note: Ms. Blue was seen, examined, and the case was discussed with Dr. Aguilera, ID Fellow -- I agree with her interval history and examination, assessment and plan in this outpatient Transplant ID Progress note. This note reflects my observations and opinions and the plan outlined fully reflects my approach. I have reviewed the available history, radiology, laboratory results, and reports with the Fellow.

## 2019-12-31 ENCOUNTER — TELEPHONE (OUTPATIENT)
Dept: TRANSPLANT | Facility: CLINIC | Age: 59
End: 2019-12-31

## 2019-12-31 NOTE — TELEPHONE ENCOUNTER
DATE:  12/31/2019   TIME OF RECEIPT FROM LAB:  11:17 AM  LAB TEST:  Alkaline Phosphatase  LAB VALUE:  397  RESULTS GIVEN WITH READ-BACK TO (PROVIDER):  Mikayla Latham RN  TIME LAB VALUE REPORTED TO PROVIDER:   11:21 AM

## 2020-01-07 ENCOUNTER — TRANSFERRED RECORDS (OUTPATIENT)
Dept: HEALTH INFORMATION MANAGEMENT | Facility: CLINIC | Age: 60
End: 2020-01-07

## 2020-01-07 ENCOUNTER — TELEPHONE (OUTPATIENT)
Dept: TRANSPLANT | Facility: CLINIC | Age: 60
End: 2020-01-07

## 2020-01-07 DIAGNOSIS — Z94.2 LUNG REPLACED BY TRANSPLANT (H): ICD-10-CM

## 2020-01-07 LAB
ALT SERPL-CCNC: 11 U/L (ref 0–55)
AST SERPL-CCNC: 19 U/L (ref 5–34)

## 2020-01-07 PROCEDURE — 80197 ASSAY OF TACROLIMUS: CPT | Performed by: INTERNAL MEDICINE

## 2020-01-07 NOTE — TELEPHONE ENCOUNTER
DATE:  1/7/2020   TIME OF RECEIPT FROM LAB:  12:58   LAB TEST:  Alk Phos  LAB VALUE:  398  RESULTS GIVEN WITH READ-BACK TO (PROVIDER):  Txyo Latham @ 1:07   TIME LAB VALUE REPORTED TO PROVIDER:   11:10

## 2020-01-08 ENCOUNTER — APPOINTMENT (OUTPATIENT)
Dept: MEDSURG UNIT | Facility: CLINIC | Age: 60
End: 2020-01-08
Attending: INTERNAL MEDICINE
Payer: COMMERCIAL

## 2020-01-08 ENCOUNTER — HOSPITAL ENCOUNTER (OUTPATIENT)
Facility: CLINIC | Age: 60
Discharge: HOME | End: 2020-01-08
Attending: INTERNAL MEDICINE | Admitting: INTERNAL MEDICINE
Payer: COMMERCIAL

## 2020-01-08 ENCOUNTER — APPOINTMENT (OUTPATIENT)
Dept: INTERVENTIONAL RADIOLOGY/VASCULAR | Facility: CLINIC | Age: 60
End: 2020-01-08
Attending: INTERNAL MEDICINE
Payer: COMMERCIAL

## 2020-01-08 VITALS
TEMPERATURE: 98.1 F | SYSTOLIC BLOOD PRESSURE: 105 MMHG | RESPIRATION RATE: 16 BRPM | HEIGHT: 64 IN | BODY MASS INDEX: 21.17 KG/M2 | OXYGEN SATURATION: 94 % | HEART RATE: 82 BPM | WEIGHT: 124 LBS | DIASTOLIC BLOOD PRESSURE: 68 MMHG

## 2020-01-08 DIAGNOSIS — K76.6 PORTAL HYPERTENSION (H): ICD-10-CM

## 2020-01-08 LAB
B-HCG SERPL-ACNC: 6 IU/L (ref 0–5)
BASOPHILS # BLD AUTO: 0 10E9/L (ref 0–0.2)
BASOPHILS NFR BLD AUTO: 0.7 %
DIFFERENTIAL METHOD BLD: ABNORMAL
EOSINOPHIL # BLD AUTO: 0 10E9/L (ref 0–0.7)
EOSINOPHIL NFR BLD AUTO: 0.5 %
ERYTHROCYTE [DISTWIDTH] IN BLOOD BY AUTOMATED COUNT: 14.1 % (ref 10–15)
HCT VFR BLD AUTO: 31.5 % (ref 35–47)
HGB BLD-MCNC: 9.8 G/DL (ref 11.7–15.7)
IMM GRANULOCYTES # BLD: 0 10E9/L (ref 0–0.4)
IMM GRANULOCYTES NFR BLD: 0.4 %
INR PPP: 1.05 (ref 0.86–1.14)
LYMPHOCYTES # BLD AUTO: 0.7 10E9/L (ref 0.8–5.3)
LYMPHOCYTES NFR BLD AUTO: 13.3 %
MCH RBC QN AUTO: 31.2 PG (ref 26.5–33)
MCHC RBC AUTO-ENTMCNC: 31.1 G/DL (ref 31.5–36.5)
MCV RBC AUTO: 100 FL (ref 78–100)
MONOCYTES # BLD AUTO: 0.8 10E9/L (ref 0–1.3)
MONOCYTES NFR BLD AUTO: 14.7 %
NEUTROPHILS # BLD AUTO: 3.9 10E9/L (ref 1.6–8.3)
NEUTROPHILS NFR BLD AUTO: 70.4 %
NRBC # BLD AUTO: 0 10*3/UL
NRBC BLD AUTO-RTO: 0 /100
PLATELET # BLD AUTO: 119 10E9/L (ref 150–450)
RBC # BLD AUTO: 3.14 10E12/L (ref 3.8–5.2)
TACROLIMUS BLD-MCNC: 10.6 UG/L (ref 5–15)
TME LAST DOSE: NORMAL H
WBC # BLD AUTO: 5.6 10E9/L (ref 4–11)

## 2020-01-08 PROCEDURE — 88313 SPECIAL STAINS GROUP 2: CPT | Performed by: HOSPITALIST

## 2020-01-08 PROCEDURE — 85610 PROTHROMBIN TIME: CPT | Performed by: RADIOLOGY

## 2020-01-08 PROCEDURE — 27210808 ZZH SHEATH CR7

## 2020-01-08 PROCEDURE — 27210908 ZZH NEEDLE CR4

## 2020-01-08 PROCEDURE — 84702 CHORIONIC GONADOTROPIN TEST: CPT | Performed by: RADIOLOGY

## 2020-01-08 PROCEDURE — 25000125 ZZHC RX 250: Performed by: RADIOLOGY

## 2020-01-08 PROCEDURE — 25000128 H RX IP 250 OP 636: Performed by: RADIOLOGY

## 2020-01-08 PROCEDURE — C1769 GUIDE WIRE: HCPCS

## 2020-01-08 PROCEDURE — 85025 COMPLETE CBC W/AUTO DIFF WBC: CPT | Performed by: RADIOLOGY

## 2020-01-08 PROCEDURE — 49083 ABD PARACENTESIS W/IMAGING: CPT

## 2020-01-08 PROCEDURE — 40000167 ZZH STATISTIC PP CARE STAGE 2

## 2020-01-08 PROCEDURE — 25500064 ZZH RX 255 OP 636: Performed by: RADIOLOGY

## 2020-01-08 PROCEDURE — 25000128 H RX IP 250 OP 636: Performed by: PHYSICIAN ASSISTANT

## 2020-01-08 PROCEDURE — 99153 MOD SED SAME PHYS/QHP EA: CPT

## 2020-01-08 PROCEDURE — 99152 MOD SED SAME PHYS/QHP 5/>YRS: CPT

## 2020-01-08 PROCEDURE — 27211039 ZZH NEEDLE CR2

## 2020-01-08 PROCEDURE — 88307 TISSUE EXAM BY PATHOLOGIST: CPT | Performed by: HOSPITALIST

## 2020-01-08 PROCEDURE — 27210732 ZZH ACCESSORY CR1

## 2020-01-08 PROCEDURE — 27210888 ZZH ACCESSORY CR7

## 2020-01-08 PROCEDURE — 75970 VASCULAR BIOPSY: CPT

## 2020-01-08 PROCEDURE — 27210777 ZZH KIT CR15

## 2020-01-08 RX ORDER — FENTANYL CITRATE 50 UG/ML
25-50 INJECTION, SOLUTION INTRAMUSCULAR; INTRAVENOUS EVERY 5 MIN PRN
Status: DISCONTINUED | OUTPATIENT
Start: 2020-01-08 | End: 2020-01-08 | Stop reason: HOSPADM

## 2020-01-08 RX ORDER — DEXTROSE MONOHYDRATE 25 G/50ML
25-50 INJECTION, SOLUTION INTRAVENOUS
Status: DISCONTINUED | OUTPATIENT
Start: 2020-01-08 | End: 2020-01-08 | Stop reason: HOSPADM

## 2020-01-08 RX ORDER — FLUMAZENIL 0.1 MG/ML
0.2 INJECTION, SOLUTION INTRAVENOUS
Status: DISCONTINUED | OUTPATIENT
Start: 2020-01-08 | End: 2020-01-08 | Stop reason: HOSPADM

## 2020-01-08 RX ORDER — LIDOCAINE 40 MG/G
CREAM TOPICAL
Status: DISCONTINUED | OUTPATIENT
Start: 2020-01-08 | End: 2020-01-08 | Stop reason: HOSPADM

## 2020-01-08 RX ORDER — SODIUM CHLORIDE 9 MG/ML
INJECTION, SOLUTION INTRAVENOUS CONTINUOUS
Status: DISCONTINUED | OUTPATIENT
Start: 2020-01-08 | End: 2020-01-08 | Stop reason: HOSPADM

## 2020-01-08 RX ORDER — NICOTINE POLACRILEX 4 MG
15-30 LOZENGE BUCCAL
Status: DISCONTINUED | OUTPATIENT
Start: 2020-01-08 | End: 2020-01-08 | Stop reason: HOSPADM

## 2020-01-08 RX ORDER — ONDANSETRON 2 MG/ML
4 INJECTION INTRAMUSCULAR; INTRAVENOUS ONCE
Status: COMPLETED | OUTPATIENT
Start: 2020-01-08 | End: 2020-01-08

## 2020-01-08 RX ORDER — ACETAMINOPHEN 500 MG
500 TABLET ORAL EVERY 6 HOURS PRN
Status: DISCONTINUED | OUTPATIENT
Start: 2020-01-08 | End: 2020-01-08 | Stop reason: HOSPADM

## 2020-01-08 RX ORDER — IODIXANOL 320 MG/ML
100 INJECTION, SOLUTION INTRAVASCULAR ONCE
Status: COMPLETED | OUTPATIENT
Start: 2020-01-08 | End: 2020-01-08

## 2020-01-08 RX ORDER — NALOXONE HYDROCHLORIDE 0.4 MG/ML
.1-.4 INJECTION, SOLUTION INTRAMUSCULAR; INTRAVENOUS; SUBCUTANEOUS
Status: DISCONTINUED | OUTPATIENT
Start: 2020-01-08 | End: 2020-01-08 | Stop reason: HOSPADM

## 2020-01-08 RX ORDER — ONDANSETRON 2 MG/ML
4 INJECTION INTRAMUSCULAR; INTRAVENOUS EVERY 6 HOURS PRN
Status: DISCONTINUED | OUTPATIENT
Start: 2020-01-08 | End: 2020-01-08 | Stop reason: HOSPADM

## 2020-01-08 RX ADMIN — ONDANSETRON 4 MG: 2 INJECTION INTRAMUSCULAR; INTRAVENOUS at 11:55

## 2020-01-08 RX ADMIN — IODIXANOL 15 ML: 320 INJECTION, SOLUTION INTRAVASCULAR at 13:02

## 2020-01-08 RX ADMIN — FENTANYL CITRATE 50 MCG: 50 INJECTION, SOLUTION INTRAMUSCULAR; INTRAVENOUS at 12:26

## 2020-01-08 RX ADMIN — MIDAZOLAM 1 MG: 1 INJECTION INTRAMUSCULAR; INTRAVENOUS at 12:12

## 2020-01-08 RX ADMIN — LIDOCAINE HYDROCHLORIDE 6 ML: 10 INJECTION, SOLUTION EPIDURAL; INFILTRATION; INTRACAUDAL; PERINEURAL at 12:09

## 2020-01-08 RX ADMIN — ONDANSETRON 4 MG: 2 INJECTION INTRAMUSCULAR; INTRAVENOUS at 12:29

## 2020-01-08 RX ADMIN — MIDAZOLAM 1 MG: 1 INJECTION INTRAMUSCULAR; INTRAVENOUS at 12:04

## 2020-01-08 RX ADMIN — FENTANYL CITRATE 50 MCG: 50 INJECTION, SOLUTION INTRAMUSCULAR; INTRAVENOUS at 12:45

## 2020-01-08 RX ADMIN — FENTANYL CITRATE 50 MCG: 50 INJECTION, SOLUTION INTRAMUSCULAR; INTRAVENOUS at 12:13

## 2020-01-08 RX ADMIN — FENTANYL CITRATE 50 MCG: 50 INJECTION, SOLUTION INTRAMUSCULAR; INTRAVENOUS at 12:05

## 2020-01-08 ASSESSMENT — MIFFLIN-ST. JEOR: SCORE: 1132.71

## 2020-01-08 NOTE — PROGRESS NOTES
Returned to unit following transcatheter liver biopsy and paracentesis. Both dressing dry and in place. Received sedation. Alert and orient. No complaint of nausea or discomfort. Vital signs within normal limits. Oxygen/nasal cannula started due to decreased oxygenation. O2 sat 88-90's.

## 2020-01-08 NOTE — DISCHARGE INSTRUCTIONS
[facility]      Interventional Radiology  Patient Instructions Following Paracentesis    AFTER YOU GO HOME:  ? If you were given sedation; we recommend that an adult stay with you for the first 24 hours.   No driving or alcoholic beverages for 24 hours.  ? Resume previous diet and medication  ? Limit strenuous physical activity such as lifting (>10 lbs.), straining, or exercising for 48 hours.  You may resume normal activity in 24 hours  ? Change gauze at the puncture site as needed to keep site dry.  Leaking of additional fluid is not uncommon during the first 24-48 hours.    CALL 911:  ? If this is a medical emergency    CALL THE PHYSICIAN:  ? If you develop a fever, severe abdominal pain or excessive bleeding from the paracentesis site within 24 hours of the procedure  ? If you experience severe lightheadedness or fainting, call you doctor immediately or come to the closest Emergency Department.  Do not drive yourself.  ? If you have questions or concerns regarding your procedure call the Radiologist           Discharge Instructions for Liver Biopsy  Because you had anesthesia, you should not drive until the day after your biopsy.   Remove the bandage covering the biopsy site 48 hours after the procedure.  Rest for 6 hours and take it easy when you arrive home.  Don t shower for 24 hours after the biopsy. If you wish, you may wash yourself with a sponge or washcloth. When you are able to shower, don t scrub the site. Gently wash the area and pat it dry.  Don t lift anything heavier than 10 pounds for up to 1 week after the procedure, or as advised by your healthcare provider.  Don't do strenuous activities or exercises for up to 1 week after the procedure.    When to call your healthcare provider  Call your healthcare provider immediately if you have any of the following:  Bleeding from the biopsy site  Dizziness or lightheadedness  Sudden or increased shortness of breath  Sudden chest  pain  Fever of 100.4 F (38.0 C) or higher, or as directed by your healthcare provider  Shaking chills  Yellow eyes or skin  Increasing redness, tenderness, or swelling at the biopsy site  Drainage from the biopsy site.  Opening of the biopsy site  Vomiting blood  Rectal bleeding or bloody stools Increasing pain, with or without activity, in the liver or belly area, or pain shooting to the right shoulder       [facility] INTERVENTIONAL RADIOLOGY DEPARTMENT  Procedure Physician:          Dr. Bryant                         Date of procedure: 1/8/2020    Telephone Numbers: [phone] Monday-Friday 8:00 am to 4:30 pm  [phone] After 4:30 pm Monday-Friday, Weekends & Holidays.   Ask for the Interventional Radiologist on call.  Someone is on call 24 hrs/day  [facility] toll free number: [phone] Monday-Friday 8:00 am to 4:30 pm  [facility] Emergency Dept: [phone]

## 2020-01-08 NOTE — PROGRESS NOTES
Arrived from home for a liver biopsy.  VSS.  Denies pain.  PIV placed and labs sent.  Needs consent.  H&P current.  Resting in bed.  Requesting a paracentesis today if possible as well.  MD notified of such.

## 2020-01-08 NOTE — PROGRESS NOTES
Patient Name: Kceia Blue  Medical Record Number: [id number]  Today's Date: 1/8/2020    Procedure: Transjugular liver biopsy  Proceduralist: Dr. Tressa London.    Sedation start time: 1156  Sedation end time: 1301  Sedation medications administered: 2 mg Versed, 200 mcg Fentanyl.  Total sedation time: 65 minutes    Patient in room: 1135  Procedure start time: 1216  Puncture time: 1217  Procedure end time: 1301  Patient out of room: 1309     Report given to: 2.A. R.N.     Other Notes: Pt arrived to IR room 5 from 2.A.. Consent reviewed. Pt denies any questions or concerns regarding procedure. Pt positioned supine and monitored per protocol. Pt tolerated procedure without any noted complications. Pt transferred back to .    Paracentesis fluid removed: 3000cc

## 2020-01-08 NOTE — PROGRESS NOTES
Patient tolerated recovery stage well. VSS, right neck site clean/dry/intact, no hematoma, and denies pain. Patient tolerated PO food and fluids. Teaching was done and discharge instructions were given. Patient ambulated, voided, and PIV was removed. Patient discharged from the hospital via wheel chair to home with family @[id number].

## 2020-01-08 NOTE — PROCEDURES
[facility], [place]    Procedure: IR Procedure Note  Date/Time: 1/8/2020 12:58 PM  Performed by: Tressa Bryant MD  Authorized by: Tressa Bryant MD   IR Fellow Physician: Tressa Bryant MD  Other(s) attending procedure: IR staff: Iwona Fields MD    UNIVERSAL PROTOCOL   Site Marked: NA  Prior Images Obtained and Reviewed:  Yes  Required items: Required blood products, implants, devices and special equipment available    Patient identity confirmed:  Verbally with patient, arm band, provided demographic data and hospital-assigned identification number  Patient was reevaluated immediately before administering moderate or deep sedation or anesthesia  Confirmation Checklist:  Patient's identity using two indicators, relevant allergies, procedure was appropriate and matched the consent or emergent situation and correct equipment/implants were available  Time out: Immediately prior to the procedure a time out was called    Universal Protocol: the Joint Commission Universal Protocol was followed    Preparation: Patient was prepped and draped in usual sterile fashion           ANESTHESIA    Anesthesia: Local infiltration  Local Anesthetic:  Lidocaine 1% without epinephrine  Anesthetic Total (mL):  10      SEDATION    Sedation Type:  Moderate (conscious) sedation  Sedation:  Fentanyl and midazolam  See dictated procedure note for full details.  Findings: 1. Ultrasound-guided paracentesis with 3 liters yellow fluid removed.  2. Transjugular liver biopsy with pressure measurements.    Specimens: none    Complications: None    Condition: Stable    Plan: 2 hours bedrest.  Discharge thereafter.      PROCEDURE   Patient Tolerance:  Patient tolerated the procedure well with no immediate complications    Length of time physician/provider present for 1:1 monitoring during sedation: 60

## 2020-01-08 NOTE — IP AVS SNAPSHOT
MRN:[id number]                      After Visit Summary   1/8/2020    Kecia Blue    MRN: [id number]           Visit Information        Department      1/8/2020  8:39 AM [address] [facility] [place]          Review of your medicines      UNREVIEWED medicines. Ask your doctor about these medicines       Dose / Directions   calcium carbonate 600 mg-vitamin D 400 units 600-400 MG-UNIT per tablet  Commonly known as:  CALTRATE  Used for:  S/P lung transplant (H), ILD (interstitial lung disease) (H), Lung transplant recipient (H), Encounter for long-term (current) use of high-risk medication, Anemia, unspecified type, Cytomegalovirus (CMV) viremia (H)      Dose:  1 tablet  Take 1 tablet by mouth daily  Refills:  0     carvedilol 25 MG tablet  Commonly known as:  COREG  Used for:  Essential hypertension      Dose:  25 mg  Take 1 tablet (25 mg) by mouth 2 times daily (with meals)  Quantity:  60 tablet  Refills:  11     dapsone 100 MG tablet  Commonly known as:  ACZONE  Used for:  S/P lung transplant (H), Administration of long-term prophylactic antibiotics      Dose:  100 mg  Take 1 tablet (100 mg) by mouth daily  Quantity:  30 tablet  Refills:  11     ferrous sulfate 325 (65 Fe) MG tablet  Commonly known as:  FEROSUL  Used for:  S/P lung transplant (H)      Dose:  325 mg  Take 1 tablet (325 mg) by mouth daily (with breakfast)  Quantity:  60 tablet  Refills:  2     magnesium oxide 400 MG tablet  Commonly known as:  MAG-OX      Dose:  400 mg  Take 400 mg by mouth daily  Refills:  0     multivitamin w/minerals tablet  Used for:  Lung transplant recipient (H)      Dose:  1 tablet  Take 1 tablet by mouth daily  Quantity:  30 each  Refills:  11     ondansetron 4 MG tablet  Commonly known as:  ZOFRAN  Used for:  Intractable vomiting with nausea, unspecified vomiting type      Dose:  4 mg  Take 1 tablet (4 mg) by mouth every 12 hours as needed for nausea  Quantity:  60 tablet  Refills:  0     pantoprazole 40 MG  EC tablet  Commonly known as:  PROTONIX  Used for:  Gastroesophageal reflux disease without esophagitis, ILD (interstitial lung disease) (H), Lung transplant recipient (H)      Dose:  40 mg  Take 1 tablet (40 mg) by mouth 2 times daily  Quantity:  60 tablet  Refills:  11     predniSONE 2.5 MG tablet  Commonly known as:  DELTASONE  Used for:  Lung replaced by transplant (H)      Take two tablets (5mg) every AM and one tablet (2.5mg) every evening  Quantity:  90 tablet  Refills:  11     senna-docusate 8.6-50 MG tablet  Commonly known as:  SENOKOT-S/PERICOLACE  Used for:  Constipation, unspecified constipation type      Dose:  2 tablet  Take 2 tablets by mouth 2 times daily as needed for constipation  Quantity:  60 tablet  Refills:  0     * tacrolimus 1 MG capsule  Commonly known as:  GENERIC EQUIVALENT  Used for:  S/P lung transplant (H)      Dose:  1 mg  Take 1 capsule (1 mg) by mouth every morning Total dose: 1 mg in the AM and 0.5 mg in the PM  Quantity:  30 capsule  Refills:  11     * tacrolimus 0.5 MG capsule  Commonly known as:  GENERIC EQUIVALENT  Used for:  S/P lung transplant (H)      Dose:  0.5 mg  Take 1 capsule (0.5 mg) by mouth every evening Total dose: 1 mg in the AM and 0.5 mg in the PM  Quantity:  30 capsule  Refills:  11     * voriconazole 200 MG tablet  Commonly known as:  VFEND  Used for:  Lung replaced by transplant (H)      Dose:  200 mg  Take 1 tablet (200 mg) by mouth 2 times daily TOTAL DOSE: 250 mg every 12 hours  Quantity:  60 tablet  Refills:  3     * voriconazole 50 MG tablet  Commonly known as:  VFEND  Used for:  Lung replaced by transplant (H)      Dose:  50 mg  Take 1 tablet (50 mg) by mouth 2 times daily TOTAL DOSE: 250 mg every 12 hours  Quantity:  60 tablet  Refills:  3         * This list has 4 medication(s) that are the same as other medications prescribed for you. Read the directions carefully, and ask your doctor or other care provider to review them with you.                   Protect others around you: Learn how to safely use, store and throw away your medicines at www.disposemymeds.org.       Follow-ups after your visit       Your next 10 appointments already scheduled    Feb 19, 2020  7:00 AM CST  XR CHEST 2 VIEWS with UCXR1  [facility] Xray ([facility]) [address] [id number] [phone]   How do I prepare for my exam? (Food and drink instructions)  No Food and Drink Restrictions.    How do I prepare for my exam? (Other instructions)  You do not need to do anything special for this exam.    What should I wear: Wear comfortable clothes.    How long does the exam take: Most scans take less than 5 minutes.    What should I bring: Bring a list of your medicines, including vitamins, minerals and over-the-counter drugs. It is safest to leave personal items at home.    Do I need a : No  is needed.    What do I need to tell my doctor: Tell your doctor if there s any chance you are pregnant.    What should I do after the exam: No restrictions, You may resume normal activities.    What is this test: An image of a specific body part shown in shades of black and white.    Who should I call with questions: If you have any questions, please call the Imaging Department where you will have your exam. Directions, parking instructions, and other information is available on our website, [email or web address].     Feb 19, 2020  7:15 AM CST  Lab with [facility]   Health Lab ([facility]) [address] [place] [id number] [phone]      Feb 19, 2020  8:00 AM CST  Six Minute Walk with  PFL 6 MINUTE WALK 1  [facility] Pulmonary Function Testing ([facility]) [address] [place] [id number] [phone]      Feb 19, 2020  8:30 AM CST  PFT VISIT with  PFL [facility] Pulmonary Function Testing ([facility]) [address] [address]  [phone]      Feb 19, 2020  9:20 AM CST  (Arrive by 9:05 AM)  Return Lung Transplant with Srinivas Mcelroy MD  [facility] Solid Organ Transplant ([facility] [place]) [address] [place] [id number]  [phone]         Care Instructions       Further instructions from your care team       [facility]      Interventional Radiology  Patient Instructions Following Paracentesis    AFTER YOU GO HOME:  ? If you were given sedation; we recommend that an adult stay with you for the first 24 hours.   No driving or alcoholic beverages for 24 hours.  ? Resume previous diet and medication  ? Limit strenuous physical activity such as lifting (>10 lbs.), straining, or exercising for 48 hours.  You may resume normal activity in 24 hours  ? Change gauze at the puncture site as needed to keep site dry.  Leaking of additional fluid is not uncommon during the first 24-48 hours.    CALL 911:  ? If this is a medical emergency    CALL THE PHYSICIAN:  ? If you develop a fever, severe abdominal pain or excessive bleeding from the paracentesis site within 24 hours of the procedure  ? If you experience severe lightheadedness or fainting, call you doctor immediately or come to the closest Emergency Department.  Do not drive yourself.  ? If you have questions or concerns regarding your procedure call the Radiologist           Discharge Instructions for Liver Biopsy  Because you had anesthesia, you should not drive until the day after your biopsy.   Remove the bandage covering the biopsy site 48 hours after the procedure.  Rest for 6 hours and take it easy when you arrive home.  Don t shower for 24 hours after the biopsy. If you wish, you may wash yourself with a sponge or washcloth. When you are able to shower, don t scrub the site. Gently wash the area and pat it dry.  Don t lift anything heavier  than 10 pounds for up to 1 week after the procedure, or as advised by your healthcare provider.  Don't do strenuous activities or exercises for up to 1 week after the procedure.    When to call your healthcare provider  Call your healthcare provider immediately if you have any of the following:  Bleeding from the biopsy site  Dizziness or lightheadedness  Sudden or increased shortness of breath  Sudden chest pain  Fever of 100.4 F (38.0 C) or higher, or as directed by your healthcare provider  Shaking chills  Yellow eyes or skin  Increasing redness, tenderness, or swelling at the biopsy site  Drainage from the biopsy site.  Opening of the biopsy site  Vomiting blood  Rectal bleeding or bloody stools Increasing pain, with or without activity, in the liver or belly area, or pain shooting to the right shoulder       [facility] INTERVENTIONAL RADIOLOGY DEPARTMENT  Procedure Physician:          Dr. Bryant                         Date of procedure: 1/8/2020    Telephone Numbers: [phone] Monday-Friday 8:00 am to 4:30 pm  [phone] After 4:30 pm Monday-Friday, Weekends & Holidays.   Ask for the Interventional Radiologist on call.  Someone is on call 24 hrs/day  [facility] toll free number: [phone] Monday-Friday 8:00 am to 4:30 pm  [facility] Emergency Dept: [phone]          Additional Information About Your Visit       [facility] Information    [facility] gives you secure access to your electronic health record. If you see a primary care provider, you can also send messages to your care team and make appointments. If you have questions, please call your primary care clinic.  If you do not have a primary care provider, please call [phone] and they will assist you.       Care EveryWhere ID    This is your Care EveryWhere ID. This could be used by other organizations to access your [facility] medical records  [id number]       Your Vitals Were  Most recent update: 1/8/2020  1:51 PM    Blood Pressure   100/61          Pulse  "  84          Temperature   98.1  F (36.7  C) (Oral)          Respirations   16          Height   1.626 m (5' 4.02\")             Weight   56.2 kg (124 lb)    Last Period   06/07/2014 (Exact Date)    Pulse Oximetry   93%    BMI (Body Mass Index)   21.27 kg/m           Primary Care Provider Office Phone # Fax #    Rosy JUAN Vu [phone]      Equal Access to Services    ALBAN VALENCIA : Hadii aad ku hadasho Soomaali, waaxda luqadaha, qaybta kaalmada adeegyada, waxay yoliin haydionyn tammy meadowslinwoodofelia daily . So [facility] [phone].    ATENCIÓN: Si habla español, tiene a taylor disposición servicios gratuitos de asistencia lingüística. [facility] [phone].    We comply with applicable federal and state civil rights laws, including the [place] Human Rights Act. We do not discriminate on the basis of race, color, creed, Temple, national origin, marital status, age, disability, sex, sexual orientation, or gender identity.       Thank you!    Thank you for choosing [facility] for your care. Our goal is always to provide you with excellent care. Hearing back from our patients is one way we can continue to improve our services. Please take a few minutes to complete the written survey that you may receive in the mail after you visit with us. Thank you!            Medication List      ASK your doctor about these medications          Morning Afternoon Evening Bedtime As Needed    calcium carbonate 600 mg-vitamin D 400 units 600-400 MG-UNIT per tablet  Also known as:  CALTRATE  INSTRUCTIONS:  Take 1 tablet by mouth daily                     carvedilol 25 MG tablet  Also known as:  COREG  INSTRUCTIONS:  Take 1 tablet (25 mg) by mouth 2 times daily (with meals)                     dapsone 100 MG tablet  Also known as:  ACZONE  INSTRUCTIONS:  Take 1 tablet (100 mg) by mouth daily                     ferrous sulfate 325 (65 Fe) MG tablet  Also known as:  FEROSUL  INSTRUCTIONS:  Take 1 tablet (325 mg) by mouth daily " (with breakfast)                     magnesium oxide 400 MG tablet  Also known as:  MAG-OX  INSTRUCTIONS:  Take 400 mg by mouth daily                     multivitamin w/minerals tablet  INSTRUCTIONS:  Take 1 tablet by mouth daily                     ondansetron 4 MG tablet  Also known as:  ZOFRAN  INSTRUCTIONS:  Take 1 tablet (4 mg) by mouth every 12 hours as needed for nausea  LAST TAKEN:  Ask your nurse or doctor                     pantoprazole 40 MG EC tablet  Also known as:  PROTONIX  INSTRUCTIONS:  Take 1 tablet (40 mg) by mouth 2 times daily                     predniSONE 2.5 MG tablet  Also known as:  DELTASONE  INSTRUCTIONS:  Take two tablets (5mg) every AM and one tablet (2.5mg) every evening                     senna-docusate 8.6-50 MG tablet  Also known as:  SENOKOT-S/PERICOLACE  INSTRUCTIONS:  Take 2 tablets by mouth 2 times daily as needed for constipation                     * tacrolimus 1 MG capsule  Also known as:  GENERIC EQUIVALENT  INSTRUCTIONS:  Take 1 capsule (1 mg) by mouth every morning Total dose: 1 mg in the AM and 0.5 mg in the PM  Doctor's comments:  Dose change                     * tacrolimus 0.5 MG capsule  Also known as:  GENERIC EQUIVALENT  INSTRUCTIONS:  Take 1 capsule (0.5 mg) by mouth every evening Total dose: 1 mg in the AM and 0.5 mg in the PM                     * voriconazole 200 MG tablet  Also known as:  VFEND  INSTRUCTIONS:  Take 1 tablet (200 mg) by mouth 2 times daily TOTAL DOSE: 250 mg every 12 hours                     * voriconazole 50 MG tablet  Also known as:  VFEND  INSTRUCTIONS:  Take 1 tablet (50 mg) by mouth 2 times daily TOTAL DOSE: 250 mg every 12 hours                        * This list has 4 medication(s) that are the same as other medications prescribed for you. Read the directions carefully, and ask your doctor or other care provider to review them with you.

## 2020-01-08 NOTE — PRE-PROCEDURE
GENERAL PRE-PROCEDURE:   Procedure:  Transjugular liver biopsy    Written consent obtained?: Yes    Risks and benefits: Risks, benefits and alternatives were discussed    Consent given by:  Patient  Patient states understanding of procedure being performed: Yes    Patient's understanding of procedure matches consent: Yes    Procedure consent matches procedure scheduled: Yes    Expected level of sedation:  Moderate  Appropriately NPO:  Yes  ASA Class:  Class 3- Severe systemic disease, definite functional limitations  Mallampati  :  Grade 2- soft palate, base of uvula, tonsillar pillars, and portion of posterior pharyngeal wall visible  Lungs:  Lungs clear with good breath sounds bilaterally  Heart:  Normal heart sounds and rate  History & Physical reviewed:  History and physical reviewed and no updates needed  Statement of review:  I have reviewed the lab findings, diagnostic data, medications, and the plan for sedation

## 2020-01-08 NOTE — IP AVS SNAPSHOT
[address] [address]                                    After Visit Summary   1/8/2020    Kecia Blue    MRN: [id number]           After Visit Summary Signature Page    I have received my discharge instructions, and my questions have been answered. I have discussed any challenges I see with this plan with the nurse or doctor.    ..........................................................................................................................................  Patient/Patient Representative Signature      ..........................................................................................................................................  Patient Representative Print Name and Relationship to Patient    ..................................................               ................................................  Date                                   Time    ..........................................................................................................................................  Reviewed by Signature/Title    ...................................................              ..............................................  Date                                               Time          22EPIC Rev 08/18

## 2020-01-09 ENCOUNTER — TELEPHONE (OUTPATIENT)
Dept: TRANSPLANT | Facility: CLINIC | Age: 60
End: 2020-01-09

## 2020-01-09 LAB — COPATH REPORT: NORMAL

## 2020-01-09 NOTE — TELEPHONE ENCOUNTER
Patient calls reporting her CVC for dialysis is not working and needs to be replaced. She is inquiring if it should be replaced at [facility] IR or in [place]. Preference to [facility], but if [place] has sooner opening, that would be fine. She reports her dialysis center is currently making some calls, will keep me updated with final plan.     Update: Patient called and also per care everywhere, arranged for catheter revision at [facility] later today and will dialyze for a shorter run after to ensure it's working well. [facility] was not able to fit patient in tomorrow.

## 2020-01-09 NOTE — RESULT ENCOUNTER NOTE
Armand Mcdonnell, your tacrolimus level was 10.6 at 12 hours on 1/7/2020, close to goal of 8-10. No dose change for now, let's recheck a level again next week and if the level is still high, we may need to decrease your dose.   Mikayla

## 2020-01-15 ENCOUNTER — TELEPHONE (OUTPATIENT)
Dept: TRANSPLANT | Facility: CLINIC | Age: 60
End: 2020-01-15

## 2020-01-15 DIAGNOSIS — Z94.2 LUNG REPLACED BY TRANSPLANT (H): ICD-10-CM

## 2020-01-15 PROCEDURE — 80197 ASSAY OF TACROLIMUS: CPT | Performed by: INTERNAL MEDICINE

## 2020-01-15 NOTE — TELEPHONE ENCOUNTER
DATE:  1/15/2020   TIME OF RECEIPT FROM LAB:  1130  LAB TEST:  Alk Phos  LAB VALUE:  354  RESULTS GIVEN WITH READ-BACK TO (PROVIDER):  Mikayla Latham  TIME LAB VALUE REPORTED TO PROVIDER:   1147

## 2020-01-16 LAB
TACROLIMUS BLD-MCNC: 9.4 UG/L (ref 5–15)
TME LAST DOSE: NORMAL H

## 2020-01-17 NOTE — RESULT ENCOUNTER NOTE
Armand Mcdonnell, your tacrolimus level was 9.4 at 12 hours on 1/15/2020 which is within your goal range of 8-10. No dose change at this time. Please call the transplant office ([phone]) with any questions. Thanks, Mikayla

## 2020-01-21 ENCOUNTER — TELEPHONE (OUTPATIENT)
Dept: TRANSPLANT | Facility: CLINIC | Age: 60
End: 2020-01-21

## 2020-01-21 ENCOUNTER — TRANSFERRED RECORDS (OUTPATIENT)
Dept: HEALTH INFORMATION MANAGEMENT | Facility: CLINIC | Age: 60
End: 2020-01-21

## 2020-01-21 DIAGNOSIS — Z94.2 LUNG REPLACED BY TRANSPLANT (H): ICD-10-CM

## 2020-01-21 PROCEDURE — 80197 ASSAY OF TACROLIMUS: CPT | Performed by: INTERNAL MEDICINE

## 2020-01-21 NOTE — TELEPHONE ENCOUNTER
Paged re: critical alk phos of 436. Called patient and she is feeling fine. Per hepatology, as long as patient is feeling fine, will continue to monitor with weekly labs.     Patient aware of plan. Will continue to work on getting her scheduled for [facility] - will discuss with Dr. Mcelroy.

## 2020-01-21 NOTE — TELEPHONE ENCOUNTER
DATE:  1/21/2020   TIME OF RECEIPT FROM LAB:  11:36a  LAB TEST:  Alk phos  LAB VALUE:  436  RESULTS GIVEN WITH READ-BACK TO (PROVIDER):  Mikayla Latham RN  TIME LAB VALUE REPORTED TO PROVIDER:  11:36a

## 2020-01-22 DIAGNOSIS — R06.02 SHORTNESS OF BREATH: Primary | ICD-10-CM

## 2020-01-22 DIAGNOSIS — K76.6 PORTAL HYPERTENSION (H): ICD-10-CM

## 2020-01-22 DIAGNOSIS — I27.20 PULMONARY HYPERTENSION (H): ICD-10-CM

## 2020-01-22 DIAGNOSIS — R18.8 ASCITES: ICD-10-CM

## 2020-01-22 DIAGNOSIS — Z94.2 S/P LUNG TRANSPLANT (H): Primary | ICD-10-CM

## 2020-01-22 LAB
TACROLIMUS BLD-MCNC: 10 UG/L (ref 5–15)
TME LAST DOSE: NORMAL H

## 2020-01-22 RX ORDER — LIDOCAINE 40 MG/G
CREAM TOPICAL
Status: CANCELLED | OUTPATIENT
Start: 2020-01-22

## 2020-01-23 ENCOUNTER — TELEPHONE (OUTPATIENT)
Dept: TRANSPLANT | Facility: CLINIC | Age: 60
End: 2020-01-23

## 2020-01-23 NOTE — RESULT ENCOUNTER NOTE
Armand Mcdonnell, your tacrolimus level was 10 at 12 hours on 1/21/2020 which is within your goal range of 8-10. No dose change at this time. Please call the transplant office ([phone]) with any questions. Thanks, Mikayla

## 2020-01-23 NOTE — TELEPHONE ENCOUNTER
Patient called earlier today wondering if she would have to continue voriconazole. Per Dr. Aguilera, patient should continue voriconazole until see ID again to see if empyema has improved.   Patient called and in agreement with plan.

## 2020-01-24 NOTE — TELEPHONE ENCOUNTER
amand  RECORDS RECEIVED FROM:    DATE RECEIVED: 02.19.2020   NOTES STATUS DETAILS   OFFICE NOTE from referring provider N/A    OFFICE NOTES from other specialists Internal 12.18.2019     DISCHARGE SUMMARY from hospital Internal 01.08.2020   MEDICATION LIST Internal    LIVER BIOSPY (IF APPLICABLE)      PATHOLOGY REPORTS  Internal 01.08.2020   IMAGING     ENDOSCOPY (IF AVAILABLE) N/A    COLONOSCOPY (IF AVAILABLE) N/A    ULTRASOUND LIVER Internal 10.22.2019  09.11.2019   CT OF ABDOMEN Internal 10.21.2019  08.20.2018  03.08.2018   MRI OF LIVER Internal 09.12.2019     FIBROSCAN, US ELASTOGRAPHY, FIBROSIS SCAN, MR ELASTOGRAPHY N/A    LABS     HEPATIC PANEL (LIVER PANEL) Internal 01.21.2020   BASIC METABOLIC PANEL Care Everywhere 01.21.2020   COMPLETE METABOLIC PANEL N/A    COMPLETE BLOOD COUNT (CBC) Internal 01.21.2020   INTERNATIONAL NORMALIZED RATIO (INR) Internal 01.08.2020   HEPATITIS C ANTIBODY Care Everywhere 10.30.2019   HEPATITIS C VIRAL LOAD/PCR N/A    HEPATITIS C GENOTYPE N/A    HEPATITIS B SURFACE ANTIGEN N/A    HEPATITIS B SURFACE ANTIBODY Care Everywhere 10.30.2019   HEPATITIS B DNA QUANT LEVEL N/A    HEPATITIS B CORE ANTIBODY Care Everywhere 10.30.2019

## 2020-01-28 ENCOUNTER — TELEPHONE (OUTPATIENT)
Dept: TRANSPLANT | Facility: CLINIC | Age: 60
End: 2020-01-28

## 2020-01-28 DIAGNOSIS — Z94.2 LUNG REPLACED BY TRANSPLANT (H): ICD-10-CM

## 2020-01-28 PROCEDURE — 80197 ASSAY OF TACROLIMUS: CPT | Performed by: INTERNAL MEDICINE

## 2020-01-28 NOTE — TELEPHONE ENCOUNTER
DATE:  1/28/2020   TIME OF RECEIPT FROM LAB:  12:53  LAB TEST:  Alk phos  LAB VALUE:  488  RESULTS GIVEN WITH READ-BACK TO (PROVIDER):  Mikayla Latham  TIME LAB VALUE REPORTED TO PROVIDER:   12:58

## 2020-01-29 ENCOUNTER — TELEPHONE (OUTPATIENT)
Dept: TRANSPLANT | Facility: CLINIC | Age: 60
End: 2020-01-29

## 2020-01-29 DIAGNOSIS — J86.9 EMPYEMA OF LUNG (H): ICD-10-CM

## 2020-01-29 DIAGNOSIS — Z94.2 S/P LUNG TRANSPLANT (H): Primary | ICD-10-CM

## 2020-01-29 LAB
TACROLIMUS BLD-MCNC: 10.8 UG/L (ref 5–15)
TME LAST DOSE: NORMAL H

## 2020-01-29 NOTE — TELEPHONE ENCOUNTER
Per Dr. Beasley and Dr. Aguilera, have 3 month follow up with transplant ID and have chest CT prior to appointment.     Called patient, in town 2/18-2/20, would prefer to see transplant ID sooner to discuss anti-fungal treatment.     Orders placed for chest CT, request sent to schedulers.

## 2020-02-04 ENCOUNTER — TELEPHONE (OUTPATIENT)
Dept: TRANSPLANT | Facility: CLINIC | Age: 60
End: 2020-02-04

## 2020-02-04 DIAGNOSIS — K76.6 PORTAL HYPERTENSION (H): Primary | ICD-10-CM

## 2020-02-04 DIAGNOSIS — Z94.2 LUNG REPLACED BY TRANSPLANT (H): ICD-10-CM

## 2020-02-04 DIAGNOSIS — Z94.2 LUNG REPLACED BY TRANSPLANT (H): Primary | ICD-10-CM

## 2020-02-04 DIAGNOSIS — R79.89 ELEVATED LIVER FUNCTION TESTS: ICD-10-CM

## 2020-02-04 PROCEDURE — 80197 ASSAY OF TACROLIMUS: CPT | Performed by: INTERNAL MEDICINE

## 2020-02-04 NOTE — TELEPHONE ENCOUNTER
Kecia reports that she is feeling good. Alk phos high at 436. Per liver team ok if pt is feeling well. 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 This ordered and sent to scheduling. Will try to have pt seen in ID that same day.

## 2020-02-04 NOTE — TELEPHONE ENCOUNTER
DATE:  2/4/2020   TIME OF RECEIPT FROM LAB:  1300  LAB TEST:  Alk phos  LAB VALUE:  525  RESULTS GIVEN WITH READ-BACK TO:Mikayla Latham  TIME LAB VALUE REPORTED TO PROVIDER:   1307

## 2020-02-05 LAB
TACROLIMUS BLD-MCNC: 9.1 UG/L (ref 5–15)
TME LAST DOSE: NORMAL H

## 2020-02-05 NOTE — RESULT ENCOUNTER NOTE
Tacrolimus level 9.1 at 12 hours, on 2/4/20  Goal 8-10.   Current dose 1 mg in AM, 0.5 mg in PM    Continue this dose at goal!    [employer] message sent

## 2020-02-06 ENCOUNTER — TELEPHONE (OUTPATIENT)
Dept: TRANSPLANT | Facility: CLINIC | Age: 60
End: 2020-02-06

## 2020-02-06 DIAGNOSIS — Z94.2 LUNG REPLACED BY TRANSPLANT (H): Primary | ICD-10-CM

## 2020-02-06 DIAGNOSIS — J86.9 EMPYEMA OF LUNG (H): ICD-10-CM

## 2020-02-06 NOTE — TELEPHONE ENCOUNTER
Discussed patient's follow up with ID with Dr. Aguilera:    I'm not sure that she needs to see anyone if not convenient with timing. I'm primarily interested in reviewing her scans.  If she can get a CT, I'm happy to review.  If the pleural findings are completely resolved, we potentially could stop.  More likely, I hope they will be improved but not completely better, in which case I would just continue her on vori and re-image again in ~3 months.     Called patient with updated plan. Last scan 12/19 but patient would like to be scanned when in town on 2/19. Will schedule patient for CT when in town on 2/19 and review with Dr. Aguilera.

## 2020-02-11 ENCOUNTER — TELEPHONE (OUTPATIENT)
Dept: TRANSPLANT | Facility: CLINIC | Age: 60
End: 2020-02-11

## 2020-02-11 DIAGNOSIS — Z94.2 LUNG REPLACED BY TRANSPLANT (H): ICD-10-CM

## 2020-02-11 PROCEDURE — 80197 ASSAY OF TACROLIMUS: CPT | Performed by: INTERNAL MEDICINE

## 2020-02-11 NOTE — TELEPHONE ENCOUNTER
DATE:  2/11/2020   TIME OF RECEIPT FROM LAB:  11:28 AM  LAB TEST:  Alk Phos  LAB VALUE:  475  RESULTS GIVEN WITH READ-BACK TO (PROVIDER):  Mikayla Latham RN  TIME LAB VALUE REPORTED TO PROVIDER:   11:32 AM

## 2020-02-13 LAB
TACROLIMUS BLD-MCNC: 8.8 UG/L (ref 5–15)
TME LAST DOSE: NORMAL H

## 2020-02-13 NOTE — RESULT ENCOUNTER NOTE
Armand Mcdonnell, your tacrolimus level was 8.8 at 12 hours on 2/11/2020 which is within your goal range of 8-10. No dose change at this time. Please call the transplant office ([phone]) with any questions. Thanks, Mikayla

## 2020-02-18 ENCOUNTER — TELEPHONE (OUTPATIENT)
Dept: TRANSPLANT | Facility: CLINIC | Age: 60
End: 2020-02-18

## 2020-02-18 ENCOUNTER — PRE VISIT (OUTPATIENT)
Dept: GASTROENTEROLOGY | Facility: CLINIC | Age: 60
End: 2020-02-18

## 2020-02-18 NOTE — TELEPHONE ENCOUNTER
Pt and Amarilys at dialysis center call regarding upcoming CT (specifically the one scheduled for 2/20 w and w/o contrast). Nephrologist does NOT want patient to receive contrast as there is still chance for renal recovery. Cancelled CT that was w and w/o contrast, pt instructed to wait until after appointment tomorrow for CT to ensure we are doing the right scans (not sure if abd/pelvis is needed?). She agrees. Message sent Dr. Mcelroy and coordinator.

## 2020-02-18 NOTE — PROGRESS NOTES
Pre visit planning completed by Lung TX coordinator office.    Gianna Rogers, [place] CMA  2/18/2020 4:21 PM

## 2020-02-19 ENCOUNTER — RESULTS ONLY (OUTPATIENT)
Dept: OTHER | Facility: CLINIC | Age: 60
End: 2020-02-19

## 2020-02-19 ENCOUNTER — OFFICE VISIT (OUTPATIENT)
Dept: TRANSPLANT | Facility: CLINIC | Age: 60
End: 2020-02-19
Attending: INTERNAL MEDICINE
Payer: COMMERCIAL

## 2020-02-19 ENCOUNTER — PRE VISIT (OUTPATIENT)
Dept: GASTROENTEROLOGY | Facility: CLINIC | Age: 60
End: 2020-02-19

## 2020-02-19 ENCOUNTER — OFFICE VISIT (OUTPATIENT)
Dept: CARDIOLOGY | Facility: CLINIC | Age: 60
End: 2020-02-19
Attending: INTERNAL MEDICINE
Payer: COMMERCIAL

## 2020-02-19 ENCOUNTER — ANCILLARY PROCEDURE (OUTPATIENT)
Dept: GENERAL RADIOLOGY | Facility: CLINIC | Age: 60
End: 2020-02-19
Attending: INTERNAL MEDICINE
Payer: COMMERCIAL

## 2020-02-19 ENCOUNTER — OFFICE VISIT (OUTPATIENT)
Dept: GASTROENTEROLOGY | Facility: CLINIC | Age: 60
End: 2020-02-19
Attending: INTERNAL MEDICINE
Payer: COMMERCIAL

## 2020-02-19 ENCOUNTER — ANCILLARY PROCEDURE (OUTPATIENT)
Dept: CT IMAGING | Facility: CLINIC | Age: 60
End: 2020-02-19
Attending: INTERNAL MEDICINE
Payer: COMMERCIAL

## 2020-02-19 VITALS
SYSTOLIC BLOOD PRESSURE: 102 MMHG | TEMPERATURE: 97.7 F | DIASTOLIC BLOOD PRESSURE: 70 MMHG | BODY MASS INDEX: 20.32 KG/M2 | HEART RATE: 86 BPM | HEIGHT: 64 IN | WEIGHT: 119 LBS | OXYGEN SATURATION: 95 %

## 2020-02-19 VITALS
TEMPERATURE: 97.7 F | OXYGEN SATURATION: 95 % | SYSTOLIC BLOOD PRESSURE: 102 MMHG | HEART RATE: 86 BPM | BODY MASS INDEX: 20.48 KG/M2 | DIASTOLIC BLOOD PRESSURE: 70 MMHG | WEIGHT: 119.4 LBS

## 2020-02-19 VITALS
SYSTOLIC BLOOD PRESSURE: 102 MMHG | HEIGHT: 64 IN | WEIGHT: 119 LBS | BODY MASS INDEX: 20.32 KG/M2 | DIASTOLIC BLOOD PRESSURE: 70 MMHG

## 2020-02-19 DIAGNOSIS — K76.6 PORTAL HYPERTENSION (H): Primary | ICD-10-CM

## 2020-02-19 DIAGNOSIS — Z94.2 LUNG REPLACED BY TRANSPLANT (H): Primary | ICD-10-CM

## 2020-02-19 DIAGNOSIS — J86.9 EMPYEMA OF LUNG (H): ICD-10-CM

## 2020-02-19 DIAGNOSIS — Z94.2 LUNG REPLACED BY TRANSPLANT (H): ICD-10-CM

## 2020-02-19 DIAGNOSIS — K76.6 PORTAL HYPERTENSION (H): ICD-10-CM

## 2020-02-19 DIAGNOSIS — R79.89 ELEVATED LIVER FUNCTION TESTS: ICD-10-CM

## 2020-02-19 DIAGNOSIS — Z94.2 S/P LUNG TRANSPLANT (H): ICD-10-CM

## 2020-02-19 DIAGNOSIS — Z94.2 S/P LUNG TRANSPLANT (H): Primary | ICD-10-CM

## 2020-02-19 DIAGNOSIS — J00 ACUTE NASOPHARYNGITIS: ICD-10-CM

## 2020-02-19 LAB
6 MIN WALK (FT): 1025 FT
6 MIN WALK (M): 312 M
ALBUMIN SERPL-MCNC: 3.3 G/DL (ref 3.4–5)
ALP SERPL-CCNC: 498 U/L (ref 40–150)
ALT SERPL W P-5'-P-CCNC: 30 U/L (ref 0–50)
ANION GAP SERPL CALCULATED.3IONS-SCNC: 4 MMOL/L (ref 3–14)
AST SERPL W P-5'-P-CCNC: 30 U/L (ref 0–45)
BASOPHILS # BLD AUTO: 0 10E9/L (ref 0–0.2)
BASOPHILS NFR BLD AUTO: 0.6 %
BILIRUB SERPL-MCNC: 0.6 MG/DL (ref 0.2–1.3)
BUN SERPL-MCNC: 26 MG/DL (ref 7–30)
C PNEUM DNA SPEC QL NAA+PROBE: NOT DETECTED
CALCIUM SERPL-MCNC: 8.8 MG/DL (ref 8.5–10.1)
CHLORIDE SERPL-SCNC: 104 MMOL/L (ref 94–109)
CHOLEST SERPL-MCNC: 139 MG/DL
CO2 SERPL-SCNC: 32 MMOL/L (ref 20–32)
CREAT SERPL-MCNC: 2.45 MG/DL (ref 0.52–1.04)
DEPRECATED CALCIDIOL+CALCIFEROL SERPL-MC: 44 UG/L (ref 20–75)
DIFFERENTIAL METHOD BLD: ABNORMAL
DLCOCOR-%PRED-PRE: 62 %
DLCOCOR-PRE: 12.88 ML/MIN/MMHG
DLCOUNC-%PRED-PRE: 59 %
DLCOUNC-PRE: 12.2 ML/MIN/MMHG
DLCOUNC-PRED: 20.47 ML/MIN/MMHG
EOSINOPHIL # BLD AUTO: 0.1 10E9/L (ref 0–0.7)
EOSINOPHIL NFR BLD AUTO: 1.3 %
ERV-%PRED-PRE: 75 %
ERV-PRE: 0.81 L
ERV-PRED: 1.08 L
ERYTHROCYTE [DISTWIDTH] IN BLOOD BY AUTOMATED COUNT: 14.5 % (ref 10–15)
EXPTIME-PRE: 6.39 SEC
FEF2575-%PRED-PRE: 21 %
FEF2575-PRE: 0.51 L/SEC
FEF2575-PRED: 2.41 L/SEC
FEFMAX-%PRED-PRE: 42 %
FEFMAX-PRE: 2.77 L/SEC
FEFMAX-PRED: 6.45 L/SEC
FEV1-%PRED-PRE: 38 %
FEV1-PRE: 0.99 L
FEV1FEV6-PRE: 55 %
FEV1FEV6-PRED: 81 %
FEV1FVC-PRE: 56 %
FEV1FVC-PRED: 80 %
FEV1SVC-PRE: 58 %
FEV1SVC-PRED: 77 %
FIFMAX-PRE: 2.72 L/SEC
FLUAV H1 2009 PAND RNA SPEC QL NAA+PROBE: NOT DETECTED
FLUAV H1 RNA SPEC QL NAA+PROBE: NOT DETECTED
FLUAV H3 RNA SPEC QL NAA+PROBE: NOT DETECTED
FLUAV RNA SPEC QL NAA+PROBE: NOT DETECTED
FLUBV RNA SPEC QL NAA+PROBE: NOT DETECTED
FRCPLETH-%PRED-PRE: 84 %
FRCPLETH-PRE: 2.28 L
FRCPLETH-PRED: 2.7 L
FVC-%PRED-PRE: 54 %
FVC-PRE: 1.78 L
FVC-PRED: 3.26 L
GFR SERPL CREATININE-BSD FRML MDRD: 21 ML/MIN/{1.73_M2}
GLUCOSE SERPL-MCNC: 137 MG/DL (ref 70–99)
HADV DNA SPEC QL NAA+PROBE: NOT DETECTED
HCOV PNL SPEC NAA+PROBE: NOT DETECTED
HCT VFR BLD AUTO: 38.2 % (ref 35–47)
HDLC SERPL-MCNC: 69 MG/DL
HGB BLD-MCNC: 11.8 G/DL (ref 11.7–15.7)
HMPV RNA SPEC QL NAA+PROBE: NOT DETECTED
HPIV1 RNA SPEC QL NAA+PROBE: NOT DETECTED
HPIV2 RNA SPEC QL NAA+PROBE: NOT DETECTED
HPIV3 RNA SPEC QL NAA+PROBE: NOT DETECTED
HPIV4 RNA SPEC QL NAA+PROBE: NOT DETECTED
IC-%PRED-PRE: 40 %
IC-PRE: 0.9 L
IC-PRED: 2.25 L
IMM GRANULOCYTES # BLD: 0 10E9/L (ref 0–0.4)
IMM GRANULOCYTES NFR BLD: 0.3 %
INR PPP: 1.03 (ref 0.86–1.14)
LDLC SERPL CALC-MCNC: 55 MG/DL
LYMPHOCYTES # BLD AUTO: 0.9 10E9/L (ref 0.8–5.3)
LYMPHOCYTES NFR BLD AUTO: 13.4 %
M PNEUMO DNA SPEC QL NAA+PROBE: NOT DETECTED
MAGNESIUM SERPL-MCNC: 1.5 MG/DL (ref 1.6–2.3)
MCH RBC QN AUTO: 31.2 PG (ref 26.5–33)
MCHC RBC AUTO-ENTMCNC: 30.9 G/DL (ref 31.5–36.5)
MCV RBC AUTO: 101 FL (ref 78–100)
MICROBIOLOGIST REVIEW: NORMAL
MONOCYTES # BLD AUTO: 0.7 10E9/L (ref 0–1.3)
MONOCYTES NFR BLD AUTO: 10.3 %
NEUTROPHILS # BLD AUTO: 5 10E9/L (ref 1.6–8.3)
NEUTROPHILS NFR BLD AUTO: 74.1 %
NONHDLC SERPL-MCNC: 70 MG/DL
NRBC # BLD AUTO: 0 10*3/UL
NRBC BLD AUTO-RTO: 0 /100
PLATELET # BLD AUTO: 134 10E9/L (ref 150–450)
POTASSIUM SERPL-SCNC: 4.1 MMOL/L (ref 3.4–5.3)
PROT SERPL-MCNC: 7.3 G/DL (ref 6.8–8.8)
RBC # BLD AUTO: 3.78 10E12/L (ref 3.8–5.2)
RSV RNA SPEC QL NAA+PROBE: NOT DETECTED
RSV RNA SPEC QL NAA+PROBE: NOT DETECTED
RV+EV RNA SPEC QL NAA+PROBE: NOT DETECTED
RVPLETH-%PRED-PRE: 78 %
RVPLETH-PRE: 1.46 L
RVPLETH-PRED: 1.85 L
SODIUM SERPL-SCNC: 140 MMOL/L (ref 133–144)
TACROLIMUS BLD-MCNC: 9 UG/L (ref 5–15)
TLCPLETH-%PRED-PRE: 64 %
TLCPLETH-PRE: 3.18 L
TLCPLETH-PRED: 4.94 L
TME LAST DOSE: NORMAL H
TRIGL SERPL-MCNC: 77 MG/DL
VA-%PRED-PRE: 48 %
VA-PRE: 2.36 L
VC-%PRED-PRE: 51 %
VC-PRE: 1.72 L
VC-PRED: 3.33 L
WBC # BLD AUTO: 6.7 10E9/L (ref 4–11)

## 2020-02-19 PROCEDURE — 80061 LIPID PANEL: CPT | Performed by: INTERNAL MEDICINE

## 2020-02-19 PROCEDURE — 83735 ASSAY OF MAGNESIUM: CPT | Performed by: INTERNAL MEDICINE

## 2020-02-19 PROCEDURE — 86833 HLA CLASS II HIGH DEFIN QUAL: CPT | Performed by: INTERNAL MEDICINE

## 2020-02-19 PROCEDURE — 84403 ASSAY OF TOTAL TESTOSTERONE: CPT | Performed by: INTERNAL MEDICINE

## 2020-02-19 PROCEDURE — 87581 M.PNEUMON DNA AMP PROBE: CPT | Performed by: INTERNAL MEDICINE

## 2020-02-19 PROCEDURE — 85025 COMPLETE CBC W/AUTO DIFF WBC: CPT | Performed by: INTERNAL MEDICINE

## 2020-02-19 PROCEDURE — G0463 HOSPITAL OUTPT CLINIC VISIT: HCPCS | Mod: ZF,27

## 2020-02-19 PROCEDURE — 87205 SMEAR GRAM STAIN: CPT | Performed by: INTERNAL MEDICINE

## 2020-02-19 PROCEDURE — 87633 RESP VIRUS 12-25 TARGETS: CPT | Performed by: INTERNAL MEDICINE

## 2020-02-19 PROCEDURE — 87799 DETECT AGENT NOS DNA QUANT: CPT | Performed by: INTERNAL MEDICINE

## 2020-02-19 PROCEDURE — 87486 CHLMYD PNEUM DNA AMP PROBE: CPT | Performed by: INTERNAL MEDICINE

## 2020-02-19 PROCEDURE — 85610 PROTHROMBIN TIME: CPT | Performed by: INTERNAL MEDICINE

## 2020-02-19 PROCEDURE — 36415 COLL VENOUS BLD VENIPUNCTURE: CPT | Performed by: INTERNAL MEDICINE

## 2020-02-19 PROCEDURE — 80053 COMPREHEN METABOLIC PANEL: CPT | Performed by: INTERNAL MEDICINE

## 2020-02-19 PROCEDURE — 82306 VITAMIN D 25 HYDROXY: CPT | Performed by: INTERNAL MEDICINE

## 2020-02-19 PROCEDURE — 86832 HLA CLASS I HIGH DEFIN QUAL: CPT | Performed by: INTERNAL MEDICINE

## 2020-02-19 PROCEDURE — G0463 HOSPITAL OUTPT CLINIC VISIT: HCPCS | Mod: ZF

## 2020-02-19 PROCEDURE — 80197 ASSAY OF TACROLIMUS: CPT | Performed by: INTERNAL MEDICINE

## 2020-02-19 RX ORDER — AMOXICILLIN AND CLAVULANATE POTASSIUM 500; 125 MG/1; MG/1
1 TABLET, FILM COATED ORAL DAILY
Qty: 14 TABLET | Refills: 0 | Status: SHIPPED | OUTPATIENT
Start: 2020-02-19 | End: 2020-02-19

## 2020-02-19 RX ORDER — PREDNISONE 2.5 MG/1
TABLET ORAL
Qty: 90 TABLET | Refills: 11 | Status: ON HOLD | OUTPATIENT
Start: 2020-02-19 | End: 2021-03-04

## 2020-02-19 RX ORDER — AMOXICILLIN AND CLAVULANATE POTASSIUM 500; 125 MG/1; MG/1
1 TABLET, FILM COATED ORAL 2 TIMES DAILY
Qty: 28 TABLET | Refills: 0 | Status: SHIPPED | OUTPATIENT
Start: 2020-02-19 | End: 2020-02-19

## 2020-02-19 RX ORDER — AZITHROMYCIN 250 MG/1
250 TABLET, FILM COATED ORAL DAILY
Qty: 14 TABLET | Refills: 0 | Status: ON HOLD | OUTPATIENT
Start: 2020-02-19 | End: 2020-03-10

## 2020-02-19 RX ORDER — AMOXICILLIN AND CLAVULANATE POTASSIUM 500; 125 MG/1; MG/1
1 TABLET, FILM COATED ORAL DAILY
Qty: 14 TABLET | Refills: 0 | Status: ON HOLD | OUTPATIENT
Start: 2020-02-19 | End: 2020-03-10

## 2020-02-19 RX ORDER — AZITHROMYCIN 250 MG/1
250 TABLET, FILM COATED ORAL DAILY
Qty: 14 TABLET | Refills: 0 | Status: SHIPPED | OUTPATIENT
Start: 2020-02-19 | End: 2020-02-19

## 2020-02-19 ASSESSMENT — ENCOUNTER SYMPTOMS
DYSPNEA ON EXERTION: 0
TASTE DISTURBANCE: 0
COUGH DISTURBING SLEEP: 1
NECK MASS: 0
SMELL DISTURBANCE: 0
SPUTUM PRODUCTION: 1
SHORTNESS OF BREATH: 0
WHEEZING: 1
HEMOPTYSIS: 0
POSTURAL DYSPNEA: 0
COUGH: 1
SNORES LOUDLY: 0

## 2020-02-19 ASSESSMENT — PAIN SCALES - GENERAL
PAINLEVEL: NO PAIN (0)

## 2020-02-19 ASSESSMENT — MIFFLIN-ST. JEOR
SCORE: 1109.78
SCORE: 1109.78

## 2020-02-19 NOTE — NURSING NOTE
Patient came back after Cardiologist and Liver appointments after CT chest. Dr. Mcelroy reviewed CT chest results from today. Might have infection/pneumonia. Sputum culture today. She should NOT get a right heart cath tomorrow. Start antibiotics. Augmentin 500 mg daily, Azithromycin 250 mg once daily    Patient pulled into a room, Dr. Mcelroy reviewed this information with patient.

## 2020-02-19 NOTE — PATIENT INSTRUCTIONS
Medication Changes:  No medication changes at this time. Please continue current medication regiment.      Patient Instructions:  1. Continue staying active and eat a heart healthy diet.    2. Please keep current list of medications with you at all times.    3. Remember to weigh yourself daily after voiding and before you consume any food or beverages and log the numbers.  If you have gained 2 pounds overnight or 5 pounds in a week contact us immediately for medication adjustments or further instructions.    4. **Please call us immediately if you have any syncope (fainting or passing out), chest pain, edema (swelling or weight gain), or decline in your functional status (general decline in how you are feeling).    Check-In  Time Check-In Location Estimated Length Procedure   Name     2/20/20 @ 11am   [facility]  waiting room 60-90 minutes Right Heart Catheterization**     Procedure Preparations & Instructions     This is an invasive procedure that DOES require preparation:    - Nothing to eat for 6 hours   - You may have clear liquids up until the time of your procedure  - A ride should be arranged for you in the instance you are unable to drive home, however you should be able to function as you normally would after the procedure       Follow up Appointment Information:  -We will talk on the phone about the results and discuss plan at that time.        We are located on the third floor of the Clinic and Surgery Center ([facility]) on the [facility].  Our address is     [address] [place] [address] on [address] [id number]    Thank you for allowing us to be a part of your care here at the [facility]    If you have questions or concerns please contact us at:    Krystle Tate, RN, BSN, PHN  Mckenzie Gray (Schedule,Prior Auth)  Nurse Coordinator     Clinic   Pulmonary Hypertension   Pulmonary Hypertension  [facility]  [facility]  (Phone)[phone]   (Phone) [phone]        (Fax) [phone]    ** Please note that you will NOT receive a reminder call regarding your scheduled testing, reminder calls are for provider appointments only.  If you are scheduled for testing within the [facility] system you may receive a call regarding pre-registration for billing purposes only.**     Remember to weigh yourself daily after voiding and before you consume any food or beverages and log the numbers.  If you have gained/lost 2 pounds overnight or 5 pounds in a week contact us immediately for medication adjustments or further instructions.   **Please call us immediately if you have any syncope, chest pain, edema, or decline in your functional status.    Support Group:  Pulmonary Hypertension Association  [email or web address].phassociation.org/  **Look at the Events Tab** They even have Support Groups that you can call into    [facility] PH Support Group  Second Saturday of the Month from 1-3 PM   Location: [address] [place] [address]  Leader: Gaye Becker and Tamiko Lang  Phone: [phone] or [phone]  Email: [email or web address]

## 2020-02-19 NOTE — PROGRESS NOTES
[facility]    Hepatology follow-up    CHIEF COMPLAINT AND REASON FOR VISIT:  New onset ascites.      SUBJECTIVE:  Ms. Blue is a 59-year-old white female with medical history significant for hypertension, chronic kidney disease, interstitial lung disease with antisynthetase syndrome who received a bilateral lung transplant in 03/2018.  She had multiple complications at that time, which included Aspergillus empema.  She is on voriconazole.        She was seen previously with the fellow, Vale Mas MD for that, and today this is her followup.        During that visit, she was ordered workup for portal hypertension, and in fact, she did get a transjugular liver biopsy with pressure measurements.        Symptom-wise, she continues to have the ascites.  Her ascites had a SAAG greater than 1.1, so this was compatible with portal hypertension.  Her liver biopsy did show congestive hepatopathy with moderate  pericellular fibrosis.  There was no bridging fibrosis.  This was resulted by Dr. Fernandez Blanco.        Otherwise, today she has cough but no fever or chills.  She does not have any signs of hepatic encephalopathy nor does she have any signs of gastrointestinal bleeding including melena, hematemesis or hematochezia. Patient denies jaundice.Has abdominal distension and had paracentesis yesterday.    Please note that she had an upper endoscopy done on 10/29/2018, where she did not have esophageal varices.  As far as other symptoms are concerned, she has some abdominal discomfort with the ascites, but does not have any tenderness.  She is having regular bowel movements.     Medical hx Surgical hx   Past Medical History:   Diagnosis Date     Antisynthetase syndrome (H) 2014     Chronic cough      Chronic infection     recent C diff  8/18     Dehydration 8/1/2018     Dermatomyositis (H)      Dysplasia of cervix, low grade (ESTRADA 1)      ILD (interstitial lung disease) (H)       Osteopenia      PONV (postoperative nausea and vomiting)      Pulmonary hypertension (H)      Raynaud's disease      Seronegative rheumatoid arthritis (H)       Past Surgical History:   Procedure Laterality Date     BRONCHOSCOPY (RIGID OR FLEXIBLE), DIAGNOSTIC N/A 4/10/2018    Procedure: COMBINED BRONCHOSCOPY (RIGID OR FLEXIBLE), LAVAGE;;  Surgeon: Mariposa Donohue MD;  Location: [facility]     BRONCHOSCOPY (RIGID OR FLEXIBLE), DILATE BRONCHUS / TRACHEA N/A 10/11/2018    Procedure: BRONCHOSCOPY (RIGID OR FLEXIBLE), DILATE BRONCHUS / TRACHEA;  Flexible And Rigid Bronchoscopy and Dilation;  Surgeon: Wilber Lin MD;  Location: [facility]     BRONCHOSCOPY FLEXIBLE N/A 3/13/2018    Procedure: BRONCHOSCOPY FLEXIBLE;  Flexible Bronchoscopy ;  Surgeon: Gissell Sanchez MD;  Location: [facility]     BRONCHOSCOPY FLEXIBLE N/A 5/9/2018    Procedure: BRONCHOSCOPY FLEXIBLE;;  Surgeon: Wilber Lin MD;  Location: [facility]     BRONCHOSCOPY FLEXIBLE AND RIGID N/A 9/10/2018    Procedure: BRONCHOSCOPY FLEXIBLE AND RIGID;  Flexible and Rigid Bronchoscopy with Balloon Dilation, tissue debulking with cryo, and Right mainstem bronchus stent placement;  Surgeon: Wilber Lin MD;  Location: [facility]     BRONCHOSCOPY RIGID N/A 6/6/2018    Procedure: BRONCHOSCOPY RIGID;;  Surgeon: Lopez Macias MD;  Location: [facility]     BRONCHOSCOPY, DILATE BRONCHUS, STENT BRONCHUS, COMBINED N/A 6/11/2018    Procedure: COMBINED BRONCHOSCOPY, DILATE BRONCHUS, STENT BRONCHUS;  Flexible Bronchoscopy, Balloon Dilation, Bronchial Washings;  Surgeon: Wilber Lin MD;  [facility]     BRONCHOSCOPY, DILATE BRONCHUS, STENT BRONCHUS, COMBINED Right 7/10/2018    Procedure: COMBINED BRONCHOSCOPY, DILATE BRONCHUS, STENT BRONCHUS;  Flexible Bronchoscopy, Balloon Dilation, Bronchial Washings  ;  Surgeon: Wilber Lin MD;  [facility]     BRONCHOSCOPY, DILATE BRONCHUS, STENT BRONCHUS, COMBINED N/A 8/2/2018    Procedure:  COMBINED BRONCHOSCOPY, DILATE BRONCHUS, STENT BRONCHUS;  Flexible Bronchoscopy, Bronchial Washings, Balloon Dilation;  Surgeon: Wilber Lin MD;  [facility]     BRONCHOSCOPY, DILATE BRONCHUS, STENT BRONCHUS, COMBINED N/A 8/20/2018    Procedure: COMBINED BRONCHOSCOPY, DILATE BRONCHUS, STENT BRONCHUS;  Flexible Bronchoscopy, Balloon Dilation;  Surgeon: Wilber Lin MD;  [facility]     BRONCHOSCOPY, DILATE BRONCHUS, STENT BRONCHUS, COMBINED N/A 10/29/2018    Procedure: Flexible Bronchoscopy, Balloon Dilation, Stent Revision, Airway Examination And Therapeutic Suctioning, Cyro Tumor Debulking;  Surgeon: Wilber Lin MD;  [facility]     BRONCHOSCOPY, DILATE BRONCHUS, STENT BRONCHUS, COMBINED N/A 11/20/2018    Procedure: Rigid Bronchoscopy, Stent Removal and dilitation;  Surgeon: Wilber Lin MD;  [facility]     BRONCHOSCOPY, DILATE BRONCHUS, STENT BRONCHUS, COMBINED N/A 12/14/2018    Procedure: Flexible And Rigid Bronchoscopy, Balloon Dilation, Bronchial Washing;  Surgeon: Wilber Lin MD;  [facility]     BRONCHOSCOPY, DILATE BRONCHUS, STENT BRONCHUS, COMBINED N/A 1/17/2019    Procedure: Flexible And Rigid Bronchoscopy, Balloon Dilation.;  Surgeon: Wilber Lin MD;  [facility]     BRONCHOSCOPY, DILATE BRONCHUS, STENT BRONCHUS, COMBINED N/A 3/7/2019    Procedure: Flexible and Rigid Bronchoscopy, Bronchial Washing, Balloon Dilation;  Surgeon: Wilber Lin MD;  [facility]     BRONCHOSCOPY, DILATE BRONCHUS, STENT BRONCHUS, COMBINED N/A 6/6/2019    Procedure: Rigid and Flexible Bronchoscopy, Balloon Dilation;  Surgeon: Wilber Lin MD;  [facility]     BRONCHOSCOPY, DILATE BRONCHUS, STENT BRONCHUS, COMBINED N/A 10/11/2019    Procedure: Flexible and Rigid Bronchoscopy, Balloon Dilation, Bronchoalveolar Lagave;  Surgeon: Wilber Lin MD;  Location: [facility]     ENT SURGERY      tonsillectomy as a child      ESOPHAGOSCOPY, GASTROSCOPY, DUODENOSCOPY (EGD), COMBINED N/A 10/29/2018    Procedure: COMBINED ESOPHAGOSCOPY, GASTROSCOPY, DUODENOSCOPY (EGD) with biopsies and polypectomy;  Surgeon: Chente Bloom MD;  [facility]     INSERT EXTRACORPORAL MEMBRANE OXYGENATOR ADULT (OUTSIDE OR) N/A 2/27/2018    Procedure: INSERT EXTRACORPORAL MEMBRANE OXYGENATOR ADULT (OUTSIDE OR);  INSERT EXTRACORPORAL MEMBRANE OXYGENATOR ADULT (OUTSIDE OR) ;  Surgeon: Toby Hernandez MD;  Location: [facility]     IR CVC TUNNEL PLACEMENT > 5 YRS OF AGE  10/25/2019     IR PARACENTESIS  1/8/2020     IR THORACENTESIS  9/13/2019     IR TRANSCATHETER BIOPSY  1/8/2020     no prior surgery       REMOVE EXTRACORPORAL MEMBRANE OXYGENATOR ADULT N/A 3/3/2018    Procedure: REMOVE EXTRACORPORAL MEMBRANE OXYGENATOR ADULT;  Removal of Right Femoral Venous and Right Axillary Arterial Extracorporeal Membrane Oxygenator;  Surgeon: Toby Hernandez MD;  [facility]     TRANSPLANT LUNG RECIPIENT SINGLE X2 Bilateral 3/1/2018    Procedure: TRANSPLANT LUNG RECIPIENT SINGLE X2;  Median Sternotomy, Extracorporeal Membrane Oxygenator, bilateral sequential lung transplant;  Surgeon: Toby Hernandez MD;  Location: [facility]          Medications  Prior to Admission medications    Medication Sig Start Date End Date Taking? Authorizing Provider   calcium-vitamin D (CALTRATE) 600-400 MG-UNIT per tablet Take 1 tablet by mouth daily 8/1/18  Yes Ame Chow PA-C   carvedilol (COREG) 25 MG tablet Take 1 tablet (25 mg) by mouth 2 times daily (with meals) 12/18/19  Yes Srinivas Mcelroy MD   dapsone (ACZONE) 100 MG tablet Take 1 tablet (100 mg) by mouth daily 12/3/19  Yes Ame Chow PA-C   ferrous sulfate (FEROSUL) 325 (65 Fe) MG tablet Take 1 tablet (325 mg) by mouth daily (with breakfast) 9/15/19  Yes Christelle Lopez MD   magnesium oxide (MAG-OX) 400 MG tablet Take 400 mg by mouth daily    Yes Unknown,  "Entered By History   multivitamin, therapeutic with minerals (THERA-VIT-M) TABS tablet Take 1 tablet by mouth daily 5/24/18  Yes Alex Hale MD   ondansetron (ZOFRAN) 4 MG tablet Take 1 tablet (4 mg) by mouth every 12 hours as needed for nausea 12/3/19  Yes Ame Chow PA-C   pantoprazole (PROTONIX) 40 MG EC tablet Take 1 tablet (40 mg) by mouth 2 times daily 6/24/19  Yes Ame Chow PA-C   predniSONE 2.5 MG PO tablet Take two tablets (5mg) every AM and one tablet (2.5mg) every evening 2/19/20  Yes Srinivas Mcelroy MD   senna-docusate (SENOKOT-S/PERICOLACE) 8.6-50 MG tablet Take 2 tablets by mouth 2 times daily as needed for constipation 9/15/19  Yes Christelle Lopez MD   tacrolimus (GENERIC EQUIVALENT) 0.5 MG capsule Take 1 capsule (0.5 mg) by mouth every evening Total dose: 1 mg in the AM and 0.5 mg in the PM 12/30/19  Yes Ame Chow PA-C   tacrolimus (GENERIC EQUIVALENT) 1 MG capsule Take 1 capsule (1 mg) by mouth every morning Total dose: 1 mg in the AM and 0.5 mg in the PM 12/30/19  Yes Ame Chow PA-C   voriconazole (VFEND) 200 MG tablet Take 1 tablet (200 mg) by mouth 2 times daily TOTAL DOSE: 250 mg every 12 hours 11/18/19  Yes Dean Riley MD   voriconazole (VFEND) 50 MG tablet Take 1 tablet (50 mg) by mouth 2 times daily TOTAL DOSE: 250 mg every 12 hours 11/18/19  Yes Dean Riley MD   amoxicillin-clavulanate 500-125 MG PO tablet Take 1 tablet by mouth daily 2/19/20   rSinivas Mcelroy MD   azithromycin 250 MG PO tablet Take 1 tablet (250 mg) by mouth daily 2/19/20   Srinivas Mcelroy MD       Allergies  No Known Allergies    Review of systems  A 10-point review of systems was negative    Examination  /70   Pulse 86   Temp 97.7  F (36.5  C) (Oral)   Ht 1.626 m (5' 4\")   Wt 54 kg (119 lb)   LMP 06/07/2014 (Exact Date)   SpO2 95%   BMI 20.43 kg/m    Body mass index is 20.43 kg/m .    Gen- well, NAD, A+Ox3, normal " color  Lym- no palpable LAD  CVS- RRR  RS- CTA  Abd- Distended. Positive shifting dullness.  Extr- hands normal, no DENIS  Skin- no rash or jaundice  Neuro- no asterixis  Psych- normal mood    Laboratory  Lab Results   Component Value Date     02/19/2020    POTASSIUM 4.1 02/19/2020    CHLORIDE 104 02/19/2020    CO2 32 02/19/2020    BUN 26 02/19/2020    CR 2.45 02/19/2020       Lab Results   Component Value Date    BILITOTAL 0.6 02/19/2020    ALT 30 02/19/2020    AST 30 02/19/2020    ALKPHOS 498 02/19/2020       Lab Results   Component Value Date    ALBUMIN 3.3 02/19/2020    PROTTOTAL 7.3 02/19/2020        Lab Results   Component Value Date    WBC 6.7 02/19/2020    HGB 11.8 02/19/2020     02/19/2020     02/19/2020       Lab Results   Component Value Date    INR 1.03 02/19/2020     MELD-Na score: 15 at 2/19/2020  7:13 AM  MELD score: 15 at 2/19/2020  7:13 AM  Calculated from:  Serum Creatinine: 2.45 mg/dL at 2/19/2020  7:13 AM  Serum Sodium: 140 mmol/L (Rounded to 137 mmol/L) at 2/19/2020  7:13 AM  Total Bilirubin: 0.6 mg/dL (Rounded to 1 mg/dL) at 2/19/2020  7:13 AM  INR(ratio): 1.03 at 2/19/2020  7:13 AM  Age: 59 years    Radiology    ASSESSMENT AND PLAN:  Portal hypertension with new onset ascites.  Her portal hypertension was investigated.  In fact, she had portal pressure measurements done by Dr. Fields from IR and he measured that her wedge pressure was 21 mmHg and her free hepatic vein pressures were 7.  This was significant and the transjugular liver biopsy showed congestive hepatopathy, which goes for her previous history of pulmonary hypertension.  She will see Dr. Schaffer regarding that and we await his input.  She was also seen by her lung transplant doctor, Dr. Srinivas Mcelroy, on the same day.  She will also have further workup by Pulmonary.  Regardless, optimizing her heart might improve her ascites.  Otherwise, she will have as-needed paracentesis and we will see her here in 6  months.        PLAN:  We await Dr. Schaffer's input, including a right heart catheterization that was to be scheduled.      This was a 40-minute visit of which more than 50% was spent in explaining to the patient our plan of care.  We answered all of her questions and she will be seen here in 6 months.       Feng Denise MD  Hepatology  [facility]

## 2020-02-19 NOTE — RESULT ENCOUNTER NOTE
Armand Mcdonnell, your tacrolimus level was 9 at 12 hours on 2/19/2020 which is within your goal range of 8-10. No dose change at this time. Please call the transplant office ([phone]) with any questions. Thanks, Mikayla

## 2020-02-19 NOTE — NURSING NOTE
Chief Complaint   Patient presents with     RECHECK     S/P Lung Tx       /70 (BP Location: Right arm, Patient Position: Sitting, Cuff Size: Adult Regular)   Pulse 86   Temp 97.7  F (36.5  C) (Oral)   Wt 54.2 kg (119 lb 6.4 oz)   LMP 06/07/2014 (Exact Date)   SpO2 95%   BMI 20.48 kg/m        Cher Ridley CMA    2/19/2020 9:06 AM

## 2020-02-19 NOTE — PROGRESS NOTES
[facility]  Pulmonary Medicine/Lung Transplant  February 19, 2020       Today's visit note:       ASSESSMENT/PLAN:    1.  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  She is currently on 2 drug immune suppression (tacrolimus and prednisone) because of recurrent leukopenia and infections.    2.  ID prophylaxis includes dapsone for PJP which was started during her 10/2019 hospitalization (G6PD level is normal).     3.  Productive cough with accompanying upper respiratory symptoms.  In view of her CT findings today that include increased right lower lobe and left lower lobe tree-in-bud and ground-glass opacities, I am concluding that there is a lower respiratory tract infection which is most likely bacterial.  Nasal swab for respiratory viral panel was obtained today.  The patient was given a cup and will bring a sputum sample in tomorrow for bacterial and fungal cultures.  Regarding fungal infection, she is already being treated with voriconazole for previous Aspergillus empyema.  Today's CT scan does not show an increase in the left pleural effusion, which seems to make it less likely that the parenchymal problem is fungal.  I began antibacterial treatment with Augmentin (renally adjusted) and azithromycin for a planned 2-week course at which time she will be reassessed with CT scan, PFTs and exam.     4.  Pulmonary hypertension.  She was seen by Dr. Schaffer in Pulmonary Hypertension Clinic this morning in anticipation of a planned right heart catheterization tomorrow.  However, we have canceled that procedure in view of her active pulmonary process and have tentatively rescheduled it for 2 weeks from tomorrow.     5.  Ascites and portal hypertension, thought to be related to chronic right-sided heart pressures.  Please refer to Hepatology and Cardiology notes from today for details.     6.   "History of left-sided Aspergillus empyema, which was first noted on 10/08/2019.  She will continue voriconazole for an undefined period of time.  Again, CT scan today did not appear to show an increase in left pleural effusion which is encouraging.     7.  History of CMV viremia.  Her most recent CMV PCR was on 02/11/2020 and was below the detectable level in their lab of 35 copies per mL.  Today's PCR is pending.     8.  History of EBV viremia, with today's result in process.     9.  Dialysis-dependent renal failure.  The patient reports that her dialysis team is encouraged by the fact that her urine output appears to be increasing.     10.  History of hypertension, being treated with amlodipine, carvedilol and Imdur.     11.  Health care maintenance.  Vaccinations were not administered today.      The patient will return to see Dr. Christensen in 2 weeks with a repeat CT scan at that time.     Please also refer to RN Transplant Coordinator note for additional information related to this visit.  PATIENT PROFILE AND TRANSPLANT HISTORY:  Current age:                    59 year old  Underlying lung disease: IIP: Nonspecific Interstitial Pneumonia    Transplant date(s):        3/1/2018 (Lung)  Transplant POD(s):        719  Lung transplant type(s):       Transplant coordinator:   Mikayla Latham  Transplant provider(s):        Ame Chow    Active Patient Thresholds     Lab Low High Effective Since Comment    Tacrolimus Level 8 10 06/06/2019 6/6/19 CP          INTERVAL HISTORY:  --Most recent resulted PRA: 12/18/19, neg for DSA    --Most recent lung transplant clinic visit: 12/18/19 ([facility])    --Interval clinic visits, test results, signif medical events (obtained by chart review and patient hx):    12/18/19: ID clinic-->continue vori and repeat CT    1/8/19: transjugular liver biopsy and paracentesis  Pressures:  Hepatic Wedge - 21  Hepatic Free - 7  RA - 4  From path report: \"Congestive hepatopathy with moderate " "zone 3 pericellular fibrosis\"    --Today:    The patient was seen in clinic today for a followup visit.  She was accompanied by her , Ranjan.  The patient reports that her breathing and exercise tolerance have remained about the same since her visit with me in 2019.  However, she had an increase from her baseline cough about 2-3 weeks ago, which was accompanied by upper respiratory symptoms.  She is producing mucoid sputum which is not frankly purulent.  She is not having hemoptysis.  She has not been having fever, chills or other systemic symptoms.      REVIEW OF SYSTEMS:   1.  She continues to require intermittent paracenteses for ascites; the most recent one was done yesterday and was the first one she had had in about a month.   2.  She reports that dialysis is \"going fine.\"  She is producing urine which seems to be gradually increasing in quantity.   3.  Complete review of systems was asked and was otherwise negative.     PHYSICAL EXAM:    Vital signs:  Temp: 97.7  F (36.5  C) Temp src: Oral BP: 102/70 Pulse: 86     SpO2: 95 %(on room air)       Weight: 54.2 kg (119 lb 6.4 oz)  Estimated body mass index is 20.48 kg/m  as calculated from the following:    Height as of 1/8/20: 1.626 m (5' 4.02\").    Weight as of this encounter: 54.2 kg (119 lb 6.4 oz).      Constitutional:    Awake, alert and in no apparent distress breathing room air at rest.   Eyes:    Anicteric, PERRL   ENT:    oral mucosa moist without lesions    Neck:    Supple without supraclavicular or cervical lymphadenopathy    Lungs:    Good air entry both lungs. No crackles. + rub vs rhoncus over right posterior chest.  No rhonchi. + wheezes on forced expiration.   Cardiovascular:    Normal S1 and S2. No JVD, murmur, rub, chavez. RRR   Abdomen:    NABS, soft, nontender, nondistended. No HSM.    Musculoskeletal:    No edema.    Neurologic:    Alert and conversant.    Skin:    Warm, dry. No rash seen. Sclerodactyly.          Data:     I reviewed " all resulted lab tests and imaging studies.    No results found for any visits on 02/19/20.    PULMONARY FUNCTION TEST INTERPRETATION:    Pulmonary function tests dated 2/19/2020 were reviewed and interpreted by me.  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  When compared with this patient's most recent previous spirometry, dated 12/18/2019, there has been an approximately 10% decrease in FEV1.    SIX-MINUTE WALK TEST INTERPRETATION:    6-minute walk test results were reviewed and interpreted by me.  Patient walked a total of 1025 feet while breathing room air (lower limit of normal 1469 feet).  There was no O2 desaturation during the test.  The test results are considered abnormal in view of the lower than predicted total walk distance    STUDIES STILL PENDING AT THE TIME OF THIS NOTE:  Unresulted Labs Ordered in the Past 30 Days of this Admission     No orders found from 1/20/2020 to 2/20/2020.          Complexity indicators:    --immune compromised, on high-risk medications x   --organ transplant recipient x   --multiple organ transplant recipient    --active respiratory infection    --within one year of transplant; and/or within one month of hospitalization    --chronic lung allograft dysfunction syndrome (CLAD, chronic rejection, or bronchiolitis obliterans syndrome)    --new medical problem addressed during this visit    --multiple active medical problems    --admitted directly to hospital from this clinic visit    -->50% of this visit was spent in counseling and care coordination. If yes, total visit time was              Medications:     Current Outpatient Medications   Medication     calcium-vitamin D (CALTRATE) 600-400 MG-UNIT per tablet     carvedilol (COREG) 25 MG tablet     dapsone (ACZONE) 100 MG tablet     ferrous sulfate (FEROSUL) 325 (65 Fe) MG tablet     magnesium oxide (MAG-OX) 400 MG tablet      multivitamin, therapeutic with minerals (THERA-VIT-M) TABS tablet     ondansetron (ZOFRAN) 4 MG tablet     pantoprazole (PROTONIX) 40 MG EC tablet     predniSONE (DELTASONE) 2.5 MG tablet     senna-docusate (SENOKOT-S/PERICOLACE) 8.6-50 MG tablet     tacrolimus (GENERIC EQUIVALENT) 0.5 MG capsule     tacrolimus (GENERIC EQUIVALENT) 1 MG capsule     voriconazole (VFEND) 200 MG tablet     voriconazole (VFEND) 50 MG tablet     No current facility-administered medications for this visit.             Past Medical and Surgical History:     Past Medical History:   Diagnosis Date     Antisynthetase syndrome (H) 2014     Chronic cough      Chronic infection     recent C diff  8/18     Dehydration 8/1/2018     Dermatomyositis (H)      Dysplasia of cervix, low grade (ESTRADA 1)      ILD (interstitial lung disease) (H)      Osteopenia      PONV (postoperative nausea and vomiting)      Pulmonary hypertension (H)      Raynaud's disease      Seronegative rheumatoid arthritis (H)      Past Surgical History:   Procedure Laterality Date     BRONCHOSCOPY (RIGID OR FLEXIBLE), DIAGNOSTIC N/A 4/10/2018    Procedure: COMBINED BRONCHOSCOPY (RIGID OR FLEXIBLE), LAVAGE;;  Surgeon: Mariposa Donohue MD;  Location:  GI     BRONCHOSCOPY (RIGID OR FLEXIBLE), DILATE BRONCHUS / TRACHEA N/A 10/11/2018    Procedure: BRONCHOSCOPY (RIGID OR FLEXIBLE), DILATE BRONCHUS / TRACHEA;  Flexible And Rigid Bronchoscopy and Dilation;  Surgeon: Wilber Lin MD;  Location: [facility]     BRONCHOSCOPY FLEXIBLE N/A 3/13/2018    Procedure: BRONCHOSCOPY FLEXIBLE;  Flexible Bronchoscopy ;  Surgeon: Gissell Sanchez MD;  Location: [facility] GI     BRONCHOSCOPY FLEXIBLE N/A 5/9/2018    Procedure: BRONCHOSCOPY FLEXIBLE;;  Surgeon: Wilber Lin MD;  Location:  GI     BRONCHOSCOPY FLEXIBLE AND RIGID N/A 9/10/2018    Procedure: BRONCHOSCOPY FLEXIBLE AND RIGID;  Flexible and Rigid Bronchoscopy with Balloon Dilation, tissue debulking with cryo, and Right  mainstem bronchus stent placement;  Surgeon: Wilber Lin MD;  Location: [facility]     BRONCHOSCOPY RIGID N/A 6/6/2018    Procedure: BRONCHOSCOPY RIGID;;  Surgeon: Lopez Macias MD;  Location: [facility]     BRONCHOSCOPY, DILATE BRONCHUS, STENT BRONCHUS, COMBINED N/A 6/11/2018    Procedure: COMBINED BRONCHOSCOPY, DILATE BRONCHUS, STENT BRONCHUS;  Flexible Bronchoscopy, Balloon Dilation, Bronchial Washings;  Surgeon: Wilber Lin MD;  Location: [facility]     BRONCHOSCOPY, DILATE BRONCHUS, STENT BRONCHUS, COMBINED Right 7/10/2018    Procedure: COMBINED BRONCHOSCOPY, DILATE BRONCHUS, STENT BRONCHUS;  Flexible Bronchoscopy, Balloon Dilation, Bronchial Washings  ;  Surgeon: Wilber Lin MD;  Location: [facility]     BRONCHOSCOPY, DILATE BRONCHUS, STENT BRONCHUS, COMBINED N/A 8/2/2018    Procedure: COMBINED BRONCHOSCOPY, DILATE BRONCHUS, STENT BRONCHUS;  Flexible Bronchoscopy, Bronchial Washings, Balloon Dilation;  Surgeon: Wilber Lin MD;  Location: [facility]     BRONCHOSCOPY, DILATE BRONCHUS, STENT BRONCHUS, COMBINED N/A 8/20/2018    Procedure: COMBINED BRONCHOSCOPY, DILATE BRONCHUS, STENT BRONCHUS;  Flexible Bronchoscopy, Balloon Dilation;  Surgeon: Wilber Lin MD;  Location: [facility]     BRONCHOSCOPY, DILATE BRONCHUS, STENT BRONCHUS, COMBINED N/A 10/29/2018    Procedure: Flexible Bronchoscopy, Balloon Dilation, Stent Revision, Airway Examination And Therapeutic Suctioning, Cyro Tumor Debulking;  Surgeon: Wilber Lin MD;  Location: [facility]     BRONCHOSCOPY, DILATE BRONCHUS, STENT BRONCHUS, COMBINED N/A 11/20/2018    Procedure: Rigid Bronchoscopy, Stent Removal and dilitation;  Surgeon: Wilber Lin MD;  Location: [facility]     BRONCHOSCOPY, DILATE BRONCHUS, STENT BRONCHUS, COMBINED N/A 12/14/2018    Procedure: Flexible And Rigid Bronchoscopy, Balloon Dilation, Bronchial Washing;  Surgeon: Wilber Lin MD;  Location: [facility]     BRONCHOSCOPY, DILATE BRONCHUS,  STENT BRONCHUS, COMBINED N/A 1/17/2019    Procedure: Flexible And Rigid Bronchoscopy, Balloon Dilation.;  Surgeon: Wilber Lin MD;  Location: [facility]     BRONCHOSCOPY, DILATE BRONCHUS, STENT BRONCHUS, COMBINED N/A 3/7/2019    Procedure: Flexible and Rigid Bronchoscopy, Bronchial Washing, Balloon Dilation;  Surgeon: Wilber Lin MD;  Location: [facility]     BRONCHOSCOPY, DILATE BRONCHUS, STENT BRONCHUS, COMBINED N/A 6/6/2019    Procedure: Rigid and Flexible Bronchoscopy, Balloon Dilation;  Surgeon: Wilber Lin MD;  Location: [facility]     BRONCHOSCOPY, DILATE BRONCHUS, STENT BRONCHUS, COMBINED N/A 10/11/2019    Procedure: Flexible and Rigid Bronchoscopy, Balloon Dilation, Bronchoalveolar Lagave;  Surgeon: Wilber Lin MD;  Location: [facility]     ENT SURGERY      tonsillectomy as a child     ESOPHAGOSCOPY, GASTROSCOPY, DUODENOSCOPY (EGD), COMBINED N/A 10/29/2018    Procedure: COMBINED ESOPHAGOSCOPY, GASTROSCOPY, DUODENOSCOPY (EGD) with biopsies and polypectomy;  Surgeon: Chente Bloom MD;  Location: [facility]     INSERT EXTRACORPORAL MEMBRANE OXYGENATOR ADULT (OUTSIDE OR) N/A 2/27/2018    Procedure: INSERT EXTRACORPORAL MEMBRANE OXYGENATOR ADULT (OUTSIDE OR);  INSERT EXTRACORPORAL MEMBRANE OXYGENATOR ADULT (OUTSIDE OR) ;  Surgeon: Toby Hernandez MD;  Location: [facility]     IR CVC TUNNEL PLACEMENT > 5 YRS OF AGE  10/25/2019     IR PARACENTESIS  1/8/2020     IR THORACENTESIS  9/13/2019     IR TRANSCATHETER BIOPSY  1/8/2020     no prior surgery       REMOVE EXTRACORPORAL MEMBRANE OXYGENATOR ADULT N/A 3/3/2018    Procedure: REMOVE EXTRACORPORAL MEMBRANE OXYGENATOR ADULT;  Removal of Right Femoral Venous and Right Axillary Arterial Extracorporeal Membrane Oxygenator;  Surgeon: Toby Hernandez MD;  Location: [facility]     TRANSPLANT LUNG RECIPIENT SINGLE X2 Bilateral 3/1/2018    Procedure: TRANSPLANT LUNG RECIPIENT SINGLE X2;  Median Sternotomy, Extracorporeal Membrane  Oxygenator, bilateral sequential lung transplant;  Surgeon: Toby Hernandez MD;  Location:  OR           Family History:     Family History   Problem Relation Age of Onset     Hypertension Mother      Arthritis Mother      Pancreatic Cancer Father      Diabetes Father

## 2020-02-19 NOTE — PATIENT INSTRUCTIONS
Patient Instructions  1. Try to exercise most days of the week  2. Get a Chest CT without contrast on your way out today scheduled for 12 PM  3. Please call us with any changes/concerns at [phone]  4. We will let you know regarding the labs that are still pending for today.   5. Heart Cath tomorrow 2/20/20  6. Get a sputum culture today    Next transplant clinic appointment:  3/9/20 at 220PM with Dr. Christensen with CXR, labs and PFTs, [facility] on 3/10  Next lab draw: weekly with Dialysis runs    ~~~~~~~~~~~~~~~~~~~~~~~~~    Thoracic Transplant Office phone [phone], fax [phone]    Office Hours 8:30 - 5:00     For after-hours urgent issues, please dial [phone], and ask to speak with the Thoracic Transplant Coordinator  On-Call, pager [phone].  --------------------  To expedite your medication refill(s), please contact your pharmacy and have them fax a refill request to: [phone]  .   *Please allow 3 business days for routine medication refills.  *Please allow 5 business days for controlled substance medication refills.    **For Diabetic medications and supplies refill(s), please contact your pharmacy and have them  Contact your Endocrine team.  --------------------  For scheduling appointments call [phone].  --------------------  Please Note: If you are active on [employer], all future test results will be sent by [employer] message only, and will no longer be called to patient. You may also receive communication directly from your physician.

## 2020-02-19 NOTE — NURSING NOTE
"Transplant Coordinator Note    Reason for visit: Post lung transplant follow up visit   Coordinator: Present   Caregiver:  Ranjan JIM)    Health concerns addressed today:  1. Voriconazole for Aspergillus  2. Liver Biopsy: seeing the liver Doctor today and heart doctor today.   3. Dialysis is going fine but I want to get off this. Making some urine and its normal (no burning or pain). It's increasing overtime. NO CONTRAST.   4. Cough like crazy: some phelgm (mainly in the morning: \"glog in my throat\") coming up at least a week. Gurgles in sleep/wheezing and no one sleeping. OTC cough medicine tried and didn't do anything. Have this constant cough. Old cough at baseline and new cough  5. Paracentesis yesterday 2/18/20. Was having about once per week. Legs swelling sometimes at night. Not too bad.   6. Labs look good, chest xray looks the way it did before. PFTs are in between not the best and not the worst.   7. Creatinine is better today at 2.45    Activity/rehab:   Oxygen needs: none  Pain management/RX: none  Diabetic management: none      Pt Education: medications (use/dose/side effects), how/when to call coordinator, frequency of labs, s/s of infection/rejection, call prior to starting any new medications, lab/vital sign book    Health Maintenance:     Last colonoscopy:     Next colonoscopy due:     Dermatology:    Vaccinations this visit:     Labs, CXR, PFTs reviewed with patient  Medication record reviewed and reconciled  Questions and concerns addressed    Patient Instructions  1. Try to exercise most days of the week  2. Get a Chest CT without contrast on your way out today scheduled for 12 PM  3. Please call us with any changes/concerns at [phone]  4. We will let you know regarding the labs that are still pending for today.   5. Heart Cath tomorrow 2/20/20  6. Get a sputum culture today    Next transplant clinic appointment:  2 months with CXR, labs and PFTs  Next lab draw: weekly with Dialysis " runs      AVS printed at time of check out

## 2020-02-19 NOTE — LETTER
2/19/2020       RE: Kecia Blue  [address] [place] [address]     Dear Colleague,    Thank you for referring your patient, Kecia Blue, to the [facility] HEPATOLOGY at [facility]. Please see a copy of my visit note below.    [facility]    Hepatology follow-up    CHIEF COMPLAINT AND REASON FOR VISIT:  New onset ascites.      SUBJECTIVE:  Ms. Blue is a 59-year-old white female with medical history significant for hypertension, chronic kidney disease, interstitial lung disease with antisynthetase syndrome who received a bilateral lung transplant in 03/2018.  She had multiple complications at that time, which included Aspergillus empema.  She is on voriconazole.        She was seen previously with the fellow, Vale Mas MD for that, and today this is her followup.        During that visit, she was ordered workup for portal hypertension, and in fact, she did get a transjugular liver biopsy with pressure measurements.        Symptom-wise, she continues to have the ascites.  Her ascites had a SAAG greater than 1.1, so this was compatible with portal hypertension.  Her liver biopsy did show congestive hepatopathy with moderate  pericellular fibrosis.  There was no bridging fibrosis.  This was resulted by Dr. Fernandez Blanco.        Otherwise, today she has cough but no fever or chills.  She does not have any signs of hepatic encephalopathy nor does she have any signs of gastrointestinal bleeding including melena, hematemesis or hematochezia. Patient denies jaundice.Has abdominal distension and had paracentesis yesterday.    Please note that she had an upper endoscopy done on 10/29/2018, where she did not have esophageal varices.  As far as other symptoms are concerned, she has some abdominal discomfort with the ascites, but does not have any tenderness.  She is having regular bowel movements.     Medical hx  Surgical hx   Past Medical History:   Diagnosis Date     Antisynthetase syndrome (H) 2014     Chronic cough      Chronic infection     recent C diff  8/18     Dehydration 8/1/2018     Dermatomyositis (H)      Dysplasia of cervix, low grade (ESTRADA 1)      ILD (interstitial lung disease) (H)      Osteopenia      PONV (postoperative nausea and vomiting)      Pulmonary hypertension (H)      Raynaud's disease      Seronegative rheumatoid arthritis (H)       Past Surgical History:   Procedure Laterality Date     BRONCHOSCOPY (RIGID OR FLEXIBLE), DIAGNOSTIC N/A 4/10/2018    Procedure: COMBINED BRONCHOSCOPY (RIGID OR FLEXIBLE), LAVAGE;;  Surgeon: Mariposa Donohue MD;  Location: [facility]     BRONCHOSCOPY (RIGID OR FLEXIBLE), DILATE BRONCHUS / TRACHEA N/A 10/11/2018    Procedure: BRONCHOSCOPY (RIGID OR FLEXIBLE), DILATE BRONCHUS / TRACHEA;  Flexible And Rigid Bronchoscopy and Dilation;  Surgeon: Wilber Lin MD;  Location: [facility]     BRONCHOSCOPY FLEXIBLE N/A 3/13/2018    Procedure: BRONCHOSCOPY FLEXIBLE;  Flexible Bronchoscopy ;  Surgeon: Gissell Sanchez MD;  Location: [facility]     BRONCHOSCOPY FLEXIBLE N/A 5/9/2018    Procedure: BRONCHOSCOPY FLEXIBLE;;  Surgeon: Wilber Lin MD;  Location: [facility]     BRONCHOSCOPY FLEXIBLE AND RIGID N/A 9/10/2018    Procedure: BRONCHOSCOPY FLEXIBLE AND RIGID;  Flexible and Rigid Bronchoscopy with Balloon Dilation, tissue debulking with cryo, and Right mainstem bronchus stent placement;  Surgeon: Wilber Lin MD;  Location[facility]     BRONCHOSCOPY RIGID N/A 6/6/2018    Procedure: BRONCHOSCOPY RIGID;;  Surgeon: Lopez Macias MD;  Location: [facility]     BRONCHOSCOPY, DILATE BRONCHUS, STENT BRONCHUS, COMBINED N/A 6/11/2018    Procedure: COMBINED BRONCHOSCOPY, DILATE BRONCHUS, STENT BRONCHUS;  Flexible Bronchoscopy, Balloon Dilation, Bronchial Washings;  Surgeon: Wilber Lin MD;  [facility]     BRONCHOSCOPY, DILATE BRONCHUS, STENT BRONCHUS,  COMBINED Right 7/10/2018    Procedure: COMBINED BRONCHOSCOPY, DILATE BRONCHUS, STENT BRONCHUS;  Flexible Bronchoscopy, Balloon Dilation, Bronchial Washings  ;  Surgeon: Wilber Lin MD;  [facility]     BRONCHOSCOPY, DILATE BRONCHUS, STENT BRONCHUS, COMBINED N/A 8/2/2018    Procedure: COMBINED BRONCHOSCOPY, DILATE BRONCHUS, STENT BRONCHUS;  Flexible Bronchoscopy, Bronchial Washings, Balloon Dilation;  Surgeon: Wilber Lin MD;  [facility]     BRONCHOSCOPY, DILATE BRONCHUS, STENT BRONCHUS, COMBINED N/A 8/20/2018    Procedure: COMBINED BRONCHOSCOPY, DILATE BRONCHUS, STENT BRONCHUS;  Flexible Bronchoscopy, Balloon Dilation;  Surgeon: Wilber Lin MD;  [facility]     BRONCHOSCOPY, DILATE BRONCHUS, STENT BRONCHUS, COMBINED N/A 10/29/2018    Procedure: Flexible Bronchoscopy, Balloon Dilation, Stent Revision, Airway Examination And Therapeutic Suctioning, Cyro Tumor Debulking;  Surgeon: Wilber Lin MD;  [facility]     BRONCHOSCOPY, DILATE BRONCHUS, STENT BRONCHUS, COMBINED N/A 11/20/2018    Procedure: Rigid Bronchoscopy, Stent Removal and dilitation;  Surgeon: Wilber Lin MD;  [facility]     BRONCHOSCOPY, DILATE BRONCHUS, STENT BRONCHUS, COMBINED N/A 12/14/2018    Procedure: Flexible And Rigid Bronchoscopy, Balloon Dilation, Bronchial Washing;  Surgeon: Wilber Lin MD;  [facility]     BRONCHOSCOPY, DILATE BRONCHUS, STENT BRONCHUS, COMBINED N/A 1/17/2019    Procedure: Flexible And Rigid Bronchoscopy, Balloon Dilation.;  Surgeon: Wilber Lin MD;  [facility]     BRONCHOSCOPY, DILATE BRONCHUS, STENT BRONCHUS, COMBINED N/A 3/7/2019    Procedure: Flexible and Rigid Bronchoscopy, Bronchial Washing, Balloon Dilation;  Surgeon: Wilber Lin MD;  [facility]     BRONCHOSCOPY, DILATE BRONCHUS, STENT BRONCHUS, COMBINED N/A 6/6/2019    Procedure: Rigid and Flexible Bronchoscopy, Balloon Dilation;  Surgeon: Delia  Wilber Warner MD;  [facility]     BRONCHOSCOPY, DILATE BRONCHUS, STENT BRONCHUS, COMBINED N/A 10/11/2019    Procedure: Flexible and Rigid Bronchoscopy, Balloon Dilation, Bronchoalveolar Lagave;  Surgeon: Wilber Lin MD;  Location: [facility]     ENT SURGERY      tonsillectomy as a child     ESOPHAGOSCOPY, GASTROSCOPY, DUODENOSCOPY (EGD), COMBINED N/A 10/29/2018    Procedure: COMBINED ESOPHAGOSCOPY, GASTROSCOPY, DUODENOSCOPY (EGD) with biopsies and polypectomy;  Surgeon: Chente Bloom MD;  [facility]     INSERT EXTRACORPORAL MEMBRANE OXYGENATOR ADULT (OUTSIDE OR) N/A 2/27/2018    Procedure: INSERT EXTRACORPORAL MEMBRANE OXYGENATOR ADULT (OUTSIDE OR);  INSERT EXTRACORPORAL MEMBRANE OXYGENATOR ADULT (OUTSIDE OR) ;  Surgeon: Toby Hernandez MD;  Location[facility]     IR CVC TUNNEL PLACEMENT > 5 YRS OF AGE  10/25/2019     IR PARACENTESIS  1/8/2020     IR THORACENTESIS  9/13/2019     IR TRANSCATHETER BIOPSY  1/8/2020     no prior surgery       REMOVE EXTRACORPORAL MEMBRANE OXYGENATOR ADULT N/A 3/3/2018    Procedure: REMOVE EXTRACORPORAL MEMBRANE OXYGENATOR ADULT;  Removal of Right Femoral Venous and Right Axillary Arterial Extracorporeal Membrane Oxygenator;  Surgeon: Toby Hernandez MD;  [facility]     TRANSPLANT LUNG RECIPIENT SINGLE X2 Bilateral 3/1/2018    Procedure: TRANSPLANT LUNG RECIPIENT SINGLE X2;  Median Sternotomy, Extracorporeal Membrane Oxygenator, bilateral sequential lung transplant;  Surgeon: Toby Hernandez MD;  [facility]          Medications  Prior to Admission medications    Medication Sig Start Date End Date Taking? Authorizing Provider   calcium-vitamin D (CALTRATE) 600-400 MG-UNIT per tablet Take 1 tablet by mouth daily 8/1/18  Yes Ame Chow PA-C   carvedilol (COREG) 25 MG tablet Take 1 tablet (25 mg) by mouth 2 times daily (with meals) 12/18/19  Yes Srinivas Mcleroy MD   dapsone (ACZONE) 100 MG tablet Take 1  tablet (100 mg) by mouth daily 12/3/19  Yes Ame Chow PA-C   ferrous sulfate (FEROSUL) 325 (65 Fe) MG tablet Take 1 tablet (325 mg) by mouth daily (with breakfast) 9/15/19  Yes Christelle Lopez MD   magnesium oxide (MAG-OX) 400 MG tablet Take 400 mg by mouth daily    Yes Unknown, Entered By History   multivitamin, therapeutic with minerals (THERA-VIT-M) TABS tablet Take 1 tablet by mouth daily 5/24/18  Yes Alex Hale MD   ondansetron (ZOFRAN) 4 MG tablet Take 1 tablet (4 mg) by mouth every 12 hours as needed for nausea 12/3/19  Yes Ame Chow PA-C   pantoprazole (PROTONIX) 40 MG EC tablet Take 1 tablet (40 mg) by mouth 2 times daily 6/24/19  Yes Ame Chow PA-C   predniSONE 2.5 MG PO tablet Take two tablets (5mg) every AM and one tablet (2.5mg) every evening 2/19/20  Yes Srinivas Mcelroy MD   senna-docusate (SENOKOT-S/PERICOLACE) 8.6-50 MG tablet Take 2 tablets by mouth 2 times daily as needed for constipation 9/15/19  Yes Christelle Lopez MD   tacrolimus (GENERIC EQUIVALENT) 0.5 MG capsule Take 1 capsule (0.5 mg) by mouth every evening Total dose: 1 mg in the AM and 0.5 mg in the PM 12/30/19  Yes Ame Chow PA-C   tacrolimus (GENERIC EQUIVALENT) 1 MG capsule Take 1 capsule (1 mg) by mouth every morning Total dose: 1 mg in the AM and 0.5 mg in the PM 12/30/19  Yes Ame Chow PA-C   voriconazole (VFEND) 200 MG tablet Take 1 tablet (200 mg) by mouth 2 times daily TOTAL DOSE: 250 mg every 12 hours 11/18/19  Yes Dean Riley MD   voriconazole (VFEND) 50 MG tablet Take 1 tablet (50 mg) by mouth 2 times daily TOTAL DOSE: 250 mg every 12 hours 11/18/19  Yes Dean Riley MD   amoxicillin-clavulanate 500-125 MG PO tablet Take 1 tablet by mouth daily 2/19/20   Srinivas Mcelroy MD   azithromycin 250 MG PO tablet Take 1 tablet (250 mg) by mouth daily 2/19/20   Srinivas Mcelroy MD       Allergies  No Known Allergies    Review of  "systems  A 10-point review of systems was negative    Examination  /70   Pulse 86   Temp 97.7  F (36.5  C) (Oral)   Ht 1.626 m (5' 4\")   Wt 54 kg (119 lb)   LMP 06/07/2014 (Exact Date)   SpO2 95%   BMI 20.43 kg/m     Body mass index is 20.43 kg/m .    Gen- well, NAD, A+Ox3, normal color  Lym- no palpable LAD  CVS- RRR  RS- CTA  Abd- Distended. Positive shifting dullness.  Extr- hands normal, no DENIS  Skin- no rash or jaundice  Neuro- no asterixis  Psych- normal mood    Laboratory  Lab Results   Component Value Date     02/19/2020    POTASSIUM 4.1 02/19/2020    CHLORIDE 104 02/19/2020    CO2 32 02/19/2020    BUN 26 02/19/2020    CR 2.45 02/19/2020       Lab Results   Component Value Date    BILITOTAL 0.6 02/19/2020    ALT 30 02/19/2020    AST 30 02/19/2020    ALKPHOS 498 02/19/2020       Lab Results   Component Value Date    ALBUMIN 3.3 02/19/2020    PROTTOTAL 7.3 02/19/2020        Lab Results   Component Value Date    WBC 6.7 02/19/2020    HGB 11.8 02/19/2020     02/19/2020     02/19/2020       Lab Results   Component Value Date    INR 1.03 02/19/2020     MELD-Na score: 15 at 2/19/2020  7:13 AM  MELD score: 15 at 2/19/2020  7:13 AM  Calculated from:  Serum Creatinine: 2.45 mg/dL at 2/19/2020  7:13 AM  Serum Sodium: 140 mmol/L (Rounded to 137 mmol/L) at 2/19/2020  7:13 AM  Total Bilirubin: 0.6 mg/dL (Rounded to 1 mg/dL) at 2/19/2020  7:13 AM  INR(ratio): 1.03 at 2/19/2020  7:13 AM  Age: 59 years    Radiology    ASSESSMENT AND PLAN:  Portal hypertension with new onset ascites.  Her portal hypertension was investigated.  In fact, she had portal pressure measurements done by Dr. Fields from IR and he measured that her wedge pressure was 21 mmHg and her free hepatic vein pressures were 7.  This was significant and the transjugular liver biopsy showed congestive hepatopathy, which goes for her previous history of pulmonary hypertension.  She will see Dr. Schaffer regarding that and we " await his input.  She was also seen by her lung transplant doctor, Dr. Srinivas Mcelroy, on the same day.  She will also have further workup by Pulmonary.  Regardless, optimizing her heart might improve her ascites.  Otherwise, she will have as-needed paracentesis and we will see her here in 6 months.        PLAN:  We await Dr. Schaffer's input, including a right heart catheterization that was to be scheduled.      This was a 40-minute visit of which more than 50% was spent in explaining to the patient our plan of care.  We answered all of her questions and she will be seen here in 6 months.       Feng Denise MD  Hepatology  [facility]    Again, thank you for allowing me to participate in the care of your patient.      Sincerely,    Feng Denise MD

## 2020-02-19 NOTE — NURSING NOTE
Right Heart Catheterization: Patient was instructed regarding right heart catheterization, including discussion of the procedure, preparation, intra-procedural steps, and recovery at home. Patient demonstrated understanding of this information and agreed to call with further questions or concerns.  Med Reconcile: Reviewed and verified all current medications with the patient. The updated medication list was printed and given to the patient.  Labs: Patient was given results of the laboratory testing obtained today. Patient demonstrated understanding of this information and agreed to call with further questions or concerns.   Diet: Patient instructed regarding a heart healthy diet, including discussion of reduced fat and sodium intake. Patient demonstrated understanding of this information and agreed to call with further questions or concerns.  Return Appointment: Patient given instructions regarding scheduling next clinic visit. Patient demonstrated understanding of this information and agreed to call with further questions or concerns.  Patient stated she understood all health information given and agreed to call with further questions or concerns.  Medication Changes:  No medication changes at this time. Please continue current medication regiment.      Patient Instructions:  1. Continue staying active and eat a heart healthy diet.    2. Please keep current list of medications with you at all times.    3. Remember to weigh yourself daily after voiding and before you consume any food or beverages and log the numbers.  If you have gained 2 pounds overnight or 5 pounds in a week contact us immediately for medication adjustments or further instructions.    4. **Please call us immediately if you have any syncope (fainting or passing out), chest pain, edema (swelling or weight gain), or decline in your functional status (general decline in how you are feeling).    Check-In  Time Check-In Location Estimated Length  Procedure   Name     2/20/20 @ 11am   [facility]  waiting room 60-90 minutes Right Heart Catheterization**     Procedure Preparations & Instructions     This is an invasive procedure that DOES require preparation:    - Nothing to eat for 6 hours   - You may have clear liquids up until the time of your procedure  - A ride should be arranged for you in the instance you are unable to drive home, however you should be able to function as you normally would after the procedure       Follow up Appointment Information:  -We will talk on the phone about the results and discuss plan at that time.      *patient had extensive labs drawn today, so lab appt was cancelled for before [facility] tomorrow.

## 2020-02-19 NOTE — LETTER
2/19/2020      RE: Kecia Pintovickiedavid  [address] [place] [address]       Dear Colleague,    Thank you for the opportunity to participate in the care of your patient, Kecia Blue, at the [facility] HEART [facility]. Please see a copy of my visit note below.    Did not keep appointment  Answers for HPI/ROS submitted by the patient on 2/19/2020   General Symptoms: No  Skin Symptoms: No  HENT Symptoms: Yes  EYE SYMPTOMS: No  HEART SYMPTOMS: No  LUNG SYMPTOMS: Yes  INTESTINAL SYMPTOMS: No  URINARY SYMPTOMS: No  GYNECOLOGIC SYMPTOMS: No  BREAST SYMPTOMS: No  SKELETAL SYMPTOMS: No  BLOOD SYMPTOMS: No  NERVOUS SYSTEM SYMPTOMS: No  MENTAL HEALTH SYMPTOMS: No  Ear pain: No  Ear discharge: No  Tooth pain: No  Gum tenderness: No  Bleeding gums: No  Change in taste: No  Change in sense of smell: No  Dry mouth: No  Hearing aid used: No  Neck lump: No  Cough: Yes  Sputum or phlegm: Yes  Coughing up blood: No  Difficulty breating or shortness of breath: No  Snoring: No  Wheezing: Yes  Difficulty breathing on exertion: No  Nighttime Cough: Yes  Difficulty breathing when lying flat: No      Please do not hesitate to contact me if you have any questions/concerns.     Sincerely,     Humberto Schaffer MD

## 2020-02-19 NOTE — LETTER
2/19/2020       RE: Kecia Blue  [id number] [place] [address]     Dear Colleague,    Thank you for referring your patient, Kecia Blue, to the [facility]ID [facility]GAN TRANSPLANT at [facility]. Please see a copy of my visit note below.        [facility]  Pulmonary Medicine/Lung Transplant  February 19, 2020       Today's visit note:       ASSESSMENT/PLAN:    1.  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  She is currently on 2 drug immune suppression (tacrolimus and prednisone) because of recurrent leukopenia and infections.    2.  ID prophylaxis includes dapsone for PJP which was started during her 10/2019 hospitalization (G6PD level is normal).     3.  Productive cough with accompanying upper respiratory symptoms.  In view of her CT findings today that include increased right lower lobe and left lower lobe tree-in-bud and ground-glass opacities, I am concluding that there is a lower respiratory tract infection which is most likely bacterial.  Nasal swab for respiratory viral panel was obtained today.  The patient was given a cup and will bring a sputum sample in tomorrow for bacterial and fungal cultures.  Regarding fungal infection, she is already being treated with voriconazole for previous Aspergillus empyema.  Today's CT scan does not show an increase in the left pleural effusion, which seems to make it less likely that the parenchymal problem is fungal.  I began antibacterial treatment with Augmentin (renally adjusted) and azithromycin for a planned 2-week course at which time she will be reassessed with CT scan, PFTs and exam.     4.  Pulmonary hypertension.  She was seen by Dr. Schaffer in Pulmonary Hypertension Clinic this morning in anticipation of a planned right heart catheterization tomorrow.  However, we have  canceled that procedure in view of her active pulmonary process and have tentatively rescheduled it for 2 weeks from tomorrow.     5.  Ascites and portal hypertension, thought to be related to chronic right-sided heart pressures.  Please refer to Hepatology and Cardiology notes from today for details.     6.  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  She will continue voriconazole for an undefined period of time.  Again, CT scan today did not appear to show an increase in left pleural effusion which is encouraging.     7.  History of CMV viremia.  Her most recent CMV PCR was on 02/11/2020 and was below the detectable level in their lab of 35 copies per mL.  Today's PCR is pending.     8.  History of EBV viremia, with today's result in process.     9.  Dialysis-dependent renal failure.  The patient reports that her dialysis team is encouraged by the fact that her urine output appears to be increasing.     10.  History of hypertension, being treated with amlodipine, carvedilol and Imdur.     11.  Health care maintenance.  Vaccinations were not administered today.      The patient will return to see Dr. Christensen in 2 weeks with a repeat CT scan at that time.     Please also refer to RN Transplant Coordinator note for additional information related to this visit.  PATIENT PROFILE AND TRANSPLANT HISTORY:  Current age:                    59 year old  Underlying lung disease: IIP: Nonspecific Interstitial Pneumonia    Transplant date(s):        3/1/2018 (Lung)  Transplant POD(s):        719  Lung transplant type(s):       Transplant coordinator:   Mikayla Latham  Transplant provider(s):        Ame Chow    Active Patient Thresholds     Lab Low High Effective Since Comment    Tacrolimus Level 8 10 06/06/2019 6/6/19 CP          INTERVAL HISTORY:  --Most recent resulted PRA: 12/18/19, neg for DSA    --Most recent lung transplant clinic visit: 12/18/19 ([facility])    --Interval  "clinic visits, test results, signif medical events (obtained by chart review and patient hx):    12/18/19: ID clinic-->continue vori and repeat CT    1/8/19: transjugular liver biopsy and paracentesis  Pressures:  Hepatic Wedge - 21  Hepatic Free - 7  RA - 4  From path report: \"Congestive hepatopathy with moderate zone 3 pericellular fibrosis\"    --Today:    The patient was seen in clinic today for a followup visit.  She was accompanied by her , Ranjan.  The patient reports that her breathing and exercise tolerance have remained about the same since her visit with me in 2019.  However, she had an increase from her baseline cough about 2-3 weeks ago, which was accompanied by upper respiratory symptoms.  She is producing mucoid sputum which is not frankly purulent.  She is not having hemoptysis.  She has not been having fever, chills or other systemic symptoms.      REVIEW OF SYSTEMS:   1.  She continues to require intermittent paracenteses for ascites; the most recent one was done yesterday and was the first one she had had in about a month.   2.  She reports that dialysis is \"going fine.\"  She is producing urine which seems to be gradually increasing in quantity.   3.  Complete review of systems was asked and was otherwise negative.     PHYSICAL EXAM:    Vital signs:  Temp: 97.7  F (36.5  C) Temp src: Oral BP: 102/70 Pulse: 86     SpO2: 95 %(on room air)       Weight: 54.2 kg (119 lb 6.4 oz)  Estimated body mass index is 20.48 kg/m  as calculated from the following:    Height as of 1/8/20: 1.626 m (5' 4.02\").    Weight as of this encounter: 54.2 kg (119 lb 6.4 oz).      Constitutional:    Awake, alert and in no apparent distress breathing room air at rest.   Eyes:    Anicteric, PERRL   ENT:    oral mucosa moist without lesions    Neck:    Supple without supraclavicular or cervical lymphadenopathy    Lungs:    Good air entry both lungs. No crackles. + rub vs rhoncus over right posterior chest.  No rhonchi. + " wheezes on forced expiration.   Cardiovascular:    Normal S1 and S2. No JVD, murmur, rub, chavez. RRR   Abdomen:    NABS, soft, nontender, nondistended. No HSM.    Musculoskeletal:    No edema.    Neurologic:    Alert and conversant.    Skin:    Warm, dry. No rash seen. Sclerodactyly.          Data:     I reviewed all resulted lab tests and imaging studies.    No results found for any visits on 02/19/20.    PULMONARY FUNCTION TEST INTERPRETATION:    Pulmonary function tests dated 2/19/2020 were reviewed and interpreted by me.  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  When compared with this patient's most recent previous spirometry, dated 12/18/2019, there has been an approximately 10% decrease in FEV1.    SIX-MINUTE WALK TEST INTERPRETATION:    6-minute walk test results were reviewed and interpreted by me.  Patient walked a total of 1025 feet while breathing room air (lower limit of normal 1469 feet).  There was no O2 desaturation during the test.  The test results are considered abnormal in view of the lower than predicted total walk distance    STUDIES STILL PENDING AT THE TIME OF THIS NOTE:  Unresulted Labs Ordered in the Past 30 Days of this Admission     No orders found from 1/20/2020 to 2/20/2020.          Complexity indicators:    --immune compromised, on high-risk medications x   --organ transplant recipient x   --multiple organ transplant recipient    --active respiratory infection    --within one year of transplant; and/or within one month of hospitalization    --chronic lung allograft dysfunction syndrome (CLAD, chronic rejection, or bronchiolitis obliterans syndrome)    --new medical problem addressed during this visit    --multiple active medical problems    --admitted directly to hospital from this clinic visit    -->50% of this visit was spent in counseling and care coordination. If yes, total visit time  was              Medications:     Current Outpatient Medications   Medication     calcium-vitamin D (CALTRATE) 600-400 MG-UNIT per tablet     carvedilol (COREG) 25 MG tablet     dapsone (ACZONE) 100 MG tablet     ferrous sulfate (FEROSUL) 325 (65 Fe) MG tablet     magnesium oxide (MAG-OX) 400 MG tablet     multivitamin, therapeutic with minerals (THERA-VIT-M) TABS tablet     ondansetron (ZOFRAN) 4 MG tablet     pantoprazole (PROTONIX) 40 MG EC tablet     predniSONE (DELTASONE) 2.5 MG tablet     senna-docusate (SENOKOT-S/PERICOLACE) 8.6-50 MG tablet     tacrolimus (GENERIC EQUIVALENT) 0.5 MG capsule     tacrolimus (GENERIC EQUIVALENT) 1 MG capsule     voriconazole (VFEND) 200 MG tablet     voriconazole (VFEND) 50 MG tablet     No current facility-administered medications for this visit.             Past Medical and Surgical History:     Past Medical History:   Diagnosis Date     Antisynthetase syndrome (H) 2014     Chronic cough      Chronic infection     recent C diff  8/18     Dehydration 8/1/2018     Dermatomyositis (H)      Dysplasia of cervix, low grade (ESTRADA 1)      ILD (interstitial lung disease) (H)      Osteopenia      PONV (postoperative nausea and vomiting)      Pulmonary hypertension (H)      Raynaud's disease      Seronegative rheumatoid arthritis (H)      Past Surgical History:   Procedure Laterality Date     BRONCHOSCOPY (RIGID OR FLEXIBLE), DIAGNOSTIC N/A 4/10/2018    Procedure: COMBINED BRONCHOSCOPY (RIGID OR FLEXIBLE), LAVAGE;;  Surgeon: Mariposa Donohue MD;  Location:  GI     BRONCHOSCOPY (RIGID OR FLEXIBLE), DILATE BRONCHUS / TRACHEA N/A 10/11/2018    Procedure: BRONCHOSCOPY (RIGID OR FLEXIBLE), DILATE BRONCHUS / TRACHEA;  Flexible And Rigid Bronchoscopy and Dilation;  Surgeon: Wilber Lin MD;  [facility]     BRONCHOSCOPY FLEXIBLE N/A 3/13/2018    Procedure: BRONCHOSCOPY FLEXIBLE;  Flexible Bronchoscopy ;  Surgeon: Gissell Sanchez MD;  [facility]     BRONCHOSCOPY  FLEXIBLE N/A 5/9/2018    Procedure: BRONCHOSCOPY FLEXIBLE;;  Surgeon: Wilber Lin MD;  [facility]: [facility]     BRONCHOSCOPY FLEXIBLE AND RIGID N/A 9/10/2018    Procedure: BRONCHOSCOPY FLEXIBLE AND RIGID;  Flexible and Rigid Bronchoscopy with Balloon Dilation, tissue debulking with cryo, and Right mainstem bronchus stent placement;  Surgeon: Wilbre Lin MD;  [facility]     BRONCHOSCOPY RIGID N/A 6/6/2018    Procedure: BRONCHOSCOPY RIGID;;  Surgeon: Lopez Macias MD;  [facility]     BRONCHOSCOPY, DILATE BRONCHUS, STENT BRONCHUS, COMBINED N/A 6/11/2018    Procedure: COMBINED BRONCHOSCOPY, DILATE BRONCHUS, STENT BRONCHUS;  Flexible Bronchoscopy, Balloon Dilation, Bronchial Washings;  Surgeon: Wilber Lin MD;  [facility]     BRONCHOSCOPY, DILATE BRONCHUS, STENT BRONCHUS, COMBINED Right 7/10/2018    Procedure: COMBINED BRONCHOSCOPY, DILATE BRONCHUS, STENT BRONCHUS;  Flexible Bronchoscopy, Balloon Dilation, Bronchial Washings  ;  Surgeon: Wilber Lin MD;  [facility]     BRONCHOSCOPY, DILATE BRONCHUS, STENT BRONCHUS, COMBINED N/A 8/2/2018    Procedure: COMBINED BRONCHOSCOPY, DILATE BRONCHUS, STENT BRONCHUS;  Flexible Bronchoscopy, Bronchial Washings, Balloon Dilation;  Surgeon: Wilber Lin MD;  [facility]     BRONCHOSCOPY, DILATE BRONCHUS, STENT BRONCHUS, COMBINED N/A 8/20/2018    Procedure: COMBINED BRONCHOSCOPY, DILATE BRONCHUS, STENT BRONCHUS;  Flexible Bronchoscopy, Balloon Dilation;  Surgeon: Wilber Lin MD;  [facility]     BRONCHOSCOPY, DILATE BRONCHUS, STENT BRONCHUS, COMBINED N/A 10/29/2018    Procedure: Flexible Bronchoscopy, Balloon Dilation, Stent Revision, Airway Examination And Therapeutic Suctioning, Cyro Tumor Debulking;  Surgeon: Wilber Lin MD;  [facility]     BRONCHOSCOPY, DILATE BRONCHUS, STENT BRONCHUS, COMBINED N/A 11/20/2018    Procedure: Rigid Bronchoscopy, Stent Removal and  dilitation;  Surgeon: Wilber Lin MD;  [facility]     BRONCHOSCOPY, DILATE BRONCHUS, STENT BRONCHUS, COMBINED N/A 12/14/2018    Procedure: Flexible And Rigid Bronchoscopy, Balloon Dilation, Bronchial Washing;  Surgeon: Wilber Lin MD;  [facility]     BRONCHOSCOPY, DILATE BRONCHUS, STENT BRONCHUS, COMBINED N/A 1/17/2019    Procedure: Flexible And Rigid Bronchoscopy, Balloon Dilation.;  Surgeon: Wilber Lin MD;  [facility]     BRONCHOSCOPY, DILATE BRONCHUS, STENT BRONCHUS, COMBINED N/A 3/7/2019    Procedure: Flexible and Rigid Bronchoscopy, Bronchial Washing, Balloon Dilation;  Surgeon: Wilber Lin MD;  [facility]     BRONCHOSCOPY, DILATE BRONCHUS, STENT BRONCHUS, COMBINED N/A 6/6/2019    Procedure: Rigid and Flexible Bronchoscopy, Balloon Dilation;  Surgeon: Wilber Lin MD;  [facility]     BRONCHOSCOPY, DILATE BRONCHUS, STENT BRONCHUS, COMBINED N/A 10/11/2019    Procedure: Flexible and Rigid Bronchoscopy, Balloon Dilation, Bronchoalveolar Lagave;  Surgeon: Wilber Lin MD;  [facility]     ENT SURGERY      tonsillectomy as a child     ESOPHAGOSCOPY, GASTROSCOPY, DUODENOSCOPY (EGD), COMBINED N/A 10/29/2018    Procedure: COMBINED ESOPHAGOSCOPY, GASTROSCOPY, DUODENOSCOPY (EGD) with biopsies and polypectomy;  Surgeon: Chente Bloom MD;  [facility]     INSERT EXTRACORPORAL MEMBRANE OXYGENATOR ADULT (OUTSIDE OR) N/A 2/27/2018    Procedure: INSERT EXTRACORPORAL MEMBRANE OXYGENATOR ADULT (OUTSIDE OR);  INSERT EXTRACORPORAL MEMBRANE OXYGENATOR ADULT (OUTSIDE OR) ;  Surgeon: Toby Hernandez MD;  [facility]     IR CVC TUNNEL PLACEMENT > 5 YRS OF AGE  10/25/2019     IR PARACENTESIS  1/8/2020     IR THORACENTESIS  9/13/2019     IR TRANSCATHETER BIOPSY  1/8/2020     no prior surgery       REMOVE EXTRACORPORAL MEMBRANE OXYGENATOR ADULT N/A 3/3/2018    Procedure: REMOVE EXTRACORPORAL MEMBRANE OXYGENATOR  "ADULT;  Removal of Right Femoral Venous and Right Axillary Arterial Extracorporeal Membrane Oxygenator;  Surgeon: Toby Hernandez MD;  [facility]     TRANSPLANT LUNG RECIPIENT SINGLE X2 Bilateral 3/1/2018    Procedure: TRANSPLANT LUNG RECIPIENT SINGLE X2;  Median Sternotomy, Extracorporeal Membrane Oxygenator, bilateral sequential lung transplant;  Surgeon: Toby Hernandez MD;  [facility]           Family History:     Family History   Problem Relation Age of Onset     Hypertension Mother      Arthritis Mother      Pancreatic Cancer Father      Diabetes Father                            Service Date: 02/19/2020      INTERVAL HISTORY:  The patient was seen in clinic today for a followup visit.  She was accompanied by her , Ranjan.  The patient reports that her breathing and exercise tolerance have remained about the same since her visit with me in 2019.  However, she had an increase from her baseline cough about 2-3 weeks ago, which was accompanied by upper respiratory symptoms.  She is producing mucoid sputum which is not frankly purulent.  She is not having hemoptysis.  She has not been having fever, chills or other systemic symptoms.      REVIEW OF SYSTEMS:   1.  She continues to require intermittent paracenteses for ascites; the most recent one was done yesterday and was the first one she had had in about a month.   2.  She reports that dialysis is \"going fine.\"  She is producing urine which seems to be gradually increasing in quantity.   3.  Complete review of systems was asked and was otherwise negative.      ASSESSMENT AND PLAN:   1.  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  She is currently on 2 drug immune suppression (tacrolimus and prednisone) because o   2.  ID prophylaxis includes dapsone for PJP which was started during her 10/2019 hospitalization (G6PD level is " normal).   3.  Productive cough with accompanying upper respiratory symptoms.  In view of her CT findings today that include increased right lower lobe and left lower lobe tree-in-bud and ground-glass opacities, I am concluding that there is a lower respiratory tract infection which is most likely bacterial.  Nasal swab for respiratory viral panel was obtained today.  The patient was given a cup and will bring a sputum sample in tomorrow for bacterial and fungal cultures.  Regarding fungal infection, she is already being treated with voriconazole for previous Aspergillus empyema.  Today's CT scan does not show an increase in the left pleural effusion, which seems to make it less likely that the parenchymal problem is fungal.  I began antibacterial treatment with Augmentin (renally adjusted) and azithromycin for a planned 2-week course at which time she will be reassessed with CT scan, PFTs and exam.   4.  Pulmonary hypertension.  She was seen by Dr. Schaffer in Pulmonary Hypertension Clinic this morning in anticipation of a planned right heart catheterization tomorrow.  However, we have canceled that procedure in view of her active pulmonary process and have tentatively rescheduled it for 2 weeks from tomorrow.   5.  Ascites and portal hypertension, thought to be related to chronic right-sided heart pressures.  Please refer to Hepatology and Cardiology notes from today for details.   6.  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  She will continue voriconazole for an undefined period of time.  Again, CT scan today did not appear to show an increase in left pleural effusion which is encouraging.   7.  History of CMV viremia.  Her most recent CMV PCR was on 02/11/2020 and was below the detectable level in their lab of 35 copies per mL.  Today's PCR is pending.   8.  History of EBV viremia, with today's result in process.   9.  Dialysis-dependent renal failure.  The patient reports  that her dialysis team is encouraged by the fact that her urine output appears to be increasing.   10.  History of hypertension, being treated with amlodipine, carvedilol and Imdur.   11.  Health care maintenance.  Vaccinations were not administered today.      The patient will return to see Dr. Christensen in 2 weeks with a repeat CT scan at that time.         SRINIVAS DUDLEY MD             D: 2020   T: 2020   MT: al      Name:     SARAI KILLIAN   MRN:      [id number]        Account:      [id number]   :      1960           Service Date: 2020      Document: [id number]        Again, thank you for allowing me to participate in the care of your patient.      Sincerely,    Srinivas Dudley MD

## 2020-02-19 NOTE — LETTER
2/19/2020      RE: Kecia Blue  [address] [place] [address]           [facility]  Pulmonary Medicine/Lung Transplant  February 19, 2020       Today's visit note:       ASSESSMENT/PLAN:    1.  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  She is currently on 2 drug immune suppression (tacrolimus and prednisone) because of recurrent leukopenia and infections.    2.  ID prophylaxis includes dapsone for PJP which was started during her 10/2019 hospitalization (G6PD level is normal).     3.  Productive cough with accompanying upper respiratory symptoms.  In view of her CT findings today that include increased right lower lobe and left lower lobe tree-in-bud and ground-glass opacities, I am concluding that there is a lower respiratory tract infection which is most likely bacterial.  Nasal swab for respiratory viral panel was obtained today.  The patient was given a cup and will bring a sputum sample in tomorrow for bacterial and fungal cultures.  Regarding fungal infection, she is already being treated with voriconazole for previous Aspergillus empyema.  Today's CT scan does not show an increase in the left pleural effusion, which seems to make it less likely that the parenchymal problem is fungal.  I began antibacterial treatment with Augmentin (renally adjusted) and azithromycin for a planned 2-week course at which time she will be reassessed with CT scan, PFTs and exam.     4.  Pulmonary hypertension.  She was seen by Dr. Schaffer in Pulmonary Hypertension Clinic this morning in anticipation of a planned right heart catheterization tomorrow.  However, we have canceled that procedure in view of her active pulmonary process and have tentatively rescheduled it for 2 weeks from tomorrow.     5.  Ascites and portal hypertension, thought to be related to chronic right-sided  heart pressures.  Please refer to Hepatology and Cardiology notes from today for details.     6.  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  She will continue voriconazole for an undefined period of time.  Again, CT scan today did not appear to show an increase in left pleural effusion which is encouraging.     7.  History of CMV viremia.  Her most recent CMV PCR was on 02/11/2020 and was below the detectable level in their lab of 35 copies per mL.  Today's PCR is pending.     8.  History of EBV viremia, with today's result in process.     9.  Dialysis-dependent renal failure.  The patient reports that her dialysis team is encouraged by the fact that her urine output appears to be increasing.     10.  History of hypertension, being treated with amlodipine, carvedilol and Imdur.     11.  Health care maintenance.  Vaccinations were not administered today.      The patient will return to see Dr. Christensen in 2 weeks with a repeat CT scan at that time.     Please also refer to RN Transplant Coordinator note for additional information related to this visit.  PATIENT PROFILE AND TRANSPLANT HISTORY:  Current age:                    59 year old  Underlying lung disease: IIP: Nonspecific Interstitial Pneumonia    Transplant date(s):        3/1/2018 (Lung)  Transplant POD(s):        719  Lung transplant type(s):       Transplant coordinator:   Mikayla Latham  Transplant provider(s):        Ame Chow    Active Patient Thresholds     Lab Low High Effective Since Comment    Tacrolimus Level 8 10 06/06/2019 6/6/19 CP          INTERVAL HISTORY:  --Most recent resulted PRA: 12/18/19, neg for DSA    --Most recent lung transplant clinic visit: 12/18/19 ([facility])    --Interval clinic visits, test results, signif medical events (obtained by chart review and patient hx):    12/18/19: ID clinic-->continue vori and repeat CT    1/8/19: transjugular liver biopsy and  "paracentesis  Pressures:  Hepatic Wedge - 21  Hepatic Free - 7  RA - 4  From path report: \"Congestive hepatopathy with moderate zone 3 pericellular fibrosis\"    --Today:    The patient was seen in clinic today for a followup visit.  She was accompanied by her , Ranjan.  The patient reports that her breathing and exercise tolerance have remained about the same since her visit with me in 2019.  However, she had an increase from her baseline cough about 2-3 weeks ago, which was accompanied by upper respiratory symptoms.  She is producing mucoid sputum which is not frankly purulent.  She is not having hemoptysis.  She has not been having fever, chills or other systemic symptoms.      REVIEW OF SYSTEMS:   1.  She continues to require intermittent paracenteses for ascites; the most recent one was done yesterday and was the first one she had had in about a month.   2.  She reports that dialysis is \"going fine.\"  She is producing urine which seems to be gradually increasing in quantity.   3.  Complete review of systems was asked and was otherwise negative.     PHYSICAL EXAM:    Vital signs:  Temp: 97.7  F (36.5  C) Temp src: Oral BP: 102/70 Pulse: 86     SpO2: 95 %(on room air)       Weight: 54.2 kg (119 lb 6.4 oz)  Estimated body mass index is 20.48 kg/m  as calculated from the following:    Height as of 1/8/20: 1.626 m (5' 4.02\").    Weight as of this encounter: 54.2 kg (119 lb 6.4 oz).      Constitutional:    Awake, alert and in no apparent distress breathing room air at rest.   Eyes:    Anicteric, PERRL   ENT:    oral mucosa moist without lesions    Neck:    Supple without supraclavicular or cervical lymphadenopathy    Lungs:    Good air entry both lungs. No crackles. + rub vs rhoncus over right posterior chest.  No rhonchi. + wheezes on forced expiration.   Cardiovascular:    Normal S1 and S2. No JVD, murmur, rub, chavez. RRR   Abdomen:    NABS, soft, nontender, nondistended. No HSM.    Musculoskeletal:    No " edema.    Neurologic:    Alert and conversant.    Skin:    Warm, dry. No rash seen. Sclerodactyly.          Data:     I reviewed all resulted lab tests and imaging studies.    No results found for any visits on 02/19/20.    PULMONARY FUNCTION TEST INTERPRETATION:    Pulmonary function tests dated 2/19/2020 were reviewed and interpreted by me.  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  When compared with this patient's most recent previous spirometry, dated 12/18/2019, there has been an approximately 10% decrease in FEV1.    SIX-MINUTE WALK TEST INTERPRETATION:    6-minute walk test results were reviewed and interpreted by me.  Patient walked a total of 1025 feet while breathing room air (lower limit of normal 1469 feet).  There was no O2 desaturation during the test.  The test results are considered abnormal in view of the lower than predicted total walk distance    STUDIES STILL PENDING AT THE TIME OF THIS NOTE:  Unresulted Labs Ordered in the Past 30 Days of this Admission     No orders found from 1/20/2020 to 2/20/2020.          Complexity indicators:    --immune compromised, on high-risk medications x   --organ transplant recipient x   --multiple organ transplant recipient    --active respiratory infection    --within one year of transplant; and/or within one month of hospitalization    --chronic lung allograft dysfunction syndrome (CLAD, chronic rejection, or bronchiolitis obliterans syndrome)    --new medical problem addressed during this visit    --multiple active medical problems    --admitted directly to hospital from this clinic visit    -->50% of this visit was spent in counseling and care coordination. If yes, total visit time was              Medications:     Current Outpatient Medications   Medication     calcium-vitamin D (CALTRATE) 600-400 MG-UNIT per tablet     carvedilol (COREG) 25 MG tablet     dapsone  (ACZONE) 100 MG tablet     ferrous sulfate (FEROSUL) 325 (65 Fe) MG tablet     magnesium oxide (MAG-OX) 400 MG tablet     multivitamin, therapeutic with minerals (THERA-VIT-M) TABS tablet     ondansetron (ZOFRAN) 4 MG tablet     pantoprazole (PROTONIX) 40 MG EC tablet     predniSONE (DELTASONE) 2.5 MG tablet     senna-docusate (SENOKOT-S/PERICOLACE) 8.6-50 MG tablet     tacrolimus (GENERIC EQUIVALENT) 0.5 MG capsule     tacrolimus (GENERIC EQUIVALENT) 1 MG capsule     voriconazole (VFEND) 200 MG tablet     voriconazole (VFEND) 50 MG tablet     No current facility-administered medications for this visit.             Past Medical and Surgical History:     Past Medical History:   Diagnosis Date     Antisynthetase syndrome (H) 2014     Chronic cough      Chronic infection     recent C diff  8/18     Dehydration 8/1/2018     Dermatomyositis (H)      Dysplasia of cervix, low grade (ESTRADA 1)      ILD (interstitial lung disease) (H)      Osteopenia      PONV (postoperative nausea and vomiting)      Pulmonary hypertension (H)      Raynaud's disease      Seronegative rheumatoid arthritis (H)      Past Surgical History:   Procedure Laterality Date     BRONCHOSCOPY (RIGID OR FLEXIBLE), DIAGNOSTIC N/A 4/10/2018    Procedure: COMBINED BRONCHOSCOPY (RIGID OR FLEXIBLE), LAVAGE;;  Surgeon: Mariposa Donohue MD;  Location:  GI     BRONCHOSCOPY (RIGID OR FLEXIBLE), DILATE BRONCHUS / TRACHEA N/A 10/11/2018    Procedure: BRONCHOSCOPY (RIGID OR FLEXIBLE), DILATE BRONCHUS / TRACHEA;  Flexible And Rigid Bronchoscopy and Dilation;  Surgeon: Wilber Lin MD;  Location: [facility]     BRONCHOSCOPY FLEXIBLE N/A 3/13/2018    Procedure: BRONCHOSCOPY FLEXIBLE;  Flexible Bronchoscopy ;  Surgeon: Gissell Sanchez MD;  Location: [facility] GI     BRONCHOSCOPY FLEXIBLE N/A 5/9/2018    Procedure: BRONCHOSCOPY FLEXIBLE;;  Surgeon: Wilber Lin MD;  Location:  GI     BRONCHOSCOPY FLEXIBLE AND RIGID N/A 9/10/2018    Procedure:  BRONCHOSCOPY FLEXIBLE AND RIGID;  Flexible and Rigid Bronchoscopy with Balloon Dilation, tissue debulking with cryo, and Right mainstem bronchus stent placement;  Surgeon: Wilber Lin MD;  Location: [facility]     BRONCHOSCOPY RIGID N/A 6/6/2018    Procedure: BRONCHOSCOPY RIGID;;  Surgeon: Lopez Macias MD;  Location: [facility]     BRONCHOSCOPY, DILATE BRONCHUS, STENT BRONCHUS, COMBINED N/A 6/11/2018    Procedure: COMBINED BRONCHOSCOPY, DILATE BRONCHUS, STENT BRONCHUS;  Flexible Bronchoscopy, Balloon Dilation, Bronchial Washings;  Surgeon: Wilber Lin MD;  Location: [facility]     BRONCHOSCOPY, DILATE BRONCHUS, STENT BRONCHUS, COMBINED Right 7/10/2018    Procedure: COMBINED BRONCHOSCOPY, DILATE BRONCHUS, STENT BRONCHUS;  Flexible Bronchoscopy, Balloon Dilation, Bronchial Washings  ;  Surgeon: Wilber Lin MD;  Location: [facility]     BRONCHOSCOPY, DILATE BRONCHUS, STENT BRONCHUS, COMBINED N/A 8/2/2018    Procedure: COMBINED BRONCHOSCOPY, DILATE BRONCHUS, STENT BRONCHUS;  Flexible Bronchoscopy, Bronchial Washings, Balloon Dilation;  Surgeon: Wilber Lin MD;  Location: [facility]     BRONCHOSCOPY, DILATE BRONCHUS, STENT BRONCHUS, COMBINED N/A 8/20/2018    Procedure: COMBINED BRONCHOSCOPY, DILATE BRONCHUS, STENT BRONCHUS;  Flexible Bronchoscopy, Balloon Dilation;  Surgeon: Wilber Lin MD;  Location: [facility]     BRONCHOSCOPY, DILATE BRONCHUS, STENT BRONCHUS, COMBINED N/A 10/29/2018    Procedure: Flexible Bronchoscopy, Balloon Dilation, Stent Revision, Airway Examination And Therapeutic Suctioning, Cyro Tumor Debulking;  Surgeon: Wilber Lin MD;  Location: [facility]     BRONCHOSCOPY, DILATE BRONCHUS, STENT BRONCHUS, COMBINED N/A 11/20/2018    Procedure: Rigid Bronchoscopy, Stent Removal and dilitation;  Surgeon: Wilber Lin MD;  Location: [facility]     BRONCHOSCOPY, DILATE BRONCHUS, STENT BRONCHUS, COMBINED N/A 12/14/2018    Procedure: Flexible And Rigid Bronchoscopy,  Balloon Dilation, Bronchial Washing;  Surgeon: Wilber Lin MD;  Location: [facility]     BRONCHOSCOPY, DILATE BRONCHUS, STENT BRONCHUS, COMBINED N/A 1/17/2019    Procedure: Flexible And Rigid Bronchoscopy, Balloon Dilation.;  Surgeon: Wilber Lin MD;  Location: [facility]     BRONCHOSCOPY, DILATE BRONCHUS, STENT BRONCHUS, COMBINED N/A 3/7/2019    Procedure: Flexible and Rigid Bronchoscopy, Bronchial Washing, Balloon Dilation;  Surgeon: Wilber Lin MD;  Location: [facility]     BRONCHOSCOPY, DILATE BRONCHUS, STENT BRONCHUS, COMBINED N/A 6/6/2019    Procedure: Rigid and Flexible Bronchoscopy, Balloon Dilation;  Surgeon: Wilber Lin MD;  Location: [facility]     BRONCHOSCOPY, DILATE BRONCHUS, STENT BRONCHUS, COMBINED N/A 10/11/2019    Procedure: Flexible and Rigid Bronchoscopy, Balloon Dilation, Bronchoalveolar Lagave;  Surgeon: Wilber Lin MD;  Location: [facility]     ENT SURGERY      tonsillectomy as a child     ESOPHAGOSCOPY, GASTROSCOPY, DUODENOSCOPY (EGD), COMBINED N/A 10/29/2018    Procedure: COMBINED ESOPHAGOSCOPY, GASTROSCOPY, DUODENOSCOPY (EGD) with biopsies and polypectomy;  Surgeon: Chente Bloom MD;  Location: [facility]     INSERT EXTRACORPORAL MEMBRANE OXYGENATOR ADULT (OUTSIDE OR) N/A 2/27/2018    Procedure: INSERT EXTRACORPORAL MEMBRANE OXYGENATOR ADULT (OUTSIDE OR);  INSERT EXTRACORPORAL MEMBRANE OXYGENATOR ADULT (OUTSIDE OR) ;  Surgeon: Toby Hernandez MD;  Location: [facility]     IR CVC TUNNEL PLACEMENT > 5 YRS OF AGE  10/25/2019     IR PARACENTESIS  1/8/2020     IR THORACENTESIS  9/13/2019     IR TRANSCATHETER BIOPSY  1/8/2020     no prior surgery       REMOVE EXTRACORPORAL MEMBRANE OXYGENATOR ADULT N/A 3/3/2018    Procedure: REMOVE EXTRACORPORAL MEMBRANE OXYGENATOR ADULT;  Removal of Right Femoral Venous and Right Axillary Arterial Extracorporeal Membrane Oxygenator;  Surgeon: Toby Hernandez MD;  Location: [facility]     TRANSPLANT LUNG RECIPIENT  "SINGLE X2 Bilateral 3/1/2018    Procedure: TRANSPLANT LUNG RECIPIENT SINGLE X2;  Median Sternotomy, Extracorporeal Membrane Oxygenator, bilateral sequential lung transplant;  Surgeon: Toby Hernandez MD;  Location:  OR           Family History:     Family History   Problem Relation Age of Onset     Hypertension Mother      Arthritis Mother      Pancreatic Cancer Father      Diabetes Father                            Service Date: 02/19/2020      INTERVAL HISTORY:  The patient was seen in clinic today for a followup visit.  She was accompanied by her , Ranjan.  The patient reports that her breathing and exercise tolerance have remained about the same since her visit with me in 2019.  However, she had an increase from her baseline cough about 2-3 weeks ago, which was accompanied by upper respiratory symptoms.  She is producing mucoid sputum which is not frankly purulent.  She is not having hemoptysis.  She has not been having fever, chills or other systemic symptoms.      REVIEW OF SYSTEMS:   1.  She continues to require intermittent paracenteses for ascites; the most recent one was done yesterday and was the first one she had had in about a month.   2.  She reports that dialysis is \"going fine.\"  She is producing urine which seems to be gradually increasing in quantity.   3.  Complete review of systems was asked and was otherwise negative.      ASSESSMENT AND PLAN:   1.  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  She is currently on 2 drug immune suppression (tacrolimus and prednisone) because of ***   2.  ID prophylaxis includes dapsone for PJP which was started during her 10/2019 hospitalization (G6PD level is normal).   3.  Productive cough with accompanying upper respiratory symptoms.  In view of her CT findings today that include increased right lower lobe and left lower lobe tree-in-bud and " ground-glass opacities, I am concluding that there is a lower respiratory tract infection which is most likely bacterial.  Nasal swab for respiratory viral panel was obtained today.  The patient was given a cup and will bring a sputum sample in tomorrow for bacterial and fungal cultures.  Regarding fungal infection, she is already being treated with voriconazole for previous Aspergillus empyema.  Today's CT scan does not show an increase in the left pleural effusion, which seems to make it less likely that the parenchymal problem is fungal.  I began antibacterial treatment with Augmentin (renally adjusted) and azithromycin for a planned 2-week course at which time she will be reassessed with CT scan, PFTs and exam.   4.  Pulmonary hypertension.  She was seen by Dr. Schaffer in Pulmonary Hypertension Clinic this morning in anticipation of a planned right heart catheterization tomorrow.  However, we have canceled that procedure in view of her active pulmonary process and have tentatively rescheduled it for 2 weeks from tomorrow.   5.  Ascites and portal hypertension, thought to be related to chronic right-sided heart pressures.  Please refer to Hepatology and Cardiology notes from today for details.   6.  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  She will continue voriconazole for an undefined period of time.  Again, CT scan today did not appear to show an increase in left pleural effusion which is encouraging.   7.  History of CMV viremia.  Her most recent CMV PCR was on 02/11/2020 and was below the detectable level in their lab of 35 copies per mL.  Today's PCR is pending.   8.  History of EBV viremia, with today's result in process.   9.  Dialysis-dependent renal failure.  The patient reports that her dialysis team is encouraged by the fact that her urine output appears to be increasing.   10.  History of hypertension, being treated with amlodipine, carvedilol and Imdur.   11.   Health care maintenance.  Vaccinations were not administered today.      The patient will return to see Dr. Christensen in 2 weeks with a repeat CT scan at that time.         LAURA DUDLEY MD             D: 2020   T: 2020   MT: al      Name:     SARAI KILLIAN   MRN:      [id number]        Account:      [id number]   :      1960           Service Date: 2020      Document: [id number]

## 2020-02-19 NOTE — NURSING NOTE
Chief Complaint   Patient presents with     New Patient     NEW PH     Vitals were taken and medications were reconciled.     Cecille Reed RMA  10:25 AM

## 2020-02-19 NOTE — LETTER
2/19/2020      RE: Kecia Blue  [address] [place] [address]       [facility]    Hepatology follow-up    CHIEF COMPLAINT AND REASON FOR VISIT:  New onset ascites.      SUBJECTIVE:  Ms. Blue is a 59-year-old white female with medical history significant for hypertension, chronic kidney disease, interstitial lung disease with antisynthetase syndrome who received a bilateral lung transplant in 03/2018.  She had multiple complications at that time, which included Aspergillus empema.  She is on voriconazole.        She was seen previously with the fellow, Vale Mas MD for that, and today this is her followup.        During that visit, she was ordered workup for portal hypertension, and in fact, she did get a transjugular liver biopsy with pressure measurements.        Symptom-wise, she continues to have the ascites.  Her ascites had a SAAG greater than 1.1, so this was compatible with portal hypertension.  Her liver biopsy did show congestive hepatopathy with moderate  pericellular fibrosis.  There was no bridging fibrosis.  This was resulted by Dr. Fernandez Blanco.        Otherwise, today she has cough but no fever or chills.  She does not have any signs of hepatic encephalopathy nor does she have any signs of gastrointestinal bleeding including melena, hematemesis or hematochezia. Patient denies jaundice.Has abdominal distension and had paracentesis yesterday.    Please note that she had an upper endoscopy done on 10/29/2018, where she did not have esophageal varices.  As far as other symptoms are concerned, she has some abdominal discomfort with the ascites, but does not have any tenderness.  She is having regular bowel movements.     Medical hx Surgical hx   Past Medical History:   Diagnosis Date     Antisynthetase syndrome (H) 2014     Chronic cough      Chronic infection     recent C diff  8/18     Dehydration 8/1/2018     Dermatomyositis (H)       Dysplasia of cervix, low grade (ESTRADA 1)      ILD (interstitial lung disease) (H)      Osteopenia      PONV (postoperative nausea and vomiting)      Pulmonary hypertension (H)      Raynaud's disease      Seronegative rheumatoid arthritis (H)       Past Surgical History:   Procedure Laterality Date     BRONCHOSCOPY (RIGID OR FLEXIBLE), DIAGNOSTIC N/A 4/10/2018    Procedure: COMBINED BRONCHOSCOPY (RIGID OR FLEXIBLE), LAVAGE;;  Surgeon: Mariposa Donohue MD;  Location:  GI     BRONCHOSCOPY (RIGID OR FLEXIBLE), DILATE BRONCHUS / TRACHEA N/A 10/11/2018    Procedure: BRONCHOSCOPY (RIGID OR FLEXIBLE), DILATE BRONCHUS / TRACHEA;  Flexible And Rigid Bronchoscopy and Dilation;  Surgeon: Wilber Lin MD;  [facility]     BRONCHOSCOPY FLEXIBLE N/A 3/13/2018    Procedure: BRONCHOSCOPY FLEXIBLE;  Flexible Bronchoscopy ;  Surgeon: Gissell Sanchez MD;  Location: [facility]     BRONCHOSCOPY FLEXIBLE N/A 5/9/2018    Procedure: BRONCHOSCOPY FLEXIBLE;;  Surgeon: Wilber Lin MD;  Location:  GI     BRONCHOSCOPY FLEXIBLE AND RIGID N/A 9/10/2018    Procedure: BRONCHOSCOPY FLEXIBLE AND RIGID;  Flexible and Rigid Bronchoscopy with Balloon Dilation, tissue debulking with cryo, and Right mainstem bronchus stent placement;  Surgeon: Wilber Lin MD;  [facility]     BRONCHOSCOPY RIGID N/A 6/6/2018    Procedure: BRONCHOSCOPY RIGID;;  Surgeon: Lopez Macias MD;  Location:  GI     BRONCHOSCOPY, DILATE BRONCHUS, STENT BRONCHUS, COMBINED N/A 6/11/2018    Procedure: COMBINED BRONCHOSCOPY, DILATE BRONCHUS, STENT BRONCHUS;  Flexible Bronchoscopy, Balloon Dilation, Bronchial Washings;  Surgeon: Wilber Lin MD;  Location:  OR     BRONCHOSCOPY, DILATE BRONCHUS, STENT BRONCHUS, COMBINED Right 7/10/2018    Procedure: COMBINED BRONCHOSCOPY, DILATE BRONCHUS, STENT BRONCHUS;  Flexible Bronchoscopy, Balloon Dilation, Bronchial Washings  ;  Surgeon: Wilber Lin MD;  Location:   OR     BRONCHOSCOPY, DILATE BRONCHUS, STENT BRONCHUS, COMBINED N/A 8/2/2018    Procedure: COMBINED BRONCHOSCOPY, DILATE BRONCHUS, STENT BRONCHUS;  Flexible Bronchoscopy, Bronchial Washings, Balloon Dilation;  Surgeon: Wilber Lin MD;  [facility]     BRONCHOSCOPY, DILATE BRONCHUS, STENT BRONCHUS, COMBINED N/A 8/20/2018    Procedure: COMBINED BRONCHOSCOPY, DILATE BRONCHUS, STENT BRONCHUS;  Flexible Bronchoscopy, Balloon Dilation;  Surgeon: Wilber Lin MD;  [facility]     BRONCHOSCOPY, DILATE BRONCHUS, STENT BRONCHUS, COMBINED N/A 10/29/2018    Procedure: Flexible Bronchoscopy, Balloon Dilation, Stent Revision, Airway Examination And Therapeutic Suctioning, Cyro Tumor Debulking;  Surgeon: Wilber Lin MD;  [facility]     BRONCHOSCOPY, DILATE BRONCHUS, STENT BRONCHUS, COMBINED N/A 11/20/2018    Procedure: Rigid Bronchoscopy, Stent Removal and dilitation;  Surgeon: Wilber Lin MD;  [facility]     BRONCHOSCOPY, DILATE BRONCHUS, STENT BRONCHUS, COMBINED N/A 12/14/2018    Procedure: Flexible And Rigid Bronchoscopy, Balloon Dilation, Bronchial Washing;  Surgeon: Wilber Lin MD;  [facility]     BRONCHOSCOPY, DILATE BRONCHUS, STENT BRONCHUS, COMBINED N/A 1/17/2019    Procedure: Flexible And Rigid Bronchoscopy, Balloon Dilation.;  Surgeon: Wilber Lin MD;  [facility]     BRONCHOSCOPY, DILATE BRONCHUS, STENT BRONCHUS, COMBINED N/A 3/7/2019    Procedure: Flexible and Rigid Bronchoscopy, Bronchial Washing, Balloon Dilation;  Surgeon: Wilber Lin MD;  [facility]     BRONCHOSCOPY, DILATE BRONCHUS, STENT BRONCHUS, COMBINED N/A 6/6/2019    Procedure: Rigid and Flexible Bronchoscopy, Balloon Dilation;  Surgeon: Wilber Lin MD;  [facility]     BRONCHOSCOPY, DILATE BRONCHUS, STENT BRONCHUS, COMBINED N/A 10/11/2019    Procedure: Flexible and Rigid Bronchoscopy, Balloon Dilation, Bronchoalveolar Lagave;  Surgeon:  Wilber Lin MD;  [facility]     ENT SURGERY      tonsillectomy as a child     ESOPHAGOSCOPY, GASTROSCOPY, DUODENOSCOPY (EGD), COMBINED N/A 10/29/2018    Procedure: COMBINED ESOPHAGOSCOPY, GASTROSCOPY, DUODENOSCOPY (EGD) with biopsies and polypectomy;  Surgeon: Chente Bloom MD;  [facility]     INSERT EXTRACORPORAL MEMBRANE OXYGENATOR ADULT (OUTSIDE OR) N/A 2/27/2018    Procedure: INSERT EXTRACORPORAL MEMBRANE OXYGENATOR ADULT (OUTSIDE OR);  INSERT EXTRACORPORAL MEMBRANE OXYGENATOR ADULT (OUTSIDE OR) ;  Surgeon: Toby Hernandez MD;  [facility]     IR CVC TUNNEL PLACEMENT > 5 YRS OF AGE  10/25/2019     IR PARACENTESIS  1/8/2020     IR THORACENTESIS  9/13/2019     IR TRANSCATHETER BIOPSY  1/8/2020     no prior surgery       REMOVE EXTRACORPORAL MEMBRANE OXYGENATOR ADULT N/A 3/3/2018    Procedure: REMOVE EXTRACORPORAL MEMBRANE OXYGENATOR ADULT;  Removal of Right Femoral Venous and Right Axillary Arterial Extracorporeal Membrane Oxygenator;  Surgeon: Toby Hernandez MD;  [facility]     TRANSPLANT LUNG RECIPIENT SINGLE X2 Bilateral 3/1/2018    Procedure: TRANSPLANT LUNG RECIPIENT SINGLE X2;  Median Sternotomy, Extracorporeal Membrane Oxygenator, bilateral sequential lung transplant;  Surgeon: Toby Hernandez MD;  [facility]          Medications  Prior to Admission medications    Medication Sig Start Date End Date Taking? Authorizing Provider   calcium-vitamin D (CALTRATE) 600-400 MG-UNIT per tablet Take 1 tablet by mouth daily 8/1/18  Yes Ame Chow PA-C   carvedilol (COREG) 25 MG tablet Take 1 tablet (25 mg) by mouth 2 times daily (with meals) 12/18/19  Yes Srinivas Mcelroy MD   dapsone (ACZONE) 100 MG tablet Take 1 tablet (100 mg) by mouth daily 12/3/19  Yes Ame Chow PA-C   ferrous sulfate (FEROSUL) 325 (65 Fe) MG tablet Take 1 tablet (325 mg) by mouth daily (with breakfast) 9/15/19  Yes Christelle Lopez,  "MD   magnesium oxide (MAG-OX) 400 MG tablet Take 400 mg by mouth daily    Yes Unknown, Entered By History   multivitamin, therapeutic with minerals (THERA-VIT-M) TABS tablet Take 1 tablet by mouth daily 5/24/18  Yes Alex Hale MD   ondansetron (ZOFRAN) 4 MG tablet Take 1 tablet (4 mg) by mouth every 12 hours as needed for nausea 12/3/19  Yes Ame Chow PA-C   pantoprazole (PROTONIX) 40 MG EC tablet Take 1 tablet (40 mg) by mouth 2 times daily 6/24/19  Yes Ame Chow PA-C   predniSONE 2.5 MG PO tablet Take two tablets (5mg) every AM and one tablet (2.5mg) every evening 2/19/20  Yes Srinivas Mcelroy MD   senna-docusate (SENOKOT-S/PERICOLACE) 8.6-50 MG tablet Take 2 tablets by mouth 2 times daily as needed for constipation 9/15/19  Yes Christelle Lopez MD   tacrolimus (GENERIC EQUIVALENT) 0.5 MG capsule Take 1 capsule (0.5 mg) by mouth every evening Total dose: 1 mg in the AM and 0.5 mg in the PM 12/30/19  Yes Ame Chow PA-C   tacrolimus (GENERIC EQUIVALENT) 1 MG capsule Take 1 capsule (1 mg) by mouth every morning Total dose: 1 mg in the AM and 0.5 mg in the PM 12/30/19  Yes Ame Chow PA-C   voriconazole (VFEND) 200 MG tablet Take 1 tablet (200 mg) by mouth 2 times daily TOTAL DOSE: 250 mg every 12 hours 11/18/19  Yes Dean Riley MD   voriconazole (VFEND) 50 MG tablet Take 1 tablet (50 mg) by mouth 2 times daily TOTAL DOSE: 250 mg every 12 hours 11/18/19  Yes Dean Riley MD   amoxicillin-clavulanate 500-125 MG PO tablet Take 1 tablet by mouth daily 2/19/20   Srinivas Mcelroy MD   azithromycin 250 MG PO tablet Take 1 tablet (250 mg) by mouth daily 2/19/20   Srinivas Mcelroy MD       Allergies  No Known Allergies    Review of systems  A 10-point review of systems was negative    Examination  /70   Pulse 86   Temp 97.7  F (36.5  C) (Oral)   Ht 1.626 m (5' 4\")   Wt 54 kg (119 lb)   LMP 06/07/2014 (Exact Date)   SpO2 95%   " BMI 20.43 kg/m     Body mass index is 20.43 kg/m .    Gen- well, NAD, A+Ox3, normal color  Lym- no palpable LAD  CVS- RRR  RS- CTA  Abd- Distended. Positive shifting dullness.  Extr- hands normal, no DENIS  Skin- no rash or jaundice  Neuro- no asterixis  Psych- normal mood    Laboratory  Lab Results   Component Value Date     02/19/2020    POTASSIUM 4.1 02/19/2020    CHLORIDE 104 02/19/2020    CO2 32 02/19/2020    BUN 26 02/19/2020    CR 2.45 02/19/2020       Lab Results   Component Value Date    BILITOTAL 0.6 02/19/2020    ALT 30 02/19/2020    AST 30 02/19/2020    ALKPHOS 498 02/19/2020       Lab Results   Component Value Date    ALBUMIN 3.3 02/19/2020    PROTTOTAL 7.3 02/19/2020        Lab Results   Component Value Date    WBC 6.7 02/19/2020    HGB 11.8 02/19/2020     02/19/2020     02/19/2020       Lab Results   Component Value Date    INR 1.03 02/19/2020     MELD-Na score: 15 at 2/19/2020  7:13 AM  MELD score: 15 at 2/19/2020  7:13 AM  Calculated from:  Serum Creatinine: 2.45 mg/dL at 2/19/2020  7:13 AM  Serum Sodium: 140 mmol/L (Rounded to 137 mmol/L) at 2/19/2020  7:13 AM  Total Bilirubin: 0.6 mg/dL (Rounded to 1 mg/dL) at 2/19/2020  7:13 AM  INR(ratio): 1.03 at 2/19/2020  7:13 AM  Age: 59 years    Radiology    ASSESSMENT AND PLAN:  Portal hypertension with new onset ascites.  Her portal hypertension was investigated.  In fact, she had portal pressure measurements done by Dr. Fields from IR and he measured that her wedge pressure was 21 mmHg and her free hepatic vein pressures were 7.  This was significant and the transjugular liver biopsy showed congestive hepatopathy, which goes for her previous history of pulmonary hypertension.  She will see Dr. Schaffer regarding that and we await his input.  She was also seen by her lung transplant doctor, Dr. Srinivas Mcelroy, on the same day.  She will also have further workup by Pulmonary.  Regardless, optimizing her heart might improve her ascites.   Otherwise, she will have as-needed paracentesis and we will see her here in 6 months.        PLAN:  We await Dr. Schaffer's input, including a right heart catheterization that was to be scheduled.      This was a 40-minute visit of which more than 50% was spent in explaining to the patient our plan of care.  We answered all of her questions and she will be seen here in 6 months.       Feng Denise MD  Hepatology  [facility]    Feng Denise MD

## 2020-02-19 NOTE — PROGRESS NOTES
"Service Date: 02/19/2020      INTERVAL HISTORY:  The patient was seen in clinic today for a followup visit.  She was accompanied by her , Ranjan.  The patient reports that her breathing and exercise tolerance have remained about the same since her visit with me in 2019.  However, she had an increase from her baseline cough about 2-3 weeks ago, which was accompanied by upper respiratory symptoms.  She is producing mucoid sputum which is not frankly purulent.  She is not having hemoptysis.  She has not been having fever, chills or other systemic symptoms.      REVIEW OF SYSTEMS:   1.  She continues to require intermittent paracenteses for ascites; the most recent one was done yesterday and was the first one she had had in about a month.   2.  She reports that dialysis is \"going fine.\"  She is producing urine which seems to be gradually increasing in quantity.   3.  Complete review of systems was asked and was otherwise negative.      ASSESSMENT AND PLAN:   1.  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  She is currently on 2 drug immune suppression (tacrolimus and prednisone) because o   2.  ID prophylaxis includes dapsone for PJP which was started during her 10/2019 hospitalization (G6PD level is normal).   3.  Productive cough with accompanying upper respiratory symptoms.  In view of her CT findings today that include increased right lower lobe and left lower lobe tree-in-bud and ground-glass opacities, I am concluding that there is a lower respiratory tract infection which is most likely bacterial.  Nasal swab for respiratory viral panel was obtained today.  The patient was given a cup and will bring a sputum sample in tomorrow for bacterial and fungal cultures.  Regarding fungal infection, she is already being treated with voriconazole for previous Aspergillus empyema.  Today's CT scan does not show an " increase in the left pleural effusion, which seems to make it less likely that the parenchymal problem is fungal.  I began antibacterial treatment with Augmentin (renally adjusted) and azithromycin for a planned 2-week course at which time she will be reassessed with CT scan, PFTs and exam.   4.  Pulmonary hypertension.  She was seen by Dr. Schaffer in Pulmonary Hypertension Clinic this morning in anticipation of a planned right heart catheterization tomorrow.  However, we have canceled that procedure in view of her active pulmonary process and have tentatively rescheduled it for 2 weeks from tomorrow.   5.  Ascites and portal hypertension, thought to be related to chronic right-sided heart pressures.  Please refer to Hepatology and Cardiology notes from today for details.   6.  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  She will continue voriconazole for an undefined period of time.  Again, CT scan today did not appear to show an increase in left pleural effusion which is encouraging.   7.  History of CMV viremia.  Her most recent CMV PCR was on 2020 and was below the detectable level in their lab of 35 copies per mL.  Today's PCR is pending.   8.  History of EBV viremia, with today's result in process.   9.  Dialysis-dependent renal failure.  The patient reports that her dialysis team is encouraged by the fact that her urine output appears to be increasing.   10.  History of hypertension, being treated with amlodipine, carvedilol and Imdur.   11.  Health care maintenance.  Vaccinations were not administered today.      The patient will return to see Dr. Christensen in 2 weeks with a repeat CT scan at that time.         LAURA DUDLEY MD             D: 2020   T: 2020   MT: al      Name:     SARAI KILLIAN   MRN:      [id number]        Account:      [id number]   :      1960           Service Date: 2020      Document: [id number]

## 2020-02-20 DIAGNOSIS — Z94.2 LUNG REPLACED BY TRANSPLANT (H): ICD-10-CM

## 2020-02-20 LAB
BACTERIA SPEC CULT: ABNORMAL
DONOR IDENTIFICATION: NORMAL
DSA COMMENTS: NORMAL
DSA PRESENT: NO
DSA TEST METHOD: NORMAL
EBV DNA # SPEC NAA+PROBE: ABNORMAL {COPIES}/ML
EBV DNA SPEC NAA+PROBE-LOG#: 4.6 {LOG_COPIES}/ML
FUNGUS SPEC CULT: ABNORMAL
GRAM STN SPEC: ABNORMAL
Lab: ABNORMAL
ORGAN: NORMAL
SA1 CELL: NORMAL
SA1 COMMENTS: NORMAL
SA1 HI RISK ABY: NORMAL
SA1 MOD RISK ABY: NORMAL
SA1 TEST METHOD: NORMAL
SA2 CELL: NORMAL
SA2 COMMENTS: NORMAL
SA2 HI RISK ABY UA: NORMAL
SA2 MOD RISK ABY: NORMAL
SA2 TEST METHOD: NORMAL
SPECIMEN SOURCE: ABNORMAL
TESTOST SERPL-MCNC: 13 NG/DL (ref 8–60)
UNACCEPTABLE ANTIGEN: NORMAL
UNOS CPRA: 0

## 2020-02-20 NOTE — PROGRESS NOTES
Called Kecia to ask her to resubmit new sputum for cultures.  Orders sent to [facility].

## 2020-02-20 NOTE — LETTER
PHYSICIAN ORDERS      DATE & TIME ISSUED: 2020 1:59 PM  PATIENT NAME: Kecia Blue   : 1960     [facility] MR# [if applicable]: [id number]     DIAGNOSIS:  Lung Transplant  Z94.2, cough  Collect sputum for routine, fungal, and AFB cultures.  Fax results to number below    Any questions please call: Magaly [phone]    Please fax these results to [phone].      .

## 2020-02-20 NOTE — LETTER
PHYSICIAN ORDERS      DATE & TIME ISSUED: 2020 1:59 PM  PATIENT NAME: Kecia Blue   : 1960     [facility] MR# [if applicable]: [id number]     DIAGNOSIS:  Lung Transplant  Z94.2, cough  Collect sputum for routine, fungal, and AFB cultures.  Fax results to number below    Any questions please call: Magaly [phone]    Please fax these results to [phone].      .

## 2020-02-21 ENCOUNTER — TELEPHONE (OUTPATIENT)
Dept: TRANSPLANT | Facility: CLINIC | Age: 60
End: 2020-02-21

## 2020-02-21 DIAGNOSIS — Z94.2 LUNG REPLACED BY TRANSPLANT (H): Primary | ICD-10-CM

## 2020-02-21 NOTE — LETTER
PHYSICIAN ORDERS      DATE & TIME ISSUED: 2020 3:10 PM  PATIENT NAME: Kecia Blue   : 1960     [facility] MR# [if applicable]: [id number]     DIAGNOSIS:  Lung Transplant  Z94.2    Sputum culture aerobic bacteria  Gram stain  Fungus culture non-blood- sputum    Any questions please call: Mikayla [phone]    Please fax these results to [phone].      .

## 2020-02-21 NOTE — TELEPHONE ENCOUNTER
LM for patient, plan for repeat non contrast chest CT before next visit per Dr. Mcelroy.   Order for sputum sample including fungal faxed to local lab as sample dropped off at [facility] on 2/20/2020 had oral contamination.

## 2020-02-25 ENCOUNTER — TELEPHONE (OUTPATIENT)
Dept: TRANSPLANT | Facility: CLINIC | Age: 60
End: 2020-02-25

## 2020-02-25 ENCOUNTER — TRANSFERRED RECORDS (OUTPATIENT)
Dept: HEALTH INFORMATION MANAGEMENT | Facility: CLINIC | Age: 60
End: 2020-02-25

## 2020-02-25 DIAGNOSIS — Z94.2 LUNG REPLACED BY TRANSPLANT (H): ICD-10-CM

## 2020-02-25 PROCEDURE — 80197 ASSAY OF TACROLIMUS: CPT | Performed by: INTERNAL MEDICINE

## 2020-02-25 NOTE — TELEPHONE ENCOUNTER
DATE:  2/25/2020   TIME OF RECEIPT FROM LAB:  11:32 am  LAB TEST:  ALK PHOS  LAB VALUE:  479  RESULTS GIVEN WITH READ-BACK TO (PROVIDER):  CAITIE Latham RN  TIME LAB VALUE REPORTED TO PROVIDER:   11:40 am

## 2020-02-26 LAB
TACROLIMUS BLD-MCNC: 5.4 UG/L (ref 5–15)
TME LAST DOSE: NORMAL H

## 2020-02-27 ENCOUNTER — TELEPHONE (OUTPATIENT)
Dept: TRANSPLANT | Facility: CLINIC | Age: 60
End: 2020-02-27

## 2020-02-27 NOTE — TELEPHONE ENCOUNTER
Tacrolimus level 5.4 at 12 hours, on 2/25/2020  Goal 8-10.   Current dose 1 mg in AM, 0.5 mg in PM    Dose changed to  1.5 mg in AM, 1 mg in PM   Recheck level in 5-7 days    Discussed with patient   MyChart message sent     Patient vori level this week <0.1. Patient says she is still on it and has not skipped any doses. Will check next week with next blood draw.

## 2020-02-28 NOTE — PROGRESS NOTES
Did not keep appointment  Answers for HPI/ROS submitted by the patient on 2/19/2020   General Symptoms: No  Skin Symptoms: No  HENT Symptoms: Yes  EYE SYMPTOMS: No  HEART SYMPTOMS: No  LUNG SYMPTOMS: Yes  INTESTINAL SYMPTOMS: No  URINARY SYMPTOMS: No  GYNECOLOGIC SYMPTOMS: No  BREAST SYMPTOMS: No  SKELETAL SYMPTOMS: No  BLOOD SYMPTOMS: No  NERVOUS SYSTEM SYMPTOMS: No  MENTAL HEALTH SYMPTOMS: No  Ear pain: No  Ear discharge: No  Tooth pain: No  Gum tenderness: No  Bleeding gums: No  Change in taste: No  Change in sense of smell: No  Dry mouth: No  Hearing aid used: No  Neck lump: No  Cough: Yes  Sputum or phlegm: Yes  Coughing up blood: No  Difficulty breating or shortness of breath: No  Snoring: No  Wheezing: Yes  Difficulty breathing on exertion: No  Nighttime Cough: Yes  Difficulty breathing when lying flat: No

## 2020-03-03 ENCOUNTER — TELEPHONE (OUTPATIENT)
Dept: TRANSPLANT | Facility: CLINIC | Age: 60
End: 2020-03-03

## 2020-03-03 DIAGNOSIS — Z94.2 LUNG REPLACED BY TRANSPLANT (H): ICD-10-CM

## 2020-03-03 PROCEDURE — 80197 ASSAY OF TACROLIMUS: CPT | Performed by: INTERNAL MEDICINE

## 2020-03-03 NOTE — TELEPHONE ENCOUNTER
Noted, lab stable. Spoke with lab, they will start calling critical only if alk phos >500 given it's been in the 400s for many weeks.

## 2020-03-03 NOTE — TELEPHONE ENCOUNTER
"DATE:  3/3/2020   TIME OF RECEIPT FROM LAB:  8053  LAB TEST:  Alk Phos  LAB VALUE:  432  RESULTS GIVEN WITH READ-BACK TO (PROVIDER):   Sent to Mikayla Latham, given verbally to Olinda Francois  TIME LAB VALUE REPORTED TO PROVIDER:   0926    [facility]'s policy is they do not call to notify if same lab result is  critical > 3 weeks. \"Do we need to keep calling\"   Requesting call back from Coordinator   "

## 2020-03-04 LAB
TACROLIMUS BLD-MCNC: 8.8 UG/L (ref 5–15)
TME LAST DOSE: NORMAL H

## 2020-03-08 NOTE — PROGRESS NOTES
Reason for Visit  Kecia Blue is a 57-year-old female who is being seen for RECHECK (lung TX)    Lung TX HPI  Kecia Blue is a 57-year-old female with a history of dermatomyositis, seronegative rheumatoid arthritis, interstitial lung disease, and pulmonary hypertension who was admitted to the hospital with acute worsening of chronic hypoxic respiratory failure in 02/2018 and progressed to [facility] for right heart failure and subsequently underwent bilateral sequential lung transplant on 03/01/2018.     She has had significant narrowing of right bronchial anastomosis with significant granulation tissue blocking the mainstem.  She has required multiple dilations and stent placement.     She was admitted in 10/2019 with increasing chest pain and cough. 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 She then had RADHA on CKD leading to iHD since 10/2019. She also has required paracentesis (intermittently) since 11/2019.    She was last seen on 2/9/2020 and was treated with Augmentin for bronchitis. She was scheduled for Rt. HC to assess pulm hypertension.       Interval HPI     Since her last clinic visit she has been doing well.  She denies any cough shortness of breath chest pains or wheezing.  She has been sleeping well with no issues with her pulmonary symptoms.  She still has leg swelling and.  She is doing dialysis 3 times a week.    Her last paracentesis was February 18, 2020.  She feels like the fluid is slowly building back up again.      Current Outpatient Medications   Medication     amoxicillin-clavulanate 500-125 MG PO tablet     azithromycin 250 MG PO tablet     calcium-vitamin D (CALTRATE) 600-400 MG-UNIT per tablet     carvedilol (COREG) 25 MG tablet     dapsone (ACZONE) 100 MG tablet     ferrous sulfate (FEROSUL) 325 (65 Fe) MG tablet     magnesium oxide (MAG-OX) 400 MG tablet     multivitamin, therapeutic with minerals (THERA-VIT-M) TABS tablet     ondansetron  (ZOFRAN) 4 MG tablet     pantoprazole (PROTONIX) 40 MG EC tablet     predniSONE 2.5 MG PO tablet     senna-docusate (SENOKOT-S/PERICOLACE) 8.6-50 MG tablet     tacrolimus (GENERIC EQUIVALENT) 0.5 MG capsule     tacrolimus (GENERIC EQUIVALENT) 1 MG capsule     voriconazole (VFEND) 200 MG tablet     voriconazole (VFEND) 50 MG tablet     No current facility-administered medications for this visit.        Not on File      Past Medical History:   Diagnosis Date     Antisynthetase syndrome (H) 2014     Chronic cough      Chronic infection     recent C diff  8/18     Dehydration 8/1/2018     Dermatomyositis (H)      Dysplasia of cervix, low grade (ESTRADA 1)      ILD (interstitial lung disease) (H)      Osteopenia      PONV (postoperative nausea and vomiting)      Pulmonary hypertension (H)      Raynaud's disease      Seronegative rheumatoid arthritis (H)        Past Surgical History:   Procedure Laterality Date     BRONCHOSCOPY (RIGID OR FLEXIBLE), DIAGNOSTIC N/A 4/10/2018    Procedure: COMBINED BRONCHOSCOPY (RIGID OR FLEXIBLE), LAVAGE;;  Surgeon: Mariposa Donohue MD;  Location: [facility]     BRONCHOSCOPY (RIGID OR FLEXIBLE), DILATE BRONCHUS / TRACHEA N/A 10/11/2018    Procedure: BRONCHOSCOPY (RIGID OR FLEXIBLE), DILATE BRONCHUS / TRACHEA;  Flexible And Rigid Bronchoscopy and Dilation;  Surgeon: Wilber Lin MD;  [facility]     BRONCHOSCOPY FLEXIBLE N/A 3/13/2018    Procedure: BRONCHOSCOPY FLEXIBLE;  Flexible Bronchoscopy ;  Surgeon: Gissell Sanchez MD;  Location: [facility]     BRONCHOSCOPY FLEXIBLE N/A 5/9/2018    Procedure: BRONCHOSCOPY FLEXIBLE;;  Surgeon: Wilber Lin MD;  Location: [facility]     BRONCHOSCOPY FLEXIBLE AND RIGID N/A 9/10/2018    Procedure: BRONCHOSCOPY FLEXIBLE AND RIGID;  Flexible and Rigid Bronchoscopy with Balloon Dilation, tissue debulking with cryo, and Right mainstem bronchus stent placement;  Surgeon: Wilber Lin MD;  [facility]     BRONCHOSCOPY RIGID N/A  6/6/2018    Procedure: BRONCHOSCOPY RIGID;;  Surgeon: Lopez Macias MD;  Location: [facility]     BRONCHOSCOPY, DILATE BRONCHUS, STENT BRONCHUS, COMBINED N/A 6/11/2018    Procedure: COMBINED BRONCHOSCOPY, DILATE BRONCHUS, STENT BRONCHUS;  Flexible Bronchoscopy, Balloon Dilation, Bronchial Washings;  Surgeon: Wilber Lin MD;  [facility]     BRONCHOSCOPY, DILATE BRONCHUS, STENT BRONCHUS, COMBINED Right 7/10/2018    Procedure: COMBINED BRONCHOSCOPY, DILATE BRONCHUS, STENT BRONCHUS;  Flexible Bronchoscopy, Balloon Dilation, Bronchial Washings  ;  Surgeon: Wilber Lin MD;  [facility]     BRONCHOSCOPY, DILATE BRONCHUS, STENT BRONCHUS, COMBINED N/A 8/2/2018    Procedure: COMBINED BRONCHOSCOPY, DILATE BRONCHUS, STENT BRONCHUS;  Flexible Bronchoscopy, Bronchial Washings, Balloon Dilation;  Surgeon: Wilber Lin MD;  [facility]     BRONCHOSCOPY, DILATE BRONCHUS, STENT BRONCHUS, COMBINED N/A 8/20/2018    Procedure: COMBINED BRONCHOSCOPY, DILATE BRONCHUS, STENT BRONCHUS;  Flexible Bronchoscopy, Balloon Dilation;  Surgeon: Wilber Lin MD;  [facility]     BRONCHOSCOPY, DILATE BRONCHUS, STENT BRONCHUS, COMBINED N/A 10/29/2018    Procedure: Flexible Bronchoscopy, Balloon Dilation, Stent Revision, Airway Examination And Therapeutic Suctioning, Cyro Tumor Debulking;  Surgeon: Wilber Lin MD;  [facility]     BRONCHOSCOPY, DILATE BRONCHUS, STENT BRONCHUS, COMBINED N/A 11/20/2018    Procedure: Rigid Bronchoscopy, Stent Removal and dilitation;  Surgeon: Wilber Lin MD;  [facility]     BRONCHOSCOPY, DILATE BRONCHUS, STENT BRONCHUS, COMBINED N/A 12/14/2018    Procedure: Flexible And Rigid Bronchoscopy, Balloon Dilation, Bronchial Washing;  Surgeon: Wilber Lin MD;  [facility]     BRONCHOSCOPY, DILATE BRONCHUS, STENT BRONCHUS, COMBINED N/A 1/17/2019    Procedure: Flexible And Rigid Bronchoscopy, Balloon Dilation.;  Surgeon:  Wilber Lin MD;  [facility]     BRONCHOSCOPY, DILATE BRONCHUS, STENT BRONCHUS, COMBINED N/A 3/7/2019    Procedure: Flexible and Rigid Bronchoscopy, Bronchial Washing, Balloon Dilation;  Surgeon: Wilber Lin MD;  [facility]     BRONCHOSCOPY, DILATE BRONCHUS, STENT BRONCHUS, COMBINED N/A 6/6/2019    Procedure: Rigid and Flexible Bronchoscopy, Balloon Dilation;  Surgeon: Wilber Lin MD;  [facility]     BRONCHOSCOPY, DILATE BRONCHUS, STENT BRONCHUS, COMBINED N/A 10/11/2019    Procedure: Flexible and Rigid Bronchoscopy, Balloon Dilation, Bronchoalveolar Lagave;  Surgeon: Wilber Lin MD;  [facility]     ENT SURGERY      tonsillectomy as a child     ESOPHAGOSCOPY, GASTROSCOPY, DUODENOSCOPY (EGD), COMBINED N/A 10/29/2018    Procedure: COMBINED ESOPHAGOSCOPY, GASTROSCOPY, DUODENOSCOPY (EGD) with biopsies and polypectomy;  Surgeon: Chente Bloom MD;  [facility]     INSERT EXTRACORPORAL MEMBRANE OXYGENATOR ADULT (OUTSIDE OR) N/A 2/27/2018    Procedure: INSERT EXTRACORPORAL MEMBRANE OXYGENATOR ADULT (OUTSIDE OR);  INSERT EXTRACORPORAL MEMBRANE OXYGENATOR ADULT (OUTSIDE OR) ;  Surgeon: Toby Hernandez MD;  [facility]     IR CVC TUNNEL PLACEMENT > 5 YRS OF AGE  10/25/2019     IR PARACENTESIS  1/8/2020     IR THORACENTESIS  9/13/2019     IR TRANSCATHETER BIOPSY  1/8/2020     no prior surgery       REMOVE EXTRACORPORAL MEMBRANE OXYGENATOR ADULT N/A 3/3/2018    Procedure: REMOVE EXTRACORPORAL MEMBRANE OXYGENATOR ADULT;  Removal of Right Femoral Venous and Right Axillary Arterial Extracorporeal Membrane Oxygenator;  Surgeon: Toby Hernandez MD;  [facility]     TRANSPLANT LUNG RECIPIENT SINGLE X2 Bilateral 3/1/2018    Procedure: TRANSPLANT LUNG RECIPIENT SINGLE X2;  Median Sternotomy, Extracorporeal Membrane Oxygenator, bilateral sequential lung transplant;  Surgeon: Toby Hernandez MD;  Location[facility]        Social History     Socioeconomic History     Marital status:      Spouse name: Not on file     Number of children: Not on file     Years of education: Not on file     Highest education level: Not on file   Occupational History     Not on file   Social Needs     Financial resource strain: Not on file     Food insecurity     Worry: Not on file     Inability: Not on file     Transportation needs     Medical: Not on file     Non-medical: Not on file   Tobacco Use     Smoking status: Never Smoker     Smokeless tobacco: Never Used   Substance and Sexual Activity     Alcohol use: No     Alcohol/week: 1.0 standard drinks     Types: 1 Glasses of wine per week     Drug use: No     Sexual activity: Not on file   Lifestyle     Physical activity     Days per week: Not on file     Minutes per session: Not on file     Stress: Not on file   Relationships     Social connections     Talks on phone: Not on file     Gets together: Not on file     Attends Religion service: Not on file     Active member of club or organization: Not on file     Attends meetings of clubs or organizations: Not on file     Relationship status: Not on file     Intimate partner violence     Fear of current or ex partner: Not on file     Emotionally abused: Not on file     Physically abused: Not on file     Forced sexual activity: Not on file   Other Topics Concern     Parent/sibling w/ CABG, MI or angioplasty before 65F 55M? No   Social History Narrative    3/6/2019 - Lives with . Has three daughters. Four grandchildren (two ). No pets. Travelled previously to [place], [place]. Has visited [place] several times.        Family History   Problem Relation Age of Onset     Hypertension Mother      Arthritis Mother      Pancreatic Cancer Father      Diabetes Father        Pulmonary ROS  Constitutional- Positive. Gained weight  Eyes- Negative  Ear, nose and throat- Positive. Rhinorrhea.  Cardiac- Negative  Pulm- See HPI  GI-  "Negative.  Genitourinary- Negative  Musculoskeletal- Negative.  Neurology- Negative  Dermatology- Negative  Endocrine- Negative  Lymphatic- Negative  Psychiatry- Negative  A complete ROS was otherwise negative except as noted in the HPI.      /85   Pulse 98   Temp 97.9  F (36.6  C) (Oral)   Ht 1.626 m (5' 4\")   Wt 54.9 kg (121 lb)   LMP 06/07/2014 (Exact Date)   SpO2 93%   BMI 20.77 kg/m    Physical Exam  GENERAL APPEARANCE: Alert, Oriented x3. Not in distress.  EYES: PERRL, EOMI  HENT: Nasal mucosa with no hyperemia and no edema, no nasal polyps.  MOUTH: Oral mucosa is moist, without any lesions, no tonsillar enlargement, no oropharyngeal exudate.  NECK: Supple, no masses, no thyromegaly.  LYMPHATICS: No significant axillary, cervical, or supraclavicular nodes.  RESP: Normal percussion, good air flow throughout Left lung except the base occ insp crackles.  CV: Normal S1, S2, regular rhythm, normal rate, no rub, no murmur,  no gallop. 2+ nikki LE edema.  ABDOMEN: Bowel sounds normal, soft, nontender, no HSM or masses.  MS: Extremities normal, no clubbing, no cyanosis.  SKIN: No rash on limited exam.  NEURO: Mentation intact, speech normal, normal strength and tone, normal gait, and stance.  PSYCH: Mentation appears normal, and affect normal/bright.      Results  Recent Results (from the past 168 hour(s))   Tacrolimus level    Collection Time: 03/03/20 11:50 AM   Result Value Ref Range    Tacrolimus Last Dose 2330 3/2/20     Tacrolimus Level 8.8 5.0 - 15.0 ug/L   Basic metabolic panel    Collection Time: 03/03/20 11:57 AM   Result Value Ref Range    Sodium (External) 139 136 - 145 mmol/L    Potassium (External) 4.7 3.5 - 5.1 mmol/L    Chloride (External) 100 98 - 107 mmol/L    CO2 (External) 24 20 - 31 mmol/L    Anion Gap (External) 19.7 mmol/L    Creatinine (External) 3.30 (H) 0.55 - 1.02 mg/dL    Urea Nitrogen (External) 36.5 (H) 7.0 - 26.0 mg/dL    BUN/Creatinine Ratio (External) 11.06 ratio    Calcium " (External) 8.9 8.4 - 10.2 mg/dL    Glucose (External) 93 70 - 105 mg/dL    GFR Estimated (External) 15 (L) >=60 mL/min/1.73m2   Magnesium    Collection Time: 03/03/20 11:57 AM   Result Value Ref Range    Magnesium (External) 1.8 1.6 - 2.6 mg/dL   Phosphorus    Collection Time: 03/03/20 11:57 AM   Result Value Ref Range    Phosphorus (External) 4.7 2.3 - 4.7 mg/dL   CBC with platelets differential    Collection Time: 03/03/20 11:57 AM   Result Value Ref Range    WBC Count (External) 7.9 4.5 - 11.0 10(3)/uL    RBC Count (External) 3.82 (L) 4.00 - 5.20 10(6)/uL    Hemoglobin (External) 12.0 12.0 - 16.0 g/dL    Hematocrit (External) 37.4 33.0 - 51.0 %    MCV (External) 97.9 80.0 - 100.0 fL    MCH (External) 31.4 26.0 - 34.0 Pg    MCHC (External) 32.1 32.0 - 36.0 g/dL    RDW (External) 15.8 (H) 11.5 - 13.1 %    Platelet Count (External) 152 150 - 450 10(3)/uL    % Neutrophils (External) 74.4 35.0 - 77.0 %    % Lymphocytes (External) 12.0 (L) 24.0 - 44.0 %    % Monocytes (External) 11.0 7.0 - 13.0 %    % Eosinophils (External) 1.4 0.0 - 3.0 %    % Basophils (External) 0.8 0.0 - 1.0 %    % Immature Granulocytes (External) 0.4 %    Absolute Neutrophils (External) 5.9 1.5 - 8.5 10(3)/uL    Absolute Lymphocytes (External) 1.0 (L) 1.1 - 5.0 10(3)/uL    Absolute Monocytes (External) 0.9 (H) 0.1 - 0.7 10(3)/uL    Absolute Eosinophils (External) 0.1 0.0 - 0.3 10(3)/uL    Absolute Basophils (External) 0.1 0.0 - 0.1 10(3)/uL    Absolute Immature Granulocytes (External) 0.03 10(3)/uL   Hepatic panel    Collection Time: 03/03/20 11:57 AM   Result Value Ref Range    Albumin (External) 3.6 3.5 - 5.0 g/dL    Protein Total (External) 7.9 6.4 - 8.3 g/dL    AST (External) 31 5 - 34 U/L    ALT (External) 33 0 - 55 U/L    Alk Phosphatase (External) 437 (HH) 40 - 150 U/L    Bilirubin Total (External) 0.7 0.2 - 1.2 mg/dL    Bilirubin Direct (External) 0.4 0.0 - 0.5 mg/dL   Voriconazole Level    Collection Time: 03/03/20 11:57 AM   Result Value  Ref Range    Voriconizole (External) 1.1 1.0 - 5.5 mcg/mL   CMV DNA quantification    Collection Time: 03/03/20 11:57 AM   Result Value Ref Range    CMV DNA Quant (External) <35 (A) Undetected IU/mL    Log IU/ML of CMVQNT (External) <1.54 log IU/mL   CBC with platelets    Collection Time: 03/09/20 12:43 PM   Result Value Ref Range    WBC 5.3 4.0 - 11.0 10e9/L    RBC Count 3.51 (L) 3.8 - 5.2 10e12/L    Hemoglobin 11.0 (L) 11.7 - 15.7 g/dL    Hematocrit 34.5 (L) 35.0 - 47.0 %    MCV 98 78 - 100 fl    MCH 31.3 26.5 - 33.0 pg    MCHC 31.9 31.5 - 36.5 g/dL    RDW 14.9 10.0 - 15.0 %    Platelet Count 116 (L) 150 - 450 10e9/L   Magnesium    Collection Time: 03/09/20 12:43 PM   Result Value Ref Range    Magnesium 1.6 1.6 - 2.3 mg/dL   Basic metabolic panel    Collection Time: 03/09/20 12:43 PM   Result Value Ref Range    Sodium 135 133 - 144 mmol/L    Potassium 3.7 3.4 - 5.3 mmol/L    Chloride 97 94 - 109 mmol/L    Carbon Dioxide 31 20 - 32 mmol/L    Anion Gap 7 3 - 14 mmol/L    Glucose 137 (H) 70 - 99 mg/dL    Urea Nitrogen 19 7 - 30 mg/dL    Creatinine 1.82 (H) 0.52 - 1.04 mg/dL    GFR Estimate 30 (L) >60 mL/min/[1.73_m2]    GFR Estimate If Black 35 (L) >60 mL/min/[1.73_m2]    Calcium 8.6 8.5 - 10.1 mg/dL   Hepatic panel    Collection Time: 03/09/20 12:43 PM   Result Value Ref Range    Bilirubin Direct 0.4 (H) 0.0 - 0.2 mg/dL    Bilirubin Total 0.6 0.2 - 1.3 mg/dL    Albumin 3.1 (L) 3.4 - 5.0 g/dL    Protein Total 7.5 6.8 - 8.8 g/dL    Alkaline Phosphatase 462 (H) 40 - 150 U/L    ALT 39 0 - 50 U/L    AST 35 0 - 45 U/L   General PFT Lab (Please always keep checked)    Collection Time: 03/09/20  1:32 PM   Result Value Ref Range    FVC-Pred 3.26 L    FVC-Pre 1.57 L    FVC-%Pred-Pre 48 %    FEV1-Pre 0.96 L    FEV1-%Pred-Pre 37 %    FEV1FVC-Pred 80 %    FEV1FVC-Pre 61 %    FEFMax-Pred 6.45 L/sec    FEFMax-Pre 2.62 L/sec    FEFMax-%Pred-Pre 40 %    FEF2575-Pred 2.41 L/sec    FEF2575-Pre 0.41 L/sec    UFP9287-%Pred-Pre 17 %     ExpTime-Pre 8.71 sec    FIFMax-Pre 2.66 L/sec    FEV1FEV6-Pred 81 %    FEV1FEV6-Pre 62 %        Results as noted above.    PFT Interpretation:  Severe obstructive ventilatory defect along with severe restriction.  FEV1 has declined by 40ml. There is a slow downward trend.  FEV1 remains below best of 1.6L.  Valid Maneuver    CXR (3/9/2020), personally reviewed by me: The Rt. lung field shows new Rt. ML atelectasis. Rt. internal jugular HD cath in place. No effusion. Cardiac silhouette is WNL.    Chest CT (3/9/2020) personally reviewed by me: This was c/w CT on 2/19/20, this shows new Rt. ML partial atelectasis. Rt. LL infiltrate and Left effusion is unchanged except for new air pocket. The Left LL atelectasis/infiltrate is stable. Rt. LL infiltrates are new c/w CT from 12/2020. Rt. LL bronchial mucous plugging noted.    Assessment and Plan: Kecia Blue is a 57-year-old female with a history of dermatomyositis, seronegative rheumatoid arthritis, interstitial lung disease, and pulmonary hypertension who was admitted to the hospital with acute worsening of chronic hypoxic respiratory failure in 02/2018 and progressed [facility] for [facility] heart failure and subsequently underwent bilateral sequential lung transplant on 03/01/2018. She has had significant narrowing of right bronchial anastomosis with significant granulation tissue blocking the mainstem.  She has required multiple dilations and stent placement.   She is currently on Azoles for left aspergillus empyema, ESRD on iHD and needing intermittent paracentesis due to unclear etiology.    # Bilateral Lung Transplant: She severe restriction with low PFT's. Etiology unclear - ?chest wall restriction. Possible diaphram weakness/paralysis. Deconditioning might be playing a role.  Current IS regimen:   - Tacrolimus with a goal of 10-15 nanograms/mL and prednisone. Off MMF due to recurrent N/V.   CLAD: On Azithromycin.    DSA positive with DQB7 previously. Last DSA  checked was neg (11/19/18).    3/1/2020: AZA held given leucopenia. Restart AZA when WBC is > 3.5. Her last CT showed increased right lower lobe and left lower lobe tree-in-bud and ground-glass opacities and was treated with Augmentin (2/19/19).  Chest CT shows Rt. LL infiltrates are stable to better but mucous plugging in Rt. LL and Rt. ML with atelectasis. Will initiate nebs/mucolytics.    # Right Main Bronchial Stenosis S/P Dilatation: Granulation tissue at the Rt. mainstem opening - it was removed. Last Dilation on 3/2019.  3/9/2020: Rt. lung worsening secretions, doubt due to airway stenosis. Nebs as mentioned above.    # Ascites: Has required intermittent paracentesis since 11/2019. She had Transjugular liver biopsy which showed congestive hepatopathy and Wedge pressure was 21 and free hepatic vein pressures of 7.   3/9/2020: Plan to do Rt. HC on 3/10/20. Has been by Dr. Schaffer. Last paracentesis was on 2/18/2020.    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  Dapsone for PCP prophylaxis (off bactrim due to leucopenia).  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 On Valcyte 450mg/day. Previous bronchs have had a significantly elevated CMV level.   12/4/2018: Bronch washing/BAL CMV improving - last done on 11/20/18. CMV in plasma is low level based on external lab but was negative from our lab on 11/19/18. For now we will continue Valcyte until both are negative. Last spike in plasma level on 7/20/18.  H/o C. diff colitis: Finished a 10day course of Oral Vancomycin.  H/o left-sided Aspergillus empyema (10/08/2019):  She will continue voriconazole for an undefined period of time. CT scan in 2/2020 did show an increase in left pleural effusion which is encouraging.  EBV viremia: Follow intermittently.    # Provoked Left Upper Extremity Clot and Right IJ Clot (3/7/18): completed 6months of anticoag (Coumadin).    # Dermatomyositis with  Raynaud's Phenomenon: Followed by rheumatology.  - Myositis panel was positive.    # Elevated Alk phos: Overall stable. Most likely due to Voriconazole. Will monitor.     # RADHA on CKD on iHD (since 10/2019): Followed by Nephrology.  # Hypomagnesemia: The patient remains on magnesium replacement. Her magnesium level today is acceptable.    # HTN: Well controlled on Carvedilol and amlodipine.    # Thrombocytopenia: Unclear etiology. Intermittent. Will monitor for now. If it continues to drop might benefit from Hematology input.    # Health Care Maintenance: She has had influenza vaccination for 2019/2020 season.    RTC in 4 - 6 weeks with labs including spirometry and xray.

## 2020-03-09 ENCOUNTER — ANCILLARY PROCEDURE (OUTPATIENT)
Dept: CT IMAGING | Facility: CLINIC | Age: 60
End: 2020-03-09
Attending: INTERNAL MEDICINE
Payer: COMMERCIAL

## 2020-03-09 ENCOUNTER — ANCILLARY PROCEDURE (OUTPATIENT)
Dept: GENERAL RADIOLOGY | Facility: CLINIC | Age: 60
End: 2020-03-09
Attending: INTERNAL MEDICINE
Payer: COMMERCIAL

## 2020-03-09 ENCOUNTER — OFFICE VISIT (OUTPATIENT)
Dept: TRANSPLANT | Facility: CLINIC | Age: 60
End: 2020-03-09
Attending: INTERNAL MEDICINE
Payer: COMMERCIAL

## 2020-03-09 VITALS
HEART RATE: 98 BPM | HEIGHT: 64 IN | TEMPERATURE: 97.9 F | OXYGEN SATURATION: 93 % | WEIGHT: 121 LBS | BODY MASS INDEX: 20.66 KG/M2 | SYSTOLIC BLOOD PRESSURE: 126 MMHG | DIASTOLIC BLOOD PRESSURE: 85 MMHG

## 2020-03-09 DIAGNOSIS — Z94.2 LUNG REPLACED BY TRANSPLANT (H): ICD-10-CM

## 2020-03-09 DIAGNOSIS — Z94.2 S/P LUNG TRANSPLANT (H): Primary | ICD-10-CM

## 2020-03-09 DIAGNOSIS — Z94.2 LUNG REPLACED BY TRANSPLANT (H): Primary | ICD-10-CM

## 2020-03-09 LAB
ALBUMIN SERPL-MCNC: 3.1 G/DL (ref 3.4–5)
ALP SERPL-CCNC: 462 U/L (ref 40–150)
ALT SERPL W P-5'-P-CCNC: 39 U/L (ref 0–50)
ANION GAP SERPL CALCULATED.3IONS-SCNC: 7 MMOL/L (ref 3–14)
AST SERPL W P-5'-P-CCNC: 35 U/L (ref 0–45)
BILIRUB DIRECT SERPL-MCNC: 0.4 MG/DL (ref 0–0.2)
BILIRUB SERPL-MCNC: 0.6 MG/DL (ref 0.2–1.3)
BUN SERPL-MCNC: 19 MG/DL (ref 7–30)
CALCIUM SERPL-MCNC: 8.6 MG/DL (ref 8.5–10.1)
CHLORIDE SERPL-SCNC: 97 MMOL/L (ref 94–109)
CO2 SERPL-SCNC: 31 MMOL/L (ref 20–32)
CREAT SERPL-MCNC: 1.82 MG/DL (ref 0.52–1.04)
ERYTHROCYTE [DISTWIDTH] IN BLOOD BY AUTOMATED COUNT: 14.9 % (ref 10–15)
EXPTIME-PRE: 8.71 SEC
FEF2575-%PRED-PRE: 17 %
FEF2575-PRE: 0.41 L/SEC
FEF2575-PRED: 2.41 L/SEC
FEFMAX-%PRED-PRE: 40 %
FEFMAX-PRE: 2.62 L/SEC
FEFMAX-PRED: 6.45 L/SEC
FEV1-%PRED-PRE: 37 %
FEV1-PRE: 0.96 L
FEV1FEV6-PRE: 62 %
FEV1FEV6-PRED: 81 %
FEV1FVC-PRE: 61 %
FEV1FVC-PRED: 80 %
FIFMAX-PRE: 2.66 L/SEC
FVC-%PRED-PRE: 48 %
FVC-PRE: 1.57 L
FVC-PRED: 3.26 L
GFR SERPL CREATININE-BSD FRML MDRD: 30 ML/MIN/{1.73_M2}
GLUCOSE SERPL-MCNC: 137 MG/DL (ref 70–99)
HCT VFR BLD AUTO: 34.5 % (ref 35–47)
HGB BLD-MCNC: 11 G/DL (ref 11.7–15.7)
MAGNESIUM SERPL-MCNC: 1.6 MG/DL (ref 1.6–2.3)
MCH RBC QN AUTO: 31.3 PG (ref 26.5–33)
MCHC RBC AUTO-ENTMCNC: 31.9 G/DL (ref 31.5–36.5)
MCV RBC AUTO: 98 FL (ref 78–100)
PLATELET # BLD AUTO: 116 10E9/L (ref 150–450)
POTASSIUM SERPL-SCNC: 3.7 MMOL/L (ref 3.4–5.3)
PROT SERPL-MCNC: 7.5 G/DL (ref 6.8–8.8)
RBC # BLD AUTO: 3.51 10E12/L (ref 3.8–5.2)
SODIUM SERPL-SCNC: 135 MMOL/L (ref 133–144)
WBC # BLD AUTO: 5.3 10E9/L (ref 4–11)

## 2020-03-09 PROCEDURE — 80048 BASIC METABOLIC PNL TOTAL CA: CPT | Performed by: INTERNAL MEDICINE

## 2020-03-09 PROCEDURE — G0463 HOSPITAL OUTPT CLINIC VISIT: HCPCS | Mod: ZF

## 2020-03-09 PROCEDURE — 80076 HEPATIC FUNCTION PANEL: CPT | Performed by: INTERNAL MEDICINE

## 2020-03-09 PROCEDURE — 87799 DETECT AGENT NOS DNA QUANT: CPT | Performed by: INTERNAL MEDICINE

## 2020-03-09 PROCEDURE — 36415 COLL VENOUS BLD VENIPUNCTURE: CPT | Performed by: INTERNAL MEDICINE

## 2020-03-09 PROCEDURE — 83735 ASSAY OF MAGNESIUM: CPT | Performed by: INTERNAL MEDICINE

## 2020-03-09 PROCEDURE — 80197 ASSAY OF TACROLIMUS: CPT | Performed by: INTERNAL MEDICINE

## 2020-03-09 PROCEDURE — 85027 COMPLETE CBC AUTOMATED: CPT | Performed by: INTERNAL MEDICINE

## 2020-03-09 RX ORDER — SODIUM CHLORIDE FOR INHALATION 3 %
4 VIAL, NEBULIZER (ML) INHALATION 2 TIMES DAILY
Qty: 240 ML | Refills: 3 | Status: SHIPPED | OUTPATIENT
Start: 2020-03-09 | End: 2020-04-08

## 2020-03-09 RX ORDER — ALBUTEROL SULFATE 0.83 MG/ML
2.5 SOLUTION RESPIRATORY (INHALATION) 2 TIMES DAILY
Qty: 180 ML | Refills: 3 | Status: SHIPPED | OUTPATIENT
Start: 2020-03-09 | End: 2020-04-08

## 2020-03-09 RX ORDER — AMLODIPINE BESYLATE 5 MG/1
10 TABLET ORAL DAILY
Qty: 60 TABLET | Refills: 11 | COMMUNITY
Start: 2020-03-09 | End: 2020-08-18

## 2020-03-09 ASSESSMENT — MIFFLIN-ST. JEOR: SCORE: 1118.85

## 2020-03-09 ASSESSMENT — PAIN SCALES - GENERAL: PAINLEVEL: NO PAIN (0)

## 2020-03-09 NOTE — PROGRESS NOTES
Transplant Coordinator Note    Reason for visit: Post lung transplant follow up visit   Coordinator: Present   Caregiver:      Health concerns addressed today:  1. Pt reports she is feeling good today.   2. Pt reports that she is on dialysis and is going ok.   3. CT shows infiltrates in lungs. Will start albuterol and saline nebs. Encouraged the use of the aerobica.     Activity/rehab: pt is able to do ADLs  Oxygen needs: none   Pain management/RX:   Diabetic management: NA   Next Bronch due:   High risk donor:   CMV status:  Valcyte stopped:   DVT/PE:  Post op AFIB/follow up with EP:  AC/asa:   PJP prophylactic:     Pt Education: medications (use/dose/side effects), how/when to call coordinator, frequency of labs, s/s of infection/rejection, call prior to starting any new medications, lab/vital sign book    Health Maintenance:     Last colonoscopy:     Next colonoscopy due:     Dermatology:    Vaccinations this visit:     Labs, CXR, PFTs reviewed with patient  Medication record reviewed and reconciled  Questions and concerns addressed    Patient Instructions  1. Get daily activity.    2. OK to have your RHC tomorrow.   3.  Use the aerobica to help be able to get secretions out. Do this for 5-15 minutes daily.   4. Do albuterol first and hypertonic saline nebs second. Then do aerobica in that order.      Next transplant clinic appointment:  4-6 weeks with CXR, labs and PFTs        AVS printed at time of check out

## 2020-03-09 NOTE — LETTER
3/9/2020       RE: Kecia Blue  [address] [place] [address]     Dear Colleague,    Thank you for referring your patient, Kecia Blue, to the [facility] at [facility]. Please see a copy of my visit note below.    Reason for Visit  Kecia Blue is a 57-year-old female who is being seen for RECHECK (lung TX)    Lung TX HPI  Kecia Blue is a 57-year-old female with a history of dermatomyositis, seronegative rheumatoid arthritis, interstitial lung disease, and pulmonary hypertension who was admitted to the hospital with acute worsening of chronic hypoxic respiratory failure in 02/2018 and progressed to [facility] for right heart failure and subsequently underwent bilateral sequential lung transplant on 03/01/2018.     She has had significant narrowing of right bronchial anastomosis with significant granulation tissue blocking the mainstem.  She has required multiple dilations and stent placement.     She was admitted in 10/2019 with increasing chest pain and cough. 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 She then had RADHA on CKD leading to iHD since 10/2019. She also has required paracentesis (intermittently) since 11/2019.    She was last seen on 2/9/2020 and was treated with Augmentin for bronchitis. She was scheduled for Rt. HC to assess pulm hypertension.       Interval HPI     Since her last clinic visit she has been doing well.  She denies any cough shortness of breath chest pains or wheezing.  She has been sleeping well with no issues with her pulmonary symptoms.  She still has leg swelling and.  She is doing dialysis 3 times a week.    Her last paracentesis was February 18, 2020.  She feels like the fluid is slowly building back up again.      Current Outpatient Medications   Medication     amoxicillin-clavulanate 500-125 MG PO tablet     azithromycin 250 MG PO tablet      calcium-vitamin D (CALTRATE) 600-400 MG-UNIT per tablet     carvedilol (COREG) 25 MG tablet     dapsone (ACZONE) 100 MG tablet     ferrous sulfate (FEROSUL) 325 (65 Fe) MG tablet     magnesium oxide (MAG-OX) 400 MG tablet     multivitamin, therapeutic with minerals (THERA-VIT-M) TABS tablet     ondansetron (ZOFRAN) 4 MG tablet     pantoprazole (PROTONIX) 40 MG EC tablet     predniSONE 2.5 MG PO tablet     senna-docusate (SENOKOT-S/PERICOLACE) 8.6-50 MG tablet     tacrolimus (GENERIC EQUIVALENT) 0.5 MG capsule     tacrolimus (GENERIC EQUIVALENT) 1 MG capsule     voriconazole (VFEND) 200 MG tablet     voriconazole (VFEND) 50 MG tablet     No current facility-administered medications for this visit.        Not on File      Past Medical History:   Diagnosis Date     Antisynthetase syndrome (H) 2014     Chronic cough      Chronic infection     recent C diff  8/18     Dehydration 8/1/2018     Dermatomyositis (H)      Dysplasia of cervix, low grade (ESTRADA 1)      ILD (interstitial lung disease) (H)      Osteopenia      PONV (postoperative nausea and vomiting)      Pulmonary hypertension (H)      Raynaud's disease      Seronegative rheumatoid arthritis (H)        Past Surgical History:   Procedure Laterality Date     BRONCHOSCOPY (RIGID OR FLEXIBLE), DIAGNOSTIC N/A 4/10/2018    Procedure: COMBINED BRONCHOSCOPY (RIGID OR FLEXIBLE), LAVAGE;;  Surgeon: Mariposa Donohue MD;  Location:  GI     BRONCHOSCOPY (RIGID OR FLEXIBLE), DILATE BRONCHUS / TRACHEA N/A 10/11/2018    Procedure: BRONCHOSCOPY (RIGID OR FLEXIBLE), DILATE BRONCHUS / TRACHEA;  Flexible And Rigid Bronchoscopy and Dilation;  Surgeon: Wilber Lin MD;  [facility]     BRONCHOSCOPY FLEXIBLE N/A 3/13/2018    Procedure: BRONCHOSCOPY FLEXIBLE;  Flexible Bronchoscopy ;  Surgeon: Gissell Sanchez MD;  Location:  GI     BRONCHOSCOPY FLEXIBLE N/A 5/9/2018    Procedure: BRONCHOSCOPY FLEXIBLE;;  Surgeon: Wilber Lin MD;  Location:  GI      BRONCHOSCOPY FLEXIBLE AND RIGID N/A 9/10/2018    Procedure: BRONCHOSCOPY FLEXIBLE AND RIGID;  Flexible and Rigid Bronchoscopy with Balloon Dilation, tissue debulking with cryo, and Right mainstem bronchus stent placement;  Surgeon: Wilber Lin MD;  [facility]     BRONCHOSCOPY RIGID N/A 6/6/2018    Procedure: BRONCHOSCOPY RIGID;;  Surgeon: Lopez Macias MD;  [facility]     BRONCHOSCOPY, DILATE BRONCHUS, STENT BRONCHUS, COMBINED N/A 6/11/2018    Procedure: COMBINED BRONCHOSCOPY, DILATE BRONCHUS, STENT BRONCHUS;  Flexible Bronchoscopy, Balloon Dilation, Bronchial Washings;  Surgeon: Wilber Lin MD;  [facility]     BRONCHOSCOPY, DILATE BRONCHUS, STENT BRONCHUS, COMBINED Right 7/10/2018    Procedure: COMBINED BRONCHOSCOPY, DILATE BRONCHUS, STENT BRONCHUS;  Flexible Bronchoscopy, Balloon Dilation, Bronchial Washings  ;  Surgeon: Wilber Lin MD;  [facility]     BRONCHOSCOPY, DILATE BRONCHUS, STENT BRONCHUS, COMBINED N/A 8/2/2018    Procedure: COMBINED BRONCHOSCOPY, DILATE BRONCHUS, STENT BRONCHUS;  Flexible Bronchoscopy, Bronchial Washings, Balloon Dilation;  Surgeon: Wilber Lin MD;  [facility]     BRONCHOSCOPY, DILATE BRONCHUS, STENT BRONCHUS, COMBINED N/A 8/20/2018    Procedure: COMBINED BRONCHOSCOPY, DILATE BRONCHUS, STENT BRONCHUS;  Flexible Bronchoscopy, Balloon Dilation;  Surgeon: Wilber Lin MD;  [facility]     BRONCHOSCOPY, DILATE BRONCHUS, STENT BRONCHUS, COMBINED N/A 10/29/2018    Procedure: Flexible Bronchoscopy, Balloon Dilation, Stent Revision, Airway Examination And Therapeutic Suctioning, Cyro Tumor Debulking;  Surgeon: Wilber Lin MD;  [facility]     BRONCHOSCOPY, DILATE BRONCHUS, STENT BRONCHUS, COMBINED N/A 11/20/2018    Procedure: Rigid Bronchoscopy, Stent Removal and dilitation;  Surgeon: Wilber Lin MD;  [facility]     BRONCHOSCOPY, DILATE BRONCHUS, STENT BRONCHUS, COMBINED  N/A 12/14/2018    Procedure: Flexible And Rigid Bronchoscopy, Balloon Dilation, Bronchial Washing;  Surgeon: Wilber Lin MD;  [facility]     BRONCHOSCOPY, DILATE BRONCHUS, STENT BRONCHUS, COMBINED N/A 1/17/2019    Procedure: Flexible And Rigid Bronchoscopy, Balloon Dilation.;  Surgeon: Wilber Lin MD;  [facility]     BRONCHOSCOPY, DILATE BRONCHUS, STENT BRONCHUS, COMBINED N/A 3/7/2019    Procedure: Flexible and Rigid Bronchoscopy, Bronchial Washing, Balloon Dilation;  Surgeon: Wilber Lin MD;  [facility]     BRONCHOSCOPY, DILATE BRONCHUS, STENT BRONCHUS, COMBINED N/A 6/6/2019    Procedure: Rigid and Flexible Bronchoscopy, Balloon Dilation;  Surgeon: Wilber Lin MD;  [facility]     BRONCHOSCOPY, DILATE BRONCHUS, STENT BRONCHUS, COMBINED N/A 10/11/2019    Procedure: Flexible and Rigid Bronchoscopy, Balloon Dilation, Bronchoalveolar Lagave;  Surgeon: Wilber Lin MD;  [facility]     ENT SURGERY      tonsillectomy as a child     ESOPHAGOSCOPY, GASTROSCOPY, DUODENOSCOPY (EGD), COMBINED N/A 10/29/2018    Procedure: COMBINED ESOPHAGOSCOPY, GASTROSCOPY, DUODENOSCOPY (EGD) with biopsies and polypectomy;  Surgeon: Chente Bloom MD;  [facility]     INSERT EXTRACORPORAL MEMBRANE OXYGENATOR ADULT (OUTSIDE OR) N/A 2/27/2018    Procedure: INSERT EXTRACORPORAL MEMBRANE OXYGENATOR ADULT (OUTSIDE OR);  INSERT EXTRACORPORAL MEMBRANE OXYGENATOR ADULT (OUTSIDE OR) ;  Surgeon: Toby Hernandez MD;  [facility]     IR CVC TUNNEL PLACEMENT > 5 YRS OF AGE  10/25/2019     IR PARACENTESIS  1/8/2020     IR THORACENTESIS  9/13/2019     IR TRANSCATHETER BIOPSY  1/8/2020     no prior surgery       REMOVE EXTRACORPORAL MEMBRANE OXYGENATOR ADULT N/A 3/3/2018    Procedure: REMOVE EXTRACORPORAL MEMBRANE OXYGENATOR ADULT;  Removal of Right Femoral Venous and Right Axillary Arterial Extracorporeal Membrane Oxygenator;  Surgeon: David  Toby Edwards MD;  [facility]     TRANSPLANT LUNG RECIPIENT SINGLE X2 Bilateral 3/1/2018    Procedure: TRANSPLANT LUNG RECIPIENT SINGLE X2;  Median Sternotomy, Extracorporeal Membrane Oxygenator, bilateral sequential lung transplant;  Surgeon: Toby Hernandez MD;  Location:  OR       Social History     Socioeconomic History     Marital status:      Spouse name: Not on file     Number of children: Not on file     Years of education: Not on file     Highest education level: Not on file   Occupational History     Not on file   Social Needs     Financial resource strain: Not on file     Food insecurity     Worry: Not on file     Inability: Not on file     Transportation needs     Medical: Not on file     Non-medical: Not on file   Tobacco Use     Smoking status: Never Smoker     Smokeless tobacco: Never Used   Substance and Sexual Activity     Alcohol use: No     Alcohol/week: 1.0 standard drinks     Types: 1 Glasses of wine per week     Drug use: No     Sexual activity: Not on file   Lifestyle     Physical activity     Days per week: Not on file     Minutes per session: Not on file     Stress: Not on file   Relationships     Social connections     Talks on phone: Not on file     Gets together: Not on file     Attends [employer] service: Not on file     Active member of club or organization: Not on file     Attends meetings of clubs or organizations: Not on file     Relationship status: Not on file     Intimate partner violence     Fear of current or ex partner: Not on file     Emotionally abused: Not on file     Physically abused: Not on file     Forced sexual activity: Not on file   Other Topics Concern     Parent/sibling w/ CABG, MI or angioplasty before 65F 55M? No   Social History Narrative    3/6/2019 - Lives with . Has three daughters. Four grandchildren (two ). No pets. Travelled previously to [facility]. Has visited [place] several times.        Family History  "  Problem Relation Age of Onset     Hypertension Mother      Arthritis Mother      Pancreatic Cancer Father      Diabetes Father        Pulmonary ROS  Constitutional- Positive. Gained weight  Eyes- Negative  Ear, nose and throat- Positive. Rhinorrhea.  Cardiac- Negative  Pulm- See HPI  GI- Negative.  Genitourinary- Negative  Musculoskeletal- Negative.  Neurology- Negative  Dermatology- Negative  Endocrine- Negative  Lymphatic- Negative  Psychiatry- Negative  A complete ROS was otherwise negative except as noted in the HPI.      /85   Pulse 98   Temp 97.9  F (36.6  C) (Oral)   Ht 1.626 m (5' 4\")   Wt 54.9 kg (121 lb)   LMP 06/07/2014 (Exact Date)   SpO2 93%   BMI 20.77 kg/m    Physical Exam  GENERAL APPEARANCE: Alert, Oriented x3. Not in distress.  EYES: PERRL, EOMI  HENT: Nasal mucosa with no hyperemia and no edema, no nasal polyps.  MOUTH: Oral mucosa is moist, without any lesions, no tonsillar enlargement, no oropharyngeal exudate.  NECK: Supple, no masses, no thyromegaly.  LYMPHATICS: No significant axillary, cervical, or supraclavicular nodes.  RESP: Normal percussion, good air flow throughout Left lung except the base occ insp crackles.  CV: Normal S1, S2, regular rhythm, normal rate, no rub, no murmur,  no gallop. 2+ nikki LE edema.  ABDOMEN: Bowel sounds normal, soft, nontender, no HSM or masses.  MS: Extremities normal, no clubbing, no cyanosis.  SKIN: No rash on limited exam.  NEURO: Mentation intact, speech normal, normal strength and tone, normal gait, and stance.  PSYCH: Mentation appears normal, and affect normal/bright.      Results  Recent Results (from the past 168 hour(s))   Tacrolimus level    Collection Time: 03/03/20 11:50 AM   Result Value Ref Range    Tacrolimus Last Dose 2330 3/2/20     Tacrolimus Level 8.8 5.0 - 15.0 ug/L   Basic metabolic panel    Collection Time: 03/03/20 11:57 AM   Result Value Ref Range    Sodium (External) 139 136 - 145 mmol/L    Potassium (External) 4.7 3.5 - " 5.1 mmol/L    Chloride (External) 100 98 - 107 mmol/L    CO2 (External) 24 20 - 31 mmol/L    Anion Gap (External) 19.7 mmol/L    Creatinine (External) 3.30 (H) 0.55 - 1.02 mg/dL    Urea Nitrogen (External) 36.5 (H) 7.0 - 26.0 mg/dL    BUN/Creatinine Ratio (External) 11.06 ratio    Calcium (External) 8.9 8.4 - 10.2 mg/dL    Glucose (External) 93 70 - 105 mg/dL    GFR Estimated (External) 15 (L) >=60 mL/min/1.73m2   Magnesium    Collection Time: 03/03/20 11:57 AM   Result Value Ref Range    Magnesium (External) 1.8 1.6 - 2.6 mg/dL   Phosphorus    Collection Time: 03/03/20 11:57 AM   Result Value Ref Range    Phosphorus (External) 4.7 2.3 - 4.7 mg/dL   CBC with platelets differential    Collection Time: 03/03/20 11:57 AM   Result Value Ref Range    WBC Count (External) 7.9 4.5 - 11.0 10(3)/uL    RBC Count (External) 3.82 (L) 4.00 - 5.20 10(6)/uL    Hemoglobin (External) 12.0 12.0 - 16.0 g/dL    Hematocrit (External) 37.4 33.0 - 51.0 %    MCV (External) 97.9 80.0 - 100.0 fL    MCH (External) 31.4 26.0 - 34.0 Pg    MCHC (External) 32.1 32.0 - 36.0 g/dL    RDW (External) 15.8 (H) 11.5 - 13.1 %    Platelet Count (External) 152 150 - 450 10(3)/uL    % Neutrophils (External) 74.4 35.0 - 77.0 %    % Lymphocytes (External) 12.0 (L) 24.0 - 44.0 %    % Monocytes (External) 11.0 7.0 - 13.0 %    % Eosinophils (External) 1.4 0.0 - 3.0 %    % Basophils (External) 0.8 0.0 - 1.0 %    % Immature Granulocytes (External) 0.4 %    Absolute Neutrophils (External) 5.9 1.5 - 8.5 10(3)/uL    Absolute Lymphocytes (External) 1.0 (L) 1.1 - 5.0 10(3)/uL    Absolute Monocytes (External) 0.9 (H) 0.1 - 0.7 10(3)/uL    Absolute Eosinophils (External) 0.1 0.0 - 0.3 10(3)/uL    Absolute Basophils (External) 0.1 0.0 - 0.1 10(3)/uL    Absolute Immature Granulocytes (External) 0.03 10(3)/uL   Hepatic panel    Collection Time: 03/03/20 11:57 AM   Result Value Ref Range    Albumin (External) 3.6 3.5 - 5.0 g/dL    Protein Total (External) 7.9 6.4 - 8.3 g/dL     AST (External) 31 5 - 34 U/L    ALT (External) 33 0 - 55 U/L    Alk Phosphatase (External) 437 (HH) 40 - 150 U/L    Bilirubin Total (External) 0.7 0.2 - 1.2 mg/dL    Bilirubin Direct (External) 0.4 0.0 - 0.5 mg/dL   Voriconazole Level    Collection Time: 03/03/20 11:57 AM   Result Value Ref Range    Voriconizole (External) 1.1 1.0 - 5.5 mcg/mL   CMV DNA quantification    Collection Time: 03/03/20 11:57 AM   Result Value Ref Range    CMV DNA Quant (External) <35 (A) Undetected IU/mL    Log IU/ML of CMVQNT (External) <1.54 log IU/mL   CBC with platelets    Collection Time: 03/09/20 12:43 PM   Result Value Ref Range    WBC 5.3 4.0 - 11.0 10e9/L    RBC Count 3.51 (L) 3.8 - 5.2 10e12/L    Hemoglobin 11.0 (L) 11.7 - 15.7 g/dL    Hematocrit 34.5 (L) 35.0 - 47.0 %    MCV 98 78 - 100 fl    MCH 31.3 26.5 - 33.0 pg    MCHC 31.9 31.5 - 36.5 g/dL    RDW 14.9 10.0 - 15.0 %    Platelet Count 116 (L) 150 - 450 10e9/L   Magnesium    Collection Time: 03/09/20 12:43 PM   Result Value Ref Range    Magnesium 1.6 1.6 - 2.3 mg/dL   Basic metabolic panel    Collection Time: 03/09/20 12:43 PM   Result Value Ref Range    Sodium 135 133 - 144 mmol/L    Potassium 3.7 3.4 - 5.3 mmol/L    Chloride 97 94 - 109 mmol/L    Carbon Dioxide 31 20 - 32 mmol/L    Anion Gap 7 3 - 14 mmol/L    Glucose 137 (H) 70 - 99 mg/dL    Urea Nitrogen 19 7 - 30 mg/dL    Creatinine 1.82 (H) 0.52 - 1.04 mg/dL    GFR Estimate 30 (L) >60 mL/min/[1.73_m2]    GFR Estimate If Black 35 (L) >60 mL/min/[1.73_m2]    Calcium 8.6 8.5 - 10.1 mg/dL   Hepatic panel    Collection Time: 03/09/20 12:43 PM   Result Value Ref Range    Bilirubin Direct 0.4 (H) 0.0 - 0.2 mg/dL    Bilirubin Total 0.6 0.2 - 1.3 mg/dL    Albumin 3.1 (L) 3.4 - 5.0 g/dL    Protein Total 7.5 6.8 - 8.8 g/dL    Alkaline Phosphatase 462 (H) 40 - 150 U/L    ALT 39 0 - 50 U/L    AST 35 0 - 45 U/L   General PFT Lab (Please always keep checked)    Collection Time: 03/09/20  1:32 PM   Result Value Ref Range     FVC-Pred 3.26 L    FVC-Pre 1.57 L    FVC-%Pred-Pre 48 %    FEV1-Pre 0.96 L    FEV1-%Pred-Pre 37 %    FEV1FVC-Pred 80 %    FEV1FVC-Pre 61 %    FEFMax-Pred 6.45 L/sec    FEFMax-Pre 2.62 L/sec    FEFMax-%Pred-Pre 40 %    FEF2575-Pred 2.41 L/sec    FEF2575-Pre 0.41 L/sec    JZU3468-%Pred-Pre 17 %    ExpTime-Pre 8.71 sec    FIFMax-Pre 2.66 L/sec    FEV1FEV6-Pred 81 %    FEV1FEV6-Pre 62 %        Results as noted above.    PFT Interpretation:  Severe obstructive ventilatory defect along with severe restriction.  FEV1 has declined by 40ml. There is a slow downward trend.  FEV1 remains below best of 1.6L.  Valid Maneuver    CXR (3/9/2020), personally reviewed by me: The Rt. lung field shows new Rt. ML atelectasis. Rt. internal jugular HD cath in place. No effusion. Cardiac silhouette is WNL.    Chest CT (3/9/2020) personally reviewed by me: This was c/w CT on 2/19/20, this shows new Rt. ML partial atelectasis. Rt. LL infiltrate and Left effusion is unchanged except for new air pocket. The Left LL atelectasis/infiltrate is stable. Rt. LL infiltrates are new c/w CT from 12/2020. Rt. LL bronchial mucous plugging noted.    Assessment and Plan: Kecia Blue is a 57-year-old female with a history of dermatomyositis, seronegative rheumatoid arthritis, interstitial lung disease, and pulmonary hypertension who was admitted to the hospital with acute worsening of chronic hypoxic respiratory failure in 02/2018 and progressed to [facility] for right heart failure and subsequently underwent bilateral sequential lung transplant on 03/01/2018. She has had significant narrowing of right bronchial anastomosis with significant granulation tissue blocking the mainstem.  She has required multiple dilations and stent placement.   She is currently on Azoles for left aspergillus empyema, ESRD on iHD and needing intermittent paracentesis due to unclear etiology.    # Bilateral Lung Transplant: She severe restriction with low PFT's. Etiology unclear  - ?chest wall restriction. Possible diaphram weakness/paralysis. Deconditioning might be playing a role.  Current IS regimen:   - Tacrolimus with a goal of 10-15 nanograms/mL and prednisone. Off MMF due to recurrent N/V.   CLAD: On Azithromycin.    DSA positive with DQB7 previously. Last DSA checked was neg (11/19/18).    3/1/2020: AZA held given leucopenia. Restart AZA when WBC is > 3.5. Her last CT showed increased right lower lobe and left lower lobe tree-in-bud and ground-glass opacities and was treated with Augmentin (2/19/19).  Chest CT shows Rt. LL infiltrates are stable to better but mucous plugging in Rt. LL and Rt. ML with atelectasis. Will initiate nebs/mucolytics.    # Right Main Bronchial Stenosis S/P Dilatation: Granulation tissue at the Rt. mainstem opening - it was removed. Last Dilation on 3/2019.  3/9/2020: Rt. lung worsening secretions, doubt due to airway stenosis. Nebs as mentioned above.    # Ascites: Has required intermittent paracentesis since 11/2019. She had Transjugular liver biopsy which showed congestive hepatopathy and Wedge pressure was 21 and free hepatic vein pressures of 7.   3/9/2020: Plan to do Rt. HC on 3/10/20. Has been by Dr. Schaffer. Last paracentesis was on 2/18/2020.    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  Dapsone for PCP prophylaxis (off bactrim due to leucopenia).  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 On Valcyte 450mg/day. Previous bronchs have had a significantly elevated CMV level.   12/4/2018: Bronch washing/BAL CMV improving - last done on 11/20/18. CMV in plasma is low level based on external lab but was negative from our lab on 11/19/18. For now we will continue Valcyte until both are negative. Last spike in plasma level on 7/20/18.  H/o C. diff colitis: Finished a 10day course of Oral Vancomycin.  H/o left-sided Aspergillus empyema (10/08/2019):  She will continue voriconazole for  an undefined period of time. CT scan in 2/2020 did show an increase in left pleural effusion which is encouraging.  EBV viremia: Follow intermittently.    # Provoked Left Upper Extremity Clot and Right IJ Clot (3/7/18): completed 6months of anticoag (Coumadin).    # Dermatomyositis with Raynaud's Phenomenon: Followed by rheumatology.  - Myositis panel was positive.    # Elevated Alk phos: Overall stable. Most likely due to Voriconazole. Will monitor.     # RADHA on CKD on iHD (since 10/2019): Followed by Nephrology.  # Hypomagnesemia: The patient remains on magnesium replacement. Her magnesium level today is acceptable.    # HTN: Well controlled on Carvedilol and amlodipine.    # Thrombocytopenia: Unclear etiology. Intermittent. Will monitor for now. If it continues to drop might benefit from Hematology input.    # Health Care Maintenance: She has had influenza vaccination for 2019/2020 season.    RTC in 4 - 6 weeks with labs including spirometry and xray.      Transplant Coordinator Note    Reason for visit: Post lung transplant follow up visit   Coordinator: Present   Caregiver:      Health concerns addressed today:  1. Pt reports she is feeling good today.   2. Pt reports that she is on dialysis and is going ok.   3. CT shows infiltrates in lungs. Will start albuterol and saline nebs. Encouraged the use of the aerobica.     Activity/rehab: pt is able to do ADLs  Oxygen needs: none   Pain management/RX:   Diabetic management: NA   Next Bronch due:   High risk donor:   CMV status:  Valcyte stopped:   DVT/PE:  Post op AFIB/follow up with EP:  AC/asa:   PJP prophylactic:     Pt Education: medications (use/dose/side effects), how/when to call coordinator, frequency of labs, s/s of infection/rejection, call prior to starting any new medications, lab/vital sign book    Health Maintenance:     Last colonoscopy:     Next colonoscopy due:     Dermatology:    Vaccinations this visit:     Labs, CXR, PFTs reviewed with  patient  Medication record reviewed and reconciled  Questions and concerns addressed    Patient Instructions  1. Get daily activity.    2. OK to have your RHC tomorrow.   3.  Use the aerobica to help be able to get secretions out. Do this for 5-15 minutes daily.   4. Do albuterol first and hypertonic saline nebs second. Then do aerobica in that order.      Next transplant clinic appointment:  4-6 weeks with CXR, labs and PFTs        AVS printed at time of check out        Again, thank you for allowing me to participate in the care of your patient.      Sincerely,    Tarik Christensen MD

## 2020-03-09 NOTE — LETTER
3/9/2020      RE: Kecia Blue  [id number] [place]  [facility] [address]       Reason for Visit  Kecia Blue is a 57-year-old female who is being seen for RECHECK (lung TX)    Lung TX HPI  Kecia Blue is a 57-year-old female with a history of dermatomyositis, seronegative rheumatoid arthritis, interstitial lung disease, and pulmonary hypertension who was admitted to the hospital with acute worsening of chronic hypoxic respiratory failure in 02/2018 and progressed to [facility] for right heart failure and subsequently underwent bilateral sequential lung transplant on 03/01/2018.     She has had significant narrowing of right bronchial anastomosis with significant granulation tissue blocking the mainstem.  She has required multiple dilations and stent placement.     She was admitted in 10/2019 with increasing chest pain and cough. 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 She then had RADHA on CKD leading to iHD since 10/2019. She also has required paracentesis (intermittently) since 11/2019.    She was last seen on 2/9/2020 and was treated with Augmentin for bronchitis. She was scheduled for Rt. HC to assess pulm hypertension.       Interval HPI     Since her last clinic visit she has been doing well.  She denies any cough shortness of breath chest pains or wheezing.  She has been sleeping well with no issues with her pulmonary symptoms.  She still has leg swelling and.  She is doing dialysis 3 times a week.    Her last paracentesis was February 18, 2020.  She feels like the fluid is slowly building back up again.      Current Outpatient Medications   Medication     amoxicillin-clavulanate 500-125 MG PO tablet     azithromycin 250 MG PO tablet     calcium-vitamin D (CALTRATE) 600-400 MG-UNIT per tablet     carvedilol (COREG) 25 MG tablet     dapsone (ACZONE) 100 MG tablet     ferrous sulfate (FEROSUL) 325 (65 Fe) MG tablet     magnesium oxide (MAG-OX) 400 MG  tablet     multivitamin, therapeutic with minerals (THERA-VIT-M) TABS tablet     ondansetron (ZOFRAN) 4 MG tablet     pantoprazole (PROTONIX) 40 MG EC tablet     predniSONE 2.5 MG PO tablet     senna-docusate (SENOKOT-S/PERICOLACE) 8.6-50 MG tablet     tacrolimus (GENERIC EQUIVALENT) 0.5 MG capsule     tacrolimus (GENERIC EQUIVALENT) 1 MG capsule     voriconazole (VFEND) 200 MG tablet     voriconazole (VFEND) 50 MG tablet     No current facility-administered medications for this visit.        Not on File      Past Medical History:   Diagnosis Date     Antisynthetase syndrome (H) 2014     Chronic cough      Chronic infection     recent C diff  8/18     Dehydration 8/1/2018     Dermatomyositis (H)      Dysplasia of cervix, low grade (ESTRADA 1)      ILD (interstitial lung disease) (H)      Osteopenia      PONV (postoperative nausea and vomiting)      Pulmonary hypertension (H)      Raynaud's disease      Seronegative rheumatoid arthritis (H)        Past Surgical History:   Procedure Laterality Date     BRONCHOSCOPY (RIGID OR FLEXIBLE), DIAGNOSTIC N/A 4/10/2018    Procedure: COMBINED BRONCHOSCOPY (RIGID OR FLEXIBLE), LAVAGE;;  Surgeon: Mariposa Donohue MD;  Location: [facility]     BRONCHOSCOPY (RIGID OR FLEXIBLE), DILATE BRONCHUS / TRACHEA N/A 10/11/2018    Procedure: BRONCHOSCOPY (RIGID OR FLEXIBLE), DILATE BRONCHUS / TRACHEA;  Flexible And Rigid Bronchoscopy and Dilation;  Surgeon: Wilber Lin MD;  [facility]     BRONCHOSCOPY FLEXIBLE N/A 3/13/2018    Procedure: BRONCHOSCOPY FLEXIBLE;  Flexible Bronchoscopy ;  Surgeon: Gissell Sanchez MD;  Location: [facility]     BRONCHOSCOPY FLEXIBLE N/A 5/9/2018    Procedure: BRONCHOSCOPY FLEXIBLE;;  Surgeon: Wilber Lin MD;  [facility]: [facility]     BRONCHOSCOPY FLEXIBLE AND RIGID N/A 9/10/2018    Procedure: BRONCHOSCOPY FLEXIBLE AND RIGID;  Flexible and Rigid Bronchoscopy with Balloon Dilation, tissue debulking with cryo, and Right mainstem bronchus stent  placement;  Surgeon: Wilber Lin MD;  [facility]     BRONCHOSCOPY RIGID N/A 6/6/2018    Procedure: BRONCHOSCOPY RIGID;;  Surgeon: Lopez Macias MD;  [facility]: [facility]     BRONCHOSCOPY, DILATE BRONCHUS, STENT BRONCHUS, COMBINED N/A 6/11/2018    Procedure: COMBINED BRONCHOSCOPY, DILATE BRONCHUS, STENT BRONCHUS;  Flexible Bronchoscopy, Balloon Dilation, Bronchial Washings;  Surgeon: Wilber Lin MD;  [facility]     BRONCHOSCOPY, DILATE BRONCHUS, STENT BRONCHUS, COMBINED Right 7/10/2018    Procedure: COMBINED BRONCHOSCOPY, DILATE BRONCHUS, STENT BRONCHUS;  Flexible Bronchoscopy, Balloon Dilation, Bronchial Washings  ;  Surgeon: Wilber Lin MD;  [facility]     BRONCHOSCOPY, DILATE BRONCHUS, STENT BRONCHUS, COMBINED N/A 8/2/2018    Procedure: COMBINED BRONCHOSCOPY, DILATE BRONCHUS, STENT BRONCHUS;  Flexible Bronchoscopy, Bronchial Washings, Balloon Dilation;  Surgeon: Wilber Lin MD;  [facility]     BRONCHOSCOPY, DILATE BRONCHUS, STENT BRONCHUS, COMBINED N/A 8/20/2018    Procedure: COMBINED BRONCHOSCOPY, DILATE BRONCHUS, STENT BRONCHUS;  Flexible Bronchoscopy, Balloon Dilation;  Surgeon: Wilber Lin MD;  [facility]     BRONCHOSCOPY, DILATE BRONCHUS, STENT BRONCHUS, COMBINED N/A 10/29/2018    Procedure: Flexible Bronchoscopy, Balloon Dilation, Stent Revision, Airway Examination And Therapeutic Suctioning, Cyro Tumor Debulking;  Surgeon: Wilber Lin MD;  [facility]     BRONCHOSCOPY, DILATE BRONCHUS, STENT BRONCHUS, COMBINED N/A 11/20/2018    Procedure: Rigid Bronchoscopy, Stent Removal and dilitation;  Surgeon: Wilber Lin MD;  [facility]     BRONCHOSCOPY, DILATE BRONCHUS, STENT BRONCHUS, COMBINED N/A 12/14/2018    Procedure: Flexible And Rigid Bronchoscopy, Balloon Dilation, Bronchial Washing;  Surgeon: Wilber Lin MD;  [facility]     BRONCHOSCOPY, DILATE BRONCHUS, STENT BRONCHUS, COMBINED  N/A 1/17/2019    Procedure: Flexible And Rigid Bronchoscopy, Balloon Dilation.;  Surgeon: Wilber Lin MD;  [facility]     BRONCHOSCOPY, DILATE BRONCHUS, STENT BRONCHUS, COMBINED N/A 3/7/2019    Procedure: Flexible and Rigid Bronchoscopy, Bronchial Washing, Balloon Dilation;  Surgeon: Wilber Lin MD;  [facility]     BRONCHOSCOPY, DILATE BRONCHUS, STENT BRONCHUS, COMBINED N/A 6/6/2019    Procedure: Rigid and Flexible Bronchoscopy, Balloon Dilation;  Surgeon: Wilber Lin MD;  [facility]     BRONCHOSCOPY, DILATE BRONCHUS, STENT BRONCHUS, COMBINED N/A 10/11/2019    Procedure: Flexible and Rigid Bronchoscopy, Balloon Dilation, Bronchoalveolar Lagave;  Surgeon: Wilber Lin MD;  [facility]     ENT SURGERY      tonsillectomy as a child     ESOPHAGOSCOPY, GASTROSCOPY, DUODENOSCOPY (EGD), COMBINED N/A 10/29/2018    Procedure: COMBINED ESOPHAGOSCOPY, GASTROSCOPY, DUODENOSCOPY (EGD) with biopsies and polypectomy;  Surgeon: Chente Bloom MD;  [facility]     INSERT EXTRACORPORAL MEMBRANE OXYGENATOR ADULT (OUTSIDE OR) N/A 2/27/2018    Procedure: INSERT EXTRACORPORAL MEMBRANE OXYGENATOR ADULT (OUTSIDE OR);  INSERT EXTRACORPORAL MEMBRANE OXYGENATOR ADULT (OUTSIDE OR) ;  Surgeon: Toby Hernandez MD;  [facility]     IR CVC TUNNEL PLACEMENT > 5 YRS OF AGE  10/25/2019     IR PARACENTESIS  1/8/2020     IR THORACENTESIS  9/13/2019     IR TRANSCATHETER BIOPSY  1/8/2020     no prior surgery       REMOVE EXTRACORPORAL MEMBRANE OXYGENATOR ADULT N/A 3/3/2018    Procedure: REMOVE EXTRACORPORAL MEMBRANE OXYGENATOR ADULT;  Removal of Right Femoral Venous and Right Axillary Arterial Extracorporeal Membrane Oxygenator;  Surgeon: Toby Hernandez MD;  [facility]     TRANSPLANT LUNG RECIPIENT SINGLE X2 Bilateral 3/1/2018    Procedure: TRANSPLANT LUNG RECIPIENT SINGLE X2;  Median Sternotomy, Extracorporeal Membrane Oxygenator, bilateral  sequential lung transplant;  Surgeon: Toby Hernandez MD;  Location:  OR       Social History     Socioeconomic History     Marital status:      Spouse name: Not on file     Number of children: Not on file     Years of education: Not on file     Highest education level: Not on file   Occupational History     Not on file   Social Needs     Financial resource strain: Not on file     Food insecurity     Worry: Not on file     Inability: Not on file     Transportation needs     Medical: Not on file     Non-medical: Not on file   Tobacco Use     Smoking status: Never Smoker     Smokeless tobacco: Never Used   Substance and Sexual Activity     Alcohol use: No     Alcohol/week: 1.0 standard drinks     Types: 1 Glasses of wine per week     Drug use: No     Sexual activity: Not on file   Lifestyle     Physical activity     Days per week: Not on file     Minutes per session: Not on file     Stress: Not on file   Relationships     Social connections     Talks on phone: Not on file     Gets together: Not on file     Attends Yazdanism service: Not on file     Active member of club or organization: Not on file     Attends meetings of clubs or organizations: Not on file     Relationship status: Not on file     Intimate partner violence     Fear of current or ex partner: Not on file     Emotionally abused: Not on file     Physically abused: Not on file     Forced sexual activity: Not on file   Other Topics Concern     Parent/sibling w/ CABG, MI or angioplasty before 65F 55M? No   Social History Narrative    3/6/2019 - Lives with . Has three daughters. Four grandchildren (two ). No pets. Travelled previously to [place], [place]. Has visited [place] several times.        Family History   Problem Relation Age of Onset     Hypertension Mother      Arthritis Mother      Pancreatic Cancer Father      Diabetes Father        Pulmonary ROS  Constitutional- Positive. Gained weight  Eyes- Negative  Ear, nose  "and throat- Positive. Rhinorrhea.  Cardiac- Negative  Pulm- See HPI  GI- Negative.  Genitourinary- Negative  Musculoskeletal- Negative.  Neurology- Negative  Dermatology- Negative  Endocrine- Negative  Lymphatic- Negative  Psychiatry- Negative  A complete ROS was otherwise negative except as noted in the HPI.      /85   Pulse 98   Temp 97.9  F (36.6  C) (Oral)   Ht 1.626 m (5' 4\")   Wt 54.9 kg (121 lb)   LMP 06/07/2014 (Exact Date)   SpO2 93%   BMI 20.77 kg/m    Physical Exam  GENERAL APPEARANCE: Alert, Oriented x3. Not in distress.  EYES: PERRL, EOMI  HENT: Nasal mucosa with no hyperemia and no edema, no nasal polyps.  MOUTH: Oral mucosa is moist, without any lesions, no tonsillar enlargement, no oropharyngeal exudate.  NECK: Supple, no masses, no thyromegaly.  LYMPHATICS: No significant axillary, cervical, or supraclavicular nodes.  RESP: Normal percussion, good air flow throughout Left lung except the base occ insp crackles.  CV: Normal S1, S2, regular rhythm, normal rate, no rub, no murmur,  no gallop. 2+ nikki LE edema.  ABDOMEN: Bowel sounds normal, soft, nontender, no HSM or masses.  MS: Extremities normal, no clubbing, no cyanosis.  SKIN: No rash on limited exam.  NEURO: Mentation intact, speech normal, normal strength and tone, normal gait, and stance.  PSYCH: Mentation appears normal, and affect normal/bright.      Results  Recent Results (from the past 168 hour(s))   Tacrolimus level    Collection Time: 03/03/20 11:50 AM   Result Value Ref Range    Tacrolimus Last Dose 2330 3/2/20     Tacrolimus Level 8.8 5.0 - 15.0 ug/L   Basic metabolic panel    Collection Time: 03/03/20 11:57 AM   Result Value Ref Range    Sodium (External) 139 136 - 145 mmol/L    Potassium (External) 4.7 3.5 - 5.1 mmol/L    Chloride (External) 100 98 - 107 mmol/L    CO2 (External) 24 20 - 31 mmol/L    Anion Gap (External) 19.7 mmol/L    Creatinine (External) 3.30 (H) 0.55 - 1.02 mg/dL    Urea Nitrogen (External) 36.5 (H) " 7.0 - 26.0 mg/dL    BUN/Creatinine Ratio (External) 11.06 ratio    Calcium (External) 8.9 8.4 - 10.2 mg/dL    Glucose (External) 93 70 - 105 mg/dL    GFR Estimated (External) 15 (L) >=60 mL/min/1.73m2   Magnesium    Collection Time: 03/03/20 11:57 AM   Result Value Ref Range    Magnesium (External) 1.8 1.6 - 2.6 mg/dL   Phosphorus    Collection Time: 03/03/20 11:57 AM   Result Value Ref Range    Phosphorus (External) 4.7 2.3 - 4.7 mg/dL   CBC with platelets differential    Collection Time: 03/03/20 11:57 AM   Result Value Ref Range    WBC Count (External) 7.9 4.5 - 11.0 10(3)/uL    RBC Count (External) 3.82 (L) 4.00 - 5.20 10(6)/uL    Hemoglobin (External) 12.0 12.0 - 16.0 g/dL    Hematocrit (External) 37.4 33.0 - 51.0 %    MCV (External) 97.9 80.0 - 100.0 fL    MCH (External) 31.4 26.0 - 34.0 Pg    MCHC (External) 32.1 32.0 - 36.0 g/dL    RDW (External) 15.8 (H) 11.5 - 13.1 %    Platelet Count (External) 152 150 - 450 10(3)/uL    % Neutrophils (External) 74.4 35.0 - 77.0 %    % Lymphocytes (External) 12.0 (L) 24.0 - 44.0 %    % Monocytes (External) 11.0 7.0 - 13.0 %    % Eosinophils (External) 1.4 0.0 - 3.0 %    % Basophils (External) 0.8 0.0 - 1.0 %    % Immature Granulocytes (External) 0.4 %    Absolute Neutrophils (External) 5.9 1.5 - 8.5 10(3)/uL    Absolute Lymphocytes (External) 1.0 (L) 1.1 - 5.0 10(3)/uL    Absolute Monocytes (External) 0.9 (H) 0.1 - 0.7 10(3)/uL    Absolute Eosinophils (External) 0.1 0.0 - 0.3 10(3)/uL    Absolute Basophils (External) 0.1 0.0 - 0.1 10(3)/uL    Absolute Immature Granulocytes (External) 0.03 10(3)/uL   Hepatic panel    Collection Time: 03/03/20 11:57 AM   Result Value Ref Range    Albumin (External) 3.6 3.5 - 5.0 g/dL    Protein Total (External) 7.9 6.4 - 8.3 g/dL    AST (External) 31 5 - 34 U/L    ALT (External) 33 0 - 55 U/L    Alk Phosphatase (External) 437 (HH) 40 - 150 U/L    Bilirubin Total (External) 0.7 0.2 - 1.2 mg/dL    Bilirubin Direct (External) 0.4 0.0 - 0.5  mg/dL   Voriconazole Level    Collection Time: 03/03/20 11:57 AM   Result Value Ref Range    Voriconizole (External) 1.1 1.0 - 5.5 mcg/mL   CMV DNA quantification    Collection Time: 03/03/20 11:57 AM   Result Value Ref Range    CMV DNA Quant (External) <35 (A) Undetected IU/mL    Log IU/ML of CMVQNT (External) <1.54 log IU/mL   CBC with platelets    Collection Time: 03/09/20 12:43 PM   Result Value Ref Range    WBC 5.3 4.0 - 11.0 10e9/L    RBC Count 3.51 (L) 3.8 - 5.2 10e12/L    Hemoglobin 11.0 (L) 11.7 - 15.7 g/dL    Hematocrit 34.5 (L) 35.0 - 47.0 %    MCV 98 78 - 100 fl    MCH 31.3 26.5 - 33.0 pg    MCHC 31.9 31.5 - 36.5 g/dL    RDW 14.9 10.0 - 15.0 %    Platelet Count 116 (L) 150 - 450 10e9/L   Magnesium    Collection Time: 03/09/20 12:43 PM   Result Value Ref Range    Magnesium 1.6 1.6 - 2.3 mg/dL   Basic metabolic panel    Collection Time: 03/09/20 12:43 PM   Result Value Ref Range    Sodium 135 133 - 144 mmol/L    Potassium 3.7 3.4 - 5.3 mmol/L    Chloride 97 94 - 109 mmol/L    Carbon Dioxide 31 20 - 32 mmol/L    Anion Gap 7 3 - 14 mmol/L    Glucose 137 (H) 70 - 99 mg/dL    Urea Nitrogen 19 7 - 30 mg/dL    Creatinine 1.82 (H) 0.52 - 1.04 mg/dL    GFR Estimate 30 (L) >60 mL/min/[1.73_m2]    GFR Estimate If Black 35 (L) >60 mL/min/[1.73_m2]    Calcium 8.6 8.5 - 10.1 mg/dL   Hepatic panel    Collection Time: 03/09/20 12:43 PM   Result Value Ref Range    Bilirubin Direct 0.4 (H) 0.0 - 0.2 mg/dL    Bilirubin Total 0.6 0.2 - 1.3 mg/dL    Albumin 3.1 (L) 3.4 - 5.0 g/dL    Protein Total 7.5 6.8 - 8.8 g/dL    Alkaline Phosphatase 462 (H) 40 - 150 U/L    ALT 39 0 - 50 U/L    AST 35 0 - 45 U/L   General PFT Lab (Please always keep checked)    Collection Time: 03/09/20  1:32 PM   Result Value Ref Range    FVC-Pred 3.26 L    FVC-Pre 1.57 L    FVC-%Pred-Pre 48 %    FEV1-Pre 0.96 L    FEV1-%Pred-Pre 37 %    FEV1FVC-Pred 80 %    FEV1FVC-Pre 61 %    FEFMax-Pred 6.45 L/sec    FEFMax-Pre 2.62 L/sec    FEFMax-%Pred-Pre 40 %     FEF2575-Pred 2.41 L/sec    FEF2575-Pre 0.41 L/sec    FOG2906-%Pred-Pre 17 %    ExpTime-Pre 8.71 sec    FIFMax-Pre 2.66 L/sec    FEV1FEV6-Pred 81 %    FEV1FEV6-Pre 62 %        Results as noted above.    PFT Interpretation:  Severe obstructive ventilatory defect along with severe restriction.  FEV1 has declined by 40ml. There is a slow downward trend.  FEV1 remains below best of 1.6L.  Valid Maneuver    CXR (3/9/2020), personally reviewed by me: The Rt. lung field shows new Rt. ML atelectasis. Rt. internal jugular HD cath in place. No effusion. Cardiac silhouette is WNL.    Chest CT (3/9/2020) personally reviewed by me: This was c/w CT on 2/19/20, this shows new Rt. ML partial atelectasis. Rt. LL infiltrate and Left effusion is unchanged except for new air pocket. The Left LL atelectasis/infiltrate is stable. Rt. LL infiltrates are new c/w CT from 12/2020. Rt. LL bronchial mucous plugging noted.    Assessment and Plan: Kecia Blue is a 57-year-old female with a history of dermatomyositis, seronegative rheumatoid arthritis, interstitial lung disease, and pulmonary hypertension who was admitted to the hospital with acute worsening of chronic hypoxic respiratory failure in 02/2018 and progressed [facility] for right heart failure and subsequently underwent bilateral sequential lung transplant on 03/01/2018. She has had significant narrowing of right bronchial anastomosis with significant granulation tissue blocking the mainstem.  She has required multiple dilations and stent placement.   She is currently on Azoles for left aspergillus empyema, ESRD on iHD and needing intermittent paracentesis due to unclear etiology.    # Bilateral Lung Transplant: She severe restriction with low PFT's. Etiology unclear - ?chest wall restriction. Possible diaphram weakness/paralysis. Deconditioning might be playing a role.  Current IS regimen:   - Tacrolimus with a goal of 10-15 nanograms/mL and prednisone. Off MMF due to recurrent  N/V.   CLAD: On Azithromycin.    DSA positive with DQB7 previously. Last DSA checked was neg (11/19/18).    3/1/2020: AZA held given leucopenia. Restart AZA when WBC is > 3.5. Her last CT showed increased right lower lobe and left lower lobe tree-in-bud and ground-glass opacities and was treated with Augmentin (2/19/19).  Chest CT shows Rt. LL infiltrates are stable to better but mucous plugging in Rt. LL and Rt. ML with atelectasis. Will initiate nebs/mucolytics.    # Right Main Bronchial Stenosis S/P Dilatation: Granulation tissue at the Rt. mainstem opening - it was removed. Last Dilation on 3/2019.  3/9/2020: Rt. lung worsening secretions, doubt due to airway stenosis. Nebs as mentioned above.    # Ascites: Has required intermittent paracentesis since 11/2019. She had Transjugular liver biopsy which showed congestive hepatopathy and Wedge pressure was 21 and free hepatic vein pressures of 7.   3/9/2020: Plan to do Rt. HC on 3/10/20. Has been by Dr. Schaffer. Last paracentesis was on 2/18/2020.    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 On Valcyte 450mg/day. Previous bronchs have had a significantly elevated CMV level.   12/4/2018: Bronch washing/BAL CMV improving - last done on 11/20/18. CMV in plasma is low level based on external lab but was negative from our lab on 11/19/18. For now we will continue Valcyte until both are negative. Last spike in plasma level on 7/20/18.  H/o C. diff colitis: Finished a 10day course of Oral Vancomycin.  H/o left-sided Aspergillus empyema (10/08/2019):  She will continue voriconazole for an undefined period of time. CT scan in 2/2020 did show an increase in left pleural effusion which is encouraging.  EBV viremia: Follow intermittently.    # Provoked Left Upper Extremity Clot and Right IJ Clot  (3/7/18): completed 6months of anticoag (Coumadin).    # Dermatomyositis with Raynaud's Phenomenon: Followed by rheumatology.  - Myositis panel was positive.    # Elevated Alk phos: Overall stable. Most likely due to Voriconazole. Will monitor.     # RADHA on CKD on iHD (since 10/2019): Followed by Nephrology.  # Hypomagnesemia: The patient remains on magnesium replacement. Her magnesium level today is acceptable.    # HTN: Well controlled on Carvedilol and amlodipine.    # Thrombocytopenia: Unclear etiology. Intermittent. Will monitor for now. If it continues to drop might benefit from Hematology input.    # Health Care Maintenance: She has had influenza vaccination for 2019/2020 season.    RTC in 4 - 6 weeks with labs including spirometry and xray.      Transplant Coordinator Note    Reason for visit: Post lung transplant follow up visit   Coordinator: Present   Caregiver:      Health concerns addressed today:  1. Pt reports she is feeling good today.   2. Pt reports that she is on dialysis and is going ok.   3. CT shows infiltrates in lungs. Will start albuterol and saline nebs. Encouraged the use of the aerobica.     Activity/rehab: pt is able to do ADLs  Oxygen needs: none   Pain management/RX:   Diabetic management: NA   Next Bronch due:   High risk donor:   CMV status:  Valcyte stopped:   DVT/PE:  Post op AFIB/follow up with EP:  AC/asa:   PJP prophylactic:     Pt Education: medications (use/dose/side effects), how/when to call coordinator, frequency of labs, s/s of infection/rejection, call prior to starting any new medications, lab/vital sign book    Health Maintenance:     Last colonoscopy:     Next colonoscopy due:     Dermatology:    Vaccinations this visit:     Labs, CXR, PFTs reviewed with patient  Medication record reviewed and reconciled  Questions and concerns addressed    Patient Instructions  1. Get daily activity.    2. OK to have your RHC tomorrow.   3.  Use the aerobica to help be able to  get secretions out. Do this for 5-15 minutes daily.   4. Do albuterol first and hypertonic saline nebs second. Then do aerobica in that order.      Next transplant clinic appointment:  4-6 weeks with CXR, labs and PFTs        AVS printed at time of check out        Tarik Christensen MD

## 2020-03-09 NOTE — PATIENT INSTRUCTIONS
Patient Instructions  1. Get daily activity.    2. OK to have your RHC tomorrow.   3.  Use the aerobica to help be able to get secretions out. Do this for 5-15 minutes daily.   4. Do albuterol first and hypertonic saline nebs second. Then do aerobica in that order.        Next transplant clinic appointment:  4-6 weeks with CXR, labs and PFTs        AVS printed at time of check out

## 2020-03-10 ENCOUNTER — HOSPITAL ENCOUNTER (OUTPATIENT)
Facility: CLINIC | Age: 60
Discharge: HOME | End: 2020-03-10
Attending: INTERNAL MEDICINE | Admitting: INTERNAL MEDICINE
Payer: COMMERCIAL

## 2020-03-10 ENCOUNTER — APPOINTMENT (OUTPATIENT)
Dept: MEDSURG UNIT | Facility: CLINIC | Age: 60
End: 2020-03-10
Attending: INTERNAL MEDICINE
Payer: COMMERCIAL

## 2020-03-10 ENCOUNTER — HEALTH MAINTENANCE LETTER (OUTPATIENT)
Age: 60
End: 2020-03-10

## 2020-03-10 ENCOUNTER — TELEPHONE (OUTPATIENT)
Dept: TRANSPLANT | Facility: CLINIC | Age: 60
End: 2020-03-10

## 2020-03-10 VITALS
WEIGHT: 121 LBS | OXYGEN SATURATION: 92 % | BODY MASS INDEX: 20.66 KG/M2 | RESPIRATION RATE: 16 BRPM | DIASTOLIC BLOOD PRESSURE: 79 MMHG | HEIGHT: 64 IN | SYSTOLIC BLOOD PRESSURE: 117 MMHG | TEMPERATURE: 98.2 F

## 2020-03-10 DIAGNOSIS — R06.02 SHORTNESS OF BREATH: ICD-10-CM

## 2020-03-10 DIAGNOSIS — I27.20 PULMONARY HYPERTENSION (H): ICD-10-CM

## 2020-03-10 DIAGNOSIS — Z94.2 LUNG REPLACED BY TRANSPLANT (H): ICD-10-CM

## 2020-03-10 LAB
ALBUMIN SERPL-MCNC: 2.9 G/DL (ref 3.4–5)
ALP SERPL-CCNC: 420 U/L (ref 40–150)
ALT SERPL W P-5'-P-CCNC: 36 U/L (ref 0–50)
ANION GAP SERPL CALCULATED.3IONS-SCNC: 5 MMOL/L (ref 3–14)
AST SERPL W P-5'-P-CCNC: 28 U/L (ref 0–45)
BASOPHILS # BLD AUTO: 0 10E9/L (ref 0–0.2)
BASOPHILS NFR BLD AUTO: 0.5 %
BILIRUB DIRECT SERPL-MCNC: 0.4 MG/DL (ref 0–0.2)
BILIRUB SERPL-MCNC: 0.6 MG/DL (ref 0.2–1.3)
BUN SERPL-MCNC: 37 MG/DL (ref 7–30)
CALCIUM SERPL-MCNC: 8.6 MG/DL (ref 8.5–10.1)
CHLORIDE SERPL-SCNC: 102 MMOL/L (ref 94–109)
CMV DNA SPEC NAA+PROBE-ACNC: <137 [IU]/ML
CMV DNA SPEC NAA+PROBE-LOG#: <2.1 {LOG_IU}/ML
CO2 SERPL-SCNC: 31 MMOL/L (ref 20–32)
CREAT SERPL-MCNC: 2.99 MG/DL (ref 0.52–1.04)
DIFFERENTIAL METHOD BLD: ABNORMAL
EBV DNA # SPEC NAA+PROBE: 8918 {COPIES}/ML
EBV DNA SPEC NAA+PROBE-LOG#: 4 {LOG_COPIES}/ML
EOSINOPHIL # BLD AUTO: 0.1 10E9/L (ref 0–0.7)
EOSINOPHIL NFR BLD AUTO: 1.6 %
ERYTHROCYTE [DISTWIDTH] IN BLOOD BY AUTOMATED COUNT: 14.8 % (ref 10–15)
GFR SERPL CREATININE-BSD FRML MDRD: 17 ML/MIN/{1.73_M2}
GLUCOSE SERPL-MCNC: 106 MG/DL (ref 70–99)
HCT VFR BLD AUTO: 33.1 % (ref 35–47)
HGB BLD-MCNC: 10.3 G/DL (ref 11.7–15.7)
IMM GRANULOCYTES # BLD: 0 10E9/L (ref 0–0.4)
IMM GRANULOCYTES NFR BLD: 0.3 %
LYMPHOCYTES # BLD AUTO: 0.5 10E9/L (ref 0.8–5.3)
LYMPHOCYTES NFR BLD AUTO: 8.4 %
MCH RBC QN AUTO: 31 PG (ref 26.5–33)
MCHC RBC AUTO-ENTMCNC: 31.1 G/DL (ref 31.5–36.5)
MCV RBC AUTO: 100 FL (ref 78–100)
MONOCYTES # BLD AUTO: 0.6 10E9/L (ref 0–1.3)
MONOCYTES NFR BLD AUTO: 10.2 %
NEUTROPHILS # BLD AUTO: 5 10E9/L (ref 1.6–8.3)
NEUTROPHILS NFR BLD AUTO: 79 %
NRBC # BLD AUTO: 0 10*3/UL
NRBC BLD AUTO-RTO: 0 /100
PLATELET # BLD AUTO: 113 10E9/L (ref 150–450)
POTASSIUM SERPL-SCNC: 4.2 MMOL/L (ref 3.4–5.3)
PROT SERPL-MCNC: 7 G/DL (ref 6.8–8.8)
RBC # BLD AUTO: 3.32 10E12/L (ref 3.8–5.2)
SODIUM SERPL-SCNC: 138 MMOL/L (ref 133–144)
SPECIMEN SOURCE: ABNORMAL
TACROLIMUS BLD-MCNC: 23.7 UG/L (ref 5–15)
TACROLIMUS BLD-MCNC: 7.7 UG/L (ref 5–15)
TME LAST DOSE: ABNORMAL H
TME LAST DOSE: NORMAL H
WBC # BLD AUTO: 6.3 10E9/L (ref 4–11)

## 2020-03-10 PROCEDURE — 93451 RIGHT HEART CATH: CPT | Performed by: INTERNAL MEDICINE

## 2020-03-10 PROCEDURE — 82248 BILIRUBIN DIRECT: CPT | Performed by: INTERNAL MEDICINE

## 2020-03-10 PROCEDURE — 36415 COLL VENOUS BLD VENIPUNCTURE: CPT | Performed by: INTERNAL MEDICINE

## 2020-03-10 PROCEDURE — 25000125 ZZHC RX 250: Performed by: INTERNAL MEDICINE

## 2020-03-10 PROCEDURE — 80197 ASSAY OF TACROLIMUS: CPT | Performed by: INTERNAL MEDICINE

## 2020-03-10 PROCEDURE — 40000166 ZZH STATISTIC PP CARE STAGE 1

## 2020-03-10 PROCEDURE — 80053 COMPREHEN METABOLIC PANEL: CPT | Performed by: INTERNAL MEDICINE

## 2020-03-10 PROCEDURE — 27210794 ZZH OR GENERAL SUPPLY STERILE: Performed by: INTERNAL MEDICINE

## 2020-03-10 PROCEDURE — 85025 COMPLETE CBC W/AUTO DIFF WBC: CPT | Performed by: INTERNAL MEDICINE

## 2020-03-10 RX ORDER — LIDOCAINE 40 MG/G
CREAM TOPICAL
Status: COMPLETED | OUTPATIENT
Start: 2020-03-10 | End: 2020-03-10

## 2020-03-10 RX ADMIN — LIDOCAINE: 40 CREAM TOPICAL at 10:39

## 2020-03-10 ASSESSMENT — MIFFLIN-ST. JEOR: SCORE: 1118.85

## 2020-03-10 NOTE — PROGRESS NOTES
Patient returned to 2A post RHC. VSS, right neck site clean/dry/intact, no hematoma, and denies pain. Patient declined PO food and fluids. Teaching was done and discharge instructions were given.  Patient discharged from the hospital to home with  @ 1140.

## 2020-03-10 NOTE — DISCHARGE INSTRUCTIONS
[facility]                        Interventional Cardiology  Discharge Instructions   Post Right Heart Cath      AFTER YOU GO HOME:    DO drink plenty of fluids    DO resume your regular diet and medications unless otherwise instructed by your Primary Physician    Do Not scrub the procedure site vigorously    No lotion or powder to the puncture site for 3 days    CALL YOUR PRIMARY PHYSICIAN IF: You may resume all normal activity.  Monitor neck site for bleeding, swelling, or voice changes. If you notice bleeding or swelling immediately apply pressure to the site and call number below to speak with Cardiology Fellow.  If you experience any changes in your breathing you should call your doctor immediately or come to the closest Emergency Department.  Do not drive yourself.    ADDITIONAL INSTRUCTIONS: Medications: You are to resume all home medications including anticoagulation therapy unless otherwise advised by your primary cardiologist or nurse coordinator.    Follow Up: Per your primary cardiology team    If you have any questions or concerns regarding your procedure site please call [phone] at anytime and ask for Cardiology Fellow on call.  They are available 24 hours a day.  You may also contact the Cardiology Clinic after hours number at [phone].                                                       Telephone Numbers [phone] Monday-Friday 8:00 am to 4:30 pm    [phone] After 4:30 pm Monday-Friday, Weekends & Holidays  Ask for Interventional Cardiologist on call. Someone is on call 24 hours/day   [facility] toll free number [phone] Monday-Friday 8:00 am to 4:30 pm   [facility] Emergency Dept [phone]

## 2020-03-10 NOTE — IP AVS SNAPSHOT
MRN:[id number]                      After Visit Summary   3/10/2020    Kecia Blue    MRN: [id number]           Visit Information        Department      3/10/2020  9:10 AM [address] [facility]          Review of your medicines      UNREVIEWED medicines. Ask your doctor about these medicines       Dose / Directions   albuterol (2.5 MG/3ML) 0.083% neb solution  Commonly known as:  PROVENTIL  Used for:  Lung replaced by transplant (H)      Dose:  2.5 mg  Take 1 vial (2.5 mg) by nebulization 2 times daily  Quantity:  180 mL  Refills:  3     amLODIPine 5 MG tablet  Commonly known as:  NORVASC      Dose:  10 mg  Take 2 tablets (10 mg) by mouth daily  Quantity:  60 tablet  Refills:  11     amoxicillin-clavulanate 500-125 MG tablet  Commonly known as:  AUGMENTIN  Used for:  Lung replaced by transplant (H)      Dose:  1 tablet  Take 1 tablet by mouth daily  Quantity:  14 tablet  Refills:  0     azithromycin 250 MG tablet  Commonly known as:  ZITHROMAX  Used for:  Lung replaced by transplant (H)      Dose:  250 mg  Take 1 tablet (250 mg) by mouth daily  Quantity:  14 tablet  Refills:  0     calcium carbonate 600 mg-vitamin D 400 units 600-400 MG-UNIT per tablet  Commonly known as:  CALTRATE  Used for:  S/P lung transplant (H), ILD (interstitial lung disease) (H), Lung transplant recipient (H), Encounter for long-term (current) use of high-risk medication, Anemia, unspecified type, Cytomegalovirus (CMV) viremia (H)      Dose:  1 tablet  Take 1 tablet by mouth daily  Refills:  0     carvedilol 25 MG tablet  Commonly known as:  COREG  Used for:  Essential hypertension      Dose:  25 mg  Take 1 tablet (25 mg) by mouth 2 times daily (with meals)  Quantity:  60 tablet  Refills:  11     dapsone 100 MG tablet  Commonly known as:  ACZONE  Used for:  S/P lung transplant (H), Administration of long-term prophylactic antibiotics      Dose:  100 mg  Take 1 tablet (100 mg) by mouth daily  Quantity:  30  tablet  Refills:  11     ferrous sulfate 325 (65 Fe) MG tablet  Commonly known as:  FEROSUL  Used for:  S/P lung transplant (H)      Dose:  325 mg  Take 1 tablet (325 mg) by mouth daily (with breakfast)  Quantity:  60 tablet  Refills:  2     magnesium oxide 400 MG tablet  Commonly known as:  MAG-OX      Dose:  400 mg  Take 400 mg by mouth daily  Refills:  0     multivitamin w/minerals tablet  Used for:  Lung transplant recipient (H)      Dose:  1 tablet  Take 1 tablet by mouth daily  Quantity:  30 each  Refills:  11     ondansetron 4 MG tablet  Commonly known as:  ZOFRAN  Used for:  Intractable vomiting with nausea, unspecified vomiting type      Dose:  4 mg  Take 1 tablet (4 mg) by mouth every 12 hours as needed for nausea  Quantity:  60 tablet  Refills:  0     pantoprazole 40 MG EC tablet  Commonly known as:  PROTONIX  Used for:  Gastroesophageal reflux disease without esophagitis, ILD (interstitial lung disease) (H), Lung transplant recipient (H)      Dose:  40 mg  Take 1 tablet (40 mg) by mouth 2 times daily  Quantity:  60 tablet  Refills:  11     predniSONE 2.5 MG tablet  Commonly known as:  DELTASONE  Used for:  Lung replaced by transplant (H)      Take two tablets (5mg) every AM and one tablet (2.5mg) every evening  Quantity:  90 tablet  Refills:  11     senna-docusate 8.6-50 MG tablet  Commonly known as:  SENOKOT-S/PERICOLACE  Used for:  Constipation, unspecified constipation type      Dose:  2 tablet  Take 2 tablets by mouth 2 times daily as needed for constipation  Quantity:  60 tablet  Refills:  0     sodium chloride 3 % neb solution  Commonly known as:  NEBUSAL  Used for:  Lung replaced by transplant (H)      Dose:  4 mL  Take 4 mLs by nebulization 2 times daily  Quantity:  240 mL  Refills:  3     * tacrolimus 1 MG capsule  Commonly known as:  GENERIC EQUIVALENT  Used for:  S/P lung transplant (H)      Dose:  1 mg  Take 1 capsule (1 mg) by mouth every morning Total dose: 1 mg in the AM and 0.5 mg in the  PM  Quantity:  30 capsule  Refills:  11     * tacrolimus 0.5 MG capsule  Commonly known as:  GENERIC EQUIVALENT  Used for:  S/P lung transplant (H)      Dose:  0.5 mg  Take 1 capsule (0.5 mg) by mouth every evening Total dose: 1 mg in the AM and 0.5 mg in the PM  Quantity:  30 capsule  Refills:  11     * voriconazole 200 MG tablet  Commonly known as:  VFEND  Used for:  Lung replaced by transplant (H)      Dose:  200 mg  Take 1 tablet (200 mg) by mouth 2 times daily TOTAL DOSE: 250 mg every 12 hours  Quantity:  60 tablet  Refills:  3     * voriconazole 50 MG tablet  Commonly known as:  VFEND  Used for:  Lung replaced by transplant (H)      Dose:  50 mg  Take 1 tablet (50 mg) by mouth 2 times daily TOTAL DOSE: 250 mg every 12 hours  Quantity:  60 tablet  Refills:  3         * This list has 4 medication(s) that are the same as other medications prescribed for you. Read the directions carefully, and ask your doctor or other care provider to review them with you.                  Protect others around you: Learn how to safely use, store and throw away your medicines at www.disposemymeds.org.       Follow-ups after your visit       Your next 10 appointments already scheduled    Mar 10, 2020  Procedure with Wai Garcia MD  [facility],  Heart Cath Lab ([facility]) [address] [id number] [phone]   The [facility] is located on the corner of [address] on the [facility]. It is easily accessible from virtually any point in the [facility] area, via [address] and [address].   Apr 08, 2020  7:00 AM CDT  CT CHEST W/O CONTRAST with UCCT2  [facility] CT ([facility]) [address]  1st Floor  [facility] [id number] [phone]   How do I prepare for my exam? (Food and drink instructions)  No Food and Drink Restrictions.    How do I prepare for  my exam? (Other instructions)  You do not need to do anything special to prepare for this exam.  For a sinus scan: Use your nose spray (nasal decongestant spray) as directed.    What should I wear: Please wear loose clothing, such as a sweat suit or jogging clothes. Avoid snaps, zippers and other metal. We may ask you to undress and put on a hospital gown.    How long does the exam take: Most scans take less than 20 minutes.    What should I bring: Please bring any scans or X-rays taken at other hospitals, if similar tests were done. Also bring a list of your medicines, including vitamins, minerals and over-the-counter drugs. It is safest to leave personal items at home.    Do I need a : No  is needed.    What do I need to tell my doctor?  Be sure to tell your doctor:  * If you have any allergies.  * If there s any chance you are pregnant.  * If you are breastfeeding.    What should I do after the exam: No restrictions, You may resume normal activities.    What is this test: A CT (computed tomography) scan is a series of pictures that allows us to look inside your body. The scanner creates images of the body in cross sections, much like slices of bread. This helps us see any problems more clearly.    Who should I call with questions: If you have any questions, please call the Imaging Department where you will have your exam. Directions, parking instructions, and other information is available on our website, [email or web address].     Apr 08, 2020  7:20 AM CDT  XR CHEST 2 VIEWS with UCXR1  [facility] Xray ([facility]) [address] [address] [phone]   How do I prepare for my exam? (Food and drink instructions)  No Food and Drink Restrictions.    How do I prepare for my exam? (Other instructions)  You do not need to do anything special for this exam.    What should I wear: Wear comfortable clothes.    How long does the exam  take: Most scans take less than 5 minutes.    What should I bring: Bring a list of your medicines, including vitamins, minerals and over-the-counter drugs. It is safest to leave personal items at home.    Do I need a :  No  is needed.    What do I need to tell my doctor: Tell your doctor if there s any chance you are pregnant.    What should I do after the exam: No restrictions, You may resume normal activities.    What is this test: An image of a specific body part shown in shades of black and white.    Who should I call with questions: If you have any questions, please call the Imaging Department where you will have your exam. Directions, parking instructions, and other information is available on our website, [email or web address].     Apr 08, 2020  7:45 AM CDT  Lab with  LAB  [facility] Lab ([facility]) [address] [place] [id number] [phone]      Apr 08, 2020  8:00 AM CDT  PFT VISIT with  PFL RENZO  [facility] Pulmonary Function Testing ([facility]) [address] [place] [id number] [phone]      Apr 08, 2020  8:40 AM CDT  (Arrive by 8:25 AM)  Return Lung Transplant with Srinivas Mcelroy MD  [facility] Solid Organ Transplant ([facility]) [address] [place] [id number] [phone]         Care Instructions       Further instructions from your care team       [facility]                        Interventional Cardiology  Discharge Instructions   Post Right Heart Cath      AFTER YOU GO HOME:    DO drink plenty of fluids    DO resume your regular diet and medications unless otherwise instructed by your Primary Physician    Do Not scrub the procedure site vigorously    No lotion or powder to the puncture site for 3 days    CALL YOUR PRIMARY PHYSICIAN IF: You may resume all normal activity.  Monitor neck site for bleeding,  swelling, or voice changes. If you notice bleeding or swelling immediately apply pressure to the site and call number below to speak with Cardiology Fellow.  If you experience any changes in your breathing you should call your doctor immediately or come to the closest Emergency Department.  Do not drive yourself.    ADDITIONAL INSTRUCTIONS: Medications: You are to resume all home medications including anticoagulation therapy unless otherwise advised by your primary cardiologist or nurse coordinator.    Follow Up: Per your primary cardiology team    If you have any questions or concerns regarding your procedure site please call [phone] at anytime and ask for Cardiology Fellow on call.  They are available 24 hours a day.  You may also contact the Cardiology Clinic after hours number at [phone].                                                       Telephone Numbers [phone] Monday-Friday 8:00 am to 4:30 pm    [phone] After 4:30 pm Monday-Friday, Weekends & Holidays  Ask for Interventional Cardiologist on call. Someone is on call 24 hours/day   [facility] toll free number [phone] Monday-Friday 8:00 am to 4:30 pm   [facility] Emergency Dept [phone]                   Additional Information About Your Visit       [employer] Information    [facility] gives you secure access to your electronic health record. If you see a primary care provider, you can also send messages to your care team and make appointments. If you have questions, please call your primary care clinic.  If you do not have a primary care provider, please call [phone] and they will assist you.       Care EveryWhere ID    This is your Care EveryWhere ID. This could be used by other organizations to access your [place] medical records  [id number]       Your Vitals Were  Most recent update: 3/10/2020 10:23 AM    Blood Pressure   108/67 (BP Location: Right arm, Cuff Size: Adult Regular)    Temperature   98.2  F (36.8  C) (Oral)     Respirations   16    Last Period   06/07/2014 (Exact Date)    Pulse Oximetry   96%          Primary Care Provider Office Phone # Fax #    Rosy Vu [phone]      Equal Access to Services    ALBAN VALENCIA : Sahara gomez sarai brigidoo Somachelleali, waaxda luqadaha, qaybta kaalmada adedaniella, morro bacon lalioneloina kelley. So [facility] [phone].    ATENCIÓN: Si habla español, tiene a taylor disposición servicios gratuitos de asistencia lingüística. Llame al [phone].    We comply with applicable federal and state civil rights laws, including the [place] Human Rights Act. We do not discriminate on the basis of race, color, creed, Spiritism, national origin, marital status, age, disability, sex, sexual orientation, or gender identity.       Thank you!    Thank you for choosing [facility] for your care. Our goal is always to provide you with excellent care. Hearing back from our patients is one way we can continue to improve our services. Please take a few minutes to complete the written survey that you may receive in the mail after you visit with us. Thank you!            Medication List      ASK your doctor about these medications          Morning Afternoon Evening Bedtime As Needed    albuterol (2.5 MG/3ML) 0.083% neb solution  Also known as:  PROVENTIL  INSTRUCTIONS:  Take 1 vial (2.5 mg) by nebulization 2 times daily                     amLODIPine 5 MG tablet  Also known as:  NORVASC  INSTRUCTIONS:  Take 2 tablets (10 mg) by mouth daily                     amoxicillin-clavulanate 500-125 MG tablet  Also known as:  AUGMENTIN  INSTRUCTIONS:  Take 1 tablet by mouth daily                     azithromycin 250 MG tablet  Also known as:  ZITHROMAX  INSTRUCTIONS:  Take 1 tablet (250 mg) by mouth daily                     calcium carbonate 600 mg-vitamin D 400 units 600-400 MG-UNIT per tablet  Also known as:  CALTRATE  INSTRUCTIONS:  Take 1 tablet by mouth daily                     carvedilol  25 MG tablet  Also known as:  COREG  INSTRUCTIONS:  Take 1 tablet (25 mg) by mouth 2 times daily (with meals)                     dapsone 100 MG tablet  Also known as:  ACZONE  INSTRUCTIONS:  Take 1 tablet (100 mg) by mouth daily                     ferrous sulfate 325 (65 Fe) MG tablet  Also known as:  FEROSUL  INSTRUCTIONS:  Take 1 tablet (325 mg) by mouth daily (with breakfast)                     magnesium oxide 400 MG tablet  Also known as:  MAG-OX  INSTRUCTIONS:  Take 400 mg by mouth daily                     multivitamin w/minerals tablet  INSTRUCTIONS:  Take 1 tablet by mouth daily                     ondansetron 4 MG tablet  Also known as:  ZOFRAN  INSTRUCTIONS:  Take 1 tablet (4 mg) by mouth every 12 hours as needed for nausea                     pantoprazole 40 MG EC tablet  Also known as:  PROTONIX  INSTRUCTIONS:  Take 1 tablet (40 mg) by mouth 2 times daily                     predniSONE 2.5 MG tablet  Also known as:  DELTASONE  INSTRUCTIONS:  Take two tablets (5mg) every AM and one tablet (2.5mg) every evening  Doctor's comments:  TXP DT 3/1/2018 (Lung) TXP Dischg DT 3/24/2018 DX Lung replaced by transplant Z94.2 TX Center [facility] ([place], [place])                     senna-docusate 8.6-50 MG tablet  Also known as:  SENOKOT-S/PERICOLACE  INSTRUCTIONS:  Take 2 tablets by mouth 2 times daily as needed for constipation                     sodium chloride 3 % neb solution  Also known as:  NEBUSAL  INSTRUCTIONS:  Take 4 mLs by nebulization 2 times daily                     * tacrolimus 1 MG capsule  Also known as:  GENERIC EQUIVALENT  INSTRUCTIONS:  Take 1 capsule (1 mg) by mouth every morning Total dose: 1 mg in the AM and 0.5 mg in the PM  Doctor's comments:  Dose change                     * tacrolimus 0.5 MG capsule  Also known as:  GENERIC EQUIVALENT  INSTRUCTIONS:  Take 1 capsule (0.5 mg) by mouth every evening Total dose: 1 mg in the AM and 0.5 mg in the PM                      * voriconazole 200 MG tablet  Also known as:  VFEND  INSTRUCTIONS:  Take 1 tablet (200 mg) by mouth 2 times daily TOTAL DOSE: 250 mg every 12 hours                     * voriconazole 50 MG tablet  Also known as:  VFEND  INSTRUCTIONS:  Take 1 tablet (50 mg) by mouth 2 times daily TOTAL DOSE: 250 mg every 12 hours                        * This list has 4 medication(s) that are the same as other medications prescribed for you. Read the directions carefully, and ask your doctor or other care provider to review them with you.

## 2020-03-10 NOTE — IP AVS SNAPSHOT
[address] [address]                                    After Visit Summary   3/10/2020    Kecia Blue    MRN: [id number]           After Visit Summary Signature Page    I have received my discharge instructions, and my questions have been answered. I have discussed any challenges I see with this plan with the nurse or doctor.    ..........................................................................................................................................  Patient/Patient Representative Signature      ..........................................................................................................................................  Patient Representative Print Name and Relationship to Patient    ..................................................               ................................................  Date                                   Time    ..........................................................................................................................................  Reviewed by Signature/Title    ...................................................              ..............................................  Date                                               Time          22EPIC Rev 08/18

## 2020-03-10 NOTE — RESULT ENCOUNTER NOTE
Armand Mcdonnell, your tacrolimus level was 7.7 at 12 hours on 3/9/2020 which is close to your goal range of 8-10. No dose change at this time. Please call the transplant office ([phone]) with any questions. Thanks, Mikayla

## 2020-03-10 NOTE — Clinical Note
Potential access sites were evaluated for patency using ultrasound.   The Right jugular vein was selected. Access was obtained under with Sonosite guidance using a standard 18 guage needle with direct visualization of needle entry.

## 2020-03-17 ENCOUNTER — TELEPHONE (OUTPATIENT)
Dept: TRANSPLANT | Facility: CLINIC | Age: 60
End: 2020-03-17

## 2020-03-17 NOTE — LETTER
PHYSICIAN ORDERS      DATE & TIME ISSUED: 2020 2:00 PM  PATIENT NAME: Kecia Blue   : 1960     [facility] MR# [if applicable]: [id number]     DIAGNOSIS:  Lung Transplant  Z94.2    Item Frequency   X CBC with platelets and Diff   Weekly     X CMP Weekly    X Magnesium Weekly    X  Tacrolimus/Prograf level  (Should be mailed to the [facility] in the  provided to you by the patient.) 12 hour trough level Weekly    X CMV DNA Quant   Weekly   X Voriconazole level Every 2 weeks     Any questions please call: Mikayla [phone]    Please fax these results to [phone].        Ame Chow PA-C

## 2020-03-17 NOTE — LETTER
PHYSICIAN ORDERS      DATE & TIME ISSUED: 2020 2:00 PM  PATIENT NAME: Kecia Blue   : 1960     [facility] MR# [if applicable]: [id number]     DIAGNOSIS:  Lung Transplant  Z94.2    Item Frequency   X CBC with platelets and Diff   Weekly     X CMP Weekly    X Magnesium Weekly    X  Tacrolimus/Prograf level  (Should be mailed to the [facility] in the  provided to you by the patient.) 12 hour trough level Weekly    X CMV DNA Quant   Weekly   X Voriconazole level Every 2 weeks     Any questions please call: Mikayla [phone]    Please fax these results to [phone].        Ame Chow PA-C

## 2020-03-17 NOTE — TELEPHONE ENCOUNTER
Spoke with patient, dialysis center has agreed to do patient's labs weekly so she can avoid another trip to the clinic. Orders faxed. Patient is self isolating other than dialysis runs.     The following instructions were provided to the patient:  The best prevention for avoiding all viral illness, including influenza and COVID-19, is thorough and frequent handwashing. You should avoid touching your face, nose, and mouth. Alcohol based hand sanitizers are also an effective method to clean your hands. For more information, the CDC website has numerous resources that are updated three times per week.

## 2020-03-17 NOTE — TELEPHONE ENCOUNTER
Patient Call: Voicemail  Date/Time: Mar 17, 2020 10:13:36 AM  Reason for call: Pt called regarding her moving her labs to her dialysis center please connect,

## 2020-03-17 NOTE — TELEPHONE ENCOUNTER
Patient Call: Voicemail  Date/Time: 3/17/2020  1014  Reason for call: pt stated will be getting labs at dialysis center  She will need more tacro kits mailed out-  Mailers sent to pt

## 2020-03-19 DIAGNOSIS — Z94.2 LUNG REPLACED BY TRANSPLANT (H): ICD-10-CM

## 2020-03-19 PROCEDURE — 80285 DRUG ASSAY VORICONAZOLE: CPT | Performed by: INTERNAL MEDICINE

## 2020-03-19 PROCEDURE — 80197 ASSAY OF TACROLIMUS: CPT | Performed by: INTERNAL MEDICINE

## 2020-03-21 LAB
TACROLIMUS BLD-MCNC: 10.3 UG/L (ref 5–15)
TME LAST DOSE: NORMAL H

## 2020-03-23 NOTE — RESULT ENCOUNTER NOTE
Armand Mcdonnell, your tacrolimus level was 10.3 at 12 hours on 3/19/2020 which is within your goal range of 8-10. No dose change at this time. Please call the transplant office ([phone]) with any questions. Thanks, Mikayla

## 2020-03-24 LAB — VORICONAZOLE SERPL-MCNC: 1.4 UG/ML (ref 1–5.5)

## 2020-03-25 ENCOUNTER — TELEPHONE (OUTPATIENT)
Dept: TRANSPLANT | Facility: CLINIC | Age: 60
End: 2020-03-25

## 2020-03-25 DIAGNOSIS — Z94.2 LUNG REPLACED BY TRANSPLANT (H): ICD-10-CM

## 2020-03-25 RX ORDER — VORICONAZOLE 200 MG/1
200 TABLET, FILM COATED ORAL 2 TIMES DAILY
Qty: 60 TABLET | Refills: 3 | Status: SHIPPED | OUTPATIENT
Start: 2020-03-25 | End: 2020-05-07

## 2020-03-25 RX ORDER — VORICONAZOLE 50 MG/1
50 TABLET, FILM COATED ORAL 2 TIMES DAILY
Qty: 60 TABLET | Refills: 3 | Status: SHIPPED | OUTPATIENT
Start: 2020-03-25 | End: 2020-05-07

## 2020-03-25 NOTE — TELEPHONE ENCOUNTER
Patient Call: Medication Refill  Route to LPN  Instruct the patient to first contact their pharmacy. If they have called their pharmacy and require further assistance, route to LPN.    Pharmacy Name:  [facility]   Pharmacy Location: [facility]  Name of Medication: Voriconazole Dose: 50 mg and 200 mg   When will the patient be out of this medication?: Less than 3 days (Route high priority)   Wants it mailed to her

## 2020-03-25 NOTE — TELEPHONE ENCOUNTER
Refills sent to  mail order with note to ship asap. Number given for patient to call them tomorrow morning if she has not heard anything, then to call me tomorrow if any issues.

## 2020-03-31 ENCOUNTER — TRANSFERRED RECORDS (OUTPATIENT)
Dept: HEALTH INFORMATION MANAGEMENT | Facility: CLINIC | Age: 60
End: 2020-03-31

## 2020-03-31 DIAGNOSIS — Z94.2 LUNG REPLACED BY TRANSPLANT (H): ICD-10-CM

## 2020-03-31 PROCEDURE — 80197 ASSAY OF TACROLIMUS: CPT | Performed by: INTERNAL MEDICINE

## 2020-04-02 ENCOUNTER — TELEPHONE (OUTPATIENT)
Dept: TRANSPLANT | Facility: CLINIC | Age: 60
End: 2020-04-02

## 2020-04-02 LAB
TACROLIMUS BLD-MCNC: 5.7 UG/L (ref 5–15)
TME LAST DOSE: NORMAL H

## 2020-04-02 NOTE — LETTER
PHYSICIAN ORDERS      DATE & TIME ISSUED: 2020 10:48 AM  PATIENT NAME: Kecia Blue   : 1960     [facility] MR# [if applicable]: [id number]     DIAGNOSIS:  Lung Transplant  Z94.2    Please check a Voriconazole level on 20 with other labs during dialysis. Thanks!    Any questions please call: [phone]    Please fax these results to [phone].        Ame Chow

## 2020-04-02 NOTE — RESULT ENCOUNTER NOTE
Tacrolimus level is 5.7 this check.  Kecia recently adjusted voriconazole dose waiting for a shipment so will affect tacrolimus level.  No dose change, and will redraw level next week with vori levels.  Kecia agrees and verbalized will implement.  Magaly

## 2020-04-02 NOTE — TELEPHONE ENCOUNTER
Spoke with patient on the phone. Writer wondering if she received her Voriconazole medication in the mail and patient confirmed that she had. Voriconazole level at 0.4 patient states there were 2 days in which she took half dose so she could ration her medication until receiving from mail order so pt believes this is why level is low. Faxed orders to Dialysis to check another vori level on Tuesday 4/7. Patient agreed with plan.

## 2020-04-02 NOTE — LETTER
PHYSICIAN ORDERS      DATE & TIME ISSUED: 2020 10:48 AM  PATIENT NAME: Kecia Blue   : 1960     [facility] MR# [if applicable]: [id number]     DIAGNOSIS:  Lung Transplant  Z94.2    Please check a Voriconazole level on 20 with other labs during dialysis. Thanks!    Any questions please call: [phone]    Please fax these results to [phone].        Ame Chow

## 2020-04-06 ENCOUNTER — TELEPHONE (OUTPATIENT)
Dept: TRANSPLANT | Facility: CLINIC | Age: 60
End: 2020-04-06

## 2020-04-06 NOTE — TELEPHONE ENCOUNTER
In light of the COVID 19 pandemic, The Solid [facility] Transplant [facility] cares about your safety and we are calling to convert your scheduled in-person clinic visit to a phone visit on *4/8.  The appointment will be on the same date and time.  What is the best number for the provider to call you at?  Video pt aware/[phone]

## 2020-04-07 ENCOUNTER — OFFICE VISIT (OUTPATIENT)
Dept: CARDIOLOGY | Facility: CLINIC | Age: 60
End: 2020-04-07

## 2020-04-07 ENCOUNTER — OFFICE VISIT (OUTPATIENT)
Dept: CARDIOLOGY | Facility: CLINIC | Age: 60
End: 2020-04-07
Payer: COMMERCIAL

## 2020-04-07 DIAGNOSIS — I27.20 PULMONARY HYPERTENSION (H): ICD-10-CM

## 2020-04-07 DIAGNOSIS — Z94.2 S/P LUNG TRANSPLANT (H): Primary | ICD-10-CM

## 2020-04-07 DIAGNOSIS — Z94.2 LUNG REPLACED BY TRANSPLANT (H): ICD-10-CM

## 2020-04-07 DIAGNOSIS — R06.02 SHORTNESS OF BREATH: ICD-10-CM

## 2020-04-07 DIAGNOSIS — K76.6 PORTAL HYPERTENSION (H): ICD-10-CM

## 2020-04-07 PROCEDURE — 80197 ASSAY OF TACROLIMUS: CPT | Performed by: INTERNAL MEDICINE

## 2020-04-07 PROCEDURE — 99207 ZZC NO CHARGE LOS: CPT | Mod: ZP | Performed by: INTERNAL MEDICINE

## 2020-04-07 ASSESSMENT — PAIN SCALES - GENERAL: PAINLEVEL: NO PAIN (0)

## 2020-04-07 NOTE — LETTER
4/7/2020      RE: Kecia R Mirza  [address] [place] [address]       Dear Colleague,    Thank you for the opportunity to participate in the care of your patient, Kecia Blue, at the [facility] at [facility]. Please see a copy of my visit note below.    Humberto Schaffer M.D.   Cardiovascular Medicine   Rosy Vu MD   [address] [place] [address] [phone] (Fax)     Carlton Black MBBS   [address] [place] [address] [phone] (Fax     Problem List     Family history of diabetes mellitus type II     Dermatomyositis     Lung interstitial disease     Dysplasia of cervix, low grade (ESTRADA 1)     BMI 20.0-20.9, adult     Pulmonary hypertension due to interstitial lung disease     Seronegative rheumatoid arthritis     Current use of steroid medication     Osteopenia of multiple sites     Gottron's papules     Lung transplant recipient     CMV (cytomegalovirus) antibody positive     Antisynthetase syndrome     GERD (gastroesophageal reflux disease)     Low magnesium level     Dehydration     Anemia of renal disease     Iron deficiency anemia secondary to inadequate dietary iron intake     ESRD (end stage renal disease)     Anemia in end-stage renal disease   Hypertension   Aspergillus empyema   Abnormal liver biopsy   ? Portal hypertension     History   The patient is a jeremy female with previous history of lung transplantation for dermatomyositis. She has a very long and complicated history with multiple [facility] physicians in [place], [place], and [place]. In late 2016 and 2017 she was suspected of having pulmonary hypertension as a complication of her underlying collagen vascular disease, however, right heart catherization (see below) demonstrated upper limit of normal PA pressure at rest with normal cardiac output and PVR of 2.5-3.0  depending on cardiac output method. In 2018 she was found to have severe pulmonary hypertension with substantial RV dysfunction and tricuspid reserve (see catherization below also). I can find no easy explanation for the change save a perfusion lung scan performed around that time was abnormal in the basilar segments but the presence of PE was not commented upon. She underwent successful lung transplantation albeit with multiple complications including empyema and acute becoming chronic dialysis dependent renal failure. Late last year and early this year developed recurrent ascites, evidence of portal hypertension, and biopsy demonstrating hepatic congestion and mild to moderate fibrosis. She subsequently underwent right heart catherization 2./2020 that demonstrated PA 33/18, PCP 15, RA 11. Interestingly, this is the highest RA that I could find in her chart. It should be noted that a coronary angiogram performed 2.2018 demonstrated evidence of non-obstructive coronary artery disease.     Discussion:  1. The chart evidence that I was able to find does not provide good evidence for portal hypertension secondary to cardiac disease in so far as right atrial pressure was normal in the evidence from 4331-5290.  Currently RA pressure of 11 is the highest one recorded.  2. The portal hypertension is not easily explained by right heart failure, pulmonary hypertension saga  3. There is no definite contraindication to transplantation but patient should probably have nuclear stress test in view of know CAD defined in 2018      Smoke: none   Surgery: lung transplant, empyema, dialysis access   Allergies: none       Current Outpatient Medications   Medication     albuterol (PROVENTIL) (2.5 MG/3ML) 0.083% neb solution     amLODIPine (NORVASC) 5 MG tablet     calcium-vitamin D (CALTRATE) 600-400 MG-UNIT per tablet     carvedilol (COREG) 25 MG tablet     dapsone (ACZONE) 100 MG tablet     ferrous sulfate (FEROSUL) 325 (65 Fe) MG  tablet     magnesium oxide (MAG-OX) 400 MG tablet     multivitamin, therapeutic with minerals (THERA-VIT-M) TABS tablet     ondansetron (ZOFRAN) 4 MG tablet     pantoprazole (PROTONIX) 40 MG EC tablet     predniSONE 2.5 MG PO tablet     senna-docusate (SENOKOT-S/PERICOLACE) 8.6-50 MG tablet     sodium chloride (NEBUSAL) 3 % neb solution     tacrolimus (GENERIC EQUIVALENT) 0.5 MG capsule     tacrolimus (GENERIC EQUIVALENT) 1 MG capsule     voriconazole (VFEND) 200 MG tablet     voriconazole (VFEND) 50 MG tablet     No current facility-administered medications for this visit.    General Symptoms: No   Skin Symptoms: No   HENT Symptoms: Yes   EYE SYMPTOMS: No   HEART SYMPTOMS: No   LUNG SYMPTOMS: Yes   INTESTINAL SYMPTOMS: No   URINARY SYMPTOMS: No   GYNECOLOGIC SYMPTOMS: No   BREAST SYMPTOMS: No   SKELETAL SYMPTOMS: No   BLOOD SYMPTOMS: No   NERVOUS SYSTEM SYMPTOMS: No   MENTAL HEALTH SYMPTOMS: No   Ear pain: No   Ear discharge: No   Tooth pain: No   Gum tenderness: No   Bleeding gums: No   Change in taste: No   Change in sense of smell: No   Dry mouth: No   Hearing aid used: No   Neck lump: No   Cough: Yes   Sputum or phlegm: Yes   Coughing up blood: No   Difficulty breating or shortness of breath: No   Snoring: No   Wheezing: Yes   Difficulty breathing on exertion: No   Nighttime Cough: Yes   Difficulty breathing when lying flat: No   .   Objective       Wt Readings from Last 5 Encounters:   03/10/20 54.9 kg (121 lb)   03/09/20 54.9 kg (121 lb)   02/19/20 54 kg (119 lb)   02/19/20 54 kg (119 lb)   02/19/20 54.2 kg (119 lb 6.4 oz)   Meds       Current Outpatient Medications   Medication     albuterol (PROVENTIL) (2.5 MG/3ML) 0.083% neb solution     amLODIPine (NORVASC) 5 MG tablet     calcium-vitamin D (CALTRATE) 600-400 MG-UNIT per tablet     carvedilol (COREG) 25 MG tablet     dapsone (ACZONE) 100 MG tablet     ferrous sulfate (FEROSUL) 325 (65 Fe) MG tablet     magnesium oxide (MAG-OX) 400 MG tablet      multivitamin, therapeutic with minerals (THERA-VIT-M) TABS tablet     ondansetron (ZOFRAN) 4 MG tablet     pantoprazole (PROTONIX) 40 MG EC tablet     predniSONE 2.5 MG PO tablet     senna-docusate (SENOKOT-S/PERICOLACE) 8.6-50 MG tablet     sodium chloride (NEBUSAL) 3 % neb solution     tacrolimus (GENERIC EQUIVALENT) 0.5 MG capsule     tacrolimus (GENERIC EQUIVALENT) 1 MG capsule     voriconazole (VFEND) 200 MG tablet     voriconazole (VFEND) 50 MG tablet     No current facility-administered medications for this visit.    Labs   Results for SARAI KILLIAN (MRN [id number]) as of 4/7/2020 14:43    Ref. Range 3/31/2020 11:44   Sodium (External) Latest Ref Range: 136 - 145 mmol/L 138   Potassium (External) Latest Ref Range: 3.5 - 5.1 mmol/L 4.2   Chloride (External) Latest Ref Range: 98 - 107 mmol/L 106   CO2 (External) Latest Ref Range: 20 - 31 mmol/L 21   Urea Nitrogen (External) Latest Ref Range: 7.0 - 26.0 mg/dL 72.0 (H)   Creatinine (External) Latest Ref Range: 0.55 - 1.02 mg/dL 3.75 (H)   GFR Estimated (External) Latest Ref Range: >=60 ml/min/1.73m2 13 (L)   Calcium (External) Latest Ref Range: 8.4 - 10.2 mg/dL 8.8   Anion Gap (External) Latest Ref Range: 6.0 - 14.0 mmol/L 15.2 (H)   Magnesium (External) Latest Ref Range: 1.6 - 2.6 mg/dL 1.6   Phosphorus (External) Latest Ref Range: 2.3 - 4.7 mg/dL 4.7   Albumin (External) Latest Ref Range: 3.5 - 5.0 g/dL 3.4 (L)   Protein Total (External) Latest Ref Range: 6.4 - 8.3 g/dL 7.4   Alk Phosphatase (External) Latest Ref Range: 40 - 150 U/L 474 (H)   ALT (External) Latest Ref Range: 0 - 55 U/L 32   AST (External) Latest Ref Range: 5 - 34 U/L 26   Bilirubin Direct (External) Latest Ref Range: 0.0 - 0.5 mg/dL 0.4   Bilirubin Total (External) Latest Ref Range: 0.2 - 1.2 mg/dL 0.7   BUN/Creatinine Ratio (External) Latest Ref Range: 10.00 - 30.00 ratio 19.20   Glucose (External) Latest Ref Range: 70 - 105 mg/dL 163 (H)   WBC Count (External) Latest Ref Range: 4.5 -  11.0 10(3)/uL 4.2 (L)   Hemoglobin (External) Latest Ref Range: 12.0 - 16.0 g/dL 10.3 (L)   Hematocrit (External) Latest Ref Range: 33.0 - 51.0 % 32.1 (L)   Platelet Count (External) Latest Ref Range: 150 - 450 10(3)/uL 116 (L)   RBC Count (External) Latest Ref Range: 4.00 - 5.20 10(6)/uL 3.29 (L)   MCV (External) Latest Ref Range: 80.0 - 100.0 fL 97.6   MCH (External) Latest Ref Range: 26.0 - 34.0 pg 31.3   MCHC (External) Latest Ref Range: 32.0 - 36.0 g/dL 32.1   RDW (External) Latest Ref Range: 11.5 - 13.0 % 16.2 (H)   % Neutrophils (External) Latest Ref Range: 35.0 - 77.0 % 63.3   % Lymphocytes (External) Latest Ref Range: 24.0 - 44.0 % 16.0 (L)   % Monocytes (External) Latest Ref Range: 3.0 - 6.0 % 18.6 (H)   % Eosinophils (External) Latest Ref Range: 0.0 - 3.0 % 1.4   % Basophils (External) Latest Ref Range: 0.0 - 1.0 % 0.5   % Immature Granulocytes (External) Latest Ref Range: 0.0 - 1.1 % 0.2   Absolute Immature Granulocytes (External) Latest Ref Range: 0.00 - 0.14 10(3)/uL 0.01   Absolute Basophils (External) Latest Ref Range: 0.0 - 0.1 10(3)/uL 0.0   Absolute Eosinophils (External) Latest Ref Range: 0.0 - 0.3 10(3)/uL 0.1   Absolute Lymphocytes (External) Latest Ref Range: 1.1 - 5.0 10(3)/uL 0.7 (L)   Absolute Monocytes (External) Latest Ref Range: 0.1 - 0.7 10(3)/uL 0.8 (H)   Absolute Neutrophils (External) Latest Ref Range: 1.5 - 8.5 10(3)/uL 2.7   Voriconizole (External) Latest Ref Range: 1.0 - 5.5 mcg/mL 0.4 (L)   Imaging   Echocardiogram   Name: SATTERLIE, SARAI R   MRN: [id number]   : 1960   Study Date: 10/23/2019 09:53 AM   Age: 58 yrs   Gender: Female   Patient Location: [facility]   Reason For Study: Heart Failure   Ordering Physician: NIYAH IRVIN   Performed By: MARTHA Montes   BSA: 1.7 m2   Height: 64 in   Weight: 148 lb   HR: 78   BP: 125/74 mmHg   _____________________________________________________________________________   __   Procedure   Echocardiogram with two-dimensional,  color and spectral Doppler performed.   _____________________________________________________________________________   __   Interpretation Summary   Left ventricular function, chamber size, wall motion, and wall thickness are normal.The EF is 60-65%.   Right ventricular function, chamber size, wall motion, and thickness are normal.   Mild to moderate tricuspid insufficiency is present.   Mild pulmonary hypertension is present.   Pulmonary artery systolic pressure is 48 mmHg (33 mmHg+RA pressure).   This study was compared with the study from 3/20/18: There has been no significant change.   _____________________________________________________________________________   __   Left Ventricle   Left ventricular function, chamber size, wall motion, and wall thickness are   normal.The EF is 60-65%. Left ventricular diastolic function is indeterminate.   Right Ventricle   Right ventricular function, chamber size, wall motion, and thickness are   normal.   Atria   The right atria appears normal. Mild left atrial enlargement is present. The   atrial septum is intact as assessed by color Doppler .   Mitral Valve   The mitral valve is normal. Mild mitral insufficiency is present.   Aortic Valve   Aortic valve is normal in structure and function. The aortic valve is   tricuspid.   Tricuspid Valve   Mild to moderate tricuspid insufficiency is present. Mild pulmonary   hypertension is present. Pulmonary artery systolic pressure is 48 mmHg (33   mmHg+RA pressure).   Vessels   The aorta root is normal. The pulmonary artery cannot be assessed. Dilation of   the inferior vena cava is present with abnormal respiratory variation in   diameter. IVC diameter >2.1 cm collapsing <50% with sniff suggests a high RA   pressure estimated at 15 mmHg or greater.   Pericardium   No pericardial effusion is present.   Miscellaneous   A left pleural effusion is present. Ascites is noted.   Compared to Previous Study   This study was compared with  the study from 3/20/18 . There has been no   significant change.   _____________________________________________________________________________   __   MMode/2D Measurements & Calculations   RVDd: 3.6 cm   IVSd: 1.1 cm   LVIDd: 4.1 cm   LVIDs: 2.9 cm   LVPWd: 0.87 cm   FS: 30.3 %   LV mass(C)d: 129.7 grams   LV mass(C)dI: 75.4 grams/m2   asc Aorta Diam: 3.0 cm   LVOT diam: 1.8 cm   LVOT area: 2.5 cm2   LA Volume Index (BP): 41.4 ml/m2   RWT: 0.42   TAPSE: 1.2 cm   Doppler Measurements & Calculations   MV E max herberth: 83.8 cm/sec   MV A max herberth: 78.5 cm/sec   MV E/A: 1.1   MV dec slope: 386.0 cm/sec2   MV dec time: 0.22 sec   TV max P.5 mmHg   PA acc time: 0.12 sec   TR max herberth: 285.0 cm/sec   TR max P.5 mmHg   E/E' av.2   Lateral E/e': 7.4   Medial E/e': 11.1   Echocardiogram 18   [facility]   Echocardiography Laboratory   [address] [place] [id number]   Name: SARAI KILLIAN   MRN: [id number]   : 1960   Study Date: 2018 04:27 PM   Age: 57 yrs   Gender: Female   Patient Location: [facility]   Reason For Study: SOB   Ordering Physician: HODAN BASURTO   Referring Physician: NIKUNJ ADAIR   Performed By: Geovany Aguilar RDCS   BSA: 1.5 m2   Height: 63 in   Weight: 113 lb   BP: 101/69 mmHg   _____________________________________________________________________________   __   Procedure   Complete Portable Echo Adult.   _____________________________________________________________________________   __   Interpretation Summary   The LV cavity is small and is underfilled. The Ejection Fraction is estimated   at 65-70%. Flattened septum is consistent with right ventricular pressure and   volume overload.   The RV free wall is severely hypokinetic with some preserved contractility of   the RV apex. This is consistent with McConnels sign and may be concerning for   acute pulmonary embolism in the right clinical setting.   There is lack of coaptation  of the tricuspid leaflets due to severe annular   dilation. Right ventricular systolic pressure is 88.79mmHg above the right   atrial pressure. Moderate tricuspid insufficiency is present.   Small, 1.6cm pericardial effusion primarily localized anterior to the RV free   wall.   Compared to prior study on 2018, RV is more dilated, RV function has   worsened and estimated PA pressure is higher. Patient is intubated at the time   of the study.     Catherization:2020           Catherization 2018       Catherization [facility]    CARDIAC CATHETERIZATION REPORT      PATIENT DATA  Name: Kecia Blue  Date: 2017  : 1960  Sex: F  Record # [id number]      PRE OPERATIVE DIAGNOSIS: Pulmonary hypertension.     POST OPERATIVE DIAGNOSIS: Borderline pulmonary hypertension.     PRIMARY PROVIDER: Rosy Vu MD    ASSISTANTS: None.     CARDIOLOGIST PERFORMING STUDY: Pierce Martinez MD    TYPE OF PROCEDURE: Ultrasound guided right heart catheterization.     TECHNIQUE: Access was obtained in the right internal jugular vein using ultrasound guidance and a 6 Persian Fast-Cath sheath was inserted and a 6 Persian pulmonary artery catheter was used for routine right heart catheterization pressure measurements. There were no immediate complications from the procedure. Thermodilution and Valdemar methods were used for cardiac output.     SPECIMENS REMOVED: None.     ESTIMATED BLOOD LOSS: Nil.     INTRA OPERATIVE COMPLICATIONS: None.     HEMODYNAMIC DATA: Right atrial pressure is 3 mmHg, pulmonary pressure 40/13 with a mean of 25 mmHg. Pulmonary capillary wedge pressure 10 mmHg, pulmonary sat 79%, aortic sat 100%. Valdemar cardiac output 4.9. Cardiac index 3.0. Thermodilution cardiac output 6.6. Cardiac index 4.1. Pulmonary vascular resistance 3 Wood units.     CONCLUSION:  1) Borderline pulmonary hypertension with mildly increased pulmonary vascular resistance.     2) Normal left and right ventricular filling  pressure.     3) Normal cardiac output.    Complex case with extensive record and image review review from multiple hospitals     120 minutes.          cc: Rosy Vu MD      Please do not hesitate to contact me if you have any questions/concerns.     Sincerely,     Humberto Schaffer MD

## 2020-04-07 NOTE — PROGRESS NOTES
"Kecia Blue is a 59 year old female who is being evaluated via a billable video visit.      The patient has been notified of following:     \"This video visit will be conducted via a call between you and your physician/provider. We have found that certain health care needs can be provided without the need for an in-person physical exam.  This service lets us provide the care you need with a video conversation.  If a prescription is necessary we can send it directly to your pharmacy.  If lab work is needed we can place an order for that and you can then stop by our lab to have the test done at a later time.    Video visits are billed at different rates depending on your insurance coverage.  Please reach out to your insurance provider with any questions.    If during the course of the call the physician/provider feels a video visit is not appropriate, you will not be charged for this service.\"    Patient has given verbal consent for Video visit? Yes    Patient would like the video invitation sent by: Send to e-mail at: [email or web address]    Video Start Time: 8:45am    Kecia Blue complains of    Chief Complaint   Patient presents with     Video Visit     follow-up S/P Lung Transplant        I have reviewed and updated the patient's Past Medical History, Social History, Family History and Medication List.    ALLERGIES  Patient has no known allergies.    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 This has been complicated by riht main bronchial stenosis that was treated with dilation, most recently in 3/2019 She is currently on 2 drug immune suppression (tacrolimus and prednisone) because of hx of leukopenia, liver dysfunction, " "elevated alk phos and recurrent infections (CMV, aspergillus).    #  ID prophylaxis includes dapsone for PJP which was started during her 10/2019 hospitalization (G6PD level is normal). Will decrease dose to 50 mg po every day if okayed by ID>    #  Productive cough with accompanying upper respiratory symptoms, evaluated at 3/9/20 as detailed in Dr. Christensen's note from that date. Symptoms resolved after treatment with azithro and augmentin. Has also been taking albuterol nebs bid-->recommended that she transition to albuterol MDI to see if this is as effective as neb.    #  Pulmonary hypertension.  Pre-[facility] transplant PA presssure was 98/45, mean 61 mm hg.  However, right heart cath on 3/10/20 showed only mild PAH, with PAP 33/18, mean 24.    #  Ascites and portal hypertension, initially thought to be related to chronic right-sided heart pressures on basis of liver biopsy. Perhaps this is \"residual\" disease\" related to hx prior congestion with decompensation when she develped ascites.    #  History of left-sided Aspergillus empyema, which was first noted on 10/08/2019.  Pleural effusion has nearly resolved (not enough left to safely tap), based on Based on CT chest on 3/9/20--although cannot excluded residual IFI based on imaging. Per Transplant ID note of 12/18/19 and subsequent communication on EMR, plan to increase vori dose to 250 mg bid (currently at 200 bid with subtherapeutic level recently), check vori level in 2 weeks, and plan on another 3 months of treatment.    #  History of CMV viremia.  Her most recent CMV PCR was on 02/11/2020 and was below the detectable level in their lab of 35 copies per mL.  Most recent blood CMV PCR was <137 cpml (3/9/20)    #  History of EBV viremia, with most recent result 8,918 cpml (3/9/20).    #  Dialysis-dependent renal failure.  She reports good urine output but her dialysis team is dubious that her kidneys will improved. Pt understands that she may need continued " "hemodialysis or change to PD and/or be evaluated for possible kidney transplant.    #  History of hypertension, being treated with amlodipine, carvedilol and Imdur.      Please also refer to RN Transplant Coordinator note for additional information related to this visit.    PATIENT PROFILE AND TRANSPLANT HISTORY:  Current age:                  59 year old  Underlying lung disease: IIP: Nonspecific Interstitial Pneumonia    Transplant date(s):        3/1/2018 (Lung)  Transplant POD(s):        769  Lung transplant type(s): Bilateral sequential      Transplant coordinator:   Mikayla Latham  Transplant provider(s):   Ame Servin    ALLERGIES/INTOLERANCES/SENSITIVITIES  Patient has no known allergies.    Active Patient Thresholds     Lab Low High Effective Since Comment    Tacrolimus Level 8 10 06/06/2019 6/6/19 CP          INTERVAL HISTORY:  --Most recent resulted PRA: 2/19/20, ne for DSA    --Most recent lung transplant clinic visit: 3/9/2020 (Christensen)    --Interval clinic visits, test results, signif medical events (obtained by chart review and patient hx):    3/10/20:  --Right heart catheterization  --Right sided filling pressures are mildly elevated  --Mild elevated Pulmonary Hypertension.  --Left sided filling pressures are mildly elevated.  --Normal cardiac output level.    --Today:    Ms. Blue reports that, overall, she has been doing quite well recently.  She is not having shortness of breath at rest or with her daily activities.  She is not having productive cough, hemoptysis, chest pain.  She continues to use albuterol nebs twice a day and thinks they have helped her chest congestion.    REVIEW OF SYSTEMS:    #She has not required paracentesis for the past 2 months.  She is also not having peripheral edema    #She continues 3 times a week hemodialysis and is tolerating her runs well    #Her appetite is \"reasonable\"    #She checks her vitals twice a day and has not had a fever.  " "She also has not had signs of systemic illness.    #Complete review of systems was asked and was otherwise negative.      I have reviewed and updated the patient's Past Medical History, Social History, Family History and Medication List.      ATTESTATION    Kecia Blue is a 59 year old female who is having lung transplant follow-up via a billable video visit.  Also on the \"call\" were the patient's , Ranjan; and transplant coordinator Mikayla Latham RN.    I have reviewed the note as documented above.  This accurately captures the substance of my conversation with the patient.    Video-Visit Details    Type of service:  Video Visit    Video Start Time (time video started): 8:50am    Video End Time (time video stopped): 9:25am    Originating Location (pt. Location): Home    Distant Location (provider location):  [facility] TRANSPL[facility]     Mode of Communication:  Video Conference via LUXA      "

## 2020-04-07 NOTE — LETTER
4/7/2020      RE: Kecia R Mirza  [address] [place] [address]       Dear Colleague,    Thank you for the opportunity to participate in the care of your patient, Kecia Blue, at the [facility]. Please see a copy of my visit note below.    Humberto Schaffer M.D.  Cardiovascular Medicine    Rosy Vu MD    [address] [place] [address] [phone] (Fax)    Carlton Black MBBS    [address] [place] [address] [phone] (Fax        Problem List    Family history of diabetes mellitus type II     Dermatomyositis     Lung interstitial disease     Dysplasia of cervix, low grade (ESTRADA 1)     BMI 20.0-20.9, adult     Pulmonary hypertension due to interstitial lung disease     Seronegative rheumatoid arthritis     Current use of steroid medication      Osteopenia of multiple sites     Gottron's papules     Lung transplant recipient     CMV (cytomegalovirus) antibody positive     Antisynthetase syndrome     GERD (gastroesophageal reflux disease)     Low magnesium level     Dehydration     Anemia of renal disease     Iron deficiency anemia secondary to inadequate dietary iron intake     ESRD (end stage renal disease)     Anemia in end-stage renal disease    Hypertension     Aspergillus empyema    Abnormal liver biopsy    ? Portal hypertension    History    The patient is a jeremy female with previous history of lung transplantation for dermatomyositis.  She has a very long and complicated history with multiple [place] physicians in [place], [place], and [place].  In late 2016 and 2017 she was suspected of having pulmonary hypertension as a complication of her underlying collagen vascular disease, however, right heart catherization (see below) demonstrated upper limit of normal PA pressure at rest with normal cardiac output and PVR of  2.5-3.0 depending on cardiac output method.  In 2018 she was found to have severe pulmonary hypertension with substantial RV dysfunction and tricuspid reserve (see catherization below also).  I can find no easy explanation for the change save a perfusion lung scan performed around that time was abnormal in the basilar segments but the presence of PE was not commented upon.  She underwent successful lung transplantation albeit with multiple complications including empyema and acute becoming chronic dialysis dependent renal failure.  Late last year and early this year developed recurrent ascites, evidence of portal hypertension, and biopsy demonstrating hepatic congestion and mild to moderate fibrosis.  She subsequently underwent right heart catherization 2./2020 that demonstrated PA 33/18, PCP 15, RA 11.  Interestingly, this is the highest RA that I could find in her chart.  It should be noted that a coronary angiogram performed 2.2018 demonstrated evidence of non-obstructive coronary artery disease.      Smoke: none  Surgery: lung transplant, empyema, dialysis access  Allergies: none    Current Outpatient Medications   Medication     albuterol (PROVENTIL) (2.5 MG/3ML) 0.083% neb solution     amLODIPine (NORVASC) 5 MG tablet     calcium-vitamin D (CALTRATE) 600-400 MG-UNIT per tablet     carvedilol (COREG) 25 MG tablet     dapsone (ACZONE) 100 MG tablet     ferrous sulfate (FEROSUL) 325 (65 Fe) MG tablet     magnesium oxide (MAG-OX) 400 MG tablet     multivitamin, therapeutic with minerals (THERA-VIT-M) TABS tablet     ondansetron (ZOFRAN) 4 MG tablet     pantoprazole (PROTONIX) 40 MG EC tablet     predniSONE 2.5 MG PO tablet     senna-docusate (SENOKOT-S/PERICOLACE) 8.6-50 MG tablet     sodium chloride (NEBUSAL) 3 % neb solution     tacrolimus (GENERIC EQUIVALENT) 0.5 MG capsule     tacrolimus (GENERIC EQUIVALENT) 1 MG capsule     voriconazole (VFEND) 200 MG tablet     voriconazole (VFEND) 50 MG tablet     No  current facility-administered medications for this visit.        General Symptoms: No  Skin Symptoms: No  HENT Symptoms: Yes  EYE SYMPTOMS: No  HEART SYMPTOMS: No  LUNG SYMPTOMS: Yes  INTESTINAL SYMPTOMS: No  URINARY SYMPTOMS: No  GYNECOLOGIC SYMPTOMS: No  BREAST SYMPTOMS: No  SKELETAL SYMPTOMS: No  BLOOD SYMPTOMS: No  NERVOUS SYSTEM SYMPTOMS: No  MENTAL HEALTH SYMPTOMS: No  Ear pain: No  Ear discharge: No  Tooth pain: No  Gum tenderness: No  Bleeding gums: No  Change in taste: No  Change in sense of smell: No  Dry mouth: No  Hearing aid used: No  Neck lump: No  Cough: Yes  Sputum or phlegm: Yes  Coughing up blood: No  Difficulty breating or shortness of breath: No  Snoring: No  Wheezing: Yes  Difficulty breathing on exertion: No  Nighttime Cough: Yes  Difficulty breathing when lying flat: No  .    Objective    Wt Readings from Last 5 Encounters:   03/10/20 54.9 kg (121 lb)   03/09/20 54.9 kg (121 lb)   02/19/20 54 kg (119 lb)   02/19/20 54 kg (119 lb)   02/19/20 54.2 kg (119 lb 6.4 oz)       Meds  Current Outpatient Medications   Medication     albuterol (PROVENTIL) (2.5 MG/3ML) 0.083% neb solution     amLODIPine (NORVASC) 5 MG tablet     calcium-vitamin D (CALTRATE) 600-400 MG-UNIT per tablet     carvedilol (COREG) 25 MG tablet     dapsone (ACZONE) 100 MG tablet     ferrous sulfate (FEROSUL) 325 (65 Fe) MG tablet     magnesium oxide (MAG-OX) 400 MG tablet     multivitamin, therapeutic with minerals (THERA-VIT-M) TABS tablet     ondansetron (ZOFRAN) 4 MG tablet     pantoprazole (PROTONIX) 40 MG EC tablet     predniSONE 2.5 MG PO tablet     senna-docusate (SENOKOT-S/PERICOLACE) 8.6-50 MG tablet     sodium chloride (NEBUSAL) 3 % neb solution     tacrolimus (GENERIC EQUIVALENT) 0.5 MG capsule     tacrolimus (GENERIC EQUIVALENT) 1 MG capsule     voriconazole (VFEND) 200 MG tablet     voriconazole (VFEND) 50 MG tablet     No current facility-administered medications for this visit.          Labs  Results for MARIA D,  SARAI VILLAFANA (MRN [id number]) as of 4/7/2020 14:43   Ref. Range 3/31/2020 11:44   Sodium (External) Latest Ref Range: 136 - 145 mmol/L 138   Potassium (External) Latest Ref Range: 3.5 - 5.1 mmol/L 4.2   Chloride (External) Latest Ref Range: 98 - 107 mmol/L 106   CO2 (External) Latest Ref Range: 20 - 31 mmol/L 21   Urea Nitrogen (External) Latest Ref Range: 7.0 - 26.0 mg/dL 72.0 (H)   Creatinine (External) Latest Ref Range: 0.55 - 1.02 mg/dL 3.75 (H)   GFR Estimated (External) Latest Ref Range: >=60 ml/min/1.73m2 13 (L)   Calcium (External) Latest Ref Range: 8.4 - 10.2 mg/dL 8.8   Anion Gap (External) Latest Ref Range: 6.0 - 14.0 mmol/L 15.2 (H)   Magnesium (External) Latest Ref Range: 1.6 - 2.6 mg/dL 1.6   Phosphorus (External) Latest Ref Range: 2.3 - 4.7 mg/dL 4.7   Albumin (External) Latest Ref Range: 3.5 - 5.0 g/dL 3.4 (L)   Protein Total (External) Latest Ref Range: 6.4 - 8.3 g/dL 7.4   Alk Phosphatase (External) Latest Ref Range: 40 - 150 U/L 474 (H)   ALT (External) Latest Ref Range: 0 - 55 U/L 32   AST (External) Latest Ref Range: 5 - 34 U/L 26   Bilirubin Direct (External) Latest Ref Range: 0.0 - 0.5 mg/dL 0.4   Bilirubin Total (External) Latest Ref Range: 0.2 - 1.2 mg/dL 0.7   BUN/Creatinine Ratio (External) Latest Ref Range: 10.00 - 30.00 ratio 19.20   Glucose (External) Latest Ref Range: 70 - 105 mg/dL 163 (H)   WBC Count (External) Latest Ref Range: 4.5 - 11.0 10(3)/uL 4.2 (L)   Hemoglobin (External) Latest Ref Range: 12.0 - 16.0 g/dL 10.3 (L)   Hematocrit (External) Latest Ref Range: 33.0 - 51.0 % 32.1 (L)   Platelet Count (External) Latest Ref Range: 150 - 450 10(3)/uL 116 (L)   RBC Count (External) Latest Ref Range: 4.00 - 5.20 10(6)/uL 3.29 (L)   MCV (External) Latest Ref Range: 80.0 - 100.0 fL 97.6   MCH (External) Latest Ref Range: 26.0 - 34.0 pg 31.3   MCHC (External) Latest Ref Range: 32.0 - 36.0 g/dL 32.1   RDW (External) Latest Ref Range: 11.5 - 13.0 % 16.2 (H)   % Neutrophils (External) Latest Ref  Range: 35.0 - 77.0 % 63.3   % Lymphocytes (External) Latest Ref Range: 24.0 - 44.0 % 16.0 (L)   % Monocytes (External) Latest Ref Range: 3.0 - 6.0 % 18.6 (H)   % Eosinophils (External) Latest Ref Range: 0.0 - 3.0 % 1.4   % Basophils (External) Latest Ref Range: 0.0 - 1.0 % 0.5   % Immature Granulocytes (External) Latest Ref Range: 0.0 - 1.1 % 0.2   Absolute Immature Granulocytes (External) Latest Ref Range: 0.00 - 0.14 10(3)/uL 0.01   Absolute Basophils (External) Latest Ref Range: 0.0 - 0.1 10(3)/uL 0.0   Absolute Eosinophils (External) Latest Ref Range: 0.0 - 0.3 10(3)/uL 0.1   Absolute Lymphocytes (External) Latest Ref Range: 1.1 - 5.0 10(3)/uL 0.7 (L)   Absolute Monocytes (External) Latest Ref Range: 0.1 - 0.7 10(3)/uL 0.8 (H)   Absolute Neutrophils (External) Latest Ref Range: 1.5 - 8.5 10(3)/uL 2.7   Voriconizole (External) Latest Ref Range: 1.0 - 5.5 mcg/mL 0.4 (L)       Imaging     Echocardiogram    Name: SARAI KILLIAN  MRN: [id number]  : 1960  Study Date: 10/23/2019 09:53 AM  Age: 58 yrs  Gender: Female  Patient Location: [facility]  Reason For Study: Heart Failure  Ordering Physician: NIYAH IRVIN  Performed By: MARTHA Montes     BSA: 1.7 m2  Height: 64 in  Weight: 148 lb  HR: 78  BP: 125/74 mmHg  _____________________________________________________________________________  __        Procedure  Echocardiogram with two-dimensional, color and spectral Doppler performed.  _____________________________________________________________________________  __        Interpretation Summary  Left ventricular function, chamber size, wall motion, and wall thickness are normal.The EF is 60-65%.  Right ventricular function, chamber size, wall motion, and thickness are normal.  Mild to moderate tricuspid insufficiency is present.  Mild pulmonary hypertension is present.  Pulmonary artery systolic pressure is 48 mmHg (33 mmHg+RA pressure).  This study was compared with the study from 3/20/18: There has  been no significant change.  _____________________________________________________________________________  __        Left Ventricle  Left ventricular function, chamber size, wall motion, and wall thickness are  normal.The EF is 60-65%. Left ventricular diastolic function is indeterminate.     Right Ventricle  Right ventricular function, chamber size, wall motion, and thickness are  normal.     Atria  The right atria appears normal. Mild left atrial enlargement is present. The  atrial septum is intact as assessed by color Doppler .     Mitral Valve  The mitral valve is normal. Mild mitral insufficiency is present.        Aortic Valve  Aortic valve is normal in structure and function. The aortic valve is  tricuspid.     Tricuspid Valve  Mild to moderate tricuspid insufficiency is present. Mild pulmonary  hypertension is present. Pulmonary artery systolic pressure is 48 mmHg (33  mmHg+RA pressure).     Vessels  The aorta root is normal. The pulmonary artery cannot be assessed. Dilation of  the inferior vena cava is present with abnormal respiratory variation in  diameter. IVC diameter >2.1 cm collapsing <50% with sniff suggests a high RA  pressure estimated at 15 mmHg or greater.     Pericardium  No pericardial effusion is present.     Miscellaneous  A left pleural effusion is present. Ascites is noted.        Compared to Previous Study  This study was compared with the study from 3/20/18 . There has been no  significant change.  _____________________________________________________________________________  __  MMode/2D Measurements & Calculations     RVDd: 3.6 cm  IVSd: 1.1 cm  LVIDd: 4.1 cm  LVIDs: 2.9 cm  LVPWd: 0.87 cm  FS: 30.3 %  LV mass(C)d: 129.7 grams  LV mass(C)dI: 75.4 grams/m2  asc Aorta Diam: 3.0 cm  LVOT diam: 1.8 cm  LVOT area: 2.5 cm2  LA Volume Index (BP): 41.4 ml/m2  RWT: 0.42  TAPSE: 1.2 cm           Doppler Measurements & Calculations  MV E max herberth: 83.8 cm/sec  MV A max herberth: 78.5 cm/sec  MV E/A:  1.1  MV dec slope: 386.0 cm/sec2  MV dec time: 0.22 sec  TV max P.5 mmHg  PA acc time: 0.12 sec  TR max herberth: 285.0 cm/sec  TR max P.5 mmHg  E/E' av.2  Lateral E/e': 7.4  Medial E/e': 11.1      Echocardiogram 18    [facility]  Echocardiography Laboratory  [address] [place] [address]     Name: SARAI KILLIAN  MRN: [id number]  : 1960  Study Date: 2018 04:27 PM  Age: 57 yrs  Gender: Female  Patient Location: [facility]  Reason For Study: SOB  Ordering Physician: HODAN BASURTO  Referring Physician: NIKUNJ ADAIR  Performed By: Geovany Aguilar RDCS     BSA: 1.5 m2  Height: 63 in  Weight: 113 lb  BP: 101/69 mmHg  _____________________________________________________________________________  __        Procedure  Complete Portable Echo Adult.  _____________________________________________________________________________  __        Interpretation Summary  The LV cavity is small and is underfilled. The Ejection Fraction is estimated  at 65-70%. Flattened septum is consistent with right ventricular pressure and  volume overload.  The RV free wall is severely hypokinetic with some preserved contractility of  the RV apex. This is consistent with McConnels sign and may be concerning for  acute pulmonary embolism in the right clinical setting.  There is lack of coaptation of the tricuspid leaflets due to severe annular  dilation. Right ventricular systolic pressure is 88.79mmHg above the right  atrial pressure. Moderate tricuspid insufficiency is present.  Small, 1.6cm pericardial effusion primarily localized anterior to the RV free  wall.     Compared to prior study on 2018, RV is more dilated, RV function has  worsened and estimated PA pressure is higher. Patient is intubated at the time  of the study.    Catherization:2020          Catherization 2018          Catherization [facility] 2017    CARDIAC CATHETERIZATION REPORT      PATIENT  DATA  Name:  Kecia Blue  Date:  2017  :  1960  Sex:  F  Record #  [id number]      PRE OPERATIVE DIAGNOSIS:  Pulmonary hypertension.      POST OPERATIVE DIAGNOSIS:  Borderline pulmonary hypertension.      PRIMARY PROVIDER:    Rosy Vu MD    ASSISTANTS:  None.     CARDIOLOGIST PERFORMING STUDY:     Pierce Martinez MD    TYPE OF PROCEDURE:   Ultrasound guided right heart catheterization.      TECHNIQUE:    Access was obtained in the right internal jugular vein using ultrasound guidance and a 6 [place] Fast-Cath sheath was inserted and a 6 [place] pulmonary artery catheter was used for routine right heart catheterization pressure measurements.    There were no immediate complications from the procedure. Thermodilution and Valdemar methods were used for cardiac output.      SPECIMENS REMOVED:  None.      ESTIMATED BLOOD LOSS:  Nil.      INTRA OPERATIVE COMPLICATIONS:  None.      HEMODYNAMIC DATA:    Right atrial pressure is 3 mmHg, pulmonary pressure 40/13 with a mean of 25 mmHg.  Pulmonary capillary wedge pressure 10 mmHg, pulmonary sat 79%, aortic sat 100%.  Valdemar cardiac output 4.9.  Cardiac index 3.0.  Thermodilution cardiac output 6.6.  Cardiac index 4.1.  Pulmonary vascular resistance 3 Wood units.       CONCLUSION:  1)  Borderline pulmonary hypertension with mildly increased pulmonary vascular resistance.      2)  Normal left and right ventricular filling pressure.      3)  Normal cardiac output.       Please do not hesitate to contact me if you have any questions/concerns.     Sincerely,     Humberto Schaffer MD    cc: Rosy Vu MD

## 2020-04-07 NOTE — PROGRESS NOTES
Humberto Schaffer M.D.   Cardiovascular Medicine   [facility]   [address] [place] [address] [phone] (Fax)     Carlton Black MBBS   [address] [place] [address] [phone] (Fax     Problem List     Family history of diabetes mellitus type II     Dermatomyositis     Lung interstitial disease     Dysplasia of cervix, low grade (ESTRADA 1)     BMI 20.0-20.9, adult     Pulmonary hypertension due to interstitial lung disease     Seronegative rheumatoid arthritis     Current use of steroid medication     Osteopenia of multiple sites     Gottron's papules     Lung transplant recipient     CMV (cytomegalovirus) antibody positive     Antisynthetase syndrome     GERD (gastroesophageal reflux disease)     Low magnesium level     Dehydration     Anemia of renal disease     Iron deficiency anemia secondary to inadequate dietary iron intake     ESRD (end stage renal disease)     Anemia in end-stage renal disease   Hypertension   Aspergillus empyema   Abnormal liver biopsy   ? Portal hypertension     History   The patient is a jeremy female with previous history of lung transplantation for dermatomyositis. She has a very long and complicated history with multiple fine physicians in [place], [place], and [place]. In late 2016 and 2017 she was suspected of having pulmonary hypertension as a complication of her underlying collagen vascular disease, however, right heart catherization (see below) demonstrated upper limit of normal PA pressure at rest with normal cardiac output and PVR of 2.5-3.0 depending on cardiac output method. In 2018 she was found to have severe pulmonary hypertension with substantial RV dysfunction and tricuspid reserve (see catherization below also). I can find no easy explanation for the change save a perfusion lung scan performed around that time was abnormal in the basilar segments but the presence  of PE was not commented upon. She underwent successful lung transplantation albeit with multiple complications including empyema and acute becoming chronic dialysis dependent renal failure. Late last year and early this year developed recurrent ascites, evidence of portal hypertension, and biopsy demonstrating hepatic congestion and mild to moderate fibrosis. She subsequently underwent right heart catherization 2./2020 that demonstrated PA 33/18, PCP 15, RA 11. Interestingly, this is the highest RA that I could find in her chart. It should be noted that a coronary angiogram performed 2.2018 demonstrated evidence of non-obstructive coronary artery disease.     Discussion:  1. The chart evidence that I was able to find does not provide good evidence for portal hypertension secondary to cardiac disease in so far as right atrial pressure was normal in the evidence from 4943-5005.  Currently RA pressure of 11 is the highest one recorded.  2. The portal hypertension is not easily explained by right heart failure, pulmonary hypertension saga  3. There is no definite contraindication to transplantation but patient should probably have nuclear stress test in view of know CAD defined in 2018      Smoke: none   Surgery: lung transplant, empyema, dialysis access   Allergies: none       Current Outpatient Medications   Medication     albuterol (PROVENTIL) (2.5 MG/3ML) 0.083% neb solution     amLODIPine (NORVASC) 5 MG tablet     calcium-vitamin D (CALTRATE) 600-400 MG-UNIT per tablet     carvedilol (COREG) 25 MG tablet     dapsone (ACZONE) 100 MG tablet     ferrous sulfate (FEROSUL) 325 (65 Fe) MG tablet     magnesium oxide (MAG-OX) 400 MG tablet     multivitamin, therapeutic with minerals (THERA-VIT-M) TABS tablet     ondansetron (ZOFRAN) 4 MG tablet     pantoprazole (PROTONIX) 40 MG EC tablet     predniSONE 2.5 MG PO tablet     senna-docusate (SENOKOT-S/PERICOLACE) 8.6-50 MG tablet     sodium chloride (NEBUSAL) 3 % neb solution      tacrolimus (GENERIC EQUIVALENT) 0.5 MG capsule     tacrolimus (GENERIC EQUIVALENT) 1 MG capsule     voriconazole (VFEND) 200 MG tablet     voriconazole (VFEND) 50 MG tablet     No current facility-administered medications for this visit.    General Symptoms: No   Skin Symptoms: No   HENT Symptoms: Yes   EYE SYMPTOMS: No   HEART SYMPTOMS: No   LUNG SYMPTOMS: Yes   INTESTINAL SYMPTOMS: No   URINARY SYMPTOMS: No   GYNECOLOGIC SYMPTOMS: No   BREAST SYMPTOMS: No   SKELETAL SYMPTOMS: No   BLOOD SYMPTOMS: No   NERVOUS SYSTEM SYMPTOMS: No   MENTAL HEALTH SYMPTOMS: No   Ear pain: No   Ear discharge: No   Tooth pain: No   Gum tenderness: No   Bleeding gums: No   Change in taste: No   Change in sense of smell: No   Dry mouth: No   Hearing aid used: No   Neck lump: No   Cough: Yes   Sputum or phlegm: Yes   Coughing up blood: No   Difficulty breating or shortness of breath: No   Snoring: No   Wheezing: Yes   Difficulty breathing on exertion: No   Nighttime Cough: Yes   Difficulty breathing when lying flat: No   .   Objective       Wt Readings from Last 5 Encounters:   03/10/20 54.9 kg (121 lb)   03/09/20 54.9 kg (121 lb)   02/19/20 54 kg (119 lb)   02/19/20 54 kg (119 lb)   02/19/20 54.2 kg (119 lb 6.4 oz)   Meds       Current Outpatient Medications   Medication     albuterol (PROVENTIL) (2.5 MG/3ML) 0.083% neb solution     amLODIPine (NORVASC) 5 MG tablet     calcium-vitamin D (CALTRATE) 600-400 MG-UNIT per tablet     carvedilol (COREG) 25 MG tablet     dapsone (ACZONE) 100 MG tablet     ferrous sulfate (FEROSUL) 325 (65 Fe) MG tablet     magnesium oxide (MAG-OX) 400 MG tablet     multivitamin, therapeutic with minerals (THERA-VIT-M) TABS tablet     ondansetron (ZOFRAN) 4 MG tablet     pantoprazole (PROTONIX) 40 MG EC tablet     predniSONE 2.5 MG PO tablet     senna-docusate (SENOKOT-S/PERICOLACE) 8.6-50 MG tablet     sodium chloride (NEBUSAL) 3 % neb solution     tacrolimus (GENERIC EQUIVALENT) 0.5 MG capsule      tacrolimus (GENERIC EQUIVALENT) 1 MG capsule     voriconazole (VFEND) 200 MG tablet     voriconazole (VFEND) 50 MG tablet     No current facility-administered medications for this visit.    Labs   Results for SARAI KILLIAN (MRN [id number]) as of 4/7/2020 14:43    Ref. Range 3/31/2020 11:44   Sodium (External) Latest Ref Range: 136 - 145 mmol/L 138   Potassium (External) Latest Ref Range: 3.5 - 5.1 mmol/L 4.2   Chloride (External) Latest Ref Range: 98 - 107 mmol/L 106   CO2 (External) Latest Ref Range: 20 - 31 mmol/L 21   Urea Nitrogen (External) Latest Ref Range: 7.0 - 26.0 mg/dL 72.0 (H)   Creatinine (External) Latest Ref Range: 0.55 - 1.02 mg/dL 3.75 (H)   GFR Estimated (External) Latest Ref Range: >=60 ml/min/1.73m2 13 (L)   Calcium (External) Latest Ref Range: 8.4 - 10.2 mg/dL 8.8   Anion Gap (External) Latest Ref Range: 6.0 - 14.0 mmol/L 15.2 (H)   Magnesium (External) Latest Ref Range: 1.6 - 2.6 mg/dL 1.6   Phosphorus (External) Latest Ref Range: 2.3 - 4.7 mg/dL 4.7   Albumin (External) Latest Ref Range: 3.5 - 5.0 g/dL 3.4 (L)   Protein Total (External) Latest Ref Range: 6.4 - 8.3 g/dL 7.4   Alk Phosphatase (External) Latest Ref Range: 40 - 150 U/L 474 (H)   ALT (External) Latest Ref Range: 0 - 55 U/L 32   AST (External) Latest Ref Range: 5 - 34 U/L 26   Bilirubin Direct (External) Latest Ref Range: 0.0 - 0.5 mg/dL 0.4   Bilirubin Total (External) Latest Ref Range: 0.2 - 1.2 mg/dL 0.7   BUN/Creatinine Ratio (External) Latest Ref Range: 10.00 - 30.00 ratio 19.20   Glucose (External) Latest Ref Range: 70 - 105 mg/dL 163 (H)   WBC Count (External) Latest Ref Range: 4.5 - 11.0 10(3)/uL 4.2 (L)   Hemoglobin (External) Latest Ref Range: 12.0 - 16.0 g/dL 10.3 (L)   Hematocrit (External) Latest Ref Range: 33.0 - 51.0 % 32.1 (L)   Platelet Count (External) Latest Ref Range: 150 - 450 10(3)/uL 116 (L)   RBC Count (External) Latest Ref Range: 4.00 - 5.20 10(6)/uL 3.29 (L)   MCV (External) Latest Ref Range: 80.0 -  100.0 fL 97.6   MCH (External) Latest Ref Range: 26.0 - 34.0 pg 31.3   MCHC (External) Latest Ref Range: 32.0 - 36.0 g/dL 32.1   RDW (External) Latest Ref Range: 11.5 - 13.0 % 16.2 (H)   % Neutrophils (External) Latest Ref Range: 35.0 - 77.0 % 63.3   % Lymphocytes (External) Latest Ref Range: 24.0 - 44.0 % 16.0 (L)   % Monocytes (External) Latest Ref Range: 3.0 - 6.0 % 18.6 (H)   % Eosinophils (External) Latest Ref Range: 0.0 - 3.0 % 1.4   % Basophils (External) Latest Ref Range: 0.0 - 1.0 % 0.5   % Immature Granulocytes (External) Latest Ref Range: 0.0 - 1.1 % 0.2   Absolute Immature Granulocytes (External) Latest Ref Range: 0.00 - 0.14 10(3)/uL 0.01   Absolute Basophils (External) Latest Ref Range: 0.0 - 0.1 10(3)/uL 0.0   Absolute Eosinophils (External) Latest Ref Range: 0.0 - 0.3 10(3)/uL 0.1   Absolute Lymphocytes (External) Latest Ref Range: 1.1 - 5.0 10(3)/uL 0.7 (L)   Absolute Monocytes (External) Latest Ref Range: 0.1 - 0.7 10(3)/uL 0.8 (H)   Absolute Neutrophils (External) Latest Ref Range: 1.5 - 8.5 10(3)/uL 2.7   Voriconizole (External) Latest Ref Range: 1.0 - 5.5 mcg/mL 0.4 (L)   Imaging   Echocardiogram   Name: SARAI KILLIAN   MRN: [id number]   : 1960   Study Date: 10/23/2019 09:53 AM   Age: 58 yrs   Gender: Female   Patient [facility]: [facility]   Reason For Study: Heart Failure   Ordering Physician: NIYAH IRVIN   Performed By: MARTHA Montes   BSA: 1.7 m2   Height: 64 in   Weight: 148 lb   HR: 78   BP: 125/74 mmHg   _____________________________________________________________________________   __   Procedure   Echocardiogram with two-dimensional, color and spectral Doppler performed.   _____________________________________________________________________________   __   Interpretation Summary   Left ventricular function, chamber size, wall motion, and wall thickness are normal.The EF is 60-65%.   Right ventricular function, chamber size, wall motion, and thickness are normal.   Mild  to moderate tricuspid insufficiency is present.   Mild pulmonary hypertension is present.   Pulmonary artery systolic pressure is 48 mmHg (33 mmHg+RA pressure).   This study was compared with the study from 3/20/18: There has been no significant change.   _____________________________________________________________________________   __   Left Ventricle   Left ventricular function, chamber size, wall motion, and wall thickness are   normal.The EF is 60-65%. Left ventricular diastolic function is indeterminate.   Right Ventricle   Right ventricular function, chamber size, wall motion, and thickness are   normal.   Atria   The right atria appears normal. Mild left atrial enlargement is present. The   atrial septum is intact as assessed by color Doppler .   Mitral Valve   The mitral valve is normal. Mild mitral insufficiency is present.   Aortic Valve   Aortic valve is normal in structure and function. The aortic valve is   tricuspid.   Tricuspid Valve   Mild to moderate tricuspid insufficiency is present. Mild pulmonary   hypertension is present. Pulmonary artery systolic pressure is 48 mmHg (33   mmHg+RA pressure).   Vessels   The aorta root is normal. The pulmonary artery cannot be assessed. Dilation of   the inferior vena cava is present with abnormal respiratory variation in   diameter. IVC diameter >2.1 cm collapsing <50% with sniff suggests a high RA   pressure estimated at 15 mmHg or greater.   Pericardium   No pericardial effusion is present.   Miscellaneous   A left pleural effusion is present. Ascites is noted.   Compared to Previous Study   This study was compared with the study from 3/20/18 . There has been no   significant change.   _____________________________________________________________________________   __   MMode/2D Measurements & Calculations   RVDd: 3.6 cm   IVSd: 1.1 cm   LVIDd: 4.1 cm   LVIDs: 2.9 cm   LVPWd: 0.87 cm   FS: 30.3 %   LV mass(C)d: 129.7 grams   LV mass(C)dI: 75.4 grams/m2   asc  Aorta Diam: 3.0 cm   LVOT diam: 1.8 cm   LVOT area: 2.5 cm2   LA Volume Index (BP): 41.4 ml/m2   RWT: 0.42   TAPSE: 1.2 cm   Doppler Measurements & Calculations   MV E max herberth: 83.8 cm/sec   MV A max herberth: 78.5 cm/sec   MV E/A: 1.1   MV dec slope: 386.0 cm/sec2   MV dec time: 0.22 sec   TV max P.5 mmHg   PA acc time: 0.12 sec   TR max herberth: 285.0 cm/sec   TR max P.5 mmHg   E/E' av.2   Lateral E/e': 7.4   Medial E/e': 11.1   Echocardiogram 18   [facility],[place]   Echocardiography Laboratory   [address] [place] [id number]   Name: SARAI KILLIAN   MRN: [id number]   : 1960   Study Date: 2018 04:27 PM   Age: 57 yrs   Gender: Female   Patient Location: [facility]   Reason For Study: SOB   Ordering Physician: HODAN BASURTO   Referring Physician: NIKUNJ ADAIR   Performed By: Geoavny Aguilar RDCS   BSA: 1.5 m2   Height: 63 in   Weight: 113 lb   BP: 101/69 mmHg   _____________________________________________________________________________   __   Procedure   Complete Portable Echo Adult.   _____________________________________________________________________________   __   Interpretation Summary   The LV cavity is small and is underfilled. The Ejection Fraction is estimated   at 65-70%. Flattened septum is consistent with right ventricular pressure and   volume overload.   The RV free wall is severely hypokinetic with some preserved contractility of   the RV apex. This is consistent with McConnels sign and may be concerning for   acute pulmonary embolism in the right clinical setting.   There is lack of coaptation of the tricuspid leaflets due to severe annular   dilation. Right ventricular systolic pressure is 88.79mmHg above the right   atrial pressure. Moderate tricuspid insufficiency is present.   Small, 1.6cm pericardial effusion primarily localized anterior to the RV free   wall.   Compared to prior study on 2018, RV is more dilated, RV  function has   worsened and estimated PA pressure is higher. Patient is intubated at the time   of the study.     Catherization:2020           Catherization 2018       Catherization [place]    CARDIAC CATHETERIZATION REPORT      PATIENT DATA  Name: Kecia Blue  Date: 2017  : 1960  Sex: F  Record # [id number]      PRE OPERATIVE DIAGNOSIS: Pulmonary hypertension.     POST OPERATIVE DIAGNOSIS: Borderline pulmonary hypertension.     PRIMARY PROVIDER: Rosy Vu MD    ASSISTANTS: None.     CARDIOLOGIST PERFORMING STUDY: Pierce Martinez MD    TYPE OF PROCEDURE: Ultrasound guided right heart catheterization.     TECHNIQUE: Access was obtained in the right internal jugular vein using ultrasound guidance and a 6 Syriac Fast-Cath sheath was inserted and a 6 Syriac pulmonary artery catheter was used for routine right heart catheterization pressure measurements. There were no immediate complications from the procedure. Thermodilution and Valdemar methods were used for cardiac output.     SPECIMENS REMOVED: None.     ESTIMATED BLOOD LOSS: Nil.     INTRA OPERATIVE COMPLICATIONS: None.     HEMODYNAMIC DATA: Right atrial pressure is 3 mmHg, pulmonary pressure 40/13 with a mean of 25 mmHg. Pulmonary capillary wedge pressure 10 mmHg, pulmonary sat 79%, aortic sat 100%. Valdemar cardiac output 4.9. Cardiac index 3.0. Thermodilution cardiac output 6.6. Cardiac index 4.1. Pulmonary vascular resistance 3 Wood units.     CONCLUSION:  1) Borderline pulmonary hypertension with mildly increased pulmonary vascular resistance.     2) Normal left and right ventricular filling pressure.     3) Normal cardiac output.    Complex case with extensive record and image review review from multiple hospitals     120 minutes.          cc: Rosy Vu MD

## 2020-04-07 NOTE — PROGRESS NOTES
Humberto Schaffer M.D.  Cardiovascular Medicine    [facility]icks [facility]    [address] [place] [address] [phone] (Fax)    Carlton Black MBBS    [address] [place] [address] [phone] (Fax        Problem List    Family history of diabetes mellitus type II     Dermatomyositis     Lung interstitial disease     Dysplasia of cervix, low grade (ESTRADA 1)     BMI 20.0-20.9, adult     Pulmonary hypertension due to interstitial lung disease     Seronegative rheumatoid arthritis     Current use of steroid medication      Osteopenia of multiple sites     Gottron's papules     Lung transplant recipient     CMV (cytomegalovirus) antibody positive     Antisynthetase syndrome     GERD (gastroesophageal reflux disease)     Low magnesium level     Dehydration     Anemia of renal disease     Iron deficiency anemia secondary to inadequate dietary iron intake     ESRD (end stage renal disease)     Anemia in end-stage renal disease    Hypertension     Aspergillus empyema    Abnormal liver biopsy    ? Portal hypertension    History    The patient is a jeremy female with previous history of lung transplantation for dermatomyositis.  She has a very long and complicated history with multiple [facility] physicians in [place], [place], and [place].  In late 2016 and 2017 she was suspected of having pulmonary hypertension as a complication of her underlying collagen vascular disease, however, right heart catherization (see below) demonstrated upper limit of normal PA pressure at rest with normal cardiac output and PVR of 2.5-3.0 depending on cardiac output method.  In 2018 she was found to have severe pulmonary hypertension with substantial RV dysfunction and tricuspid reserve (see catherization below also).  I can find no easy explanation for the change save a perfusion lung scan performed around that time was abnormal in the basilar  segments but the presence of PE was not commented upon.  She underwent successful lung transplantation albeit with multiple complications including empyema and acute becoming chronic dialysis dependent renal failure.  Late last year and early this year developed recurrent ascites, evidence of portal hypertension, and biopsy demonstrating hepatic congestion and mild to moderate fibrosis.  She subsequently underwent right heart catherization 2./2020 that demonstrated PA 33/18, PCP 15, RA 11.  Interestingly, this is the highest RA that I could find in her chart.  It should be noted that a coronary angiogram performed 2.2018 demonstrated evidence of non-obstructive coronary artery disease.      Smoke: none  Surgery: lung transplant, empyema, dialysis access  Allergies: none    Current Outpatient Medications   Medication     albuterol (PROVENTIL) (2.5 MG/3ML) 0.083% neb solution     amLODIPine (NORVASC) 5 MG tablet     calcium-vitamin D (CALTRATE) 600-400 MG-UNIT per tablet     carvedilol (COREG) 25 MG tablet     dapsone (ACZONE) 100 MG tablet     ferrous sulfate (FEROSUL) 325 (65 Fe) MG tablet     magnesium oxide (MAG-OX) 400 MG tablet     multivitamin, therapeutic with minerals (THERA-VIT-M) TABS tablet     ondansetron (ZOFRAN) 4 MG tablet     pantoprazole (PROTONIX) 40 MG EC tablet     predniSONE 2.5 MG PO tablet     senna-docusate (SENOKOT-S/PERICOLACE) 8.6-50 MG tablet     sodium chloride (NEBUSAL) 3 % neb solution     tacrolimus (GENERIC EQUIVALENT) 0.5 MG capsule     tacrolimus (GENERIC EQUIVALENT) 1 MG capsule     voriconazole (VFEND) 200 MG tablet     voriconazole (VFEND) 50 MG tablet     No current facility-administered medications for this visit.        General Symptoms: No  Skin Symptoms: No  HENT Symptoms: Yes  EYE SYMPTOMS: No  HEART SYMPTOMS: No  LUNG SYMPTOMS: Yes  INTESTINAL SYMPTOMS: No  URINARY SYMPTOMS: No  GYNECOLOGIC SYMPTOMS: No  BREAST SYMPTOMS: No  SKELETAL SYMPTOMS: No  BLOOD SYMPTOMS:  No  NERVOUS SYSTEM SYMPTOMS: No  MENTAL HEALTH SYMPTOMS: No  Ear pain: No  Ear discharge: No  Tooth pain: No  Gum tenderness: No  Bleeding gums: No  Change in taste: No  Change in sense of smell: No  Dry mouth: No  Hearing aid used: No  Neck lump: No  Cough: Yes  Sputum or phlegm: Yes  Coughing up blood: No  Difficulty breating or shortness of breath: No  Snoring: No  Wheezing: Yes  Difficulty breathing on exertion: No  Nighttime Cough: Yes  Difficulty breathing when lying flat: No  .    Objective    Wt Readings from Last 5 Encounters:   03/10/20 54.9 kg (121 lb)   03/09/20 54.9 kg (121 lb)   02/19/20 54 kg (119 lb)   02/19/20 54 kg (119 lb)   02/19/20 54.2 kg (119 lb 6.4 oz)       Meds  Current Outpatient Medications   Medication     albuterol (PROVENTIL) (2.5 MG/3ML) 0.083% neb solution     amLODIPine (NORVASC) 5 MG tablet     calcium-vitamin D (CALTRATE) 600-400 MG-UNIT per tablet     carvedilol (COREG) 25 MG tablet     dapsone (ACZONE) 100 MG tablet     ferrous sulfate (FEROSUL) 325 (65 Fe) MG tablet     magnesium oxide (MAG-OX) 400 MG tablet     multivitamin, therapeutic with minerals (THERA-VIT-M) TABS tablet     ondansetron (ZOFRAN) 4 MG tablet     pantoprazole (PROTONIX) 40 MG EC tablet     predniSONE 2.5 MG PO tablet     senna-docusate (SENOKOT-S/PERICOLACE) 8.6-50 MG tablet     sodium chloride (NEBUSAL) 3 % neb solution     tacrolimus (GENERIC EQUIVALENT) 0.5 MG capsule     tacrolimus (GENERIC EQUIVALENT) 1 MG capsule     voriconazole (VFEND) 200 MG tablet     voriconazole (VFEND) 50 MG tablet     No current facility-administered medications for this visit.          Labs  Results for SARAI KILLIAN BRODERICK (MRN [id number]) as of 4/7/2020 14:43   Ref. Range 3/31/2020 11:44   Sodium (External) Latest Ref Range: 136 - 145 mmol/L 138   Potassium (External) Latest Ref Range: 3.5 - 5.1 mmol/L 4.2   Chloride (External) Latest Ref Range: 98 - 107 mmol/L 106   CO2 (External) Latest Ref Range: 20 - 31 mmol/L 21   Urea  Nitrogen (External) Latest Ref Range: 7.0 - 26.0 mg/dL 72.0 (H)   Creatinine (External) Latest Ref Range: 0.55 - 1.02 mg/dL 3.75 (H)   GFR Estimated (External) Latest Ref Range: >=60 ml/min/1.73m2 13 (L)   Calcium (External) Latest Ref Range: 8.4 - 10.2 mg/dL 8.8   Anion Gap (External) Latest Ref Range: 6.0 - 14.0 mmol/L 15.2 (H)   Magnesium (External) Latest Ref Range: 1.6 - 2.6 mg/dL 1.6   Phosphorus (External) Latest Ref Range: 2.3 - 4.7 mg/dL 4.7   Albumin (External) Latest Ref Range: 3.5 - 5.0 g/dL 3.4 (L)   Protein Total (External) Latest Ref Range: 6.4 - 8.3 g/dL 7.4   Alk Phosphatase (External) Latest Ref Range: 40 - 150 U/L 474 (H)   ALT (External) Latest Ref Range: 0 - 55 U/L 32   AST (External) Latest Ref Range: 5 - 34 U/L 26   Bilirubin Direct (External) Latest Ref Range: 0.0 - 0.5 mg/dL 0.4   Bilirubin Total (External) Latest Ref Range: 0.2 - 1.2 mg/dL 0.7   BUN/Creatinine Ratio (External) Latest Ref Range: 10.00 - 30.00 ratio 19.20   Glucose (External) Latest Ref Range: 70 - 105 mg/dL 163 (H)   WBC Count (External) Latest Ref Range: 4.5 - 11.0 10(3)/uL 4.2 (L)   Hemoglobin (External) Latest Ref Range: 12.0 - 16.0 g/dL 10.3 (L)   Hematocrit (External) Latest Ref Range: 33.0 - 51.0 % 32.1 (L)   Platelet Count (External) Latest Ref Range: 150 - 450 10(3)/uL 116 (L)   RBC Count (External) Latest Ref Range: 4.00 - 5.20 10(6)/uL 3.29 (L)   MCV (External) Latest Ref Range: 80.0 - 100.0 fL 97.6   MCH (External) Latest Ref Range: 26.0 - 34.0 pg 31.3   MCHC (External) Latest Ref Range: 32.0 - 36.0 g/dL 32.1   RDW (External) Latest Ref Range: 11.5 - 13.0 % 16.2 (H)   % Neutrophils (External) Latest Ref Range: 35.0 - 77.0 % 63.3   % Lymphocytes (External) Latest Ref Range: 24.0 - 44.0 % 16.0 (L)   % Monocytes (External) Latest Ref Range: 3.0 - 6.0 % 18.6 (H)   % Eosinophils (External) Latest Ref Range: 0.0 - 3.0 % 1.4   % Basophils (External) Latest Ref Range: 0.0 - 1.0 % 0.5   % Immature Granulocytes (External)  Latest Ref Range: 0.0 - 1.1 % 0.2   Absolute Immature Granulocytes (External) Latest Ref Range: 0.00 - 0.14 10(3)/uL 0.01   Absolute Basophils (External) Latest Ref Range: 0.0 - 0.1 10(3)/uL 0.0   Absolute Eosinophils (External) Latest Ref Range: 0.0 - 0.3 10(3)/uL 0.1   Absolute Lymphocytes (External) Latest Ref Range: 1.1 - 5.0 10(3)/uL 0.7 (L)   Absolute Monocytes (External) Latest Ref Range: 0.1 - 0.7 10(3)/uL 0.8 (H)   Absolute Neutrophils (External) Latest Ref Range: 1.5 - 8.5 10(3)/uL 2.7   Voriconizole (External) Latest Ref Range: 1.0 - 5.5 mcg/mL 0.4 (L)       Imaging     Echocardiogram    Name: SARAI KILLIAN  MRN: [id number]  : 1960  Study Date: 10/23/2019 09:53 AM  Age: 58 yrs  Gender: Female  Patient Location: [facility]  Reason For Study: Heart Failure  Ordering Physician: NIYAH IRVIN  Performed By: MARTHA Montes     BSA: 1.7 m2  Height: 64 in  Weight: 148 lb  HR: 78  BP: 125/74 mmHg  _____________________________________________________________________________  __        Procedure  Echocardiogram with two-dimensional, color and spectral Doppler performed.  _____________________________________________________________________________  __        Interpretation Summary  Left ventricular function, chamber size, wall motion, and wall thickness are normal.The EF is 60-65%.  Right ventricular function, chamber size, wall motion, and thickness are normal.  Mild to moderate tricuspid insufficiency is present.  Mild pulmonary hypertension is present.  Pulmonary artery systolic pressure is 48 mmHg (33 mmHg+RA pressure).  This study was compared with the study from 3/20/18: There has been no significant change.  _____________________________________________________________________________  __        Left Ventricle  Left ventricular function, chamber size, wall motion, and wall thickness are  normal.The EF is 60-65%. Left ventricular diastolic function is indeterminate.     Right  Ventricle  Right ventricular function, chamber size, wall motion, and thickness are  normal.     Atria  The right atria appears normal. Mild left atrial enlargement is present. The  atrial septum is intact as assessed by color Doppler .     Mitral Valve  The mitral valve is normal. Mild mitral insufficiency is present.        Aortic Valve  Aortic valve is normal in structure and function. The aortic valve is  tricuspid.     Tricuspid Valve  Mild to moderate tricuspid insufficiency is present. Mild pulmonary  hypertension is present. Pulmonary artery systolic pressure is 48 mmHg (33  mmHg+RA pressure).     Vessels  The aorta root is normal. The pulmonary artery cannot be assessed. Dilation of  the inferior vena cava is present with abnormal respiratory variation in  diameter. IVC diameter >2.1 cm collapsing <50% with sniff suggests a high RA  pressure estimated at 15 mmHg or greater.     Pericardium  No pericardial effusion is present.     Miscellaneous  A left pleural effusion is present. Ascites is noted.        Compared to Previous Study  This study was compared with the study from 3/20/18 . There has been no  significant change.  _____________________________________________________________________________  __  MMode/2D Measurements & Calculations     RVDd: 3.6 cm  IVSd: 1.1 cm  LVIDd: 4.1 cm  LVIDs: 2.9 cm  LVPWd: 0.87 cm  FS: 30.3 %  LV mass(C)d: 129.7 grams  LV mass(C)dI: 75.4 grams/m2  asc Aorta Diam: 3.0 cm  LVOT diam: 1.8 cm  LVOT area: 2.5 cm2  LA Volume Index (BP): 41.4 ml/m2  RWT: 0.42  TAPSE: 1.2 cm           Doppler Measurements & Calculations  MV E max herberth: 83.8 cm/sec  MV A max herberth: 78.5 cm/sec  MV E/A: 1.1  MV dec slope: 386.0 cm/sec2  MV dec time: 0.22 sec  TV max P.5 mmHg  PA acc time: 0.12 sec  TR max herberth: 285.0 cm/sec  TR max P.5 mmHg  E/E' av.2  Lateral E/e': 7.4  Medial E/e': 11.1      Echocardiogram 18    [facility]  Echocardiography  Laboratory  [address] [place] [address]     Name: SARAI KILLIAN  MRN: [id number]  : 1960  Study Date: 2018 04:27 PM  Age: 57 yrs  Gender: Female  Patient Location: [facility]  Reason For Study: SOB  Ordering Physician: HODAN BASURTO  Referring Physician: NIKUNJ ADAIR  Performed By: Geovany Aguilar RDCS     BSA: 1.5 m2  Height: 63 in  Weight: 113 lb  BP: 101/69 mmHg  _____________________________________________________________________________  __        Procedure  Complete Portable Echo Adult.  _____________________________________________________________________________  __        Interpretation Summary  The LV cavity is small and is underfilled. The Ejection Fraction is estimated  at 65-70%. Flattened septum is consistent with right ventricular pressure and  volume overload.  The RV free wall is severely hypokinetic with some preserved contractility of  the RV apex. This is consistent with McConnels sign and may be concerning for  acute pulmonary embolism in the right clinical setting.  There is lack of coaptation of the tricuspid leaflets due to severe annular  dilation. Right ventricular systolic pressure is 88.79mmHg above the right  atrial pressure. Moderate tricuspid insufficiency is present.  Small, 1.6cm pericardial effusion primarily localized anterior to the RV free  wall.     Compared to prior study on 2018, RV is more dilated, RV function has  worsened and estimated PA pressure is higher. Patient is intubated at the time  of the study.    Catherization:2020          Catherization 2018          Catherization [facility] 2017    CARDIAC CATHETERIZATION REPORT      PATIENT DATA  Name:  Sarai Killian  Date:  2017  :  1960  Sex:  F  Record #  [id number]      PRE OPERATIVE DIAGNOSIS:  Pulmonary hypertension.      POST OPERATIVE DIAGNOSIS:  Borderline pulmonary hypertension.      PRIMARY PROVIDER:    Rosy Vu MD    ASSISTANTS:  None.      CARDIOLOGIST PERFORMING STUDY:     Pierce Martinez MD    TYPE OF PROCEDURE:   Ultrasound guided right heart catheterization.      TECHNIQUE:    Access was obtained in the right internal jugular vein using ultrasound guidance and a 6 [place] citizen Fast-Cath sheath was inserted and a 6 [place] citizen pulmonary artery catheter was used for routine right heart catheterization pressure measurements.    There were no immediate complications from the procedure. Thermodilution and Valdemar methods were used for cardiac output.      SPECIMENS REMOVED:  None.      ESTIMATED BLOOD LOSS:  Nil.      INTRA OPERATIVE COMPLICATIONS:  None.      HEMODYNAMIC DATA:    Right atrial pressure is 3 mmHg, pulmonary pressure 40/13 with a mean of 25 mmHg.  Pulmonary capillary wedge pressure 10 mmHg, pulmonary sat 79%, aortic sat 100%.  Valdemar cardiac output 4.9.  Cardiac index 3.0.  Thermodilution cardiac output 6.6.  Cardiac index 4.1.  Pulmonary vascular resistance 3 Wood units.       CONCLUSION:  1)  Borderline pulmonary hypertension with mildly increased pulmonary vascular resistance.      2)  Normal left and right ventricular filling pressure.      3)  Normal cardiac output.         cc: Rosy Vu MD

## 2020-04-08 ENCOUNTER — VIRTUAL VISIT (OUTPATIENT)
Dept: TRANSPLANT | Facility: CLINIC | Age: 60
End: 2020-04-08
Attending: INTERNAL MEDICINE
Payer: COMMERCIAL

## 2020-04-08 DIAGNOSIS — Z94.2 LUNG REPLACED BY TRANSPLANT (H): Primary | ICD-10-CM

## 2020-04-08 RX ORDER — ALBUTEROL SULFATE 90 UG/1
2 AEROSOL, METERED RESPIRATORY (INHALATION) 2 TIMES DAILY
Qty: 1 INHALER | Refills: 1 | Status: SHIPPED | OUTPATIENT
Start: 2020-04-08 | End: 2020-09-09

## 2020-04-08 NOTE — NURSING NOTE
Transplant Coordinator Note     Reason for visit: Post lung transplant follow up visit   Coordinator: Present (video)  Caregiver: Ranjan, .      Health concerns addressed today:  1. Feeling good, better than she has in some time.   2. Dr. Schaffer spoke with Dr. Mcelroy yesterday, RHC numbers not significantly elevated.   3. Voriconazole started 10/2019. Wondering about discontinuing.   4. Urinating like normal, but creatinine still high. Thinking about home dialysis vs kidney transplant referral.   5. Hasn't needed a tap in almost 2 months.   6. Cough episodes are less.   7. VSS per patient.     Activity/rehab: pt is able to do ADLs  Oxygen needs: none   Pain management/RX:   High risk donor: Yes  CMV status:  Valcyte stopped:   DVT/PE:  Post op AFIB/follow up with EP:  AC/asa:   PJP prophylactic: Dapsone     Pt Education: medications (use/dose/side effects), how/when to call coordinator, frequency of labs, s/s of infection/rejection, call prior to starting any new medications, lab/vital sign book     Health Maintenance:     Last colonoscopy:     Next colonoscopy due:     Dermatology:    Vaccinations this visit:      Labs, CXR, PFTs reviewed with patient  Medication record reviewed and reconciled  Questions and concerns addressed     Patient Instructions  1. We will follow up with you about the voriconazole.   2. Okay to stop nebs.   3. We will let you know about any imaging that we want done at [facility].   4. Use albuterol inhaler twice daily.     Next transplant clinic appointment: 3 months with CXR, labs and PFTs  Next labs: weekly at dialysis    AVS printed at time of check out

## 2020-04-08 NOTE — PATIENT INSTRUCTIONS
Patient Instructions  1. We will follow up with you about the voriconazole.   2. Okay to stop nebs.   3. We will let you know about any imaging that we want done at [facility].   4. Use albuterol inhaler twice daily.     Next transplant clinic appointment: 3 months with CXR, labs and PFTs  Next labs: weekly at dialysis

## 2020-04-08 NOTE — LETTER
"4/8/2020      RE: Kecia Blue  [address] [place]  [facility] [address]       Kecia Blue is a 59 year old female who is being evaluated via a billable video visit.      The patient has been notified of following:     \"This video visit will be conducted via a call between you and your physician/provider. We have found that certain health care needs can be provided without the need for an in-person physical exam.  This service lets us provide the care you need with a video conversation.  If a prescription is necessary we can send it directly to your pharmacy.  If lab work is needed we can place an order for that and you can then stop by our lab to have the test done at a later time.    Video visits are billed at different rates depending on your insurance coverage.  Please reach out to your insurance provider with any questions.    If during the course of the call the physician/provider feels a video visit is not appropriate, you will not be charged for this service.\"    Patient has given verbal consent for Video visit? Yes    Patient would like the video invitation sent by: Send to e-mail at: [email or web address]    Video Start Time: 8:45am    Kecia Bule complains of    Chief Complaint   Patient presents with     Video Visit     follow-up S/P Lung Transplant        I have reviewed and updated the patient's Past Medical History, Social History, Family History and Medication List.    ALLERGIES  Patient has no known allergies.    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 This has been complicated by riht main bronchial stenosis that was treated with dilation, most recently in 3/2019 She is currently on 2 drug immune " "suppression (tacrolimus and prednisone) because of hx of leukopenia, liver dysfunction, elevated alk phos and recurrent infections (CMV, aspergillus).    #  ID prophylaxis includes dapsone for PJP which was started during her 10/2019 hospitalization (G6PD level is normal). Will decrease dose to 50 mg po every day if okayed by ID>    #  Productive cough with accompanying upper respiratory symptoms, evaluated at 3/9/20 as detailed in Dr. Christensen's note from that date. Symptoms resolved after treatment with azithro and augmentin. Has also been taking albuterol nebs bid-->recommended that she transition to albuterol MDI to see if this is as effective as neb.    #  Pulmonary hypertension.  Pre-[facility] transplant PA presssure was 98/45, mean 61 mm hg.  However, right heart cath on 3/10/20 showed only mild PAH, with PAP 33/18, mean 24.    #  Ascites and portal hypertension, initially thought to be related to chronic right-sided heart pressures on basis of liver biopsy. Perhaps this is \"residual\" disease\" related to hx prior congestion with decompensation when she develped ascites.    #  History of left-sided Aspergillus empyema, which was first noted on 10/08/2019.  Pleural effusion has nearly resolved (not enough left to safely tap), based on Based on CT chest on 3/9/20--although cannot excluded residual IFI based on imaging. Per Transplant ID note of 12/18/19 and subsequent communication on EMR, plan to increase vori dose to 250 mg bid (currently at 200 bid with subtherapeutic level recently), check vori level in 2 weeks, and plan on another 3 months of treatment.    #  History of CMV viremia.  Her most recent CMV PCR was on 02/11/2020 and was below the detectable level in their lab of 35 copies per mL.  Most recent blood CMV PCR was <137 cpml (3/9/20)    #  History of EBV viremia, with most recent result 8,918 cpml (3/9/20).    #  Dialysis-dependent renal failure.  She reports good urine output but her dialysis team is " dubious that her kidneys will improved. Pt understands that she may need continued hemodialysis or change to PD and/or be evaluated for possible kidney transplant.    #  History of hypertension, being treated with amlodipine, carvedilol and Imdur.      Please also refer to RN Transplant Coordinator note for additional information related to this visit.    PATIENT PROFILE AND TRANSPLANT HISTORY:  Current age:                  59 year old  Underlying lung disease: IIP: Nonspecific Interstitial Pneumonia    Transplant date(s):        3/1/2018 (Lung)  Transplant POD(s):        769  Lung transplant type(s): Bilateral sequential      Transplant coordinator:   Mikayla Latham  Transplant provider(s):   Ame Servin    ALLERGIES/INTOLERANCES/SENSITIVITIES  Patient has no known allergies.    Active Patient Thresholds     Lab Low High Effective Since Comment    Tacrolimus Level 8 10 06/06/2019 6/6/19 CP          INTERVAL HISTORY:  --Most recent resulted PRA: 2/19/20, ne for DSA    --Most recent lung transplant clinic visit: 3/9/2020 (Christensen)    --Interval clinic visits, test results, signif medical events (obtained by chart review and patient hx):    3/10/20:  --Right heart catheterization  --Right sided filling pressures are mildly elevated  --Mild elevated Pulmonary Hypertension.  --Left sided filling pressures are mildly elevated.  --Normal cardiac output level.    --Today:    Ms. Blue reports that, overall, she has been doing quite well recently.  She is not having shortness of breath at rest or with her daily activities.  She is not having productive cough, hemoptysis, chest pain.  She continues to use albuterol nebs twice a day and thinks they have helped her chest congestion.    REVIEW OF SYSTEMS:    #She has not required paracentesis for the past 2 months.  She is also not having peripheral edema    #She continues 3 times a week hemodialysis and is tolerating her runs well    #Her appetite  "is \"reasonable\"    #She checks her vitals twice a day and has not had a fever.  She also has not had signs of systemic illness.    #Complete review of systems was asked and was otherwise negative.      I have reviewed and updated the patient's Past Medical History, Social History, Family History and Medication List.      ATTESTATION    Kecia Blue is a 59 year old female who is having lung transplant follow-up via a billable video visit.  Also on the \"call\" were the patient's , Ranjan; and transplant coordinator Mikayla Latham RN.    I have reviewed the note as documented above.  This accurately captures the substance of my conversation with the patient.    Video-Visit Details    Type of service:  Video Visit    Video Start Time (time video started): 8:50am    Video End Time (time video stopped): 9:25am    Originating Location (pt. Location): Home    Distant Location (provider location):  [facility] [place] [facility] TRANSPLANT     Mode of Communication:  Video Conference via [facility]        Srinivas Mcelroy MD    "

## 2020-04-09 ENCOUNTER — TELEPHONE (OUTPATIENT)
Dept: TRANSPLANT | Facility: CLINIC | Age: 60
End: 2020-04-09

## 2020-04-09 LAB
TACROLIMUS BLD-MCNC: 8.2 UG/L (ref 5–15)
TME LAST DOSE: NORMAL H

## 2020-04-09 NOTE — RESULT ENCOUNTER NOTE
Armand Mcdonnell, your tacrolimus level was 8.2 at 12 hours on 4/7/2020 which is within your goal range of 8-10. No dose change at this time. Please call the transplant office ([phone]) with any questions. Thanks, Mikayla

## 2020-04-09 NOTE — TELEPHONE ENCOUNTER
LM with patient to discuss the following plan for her voriconazole per Dr. Mcelroy and ID:    Increase voriconazole to 300 mg BID and continue for 3 months from now as long as things don't change with her status.     Check vori level in 7-14 days.     Requested call back to discuss.

## 2020-04-10 NOTE — TELEPHONE ENCOUNTER
Patient Call: Voicemail  Date/Time: 4/9/20 at 4:29 pm  Reason for call: patient returning call to coordinator.

## 2020-04-13 ENCOUNTER — TELEPHONE (OUTPATIENT)
Dept: TRANSPLANT | Facility: CLINIC | Age: 60
End: 2020-04-13

## 2020-04-13 DIAGNOSIS — Z94.2 S/P LUNG TRANSPLANT (H): ICD-10-CM

## 2020-04-13 DIAGNOSIS — Z79.2 ADMINISTRATION OF LONG-TERM PROPHYLACTIC ANTIBIOTICS: ICD-10-CM

## 2020-04-13 RX ORDER — DAPSONE 100 MG/1
50 TABLET ORAL DAILY
Qty: 15 TABLET | Refills: 11 | Status: SHIPPED | OUTPATIENT
Start: 2020-04-13 | End: 2020-07-31

## 2020-04-13 NOTE — TELEPHONE ENCOUNTER
Per Dr. Mcelroy and ID team:  Keep vori dose at 250 mg BID for now and f/u next level.   Decrease dapsone 50 mg daily.     Discussed with patient who agrees with plan.

## 2020-04-14 DIAGNOSIS — Z94.2 LUNG REPLACED BY TRANSPLANT (H): ICD-10-CM

## 2020-04-14 PROCEDURE — 80197 ASSAY OF TACROLIMUS: CPT | Performed by: INTERNAL MEDICINE

## 2020-04-16 LAB
TACROLIMUS BLD-MCNC: 7.3 UG/L (ref 5–15)
TME LAST DOSE: NORMAL H

## 2020-04-17 ENCOUNTER — TELEPHONE (OUTPATIENT)
Dept: TRANSPLANT | Facility: CLINIC | Age: 60
End: 2020-04-17

## 2020-04-17 DIAGNOSIS — Z94.2 S/P LUNG TRANSPLANT (H): ICD-10-CM

## 2020-04-17 RX ORDER — TACROLIMUS 1 MG/1
1 CAPSULE ORAL 2 TIMES DAILY
Qty: 60 CAPSULE | Refills: 11 | Status: CANCELLED | OUTPATIENT
Start: 2020-04-17

## 2020-04-17 RX ORDER — TACROLIMUS 1 MG/1
1 CAPSULE ORAL 2 TIMES DAILY
Qty: 60 CAPSULE | Refills: 11 | Status: SHIPPED | OUTPATIENT
Start: 2020-04-17 | End: 2020-05-15

## 2020-04-17 NOTE — RESULT ENCOUNTER NOTE
Tacrolimus level 7.3 at 12 hours, on 4/14/20  Goal 8-10.   Current dose 1 mg in AM, 0.5 mg in PM    Dose changed to  1 mg in AM, 1 mg in PM   Recheck level in 7 days    Discussed with pT    MyChart message sent

## 2020-04-21 DIAGNOSIS — Z94.2 LUNG REPLACED BY TRANSPLANT (H): ICD-10-CM

## 2020-04-21 PROCEDURE — 80197 ASSAY OF TACROLIMUS: CPT | Performed by: INTERNAL MEDICINE

## 2020-04-22 ENCOUNTER — TELEPHONE (OUTPATIENT)
Dept: TRANSPLANT | Facility: CLINIC | Age: 60
End: 2020-04-22

## 2020-04-22 NOTE — TELEPHONE ENCOUNTER
April 22, 2020 9:10 AM -  AIVERSE1: called pt this am to [facility] follow up 3 Months w james burden and [facility] 7/15

## 2020-04-26 LAB
TACROLIMUS BLD-MCNC: 8 UG/L (ref 5–15)
TME LAST DOSE: NORMAL H

## 2020-04-27 NOTE — RESULT ENCOUNTER NOTE
Armand Mcdonnell, your tacrolimus level was 8 at 12 hours on 4/21/2020 which is within your goal range of 8-10. No dose change at this time. Please call the transplant office ([phone]) with any questions. Thanks, Mikayla

## 2020-04-28 DIAGNOSIS — Z94.2 LUNG REPLACED BY TRANSPLANT (H): ICD-10-CM

## 2020-04-28 PROCEDURE — 80197 ASSAY OF TACROLIMUS: CPT | Performed by: INTERNAL MEDICINE

## 2020-04-30 ENCOUNTER — TELEPHONE (OUTPATIENT)
Dept: NEPHROLOGY | Facility: CLINIC | Age: 60
End: 2020-04-30

## 2020-04-30 LAB
TACROLIMUS BLD-MCNC: 7.9 UG/L (ref 5–15)
TME LAST DOSE: NORMAL H

## 2020-04-30 NOTE — TELEPHONE ENCOUNTER
Pt inquires about continuing Vori. Pt to continue Vori for now. Awaiting level. May need dose adjustment. Will contact pt if dose increase is needed. Pt agreeable to the plan.

## 2020-04-30 NOTE — TELEPHONE ENCOUNTER
Patient Call: General  Route to LPN    Reason for call: pt needs a call to discuss a medication questions with a coordinator    Call back needed? Yes    Return Call Needed  Same as documented in contacts section  When to return call?: Greater than one day: Route standard priority

## 2020-04-30 NOTE — RESULT ENCOUNTER NOTE
Armand Mcdonnell, your tacrolimus level was 7.9 at 12 hours on 4/28/2020 which is within your goal range of 8-10. No dose change at this time. Please call the transplant office ([phone]) with any questions. Thanks, Mikayla

## 2020-05-05 PROCEDURE — 80197 ASSAY OF TACROLIMUS: CPT | Performed by: INTERNAL MEDICINE

## 2020-05-07 ENCOUNTER — TELEPHONE (OUTPATIENT)
Dept: TRANSPLANT | Facility: CLINIC | Age: 60
End: 2020-05-07

## 2020-05-07 DIAGNOSIS — Z94.2 LUNG REPLACED BY TRANSPLANT (H): ICD-10-CM

## 2020-05-07 RX ORDER — VORICONAZOLE 50 MG/1
100 TABLET, FILM COATED ORAL 2 TIMES DAILY
Qty: 120 TABLET | Refills: 2 | Status: SHIPPED | OUTPATIENT
Start: 2020-05-07 | End: 2020-08-31

## 2020-05-07 RX ORDER — VORICONAZOLE 200 MG/1
200 TABLET, FILM COATED ORAL 2 TIMES DAILY
Qty: 60 TABLET | Refills: 2 | Status: SHIPPED | OUTPATIENT
Start: 2020-05-07 | End: 2020-08-31

## 2020-05-07 NOTE — TELEPHONE ENCOUNTER
Per ID and Dr. Mcelroy, increase voriconazole to 300 mg BID and continue x3 months from now. Patient notified.

## 2020-05-08 LAB
TACROLIMUS BLD-MCNC: 8.5 UG/L (ref 5–15)
TME LAST DOSE: NORMAL H

## 2020-05-11 NOTE — RESULT ENCOUNTER NOTE
Armand Mcdonnell, your tacrolimus level was 8.5 at 12 hours on 5/5/2020 which is within your goal range of 8-10. No dose change at this time. Please call the transplant office ([phone]) with any questions. Thanks, Mikayla

## 2020-05-12 DIAGNOSIS — Z94.2 LUNG REPLACED BY TRANSPLANT (H): ICD-10-CM

## 2020-05-12 PROCEDURE — 80197 ASSAY OF TACROLIMUS: CPT | Performed by: INTERNAL MEDICINE

## 2020-05-15 ENCOUNTER — TELEPHONE (OUTPATIENT)
Dept: TRANSPLANT | Facility: CLINIC | Age: 60
End: 2020-05-15

## 2020-05-15 DIAGNOSIS — E83.42 HYPOMAGNESEMIA: ICD-10-CM

## 2020-05-15 DIAGNOSIS — Z94.2 LUNG REPLACED BY TRANSPLANT (H): Primary | ICD-10-CM

## 2020-05-15 DIAGNOSIS — Z94.2 S/P LUNG TRANSPLANT (H): ICD-10-CM

## 2020-05-15 LAB
TACROLIMUS BLD-MCNC: 13.1 UG/L (ref 5–15)
TME LAST DOSE: NORMAL H

## 2020-05-15 RX ORDER — TACROLIMUS 0.5 MG/1
0.5 CAPSULE ORAL EVERY EVENING
Qty: 30 CAPSULE | Refills: 11 | Status: SHIPPED | OUTPATIENT
Start: 2020-05-15 | End: 2020-06-15

## 2020-05-15 RX ORDER — TACROLIMUS 1 MG/1
1 CAPSULE ORAL EVERY MORNING
Qty: 30 CAPSULE | Refills: 11 | Status: SHIPPED | OUTPATIENT
Start: 2020-05-15 | End: 2020-06-15

## 2020-05-15 NOTE — TELEPHONE ENCOUNTER
Tacrolimus level 13.1 at 12 hours, on 5/12/20  Goal 8-10.   Current dose 1 mg in AM, 1 mg in PM    Dose changed to  1 mg in AM, 0.5 mg in PM   Recheck level on Tuesday next week.    Discussed with Kecia Lin message sent

## 2020-05-19 DIAGNOSIS — Z94.2 LUNG REPLACED BY TRANSPLANT (H): ICD-10-CM

## 2020-05-19 PROCEDURE — 80197 ASSAY OF TACROLIMUS: CPT | Performed by: INTERNAL MEDICINE

## 2020-05-22 LAB
TACROLIMUS BLD-MCNC: 8 UG/L (ref 5–15)
TME LAST DOSE: NORMAL H

## 2020-05-26 ENCOUNTER — TRANSFERRED RECORDS (OUTPATIENT)
Dept: HEALTH INFORMATION MANAGEMENT | Facility: CLINIC | Age: 60
End: 2020-05-26

## 2020-05-26 DIAGNOSIS — Z94.2 LUNG REPLACED BY TRANSPLANT (H): ICD-10-CM

## 2020-05-26 PROCEDURE — 80197 ASSAY OF TACROLIMUS: CPT | Performed by: PHYSICIAN ASSISTANT

## 2020-05-26 NOTE — RESULT ENCOUNTER NOTE
Armand Mcdonnell, your tacrolimus level was 8 at 12 hours on 5/19/2020 which is within your goal range of 8-10. No dose change at this time. Please call the transplant office ([phone]) with any questions. Thanks, Mikayla

## 2020-05-28 LAB
TACROLIMUS BLD-MCNC: 10.6 UG/L (ref 5–15)
TME LAST DOSE: NORMAL H

## 2020-05-28 NOTE — RESULT ENCOUNTER NOTE
Kecia, your tacrolimus level is 10.6 and at goal of 8-10.  No dose change needed.  Call with questions.  Magaly

## 2020-06-02 ENCOUNTER — TRANSFERRED RECORDS (OUTPATIENT)
Dept: HEALTH INFORMATION MANAGEMENT | Facility: CLINIC | Age: 60
End: 2020-06-02

## 2020-06-02 DIAGNOSIS — Z94.2 LUNG REPLACED BY TRANSPLANT (H): ICD-10-CM

## 2020-06-02 PROCEDURE — 80197 ASSAY OF TACROLIMUS: CPT | Performed by: INTERNAL MEDICINE

## 2020-06-05 LAB
TACROLIMUS BLD-MCNC: 8.8 UG/L (ref 5–15)
TME LAST DOSE: NORMAL H

## 2020-06-05 NOTE — RESULT ENCOUNTER NOTE
Tacrolimus level 8.8 at 12 hours, on 6/2/20  Goal 8-10.   Current dose 1 mg in AM, 0.5 mg in PM    No dose change. At goal.     [employer] message sent

## 2020-06-09 ENCOUNTER — TRANSFERRED RECORDS (OUTPATIENT)
Dept: HEALTH INFORMATION MANAGEMENT | Facility: CLINIC | Age: 60
End: 2020-06-09

## 2020-06-09 DIAGNOSIS — Z94.2 LUNG REPLACED BY TRANSPLANT (H): ICD-10-CM

## 2020-06-09 PROCEDURE — 80197 ASSAY OF TACROLIMUS: CPT | Performed by: INTERNAL MEDICINE

## 2020-06-11 LAB
TACROLIMUS BLD-MCNC: 10.9 UG/L (ref 5–15)
TME LAST DOSE: NORMAL H

## 2020-06-15 ENCOUNTER — TELEPHONE (OUTPATIENT)
Dept: TRANSPLANT | Facility: CLINIC | Age: 60
End: 2020-06-15

## 2020-06-15 DIAGNOSIS — Z94.2 S/P LUNG TRANSPLANT (H): ICD-10-CM

## 2020-06-15 RX ORDER — TACROLIMUS 0.5 MG/1
0.5 CAPSULE ORAL 2 TIMES DAILY
Qty: 60 CAPSULE | Refills: 11 | Status: SHIPPED | OUTPATIENT
Start: 2020-06-15 | End: 2020-07-15

## 2020-06-15 NOTE — TELEPHONE ENCOUNTER
Tacrolimus level 10.9 at 12 hours, on 6/9/2020  Goal 8-10.   Current dose 1 mg in AM, 0.5 mg in PM    Dose changed to 0.5 mg in AM, 0.5 mg in PM   Recheck level in 7-14 days    Nurse to contact patient with dose change and plan to recheck.

## 2020-06-15 NOTE — TELEPHONE ENCOUNTER
Contacted Kecia with Tacro level, dose change and next lab.Per Mikayla    Tacrolimus level 10.9 at 12 hours, on 6/9/2020  Goal 8-10.   Current dose 1 mg in AM, 0.5 mg in PM     Dose changed to 0.5 mg in AM, 0.5 mg in PM   Recheck level in 7-14 days     Nurse to contact patient with dose change and plan to recheck.

## 2020-06-16 ENCOUNTER — TRANSFERRED RECORDS (OUTPATIENT)
Dept: HEALTH INFORMATION MANAGEMENT | Facility: CLINIC | Age: 60
End: 2020-06-16

## 2020-06-16 DIAGNOSIS — Z94.2 LUNG REPLACED BY TRANSPLANT (H): ICD-10-CM

## 2020-06-16 PROCEDURE — 80197 ASSAY OF TACROLIMUS: CPT | Performed by: PHYSICIAN ASSISTANT

## 2020-06-17 ENCOUNTER — VIRTUAL VISIT (OUTPATIENT)
Dept: GASTROENTEROLOGY | Facility: CLINIC | Age: 60
End: 2020-06-17
Attending: FAMILY MEDICINE
Payer: COMMERCIAL

## 2020-06-17 VITALS — DIASTOLIC BLOOD PRESSURE: 77 MMHG | SYSTOLIC BLOOD PRESSURE: 124 MMHG | BODY MASS INDEX: 21.4 KG/M2 | WEIGHT: 124.7 LBS

## 2020-06-17 DIAGNOSIS — R79.89 ELEVATED LIVER FUNCTION TESTS: ICD-10-CM

## 2020-06-17 DIAGNOSIS — K76.6 PORTAL HYPERTENSION (H): Primary | ICD-10-CM

## 2020-06-17 NOTE — LETTER
"    6/17/2020         RE: Kecia Blue  [address] [place] [address]        Dear Colleague,    Thank you for referring your patient, Kecia Blue, to the [facility] HEPATOLOGY. Please see a copy of my visit note below.    Kecia Blue is a 59 year old female who is being evaluated via a billable video visit.      The patient has been notified of following:     \"This video visit will be conducted via a call between you and your physician/provider. We have found that certain health care needs can be provided without the need for an in-person physical exam.  This service lets us provide the care you need with a video conversation.  If a prescription is necessary we can send it directly to your pharmacy.  If lab work is needed we can place an order for that and you can then stop by our lab to have the test done at a later time.    Video visits are billed at different rates depending on your insurance coverage.  Please reach out to your insurance provider with any questions.    If during the course of the call the physician/provider feels a video visit is not appropriate, you will not be charged for this service.\"    Patient has given verbal consent for Video visit? Yes    Will anyone else be joining your video visit? 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  As previously documented, ascitic fluid analysis was suggestive of portal hypertension, and we sent her for liver biopsy that showed Congestive hepatopathy with moderate zone 3 pericellular fibrosis without " any bridging fibrosis. Portal pressures were also elevated.  Since she was last seen, she has continued to do well, is making urine, and has no ascites.  She is continue to be compliant with her dialysis sessions, and states that they are having to remove less and less fluid.  Her creatinine is also been stable, she has no abdominal pain, no nausea or vomiting, dysphagia, chest pain, shortness of breath, melena, hematochezia, confusion, or new leg swelling.  She has an appointment with pulmonology very soon, and they will be discussing possibly stopping her voriconazole.    ROS: 10pt ROS performed and otherwise negative.    PERTINENT PAST MEDICAL/SURGICAL HISTORY:  Past Medical History:   Diagnosis Date     Antisynthetase syndrome (H) 2014     Chronic cough      Chronic infection     recent C diff  8/18     Dehydration 8/1/2018     Dermatomyositis (H)      Dysplasia of cervix, low grade (ESTRADA 1)      ILD (interstitial lung disease) (H)      Osteopenia      PONV (postoperative nausea and vomiting)      Pulmonary hypertension (H)      Raynaud's disease      Seronegative rheumatoid arthritis (H)       PERTINENT MEDICATIONS:    Current Outpatient Medications:      albuterol (PROAIR HFA/PROVENTIL HFA/VENTOLIN HFA) 108 (90 Base) MCG/ACT inhaler, Inhale 2 puffs into the lungs 2 times daily, Disp: 1 Inhaler, Rfl: 1     amLODIPine (NORVASC) 5 MG tablet, Take 2 tablets (10 mg) by mouth daily, Disp: 60 tablet, Rfl: 11     calcium-vitamin D (CALTRATE) 600-400 MG-UNIT per tablet, Take 1 tablet by mouth daily, Disp: , Rfl:      carvedilol (COREG) 25 MG tablet, Take 1 tablet (25 mg) by mouth 2 times daily (with meals), Disp: 60 tablet, Rfl: 11     dapsone (ACZONE) 100 MG tablet, Take 0.5 tablets (50 mg) by mouth daily, Disp: 15 tablet, Rfl: 11     ferrous sulfate (FEROSUL) 325 (65 Fe) MG tablet, Take 1 tablet (325 mg) by mouth daily (with breakfast), Disp: 60 tablet, Rfl: 2     magnesium oxide (MAG-OX) 400 MG tablet, Take 400 mg by  mouth daily , Disp: , Rfl:      multivitamin, therapeutic with minerals (THERA-VIT-M) TABS tablet, Take 1 tablet by mouth daily, Disp: 30 each, Rfl: 11     ondansetron (ZOFRAN) 4 MG tablet, Take 1 tablet (4 mg) by mouth every 12 hours as needed for nausea, Disp: 60 tablet, Rfl: 0     pantoprazole (PROTONIX) 40 MG EC tablet, Take 1 tablet (40 mg) by mouth 2 times daily, Disp: 60 tablet, Rfl: 11     predniSONE 2.5 MG PO tablet, Take two tablets (5mg) every AM and one tablet (2.5mg) every evening, Disp: 90 tablet, Rfl: 11     tacrolimus (GENERIC EQUIVALENT) 0.5 MG capsule, Take 1 capsule (0.5 mg) by mouth 2 times daily, Disp: 60 capsule, Rfl: 11     voriconazole (VFEND) 200 MG tablet, Take 1 tablet (200 mg) by mouth 2 times daily Total dose: 300 mg twice daily, Disp: 60 tablet, Rfl: 2     voriconazole (VFEND) 50 MG tablet, Take 2 tablets (100 mg) by mouth 2 times daily Total dose: 300 mg twice daily, Disp: 120 tablet, Rfl: 2      PHYSICAL EXAMINATION:  Virtual visit    PERTINENT STUDIES:  Reviewed and noted.   Platelet count 142, INR 1.03    ASSESSMENT/PLAN:  1. 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  As previously documented, ascitic fluid analysis was suggestive of portal hypertension, and we sent her for liver biopsy that showed Congestive hepatopathy with moderate zone 3 pericellular fibrosis without any bridging fibrosis. Portal pressures were also elevated.    She has continued to do well, with resolution of ascites, and improving kidney function.  It is quite possible that she developed congestive hepatopathy and portal hypertension from pulmonary hypertension from her chronic lung disease  and this has improved following her lung transplantation.  Fluid overload is also being controlled given regular dialysis and improvement of kidney function overall.  I think she is going to do well, and at this point I do not have any new recommendations.  We will continue to follow her closely and see again in about 6 months.    RTC 6 months, sooner if symptomatic.      The visit lasted up to 20 minutes, with more than half of the time spent on counseling and education.  All questions were answered to patient's satisfaction    Patient seen and discussed with staff GI physician, Dr. Denise, who also was on the video call, and who agrees with my assessment and plan.      Vale Mas MD  Gastroenterology fellow  PGY 5  [phone]     Type of service:  Video Visit    Video Start Time: 11:06  Video End Time: 11:26    Originating Location (pt. Location): Home    Distant Location (provider location):  [facility] HEPATOLOGY     Platform used for Video Visit: Shaheen Mas MD          Again, thank you for allowing me to participate in the care of your patient.        Sincerely,        Vale Mas MD

## 2020-06-17 NOTE — PROGRESS NOTES
"Kecia Blue is a 59 year old female who is being evaluated via a billable video visit.      The patient has been notified of following:     \"This video visit will be conducted via a call between you and your physician/provider. We have found that certain health care needs can be provided without the need for an in-person physical exam.  This service lets us provide the care you need with a video conversation.  If a prescription is necessary we can send it directly to your pharmacy.  If lab work is needed we can place an order for that and you can then stop by our lab to have the test done at a later time.    Video visits are billed at different rates depending on your insurance coverage.  Please reach out to your insurance provider with any questions.    If during the course of the call the physician/provider feels a video visit is not appropriate, you will not be charged for this service.\"    Patient has given verbal consent for Video visit? Yes    Will anyone else be joining your video visit? 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  As previously documented, ascitic fluid analysis was suggestive of portal hypertension, and we sent her for liver biopsy that showed Congestive hepatopathy with moderate zone 3 pericellular fibrosis without any bridging fibrosis. Portal pressures were also elevated.  Since she was last seen, she has continued to do well, is making urine, and has no ascites.  She is continue to be compliant with her dialysis sessions, and states that they are having to " remove less and less fluid.  Her creatinine is also been stable, she has no abdominal pain, no nausea or vomiting, dysphagia, chest pain, shortness of breath, melena, hematochezia, confusion, or new leg swelling.  She has an appointment with pulmonology very soon, and they will be discussing possibly stopping her voriconazole.    ROS: 10pt ROS performed and otherwise negative.    PERTINENT PAST MEDICAL/SURGICAL HISTORY:  Past Medical History:   Diagnosis Date     Antisynthetase syndrome (H) 2014     Chronic cough      Chronic infection     recent C diff  8/18     Dehydration 8/1/2018     Dermatomyositis (H)      Dysplasia of cervix, low grade (ESTRADA 1)      ILD (interstitial lung disease) (H)      Osteopenia      PONV (postoperative nausea and vomiting)      Pulmonary hypertension (H)      Raynaud's disease      Seronegative rheumatoid arthritis (H)       PERTINENT MEDICATIONS:    Current Outpatient Medications:      albuterol (PROAIR HFA/PROVENTIL HFA/VENTOLIN HFA) 108 (90 Base) MCG/ACT inhaler, Inhale 2 puffs into the lungs 2 times daily, Disp: 1 Inhaler, Rfl: 1     amLODIPine (NORVASC) 5 MG tablet, Take 2 tablets (10 mg) by mouth daily, Disp: 60 tablet, Rfl: 11     calcium-vitamin D (CALTRATE) 600-400 MG-UNIT per tablet, Take 1 tablet by mouth daily, Disp: , Rfl:      carvedilol (COREG) 25 MG tablet, Take 1 tablet (25 mg) by mouth 2 times daily (with meals), Disp: 60 tablet, Rfl: 11     dapsone (ACZONE) 100 MG tablet, Take 0.5 tablets (50 mg) by mouth daily, Disp: 15 tablet, Rfl: 11     ferrous sulfate (FEROSUL) 325 (65 Fe) MG tablet, Take 1 tablet (325 mg) by mouth daily (with breakfast), Disp: 60 tablet, Rfl: 2     magnesium oxide (MAG-OX) 400 MG tablet, Take 400 mg by mouth daily , Disp: , Rfl:      multivitamin, therapeutic with minerals (THERA-VIT-M) TABS tablet, Take 1 tablet by mouth daily, Disp: 30 each, Rfl: 11     ondansetron (ZOFRAN) 4 MG tablet, Take 1 tablet (4 mg) by mouth every 12 hours as needed for  nausea, Disp: 60 tablet, Rfl: 0     pantoprazole (PROTONIX) 40 MG EC tablet, Take 1 tablet (40 mg) by mouth 2 times daily, Disp: 60 tablet, Rfl: 11     predniSONE 2.5 MG PO tablet, Take two tablets (5mg) every AM and one tablet (2.5mg) every evening, Disp: 90 tablet, Rfl: 11     tacrolimus (GENERIC EQUIVALENT) 0.5 MG capsule, Take 1 capsule (0.5 mg) by mouth 2 times daily, Disp: 60 capsule, Rfl: 11     voriconazole (VFEND) 200 MG tablet, Take 1 tablet (200 mg) by mouth 2 times daily Total dose: 300 mg twice daily, Disp: 60 tablet, Rfl: 2     voriconazole (VFEND) 50 MG tablet, Take 2 tablets (100 mg) by mouth 2 times daily Total dose: 300 mg twice daily, Disp: 120 tablet, Rfl: 2      PHYSICAL EXAMINATION:  Virtual visit    PERTINENT STUDIES:  Reviewed and noted.   Platelet count 142, INR 1.03    ASSESSMENT/PLAN:  1. 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  As previously documented, ascitic fluid analysis was suggestive of portal hypertension, and we sent her for liver biopsy that showed Congestive hepatopathy with moderate zone 3 pericellular fibrosis without any bridging fibrosis. Portal pressures were also elevated.    She has continued to do well, with resolution of ascites, and improving kidney function.  It is quite possible that she developed congestive hepatopathy and portal hypertension from pulmonary hypertension from her chronic lung disease and this has improved following her lung transplantation.  Fluid overload is also being controlled given regular dialysis and improvement of kidney function overall.  I think she is going to do well, and at this point I do not have any new  recommendations.  We will continue to follow her closely and see again in about 6 months.    RTC 6 months, sooner if symptomatic.      The visit lasted up to 20 minutes, with more than half of the time spent on counseling and education.  All questions were answered to patient's satisfaction    Patient seen and discussed with staff GI physician, Dr. Denise, who also was on the video call, and who agrees with my assessment and plan.      Vale Mas MD  Gastroenterology fellow  PGY 5  [phone]     Type of service:  Video Visit    Video Start Time: 11:06  Video End Time: 11:26    Originating Location (pt. Location): Home    Distant Location (provider location):  [facility] HEPATOLOGY     Platform used for Video Visit: [facility]    Vale Mas MD    Attestation:  This patient has been seen via  video  and evaluated by me, Feng Denise.  Discussed with the house staff team or resident(s) and agree with the findings and plan in this note.

## 2020-06-18 LAB
TACROLIMUS BLD-MCNC: 6.5 UG/L (ref 5–15)
TME LAST DOSE: NORMAL H

## 2020-06-19 NOTE — RESULT ENCOUNTER NOTE
Armand Mcdonnell, your tacrolimus level was 6.5 at 12 hours on 6/16/2020. This is a little low, but since we just decreased your dose last week for a high level, let's recheck next week and if still outside your range, will adjust accordingly. No dose change at this time. Please call the transplant office ([phone]) with any questions. Thanks, Mikayla

## 2020-06-23 ENCOUNTER — TELEPHONE (OUTPATIENT)
Dept: TRANSPLANT | Facility: CLINIC | Age: 60
End: 2020-06-23

## 2020-06-23 ENCOUNTER — TRANSFERRED RECORDS (OUTPATIENT)
Dept: HEALTH INFORMATION MANAGEMENT | Facility: CLINIC | Age: 60
End: 2020-06-23

## 2020-06-23 DIAGNOSIS — Z94.2 LUNG REPLACED BY TRANSPLANT (H): ICD-10-CM

## 2020-06-23 PROCEDURE — 80197 ASSAY OF TACROLIMUS: CPT | Performed by: PHYSICIAN ASSISTANT

## 2020-06-23 NOTE — TELEPHONE ENCOUNTER
DATE:  6/23/2020   TIME OF RECEIPT FROM LAB:  1:59 PM  LAB TEST:  Alk Phos  LAB VALUE:  535  RESULTS GIVEN WITH READ-BACK TO (PROVIDER):  Mikayla Latham RN  TIME LAB VALUE REPORTED TO PROVIDER:   2:06 PM

## 2020-06-24 LAB
TACROLIMUS BLD-MCNC: 7.3 UG/L (ref 5–15)
TME LAST DOSE: NORMAL H

## 2020-06-26 ENCOUNTER — TELEPHONE (OUTPATIENT)
Dept: TRANSPLANT | Facility: CLINIC | Age: 60
End: 2020-06-26

## 2020-06-26 NOTE — RESULT ENCOUNTER NOTE
Armand Mcdonnell, your tacrolimus level was 7.3 at 12 hours on 6/23/20 which is within your goal range of 8-10. No dose change at this time. Please call the transplant office ([phone]) with any questions. Thanks, Mikayla

## 2020-06-26 NOTE — TELEPHONE ENCOUNTER
Spoke with patient, labs have been stable recently. Will do every 2 weeks now instead of weekly. Vori level monthly. Patient agrees with plan, new standing orders faxed to clinic lab.

## 2020-06-30 ENCOUNTER — TELEPHONE (OUTPATIENT)
Dept: TRANSPLANT | Facility: CLINIC | Age: 60
End: 2020-06-30

## 2020-06-30 ENCOUNTER — TRANSFERRED RECORDS (OUTPATIENT)
Dept: HEALTH INFORMATION MANAGEMENT | Facility: CLINIC | Age: 60
End: 2020-06-30

## 2020-06-30 DIAGNOSIS — Z94.2 LUNG REPLACED BY TRANSPLANT (H): ICD-10-CM

## 2020-06-30 LAB
ALT SERPL-CCNC: 23 U/L (ref 0–55)
AST SERPL-CCNC: 23 U/L (ref 5–34)
CREAT SERPL-MCNC: 2.3 MG/DL (ref 0.55–1.02)
GFR SERPL CREATININE-BSD FRML MDRD: 23 ML/MIN/1.73M2
GLUCOSE SERPL-MCNC: 126 MG/DL (ref 70–105)
POTASSIUM SERPL-SCNC: 4.2 MMOL/L (ref 3.5–5.1)

## 2020-06-30 PROCEDURE — 80197 ASSAY OF TACROLIMUS: CPT | Performed by: PHYSICIAN ASSISTANT

## 2020-06-30 NOTE — TELEPHONE ENCOUNTER
DATE:  6/30/2020   TIME OF RECEIPT FROM LAB:  1100  LAB TEST:  Alk phos  LAB VALUE:  545  RESULTS GIVEN WITH READ-BACK TO (PROVIDER):  CHANTE CHANEL  TIME LAB VALUE REPORTED TO PROVIDER:   1108

## 2020-07-01 LAB
TACROLIMUS BLD-MCNC: 10 UG/L (ref 5–15)
TME LAST DOSE: NORMAL H

## 2020-07-01 NOTE — RESULT ENCOUNTER NOTE
Armand Mcdonnell, your tacrolimus level was 10 at 12 hours on 6/30/20 which is within your goal range of 8-10. No dose change at this time. Please call the transplant office ([phone]) with any questions. Thanks, Mikayla

## 2020-07-09 ENCOUNTER — MYC MEDICAL ADVICE (OUTPATIENT)
Dept: TRANSPLANT | Facility: CLINIC | Age: 60
End: 2020-07-09

## 2020-07-14 ENCOUNTER — TELEPHONE (OUTPATIENT)
Dept: TRANSPLANT | Facility: CLINIC | Age: 60
End: 2020-07-14

## 2020-07-14 DIAGNOSIS — Z94.2 LUNG REPLACED BY TRANSPLANT (H): ICD-10-CM

## 2020-07-14 PROCEDURE — 80197 ASSAY OF TACROLIMUS: CPT | Performed by: PHYSICIAN ASSISTANT

## 2020-07-14 NOTE — PROGRESS NOTES
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 This has been complicated by riht main bronchial stenosis that was treated with dilation, most recently in 3/2019. She is currently on 2 drug immune suppression (tacrolimus and prednisone) because of hx of leukopenia, liver dysfunction, elevated alk phos and recurrent infections (CMV, aspergillus).     #  ID prophylaxis includes dapsone for PJP which was started during her 10/2019 hospitalization (G6PD level is normal). Currently is on dapsone 50 mg/day-->will check CD4+ count to see of PJP prophylaxis is still needed.     #  Pulmonary hypertension.  Pre-[facility] transplant PA presssure was 98/45, mean 61 mm hg.  However, right heart cath on 3/10/20 showed only mild PAH, with PAP 33/18, mean 24.     #  Ascites and portal hypertension, initially thought to be related to chronic right-sided heart pressures on basis of liver biopsy. Perhaps this is \"residual\" disease\" related to hx prior congestion with decompensation, at which time she develped ascites.     #  History of left-sided Aspergillus empyema, which was first noted on 10/08/2019.  Pleural effusion has nearly resolved (not enough left to safely tap), based on Based on CT chest on 3/9/20--although cannot excluded residual IFI based on imaging. After her 4/8/20 visit, I consulted Transplant ID-->recommended increasing vori dose to 300mg bid x 3 months, which has now been completed. Will repeat CT and discuss duration of treatment with Transplant ID>     #  History of CMV viremia.  Her most recent CMV PCR was on 02/11/2020 and was below the detectable level in their lab of 35 copies per mL.  Most recent blood CMV PCR on 6/30/20 was negative.     #  History of EBV viremia, with most " "recent result 8,918 cpml (3/9/20).    #  Reflux prophylaxis--patient currently on omeprazole 40mg bid and is asymptomatic-->decrease to 40mg every day.     #  Dialysis-dependent renal failure.  She reports good urine output but her dialysis team is dubious that her kidneys will improved. She has been told that she will need to have fistula if she is going to be on long-term dialysis. Also interested in learning more about transplantation. Will request appt with Dr. Gamble, who has seen patient in the past.     #  History of hypertension, being treated with amlodipine and  carvedilol. Blood pressure at dialysis center has been \"okay\" according to the patient.    Please also refer to RN Transplant Coordinator note for additional information related to this visit.    PATIENT PROFILE AND TRANSPLANT HISTORY:  Current age:                    59 year old  Underlying lung disease: IIP: Nonspecific Interstitial Pneumonia    Transplant date(s):        3/1/2018 (Lung)  Transplant POD(s):        866  Lung transplant type(s):       Transplant coordinator:   Mikayla Latham  Transplant provider(s):   Elsy Mccann, Ame Chow, Toby Hernandez, Nicho Hood    ALLERGIES/INTOLERANCES/SENSITIVITIES  Patient has no known allergies.    Active Patient Thresholds     Lab Low High Effective Since Comment    Tacrolimus Level 8 10 06/06/2019 6/6/19 CP          INTERVAL HISTORY:    --Transplant-related lab/procedure results  # Most recent resulted PRA: 2/19/20, neg for DSA  # Most recent bronchoscopy: >1 year  # Most recent transbronchial biopsy: >1 year  # Most recent blood CMV PCR: 6/30/20, neg  # Most recent blood EBV PCR: 3/9/20, +8,918 cpm    --Most recent lung transplant clinic visit: 4/8/20 ([facility])    --Interval clinic visits, significant medical events (obtained by chart review and patient hx):    6/17/20: Hepatology ([facility]): no new recs    --Today:    Ms. Blue was evaluated today via video visit " ([employer]). Also present on the call were the patient's , Ranjan; and lung transplant coordinator, Mikayla Latham RN.    The patient reports that her breathing has been at baseline recently; specifically she is only short of breath with exertion.  She is not having hemoptysis, purulent sputum production, chest pain, wheezing.  She has a nonproductive cough when she wakes up in the morning but that clears up after a short time.    REVIEW OF SYSTEMS:  #Appetite is good and she is actually gaining a little bit of weight    #She is not having nausea, heartburn, vomitin,g or diarrhea    She is continuing 3 times per week hemodialysis which she is tolerating well    #She is not having abdominal swelling or significant leg edema.    #Complete review of systems was asked and was otherwise negative.      PHYSICAL EXAM: Patient appeared comfortable, coherent, and not short of breath.    I have reviewed and updated the patient's Past Medical History, Social History, Family History and Medication List.    STUDIES STILL PENDING AT THE TIME OF THIS NOTE:  Unresulted Labs Ordered in the Past 30 Days of this Admission     No orders found from 6/15/2020 to 7/16/2020.                   Medications:     Current Outpatient Medications   Medication     albuterol (PROAIR HFA/PROVENTIL HFA/VENTOLIN HFA) 108 (90 Base) MCG/ACT inhaler     amLODIPine (NORVASC) 5 MG tablet     calcium-vitamin D (CALTRATE) 600-400 MG-UNIT per tablet     carvedilol (COREG) 25 MG tablet     dapsone (ACZONE) 100 MG tablet     ferrous sulfate (FEROSUL) 325 (65 Fe) MG tablet     magnesium oxide (MAG-OX) 400 MG tablet     multivitamin, therapeutic with minerals (THERA-VIT-M) TABS tablet     ondansetron (ZOFRAN) 4 MG tablet     pantoprazole (PROTONIX) 40 MG EC tablet     predniSONE 2.5 MG PO tablet     tacrolimus (GENERIC EQUIVALENT) 0.5 MG capsule     voriconazole (VFEND) 200 MG tablet     voriconazole (VFEND) 50 MG tablet     No current  "facility-administered medications for this visit.          ATTESTATION    Kecia Blue is a 59 year old female who is having lung transplant follow-up via a billable telephone/video visit.     I have reviewed the note as documented above.  This accurately captures the substance of my conversation with the patient.    Telephone/video contact time: 31 minutes      Complexity indicators:    --immune compromised, on high-risk medications x   --organ transplant recipient x   --multiple organ transplant recipient    --active respiratory infection    --within one year of transplant; and/or within one month of hospitalization    --chronic lung allograft dysfunction syndrome (CLAD, chronic rejection, or bronchiolitis obliterans syndrome)    --new medical problem addressed during this visit    --multiple active medical problems x   --admitted directly to hospital from this clinic visit    -->50% of this visit was spent in counseling and care coordination. If yes, total visit time was         Srinivas Mcelroy MD  PAC    The patient has been notified of following: \"This telephone/video visit will be conducted via a call between you and your physician/provider. We have found that certain health care needs can be provided without the need for a physical exam.  This service lets us provide the care you need with a telephone/video conversation.  If a prescription is necessary we can send it directly to your pharmacy.  If lab work is needed we can place an order for that and you can then stop by our lab to have the test done at a later time. If during the course of the visit the physician/provider feels a telephone/video visit is not appropriate, you will not be charged for this service.\"     "

## 2020-07-14 NOTE — TELEPHONE ENCOUNTER
DATE:  7/14/2020   TIME OF RECEIPT FROM LAB:  11:08 AM  LAB TEST:  ALT  LAB VALUE:  531  RESULTS GIVEN WITH READ-BACK TO (PROVIDER):  Routed to LOUISE Huntley  TIME LAB VALUE REPORTED TO PROVIDER:   11:14 AM

## 2020-07-15 ENCOUNTER — TELEPHONE (OUTPATIENT)
Dept: TRANSPLANT | Facility: CLINIC | Age: 60
End: 2020-07-15

## 2020-07-15 ENCOUNTER — VIRTUAL VISIT (OUTPATIENT)
Dept: TRANSPLANT | Facility: CLINIC | Age: 60
End: 2020-07-15
Attending: INTERNAL MEDICINE
Payer: COMMERCIAL

## 2020-07-15 DIAGNOSIS — Z94.2 S/P LUNG TRANSPLANT (H): ICD-10-CM

## 2020-07-15 DIAGNOSIS — Z94.2 LUNG TRANSPLANT RECIPIENT (H): ICD-10-CM

## 2020-07-15 DIAGNOSIS — J84.9 ILD (INTERSTITIAL LUNG DISEASE) (H): ICD-10-CM

## 2020-07-15 DIAGNOSIS — K21.9 GASTROESOPHAGEAL REFLUX DISEASE WITHOUT ESOPHAGITIS: ICD-10-CM

## 2020-07-15 LAB
TACROLIMUS BLD-MCNC: 5.5 UG/L (ref 5–15)
TME LAST DOSE: NORMAL H

## 2020-07-15 RX ORDER — TACROLIMUS 1 MG/1
1 CAPSULE ORAL EVERY EVENING
Qty: 30 CAPSULE | Refills: 11 | Status: SHIPPED | OUTPATIENT
Start: 2020-07-15 | End: 2020-09-02

## 2020-07-15 RX ORDER — TACROLIMUS 0.5 MG/1
0.5 CAPSULE ORAL 2 TIMES DAILY
Qty: 60 CAPSULE | Refills: 11 | Status: SHIPPED | OUTPATIENT
Start: 2020-07-15 | End: 2020-07-15

## 2020-07-15 RX ORDER — PANTOPRAZOLE SODIUM 40 MG/1
40 TABLET, DELAYED RELEASE ORAL DAILY
Qty: 30 TABLET | Refills: 11 | Status: SHIPPED | OUTPATIENT
Start: 2020-07-15 | End: 2020-10-09

## 2020-07-15 RX ORDER — TACROLIMUS 0.5 MG/1
0.5 CAPSULE ORAL EVERY MORNING
Qty: 30 CAPSULE | Refills: 11 | Status: SHIPPED | OUTPATIENT
Start: 2020-07-15 | End: 2020-09-02

## 2020-07-15 ASSESSMENT — PAIN SCALES - GENERAL: PAINLEVEL: NO PAIN (0)

## 2020-07-15 NOTE — LETTER
PHYSICIAN ORDERS      DATE & TIME ISSUED: July 15, 2020 9:29 AM  PATIENT NAME: Kecia Blue   : 1960     [facility] MR# [if applicable]: [id number]     DIAGNOSIS:  Lung Transplant  Z94.2  Please check t-cell subset profile (specifically need absolute CD4 count) with next labs      Any questions please call: Mikayla     Please fax these results to [phone].      .

## 2020-07-15 NOTE — PATIENT INSTRUCTIONS
Patient Instructions  1. We will check if infectious disease is okay with stopping voriconazole once we review the CT scan.  2. We will also talk to Dr. Gamble about how you are doing/arrange an appointment.   3. We will check a CD4 count with your next labs to see if you can stop Dapsone.   4. Mikayla will send orders for a CT scan to [facility].   5. We will ask [facility] to call you re insurance.      Next transplant clinic appointment: 3 months with CXR, labs and PFTs (in person)  Next labs: every 2 weeks

## 2020-07-15 NOTE — PROGRESS NOTES
"Pre-visit documentation completed by Erich Chand, EMT:    Kecia Blue is a 59 year old female who is being evaluated via a billable video visit.      The patient has been notified of following:     \"This video visit will be conducted via a call between you and your physician/provider. We have found that certain health care needs can be provided without the need for an in-person physical exam.  This service lets us provide the care you need with a video conversation.  If a prescription is necessary we can send it directly to your pharmacy.  If lab work is needed we can place an order for that and you can then stop by our lab to have the test done at a later time.    Video visits are billed at different rates depending on your insurance coverage.  Please reach out to your insurance provider with any questions.    If during the course of the call the physician/provider feels a video visit is not appropriate, you will not be charged for this service.\"    Patient has given verbal consent for Video visit? Yes  How would you like to obtain your AVS? MyChart    Will anyone else be joining your video visit? No        "

## 2020-07-15 NOTE — LETTER
PHYSICIAN ORDERS      DATE & TIME ISSUED: July 15, 2020 9:22 AM  PATIENT NAME: Kecia Blue   : 1960     [facility] MR# [if applicable]: [id number]     DIAGNOSIS:  Lung Transplant  Z94.2    Non-contrast chest CT    Please push images in PACS to [place] once complete      Any questions please call: Mikayla     Please fax these results to [phone].      .

## 2020-07-15 NOTE — LETTER
PHYSICIAN ORDERS      DATE & TIME ISSUED: July 15, 2020 9:22 AM  PATIENT NAME: Kecia Blue   : 1960     [facility] MR# [if applicable]: [id number]     DIAGNOSIS:  Lung Transplant  Z94.2    Non-contrast chest CT    Please push images in PACS to [place] once complete      Any questions please call: Mikayla     Please fax these results to [phone].      .

## 2020-07-15 NOTE — LETTER
PHYSICIAN ORDERS      DATE & TIME ISSUED: July 15, 2020 9:29 AM  PATIENT NAME: Kecia Blue   : 1960     [facility] MR# [if applicable]: [id number]     DIAGNOSIS:  Lung Transplant  Z94.2  Please check t-cell subset profile (specifically need absolute CD4 count) with next labs      Any questions please call: Mikayla     Please fax these results to [phone].      .

## 2020-07-15 NOTE — LETTER
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 This has been complicated by riht main bronchial stenosis that was treated with dilation, most recently in 3/2019. She is currently on 2 drug immune suppression (tacrolimus and prednisone) because of hx of leukopenia, liver dysfunction, elevated alk phos and recurrent infections (CMV, aspergillus).     #  ID prophylaxis includes dapsone for PJP which was started during her 10/2019 hospitalization (G6PD level is normal). Currently is on dapsone 50 mg/day-->will check CD4+ count to see of PJP prophylaxis is still needed.     #  Pulmonary hypertension.  Pre-[facility] transplant PA presssure was 98/45, mean 61 mm hg.  However, right heart cath on 3/10/20 showed only mild PAH, with PAP 33/18, mean 24.     #  Ascites and portal hypertension, initially thought to be related to chronic right-sided heart pressures on basis of liver biopsy. Perhaps this is \"residual\" disease\" related to hx prior congestion with decompensation, at which time she develped ascites.     #  History of left-sided Aspergillus empyema, which was first noted on 10/08/2019.  Pleural effusion has nearly resolved (not enough left to safely tap), based on Based on CT chest on 3/9/20--although cannot excluded residual IFI based on imaging. After her 4/8/20 visit, I consulted Transplant ID-->recommended increasing vori dose to 300mg bid x 3 months, which has now been completed. Will repeat CT and discuss duration of treatment with Transplant ID>     #  History of CMV viremia.  Her most recent CMV PCR was on 02/11/2020 and was below the detectable level in their lab of 35 copies per mL.  Most " "recent blood CMV PCR on 6/30/20 was negative.     #  History of EBV viremia, with most recent result 8,918 cpml (3/9/20).    #  Reflux prophylaxis--patient currently on omeprazole 40mg bid and is asymptomatic-->decrease to 40mg every day.     #  Dialysis-dependent renal failure.  She reports good urine output but her dialysis team is dubious that her kidneys will improved. She has been told that she will need to have fistula if she is going to be on long-term dialysis. Also interested in learning more about transplantation. Will request appt with Dr. Gamble, who has seen patient in the past.     #  History of hypertension, being treated with amlodipine and  carvedilol. Blood pressure at dialysis center has been \"okay\" according to the patient.    Please also refer to RN Transplant Coordinator note for additional information related to this visit.    PATIENT PROFILE AND TRANSPLANT HISTORY:  Current age:                    59 year old  Underlying lung disease: IIP: Nonspecific Interstitial Pneumonia    Transplant date(s):        3/1/2018 (Lung)  Transplant POD(s):        866  Lung transplant type(s):       Transplant coordinator:   Mikayla Latham  Transplant provider(s):   Elsy Mccann, Ame Chow, Toby Hernandez, Nicho Hood    ALLERGIES/INTOLERANCES/SENSITIVITIES  Patient has no known allergies.    Active Patient Thresholds     Lab Low High Effective Since Comment    Tacrolimus Level 8 10 06/06/2019 6/6/19 CP          INTERVAL HISTORY:    --Transplant-related lab/procedure results  # Most recent resulted PRA: 2/19/20, neg for DSA  # Most recent bronchoscopy: >1 year  # Most recent transbronchial biopsy: >1 year  # Most recent blood CMV PCR: 6/30/20, neg  # Most recent blood EBV PCR: 3/9/20, +8,918 cpm    --Most recent lung transplant clinic visit: 4/8/20 ([facility])    --Interval clinic visits, significant medical events (obtained by chart review and patient hx):    6/17/20: Hepatology " ([facility]): no new recs    --Today:    Ms. Blue was evaluated today via video visit ([employer] website). Also present on the call were the patient's , Ranjan; and lung transplant coordinator, Mikayla Latham RN.    The patient reports that her breathing has been at baseline recently; specifically she is only short of breath with exertion.  She is not having hemoptysis, purulent sputum production, chest pain, wheezing.  She has a nonproductive cough when she wakes up in the morning but that clears up after a short time.    REVIEW OF SYSTEMS:  #Appetite is good and she is actually gaining a little bit of weight    #She is not having nausea, heartburn, vomitin,g or diarrhea    She is continuing 3 times per week hemodialysis which she is tolerating well    #She is not having abdominal swelling or significant leg edema.    #Complete review of systems was asked and was otherwise negative.      PHYSICAL EXAM: Patient appeared comfortable, coherent, and not short of breath.    I have reviewed and updated the patient's Past Medical History, Social History, Family History and Medication List.    STUDIES STILL PENDING AT THE TIME OF THIS NOTE:  Unresulted Labs Ordered in the Past 30 Days of this Admission     No orders found from 6/15/2020 to 7/16/2020.                   Medications:     Current Outpatient Medications   Medication     albuterol (PROAIR HFA/PROVENTIL HFA/VENTOLIN HFA) 108 (90 Base) MCG/ACT inhaler     amLODIPine (NORVASC) 5 MG tablet     calcium-vitamin D (CALTRATE) 600-400 MG-UNIT per tablet     carvedilol (COREG) 25 MG tablet     dapsone (ACZONE) 100 MG tablet     ferrous sulfate (FEROSUL) 325 (65 Fe) MG tablet     magnesium oxide (MAG-OX) 400 MG tablet     multivitamin, therapeutic with minerals (THERA-VIT-M) TABS tablet     ondansetron (ZOFRAN) 4 MG tablet     pantoprazole (PROTONIX) 40 MG EC tablet     predniSONE 2.5 MG PO tablet     tacrolimus (GENERIC EQUIVALENT) 0.5 MG capsule     voriconazole (VFEND)  "200 MG tablet     voriconazole (VFEND) 50 MG tablet     No current facility-administered medications for this visit.          ATTESTATION    Kecia Blue is a 59 year old female who is having lung transplant follow-up via a billable telephone/video visit.     I have reviewed the note as documented above.  This accurately captures the substance of my conversation with the patient.    Telephone/video contact time: 31 minutes      Complexity indicators:    --immune compromised, on high-risk medications x   --organ transplant recipient x   --multiple organ transplant recipient    --active respiratory infection    --within one year of transplant; and/or within one month of hospitalization    --chronic lung allograft dysfunction syndrome (CLAD, chronic rejection, or bronchiolitis obliterans syndrome)    --new medical problem addressed during this visit    --multiple active medical problems x   --admitted directly to hospital from this clinic visit    -->50% of this visit was spent in counseling and care coordination. If yes, total visit time was         Srinivas Mcelroy MD  PAC    The patient has been notified of following: \"This telephone/video visit will be conducted via a call between you and your physician/provider. We have found that certain health care needs can be provided without the need for a physical exam.  This service lets us provide the care you need with a telephone/video conversation.  If a prescription is necessary we can send it directly to your pharmacy.  If lab work is needed we can place an order for that and you can then stop by our lab to have the test done at a later time. If during the course of the visit the physician/provider feels a telephone/video visit is not appropriate, you will not be charged for this service.\"       Pre-visit documentation completed by Erich Chand, EMT:    Kecia Blue is a 59 year old female who is being evaluated via a billable video visit.      Again, " thank you for allowing me to participate in the care of your patient.      Sincerely,      Srinivas Mcelroy MD

## 2020-07-15 NOTE — NURSING NOTE
Transplant Coordinator Note     Reason for visit: Post lung transplant follow up visit   Coordinator: Present (video)  Caregiver: Ranjan, .      Health concerns addressed today:  1. Doing well. Hopes to be off dialysis.   2. LFTs stable. No abd swelling.   3. Hoping to stop voriconazole.   4. Notes to be gaining a little weight.   5. [facility] if CT is needed.   6. Coughs up phlegm in the AM.      Activity/rehab: [place]  Oxygen needs: None  High risk donor: Yes  CMV status: D+/R+  DVT/PE: Hx DVT  Post op AFIB/follow up with EP: One time occurrence of a-fib  PJP prophylactic: Dapsone     Pt Education: medications (use/dose/side effects), how/when to call coordinator, frequency of labs, s/s of infection/rejection, call prior to starting any new medications, lab/vital sign book     Health Maintenance:     Last colonoscopy:     Next colonoscopy due:     Dermatology:    Vaccinations this visit:      Labs, CXR, PFTs reviewed with patient  Medication record reviewed and reconciled  Questions and concerns addressed     Patient Instructions  1. We will check if infectious disease is okay with stopping voriconazole once we review the CT scan.  2. We will also talk to Dr. Gamble about how you are doing/arrange an appointment.   3. We will check a CD4 count with your next labs to see if you can stop Dapsone.   4. Mikayla will send orders for a CT scan to [facility].   5. We will ask [facility] to call you re insurance.     Next transplant clinic appointment: 3 months with CXR, labs and PFTs (in person)  Next labs: every 2 weeks     AVS on MyChart

## 2020-07-15 NOTE — LETTER
PHYSICIAN ORDERS      DATE & TIME ISSUED: July 15, 2020 9:29 AM  PATIENT NAME: Kecia Blue   : 1960     [facility] MR# [if applicable]: [id number]     DIAGNOSIS:  Lung Transplant  Z94.2  Please check t-cell subset profile (specifically need absolute CD4 count) with next labs      Any questions please call: Mikayla     Please fax these results to [phone].      .

## 2020-07-15 NOTE — TELEPHONE ENCOUNTER
Per akbar Stark to decrease Protonix to 40 mg daily. Pt to monitor for early satiety or acid reflux sx.     Tacrolimus level 5.5 at 12 hours, on 7/14/20  Goal 8-10.   Current dose 0.5 mg in AM, 0.5 mg in PM    Dose changed to 0.5 mg in AM, 1 mg in PM   Recheck level in 7-14 days    Discussed with patient.

## 2020-07-23 ENCOUNTER — TELEPHONE (OUTPATIENT)
Dept: TRANSPLANT | Facility: CLINIC | Age: 60
End: 2020-07-23

## 2020-07-23 NOTE — TELEPHONE ENCOUNTER
Transplant Social Work Services      Per transplant coordinator, patient will need new insurance at end of year and would like to discuss.  Emailed patient and her  today to set an appt time.  Await their response.

## 2020-07-27 ENCOUNTER — TELEPHONE (OUTPATIENT)
Dept: TRANSPLANT | Facility: CLINIC | Age: 60
End: 2020-07-27

## 2020-07-27 NOTE — TELEPHONE ENCOUNTER
Recent chest CT showing worsening LLL lesion that is believed to be in the pleural space. Dr. Mcelroy discussed with IP team and plan to biopsy. Awaiting plan for additional testing per ID. Patient to continue voriconazole for now. She would like to discuss further with Dr. Mcelroy so will arrange a video visit next week.   Patient reports she got a call last week for a pre kidney transplant coordinator. Wanted to wait until things with her lung were figured out before proceeding further, but Kecia has the coordinator's contact info for the future. Per staff message Dr. Mcelroy agreeable to this as well.

## 2020-07-27 NOTE — TELEPHONE ENCOUNTER
July 27, 2020 2:58 PM -  AIVERSE1: called pt to [facility] minh burden appt /confirmed w pt   
DISPLAY PLAN FREE TEXT

## 2020-07-28 ENCOUNTER — TELEPHONE (OUTPATIENT)
Dept: TRANSPLANT | Facility: CLINIC | Age: 60
End: 2020-07-28

## 2020-07-28 DIAGNOSIS — Z94.2 LUNG REPLACED BY TRANSPLANT (H): ICD-10-CM

## 2020-07-28 PROCEDURE — 80197 ASSAY OF TACROLIMUS: CPT | Performed by: PHYSICIAN ASSISTANT

## 2020-07-28 NOTE — TELEPHONE ENCOUNTER
"Patient calls to ask if planing on doing a biopsy of LLL lesion, can it be scheduled before seeing Dr. Mcelroy next week.   Per patient's coordinator note from yesterday, \"awaiting plan for additional testing per ID\".   Told patient would follow up with her coordinator and see if we can get biopsy scheduled sooner than later.   Patient in agreement with plan. Will call with further questions/concerns.   "

## 2020-07-29 LAB
TACROLIMUS BLD-MCNC: 8.8 UG/L (ref 5–15)
TME LAST DOSE: NORMAL H

## 2020-07-30 NOTE — RESULT ENCOUNTER NOTE
Armand Mcdonnell, your tacrolimus level was 8.8 at 12 hours on 7/28/20 which is within your goal range of 8-10. No dose change at this time. Please call the transplant office ([phone]) with any questions. Thanks, Mikayla

## 2020-07-31 ENCOUNTER — TELEPHONE (OUTPATIENT)
Dept: TRANSPLANT | Facility: CLINIC | Age: 60
End: 2020-07-31

## 2020-07-31 NOTE — TELEPHONE ENCOUNTER
CD4 count 473 on 7/28/20. Per Dr. Mcelroy, okay to stop Dapsone. Patient notified and agrees with plan.

## 2020-07-31 NOTE — TELEPHONE ENCOUNTER
DIAGNOSIS: Lung biopsy consult    DATE RECEIVED: 8.3.20   NOTES STATUS DETAILS   OFFICE NOTE from referring provider Internal 7.27.20, 7.15.20  Dr. Srinivas Mcelroy  [facility]   OFFICE NOTE from other specialist N/A    DISCHARGE SUMMARY from hospital N/A    DISCHARGE REPORT from the ER N/A    OPERATIVE REPORT N/A    MEDICATION LIST Internal    XRAYS (IMAGES & REPORTS) Pacs 7.17.20  CT Chest   PATHOLOGY  Slides & report N/A

## 2020-08-03 ENCOUNTER — PRE VISIT (OUTPATIENT)
Dept: INTERVENTIONAL RADIOLOGY/VASCULAR | Facility: CLINIC | Age: 60
End: 2020-08-03

## 2020-08-03 ENCOUNTER — VIRTUAL VISIT (OUTPATIENT)
Dept: INTERVENTIONAL RADIOLOGY/VASCULAR | Facility: CLINIC | Age: 60
End: 2020-08-03
Payer: COMMERCIAL

## 2020-08-03 ENCOUNTER — TELEPHONE (OUTPATIENT)
Dept: TRANSPLANT | Facility: CLINIC | Age: 60
End: 2020-08-03

## 2020-08-03 DIAGNOSIS — R91.1 LESION OF LEFT LUNG: Primary | ICD-10-CM

## 2020-08-03 DIAGNOSIS — Z94.2 S/P LUNG TRANSPLANT (H): ICD-10-CM

## 2020-08-03 DIAGNOSIS — Z11.59 ENCOUNTER FOR SCREENING FOR OTHER VIRAL DISEASES: Primary | ICD-10-CM

## 2020-08-03 DIAGNOSIS — Z99.2 HEMODIALYSIS PATIENT (H): ICD-10-CM

## 2020-08-03 NOTE — TELEPHONE ENCOUNTER
DATE:  8/3/2020   TIME OF RECEIPT FROM LAB:  3:00  LAB TEST:  Alk Phos  LAB VALUE:  570  RESULTS GIVEN WITH READ-BACK TO (PROVIDER):  Mikayla Latham  TIME LAB VALUE REPORTED TO PROVIDER:   3:03

## 2020-08-03 NOTE — LETTER
8/3/2020       RE: Kecia Blue  [address] [place] [address]     Dear Colleague,    Thank you for referring your patient, Kecia Blue, to the [facility] INTERVENTIONAL RADIOLOGY at [facility]. Please see a copy of my visit note below.    Kecia is a 59 you female who is being evaluated via a billable telephone visit.         Call started at  3:20 PM  Call ended at  3:55 PM  ___________________________________________________  Interventional Radiology Consult    First Name: Kecia   Age: 59 year old   Referring Physician: Dr. Mcelroy   REASON FOR REFERRAL: consult for lung mass biopsy    Assessment:  Kecia is a 58 yo s/p bilateral single lung transplant.in 2018.  Recent CT shows a left lower lobe lung mass that has increased in size, biopsy is requested to evaluate for PTLD vs infectiou    Plan:  Image guided biopsy of left lower lobe lung mass  Send for H/E, GMS, AFB stains. Routine, fungal, AFB, norcardia cultures (Dr. Mcelroy)  From Dr. Mcelroy:  If this turns out to be liquid send for cell count and diff as well.  One other thought would be to save residual specimen for Explify, the next generation sequencing platform for respiratory specimens.  I usually enter it as a lab misc order. (Dr. 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 This has been complicated by right main bronchial stenosis that was treated with dilation, most recently in 3/2019. She is currently on 2-drug immune suppression (tacrolimus and prednisone) because of hx of leukopenia, liver dysfunction, elevated alk phos and recurrent infections (CMV, aspergillus).    CT chest 7/18/20:   There is a more focal hypodense masslike   region in the medial left lower lung which measures 4.5 x 4.5 x 6.5 cm,  previously 3.6 x 3.2 cm.  Dr. Mcelroy presented the patient's case at lung nodule conference.  Dr. Ayers from Interventional Radiology was present and approved the biopsy.    PAST MEDICAL HISTORY:   Past Medical History:   Diagnosis Date     Abdominal pain 10/6/2019     Acute on chronic respiratory failure with hypoxia (H) 2/21/2018     Antisynthetase syndrome (H) 2014     Chronic cough      Chronic infection     recent C diff  8/18     Dehydration 8/1/2018     Dehydration 8/1/2018     Dermatomyositis (H)      Dysplasia of cervix, low grade (SETRADA 1)      ILD (interstitial lung disease) (H)      Nausea and vomiting 12/4/2018     Osteopenia      PONV (postoperative nausea and vomiting)      Pulmonary hypertension (H)      Raynaud's disease      Seronegative rheumatoid arthritis (H)      PAST SURGICAL HISTORY:   Past Surgical History:   Procedure Laterality Date     BRONCHOSCOPY (RIGID OR FLEXIBLE), DIAGNOSTIC N/A 4/10/2018    Procedure: COMBINED BRONCHOSCOPY (RIGID OR FLEXIBLE), LAVAGE;;  Surgeon: Mariposa Donohue MD;  Location: [facility]     BRONCHOSCOPY (RIGID OR FLEXIBLE), DILATE BRONCHUS / TRACHEA N/A 10/11/2018    Procedure: BRONCHOSCOPY (RIGID OR FLEXIBLE), DILATE BRONCHUS / TRACHEA;  Flexible And Rigid Bronchoscopy and Dilation;  Surgeon: Wilber Lin MD;  [facility]     BRONCHOSCOPY FLEXIBLE N/A 3/13/2018    Procedure: BRONCHOSCOPY FLEXIBLE;  Flexible Bronchoscopy ;  Surgeon: Gissell Sanchez MD;  [facility]     BRONCHOSCOPY FLEXIBLE N/A 5/9/2018    Procedure: BRONCHOSCOPY FLEXIBLE;;  Surgeon: Wilber Lin MD;  [facility]     BRONCHOSCOPY FLEXIBLE AND RIGID N/A 9/10/2018    Procedure: BRONCHOSCOPY FLEXIBLE AND RIGID;  Flexible and Rigid Bronchoscopy with Balloon Dilation, tissue debulking with cryo, and Right mainstem bronchus stent placement;  Surgeon: Wilber Lin MD;  [facility]     BRONCHOSCOPY RIGID N/A 6/6/2018    Procedure: BRONCHOSCOPY RIGID;;   Surgeon: Lopez Macias MD;  [facility]     BRONCHOSCOPY, DILATE BRONCHUS, STENT BRONCHUS, COMBINED N/A 6/11/2018    Procedure: COMBINED BRONCHOSCOPY, DILATE BRONCHUS, STENT BRONCHUS;  Flexible Bronchoscopy, Balloon Dilation, Bronchial Washings;  Surgeon: Wilber Lin MD;  [facility]     BRONCHOSCOPY, DILATE BRONCHUS, STENT BRONCHUS, COMBINED Right 7/10/2018    Procedure: COMBINED BRONCHOSCOPY, DILATE BRONCHUS, STENT BRONCHUS;  Flexible Bronchoscopy, Balloon Dilation, Bronchial Washings  ;  Surgeon: Wilber Lin MD;  [facility]     BRONCHOSCOPY, DILATE BRONCHUS, STENT BRONCHUS, COMBINED N/A 8/2/2018    Procedure: COMBINED BRONCHOSCOPY, DILATE BRONCHUS, STENT BRONCHUS;  Flexible Bronchoscopy, Bronchial Washings, Balloon Dilation;  Surgeon: Wilber Lin MD;  [facility]     BRONCHOSCOPY, DILATE BRONCHUS, STENT BRONCHUS, COMBINED N/A 8/20/2018    Procedure: COMBINED BRONCHOSCOPY, DILATE BRONCHUS, STENT BRONCHUS;  Flexible Bronchoscopy, Balloon Dilation;  Surgeon: Wilber Lin MD;  [facility]     BRONCHOSCOPY, DILATE BRONCHUS, STENT BRONCHUS, COMBINED N/A 10/29/2018    Procedure: Flexible Bronchoscopy, Balloon Dilation, Stent Revision, Airway Examination And Therapeutic Suctioning, Cyro Tumor Debulking;  Surgeon: Wilber Lin MD;  [facility]     BRONCHOSCOPY, DILATE BRONCHUS, STENT BRONCHUS, COMBINED N/A 11/20/2018    Procedure: Rigid Bronchoscopy, Stent Removal and dilitation;  Surgeon: Wilber Lin MD;  [facility]     BRONCHOSCOPY, DILATE BRONCHUS, STENT BRONCHUS, COMBINED N/A 12/14/2018    Procedure: Flexible And Rigid Bronchoscopy, Balloon Dilation, Bronchial Washing;  Surgeon: Wilber Lin MD;  [facility]     BRONCHOSCOPY, DILATE BRONCHUS, STENT BRONCHUS, COMBINED N/A 1/17/2019    Procedure: Flexible And Rigid Bronchoscopy, Balloon Dilation.;  Surgeon: Wilber Lin MD;  [facility]      BRONCHOSCOPY, DILATE BRONCHUS, STENT BRONCHUS, COMBINED N/A 3/7/2019    Procedure: Flexible and Rigid Bronchoscopy, Bronchial Washing, Balloon Dilation;  Surgeon: Wilber Lin MD;  [facility]     BRONCHOSCOPY, DILATE BRONCHUS, STENT BRONCHUS, COMBINED N/A 6/6/2019    Procedure: Rigid and Flexible Bronchoscopy, Balloon Dilation;  Surgeon: Wilebr Lin MD;  [facility]     BRONCHOSCOPY, DILATE BRONCHUS, STENT BRONCHUS, COMBINED N/A 10/11/2019    Procedure: Flexible and Rigid Bronchoscopy, Balloon Dilation, Bronchoalveolar Lagave;  Surgeon: Wilber Lin MD;  [facility]     CV RIGHT HEART CATH N/A 3/10/2020    Procedure: CV RIGHT HEART CATH;  Surgeon: Wai Garcia MD;  Location:  HEART CARDIAC CATH LAB     ENT SURGERY      tonsillectomy as a child     ESOPHAGOSCOPY, GASTROSCOPY, DUODENOSCOPY (EGD), COMBINED N/A 10/29/2018    Procedure: COMBINED ESOPHAGOSCOPY, GASTROSCOPY, DUODENOSCOPY (EGD) with biopsies and polypectomy;  Surgeon: Chente Bloom MD;  [facility]     INSERT EXTRACORPORAL MEMBRANE OXYGENATOR ADULT (OUTSIDE OR) N/A 2/27/2018    Procedure: INSERT EXTRACORPORAL MEMBRANE OXYGENATOR ADULT (OUTSIDE OR);  INSERT EXTRACORPORAL MEMBRANE OXYGENATOR ADULT (OUTSIDE OR) ;  Surgeon: Toby Hernandez MD;  [facility]     IR CVC TUNNEL PLACEMENT > 5 YRS OF AGE  10/25/2019     IR PARACENTESIS  1/8/2020     IR THORACENTESIS  9/13/2019     IR TRANSCATHETER BIOPSY  1/8/2020     no prior surgery       REMOVE EXTRACORPORAL MEMBRANE OXYGENATOR ADULT N/A 3/3/2018    Procedure: REMOVE EXTRACORPORAL MEMBRANE OXYGENATOR ADULT;  Removal of Right Femoral Venous and Right Axillary Arterial Extracorporeal Membrane Oxygenator;  Surgeon: Toby Hernandez MD;  [facility]     TRANSPLANT LUNG RECIPIENT SINGLE X2 Bilateral 3/1/2018    Procedure: TRANSPLANT LUNG RECIPIENT SINGLE X2;  Median Sternotomy, Extracorporeal Membrane Oxygenator, bilateral  sequential lung transplant;  Surgeon: Toby Hernandez MD;  Location:  OR     FAMILY HISTORY:   Family History   Problem Relation Age of Onset     Hypertension Mother      Arthritis Mother      Pancreatic Cancer Father      Diabetes Father      SOCIAL HISTORY:   Social History     Tobacco Use     Smoking status: Never Smoker     Smokeless tobacco: Never Used   Substance Use Topics     Alcohol use: No     Alcohol/week: 1.0 standard drinks     Types: 1 Glasses of wine per week     PROBLEM LIST:   Patient Active Problem List    Diagnosis Date Noted     Hemodialysis patient (H) 08/10/2020     Priority: Medium     Patient has a tunneled catheter in place for hemodialysis  She has dialysis 3 times a week M-W-F in [place]       Shortness of breath 01/22/2020     Priority: Medium     Added automatically from request for surgery [id number]       Pulmonary hypertension (H) 01/22/2020     Priority: Medium     Added automatically from request for surgery [id number]       RADHA (acute kidney injury) (H) 10/21/2019     Priority: Medium     Empyema of lung (H) 09/13/2019     Priority: Medium     Administration of long-term prophylactic antibiotics 09/13/2019     Priority: Medium     Cholecystitis 09/12/2019     Priority: Medium     Low hemoglobin 12/13/2018     Priority: Medium     Nausea and vomiting 12/04/2018     Priority: Medium     Dehydration 08/01/2018     Priority: Medium     Acute kidney injury (H) 08/01/2018     Priority: Medium     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 Thanks!)      Transplant providers,  see Epic [place] for additional detailed information      Transplant Hospitalization Summary:  Bilateral Sequential Lung Transplant       Involved in a trial? (ex. INSPIRE, EXPAND):     Notable Intra-Operative Information: None      DONOR Information:    CDC Increased Risk: Y/N?      PATIENT Information:  Serologies:     Donor Recipient Intervention   CMV status      EBV status      HSV status        Transplant Complications:   PRE-op (Y/N) POST-op (Y/N)    Trach      Vent support      Chest tube  n/a    ECMO   Decannulation date: &&&     Primary Graft Dysfunction Documentation:   POD#1   (0-24 hours) POD#2   (25-48 hours) POD#3   (49-72 hours)   Date of data      Time of data      Intubated? Y/N Y/N Y/N   PaO2      FiO2      P/F Ratio      PGD Grade   (0=mild, 3=severe)      ECMO? Y/N Y/N Y/N   Inhaled NO? Y/N Y/N Y/N   ISHLT PGD Scoring  Grade 0 - PaO2/FiO2 >300 and normal chest radiograph (must be extubated to be Grade 0)  Grade 1 - PaO2/FiO2 >300 and diffuse allograft infiltrates on chest radiograph  Grade 2 - PaO2/FiO2 between 200 and 300  Grade 3 - PaO2/FiO2 <200    Post-Op Data:  Complication Y/N Date Comments   Date of Extubation(s) N/A     Return to OR?      Reintubated?      Date Last Chest Tube Removed N/A     Rejection?      Afib?      Renal failure?      HCAP?      DVT/PE?      Native lung pathology results        Prednisone Taper Plan:  3/15/18 12.5 12.5   3/22/18 12 10   4/26/18 10 10   5/24/18 10 7.5   6/22/18 7.5 7.5   7/20/18 7.5 5   8/24/18 5 5   9/21/18 5 2.5       Discharge:  Discharge to: &&&Home / TCU / ARU  Discharge date: &&&       ILD (interstitial lung disease) (H) 02/27/2018     Priority: Medium     MEDICATIONS:   Prescription Medications as of 8/10/2020       Rx Number Disp Refills Start End Last Dispensed Date Next Fill Date Owning Pharmacy    albuterol (PROAIR HFA/PROVENTIL HFA/VENTOLIN HFA) 108 (90 Base) MCG/ACT inhaler  1 Inhaler 1 4/8/2020    [facility] [id number] [place] [facility]  [address] [place] [address] [address]    Sig: Inhale 2 puffs into the lungs 2 times daily    Class: E-Prescribe    Notes to Pharmacy: Pharmacy may dispense brand covered by insurance (Proair, or proventil or ventolin or generic albuterol inhaler)    Route: Inhalation    amLODIPine (NORVASC) 5 MG tablet  60 tablet 11 3/9/2020    [facility] [id number] [place] [address]    Sig: Take 2 tablets (10 mg) by mouth daily    Class: Historical    Route: Oral    B Complex-C-Folic Acid (DIALYVITE) TABS    7/12/2020        Sig: Take 1 tablet by mouth daily    Class: Historical    Route: Oral    calcium-vitamin D (CALTRATE) 600-400 MG-UNIT per tablet    8/1/2018        Sig: Take 1 tablet by mouth daily    Class: Historical    Route: Oral    carvedilol (COREG) 25 MG tablet  60 tablet 11 12/18/2019    [id number] [place] [address]    Sig: Take 1 tablet (25 mg) by mouth 2 times daily (with meals)    Class: E-Prescribe    Route: Oral    ferrous sulfate (FEROSUL) 325 (65 Fe) MG tablet  60 tablet 2 9/15/2019    [facility] [place] [address]    Sig: Take 1 tablet (325 mg) by mouth daily (with breakfast)    Class: E-Prescribe    Route: Oral    magnesium oxide (MAG-OX) 400 MG tablet            Sig: Take 400 mg by mouth daily     Class: Historical    Route: Oral    multivitamin, therapeutic with minerals (THERA-VIT-M) TABS tablet  30 each 11 5/24/2018    [facility] [place] [address] [address]    Sig: Take 1 tablet by mouth daily    Class: E-Prescribe    Route: Oral    ondansetron (ZOFRAN) 4 MG tablet  60 tablet 0 12/3/2019    [facility] [id number] [place] [address]    Sig: Take 1 tablet (4 mg) by mouth every 12 hours as needed for nausea    Class: E-Prescribe    Route: Oral    pantoprazole (PROTONIX) 40 MG EC tablet  30 tablet 11 7/15/2020    [place] [address] [address]    Sig:  Take 1 tablet (40 mg) by mouth daily    Class: E-Prescribe    Route: Oral    predniSONE 2.5 MG PO tablet  90 tablet 11 2/19/2020    [id number] [place] [address] [facility] [address]    Sig: Take two tablets (5mg) every AM and one tablet (2.5mg) every evening    Class: E-Prescribe    Notes to Pharmacy: TXP DT 3/1/2018 (Lung) TXP Dischg DT 3/24/2018 DX Lung replaced by transplant Z94.2 [facility] ([place], [place])    tacrolimus (GENERIC EQUIVALENT) 0.5 MG capsule  30 capsule 11 7/15/2020    [place] [id number]    Sig: Take 1 capsule (0.5 mg) by mouth every morning Total dose: 0.5 mg in the AM and 1 mg in the PM    Class: E-Prescribe    Route: Oral    tacrolimus (GENERIC EQUIVALENT) 1 MG capsule  30 capsule 11 7/15/2020    [facility] [address]    Sig: Take 1 capsule (1 mg) by mouth every evening Total dose: 0.5 mg in the AM and 1 mg in the PM    Class: E-Prescribe    Route: Oral    voriconazole (VFEND) 200 MG tablet  60 tablet 2 5/7/2020    [facility] [address] [address]    Sig: Take 1 tablet (200 mg) by mouth 2 times daily Total dose: 300 mg twice daily    Class: E-Prescribe    Route: Oral    voriconazole (VFEND) 50 MG tablet  120 tablet 2 5/7/2020    [facility] [address]    Sig: Take 2 tablets (100 mg) by mouth 2 times daily Total dose: 300 mg twice daily    Class: E-Prescribe    Route: Oral        ALLERGIES: Patient has no known allergies.  VITALS: LMP 06/07/2014 (Exact Date)     ROS:  Skin: negative  Eyes: negative  Ears/Nose/Throat: negative  Respiratory: No shortness of breath, dyspnea on exertion, cough, or hemoptysis  Cardiovascular: negative  Gastrointestinal: negative  Genitourinary: pt is on hemodialysis, still makes urine  Musculoskeletal:  negative  Neurologic: negative  Psychiatric: negative  Hematologic/Lymphatic/Immunologic: negative  Endocrine: negative    Physical Examination: virtual visit  Pleasant, oriented    BMP RESULTS:  Lab Results   Component Value Date     03/10/2020    POTASSIUM 4.2 06/30/2020    CHLORIDE 102 03/10/2020    CO2 31 03/10/2020    ANIONGAP 5 03/10/2020     (A) 06/30/2020    BUN 37 (H) 03/10/2020    CR 2.30 (A) 06/30/2020    GFRESTIMATED 23 06/30/2020    GFRESTBLACK 19 (L) 03/10/2020    CHELSEY 8.6 03/10/2020        CBC RESULTS:  Lab Results   Component Value Date    WBC 6.3 03/10/2020    RBC 3.32 (L) 03/10/2020    HGB 10.3 (L) 03/10/2020    HCT 33.1 (L) 03/10/2020     03/10/2020    MCH 31.0 03/10/2020    MCHC 31.1 (L) 03/10/2020    RDW 14.8 03/10/2020     (L) 03/10/2020       INR/PTT:  Lab Results   Component Value Date    INR 1.03 02/19/2020    PTT 26 08/03/2018       Diagnostic studies: see CT chest    PROVIDER NOTE:  I explained the procedure to Kecia.    This included:  Preparing for the procedure, the actual procedure and recovery.  I explained the risk of the lung biopsy: bleeding, infection,  pneumothorax possible need for a chest tube and overnight stay in the hospital, and pain from the procedure.  I explained that usually the results return after three to four business days.    I explained that they would be contacted by Dr. Mcelroy's office following this to determine a future plan.  Thank you for involving us in the care of your patient.    Juana Luis Manuel MS, APRN, CNS, CRN  Clinical Nurse Specialist  Interventional Radiology  [phone] (voice mail)  [phone] (pager)    CC  Patient Care Team:  Rosy Vu MD as PCP - General (Family Practice)  Carlton Black MD as MD (Rheumatology)  Dee Watkins MD as MD (Rheumatology)  Elsy Mccann NP as Nurse Practitioner (Pulmonary)  Toby Hernandez MD as Transplant Surgeon (Thoracic Diseases)  Judith  Starr, RN as Specialty Care Coordinator (Cardiology)  Jahaira Rios, RN as Specialty Care Coordinator (Cardiology)  Krystle Tate, RN as Specialty Care Coordinator (Cardiology)  LAURA DUDLEY

## 2020-08-05 NOTE — PATIENT INSTRUCTIONS
You are scheduled for your biopsy on Thursday, August 13, 2020  Report to the [facility] Waiting room at 9:30 AM  The [facility] Waiting Room is located on the 2nd floor (street level) of [facility], [address] [place] [address], [facility]  Your procedure is scheduled to start at approximately 11:00 AM    No solid foods or milk products for 6 hours prior on the day of the procedure 5:00 AM  You may have clear liquids until 2 hours prior on the day of the procedure.(water, apple juice, broth, coffee or tea without milk or sugar, jell-o, white grape juice)  9:00 AM    You may take your medications the morning of the procedure    You will need a responsible adult  on the day of the procedure  You may bring one person with you into the hospital on the day of the procedure  Remember to wear your mask into the hospital    If you have any questions you may call the Radiology Nurse Line [phone]    If you come alone into the hospital during Covid 19-  to reach your family member after they are dropped off for an Interventional Radiology appt: Please call the Pre-Procedure location 2A with any questions  at this number: [phone]    Juana Issa  Clinical Nurse Specialist  Interventional Radiology

## 2020-08-05 NOTE — PROGRESS NOTES
Kecia is a 59 you female who is being evaluated via a billable telephone visit.       Please call patient on Home phone.      The patient has been notified of following:      This telephone visit will be conducted via a call between you and your physician/provider. We have found that certain health care needs can be provided without the need for a physical exam.  This service lets us provide the care you need with a short phone conversation.  If a prescription is necessary we can send it directly to your pharmacy.  If lab work is needed we can place an order for that and you can then stop by our lab to have the test done at a later time.     Telephone visits are billed at different rates depending on your insurance coverage. During this emergency period, for some insurers they may be billed the same as an in-person visit.  Please reach out to your insurance provider with any questions.     If during the course of the call the physician/provider feels a telephone visit is not appropriate, you will not be charged for this service.     Physician has received verbal consent for a Telephone Visit from the patient? Yes     How would you like to obtain your AVS?  My chart    Call started at  3:20 PM  Call ended at  3:55 PM  ___________________________________________________  Interventional Radiology Consult    First Name: Kecia   Age: 59 year old   Referring Physician: Dr. Mcelroy   REASON FOR REFERRAL: consult for lung mass biopsy    Assessment:  Kecia is a 60 yo s/p bilateral single lung transplant.in 2018.  Recent CT shows a left lower lobe lung mass that has increased in size, biopsy is requested to evaluate for PTLD vs infectiou    Plan:  Image guided biopsy of left lower lobe lung mass  Send for H/E, GMS, AFB stains. Routine, fungal, AFB, norcardia cultures (Dr. Mcelroy)  From Dr. Mcelroy:  If this turns out to be liquid send for cell count and diff as well.  One other thought would be to save residual specimen for  Explify, the next generation sequencing platform for respiratory specimens.  I usually enter it as a lab misc order. (Dr. 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 This has been complicated by right main bronchial stenosis that was treated with dilation, most recently in 3/2019. She is currently on 2-drug immune suppression (tacrolimus and prednisone) because of hx of leukopenia, liver dysfunction, elevated alk phos and recurrent infections (CMV, aspergillus).    CT chest 7/18/20:   There is a more focal hypodense masslike   region in the medial left lower lung which measures 4.5 x 4.5 x 6.5 cm, previously 3.6 x 3.2 cm.  Dr. Mcelroy presented the patient's case at lung nodule conference.  Dr. Ayers from Interventional Radiology was present and approved the biopsy.    PAST MEDICAL HISTORY:   Past Medical History:   Diagnosis Date     Abdominal pain 10/6/2019     Acute on chronic respiratory failure with hypoxia (H) 2/21/2018     Antisynthetase syndrome (H) 2014     Chronic cough      Chronic infection     recent C diff  8/18     Dehydration 8/1/2018     Dehydration 8/1/2018     Dermatomyositis (H)      Dysplasia of cervix, low grade (ESTRADA 1)      ILD (interstitial lung disease) (H)      Nausea and vomiting 12/4/2018     Osteopenia      PONV (postoperative nausea and vomiting)      Pulmonary hypertension (H)      Raynaud's disease      Seronegative rheumatoid arthritis (H)      PAST SURGICAL HISTORY:   Past Surgical History:   Procedure Laterality Date     BRONCHOSCOPY (RIGID OR FLEXIBLE), DIAGNOSTIC N/A 4/10/2018    Procedure: COMBINED BRONCHOSCOPY (RIGID OR FLEXIBLE), LAVAGE;;  Surgeon: Mariposa Donohue MD;  Location: [facility]     BRONCHOSCOPY (RIGID OR FLEXIBLE), DILATE BRONCHUS / TRACHEA N/A 10/11/2018    Procedure:  BRONCHOSCOPY (RIGID OR FLEXIBLE), DILATE BRONCHUS / TRACHEA;  Flexible And Rigid Bronchoscopy and Dilation;  Surgeon: Wilber Lin MD;  [facility]     BRONCHOSCOPY FLEXIBLE N/A 3/13/2018    Procedure: BRONCHOSCOPY FLEXIBLE;  Flexible Bronchoscopy ;  Surgeon: Gissell Sanchez MD;  [facility]     BRONCHOSCOPY FLEXIBLE N/A 5/9/2018    Procedure: BRONCHOSCOPY FLEXIBLE;;  Surgeon: Wilber Lin MD;  [facility]     BRONCHOSCOPY FLEXIBLE AND RIGID N/A 9/10/2018    Procedure: BRONCHOSCOPY FLEXIBLE AND RIGID;  Flexible and Rigid Bronchoscopy with Balloon Dilation, tissue debulking with cryo, and Right mainstem bronchus stent placement;  Surgeon: Wilber iLn MD;  [facility]     BRONCHOSCOPY RIGID N/A 6/6/2018    Procedure: BRONCHOSCOPY RIGID;;  Surgeon: Lopez Macias MD;  [facility]     BRONCHOSCOPY, DILATE BRONCHUS, STENT BRONCHUS, COMBINED N/A 6/11/2018    Procedure: COMBINED BRONCHOSCOPY, DILATE BRONCHUS, STENT BRONCHUS;  Flexible Bronchoscopy, Balloon Dilation, Bronchial Washings;  Surgeon: Wilber Lin MD;  [facility]     BRONCHOSCOPY, DILATE BRONCHUS, STENT BRONCHUS, COMBINED Right 7/10/2018    Procedure: COMBINED BRONCHOSCOPY, DILATE BRONCHUS, STENT BRONCHUS;  Flexible Bronchoscopy, Balloon Dilation, Bronchial Washings  ;  Surgeon: Wilber Lin MD;  [facility]     BRONCHOSCOPY, DILATE BRONCHUS, STENT BRONCHUS, COMBINED N/A 8/2/2018    Procedure: COMBINED BRONCHOSCOPY, DILATE BRONCHUS, STENT BRONCHUS;  Flexible Bronchoscopy, Bronchial Washings, Balloon Dilation;  Surgeon: Wilber Lin MD;  [facility]     BRONCHOSCOPY, DILATE BRONCHUS, STENT BRONCHUS, COMBINED N/A 8/20/2018    Procedure: COMBINED BRONCHOSCOPY, DILATE BRONCHUS, STENT BRONCHUS;  Flexible Bronchoscopy, Balloon Dilation;  Surgeon: Wilber Lin MD;  [facility]     BRONCHOSCOPY, DILATE BRONCHUS, STENT BRONCHUS, COMBINED N/A  10/29/2018    Procedure: Flexible Bronchoscopy, Balloon Dilation, Stent Revision, Airway Examination And Therapeutic Suctioning, Cyro Tumor Debulking;  Surgeon: Wilber Lin MD;  [facility]     BRONCHOSCOPY, DILATE BRONCHUS, STENT BRONCHUS, COMBINED N/A 11/20/2018    Procedure: Rigid Bronchoscopy, Stent Removal and dilitation;  Surgeon: Wilber Lin MD;  [facility]     BRONCHOSCOPY, DILATE BRONCHUS, STENT BRONCHUS, COMBINED N/A 12/14/2018    Procedure: Flexible And Rigid Bronchoscopy, Balloon Dilation, Bronchial Washing;  Surgeon: Wilber Lin MD;  [facility]     BRONCHOSCOPY, DILATE BRONCHUS, STENT BRONCHUS, COMBINED N/A 1/17/2019    Procedure: Flexible And Rigid Bronchoscopy, Balloon Dilation.;  Surgeon: Wilber Lin MD;  [facility]     BRONCHOSCOPY, DILATE BRONCHUS, STENT BRONCHUS, COMBINED N/A 3/7/2019    Procedure: Flexible and Rigid Bronchoscopy, Bronchial Washing, Balloon Dilation;  Surgeon: Wilber Lin MD;  [facility]     BRONCHOSCOPY, DILATE BRONCHUS, STENT BRONCHUS, COMBINED N/A 6/6/2019    Procedure: Rigid and Flexible Bronchoscopy, Balloon Dilation;  Surgeon: Wilber Lin MD;  [facility]     BRONCHOSCOPY, DILATE BRONCHUS, STENT BRONCHUS, COMBINED N/A 10/11/2019    Procedure: Flexible and Rigid Bronchoscopy, Balloon Dilation, Bronchoalveolar Lagave;  Surgeon: Wilber Lin MD;  [facility]     CV RIGHT HEART CATH N/A 3/10/2020    Procedure: CV RIGHT HEART CATH;  Surgeon: Wai Garcia MD;  [facility]     ENT SURGERY      tonsillectomy as a child     ESOPHAGOSCOPY, GASTROSCOPY, DUODENOSCOPY (EGD), COMBINED N/A 10/29/2018    Procedure: COMBINED ESOPHAGOSCOPY, GASTROSCOPY, DUODENOSCOPY (EGD) with biopsies and polypectomy;  Surgeon: Chente Bloom MD;  [facility]     INSERT EXTRACORPORAL MEMBRANE OXYGENATOR ADULT (OUTSIDE OR) N/A 2/27/2018    Procedure: INSERT  EXTRACORPORAL MEMBRANE OXYGENATOR ADULT (OUTSIDE OR);  INSERT EXTRACORPORAL MEMBRANE OXYGENATOR ADULT (OUTSIDE OR) ;  Surgeon: Toby Hernandez MD;  [facility]     IR CVC TUNNEL PLACEMENT > 5 YRS OF AGE  10/25/2019     IR PARACENTESIS  1/8/2020     IR THORACENTESIS  9/13/2019     IR TRANSCATHETER BIOPSY  1/8/2020     no prior surgery       REMOVE EXTRACORPORAL MEMBRANE OXYGENATOR ADULT N/A 3/3/2018    Procedure: REMOVE EXTRACORPORAL MEMBRANE OXYGENATOR ADULT;  Removal of Right Femoral Venous and Right Axillary Arterial Extracorporeal Membrane Oxygenator;  Surgeon: Toby Hernandez MD;  [facility]     TRANSPLANT LUNG RECIPIENT SINGLE X2 Bilateral 3/1/2018    Procedure: TRANSPLANT LUNG RECIPIENT SINGLE X2;  Median Sternotomy, Extracorporeal Membrane Oxygenator, bilateral sequential lung transplant;  Surgeon: Toby Hernandez MD;  [facility]     FAMILY HISTORY:   Family History   Problem Relation Age of Onset     Hypertension Mother      Arthritis Mother      Pancreatic Cancer Father      Diabetes Father      SOCIAL HISTORY:   Social History     Tobacco Use     Smoking status: Never Smoker     Smokeless tobacco: Never Used   Substance Use Topics     Alcohol use: No     Alcohol/week: 1.0 standard drinks     Types: 1 Glasses of wine per week     PROBLEM LIST:   Patient Active Problem List    Diagnosis Date Noted     Hemodialysis patient (H) 08/10/2020     Priority: Medium     Patient has a tunneled catheter in place for hemodialysis  She has dialysis 3 times a week M-W-F in [place]       Shortness of breath 01/22/2020     Priority: Medium     Added automatically from request for surgery [id number]       Pulmonary hypertension (H) 01/22/2020     Priority: Medium     Added automatically from request for surgery [id number]       RADHA (acute kidney injury) (H) 10/21/2019     Priority: Medium     Empyema of lung (H) 09/13/2019     Priority: Medium     Administration of long-term  prophylactic antibiotics 09/13/2019     Priority: Medium     Cholecystitis 09/12/2019     Priority: Medium     Low hemoglobin 12/13/2018     Priority: Medium     Nausea and vomiting 12/04/2018     Priority: Medium     Dehydration 08/01/2018     Priority: Medium     Acute kidney injury (H) 08/01/2018     Priority: Medium     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 Thanks!)      Transplant providers, see Epic [place] for additional detailed information      Transplant Hospitalization Summary:  Bilateral Sequential Lung Transplant       Involved in a trial? (ex. INSPIRE, EXPAND):     Notable Intra-Operative Information: None      DONOR Information:    CDC Increased Risk: Y/N?      PATIENT Information:  Serologies:     Donor Recipient Intervention   CMV status      EBV status      HSV status        Transplant Complications:   PRE-op (Y/N) POST-op (Y/N)    Trach      Vent support      Chest tube  n/a    ECMO   Decannulation date: &&&     Primary Graft Dysfunction Documentation:   POD#1   (0-24 hours) POD#2   (25-48 hours) POD#3   (49-72 hours)   Date of data      Time of data      Intubated? Y/N Y/N Y/N   PaO2      FiO2      P/F Ratio      PGD Grade   (0=mild, 3=severe)      ECMO? Y/N Y/N Y/N   Inhaled NO? Y/N Y/N Y/N   ISHLT PGD Scoring  Grade 0 - PaO2/FiO2 >300 and normal chest radiograph (must be extubated to be Grade 0)  Grade 1 - PaO2/FiO2 >300 and diffuse allograft infiltrates on chest radiograph  Grade 2 - PaO2/FiO2 between 200 and 300  Grade 3 - PaO2/FiO2 <200    Post-Op Data:  Complication Y/N Date  Comments   Date of Extubation(s) N/A     Return to OR?      Reintubated?      Date Last Chest Tube Removed N/A     Rejection?      Afib?      Renal failure?      HCAP?      DVT/PE?      Native lung pathology results        Prednisone Taper Plan:  3/15/18 12.5 12.5   3/22/18 12 10   4/26/18 10 10   5/24/18 10 7.5   6/22/18 7.5 7.5   7/20/18 7.5 5   8/24/18 5 5   9/21/18 5 2.5       Discharge:  Discharge to: &&&Home / TCU / ARU  Discharge date: &&&       ILD (interstitial lung disease) (H) 02/27/2018     Priority: Medium     MEDICATIONS:   Prescription Medications as of 8/10/2020       Rx Number Disp Refills Start End Last Dispensed Date Next Fill Date Owning Pharmacy    albuterol (PROAIR HFA/PROVENTIL HFA/VENTOLIN HFA) 108 (90 Base) MCG/ACT inhaler  1 Inhaler 1 4/8/2020    [place] [address] [place] [address]    Sig: Inhale 2 puffs into the lungs 2 times daily    Class: E-Prescribe    Notes to Pharmacy: Pharmacy may dispense brand covered by insurance (Proair, or proventil or ventolin or generic albuterol inhaler)    Route: Inhalation    amLODIPine (NORVASC) 5 MG tablet  60 tablet 11 3/9/2020    [facility] [address] [place] [address]    Sig: Take 2 tablets (10 mg) by mouth daily    Class: Historical    Route: Oral    B Complex-C-Folic Acid (DIALYVITE) TABS    7/12/2020        Sig: Take 1 tablet by mouth daily    Class: Historical    Route: Oral    calcium-vitamin D (CALTRATE) 600-400 MG-UNIT per tablet    8/1/2018        Sig: Take 1 tablet by mouth daily    Class: Historical    Route: Oral    carvedilol (COREG) 25 MG tablet  60 tablet 11 12/18/2019    [place] [address] [place] [address]    Sig: Take 1 tablet (25 mg) by mouth 2 times daily (with meals)    Class: E-Prescribe    Route: Oral    ferrous sulfate (FEROSUL) 325 (65 Fe) MG tablet  60 tablet 2 9/15/2019    [facility] [address] [place] [facility] [place] [facility] [address]    Sig: Take 1 tablet (325 mg) by  mouth daily (with breakfast)    Class: E-Prescribe    Route: Oral    magnesium oxide (MAG-OX) 400 MG tablet            Sig: Take 400 mg by mouth daily     Class: Historical    Route: Oral    multivitamin, therapeutic with minerals (THERA-VIT-M) TABS tablet  30 each 11 5/24/2018    [place] [address] [address]    Sig: Take 1 tablet by mouth daily    Class: E-Prescribe    Route: Oral    ondansetron (ZOFRAN) 4 MG tablet  60 tablet 0 12/3/2019    [facility] [address] [place] [address]    Sig: Take 1 tablet (4 mg) by mouth every 12 hours as needed for nausea    Class: E-Prescribe    Route: Oral    pantoprazole (PROTONIX) 40 MG EC tablet  30 tablet 11 7/15/2020    [place] [address] [address]    Sig: Take 1 tablet (40 mg) by mouth daily    Class: E-Prescribe    Route: Oral    predniSONE 2.5 MG PO tablet  90 tablet 11 2/19/2020    [facility] [address] [place] [address]    Sig: Take two tablets (5mg) every AM and one tablet (2.5mg) every evening    Class: E-Prescribe    Notes to Pharmacy: TXP DT 3/1/2018 (Lung) TXP Dischg DT 3/24/2018 DX Lung replaced by transplant Z94.2 [facility] ([place], [place])    tacrolimus (GENERIC EQUIVALENT) 0.5 MG capsule  30 capsule 11 7/15/2020    [place] [id number]    Sig: Take 1 capsule (0.5 mg) by mouth every morning Total dose: 0.5 mg in the AM and 1 mg in the PM    Class: E-Prescribe    Route: Oral    tacrolimus (GENERIC EQUIVALENT) 1 MG capsule  30 capsule 11 7/15/2020    [place] [id number]    Sig: Take 1 capsule (1 mg) by mouth every evening Total dose: 0.5 mg in the AM and 1 mg in the PM    Class: E-Prescribe    Route: Oral    voriconazole (VFEND) 200 MG tablet  60 tablet 2 5/7/2020     [facility] [address]    Sig: Take 1 tablet (200 mg) by mouth 2 times daily Total dose: 300 mg twice daily    Class: E-Prescribe    Route: Oral    voriconazole (VFEND) 50 MG tablet  120 tablet 2 5/7/2020    [facility] [address]    Sig: Take 2 tablets (100 mg) by mouth 2 times daily Total dose: 300 mg twice daily    Class: E-Prescribe    Route: Oral        ALLERGIES: Patient has no known allergies.  VITALS: LMP 06/07/2014 (Exact Date)     ROS:  Skin: negative  Eyes: negative  Ears/Nose/Throat: negative  Respiratory: No shortness of breath, dyspnea on exertion, cough, or hemoptysis  Cardiovascular: negative  Gastrointestinal: negative  Genitourinary: pt is on hemodialysis, still makes urine  Musculoskeletal: negative  Neurologic: negative  Psychiatric: negative  Hematologic/Lymphatic/Immunologic: negative  Endocrine: negative    Physical Examination: virtual visit  Pleasant, oriented    BMP RESULTS:  Lab Results   Component Value Date     03/10/2020    POTASSIUM 4.2 06/30/2020    CHLORIDE 102 03/10/2020    CO2 31 03/10/2020    ANIONGAP 5 03/10/2020     (A) 06/30/2020    BUN 37 (H) 03/10/2020    CR 2.30 (A) 06/30/2020    GFRESTIMATED 23 06/30/2020    GFRESTBLACK 19 (L) 03/10/2020    CHELSEY 8.6 03/10/2020        CBC RESULTS:  Lab Results   Component Value Date    WBC 6.3 03/10/2020    RBC 3.32 (L) 03/10/2020    HGB 10.3 (L) 03/10/2020    HCT 33.1 (L) 03/10/2020     03/10/2020    MCH 31.0 03/10/2020    MCHC 31.1 (L) 03/10/2020    RDW 14.8 03/10/2020     (L) 03/10/2020       INR/PTT:  Lab Results   Component Value Date    INR 1.03 02/19/2020    PTT 26 08/03/2018       Diagnostic studies: see CT chest    PROVIDER NOTE:  I explained the procedure to Kecia.    This included:  Preparing for the procedure, the actual procedure and recovery.  I explained the risk of the lung biopsy: bleeding, infection,   pneumothorax possible need for a chest tube and overnight stay in the hospital, and pain from the procedure.  I explained that usually the results return after three to four business days.    I explained that they would be contacted by Dr. Dudley's office following this to determine a future plan.  Thank you for involving us in the care of your patient.    Juana Issa MS, APRN, CNS, CRN  Clinical Nurse Specialist  Interventional Radiology  [phone] (voice mail)  [phone] (pager)    CC  Patient Care Team:  Rosy Vu MD as PCP - General (Family Practice)  Carlton Black MD as MD (Rheumatology)  Dee Watkins MD as MD (Rheumatology)  Elsy Mccann NP as Nurse Practitioner (Pulmonary)  Toby Hernandez MD as Transplant Surgeon (Thoracic Diseases)  Starr Wong, RN as Specialty Care Coordinator (Cardiology)  Jahaira Rios, RN as Specialty Care Coordinator (Cardiology)  Krystle Tate, RN as Specialty Care Coordinator (Cardiology)  LAURA DUDLEY

## 2020-08-05 NOTE — PROGRESS NOTES
"Kecia Blue is a 59 year old female who is being evaluated via a billable video visit.      The patient has been notified of following:     \"This video visit will be conducted via a call between you and your physician/provider. We have found that certain health care needs can be provided without the need for an in-person physical exam.  This service lets us provide the care you need with a video conversation.  If a prescription is necessary we can send it directly to your pharmacy.  If lab work is needed we can place an order for that and you can then stop by our lab to have the test done at a later time.    Video visits are billed at different rates depending on your insurance coverage.  Please reach out to your insurance provider with any questions.    If during the course of the call the physician/provider feels a video visit is not appropriate, you will not be charged for this service.\"    Patient has given verbal consent for Video visit? Yes  How would you like to obtain your AVS? MyChart  If you are dropped from the video visit, the video invite should be resent to: Text to cell phone: [phone]  Will anyone else be joining your video visit? No      Video-Visit Details    Type of service:  Video Visit    Visit-related time accounting:  *Pre-clinic chart and outside record review: 15 minutes  *Video visit time: 20 minutes  *Post-clinic charting, phone calls, messaging: 15 minutes  *Total time: 50 minutes    Originating Location (pt. Location): Home    Distant Location (provider location):  [facility]ANSPL[facility]     Platform used for Video Visit: Kane Mcelroy MD          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 This has been complicated " "by right main bronchial stenosis that was treated with dilation, most recently in 3/2019. She is currently on 2-drug immune suppression (tacrolimus and prednisone) because of hx of leukopenia, liver dysfunction, elevated alk phos and recurrent infections (CMV, aspergillus).     #  ID prophylaxis: Had been taking dapsone for PJP which was started during her 10/2019 hospitalization (G6PD level is normal), but recent CD4 count was >200 so dapsone was stopped in hopes of avoiding drug-related toxicities.     #  Ascites and portal hypertension, initially thought to be related to chronic right-sided heart pressures on basis of liver biopsy. Pre-lung transplant PA presssure was 98/45, mean 61 mm hg.  However, right heart cath on 3/10/20 showed only mild PAH, with PAP 33/18, mean 24.  Perhaps this is \"residual\" disease\" related to hx prior congestion with decompensation, at which time she develped ascites. Alk phos continues to be mildly elevated, likely due to posaconazole. She has f/u appt with Dr. Denise in Hepatology on 12/21/20.     #  History of left-sided Aspergillus empyema, which was first noted on 10/08/2019and was started on posaconazole.  After her 4/8/20 visit, I consulted Transplant ID-->recommended increasing vori dose to 300mg bid x 3 months, which has now been completed. CT scan was repeated on 7/17/20 and showed increased mass-like density, likely pleural-based, in left lower lung area-->will undergo needle aspiration, scheduled in IR for Thursday 8/13/20. Will order Fungitell and aspergillus galactomannin the same day.     #  History of CMV viremia.  Most recent blood CMV PCR on 6/30/20 was negative-->follow     #  History of EBV viremia, with most recent result 8,918 cpml (3/9/20)-->follow according to protocol     #  Reflux prophylaxis--decreased to 40mg every day at 7/15/20 visit.     #  Dialysis-dependent renal failure.  She reports good urine output but her dialysis team is dubious that her kidneys " "will improve. She recently spoke with one of our kidney txp coordinators-->they will talk in more detail after pleural biopsy results are known.     #  History of hypertension, being treated with amlodipine and  carvedilol. Blood pressure at dialysis center has been \"okay\" according to the patient.    Please also refer to RN Transplant Coordinator note for additional information related to this visit.    PATIENT PROFILE AND TRANSPLANT HISTORY:  Current age:                    59 year old  Underlying lung disease: IIP: Nonspecific Interstitial Pneumonia    Transplant date(s):        3/1/2018 (Lung)  Transplant POD(s):        888  Lung transplant type(s): Bilateral single lung      Transplant coordinator:   Mikayla Umanzor  Transplant provider(s):   Elsy Mccann, Ame Chow, Toby Hernandez, Dom Gunter    ALLERGIES/INTOLERANCES/SENSITIVITIES  Patient has no known allergies.    Active Patient Thresholds     Lab Low High Effective Since Comment    Tacrolimus Level 8 10 06/06/2019 6/6/19 CP          INTERVAL HISTORY:    --Transplant-related lab/procedure results  # Most recent resulted PRA: 2/19/20, neg for DSA  # Most recent bronchoscopy: 6/6/20, showed stenotic right mainstem bronchus, with significant airway obstruction  # Most recent transbronchial biopsy: >1 year  # Most recent blood CMV PCR: 7/28/20, neg  # Most recent blood EBV PCR: positive 3,918 cpm    --Most recent lung transplant clinic visit: 7/15/20 ([facility])    --Interval clinic visits, significant medical events (obtained by chart review and patient hx):    7/17/20: CT chest showed worsening LLL lesion thought to be loculated material in pleural space-->aspiration/biopsy scheduled with IR on 8/13/20 7/31/20: CD4 count 473-->dapsone stopped    --Today:    Ms. Blue was evaluated today via video visit; also present was lung transplant coordinator, Zeny Tony RN    The patient reports that her " "breathing has been unchanged since her most recent appointment with me 3 weeks ago.  She is not having shortness of breath with her daily activities.  She is not having productive cough, hemoptysis, wheezing, chest pain.    REVIEW OF SYSTEMS:  #Appetite is good and she is not having nausea or vomiting    #She is tolerating her dialysis runs well but they are \"of pain\"    #She is not having fever or Reiger's    #She is not having  symptoms.    #Complete review of systems was asked and was otherwise negative.      PHYSICAL EXAM: Patient sounded/appeared comfortable, coherent, and not short of breath.    I have reviewed and updated the patient's Past Medical History, Social History, Family History and Medication List.    STUDIES STILL PENDING AT THE TIME OF THIS NOTE:  Unresulted Labs Ordered in the Past 30 Days of this Admission     No orders found from 7/7/2020 to 8/7/2020.                   Medications:     Current Outpatient Medications   Medication     albuterol (PROAIR HFA/PROVENTIL HFA/VENTOLIN HFA) 108 (90 Base) MCG/ACT inhaler     amLODIPine (NORVASC) 5 MG tablet     calcium-vitamin D (CALTRATE) 600-400 MG-UNIT per tablet     carvedilol (COREG) 25 MG tablet     ferrous sulfate (FEROSUL) 325 (65 Fe) MG tablet     magnesium oxide (MAG-OX) 400 MG tablet     multivitamin, therapeutic with minerals (THERA-VIT-M) TABS tablet     ondansetron (ZOFRAN) 4 MG tablet     pantoprazole (PROTONIX) 40 MG EC tablet     predniSONE 2.5 MG PO tablet     tacrolimus (GENERIC EQUIVALENT) 0.5 MG capsule     tacrolimus (GENERIC EQUIVALENT) 1 MG capsule     voriconazole (VFEND) 200 MG tablet     voriconazole (VFEND) 50 MG tablet     No current facility-administered medications for this visit.          ATTESTATION    Kecia Blue is a 59 year old female who is having lung transplant follow-up via a billable video visit.     I have reviewed the note as documented above.  This accurately captures the substance of my conversation " "with the patient.    Visit-related time accounting:  *Pre-clinic chart and outside record review: 15 minutes  *Video visit time: 20 minutes  *Post-clinic charting, phone calls, messaging: 15 minutes  *Total time: 50 minutes      Complexity indicators:    --immune compromised, on high-risk medications x   --organ transplant recipient x   --multiple organ transplant recipient    --active respiratory infection    --within one year of transplant; and/or within one month of hospitalization    --chronic lung allograft dysfunction syndrome (CLAD, chronic rejection, or bronchiolitis obliterans syndrome)    --new medical problem addressed during this visit    --multiple active medical problems    --admitted directly to hospital from this clinic visit    -->50% of this visit was spent in counseling and care coordination. If yes, total visit time was         Srinivas Mcelroy MD  [facility]    The patient has been notified of following: \"This telephone/video visit will be conducted via a call between you and your physician/provider. We have found that certain health care needs can be provided without the need for a physical exam.  This service lets us provide the care you need with a telephone/video conversation.  If a prescription is necessary we can send it directly to your pharmacy.  If lab work is needed we can place an order for that and you can then stop by our lab to have the test done at a later time. If during the course of the visit the physician/provider feels a telephone/video visit is not appropriate, you will not be charged for this service.\"  "

## 2020-08-06 ENCOUNTER — VIRTUAL VISIT (OUTPATIENT)
Dept: TRANSPLANT | Facility: CLINIC | Age: 60
End: 2020-08-06
Attending: INTERNAL MEDICINE
Payer: COMMERCIAL

## 2020-08-06 ENCOUNTER — TELEPHONE (OUTPATIENT)
Dept: TRANSPLANT | Facility: CLINIC | Age: 60
End: 2020-08-06

## 2020-08-06 VITALS — WEIGHT: 130 LBS | SYSTOLIC BLOOD PRESSURE: 128 MMHG | DIASTOLIC BLOOD PRESSURE: 69 MMHG | BODY MASS INDEX: 22.31 KG/M2

## 2020-08-06 DIAGNOSIS — Z94.2 S/P LUNG TRANSPLANT (H): Primary | ICD-10-CM

## 2020-08-06 RX ORDER — ASCORBIC ACID, THIAMINE, RIBOFLAVIN, NIACINAMIDE, PYRIDOXINE, FOLIC ACID, COBALAMIN, BIOTIN, PANTOTHENIC ACID 100; 1.5; 1.7; 20; 10; 1; 6; 300; 1 MG/1; MG/1; MG/1; MG/1; MG/1; MG/1; UG/1; UG/1; MG/1
1 TABLET, COATED ORAL DAILY
Status: ON HOLD | COMMUNITY
Start: 2020-07-12 | End: 2021-03-04

## 2020-08-06 ASSESSMENT — PAIN SCALES - GENERAL: PAINLEVEL: NO PAIN (0)

## 2020-08-06 NOTE — LETTER
"    8/6/2020         RE: Kecia Blue  [address]  [place] [address]      Kecia Blue is a 59 year old female who is being evaluated via a billable video visit.          Video-Visit Details    Type of service:  Video Visit    Visit-related time accounting:  *Pre-clinic chart and outside record review: 15 minutes  *Video visit time: 20 minutes  *Post-clinic charting, phone calls, messaging: 15 minutes  *Total time: 50 minutes    Originating Location (pt. 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 This has been complicated by right main bronchial stenosis that was treated with dilation, most recently in 3/2019. She is currently on 2-drug immune suppression (tacrolimus and prednisone) because of hx of leukopenia, liver dysfunction, elevated alk phos and recurrent infections (CMV, aspergillus).     #  ID prophylaxis: Had been taking dapsone for PJP which was started during her 10/2019 hospitalization (G6PD level is normal), but recent CD4 count was >200 so dapsone was stopped in hopes of avoiding drug-related toxicities.     #  Ascites and portal hypertension, initially thought to be related to chronic right-sided heart pressures on basis of liver biopsy. Pre-lung transplant PA presssure was 98/45, mean 61 mm hg.  However, right heart cath on 3/10/20 showed only mild PAH, with PAP 33/18, mean 24.  Perhaps this is \"residual\" disease\" related to hx prior congestion with decompensation, at which time she develped ascites. Alk phos continues to be mildly elevated, likely due to posaconazole. " "She has f/u appt with Dr. Denise in Hepatology on 12/21/20.     #  History of left-sided Aspergillus empyema, which was first noted on 10/08/2019and was started on posaconazole.  After her 4/8/20 visit, I consulted Transplant ID-->recommended increasing vori dose to 300mg bid x 3 months, which has now been completed. CT scan was repeated on 7/17/20 and showed increased mass-like density, likely pleural-based, in left lower lung area-->will undergo needle aspiration, scheduled in IR for Thursday 8/13/20. Will order Fungitell and aspergillus galactomannin the same day.     #  History of CMV viremia.  Most recent blood CMV PCR on 6/30/20 was negative-->follow     #  History of EBV viremia, with most recent result 8,918 cpml (3/9/20)-->follow according to protocol     #  Reflux prophylaxis--decreased to 40mg every day at 7/15/20 visit.     #  Dialysis-dependent renal failure.  She reports good urine output but her dialysis team is dubious that her kidneys will improve. She recently spoke with one of our kidney txp coordinators-->they will talk in more detail after pleural biopsy results are known.     #  History of hypertension, being treated with amlodipine and  carvedilol. Blood pressure at dialysis center has been \"okay\" according to the patient.    Please also refer to RN Transplant Coordinator note for additional information related to this visit.    PATIENT PROFILE AND TRANSPLANT HISTORY:  Current age:                    59 year old  Underlying lung disease: IIP: Nonspecific Interstitial Pneumonia    Transplant date(s):        3/1/2018 (Lung)  Transplant POD(s):        888  Lung transplant type(s): Bilateral single lung      Transplant coordinator:   Mikayla Umanzor  Transplant provider(s):   Elsy Mccann, Ame Chow, Toby Hernandez, Dom Gunter    ALLERGIES/INTOLERANCES/SENSITIVITIES  Patient has no known allergies.    Active Patient Thresholds     Lab Low High " "Effective Since Comment    Tacrolimus Level 8 10 06/06/2019 6/6/19 CP          INTERVAL HISTORY:    --Transplant-related lab/procedure results  # Most recent resulted PRA: 2/19/20, neg for DSA  # Most recent bronchoscopy: 6/6/20, showed stenotic right mainstem bronchus, with significant airway obstruction  # Most recent transbronchial biopsy: >1 year  # Most recent blood CMV PCR: 7/28/20, neg  # Most recent blood EBV PCR: positive 3,918 cpm    --Most recent lung transplant clinic visit: 7/15/20 ([facility])    --Interval clinic visits, significant medical events (obtained by chart review and patient hx):    7/17/20: CT chest showed worsening LLL lesion thought to be loculated material in pleural space-->aspiration/biopsy scheduled with IR on 8/13/20 7/31/20: CD4 count 473-->dapsone stopped    --Today:    Ms. Blue was evaluated today via video visit; also present was lung transplant coordinator, Zeny Tony RN    The patient reports that her breathing has been unchanged since her most recent appointment with me 3 weeks ago.  She is not having shortness of breath with her daily activities.  She is not having productive cough, hemoptysis, wheezing, chest pain.    REVIEW OF SYSTEMS:  #Appetite is good and she is not having nausea or vomiting    #She is tolerating her dialysis runs well but they are \"of pain\"    #She is not having fever or Reiger's    #She is not having  symptoms.    #Complete review of systems was asked and was otherwise negative.      PHYSICAL EXAM: Patient sounded/appeared comfortable, coherent, and not short of breath.    I have reviewed and updated the patient's Past Medical History, Social History, Family History and Medication List.    STUDIES STILL PENDING AT THE TIME OF THIS NOTE:  Unresulted Labs Ordered in the Past 30 Days of this Admission     No orders found from 7/7/2020 to 8/7/2020.                   Medications:     Current Outpatient Medications   Medication     albuterol " "(PROAIR HFA/PROVENTIL HFA/VENTOLIN HFA) 108 (90 Base) MCG/ACT inhaler     amLODIPine (NORVASC) 5 MG tablet     calcium-vitamin D (CALTRATE) 600-400 MG-UNIT per tablet     carvedilol (COREG) 25 MG tablet     ferrous sulfate (FEROSUL) 325 (65 Fe) MG tablet     magnesium oxide (MAG-OX) 400 MG tablet     multivitamin, therapeutic with minerals (THERA-VIT-M) TABS tablet     ondansetron (ZOFRAN) 4 MG tablet     pantoprazole (PROTONIX) 40 MG EC tablet     predniSONE 2.5 MG PO tablet     tacrolimus (GENERIC EQUIVALENT) 0.5 MG capsule     tacrolimus (GENERIC EQUIVALENT) 1 MG capsule     voriconazole (VFEND) 200 MG tablet     voriconazole (VFEND) 50 MG tablet     No current facility-administered medications for this visit.          ATTESTATION    Kecia Blue is a 59 year old female who is having lung transplant follow-up via a billable video visit.     I have reviewed the note as documented above.  This accurately captures the substance of my conversation with the patient.    Visit-related time accounting:  *Pre-clinic chart and outside record review: 15 minutes  *Video visit time: 20 minutes  *Post-clinic charting, phone calls, messaging: 15 minutes  *Total time: 50 minutes      Complexity indicators:    --immune compromised, on high-risk medications x   --organ transplant recipient x   --multiple organ transplant recipient    --active respiratory infection    --within one year of transplant; and/or within one month of hospitalization    --chronic lung allograft dysfunction syndrome (CLAD, chronic rejection, or bronchiolitis obliterans syndrome)    --new medical problem addressed during this visit    --multiple active medical problems    --admitted directly to hospital from this clinic visit    -->50% of this visit was spent in counseling and care coordination. If yes, total visit time was         Srinivas Mcelroy MD  [facility]    The patient has been notified of following: \"This telephone/video visit will be conducted " "via a call between you and your physician/provider. We have found that certain health care needs can be provided without the need for a physical exam.  This service lets us provide the care you need with a telephone/video conversation.  If a prescription is necessary we can send it directly to your pharmacy.  If lab work is needed we can place an order for that and you can then stop by our lab to have the test done at a later time. If during the course of the visit the physician/provider feels a telephone/video visit is not appropriate, you will not be charged for this service.\"      Srinivas Mcelroy MD  "

## 2020-08-06 NOTE — PATIENT INSTRUCTIONS
PATIENT INSTRUCTIONS:  1. Continue to hydrate with 60-70 oz fluids daily.  2. Continue to exercise daily or most days of the week.  3. Lung biopsy scheduled for 8/13/20 with Interventional Radiology.  Please make arrangements to stay overnight in the [facility] as suggested by Juana from IR.  4. Continue discussing kidney transplant plan after lung bx results.  5. Get labs done in [place] waiting just before your biopsy.  [facility] will arrange and call you.      Next transplant clinic appointment:  One month virtual with Dr Mcelroy.  No tests.  Next lab draw:  TBD        ~~~~~~~~~~~~~~~~~~~~~~~~~    Thoracic Transplant Office phone [phone], fax [phone]    Office Hours 8:30 - 5:00     For after-hours urgent issues, please dial [phone], and ask to speak with the Thoracic Transplant Coordinator  On-Call, pager [phone].  --------------------  To expedite your medication refill(s), please contact your pharmacy and have them fax a refill request to: [phone]  .   *Please allow 3 business days for routine medication refills.  *Please allow 5 business days for controlled substance medication refills.    **For Diabetic medications and supplies refill(s), please contact your pharmacy and have them  Contact your Endocrine team.  --------------------  For scheduling appointments call [phone].  --------------------  Please Note: If you are active on fl3ur, all future test results will be sent by fl3ur message only, and will no longer be called to patient. You may also receive communication directly from your physician.

## 2020-08-06 NOTE — NURSING NOTE
Transplant Coordinator Note    Reason for visit: Post lung transplant follow up visit   Coordinator: Present on video visit  Caregiver:      Health concerns addressed today:  1. Breathing--stable  2. LLL biopsy 8/13/20 scheduled  3. Eating and drinking adequately.    4. Dialysis currently    Activity:  [place]  Oxygen needs: none    Pain management: n/a    Diabetic management:     Pt Education: medications (use/dose/side effects), how/when to call coordinator, frequency of labs, s/s of infection/rejection, call prior to starting any new medications, lab/vital sign book     Labs, CXR, PFTs reviewed with patient  Medication record reviewed and reconciled  Questions and concerns addressed    Health Maintenance: observing COVID precautions        PATIENT INSTRUCTIONS:  1. Continue to hydrate with 60-70 oz fluids daily.  2. Continue to exercise daily or most days of the week.  3. Lung biopsy scheduled for 8/13/20 with Interventional Radiology.  Please make arrangements to stay overnight in the [facility] as suggested by Juana from IR.  4. Continue discussing kidney transplant plan after lung bx results.  5. Get labs done at [facility] Waiting just before your biopsy.  [facility] will arrange and call you.    Next transplant clinic appointment:  One month virtual with Dr Mcelroy.  No tests.   Next lab draw:  TBD      AVS printed at time of check out

## 2020-08-10 ENCOUNTER — TELEPHONE (OUTPATIENT)
Dept: INTERVENTIONAL RADIOLOGY/VASCULAR | Facility: CLINIC | Age: 60
End: 2020-08-10

## 2020-08-10 ENCOUNTER — TELEPHONE (OUTPATIENT)
Dept: TRANSPLANT | Facility: CLINIC | Age: 60
End: 2020-08-10

## 2020-08-10 PROBLEM — Z99.2 HEMODIALYSIS PATIENT (H): Status: ACTIVE | Noted: 2020-08-10

## 2020-08-10 PROBLEM — R10.9 ABDOMINAL PAIN: Status: RESOLVED | Noted: 2019-10-06 | Resolved: 2020-08-10

## 2020-08-10 PROBLEM — J96.21 ACUTE ON CHRONIC RESPIRATORY FAILURE WITH HYPOXIA (H): Status: RESOLVED | Noted: 2018-02-21 | Resolved: 2020-08-10

## 2020-08-10 NOTE — TELEPHONE ENCOUNTER
Patient Call: Needs Referral for Covid testing has outside porcedure this Thursday needs Covid test done today 08/10/2020    Please call Kecia [phone]    Call back needed? Yes    Return Call Needed  Same as documented in contacts section  When to return call?: Same day: Route [facility]

## 2020-08-10 NOTE — TELEPHONE ENCOUNTER
Order for pre-procedure COVID 19 testing faxed to local clinic, [facility], per patient request. She spoke with staff there who think she can get the test done today. Date of procedure is 8/13/20.

## 2020-08-12 DIAGNOSIS — Z94.2 S/P LUNG TRANSPLANT (H): Primary | ICD-10-CM

## 2020-08-12 DIAGNOSIS — J86.9 EMPYEMA OF LUNG (H): ICD-10-CM

## 2020-08-13 ENCOUNTER — APPOINTMENT (OUTPATIENT)
Dept: INTERVENTIONAL RADIOLOGY/VASCULAR | Facility: CLINIC | Age: 60
End: 2020-08-13
Attending: CLINICAL NURSE SPECIALIST
Payer: COMMERCIAL

## 2020-08-13 ENCOUNTER — HOSPITAL ENCOUNTER (OUTPATIENT)
Facility: CLINIC | Age: 60
Discharge: HOME | End: 2020-08-13
Attending: INTERNAL MEDICINE | Admitting: RADIOLOGY
Payer: COMMERCIAL

## 2020-08-13 ENCOUNTER — APPOINTMENT (OUTPATIENT)
Dept: GENERAL RADIOLOGY | Facility: CLINIC | Age: 60
End: 2020-08-13
Attending: INTERNAL MEDICINE
Payer: COMMERCIAL

## 2020-08-13 ENCOUNTER — APPOINTMENT (OUTPATIENT)
Dept: MEDSURG UNIT | Facility: CLINIC | Age: 60
End: 2020-08-13
Attending: INTERNAL MEDICINE
Payer: COMMERCIAL

## 2020-08-13 VITALS
SYSTOLIC BLOOD PRESSURE: 110 MMHG | OXYGEN SATURATION: 98 % | BODY MASS INDEX: 24.55 KG/M2 | DIASTOLIC BLOOD PRESSURE: 56 MMHG | WEIGHT: 130 LBS | RESPIRATION RATE: 16 BRPM | HEART RATE: 88 BPM | HEIGHT: 61 IN | TEMPERATURE: 98.3 F

## 2020-08-13 DIAGNOSIS — R91.1 LESION OF LEFT LUNG: ICD-10-CM

## 2020-08-13 DIAGNOSIS — Z94.2 S/P LUNG TRANSPLANT (H): ICD-10-CM

## 2020-08-13 LAB
GRAM STN SPEC: NORMAL
GRAM STN SPEC: NORMAL
INR PPP: 1.06 (ref 0.86–1.14)
SPECIMEN SOURCE: NORMAL

## 2020-08-13 PROCEDURE — 87102 FUNGUS ISOLATION CULTURE: CPT | Performed by: INTERNAL MEDICINE

## 2020-08-13 PROCEDURE — 77012 CT SCAN FOR NEEDLE BIOPSY: CPT

## 2020-08-13 PROCEDURE — 87116 MYCOBACTERIA CULTURE: CPT | Performed by: INTERNAL MEDICINE

## 2020-08-13 PROCEDURE — 88184 FLOWCYTOMETRY/ TC 1 MARKER: CPT | Performed by: INTERNAL MEDICINE

## 2020-08-13 PROCEDURE — 40001004 ZZHCL STATISTIC FLOW INT 9-15 ABY TC 88188: Performed by: INTERNAL MEDICINE

## 2020-08-13 PROCEDURE — 87206 SMEAR FLUORESCENT/ACID STAI: CPT | Performed by: INTERNAL MEDICINE

## 2020-08-13 PROCEDURE — 88305 TISSUE EXAM BY PATHOLOGIST: CPT | Performed by: INTERNAL MEDICINE

## 2020-08-13 PROCEDURE — 40000986 XR CHEST 1 VW

## 2020-08-13 PROCEDURE — 99152 MOD SED SAME PHYS/QHP 5/>YRS: CPT

## 2020-08-13 PROCEDURE — 88185 FLOWCYTOMETRY/TC ADD-ON: CPT | Performed by: INTERNAL MEDICINE

## 2020-08-13 PROCEDURE — 87107 FUNGI IDENTIFICATION MOLD: CPT | Performed by: INTERNAL MEDICINE

## 2020-08-13 PROCEDURE — 87176 TISSUE HOMOGENIZATION CULTR: CPT | Performed by: INTERNAL MEDICINE

## 2020-08-13 PROCEDURE — 87186 SC STD MICRODIL/AGAR DIL: CPT | Performed by: INTERNAL MEDICINE

## 2020-08-13 PROCEDURE — 87070 CULTURE OTHR SPECIMN AEROBIC: CPT | Performed by: INTERNAL MEDICINE

## 2020-08-13 PROCEDURE — 25000128 H RX IP 250 OP 636: Performed by: RADIOLOGY

## 2020-08-13 PROCEDURE — 85610 PROTHROMBIN TIME: CPT | Performed by: RADIOLOGY

## 2020-08-13 PROCEDURE — 27210909 ZZH NEEDLE CR5

## 2020-08-13 PROCEDURE — 87015 SPECIMEN INFECT AGNT CONCNTJ: CPT | Performed by: INTERNAL MEDICINE

## 2020-08-13 PROCEDURE — 40000167 ZZH STATISTIC PP CARE STAGE 2

## 2020-08-13 PROCEDURE — 87205 SMEAR GRAM STAIN: CPT | Performed by: INTERNAL MEDICINE

## 2020-08-13 PROCEDURE — 88312 SPECIAL STAINS GROUP 1: CPT | Performed by: INTERNAL MEDICINE

## 2020-08-13 PROCEDURE — 87075 CULTR BACTERIA EXCEPT BLOOD: CPT | Performed by: INTERNAL MEDICINE

## 2020-08-13 RX ORDER — NALOXONE HYDROCHLORIDE 0.4 MG/ML
.1-.4 INJECTION, SOLUTION INTRAMUSCULAR; INTRAVENOUS; SUBCUTANEOUS
Status: DISCONTINUED | OUTPATIENT
Start: 2020-08-13 | End: 2020-08-13 | Stop reason: HOSPADM

## 2020-08-13 RX ORDER — DEXTROSE MONOHYDRATE 25 G/50ML
25-50 INJECTION, SOLUTION INTRAVENOUS
Status: DISCONTINUED | OUTPATIENT
Start: 2020-08-13 | End: 2020-08-13 | Stop reason: HOSPADM

## 2020-08-13 RX ORDER — FLUMAZENIL 0.1 MG/ML
0.2 INJECTION, SOLUTION INTRAVENOUS
Status: DISCONTINUED | OUTPATIENT
Start: 2020-08-13 | End: 2020-08-13 | Stop reason: HOSPADM

## 2020-08-13 RX ORDER — FENTANYL CITRATE 50 UG/ML
25-50 INJECTION, SOLUTION INTRAMUSCULAR; INTRAVENOUS EVERY 5 MIN PRN
Status: DISCONTINUED | OUTPATIENT
Start: 2020-08-13 | End: 2020-08-13 | Stop reason: HOSPADM

## 2020-08-13 RX ORDER — ONDANSETRON 2 MG/ML
4 INJECTION INTRAMUSCULAR; INTRAVENOUS EVERY 6 HOURS PRN
Status: DISCONTINUED | OUTPATIENT
Start: 2020-08-13 | End: 2020-08-13 | Stop reason: HOSPADM

## 2020-08-13 RX ORDER — NICOTINE POLACRILEX 4 MG
15-30 LOZENGE BUCCAL
Status: DISCONTINUED | OUTPATIENT
Start: 2020-08-13 | End: 2020-08-13 | Stop reason: HOSPADM

## 2020-08-13 RX ORDER — LIDOCAINE 40 MG/G
CREAM TOPICAL
Status: DISCONTINUED | OUTPATIENT
Start: 2020-08-13 | End: 2020-08-13 | Stop reason: HOSPADM

## 2020-08-13 RX ADMIN — MIDAZOLAM 1 MG: 1 INJECTION INTRAMUSCULAR; INTRAVENOUS at 11:45

## 2020-08-13 RX ADMIN — MIDAZOLAM 1 MG: 1 INJECTION INTRAMUSCULAR; INTRAVENOUS at 11:55

## 2020-08-13 RX ADMIN — FENTANYL CITRATE 50 MCG: 50 INJECTION, SOLUTION INTRAMUSCULAR; INTRAVENOUS at 11:55

## 2020-08-13 RX ADMIN — FENTANYL CITRATE 50 MCG: 50 INJECTION, SOLUTION INTRAMUSCULAR; INTRAVENOUS at 11:45

## 2020-08-13 ASSESSMENT — MIFFLIN-ST. JEOR: SCORE: 1112.06

## 2020-08-13 NOTE — PRE-PROCEDURE
GENERAL PRE-PROCEDURE:   Procedure:  Ct lung mediastinum biopsy  Date/Time:  8/13/2020 10:23 AM    Verbal consent obtained?: Yes    Written consent obtained?: Yes    Risks and benefits: Risks, benefits and alternatives were discussed    Consent given by:  Patient  Patient states understanding of procedure being performed: Yes    Patient's understanding of procedure matches consent: Yes    Procedure consent matches procedure scheduled: Yes    Expected level of sedation:  Moderate  Appropriately NPO:  Yes  ASA Class:  Class 1- healthy patient  Mallampati  :  Grade 1- soft palate, uvula, tonsillar pillars, and posterior pharyngeal wall visible  Lungs:  Lungs clear with good breath sounds bilaterally  Heart:  Normal heart sounds and rate  History & Physical reviewed:  History and physical reviewed and no updates needed  Statement of review:  I have reviewed the lab findings, diagnostic data, medications, and the plan for sedation

## 2020-08-13 NOTE — IP AVS SNAPSHOT
[address] [address]                                    After Visit Summary   8/13/2020    Kecia Blue    MRN: [id number]           After Visit Summary Signature Page    I have received my discharge instructions, and my questions have been answered. I have discussed any challenges I see with this plan with the nurse or doctor.    ..........................................................................................................................................  Patient/Patient Representative Signature      ..........................................................................................................................................  Patient Representative Print Name and Relationship to Patient    ..................................................               ................................................  Date                                   Time    ..........................................................................................................................................  Reviewed by Signature/Title    ...................................................              ..............................................  Date                                               Time          22EPIC Rev 08/18

## 2020-08-13 NOTE — DISCHARGE INSTRUCTIONS
[facility]    Interventional Radiology  Patient Instructions Following LUNG Biopsy    AFTER YOU GO HOME  ? If you were given sedation DO NOT drive or operate machinery at home or at work for at least 24 hours  ? DO relax and take it easy for 48 hours, no strenuous activity for 24 hours  ? DO drink plenty of fluids  ? DO resume your regular diet, unless otherwise instructed by your Primary Physician  ? Keep the dressing dry and in place for 24 hours.  ? DO NOT SMOKE FOR AT LEAST 24 HOURS, if you have been given any medications that were to help you relax or sedate you during your procedure  ? DO NOT drink alcoholic beverages the day of your procedure  ? DO NOT do any strenuous exercise or lifting (> 10 lbs) for at least 7 days following your procedure  ? DO NOT take a bath or shower for at least 12 hours following your procedure  ? Remove dressing after shower the next day. Replace with Band aid for 2 days.  Never leave a wet dressing in place.  ? DO NOT make any important or legal decisions for 24 hours following your procedure  ? There should be minimum drainage from the biopsy site    CALL THE PHYSICIAN IF:  ? You start bleeding from the procedure site.  If you do start to bleed from that site, lie down flat and hold pressure on the site for a minimum of 10 minutes.  Your physician will tell you if you need to return to the hospital  ? You develop nausea or vomiting  ? You have excessive swelling, redness, or tenderness at the site  ? You have drainage that looks like it is infected.  ? You experience severe pain  ? You develop hives or a rash or unexplained itching  ? You develop shortness of breath  ? You develop a temperature of 101 degrees F or greater  ? You develop bloody clots or red urine after you are discharged  ? You develop chest pain or cough up blood, lightheadedness or fainting    ADDITIONAL INSTRUCTIONS  If you are taking Coumadin, restart tonight.  Follow up with your Coumadin  Clinic or Primary Care MD to have your INR rechecked.    [facility] INTERVENTIONAL RADIOLOGY DEPARTMENT  Procedure Physician: Dr. Reyes                            Date of procedure: August 13, 2020  Telephone Numbers: [phone] Monday-Friday 8:00 am to 4:30 pm  [phone] After 4:30 pm Monday-Friday, Weekends & Holidays.   Ask for the Interventional Radiologist on call.  Someone is on call 24 hrs/day  [facility] toll free number: [phone] Monday-Friday 8:00 am to 4:30 pm  [facility] Emergency Dept: [phone]         no

## 2020-08-13 NOTE — IR NOTE
Interventional Radiology Intra-procedural Nursing Note    Patient Name: Kecia Blue  Medical Record Number: [id number]  Today's Date: August 13, 2020    Procedure: Left lung biopsy, possible chest tube  Proceduralist: Dr Sanchez    Sedation start time: 1145  Sedation end time: 1200  Sedation medications administered:   100 mcg of Fentanyl  2 mg of Versed  Total sedation time: 15 minutes     Procedure start time: 1144  Puncture time: 1146  Procedure end time: 1200    Report given to: LOUISE Mcdonnell 2A    Other Notes: Pt arrived to IR room CT2 from . Consent reviewed. Pt denies any questions or concerns regarding procedure. Pt positioned supine and monitored per protocol. Pt tolerated procedure without any noted complications. Pt transferred back to .    Shayy Jama

## 2020-08-13 NOTE — PROGRESS NOTES
Pt arrived back to 2A from lung biopsy at 1210. Alert/awake, VSS. Left posterior site CDI, no hematoma. Pt will be on bedrest for 1 hour, and then another hour with bathroom privilege.

## 2020-08-13 NOTE — PROGRESS NOTES
VSS. 2nd CXR completed/read by provider, no acute findings. Pt tolerated solids and fluids. Ambulated around the unit without issues post bedrest completion. Left upper back site remained CDI, no hematoma. Reviewed discharge information with patient. Pt verbalized understanding and did not have any further questions. Dc'd IV. Pt walked out of the unit with RN @ [id number]

## 2020-08-13 NOTE — PROCEDURES
[facility], [place]    Procedure: IR Procedure Note    Date/Time: 8/13/2020 12:05 PM  Performed by: Dipika Izaguirre MD  Authorized by: Dipika Izaguirre MD   IR Fellow Physician:  Other(s) attending procedure: HOLLI Sanchez MD.     UNIVERSAL PROTOCOL   Site Marked: Yes  Prior Images Obtained and Reviewed:  Yes  Required items: Required blood products, implants, devices and special equipment available    Patient identity confirmed:  Verbally with patient, arm band, provided demographic data and hospital-assigned identification number  Patient was reevaluated immediately before administering moderate or deep sedation or anesthesia  Confirmation Checklist:  Patient's identity using two indicators, relevant allergies, procedure was appropriate and matched the consent or emergent situation and correct equipment/implants were available  Time out: Immediately prior to the procedure a time out was called    Universal Protocol: the Joint Commission Universal Protocol was followed    Preparation: Patient was prepped and draped in usual sterile fashion           ANESTHESIA    Anesthesia: See MAR for details  Local Anesthetic:  Lidocaine 1% without epinephrine      SEDATION    Patient Sedated: Yes    Sedation Type:  Moderate (conscious) sedation  Sedation:  See MAR for details  Vital signs: Vital signs monitored during sedation    See dictated procedure note for full details.  Findings: Posterior left lung.     Specimens: core needle biopsy specimens sent for pathological analysis    Complications: None    Condition: Stable    Plan: -Observation unit  -Repeat upright CXR in 1 hr.     PROCEDURE   Patient Tolerance:  Patient tolerated the procedure well with no immediate complications  Describe Procedure: CT guided left lung mass biopsy.    See RN note for exact sedation time.   Length of time physician/provider present for 1:1 monitoring during sedation: 20

## 2020-08-13 NOTE — PROGRESS NOTES
Pt arrived to  for lung biopsy. VSS. Pt has chronic cough. Is a dialysis patient, has a dialysis catheter.  IV inserted. H&P up to date and consent needs to be signed.

## 2020-08-13 NOTE — IP AVS SNAPSHOT
MRN:[id number]                      After Visit Summary   8/13/2020    Kecia Blue    MRN: [id number]           Visit Information        Department      8/13/2020  8:31 AM Unit 2A [facility]          Review of your medicines      UNREVIEWED medicines. Ask your doctor about these medicines       Dose / Directions   albuterol 108 (90 Base) MCG/ACT inhaler  Commonly known as:  PROAIR HFA/PROVENTIL HFA/VENTOLIN HFA  Used for:  Lung replaced by transplant (H)      Dose:  2 puff  Inhale 2 puffs into the lungs 2 times daily  Quantity:  1 Inhaler  Refills:  1     amLODIPine 5 MG tablet  Commonly known as:  NORVASC      Dose:  10 mg  Take 2 tablets (10 mg) by mouth daily  Quantity:  60 tablet  Refills:  11     calcium carbonate 600 mg-vitamin D 400 units 600-400 MG-UNIT per tablet  Commonly known as:  CALTRATE  Used for:  S/P lung transplant (H), ILD (interstitial lung disease) (H), Lung transplant recipient (H), Encounter for long-term (current) use of high-risk medication, Anemia, unspecified type, Cytomegalovirus (CMV) viremia (H)      Dose:  1 tablet  Take 1 tablet by mouth daily  Refills:  0     carvedilol 25 MG tablet  Commonly known as:  COREG  Used for:  Essential hypertension      Dose:  25 mg  Take 1 tablet (25 mg) by mouth 2 times daily (with meals)  Quantity:  60 tablet  Refills:  11     Dialyvite Tabs      Dose:  1 tablet  Take 1 tablet by mouth daily  Refills:  0     ferrous sulfate 325 (65 Fe) MG tablet  Commonly known as:  FEROSUL  Used for:  S/P lung transplant (H)      Dose:  325 mg  Take 1 tablet (325 mg) by mouth daily (with breakfast)  Quantity:  60 tablet  Refills:  2     magnesium oxide 400 MG tablet  Commonly known as:  MAG-OX      Dose:  400 mg  Take 400 mg by mouth daily  Refills:  0     multivitamin w/minerals tablet  Used for:  Lung transplant recipient (H)      Dose:  1 tablet  Take 1 tablet by mouth daily  Quantity:  30 each  Refills:  11     ondansetron 4 MG  tablet  Commonly known as:  ZOFRAN  Used for:  Intractable vomiting with nausea, unspecified vomiting type      Dose:  4 mg  Take 1 tablet (4 mg) by mouth every 12 hours as needed for nausea  Quantity:  60 tablet  Refills:  0     pantoprazole 40 MG EC tablet  Commonly known as:  PROTONIX  Used for:  Gastroesophageal reflux disease without esophagitis, ILD (interstitial lung disease) (H), Lung transplant recipient (H)      Dose:  40 mg  Take 1 tablet (40 mg) by mouth daily  Quantity:  30 tablet  Refills:  11     predniSONE 2.5 MG tablet  Commonly known as:  DELTASONE  Used for:  Lung replaced by transplant (H)      Take two tablets (5mg) every AM and one tablet (2.5mg) every evening  Quantity:  90 tablet  Refills:  11     * tacrolimus 0.5 MG capsule  Commonly known as:  GENERIC EQUIVALENT  Used for:  S/P lung transplant (H)      Dose:  0.5 mg  Take 1 capsule (0.5 mg) by mouth every morning Total dose: 0.5 mg in the AM and 1 mg in the PM  Quantity:  30 capsule  Refills:  11     * tacrolimus 1 MG capsule  Commonly known as:  GENERIC EQUIVALENT  Used for:  S/P lung transplant (H)      Dose:  1 mg  Take 1 capsule (1 mg) by mouth every evening Total dose: 0.5 mg in the AM and 1 mg in the PM  Quantity:  30 capsule  Refills:  11     * voriconazole 200 MG tablet  Commonly known as:  VFEND  Used for:  Lung replaced by transplant (H)      Dose:  200 mg  Take 1 tablet (200 mg) by mouth 2 times daily Total dose: 300 mg twice daily  Quantity:  60 tablet  Refills:  2     * voriconazole 50 MG tablet  Commonly known as:  VFEND  Used for:  Lung replaced by transplant (H)      Dose:  100 mg  Take 2 tablets (100 mg) by mouth 2 times daily Total dose: 300 mg twice daily  Quantity:  120 tablet  Refills:  2         * This list has 4 medication(s) that are the same as other medications prescribed for you. Read the directions carefully, and ask your doctor or other care provider to review them with you.                  Protect others  around you: Learn how to safely use, store and throw away your medicines at www.disposemymeds.org.       Follow-ups after your visit       Your next 10 appointments already scheduled    Dec 21, 2020 11:00 AM CST  (Arrive by 10:45 AM)  Return Visit with Feng Denise MD  [facility] Hep[facility] ([facility]) [address] [place] [id number] [phone]         Care Instructions       Further instructions from your care team       [facility]    Interventional Radiology  Patient Instructions Following LUNG Biopsy    AFTER YOU GO HOME  ? If you were given sedation DO NOT drive or operate machinery at home or at work for at least 24 hours  ? DO relax and take it easy for 48 hours, no strenuous activity for 24 hours  ? DO drink plenty of fluids  ? DO resume your regular diet, unless otherwise instructed by your Primary Physician  ? Keep the dressing dry and in place for 24 hours.  ? DO NOT SMOKE FOR AT LEAST 24 HOURS, if you have been given any medications that were to help you relax or sedate you during your procedure  ? DO NOT drink alcoholic beverages the day of your procedure  ? DO NOT do any strenuous exercise or lifting (> 10 lbs) for at least 7 days following your procedure  ? DO NOT take a bath or shower for at least 12 hours following your procedure  ? Remove dressing after shower the next day. Replace with Band aid for 2 days.  Never leave a wet dressing in place.  ? DO NOT make any important or legal decisions for 24 hours following your procedure  ? There should be minimum drainage from the biopsy site    CALL THE PHYSICIAN IF:  ? You start bleeding from the procedure site.  If you do start to bleed from that site, lie down flat and hold pressure on the site for a minimum of 10 minutes.  Your physician will tell you if you need to return to the hospital  ? You develop nausea or vomiting  ? You have excessive swelling, redness, or  "tenderness at the site  ? You have drainage that looks like it is infected.  ? You experience severe pain  ? You develop hives or a rash or unexplained itching  ? You develop shortness of breath  ? You develop a temperature of 101 degrees F or greater  ? You develop bloody clots or red urine after you are discharged  ? You develop chest pain or cough up blood, lightheadedness or fainting    ADDITIONAL INSTRUCTIONS  If you are taking Coumadin, restart tonight.  Follow up with your Coumadin Clinic or Primary Care MD to have your INR rechecked.    [facility] INTERVENTIONAL RADIOLOGY DEPARTMENT  Procedure Physician: Dr. Reyes                            Date of procedure: August 13, 2020  Telephone Numbers: [phone] Monday-Friday 8:00 am to 4:30 pm  [phone] After 4:30 pm Monday-Friday, Weekends & Holidays.   Ask for the Interventional Radiologist on call.  Someone is on call 24 hrs/day  [facility] toll free number: [phone] Monday-Friday 8:00 am to 4:30 pm  [facility] Emergency Dept: [phone]          Additional Information About Your Visit       [employer] Information    [employer] gives you secure access to your electronic health record. If you see a primary care provider, you can also send messages to your care team and make appointments. If you have questions, please call your primary care clinic.  If you do not have a primary care provider, please call [phone] and they will assist you.       Care EveryWhere ID    This is your Care EveryWhere ID. This could be used by other organizations to access your [facility] medical records  [id number]       Your Vitals Were  Most recent update: 8/13/2020 12:58 PM    Blood Pressure   110/68 (BP Location: Left arm)          Pulse   90          Temperature   98.3  F (36.8  C) (Oral)          Respirations   16          Height   1.549 m (5' 1\")             Weight   59 kg (130 lb)    Last Period   06/07/2014 (Exact Date)    Pulse Oximetry   98%    BMI (Body Mass Index)   24.56 " kg/m           Primary Care Provider Office Phone # Fax #    Rosy JUAN Vu [phone]      Equal Access to Services    [place] VALENC[place] : Sahara aad ku hadryleyvadim Lucas, sudhamalini blancofuentesha, lakshmi kamakenzie vaz, morro yoliin hayaaeloina houghkelli bacon la'dionyeloina allie. So [facility] [phone].    ATENCIÓN: Si habla español, tiene a taylor disposición servicios gratuitos de asistencia lingüística. Llame al [phone].    We comply with applicable federal and state civil rights laws, including the [place] Human Rights Act. We do not discriminate on the basis of race, color, creed, [place]ar[place], national origin, marital status, age, disability, sex, sexual orientation, or gender identity.       Thank you!    Thank you for choosing [facility] for your care. Our goal is always to provide you with excellent care. Hearing back from our patients is one way we can continue to improve our services. Please take a few minutes to complete the written survey that you may receive in the mail after you visit with us. Thank you!            Medication List      ASK your doctor about these medications          Morning Afternoon Evening Bedtime As Needed    albuterol 108 (90 Base) MCG/ACT inhaler  Also known as:  PROAIR HFA/PROVENTIL HFA/VENTOLIN HFA  INSTRUCTIONS:  Inhale 2 puffs into the lungs 2 times daily  Doctor's comments:  Pharmacy may dispense brand covered by insurance (Proair, or proventil or ventolin or generic albuterol inhaler)                     amLODIPine 5 MG tablet  Also known as:  NORVASC  INSTRUCTIONS:  Take 2 tablets (10 mg) by mouth daily                     calcium carbonate 600 mg-vitamin D 400 units 600-400 MG-UNIT per tablet  Also known as:  CALTRATE  INSTRUCTIONS:  Take 1 tablet by mouth daily                     carvedilol 25 MG tablet  Also known as:  COREG  INSTRUCTIONS:  Take 1 tablet (25 mg) by mouth 2 times daily (with meals)                     Dialyvite Tabs  INSTRUCTIONS:  Take 1 tablet by mouth  daily                     ferrous sulfate 325 (65 Fe) MG tablet  Also known as:  FEROSUL  INSTRUCTIONS:  Take 1 tablet (325 mg) by mouth daily (with breakfast)                     magnesium oxide 400 MG tablet  Also known as:  MAG-OX  INSTRUCTIONS:  Take 400 mg by mouth daily                     multivitamin w/minerals tablet  INSTRUCTIONS:  Take 1 tablet by mouth daily                     ondansetron 4 MG tablet  Also known as:  ZOFRAN  INSTRUCTIONS:  Take 1 tablet (4 mg) by mouth every 12 hours as needed for nausea                     pantoprazole 40 MG EC tablet  Also known as:  PROTONIX  INSTRUCTIONS:  Take 1 tablet (40 mg) by mouth daily                     predniSONE 2.5 MG tablet  Also known as:  DELTASONE  INSTRUCTIONS:  Take two tablets (5mg) every AM and one tablet (2.5mg) every evening  Doctor's comments:  TXP DT 3/1/2018 (Lung) TXP Dischg DT 3/24/2018 DX Lung replaced by transplant Z94.2 [facility] ([place], [place])                     * tacrolimus 0.5 MG capsule  Also known as:  GENERIC EQUIVALENT  INSTRUCTIONS:  Take 1 capsule (0.5 mg) by mouth every morning Total dose: 0.5 mg in the AM and 1 mg in the PM                     * tacrolimus 1 MG capsule  Also known as:  GENERIC EQUIVALENT  INSTRUCTIONS:  Take 1 capsule (1 mg) by mouth every evening Total dose: 0.5 mg in the AM and 1 mg in the PM                     * voriconazole 200 MG tablet  Also known as:  VFEND  INSTRUCTIONS:  Take 1 tablet (200 mg) by mouth 2 times daily Total dose: 300 mg twice daily                     * voriconazole 50 MG tablet  Also known as:  VFEND  INSTRUCTIONS:  Take 2 tablets (100 mg) by mouth 2 times daily Total dose: 300 mg twice daily                        * This list has 4 medication(s) that are the same as other medications prescribed for you. Read the directions carefully, and ask your doctor or other care provider to review them with you.

## 2020-08-14 LAB — COPATH REPORT: NORMAL

## 2020-08-16 LAB
ACID FAST STN SPEC QL: NORMAL
ACID FAST STN SPEC QL: NORMAL
SPECIMEN SOURCE: NORMAL

## 2020-08-17 ENCOUNTER — TELEPHONE (OUTPATIENT)
Dept: TRANSPLANT | Facility: CLINIC | Age: 60
End: 2020-08-17

## 2020-08-17 DIAGNOSIS — Z94.2 LUNG REPLACED BY TRANSPLANT (H): ICD-10-CM

## 2020-08-17 LAB — COPATH REPORT: NORMAL

## 2020-08-17 PROCEDURE — 80197 ASSAY OF TACROLIMUS: CPT | Performed by: PHYSICIAN ASSISTANT

## 2020-08-17 NOTE — TELEPHONE ENCOUNTER
DATE:  8/17/2020   TIME OF RECEIPT FROM LAB:  10:34 AM  LAB TEST:  ALK PHOS  LAB VALUE:  628   RESULTS GIVEN WITH READ-BACK TO (PROVIDER):  CAITIE Latham RN  TIME LAB VALUE REPORTED TO PROVIDER:   9:42 AM

## 2020-08-18 DIAGNOSIS — I10 HYPERTENSION: Primary | ICD-10-CM

## 2020-08-18 LAB
BACTERIA SPEC CULT: NO GROWTH
SPECIMEN SOURCE: NORMAL
TACROLIMUS BLD-MCNC: 9.4 UG/L (ref 5–15)
TME LAST DOSE: NORMAL H

## 2020-08-18 RX ORDER — AMLODIPINE BESYLATE 5 MG/1
10 TABLET ORAL DAILY
Qty: 60 TABLET | Refills: 11 | Status: ON HOLD | OUTPATIENT
Start: 2020-08-18 | End: 2021-02-25

## 2020-08-18 NOTE — RESULT ENCOUNTER NOTE
Armand Mcdonnell, your tacrolimus level was 9.4 at 12 hours on 8/17/20 which is within your goal range of 8-10. No dose change at this time. Please call the transplant office ([phone]) with any questions. Thanks, Mikayla

## 2020-08-20 LAB
BACTERIA SPEC CULT: NORMAL
Lab: NORMAL
SPECIMEN SOURCE: NORMAL

## 2020-08-21 ENCOUNTER — TELEPHONE (OUTPATIENT)
Dept: INFECTIOUS DISEASES | Facility: CLINIC | Age: 60
End: 2020-08-21

## 2020-08-21 ENCOUNTER — TELEPHONE (OUTPATIENT)
Dept: TRANSPLANT | Facility: CLINIC | Age: 60
End: 2020-08-21

## 2020-08-21 NOTE — TELEPHONE ENCOUNTER
Patient had IR needle pleural biopsy last week and it shows fungal elements in the tissue. Per Dr. Mcelroy, continue vori. Arrange for f/u with infectious disease and in-person visit with Ame. Discussed with patient, will plan for these visits mid-Sept. Pt wondering if she is okay to call kidney transplant coordinator to start process about eval, will ask Dr. Mcelroy about this.

## 2020-08-21 NOTE — TELEPHONE ENCOUNTER
[facility] Call Center    Phone Message    May a detailed message be left on voicemail: yes     Reason for Call: Appointment Intake    Referring Provider Name: Srinivas Mcelroy MD in  [facility]      Diagnosis and/or Symptoms: S/P lung transplant (H) [Z94.2]  [place]yema of lung (H) [J86.9    Pt last seen by Dr Aguilera 12/18/2019.         Action Taken: Message routed to:  [facility] id    Travel Screening: Not Applicable

## 2020-08-31 ENCOUNTER — TELEPHONE (OUTPATIENT)
Dept: TRANSPLANT | Facility: CLINIC | Age: 60
End: 2020-08-31

## 2020-08-31 DIAGNOSIS — Z94.2 LUNG REPLACED BY TRANSPLANT (H): ICD-10-CM

## 2020-08-31 PROCEDURE — 80197 ASSAY OF TACROLIMUS: CPT | Performed by: PHYSICIAN ASSISTANT

## 2020-08-31 RX ORDER — VORICONAZOLE 200 MG/1
TABLET, FILM COATED ORAL
Qty: 60 TABLET | Refills: 4 | Status: SHIPPED | OUTPATIENT
Start: 2020-08-31 | End: 2020-10-01

## 2020-08-31 RX ORDER — VORICONAZOLE 50 MG/1
100 TABLET, FILM COATED ORAL 2 TIMES DAILY
Qty: 120 TABLET | Refills: 4 | Status: SHIPPED | OUTPATIENT
Start: 2020-08-31 | End: 2020-10-01

## 2020-08-31 NOTE — ANESTHESIA CARE TRANSFER NOTE
Patient: Kecia Blue    Procedure(s):  Flexible And Rigid Bronchoscopy, Balloon Dilation.    Diagnosis: Lung Transplant Recipient  Diagnosis Additional Information: No value filed.    Anesthesia Type:   No value filed.     Note:  Airway :Face Mask  Patient transferred to:PACU  Comments: To PACU, VSS, pt awake and alert, exchanging well, report to RN, care accepted.Handoff Report: Identifed the Patient, Identified the Reponsible Provider, Reviewed the pertinent medical history, Discussed the surgical course, Reviewed Intra-OP anesthesia mangement and issues during anesthesia, Set expectations for post-procedure period and Allowed opportunity for questions and acknowledgement of understanding      Vitals: (Last set prior to Anesthesia Care Transfer)    CRNA VITALS  1/17/2019 1147 - 1/17/2019 1222      1/17/2019             Pulse:  89    Ht Rate:  87    SpO2:  100 %    Resp Rate (observed):  1  (Abnormal)                 Electronically Signed By: ADRIÁN Jones CRNA  January 17, 2019  12:22 PM   100

## 2020-08-31 NOTE — TELEPHONE ENCOUNTER
Patient is able to see Ame sooner, slot held for 9/9. ID appointment on 9/8. Per Dr. Mcelroy, wait until patient has seen ID before starting kidney transplant work up. Pt reports starting next week, she is only going to be on dialysis twice weekly instead of 3x weekly. New dialysis days will be M/Th.   Noted continued elevated alk phos. Routed to GI doctor as FYI as this level is higher than previous.

## 2020-08-31 NOTE — TELEPHONE ENCOUNTER
DATE:  8/31/2020   TIME OF RECEIPT FROM LAB:  1006  LAB TEST:  Alk Phos  LAB VALUE:  711  RESULTS GIVEN WITH READ-BACK TO (PROVIDER):  Mikayla Latham  TIME LAB VALUE REPORTED TO PROVIDER:   1003

## 2020-09-01 DIAGNOSIS — Z94.2 LUNG REPLACED BY TRANSPLANT (H): Primary | ICD-10-CM

## 2020-09-02 ENCOUNTER — TELEPHONE (OUTPATIENT)
Dept: TRANSPLANT | Facility: CLINIC | Age: 60
End: 2020-09-02

## 2020-09-02 DIAGNOSIS — Z94.2 S/P LUNG TRANSPLANT (H): ICD-10-CM

## 2020-09-02 LAB
TACROLIMUS BLD-MCNC: 10.7 UG/L (ref 5–15)
TME LAST DOSE: NORMAL H

## 2020-09-02 RX ORDER — TACROLIMUS 1 MG/1
CAPSULE ORAL
Qty: 30 CAPSULE | Refills: 11 | COMMUNITY
Start: 2020-09-02 | End: 2020-09-09

## 2020-09-02 RX ORDER — TACROLIMUS 0.5 MG/1
0.5 CAPSULE ORAL 2 TIMES DAILY
Qty: 60 CAPSULE | Refills: 11 | COMMUNITY
Start: 2020-09-02 | End: 2020-09-09

## 2020-09-02 NOTE — TELEPHONE ENCOUNTER
Spoke with the patient and confirmed post lung appointments on 9/9/20.  Informed patient an itinerary can be accessed on [employer].

## 2020-09-02 NOTE — TELEPHONE ENCOUNTER
Tacrolimus level 10.7 at 11.5 hours, on 9/2/20  Goal 8-10.   Current dose 0.5 mg in AM, 1 mg in PM    Dose changed to  0.5 mg in AM, 0.5 mg in PM   Recheck level in 7 days     Discussed with Kecia and patient agreed with plan

## 2020-09-03 ENCOUNTER — TELEPHONE (OUTPATIENT)
Dept: TRANSPLANT | Facility: CLINIC | Age: 60
End: 2020-09-03

## 2020-09-03 DIAGNOSIS — Z94.2 S/P LUNG TRANSPLANT (H): Primary | ICD-10-CM

## 2020-09-03 NOTE — TELEPHONE ENCOUNTER
Alk phos increased to 711 from 628 2 weeks prior while on voriconazole.  Spoke with patient and set plan to repeat labs again in one week when patient is here for clinic visit.

## 2020-09-08 ENCOUNTER — VIRTUAL VISIT (OUTPATIENT)
Dept: INFECTIOUS DISEASES | Facility: CLINIC | Age: 60
End: 2020-09-08
Attending: INTERNAL MEDICINE
Payer: COMMERCIAL

## 2020-09-08 DIAGNOSIS — J86.9 EMPYEMA OF LUNG (H): ICD-10-CM

## 2020-09-08 DIAGNOSIS — Z94.2 S/P LUNG TRANSPLANT (H): ICD-10-CM

## 2020-09-08 ASSESSMENT — PAIN SCALES - GENERAL: PAINLEVEL: NO PAIN (0)

## 2020-09-08 NOTE — LETTER
"9/8/2020       RE: Kecia Blue  [address] [place] [address]     Dear Colleague,    Thank you for referring your patient, Kecia Blue, to the  [facility] at [facility]. Please see a copy of my visit note below.    Keica Blue is a 59 year old female who is being evaluated via a billable video visit.      The patient has been notified of following:     \"This video visit will be conducted via a call between you and your physician/provider. We have found that certain health care needs can be provided without the need for an in-person physical exam.  This service lets us provide the care you need with a video conversation.  If a prescription is necessary we can send it directly to your pharmacy.  If lab work is needed we can place an order for that and you can then stop by our lab to have the test done at a later time.    Video visits are billed at different rates depending on your insurance coverage.  Please reach out to your insurance provider with any questions.    If during the course of the call the physician/provider feels a video visit is not appropriate, you will not be charged for this service.\"    Patient has given verbal consent for Video visit? Yes  How would you like to obtain your AVS? MyChart  If you are dropped from the video visit, the video invite should be resent to: Text to cell phone: [phone]  Will anyone else be joining your video visit? No        Video-Visit Details    Type of service:  Video Visit    Video Time: 30 minutes    Originating Location (pt. Location): Home     Distant Location (provider location):  [facility]     Platform used for Video Visit: Doximity  _________________________________________________________________________    Recommendations:  --Recommend switching to PO Posaconazole 300 mg BID x2 doses followed by posaconazole " 300 mg once daily  --Will need posaconazole trough level on day 7  --Patient will likely need surgical intervention for resection--will discuss this with lung transplant team.  --started on bactrim HD dosing x 10 days for stenotrophomonas on sputum cx      Follow up with ID in 6 weeks    Vinicio Layton MD  Transplant Infectious Disease  Pager [phone]    Problem List/Assessement:  1. Aspergillus fumigatus lung infection   2. Prior left Aspergillus empyema  3. S/p lung transplant 3/1/2018 (CMV D+/R+, EBV D+/R+).    -Post transplant course has been complicated by right mainstem bronchial stenosis treated with serial dilations-most recently March 2019,     4. ESRD on iHD  5. Liver dysfunction-undetermined etiology  6. Prior hx of CMV viremia  7. Low level EBV viremia    Overall Aspergillus remains susceptible to voriconazole based on recent susceptibility testing. However, there is progression on CT chest with lung mass like consolidation on repeat imaging while on voriconazole. As a result, patient will likely need surgical intervention w/ resection for cure. She will likely need to be on long term antifungals and therefore would prefer to switch to Posaconazole at this time. Will discuss this plan with transplant team regarding risks vs benefits.     She is currently being worked up for renal transplant. Would prefer Aspergillus infection be managed surgically prior to renal transplantation.    Repeat serum Aspergillus Ag negative. fungitell at 106.  ________________________________________________________________________    HPI:    Patient is a 58 yo female with PMHx significant for ILD, seronegative rheumatoid arthritis and dermatomyositis s/p B/L lung transplantation 3/1/2018 (CMV D+/R+, EBV D+/R+). Post transplant course has been complicated by    -right mainstem bronchial stenosis treated with serial dilations-most recently March 2019,    -Left sided aspergillus empyema 10/08/2019 and    -CMV viremia.   Her  immunosuppression includes tacrolimus and prednisone. Her PMHx is also significant for liver dysfunction of unclear etiology with ascites and portal HTN, as well as ESRD now on iHD and being considered for renal transplant  She presents to the ID clinic for further evaluation and management.    Briefly to review relevant medical history,    --Hospitalized 10/6/19-10/12/19--admitted with 3 month hx of left sided upper abd/chest pain. 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 Underwent CT guided biopsy on 10/8/20 which yielded purulent fluid--cx positive for aspergillus fumigatus. She underwent bronchoscopy with BAL on 10/11/20 which showed septate hyphae.  Notably, she had also grown Actinomyces from multiple BAL specimens in the past. She was started on voriconazole at the time.     --Subsequently seen in ID clinic on 12/18/2019. At the time, patient was continued on voriconazole with plans to repeat imaging.    Patient subsequently underwent repaet CT chest on 7/18/20 at OSH and was noted to have interval increased hypodense mass like region in medial aspect of left lower lung. As a result, patient underwent CT guided lung tissue biopsy. Histopath showed acute inflammation, necrosis and myofibroblastic proliferation with focal fungal hyphae highlighted by GMS.  Cultures positive for Aspergillus fumigatus.    ROS: 10 point ROS unremarkable unless stated otherwise in HPI    Exam:   General: Well appearing, up in chair, in no acute distress  Chest: Normal work of breathing, on room air  Abd: Non distended  Skin: limited exam with no rashes/sores  Neuro: alert and oriented x3 grossly moving all extremities.    Microbiology:  9/9/20 Serum fungitell 106  9/9/20 Serum Aspergillus EIA 0.06  9/9/20 Serum CMV PCR negative  9/9/20 Serum EBV PCR 2,935  9/9/20 Resp Viral panel negative  9/9/20 Sputum Cx: stenotrophomonas maltophilia x2    8/13/20 Lung tissue AFB Cx: NGTD  8/13/20 Lung tissue Bacterial  Cx: NGTD  20 Lung tissue Fungal Cx: Aspergillus fumigatus     Resp viral panel: negative  2019 Resp viral panel: negative  10/25/19 TB quant gold negative  10/11/19 LLL BAL:   Fungal Cx: Aspergillus fumigatus   AFB Cx: NG   Bacterial Cx: NG   Nocardia Cx: NG   Actinomyces rule out: Actinomyces odontolyticus   Aspergillus A.57  10/8/19 Pleural fluid Cx: Aspergillus fumigatus  10/7/19 Serum fungitell: 269  10/7/19 Serum Aspergillus Ag 1.13  19 Serum fungitell 122  19 Serum Aspergillus Ag 1.08  19 Sputum Cx: Stenotrophomonas maltophilia    Imagin/18/20 CT chest:  1. Interval increased hypodense masslike region in the medial left lower lung, appearing   contiguous with a trace adjacent left pleural effusion. This may represent source of focal   infection.   2. Trace right pleural effusion.   3. Interval increased compression of T5 without retropulsion and no acute fracture cleft. Bone   density measurement of 87 HU. This value indicates a high likelihood of osteoporosis, and if   clinically indicated, additional testing might be useful to confirm this diagnosis and/or   monitor for treatment related changes.

## 2020-09-08 NOTE — PROGRESS NOTES
"[facility]  September 9, 2020         Assessment and Plan:   Kecia Blue is a 59 year old female with history of bilateral lung transplant on 3/1/18 for ILD associated with connective tissue disease for who is seen today for routine follow up.     1. S/p bilateral lung transplant: notes acute on chronic productive cough, mainly in the am, wheeze that improves with coughing and a slight increase in her dyspnea with activity. Notes she is not very active secondary to her knees. Sating 9%% on room air. DSA 2/19/20 negative. CXR reviewed by me today demonstrates perihilar and right basilar atelectasis, appears stable. PFTs didn't meet ATS criteria, but have improved from prior. Review of prior cultures note + steno 9/19, sensitive to Bactrim and Ceftaz.  - Sputum sample including fungal today  - Viral panel  - Start Bactrim with dialysis based dosing X 10 days  - DSA at next f/u  - Continue two drug IS including tacrolimus (goal 80-10) and prednisone (for recurrent infections)   - PCP prophylaxis discontinued for CD4 count > 200 (7/28)    2. Right mainstem bronchial stenosis: s/p dilation, most recently 3/19.   - Monitor    3. Ascites, elevated alk phos and portal hypertension: initially thought to be related to chronic right-sided heart pressures on  liver biopsy with pre-lung transplant PA presssure of 98/45, mean 61 mm hg. However, right heart cath on 3/10/20 showed only mild PAH, with PAP 33/18, mean 24. Possibly related to \"residual\" disease\" from prior congestion with decompensation, at which time she develped ascites. Alk phos elevation likely secondary to her azole use, down slightly today to 652 with normal enzymes.   - Monitor LFTs  - Following with Hepatology, next 12/21     4. H/o left-sided aspergillus empyema: first noted on 10/08/2019. CT scan repeated on 7/17/20 showed increased mass-like density, likely pleural-based, in left lower lung " area s/p needle aspiration on 8/13/20, now + for aspergillus fumigatus. Voriconazole level of 2.1 on 8/31. Seen ID yesterday with plan to follow.  - BD glucan and galactomannan pending  - Continue voriconazole 300 mg BID for now  - LFTs monitoring per above     5. H/o CMV viremia: last CMV on 8/31 negative.   - Pending for today     6. H/o EBV viremia: last level of 8918 on 3/9/20.   - EBV added on to today    7. Dialysis-dependent renal failure: she reports good urine output, but her dialysis team is dubious that her kidneys will improve. Recently changed from 3 times/week HD to to 2 times/week HD on M/Th. Will determine plan for renal transplant work up once we have a plan for the aspergillus in place.      8. HTN: controlled.  - Continue amlodipine and carvedilol    RTC: one month  Influenza and other vaccinations: influenza at next visit  Annual dermatology visit:    Ame Chow PA-C  Pulmonary, Allergy, Critical Care and Sleep Medicine        Interval History:     Overall, feels fine, but does have a cough again, feels it's productive mainly in the am, worse in the last 3 weeks. Occasional coughing at night as well. No fever or chills, no night sweats. Denies congestion, occasional wheezing that resolves with coughing. Does note some increase in shortness of breath with activity, not active secondary to her knees. Does have some more fatigue. No chest pain, no palpitations. No acid reflux or bloating, no gas. Has a BM daily, still producing urine.           Review of Systems:   Please see HPI, otherwise the complete 10 point ROS is negative.           Past Medical and Surgical History:     Past Medical History:   Diagnosis Date     Abdominal pain 10/6/2019     Acute on chronic respiratory failure with hypoxia (H) 2/21/2018     Antisynthetase syndrome (H) 2014     Chronic cough      Chronic infection     recent C diff  8/18     Dehydration 8/1/2018     Dehydration 8/1/2018     Dermatomyositis (H)      Dysplasia  of cervix, low grade (ESTRADA 1)      ILD (interstitial lung disease) (H)      Nausea and vomiting 12/4/2018     Osteopenia      PONV (postoperative nausea and vomiting)      Pulmonary hypertension (H)      Raynaud's disease      Seronegative rheumatoid arthritis (H)      Past Surgical History:   Procedure Laterality Date     BRONCHOSCOPY (RIGID OR FLEXIBLE), DIAGNOSTIC N/A 4/10/2018    Procedure: COMBINED BRONCHOSCOPY (RIGID OR FLEXIBLE), LAVAGE;;  Surgeon: Mariposa Donohue MD;  Location: [facility]     BRONCHOSCOPY (RIGID OR FLEXIBLE), DILATE BRONCHUS / TRACHEA N/A 10/11/2018    Procedure: BRONCHOSCOPY (RIGID OR FLEXIBLE), DILATE BRONCHUS / TRACHEA;  Flexible And Rigid Bronchoscopy and Dilation;  Surgeon: Wilber Lin MD;  [facility]     BRONCHOSCOPY FLEXIBLE N/A 3/13/2018    Procedure: BRONCHOSCOPY FLEXIBLE;  Flexible Bronchoscopy ;  Surgeon: Gissell Sanchez MD;  Location: [facility]     BRONCHOSCOPY FLEXIBLE N/A 5/9/2018    Procedure: BRONCHOSCOPY FLEXIBLE;;  Surgeon: Wilber Lin MD;  [facility]: [facility]     BRONCHOSCOPY FLEXIBLE AND RIGID N/A 9/10/2018    Procedure: BRONCHOSCOPY FLEXIBLE AND RIGID;  Flexible and Rigid Bronchoscopy with Balloon Dilation, tissue debulking with cryo, and Right mainstem bronchus stent placement;  Surgeon: Wilber Lin MD;  [facility]     BRONCHOSCOPY RIGID N/A 6/6/2018    Procedure: BRONCHOSCOPY RIGID;;  Surgeon: Lopez Macias MD;  [facility]: [facility]     BRONCHOSCOPY, DILATE BRONCHUS, STENT BRONCHUS, COMBINED N/A 6/11/2018    Procedure: COMBINED BRONCHOSCOPY, DILATE BRONCHUS, STENT BRONCHUS;  Flexible Bronchoscopy, Balloon Dilation, Bronchial Washings;  Surgeon: Wilber Lin MD;  [facility]     BRONCHOSCOPY, DILATE BRONCHUS, STENT BRONCHUS, COMBINED Right 7/10/2018    Procedure: COMBINED BRONCHOSCOPY, DILATE BRONCHUS, STENT BRONCHUS;  Flexible Bronchoscopy, Balloon Dilation, Bronchial Washings  ;  Surgeon: Wilber Lin,  MD;  [facility]: [facility]     BRONCHOSCOPY, DILATE BRONCHUS, STENT BRONCHUS, COMBINED N/A 8/2/2018    Procedure: COMBINED BRONCHOSCOPY, DILATE BRONCHUS, STENT BRONCHUS;  Flexible Bronchoscopy, Bronchial Washings, Balloon Dilation;  Surgeon: Wilber Lin MD;  [facility]     BRONCHOSCOPY, DILATE BRONCHUS, STENT BRONCHUS, COMBINED N/A 8/20/2018    Procedure: COMBINED BRONCHOSCOPY, DILATE BRONCHUS, STENT BRONCHUS;  Flexible Bronchoscopy, Balloon Dilation;  Surgeon: Wilber Lin MD;  [facility]     BRONCHOSCOPY, DILATE BRONCHUS, STENT BRONCHUS, COMBINED N/A 10/29/2018    Procedure: Flexible Bronchoscopy, Balloon Dilation, Stent Revision, Airway Examination And Therapeutic Suctioning, Cyro Tumor Debulking;  Surgeon: Wilber Lin MD;  [facility]     BRONCHOSCOPY, DILATE BRONCHUS, STENT BRONCHUS, COMBINED N/A 11/20/2018    Procedure: Rigid Bronchoscopy, Stent Removal and dilitation;  Surgeon: Wilber Lin MD;  [facility]     BRONCHOSCOPY, DILATE BRONCHUS, STENT BRONCHUS, COMBINED N/A 12/14/2018    Procedure: Flexible And Rigid Bronchoscopy, Balloon Dilation, Bronchial Washing;  Surgeon: Wilber Lin MD;  [facility]     BRONCHOSCOPY, DILATE BRONCHUS, STENT BRONCHUS, COMBINED N/A 1/17/2019    Procedure: Flexible And Rigid Bronchoscopy, Balloon Dilation.;  Surgeon: Wilber Lin MD;  [facility]     BRONCHOSCOPY, DILATE BRONCHUS, STENT BRONCHUS, COMBINED N/A 3/7/2019    Procedure: Flexible and Rigid Bronchoscopy, Bronchial Washing, Balloon Dilation;  Surgeon: Wilber Lin MD;  [facility]     BRONCHOSCOPY, DILATE BRONCHUS, STENT BRONCHUS, COMBINED N/A 6/6/2019    Procedure: Rigid and Flexible Bronchoscopy, Balloon Dilation;  Surgeon: Wilber Lin MD;  [facility]     BRONCHOSCOPY, DILATE BRONCHUS, STENT BRONCHUS, COMBINED N/A 10/11/2019    Procedure: Flexible and Rigid Bronchoscopy, Balloon Dilation, Bronchoalveolar  Antonette;  Surgeon: Wilber Lin MD;  [facility]     CV RIGHT HEART CATH N/A 3/10/2020    Procedure: CV RIGHT HEART CATH;  Surgeon: Wai Garcia MD;  [facility]     ENT SURGERY      tonsillectomy as a child     ESOPHAGOSCOPY, GASTROSCOPY, DUODENOSCOPY (EGD), COMBINED N/A 10/29/2018    Procedure: COMBINED ESOPHAGOSCOPY, GASTROSCOPY, DUODENOSCOPY (EGD) with biopsies and polypectomy;  Surgeon: Chente Bloom MD;  [facility]     INSERT EXTRACORPORAL MEMBRANE OXYGENATOR ADULT (OUTSIDE OR) N/A 2/27/2018    Procedure: INSERT EXTRACORPORAL MEMBRANE OXYGENATOR ADULT (OUTSIDE OR);  INSERT EXTRACORPORAL MEMBRANE OXYGENATOR ADULT (OUTSIDE OR) ;  Surgeon: Toby Hernandez MD;  [facility]     IR CVC TUNNEL PLACEMENT > 5 YRS OF AGE  10/25/2019     IR PARACENTESIS  1/8/2020     IR THORACENTESIS  9/13/2019     IR TRANSCATHETER BIOPSY  1/8/2020     no prior surgery       REMOVE EXTRACORPORAL MEMBRANE OXYGENATOR ADULT N/A 3/3/2018    Procedure: REMOVE EXTRACORPORAL MEMBRANE OXYGENATOR ADULT;  Removal of Right Femoral Venous and Right Axillary Arterial Extracorporeal Membrane Oxygenator;  Surgeon: Toby Hernandez MD;  [facility]     TRANSPLANT LUNG RECIPIENT SINGLE X2 Bilateral 3/1/2018    Procedure: TRANSPLANT LUNG RECIPIENT SINGLE X2;  Median Sternotomy, Extracorporeal Membrane Oxygenator, bilateral sequential lung transplant;  Surgeon: Toby Hernandez MD;  Location:  OR           Family History:     Family History   Problem Relation Age of Onset     Hypertension Mother      Arthritis Mother      Pancreatic Cancer Father      Diabetes Father             Social History:     Social History     Socioeconomic History     Marital status:      Spouse name: Not on file     Number of children: Not on file     Years of education: Not on file     Highest education level: Not on file   Occupational History     Not on file   Social  Needs     Financial resource strain: Not on file     Food insecurity     Worry: Not on file     Inability: Not on file     Transportation needs     Medical: Not on file     Non-medical: Not on file   Tobacco Use     Smoking status: Never Smoker     Smokeless tobacco: Never Used   Substance and Sexual Activity     Alcohol use: No     Alcohol/week: 1.0 standard drinks     Types: 1 Glasses of wine per week     Drug use: No     Sexual activity: Not on file   Lifestyle     Physical activity     Days per week: Not on file     Minutes per session: Not on file     Stress: Not on file   Relationships     Social connections     Talks on phone: Not on file     Gets together: Not on file     Attends Congregational service: Not on file     Active member of club or organization: Not on file     Attends meetings of clubs or organizations: Not on file     Relationship status: Not on file     Intimate partner violence     Fear of current or ex partner: Not on file     Emotionally abused: Not on file     Physically abused: Not on file     Forced sexual activity: Not on file   Other Topics Concern     Parent/sibling w/ CABG, MI or angioplasty before 65F 55M? No   Social History Narrative    3/6/2019 - Lives with . Has three daughters. Four grandchildren (two ). No pets. Travelled previously to [facility]. Has visited [place] several times.             Medications:     Current Outpatient Medications   Medication     albuterol (PROAIR HFA/PROVENTIL HFA/VENTOLIN HFA) 108 (90 Base) MCG/ACT inhaler     sulfamethoxazole-trimethoprim (BACTRIM) 400-80 MG tablet     amLODIPine (NORVASC) 5 MG tablet     B Complex-C-Folic Acid (DIALYVITE) TABS     calcium-vitamin D (CALTRATE) 600-400 MG-UNIT per tablet     carvedilol (COREG) 25 MG tablet     ferrous sulfate (FEROSUL) 325 (65 Fe) MG tablet     magnesium oxide (MAG-OX) 400 MG tablet     multivitamin, therapeutic with minerals (THERA-VIT-M) TABS tablet     pantoprazole (PROTONIX) 40  MG EC tablet     predniSONE 2.5 MG PO tablet     tacrolimus (GENERIC EQUIVALENT) 0.5 MG capsule     tacrolimus (GENERIC EQUIVALENT) 1 MG capsule     voriconazole (VFEND) 200 MG tablet     voriconazole (VFEND) 50 MG tablet     No current facility-administered medications for this visit.             Physical Exam:   /75   Pulse 93   Temp 98  F (36.7  C) (Oral)   Resp 18   Wt 60.8 kg (134 lb)   LMP 06/07/2014 (Exact Date)   SpO2 95%   BMI 25.32 kg/m      GENERAL: alert, NAD  HEENT: NCAT, EOMI, no scleral icterus, oral mucosa moist and without lesions  Neck: no cervical or supraclavicular adenopathy  Lungs: moderate airflow; scattered bibasilar crackles  CV: RRR, S1S2, no murmurs noted  Abdomen: normoactive BS, soft, non tender   Lymph: no edema  Neuro: AAO X 3, CN 2-12 grossly intact  Psychiatric: normal affect, good eye contact  Skin: no rash, jaundice or lesions on limited exam         Data:   All laboratory and imaging data reviewed.      Recent Results (from the past 168 hour(s))   General PFT Lab (Please always keep checked)    Collection Time: 09/09/20  7:04 AM   Result Value Ref Range    FVC-Pred 3.26 L    FVC-Pre 1.56 L    FVC-%Pred-Pre 47 %    FEV1-Pre 1.10 L    FEV1-%Pred-Pre 42 %    FEV1FVC-Pred 80 %    FEV1FVC-Pre 70 %    FEFMax-Pred 6.45 L/sec    FEFMax-Pre 3.16 L/sec    FEFMax-%Pred-Pre 49 %    FEF2575-Pred 2.41 L/sec    FEF2575-Pre 0.72 L/sec    JLA6503-%Pred-Pre 29 %    ExpTime-Pre 6.23 sec    FIFMax-Pre 2.75 L/sec    FEV1FEV6-Pred 81 %    FEV1FEV6-Pre 70 %   Hepatic panel    Collection Time: 09/09/20  8:22 AM   Result Value Ref Range    Bilirubin Direct 0.3 (H) 0.0 - 0.2 mg/dL    Bilirubin Total 0.5 0.2 - 1.3 mg/dL    Albumin 3.2 (L) 3.4 - 5.0 g/dL    Protein Total 8.3 6.8 - 8.8 g/dL    Alkaline Phosphatase 652 (H) 40 - 150 U/L    ALT 31 0 - 50 U/L    AST 22 0 - 45 U/L   Magnesium    Collection Time: 09/09/20  8:22 AM   Result Value Ref Range    Magnesium 2.0 1.6 - 2.3 mg/dL   Basic  metabolic panel    Collection Time: 09/09/20  8:22 AM   Result Value Ref Range    Sodium 136 133 - 144 mmol/L    Potassium 4.2 3.4 - 5.3 mmol/L    Chloride 101 94 - 109 mmol/L    Carbon Dioxide 27 20 - 32 mmol/L    Anion Gap 8 3 - 14 mmol/L    Glucose 118 (H) 70 - 99 mg/dL    Urea Nitrogen 39 (H) 7 - 30 mg/dL    Creatinine 2.48 (H) 0.52 - 1.04 mg/dL    GFR Estimate 21 (L) >60 mL/min/[1.73_m2]    GFR Estimate If Black 24 (L) >60 mL/min/[1.73_m2]    Calcium 9.5 8.5 - 10.1 mg/dL   CBC with platelets differential    Collection Time: 09/09/20  8:22 AM   Result Value Ref Range    WBC 13.1 (H) 4.0 - 11.0 10e9/L    RBC Count 3.87 3.8 - 5.2 10e12/L    Hemoglobin 11.9 11.7 - 15.7 g/dL    Hematocrit 38.4 35.0 - 47.0 %    MCV 99 78 - 100 fl    MCH 30.7 26.5 - 33.0 pg    MCHC 31.0 (L) 31.5 - 36.5 g/dL    RDW 14.3 10.0 - 15.0 %    Platelet Count 261 150 - 450 10e9/L    Diff Method Automated Method     % Neutrophils 81.9 %    % Lymphocytes 6.9 %    % Monocytes 8.9 %    % Eosinophils 1.4 %    % Basophils 0.3 %    % Immature Granulocytes 0.6 %    Nucleated RBCs 0 0 /100    Absolute Neutrophil 10.7 (H) 1.6 - 8.3 10e9/L    Absolute Lymphocytes 0.9 0.8 - 5.3 10e9/L    Absolute Monocytes 1.2 0.0 - 1.3 10e9/L    Absolute Eosinophils 0.2 0.0 - 0.7 10e9/L    Absolute Basophils 0.0 0.0 - 0.2 10e9/L    Abs Immature Granulocytes 0.1 0 - 0.4 10e9/L    Absolute Nucleated RBC 0.0      PFT interpretation:  Maneuver: valid, but did not meet ATS guidelines  Normal ratio with decreased FEV1 and FVC    Answers for HPI/ROS submitted by the patient on 9/9/2020   General Symptoms: No  Skin Symptoms: No  HENT Symptoms: No  EYE SYMPTOMS: No  HEART SYMPTOMS: No  LUNG SYMPTOMS: Yes  INTESTINAL SYMPTOMS: No  URINARY SYMPTOMS: No  GYNECOLOGIC SYMPTOMS: No  BREAST SYMPTOMS: No  SKELETAL SYMPTOMS: No  BLOOD SYMPTOMS: No  NERVOUS SYSTEM SYMPTOMS: No  MENTAL HEALTH SYMPTOMS: No

## 2020-09-08 NOTE — PROGRESS NOTES
"Kecia Blue is a 59 year old female who is being evaluated via a billable video visit.      The patient has been notified of following:     \"This video visit will be conducted via a call between you and your physician/provider. We have found that certain health care needs can be provided without the need for an in-person physical exam.  This service lets us provide the care you need with a video conversation.  If a prescription is necessary we can send it directly to your pharmacy.  If lab work is needed we can place an order for that and you can then stop by our lab to have the test done at a later time.    Video visits are billed at different rates depending on your insurance coverage.  Please reach out to your insurance provider with any questions.    If during the course of the call the physician/provider feels a video visit is not appropriate, you will not be charged for this service.\"    Patient has given verbal consent for Video visit? Yes  How would you like to obtain your AVS? MyChart  If you are dropped from the video visit, the video invite should be resent to: Text to cell phone: [phone]  Will anyone else be joining your video visit? No        Video-Visit Details    Type of service:  Video Visit    Video Time: 30 minutes    Originating Location (pt. Location): Home     Distant Location (provider location):  [facility]     Platform used for Video Visit: Doximity  _________________________________________________________________________    Recommendations:  --Recommend switching to PO Posaconazole 300 mg BID x2 doses followed by posaconazole 300 mg once daily  --Will need posaconazole trough level on day 7  --Patient will likely need surgical intervention for resection--will discuss this with lung transplant team.  --started on bactrim HD dosing x 10 days for stenotrophomonas on sputum cx      Follow up with ID in 6 weeks    Vinicio Layton MD  Transplant " Infectious Disease  Pager [phone]    Problem List/Assessement:  1. Aspergillus fumigatus lung infection   2. Prior left Aspergillus empyema  3. S/p lung transplant 3/1/2018 (CMV D+/R+, EBV D+/R+).    -Post transplant course has been complicated by right mainstem bronchial stenosis treated with serial dilations-most recently March 2019,     4. ESRD on iHD  5. Liver dysfunction-undetermined etiology  6. Prior hx of CMV viremia  7. Low level EBV viremia    Overall Aspergillus remains susceptible to voriconazole based on recent susceptibility testing. However, there is progression on CT chest with lung mass like consolidation on repeat imaging while on voriconazole. As a result, patient will likely need surgical intervention w/ resection for cure. She will likely need to be on long term antifungals and therefore would prefer to switch to Posaconazole at this time. Will discuss this plan with transplant team regarding risks vs benefits.     She is currently being worked up for renal transplant. Would prefer Aspergillus infection be managed surgically prior to renal transplantation.    Repeat serum Aspergillus Ag negative. fungitell at 106.  ________________________________________________________________________    HPI:    Patient is a 60 yo female with PMHx significant for ILD, seronegative rheumatoid arthritis and dermatomyositis s/p B/L lung transplantation 3/1/2018 (CMV D+/R+, EBV D+/R+). Post transplant course has been complicated by    -right mainstem bronchial stenosis treated with serial dilations-most recently March 2019,    -Left sided aspergillus empyema 10/08/2019 and    -CMV viremia.   Her immunosuppression includes tacrolimus and prednisone. Her PMHx is also significant for liver dysfunction of unclear etiology with ascites and portal HTN, as well as ESRD now on iHD and being considered for renal transplant  She presents to the ID clinic for further evaluation and management.    Briefly to review relevant  medical history,    --Hospitalized 10/6/19-10/12/19--admitted with 3 month hx of left sided upper abd/chest pain. 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 Underwent CT guided biopsy on 10/8/20 which yielded purulent fluid--cx positive for aspergillus fumigatus. She underwent bronchoscopy with BAL on 10/11/20 which showed septate hyphae.  Notably, she had also grown Actinomyces from multiple BAL specimens in the past. She was started on voriconazole at the time.     --Subsequently seen in ID clinic on 12/18/2019. At the time, patient was continued on voriconazole with plans to repeat imaging.    Patient subsequently underwent repaet CT chest on 7/18/20 at OSH and was noted to have interval increased hypodense mass like region in medial aspect of left lower lung. As a result, patient underwent CT guided lung tissue biopsy. Histopath showed acute inflammation, necrosis and myofibroblastic proliferation with focal fungal hyphae highlighted by GMS.  Cultures positive for Aspergillus fumigatus.    ROS: 10 point ROS unremarkable unless stated otherwise in HPI    Exam:   General: Well appearing, up in chair, in no acute distress  Chest: Normal work of breathing, on room air  Abd: Non distended  Skin: limited exam with no rashes/sores  Neuro: alert and oriented x3 grossly moving all extremities.    Microbiology:  9/9/20 Serum fungitell 106  9/9/20 Serum Aspergillus EIA 0.06  9/9/20 Serum CMV PCR negative  9/9/20 Serum EBV PCR 2,935  9/9/20 Resp Viral panel negative  9/9/20 Sputum Cx: stenotrophomonas maltophilia x2    8/13/20 Lung tissue AFB Cx: NGTD  8/13/20 Lung tissue Bacterial Cx: NGTD  8/13/20 Lung tissue Fungal Cx: Aspergillus fumigatus    2/19/202 Resp viral panel: negative  12/18/2019 Resp viral panel: negative  10/25/19 TB quant gold negative  10/11/19 LLL BAL:   Fungal Cx: Aspergillus fumigatus   AFB Cx: NG   Bacterial Cx: NG   Nocardia Cx: NG   Actinomyces rule out: Actinomyces  odontolyticus   Aspergillus A.57  10/8/19 Pleural fluid Cx: Aspergillus fumigatus  10/7/19 Serum fungitell: 269  10/7/19 Serum Aspergillus Ag 1.13  19 Serum fungitell 122  19 Serum Aspergillus Ag 1.08  19 Sputum Cx: Stenotrophomonas maltophilia    Imagin/18/20 CT chest:  1. Interval increased hypodense masslike region in the medial left lower lung, appearing   contiguous with a trace adjacent left pleural effusion. This may represent source of focal   infection.   2. Trace right pleural effusion.   3. Interval increased compression of T5 without retropulsion and no acute fracture cleft. Bone   density measurement of 87 HU. This value indicates a high likelihood of osteoporosis, and if   clinically indicated, additional testing might be useful to confirm this diagnosis and/or   monitor for treatment related changes.

## 2020-09-09 ENCOUNTER — ANCILLARY PROCEDURE (OUTPATIENT)
Dept: GENERAL RADIOLOGY | Facility: CLINIC | Age: 60
End: 2020-09-09
Attending: INTERNAL MEDICINE
Payer: COMMERCIAL

## 2020-09-09 ENCOUNTER — TELEPHONE (OUTPATIENT)
Dept: TRANSPLANT | Facility: CLINIC | Age: 60
End: 2020-09-09

## 2020-09-09 ENCOUNTER — OFFICE VISIT (OUTPATIENT)
Dept: PULMONOLOGY | Facility: CLINIC | Age: 60
End: 2020-09-09
Attending: PHYSICIAN ASSISTANT
Payer: COMMERCIAL

## 2020-09-09 VITALS
HEART RATE: 93 BPM | BODY MASS INDEX: 25.32 KG/M2 | DIASTOLIC BLOOD PRESSURE: 75 MMHG | SYSTOLIC BLOOD PRESSURE: 116 MMHG | WEIGHT: 134 LBS | TEMPERATURE: 98 F | RESPIRATION RATE: 18 BRPM | OXYGEN SATURATION: 95 %

## 2020-09-09 DIAGNOSIS — Z94.2 S/P LUNG TRANSPLANT (H): ICD-10-CM

## 2020-09-09 DIAGNOSIS — Z94.2 LUNG REPLACED BY TRANSPLANT (H): ICD-10-CM

## 2020-09-09 DIAGNOSIS — E83.42 HYPOMAGNESEMIA: ICD-10-CM

## 2020-09-09 LAB
ALBUMIN SERPL-MCNC: 3.2 G/DL (ref 3.4–5)
ALP SERPL-CCNC: 652 U/L (ref 40–150)
ALT SERPL W P-5'-P-CCNC: 31 U/L (ref 0–50)
ANION GAP SERPL CALCULATED.3IONS-SCNC: 8 MMOL/L (ref 3–14)
AST SERPL W P-5'-P-CCNC: 22 U/L (ref 0–45)
BASOPHILS # BLD AUTO: 0 10E9/L (ref 0–0.2)
BASOPHILS NFR BLD AUTO: 0.3 %
BILIRUB DIRECT SERPL-MCNC: 0.3 MG/DL (ref 0–0.2)
BILIRUB SERPL-MCNC: 0.5 MG/DL (ref 0.2–1.3)
BUN SERPL-MCNC: 39 MG/DL (ref 7–30)
C PNEUM DNA SPEC QL NAA+PROBE: NOT DETECTED
CALCIUM SERPL-MCNC: 9.5 MG/DL (ref 8.5–10.1)
CHLORIDE SERPL-SCNC: 101 MMOL/L (ref 94–109)
CMV DNA SPEC NAA+PROBE-ACNC: NORMAL [IU]/ML
CMV DNA SPEC NAA+PROBE-LOG#: NORMAL {LOG_IU}/ML
CO2 SERPL-SCNC: 27 MMOL/L (ref 20–32)
CREAT SERPL-MCNC: 2.48 MG/DL (ref 0.52–1.04)
DIFFERENTIAL METHOD BLD: ABNORMAL
EOSINOPHIL # BLD AUTO: 0.2 10E9/L (ref 0–0.7)
EOSINOPHIL NFR BLD AUTO: 1.4 %
ERYTHROCYTE [DISTWIDTH] IN BLOOD BY AUTOMATED COUNT: 14.3 % (ref 10–15)
EXPTIME-PRE: 6.23 SEC
FEF2575-%PRED-PRE: 29 %
FEF2575-PRE: 0.72 L/SEC
FEF2575-PRED: 2.41 L/SEC
FEFMAX-%PRED-PRE: 49 %
FEFMAX-PRE: 3.16 L/SEC
FEFMAX-PRED: 6.45 L/SEC
FEV1-%PRED-PRE: 42 %
FEV1-PRE: 1.1 L
FEV1FEV6-PRE: 70 %
FEV1FEV6-PRED: 81 %
FEV1FVC-PRE: 70 %
FEV1FVC-PRED: 80 %
FIFMAX-PRE: 2.75 L/SEC
FLUAV H1 2009 PAND RNA SPEC QL NAA+PROBE: NOT DETECTED
FLUAV H1 RNA SPEC QL NAA+PROBE: NOT DETECTED
FLUAV H3 RNA SPEC QL NAA+PROBE: NOT DETECTED
FLUAV RNA SPEC QL NAA+PROBE: NOT DETECTED
FLUBV RNA SPEC QL NAA+PROBE: NOT DETECTED
FVC-%PRED-PRE: 47 %
FVC-PRE: 1.56 L
FVC-PRED: 3.26 L
GFR SERPL CREATININE-BSD FRML MDRD: 21 ML/MIN/{1.73_M2}
GLUCOSE SERPL-MCNC: 118 MG/DL (ref 70–99)
GRAM STN SPEC: NORMAL
HADV DNA SPEC QL NAA+PROBE: NOT DETECTED
HCOV PNL SPEC NAA+PROBE: NOT DETECTED
HCT VFR BLD AUTO: 38.4 % (ref 35–47)
HGB BLD-MCNC: 11.9 G/DL (ref 11.7–15.7)
HMPV RNA SPEC QL NAA+PROBE: NOT DETECTED
HPIV1 RNA SPEC QL NAA+PROBE: NOT DETECTED
HPIV2 RNA SPEC QL NAA+PROBE: NOT DETECTED
HPIV3 RNA SPEC QL NAA+PROBE: NOT DETECTED
HPIV4 RNA SPEC QL NAA+PROBE: NOT DETECTED
IMM GRANULOCYTES # BLD: 0.1 10E9/L (ref 0–0.4)
IMM GRANULOCYTES NFR BLD: 0.6 %
LYMPHOCYTES # BLD AUTO: 0.9 10E9/L (ref 0.8–5.3)
LYMPHOCYTES NFR BLD AUTO: 6.9 %
Lab: NORMAL
M PNEUMO DNA SPEC QL NAA+PROBE: NOT DETECTED
MAGNESIUM SERPL-MCNC: 2 MG/DL (ref 1.6–2.3)
MCH RBC QN AUTO: 30.7 PG (ref 26.5–33)
MCHC RBC AUTO-ENTMCNC: 31 G/DL (ref 31.5–36.5)
MCV RBC AUTO: 99 FL (ref 78–100)
MICROBIOLOGIST REVIEW: NORMAL
MONOCYTES # BLD AUTO: 1.2 10E9/L (ref 0–1.3)
MONOCYTES NFR BLD AUTO: 8.9 %
NEUTROPHILS # BLD AUTO: 10.7 10E9/L (ref 1.6–8.3)
NEUTROPHILS NFR BLD AUTO: 81.9 %
NRBC # BLD AUTO: 0 10*3/UL
NRBC BLD AUTO-RTO: 0 /100
PLATELET # BLD AUTO: 261 10E9/L (ref 150–450)
POTASSIUM SERPL-SCNC: 4.2 MMOL/L (ref 3.4–5.3)
PROT SERPL-MCNC: 8.3 G/DL (ref 6.8–8.8)
RBC # BLD AUTO: 3.87 10E12/L (ref 3.8–5.2)
RSV RNA SPEC QL NAA+PROBE: NOT DETECTED
RSV RNA SPEC QL NAA+PROBE: NOT DETECTED
RV+EV RNA SPEC QL NAA+PROBE: NOT DETECTED
SODIUM SERPL-SCNC: 136 MMOL/L (ref 133–144)
SPECIMEN SOURCE: NORMAL
SPECIMEN SOURCE: NORMAL
TACROLIMUS BLD-MCNC: 6.4 UG/L (ref 5–15)
TME LAST DOSE: 2100 H
WBC # BLD AUTO: 13.1 10E9/L (ref 4–11)

## 2020-09-09 PROCEDURE — 87205 SMEAR GRAM STAIN: CPT | Performed by: PHYSICIAN ASSISTANT

## 2020-09-09 PROCEDURE — 87799 DETECT AGENT NOS DNA QUANT: CPT | Performed by: PHYSICIAN ASSISTANT

## 2020-09-09 PROCEDURE — 87102 FUNGUS ISOLATION CULTURE: CPT | Performed by: PHYSICIAN ASSISTANT

## 2020-09-09 PROCEDURE — 36415 COLL VENOUS BLD VENIPUNCTURE: CPT | Performed by: PHYSICIAN ASSISTANT

## 2020-09-09 PROCEDURE — G0463 HOSPITAL OUTPT CLINIC VISIT: HCPCS | Mod: 25,ZF

## 2020-09-09 PROCEDURE — 80048 BASIC METABOLIC PNL TOTAL CA: CPT | Performed by: PHYSICIAN ASSISTANT

## 2020-09-09 PROCEDURE — 83735 ASSAY OF MAGNESIUM: CPT | Performed by: PHYSICIAN ASSISTANT

## 2020-09-09 PROCEDURE — 80076 HEPATIC FUNCTION PANEL: CPT | Performed by: PHYSICIAN ASSISTANT

## 2020-09-09 PROCEDURE — 87581 M.PNEUMON DNA AMP PROBE: CPT | Performed by: PHYSICIAN ASSISTANT

## 2020-09-09 PROCEDURE — 87449 NOS EACH ORGANISM AG IA: CPT | Performed by: PHYSICIAN ASSISTANT

## 2020-09-09 PROCEDURE — 87486 CHLMYD PNEUM DNA AMP PROBE: CPT | Performed by: PHYSICIAN ASSISTANT

## 2020-09-09 PROCEDURE — 80197 ASSAY OF TACROLIMUS: CPT | Performed by: PHYSICIAN ASSISTANT

## 2020-09-09 PROCEDURE — 87077 CULTURE AEROBIC IDENTIFY: CPT | Performed by: PHYSICIAN ASSISTANT

## 2020-09-09 PROCEDURE — 87305 ASPERGILLUS AG IA: CPT | Performed by: PHYSICIAN ASSISTANT

## 2020-09-09 PROCEDURE — 85025 COMPLETE CBC W/AUTO DIFF WBC: CPT | Performed by: PHYSICIAN ASSISTANT

## 2020-09-09 PROCEDURE — 87186 SC STD MICRODIL/AGAR DIL: CPT | Performed by: PHYSICIAN ASSISTANT

## 2020-09-09 PROCEDURE — 87633 RESP VIRUS 12-25 TARGETS: CPT | Performed by: PHYSICIAN ASSISTANT

## 2020-09-09 PROCEDURE — 87070 CULTURE OTHR SPECIMN AEROBIC: CPT | Performed by: PHYSICIAN ASSISTANT

## 2020-09-09 RX ORDER — SULFAMETHOXAZOLE AND TRIMETHOPRIM 400; 80 MG/1; MG/1
TABLET ORAL
Qty: 21 TABLET | Refills: 0 | Status: SHIPPED | OUTPATIENT
Start: 2020-09-09 | End: 2020-10-06

## 2020-09-09 RX ORDER — TACROLIMUS 1 MG/1
1 CAPSULE ORAL EVERY EVENING
Qty: 30 CAPSULE | Refills: 11 | Status: SHIPPED | OUTPATIENT
Start: 2020-09-09 | End: 2020-09-23

## 2020-09-09 RX ORDER — TACROLIMUS 0.5 MG/1
0.5 CAPSULE ORAL EVERY MORNING
Qty: 30 CAPSULE | Refills: 11 | Status: SHIPPED | OUTPATIENT
Start: 2020-09-09 | End: 2020-09-23

## 2020-09-09 RX ORDER — ALBUTEROL SULFATE 90 UG/1
2 AEROSOL, METERED RESPIRATORY (INHALATION) EVERY 4 HOURS PRN
Qty: 1 INHALER | Refills: 1 | Status: ON HOLD | COMMUNITY
Start: 2020-09-09 | End: 2021-02-25

## 2020-09-09 ASSESSMENT — PAIN SCALES - GENERAL: PAINLEVEL: NO PAIN (0)

## 2020-09-09 NOTE — LETTER
"    9/9/2020         RE: Kecia Blue  [address] [place] [address]        Dear Colleague,    Thank you for referring your patient, Kecia Blue, to the [facility]. Please see a copy of my visit note below.    [facility]  September 9, 2020         Assessment and Plan:   Kecia Blue is a 59 year old female with history of bilateral lung transplant on 3/1/18 for ILD associated with connective tissue disease for who is seen today for routine follow up.     1. S/p bilateral lung transplant: notes acute on chronic productive cough, mainly in the am, wheeze that improves with coughing and a slight increase in her dyspnea with activity. Notes she is not very active secondary to her knees. Sating 9%% on room air. DSA 2/19/20 negative. CXR reviewed by me today demonstrates perihilar and right basilar atelectasis, appears stable. PFTs didn't meet ATS criteria, but have improved from prior. Review of prior cultures note + steno 9/19, sensitive to Bactrim and Ceftaz.  - Sputum sample including fungal today  - Viral panel  - Start Bactrim with dialysis based dosing X 10 days  - DSA at next f/u  - Continue two drug IS including tacrolimus (goal 80-10) and prednisone (for recurrent infections)   - PCP prophylaxis discontinued for CD4 count > 200 (7/28)    2. Right mainstem bronchial stenosis: s/p dilation, most recently 3/19.   - Monitor    3. Ascites, elevated alk phos and portal hypertension: initially thought to be related to chronic right-sided heart pressures on  liver biopsy with pre-lung transplant PA presssure of 98/45, mean 61 mm hg. However, right heart cath on 3/10/20 showed only mild PAH, with PAP 33/18, mean 24. Possibly related to \"residual\" disease\" from prior congestion with decompensation, at which time she develped ascites. Alk phos elevation likely secondary to her azole use, down " slightly today to 652 with normal enzymes.   - Monitor LFTs  - Following with Hepatology, next 12/21     4. H/o left-sided aspergillus empyema: first noted on 10/08/2019. CT scan repeated on 7/17/20 showed increased mass-like density, likely pleural-based, in left lower lung area s/p needle aspiration on 8/13/20, now + for aspergillus fumigatus. Voriconazole level of 2.1 on 8/31. Seen ID yesterday with plan to follow.  - BD glucan and galactomannan pending  - Continue voriconazole 300 mg BID for now  - LFTs monitoring per above     5. H/o CMV viremia: last CMV on 8/31 negative.   - Pending for today     6. H/o EBV viremia: last level of 8918 on 3/9/20.   - EBV added on to today    7. Dialysis-dependent renal failure: she reports good urine output, but her dialysis team is dubious that her kidneys will improve. Recently changed from 3 times/week HD to to 2 times/week HD on M/Th. Will determine plan for renal transplant work up once we have a plan for the aspergillus in place.      8. HTN: controlled.  - Continue amlodipine and carvedilol    RTC: one month  Influenza and other vaccinations: influenza at next visit  Annual dermatology visit:    Ame Chow PA-C  Pulmonary, Allergy, Critical Care and Sleep Medicine        Interval History:     Overall, feels fine, but does have a cough again, feels it's productive mainly in the am, worse in the last 3 weeks. Occasional coughing at night as well. No fever or chills, no night sweats. Denies congestion, occasional wheezing that resolves with coughing. Does note some increase in shortness of breath with activity, not active secondary to her knees. Does have some more fatigue. No chest pain, no palpitations. No acid reflux or bloating, no gas. Has a BM daily, still producing urine.           Review of Systems:   Please see HPI, otherwise the complete 10 point ROS is negative.           Past Medical and Surgical History:     Past Medical History:   Diagnosis Date      Abdominal pain 10/6/2019     Acute on chronic respiratory failure with hypoxia (H) 2/21/2018     Antisynthetase syndrome (H) 2014     Chronic cough      Chronic infection     recent C diff  8/18     Dehydration 8/1/2018     Dehydration 8/1/2018     Dermatomyositis (H)      Dysplasia of cervix, low grade (ESTRADA 1)      ILD (interstitial lung disease) (H)      Nausea and vomiting 12/4/2018     Osteopenia      PONV (postoperative nausea and vomiting)      Pulmonary hypertension (H)      Raynaud's disease      Seronegative rheumatoid arthritis (H)      Past Surgical History:   Procedure Laterality Date     BRONCHOSCOPY (RIGID OR FLEXIBLE), DIAGNOSTIC N/A 4/10/2018    Procedure: COMBINED BRONCHOSCOPY (RIGID OR FLEXIBLE), LAVAGE;;  Surgeon: Mariposa Donohue MD;  Location: [facility]     BRONCHOSCOPY (RIGID OR FLEXIBLE), DILATE BRONCHUS / TRACHEA N/A 10/11/2018    Procedure: BRONCHOSCOPY (RIGID OR FLEXIBLE), DILATE BRONCHUS / TRACHEA;  Flexible And Rigid Bronchoscopy and Dilation;  Surgeon: Wilber Lin MD;  [facility]     BRONCHOSCOPY FLEXIBLE N/A 3/13/2018    Procedure: BRONCHOSCOPY FLEXIBLE;  Flexible Bronchoscopy ;  Surgeon: Gissell Sanchez MD;  Location: [facility]     BRONCHOSCOPY FLEXIBLE N/A 5/9/2018    Procedure: BRONCHOSCOPY FLEXIBLE;;  Surgeon: Wilber Lin MD;  [facility]     BRONCHOSCOPY FLEXIBLE AND RIGID N/A 9/10/2018    Procedure: BRONCHOSCOPY FLEXIBLE AND RIGID;  Flexible and Rigid Bronchoscopy with Balloon Dilation, tissue debulking with cryo, and Right mainstem bronchus stent placement;  Surgeon: Wilber Lin MD;  [facility]     BRONCHOSCOPY RIGID N/A 6/6/2018    Procedure: BRONCHOSCOPY RIGID;;  Surgeon: Lopez Macias MD;  [facility]     BRONCHOSCOPY, DILATE BRONCHUS, STENT BRONCHUS, COMBINED N/A 6/11/2018    Procedure: COMBINED BRONCHOSCOPY, DILATE BRONCHUS, STENT BRONCHUS;  Flexible Bronchoscopy, Balloon Dilation, Bronchial Washings;  Surgeon:  Wilber Lin MD;  [facility]     BRONCHOSCOPY, DILATE BRONCHUS, STENT BRONCHUS, COMBINED Right 7/10/2018    Procedure: COMBINED BRONCHOSCOPY, DILATE BRONCHUS, STENT BRONCHUS;  Flexible Bronchoscopy, Balloon Dilation, Bronchial Washings  ;  Surgeon: Wilber Lin MD;  [facility]     BRONCHOSCOPY, DILATE BRONCHUS, STENT BRONCHUS, COMBINED N/A 8/2/2018    Procedure: COMBINED BRONCHOSCOPY, DILATE BRONCHUS, STENT BRONCHUS;  Flexible Bronchoscopy, Bronchial Washings, Balloon Dilation;  Surgeon: Wilber Lin MD;  [facility]     BRONCHOSCOPY, DILATE BRONCHUS, STENT BRONCHUS, COMBINED N/A 8/20/2018    Procedure: COMBINED BRONCHOSCOPY, DILATE BRONCHUS, STENT BRONCHUS;  Flexible Bronchoscopy, Balloon Dilation;  Surgeon: Wilber Lin MD;  [facility]     BRONCHOSCOPY, DILATE BRONCHUS, STENT BRONCHUS, COMBINED N/A 10/29/2018    Procedure: Flexible Bronchoscopy, Balloon Dilation, Stent Revision, Airway Examination And Therapeutic Suctioning, Cyro Tumor Debulking;  Surgeon: Wilber Lin MD;  [facility]     BRONCHOSCOPY, DILATE BRONCHUS, STENT BRONCHUS, COMBINED N/A 11/20/2018    Procedure: Rigid Bronchoscopy, Stent Removal and dilitation;  Surgeon: Wilber Lin MD;  [facility]     BRONCHOSCOPY, DILATE BRONCHUS, STENT BRONCHUS, COMBINED N/A 12/14/2018    Procedure: Flexible And Rigid Bronchoscopy, Balloon Dilation, Bronchial Washing;  Surgeon: Wilber Lin MD;  [facility]     BRONCHOSCOPY, DILATE BRONCHUS, STENT BRONCHUS, COMBINED N/A 1/17/2019    Procedure: Flexible And Rigid Bronchoscopy, Balloon Dilation.;  Surgeon: Wilber Lin MD;  [facility]     BRONCHOSCOPY, DILATE BRONCHUS, STENT BRONCHUS, COMBINED N/A 3/7/2019    Procedure: Flexible and Rigid Bronchoscopy, Bronchial Washing, Balloon Dilation;  Surgeon: Wilber Lin MD;  [facility]     BRONCHOSCOPY, DILATE BRONCHUS, STENT BRONCHUS, COMBINED  N/A 6/6/2019    Procedure: Rigid and Flexible Bronchoscopy, Balloon Dilation;  Surgeon: Wilber Lin MD;  [facility]     BRONCHOSCOPY, DILATE BRONCHUS, STENT BRONCHUS, COMBINED N/A 10/11/2019    Procedure: Flexible and Rigid Bronchoscopy, Balloon Dilation, Bronchoalveolar Lagave;  Surgeon: Wilber Lin MD;  [facility]     CV RIGHT HEART CATH N/A 3/10/2020    Procedure: CV RIGHT HEART CATH;  Surgeon: Wai Garcia MD;  [facility]     ENT SURGERY      tonsillectomy as a child     ESOPHAGOSCOPY, GASTROSCOPY, DUODENOSCOPY (EGD), COMBINED N/A 10/29/2018    Procedure: COMBINED ESOPHAGOSCOPY, GASTROSCOPY, DUODENOSCOPY (EGD) with biopsies and polypectomy;  Surgeon: Chente Bloom MD;  [facility]     INSERT EXTRACORPORAL MEMBRANE OXYGENATOR ADULT (OUTSIDE OR) N/A 2/27/2018    Procedure: INSERT EXTRACORPORAL MEMBRANE OXYGENATOR ADULT (OUTSIDE OR);  INSERT EXTRACORPORAL MEMBRANE OXYGENATOR ADULT (OUTSIDE OR) ;  Surgeon: Toby Hernandez MD;  [facility]     IR CVC TUNNEL PLACEMENT > 5 YRS OF AGE  10/25/2019     IR PARACENTESIS  1/8/2020     IR THORACENTESIS  9/13/2019     IR TRANSCATHETER BIOPSY  1/8/2020     no prior surgery       REMOVE EXTRACORPORAL MEMBRANE OXYGENATOR ADULT N/A 3/3/2018    Procedure: REMOVE EXTRACORPORAL MEMBRANE OXYGENATOR ADULT;  Removal of Right Femoral Venous and Right Axillary Arterial Extracorporeal Membrane Oxygenator;  Surgeon: Toby Hernandez MD;  [facility]     TRANSPLANT LUNG RECIPIENT SINGLE X2 Bilateral 3/1/2018    Procedure: TRANSPLANT LUNG RECIPIENT SINGLE X2;  Median Sternotomy, Extracorporeal Membrane Oxygenator, bilateral sequential lung transplant;  Surgeon: Toby Hernandez MD;  [facility]           Family History:     Family History   Problem Relation Age of Onset     Hypertension Mother      Arthritis Mother      Pancreatic Cancer Father      Diabetes Father              Social History:     Social History     Socioeconomic History     Marital status:      Spouse name: Not on file     Number of children: Not on file     Years of education: Not on file     Highest education level: Not on file   Occupational History     Not on file   Social Needs     Financial resource strain: Not on file     Food insecurity     Worry: Not on file     Inability: Not on file     Transportation needs     Medical: Not on file     Non-medical: Not on file   Tobacco Use     Smoking status: Never Smoker     Smokeless tobacco: Never Used   Substance and Sexual Activity     Alcohol use: No     Alcohol/week: 1.0 standard drinks     Types: 1 Glasses of wine per week     Drug use: No     Sexual activity: Not on file   Lifestyle     Physical activity     Days per week: Not on file     Minutes per session: Not on file     Stress: Not on file   Relationships     Social connections     Talks on phone: Not on file     Gets together: Not on file     Attends Anabaptism service: Not on file     Active member of club or organization: Not on file     Attends meetings of clubs or organizations: Not on file     Relationship status: Not on file     Intimate partner violence     Fear of current or ex partner: Not on file     Emotionally abused: Not on file     Physically abused: Not on file     Forced sexual activity: Not on file   Other Topics Concern     Parent/sibling w/ CABG, MI or angioplasty before 65F 55M? No   Social History Narrative    3/6/2019 - Lives with . Has three daughters. Four grandchildren (two ). No pets. Travelled previously to [facility]. Has visited [place] several times.             Medications:     Current Outpatient Medications   Medication     albuterol (PROAIR HFA/PROVENTIL HFA/VENTOLIN HFA) 108 (90 Base) MCG/ACT inhaler     sulfamethoxazole-trimethoprim (BACTRIM) 400-80 MG tablet     amLODIPine (NORVASC) 5 MG tablet     B Complex-C-Folic Acid (DIALYVITE) TABS      calcium-vitamin D (CALTRATE) 600-400 MG-UNIT per tablet     carvedilol (COREG) 25 MG tablet     ferrous sulfate (FEROSUL) 325 (65 Fe) MG tablet     magnesium oxide (MAG-OX) 400 MG tablet     multivitamin, therapeutic with minerals (THERA-VIT-M) TABS tablet     pantoprazole (PROTONIX) 40 MG EC tablet     predniSONE 2.5 MG PO tablet     tacrolimus (GENERIC EQUIVALENT) 0.5 MG capsule     tacrolimus (GENERIC EQUIVALENT) 1 MG capsule     voriconazole (VFEND) 200 MG tablet     voriconazole (VFEND) 50 MG tablet     No current facility-administered medications for this visit.             Physical Exam:   /75   Pulse 93   Temp 98  F (36.7  C) (Oral)   Resp 18   Wt 60.8 kg (134 lb)   LMP 06/07/2014 (Exact Date)   SpO2 95%   BMI 25.32 kg/m      GENERAL: alert, NAD  HEENT: NCAT, EOMI, no scleral icterus, oral mucosa moist and without lesions  Neck: no cervical or supraclavicular adenopathy  Lungs: moderate airflow; scattered bibasilar crackles  CV: RRR, S1S2, no murmurs noted  Abdomen: normoactive BS, soft, non tender   Lymph: no edema  Neuro: AAO X 3, CN 2-12 grossly intact  Psychiatric: normal affect, good eye contact  Skin: no rash, jaundice or lesions on limited exam         Data:   All laboratory and imaging data reviewed.      Recent Results (from the past 168 hour(s))   General PFT Lab (Please always keep checked)    Collection Time: 09/09/20  7:04 AM   Result Value Ref Range    FVC-Pred 3.26 L    FVC-Pre 1.56 L    FVC-%Pred-Pre 47 %    FEV1-Pre 1.10 L    FEV1-%Pred-Pre 42 %    FEV1FVC-Pred 80 %    FEV1FVC-Pre 70 %    FEFMax-Pred 6.45 L/sec    FEFMax-Pre 3.16 L/sec    FEFMax-%Pred-Pre 49 %    FEF2575-Pred 2.41 L/sec    FEF2575-Pre 0.72 L/sec    JNN2762-%Pred-Pre 29 %    ExpTime-Pre 6.23 sec    FIFMax-Pre 2.75 L/sec    FEV1FEV6-Pred 81 %    FEV1FEV6-Pre 70 %   Hepatic panel    Collection Time: 09/09/20  8:22 AM   Result Value Ref Range    Bilirubin Direct 0.3 (H) 0.0 - 0.2 mg/dL    Bilirubin Total 0.5 0.2  - 1.3 mg/dL    Albumin 3.2 (L) 3.4 - 5.0 g/dL    Protein Total 8.3 6.8 - 8.8 g/dL    Alkaline Phosphatase 652 (H) 40 - 150 U/L    ALT 31 0 - 50 U/L    AST 22 0 - 45 U/L   Magnesium    Collection Time: 09/09/20  8:22 AM   Result Value Ref Range    Magnesium 2.0 1.6 - 2.3 mg/dL   Basic metabolic panel    Collection Time: 09/09/20  8:22 AM   Result Value Ref Range    Sodium 136 133 - 144 mmol/L    Potassium 4.2 3.4 - 5.3 mmol/L    Chloride 101 94 - 109 mmol/L    Carbon Dioxide 27 20 - 32 mmol/L    Anion Gap 8 3 - 14 mmol/L    Glucose 118 (H) 70 - 99 mg/dL    Urea Nitrogen 39 (H) 7 - 30 mg/dL    Creatinine 2.48 (H) 0.52 - 1.04 mg/dL    GFR Estimate 21 (L) >60 mL/min/[1.73_m2]    GFR Estimate If Black 24 (L) >60 mL/min/[1.73_m2]    Calcium 9.5 8.5 - 10.1 mg/dL   CBC with platelets differential    Collection Time: 09/09/20  8:22 AM   Result Value Ref Range    WBC 13.1 (H) 4.0 - 11.0 10e9/L    RBC Count 3.87 3.8 - 5.2 10e12/L    Hemoglobin 11.9 11.7 - 15.7 g/dL    Hematocrit 38.4 35.0 - 47.0 %    MCV 99 78 - 100 fl    MCH 30.7 26.5 - 33.0 pg    MCHC 31.0 (L) 31.5 - 36.5 g/dL    RDW 14.3 10.0 - 15.0 %    Platelet Count 261 150 - 450 10e9/L    Diff Method Automated Method     % Neutrophils 81.9 %    % Lymphocytes 6.9 %    % Monocytes 8.9 %    % Eosinophils 1.4 %    % Basophils 0.3 %    % Immature Granulocytes 0.6 %    Nucleated RBCs 0 0 /100    Absolute Neutrophil 10.7 (H) 1.6 - 8.3 10e9/L    Absolute Lymphocytes 0.9 0.8 - 5.3 10e9/L    Absolute Monocytes 1.2 0.0 - 1.3 10e9/L    Absolute Eosinophils 0.2 0.0 - 0.7 10e9/L    Absolute Basophils 0.0 0.0 - 0.2 10e9/L    Abs Immature Granulocytes 0.1 0 - 0.4 10e9/L    Absolute Nucleated RBC 0.0      PFT interpretation:  Maneuver: valid, but did not meet ATS guidelines  Normal ratio with decreased FEV1 and FVC    Answers for HPI/ROS submitted by the patient on 9/9/2020   General Symptoms: No  Skin Symptoms: No  HENT Symptoms: No  EYE SYMPTOMS: No  HEART SYMPTOMS: No  LUNG SYMPTOMS:  Yes  INTESTINAL SYMPTOMS: No  URINARY SYMPTOMS: No  GYNECOLOGIC SYMPTOMS: No  BREAST SYMPTOMS: No  SKELETAL SYMPTOMS: No  BLOOD SYMPTOMS: No  NERVOUS SYSTEM SYMPTOMS: No  MENTAL HEALTH SYMPTOMS: No      Transplant Coordinator Note    Reason for visit: Post lung transplant follow up visit   Coordinator: Present - on phone  Caregiver:  Ranjan,     Health concerns addressed today:  1. Saw ID yesterday - fungus is still there, not sure why voriconazole is not working, will discuss with colleague and decide plan going forward  2. Have a cough - cough up sputum every morning, started to ramp up about 3 months ago  3. Dialysis (now two days a week) - says can hear a wheeze. Patient not able to feel wheezing. Patient states wheezing usually goes away with coughing. Feels short of breath sometimes - from over exertion       Activity/rehab: up ad diego  Oxygen needs: room air  Pain management/RX: denies  Diabetic management: na  Next Bronch due: na  High risk donor: yes  CMV status: D+/R+  Valcyte stopped:   DVT/PE: H/o DVT  Post op AFIB/follow up with EP: one time occurrence of a-fib  PJP prophylactic: Dapsone    Pt Education: medications (use/dose/side effects), how/when to call coordinator, frequency of labs, s/s of infection/rejection, call prior to starting any new medications, lab/vital sign book    Health Maintenance:     Last colonoscopy:     Next colonoscopy due:     Dermatology:    Vaccinations this visit:     Labs, CXR, PFTs reviewed with patient  Medication record reviewed and reconciled  Questions and concerns addressed    Patient Instructions  1. Continue to hydrate with 60-70 oz fluids daily.  2. Continue to exercise daily or most days of the week.  3. Follow up with your primary care provider for annual gender health maintenance procedures.  4. Follow up with colonoscopy schedule.  5. Follow up with annual dermatology visits.  6. We are going to start you Bactrim for your cough. First dose is 2 tablets,  followed by one tablet 12hrs later, and the next day one tablet 12hrs apart for a total of 10 days total.   7. For the Bactrim, both doses of the Bactrim should be taken after dialysis, one right after dialysis and one at bedtime.       Next transplant clinic appointment: 1 month with CXR, labs and PFTs  Next lab draw: 2 weeks      AVS printed at time of check out          Again, thank you for allowing me to participate in the care of your patient.        Sincerely,        Ame Chow PA-C

## 2020-09-09 NOTE — PROGRESS NOTES
Transplant Coordinator Note    Reason for visit: Post lung transplant follow up visit   Coordinator: Present - on phone  Caregiver:  Ranjan,     Health concerns addressed today:  1. Saw ID yesterday - fungus is still there, not sure why voriconazole is not working, will discuss with colleague and decide plan going forward  2. Have a cough - cough up sputum every morning, started to ramp up about 3 months ago  3. Dialysis (now two days a week) - says can hear a wheeze. Patient not able to feel wheezing. Patient states wheezing usually goes away with coughing. Feels short of breath sometimes - from over exertion       Activity/rehab: up ad [place]go  Oxygen needs: room air  Pain management/RX: denies  Diabetic management: na  Next Bronch due: na  High risk donor: yes  CMV status: D+/R+  Valcyte stopped:   DVT/PE: H/o DVT  Post op AFIB/follow up with EP: one time occurrence of a-fib  PJP prophylactic: Dapsone    Pt Education: medications (use/dose/side effects), how/when to call coordinator, frequency of labs, s/s of infection/rejection, call prior to starting any new medications, lab/vital sign book    Health Maintenance:     Last colonoscopy:     Next colonoscopy due:     Dermatology:    Vaccinations this visit:     Labs, CXR, PFTs reviewed with patient  Medication record reviewed and reconciled  Questions and concerns addressed    Patient Instructions  1. Continue to hydrate with 60-70 oz fluids daily.  2. Continue to exercise daily or most days of the week.  3. Follow up with your primary care provider for annual gender health maintenance procedures.  4. Follow up with colonoscopy schedule.  5. Follow up with annual dermatology visits.  6. We are going to start you Bactrim for your cough. First dose is 2 tablets, followed by one tablet 12hrs later, and the next day one tablet 12hrs apart for a total of 10 days total.   7. For the Bactrim, both doses of the Bactrim should be taken after dialysis, one right after  dialysis and one at bedtime.       Next transplant clinic appointment: 1 month with CXR, labs and PFTs  Next lab draw: 2 weeks      AVS printed at time of check out

## 2020-09-09 NOTE — NURSING NOTE
Chief Complaint   Patient presents with     Lung Transplant     follow up      Medications reviewed and updated.  Vitals taken  Ciara Lopez CMA

## 2020-09-09 NOTE — TELEPHONE ENCOUNTER
Tacrolimus level 6.4 at 12 hours, on 9/9/20  Goal 8-10.   Current dose 0.5 mg in AM, 0.5 mg in PM    Dose changed to 0.5 mg in AM, 1 mg in PM   Recheck level in 7-14 days    Discussed with patient.   RVP negative. Sputum culture pending.

## 2020-09-09 NOTE — PATIENT INSTRUCTIONS
Patient Instructions  1. Continue to hydrate with 60-70 oz fluids daily.  2. Continue to exercise daily or most days of the week.  3. Follow up with your primary care provider for annual gender health maintenance procedures.  4. Follow up with colonoscopy schedule.  5. Follow up with annual dermatology visits.  6. We are going to start you Bactrim for your cough. First dose is 2 tablets, followed by one tablet 12hrs later, and the next day one tablet 12hrs apart for a total of 10 days total.   7. For the Bactrim, both doses of the Bactrim should be taken after dialysis, one right after dialysis and one at bedtime.       Next transplant clinic appointment: 1 month with CXR, labs and PFTs  Next lab draw: 2 weeks    ~~~~~~~~~~~~~~~~~~~~~~~~~    Thoracic Transplant Office phone [phone], fax [phone]    Office Hours 8:30 - 5:00     For after-hours urgent issues, please dial [phone], and ask to speak with the Thoracic Transplant Coordinator  On-Call, pager [phone].  --------------------  To expedite your medication refill(s), please contact your pharmacy and have them fax a refill request to: [phone]  .   *Please allow 3 business days for routine medication refills.  *Please allow 5 business days for controlled substance medication refills.    **For Diabetic medications and supplies refill(s), please contact your pharmacy and have them  Contact your Endocrine team.  --------------------  For scheduling appointments call [phone].  --------------------  Please Note: If you are active on [employer], all future test results will be sent by [employer] message only, and will no longer be called to patient. You may also receive communication directly from your physician.

## 2020-09-10 LAB
1,3 BETA GLUCAN SER-MCNC: 106 PG/ML
B-D GLUCAN INTERPRETATION (1,3): POSITIVE
BACTERIA SPEC CULT: NORMAL
EBV DNA # SPEC NAA+PROBE: 2935 {COPIES}/ML
EBV DNA SPEC NAA+PROBE-LOG#: 3.5 {LOG_COPIES}/ML
FUNGUS SPEC CULT: ABNORMAL
GALACTOMANNAN AG SERPL QL IA: NEGATIVE
GALACTOMANNAN AG SERPL-ACNC: 0.06
SPECIMEN SOURCE: ABNORMAL
SPECIMEN SOURCE: NORMAL

## 2020-09-12 NOTE — TELEPHONE ENCOUNTER
Spoke with patient to schedule procedure with Dr. Alana Lin   Procedure was scheduled on 06/11 at 12pm  Patient will have H&P: TBD  Patient is aware a / is needed day of surgery.   Surgery letter was sent via [employer], patient has my direct contact information for any further questions.      Patient

## 2020-09-14 DIAGNOSIS — Z79.899 ENCOUNTER FOR LONG-TERM (CURRENT) USE OF HIGH-RISK MEDICATION: ICD-10-CM

## 2020-09-14 DIAGNOSIS — Z94.2 S/P LUNG TRANSPLANT (H): Primary | ICD-10-CM

## 2020-09-15 LAB
BACTERIA SPEC CULT: ABNORMAL
SPECIMEN SOURCE: ABNORMAL

## 2020-09-21 ENCOUNTER — TELEPHONE (OUTPATIENT)
Dept: PULMONOLOGY | Facility: CLINIC | Age: 60
End: 2020-09-21

## 2020-09-21 DIAGNOSIS — Z94.2 LUNG REPLACED BY TRANSPLANT (H): ICD-10-CM

## 2020-09-21 PROCEDURE — 80197 ASSAY OF TACROLIMUS: CPT | Performed by: PHYSICIAN ASSISTANT

## 2020-09-21 NOTE — TELEPHONE ENCOUNTER
[facility] Call Center    Phone Message    May a detailed message be left on voicemail: yes     Reason for Call: Other: Carlotta from [facility] lab is requesting a call back from a nurse to discuss a pts critical lab value. Phone:[phone]. Thank you     Action Taken: Message routed to:  [facility] ([facility]): Pulm    Travel Screening: Not Applicable

## 2020-09-21 NOTE — TELEPHONE ENCOUNTER
Spoke with lab, reporting critical alk phos of 573 today. This is consistent with baseline, actually improved from most recent checks.

## 2020-09-22 LAB
TACROLIMUS BLD-MCNC: 6.7 UG/L (ref 5–15)
TME LAST DOSE: NORMAL H

## 2020-09-23 ENCOUNTER — TELEPHONE (OUTPATIENT)
Dept: TRANSPLANT | Facility: CLINIC | Age: 60
End: 2020-09-23

## 2020-09-23 DIAGNOSIS — Z94.2 S/P LUNG TRANSPLANT (H): ICD-10-CM

## 2020-09-23 RX ORDER — TACROLIMUS 1 MG/1
1 CAPSULE ORAL 2 TIMES DAILY
Qty: 60 CAPSULE | Refills: 11 | Status: SHIPPED | OUTPATIENT
Start: 2020-09-23 | End: 2020-10-08

## 2020-09-23 NOTE — TELEPHONE ENCOUNTER
Tacrolimus level 6.7 at 12 hours, on 9/21/20  Goal 8-10.   Current dose 0.5 mg in AM, 1 mg in PM    Dose changed to 1 mg in AM, 1 mg in PM   Recheck level in 7-14 days    Discussed with patient.    [employer] message sent     Informed patient discussing with surgeons tomorrow re lung mass, may be switching antifungal. Will be in touch with patient re plan.

## 2020-09-23 NOTE — TELEPHONE ENCOUNTER
Provider Call: Medication Clarification  Route to LPN  Name of Medication: Tacro ; Question: Needs to clarify med   Pharmacy Name: [facility]  Pharmacy Location: [place]   Callback needed? Yes ext- [phone]    Return Call Needed  Same as documented in contacts section  When to return call?: Greater than one day: Route standard priority

## 2020-10-01 ENCOUNTER — TELEPHONE (OUTPATIENT)
Dept: TRANSPLANT | Facility: CLINIC | Age: 60
End: 2020-10-01

## 2020-10-01 DIAGNOSIS — B44.9 ASPERGILLUS (H): Primary | ICD-10-CM

## 2020-10-01 DIAGNOSIS — Z94.2 LUNG REPLACED BY TRANSPLANT (H): ICD-10-CM

## 2020-10-01 RX ORDER — POSACONAZOLE 100 MG/1
300 TABLET, DELAYED RELEASE ORAL DAILY
Qty: 90 TABLET | Refills: 2 | Status: SHIPPED | OUTPATIENT
Start: 2020-10-01 | End: 2021-01-12

## 2020-10-01 NOTE — PROGRESS NOTES
Xray orders faxed to [facility] in [place] for 10/5. Patient will do labs and xray before her appointment on 10/6.

## 2020-10-01 NOTE — LETTER
PHYSICIAN ORDERS      DATE & TIME ISSUED: 2020 5:00 PM  PATIENT NAME: Kecia Blue   : 1960     [facility] MR# [if applicable]: [id number]     DIAGNOSIS:  Lung Transplant  Z94.2    10/5/20:  2 view chest x-ray    Any questions please call: Mikayla     Please fax these results to [phone].        Ame Chow PA-C

## 2020-10-01 NOTE — LETTER
PHYSICIAN ORDERS      DATE & TIME ISSUED: 2020 5:00 PM  PATIENT NAME: Kecia Blue   : 1960     [facility] MR# [if applicable]: [id number]     DIAGNOSIS:  Lung Transplant  Z94.2    10/5/20:  2 view chest x-ray    Any questions please call: Mikayla     Please fax these results to [phone].        Ame Chwo PA-C

## 2020-10-01 NOTE — TELEPHONE ENCOUNTER
Per Dr. Mcelroy/ID, plan to stop voriconazole and switch to posaconazole. Dose is 300 mg BID day 1, then 300 mg daily. LFTs at 1 week and 3 weeks. Posaconazole level at 1 week. Will stop vori, then start posa the following day. Patient will call once she has the med to review plan and arrange for labs. Will also arrange for patient to see Dr. Marvin in clinic virtually to discuss possible interventions for lung empyema.   Patient has appointment next week, isn't able to come down due to ride issue/harvesting. She reports Bactrim made her very constipated and doesn't want to take it again. Reports it didn't completely clear up her cough. She does feel better than a month ago though. Less [place], able to do more things. Will discuss with Ame.

## 2020-10-02 DIAGNOSIS — Z94.2 S/P LUNG TRANSPLANT (H): ICD-10-CM

## 2020-10-02 DIAGNOSIS — J86.9 EMPYEMA OF LUNG (H): ICD-10-CM

## 2020-10-02 DIAGNOSIS — J84.9 ILD (INTERSTITIAL LUNG DISEASE) (H): Primary | ICD-10-CM

## 2020-10-05 ENCOUNTER — TRANSFERRED RECORDS (OUTPATIENT)
Dept: HEALTH INFORMATION MANAGEMENT | Facility: CLINIC | Age: 60
End: 2020-10-05

## 2020-10-05 ENCOUNTER — TELEPHONE (OUTPATIENT)
Dept: TRANSPLANT | Facility: CLINIC | Age: 60
End: 2020-10-05

## 2020-10-05 DIAGNOSIS — Z94.2 LUNG REPLACED BY TRANSPLANT (H): ICD-10-CM

## 2020-10-05 PROCEDURE — 80197 ASSAY OF TACROLIMUS: CPT | Performed by: PHYSICIAN ASSISTANT

## 2020-10-05 ASSESSMENT — ENCOUNTER SYMPTOMS
WHEEZING: 0
COUGH: 1
DYSPNEA ON EXERTION: 1
HEMOPTYSIS: 0
SPUTUM PRODUCTION: 1
SNORES LOUDLY: 0
POSTURAL DYSPNEA: 0
COUGH DISTURBING SLEEP: 1
SHORTNESS OF BREATH: 1

## 2020-10-05 NOTE — TELEPHONE ENCOUNTER
DATE:  10/5/2020   TIME OF RECEIPT FROM LAB:  1103  LAB TEST:  Alk Phos  LAB VALUE:  617  RESULTS GIVEN WITH READ-BACK TO (PROVIDER):  Mikayla Latham  TIME LAB VALUE REPORTED TO PROVIDER:   1115

## 2020-10-06 ENCOUNTER — VIRTUAL VISIT (OUTPATIENT)
Dept: PULMONOLOGY | Facility: CLINIC | Age: 60
End: 2020-10-06
Attending: PHYSICIAN ASSISTANT
Payer: COMMERCIAL

## 2020-10-06 ENCOUNTER — MYC MEDICAL ADVICE (OUTPATIENT)
Dept: TRANSPLANT | Facility: CLINIC | Age: 60
End: 2020-10-06

## 2020-10-06 DIAGNOSIS — Z94.2 S/P LUNG TRANSPLANT (H): Primary | ICD-10-CM

## 2020-10-06 PROCEDURE — 99214 OFFICE O/P EST MOD 30 MIN: CPT | Mod: 95 | Performed by: PHYSICIAN ASSISTANT

## 2020-10-06 RX ORDER — MINOCYCLINE HYDROCHLORIDE 100 MG/1
100 TABLET ORAL 2 TIMES DAILY
Qty: 28 TABLET | Refills: 0 | Status: SHIPPED | OUTPATIENT
Start: 2020-10-06 | End: 2020-10-20

## 2020-10-06 RX ORDER — FLUTICASONE PROPIONATE 50 MCG
1 SPRAY, SUSPENSION (ML) NASAL DAILY
Qty: 15.8 ML | Refills: 0 | Status: SHIPPED | OUTPATIENT
Start: 2020-10-06 | End: 2021-09-15

## 2020-10-06 RX ORDER — BENZONATATE 100 MG/1
100 CAPSULE ORAL 3 TIMES DAILY PRN
Qty: 100 CAPSULE | Refills: 0 | Status: SHIPPED | OUTPATIENT
Start: 2020-10-06 | End: 2020-12-09

## 2020-10-06 NOTE — LETTER
10/6/2020         RE: Kecia Blue  [address] [place] [address]        Dear Colleague,    Thank you for referring your patient, Kecia Blue, to the [facility] FOR LUNG SCIENCE AND [facility] [place]. Please see a copy of my visit note below.    Please see other notes from today.         Kecia Blue is a 59 year old female who is being evaluated via a billable video visit. She has a history of bilateral lung transplant on 3/1/18 for ILD associated with connective tissue disease. 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      1. S/p bilateral lung transplant: ongoing frequent cough, increase over prior, intermittently productive, feels like it comes from her mid chest and upper airway. Denies acid reflux, notes sinus congestion, but unclear on PND. No tightness, dyspnea with activity is unchanged. Notes the Bactrim didn't help her; prior culture + for two strains P-S steno. Sating 95 % on room air. DSA 2/19/20 negative. CXR from OSH reviewed by me today demonstrates perihilar and basilar atelectasis; reviewed with radiology and they feel the RLL opacification is likely increased atelectasisl. No PFTs today. Will treat steno with minocycline and reassess in one week need for IV ceftaz.  - Start minocycline 100 mg BID X 14 days  - Repeat sputum bacterial and fungal if able  - Flonase 1 spray daily  - Tessalon perles TID PRN  - Continue two drug IS including tacrolimus (goal 80-10) and prednisone (for recurrent infections)   - PCP prophylaxis discontinued for CD4 count > 200 (7/28)     2. Right mainstem bronchial stenosis: s/p dilation, most recently 3/19.   - Monitor     3. Ascites, elevated alk phos and portal hypertension: initially thought to be related to chronic elevated right-sided heart pressures on liver biopsy with pre-lung transplant PA presssure of 98/45, mean 61 mm hg. However, right heart cath on  "3/10/20 showed only mild PAH, with PAP 33/18, mean 24. Possibly related to \"residual\" disease\" from prior congestion with decompensation, at which time she develped ascites. Alk phos elevation likely secondary to her azole use, stable.   - Monitor LFTs  - Following with Hepatology, next 12/21     4. H/o left-sided aspergillus empyema: first noted on 10/08/2019. CT scan repeated on 7/17/20 showed increased mass-like density, likely pleural-based, in left lower lung area s/p needle aspiration on 8/13/20, now + for aspergillus fumigatus. Unclear plan for mass currently. Is on voriconazole with plan to change to posaconazole when it arrives. Seen ID yesterday with plan to follow.  - Continue voriconazole until posaconazole arrives  - Posaconazole level 7-10 days after initiation  - LFTs monitoring per above  - Virtual appt with Dr. Marvin tomorrow     5. H/o CMV viremia: last CMV on 9/21 negative.   - Pending for today     6. H/o EBV viremia: last level of 2935 on 9/9/20.  - EBV at next f/u     7. Dialysis-dependent renal failure: changed from 3 times/week HD to to 2 times/week HD on M/Th. Will determine plan for renal transplant work up once we have a plan for the aspergillus in place.      8. HTN: controlled.  - Continue amlodipine and carvedilol     RTC: 6 weeks  Influenza and other vaccinations: influenza at dialysis on Thursday per Kecia  Annual dermatology visit:     Ame Chow PA-C  Pulmonary, Allergy, Critical Care and Sleep Medicine    Interval history: patient is feeling okay, but cannot quit coughing. Bactrim didn't help. Cough is more frequent than a month ago, not more productive. Mucous is clear, does have some sinus and chest congestion, the cough is near continuous. Shortness of breath with stairs or activity outside, not changed from her baseline, no tightness. Doesn't seem to be triggered by anything specific. No fever or chills, feels like it is between her upper chest and nose. No running hose or " "PND. No chest pain or pain with coughing. No palpitations. No GI complaints.     Vitals: /74, O2 sat of 95%    GENERAL: Healthy, alert and no distress  EYES: Eyes grossly normal to inspection.  No discharge or erythema, or obvious scleral/conjunctival abnormalities  RESP: No audible wheeze, cough, or visible cyanosis.  No visible retractions or increased work of breathing  SKIN: Visible skin clear. No significant rash, abnormal pigmentation or lesions  NEURO: Cranial nerves grossly intact.  Mentation and speech appropriate for age  PSYCH: Mentation appears normal, affect normal/bright, judgement and insight intact, normal speech and appearance well-groomed    The patient has been notified of following:     \"This video visit will be conducted via a call between you and your physician/provider. We have found that certain health care needs can be provided without the need for an in-person physical exam.  This service lets us provide the care you need with a video conversation.  If a prescription is necessary we can send it directly to your pharmacy.  If lab work is needed we can place an order for that and you can then stop by our lab to have the test done at a later time.    Video visits are billed at different rates depending on your insurance coverage.  Please reach out to your insurance provider with any questions.    If during the course of the call the physician/provider feels a video visit is not appropriate, you will not be charged for this service.\"    Patient has given verbal consent for Video visit? Yes  How would you like to obtain your AVS? MyChart  If you are dropped from the video visit, the video invite should be resent to: Text to cell phone: [phone]  Will anyone else be joining your video visit? No      Video-Visit Details    Type of service:  Video Visit    Video Start Time: 11:27  Video End Time: 11:50    Originating Location (pt. Location): home    Distant Location (provider location):  " [facility] FOR LUNG SCIENCE [facility]     Platform used for Video Visit: Justyna          Again, thank you for allowing me to participate in the care of your patient.        Sincerely,        Ame Chow PA-C

## 2020-10-06 NOTE — PATIENT INSTRUCTIONS
Patient Instructions  1. Continue to hydrate with 60-70 oz fluids daily.  2. Continue to exercise daily or most days of the week.  3. Follow up with your primary care provider for annual gender health maintenance procedures.  4. Follow up with colonoscopy schedule.  5. Follow up with annual dermatology visits.  6. Start minocycline 100 mg twice daily for 14 days. We may repeat a chest x-ray at some point. Give us an update next week on your symptoms.   7. Mikayla will send orders to your lab for a sputum sample.   8. We need to check a posaconazole level about 10 days after starting the medication. This is a trough level, so hold the medication until after the lab draw. Mikayla will send orders to your lab for this as well to be done on 10/19.   9. Start Flonase daily.   10. Tessalon pearls as needed for cough.     Next transplant clinic appointment: 6 weeks with CXR, labs and PFTs  Next lab draw: week of 10/19

## 2020-10-06 NOTE — LETTER
PHYSICIAN ORDERS      DATE & TIME ISSUED: 2020 11:36 AM  PATIENT NAME: Kecia Blue   : 1960     [facility] MR# [if applicable]: [id number]     DIAGNOSIS:  Lung Transplant  Z94.2    Sputum culture aerobic bacteria  Gram stain  Fungus culture- sputum    Any questions please call: Mikayla     Please fax these results to [phone].        Ame Chow PA-C

## 2020-10-06 NOTE — TELEPHONE ENCOUNTER
Oncology/Surgical Oncology Referral Request:     Specialty Requested: Medical Oncology     Referring Provider: Krystle Salgado APRN CNS     Referring Clinic/Organization: [facility]    Records location: [facility]     Requested Provider (if specified): Not Specified

## 2020-10-06 NOTE — TELEPHONE ENCOUNTER
October 6, 2020 2:03 PM -  AIVERSE1: called pt to [facility] rtc appt w cherie 10/16 /vm

## 2020-10-06 NOTE — NURSING NOTE
Transplant Coordinator Note     Reason for visit: Post lung transplant follow up visit   Coordinator: Present - on phone  Caregiver: Ranjan,      Health concerns addressed today:  1. Vichy season.   2. Still has a cough that has become more frequent than last visit, not more productive (clear sputum). Bactrim didn't help. Sometimes feels congested. Also blows her nose every time she coughs, also clear. No clear trigger for coughing spells. Feels this is between her upper chest to her nose, not deep chest. SOB with exertion, walking outside for exercise or stairs.   3. /74 yesterday at dialysis. Oxygen sats 95% currently.   4. CMV pending from yesterday.   5. R lower lung opacity on x-ray from yesterday.   6.     Activity/rehab: up [place]  Oxygen needs: room air  Pain management/RX: Denies  High risk donor: Yes  CMV status: D+/R+  DVT/PE: H/o DVT  Post op AFIB/follow up with EP: One time occurrence of a-fib  PJP prophylactic: Dapsone     Pt Education: medications (use/dose/side effects), how/when to call coordinator, frequency of labs, s/s of infection/rejection, call prior to starting any new medications, lab/vital sign book     Health Maintenance:     Last colonoscopy:     Next colonoscopy due:     Dermatology:    Vaccinations this visit:      Labs, CXR, PFTs reviewed with patient  Medication record reviewed and reconciled  Questions and concerns addressed     Patient Instructions  1. Continue to hydrate with 60-70 oz fluids daily.  2. Continue to exercise daily or most days of the week.  3. Follow up with your primary care provider for annual gender health maintenance procedures.  4. Follow up with colonoscopy schedule.  5. Follow up with annual dermatology visits.  6. Start minocycline 100 mg twice daily for 14 days. We may repeat a chest x-ray at some point. Give us an update next week on your symptoms.   7. Mikayla will send orders to your lab for a sputum sample.   8. We need to check a  posaconazole level about 10 days after starting the medication. This is a trough level, so hold the medication until after the lab draw. Mikayla will send orders to your lab for this as well to be done on 10/19.   9. Start Flonase daily.   10. Tessalon pearls as needed for cough.     Next transplant clinic appointment: 6 weeks with CXR, labs and PFTs  Next lab draw: week of 10/19    AVS printed at time of check out

## 2020-10-06 NOTE — LETTER
PHYSICIAN ORDERS    DATE & TIME ISSUED: 2020 11:53 AM  PATIENT NAME: Kecia Blue   : 1960     [facility] MR# [if applicable]: [id number]     DIAGNOSIS:  Lung Transplant  Z94.2  UPDATED STANDING ORDERS      Item Frequency   X CBC with platelets and Diff   Every 2 weeks    X CMP Every 2 weeks   X Phosphorus Every 2 weeks   X Magnesium Every 2 weeks    X Tacrolimus/Prograf level  (Should be mailed to the [facility] in the  provided to you by the patient.) 12 hour trough level Every 2 weeks    X CMV DNA Quant   Every 2 weeks   X Posaconazole level Week of 10/19/20, then monthly     Any questions please call: Mikayla     Please fax these results to [phone].        Ame Chow PA-C

## 2020-10-06 NOTE — PROGRESS NOTES
"Kecia Blue is a 59 year old female who is being evaluated via a billable video visit. She has a history of bilateral lung transplant on 3/1/18 for ILD associated with connective tissue disease. 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      1. S/p bilateral lung transplant: ongoing frequent cough, increase over prior, intermittently productive, feels like it comes from her mid chest and upper airway. Denies acid reflux, notes sinus congestion, but unclear on PND. No tightness, dyspnea with activity is unchanged. Notes the Bactrim didn't help her; prior culture + for two strains P-S steno. Sating 95 % on room air. DSA 2/19/20 negative. CXR from OSH reviewed by me today demonstrates perihilar and basilar atelectasis; reviewed with radiology and they feel the RLL opacification is likely increased atelectasisl. No PFTs today. Will treat steno with minocycline and reassess in one week need for IV ceftaz.  - Start minocycline 100 mg BID X 14 days  - Repeat sputum bacterial and fungal if able  - Flonase 1 spray daily  - Tessalon perles TID PRN  - Continue two drug IS including tacrolimus (goal 80-10) and prednisone (for recurrent infections)   - PCP prophylaxis discontinued for CD4 count > 200 (7/28)     2. Right mainstem bronchial stenosis: s/p dilation, most recently 3/19.   - Monitor     3. Ascites, elevated alk phos and portal hypertension: initially thought to be related to chronic elevated right-sided heart pressures on liver biopsy with pre-lung transplant PA presssure of 98/45, mean 61 mm hg. However, right heart cath on 3/10/20 showed only mild PAH, with PAP 33/18, mean 24. Possibly related to \"residual\" disease\" from prior congestion with decompensation, at which time she develped ascites. Alk phos elevation likely secondary to her azole use, stable.   - Monitor LFTs  - Following with Hepatology, next 12/21     4. H/o left-sided aspergillus empyema: first noted " on 10/08/2019. CT scan repeated on 7/17/20 showed increased mass-like density, likely pleural-based, in left lower lung area s/p needle aspiration on 8/13/20, now + for aspergillus fumigatus. Unclear plan for mass currently. Is on voriconazole with plan to change to posaconazole when it arrives. Seen ID yesterday with plan to follow.  - Continue voriconazole until posaconazole arrives  - Posaconazole level 7-10 days after initiation  - LFTs monitoring per above  - Virtual appt with Dr. Marvin tomorrow     5. H/o CMV viremia: last CMV on 9/21 negative.   - Pending for today     6. H/o EBV viremia: last level of 2935 on 9/9/20.  - EBV at next f/u     7. Dialysis-dependent renal failure: changed from 3 times/week HD to to 2 times/week HD on M/Th. Will determine plan for renal transplant work up once we have a plan for the aspergillus in place.      8. HTN: controlled.  - Continue amlodipine and carvedilol     RTC: 6 weeks  Influenza and other vaccinations: influenza at dialysis on Thursday per Kecia  Annual dermatology visit:     Ame Chow PA-C  Pulmonary, Allergy, Critical Care and Sleep Medicine    Interval history: patient is feeling okay, but cannot quit coughing. Bactrim didn't help. Cough is more frequent than a month ago, not more productive. Mucous is clear, does have some sinus and chest congestion, the cough is near continuous. Shortness of breath with stairs or activity outside, not changed from her baseline, no tightness. Doesn't seem to be triggered by anything specific. No fever or chills, feels like it is between her upper chest and nose. No running hose or PND. No chest pain or pain with coughing. No palpitations. No GI complaints.     Vitals: /74, O2 sat of 95%    GENERAL: Healthy, alert and no distress  EYES: Eyes grossly normal to inspection.  No discharge or erythema, or obvious scleral/conjunctival abnormalities  RESP: No audible wheeze, cough, or visible cyanosis.  No visible retractions or  "increased work of breathing  SKIN: Visible skin clear. No significant rash, abnormal pigmentation or lesions  NEURO: Cranial nerves grossly intact.  Mentation and speech appropriate for age  PSYCH: Mentation appears normal, affect normal/bright, judgement and insight intact, normal speech and appearance well-groomed    The patient has been notified of following:     \"This video visit will be conducted via a call between you and your physician/provider. We have found that certain health care needs can be provided without the need for an in-person physical exam.  This service lets us provide the care you need with a video conversation.  If a prescription is necessary we can send it directly to your pharmacy.  If lab work is needed we can place an order for that and you can then stop by our lab to have the test done at a later time.    Video visits are billed at different rates depending on your insurance coverage.  Please reach out to your insurance provider with any questions.    If during the course of the call the physician/provider feels a video visit is not appropriate, you will not be charged for this service.\"    Patient has given verbal consent for Video visit? Yes  How would you like to obtain your AVS? MyChart  If you are dropped from the video visit, the video invite should be resent to: Text to cell phone: [phone]  Will anyone else be joining your video visit? No      Video-Visit Details    Type of service:  Video Visit    Video Start Time: 11:27  Video End Time: 11:50    Originating Location (pt. Location): home    Distant Location (provider location):  [facility] FOR LUNG SCIENCE [facility]     Platform used for Video Visit: Justyna"

## 2020-10-06 NOTE — LETTER
PHYSICIAN ORDERS    DATE & TIME ISSUED: 2020 11:53 AM  PATIENT NAME: Kecia Blue   : 1960     [facility] MR# [if applicable]: [id number]     DIAGNOSIS:  Lung Transplant  Z94.2  UPDATED STANDING ORDERS      Item Frequency   X CBC with platelets and Diff   Every 2 weeks    X CMP Every 2 weeks   X Phosphorus Every 2 weeks   X Magnesium Every 2 weeks    X Tacrolimus/Prograf level  (Should be mailed to the [facility] in the  provided to you by the patient.) 12 hour trough level Every 2 weeks    X CMV DNA Quant   Every 2 weeks   X Posaconazole level Week of 10/19/20, then monthly     Any questions please call: Mikayla     Please fax these results to [phone].        Ame Chow PA-C

## 2020-10-07 ENCOUNTER — TELEPHONE (OUTPATIENT)
Dept: TRANSPLANT | Facility: CLINIC | Age: 60
End: 2020-10-07

## 2020-10-07 ENCOUNTER — TELEPHONE (OUTPATIENT)
Dept: PULMONOLOGY | Facility: CLINIC | Age: 60
End: 2020-10-07

## 2020-10-07 ENCOUNTER — PRE VISIT (OUTPATIENT)
Dept: SURGERY | Facility: CLINIC | Age: 60
End: 2020-10-07

## 2020-10-07 ENCOUNTER — VIRTUAL VISIT (OUTPATIENT)
Dept: SURGERY | Facility: CLINIC | Age: 60
End: 2020-10-07
Attending: CLINICAL NURSE SPECIALIST
Payer: COMMERCIAL

## 2020-10-07 DIAGNOSIS — J86.9 EMPYEMA OF LUNG (H): ICD-10-CM

## 2020-10-07 DIAGNOSIS — J84.9 ILD (INTERSTITIAL LUNG DISEASE) (H): ICD-10-CM

## 2020-10-07 DIAGNOSIS — Z94.2 S/P LUNG TRANSPLANT (H): ICD-10-CM

## 2020-10-07 LAB
FUNGUS SPEC CULT: NORMAL
SPECIMEN SOURCE: NORMAL
TACROLIMUS BLD-MCNC: 11.1 UG/L (ref 5–15)
TME LAST DOSE: NORMAL H

## 2020-10-07 PROCEDURE — 99203 OFFICE O/P NEW LOW 30 MIN: CPT | Mod: 95 | Performed by: THORACIC SURGERY (CARDIOTHORACIC VASCULAR SURGERY)

## 2020-10-07 PROCEDURE — 999N001193 HC VIDEO/TELEPHONE VISIT; NO CHARGE

## 2020-10-07 NOTE — TELEPHONE ENCOUNTER
Central Prior Authorization Team   [phone]    PA Initiation    Medication: Minocycline XSI493 mg   Insurance Company: [employer] - Phone [phone] Fax [phone]  Pharmacy Filling the Rx: [facility] [id number] [place] [address]  Filling Pharmacy Phone: [phone]  Filling Pharmacy Fax: [phone]  Start Date: 10/7/2020

## 2020-10-07 NOTE — PROGRESS NOTES
"Kecia Blue is a 59 year old female who is being evaluated via a billable video visit.      The patient has been notified of following:     \"This video visit will be conducted via a call between you and your physician/provider. We have found that certain health care needs can be provided without the need for an in-person physical exam.  This service lets us provide the care you need with a video conversation.  If a prescription is necessary we can send it directly to your pharmacy.  If lab work is needed we can place an order for that and you can then stop by our lab to have the test done at a later time.    Video visits are billed at different rates depending on your insurance coverage.  Please reach out to your insurance provider with any questions.    If during the course of the call the physician/provider feels a video visit is not appropriate, you will not be charged for this service.\"    Patient has given verbal consent for Video visit? Yes  How would you like to obtain your AVS? MyChart  If you are dropped from the video visit, the video invite should be resent to: Text to cell phone: [phone]   Will anyone else be joining your video visit? No  {If patient encounters technical issues they should call [phone] :[id number]}     Vitals - Patient Reported  Weight (Patient Reported): 60.8 kg (134 lb)  Height (Patient Reported): 154.9 cm (5' 1\")  BMI (Based on Pt Reported Ht/Wt): 25.32  Pain Score: No Pain (0)      I have reviewed and updated the patient's allergies and medication list.    Antoinette Jimenez CMA          Video-Visit Details    Type of service:  Video Visit    Video Start Time: {video visit start/end time:152948}  Video End Time: {video visit start/end time:152948}    Originating Location (pt. Location): {video visit patient location:254380::\"Home\"}    Distant Location (provider location):  [facility]     Platform used for Video Visit: {Virtual Visit " "Platforms:413745::\"[facility]\"}    {signature options:083475}            THORACIC SURGERY - NEW PATIENT OFFICE VISIT      Dear Dr. Mcelroy,    I saw Ms. Blue in consultation for the evaluation and treatment of a persistent left posterior sulcus     HPI  M***. Kecia Blue is a 59 year old year-old female ***    Previsit Tests   ***  PMH  ***    Past Medical History:   Diagnosis Date     Abdominal pain 10/6/2019     Acute on chronic respiratory failure with hypoxia (H) 2/21/2018     Antisynthetase syndrome (H) 2014     Chronic cough      Chronic infection     recent C diff  8/18     Dehydration 8/1/2018     Dehydration 8/1/2018     Dermatomyositis (H)      Dysplasia of cervix, low grade (ESTRADA 1)      ILD (interstitial lung disease) (H)      Nausea and vomiting 12/4/2018     Osteopenia      PONV (postoperative nausea and vomiting)      Pulmonary hypertension (H)      Raynaud's disease      Seronegative rheumatoid arthritis (H)         PSH  ***    Past Surgical History:   Procedure Laterality Date     BRONCHOSCOPY (RIGID OR FLEXIBLE), DIAGNOSTIC N/A 4/10/2018    Procedure: COMBINED BRONCHOSCOPY (RIGID OR FLEXIBLE), LAVAGE;;  Surgeon: Mariposa Donohue MD;  [facility]     BRONCHOSCOPY (RIGID OR FLEXIBLE), DILATE BRONCHUS / TRACHEA N/A 10/11/2018    Procedure: BRONCHOSCOPY (RIGID OR FLEXIBLE), DILATE BRONCHUS / TRACHEA;  Flexible And Rigid Bronchoscopy and Dilation;  Surgeon: Wilber Lin MD;  [facility]     BRONCHOSCOPY FLEXIBLE N/A 3/13/2018    Procedure: BRONCHOSCOPY FLEXIBLE;  Flexible Bronchoscopy ;  Surgeon: Gissell Sanchez MD;  [facility]     BRONCHOSCOPY FLEXIBLE N/A 5/9/2018    Procedure: BRONCHOSCOPY FLEXIBLE;;  Surgeon: Wilber Lin MD;  [facility]     BRONCHOSCOPY FLEXIBLE AND RIGID N/A 9/10/2018    Procedure: BRONCHOSCOPY FLEXIBLE AND RIGID;  Flexible and Rigid Bronchoscopy with Balloon Dilation, tissue debulking with cryo, and Right mainstem bronchus stent " placement;  Surgeon: Wilber iLn MD;  [facility]     BRONCHOSCOPY RIGID N/A 6/6/2018    Procedure: BRONCHOSCOPY RIGID;;  Surgeon: Lopez Macias MD;  [facility]     BRONCHOSCOPY, DILATE BRONCHUS, STENT BRONCHUS, COMBINED N/A 6/11/2018    Procedure: COMBINED BRONCHOSCOPY, DILATE BRONCHUS, STENT BRONCHUS;  Flexible Bronchoscopy, Balloon Dilation, Bronchial Washings;  Surgeon: Wilber Lin MD;  [facility]     BRONCHOSCOPY, DILATE BRONCHUS, STENT BRONCHUS, COMBINED Right 7/10/2018    Procedure: COMBINED BRONCHOSCOPY, DILATE BRONCHUS, STENT BRONCHUS;  Flexible Bronchoscopy, Balloon Dilation, Bronchial Washings  ;  Surgeon: Wilber Lin MD;  [facility]     BRONCHOSCOPY, DILATE BRONCHUS, STENT BRONCHUS, COMBINED N/A 8/2/2018    Procedure: COMBINED BRONCHOSCOPY, DILATE BRONCHUS, STENT BRONCHUS;  Flexible Bronchoscopy, Bronchial Washings, Balloon Dilation;  Surgeon: Wilber Lin MD;  [facility]     BRONCHOSCOPY, DILATE BRONCHUS, STENT BRONCHUS, COMBINED N/A 8/20/2018    Procedure: COMBINED BRONCHOSCOPY, DILATE BRONCHUS, STENT BRONCHUS;  Flexible Bronchoscopy, Balloon Dilation;  Surgeon: Wilber Lin MD;  [facility]     BRONCHOSCOPY, DILATE BRONCHUS, STENT BRONCHUS, COMBINED N/A 10/29/2018    Procedure: Flexible Bronchoscopy, Balloon Dilation, Stent Revision, Airway Examination And Therapeutic Suctioning, Cyro Tumor Debulking;  Surgeon: Wilber Lin MD;  [facility]     BRONCHOSCOPY, DILATE BRONCHUS, STENT BRONCHUS, COMBINED N/A 11/20/2018    Procedure: Rigid Bronchoscopy, Stent Removal and dilitation;  Surgeon: Wilber Lin MD;  [facility]     BRONCHOSCOPY, DILATE BRONCHUS, STENT BRONCHUS, COMBINED N/A 12/14/2018    Procedure: Flexible And Rigid Bronchoscopy, Balloon Dilation, Bronchial Washing;  Surgeon: Wilber Lin MD;  [facility]     BRONCHOSCOPY, DILATE BRONCHUS, STENT BRONCHUS, COMBINED  N/A 1/17/2019    Procedure: Flexible And Rigid Bronchoscopy, Balloon Dilation.;  Surgeon: Wilber Lin MD;  [facility]     BRONCHOSCOPY, DILATE BRONCHUS, STENT BRONCHUS, COMBINED N/A 3/7/2019    Procedure: Flexible and Rigid Bronchoscopy, Bronchial Washing, Balloon Dilation;  Surgeon: Wilber Lin MD;  [facility]     BRONCHOSCOPY, DILATE BRONCHUS, STENT BRONCHUS, COMBINED N/A 6/6/2019    Procedure: Rigid and Flexible Bronchoscopy, Balloon Dilation;  Surgeon: Wilber Lin MD;  [facility]     BRONCHOSCOPY, DILATE BRONCHUS, STENT BRONCHUS, COMBINED N/A 10/11/2019    Procedure: Flexible and Rigid Bronchoscopy, Balloon Dilation, Bronchoalveolar Lagave;  Surgeon: Wilber Lin MD;  [facility]     CV RIGHT HEART CATH N/A 3/10/2020    Procedure: CV RIGHT HEART CATH;  Surgeon: Wai Garcia MD;  Location:  HEART CARDIAC CATH LAB     ENT SURGERY      tonsillectomy as a child     ESOPHAGOSCOPY, GASTROSCOPY, DUODENOSCOPY (EGD), COMBINED N/A 10/29/2018    Procedure: COMBINED ESOPHAGOSCOPY, GASTROSCOPY, DUODENOSCOPY (EGD) with biopsies and polypectomy;  Surgeon: Chente Bloom MD;  [facility]     INSERT EXTRACORPORAL MEMBRANE OXYGENATOR ADULT (OUTSIDE OR) N/A 2/27/2018    Procedure: INSERT EXTRACORPORAL MEMBRANE OXYGENATOR ADULT (OUTSIDE OR);  INSERT EXTRACORPORAL MEMBRANE OXYGENATOR ADULT (OUTSIDE OR) ;  Surgeon: Toby Hernandez MD;  [facility]     IR CVC TUNNEL PLACEMENT > 5 YRS OF AGE  10/25/2019     IR PARACENTESIS  1/8/2020     IR THORACENTESIS  9/13/2019     IR TRANSCATHETER BIOPSY  1/8/2020     no prior surgery       REMOVE EXTRACORPORAL MEMBRANE OXYGENATOR ADULT N/A 3/3/2018    Procedure: REMOVE EXTRACORPORAL MEMBRANE OXYGENATOR ADULT;  Removal of Right Femoral Venous and Right Axillary Arterial Extracorporeal Membrane Oxygenator;  Surgeon: Toby Hernandez MD;  [facility]     TRANSPLANT LUNG RECIPIENT SINGLE  X2 Bilateral 3/1/2018    Procedure: TRANSPLANT LUNG RECIPIENT SINGLE X2;  Median Sternotomy, Extracorporeal Membrane Oxygenator, bilateral sequential lung transplant;  Surgeon: Toby Hernandez MD;  [facility]        [facility]***  TOB***    Physical examination  BMI ***  ***    From a personal perspective, ***    IMPRESSION No diagnosis found.   59 year old year-old female with ***    PLAN  I spent a total of *** minutes with Ms. Blue and ***, more than 50% of which were spent in counseling, coordination of care, and face-to-face time. I reviewed the plan as follows:  Procedure planned: ***.  Consent: ***  Necessary Preop Tests & Appointments: ***  Regional Anesthesia Plan: ***  Anticoagulation Plan: ***  Smoking Cessation: ***  Ms. Blue was counseled on the importance of smoking cessation and its impact on the georges-operative course. {tscessation:744366} This guideline is in accordance with the World Health Organization (WHO) and has shown to lower the risk of both surgical and anesthesia complications as well as improve post-operative outcomes.     All questions were answered and Kecia Blue and present family were in agreement with the plan.  I appreciate the opportunity to participate in the care of your patient and will keep you updated.  Sincerely,

## 2020-10-07 NOTE — TELEPHONE ENCOUNTER
"Patient calls to say she got \"booted out\" of her video encounter with Dr. Marvin this afternoon and was not able to get ahold of anyone in his office. Told patient coordinator will call clinic and Instructed patient to wait for phone call of link for video visit.   Patient verbalized understanding of plan.   [facility] clinic called, was notified that patient can be reached via phone for appointment.   "

## 2020-10-07 NOTE — LETTER
"    10/7/2020         RE: Kecia Blue  [address] [place]  [place] [address]        Dear Colleague,    Thank you for referring your patient, Kecia Blue, to the [facility]. Please see a copy of my visit note below.    Kecia Blue is a 59 year old female who is being evaluated via a billable video visit.      The patient has been notified of following:     \"This video visit will be conducted via a call between you and your physician/provider. We have found that certain health care needs can be provided without the need for an in-person physical exam.  This service lets us provide the care you need with a video conversation.  If a prescription is necessary we can send it directly to your pharmacy.  If lab work is needed we can place an order for that and you can then stop by our lab to have the test done at a later time.    Video visits are billed at different rates depending on your insurance coverage.  Please reach out to your insurance provider with any questions.    If during the course of the call the physician/provider feels a video visit is not appropriate, you will not be charged for this service.\"    Patient has given verbal consent for Video visit? Yes  How would you like to obtain your AVS? MyChart  If you are dropped from the video visit, the video invite should be resent to: Text to cell phone: [phone]   Will anyone else be joining your video visit? No       Vitals - Patient Reported  Weight (Patient Reported): 60.8 kg (134 lb)  Height (Patient Reported): 154.9 cm (5' 1\")  BMI (Based on Pt Reported Ht/Wt): 25.32  Pain Score: No Pain (0)      I have reviewed and updated the patient's allergies and medication list.    Antoinette Jimenez CMA          Video-Visit Details    Type of service:  Video Visit    Video Start Time: 4:41 pm  Video End Time: 5:00 pm    Originating Location (pt. Location): Home    Distant Location (provider location):  M " [facility]     Platform used for Video Visit: Shaheen Lao MD      THORACIC SURGERY - NEW PATIENT OFFICE VISIT      Dear Dr. Mcelroy,    I saw Ms. Blue in consultation for the evaluation and treatment of a persistent left Aspergillus empyema    HPI  Ms. Kecia Blue is a 59 year-old female patient with history of bilateral lung transplant for ILD on 3/1/18. She developed a loculated left pleural effusion in her posterior sulcus since last October 2019. A needle aspiration 8/13/20 showed Aspergillus fumigatus. She has been on antifungals for almost an entire year without complete resolution. She is being referred for surgical management.      Previsit Tests   PFT 9/9/20: FEV1 42%.   CT scan 8/13/20 (needle aspiration): Left posterior sulcus phlegmon.            PMH  Dialysis dependent renal failure  Liver dysfunction, ascites and portal hypertension.     Past Medical History:   Diagnosis Date     Abdominal pain 10/6/2019     Acute on chronic respiratory failure with hypoxia (H) 2/21/2018     Antisynthetase syndrome (H) 2014     Chronic cough      Chronic infection     recent C diff  8/18     Dehydration 8/1/2018     Dehydration 8/1/2018     Dermatomyositis (H)      Dysplasia of cervix, low grade (ESTRADA 1)      ILD (interstitial lung disease) (H)      Nausea and vomiting 12/4/2018     Osteopenia      PONV (postoperative nausea and vomiting)      Pulmonary hypertension (H)      Raynaud's disease      Seronegative rheumatoid arthritis (H)         PSH    Past Surgical History:   Procedure Laterality Date     BRONCHOSCOPY (RIGID OR FLEXIBLE), DIAGNOSTIC N/A 4/10/2018    Procedure: COMBINED BRONCHOSCOPY (RIGID OR FLEXIBLE), LAVAGE;;  Surgeon: Mariposa Donohue MD;  Location:  GI     BRONCHOSCOPY (RIGID OR FLEXIBLE), DILATE BRONCHUS / TRACHEA N/A 10/11/2018    Procedure: BRONCHOSCOPY (RIGID OR FLEXIBLE), DILATE BRONCHUS / TRACHEA;  Flexible And Rigid Bronchoscopy and  Dilation;  Surgeon: Wilber Lin MD;  [facility]     BRONCHOSCOPY FLEXIBLE N/A 3/13/2018    Procedure: BRONCHOSCOPY FLEXIBLE;  Flexible Bronchoscopy ;  Surgeon: Gissell Sanchez MD;  [facility]     BRONCHOSCOPY FLEXIBLE N/A 5/9/2018    Procedure: BRONCHOSCOPY FLEXIBLE;;  Surgeon: Wilber Lin MD;  [facility]     BRONCHOSCOPY FLEXIBLE AND RIGID N/A 9/10/2018    Procedure: BRONCHOSCOPY FLEXIBLE AND RIGID;  Flexible and Rigid Bronchoscopy with Balloon Dilation, tissue debulking with cryo, and Right mainstem bronchus stent placement;  Surgeon: Wilber Lin MD;  [facility]     BRONCHOSCOPY RIGID N/A 6/6/2018    Procedure: BRONCHOSCOPY RIGID;;  Surgeon: Lopez Macias MD;  [facility]     BRONCHOSCOPY, DILATE BRONCHUS, STENT BRONCHUS, COMBINED N/A 6/11/2018    Procedure: COMBINED BRONCHOSCOPY, DILATE BRONCHUS, STENT BRONCHUS;  Flexible Bronchoscopy, Balloon Dilation, Bronchial Washings;  Surgeon: Wilber Lin MD;  [facility]     BRONCHOSCOPY, DILATE BRONCHUS, STENT BRONCHUS, COMBINED Right 7/10/2018    Procedure: COMBINED BRONCHOSCOPY, DILATE BRONCHUS, STENT BRONCHUS;  Flexible Bronchoscopy, Balloon Dilation, Bronchial Washings  ;  Surgeon: Wilber Lin MD;  [facility]     BRONCHOSCOPY, DILATE BRONCHUS, STENT BRONCHUS, COMBINED N/A 8/2/2018    Procedure: COMBINED BRONCHOSCOPY, DILATE BRONCHUS, STENT BRONCHUS;  Flexible Bronchoscopy, Bronchial Washings, Balloon Dilation;  Surgeon: Wilber Lin MD;  [facility]     BRONCHOSCOPY, DILATE BRONCHUS, STENT BRONCHUS, COMBINED N/A 8/20/2018    Procedure: COMBINED BRONCHOSCOPY, DILATE BRONCHUS, STENT BRONCHUS;  Flexible Bronchoscopy, Balloon Dilation;  Surgeon: Wilber Lin MD;  [facility]     BRONCHOSCOPY, DILATE BRONCHUS, STENT BRONCHUS, COMBINED N/A 10/29/2018    Procedure: Flexible Bronchoscopy, Balloon Dilation, Stent Revision, Airway Examination And  Therapeutic Suctioning, Cyro Tumor Debulking;  Surgeon: Wilber Lin MD;  [facility]     BRONCHOSCOPY, DILATE BRONCHUS, STENT BRONCHUS, COMBINED N/A 11/20/2018    Procedure: Rigid Bronchoscopy, Stent Removal and dilitation;  Surgeon: Wilber Lin MD;  [facility]     BRONCHOSCOPY, DILATE BRONCHUS, STENT BRONCHUS, COMBINED N/A 12/14/2018    Procedure: Flexible And Rigid Bronchoscopy, Balloon Dilation, Bronchial Washing;  Surgeon: Wilber Lin MD;  [facility]     BRONCHOSCOPY, DILATE BRONCHUS, STENT BRONCHUS, COMBINED N/A 1/17/2019    Procedure: Flexible And Rigid Bronchoscopy, Balloon Dilation.;  Surgeon: Wilber Lin MD;  [facility]     BRONCHOSCOPY, DILATE BRONCHUS, STENT BRONCHUS, COMBINED N/A 3/7/2019    Procedure: Flexible and Rigid Bronchoscopy, Bronchial Washing, Balloon Dilation;  Surgeon: Wilber Lin MD;  [facility]     BRONCHOSCOPY, DILATE BRONCHUS, STENT BRONCHUS, COMBINED N/A 6/6/2019    Procedure: Rigid and Flexible Bronchoscopy, Balloon Dilation;  Surgeon: Wilber Lin MD;  [facility]     BRONCHOSCOPY, DILATE BRONCHUS, STENT BRONCHUS, COMBINED N/A 10/11/2019    Procedure: Flexible and Rigid Bronchoscopy, Balloon Dilation, Bronchoalveolar Lagave;  Surgeon: Wilber Lin MD;  [facility]     CV RIGHT HEART CATH N/A 3/10/2020    Procedure: CV RIGHT HEART CATH;  Surgeon: Wai Garcia MD;  Location:  HEART CARDIAC CATH LAB     ENT SURGERY      tonsillectomy as a child     ESOPHAGOSCOPY, GASTROSCOPY, DUODENOSCOPY (EGD), COMBINED N/A 10/29/2018    Procedure: COMBINED ESOPHAGOSCOPY, GASTROSCOPY, DUODENOSCOPY (EGD) with biopsies and polypectomy;  Surgeon: Chente Bloom MD;  [facility]     INSERT EXTRACORPORAL MEMBRANE OXYGENATOR ADULT (OUTSIDE OR) N/A 2/27/2018    Procedure: INSERT EXTRACORPORAL MEMBRANE OXYGENATOR ADULT (OUTSIDE OR);  INSERT EXTRACORPORAL MEMBRANE OXYGENATOR ADULT  (OUTSIDE OR) ;  Surgeon: Toby Hernandez MD;  [facility]     IR CVC TUNNEL PLACEMENT > 5 YRS OF AGE  10/25/2019     IR PARACENTESIS  1/8/2020     IR THORACENTESIS  9/13/2019     IR TRANSCATHETER BIOPSY  1/8/2020     no prior surgery       REMOVE EXTRACORPORAL MEMBRANE OXYGENATOR ADULT N/A 3/3/2018    Procedure: REMOVE EXTRACORPORAL MEMBRANE OXYGENATOR ADULT;  Removal of Right Femoral Venous and Right Axillary Arterial Extracorporeal Membrane Oxygenator;  Surgeon: Toby Hernandez MD;  [facility]     TRANSPLANT LUNG RECIPIENT SINGLE X2 Bilateral 3/1/2018    Procedure: TRANSPLANT LUNG RECIPIENT SINGLE X2;  Median Sternotomy, Extracorporeal Membrane Oxygenator, bilateral sequential lung transplant;  Surgeon: Toby Hernandez MD;  [facility]       No Known Allergies  Current Outpatient Medications   Medication     albuterol (PROAIR HFA/PROVENTIL HFA/VENTOLIN HFA) 108 (90 Base) MCG/ACT inhaler     amLODIPine (NORVASC) 5 MG tablet     B Complex-C-Folic Acid (DIALYVITE) TABS     benzonatate (TESSALON) 100 MG capsule     calcium-vitamin D (CALTRATE) 600-400 MG-UNIT per tablet     carvedilol (COREG) 25 MG tablet     ferrous sulfate (FEROSUL) 325 (65 Fe) MG tablet     fluticasone (FLONASE) 50 MCG/ACT nasal spray     magnesium oxide (MAG-OX) 400 MG tablet     multivitamin, therapeutic with minerals (THERA-VIT-M) TABS tablet     posaconazole (NOXAFIL) 100 MG EC tablet     predniSONE 2.5 MG PO tablet     pantoprazole (PROTONIX) 40 MG EC tablet     tacrolimus (GENERIC EQUIVALENT) 0.5 MG capsule     tacrolimus (GENERIC EQUIVALENT) 1 MG capsule     No current facility-administered medications for this visit.        ETOH None  TOB Non-smoker    Physical examination  BMI 24. Otherwise deferred (virtual visit)    From a personal perspective, she is at home with her family.     IMPRESSION No diagnosis found.   59 year old female patient with history of bilateral lung transplant now with  persistent left Aspergillus empyema. I had an extensive discussion with Ms Blue regarding her condition and treatment options. I explained that given her immunosuppression, renal and liver dysfunction, a lung resection or even a pulmonary decortication would represent a very high risk for her. Another option would be to perform an open cavernostomy however, this would represent significant morbidity. I discussed her case with Interventional Radiology to entertain the possibility of delivering antifungal-impregnated beads into the left pleural space and watch her evolution. She understands that this is an off-label use however, she has no other less invasive options at this time. If this treatment fails, we still have the option of an open cavernostomy in the future.     PLAN   I reviewed the plan as follows:  I will discuss her case with Dr Mcelroy (transplant pulmonologist), Dr Layton (transplant ID) and Dr Meléndez (Interventional Radiology), to establish the best treatment plan for her and decide if pleural instillation of antibiotic beads would be in her best interest.   She would not be a good surgical candidate for a lung resection or pulmonary decortication at this time.     All questions were answered and Kecia Blue and present family were in agreement with the plan.  I appreciate the opportunity to participate in the care of your patient and will keep you updated.  Sincerely,    Caryn Lao MD

## 2020-10-07 NOTE — TELEPHONE ENCOUNTER
Prior Authorization Retail Medication Request    Medication/Dose: Minocycline AXP906 mg   ICD code (if different than what is on RX):    Previously Tried and Failed:    Rationale:      Insurance Name:    Insurance ID:        Pharmacy Information (if different than what is on RX)  Name:  [facility]  Phone:  [phone]

## 2020-10-08 ENCOUNTER — TELEPHONE (OUTPATIENT)
Dept: TRANSPLANT | Facility: CLINIC | Age: 60
End: 2020-10-08

## 2020-10-08 DIAGNOSIS — Z94.2 S/P LUNG TRANSPLANT (H): ICD-10-CM

## 2020-10-08 DIAGNOSIS — Z94.2 LUNG REPLACED BY TRANSPLANT (H): ICD-10-CM

## 2020-10-08 DIAGNOSIS — E86.0 DEHYDRATION: Primary | ICD-10-CM

## 2020-10-08 RX ORDER — TACROLIMUS 1 MG/1
1 CAPSULE ORAL EVERY MORNING
Qty: 30 CAPSULE | Refills: 11 | Status: SHIPPED | OUTPATIENT
Start: 2020-10-08 | End: 2021-01-05

## 2020-10-08 RX ORDER — MINOCYCLINE HYDROCHLORIDE 100 MG/1
100 CAPSULE ORAL 2 TIMES DAILY
Qty: 28 CAPSULE | Refills: 0 | Status: SHIPPED | OUTPATIENT
Start: 2020-10-08 | End: 2020-10-22

## 2020-10-08 RX ORDER — TACROLIMUS 0.5 MG/1
0.5 CAPSULE ORAL EVERY EVENING
Qty: 30 CAPSULE | Refills: 11 | Status: SHIPPED | OUTPATIENT
Start: 2020-10-08 | End: 2021-01-05

## 2020-10-08 RX ORDER — MINOCYCLINE HYDROCHLORIDE 100 MG/1
100 CAPSULE ORAL 2 TIMES DAILY
Qty: 14 CAPSULE | Refills: 0 | Status: SHIPPED | OUTPATIENT
Start: 2020-10-08 | End: 2020-10-08

## 2020-10-08 NOTE — TELEPHONE ENCOUNTER
PRIOR AUTHORIZATION DENIED    Medication: Minocycline TXQ248 mg-PA DENIED     Denial Date: 10/7/2020    Denial Rational: Criteria Not Met. Insurance states that patient must tried/failed generic Minocycline IR Capsules.        Appeal Information: n/a

## 2020-10-08 NOTE — TELEPHONE ENCOUNTER
Tacrolimus level 11.1 at 12 hours, on 10/5/20  Goal 8-10.   Current dose 1 mg in AM, 1 mg in PM    Dose changed to 1 mg in AM, 0.5 mg in PM   Recheck level in 7-14 days    Discussed with patient.    edo message sent.

## 2020-10-09 DIAGNOSIS — Z94.2 LUNG TRANSPLANT RECIPIENT (H): ICD-10-CM

## 2020-10-09 DIAGNOSIS — J84.9 ILD (INTERSTITIAL LUNG DISEASE) (H): ICD-10-CM

## 2020-10-09 DIAGNOSIS — K21.9 GASTROESOPHAGEAL REFLUX DISEASE WITHOUT ESOPHAGITIS: ICD-10-CM

## 2020-10-09 RX ORDER — PANTOPRAZOLE SODIUM 40 MG/1
40 TABLET, DELAYED RELEASE ORAL DAILY
Qty: 30 TABLET | Refills: 11 | Status: ON HOLD | OUTPATIENT
Start: 2020-10-09 | End: 2021-02-25

## 2020-10-09 NOTE — PROGRESS NOTES
"Kecia Blue is a 59 year old female who is being evaluated via a billable video visit.      The patient has been notified of following:     \"This video visit will be conducted via a call between you and your physician/provider. We have found that certain health care needs can be provided without the need for an in-person physical exam.  This service lets us provide the care you need with a video conversation.  If a prescription is necessary we can send it directly to your pharmacy.  If lab work is needed we can place an order for that and you can then stop by our lab to have the test done at a later time.    Video visits are billed at different rates depending on your insurance coverage.  Please reach out to your insurance provider with any questions.    If during the course of the call the physician/provider feels a video visit is not appropriate, you will not be charged for this service.\"    Patient has given verbal consent for Video visit? Yes  How would you like to obtain your AVS? MyChart  If you are dropped from the video visit, the video invite should be resent to: Text to cell phone: [phone]   Will anyone else be joining your video visit? No       Vitals - Patient Reported  Weight (Patient Reported): 60.8 kg (134 lb)  Height (Patient Reported): 154.9 cm (5' 1\")  BMI (Based on Pt Reported Ht/Wt): 25.32  Pain Score: No Pain (0)      I have reviewed and updated the patient's allergies and medication list.    Antoinette Jimenez CMA          Video-Visit Details    Type of service:  Video Visit    Video Start Time: 4:41 pm  Video End Time: 5:00 pm    Originating Location (pt. Location): Home    Distant Location (provider location):  [facility]     Platform used for Video Visit: Shaheen Lao MD      THORACIC SURGERY - NEW PATIENT OFFICE VISIT      Dear Dr. Mcelroy,    I saw Ms. Blue in consultation for the evaluation and treatment of a persistent left " Aspergillus empyema    HPI  Ms. Kecia Blue is a 59 year-old female patient with history of bilateral lung transplant for ILD on 3/1/18. She developed a loculated left pleural effusion in her posterior sulcus since last October 2019. A needle aspiration 8/13/20 showed Aspergillus fumigatus. She has been on antifungals for almost an entire year without complete resolution. She is being referred for surgical management.      Previsit Tests   PFT 9/9/20: FEV1 42%.   CT scan 8/13/20 (needle aspiration): Left posterior sulcus phlegmon.            PMH  Dialysis dependent renal failure  Liver dysfunction, ascites and portal hypertension.     Past Medical History:   Diagnosis Date     Abdominal pain 10/6/2019     Acute on chronic respiratory failure with hypoxia (H) 2/21/2018     Antisynthetase syndrome (H) 2014     Chronic cough      Chronic infection     recent C diff  8/18     Dehydration 8/1/2018     Dehydration 8/1/2018     Dermatomyositis (H)      Dysplasia of cervix, low grade (ESTRADA 1)      ILD (interstitial lung disease) (H)      Nausea and vomiting 12/4/2018     Osteopenia      PONV (postoperative nausea and vomiting)      Pulmonary hypertension (H)      Raynaud's disease      Seronegative rheumatoid arthritis (H)         PSH    Past Surgical History:   Procedure Laterality Date     BRONCHOSCOPY (RIGID OR FLEXIBLE), DIAGNOSTIC N/A 4/10/2018    Procedure: COMBINED BRONCHOSCOPY (RIGID OR FLEXIBLE), LAVAGE;;  Surgeon: Mariposa Donohue MD;  Location: [facility]     BRONCHOSCOPY (RIGID OR FLEXIBLE), DILATE BRONCHUS / TRACHEA N/A 10/11/2018    Procedure: BRONCHOSCOPY (RIGID OR FLEXIBLE), DILATE BRONCHUS / TRACHEA;  Flexible And Rigid Bronchoscopy and Dilation;  Surgeon: Wilber Lin MD;  [facility]     BRONCHOSCOPY FLEXIBLE N/A 3/13/2018    Procedure: BRONCHOSCOPY FLEXIBLE;  Flexible Bronchoscopy ;  Surgeon: Gissell Sanchez MD;  Location: [facility]     BRONCHOSCOPY FLEXIBLE N/A 5/9/2018    Procedure:  BRONCHOSCOPY FLEXIBLE;;  Surgeon: Wilber Lin MD;  Location: [facility]     BRONCHOSCOPY FLEXIBLE AND RIGID N/A 9/10/2018    Procedure: BRONCHOSCOPY FLEXIBLE AND RIGID;  Flexible and Rigid Bronchoscopy with Balloon Dilation, tissue debulking with cryo, and Right mainstem bronchus stent placement;  Surgeon: Wilber Lin MD;  [facility]     BRONCHOSCOPY RIGID N/A 6/6/2018    Procedure: BRONCHOSCOPY RIGID;;  Surgeon: Lopez Macias MD;  [facility]: [facility]     BRONCHOSCOPY, DILATE BRONCHUS, STENT BRONCHUS, COMBINED N/A 6/11/2018    Procedure: COMBINED BRONCHOSCOPY, DILATE BRONCHUS, STENT BRONCHUS;  Flexible Bronchoscopy, Balloon Dilation, Bronchial Washings;  Surgeon: Wilber Lin MD;  [facility]     BRONCHOSCOPY, DILATE BRONCHUS, STENT BRONCHUS, COMBINED Right 7/10/2018    Procedure: COMBINED BRONCHOSCOPY, DILATE BRONCHUS, STENT BRONCHUS;  Flexible Bronchoscopy, Balloon Dilation, Bronchial Washings  ;  Surgeon: Wilber Lin MD;  [facility]     BRONCHOSCOPY, DILATE BRONCHUS, STENT BRONCHUS, COMBINED N/A 8/2/2018    Procedure: COMBINED BRONCHOSCOPY, DILATE BRONCHUS, STENT BRONCHUS;  Flexible Bronchoscopy, Bronchial Washings, Balloon Dilation;  Surgeon: Wilber Lin MD;  [facility]     BRONCHOSCOPY, DILATE BRONCHUS, STENT BRONCHUS, COMBINED N/A 8/20/2018    Procedure: COMBINED BRONCHOSCOPY, DILATE BRONCHUS, STENT BRONCHUS;  Flexible Bronchoscopy, Balloon Dilation;  Surgeon: Wilber Lin MD;  [facility]     BRONCHOSCOPY, DILATE BRONCHUS, STENT BRONCHUS, COMBINED N/A 10/29/2018    Procedure: Flexible Bronchoscopy, Balloon Dilation, Stent Revision, Airway Examination And Therapeutic Suctioning, Cyro Tumor Debulking;  Surgeon: Wilber Lin MD;  [facility]     BRONCHOSCOPY, DILATE BRONCHUS, STENT BRONCHUS, COMBINED N/A 11/20/2018    Procedure: Rigid Bronchoscopy, Stent Removal and dilitation;  Surgeon: Wilber Lin,  MD;  [facility]     BRONCHOSCOPY, DILATE BRONCHUS, STENT BRONCHUS, COMBINED N/A 12/14/2018    Procedure: Flexible And Rigid Bronchoscopy, Balloon Dilation, Bronchial Washing;  Surgeon: Wilber Lin MD;  [facility]     BRONCHOSCOPY, DILATE BRONCHUS, STENT BRONCHUS, COMBINED N/A 1/17/2019    Procedure: Flexible And Rigid Bronchoscopy, Balloon Dilation.;  Surgeon: Wilber Lin MD;  [facility]     BRONCHOSCOPY, DILATE BRONCHUS, STENT BRONCHUS, COMBINED N/A 3/7/2019    Procedure: Flexible and Rigid Bronchoscopy, Bronchial Washing, Balloon Dilation;  Surgeon: Wilber Lin MD;  [facility]     BRONCHOSCOPY, DILATE BRONCHUS, STENT BRONCHUS, COMBINED N/A 6/6/2019    Procedure: Rigid and Flexible Bronchoscopy, Balloon Dilation;  Surgeon: Wilber Lin MD;  [facility]     BRONCHOSCOPY, DILATE BRONCHUS, STENT BRONCHUS, COMBINED N/A 10/11/2019    Procedure: Flexible and Rigid Bronchoscopy, Balloon Dilation, Bronchoalveolar Lagave;  Surgeon: Wilber Lin MD;  [facility]     CV RIGHT HEART CATH N/A 3/10/2020    Procedure: CV RIGHT HEART CATH;  Surgeon: Wai Garcia MD;  [facility]     ENT SURGERY      tonsillectomy as a child     ESOPHAGOSCOPY, GASTROSCOPY, DUODENOSCOPY (EGD), COMBINED N/A 10/29/2018    Procedure: COMBINED ESOPHAGOSCOPY, GASTROSCOPY, DUODENOSCOPY (EGD) with biopsies and polypectomy;  Surgeon: Chente Bloom MD;  [facility]     INSERT EXTRACORPORAL MEMBRANE OXYGENATOR ADULT (OUTSIDE OR) N/A 2/27/2018    Procedure: INSERT EXTRACORPORAL MEMBRANE OXYGENATOR ADULT (OUTSIDE OR);  INSERT EXTRACORPORAL MEMBRANE OXYGENATOR ADULT (OUTSIDE OR) ;  Surgeon: Toby Hernandez MD;  [facility]     IR CVC TUNNEL PLACEMENT > 5 YRS OF AGE  10/25/2019     IR PARACENTESIS  1/8/2020     IR THORACENTESIS  9/13/2019     IR TRANSCATHETER BIOPSY  1/8/2020     no prior surgery       REMOVE EXTRACORPORAL  MEMBRANE OXYGENATOR ADULT N/A 3/3/2018    Procedure: REMOVE EXTRACORPORAL MEMBRANE OXYGENATOR ADULT;  Removal of Right Femoral Venous and Right Axillary Arterial Extracorporeal Membrane Oxygenator;  Surgeon: Toby Hernandez MD;  [facility]     TRANSPLANT LUNG RECIPIENT SINGLE X2 Bilateral 3/1/2018    Procedure: TRANSPLANT LUNG RECIPIENT SINGLE X2;  Median Sternotomy, Extracorporeal Membrane Oxygenator, bilateral sequential lung transplant;  Surgeon: Toby Hernandez MD;  [facility]       No Known Allergies  Current Outpatient Medications   Medication     albuterol (PROAIR HFA/PROVENTIL HFA/VENTOLIN HFA) 108 (90 Base) MCG/ACT inhaler     amLODIPine (NORVASC) 5 MG tablet     B Complex-C-Folic Acid (DIALYVITE) TABS     benzonatate (TESSALON) 100 MG capsule     calcium-vitamin D (CALTRATE) 600-400 MG-UNIT per tablet     carvedilol (COREG) 25 MG tablet     ferrous sulfate (FEROSUL) 325 (65 Fe) MG tablet     fluticasone (FLONASE) 50 MCG/ACT nasal spray     magnesium oxide (MAG-OX) 400 MG tablet     multivitamin, therapeutic with minerals (THERA-VIT-M) TABS tablet     posaconazole (NOXAFIL) 100 MG EC tablet     predniSONE 2.5 MG PO tablet     pantoprazole (PROTONIX) 40 MG EC tablet     tacrolimus (GENERIC EQUIVALENT) 0.5 MG capsule     tacrolimus (GENERIC EQUIVALENT) 1 MG capsule     No current facility-administered medications for this visit.        ETOH None  TOB Non-smoker    Physical examination  BMI 24. Otherwise deferred (virtual visit)    From a personal perspective, she is at home with her family.     IMPRESSION No diagnosis found.   59 year old female patient with history of bilateral lung transplant now with persistent left Aspergillus empyema. I had an extensive discussion with Ms Blue regarding her condition and treatment options. I explained that given her immunosuppression, renal and liver dysfunction, a lung resection or even a pulmonary decortication would represent a  very high risk for her. Another option would be to perform an open cavernostomy however, this would represent significant morbidity. I discussed her case with Interventional Radiology to entertain the possibility of delivering antifungal-impregnated beads into the left pleural space and watch her evolution. She understands that this is an off-label use however, she has no other less invasive options at this time. If this treatment fails, we still have the option of an open cavernostomy in the future.     PLAN   I reviewed the plan as follows:  I will discuss her case with Dr Mcelroy (transplant pulmonologist), Dr Layton (transplant ID) and Dr Meléndez (Interventional Radiology), to establish the best treatment plan for her and decide if pleural instillation of antibiotic beads would be in her best interest.   She would not be a good surgical candidate for a lung resection or pulmonary decortication at this time.     All questions were answered and Kecia VILLAFANA Satrosalia and present family were in agreement with the plan.  I appreciate the opportunity to participate in the care of your patient and will keep you updated.  Sincerely,    Caryn Lao MD

## 2020-10-10 LAB
MYCOBACTERIUM SPEC CULT: NORMAL
MYCOBACTERIUM SPEC CULT: NORMAL
SPECIMEN SOURCE: NORMAL

## 2020-10-15 ENCOUNTER — EXTERNAL ORDER RESULTS (OUTPATIENT)
Dept: TRANSPLANT | Facility: CLINIC | Age: 60
End: 2020-10-15

## 2020-10-15 ENCOUNTER — TELEPHONE (OUTPATIENT)
Dept: TRANSPLANT | Facility: CLINIC | Age: 60
End: 2020-10-15

## 2020-10-15 NOTE — TELEPHONE ENCOUNTER
Patient reports she hasn't noticed a change in her symptoms since starting minocycline. Cough is the same. She was looking through her labs and noticed her CMV was positive on 10/5/20, 209. States she felt run down the past few days which has happened before with CMV for her. Dialysis had her get a COVID test last Friday which was negative. Will repeat CMV on 10/19/20. Has been on posa for 1 week. Will discuss further with Ame regarding patient's symptoms.

## 2020-10-19 ENCOUNTER — TELEPHONE (OUTPATIENT)
Dept: NEPHROLOGY | Facility: CLINIC | Age: 60
End: 2020-10-19

## 2020-10-19 NOTE — TELEPHONE ENCOUNTER
DATE:  10/19/2020   TIME OF RECEIPT FROM LAB:  11:30 AM  LAB TEST:  Alk phos  LAB VALUE:  614  RESULTS GIVEN WITH READ-BACK TO (PROVIDER):  Nasreen Francois RN  TIME LAB VALUE REPORTED TO PROVIDER:   11:31 AM

## 2020-10-20 ENCOUNTER — TELEPHONE (OUTPATIENT)
Dept: TRANSPLANT | Facility: CLINIC | Age: 60
End: 2020-10-20

## 2020-10-23 ENCOUNTER — DOCUMENTATION ONLY (OUTPATIENT)
Dept: CARE COORDINATION | Facility: CLINIC | Age: 60
End: 2020-10-23

## 2020-10-29 ENCOUNTER — VIRTUAL VISIT (OUTPATIENT)
Dept: INFECTIOUS DISEASES | Facility: CLINIC | Age: 60
End: 2020-10-29
Attending: INTERNAL MEDICINE
Payer: COMMERCIAL

## 2020-10-29 DIAGNOSIS — B44.89 INFECTION BY ASPERGILLUS FUMIGATUS (H): Primary | ICD-10-CM

## 2020-10-29 DIAGNOSIS — Z94.2 LUNG TRANSPLANT STATUS, BILATERAL (H): ICD-10-CM

## 2020-10-29 PROCEDURE — 99214 OFFICE O/P EST MOD 30 MIN: CPT | Mod: 95 | Performed by: INTERNAL MEDICINE

## 2020-10-29 ASSESSMENT — PAIN SCALES - GENERAL: PAINLEVEL: NO PAIN (0)

## 2020-10-29 NOTE — LETTER
"10/29/2020       RE: Kecia Blue  [address] [place] [address]     Dear Colleague,    Thank you for referring your patient, Kecia Blue, to the [facility] [place] at [facility]. Please see a copy of my visit note below.    Kecia Blue is a 59 year old female who is being evaluated via a billable video visit.      The patient has been notified of following:     \"This video visit will be conducted via a call between you and your physician/provider. We have found that certain health care needs can be provided without the need for an in-person physical exam.  This service lets us provide the care you need with a video conversation.  If a prescription is necessary we can send it directly to your pharmacy.  If lab work is needed we can place an order for that and you can then stop by our lab to have the test done at a later time.    Video visits are billed at different rates depending on your insurance coverage.  Please reach out to your insurance provider with any questions.    If during the course of the call the physician/provider feels a video visit is not appropriate, you will not be charged for this service.\"    Patient has given verbal consent for Video visit? Yes  How would you like to obtain your AVS? MyChart    Will anyone else be joining your video visit? No      Video-Visit Details    Type of service:  Video Visit  Video Time: 25 minutes  Originating Location (pt. Location): Home  Distant Location (provider location):  [facility] [place]   Platform used for Video Visit: [employer]   __________________________________________________________________________________  Recommendations:  --Will continue PO Posaconazole 300 mg BID x2 doses followed by posaconazole 300 mg once daily  --10/19/20 Posaconazole level 1570 with in therapeutic range  --Per discussion with surgical " teams, resection is not an option, plan for antifugnal delivery in pleural space-recommend amphotericin-          Vinicio Layton MD  Transplant Infectious Disease  Pager [phone]     Problem List/Assessement:  1. Aspergillus fumigatus lung infection   2. Prior left Aspergillus empyema  3. S/p lung transplant 3/1/2018 (CMV D+/R+, EBV D+/R+).                -Post transplant course has been complicated by right mainstem bronchial stenosis treated with serial dilations-most recently March 2019,      4. ESRD on iHD  5. Liver dysfunction-undetermined etiology  6. Prior hx of CMV viremia  7. Low level EBV viremia     Overall Aspergillus remains susceptible to voriconazole based on recent susceptibility testing. However, there is progression on CT chest with lung mass like consolidation on repeat imaging while on voriconazole. Ideally surgical resection of mass like consolidation would be prefferred but per surgical teams, this would be high risk. At this time they recommend intra-pleural [id number] therapy. There is no good literature to support this. The concern is that that the pH in lung space will inactivate the anti-fungals and there is literature evidence of possible tissue damage from amphotericin when applied directly. If we did consider to proceed, amphotericin would be the drug of choice. However, again difficult to say what dose or concentration to use and then long term effects.     She will likely need to be on long term antifungals (possible indefinitely) and therefore we switched her to Posaconazole given side effects associated with long term use of voriconazole.       ________________________________________________________________________     HPI:     Patient is a 60 yo female with PMHx significant for ILD, seronegative rheumatoid arthritis and dermatomyositis s/p B/L lung transplantation 3/1/2018 (CMV D+/R+, EBV D+/R+). Post transplant course has been complicated by                -right mainstem  bronchial stenosis treated with serial dilations-most recently March 2019,                -Left sided aspergillus empyema 10/08/2019 and                -CMV viremia.   Her immunosuppression includes tacrolimus and prednisone. Her PMHx is also significant for liver dysfunction of unclear etiology with ascites and portal HTN, as well as ESRD now on iHD and being considered for renal transplant  She presents to the ID clinic for further evaluation and management.     Briefly to review relevant medical history,     --Hospitalized 10/6/19-10/12/19--admitted with 3 month hx of left sided upper abd/chest pain. 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 Underwent CT guided biopsy on 10/8/20 which yielded purulent fluid--cx positive for aspergillus fumigatus. She underwent bronchoscopy with BAL on 10/11/20 which showed septate hyphae.  Notably, she had also grown Actinomyces from multiple BAL specimens in the past. She was started on voriconazole at the time.      --Subsequently seen in ID clinic on 12/18/2019. At the time, patient was continued on voriconazole with plans to repeat imaging.     Patient subsequently underwent repaet CT chest on 7/18/20 at OSH and was noted to have interval increased hypodense mass like region in medial aspect of left lower lung. As a result, patient underwent CT guided lung tissue biopsy. Histopath showed acute inflammation, necrosis and myofibroblastic proliferation with focal fungal hyphae highlighted by GMS.  Cultures positive for Aspergillus fumigatus.    Today on follow up she states she is doing well. No symptoms of fevers/chills/sweats/cough/dyspnea/chest pain. She states her energy level is good and denies any fatigue/weakness     ROS: 10 point ROS unremarkable unless stated otherwise in HPI     Exam: (limited on video)  General: Well appearing,  in no acute distress  Chest: Normal work of breathing, on room air  Abd: Non distended  Skin: limited exam with no  rashes/sores  Neuro: alert and oriented x3 grossly moving all extremities.     Relevant Labs  10/19/20 Posaconazole level 1570    Microbiology:    20 Serum fungitell 106  20 Serum Aspergillus EIA 0.06  20 Serum CMV PCR negative  20 Serum EBV PCR 2,935  20 Resp Viral panel negative  20 Sputum Cx: stenotrophomonas maltophilia x2    20 Lung tissue AFB Cx: NGTD  20 Lung tissue Bacterial Cx: NGTD  20 Lung tissue Fungal Cx: Aspergillus fumigatus      Resp viral panel: negative  2019 Resp viral panel: negative  10/25/19 TB quant gold negative  10/11/19 LLL BAL:               Fungal Cx: Aspergillus fumigatus               AFB Cx: NG               Bacterial Cx: NG               Nocardia Cx: NG               Actinomyces rule out: Actinomyces odontolyticus               Aspergillus A.57  10/8/19 Pleural fluid Cx: Aspergillus fumigatus  10/7/19 Serum fungitell: 269  10/7/19 Serum Aspergillus Ag 1.13  19 Serum fungitell 122  19 Serum Aspergillus Ag 1.08  19 Sputum Cx: Stenotrophomonas maltophilia     Imagin/18/20 CT chest:  1. Interval increased hypodense masslike region in the medial left lower lung, appearing   contiguous with a trace adjacent left pleural effusion. This may represent source of focal   infection.   2. Trace right pleural effusion.   3. Interval increased compression of T5 without retropulsion and no acute fracture cleft. Bone   density measurement of 87 HU. This value indicates a high likelihood of osteoporosis, and if   clinically indicated, additional testing might be useful to confirm this diagnosis and/or   monitor for treatment related changes.        Sincerely,    Vinicio Layton MD

## 2020-10-29 NOTE — PROGRESS NOTES
"Kecia Blue is a 59 year old female who is being evaluated via a billable video visit.      The patient has been notified of following:     \"This video visit will be conducted via a call between you and your physician/provider. We have found that certain health care needs can be provided without the need for an in-person physical exam.  This service lets us provide the care you need with a video conversation.  If a prescription is necessary we can send it directly to your pharmacy.  If lab work is needed we can place an order for that and you can then stop by our lab to have the test done at a later time.    Video visits are billed at different rates depending on your insurance coverage.  Please reach out to your insurance provider with any questions.    If during the course of the call the physician/provider feels a video visit is not appropriate, you will not be charged for this service.\"    Patient has given verbal consent for Video visit? Yes  How would you like to obtain your AVS? MyChart    Will anyone else be joining your video visit? No      Video-Visit Details    Type of service:  Video Visit  Video Time: 25 minutes  Originating Location (pt. Location): Home  Distant Location (provider location):  [facility] [place]   Platform used for Video Visit: BioStable   __________________________________________________________________________________  Recommendations:  --Will continue PO Posaconazole 300 mg BID x2 doses followed by posaconazole 300 mg once daily  --10/19/20 Posaconazole level 1570 with in therapeutic range  --Per discussion with surgical teams, resection is not an option, plan for antifugnal delivery in pleural space-recommend amphotericin-          Vinicio Layton MD  Transplant Infectious Disease  Pager [phone]     Problem List/Assessement:  1. Aspergillus fumigatus lung infection   2. Prior left Aspergillus empyema  3. S/p lung transplant 3/1/2018 (CMV " D+/R+, EBV D+/R+).                -Post transplant course has been complicated by right mainstem bronchial stenosis treated with serial dilations-most recently March 2019,      4. ESRD on iHD  5. Liver dysfunction-undetermined etiology  6. Prior hx of CMV viremia  7. Low level EBV viremia     Overall Aspergillus remains susceptible to voriconazole based on recent susceptibility testing. However, there is progression on CT chest with lung mass like consolidation on repeat imaging while on voriconazole. Ideally surgical resection of mass like consolidation would be prefferred but per surgical teams, this would be high risk. At this time they recommend intra-pleural [id number] therapy. There is no good literature to support this. The concern is that that the pH in lung space will inactivate the anti-fungals and there is literature evidence of possible tissue damage from amphotericin when applied directly. If we did consider to proceed, amphotericin would be the drug of choice. However, again difficult to say what dose or concentration to use and then long term effects.     She will likely need to be on long term antifungals (possible indefinitely) and therefore we switched her to Posaconazole given side effects associated with long term use of voriconazole.       ________________________________________________________________________     HPI:     Patient is a 58 yo female with PMHx significant for ILD, seronegative rheumatoid arthritis and dermatomyositis s/p B/L lung transplantation 3/1/2018 (CMV D+/R+, EBV D+/R+). Post transplant course has been complicated by                -right mainstem bronchial stenosis treated with serial dilations-most recently March 2019,                -Left sided aspergillus empyema 10/08/2019 and                -CMV viremia.   Her immunosuppression includes tacrolimus and prednisone. Her PMHx is also significant for liver dysfunction of unclear etiology with ascites and portal HTN, as  well as ESRD now on iHD and being considered for renal transplant  She presents to the ID clinic for further evaluation and management.     Briefly to review relevant medical history,     --Hospitalized 10/6/19-10/12/19--admitted with 3 month hx of left sided upper abd/chest pain. 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 Underwent CT guided biopsy on 10/8/20 which yielded purulent fluid--cx positive for aspergillus fumigatus. She underwent bronchoscopy with BAL on 10/11/20 which showed septate hyphae.  Notably, she had also grown Actinomyces from multiple BAL specimens in the past. She was started on voriconazole at the time.      --Subsequently seen in ID clinic on 12/18/2019. At the time, patient was continued on voriconazole with plans to repeat imaging.     Patient subsequently underwent repaet CT chest on 7/18/20 at OSH and was noted to have interval increased hypodense mass like region in medial aspect of left lower lung. As a result, patient underwent CT guided lung tissue biopsy. Histopath showed acute inflammation, necrosis and myofibroblastic proliferation with focal fungal hyphae highlighted by GMS.  Cultures positive for Aspergillus fumigatus.    Today on follow up she states she is doing well. No symptoms of fevers/chills/sweats/cough/dyspnea/chest pain. She states her energy level is good and denies any fatigue/weakness     ROS: 10 point ROS unremarkable unless stated otherwise in HPI     Exam: (limited on video)  General: Well appearing,  in no acute distress  Chest: Normal work of breathing, on room air  Abd: Non distended  Skin: limited exam with no rashes/sores  Neuro: alert and oriented x3 grossly moving all extremities.     Relevant Labs  10/19/20 Posaconazole level 1570    Microbiology:    9/9/20 Serum fungitell 106  9/9/20 Serum Aspergillus EIA 0.06  9/9/20 Serum CMV PCR negative  9/9/20 Serum EBV PCR 2,935  9/9/20 Resp Viral panel negative  9/9/20 Sputum Cx:  stenotrophomonas maltophilia x2    20 Lung tissue AFB Cx: NGTD  20 Lung tissue Bacterial Cx: NGTD  20 Lung tissue Fungal Cx: Aspergillus fumigatus      Resp viral panel: negative  2019 Resp viral panel: negative  10/25/19 TB quant gold negative  10/11/19 LLL BAL:               Fungal Cx: Aspergillus fumigatus               AFB Cx: NG               Bacterial Cx: NG               Nocardia Cx: NG               Actinomyces rule out: Actinomyces odontolyticus               Aspergillus A.57  10/8/19 Pleural fluid Cx: Aspergillus fumigatus  10/7/19 Serum fungitell: 269  10/7/19 Serum Aspergillus Ag 1.13  19 Serum fungitell 122  19 Serum Aspergillus Ag 1.08  19 Sputum Cx: Stenotrophomonas maltophilia     Imagin/18/20 CT chest:  1. Interval increased hypodense masslike region in the medial left lower lung, appearing   contiguous with a trace adjacent left pleural effusion. This may represent source of focal   infection.   2. Trace right pleural effusion.   3. Interval increased compression of T5 without retropulsion and no acute fracture cleft. Bone   density measurement of 87 HU. This value indicates a high likelihood of osteoporosis, and if   clinically indicated, additional testing might be useful to confirm this diagnosis and/or   monitor for treatment related changes.

## 2020-11-02 DIAGNOSIS — J84.9 ILD (INTERSTITIAL LUNG DISEASE) (H): Primary | ICD-10-CM

## 2020-11-02 DIAGNOSIS — B25.9 CYTOMEGALOVIRUS (CMV) VIREMIA (H): ICD-10-CM

## 2020-11-02 DIAGNOSIS — Z94.2 S/P LUNG TRANSPLANT (H): ICD-10-CM

## 2020-11-04 ENCOUNTER — TELEPHONE (OUTPATIENT)
Dept: TRANSPLANT | Facility: CLINIC | Age: 60
End: 2020-11-04

## 2020-11-04 DIAGNOSIS — Z94.2 LUNG REPLACED BY TRANSPLANT (H): ICD-10-CM

## 2020-11-04 PROCEDURE — 80197 ASSAY OF TACROLIMUS: CPT | Performed by: PHYSICIAN ASSISTANT

## 2020-11-04 NOTE — TELEPHONE ENCOUNTER
DATE:  11/4/2020   TIME OF RECEIPT FROM LAB:  10:20  LAB TEST:  Alkaline phosphate  LAB VALUE:  429  RESULTS GIVEN WITH READ-BACK TO (PROVIDER):  Gaye Castaneda  TIME LAB VALUE REPORTED TO PROVIDER:   10:35

## 2020-11-04 NOTE — TELEPHONE ENCOUNTER
Coordinator called with critical lab: alk phos 429.   Patient baseline alk phos 500-600s, labs are stable.

## 2020-11-05 DIAGNOSIS — Z11.59 ENCOUNTER FOR SCREENING FOR OTHER VIRAL DISEASES: Primary | ICD-10-CM

## 2020-11-05 LAB
TACROLIMUS BLD-MCNC: 9.1 UG/L (ref 5–15)
TME LAST DOSE: NORMAL H

## 2020-11-17 ENCOUNTER — TELEPHONE (OUTPATIENT)
Dept: TRANSPLANT | Facility: CLINIC | Age: 60
End: 2020-11-17

## 2020-11-17 NOTE — TELEPHONE ENCOUNTER
Pt has 2 upcoming appointments  Needs to discuss with the coordinator if she needs to have the one for tomorrow

## 2020-11-18 ENCOUNTER — TELEPHONE (OUTPATIENT)
Dept: INTERVENTIONAL RADIOLOGY/VASCULAR | Facility: CLINIC | Age: 60
End: 2020-11-18

## 2020-11-20 ENCOUNTER — APPOINTMENT (OUTPATIENT)
Dept: MEDSURG UNIT | Facility: CLINIC | Age: 60
End: 2020-11-20
Attending: RADIOLOGY
Payer: COMMERCIAL

## 2020-11-20 ENCOUNTER — APPOINTMENT (OUTPATIENT)
Dept: INTERVENTIONAL RADIOLOGY/VASCULAR | Facility: CLINIC | Age: 60
End: 2020-11-20
Attending: RADIOLOGY
Payer: COMMERCIAL

## 2020-11-20 ENCOUNTER — HOSPITAL ENCOUNTER (OUTPATIENT)
Facility: CLINIC | Age: 60
Discharge: HOME | End: 2020-11-20
Attending: RADIOLOGY | Admitting: RADIOLOGY
Payer: COMMERCIAL

## 2020-11-20 ENCOUNTER — APPOINTMENT (OUTPATIENT)
Dept: GENERAL RADIOLOGY | Facility: CLINIC | Age: 60
End: 2020-11-20
Attending: PHYSICIAN ASSISTANT
Payer: COMMERCIAL

## 2020-11-20 VITALS
BODY MASS INDEX: 22.15 KG/M2 | DIASTOLIC BLOOD PRESSURE: 60 MMHG | WEIGHT: 125 LBS | RESPIRATION RATE: 24 BRPM | HEART RATE: 79 BPM | TEMPERATURE: 98 F | OXYGEN SATURATION: 94 % | SYSTOLIC BLOOD PRESSURE: 86 MMHG | HEIGHT: 63 IN

## 2020-11-20 DIAGNOSIS — B25.9 CYTOMEGALOVIRUS (CMV) VIREMIA (H): ICD-10-CM

## 2020-11-20 DIAGNOSIS — Z94.2 S/P LUNG TRANSPLANT (H): ICD-10-CM

## 2020-11-20 DIAGNOSIS — J84.9 ILD (INTERSTITIAL LUNG DISEASE) (H): ICD-10-CM

## 2020-11-20 LAB
ERYTHROCYTE [DISTWIDTH] IN BLOOD BY AUTOMATED COUNT: 15.6 % (ref 10–15)
GRAM STN SPEC: NORMAL
GRAM STN SPEC: NORMAL
HCT VFR BLD AUTO: 33.6 % (ref 35–47)
HGB BLD-MCNC: 10.3 G/DL (ref 11.7–15.7)
INR PPP: 0.94 (ref 0.86–1.14)
MCH RBC QN AUTO: 29.8 PG (ref 26.5–33)
MCHC RBC AUTO-ENTMCNC: 30.7 G/DL (ref 31.5–36.5)
MCV RBC AUTO: 97 FL (ref 78–100)
PLATELET # BLD AUTO: 161 10E9/L (ref 150–450)
RBC # BLD AUTO: 3.46 10E12/L (ref 3.8–5.2)
SPECIMEN SOURCE: NORMAL
WBC # BLD AUTO: 6.3 10E9/L (ref 4–11)

## 2020-11-20 PROCEDURE — 999N000065 XR CHEST 1 VW

## 2020-11-20 PROCEDURE — 250N000009 HC RX 250: Performed by: STUDENT IN AN ORGANIZED HEALTH CARE EDUCATION/TRAINING PROGRAM

## 2020-11-20 PROCEDURE — 32560 TREAT PLEURODESIS W/AGENT: CPT

## 2020-11-20 PROCEDURE — 272N000686 HC HEADBAND, CERIBELL EEG

## 2020-11-20 PROCEDURE — 272N000566 HC SHEATH CR3

## 2020-11-20 PROCEDURE — 87102 FUNGUS ISOLATION CULTURE: CPT | Performed by: RADIOLOGY

## 2020-11-20 PROCEDURE — C1769 GUIDE WIRE: HCPCS

## 2020-11-20 PROCEDURE — 272N000192 HC ACCESSORY CR2

## 2020-11-20 PROCEDURE — 999N000132 HC STATISTIC PP CARE STAGE 1

## 2020-11-20 PROCEDURE — 71045 X-RAY EXAM CHEST 1 VIEW: CPT | Mod: 26 | Performed by: RADIOLOGY

## 2020-11-20 PROCEDURE — 32551 INSERTION OF CHEST TUBE: CPT

## 2020-11-20 PROCEDURE — 87070 CULTURE OTHR SPECIMN AEROBIC: CPT | Performed by: RADIOLOGY

## 2020-11-20 PROCEDURE — 99153 MOD SED SAME PHYS/QHP EA: CPT

## 2020-11-20 PROCEDURE — 272N000500 HC NEEDLE CR2

## 2020-11-20 PROCEDURE — 85610 PROTHROMBIN TIME: CPT | Performed by: PHYSICIAN ASSISTANT

## 2020-11-20 PROCEDURE — 250N000011 HC RX IP 250 OP 636: Performed by: RADIOLOGY

## 2020-11-20 PROCEDURE — 258N000003 HC RX IP 258 OP 636: Performed by: PHYSICIAN ASSISTANT

## 2020-11-20 PROCEDURE — 99152 MOD SED SAME PHYS/QHP 5/>YRS: CPT

## 2020-11-20 PROCEDURE — 32557 INSERT CATH PLEURA W/ IMAGE: CPT | Mod: LT | Performed by: RADIOLOGY

## 2020-11-20 PROCEDURE — 21501 I&D DP ABSC/HMTMA SFT TS NCK: CPT

## 2020-11-20 PROCEDURE — 87205 SMEAR GRAM STAIN: CPT | Performed by: RADIOLOGY

## 2020-11-20 PROCEDURE — 250N000011 HC RX IP 250 OP 636: Performed by: STUDENT IN AN ORGANIZED HEALTH CARE EDUCATION/TRAINING PROGRAM

## 2020-11-20 PROCEDURE — 85027 COMPLETE CBC AUTOMATED: CPT | Performed by: PHYSICIAN ASSISTANT

## 2020-11-20 PROCEDURE — 99152 MOD SED SAME PHYS/QHP 5/>YRS: CPT | Performed by: RADIOLOGY

## 2020-11-20 RX ORDER — LIDOCAINE 40 MG/G
CREAM TOPICAL
Status: DISCONTINUED | OUTPATIENT
Start: 2020-11-20 | End: 2020-11-20 | Stop reason: HOSPADM

## 2020-11-20 RX ORDER — AMPHOTERICIN B 50 MG/10ML
100 INJECTION, POWDER, LYOPHILIZED, FOR SOLUTION INTRAVENOUS ONCE
Status: COMPLETED | OUTPATIENT
Start: 2020-11-20 | End: 2020-11-20

## 2020-11-20 RX ORDER — ONDANSETRON 2 MG/ML
4 INJECTION INTRAMUSCULAR; INTRAVENOUS ONCE
Status: COMPLETED | OUTPATIENT
Start: 2020-11-20 | End: 2020-11-20

## 2020-11-20 RX ORDER — FENTANYL CITRATE 50 UG/ML
25-50 INJECTION, SOLUTION INTRAMUSCULAR; INTRAVENOUS EVERY 5 MIN PRN
Status: DISCONTINUED | OUTPATIENT
Start: 2020-11-20 | End: 2020-11-20 | Stop reason: HOSPADM

## 2020-11-20 RX ORDER — FLUMAZENIL 0.1 MG/ML
0.2 INJECTION, SOLUTION INTRAVENOUS
Status: DISCONTINUED | OUTPATIENT
Start: 2020-11-20 | End: 2020-11-20 | Stop reason: HOSPADM

## 2020-11-20 RX ORDER — NALOXONE HYDROCHLORIDE 0.4 MG/ML
.1-.4 INJECTION, SOLUTION INTRAMUSCULAR; INTRAVENOUS; SUBCUTANEOUS
Status: DISCONTINUED | OUTPATIENT
Start: 2020-11-20 | End: 2020-11-20 | Stop reason: HOSPADM

## 2020-11-20 RX ORDER — DEXTROSE MONOHYDRATE 25 G/50ML
25-50 INJECTION, SOLUTION INTRAVENOUS
Status: DISCONTINUED | OUTPATIENT
Start: 2020-11-20 | End: 2020-11-20 | Stop reason: HOSPADM

## 2020-11-20 RX ORDER — NICOTINE POLACRILEX 4 MG
15-30 LOZENGE BUCCAL
Status: DISCONTINUED | OUTPATIENT
Start: 2020-11-20 | End: 2020-11-20 | Stop reason: HOSPADM

## 2020-11-20 RX ORDER — SODIUM CHLORIDE 9 MG/ML
INJECTION, SOLUTION INTRAVENOUS CONTINUOUS
Status: DISCONTINUED | OUTPATIENT
Start: 2020-11-20 | End: 2020-11-20 | Stop reason: HOSPADM

## 2020-11-20 RX ADMIN — MIDAZOLAM 0.5 MG: 1 INJECTION INTRAMUSCULAR; INTRAVENOUS at 11:59

## 2020-11-20 RX ADMIN — FENTANYL CITRATE 25 MCG: 50 INJECTION, SOLUTION INTRAMUSCULAR; INTRAVENOUS at 11:32

## 2020-11-20 RX ADMIN — MIDAZOLAM 0.5 MG: 1 INJECTION INTRAMUSCULAR; INTRAVENOUS at 11:32

## 2020-11-20 RX ADMIN — ONDANSETRON 4 MG: 2 INJECTION INTRAMUSCULAR; INTRAVENOUS at 10:59

## 2020-11-20 RX ADMIN — FENTANYL CITRATE 25 MCG: 50 INJECTION, SOLUTION INTRAMUSCULAR; INTRAVENOUS at 11:45

## 2020-11-20 RX ADMIN — MIDAZOLAM 0.5 MG: 1 INJECTION INTRAMUSCULAR; INTRAVENOUS at 11:16

## 2020-11-20 RX ADMIN — FENTANYL CITRATE 25 MCG: 50 INJECTION, SOLUTION INTRAMUSCULAR; INTRAVENOUS at 11:16

## 2020-11-20 RX ADMIN — LIDOCAINE HYDROCHLORIDE 6 ML: 10 INJECTION, SOLUTION EPIDURAL; INFILTRATION; INTRACAUDAL; PERINEURAL at 12:10

## 2020-11-20 RX ADMIN — MIDAZOLAM 1 MG: 1 INJECTION INTRAMUSCULAR; INTRAVENOUS at 10:58

## 2020-11-20 RX ADMIN — FENTANYL CITRATE 25 MCG: 50 INJECTION, SOLUTION INTRAMUSCULAR; INTRAVENOUS at 11:59

## 2020-11-20 RX ADMIN — SODIUM CHLORIDE: 9 INJECTION, SOLUTION INTRAVENOUS at 08:29

## 2020-11-20 RX ADMIN — FENTANYL CITRATE 50 MCG: 50 INJECTION, SOLUTION INTRAMUSCULAR; INTRAVENOUS at 10:58

## 2020-11-20 RX ADMIN — MIDAZOLAM 0.5 MG: 1 INJECTION INTRAMUSCULAR; INTRAVENOUS at 11:45

## 2020-11-20 RX ADMIN — AMPHOTERICIN B 100 MG: 50 INJECTION, POWDER, LYOPHILIZED, FOR SOLUTION INTRAVENOUS at 11:16

## 2020-11-20 ASSESSMENT — MIFFLIN-ST. JEOR: SCORE: 1121.13

## 2020-11-20 NOTE — IP AVS SNAPSHOT
[facility] [address] [address]                                    After Visit Summary   11/20/2020    Kecia Blue    MRN: [id number]           After Visit Summary Signature Page    I have received my discharge instructions, and my questions have been answered. I have discussed any challenges I see with this plan with the nurse or doctor.    ..........................................................................................................................................  Patient/Patient Representative Signature      ..........................................................................................................................................  Patient Representative Print Name and Relationship to Patient    ..................................................               ................................................  Date                                   Time    ..........................................................................................................................................  Reviewed by Signature/Title    ...................................................              ..............................................  Date                                               Time          22EPIC Rev 08/18

## 2020-11-20 NOTE — PROGRESS NOTES
Patient returned to 2A post left intrapleural antimicrobial bead instillation.  VSS.  Denies pain.  Left mid back site C/D/I, no hematoma.  PO food and fluids at bedside.  Awaiting post-procedure orders.

## 2020-11-20 NOTE — PROGRESS NOTES
Patient Name: Kecia Blue  Medical Record Number: [id number]  Today's Date: 11/20/2020    Procedure: Image guided left pleural space access with instillation of antimicrobial beads  Proceduralist: Dr. Mckeon, Dr. Yon Orellana    Procedure Start: 1056  Procedure end: 1215  Sedation medications administered: 150mcg fentanyl, 3mg versed     4mg IV Zofran at 1100    Report given to: Sharlene GIL 2A  : FATMATA    Other Notes: Pt arrived to IR room CT2 from . Consent reviewed. Pt denies any questions or concerns regarding procedure. Pt positioned prone and monitored per protocol. Pt tolerated procedure without any noted complications. Pt transferred back to .    30ml pleural fluid removed. Labs sent as ordered.

## 2020-11-20 NOTE — PROGRESS NOTES
Patient tolerated recovery stage well. VSS, left mid back site clean/dry/intact, no hematoma, and denies pain.  Post procedure CXR without pneumothorax. Patient tolerated PO food and fluids. Teaching was done and discharge instructions were given. Patient ambulated, voided, and PIV was removed. Patient discharged from the hospital via wheel chair to home with spouse @ 1345.

## 2020-11-20 NOTE — DISCHARGE INSTRUCTIONS
[facility],   Interventional Radiology Discharge Instructions Following Intrapleural Antifungal Bead Instillation        AFTER YOU GO HOME:  ? Resume previous diet and medications  ? Limit strenuous physical activity such as lifting, straining, or exercising for 48 hours.  You may resume normal activity in 24 hours  ? Change gauze at the puncture site as needed to keep site dry.  Leaking of additional fluid is not uncommon during the first 24-48 hours    CALL THE PHYSICIAN:  ? If you develop a fever, shortness of breath, chest pain, cough up blood, excessive bleeding from the puncture site, severe lightheadedness, or fainting call you doctor immediately or come to the closest Emergency Department.  Do not drive yourself.   ? If you have questions or concerns regarding your procedure call the radiologist.    [facility] INTERVENTIONAL RADIOLOGY DEPARTMENT  Procedure Physician: Dr. Mckeon and Dr. Orellana                               Date of procedure: November 20, 2020  Telephone Numbers: [phone]       Monday-Friday 8:00 am to 4:30 pm                                                   [phone]   After 4:30 pm Monday - Friday, Ask for the Interventional Radiologist on call.  Someone is on call 24 hrs/day  [facility] toll free number: [phone]    Monday-Friday 8:00 am to 4:30 pm  [facility] Emergency Dept: [phone]  _

## 2020-11-20 NOTE — IP AVS SNAPSHOT
MRN:[id number]                      After Visit Summary   11/20/2020    Kecia Blue    MRN: [id number]           Visit Information        Department      11/20/2020  7:35 AM [facility] [address] [place]          Review of your medicines      UNREVIEWED medicines. Ask your doctor about these medicines       Dose / Directions   albuterol 108 (90 Base) MCG/ACT inhaler  Commonly known as: PROAIR HFA/PROVENTIL HFA/VENTOLIN HFA  Used for: Lung replaced by transplant (H)      Dose: 2 puff  Inhale 2 puffs into the lungs every 4 hours as needed for shortness of breath / dyspnea or wheezing  Quantity: 1 Inhaler  Refills: 1     amLODIPine 5 MG tablet  Commonly known as: NORVASC  Used for: Hypertension      Dose: 10 mg  Take 2 tablets (10 mg) by mouth daily  Quantity: 60 tablet  Refills: 11     benzonatate 100 MG capsule  Commonly known as: TESSALON  Used for: S/P lung transplant (H)      Dose: 100 mg  Take 1 capsule (100 mg) by mouth 3 times daily as needed for cough  Quantity: 100 capsule  Refills: 0     calcium carbonate 600 mg-vitamin D 400 units 600-400 MG-UNIT per tablet  Commonly known as: CALTRATE  Used for: S/P lung transplant (H), ILD (interstitial lung disease) (H), Lung transplant recipient (H), Encounter for long-term (current) use of high-risk medication, Anemia, unspecified type, Cytomegalovirus (CMV) viremia (H)      Dose: 1 tablet  Take 1 tablet by mouth daily  Refills: 0     carvedilol 25 MG tablet  Commonly known as: COREG  Used for: Essential hypertension      Dose: 25 mg  Take 1 tablet (25 mg) by mouth 2 times daily (with meals)  Quantity: 60 tablet  Refills: 11     Dialyvite Tabs      Dose: 1 tablet  Take 1 tablet by mouth daily  Refills: 0     ferrous sulfate 325 (65 Fe) MG tablet  Commonly known as: FEROSUL  Used for: S/P lung transplant (H)      Dose: 325 mg  Take 1 tablet (325 mg) by mouth daily (with breakfast)  Quantity: 60 tablet  Refills: 2     fluticasone 50  MCG/ACT nasal spray  Commonly known as: FLONASE  Used for: S/P lung transplant (H)      Dose: 1 spray  Spray 1 spray into both nostrils daily  Quantity: 15.8 mL  Refills: 0     magnesium oxide 400 MG tablet  Commonly known as: MAG-OX      Dose: 400 mg  Take 400 mg by mouth daily  Refills: 0     multivitamin w/minerals tablet  Used for: Lung transplant recipient (H)      Dose: 1 tablet  Take 1 tablet by mouth daily  Quantity: 30 each  Refills: 11     pantoprazole 40 MG EC tablet  Commonly known as: PROTONIX  Used for: Gastroesophageal reflux disease without esophagitis, ILD (interstitial lung disease) (H), Lung transplant recipient (H)      Dose: 40 mg  Take 1 tablet (40 mg) by mouth daily  Quantity: 30 tablet  Refills: 11     posaconazole 100 MG EC tablet  Commonly known as: NOXAFIL  Used for: Aspergillus (H), Lung replaced by transplant (H)      Dose: 300 mg  Take 3 tablets (300 mg) by mouth daily Take three tablets (300mg) by mouth twice a day first day of treatment. Then take 300mg once daily.  Quantity: 90 tablet  Refills: 2     predniSONE 2.5 MG tablet  Commonly known as: DELTASONE  Used for: Lung replaced by transplant (H)      Take two tablets (5mg) every AM and one tablet (2.5mg) every evening  Quantity: 90 tablet  Refills: 11     * tacrolimus 1 MG capsule  Commonly known as: GENERIC EQUIVALENT  Used for: S/P lung transplant (H)      Dose: 1 mg  Take 1 capsule (1 mg) by mouth every morning Total dose: 1 mg in the AM and 0.5 mg in the PM  Quantity: 30 capsule  Refills: 11     * tacrolimus 0.5 MG capsule  Commonly known as: GENERIC EQUIVALENT  Used for: S/P lung transplant (H)      Dose: 0.5 mg  Take 1 capsule (0.5 mg) by mouth every evening Total dose: 1 mg in the AM and 0.5 mg in the PM  Quantity: 30 capsule  Refills: 11         * This list has 2 medication(s) that are the same as other medications prescribed for you. Read the directions carefully, and ask your doctor or other care provider to review them  with you.                  Protect others around you: Learn how to safely use, store and throw away your medicines at www.disposemymeds.org.       Follow-ups after your visit       Your next 10 appointments already scheduled    Dec 21, 2020 11:00 AM  (Arrive by 10:45 AM)  Return Visit with Feng Denise MD  [facility] [place] ([facility] [place]) [address] [place] [id number] [phone]         Care Instructions       Further instructions from your care team         [facility],   Interventional Radiology Discharge Instructions Following Intrapleural Antifungal Bead Instillation        AFTER YOU GO HOME:  ? Resume previous diet and medications  ? Limit strenuous physical activity such as lifting, straining, or exercising for 48 hours.  You may resume normal activity in 24 hours  ? Change gauze at the puncture site as needed to keep site dry.  Leaking of additional fluid is not uncommon during the first 24-48 hours    CALL THE PHYSICIAN:  ? If you develop a fever, shortness of breath, chest pain, cough up blood, excessive bleeding from the puncture site, severe lightheadedness, or fainting call you doctor immediately or come to the closest Emergency Department.  Do not drive yourself.   ? If you have questions or concerns regarding your procedure call the radiologist.    [facility] INTERVENTIONAL RADIOLOGY DEPARTMENT  Procedure Physician: Dr. Mckeon and Dr. Orellana                               Date of procedure: November 20, 2020  Telephone Numbers: [phone]       Monday-Friday 8:00 am to 4:30 pm                                                   [phone]   After 4:30 pm Monday - Friday, Ask for the Interventional Radiologist on call.  Someone is on call 24 hrs/day  [facility] toll free number: [phone]    Monday-Friday 8:00 am to 4:30 pm  [facility] Emergency Dept: [phone]  _            Additional Information  "About Your Visit       [employer] Information    [facility] gives you secure access to your electronic health record. If you see a primary care provider, you can also send messages to your care team and make appointments. If you have questions, please call your primary care clinic.  If you do not have a primary care provider, please call [phone] and they will assist you.       Care EveryWhere ID    This is your Care EveryWhere ID. This could be used by other organizations to access your [facility] medical records  [id number]       Your Vitals Were  Most recent update: 11/20/2020  1:30 PM    Blood Pressure   86/60   (BP Location: Right arm)          Pulse   79          Temperature   98  F (36.7  C) (Oral)          Respirations   24          Height   1.6 m (5' 3\")             Weight   56.7 kg (125 lb)    Last Period   06/07/2014 (Exact Date)    Pulse Oximetry   94%    BMI (Body Mass Index)   22.14 kg/m           Primary Care Provider Office Phone # Fax #    Rosy Vu [phone]      Equal Access to Services    [facility]: Hadii aad ku hadasho Soomaali, waaxda luqadaha, qaybta kaalmada adeegyada, waxay pennie haymckayla daily . So [facility] [phone].    ATENCIÓN: Si habla español, tiene a taylor disposición servicios gratuitos de asistencia lingüística. Llame al [phone].    We comply with applicable federal and state civil rights laws, including the [place] Human Rights Act. We do not discriminate on the basis of race, color, creed, Holiness, national origin, marital status, age, disability, sex, sexual orientation, or gender identity.       Thank you!    Thank you for choosing [facility] for your care. Our goal is always to provide you with excellent care. Hearing back from our patients is one way we can continue to improve our services. Please take a few minutes to complete the written survey that you may receive in the mail after you visit with us. Thank you!          "   Medication List      ASK your doctor about these medications          Morning Afternoon Evening Bedtime As Needed    albuterol 108 (90 Base) MCG/ACT inhaler  Also known as: PROAIR HFA/PROVENTIL HFA/VENTOLIN HFA  INSTRUCTIONS: Inhale 2 puffs into the lungs every 4 hours as needed for shortness of breath / dyspnea or wheezing  Doctor's comments: Pharmacy may dispense brand covered by insurance (Proair, or proventil or ventolin or generic albuterol inhaler)                     amLODIPine 5 MG tablet  Also known as: NORVASC  INSTRUCTIONS: Take 2 tablets (10 mg) by mouth daily                     benzonatate 100 MG capsule  Also known as: TESSALON  INSTRUCTIONS: Take 1 capsule (100 mg) by mouth 3 times daily as needed for cough                     calcium carbonate 600 mg-vitamin D 400 units 600-400 MG-UNIT per tablet  Also known as: CALTRATE  INSTRUCTIONS: Take 1 tablet by mouth daily                     carvedilol 25 MG tablet  Also known as: COREG  INSTRUCTIONS: Take 1 tablet (25 mg) by mouth 2 times daily (with meals)                     Dialyvite Tabs  INSTRUCTIONS: Take 1 tablet by mouth daily                     ferrous sulfate 325 (65 Fe) MG tablet  Also known as: FEROSUL  INSTRUCTIONS: Take 1 tablet (325 mg) by mouth daily (with breakfast)                     fluticasone 50 MCG/ACT nasal spray  Also known as: FLONASE  INSTRUCTIONS: Spray 1 spray into both nostrils daily                     magnesium oxide 400 MG tablet  Also known as: MAG-OX  INSTRUCTIONS: Take 400 mg by mouth daily                     multivitamin w/minerals tablet  INSTRUCTIONS: Take 1 tablet by mouth daily                     pantoprazole 40 MG EC tablet  Also known as: PROTONIX  INSTRUCTIONS: Take 1 tablet (40 mg) by mouth daily                     posaconazole 100 MG EC tablet  Also known as: NOXAFIL  INSTRUCTIONS: Take 3 tablets (300 mg) by mouth daily Take three tablets (300mg) by mouth twice a day first day of treatment. Then take  300mg once daily.                     predniSONE 2.5 MG tablet  Also known as: DELTASONE  INSTRUCTIONS: Take two tablets (5mg) every AM and one tablet (2.5mg) every evening  Doctor's comments: TXP DT 3/1/2018 (Lung) TXP Dischg DT 3/24/2018 DX Lung replaced by transplant Z94.2 TX Center [facility] ([place], [place])                     * tacrolimus 1 MG capsule  Also known as: GENERIC EQUIVALENT  INSTRUCTIONS: Take 1 capsule (1 mg) by mouth every morning Total dose: 1 mg in the AM and 0.5 mg in the PM                     * tacrolimus 0.5 MG capsule  Also known as: GENERIC EQUIVALENT  INSTRUCTIONS: Take 1 capsule (0.5 mg) by mouth every evening Total dose: 1 mg in the AM and 0.5 mg in the PM                        * This list has 2 medication(s) that are the same as other medications prescribed for you. Read the directions carefully, and ask your doctor or other care provider to review them with you.

## 2020-11-20 NOTE — SEDATION DOCUMENTATION
[facility] Procedure Note        Sedation:      Performed by: Ken Estrella DO  Authorized by: Ken Estrella DO    Pre-Procedure Assessment done at 0900.    Expected Level:  Moderate Sedation    Indication:  Sedation is required to allow for Pleural space access    Consent obtained from patient after discussing the risks, benefits and alternatives.    PO Intake:  Appropriately NPO for procedure    ASA Class:  Class 2 - MILD SYSTEMIC DISEASE, NO ACUTE PROBLEMS, NO FUNCTIONAL LIMITATIONS.    Mallampati:  Grade 1:  Soft palate, uvula, tonsillar pillars, and posterior pharyngeal wall visible    Lungs: Lungs Clear with good breath sounds bilaterally.     Heart: Normal heart sounds and rate    History and physical reviewed and no updates needed. I have reviewed the lab findings, diagnostic data, medications, and the plan for sedation. I have determined this patient to be an appropriate candidate for the planned sedation and procedure and have reassessed the patient IMMEDIATELY PRIOR to sedation and procedure.

## 2020-11-20 NOTE — PROGRESS NOTES
Patient arrived on 2A for intrapleural antifungal bead placement.  IV placed, labs sent.  HCG discontinued as patient is post-menopausal.  Awaiting consent, otherwise prep complete.

## 2020-11-23 ENCOUNTER — TELEPHONE (OUTPATIENT)
Dept: TRANSPLANT | Facility: CLINIC | Age: 60
End: 2020-11-23

## 2020-11-23 DIAGNOSIS — Z94.2 LUNG REPLACED BY TRANSPLANT (H): Primary | ICD-10-CM

## 2020-11-23 NOTE — TELEPHONE ENCOUNTER
Confirming with Kecia that 12/3 at 8 am with Ame Chow RTC works for her and she confirmed. Messaged schedulers to get this scheduled. Chest xray, PFTS, labs prior to appointment. Patient agrees with plan. Also had a question regarding Posaconazole and antifungal medication injection and whether or not to continue Posa. Will confirm with Ame.

## 2020-11-24 ENCOUNTER — TELEPHONE (OUTPATIENT)
Dept: TRANSPLANT | Facility: CLINIC | Age: 60
End: 2020-11-24

## 2020-11-24 NOTE — TELEPHONE ENCOUNTER
Scheduling conflict with 12/3 appt with Ame patient has dialysis Mondays and Thursdays. Slot held for 12/9 with Ame at 840 with labs, chest xray and pfts prior to appt. Messaged schedulers. Patient confirmed time works for inperson clinic visit.

## 2020-11-25 LAB
BACTERIA SPEC CULT: NO GROWTH
SPECIMEN SOURCE: NORMAL

## 2020-11-27 DIAGNOSIS — I27.20 PULMONARY HYPERTENSION (H): Primary | ICD-10-CM

## 2020-12-07 NOTE — PROGRESS NOTES
"[facility]  December 8, 2020         Assessment and Plan:   Kecia Blue is a 59 year old female with a h/o of bilateral lung transplant on 3/1/18 for ILD associated with connective tissue disease. 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      1. S/p bilateral lung transplant: overall improved, less cough, no fever or chills. No dyspnea at rest, notes she does get dyspneic with activity and wonders if some of her symptoms are related to deconditioning. Sating 93% on room air. DSA 2/19/20 negative. CXR reviewed by me today demonstrates slight worsening of left basilar opacities and improvement in right effusion. PFTs today down 5% from prior, within her recent baseline, but down 9% over the last year.  - CT chest today with expiratory views   - 6 MWT today to assess need for O2 with activity  - Continue two drug IS including tacrolimus (goal 80-10) and prednisone (for recurrent infections)   - PCP prophylaxis discontinued for CD4 count > 200 (7/28)  - Pulmonary rehab referral     2. Right mainstem bronchial stenosis: s/p dilation, most recently 3/19.   - Monitor     3. Ascites, elevated alk phos and portal hypertension: initially thought to be related to chronic elevated right-sided heart pressures on liver biopsy with pre-lung transplant PA presssure of 98/45, mean 61 mm hg. However, right heart cath on 3/10/20 showed only mild PAH. Possibly related to \"residual\" disease\" from prior congestion with decompensation, at which time she develped ascites. Alk phos elevation likely secondary to her azole use, stable.   - LFTs added on today  - Following with Hepatology     4. H/o left-sided aspergillus empyema: first noted on 10/08/2019. CT scan repeated on 7/17/20 showed increased mass-like density, likely pleural-based, in left lower lung " area s/p needle aspiration on 8/13/20, now + for aspergillus fumigatus s/p intrapleural bead placement (amphoterecin) on 11/20. Following with ID.  - Continue posaconazole indefinitely  - LFTs monitoring per above  - Follow up with ID     5. H/o CMV viremia: last CMV on 11/4 negative.   - Pending for today     6. H/o EBV viremia: last level of 2935 on 9/9/20.  - EBV pending for today     7. Dialysis-dependent renal failure: changed from 3 times/week HD to to 2 times/week HD on M/Th. Hoping to start dialyzing through her left arm loop graft. Will determine plan for renal transplant work up once we have a plan for the aspergillus in place.      8. HTN: controlled.  - Continue amlodipine and carvedilol    9. Hypomagnesemia: Mg of 1.5, missed about 3 days recently because she was out of medications.   - Monitor     RTC: 3 months in person  Influenza and other vaccinations: influenza at dialysis on Thursday per Kecia  Annual dermatology visit:     Ame Chow PA-C  Pulmonary, Allergy, Critical Care and Sleep Medicine        Interval History:     Overall, is feeling well, however, noticed when she does activities she is short of breath. But she is still in the 90s with her sats. Wonders if she is no getting enough activity in her life. No shortness of breath at rest. No fever or chills, cough has improved overall, not only in the mornings after Kecia wakes up. Got a new bed with an elevated head and it's helping her. No chest pain or palpitations. No bloating or gas, stools are formed and once/day.           Review of Systems:   Please see HPI, otherwise the complete 10 point ROS is negative.           Past Medical and Surgical History:     Past Medical History:   Diagnosis Date     Abdominal pain 10/6/2019     Acute on chronic respiratory failure with hypoxia (H) 2/21/2018     Antisynthetase syndrome (H) 2014     Chronic cough      Chronic infection     recent C diff  8/18     Dehydration 8/1/2018     Dehydration  8/1/2018     Dermatomyositis (H)      Dysplasia of cervix, low grade (ESTRADA 1)      ILD (interstitial lung disease) (H)      Nausea and vomiting 12/4/2018     Osteopenia      PONV (postoperative nausea and vomiting)      Pulmonary hypertension (H)      Raynaud's disease      Seronegative rheumatoid arthritis (H)      Past Surgical History:   Procedure Laterality Date     BRONCHOSCOPY (RIGID OR FLEXIBLE), DIAGNOSTIC N/A 4/10/2018    Procedure: COMBINED BRONCHOSCOPY (RIGID OR FLEXIBLE), LAVAGE;;  Surgeon: Mariposa Donohue MD;  [facility]     BRONCHOSCOPY (RIGID OR FLEXIBLE), DILATE BRONCHUS / TRACHEA N/A 10/11/2018    Procedure: BRONCHOSCOPY (RIGID OR FLEXIBLE), DILATE BRONCHUS / TRACHEA;  Flexible And Rigid Bronchoscopy and Dilation;  Surgeon: Wilber Lin MD;  [facility]     BRONCHOSCOPY FLEXIBLE N/A 3/13/2018    Procedure: BRONCHOSCOPY FLEXIBLE;  Flexible Bronchoscopy ;  Surgeon: Gissell Sanchez MD;  [facility]     BRONCHOSCOPY FLEXIBLE N/A 5/9/2018    Procedure: BRONCHOSCOPY FLEXIBLE;;  Surgeon: Wilber Lin MD;  [facility]     BRONCHOSCOPY FLEXIBLE AND RIGID N/A 9/10/2018    Procedure: BRONCHOSCOPY FLEXIBLE AND RIGID;  Flexible and Rigid Bronchoscopy with Balloon Dilation, tissue debulking with cryo, and Right mainstem bronchus stent placement;  Surgeon: Wilber Lin MD;  [facility]     BRONCHOSCOPY RIGID N/A 6/6/2018    Procedure: BRONCHOSCOPY RIGID;;  Surgeon: Lopez Maicas MD;  [facility]     BRONCHOSCOPY, DILATE BRONCHUS, STENT BRONCHUS, COMBINED N/A 6/11/2018    Procedure: COMBINED BRONCHOSCOPY, DILATE BRONCHUS, STENT BRONCHUS;  Flexible Bronchoscopy, Balloon Dilation, Bronchial Washings;  Surgeon: Wilber Lin MD;  [facility]     BRONCHOSCOPY, DILATE BRONCHUS, STENT BRONCHUS, COMBINED Right 7/10/2018    Procedure: COMBINED BRONCHOSCOPY, DILATE BRONCHUS, STENT BRONCHUS;  Flexible Bronchoscopy, Balloon Dilation,  Bronchial Washings  ;  Surgeon: Wilber Lin MD;  [facility]     BRONCHOSCOPY, DILATE BRONCHUS, STENT BRONCHUS, COMBINED N/A 8/2/2018    Procedure: COMBINED BRONCHOSCOPY, DILATE BRONCHUS, STENT BRONCHUS;  Flexible Bronchoscopy, Bronchial Washings, Balloon Dilation;  Surgeon: Wilber Lin MD;  [facility]     BRONCHOSCOPY, DILATE BRONCHUS, STENT BRONCHUS, COMBINED N/A 8/20/2018    Procedure: COMBINED BRONCHOSCOPY, DILATE BRONCHUS, STENT BRONCHUS;  Flexible Bronchoscopy, Balloon Dilation;  Surgeon: Wilber Lin MD;  [facility]     BRONCHOSCOPY, DILATE BRONCHUS, STENT BRONCHUS, COMBINED N/A 10/29/2018    Procedure: Flexible Bronchoscopy, Balloon Dilation, Stent Revision, Airway Examination And Therapeutic Suctioning, Cyro Tumor Debulking;  Surgeon: Wilber Lin MD;  [facility]     BRONCHOSCOPY, DILATE BRONCHUS, STENT BRONCHUS, COMBINED N/A 11/20/2018    Procedure: Rigid Bronchoscopy, Stent Removal and dilitation;  Surgeon: Wilber Lin MD;  [facility]     BRONCHOSCOPY, DILATE BRONCHUS, STENT BRONCHUS, COMBINED N/A 12/14/2018    Procedure: Flexible And Rigid Bronchoscopy, Balloon Dilation, Bronchial Washing;  Surgeon: Wilber Lin MD;  [facility]     BRONCHOSCOPY, DILATE BRONCHUS, STENT BRONCHUS, COMBINED N/A 1/17/2019    Procedure: Flexible And Rigid Bronchoscopy, Balloon Dilation.;  Surgeon: Wilber Lin MD;  [facility]     BRONCHOSCOPY, DILATE BRONCHUS, STENT BRONCHUS, COMBINED N/A 3/7/2019    Procedure: Flexible and Rigid Bronchoscopy, Bronchial Washing, Balloon Dilation;  Surgeon: Wilber Lin MD;  [facility]     BRONCHOSCOPY, DILATE BRONCHUS, STENT BRONCHUS, COMBINED N/A 6/6/2019    Procedure: Rigid and Flexible Bronchoscopy, Balloon Dilation;  Surgeon: Wilber Lin MD;  [facility]     BRONCHOSCOPY, DILATE BRONCHUS, STENT BRONCHUS, COMBINED N/A 10/11/2019    Procedure: Flexible and  Rigid Bronchoscopy, Balloon Dilation, Bronchoalveolar Lagave;  Surgeon: Wilber Lin MD;  [facility]     CV RIGHT HEART CATH N/A 3/10/2020    Procedure: CV RIGHT HEART CATH;  Surgeon: Wai Garcia MD;  [facility]     ENT SURGERY      tonsillectomy as a child     ESOPHAGOSCOPY, GASTROSCOPY, DUODENOSCOPY (EGD), COMBINED N/A 10/29/2018    Procedure: COMBINED ESOPHAGOSCOPY, GASTROSCOPY, DUODENOSCOPY (EGD) with biopsies and polypectomy;  Surgeon: Chente Bloom MD;  [facility]     INSERT EXTRACORPORAL MEMBRANE OXYGENATOR ADULT (OUTSIDE OR) N/A 2/27/2018    Procedure: INSERT EXTRACORPORAL MEMBRANE OXYGENATOR ADULT (OUTSIDE OR);  INSERT EXTRACORPORAL MEMBRANE OXYGENATOR ADULT (OUTSIDE OR) ;  Surgeon: Toby Hernandez MD;  [facility]     IR CVC TUNNEL PLACEMENT > 5 YRS OF AGE  10/25/2019     IR PARACENTESIS  1/8/2020     IR THORACENTESIS  9/13/2019     IR TRANSCATHETER BIOPSY  1/8/2020     no prior surgery       REMOVE EXTRACORPORAL MEMBRANE OXYGENATOR ADULT N/A 3/3/2018    Procedure: REMOVE EXTRACORPORAL MEMBRANE OXYGENATOR ADULT;  Removal of Right Femoral Venous and Right Axillary Arterial Extracorporeal Membrane Oxygenator;  Surgeon: Toby Hernandez MD;  Location:  OR     TRANSPLANT LUNG RECIPIENT SINGLE X2 Bilateral 3/1/2018    Procedure: TRANSPLANT LUNG RECIPIENT SINGLE X2;  Median Sternotomy, Extracorporeal Membrane Oxygenator, bilateral sequential lung transplant;  Surgeon: Toby Hernandez MD;  Location:  OR           Family History:     Family History   Problem Relation Age of Onset     Hypertension Mother      Arthritis Mother      Pancreatic Cancer Father      Diabetes Father             Social History:     Social History     Socioeconomic History     Marital status:      Spouse name: Not on file     Number of children: Not on file     Years of education: Not on file     Highest education level: Not on  file   Occupational History     Not on file   Social Needs     Financial resource strain: Not on file     Food insecurity     Worry: Not on file     Inability: Not on file     Transportation needs     Medical: Not on file     Non-medical: Not on file   Tobacco Use     Smoking status: Never Smoker     Smokeless tobacco: Never Used   Substance and Sexual Activity     Alcohol use: No     Alcohol/week: 1.0 standard drinks     Types: 1 Glasses of wine per week     Drug use: No     Sexual activity: Not on file   Lifestyle     Physical activity     Days per week: Not on file     Minutes per session: Not on file     Stress: Not on file   Relationships     Social connections     Talks on phone: Not on file     Gets together: Not on file     Attends Worship service: Not on file     Active member of club or organization: Not on file     Attends meetings of clubs or organizations: Not on file     Relationship status: Not on file     Intimate partner violence     Fear of current or ex partner: Not on file     Emotionally abused: Not on file     Physically abused: Not on file     Forced sexual activity: Not on file   Other Topics Concern     Parent/sibling w/ CABG, MI or angioplasty before 65F 55M? No   Social History Narrative    3/6/2019 - Lives with . Has three daughters. Four grandchildren (two ). No pets. Travelled previously to [facility]. Has visited [place] several times.             Medications:     Current Outpatient Medications   Medication     albuterol (PROAIR HFA/PROVENTIL HFA/VENTOLIN HFA) 108 (90 Base) MCG/ACT inhaler     amLODIPine (NORVASC) 5 MG tablet     B Complex-C-Folic Acid (DIALYVITE) TABS     calcium-vitamin D (CALTRATE) 600-400 MG-UNIT per tablet     carvedilol (COREG) 25 MG tablet     ferrous sulfate (FEROSUL) 325 (65 Fe) MG tablet     fluticasone (FLONASE) 50 MCG/ACT nasal spray     magnesium oxide (MAG-OX) 400 MG tablet     multivitamin, therapeutic with minerals (THERA-VIT-M)  "TABS tablet     pantoprazole (PROTONIX) 40 MG EC tablet     posaconazole (NOXAFIL) 100 MG EC tablet     predniSONE 2.5 MG PO tablet     tacrolimus (GENERIC EQUIVALENT) 0.5 MG capsule     tacrolimus (GENERIC EQUIVALENT) 1 MG capsule     No current facility-administered medications for this visit.             Physical Exam:   /75   Pulse 78   Resp 17   Ht 1.6 m (5' 3\")   Wt 57.6 kg (127 lb)   LMP 06/07/2014 (Exact Date)   SpO2 93%   BMI 22.50 kg/m      GENERAL: alert, NAD  HEENT: NCAT, EOMI, no scleral icterus, oral mucosa moist and without lesions  Neck: no cervical or supraclavicular adenopathy  Lungs: moderate airflow, scattered crackles, decreased in bases  CV: RRR, S1S2, no murmurs noted  Abdomen: normoactive BS, soft, non tender   Lymph: 1+ BLE edema  Neuro: AAO X 3, CN 2-12 grossly intact  Psychiatric: normal affect, good eye contact  Skin: no rash, jaundice or lesions on limited exam         Data:   All laboratory and imaging data reviewed.      Recent Results (from the past 168 hour(s))   CBC with platelets    Collection Time: 12/09/20  7:21 AM   Result Value Ref Range    WBC 5.7 4.0 - 11.0 10e9/L    RBC Count 3.83 3.8 - 5.2 10e12/L    Hemoglobin 11.5 (L) 11.7 - 15.7 g/dL    Hematocrit 37.1 35.0 - 47.0 %    MCV 97 78 - 100 fl    MCH 30.0 26.5 - 33.0 pg    MCHC 31.0 (L) 31.5 - 36.5 g/dL    RDW 15.2 (H) 10.0 - 15.0 %    Platelet Count 156 150 - 450 10e9/L   Magnesium    Collection Time: 12/09/20  7:21 AM   Result Value Ref Range    Magnesium 1.5 (L) 1.6 - 2.3 mg/dL   Basic metabolic panel    Collection Time: 12/09/20  7:21 AM   Result Value Ref Range    Sodium 136 133 - 144 mmol/L    Potassium 4.5 3.4 - 5.3 mmol/L    Chloride 101 94 - 109 mmol/L    Carbon Dioxide 30 20 - 32 mmol/L    Anion Gap 5 3 - 14 mmol/L    Glucose 117 (H) 70 - 99 mg/dL    Urea Nitrogen 36 (H) 7 - 30 mg/dL    Creatinine 3.04 (H) 0.52 - 1.04 mg/dL    GFR Estimate 16 (L) >60 mL/min/[1.73_m2]    GFR Estimate If Black 19 (L) >60 " mL/min/[1.73_m2]    Calcium 8.2 (L) 8.5 - 10.1 mg/dL   General PFT Lab (Please always keep checked)    Collection Time: 12/09/20  7:31 AM   Result Value Ref Range    FVC-Pred 3.26 L    FVC-Pre 1.12 L    FVC-%Pred-Pre 34 %    FEV1-Pre 0.98 L    FEV1-%Pred-Pre 37 %    FEV1FVC-Pred 80 %    FEV1FVC-Pre 87 %    FEFMax-Pred 6.45 L/sec    FEFMax-Pre 3.10 L/sec    FEFMax-%Pred-Pre 48 %    FEF2575-Pred 2.41 L/sec    FEF2575-Pre 1.16 L/sec    HUB8580-%Pred-Pre 48 %    ExpTime-Pre 6.28 sec    FIFMax-Pre 2.61 L/sec    FEV1FEV6-Pred 81 %    FEV1FEV6-Pre 87 %     PFT interpretation:  Maneuver: valid and meets ATS guidelines  Normal ratio with decreased FEV1 and FVC  Compared to prior: FEV1 of 0.98 is 120 ml below prior  Reduction in FVC possibly related to restriction; lung volumes would be necessary to determin

## 2020-12-08 ASSESSMENT — ENCOUNTER SYMPTOMS
DYSPNEA ON EXERTION: 1
COUGH: 1
WHEEZING: 0
SHORTNESS OF BREATH: 1
POSTURAL DYSPNEA: 0
SPUTUM PRODUCTION: 1
HEMOPTYSIS: 0
SNORES LOUDLY: 0
COUGH DISTURBING SLEEP: 1

## 2020-12-09 ENCOUNTER — ANCILLARY PROCEDURE (OUTPATIENT)
Dept: GENERAL RADIOLOGY | Facility: CLINIC | Age: 60
End: 2020-12-09
Attending: PHYSICIAN ASSISTANT
Payer: COMMERCIAL

## 2020-12-09 ENCOUNTER — OFFICE VISIT (OUTPATIENT)
Dept: PULMONOLOGY | Facility: CLINIC | Age: 60
End: 2020-12-09
Attending: PHYSICIAN ASSISTANT
Payer: COMMERCIAL

## 2020-12-09 ENCOUNTER — ANCILLARY PROCEDURE (OUTPATIENT)
Dept: CT IMAGING | Facility: CLINIC | Age: 60
End: 2020-12-09
Attending: PHYSICIAN ASSISTANT
Payer: COMMERCIAL

## 2020-12-09 VITALS
OXYGEN SATURATION: 93 % | HEIGHT: 63 IN | BODY MASS INDEX: 22.5 KG/M2 | WEIGHT: 127 LBS | RESPIRATION RATE: 17 BRPM | HEART RATE: 78 BPM | SYSTOLIC BLOOD PRESSURE: 116 MMHG | DIASTOLIC BLOOD PRESSURE: 75 MMHG

## 2020-12-09 DIAGNOSIS — Z94.2 S/P LUNG TRANSPLANT (H): Primary | ICD-10-CM

## 2020-12-09 DIAGNOSIS — Z94.2 LUNG REPLACED BY TRANSPLANT (H): ICD-10-CM

## 2020-12-09 DIAGNOSIS — Z94.2 LUNG REPLACED BY TRANSPLANT (H): Primary | ICD-10-CM

## 2020-12-09 LAB
6 MIN WALK (FT): 650 FT
6 MIN WALK (M): 198 M
ALBUMIN SERPL-MCNC: 2.9 G/DL (ref 3.4–5)
ALP SERPL-CCNC: 333 U/L (ref 40–150)
ALT SERPL W P-5'-P-CCNC: 55 U/L (ref 0–50)
ANION GAP SERPL CALCULATED.3IONS-SCNC: 5 MMOL/L (ref 3–14)
AST SERPL W P-5'-P-CCNC: 28 U/L (ref 0–45)
BILIRUB DIRECT SERPL-MCNC: 0.5 MG/DL (ref 0–0.2)
BILIRUB SERPL-MCNC: 0.8 MG/DL (ref 0.2–1.3)
BUN SERPL-MCNC: 36 MG/DL (ref 7–30)
CALCIUM SERPL-MCNC: 8.2 MG/DL (ref 8.5–10.1)
CHLORIDE SERPL-SCNC: 101 MMOL/L (ref 94–109)
CO2 SERPL-SCNC: 30 MMOL/L (ref 20–32)
CREAT SERPL-MCNC: 3.04 MG/DL (ref 0.52–1.04)
ERYTHROCYTE [DISTWIDTH] IN BLOOD BY AUTOMATED COUNT: 15.2 % (ref 10–15)
EXPTIME-PRE: 6.28 SEC
FEF2575-%PRED-PRE: 48 %
FEF2575-PRE: 1.16 L/SEC
FEF2575-PRED: 2.41 L/SEC
FEFMAX-%PRED-PRE: 48 %
FEFMAX-PRE: 3.1 L/SEC
FEFMAX-PRED: 6.45 L/SEC
FEV1-%PRED-PRE: 37 %
FEV1-PRE: 0.98 L
FEV1FEV6-PRE: 87 %
FEV1FEV6-PRED: 81 %
FEV1FVC-PRE: 87 %
FEV1FVC-PRED: 80 %
FIFMAX-PRE: 2.61 L/SEC
FIO2-PRE: 21 %
FVC-%PRED-PRE: 34 %
FVC-PRE: 1.12 L
FVC-PRED: 3.26 L
GFR SERPL CREATININE-BSD FRML MDRD: 16 ML/MIN/{1.73_M2}
GLUCOSE SERPL-MCNC: 117 MG/DL (ref 70–99)
HCT VFR BLD AUTO: 37.1 % (ref 35–47)
HGB BLD-MCNC: 11.5 G/DL (ref 11.7–15.7)
MAGNESIUM SERPL-MCNC: 1.5 MG/DL (ref 1.6–2.3)
MCH RBC QN AUTO: 30 PG (ref 26.5–33)
MCHC RBC AUTO-ENTMCNC: 31 G/DL (ref 31.5–36.5)
MCV RBC AUTO: 97 FL (ref 78–100)
PLATELET # BLD AUTO: 156 10E9/L (ref 150–450)
POTASSIUM SERPL-SCNC: 4.5 MMOL/L (ref 3.4–5.3)
PROT SERPL-MCNC: 6.6 G/DL (ref 6.8–8.8)
RBC # BLD AUTO: 3.83 10E12/L (ref 3.8–5.2)
SODIUM SERPL-SCNC: 136 MMOL/L (ref 133–144)
TACROLIMUS BLD-MCNC: 7.5 UG/L (ref 5–15)
TME LAST DOSE: NORMAL H
WBC # BLD AUTO: 5.7 10E9/L (ref 4–11)

## 2020-12-09 PROCEDURE — 71046 X-RAY EXAM CHEST 2 VIEWS: CPT | Mod: GC | Performed by: RADIOLOGY

## 2020-12-09 PROCEDURE — 85027 COMPLETE CBC AUTOMATED: CPT | Performed by: PATHOLOGY

## 2020-12-09 PROCEDURE — 94375 RESPIRATORY FLOW VOLUME LOOP: CPT | Performed by: PHYSICIAN ASSISTANT

## 2020-12-09 PROCEDURE — 80076 HEPATIC FUNCTION PANEL: CPT | Performed by: PATHOLOGY

## 2020-12-09 PROCEDURE — 94618 PULMONARY STRESS TESTING: CPT | Performed by: PHYSICIAN ASSISTANT

## 2020-12-09 PROCEDURE — 71250 CT THORAX DX C-: CPT | Mod: GC | Performed by: RADIOLOGY

## 2020-12-09 PROCEDURE — 87799 DETECT AGENT NOS DNA QUANT: CPT | Mod: 90 | Performed by: PATHOLOGY

## 2020-12-09 PROCEDURE — G0463 HOSPITAL OUTPT CLINIC VISIT: HCPCS | Mod: 25

## 2020-12-09 PROCEDURE — 80048 BASIC METABOLIC PNL TOTAL CA: CPT | Performed by: PATHOLOGY

## 2020-12-09 PROCEDURE — 99214 OFFICE O/P EST MOD 30 MIN: CPT | Mod: 25 | Performed by: PHYSICIAN ASSISTANT

## 2020-12-09 PROCEDURE — 80197 ASSAY OF TACROLIMUS: CPT | Mod: 90 | Performed by: PATHOLOGY

## 2020-12-09 PROCEDURE — 83735 ASSAY OF MAGNESIUM: CPT | Performed by: PATHOLOGY

## 2020-12-09 PROCEDURE — 36415 COLL VENOUS BLD VENIPUNCTURE: CPT | Performed by: PATHOLOGY

## 2020-12-09 ASSESSMENT — PAIN SCALES - GENERAL: PAINLEVEL: NO PAIN (0)

## 2020-12-09 ASSESSMENT — MIFFLIN-ST. JEOR: SCORE: 1130.2

## 2020-12-09 NOTE — PATIENT INSTRUCTIONS
PATIENT INSTRUCTIONS:    1. 6 minute walk test and CT chest today.  2. Referral to pulmonary rehab at [facility]. Will have a coordinator call the hospital and review the COVID protocol.3  3. Continue to hydrate with 60-70 oz fluids daily.  4. Continue to exercise daily or most days of the week.  5. Follow up with your primary care provider for annual gender health maintenance procedures.  6. Follow up with colonoscopy schedule.  7. Follow up with annual dermatology visits.    Thoracic Transplant Office phone [phone], fax [phone]  Office Hours 8:30 - 5:00     For after-hours urgent issues, please dial [phone], option [phone] and ask to speak with the Thoracic Transplant Coordinator  On-Call, pager [phone].  --------------------  To expedite your medication refill(s), please contact your pharmacy and have them fax a refill request to: [phone]  .   *Please allow 3 business days for routine medication refills.  *Please allow 5 business days for controlled substance medication refills.    **For Diabetic medications and supplies refill(s), please contact your pharmacy and have them  Contact your Endocrine team.  --------------------  For scheduling appointments call [facility] :  [phone]. For lab appointments call [phone] or [facility].  --------------------  Please Note: If you are active on "[employer] all future test results will be sent by "[employer] message only, and will no longer be called to patient. You may also receive communication directly from your physician.    
I personally performed the service described in the documentation recorded by the scribe in my presence, and it accurately and completely records my words and actions.

## 2020-12-09 NOTE — RESULT ENCOUNTER NOTE
Tacrolimus level 7.5 at 12 hours, on 12/9/20.  Goal 8-10.   Current dose 1 mg in AM, 0.5 mg in PM    No change close to goal lets recheck next week    [employer] message sent   [employer] message sent

## 2020-12-09 NOTE — LETTER
"    12/9/2020         RE: Kecia Blue  [address] [place] [address]        Dear Colleague,    Thank you for referring your patient, Kecia Blue, to the [facility] [place]. Please see a copy of my visit note below.    [facility]  December 8, 2020         Assessment and Plan:   Kecia Blue is a 59 year old female with a h/o of bilateral lung transplant on 3/1/18 for ILD associated with connective tissue disease. 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      1. S/p bilateral lung transplant: overall improved, less cough, no fever or chills. No dyspnea at rest, notes she does get dyspneic with activity and wonders if some of her symptoms are related to deconditioning. Sating 93% on room air. DSA 2/19/20 negative. CXR reviewed by me today demonstrates slight worsening of left basilar opacities and improvement in right effusion. PFTs today down 5% from prior, within her recent baseline, but down 9% over the last year.  - CT chest today with expiratory views   - 6 MWT today to assess need for O2 with activity  - Continue two drug IS including tacrolimus (goal 80-10) and prednisone (for recurrent infections)   - PCP prophylaxis discontinued for CD4 count > 200 (7/28)  - Pulmonary rehab referral     2. Right mainstem bronchial stenosis: s/p dilation, most recently 3/19.   - Monitor     3. Ascites, elevated alk phos and portal hypertension: initially thought to be related to chronic elevated right-sided heart pressures on liver biopsy with pre-lung transplant PA presssure of 98/45, mean 61 mm hg. However, right heart cath on 3/10/20 showed only mild PAH. Possibly related to \"residual\" disease\" from prior congestion with decompensation, at which time she " develped ascites. Alk phos elevation likely secondary to her azole use, stable.   - LFTs added on today  - Following with Hepatology     4. H/o left-sided aspergillus empyema: first noted on 10/08/2019. CT scan repeated on 7/17/20 showed increased mass-like density, likely pleural-based, in left lower lung area s/p needle aspiration on 8/13/20, now + for aspergillus fumigatus s/p intrapleural bead placement (amphoterecin) on 11/20. Following with ID.  - Continue posaconazole indefinitely  - LFTs monitoring per above  - Follow up with ID     5. H/o CMV viremia: last CMV on 11/4 negative.   - Pending for today     6. H/o EBV viremia: last level of 2935 on 9/9/20.  - EBV pending for today     7. Dialysis-dependent renal failure: changed from 3 times/week HD to to 2 times/week HD on M/Th. Hoping to start dialyzing through her left arm loop graft. Will determine plan for renal transplant work up once we have a plan for the aspergillus in place.      8. HTN: controlled.  - Continue amlodipine and carvedilol    9. Hypomagnesemia: Mg of 1.5, missed about 3 days recently because she was out of medications.   - Monitor     RTC: 3 months in person  Influenza and other vaccinations: influenza at dialysis on Thursday per Kecia  Annual dermatology visit:     Ame Chow PA-C  Pulmonary, Allergy, Critical Care and Sleep Medicine        Interval History:     Overall, is feeling well, however, noticed when she does activities she is short of breath. But she is still in the 90s with her sats. Wonders if she is no getting enough activity in her life. No shortness of breath at rest. No fever or chills, cough has improved overall, not only in the mornings after Kecia wakes up. Got a new bed with an elevated head and it's helping her. No chest pain or palpitations. No bloating or gas, stools are formed and once/day.           Review of Systems:   Please see HPI, otherwise the complete 10 point ROS is negative.           Past Medical and  Surgical History:     Past Medical History:   Diagnosis Date     Abdominal pain 10/6/2019     Acute on chronic respiratory failure with hypoxia (H) 2/21/2018     Antisynthetase syndrome (H) 2014     Chronic cough      Chronic infection     recent C diff  8/18     Dehydration 8/1/2018     Dehydration 8/1/2018     Dermatomyositis (H)      Dysplasia of cervix, low grade (ESTRADA 1)      ILD (interstitial lung disease) (H)      Nausea and vomiting 12/4/2018     Osteopenia      PONV (postoperative nausea and vomiting)      Pulmonary hypertension (H)      Raynaud's disease      Seronegative rheumatoid arthritis (H)      Past Surgical History:   Procedure Laterality Date     BRONCHOSCOPY (RIGID OR FLEXIBLE), DIAGNOSTIC N/A 4/10/2018    Procedure: COMBINED BRONCHOSCOPY (RIGID OR FLEXIBLE), LAVAGE;;  Surgeon: Mariposa Donohue MD;  Location: [facility]     BRONCHOSCOPY (RIGID OR FLEXIBLE), DILATE BRONCHUS / TRACHEA N/A 10/11/2018    Procedure: BRONCHOSCOPY (RIGID OR FLEXIBLE), DILATE BRONCHUS / TRACHEA;  Flexible And Rigid Bronchoscopy and Dilation;  Surgeon: Wilber Lin MD;  [facility]     BRONCHOSCOPY FLEXIBLE N/A 3/13/2018    Procedure: BRONCHOSCOPY FLEXIBLE;  Flexible Bronchoscopy ;  Surgeon: Gissell Sanchez MD;  Location: [facility]     BRONCHOSCOPY FLEXIBLE N/A 5/9/2018    Procedure: BRONCHOSCOPY FLEXIBLE;;  Surgeon: Wilber Lin MD;  [facility]     BRONCHOSCOPY FLEXIBLE AND RIGID N/A 9/10/2018    Procedure: BRONCHOSCOPY FLEXIBLE AND RIGID;  Flexible and Rigid Bronchoscopy with Balloon Dilation, tissue debulking with cryo, and Right mainstem bronchus stent placement;  Surgeon: Wilber Lin MD;  [facility]     BRONCHOSCOPY RIGID N/A 6/6/2018    Procedure: BRONCHOSCOPY RIGID;;  Surgeon: Lopez Macias MD;  [facility]     BRONCHOSCOPY, DILATE BRONCHUS, STENT BRONCHUS, COMBINED N/A 6/11/2018    Procedure: COMBINED BRONCHOSCOPY, DILATE BRONCHUS, STENT BRONCHUS;  Flexible  Bronchoscopy, Balloon Dilation, Bronchial Washings;  Surgeon: Wilber Lin MD;  [facility]     BRONCHOSCOPY, DILATE BRONCHUS, STENT BRONCHUS, COMBINED Right 7/10/2018    Procedure: COMBINED BRONCHOSCOPY, DILATE BRONCHUS, STENT BRONCHUS;  Flexible Bronchoscopy, Balloon Dilation, Bronchial Washings  ;  Surgeon: Wilber Lin MD;  [facility]     BRONCHOSCOPY, DILATE BRONCHUS, STENT BRONCHUS, COMBINED N/A 8/2/2018    Procedure: COMBINED BRONCHOSCOPY, DILATE BRONCHUS, STENT BRONCHUS;  Flexible Bronchoscopy, Bronchial Washings, Balloon Dilation;  Surgeon: Wilber Lin MD;  [facility]     BRONCHOSCOPY, DILATE BRONCHUS, STENT BRONCHUS, COMBINED N/A 8/20/2018    Procedure: COMBINED BRONCHOSCOPY, DILATE BRONCHUS, STENT BRONCHUS;  Flexible Bronchoscopy, Balloon Dilation;  Surgeon: Wilber Lin MD;  [facility]     BRONCHOSCOPY, DILATE BRONCHUS, STENT BRONCHUS, COMBINED N/A 10/29/2018    Procedure: Flexible Bronchoscopy, Balloon Dilation, Stent Revision, Airway Examination And Therapeutic Suctioning, Cyro Tumor Debulking;  Surgeon: Wilber Lin MD;  [facility]     BRONCHOSCOPY, DILATE BRONCHUS, STENT BRONCHUS, COMBINED N/A 11/20/2018    Procedure: Rigid Bronchoscopy, Stent Removal and dilitation;  Surgeon: Wilber Lin MD;  [facility]     BRONCHOSCOPY, DILATE BRONCHUS, STENT BRONCHUS, COMBINED N/A 12/14/2018    Procedure: Flexible And Rigid Bronchoscopy, Balloon Dilation, Bronchial Washing;  Surgeon: Wilber Lin MD;  [facility]     BRONCHOSCOPY, DILATE BRONCHUS, STENT BRONCHUS, COMBINED N/A 1/17/2019    Procedure: Flexible And Rigid Bronchoscopy, Balloon Dilation.;  Surgeon: Wilber Lin MD;  [facility]     BRONCHOSCOPY, DILATE BRONCHUS, STENT BRONCHUS, COMBINED N/A 3/7/2019    Procedure: Flexible and Rigid Bronchoscopy, Bronchial Washing, Balloon Dilation;  Surgeon: Wilber Lin MD;  [facility]      BRONCHOSCOPY, DILATE BRONCHUS, STENT BRONCHUS, COMBINED N/A 6/6/2019    Procedure: Rigid and Flexible Bronchoscopy, Balloon Dilation;  Surgeon: Wilber Lin MD;  [facility]     BRONCHOSCOPY, DILATE BRONCHUS, STENT BRONCHUS, COMBINED N/A 10/11/2019    Procedure: Flexible and Rigid Bronchoscopy, Balloon Dilation, Bronchoalveolar Lagave;  Surgeon: Wilber Lin MD;  [facility]     CV RIGHT HEART CATH N/A 3/10/2020    Procedure: CV RIGHT HEART CATH;  Surgeon: Wai Garcia MD;  [facility]     ENT SURGERY      tonsillectomy as a child     ESOPHAGOSCOPY, GASTROSCOPY, DUODENOSCOPY (EGD), COMBINED N/A 10/29/2018    Procedure: COMBINED ESOPHAGOSCOPY, GASTROSCOPY, DUODENOSCOPY (EGD) with biopsies and polypectomy;  Surgeon: Chente Bloom MD;  [facility]     INSERT EXTRACORPORAL MEMBRANE OXYGENATOR ADULT (OUTSIDE OR) N/A 2/27/2018    Procedure: INSERT EXTRACORPORAL MEMBRANE OXYGENATOR ADULT (OUTSIDE OR);  INSERT EXTRACORPORAL MEMBRANE OXYGENATOR ADULT (OUTSIDE OR) ;  Surgeon: Toby Hernandez MD;  [facility]     IR CVC TUNNEL PLACEMENT > 5 YRS OF AGE  10/25/2019     IR PARACENTESIS  1/8/2020     IR THORACENTESIS  9/13/2019     IR TRANSCATHETER BIOPSY  1/8/2020     no prior surgery       REMOVE EXTRACORPORAL MEMBRANE OXYGENATOR ADULT N/A 3/3/2018    Procedure: REMOVE EXTRACORPORAL MEMBRANE OXYGENATOR ADULT;  Removal of Right Femoral Venous and Right Axillary Arterial Extracorporeal Membrane Oxygenator;  Surgeon: Toby Hernandez MD;  [facility]     TRANSPLANT LUNG RECIPIENT SINGLE X2 Bilateral 3/1/2018    Procedure: TRANSPLANT LUNG RECIPIENT SINGLE X2;  Median Sternotomy, Extracorporeal Membrane Oxygenator, bilateral sequential lung transplant;  Surgeon: Toby Hernandez MD;  [facility]           Family History:     Family History   Problem Relation Age of Onset     Hypertension Mother      Arthritis  Mother      Pancreatic Cancer Father      Diabetes Father             Social History:     Social History     Socioeconomic History     Marital status:      Spouse name: Not on file     Number of children: Not on file     Years of education: Not on file     Highest education level: Not on file   Occupational History     Not on file   Social Needs     Financial resource strain: Not on file     Food insecurity     Worry: Not on file     Inability: Not on file     Transportation needs     Medical: Not on file     Non-medical: Not on file   Tobacco Use     Smoking status: Never Smoker     Smokeless tobacco: Never Used   Substance and Sexual Activity     Alcohol use: No     Alcohol/week: 1.0 standard drinks     Types: 1 Glasses of wine per week     Drug use: No     Sexual activity: Not on file   Lifestyle     Physical activity     Days per week: Not on file     Minutes per session: Not on file     Stress: Not on file   Relationships     Social connections     Talks on phone: Not on file     Gets together: Not on file     Attends Yazdanism service: Not on file     Active member of club or organization: Not on file     Attends meetings of clubs or organizations: Not on file     Relationship status: Not on file     Intimate partner violence     Fear of current or ex partner: Not on file     Emotionally abused: Not on file     Physically abused: Not on file     Forced sexual activity: Not on file   Other Topics Concern     Parent/sibling w/ CABG, MI or angioplasty before 65F 55M? No   Social History Narrative    3/6/2019 - Lives with . Has three daughters. Four grandchildren (two ). No pets. Travelled previously to [facility]. Has visited [place] several times.             Medications:     Current Outpatient Medications   Medication     albuterol (PROAIR HFA/PROVENTIL HFA/VENTOLIN HFA) 108 (90 Base) MCG/ACT inhaler     amLODIPine (NORVASC) 5 MG tablet     B Complex-C-Folic Acid (DIALYVITE) TABS      "calcium-vitamin D (CALTRATE) 600-400 MG-UNIT per tablet     carvedilol (COREG) 25 MG tablet     ferrous sulfate (FEROSUL) 325 (65 Fe) MG tablet     fluticasone (FLONASE) 50 MCG/ACT nasal spray     magnesium oxide (MAG-OX) 400 MG tablet     multivitamin, therapeutic with minerals (THERA-VIT-M) TABS tablet     pantoprazole (PROTONIX) 40 MG EC tablet     posaconazole (NOXAFIL) 100 MG EC tablet     predniSONE 2.5 MG PO tablet     tacrolimus (GENERIC EQUIVALENT) 0.5 MG capsule     tacrolimus (GENERIC EQUIVALENT) 1 MG capsule     No current facility-administered medications for this visit.             Physical Exam:   /75   Pulse 78   Resp 17   Ht 1.6 m (5' 3\")   Wt 57.6 kg (127 lb)   LMP 06/07/2014 (Exact Date)   SpO2 93%   BMI 22.50 kg/m      GENERAL: alert, NAD  HEENT: NCAT, EOMI, no scleral icterus, oral mucosa moist and without lesions  Neck: no cervical or supraclavicular adenopathy  Lungs: moderate airflow, scattered crackles, decreased in bases  CV: RRR, S1S2, no murmurs noted  Abdomen: normoactive BS, soft, non tender   Lymph: 1+ BLE edema  Neuro: AAO X 3, CN 2-12 grossly intact  Psychiatric: normal affect, good eye contact  Skin: no rash, jaundice or lesions on limited exam         Data:   All laboratory and imaging data reviewed.      Recent Results (from the past 168 hour(s))   CBC with platelets    Collection Time: 12/09/20  7:21 AM   Result Value Ref Range    WBC 5.7 4.0 - 11.0 10e9/L    RBC Count 3.83 3.8 - 5.2 10e12/L    Hemoglobin 11.5 (L) 11.7 - 15.7 g/dL    Hematocrit 37.1 35.0 - 47.0 %    MCV 97 78 - 100 fl    MCH 30.0 26.5 - 33.0 pg    MCHC 31.0 (L) 31.5 - 36.5 g/dL    RDW 15.2 (H) 10.0 - 15.0 %    Platelet Count 156 150 - 450 10e9/L   Magnesium    Collection Time: 12/09/20  7:21 AM   Result Value Ref Range    Magnesium 1.5 (L) 1.6 - 2.3 mg/dL   Basic metabolic panel    Collection Time: 12/09/20  7:21 AM   Result Value Ref Range    Sodium 136 133 - 144 mmol/L    Potassium 4.5 3.4 - 5.3 " mmol/L    Chloride 101 94 - 109 mmol/L    Carbon Dioxide 30 20 - 32 mmol/L    Anion Gap 5 3 - 14 mmol/L    Glucose 117 (H) 70 - 99 mg/dL    Urea Nitrogen 36 (H) 7 - 30 mg/dL    Creatinine 3.04 (H) 0.52 - 1.04 mg/dL    GFR Estimate 16 (L) >60 mL/min/[1.73_m2]    GFR Estimate If Black 19 (L) >60 mL/min/[1.73_m2]    Calcium 8.2 (L) 8.5 - 10.1 mg/dL   General PFT Lab (Please always keep checked)    Collection Time: 12/09/20  7:31 AM   Result Value Ref Range    FVC-Pred 3.26 L    FVC-Pre 1.12 L    FVC-%Pred-Pre 34 %    FEV1-Pre 0.98 L    FEV1-%Pred-Pre 37 %    FEV1FVC-Pred 80 %    FEV1FVC-Pre 87 %    FEFMax-Pred 6.45 L/sec    FEFMax-Pre 3.10 L/sec    FEFMax-%Pred-Pre 48 %    FEF2575-Pred 2.41 L/sec    FEF2575-Pre 1.16 L/sec    QGW6585-%Pred-Pre 48 %    ExpTime-Pre 6.28 sec    FIFMax-Pre 2.61 L/sec    FEV1FEV6-Pred 81 %    FEV1FEV6-Pre 87 %     PFT interpretation:  Maneuver: valid and meets ATS guidelines  Normal ratio with decreased FEV1 and FVC  Compared to prior: FEV1 of 0.98 is 120 ml below prior  Reduction in FVC possibly related to restriction; lung volumes would be necessary to determin      Again, thank you for allowing me to participate in the care of your patient.        Sincerely,        Ame Chow PA-C

## 2020-12-09 NOTE — NURSING NOTE
Chief Complaint   Patient presents with     RECHECK     Return lung TX     Medications reviewed and vital signs taken.   Yohana Valencia, CMA

## 2020-12-10 ENCOUNTER — TELEPHONE (OUTPATIENT)
Dept: TRANSPLANT | Facility: CLINIC | Age: 60
End: 2020-12-10

## 2020-12-10 LAB
CMV DNA SPEC NAA+PROBE-ACNC: <137 [IU]/ML
CMV DNA SPEC NAA+PROBE-LOG#: <2.1 {LOG_IU}/ML
EBV DNA # SPEC NAA+PROBE: ABNORMAL {COPIES}/ML
EBV DNA SPEC NAA+PROBE-LOG#: 4 {LOG_COPIES}/ML
SPECIMEN SOURCE: ABNORMAL

## 2020-12-10 NOTE — TELEPHONE ENCOUNTER
Per Ame Chow will establish pulmonary rehab at [facility] in [facility]. Spoke with Airam in pulmonary rehab and she reports their program remains open and I faxed orders to [phone].

## 2020-12-10 NOTE — LETTER
PHYSICIAN ORDERS      DATE & TIME ISSUED: December 10, 2020 2:29 PM  PATIENT NAME: Kecia Blue   : 1960     [facility] MR# [if applicable]: [id number]     DIAGNOSIS:  Lung Transplant  Z94.2, Deconditioning  Patient is post lung transplant in 2018 and now is deconditioned. Please call patient to schedule assessment and program for pulmonary rehabilitation.    Any questions please call: [phone]           Ame Chow

## 2020-12-14 ENCOUNTER — TELEPHONE (OUTPATIENT)
Dept: TRANSPLANT | Facility: CLINIC | Age: 60
End: 2020-12-14

## 2020-12-14 NOTE — TELEPHONE ENCOUNTER
Waiting for response from Ame Chow and ID team regarding most recent chest CT results and what the plan is. I let patient know that I would get back to her when we have a plan in place. Patient  agreeable.

## 2020-12-17 ENCOUNTER — TELEPHONE (OUTPATIENT)
Dept: TRANSPLANT | Facility: CLINIC | Age: 60
End: 2020-12-17

## 2020-12-17 DIAGNOSIS — I10 ESSENTIAL HYPERTENSION: ICD-10-CM

## 2020-12-17 DIAGNOSIS — Z94.2 LUNG REPLACED BY TRANSPLANT (H): ICD-10-CM

## 2020-12-17 PROCEDURE — 80197 ASSAY OF TACROLIMUS: CPT | Performed by: PHYSICIAN ASSISTANT

## 2020-12-17 RX ORDER — CARVEDILOL 25 MG/1
25 TABLET ORAL 2 TIMES DAILY WITH MEALS
Qty: 60 TABLET | Refills: 11 | Status: ON HOLD | OUTPATIENT
Start: 2020-12-17 | End: 2021-02-25

## 2020-12-17 NOTE — TELEPHONE ENCOUNTER
.  DATE:  12/17/2020   TIME OF RECEIPT FROM LAB:  12:39 pm  LAB TEST:  Alk Phos  LAB VALUE:  307  RESULTS GIVEN WITH READ-BACK TO (PROVIDER):  Ranjan Randall  TIME LAB VALUE REPORTED TO PROVIDER:   12:46 pm

## 2020-12-17 NOTE — TELEPHONE ENCOUNTER
FYI - liver panel results  Alk phos trending down and ALT up slightly  We will continue to monitor.  Have forwarded to team for further review/recommendations  Results for SARAI KILLIAN (MRN [id number]) as of 12/17/2020 12:59   Ref. Range 11/4/2020 09:07 12/9/2020 07:21   Alkaline Phosphatase Latest Ref Range: 40 - 150 U/L  333 (H)   Alk Phosphatase (External) Latest Ref Range: 40 - 150 U/L 429 (HH)    ALT Latest Ref Range: 0 - 50 U/L  55 (H)   ALT (External) Latest Ref Range: 0 - 55 U/L 36    AST Latest Ref Range: 0 - 45 U/L  28   AST (External) Latest Ref Range: 5 - 34 U/L 27    Bilirubin Direct Latest Ref Range: 0.0 - 0.2 mg/dL  0.5 (H)   Bilirubin Total (External) Latest Ref Range: 0.2 - 1.2 mg/dL 0.8

## 2020-12-18 ENCOUNTER — TELEPHONE (OUTPATIENT)
Dept: TRANSPLANT | Facility: CLINIC | Age: 60
End: 2020-12-18

## 2020-12-18 DIAGNOSIS — Z11.59 ENCOUNTER FOR SCREENING FOR OTHER VIRAL DISEASES: Primary | ICD-10-CM

## 2020-12-18 DIAGNOSIS — Z94.2 LUNG REPLACED BY TRANSPLANT (H): Primary | ICD-10-CM

## 2020-12-18 LAB
FUNGUS SPEC CULT: NORMAL
SPECIMEN SOURCE: NORMAL

## 2020-12-18 NOTE — LETTER
PHYSICIAN ORDERS      DATE & TIME ISSUED: 2020 11:33 AM  PATIENT NAME: Kecia Blue   : 1960     [facility] MR# [if applicable]: [id number]     DIAGNOSIS:  Lung Transplant  Z94.2   Pre procedure COVID 19 testing for asymptomatic patient.  Please use nasal pharyngeal swab test on 20     Any questions please call:     Please fax these results to [phone].        Ame Chow

## 2020-12-18 NOTE — TELEPHONE ENCOUNTER
Per Dr. Mcelroy and team will set pateint up for Bronchoscopy inspection and with BAL of RML/RLL  On 12-23-20 at 1000, patient to arrive at [facility] waiting room for labs at 0830 and endoscopy at 0900.  Covid 19 testing to be completed [facility] in [place] phone [phone]  Patient denies using aspirin or anticoagulants.  Will instruct patient to be NPO after midnight the night before procedure.

## 2020-12-21 ENCOUNTER — VIRTUAL VISIT (OUTPATIENT)
Dept: GASTROENTEROLOGY | Facility: CLINIC | Age: 60
End: 2020-12-21
Attending: COUNSELOR
Payer: COMMERCIAL

## 2020-12-21 DIAGNOSIS — R79.89 ELEVATED LIVER FUNCTION TESTS: Primary | ICD-10-CM

## 2020-12-21 LAB
TACROLIMUS BLD-MCNC: 9.8 UG/L (ref 5–15)
TME LAST DOSE: NORMAL H

## 2020-12-21 PROCEDURE — 99214 OFFICE O/P EST MOD 30 MIN: CPT | Mod: 95 | Performed by: INTERNAL MEDICINE

## 2020-12-21 ASSESSMENT — PAIN SCALES - GENERAL: PAINLEVEL: NO PAIN (0)

## 2020-12-21 NOTE — PROGRESS NOTES
"Kecia Blue is a 60 year old female who is being evaluated via a billable video visit.      The patient has been notified of following:     \"This video visit will be conducted via a call between you and your physician/provider. We have found that certain health care needs can be provided without the need for an in-person physical exam.  This service lets us provide the care you need with a video conversation.  If a prescription is necessary we can send it directly to your pharmacy.  If lab work is needed we can place an order for that and you can then stop by our lab to have the test done at a later time.    Video visits are billed at different rates depending on your insurance coverage.  Please reach out to your insurance provider with any questions.    If during the course of the call the physician/provider feels a video visit is not appropriate, you will not be charged for this service.\"    Patient has given verbal consent for Video visit? Yes  How would you like to obtain your AVS? MyChart  If you are dropped from the video visit, the video invite should be resent to: Text to cell phone: [phone]  Will anyone else be joining your video visit? No      [facility]    Hepatology follow-up    CONSULTING HEALTHCARE PROFESSIONAL:  Rosy Vu MD      REASON FOR CONSULTATION:  New onset ascites.      HISTORY OF PRESENT ILLNESS:  Ms. Blue is a 60-year-old female with history significant for hypertension, end-stage kidney disease on dialysis, interstitial lung disease with antisynthetase syndrome who had a bilateral lung transplant in 03/2018.  We are seeing her for portal hypertension and new onset ascites.  We had done a workup which included ascitic fluid analysis that showed SAAG of more than 1.1, which was compatible with portal hypertension.  She also got a liver biopsy and this showed congestive hepatopathy with moderate zone 3 pericellular fibrosis without any " bridging fibrosis.  Portal pressures were also elevated.      Anyway, since I last saw her, she did relatively well with optimal dialysis and optimization of her lung function.  The ascites, according to the patient, is longer present.  She does not have any edema, also denies any other signs of chronic liver disease and that includes jaundice, lower extremity edema, abdominal distention, lethargy or confusion.  She also denies any gastrointestinal bleeding like melena, hematemesis or hematochezia.  She has no abdominal pain, nausea or vomiting.  She is moving her bowels regularly.  Denies any fever.  She had COVID tested when she was having a pulmonary lavage, and at that time, she was negative.  She has some cough, but no shortness of breath.     Medical hx Surgical hx   Past Medical History:   Diagnosis Date     Abdominal pain 10/6/2019     Acute on chronic respiratory failure with hypoxia (H) 2/21/2018     Antisynthetase syndrome (H) 2014     Chronic cough      Chronic infection     recent C diff  8/18     Dehydration 8/1/2018     Dehydration 8/1/2018     Dermatomyositis (H)      Dysplasia of cervix, low grade (ESTRADA 1)      ILD (interstitial lung disease) (H)      Nausea and vomiting 12/4/2018     Osteopenia      PONV (postoperative nausea and vomiting)      Pulmonary hypertension (H)      Raynaud's disease      Seronegative rheumatoid arthritis (H)       Past Surgical History:   Procedure Laterality Date     BRONCHOSCOPY (RIGID OR FLEXIBLE), DIAGNOSTIC N/A 4/10/2018    Procedure: COMBINED BRONCHOSCOPY (RIGID OR FLEXIBLE), LAVAGE;;  Surgeon: Mariposa Donohue MD;  [facility]     BRONCHOSCOPY (RIGID OR FLEXIBLE), DILATE BRONCHUS / TRACHEA N/A 10/11/2018    Procedure: BRONCHOSCOPY (RIGID OR FLEXIBLE), DILATE BRONCHUS / TRACHEA;  Flexible And Rigid Bronchoscopy and Dilation;  Surgeon: Wilber Lin MD;  [facility]     BRONCHOSCOPY FLEXIBLE N/A 3/13/2018    Procedure: BRONCHOSCOPY FLEXIBLE;   Flexible Bronchoscopy ;  Surgeon: Gissell Sanchez MD;  [facility]     BRONCHOSCOPY FLEXIBLE N/A 5/9/2018    Procedure: BRONCHOSCOPY FLEXIBLE;;  Surgeon: Wilber Lin MD;  [facility]     BRONCHOSCOPY FLEXIBLE AND RIGID N/A 9/10/2018    Procedure: BRONCHOSCOPY FLEXIBLE AND RIGID;  Flexible and Rigid Bronchoscopy with Balloon Dilation, tissue debulking with cryo, and Right mainstem bronchus stent placement;  Surgeon: Wilber Lin MD;  [facility]     BRONCHOSCOPY RIGID N/A 6/6/2018    Procedure: BRONCHOSCOPY RIGID;;  Surgeon: Lopez Macias MD;  [facility]     BRONCHOSCOPY, DILATE BRONCHUS, STENT BRONCHUS, COMBINED N/A 6/11/2018    Procedure: COMBINED BRONCHOSCOPY, DILATE BRONCHUS, STENT BRONCHUS;  Flexible Bronchoscopy, Balloon Dilation, Bronchial Washings;  Surgeon: Wilber Lin MD;  [facility]     BRONCHOSCOPY, DILATE BRONCHUS, STENT BRONCHUS, COMBINED Right 7/10/2018    Procedure: COMBINED BRONCHOSCOPY, DILATE BRONCHUS, STENT BRONCHUS;  Flexible Bronchoscopy, Balloon Dilation, Bronchial Washings  ;  Surgeon: Wilber Lin MD;  [facility]     BRONCHOSCOPY, DILATE BRONCHUS, STENT BRONCHUS, COMBINED N/A 8/2/2018    Procedure: COMBINED BRONCHOSCOPY, DILATE BRONCHUS, STENT BRONCHUS;  Flexible Bronchoscopy, Bronchial Washings, Balloon Dilation;  Surgeon: Wilber Lin MD;  [facility]     BRONCHOSCOPY, DILATE BRONCHUS, STENT BRONCHUS, COMBINED N/A 8/20/2018    Procedure: COMBINED BRONCHOSCOPY, DILATE BRONCHUS, STENT BRONCHUS;  Flexible Bronchoscopy, Balloon Dilation;  Surgeon: Wilber Lin MD;  [facility]     BRONCHOSCOPY, DILATE BRONCHUS, STENT BRONCHUS, COMBINED N/A 10/29/2018    Procedure: Flexible Bronchoscopy, Balloon Dilation, Stent Revision, Airway Examination And Therapeutic Suctioning, Cyro Tumor Debulking;  Surgeon: Wilber Lin MD;  [facility]     BRONCHOSCOPY, DILATE BRONCHUS, STENT  BRONCHUS, COMBINED N/A 11/20/2018    Procedure: Rigid Bronchoscopy, Stent Removal and dilitation;  Surgeon: Wilber Lin MD;  [facility]     BRONCHOSCOPY, DILATE BRONCHUS, STENT BRONCHUS, COMBINED N/A 12/14/2018    Procedure: Flexible And Rigid Bronchoscopy, Balloon Dilation, Bronchial Washing;  Surgeon: Wilber Lin MD;  [facility]     BRONCHOSCOPY, DILATE BRONCHUS, STENT BRONCHUS, COMBINED N/A 1/17/2019    Procedure: Flexible And Rigid Bronchoscopy, Balloon Dilation.;  Surgeon: Wilber Lin MD;  [facility]     BRONCHOSCOPY, DILATE BRONCHUS, STENT BRONCHUS, COMBINED N/A 3/7/2019    Procedure: Flexible and Rigid Bronchoscopy, Bronchial Washing, Balloon Dilation;  Surgeon: Wilber Lin MD;  [facility]     BRONCHOSCOPY, DILATE BRONCHUS, STENT BRONCHUS, COMBINED N/A 6/6/2019    Procedure: Rigid and Flexible Bronchoscopy, Balloon Dilation;  Surgeon: Wilber Lin MD;  [facility]     BRONCHOSCOPY, DILATE BRONCHUS, STENT BRONCHUS, COMBINED N/A 10/11/2019    Procedure: Flexible and Rigid Bronchoscopy, Balloon Dilation, Bronchoalveolar Lagave;  Surgeon: Wilber Lin MD;  [facility]     CV RIGHT HEART CATH N/A 3/10/2020    Procedure: CV RIGHT HEART CATH;  Surgeon: Wai Garcia MD;  [facility]     ENT SURGERY      tonsillectomy as a child     ESOPHAGOSCOPY, GASTROSCOPY, DUODENOSCOPY (EGD), COMBINED N/A 10/29/2018    Procedure: COMBINED ESOPHAGOSCOPY, GASTROSCOPY, DUODENOSCOPY (EGD) with biopsies and polypectomy;  Surgeon: Chente Bloom MD;  [facility]     INSERT EXTRACORPORAL MEMBRANE OXYGENATOR ADULT (OUTSIDE OR) N/A 2/27/2018    Procedure: INSERT EXTRACORPORAL MEMBRANE OXYGENATOR ADULT (OUTSIDE OR);  INSERT EXTRACORPORAL MEMBRANE OXYGENATOR ADULT (OUTSIDE OR) ;  Surgeon: Toby Hernandez MD;  [facility]     IR CVC TUNNEL PLACEMENT > 5 YRS OF AGE  10/25/2019     IR  PARACENTESIS  1/8/2020     IR THORACENTESIS  9/13/2019     IR TRANSCATHETER BIOPSY  1/8/2020     no prior surgery       REMOVE EXTRACORPORAL MEMBRANE OXYGENATOR ADULT N/A 3/3/2018    Procedure: REMOVE EXTRACORPORAL MEMBRANE OXYGENATOR ADULT;  Removal of Right Femoral Venous and Right Axillary Arterial Extracorporeal Membrane Oxygenator;  Surgeon: Toby Hernandez MD;  [facility]     TRANSPLANT LUNG RECIPIENT SINGLE X2 Bilateral 3/1/2018    Procedure: TRANSPLANT LUNG RECIPIENT SINGLE X2;  Median Sternotomy, Extracorporeal Membrane Oxygenator, bilateral sequential lung transplant;  Surgeon: Toby Hernandez MD;  [facility]          Medications  Prior to Admission medications    Medication Sig Start Date End Date Taking? Authorizing Provider   albuterol (PROAIR HFA/PROVENTIL HFA/VENTOLIN HFA) 108 (90 Base) MCG/ACT inhaler Inhale 2 puffs into the lungs every 4 hours as needed for shortness of breath / dyspnea or wheezing 9/9/20  Yes Ame Chow PA-C   amLODIPine (NORVASC) 5 MG tablet Take 2 tablets (10 mg) by mouth daily 8/18/20  Yes Srinivas Mcelroy MD   B Complex-C-Folic Acid (DIALYVITE) TABS Take 1 tablet by mouth daily 7/12/20  Yes Reported, Patient   calcium-vitamin D (CALTRATE) 600-400 MG-UNIT per tablet Take 1 tablet by mouth daily 8/1/18  Yes Ame Chow PA-C   carvedilol (COREG) 25 MG tablet Take 1 tablet (25 mg) by mouth 2 times daily (with meals) 12/17/20  Yes Srinivas Mcelroy MD   ferrous sulfate (FEROSUL) 325 (65 Fe) MG tablet Take 1 tablet (325 mg) by mouth daily (with breakfast) 9/15/19  Yes Christelle Lopez MD   fluticasone (FLONASE) 50 MCG/ACT nasal spray Spray 1 spray into both nostrils daily 10/6/20  Yes Ame Chow PA-C   magnesium oxide (MAG-OX) 400 MG tablet Take 400 mg by mouth daily    Yes Unknown, Entered By History   multivitamin, therapeutic with minerals (THERA-VIT-M) TABS tablet Take 1 tablet by mouth daily  5/24/18  Yes Alex Hale MD   pantoprazole (PROTONIX) 40 MG EC tablet Take 1 tablet (40 mg) by mouth daily 10/9/20  Yes Srinivas Mcelroy MD   posaconazole (NOXAFIL) 100 MG EC tablet Take 3 tablets (300 mg) by mouth daily Take three tablets (300mg) by mouth twice a day first day of treatment. Then take 300mg once daily. 10/1/20  Yes Srinivas Mcelroy MD   predniSONE 2.5 MG PO tablet Take two tablets (5mg) every AM and one tablet (2.5mg) every evening 2/19/20  Yes Srinivas Mcelroy MD   tacrolimus (GENERIC EQUIVALENT) 0.5 MG capsule Take 1 capsule (0.5 mg) by mouth every evening Total dose: 1 mg in the AM and 0.5 mg in the PM 10/8/20  Yes Ame Chow PA-C   tacrolimus (GENERIC EQUIVALENT) 1 MG capsule Take 1 capsule (1 mg) by mouth every morning Total dose: 1 mg in the AM and 0.5 mg in the PM 10/8/20  Yes Ame Chow PA-C       Allergies  No Known Allergies    Review of systems  A 10-point review of systems was negative    Examination  LMP 06/07/2014 (Exact Date)   There is no height or weight on file to calculate BMI.    Gen- well, NAD, A+Ox3, normal color  Psych- normal mood    Laboratory  Lab Results   Component Value Date     12/09/2020    POTASSIUM 4.5 12/09/2020    CHLORIDE 101 12/09/2020    CO2 30 12/09/2020    BUN 36 12/09/2020    CR 3.04 12/09/2020       Lab Results   Component Value Date    BILITOTAL 0.8 12/09/2020    ALT 55 12/09/2020    AST 28 12/09/2020    ALKPHOS 333 12/09/2020       Lab Results   Component Value Date    ALBUMIN 2.9 12/09/2020    PROTTOTAL 6.6 12/09/2020        Lab Results   Component Value Date    WBC 5.7 12/09/2020    HGB 11.5 12/09/2020    MCV 97 12/09/2020     12/09/2020       Lab Results   Component Value Date    INR 0.94 11/20/2020       Radiology    ASSESSMENT AND PLAN:  New onset ascites.  Ms. Blue has new onset ascites, which  on fluid analysis is related with portal hypertension.  In fact, the liver biopsy showed  congestive hepatopathy with moderate zone 3 hepatocellular fibrosis without any bridging fibrosis.  This might have been aggravated by the status of her lungs and also her kidneys.  She now has had resolution of the ascites, and also her kidney improved.  The congestive hepatopathy we thought might have been related with the pulmonary hypertension, and as her lungs improved this might have also helped it.  So plan will be to continue doing the dialysis.  She will also be seen here if there is more ascites in the future or we will see her in 6-12 months.  We did explain that to the patient and she is comfortable with it, and she will follow with her primary care physician as previously scheduled.       Feng Denise MD  Hepatology  [facility]  V    ideo-Visit Details    Type of service:  Video Visit    Video Start Time: 11:03 AM.  Video End Time:  11:18 AM.    Originating Location (pt. Location): Home    Distant Location (provider location):  [facility] [place]     Platform used for Video Visit: Shaheen Denise MD

## 2020-12-21 NOTE — RESULT ENCOUNTER NOTE
Tacrolimus level 9.8 at 12 hours, on 12/17/20.  Goal 8-10.   Current dose 1 mg in AM, 0.5 mg in PM    No dose change at goal    [employer] message sent

## 2020-12-22 ENCOUNTER — TELEPHONE (OUTPATIENT)
Dept: TRANSPLANT | Facility: CLINIC | Age: 60
End: 2020-12-22

## 2020-12-22 DIAGNOSIS — Z94.2 LUNG REPLACED BY TRANSPLANT (H): Primary | ICD-10-CM

## 2020-12-22 RX ORDER — LIDOCAINE 40 MG/G
CREAM TOPICAL
Status: CANCELLED | OUTPATIENT
Start: 2020-12-22

## 2020-12-22 NOTE — TELEPHONE ENCOUNTER
Covid 19 tested resulted as negative (see care everywhere)  Notified patient of results. Plan for bronch with BAL on 12-23-20 ,she will be NPO after midnight and bring her medications with her to take after procedure.

## 2020-12-23 ENCOUNTER — RESULTS ONLY (OUTPATIENT)
Dept: OTHER | Facility: CLINIC | Age: 60
End: 2020-12-23

## 2020-12-23 ENCOUNTER — HOSPITAL ENCOUNTER (OUTPATIENT)
Facility: CLINIC | Age: 60
Discharge: HOME | End: 2020-12-23
Attending: INTERNAL MEDICINE | Admitting: INTERNAL MEDICINE
Payer: COMMERCIAL

## 2020-12-23 VITALS
RESPIRATION RATE: 20 BRPM | SYSTOLIC BLOOD PRESSURE: 94 MMHG | OXYGEN SATURATION: 92 % | HEIGHT: 63 IN | HEART RATE: 76 BPM | DIASTOLIC BLOOD PRESSURE: 65 MMHG | BODY MASS INDEX: 22.5 KG/M2 | WEIGHT: 127 LBS

## 2020-12-23 DIAGNOSIS — Z79.899 ENCOUNTER FOR LONG-TERM (CURRENT) USE OF HIGH-RISK MEDICATION: ICD-10-CM

## 2020-12-23 DIAGNOSIS — R79.89 ELEVATED LIVER FUNCTION TESTS: ICD-10-CM

## 2020-12-23 DIAGNOSIS — E83.42 HYPOMAGNESEMIA: ICD-10-CM

## 2020-12-23 DIAGNOSIS — Z94.2 LUNG REPLACED BY TRANSPLANT (H): ICD-10-CM

## 2020-12-23 DIAGNOSIS — Z94.2 S/P LUNG TRANSPLANT (H): ICD-10-CM

## 2020-12-23 LAB
ANION GAP SERPL CALCULATED.3IONS-SCNC: 8 MMOL/L (ref 3–14)
APPEARANCE FLD: CLEAR
BASOPHILS NFR FLD MANUAL: 2 %
BUN SERPL-MCNC: 48 MG/DL (ref 7–30)
CALCIUM SERPL-MCNC: 8.1 MG/DL (ref 8.5–10.1)
CHLORIDE SERPL-SCNC: 103 MMOL/L (ref 94–109)
CO2 SERPL-SCNC: 26 MMOL/L (ref 20–32)
COLOR FLD: COLORLESS
COPATH REPORT: NORMAL
CREAT SERPL-MCNC: 3.56 MG/DL (ref 0.52–1.04)
EOSINOPHIL NFR FLD MANUAL: 1 %
ERYTHROCYTE [DISTWIDTH] IN BLOOD BY AUTOMATED COUNT: 15.8 % (ref 10–15)
GFR SERPL CREATININE-BSD FRML MDRD: 13 ML/MIN/{1.73_M2}
GLUCOSE SERPL-MCNC: 130 MG/DL (ref 70–99)
GRAM STN SPEC: NORMAL
GRAM STN SPEC: NORMAL
HCT VFR BLD AUTO: 39.2 % (ref 35–47)
HGB BLD-MCNC: 12.4 G/DL (ref 11.7–15.7)
INR PPP: 1.02 (ref 0.86–1.14)
LYMPHOCYTES NFR FLD MANUAL: 9 %
MAGNESIUM SERPL-MCNC: 1.6 MG/DL (ref 1.6–2.3)
MCH RBC QN AUTO: 30.5 PG (ref 26.5–33)
MCHC RBC AUTO-ENTMCNC: 31.6 G/DL (ref 31.5–36.5)
MCV RBC AUTO: 97 FL (ref 78–100)
MONOS+MACROS NFR FLD MANUAL: 47 %
NEUTS BAND NFR FLD MANUAL: 41 %
PLATELET # BLD AUTO: 149 10E9/L (ref 150–450)
POTASSIUM SERPL-SCNC: 4.2 MMOL/L (ref 3.4–5.3)
RBC # BLD AUTO: 4.06 10E12/L (ref 3.8–5.2)
SODIUM SERPL-SCNC: 138 MMOL/L (ref 133–144)
SPECIMEN SOURCE FLD: NORMAL
SPECIMEN SOURCE: NORMAL
TACROLIMUS BLD-MCNC: 11.9 UG/L (ref 5–15)
TME LAST DOSE: NORMAL H
WBC # BLD AUTO: 5.5 10E9/L (ref 4–11)
WBC # FLD AUTO: 45 /UL

## 2020-12-23 PROCEDURE — 31624 DX BRONCHOSCOPE/LAVAGE: CPT | Performed by: INTERNAL MEDICINE

## 2020-12-23 PROCEDURE — 99153 MOD SED SAME PHYS/QHP EA: CPT | Performed by: INTERNAL MEDICINE

## 2020-12-23 PROCEDURE — 88108 CYTOPATH CONCENTRATE TECH: CPT | Mod: 26 | Performed by: PATHOLOGY

## 2020-12-23 PROCEDURE — 87070 CULTURE OTHR SPECIMN AEROBIC: CPT | Performed by: INTERNAL MEDICINE

## 2020-12-23 PROCEDURE — 250N000009 HC RX 250: Performed by: INTERNAL MEDICINE

## 2020-12-23 PROCEDURE — 88108 CYTOPATH CONCENTRATE TECH: CPT | Mod: TC | Performed by: INTERNAL MEDICINE

## 2020-12-23 PROCEDURE — 89051 BODY FLUID CELL COUNT: CPT | Performed by: INTERNAL MEDICINE

## 2020-12-23 PROCEDURE — 80197 ASSAY OF TACROLIMUS: CPT | Performed by: PHYSICIAN ASSISTANT

## 2020-12-23 PROCEDURE — 88312 SPECIAL STAINS GROUP 1: CPT | Mod: TC | Performed by: INTERNAL MEDICINE

## 2020-12-23 PROCEDURE — 83735 ASSAY OF MAGNESIUM: CPT | Performed by: INTERNAL MEDICINE

## 2020-12-23 PROCEDURE — 250N000011 HC RX IP 250 OP 636: Performed by: INTERNAL MEDICINE

## 2020-12-23 PROCEDURE — 85027 COMPLETE CBC AUTOMATED: CPT | Performed by: INTERNAL MEDICINE

## 2020-12-23 PROCEDURE — 87186 SC STD MICRODIL/AGAR DIL: CPT | Performed by: INTERNAL MEDICINE

## 2020-12-23 PROCEDURE — 84080 ASSAY ALKALINE PHOSPHATASES: CPT | Performed by: INTERNAL MEDICINE

## 2020-12-23 PROCEDURE — 88312 SPECIAL STAINS GROUP 1: CPT | Mod: 26 | Performed by: PATHOLOGY

## 2020-12-23 PROCEDURE — 31645 BRNCHSC W/THER ASPIR 1ST: CPT | Mod: GC | Performed by: INTERNAL MEDICINE

## 2020-12-23 PROCEDURE — 36415 COLL VENOUS BLD VENIPUNCTURE: CPT | Performed by: INTERNAL MEDICINE

## 2020-12-23 PROCEDURE — 87205 SMEAR GRAM STAIN: CPT | Performed by: INTERNAL MEDICINE

## 2020-12-23 PROCEDURE — 99152 MOD SED SAME PHYS/QHP 5/>YRS: CPT | Mod: GC | Performed by: INTERNAL MEDICINE

## 2020-12-23 PROCEDURE — 85610 PROTHROMBIN TIME: CPT | Performed by: INTERNAL MEDICINE

## 2020-12-23 PROCEDURE — 86833 HLA CLASS II HIGH DEFIN QUAL: CPT | Performed by: INTERNAL MEDICINE

## 2020-12-23 PROCEDURE — 87102 FUNGUS ISOLATION CULTURE: CPT | Performed by: INTERNAL MEDICINE

## 2020-12-23 PROCEDURE — 87116 MYCOBACTERIA CULTURE: CPT | Performed by: INTERNAL MEDICINE

## 2020-12-23 PROCEDURE — 87181 SC STD AGAR DILUTION PER AGT: CPT | Performed by: INTERNAL MEDICINE

## 2020-12-23 PROCEDURE — 87015 SPECIMEN INFECT AGNT CONCNTJ: CPT | Performed by: INTERNAL MEDICINE

## 2020-12-23 PROCEDURE — 99152 MOD SED SAME PHYS/QHP 5/>YRS: CPT | Performed by: INTERNAL MEDICINE

## 2020-12-23 PROCEDURE — 80048 BASIC METABOLIC PNL TOTAL CA: CPT | Performed by: INTERNAL MEDICINE

## 2020-12-23 PROCEDURE — 87206 SMEAR FLUORESCENT/ACID STAI: CPT | Performed by: INTERNAL MEDICINE

## 2020-12-23 PROCEDURE — 86832 HLA CLASS I HIGH DEFIN QUAL: CPT | Performed by: INTERNAL MEDICINE

## 2020-12-23 PROCEDURE — 87077 CULTURE AEROBIC IDENTIFY: CPT | Performed by: INTERNAL MEDICINE

## 2020-12-23 PROCEDURE — 87633 RESP VIRUS 12-25 TARGETS: CPT | Performed by: INTERNAL MEDICINE

## 2020-12-23 RX ORDER — FUROSEMIDE 40 MG
40 TABLET ORAL DAILY
Status: ON HOLD | COMMUNITY
Start: 2020-10-22 | End: 2021-02-25

## 2020-12-23 RX ORDER — LIDOCAINE HYDROCHLORIDE 40 MG/ML
INJECTION, SOLUTION RETROBULBAR PRN
Status: DISCONTINUED | OUTPATIENT
Start: 2020-12-23 | End: 2020-12-23 | Stop reason: HOSPADM

## 2020-12-23 RX ORDER — LIDOCAINE 40 MG/G
CREAM TOPICAL
Status: DISCONTINUED | OUTPATIENT
Start: 2020-12-23 | End: 2020-12-23 | Stop reason: HOSPADM

## 2020-12-23 RX ORDER — LIDOCAINE HYDROCHLORIDE 20 MG/ML
SOLUTION OROPHARYNGEAL PRN
Status: DISCONTINUED | OUTPATIENT
Start: 2020-12-23 | End: 2020-12-23 | Stop reason: HOSPADM

## 2020-12-23 RX ORDER — ONDANSETRON 2 MG/ML
INJECTION INTRAMUSCULAR; INTRAVENOUS PRN
Status: DISCONTINUED | OUTPATIENT
Start: 2020-12-23 | End: 2020-12-23 | Stop reason: HOSPADM

## 2020-12-23 RX ORDER — MAGNESIUM CHLORIDE 71.5 G/G
3 TABLET ORAL DAILY
Status: ON HOLD | COMMUNITY
Start: 2020-11-27 | End: 2021-02-25

## 2020-12-23 RX ORDER — FENTANYL CITRATE 50 UG/ML
INJECTION, SOLUTION INTRAMUSCULAR; INTRAVENOUS PRN
Status: DISCONTINUED | OUTPATIENT
Start: 2020-12-23 | End: 2020-12-23 | Stop reason: HOSPADM

## 2020-12-23 RX ORDER — LIDOCAINE/PRILOCAINE 2.5 %-2.5%
2.5 CREAM (GRAM) TOPICAL PRN
Status: ON HOLD | COMMUNITY
Start: 2020-12-11 | End: 2021-02-25

## 2020-12-23 ASSESSMENT — MIFFLIN-ST. JEOR: SCORE: 1125.07

## 2020-12-23 NOTE — DISCHARGE INSTRUCTIONS
Discharge Instructions after Bronchoscopy    Activity  _X__ You had medicine to relax and for pain. You may feel dizzy or sleepy.  For 24 hours:    Do not drive or use heavy equipment.    Do not make important decisions.    Do not drink any alcohol.    Diet  __X_ When you can swallow easily, you may go back to your regular diet, medicines  and light exercise.    Follow-up      Call right away if you have:    Unusual chest pain    Temperature above 100.6  F (37.5  C)    Coughing that does not stop.    If you have severe pain, bleeding, or shortness of breath, go to an emergency room.    If you have questions, call:  Monday to Friday, 7 a.m. to 4:30 p.m.  Endoscopy: [phone] (We may have to call you back)    After hours:  Hospital: [phone] (Ask for the pulmonary fellow on call)

## 2020-12-23 NOTE — OR NURSING
Bronch with endobronchial washing.  Secretions obtained and sent to lab.  Pt coughed throughout, but given additional sedation with little relief.  Stated tolerated well even with cough.  Oxygen from 4L nc to 12 L oxyplus mask.

## 2020-12-24 ENCOUNTER — DOCUMENTATION ONLY (OUTPATIENT)
Dept: TRANSPLANT | Facility: CLINIC | Age: 60
End: 2020-12-24

## 2020-12-24 LAB
CMV DNA SPEC NAA+PROBE-ACNC: 675 [IU]/ML
CMV DNA SPEC NAA+PROBE-LOG#: 2.8 {LOG_IU}/ML
FLUAV H1 2009 PAND RNA SPEC QL NAA+PROBE: NEGATIVE
FLUAV H1 RNA SPEC QL NAA+PROBE: NEGATIVE
FLUAV H3 RNA SPEC QL NAA+PROBE: NEGATIVE
FLUAV RNA SPEC QL NAA+PROBE: NEGATIVE
FLUBV RNA SPEC QL NAA+PROBE: NEGATIVE
HADV DNA SPEC QL NAA+PROBE: NEGATIVE
HADV DNA SPEC QL NAA+PROBE: NEGATIVE
HMPV RNA SPEC QL NAA+PROBE: NEGATIVE
HPIV1 RNA SPEC QL NAA+PROBE: NEGATIVE
HPIV2 RNA SPEC QL NAA+PROBE: NEGATIVE
HPIV3 RNA SPEC QL NAA+PROBE: NEGATIVE
MICROBIOLOGIST REVIEW: NORMAL
RHINOVIRUS RNA SPEC QL NAA+PROBE: NEGATIVE
RSV RNA SPEC QL NAA+PROBE: NEGATIVE
RSV RNA SPEC QL NAA+PROBE: NEGATIVE
SPECIMEN SOURCE: ABNORMAL
SPECIMEN SOURCE: NORMAL

## 2020-12-24 NOTE — PROGRESS NOTES
CYTOLOGIC INTERPRETATION:     Bronchoalveolar lavage w/GMS, right:   - Negative for malignancy   - No fungal or Pneumocystis organisms are identified on GMS stained   preparations.   - Viral cytopathic effect is absent.   Specimen Adequacy: Satisfactory for evaluation.

## 2020-12-26 LAB
ACID FAST STN SPEC QL: NORMAL
ALP BONE CFR SERPL: 80 U/L (ref 0–55)
ALP LIVER SERPL-CCNC: 239 U/L (ref 0–94)
ALP OTHER CFR SERPL: 0 U/L
ALP SERPL-CCNC: 319 U/L (ref 40–120)
SPECIMEN SOURCE: NORMAL

## 2020-12-28 LAB
BACTERIA SPEC CULT: ABNORMAL
DONOR IDENTIFICATION: NORMAL
DSA COMMENTS: NORMAL
DSA PRESENT: NO
DSA TEST METHOD: NORMAL
ORGAN: NORMAL
SA1 CELL: NORMAL
SA1 COMMENTS: NORMAL
SA1 HI RISK ABY: NORMAL
SA1 MOD RISK ABY: NORMAL
SA1 TEST METHOD: NORMAL
SA2 CELL: NORMAL
SA2 COMMENTS: NORMAL
SA2 HI RISK ABY UA: NORMAL
SA2 MOD RISK ABY: NORMAL
SA2 TEST METHOD: NORMAL
SPECIMEN SOURCE: ABNORMAL
UNACCEPTABLE ANTIGEN: NORMAL
UNOS CPRA: 0

## 2020-12-28 NOTE — RESULT ENCOUNTER NOTE
Looks like this patient has grown S maltophilia in her sputum in the past. Eikenella corrodens is new. Did you want to treat either one of these? Thanks!    Marleny GIL

## 2020-12-30 LAB — BRONCHOSCOPY: NORMAL

## 2020-12-30 NOTE — RESULT ENCOUNTER NOTE
Armand Mccloud,    I think you were already aware of this but wanted to pass on the report for Kecia's most recent bronch. Did Yael ever get back to you about IP?    Thanks,  Marleny

## 2021-01-04 ENCOUNTER — TELEPHONE (OUTPATIENT)
Dept: TRANSPLANT | Facility: CLINIC | Age: 61
End: 2021-01-04

## 2021-01-04 DIAGNOSIS — R79.89 ELEVATED LIVER FUNCTION TESTS: Primary | ICD-10-CM

## 2021-01-04 DIAGNOSIS — Z94.2 LUNG REPLACED BY TRANSPLANT (H): ICD-10-CM

## 2021-01-04 PROCEDURE — 80197 ASSAY OF TACROLIMUS: CPT | Performed by: PHYSICIAN ASSISTANT

## 2021-01-05 ENCOUNTER — TELEPHONE (OUTPATIENT)
Dept: CARDIOLOGY | Facility: CLINIC | Age: 61
End: 2021-01-05

## 2021-01-05 ENCOUNTER — TELEPHONE (OUTPATIENT)
Dept: TRANSPLANT | Facility: CLINIC | Age: 61
End: 2021-01-05

## 2021-01-05 DIAGNOSIS — Z94.2 S/P LUNG TRANSPLANT (H): ICD-10-CM

## 2021-01-05 DIAGNOSIS — I27.20 PULMONARY HYPERTENSION (H): Primary | ICD-10-CM

## 2021-01-05 LAB
TACROLIMUS BLD-MCNC: 8.3 UG/L (ref 5–15)
TME LAST DOSE: NORMAL H

## 2021-01-05 RX ORDER — TACROLIMUS 1 MG/1
1 CAPSULE ORAL EVERY MORNING
Qty: 30 CAPSULE | Refills: 11 | Status: ON HOLD | OUTPATIENT
Start: 2021-01-05 | End: 2021-02-25

## 2021-01-05 RX ORDER — TACROLIMUS 0.5 MG/1
0.5 CAPSULE ORAL EVERY EVENING
Qty: 30 CAPSULE | Refills: 11 | Status: ON HOLD | OUTPATIENT
Start: 2021-01-05 | End: 2021-02-25

## 2021-01-05 NOTE — TELEPHONE ENCOUNTER
----- Message from Krystle Tate RN sent at 11/27/2020  5:20 PM CST -----  Regarding: Needs F/U appt  See if patient has appt yet.  Duane is fine.  ----- Message -----  From: Starr Wong RN  Sent: 11/27/2020  To: Krystle Tate RN    Pt needs FU  ---------------------------------  Patient recently (Dec '20) had a 6mwt done for Pulmonary, but Echo is over a year old.    Called patient and advised her she was due for a video visit with Dr. Schaffer, but first we would like an Echo Completed near her.  She advised me she would have it done at [facility] in [place] if I would fax the orders over.  Agreed with plan and advised her I would send her a [employer] msg once I had faxed orders so she could schedule.  We then could schedule her visit with Dr. Schaffer once she had made the Echo appt.  Patient verbalized understanding, agreed with plan and denied any further questions. Krystle Tate RN on 1/5/2021 at 2:19 PM  -----------------------------------  Called [facility] in [place], transferred to [facility] where I spoke with Kecia.  Her fax number is [phone] for Echo orders.      Faxed Echo order. Krystle Tate RN on 1/5/2021 at 2:28 PM  ----------------------------------  Sent patient [employer] message alerting her I had faxed order. Krystle Tate RN on 1/5/2021 at 2:28 PM

## 2021-01-05 NOTE — TELEPHONE ENCOUNTER
Patient needs to be started on IV Ceftazidime per Ame Chow and Dr. Layton due to growing Steno in most recent bronch.     Will inquire where and which IV access patient needs for this.

## 2021-01-05 NOTE — RESULT ENCOUNTER NOTE
Tacrolimus level 8.3 at 12.5 hours, on 1/4/20.  Goal 8-10.   Current dose 1 mg in AM, 0.5 mg in PM    No dose change at goal    [employer] message sent

## 2021-01-05 NOTE — RESULT ENCOUNTER NOTE
I apologize if you have already seen and responded to this. It was still in my box and wanted to make sure we dont need to treat the following:  Aerobic Bacterial Panel  -Stenotrophomonas maltophilia  -Eikenella corrodens    Thank you,  Marleny

## 2021-01-06 ENCOUNTER — TELEPHONE (OUTPATIENT)
Dept: TRANSPLANT | Facility: CLINIC | Age: 61
End: 2021-01-06

## 2021-01-06 ENCOUNTER — HOME INFUSION (PRE-WILLOW HOME INFUSION) (OUTPATIENT)
Dept: PHARMACY | Facility: CLINIC | Age: 61
End: 2021-01-06

## 2021-01-06 DIAGNOSIS — A49.8 INFECTION DUE TO STENOTROPHOMONAS MALTOPHILIA: Primary | ICD-10-CM

## 2021-01-06 DIAGNOSIS — Z94.2 LUNG REPLACED BY TRANSPLANT (H): ICD-10-CM

## 2021-01-06 RX ORDER — CEFTAZIDIME 1 G/1
1 INJECTION, POWDER, FOR SOLUTION INTRAMUSCULAR; INTRAVENOUS EVERY 24 HOURS
Qty: 70 ML | Refills: 0 | Status: ON HOLD | COMMUNITY
Start: 2021-01-06 | End: 2021-02-25

## 2021-01-07 ENCOUNTER — TELEPHONE (OUTPATIENT)
Dept: TRANSPLANT | Facility: CLINIC | Age: 61
End: 2021-01-07

## 2021-01-07 NOTE — TELEPHONE ENCOUNTER
January 7, 2021 12:33 PM -  AIVERSE1: called pt to [facility] rtc appt lee crespo 1/27

## 2021-01-07 NOTE — PROGRESS NOTES
This is a recent snapshot of the patient's [facility] medical record.  For current drug dose and complete information and questions, call [phone] or In Basket pool, fv home infusion ([id number])  CSN Number:  [id number]

## 2021-01-12 DIAGNOSIS — Z94.2 LUNG REPLACED BY TRANSPLANT (H): ICD-10-CM

## 2021-01-12 DIAGNOSIS — B44.9 ASPERGILLUS (H): ICD-10-CM

## 2021-01-12 RX ORDER — POSACONAZOLE 100 MG/1
TABLET, DELAYED RELEASE ORAL
Qty: 90 TABLET | Refills: 2 | Status: ON HOLD | OUTPATIENT
Start: 2021-01-12 | End: 2021-02-25

## 2021-01-18 ENCOUNTER — TELEPHONE (OUTPATIENT)
Dept: CARDIOLOGY | Facility: CLINIC | Age: 61
End: 2021-01-18

## 2021-01-18 NOTE — TELEPHONE ENCOUNTER
Pt needs a virtual visit with Dr. Schaffer as soon as the pt is available.     Please link follow up orders when scheduling.

## 2021-01-19 NOTE — TELEPHONE ENCOUNTER
Called patient and let her know Dr. Denise ordered another lab (isoenzyme alkaline phosphatase).  Patient has lab appt 1/25 so will get done at that time.    Connie SEN LPN  Hepatology Clinic

## 2021-01-20 DIAGNOSIS — Z94.2 LUNG REPLACED BY TRANSPLANT (H): Primary | ICD-10-CM

## 2021-01-22 ENCOUNTER — TELEPHONE (OUTPATIENT)
Dept: TRANSPLANT | Facility: CLINIC | Age: 61
End: 2021-01-22

## 2021-01-22 ENCOUNTER — TRANSFERRED RECORDS (OUTPATIENT)
Dept: HEALTH INFORMATION MANAGEMENT | Facility: CLINIC | Age: 61
End: 2021-01-22

## 2021-01-22 NOTE — TELEPHONE ENCOUNTER
Received call from Zoraida (dialysis nurse) at [facility] dialysis center in [place], [place].  Patient did not show for dialysis yesterday as she felt ill, she arrived today and patient found to have SATs at 87 on room air, placed on 2 liters O2 per NC and Sats running at 90 now, running low grade temp of 99.1, they report non productive cough and increased fatigue in last few days.  Denies exposure to anyone with Covid.  They are transferring the patient to ER for further workup and evaluation.    I have alerted in house pulmonary team of current situation.

## 2021-01-22 NOTE — TELEPHONE ENCOUNTER
Spoke with spouse and he is going to hospital and asked if they need to transfer should the transfer to the [facility] and I confirmed that would be the location if she needed to be transferred,

## 2021-01-22 NOTE — TELEPHONE ENCOUNTER
Provider Call: General  Route to LPN    Reason for call: Call from pt's dialysis Center  Was due for her run yesterday and pt cancelled  Not feeling well cough fatigued they saw her today  Still tired coughing no chills temp 99.1  They will do a COVID test before she leaves  Images here next week     They checked her O2 sats on RA- 87%  They put her on 2 L O2   Call back needed? Yes

## 2021-01-24 ENCOUNTER — APPOINTMENT (OUTPATIENT)
Dept: GENERAL RADIOLOGY | Facility: CLINIC | Age: 61
End: 2021-01-24
Attending: INTERNAL MEDICINE
Payer: COMMERCIAL

## 2021-01-24 ENCOUNTER — HOSPITAL ENCOUNTER (INPATIENT)
Facility: CLINIC | Age: 61
LOS: 32 days | Discharge: NURSING, REHAB OR LONG-TERM CARE | End: 2021-02-25
Attending: INTERNAL MEDICINE | Admitting: INTERNAL MEDICINE
Payer: COMMERCIAL

## 2021-01-24 DIAGNOSIS — B44.9 ASPERGILLUS (H): ICD-10-CM

## 2021-01-24 DIAGNOSIS — Z94.2 LUNG REPLACED BY TRANSPLANT (H): ICD-10-CM

## 2021-01-24 DIAGNOSIS — J98.09 BRONCHIAL STENOSIS, RIGHT: ICD-10-CM

## 2021-01-24 DIAGNOSIS — J98.09 BRONCHIAL STENOSIS, RIGHT: Primary | ICD-10-CM

## 2021-01-24 DIAGNOSIS — Z94.2 S/P LUNG TRANSPLANT (H): ICD-10-CM

## 2021-01-24 PROBLEM — J96.90 RESPIRATORY FAILURE (H): Status: ACTIVE | Noted: 2021-01-24

## 2021-01-24 LAB
ALBUMIN SERPL-MCNC: 2 G/DL (ref 3.4–5)
ALBUMIN UR-MCNC: 30 MG/DL
ALP SERPL-CCNC: 244 U/L (ref 40–150)
ALT SERPL W P-5'-P-CCNC: 35 U/L (ref 0–50)
ALT SERPL-CCNC: 31 U/L (ref 8–45)
AMMONIA PLAS-SCNC: <10 UMOL/L (ref 10–50)
ANION GAP SERPL CALCULATED.3IONS-SCNC: 20 MMOL/L (ref 3–14)
APPEARANCE FLD: NORMAL
APPEARANCE UR: ABNORMAL
AST SERPL W P-5'-P-CCNC: 31 U/L (ref 0–45)
AST SERPL-CCNC: 25 U/L (ref 5–41)
BASE DEFICIT BLDA-SCNC: 8 MMOL/L
BILIRUB SERPL-MCNC: 1.2 MG/DL (ref 0.2–1.3)
BILIRUB UR QL STRIP: NEGATIVE
BUN SERPL-MCNC: 48 MG/DL (ref 7–30)
C PNEUM DNA SPEC QL NAA+PROBE: NOT DETECTED
CA-I BLD-MCNC: 4.5 MG/DL (ref 4.4–5.2)
CALCIUM SERPL-MCNC: 8.2 MG/DL (ref 8.5–10.1)
CHLORIDE SERPL-SCNC: 98 MMOL/L (ref 94–109)
CO2 SERPL-SCNC: 16 MMOL/L (ref 20–32)
COLOR FLD: NORMAL
COLOR UR AUTO: YELLOW
CREAT SERPL-MCNC: 3.25 MG/DL (ref 0.57–1.11)
CREAT SERPL-MCNC: 3.39 MG/DL (ref 0.52–1.04)
EOSINOPHIL NFR FLD MANUAL: 3 %
ERYTHROCYTE [DISTWIDTH] IN BLOOD BY AUTOMATED COUNT: 14.7 % (ref 10–15)
FLUAV H1 2009 PAND RNA SPEC QL NAA+PROBE: NOT DETECTED
FLUAV H1 RNA SPEC QL NAA+PROBE: NOT DETECTED
FLUAV H3 RNA SPEC QL NAA+PROBE: NOT DETECTED
FLUAV RNA SPEC QL NAA+PROBE: NOT DETECTED
FLUBV RNA SPEC QL NAA+PROBE: NOT DETECTED
GFR SERPL CREATININE-BSD FRML MDRD: 14 ML/MIN/{1.73_M2}
GFR SERPL CREATININE-BSD FRML MDRD: 15 ML/MIN/1.73M2
GLUCOSE BLDC GLUCOMTR-MCNC: 128 MG/DL (ref 70–99)
GLUCOSE BLDC GLUCOMTR-MCNC: 150 MG/DL (ref 70–99)
GLUCOSE BLDC GLUCOMTR-MCNC: 159 MG/DL (ref 70–99)
GLUCOSE SERPL-MCNC: 141 MG/DL (ref 70–100)
GLUCOSE SERPL-MCNC: 155 MG/DL (ref 70–99)
GLUCOSE UR STRIP-MCNC: NEGATIVE MG/DL
GRAM STN SPEC: NORMAL
GRAM STN SPEC: NORMAL
HADV DNA SPEC QL NAA+PROBE: NOT DETECTED
HBA1C MFR BLD: 6 % (ref 0–5.6)
HCO3 BLD-SCNC: 16 MMOL/L (ref 21–28)
HCOV PNL SPEC NAA+PROBE: NOT DETECTED
HCT VFR BLD AUTO: 34.6 % (ref 35–47)
HGB BLD-MCNC: 11.1 G/DL (ref 11.7–15.7)
HGB UR QL STRIP: ABNORMAL
HMPV RNA SPEC QL NAA+PROBE: NOT DETECTED
HPIV1 RNA SPEC QL NAA+PROBE: NOT DETECTED
HPIV2 RNA SPEC QL NAA+PROBE: NOT DETECTED
HPIV3 RNA SPEC QL NAA+PROBE: NOT DETECTED
HPIV4 RNA SPEC QL NAA+PROBE: NOT DETECTED
KETONES UR STRIP-MCNC: 5 MG/DL
KOH PREP SPEC: NORMAL
L PNEUMO1 AG UR QL IA: NORMAL
LACTATE BLD-SCNC: 0.7 MMOL/L (ref 0.7–2)
LEUKOCYTE ESTERASE UR QL STRIP: ABNORMAL
LYMPHOCYTES NFR FLD MANUAL: 5 %
M PNEUMO DNA SPEC QL NAA+PROBE: NOT DETECTED
MAGNESIUM SERPL-MCNC: 2.4 MG/DL (ref 1.6–2.3)
MAGNESIUM SERPL-MCNC: 2.5 MG/DL (ref 1.6–2.3)
MCH RBC QN AUTO: 30.6 PG (ref 26.5–33)
MCHC RBC AUTO-ENTMCNC: 32.1 G/DL (ref 31.5–36.5)
MCV RBC AUTO: 95 FL (ref 78–100)
MICROBIOLOGIST REVIEW: NORMAL
NEUTS BAND NFR FLD MANUAL: 81 %
NITRATE UR QL: NEGATIVE
O2/TOTAL GAS SETTING VFR VENT: 100 %
OTHER CELLS FLD MANUAL: 11 %
OXYHGB MFR BLD: 98 % (ref 92–100)
PCO2 BLD: 29 MM HG (ref 35–45)
PH BLD: 7.36 PH (ref 7.35–7.45)
PH UR STRIP: 5 PH (ref 5–7)
PHOSPHATE SERPL-MCNC: 6.5 MG/DL (ref 2.5–4.5)
PHOSPHATE SERPL-MCNC: 6.5 MG/DL (ref 2.5–4.5)
PLATELET # BLD AUTO: 192 10E9/L (ref 150–450)
PO2 BLD: 177 MM HG (ref 80–105)
POTASSIUM SERPL-SCNC: 3.7 MMOL/L (ref 3.4–5.3)
POTASSIUM SERPL-SCNC: 3.7 MMOL/L (ref 3.5–5.1)
POTASSIUM SERPL-SCNC: 3.8 MMOL/L (ref 3.4–5.3)
PROT SERPL-MCNC: 6.1 G/DL (ref 6.8–8.8)
RBC # BLD AUTO: 3.63 10E12/L (ref 3.8–5.2)
RBC #/AREA URNS AUTO: 26 /HPF (ref 0–2)
RSV RNA SPEC QL NAA+PROBE: NOT DETECTED
RSV RNA SPEC QL NAA+PROBE: NOT DETECTED
RV+EV RNA SPEC QL NAA+PROBE: NOT DETECTED
S PNEUM AG SPEC QL: NORMAL
SODIUM SERPL-SCNC: 134 MMOL/L (ref 133–144)
SOURCE: ABNORMAL
SP GR UR STRIP: 1.02 (ref 1–1.03)
SPECIMEN SOURCE FLD: NORMAL
SPECIMEN SOURCE: NORMAL
SQUAMOUS #/AREA URNS AUTO: 1 /HPF (ref 0–1)
TRANS CELLS #/AREA URNS HPF: 1 /HPF (ref 0–1)
TRIGL SERPL-MCNC: 242 MG/DL (ref 30–150)
TROPONIN I SERPL-MCNC: <0.015 UG/L (ref 0–0.04)
UROBILINOGEN UR STRIP-MCNC: NORMAL MG/DL (ref 0–2)
WBC # BLD AUTO: 20.5 10E9/L (ref 4–11)
WBC # FLD AUTO: 355 /UL
WBC #/AREA URNS AUTO: 34 /HPF (ref 0–5)
WBC CLUMPS #/AREA URNS HPF: PRESENT /HPF

## 2021-01-24 PROCEDURE — 82140 ASSAY OF AMMONIA: CPT | Performed by: INTERNAL MEDICINE

## 2021-01-24 PROCEDURE — 5A1955Z RESPIRATORY VENTILATION, GREATER THAN 96 CONSECUTIVE HOURS: ICD-10-PCS | Performed by: INTERNAL MEDICINE

## 2021-01-24 PROCEDURE — 94640 AIRWAY INHALATION TREATMENT: CPT | Mod: 76

## 2021-01-24 PROCEDURE — 250N000011 HC RX IP 250 OP 636: Performed by: STUDENT IN AN ORGANIZED HEALTH CARE EDUCATION/TRAINING PROGRAM

## 2021-01-24 PROCEDURE — 82805 BLOOD GASES W/O2 SATURATION: CPT | Performed by: INTERNAL MEDICINE

## 2021-01-24 PROCEDURE — 258N000003 HC RX IP 258 OP 636: Performed by: STUDENT IN AN ORGANIZED HEALTH CARE EDUCATION/TRAINING PROGRAM

## 2021-01-24 PROCEDURE — 87899 AGENT NOS ASSAY W/OPTIC: CPT | Performed by: STUDENT IN AN ORGANIZED HEALTH CARE EDUCATION/TRAINING PROGRAM

## 2021-01-24 PROCEDURE — 71045 X-RAY EXAM CHEST 1 VIEW: CPT

## 2021-01-24 PROCEDURE — 999N000157 HC STATISTIC RCP TIME EA 10 MIN

## 2021-01-24 PROCEDURE — 83605 ASSAY OF LACTIC ACID: CPT | Performed by: INTERNAL MEDICINE

## 2021-01-24 PROCEDURE — 85027 COMPLETE CBC AUTOMATED: CPT | Performed by: INTERNAL MEDICINE

## 2021-01-24 PROCEDURE — 87205 SMEAR GRAM STAIN: CPT | Performed by: INTERNAL MEDICINE

## 2021-01-24 PROCEDURE — 87015 SPECIMEN INFECT AGNT CONCNTJ: CPT | Performed by: INTERNAL MEDICINE

## 2021-01-24 PROCEDURE — 89051 BODY FLUID CELL COUNT: CPT | Performed by: INTERNAL MEDICINE

## 2021-01-24 PROCEDURE — 80053 COMPREHEN METABOLIC PANEL: CPT | Performed by: INTERNAL MEDICINE

## 2021-01-24 PROCEDURE — 31624 DX BRONCHOSCOPE/LAVAGE: CPT | Mod: GC | Performed by: INTERNAL MEDICINE

## 2021-01-24 PROCEDURE — 36600 WITHDRAWAL OF ARTERIAL BLOOD: CPT

## 2021-01-24 PROCEDURE — 200N000002 HC R&B ICU UMMC

## 2021-01-24 PROCEDURE — 87798 DETECT AGENT NOS DNA AMP: CPT | Performed by: INTERNAL MEDICINE

## 2021-01-24 PROCEDURE — 0B9C8ZX DRAINAGE OF RIGHT UPPER LUNG LOBE, VIA NATURAL OR ARTIFICIAL OPENING ENDOSCOPIC, DIAGNOSTIC: ICD-10-PCS | Performed by: INTERNAL MEDICINE

## 2021-01-24 PROCEDURE — 99291 CRITICAL CARE FIRST HOUR: CPT | Mod: 25 | Performed by: INTERNAL MEDICINE

## 2021-01-24 PROCEDURE — 36415 COLL VENOUS BLD VENIPUNCTURE: CPT | Performed by: INTERNAL MEDICINE

## 2021-01-24 PROCEDURE — 31624 DX BRONCHOSCOPE/LAVAGE: CPT

## 2021-01-24 PROCEDURE — 88312 SPECIAL STAINS GROUP 1: CPT | Mod: TC | Performed by: INTERNAL MEDICINE

## 2021-01-24 PROCEDURE — 83735 ASSAY OF MAGNESIUM: CPT | Performed by: NURSE PRACTITIONER

## 2021-01-24 PROCEDURE — 84132 ASSAY OF SERUM POTASSIUM: CPT | Performed by: NURSE PRACTITIONER

## 2021-01-24 PROCEDURE — 87102 FUNGUS ISOLATION CULTURE: CPT | Performed by: INTERNAL MEDICINE

## 2021-01-24 PROCEDURE — 87633 RESP VIRUS 12-25 TARGETS: CPT | Performed by: STUDENT IN AN ORGANIZED HEALTH CARE EDUCATION/TRAINING PROGRAM

## 2021-01-24 PROCEDURE — 250N000012 HC RX MED GY IP 250 OP 636 PS 637: Performed by: NURSE PRACTITIONER

## 2021-01-24 PROCEDURE — 82330 ASSAY OF CALCIUM: CPT | Performed by: INTERNAL MEDICINE

## 2021-01-24 PROCEDURE — 250N000011 HC RX IP 250 OP 636: Performed by: NURSE PRACTITIONER

## 2021-01-24 PROCEDURE — 71045 X-RAY EXAM CHEST 1 VIEW: CPT | Mod: 26 | Performed by: RADIOLOGY

## 2021-01-24 PROCEDURE — 250N000009 HC RX 250: Performed by: NURSE PRACTITIONER

## 2021-01-24 PROCEDURE — 87581 M.PNEUMON DNA AMP PROBE: CPT | Performed by: STUDENT IN AN ORGANIZED HEALTH CARE EDUCATION/TRAINING PROGRAM

## 2021-01-24 PROCEDURE — 87070 CULTURE OTHR SPECIMN AEROBIC: CPT | Performed by: INTERNAL MEDICINE

## 2021-01-24 PROCEDURE — 87086 URINE CULTURE/COLONY COUNT: CPT | Performed by: INTERNAL MEDICINE

## 2021-01-24 PROCEDURE — 84100 ASSAY OF PHOSPHORUS: CPT | Performed by: NURSE PRACTITIONER

## 2021-01-24 PROCEDURE — 272N000078 HC NUTRITION PRODUCT INTERMEDIATE LITER

## 2021-01-24 PROCEDURE — 87486 CHLMYD PNEUM DNA AMP PROBE: CPT | Performed by: STUDENT IN AN ORGANIZED HEALTH CARE EDUCATION/TRAINING PROGRAM

## 2021-01-24 PROCEDURE — 94640 AIRWAY INHALATION TREATMENT: CPT

## 2021-01-24 PROCEDURE — 87206 SMEAR FLUORESCENT/ACID STAI: CPT | Performed by: INTERNAL MEDICINE

## 2021-01-24 PROCEDURE — 87633 RESP VIRUS 12-25 TARGETS: CPT | Performed by: INTERNAL MEDICINE

## 2021-01-24 PROCEDURE — 36415 COLL VENOUS BLD VENIPUNCTURE: CPT | Performed by: STUDENT IN AN ORGANIZED HEALTH CARE EDUCATION/TRAINING PROGRAM

## 2021-01-24 PROCEDURE — 87040 BLOOD CULTURE FOR BACTERIA: CPT | Performed by: INTERNAL MEDICINE

## 2021-01-24 PROCEDURE — 87103 BLOOD FUNGUS CULTURE: CPT | Performed by: STUDENT IN AN ORGANIZED HEALTH CARE EDUCATION/TRAINING PROGRAM

## 2021-01-24 PROCEDURE — 83735 ASSAY OF MAGNESIUM: CPT | Performed by: INTERNAL MEDICINE

## 2021-01-24 PROCEDURE — 258N000003 HC RX IP 258 OP 636: Performed by: NURSE PRACTITIONER

## 2021-01-24 PROCEDURE — 87385 HISTOPLASMA CAPSUL AG IA: CPT | Performed by: STUDENT IN AN ORGANIZED HEALTH CARE EDUCATION/TRAINING PROGRAM

## 2021-01-24 PROCEDURE — 250N000013 HC RX MED GY IP 250 OP 250 PS 637: Performed by: STUDENT IN AN ORGANIZED HEALTH CARE EDUCATION/TRAINING PROGRAM

## 2021-01-24 PROCEDURE — 83036 HEMOGLOBIN GLYCOSYLATED A1C: CPT | Performed by: INTERNAL MEDICINE

## 2021-01-24 PROCEDURE — 94002 VENT MGMT INPAT INIT DAY: CPT

## 2021-01-24 PROCEDURE — 87081 CULTURE SCREEN ONLY: CPT | Performed by: INTERNAL MEDICINE

## 2021-01-24 PROCEDURE — 88108 CYTOPATH CONCENTRATE TECH: CPT | Mod: TC | Performed by: INTERNAL MEDICINE

## 2021-01-24 PROCEDURE — 36415 COLL VENOUS BLD VENIPUNCTURE: CPT | Performed by: NURSE PRACTITIONER

## 2021-01-24 PROCEDURE — 87210 SMEAR WET MOUNT SALINE/INK: CPT | Performed by: INTERNAL MEDICINE

## 2021-01-24 PROCEDURE — 3E043XZ INTRODUCTION OF VASOPRESSOR INTO CENTRAL VEIN, PERCUTANEOUS APPROACH: ICD-10-PCS | Performed by: INTERNAL MEDICINE

## 2021-01-24 PROCEDURE — 99223 1ST HOSP IP/OBS HIGH 75: CPT | Mod: GC | Performed by: INTERNAL MEDICINE

## 2021-01-24 PROCEDURE — 84484 ASSAY OF TROPONIN QUANT: CPT | Performed by: INTERNAL MEDICINE

## 2021-01-24 PROCEDURE — 88312 SPECIAL STAINS GROUP 1: CPT | Mod: 26 | Performed by: PATHOLOGY

## 2021-01-24 PROCEDURE — 999N001017 HC STATISTIC GLUCOSE BY METER IP

## 2021-01-24 PROCEDURE — 81001 URINALYSIS AUTO W/SCOPE: CPT | Performed by: STUDENT IN AN ORGANIZED HEALTH CARE EDUCATION/TRAINING PROGRAM

## 2021-01-24 PROCEDURE — 87116 MYCOBACTERIA CULTURE: CPT | Performed by: INTERNAL MEDICINE

## 2021-01-24 PROCEDURE — 84484 ASSAY OF TROPONIN QUANT: CPT | Performed by: NURSE PRACTITIONER

## 2021-01-24 PROCEDURE — 84100 ASSAY OF PHOSPHORUS: CPT | Performed by: INTERNAL MEDICINE

## 2021-01-24 PROCEDURE — 88108 CYTOPATH CONCENTRATE TECH: CPT | Mod: 26 | Performed by: PATHOLOGY

## 2021-01-24 PROCEDURE — 250N000013 HC RX MED GY IP 250 OP 250 PS 637: Performed by: NURSE PRACTITIONER

## 2021-01-24 RX ORDER — SULFAMETHOXAZOLE AND TRIMETHOPRIM 200; 40 MG/5ML; MG/5ML
295 SUSPENSION ORAL 2 TIMES DAILY
Status: DISCONTINUED | OUTPATIENT
Start: 2021-01-24 | End: 2021-01-29 | Stop reason: CLARIF

## 2021-01-24 RX ORDER — NALOXONE HYDROCHLORIDE 0.4 MG/ML
0.4 INJECTION, SOLUTION INTRAMUSCULAR; INTRAVENOUS; SUBCUTANEOUS
Status: DISCONTINUED | OUTPATIENT
Start: 2021-01-24 | End: 2021-02-20

## 2021-01-24 RX ORDER — NICOTINE POLACRILEX 4 MG
15-30 LOZENGE BUCCAL
Status: DISCONTINUED | OUTPATIENT
Start: 2021-01-24 | End: 2021-01-24

## 2021-01-24 RX ORDER — PREDNISONE 5 MG/1
5 TABLET ORAL EVERY MORNING
Status: DISCONTINUED | OUTPATIENT
Start: 2021-01-25 | End: 2021-02-25 | Stop reason: HOSPADM

## 2021-01-24 RX ORDER — PROPOFOL 10 MG/ML
10-20 INJECTION, EMULSION INTRAVENOUS EVERY 30 MIN PRN
Status: DISCONTINUED | OUTPATIENT
Start: 2021-01-24 | End: 2021-01-26

## 2021-01-24 RX ORDER — NOREPINEPHRINE BITARTRATE 0.06 MG/ML
.03-.4 INJECTION, SOLUTION INTRAVENOUS CONTINUOUS
Status: DISCONTINUED | OUTPATIENT
Start: 2021-01-24 | End: 2021-01-30

## 2021-01-24 RX ORDER — PIPERACILLIN SODIUM, TAZOBACTAM SODIUM 2; .25 G/10ML; G/10ML
2.25 INJECTION, POWDER, LYOPHILIZED, FOR SOLUTION INTRAVENOUS EVERY 6 HOURS
Status: DISCONTINUED | OUTPATIENT
Start: 2021-01-24 | End: 2021-01-29

## 2021-01-24 RX ORDER — PREDNISONE 2.5 MG/1
2.5 TABLET ORAL EVERY EVENING
Status: DISCONTINUED | OUTPATIENT
Start: 2021-01-25 | End: 2021-02-17

## 2021-01-24 RX ORDER — METHYLPREDNISOLONE SODIUM SUCCINATE 125 MG/2ML
125 INJECTION, POWDER, LYOPHILIZED, FOR SOLUTION INTRAMUSCULAR; INTRAVENOUS EVERY 6 HOURS
Status: DISCONTINUED | OUTPATIENT
Start: 2021-01-24 | End: 2021-01-25

## 2021-01-24 RX ORDER — NICOTINE POLACRILEX 4 MG
15-30 LOZENGE BUCCAL
Status: DISCONTINUED | OUTPATIENT
Start: 2021-01-24 | End: 2021-02-12 | Stop reason: CLARIF

## 2021-01-24 RX ORDER — DEXTROSE MONOHYDRATE 25 G/50ML
25-50 INJECTION, SOLUTION INTRAVENOUS
Status: DISCONTINUED | OUTPATIENT
Start: 2021-01-24 | End: 2021-02-12 | Stop reason: CLARIF

## 2021-01-24 RX ORDER — NALOXONE HYDROCHLORIDE 0.4 MG/ML
0.2 INJECTION, SOLUTION INTRAMUSCULAR; INTRAVENOUS; SUBCUTANEOUS
Status: DISCONTINUED | OUTPATIENT
Start: 2021-01-24 | End: 2021-02-20

## 2021-01-24 RX ORDER — DEXTROSE MONOHYDRATE 100 MG/ML
INJECTION, SOLUTION INTRAVENOUS CONTINUOUS PRN
Status: DISCONTINUED | OUTPATIENT
Start: 2021-01-24 | End: 2021-02-25 | Stop reason: HOSPADM

## 2021-01-24 RX ORDER — BISACODYL 10 MG
10 SUPPOSITORY, RECTAL RECTAL DAILY PRN
Status: DISCONTINUED | OUTPATIENT
Start: 2021-01-24 | End: 2021-02-25 | Stop reason: HOSPADM

## 2021-01-24 RX ORDER — FAMOTIDINE 40 MG/5ML
20 POWDER, FOR SUSPENSION ORAL DAILY
Status: DISCONTINUED | OUTPATIENT
Start: 2021-01-24 | End: 2021-01-26

## 2021-01-24 RX ORDER — PROPOFOL 10 MG/ML
5-75 INJECTION, EMULSION INTRAVENOUS CONTINUOUS
Status: DISCONTINUED | OUTPATIENT
Start: 2021-01-24 | End: 2021-01-26

## 2021-01-24 RX ORDER — IPRATROPIUM BROMIDE AND ALBUTEROL SULFATE 2.5; .5 MG/3ML; MG/3ML
3 SOLUTION RESPIRATORY (INHALATION)
Status: DISCONTINUED | OUTPATIENT
Start: 2021-01-24 | End: 2021-02-02

## 2021-01-24 RX ORDER — HEPARIN SODIUM 5000 [USP'U]/.5ML
5000 INJECTION, SOLUTION INTRAVENOUS; SUBCUTANEOUS EVERY 8 HOURS
Status: DISCONTINUED | OUTPATIENT
Start: 2021-01-24 | End: 2021-02-12

## 2021-01-24 RX ORDER — BUDESONIDE 0.5 MG/2ML
0.5 INHALANT ORAL 2 TIMES DAILY
Status: DISCONTINUED | OUTPATIENT
Start: 2021-01-24 | End: 2021-02-25 | Stop reason: HOSPADM

## 2021-01-24 RX ORDER — DEXTROSE MONOHYDRATE 25 G/50ML
25-50 INJECTION, SOLUTION INTRAVENOUS
Status: DISCONTINUED | OUTPATIENT
Start: 2021-01-24 | End: 2021-01-24

## 2021-01-24 RX ORDER — POLYETHYLENE GLYCOL 3350 17 G/17G
17 POWDER, FOR SOLUTION ORAL DAILY
Status: DISCONTINUED | OUTPATIENT
Start: 2021-01-24 | End: 2021-02-08

## 2021-01-24 RX ORDER — MIDAZOLAM (PF) 1 MG/ML IN 0.9 % SODIUM CHLORIDE INTRAVENOUS SOLUTION
1-8 CONTINUOUS
Status: DISCONTINUED | OUTPATIENT
Start: 2021-01-24 | End: 2021-01-26

## 2021-01-24 RX ADMIN — IPRATROPIUM BROMIDE AND ALBUTEROL SULFATE 3 ML: .5; 3 SOLUTION RESPIRATORY (INHALATION) at 15:06

## 2021-01-24 RX ADMIN — PIPERACILLIN AND TAZOBACTAM 2.25 G: 2; .25 INJECTION, POWDER, FOR SOLUTION INTRAVENOUS at 17:02

## 2021-01-24 RX ADMIN — Medication 50 MCG/HR: at 15:24

## 2021-01-24 RX ADMIN — POLYETHYLENE GLYCOL 3350 17 G: 17 POWDER, FOR SOLUTION ORAL at 18:08

## 2021-01-24 RX ADMIN — Medication 2.4 UNITS/HR: at 17:39

## 2021-01-24 RX ADMIN — Medication 0.26 MCG/KG/MIN: at 15:34

## 2021-01-24 RX ADMIN — SODIUM CHLORIDE, POTASSIUM CHLORIDE, SODIUM LACTATE AND CALCIUM CHLORIDE 500 ML: 600; 310; 30; 20 INJECTION, SOLUTION INTRAVENOUS at 16:55

## 2021-01-24 RX ADMIN — METHYLPREDNISOLONE SODIUM SUCCINATE 125 MG: 125 INJECTION, POWDER, FOR SOLUTION INTRAMUSCULAR; INTRAVENOUS at 22:52

## 2021-01-24 RX ADMIN — HEPARIN SODIUM 5000 UNITS: 5000 INJECTION, SOLUTION INTRAVENOUS; SUBCUTANEOUS at 17:15

## 2021-01-24 RX ADMIN — SODIUM CHLORIDE 300 MG: 9 INJECTION, SOLUTION INTRAVENOUS at 17:10

## 2021-01-24 RX ADMIN — METHYLPREDNISOLONE SODIUM SUCCINATE 125 MG: 125 INJECTION, POWDER, FOR SOLUTION INTRAMUSCULAR; INTRAVENOUS at 18:10

## 2021-01-24 RX ADMIN — PIPERACILLIN AND TAZOBACTAM 2.25 G: 2; .25 INJECTION, POWDER, FOR SOLUTION INTRAVENOUS at 22:52

## 2021-01-24 RX ADMIN — TACROLIMUS 0.5 MG: 5 CAPSULE ORAL at 18:09

## 2021-01-24 RX ADMIN — IPRATROPIUM BROMIDE AND ALBUTEROL SULFATE 3 ML: .5; 3 SOLUTION RESPIRATORY (INHALATION) at 20:48

## 2021-01-24 RX ADMIN — FAMOTIDINE 20 MG: 40 POWDER, FOR SUSPENSION ORAL at 18:10

## 2021-01-24 RX ADMIN — SULFAMETHOXAZOLE AND TRIMETHOPRIM 295 MG: 200; 40 SUSPENSION ORAL at 20:03

## 2021-01-24 RX ADMIN — HEPARIN SODIUM 5000 UNITS: 5000 INJECTION, SOLUTION INTRAVENOUS; SUBCUTANEOUS at 22:52

## 2021-01-24 RX ADMIN — Medication 2 MG/HR: at 17:33

## 2021-01-24 RX ADMIN — PROPOFOL 60 MCG/KG/MIN: 10 INJECTION, EMULSION INTRAVENOUS at 15:34

## 2021-01-24 ASSESSMENT — ACTIVITIES OF DAILY LIVING (ADL)
ADLS_ACUITY_SCORE: 11
ADLS_ACUITY_SCORE: 11

## 2021-01-24 ASSESSMENT — MIFFLIN-ST. JEOR: SCORE: 1135

## 2021-01-24 NOTE — PROGRESS NOTES
Nutrition Brief - Nutrition consult, reason not specified    Interventions:  Chart reviewed. No Notes in the chart, No H&P, No Labs    Recommendations:  Please reconsult if indicated     Forjazmin Pearson RD/STEPHANY  Weekend / Holiday relief RD  Pager [phone]

## 2021-01-24 NOTE — PHARMACY-VANCOMYCIN DOSING SERVICE
Pharmacy Vancomycin Initial Note  Date of Service 2021  Patient's  1960  60 year old, female    Indication: Sepsis    Current estimated CrCl = pt on iHD    Creatinine for last 3 days  No results found for requested labs within last 72 hours.    Recent Vancomycin Level(s) for last 3 days  No results found for requested labs within last 72 hours.      Vancomycin IV Administrations (past 72 hours)      No vancomycin orders with administrations in past 72 hours.                Nephrotoxins and other renal medications (From now, onward)    Start     Dose/Rate Route Frequency Ordered Stop    21 0800  tacrolimus (PROGRAF BRAND) suspension 1 mg      1 mg Oral or Feeding Tube EVERY MORNING. 21 1443      21 1800  tacrolimus (GENERIC EQUIVALENT) suspension 0.5 mg      0.5 mg Oral or Feeding Tube EVERY EVENING. 21 1443      21 1630  piperacillin-tazobactam (ZOSYN) 2.25 g vial to attach to  ml bag      2.25 g  over 30 Minutes Intravenous EVERY 6 HOURS 21 1443      21 1528  vancomycin place jordan - receiving intermittent dosing      1 each Does not apply SEE ADMIN INSTRUCTIONS 21 1528      21 1500  norepinephrine (LEVOPHED) 16 mg in  mL infusion CENTRAL LINE      0.03-0.4 mcg/kg/min × 58.6 kg (Dosing Weight)  1.6-22 mL/hr  Intravenous CONTINUOUS 21 1443            Contrast Orders - past 72 hours (72h ago, onward)    None                Plan:  1.  Patient received Vancomycin 1250 mg iv x1 today @ 1045.  Will continue with intermittent dosing.  2.  Goal Trough Level: 15-20 mg/L   3.  Pharmacy will check trough levels as appropriate in 1-3 Days.    4. Serum creatinine levels will be ordered daily for the first week of therapy and at least twice weekly for subsequent weeks.    5. [place] method utilized to dose vancomycin therapy: Method 2    Katelyn Maher, JoseD

## 2021-01-24 NOTE — H&P
MICU Admit Note 1-    MICU Staff      This patient has been seen and evaluated by me.  I have discussed care with the housestaff and agree with the findings and plan in their note. 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 She was transferred to OSH ICU 1/22 and intubated and requiring vasopressors. 1/24 transferred to [facility] Medical ICU service for ongoing management. Outside chest CT showed bilateral infiltrates. Pneumonia in an ICH. Will do BAL and initiate broad spectrum antibiotics. Hypotensive on propofol. Sedation: use versed and fentanyl.Stop Propofol. Start broad spectrum antibiotics with vanco and zosyn and Bactrim (has H/O stenotrophomonas). Start stress dose steroids. Consult renal for ongoing HD. Continue posaconazole. Patient is covid negative. Send blood and urine culture. Hypotension: likely due to septic shock and propofol. On NE and vasopressin, Get echocardiogram. Will send blood for CMV.    Uri Bass MD  Pulmonary/Critical Care  January 24, 2021 4:09 PM      CCT 60 minutes separate from procedures.

## 2021-01-24 NOTE — CONSULTS
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     S/p B/L lung transplant for ILD  CTD:  Transplant in 2018.     Immunosuppression:  - Tacrolimus 1mg in am and 0.5mg Pm.  Goal level 8-10.  Last tacro level 8.3 on 1/3/21.  Next tacro level pending (ordered).  - Prednisone 5mg am and 2.5mg pm  - Not on a third agent due to recurrent infections    Prophylaxis: is on indefinite ppx for aspergillus for prior L sided aspergillus empyema. Was not on PJP ppx as CD4>200.     Acute Hypoxic Respiratory failure:  Suspect given findings on bronchoscopy, elevated WBC and sepsis suspect that patient likely has acute bacterial PNA w/associated sepsis.  - Bronch w/BAL performed on  - Follow-up results   - Continue posaconazole ppx  - Continue tacro at home dose as above  - Tacro level in the am   - Ok to hold prednisone while on stress dose steroids   - Agree w/Vanc, Zosyn and Bactrim (PJP dosing)  - Fungal blood cultures  - Please ask lab if possible to perform PJP PCR from bronch STAT    We appreciate the excellent care provided by the MICU team. Recommendations communicated via in person rounding and this note. Will continue to follow along closely, please do not hesitate to call with any questions or concerns.    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 She was transferred to OSH ICU 1/22 and intubated and requiring vasopressors. 1/24 transferred to [facility] Medical ICU service for ongoing management.    History obtained from chart review.    [facility] w/worsening SOB but no other symptoms. Satting in the mid-low 80s on arrival. Initially on 4L NC and then rapidly required 15L non-rebreather. Patient O2 sats continued to drop so required intubation on 1/24 and was transferred to ICU and then eventually transferred to [facility] for further management.     Review of Systems:     C: No fever, no chills, no change in weight, no change in appetite  INTEGUMENTARY/SKIN: No rash or obvious new lesions  ENT/MOUTH: No sore throat, no sinus pain, no nasal congestion or drainage  RESP: See interval history  CV: No chest pain, no palpitations, no peripheral edema, no orthopnea  GI: No nausea, no vomiting, no change in stools, no reflux symptoms  : No dysuria  MUSCULOSKELETAL: No myalgias, no arthralgias  ENDOCRINE: Blood sugars with adequate control  NEURO: No headache, no numbness or tingling  PSYCHIATRIC: Mood stable    Medical and Surgical History:     Past Medical History:   Diagnosis Date    Abdominal pain 10/6/2019    Acute on chronic respiratory failure with hypoxia (H) 2/21/2018    Antisynthetase syndrome (H) 2014    Chronic cough     Chronic infection     recent C diff  8/18    Dehydration 8/1/2018    Dehydration 8/1/2018    Dermatomyositis (H)     Dysplasia of cervix, low grade (ESTRADA 1)     ILD  (interstitial lung disease) (H)     Nausea and vomiting 12/4/2018    Osteopenia     PONV (postoperative nausea and vomiting)     Pulmonary hypertension (H)     Raynaud's disease     Seronegative rheumatoid arthritis (H)      Past Surgical History:   Procedure Laterality Date    BRONCHOSCOPY (RIGID OR FLEXIBLE), DIAGNOSTIC N/A 4/10/2018    Procedure: COMBINED BRONCHOSCOPY (RIGID OR FLEXIBLE), LAVAGE;;  Surgeon: Mariposa Donohue MD;  [facility]: [facility]    BRONCHOSCOPY (RIGID OR FLEXIBLE), DIAGNOSTIC N/A 12/23/2020    Procedure: BRONCHOSCOPY, WITH BRONCHOALVEOLAR LAVAGE;  Surgeon: Uri Bass MD;  [facility]: [facility]    BRONCHOSCOPY (RIGID OR FLEXIBLE), DILATE BRONCHUS / TRACHEA N/A 10/11/2018    Procedure: BRONCHOSCOPY (RIGID OR FLEXIBLE), DILATE BRONCHUS / TRACHEA;  Flexible And Rigid Bronchoscopy and Dilation;  Surgeon: Wilber Lin MD;  [facility]    BRONCHOSCOPY FLEXIBLE N/A 3/13/2018    Procedure: BRONCHOSCOPY FLEXIBLE;  Flexible Bronchoscopy ;  Surgeon: Gissell Sanchez MD;  [facility]    BRONCHOSCOPY FLEXIBLE N/A 5/9/2018    Procedure: BRONCHOSCOPY FLEXIBLE;;  Surgeon: Wilber Lin MD;  [facility]    BRONCHOSCOPY FLEXIBLE AND RIGID N/A 9/10/2018    Procedure: BRONCHOSCOPY FLEXIBLE AND RIGID;  Flexible and Rigid Bronchoscopy with Balloon Dilation, tissue debulking with cryo, and Right mainstem bronchus stent placement;  Surgeon: Wilber Lin MD;  [facility]    BRONCHOSCOPY RIGID N/A 6/6/2018    Procedure: BRONCHOSCOPY RIGID;;  Surgeon: Lopez Macias MD;  [facility]    BRONCHOSCOPY, DILATE BRONCHUS, STENT BRONCHUS, COMBINED N/A 6/11/2018    Procedure: COMBINED BRONCHOSCOPY, DILATE BRONCHUS, STENT BRONCHUS;  Flexible Bronchoscopy, Balloon Dilation, Bronchial Washings;  Surgeon: Wilber Lin MD;  [facility]    BRONCHOSCOPY, DILATE BRONCHUS, STENT BRONCHUS, COMBINED Right 7/10/2018    Procedure: COMBINED BRONCHOSCOPY, DILATE  BRONCHUS, STENT BRONCHUS;  Flexible Bronchoscopy, Balloon Dilation, Bronchial Washings  ;  Surgeon: Wilber Lin MD;  [facility]    BRONCHOSCOPY, DILATE BRONCHUS, STENT BRONCHUS, COMBINED N/A 8/2/2018    Procedure: COMBINED BRONCHOSCOPY, DILATE BRONCHUS, STENT BRONCHUS;  Flexible Bronchoscopy, Bronchial Washings, Balloon Dilation;  Surgeon: Wilber Lin MD;  [facility]    BRONCHOSCOPY, DILATE BRONCHUS, STENT BRONCHUS, COMBINED N/A 8/20/2018    Procedure: COMBINED BRONCHOSCOPY, DILATE BRONCHUS, STENT BRONCHUS;  Flexible Bronchoscopy, Balloon Dilation;  Surgeon: Wilber Lin MD;  [facility]    BRONCHOSCOPY, DILATE BRONCHUS, STENT BRONCHUS, COMBINED N/A 10/29/2018    Procedure: Flexible Bronchoscopy, Balloon Dilation, Stent Revision, Airway Examination And Therapeutic Suctioning, Cyro Tumor Debulking;  Surgeon: Wilber Lin MD;  [facility]    BRONCHOSCOPY, DILATE BRONCHUS, STENT BRONCHUS, COMBINED N/A 11/20/2018    Procedure: Rigid Bronchoscopy, Stent Removal and dilitation;  Surgeon: Wilber Lin MD;  [facility]    BRONCHOSCOPY, DILATE BRONCHUS, STENT BRONCHUS, COMBINED N/A 12/14/2018    Procedure: Flexible And Rigid Bronchoscopy, Balloon Dilation, Bronchial Washing;  Surgeon: Wilber Lin MD;  [facility]    BRONCHOSCOPY, DILATE BRONCHUS, STENT BRONCHUS, COMBINED N/A 1/17/2019    Procedure: Flexible And Rigid Bronchoscopy, Balloon Dilation.;  Surgeon: Wilber Lin MD;  [facility]    BRONCHOSCOPY, DILATE BRONCHUS, STENT BRONCHUS, COMBINED N/A 3/7/2019    Procedure: Flexible and Rigid Bronchoscopy, Bronchial Washing, Balloon Dilation;  Surgeon: Wilber Lin MD;  [facility]    BRONCHOSCOPY, DILATE BRONCHUS, STENT BRONCHUS, COMBINED N/A 6/6/2019    Procedure: Rigid and Flexible Bronchoscopy, Balloon Dilation;  Surgeon: Wilber Lin MD;  [facility]    BRONCHOSCOPY, DILATE BRONCHUS, STENT  BRONCHUS, COMBINED N/A 10/11/2019    Procedure: Flexible and Rigid Bronchoscopy, Balloon Dilation, Bronchoalveolar Lagave;  Surgeon: Wilber Lin MD;  [facility]    CV RIGHT HEART CATH MEASUREMENTS RECORDED N/A 3/10/2020    Procedure: CV RIGHT HEART CATH;  Surgeon: Wai Garcia MD;  [facility]    ENT SURGERY      tonsillectomy as a child    ESOPHAGOSCOPY, GASTROSCOPY, DUODENOSCOPY (EGD), COMBINED N/A 10/29/2018    Procedure: COMBINED ESOPHAGOSCOPY, GASTROSCOPY, DUODENOSCOPY (EGD) with biopsies and polypectomy;  Surgeon: Chente Bloom MD;  [facility]    INSERT EXTRACORPORAL MEMBRANE OXYGENATOR ADULT (OUTSIDE OR) N/A 2/27/2018    Procedure: INSERT EXTRACORPORAL MEMBRANE OXYGENATOR ADULT (OUTSIDE OR);  INSERT EXTRACORPORAL MEMBRANE OXYGENATOR ADULT (OUTSIDE OR) ;  Surgeon: Toby Hernnadez MD;  [facility]    IR CVC TUNNEL PLACEMENT > 5 YRS OF AGE  10/25/2019    IR PARACENTESIS  1/8/2020    IR THORACENTESIS  9/13/2019    IR TRANSCATHETER BIOPSY  1/8/2020    no prior surgery      REMOVE EXTRACORPORAL MEMBRANE OXYGENATOR ADULT N/A 3/3/2018    Procedure: REMOVE EXTRACORPORAL MEMBRANE OXYGENATOR ADULT;  Removal of Right Femoral Venous and Right Axillary Arterial Extracorporeal Membrane Oxygenator;  Surgeon: Toby Hernandez MD;  [facility]    TRANSPLANT LUNG RECIPIENT SINGLE X2 Bilateral 3/1/2018    Procedure: TRANSPLANT LUNG RECIPIENT SINGLE X2;  Median Sternotomy, Extracorporeal Membrane Oxygenator, bilateral sequential lung transplant;  Surgeon: Toby Hernandez MD;  [facility]     Social and Family History:     Social History     Socioeconomic History    Marital status:      Spouse name: Not on file    Number of children: Not on file    Years of education: Not on file    Highest education level: Not on file   Occupational History    Not on file   Social Needs    Financial resource strain: Not on file     Food insecurity     Worry: Not on file     Inability: Not on file    Transportation needs     Medical: Not on file     Non-medical: Not on file   Tobacco Use    Smoking status: Never Smoker    Smokeless tobacco: Never Used   Substance and Sexual Activity    Alcohol use: No     Alcohol/week: 1.0 standard drinks     Types: 1 Glasses of wine per week    Drug use: No    Sexual activity: Not on file   Lifestyle    Physical activity     Days per week: Not on file     Minutes per session: Not on file    Stress: Not on file   Relationships    Social connections     Talks on phone: Not on file     Gets together: Not on file     Attends [employer] service: Not on file     Active member of club or organization: Not on file     Attends meetings of clubs or organizations: Not on file     Relationship status: Not on file    Intimate partner violence     Fear of current or ex partner: Not on file     Emotionally abused: Not on file     Physically abused: Not on file     Forced sexual activity: Not on file   Other Topics Concern    Parent/sibling w/ CABG, MI or angioplasty before 65F 55M? No   Social History Narrative    3/6/2019 - Lives with . Has three daughters. Four grandchildren (two ). No pets. Travelled previously to [facility]. Has visited [place] several times.      Family History   Problem Relation Age of Onset    Hypertension Mother     Arthritis Mother     Pancreatic Cancer Father     Diabetes Father      Allergies and Home Medications:   No Known Allergies  Medications Prior to Admission   Medication Sig Dispense Refill Last Dose    albuterol (PROAIR HFA/PROVENTIL HFA/VENTOLIN HFA) 108 (90 Base) MCG/ACT inhaler Inhale 2 puffs into the lungs every 4 hours as needed for shortness of breath / dyspnea or wheezing 1 Inhaler 1     amLODIPine (NORVASC) 5 MG tablet Take 2 tablets (10 mg) by mouth daily 60 tablet 11     B Complex-C-Folic Acid (DIALYVITE) TABS Take 1 tablet by mouth daily       calcium-vitamin  D (CALTRATE) 600-400 MG-UNIT per tablet Take 1 tablet by mouth daily       carvedilol (COREG) 25 MG tablet Take 1 tablet (25 mg) by mouth 2 times daily (with meals) 60 tablet 11     cefTAZidime (FORTAZ) 1 GM vial Inject 1 g into the vein every 24 hours On Dialysis days Monday and Thursday give Antibiotic AFTER dialysis 70 mL 0     ferrous sulfate (FEROSUL) 325 (65 Fe) MG tablet Take 1 tablet (325 mg) by mouth daily (with breakfast) 60 tablet 2     fluticasone (FLONASE) 50 MCG/ACT nasal spray Spray 1 spray into both nostrils daily 15.8 mL 0     furosemide (LASIX) 40 MG tablet Take 40 mg by mouth daily       lidocaine-prilocaine (EMLA) 2.5-2.5 % external cream Apply 2.5 g topically as needed       magnesium oxide (MAG-OX) 400 MG tablet Take 400 mg by mouth daily        multivitamin, therapeutic with minerals (THERA-VIT-M) TABS tablet Take 1 tablet by mouth daily 30 each 11     pantoprazole (PROTONIX) 40 MG EC tablet Take 1 tablet (40 mg) by mouth daily 30 tablet 11     posaconazole (NOXAFIL) 100 MG EC tablet TAKE THREE TABLETS BY MOUTH TWICE ON THE FIRST DAY OF TREATMENT, THEN TAKE THREE TABLETS DAILY THEREAFTER 90 tablet 2     predniSONE 2.5 MG PO tablet Take two tablets (5mg) every AM and one tablet (2.5mg) every evening 90 tablet 11     SLOW-MAG 71.5-119 MG TBEC Take 3 tablets by mouth daily       tacrolimus (GENERIC EQUIVALENT) 0.5 MG capsule Take 1 capsule (0.5 mg) by mouth every evening Total dose: 1 mg in the AM and 0.5 mg in the PM 30 capsule 11     tacrolimus (GENERIC EQUIVALENT) 1 MG capsule Take 1 capsule (1 mg) by mouth every morning Total dose: 1 mg in the AM and 0.5 mg in the PM 30 capsule 11      Current Scheduled Meds   budesonide  0.5 mg Nebulization BID    famotidine  20 mg Oral or Feeding Tube Daily    heparin ANTICOAGULANT  5,000 Units Subcutaneous Q8H    ipratropium - albuterol 0.5 mg/2.5 mg/3 mL  3 mL Nebulization 4x daily    lactated ringers  500 mL Intravenous Once    methylPREDNISolone  125 mg  Intravenous Q6H    piperacillin-tazobactam  2.25 g Intravenous Q6H    polyethylene glycol  17 g Oral or Feeding Tube Daily    posaconazole (NOXAFIL) intermittent infusion  300 mg Intravenous Daily    [Held by provider] predniSONE  2.5 mg Oral or Feeding Tube QPM    [Held by provider] predniSONE  5 mg Oral or Feeding Tube QAM    tacrolimus  0.5 mg Oral or Feeding Tube QPM    [START ON 1/25/2021] tacrolimus  1 mg Oral or Feeding Tube QAM    vancomycin place jordan - receiving intermittent dosing  1 each Does not apply See Admin Instructions       Physical Exam:     Vital signs:  Temp: 98.2  F (36.8  C) Temp src: Axillary BP: (!) 79/52 Pulse: 96   Resp: 29 SpO2: 100 % O2 Device: Mechanical Ventilator     Weight: 58.6 kg (129 lb 3 oz)  I/O: No intake or output data in the 24 hours ending 01/24/21 1447  HEENT: NCAT.   PULM: Good air flow bilaterally. Coarse breath sounds R>L  CV: Normal S1 and S2. RRR. No murmur, gallop, or rub. No peripheral edema.   ABD: NABS, soft, nontender, nondistended.    SKIN: Warm, dry. No rash on limited exam.   PSYCH: Mood stable.      Lines, Drains, and Devices:  CVC Double Lumen 10/25/19 Right Internal jugular (Active)   Number of days: 457     Results:     LABS    CMP:   Recent Labs   Lab 01/24/21  1458      POTASSIUM 3.7   CHLORIDE 98   CO2 16*   ANIONGAP 20*   *   BUN 48*   CR 3.39*   GFRESTIMATED 14*   GFRESTBLACK 16*   CHELSEY 8.2*   MAG 2.4*   PHOS 6.5*   PROTTOTAL 6.1*   ALBUMIN 2.0*   BILITOTAL 1.2   ALKPHOS 244*   AST 31   ALT 35     CBC:   Recent Labs   Lab 01/24/21  1458   WBC 20.5*   RBC 3.63*   HGB 11.1*   HCT 34.6*   MCV 95   MCH 30.6   MCHC 32.1   RDW 14.7          INR: No lab results found in last 7 days.    Glucose:   Recent Labs   Lab 01/24/21  1458   *   *       Blood Gas:   Recent Labs   Lab 01/24/21  1529   O2PER 100.0       Culture Data No results for input(s): CULT in the last 168 hours.    Virology Data:   Lab Results   Component Value  Date    FLUAH1 Negative 12/23/2020    FLUAH3 Negative 12/23/2020    AL6565 Negative 12/23/2020    IFLUB Negative 12/23/2020    RSVA Negative 12/23/2020    RSVB Negative 12/23/2020    PIV1 Negative 12/23/2020    PIV2 Negative 12/23/2020    PIV3 Negative 12/23/2020    HMPV Negative 12/23/2020    HRVS Negative 12/23/2020    ADVBE Negative 12/23/2020    ADVC Negative 12/23/2020    ADVC Negative 10/07/2019    ADVC Negative 03/07/2019       Historical CMV results (last 3 of prior testing):  Lab Results   Component Value Date    CMVQNT 675 (A) 12/23/2020    CMVQNT <137 (A) 12/09/2020    CMVQNT <137 (A) 09/21/2020     Lab Results   Component Value Date    CMVLOG 2.8 (H) 12/23/2020    CMVLOG <2.1 12/09/2020    CMVLOG <2.1 09/21/2020       Urine Studies    Recent Labs   Lab Test 10/21/19  2240 09/12/19  0125   URINEPH 5.0 7.5*   NITRITE Negative Negative   LEUKEST Large* Negative   WBCU 115* 3       Most Recent Breeze Pulmonary Function Testing (FVC/FEV1 only)  FVC-Pre   Date Value Ref Range Status   12/09/2020 1.12 L    09/09/2020 1.56 L    03/09/2020 1.57 L    02/19/2020 1.78 L      FVC-%Pred-Pre   Date Value Ref Range Status   12/09/2020 34 %    09/09/2020 47 %    03/09/2020 48 %    02/19/2020 54 %      FEV1-Pre   Date Value Ref Range Status   12/09/2020 0.98 L    09/09/2020 1.10 L    03/09/2020 0.96 L    02/19/2020 0.99 L      FEV1-%Pred-Pre   Date Value Ref Range Status   12/09/2020 37 %    09/09/2020 42 %    03/09/2020 37 %    02/19/2020 38 %        IMAGING  CXR 1/23/21 ([facility])  Diffuse multifocal airspace disease appears similar to prior day examination.  Small bilateral pleural effusions.  No pneumothorax.  Heart border is obscured.  Sternotomy changes.  Right axillary surgical clips.  No acute osseous findings.    CT Chest 1/23/21 ([facility])  1. Bilateral ground-glass and nodular consolidative lung infiltrates centrally  distributed in the upper lobes and peripherally distributed in the right  lower  lobe, concerning for atypical pneumonia or opportunistic infection.  Hemorrhage  is in the differential diagnosis.  2. Rim calcified fluid density mass in the left lower lobe, likely reflecting a  chronic lung injury or infection such as an infarct or mycetoma.

## 2021-01-24 NOTE — PROGRESS NOTES
Admitted/transferred from: [facility] @ ~1410  Reason for admission/transfer: transfer to [facility] 2/2 lung transplant status  Patient status upon admission/transfer:  Vented on PEEP of 5 and 50% FiO2, norepinephrine drip to maintain MAP >65, following commands on propofol drip  Interventions: Bronchoscopy   2 RN skin assessment: completed by Jayleen SEN and Keri TAYLOR  Result of skin assessment and interventions/actions: ulcerated area on right side of bridge of nose probably from glasses nose pad, blanchable redness with minor skin breakdown on cocyx/sacral area, and small ulceration on right side of tongue  Height, weight, drug calc weight: done  Patient belongings (see Flowsheet - Adult Profile for details):   MDRO education (if applicable): done

## 2021-01-24 NOTE — PROGRESS NOTES
CLINICAL NUTRITION SERVICES - ASSESSMENT NOTE     Nutrition Prescription    RECOMMENDATIONS FOR MDs/PROVIDERS TO ORDER:  1. Ongoing Lyte Monitor with TF start and advancement , replace as needed   2. Monitor BG and adjust with TF start and advancement, if has diabetes and on insulin, need to adjust insulin    Malnutrition Status:    Unable to assess, minimal information    Recommendations already ordered by Registered Dietitian (RD):  1. Enteral Nutrition support:  --Access: Per RN, patient with an OG tube   --Dosing wt: 59 kg adjusted wt based on admit wt    --Ordered Nutren 1.5,  Initiate @ 15 ml/hr and advance by 10 ml Q 8hr as tolerated to goal @ 45 ml/hr.  Do not start or advance until lytes (Mg++,K+) WNL and phos>1.9    Nutren 1.5 @ 45 mL/hr to provide 1620 kcals (27 kcal/kg/day), 73 gm PRO (1.2 g/kg/day), 821 mL H2O, 190 gm CHO and no Fiber daily.    - Ensure HOB > 30 degrees with gastric feeding   - 30 ml q4hr fluid flushes for tube patency.     - Ordered daily check of K+, Mg, Phos until TF advances to goal rate for evaluation of refeeding syndrome and need for Lyte replacement.    Future/Additional Recommendations:  --If patient with elevated K+/Phos, need to switch TF to Novasource renal   --No MVI ordered, if patient on HD, need to start Nephronex, if on theravit, need to discontinue         REASON FOR ASSESSMENT  Kecia Blue is a/an 60 year old female assessed by the dietitian for Provider Order - Registered Dietitian to Assess and Order TF per Medical Nutrition Therapy Protocol    Chart reviewed:  No H&P, No note in the chart, No labs to review,     NUTRITION HISTORY  Unable to obtain. Per discussion with unit RN, patient is intubated and sedated   Per previous admission notes, patient with history of lung txp and with ESRD on HD    CURRENT NUTRITION ORDERS  Diet: NPO  Allergies: Not verified yet     LABS  No admission labs     MEDICATIONS  Suspect on immunosuppressant meds      ANTHROPOMETRICS  Height: 0 cm (Data Unavailable) - 160 cm   Most Recent Weight: 58.6 kg (129 lb 3 oz)  On 1/24/21  IBW: 52.3 kg ( 112% IBW)  BMI:22.89 kg /m2  Normal BMI  Weight History:   Wt Readings from Last 10 Encounters:   01/24/21 58.6 kg (129 lb 3 oz)   12/23/20 57.6 kg (127 lb)   12/09/20 57.6 kg (127 lb)   11/20/20 56.7 kg (125 lb)   09/09/20 60.8 kg (134 lb)   08/13/20 59 kg (130 lb)   08/06/20 59 kg (130 lb)   06/17/20 56.6 kg (124 lb 11.2 oz)   03/10/20 54.9 kg (121 lb)   03/09/20 54.9 kg (121 lb)       Dosing Weight: 59 kg admit wt ( no notes in the chart, unable to assess EDW)    ASSESSED NUTRITION NEEDS  Estimated Energy Needs: 4453-5489 kcals/day (25 - 30 kcals/kg)  Justification: Maintenance  Estimated Protein Needs: 71-89 grams protein/day (1.2 - 1.5 grams of pro/kg)  Justification: Dialysis  Estimated Fluid Needs: Justification: Per provider pending fluid status    PHYSICAL FINDINGS  See malnutrition section below.    MALNUTRITION  % Intake: Unable to assess  % Weight Loss: None noted  Subcutaneous Fat Loss: Unable to assess  Muscle Loss: Unable to assess  Fluid Accumulation/Edema: Unable to assess  Malnutrition Diagnosis: Unable to determine due to lack of information    NUTRITION DIAGNOSIS  Inadequate oral intake related to suspect respiratory failure, hindering ability to advance diet as evidenced by NPO status today, plan to start TF?    INTERVENTIONS  Implementation  Nutrition Education: Not appropriate at this time due to patient condition   Enteral Nutrition - Initiate  Feeding tube flush     Goals  Total avg nutritional intake to meet a minimum of 25 kcal/kg and 1.2 gm PRO/kg daily (per dosing wt 59 kg admit wt ).     Monitoring/Evaluation  Progress toward goals will be monitored and evaluated per protocol.    Natan Pearson RD/STEPHANY  Weekend / Relief RD Pager [phone]

## 2021-01-24 NOTE — PROGRESS NOTES
"Bronchoscopy Risk Assessment Guidelines      A. Patient symptoms to consider when assessing pulmonary TB risk are:    I. Cough greater than 3 weeks; and fever, hemoptysis, pleuritic chest    pain, weight loss greater than 10 lbs, night sweats, fatigue, infiltrates on    upper lobes or superior segments of lower lobes, cavitation on chest    x-ray.   B. Patient risk factors to consider when assessing pulmonary TB risk are:    I. Exposure to known TB case, foreign-born persons (within 5 years of    arrival to [place]), residence in a crowded setting (correctional facility,     long-term care center, etc.), persons with HIV or immunosuppression.    Patients with symptoms and risk factors should generally be considered \"suspect risk\" and bronchoscopies should be performed in airborne precautions.    This patient has NO KNOWN RISK of Tuberculosis (proceed with bronchoscopy)    Specimens sent: yes  Complications: None  Scope used: #33 Slim, # 75  Attending Physician: Dr. Shelia Dupree, RT on 1/24/2021 at 4:18 PM  "

## 2021-01-24 NOTE — PROCEDURES
Bronchoscopy    PROCEDURE:   Bronchoscopy with RUL apical segement Bronchoalveolar lavage    INDICATION:  Transplant, immunosuppressed, new AHRF w/diffuse opacities    PROCEDURE :   Yudelka Cueto MD     CONSENT:  Obtained and in the paper chart    UNIVERSAL PROTOCOL: Patient Identification was verified, time out was performed. Imaging data reviewed. Barrier precaution done: Hands washed, mask, gloves, gown, and eye protection all used.     MEDICATIONS: On propofol gtt for sedation     PROCEDURE: Patient monitored during entire procedure. 3mL of lidocaine instilled via ET tube with minimal cough.  Flexible bronchoscope was inserted through patients ET tube.  The ET tube was in low position.  The gee was sharp.  An airway inspection was done to the subsegmental level. B/L anastamosis sites appeared normal except for mild stenosis of the R main stem. There were copious, thick, mucoid secretions throughout the RML and RLL. There were minimal secretions in the L lung. The bronchoscope was then advanced to the RUL apical segment and placed into wedge.  A BAL was performed where  120mL of saline were instilled in 60mL aliquots.  A total of 50mL were collected.       SAMPLES:   50 mL of cloudy fluid were sent for microscopic and cytologic analysis.    COMPLICATIONS: None    Yudelka Cueto MD  Pulmonary and Critical Care

## 2021-01-24 NOTE — H&P
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 She was transferred to OSH ICU 1/22 and intubated and requiring vasopressors. 1/24 transferred to [facility] Medical ICU service for ongoing management.    CHANGES and MAJOR THINGS TODAY:   - Admit to ICU  - Patient intubated, current settings appropriate, wean FiO2 as able  - Versed and fentanyl for sedation, wean off propofol  - Levo and vaso ordered for BP support, will also order stress dose steroids, TTE to evaluate cardiac function  - Bronch completed, labs pending  - F/U other micro w/u BCx, UA with reflex, legionella, strep, histo, CMV, RPP  - Continue antibiotic treatment with vanc, zosyn, bactrim (steno history), and posaconazole (chronic aspergillus)   - Appreciate pulmonary transplant team recs  - Renal consulted for dialysis     PLAN:    Neuro:  # Pain and sedation  - versed and Fentanyl for sedation, wean off propofol  - RASS goal -1 to -2    Pulmonary:  # Acute Hypoxic Respiratory Failure  Suspected 2/2 pneumonia. OSH CT 1/23 + bilateral ground glass opacities and consolidative lung infiltrates centrally distributed to the upper lobes and RLL, . Covid-19 negative X3 at OSH.   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  - Continue PTA tacrolimus, AM level ordered  - Hold PTA prednisone while on solumedrol  - Pulmonary lung transplant team consulted, appreciate recs    Cardiovascular:  # Shock  # HX Hypertension  Patient was transferred on Levophed. Right heart catheterization 03/10/2020 concluded right sided filling pressures mildly elevated, mild PHTN, mild right sided filling pressures, and normal cardiac output. Last ECHO 10/21/20 with EF 60-65%, normal LV & RV function.   - Levophed and vaso ordered Map goal > 65  - Stress dose solumerol ordered  - Will repeat TTE  - Lactic Acid pending  - Hold PTA coreg and amlodipine     GI/Nutrition:  #Portal HTN  Liver biopsy positive for congestive hepatopathy. Previous had ascites thought to be r/t elevated right sided heart pressures. Last saw GI on 12/21/20 and patient endorsed that her ascites is no longer present.   - NTD    #Nutrition  - RD consulted for Tube Feeds  - Gastric tube in place     Renal/Fluids/Electrolytes:  # ESRD on iHD twice weekly (M/Th).   # Anion gap metabolic acidosis r/t ESRD, uremia (OSH ABG 7.31/37/120/19 & Anion Gap 25.7)   Last dialyzed 1/22 in [place]. Outpatient dry body weight 56.5 kg.   - Nephrology Consulted   - I&O  - Daily Weights   - Trend BMP     Endocrine:  # Seronegative RA with Raynaud's   - NTD, monitor     High intensity sliding scale insulin ordered given stress dose steroids    ID:  # Sepsis   Suspected related to bacterial pneumonia. OSH CT 1/23 + bilateral ground glass opacities and consolidative lung infiltrates centrally  distributed to the upper lobes and RLL. Covid-19 negative X3 at OSH. Procalcitonin negative.   - Bronch completed, labs pending  - F/U other micro w/u BCx, UA with reflex, legionella, strep, histo, CMV, RPP  - Continue antibiotic treatment with vanc, zosyn, and bactrim (steno history)    # Chronic/Stable right aspergillus empyema   - Continue PTA posaconazole     Hematology:    # Anemia r/t renal disease   # Thrombocytopenia r/t critical illness   Takes aranesp 25 mcg every four weeks, last dose 01/14.    - Daily CBC    Musculoskeletal:  - PT/OT when appropriate     Skin:  # Dermatomyositis antitryptase syndrome   - Noted, NTD    General Cares/Prophylaxis:    DVT Prophylaxis: Heparin SQ  GI Prophylaxis: H2 Blocker  Restraints: Not indicated  Family Communication: Will update  via telephone for bronch.   Code Status: Full     Lines/tubes/drains:  - Basilio  - CVC  - PIVs    Disposition:  - Medical ICU    Patient seen and findings/plan discussed with medical ICU staff, Dr. Bass.    Tomas Rolle MD  MICU 1 Service  Internal Medicine PGY-1  P. 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 She was transferred to OSH ICU 1/22 and intubated and requiring vasopressors. 1/24 transferred to [facility] Medical ICU service for ongoing management.    REVIEW OF SYSTEMS: Patient is intubated     PAST MEDICAL HISTORY:   Past Medical History:   Diagnosis Date     Abdominal pain 10/6/2019     Acute on chronic respiratory failure with hypoxia (H) 2/21/2018      Antisynthetase syndrome (H) 2014     Chronic cough      Chronic infection     recent C diff  8/18     Dehydration 8/1/2018     Dehydration 8/1/2018     Dermatomyositis (H)      Dysplasia of cervix, low grade (ESTRADA 1)      ILD (interstitial lung disease) (H)      Nausea and vomiting 12/4/2018     Osteopenia      PONV (postoperative nausea and vomiting)      Pulmonary hypertension (H)      Raynaud's disease      Seronegative rheumatoid arthritis (H)      SURGICAL HISTORY:  Past Surgical History:   Procedure Laterality Date     BRONCHOSCOPY (RIGID OR FLEXIBLE), DIAGNOSTIC N/A 4/10/2018    Procedure: COMBINED BRONCHOSCOPY (RIGID OR FLEXIBLE), LAVAGE;;  Surgeon: Mariposa Donohue MD;  [facility]     BRONCHOSCOPY (RIGID OR FLEXIBLE), DIAGNOSTIC N/A 12/23/2020    Procedure: BRONCHOSCOPY, WITH BRONCHOALVEOLAR LAVAGE;  Surgeon: Uri Bass MD;  [facility]     BRONCHOSCOPY (RIGID OR FLEXIBLE), DILATE BRONCHUS / TRACHEA N/A 10/11/2018    Procedure: BRONCHOSCOPY (RIGID OR FLEXIBLE), DILATE BRONCHUS / TRACHEA;  Flexible And Rigid Bronchoscopy and Dilation;  Surgeon: Wilber Lin MD;  [facility]     BRONCHOSCOPY FLEXIBLE N/A 3/13/2018    Procedure: BRONCHOSCOPY FLEXIBLE;  Flexible Bronchoscopy ;  Surgeon: Gissell Sanchez MD;  [facility]     BRONCHOSCOPY FLEXIBLE N/A 5/9/2018    Procedure: BRONCHOSCOPY FLEXIBLE;;  Surgeon: Wilber Lin MD;  [facility]     BRONCHOSCOPY FLEXIBLE AND RIGID N/A 9/10/2018    Procedure: BRONCHOSCOPY FLEXIBLE AND RIGID;  Flexible and Rigid Bronchoscopy with Balloon Dilation, tissue debulking with cryo, and Right mainstem bronchus stent placement;  Surgeon: Wilber Lin MD;  [facility]     BRONCHOSCOPY RIGID N/A 6/6/2018    Procedure: BRONCHOSCOPY RIGID;;  Surgeon: Lopez Macias MD;  [facility]     BRONCHOSCOPY, DILATE BRONCHUS, STENT BRONCHUS, COMBINED N/A 6/11/2018    Procedure: COMBINED BRONCHOSCOPY, DILATE  BRONCHUS, STENT BRONCHUS;  Flexible Bronchoscopy, Balloon Dilation, Bronchial Washings;  Surgeon: Wilber Lin MD;  [facility]     BRONCHOSCOPY, DILATE BRONCHUS, STENT BRONCHUS, COMBINED Right 7/10/2018    Procedure: COMBINED BRONCHOSCOPY, DILATE BRONCHUS, STENT BRONCHUS;  Flexible Bronchoscopy, Balloon Dilation, Bronchial Washings  ;  Surgeon: Wilber Lin MD;  [facility]     BRONCHOSCOPY, DILATE BRONCHUS, STENT BRONCHUS, COMBINED N/A 8/2/2018    Procedure: COMBINED BRONCHOSCOPY, DILATE BRONCHUS, STENT BRONCHUS;  Flexible Bronchoscopy, Bronchial Washings, Balloon Dilation;  Surgeon: Wilber Lin MD;  [facility]     BRONCHOSCOPY, DILATE BRONCHUS, STENT BRONCHUS, COMBINED N/A 8/20/2018    Procedure: COMBINED BRONCHOSCOPY, DILATE BRONCHUS, STENT BRONCHUS;  Flexible Bronchoscopy, Balloon Dilation;  Surgeon: Wilber Lin MD;  [facility]     BRONCHOSCOPY, DILATE BRONCHUS, STENT BRONCHUS, COMBINED N/A 10/29/2018    Procedure: Flexible Bronchoscopy, Balloon Dilation, Stent Revision, Airway Examination And Therapeutic Suctioning, Cyro Tumor Debulking;  Surgeon: Wilber Lin MD;  [facility]     BRONCHOSCOPY, DILATE BRONCHUS, STENT BRONCHUS, COMBINED N/A 11/20/2018    Procedure: Rigid Bronchoscopy, Stent Removal and dilitation;  Surgeon: Wilber Lin MD;  [facility]     BRONCHOSCOPY, DILATE BRONCHUS, STENT BRONCHUS, COMBINED N/A 12/14/2018    Procedure: Flexible And Rigid Bronchoscopy, Balloon Dilation, Bronchial Washing;  Surgeon: Wilber Lin MD;  [facility]     BRONCHOSCOPY, DILATE BRONCHUS, STENT BRONCHUS, COMBINED N/A 1/17/2019    Procedure: Flexible And Rigid Bronchoscopy, Balloon Dilation.;  Surgeon: Wilber Lin MD;  [facility]     BRONCHOSCOPY, DILATE BRONCHUS, STENT BRONCHUS, COMBINED N/A 3/7/2019    Procedure: Flexible and Rigid Bronchoscopy, Bronchial Washing, Balloon Dilation;  Surgeon: Delia  Wilber Warner MD;  [facility]     BRONCHOSCOPY, DILATE BRONCHUS, STENT BRONCHUS, COMBINED N/A 6/6/2019    Procedure: Rigid and Flexible Bronchoscopy, Balloon Dilation;  Surgeon: Wilber Lin MD;  [facility]     BRONCHOSCOPY, DILATE BRONCHUS, STENT BRONCHUS, COMBINED N/A 10/11/2019    Procedure: Flexible and Rigid Bronchoscopy, Balloon Dilation, Bronchoalveolar Lagave;  Surgeon: Wilber Lin MD;  [facility]     CV RIGHT HEART CATH MEASUREMENTS RECORDED N/A 3/10/2020    Procedure: CV RIGHT HEART CATH;  Surgeon: Wai Garcia MD;  [facility]     ENT SURGERY      tonsillectomy as a child     ESOPHAGOSCOPY, GASTROSCOPY, DUODENOSCOPY (EGD), COMBINED N/A 10/29/2018    Procedure: COMBINED ESOPHAGOSCOPY, GASTROSCOPY, DUODENOSCOPY (EGD) with biopsies and polypectomy;  Surgeon: Chente Bloom MD;  [facility]     INSERT EXTRACORPORAL MEMBRANE OXYGENATOR ADULT (OUTSIDE OR) N/A 2/27/2018    Procedure: INSERT EXTRACORPORAL MEMBRANE OXYGENATOR ADULT (OUTSIDE OR);  INSERT EXTRACORPORAL MEMBRANE OXYGENATOR ADULT (OUTSIDE OR) ;  Surgeon: Toby Hernandez MD;  [facility]     IR CVC TUNNEL PLACEMENT > 5 YRS OF AGE  10/25/2019     IR PARACENTESIS  1/8/2020     IR THORACENTESIS  9/13/2019     IR TRANSCATHETER BIOPSY  1/8/2020     no prior surgery       REMOVE EXTRACORPORAL MEMBRANE OXYGENATOR ADULT N/A 3/3/2018    Procedure: REMOVE EXTRACORPORAL MEMBRANE OXYGENATOR ADULT;  Removal of Right Femoral Venous and Right Axillary Arterial Extracorporeal Membrane Oxygenator;  Surgeon: Toby Hernandez MD;  [facility]     TRANSPLANT LUNG RECIPIENT SINGLE X2 Bilateral 3/1/2018    Procedure: TRANSPLANT LUNG RECIPIENT SINGLE X2;  Median Sternotomy, Extracorporeal Membrane Oxygenator, bilateral sequential lung transplant;  Surgeon: Toby Hernandez MD;  [facility]     SOCIAL HISTORY:  Social History     Socioeconomic History      Marital status:      Spouse name: Not on file     Number of children: Not on file     Years of education: Not on file     Highest education level: Not on file   Occupational History     Not on file   Social Needs     Financial resource strain: Not on file     Food insecurity     Worry: Not on file     Inability: Not on file     Transportation needs     Medical: Not on file     Non-medical: Not on file   Tobacco Use     Smoking status: Never Smoker     Smokeless tobacco: Never Used   Substance and Sexual Activity     Alcohol use: No     Alcohol/week: 1.0 standard drinks     Types: 1 Glasses of wine per week     Drug use: No     Sexual activity: Not on file   Lifestyle     Physical activity     Days per week: Not on file     Minutes per session: Not on file     Stress: Not on file   Relationships     Social connections     Talks on phone: Not on file     Gets together: Not on file     Attends Jain service: Not on file     Active member of club or organization: Not on file     Attends meetings of clubs or organizations: Not on file     Relationship status: Not on file     Intimate partner violence     Fear of current or ex partner: Not on file     Emotionally abused: Not on file     Physically abused: Not on file     Forced sexual activity: Not on file   Other Topics Concern     Parent/sibling w/ CABG, MI or angioplasty before 65F 55M? No   Social History Narrative    3/6/2019 - Lives with . Has three daughters. Four grandchildren (two ). No pets. Travelled previously to [facility]. Has visited [place] several times.      FAMILY HISTORY:   Family History   Problem Relation Age of Onset     Hypertension Mother      Arthritis Mother      Pancreatic Cancer Father      Diabetes Father      ALLERGIES:   No Known Allergies  MEDICATIONS:  No current facility-administered medications on file prior to encounter.        albuterol (PROAIR HFA/PROVENTIL HFA/VENTOLIN HFA) 108 (90 Base) MCG/ACT  inhaler, Inhale 2 puffs into the lungs every 4 hours as needed for shortness of breath / dyspnea or wheezing       amLODIPine (NORVASC) 5 MG tablet, Take 2 tablets (10 mg) by mouth daily       B Complex-C-Folic Acid (DIALYVITE) TABS, Take 1 tablet by mouth daily       calcium-vitamin D (CALTRATE) 600-400 MG-UNIT per tablet, Take 1 tablet by mouth daily       carvedilol (COREG) 25 MG tablet, Take 1 tablet (25 mg) by mouth 2 times daily (with meals)       cefTAZidime (FORTAZ) 1 GM vial, Inject 1 g into the vein every 24 hours On Dialysis days Monday and Thursday give Antibiotic AFTER dialysis       ferrous sulfate (FEROSUL) 325 (65 Fe) MG tablet, Take 1 tablet (325 mg) by mouth daily (with breakfast)       fluticasone (FLONASE) 50 MCG/ACT nasal spray, Spray 1 spray into both nostrils daily       furosemide (LASIX) 40 MG tablet, Take 40 mg by mouth daily       lidocaine-prilocaine (EMLA) 2.5-2.5 % external cream, Apply 2.5 g topically as needed       magnesium oxide (MAG-OX) 400 MG tablet, Take 400 mg by mouth daily        multivitamin, therapeutic with minerals (THERA-VIT-M) TABS tablet, Take 1 tablet by mouth daily       pantoprazole (PROTONIX) 40 MG EC tablet, Take 1 tablet (40 mg) by mouth daily       posaconazole (NOXAFIL) 100 MG EC tablet, TAKE THREE TABLETS BY MOUTH TWICE ON THE FIRST DAY OF TREATMENT, THEN TAKE THREE TABLETS DAILY THEREAFTER       predniSONE 2.5 MG PO tablet, Take two tablets (5mg) every AM and one tablet (2.5mg) every evening       SLOW-MAG 71.5-119 MG TBEC, Take 3 tablets by mouth daily       tacrolimus (GENERIC EQUIVALENT) 0.5 MG capsule, Take 1 capsule (0.5 mg) by mouth every evening Total dose: 1 mg in the AM and 0.5 mg in the PM       tacrolimus (GENERIC EQUIVALENT) 1 MG capsule, Take 1 capsule (1 mg) by mouth every morning Total dose: 1 mg in the AM and 0.5 mg in the PM        PHYSICAL EXAMINATION:  Temp:  [98.2  F (36.8  C)] 98.2  F (36.8  C)  Pulse:  [75-98] 77  Resp:  [24-29] 29  BP:  (77-94)/(52-62) 84/52  FiO2 (%):  [50 %] 50 %  SpO2:  [97 %-100 %] 98 %  General: Laying in bed. No acute distress.   HEENT: PERRLA. Following commands. Rass -1 on propofol. No focal deficits.  Neuro: A&Ox3, NAD  Pulm/Resp: Breath sounds coarse/dim throughout  CV: RRR  Abdomen: Soft, non-distended, non-tender  :  rivas catheter in place, urine yellow and clear  Incisions/Skin: Left AV fistula with thrill and bruit    LABS: Reviewed.   Arterial Blood Gases   Recent Labs   Lab 01/24/21  1529   PH 7.36   PCO2 29*   PO2 177*   HCO3 16*     Complete Blood Count   Recent Labs   Lab 01/24/21  1458   WBC 20.5*   HGB 11.1*        Basic Metabolic Panel  Recent Labs   Lab 01/24/21  1458      POTASSIUM 3.7   CHLORIDE 98   CO2 16*   BUN 48*   CR 3.39*   *     Liver Function Tests  Recent Labs   Lab 01/24/21  1458   AST 31   ALT 35   ALKPHOS 244*   BILITOTAL 1.2   ALBUMIN 2.0*     Coagulation Profile  No lab results found in last 7 days.    IMAGING:  Recent Results (from the past 24 hour(s))   XR Chest Port 1 View    Narrative    EXAM: XR CHEST PORT 1 VW  1/24/2021 3:08 PM     HISTORY:  s/p b/l lung transplant w/new HRF, also confirm line/tube  placement       COMPARISON:  Chest x-ray 12/9/2020    FINDINGS:   Portable supine view of the chest. Post surgical changes of bilateral  lung transplant with mediastinal surgical clips and intact medial  sternotomy wires. Endotracheal tube tip projects over the low thoracic  trachea. Right internal jugular central venous catheter tip projects  at the superior cavoatrial junction. Enteric tube tip and sidehole  projected within the stomach.    The trachea is midline. Cardiac silhouette is within normal limits.  There patchy airspace opacities bilaterally. Small right pleural  effusion with overlying basilar opacities. No pneumothorax.      Impression    IMPRESSION:   1. Endotracheal tube tip projects over the low thoracic trachea.  2. Right internal jugular central  venous catheter tip projects over  the superior cavoatrial junction.  2. Enteric tube tip and sidehole projected within the stomach.  4. Patchy airspace opacities bilaterally, suggestive of edema versus  infection.  5. Small right pleural effusion.    I have personally reviewed the examination and initial interpretation  and I agree with the findings.    MEHNAZ CHAPMAN MD

## 2021-01-25 ENCOUNTER — RESULTS ONLY (OUTPATIENT)
Dept: OTHER | Facility: CLINIC | Age: 61
End: 2021-01-25

## 2021-01-25 ENCOUNTER — APPOINTMENT (OUTPATIENT)
Dept: PHYSICAL THERAPY | Facility: CLINIC | Age: 61
End: 2021-01-25
Attending: NURSE PRACTITIONER
Payer: COMMERCIAL

## 2021-01-25 ENCOUNTER — APPOINTMENT (OUTPATIENT)
Dept: GENERAL RADIOLOGY | Facility: CLINIC | Age: 61
End: 2021-01-25
Attending: INTERNAL MEDICINE
Payer: COMMERCIAL

## 2021-01-25 ENCOUNTER — APPOINTMENT (OUTPATIENT)
Dept: OCCUPATIONAL THERAPY | Facility: CLINIC | Age: 61
End: 2021-01-25
Attending: NURSE PRACTITIONER
Payer: COMMERCIAL

## 2021-01-25 ENCOUNTER — APPOINTMENT (OUTPATIENT)
Dept: GENERAL RADIOLOGY | Facility: CLINIC | Age: 61
End: 2021-01-25
Attending: NURSE PRACTITIONER
Payer: COMMERCIAL

## 2021-01-25 ENCOUNTER — APPOINTMENT (OUTPATIENT)
Dept: CARDIOLOGY | Facility: CLINIC | Age: 61
End: 2021-01-25
Attending: INTERNAL MEDICINE
Payer: COMMERCIAL

## 2021-01-25 LAB
ANION GAP SERPL CALCULATED.3IONS-SCNC: 17 MMOL/L (ref 3–14)
APPEARANCE FLD: NORMAL
BACTERIA SPEC CULT: NO GROWTH
BASE DEFICIT BLDV-SCNC: 6.9 MMOL/L
BUN SERPL-MCNC: 58 MG/DL (ref 7–30)
CALCIUM SERPL-MCNC: 8.1 MG/DL (ref 8.5–10.1)
CHLORIDE SERPL-SCNC: 101 MMOL/L (ref 94–109)
CMV DNA SPEC NAA+PROBE-ACNC: 238 [IU]/ML
CMV DNA SPEC NAA+PROBE-ACNC: <137 [IU]/ML
CMV DNA SPEC NAA+PROBE-LOG#: 2.4 {LOG_IU}/ML
CMV DNA SPEC NAA+PROBE-LOG#: <2.1 {LOG_IU}/ML
CO2 SERPL-SCNC: 16 MMOL/L (ref 20–32)
COLOR FLD: YELLOW
COPATH REPORT: NORMAL
CREAT SERPL-MCNC: 3.73 MG/DL (ref 0.52–1.04)
ERYTHROCYTE [DISTWIDTH] IN BLOOD BY AUTOMATED COUNT: 14.4 % (ref 10–15)
FLUAV H1 2009 PAND RNA SPEC QL NAA+PROBE: NEGATIVE
FLUAV H1 RNA SPEC QL NAA+PROBE: NEGATIVE
FLUAV H3 RNA SPEC QL NAA+PROBE: NEGATIVE
FLUAV RNA SPEC QL NAA+PROBE: NEGATIVE
FLUBV RNA SPEC QL NAA+PROBE: NEGATIVE
GFR SERPL CREATININE-BSD FRML MDRD: 13 ML/MIN/{1.73_M2}
GLUCOSE BLDC GLUCOMTR-MCNC: 148 MG/DL (ref 70–99)
GLUCOSE BLDC GLUCOMTR-MCNC: 160 MG/DL (ref 70–99)
GLUCOSE BLDC GLUCOMTR-MCNC: 171 MG/DL (ref 70–99)
GLUCOSE BLDC GLUCOMTR-MCNC: 180 MG/DL (ref 70–99)
GLUCOSE BLDC GLUCOMTR-MCNC: 202 MG/DL (ref 70–99)
GLUCOSE BLDC GLUCOMTR-MCNC: 242 MG/DL (ref 70–99)
GLUCOSE FLD-MCNC: 72 MG/DL
GLUCOSE SERPL-MCNC: 187 MG/DL (ref 70–99)
GRAM STN SPEC: NORMAL
GRAM STN SPEC: NORMAL
HADV DNA SPEC QL NAA+PROBE: NEGATIVE
HADV DNA SPEC QL NAA+PROBE: NEGATIVE
HCO3 BLDV-SCNC: 17 MMOL/L (ref 21–28)
HCT VFR BLD AUTO: 33.4 % (ref 35–47)
HGB BLD-MCNC: 10.9 G/DL (ref 11.7–15.7)
HMPV RNA SPEC QL NAA+PROBE: NEGATIVE
HPIV1 RNA SPEC QL NAA+PROBE: NEGATIVE
HPIV2 RNA SPEC QL NAA+PROBE: NEGATIVE
HPIV3 RNA SPEC QL NAA+PROBE: NEGATIVE
LDH FLD L TO P-CCNC: 6308 U/L
MAGNESIUM SERPL-MCNC: 2.5 MG/DL (ref 1.6–2.3)
MCH RBC QN AUTO: 30.4 PG (ref 26.5–33)
MCHC RBC AUTO-ENTMCNC: 32.6 G/DL (ref 31.5–36.5)
MCV RBC AUTO: 93 FL (ref 78–100)
MICROBIOLOGIST REVIEW: NORMAL
MONOS+MACROS NFR FLD MANUAL: 13 %
NEUTS BAND NFR FLD MANUAL: 87 %
O2/TOTAL GAS SETTING VFR VENT: 50 %
OXYHGB MFR BLDV: 83 %
PCO2 BLDV: 31 MM HG (ref 40–50)
PH BLDV: 7.37 PH (ref 7.32–7.43)
PHOSPHATE SERPL-MCNC: 6.8 MG/DL (ref 2.5–4.5)
PLATELET # BLD AUTO: 167 10E9/L (ref 150–450)
PO2 BLDV: 54 MM HG (ref 25–47)
POTASSIUM SERPL-SCNC: 4.1 MMOL/L (ref 3.4–5.3)
PROT FLD-MCNC: 3.4 G/DL
RBC # BLD AUTO: 3.59 10E12/L (ref 3.8–5.2)
RHINOVIRUS RNA SPEC QL NAA+PROBE: NEGATIVE
RSV RNA SPEC QL NAA+PROBE: NEGATIVE
RSV RNA SPEC QL NAA+PROBE: NEGATIVE
SODIUM SERPL-SCNC: 135 MMOL/L (ref 133–144)
SPECIMEN SOURCE FLD: NORMAL
SPECIMEN SOURCE: ABNORMAL
SPECIMEN SOURCE: ABNORMAL
SPECIMEN SOURCE: NORMAL
TACROLIMUS BLD-MCNC: 8.2 UG/L (ref 5–15)
TME LAST DOSE: NORMAL H
VANCOMYCIN SERPL-MCNC: 15.1 MG/L
WBC # BLD AUTO: 12.5 10E9/L (ref 4–11)
WBC # FLD AUTO: 660 /UL

## 2021-01-25 PROCEDURE — 272N000078 HC NUTRITION PRODUCT INTERMEDIATE LITER

## 2021-01-25 PROCEDURE — 250N000011 HC RX IP 250 OP 636: Performed by: STUDENT IN AN ORGANIZED HEALTH CARE EDUCATION/TRAINING PROGRAM

## 2021-01-25 PROCEDURE — 97165 OT EVAL LOW COMPLEX 30 MIN: CPT | Mod: GO

## 2021-01-25 PROCEDURE — 80202 ASSAY OF VANCOMYCIN: CPT | Performed by: INTERNAL MEDICINE

## 2021-01-25 PROCEDURE — 250N000013 HC RX MED GY IP 250 OP 250 PS 637: Performed by: NURSE PRACTITIONER

## 2021-01-25 PROCEDURE — 999N000157 HC STATISTIC RCP TIME EA 10 MIN

## 2021-01-25 PROCEDURE — 250N000009 HC RX 250: Performed by: NURSE PRACTITIONER

## 2021-01-25 PROCEDURE — 250N000012 HC RX MED GY IP 250 OP 636 PS 637: Performed by: NURSE PRACTITIONER

## 2021-01-25 PROCEDURE — 82810 BLOOD GASES O2 SAT ONLY: CPT | Performed by: STUDENT IN AN ORGANIZED HEALTH CARE EDUCATION/TRAINING PROGRAM

## 2021-01-25 PROCEDURE — 999N001014 HC STATISTIC CYTO WRIGHT STAIN TC: Performed by: STUDENT IN AN ORGANIZED HEALTH CARE EDUCATION/TRAINING PROGRAM

## 2021-01-25 PROCEDURE — 250N000011 HC RX IP 250 OP 636: Performed by: INTERNAL MEDICINE

## 2021-01-25 PROCEDURE — 80048 BASIC METABOLIC PNL TOTAL CA: CPT | Performed by: STUDENT IN AN ORGANIZED HEALTH CARE EDUCATION/TRAINING PROGRAM

## 2021-01-25 PROCEDURE — 88305 TISSUE EXAM BY PATHOLOGIST: CPT | Mod: TC | Performed by: STUDENT IN AN ORGANIZED HEALTH CARE EDUCATION/TRAINING PROGRAM

## 2021-01-25 PROCEDURE — 86833 HLA CLASS II HIGH DEFIN QUAL: CPT | Performed by: INTERNAL MEDICINE

## 2021-01-25 PROCEDURE — 87070 CULTURE OTHR SPECIMN AEROBIC: CPT | Performed by: STUDENT IN AN ORGANIZED HEALTH CARE EDUCATION/TRAINING PROGRAM

## 2021-01-25 PROCEDURE — 999N001018 HC STATISTIC H-CELL BLOCK W/STAIN: Performed by: STUDENT IN AN ORGANIZED HEALTH CARE EDUCATION/TRAINING PROGRAM

## 2021-01-25 PROCEDURE — 999N000065 XR CHEST PORT 1 VW

## 2021-01-25 PROCEDURE — 82945 GLUCOSE OTHER FLUID: CPT | Performed by: STUDENT IN AN ORGANIZED HEALTH CARE EDUCATION/TRAINING PROGRAM

## 2021-01-25 PROCEDURE — 94668 MNPJ CHEST WALL SBSQ: CPT

## 2021-01-25 PROCEDURE — 999N000015 HC STATISTIC ARTERIAL MONITORING DAILY

## 2021-01-25 PROCEDURE — 87206 SMEAR FLUORESCENT/ACID STAI: CPT | Performed by: STUDENT IN AN ORGANIZED HEALTH CARE EDUCATION/TRAINING PROGRAM

## 2021-01-25 PROCEDURE — 71045 X-RAY EXAM CHEST 1 VIEW: CPT

## 2021-01-25 PROCEDURE — 258N000003 HC RX IP 258 OP 636: Performed by: STUDENT IN AN ORGANIZED HEALTH CARE EDUCATION/TRAINING PROGRAM

## 2021-01-25 PROCEDURE — 250N000012 HC RX MED GY IP 250 OP 636 PS 637: Performed by: INTERNAL MEDICINE

## 2021-01-25 PROCEDURE — 71045 X-RAY EXAM CHEST 1 VIEW: CPT | Mod: 26

## 2021-01-25 PROCEDURE — 250N000013 HC RX MED GY IP 250 OP 250 PS 637: Performed by: STUDENT IN AN ORGANIZED HEALTH CARE EDUCATION/TRAINING PROGRAM

## 2021-01-25 PROCEDURE — 88112 CYTOPATH CELL ENHANCE TECH: CPT | Mod: 26 | Performed by: PATHOLOGY

## 2021-01-25 PROCEDURE — 82785 ASSAY OF IGE: CPT | Performed by: STUDENT IN AN ORGANIZED HEALTH CARE EDUCATION/TRAINING PROGRAM

## 2021-01-25 PROCEDURE — 71045 X-RAY EXAM CHEST 1 VIEW: CPT | Mod: 26 | Performed by: RADIOLOGY

## 2021-01-25 PROCEDURE — 99233 SBSQ HOSP IP/OBS HIGH 50: CPT | Mod: 25 | Performed by: INTERNAL MEDICINE

## 2021-01-25 PROCEDURE — 87015 SPECIMEN INFECT AGNT CONCNTJ: CPT | Performed by: STUDENT IN AN ORGANIZED HEALTH CARE EDUCATION/TRAINING PROGRAM

## 2021-01-25 PROCEDURE — 93306 TTE W/DOPPLER COMPLETE: CPT | Mod: 26 | Performed by: INTERNAL MEDICINE

## 2021-01-25 PROCEDURE — 82803 BLOOD GASES ANY COMBINATION: CPT | Performed by: STUDENT IN AN ORGANIZED HEALTH CARE EDUCATION/TRAINING PROGRAM

## 2021-01-25 PROCEDURE — 86832 HLA CLASS I HIGH DEFIN QUAL: CPT | Performed by: INTERNAL MEDICINE

## 2021-01-25 PROCEDURE — 83615 LACTATE (LD) (LDH) ENZYME: CPT | Performed by: STUDENT IN AN ORGANIZED HEALTH CARE EDUCATION/TRAINING PROGRAM

## 2021-01-25 PROCEDURE — 94640 AIRWAY INHALATION TREATMENT: CPT | Mod: 76

## 2021-01-25 PROCEDURE — 258N000003 HC RX IP 258 OP 636: Performed by: NURSE PRACTITIONER

## 2021-01-25 PROCEDURE — 87075 CULTR BACTERIA EXCEPT BLOOD: CPT | Performed by: STUDENT IN AN ORGANIZED HEALTH CARE EDUCATION/TRAINING PROGRAM

## 2021-01-25 PROCEDURE — 80197 ASSAY OF TACROLIMUS: CPT | Performed by: STUDENT IN AN ORGANIZED HEALTH CARE EDUCATION/TRAINING PROGRAM

## 2021-01-25 PROCEDURE — 83735 ASSAY OF MAGNESIUM: CPT | Performed by: STUDENT IN AN ORGANIZED HEALTH CARE EDUCATION/TRAINING PROGRAM

## 2021-01-25 PROCEDURE — 87799 DETECT AGENT NOS DNA QUANT: CPT | Performed by: NURSE PRACTITIONER

## 2021-01-25 PROCEDURE — 94640 AIRWAY INHALATION TREATMENT: CPT

## 2021-01-25 PROCEDURE — 97530 THERAPEUTIC ACTIVITIES: CPT | Mod: GO

## 2021-01-25 PROCEDURE — 97535 SELF CARE MNGMENT TRAINING: CPT | Mod: GO

## 2021-01-25 PROCEDURE — 200N000002 HC R&B ICU UMMC

## 2021-01-25 PROCEDURE — 87116 MYCOBACTERIA CULTURE: CPT | Performed by: STUDENT IN AN ORGANIZED HEALTH CARE EDUCATION/TRAINING PROGRAM

## 2021-01-25 PROCEDURE — 93306 TTE W/DOPPLER COMPLETE: CPT

## 2021-01-25 PROCEDURE — 87205 SMEAR GRAM STAIN: CPT | Performed by: STUDENT IN AN ORGANIZED HEALTH CARE EDUCATION/TRAINING PROGRAM

## 2021-01-25 PROCEDURE — 87102 FUNGUS ISOLATION CULTURE: CPT | Performed by: STUDENT IN AN ORGANIZED HEALTH CARE EDUCATION/TRAINING PROGRAM

## 2021-01-25 PROCEDURE — 84100 ASSAY OF PHOSPHORUS: CPT | Performed by: STUDENT IN AN ORGANIZED HEALTH CARE EDUCATION/TRAINING PROGRAM

## 2021-01-25 PROCEDURE — 94667 MNPJ CHEST WALL 1ST: CPT

## 2021-01-25 PROCEDURE — 97161 PT EVAL LOW COMPLEX 20 MIN: CPT | Mod: GP

## 2021-01-25 PROCEDURE — 0W9B3ZZ DRAINAGE OF LEFT PLEURAL CAVITY, PERCUTANEOUS APPROACH: ICD-10-PCS | Performed by: INTERNAL MEDICINE

## 2021-01-25 PROCEDURE — 84157 ASSAY OF PROTEIN OTHER: CPT | Performed by: STUDENT IN AN ORGANIZED HEALTH CARE EDUCATION/TRAINING PROGRAM

## 2021-01-25 PROCEDURE — 94003 VENT MGMT INPAT SUBQ DAY: CPT

## 2021-01-25 PROCEDURE — 88305 TISSUE EXAM BY PATHOLOGIST: CPT | Mod: 26 | Performed by: PATHOLOGY

## 2021-01-25 PROCEDURE — 250N000011 HC RX IP 250 OP 636: Performed by: NURSE PRACTITIONER

## 2021-01-25 PROCEDURE — 85027 COMPLETE CBC AUTOMATED: CPT | Performed by: STUDENT IN AN ORGANIZED HEALTH CARE EDUCATION/TRAINING PROGRAM

## 2021-01-25 PROCEDURE — 99291 CRITICAL CARE FIRST HOUR: CPT | Mod: 25 | Performed by: INTERNAL MEDICINE

## 2021-01-25 PROCEDURE — 89051 BODY FLUID CELL COUNT: CPT | Performed by: STUDENT IN AN ORGANIZED HEALTH CARE EDUCATION/TRAINING PROGRAM

## 2021-01-25 PROCEDURE — 250N000012 HC RX MED GY IP 250 OP 636 PS 637: Performed by: STUDENT IN AN ORGANIZED HEALTH CARE EDUCATION/TRAINING PROGRAM

## 2021-01-25 PROCEDURE — 999N001017 HC STATISTIC GLUCOSE BY METER IP

## 2021-01-25 PROCEDURE — 97530 THERAPEUTIC ACTIVITIES: CPT | Mod: GP

## 2021-01-25 PROCEDURE — 88112 CYTOPATH CELL ENHANCE TECH: CPT | Mod: TC | Performed by: STUDENT IN AN ORGANIZED HEALTH CARE EDUCATION/TRAINING PROGRAM

## 2021-01-25 PROCEDURE — 32555 ASPIRATE PLEURA W/ IMAGING: CPT | Performed by: INTERNAL MEDICINE

## 2021-01-25 RX ORDER — VANCOMYCIN HYDROCHLORIDE 1 G/200ML
1000 INJECTION, SOLUTION INTRAVENOUS ONCE
Status: COMPLETED | OUTPATIENT
Start: 2021-01-25 | End: 2021-01-25

## 2021-01-25 RX ORDER — ACETYLCYSTEINE 200 MG/ML
2 SOLUTION ORAL; RESPIRATORY (INHALATION) 4 TIMES DAILY
Status: DISCONTINUED | OUTPATIENT
Start: 2021-01-25 | End: 2021-01-31

## 2021-01-25 RX ADMIN — BUDESONIDE 0.5 MG: 0.5 INHALANT ORAL at 20:26

## 2021-01-25 RX ADMIN — HEPARIN SODIUM 5000 UNITS: 5000 INJECTION, SOLUTION INTRAVENOUS; SUBCUTANEOUS at 07:54

## 2021-01-25 RX ADMIN — POLYETHYLENE GLYCOL 3350 17 G: 17 POWDER, FOR SOLUTION ORAL at 07:54

## 2021-01-25 RX ADMIN — FAMOTIDINE 20 MG: 40 POWDER, FOR SUSPENSION ORAL at 07:54

## 2021-01-25 RX ADMIN — INSULIN ASPART 3 UNITS: 100 INJECTION, SOLUTION INTRAVENOUS; SUBCUTANEOUS at 17:50

## 2021-01-25 RX ADMIN — HYDROCORTISONE SODIUM SUCCINATE 50 MG: 100 INJECTION, POWDER, FOR SOLUTION INTRAMUSCULAR; INTRAVENOUS at 17:51

## 2021-01-25 RX ADMIN — IPRATROPIUM BROMIDE AND ALBUTEROL SULFATE 3 ML: .5; 3 SOLUTION RESPIRATORY (INHALATION) at 08:37

## 2021-01-25 RX ADMIN — PIPERACILLIN AND TAZOBACTAM 2.25 G: 2; .25 INJECTION, POWDER, FOR SOLUTION INTRAVENOUS at 04:15

## 2021-01-25 RX ADMIN — VANCOMYCIN HYDROCHLORIDE 1000 MG: 1 INJECTION, SOLUTION INTRAVENOUS at 17:51

## 2021-01-25 RX ADMIN — ACETYLCYSTEINE 2 ML: 200 SOLUTION ORAL; RESPIRATORY (INHALATION) at 08:36

## 2021-01-25 RX ADMIN — INSULIN ASPART 2 UNITS: 100 INJECTION, SOLUTION INTRAVENOUS; SUBCUTANEOUS at 04:17

## 2021-01-25 RX ADMIN — PIPERACILLIN AND TAZOBACTAM 2.25 G: 2; .25 INJECTION, POWDER, FOR SOLUTION INTRAVENOUS at 23:47

## 2021-01-25 RX ADMIN — PIPERACILLIN AND TAZOBACTAM 2.25 G: 2; .25 INJECTION, POWDER, FOR SOLUTION INTRAVENOUS at 17:51

## 2021-01-25 RX ADMIN — IPRATROPIUM BROMIDE AND ALBUTEROL SULFATE 3 ML: .5; 3 SOLUTION RESPIRATORY (INHALATION) at 15:34

## 2021-01-25 RX ADMIN — BUDESONIDE 0.5 MG: 0.5 INHALANT ORAL at 08:41

## 2021-01-25 RX ADMIN — METHYLPREDNISOLONE SODIUM SUCCINATE 125 MG: 125 INJECTION, POWDER, FOR SOLUTION INTRAMUSCULAR; INTRAVENOUS at 04:15

## 2021-01-25 RX ADMIN — INSULIN ASPART 5 UNITS: 100 INJECTION, SOLUTION INTRAVENOUS; SUBCUTANEOUS at 23:55

## 2021-01-25 RX ADMIN — IPRATROPIUM BROMIDE AND ALBUTEROL SULFATE 3 ML: .5; 3 SOLUTION RESPIRATORY (INHALATION) at 20:27

## 2021-01-25 RX ADMIN — INSULIN ASPART 5 UNITS: 100 INJECTION, SOLUTION INTRAVENOUS; SUBCUTANEOUS at 21:02

## 2021-01-25 RX ADMIN — Medication 2.4 UNITS/HR: at 02:06

## 2021-01-25 RX ADMIN — SULFAMETHOXAZOLE AND TRIMETHOPRIM 295 MG: 200; 40 SUSPENSION ORAL at 20:56

## 2021-01-25 RX ADMIN — Medication 0.1 MCG/KG/MIN: at 12:54

## 2021-01-25 RX ADMIN — PIPERACILLIN AND TAZOBACTAM 2.25 G: 2; .25 INJECTION, POWDER, FOR SOLUTION INTRAVENOUS at 12:51

## 2021-01-25 RX ADMIN — INSULIN ASPART 1 UNITS: 100 INJECTION, SOLUTION INTRAVENOUS; SUBCUTANEOUS at 08:30

## 2021-01-25 RX ADMIN — HEPARIN SODIUM 5000 UNITS: 5000 INJECTION, SOLUTION INTRAVENOUS; SUBCUTANEOUS at 17:50

## 2021-01-25 RX ADMIN — IPRATROPIUM BROMIDE AND ALBUTEROL SULFATE 3 ML: .5; 3 SOLUTION RESPIRATORY (INHALATION) at 12:24

## 2021-01-25 RX ADMIN — ACETYLCYSTEINE 2 ML: 200 SOLUTION ORAL; RESPIRATORY (INHALATION) at 20:27

## 2021-01-25 RX ADMIN — HYDROCORTISONE SODIUM SUCCINATE 50 MG: 100 INJECTION, POWDER, FOR SOLUTION INTRAMUSCULAR; INTRAVENOUS at 12:49

## 2021-01-25 RX ADMIN — SULFAMETHOXAZOLE AND TRIMETHOPRIM 295 MG: 200; 40 SUSPENSION ORAL at 07:54

## 2021-01-25 RX ADMIN — HYDROCORTISONE SODIUM SUCCINATE 50 MG: 100 INJECTION, POWDER, FOR SOLUTION INTRAMUSCULAR; INTRAVENOUS at 23:47

## 2021-01-25 RX ADMIN — TACROLIMUS 0.5 MG: 5 CAPSULE ORAL at 17:50

## 2021-01-25 RX ADMIN — HEPARIN SODIUM 5000 UNITS: 5000 INJECTION, SOLUTION INTRAVENOUS; SUBCUTANEOUS at 23:47

## 2021-01-25 RX ADMIN — TACROLIMUS 1 MG: 5 CAPSULE ORAL at 07:55

## 2021-01-25 RX ADMIN — INSULIN ASPART 2 UNITS: 100 INJECTION, SOLUTION INTRAVENOUS; SUBCUTANEOUS at 12:53

## 2021-01-25 RX ADMIN — Medication 125 MCG/HR: at 09:10

## 2021-01-25 RX ADMIN — SODIUM CHLORIDE 300 MG: 9 INJECTION, SOLUTION INTRAVENOUS at 07:54

## 2021-01-25 RX ADMIN — ACETYLCYSTEINE 2 ML: 200 SOLUTION ORAL; RESPIRATORY (INHALATION) at 15:34

## 2021-01-25 RX ADMIN — ACETYLCYSTEINE 2 ML: 200 SOLUTION ORAL; RESPIRATORY (INHALATION) at 12:24

## 2021-01-25 ASSESSMENT — ACTIVITIES OF DAILY LIVING (ADL)
ADLS_ACUITY_SCORE: 12

## 2021-01-25 NOTE — PROGRESS NOTES
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 She was transferred to OSH ICU 1/22 and intubated and requiring vasopressors. 1/24 transferred to [facility] Medical ICU service for ongoing management. Outside chest CT showed bilateral infiltrates.  Covid-19 negative X3 at OSH. Procalcitonin negative. Urine strep and RPP negative.  - Bronch completed, KOH and Gram not revealing,  with neutrophil predominance, other labs pending  - F/U other micro w/u BCx, UA with reflex, legionella, histo, CMV  - Bedside diagnostic thoracentesis done for chronic empyema, samples sent for micro etc.  - Continue antibiotic treatment with vanc, zosyn, and bactrim (steno history).   - Transplant team following.    Acute Issues   1. Acute Hypoxic Respiratory Failure likely sec to bilateral pneumonia. On minimal vent settings. Failed SBT today due to significant tachycardia. Will re-attempt tomorrow. In the meantime, cautious administration of fluids to keep patient just at even.  2. 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 Transplant team following. Continue PTA tacrolimus, levels checked and will make adjustments. Hold PTA prednisone while on stress steroids  3. Chronic/Stable right aspergillus empyema- Will perform bedside diagnostic thoracentesis and send for diagnostics.  4.  Septic shock on pressors - Wean off as tolerated. Going down on pressor needs. Will give fluids as patient appears to be on the dry side.  5. ESRD on HD - Nephrology for consideration of CRRT/HD    The patient was seen and examined with the resident/fellow physician.  We have discussed the patient in detail and I agree with the findings, assessment, and plan as documented when this note was cosigned on this day. The plan was formulated in conjunction with pharmacy, ICU nurses, and respiratory therapist. I have evaluated all laboratory values and imaging studies for the past 24 hours. I have reviewed all the consults that have been ordered and are active for this patient.      Critical Care Time: 30 min.  I spent this time (excluding procedures) personally providing and directing critical care services at the bedside and on the critical care unit.      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 She was transferred to OSH ICU 1/22 and intubated and requiring vasopressors. 1/24 transferred to [facility] Medical ICU service for ongoing management.     CHANGES and MAJOR THINGS TODAY:   - Continue to wean FiO2 as able  - Will perform SBT today  - Levophed and vaso ordered Map goal > 65, weaning as tolerated  - Will hold off on dialysis at this time  - Continue antibiotic treatment with vanc, zosyn, and bactrim (steno history)     PLAN:     Neuro:  # Pain and sedation  - versed and  Fentanyl for sedation  - RASS goal -1 to -2     Pulmonary:  # Acute Hypoxic Respiratory Failure  Suspected 2/2 pneumonia. OSH CT 1/23 + bilateral ground glass opacities and consolidative lung infiltrates centrally distributed to the upper lobes and RLL, . Covid-19 negative X3 at OSH.   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  - Continue PTA tacrolimus, AM level ordered  - Hold PTA prednisone while on stress steroids  - Pulmonary lung transplant team consulted, appreciate recs     Cardiovascular:  # Shock  # HX Hypertension  Patient was transferred on Levophed. Right heart catheterization 03/10/2020 concluded right sided filling pressures mildly elevated, mild PHTN, mild right sided filling pressures, and normal cardiac output. Last ECHO 10/21/20 with EF 60-65%, normal LV & RV function.   - Levophed and vaso ordered Map goal > 65, weaning as tolerated  - Stress dose steroids ordered  - Will repeat TTE  - Hold PTA coreg and amlodipine      GI/Nutrition:  #Portal HTN  Liver biopsy positive for congestive hepatopathy. Previous had ascites thought to be r/t elevated right sided heart pressures. Last saw GI on 12/21/20 and patient endorsed that her ascites is no longer present.   - NTD     #Nutrition  - RD consulted for Tube  Feeds  - Gastric tube in place      Renal/Fluids/Electrolytes:  # ESRD on iHD twice weekly (M/Th).   # Anion gap metabolic acidosis r/t ESRD, uremia (OSH ABG 7.31/37/120/19 & Anion Gap 25.7)   Last dialyzed 1/22 in [place]. Outpatient dry body weight 56.5 kg.   - Nephrology Consulted   - Will hold off on dialysis at this time  - I&O  - Daily Weights   - Trend BMP      Endocrine:  # Seronegative RA with Raynaud's   - NTD, monitor      High intensity sliding scale insulin ordered given stress dose steroids     ID:  # Sepsis   Suspected related to bacterial pneumonia. OSH CT 1/23 + bilateral ground glass opacities and consolidative lung infiltrates centrally distributed to the upper lobes and RLL. Covid-19 negative X3 at OSH. Procalcitonin negative. Urine strep and RPP negative.  - Bronch completed, KOH and Gram not revealing,  with neutrophil predominance, other labs pending  - F/U other micro w/u BCx, UA with reflex, legionella, histo, CMV  - Continue antibiotic treatment with vanc, zosyn, and bactrim (steno history)     # Chronic/Stable right aspergillus empyema   - Continue PTA posaconazole      Hematology:    # Anemia r/t renal disease   # Thrombocytopenia r/t critical illness, resolved   # Leukocytosis, improving  Takes aranesp 25 mcg every four weeks, last dose 01/14.    - Daily CBC     Musculoskeletal:  - PT/OT when appropriate      Skin:  # Dermatomyositis antitryptase syndrome   - Noted, NTD     General Cares/Prophylaxis:    DVT Prophylaxis: Heparin SQ  GI Prophylaxis: H2 Blocker  Restraints: Not indicated  Family Communication:  updated at bedside  Code Status: Full      Lines/tubes/drains:  - Basilio  - CVC  - PIVs     Disposition:  - Medical ICU    Patient seen and findings/plan discussed with medical ICU staff, Dr. Jean.    Tomas Rolle    ====================================  INTERVAL HISTORY:   NAEO. Patient reports that she feels overwhelmed but denies any acute symptoms this AM. She  feels comfortable on the breathing machine. She denies any new pains.    OBJECTIVE:   1. VITAL SIGNS:   Temp:  [98.2  F (36.8  C)-98.7  F (37.1  C)] 98.6  F (37  C)  Pulse:  [64-98] 66  Resp:  [24-29] 24  BP: ()/(52-72) 92/58  FiO2 (%):  [50 %] 50 %  SpO2:  [82 %-100 %] 100 %  Ventilation Mode: CMV/AC  (Continuous Mandatory Ventilation/ Assist Control)  FiO2 (%): 50 %  Rate Set (breaths/minute): 24 breaths/min  Tidal Volume Set (mL): 320 mL  PEEP (cm H2O): 5 cmH2O  Pressure Support (cm H2O): 23 cmH2O  Oxygen Concentration (%): 40 %  Resp: 24    2. INTAKE/ OUTPUT:   I/O last 3 completed shifts:  In: 1453.91 [I.V.:573.91; NG/GT:250; IV Piggyback:500]  Out: 105 [Urine:105]    3. PHYSICAL EXAMINATION:  General: Laying in bed. No acute distress.   HEENT: PERRLA. Following commands. Responding appropriately. No focal deficits.  Neuro: A&Ox3, NAD  Pulm/Resp: Breath sounds coarse/dim throughout  CV: RRR  Abdomen: Soft, non-distended, non-tender  :  rivas catheter in place, urine yellow and clear  Incisions/Skin: Left AV fistula with thrill and bruit    4. LABS:   Arterial Blood Gases   Recent Labs   Lab 01/24/21  1529   PH 7.36   PCO2 29*   PO2 177*   HCO3 16*     Complete Blood Count   Recent Labs   Lab 01/25/21  0406 01/24/21  1458   WBC 12.5* 20.5*   HGB 10.9* 11.1*    192     Basic Metabolic Panel  Recent Labs   Lab 01/25/21  0406 01/24/21  1653 01/24/21  1458     --  134   POTASSIUM 4.1 3.8 3.7   CHLORIDE 101  --  98   CO2 16*  --  16*   BUN 58*  --  48*   CR 3.73*  --  3.39*   *  --  155*     Liver Function Tests  Recent Labs   Lab 01/24/21  1458   AST 31   ALT 35   ALKPHOS 244*   BILITOTAL 1.2   ALBUMIN 2.0*     Coagulation Profile  No lab results found in last 7 days.    5. RADIOLOGY:   Recent Results (from the past 24 hour(s))   XR Chest Port 1 View    Narrative    EXAM: XR CHEST PORT 1 VW  1/24/2021 3:08 PM     HISTORY:  s/p b/l lung transplant w/new HRF, also confirm  line/tube  placement       COMPARISON:  Chest x-ray 12/9/2020    FINDINGS:   Portable supine view of the chest. Post surgical changes of bilateral  lung transplant with mediastinal surgical clips and intact medial  sternotomy wires. Endotracheal tube tip projects over the low thoracic  trachea. Right internal jugular central venous catheter tip projects  at the superior cavoatrial junction. Enteric tube tip and sidehole  projected within the stomach.    The trachea is midline. Cardiac silhouette is within normal limits.  There patchy airspace opacities bilaterally. Small right pleural  effusion with overlying basilar opacities. No pneumothorax.      Impression    IMPRESSION:   1. Endotracheal tube tip projects over the low thoracic trachea.  2. Right internal jugular central venous catheter tip projects over  the superior cavoatrial junction.  2. Enteric tube tip and sidehole projected within the stomach.  4. Patchy airspace opacities bilaterally, suggestive of edema versus  infection.  5. Small right pleural effusion.    I have personally reviewed the examination and initial interpretation  and I agree with the findings.    MEHNAZ CHAPMAN MD   XR Chest Port 1 View    Narrative    Portable chest    INDICATION: Acute hypoxemic respiratory failure    COMPARISON: 1/24/2021    FINDINGS: Heart size remains normal. Right IJ catheter tip appears in  the region of the SVC/right atrial junction. There is some coiling at  the superior aspect of the tubing, this could be outside of the  patient, please correlate clinically to ensure that this is not coiled  within a vascular structure. No pneumothorax. Median sternotomy again  present. Opacities in the lungs appears similar along with blunting of  right costophrenic angle.  Endotracheal tube tip there is approximately 2.3 cm above the gee.  Surgical clips in the right axillary region are present. NG/OG tube  tip and sidehole projected within the stomach.       Impression    IMPRESSION: Continued opacification in the lungs which may represent  infection or edema. No significant change. Unchanged small right  pleural effusion.    TERRI ISSA MD

## 2021-01-25 NOTE — PROGRESS NOTES
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 Nephrology consutled for dialysis needs.    Interval History :   Mrs Blue last had HD on 1/22, up a bit in wt but likely this is a difference in scales as her exam shows no signs of overload.  With improving pressor needs and no major issue with chemistries today we can hold on a run, will plan for HD tomorrow and try to avoid CRRT as this would involve getting cathter access.      Assessment & Recommendations:   ESRD-Runs at [place] through [facility].  Has atypical HD schedule of Monday and Thursday, has AVF with partial graft.  Acutely CRRT has been considered but pressor needs are improving and there are no chemistry issues we need to correct today.  Will likely try iHD tomorrow even if on stable pressor, will try to avoid catheter if able.     -Holding on run today, plan for HD tomorrow as long as pressures are stable   -Access is L arm AVF/graft    Volume status-Minimal edema on exam, wt is up but this is likely variation in scale.      Electrolytes/pH-K 4.1, bicarb 16, holding on run today, likely HD tomorrow.     Ca/phos/pth-Ca 8.1, Mg and Phos mildly up.     Anemia-Hgb 10.9, acute management per team.      Nutrition-Nutren Tf.     Seen and discussed with Dr Solis    Recommendations were communicated to primary team via verbal communication.     ADRIÁN Lo CNS  Clinical Nurse Specialist  [phone]    Review of Systems:   I reviewed the following systems:  Gen: No fevers or chills  CV: No CP at rest  Resp: No SOB at rest  GI: No N/V    Physical Exam:   I/O last 3  completed shifts:  In: 1453.91 [I.V.:573.91; NG/GT:250; IV Piggyback:500]  Out: 105 [Urine:105]   BP (!) 84/53   Pulse 80   Temp 98.3  F (36.8  C) (Axillary)   Resp 24   Wt 60.4 kg (133 lb 2.5 oz)   LMP 06/07/2014 (Exact Date)   SpO2 98%   BMI 23.59 kg/m       GENERAL APPEARANCE: no distress, intubated but communicating non-verbally.   EYES: no scleral icterus  Endo: no moon facies  Pulmonary: Decreased BS  CV: regular rhythm, normal rate - Edema present  GI: soft, non distended  MS: no evidence of inflammation in joints, no muscle tenderness  :  Basilio present  SKIN: warm, dry,  NEURO: face symmetric     Labs:   All labs reviewed by me  Electrolytes/Renal -   Recent Labs   Lab Test 01/25/21  0406 01/24/21  1653 01/24/21  1458 12/23/20  0838     --  134 138   POTASSIUM 4.1 3.8 3.7 4.2   CHLORIDE 101  --  98 103   CO2 16*  --  16* 26   BUN 58*  --  48* 48*   CR 3.73*  --  3.39* 3.56*   *  --  155* 130*   CHELSEY 8.1*  --  8.2* 8.1*   MAG 2.5* 2.5* 2.4* 1.6   PHOS 6.8* 6.5* 6.5*  --        CBC -   Recent Labs   Lab Test 01/25/21  0406 01/24/21  1458 12/23/20  0838   WBC 12.5* 20.5* 5.5   HGB 10.9* 11.1* 12.4    192 149*       LFTs -   Recent Labs   Lab Test 01/24/21  1458 12/09/20  0721 10/19/20 09/09/20  0822   ALKPHOS 244* 333* 614 652*   BILITOTAL 1.2 0.8 0.9 0.5   ALT 35 55* 35 31   AST 31 28 25 22   PROTTOTAL 6.1* 6.6*  --  8.3   ALBUMIN 2.0* 2.9* 3.0 3.2*       Iron Panel -   Recent Labs   Lab Test 12/13/18  1033 08/01/18  0921 05/08/18  0709 05/08/18  0709   IRON 16* 93  --  48   IRONSAT 7* 37  --  18   YOLA 302* 571*   < >  --     < > = values in this interval not displayed.           Current Medications:    acetylcysteine  2 mL Nebulization 4x Daily     budesonide  0.5 mg Nebulization BID     famotidine  20 mg Oral or Feeding Tube Daily     heparin ANTICOAGULANT  5,000 Units Subcutaneous Q8H     hydrocortisone sodium succinate PF  50 mg Intravenous Q6H     insulin aspart  1-12 Units  Subcutaneous Q4H     ipratropium - albuterol 0.5 mg/2.5 mg/3 mL  3 mL Nebulization 4x daily     piperacillin-tazobactam  2.25 g Intravenous Q6H     polyethylene glycol  17 g Oral or Feeding Tube Daily     posaconazole (NOXAFIL) intermittent infusion  300 mg Intravenous Daily     [Held by provider] predniSONE  2.5 mg Oral or Feeding Tube QPM     [Held by provider] predniSONE  5 mg Oral or Feeding Tube QAM     sulfamethoxazole-trimethoprim  295 mg Oral or Feeding Tube BID     tacrolimus  0.5 mg Oral or Feeding Tube QPM     tacrolimus  1 mg Oral or Feeding Tube QAM     vancomycin place jordan - receiving intermittent dosing  1 each Does not apply See Admin Instructions       dextrose       fentaNYL 125 mcg/hr (01/25/21 1300)     midazolam 1 mg/hr (01/25/21 1300)     norepinephrine 0.1 mcg/kg/min (01/25/21 1300)     propofol (DIPRIVAN) infusion Stopped (01/24/21 1830)     vasopressin Stopped (01/25/21 1300)

## 2021-01-25 NOTE — CONSULTS
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 Nephrology consutled for dialysis needs    ESRD On HD  Hypoxic respiratory failure   Septic shock likely 2/2 pneumonia in lung tx  Dialyzes on Monday and Thursday. Last dialysis was on Friday as she was not feeling on Thursday. Per  HD sessions were going well. Dialyzes via AVF. EDW is about 52kgs,. Admitted with septic shock requiring pressors. No acute indication for dialysis based on labs, with pressor requirement will not able to do UF.   - discussed in detail about need for RRT  - primary team will notify if acute need arises  - If she were to require dialysis, will offer CRRT in setting high pressor requirement and need HD line.  - Consent for dialysis is obtained from .    Recommendations were communicated to primary team via this note and via telephone    Seen and discussed with Dr. 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     Pt is intubated and sedated, unable to obtain history from pt. Most of the history is from chart review    PAST MEDICAL HISTORY:    Past Medical History:   Diagnosis Date     Abdominal pain 10/6/2019     Acute on chronic respiratory failure with hypoxia (H) 2/21/2018     Antisynthetase syndrome (H) 2014     Chronic cough      Chronic infection     recent C diff  8/18     Dehydration 8/1/2018     Dehydration 8/1/2018     Dermatomyositis (H)      Dysplasia of cervix, low grade (ESTRADA 1)      ILD (interstitial lung disease) (H)      Nausea and vomiting 12/4/2018     Osteopenia      PONV (postoperative nausea and vomiting)      Pulmonary hypertension (H)      Raynaud's disease      Seronegative rheumatoid arthritis (H)        Past Surgical History:   Procedure Laterality Date     BRONCHOSCOPY (RIGID OR FLEXIBLE), DIAGNOSTIC N/A 4/10/2018    Procedure: COMBINED BRONCHOSCOPY (RIGID OR FLEXIBLE), LAVAGE;;  Surgeon: Mariposa Donohue MD;  Location:  GI     BRONCHOSCOPY (RIGID OR FLEXIBLE), DIAGNOSTIC N/A 12/23/2020    Procedure: BRONCHOSCOPY, WITH BRONCHOALVEOLAR LAVAGE;  Surgeon: Uri Bass MD;  Location:  GI     BRONCHOSCOPY (RIGID OR FLEXIBLE), DILATE BRONCHUS / TRACHEA N/A 10/11/2018    Procedure: BRONCHOSCOPY (RIGID OR FLEXIBLE), DILATE BRONCHUS / TRACHEA;  Flexible And Rigid Bronchoscopy and Dilation;  Surgeon: Wilber Lin MD;  [facility]     BRONCHOSCOPY FLEXIBLE N/A 3/13/2018    Procedure: BRONCHOSCOPY FLEXIBLE;  Flexible Bronchoscopy ;  Surgeon: Gissell Sanchez MD;  Location:  GI     BRONCHOSCOPY FLEXIBLE N/A 5/9/2018    Procedure: BRONCHOSCOPY FLEXIBLE;;  Surgeon: Wilber Lin MD;  Location: [facility] GI     BRONCHOSCOPY FLEXIBLE AND RIGID N/A 9/10/2018    Procedure: BRONCHOSCOPY FLEXIBLE AND RIGID;  Flexible and Rigid Bronchoscopy with Balloon Dilation, tissue debulking with cryo, and Right mainstem bronchus stent placement;   Surgeon: Wilber Lin MD;  [facility]     BRONCHOSCOPY RIGID N/A 6/6/2018    Procedure: BRONCHOSCOPY RIGID;;  Surgeon: Lopez Macias MD;  [facility]     BRONCHOSCOPY, DILATE BRONCHUS, STENT BRONCHUS, COMBINED N/A 6/11/2018    Procedure: COMBINED BRONCHOSCOPY, DILATE BRONCHUS, STENT BRONCHUS;  Flexible Bronchoscopy, Balloon Dilation, Bronchial Washings;  Surgeon: Wilber Lin MD;  [facility]     BRONCHOSCOPY, DILATE BRONCHUS, STENT BRONCHUS, COMBINED Right 7/10/2018    Procedure: COMBINED BRONCHOSCOPY, DILATE BRONCHUS, STENT BRONCHUS;  Flexible Bronchoscopy, Balloon Dilation, Bronchial Washings  ;  Surgeon: Wilber Lin MD;  [facility]     BRONCHOSCOPY, DILATE BRONCHUS, STENT BRONCHUS, COMBINED N/A 8/2/2018    Procedure: COMBINED BRONCHOSCOPY, DILATE BRONCHUS, STENT BRONCHUS;  Flexible Bronchoscopy, Bronchial Washings, Balloon Dilation;  Surgeon: Wilber Lin MD;  [facility]     BRONCHOSCOPY, DILATE BRONCHUS, STENT BRONCHUS, COMBINED N/A 8/20/2018    Procedure: COMBINED BRONCHOSCOPY, DILATE BRONCHUS, STENT BRONCHUS;  Flexible Bronchoscopy, Balloon Dilation;  Surgeon: Wilber Lin MD;  [facility]     BRONCHOSCOPY, DILATE BRONCHUS, STENT BRONCHUS, COMBINED N/A 10/29/2018    Procedure: Flexible Bronchoscopy, Balloon Dilation, Stent Revision, Airway Examination And Therapeutic Suctioning, Cyro Tumor Debulking;  Surgeon: Wilber Lin MD;  [facility]     BRONCHOSCOPY, DILATE BRONCHUS, STENT BRONCHUS, COMBINED N/A 11/20/2018    Procedure: Rigid Bronchoscopy, Stent Removal and dilitation;  Surgeon: Wilber Lin MD;  [facility]     BRONCHOSCOPY, DILATE BRONCHUS, STENT BRONCHUS, COMBINED N/A 12/14/2018    Procedure: Flexible And Rigid Bronchoscopy, Balloon Dilation, Bronchial Washing;  Surgeon: Wilber Lin MD;  [facility]     BRONCHOSCOPY, DILATE BRONCHUS, STENT BRONCHUS, COMBINED N/A  1/17/2019    Procedure: Flexible And Rigid Bronchoscopy, Balloon Dilation.;  Surgeon: Wilber Lin MD;  [facility]     BRONCHOSCOPY, DILATE BRONCHUS, STENT BRONCHUS, COMBINED N/A 3/7/2019    Procedure: Flexible and Rigid Bronchoscopy, Bronchial Washing, Balloon Dilation;  Surgeon: Wilber Lin MD;  [facility]     BRONCHOSCOPY, DILATE BRONCHUS, STENT BRONCHUS, COMBINED N/A 6/6/2019    Procedure: Rigid and Flexible Bronchoscopy, Balloon Dilation;  Surgeon: Wilber Lin MD;  [facility]     BRONCHOSCOPY, DILATE BRONCHUS, STENT BRONCHUS, COMBINED N/A 10/11/2019    Procedure: Flexible and Rigid Bronchoscopy, Balloon Dilation, Bronchoalveolar Lagave;  Surgeon: Wilber Lin MD;  [facility]     CV RIGHT HEART CATH MEASUREMENTS RECORDED N/A 3/10/2020    Procedure: CV RIGHT HEART CATH;  Surgeon: Wai Garcia MD;  Location:  HEART CARDIAC CATH LAB     ENT SURGERY      tonsillectomy as a child     ESOPHAGOSCOPY, GASTROSCOPY, DUODENOSCOPY (EGD), COMBINED N/A 10/29/2018    Procedure: COMBINED ESOPHAGOSCOPY, GASTROSCOPY, DUODENOSCOPY (EGD) with biopsies and polypectomy;  Surgeon: Chente Bloom MD;  [facility]     INSERT EXTRACORPORAL MEMBRANE OXYGENATOR ADULT (OUTSIDE OR) N/A 2/27/2018    Procedure: INSERT EXTRACORPORAL MEMBRANE OXYGENATOR ADULT (OUTSIDE OR);  INSERT EXTRACORPORAL MEMBRANE OXYGENATOR ADULT (OUTSIDE OR) ;  Surgeon: Toby Hernandez MD;  [facility]     IR CVC TUNNEL PLACEMENT > 5 YRS OF AGE  10/25/2019     IR PARACENTESIS  1/8/2020     IR THORACENTESIS  9/13/2019     IR TRANSCATHETER BIOPSY  1/8/2020     no prior surgery       REMOVE EXTRACORPORAL MEMBRANE OXYGENATOR ADULT N/A 3/3/2018    Procedure: REMOVE EXTRACORPORAL MEMBRANE OXYGENATOR ADULT;  Removal of Right Femoral Venous and Right Axillary Arterial Extracorporeal Membrane Oxygenator;  Surgeon: Toby Hernandez MD;  [facility]     TRANSPLANT LUNG  RECIPIENT SINGLE X2 Bilateral 3/1/2018    Procedure: TRANSPLANT LUNG RECIPIENT SINGLE X2;  Median Sternotomy, Extracorporeal Membrane Oxygenator, bilateral sequential lung transplant;  Surgeon: Toby Hernandez MD;  [facility]        MEDICATIONS:  PTA Meds  Prior to Admission medications    Medication Sig Last Dose Taking? Auth Provider   albuterol (PROAIR HFA/PROVENTIL HFA/VENTOLIN HFA) 108 (90 Base) MCG/ACT inhaler Inhale 2 puffs into the lungs every 4 hours as needed for shortness of breath / dyspnea or wheezing   Ame Chow PA-C   amLODIPine (NORVASC) 5 MG tablet Take 2 tablets (10 mg) by mouth daily   Srinivas Mcelroy MD   B Complex-C-Folic Acid (DIALYVITE) TABS Take 1 tablet by mouth daily   Reported, Patient   calcium-vitamin D (CALTRATE) 600-400 MG-UNIT per tablet Take 1 tablet by mouth daily   Ame Chow PA-C   carvedilol (COREG) 25 MG tablet Take 1 tablet (25 mg) by mouth 2 times daily (with meals)   Srinivas Mcelroy MD   cefTAZidime (FORTAZ) 1 GM vial Inject 1 g into the vein every 24 hours On Dialysis days Monday and Thursday give Antibiotic AFTER dialysis   Vinicio Layton MD   ferrous sulfate (FEROSUL) 325 (65 Fe) MG tablet Take 1 tablet (325 mg) by mouth daily (with breakfast)   Christelle Lopez MD   fluticasone (FLONASE) 50 MCG/ACT nasal spray Spray 1 spray into both nostrils daily   Ame Chow PA-C   furosemide (LASIX) 40 MG tablet Take 40 mg by mouth daily   Reported, Patient   lidocaine-prilocaine (EMLA) 2.5-2.5 % external cream Apply 2.5 g topically as needed   Reported, Patient   magnesium oxide (MAG-OX) 400 MG tablet Take 400 mg by mouth daily    Unknown, Entered By History   multivitamin, therapeutic with minerals (THERA-VIT-M) TABS tablet Take 1 tablet by mouth daily   Alex Hale MD   pantoprazole (PROTONIX) 40 MG EC tablet Take 1 tablet (40 mg) by mouth daily   Srinivas Mcelroy MD   posaconazole (NOXAFIL) 100  MG EC tablet TAKE THREE TABLETS BY MOUTH TWICE ON THE FIRST DAY OF TREATMENT, THEN TAKE THREE TABLETS DAILY THEREAFTER   Srinivas Mcelroy MD   predniSONE 2.5 MG PO tablet Take two tablets (5mg) every AM and one tablet (2.5mg) every evening   Srinivas Mcelroy MD   SLOW-MAG 71.5-119 MG TBEC Take 3 tablets by mouth daily   Reported, Patient   tacrolimus (GENERIC EQUIVALENT) 0.5 MG capsule Take 1 capsule (0.5 mg) by mouth every evening Total dose: 1 mg in the AM and 0.5 mg in the PM   Dean Riley MD   tacrolimus (GENERIC EQUIVALENT) 1 MG capsule Take 1 capsule (1 mg) by mouth every morning Total dose: 1 mg in the AM and 0.5 mg in the PM   Dean Riley MD      Current Meds    budesonide  0.5 mg Nebulization BID     famotidine  20 mg Oral or Feeding Tube Daily     heparin ANTICOAGULANT  5,000 Units Subcutaneous Q8H     insulin aspart  1-12 Units Subcutaneous Q4H     ipratropium - albuterol 0.5 mg/2.5 mg/3 mL  3 mL Nebulization 4x daily     lactated ringers  500 mL Intravenous Once     methylPREDNISolone  125 mg Intravenous Q6H     piperacillin-tazobactam  2.25 g Intravenous Q6H     polyethylene glycol  17 g Oral or Feeding Tube Daily     posaconazole (NOXAFIL) intermittent infusion  300 mg Intravenous Daily     [Held by provider] predniSONE  2.5 mg Oral or Feeding Tube QPM     [Held by provider] predniSONE  5 mg Oral or Feeding Tube QAM     sulfamethoxazole-trimethoprim  295 mg Oral or Feeding Tube BID     tacrolimus  0.5 mg Oral or Feeding Tube QPM     [START ON 1/25/2021] tacrolimus  1 mg Oral or Feeding Tube QAM     vancomycin place jordan - receiving intermittent dosing  1 each Does not apply See Admin Instructions     Infusion Meds    dextrose       fentaNYL 150 mcg/hr (01/24/21 1715)     midazolam 2 mg/hr (01/24/21 1733)     norepinephrine 0.35 mcg/kg/min (01/24/21 1800)     propofol (DIPRIVAN) infusion 15 mcg/kg/min (01/24/21 1739)     vasopressin 2.4 Units/hr (01/24/21 1739)       ALLERGIES:     No Known Allergies    REVIEW OF SYSTEMS:  Pt is intubated and sedated, unable to obtain history from pt.    SOCIAL HISTORY:   Social History     Socioeconomic History     Marital status:      Spouse name: Not on file     Number of children: Not on file     Years of education: Not on file     Highest education level: Not on file   Occupational History     Not on file   Social Needs     Financial resource strain: Not on file     Food insecurity     Worry: Not on file     Inability: Not on file     Transportation needs     Medical: Not on file     Non-medical: Not on file   Tobacco Use     Smoking status: Never Smoker     Smokeless tobacco: Never Used   Substance and Sexual Activity     Alcohol use: No     Alcohol/week: 1.0 standard drinks     Types: 1 Glasses of wine per week     Drug use: No     Sexual activity: Not on file   Lifestyle     Physical activity     Days per week: Not on file     Minutes per session: Not on file     Stress: Not on file   Relationships     Social connections     Talks on phone: Not on file     Gets together: Not on file     Attends Congregational service: Not on file     Active member of club or organization: Not on file     Attends meetings of clubs or organizations: Not on file     Relationship status: Not on file     Intimate partner violence     Fear of current or ex partner: Not on file     Emotionally abused: Not on file     Physically abused: Not on file     Forced sexual activity: Not on file   Other Topics Concern     Parent/sibling w/ CABG, MI or angioplasty before 65F 55M? No   Social History Narrative    3/6/2019 - Lives with . Has three daughters. Four grandchildren (two ). No pets. Travelled previously to [place], [place]. Has visited [place] several times.      FAMILY MEDICAL HISTORY:   Family History   Problem Relation Age of Onset     Hypertension Mother      Arthritis Mother      Pancreatic Cancer Father      Diabetes Father        PHYSICAL EXAM:   Temp   Av.2  F (36.8  C)  Min: 98.2  F (36.8  C)  Max: 98.2  F (36.8  C)      Pulse  Av.6  Min: 75  Max: 98 Resp  Av.7  Min: 24  Max: 29  FiO2 (%)  Av %  Min: 50 %  Max: 50 %  SpO2  Av.8 %  Min: 97 %  Max: 100 %       BP 99/59   Pulse 78   Temp 98.2  F (36.8  C) (Axillary)   Resp 24   Wt 58.6 kg (129 lb 3 oz)   LMP 2014 (Exact Date)   SpO2 100%   BMI 22.89 kg/m     Date 21 0700 - 21 0659   Shift 1152-9209 1318-3609 6136-5225 24 Hour Total   INTAKE   NG/GT  90  90   Shift Total(mL/kg)  90(1.54)  90(1.54)   OUTPUT   Urine  5  5   Shift Total(mL/kg)  5(0.09)  5(0.09)   Weight (kg) 58.6 58.6 58.6 58.6      Admit Weight: 58.6 kg (129 lb 3 oz)     GENERAL APPEARANCE: no distress, intubated  EYES: no scleral icterus  Endo: no moon facies  Pulmonary: Decreased BS  CV: regular rhythm, normal rate - Edema present  GI: soft, non distended  MS: no evidence of inflammation in joints, no muscle tenderness  :  Basilio present  SKIN: warm, dry,  NEURO: face symmetric     LABS:   CMP  Recent Labs   Lab 21  1653 21  1458   NA  --  134   POTASSIUM 3.8 3.7   CHLORIDE  --  98   CO2  --  16*   ANIONGAP  --  20*   GLC  --  155*   BUN  --  48*   CR  --  3.39*   GFRESTIMATED  --  14*   GFRESTBLACK  --  16*   CHELSEY  --  8.2*   MAG 2.5* 2.4*   PHOS 6.5* 6.5*   PROTTOTAL  --  6.1*   ALBUMIN  --  2.0*   BILITOTAL  --  1.2   ALKPHOS  --  244*   AST  --  31   ALT  --  35     CBC  Recent Labs   Lab 21  1458   HGB 11.1*   WBC 20.5*   RBC 3.63*   HCT 34.6*   MCV 95   MCH 30.6   MCHC 32.1   RDW 14.7        INRNo lab results found in last 7 days.  ABG  Recent Labs   Lab 21  1529   PH 7.36   PCO2 29*   PO2 177*   HCO3 16*   O2PER 100.0      URINE STUDIES  Recent Labs   Lab Test 21  1729 10/21/19  2240 19  0125 19  1512   COLOR Yellow Yellow Light Yellow Yellow   APPEARANCE Slightly Cloudy Cloudy Clear Clear   URINEGLC Negative Negative Negative Negative    URINEBILI Negative Negative Negative Negative   URINEKETONE 5* Negative 10* Negative   SG 1.023 1.018 1.008 1.016   UBLD Small* Trace* Negative Negative   URINEPH 5.0 5.0 7.5* 7.0   PROTEIN 30* 30* 30* 100*   NITRITE Negative Negative Negative Negative   LEUKEST Moderate* Large* Negative Trace*   RBCU 26* 25* <1 10*   WBCU 34* 115* 3 19*     Recent Labs   Lab Test 08/07/19  1512   [facility] 2.47*     IRON STUDIES  Recent Labs   Lab Test 12/13/18  1033 08/01/18  0921 05/08/18  0709   IRON 16* 93 48   * 248 275   IRONSAT 7* 37 18   YOLA 302* 571*  --      Aisha Snow MD

## 2021-01-25 NOTE — PHARMACY
Kecia met enrollment criteria for my study, Creating a 21st Century [facility] [facility] ICU (IRB # [id number]) today. After approval to approach the LAR by the clinical care team and discussion with her beside RN, I was invited to telephone spouse, Ranjan and describe the research. I emailed him the consent and HIPAA consent forms for his review and later went over the study details and answered all his questions.  Ranjan emailed me approval to enroll his wife in the study.  I will be in tomorrow for the blood sample needed for our research.  Any questions, please contact, Siobhan Matos PharmD, [facility], PI at [email or web address].

## 2021-01-25 NOTE — PLAN OF CARE
ICU End of Shift Summary. See flowsheets for vital signs and detailed assessment.    Changes this shift: Lightly sedated, following commands. Norepinephrine and vasopressin to maintain MAP >65. Bronchoscopy completed. Tolerating ventilator on PEEP of 5 and 50%.  updated at bedside.      Plan: Continue to monitor and follow POC.

## 2021-01-25 NOTE — PLAN OF CARE
Major Shift Events:      Neuro: RASS -1 on 125mcg/hr fentanyl and 1mg/hr versed. Follows commands, moves all extremities. PERRLA.     Card: Weaned down pressors per MAR. Norepi currently at 0.12mcg/kg/min and vaso at 2.4units/hr.     Resp: CMV 50%/ 24/320/5PEEP    GI/: Oliguric with about 10mL/hr UO, usual dialysis on M/Th. Started TF per OG, currently at 25mL/hr with a goal of 45mL/hr. No BM this shift.       Plan: HD vs CRRT today, will need access placed for CRRT.   For vital signs and complete assessments, please see documentation flowsheets.

## 2021-01-25 NOTE — PHARMACY-VANCOMYCIN DOSING SERVICE
Pharmacy Vancomycin Note  Date of Service 2021  Patient's  1960   60 year old, female    Indication: Sepsis  Goal Trough Level: 15-20 mg/L  Day of Therapy: 2  Current Vancomycin regimen:  Intermittent dosing    ESRD on HD     Recent Vancomycin Levels (past 3 days)  2021: 12:59 PM Vancomycin Level 15.1 mg/L    Vancomycin IV Administrations (past 72 hours)      No vancomycin orders with administrations in past 72 hours.                Nephrotoxins and other renal medications (From now, onward)    Start     Dose/Rate Route Frequency Ordered Stop    21 1600  vancomycin (VANCOCIN) 1000 mg in dextrose 5% 200 mL PREMIX      1,000 mg  200 mL/hr over 1 Hours Intravenous ONCE 21 1502      21 0800  tacrolimus (GENERIC EQUIVALENT) suspension 1 mg      1 mg Oral or Feeding Tube EVERY MORNING. 21 1536      21 1800  tacrolimus (GENERIC EQUIVALENT) suspension 0.5 mg      0.5 mg Oral or Feeding Tube EVERY EVENING. 21 1443      21 1630  piperacillin-tazobactam (ZOSYN) 2.25 g vial to attach to  ml bag      2.25 g  over 30 Minutes Intravenous EVERY 6 HOURS 21 1443      21 1630  vasopressin 40 units in NS 40 mL (PITRESSIN) infusion      2.4 Units/hr  2.4 mL/hr  Intravenous CONTINUOUS 21 1611      21 1528  vancomycin place jordan - receiving intermittent dosing      1 each Does not apply SEE ADMIN INSTRUCTIONS 21 1528      21 1500  norepinephrine (LEVOPHED) 16 mg in  mL infusion CENTRAL LINE      0.03-0.4 mcg/kg/min × 58.6 kg (Dosing Weight)  1.6-22 mL/hr  Intravenous CONTINUOUS 21 1443               Contrast Orders - past 72 hours (72h ago, onward)    None          Interpretation of levels and current regimen:  Trough level is Therapeutic and re-dosing is appropriate    Renal Function: ESRD on HD.  Evaluation of HD vs CRRT plan today.  Tentative plan is to attempt HD tomorrow    Plan:  1.  Continue with intermittent  dosing.  Give 1000 mg IV x 1.  2.  Pharmacy will check trough level with am labs tomorrow to determine dose needed post-HD    Kortney Sanon, JoseD, BCPS

## 2021-01-25 NOTE — PROGRESS NOTES
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 Intubated  and transferred to [facility] for ongoing management.     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 Satting in the mid-low 80s on arrival. Initially on 4L NC and then rapidly required 15L non-rebreather. Intubated , c/b hypotension requiring initiation of pressors, and Pt transferred to [facility]. No fever, but with leukocytosis (WBC 20.5, ). COVID and Resp panel negative. Last bronch 20 showing right mainstem stenosis with moderate airway obstruction and RML/RLL mucous plugging, cultures showing Stenotrophomonas and Eikenella and patient was on IV ceftazidime  with plan for 2 week course (last on ). OSH CT chest (imaging personally reviewed with Dr. Roberson) showing new multifocal GGO bilaterally and increased left loculated pleural effusion. Repeat bronch with RUL BAL () with mild right mainstem stenosis and thick copious secretions RML and RLL. Etiology likely severe post obstructive pneumonia, possible worsened aspergillus empyema. Remains on ventilator with settings 24/320/5/50, and on 2 pressors. Hypoxia is improving, currenlty alert and sitting up in chair.    - Blood cultures and fungal  blood cultures (1/24) NGTD  - BAL cultures (1/24) and PJP PCR pending  - Ventilator management per MICU  - Pulmonary toilet with chest physiotherapy QID (ordered), and nebs: mucomyst and Duoneb QID  - Recommend consulting IP for possible right mainstem intervention  - ABX: Vancomycin, Zosyn, and Bactrim (PJP dosing) based on recent cultures  - Possibility of ABPA needs to be considered. IgE for screening is pending.  - If hypoxia is out of proportion to extent of infection, could pursue evaluation for hepatopulmonary syndrome (h/o portal HTN)  - Stress dose steroids (1/24-) per MICU  - Recommend not advancing tube feed rate past trickle rate d/t high risk of aspiration in lung transplant patient. If patient does not get extubated tomorrow, would recommend post pyloric feeding tube placement.  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 CT scan repeated on 7/17/20 showed increased mass-like density, likely pleural-based, in left lower lung area s/p needle aspiration on 8/13/20, now + for aspergillus fumigatus s/p intrapleural bead placement (amphoterecin) on 11/20. Following with ID as OP. LFTs stable on admission (ALP chronically mildly elevated). CT chest (as above) showing increased loculated left pleural effusion.  - PTA posaconazole indefinitely. Level and EKG 1/26 (ordered)  - Recommend diagnostic thoracentesis and send for cultures/diagnostics    S/p bilateral lung transplant for ILD 2/2 CTD: Last seen in pulmonary clinic 12/2. Recent CMV (12/9) and DSA (12/23) not detected.  - CMV, DSA ordered     Immunosuppression: on 2 drug IST d/t recurrent infections  - Tacrolimus 1mg in am and 0.5mg am.  Goal level 8-10.  Tacro level 1/25 therapeutic, next level 1/28 (ordered)  - Holding chronic prednisone while on stress dose steroids (PTA 5mg qAM and 2.5mg qPM)     Prophylaxis: (CMV D+/R+, EBV D+/R+)   - Bactrim treatment dose as above. PTA was not on PJP ppx since 7/28/20 as  CD4>200.      Low level EBV viremia: Last level 12/9/20 mildly elevated to 10K (log 4) from prior 3K (3.5 log) on 9/9/20.  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 Cr on admission 3.39,K+ 3.7.  - Close monitoring of tacrolimus as above  - Dialysis management per nephrology     We appreciate the excellent care provided by the MICU team. Recommendations communicated via in person rounding and this note. Will continue to follow along closely, please do not hesitate to call with any questions or concerns.    Patient discussed with Dr. Da Ibarra, APRN, CNP   Inpatient Nurse Practitioner  Pulmonary CF/Transplant  Pager [phone]      Subjective & Interval History:     Remains intubated on ventilator settings CMV/AC 24/320/5/50. Mild sedation and patient easily awakens to voice. Currently sitting in chair with  at bedside. Able to nod/shake head to questions. She reports comfortable breathing on the vent with mild SOB. Frequent cough with thick sputum. No aches, chills, or sweats. No HA or chest pain. Jose reflux or nausea. No abdominal pain. LBM yesterday, chronic loose stools typically QID.     Review of Systems:     ROS as above, otherwise limited due to intubation and sedation    Physical Exam:     Vital signs:  Temp: 98.6  F (37  C) Temp src: Axillary BP: 92/58 Pulse: 66   Resp: 24 SpO2: 100 % O2 Device: Mechanical Ventilator     Weight: 60.4 kg (133 lb 2.5 oz)  I/O:     Intake/Output Summary (Last 24 hours) at 1/25/2021 1100  Last data filed at 1/25/2021 0700  Gross per 24 hour   Intake 1501.41 ml   Output 105 ml   Net 1396.41 ml     Constitutional: sitting in recliner, alert, in no apparent distress.   HEENT: Eyes with pink conjunctivae, anicteric.  Neck supple without lymphadenopathy.   PULM: Fair air flow bilaterally. Coarse crackles and rhonchi t/o, no wheezes. Non-labored breathing on ventilator.  CV: Normal S1 and S2. RRR. No murmur,  gallop, or rub. No peripheral edema.   ABD: NABS, soft, nontender, nondistended.    MSK: Moves all extremities. No apparent muscle wasting.   NEURO: Alert, communicating by head nod/shake   SKIN: Warm, dry. No rash on limited exam.   PSYCH: calm    Lines, Drains, and Devices:  CVC Double Lumen 10/25/19 Right Internal jugular (Active)   Number of days: 458       CVC TRIPLE LUMEN Right Internal jugular (Active)   Site Assessment WDL 01/25/21 0000   Dressing Type Chlorhexidine sponge;Transparent 01/25/21 0000   Dressing Status clean;dry;intact 01/25/21 0000   Dressing Intervention dressing changed 01/25/21 0000   Dressing Change Due 01/31/21 01/24/21 2000   Line Necessity yes, meets criteria 01/25/21 0000   Blue - Status infusing 01/25/21 0000   Blue - Cap Change Due 01/26/21 01/24/21 2000   Brown - Status infusing 01/25/21 0000   Brown - Cap Change Due 01/26/21 01/24/21 2000   White - Status infusing 01/25/21 0000   White - Cap Change Due 01/26/21 01/24/21 2000   Phlebitis Scale 0-->no symptoms 01/25/21 0000   Infiltration? no 01/25/21 0000   Infiltration Scale 0 01/25/21 0000   Infiltration Site Treatment Method  None 01/25/21 0000   Number of days: 1     Data:     LABS    CMP:   Recent Labs   Lab 01/25/21  0406 01/24/21  1653 01/24/21  1458     --  134   POTASSIUM 4.1 3.8 3.7   CHLORIDE 101  --  98   CO2 16*  --  16*   ANIONGAP 17*  --  20*   *  --  155*   BUN 58*  --  48*   CR 3.73*  --  3.39*   GFRESTIMATED 13*  --  14*   GFRESTBLACK 15*  --  16*   CHELSEY 8.1*  --  8.2*   MAG 2.5* 2.5* 2.4*   PHOS 6.8* 6.5* 6.5*   PROTTOTAL  --   --  6.1*   ALBUMIN  --   --  2.0*   BILITOTAL  --   --  1.2   ALKPHOS  --   --  244*   AST  --   --  31   ALT  --   --  35     CBC:   Recent Labs   Lab 01/25/21  0406 01/24/21  1458   WBC 12.5* 20.5*   RBC 3.59* 3.63*   HGB 10.9* 11.1*   HCT 33.4* 34.6*   MCV 93 95   MCH 30.4 30.6   MCHC 32.6 32.1   RDW 14.4 14.7    192       INR: No lab results found in last 7  days.    Glucose:   Recent Labs   Lab 01/25/21  0820 01/25/21  0413 01/25/21  0406 01/25/21  0052 01/24/21  2012 01/24/21  1744 01/24/21  1458   GLC  --   --  187*  --   --   --  155*   * 180*  --  148* 159* 128* 150*       Blood Gas:   Recent Labs   Lab 01/25/21  0406 01/24/21  1529   PHV 7.37  --    PCO2V 31*  --    PO2V 54*  --    HCO3V 17*  --    O2PER 50.0 100.0       Culture Data   Recent Labs   Lab 01/24/21  1841 01/24/21  1830 01/24/21  1729 01/24/21  1652 01/24/21  1629   CULT No growth after 12 hours PENDING Culture negative monitoring continues No growth after 13 hours Culture negative monitoring continues  PENDING       Virology Data:   Lab Results   Component Value Date    FLUAH1 Not Detected 01/24/2021    FLUAH3 Not Detected 01/24/2021    OT7696 Not Detected 01/24/2021    IFLUB Not Detected 01/24/2021    RSVA Not Detected 01/24/2021    RSVB Not Detected 01/24/2021    PIV1 Not Detected 01/24/2021    PIV2 Not Detected 01/24/2021    PIV3 Not Detected 01/24/2021    HMPV Not Detected 01/24/2021    HRVS Negative 12/23/2020    ADVBE Negative 12/23/2020    ADVC Negative 12/23/2020    ADVC Negative 10/07/2019    ADVC Negative 03/07/2019       Historical CMV results (last 3 of prior testing):  Lab Results   Component Value Date    CMVQNT 675 (A) 12/23/2020    CMVQNT <137 (A) 12/09/2020    CMVQNT <137 (A) 09/21/2020     Lab Results   Component Value Date    CMVLOG 2.8 (H) 12/23/2020    CMVLOG <2.1 12/09/2020    CMVLOG <2.1 09/21/2020       Urine Studies    Recent Labs   Lab Test 01/24/21  1729 10/21/19  2240   URINEPH 5.0 5.0   NITRITE Negative Negative   LEUKEST Moderate* Large*   WBCU 34* 115*       Most Recent Breeze Pulmonary Function Testing (FVC/FEV1 only)  FVC-Pre   Date Value Ref Range Status   12/09/2020 1.12 L    09/09/2020 1.56 L    03/09/2020 1.57 L    02/19/2020 1.78 L      FVC-%Pred-Pre   Date Value Ref Range Status   12/09/2020 34 %    09/09/2020 47 %    03/09/2020 48 %    02/19/2020 54 %       FEV1-Pre   Date Value Ref Range Status   12/09/2020 0.98 L    09/09/2020 1.10 L    03/09/2020 0.96 L    02/19/2020 0.99 L      FEV1-%Pred-Pre   Date Value Ref Range Status   12/09/2020 37 %    09/09/2020 42 %    03/09/2020 37 %    02/19/2020 38 %        IMAGING    Recent Results (from the past 48 hour(s))   XR Chest Port 1 View    Narrative    EXAM: XR CHEST PORT 1 VW  1/24/2021 3:08 PM     HISTORY:  s/p b/l lung transplant w/new HRF, also confirm line/tube  placement       COMPARISON:  Chest x-ray 12/9/2020    FINDINGS:   Portable supine view of the chest. Post surgical changes of bilateral  lung transplant with mediastinal surgical clips and intact medial  sternotomy wires. Endotracheal tube tip projects over the low thoracic  trachea. Right internal jugular central venous catheter tip projects  at the superior cavoatrial junction. Enteric tube tip and sidehole  projected within the stomach.    The trachea is midline. Cardiac silhouette is within normal limits.  There patchy airspace opacities bilaterally. Small right pleural  effusion with overlying basilar opacities. No pneumothorax.      Impression    IMPRESSION:   1. Endotracheal tube tip projects over the low thoracic trachea.  2. Right internal jugular central venous catheter tip projects over  the superior cavoatrial junction.  2. Enteric tube tip and sidehole projected within the stomach.  4. Patchy airspace opacities bilaterally, suggestive of edema versus  infection.  5. Small right pleural effusion.    I have personally reviewed the examination and initial interpretation  and I agree with the findings.    MEHNAZ CHAPMAN MD   XR Chest Port 1 View    Narrative    Portable chest    INDICATION: Acute hypoxemic respiratory failure    COMPARISON: 1/24/2021    FINDINGS: Heart size remains normal. Right IJ catheter tip appears in  the region of the SVC/right atrial junction. There is some coiling at  the superior aspect of the tubing, this could be  outside of the  patient, please correlate clinically to ensure that this is not coiled  within a vascular structure. No pneumothorax. Median sternotomy again  present. Opacities in the lungs appears similar along with blunting of  right costophrenic angle.  Endotracheal tube tip there is approximately 2.3 cm above the gee.  Surgical clips in the right axillary region are present. NG/OG tube  tip and sidehole projected within the stomach.      Impression    IMPRESSION: Continued opacification in the lungs which may represent  infection or edema. No significant change. Unchanged small right  pleural effusion.    TERRI ISSA MD

## 2021-01-26 ENCOUNTER — APPOINTMENT (OUTPATIENT)
Dept: PHYSICAL THERAPY | Facility: CLINIC | Age: 61
End: 2021-01-26
Attending: INTERNAL MEDICINE
Payer: COMMERCIAL

## 2021-01-26 ENCOUNTER — APPOINTMENT (OUTPATIENT)
Dept: SPEECH THERAPY | Facility: CLINIC | Age: 61
End: 2021-01-26
Attending: INTERNAL MEDICINE
Payer: COMMERCIAL

## 2021-01-26 ENCOUNTER — APPOINTMENT (OUTPATIENT)
Dept: GENERAL RADIOLOGY | Facility: CLINIC | Age: 61
End: 2021-01-26
Attending: INTERNAL MEDICINE
Payer: COMMERCIAL

## 2021-01-26 LAB
ALBUMIN SERPL-MCNC: 2 G/DL (ref 3.4–5)
ALP SERPL-CCNC: 184 U/L (ref 40–150)
ALT SERPL W P-5'-P-CCNC: 30 U/L (ref 0–50)
ANION GAP SERPL CALCULATED.3IONS-SCNC: 15 MMOL/L (ref 3–14)
AST SERPL W P-5'-P-CCNC: 11 U/L (ref 0–45)
BACTERIA SPEC CULT: NO GROWTH
BASE DEFICIT BLDV-SCNC: 2.7 MMOL/L
BILIRUB DIRECT SERPL-MCNC: 0.6 MG/DL (ref 0–0.2)
BILIRUB SERPL-MCNC: 0.8 MG/DL (ref 0.2–1.3)
BUN SERPL-MCNC: 72 MG/DL (ref 7–30)
CALCIUM SERPL-MCNC: 8 MG/DL (ref 8.5–10.1)
CHLORIDE SERPL-SCNC: 100 MMOL/L (ref 94–109)
CO2 SERPL-SCNC: 19 MMOL/L (ref 20–32)
CREAT SERPL-MCNC: 4.45 MG/DL (ref 0.52–1.04)
ERYTHROCYTE [DISTWIDTH] IN BLOOD BY AUTOMATED COUNT: 13.7 % (ref 10–15)
GFR SERPL CREATININE-BSD FRML MDRD: 10 ML/MIN/{1.73_M2}
GLUCOSE BLDC GLUCOMTR-MCNC: 155 MG/DL (ref 70–99)
GLUCOSE BLDC GLUCOMTR-MCNC: 171 MG/DL (ref 70–99)
GLUCOSE BLDC GLUCOMTR-MCNC: 216 MG/DL (ref 70–99)
GLUCOSE BLDC GLUCOMTR-MCNC: 245 MG/DL (ref 70–99)
GLUCOSE BLDC GLUCOMTR-MCNC: 264 MG/DL (ref 70–99)
GLUCOSE SERPL-MCNC: 276 MG/DL (ref 70–99)
HBV SURFACE AB SERPL IA-ACNC: 0 M[IU]/ML
HBV SURFACE AG SERPL QL IA: NONREACTIVE
HCO3 BLDV-SCNC: 21 MMOL/L (ref 21–28)
HCT VFR BLD AUTO: 29.7 % (ref 35–47)
HGB BLD-MCNC: 10.5 G/DL (ref 11.7–15.7)
IGE SERPL-ACNC: <2 KIU/L (ref 0–114)
INTERPRETATION ECG - MUSE: NORMAL
LDH SERPL L TO P-CCNC: 187 U/L (ref 81–234)
MAGNESIUM SERPL-MCNC: 2.8 MG/DL (ref 1.6–2.3)
MCH RBC QN AUTO: 30.4 PG (ref 26.5–33)
MCHC RBC AUTO-ENTMCNC: 35.4 G/DL (ref 31.5–36.5)
MCV RBC AUTO: 86 FL (ref 78–100)
O2/TOTAL GAS SETTING VFR VENT: 45 %
PCO2 BLDV: 33 MM HG (ref 40–50)
PH BLDV: 7.42 PH (ref 7.32–7.43)
PHOSPHATE SERPL-MCNC: 7 MG/DL (ref 2.5–4.5)
PLATELET # BLD AUTO: 252 10E9/L (ref 150–450)
PO2 BLDV: 49 MM HG (ref 25–47)
POSACONAZOLE SERPL-MCNC: 3.2 UG/ML (ref 0.7–5)
POTASSIUM SERPL-SCNC: 4 MMOL/L (ref 3.4–5.3)
PROT SERPL-MCNC: 6 G/DL (ref 6.8–8.8)
PROT SERPL-MCNC: 6 G/DL (ref 6.8–8.8)
RBC # BLD AUTO: 3.45 10E12/L (ref 3.8–5.2)
SODIUM SERPL-SCNC: 134 MMOL/L (ref 133–144)
SPECIMEN SOURCE: NORMAL
VANCOMYCIN SERPL-MCNC: 31.4 MG/L
WBC # BLD AUTO: 16.9 10E9/L (ref 4–11)

## 2021-01-26 PROCEDURE — 80202 ASSAY OF VANCOMYCIN: CPT | Performed by: NURSE PRACTITIONER

## 2021-01-26 PROCEDURE — 74018 RADEX ABDOMEN 1 VIEW: CPT | Mod: 26 | Performed by: RADIOLOGY

## 2021-01-26 PROCEDURE — 250N000013 HC RX MED GY IP 250 OP 250 PS 637: Performed by: NURSE PRACTITIONER

## 2021-01-26 PROCEDURE — 94640 AIRWAY INHALATION TREATMENT: CPT

## 2021-01-26 PROCEDURE — 93005 ELECTROCARDIOGRAM TRACING: CPT

## 2021-01-26 PROCEDURE — 92610 EVALUATE SWALLOWING FUNCTION: CPT | Mod: GN

## 2021-01-26 PROCEDURE — 5A1D70Z PERFORMANCE OF URINARY FILTRATION, INTERMITTENT, LESS THAN 6 HOURS PER DAY: ICD-10-PCS | Performed by: INTERNAL MEDICINE

## 2021-01-26 PROCEDURE — 250N000012 HC RX MED GY IP 250 OP 636 PS 637: Performed by: INTERNAL MEDICINE

## 2021-01-26 PROCEDURE — 80076 HEPATIC FUNCTION PANEL: CPT | Performed by: NURSE PRACTITIONER

## 2021-01-26 PROCEDURE — 94640 AIRWAY INHALATION TREATMENT: CPT | Mod: 76

## 2021-01-26 PROCEDURE — 97116 GAIT TRAINING THERAPY: CPT | Mod: GP

## 2021-01-26 PROCEDURE — 999N001017 HC STATISTIC GLUCOSE BY METER IP

## 2021-01-26 PROCEDURE — 250N000011 HC RX IP 250 OP 636: Performed by: STUDENT IN AN ORGANIZED HEALTH CARE EDUCATION/TRAINING PROGRAM

## 2021-01-26 PROCEDURE — 272N000078 HC NUTRITION PRODUCT INTERMEDIATE LITER

## 2021-01-26 PROCEDURE — 99291 CRITICAL CARE FIRST HOUR: CPT | Mod: GC | Performed by: INTERNAL MEDICINE

## 2021-01-26 PROCEDURE — 94003 VENT MGMT INPAT SUBQ DAY: CPT

## 2021-01-26 PROCEDURE — 44500 INTRO GASTROINTESTINAL TUBE: CPT

## 2021-01-26 PROCEDURE — 250N000011 HC RX IP 250 OP 636: Performed by: NURSE PRACTITIONER

## 2021-01-26 PROCEDURE — 250N000009 HC RX 250: Performed by: STUDENT IN AN ORGANIZED HEALTH CARE EDUCATION/TRAINING PROGRAM

## 2021-01-26 PROCEDURE — 94668 MNPJ CHEST WALL SBSQ: CPT

## 2021-01-26 PROCEDURE — 200N000002 HC R&B ICU UMMC

## 2021-01-26 PROCEDURE — 83735 ASSAY OF MAGNESIUM: CPT | Performed by: NURSE PRACTITIONER

## 2021-01-26 PROCEDURE — 272N000079 HC NUTRITION PRODUCT RENAL BASIC LITER

## 2021-01-26 PROCEDURE — 999N000065 XR ABDOMEN PORT 1 VW

## 2021-01-26 PROCEDURE — 84100 ASSAY OF PHOSPHORUS: CPT | Performed by: NURSE PRACTITIONER

## 2021-01-26 PROCEDURE — P9041 ALBUMIN (HUMAN),5%, 50ML: HCPCS | Performed by: CLINICAL NURSE SPECIALIST

## 2021-01-26 PROCEDURE — 92526 ORAL FUNCTION THERAPY: CPT | Mod: GN

## 2021-01-26 PROCEDURE — 250N000013 HC RX MED GY IP 250 OP 250 PS 637: Performed by: STUDENT IN AN ORGANIZED HEALTH CARE EDUCATION/TRAINING PROGRAM

## 2021-01-26 PROCEDURE — 250N000012 HC RX MED GY IP 250 OP 636 PS 637: Performed by: NURSE PRACTITIONER

## 2021-01-26 PROCEDURE — 250N000009 HC RX 250: Performed by: NURSE PRACTITIONER

## 2021-01-26 PROCEDURE — 85027 COMPLETE CBC AUTOMATED: CPT | Performed by: NURSE PRACTITIONER

## 2021-01-26 PROCEDURE — 999N000054 HC STATISTIC EKG NON-CHARGEABLE

## 2021-01-26 PROCEDURE — 86706 HEP B SURFACE ANTIBODY: CPT | Performed by: CLINICAL NURSE SPECIALIST

## 2021-01-26 PROCEDURE — 93010 ELECTROCARDIOGRAM REPORT: CPT | Mod: 76 | Performed by: INTERNAL MEDICINE

## 2021-01-26 PROCEDURE — 250N000011 HC RX IP 250 OP 636: Performed by: CLINICAL NURSE SPECIALIST

## 2021-01-26 PROCEDURE — 90935 HEMODIALYSIS ONE EVALUATION: CPT | Performed by: INTERNAL MEDICINE

## 2021-01-26 PROCEDURE — 250N000009 HC RX 250: Performed by: CLINICAL NURSE SPECIALIST

## 2021-01-26 PROCEDURE — 90937 HEMODIALYSIS REPEATED EVAL: CPT

## 2021-01-26 PROCEDURE — 99233 SBSQ HOSP IP/OBS HIGH 50: CPT | Performed by: INTERNAL MEDICINE

## 2021-01-26 PROCEDURE — 80048 BASIC METABOLIC PNL TOTAL CA: CPT | Performed by: NURSE PRACTITIONER

## 2021-01-26 PROCEDURE — 999N000157 HC STATISTIC RCP TIME EA 10 MIN

## 2021-01-26 PROCEDURE — 80187 DRUG ASSAY POSACONAZOLE: CPT | Performed by: NURSE PRACTITIONER

## 2021-01-26 PROCEDURE — 258N000003 HC RX IP 258 OP 636: Performed by: NURSE PRACTITIONER

## 2021-01-26 PROCEDURE — 83615 LACTATE (LD) (LDH) ENZYME: CPT | Performed by: NURSE PRACTITIONER

## 2021-01-26 PROCEDURE — 258N000003 HC RX IP 258 OP 636: Performed by: CLINICAL NURSE SPECIALIST

## 2021-01-26 PROCEDURE — 87340 HEPATITIS B SURFACE AG IA: CPT | Performed by: CLINICAL NURSE SPECIALIST

## 2021-01-26 PROCEDURE — 82803 BLOOD GASES ANY COMBINATION: CPT | Performed by: STUDENT IN AN ORGANIZED HEALTH CARE EDUCATION/TRAINING PROGRAM

## 2021-01-26 PROCEDURE — 84155 ASSAY OF PROTEIN SERUM: CPT | Performed by: NURSE PRACTITIONER

## 2021-01-26 PROCEDURE — 97530 THERAPEUTIC ACTIVITIES: CPT | Mod: GP

## 2021-01-26 PROCEDURE — 258N000003 HC RX IP 258 OP 636: Performed by: STUDENT IN AN ORGANIZED HEALTH CARE EDUCATION/TRAINING PROGRAM

## 2021-01-26 RX ORDER — HEPARIN SODIUM 1000 [USP'U]/ML
500 INJECTION, SOLUTION INTRAVENOUS; SUBCUTANEOUS CONTINUOUS
Status: DISCONTINUED | OUTPATIENT
Start: 2021-01-26 | End: 2021-01-26

## 2021-01-26 RX ORDER — ALBUMIN, HUMAN INJ 5% 5 %
250 SOLUTION INTRAVENOUS
Status: COMPLETED | OUTPATIENT
Start: 2021-01-26 | End: 2021-01-26

## 2021-01-26 RX ORDER — LIDOCAINE HYDROCHLORIDE 20 MG/ML
5 SOLUTION OROPHARYNGEAL ONCE
Status: COMPLETED | OUTPATIENT
Start: 2021-01-26 | End: 2021-01-26

## 2021-01-26 RX ORDER — VALGANCICLOVIR HYDROCHLORIDE 50 MG/ML
450 POWDER, FOR SOLUTION ORAL
Status: DISCONTINUED | OUTPATIENT
Start: 2021-01-26 | End: 2021-02-25

## 2021-01-26 RX ORDER — B COMPLEX C NO.10/FOLIC ACID 900MCG/5ML
5 LIQUID (ML) ORAL DAILY
Status: DISCONTINUED | OUTPATIENT
Start: 2021-01-26 | End: 2021-01-29 | Stop reason: CLARIF

## 2021-01-26 RX ORDER — HEPARIN SODIUM 1000 [USP'U]/ML
500 INJECTION, SOLUTION INTRAVENOUS; SUBCUTANEOUS
Status: COMPLETED | OUTPATIENT
Start: 2021-01-26 | End: 2021-01-26

## 2021-01-26 RX ADMIN — TACROLIMUS 0.5 MG: 5 CAPSULE ORAL at 18:11

## 2021-01-26 RX ADMIN — IPRATROPIUM BROMIDE AND ALBUTEROL SULFATE 3 ML: .5; 3 SOLUTION RESPIRATORY (INHALATION) at 07:32

## 2021-01-26 RX ADMIN — HEPARIN SODIUM 500 UNITS/HR: 1000 INJECTION INTRAVENOUS; SUBCUTANEOUS at 16:56

## 2021-01-26 RX ADMIN — SODIUM CHLORIDE 300 ML: 9 INJECTION, SOLUTION INTRAVENOUS at 16:56

## 2021-01-26 RX ADMIN — HYDROCORTISONE SODIUM SUCCINATE 50 MG: 100 INJECTION, POWDER, FOR SOLUTION INTRAMUSCULAR; INTRAVENOUS at 18:11

## 2021-01-26 RX ADMIN — SODIUM CHLORIDE 300 MG: 9 INJECTION, SOLUTION INTRAVENOUS at 12:33

## 2021-01-26 RX ADMIN — ACETYLCYSTEINE 2 ML: 200 SOLUTION ORAL; RESPIRATORY (INHALATION) at 11:41

## 2021-01-26 RX ADMIN — IPRATROPIUM BROMIDE AND ALBUTEROL SULFATE 3 ML: .5; 3 SOLUTION RESPIRATORY (INHALATION) at 15:56

## 2021-01-26 RX ADMIN — PIPERACILLIN AND TAZOBACTAM 2.25 G: 2; .25 INJECTION, POWDER, FOR SOLUTION INTRAVENOUS at 05:23

## 2021-01-26 RX ADMIN — PIPERACILLIN AND TAZOBACTAM 2.25 G: 2; .25 INJECTION, POWDER, FOR SOLUTION INTRAVENOUS at 18:11

## 2021-01-26 RX ADMIN — ALBUMIN HUMAN 250 ML: 0.05 INJECTION, SOLUTION INTRAVENOUS at 16:55

## 2021-01-26 RX ADMIN — HYDROCORTISONE SODIUM SUCCINATE 50 MG: 100 INJECTION, POWDER, FOR SOLUTION INTRAMUSCULAR; INTRAVENOUS at 05:23

## 2021-01-26 RX ADMIN — Medication 5 ML: at 16:08

## 2021-01-26 RX ADMIN — INSULIN ASPART 1 UNITS: 100 INJECTION, SOLUTION INTRAVENOUS; SUBCUTANEOUS at 21:07

## 2021-01-26 RX ADMIN — IPRATROPIUM BROMIDE AND ALBUTEROL SULFATE 3 ML: .5; 3 SOLUTION RESPIRATORY (INHALATION) at 19:47

## 2021-01-26 RX ADMIN — INSULIN ASPART 4 UNITS: 100 INJECTION, SOLUTION INTRAVENOUS; SUBCUTANEOUS at 11:43

## 2021-01-26 RX ADMIN — HEPARIN SODIUM 5000 UNITS: 5000 INJECTION, SOLUTION INTRAVENOUS; SUBCUTANEOUS at 11:28

## 2021-01-26 RX ADMIN — Medication 40 MG: at 16:09

## 2021-01-26 RX ADMIN — INSULIN ASPART 2 UNITS: 100 INJECTION, SOLUTION INTRAVENOUS; SUBCUTANEOUS at 16:36

## 2021-01-26 RX ADMIN — INSULIN ASPART 5 UNITS: 100 INJECTION, SOLUTION INTRAVENOUS; SUBCUTANEOUS at 04:31

## 2021-01-26 RX ADMIN — LIDOCAINE HYDROCHLORIDE 0.5 ML: 10 INJECTION, SOLUTION EPIDURAL; INFILTRATION; INTRACAUDAL; PERINEURAL at 16:56

## 2021-01-26 RX ADMIN — LIDOCAINE HYDROCHLORIDE 0.5 ML: 10 INJECTION, SOLUTION EPIDURAL; INFILTRATION; INTRACAUDAL; PERINEURAL at 16:55

## 2021-01-26 RX ADMIN — Medication 100 MCG/HR: at 05:05

## 2021-01-26 RX ADMIN — VALGANCICLOVIR HYDROCHLORIDE 450 MG: 50 POWDER, FOR SOLUTION ORAL at 21:03

## 2021-01-26 RX ADMIN — BUDESONIDE 0.5 MG: 0.5 INHALANT ORAL at 07:32

## 2021-01-26 RX ADMIN — Medication: at 16:57

## 2021-01-26 RX ADMIN — POLYETHYLENE GLYCOL 3350 17 G: 17 POWDER, FOR SOLUTION ORAL at 21:04

## 2021-01-26 RX ADMIN — PIPERACILLIN AND TAZOBACTAM 2.25 G: 2; .25 INJECTION, POWDER, FOR SOLUTION INTRAVENOUS at 11:25

## 2021-01-26 RX ADMIN — SODIUM CHLORIDE 500 ML: 9 INJECTION, SOLUTION INTRAVENOUS at 14:15

## 2021-01-26 RX ADMIN — BUDESONIDE 0.5 MG: 0.5 INHALANT ORAL at 19:47

## 2021-01-26 RX ADMIN — IPRATROPIUM BROMIDE AND ALBUTEROL SULFATE 3 ML: .5; 3 SOLUTION RESPIRATORY (INHALATION) at 11:42

## 2021-01-26 RX ADMIN — SULFAMETHOXAZOLE AND TRIMETHOPRIM 295 MG: 200; 40 SUSPENSION ORAL at 21:03

## 2021-01-26 RX ADMIN — ACETYLCYSTEINE 2 ML: 200 SOLUTION ORAL; RESPIRATORY (INHALATION) at 19:47

## 2021-01-26 RX ADMIN — LIDOCAINE HYDROCHLORIDE 5 ML: 20 SOLUTION ORAL; TOPICAL at 11:21

## 2021-01-26 RX ADMIN — TACROLIMUS 1 MG: 5 CAPSULE ORAL at 16:13

## 2021-01-26 RX ADMIN — HEPARIN SODIUM 500 UNITS: 1000 INJECTION INTRAVENOUS; SUBCUTANEOUS at 16:30

## 2021-01-26 RX ADMIN — HEPARIN SODIUM 5000 UNITS: 5000 INJECTION, SOLUTION INTRAVENOUS; SUBCUTANEOUS at 16:08

## 2021-01-26 RX ADMIN — SULFAMETHOXAZOLE AND TRIMETHOPRIM 295 MG: 200; 40 SUSPENSION ORAL at 16:14

## 2021-01-26 RX ADMIN — ACETYLCYSTEINE 2 ML: 200 SOLUTION ORAL; RESPIRATORY (INHALATION) at 15:56

## 2021-01-26 RX ADMIN — HYDROCORTISONE SODIUM SUCCINATE 50 MG: 100 INJECTION, POWDER, FOR SOLUTION INTRAMUSCULAR; INTRAVENOUS at 11:29

## 2021-01-26 RX ADMIN — ACETYLCYSTEINE 2 ML: 200 SOLUTION ORAL; RESPIRATORY (INHALATION) at 07:32

## 2021-01-26 ASSESSMENT — ACTIVITIES OF DAILY LIVING (ADL)
ADLS_ACUITY_SCORE: 12
ADLS_ACUITY_SCORE: 16
ADLS_ACUITY_SCORE: 16
ADLS_ACUITY_SCORE: 12
ADLS_ACUITY_SCORE: 16
ADLS_ACUITY_SCORE: 12

## 2021-01-26 ASSESSMENT — MIFFLIN-ST. JEOR: SCORE: 1158

## 2021-01-26 NOTE — PROGRESS NOTES
Pt extubated to NC 4lpm with SaO2 98%.  Good cough prod sm amt thick pale yellow mucus.  BS clear BUL with crackles bbil in the bases.

## 2021-01-26 NOTE — PHARMACY-VANCOMYCIN DOSING SERVICE
Pharmacy Vancomycin Note  Date of Service 2021  Patient's  1960   60 year old, female    Indication: Sepsis  Goal Trough Level: 15-20 mg/L  Day of Therapy: 2  Current Vancomycin regimen:  Intermittent due to iHD     Current estimated CrCl = iHD    Recent Vancomycin Levels (past 3 days)  2021:  4:25 AM Vancomycin Level 31.4 mg/L ~ pre-iHD     Vancomycin IV Administrations (past 72 hours)                   vancomycin (VANCOCIN) 1000 mg in dextrose 5% 200 mL PREMIX (mg) 1,000 mg New Bag 21 1751                Nephrotoxins and other renal medications (From now, onward)    Start     Dose/Rate Route Frequency Ordered Stop    21 0800  tacrolimus (GENERIC EQUIVALENT) suspension 1 mg      1 mg Oral or Feeding Tube EVERY MORNING. 21 1536      21 1800  tacrolimus (GENERIC EQUIVALENT) suspension 0.5 mg      0.5 mg Oral or Feeding Tube EVERY EVENING. 21 1443      21 1630  piperacillin-tazobactam (ZOSYN) 2.25 g vial to attach to  ml bag      2.25 g  over 30 Minutes Intravenous EVERY 6 HOURS 21 1443      21 1528  vancomycin place jordan - receiving intermittent dosing      1 each Does not apply SEE ADMIN INSTRUCTIONS 21 1528      21 1500  norepinephrine (LEVOPHED) 16 mg in  mL infusion CENTRAL LINE      0.03-0.4 mcg/kg/min × 58.6 kg (Dosing Weight)  1.6-22 mL/hr  Intravenous CONTINUOUS 21 1443            Interpretation of levels and current regimen:  Trough level is  Supratherapeutic, but pre-iHD  Has serum creatinine changed > 50% in last 72 hours: No  Urine output:  iHD      Plan:  1.  Post iHD today (21) level will likely be ~ 21 mcg/mL, will NOT redose vancomycin today and will reassess with next iHD session     Dyana Case, PharmD  MICU Pharmacist  [phone]  [phone]          .

## 2021-01-26 NOTE — PLAN OF CARE
Major Shift Events:  Pt refused repositioning most of the shift as well as refused pillows under her arms and heels all of the shift. Educated pt on the benefits of repositioning, but pt only allowed it to be done once at the end of the shift. Skin on her back at that time was wrinkled but intact.   Plan: Continue to monitor and continue with the current POC.  For vital signs and complete assessments, please see documentation flowsheets.        Problem: Adult Inpatient Plan of Care  Goal: Plan of Care Review  Outcome: No Change  Flowsheets (Taken 1/26/2021 0633)  Plan of Care Reviewed With: patient     Problem: Adult Inpatient Plan of Care  Goal: Absence of Hospital-Acquired Illness or Injury  Intervention: Identify and Manage Fall Risk  Recent Flowsheet Documentation  Taken 1/26/2021 0400 by Fortunato Hannah, RN  Safety Promotion/Fall Prevention: fall prevention program maintained  Taken 1/26/2021 0000 by Fortunato Hannah, RN  Safety Promotion/Fall Prevention: fall prevention program maintained  Taken 1/25/2021 2000 by Fortunato Hannah RN  Safety Promotion/Fall Prevention: fall prevention program maintained     Problem: Adult Inpatient Plan of Care  Goal: Absence of Hospital-Acquired Illness or Injury  Intervention: Prevent Skin Injury  Recent Flowsheet Documentation  Taken 1/26/2021 0545 by Fortunato Hannah, RN  Body Position:   right   side-lying   turned  Taken 1/26/2021 0400 by Fortunato Hannah, RN  Body Position:   refuses positioning   supine  Taken 1/26/2021 0200 by Fortunato Hannah, RN  Body Position: refuses positioning  Taken 1/26/2021 0000 by Fortunato Hannah, RN  Body Position:   refuses positioning   supine  Taken 1/25/2021 2145 by Fortunato Hannah, RN  Body Position: sitting up in bed  Taken 1/25/2021 2000 by Fortunato Hannah, RN  Body Position: position changed independently     Problem: Adult Inpatient Plan of Care  Goal: Absence of Hospital-Acquired Illness or Injury  Intervention: Prevent and Manage VTE (Venous  Thromboembolism) Risk  Recent Flowsheet Documentation  Taken 1/26/2021 0400 by Fortunato Hannah, RN  VTE Prevention/Management:   anticoagulant therapy maintained   bleeding precautions maintained   AROM (active range of motion) performed  Taken 1/26/2021 0000 by Fortunato Hannah, RN  VTE Prevention/Management:   anticoagulant therapy maintained   bleeding precautions maintained   AROM (active range of motion) performed  Taken 1/25/2021 2000 by Fortunato Hannah, RN  VTE Prevention/Management:   anticoagulant therapy maintained   bleeding precautions maintained   AROM (active range of motion) performed     Problem: ARDS (Acute Respiratory Distress Syndrome)  Goal: Effective Oxygenation  Outcome: No Change     Problem: ARDS (Acute Respiratory Distress Syndrome)  Goal: Effective Oxygenation  Intervention: Optimize Oxygenation, Ventilation and Perfusion  Flowsheets (Taken 1/26/2021 0633)  Airway/Ventilation Management:   airway patency maintained   humidification applied   pulmonary hygiene promoted

## 2021-01-26 NOTE — PLAN OF CARE
ICU End of Shift Summary. See flowsheets for vital signs and detailed assessment.    Changes this shift: Able to wean off vasopressin, only on norepi for BP support. Thoracentesis at bedside, diagnostic, samples sent to lab, tolerated procedure well. Minimal urine output, ennis from normal for patient. No BM. Small amount of tank, pink tinged secretions, started on chest physiotherapy per RT. Up to chair transfer x 2 assist with therapy. Echo completed. Failed PS trial x 2 due to low tidal volumes.     Plan: Reduce sedation. Continue IVABx, monitor for micro results. Continue chest physiotherapy. Work with therapies and up out of bed. Wean norepi as BP allows. Neph consult, likely will require CRRT tomorrow and will need a dialysis line. Update  as able.

## 2021-01-26 NOTE — PROGRESS NOTES
Nephrology Dialysis Note    This patient was seen and examined while on dialysis. Laboratory results and nurses' notes were reviewed. Tolerated treatment, but UF limited of 350cc due to BP.    No changes to management of anemia, BMD, acidosis, or electrolytes. Additional management recommendations per Hardy CHAVEZ, please see his note for further details.    Diagnosis - ESRD    HOLLI Solis MD  [phone]

## 2021-01-26 NOTE — PROCEDURES
Small Bowel Feeding Tube Placement Assessment  Reason for Feeding Tube Placement: post-pyloric feeding tube per pulm transplant request for nutrition and medication administration  Cortrak Start Time: 09:30; 14:30  Cortrak End Time: 10:30; 15:00  Medicine Delivered During Procedure: lidocaine gel  Placement Successful: No, presumed gastric, AXR pending    Procedure Complications: coiling of FT in stomach; unable to get past pylorus  Final Placement Fortunato at exit of nare 78 cm  Face to Face time with patient: 120 minutes    Bridle Placement:   Reason for bridle placement: securement of feeding tube   Medicine delivered during procedure: lidocaine gel   Procedure: Successful  Location of top of clip on FT: @ 80 cm marker   Condition of nose/skin at time of bridle placement: Unremarkable   Face to Face time with patient: 10 minutes.    Keri Gomez, MS, RD, LD, [facility]  MICU pager: [phone]  ASCOM: [id number]

## 2021-01-26 NOTE — PROGRESS NOTES
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 She was transferred to OSH ICU 1/22 and intubated and requiring vasopressors. 1/24 transferred to [facility] Medical ICU service for ongoing management. Outside chest CT showed bilateral infiltrates.  Covid-19 negative X3 at OSH. Procalcitonin negative. Urine strep and RPP negative.  - Bronch completed, KOH and Gram not revealing,  with neutrophil predominance, bronch cultures still pending.  - IgE was not elevated and unlikely ABPA.  - Diagnostic thoracentesis on 1/25/2021, overall exudative with inflammatory cells but no growth.  - CMV negative  - F/U other micro- UA with reflex, legionella, histo, EBV, BCx and bronch cultures  - Continue antibiotic treatment with vanc, zosyn, and bactrim (steno history).   - Transplant team following.    Acute Issues  1. Acute Hypoxic Respiratory Failure likely sec to bilateral pneumonia. On minimal vent settings. Did well with SBT, will attempt extubation today. No growth so far from cultures. Responding to current antibiotics regimen.   2. 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 Transplant team following. Continue PTA tacrolimus, levels checked, Hold PTA prednisone while on stress steroids.  3. Chronic/stable right aspergillus empyema s/p diagnostic thoracentesis on  1/25/2021, exudative with inflammatory cells, no growth yet.  4. Septic shock on pressors - Wean off as tolerated. Going down on pressor needs. Will give fluids post extubation as patient appears to be on the dry side.  5. ESRD on HD - Nephrology on board for HD needs.    The patient was seen and examined with the resident/fellow physician.  We have discussed the patient in detail and I agree with the findings, assessment, and plan as documented when this note was cosigned on this day. The plan was formulated in conjunction with pharmacy, ICU nurses, and respiratory therapist. I have evaluated all laboratory values and imaging studies for the past 24 hours. I have reviewed all the consults that have been ordered and are active for this patient.      Critical Care Time: 30 min.  I spent this time (excluding procedures) personally providing and directing critical care services at the bedside and on the critical care unit.      Luna Jean MD  Pulmonary, Critical Care and Sleep Medicine  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 She was transferred to OSH ICU 1/22 and intubated and requiring vasopressors. 1/24 transferred to [facility] Medical ICU service for ongoing management.     CHANGES and MAJOR THINGS TODAY:   - Patient extubated following successful PST this AM  - Levophed ordered Map goal > 65, weaning as tolerated, now off vaso   - Will run dialysis via fistula today  - Continue antibiotic treatment with vanc, zosyn, and bactrim (steno  history), f/u work up resuts     PLAN:     Neuro:  # Pain and sedation  - no longer sedated following extubation     Pulmonary:  # Acute Hypoxic Respiratory Failure  Suspected 2/2 pneumonia. OSH CT 1/23 + bilateral ground glass opacities and consolidative lung infiltrates centrally distributed to the upper lobes and RLL, . Covid-19 negative X3 at OSH.   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  - Continue PTA tacrolimus  - Hold PTA prednisone while on stress steroids  - Pulmonary lung transplant team consulted, appreciate recs     Cardiovascular:  # Shock  # HX Hypertension  Patient was transferred on Levophed. Right heart catheterization 03/10/2020 concluded right sided filling pressures mildly elevated, mild PHTN, mild right sided filling pressures, and normal cardiac output. Last ECHO 10/21/20 with EF 60-65%, normal LV & RV function.   - Levophed ordered Map goal > 65, weaning as tolerated, now off vaso  - Stress dose steroids ordered  - Hold PTA coreg and amlodipine      GI/Nutrition:  #Portal HTN  Liver biopsy positive for congestive hepatopathy. Previous had ascites thought to be r/t elevated right sided heart pressures. Last saw GI on 12/21/20 and patient endorsed that her ascites is no longer present.   - NTD     #Nutrition  - RD consulted for Tube Feeds  - RD placed post pyloric tube     Renal/Fluids/Electrolytes:  # ESRD on iHD twice weekly (M/Th).   # Anion gap metabolic acidosis r/t ESRD, uremia (OSH ABG 7.31/37/120/19 & Anion Gap 25.7)   Last dialyzed 1/22 in [place]. Outpatient dry body weight 56.5 kg.   - Nephrology Consulted   - Will run dialysis via fistula today  - I&O  - Daily  Weights   - Trend BMP      Endocrine:  # Seronegative RA with Raynaud's   - NTD, monitor      High intensity sliding scale insulin ordered given stress dose steroids, will consider glargine vs ggt with continued elevations     ID:  # Sepsis   Suspected related to bacterial pneumonia. OSH CT 1/23 + bilateral ground glass opacities and consolidative lung infiltrates centrally distributed to the upper lobes and RLL. Covid-19 negative X3 at OSH. Procalcitonin negative. Urine strep, CMV, and RPP negative.  - Bronch completed, KOH and Gram not revealing,  with neutrophil predominance, other labs pending  -s/p thora 1/25, fluid exudative, micro pending  - F/U other micro w/u BCx, UA with reflex, legionella, histo, EBV  - Continue antibiotic treatment with vanc, zosyn, and bactrim (steno history)     # Chronic/Stable right aspergillus empyema   - Continue PTA posaconazole, will monitor QTc     Hematology:    # Anemia r/t renal disease   # Thrombocytopenia r/t critical illness, resolved   # Leukocytosis, rising in setting of steroids  Takes aranesp 25 mcg every four weeks, last dose 01/14.    - Daily CBC     Musculoskeletal:  - PT/OT when appropriate      Skin:  # Dermatomyositis antitryptase syndrome   - Noted, NTD     General Cares/Prophylaxis:    DVT Prophylaxis: Heparin SQ  GI Prophylaxis: PPI  Restraints: Not indicated  Family Communication:  updated at bedside  Code Status: Full      Lines/tubes/drains:  - Basilio  - CVC  - PIVs     Disposition:  - Medical ICU    Patient seen and findings/plan discussed with medical ICU staff, Dr. Jean.    Tomas Rolle    ====================================  INTERVAL HISTORY:   NAEO. Patient reports that she continues to feel overwhelmed but denies any acute symptoms this AM. She feels comfortable on the breathing machine. She denies any new pains.    OBJECTIVE:   1. VITAL SIGNS:   Temp:  [96.8  F (36  C)-98.3  F (36.8  C)] 96.8  F (36  C)  Pulse:  [63-98] 85  Resp:   [24-30] 24  BP: ()/(49-86) 88/60  FiO2 (%):  [30 %] 30 %  SpO2:  [91 %-100 %] 98 %  Ventilation Mode: CPAP/PS  (Continuous positive airway pressure with Pressure Support)  FiO2 (%): 30 %  Rate Set (breaths/minute): 24 breaths/min  Tidal Volume Set (mL): 320 mL  PEEP (cm H2O): 5 cmH2O  Pressure Support (cm H2O): 7 cmH2O  Oxygen Concentration (%): 30 %  Resp: 24    2. INTAKE/ OUTPUT:   I/O last 3 completed shifts:  In: 1804.94 [I.V.:929.94; NG/GT:435]  Out: 210 [Urine:60; Emesis/NG output:150]    3. PHYSICAL EXAMINATION:  General: Laying in bed. No acute distress.   HEENT: PERRLA. Following commands. Responding appropriately. No focal deficits.  Neuro: A&Ox3, NAD  Pulm/Resp: Breath sounds coarse/dim throughout, improved  CV: RRR  Abdomen: Soft, non-distended, non-tender  :  rivas catheter in place, urine yellow and clear  Incisions/Skin: Left AV fistula with thrill and bruit    4. LABS:   Arterial Blood Gases   Recent Labs   Lab 01/24/21  1529   PH 7.36   PCO2 29*   PO2 177*   HCO3 16*     Complete Blood Count   Recent Labs   Lab 01/26/21  0425 01/25/21  0406 01/24/21  1458   WBC 16.9* 12.5* 20.5*   HGB 10.5* 10.9* 11.1*    167 192     Basic Metabolic Panel  Recent Labs   Lab 01/26/21  0425 01/25/21  0406 01/24/21  1653 01/24/21  1458    135  --  134   POTASSIUM 4.0 4.1 3.8 3.7   CHLORIDE 100 101  --  98   CO2 19* 16*  --  16*   BUN 72* 58*  --  48*   CR 4.45* 3.73*  --  3.39*   * 187*  --  155*     Liver Function Tests  Recent Labs   Lab 01/26/21  0425 01/24/21  1458   AST 11 31   ALT 30 35   ALKPHOS 184* 244*   BILITOTAL 0.8 1.2   ALBUMIN 2.0* 2.0*     Coagulation Profile  No lab results found in last 7 days.    5. RADIOLOGY:   Recent Results (from the past 24 hour(s))   Echo Complete    Narrative    [id number]  [id number]  [id number]  [id number]^ISABELLA^CHRISTINE           [facility],[place]  Echocardiography Laboratory  [address] [place] [id number]     Name:  SARAI KILLIAN  MRN: [id number]  : 1960  Study Date: 2021 01:55 PM  Age: 60 yrs  Gender: Female  Patient Location: [facility]  Reason For Study: Shock  Ordering Physician: CHRISTINE MASON  Performed By: Maya Chin RDCS     BSA: 1.6 m2  Height: 62 in  Weight: 129 lb  HR: 74  BP: 79/52 mmHg  _____________________________________________________________________________  __        Procedure  Complete Portable Echo Adult.  _____________________________________________________________________________  __        Interpretation Summary  Global and regional left ventricular function is normal with an EF of 60-65%.  Right ventricular function, chamber size, wall motion, and thickness are  normal.  IVC diameter >2.1 cm collapsing <50% with sniff suggests a high RA pressure  estimated at 15 mmHg or greater.  _____________________________________________________________________________  __        Left Ventricle  Global and regional left ventricular function is normal with an EF of 60-65%.  Left ventricular wall thickness is normal. Left ventricular size is normal.  Left ventricular diastolic function is normal. No regional wall motion  abnormalities are seen.     Right Ventricle  Right ventricular function, chamber size, wall motion, and thickness are  normal.     Atria  The right atria appears normal. Moderate left atrial enlargement is present.     Mitral Valve  The mitral valve is normal. Trace mitral insufficiency is present.        Aortic Valve  The valve leaflets are not well visualized. On Doppler interrogation, there is  no significant stenosis or regurgitation.     Tricuspid Valve  Mild to moderate tricuspid insufficiency is present. The right ventricular  systolic pressure is approximated at 24.6 mmHg plus the right atrial pressure.     Pulmonic Valve  The pulmonic valve is normal.     Vessels  The aorta root is normal. The pulmonary artery and bifurcation cannot be  assessed. IVC diameter >2.1 cm  collapsing <50% with sniff suggests a high RA  pressure estimated at 15 mmHg or greater.     Pericardium  No pericardial effusion is present.     _____________________________________________________________________________  __  MMode/2D Measurements & Calculations  IVSd: 0.86 cm  LVIDd: 4.9 cm  LVIDs: 3.0 cm  LVPWd: 0.86 cm  FS: 39.4 %     LV mass(C)d: 146.0 grams  LV mass(C)dI: 92.0 grams/m2  asc Aorta Diam: 2.4 cm  LVOT diam: 2.1 cm  LVOT area: 3.4 cm2  LA Volume Index (BP): 45.3 ml/m2  RWT: 0.35  TAPSE: 1.9 cm        Doppler Measurements & Calculations  MV E max herberth: 84.5 cm/sec  MV A max herberth: 62.5 cm/sec  MV E/A: 1.4  MV dec slope: 502.5 cm/sec2  MV dec time: 0.17 sec     PA acc time: 0.10 sec  TR max herberth: 248.0 cm/sec  TR max P.6 mmHg  E/E' avg: 10.1  Lateral E/e': 9.3  Medial E/e': 10.9     _____________________________________________________________________________  __           Report approved by: Glen Zhang 2021 02:40 PM      XR Chest Port 1 View    Narrative    EXAM: XR CHEST PORT 1 VW  2021 6:41 PM     HISTORY:  post left thoracentesis       COMPARISON:  Chest x-ray from same date at 0812 hours    FINDINGS:   Portable upright view of the chest. Endotracheal tube tip projects  over the low thoracic trachea. Right internal jugular central venous  catheter tip projects over the distal SVC, with coiling of the  proximal tubing, unchanged from prior. Scattered mediastinal surgical  clips. Trachea is midline. Cardiac silhouette is stable. No  significant change in the diffuse mixed interstitial and airspace  opacities bilaterally. Small bilateral pleural effusions. No  pneumothorax. Intact median sternotomy wires.      Impression    IMPRESSION:    1. No significant change in the diffuse mixed interstitial and  airspace opacities.  2. Small bilateral pleural effusions.  3. Unchanged coiling of the proximal right internal jugular central  venous catheter. Recommend correlation to ensure  that the coiling is  external to the patient.    I have personally reviewed the examination and initial interpretation  and I agree with the findings.    TALI CARRANZA MD

## 2021-01-26 NOTE — PROGRESS NOTES
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 Nephrology consutled for dialysis needs.     Interval History :   Mrs Blue last had HD on 1/22, up a bit in wt but likely this is a difference in scales as her exam shows no signs of overload.  Will run today mainly for clearance at last run was on 1/22.  Was 2L net positive yesterday so will try to pull 1L on run but is on one pressor so would back of if this increases at all.       Assessment & Recommendations:   ESRD-Runs at [place] through [facility] group.  Has atypical HD schedule of Monday and Thursday, has AVF with partial graft.  Acutely CRRT has been considered but weaned down to 1 pressor and is stable so will try to avoid temp line and use fistula for now.                 -HD today for clearance on 1 pressor, fluid removal is not a priority but will remove 1L of UF if she proves stable.               -Access is L arm AVF/graft     Volume status-Minimal edema on exam, was nearly 2L positive yesterday so will try to pull 1L of UF on run if her pressor needs are stable, currently on NE at 0.12.       Electrolytes/pH-K 4.0, bicarb 19, HD today for clearance.      Ca/phos/pth-Ca 8.0, Mg and Phos mildly up.      Anemia-Hgb 10.5, acute management per team.       Nutrition-Nutren Tf.      Seen and discussed with Dr Solis     Recommendations were communicated to primary team via verbal communication.          ADRIÁN Lo CNS  Clinical Nurse Specialist  [phone]    Review of Systems:   I reviewed the following systems:  Gen: No fevers or chills  CV: No CP at rest  Resp: No  "SOB at rest  GI: No N/V    Physical Exam:   I/O last 3 completed shifts:  In: 1804.94 [I.V.:929.94; NG/GT:435]  Out: 210 [Urine:60; Emesis/NG output:150]   BP 95/71   Pulse 77   Temp 97.2  F (36.2  C) (Axillary)   Resp 17   Ht 1.6 m (5' 2.99\")   Wt 60.9 kg (134 lb 4.2 oz)   LMP 06/07/2014 (Exact Date)   SpO2 95%   BMI 23.79 kg/m       GENERAL APPEARANCE: no distress, intubated but communicating non-verbally.   EYES: no scleral icterus  Endo: no moon facies  Pulmonary: Decreased BS  CV: regular rhythm, normal rate - Edema present  GI: soft, non distended  MS: no evidence of inflammation in joints, no muscle tenderness  :  Basilio present  SKIN: warm, dry,  NEURO: face symmetric     Labs:   All labs reviewed by me  Electrolytes/Renal -   Recent Labs   Lab Test 01/26/21 0425 01/25/21  0406 01/24/21  1653 01/24/21  1458    135  --  134   POTASSIUM 4.0 4.1 3.8 3.7   CHLORIDE 100 101  --  98   CO2 19* 16*  --  16*   BUN 72* 58*  --  48*   CR 4.45* 3.73*  --  3.39*   * 187*  --  155*   CHELSEY 8.0* 8.1*  --  8.2*   MAG 2.8* 2.5* 2.5* 2.4*   PHOS 7.0* 6.8* 6.5* 6.5*       CBC -   Recent Labs   Lab Test 01/26/21 0425 01/25/21  0406 01/24/21  1458   WBC 16.9* 12.5* 20.5*   HGB 10.5* 10.9* 11.1*    167 192       LFTs -   Recent Labs   Lab Test 01/26/21  0425 01/24/21  1458 12/09/20  0721   ALKPHOS 184* 244* 333*   BILITOTAL 0.8 1.2 0.8   ALT 30 35 55*   AST 11 31 28   PROTTOTAL 6.0*  6.0* 6.1* 6.6*   ALBUMIN 2.0* 2.0* 2.9*       Iron Panel -   Recent Labs   Lab Test 12/13/18  1033 08/01/18  0921 05/08/18  0709 05/08/18  0709   IRON 16* 93  --  48   IRONSAT 7* 37  --  18   YOLA 302* 571*   < >  --     < > = values in this interval not displayed.           Current Medications:    sodium chloride 0.9%  300 mL Hemodialysis Machine Once     acetylcysteine  2 mL Nebulization 4x Daily     albumin human  250 mL Intravenous Once in dialysis     budesonide  0.5 mg Nebulization BID     gelatin absorbable  1 each " Topical During Hemodialysis (from stock)     heparin (porcine)  500 Units Hemodialysis Machine Once in dialysis     heparin ANTICOAGULANT  5,000 Units Subcutaneous Q8H     hydrocortisone sodium succinate PF  50 mg Intravenous Q6H     insulin aspart  1-12 Units Subcutaneous Q4H     ipratropium - albuterol 0.5 mg/2.5 mg/3 mL  3 mL Nebulization 4x daily     pantoprazole  40 mg Oral QAM AC     piperacillin-tazobactam  2.25 g Intravenous Q6H     polyethylene glycol  17 g Oral or Feeding Tube Daily     posaconazole (NOXAFIL) intermittent infusion  300 mg Intravenous Daily     [Held by provider] predniSONE  2.5 mg Oral or Feeding Tube QPM     [Held by provider] predniSONE  5 mg Oral or Feeding Tube QAM     sulfamethoxazole-trimethoprim  295 mg Oral or Feeding Tube BID     tacrolimus  0.5 mg Oral or Feeding Tube QPM     tacrolimus  1 mg Oral or Feeding Tube QAM     valGANciclovir  450 mg Oral Once per day on Mon Thu     vancomycin place jordan - receiving intermittent dosing  1 each Does not apply See Admin Instructions       dextrose       heparin (porcine)       norepinephrine 0.1 mcg/kg/min (01/26/21 1200)     - MEDICATION INSTRUCTIONS -

## 2021-01-26 NOTE — PROGRESS NOTES
CLINICAL NUTRITION SERVICES - BRIEF NOTE      Nutrition Prescription     RECOMMENDATIONS FOR MDs/PROVIDERS TO ORDER:  --Recommend starting enteral nutrition via feeding tube even if position is gastric, as pt's diet has advanced.        --Per [facility] Critical Care Guidelines, there is no evidence that post-pyloric feeding tubes decrease or prevent risk of aspiration.   --Recommend continuing feeding tube and enteral nutrition until diet is advanced further and pt is able to consistently consume at least 1000 kcal and 55 g pro daily (~60% of estimated needs).     Malnutrition:   Severe malnutrition in the context of acute on chronic illness     Recommendations already ordered by Registered Dietitian (RD):  --Pending AXR confirmation of feeding tube position  --GOAL: Novasource Renal @ 35 ml/hr (840 ml) provides 1680 kcal (28 kcal/kg), 76 g pro (1.3 g/kg), 154 g CHO, 602 ml free water, and 0 g fiber daily.   --Start TF @ 15 ml/hr and advance by 10 ml q 8 hrs until goal rate.  --Do not start or advance TF rate unless K+ >3.0, Mg++ > 1.5,  and Phos > 1.9.  --Minimum 30 ml q 4 hrs water flushes for tube patency.  --If gastric enteral access: HOB > 30 degrees  --Nephronex    --Boost Shakes BID between meals (chocolate).      Future/Additional Recommendations:  --TF tolerance/advancement.  --Diet advancement as per SLP.     *Please see full assessment note from 1/24/2021    New Findings:  Discussed with MICU team this morning, pulm transplant requesting post-pyloric feeding tube placement. Pt was extubated this morning. Please see procedure notes, FT presumed gastric. SLP advanced diet to full liquids (evaluating swallow during second attempt at ppFT placement).     Pt and son report poor PO intake x at least 6 days. Pt likes chocolate Boost Shakes.     MALNUTRITION  % Intake: </= 50% for >/= 5 days (severe)  % Weight Loss: None noted  Subcutaneous Fat Loss: Facial region:  moderate  Muscle Loss: Temporal:  moderate, Facial  & jaw region:  moderate and Thoracic region (clavicle, acromium bone, deltoid, trapezius, pectoral):  moderate  Fluid Accumulation/Edema: None noted  Malnutrition Diagnosis: Severe malnutrition in the context of acute on chronic illness    Interventions  Collaboration with other providers - rounds, RN, SLP  Enteral Nutrition - Initiate if okay per primary team  Medical food supplement therapy    RD to follow per protocol.    Keri Gomez MS, RD, LD, [facility] pager: [phone]  ASCOM: [id number]

## 2021-01-26 NOTE — PROGRESS NOTES
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  She who was admitted to OSH on  for acute hypoxemic respiratory failure and new lung opacities. Intubated  and transferred to [facility] for ongoing management.  Poor tolerance of PST x2 on , but significantly improved now this morning.    Today's recommendations:  - Continue to follow cultures, ABX per MICU  - Agree with plan for extubation with successful PST this am per Dr. Roberson  - Stress dose steroids per MICU, consider insulin gtt for management of hyperglycemia  - Recommend post pyloric feeding tube placement today (IR consult if RD not successful) if pt. does not pass post-extubation swallow study, trickle feeds via OG  - Follow pending pleural cultures  - Posaconazole level today therapeutic, QTc markedly elevated at 594, recommend to repeat later today, LFTs ordered (stable)   - CMV and DSA pending  - VGCV for CMV ppx with stress dose steroids (ordered  - Next tacro level   - EBV pending     Acute hypoxic respiratory failure:   Right post-obstructive Stenotrophomonas pneumonia:  Septic shock: Presented to OSH ED with increased SOB and hypoxia, initially requiring 4L NC but continued to decline and subsequently intubated .  Hemodynamic instability after intubation requiring pressors, transferred to [facility].  Afebrile but with leukocytosis.  COVID and respiratory panel negative.  PTA bronch (20) with moderate airway obstruction and  RML/RLL mucous plugging, cultures with Stenotrophomonas and Eikenella (IV ceftazidime 1/13-1/19).  OSH CT chest with new multifocal GGO bilaterally and increased left loculated pleural effusion.  Repeat bronch (1/24) with RUL BAL with thick copious secretions to RML/RLL.  Etiology likely severe post-obstructive pneumonia, possibly complicated by increased Aspergillus empyema.  CXR (1/25) with stable opacities and small bilateral pleural effusions.  Failed PST x2 on 1/25 with low TV and tachycardia.  Improved PST this morning on 7/5 30%, tolerating well.    - Blood cultures and fungal blood cultures (1/24) NGTD  - BAL cultures (1/24) NGTD  - BAL PJP (1/24) pending  - ABX per MICU: vancomycin, Zosyn, and Bactrim based on recent cultures  - Ventilator management per MICU, agree with plan for extubation with successful PST this am per Dr. Roberson  - Pulmonary toilet with chest physiotherapy QID and nebs: Duoneb QID and Mucomyst QID  - Stress dose steroids (1/24) per MICU, consider insulin gtt for management of hyperglycemia  - Recommend post pyloric feeding tube placement today (IR consult if RD not successful) if pt. does not pass post-extubation swallow study, trickle feeds via OG     Aspergillus empyema (left-sided): First noted 10/8/19.  CT scan on 7/17/20 with increased mass-like density, likely pleural-based, in LLL area s/p needle aspiration (8/13/20) with Aspergillus fumigatus on cultures s/p intrapleural bead placement (amphoterecin, 11/20).  Following with ID as OP.  LFTs stable on admission (ALP chronically mildly elevated).  CT chest with increased loculated left pleural effusion s/p thoracentesis (1/25), exudative with 87% neutrophils, very high LDL (6308), cytology normal.  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 Recent CMV (12/9) and DSA (12/23) not detected.  Continued right mainstem stenosis noted with PTA bronch on 12/23/20, mild on 1/24 bronch.  - DSA (1/25) pending      Immunosuppression: On 2 drug IST d/t recurrent infections  - Tacrolimus 1 mg qAM / 0.5 mg qPM.  Goal level 8-10.  Next level 1/28 (ordered).  - Holding chronic prednisone while on stress dose steroids (PTA 5mg qAM and 2.5mg qPM)     Prophylaxis:   - Bactrim treatment dose as above. PTA was not on PJP ppx since 7/28/20 as CD4>200.     H/o CMV viremia: CMV D+/R+  - CMV (1/25) pending  - VGCV for CMV ppx with stress dose steroids (ordered)      EBV viremia: Last level 12/9/20 mildly elevated to 10K (log 4) from prior 3K (3.5 log) on 9/9/20.  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 Chronic iHD M/Th via AVF with partial graft.   - Monitoring of tacrolimus as above  - Dialysis management per nephrology     We appreciate the excellent care provided by the MICU team. Recommendations communicated via in person rounding and this note. Will continue to follow along closely, please do not hesitate to call with any questions or concerns.     Patient discussed with Dr. Da Huff, DNP, APRN, CNP  Inpatient Nurse Practitioner  Pulmonary CF/Transplant     Subjective & Interval History:     Remains intubated, PST x2 yesterday poorly tolerated with tachycardia and low TV.  CPT started, received 2 therapies yesterday.  Minimal secretions via ETT.  Fentanyl and Versed for sedation.  Bedside thoracentesis yesterday with exudative fluid, very high LDH but normal cytology.  Weaned off vasopressin overnight, remains on norepinephrine.  Now this morning with PST again, good TV and HR, denies SOB and tolerating well.  Trickle TF via OG, NJ not placed yesterday.    Review of Systems:     C: No fever, no chills, no change in weight  INTEGUMENTARY/SKIN: No rash or  "obvious new lesions  ENT/MOUTH: No nasal congestion or drainage  RESP: See interval history  CV: No chest pain, + occ palpitations, no peripheral edema  GI: No nausea, no vomiting, no change in stools  : Oliguria  MUSCULOSKELETAL: No myalgias, no arthralgias  ENDOCRINE: Blood sugars increasing and persist >200  NEURO: No headache, no numbness or tingling  PSYCHIATRIC: Mood stable    Physical Exam:     Vital signs:  Temp: 97.1  F (36.2  C) Temp src: Axillary BP: (!) 87/72 Pulse: 65   Resp: 24 SpO2: 100 % O2 Device: Mechanical Ventilator   Height: 160 cm (5' 2.99\") Weight: 60.9 kg (134 lb 4.2 oz)  I/O:     Intake/Output Summary (Last 24 hours) at 1/26/2021 0736  Last data filed at 1/26/2021 0700  Gross per 24 hour   Intake 1792.04 ml   Output 210 ml   Net 1582.04 ml     Constitutional: Sitting up in bed, intubated, in no apparent distress.   HEENT: Eyes with pink conjunctivae, anicteric. Orally intubated.  PULM: Good air flow bilaterally. Bibasilar crackles, no rhonchi, no wheezes. Non-labored breathing on PST 7/5 30%.  CV: Normal S1 and S2. RRR. No murmur, gallop, or rub. No peripheral edema.   ABD: NABS, soft, nontender, nondistended.    MSK: Moves all extremities. No apparent muscle wasting.   NEURO: Alert, nod/shakes head in answer to questions.   SKIN: Warm, dry. No rash on limited exam.   PSYCH: Mood stable.     Lines, Drains, and Devices:  CVC Double Lumen 10/25/19 Right Internal jugular (Active)   Number of days: 459       Hemodialysis Vascular Access Arteriovenous fistula Left Arm (Active)   Site Assessment WDL;Bruit present;Thrill present 01/26/21 0400   Number of days: 1       CVC TRIPLE LUMEN Right Internal jugular (Active)   Site Assessment WDL 01/26/21 0400   Dressing Type Transparent;Chlorhexidine sponge 01/26/21 0400   Dressing Status clean;dry;intact 01/26/21 0400   Dressing Intervention dressing changed 01/25/21 1800   Dressing Change Due 01/31/21 01/26/21 0400   Line Necessity yes, meets criteria " 01/26/21 0400   Blue - Status infusing 01/26/21 0400   Blue - Cap Change Due 01/29/21 01/26/21 0400   Brown - Status infusing 01/26/21 0400   Brown - Cap Change Due 01/29/21 01/26/21 0400   White - Status infusing 01/26/21 0400   White - Cap Change Due 01/29/21 01/26/21 0400   Phlebitis Scale 0-->no symptoms 01/26/21 0400   Infiltration? no 01/26/21 0400   Infiltration Scale 0 01/26/21 0400   Infiltration Site Treatment Method  None 01/25/21 0000   CVC Comment CDI 01/26/21 0400   Number of days: 2     Data:     LABS    CMP:   Recent Labs   Lab 01/26/21  0425 01/25/21  0406 01/24/21  1653 01/24/21  1458    135  --  134   POTASSIUM 4.0 4.1 3.8 3.7   CHLORIDE 100 101  --  98   CO2 19* 16*  --  16*   ANIONGAP 15* 17*  --  20*   * 187*  --  155*   BUN 72* 58*  --  48*   CR 4.45* 3.73*  --  3.39*   GFRESTIMATED 10* 13*  --  14*   GFRESTBLACK 12* 15*  --  16*   CHELSEY 8.0* 8.1*  --  8.2*   MAG 2.8* 2.5* 2.5* 2.4*   PHOS 7.0* 6.8* 6.5* 6.5*   PROTTOTAL 6.0*  --   --  6.1*   ALBUMIN  --   --   --  2.0*   BILITOTAL  --   --   --  1.2   ALKPHOS  --   --   --  244*   AST  --   --   --  31   ALT  --   --   --  35     CBC:   Recent Labs   Lab 01/26/21  0425 01/25/21  0406 01/24/21  1458   WBC 16.9* 12.5* 20.5*   RBC 3.45* 3.59* 3.63*   HGB 10.5* 10.9* 11.1*   HCT 29.7* 33.4* 34.6*   MCV 86 93 95   MCH 30.4 30.4 30.6   MCHC 35.4 32.6 32.1   RDW 13.7 14.4 14.7    167 192       INR: No lab results found in last 7 days.    Glucose:   Recent Labs   Lab 01/26/21  0425 01/26/21  0423 01/25/21  2354 01/25/21  2101 01/25/21  1747 01/25/21  1253 01/25/21  0820 01/25/21  0406 01/25/21  0406 01/24/21  1458 01/24/21  1458   *  --   --   --   --   --   --   --  187*  --  155*   BGM  --  264* 245* 242* 202* 171* 160*   < >  --    < > 150*    < > = values in this interval not displayed.       Blood Gas:   Recent Labs   Lab 01/26/21 0425 01/25/21  0406 01/24/21  1529   PHV 7.42 7.37  --    PCO2V 33* 31*  --    PO2V 49*  54*  --    HCO3V 21 17*  --    O2PER 45.0 50.0 100.0       Culture Data   Recent Labs   Lab 01/25/21  1347 01/24/21  1841 01/24/21  1830 01/24/21  1729 01/24/21  1652 01/24/21  1629   CULT PENDING No growth after 2 days No growth after 16 hours No growth No growth after 2 days Culture negative monitoring continues  No growth after 16 hours  Culture negative after 16 hours  Culture negative monitoring continues       Virology Data:   Lab Results   Component Value Date    FLUAH1 Not Detected 01/24/2021    FLUAH3 Not Detected 01/24/2021    HY8065 Not Detected 01/24/2021    IFLUB Not Detected 01/24/2021    RSVA Not Detected 01/24/2021    RSVB Not Detected 01/24/2021    PIV1 Not Detected 01/24/2021    PIV2 Not Detected 01/24/2021    PIV3 Not Detected 01/24/2021    HMPV Not Detected 01/24/2021    HRVS Negative 01/24/2021    ADVBE Negative 01/24/2021    ADVC Negative 01/24/2021    ADVC Negative 12/23/2020    ADVC Negative 10/07/2019       Historical CMV results (last 3 of prior testing):  Lab Results   Component Value Date    CMVQNT <137 (A) 01/25/2021    CMVQNT 238 (A) 01/24/2021    CMVQNT 675 (A) 12/23/2020     Lab Results   Component Value Date    CMVLOG <2.1 01/25/2021    CMVLOG 2.4 (H) 01/24/2021    CMVLOG 2.8 (H) 12/23/2020       Urine Studies    Recent Labs   Lab Test 01/24/21  1729 10/21/19  2240   URINEPH 5.0 5.0   NITRITE Negative Negative   LEUKEST Moderate* Large*   WBCU 34* 115*       Most Recent [place]ze Pulmonary Function Testing (FVC/FEV1 only)  FVC-Pre   Date Value Ref Range Status   12/09/2020 1.12 L    09/09/2020 1.56 L    03/09/2020 1.57 L    02/19/2020 1.78 L      FVC-%Pred-Pre   Date Value Ref Range Status   12/09/2020 34 %    09/09/2020 47 %    03/09/2020 48 %    02/19/2020 54 %      FEV1-Pre   Date Value Ref Range Status   12/09/2020 0.98 L    09/09/2020 1.10 L    03/09/2020 0.96 L    02/19/2020 0.99 L      FEV1-%Pred-Pre   Date Value Ref Range Status   12/09/2020 37 %    09/09/2020 42 %     03/09/2020 37 %    02/19/2020 38 %        IMAGING    Recent Results (from the past 48 hour(s))   XR Chest Port 1 View    Narrative    EXAM: XR CHEST PORT 1 VW  1/24/2021 3:08 PM     HISTORY:  s/p b/l lung transplant w/new HRF, also confirm line/tube  placement       COMPARISON:  Chest x-ray 12/9/2020    FINDINGS:   Portable supine view of the chest. Post surgical changes of bilateral  lung transplant with mediastinal surgical clips and intact medial  sternotomy wires. Endotracheal tube tip projects over the low thoracic  trachea. Right internal jugular central venous catheter tip projects  at the superior cavoatrial junction. Enteric tube tip and sidehole  projected within the stomach.    The trachea is midline. Cardiac silhouette is within normal limits.  There patchy airspace opacities bilaterally. Small right pleural  effusion with overlying basilar opacities. No pneumothorax.      Impression    IMPRESSION:   1. Endotracheal tube tip projects over the low thoracic trachea.  2. Right internal jugular central venous catheter tip projects over  the superior cavoatrial junction.  2. Enteric tube tip and sidehole projected within the stomach.  4. Patchy airspace opacities bilaterally, suggestive of edema versus  infection.  5. Small right pleural effusion.    I have personally reviewed the examination and initial interpretation  and I agree with the findings.    MEHNAZ CHAPMAN MD   XR Chest Port 1 View    Narrative    Portable chest    INDICATION: Acute hypoxemic respiratory failure    COMPARISON: 1/24/2021    FINDINGS: Heart size remains normal. Right IJ catheter tip appears in  the region of the SVC/right atrial junction. There is some coiling at  the superior aspect of the tubing, this could be outside of the  patient, please correlate clinically to ensure that this is not coiled  within a vascular structure. No pneumothorax. Median sternotomy again  present. Opacities in the lungs appears similar along with  blunting of  right costophrenic angle.  Endotracheal tube tip there is approximately 2.3 cm above the gee.  Surgical clips in the right axillary region are present. NG/OG tube  tip and sidehole projected within the stomach.      Impression    IMPRESSION: Continued opacification in the lungs which may represent  infection or edema. No significant change. Unchanged small right  pleural effusion.    TERRI ISSA MD   Echo Complete    Narrative    [id number]  [id number]  [id number]  [id number]^ISABELLA^CHRISTINE           [facility],[place]  Echocardiography Laboratory  [address] [place] [id number]     Name: SARAI KILLIAN  MRN: [id number]  : 1960  Study Date: 2021 01:55 PM  Age: 60 yrs  Gender: Female  Patient Location: [facility]  Reason For Study: Shock  Ordering Physician: CHRISTINE MASON  Performed By: Maya Chin RDCS     BSA: 1.6 m2  Height: 62 in  Weight: 129 lb  HR: 74  BP: 79/52 mmHg  _____________________________________________________________________________  __        Procedure  Complete Portable Echo Adult.  _____________________________________________________________________________  __        Interpretation Summary  Global and regional left ventricular function is normal with an EF of 60-65%.  Right ventricular function, chamber size, wall motion, and thickness are  normal.  IVC diameter >2.1 cm collapsing <50% with sniff suggests a high RA pressure  estimated at 15 mmHg or greater.  _____________________________________________________________________________  __        Left Ventricle  Global and regional left ventricular function is normal with an EF of 60-65%.  Left ventricular wall thickness is normal. Left ventricular size is normal.  Left ventricular diastolic function is normal. No regional wall motion  abnormalities are seen.     Right Ventricle  Right ventricular function, chamber size, wall motion, and thickness are  normal.     Atria  The  right atria appears normal. Moderate left atrial enlargement is present.     Mitral Valve  The mitral valve is normal. Trace mitral insufficiency is present.        Aortic Valve  The valve leaflets are not well visualized. On Doppler interrogation, there is  no significant stenosis or regurgitation.     Tricuspid Valve  Mild to moderate tricuspid insufficiency is present. The right ventricular  systolic pressure is approximated at 24.6 mmHg plus the right atrial pressure.     Pulmonic Valve  The pulmonic valve is normal.     Vessels  The aorta root is normal. The pulmonary artery and bifurcation cannot be  assessed. IVC diameter >2.1 cm collapsing <50% with sniff suggests a high RA  pressure estimated at 15 mmHg or greater.     Pericardium  No pericardial effusion is present.     _____________________________________________________________________________  __  MMode/2D Measurements & Calculations  IVSd: 0.86 cm  LVIDd: 4.9 cm  LVIDs: 3.0 cm  LVPWd: 0.86 cm  FS: 39.4 %     LV mass(C)d: 146.0 grams  LV mass(C)dI: 92.0 grams/m2  asc Aorta Diam: 2.4 cm  LVOT diam: 2.1 cm  LVOT area: 3.4 cm2  LA Volume Index (BP): 45.3 ml/m2  RWT: 0.35  TAPSE: 1.9 cm        Doppler Measurements & Calculations  MV E max herberth: 84.5 cm/sec  MV A max herberth: 62.5 cm/sec  MV E/A: 1.4  MV dec slope: 502.5 cm/sec2  MV dec time: 0.17 sec     PA acc time: 0.10 sec  TR max herberth: 248.0 cm/sec  TR max P.6 mmHg  E/E' avg: 10.1  Lateral E/e': 9.3  Medial E/e': 10.9     _____________________________________________________________________________  __           Report approved by: Glen Zhang 2021 02:40 PM      XR Chest Port 1 View    Narrative    EXAM: XR CHEST PORT 1 VW  2021 6:41 PM     HISTORY:  post left thoracentesis       COMPARISON:  Chest x-ray from same date at 0812 hours    FINDINGS:   Portable upright view of the chest. Endotracheal tube tip projects  over the low thoracic trachea. Right internal jugular central  venous  catheter tip projects over the distal SVC, with coiling of the  proximal tubing, unchanged from prior. Scattered mediastinal surgical  clips. Trachea is midline. Cardiac silhouette is stable. No  significant change in the diffuse mixed interstitial and airspace  opacities bilaterally. Small bilateral pleural effusions. No  pneumothorax. Intact median sternotomy wires.      Impression    IMPRESSION:    1. No significant change in the diffuse mixed interstitial and  airspace opacities.  2. Small bilateral pleural effusions.  3. Unchanged coiling of the proximal right internal jugular central  venous catheter. Recommend correlation to ensure that the coiling is  external to the patient.    I have personally reviewed the examination and initial interpretation  and I agree with the findings.    TALI CARRANZA MD

## 2021-01-26 NOTE — PLAN OF CARE
OT/4C: Patient pressure supporting and working toward extubation upon check-in this AM; RN recommending checking back. Working with PT then SLP this PM. Will reschedule OT for tomorrow.

## 2021-01-26 NOTE — PROCEDURES
A time-out was completed verifying correct patient, procedure, site, positioning, and special equipment if applicable. The patient s left side was prepped and draped in a sterile manner after the appropriate infiltration level was confirmed by ultrasound. 1% lidocaine was used anesthetize the surrounding skin. Ultrasound was used to zachary an area of fluid collection. A finder needle was then used to locate fluid and 50 ml of cloudy yellow fluid was obtained.     Luna Jean MD  Pulmonary, Critical Care and Sleep Medicine  [facility]  Pager: [phone]

## 2021-01-27 ENCOUNTER — APPOINTMENT (OUTPATIENT)
Dept: GENERAL RADIOLOGY | Facility: CLINIC | Age: 61
End: 2021-01-27
Attending: INTERNAL MEDICINE
Payer: COMMERCIAL

## 2021-01-27 ENCOUNTER — APPOINTMENT (OUTPATIENT)
Dept: OCCUPATIONAL THERAPY | Facility: CLINIC | Age: 61
End: 2021-01-27
Attending: INTERNAL MEDICINE
Payer: COMMERCIAL

## 2021-01-27 ENCOUNTER — ANESTHESIA EVENT (OUTPATIENT)
Dept: INTENSIVE CARE | Facility: CLINIC | Age: 61
End: 2021-01-27
Payer: COMMERCIAL

## 2021-01-27 ENCOUNTER — APPOINTMENT (OUTPATIENT)
Dept: CT IMAGING | Facility: CLINIC | Age: 61
End: 2021-01-27
Attending: INTERNAL MEDICINE
Payer: COMMERCIAL

## 2021-01-27 ENCOUNTER — ANESTHESIA (OUTPATIENT)
Dept: INTENSIVE CARE | Facility: CLINIC | Age: 61
End: 2021-01-27
Payer: COMMERCIAL

## 2021-01-27 LAB
ACID FAST STN SPEC QL: NORMAL
ACID FAST STN SPEC QL: NORMAL
ALBUMIN SERPL-MCNC: 2.2 G/DL (ref 3.4–5)
ALP SERPL-CCNC: 171 U/L (ref 40–150)
ALT SERPL W P-5'-P-CCNC: 30 U/L (ref 0–50)
ANION GAP SERPL CALCULATED.3IONS-SCNC: 11 MMOL/L (ref 3–14)
AST SERPL W P-5'-P-CCNC: 15 U/L (ref 0–45)
BASE DEFICIT BLDA-SCNC: 0.1 MMOL/L
BASE DEFICIT BLDA-SCNC: 0.4 MMOL/L
BASE DEFICIT BLDA-SCNC: 0.8 MMOL/L
BILIRUB SERPL-MCNC: 0.8 MG/DL (ref 0.2–1.3)
BUN SERPL-MCNC: 44 MG/DL (ref 7–30)
CALCIUM SERPL-MCNC: 8.4 MG/DL (ref 8.5–10.1)
CHLORIDE SERPL-SCNC: 101 MMOL/L (ref 94–109)
CO2 SERPL-SCNC: 23 MMOL/L (ref 20–32)
COPATH REPORT: NORMAL
CREAT SERPL-MCNC: 3.18 MG/DL (ref 0.52–1.04)
DONOR IDENTIFICATION: NORMAL
DSA COMMENTS: NORMAL
DSA PRESENT: NO
DSA TEST METHOD: NORMAL
EBV DNA # SPEC NAA+PROBE: 5619 {COPIES}/ML
EBV DNA SPEC NAA+PROBE-LOG#: 3.8 {LOG_COPIES}/ML
ERYTHROCYTE [DISTWIDTH] IN BLOOD BY AUTOMATED COUNT: 14.5 % (ref 10–15)
GFR SERPL CREATININE-BSD FRML MDRD: 15 ML/MIN/{1.73_M2}
GLUCOSE BLDC GLUCOMTR-MCNC: 117 MG/DL (ref 70–99)
GLUCOSE BLDC GLUCOMTR-MCNC: 119 MG/DL (ref 70–99)
GLUCOSE BLDC GLUCOMTR-MCNC: 127 MG/DL (ref 70–99)
GLUCOSE BLDC GLUCOMTR-MCNC: 135 MG/DL (ref 70–99)
GLUCOSE BLDC GLUCOMTR-MCNC: 160 MG/DL (ref 70–99)
GLUCOSE BLDC GLUCOMTR-MCNC: 162 MG/DL (ref 70–99)
GLUCOSE SERPL-MCNC: 172 MG/DL (ref 70–99)
HCO3 BLD-SCNC: 24 MMOL/L (ref 21–28)
HCO3 BLD-SCNC: 24 MMOL/L (ref 21–28)
HCO3 BLD-SCNC: 25 MMOL/L (ref 21–28)
HCT VFR BLD AUTO: 32.1 % (ref 35–47)
HGB BLD-MCNC: 10.8 G/DL (ref 11.7–15.7)
INTERPRETATION ECG - MUSE: NORMAL
LAB SCANNED RESULT: NORMAL
MAGNESIUM SERPL-MCNC: 2.2 MG/DL (ref 1.6–2.3)
MCH RBC QN AUTO: 30.3 PG (ref 26.5–33)
MCHC RBC AUTO-ENTMCNC: 33.6 G/DL (ref 31.5–36.5)
MCV RBC AUTO: 90 FL (ref 78–100)
NT-PROBNP SERPL-MCNC: ABNORMAL PG/ML (ref 0–900)
O2/TOTAL GAS SETTING VFR VENT: 50 %
O2/TOTAL GAS SETTING VFR VENT: 80 %
O2/TOTAL GAS SETTING VFR VENT: ABNORMAL %
ORGAN: NORMAL
PCO2 BLD: 38 MM HG (ref 35–45)
PCO2 BLD: 40 MM HG (ref 35–45)
PCO2 BLD: 41 MM HG (ref 35–45)
PH BLD: 7.39 PH (ref 7.35–7.45)
PH BLD: 7.39 PH (ref 7.35–7.45)
PH BLD: 7.41 PH (ref 7.35–7.45)
PHOSPHATE SERPL-MCNC: 5.1 MG/DL (ref 2.5–4.5)
PLATELET # BLD AUTO: 231 10E9/L (ref 150–450)
PO2 BLD: 45 MM HG (ref 80–105)
PO2 BLD: 59 MM HG (ref 80–105)
PO2 BLD: 79 MM HG (ref 80–105)
POTASSIUM SERPL-SCNC: 3.8 MMOL/L (ref 3.4–5.3)
PROT SERPL-MCNC: 6 G/DL (ref 6.8–8.8)
RBC # BLD AUTO: 3.56 10E12/L (ref 3.8–5.2)
SA1 CELL: NORMAL
SA1 COMMENTS: NORMAL
SA1 HI RISK ABY: NORMAL
SA1 MOD RISK ABY: NORMAL
SA1 TEST METHOD: NORMAL
SA2 CELL: NORMAL
SA2 COMMENTS: NORMAL
SA2 HI RISK ABY UA: NORMAL
SA2 MOD RISK ABY: NORMAL
SA2 TEST METHOD: NORMAL
SODIUM SERPL-SCNC: 135 MMOL/L (ref 133–144)
SPECIMEN SOURCE: NORMAL
UNACCEPTABLE ANTIGEN: NORMAL
UNOS CPRA: 0
WBC # BLD AUTO: 15.2 10E9/L (ref 4–11)

## 2021-01-27 PROCEDURE — 250N000013 HC RX MED GY IP 250 OP 250 PS 637: Performed by: STUDENT IN AN ORGANIZED HEALTH CARE EDUCATION/TRAINING PROGRAM

## 2021-01-27 PROCEDURE — 999N000065 XR CHEST PORT 1 VW

## 2021-01-27 PROCEDURE — 250N000011 HC RX IP 250 OP 636: Performed by: STUDENT IN AN ORGANIZED HEALTH CARE EDUCATION/TRAINING PROGRAM

## 2021-01-27 PROCEDURE — 84100 ASSAY OF PHOSPHORUS: CPT | Performed by: STUDENT IN AN ORGANIZED HEALTH CARE EDUCATION/TRAINING PROGRAM

## 2021-01-27 PROCEDURE — 36600 WITHDRAWAL OF ARTERIAL BLOOD: CPT

## 2021-01-27 PROCEDURE — 999N000157 HC STATISTIC RCP TIME EA 10 MIN

## 2021-01-27 PROCEDURE — 97530 THERAPEUTIC ACTIVITIES: CPT | Mod: GO

## 2021-01-27 PROCEDURE — 94640 AIRWAY INHALATION TREATMENT: CPT

## 2021-01-27 PROCEDURE — 250N000009 HC RX 250: Performed by: NURSE ANESTHETIST, CERTIFIED REGISTERED

## 2021-01-27 PROCEDURE — 85027 COMPLETE CBC AUTOMATED: CPT | Performed by: STUDENT IN AN ORGANIZED HEALTH CARE EDUCATION/TRAINING PROGRAM

## 2021-01-27 PROCEDURE — 250N000011 HC RX IP 250 OP 636: Performed by: NURSE PRACTITIONER

## 2021-01-27 PROCEDURE — 71045 X-RAY EXAM CHEST 1 VIEW: CPT

## 2021-01-27 PROCEDURE — 94640 AIRWAY INHALATION TREATMENT: CPT | Mod: 76

## 2021-01-27 PROCEDURE — 999N001017 HC STATISTIC GLUCOSE BY METER IP

## 2021-01-27 PROCEDURE — 71045 X-RAY EXAM CHEST 1 VIEW: CPT | Mod: 26 | Performed by: RADIOLOGY

## 2021-01-27 PROCEDURE — 94002 VENT MGMT INPAT INIT DAY: CPT

## 2021-01-27 PROCEDURE — 93010 ELECTROCARDIOGRAM REPORT: CPT | Performed by: INTERNAL MEDICINE

## 2021-01-27 PROCEDURE — 74340 X-RAY GUIDE FOR GI TUBE: CPT | Mod: 26 | Performed by: RADIOLOGY

## 2021-01-27 PROCEDURE — 250N000009 HC RX 250: Performed by: INTERNAL MEDICINE

## 2021-01-27 PROCEDURE — 80053 COMPREHEN METABOLIC PANEL: CPT | Performed by: STUDENT IN AN ORGANIZED HEALTH CARE EDUCATION/TRAINING PROGRAM

## 2021-01-27 PROCEDURE — 82803 BLOOD GASES ANY COMBINATION: CPT | Performed by: INTERNAL MEDICINE

## 2021-01-27 PROCEDURE — 44500 INTRO GASTROINTESTINAL TUBE: CPT | Mod: GC | Performed by: RADIOLOGY

## 2021-01-27 PROCEDURE — 250N000009 HC RX 250: Performed by: NURSE PRACTITIONER

## 2021-01-27 PROCEDURE — 999N000011 HC STATISTIC ANESTHESIA CASE

## 2021-01-27 PROCEDURE — 250N000012 HC RX MED GY IP 250 OP 636 PS 637: Performed by: NURSE PRACTITIONER

## 2021-01-27 PROCEDURE — 250N000011 HC RX IP 250 OP 636: Performed by: NURSE ANESTHETIST, CERTIFIED REGISTERED

## 2021-01-27 PROCEDURE — 999N000185 HC STATISTIC TRANSPORT TIME EA 15 MIN

## 2021-01-27 PROCEDURE — 74340 X-RAY GUIDE FOR GI TUBE: CPT

## 2021-01-27 PROCEDURE — 83735 ASSAY OF MAGNESIUM: CPT | Performed by: STUDENT IN AN ORGANIZED HEALTH CARE EDUCATION/TRAINING PROGRAM

## 2021-01-27 PROCEDURE — 272N000079 HC NUTRITION PRODUCT RENAL BASIC LITER

## 2021-01-27 PROCEDURE — 82803 BLOOD GASES ANY COMBINATION: CPT | Performed by: STUDENT IN AN ORGANIZED HEALTH CARE EDUCATION/TRAINING PROGRAM

## 2021-01-27 PROCEDURE — 99291 CRITICAL CARE FIRST HOUR: CPT | Mod: GC | Performed by: INTERNAL MEDICINE

## 2021-01-27 PROCEDURE — 250N000013 HC RX MED GY IP 250 OP 250 PS 637: Performed by: NURSE PRACTITIONER

## 2021-01-27 PROCEDURE — 94003 VENT MGMT INPAT SUBQ DAY: CPT

## 2021-01-27 PROCEDURE — C1769 GUIDE WIRE: HCPCS | Mod: 52

## 2021-01-27 PROCEDURE — 250N000012 HC RX MED GY IP 250 OP 636 PS 637: Performed by: INTERNAL MEDICINE

## 2021-01-27 PROCEDURE — 71250 CT THORAX DX C-: CPT

## 2021-01-27 PROCEDURE — 200N000002 HC R&B ICU UMMC

## 2021-01-27 PROCEDURE — 99233 SBSQ HOSP IP/OBS HIGH 50: CPT | Performed by: INTERNAL MEDICINE

## 2021-01-27 PROCEDURE — 258N000003 HC RX IP 258 OP 636: Performed by: NURSE PRACTITIONER

## 2021-01-27 PROCEDURE — 93005 ELECTROCARDIOGRAM TRACING: CPT

## 2021-01-27 PROCEDURE — 71250 CT THORAX DX C-: CPT | Mod: 26 | Performed by: RADIOLOGY

## 2021-01-27 PROCEDURE — 83880 ASSAY OF NATRIURETIC PEPTIDE: CPT | Performed by: INTERNAL MEDICINE

## 2021-01-27 RX ORDER — PROPOFOL 10 MG/ML
INJECTION, EMULSION INTRAVENOUS PRN
Status: DISCONTINUED | OUTPATIENT
Start: 2021-01-27 | End: 2021-01-27

## 2021-01-27 RX ORDER — MIDAZOLAM (PF) 1 MG/ML IN 0.9 % SODIUM CHLORIDE INTRAVENOUS SOLUTION
1-8 CONTINUOUS
Status: DISCONTINUED | OUTPATIENT
Start: 2021-01-27 | End: 2021-01-27

## 2021-01-27 RX ORDER — LIDOCAINE HYDROCHLORIDE 20 MG/ML
5 SOLUTION OROPHARYNGEAL
Status: DISCONTINUED | OUTPATIENT
Start: 2021-01-27 | End: 2021-02-25 | Stop reason: HOSPADM

## 2021-01-27 RX ORDER — FENTANYL CITRATE 50 UG/ML
50 INJECTION, SOLUTION INTRAMUSCULAR; INTRAVENOUS
Status: DISCONTINUED | OUTPATIENT
Start: 2021-01-27 | End: 2021-01-29

## 2021-01-27 RX ORDER — AMOXICILLIN 250 MG
2 CAPSULE ORAL 2 TIMES DAILY
Status: DISCONTINUED | OUTPATIENT
Start: 2021-01-27 | End: 2021-01-30

## 2021-01-27 RX ORDER — LIDOCAINE HYDROCHLORIDE 20 MG/ML
15 SOLUTION OROPHARYNGEAL ONCE
Status: COMPLETED | OUTPATIENT
Start: 2021-01-27 | End: 2021-01-27

## 2021-01-27 RX ORDER — HALOPERIDOL 5 MG/ML
2 INJECTION INTRAMUSCULAR EVERY 4 HOURS PRN
Status: DISCONTINUED | OUTPATIENT
Start: 2021-01-27 | End: 2021-01-29

## 2021-01-27 RX ADMIN — BISACODYL 10 MG: 10 SUPPOSITORY RECTAL at 14:32

## 2021-01-27 RX ADMIN — SODIUM CHLORIDE 300 MG: 9 INJECTION, SOLUTION INTRAVENOUS at 07:47

## 2021-01-27 RX ADMIN — BUDESONIDE 0.5 MG: 0.5 INHALANT ORAL at 21:15

## 2021-01-27 RX ADMIN — ROCURONIUM BROMIDE 100 MG: 10 INJECTION INTRAVENOUS at 03:39

## 2021-01-27 RX ADMIN — BUDESONIDE 0.5 MG: 0.5 INHALANT ORAL at 08:16

## 2021-01-27 RX ADMIN — SULFAMETHOXAZOLE AND TRIMETHOPRIM 295 MG: 200; 40 SUSPENSION ORAL at 20:35

## 2021-01-27 RX ADMIN — ACETYLCYSTEINE 2 ML: 200 SOLUTION ORAL; RESPIRATORY (INHALATION) at 16:19

## 2021-01-27 RX ADMIN — HYDROCORTISONE SODIUM SUCCINATE 50 MG: 100 INJECTION, POWDER, FOR SOLUTION INTRAMUSCULAR; INTRAVENOUS at 17:50

## 2021-01-27 RX ADMIN — PIPERACILLIN AND TAZOBACTAM 2.25 G: 2; .25 INJECTION, POWDER, FOR SOLUTION INTRAVENOUS at 00:05

## 2021-01-27 RX ADMIN — PROPOFOL 100 MG: 10 INJECTION, EMULSION INTRAVENOUS at 03:38

## 2021-01-27 RX ADMIN — ACETYLCYSTEINE 2 ML: 200 SOLUTION ORAL; RESPIRATORY (INHALATION) at 08:15

## 2021-01-27 RX ADMIN — HEPARIN SODIUM 5000 UNITS: 5000 INJECTION, SOLUTION INTRAVENOUS; SUBCUTANEOUS at 15:43

## 2021-01-27 RX ADMIN — LIDOCAINE HYDROCHLORIDE 15 ML: 20 SOLUTION ORAL; TOPICAL at 16:40

## 2021-01-27 RX ADMIN — IPRATROPIUM BROMIDE AND ALBUTEROL SULFATE 3 ML: .5; 3 SOLUTION RESPIRATORY (INHALATION) at 12:47

## 2021-01-27 RX ADMIN — HEPARIN SODIUM 5000 UNITS: 5000 INJECTION, SOLUTION INTRAVENOUS; SUBCUTANEOUS at 07:47

## 2021-01-27 RX ADMIN — IPRATROPIUM BROMIDE AND ALBUTEROL SULFATE 3 ML: .5; 3 SOLUTION RESPIRATORY (INHALATION) at 08:16

## 2021-01-27 RX ADMIN — POLYETHYLENE GLYCOL 3350 17 G: 17 POWDER, FOR SOLUTION ORAL at 07:47

## 2021-01-27 RX ADMIN — IPRATROPIUM BROMIDE AND ALBUTEROL SULFATE 3 ML: .5; 3 SOLUTION RESPIRATORY (INHALATION) at 21:15

## 2021-01-27 RX ADMIN — ACETYLCYSTEINE 2 ML: 200 SOLUTION ORAL; RESPIRATORY (INHALATION) at 12:47

## 2021-01-27 RX ADMIN — Medication 5 ML: at 07:50

## 2021-01-27 RX ADMIN — PIPERACILLIN AND TAZOBACTAM 2.25 G: 2; .25 INJECTION, POWDER, FOR SOLUTION INTRAVENOUS at 06:18

## 2021-01-27 RX ADMIN — MIDAZOLAM 2 MG: 1 INJECTION INTRAMUSCULAR; INTRAVENOUS at 04:06

## 2021-01-27 RX ADMIN — PIPERACILLIN AND TAZOBACTAM 2.25 G: 2; .25 INJECTION, POWDER, FOR SOLUTION INTRAVENOUS at 17:50

## 2021-01-27 RX ADMIN — TACROLIMUS 1 MG: 5 CAPSULE ORAL at 07:50

## 2021-01-27 RX ADMIN — TACROLIMUS 0.5 MG: 5 CAPSULE ORAL at 18:57

## 2021-01-27 RX ADMIN — HEPARIN SODIUM 5000 UNITS: 5000 INJECTION, SOLUTION INTRAVENOUS; SUBCUTANEOUS at 00:05

## 2021-01-27 RX ADMIN — INSULIN ASPART 1 UNITS: 100 INJECTION, SOLUTION INTRAVENOUS; SUBCUTANEOUS at 04:36

## 2021-01-27 RX ADMIN — IPRATROPIUM BROMIDE AND ALBUTEROL SULFATE 3 ML: .5; 3 SOLUTION RESPIRATORY (INHALATION) at 16:18

## 2021-01-27 RX ADMIN — ACETYLCYSTEINE 2 ML: 200 SOLUTION ORAL; RESPIRATORY (INHALATION) at 21:16

## 2021-01-27 RX ADMIN — DOCUSATE SODIUM AND SENNOSIDES 2 TABLET: 8.6; 5 TABLET, FILM COATED ORAL at 20:35

## 2021-01-27 RX ADMIN — INSULIN ASPART 1 UNITS: 100 INJECTION, SOLUTION INTRAVENOUS; SUBCUTANEOUS at 00:11

## 2021-01-27 RX ADMIN — Medication 2 MG/HR: at 04:01

## 2021-01-27 RX ADMIN — DOCUSATE SODIUM AND SENNOSIDES 2 TABLET: 8.6; 5 TABLET, FILM COATED ORAL at 08:49

## 2021-01-27 RX ADMIN — SULFAMETHOXAZOLE AND TRIMETHOPRIM 295 MG: 200; 40 SUSPENSION ORAL at 07:51

## 2021-01-27 RX ADMIN — HYDROCORTISONE SODIUM SUCCINATE 50 MG: 100 INJECTION, POWDER, FOR SOLUTION INTRAMUSCULAR; INTRAVENOUS at 06:20

## 2021-01-27 RX ADMIN — PIPERACILLIN AND TAZOBACTAM 2.25 G: 2; .25 INJECTION, POWDER, FOR SOLUTION INTRAVENOUS at 12:21

## 2021-01-27 RX ADMIN — Medication 40 MG: at 07:51

## 2021-01-27 RX ADMIN — Medication 50 MCG/HR: at 04:06

## 2021-01-27 RX ADMIN — HYDROCORTISONE SODIUM SUCCINATE 50 MG: 100 INJECTION, POWDER, FOR SOLUTION INTRAMUSCULAR; INTRAVENOUS at 00:05

## 2021-01-27 ASSESSMENT — ACTIVITIES OF DAILY LIVING (ADL)
ADLS_ACUITY_SCORE: 16
ADLS_ACUITY_SCORE: 17
ADLS_ACUITY_SCORE: 16
ADLS_ACUITY_SCORE: 16

## 2021-01-27 NOTE — PROGRESS NOTES
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 She was transferred to OSH ICU 1/22 and intubated and requiring vasopressors. 1/24 transferred to [facility] Medical ICU service for ongoing management. Outside chest CT showed bilateral infiltrates. Antibiotic treatment with vanc, zosyn, and bactrim (steno history). She was bronched with no specific growth on BAL ( with neutrophil predominance), left diagnostic thoracentesis was done which returned exudative with inflammatory cells but no specific growth. Blood and urine cultures have remained negative. IgE was not elevated.  She was extubated on 1/26/2021, however, patient re-intubated overnight due to worsening O2 requirement. CXR with worsened consolidations and concern for possible edema. BNP is 28,000. 2D echo showed preserved EF.   She normally has HD (2x weekly), had a HD session yesterday (1/26) but had less than 500 ml removed. Fluid removal with additional HD will likely help with successful liberation from ventilator. WBC continues to trend down slightly and is off pressors. Will continue continue antibiotics regimen for now. Obtain CT Chest now        The patient was seen and examined with the resident/fellow physician.  We have discussed the patient in detail and I agree with the findings, assessment, and plan as documented when this note was cosigned on this day. The plan was formulated in conjunction with pharmacy, ICU nurses, and respiratory therapist. I have evaluated all laboratory values and imaging studies for the  past 24 hours. I have reviewed all the consults that have been ordered and are active for this patient.      Critical Care Time: 30 min.  I spent this time (excluding procedures) personally providing and directing critical care services at the bedside and on the critical care unit.      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 She was transferred to OSH ICU 1/22 and intubated and requiring vasopressors. 1/24 transferred to [facility] Medical ICU service for ongoing management.     CHANGES and MAJOR THINGS TODAY:   - Patient re-intubated overnight due to worsening O2 requirement. CXR with worsened consolidations and concern for possible edema.  - Will obtain CT chest this AM  - Will discuss running dialysis again today to take off volume given concern for edema, consider goal to take ~1-2 L off  - Patient weaned off pressors 1/26 currently hemodynamically stable off pressors  - Will reduce hydrocortisone to BID  - Continue antibiotic treatment with vanc, zosyn, and bactrim (steno history), f/u work up resuts     PLAN:     Neuro:  # Pain and sedation  - continue versed and fentanyl ggts     Pulmonary:  # Acute Hypoxic Respiratory Failure  Suspected 2/2 pneumonia. OSH CT 1/23 + bilateral ground glass opacities and consolidative lung infiltrates centrally distributed to the upper lobes and RLL, . Covid-19 negative X3 at OSH. Patient re-intubated overnight due to worsening O2 requirement.  CXR with worsened consolidations and concern for possible edema.  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  - Continue PTA tacrolimus  - Hold PTA prednisone while on stress steroids  - Pulmonary lung transplant team consulted, appreciate recs     Cardiovascular:  # Shock  # HX Hypertension  Patient was transferred on Levophed. Right heart catheterization 03/10/2020 concluded right sided filling pressures mildly elevated, mild PHTN, mild right sided filling pressures, and normal cardiac output. Last ECHO 10/21/20 with EF 60-65%, normal LV & RV function.   - Patient weaned off pressors 1/26 currently hemodynamically stable off pressors  - Will reduce hydrocortisone to BID  - Hold PTA coreg and amlodipine      GI/Nutrition:  #Portal HTN  Liver biopsy positive for congestive hepatopathy. Previous had ascites thought to be r/t elevated right sided heart pressures. Last saw GI on 12/21/20 and patient endorsed that her ascites is no longer present.   - NTD     #Nutrition  - RD consulted for Tube Feeds  - Rads to place post pyloric tube    # Constipation  Patient reportedly has history of constipation with bactrim use  - Scheduled miralax and senna-doc ordered  - PRN dulcolax ordered     Renal/Fluids/Electrolytes:  # ESRD on iHD twice weekly (M/Th).   # Anion gap metabolic acidosis r/t ESRD, uremia (OSH ABG  7.31/37/120/19 & Anion Gap 25.7)   Outpatient dry body weight 56.5 kg. Ran dialysis 1/26  - Nephrology Consulted   - Will discuss running again today to take off volume given concern for edema, consider goal to take ~1-2 L off  - I&O  - Daily Weights   - Trend BMP      Endocrine:  # Seronegative RA with Raynaud's   - NTD, monitor      High intensity sliding scale insulin ordered given stress dose steroids, will consider glargine vs ggt with continued elevations     ID:  # Sepsis   Suspected related to bacterial pneumonia. OSH CT 1/23 + bilateral ground glass opacities and consolidative lung infiltrates centrally distributed to the upper lobes and RLL. Covid-19 negative X3 at OSH. Procalcitonin negative. Urine strep, CMV, and RPP negative.  - Will obtain CT chest w/o contrast today to further evaluate   - Bronch completed, KOH and Gram not revealing,  with neutrophil predominance, other labs pending  - s/p thora 1/25, fluid exudative, micro pending  - F/U pending micro w/u   - Continue antibiotic treatment with vanc, zosyn, and bactrim (steno history)     # Chronic/Stable right aspergillus empyema   - Continue PTA posaconazole, will monitor QTc     Hematology:    # Anemia r/t renal disease, chronic stable   # Thrombocytopenia r/t critical illness, resolved   # Leukocytosis, infection/steroids  Takes aranesp 25 mcg every four weeks, last dose 01/14.    - Daily CBC     Musculoskeletal:  - PT/OT when appropriate      Skin:  # Dermatomyositis antitryptase syndrome   - Noted, NTD     General Cares/Prophylaxis:    DVT Prophylaxis: Heparin SQ  GI Prophylaxis: PPI  Restraints: Not indicated  Family Communication:  updated at bedside  Code Status: Full      Lines/tubes/drains:  - Basilio  - CVC  - PIVs     Disposition:  - Medical ICU    Patient seen and findings/plan discussed with medical ICU staff, Dr. Jean.    Tomas Rolle    ====================================  INTERVAL HISTORY:   Overnight patient was  re-intubated due to worsening oxygen requirement. She remains off pressors. Patient denies any acute symptoms this AM. She feels comfortable on the breathing machine. She denies any new pains. Per discussion with RN, patient was complaining of constipation prior to intubation.     OBJECTIVE:   1. VITAL SIGNS:   Temp:  [97.1  F (36.2  C)-98.2  F (36.8  C)] 97.7  F (36.5  C)  Pulse:  [] 90  Resp:  [17-25] 25  BP: ()/(54-91) 99/64  FiO2 (%):  [50 %-100 %] 50 %  SpO2:  [84 %-100 %] 99 %  Ventilation Mode: CMV/AC  (Continuous Mandatory Ventilation/ Assist Control)  FiO2 (%): 50 %  Rate Set (breaths/minute): 24 breaths/min  Tidal Volume Set (mL): 320 mL  PEEP (cm H2O): 8 cmH2O  Pressure Support (cm H2O): 7 cmH2O  Oxygen Concentration (%): 50 %  Resp: 25    2. INTAKE/ OUTPUT:   I/O last 3 completed shifts:  In: 1495.16 [I.V.:775.16; NG/GT:190; IV Piggyback:500]  Out: 380 [Urine:30; Other:350]    3. PHYSICAL EXAMINATION:  General: Laying in bed. No acute distress. Sedated and intubated  HEENT: PERRLA. Following commands. Responding appropriately. No focal deficits.  Neuro: A&Ox3, NAD  Pulm/Resp: Breath sounds coarse/dim throughout, particularly notable in the right upper region  CV: RRR  Abdomen: Soft, non-distended, non-tender  Incisions/Skin: Left AV fistula with thrill and bruit    4. LABS:   Arterial Blood Gases   Recent Labs   Lab 01/27/21  0540 01/27/21  0316 01/27/21  0147 01/24/21  1529   PH 7.39 7.41 7.39 7.36   PCO2 41 38 40 29*   PO2 79* 59* 45* 177*   HCO3 25 24 24 16*     Complete Blood Count   Recent Labs   Lab 01/27/21  0414 01/26/21  0425 01/25/21  0406 01/24/21  1458   WBC 15.2* 16.9* 12.5* 20.5*   HGB 10.8* 10.5* 10.9* 11.1*    252 167 192     Basic Metabolic Panel  Recent Labs   Lab 01/27/21  0414 01/26/21  0425 01/25/21  0406 01/24/21  1653 01/24/21  1458    134 135  --  134   POTASSIUM 3.8 4.0 4.1 3.8 3.7   CHLORIDE 101 100 101  --  98   CO2 23 19* 16*  --  16*   BUN 44* 72* 58*   --  48*   CR 3.18* 4.45* 3.73*  --  3.39*   * 276* 187*  --  155*     Liver Function Tests  Recent Labs   Lab 01/27/21  0414 01/26/21  0425 01/24/21  1458   AST 15 11 31   ALT 30 30 35   ALKPHOS 171* 184* 244*   BILITOTAL 0.8 0.8 1.2   ALBUMIN 2.2* 2.0* 2.0*     Coagulation Profile  No lab results found in last 7 days.    5. RADIOLOGY:   Recent Results (from the past 24 hour(s))   XR Abdomen Port 1 View    Narrative    Exam: XR ABDOMEN PORT 1 VW, 1/26/2021 1:01 PM    Indication: NJ    Comparison: 10/24/2019    Findings:   Supine AP views of the abdomen were obtained. The enteric tube is  coiled in the distal stomach with the tip positioned over the fundus.  Partially imaged central venous catheter tip projects over the high  right atrium. Paucity small bowel gas without air-filled dilated loops  of bowel, pneumatosis, or portal venous gas. Bibasilar opacities,  better demonstrated on recent chest radiograph. Degenerative changes  in the lumbar spine. No acute osseous abnormalities. Soft tissue edema  along the flanks.      Impression    Impression:   The enteric tube tip projects over the gastric fundus. Recommend  repositioning if postpyloric position is desired.     NEAL SANTACRUZ MD   XR Abdomen Port 1 View    Narrative    EXAM: XR ABDOMEN PORT 1 VW  1/26/2021 4:16 PM     HISTORY:  NJ repositioned       COMPARISON:  Abdominal radiograph from same date    FINDINGS:   Portable semiupright view of the abdomen. The feeding tube has been  repositioned with tip projecting at the expected location of the  gastric antrum. Nonobstructive bowel gas pattern. Partially visualized  median sternotomy wires. Bibasilar opacities. Osseous structures are  within normal limits.      Impression    IMPRESSION:   Feeding tube tip now projects over the gastric antrum.     I have personally reviewed the examination and initial interpretation  and I agree with the findings.    ANNE MANZANO MD   XR Chest Port 1 View     Narrative    Exam: XR CHEST PORT 1 VW, 1/27/2021 1:54 AM    Indication: increased oxygen requirements    Comparison: 1/25/2021 chest x-ray    Findings:   Semiupright AP view of the chest. Right IJ venous catheter tip  terminates over the cavoatrial junction with loop projecting over the  lower cervical soft tissues. Enteric tube termination projects  inferior to the field-of-view. Median sternotomy wires are intact.  Postsurgical changes of bilateral lung transplant.    The trachea is midline. Cardiac silhouette is not well-visualized.  Diffuse, mixed interstitial and airspace opacities have increased in  degree, most prominently in the right upper lobe and left midlung. No  significant pleural effusion. No pneumothorax.      Impression    Impression:   1. Increased mixed interstitial and airspace opacities, most notably  in the right upper lobe and left midlung.  2. Support devices as described in body of report.    I have personally reviewed the examination and initial interpretation  and I agree with the findings.    SUNITA ADKINS MD   XR Chest Port 1 View    Narrative    Exam: XR CHEST PORT 1 VW, 1/27/2021 4:34 AM    Indication: s/p intubation    Comparison: 1/27/2021 chest x-ray at 01:34    Findings:   Semiupright AP view of the chest. Endotracheal tube termination  projects 3.2 cm proximal to the gee. Right IJ venous catheter  projects over the cavoatrial junction. Median sternotomy wires are  intact. Enteric tube courses inferior to the field of view.    Trachea is midline. Cardiac silhouette is normal size. Stable  postsurgical changes of bilateral lung transplant. Stable, diffuse  patchy mixed interstitial and airspace opacities. No significant  pleural effusion. No pneumothorax.      Impression    Impression:   1. Endotracheal tube terminates 3.2 cm proximal to the gee.  Remaining support devices are unchanged in position.  2. Stable diffuse, patchy mixed interstitial and airspace opacities    I  have personally reviewed the examination and initial interpretation  and I agree with the findings.    SUNITA ADKINS MD

## 2021-01-27 NOTE — ANESTHESIA PROCEDURE NOTES
Airway   Date/Time: 1/27/2021 3:41 AM   Patient location during procedure: ICU  Staff -   CRNA: Cristal Chin APRN CRNA  Performed By: CRNA    Consent for Airway   Urgency: emergentConsent: The procedure was performed in an emergent situation.    Report Obtained from Primary Care Team  History regarding most recent potassium obtained: Yes  History regarding presence/absence of renal failure obtained:Yes  History regarding stroke/CVA obtained:Yes  History regarding presence/absence of NM disorder:Yes    Indications and Patient Condition  Indications for airway management: respiratory insufficiency  Mallampati: IIInduction type:RSIMask difficulty assessment: 1 - vent by mask    Final Airway Details  Final airway type: endotracheal airway  Successful airway:ETT - single  Endotracheal Airway Details   ETT size (mm): 7.0  Cuffed: yes  Successful intubation technique: video laryngoscopy  Grade View of Cords: 1  Adjucts: stylet  Measured from: lips  Secured at (cm): 21  Secured with: commercial tube jordan    Post intubation assessment   ETT secured, Vent settings by primary/ICU team, Primary/ICU team to review CXR and Sedation to be ordered by primary/ICU team  Placement verified by: capnometry, equal breath sounds and chest rise   Number of attempts at approach: 1  Secured with:commercial tube jordan  Ease of procedure: easy  Dentition: Intact and UnchangedAdditional Comments  Patient has worsening ABGs and chest xray.  Patient states it is becoming more labored to breathe.  She agrees with intubation.  Though her breathing was labored, she was able to talk to her  prior to intubation.  No complications.

## 2021-01-27 NOTE — ANESTHESIA CARE TRANSFER NOTE
Patient: Kecia Blue    * No procedures listed *    Diagnosis: * No pre-op diagnosis entered *  Diagnosis Additional Information: No value filed.    Anesthesia Type:   No value filed.     Note:    Oropharynx: endotracheal tube in place  Level of Consciousness: iatrogenic sedation      Independent Airway: airway patency not satisfactory and stable  Dentition: dentition changed  Vital Signs Stable: post-procedure vital signs reviewed and stable  Report to RN Given: handoff report given  Patient transferred to: ICU    ICU Handoff: Call for PAUSE to initiate/utilize ICU HANDOFF, Identified Patient, Identified Responsible Provider, Reviewed the Pertinent Medical History, Discussed Surgical Course, Reviewed Intra-OP Anesthesia Management and Issues during Anesthesia, Set Expectations for Post Procedure Period and Allowed Opportunity for Questions and Acknowledgement of Understanding      Vitals: (Last set prior to Anesthesia Care Transfer)  CRNA VITALS  1/27/2021 0337 - 1/27/2021 0407      1/27/2021             NIBP:  94/62    Pulse:  110    SpO2:  91 %    EKG:          Electronically Signed By: Cristal Chin CRNA, ADRIÁN VARGAS  January 27, 2021  4:07 AM

## 2021-01-27 NOTE — PLAN OF CARE
4C PT cx: patient reports high level of fatigue at time of contact after being reintubated and politely requesting PT to hold this session. Will reschedule.

## 2021-01-27 NOTE — PROGRESS NOTES
Patient Controlled Sedation study  IRB [id number]        Wed Jan 27--patient enrolled in patient controlled sedation study and randomized to dexmedetomidine.  Sedation and analgesia will be under control of the study protocol while patient remains intubated.  Please do not change sedation and iv analgesic medications without contacting Uri Almonte MD  24 hrs per day at  .

## 2021-01-27 NOTE — PLAN OF CARE
Problem: ARDS (Acute Respiratory Distress Syndrome)  Goal: Effective Oxygenation  Outcome: No Change     ICU End of Shift Summary. See flowsheets for vital signs and detailed assessment.    Changes this shift: Pt extubated around 0945 to 4L nasal cannula. Patient alert and oriented after extubation. No pain. Repeat EKG around 1700. NJ placed by dietitian/attempted to rewire - curled in stomach - re-evaluate in AM if needs to be placed by radiology. Speech evaluated pt and pt was placed on full liquid/thin liquid by teaspoon diet. 500 ml bolus given to help with blood pressures. Able to wean and turn off levo around 1800 - will continue to monitor blood pressures. Currently completing HD run. No stool or urine output. No other changes during the day.    Plan: Continue to wean O2 as able. Possibly transfer to floor in AM. Continue to monitor patient and notify team with any changes.

## 2021-01-27 NOTE — PLAN OF CARE
SLP: Pt now intubated and no longer appropriate for ST interventions. Will complete orders. Please re-consult as appropriate.     Speech Language Therapy Discharge Summary    Reason for therapy discharge:    Change in medical status.    Progress towards therapy goal(s). See goals on Care Plan in [facility] electronic health record for goal details.  Goals not met.  Barriers to achieving goals:   limited tolerance for therapy.    Therapy recommendation(s):    No further therapy is recommended.

## 2021-01-27 NOTE — PROVIDER NOTIFICATION
Called to inform MD of further increase in O2 needs *(pt increased from NC to oxyplus mask earlier in evening) and now is on 12L oxyplus.  Writer spoke with respiratory therapy and asked MD for order for high flow nasal canula to increase O2 for patient in hopes of avoiding reintubation.  Dr Carter said she would put in order

## 2021-01-27 NOTE — PROGRESS NOTES
Trial: A PHASE II, OPEN LABEL, RANDOMIZED COMPARISON OF PATIENT SELF-MANAGEMENT OF SEDATIVE THERAPY COMPARED TO USUAL SEDATION PRACTICE. EFFICACY OF SELF-MANAGEMENT OF SEDATIVE THERAPY BY VENTILATED ICU PATIENTS. PATIENT CONTROLLED SEDATION (PCS).    Participation or LAR/Surrogate Prospective Consent     IND Number: [id number]  ClinicalTrials.gov number: [id number]     PI: Uri Almonte -[phone]    Primary : Kecia Bartlett ([email or web address]     24/7 On-Call Coordinator available via call or text [phone]    Patient was pre-screened and consented for PCS trial by note author. The patient was determined to be able to give consent by using the pre-screening tool. Kecia Blue gave consent to participate in this study,  was at bedside. Potential benefits, risks, and study procedures were discussed. Treatment options such as standard of care and study specific events were discussed. An opportunity for questions was provided. Following the consent conversation, the subject was enrolled into the study. Consent at 1522. A copy of the consent was given to the  at approximately 1525.     Subject was randomized to: Patient Controlled Sedation    At time of consent the investigators determined the RASS to be: -1

## 2021-01-27 NOTE — PROGRESS NOTES
Patient with increasing O2 needs and  Now on 100% HFNC.  Not in respiratory distress but CXR shows worsening bilateral infiltrates c/w 2 d ago.  ABGs with PO2 < 60 on 80% so needs intubation before refractory hypoxemia occurs.  Patient was comfortable on versed and fentanyl before so will use that again.

## 2021-01-27 NOTE — PLAN OF CARE
ICU End of Shift Summary. See flowsheets for vital signs and detailed assessment.    Changes this shift:   Respiratory:  Pt started shift on 4L NC and gradually needed more and more O2 to maintain O2 sats at or near 90%.  MD and RT called on multiple occasions and multiple modes of O2 administration tried (oxy plus and HFNC) until pt was on 100% O2 and still not doing well with O2 sats.  Pt was not in distress, using accessory muscles or in tachypnea, but CXR was far worse than previous and ABG had PaO2 of 59, so pt agreed to be intubated at 0340.  Pt was intubated and vent settings changed multiple times, settings currently at PEEP of 8 and 50% with RR of 24 and TV of 320 with PIP of ~ 29.  Pt moderately sedated with 2 mg versed and 50 mcg of fentanyl per hour and BP has been adequate off of pressors.      Basilio was d/c'd last evening, as daily output was only 30ml.      Pt was very concerned about being on bactrim as she was not on it outpatient.  She has a history of severe constipation with this medication and did not want to be on it.  Please address need for this medication/and or figure out proper bowel program        Plan: Continue to monitor and update MD with changes and concerns, wean sedation and vent as tolerated.  Address bactrim plan.    Problem: Adult Inpatient Plan of Care  Goal: Plan of Care Review  Outcome: No Change  Goal: Patient-Specific Goal (Individualized)  Outcome: No Change  Goal: Absence of Hospital-Acquired Illness or Injury  Outcome: No Change  Intervention: Identify and Manage Fall Risk  Recent Flowsheet Documentation  Taken 1/27/2021 0400 by Silvana Cardona, RN  Safety Promotion/Fall Prevention:   activity supervised   assistive device/personal items within reach   clutter free environment maintained   fall prevention program maintained   increased rounding and observation   increase visualization of patient   lighting adjusted   mobility aid in reach   nonskid shoes/slippers when  out of bed   patient and family education   room door open   room near nurse's station   room organization consistent   safety round/check completed   supervised activity  Taken 1/27/2021 0000 by Silvana Cardona RN  Safety Promotion/Fall Prevention:   activity supervised   assistive device/personal items within reach   clutter free environment maintained   fall prevention program maintained   increased rounding and observation   increase visualization of patient   lighting adjusted   mobility aid in reach   nonskid shoes/slippers when out of bed   patient and family education   room door open   room near nurse's station   room organization consistent   safety round/check completed   supervised activity  Taken 1/26/2021 2000 by Silvana Cardona RN  Safety Promotion/Fall Prevention:   activity supervised   assistive device/personal items within reach   clutter free environment maintained   fall prevention program maintained   increased rounding and observation   increase visualization of patient   lighting adjusted   mobility aid in reach   nonskid shoes/slippers when out of bed   patient and family education   room door open   room near nurse's station   room organization consistent   safety round/check completed   supervised activity  Intervention: Prevent Skin Injury  Recent Flowsheet Documentation  Taken 1/27/2021 0400 by Silvana Cardona RN  Body Position: turned  Taken 1/27/2021 0000 by Silvana Cardona RN  Body Position: turned  Taken 1/26/2021 2000 by Silvana Cardona RN  Body Position: turned  Intervention: Prevent and Manage VTE (Venous Thromboembolism) Risk  Recent Flowsheet Documentation  Taken 1/27/2021 0400 by Silvana Cardona RN  VTE Prevention/Management:   bleeding risk assessed   AROM (active range of motion) performed   dorsiflexion/plantar flexion performed  Taken 1/27/2021 0000 by Silvana Cardona RN  VTE Prevention/Management:   bleeding risk assessed   AROM (active range of  motion) performed   dorsiflexion/plantar flexion performed  Taken 1/26/2021 2000 by Silvana Cardona RN  VTE Prevention/Management:   bleeding risk assessed   AROM (active range of motion) performed   dorsiflexion/plantar flexion performed  Goal: Optimal Comfort and Wellbeing  Outcome: No Change  Goal: Readiness for Transition of Care  Outcome: No Change

## 2021-01-27 NOTE — PROGRESS NOTES
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  She who was admitted to OSH on  for acute hypoxemic respiratory failure and new lung opacities. Intubated  and transferred to [facility] for ongoing management.  Extubated  but reintubated  for progressive hypoxia and increased infiltrates on imaging.     Today's recommendations:  - Continue to follow cultures, ABX per MICU  - Continue with QID pulmonary toilet with CPT and nebs  - Stress dose steroids per MICU  - Agree with IR consult for placement of post pyloric feeding tube today  - Agree with plan for chest CT today  - DSA pending  - VGCV for CMV ppx with stress dose steroids  - Next tacro level   - EBV pending  - Dialysis management per nephrology     Acute hypoxic respiratory failure:   Right post-obstructive Stenotrophomonas pneumonia:  Septic shock: Presented to OSH ED with increased SOB and hypoxia, initially requiring 4L NC but continued to decline and subsequently intubated .  Hemodynamic instability after intubation requiring pressors, transferred to [facility].  Afebrile but with leukocytosis.  COVID and respiratory panel negative.  PTA bronch (20) with moderate airway obstruction and RML/RLL mucous plugging, cultures with Stenotrophomonas and Eikenella (IV ceftazidime -).  OSH CT chest with new multifocal GGO bilaterally and increased left loculated pleural effusion.  Repeat bronch () with RUL BAL with thick copious  secretions to RML/RLL.  Etiology likely severe post-obstructive pneumonia, possibly complicated by increased Aspergillus empyema.  CXR (1/25) with stable opacities and small bilateral pleural effusions.  Failed PST x2 on 1/25 with low TV and tachycardia.  Improved PST 1/26 AM so extubated to 4L.  Hypoxia continued to progress t/o the day, pt. was on OxiPlus 12L by evening and then HFNC 100% with poor ABG and increased infiltrates on imaging (>RUL, left mid lung field, personally reviewed).  Reintubated early this morning.   - Blood cultures and fungal blood cultures (1/24) NGTD  - BAL cultures (1/24) NGTD  - BAL PJP (1/24) pending  - ABX per MICU: vancomycin, Zosyn, and Bactrim based on recent cultures  - Ventilator management per MICU, agree with plan for extubation with successful PST this am per Dr. Roberson  - Pulmonary toilet with chest physiotherapy QID and nebs: Duoneb QID and Mucomyst QID  - Stress dose steroids (1/24) per MICU (decreased today to BID)  - Agree with IR consult for placement of post pyloric feeding tube today  - Agree with plan for chest CT today     Aspergillus empyema (left-sided): First noted 10/8/19.  CT scan on 7/17/20 with increased mass-like density, likely pleural-based, in LLL area s/p needle aspiration (8/13/20) with Aspergillus fumigatus on cultures s/p intrapleural bead placement (amphoterecin, 11/20).  Following with ID as OP.  LFTs stable on admission (ALP chronically mildly elevated).  CT chest with increased loculated left pleural effusion s/p thoracentesis (1/25), exudative with 87% neutrophils, very high LDL (6308), cytology normal.  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 Recent CMV (12/9) and DSA (12/23) not detected.  Continued right  mainstem stenosis noted with PTA bronch on 12/23/20, mild on 1/24 bronch.  - DSA (1/25) pending      Immunosuppression: On 2 drug IST d/t recurrent infections  - Tacrolimus 1 mg qAM / 0.5 mg qPM.  Goal level 8-10.  Next level 1/28 (ordered).  - Holding chronic prednisone while on stress dose steroids (PTA 5mg qAM and 2.5mg qPM)     Prophylaxis:   - Bactrim treatment dose as above. PTA was not on PJP ppx since 7/28/20 as CD4>200.      H/o CMV viremia: CMV D+/R+.  CMV <137 (1/25).  - VGCV for CMV ppx with stress dose steroids (1/26)      EBV viremia: Last level 12/9/20 mildly elevated to 10K (log 4) from prior 3K (3.5 log) on 9/9/20.  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 Chronic iHD M/Th via AVF with partial graft. iHD run 1/22 and again on 1/26, primarily for clearance (350 ml UF).  - Monitoring of tacrolimus as above  - Dialysis management per nephrology     We appreciate the excellent care provided by the MICU team. Recommendations communicated via this note. Will continue to follow along closely, please do not hesitate to call with any questions or concerns.     Patient discussed with Dr. Roberson.    Hina Huff, DNP, APRN, CNP  Inpatient Nurse Practitioner  Pulmonary CF/Transplant     Subjective & Interval History:     Pt. was extubated yesterday morning after successful PST.  Initially on 4L NC, hypoxia continued to progress t/o the day and pt. was on OxiPlus 12L by evening and ultimately reintubated overnight from HFNC 100% with increased infiltrates on imaging.  Moderate thick creamy secretions per ETT.  Pressor weaned off last evening and remains off.  HD run for 350 fluid removal.  NJ placement attempt by RD unsuccessful.      Review of Systems:     ROS limited by intubation and sedation    C: No fever, no chills, no change in weight  INTEGUMENTARY/SKIN: No rash or obvious new lesions  ENT/MOUTH: No nasal drainage  RESP: See interval history  CV: No chest pain, no peripheral edema, no  "orthopnea  GI: No nausea, no vomiting  : No dysuria, + oliguria  MUSCULOSKELETAL: No myalgias, no arthralgias  ENDOCRINE: Blood sugars with adequate control  NEURO: No headache  PSYCHIATRIC: Mood stable    Physical Exam:     Vital signs:  Temp: 97.7  F (36.5  C) Temp src: Axillary BP: 99/64 Pulse: 90   Resp: 25 SpO2: 99 % O2 Device: Mechanical Ventilator Oxygen Delivery: (S) 35 LPM Height: 160 cm (5' 2.99\") Weight: 60.9 kg (134 lb 4.2 oz)  I/O:     Intake/Output Summary (Last 24 hours) at 1/27/2021 1013  Last data filed at 1/27/2021 0800  Gross per 24 hour   Intake 1405.48 ml   Output 380 ml   Net 1025.48 ml     Constitutional: Lying in bed, intubated,  at bedside, in no apparent distress.   HEENT: Eyes with pink conjunctivae, anicteric. Orally intubated.  PULM: Mildly diminished air flow bilaterally. Bronchial breath sounds on right, no rhonchi, no wheezes. Non-labored breathing on full vent support.  CV: Normal S1 and S2. Tachycardia. No murmur, gallop, or rub. Trace peripheral edema.   ABD: NABS, soft, nontender, nondistended.    MSK: Moves all extremities. No apparent muscle wasting.   NEURO: Lethargic but easily arousable, answering simple questions and attempting to mouth words.   SKIN: Warm, dry. No rash on limited exam.   PSYCH: Calm.     Lines, Drains, and Devices:  CVC Double Lumen 10/25/19 Right Internal jugular (Active)   Number of days: 460       Hemodialysis Vascular Access Arteriovenous fistula Left Arm (Active)   Site Assessment WDL;Bruit present;Thrill present 01/27/21 0400   Cannulation Needle Size 15 01/26/21 1630   Dressing Intervention New dressing 01/26/21 1920   Dressing Status Clean, dry, intact 01/27/21 0400   Number of days: 2       CVC TRIPLE LUMEN Right Internal jugular (Active)   Site Assessment WDL 01/27/21 0400   Dressing Type Chlorhexidine sponge;Transparent 01/27/21 0400   Dressing Status clean;dry;intact 01/27/21 0400   Dressing Intervention dressing changed 01/25/21 1800 "   Dressing Change Due 01/31/21 01/27/21 0400   Line Necessity yes, meets criteria 01/27/21 0400   Blue - Status infusing 01/27/21 0400   Blue - Cap Change Due 01/22/21 01/27/21 0400   Brown - Status saline locked 01/27/21 0400   Brown - Cap Change Due 01/29/21 01/27/21 0400   White - Status infusing 01/27/21 0400   White - Cap Change Due 01/29/21 01/27/21 0400   Phlebitis Scale 0-->no symptoms 01/27/21 0400   Infiltration? no 01/27/21 0400   Infiltration Scale 0 01/27/21 0400   Infiltration Site Treatment Method  None 01/27/21 0400   CVC Comment CDI 01/26/21 2200   Number of days: 3     Data:     LABS    CMP:   Recent Labs   Lab 01/27/21 0414 01/26/21  0425 01/25/21  0406 01/24/21  1653 01/24/21  1458    134 135  --  134   POTASSIUM 3.8 4.0 4.1 3.8 3.7   CHLORIDE 101 100 101  --  98   CO2 23 19* 16*  --  16*   ANIONGAP 11 15* 17*  --  20*   * 276* 187*  --  155*   BUN 44* 72* 58*  --  48*   CR 3.18* 4.45* 3.73*  --  3.39*   GFRESTIMATED 15* 10* 13*  --  14*   GFRESTBLACK 18* 12* 15*  --  16*   CHELSEY 8.4* 8.0* 8.1*  --  8.2*   MAG 2.2 2.8* 2.5* 2.5* 2.4*   PHOS 5.1* 7.0* 6.8* 6.5* 6.5*   PROTTOTAL 6.0* 6.0*  6.0*  --   --  6.1*   ALBUMIN 2.2* 2.0*  --   --  2.0*   BILITOTAL 0.8 0.8  --   --  1.2   ALKPHOS 171* 184*  --   --  244*   AST 15 11  --   --  31   ALT 30 30  --   --  35     CBC:   Recent Labs   Lab 01/27/21 0414 01/26/21  0425 01/25/21  0406 01/24/21  1458   WBC 15.2* 16.9* 12.5* 20.5*   RBC 3.56* 3.45* 3.59* 3.63*   HGB 10.8* 10.5* 10.9* 11.1*   HCT 32.1* 29.7* 33.4* 34.6*   MCV 90 86 93 95   MCH 30.3 30.4 30.4 30.6   MCHC 33.6 35.4 32.6 32.1   RDW 14.5 13.7 14.4 14.7    252 167 192       INR: No lab results found in last 7 days.    Glucose:   Recent Labs   Lab 01/27/21  0756 01/27/21  0414 01/27/21  0412 01/27/21  0010 01/26/21  2107 01/26/21  1635 01/26/21  1142 01/26/21  0425 01/25/21  0406 01/25/21  0406 01/24/21  1458 01/24/21  1458   GLC  --  172*  --   --   --   --   --  276*  --   187*  --  155*   *  --  162* 160* 155* 171* 216*  --    < >  --    < > 150*    < > = values in this interval not displayed.       Blood Gas:   Recent Labs   Lab 01/27/21  0540 01/27/21  0316 01/27/21  0147 01/26/21  0425 01/25/21  0406   PHV  --   --   --  7.42 7.37   PCO2V  --   --   --  33* 31*   PO2V  --   --   --  49* 54*   HCO3V  --   --   --  21 17*   O2PER 50.0 80.0 12L 45.0 50.0       Culture Data   Recent Labs   Lab 01/25/21  1347 01/24/21  1841 01/24/21  1830 01/24/21  1729 01/24/21  1652 01/24/21  1629   CULT Culture negative after 18 hours  Culture negative monitoring continues  Culture negative monitoring continues No growth after 3 days No growth after 2 days No growth No growth after 3 days No growth after 2 days  Culture negative after 2 days  No growth  Culture negative monitoring continues       Virology Data:   Lab Results   Component Value Date    FLUAH1 Not Detected 01/24/2021    FLUAH3 Not Detected 01/24/2021    KG5814 Not Detected 01/24/2021    IFLUB Not Detected 01/24/2021    RSVA Not Detected 01/24/2021    RSVB Not Detected 01/24/2021    PIV1 Not Detected 01/24/2021    PIV2 Not Detected 01/24/2021    PIV3 Not Detected 01/24/2021    HMPV Not Detected 01/24/2021    HRVS Negative 01/24/2021    ADVBE Negative 01/24/2021    ADVC Negative 01/24/2021    ADVC Negative 12/23/2020    ADVC Negative 10/07/2019       Historical CMV results (last 3 of prior testing):  Lab Results   Component Value Date    CMVQNT <137 (A) 01/25/2021    CMVQNT 238 (A) 01/24/2021    CMVQNT 675 (A) 12/23/2020     Lab Results   Component Value Date    CMVLOG <2.1 01/25/2021    CMVLOG 2.4 (H) 01/24/2021    CMVLOG 2.8 (H) 12/23/2020       Urine Studies    Recent Labs   Lab Test 01/24/21  1729 10/21/19  2240   URINEPH 5.0 5.0   NITRITE Negative Negative   LEUKEST Moderate* Large*   WBCU 34* 115*       Most Recent Breeze Pulmonary Function Testing (FVC/FEV1 only)  FVC-Pre   Date Value Ref Range Status   12/09/2020  1.12 L    2020 1.56 L    2020 1.57 L    2020 1.78 L      FVC-%Pred-Pre   Date Value Ref Range Status   2020 34 %    2020 47 %    2020 48 %    2020 54 %      FEV1-Pre   Date Value Ref Range Status   2020 0.98 L    2020 1.10 L    2020 0.96 L    2020 0.99 L      FEV1-%Pred-Pre   Date Value Ref Range Status   2020 37 %    2020 42 %    2020 37 %    2020 38 %        IMAGING    Recent Results (from the past 48 hour(s))   Echo Complete    Narrative    [id number]  [id number]  [id number]  [id number]^ISABELLA^CHRISTINE           [facility],[place]  Echocardiography Laboratory  [address] [place] [id number]     Name: SARAI KILLIAN  MRN: [id number]  : 1960  Study Date: 2021 01:55 PM  Age: 60 yrs  Gender: Female  Patient Location: [facility] – [place], [place]  Reason For Study: Shock  Ordering Physician: CHRISTINE MASON  Performed By: Maya Chin RDCS     BSA: 1.6 m2  Height: 62 in  Weight: 129 lb  HR: 74  BP: 79/52 mmHg  _____________________________________________________________________________  __        Procedure  Complete Portable Echo Adult.  _____________________________________________________________________________  __        Interpretation Summary  Global and regional left ventricular function is normal with an EF of 60-65%.  Right ventricular function, chamber size, wall motion, and thickness are  normal.  IVC diameter >2.1 cm collapsing <50% with sniff suggests a high RA pressure  estimated at 15 mmHg or greater.  _____________________________________________________________________________  __        Left Ventricle  Global and regional left ventricular function is normal with an EF of 60-65%.  Left ventricular wall thickness is normal. Left ventricular size is normal.  Left ventricular diastolic function is normal. No regional wall motion  abnormalities are seen.     Right Ventricle  Right ventricular  function, chamber size, wall motion, and thickness are  normal.     Atria  The right atria appears normal. Moderate left atrial enlargement is present.     Mitral Valve  The mitral valve is normal. Trace mitral insufficiency is present.        Aortic Valve  The valve leaflets are not well visualized. On Doppler interrogation, there is  no significant stenosis or regurgitation.     Tricuspid Valve  Mild to moderate tricuspid insufficiency is present. The right ventricular  systolic pressure is approximated at 24.6 mmHg plus the right atrial pressure.     Pulmonic Valve  The pulmonic valve is normal.     Vessels  The aorta root is normal. The pulmonary artery and bifurcation cannot be  assessed. IVC diameter >2.1 cm collapsing <50% with sniff suggests a high RA  pressure estimated at 15 mmHg or greater.     Pericardium  No pericardial effusion is present.     _____________________________________________________________________________  __  MMode/2D Measurements & Calculations  IVSd: 0.86 cm  LVIDd: 4.9 cm  LVIDs: 3.0 cm  LVPWd: 0.86 cm  FS: 39.4 %     LV mass(C)d: 146.0 grams  LV mass(C)dI: 92.0 grams/m2  asc Aorta Diam: 2.4 cm  LVOT diam: 2.1 cm  LVOT area: 3.4 cm2  LA Volume Index (BP): 45.3 ml/m2  RWT: 0.35  TAPSE: 1.9 cm        Doppler Measurements & Calculations  MV E max herberth: 84.5 cm/sec  MV A max herberth: 62.5 cm/sec  MV E/A: 1.4  MV dec slope: 502.5 cm/sec2  MV dec time: 0.17 sec     PA acc time: 0.10 sec  TR max herberth: 248.0 cm/sec  TR max P.6 mmHg  E/E' avg: 10.1  Lateral E/e': 9.3  Medial E/e': 10.9     _____________________________________________________________________________  __           Report approved by: Glen Zhang 2021 02:40 PM      XR Chest Port 1 View    Narrative    EXAM: XR CHEST PORT 1 VW  2021 6:41 PM     HISTORY:  post left thoracentesis       COMPARISON:  Chest x-ray from same date at 0812 hours    FINDINGS:   Portable upright view of the chest. Endotracheal tube tip  projects  over the low thoracic trachea. Right internal jugular central venous  catheter tip projects over the distal SVC, with coiling of the  proximal tubing, unchanged from prior. Scattered mediastinal surgical  clips. Trachea is midline. Cardiac silhouette is stable. No  significant change in the diffuse mixed interstitial and airspace  opacities bilaterally. Small bilateral pleural effusions. No  pneumothorax. Intact median sternotomy wires.      Impression    IMPRESSION:    1. No significant change in the diffuse mixed interstitial and  airspace opacities.  2. Small bilateral pleural effusions.  3. Unchanged coiling of the proximal right internal jugular central  venous catheter. Recommend correlation to ensure that the coiling is  external to the patient.    I have personally reviewed the examination and initial interpretation  and I agree with the findings.    TALI CARRANZA MD   XR Abdomen Port 1 View    Narrative    Exam: XR ABDOMEN PORT 1 VW, 1/26/2021 1:01 PM    Indication: NJ    Comparison: 10/24/2019    Findings:   Supine AP views of the abdomen were obtained. The enteric tube is  coiled in the distal stomach with the tip positioned over the fundus.  Partially imaged central venous catheter tip projects over the high  right atrium. Paucity small bowel gas without air-filled dilated loops  of bowel, pneumatosis, or portal venous gas. Bibasilar opacities,  better demonstrated on recent chest radiograph. Degenerative changes  in the lumbar spine. No acute osseous abnormalities. Soft tissue edema  along the flanks.      Impression    Impression:   The enteric tube tip projects over the gastric fundus. Recommend  repositioning if postpyloric position is desired.     NEAL SANTACRUZ MD   XR Abdomen Port 1 View    Narrative    EXAM: XR ABDOMEN PORT 1 VW  1/26/2021 4:16 PM     HISTORY:  NJ repositioned       COMPARISON:  Abdominal radiograph from same date    FINDINGS:   Portable semiupright view of the  abdomen. The feeding tube has been  repositioned with tip projecting at the expected location of the  gastric antrum. Nonobstructive bowel gas pattern. Partially visualized  median sternotomy wires. Bibasilar opacities. Osseous structures are  within normal limits.      Impression    IMPRESSION:   Feeding tube tip now projects over the gastric antrum.     I have personally reviewed the examination and initial interpretation  and I agree with the findings.    ANNE MANZANO MD   XR Chest Port 1 View    Narrative    Exam: XR CHEST PORT 1 , 1/27/2021 1:54 AM    Indication: increased oxygen requirements    Comparison: 1/25/2021 chest x-ray    Findings:   Semiupright AP view of the chest. Right IJ venous catheter tip  terminates over the cavoatrial junction with loop projecting over the  lower cervical soft tissues. Enteric tube termination projects  inferior to the field-of-view. Median sternotomy wires are intact.  Postsurgical changes of bilateral lung transplant.    The trachea is midline. Cardiac silhouette is not well-visualized.  Diffuse, mixed interstitial and airspace opacities have increased in  degree, most prominently in the right upper lobe and left midlung. No  significant pleural effusion. No pneumothorax.      Impression    Impression:   1. Increased mixed interstitial and airspace opacities, most notably  in the right upper lobe and left midlung.  2. Support devices as described in body of report.    I have personally reviewed the examination and initial interpretation  and I agree with the findings.    SUNITA ADKINS MD   XR Chest Port 1 View    Narrative    Exam: XR CHEST PORT 1 , 1/27/2021 4:34 AM    Indication: s/p intubation    Comparison: 1/27/2021 chest x-ray at 01:34    Findings:   Semiupright AP view of the chest. Endotracheal tube termination  projects 3.2 cm proximal to the gee. Right IJ venous catheter  projects over the cavoatrial junction. Median sternotomy wires are  intact. Enteric  tube courses inferior to the field of view.    Trachea is midline. Cardiac silhouette is normal size. Stable  postsurgical changes of bilateral lung transplant. Stable, diffuse  patchy mixed interstitial and airspace opacities. No significant  pleural effusion. No pneumothorax.      Impression    Impression:   1. Endotracheal tube terminates 3.2 cm proximal to the gee.  Remaining support devices are unchanged in position.  2. Stable diffuse, patchy mixed interstitial and airspace opacities    I have personally reviewed the examination and initial interpretation  and I agree with the findings.    SUNITA ADKINS MD

## 2021-01-27 NOTE — PROGRESS NOTES
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 Nephrology consutled for dialysis needs.     Interval History :   Mrs Blue last had HD on 1/22, up a bit in wt but likely this is a difference in scales, does have some fluid on exam.  Had HD yesterday, tried for 1L of UF but achieved 350cc due to relative hypotension.  Strongly considered running again today as she was re-intubated overnight but CT suggests a PNA process (although there is some fluid component) so will hold off today.  Will lengthen run tomorrow to try to pull more fluid (2-3L), waiting will avoid accessing her graft frequently as it has had some issues historically.       Assessment & Recommendations:   ESRD-Runs at [place] through [facility] group.  Has atypical HD schedule of Monday and Thursday, has AVF with partial graft.  Acutely CRRT has been considered but weaned down to 1 pressor and is stable so will try to avoid temp line and use fistula for now.                 -Holding on run today but will plan for longer run tomorrow with 2-3L of UF.                 -Access is L arm AVF/graft     Volume status-Minimal edema on exam, tried to pull 1L yesterday but only achieved ~350cc due to hypotension.  Re-intubated overnight but not overtly a fluid overload issue on CT done this am, will plan for HD tomorrow, lengthen run and try to get more fluid.       Electrolytes/pH-K 3.8, bicarb 23, holding on run today.      Ca/phos/pth-Ca 8.4, Mg and Phos mildly up.      Anemia-Hgb 10.8, acute management per team.       Nutrition-Nutren Tf.      Seen and discussed with   "Will     Recommendations were communicated to primary team via verbal communication.        ADRIÁN Lo CNS  Clinical Nurse Specialist  [phone]      Review of Systems:   I reviewed the following systems:  Gen: No fevers or chills  CV: No CP at rest  Resp: No SOB at rest  GI: No N/V    Physical Exam:   I/O last 3 completed shifts:  In: 1495.16 [I.V.:775.16; NG/GT:190; IV Piggyback:500]  Out: 380 [Urine:30; Other:350]   BP 97/69   Pulse 79   Temp 97.7  F (36.5  C) (Axillary)   Resp 25   Ht 1.6 m (5' 2.99\")   Wt 60.9 kg (134 lb 4.2 oz)   LMP 06/07/2014 (Exact Date)   SpO2 95%   BMI 23.79 kg/m       GENERAL APPEARANCE: Reintubated, in no distress.    EYES: no scleral icterus  Endo: no moon facies  Pulmonary: Decreased BS  CV: regular rhythm, normal rate - Edema present  GI: soft, non distended  MS: no evidence of inflammation in joints, no muscle tenderness  :  Basilio present  SKIN: warm, dry,  NEURO: No focal deficits.      Labs:   All labs reviewed by me  Electrolytes/Renal -   Recent Labs   Lab Test 01/27/21 0414 01/26/21 0425 01/25/21  0406    134 135   POTASSIUM 3.8 4.0 4.1   CHLORIDE 101 100 101   CO2 23 19* 16*   BUN 44* 72* 58*   CR 3.18* 4.45* 3.73*   * 276* 187*   CHELSEY 8.4* 8.0* 8.1*   MAG 2.2 2.8* 2.5*   PHOS 5.1* 7.0* 6.8*       CBC -   Recent Labs   Lab Test 01/27/21 0414 01/26/21  0425 01/25/21  0406   WBC 15.2* 16.9* 12.5*   HGB 10.8* 10.5* 10.9*    252 167       LFTs -   Recent Labs   Lab Test 01/27/21 0414 01/26/21  0425 01/24/21  1458   ALKPHOS 171* 184* 244*   BILITOTAL 0.8 0.8 1.2   ALT 30 30 35   AST 15 11 31   PROTTOTAL 6.0* 6.0*  6.0* 6.1*   ALBUMIN 2.2* 2.0* 2.0*       Iron Panel -   Recent Labs   Lab Test 12/13/18  1033 08/01/18  0921 05/08/18  0709 05/08/18  0709   IRON 16* 93  --  48   IRONSAT 7* 37  --  18   YOLA 302* 571*   < >  --     < > = values in this interval not displayed.           Current Medications:    acetylcysteine  2 mL " Nebulization 4x Daily     B and C vitamin Complex with folic acid  5 mL Per Feeding Tube Daily     budesonide  0.5 mg Nebulization BID     heparin ANTICOAGULANT  5,000 Units Subcutaneous Q8H     hydrocortisone sodium succinate PF  50 mg Intravenous BID     insulin aspart  1-12 Units Subcutaneous Q4H     ipratropium - albuterol 0.5 mg/2.5 mg/3 mL  3 mL Nebulization 4x daily     pantoprazole  40 mg Oral QAM AC     piperacillin-tazobactam  2.25 g Intravenous Q6H     polyethylene glycol  17 g Oral or Feeding Tube Daily     posaconazole (NOXAFIL) intermittent infusion  300 mg Intravenous Daily     [Held by provider] predniSONE  2.5 mg Oral or Feeding Tube QPM     [Held by provider] predniSONE  5 mg Oral or Feeding Tube QAM     senna-docusate  2 tablet Oral or Feeding Tube BID     sulfamethoxazole-trimethoprim  295 mg Oral or Feeding Tube BID     tacrolimus  0.5 mg Oral or Feeding Tube QPM     tacrolimus  1 mg Oral or Feeding Tube QAM     valGANciclovir  450 mg Oral Once per day on Mon Thu     vancomycin place jordan - receiving intermittent dosing  1 each Does not apply See Admin Instructions       dextrose       fentaNYL 50 mcg/hr (01/27/21 1000)     midazolam 1 mg/hr (01/27/21 1000)     norepinephrine Stopped (01/26/21 1800)

## 2021-01-28 ENCOUNTER — APPOINTMENT (OUTPATIENT)
Dept: GENERAL RADIOLOGY | Facility: CLINIC | Age: 61
End: 2021-01-28
Attending: INTERNAL MEDICINE
Payer: COMMERCIAL

## 2021-01-28 LAB
ACID FAST STN SPEC QL: NORMAL
ALBUMIN SERPL-MCNC: 1.8 G/DL (ref 3.4–5)
ALP SERPL-CCNC: 164 U/L (ref 40–150)
ALT SERPL W P-5'-P-CCNC: 26 U/L (ref 0–50)
ANION GAP SERPL CALCULATED.3IONS-SCNC: 11 MMOL/L (ref 3–14)
AST SERPL W P-5'-P-CCNC: 10 U/L (ref 0–45)
BASE DEFICIT BLDV-SCNC: 1 MMOL/L
BILIRUB SERPL-MCNC: 0.6 MG/DL (ref 0.2–1.3)
BUN SERPL-MCNC: 53 MG/DL (ref 7–30)
CALCIUM SERPL-MCNC: 7.9 MG/DL (ref 8.5–10.1)
CHLORIDE SERPL-SCNC: 103 MMOL/L (ref 94–109)
CO2 SERPL-SCNC: 22 MMOL/L (ref 20–32)
CREAT SERPL-MCNC: 3.77 MG/DL (ref 0.52–1.04)
ERYTHROCYTE [DISTWIDTH] IN BLOOD BY AUTOMATED COUNT: 14.7 % (ref 10–15)
GFR SERPL CREATININE-BSD FRML MDRD: 12 ML/MIN/{1.73_M2}
GLUCOSE BLDC GLUCOMTR-MCNC: 145 MG/DL (ref 70–99)
GLUCOSE BLDC GLUCOMTR-MCNC: 154 MG/DL (ref 70–99)
GLUCOSE BLDC GLUCOMTR-MCNC: 164 MG/DL (ref 70–99)
GLUCOSE BLDC GLUCOMTR-MCNC: 167 MG/DL (ref 70–99)
GLUCOSE BLDC GLUCOMTR-MCNC: 167 MG/DL (ref 70–99)
GLUCOSE BLDC GLUCOMTR-MCNC: 172 MG/DL (ref 70–99)
GLUCOSE SERPL-MCNC: 182 MG/DL (ref 70–99)
HCO3 BLDV-SCNC: 25 MMOL/L (ref 21–28)
HCT VFR BLD AUTO: 31.2 % (ref 35–47)
HGB BLD-MCNC: 10.3 G/DL (ref 11.7–15.7)
INTERPRETATION ECG - MUSE: NORMAL
MCH RBC QN AUTO: 30.4 PG (ref 26.5–33)
MCHC RBC AUTO-ENTMCNC: 33 G/DL (ref 31.5–36.5)
MCV RBC AUTO: 92 FL (ref 78–100)
O2/TOTAL GAS SETTING VFR VENT: 50 %
P JIROVECII DNA SPEC QL NAA+PROBE: DETECTED
PCO2 BLDV: 44 MM HG (ref 40–50)
PH BLDV: 7.36 PH (ref 7.32–7.43)
PLATELET # BLD AUTO: 166 10E9/L (ref 150–450)
PO2 BLDV: 44 MM HG (ref 25–47)
POTASSIUM SERPL-SCNC: 3.7 MMOL/L (ref 3.4–5.3)
PROT SERPL-MCNC: 5.3 G/DL (ref 6.8–8.8)
RBC # BLD AUTO: 3.39 10E12/L (ref 3.8–5.2)
SODIUM SERPL-SCNC: 136 MMOL/L (ref 133–144)
SPECIMEN SOURCE: ABNORMAL
SPECIMEN SOURCE: NORMAL
TACROLIMUS BLD-MCNC: 8.8 UG/L (ref 5–15)
TME LAST DOSE: NORMAL H
VANCOMYCIN SERPL-MCNC: 21.6 MG/L
WBC # BLD AUTO: 9.3 10E9/L (ref 4–11)

## 2021-01-28 PROCEDURE — 250N000011 HC RX IP 250 OP 636: Performed by: NURSE PRACTITIONER

## 2021-01-28 PROCEDURE — 250N000009 HC RX 250: Performed by: NURSE PRACTITIONER

## 2021-01-28 PROCEDURE — 76000 FLUOROSCOPY <1 HR PHYS/QHP: CPT | Mod: 26 | Performed by: RADIOLOGY

## 2021-01-28 PROCEDURE — 999N000157 HC STATISTIC RCP TIME EA 10 MIN

## 2021-01-28 PROCEDURE — P9041 ALBUMIN (HUMAN),5%, 50ML: HCPCS

## 2021-01-28 PROCEDURE — 250N000012 HC RX MED GY IP 250 OP 636 PS 637: Performed by: INTERNAL MEDICINE

## 2021-01-28 PROCEDURE — 250N000011 HC RX IP 250 OP 636: Performed by: INTERNAL MEDICINE

## 2021-01-28 PROCEDURE — 87449 NOS EACH ORGANISM AG IA: CPT | Performed by: INTERNAL MEDICINE

## 2021-01-28 PROCEDURE — 272N000079 HC NUTRITION PRODUCT RENAL BASIC LITER

## 2021-01-28 PROCEDURE — 250N000011 HC RX IP 250 OP 636: Performed by: CLINICAL NURSE SPECIALIST

## 2021-01-28 PROCEDURE — 250N000013 HC RX MED GY IP 250 OP 250 PS 637: Performed by: NURSE PRACTITIONER

## 2021-01-28 PROCEDURE — 99291 CRITICAL CARE FIRST HOUR: CPT | Mod: GC | Performed by: INTERNAL MEDICINE

## 2021-01-28 PROCEDURE — 80202 ASSAY OF VANCOMYCIN: CPT | Performed by: STUDENT IN AN ORGANIZED HEALTH CARE EDUCATION/TRAINING PROGRAM

## 2021-01-28 PROCEDURE — 43761 REPOSITION GASTROSTOMY TUBE: CPT | Performed by: RADIOLOGY

## 2021-01-28 PROCEDURE — 250N000009 HC RX 250: Performed by: INTERNAL MEDICINE

## 2021-01-28 PROCEDURE — 999N001017 HC STATISTIC GLUCOSE BY METER IP

## 2021-01-28 PROCEDURE — 94640 AIRWAY INHALATION TREATMENT: CPT | Mod: 76

## 2021-01-28 PROCEDURE — 250N000011 HC RX IP 250 OP 636

## 2021-01-28 PROCEDURE — 76000 FLUOROSCOPY <1 HR PHYS/QHP: CPT

## 2021-01-28 PROCEDURE — 94003 VENT MGMT INPAT SUBQ DAY: CPT

## 2021-01-28 PROCEDURE — 250N000012 HC RX MED GY IP 250 OP 636 PS 637: Performed by: NURSE PRACTITIONER

## 2021-01-28 PROCEDURE — 250N000013 HC RX MED GY IP 250 OP 250 PS 637: Performed by: STUDENT IN AN ORGANIZED HEALTH CARE EDUCATION/TRAINING PROGRAM

## 2021-01-28 PROCEDURE — 90935 HEMODIALYSIS ONE EVALUATION: CPT | Performed by: INTERNAL MEDICINE

## 2021-01-28 PROCEDURE — 94668 MNPJ CHEST WALL SBSQ: CPT

## 2021-01-28 PROCEDURE — 258N000003 HC RX IP 258 OP 636: Performed by: CLINICAL NURSE SPECIALIST

## 2021-01-28 PROCEDURE — 250N000009 HC RX 250: Performed by: CLINICAL NURSE SPECIALIST

## 2021-01-28 PROCEDURE — 200N000002 HC R&B ICU UMMC

## 2021-01-28 PROCEDURE — 250N000011 HC RX IP 250 OP 636: Performed by: STUDENT IN AN ORGANIZED HEALTH CARE EDUCATION/TRAINING PROGRAM

## 2021-01-28 PROCEDURE — 43761 REPOSITION GASTROSTOMY TUBE: CPT

## 2021-01-28 PROCEDURE — 99233 SBSQ HOSP IP/OBS HIGH 50: CPT | Performed by: INTERNAL MEDICINE

## 2021-01-28 PROCEDURE — 90937 HEMODIALYSIS REPEATED EVAL: CPT

## 2021-01-28 PROCEDURE — 85027 COMPLETE CBC AUTOMATED: CPT | Performed by: STUDENT IN AN ORGANIZED HEALTH CARE EDUCATION/TRAINING PROGRAM

## 2021-01-28 PROCEDURE — 258N000003 HC RX IP 258 OP 636: Performed by: NURSE PRACTITIONER

## 2021-01-28 PROCEDURE — 80053 COMPREHEN METABOLIC PANEL: CPT | Performed by: STUDENT IN AN ORGANIZED HEALTH CARE EDUCATION/TRAINING PROGRAM

## 2021-01-28 PROCEDURE — P9041 ALBUMIN (HUMAN),5%, 50ML: HCPCS | Performed by: CLINICAL NURSE SPECIALIST

## 2021-01-28 PROCEDURE — 634N000001 HC RX 634: Performed by: CLINICAL NURSE SPECIALIST

## 2021-01-28 PROCEDURE — 82803 BLOOD GASES ANY COMBINATION: CPT | Performed by: STUDENT IN AN ORGANIZED HEALTH CARE EDUCATION/TRAINING PROGRAM

## 2021-01-28 PROCEDURE — 94640 AIRWAY INHALATION TREATMENT: CPT

## 2021-01-28 PROCEDURE — 80197 ASSAY OF TACROLIMUS: CPT | Performed by: NURSE PRACTITIONER

## 2021-01-28 RX ORDER — PREDNISONE 20 MG/1
40 TABLET ORAL DAILY
Status: COMPLETED | OUTPATIENT
Start: 2021-02-02 | End: 2021-02-06

## 2021-01-28 RX ORDER — PREDNISONE 20 MG/1
20 TABLET ORAL DAILY
Status: COMPLETED | OUTPATIENT
Start: 2021-02-07 | End: 2021-02-17

## 2021-01-28 RX ORDER — ALBUMIN, HUMAN INJ 5% 5 %
100 SOLUTION INTRAVENOUS ONCE
Status: COMPLETED | OUTPATIENT
Start: 2021-01-28 | End: 2021-01-28

## 2021-01-28 RX ORDER — ALBUMIN, HUMAN INJ 5% 5 %
SOLUTION INTRAVENOUS
Status: COMPLETED
Start: 2021-01-28 | End: 2021-01-28

## 2021-01-28 RX ORDER — METOCLOPRAMIDE HYDROCHLORIDE 5 MG/ML
10 INJECTION INTRAMUSCULAR; INTRAVENOUS
Status: COMPLETED | OUTPATIENT
Start: 2021-01-28 | End: 2021-02-02

## 2021-01-28 RX ORDER — PREDNISONE 20 MG/1
40 TABLET ORAL 2 TIMES DAILY
Status: COMPLETED | OUTPATIENT
Start: 2021-01-28 | End: 2021-02-01

## 2021-01-28 RX ORDER — LIDOCAINE 40 MG/G
CREAM TOPICAL
Status: DISCONTINUED | OUTPATIENT
Start: 2021-01-28 | End: 2021-02-25 | Stop reason: HOSPADM

## 2021-01-28 RX ORDER — PREDNISONE 20 MG/1
40 TABLET ORAL DAILY
Status: DISCONTINUED | OUTPATIENT
Start: 2021-01-28 | End: 2021-01-28

## 2021-01-28 RX ORDER — ALBUMIN (HUMAN) 12.5 G/50ML
SOLUTION INTRAVENOUS
Status: DISCONTINUED
Start: 2021-01-28 | End: 2021-01-28 | Stop reason: HOSPADM

## 2021-01-28 RX ORDER — HEPARIN SODIUM 1000 [USP'U]/ML
500 INJECTION, SOLUTION INTRAVENOUS; SUBCUTANEOUS CONTINUOUS
Status: DISCONTINUED | OUTPATIENT
Start: 2021-01-28 | End: 2021-01-28

## 2021-01-28 RX ORDER — HEPARIN SODIUM 1000 [USP'U]/ML
500 INJECTION, SOLUTION INTRAVENOUS; SUBCUTANEOUS
Status: COMPLETED | OUTPATIENT
Start: 2021-01-28 | End: 2021-01-28

## 2021-01-28 RX ORDER — ALBUMIN (HUMAN) 12.5 G/50ML
25 SOLUTION INTRAVENOUS
Status: DISCONTINUED | OUTPATIENT
Start: 2021-01-28 | End: 2021-02-14

## 2021-01-28 RX ORDER — LIDOCAINE HYDROCHLORIDE 20 MG/ML
15 SOLUTION OROPHARYNGEAL ONCE
Status: COMPLETED | OUTPATIENT
Start: 2021-01-28 | End: 2021-01-28

## 2021-01-28 RX ADMIN — PIPERACILLIN AND TAZOBACTAM 2.25 G: 2; .25 INJECTION, POWDER, FOR SOLUTION INTRAVENOUS at 22:11

## 2021-01-28 RX ADMIN — INSULIN ASPART 2 UNITS: 100 INJECTION, SOLUTION INTRAVENOUS; SUBCUTANEOUS at 15:59

## 2021-01-28 RX ADMIN — EPOETIN ALFA-EPBX 4000 UNITS: 10000 INJECTION, SOLUTION INTRAVENOUS; SUBCUTANEOUS at 11:42

## 2021-01-28 RX ADMIN — ACETYLCYSTEINE 2 ML: 200 SOLUTION ORAL; RESPIRATORY (INHALATION) at 12:28

## 2021-01-28 RX ADMIN — IPRATROPIUM BROMIDE AND ALBUTEROL SULFATE 3 ML: .5; 3 SOLUTION RESPIRATORY (INHALATION) at 15:50

## 2021-01-28 RX ADMIN — Medication 5 ML: at 07:50

## 2021-01-28 RX ADMIN — SULFAMETHOXAZOLE AND TRIMETHOPRIM 295 MG: 200; 40 SUSPENSION ORAL at 07:50

## 2021-01-28 RX ADMIN — HEPARIN SODIUM 5000 UNITS: 5000 INJECTION, SOLUTION INTRAVENOUS; SUBCUTANEOUS at 00:14

## 2021-01-28 RX ADMIN — VALGANCICLOVIR HYDROCHLORIDE 450 MG: 50 POWDER, FOR SOLUTION ORAL at 20:07

## 2021-01-28 RX ADMIN — POLYETHYLENE GLYCOL 3350 17 G: 17 POWDER, FOR SOLUTION ORAL at 07:49

## 2021-01-28 RX ADMIN — TACROLIMUS 1 MG: 5 CAPSULE ORAL at 07:52

## 2021-01-28 RX ADMIN — IPRATROPIUM BROMIDE AND ALBUTEROL SULFATE 3 ML: .5; 3 SOLUTION RESPIRATORY (INHALATION) at 12:27

## 2021-01-28 RX ADMIN — IPRATROPIUM BROMIDE AND ALBUTEROL SULFATE 3 ML: .5; 3 SOLUTION RESPIRATORY (INHALATION) at 08:48

## 2021-01-28 RX ADMIN — PIPERACILLIN AND TAZOBACTAM 2.25 G: 2; .25 INJECTION, POWDER, FOR SOLUTION INTRAVENOUS at 00:14

## 2021-01-28 RX ADMIN — DOCUSATE SODIUM AND SENNOSIDES 2 TABLET: 8.6; 5 TABLET, FILM COATED ORAL at 07:49

## 2021-01-28 RX ADMIN — SODIUM CHLORIDE 250 ML: 9 INJECTION, SOLUTION INTRAVENOUS at 11:24

## 2021-01-28 RX ADMIN — PREDNISONE 40 MG: 20 TABLET ORAL at 20:07

## 2021-01-28 RX ADMIN — ACETYLCYSTEINE 2 ML: 200 SOLUTION ORAL; RESPIRATORY (INHALATION) at 08:48

## 2021-01-28 RX ADMIN — SODIUM CHLORIDE 300 ML: 9 INJECTION, SOLUTION INTRAVENOUS at 11:24

## 2021-01-28 RX ADMIN — INSULIN ASPART 1 UNITS: 100 INJECTION, SOLUTION INTRAVENOUS; SUBCUTANEOUS at 04:02

## 2021-01-28 RX ADMIN — LIDOCAINE HYDROCHLORIDE 0.5 ML: 10 INJECTION, SOLUTION EPIDURAL; INFILTRATION; INTRACAUDAL; PERINEURAL at 11:25

## 2021-01-28 RX ADMIN — HYDROCORTISONE SODIUM SUCCINATE 50 MG: 100 INJECTION, POWDER, FOR SOLUTION INTRAMUSCULAR; INTRAVENOUS at 07:49

## 2021-01-28 RX ADMIN — MIDAZOLAM 2 MG: 1 INJECTION INTRAMUSCULAR; INTRAVENOUS at 01:30

## 2021-01-28 RX ADMIN — BUDESONIDE 0.5 MG: 0.5 INHALANT ORAL at 20:11

## 2021-01-28 RX ADMIN — PIPERACILLIN AND TAZOBACTAM 2.25 G: 2; .25 INJECTION, POWDER, FOR SOLUTION INTRAVENOUS at 06:08

## 2021-01-28 RX ADMIN — INSULIN ASPART 1 UNITS: 100 INJECTION, SOLUTION INTRAVENOUS; SUBCUTANEOUS at 00:18

## 2021-01-28 RX ADMIN — TACROLIMUS 0.5 MG: 5 CAPSULE ORAL at 18:58

## 2021-01-28 RX ADMIN — SULFAMETHOXAZOLE AND TRIMETHOPRIM 295 MG: 200; 40 SUSPENSION ORAL at 20:07

## 2021-01-28 RX ADMIN — INSULIN ASPART 2 UNITS: 100 INJECTION, SOLUTION INTRAVENOUS; SUBCUTANEOUS at 12:31

## 2021-01-28 RX ADMIN — SODIUM CHLORIDE 300 MG: 9 INJECTION, SOLUTION INTRAVENOUS at 08:38

## 2021-01-28 RX ADMIN — HEPARIN SODIUM 5000 UNITS: 5000 INJECTION, SOLUTION INTRAVENOUS; SUBCUTANEOUS at 16:00

## 2021-01-28 RX ADMIN — PIPERACILLIN AND TAZOBACTAM 2.25 G: 2; .25 INJECTION, POWDER, FOR SOLUTION INTRAVENOUS at 16:00

## 2021-01-28 RX ADMIN — ACETYLCYSTEINE 2 ML: 200 SOLUTION ORAL; RESPIRATORY (INHALATION) at 20:10

## 2021-01-28 RX ADMIN — Medication 40 MG: at 07:51

## 2021-01-28 RX ADMIN — ACETYLCYSTEINE 2 ML: 200 SOLUTION ORAL; RESPIRATORY (INHALATION) at 15:50

## 2021-01-28 RX ADMIN — PREDNISONE 40 MG: 20 TABLET ORAL at 10:43

## 2021-01-28 RX ADMIN — LIDOCAINE HYDROCHLORIDE 5 ML: 20 SOLUTION ORAL; TOPICAL at 16:22

## 2021-01-28 RX ADMIN — HEPARIN SODIUM 500 UNITS: 1000 INJECTION INTRAVENOUS; SUBCUTANEOUS at 11:41

## 2021-01-28 RX ADMIN — HEPARIN SODIUM 5000 UNITS: 5000 INJECTION, SOLUTION INTRAVENOUS; SUBCUTANEOUS at 07:49

## 2021-01-28 RX ADMIN — HEPARIN SODIUM 500 UNITS/HR: 1000 INJECTION INTRAVENOUS; SUBCUTANEOUS at 11:25

## 2021-01-28 RX ADMIN — INSULIN ASPART 1 UNITS: 100 INJECTION, SOLUTION INTRAVENOUS; SUBCUTANEOUS at 20:09

## 2021-01-28 RX ADMIN — ALBUMIN HUMAN 100 ML: 0.05 INJECTION, SOLUTION INTRAVENOUS at 11:00

## 2021-01-28 RX ADMIN — ALBUMIN HUMAN 12.5 G: 0.05 INJECTION, SOLUTION INTRAVENOUS at 10:50

## 2021-01-28 RX ADMIN — IPRATROPIUM BROMIDE AND ALBUTEROL SULFATE 3 ML: .5; 3 SOLUTION RESPIRATORY (INHALATION) at 20:11

## 2021-01-28 RX ADMIN — INSULIN ASPART 2 UNITS: 100 INJECTION, SOLUTION INTRAVENOUS; SUBCUTANEOUS at 09:20

## 2021-01-28 RX ADMIN — BUDESONIDE 0.5 MG: 0.5 INHALANT ORAL at 08:48

## 2021-01-28 ASSESSMENT — ACTIVITIES OF DAILY LIVING (ADL)
ADLS_ACUITY_SCORE: 16

## 2021-01-28 ASSESSMENT — MIFFLIN-ST. JEOR: SCORE: 1190

## 2021-01-28 NOTE — PROGRESS NOTES
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 Nephrology consutled for dialysis needs.     Interval History :   Mrs Blue had day off of HD yesterday, was positive 1.3L, trying for 2-3L with run today which we lengthened to 4h to decrease the rate of fluid pull.  BFR ordered at 300ml/min as main goal is UF today with no issues with chemistries.  Using albumin prime as SBP's are already in 90's (and albumin is 1.8), would be open to using a pressor during run to achieve at least 2L.  Her access is technically difficult with fistula and graft on inner L arm, using lidocaine to pre-tret.       Assessment & Recommendations:   ESRD-Runs at [place] through [facility] group.  Has atypical HD schedule of Monday and Thursday, has AVF with partial graft which has been challenging to access per RN's.  Have avoided CRRT but did get reintubated so trying to aggressively treat any pulm edema portion of her resp failure although there are factors more suggestive of PNA as primary underlying issue.                 -HD today, main goal is UF so dropped BFR to 300, trying to pull 2-3L with albumin prime as SBP's are in 90's.                 -Access is L arm AVF/graft, somewhat challenging access.       Volume status-Some edema peripherally, was 1.3L net positive yesterday without run, had minimal UF on run 1/26 so trying to pull 2-3L today in longer run.       Electrolytes/pH-K 3.7, bicarb 22, HD today.      Ca/phos/pth-Ca 7.9, Mg and Phos mildly up.      Anemia-Hgb 10.3, acute management per team.       Nutrition-Nutren TF.       Seen and discussed  "with Dr Solis     Recommendations were communicated to primary team via verbal communication.        ADRIÁN Lo CNS  Clinical Nurse Specialist  [phone]      Review of Systems:   I reviewed the following systems:  Gen: No fevers or chills  CV: No CP at rest  Resp: No SOB at rest  GI: No N/V    Physical Exam:   I/O last 3 completed shifts:  In: 1439.28 [I.V.:739.28; NG/GT:360]  Out: -    BP 92/63   Pulse 96   Temp 97.8  F (36.6  C) (Axillary)   Resp 26   Ht 1.6 m (5' 2.99\")   Wt 64.1 kg (141 lb 5 oz)   LMP 06/07/2014 (Exact Date)   SpO2 93%   BMI 25.04 kg/m       GENERAL APPEARANCE: Reintubated, in no distress.    EYES: no scleral icterus  Endo: no moon facies  Pulmonary: Decreased BS  CV: regular rhythm, normal rate - Edema present  GI: soft, non distended  MS: no evidence of inflammation in joints, no muscle tenderness  :  Basilio present  SKIN: warm, dry,  NEURO: No focal deficits.      Labs:   All labs reviewed by me  Electrolytes/Renal -   Recent Labs   Lab Test 01/28/21 0412 01/27/21 0414 01/26/21 0425 01/25/21 0406    135 134 135   POTASSIUM 3.7 3.8 4.0 4.1   CHLORIDE 103 101 100 101   CO2 22 23 19* 16*   BUN 53* 44* 72* 58*   CR 3.77* 3.18* 4.45* 3.73*   * 172* 276* 187*   CHELSEY 7.9* 8.4* 8.0* 8.1*   MAG  --  2.2 2.8* 2.5*   PHOS  --  5.1* 7.0* 6.8*       CBC -   Recent Labs   Lab Test 01/28/21 0412 01/27/21 0414 01/26/21 0425   WBC 9.3 15.2* 16.9*   HGB 10.3* 10.8* 10.5*    231 252       LFTs -   Recent Labs   Lab Test 01/28/21 0412 01/27/21 0414 01/26/21 0425   ALKPHOS 164* 171* 184*   BILITOTAL 0.6 0.8 0.8   ALT 26 30 30   AST 10 15 11   PROTTOTAL 5.3* 6.0* 6.0*  6.0*   ALBUMIN 1.8* 2.2* 2.0*       Iron Panel -   Recent Labs   Lab Test 12/13/18  1033 08/01/18  0921 05/08/18  0709 05/08/18  0709   IRON 16* 93  --  48   IRONSAT 7* 37  --  18   YOLA 302* 571*   < >  --     < > = values in this interval not displayed.           Current Medications:    sodium " chloride 0.9%  250 mL Intravenous Once in dialysis     sodium chloride 0.9%  300 mL Hemodialysis Machine Once     acetylcysteine  2 mL Nebulization 4x Daily     albumin human         albumin human  100 mL Intravenous Once     albumin human         B and C vitamin Complex with folic acid  5 mL Per Feeding Tube Daily     budesonide  0.5 mg Nebulization BID     epoetin alisha-epbx (RETACRIT) inj ESRD  4,000 Units Intravenous Once in dialysis     gelatin absorbable  1 each Topical During Hemodialysis (from stock)     heparin (porcine)  500 Units Hemodialysis Machine Once in dialysis     heparin ANTICOAGULANT  5,000 Units Subcutaneous Q8H     insulin aspart  1-12 Units Subcutaneous Q4H     ipratropium - albuterol 0.5 mg/2.5 mg/3 mL  3 mL Nebulization 4x daily     pantoprazole  40 mg Oral QAM AC     piperacillin-tazobactam  2.25 g Intravenous Q6H     polyethylene glycol  17 g Oral or Feeding Tube Daily     posaconazole (NOXAFIL) intermittent infusion  300 mg Intravenous Daily     [Held by provider] predniSONE  2.5 mg Oral or Feeding Tube QPM     predniSONE  40 mg Oral BID     [Held by provider] predniSONE  5 mg Oral or Feeding Tube QAM     senna-docusate  2 tablet Oral or Feeding Tube BID     [START ON 2/3/2021] - MEDICATION INSTRUCTIONS -   Intravenous Once     study - dexmedetomidine (IDS 4963) (PRECEDEX) 600 mcg in 50 mL 0.9% NaCl (conc = 12 mcg/mL) PCS in CASSETTE   Intravenous Q2H     sulfamethoxazole-trimethoprim  295 mg Oral or Feeding Tube BID     tacrolimus  0.5 mg Oral or Feeding Tube QPM     tacrolimus  1 mg Oral or Feeding Tube QAM     valGANciclovir  450 mg Oral Once per day on Mon Thu       dextrose       heparin (porcine)       norepinephrine Stopped (01/26/21 1800)     - MEDICATION INSTRUCTIONS -

## 2021-01-28 NOTE — PROGRESS NOTES
HEMODIALYSIS TREATMENT NOTE    Date: 1/28/2021  Time: 3.30 PM    Data:  Pre Wt:64.1kg    Desired Wt: 61.1 kg   Post Wt:  61.1kg  Ultrafiltration - Post Run Net Total Removed: 3000 mL  Vascular Access Status: Fistula  patent  Dialyzer Rinse: Streaked. Light   Total Blood Volume Processed:82.8 L   Total Dialysis (Treatment) Time: 4.00hr   Dialysate Bath: K 3, Ca 3  Heparin 500 units loading + 500 units/hr    Lab:   No    Assessment / Interventions:4.0 hours HD via LAVF/ AVG ,  with good flow. Albumin 250 ML machine prime per order, Heparin dialysis given & Epogen was given. Hemodynamic stable throughout the treatment, b/p soft but stable. Completed her treatment time, 3.0kg UF well tolerated, blood rinsed back, Fistula hemostasis obtained in less than 10 minutes & hand off report given.     Plan:    Cont. With Renal Team.

## 2021-01-28 NOTE — PROGRESS NOTES
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 She was transferred to OSH ICU 1/22 and intubated and requiring vasopressors. 1/24 transferred to [facility] Medical ICU service for ongoing management. Outside chest CT showed bilateral infiltrates. Antibiotic treatment with vanc, zosyn, and bactrim (steno history). She was bronched with no specific growth on BAL ( with neutrophil predominance) however BAL PJP PCR was positive (returned 1/28), left diagnostic thoracentesis was done which returned exudative with inflammatory cells but no specific growth. Blood and urine cultures have remained negative. IgE was not elevated.  She was extubated on 1/26/2021, however, patient re-intubated overnight due to worsening O2 requirement. CXR with worsened consolidations and concern for possible edema. BNP was 28,000. 2D echo showed preserved EF. CT chest was concerning for alveolar and interstitial opacities. Ventilator liberation was likely unsuccessful due to fluid overload/pulmonary edema in the setting of underlying PNA. Will continue current antibiotics regimen. Will add steroids (prednisone 40 mg BID) for PJP treatment. Will check fungitell as per transplant team. Will try to pull off at least 2 L during HD session today. Plan for PST tomorrow, anticipate higher chance of ventilator liberation with fluid removal.    The patient was seen and examined with the resident/fellow physician.  We have discussed the patient in detail and I agree with the findings, assessment, and  plan as documented when this note was cosigned on this day. The plan was formulated in conjunction with pharmacy, ICU nurses, and respiratory therapist. I have evaluated all laboratory values and imaging studies for the past 24 hours. I have reviewed all the consults that have been ordered and are active for this patient.      Critical Care Time: 30 min.  I spent this time (excluding procedures) personally providing and directing critical care services at the bedside and on the critical care unit.      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 She was transferred to OSH ICU 1/22 and intubated and requiring vasopressors. 1/24 transferred to [facility] Medical ICU service for ongoing management.     CHANGES and MAJOR THINGS TODAY:   - Transitioned to precedex per trial protocol, patient seems comfortable  - Will reduce PEEP to 5, plan for PST today  - Rads to place post pyloric tube  - Plan for dialysis today to remove fluid  - PCP PCR positive  - Continue treatment with bactrim, will change steroids to PCP treatment prednisone taper  - Continue empiric antibiotic treatment with zosyn, stop vanco, will consider discontinuing zosyn 1/29    PLAN:     Neuro:  # Pain and sedation  - Transitioned to precedex per trial protocol, patient seems comfortable    # Insomnia  - Melatonin ordered      Pulmonary:  # Acute Hypoxic Respiratory Failure  Suspected 2/2 pneumonia. OSH CT 1/23 + bilateral  ground glass opacities and consolidative lung infiltrates centrally distributed to the upper lobes and RLL, . Covid-19 negative X3 at OSH. Patient re-intubated overnight due to worsening O2 requirement. CXR with worsened consolidations and concern for possible edema.  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  - Continue PTA tacrolimus  - Hold PTA prednisone while on stress steroids  - Pulmonary lung transplant team consulted, appreciate recs     Cardiovascular:  # Shock  # HX Hypertension  Patient was transferred on Levophed. Right heart catheterization 03/10/2020 concluded right sided filling pressures mildly elevated, mild PHTN, mild right sided filling pressures, and normal cardiac output. Last ECHO 10/21/20 with EF 60-65%, normal LV & RV function.   - Patient weaned off pressors 1/26 currently hemodynamically stable off pressors  - Hold PTA coreg and amlodipine      GI/Nutrition:  #Portal HTN  Liver biopsy positive for congestive hepatopathy. Previous had ascites thought to be r/t elevated right sided heart pressures. Last saw GI on 12/21/20 and patient endorsed that her ascites is no longer present.   - NTD     #Nutrition  - RD consulted for Tube Feeds  - Rads to place post pyloric tube    # Constipation  Patient reportedly has history of constipation with bactrim use, now stooling  - Scheduled miralax and  senna-doc ordered  - PRN dulcolax ordered     Renal/Fluids/Electrolytes:  # ESRD on iHD twice weekly (M/Th).   # Anion gap metabolic acidosis r/t ESRD, uremia (OSH ABG 7.31/37/120/19 & Anion Gap 25.7)   Outpatient dry body weight 56.5 kg. Ran dialysis 1/26  - Nephrology Consulted   - Plan for dialysis today to remove fluid  - I&O  - Daily Weights   - Trend BMP      Endocrine:  # Seronegative RA with Raynaud's   - NTD, monitor      High intensity sliding scale insulin ordered given steroids     ID:  # Sepsis   Suspected related to bacterial pneumonia. OSH CT 1/23 + bilateral ground glass opacities and consolidative lung infiltrates centrally distributed to the upper lobes and RLL. Covid-19 negative X3 at OSH. Procalcitonin negative. Urine strep, CMV, and RPP negative.  - PCP PCR positive  - Continue treatment with bactrim, will change steroids to PCP treatment prednisone taper  - Bronch completed, KOH and Gram not revealing,  with neutrophil predominance, other labs pending  - s/p thora 1/25, fluid exudative, micro pending  - F/U pending micro w/u   - Continue empiric antibiotic treatment with zosyn, stop vanco, will consider discontinuing zosyn 1/29      # Chronic/Stable right aspergillus empyema   - Continue PTA posaconazole, will monitor QTc     Hematology:    # Anemia r/t renal disease, chronic stable   # Thrombocytopenia r/t critical illness, resolved   # Leukocytosis, infection/steroids  Takes aranesp 25 mcg every four weeks, last dose 01/14.    - Daily CBC     Musculoskeletal:  - PT/OT when appropriate      Skin:  # Dermatomyositis antitryptase syndrome   - Noted, NTD     General Cares/Prophylaxis:    DVT Prophylaxis: Heparin SQ  GI Prophylaxis: PPI  Restraints: Not indicated  Family Communication:  updated at bedside  Code Status: Full      Lines/tubes/drains:  - Basilio  - CVC  - PIVs     Disposition:  - Medical ICU    Patient seen and findings/plan discussed with medical ICU staff,   Arjun Rolle    ====================================  INTERVAL HISTORY:   Overnight patient was transitioned to sedation with precedex PCA for trial purposes. No acute events overnight. Patient denies acute discomfort this AM. She denies any acute changes or concerns at this time.     OBJECTIVE:   1. VITAL SIGNS:   Temp:  [97.6  F (36.4  C)-97.9  F (36.6  C)] 97.8  F (36.6  C)  Pulse:  [] 93  Resp:  [24-29] 25  BP: ()/(45-90) 105/66  FiO2 (%):  [40 %-60 %] 50 %  SpO2:  [89 %-99 %] 93 %  Ventilation Mode: CMV/AC  (Continuous Mandatory Ventilation/ Assist Control)  FiO2 (%): 50 %  Rate Set (breaths/minute): 24 breaths/min  Tidal Volume Set (mL): 320 mL  PEEP (cm H2O): 5 cmH2O (found on)  Oxygen Concentration (%): 50 %  Resp: 25    2. INTAKE/ OUTPUT:   I/O last 3 completed shifts:  In: 1439.28 [I.V.:739.28; NG/GT:360]  Out: -     3. PHYSICAL EXAMINATION:  General: Laying in bed. No acute distress. Sedated and intubated  HEENT: PERRLA. Following commands. Responding appropriately. No focal deficits.  Neuro: A&Ox3, NAD  Pulm/Resp: Breath sounds coarse/dim throughout but improved  CV: RRR  Abdomen: Soft, non-distended, non-tender  Incisions/Skin: Left AV fistula with thrill and bruit    4. LABS:   Arterial Blood Gases   Recent Labs   Lab 01/27/21  0540 01/27/21  0316 01/27/21  0147 01/24/21  1529   PH 7.39 7.41 7.39 7.36   PCO2 41 38 40 29*   PO2 79* 59* 45* 177*   HCO3 25 24 24 16*     Complete Blood Count   Recent Labs   Lab 01/28/21  0412 01/27/21  0414 01/26/21  0425 01/25/21  0406   WBC 9.3 15.2* 16.9* 12.5*   HGB 10.3* 10.8* 10.5* 10.9*    231 252 167     Basic Metabolic Panel  Recent Labs   Lab 01/28/21  0412 01/27/21  0414 01/26/21  0425 01/25/21  0406    135 134 135   POTASSIUM 3.7 3.8 4.0 4.1   CHLORIDE 103 101 100 101   CO2 22 23 19* 16*   BUN 53* 44* 72* 58*   CR 3.77* 3.18* 4.45* 3.73*   * 172* 276* 187*     Liver Function Tests  Recent Labs   Lab 01/28/21  0412  01/27/21  0414 01/26/21  0425 01/24/21  1458   AST 10 15 11 31   ALT 26 30 30 35   ALKPHOS 164* 171* 184* 244*   BILITOTAL 0.6 0.8 0.8 1.2   ALBUMIN 1.8* 2.2* 2.0* 2.0*     Coagulation Profile  No lab results found in last 7 days.    5. RADIOLOGY:   Recent Results (from the past 24 hour(s))   CT Chest w/o Contrast    Narrative    Chest CT without contrast    INDICATION: Pneumonia, effusion or abscess suspected    COMPARISON: Outside chest CT 1/23/2021    FINDINGS: Diffuse patchy opacities in the lungs with groundglass  opacities are overall quite similar to slightly decreased in  appearance but still dominant. Lower lobe consolidative opacities are  actually increased.  The included thyroid appears normal. Median sternotomy. Rim  hyperdense/calcified possible abscess (series 2 image 33) measures  approximately 5.6 x 5.4 cm in the axial plane which is similar to  previous. Small right and left other pleural effusions noted.  Upper abdomen shows gallstones along with enteric tube progressing at  least into the stomach. No suspicious retrosternal fluid collections.  Mild body wall edema noted. No adrenal masses. No enlarged axillary,  mediastinal or hilar lymph nodes. Heart is overall slightly enlarged.  No pericardial effusion. The main pulmonary artery is normal in size,  as is the thoracic aorta at upper limits of normal in size. Small  amount of perihepatic ascites. Bones show degenerative disc changes in  the thoracic spine. No suspicious sclerotic or lytic/destructive  lesion.      Impression    IMPRESSION: No significant rim calcified fluid density in left lower  chest which may represent abscess. Other small bilateral pleural  effusions. Diffuse patchy consolidations in both lungs, slightly  decreased in the upper lobes and slightly increased in the lower  lobes. Minimal amount of perihepatic ascites. Cholelithiasis. Mild  thoracic spondylosis.    TERRI ISSA MD   XR Feeding Tube Placement    Narrative     Feeding tube placement.    Comparison: Abdominal x-ray dated 1/26/2021.    History: Feeding tube needed for nutrition. Unsuccessful attempt to  place feeding tube at bedside using Enteral access system (Cortrak)    Fluoroscopy time:  13.3 minute[s]    Technique: After injection of lidocaine gel into the right nostril,  attempted to advance previously placed cortrak feeding tube under  fluoroscopic guidance. Despite numerous attempts with multiple  guidewires, this was unsuccessful. Cortrak feeding tube was removed  and a new feeding tube was placed and advanced under fluoroscopic  guidance.    Findings: The feeding tube is advanced as far as possible. Despite  numerous attempts, the tip was unable to be advanced beyond the  pyloric sphincter. Feeding tube positioned with tip near the pylorus,  likely abutting the pyloric sphincter. A small amount of barium,  water, and air was injected to define anatomy. Guidewire was removed  and placed with plastic bag attached to patient whiteboard. Feeding  tube secured with nasal bridle and flushed with water.      Impression    Impression: Feeding tube placement with tip advanced as far as  possible; positioned with tip abutting the pyloric sphincter within  the gastric lumen. Due to upper limits of recommended fluoroscopy  time, further attempts at placement deferred until following day.  Feeding tube was secured in place with additional length of tube  within the gastric lumen in anticipation of duodenal advancement  secondary to peristalsis. May consider obtaining follow-up abdominal  x-ray in a.m. to reevaluate position.    I have personally reviewed the examination and initial interpretation  and I agree with the findings.    ANA CRISOSTOMO, DO

## 2021-01-28 NOTE — PHARMACY-VANCOMYCIN DOSING SERVICE
Pharmacy Vancomycin Note  Date of Service 2021  Patient's  1960   60 year old, female    Indication: Sepsis  Goal Trough Level: 15-20 mg/L  Day of Therapy: started OSH -   Current Vancomycin regimen:  Intermittent due to iHD    Current estimated CrCl =iHD    Vancomycin IV Administrations (past 72 hours)                   vancomycin (VANCOCIN) 1000 mg in dextrose 5% 200 mL PREMIX (mg) 1,000 mg New Bag 21 1751                Nephrotoxins and other renal medications (From now, onward)    Start     Dose/Rate Route Frequency Ordered Stop    21 0800  tacrolimus (GENERIC EQUIVALENT) suspension 1 mg      1 mg Oral or Feeding Tube EVERY MORNING. 21 1536      21 1800  tacrolimus (GENERIC EQUIVALENT) suspension 0.5 mg      0.5 mg Oral or Feeding Tube EVERY EVENING. 21 1443      21 1630  piperacillin-tazobactam (ZOSYN) 2.25 g vial to attach to  ml bag      2.25 g  over 30 Minutes Intravenous EVERY 6 HOURS 21 1443      21 1500  norepinephrine (LEVOPHED) 16 mg in  mL infusion CENTRAL LINE      0.03-0.4 mcg/kg/min × 58.6 kg (Dosing Weight)  1.6-22 mL/hr  Intravenous CONTINUOUS 21 1443               Contrast Orders - past 72 hours (72h ago, onward)    Start     Dose/Rate Route Frequency Ordered Stop    21 1630  barium sulfate (EZ PAQUE) oral suspension 96% 1 mL      1 mL Oral ONCE 21 1603 21 1643          Interpretation of levels and current regimen:  Trough level is  Therapeutic  Urine output:  anuric  Renal Function: iHD    Plan:  1.  Vancomycin discontinued today. Patient will recieve iHD today.     Dyana Case, PharmD  [facility] Pharmacist  [phone]  [phone]          .

## 2021-01-28 NOTE — PROGRESS NOTES
Nephrology Dialysis Note    This patient was seen and examined while on dialysis. Laboratory results and nurses' notes were reviewed.    Increased duration of HD today to optimize UF. Otherwise, no changes to management of anemia, BMD, acidosis, or electrolytes. Additional management recommendations per Hardy CHAVEZ, please see his note for further details.    Diagnosis - ESRD    HOLLI Solis MD  [phone]

## 2021-01-28 NOTE — PROGRESS NOTES
Antimicrobial Stewardship Team Note    Antimicrobial Stewardship Program - A joint venture between [facility] and  Physicians to optimize antibiotic management.  NOT a formal consult - Restricted Antimicrobial Review     Patient: Kecia Blue  MRN: [id number]  Allergies: Patient has no known allergies.    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 She was transferred to OSH ICU 1/22 and was eventually intubated and requiring vasopressors. On 1/24 she was transferred to the MICU at [facility] for ongoing management.     HPI: Patient initially presented to an OSH ED due to SOB with sudden hypoxemia while at dialysis on 1/22. Patient reported having increased SOB, headache, fatigue, low-grade fever (99.8 F), and requiring 4L O2. She has a baseline cough, but she reports coughing up clear phlegm when she sits up. Reports her SOB worsens while laying down. Patient tested negative for COVID, however her CXR showed possible multifocal pneumonia, as well as chronic right pleural thickening or effusion. The OSH discussed this case with the Transplant Team at the [facility]. As that time, there were no beds available and recommended that the patient be admitted, obtain a pulmonary consult and start antibiotics including Zosyn and azithromycin. Upon physical examination, she was normotensive (109/66), slightly tachycardic (), febrile (99.8 F), RR 24, SPO2 90%. Patient was noted to have signs of labored breathing, decreased breath sounds on right lung base. Crackles bilaterally. WBC 6.5, Procalcitonin 0.16, CRP 3.78.      Unfortunately, over the weekend patient began to decompensate. On 1/23 She was noted to be sating in the mid to low 80s, and at this time her 4L of O2 via nasal canula was increased to 15L via non-rebreather to maintain O2 saturations at 90% or greater and was able to get O2 saturation to sustain around 92% and patient reported feeling much better. Later that night, despite being on 15L via non-rebreather, patient's O2 saturation continued to drop into the high 80s. Patient was working hard to breathe, panting, and very short of breath. She was subsequently transferred to the ICU at OSH and was ultimately intubated. Patient was transferred to the [facility] the morning of 1/24 for ongoing management.     Upon admission on 1/24 patient's antibiotics were broadened to Vancomycin plus Zosyn for sepsis secondary to suspected bacterial pneumonia, with the addition of Bactrim due to her recent history of growing stenotrophomonas in a BAL culture from 12/23/20. Underwent bronchoscopy which showed mild right stenosis, extensive thick secretion in the right middle lobe and right lower lobe. Patient was noted to have extensive thick secretions that were difficult to suction clear. No specific growth on BAL ( with neutrophil predominance), left diagnostic thoracentesis was done which returned as exudative with inflammatory cells but no specific growth. Blood and urine cultures have remained negative. Patient was extubated on 1/26, however, she was re-intubated overnight due to worsening O2 requirements. Repeat CXR was consistent with worsening consolidations and concern for possible edema. As of today, the patient is afebrile (97.8 F), WBC 9.3 (previously 20.5), however, PJP PCR positive from BAL . Vancomycin was discontinued this morning, with a plan to discontinue Zosyn tomorrow, 1/29.     OSH BCx are negative, Blastomyces antibody negative, aspergillus antigen negative, positive 1, 3 Beta-D-glucan.   Repeat blood  culture negative, urine culture negative, pleural fluid culture NGTD.   COVID negative, respiratory viral panel negative, fungus blood culture NGTD          Active Anti-infective Medications   (From admission, onward)                 Start     Stop    01/24/21 2000  sulfamethoxazole-trimethoprim  295 mg,   Oral or Feeding Tube,   2 TIMES DAILY     steno        --    01/24/21 1630  piperacillin-tazobactam  2.25 g,   Intravenous,   EVERY 6 HOURS     Sepsis        --    01/24/21 1528  Vancomycin Place [facility] - Receiving Intermittent Dosing  1 each,   Does not apply,   SEE ADMIN INSTRUCTIONS     Sepsis        --    01/24/21 1517  posaconazole (NOXAFIL) injection  300 mg,   Intravenous,   DAILY     Invasive Aspergillosis        --                  Assessment: Acute hypoxemic respiratory failure   Kecia Blue is a 60-year-old female who transferred from OSH with acute hypoxemic respiratory failure of unclear source. She is continued on empiric Zosyn, vancomycin, and Bactrim for for sepsis 2/2 suspected bacterial pneumonia. Patient also has history of aspergillus empyema with recent Aspergillus fumigatus lung infection for which she remains on posaconazole followed by transplant ID outpatient. OSH CT chest with bilateral ground-glass and nodular consolidative lung infiltrates c/f for atypical pneumonia or opportunistic infection. Microbiological workup at OSH and here remain negative for bacteria at this time. Patient remains intubated but has been afebrile, off pressors x48 hours, and WBC trending down. PJP PCR positive, which may be the likely source of pulmonary infection in addition to other potential causes. Therefore, recommend obtaining a Transplant ID Consult for further workup and management given the complexity of this patient's infectious history and presentation.     Recommendations:  Obtain Transplant ID Consult     Discussed with ID Staff  Adrianna Zamarripa MD and Deann Carbajal, PharmD, BCIDP    Ruby Bai,  PharmD  PGY-1 Pharmacy Resident   AST Pager: [phone]     Vital Signs/Clinical Features:  Vitals         01/26 0700  -  01/27 0659 01/27 0700  -  01/28 0659 01/28 0700  -  01/28 1347   Most Recent    Temp ( F) 96.8 -  98.2    97.6 -  97.9    97.5 -  98     98 (36.7)    Pulse 65 -  112    70 -  100    76 -  101     99    Resp 17 -  24 24 -  29 25 - 28     25    BP 81/60 -  114/70    72/45 -  114/83    92/63 -  117/68     109/70    SpO2 (%) 84 -  100    89 -  99    90 -  99     94            Labs  Estimated Creatinine Clearance: 14.7 mL/min (A) (based on SCr of 3.77 mg/dL (H)).  Recent Labs   Lab Test 12/23/20  0838 01/24/21  1458 01/25/21  0406 01/26/21  0425 01/27/21  0414 01/28/21  0412   CR 3.56* 3.39* 3.73* 4.45* 3.18* 3.77*       Recent Labs   Lab Test 10/29/19  0617 10/29/19  0617 12/18/19  0854 12/18/19  0854 01/08/20  0930 02/19/20  0713 02/19/20  0713 03/10/20  0954 03/10/20  0954 09/09/20  0822 09/09/20  0822 12/23/20  0838 01/24/21  1458 01/25/21  0406 01/26/21  0425 01/27/21  0414 01/28/21  0412   WBC 5.3   < > 6.5   < > 5.6 6.7   < > 6.3   < > 13.1*   < > 5.5 20.5* 12.5* 16.9* 15.2* 9.3   ANEU 4.1  --  4.8  --  3.9 5.0  --  5.0  --  10.7*  --   --   --   --   --   --   --    ALYM 0.5*  --  0.8  --  0.7* 0.9  --  0.5*  --  0.9  --   --   --   --   --   --   --    SHERRI 0.6  --  0.8  --  0.8 0.7  --  0.6  --  1.2  --   --   --   --   --   --   --    AEOS 0.1  --  0.1  --  0.0 0.1  --  0.1  --  0.2  --   --   --   --   --   --   --    HGB 8.4*   < > 10.1*   < > 9.8* 11.8   < > 10.3*   < > 11.9   < > 12.4 11.1* 10.9* 10.5* 10.8* 10.3*   HCT 28.0*   < > 32.9*   < > 31.5* 38.2   < > 33.1*   < > 38.4   < > 39.2 34.6* 33.4* 29.7* 32.1* 31.2*   MCV 94   < > 99   < > 100 101*   < > 100   < > 99   < > 97 95 93 86 90 92   PLT 61*   < > 86*   < > 119* 134*   < > 113*   < > 261   < > 149* 192 167 252 231 166    < > = values in this interval not displayed.       Recent Labs   Lab Test 10/19/20 12/09/20  0721  01/24/21  1458 01/26/21  0425 01/27/21  0414 01/28/21  0412   BILITOTAL 0.9 0.8 1.2 0.8 0.8 0.6   ALKPHOS 614 333* 244* 184* 171* 164*   ALBUMIN 3.0 2.9* 2.0* 2.0* 2.2* 1.8*   AST 25 28 31 11 15 10   ALT 35 55* 35 30 30 26       Recent Labs   Lab Test 02/27/18  0537 02/27/18  0537 03/04/18  1348 03/04/18  1603 03/04/18  1953 08/22/19  0820 09/11/19  2325 09/13/19  0934 09/14/19  0533 10/06/19  1444 10/07/19  1221 10/21/19  1459 01/24/21  1529   PCAL <0.05  --   --   --   --   --   --   --  0.23 0.08  --   --   --    LACT  --    < > 0.9 0.8 0.8  --  1.0  --   --   --   --  0.5* 0.7   CRP  --   --   --   --   --  47.0* 66.0* 58.0*  --  25.0*  --   --   --    SED  --   --   --   --   --   --  102* 111*  --   --  93*  --   --     < > = values in this interval not displayed.       Recent Labs   Lab Test 03/19/20  1032 03/19/20  1032 01/28/21  0412 01/28/21  0618   VANCOMYCIN  --    < > 21.6  --    VCON 1.4  --   --   --    TACROL 10.3   < >  --  8.8    < > = values in this interval not displayed.       Culture Results:  7-Day Micro Results       Procedure Component Value Units Date/Time    1,3 Beta D glucan fungitell [[id number]] Collected: 01/28/21 1232    Order Status: Completed Lab Status: In process Updated: 01/28/21 1255    Specimen: Blood     Cell count with differential fluid [[id number]] Collected: 01/25/21 1347    Order Status: Completed Lab Status: Final result Updated: 01/25/21 2028    Specimen: Pleural fluid      Body Fluid Analysis Source Pleural fluid     % Neutrophils Fluid 87 %      % Mono/Macro Fluid 13 %      Color Fluid Yellow     Appearance Fluid Cloudy     WBC Fluid 660 /uL      Comment: No reference ranges have been established.  This result should be interpreted   in the context of the patient's clinical condition and compared to   simultaneous measurement in the patient's blood.  Refer to Lab Guide for   specific interpretive guidelines.         Fluid Culture Aerobic Bacterial [[id number]] Collected: 01/25/21  1347    Order Status: Completed Lab Status: Preliminary result Updated: 01/26/21 1329    Specimen: Pleural fluid      Specimen Description Pleural fluid     Culture Micro Culture negative monitoring continues    Glucose fluid [[id number]] Collected: 01/25/21 1347    Order Status: Completed Lab Status: Final result Updated: 01/25/21 1714    Specimen: Pleural fluid      Glucose Fluid Source Pleural fluid     Glucose Fluid 72 mg/dL      Comment: No reference ranges have been established.  This result should be interpreted   in the context of the patient's clinical condition and compared to   simultaneous measurement in the patient's blood.  Refer to Lab Guide for   specific interpretive guidelines.         Gram stain [[id number]] Collected: 01/25/21 1347    Order Status: Completed Lab Status: Final result Updated: 01/25/21 2013    Specimen: Pleural fluid      Specimen Description Pleural fluid     Gram Stain No organisms seen      Moderate  WBC'S seen  predominantly mononuclear cells      Lactate dehydrogenase fluid [[id number]] Collected: 01/25/21 1347    Order Status: Completed Lab Status: Final result Updated: 01/25/21 1850    Specimen: Pleural fluid      LD Fluid Source Pleural fluid     Lactate Dehydrogenase Fluid 6,308 U/L      Comment: No reference ranges have been established.  This result should be interpreted   in the context of the patient's clinical condition and compared to   simultaneous measurement in the patient's blood.  Refer to Lab Guide for   specific interpretive guidelines.         Protein fluid [[id number]] Collected: 01/25/21 1347    Order Status: Completed Lab Status: Final result Updated: 01/25/21 1719    Specimen: Pleural fluid      Protein Total Fluid Source Pleural fluid     Protein Total Fluid 3.4 g/dL      Comment: No reference ranges have been established.  This result should be interpreted   in the context of the patient's clinical condition and compared to   simultaneous measurement in the patient's  blood.  Refer to Lab Guide for   specific interpretive guidelines.         AFB Culture Non Blood [[id number]] Collected: 01/25/21 1347    Order Status: Completed Lab Status: Preliminary result Updated: 01/28/21 0720    Specimen: Pleural fluid      Specimen Description Pleural fluid     Culture Micro Culture received and in progress.  Positive AFB results are called as soon as detected.    Final report to follow in 7 to 8 weeks.        Assayed at [facility] [address] [place] [address] [phone]    AFB Stain Non Blood [[id number]] Collected: 01/25/21 1347    Order Status: Completed Lab Status: Final result Updated: 01/28/21 0720    Specimen: Pleural fluid      Specimen Description Pleural fluid     AFB Stain Negative for acid fast bacteria      Less than 5ml of specimen received.  A minimum of 5 mL of sputum or fluid is recommended for recovery of acid fast bacilli   (AFB).  Volumes less than 5 mL are suboptimal and may compromise recovery of AFB from   culture.        Assayed at [facility] [address] [place] [address] [phone]    Anaerobic bacterial culture [[id number]] Collected: 01/25/21 1347    Order Status: Completed Lab Status: Preliminary result Updated: 01/26/21 0937    Specimen: Pleural fluid      Specimen Description Pleural fluid     Special Requests Not received in an anaerobic transport container.     Culture Micro Culture negative monitoring continues    Fungus Culture, non-blood [[id number]] Collected: 01/25/21 1347    Order Status: Completed Lab Status: Preliminary result Updated: 01/27/21 1019    Specimen: Pleural fluid      Specimen Description Pleural fluid     Culture Micro Culture negative after 2 days    Blood culture [[id number]] Collected: 01/24/21 1841    Order Status: Completed Lab Status: Preliminary result Updated: 01/28/21 0736    Specimen: Blood      Specimen Description Blood Right Hand     Culture Micro No growth after 4 days    Fungus culture blood [[id number]]  Collected: 01/24/21 1830    Order Status: Completed Lab Status: Preliminary result Updated: 01/27/21 1019    Specimen: Blood      Specimen Description Blood     Special Requests SPS     Culture Micro No growth after 3 days    Respiratory Panel PCR - NP Swab [[id number]] Collected: 01/24/21 1822    Order Status: Completed Lab Status: Final result Updated: 01/24/21 2058    Specimen: Nasopharyngeal swab      Adenovirus Not Detected     Coronavirus Not Detected     Comment: This test detects Coronavirus 229E, HKU1, NL63, and OC43 but does not   distinguish between them. It does not detect MERS ( Respiratory   Syndrome), SARS (Severe Acute Respiratory Syndrome) or 2019-nCoV (2019 Novel)   Coronavirus.          Human Metapneumovirus Not Detected     Human Rhinovirus/Enterovirus Not Detected     Influenza A Not Detected     Influenza A, H1 Not Detected     Influenza A 2009 H1N1 Not Detected     Influenza A, H3 Not Detected     Influenza B Not Detected     Parainfluenza Virus 1 Not Detected     Parainfluenza Virus 2 Not Detected     Parainfluenza Virus 3 Not Detected     Parainfluenza Virus 4 Not Detected     Respiratory Syncytial Virus A Not Detected     Respiratory Syncytial Virus B Not Detected     Chlamydia pneumoniae Not Detected     Mycoplasma pneumoniae Not Detected     Respiratory Virus Comment See comment below     Comment:    The [employer] Respiratory Viral Panel is a qualitative nucleic acid, multiplex, in   vitro diagnostic test for the simultaneous detection and identification of   multiple respiratory viral and bacterial nucleic acids in nasopharyngeal swabs   collected in viral transport media from individuals exhibiting signs and   symptoms of respiratory infection.  The test detects Adenovirus, Coronavirus, Human Metapneumovirus, Human   Rhinovirus/Enterovirus, Influenza A (H1, H3, 2009 H1N1), Influenza B,   Respiratory Syncytial Virus A, Respiratory Syncytial Virus B, Parainfluenza   Virus (1, 2, 3,  4), Chlamydia pneumoniae and Mycoplasma pneumoniae.  The assay has received FDA approval for the testing of nasopharyngeal (NP)   swabs only. This test has been verified and is performed by the Infectious   Diseases Diagnostic Laboratory at [facility]. This test is used for   clinical purposes and should not be regarded as investigational or for   research.  This laboratory is certified under the Clinical Laboratory Improvement   Amendments of 1988 (CLIA-88) as qualified to perform high complexity clinical   laboratory testing.         Strep pneumo Agn Urine or CSF [[id number]] Collected: 01/24/21 1729    Order Status: Completed Lab Status: Final result Updated: 01/24/21 2121    Specimen: Catheterized Urine      Specimen Description Catheterized Urine     S Pneumoniae Antigen Negative, no Streptococcus pneumoniae antigen detected by immunochromatographic membrane   assay. A negative Streptococcus pneumoniae antigen result does not rule out infection with   Streptococcus pneumoniae.      Histoplasma Capsulatum Agn Non Blood [[id number]] Collected: 01/24/21 1729    Order Status: Completed Lab Status: Final result Updated: 01/27/21 0911    Specimen: Urine catheter      Lab Scanned Result HISTOPLASMA AGN NONBLD-Scanned    Urine Culture Aerobic Bacterial [[id number]] Collected: 01/24/21 1729    Order Status: Completed Lab Status: Final result Updated: 01/25/21 1634    Specimen: Catheterized Urine      Specimen Description Catheterized Urine     Culture Micro No growth    Fungus culture     Order Status: Canceled Lab Status: No result     Specimen: Blood     Fungus culture     Order Status: Canceled Lab Status: No result     Specimen: Blood     Blood culture [[id number]] Collected: 01/24/21 1652    Order Status: Completed Lab Status: Preliminary result Updated: 01/28/21 0736    Specimen: Blood      Specimen Description Blood Right Hand     Culture Micro No growth after 4 days    Cell count with differential fluid - BAL Site 1  [[id number]] Collected: 01/24/21 1629    Order Status: Completed Lab Status: Final result Updated: 01/24/21 2230    Specimen: Bronchial Alveolar Lavage      Body Fluid Analysis Source Bronchoalveolar Lavage     % Neutrophils Fluid 81 %      % Lymphocytes Fluid 5 %      % Eosinophils Fluid 3 %      % Other Cells Fluid 11 %      Comment: Other cells are Monocytes, Macrophages,and Epithelial cells.        Color Fluid Pink     Appearance Fluid Slightly Cloudy     WBC Fluid 355 /uL      Comment: No reference ranges have been established.  This result should be interpreted   in the context of the patient's clinical condition and compared to   simultaneous measurement in the patient's blood.  Refer to Lab Guide for   specific interpretive guidelines.         Bronchial Culture Aerobic Bacterial - BAL Site 1 [[id number]] Collected: 01/24/21 1629    Order Status: Completed Lab Status: Final result Updated: 01/26/21 0747    Specimen: Bronchial Alveolar Lavage      Specimen Description Bronchoalveolar Lavage     Culture Micro No growth    Gram stain - BAL Site 1 [[id number]] Collected: 01/24/21 1629    Order Status: Completed Lab Status: Final result Updated: 01/24/21 2002    Specimen: Bronchial Alveolar Lavage      Specimen Description Bronchoalveolar Lavage     Gram Stain >25 PMNs/low power field      No organisms seen    AFB Culture Non Blood - BAL site 1 [[id number]] Collected: 01/24/21 1629    Order Status: Completed Lab Status: Preliminary result Updated: 01/27/21 1048    Specimen: Bronchial Alveolar Lavage      Specimen Description Bronchoalveolar Lavage     Culture Micro Culture received and in progress.  Positive AFB results are called as soon as detected.    Final report to follow in 7 to 8 weeks.        Assayed at [facility] [address] [place] [address] [phone]    AFB Stain Non Blood - BAL Site 1 [[id number]] Collected: 01/24/21 1629    Order Status: Completed Lab Status: Final result Updated: 01/27/21 1048     Specimen: Bronchial Alveolar Lavage      Specimen Description Bronchoalveolar Lavage     AFB Stain Negative for acid fast bacteria  Less than 5ml of specimen received.  A minimum of 5 mL of sputum or fluid is recommended for recovery of acid fast bacilli   (AFB).  Volumes less than 5 mL are suboptimal and may compromise recovery of AFB from   culture.        Assayed at [facility] [address] [place] [address] [phone]    Fungus Culture, non-blood - BAL Site 1 [[id number]] Collected: 01/24/21 1629    Order Status: Completed Lab Status: Preliminary result Updated: 01/27/21 1019    Specimen: Bronchial Alveolar Lavage      Specimen Description Bronchoalveolar Lavage     Culture Micro Culture negative after 3 days    Koh prep - BAL Site 1 [[id number]] Collected: 01/24/21 1629    Order Status: Completed Lab Status: Final result Updated: 01/24/21 2002    Specimen: Bronchial Alveolar Lavage      Specimen Description Bronchoalveolar Lavage     KOH Prep No fungal elements seen    Legionella culture - BAL Site 1 [[id number]] Collected: 01/24/21 1629    Order Status: Completed Lab Status: Preliminary result Updated: 01/25/21 0844    Specimen: Bronchial Alveolar Lavage      Specimen Description Bronchoalveolar Lavage     Culture Micro Culture negative monitoring continues    Respiratory Viral Panel PCR - Bronch Wash - BAL Site 1 [[id number]] Collected: 01/24/21 1629    Order Status: Completed Lab Status: Final result Updated: 01/25/21 1318    Specimen: Bronchial lavage      Respiratory Virus Source Bronchial     Influenza A Negative     Influenza A, H1 Negative     Influenza A, H3 Negative     Influenza A 2009 H1N1 Negative     Influenza B Negative     Respiratory Syncytial Virus A Negative     Respiratory Syncytial Virus B Negative     Parainfluenza Virus 1 Negative     Parainfluenza Virus 2 Negative     Parainfluenza Virus 3 Negative     Human Metapneumovirus Negative     Human Rhinovirus Negative     Adenovirus Species B/E  Negative     Adenovirus Species C Negative     Respiratory Virus Comment --     The [employer] Respiratory Viral Panel (RVP) is a qualitative, multiplex, diagnostic test for   simultaneous detection and identification of multiple respiratory viral nucleic acids in   individuals exhibiting signs and symptoms of respiratory infection. It uses innovative   eSensor technology to provide sensitive and specific respiratory virus detection.       Comment: The test detects Influenza A (H1 and H3), Influenza A 2009 H1N1, Influenza B,   Respiratory Syncytial Virus A, Respiratory Syncytial Virus B, Parainfluenza   Virus (1, 2 and 3), Human Metapneumovirus, Human Rhinovirus (HRV), Adenovirus   B/E and Adenovirus C.  The Infectious Diseases Diagnostic Laboratory at [facility] has   validated the performance characteristics of the [employer] Respiratory Viral   Panel for NP swabs, bronchial alveolar lavage/wash, nasopharyngeal   aspirate/wash and nasal wash. This test is used for clinical purposes and   should not be regarded as investigational or for research.  This laboratory is certified under the Clinical Laboratory Improvement   Amendments of 1988 (CLIA-88) as qualified to perform high complexity clinical   laboratory testing.         Nocardia culture - BAL Site 1 [[id number]] Collected: 01/24/21 1629    Order Status: Completed Lab Status: Preliminary result Updated: 01/27/21 1020    Specimen: Bronchial Alveolar Lavage      Specimen Description Bronchoalveolar Lavage     Culture Micro No growth after 3 days    Pneumocystis jirovecil by PCR - BAL Site 1 [[id number]]  (Abnormal) Collected: 01/24/21 1629    Order Status: Completed Lab Status: Final result Updated: 01/28/21 0716    Specimen: Bronchial Alveolar Lavage      P. jirovecii Specimen Source Bronchoalveolar Lavage     P. jirovecii By PCR Detected     Comment: (Note)  DETECTED - P. jirovecii DNA detected in this specimen.  Results should be used to aid in the diagnosis of  PCP  pneumonia and must be interpreted in the context of host  risk factors, clinical presentation, and radiographic  imaging.  TEST INFORMATION: Pneumocystis jirovecii Detection by PCR  Test developed and characteristics determined by [facility]. See Compliance Statement B: [facility]/CS  Performed by [facility],  [address] [place] [address] [phone]  [email or web address], Carri Red MD, Lab. Director         Blood culture     Order Status: Canceled Lab Status: No result     Specimen: Blood     Blood culture     Order Status: Canceled Lab Status: No result     Specimen: Blood             Recent Labs   Lab Test 18  1425 19  1512 19  0125 10/21/19  2240 21  1729   URINEPH 5.5 7.0 7.5* 5.0 5.0   NITRITE Negative Negative Negative Negative Negative   LEUKEST Negative Trace* Negative Large* Moderate*   WBCU 5 19* 3 115* 34*             Recent Labs   Lab Test 21  1629 21  1822   RVSPEC Bronchial  --    IFLUA Negative Not Detected   FLUAH1 Negative Not Detected   FLUAH3 Negative Not Detected   WQ3346 Negative Not Detected   IFLUB Negative Not Detected   RSVA Negative Not Detected   RSVB Negative Not Detected   PIV1 Negative Not Detected   PIV2 Negative Not Detected   PIV3 Negative Not Detected   HMPV Negative Not Detected   HRVS Negative  --    ADVBE Negative  --    ADVC Negative  --        Recent Labs   Lab Test 18  1500   CDBPCT Negative       Imaging: Echo Complete    Result Date: 2021  [id number]^ISABELLA^CHRISTINE    [facility] [address] [place] [id number]  Name: SARAI KILLIAN MRN: [id number] : 1960 Study Date: 2021 01:55 PM Age: 60 yrs Gender: Female Patient Location: [facility] Reason For Study: Shock Ordering Physician: CHRISTINE MASON Performed By: Maya Chin RDCS  BSA: 1.6 m2 Height: 62 in Weight: 129 lb HR: 74 BP: 79/52 mmHg  _____________________________________________________________________________ __   Procedure Complete Portable Echo Adult. _____________________________________________________________________________ __   Interpretation Summary Global and regional left ventricular function is normal with an EF of 60-65%. Right ventricular function, chamber size, wall motion, and thickness are normal. IVC diameter >2.1 cm collapsing <50% with sniff suggests a high RA pressure estimated at 15 mmHg or greater. _____________________________________________________________________________ __   Left Ventricle Global and regional left ventricular function is normal with an EF of 60-65%. Left ventricular wall thickness is normal. Left ventricular size is normal. Left ventricular diastolic function is normal. No regional wall motion abnormalities are seen.  Right Ventricle Right ventricular function, chamber size, wall motion, and thickness are normal.  Atria The right atria appears normal. Moderate left atrial enlargement is present.  Mitral Valve The mitral valve is normal. Trace mitral insufficiency is present.   Aortic Valve The valve leaflets are not well visualized. On Doppler interrogation, there is no significant stenosis or regurgitation.  Tricuspid Valve Mild to moderate tricuspid insufficiency is present. The right ventricular systolic pressure is approximated at 24.6 mmHg plus the right atrial pressure.  Pulmonic Valve The pulmonic valve is normal.  Vessels The aorta root is normal. The pulmonary artery and bifurcation cannot be assessed. IVC diameter >2.1 cm collapsing <50% with sniff suggests a high RA pressure estimated at 15 mmHg or greater.  Pericardium No pericardial effusion is present.  _____________________________________________________________________________ __ MMode/2D Measurements & Calculations IVSd: 0.86 cm LVIDd: 4.9 cm LVIDs: 3.0 cm LVPWd: 0.86 cm FS: 39.4 %  LV mass(C)d: 146.0 grams LV mass(C)dI: 92.0  grams/m2 asc Aorta Diam: 2.4 cm LVOT diam: 2.1 cm LVOT area: 3.4 cm2 LA Volume Index (BP): 45.3 ml/m2 RWT: 0.35 TAPSE: 1.9 cm   Doppler Measurements & Calculations MV E max herberth: 84.5 cm/sec MV A max herberth: 62.5 cm/sec MV E/A: 1.4 MV dec slope: 502.5 cm/sec2 MV dec time: 0.17 sec  PA acc time: 0.10 sec TR max herberth: 248.0 cm/sec TR max P.6 mmHg E/E' avg: 10.1 Lateral E/e': 9.3 Medial E/e': 10.9  _____________________________________________________________________________ __    Report approved by: Glen Zhang 2021 02:40 PM      Xr Chest Port 1 View    Result Date: 2021  Exam: XR CHEST PORT 1 VW, 2021 1:54 AM Indication: increased oxygen requirements Comparison: 2021 chest x-ray Findings: Semiupright AP view of the chest. Right IJ venous catheter tip terminates over the cavoatrial junction with loop projecting over the lower cervical soft tissues. Enteric tube termination projects inferior to the field-of-view. Median sternotomy wires are intact. Postsurgical changes of bilateral lung transplant. The trachea is midline. Cardiac silhouette is not well-visualized. Diffuse, mixed interstitial and airspace opacities have increased in degree, most prominently in the right upper lobe and left midlung. No significant pleural effusion. No pneumothorax.     Impression: 1. Increased mixed interstitial and airspace opacities, most notably in the right upper lobe and left midlung. 2. Support devices as described in body of report. I have personally reviewed the examination and initial interpretation and I agree with the findings. SUNITA ADKINS MD    Xr Chest Port 1 View    Result Date: 2021  Exam: XR CHEST PORT 1 VW, 2021 4:34 AM Indication: s/p intubation Comparison: 2021 chest x-ray at 01:34 Findings: Semiupright AP view of the chest. Endotracheal tube termination projects 3.2 cm proximal to the gee. Right IJ venous catheter projects over the cavoatrial junction. Median sternotomy  wires are intact. Enteric tube courses inferior to the field of view. Trachea is midline. Cardiac silhouette is normal size. Stable postsurgical changes of bilateral lung transplant. Stable, diffuse patchy mixed interstitial and airspace opacities. No significant pleural effusion. No pneumothorax.     Impression: 1. Endotracheal tube terminates 3.2 cm proximal to the gee. Remaining support devices are unchanged in position. 2. Stable diffuse, patchy mixed interstitial and airspace opacities I have personally reviewed the examination and initial interpretation and I agree with the findings. SUNITA ADKINS MD    Xr Chest Port 1 View    Result Date: 1/25/2021  EXAM: XR CHEST PORT 1 VW  1/25/2021 6:41 PM HISTORY:  post left thoracentesis   COMPARISON:  Chest x-ray from same date at 0812 hours FINDINGS: Portable upright view of the chest. Endotracheal tube tip projects over the low thoracic trachea. Right internal jugular central venous catheter tip projects over the distal SVC, with coiling of the proximal tubing, unchanged from prior. Scattered mediastinal surgical clips. Trachea is midline. Cardiac silhouette is stable. No significant change in the diffuse mixed interstitial and airspace opacities bilaterally. Small bilateral pleural effusions. No pneumothorax. Intact median sternotomy wires.     IMPRESSION:  1. No significant change in the diffuse mixed interstitial and airspace opacities. 2. Small bilateral pleural effusions. 3. Unchanged coiling of the proximal right internal jugular central venous catheter. Recommend correlation to ensure that the coiling is external to the patient. I have personally reviewed the examination and initial interpretation and I agree with the findings. TALI CARRANZA MD    Xr Chest Port 1 View    Result Date: 1/25/2021  Portable chest INDICATION: Acute hypoxemic respiratory failure COMPARISON: 1/24/2021 FINDINGS: Heart size remains normal. Right IJ catheter tip appears in  the region of the SVC/right atrial junction. There is some coiling at the superior aspect of the tubing, this could be outside of the patient, please correlate clinically to ensure that this is not coiled within a vascular structure. No pneumothorax. Median sternotomy again present. Opacities in the lungs appears similar along with blunting of right costophrenic angle. Endotracheal tube tip there is approximately 2.3 cm above the gee. Surgical clips in the right axillary region are present. NG/OG tube tip and sidehole projected within the stomach.     IMPRESSION: Continued opacification in the lungs which may represent infection or edema. No significant change. Unchanged small right pleural effusion. TERRI ISSA MD    Xr Chest Port 1 View    Result Date: 1/24/2021  EXAM: XR CHEST PORT 1 VW  1/24/2021 3:08 PM HISTORY:  s/p b/l lung transplant w/new HRF, also confirm line/tube placement   COMPARISON:  Chest x-ray 12/9/2020 FINDINGS: Portable supine view of the chest. Post surgical changes of bilateral lung transplant with mediastinal surgical clips and intact medial sternotomy wires. Endotracheal tube tip projects over the low thoracic trachea. Right internal jugular central venous catheter tip projects at the superior cavoatrial junction. Enteric tube tip and sidehole projected within the stomach. The trachea is midline. Cardiac silhouette is within normal limits. There patchy airspace opacities bilaterally. Small right pleural effusion with overlying basilar opacities. No pneumothorax.     IMPRESSION: 1. Endotracheal tube tip projects over the low thoracic trachea. 2. Right internal jugular central venous catheter tip projects over the superior cavoatrial junction. 2. Enteric tube tip and sidehole projected within the stomach. 4. Patchy airspace opacities bilaterally, suggestive of edema versus infection. 5. Small right pleural effusion. I have personally reviewed the examination and initial  interpretation and I agree with the findings. MEHNAZ CHAPMAN MD    Xr Abdomen Port 1 View    Result Date: 1/26/2021  EXAM: XR ABDOMEN PORT 1 VW  1/26/2021 4:16 PM HISTORY:  [place] repositioned   COMPARISON:  Abdominal radiograph from same date FINDINGS: Portable semiupright view of the abdomen. The feeding tube has been repositioned with tip projecting at the expected location of the gastric antrum. Nonobstructive bowel gas pattern. Partially visualized median sternotomy wires. Bibasilar opacities. Osseous structures are within normal limits.     IMPRESSION: Feeding tube tip now projects over the gastric antrum. I have personally reviewed the examination and initial interpretation and I agree with the findings. ANNE MANZANO MD    Xr Abdomen Port 1 View    Result Date: 1/26/2021  Exam: XR ABDOMEN PORT 1 VW, 1/26/2021 1:01 PM Indication: [place] Comparison: 10/24/2019 Findings: Supine AP views of the abdomen were obtained. The enteric tube is coiled in the distal stomach with the tip positioned over the fundus. Partially imaged central venous catheter tip projects over the high right atrium. Paucity small bowel gas without air-filled dilated loops of bowel, pneumatosis, or portal venous gas. Bibasilar opacities, better demonstrated on recent chest radiograph. Degenerative changes in the lumbar spine. No acute osseous abnormalities. Soft tissue edema along the flanks.     Impression: The enteric tube tip projects over the gastric fundus. Recommend repositioning if postpyloric position is desired. NEAL SANTACRUZ MD    Ct Chest W/o Contrast    Result Date: 1/27/2021  Chest CT without contrast INDICATION: Pneumonia, effusion or abscess suspected COMPARISON: Outside chest CT 1/23/2021 FINDINGS: Diffuse patchy opacities in the lungs with groundglass opacities are overall quite similar to slightly decreased in appearance but still dominant. Lower lobe consolidative opacities are actually increased. The included thyroid  appears normal. Median sternotomy. Rim hyperdense/calcified possible abscess (series 2 image 33) measures approximately 5.6 x 5.4 cm in the axial plane which is similar to previous. Small right and left other pleural effusions noted. Upper abdomen shows gallstones along with enteric tube progressing at least into the stomach. No suspicious retrosternal fluid collections. Mild body wall edema noted. No adrenal masses. No enlarged axillary, mediastinal or hilar lymph nodes. Heart is overall slightly enlarged. No pericardial effusion. The main pulmonary artery is normal in size, as is the thoracic aorta at upper limits of normal in size. Small amount of perihepatic ascites. Bones show degenerative disc changes in the thoracic spine. No suspicious sclerotic or lytic/destructive lesion.     IMPRESSION: No significant rim calcified fluid density in left lower chest which may represent abscess. Other small bilateral pleural effusions. Diffuse patchy consolidations in both lungs, slightly decreased in the upper lobes and slightly increased in the lower lobes. Minimal amount of perihepatic ascites. Cholelithiasis. Mild thoracic spondylosis. TERRI ISSA MD    Xr Feeding Tube Placement    Result Date: 1/27/2021  Feeding tube placement. Comparison: Abdominal x-ray dated 1/26/2021. History: Feeding tube needed for nutrition. Unsuccessful attempt to place feeding tube at bedside using Enteral access system (Cortrak) Fluoroscopy time:  13.3 minute[s] Technique: After injection of lidocaine gel into the right nostril, attempted to advance previously placed cortrak feeding tube under fluoroscopic guidance. Despite numerous attempts with multiple guidewires, this was unsuccessful. Cortrak feeding tube was removed and a new feeding tube was placed and advanced under fluoroscopic guidance. Findings: The feeding tube is advanced as far as possible. Despite numerous attempts, the tip was unable to be advanced beyond the pyloric  sphincter. Feeding tube positioned with tip near the pylorus, likely abutting the pyloric sphincter. A small amount of barium, water, and air was injected to define anatomy. Guidewire was removed and placed with plastic bag attached to patient whiteboard. Feeding tube secured with nasal bridle and flushed with water.     Impression: Feeding tube placement with tip advanced as far as possible; positioned with tip abutting the pyloric sphincter within the gastric lumen. Due to upper limits of recommended fluoroscopy time, further attempts at placement deferred until following day. Feeding tube was secured in place with additional length of tube within the gastric lumen in anticipation of duodenal advancement secondary to peristalsis. May consider obtaining follow-up abdominal x-ray in a.m. to reevaluate position. I have personally reviewed the examination and initial interpretation and I agree with the findings. ANA CRISOSTOMO, DO

## 2021-01-28 NOTE — PLAN OF CARE
"ICU End of Shift Summary. See flowsheets for vital signs and detailed assessment.    Changes this shift:   Pt on sedation study with pt controlled precedex PCA.  Pt did not use PCA in first four hours, so basal weaned by 0.1 mcg/kg/hr per protocol.  Pt wrote note to RN asking what the medicine was, \" just curious\"  and wrote in a note that she had had \" dreamt that she was dying.\"  Writer explained to patient that medication is a study drug meant to relieve anxiety and that patient was doing well and was safe and was not dying.  Pt denied anxiety and pain.  Pt rested between cares throughout the night but did call RN and write a note for \" something to make me sleepy\" at 0120.  RN called study writer Dr Almonte to ask about options given that all PRN meds were for anxiety, agitation or pain and that none of those applied in this case and Dr Almonte appoved of giving versed for relaxation/sleep.  Pt also wrote once that it was somewhat \"heavy to breathe\" when asked if she meant a lot of pressure with each breath, she said yes.  Writer explained that she had increased PEEP to help open up her lungs. Pt nodded her head.         Plan:  Continue to monitor and update MD with changes and concerns, consider ordering PRN med for sleep tomorrow night.     Problem: Adult Inpatient Plan of Care  Goal: Plan of Care Review  Outcome: No Change  Goal: Patient-Specific Goal (Individualized)  Outcome: No Change  Goal: Absence of Hospital-Acquired Illness or Injury  Outcome: No Change  Intervention: Identify and Manage Fall Risk  Recent Flowsheet Documentation  Taken 1/28/2021 0400 by Silvana Cardona RN  Safety Promotion/Fall Prevention:   activity supervised   lighting adjusted   room door open   room near nurse's station   room organization consistent   supervised activity   clutter free environment maintained  Taken 1/28/2021 0000 by Silvana Cardona, RN  Safety Promotion/Fall Prevention:   activity supervised   lighting " adjusted   room door open   room near nurse's station   room organization consistent   supervised activity   clutter free environment maintained  Taken 1/27/2021 2000 by Silvana Cardona RN  Safety Promotion/Fall Prevention:   activity supervised   lighting adjusted   room door open   room near nurse's station   room organization consistent   supervised activity   clutter free environment maintained  Intervention: Prevent Skin Injury  Recent Flowsheet Documentation  Taken 1/28/2021 0600 by Silvana Cardona RN  Body Position: turned  Taken 1/28/2021 0400 by Silvana Cardona RN  Body Position:   turned   left   heels elevated   side-lying   upper extremity elevated  Taken 1/28/2021 0000 by Silvana Cardona RN  Body Position:   turned   left   heels elevated   side-lying   upper extremity elevated  Taken 1/27/2021 2000 by Silvana Cardona RN  Body Position:   turned   left   heels elevated   side-lying   upper extremity elevated  Intervention: Prevent and Manage VTE (Venous Thromboembolism) Risk  Recent Flowsheet Documentation  Taken 1/28/2021 0400 by Silvana Cardona RN  VTE Prevention/Management:   bleeding precautions maintained   bleeding risk assessed   PROM (passive range of motion) performed   dorsiflexion/plantar flexion performed  Taken 1/28/2021 0000 by Silvana Cardona RN  VTE Prevention/Management:   bleeding precautions maintained   bleeding risk assessed   PROM (passive range of motion) performed   dorsiflexion/plantar flexion performed  Taken 1/27/2021 2000 by Silvana Cardona RN  VTE Prevention/Management:   bleeding precautions maintained   bleeding risk assessed   PROM (passive range of motion) performed   dorsiflexion/plantar flexion performed  Goal: Optimal Comfort and Wellbeing  Outcome: No Change  Goal: Readiness for Transition of Care  Outcome: No Change

## 2021-01-28 NOTE — PROGRESS NOTES
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  She who was admitted to OSH on  for acute hypoxemic respiratory failure and new lung opacities. Intubated  and transferred to [facility] for ongoing management.  Extubated  but reintubated  for progressive hypoxemia and increased infiltrates on imaging.     Today's recommendations:  - Tacrolimus level 8.8 today (Goal 8-10).  No dose changes today. Next level check on  (ordered).  - Will check Beta D glucan - she has had invasive fungal infections and has been chronically elevated but trending down with treatment.  If significantly elevated today may support diagnosis of PJP PNA.   - Agree with Prednisone 40mg BID and continuing treatment for PJP PNA given significant hypoxemia.   - VGCV for CMV ppx with stress dose steroids       Acute hypoxemic respiratory failure:   Right post-obstructive Stenotrophomonas pneumonia:  Septic shock: Presented to OSH ED with increased SOB and hypoxia, initially requiring 4L NC but continued to decline and subsequently intubated .  Hemodynamic instability after intubation requiring pressors, transferred to [facility].  Afebrile but with leukocytosis.  COVID and respiratory panel negative.  PTA bronch (20) with moderate airway obstruction and RML/RLL mucous plugging, cultures with Stenotrophomonas and Eikenella (IV ceftazidime -).  OSH CT chest with new multifocal GGO bilaterally and increased left  loculated pleural effusion.  Repeat bronch (1/24) with RUL BAL with thick copious secretions to RML/RLL.  CXR (1/25) with stable opacities and small bilateral pleural effusions. Extubated to 4L on 1/26.  Reintubated 1/27 for progressive hypoxemia.     - Blood cultures and fungal blood cultures (1/24) NGTD  - BAL cultures (1/24) NGTD  - BAL PJP PCR was positive   - ABX per MICU: vancomycin, Zosyn, and Bactrim based on recent cultures  - Pulmonary toilet with chest physiotherapy QID and nebs: Duoneb QID and Mucomyst QID    Aspergillus empyema (left-sided): First noted 10/8/19.  CT scan on 7/17/20 with increased mass-like density, likely pleural-based, in LLL area s/p needle aspiration (8/13/20) with Aspergillus fumigatus on cultures s/p intrapleural bead placement (amphoterecin, 11/20).  Following with ID as OP.  LFTs stable on admission (ALP chronically mildly elevated).  CT chest with increased loculated left pleural effusion s/p thoracentesis (1/25), exudative with 87% neutrophils, very high LDL (6308), cytology normal.  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 Recent CMV (12/9) and DSA (1/25) not detected.  Continued right mainstem stenosis noted with PTA bronch on 12/23/20, mild on 1/24 bronch.     Immunosuppression: On 2 drug IST d/t recurrent infections  - Tacrolimus 1 mg qAM / 0.5 mg qPM.  Goal level 8-10.  Next level 1/28 (ordered).  - Holding chronic prednisone while on stress dose steroids (PTA 5mg qAM and 2.5mg qPM)     Prophylaxis:   - Bactrim treatment dose as above. PTA was not on PJP ppx since 7/28/20 as CD4>200.      H/o CMV viremia: CMV D+/R+.  CMV <137 (1/25).  - VGCV for CMV ppx with stress dose steroids (1/26)      EBV viremia: Last level 12/9/20 mildly elevated  "to 10K (log 4) from prior 3K (3.5 log) on 9/9/20.  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 Chronic iHD M/Th via AVF with partial graft. iHD run 1/22 and again on 1/26, primarily for clearance (350 ml UF).  - Monitoring of tacrolimus as above  - Dialysis management per nephrology     We appreciate the excellent care provided by the MICU team. Recommendations communicated via this note. Will continue to follow along closely, please do not hesitate to call with any questions or concerns.      Clarisse Roberson MD MSCI     Subjective & Interval History:     No acute events overnight. Remains on mechanical ventilation FiO2 50%.  Denies pain or discomfort.      Review of Systems:     Please see HPI, otherwise the complete 10 point ROS is negative.     Physical Exam:     Vital signs:  Temp: 97.8  F (36.6  C) Temp src: Axillary BP: 109/66 Pulse: 98   Resp: 25 SpO2: 96 % O2 Device: Mechanical Ventilator Oxygen Delivery: (S) 35 LPM Height: 160 cm (5' 2.99\") Weight: 64.1 kg (141 lb 5 oz)  I/O:     Intake/Output Summary (Last 24 hours) at 1/28/2021 0803  Last data filed at 1/28/2021 0700  Gross per 24 hour   Intake 1058.28 ml   Output --   Net 1058.28 ml       GENERAL: alert, NAD  HEENT: NCAT, EOMI +ETT  Lungs: good air flow, no crackles, rhonchi or wheezing  CV: RRR, S1S2, no murmurs noted  Abdomen: normoactive BS, soft, non tender  Neuro: unable to assess  Psychiatric: good eye contact and mouths words appropriately  Skin: no rash, jaundice or lesions on limited exam  Extremities: No clubbing, cyanosis or edema.    Lines, Drains, and Devices:  CVC Double Lumen 10/25/19 Right Internal jugular (Active)   Number of days: 461       Hemodialysis Vascular Access Arteriovenous fistula Left Arm (Active)   Site Assessment WDL;Bruit present;Thrill present 01/28/21 0400   Cannulation Needle Size 15 01/26/21 1630   Dressing Intervention New dressing 01/26/21 1920   Dressing Status Clean, dry, intact 01/28/21 0400 "   Number of days: 3       CVC TRIPLE LUMEN Right Internal jugular (Active)   Site Assessment WDL 01/28/21 0400   Dressing Type Chlorhexidine sponge;Transparent 01/28/21 0400   Dressing Status clean;dry;intact 01/28/21 0400   Dressing Intervention dressing changed 01/25/21 1800   Dressing Change Due 01/31/21 01/28/21 0400   Line Necessity yes, meets criteria 01/28/21 0400   Blue - Status infusing 01/28/21 0400   Blue - Cap Change Due 01/29/21 01/28/21 0400   Brown - Status saline locked 01/28/21 0400   Brown - Cap Change Due 01/29/21 01/28/21 0400   White - Status infusing 01/28/21 0400   White - Cap Change Due 01/29/21 01/28/21 0400   Phlebitis Scale 0-->no symptoms 01/28/21 0400   Infiltration? no 01/28/21 0400   Infiltration Scale 0 01/28/21 0400   Infiltration Site Treatment Method  None 01/28/21 0400   CVC Comment CDI 01/27/21 2000   Number of days: 4     Data:     LABS    CMP:   Recent Labs   Lab 01/28/21  0412 01/27/21  0414 01/26/21  0425 01/25/21  0406 01/24/21  1653 01/24/21  1458    135 134 135  --  134   POTASSIUM 3.7 3.8 4.0 4.1 3.8 3.7   CHLORIDE 103 101 100 101  --  98   CO2 22 23 19* 16*  --  16*   ANIONGAP 11 11 15* 17*  --  20*   * 172* 276* 187*  --  155*   BUN 53* 44* 72* 58*  --  48*   CR 3.77* 3.18* 4.45* 3.73*  --  3.39*   GFRESTIMATED 12* 15* 10* 13*  --  14*   GFRESTBLACK 14* 18* 12* 15*  --  16*   CHELSEY 7.9* 8.4* 8.0* 8.1*  --  8.2*   MAG  --  2.2 2.8* 2.5* 2.5* 2.4*   PHOS  --  5.1* 7.0* 6.8* 6.5* 6.5*   PROTTOTAL 5.3* 6.0* 6.0*  6.0*  --   --  6.1*   ALBUMIN 1.8* 2.2* 2.0*  --   --  2.0*   BILITOTAL 0.6 0.8 0.8  --   --  1.2   ALKPHOS 164* 171* 184*  --   --  244*   AST 10 15 11  --   --  31   ALT 26 30 30  --   --  35     CBC:   Recent Labs   Lab 01/28/21  0412 01/27/21  0414 01/26/21  0425 01/25/21  0406   WBC 9.3 15.2* 16.9* 12.5*   RBC 3.39* 3.56* 3.45* 3.59*   HGB 10.3* 10.8* 10.5* 10.9*   HCT 31.2* 32.1* 29.7* 33.4*   MCV 92 90 86 93   MCH 30.4 30.3 30.4 30.4   MCHC 33.0  33.6 35.4 32.6   RDW 14.7 14.5 13.7 14.4    231 252 167       INR: No lab results found in last 7 days.    Glucose:   Recent Labs   Lab 01/28/21  0412 01/28/21  0400 01/28/21  0017 01/27/21  2034 01/27/21  1547 01/27/21  1208 01/27/21  0756 01/27/21  0414 01/26/21  0425 01/26/21  0425 01/25/21  0406 01/25/21  0406 01/24/21  1458 01/24/21  1458   *  --   --   --   --   --   --  172*  --  276*  --  187*  --  155*   BGM  --  164* 154* 117* 127* 119* 135*  --    < >  --    < >  --    < > 150*    < > = values in this interval not displayed.       Blood Gas:   Recent Labs   Lab 01/28/21  0404 01/27/21  0540 01/27/21  0316 01/26/21  0425 01/26/21  0425 01/25/21  0406   PHV 7.36  --   --   --  7.42 7.37   PCO2V 44  --   --   --  33* 31*   PO2V 44  --   --   --  49* 54*   HCO3V 25  --   --   --  21 17*   O2PER 50.0 50.0 80.0   < > 45.0 50.0    < > = values in this interval not displayed.       Culture Data   Recent Labs   Lab 01/25/21  1347 01/24/21  1841 01/24/21  1830 01/24/21  1729 01/24/21  1652 01/24/21  1629   CULT Culture received and in progress.  Positive AFB results are called as soon as detected.    Final report to follow in 7 to 8 weeks.    Assayed at [email or web address] [address] [place] [id number] [phone]  Culture negative after 2 days  Culture negative monitoring continues  Culture negative monitoring continues No growth after 4 days No growth after 3 days No growth No growth after 4 days Culture received and in progress.  Positive AFB results are called as soon as detected.    Final report to follow in 7 to 8 weeks.    Assayed at [email or web address] [address] [place] [address] [phone]  No growth after 3 days  Culture negative after 3 days  No growth  Culture negative monitoring continues       Virology Data:   Lab Results   Component Value Date    FLUAH1 Not Detected 01/24/2021    FLUAH3 Not Detected 01/24/2021    BK7157 Not Detected 01/24/2021    IFLUB Not  Detected 01/24/2021    RSVA Not Detected 01/24/2021    RSVB Not Detected 01/24/2021    PIV1 Not Detected 01/24/2021    PIV2 Not Detected 01/24/2021    PIV3 Not Detected 01/24/2021    HMPV Not Detected 01/24/2021    HRVS Negative 01/24/2021    ADVBE Negative 01/24/2021    ADVC Negative 01/24/2021    ADVC Negative 12/23/2020    ADVC Negative 10/07/2019       Historical CMV results (last 3 of prior testing):  Lab Results   Component Value Date    CMVQNT <137 (A) 01/25/2021    CMVQNT 238 (A) 01/24/2021    CMVQNT 675 (A) 12/23/2020     Lab Results   Component Value Date    CMVLOG <2.1 01/25/2021    CMVLOG 2.4 (H) 01/24/2021    CMVLOG 2.8 (H) 12/23/2020       Urine Studies    Recent Labs   Lab Test 01/24/21  1729 10/21/19  2240   URINEPH 5.0 5.0   NITRITE Negative Negative   LEUKEST Moderate* Large*   WBCU 34* 115*       Most Recent [place] Pulmonary Function Testing (FVC/FEV1 only)  FVC-Pre   Date Value Ref Range Status   12/09/2020 1.12 L    09/09/2020 1.56 L    03/09/2020 1.57 L    02/19/2020 1.78 L      FVC-%Pred-Pre   Date Value Ref Range Status   12/09/2020 34 %    09/09/2020 47 %    03/09/2020 48 %    02/19/2020 54 %      FEV1-Pre   Date Value Ref Range Status   12/09/2020 0.98 L    09/09/2020 1.10 L    03/09/2020 0.96 L    02/19/2020 0.99 L      FEV1-%Pred-Pre   Date Value Ref Range Status   12/09/2020 37 %    09/09/2020 42 %    03/09/2020 37 %    02/19/2020 38 %        IMAGING    Recent Results (from the past 48 hour(s))   XR Abdomen Port 1 View    Narrative    Exam: XR ABDOMEN PORT 1 VW, 1/26/2021 1:01 PM    Indication: NJ    Comparison: 10/24/2019    Findings:   Supine AP views of the abdomen were obtained. The enteric tube is  coiled in the distal stomach with the tip positioned over the fundus.  Partially imaged central venous catheter tip projects over the high  right atrium. Paucity small bowel gas without air-filled dilated loops  of bowel, pneumatosis, or portal venous gas. Bibasilar opacities,  better  demonstrated on recent chest radiograph. Degenerative changes  in the lumbar spine. No acute osseous abnormalities. Soft tissue edema  along the flanks.      Impression    Impression:   The enteric tube tip projects over the gastric fundus. Recommend  repositioning if postpyloric position is desired.     NEAL SANTACRUZ MD   XR Abdomen Port 1 View    Narrative    EXAM: XR ABDOMEN PORT 1 VW  1/26/2021 4:16 PM     HISTORY:  NJ repositioned       COMPARISON:  Abdominal radiograph from same date    FINDINGS:   Portable semiupright view of the abdomen. The feeding tube has been  repositioned with tip projecting at the expected location of the  gastric antrum. Nonobstructive bowel gas pattern. Partially visualized  median sternotomy wires. Bibasilar opacities. Osseous structures are  within normal limits.      Impression    IMPRESSION:   Feeding tube tip now projects over the gastric antrum.     I have personally reviewed the examination and initial interpretation  and I agree with the findings.    ANNE MANZANO MD   XR Chest Port 1 View    Narrative    Exam: XR CHEST PORT 1 VW, 1/27/2021 1:54 AM    Indication: increased oxygen requirements    Comparison: 1/25/2021 chest x-ray    Findings:   Semiupright AP view of the chest. Right IJ venous catheter tip  terminates over the cavoatrial junction with loop projecting over the  lower cervical soft tissues. Enteric tube termination projects  inferior to the field-of-view. Median sternotomy wires are intact.  Postsurgical changes of bilateral lung transplant.    The trachea is midline. Cardiac silhouette is not well-visualized.  Diffuse, mixed interstitial and airspace opacities have increased in  degree, most prominently in the right upper lobe and left midlung. No  significant pleural effusion. No pneumothorax.      Impression    Impression:   1. Increased mixed interstitial and airspace opacities, most notably  in the right upper lobe and left midlung.  2. Support devices  as described in body of report.    I have personally reviewed the examination and initial interpretation  and I agree with the findings.    SUNITA ADKINS MD   XR Chest Port 1 View    Narrative    Exam: XR CHEST PORT 1 VW, 1/27/2021 4:34 AM    Indication: s/p intubation    Comparison: 1/27/2021 chest x-ray at 01:34    Findings:   Semiupright AP view of the chest. Endotracheal tube termination  projects 3.2 cm proximal to the gee. Right IJ venous catheter  projects over the cavoatrial junction. Median sternotomy wires are  intact. Enteric tube courses inferior to the field of view.    Trachea is midline. Cardiac silhouette is normal size. Stable  postsurgical changes of bilateral lung transplant. Stable, diffuse  patchy mixed interstitial and airspace opacities. No significant  pleural effusion. No pneumothorax.      Impression    Impression:   1. Endotracheal tube terminates 3.2 cm proximal to the gee.  Remaining support devices are unchanged in position.  2. Stable diffuse, patchy mixed interstitial and airspace opacities    I have personally reviewed the examination and initial interpretation  and I agree with the findings.    SUNITA ADKINS MD   CT Chest w/o Contrast    Narrative    Chest CT without contrast    INDICATION: Pneumonia, effusion or abscess suspected    COMPARISON: Outside chest CT 1/23/2021    FINDINGS: Diffuse patchy opacities in the lungs with groundglass  opacities are overall quite similar to slightly decreased in  appearance but still dominant. Lower lobe consolidative opacities are  actually increased.  The included thyroid appears normal. Median sternotomy. Rim  hyperdense/calcified possible abscess (series 2 image 33) measures  approximately 5.6 x 5.4 cm in the axial plane which is similar to  previous. Small right and left other pleural effusions noted.  Upper abdomen shows gallstones along with enteric tube progressing at  least into the stomach. No suspicious retrosternal fluid  collections.  Mild body wall edema noted. No adrenal masses. No enlarged axillary,  mediastinal or hilar lymph nodes. Heart is overall slightly enlarged.  No pericardial effusion. The main pulmonary artery is normal in size,  as is the thoracic aorta at upper limits of normal in size. Small  amount of perihepatic ascites. Bones show degenerative disc changes in  the thoracic spine. No suspicious sclerotic or lytic/destructive  lesion.      Impression    IMPRESSION: No significant rim calcified fluid density in left lower  chest which may represent abscess. Other small bilateral pleural  effusions. Diffuse patchy consolidations in both lungs, slightly  decreased in the upper lobes and slightly increased in the lower  lobes. Minimal amount of perihepatic ascites. Cholelithiasis. Mild  thoracic spondylosis.    TERRI ISSA MD   XR Feeding Tube Placement    Narrative    Feeding tube placement.    Comparison: Abdominal x-ray dated 1/26/2021.    History: Feeding tube needed for nutrition. Unsuccessful attempt to  place feeding tube at bedside using Enteral access system (Cortrak)    Fluoroscopy time:  13.3 minute[s]    Technique: After injection of lidocaine gel into the right nostril,  attempted to advance previously placed cortrak feeding tube under  fluoroscopic guidance. Despite numerous attempts with multiple  guidewires, this was unsuccessful. Cortrak feeding tube was removed  and a new feeding tube was placed and advanced under fluoroscopic  guidance.    Findings: The feeding tube is advanced as far as possible. Despite  numerous attempts, the tip was unable to be advanced beyond the  pyloric sphincter. Feeding tube positioned with tip near the pylorus,  likely abutting the pyloric sphincter. A small amount of barium,  water, and air was injected to define anatomy. Guidewire was removed  and placed with plastic bag attached to patient whiteboard. Feeding  tube secured with nasal bridle and flushed with  water.      Impression    Impression: Feeding tube placement with tip advanced as far as  possible; positioned with tip abutting the pyloric sphincter within  the gastric lumen. Due to upper limits of recommended fluoroscopy  time, further attempts at placement deferred until following day.  Feeding tube was secured in place with additional length of tube  within the gastric lumen in anticipation of duodenal advancement  secondary to peristalsis. May consider obtaining follow-up abdominal  x-ray in a.m. to reevaluate position.    I have personally reviewed the examination and initial interpretation  and I agree with the findings.    ANA CRISOSTOMO, DO

## 2021-01-28 NOTE — PLAN OF CARE
ICU End of Shift Summary. See flowsheets for vital signs and detailed assessment.    Changes this shift: Neuro intact, on precedex gtt + PCA.  BP/HR stable. Afebrile. PEEP decreased to 5 this AM, FiO2 subsequently increased to 70% so back to PEEP 8, now FiO2 50%. Some complaints of SOB, frequent coughing with minimal secretions. Asking for frequent oral and ETT suctioning. Dialysis run with 3L off, tolerated well. 2 BMs, no urine output. Bladder scan 5 ml. Rad/xray confirmed NJ placement. Skin unchanged.     Plan:  Continue plan of care. Notify MICU of any changes.

## 2021-01-29 ENCOUNTER — APPOINTMENT (OUTPATIENT)
Dept: GENERAL RADIOLOGY | Facility: CLINIC | Age: 61
End: 2021-01-29
Attending: INTERNAL MEDICINE
Payer: COMMERCIAL

## 2021-01-29 LAB
ALBUMIN SERPL-MCNC: 1.9 G/DL (ref 3.4–5)
ALP SERPL-CCNC: 135 U/L (ref 40–150)
ALT SERPL W P-5'-P-CCNC: 21 U/L (ref 0–50)
ANION GAP SERPL CALCULATED.3IONS-SCNC: 11 MMOL/L (ref 3–14)
AST SERPL W P-5'-P-CCNC: 9 U/L (ref 0–45)
BASE EXCESS BLDA CALC-SCNC: 4.3 MMOL/L
BASE EXCESS BLDV CALC-SCNC: 6.8 MMOL/L
BILIRUB SERPL-MCNC: 0.5 MG/DL (ref 0.2–1.3)
BUN SERPL-MCNC: 28 MG/DL (ref 7–30)
CALCIUM SERPL-MCNC: 8.4 MG/DL (ref 8.5–10.1)
CHLORIDE SERPL-SCNC: 100 MMOL/L (ref 94–109)
CO2 SERPL-SCNC: 26 MMOL/L (ref 20–32)
CREAT SERPL-MCNC: 2.76 MG/DL (ref 0.52–1.04)
ERYTHROCYTE [DISTWIDTH] IN BLOOD BY AUTOMATED COUNT: 14.5 % (ref 10–15)
GFR SERPL CREATININE-BSD FRML MDRD: 18 ML/MIN/{1.73_M2}
GLUCOSE BLDC GLUCOMTR-MCNC: 113 MG/DL (ref 70–99)
GLUCOSE BLDC GLUCOMTR-MCNC: 122 MG/DL (ref 70–99)
GLUCOSE BLDC GLUCOMTR-MCNC: 141 MG/DL (ref 70–99)
GLUCOSE BLDC GLUCOMTR-MCNC: 145 MG/DL (ref 70–99)
GLUCOSE BLDC GLUCOMTR-MCNC: 167 MG/DL (ref 70–99)
GLUCOSE SERPL-MCNC: 164 MG/DL (ref 70–99)
HCO3 BLD-SCNC: 29 MMOL/L (ref 21–28)
HCO3 BLDV-SCNC: 30 MMOL/L (ref 21–28)
HCT VFR BLD AUTO: 28.3 % (ref 35–47)
HGB BLD-MCNC: 9.5 G/DL (ref 11.7–15.7)
MAGNESIUM SERPL-MCNC: 1.9 MG/DL (ref 1.6–2.3)
MCH RBC QN AUTO: 29.6 PG (ref 26.5–33)
MCHC RBC AUTO-ENTMCNC: 33.6 G/DL (ref 31.5–36.5)
MCV RBC AUTO: 88 FL (ref 78–100)
O2/TOTAL GAS SETTING VFR VENT: 100 %
O2/TOTAL GAS SETTING VFR VENT: 50 %
PCO2 BLD: 41 MM HG (ref 35–45)
PCO2 BLDV: 35 MM HG (ref 40–50)
PH BLD: 7.46 PH (ref 7.35–7.45)
PH BLDV: 7.54 PH (ref 7.32–7.43)
PLATELET # BLD AUTO: 176 10E9/L (ref 150–450)
PO2 BLD: 56 MM HG (ref 80–105)
PO2 BLDV: 37 MM HG (ref 25–47)
POTASSIUM SERPL-SCNC: 3.3 MMOL/L (ref 3.4–5.3)
POTASSIUM SERPL-SCNC: 3.4 MMOL/L (ref 3.4–5.3)
PROT SERPL-MCNC: 5.3 G/DL (ref 6.8–8.8)
RBC # BLD AUTO: 3.21 10E12/L (ref 3.8–5.2)
SODIUM SERPL-SCNC: 137 MMOL/L (ref 133–144)
WBC # BLD AUTO: 9.7 10E9/L (ref 4–11)

## 2021-01-29 PROCEDURE — 999N000065 XR CHEST PORT 1 VW

## 2021-01-29 PROCEDURE — 94660 CPAP INITIATION&MGMT: CPT

## 2021-01-29 PROCEDURE — 84132 ASSAY OF SERUM POTASSIUM: CPT | Performed by: STUDENT IN AN ORGANIZED HEALTH CARE EDUCATION/TRAINING PROGRAM

## 2021-01-29 PROCEDURE — 99291 CRITICAL CARE FIRST HOUR: CPT | Mod: GC | Performed by: INTERNAL MEDICINE

## 2021-01-29 PROCEDURE — 94003 VENT MGMT INPAT SUBQ DAY: CPT

## 2021-01-29 PROCEDURE — 80053 COMPREHEN METABOLIC PANEL: CPT | Performed by: STUDENT IN AN ORGANIZED HEALTH CARE EDUCATION/TRAINING PROGRAM

## 2021-01-29 PROCEDURE — 200N000002 HC R&B ICU UMMC

## 2021-01-29 PROCEDURE — 258N000003 HC RX IP 258 OP 636: Performed by: NURSE PRACTITIONER

## 2021-01-29 PROCEDURE — 85027 COMPLETE CBC AUTOMATED: CPT | Performed by: STUDENT IN AN ORGANIZED HEALTH CARE EDUCATION/TRAINING PROGRAM

## 2021-01-29 PROCEDURE — 94640 AIRWAY INHALATION TREATMENT: CPT

## 2021-01-29 PROCEDURE — 250N000011 HC RX IP 250 OP 636: Performed by: STUDENT IN AN ORGANIZED HEALTH CARE EDUCATION/TRAINING PROGRAM

## 2021-01-29 PROCEDURE — 250N000011 HC RX IP 250 OP 636: Performed by: NURSE PRACTITIONER

## 2021-01-29 PROCEDURE — 83735 ASSAY OF MAGNESIUM: CPT | Performed by: STUDENT IN AN ORGANIZED HEALTH CARE EDUCATION/TRAINING PROGRAM

## 2021-01-29 PROCEDURE — 999N001017 HC STATISTIC GLUCOSE BY METER IP

## 2021-01-29 PROCEDURE — 250N000012 HC RX MED GY IP 250 OP 636 PS 637: Performed by: INTERNAL MEDICINE

## 2021-01-29 PROCEDURE — 99233 SBSQ HOSP IP/OBS HIGH 50: CPT | Performed by: NURSE PRACTITIONER

## 2021-01-29 PROCEDURE — 250N000009 HC RX 250: Performed by: NURSE PRACTITIONER

## 2021-01-29 PROCEDURE — 999N000157 HC STATISTIC RCP TIME EA 10 MIN

## 2021-01-29 PROCEDURE — 94667 MNPJ CHEST WALL 1ST: CPT

## 2021-01-29 PROCEDURE — 74340 X-RAY GUIDE FOR GI TUBE: CPT | Mod: 26 | Performed by: RADIOLOGY

## 2021-01-29 PROCEDURE — 94668 MNPJ CHEST WALL SBSQ: CPT

## 2021-01-29 PROCEDURE — 36600 WITHDRAWAL OF ARTERIAL BLOOD: CPT

## 2021-01-29 PROCEDURE — 71045 X-RAY EXAM CHEST 1 VIEW: CPT | Mod: 26 | Performed by: RADIOLOGY

## 2021-01-29 PROCEDURE — 82803 BLOOD GASES ANY COMBINATION: CPT | Performed by: STUDENT IN AN ORGANIZED HEALTH CARE EDUCATION/TRAINING PROGRAM

## 2021-01-29 PROCEDURE — 250N000013 HC RX MED GY IP 250 OP 250 PS 637: Performed by: INTERNAL MEDICINE

## 2021-01-29 PROCEDURE — 250N000013 HC RX MED GY IP 250 OP 250 PS 637: Performed by: NURSE PRACTITIONER

## 2021-01-29 PROCEDURE — 5A09557 ASSISTANCE WITH RESPIRATORY VENTILATION, GREATER THAN 96 CONSECUTIVE HOURS, CONTINUOUS POSITIVE AIRWAY PRESSURE: ICD-10-PCS | Performed by: INTERNAL MEDICINE

## 2021-01-29 PROCEDURE — 250N000009 HC RX 250: Performed by: RADIOLOGY

## 2021-01-29 PROCEDURE — 74340 X-RAY GUIDE FOR GI TUBE: CPT

## 2021-01-29 PROCEDURE — 250N000013 HC RX MED GY IP 250 OP 250 PS 637: Performed by: STUDENT IN AN ORGANIZED HEALTH CARE EDUCATION/TRAINING PROGRAM

## 2021-01-29 PROCEDURE — 250N000012 HC RX MED GY IP 250 OP 636 PS 637: Performed by: NURSE PRACTITIONER

## 2021-01-29 PROCEDURE — 44500 INTRO GASTROINTESTINAL TUBE: CPT

## 2021-01-29 PROCEDURE — 5A0955A ASSISTANCE WITH RESPIRATORY VENTILATION, GREATER THAN 96 CONSECUTIVE HOURS, HIGH NASAL FLOW/VELOCITY: ICD-10-PCS | Performed by: NURSE PRACTITIONER

## 2021-01-29 PROCEDURE — 94640 AIRWAY INHALATION TREATMENT: CPT | Mod: 76

## 2021-01-29 RX ORDER — CEFTRIAXONE 2 G/1
2 INJECTION, POWDER, FOR SOLUTION INTRAMUSCULAR; INTRAVENOUS EVERY 24 HOURS
Status: DISCONTINUED | OUTPATIENT
Start: 2021-01-29 | End: 2021-02-01

## 2021-01-29 RX ORDER — LIDOCAINE HYDROCHLORIDE 20 MG/ML
5 SOLUTION OROPHARYNGEAL ONCE
Status: COMPLETED | OUTPATIENT
Start: 2021-01-29 | End: 2021-01-29

## 2021-01-29 RX ORDER — SULFAMETHOXAZOLE/TRIMETHOPRIM 800-160 MG
2 TABLET ORAL 2 TIMES DAILY
Status: DISCONTINUED | OUTPATIENT
Start: 2021-01-29 | End: 2021-01-29 | Stop reason: CLARIF

## 2021-01-29 RX ORDER — SULFAMETHOXAZOLE AND TRIMETHOPRIM 200; 40 MG/5ML; MG/5ML
295 SUSPENSION ORAL 2 TIMES DAILY
Status: DISCONTINUED | OUTPATIENT
Start: 2021-01-29 | End: 2021-02-04

## 2021-01-29 RX ORDER — PANTOPRAZOLE SODIUM 40 MG/1
40 TABLET, DELAYED RELEASE ORAL
Status: DISCONTINUED | OUTPATIENT
Start: 2021-01-30 | End: 2021-01-29

## 2021-01-29 RX ORDER — B COMPLEX C NO.10/FOLIC ACID 900MCG/5ML
5 LIQUID (ML) ORAL DAILY
Status: DISCONTINUED | OUTPATIENT
Start: 2021-01-29 | End: 2021-02-25 | Stop reason: HOSPADM

## 2021-01-29 RX ADMIN — INSULIN ASPART 1 UNITS: 100 INJECTION, SOLUTION INTRAVENOUS; SUBCUTANEOUS at 00:09

## 2021-01-29 RX ADMIN — Medication 10 MG: at 21:42

## 2021-01-29 RX ADMIN — SULFAMETHOXAZOLE AND TRIMETHOPRIM 295 MG: 200; 40 SUSPENSION ORAL at 21:35

## 2021-01-29 RX ADMIN — PIPERACILLIN AND TAZOBACTAM 2.25 G: 2; .25 INJECTION, POWDER, FOR SOLUTION INTRAVENOUS at 04:06

## 2021-01-29 RX ADMIN — PREDNISONE 40 MG: 20 TABLET ORAL at 21:34

## 2021-01-29 RX ADMIN — INSULIN ASPART 2 UNITS: 100 INJECTION, SOLUTION INTRAVENOUS; SUBCUTANEOUS at 04:14

## 2021-01-29 RX ADMIN — LIDOCAINE HYDROCHLORIDE 15 ML: 20 SOLUTION ORAL; TOPICAL at 13:16

## 2021-01-29 RX ADMIN — IPRATROPIUM BROMIDE AND ALBUTEROL SULFATE 3 ML: .5; 3 SOLUTION RESPIRATORY (INHALATION) at 19:34

## 2021-01-29 RX ADMIN — TACROLIMUS 1 MG: 5 CAPSULE ORAL at 14:07

## 2021-01-29 RX ADMIN — SULFAMETHOXAZOLE AND TRIMETHOPRIM 295 MG: 200; 40 SUSPENSION ORAL at 14:05

## 2021-01-29 RX ADMIN — ACETYLCYSTEINE 2 ML: 200 SOLUTION ORAL; RESPIRATORY (INHALATION) at 19:34

## 2021-01-29 RX ADMIN — PREDNISONE 40 MG: 20 TABLET ORAL at 14:22

## 2021-01-29 RX ADMIN — SODIUM CHLORIDE 300 MG: 9 INJECTION, SOLUTION INTRAVENOUS at 14:00

## 2021-01-29 RX ADMIN — Medication 40 MG: at 14:07

## 2021-01-29 RX ADMIN — BUDESONIDE 0.5 MG: 0.5 INHALANT ORAL at 07:57

## 2021-01-29 RX ADMIN — BUDESONIDE 0.5 MG: 0.5 INHALANT ORAL at 19:34

## 2021-01-29 RX ADMIN — ACETYLCYSTEINE 2 ML: 200 SOLUTION ORAL; RESPIRATORY (INHALATION) at 15:15

## 2021-01-29 RX ADMIN — IPRATROPIUM BROMIDE AND ALBUTEROL SULFATE 3 ML: .5; 3 SOLUTION RESPIRATORY (INHALATION) at 15:15

## 2021-01-29 RX ADMIN — ACETYLCYSTEINE 2 ML: 200 SOLUTION ORAL; RESPIRATORY (INHALATION) at 12:39

## 2021-01-29 RX ADMIN — HEPARIN SODIUM 5000 UNITS: 5000 INJECTION, SOLUTION INTRAVENOUS; SUBCUTANEOUS at 16:26

## 2021-01-29 RX ADMIN — TACROLIMUS 0.5 MG: 5 CAPSULE ORAL at 18:37

## 2021-01-29 RX ADMIN — INSULIN ASPART 1 UNITS: 100 INJECTION, SOLUTION INTRAVENOUS; SUBCUTANEOUS at 21:32

## 2021-01-29 RX ADMIN — IPRATROPIUM BROMIDE AND ALBUTEROL SULFATE 3 ML: .5; 3 SOLUTION RESPIRATORY (INHALATION) at 07:57

## 2021-01-29 RX ADMIN — IPRATROPIUM BROMIDE AND ALBUTEROL SULFATE 3 ML: .5; 3 SOLUTION RESPIRATORY (INHALATION) at 12:39

## 2021-01-29 RX ADMIN — ACETYLCYSTEINE 2 ML: 200 SOLUTION ORAL; RESPIRATORY (INHALATION) at 07:57

## 2021-01-29 RX ADMIN — HEPARIN SODIUM 5000 UNITS: 5000 INJECTION, SOLUTION INTRAVENOUS; SUBCUTANEOUS at 00:09

## 2021-01-29 RX ADMIN — CEFTRIAXONE 2 G: 2 INJECTION, POWDER, FOR SOLUTION INTRAMUSCULAR; INTRAVENOUS at 16:26

## 2021-01-29 RX ADMIN — Medication 5 ML: at 14:05

## 2021-01-29 ASSESSMENT — ACTIVITIES OF DAILY LIVING (ADL)
ADLS_ACUITY_SCORE: 17
ADLS_ACUITY_SCORE: 15
ADLS_ACUITY_SCORE: 17
ADLS_ACUITY_SCORE: 17

## 2021-01-29 NOTE — PROVIDER NOTIFICATION
MICU 1 notified of retracted feeding tube (now at 60cm from 90) and results of ABG (PO2 56). Abdominal xray released to verify feeding tube position, if gastric, OK to continue use overnight. MD bedside to assess patient given SpO2 < 88% despite increasing O2 requirements. RT notified and switched to bipap.

## 2021-01-29 NOTE — PROGRESS NOTES
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 She was transferred to OSH ICU 1/22 and intubated and requiring vasopressors. 1/24 transferred to [facility] Medical ICU service for ongoing management. Outside chest CT showed bilateral infiltrates. Antibiotic treatment with vanc, zosyn, and bactrim (steno history). She was bronched with no specific growth on BAL ( with neutrophil predominance) however BAL PJP PCR was positive (returned 1/28), left diagnostic thoracentesis was done which returned exudative with inflammatory cells but no specific growth. Blood and urine cultures have remained negative. IgE was not elevated.  She was extubated on 1/26/2021, however, patient re-intubated on same day due to worsening O2 requirement. CXR with worsened consolidations and concern for possible edema. BNP was 28,000. 2D echo showed preserved EF. CT chest was concerning for alveolar and interstitial opacities. Ventilator liberation was likely unsuccessful due to fluid overload/pulmonary edema in the setting of underlying PJP PNA. Bactrim was continued and steroids were added yesterday (prednisone 40 mg BID). Fungitell is pending. Had HD session yesterday with removal of 3 L of fluids. Currently undergoing PST and will plan to extubate later today.    The patient was seen and examined with the resident/fellow physician.  We have discussed the patient in detail and I agree with the findings, assessment, and plan as documented when this note was cosigned on this day. The plan was formulated in  conjunction with pharmacy, ICU nurses, and respiratory therapist. I have evaluated all laboratory values and imaging studies for the past 24 hours. I have reviewed all the consults that have been ordered and are active for this patient.      Critical Care Time: 30 min.  I spent this time (excluding procedures) personally providing and directing critical care services at the bedside and on the critical care unit.      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 She was transferred to OSH ICU 1/22 and intubated and requiring vasopressors. 1/24 transferred to [facility] Medical ICU service for ongoing management.     CHANGES and MAJOR THINGS TODAY:   - Plan for PST this AM and will extubate if patient tolerates well.  - NJT not functioning, will pull  - If patient successfully extubated will give nutrition and meds PO, SLP previously cleared for FLD in teaspoon portions, will place NGT if needed  - Continue treatment with bactrim, will change steroids to PCP treatment prednisone taper  - Continue empiric antibiotic treatment deescalating to ceftriaxone    PLAN:     Neuro:  # Pain and sedation  - Transitioned to precedex per trial protocol, patient seems comfortable, will discharge following extubation    # Insomnia  - Melatonin ordered      Pulmonary:  # Acute Hypoxic Respiratory Failure  Suspected 2/2 pneumonia. OSH CT 1/23 + bilateral ground glass opacities and consolidative lung  infiltrates centrally distributed to the upper lobes and RLL, . Covid-19 negative X3 at OSH. Patient re-intubated overnight due to worsening O2 requirement. CXR with worsened consolidations and concern for possible edema.  - Plan for PST this AM and will extubate if patient tolerates well.  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  - Continue PTA tacrolimus  - Hold PTA prednisone while on steroids for PCP  - Pulmonary lung transplant team consulted, appreciate recs     Cardiovascular:  # Shock  # HX Hypertension  Patient was transferred on Levophed. Right heart catheterization 03/10/2020 concluded right sided filling pressures mildly elevated, mild PHTN, mild right sided filling pressures, and normal cardiac output. Last ECHO 10/21/20 with EF 60-65%, normal LV & RV function.   - Patient weaned off pressors 1/26 currently hemodynamically stable off pressors  - Hold PTA coreg and amlodipine      GI/Nutrition:  #Portal HTN  Liver biopsy positive for congestive hepatopathy. Previous had ascites thought to be r/t elevated right sided heart pressures. Last saw GI on 12/21/20 and patient endorsed that her ascites is no longer present.   - NTD     #Nutrition  - NJT not functioning, will pull  - If patient successfully extubated will give nutrition and meds PO, SLP previously cleared for FLD in teaspoon portions, will place NGT if needed    #  Constipation  Patient reportedly has history of constipation with bactrim use, now stooling  - Scheduled miralax and senna-doc ordered  - PRN dulcolax ordered     Renal/Fluids/Electrolytes:  # ESRD on iHD twice weekly (M/Th).   # Anion gap metabolic acidosis r/t ESRD, uremia (OSH ABG 7.31/37/120/19 & Anion Gap 25.7)   Outpatient dry body weight 56.5 kg. Ran dialysis 1/26, 1/28  - Nephrology Consulted   - Continued dialysis per neph recs  - I&O  - Daily Weights   - Trend BMP      Endocrine:  # Seronegative RA with Raynaud's   - NTD, monitor      High intensity sliding scale insulin ordered given steroids     ID:  # Sepsis   Suspected related to bacterial pneumonia. OSH CT 1/23 + bilateral ground glass opacities and consolidative lung infiltrates centrally distributed to the upper lobes and RLL. Covid-19 negative X3 at OSH. Procalcitonin negative. Urine strep, CMV, and RPP negative.  - PCP PCR positive  - Continue treatment with bactrim, will change steroids to PCP treatment prednisone taper  - Bronch completed, KOH and Gram not revealing,  with neutrophil predominance, other labs pending  - s/p thora 1/25, fluid exudative, micro pending  - F/U pending micro w/u   - Continue empiric antibiotic treatment deescalating to ceftriaxone     # Chronic/Stable right aspergillus empyema   - Continue PTA posaconazole, will monitor QTc     Hematology:    # Anemia r/t renal disease, chronic stable   # Thrombocytopenia r/t critical illness, resolved   # Leukocytosis, infection/steroids  Takes aranesp 25 mcg every four weeks, last dose 01/14.    - Daily CBC     Musculoskeletal:  - PT/OT when appropriate      Skin:  # Dermatomyositis antitryptase syndrome   - Noted, NTD     General Cares/Prophylaxis:    DVT Prophylaxis: Heparin SQ  GI Prophylaxis: PPI  Restraints: Not indicated  Family Communication:  updated at bedside  Code Status: Full      Lines/tubes/drains:  - Basilio  - CVC  - PIVs     Disposition:  - Medical  ICU    Patient seen and findings/plan discussed with medical ICU staff, Dr. Jean.    Tomas Rolle    ====================================  INTERVAL HISTORY:   Overnight the patient's NJ tube became clogged and unfunctional. Patient denies acute discomfort this AM. She denies any acute changes or concerns at this time.     OBJECTIVE:   1. VITAL SIGNS:   Temp:  [97.5  F (36.4  C)-98.2  F (36.8  C)] 98.2  F (36.8  C)  Pulse:  [] 94  Resp:  [24-28] 26  BP: ()/(55-77) 89/66  FiO2 (%):  [50 %-70 %] 50 %  SpO2:  [86 %-98 %] 93 %  Ventilation Mode: CPAP/PS  (Continuous positive airway pressure with Pressure Support)  FiO2 (%): 50 %  Rate Set (breaths/minute): 24 breaths/min  Tidal Volume Set (mL): 320 mL  PEEP (cm H2O): 8 cmH2O  Pressure Support (cm H2O): 7 cmH2O  Oxygen Concentration (%): 50 %  Resp: 26    2. INTAKE/ OUTPUT:   I/O last 3 completed shifts:  In: 1350 [I.V.:880; NG/GT:190]  Out: 3000 [Other:3000]    3. PHYSICAL EXAMINATION:  General: Laying in bed. No acute distress. Sedated and intubated  HEENT: PERRLA. Following commands. Responding appropriately. No focal deficits.  Neuro: A&Ox3, NAD  Pulm/Resp: Breath sounds coarse/dim throughout but improved  CV: RRR  Abdomen: Soft, non-distended, non-tender  Incisions/Skin: Left AV fistula with thrill and bruit    4. LABS:   Arterial Blood Gases   Recent Labs   Lab 01/27/21  0540 01/27/21  0316 01/27/21  0147 01/24/21  1529   PH 7.39 7.41 7.39 7.36   PCO2 41 38 40 29*   PO2 79* 59* 45* 177*   HCO3 25 24 24 16*     Complete Blood Count   Recent Labs   Lab 01/29/21  0416 01/28/21  0412 01/27/21  0414 01/26/21  0425   WBC 9.7 9.3 15.2* 16.9*   HGB 9.5* 10.3* 10.8* 10.5*    166 231 252     Basic Metabolic Panel  Recent Labs   Lab 01/29/21  0416 01/28/21  0412 01/27/21  0414 01/26/21 0425    136 135 134   POTASSIUM 3.4 3.7 3.8 4.0   CHLORIDE 100 103 101 100   CO2 26 22 23 19*   BUN 28 53* 44* 72*   CR 2.76* 3.77* 3.18* 4.45*   * 182* 172*  276*     Liver Function Tests  Recent Labs   Lab 01/29/21  0416 01/28/21  0412 01/27/21  0414 01/26/21  0425   AST 9 10 15 11   ALT 21 26 30 30   ALKPHOS 135 164* 171* 184*   BILITOTAL 0.5 0.6 0.8 0.8   ALBUMIN 1.9* 1.8* 2.2* 2.0*     Coagulation Profile  No lab results found in last 7 days.    5. RADIOLOGY:   Recent Results (from the past 24 hour(s))   XR Feeding Tube Reposition    Narrative    Fluoroscopic guided feeding tube placement    INDICATION: NG tube advanced with fluoroscopy, could not get tube past  the pylorus sphincter on 1/27    COMPARISON: 1/27/2021    FINDINGS: 40.4 seconds fluoroscopy time were utilized. Feeding tube  was in the stomach but postpyloric placement was requested. Wires  manipulated and the feeding tube advanced into the distal duodenum. No  acute complication. Tube was secured by nasal bridle.    Dr. Issa, faculty, was also present in the room during the  entire examination.      Impression    IMPRESSION: Successful feeding tube postpyloric placement into the  distal most duodenum.    TERRI ISSA MD

## 2021-01-29 NOTE — PLAN OF CARE
SLP: Bedside swallow eval orders received. Attempted to see pt x2, however pt initially busy with TF adjustment by radiologist and then desatting with nursing cares per RN. Will follow up as appropriate.

## 2021-01-29 NOTE — PLAN OF CARE
ICU End of Shift Summary. See flowsheets for vital signs and detailed assessment.    Changes this shift: Runs of SVT, self-limiting. Passed pressure support trial this AM, extubated to high flow nasal cannula at 1031 to maintain sats > 88%. High flow titrated up over the course of the day to 100%, 70lpm with sats 85-88%. MICU updated and assessed bedside. ABG resulted and switched to bipap with improvement in SpO2. Mentation remains clear. Feeding tube repositioned by IR today, briddle string found broken and tube retracted from 90 to 60cm. Feeding tube secured to nose and MICU notified. Chest/abdomen xray ordered to verify placement and confirmed to be in the stomach. OK to use for medications overnight and team will reassess postpyloric tube tomorrow.  Failed bedside swallow study with RN due to coughing. SLP consulted, plan to evaluate tomorrow given feeding tube replacement by IR today. Liquid stool x 3, incontinent.     Plan: Notify team for SpO2 < 88% and/or altered mentation.     Problem: Adult Inpatient Plan of Care  Goal: Plan of Care Review  Outcome: Improving     Problem: Hypertension Comorbidity  Goal: Blood Pressure in Desired Range  Outcome: Improving     Problem: ARDS (Acute Respiratory Distress Syndrome)  Goal: Effective Oxygenation  Outcome: Declining

## 2021-01-29 NOTE — PROGRESS NOTES
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  Patient was admitted to OSH on  for acute hypoxemic respiratory failure and new lung opacities. Intubated  and transferred to [facility] for ongoing management.  Extubated  but reintubated  for progressive hypoxemia and increased infiltrates on imaging. Tolerated PST  AM and extubated to HFNC 60%FiO2.     Today's recommendations:  - Tacrolimus level on  (ordered).  - Beta D glucan pending - she has had invasive fungal infections and has been chronically elevated but trending down with treatment.  If significantly elevated would support diagnosis of PJP PNA.   - ABX per MICU, agree with vancomycin de escalation. Recommend transitioning Zosyn to ceftriaxone for Eikenella coverage. Continue Bactrim for PJP and Steno coverage  - Agree with Prednisone 40mg BID and continuing treatment for PJP PNA given significant hypoxemia.   - Continue VGCV for CMV ppx with stress dose steroids    Acute hypoxemic respiratory failure:   Right post-obstructive Stenotrophomonas pneumonia:  Septic shock: Presented to OSH ED with increased SOB and hypoxia, initially requiring 4L NC but continued to decline and subsequently intubated .  Hemodynamic instability after intubation requiring pressors, transferred to [facility].  Afebrile but with leukocytosis.  COVID and respiratory panel negative.  PTA bronch (20) with moderate airway obstruction  and RML/RLL mucous plugging, cultures with Stenotrophomonas and Eikenella (IV ceftazidime 1/13-1/19).  OSH CT chest with new multifocal GGO bilaterally and increased left loculated pleural effusion.  Repeat bronch (1/24) with RUL BAL with thick copious secretions to RML/RLL.  CXR (1/25) with stable opacities and small bilateral pleural effusions. S/p thoracentsis (1/25) as below. Extubated to 4L on 1/26.  Reintubated 1/27 for progressive hypoxemia, tolerated PST 1/29 AM and extubated to HFNC 60%FiO2/60LPM.  - Blood cultures and fungal blood cultures (1/24) NGTD  - BAL cultures (1/24) with PJP PCR  - Beta D glucan pending - she has had invasive fungal infections and has been chronically elevated but trending down with treatment. If significantly elevated would support diagnosis of PJP PNA.  - ABX per MICU: agree with vancomycin de-escalation. Recommend transitioning Zosyn to ceftriaxone for Eikenella coverage. Continue Bactrim for PJP, and Steno coverage  - Stress dose steroids and taper per MICU  - Pulmonary toilet with chest physiotherapy QID and nebs: Duoneb QID and Mucomyst QID     Aspergillus empyema (left-sided): First noted 10/8/19.  CT scan on 7/17/20 with increased mass-like density, likely pleural-based, in LLL area s/p needle aspiration (8/13/20) with Aspergillus fumigatus on cultures s/p intrapleural bead placement (amphoterecin, 11/20).  Following with ID as OP.  LFTs stable on admission (ALP chronically mildly elevated).  CT chest with increased loculated left pleural effusion s/p thoracentesis (1/25), exudative with 87% neutrophils, very high LDL (6308), cytology normal.  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 Recent CMV (12/9) and DSA (1/25) not detected.  Continued  right mainstem stenosis noted with PTA bronch on 12/23/20, mild on 1/24 bronch.     Immunosuppression: On 2 drug IST d/t recurrent infections  - Tacrolimus 1 mg qAM / 0.5 mg qPM.  Goal level 8-10.  Next level 1/31 (ordered).  - Holding chronic prednisone while on stress dose steroids (PTA 5mg qAM and 2.5mg qPM)     Prophylaxis:   - Bactrim treatment dose as above. PTA was not on PJP ppx since 7/28/20 as CD4>200.      H/o CMV viremia: CMV D+/R+.  CMV <137 (1/25).  - VGCV for CMV ppx with stress dose steroids (1/26)      EBV viremia: Last level 12/9/20 mildly elevated to 10K (log 4) from prior 3K (3.5 log) on 9/9/20.  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 Chronic iHD M/Th via AVF with partial graft. iHD run 1/22 and again on 1/26, primarily for clearance (350 ml UF).  - Monitoring of tacrolimus as above  - Dialysis management per nephrology     We appreciate the excellent care provided by the MICU team. Recommendations communicated via this note. Will continue to follow along closely, please do not hesitate to call with any questions or concerns.    Patient discussed with Dr. Da Ibarra, APRN, CNP   Inpatient Nurse Practitioner  Pulmonary CF/Transplant  Pager [phone]     Subjective & Interval History:     Currently on PS with peep 7 on 50%FiO2. Reports breathing is comfortable. Dyspnea has been improving. Productive cough with thick tan sputum. Remains afebrile and HD stable. Denies HA, chills, aches, sweats, chest pain, reflux, nausea, or abdominal pain. Stools x2 yesterday, soft. Aneuric. Dialysis yesterday with 3L UF removed. NJ tube was advanced per radiology yesterday to postpyloric. Unfortunately last evening it became clogged after medications, and TF was stopped.    Review of Systems:     ROS as above, otherwise limited due to intubation    Physical Exam:     Vital signs:  Temp: 98.2  F (36.8  C) Temp src: Axillary BP: 97/65 Pulse: 98   Resp: 26 SpO2: 90 % O2  "Device: High Flow Nasal Cannula (HFNC) Oxygen Delivery: 60 LPM Height: 160 cm (5' 2.99\") Weight: 64.1 kg (141 lb 5 oz)  I/O:     Intake/Output Summary (Last 24 hours) at 1/29/2021 1130  Last data filed at 1/29/2021 0700  Gross per 24 hour   Intake 950 ml   Output 3000 ml   Net -2050 ml     Constitutional: Lying in bed, in no apparent distress.   HEENT: Eyes with pink conjunctivae, anicteric. Oral mucosa moist without lesions. Neck supple without lymphadenopathy.   PULM: Good air flow bilaterally. Crackles to bases bilaterally, no rhonchi, no wheezes. Non-labored breathing on PS.  CV: Normal S1 and S2. RRR. No murmur, gallop, or rub. 1+ BLE and edema to hands   ABD: NABS, soft, nontender, nondistended.    MSK: Moves all extremities. No apparent muscle wasting.   NEURO: Alert and oriented, conversant.   SKIN: Warm, dry. No rash on limited exam.   PSYCH: Mood stable.     Lines, Drains, and Devices:  CVC Double Lumen 10/25/19 Right Internal jugular (Active)   Number of days: 462       Hemodialysis Vascular Access Arteriovenous fistula Left Arm (Active)   Site Assessment WDL;Thrill present;Bruit present 01/29/21 0800   Cannulation Needle Size 16 01/28/21 1050   Dressing Intervention New dressing 01/28/21 1520   Dressing Status Clean, dry, intact 01/29/21 0800   Verification by x-ray Not applicable 01/28/21 1520   Hand Off Report Yes 01/28/21 1520   Number of days: 4       CVC TRIPLE LUMEN Right Internal jugular (Active)   Site Assessment WDL 01/29/21 0800   Dressing Type Chlorhexidine sponge;Transparent 01/29/21 0800   Dressing Status clean;dry;intact 01/29/21 0800   Dressing Intervention dressing changed 01/25/21 1800   Dressing Change Due 01/31/21 01/29/21 0800   Line Necessity yes, meets criteria 01/29/21 0800   Blue - Status infusing 01/29/21 0800   Blue - Cap Change Due 02/02/21 01/29/21 0800   Brown - Status saline locked 01/29/21 0800   Brown - Cap Change Due 02/02/21 01/29/21 0800   White - Status infusing " 01/29/21 0800   White - Cap Change Due 02/02/21 01/29/21 0800   Phlebitis Scale 0-->no symptoms 01/29/21 0800   Infiltration? no 01/29/21 0800   Infiltration Scale 0 01/28/21 1600   Infiltration Site Treatment Method  None 01/29/21 0800   CVC Comment CDI 01/29/21 0800   Number of days: 5     Data:     LABS    CMP:   Recent Labs   Lab 01/29/21 0416 01/28/21 0412 01/27/21  0414 01/26/21  0425 01/25/21  0406 01/24/21  1653    136 135 134 135  --    POTASSIUM 3.4 3.7 3.8 4.0 4.1 3.8   CHLORIDE 100 103 101 100 101  --    CO2 26 22 23 19* 16*  --    ANIONGAP 11 11 11 15* 17*  --    * 182* 172* 276* 187*  --    BUN 28 53* 44* 72* 58*  --    CR 2.76* 3.77* 3.18* 4.45* 3.73*  --    GFRESTIMATED 18* 12* 15* 10* 13*  --    GFRESTBLACK 21* 14* 18* 12* 15*  --    CHELSEY 8.4* 7.9* 8.4* 8.0* 8.1*  --    MAG  --   --  2.2 2.8* 2.5* 2.5*   PHOS  --   --  5.1* 7.0* 6.8* 6.5*   PROTTOTAL 5.3* 5.3* 6.0* 6.0*  6.0*  --   --    ALBUMIN 1.9* 1.8* 2.2* 2.0*  --   --    BILITOTAL 0.5 0.6 0.8 0.8  --   --    ALKPHOS 135 164* 171* 184*  --   --    AST 9 10 15 11  --   --    ALT 21 26 30 30  --   --      CBC:   Recent Labs   Lab 01/29/21 0416 01/28/21 0412 01/27/21  0414 01/26/21  0425   WBC 9.7 9.3 15.2* 16.9*   RBC 3.21* 3.39* 3.56* 3.45*   HGB 9.5* 10.3* 10.8* 10.5*   HCT 28.3* 31.2* 32.1* 29.7*   MCV 88 92 90 86   MCH 29.6 30.4 30.3 30.4   MCHC 33.6 33.0 33.6 35.4   RDW 14.5 14.7 14.5 13.7    166 231 252       INR: No lab results found in last 7 days.    Glucose:   Recent Labs   Lab 01/29/21  0416 01/29/21  0414 01/29/21  0009 01/28/21  2008 01/28/21  1502 01/28/21  1228 01/28/21  0917 01/28/21  0412 01/27/21  0414 01/27/21  0414 01/26/21  0425 01/26/21  0425 01/25/21  0406 01/25/21  0406 01/24/21  1458 01/24/21  1458   *  --   --   --   --   --   --  182*  --  172*  --  276*  --  187*  --  155*   BGM  --  167* 145* 145* 167* 167* 172*  --    < >  --    < >  --    < >  --    < > 150*    < > = values in this  interval not displayed.       Blood Gas:   Recent Labs   Lab 01/29/21  0416 01/28/21  0404 01/27/21  0540 01/26/21  0425 01/26/21  0425   PHV 7.54* 7.36  --   --  7.42   PCO2V 35* 44  --   --  33*   PO2V 37 44  --   --  49*   HCO3V 30* 25  --   --  21   LASHAUN 6.8  --   --   --   --    O2PER 50.0 50.0 50.0   < > 45.0    < > = values in this interval not displayed.       Culture Data   Recent Labs   Lab 01/25/21  1347 01/24/21  1841 01/24/21  1830 01/24/21  1729 01/24/21  1652 01/24/21  1629   CULT Culture negative after 3 days  Culture received and in progress.  Positive AFB results are called as soon as detected.    Final report to follow in 7 to 8 weeks.    Assayed at [facility] [address], [place] [address] [phone]  Culture negative monitoring continues  Culture negative monitoring continues No growth after 5 days No growth after 4 days No growth No growth after 5 days No growth after 4 days  Culture negative after 4 days  Culture received and in progress.  Positive AFB results are called as soon as detected.    Final report to follow in 7 to 8 weeks.    Assayed at [facility] [address] [place] [address] [phone]  No growth  Culture negative monitoring continues       Virology Data:   Lab Results   Component Value Date    FLUAH1 Not Detected 01/24/2021    FLUAH3 Not Detected 01/24/2021    TO6175 Not Detected 01/24/2021    IFLUB Not Detected 01/24/2021    RSVA Not Detected 01/24/2021    RSVB Not Detected 01/24/2021    PIV1 Not Detected 01/24/2021    PIV2 Not Detected 01/24/2021    PIV3 Not Detected 01/24/2021    HMPV Not Detected 01/24/2021    HRVS Negative 01/24/2021    ADVBE Negative 01/24/2021    ADVC Negative 01/24/2021    ADVC Negative 12/23/2020    ADVC Negative 10/07/2019       Historical CMV results (last 3 of prior testing):  Lab Results   Component Value Date    CMVQNT <137 (A) 01/25/2021    CMVQNT 238 (A) 01/24/2021    CMVQNT 675 (A) 12/23/2020     Lab  Results   Component Value Date    CMVLOG <2.1 01/25/2021    CMVLOG 2.4 (H) 01/24/2021    CMVLOG 2.8 (H) 12/23/2020       Urine Studies    Recent Labs   Lab Test 01/24/21  1729 10/21/19  2240   URINEPH 5.0 5.0   NITRITE Negative Negative   LEUKEST Moderate* Large*   WBCU 34* 115*       Most Recent [place]reeze Pulmonary Function Testing (FVC/FEV1 only)  FVC-Pre   Date Value Ref Range Status   12/09/2020 1.12 L    09/09/2020 1.56 L    03/09/2020 1.57 L    02/19/2020 1.78 L      FVC-%Pred-Pre   Date Value Ref Range Status   12/09/2020 34 %    09/09/2020 47 %    03/09/2020 48 %    02/19/2020 54 %      FEV1-Pre   Date Value Ref Range Status   12/09/2020 0.98 L    09/09/2020 1.10 L    03/09/2020 0.96 L    02/19/2020 0.99 L      FEV1-%Pred-Pre   Date Value Ref Range Status   12/09/2020 37 %    09/09/2020 42 %    03/09/2020 37 %    02/19/2020 38 %        IMAGING    Recent Results (from the past 48 hour(s))   XR Feeding Tube Placement    Narrative    Feeding tube placement.    Comparison: Abdominal x-ray dated 1/26/2021.    History: Feeding tube needed for nutrition. Unsuccessful attempt to  place feeding tube at bedside using Enteral access system (Cortrak)    Fluoroscopy time:  13.3 minute[s]    Technique: After injection of lidocaine gel into the right nostril,  attempted to advance previously placed cortrak feeding tube under  fluoroscopic guidance. Despite numerous attempts with multiple  guidewires, this was unsuccessful. Cortrak feeding tube was removed  and a new feeding tube was placed and advanced under fluoroscopic  guidance.    Findings: The feeding tube is advanced as far as possible. Despite  numerous attempts, the tip was unable to be advanced beyond the  pyloric sphincter. Feeding tube positioned with tip near the pylorus,  likely abutting the pyloric sphincter. A small amount of barium,  water, and air was injected to define anatomy. Guidewire was removed  and placed with plastic bag attached to patient  whiteboard. Feeding  tube secured with nasal bridle and flushed with water.      Impression    Impression: Feeding tube placement with tip advanced as far as  possible; positioned with tip abutting the pyloric sphincter within  the gastric lumen. Due to upper limits of recommended fluoroscopy  time, further attempts at placement deferred until following day.  Feeding tube was secured in place with additional length of tube  within the gastric lumen in anticipation of duodenal advancement  secondary to peristalsis. May consider obtaining follow-up abdominal  x-ray in a.m. to reevaluate position.    I have personally reviewed the examination and initial interpretation  and I agree with the findings.    ANA CRISOSTOMO, DO   XR Feeding Tube Reposition    Narrative    Fluoroscopic guided feeding tube placement    INDICATION: NG tube advanced with fluoroscopy, could not get tube past  the pylorus sphincter on 1/27    COMPARISON: 1/27/2021    FINDINGS: 40.4 seconds fluoroscopy time were utilized. Feeding tube  was in the stomach but postpyloric placement was requested. Wires  manipulated and the feeding tube advanced into the distal duodenum. No  acute complication. Tube was secured by nasal bridle.    Dr. Issa, faculty, was also present in the room during the  entire examination.      Impression    IMPRESSION: Successful feeding tube postpyloric placement into the  distal most duodenum.    TERRI ISSA MD

## 2021-01-29 NOTE — PLAN OF CARE
Problem: Adult Inpatient Plan of Care  Goal: Plan of Care Review  Outcome: No Change  Goal: Absence of Hospital-Acquired Illness or Injury  Intervention: Identify and Manage Fall Risk  Recent Flowsheet Documentation  Taken 1/29/2021 0400 by Silvana Cardona RN  Safety Promotion/Fall Prevention:   activity supervised   assistive device/personal items within reach   room door open   room near nurse's station   room organization consistent   safety round/check completed   clutter free environment maintained   fall prevention program maintained  Taken 1/29/2021 0000 by Silvana Cardona RN  Safety Promotion/Fall Prevention:   activity supervised   assistive device/personal items within reach   room door open   room near nurse's station   room organization consistent   safety round/check completed   clutter free environment maintained   fall prevention program maintained  Taken 1/28/2021 2000 by Silvana Cardona RN  Safety Promotion/Fall Prevention:   activity supervised   assistive device/personal items within reach   room door open   room near nurse's station   room organization consistent   safety round/check completed   clutter free environment maintained   fall prevention program maintained  Intervention: Prevent Skin Injury  Recent Flowsheet Documentation  Taken 1/29/2021 0400 by Silvana Cardona RN  Body Position: turned  Taken 1/29/2021 0000 by Silvana Cardona RN  Body Position: turned  Taken 1/28/2021 2000 by Silvana Cardona RN  Body Position: turned  Intervention: Prevent and Manage VTE (Venous Thromboembolism) Risk  Recent Flowsheet Documentation  Taken 1/29/2021 0400 by Silvana Cardona RN  VTE Prevention/Management:   bleeding precautions maintained   anticoagulant therapy maintained   bleeding risk assessed   dorsiflexion/plantar flexion performed   AROM (active range of motion) performed  Taken 1/29/2021 0000 by Silvana Cardona RN  VTE Prevention/Management:   bleeding precautions  maintained   anticoagulant therapy maintained   bleeding risk assessed   dorsiflexion/plantar flexion performed   AROM (active range of motion) performed  Taken 1/28/2021 2000 by Silvana Cardona RN  VTE Prevention/Management:   bleeding precautions maintained   anticoagulant therapy maintained   bleeding risk assessed   dorsiflexion/plantar flexion performed   AROM (active range of motion) performed

## 2021-01-29 NOTE — PROGRESS NOTES
"CLINICAL NUTRITION SERVICES - REASSESSMENT NOTE     Nutrition Prescription    RECOMMENDATIONS FOR MDs/PROVIDERS TO ORDER:  Re-initate enteral nutrition via feeding tube (once unkinked, if radiologist is able to do so) per orders.  Infuse TF even if unable to obtain duodenal position (if FT needs to be replaced) given pt has had inadequate nutrition x 10 days and not safe for diet advancement.   Recommend continuing enteral nutrition until diet is advanced further (solid foods where pt can consume a decent amt of kcal/PRO) and pt is able to consistently consume at least 1000 kcal and 55 g pro daily (~60% of estimated needs).     Malnutrition Status:    Severe malnutrition in the context of acute on chronic illness    Recommendations already ordered by Registered Dietitian (RD):  Discussed nutrition POC with MICU team. Discussed repositioning FT (using fluoro; radiologist needs to be reconsulted for this) with MD. He is in agreement with this vs pulling the FT out as it may just be kinked.    Future/Additional Recommendations:  Order Boost Plus shakes (chocolate) once able to take more PO intake safely.       EVALUATION OF THE PROGRESS TOWARD GOALS   Diet: Full Liquid (vol Teaspoons at a time)  Nutrition Support: Novasource Renal @ 20ml/hr to provide 960 kcals, 44 g protein, 88 g CHO, 0g fiber and 344 ml free H2O (trophic rate)    Intake: 4 day average (1/25-1/28) from TF provided 524kcals and 38g protein. This met 36% of minimum calorie needs and 54% minimum protein needs.      - No intake data recorded in food records d/t extubation with reintubation 1/26-1/27    Per pt 1/29/21: Pt reports poor appetite. Pt reports mild discomfort (\"scratchy\" sensation) from the feeding tube but otherwise no issues from TF.      NEW FINDINGS   SLP assessment 1/26: Recommend pt cautiously initiate full liquids (thin consistency) via spoon only. Pt re-intubated 1/27.    GI: Per discussion with RN, patient was complaining of " constipation prior to intubation per Medicine 1/27. Last BM 1/28, constipation noted in flowsheets.    Enteral access: RD unable to place post-pyloric FT 1/26. Radiology attempted post-pyloric FT placement x 2, successful on second attempt on 1/28.      - 1/28 @ 2100: feeding tube did not flush freely, assume FT kinked (charted as clogged by RN, but this is unlikely as FT was in place for such a short period of time).    Labs:  Urea Nitrogen 28 (wnl) 1/29, previously elevated  Creatinine 2.76 (H) 1/29, trending down   Phosphorus 5.1 (H) 1/27, trending down    Meds:  Nephrocaps  Miralax and senna  Tacrolimus- monitor K+    Wt: Wt has increased 12 lb (9% body wt) in past week - most likely d/t fluid shifts, true wt gain unlikely given inadequate nutrition during stay  01/28/21 0000 64.1 kg (141 lb 5 oz)   01/26/21 0515 60.9 kg (134 lb 4.2 oz)   01/25/21 0400 60.4 kg (133 lb 2.5 oz)   01/24/21 1419 58.6 kg (129 lb 3 oz)     MALNUTRITION  % Intake: </= 50% for >/= 5 days   % Weight Loss: None noted  Subcutaneous Fat Loss: Facial region:  moderate  Muscle Loss: Temporal:  moderate, Facial & jaw region:  moderate, Scapular bone:  mild, Thoracic region (clavicle, acromium bone, deltoid, trapezius, pectoral):  moderate, Upper arm (bicep, tricep):  mild, Lower arm (forearm):  mild and Posterior calf:  moderate  Fluid Accumulation/Edema: None noted  Malnutrition Diagnosis: Severe malnutrition in the context of acute on chronic illness    Previous Goals   Total avg nutritional intake to meet a minimum of 25 kcal/kg and 1.2 gm PRO/kg daily (per dosing wt 59 kg admit wt).  Evaluation: Not met    Previous Nutrition Diagnosis  Inadequate oral intake related to suspect respiratory failure, hindering ability to advance diet as evidenced by NPO status today, plan to start TF?  Evaluation: No longer applicable, nutrition diagnosis changed below    CURRENT NUTRITION DIAGNOSIS  Inadequate protein-energy intake related to frequent TF  interruptions and trophic feeds only as evidenced by 4 day average (1/25-1/28) TF intake providing 524 kcals and 38 g protein daily which met 36% of minimum calorie needs and 54% minimum protein needs, and noted muscle wasting on physical exam.        INTERVENTIONS  Implementation  Collaboration with other providers: interdisciplinary rounds - recommended consulting Radiology to attempt to reposition TF (done)    Goals  Total avg nutritional intake to meet a minimum of 25 kcal/kg and 1.2 g PRO/kg daily (per dosing wt 59 kg).    Monitoring/Evaluation  Progress toward goals will be monitored and evaluated per protocol.    Mirella Warner  Dietetic Intern     I have read and agree with the above re-assessment, evaluation, interventions and recommendations.  Katie Sewell, RD, LD  ([facility] dietitian, [phone] (Mon-Fri))

## 2021-01-29 NOTE — PLAN OF CARE
ICU End of Shift Summary. See flowsheets for vital signs and detailed assessment.    Changes this shift: Pt had an uneventful night and slept between cares, has been stable on her current vent settings, PEEP of 8 and 50%.  Pt does occasionally have short bursts of self limiting svt, not new.  Pt's feeding tube was found to be very hard to flush at start of shift and clogged at 2100, MD aware. Pt continues on the precedex sedation study and appears to be tolerating that well, see flowsheets.     Plan: Continue to monitor and update MD with concerns and changes.  Replace feeding tube and or convert meds to IV if able today.     Problem: Adult Inpatient Plan of Care  Goal: Plan of Care Review  1/29/2021 0628 by Silvana Cardona, RN  Outcome: No Change  1/29/2021 0627 by Silvana Cardona, RN  Outcome: No Change  Goal: Absence of Hospital-Acquired Illness or Injury  Intervention: Identify and Manage Fall Risk  Recent Flowsheet Documentation  Taken 1/29/2021 0400 by Silvana Cardona, RN  Safety Promotion/Fall Prevention:   activity supervised   assistive device/personal items within reach   room door open   room near nurse's station   room organization consistent   safety round/check completed   clutter free environment maintained   fall prevention program maintained  Taken 1/29/2021 0000 by Silvana Cardona, RN  Safety Promotion/Fall Prevention:   activity supervised   assistive device/personal items within reach   room door open   room near nurse's station   room organization consistent   safety round/check completed   clutter free environment maintained   fall prevention program maintained  Taken 1/28/2021 2000 by Silvana Cardona, RN  Safety Promotion/Fall Prevention:   activity supervised   assistive device/personal items within reach   room door open   room near nurse's station   room organization consistent   safety round/check completed   clutter free environment maintained   fall prevention program  maintained  Intervention: Prevent Skin Injury  Recent Flowsheet Documentation  Taken 1/29/2021 0400 by Silvana Cardona RN  Body Position: turned  Taken 1/29/2021 0000 by Silvana Cardona RN  Body Position: turned  Taken 1/28/2021 2000 by Silvana Cardona RN  Body Position: turned  Intervention: Prevent and Manage VTE (Venous Thromboembolism) Risk  Recent Flowsheet Documentation  Taken 1/29/2021 0400 by Silvana Cardona RN  VTE Prevention/Management:   bleeding precautions maintained   anticoagulant therapy maintained   bleeding risk assessed   dorsiflexion/plantar flexion performed   AROM (active range of motion) performed  Taken 1/29/2021 0000 by Silvana Cardona RN  VTE Prevention/Management:   bleeding precautions maintained   anticoagulant therapy maintained   bleeding risk assessed   dorsiflexion/plantar flexion performed   AROM (active range of motion) performed  Taken 1/28/2021 2000 by Silvana Cardona RN  VTE Prevention/Management:   bleeding precautions maintained   anticoagulant therapy maintained   bleeding risk assessed   dorsiflexion/plantar flexion performed   AROM (active range of motion) performed

## 2021-01-29 NOTE — PROVIDER NOTIFICATION
At 20, the small bore feeding tube was sluggish when administering the liquid bactrim and valcyte and water flush.    At 2100, writer went to flush the tube prior to administering melatonin for sleep and the feeding tube did not flush freely, so tube feeding stopped and clog zapper solution dwelled in place for an hour.   The tube did not flush.  MD called to inform that the feeding tube was occluded.

## 2021-01-30 ENCOUNTER — APPOINTMENT (OUTPATIENT)
Dept: CT IMAGING | Facility: CLINIC | Age: 61
End: 2021-01-30
Attending: INTERNAL MEDICINE
Payer: COMMERCIAL

## 2021-01-30 LAB
1,3 BETA GLUCAN SER-MCNC: 261 PG/ML
ANION GAP SERPL CALCULATED.3IONS-SCNC: 11 MMOL/L (ref 3–14)
B-D GLUCAN INTERPRETATION (1,3): POSITIVE
BACTERIA SPEC CULT: NO GROWTH
BUN SERPL-MCNC: 44 MG/DL (ref 7–30)
CALCIUM SERPL-MCNC: 8.8 MG/DL (ref 8.5–10.1)
CHLORIDE SERPL-SCNC: 99 MMOL/L (ref 94–109)
CO2 SERPL-SCNC: 24 MMOL/L (ref 20–32)
CREAT SERPL-MCNC: 3.72 MG/DL (ref 0.52–1.04)
ERYTHROCYTE [DISTWIDTH] IN BLOOD BY AUTOMATED COUNT: 14.9 % (ref 10–15)
GFR SERPL CREATININE-BSD FRML MDRD: 13 ML/MIN/{1.73_M2}
GLUCOSE BLDC GLUCOMTR-MCNC: 134 MG/DL (ref 70–99)
GLUCOSE BLDC GLUCOMTR-MCNC: 136 MG/DL (ref 70–99)
GLUCOSE BLDC GLUCOMTR-MCNC: 139 MG/DL (ref 70–99)
GLUCOSE BLDC GLUCOMTR-MCNC: 144 MG/DL (ref 70–99)
GLUCOSE BLDC GLUCOMTR-MCNC: 146 MG/DL (ref 70–99)
GLUCOSE BLDC GLUCOMTR-MCNC: 161 MG/DL (ref 70–99)
GLUCOSE SERPL-MCNC: 152 MG/DL (ref 70–99)
HCT VFR BLD AUTO: 32.2 % (ref 35–47)
HGB BLD-MCNC: 10.7 G/DL (ref 11.7–15.7)
MCH RBC QN AUTO: 29.6 PG (ref 26.5–33)
MCHC RBC AUTO-ENTMCNC: 33.2 G/DL (ref 31.5–36.5)
MCV RBC AUTO: 89 FL (ref 78–100)
PLATELET # BLD AUTO: 198 10E9/L (ref 150–450)
POTASSIUM SERPL-SCNC: 3.5 MMOL/L (ref 3.4–5.3)
RBC # BLD AUTO: 3.62 10E12/L (ref 3.8–5.2)
SODIUM SERPL-SCNC: 134 MMOL/L (ref 133–144)
SPECIMEN SOURCE: NORMAL
WBC # BLD AUTO: 11.5 10E9/L (ref 4–11)

## 2021-01-30 PROCEDURE — 250N000012 HC RX MED GY IP 250 OP 636 PS 637: Performed by: NURSE PRACTITIONER

## 2021-01-30 PROCEDURE — 85027 COMPLETE CBC AUTOMATED: CPT | Performed by: STUDENT IN AN ORGANIZED HEALTH CARE EDUCATION/TRAINING PROGRAM

## 2021-01-30 PROCEDURE — 250N000013 HC RX MED GY IP 250 OP 250 PS 637: Performed by: NURSE PRACTITIONER

## 2021-01-30 PROCEDURE — 250N000009 HC RX 250: Performed by: NURSE PRACTITIONER

## 2021-01-30 PROCEDURE — 250N000013 HC RX MED GY IP 250 OP 250 PS 637: Performed by: INTERNAL MEDICINE

## 2021-01-30 PROCEDURE — 250N000011 HC RX IP 250 OP 636: Performed by: NURSE PRACTITIONER

## 2021-01-30 PROCEDURE — 70450 CT HEAD/BRAIN W/O DYE: CPT

## 2021-01-30 PROCEDURE — 258N000003 HC RX IP 258 OP 636: Performed by: CLINICAL NURSE SPECIALIST

## 2021-01-30 PROCEDURE — 93005 ELECTROCARDIOGRAM TRACING: CPT

## 2021-01-30 PROCEDURE — 250N000011 HC RX IP 250 OP 636: Performed by: CLINICAL NURSE SPECIALIST

## 2021-01-30 PROCEDURE — 94640 AIRWAY INHALATION TREATMENT: CPT

## 2021-01-30 PROCEDURE — 99291 CRITICAL CARE FIRST HOUR: CPT | Mod: GC | Performed by: INTERNAL MEDICINE

## 2021-01-30 PROCEDURE — 90935 HEMODIALYSIS ONE EVALUATION: CPT | Performed by: INTERNAL MEDICINE

## 2021-01-30 PROCEDURE — 90937 HEMODIALYSIS REPEATED EVAL: CPT

## 2021-01-30 PROCEDURE — 99233 SBSQ HOSP IP/OBS HIGH 50: CPT | Performed by: PHYSICIAN ASSISTANT

## 2021-01-30 PROCEDURE — 250N000012 HC RX MED GY IP 250 OP 636 PS 637: Performed by: INTERNAL MEDICINE

## 2021-01-30 PROCEDURE — P9041 ALBUMIN (HUMAN),5%, 50ML: HCPCS | Performed by: CLINICAL NURSE SPECIALIST

## 2021-01-30 PROCEDURE — 250N000009 HC RX 250: Performed by: CLINICAL NURSE SPECIALIST

## 2021-01-30 PROCEDURE — 999N000157 HC STATISTIC RCP TIME EA 10 MIN

## 2021-01-30 PROCEDURE — 94640 AIRWAY INHALATION TREATMENT: CPT | Mod: 76

## 2021-01-30 PROCEDURE — 250N000013 HC RX MED GY IP 250 OP 250 PS 637: Performed by: STUDENT IN AN ORGANIZED HEALTH CARE EDUCATION/TRAINING PROGRAM

## 2021-01-30 PROCEDURE — 200N000002 HC R&B ICU UMMC

## 2021-01-30 PROCEDURE — 80048 BASIC METABOLIC PNL TOTAL CA: CPT | Performed by: STUDENT IN AN ORGANIZED HEALTH CARE EDUCATION/TRAINING PROGRAM

## 2021-01-30 PROCEDURE — 999N000185 HC STATISTIC TRANSPORT TIME EA 15 MIN

## 2021-01-30 PROCEDURE — 258N000003 HC RX IP 258 OP 636: Performed by: NURSE PRACTITIONER

## 2021-01-30 PROCEDURE — 999N001017 HC STATISTIC GLUCOSE BY METER IP

## 2021-01-30 PROCEDURE — 94660 CPAP INITIATION&MGMT: CPT

## 2021-01-30 PROCEDURE — 250N000011 HC RX IP 250 OP 636: Performed by: STUDENT IN AN ORGANIZED HEALTH CARE EDUCATION/TRAINING PROGRAM

## 2021-01-30 PROCEDURE — 70450 CT HEAD/BRAIN W/O DYE: CPT | Mod: 26 | Performed by: RADIOLOGY

## 2021-01-30 PROCEDURE — 93010 ELECTROCARDIOGRAM REPORT: CPT | Performed by: INTERNAL MEDICINE

## 2021-01-30 RX ORDER — HEPARIN SODIUM 1000 [USP'U]/ML
500 INJECTION, SOLUTION INTRAVENOUS; SUBCUTANEOUS CONTINUOUS
Status: DISCONTINUED | OUTPATIENT
Start: 2021-01-30 | End: 2021-01-30

## 2021-01-30 RX ORDER — AMOXICILLIN 250 MG
2 CAPSULE ORAL 2 TIMES DAILY PRN
Status: DISCONTINUED | OUTPATIENT
Start: 2021-01-30 | End: 2021-02-25 | Stop reason: HOSPADM

## 2021-01-30 RX ORDER — HEPARIN SODIUM 1000 [USP'U]/ML
500 INJECTION, SOLUTION INTRAVENOUS; SUBCUTANEOUS
Status: COMPLETED | OUTPATIENT
Start: 2021-01-30 | End: 2021-01-30

## 2021-01-30 RX ORDER — ACETAMINOPHEN 325 MG/1
325 TABLET ORAL EVERY 4 HOURS PRN
Status: DISCONTINUED | OUTPATIENT
Start: 2021-01-30 | End: 2021-02-25 | Stop reason: HOSPADM

## 2021-01-30 RX ORDER — ALBUMIN, HUMAN INJ 5% 5 %
250 SOLUTION INTRAVENOUS
Status: COMPLETED | OUTPATIENT
Start: 2021-01-30 | End: 2021-01-30

## 2021-01-30 RX ADMIN — Medication 12.5 MG: at 20:28

## 2021-01-30 RX ADMIN — BUDESONIDE 0.5 MG: 0.5 INHALANT ORAL at 08:58

## 2021-01-30 RX ADMIN — SODIUM CHLORIDE 300 ML: 9 INJECTION, SOLUTION INTRAVENOUS at 09:50

## 2021-01-30 RX ADMIN — INSULIN ASPART 1 UNITS: 100 INJECTION, SOLUTION INTRAVENOUS; SUBCUTANEOUS at 04:22

## 2021-01-30 RX ADMIN — IPRATROPIUM BROMIDE AND ALBUTEROL SULFATE 3 ML: .5; 3 SOLUTION RESPIRATORY (INHALATION) at 19:30

## 2021-01-30 RX ADMIN — HEPARIN SODIUM 5000 UNITS: 5000 INJECTION, SOLUTION INTRAVENOUS; SUBCUTANEOUS at 00:42

## 2021-01-30 RX ADMIN — HEPARIN SODIUM 500 UNITS: 1000 INJECTION INTRAVENOUS; SUBCUTANEOUS at 09:53

## 2021-01-30 RX ADMIN — HEPARIN SODIUM 5000 UNITS: 5000 INJECTION, SOLUTION INTRAVENOUS; SUBCUTANEOUS at 08:21

## 2021-01-30 RX ADMIN — ACETYLCYSTEINE 2 ML: 200 SOLUTION ORAL; RESPIRATORY (INHALATION) at 19:31

## 2021-01-30 RX ADMIN — CEFTRIAXONE 2 G: 2 INJECTION, POWDER, FOR SOLUTION INTRAMUSCULAR; INTRAVENOUS at 12:56

## 2021-01-30 RX ADMIN — INSULIN ASPART 1 UNITS: 100 INJECTION, SOLUTION INTRAVENOUS; SUBCUTANEOUS at 15:44

## 2021-01-30 RX ADMIN — PREDNISONE 40 MG: 20 TABLET ORAL at 20:29

## 2021-01-30 RX ADMIN — TACROLIMUS 0.5 MG: 5 CAPSULE ORAL at 18:20

## 2021-01-30 RX ADMIN — ACETYLCYSTEINE 2 ML: 200 SOLUTION ORAL; RESPIRATORY (INHALATION) at 08:58

## 2021-01-30 RX ADMIN — INSULIN ASPART 1 UNITS: 100 INJECTION, SOLUTION INTRAVENOUS; SUBCUTANEOUS at 20:26

## 2021-01-30 RX ADMIN — ACETYLCYSTEINE 2 ML: 200 SOLUTION ORAL; RESPIRATORY (INHALATION) at 15:18

## 2021-01-30 RX ADMIN — TACROLIMUS 1 MG: 5 CAPSULE ORAL at 08:20

## 2021-01-30 RX ADMIN — BUDESONIDE 0.5 MG: 0.5 INHALANT ORAL at 19:30

## 2021-01-30 RX ADMIN — ALBUMIN HUMAN 250 ML: 0.05 INJECTION, SOLUTION INTRAVENOUS at 09:51

## 2021-01-30 RX ADMIN — HEPARIN SODIUM 5000 UNITS: 5000 INJECTION, SOLUTION INTRAVENOUS; SUBCUTANEOUS at 15:44

## 2021-01-30 RX ADMIN — SODIUM CHLORIDE 300 MG: 9 INJECTION, SOLUTION INTRAVENOUS at 08:00

## 2021-01-30 RX ADMIN — ACETAMINOPHEN 325 MG: 325 TABLET, FILM COATED ORAL at 23:26

## 2021-01-30 RX ADMIN — Medication 5 ML: at 08:20

## 2021-01-30 RX ADMIN — LIDOCAINE HYDROCHLORIDE 0.5 ML: 10 INJECTION, SOLUTION EPIDURAL; INFILTRATION; INTRACAUDAL; PERINEURAL at 09:52

## 2021-01-30 RX ADMIN — Medication 10 MG: at 20:28

## 2021-01-30 RX ADMIN — IPRATROPIUM BROMIDE AND ALBUTEROL SULFATE 3 ML: .5; 3 SOLUTION RESPIRATORY (INHALATION) at 15:18

## 2021-01-30 RX ADMIN — HEPARIN SODIUM 500 UNITS/HR: 1000 INJECTION INTRAVENOUS; SUBCUTANEOUS at 09:56

## 2021-01-30 RX ADMIN — Medication 40 MG: at 08:20

## 2021-01-30 RX ADMIN — PREDNISONE 40 MG: 20 TABLET ORAL at 08:20

## 2021-01-30 RX ADMIN — SULFAMETHOXAZOLE AND TRIMETHOPRIM 295 MG: 200; 40 SUSPENSION ORAL at 08:20

## 2021-01-30 RX ADMIN — SULFAMETHOXAZOLE AND TRIMETHOPRIM 295 MG: 200; 40 SUSPENSION ORAL at 20:29

## 2021-01-30 RX ADMIN — IPRATROPIUM BROMIDE AND ALBUTEROL SULFATE 3 ML: .5; 3 SOLUTION RESPIRATORY (INHALATION) at 08:58

## 2021-01-30 RX ADMIN — Medication 12.5 MG: at 15:26

## 2021-01-30 ASSESSMENT — ACTIVITIES OF DAILY LIVING (ADL)
ADLS_ACUITY_SCORE: 15

## 2021-01-30 ASSESSMENT — MIFFLIN-ST. JEOR: SCORE: 1170

## 2021-01-30 NOTE — PROGRESS NOTES
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 She was transferred to OSH ICU 1/22 and intubated and requiring vasopressors. 1/24 transferred to [facility] Medical ICU service for ongoing management. Outside chest CT showed bilateral infiltrates. Antibiotic treatment with vanc, zosyn, and bactrim (steno history). She was bronched with no specific growth on BAL ( with neutrophil predominance) however BAL PJP PCR was positive (returned 1/28), fungitell was then checked afterwards and found to be elevated ~250. Bactrim was continued and steroids were added (prednisone 40 mg BID). Left diagnostic thoracentesis was done from left empyema which returned exudative with inflammatory cells but no specific growth. Blood and urine cultures have remained negative. IgE was not elevated.  She was extubated on 1/26/2021, however, patient re-intubated on same day due to worsening O2 requirement. CXR with worsened consolidations and concern for possible edema. BNP was 28,000. 2D echo showed preserved EF. CT chest was concerning for alveolar and interstitial opacities. Ventilator liberation was likely unsuccessful due to fluid overload/pulmonary edema in the setting of underlying PJP PNA. Had HD session on 1/28 with removal of 3 L of fluid and was extubated yesterday. Had slight increased WOB overnight and was placed on BIPAP. Another HD session today with removal of ~2 L of fluid. Will continue to monitor closely in the ICU.     The patient was seen and examined with the resident/fellow physician.   We have discussed the patient in detail and I agree with the findings, assessment, and plan as documented when this note was cosigned on this day. The plan was formulated in conjunction with pharmacy, ICU nurses, and respiratory therapist. I have evaluated all laboratory values and imaging studies for the past 24 hours. I have reviewed all the consults that have been ordered and are active for this patient.      Critical Care Time: 30 min.  I spent this time (excluding procedures) personally providing and directing critical care services at the bedside and on the critical care unit.      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 She was transferred to OSH ICU 1/22 and intubated and requiring vasopressors. 1/24 transferred to [facility] Medical ICU service for ongoing management.     CHANGES and MAJOR THINGS TODAY:   -NJT retracted into stomach, will advance TF as tolerated. Currently at 20 mL/hr. Will advance to 40 going up by 5 every 4 hours with monitoring of stool output. Plan discussed with Pulm attending  -Continue BiPAP weaning FiO2 as tolerated  -Can transition to CPAP if patient has trouble tolerating BiPAP  -Continue treatment with bactrim, prednisone taper  -Continue empiric antibiotic treatment with ceftriaxone  -Will obtain CTH given uneven pupils    PLAN:     Neuro:  # Pain and sedation  No acute issues    # Insomnia  - Melatonin ordered      Pulmonary:  # Acute  Hypoxic Respiratory Failure  Suspected 2/2 pneumonia. OSH CT 1/23 + bilateral ground glass opacities and consolidative lung infiltrates centrally distributed to the upper lobes and RLL, . Covid-19 negative X3 at OSH.   -Patient extubated 1/29, now on BiPAP  -Continue BiPAP weaning FiO2 as tolerated  -Can transition to CPAP if patient has trouble tolerating BiPAP  -Continue micro management as below     # Unequal pupils  Neuro exam unremarkable otherwise. Patient reports history of issues requiring monthly injections in her eyes.  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  - Continue PTA tacrolimus  - Hold PTA prednisone while on steroids for PCP  - Pulmonary lung transplant team consulted, appreciate recs     Cardiovascular:  # Shock  # HX Hypertension  Patient was transferred on Levophed. Right heart catheterization 03/10/2020 concluded right sided filling pressures mildly elevated, mild PHTN, mild right sided filling pressures, and normal cardiac output. Last ECHO 10/21/20 with EF 60-65%, normal LV & RV function.   - Patient weaned off pressors 1/26 currently hemodynamically stable off pressors  - Hold PTA coreg and amlodipine     # AF w/ RVR  Patient with pAF with rates into 130-40s. Likely in the setting of acute pulmoary illness  -Metoprolol 12.5 BID ordered     GI/Nutrition:  #Portal HTN  Liver biopsy positive for congestive hepatopathy. Previous had ascites thought to be r/t elevated right sided heart pressures. Last saw GI on 12/21/20 and patient endorsed that her ascites is no longer present.   - NTD     #Nutrition  - NJT retracted into stomach, will advance TF as tolerated. Currently at 20 mL/hr. Will advance to 40 going up by 5 every 4 hours with monitoring of  stool output. Plan discussed with Pulm attending    # Constipation  Patient reportedly has history of constipation with bactrim use, now stooling  - Scheduled miralax ordered  - PRN dulcolax and senna-doc ordered     Renal/Fluids/Electrolytes:  # ESRD on iHD twice weekly (M/Th).   # Anion gap metabolic acidosis r/t ESRD, uremia (OSH ABG 7.31/37/120/19 & Anion Gap 25.7)   Outpatient dry body weight 56.5 kg. Ran dialysis 1/26, 1/28  - Nephrology Consulted   - Continued dialysis per neph recs  - I&O  - Daily Weights   - Trend BMP      Endocrine:  # Seronegative RA with Raynaud's   - NTD, monitor      High intensity sliding scale insulin ordered given steroids     ID:  # Sepsis   Suspected related to bacterial pneumonia. OSH CT 1/23 + bilateral ground glass opacities and consolidative lung infiltrates centrally distributed to the upper lobes and RLL. Covid-19 negative X3 at OSH. Procalcitonin negative. Urine strep, CMV, and RPP negative.  - PCP PCR positive, fungitell positive as well  - Continue treatment with bactrim, prednisone taper  - Bronch completed, KOH and Gram not revealing,  with neutrophil predominance, other labs pending  - s/p thora 1/25, fluid exudative, micro pending  - F/U pending micro w/u   - Continue empiric antibiotic treatment with ceftriaxone, plan for 7 day course     # Chronic/Stable right aspergillus empyema   - Continue PTA posaconazole, will monitor QTc     Hematology:    # Anemia r/t renal disease, chronic stable   # Thrombocytopenia r/t critical illness, resolved   # Leukocytosis, infection/steroids  Takes aranesp 25 mcg every four weeks, last dose 01/14.    - Daily CBC     Musculoskeletal:  - PT/OT when appropriate      Skin:  # Dermatomyositis antitryptase syndrome   - Noted, NTD     General Cares/Prophylaxis:    DVT Prophylaxis: Heparin SQ  GI Prophylaxis: PPI  Restraints: Not indicated  Family Communication:  updated at bedside  Code Status: Full       Lines/tubes/drains:  - Basilio  - CVC  - PIVs     Disposition:  - Medical ICU    Patient seen and findings/plan discussed with medical ICU staff, Dr. Jean.    Tomas Bearemma    ====================================  INTERVAL HISTORY:   Overnight the patient's NJ tube retracted into her stomach. Patient was placed on BiPAP for worsening hypoxia overnight as well. Patient denies acute discomfort this AM. She is tolerating BiPAP well. She denies any acute changes or concerns at this time.     OBJECTIVE:   1. VITAL SIGNS:   Temp:  [97.8  F (36.6  C)-99.1  F (37.3  C)] 99.1  F (37.3  C)  Pulse:  [] 141  Resp:  [21-31] 29  BP: ()/(59-87) 99/73  FiO2 (%):  [80 %-100 %] 80 %  SpO2:  [85 %-97 %] 93 %  Ventilation Mode: CPAP/PS  (Continuous positive airway pressure with Pressure Support)  FiO2 (%): 80 %  Rate Set (breaths/minute): 24 breaths/min  Tidal Volume Set (mL): 320 mL  PEEP (cm H2O): 8 cmH2O  Pressure Support (cm H2O): 7 cmH2O  Oxygen Concentration (%): 50 %  Resp: 29    2. INTAKE/ OUTPUT:   I/O last 3 completed shifts:  In: 740 [I.V.:510; NG/GT:230]  Out: 3000 [Other:3000]    3. PHYSICAL EXAMINATION:  General: Laying in bed. No acute distress. On BiPAP  HEENT: PERRLA. Following commands. Responding appropriately. No focal deficits.  Neuro: A&Ox3, non focal. Reflexes intact. Right pupil dilated without constriction to light (left constricts when light shined in right  Pulm/Resp: Breath significantly less coarse than prior  CV: RRR  Abdomen: Soft, non-distended, non-tender  Incisions/Skin: Left AV fistula with thrill and bruit    4. LABS:   Arterial Blood Gases   Recent Labs   Lab 01/29/21  1547 01/27/21  0540 01/27/21  0316 01/27/21  0147   PH 7.46* 7.39 7.41 7.39   PCO2 41 41 38 40   PO2 56* 79* 59* 45*   HCO3 29* 25 24 24     Complete Blood Count   Recent Labs   Lab 01/30/21  0417 01/29/21  0416 01/28/21  0412 01/27/21  0414   WBC 11.5* 9.7 9.3 15.2*   HGB 10.7* 9.5* 10.3* 10.8*    176 166 231      Basic Metabolic Panel  Recent Labs   Lab 01/30/21  0417 01/29/21  2321 01/29/21  0416 01/28/21  0412 01/27/21  0414     --  137 136 135   POTASSIUM 3.5 3.3* 3.4 3.7 3.8   CHLORIDE 99  --  100 103 101   CO2 24  --  26 22 23   BUN 44*  --  28 53* 44*   CR 3.72*  --  2.76* 3.77* 3.18*   *  --  164* 182* 172*     Liver Function Tests  Recent Labs   Lab 01/29/21  0416 01/28/21  0412 01/27/21  0414 01/26/21  0425   AST 9 10 15 11   ALT 21 26 30 30   ALKPHOS 135 164* 171* 184*   BILITOTAL 0.5 0.6 0.8 0.8   ALBUMIN 1.9* 1.8* 2.2* 2.0*     Coagulation Profile  No lab results found in last 7 days.    5. RADIOLOGY:   Recent Results (from the past 24 hour(s))   XR Chest Port 1 View    Narrative    Exam: XR CHEST PORT 1 VW, 1/29/2021 5:33 PM    Indication: worsening respiratory distress & OGT placement    Comparison: Chest CT 1/27/2020, chest x-ray 1/27/2020    Findings:   Semiupright AP view of the chest. Right IJ central venous catheter tip  is overlying the cavoatrial junction. Enteric tube passes below the  diaphragm with tip located in the region of the stomach. Enteric  contrast is seen overlying the stomach. Post surgical changes of the  chest with median sternotomy wires and multiple surgical clips  overlying the cardiac mediastinal silhouette. Extensive bilateral  pulmonary airspace opacities, predominantly in the lower lungs.  Bilateral dense retrocardiac opacities. Cardiac silhouette is  obscured. No definite pneumothorax. Right loculated pleural effusion.  Probable small left pleural effusion.      Impression    Impression:   1. The endotracheal tube has been removed. Right IJ central venous  catheter is in stable position. There is an  enteric tube which  terminates in the region of the stomach.  2. Extensive bilateral pulmonary airspace opacities and increasing  consolidation at the lung bases.    I have personally reviewed the examination and initial interpretation  and I agree with the  findings.    MEHNAZ CHAPMAN MD   CT Head w/o Contrast    Narrative    CT HEAD W/O CONTRAST 1/30/2021 2:43 PM    Provided History: Neuro deficit, acute, stroke suspected; fixed  dilated R pupil  ICD-10:    Comparison: None.    Technique: Using multidetector thin collimation helical acquisition  technique, axial, coronal and sagittal CT images from the skull base  to the vertex were obtained without intravenous contrast.     Findings:    No intracranial hemorrhage, mass effect, or midline shift. The  ventricles are proportionate to the cerebral sulci. The gray to white  matter differentiation of the cerebral hemispheres is preserved. The  basal cisterns are patent. Mild periventricular white matter  hypodensity consistent with chronic small vessel ischemic disease.    Mild high right frontoparietal dural calcification/ossification. The  visualized paranasal sinuses are clear. Mild dependent bilateral  mastoid effusions.       Impression    Impression: No acute intracranial pathology.    I have personally reviewed the examination and initial interpretation  and I agree with the findings.    NEAL LOPES MD

## 2021-01-30 NOTE — PLAN OF CARE
4C PT cx: patient requesting to rest at time of contact following dialysis, later in PM patient in/out of afib with RVR. PT to cancel at this time.

## 2021-01-30 NOTE — PROGRESS NOTES
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  Patient was admitted to OSH on  for acute hypoxemic respiratory failure and new lung opacities. Intubated  and transferred to [facility] for ongoing management.  Extubated  but reintubated  for progressive hypoxemia and increased infiltrates on imaging. Tolerated PST  AM and extubated to HFNC 60%FiO2, however, therapy escalated to BIPAP, currently stable at 85% FiO2.      Recommendations:  - IgG level tomorrow (ordered)  - Tacrolimus level on  (ordered)  - Continue ceftriaxone for Eikenella coverage; Bactrim for PJP and Steno coverage  - Steroid burst and taper as planned    Acute hypoxemic respiratory failure:   Right post-obstructive Stenotrophomonas and Eikenella pneumonia:  PJP pneumonia:  Septic shock: Psented to OSH ED with increased SOB and hypoxia, initially requiring 4L NC but continued to decline and subsequently intubated . Hemodynamic instability after intubation requiring pressors, transferred to [facility].  Afebrile but with leukocytosis.  COVID and respiratory panel negative.  PTA bronch (20) with moderate airway obstruction and RML/RLL mucous plugging, cultures with Stenotrophomonas and Eikenella (IV ceftazidime -). OSH CT chest with new multifocal GGO bilaterally and increased left loculated pleural effusion.  Repeat bronch () with RUL BAL with thick copious secretions to RML/RLL. + PJP PCR  from BAL on 1/24. S/p thoracentsis (1/25)), cultures NGTD. BD glucan slightly elevated at 261 (from 106), but within her recent range. Extubated to 4L on 1/26.  Reintubated 1/27 for progressive hypoxemia, tolerated PST 1/29 AM and extubated to HFNC 60%FiO2/60LPM, had increased O2 requirements, so placed on BiPAP, currently at 80% FiO2 and tolerating it well.   - Blood cultures and fungal blood cultures (1/24) NGTD  - BAL cultures (1/24) with PJP PCR  - ABX per MICU: ceftriaxone for Eikenella coverage; continue Bactrim for PJP and Steno coverage  - Stress dose steroids and taper per MICU  - Pulmonary toilet with chest physiotherapy QID and nebs: Duoneb QID, Mucomyst QID and Pulmicort BID     Aspergillus empyema (left-sided): First noted 10/8/19. CT scan on 7/17/20 with increased mass-like density, likely pleural-based, in LLL area s/p needle aspiration (8/13/20) with Aspergillus fumigatus on cultures s/p intrapleural amphotericin bead placement (11/20).  Following with ID as OP.  LFTs stable on admission (ALP chronically mildly elevated). CT chest with increased loculated left pleural effusion s/p thoracentesis (1/25), exudative with 87% neutrophils, very high LDL (6308), cytology normal. Cultures NGTD.  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 DSA (1/25) not detected. Continued right mainstem stenosis noted with PTA bronch on 12/23/20, mild on 1/24 bronch.  - Monitor for worsening hypoxia, consider IP evaluation of stenosis     Immunosuppression: On 2 drug IST d/t recurrent infections  - Tacrolimus 1 mg qAM / 0.5 mg qPM.  Goal level 8-10.  Next level 1/31 (ordered)  - Holding chronic prednisone while on stress dose steroids (PTA 5mg qAM and 2.5mg qPM)     Prophylaxis:   - Bactrim  "treatment dose as above. PTA was not on PJP ppx since 7/28/20 as CD4 > 200.      H/o CMV viremia: CMV D+/R+.  CMV <137 (1/25).  - VGCV for CMV ppx with stress dose steroids (1/26)      EBV viremia: Last level 12/9/20 mildly elevated to 10K (log 4) from prior 3K (3.5 log) on 9/9/20. EBV (1/25) 5619 (log of 3.8).  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 Chronic iHD M/Th via AVF with partial graft. Plan for dialysis today, primarily for clearance (350 ml UF).  - Monitoring of tacrolimus as above  - Dialysis management per nephrology     We appreciate the excellent care provided by the MICU team. Recommendations communicated via this note. Will continue to follow along closely, please do not hesitate to call with any questions or concerns.    Patient discussed with Dr. Da Chow PA-C  Pulmonary CF/Transplant  [id number]    Subjective & Interval History:     Awake, josi she is feeling comfortable on bipap, no shortness of breath, fever or chills. Denies pain, no nausea or vomiting.  at bedside and notes she looks more stable, notes there were some issues with nasal feeding tube placement yesterday, planning for HD today. No swelling in her legs.    Review of Systems:     Please see interval history, otherwise 10 point review is negative.    Physical Exam:     Vital signs:  Temp: 98.6  F (37  C) Temp src: Axillary BP: 98/68 Pulse: 137   Resp: 22 SpO2: 91 % O2 Device: BiPAP/CPAP Oxygen Delivery: 70 LPM Height: 160 cm (5' 2.99\") Weight: 62.1 kg (136 lb 14.5 oz)  I/O:     Intake/Output Summary (Last 24 hours) at 1/29/2021 1130  Last data filed at 1/29/2021 0700  Gross per 24 hour   Intake 950 ml   Output 3000 ml   Net -2050 ml     Constitutional: Awake, Lying in bed, critically ill appearing   HEENT: Eyes with pink conjunctivae, anicteric. BiPAP mask in place. Neck supple without lymphadenopathy.   PULM: Moderate breath sounds, scattered coarse crackles  CV: Normal S1 and S2. RRR. No murmur. 1+ " BLE and edema to hands   ABD: NABS, soft, nontender, nondistended.    MSK: Moves all extremities. No apparent muscle wasting.   NEURO: Shakes head yes/no appriopriately to questions  SKIN: Warm, dry. No rash on limited exam.   PSYCH: Mood stable.     Lines, Drains, and Devices:  CVC Double Lumen 10/25/19 Right Internal jugular (Active)   Number of days: 462       Hemodialysis Vascular Access Arteriovenous fistula Left Arm (Active)   Site Assessment WDL;Thrill present;Bruit present 01/29/21 0800   Cannulation Needle Size 16 01/28/21 1050   Dressing Intervention New dressing 01/28/21 1520   Dressing Status Clean, dry, intact 01/29/21 0800   Verification by x-ray Not applicable 01/28/21 1520   Hand Off Report Yes 01/28/21 1520   Number of days: 4       CVC TRIPLE LUMEN Right Internal jugular (Active)   Site Assessment WDL 01/29/21 0800   Dressing Type Chlorhexidine sponge;Transparent 01/29/21 0800   Dressing Status clean;dry;intact 01/29/21 0800   Dressing Intervention dressing changed 01/25/21 1800   Dressing Change Due 01/31/21 01/29/21 0800   Line Necessity yes, meets criteria 01/29/21 0800   Blue - Status infusing 01/29/21 0800   Blue - Cap Change Due 02/02/21 01/29/21 0800   Brown - Status saline locked 01/29/21 0800   Brown - Cap Change Due 02/02/21 01/29/21 0800   White - Status infusing 01/29/21 0800   White - Cap Change Due 02/02/21 01/29/21 0800   Phlebitis Scale 0-->no symptoms 01/29/21 0800   Infiltration? no 01/29/21 0800   Infiltration Scale 0 01/28/21 1600   Infiltration Site Treatment Method  None 01/29/21 0800   CVC Comment CDI 01/29/21 0800   Number of days: 5     Data:     LABS    CMP:   Recent Labs   Lab 01/30/21  0417 01/29/21  2321 01/29/21  0416 01/28/21  0412 01/27/21  0414 01/26/21  0425 01/25/21  0406 01/24/21  1653     --  137 136 135 134 135  --    POTASSIUM 3.5 3.3* 3.4 3.7 3.8 4.0 4.1 3.8   CHLORIDE 99  --  100 103 101 100 101  --    CO2 24  --  26 22 23 19* 16*  --    ANIONGAP 11   --  11 11 11 15* 17*  --    *  --  164* 182* 172* 276* 187*  --    BUN 44*  --  28 53* 44* 72* 58*  --    CR 3.72*  --  2.76* 3.77* 3.18* 4.45* 3.73*  --    GFRESTIMATED 13*  --  18* 12* 15* 10* 13*  --    GFRESTBLACK 15*  --  21* 14* 18* 12* 15*  --    CHELSEY 8.8  --  8.4* 7.9* 8.4* 8.0* 8.1*  --    MAG  --  1.9  --   --  2.2 2.8* 2.5* 2.5*   PHOS  --   --   --   --  5.1* 7.0* 6.8* 6.5*   PROTTOTAL  --   --  5.3* 5.3* 6.0* 6.0*  6.0*  --   --    ALBUMIN  --   --  1.9* 1.8* 2.2* 2.0*  --   --    BILITOTAL  --   --  0.5 0.6 0.8 0.8  --   --    ALKPHOS  --   --  135 164* 171* 184*  --   --    AST  --   --  9 10 15 11  --   --    ALT  --   --  21 26 30 30  --   --      CBC:   Recent Labs   Lab 01/30/21 0417 01/29/21 0416 01/28/21 0412 01/27/21 0414   WBC 11.5* 9.7 9.3 15.2*   RBC 3.62* 3.21* 3.39* 3.56*   HGB 10.7* 9.5* 10.3* 10.8*   HCT 32.2* 28.3* 31.2* 32.1*   MCV 89 88 92 90   MCH 29.6 29.6 30.4 30.3   MCHC 33.2 33.6 33.0 33.6   RDW 14.9 14.5 14.7 14.5    176 166 231       INR: No lab results found in last 7 days.    Glucose:   Recent Labs   Lab 01/30/21 0417 01/30/21 0416 01/30/21 0040 01/29/21 2131 01/29/21  1631 01/29/21  1246 01/29/21  0416 01/29/21  0414 01/28/21  0412 01/28/21  0412 01/27/21  0414 01/27/21  0414 01/26/21  0425 01/26/21  0425 01/25/21  0406 01/25/21  0406   *  --   --   --   --   --  164*  --   --  182*  --  172*  --  276*  --  187*   BGM  --  146* 136* 141* 122* 113*  --  167*   < >  --    < >  --    < >  --    < >  --     < > = values in this interval not displayed.       Blood Gas:   Recent Labs   Lab 01/29/21  1547 01/29/21  0416 01/28/21  0404 01/26/21  0425 01/26/21  0425   PHV  --  7.54* 7.36  --  7.42   PCO2V  --  35* 44  --  33*   PO2V  --  37 44  --  49*   HCO3V  --  30* 25  --  21   LASHAUN  --  6.8  --   --   --    O2PER 100 50.0 50.0   < > 45.0    < > = values in this interval not displayed.       Culture Data   Recent Labs   Lab 01/25/21  2992  01/24/21  1841 01/24/21  1830 01/24/21  1729 01/24/21  1652 01/24/21  1629   CULT No growth  Culture negative after 4 days  Culture received and in progress.  Positive AFB results are called as soon as detected.    Final report to follow in 7 to 8 weeks.    Assayed at [facility] [address] [facility] [place] [address] [phone]  Culture negative monitoring continues No growth No growth after 5 days No growth No growth No growth after 5 days  Culture negative after 4 days  Culture received and in progress.  Positive AFB results are called as soon as detected.    Final report to follow in 7 to 8 weeks.    Assayed at [facility] [address] [place] [address] [phone]  No growth  Culture negative monitoring continues       Virology Data:   Lab Results   Component Value Date    FLUAH1 Not Detected 01/24/2021    FLUAH3 Not Detected 01/24/2021    GB2312 Not Detected 01/24/2021    IFLUB Not Detected 01/24/2021    RSVA Not Detected 01/24/2021    RSVB Not Detected 01/24/2021    PIV1 Not Detected 01/24/2021    PIV2 Not Detected 01/24/2021    PIV3 Not Detected 01/24/2021    HMPV Not Detected 01/24/2021    HRVS Negative 01/24/2021    ADVBE Negative 01/24/2021    ADVC Negative 01/24/2021    ADVC Negative 12/23/2020    ADVC Negative 10/07/2019       Historical CMV results (last 3 of prior testing):  Lab Results   Component Value Date    CMVQNT <137 (A) 01/25/2021    CMVQNT 238 (A) 01/24/2021    CMVQNT 675 (A) 12/23/2020     Lab Results   Component Value Date    CMVLOG <2.1 01/25/2021    CMVLOG 2.4 (H) 01/24/2021    CMVLOG 2.8 (H) 12/23/2020       Urine Studies    Recent Labs   Lab Test 01/24/21  1729 10/21/19  2240   URINEPH 5.0 5.0   NITRITE Negative Negative   LEUKEST Moderate* Large*   WBCU 34* 115*       Most Recent [place]ze Pulmonary Function Testing (FVC/FEV1 only)  FVC-Pre   Date Value Ref Range Status   12/09/2020 1.12 L    09/09/2020 1.56 L    03/09/2020 1.57 L    02/19/2020 1.78 L       FVC-%Pred-Pre   Date Value Ref Range Status   12/09/2020 34 %    09/09/2020 47 %    03/09/2020 48 %    02/19/2020 54 %      FEV1-Pre   Date Value Ref Range Status   12/09/2020 0.98 L    09/09/2020 1.10 L    03/09/2020 0.96 L    02/19/2020 0.99 L      FEV1-%Pred-Pre   Date Value Ref Range Status   12/09/2020 37 %    09/09/2020 42 %    03/09/2020 37 %    02/19/2020 38 %        IMAGING    Recent Results (from the past 48 hour(s))   XR Feeding Tube Placement    Narrative    Feeding tube placement.    Comparison: Abdominal x-ray dated 1/26/2021.    History: Feeding tube needed for nutrition. Unsuccessful attempt to  place feeding tube at bedside using Enteral access system (Cortrak)    Fluoroscopy time:  13.3 minute[s]    Technique: After injection of lidocaine gel into the right nostril,  attempted to advance previously placed cortrak feeding tube under  fluoroscopic guidance. Despite numerous attempts with multiple  guidewires, this was unsuccessful. Cortrak feeding tube was removed  and a new feeding tube was placed and advanced under fluoroscopic  guidance.    Findings: The feeding tube is advanced as far as possible. Despite  numerous attempts, the tip was unable to be advanced beyond the  pyloric sphincter. Feeding tube positioned with tip near the pylorus,  likely abutting the pyloric sphincter. A small amount of barium,  water, and air was injected to define anatomy. Guidewire was removed  and placed with plastic bag attached to patient whiteboard. Feeding  tube secured with nasal bridle and flushed with water.      Impression    Impression: Feeding tube placement with tip advanced as far as  possible; positioned with tip abutting the pyloric sphincter within  the gastric lumen. Due to upper limits of recommended fluoroscopy  time, further attempts at placement deferred until following day.  Feeding tube was secured in place with additional length of tube  within the gastric lumen in anticipation of duodenal  advancement  secondary to peristalsis. May consider obtaining follow-up abdominal  x-ray in a.m. to reevaluate position.    I have personally reviewed the examination and initial interpretation  and I agree with the findings.    ANA CRISOSTOMO, DO   XR Feeding Tube Reposition    Narrative    Fluoroscopic guided feeding tube placement    INDICATION: NG tube advanced with fluoroscopy, could not get tube past  the pylorus sphincter on 1/27    COMPARISON: 1/27/2021    FINDINGS: 40.4 seconds fluoroscopy time were utilized. Feeding tube  was in the stomach but postpyloric placement was requested. Wires  manipulated and the feeding tube advanced into the distal duodenum. No  acute complication. Tube was secured by nasal bridle.    Dr. Issa, faculty, was also present in the room during the  entire examination.      Impression    IMPRESSION: Successful feeding tube postpyloric placement into the  distal most duodenum.    TERRI ISSA MD

## 2021-01-30 NOTE — PLAN OF CARE
SLP: Cancel - Attempted to see pt for dysphagia evaluation. Pt is on BiPAP, no immediate plan to remove, pt not appropriate for PO trials. SLP will reschedule as appropriate.

## 2021-01-30 NOTE — PROGRESS NOTES
Nephrology Dialysis Note    This patient was seen and examined while on dialysis. Laboratory results and nurses' notes were reviewed. Extubated and on BiPAP, appears comf. Tolerating treatment thus far.     Targeting 3L UF today. Will evaluate for RRT again on Monday v Tuesday.   No changes to management of anemia, BMD, acidosis, or electrolytes.     Diagnosis - ESRD    HOLLI Solis MD  [phone]

## 2021-01-30 NOTE — PROGRESS NOTES
ICU End of Shift Summary. See flowsheets for vital signs and detailed assessment.    Changes this shift: Remains on 85% Bipap, sats >90%.  TF on hold due to FT in stomach.  HR 's with runs of SVT, PVC couplets and PAC's. HR with increasing ectopy this am.  K 3.3 to 3.5 without replacement.  MD aware of rhythm concerns.     Plan: HD this am.  MD to advise on restarting TF and HR concerns.

## 2021-01-30 NOTE — PROGRESS NOTES
Nutrition Services - Brief Note    Note that FT has slid out d/t malpositioned bridle replacement after FT was re-positioned to post pyloric location by radiologist on 1/28. FT is most likely no longer post pyloric as it retracted 30 cm.    Interventions already implemented by the RD:  None at this time.    Recommendations:  Call radiology, as well as enter a consult for XR feeding tube reposition.   Ensure that a trained RN can re-attach the nasal bridle correctly so that FT can be maintained in the appropriate position.    RD will continue to follow.  Katie Sewell, AMALIA, LD  ([facility] dietitian, 1969 (Mon-Fri))

## 2021-01-30 NOTE — PROGRESS NOTES
HEMODIALYSIS TREATMENT NOTE    Date: 1/30/2021  Time: 1:36 PM    Data:  Pre Wt: 62.1 kg (136 lb 14.5 oz)   Desired Wt: 59.1 kg   Post Wt: (unable to weigh)  Ultrafiltration - Post Run Net Total Removed (mL): 3000 mL  Vascular Access Status: Graft  Patent, BFR at 400ml/min, Hemostasis achieved after 10 mins  Dialyzer Rinse: Streaked, Light  Total Blood Volume Processed: 69.2 liters   Total Dialysis (Treatment) Time: 3 hours    Dialysate Bath: K 3, Ca 2.25  Heparin 500 units loading + 500 units/hr    Lab:   No    Interventions:  Albumin 5% patient prime,   Lidocaine injection adm per Pt's request prior to cannulation,   UF goal adjusted per hemodynamics.     Assessment:  HD access (graft) cannulated with 15g needles without difficulty,   HD well tolerated by Pt,   VSS during tx, afebrile, pt asymptomatic,   Hand-off report given to bedside RN,      Plan:    Next HD per renal team.

## 2021-01-31 ENCOUNTER — APPOINTMENT (OUTPATIENT)
Dept: OCCUPATIONAL THERAPY | Facility: CLINIC | Age: 61
End: 2021-01-31
Attending: INTERNAL MEDICINE
Payer: COMMERCIAL

## 2021-01-31 ENCOUNTER — APPOINTMENT (OUTPATIENT)
Dept: PHYSICAL THERAPY | Facility: CLINIC | Age: 61
End: 2021-01-31
Attending: INTERNAL MEDICINE
Payer: COMMERCIAL

## 2021-01-31 LAB
ANION GAP SERPL CALCULATED.3IONS-SCNC: 11 MMOL/L (ref 3–14)
BASE EXCESS BLDV CALC-SCNC: 2.6 MMOL/L
BUN SERPL-MCNC: 37 MG/DL (ref 7–30)
CALCIUM SERPL-MCNC: 8.8 MG/DL (ref 8.5–10.1)
CHLORIDE SERPL-SCNC: 97 MMOL/L (ref 94–109)
CO2 SERPL-SCNC: 25 MMOL/L (ref 20–32)
CREAT SERPL-MCNC: 3.02 MG/DL (ref 0.52–1.04)
ERYTHROCYTE [DISTWIDTH] IN BLOOD BY AUTOMATED COUNT: 14.9 % (ref 10–15)
GFR SERPL CREATININE-BSD FRML MDRD: 16 ML/MIN/{1.73_M2}
GLUCOSE BLDC GLUCOMTR-MCNC: 164 MG/DL (ref 70–99)
GLUCOSE BLDC GLUCOMTR-MCNC: 167 MG/DL (ref 70–99)
GLUCOSE BLDC GLUCOMTR-MCNC: 201 MG/DL (ref 70–99)
GLUCOSE BLDC GLUCOMTR-MCNC: 216 MG/DL (ref 70–99)
GLUCOSE BLDC GLUCOMTR-MCNC: 245 MG/DL (ref 70–99)
GLUCOSE BLDC GLUCOMTR-MCNC: 256 MG/DL (ref 70–99)
GLUCOSE SERPL-MCNC: 260 MG/DL (ref 70–99)
HCO3 BLDV-SCNC: 27 MMOL/L (ref 21–28)
HCT VFR BLD AUTO: 34.6 % (ref 35–47)
HGB BLD-MCNC: 11.3 G/DL (ref 11.7–15.7)
INTERPRETATION ECG - MUSE: NORMAL
MAGNESIUM SERPL-MCNC: 1.9 MG/DL (ref 1.6–2.3)
MCH RBC QN AUTO: 30 PG (ref 26.5–33)
MCHC RBC AUTO-ENTMCNC: 32.7 G/DL (ref 31.5–36.5)
MCV RBC AUTO: 92 FL (ref 78–100)
O2/TOTAL GAS SETTING VFR VENT: 70 %
PCO2 BLDV: 41 MM HG (ref 40–50)
PH BLDV: 7.43 PH (ref 7.32–7.43)
PHOSPHATE SERPL-MCNC: 3.4 MG/DL (ref 2.5–4.5)
PLATELET # BLD AUTO: 276 10E9/L (ref 150–450)
PO2 BLDV: 50 MM HG (ref 25–47)
POTASSIUM SERPL-SCNC: 3.1 MMOL/L (ref 3.4–5.3)
RBC # BLD AUTO: 3.77 10E12/L (ref 3.8–5.2)
SODIUM SERPL-SCNC: 133 MMOL/L (ref 133–144)
TACROLIMUS BLD-MCNC: 9.7 UG/L (ref 5–15)
TME LAST DOSE: NORMAL H
WBC # BLD AUTO: 11.8 10E9/L (ref 4–11)

## 2021-01-31 PROCEDURE — 97530 THERAPEUTIC ACTIVITIES: CPT | Mod: GP

## 2021-01-31 PROCEDURE — 250N000011 HC RX IP 250 OP 636: Performed by: STUDENT IN AN ORGANIZED HEALTH CARE EDUCATION/TRAINING PROGRAM

## 2021-01-31 PROCEDURE — 94667 MNPJ CHEST WALL 1ST: CPT

## 2021-01-31 PROCEDURE — 250N000012 HC RX MED GY IP 250 OP 636 PS 637: Performed by: INTERNAL MEDICINE

## 2021-01-31 PROCEDURE — 250N000012 HC RX MED GY IP 250 OP 636 PS 637: Performed by: NURSE PRACTITIONER

## 2021-01-31 PROCEDURE — 94640 AIRWAY INHALATION TREATMENT: CPT

## 2021-01-31 PROCEDURE — 80197 ASSAY OF TACROLIMUS: CPT | Performed by: INTERNAL MEDICINE

## 2021-01-31 PROCEDURE — 272N000079 HC NUTRITION PRODUCT RENAL BASIC LITER

## 2021-01-31 PROCEDURE — 250N000013 HC RX MED GY IP 250 OP 250 PS 637: Performed by: NURSE PRACTITIONER

## 2021-01-31 PROCEDURE — 250N000013 HC RX MED GY IP 250 OP 250 PS 637: Performed by: STUDENT IN AN ORGANIZED HEALTH CARE EDUCATION/TRAINING PROGRAM

## 2021-01-31 PROCEDURE — 250N000012 HC RX MED GY IP 250 OP 636 PS 637: Performed by: STUDENT IN AN ORGANIZED HEALTH CARE EDUCATION/TRAINING PROGRAM

## 2021-01-31 PROCEDURE — 250N000009 HC RX 250: Performed by: NURSE PRACTITIONER

## 2021-01-31 PROCEDURE — 99291 CRITICAL CARE FIRST HOUR: CPT | Performed by: INTERNAL MEDICINE

## 2021-01-31 PROCEDURE — 94640 AIRWAY INHALATION TREATMENT: CPT | Mod: 76

## 2021-01-31 PROCEDURE — 84100 ASSAY OF PHOSPHORUS: CPT | Performed by: STUDENT IN AN ORGANIZED HEALTH CARE EDUCATION/TRAINING PROGRAM

## 2021-01-31 PROCEDURE — 82803 BLOOD GASES ANY COMBINATION: CPT | Performed by: STUDENT IN AN ORGANIZED HEALTH CARE EDUCATION/TRAINING PROGRAM

## 2021-01-31 PROCEDURE — 258N000003 HC RX IP 258 OP 636: Performed by: NURSE PRACTITIONER

## 2021-01-31 PROCEDURE — 83735 ASSAY OF MAGNESIUM: CPT | Performed by: STUDENT IN AN ORGANIZED HEALTH CARE EDUCATION/TRAINING PROGRAM

## 2021-01-31 PROCEDURE — 80048 BASIC METABOLIC PNL TOTAL CA: CPT | Performed by: STUDENT IN AN ORGANIZED HEALTH CARE EDUCATION/TRAINING PROGRAM

## 2021-01-31 PROCEDURE — 85027 COMPLETE CBC AUTOMATED: CPT | Performed by: STUDENT IN AN ORGANIZED HEALTH CARE EDUCATION/TRAINING PROGRAM

## 2021-01-31 PROCEDURE — 99233 SBSQ HOSP IP/OBS HIGH 50: CPT | Performed by: PHYSICIAN ASSISTANT

## 2021-01-31 PROCEDURE — 999N001017 HC STATISTIC GLUCOSE BY METER IP

## 2021-01-31 PROCEDURE — 250N000011 HC RX IP 250 OP 636: Performed by: NURSE PRACTITIONER

## 2021-01-31 PROCEDURE — 200N000002 HC R&B ICU UMMC

## 2021-01-31 PROCEDURE — 94668 MNPJ CHEST WALL SBSQ: CPT

## 2021-01-31 PROCEDURE — 999N000157 HC STATISTIC RCP TIME EA 10 MIN

## 2021-01-31 PROCEDURE — 94660 CPAP INITIATION&MGMT: CPT

## 2021-01-31 PROCEDURE — 250N000013 HC RX MED GY IP 250 OP 250 PS 637: Performed by: INTERNAL MEDICINE

## 2021-01-31 PROCEDURE — 97530 THERAPEUTIC ACTIVITIES: CPT | Mod: GO | Performed by: OCCUPATIONAL THERAPIST

## 2021-01-31 PROCEDURE — 82784 ASSAY IGA/IGD/IGG/IGM EACH: CPT | Performed by: STUDENT IN AN ORGANIZED HEALTH CARE EDUCATION/TRAINING PROGRAM

## 2021-01-31 RX ORDER — POTASSIUM CHLORIDE 29.8 MG/ML
20 INJECTION INTRAVENOUS ONCE
Status: COMPLETED | OUTPATIENT
Start: 2021-01-31 | End: 2021-01-31

## 2021-01-31 RX ORDER — METOPROLOL TARTRATE 25 MG/1
25 TABLET, FILM COATED ORAL 2 TIMES DAILY
Status: DISCONTINUED | OUTPATIENT
Start: 2021-01-31 | End: 2021-02-01

## 2021-01-31 RX ADMIN — INSULIN ASPART 1 UNITS: 100 INJECTION, SOLUTION INTRAVENOUS; SUBCUTANEOUS at 20:08

## 2021-01-31 RX ADMIN — Medication 40 MG: at 08:12

## 2021-01-31 RX ADMIN — INSULIN GLARGINE 10 UNITS: 100 INJECTION, SOLUTION SUBCUTANEOUS at 11:23

## 2021-01-31 RX ADMIN — BUDESONIDE 0.5 MG: 0.5 INHALANT ORAL at 07:43

## 2021-01-31 RX ADMIN — INSULIN ASPART 2 UNITS: 100 INJECTION, SOLUTION INTRAVENOUS; SUBCUTANEOUS at 16:36

## 2021-01-31 RX ADMIN — POTASSIUM CHLORIDE 20 MEQ: 29.8 INJECTION, SOLUTION INTRAVENOUS at 11:22

## 2021-01-31 RX ADMIN — TACROLIMUS 1 MG: 5 CAPSULE ORAL at 08:13

## 2021-01-31 RX ADMIN — IPRATROPIUM BROMIDE AND ALBUTEROL SULFATE 3 ML: .5; 3 SOLUTION RESPIRATORY (INHALATION) at 20:24

## 2021-01-31 RX ADMIN — SULFAMETHOXAZOLE AND TRIMETHOPRIM 295 MG: 200; 40 SUSPENSION ORAL at 08:14

## 2021-01-31 RX ADMIN — HEPARIN SODIUM 5000 UNITS: 5000 INJECTION, SOLUTION INTRAVENOUS; SUBCUTANEOUS at 16:27

## 2021-01-31 RX ADMIN — SODIUM CHLORIDE 300 MG: 9 INJECTION, SOLUTION INTRAVENOUS at 08:11

## 2021-01-31 RX ADMIN — IPRATROPIUM BROMIDE AND ALBUTEROL SULFATE 3 ML: .5; 3 SOLUTION RESPIRATORY (INHALATION) at 11:43

## 2021-01-31 RX ADMIN — CEFTRIAXONE 2 G: 2 INJECTION, POWDER, FOR SOLUTION INTRAMUSCULAR; INTRAVENOUS at 11:12

## 2021-01-31 RX ADMIN — SULFAMETHOXAZOLE AND TRIMETHOPRIM 295 MG: 200; 40 SUSPENSION ORAL at 20:10

## 2021-01-31 RX ADMIN — TACROLIMUS 0.5 MG: 5 CAPSULE ORAL at 17:31

## 2021-01-31 RX ADMIN — HEPARIN SODIUM 5000 UNITS: 5000 INJECTION, SOLUTION INTRAVENOUS; SUBCUTANEOUS at 08:12

## 2021-01-31 RX ADMIN — INSULIN ASPART 5 UNITS: 100 INJECTION, SOLUTION INTRAVENOUS; SUBCUTANEOUS at 04:33

## 2021-01-31 RX ADMIN — BUDESONIDE 0.5 MG: 0.5 INHALANT ORAL at 20:24

## 2021-01-31 RX ADMIN — HEPARIN SODIUM 5000 UNITS: 5000 INJECTION, SOLUTION INTRAVENOUS; SUBCUTANEOUS at 00:28

## 2021-01-31 RX ADMIN — Medication 12.5 MG: at 08:13

## 2021-01-31 RX ADMIN — INSULIN ASPART 4 UNITS: 100 INJECTION, SOLUTION INTRAVENOUS; SUBCUTANEOUS at 08:14

## 2021-01-31 RX ADMIN — IPRATROPIUM BROMIDE AND ALBUTEROL SULFATE 3 ML: .5; 3 SOLUTION RESPIRATORY (INHALATION) at 07:43

## 2021-01-31 RX ADMIN — IPRATROPIUM BROMIDE AND ALBUTEROL SULFATE 3 ML: .5; 3 SOLUTION RESPIRATORY (INHALATION) at 15:59

## 2021-01-31 RX ADMIN — ACETYLCYSTEINE 2 ML: 200 SOLUTION ORAL; RESPIRATORY (INHALATION) at 07:43

## 2021-01-31 RX ADMIN — Medication 5 ML: at 08:11

## 2021-01-31 RX ADMIN — PREDNISONE 40 MG: 20 TABLET ORAL at 08:12

## 2021-01-31 RX ADMIN — PREDNISONE 40 MG: 20 TABLET ORAL at 20:10

## 2021-01-31 RX ADMIN — INSULIN ASPART 3 UNITS: 100 INJECTION, SOLUTION INTRAVENOUS; SUBCUTANEOUS at 11:34

## 2021-01-31 RX ADMIN — METOPROLOL TARTRATE 25 MG: 25 TABLET, FILM COATED ORAL at 20:10

## 2021-01-31 RX ADMIN — INSULIN ASPART 5 UNITS: 100 INJECTION, SOLUTION INTRAVENOUS; SUBCUTANEOUS at 00:28

## 2021-01-31 ASSESSMENT — ACTIVITIES OF DAILY LIVING (ADL)
ADLS_ACUITY_SCORE: 15

## 2021-01-31 ASSESSMENT — MIFFLIN-ST. JEOR: SCORE: 1133

## 2021-01-31 NOTE — PROGRESS NOTES
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  Patient was admitted to OSH on  for acute hypoxemic respiratory failure and new lung opacities. Intubated  and transferred to [facility] for ongoing management.  Extubated  but reintubated  for progressive hypoxemia and increased infiltrates on imaging. Tolerated PST  AM and extubated to HFNC 60%FiO2, however, therapy escalated to BIPAP currently, improving slowly from 85% to 60-70% FiO2. Overnight, had issues with atrial fibrillation, started on metoprolol.      Recommendations:  - Recommend reattempt at [place] with radiology--high aspiration risk with BiPAP  - Continue ceftriaxone for Eikenella coverage; Bactrim for PJP and Steno coverage  - Steroid burst and taper as planned  - No tacrolimus dose change; recheck level on Wed (2/3), ordered    Acute hypoxemic respiratory failure:   Right post-obstructive Stenotrophomonas and Eikenella pneumonia:  PJP pneumonia:  Septic shock: Presented to OSH ED with increased SOB and hypoxia, initially requiring 4L NC but continued to decline and subsequently intubated . Hemodynamic instability after intubation requiring pressors, transferred to [facility]. Afebrile but with leukocytosis. COVID and respiratory panel negative. PTA bronch (20) with moderate airway obstruction and RML/RLL mucous plugging, cultures with Stenotrophomonas and Eikenella (IV ceftazidime -). OSH CT chest  with new multifocal GGO bilaterally and increased left loculated pleural effusion. Repeat bronch (1/24) with RUL BAL with thick copious secretions to RML/RLL. + PJP PCR from BAL (1/24). S/p thoracentsis (1/25)), cultures NGTD. BD glucan slightly elevated at 261 (from 106), but within her recent range. Extubated to 4L on 1/26. Reintubated 1/27 for progressive hypoxemia, tolerated PST 1/29 AM and extubated to HFNC 60%FiO2/60LPM, had increased O2 requirements, so placed on BiPAP later (1/29), with slight improvement in O2 requirements, down to 60-75% FiO2. Feeling comfortable currently on BiPAP.   - Blood cultures and fungal blood cultures (1/24) NGTD  - BAL cultures (1/24) with PJP PCR  - ABX per MICU: ceftriaxone for Eikenella coverage; continue Bactrim for PJP and Steno coverage  - Stress dose steroids and taper per MICU  - Pulmonary toilet with chest physiotherapy QID and nebs: Duoneb QID, Mucomyst QID and Pulmicort BID     Aspergillus empyema (left-sided): First noted 10/8/19. CT scan on 7/17/20 with increased mass-like density, likely pleural-based, in LLL area s/p needle aspiration (8/13/20) with Aspergillus fumigatus on cultures s/p intrapleural amphotericin bead placement (11/20).  Following with ID as OP.  LFTs stable on admission (ALP chronically mildly elevated). CT chest with increased loculated left pleural effusion s/p thoracentesis (1/25), exudative with 87% neutrophils, very high LDL (6308), cytology normal. Cultures NGTD.  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 DSA (1/25) not detected. Continued right mainstem stenosis noted with PTA bronch on 12/23/20, mild on 1/24 bronch.  - Monitor for worsening hypoxia, consider IP evaluation of  "stenosis     Immunosuppression: On 2 drug IST d/t recurrent infections  - Tacrolimus 1 mg qAM / 0.5 mg qPM.  Goal level 8-10. Level of 9.7 today at 12 hours; no dose change. Recheck level on Wed, 2/3  - Holding chronic prednisone while on stress dose steroids (5mg qAM and 2.5mg qPM)     Prophylaxis:   - Bactrim treatment dose as above. PTA had not been on PJP ppx since 7/28/20 as CD4 > 200.      H/o CMV viremia: CMV D+/R+.  CMV <137 (1/25).  - VGCV for CMV ppx with stress dose steroids (1/26)      EBV viremia: Last level 12/9/20 mildly elevated to 10K (log 4) from prior 3K (3.5 log) on 9/9/20. EBV (1/25) 5619 (log of 3.8).  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 Chronic iHD M/Th via AVF with partial graft. Dialyzed yesterday.   - Monitoring of tacrolimus as above  - Dialysis management per nephrology     We appreciate the excellent care provided by the MICU team. Recommendations communicated via this note. Will continue to follow along closely, please do not hesitate to call with any questions or concerns.    Patient discussed with Dr. Da Chow PA-C  Pulmonary CF/Transplant  [id number]    Subjective & Interval History:     Awake, denies new shortness of breath, continues to feel comfortable on BiPAP. Plans to work with therapy and get up into the chair today. No fever or chills, no nausea or vomiting, having stools.    Review of Systems:     Please see interval history, otherwise 10 point review is negative.    Physical Exam:     Vital signs:  Temp: 99.2  F (37.3  C) Temp src: Axillary BP: 112/78 Pulse: 135   Resp: 25 SpO2: 92 % O2 Device: BiPAP/CPAP Oxygen Delivery: 70 LPM Height: 160 cm (5' 2.99\") Weight: 58.4 kg (128 lb 12 oz)  I/O:     Intake/Output Summary (Last 24 hours) at 1/29/2021 1130  Last data filed at 1/29/2021 0700  Gross per 24 hour   Intake 950 ml   Output 3000 ml   Net -2050 ml     Constitutional: Awake, Lying in bed, critically ill appearing   HEENT: Eyes with pink " conjunctivae, anicteric. BiPAP mask in place.   PULM: Moderate breath sounds, scattered coarse crackles, improved slightly in bases  CV: Normal S1 and S2. RRR. No murmur. 1+ BLE and edema to hands   ABD: NABS, soft, nontender, nondistended  MSK: Moves all extremities. No apparent muscle wasting  NEURO: Shakes head yes/no appriopriately to questions and can answer questions slowly  SKIN: Warm, dry. No rash on limited exam  PSYCH: Mood stable.     Lines, Drains, and Devices:  CVC Double Lumen 10/25/19 Right Internal jugular (Active)   Number of days: 462       Hemodialysis Vascular Access Arteriovenous fistula Left Arm (Active)   Site Assessment WDL;Thrill present;Bruit present 01/29/21 0800   Cannulation Needle Size 16 01/28/21 1050   Dressing Intervention New dressing 01/28/21 1520   Dressing Status Clean, dry, intact 01/29/21 0800   Verification by x-ray Not applicable 01/28/21 1520   Hand Off Report Yes 01/28/21 1520   Number of days: 4       CVC TRIPLE LUMEN Right Internal jugular (Active)   Site Assessment WDL 01/29/21 0800   Dressing Type Chlorhexidine sponge;Transparent 01/29/21 0800   Dressing Status clean;dry;intact 01/29/21 0800   Dressing Intervention dressing changed 01/25/21 1800   Dressing Change Due 01/31/21 01/29/21 0800   Line Necessity yes, meets criteria 01/29/21 0800   Blue - Status infusing 01/29/21 0800   Blue - Cap Change Due 02/02/21 01/29/21 0800   Brown - Status saline locked 01/29/21 0800   Brown - Cap Change Due 02/02/21 01/29/21 0800   White - Status infusing 01/29/21 0800   White - Cap Change Due 02/02/21 01/29/21 0800   Phlebitis Scale 0-->no symptoms 01/29/21 0800   Infiltration? no 01/29/21 0800   Infiltration Scale 0 01/28/21 1600   Infiltration Site Treatment Method  None 01/29/21 0800   CVC Comment CDI 01/29/21 0800   Number of days: 5     Data:     LABS    CMP:   Recent Labs   Lab 01/31/21  0441 01/30/21  0417 01/29/21  2321 01/29/21  0416 01/28/21  0412 01/27/21  0414  01/26/21  0425 01/25/21  0406 01/24/21  1653    134  --  137 136 135 134 135  --    POTASSIUM 3.1* 3.5 3.3* 3.4 3.7 3.8 4.0 4.1 3.8   CHLORIDE 97 99  --  100 103 101 100 101  --    CO2 25 24  --  26 22 23 19* 16*  --    ANIONGAP 11 11  --  11 11 11 15* 17*  --    * 152*  --  164* 182* 172* 276* 187*  --    BUN 37* 44*  --  28 53* 44* 72* 58*  --    CR 3.02* 3.72*  --  2.76* 3.77* 3.18* 4.45* 3.73*  --    GFRESTIMATED 16* 13*  --  18* 12* 15* 10* 13*  --    GFRESTBLACK 19* 15*  --  21* 14* 18* 12* 15*  --    CHELSEY 8.8 8.8  --  8.4* 7.9* 8.4* 8.0* 8.1*  --    MAG  --   --  1.9  --   --  2.2 2.8* 2.5* 2.5*   PHOS  --   --   --   --   --  5.1* 7.0* 6.8* 6.5*   PROTTOTAL  --   --   --  5.3* 5.3* 6.0* 6.0*  6.0*  --   --    ALBUMIN  --   --   --  1.9* 1.8* 2.2* 2.0*  --   --    BILITOTAL  --   --   --  0.5 0.6 0.8 0.8  --   --    ALKPHOS  --   --   --  135 164* 171* 184*  --   --    AST  --   --   --  9 10 15 11  --   --    ALT  --   --   --  21 26 30 30  --   --      CBC:   Recent Labs   Lab 01/31/21 0441 01/30/21 0417 01/29/21 0416 01/28/21 0412   WBC 11.8* 11.5* 9.7 9.3   RBC 3.77* 3.62* 3.21* 3.39*   HGB 11.3* 10.7* 9.5* 10.3*   HCT 34.6* 32.2* 28.3* 31.2*   MCV 92 89 88 92   MCH 30.0 29.6 29.6 30.4   MCHC 32.7 33.2 33.6 33.0   RDW 14.9 14.9 14.5 14.7    198 176 166       INR: No lab results found in last 7 days.    Glucose:   Recent Labs   Lab 01/31/21  0441 01/31/21  0432 01/31/21  0025 01/30/21  2025 01/30/21  1535 01/30/21  1130 01/30/21  0810 01/30/21  0417 01/29/21  0416 01/29/21  0416 01/28/21  0412 01/28/21  0412 01/27/21  0414 01/27/21  0414 01/26/21  0425 01/26/21  0425   *  --   --   --   --   --   --  152*  --  164*  --  182*  --  172*  --  276*   BGM  --  256* 245* 161* 144* 139* 134*  --    < >  --    < >  --    < >  --    < >  --     < > = values in this interval not displayed.       Blood Gas:   Recent Labs   Lab 01/31/21  0441 01/29/21  1547 01/29/21  0416 01/28/21  0404    PHV 7.43  --  7.54* 7.36   PCO2V 41  --  35* 44   PO2V 50*  --  37 44   HCO3V 27  --  30* 25   LASHAUN 2.6  --  6.8  --    O2PER 70.0 100 50.0 50.0       Culture Data   Recent Labs   Lab 01/25/21  1347 01/24/21  1841 01/24/21  1830 01/24/21  1729 01/24/21  1652 01/24/21  1629   CULT No growth  Culture negative after 4 days  Culture received and in progress.  Positive AFB results are called as soon as detected.    Final report to follow in 7 to 8 weeks.    Assayed at [facility] [address], [place] [address] [phone]  Culture negative monitoring continues No growth No growth after 5 days No growth No growth No growth after 5 days  Culture negative after 4 days  Culture received and in progress.  Positive AFB results are called as soon as detected.    Final report to follow in 7 to 8 weeks.    Assayed at [facility] [address] [place] [address] [phone]  No growth  Culture negative monitoring continues       Virology Data:   Lab Results   Component Value Date    FLUAH1 Not Detected 01/24/2021    FLUAH3 Not Detected 01/24/2021    PA6576 Not Detected 01/24/2021    IFLUB Not Detected 01/24/2021    RSVA Not Detected 01/24/2021    RSVB Not Detected 01/24/2021    PIV1 Not Detected 01/24/2021    PIV2 Not Detected 01/24/2021    PIV3 Not Detected 01/24/2021    HMPV Not Detected 01/24/2021    HRVS Negative 01/24/2021    ADVBE Negative 01/24/2021    ADVC Negative 01/24/2021    ADVC Negative 12/23/2020    ADVC Negative 10/07/2019       Historical CMV results (last 3 of prior testing):  Lab Results   Component Value Date    CMVQNT <137 (A) 01/25/2021    CMVQNT 238 (A) 01/24/2021    CMVQNT 675 (A) 12/23/2020     Lab Results   Component Value Date    CMVLOG <2.1 01/25/2021    CMVLOG 2.4 (H) 01/24/2021    CMVLOG 2.8 (H) 12/23/2020       Urine Studies    Recent Labs   Lab Test 01/24/21  1729 10/21/19  2240   URINEPH 5.0 5.0   NITRITE Negative Negative   LEUKEST Moderate* Large*   WBCU 34*  115*       Most Recent [place] Pulmonary Function Testing (FVC/FEV1 only)  FVC-Pre   Date Value Ref Range Status   12/09/2020 1.12 L    09/09/2020 1.56 L    03/09/2020 1.57 L    02/19/2020 1.78 L      FVC-%Pred-Pre   Date Value Ref Range Status   12/09/2020 34 %    09/09/2020 47 %    03/09/2020 48 %    02/19/2020 54 %      FEV1-Pre   Date Value Ref Range Status   12/09/2020 0.98 L    09/09/2020 1.10 L    03/09/2020 0.96 L    02/19/2020 0.99 L      FEV1-%Pred-Pre   Date Value Ref Range Status   12/09/2020 37 %    09/09/2020 42 %    03/09/2020 37 %    02/19/2020 38 %        IMAGING    Recent Results (from the past 48 hour(s))   XR Feeding Tube Placement    Narrative    Feeding tube placement.    Comparison: Abdominal x-ray dated 1/26/2021.    History: Feeding tube needed for nutrition. Unsuccessful attempt to  place feeding tube at bedside using Enteral access system (Cortrak)    Fluoroscopy time:  13.3 minute[s]    Technique: After injection of lidocaine gel into the right nostril,  attempted to advance previously placed cortrak feeding tube under  fluoroscopic guidance. Despite numerous attempts with multiple  guidewires, this was unsuccessful. Cortrak feeding tube was removed  and a new feeding tube was placed and advanced under fluoroscopic  guidance.    Findings: The feeding tube is advanced as far as possible. Despite  numerous attempts, the tip was unable to be advanced beyond the  pyloric sphincter. Feeding tube positioned with tip near the pylorus,  likely abutting the pyloric sphincter. A small amount of barium,  water, and air was injected to define anatomy. Guidewire was removed  and placed with plastic bag attached to patient whiteboard. Feeding  tube secured with nasal bridle and flushed with water.      Impression    Impression: Feeding tube placement with tip advanced as far as  possible; positioned with tip abutting the pyloric sphincter within  the gastric lumen. Due to upper limits of recommended  fluoroscopy  time, further attempts at placement deferred until following day.  Feeding tube was secured in place with additional length of tube  within the gastric lumen in anticipation of duodenal advancement  secondary to peristalsis. May consider obtaining follow-up abdominal  x-ray in a.m. to reevaluate position.    I have personally reviewed the examination and initial interpretation  and I agree with the findings.    ANA CRISOSTOMO, DO   XR Feeding Tube Reposition    Narrative    Fluoroscopic guided feeding tube placement    INDICATION: NG tube advanced with fluoroscopy, could not get tube past  the pylorus sphincter on 1/27    COMPARISON: 1/27/2021    FINDINGS: 40.4 seconds fluoroscopy time were utilized. Feeding tube  was in the stomach but postpyloric placement was requested. Wires  manipulated and the feeding tube advanced into the distal duodenum. No  acute complication. Tube was secured by nasal bridle.    Dr. Issa, faculty, was also present in the room during the  entire examination.      Impression    IMPRESSION: Successful feeding tube postpyloric placement into the  distal most duodenum.    TERRI ISSA MD

## 2021-01-31 NOTE — PLAN OF CARE
Assumed care [phone]    ICU End of Shift Summary. See flowsheets for vital signs and detailed assessment.    Changes this shift: Stand-pivot transfer back to bed with assist x 2 for support and lines. Breathing comfortably on high flow nasal cannula 60lpm/% FiO2. Declined bipap break until sleep tonight. Productive cough, RT called to bedside for additional chest physiotherapy per patient request to help expectorate more secretions.    Plan:  Alternate high flow with bipap for patient comfort and to maintain SpO2 > 88%. Notify MICU team of any changes.     Problem: Adult Inpatient Plan of Care  Goal: Readiness for Transition of Care  Outcome: No Change     Problem: ARDS (Acute Respiratory Distress Syndrome)  Goal: Effective Oxygenation  Outcome: Improving     Problem: Hypertension Comorbidity  Goal: Blood Pressure in Desired Range  Outcome: Improving  Intervention: Maintain Blood Pressure Management  Recent Flowsheet Documentation  Taken 1/31/2021 1600 by Antoinette Johnson, RN  Medication Review/Management:    medications reviewed    high-risk medications identified

## 2021-01-31 NOTE — PLAN OF CARE
SLP: Swallow evaluation orders received. Pt remains on BIPAP. Will HOLD, monitor via chart review and evaluate when medically appropriate.

## 2021-01-31 NOTE — PLAN OF CARE
"ICU End of Shift Summary. See flowsheets for vital signs and detailed assessment.    Changes this shift:  Pt on BIPAP, titrated FIO2 down to 60, sats 90%.  [id number]. No c/o SOB.  HR 90s-140s with runs of Afib up to 180s, not sustained, MD aware. At 1130 pupils were uneven, R pupil found to be 5mm and unreactive.  Head CT done, no findings.  Neuro exam otherwise unchanged.  Pt calls it her \"bad eye\", she goes to [facility] in [place], [place] monthly for injections due to an injury from a previous hospitalization.  Her vision is baseline worse in the R eye than the L eye.  HD done, 3 L off, had increased ectopy and A fib during run, but BP remained stable.  Small bore feeding tube bridled and started nutrition.  Will advance per MD schedule. One large loose BM, continent.     Plan:  Titrate down FIO2 as able. Up to chair tomorrow and increase activity.  Metoprolol scheduled for HR. Advance TF q4hrs as tolerated.  Continue to monitor pupils.  "

## 2021-01-31 NOTE — PROGRESS NOTES
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 She was transferred to OSH ICU 1/22 and intubated and requiring vasopressors. 1/24 transferred to [facility] Medical ICU service for ongoing management. Outside chest CT showed bilateral infiltrates. Antibiotic treatment with vanc, zosyn, and bactrim (steno history). She was bronched with no specific growth on BAL ( with neutrophil predominance) however BAL PJP PCR was positive (returned 1/28), fungitell was then checked afterwards and found to be elevated ~250. Bactrim was continued and steroids were added (prednisone 40 mg BID). Left diagnostic thoracentesis was done from left empyema which returned exudative with inflammatory cells but no specific growth. Blood and urine cultures have remained negative. IgE was not elevated.  She was extubated on 1/26/2021, however, patient re-intubated on same day due to worsening O2 requirement. CXR with worsened consolidations and concern for possible edema. BNP was 28,000. 2D echo showed preserved EF. CT chest was concerning for alveolar and interstitial opacities. Ventilator liberation was likely unsuccessful due to fluid overload/pulmonary edema in the setting of underlying PJP PNA. Had HD session on 1/28 with removal of 3 L of fluid and was extubated yesterday. Had slight increased WOB overnight and was placed on BIPAP. Another HD session on 1/30/21 with removal of ~3 L of fluid. Respiratory status continues to be tenuous on BIPAP in the last 24 hours. FiO2 weaned down to 55% on 14/8 cmH2O.  She appears comfortable. Will attempt to overlap with HFNC today. Afib with RVR noted and started on metoprolol. Remains full code.      The patient was seen and examined with the resident/fellow physician.  We have discussed the patient in detail and I agree with the findings, assessment, and plan as documented when this note was cosigned on this day. The plan was formulated in conjunction with pharmacy, ICU nurses, and respiratory therapist. I have evaluated all laboratory values and imaging studies for the past 24 hours. I have reviewed all the consults that have been ordered and are active for this patient.      Critical Care Time: 30 min.  I spent this time (excluding procedures) personally providing and directing critical care services at the bedside and on the critical care unit.      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 She was transferred to OSH ICU 1/22 and intubated and requiring vasopressors. 1/24 transferred to [facility] Medical ICU service for ongoing management, extubated on 1/29 to BiPAP.      CHANGES and MAJOR THINGS TODAY:   - Continue BiPAP weaning FiO2 as tolerated  - Breaks with HFNC as tolerated  - Stop Mucomyst nebs  - Add 10 units of glargine  - Increase metoprolol to 25mg BID  - Replace K+  - Reduce TF rate back to 20ml/hr   - Place IR consult to place a post-pyloric feeding tube    PLAN:     Neuro:  #Insomnia  - Melatonin     #Unequal pupils  Neuro  exam unremarkable otherwise. Patient reports history of issues requiring monthly injections in her eyes.  - Head CT without intracranial abnormality     Pulmonary:  #Acute Hypoxic Respiratory Failure, improving  Suspected 2/2 PJP. OSH CT 1/23 + bilateral ground glass opacities and consolidative lung infiltrates centrally distributed to the upper lobes and RLL, . Covid-19 negative X3 at OSH.   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  - Positive PJP PCR on 1/28 with positive fungitel   - Continue PTA tacrolimus  - Prednisone taper, holding home dose for now  - Pulmonary lung transplant team consulted and following, appreciate recs     Cardiovascular:  #Shock, resolved  #Hx Hypertension  Patient was transferred on Levophed. Right heart catheterization 03/10/2020 concluded right sided filling pressures mildly elevated, mild PHTN, mild right sided filling pressures, and normal cardiac output. Last ECHO 10/21/20 with EF 60-65%, normal LV & RV function.   - Patient weaned off pressors 1/26 currently hemodynamically stable off pressors  - Hold PTA coreg and amlodipine     # Atrial fibrillation w/ RVR  Patient with pAF with rates into 130-40s. Likely in the setting of acute pulmoary illness  - Metoprolol 12.5 BID; Increased to 25mg BID 1/31  - Consider diltiazem drip if unable to control with PO metoprolol     GI/Nutrition:  # Portal HTN  Liver biopsy positive for congestive hepatopathy. Previous had ascites thought to be r/t elevated right sided heart pressures. Last saw GI on 12/21/20 and patient endorsed that her ascites is no longer  present.   - NTD     # Nutrition  - NJT placed after multiple attempts and was post-pyloric but subsequently retracted into the stomach; TF rate increased to 40ml/hr carefully yesterday. TF rate reduced back to 20ml/hr today to reduce risk for aspiration  - IR consult today for post-pyloric placement given multiple failed bedside attempts    # Constipation  Patient reportedly has history of constipation with bactrim use, now stooling  - Scheduled miralax ordered  - PRN dulcolax and senna-doc ordered     Renal/Fluids/Electrolytes:  # ESRD on iHD twice weekly (M/Th).   # Anion gap metabolic acidosis r/t ESRD, uremia- Resolved  Outpatient dry body weight 56.5 kg. Ran dialysis 1/26, 1/28, 1/30  - Nephrology Consulted   - Continued dialysis per neph recs  - I&O  - Daily Weights   - Trend BMP      Endocrine:  # Seronegative RA with Raynaud's   - NTD, monitor   # Hyperglycemia, steroid induced  - High intensity sliding scale insulin  - 10 units glargine daily     ID:  # Sepsis   Suspected related to bacterial pneumonia. OSH CT 1/23 + bilateral ground glass opacities and consolidative lung infiltrates centrally distributed to the upper lobes and RLL. Covid-19 negative X3 at OSH. Procalcitonin negative. Urine strep, CMV, and RPP negative.  - PJP PCR positive, fungitell positive as well  - Continue treatment with bactrim, prednisone taper  - Bronch completed, KOH and Gram not revealing,  with neutrophil predominance, other labs pending  - s/p thora 1/25, fluid exudative, NGTD  - F/U pending micro w/u   - Continue empiric antibiotic treatment with ceftriaxone, plan for 7 day course     # Chronic/Stable right aspergillus empyema   - Continue PTA posaconazole, will monitor QTc     Hematology:    # Anemia r/t renal disease, chronic stable   # Thrombocytopenia r/t critical illness, resolved   # Leukocytosis, infection/steroids  Takes aranesp 25 mcg every four weeks, last dose 01/14.    - Daily  CBC     Musculoskeletal:  #Weakness/Deconditioning of critical illness  - PT/OT when appropriate      Skin:  # Dermatomyositis antitryptase syndrome   - Noted, NTD     General Cares/Prophylaxis:    DVT Prophylaxis: Heparin SQ  GI Prophylaxis: PPI  Restraints: Not indicated  Family Communication:  updated at bedside  Code Status: Full      Lines/tubes/drains:  - HD fistula left forearm  - CVC RI     Disposition:  - Medical ICU    Patient seen and findings/plan discussed with medical ICU staff, Dr. Jean.    Cyndi Sanon NP-C    ====================================  INTERVAL HISTORY:   Patient had limiting runs of atrial fibrillation. No other acute events    OBJECTIVE:   1. VITAL SIGNS:   Temp:  [97.8  F (36.6  C)-99.2  F (37.3  C)] 99.2  F (37.3  C)  Pulse:  [] 117  Resp:  [22-30] 23  BP: ()/(59-85) 116/82  FiO2 (%):  [60 %-85 %] 70 %  SpO2:  [88 %-94 %] 90 %  FiO2 (%): 70 %  Resp: 23    2. INTAKE/ OUTPUT:   I/O last 3 completed shifts:  In: 970 [I.V.:425; NG/GT:330]  Out: 3000 [Other:3000]    3. PHYSICAL EXAMINATION:  General: Laying in bed. No acute distress. On BiPAP, sleeping  HEENT: PERRLA. Following commands. Responding appropriately. No focal deficits.  Neuro: A&Ox3, non focal. Reflexes intact. Right pupil dilated without constriction to light (left constricts when light shined in right) this is baseline for her  Pulm/Resp: Clear/diminished bilaterally  CV: RRR  Abdomen: Soft, non-distended, non-tender  Incisions/Skin: Left AV fistula with thrill and bruit    4. LABS:   Arterial Blood Gases   Recent Labs   Lab 01/29/21  1547 01/27/21  0540 01/27/21  0316 01/27/21  0147   PH 7.46* 7.39 7.41 7.39   PCO2 41 41 38 40   PO2 56* 79* 59* 45*   HCO3 29* 25 24 24     Complete Blood Count   Recent Labs   Lab 01/31/21  0441 01/30/21  0417 01/29/21  0416 01/28/21  0412   WBC 11.8* 11.5* 9.7 9.3   HGB 11.3* 10.7* 9.5* 10.3*    198 176 166     Basic Metabolic Panel  Recent Labs   Lab  01/31/21  0441 01/30/21  0417 01/29/21  2321 01/29/21 0416 01/28/21  0412    134  --  137 136   POTASSIUM 3.1* 3.5 3.3* 3.4 3.7   CHLORIDE 97 99  --  100 103   CO2 25 24  --  26 22   BUN 37* 44*  --  28 53*   CR 3.02* 3.72*  --  2.76* 3.77*   * 152*  --  164* 182*     Liver Function Tests  Recent Labs   Lab 01/29/21  0416 01/28/21  0412 01/27/21  0414 01/26/21  0425   AST 9 10 15 11   ALT 21 26 30 30   ALKPHOS 135 164* 171* 184*   BILITOTAL 0.5 0.6 0.8 0.8   ALBUMIN 1.9* 1.8* 2.2* 2.0*     Coagulation Profile  No lab results found in last 7 days.    5. RADIOLOGY:   Recent Results (from the past 24 hour(s))   CT Head w/o Contrast    Narrative    CT HEAD W/O CONTRAST 1/30/2021 2:43 PM    Provided History: Neuro deficit, acute, stroke suspected; fixed  dilated R pupil  ICD-10:    Comparison: None.    Technique: Using multidetector thin collimation helical acquisition  technique, axial, coronal and sagittal CT images from the skull base  to the vertex were obtained without intravenous contrast.     Findings:    No intracranial hemorrhage, mass effect, or midline shift. The  ventricles are proportionate to the cerebral sulci. The gray to white  matter differentiation of the cerebral hemispheres is preserved. The  basal cisterns are patent. Mild periventricular white matter  hypodensity consistent with chronic small vessel ischemic disease.    Mild high right frontoparietal dural calcification/ossification. The  visualized paranasal sinuses are clear. Mild dependent bilateral  mastoid effusions.       Impression    Impression: No acute intracranial pathology.    I have personally reviewed the examination and initial interpretation  and I agree with the findings.    NEAL LOPES MD

## 2021-01-31 NOTE — PROGRESS NOTES
ICU End of Shift Summary. See flowsheets for vital signs and detailed assessment.    Changes this shift:  Fio2 65-70% on bipap.  Pupils unequal L>R but improving and both reactive.  HR 's Afib w/ RVR.  K 3.1 this am MD aware.  TF at goal 40ml/hr.  BMx1 .  's on SSI    Plan: MD to advise on K replacement and BS control.  Wean Fio2 as tolerated. Increase activity as pt tolerates.

## 2021-02-01 ENCOUNTER — APPOINTMENT (OUTPATIENT)
Dept: GENERAL RADIOLOGY | Facility: CLINIC | Age: 61
End: 2021-02-01
Attending: NURSE PRACTITIONER
Payer: COMMERCIAL

## 2021-02-01 ENCOUNTER — APPOINTMENT (OUTPATIENT)
Dept: PHYSICAL THERAPY | Facility: CLINIC | Age: 61
End: 2021-02-01
Attending: INTERNAL MEDICINE
Payer: COMMERCIAL

## 2021-02-01 LAB
ANION GAP SERPL CALCULATED.3IONS-SCNC: 7 MMOL/L (ref 3–14)
BACTERIA SPEC CULT: NORMAL
BUN SERPL-MCNC: 55 MG/DL (ref 7–30)
CALCIUM SERPL-MCNC: 9 MG/DL (ref 8.5–10.1)
CHLORIDE SERPL-SCNC: 99 MMOL/L (ref 94–109)
CO2 SERPL-SCNC: 28 MMOL/L (ref 20–32)
CREAT SERPL-MCNC: 3.76 MG/DL (ref 0.52–1.04)
ERYTHROCYTE [DISTWIDTH] IN BLOOD BY AUTOMATED COUNT: 14.9 % (ref 10–15)
GFR SERPL CREATININE-BSD FRML MDRD: 13 ML/MIN/{1.73_M2}
GLUCOSE BLDC GLUCOMTR-MCNC: 144 MG/DL (ref 70–99)
GLUCOSE BLDC GLUCOMTR-MCNC: 148 MG/DL (ref 70–99)
GLUCOSE BLDC GLUCOMTR-MCNC: 149 MG/DL (ref 70–99)
GLUCOSE BLDC GLUCOMTR-MCNC: 159 MG/DL (ref 70–99)
GLUCOSE BLDC GLUCOMTR-MCNC: 169 MG/DL (ref 70–99)
GLUCOSE BLDC GLUCOMTR-MCNC: 177 MG/DL (ref 70–99)
GLUCOSE BLDC GLUCOMTR-MCNC: 194 MG/DL (ref 70–99)
GLUCOSE SERPL-MCNC: 184 MG/DL (ref 70–99)
HCT VFR BLD AUTO: 31.8 % (ref 35–47)
HGB BLD-MCNC: 10.5 G/DL (ref 11.7–15.7)
IGG SERPL-MCNC: 763 MG/DL (ref 610–1616)
Lab: NORMAL
MCH RBC QN AUTO: 29.8 PG (ref 26.5–33)
MCHC RBC AUTO-ENTMCNC: 33 G/DL (ref 31.5–36.5)
MCV RBC AUTO: 90 FL (ref 78–100)
PLATELET # BLD AUTO: 275 10E9/L (ref 150–450)
POTASSIUM SERPL-SCNC: 3.3 MMOL/L (ref 3.4–5.3)
RBC # BLD AUTO: 3.52 10E12/L (ref 3.8–5.2)
SODIUM SERPL-SCNC: 134 MMOL/L (ref 133–144)
SPECIMEN SOURCE: NORMAL
WBC # BLD AUTO: 10.5 10E9/L (ref 4–11)

## 2021-02-01 PROCEDURE — 97530 THERAPEUTIC ACTIVITIES: CPT | Mod: GP

## 2021-02-01 PROCEDURE — 94668 MNPJ CHEST WALL SBSQ: CPT

## 2021-02-01 PROCEDURE — 71045 X-RAY EXAM CHEST 1 VIEW: CPT | Mod: 26 | Performed by: RADIOLOGY

## 2021-02-01 PROCEDURE — 999N001017 HC STATISTIC GLUCOSE BY METER IP

## 2021-02-01 PROCEDURE — 250N000013 HC RX MED GY IP 250 OP 250 PS 637: Performed by: NURSE PRACTITIONER

## 2021-02-01 PROCEDURE — 80048 BASIC METABOLIC PNL TOTAL CA: CPT | Performed by: STUDENT IN AN ORGANIZED HEALTH CARE EDUCATION/TRAINING PROGRAM

## 2021-02-01 PROCEDURE — 99222 1ST HOSP IP/OBS MODERATE 55: CPT | Performed by: OPTOMETRIST

## 2021-02-01 PROCEDURE — 250N000012 HC RX MED GY IP 250 OP 636 PS 637: Performed by: NURSE PRACTITIONER

## 2021-02-01 PROCEDURE — 94660 CPAP INITIATION&MGMT: CPT

## 2021-02-01 PROCEDURE — 272N000079 HC NUTRITION PRODUCT RENAL BASIC LITER

## 2021-02-01 PROCEDURE — 250N000013 HC RX MED GY IP 250 OP 250 PS 637: Performed by: INTERNAL MEDICINE

## 2021-02-01 PROCEDURE — 999N000157 HC STATISTIC RCP TIME EA 10 MIN

## 2021-02-01 PROCEDURE — 71045 X-RAY EXAM CHEST 1 VIEW: CPT

## 2021-02-01 PROCEDURE — 258N000003 HC RX IP 258 OP 636: Performed by: NURSE PRACTITIONER

## 2021-02-01 PROCEDURE — 90937 HEMODIALYSIS REPEATED EVAL: CPT

## 2021-02-01 PROCEDURE — 250N000013 HC RX MED GY IP 250 OP 250 PS 637: Performed by: STUDENT IN AN ORGANIZED HEALTH CARE EDUCATION/TRAINING PROGRAM

## 2021-02-01 PROCEDURE — 97110 THERAPEUTIC EXERCISES: CPT | Mod: GP

## 2021-02-01 PROCEDURE — 94640 AIRWAY INHALATION TREATMENT: CPT

## 2021-02-01 PROCEDURE — 99291 CRITICAL CARE FIRST HOUR: CPT | Mod: GC | Performed by: INTERNAL MEDICINE

## 2021-02-01 PROCEDURE — 250N000009 HC RX 250: Performed by: NURSE PRACTITIONER

## 2021-02-01 PROCEDURE — 250N000012 HC RX MED GY IP 250 OP 636 PS 637: Performed by: INTERNAL MEDICINE

## 2021-02-01 PROCEDURE — 200N000002 HC R&B ICU UMMC

## 2021-02-01 PROCEDURE — 99233 SBSQ HOSP IP/OBS HIGH 50: CPT | Performed by: NURSE PRACTITIONER

## 2021-02-01 PROCEDURE — 94640 AIRWAY INHALATION TREATMENT: CPT | Mod: 76

## 2021-02-01 PROCEDURE — 250N000011 HC RX IP 250 OP 636: Performed by: STUDENT IN AN ORGANIZED HEALTH CARE EDUCATION/TRAINING PROGRAM

## 2021-02-01 PROCEDURE — 250N000011 HC RX IP 250 OP 636: Performed by: NURSE PRACTITIONER

## 2021-02-01 PROCEDURE — 250N000009 HC RX 250: Performed by: INTERNAL MEDICINE

## 2021-02-01 PROCEDURE — 85027 COMPLETE CBC AUTOMATED: CPT | Performed by: STUDENT IN AN ORGANIZED HEALTH CARE EDUCATION/TRAINING PROGRAM

## 2021-02-01 PROCEDURE — 258N000003 HC RX IP 258 OP 636: Performed by: INTERNAL MEDICINE

## 2021-02-01 RX ORDER — ALBUMIN (HUMAN) 12.5 G/50ML
SOLUTION INTRAVENOUS
Status: DISCONTINUED
Start: 2021-02-01 | End: 2021-02-01 | Stop reason: HOSPADM

## 2021-02-01 RX ORDER — NYSTATIN 100000/ML
1000000 SUSPENSION, ORAL (FINAL DOSE FORM) ORAL 4 TIMES DAILY
Status: DISCONTINUED | OUTPATIENT
Start: 2021-02-01 | End: 2021-02-16

## 2021-02-01 RX ORDER — SODIUM CHLORIDE 9 MG/ML
INJECTION, SOLUTION INTRAVENOUS CONTINUOUS
Status: DISCONTINUED | OUTPATIENT
Start: 2021-02-01 | End: 2021-02-25 | Stop reason: HOSPADM

## 2021-02-01 RX ORDER — NYSTATIN 100000/ML
500000 SUSPENSION, ORAL (FINAL DOSE FORM) ORAL 4 TIMES DAILY
Status: DISCONTINUED | OUTPATIENT
Start: 2021-02-01 | End: 2021-02-01

## 2021-02-01 RX ORDER — POTASSIUM CHLORIDE 29.8 MG/ML
20 INJECTION INTRAVENOUS ONCE
Status: COMPLETED | OUTPATIENT
Start: 2021-02-01 | End: 2021-02-01

## 2021-02-01 RX ADMIN — INSULIN ASPART 2 UNITS: 100 INJECTION, SOLUTION INTRAVENOUS; SUBCUTANEOUS at 04:14

## 2021-02-01 RX ADMIN — INSULIN ASPART 1 UNITS: 100 INJECTION, SOLUTION INTRAVENOUS; SUBCUTANEOUS at 11:43

## 2021-02-01 RX ADMIN — HEPARIN SODIUM 5000 UNITS: 5000 INJECTION, SOLUTION INTRAVENOUS; SUBCUTANEOUS at 00:52

## 2021-02-01 RX ADMIN — SULFAMETHOXAZOLE AND TRIMETHOPRIM 295 MG: 200; 40 SUSPENSION ORAL at 08:28

## 2021-02-01 RX ADMIN — IPRATROPIUM BROMIDE AND ALBUTEROL SULFATE 3 ML: .5; 3 SOLUTION RESPIRATORY (INHALATION) at 12:59

## 2021-02-01 RX ADMIN — IPRATROPIUM BROMIDE AND ALBUTEROL SULFATE 3 ML: .5; 3 SOLUTION RESPIRATORY (INHALATION) at 07:28

## 2021-02-01 RX ADMIN — SODIUM CHLORIDE 250 ML: 9 INJECTION, SOLUTION INTRAVENOUS at 11:33

## 2021-02-01 RX ADMIN — SODIUM CHLORIDE 300 MG: 9 INJECTION, SOLUTION INTRAVENOUS at 09:44

## 2021-02-01 RX ADMIN — NYSTATIN 1000000 UNITS: 500000 SUSPENSION ORAL at 20:15

## 2021-02-01 RX ADMIN — NYSTATIN 1000000 UNITS: 500000 SUSPENSION ORAL at 11:50

## 2021-02-01 RX ADMIN — INSULIN ASPART 1 UNITS: 100 INJECTION, SOLUTION INTRAVENOUS; SUBCUTANEOUS at 19:59

## 2021-02-01 RX ADMIN — TACROLIMUS 0.5 MG: 5 CAPSULE ORAL at 17:56

## 2021-02-01 RX ADMIN — BUDESONIDE 0.5 MG: 0.5 INHALANT ORAL at 21:41

## 2021-02-01 RX ADMIN — INSULIN ASPART 3 UNITS: 100 INJECTION, SOLUTION INTRAVENOUS; SUBCUTANEOUS at 00:51

## 2021-02-01 RX ADMIN — INSULIN ASPART 2 UNITS: 100 INJECTION, SOLUTION INTRAVENOUS; SUBCUTANEOUS at 16:07

## 2021-02-01 RX ADMIN — IPRATROPIUM BROMIDE AND ALBUTEROL SULFATE 3 ML: .5; 3 SOLUTION RESPIRATORY (INHALATION) at 16:44

## 2021-02-01 RX ADMIN — Medication: at 11:33

## 2021-02-01 RX ADMIN — SULFAMETHOXAZOLE AND TRIMETHOPRIM 295 MG: 200; 40 SUSPENSION ORAL at 20:03

## 2021-02-01 RX ADMIN — VALGANCICLOVIR HYDROCHLORIDE 450 MG: 50 POWDER, FOR SOLUTION ORAL at 20:04

## 2021-02-01 RX ADMIN — SODIUM CHLORIDE 300 ML: 9 INJECTION, SOLUTION INTRAVENOUS at 11:33

## 2021-02-01 RX ADMIN — NYSTATIN 1000000 UNITS: 500000 SUSPENSION ORAL at 16:16

## 2021-02-01 RX ADMIN — INSULIN ASPART 1 UNITS: 100 INJECTION, SOLUTION INTRAVENOUS; SUBCUTANEOUS at 23:08

## 2021-02-01 RX ADMIN — Medication 5 ML: at 08:06

## 2021-02-01 RX ADMIN — INSULIN GLARGINE 10 UNITS: 100 INJECTION, SOLUTION SUBCUTANEOUS at 08:33

## 2021-02-01 RX ADMIN — PREDNISONE 40 MG: 20 TABLET ORAL at 20:03

## 2021-02-01 RX ADMIN — LIDOCAINE HYDROCHLORIDE 0.5 ML: 10 INJECTION, SOLUTION EPIDURAL; INFILTRATION; INTRACAUDAL; PERINEURAL at 11:33

## 2021-02-01 RX ADMIN — METOPROLOL TARTRATE 25 MG: 25 TABLET, FILM COATED ORAL at 08:27

## 2021-02-01 RX ADMIN — INSULIN ASPART 1 UNITS: 100 INJECTION, SOLUTION INTRAVENOUS; SUBCUTANEOUS at 08:11

## 2021-02-01 RX ADMIN — BUDESONIDE 0.5 MG: 0.5 INHALANT ORAL at 07:28

## 2021-02-01 RX ADMIN — Medication 37.5 MG: at 20:03

## 2021-02-01 RX ADMIN — TACROLIMUS 1 MG: 5 CAPSULE ORAL at 08:28

## 2021-02-01 RX ADMIN — POTASSIUM CHLORIDE 20 MEQ: 29.8 INJECTION, SOLUTION INTRAVENOUS at 08:30

## 2021-02-01 RX ADMIN — HEPARIN SODIUM 5000 UNITS: 5000 INJECTION, SOLUTION INTRAVENOUS; SUBCUTANEOUS at 16:15

## 2021-02-01 RX ADMIN — HEPARIN SODIUM 5000 UNITS: 5000 INJECTION, SOLUTION INTRAVENOUS; SUBCUTANEOUS at 23:07

## 2021-02-01 RX ADMIN — PREDNISONE 40 MG: 20 TABLET ORAL at 08:07

## 2021-02-01 RX ADMIN — IPRATROPIUM BROMIDE AND ALBUTEROL SULFATE 3 ML: .5; 3 SOLUTION RESPIRATORY (INHALATION) at 21:41

## 2021-02-01 RX ADMIN — Medication 40 MG: at 08:28

## 2021-02-01 RX ADMIN — HEPARIN SODIUM 5000 UNITS: 5000 INJECTION, SOLUTION INTRAVENOUS; SUBCUTANEOUS at 08:07

## 2021-02-01 ASSESSMENT — ACTIVITIES OF DAILY LIVING (ADL)
ADLS_ACUITY_SCORE: 13
ADLS_ACUITY_SCORE: 15
ADLS_ACUITY_SCORE: 13

## 2021-02-01 ASSESSMENT — MIFFLIN-ST. JEOR
SCORE: 1149
SCORE: 1114

## 2021-02-01 NOTE — PROGRESS NOTES
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  Patient was admitted to OSH on  for acute hypoxemic respiratory failure and new lung opacities. Intubated  and transferred to [facility] for ongoing management.  Extubated  but reintubated  for progressive hypoxemia and increased infiltrates on imaging. Tolerated PST  AM and extubated to HFNC 60%FiO2, however, therapy escalated to BIPAP currently, improving slowly from 85% to 60-70% FiO2. Atrial fibrillation , started on metoprolol.      Recommendations:  - Recommend reattempt at [place] with radiology--high aspiration risk with BiPAP  - Continue Bactrim for PJP and Steno coverage. Agree with discontinuing ceftriaxone as Eikenella has not grown on cultures this admission  - Steroid burst and taper per MICU, resume chronic prednisone upon burst/taper completion  - Tacrolimus level on Wed (2/3), ordered  - Please let our team know if planning to start diltiazem given known interaction with tacrolimus  - Consider IP consult for evaluation of right bronchial stenosis     Acute hypoxemic respiratory failure:   Right post-obstructive Stenotrophomonas and Eikenella pneumonia:  PJP pneumonia:  Septic shock: Presented to OSH ED with increased SOB and hypoxia, initially requiring 4L NC but continued to decline and subsequently intubated . Hemodynamic instability after intubation requiring pressors, transferred to [facility]. Afebrile but with  leukocytosis. COVID and respiratory panel negative. PTA bronch (12/23/20) with moderate airway obstruction and RML/RLL mucous plugging, cultures with Stenotrophomonas and Eikenella (IV ceftazidime 1/13-1/19). OSH CT chest with new multifocal GGO bilaterally and increased left loculated pleural effusion. Repeat bronch (1/24) with RUL BAL with thick copious secretions to RML/RLL. + PJP PCR from BAL (1/24). S/p thoracentsis (1/25)), cultures NGTD. BD glucan slightly elevated at 261 (from 106), but within her recent range. Extubated to 4L on 1/26. Reintubated 1/27-1/29 for progressive hypoxemia,  HFNC 60%FiO2/60LPM, had increased O2 requirements, so placed on BiPAP later (1/29), with slight improvement in O2 requirements, down to 60-75% FiO2. Feeling comfortable currently on BiPAP.   - Blood cultures and fungal blood cultures (1/24) NGTD  - BAL cultures (1/24) with PJP PCR and BDG fungitell positive at 261 (1/28/21) and increased from prior 106 (9/9/20)  - ABX per MICU: Continue Bactrim for PJP and Steno coverage. Agree with discontinuing ceftriaxone as Eikenella has not grown on cultures this admission  - Stress dose steroids and taper per MICU  - CXR 2/1 for interval follow up: with slightly improved extensive bilateral mixed interstitial and   airspace opacities with lower lung predominance (personally reviewed)   - Pulmonary toilet with chest physiotherapy QID and nebs: Duoneb QID, Mucomyst QID and Pulmicort BID  - BiPAP as needed for dyspnea, wean to HFNC and wean FiO2 as able     Aspergillus empyema (left-sided): First noted 10/8/19. CT scan on 7/17/20 with increased mass-like density, likely pleural-based, in LLL area s/p needle aspiration (8/13/20) with Aspergillus fumigatus on cultures s/p intrapleural amphotericin bead placement (11/20).  Following with ID as OP.  LFTs stable on admission (ALP chronically mildly elevated). CT chest with increased loculated left pleural effusion s/p thoracentesis (1/25),  exudative with 87% neutrophils, very high LDL (6308), cytology normal. Cultures NGTD.  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 DSA (1/25) not detected. Continued right mainstem stenosis noted with PTA bronch on 12/23/20, mild on 1/24 bronch.  - Monitor for worsening hypoxia.  - Consider IP consult for evaluation of stenosis     Immunosuppression: On 2 drug IST d/t recurrent infections  - Tacrolimus 1 mg qAM / 0.5 mg qPM.  Goal level 8-10. Recheck level on Wed, 2/3 (ordered)  - Holding chronic prednisone while on stress dose steroids (5mg qAM and 2.5mg qPM)     Prophylaxis:   - Bactrim treatment dose as above. PTA had not been on PJP ppx since 7/28/20 as CD4 > 200.      H/o CMV viremia: CMV D+/R+.  CMV <137 (1/25).  - VGCV for CMV ppx with stress dose steroids (1/26)      EBV viremia: Level 12/9/20 mildly elevated to 10K (log 4) from prior 3K (3.5 log) on 9/9/20, repeat (1/25/21) decreased to 5619 copies (log of 3.8).  - Recheck in a month    Oropharyngeal candidiasis: White tongue plaque noted 2/1.  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 Chronic iHD M/Th via AVF with partial graft.   - Monitoring of tacrolimus as above  - Dialysis management per nephrology (M/Th iHD)    Atrial fibrillation w/ RVR: H/o paroxysmal AF, last 10/2019. Recurrence 1/30 with RVR (-140s). Likely in the setting of acute pulmonary illness.  - Management per MICU. Metoprolol (1/30, increased 1/31 and 2/1)  - Please let our team know if planning to start diltiazem given known interaction with tacrolimus     We appreciate the excellent care provided by the MICU team. Recommendations communicated via this note. Will continue to follow along closely, please do not hesitate to call with any  "questions or concerns.    Patient discussed with Dr. Erika Ibarra, APRN, CNP   Inpatient Nurse Practitioner  Pulmonary CF/Transplant    Subjective & Interval History:     Visited patient following PT session. Currently on HFNC 100% FiO2 and 60 LPM. Tolerated HFNC most of the day yesterday (% FiO2/60-70 LPM), and on BiPAP overnight at 14/11 and 75%FiO2. Reports SOB and DESHPANDE are slowly improving. Productive cough with thick sputum. No chest pain or wheezing. TF at trophic rate currently, pending IR advancement today, then plan to increase rate. Notes some fullness, but no discomfort or nausea. Stools loose x3 yesterday and x2 today.     Review of Systems:     C: No fever, no chills, slight increase in weight since admission  INTEGUMENTARY/SKIN: No rash or obvious new lesions  ENT/MOUTH: No sore throat, no sinus pain, no nasal congestion or drainage  RESP: See interval history  CV: No chest pain, no palpitations, + peripheral edema, no orthopnea  GI: No nausea, no vomiting, no change in stools, no reflux symptoms  : No dysuria  MUSCULOSKELETAL: No myalgias, no arthralgias  ENDOCRINE: Blood sugars persistently 140-200s  NEURO: No headache, no numbness or tingling  PSYCHIATRIC: Mood stable    Physical Exam:     Vital signs:  Temp: 98.6  F (37  C) Temp src: Axillary BP: 126/86 Pulse: 106   Resp: (!) 34 SpO2: (!) 88 % O2 Device: High Flow Nasal Cannula (HFNC) Oxygen Delivery: 60 LPM Height: 160 cm (5' 2.99\") Weight: 60 kg (132 lb 4.4 oz)  I/O:     Intake/Output Summary (Last 24 hours) at 2/1/2021 1104  Last data filed at 2/1/2021 0500  Gross per 24 hour   Intake 780 ml   Output --   Net 780 ml     Constitutional: awake, sitting in chair, in no apparent distress.   HEENT: Eyes with pink conjunctivae, anicteric. Oral mucosa moist without lesions. Neck supple without lymphadenopathy.   PULM: Deminished air flow bilaterally to bases L>R. Scattered crackles R>L, no rhonchi, no wheezes. Non-labored " breathing on HFNC.  CV: Normal S1 and S2. irregular. No murmur, gallop, or rub. 1+ BLE peripheral edema.   ABD: NABS, soft, nontender, nondistended.    MSK: Moves all extremities. No apparent muscle wasting.   NEURO: Alert and oriented, conversant.   SKIN: Warm, dry. No rash on limited exam.   PSYCH: Mood stable, calm     Lines, Drains, and Devices:  CVC Double Lumen 10/25/19 Right Internal jugular (Active)   Number of days: 465       Hemodialysis Vascular Access Arteriovenous fistula Left Arm (Active)   Site Assessment WDL 02/01/21 0400   Cannulation Needle Size 15 01/30/21 0920   Dressing Intervention Dressing changed 01/31/21 1800   Dressing Status Clean, dry, intact 02/01/21 0400   Verification by x-ray Not applicable 01/28/21 1520   Hand Off Report Yes 01/30/21 1250   Number of days: 7       CVC TRIPLE LUMEN Right Internal jugular (Active)   Site Assessment [facility] 02/01/21 0400   Dressing Type Chlorhexidine sponge 02/01/21 0400   Dressing Status clean;dry;intact 02/01/21 0400   Dressing Intervention dressing changed 01/31/21 0400   Dressing Change Due 02/07/21 02/01/21 0400   Line Necessity yes, meets criteria 02/01/21 0400   Blue - Status saline locked 02/01/21 0400   Blue - Cap Change Due 02/02/21 02/01/21 0400   Brown - Status infusing 02/01/21 0400   Brown - Cap Change Due 02/02/21 02/01/21 0400   White - Status saline locked 02/01/21 0400   White - Cap Change Due 02/02/21 02/01/21 0400   Phlebitis Scale 0-->no symptoms 02/01/21 0400   Infiltration? no 02/01/21 0400   Infiltration Scale 0 02/01/21 0400   Infiltration Site Treatment Method  None 02/01/21 0400   CVC Comment CDI 02/01/21 0400   Number of days: 8     Data:     LABS    CMP:   Recent Labs   Lab 02/01/21  0424 01/31/21  0441 01/30/21  0417 01/29/21  2321 01/29/21  0416 01/28/21  0412 01/27/21  0414 01/26/21  0425    133 134  --  137 136 135 134   POTASSIUM 3.3* 3.1* 3.5 3.3* 3.4 3.7 3.8 4.0   CHLORIDE 99 97 99  --  100 103 101 100   CO2 28 25  24  --  26 22 23 19*   ANIONGAP 7 11 11  --  11 11 11 15*   * 260* 152*  --  164* 182* 172* 276*   BUN 55* 37* 44*  --  28 53* 44* 72*   CR 3.76* 3.02* 3.72*  --  2.76* 3.77* 3.18* 4.45*   GFRESTIMATED 13* 16* 13*  --  18* 12* 15* 10*   GFRESTBLACK 14* 19* 15*  --  21* 14* 18* 12*   CHESLEY 9.0 8.8 8.8  --  8.4* 7.9* 8.4* 8.0*   MAG  --  1.9  --  1.9  --   --  2.2 2.8*   PHOS  --  3.4  --   --   --   --  5.1* 7.0*   PROTTOTAL  --   --   --   --  5.3* 5.3* 6.0* 6.0*  6.0*   ALBUMIN  --   --   --   --  1.9* 1.8* 2.2* 2.0*   BILITOTAL  --   --   --   --  0.5 0.6 0.8 0.8   ALKPHOS  --   --   --   --  135 164* 171* 184*   AST  --   --   --   --  9 10 15 11   ALT  --   --   --   --  21 26 30 30     CBC:   Recent Labs   Lab 02/01/21 0424 01/31/21 0441 01/30/21 0417 01/29/21  0416   WBC 10.5 11.8* 11.5* 9.7   RBC 3.52* 3.77* 3.62* 3.21*   HGB 10.5* 11.3* 10.7* 9.5*   HCT 31.8* 34.6* 32.2* 28.3*   MCV 90 92 89 88   MCH 29.8 30.0 29.6 29.6   MCHC 33.0 32.7 33.2 33.6   RDW 14.9 14.9 14.9 14.5    276 198 176       INR: No lab results found in last 7 days.    Glucose:   Recent Labs   Lab 02/01/21  0810 02/01/21  0424 02/01/21  0411 02/01/21  0049 01/31/21 2007 01/31/21  1635 01/31/21  1133 01/31/21 0441 01/31/21 0441 01/30/21  0417 01/30/21  0417 01/29/21  0416 01/29/21  0416 01/28/21  0412 01/28/21  0412 01/27/21  0414 01/27/21  0414   GLC  --  184*  --   --   --   --   --   --  260*  --  152*  --  164*  --  182*  --  172*   *  --  169* 194* 164* 167* 201*   < >  --    < >  --    < >  --    < >  --    < >  --     < > = values in this interval not displayed.       Blood Gas:   Recent Labs   Lab 01/31/21  0441 01/29/21  1547 01/29/21 0416 01/28/21  0404   PHV 7.43  --  7.54* 7.36   PCO2V 41  --  35* 44   PO2V 50*  --  37 44   HCO3V 27  --  30* 25   LASHAUN 2.6  --  6.8  --    O2PER 70.0 100 50.0 50.0       Culture Data   Recent Labs   Lab 01/25/21  1347   CULT No anaerobes isolated  No growth  Culture  negative after 4 days  Culture received and in progress.  Positive AFB results are called as soon as detected.    Final report to follow in 7 to 8 weeks.    Assayed at [facility], "[employer] [address] [place] [id number] [phone]       Virology Data:   Lab Results   Component Value Date    FLUAH1 Not Detected 01/24/2021    FLUAH3 Not Detected 01/24/2021    LH3018 Not Detected 01/24/2021    IFLUB Not Detected 01/24/2021    RSVA Not Detected 01/24/2021    RSVB Not Detected 01/24/2021    PIV1 Not Detected 01/24/2021    PIV2 Not Detected 01/24/2021    PIV3 Not Detected 01/24/2021    HMPV Not Detected 01/24/2021    HRVS Negative 01/24/2021    ADVBE Negative 01/24/2021    ADVC Negative 01/24/2021    ADVC Negative 12/23/2020    ADVC Negative 10/07/2019       Historical CMV results (last 3 of prior testing):  Lab Results   Component Value Date    CMVQNT <137 (A) 01/25/2021    CMVQNT 238 (A) 01/24/2021    CMVQNT 675 (A) 12/23/2020     Lab Results   Component Value Date    CMVLOG <2.1 01/25/2021    CMVLOG 2.4 (H) 01/24/2021    CMVLOG 2.8 (H) 12/23/2020       Urine Studies    Recent Labs   Lab Test 01/24/21  1729 10/21/19  2240   URINEPH 5.0 5.0   NITRITE Negative Negative   LEUKEST Moderate* Large*   WBCU 34* 115*       Most Recent Breeze Pulmonary Function Testing (FVC/FEV1 only)  FVC-Pre   Date Value Ref Range Status   12/09/2020 1.12 L    09/09/2020 1.56 L    03/09/2020 1.57 L    02/19/2020 1.78 L      FVC-%Pred-Pre   Date Value Ref Range Status   12/09/2020 34 %    09/09/2020 47 %    03/09/2020 48 %    02/19/2020 54 %      FEV1-Pre   Date Value Ref Range Status   12/09/2020 0.98 L    09/09/2020 1.10 L    03/09/2020 0.96 L    02/19/2020 0.99 L      FEV1-%Pred-Pre   Date Value Ref Range Status   12/09/2020 37 %    09/09/2020 42 %    03/09/2020 37 %    02/19/2020 38 %        IMAGING    Recent Results (from the past 48 hour(s))   CT Head w/o Contrast    Narrative    CT HEAD W/O CONTRAST 1/30/2021 2:43 PM    Provided  History: Neuro deficit, acute, stroke suspected; fixed  dilated R pupil  ICD-10:    Comparison: None.    Technique: Using multidetector thin collimation helical acquisition  technique, axial, coronal and sagittal CT images from the skull base  to the vertex were obtained without intravenous contrast.     Findings:    No intracranial hemorrhage, mass effect, or midline shift. The  ventricles are proportionate to the cerebral sulci. The gray to white  matter differentiation of the cerebral hemispheres is preserved. The  basal cisterns are patent. Mild periventricular white matter  hypodensity consistent with chronic small vessel ischemic disease.    Mild high right frontoparietal dural calcification/ossification. The  visualized paranasal sinuses are clear. Mild dependent bilateral  mastoid effusions.       Impression    Impression: No acute intracranial pathology.    I have personally reviewed the examination and initial interpretation  and I agree with the findings.    NEAL LOPES MD

## 2021-02-01 NOTE — PROGRESS NOTES
Nephrology Progress Note  02/01/2021         Kecia Blue is a 60 year old female with PMHx most significant for ILD with antisynthetase sydrome s/p BSLT 03/2018 (CMV D+/R+, EBV D+/R+) postoperatively complicated by Left mainstem bronchial stenosis s/p several dilations, left sided aspergillus empyema (10/2019), and CMV viremia who was intubated for HRF at OSH and transferred to [facility] for continued management. Nephrology consutled for dialysis needs.     Interval History :   Mrs Blue had day off of HD yesterday, was positive 1.6L, extubated but resp status remains tenuous.  Pulling 3L as long as BP's support this, next planned run 2/3 although will review for tomorrow based on UF achieved and pulm status.         Assessment & Recommendations:   ESRD-Runs at [place] through [facility] group.  Has atypical HD schedule of Monday and Thursday, has AVF with partial graft which has been challenging to access per RN's.  Have avoided CRRT, running 3-4x/week to aggressively treat any pulm edema portion of resp failure.                 -HD today, 3L of UF as long as BP's allow.                 -Access is L arm AVF/graft, somewhat challenging access.       Volume status-Some edema peripherally, was 1.7L net positive yesterday without run, ordered for 3L UF today depending on how BP's trend.       Electrolytes/pH-K 3.3, bicarb 28, HD today.      Ca/phos/pth-Ca 9.0, Mg and Phos mildly up last check.      Anemia-Hgb 10.5, acute management per team.       Nutrition-Nutren TF.       Seen and discussed with Dr Gamble     Recommendations were communicated to primary team via verbal communication.        Hardy Tran, ADRIÁN CNS  Clinical Nurse Specialist  [phone]      Review of Systems:   I reviewed the following systems:  Gen: No fevers or chills  CV: No CP at rest  Resp: No SOB at rest  GI: No N/V        Physical Exam:   I/O last 3 completed shifts:  In: 1595 [I.V.:635; NG/GT:400]  Out: -    /76    "Pulse 114   Temp 99.6  F (37.6  C) (Axillary)   Resp 29   Ht 1.6 m (5' 2.99\")   Wt 60 kg (132 lb 4.4 oz)   LMP 06/07/2014 (Exact Date)   SpO2 91%   BMI 23.44 kg/m       GENERAL APPEARANCE: Extubated in no distress.    EYES: no scleral icterus  Endo: no moon facies  Pulmonary: Decreased BS  CV: regular rhythm, normal rate - Edema present  GI: soft, non distended  MS: no evidence of inflammation in joints, no muscle tenderness  :  Basilio present  SKIN: warm, dry,  NEURO: No focal deficits.      Labs:   All labs reviewed by me  Electrolytes/Renal -   Recent Labs   Lab Test 02/01/21 0424 01/31/21 0441 01/30/21 0417 01/29/21 2321 01/27/21 0414 01/27/21 0414 01/26/21 0425    133 134  --    < > 135 134   POTASSIUM 3.3* 3.1* 3.5 3.3*   < > 3.8 4.0   CHLORIDE 99 97 99  --    < > 101 100   CO2 28 25 24  --    < > 23 19*   BUN 55* 37* 44*  --    < > 44* 72*   CR 3.76* 3.02* 3.72*  --    < > 3.18* 4.45*   * 260* 152*  --    < > 172* 276*   CHELSEY 9.0 8.8 8.8  --    < > 8.4* 8.0*   MAG  --  1.9  --  1.9  --  2.2 2.8*   PHOS  --  3.4  --   --   --  5.1* 7.0*    < > = values in this interval not displayed.       CBC -   Recent Labs   Lab Test 02/01/21 0424 01/31/21 0441 01/30/21 0417   WBC 10.5 11.8* 11.5*   HGB 10.5* 11.3* 10.7*    276 198       LFTs -   Recent Labs   Lab Test 01/29/21 0416 01/28/21  0412 01/27/21 0414   ALKPHOS 135 164* 171*   BILITOTAL 0.5 0.6 0.8   ALT 21 26 30   AST 9 10 15   PROTTOTAL 5.3* 5.3* 6.0*   ALBUMIN 1.9* 1.8* 2.2*       Iron Panel -   Recent Labs   Lab Test 12/13/18  1033 08/01/18  0921 05/08/18  0709 05/08/18  0709   IRON 16* 93  --  48   IRONSAT 7* 37  --  18   YOLA 302* 571*   < >  --     < > = values in this interval not displayed.           Current Medications:    albumin human         B and C vitamin Complex with folic acid  5 mL Oral or Feeding Tube Daily     budesonide  0.5 mg Nebulization BID     gelatin absorbable  1 each Topical During Hemodialysis " (from stock)     heparin ANTICOAGULANT  5,000 Units Subcutaneous Q8H     insulin aspart  1-12 Units Subcutaneous Q4H     insulin glargine  10 Units Subcutaneous QAM AC     ipratropium - albuterol 0.5 mg/2.5 mg/3 mL  3 mL Nebulization 4x daily     metoprolol tartrate  12.5 mg Oral Once     metoprolol tartrate  37.5 mg Oral BID     nystatin  1,000,000 Units Mouth/Throat 4x Daily     pantoprazole  40 mg Oral QAM AC     polyethylene glycol  17 g Oral or Feeding Tube Daily     posaconazole (NOXAFIL) intermittent infusion  300 mg Intravenous Daily     [Held by provider] predniSONE  2.5 mg Oral or Feeding Tube QPM     [START ON 2/7/2021] predniSONE  20 mg Oral Daily     predniSONE  40 mg Oral BID     [START ON 2/2/2021] predniSONE  40 mg Oral Daily     [Held by provider] predniSONE  5 mg Oral or Feeding Tube QAM     sulfamethoxazole-trimethoprim  295 mg Oral or Feeding Tube BID     tacrolimus  0.5 mg Oral or Feeding Tube QPM     tacrolimus  1 mg Oral or Feeding Tube QAM     valGANciclovir  450 mg Oral Once per day on Mon Thu       dextrose       sodium chloride       - MEDICATION INSTRUCTIONS -

## 2021-02-01 NOTE — PLAN OF CARE
ICU End of Shift Summary. See flowsheets for vital signs and detailed assessment.    Changes this shift:   Pt. On BIPAP 75-90% overnoc.  Currently this am on 100% HFNC.    Slept well.  Another incidence of left pupil 6mm and fixed.  At 4am.  Otherwise pupils were 4 mm equal, round and brisk. TF's at 20 ml/hr. 1 loose stool overnoc.     Plan:   HD today.  To IR for TF advancement.

## 2021-02-01 NOTE — PROGRESS NOTES
HEMODIALYSIS TREATMENT NOTE    Date: 2/1/2021  Time: 3:44 PM    Data:  Pre Wt: 60 kg (132 lb 4.4 oz)   Desired Wt: 56.5 kg   Post Wt: 56.5 kg (124 lb 9 oz)(estimate)  Weight change: 3.5 kg  Ultrafiltration - Post Run Net Total Removed (mL): 3500 mL  Vascular Access Status: CVC  Patent;   Dialyzer Rinse: Light, Streaked  Total Blood Volume Processed: 89 L   Total Dialysis (Treatment) Time: 3.5hrs   Dialysate Bath: K 4, Ca 3  Heparin: None    Lab:   No    Interventions:  CXR done prior to TX.  Lidocaine used per pt req.  Two 15g needles used with no complications.  PT had slight bleeding from previous cannulation; Band-Aid used with no further complications.  PT seen by ophthalmologist d/t left eye dilation.    Assessment:  3.5hr tx, 3.5L removed, Max Critline: -12.5.  PT pleasant with no complaints & asymptomatic.  BP soft at start of TX, able to increase goal to Rx towards middle of TX.  PT tolerated TX well.       Plan:    Next tx per Renal Team.

## 2021-02-01 NOTE — PLAN OF CARE
SLP: Pt off BIPAP but on high flow trach dome at 100% FiO2 t/o the day and receiving dialysis this afternoon. She is currently not appropriate for swallow evaluation. Will continue to follow and complete evaluation as pt is appropriate.

## 2021-02-01 NOTE — CONSULTS
IR was consulted for feeding tube placement. Please place XR Feeding Tube Placement order and call main radiology at [phone] to schedule with RADIOLOGY.    ADRIÁN Oneill CNP  Interventional Radiology  IR on-call pager: [phone]

## 2021-02-01 NOTE — CONSULTS
OPHTHALMOLOGY CONSULT NOTE  02/01/21    Patient: Kecia Blue      HISTORY OF PRESENTING ILLNESS:     Kecia Blue is a 60 year old female who was admitted to OSH on 01/22/2021 for hypoxic respiratory failure and was intubated and subsequently transferred to [facility] for further management. 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 Medical history is notable for hypertension, interstitial lung disease s/p lung transplant (03/2018)), DVT, empyema, of lung, RADHA, and pulmonary hypertension. Ophthalmology is consulted for a dilated left pupil. Nursing staff noted a dilated left pupil since 4:00AM. 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 She gets an injection in the right eye every 9 weeks. The last visit with her eye doctor was on 12/30/2020. Mother has glaucoma.     [facility]  [address]  [phone] Ext. 5.      10+ review of systems were otherwise negative except for that which has been stated above.      OCULAR/MEDICAL/SURGICAL HISTORIES:     Past Ocular History:  Family history of macular degeneration - mother        Past Medical History:   Diagnosis Date     Abdominal pain 10/6/2019     Acute on chronic respiratory failure with hypoxia (H) 2/21/2018     Antisynthetase syndrome (H) 2014     Chronic cough      Chronic infection     recent C diff  8/18     Dehydration 8/1/2018     Dehydration 8/1/2018     Dermatomyositis (H)      Dysplasia of cervix, low grade (ESTRADA 1)      ILD (interstitial lung disease) (H)      Nausea and vomiting 12/4/2018     Osteopenia      PONV (postoperative nausea and vomiting)      Pulmonary hypertension (H)      Raynaud's disease      Seronegative rheumatoid arthritis (H)        Past Surgical History:   Procedure Laterality Date     BRONCHOSCOPY (RIGID OR FLEXIBLE), DIAGNOSTIC N/A 4/10/2018    Procedure: COMBINED BRONCHOSCOPY  (RIGID OR FLEXIBLE), LAVAGE;;  Surgeon: Mariposa Donohue MD;  Location: [facility]     BRONCHOSCOPY (RIGID OR FLEXIBLE), DIAGNOSTIC N/A 12/23/2020    Procedure: BRONCHOSCOPY, WITH BRONCHOALVEOLAR LAVAGE;  Surgeon: Uri Bass MD;  Location: [facility]     BRONCHOSCOPY (RIGID OR FLEXIBLE), DILATE BRONCHUS / TRACHEA N/A 10/11/2018    Procedure: BRONCHOSCOPY (RIGID OR FLEXIBLE), DILATE BRONCHUS / TRACHEA;  Flexible And Rigid Bronchoscopy and Dilation;  Surgeon: Wilber Lin MD;  [facility]     BRONCHOSCOPY FLEXIBLE N/A 3/13/2018    Procedure: BRONCHOSCOPY FLEXIBLE;  Flexible Bronchoscopy ;  Surgeon: Gissell Sanchez MD;  [facility]: [facility]     BRONCHOSCOPY FLEXIBLE N/A 5/9/2018    Procedure: BRONCHOSCOPY FLEXIBLE;;  Surgeon: Wilber Lin MD;  [facility]     BRONCHOSCOPY FLEXIBLE AND RIGID N/A 9/10/2018    Procedure: BRONCHOSCOPY FLEXIBLE AND RIGID;  Flexible and Rigid Bronchoscopy with Balloon Dilation, tissue debulking with cryo, and Right mainstem bronchus stent placement;  Surgeon: Wilber Lin MD;  [facility]     BRONCHOSCOPY RIGID N/A 6/6/2018    Procedure: BRONCHOSCOPY RIGID;;  Surgeon: Lopez Macias MD;  [facility]     BRONCHOSCOPY, DILATE BRONCHUS, STENT BRONCHUS, COMBINED N/A 6/11/2018    Procedure: COMBINED BRONCHOSCOPY, DILATE BRONCHUS, STENT BRONCHUS;  Flexible Bronchoscopy, Balloon Dilation, Bronchial Washings;  Surgeon: Wilber Lin MD;  [facility]     BRONCHOSCOPY, DILATE BRONCHUS, STENT BRONCHUS, COMBINED Right 7/10/2018    Procedure: COMBINED BRONCHOSCOPY, DILATE BRONCHUS, STENT BRONCHUS;  Flexible Bronchoscopy, Balloon Dilation, Bronchial Washings  ;  Surgeon: Wilber Lin MD;  [facility]     BRONCHOSCOPY, DILATE BRONCHUS, STENT BRONCHUS, COMBINED N/A 8/2/2018    Procedure: COMBINED BRONCHOSCOPY, DILATE BRONCHUS, STENT BRONCHUS;  Flexible Bronchoscopy, Bronchial Washings, Balloon Dilation;  Surgeon: Delia  Wilber Warner MD;  [facility]     BRONCHOSCOPY, DILATE BRONCHUS, STENT BRONCHUS, COMBINED N/A 8/20/2018    Procedure: COMBINED BRONCHOSCOPY, DILATE BRONCHUS, STENT BRONCHUS;  Flexible Bronchoscopy, Balloon Dilation;  Surgeon: Wilber Lin MD;  [facility]     BRONCHOSCOPY, DILATE BRONCHUS, STENT BRONCHUS, COMBINED N/A 10/29/2018    Procedure: Flexible Bronchoscopy, Balloon Dilation, Stent Revision, Airway Examination And Therapeutic Suctioning, Cyro Tumor Debulking;  Surgeon: Wilber Lin MD;  [facility]     BRONCHOSCOPY, DILATE BRONCHUS, STENT BRONCHUS, COMBINED N/A 11/20/2018    Procedure: Rigid Bronchoscopy, Stent Removal and dilitation;  Surgeon: Wilber Lin MD;  [facility]     BRONCHOSCOPY, DILATE BRONCHUS, STENT BRONCHUS, COMBINED N/A 12/14/2018    Procedure: Flexible And Rigid Bronchoscopy, Balloon Dilation, Bronchial Washing;  Surgeon: Wilber Lin MD;  [facility]     BRONCHOSCOPY, DILATE BRONCHUS, STENT BRONCHUS, COMBINED N/A 1/17/2019    Procedure: Flexible And Rigid Bronchoscopy, Balloon Dilation.;  Surgeon: Wilber Lin MD;  [facility]     BRONCHOSCOPY, DILATE BRONCHUS, STENT BRONCHUS, COMBINED N/A 3/7/2019    Procedure: Flexible and Rigid Bronchoscopy, Bronchial Washing, Balloon Dilation;  Surgeon: Wilber Lin MD;  [facility]     BRONCHOSCOPY, DILATE BRONCHUS, STENT BRONCHUS, COMBINED N/A 6/6/2019    Procedure: Rigid and Flexible Bronchoscopy, Balloon Dilation;  Surgeon: Wilber Lin MD;  [facility]     BRONCHOSCOPY, DILATE BRONCHUS, STENT BRONCHUS, COMBINED N/A 10/11/2019    Procedure: Flexible and Rigid Bronchoscopy, Balloon Dilation, Bronchoalveolar Lagave;  Surgeon: Wilber Lin MD;  [facility]     CV RIGHT HEART CATH MEASUREMENTS RECORDED N/A 3/10/2020    Procedure: CV RIGHT HEART CATH;  Surgeon: Wai Garcia MD;  Location:  HE[facility] CARDIAC CATH LAB     ENT SURGERY       tonsillectomy as a child     ESOPHAGOSCOPY, GASTROSCOPY, DUODENOSCOPY (EGD), COMBINED N/A 10/29/2018    Procedure: COMBINED ESOPHAGOSCOPY, GASTROSCOPY, DUODENOSCOPY (EGD) with biopsies and polypectomy;  Surgeon: Chente Bloom MD;  [facility]     INSERT EXTRACORPORAL MEMBRANE OXYGENATOR ADULT (OUTSIDE OR) N/A 2/27/2018    Procedure: INSERT EXTRACORPORAL MEMBRANE OXYGENATOR ADULT (OUTSIDE OR);  INSERT EXTRACORPORAL MEMBRANE OXYGENATOR ADULT (OUTSIDE OR) ;  Surgeon: Toby Hernandez MD;  [facility]     IR CVC TUNNEL PLACEMENT > 5 YRS OF AGE  10/25/2019     IR PARACENTESIS  1/8/2020     IR THORACENTESIS  9/13/2019     IR TRANSCATHETER BIOPSY  1/8/2020     no prior surgery       REMOVE EXTRACORPORAL MEMBRANE OXYGENATOR ADULT N/A 3/3/2018    Procedure: REMOVE EXTRACORPORAL MEMBRANE OXYGENATOR ADULT;  Removal of Right Femoral Venous and Right Axillary Arterial Extracorporeal Membrane Oxygenator;  Surgeon: Toby Hernandez MD;  [facility]     TRANSPLANT LUNG RECIPIENT SINGLE X2 Bilateral 3/1/2018    Procedure: TRANSPLANT LUNG RECIPIENT SINGLE X2;  Median Sternotomy, Extracorporeal Membrane Oxygenator, bilateral sequential lung transplant;  Surgeon: Toby Hernandez MD;  [facility]       EXAMINATION:     Base Eye Exam     Visual Acuity       Right Left    Near cc 20/100-1 20/25-2          Tonometry (Tonopen, 2:48 PM)       Right Left    Pressure 17 16          Pupils       Dark Light Shape React APD    Right 7 4 Round Brisk     Left 9 7 Round Slow no apd          Visual Fields       Left Right     Full Full          Extraocular Movement       Right Left     Full Full          Neuro/Psych     Oriented x3: Yes    Mood/Affect: Normal          Dilation     Both eyes: 1.0% Mydriacyl, 2.5% Mehul Synephrine @ 2:48 PM            Slit Lamp and Fundus Exam     External Exam       Right Left    External Normal Normal          Portable Slit Lamp Exam       Right Left     Lids/Lashes Normal Normal    Conjunctiva/Sclera White and quiet White and quiet    Cornea Clear Clear    Anterior Chamber Deep and quiet Deep and quiet    Iris Round and reactive Round and reactive    Lens NSC NSC          Fundus Exam       Right Left    Vitreous Normal Normal    Disc Crowded disc, no edema, no pallor Crowded disc, no edema, no pallor    C/D Ratio Vertical 0.1 0.1    C/D Ratio Horizontal 0.1 0.1    Macula Geographic atropy; 1 choroidal nevus, 1 DD round, no SRF, no SRF Normal    Vessels Normal Normal    Periphery 1 choroidal nevus inferior arcade, 1DD round, no SRF, no SRF; attached Normal; attached                Labs/Studies/Imaging Performed  Impression: No acute intracranial pathology.     I have personally reviewed the examination and initial interpretation  and I agree with the findings.     NEAL LOPES MD     ASSESSMENT/PLAN:     #Pupil Abnormalities, right eye > left eye.   - Kecia Blue is a 60 year old female who presents with hypoxic respiratory failure due to pneumonia. Ophthalmology is consulted for a dilated left pupil since 4:00AM. Vision is 20/100 in the right eye and 20/25 in the left eye; vision unchanged per patient. Patient mentioned a history of reduced vision in the right eye secondary to prior artery occlusion. An afferent pupillary defect is noted in the right eye, which is likely due to her prior artery occlusion. Left pupil is larger than the right pupil on light illumination. Extraocular motilities are full. Anterior segment exam is notable for age related cataracts in both eyes. Posterior segment is significant for geographic atrophy in the macula and 2 choroidal nevi of the right eye. Optic disc is crowded in both eyes - no evidence of pallor or edema.      Plan:   - Will touch base with our neuro-ophthalmology team.     #History of Artery Occlusion, right eye  - Per patient history. She follows a retinal specialist.    #Geographic Atrophy, right eye  -  Receiving injections every 9 weeks. Follow up with retinal specialist as planned.     #Cataract, both eyes  - Not visually significant. Monitor.     #Choroidal Nevi, right eye  - No high risk features. Low risk for malignancy - follow up with regular eye exam.            It is our pleasure to participate in this patient's care and treatment. Please contact us with any further questions or concerns.      Miracle Mckeon OD  Adjunct   Ophthalmology   Pager: [phone]

## 2021-02-01 NOTE — PLAN OF CARE
4C OT Cancel. Pt with other providers upon attempt x 2 this AM, in dialysis this PM. Rescheduled.

## 2021-02-01 NOTE — PROGRESS NOTES
Brief Ophthalmology Note    Date: February 1, 2021  Time: 5:54 PM    S:   Kecia Blue is a 60 year old female who is admitted for acute hypoxic respiratory failure secondary to pneumonia.   Ophthalmology was consulted for dilated left pupil, noted on bedside nursing exam. Per chart review, patient notes chronic poor right eye vision secondary to a retinal artery occlusion, which has remained stable during this admission. She denies extraocular motility deficits. She denies diplopia. She denies acute vision concerns/complaints    O:   Please see Dr. Mckeon's note for complete physical exam:  -Visual acuity 20/100 right eye, 20/25 left eye  -Pupil exam notable for aniscoria, more prominent in light, with right pupil measuring ~4 mm and left pupil measuring ~7 mm. Both pupils react to light. Question of right eye APD.  -Extraocular motility normal, without limitations  -Slit lamp exam and dilated fundus exam without acute changes.    A/P:   Kecia Blue is a 60 year old female who is admitted for who is admitted for acute hypoxic respiratory failure secondary to pneumonia and was noted to have a dilated left pupil.    Anisocoria (right pupil smaller than left), more pronounced in light, possibly secondary to nebulizer therapy  -Pupil exam notable for aniscoria, more prominent in light, with right pupil measuring ~4 mm and left pupil measuring ~7 mm. Both pupils react to light. Question of right eye APD, possibly chronic given patient's ocular history (see below)  -Neurologic exam otherwise unremarkable  -CN3 palsy unlikely in this setting, as patient has no lid or extraocular movement abnormalities and no other neurologic findings  -CT head (1/30/21) unremarkable    -Patient's anisocoria possibly secondary to nebulizer therapy, which may cause transient pupillary abnormalities, for acute hypoxic respiratory failure.   -No further imaging/ophthalmic assessment necessary at this time. Ophthalmology will  re-assess patient's pupils once patient is off nebulizers for at least 48 hours. Please page ophthalmology once off nebulizer therapy.    Question of APD, right eye  History of retinal artery occlusion, right eye  -Patient with poor right eye vision and likely chronic right APD, in the setting of history of retinal artery occlusion  -Follows with [facility] for intravitreal injections; plan to obtain records from this clinic to determine if right APD has been documented on prior exams.    Choroidal nevi, right eye  Geographic atrophy, right eye  -Continue follow-up at [facility].    Case discussed with Dr. Silverio, neuro-ophthalmology fellow.      Tory Costa MD  Ophthalmology Resident, PGY-2

## 2021-02-02 ENCOUNTER — APPOINTMENT (OUTPATIENT)
Dept: GENERAL RADIOLOGY | Facility: CLINIC | Age: 61
End: 2021-02-02
Attending: INTERNAL MEDICINE
Payer: COMMERCIAL

## 2021-02-02 ENCOUNTER — APPOINTMENT (OUTPATIENT)
Dept: PHYSICAL THERAPY | Facility: CLINIC | Age: 61
End: 2021-02-02
Attending: INTERNAL MEDICINE
Payer: COMMERCIAL

## 2021-02-02 LAB
ANION GAP SERPL CALCULATED.3IONS-SCNC: 8 MMOL/L (ref 3–14)
BUN SERPL-MCNC: 35 MG/DL (ref 7–30)
CALCIUM SERPL-MCNC: 9 MG/DL (ref 8.5–10.1)
CHLORIDE SERPL-SCNC: 100 MMOL/L (ref 94–109)
CO2 SERPL-SCNC: 26 MMOL/L (ref 20–32)
CREAT SERPL-MCNC: 2.63 MG/DL (ref 0.52–1.04)
ERYTHROCYTE [DISTWIDTH] IN BLOOD BY AUTOMATED COUNT: 15.1 % (ref 10–15)
GFR SERPL CREATININE-BSD FRML MDRD: 19 ML/MIN/{1.73_M2}
GLUCOSE BLDC GLUCOMTR-MCNC: 141 MG/DL (ref 70–99)
GLUCOSE BLDC GLUCOMTR-MCNC: 147 MG/DL (ref 70–99)
GLUCOSE BLDC GLUCOMTR-MCNC: 169 MG/DL (ref 70–99)
GLUCOSE BLDC GLUCOMTR-MCNC: 192 MG/DL (ref 70–99)
GLUCOSE BLDC GLUCOMTR-MCNC: 195 MG/DL (ref 70–99)
GLUCOSE SERPL-MCNC: 187 MG/DL (ref 70–99)
HCT VFR BLD AUTO: 31.4 % (ref 35–47)
HGB BLD-MCNC: 10 G/DL (ref 11.7–15.7)
MAGNESIUM SERPL-MCNC: 1.8 MG/DL (ref 1.6–2.3)
MCH RBC QN AUTO: 29.2 PG (ref 26.5–33)
MCHC RBC AUTO-ENTMCNC: 31.8 G/DL (ref 31.5–36.5)
MCV RBC AUTO: 92 FL (ref 78–100)
PLATELET # BLD AUTO: 259 10E9/L (ref 150–450)
POTASSIUM SERPL-SCNC: 3.9 MMOL/L (ref 3.4–5.3)
RBC # BLD AUTO: 3.43 10E12/L (ref 3.8–5.2)
SODIUM SERPL-SCNC: 134 MMOL/L (ref 133–144)
WBC # BLD AUTO: 10.5 10E9/L (ref 4–11)

## 2021-02-02 PROCEDURE — 250N000013 HC RX MED GY IP 250 OP 250 PS 637: Performed by: INTERNAL MEDICINE

## 2021-02-02 PROCEDURE — 250N000011 HC RX IP 250 OP 636: Performed by: NURSE PRACTITIONER

## 2021-02-02 PROCEDURE — 97530 THERAPEUTIC ACTIVITIES: CPT | Mod: GP

## 2021-02-02 PROCEDURE — 83735 ASSAY OF MAGNESIUM: CPT | Performed by: STUDENT IN AN ORGANIZED HEALTH CARE EDUCATION/TRAINING PROGRAM

## 2021-02-02 PROCEDURE — 999N000157 HC STATISTIC RCP TIME EA 10 MIN

## 2021-02-02 PROCEDURE — 80048 BASIC METABOLIC PNL TOTAL CA: CPT | Performed by: STUDENT IN AN ORGANIZED HEALTH CARE EDUCATION/TRAINING PROGRAM

## 2021-02-02 PROCEDURE — 94660 CPAP INITIATION&MGMT: CPT

## 2021-02-02 PROCEDURE — 250N000013 HC RX MED GY IP 250 OP 250 PS 637: Performed by: STUDENT IN AN ORGANIZED HEALTH CARE EDUCATION/TRAINING PROGRAM

## 2021-02-02 PROCEDURE — 250N000012 HC RX MED GY IP 250 OP 636 PS 637: Performed by: NURSE PRACTITIONER

## 2021-02-02 PROCEDURE — 250N000009 HC RX 250: Performed by: NURSE PRACTITIONER

## 2021-02-02 PROCEDURE — 74340 X-RAY GUIDE FOR GI TUBE: CPT

## 2021-02-02 PROCEDURE — 250N000013 HC RX MED GY IP 250 OP 250 PS 637: Performed by: NURSE PRACTITIONER

## 2021-02-02 PROCEDURE — 999N001017 HC STATISTIC GLUCOSE BY METER IP

## 2021-02-02 PROCEDURE — 250N000012 HC RX MED GY IP 250 OP 636 PS 637: Performed by: INTERNAL MEDICINE

## 2021-02-02 PROCEDURE — 250N000011 HC RX IP 250 OP 636: Performed by: STUDENT IN AN ORGANIZED HEALTH CARE EDUCATION/TRAINING PROGRAM

## 2021-02-02 PROCEDURE — 99233 SBSQ HOSP IP/OBS HIGH 50: CPT | Mod: 25 | Performed by: INTERNAL MEDICINE

## 2021-02-02 PROCEDURE — 44500 INTRO GASTROINTESTINAL TUBE: CPT

## 2021-02-02 PROCEDURE — 94667 MNPJ CHEST WALL 1ST: CPT

## 2021-02-02 PROCEDURE — 85027 COMPLETE CBC AUTOMATED: CPT | Performed by: STUDENT IN AN ORGANIZED HEALTH CARE EDUCATION/TRAINING PROGRAM

## 2021-02-02 PROCEDURE — 94668 MNPJ CHEST WALL SBSQ: CPT

## 2021-02-02 PROCEDURE — 99291 CRITICAL CARE FIRST HOUR: CPT | Mod: GC | Performed by: INTERNAL MEDICINE

## 2021-02-02 PROCEDURE — 74340 X-RAY GUIDE FOR GI TUBE: CPT | Mod: 26 | Performed by: RADIOLOGY

## 2021-02-02 PROCEDURE — 94640 AIRWAY INHALATION TREATMENT: CPT | Mod: 76

## 2021-02-02 PROCEDURE — 200N000002 HC R&B ICU UMMC

## 2021-02-02 PROCEDURE — 258N000003 HC RX IP 258 OP 636: Performed by: NURSE PRACTITIONER

## 2021-02-02 PROCEDURE — 272N000079 HC NUTRITION PRODUCT RENAL BASIC LITER

## 2021-02-02 PROCEDURE — 97110 THERAPEUTIC EXERCISES: CPT | Mod: GP

## 2021-02-02 PROCEDURE — 94640 AIRWAY INHALATION TREATMENT: CPT

## 2021-02-02 PROCEDURE — 250N000009 HC RX 250: Performed by: RADIOLOGY

## 2021-02-02 RX ORDER — METOPROLOL TARTRATE 25 MG/1
50 TABLET, FILM COATED ORAL 2 TIMES DAILY
Status: DISCONTINUED | OUTPATIENT
Start: 2021-02-02 | End: 2021-02-04

## 2021-02-02 RX ORDER — LEVALBUTEROL INHALATION SOLUTION 1.25 MG/3ML
1.25 SOLUTION RESPIRATORY (INHALATION) 4 TIMES DAILY
Status: DISCONTINUED | OUTPATIENT
Start: 2021-02-02 | End: 2021-02-11

## 2021-02-02 RX ORDER — MAGNESIUM SULFATE 1 G/100ML
1 INJECTION INTRAVENOUS ONCE
Status: COMPLETED | OUTPATIENT
Start: 2021-02-02 | End: 2021-02-02

## 2021-02-02 RX ORDER — LIDOCAINE HYDROCHLORIDE 20 MG/ML
5 SOLUTION OROPHARYNGEAL ONCE
Status: COMPLETED | OUTPATIENT
Start: 2021-02-02 | End: 2021-02-02

## 2021-02-02 RX ADMIN — HEPARIN SODIUM 5000 UNITS: 5000 INJECTION, SOLUTION INTRAVENOUS; SUBCUTANEOUS at 16:58

## 2021-02-02 RX ADMIN — METOPROLOL TARTRATE 50 MG: 25 TABLET, FILM COATED ORAL at 20:27

## 2021-02-02 RX ADMIN — INSULIN ASPART 3 UNITS: 100 INJECTION, SOLUTION INTRAVENOUS; SUBCUTANEOUS at 20:28

## 2021-02-02 RX ADMIN — BUDESONIDE 0.5 MG: 0.5 INHALANT ORAL at 07:44

## 2021-02-02 RX ADMIN — INSULIN GLARGINE 10 UNITS: 100 INJECTION, SOLUTION SUBCUTANEOUS at 08:33

## 2021-02-02 RX ADMIN — LEVALBUTEROL HYDROCHLORIDE 1.25 MG: 1.25 SOLUTION RESPIRATORY (INHALATION) at 21:13

## 2021-02-02 RX ADMIN — TACROLIMUS 0.5 MG: 5 CAPSULE ORAL at 18:09

## 2021-02-02 RX ADMIN — PREDNISONE 40 MG: 20 TABLET ORAL at 08:49

## 2021-02-02 RX ADMIN — NYSTATIN 1000000 UNITS: 500000 SUSPENSION ORAL at 16:57

## 2021-02-02 RX ADMIN — INSULIN ASPART 1 UNITS: 100 INJECTION, SOLUTION INTRAVENOUS; SUBCUTANEOUS at 08:28

## 2021-02-02 RX ADMIN — INSULIN ASPART 1 UNITS: 100 INJECTION, SOLUTION INTRAVENOUS; SUBCUTANEOUS at 12:33

## 2021-02-02 RX ADMIN — SULFAMETHOXAZOLE AND TRIMETHOPRIM 295 MG: 200; 40 SUSPENSION ORAL at 08:48

## 2021-02-02 RX ADMIN — BUDESONIDE 0.5 MG: 0.5 INHALANT ORAL at 21:13

## 2021-02-02 RX ADMIN — Medication 40 MG: at 08:50

## 2021-02-02 RX ADMIN — METOCLOPRAMIDE HYDROCHLORIDE 10 MG: 5 INJECTION INTRAMUSCULAR; INTRAVENOUS at 09:57

## 2021-02-02 RX ADMIN — HEPARIN SODIUM 5000 UNITS: 5000 INJECTION, SOLUTION INTRAVENOUS; SUBCUTANEOUS at 08:45

## 2021-02-02 RX ADMIN — IPRATROPIUM BROMIDE AND ALBUTEROL SULFATE 3 ML: .5; 3 SOLUTION RESPIRATORY (INHALATION) at 12:00

## 2021-02-02 RX ADMIN — Medication 12.5 MG: at 11:10

## 2021-02-02 RX ADMIN — NYSTATIN 1000000 UNITS: 500000 SUSPENSION ORAL at 20:26

## 2021-02-02 RX ADMIN — Medication 10 MG: at 21:38

## 2021-02-02 RX ADMIN — TACROLIMUS 1 MG: 5 CAPSULE ORAL at 08:52

## 2021-02-02 RX ADMIN — INSULIN ASPART 2 UNITS: 100 INJECTION, SOLUTION INTRAVENOUS; SUBCUTANEOUS at 04:06

## 2021-02-02 RX ADMIN — IPRATROPIUM BROMIDE AND ALBUTEROL SULFATE 3 ML: .5; 3 SOLUTION RESPIRATORY (INHALATION) at 07:44

## 2021-02-02 RX ADMIN — SULFAMETHOXAZOLE AND TRIMETHOPRIM 295 MG: 200; 40 SUSPENSION ORAL at 20:27

## 2021-02-02 RX ADMIN — Medication 37.5 MG: at 08:50

## 2021-02-02 RX ADMIN — SODIUM CHLORIDE 300 MG: 9 INJECTION, SOLUTION INTRAVENOUS at 08:53

## 2021-02-02 RX ADMIN — MAGNESIUM SULFATE 1 G: 1 INJECTION INTRAVENOUS at 12:36

## 2021-02-02 RX ADMIN — Medication 5 ML: at 08:48

## 2021-02-02 RX ADMIN — LIDOCAINE HYDROCHLORIDE 8 ML: 20 SOLUTION ORAL; TOPICAL at 10:18

## 2021-02-02 RX ADMIN — NYSTATIN 1000000 UNITS: 500000 SUSPENSION ORAL at 13:14

## 2021-02-02 RX ADMIN — INSULIN ASPART 3 UNITS: 100 INJECTION, SOLUTION INTRAVENOUS; SUBCUTANEOUS at 17:08

## 2021-02-02 ASSESSMENT — ACTIVITIES OF DAILY LIVING (ADL)
ADLS_ACUITY_SCORE: 15
ADLS_ACUITY_SCORE: 13
ADLS_ACUITY_SCORE: 13
ADLS_ACUITY_SCORE: 15

## 2021-02-02 ASSESSMENT — MIFFLIN-ST. JEOR: SCORE: 1118

## 2021-02-02 NOTE — PROVIDER NOTIFICATION
Notified MICU team about A fibr with RVR in the 150 while getting to use the commode . Patient symptomatic with High Rate . HR slowed down to the 110'-120's after getting back to bed . Patient on scheduled metoprolol . No prn medication . No new order issued . Plan to  Monitor patient closely .

## 2021-02-02 NOTE — PLAN OF CARE
ICU End of Shift Summary. See flowsheets for vital signs and detailed assessment.    Changes this shift: Remains on HFNC @ 100% FiO2 and flow of 60 LPM. Earlier in the day her SpO2's dropped to mid-80's with exertion and recovered in about a minute, maintaining 89-91% at rest. After dialyzing for 3.5 kg off she has maintained resting SpO2's of 93-94 % and reports feeling that she's able to breathe more easily.  She stood and took steps and pivoted using a walker. She was steady and reported feeling stronger.    Plan: Continue with current plan of care.

## 2021-02-02 NOTE — PLAN OF CARE
SLP HOLD: Per discussion with RN, pt currently requiring 60LPM with 100% FiO2 HFNC to maintain O2 sats >85%. Pt is not appropriate for PO trials due to tenuous respiratory status. Will follow up as appropriate.

## 2021-02-02 NOTE — PLAN OF CARE
Major Shift Events:  pleasant . Slept between cares . Denied any pain . SOB with activity . R Pupil> L pupil .  Hemodynamically stable . Afebrile. Tele Afib 90's-120  at rest . . HR increase up 150 while using the commode . MD notified .  Desaturate to lower 80's with activity ,  recovered slowly . On HFNC 100%% With 60 LPM while awake , placed on BIPAP FIO2 65%-100% . Able to maintain saturation >88% . Had 2 loose BM during the shift .   Plan: IR NJ Placement . Patient needs Afib rate control .   For vital signs and complete assessments, please see documentation flowsheets.      Problem: ARDS (Acute Respiratory Distress Syndrome)  Goal: Effective Oxygenation  Outcome: No Change     Problem: Hypertension Comorbidity  Goal: Blood Pressure in Desired Range  Outcome: No Change  Intervention: Maintain Blood Pressure Management  Recent Flowsheet Documentation

## 2021-02-02 NOTE — PROGRESS NOTES
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 She was transferred to OSH ICU 1/22 and intubated and requiring vasopressors. 1/24 transferred to [facility] Medical ICU service for ongoing management, extubated on 1/29 to BiPAP.      CHANGES and MAJOR THINGS TODAY:   - Continue BiPAP/HFNC weaning FiO2 as tolerated  - Increase metoprolol to 50 BID  - Post-pyloric feeding tube placed with rads, initiate TF  - Continue treatment with bactrim, prednisone taper, will monitor patient for improvement over the week, if improvement not seen by the end of the week will obtain repeat CT chest    PLAN:     Neuro:  #Insomnia  - Melatonin     #Unequal pupils  Patient with alternating fixed and dilated pupils. CTH unremarkable. Neuro exam otherwise unremarkable  - Optho consulted, no acute concerns     Pulmonary:  #Acute Hypoxic Respiratory Failure, improving  Suspected 2/2 PJP. OSH CT 1/23 + bilateral ground glass opacities and consolidative lung infiltrates centrally distributed to the upper lobes and RLL, . Covid-19 negative X3 at OSH.   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  - Positive PJP PCR on 1/28 with positive fungitel   - Continue PTA tacrolimus  - Prednisone taper, holding home dose for now  - Pulmonary lung transplant team consulted and following, appreciate recs     Cardiovascular:  #Shock, resolved  #Hx Hypertension  Patient was transferred on Levophed. Right heart catheterization 03/10/2020 concluded right sided filling pressures mildly elevated, mild PHTN, mild right sided filling pressures, and normal cardiac output. Last ECHO 10/21/20 with EF 60-65%, normal LV & RV function.   - Patient weaned off pressors 1/26 currently hemodynamically stable off pressors  - Hold PTA coreg and amlodipine     # Atrial fibrillation w/ RVR  Patient with pAF with rates into 130-40s. Likely in the setting of acute pulmoary illness  - Increase Metoprolol to 50 BID     GI/Nutrition:  # Portal HTN  Liver biopsy positive for congestive hepatopathy. Previous had ascites thought to be r/t elevated right sided heart pressures. Last saw GI on 12/21/20 and patient endorsed that her ascites is no longer present.   - NTD     # Nutrition  - NJT placed by rads today, TF goal rate of 40    # Constipation  Patient reportedly has history of constipation with bactrim use, now stooling  - Scheduled miralax ordered  - PRN dulcolax and senna-doc ordered     Renal/Fluids/Electrolytes:  # ESRD on iHD twice weekly (M/Th).   # Anion gap metabolic acidosis r/t ESRD, uremia- Resolved  Outpatient dry body weight 56.5 kg. Ran dialysis 1/26, 1/28, 1/30  - Nephrology Consulted   - Continued dialysis per neph recs  - I&O  - Daily Weights   - Trend BMP     # Hypokalemia  - Dialysis as above     Endocrine:  # Seronegative RA with Raynaud's   - NTD, monitor   # Hyperglycemia, steroid induced  - High intensity sliding scale insulin  - 10 units glargine daily     ID:  # Sepsis   Suspected related to bacterial pneumonia. OSH CT 1/23 + bilateral ground glass opacities and consolidative lung infiltrates centrally  distributed to the upper lobes and RLL. Covid-19 negative X3 at OSH. Procalcitonin negative. Urine strep, CMV, and RPP negative.  - PJP PCR positive, fungitell positive as well  - Continue treatment with bactrim, prednisone taper, will monitor patient for improvement over the week, if improvement not seen by the end of the week will obtain repeat CT chest  - Bronch completed, KOH and Gram not revealing,  with neutrophil predominance, other labs pending  - s/p thora 1/25, fluid exudative, NGTD  - s/p 7 day course of empiric ceftriaxone     # Thrush  -nystatin swish and spit     # Chronic/Stable right aspergillus empyema   - Continue PTA posaconazole, will monitor QTc     Hematology:    # Anemia r/t renal disease, chronic stable   # Thrombocytopenia r/t critical illness, resolved   # Leukocytosis, infection/steroids, improving  Takes aranesp 25 mcg every four weeks, last dose 01/14.    - Daily CBC     Musculoskeletal:  #Weakness/Deconditioning of critical illness  - PT/OT consulted     Skin:  # Dermatomyositis antitryptase syndrome   - Noted, NTD     General Cares/Prophylaxis:    DVT Prophylaxis: Heparin SQ  GI Prophylaxis: PPI  Restraints: Not indicated  Family Communication:  updated at bedside  Code Status: Full      Lines/tubes/drains:  - HD fistula left forearm  - CVC RIJ     Disposition:  - Medical ICU    Patient seen and findings/plan discussed with medical ICU staff, Dr. James.    Tomas Rolle     ====================================  INTERVAL HISTORY:   Overnight patient was noted to have HR into the 140-150s with any level of activity. NAEO. Patient reports she feels comfortable this AM. Denies CP, SOB, palpitations, presyncopal symptoms, changes in bowel/bladder habits or abdominal pain. She is notably distressed this AM because she had been researching information on mortality in PCP pneumonia.    OBJECTIVE:   1. VITAL SIGNS:   Temp:  [97.7  F (36.5  C)-99.1  F (37.3  C)] 99.1  F (37.3   C)  Pulse:  [] 104  Resp:  [20-44] 26  BP: (101-131)/(66-92) 131/83  FiO2 (%):  [65 %-100 %] 80 %  SpO2:  [82 %-99 %] 90 %  FiO2 (%): (S) 80 %  Resp: 26    2. INTAKE/ OUTPUT:   I/O last 3 completed shifts:  In: 1180 [I.V.:330; NG/GT:370]  Out: 3550 [Urine:50; Other:3500]    3. PHYSICAL EXAMINATION:  General: Laying in bed. No acute distress. On HFNC  HEENT: Following commands. Responding appropriately. No focal deficits.  Neuro: A&Ox3, non focal. Reflexes intact. Leftpupil dilated without constriction to light (right constricts when light shined in left)   Pulm/Resp: Clear/diminished bilaterally  CV: RRR  Abdomen: Soft, non-distended, non-tender  Incisions/Skin: Left AV fistula with thrill and bruit    4. LABS:   Arterial Blood Gases   Recent Labs   Lab 01/29/21  1547 01/27/21  0540 01/27/21  0316 01/27/21  0147   PH 7.46* 7.39 7.41 7.39   PCO2 41 41 38 40   PO2 56* 79* 59* 45*   HCO3 29* 25 24 24     Complete Blood Count   Recent Labs   Lab 02/02/21 0435 02/01/21  0424 01/31/21  0441 01/30/21  0417   WBC 10.5 10.5 11.8* 11.5*   HGB 10.0* 10.5* 11.3* 10.7*    275 276 198     Basic Metabolic Panel  Recent Labs   Lab 02/02/21  0435 02/01/21  0424 01/31/21  0441 01/30/21  0417    134 133 134   POTASSIUM 3.9 3.3* 3.1* 3.5   CHLORIDE 100 99 97 99   CO2 26 28 25 24   BUN 35* 55* 37* 44*   CR 2.63* 3.76* 3.02* 3.72*   * 184* 260* 152*     Liver Function Tests  Recent Labs   Lab 01/29/21  0416 01/28/21  0412 01/27/21  0414   AST 9 10 15   ALT 21 26 30   ALKPHOS 135 164* 171*   BILITOTAL 0.5 0.6 0.8   ALBUMIN 1.9* 1.8* 2.2*     Coagulation Profile  No lab results found in last 7 days.    5. RADIOLOGY:   Recent Results (from the past 24 hour(s))   XR Feeding Tube Placement    Narrative    Feeding tube placement, 2/2/2021.    Comparison: none.    History: Feeding tube needed for nutrition.     Fluoroscopy time:  1.6 minute[s]    Technique: After injection of lidocaine gel into the left  nostril,  previously placed feeding tube was advanced under fluoroscopic  guidance. Guidewire was reinserted under fluoroscopy. Patient was  given single dose of 10 mg IV Reglan, previously ordered as PRN  medication from primary team for feeding tube placement.    Findings: The feeding tube is advanced with the tip in the expected  location of the distal (fourth) portion of the duodenum. A small  amount of barium and water was injected to define anatomy. Guidewire  was retracted stored on patient white board. Feeding tube was flushed  thoroughly with water. Feeding tube was secured with previously placed  nasal bridle.      Impression    Impression: Uncomplicated feeding tube placement with tip in the  expected location of the distal (fourth) portion of the duodenum.    I, SUNITA ADKINS MD, attest that I was immediately available to  provide guidance and assistance during the entirety of the procedure.  The examination was performed portable in the ICU therefore a  radiologist was not directly present during tube placement.    I have personally reviewed the examination and initial interpretation  and I agree with the findings.    SUNITA ADKINS MD

## 2021-02-02 NOTE — PROGRESS NOTES
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 Nephrology consutled for dialysis needs.     Interval History :   Mrs Blue had HD yesterday, pulled 3.5L of UF without issue.  EDW is 52kg but getting bed wt's now, if able to ambulate it would be helpful to get a standing wt, plan for 3L of UF with run tomorrow.  Checking iron stores.           Assessment & Recommendations:   ESRD-Runs at [facility] through [facility].  Has atypical HD schedule of Monday and Thursday, has AVF with partial graft which has been challenging to access per RN's.  Have avoided CRRT, running 3-4x/week to aggressively treat any pulm edema portion of resp failure.                 -Holding on run today, plan for run tomorrow.                 -Access is L arm AVF/graft, somewhat challenging access.       Volume status-Pulled 3.5L of UF yesterday without issue, close to euvolemic, will try to pull 3L tomorrow again, getting a standing wt would be helpful.       Electrolytes/pH-K 3.9, bicarb 26, HD today.      Ca/phos/pth-Ca 9.0, Mg and Phos mildly up last check.      Anemia-Hgb 10.0, acute management per team.  Will check iron stores.       Nutrition-Nutren TF.       Seen and discussed with Dr Gamble     Recommendations were communicated to primary team via verbal communication.     ADRIÁN Lo CNS  Clinical Nurse Specialist  [phone]    Review of Systems:   I reviewed the following systems:  Gen: No fevers or chills  CV: No CP at rest  Resp: No SOB at rest  GI: No N/V    Physical Exam:   I/O last 3 completed shifts:  In: 1180 [I.V.:330;  "NG/GT:370]  Out: 3550 [Urine:50; Other:3500]   /86   Pulse 98   Temp 99.1  F (37.3  C) (Axillary)   Resp 23   Ht 1.6 m (5' 2.99\")   Wt 56.9 kg (125 lb 7.1 oz)   LMP 06/07/2014 (Exact Date)   SpO2 92%   BMI 22.23 kg/m       GENERAL APPEARANCE: Extubated in no distress.    EYES: no scleral icterus  Endo: no moon facies  Pulmonary: Decreased BS  CV: regular rhythm, normal rate - Edema present  GI: soft, non distended  MS: no evidence of inflammation in joints, no muscle tenderness  :  Basilio present  SKIN: warm, dry,  NEURO: No focal deficits.      Labs:   All labs reviewed by me  Electrolytes/Renal -   Recent Labs   Lab Test 02/02/21 0435 02/01/21 0424 01/31/21 0441 01/29/21 2321 01/29/21 2321 01/27/21 0414 01/27/21 0414 01/26/21 0425    134 133   < >  --    < > 135 134   POTASSIUM 3.9 3.3* 3.1*   < > 3.3*   < > 3.8 4.0   CHLORIDE 100 99 97   < >  --    < > 101 100   CO2 26 28 25   < >  --    < > 23 19*   BUN 35* 55* 37*   < >  --    < > 44* 72*   CR 2.63* 3.76* 3.02*   < >  --    < > 3.18* 4.45*   * 184* 260*   < >  --    < > 172* 276*   CHELSEY 9.0 9.0 8.8   < >  --    < > 8.4* 8.0*   MAG 1.8  --  1.9  --  1.9  --  2.2 2.8*   PHOS  --   --  3.4  --   --   --  5.1* 7.0*    < > = values in this interval not displayed.       CBC -   Recent Labs   Lab Test 02/02/21 0435 02/01/21 0424 01/31/21 0441   WBC 10.5 10.5 11.8*   HGB 10.0* 10.5* 11.3*    275 276       LFTs -   Recent Labs   Lab Test 01/29/21 0416 01/28/21  0412 01/27/21  0414   ALKPHOS 135 164* 171*   BILITOTAL 0.5 0.6 0.8   ALT 21 26 30   AST 9 10 15   PROTTOTAL 5.3* 5.3* 6.0*   ALBUMIN 1.9* 1.8* 2.2*       Iron Panel -   Recent Labs   Lab Test 12/13/18  1033 08/01/18  0921 05/08/18  0709 05/08/18  0709   IRON 16* 93  --  48   IRONSAT 7* 37  --  18   YOLA 302* 571*   < >  --     < > = values in this interval not displayed.           Current Medications:    B and C vitamin Complex with folic acid  5 mL Oral or Feeding " Tube Daily     budesonide  0.5 mg Nebulization BID     heparin ANTICOAGULANT  5,000 Units Subcutaneous Q8H     insulin aspart  1-12 Units Subcutaneous Q4H     insulin glargine  10 Units Subcutaneous QAM AC     ipratropium - albuterol 0.5 mg/2.5 mg/3 mL  3 mL Nebulization 4x daily     metoprolol tartrate  50 mg Oral BID     nystatin  1,000,000 Units Mouth/Throat 4x Daily     pantoprazole  40 mg Oral QAM AC     polyethylene glycol  17 g Oral or Feeding Tube Daily     posaconazole (NOXAFIL) intermittent infusion  300 mg Intravenous Daily     [Held by provider] predniSONE  2.5 mg Oral or Feeding Tube QPM     [START ON 2/7/2021] predniSONE  20 mg Oral Daily     predniSONE  40 mg Oral Daily     [Held by provider] predniSONE  5 mg Oral or Feeding Tube QAM     sulfamethoxazole-trimethoprim  295 mg Oral or Feeding Tube BID     tacrolimus  0.5 mg Oral or Feeding Tube QPM     tacrolimus  1 mg Oral or Feeding Tube QAM     valGANciclovir  450 mg Oral Once per day on Mon Thu       dextrose       sodium chloride Stopped (02/01/21 6732)

## 2021-02-02 NOTE — CONSULTS
IR consulted again for NJ tube placement. Please call main radiology ([phone]) for this procedure.    ADRIÁN Oneill CNP  Interventional Radiology  IR on-call pager: [phone]

## 2021-02-02 NOTE — PROGRESS NOTES
Pulmonary Medicine  Cystic Fibrosis - Lung Transplant Team  Progress Note  2021       Patient: Kecia Blue  MRN: [id number]  : 1960 (age 60 year old)  Transplant: 3/1/2018 (Lung), POD#[id number]  Admission date: 2021    Assessment & Plan:     Kecia Blue is a 60 year old female with PMH of BSLT () for ILD with antisynthetase sydrome, postoperative course c/b right mainstem bronchial stenosis s/p several dilations, left-sided Aspergillus empyema () s/p amphotericin beads to empyema (2020), EBV viremia, CMV viremia, and ESRD on HD .  Patient was admitted to OSH on  for acute hypoxemic respiratory failure and new lung opacities. Intubated  and transferred to [facility] for ongoing management.  Extubated  but reintubated - for progressive hypoxemia. Hypoxia persists and has remained on high O2 requirements (HFNC during the day, and BiPAP while sleeping). Atrial fibrillation  and started on metoprolol.      Recommendations:  - Radiology planning to reattempt at NJ given high aspiration risk with BiPAP  - Continue Bactrim for PJP and Steno coverage  - Transition Duoneb to Xopenex QID (ordered)  - Repeat imaging per MICU, agree with repeat CT chest  if hypoxia/symptoms not improving  - Steroid burst and taper per MICU, resume chronic prednisone upon burst/taper completion  - Tacrolimus level on Wed (2/3), ordered  - Please let our team know if planning to start diltiazem given known interaction with tacrolimus     Acute hypoxemic respiratory failure:   Right post-obstructive Stenotrophomonas and Eikenella pneumonia:  PJP pneumonia:  Septic shock: Presented to OSH ED with increased SOB and hypoxia, initially requiring 4L NC but continued to decline and subsequently intubated . Hemodynamic instability after intubation requiring pressors, transferred to [facility]. Afebrile but with leukocytosis. COVID and respiratory panel negative. PTA bronch (20)  with moderate airway obstruction and RML/RLL mucous plugging, cultures with Stenotrophomonas and Eikenella (IV ceftazidime 1/13-1/19). OSH CT chest with new multifocal GGO bilaterally and increased left loculated pleural effusion. Repeat bronch (1/24) with RUL BAL with thick copious secretions to RML/RLL. + PJP PCR from BAL (1/24). S/p thoracentsis (1/25)), cultures NGTD. BD glucan slightly elevated at 261 (from 106), but within her recent range. Extubated to 4L on 1/26. Reintubated 1/27-1/29 for progressive hypoxemia. Most recent CXR (2/1) for interval follow up with slightly improved extensive bilateral mixed interstitial and airspace opacities with lower lung predominance  Hypoxia persists and remains on high O2 requirements: HFNC % FiO2/60LPM during the day and BiPAP 12/6 80%FiO2. Minimal dyspnea at rest, increased with activity.  - Blood cultures and fungal blood cultures (1/24) NGTD  - BAL cultures (1/24) with PJP PCR and BDG fungitell positive at 261 (1/28/21) and increased from prior 106 (9/9/20)  - ABX per MICU: Continue Bactrim for PJP and Steno coverage. (S/p ceftriaxone 1/29-2/1 and Zosyn 1/24-1/29 for Eikenella coverage)  - Stress dose steroids and taper per MICU  - Repeat imaging per MICU, agree with repeat CT chest 2/5 if hypoxia/symptoms not improving  - Pulmonary toilet with chest physiotherapy QID and nebs: Xopenex QID (2/2, s/p PTA Duoneb), Mucomyst QID, and Pulmicort BID  - BiPAP as needed for dyspnea, wean to HFNC and wean FiO2 as able     Aspergillus empyema (left-sided): First noted 10/8/19. CT scan on 7/17/20 with increased mass-like density, likely pleural-based, in LLL area s/p needle aspiration (8/13/20) with Aspergillus fumigatus on cultures s/p intrapleural amphotericin bead placement (11/20).  Following with ID as OP.  LFTs stable on admission (ALP chronically mildly elevated). CT chest with increased loculated left pleural effusion s/p thoracentesis (1/25), exudative with 87%  neutrophils, very high LDL (6308), cytology normal. Cultures NGTD.  - Pleural cultures (1/25) NGTD  - PTA posaconazole, indefinite course; level 1/26 therapeutic, QTc and LFT monitoring per primary     S/p bilateral lung transplant for ILD 2/2 CTD:   Right mainstem bronchial stenosis (s/p dilation 3/2019): Last seen in pulmonary clinic 12/2. DSA (1/25) not detected. Continued right mainstem stenosis noted with PTA bronch on 12/23/20, mild on 1/24 bronch.  - Follow up with IP as OP for evaluation of right bronchial stenosis     Immunosuppression: On 2 drug IST d/t recurrent infections  - Tacrolimus 1 mg qAM / 0.5 mg qPM.  Goal level 8-10. Recheck level on Wed, 2/3 (ordered)  - Holding chronic prednisone while on stress dose steroids (5mg qAM and 2.5mg qPM)     Prophylaxis:   - Bactrim treatment dose as above. PTA had not been on PJP ppx since 7/28/20 as CD4 > 200.      H/o CMV viremia: CMV D+/R+.  CMV <137 (1/25).  - VGCV for CMV ppx with stress dose steroids (1/26)      EBV viremia: Level 12/9/20 mildly elevated to 10K (log 4) from prior 3K (3.5 log) on 9/9/20, repeat (1/25/21) decreased to 5619 copies (log of 3.8).  - Recheck in 1 month-> 2/22     Oropharyngeal candidiasis: White tongue plaque noted 2/1.  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 Chronic iHD M/Th via AVF with partial graft.   - Monitoring of tacrolimus as above  - Dialysis management per nephrology (M/Th iHD)     Atrial fibrillation w/ RVR: H/o paroxysmal AF, last 10/2019. Recurrence 1/30 with RVR (-140s). Likely in the setting of acute pulmonary illness.  - Management per MICU. Metoprolol (1/30, increased 1/31 and 2/1)  - Please let our team know if planning to start diltiazem given known interaction with tacrolimus     We appreciate the excellent care provided by the MICU team. Recommendations communicated via this note. Will continue to follow along closely, please do not hesitate to call with any questions or  "concerns.     Patient discussed with Dr. Erika Ibarra, APRN, CNP   Inpatient Nurse Practitioner  Pulmonary CF/Transplant     Subjective & Interval History:     Remains on HFNC % FiO2 and 60 LPM during the day, and on BiPAP overnight at 12/6 and 80%FiO2. Reports mild dyspnea at rest, and moderate DESHPANDE are slowly improving. No change to productive cough with thick sputum. No chest pain or wheezing. Afib with RVR continues with rates 100-120, up to 140s with activity. MICU plans to increase metoprolol. TF at trophic rate currently, pending tube advancement by radiology today, then plan to increase rate. Notes some fullness, but no discomfort or nausea. Stools loose x2 yesterday.     Review of Systems:     C: No fever, no chills, slight increase in weight since admission  INTEGUMENTARY/SKIN: No rash or obvious new lesions  ENT/MOUTH: No sore throat, no sinus pain, no nasal congestion or drainage  RESP: See interval history  CV: No chest pain, no palpitations, + peripheral edema, no orthopnea  GI: No nausea, no vomiting, no change in stools, no reflux symptoms  : No dysuria  MUSCULOSKELETAL: No myalgias, no arthralgias  ENDOCRINE: Blood sugars persistently 140-200s  NEURO: No headache, no numbness or tingling  PSYCHIATRIC: Mood stable    Physical Exam:     Vital signs:  Temp: 99.1  F (37.3  C) Temp src: Axillary BP: 128/86 Pulse: 98   Resp: 23 SpO2: 92 % O2 Device: BiPAP/CPAP Oxygen Delivery: 60 LPM Height: 160 cm (5' 2.99\") Weight: 56.9 kg (125 lb 7.1 oz)  I/O:     Intake/Output Summary (Last 24 hours) at 2/2/2021 1322  Last data filed at 2/2/2021 1200  Gross per 24 hour   Intake 840 ml   Output 3500 ml   Net -2660 ml     Constitutional: awake, sitting in chair, in no apparent distress.   HEENT: Eyes with pink conjunctivae, anicteric. Oral mucosa moist without lesions. Neck supple without lymphadenopathy.   PULM: Deminished air flow bilaterally to bases L>R. Scattered crackles, no rhonchi, no " wheezes. Non-labored breathing on HFNC.  CV: Normal S1 and S2. irregular. No murmur, gallop, or rub. Trace BLE peripheral edema.   ABD: NABS, soft, nontender, nondistended.    MSK: Moves all extremities. No apparent muscle wasting.   NEURO: Alert and oriented, conversant.   SKIN: Warm, dry. No rash on limited exam.   PSYCH: Mood stable, calm      Lines, Drains, and Devices:  CVC Double Lumen 10/25/19 Right Internal jugular (Active)   Number of days: 466       Hemodialysis Vascular Access Arteriovenous fistula Left Arm (Active)   Site Assessment WDL;Bruit present;Thrill present 02/02/21 1200   Cannulation Needle Size 15 02/01/21 1133   Dressing Intervention Dressing changed 02/01/21 1600   Dressing Status Clean, dry, intact 02/02/21 0400   Verification by x-ray Not applicable 01/28/21 1520   Hand Off Report Yes 01/30/21 1250   Number of days: 8       CVC TRIPLE LUMEN Right Internal jugular (Active)   Site Assessment WDL 02/02/21 1200   Dressing Type Chlorhexidine sponge;Transparent 02/02/21 0400   Dressing Status clean 02/02/21 0400   Dressing Intervention dressing changed 01/31/21 0400   Dressing Change Due 02/07/21 02/02/21 0400   Line Necessity yes, meets criteria 02/02/21 0400   Blue - Status saline locked 02/02/21 0400   Blue - Cap Change Due 02/05/21 02/02/21 0400   Brown - Status saline locked 02/02/21 0400   Brown - Cap Change Due 02/05/21 02/02/21 0400   White - Status saline locked 02/02/21 0400   White - Cap Change Due 02/05/21 02/02/21 0400   Phlebitis Scale 0-->no symptoms 02/02/21 0400   Infiltration? no 02/02/21 0400   Infiltration Scale 0 02/02/21 0400   Infiltration Site Treatment Method  None 02/02/21 0400   CVC Comment c/d/i 02/02/21 0400   Number of days: 9     Data:     LABS    CMP:   Recent Labs   Lab 02/02/21  0435 02/01/21  0424 01/31/21  0441 01/30/21  0417 01/29/21  2321 01/29/21  0416 01/28/21  0412 01/27/21  0414    134 133 134  --  137 136 135   POTASSIUM 3.9 3.3* 3.1* 3.5 3.3* 3.4  3.7 3.8   CHLORIDE 100 99 97 99  --  100 103 101   CO2 26 28 25 24  --  26 22 23   ANIONGAP 8 7 11 11  --  11 11 11   * 184* 260* 152*  --  164* 182* 172*   BUN 35* 55* 37* 44*  --  28 53* 44*   CR 2.63* 3.76* 3.02* 3.72*  --  2.76* 3.77* 3.18*   GFRESTIMATED 19* 13* 16* 13*  --  18* 12* 15*   GFRESTBLACK 22* 14* 19* 15*  --  21* 14* 18*   CHELSEY 9.0 9.0 8.8 8.8  --  8.4* 7.9* 8.4*   MAG 1.8  --  1.9  --  1.9  --   --  2.2   PHOS  --   --  3.4  --   --   --   --  5.1*   PROTTOTAL  --   --   --   --   --  5.3* 5.3* 6.0*   ALBUMIN  --   --   --   --   --  1.9* 1.8* 2.2*   BILITOTAL  --   --   --   --   --  0.5 0.6 0.8   ALKPHOS  --   --   --   --   --  135 164* 171*   AST  --   --   --   --   --  9 10 15   ALT  --   --   --   --   --  21 26 30     CBC:   Recent Labs   Lab 02/02/21  0435 02/01/21  0424 01/31/21  0441 01/30/21  0417   WBC 10.5 10.5 11.8* 11.5*   RBC 3.43* 3.52* 3.77* 3.62*   HGB 10.0* 10.5* 11.3* 10.7*   HCT 31.4* 31.8* 34.6* 32.2*   MCV 92 90 92 89   MCH 29.2 29.8 30.0 29.6   MCHC 31.8 33.0 32.7 33.2   RDW 15.1* 14.9 14.9 14.9    275 276 198       INR: No lab results found in last 7 days.    Glucose:   Recent Labs   Lab 02/02/21  1232 02/02/21  0825 02/02/21  0435 02/02/21  0403 02/01/21  2304 02/01/21  1958 02/01/21  1559 02/01/21  0424 02/01/21  0424 01/31/21  0441 01/31/21  0441 01/30/21  0417 01/30/21  0417 01/29/21  0416 01/29/21  0416 01/28/21 0412 01/28/21 0412   GLC  --   --  187*  --   --   --   --   --  184*  --  260*  --  152*  --  164*  --  182*   * 141*  --  169* 144* 159* 177*   < >  --    < >  --    < >  --    < >  --    < >  --     < > = values in this interval not displayed.       Blood Gas:   Recent Labs   Lab 01/31/21  0441 01/29/21  1547 01/29/21 0416 01/28/21  0404   PHV 7.43  --  7.54* 7.36   PCO2V 41  --  35* 44   PO2V 50*  --  37 44   HCO3V 27  --  30* 25   LASHAUN 2.6  --  6.8  --    O2PER 70.0 100 50.0 50.0       Culture Data No results for input(s): CULT in  the last 168 hours.    Virology Data:   Lab Results   Component Value Date    FLUAH1 Not Detected 01/24/2021    FLUAH3 Not Detected 01/24/2021    PE6436 Not Detected 01/24/2021    IFLUB Not Detected 01/24/2021    RSVA Not Detected 01/24/2021    RSVB Not Detected 01/24/2021    PIV1 Not Detected 01/24/2021    PIV2 Not Detected 01/24/2021    PIV3 Not Detected 01/24/2021    HMPV Not Detected 01/24/2021    HRVS Negative 01/24/2021    ADVBE Negative 01/24/2021    ADVC Negative 01/24/2021    ADVC Negative 12/23/2020    ADVC Negative 10/07/2019       Historical CMV results (last 3 of prior testing):  Lab Results   Component Value Date    CMVQNT <137 (A) 01/25/2021    CMVQNT 238 (A) 01/24/2021    CMVQNT 675 (A) 12/23/2020     Lab Results   Component Value Date    CMVLOG <2.1 01/25/2021    CMVLOG 2.4 (H) 01/24/2021    CMVLOG 2.8 (H) 12/23/2020       Urine Studies    Recent Labs   Lab Test 01/24/21  1729 10/21/19  2240   URINEPH 5.0 5.0   NITRITE Negative Negative   LEUKEST Moderate* Large*   WBCU 34* 115*       Most Recent [place]ze Pulmonary Function Testing (FVC/FEV1 only)  FVC-Pre   Date Value Ref Range Status   12/09/2020 1.12 L    09/09/2020 1.56 L    03/09/2020 1.57 L    02/19/2020 1.78 L      FVC-%Pred-Pre   Date Value Ref Range Status   12/09/2020 34 %    09/09/2020 47 %    03/09/2020 48 %    02/19/2020 54 %      FEV1-Pre   Date Value Ref Range Status   12/09/2020 0.98 L    09/09/2020 1.10 L    03/09/2020 0.96 L    02/19/2020 0.99 L      FEV1-%Pred-Pre   Date Value Ref Range Status   12/09/2020 37 %    09/09/2020 42 %    03/09/2020 37 %    02/19/2020 38 %        IMAGING    Recent Results (from the past 48 hour(s))   XR Chest Port 1 View    Narrative    Exam: XR CHEST PORT 1 VW, 2/1/2021 11:30 AM    Indication: dyspnea, s/p lung transplant    Comparison: 1/29/2021    Findings:   Semiupright AP view of the chest. Patient is rotated to the right.  Posterior changes of bilateral lung transplantation with mediastinal  clips  and intact sternotomy wires. Right IJ central line projecting  over the low SVC. Enteric tube coursing below the left hemidiaphragm  with tip projecting over the region of the stomach. Enteric contrast  is no longer visualized.  Trachea is midline. Cardiac silhouette is obscured. Redemonstration of  extensive bilateral mixed interstitial and airspace opacities, with  lower lobe predominance. Unchanged aeration of the bilateral upper  lungs. Bilateral dense retrocardiac and basilar opacities. No  appreciable pneumothorax. Probable small bilateral pleural effusions.       Impression    Impression:   1. Right IJ central venous catheter and enteric tube are stable in  position.  2. Relatively unchanged extensive bilateral mixed interstitial and  airspace opacities with lower lung predominance. Probable superimposed  bilateral small pleural effusions.    I have personally reviewed the examination and initial interpretation  and I agree with the findings.    ANA CRISOSTOMO, DO   XR Feeding Tube Placement    Narrative    Feeding tube placement, 2/2/2021.    Comparison: none.    History: Feeding tube needed for nutrition.     Fluoroscopy time:  1.6 minute[s]    Technique: After injection of lidocaine gel into the left nostril,  previously placed feeding tube was advanced under fluoroscopic  guidance. Guidewire was reinserted under fluoroscopy. Patient was  given single dose of 10 mg IV Reglan, previously ordered as PRN  medication from primary team for feeding tube placement.    Findings: The feeding tube is advanced with the tip in the expected  location of the distal (fourth) portion of the duodenum. A small  amount of barium and water was injected to define anatomy. Guidewire  was retracted stored on patient white board. Feeding tube was flushed  thoroughly with water. Feeding tube was secured with previously placed  nasal bridle.      Impression    Impression: Uncomplicated feeding tube placement with tip in  the  expected location of the distal (fourth) portion of the duodenum.    I, SUNITA ADKINS MD, attest that I was immediately available to  provide guidance and assistance during the entirety of the procedure.  The examination was performed portable in the ICU therefore a  radiologist was not directly present during tube placement.    I have personally reviewed the examination and initial interpretation  and I agree with the findings.    SUNITA ADKINS MD

## 2021-02-03 ENCOUNTER — ANESTHESIA EVENT (OUTPATIENT)
Dept: INTENSIVE CARE | Facility: CLINIC | Age: 61
End: 2021-02-03
Payer: COMMERCIAL

## 2021-02-03 ENCOUNTER — APPOINTMENT (OUTPATIENT)
Dept: GENERAL RADIOLOGY | Facility: CLINIC | Age: 61
End: 2021-02-03
Attending: INTERNAL MEDICINE
Payer: COMMERCIAL

## 2021-02-03 ENCOUNTER — ANESTHESIA (OUTPATIENT)
Dept: INTENSIVE CARE | Facility: CLINIC | Age: 61
End: 2021-02-03
Payer: COMMERCIAL

## 2021-02-03 LAB
ANION GAP SERPL CALCULATED.3IONS-SCNC: 10 MMOL/L (ref 3–14)
BASE DEFICIT BLDA-SCNC: 0.8 MMOL/L
BASE DEFICIT BLDA-SCNC: 2.3 MMOL/L
BASE EXCESS BLDA CALC-SCNC: 0.2 MMOL/L
BASE EXCESS BLDA CALC-SCNC: 0.6 MMOL/L
BASE EXCESS BLDA CALC-SCNC: 0.6 MMOL/L
BUN SERPL-MCNC: 57 MG/DL (ref 7–30)
CALCIUM SERPL-MCNC: 8.9 MG/DL (ref 8.5–10.1)
CHLORIDE SERPL-SCNC: 98 MMOL/L (ref 94–109)
CO2 SERPL-SCNC: 25 MMOL/L (ref 20–32)
CREAT SERPL-MCNC: 3.45 MG/DL (ref 0.52–1.04)
ERYTHROCYTE [DISTWIDTH] IN BLOOD BY AUTOMATED COUNT: 15.3 % (ref 10–15)
GFR SERPL CREATININE-BSD FRML MDRD: 14 ML/MIN/{1.73_M2}
GLUCOSE BLDC GLUCOMTR-MCNC: 154 MG/DL (ref 70–99)
GLUCOSE BLDC GLUCOMTR-MCNC: 169 MG/DL (ref 70–99)
GLUCOSE BLDC GLUCOMTR-MCNC: 210 MG/DL (ref 70–99)
GLUCOSE BLDC GLUCOMTR-MCNC: 217 MG/DL (ref 70–99)
GLUCOSE BLDC GLUCOMTR-MCNC: 220 MG/DL (ref 70–99)
GLUCOSE BLDC GLUCOMTR-MCNC: 235 MG/DL (ref 70–99)
GLUCOSE BLDC GLUCOMTR-MCNC: 93 MG/DL (ref 70–99)
GLUCOSE SERPL-MCNC: 138 MG/DL (ref 70–99)
GRAM STN SPEC: NORMAL
GRAM STN SPEC: NORMAL
HCO3 BLD-SCNC: 23 MMOL/L (ref 21–28)
HCO3 BLD-SCNC: 24 MMOL/L (ref 21–28)
HCO3 BLD-SCNC: 25 MMOL/L (ref 21–28)
HCO3 BLD-SCNC: 25 MMOL/L (ref 21–28)
HCO3 BLD-SCNC: 26 MMOL/L (ref 21–28)
HCT VFR BLD AUTO: 31.3 % (ref 35–47)
HGB BLD-MCNC: 10 G/DL (ref 11.7–15.7)
IRON SATN MFR SERPL: 36 % (ref 15–46)
IRON SERPL-MCNC: 51 UG/DL (ref 35–180)
LABORATORY COMMENT REPORT: NORMAL
Lab: NORMAL
MAGNESIUM SERPL-MCNC: 2.1 MG/DL (ref 1.6–2.3)
MCH RBC QN AUTO: 29 PG (ref 26.5–33)
MCHC RBC AUTO-ENTMCNC: 31.9 G/DL (ref 31.5–36.5)
MCV RBC AUTO: 91 FL (ref 78–100)
MRSA DNA SPEC QL NAA+PROBE: NEGATIVE
O2/TOTAL GAS SETTING VFR VENT: 100 %
O2/TOTAL GAS SETTING VFR VENT: 100 %
O2/TOTAL GAS SETTING VFR VENT: 60 %
O2/TOTAL GAS SETTING VFR VENT: 70 %
O2/TOTAL GAS SETTING VFR VENT: ABNORMAL %
OXYHGB MFR BLD: 89 % (ref 92–100)
OXYHGB MFR BLD: 98 % (ref 92–100)
PCO2 BLD: 36 MM HG (ref 35–45)
PCO2 BLD: 39 MM HG (ref 35–45)
PCO2 BLD: 40 MM HG (ref 35–45)
PCO2 BLD: 43 MM HG (ref 35–45)
PCO2 BLD: 45 MM HG (ref 35–45)
PH BLD: 7.35 PH (ref 7.35–7.45)
PH BLD: 7.37 PH (ref 7.35–7.45)
PH BLD: 7.39 PH (ref 7.35–7.45)
PH BLD: 7.42 PH (ref 7.35–7.45)
PH BLD: 7.44 PH (ref 7.35–7.45)
PHOSPHATE SERPL-MCNC: 2.6 MG/DL (ref 2.5–4.5)
PLATELET # BLD AUTO: 290 10E9/L (ref 150–450)
PO2 BLD: 165 MM HG (ref 80–105)
PO2 BLD: 49 MM HG (ref 80–105)
PO2 BLD: 61 MM HG (ref 80–105)
PO2 BLD: 62 MM HG (ref 80–105)
PO2 BLD: 83 MM HG (ref 80–105)
POTASSIUM SERPL-SCNC: 3.3 MMOL/L (ref 3.4–5.3)
PROCALCITONIN SERPL-MCNC: 0.42 NG/ML
RBC # BLD AUTO: 3.45 10E12/L (ref 3.8–5.2)
SARS-COV-2 RNA RESP QL NAA+PROBE: NEGATIVE
SODIUM SERPL-SCNC: 133 MMOL/L (ref 133–144)
SPECIMEN SOURCE: NORMAL
TACROLIMUS BLD-MCNC: 5.4 UG/L (ref 5–15)
TIBC SERPL-MCNC: 143 UG/DL (ref 240–430)
TME LAST DOSE: NORMAL H
WBC # BLD AUTO: 21.7 10E9/L (ref 4–11)

## 2021-02-03 PROCEDURE — 999N000065 XR CHEST PORT 1 VW

## 2021-02-03 PROCEDURE — 87641 MR-STAPH DNA AMP PROBE: CPT | Performed by: STUDENT IN AN ORGANIZED HEALTH CARE EDUCATION/TRAINING PROGRAM

## 2021-02-03 PROCEDURE — 94645 CONT INHLJ TX EACH ADDL HOUR: CPT

## 2021-02-03 PROCEDURE — 250N000011 HC RX IP 250 OP 636: Performed by: STUDENT IN AN ORGANIZED HEALTH CARE EDUCATION/TRAINING PROGRAM

## 2021-02-03 PROCEDURE — 94640 AIRWAY INHALATION TREATMENT: CPT

## 2021-02-03 PROCEDURE — 250N000012 HC RX MED GY IP 250 OP 636 PS 637: Performed by: INTERNAL MEDICINE

## 2021-02-03 PROCEDURE — 250N000011 HC RX IP 250 OP 636: Performed by: NURSE ANESTHETIST, CERTIFIED REGISTERED

## 2021-02-03 PROCEDURE — 94640 AIRWAY INHALATION TREATMENT: CPT | Mod: 76

## 2021-02-03 PROCEDURE — 250N000009 HC RX 250: Performed by: NURSE PRACTITIONER

## 2021-02-03 PROCEDURE — 83550 IRON BINDING TEST: CPT | Performed by: CLINICAL NURSE SPECIALIST

## 2021-02-03 PROCEDURE — 250N000013 HC RX MED GY IP 250 OP 250 PS 637: Performed by: NURSE PRACTITIONER

## 2021-02-03 PROCEDURE — 999N001017 HC STATISTIC GLUCOSE BY METER IP

## 2021-02-03 PROCEDURE — 250N000012 HC RX MED GY IP 250 OP 636 PS 637: Performed by: NURSE PRACTITIONER

## 2021-02-03 PROCEDURE — 83735 ASSAY OF MAGNESIUM: CPT | Performed by: CLINICAL NURSE SPECIALIST

## 2021-02-03 PROCEDURE — 94002 VENT MGMT INPAT INIT DAY: CPT

## 2021-02-03 PROCEDURE — 999N000157 HC STATISTIC RCP TIME EA 10 MIN

## 2021-02-03 PROCEDURE — 94660 CPAP INITIATION&MGMT: CPT

## 2021-02-03 PROCEDURE — 999N000065 XR ABDOMEN PORT 1 VW

## 2021-02-03 PROCEDURE — 258N000003 HC RX IP 258 OP 636: Performed by: NURSE PRACTITIONER

## 2021-02-03 PROCEDURE — 71045 X-RAY EXAM CHEST 1 VIEW: CPT | Mod: 26 | Performed by: RADIOLOGY

## 2021-02-03 PROCEDURE — 250N000009 HC RX 250: Performed by: STUDENT IN AN ORGANIZED HEALTH CARE EDUCATION/TRAINING PROGRAM

## 2021-02-03 PROCEDURE — 87070 CULTURE OTHR SPECIMN AEROBIC: CPT | Performed by: NURSE PRACTITIONER

## 2021-02-03 PROCEDURE — 250N000009 HC RX 250: Performed by: NURSE ANESTHETIST, CERTIFIED REGISTERED

## 2021-02-03 PROCEDURE — 999N000011 HC STATISTIC ANESTHESIA CASE

## 2021-02-03 PROCEDURE — 82810 BLOOD GASES O2 SAT ONLY: CPT | Performed by: STUDENT IN AN ORGANIZED HEALTH CARE EDUCATION/TRAINING PROGRAM

## 2021-02-03 PROCEDURE — 84100 ASSAY OF PHOSPHORUS: CPT | Performed by: CLINICAL NURSE SPECIALIST

## 2021-02-03 PROCEDURE — 82803 BLOOD GASES ANY COMBINATION: CPT | Performed by: STUDENT IN AN ORGANIZED HEALTH CARE EDUCATION/TRAINING PROGRAM

## 2021-02-03 PROCEDURE — 83540 ASSAY OF IRON: CPT | Performed by: CLINICAL NURSE SPECIALIST

## 2021-02-03 PROCEDURE — 250N000011 HC RX IP 250 OP 636: Performed by: INTERNAL MEDICINE

## 2021-02-03 PROCEDURE — 99291 CRITICAL CARE FIRST HOUR: CPT | Mod: 25 | Performed by: INTERNAL MEDICINE

## 2021-02-03 PROCEDURE — 71045 X-RAY EXAM CHEST 1 VIEW: CPT

## 2021-02-03 PROCEDURE — 36415 COLL VENOUS BLD VENIPUNCTURE: CPT | Performed by: NURSE PRACTITIONER

## 2021-02-03 PROCEDURE — 94668 MNPJ CHEST WALL SBSQ: CPT

## 2021-02-03 PROCEDURE — 36600 WITHDRAWAL OF ARTERIAL BLOOD: CPT

## 2021-02-03 PROCEDURE — 258N000003 HC RX IP 258 OP 636: Performed by: INTERNAL MEDICINE

## 2021-02-03 PROCEDURE — 87040 BLOOD CULTURE FOR BACTERIA: CPT | Performed by: NURSE PRACTITIONER

## 2021-02-03 PROCEDURE — 250N000013 HC RX MED GY IP 250 OP 250 PS 637: Performed by: STUDENT IN AN ORGANIZED HEALTH CARE EDUCATION/TRAINING PROGRAM

## 2021-02-03 PROCEDURE — 999N000015 HC STATISTIC ARTERIAL MONITORING DAILY

## 2021-02-03 PROCEDURE — 94644 CONT INHLJ TX 1ST HOUR: CPT

## 2021-02-03 PROCEDURE — 87205 SMEAR GRAM STAIN: CPT | Performed by: NURSE PRACTITIONER

## 2021-02-03 PROCEDURE — U0005 INFEC AGEN DETEC AMPLI PROBE: HCPCS | Performed by: STUDENT IN AN ORGANIZED HEALTH CARE EDUCATION/TRAINING PROGRAM

## 2021-02-03 PROCEDURE — 200N000002 HC R&B ICU UMMC

## 2021-02-03 PROCEDURE — 99233 SBSQ HOSP IP/OBS HIGH 50: CPT | Performed by: INTERNAL MEDICINE

## 2021-02-03 PROCEDURE — 80048 BASIC METABOLIC PNL TOTAL CA: CPT | Performed by: CLINICAL NURSE SPECIALIST

## 2021-02-03 PROCEDURE — 250N000011 HC RX IP 250 OP 636: Performed by: ANESTHESIOLOGY

## 2021-02-03 PROCEDURE — 250N000011 HC RX IP 250 OP 636: Performed by: NURSE PRACTITIONER

## 2021-02-03 PROCEDURE — U0003 INFECTIOUS AGENT DETECTION BY NUCLEIC ACID (DNA OR RNA); SEVERE ACUTE RESPIRATORY SYNDROME CORONAVIRUS 2 (SARS-COV-2) (CORONAVIRUS DISEASE [COVID-19]), AMPLIFIED PROBE TECHNIQUE, MAKING USE OF HIGH THROUGHPUT TECHNOLOGIES AS DESCRIBED BY CMS-2020-01-R: HCPCS | Performed by: STUDENT IN AN ORGANIZED HEALTH CARE EDUCATION/TRAINING PROGRAM

## 2021-02-03 PROCEDURE — 250N000013 HC RX MED GY IP 250 OP 250 PS 637: Performed by: INTERNAL MEDICINE

## 2021-02-03 PROCEDURE — 80197 ASSAY OF TACROLIMUS: CPT | Performed by: NURSE PRACTITIONER

## 2021-02-03 PROCEDURE — 999N000157 HC STATISTIC RCP TIME EA 10 MIN: Mod: 76

## 2021-02-03 PROCEDURE — 36620 INSERTION CATHETER ARTERY: CPT | Performed by: ANESTHESIOLOGY

## 2021-02-03 PROCEDURE — 258N000003 HC RX IP 258 OP 636: Performed by: NURSE ANESTHETIST, CERTIFIED REGISTERED

## 2021-02-03 PROCEDURE — 272N000010 HC KIT CATH ARTERIAL EXT SUPPLY

## 2021-02-03 PROCEDURE — 87640 STAPH A DNA AMP PROBE: CPT | Performed by: STUDENT IN AN ORGANIZED HEALTH CARE EDUCATION/TRAINING PROGRAM

## 2021-02-03 PROCEDURE — 85027 COMPLETE CBC AUTOMATED: CPT | Performed by: CLINICAL NURSE SPECIALIST

## 2021-02-03 PROCEDURE — 74018 RADEX ABDOMEN 1 VIEW: CPT | Mod: 26 | Performed by: RADIOLOGY

## 2021-02-03 PROCEDURE — 258N000003 HC RX IP 258 OP 636: Performed by: ANESTHESIOLOGY

## 2021-02-03 PROCEDURE — 250N000009 HC RX 250: Performed by: INTERNAL MEDICINE

## 2021-02-03 PROCEDURE — 84145 PROCALCITONIN (PCT): CPT | Performed by: CLINICAL NURSE SPECIALIST

## 2021-02-03 PROCEDURE — 999N000128 HC STATISTIC PERIPHERAL IV START W/O US GUIDANCE

## 2021-02-03 RX ORDER — PIPERACILLIN SODIUM, TAZOBACTAM SODIUM 2; .25 G/10ML; G/10ML
2.25 INJECTION, POWDER, LYOPHILIZED, FOR SOLUTION INTRAVENOUS EVERY 6 HOURS
Status: DISCONTINUED | OUTPATIENT
Start: 2021-02-03 | End: 2021-02-04

## 2021-02-03 RX ORDER — PROPOFOL 10 MG/ML
5-75 INJECTION, EMULSION INTRAVENOUS CONTINUOUS
Status: DISCONTINUED | OUTPATIENT
Start: 2021-02-03 | End: 2021-02-08 | Stop reason: CLARIF

## 2021-02-03 RX ORDER — MIDAZOLAM (PF) 1 MG/ML IN 0.9 % SODIUM CHLORIDE INTRAVENOUS SOLUTION
1-8 CONTINUOUS
Status: DISCONTINUED | OUTPATIENT
Start: 2021-02-03 | End: 2021-02-09

## 2021-02-03 RX ORDER — PROPOFOL 10 MG/ML
INJECTION, EMULSION INTRAVENOUS
Status: DISCONTINUED
Start: 2021-02-03 | End: 2021-02-03

## 2021-02-03 RX ORDER — NOREPINEPHRINE BITARTRATE 0.06 MG/ML
0.03-0.4 INJECTION, SOLUTION INTRAVENOUS CONTINUOUS
Status: DISCONTINUED | OUTPATIENT
Start: 2021-02-03 | End: 2021-02-08 | Stop reason: CLARIF

## 2021-02-03 RX ORDER — NOREPINEPHRINE BITARTRATE 0.06 MG/ML
INJECTION, SOLUTION INTRAVENOUS
Status: DISCONTINUED
Start: 2021-02-03 | End: 2021-02-03

## 2021-02-03 RX ORDER — LEVALBUTEROL INHALATION SOLUTION 1.25 MG/3ML
1.25 SOLUTION RESPIRATORY (INHALATION) EVERY 4 HOURS PRN
Status: DISCONTINUED | OUTPATIENT
Start: 2021-02-03 | End: 2021-02-11

## 2021-02-03 RX ORDER — POTASSIUM CHLORIDE 29.8 MG/ML
20 INJECTION INTRAVENOUS ONCE
Status: COMPLETED | OUTPATIENT
Start: 2021-02-03 | End: 2021-02-03

## 2021-02-03 RX ORDER — PROPOFOL 10 MG/ML
INJECTION, EMULSION INTRAVENOUS PRN
Status: DISCONTINUED | OUTPATIENT
Start: 2021-02-03 | End: 2021-02-03

## 2021-02-03 RX ORDER — PROPOFOL 10 MG/ML
5-75 INJECTION, EMULSION INTRAVENOUS CONTINUOUS
Status: DISCONTINUED | OUTPATIENT
Start: 2021-02-03 | End: 2021-02-03

## 2021-02-03 RX ORDER — FENTANYL CITRATE 50 UG/ML
25-50 INJECTION, SOLUTION INTRAMUSCULAR; INTRAVENOUS
Status: DISCONTINUED | OUTPATIENT
Start: 2021-02-03 | End: 2021-02-09

## 2021-02-03 RX ADMIN — PROPOFOL 20 MCG/KG/MIN: 10 INJECTION, EMULSION INTRAVENOUS at 02:14

## 2021-02-03 RX ADMIN — SODIUM CHLORIDE 300 MG: 9 INJECTION, SOLUTION INTRAVENOUS at 12:17

## 2021-02-03 RX ADMIN — NYSTATIN 1000000 UNITS: 500000 SUSPENSION ORAL at 20:01

## 2021-02-03 RX ADMIN — PROPOFOL 100 MG: 10 INJECTION, EMULSION INTRAVENOUS at 01:45

## 2021-02-03 RX ADMIN — TACROLIMUS 1 MG: 5 CAPSULE ORAL at 18:29

## 2021-02-03 RX ADMIN — Medication 40 MG: at 09:06

## 2021-02-03 RX ADMIN — PIPERACILLIN SODIUM AND TAZOBACTAM SODIUM 2.25 G: 2; .25 INJECTION, POWDER, LYOPHILIZED, FOR SOLUTION INTRAVENOUS at 08:36

## 2021-02-03 RX ADMIN — POTASSIUM CHLORIDE 20 MEQ: 29.8 INJECTION, SOLUTION INTRAVENOUS at 05:24

## 2021-02-03 RX ADMIN — Medication 5 ML: at 09:05

## 2021-02-03 RX ADMIN — MIDAZOLAM 2 MG: 1 INJECTION INTRAMUSCULAR; INTRAVENOUS at 08:30

## 2021-02-03 RX ADMIN — Medication 5 NG/KG/MIN: at 02:14

## 2021-02-03 RX ADMIN — HEPARIN SODIUM 5000 UNITS: 5000 INJECTION, SOLUTION INTRAVENOUS; SUBCUTANEOUS at 00:06

## 2021-02-03 RX ADMIN — Medication 2.4 UNITS/HR: at 17:12

## 2021-02-03 RX ADMIN — Medication 7 MG/HR: at 16:33

## 2021-02-03 RX ADMIN — Medication 6 MG/HR: at 17:12

## 2021-02-03 RX ADMIN — PREDNISONE 40 MG: 20 TABLET ORAL at 09:05

## 2021-02-03 RX ADMIN — LEVALBUTEROL HYDROCHLORIDE 1.25 MG: 1.25 SOLUTION RESPIRATORY (INHALATION) at 20:18

## 2021-02-03 RX ADMIN — PROPOFOL 30 MCG/KG/MIN: 10 INJECTION, EMULSION INTRAVENOUS at 12:29

## 2021-02-03 RX ADMIN — NYSTATIN 1000000 UNITS: 500000 SUSPENSION ORAL at 16:33

## 2021-02-03 RX ADMIN — SULFAMETHOXAZOLE AND TRIMETHOPRIM 295 MG: 200; 40 SUSPENSION ORAL at 09:06

## 2021-02-03 RX ADMIN — TACROLIMUS 1 MG: 5 CAPSULE ORAL at 09:11

## 2021-02-03 RX ADMIN — INSULIN ASPART 4 UNITS: 100 INJECTION, SOLUTION INTRAVENOUS; SUBCUTANEOUS at 19:58

## 2021-02-03 RX ADMIN — SULFAMETHOXAZOLE AND TRIMETHOPRIM 295 MG: 200; 40 SUSPENSION ORAL at 20:00

## 2021-02-03 RX ADMIN — BUDESONIDE 0.5 MG: 0.5 INHALANT ORAL at 07:47

## 2021-02-03 RX ADMIN — POTASSIUM CHLORIDE 20 MEQ: 29.8 INJECTION, SOLUTION INTRAVENOUS at 06:39

## 2021-02-03 RX ADMIN — INSULIN GLARGINE 10 UNITS: 100 INJECTION, SOLUTION SUBCUTANEOUS at 09:07

## 2021-02-03 RX ADMIN — LEVALBUTEROL HYDROCHLORIDE 1.25 MG: 1.25 SOLUTION RESPIRATORY (INHALATION) at 15:21

## 2021-02-03 RX ADMIN — PROPOFOL 30 MCG/KG/MIN: 10 INJECTION, EMULSION INTRAVENOUS at 20:17

## 2021-02-03 RX ADMIN — PROPOFOL 50 MCG/KG/MIN: 10 INJECTION, EMULSION INTRAVENOUS at 04:28

## 2021-02-03 RX ADMIN — Medication 0.05 MCG/KG/MIN: at 02:15

## 2021-02-03 RX ADMIN — INSULIN ASPART 3 UNITS: 100 INJECTION, SOLUTION INTRAVENOUS; SUBCUTANEOUS at 16:05

## 2021-02-03 RX ADMIN — LEVALBUTEROL HYDROCHLORIDE 1.25 MG: 1.25 SOLUTION RESPIRATORY (INHALATION) at 07:46

## 2021-02-03 RX ADMIN — LEVALBUTEROL HYDROCHLORIDE 1.25 MG: 1.25 SOLUTION RESPIRATORY (INHALATION) at 11:34

## 2021-02-03 RX ADMIN — ACETAMINOPHEN 325 MG: 325 TABLET, FILM COATED ORAL at 00:32

## 2021-02-03 RX ADMIN — FENTANYL CITRATE 50 MCG: 50 INJECTION, SOLUTION INTRAMUSCULAR; INTRAVENOUS at 10:42

## 2021-02-03 RX ADMIN — VANCOMYCIN HYDROCHLORIDE 1500 MG: 10 INJECTION, POWDER, LYOPHILIZED, FOR SOLUTION INTRAVENOUS at 09:18

## 2021-02-03 RX ADMIN — Medication 2 MG/HR: at 04:27

## 2021-02-03 RX ADMIN — INSULIN ASPART 2 UNITS: 100 INJECTION, SOLUTION INTRAVENOUS; SUBCUTANEOUS at 09:17

## 2021-02-03 RX ADMIN — LEVALBUTEROL HYDROCHLORIDE 1.25 MG: 1.25 SOLUTION RESPIRATORY (INHALATION) at 00:55

## 2021-02-03 RX ADMIN — PIPERACILLIN SODIUM AND TAZOBACTAM SODIUM 2.25 G: 2; .25 INJECTION, POWDER, LYOPHILIZED, FOR SOLUTION INTRAVENOUS at 20:00

## 2021-02-03 RX ADMIN — BUDESONIDE 0.5 MG: 0.5 INHALANT ORAL at 20:18

## 2021-02-03 RX ADMIN — NYSTATIN 1000000 UNITS: 500000 SUSPENSION ORAL at 08:58

## 2021-02-03 RX ADMIN — HEPARIN SODIUM 5000 UNITS: 5000 INJECTION, SOLUTION INTRAVENOUS; SUBCUTANEOUS at 16:33

## 2021-02-03 RX ADMIN — PROPOFOL 60 MCG/KG/MIN: 10 INJECTION, EMULSION INTRAVENOUS at 06:39

## 2021-02-03 RX ADMIN — Medication 2.4 UNITS/HR: at 16:33

## 2021-02-03 RX ADMIN — ROCURONIUM BROMIDE 100 MG: 10 INJECTION INTRAVENOUS at 01:45

## 2021-02-03 RX ADMIN — Medication 2.4 UNITS/HR: at 08:52

## 2021-02-03 RX ADMIN — INSULIN ASPART 4 UNITS: 100 INJECTION, SOLUTION INTRAVENOUS; SUBCUTANEOUS at 12:16

## 2021-02-03 RX ADMIN — INSULIN ASPART 1 UNITS: 100 INJECTION, SOLUTION INTRAVENOUS; SUBCUTANEOUS at 00:05

## 2021-02-03 RX ADMIN — Medication 20 NG/KG/MIN: at 22:56

## 2021-02-03 RX ADMIN — PIPERACILLIN SODIUM AND TAZOBACTAM SODIUM 2.25 G: 2; .25 INJECTION, POWDER, LYOPHILIZED, FOR SOLUTION INTRAVENOUS at 14:03

## 2021-02-03 RX ADMIN — NOREPINEPHRINE BITARTRATE 6.4 MCG: 1 INJECTION, SOLUTION, CONCENTRATE INTRAVENOUS at 01:45

## 2021-02-03 RX ADMIN — HEPARIN SODIUM 5000 UNITS: 5000 INJECTION, SOLUTION INTRAVENOUS; SUBCUTANEOUS at 08:58

## 2021-02-03 RX ADMIN — Medication 50 MCG/HR: at 08:56

## 2021-02-03 RX ADMIN — NYSTATIN 1000000 UNITS: 500000 SUSPENSION ORAL at 12:16

## 2021-02-03 ASSESSMENT — MIFFLIN-ST. JEOR
SCORE: 1135
SCORE: 1156

## 2021-02-03 ASSESSMENT — ACTIVITIES OF DAILY LIVING (ADL)
ADLS_ACUITY_SCORE: 16
ADLS_ACUITY_SCORE: 15
ADLS_ACUITY_SCORE: 16
ADLS_ACUITY_SCORE: 16
ADLS_ACUITY_SCORE: 15
ADLS_ACUITY_SCORE: 16

## 2021-02-03 NOTE — PROGRESS NOTES
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 Nephrology consutled for dialysis needs.     Interval History :   Mrs Blue was planned for HD today but became unstable needing re-intubation overnight, now proned and on norepi at 0.2 mcg/kg/hr.  No electrolyte issues pushing for run today, considered RRT for volume but we achieved 3.5L of UF with last run and has had minimal intake since then so more than likely her new pulm issues are infectious in etiology.  Will need RRT tomorrow, modality (HD vs CRRT) depending on hemodynamics at that time.       Assessment & Recommendations:   ESRD-Runs at [place] through [facility].  Has atypical HD schedule of Monday and Thursday, has AVF with partial graft which has been challenging to access per RN's.  Have avoided CRRT, running 3-4x/week to aggressively treat any pulm edema portion of resp failure but now re-intubated and proned.                 -Holding on run today as she is on pressor and proned, will need RRT tomorrow.  If still unstable we will consider catheter and CRRT.                 -Access is L arm AVF/graft, somewhat challenging access.       Volume status-Pulled 3.5L of UF with last run and overall has been net negative the past ~3 days as intake is minimal.  Holding on run today, will get CT once supine and will see how much pulm edema is impacting her current resp issues but more than likely this is an infectious process.  Wt is ~2kg above EDW but there is likely a difference between her bed wt and outpt standing wt's.        Electrolytes/pH-K 3.3, bicarb 25, holding on run  "today with hemodynamic instability.      Ca/phos/pth-Ca 9.0, Mg and Phos mildly up last check.      Anemia-Hgb 10.0, acute management per team.  Will check iron stores.       Nutrition-Nutren TF.       Seen and discussed with Dr Gamble     Recommendations were communicated to primary team via verbal communication.        ADRIÁN Lo CNS  Clinical Nurse Specialist  [phone]    Review of Systems:   I reviewed the following systems:  ROS not done due to vent/sedation.     Physical Exam:   I/O last 3 completed shifts:  In: 1228.49 [I.V.:168.49; NG/GT:360]  Out: -    BP 99/74   Pulse 106   Temp 98  F (36.7  C) (Axillary)   Resp 23   Ht 1.6 m (5' 2.99\")   Wt 58.6 kg (129 lb 3 oz)   LMP 06/07/2014 (Exact Date)   SpO2 95%   BMI 22.89 kg/m       GENERAL APPEARANCE: Re-intubated, proned.   EYES: no scleral icterus  Endo: no moon facies  Pulmonary: Intubated, proned, equal expansion.    CV: regular rhythm, normal rate - Edema present  GI: soft, non distended  MS: no evidence of inflammation in joints, no muscle tenderness  :  Basilio present  SKIN: warm, dry, +1 edema.    NEURO: intubated and sedated.      Labs:   All labs reviewed by me  Electrolytes/Renal -   Recent Labs   Lab Test 02/03/21  0415 02/02/21  0435 02/01/21  0424 01/31/21  0441 01/27/21  0414 01/27/21  0414    134 134 133   < > 135   POTASSIUM 3.3* 3.9 3.3* 3.1*   < > 3.8   CHLORIDE 98 100 99 97   < > 101   CO2 25 26 28 25   < > 23   BUN 57* 35* 55* 37*   < > 44*   CR 3.45* 2.63* 3.76* 3.02*   < > 3.18*   * 187* 184* 260*   < > 172*   CHELSEY 8.9 9.0 9.0 8.8   < > 8.4*   MAG 2.1 1.8  --  1.9   < > 2.2   PHOS 2.6  --   --  3.4  --  5.1*    < > = values in this interval not displayed.       CBC -   Recent Labs   Lab Test 02/03/21  0415 02/02/21  0435 02/01/21  0424   WBC 21.7* 10.5 10.5   HGB 10.0* 10.0* 10.5*    259 275       LFTs -   Recent Labs   Lab Test 01/29/21  0416 01/28/21  0412 01/27/21  0414   ALKPHOS 135 164* 171* "   BILITOTAL 0.5 0.6 0.8   ALT 21 26 30   AST 9 10 15   PROTTOTAL 5.3* 5.3* 6.0*   ALBUMIN 1.9* 1.8* 2.2*       Iron Panel -   Recent Labs   Lab Test 02/03/21  0415 12/13/18  1033 08/01/18  0921   IRON 51 16* 93   IRONSAT 36 7* 37   YOLA  --  302* 571*           Current Medications:    B and C vitamin Complex with folic acid  5 mL Oral or Feeding Tube Daily     budesonide  0.5 mg Nebulization BID     heparin ANTICOAGULANT  5,000 Units Subcutaneous Q8H     insulin aspart  1-12 Units Subcutaneous Q4H     insulin glargine  10 Units Subcutaneous QAM AC     levalbuterol  1.25 mg Nebulization 4x Daily     [Held by provider] metoprolol tartrate  50 mg Oral BID     nystatin  1,000,000 Units Mouth/Throat 4x Daily     pantoprazole  40 mg Oral QAM AC     piperacillin-tazobactam  2.25 g Intravenous Q6H     polyethylene glycol  17 g Oral or Feeding Tube Daily     posaconazole (NOXAFIL) intermittent infusion  300 mg Intravenous Daily     [Held by provider] predniSONE  2.5 mg Oral or Feeding Tube QPM     [START ON 2/7/2021] predniSONE  20 mg Oral Daily     predniSONE  40 mg Oral Daily     [Held by provider] predniSONE  5 mg Oral or Feeding Tube QAM     sulfamethoxazole-trimethoprim  295 mg Oral or Feeding Tube BID     tacrolimus  0.5 mg Oral or Feeding Tube QPM     tacrolimus  1 mg Oral or Feeding Tube QAM     valGANciclovir  450 mg Oral Once per day on Mon Thu     vancomycin (VANCOCIN) IV  1,500 mg Intravenous Once       dextrose       epoprostenol (VELETRI) 20 mcg/mL in sterile water inhalation solution 20 ng/kg/min (02/03/21 0853)     fentaNYL 50 mcg/hr (02/03/21 1000)     midazolam 7 mg/hr (02/03/21 1000)     norepinephrine 0.09 mcg/kg/min (02/03/21 1000)     propofol (DIPRIVAN) infusion 40 mcg/kg/min (02/03/21 1000)     sodium chloride 10 mL/hr at 02/03/21 0400     vasopressin 2.4 Units/hr (02/03/21 1000)

## 2021-02-03 NOTE — SIGNIFICANT EVENT
Significant Event Note    Patient with worsening hypoxia (SpO2 70s on 100% FiO2 & BIPAP, more tachypnea, and acknowledged subjective feeling of tiring out.    I spoke with her at length about re-intubation, potential need for prone ventilation, and ultimate tracheostomy.  She was amenable to all.    Uncomplicated intubation as previously charted; no hypotension or exacerbation of hypoxia (empirically started norepi drip at the time of intubation).    Flolan started with some improvement in SpO2, but still with P/F <150.  Brachial A line placed for serial ABG measurements and BP monitoring in anticipation of prone ventilation.    Post intubation, patient was lucid, awake, and nodding appropriately to questions & comments, and able to be compliant with ventilator.    She may tolerate prone ventilation without excessively deep sedation.    DARIELA Lopez MD  Clinical   Anesthesia / Critical Care  *[phone]

## 2021-02-03 NOTE — PLAN OF CARE
4C PT: Cancel and hold; pt currently intubated and prone, not appropriate for PT intervention this date. Will follow peripherally and re-initiate as indicated.

## 2021-02-03 NOTE — PLAN OF CARE
SLP: Per discussion with RN, pt intubated and not appropriate for swallow eval. Will complete orders.

## 2021-02-03 NOTE — ANESTHESIA PROCEDURE NOTES
Airway   Date/Time: 2/3/2021 1:48 AM   Patient location during procedure: ICU  Staff -   CRNA: Sandra Echavarria APRN CRNA  Performed By: CRNA    Consent for Airway   Urgency: emergentConsent: The procedure was performed in an emergent situation.    Report Obtained from Primary Care Team  History regarding most recent potassium obtained: Yes  History regarding presence/absence of renal failure obtained:Yes  History regarding stroke/CVA obtained:Yes  History regarding presence/absence of NM disorder:Yes    Indications and Patient Condition  Indications for airway management: airway protection and respiratory insufficiency  Mallampati: Not AssessedInduction type:intravenousMask difficulty assessment: 0 - not attempted    Final Airway Details  Final airway type: endotracheal airway  Successful airway:ETT - single  Endotracheal Airway Details   ETT size (mm): 7.0  Cuffed: yes  Successful intubation technique: video laryngoscopy  Grade View of Cords: 1 (cords open/clear)  Adjucts: stylet  Measured from: gums/teeth  Secured at (cm): 22  Secured with: commercial tube jordan  Bite block used: None    Post intubation assessment   ETT secured, Vent settings by primary/ICU team, Primary/ICU team to review CXR, No apparent complications and Sedation to be ordered by primary/ICU team  Placement verified by: capnometry, equal breath sounds, chest rise and x-ray   Number of attempts at approach: 1  Number of other approaches attempted: 0  Secured with:commercial tube jordan  Ease of procedure: easy  Dentition: Intact

## 2021-02-03 NOTE — PROGRESS NOTES
Pt intubated with 7.0 ET tube 22 @ teeth. Positive color change with colorimetric CO2 detector and bilateral breath sounds post intubation. Current vent settings: 24, 320, 100%, +8. RT will continue to follow.

## 2021-02-03 NOTE — PROGRESS NOTES
Care Management Initial Consult      Patient and spouse well known to me from follow up in the lung transplant program.  Patient admitted 1/24 with infection. Now critically ill.  On ventilator.  Proned.  Supportive visit with spouse this morning.  This afternoon sat with him to review  Patient's health care directive. Spouse understands gravity of patient condition and that she may not survive this infection.  Reviewing the health care directive AND from his previous discussions with  Her, he knows she would want everything done if she has a chance of recovery, but would not want her death prolonged if her illness is not survivable. Two of their 3 daughters are coming to [facility].  He will discuss with them if they wish to have a care conference to better understand patient situation.  Spouse is sad, but coping ok.  Excellent support from family.  Has confidence in the medical team.  He appreciates the straight forward and honest communication.      Further assessment below:      General Information  Assessment completed with: Spouse or significant other, Ranjan     Primary Care Provider verified and updated as needed:     Readmission within the last 30 days:           Advance Care Planning:  HCD on file.  Reviewed today with her healthcare agent.      Communication Assessment  Patient's communication style: spoken language (English or Bilingual)    Hearing Difficulty or Deaf: no   Wear Glasses or Blind: yes    Cognitive  Cognitive/Neuro/Behavioral: .WDL except  Level of Consciousness: sedated  Arousal Level: unresponsive  Orientation: other (see comments)(leodan)  Mood/Behavior: other (see comments)(leodan)  Best Language: (leodan)  Speech: endotracheal tube    Living Environment:   People in home: spouse  Ranjan  Current living Arrangements: house      Able to return to prior arrangements:         Family/Social Support:  Care provided by:    Provides care for:    Marital Status:   , Children  Ranjan        Description of Support System:           Current Resources:   Patient receiving home care services: No     Community Resources: (dialysis)  Equipment currently used at home: none  Supplies currently used at home:      Employment/Financial:  Employment Status: disabled        Financial Concerns: No concerns identified        Lifestyle & Psychosocial Needs:        Socioeconomic History     Marital status:      Spouse name: Not on file     Number of children: Not on file     Years of education: Not on file     Highest education level: Not on file     Tobacco Use     Smoking status: Never Smoker     Smokeless tobacco: Never Used   Substance and Sexual Activity     Alcohol use: No     Alcohol/week: 1.0 standard drinks     Types: 1 Glasses of wine per week     Drug use: No       Functional Status:  Prior to admission patient needed assistance:  Was driving self to dialysis.  Independent in cares, but fatigued.      Mental Health Status:  Mental Health Status: No Current Concerns       Chemical Dependency Status:  n/a                Values/Beliefs:  Spiritual, Cultural Beliefs, Christianity Practices, Values that affect care: no               Additional Information:  Will follow up with spouse in a.m.  He will let us know if children want care conference.      Redd Ruiz, Bridgton HospitalSW   Pager [phone]

## 2021-02-03 NOTE — PROCEDURES
Arterial Line:  Indications: hypoxia; intent to prone ventilate  Consent: verbal prior to intubation  Initial attempt at radial & femoral arterial lines by resident failed.  Right brachial chosen.  Chlorhexidine; sterile gown, gloves, mask, bouffant.  Vessel ID'd with ultrasound.  Arrow quickcath up, wire up, catheter over wire, securing device sutured.  Biopatch in place; sterile tegaderm.  No complications; good waveform & BP matched prior cuff BP.    I performed the entire procedure.  A timeout was performed with nursing prior to commensing.  DARIELA Lopez MD  Clinical   Anesthesia / Critical Care  *[phone]

## 2021-02-03 NOTE — PROGRESS NOTES
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 She was transferred to OSH ICU 1/22 and intubated and requiring vasopressors. 1/24 transferred to [facility] Medical ICU service for ongoing management, extubated on 1/29 to BiPAP.      CHANGES and MAJOR THINGS TODAY:   - intubated overnight  - broadened Abx: Vanc/Zosyn, and re-cultured, will obtain MRSA nares  - increased Flolan to 20  - Added Versed prn, Fentanyl gtt and prn  - Added Vaso gtt, consider stress dose steroids  - Plan to supine ~ 11 pm, will get CT chest at that point if stable  - will talk to renal about possible CRRT vs attempting to wean pressors for HD  - will talk to pulm transplant  - family declined care conference today    PLAN:     Neuro:  # Pain and sedation  - Continue propofol and versed ggts, will add fentanyl ggt, attempt to wean prop as tolerated  - Fentanyl and versed PRNs ordered    #Insomnia  - Melatonin     #Unequal pupils  Patient with alternating fixed and dilated pupils. CTH unremarkable. Neuro exam otherwise unremarkable  - Optho consulted, no acute concerns     Pulmonary:  #Acute Hypoxic Respiratory Failure,  Suspected 2/2 PJP initially. OSH CT 1/23 + bilateral ground glass opacities and consolidative lung infiltrates centrally distributed to the upper lobes and RLL, . Covid-19 negative X3 at OSH. Given acute and severe decompensation, concern for secondary pneumonia.  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  - Positive PJP PCR on 1/28 with positive fungitel   - Continue PTA tacrolimus  - Prednisone taper, holding home dose for now, may consider stress dosing steroids  - Pulmonary lung transplant team consulted and following, appreciate recs     Cardiovascular:  #Shock  #Hx Hypertension  Patient ith hypotension likely related to septic shock with some contribution of propofol. Last ECHO 10/21/20 with EF 60-65%, normal LV & RV function.   - A-line in place  - Continue levo ggt, will add vaso ggt, consider stress dose steroids in the afternoon  - Hold PTA coreg and amlodipine     # Atrial fibrillation w/ RVR  Patient with pAF with rates into 130-40s. Likely in the setting of acute pulmoary illness. HR now < 100 following intubation  - Discontinue Metoprolol      GI/Nutrition:  # Portal HTN  Liver biopsy positive for congestive hepatopathy. Previous had ascites thought to be r/t elevated right sided heart pressures. Last saw GI on 12/21/20 and patient endorsed that her ascites is no longer present.   - NTD     # Nutrition  - NJT placed by KATHERINE laws goal rate of 40    # Constipation  Patient reportedly has history of constipation with bactrim use, now stooling  - Scheduled miralax ordered  - PRN dulcolax and senna-doc ordered     Renal/Fluids/Electrolytes:  # ESRD on iHD  twice weekly (M/Th).   # Anion gap metabolic acidosis r/t ESRD, uremia- Resolved  Outpatient dry body weight 56.5 kg. Ran dialysis 1/26, 1/28, 1/30  - Nephrology Consulted   - Continued dialysis per neph recs, will discuss CRRT vs attempting to wean pressors prior to HD  - I&O  - Daily Weights   - Trend BMP     # Hypokalemia  - Dialysis as above     Endocrine:  # Seronegative RA with Raynaud's   - NTD, monitor   # Hyperglycemia, steroid induced  - High intensity sliding scale insulin  - 10 units glargine daily     ID:  # Sepsis with Shock  Suspected related to PCP pneumonia. OSH CT 1/23 + bilateral ground glass opacities and consolidative lung infiltrates centrally distributed to the upper lobes and RLL. Covid-19 negative X3 at OSH. Procalcitonin negative. Urine strep, CMV, and RPP negative. Given acute and severe decompensation, concern for secondary pneumonia.  - PJP PCR positive, fungitell positive as well  - Continue treatment with bactrim, prednisone taper  - Will repeat BCx x 2 and broaden abx to add vanc and zosyn, MRSA nares ordered, plan for CT as above, will plan for bronchoscopy as able   - s/p thora 1/25, fluid exudative, NGTD  - s/p 7 day course of empiric ceftriaxone     # Thrush  -nystatin swish and spit     # Chronic/Stable right aspergillus empyema   - Continue PTA posaconazole, will monitor QTc     Hematology:    # Anemia r/t renal disease, chronic stable   # Thrombocytopenia r/t critical illness, resolved   # Leukocytosis, infection/steroids  Takes aranesp 25 mcg every four weeks, last dose 01/14.    - Daily CBC     Musculoskeletal:  #Weakness/Deconditioning of critical illness  - PT/OT consulted     Skin:  # Dermatomyositis antitryptase syndrome   - Noted, NTD     General Cares/Prophylaxis:    DVT Prophylaxis: Heparin SQ  GI Prophylaxis: PPI  Restraints: Not indicated  Family Communication:  updated at bedside  Code Status: Full      Lines/tubes/drains:  - HD fistula left forearm  - CVC  RIJ  - PIV  - A-line  - NJT  - Rectal pouch  - ETT     Disposition:  - Medical ICU    Patient seen and findings/plan discussed with medical ICU staff, Dr. James.    Tomas Rolle     ====================================  INTERVAL HISTORY:   Overnight patient was noted to have worsening hypoxia leading to reintubation. CXR with worsening of consolidations. She was started on appropriate sedation as well as pressors for hypotension. Patient is intubated, sedated, and proned.     OBJECTIVE:   1. VITAL SIGNS:   Temp:  [98  F (36.7  C)-99.1  F (37.3  C)] 98  F (36.7  C)  Pulse:  [] 109  Resp:  [23-74] 24  BP: ()/() 99/74  MAP:  [66 mmHg-117 mmHg] 80 mmHg  Arterial Line BP: ()/(44-91) 108/55  FiO2 (%):  [80 %-100 %] 80 %  SpO2:  [79 %-97 %] 95 %  Ventilation Mode: CMV/AC  (Continuous Mandatory Ventilation/ Assist Control)  FiO2 (%): 80 %  Rate Set (breaths/minute): 24 breaths/min  Tidal Volume Set (mL): 320 mL  PEEP (cm H2O): 8 cmH2O  Oxygen Concentration (%): 80 %  Resp: 24    2. INTAKE/ OUTPUT:   I/O last 3 completed shifts:  In: 1228.49 [I.V.:168.49; NG/GT:360]  Out: -     3. PHYSICAL EXAMINATION:  General: Laying in bed. No acute distress. Intubated, sedated, proned.  Neuro: Sedated, no notable clonus  Pulm/Resp: Worsening diffuse coarse breath sounds  CV: Pulses intact, RRR  Abdomen: Proned  Incisions/Skin: Left AV fistula with thrill and bruit    4. LABS:   Arterial Blood Gases   Recent Labs   Lab 02/03/21  0602 02/03/21  0410 02/03/21  0112 01/29/21  1547   PH 7.44 7.42 7.37 7.46*   PCO2 36 39 45 41   PO2 165* 61* 49* 56*   HCO3 25 25 26 29*     Complete Blood Count   Recent Labs   Lab 02/03/21  0415 02/02/21  0435 02/01/21  0424 01/31/21  0441   WBC 21.7* 10.5 10.5 11.8*   HGB 10.0* 10.0* 10.5* 11.3*    259 275 276     Basic Metabolic Panel  Recent Labs   Lab 02/03/21  0415 02/02/21  0435 02/01/21  0424 01/31/21  0441    134 134 133   POTASSIUM 3.3* 3.9 3.3* 3.1*   CHLORIDE  98 100 99 97   CO2 25 26 28 25   BUN 57* 35* 55* 37*   CR 3.45* 2.63* 3.76* 3.02*   * 187* 184* 260*     Liver Function Tests  Recent Labs   Lab 01/29/21  0416 01/28/21  0412   AST 9 10   ALT 21 26   ALKPHOS 135 164*   BILITOTAL 0.5 0.6   ALBUMIN 1.9* 1.8*     Coagulation Profile  No lab results found in last 7 days.    5. RADIOLOGY:   Recent Results (from the past 24 hour(s))   XR Feeding Tube Placement    Narrative    Feeding tube placement, 2/2/2021.    Comparison: none.    History: Feeding tube needed for nutrition.     Fluoroscopy time:  1.6 minute[s]    Technique: After injection of lidocaine gel into the left nostril,  previously placed feeding tube was advanced under fluoroscopic  guidance. Guidewire was reinserted under fluoroscopy. Patient was  given single dose of 10 mg IV Reglan, previously ordered as PRN  medication from primary team for feeding tube placement.    Findings: The feeding tube is advanced with the tip in the expected  location of the distal (fourth) portion of the duodenum. A small  amount of barium and water was injected to define anatomy. Guidewire  was retracted stored on patient white board. Feeding tube was flushed  thoroughly with water. Feeding tube was secured with previously placed  nasal bridle.      Impression    Impression: Uncomplicated feeding tube placement with tip in the  expected location of the distal (fourth) portion of the duodenum.    I, SUNITA ADKINS MD, attest that I was immediately available to  provide guidance and assistance during the entirety of the procedure.  The examination was performed portable in the ICU therefore a  radiologist was not directly present during tube placement.    I have personally reviewed the examination and initial interpretation  and I agree with the findings.    SUNITA ADKINS MD   XR Chest Port 1 View    Narrative    EXAM: XR CHEST PORT 1 VW  2/3/2021 1:30 AM     HISTORY:  worsening hypoxic respiratory failure        COMPARISON:  Chest x-ray 2/1/2021.    FINDINGS:   Portable upright radiograph of the chest. Right internal jugular  central venous catheter projects over the superior cavoatrial  junction. Feeding tube courses below the diaphragm and into the  stomach with the tip collimated out of the field of view. Postsurgical  changes of bilateral lung transplant with mediastinal clips and intact  median sternotomy wires. Trachea is midline. Cardiac silhouette is  largely obscured. No significant change in the diffuse mixed  interstitial and airspace opacities, most pronounced in the bilateral  lower lobes. Likely bilateral pleural effusions. No pneumothorax.    There is some contrast noted within the colon in the upper abdomen,  presumably from feeding tube placement 2/2/2021.      Impression    IMPRESSION:   1. Unchanged diffuse bilateral interstitial and airspace opacities,  with lower lung predominance.  2. Probable bilateral pleural effusions.    I have personally reviewed the examination and initial interpretation  and I agree with the findings.    NITHYA GUIDO MD   XR Chest Port 1 View    Narrative    EXAM: XR CHEST PORT 1 VW  2/3/2021 2:14 AM     HISTORY:  s/p intubation       COMPARISON:  Chest x-ray from same date at 0124 hours    FINDINGS:   Portable supine radiograph of the chest. Endotracheal tube tip  projects over the low thoracic trachea approximately 2.4 cm above the  level of the gee. Right internal jugular central venous catheter  projects over the superior cavoatrial junction. Feeding tube courses  below the diaphragm and into the stomach with the tip collimated out  of the field of view. Postsurgical changes of bilateral lung  transplant with mediastinal clips and intact median sternotomy wires.  Trachea is midline. Cardiac silhouette is largely obscured. No  significant change in the diffuse mixed interstitial and airspace  opacities, most pronounced in the bilateral lower lobes.  Likely  bilateral pleural effusions. No pneumothorax.      Impression    IMPRESSION:   1. Endotracheal tube tip projects over the low thoracic trachea,  approximately 2.4 cm above the level of the gee.  2. Unchanged diffuse mixed interstitial and airspace opacities.    I have personally reviewed the examination and initial interpretation  and I agree with the findings.    NITHYA GUIDO MD   XR Abdomen Port 1 View    Narrative    EXAM: XR ABDOMEN PORT 1 VW  2/3/2021 2:30 AM     HISTORY:  s/p intubation for placement of NJ       COMPARISON:  Abdominal radiograph 1/26/2021    FINDINGS:   Portable semiupright view of the abdomen. Feeding tube tip projects at  the expected location of the duodenal jejunal junction. Partial  contrast opacification of loops of bowel in the abdomen from prior  feeding tube placement on 2/2/2021. Nonobstructive bowel gas pattern.  No evidence of pneumatosis. Diffuse interstitial opacities in the  visualized lung bases.    Pression:  1. Feeding tube tip projects over the duodenal jejunal junction.  2. Nonobstructive bowel gas pattern.    I have personally reviewed the examination and initial interpretation  and I agree with the findings.    NITHYA GUIDO MD

## 2021-02-03 NOTE — PLAN OF CARE
Major Shift Events:  patient intubated at 0200 for increase the work of breathing and acute hypoxic respiratory failure . . Proned at 0500 . Sedated on Propofol and versed . R Pupil > L pupil . Hemodynamically stable. On Levo gtt to keep MAP>65 . Afebrile. Tolerated vent setting 80%/320/24 /8 .  Started on Flolan neb gtt via the vent . ABG improved after 1 hour prone position . Breath sound Coarse .  Needed frequent oral and ETT suctioning . Tolerated TF through ND @ 40 mls/hr . No BM during the shift. Anuric . Potassium 3.3 replaced with 20 meq iv x2 .  Plan: continue to monitor patient conditions closely . Possible CRRT vs HD .   For vital signs and complete assessments, please see documentation flowsheets.     Problem: Respiratory Compromise (Pneumonia)  Goal: Effective Oxygenation and Ventilation  Outcome: Declining  Intervention: Promote Airway Secretion Clearance  Intervention: Optimize Oxygenation and Ventilation  Recent Flowsheet Documentation  Taken 2/3/2021 0600 by Meredith Toney, RN  Head of Bed (HOB) Positioning: HOB not elevated per patient request

## 2021-02-03 NOTE — PLAN OF CARE
ICU End of Shift Summary. See flowsheets for vital signs and detailed assessment.    Changes this shift: Patient was struggling with low SpO2's in the 82-84% range on HFNC of 100% after her feeding tube placement so was placed on bipap for about 2 hours. She rested well and then got up in a chair and worked with PT while on the bipap still and reported feeling well. She was placed back on HFNC at 100% and 60 LPM mid- afternoon and has tolerated well for the rest of the shift with SpO2's of 89-91%.     Plan: Continue with current plan of care.

## 2021-02-03 NOTE — PLAN OF CARE
4C OT Cancel; pt currently intubated and prone, not appropriate for OT intervention this date. Will resume intervention as indicated.

## 2021-02-03 NOTE — PROVIDER NOTIFICATION
Notified MICU team about increase the work of breathing , using the accessory muscle . Patient anxious . Restless , irritable. Reported that she can't get comfortable . RR 30's-40's . On BIPAP FIO2 100% saturation in lower 80's . Spoke to Dr Carter .Len . Stat ABG . CXR and neb issued .

## 2021-02-03 NOTE — PROGRESS NOTES
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  Patient was admitted to OSH on  for acute hypoxemic respiratory failure and new lung opacities. Intubated  and transferred to [facility] for ongoing management.  Extubated  but reintubated - for progressive hypoxemia. Hypoxia persists and has remained on high O2 requirements (HFNC during the day, and BiPAP while sleeping). Atrial fibrillation  and started on metoprolol. Rapid decompensation and ARDS 2/3 early AM requiring reintubation and prone positioning and inhaled velitri, significant rise in leukocytosis c/f worsening PJP vs secondary infection.      Recommendations:  - Continue Bactrim for PJP and Steno coverage, agree with Zosyn/vanco broad coverage given decompensation  - Follow pending blood cultures, Sputum culture/gram stain ordered for you  - Agree with rechecking COVID-19 PCR and MRSA swab  - Repeat imaging per MICU, agree with repeat CT chest this evening if tolerates supine  - Steroid burst and taper per MICU, resume chronic prednisone upon burst/taper completion  - Tacrolimus level today subtherapeutic, dose increased, recheck level on  (ordered)  - Please let our team know if planning to start diltiazem given known interaction with tacrolimus  - Tentatively planning for care conference in 1-2 days for medical update, pending family availability     Acute hypoxemic respiratory failure:   Right  post-obstructive Stenotrophomonas and Eikenella pneumonia:  PJP pneumonia:  Septic shock:   ARDS: Presented to OSH ED with increased SOB and hypoxia, initially requiring 4L NC but continued to decline and subsequently intubated 1/24. Hemodynamic instability after intubation requiring pressors, transferred to [facility]. Afebrile but with leukocytosis. COVID and respiratory panel negative. PTA bronch (12/23/20) with moderate airway obstruction and RML/RLL mucous plugging, cultures with Stenotrophomonas and Eikenella (IV ceftazidime 1/13-1/19). OSH CT chest with new multifocal GGO bilaterally and increased left loculated pleural effusion. Repeat bronch (1/24) with RUL BAL with thick copious secretions to RML/RLL. + PJP PCR from BAL (1/24). S/p thoracentsis (1/25)), cultures NGTD. BD glucan slightly elevated at 261 (from 106), but within her recent range. Extubated to 4L on 1/26. Reintubated 1/27-1/29 for progressive hypoxemia. Most recent CXR (2/1) for interval follow up with slightly improved extensive bilateral mixed interstitial and airspace opacities with lower lung predominance  Hypoxia persisted and had remained on high O2 requirements (HFNC % FiO2/60LPM and BiPAP 12/6 80%FiO2 overnight). Worsening hypoxia (70-80%SpO2) on BiPAP early 2/3 and patient reintubated. Repeat ABGs with P/F ratio significant for ARDS so patient prone positioned and inhaled Veletri initiated at 0500, became hypotensive requiring pressors. Since then FiO2 has been able to wean to 70%. Significant rise in leukocytosis c/f worsening PJP vs secondary infection, remains afebrile.  - Blood cultures and fungal blood cultures (1/24) NGTD, repeat blood cultures (2/3), pending   - BAL cultures (1/24) with PJP PCR and BDG fungitell positive at 261 (1/28/21) and increased from prior 106 (9/9/20)  - Sputum culture/gram stain (2/3) pending  - Procalcitonin ordered  - Agree with rechecking COVID-19 PCR and MRSA swab  - ABX per MICU: Continue Bactrim  for PJP and Steno coverage. Agree with Zosyn/vanco for broad coverage given decompensation, pending cultures. (S/p ceftriaxone 1/29-2/1 and Zosyn 1/24-1/29 for Eikenella coverage)  - Stress dose steroids and taper per MICU  - Repeat imaging per MICU, agree with repeat CT chest this evening if tolerates supine  - CXR with increased diffuse mixed interstitial and airspace opacities bibasilar predominant, as compared to 2/1 (personally reviewed with Dr. James)  - Pulmonary toilet with chest physiotherapy QID and nebs: Xopenex QID (2/2, s/p PTA Duoneb), Mucomyst QID, and Pulmicort BID  - Ventilator and ARDS management per MICU  - Tentatively planning for care conference in 1-2 days for medical update, pending family availability     Aspergillus empyema (left-sided): First noted 10/8/19. CT scan on 7/17/20 with increased mass-like density, likely pleural-based, in LLL area s/p needle aspiration (8/13/20) with Aspergillus fumigatus on cultures s/p intrapleural amphotericin bead placement (11/20).  Following with ID as OP.  LFTs stable on admission (ALP chronically mildly elevated). CT chest with increased loculated left pleural effusion s/p thoracentesis (1/25), exudative with 87% neutrophils, very high LDL (6308), cytology normal. Cultures NGTD.  - Pleural cultures (1/25) NGTD  - PTA posaconazole, indefinite course; level 1/26 therapeutic, QTc and LFT monitoring per primary     S/p bilateral lung transplant for ILD 2/2 CTD:   Right mainstem bronchial stenosis (s/p dilation 3/2019): Last seen in pulmonary clinic 12/2. DSA (1/25) not detected. Continued right mainstem stenosis noted with PTA bronch on 12/23/20, mild on 1/24 bronch.  - Follow up with IP as OP for evaluation of right bronchial stenosis     Immunosuppression: On 2 drug IST d/t recurrent infections  - Tacrolimus level today 2/3 subtherapeutic at 5.4 (suspect d/t changing from NG to post pyloric and decreased transit time). Goal level 8-10. Will increase from 1  mg qAM / 0.5 mg qPM to 1 mg BID. Recheck level 2/6 (ordered)  - Holding chronic prednisone while on stress dose steroids (5mg qAM and 2.5mg qPM)     Prophylaxis:   - Bactrim treatment dose as above. PTA had not been on PJP ppx since 7/28/20 as CD4 > 200.      H/o CMV viremia: CMV D+/R+.  CMV <137 (1/25).  - VGCV for CMV ppx with stress dose steroids (1/26)      EBV viremia: Level 12/9/20 mildly elevated to 10K (log 4) from prior 3K (3.5 log) on 9/9/20, repeat (1/25/21) decreased to 5619 copies (log of 3.8).  - Recheck in 1 month-> 2/22     Oropharyngeal candidiasis: White tongue plaque noted 2/1.  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 Chronic iHD M/Th via AVF with partial graft.   - Monitoring of tacrolimus as above  - Dialysis management per nephrology (M/Th iHD)     Atrial fibrillation w/ RVR: H/o paroxysmal AF, last 10/2019. Recurrence 1/30 with RVR (-140s). Likely in the setting of acute pulmonary illness.  - Management per MICU. Metoprolol (1/30, increased 1/31 and 2/1)  - Please let our team know if planning to start diltiazem given known interaction with tacrolimus     We appreciate the excellent care provided by the MICU team. Recommendations communicated via this note. Will continue to follow along closely, please do not hesitate to call with any questions or concerns.     Patient discussed with Dr. Erika Ibarra, APRN, CNP   Inpatient Nurse Practitioner  Pulmonary CF/Transplant     Subjective & Interval History:     Yesterday had remained on HFNC 100% FiO2 and 60 LPM during the day, and transitioned to BiPAP overnight at 10/5 and 80%FiO2 through the night hypoxia worsened ->100%FiO2 and SpO2 dropped to 70-80%. ABG 7.37/45/49/29 and patient was re-intubated. Post intubation ABG with persistent ARDS as P/F ratio 165 so patient prone positioned and inhaled Veletri started at 0500. Became hypotensive requiring pressors. Since then FiO2 has been able to wean to 70% and  "maintaining SpO2 >95%. Plateau pressure 31. Settings AC 24/320/8/70. Nursing suctioning intermittently with tan/pink sputum. Blood and sputum cultures sent, and started on broad abx. WBC doubled since yesterday -> 21.7.  Continues in Afib with RVR continues with rates 100-110, better controlled over recent days. TF increased to goal rate yesterday after feeding tube advanced to post pyloric by radiology. Stool x1 yesterday.     Review of Systems:     ROS as above, otherwise limited due to intubation and sedation    Physical Exam:     Vital signs:  Temp: 98  F (36.7  C) Temp src: Axillary BP: 99/74 Pulse: 91   Resp: 24 SpO2: 95 % O2 Device: Mechanical Ventilator Oxygen Delivery: 60 LPM Height: 160 cm (5' 2.99\") Weight: 58.6 kg (129 lb 3 oz)  I/O:     Intake/Output Summary (Last 24 hours) at 2/3/2021 1017  Last data filed at 2/3/2021 1000  Gross per 24 hour   Intake 1934.37 ml   Output --   Net 1934.37 ml     Constitutional: lying in bed, prone position, sedated and in no apparent distress.   HEENT: Orally intubated  PULM: Fair airflow bilaterally. Coarse crackles t/o, no rhonchi, no wheezes. On full ventilator support. No ventilator dyssynchrony.  CV: Normal S1 and S2. irregular. No murmur, gallop, or rub. No peripheral edema.   ABD: unable to assess d/t prone position   MSK:  No apparent muscle wasting.   NEURO: Sedated   SKIN: Warm, dry. No rash on limited exam.   PSYCH: Calm      Lines, Drains, and Devices:  Peripheral IV 02/03/21 Anterior;Right Upper forearm (Active)   Number of days: 0       Arterial Line 02/03/21 Brachial (Active)   Site Assessment WDL 02/03/21 0400   Line Status Pulsatile blood flow 02/03/21 0400   Arterine Line Cap Change Due 02/05/21 02/03/21 0400   Art Line Waveform Appropriate 02/03/21 0400   Art Line Interventions Zeroed and calibrated;Leveled;Connections checked and tightened 02/03/21 0400   Color/Movement/Sensation Capillary refill less than 3 sec 02/03/21 0400   Line Necessity Yes, " meets criteria 02/03/21 0400   Dressing Type Transparent 02/03/21 0400   Dressing Status Clean, dry, intact;Clean;Dry 02/03/21 0400   Dressing Change Due 02/07/21 02/03/21 0400   Number of days: 0       CVC Double Lumen 10/25/19 Right Internal jugular (Active)   Number of days: 467       Hemodialysis Vascular Access Arteriovenous fistula Left Arm (Active)   Site Assessment Bruit present;Thrill present 02/03/21 0400   Cannulation Needle Size 15 02/01/21 1133   Dressing Intervention Dressing changed 02/01/21 1600   Dressing Status Clean, dry, intact 02/03/21 0400   Verification by x-ray Not applicable 01/28/21 1520   Hand Off Report Yes 01/30/21 1250   Number of days: 9       CVC TRIPLE LUMEN Right Internal jugular (Active)   Site Assessment WDL 02/03/21 0400   Dressing Type Chlorhexidine sponge 02/03/21 0400   Dressing Status clean;dry;intact 02/03/21 0400   Dressing Intervention dressing changed 01/31/21 0400   Dressing Change Due 02/07/21 02/03/21 0400   Line Necessity yes, meets criteria 02/03/21 0400   Blue - Status infusing 02/03/21 0400   Blue - Cap Change Due 02/05/21 02/03/21 0400   Brown - Status infusing 02/03/21 0400   Brown - Cap Change Due 02/05/21 02/03/21 0400   White - Status infusing 02/03/21 0400   White - Cap Change Due 02/05/21 02/03/21 0400   Phlebitis Scale 0-->no symptoms 02/03/21 0400   Infiltration? no 02/03/21 0400   Infiltration Scale 0 02/03/21 0400   Infiltration Site Treatment Method  None 02/03/21 0400   CVC Comment c/d/i 02/03/21 0400   Number of days: 10     Data:     LABS    CMP:   Recent Labs   Lab 02/03/21  0415 02/02/21  0435 02/01/21  0424 01/31/21  0441 01/29/21  2321 01/29/21  2321 01/29/21  0416 01/28/21  0412    134 134 133   < >  --  137 136   POTASSIUM 3.3* 3.9 3.3* 3.1*   < > 3.3* 3.4 3.7   CHLORIDE 98 100 99 97   < >  --  100 103   CO2 25 26 28 25   < >  --  26 22   ANIONGAP 10 8 7 11   < >  --  11 11   * 187* 184* 260*   < >  --  164* 182*   BUN 57* 35* 55*  37*   < >  --  28 53*   CR 3.45* 2.63* 3.76* 3.02*   < >  --  2.76* 3.77*   GFRESTIMATED 14* 19* 13* 16*   < >  --  18* 12*   GFRESTBLACK 16* 22* 14* 19*   < >  --  21* 14*   CHELSEY 8.9 9.0 9.0 8.8   < >  --  8.4* 7.9*   MAG 2.1 1.8  --  1.9  --  1.9  --   --    PHOS 2.6  --   --  3.4  --   --   --   --    PROTTOTAL  --   --   --   --   --   --  5.3* 5.3*   ALBUMIN  --   --   --   --   --   --  1.9* 1.8*   BILITOTAL  --   --   --   --   --   --  0.5 0.6   ALKPHOS  --   --   --   --   --   --  135 164*   AST  --   --   --   --   --   --  9 10   ALT  --   --   --   --   --   --  21 26    < > = values in this interval not displayed.     CBC:   Recent Labs   Lab 02/03/21 0415 02/02/21 0435 02/01/21 0424 01/31/21  0441   WBC 21.7* 10.5 10.5 11.8*   RBC 3.45* 3.43* 3.52* 3.77*   HGB 10.0* 10.0* 10.5* 11.3*   HCT 31.3* 31.4* 31.8* 34.6*   MCV 91 92 90 92   MCH 29.0 29.2 29.8 30.0   MCHC 31.9 31.8 33.0 32.7   RDW 15.3* 15.1* 14.9 14.9    259 275 276       INR: No lab results found in last 7 days.    Glucose:   Recent Labs   Lab 02/03/21  0909 02/03/21 0415 02/03/21  0306 02/03/21  0004 02/02/21 2024 02/02/21  1704 02/02/21  1232 02/02/21  0435 02/02/21 0435 02/01/21  0424 02/01/21 0424 01/31/21 0441 01/31/21 0441 01/30/21 0417 01/30/21 0417 01/29/21 0416 01/29/21 0416   GLC  --  138*  --   --   --   --   --   --  187*  --  184*  --  260*  --  152*  --  164*   *  --  93 154* 192* 195* 147*   < >  --    < >  --    < >  --    < >  --    < >  --     < > = values in this interval not displayed.       Blood Gas:   Recent Labs   Lab 02/03/21  0602 02/03/21 0410 02/03/21  0112 01/31/21 0441 01/29/21 0416 01/29/21 0416 01/28/21 0404   PHV  --   --   --  7.43  --  7.54* 7.36   PCO2V  --   --   --  41  --  35* 44   PO2V  --   --   --  50*  --  37 44   HCO3V  --   --   --  27  --  30* 25   LASHAUN  --   --   --  2.6  --  6.8  --    O2PER 100.0 100.0 100% 70.0   < > 50.0 50.0    < > = values in this interval not  displayed.       Culture Data   Recent Labs   Lab 02/03/21  0900 02/03/21  0816   CULT PENDING PENDING       Virology Data:   Lab Results   Component Value Date    FLUAH1 Not Detected 01/24/2021    FLUAH3 Not Detected 01/24/2021    [id number] Not Detected 01/24/2021    IFLUB Not Detected 01/24/2021    RSVA Not Detected 01/24/2021    RSVB Not Detected 01/24/2021    PIV1 Not Detected 01/24/2021    PIV2 Not Detected 01/24/2021    PIV3 Not Detected 01/24/2021    HMPV Not Detected 01/24/2021    HRVS Negative 01/24/2021    ADVBE Negative 01/24/2021    ADVC Negative 01/24/2021    ADVC Negative 12/23/2020    ADVC Negative 10/07/2019       Historical CMV results (last 3 of prior testing):  Lab Results   Component Value Date    CMVQNT <137 (A) 01/25/2021    CMVQNT 238 (A) 01/24/2021    CMVQNT 675 (A) 12/23/2020     Lab Results   Component Value Date    CMVLOG <2.1 01/25/2021    CMVLOG 2.4 (H) 01/24/2021    CMVLOG 2.8 (H) 12/23/2020       Urine Studies    Recent Labs   Lab Test 01/24/21  1729 10/21/19  2240   URINEPH 5.0 5.0   NITRITE Negative Negative   LEUKEST Moderate* Large*   WBCU 34* 115*       Most Recent Breeze Pulmonary Function Testing (FVC/FEV1 only)  FVC-Pre   Date Value Ref Range Status   12/09/2020 1.12 L    09/09/2020 1.56 L    03/09/2020 1.57 L    02/19/2020 1.78 L      FVC-%Pred-Pre   Date Value Ref Range Status   12/09/2020 34 %    09/09/2020 47 %    03/09/2020 48 %    02/19/2020 54 %      FEV1-Pre   Date Value Ref Range Status   12/09/2020 0.98 L    09/09/2020 1.10 L    03/09/2020 0.96 L    02/19/2020 0.99 L      FEV1-%Pred-Pre   Date Value Ref Range Status   12/09/2020 37 %    09/09/2020 42 %    03/09/2020 37 %    02/19/2020 38 %        IMAGING    Recent Results (from the past 48 hour(s))   XR Chest Port 1 View    Narrative    Exam: XR CHEST PORT 1 VW, 2/1/2021 11:30 AM    Indication: dyspnea, s/p lung transplant    Comparison: 1/29/2021    Findings:   Semiupright AP view of the chest. Patient is rotated to the  right.  Posterior changes of bilateral lung transplantation with mediastinal  clips and intact sternotomy wires. Right IJ central line projecting  over the low SVC. Enteric tube coursing below the left hemidiaphragm  with tip projecting over the region of the stomach. Enteric contrast  is no longer visualized.  Trachea is midline. Cardiac silhouette is obscured. Redemonstration of  extensive bilateral mixed interstitial and airspace opacities, with  lower lobe predominance. Unchanged aeration of the bilateral upper  lungs. Bilateral dense retrocardiac and basilar opacities. No  appreciable pneumothorax. Probable small bilateral pleural effusions.       Impression    Impression:   1. Right IJ central venous catheter and enteric tube are stable in  position.  2. Relatively unchanged extensive bilateral mixed interstitial and  airspace opacities with lower lung predominance. Probable superimposed  bilateral small pleural effusions.    I have personally reviewed the examination and initial interpretation  and I agree with the findings.    ANA CRISOSTOMO, DO   XR Feeding Tube Placement    Narrative    Feeding tube placement, 2/2/2021.    Comparison: none.    History: Feeding tube needed for nutrition.     Fluoroscopy time:  1.6 minute[s]    Technique: After injection of lidocaine gel into the left nostril,  previously placed feeding tube was advanced under fluoroscopic  guidance. Guidewire was reinserted under fluoroscopy. Patient was  given single dose of 10 mg IV Reglan, previously ordered as PRN  medication from primary team for feeding tube placement.    Findings: The feeding tube is advanced with the tip in the expected  location of the distal (fourth) portion of the duodenum. A small  amount of barium and water was injected to define anatomy. Guidewire  was retracted stored on patient white board. Feeding tube was flushed  thoroughly with water. Feeding tube was secured with previously placed  nasal bridle.       Impression    Impression: Uncomplicated feeding tube placement with tip in the  expected location of the distal (fourth) portion of the duodenum.    I, SUNITA ADKINS MD, attest that I was immediately available to  provide guidance and assistance during the entirety of the procedure.  The examination was performed portable in the ICU therefore a  radiologist was not directly present during tube placement.    I have personally reviewed the examination and initial interpretation  and I agree with the findings.    SUNITA ADKINS MD   XR Chest Port 1 View    Narrative    EXAM: XR CHEST PORT 1 VW  2/3/2021 1:30 AM     HISTORY:  worsening hypoxic respiratory failure       COMPARISON:  Chest x-ray 2/1/2021.    FINDINGS:   Portable upright radiograph of the chest. Right internal jugular  central venous catheter projects over the superior cavoatrial  junction. Feeding tube courses below the diaphragm and into the  stomach with the tip collimated out of the field of view. Postsurgical  changes of bilateral lung transplant with mediastinal clips and intact  median sternotomy wires. Trachea is midline. Cardiac silhouette is  largely obscured. No significant change in the diffuse mixed  interstitial and airspace opacities, most pronounced in the bilateral  lower lobes. Likely bilateral pleural effusions. No pneumothorax.    There is some contrast noted within the colon in the upper abdomen,  presumably from feeding tube placement 2/2/2021.      Impression    IMPRESSION:   1. Unchanged diffuse bilateral interstitial and airspace opacities,  with lower lung predominance.  2. Probable bilateral pleural effusions.    I have personally reviewed the examination and initial interpretation  and I agree with the findings.    NITHYA GUIDO MD   XR Chest Port 1 View    Narrative    EXAM: XR CHEST PORT 1 VW  2/3/2021 2:14 AM     HISTORY:  s/p intubation       COMPARISON:  Chest x-ray from same date at 0124 hours    FINDINGS:   Portable  supine radiograph of the chest. Endotracheal tube tip  projects over the low thoracic trachea approximately 2.4 cm above the  level of the gee. Right internal jugular central venous catheter  projects over the superior cavoatrial junction. Feeding tube courses  below the diaphragm and into the stomach with the tip collimated out  of the field of view. Postsurgical changes of bilateral lung  transplant with mediastinal clips and intact median sternotomy wires.  Trachea is midline. Cardiac silhouette is largely obscured. No  significant change in the diffuse mixed interstitial and airspace  opacities, most pronounced in the bilateral lower lobes. Likely  bilateral pleural effusions. No pneumothorax.      Impression    IMPRESSION:   1. Endotracheal tube tip projects over the low thoracic trachea,  approximately 2.4 cm above the level of the gee.  2. Unchanged diffuse mixed interstitial and airspace opacities.    I have personally reviewed the examination and initial interpretation  and I agree with the findings.    NITHYA GUIDO MD   XR Abdomen Port 1 View    Narrative    EXAM: XR ABDOMEN PORT 1 VW  2/3/2021 2:30 AM     HISTORY:  s/p intubation for placement of NJ       COMPARISON:  Abdominal radiograph 1/26/2021    FINDINGS:   Portable semiupright view of the abdomen. Feeding tube tip projects at  the expected location of the duodenal jejunal junction. Partial  contrast opacification of loops of bowel in the abdomen from prior  feeding tube placement on 2/2/2021. Nonobstructive bowel gas pattern.  No evidence of pneumatosis. Diffuse interstitial opacities in the  visualized lung bases.    Pression:  1. Feeding tube tip projects over the duodenal jejunal junction.  2. Nonobstructive bowel gas pattern.    I have personally reviewed the examination and initial interpretation  and I agree with the findings.    NITHYA GUIDO MD

## 2021-02-04 ENCOUNTER — APPOINTMENT (OUTPATIENT)
Dept: CT IMAGING | Facility: CLINIC | Age: 61
End: 2021-02-04
Attending: INTERNAL MEDICINE
Payer: COMMERCIAL

## 2021-02-04 LAB
ALBUMIN SERPL-MCNC: 1.9 G/DL (ref 3.4–5)
ALP SERPL-CCNC: 174 U/L (ref 40–150)
ALT SERPL W P-5'-P-CCNC: 33 U/L (ref 0–50)
ANION GAP SERPL CALCULATED.3IONS-SCNC: 10 MMOL/L (ref 3–14)
ANION GAP SERPL CALCULATED.3IONS-SCNC: 10 MMOL/L (ref 3–14)
ANION GAP SERPL CALCULATED.3IONS-SCNC: 7 MMOL/L (ref 3–14)
AST SERPL W P-5'-P-CCNC: 9 U/L (ref 0–45)
BASE DEFICIT BLDA-SCNC: 2.2 MMOL/L
BASE DEFICIT BLDA-SCNC: 2.4 MMOL/L
BASE DEFICIT BLDA-SCNC: 2.4 MMOL/L
BILIRUB SERPL-MCNC: 0.4 MG/DL (ref 0.2–1.3)
BUN SERPL-MCNC: 52 MG/DL (ref 7–30)
BUN SERPL-MCNC: 62 MG/DL (ref 7–30)
BUN SERPL-MCNC: 68 MG/DL (ref 7–30)
CA-I BLD-MCNC: 4.5 MG/DL (ref 4.4–5.2)
CA-I SERPL ISE-MCNC: 4.5 MG/DL (ref 4.4–5.2)
CALCIUM SERPL-MCNC: 7.8 MG/DL (ref 8.5–10.1)
CALCIUM SERPL-MCNC: 8 MG/DL (ref 8.5–10.1)
CALCIUM SERPL-MCNC: 8.2 MG/DL (ref 8.5–10.1)
CHLORIDE SERPL-SCNC: 100 MMOL/L (ref 94–109)
CHLORIDE SERPL-SCNC: 102 MMOL/L (ref 94–109)
CHLORIDE SERPL-SCNC: 103 MMOL/L (ref 94–109)
CO2 SERPL-SCNC: 23 MMOL/L (ref 20–32)
CO2 SERPL-SCNC: 23 MMOL/L (ref 20–32)
CO2 SERPL-SCNC: 26 MMOL/L (ref 20–32)
CREAT SERPL-MCNC: 2.81 MG/DL (ref 0.52–1.04)
CREAT SERPL-MCNC: 3.4 MG/DL (ref 0.52–1.04)
CREAT SERPL-MCNC: 3.84 MG/DL (ref 0.52–1.04)
ERYTHROCYTE [DISTWIDTH] IN BLOOD BY AUTOMATED COUNT: 15.1 % (ref 10–15)
GFR SERPL CREATININE-BSD FRML MDRD: 12 ML/MIN/{1.73_M2}
GFR SERPL CREATININE-BSD FRML MDRD: 14 ML/MIN/{1.73_M2}
GFR SERPL CREATININE-BSD FRML MDRD: 18 ML/MIN/{1.73_M2}
GLUCOSE BLDC GLUCOMTR-MCNC: 119 MG/DL (ref 70–99)
GLUCOSE BLDC GLUCOMTR-MCNC: 130 MG/DL (ref 70–99)
GLUCOSE BLDC GLUCOMTR-MCNC: 144 MG/DL (ref 70–99)
GLUCOSE BLDC GLUCOMTR-MCNC: 164 MG/DL (ref 70–99)
GLUCOSE BLDC GLUCOMTR-MCNC: 165 MG/DL (ref 70–99)
GLUCOSE BLDC GLUCOMTR-MCNC: 177 MG/DL (ref 70–99)
GLUCOSE BLDC GLUCOMTR-MCNC: 80 MG/DL (ref 70–99)
GLUCOSE SERPL-MCNC: 146 MG/DL (ref 70–99)
GLUCOSE SERPL-MCNC: 197 MG/DL (ref 70–99)
GLUCOSE SERPL-MCNC: 213 MG/DL (ref 70–99)
HCO3 BLD-SCNC: 23 MMOL/L (ref 21–28)
HCT VFR BLD AUTO: 26.9 % (ref 35–47)
HGB BLD-MCNC: 8.9 G/DL (ref 11.7–15.7)
MAGNESIUM SERPL-MCNC: 1.9 MG/DL (ref 1.6–2.3)
MAGNESIUM SERPL-MCNC: 2.1 MG/DL (ref 1.6–2.3)
MCH RBC QN AUTO: 30.2 PG (ref 26.5–33)
MCHC RBC AUTO-ENTMCNC: 33.1 G/DL (ref 31.5–36.5)
MCV RBC AUTO: 91 FL (ref 78–100)
O2/TOTAL GAS SETTING VFR VENT: 60 %
O2/TOTAL GAS SETTING VFR VENT: 60 %
O2/TOTAL GAS SETTING VFR VENT: 80 %
PCO2 BLD: 42 MM HG (ref 35–45)
PH BLD: 7.35 PH (ref 7.35–7.45)
PHOSPHATE SERPL-MCNC: 2.4 MG/DL (ref 2.5–4.5)
PHOSPHATE SERPL-MCNC: 3.2 MG/DL (ref 2.5–4.5)
PLATELET # BLD AUTO: 203 10E9/L (ref 150–450)
PO2 BLD: 61 MM HG (ref 80–105)
PO2 BLD: 65 MM HG (ref 80–105)
PO2 BLD: 78 MM HG (ref 80–105)
POSACONAZOLE SERPL-MCNC: 0.8 UG/ML (ref 0.7–5)
POTASSIUM SERPL-SCNC: 3.3 MMOL/L (ref 3.4–5.3)
POTASSIUM SERPL-SCNC: 3.8 MMOL/L (ref 3.4–5.3)
POTASSIUM SERPL-SCNC: 3.9 MMOL/L (ref 3.4–5.3)
PROT SERPL-MCNC: 5.2 G/DL (ref 6.8–8.8)
RBC # BLD AUTO: 2.95 10E12/L (ref 3.8–5.2)
SODIUM SERPL-SCNC: 133 MMOL/L (ref 133–144)
SODIUM SERPL-SCNC: 135 MMOL/L (ref 133–144)
SODIUM SERPL-SCNC: 137 MMOL/L (ref 133–144)
WBC # BLD AUTO: 17.3 10E9/L (ref 4–11)

## 2021-02-04 PROCEDURE — 250N000009 HC RX 250: Performed by: CLINICAL NURSE SPECIALIST

## 2021-02-04 PROCEDURE — 250N000013 HC RX MED GY IP 250 OP 250 PS 637: Performed by: NURSE PRACTITIONER

## 2021-02-04 PROCEDURE — 82330 ASSAY OF CALCIUM: CPT | Performed by: CLINICAL NURSE SPECIALIST

## 2021-02-04 PROCEDURE — 94003 VENT MGMT INPAT SUBQ DAY: CPT

## 2021-02-04 PROCEDURE — 94645 CONT INHLJ TX EACH ADDL HOUR: CPT

## 2021-02-04 PROCEDURE — 250N000013 HC RX MED GY IP 250 OP 250 PS 637: Performed by: INTERNAL MEDICINE

## 2021-02-04 PROCEDURE — 99291 CRITICAL CARE FIRST HOUR: CPT | Mod: GC | Performed by: INTERNAL MEDICINE

## 2021-02-04 PROCEDURE — 250N000011 HC RX IP 250 OP 636: Performed by: STUDENT IN AN ORGANIZED HEALTH CARE EDUCATION/TRAINING PROGRAM

## 2021-02-04 PROCEDURE — 250N000013 HC RX MED GY IP 250 OP 250 PS 637: Performed by: STUDENT IN AN ORGANIZED HEALTH CARE EDUCATION/TRAINING PROGRAM

## 2021-02-04 PROCEDURE — 71250 CT THORAX DX C-: CPT

## 2021-02-04 PROCEDURE — 83735 ASSAY OF MAGNESIUM: CPT | Performed by: CLINICAL NURSE SPECIALIST

## 2021-02-04 PROCEDURE — 250N000011 HC RX IP 250 OP 636: Performed by: INTERNAL MEDICINE

## 2021-02-04 PROCEDURE — 94640 AIRWAY INHALATION TREATMENT: CPT

## 2021-02-04 PROCEDURE — 999N000015 HC STATISTIC ARTERIAL MONITORING DAILY

## 2021-02-04 PROCEDURE — 94640 AIRWAY INHALATION TREATMENT: CPT | Mod: 76

## 2021-02-04 PROCEDURE — 87449 NOS EACH ORGANISM AG IA: CPT | Performed by: INTERNAL MEDICINE

## 2021-02-04 PROCEDURE — 200N000002 HC R&B ICU UMMC

## 2021-02-04 PROCEDURE — 250N000012 HC RX MED GY IP 250 OP 636 PS 637: Performed by: NURSE PRACTITIONER

## 2021-02-04 PROCEDURE — 94668 MNPJ CHEST WALL SBSQ: CPT

## 2021-02-04 PROCEDURE — 80053 COMPREHEN METABOLIC PANEL: CPT | Performed by: STUDENT IN AN ORGANIZED HEALTH CARE EDUCATION/TRAINING PROGRAM

## 2021-02-04 PROCEDURE — 82803 BLOOD GASES ANY COMBINATION: CPT | Performed by: STUDENT IN AN ORGANIZED HEALTH CARE EDUCATION/TRAINING PROGRAM

## 2021-02-04 PROCEDURE — 71250 CT THORAX DX C-: CPT | Mod: 26 | Performed by: RADIOLOGY

## 2021-02-04 PROCEDURE — 83735 ASSAY OF MAGNESIUM: CPT | Performed by: STUDENT IN AN ORGANIZED HEALTH CARE EDUCATION/TRAINING PROGRAM

## 2021-02-04 PROCEDURE — 258N000003 HC RX IP 258 OP 636: Performed by: NURSE PRACTITIONER

## 2021-02-04 PROCEDURE — 250N000011 HC RX IP 250 OP 636: Performed by: NURSE PRACTITIONER

## 2021-02-04 PROCEDURE — 999N000185 HC STATISTIC TRANSPORT TIME EA 15 MIN

## 2021-02-04 PROCEDURE — 80187 DRUG ASSAY POSACONAZOLE: CPT

## 2021-02-04 PROCEDURE — 250N000009 HC RX 250: Performed by: NURSE PRACTITIONER

## 2021-02-04 PROCEDURE — 82330 ASSAY OF CALCIUM: CPT | Performed by: STUDENT IN AN ORGANIZED HEALTH CARE EDUCATION/TRAINING PROGRAM

## 2021-02-04 PROCEDURE — 80048 BASIC METABOLIC PNL TOTAL CA: CPT | Performed by: CLINICAL NURSE SPECIALIST

## 2021-02-04 PROCEDURE — 90947 DIALYSIS REPEATED EVAL: CPT

## 2021-02-04 PROCEDURE — 99233 SBSQ HOSP IP/OBS HIGH 50: CPT | Mod: 25 | Performed by: NURSE PRACTITIONER

## 2021-02-04 PROCEDURE — 250N000012 HC RX MED GY IP 250 OP 636 PS 637: Performed by: INTERNAL MEDICINE

## 2021-02-04 PROCEDURE — 85027 COMPLETE CBC AUTOMATED: CPT | Performed by: STUDENT IN AN ORGANIZED HEALTH CARE EDUCATION/TRAINING PROGRAM

## 2021-02-04 PROCEDURE — 999N001017 HC STATISTIC GLUCOSE BY METER IP

## 2021-02-04 PROCEDURE — 84100 ASSAY OF PHOSPHORUS: CPT | Performed by: STUDENT IN AN ORGANIZED HEALTH CARE EDUCATION/TRAINING PROGRAM

## 2021-02-04 PROCEDURE — 272N000078 HC NUTRITION PRODUCT INTERMEDIATE LITER

## 2021-02-04 PROCEDURE — 999N000157 HC STATISTIC RCP TIME EA 10 MIN

## 2021-02-04 PROCEDURE — 84100 ASSAY OF PHOSPHORUS: CPT | Performed by: CLINICAL NURSE SPECIALIST

## 2021-02-04 RX ORDER — NICOTINE POLACRILEX 4 MG
15-30 LOZENGE BUCCAL
Status: CANCELLED | OUTPATIENT
Start: 2021-02-04

## 2021-02-04 RX ORDER — MAGNESIUM SULFATE HEPTAHYDRATE 40 MG/ML
2 INJECTION, SOLUTION INTRAVENOUS EVERY 6 HOURS PRN
Status: DISCONTINUED | OUTPATIENT
Start: 2021-02-04 | End: 2021-02-07

## 2021-02-04 RX ORDER — DEXTROSE MONOHYDRATE 25 G/50ML
25-50 INJECTION, SOLUTION INTRAVENOUS
Status: CANCELLED | OUTPATIENT
Start: 2021-02-04

## 2021-02-04 RX ORDER — POTASSIUM CHLORIDE 29.8 MG/ML
20 INJECTION INTRAVENOUS EVERY 6 HOURS PRN
Status: DISCONTINUED | OUTPATIENT
Start: 2021-02-04 | End: 2021-02-07

## 2021-02-04 RX ORDER — SULFAMETHOXAZOLE AND TRIMETHOPRIM 200; 40 MG/5ML; MG/5ML
580 SUSPENSION ORAL 2 TIMES DAILY
Status: DISCONTINUED | OUTPATIENT
Start: 2021-02-04 | End: 2021-02-08

## 2021-02-04 RX ORDER — PIPERACILLIN SODIUM, TAZOBACTAM SODIUM 4; .5 G/20ML; G/20ML
4.5 INJECTION, POWDER, LYOPHILIZED, FOR SOLUTION INTRAVENOUS EVERY 6 HOURS
Status: DISCONTINUED | OUTPATIENT
Start: 2021-02-04 | End: 2021-02-08

## 2021-02-04 RX ADMIN — NYSTATIN 1000000 UNITS: 500000 SUSPENSION ORAL at 13:19

## 2021-02-04 RX ADMIN — PROPOFOL 30 MCG/KG/MIN: 10 INJECTION, EMULSION INTRAVENOUS at 13:49

## 2021-02-04 RX ADMIN — Medication 20 NG/KG/MIN: at 14:43

## 2021-02-04 RX ADMIN — CALCIUM CHLORIDE, MAGNESIUM CHLORIDE, SODIUM CHLORIDE, SODIUM BICARBONATE, POTASSIUM CHLORIDE AND SODIUM PHOSPHATE DIBASIC DIHYDRATE 12.5 ML/KG/HR: 3.68; 3.05; 6.34; 3.09; .314; .187 INJECTION INTRAVENOUS at 12:14

## 2021-02-04 RX ADMIN — CALCIUM CHLORIDE, MAGNESIUM CHLORIDE, SODIUM CHLORIDE, SODIUM BICARBONATE, POTASSIUM CHLORIDE AND SODIUM PHOSPHATE DIBASIC DIHYDRATE 12.5 ML/KG/HR: 3.68; 3.05; 6.34; 3.09; .314; .187 INJECTION INTRAVENOUS at 19:48

## 2021-02-04 RX ADMIN — INSULIN ASPART 4 UNITS: 100 INJECTION, SOLUTION INTRAVENOUS; SUBCUTANEOUS at 00:09

## 2021-02-04 RX ADMIN — NYSTATIN 1000000 UNITS: 500000 SUSPENSION ORAL at 20:08

## 2021-02-04 RX ADMIN — BUDESONIDE 0.5 MG: 0.5 INHALANT ORAL at 07:37

## 2021-02-04 RX ADMIN — PROPOFOL 30 MCG/KG/MIN: 10 INJECTION, EMULSION INTRAVENOUS at 21:28

## 2021-02-04 RX ADMIN — LEVALBUTEROL HYDROCHLORIDE 1.25 MG: 1.25 SOLUTION RESPIRATORY (INHALATION) at 20:31

## 2021-02-04 RX ADMIN — Medication 7 MG/HR: at 21:19

## 2021-02-04 RX ADMIN — LEVALBUTEROL HYDROCHLORIDE 1.25 MG: 1.25 SOLUTION RESPIRATORY (INHALATION) at 11:51

## 2021-02-04 RX ADMIN — PIPERACILLIN SODIUM AND TAZOBACTAM SODIUM 4.5 G: 4; .5 INJECTION, POWDER, LYOPHILIZED, FOR SOLUTION INTRAVENOUS at 20:02

## 2021-02-04 RX ADMIN — INSULIN ASPART 2 UNITS: 100 INJECTION, SOLUTION INTRAVENOUS; SUBCUTANEOUS at 12:07

## 2021-02-04 RX ADMIN — INSULIN ASPART 1 UNITS: 100 INJECTION, SOLUTION INTRAVENOUS; SUBCUTANEOUS at 04:30

## 2021-02-04 RX ADMIN — SODIUM CHLORIDE 300 MG: 9 INJECTION, SOLUTION INTRAVENOUS at 08:31

## 2021-02-04 RX ADMIN — HEPARIN SODIUM 5000 UNITS: 5000 INJECTION, SOLUTION INTRAVENOUS; SUBCUTANEOUS at 23:38

## 2021-02-04 RX ADMIN — HEPARIN SODIUM 5000 UNITS: 5000 INJECTION, SOLUTION INTRAVENOUS; SUBCUTANEOUS at 08:24

## 2021-02-04 RX ADMIN — Medication 5 ML: at 08:26

## 2021-02-04 RX ADMIN — POLYETHYLENE GLYCOL 3350 17 G: 17 POWDER, FOR SOLUTION ORAL at 08:24

## 2021-02-04 RX ADMIN — TACROLIMUS 1 MG: 5 CAPSULE ORAL at 17:17

## 2021-02-04 RX ADMIN — FENTANYL CITRATE 50 MCG: 50 INJECTION, SOLUTION INTRAMUSCULAR; INTRAVENOUS at 10:44

## 2021-02-04 RX ADMIN — MIDAZOLAM 1 MG: 1 INJECTION INTRAMUSCULAR; INTRAVENOUS at 10:44

## 2021-02-04 RX ADMIN — PREDNISONE 40 MG: 20 TABLET ORAL at 08:25

## 2021-02-04 RX ADMIN — TACROLIMUS 1 MG: 5 CAPSULE ORAL at 08:26

## 2021-02-04 RX ADMIN — PIPERACILLIN SODIUM AND TAZOBACTAM SODIUM 2.25 G: 2; .25 INJECTION, POWDER, LYOPHILIZED, FOR SOLUTION INTRAVENOUS at 13:49

## 2021-02-04 RX ADMIN — LEVALBUTEROL HYDROCHLORIDE 1.25 MG: 1.25 SOLUTION RESPIRATORY (INHALATION) at 07:37

## 2021-02-04 RX ADMIN — SULFAMETHOXAZOLE AND TRIMETHOPRIM 295 MG: 200; 40 SUSPENSION ORAL at 08:25

## 2021-02-04 RX ADMIN — BUDESONIDE 0.5 MG: 0.5 INHALANT ORAL at 20:31

## 2021-02-04 RX ADMIN — PIPERACILLIN SODIUM AND TAZOBACTAM SODIUM 2.25 G: 2; .25 INJECTION, POWDER, LYOPHILIZED, FOR SOLUTION INTRAVENOUS at 08:13

## 2021-02-04 RX ADMIN — VALGANCICLOVIR HYDROCHLORIDE 450 MG: 50 POWDER, FOR SOLUTION ORAL at 20:08

## 2021-02-04 RX ADMIN — Medication 40 MG: at 08:25

## 2021-02-04 RX ADMIN — INSULIN GLARGINE 10 UNITS: 100 INJECTION, SOLUTION SUBCUTANEOUS at 07:54

## 2021-02-04 RX ADMIN — INSULIN ASPART 1 UNITS: 100 INJECTION, SOLUTION INTRAVENOUS; SUBCUTANEOUS at 07:54

## 2021-02-04 RX ADMIN — HEPARIN SODIUM 5000 UNITS: 5000 INJECTION, SOLUTION INTRAVENOUS; SUBCUTANEOUS at 15:44

## 2021-02-04 RX ADMIN — SULFAMETHOXAZOLE AND TRIMETHOPRIM 580 MG: 200; 40 SUSPENSION ORAL at 20:07

## 2021-02-04 RX ADMIN — Medication 7 MG/HR: at 07:57

## 2021-02-04 RX ADMIN — PIPERACILLIN SODIUM AND TAZOBACTAM SODIUM 2.25 G: 2; .25 INJECTION, POWDER, LYOPHILIZED, FOR SOLUTION INTRAVENOUS at 01:54

## 2021-02-04 RX ADMIN — NYSTATIN 1000000 UNITS: 500000 SUSPENSION ORAL at 15:44

## 2021-02-04 RX ADMIN — NYSTATIN 1000000 UNITS: 500000 SUSPENSION ORAL at 08:25

## 2021-02-04 RX ADMIN — HEPARIN SODIUM 5000 UNITS: 5000 INJECTION, SOLUTION INTRAVENOUS; SUBCUTANEOUS at 00:07

## 2021-02-04 RX ADMIN — CALCIUM CHLORIDE, MAGNESIUM CHLORIDE, SODIUM CHLORIDE, SODIUM BICARBONATE, POTASSIUM CHLORIDE AND SODIUM PHOSPHATE DIBASIC DIHYDRATE 3.41 ML/KG/HR: 3.68; 3.05; 6.34; 3.09; .314; .187 INJECTION INTRAVENOUS at 12:14

## 2021-02-04 RX ADMIN — LEVALBUTEROL HYDROCHLORIDE 1.25 MG: 1.25 SOLUTION RESPIRATORY (INHALATION) at 15:48

## 2021-02-04 ASSESSMENT — ACTIVITIES OF DAILY LIVING (ADL)
ADLS_ACUITY_SCORE: 18

## 2021-02-04 NOTE — PROGRESS NOTES
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 Nephrology consutled for dialysis needs.     Interval History :   Have held off on HD the past 2 days, is up ~4L in that time, had hoped her pressor needs would improve enough to try iHD today but now on 2nd pressor.  Given this and need to pull ~3L to be net even we are planning on temp catheter access and starting CRRT.  Ordered for 0-50cc/hr net negative to try to help pulm status although much of it appears infection related by CT.       Assessment & Recommendations:   ESRD-Runs at [place] through [facility].  Has atypical HD schedule of Monday and Thursday, has AVF with partial graft which has been challenging to access per RN's.  Have avoided CRRT, running 3-4x/week to aggressively treat any pulm edema portion of resp failure but now re-intubated and proned.                 -Starting CRRT with goal of 0-50cc/hr net negative as BP's allow.  4k baths, standard Rx.                -Has L arm AVF/graft, somewhat challenging access.    -Appreciate team placing line today.        Volume status-Held off on HD the past 2 days, up ~4L in that time and now on 2nd pressor.  Starting CRRT with goal of at least matching intake, ordered for 0-50cc/hr net negative.       Electrolytes/pH-K 3.3, bicarb 23, no acute issues but starting CRRT for volume.       Ca/phos/pth-Ca 9.0, Mg and Phos mildly up last check.      Anemia-Hgb 8.9, down from 10 yesterday, acute management per team.  Checked iron stores and sats 36% on 2/3.     Nutrition-Nutren TF.       Seen and discussed with  "Dr Gamble     Recommendations were communicated to primary team via verbal communication.      ADRIÁN Lo CNS  Clinical Nurse Specialist  [phone]    Review of Systems:   I reviewed the following systems:  ROS not done due to vent/sedation.     Physical Exam:   I/O last 3 completed shifts:  In: 3195.06 [I.V.:1945.06; NG/GT:330]  Out: 50 [Stool:50]   /59   Pulse 82   Temp 98.1  F (36.7  C)   Resp 24   Ht 1.6 m (5' 2.99\")   Wt 60.7 kg (133 lb 13.1 oz)   LMP 06/07/2014 (Exact Date)   SpO2 93%   BMI 23.71 kg/m       GENERAL APPEARANCE: Re-intubated, proned.   EYES: no scleral icterus  Endo: no moon facies  Pulmonary: Intubated, proned, equal expansion.    CV: regular rhythm, normal rate - Edema present  GI: soft, non distended  MS: no evidence of inflammation in joints, no muscle tenderness  :  Basilio present  SKIN: warm, dry, +1 edema.    NEURO: intubated and sedated.      Labs:   All labs reviewed by me  Electrolytes/Renal -   Recent Labs   Lab Test 02/04/21 0310 02/03/21 0415 02/02/21 0435 01/31/21  0441 01/31/21  0441    133 134   < > 133   POTASSIUM 3.3* 3.3* 3.9   < > 3.1*   CHLORIDE 100 98 100   < > 97   CO2 23 25 26   < > 25   BUN 68* 57* 35*   < > 37*   CR 3.84* 3.45* 2.63*   < > 3.02*   * 138* 187*   < > 260*   CHELSEY 8.0* 8.9 9.0   < > 8.8   MAG 1.9 2.1 1.8  --  1.9   PHOS 2.4* 2.6  --   --  3.4    < > = values in this interval not displayed.       CBC -   Recent Labs   Lab Test 02/04/21 0310 02/03/21 0415 02/02/21  0435   WBC 17.3* 21.7* 10.5   HGB 8.9* 10.0* 10.0*    290 259       LFTs -   Recent Labs   Lab Test 02/04/21 0310 01/29/21  0416 01/28/21  0412   ALKPHOS 174* 135 164*   BILITOTAL 0.4 0.5 0.6   ALT 33 21 26   AST 9 9 10   PROTTOTAL 5.2* 5.3* 5.3*   ALBUMIN 1.9* 1.9* 1.8*       Iron Panel -   Recent Labs   Lab Test 02/03/21  0415 12/13/18  1033 08/01/18  0921   IRON 51 16* 93   IRONSAT 36 7* 37   YOLA  --  302* 571*           Current " Medications:    B and C vitamin Complex with folic acid  5 mL Oral or Feeding Tube Daily     budesonide  0.5 mg Nebulization BID     heparin ANTICOAGULANT  5,000 Units Subcutaneous Q8H     insulin aspart  1-12 Units Subcutaneous Q4H     insulin glargine  10 Units Subcutaneous QAM AC     levalbuterol  1.25 mg Nebulization 4x Daily     nystatin  1,000,000 Units Mouth/Throat 4x Daily     pantoprazole  40 mg Oral QAM AC     piperacillin-tazobactam  2.25 g Intravenous Q6H     polyethylene glycol  17 g Oral or Feeding Tube Daily     posaconazole (NOXAFIL) intermittent infusion  300 mg Intravenous Daily     [Held by provider] predniSONE  2.5 mg Oral or Feeding Tube QPM     [START ON 2/7/2021] predniSONE  20 mg Oral Daily     predniSONE  40 mg Oral Daily     [Held by provider] predniSONE  5 mg Oral or Feeding Tube QAM     sulfamethoxazole-trimethoprim  295 mg Oral or Feeding Tube BID     tacrolimus  1 mg Oral or Feeding Tube BID IS     valGANciclovir  450 mg Oral Once per day on Mon Thu       dextrose       CRRT replacement solution 12.5 mL/kg/hr (02/04/21 1214)     epoprostenol (VELETRI) 20 mcg/mL in sterile water inhalation solution 20 ng/kg/min (02/04/21 0400)     fentaNYL 50 mcg/hr (02/04/21 1400)     midazolam 7 mg/hr (02/04/21 1400)     - MEDICATION INSTRUCTIONS -       norepinephrine 0.08 mcg/kg/min (02/04/21 1400)     CRRT replacement solution 3.413 mL/kg/hr (02/04/21 1214)     CRRT replacement solution 12.5 mL/kg/hr (02/04/21 1214)     propofol (DIPRIVAN) infusion 30 mcg/kg/min (02/04/21 1400)     sodium chloride 10 mL/hr at 02/03/21 0400     vasopressin 2.4 Units/hr (02/04/21 1400)

## 2021-02-04 NOTE — PLAN OF CARE
ICU End of Shift Summary. See flowsheets for vital signs and detailed assessment.    Changes this shift: Sedate with propofol, versed and fentanyl to keep patient deeply sedated in prone position, FiO2 down to 60%, , RR 24, PEEP of 8, veletri increased to full strength; hemodynamically requiring vaso and levo to keep MAP >65; afebrile, blood cultures, sputum, MRSA, and COVID sent; broad spectrum abx restarted; dialysis held today d/t pressor requirements, will reassess tomorrow and potentially need a line for CRRT    Plan: Continue plan of care, supine at 2300, CT scan if stable, possible bronch tomorrow; care conference tomorrow or Friday    Problem: Adult Inpatient Plan of Care  Goal: Plan of Care Review  Outcome: No Change     Problem: ARDS (Acute Respiratory Distress Syndrome)  Goal: Effective Oxygenation  Outcome: No Change     Problem: Gas Exchange Impaired  Goal: Optimal Gas Exchange  Outcome: No Change  Intervention: Optimize Oxygenation and Ventilation  Recent Flowsheet Documentation  Taken 2/3/2021 1600 by Leela Naranjo RN  Head of Bed (\[facility]\"") Positioning: (reverse trend) other (see comments)  Taken 2/3/2021 1530 by Leela Naranjo RN  Head of Bed (\[facility]\"") Positioning: (brachial ) other (see comments)  Taken 2/3/2021 1300 by Leela Naranjo RN  Head of Bed (\[facility]\"") Positioning: (brachial) other (see comments)  Taken 2/3/2021 1200 by Leela Naranjo RN  Head of Bed (\[facility]\"") Positioning: (reverse trend) other (see comments)  Taken 2/3/2021 1030 by Leela Naranjo RN  Head of Bed (\[facility]\"") Positioning: (reverse trend;) other (see comments)  Taken 2/3/2021 0800 by Leela Naranjo RN  Head of Bed (\[facility]\"") Positioning: (reverse trend) other (see comments)

## 2021-02-04 NOTE — PROGRESS NOTES
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  Patient was admitted to OSH on  for acute hypoxemic respiratory failure and new lung opacities. Intubated  and transferred to [facility] for ongoing management.  Extubated  but reintubated - for progressive hypoxemia. Hypoxia persists and has remained on high O2 requirements (HFNC during the day, and BiPAP while sleeping). Atrial fibrillation  and started on metoprolol. Rapid decompensation and ARDS 2/3 early AM requiring reintubation, prone positioning, and inhaled Veletri. Significant rise in leukocytosis c/f worsening PJP vs secondary infection     Recommendations:  - Bronchoscopy for inspection and airway clearance by MICU when able to tolerate (agree with deferring for now given tenuous pulmonary status)  - ABX per MICU: Bactrim for PJP and Steno coverage, and Zosyn for broad, pending cultures. Agree with discontinuing vancomycin given negative MRSA nasal swab  - Follow pending blood and sputum cultures  - Repeat fungitell () pending  - Steroid burst and taper per MICU, resume chronic prednisone upon burst/taper completion  - Tacrolimus level on  (ordered)  - Planning for family care conference for Friday at 1pm for medical update and goals of care discussion     Acute hypoxemic respiratory failure:   Right post-obstructive Stenotrophomonas and Eikenella pneumonia:  PJP pneumonia:  Septic shock:   ARDS: Presented to  OSH ED with increased SOB and hypoxia, initially requiring 4L NC but continued to decline and subsequently intubated 1/24. Hemodynamic instability after intubation requiring pressors, transferred to [facility]. Afebrile but with leukocytosis. COVID and respiratory panel negative. PTA bronch (12/23/20) with moderate airway obstruction and RML/RLL mucous plugging, cultures with Stenotrophomonas and Eikenella (IV ceftazidime 1/13-1/19). OSH CT chest with new multifocal GGO bilaterally and increased left loculated pleural effusion. Repeat bronch (1/24) with RUL BAL with thick copious secretions to RML/RLL. + PJP PCR from BAL (1/24). S/p thoracentsis (1/25)), cultures NGTD. BD glucan slightly elevated at 261 (from 106), but within her recent range. Extubated to 4L on 1/26. Reintubated 1/27-1/29 for progressive hypoxemia. Most recent CXR (2/1) for interval follow up with slightly improved extensive bilateral mixed interstitial and airspace opacities with lower lung predominance  Had remained on either HFNC %FiO2, or BiPAP %FiO2. Decompensated 2/3 and reintubated. Repeat ABGs with P/F ratio significant for ARDS so patient prone positioned and inhaled Veletri initiated, became hypotensive requiring pressors. Significant rise in leukocytosis c/f worsening PJP vs secondary infection, remains afebrile, procalcitonin 0.43. Tolerated supine position overnight. ARDS persists (P/F ratio 102), remains on inhaled Veletri (20ng/kg/min), MICU planning to prone patient again, awaiting possible HD line placement for CRRT.  - Blood cultures and fungal blood cultures (1/24) NGTD, repeat blood cultures (2/3), NGTD   - BAL cultures (1/24) with PJP PCR and BDG fungitell positive at 261 (1/28/21) and increased from prior 106 (9/9/20). Repeat fungitell (2/4) pending  - Sputum culture/gram stain (2/3) NGTD  - ABX per MICU: Bactrim (1/24) for PJP and Steno coverage, and Zosyn (2/3) for broad, pending cultures. Agree with discontinuing  vancomycin given negative MRSA nasal swab (2/4). (S/p ceftriaxone 1/29-2/1 and Zosyn 1/24-1/29 for Eikenella coverage)  - Stress dose steroids and taper per MICU  - Repeat CT chest 2/3 PM with findings c/w ARDS secondary to known pneumocystis jiroveci infection, the degree of consolidation has progressed, cannot exclude superimposed infection, no significant change in LLL chronic empyema (personally reviewed with Dr. James)  - Pulmonary toilet with chest physiotherapy QID and nebs: Xopenex QID (2/2, s/p PTA Duoneb), Mucomyst QID, and Pulmicort BID  - Ventilator and ARDS management per MICU  - Planning for family care conference for Friday at 1pm for medical update and goals of care discussion     Aspergillus empyema (left-sided): First noted 10/8/19. CT scan on 7/17/20 with increased mass-like density, likely pleural-based, in LLL area s/p needle aspiration (8/13/20) with Aspergillus fumigatus on cultures s/p intrapleural amphotericin bead placement (11/20).  Following with ID as OP.  LFTs stable on admission (ALP chronically mildly elevated). CT chest with increased loculated left pleural effusion s/p thoracentesis (1/25), exudative with 87% neutrophils, very high LDL (6308), cytology normal. Cultures NGTD.  - Pleural cultures (1/25) NGTD  - PTA posaconazole, indefinite course; level 1/26 therapeutic, repeat level 2/4, pending (QTc and LFT monitoring per primary)     S/p bilateral lung transplant for ILD 2/2 CTD:   Right mainstem bronchial stenosis (s/p dilation 3/2019): Last seen in pulmonary clinic 12/2. DSA (1/25) not detected. Continued right mainstem stenosis noted with PTA bronch on 12/23/20, mild on 1/24 bronch.  - Follow up with IP as OP for evaluation of right bronchial stenosis     Immunosuppression: On 2 drug IST d/t recurrent infections  - Tacrolimus 1 mg BID (increased 2/3). Goal level 8-10.  Recheck level 2/6 (ordered)  - Holding chronic prednisone while on stress dose steroids (5mg qAM and 2.5mg  qPM)     Prophylaxis:   - Bactrim treatment dose as above. PTA had not been on PJP ppx since 7/28/20 as CD4 > 200.      H/o CMV viremia: CMV D+/R+.  CMV <137 (1/25).  - VGCV for CMV ppx with stress dose steroids (1/26)      EBV viremia: Level 12/9/20 mildly elevated to 10K (log 4) from prior 3K (3.5 log) on 9/9/20, repeat (1/25/21) decreased to 5619 copies (log of 3.8).  - Recheck in 1 month-> 2/22     Oropharyngeal candidiasis: White tongue plaque noted 2/1.  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 Chronic iHD M/Th via AVF with partial graft. Not able to tolerate iHD 2/3 d/t hypotension. Possible CRRT with HD line placement today 2/4  - Monitoring of tacrolimus as above  - Dialysis management per nephrology (M/Th iHD)     Atrial fibrillation w/ RVR: H/o paroxysmal AF, last 10/2019. Recurrence 1/30 with RVR (-140s). Likely in the setting of acute pulmonary illness.  - Management per MICU. Metoprolol (1/30, increased 1/31 and 2/1)  - Please let our team know if planning to start diltiazem given known interaction with tacrolimus     We appreciate the excellent care provided by the MICU team. Recommendations communicated via this note. Will continue to follow along closely, please do not hesitate to call with any questions or concerns.     Patient discussed with Dr. Erika Ibarra, APRN, CNP   Inpatient Nurse Practitioner  Pulmonary CF/Transplant     Subjective & Interval History:     Remains intubated and sedated on full ventilator support. Tolerated supine position late last evening and tolerated CT chest. FiO2 requirements tenuous, ranging from 60%-100%FiO2. Currently settings: 24/320/8/80. Nursing suctioning infrequently with small/scant amount tan/pink sputum. WBC improving.  Continues in Afib with HR better controlled . TF is at goal rate since 2/2. Stool x3 yesterday. Aneuric, and net +2965 mL yesterday, iHD deferred yesterday d/t hypotension.    Review of Systems:  "    ROS as above, otherwise limited due to intubation and sedation    Physical Exam:     Vital signs:  Temp: 98.2  F (36.8  C) Temp src: Axillary BP: 134/65 Pulse: 105   Resp: 22 SpO2: 91 % O2 Device: Mechanical Ventilator Oxygen Delivery: 60 LPM Height: 160 cm (5' 2.99\") Weight: 60.7 kg (133 lb 13.1 oz)  I/O:     Intake/Output Summary (Last 24 hours) at 2/4/2021 1029  Last data filed at 2/4/2021 1000  Gross per 24 hour   Intake 3101.92 ml   Output 50 ml   Net 3051.92 ml     Constitutional: lying in bed, supine position, sedated and in no apparent distress.   HEENT: Orally intubated, eyes closed. PERRL.  PULM: Fair airflow bilaterally. Coarse crackles/rhonchi t/o, no wheezes. On full ventilator support, No dyssynchrony.  CV: Normal S1 and S2. irregular. No murmur, gallop, or rub. No peripheral edema.   ABD: NABS, soft, nontender, nondistended.    MSK:  No apparent muscle wasting. Not moving extremeties  NEURO: Sedated   SKIN: Warm, dry. No rash on limited exam.   PSYCH: Calm      Lines, Drains, and Devices:  Peripheral IV 02/03/21 Anterior;Right Upper forearm (Active)   Site Assessment WDL 02/04/21 0800   Line Status Infusing 02/04/21 0800   Dressing Intervention New dressing  02/03/21 1000   Phlebitis Scale 0-->no symptoms 02/04/21 0800   Infiltration Scale 0 02/04/21 0800   If infiltrated, was a Vesicant infusing? No 02/04/21 0400   Number of days: 1       CVC Double Lumen 10/25/19 Right Internal jugular (Active)   Number of days: 468       Hemodialysis Vascular Access Arteriovenous fistula Left Arm (Active)   Site Assessment Bruit present;Thrill present 02/04/21 0800   Cannulation Needle Size 15 02/01/21 1133   Dressing Intervention Dressing changed 02/01/21 1600   Dressing Status Not applicable 02/04/21 0800   Verification by x-ray Not applicable 01/28/21 1520   Hand Off Report Yes 01/30/21 1250   Number of days: 10       CVC TRIPLE LUMEN Right Internal jugular (Active)   Site Assessment WDL 02/04/21 0800 "   Dressing Type Chlorhexidine sponge;Transparent 02/04/21 0800   Dressing Status clean;dry;intact 02/04/21 0800   Dressing Intervention dressing changed 01/31/21 0400   Dressing Change Due 02/07/21 02/04/21 0800   Line Necessity yes, meets criteria 02/04/21 0800   Blue - Status infusing 02/04/21 0800   Blue - Cap Change Due 02/05/21 02/04/21 0800   Brown - Status infusing 02/04/21 0800   Brown - Cap Change Due 02/05/21 02/04/21 0800   White - Status infusing 02/04/21 0800   White - Cap Change Due 02/05/21 02/04/21 0800   Phlebitis Scale 0-->no symptoms 02/04/21 0800   Infiltration? no 02/04/21 0800   Infiltration Scale 0 02/04/21 0400   Infiltration Site Treatment Method  None 02/04/21 0400   CVC Comment CDI 02/04/21 0800   Number of days: 11     Data:     LABS    CMP:   Recent Labs   Lab 02/04/21  0310 02/03/21  0415 02/02/21  0435 02/01/21  0424 01/31/21  0441 01/29/21  0416 01/29/21  0416    133 134 134 133   < > 137   POTASSIUM 3.3* 3.3* 3.9 3.3* 3.1*   < > 3.4   CHLORIDE 100 98 100 99 97   < > 100   CO2 23 25 26 28 25   < > 26   ANIONGAP 10 10 8 7 11   < > 11   * 138* 187* 184* 260*   < > 164*   BUN 68* 57* 35* 55* 37*   < > 28   CR 3.84* 3.45* 2.63* 3.76* 3.02*   < > 2.76*   GFRESTIMATED 12* 14* 19* 13* 16*   < > 18*   GFRESTBLACK 14* 16* 22* 14* 19*   < > 21*   CHELSEY 8.0* 8.9 9.0 9.0 8.8   < > 8.4*   MAG 1.9 2.1 1.8  --  1.9   < >  --    PHOS 2.4* 2.6  --   --  3.4  --   --    PROTTOTAL 5.2*  --   --   --   --   --  5.3*   ALBUMIN 1.9*  --   --   --   --   --  1.9*   BILITOTAL 0.4  --   --   --   --   --  0.5   ALKPHOS 174*  --   --   --   --   --  135   AST 9  --   --   --   --   --  9   ALT 33  --   --   --   --   --  21    < > = values in this interval not displayed.     CBC:   Recent Labs   Lab 02/04/21  0310 02/03/21  0415 02/02/21  0435 02/01/21  0424   WBC 17.3* 21.7* 10.5 10.5   RBC 2.95* 3.45* 3.43* 3.52*   HGB 8.9* 10.0* 10.0* 10.5*   HCT 26.9* 31.3* 31.4* 31.8*   MCV 91 91 92 90   MCH  30.2 29.0 29.2 29.8   MCHC 33.1 31.9 31.8 33.0   RDW 15.1* 15.3* 15.1* 14.9    290 259 275       INR: No lab results found in last 7 days.    Glucose:   Recent Labs   Lab 02/04/21  0751 02/04/21  0416 02/04/21  0310 02/03/21  2347 02/03/21  1957 02/03/21  1603 02/03/21  1213 02/03/21  0415 02/03/21  0415 02/02/21  0435 02/02/21  0435 02/01/21  0424 02/01/21  0424 01/31/21 0441 01/31/21 0441 01/30/21 0417 01/30/21 0417   GLC  --   --  213*  --   --   --   --   --  138*  --  187*  --  184*  --  260*  --  152*   * 164*  --  220* 217* 210* 235*   < >  --    < >  --    < >  --    < >  --    < >  --     < > = values in this interval not displayed.       Blood Gas:   Recent Labs   Lab 02/04/21  0305 02/03/21  2350 02/03/21  1210 01/31/21 0441 01/31/21 0441 01/29/21 0416 01/29/21 0416   PHV  --   --   --   --  7.43  --  7.54*   PCO2V  --   --   --   --  41  --  35*   PO2V  --   --   --   --  50*  --  37   HCO3V  --   --   --   --  27  --  30*   LASHAUN  --   --   --   --  2.6  --  6.8   O2PER 60.0 60 70   < > 70.0   < > 50.0    < > = values in this interval not displayed.       Culture Data   Recent Labs   Lab 02/03/21  0941 02/03/21  0900 02/03/21  0816   CULT PENDING No growth after 17 hours No growth after 17 hours       Virology Data:   Lab Results   Component Value Date    FLUAH1 Not Detected 01/24/2021    FLUAH3 Not Detected 01/24/2021    FD2559 Not Detected 01/24/2021    IFLUB Not Detected 01/24/2021    RSVA Not Detected 01/24/2021    RSVB Not Detected 01/24/2021    PIV1 Not Detected 01/24/2021    PIV2 Not Detected 01/24/2021    PIV3 Not Detected 01/24/2021    HMPV Not Detected 01/24/2021    HRVS Negative 01/24/2021    ADVBE Negative 01/24/2021    ADVC Negative 01/24/2021    ADVC Negative 12/23/2020    ADVC Negative 10/07/2019       Historical CMV results (last 3 of prior testing):  Lab Results   Component Value Date    CMVQNT <137 (A) 01/25/2021    CMVQNT 238 (A) 01/24/2021    CMVQNT 675 (A)  12/23/2020     Lab Results   Component Value Date    CMVLOG <2.1 01/25/2021    CMVLOG 2.4 (H) 01/24/2021    CMVLOG 2.8 (H) 12/23/2020       Urine Studies    Recent Labs   Lab Test 01/24/21  1729 10/21/19  2240   URINEPH 5.0 5.0   NITRITE Negative Negative   LEUKEST Moderate* Large*   WBCU 34* 115*       Most Recent Breeze Pulmonary Function Testing (FVC/FEV1 only)  FVC-Pre   Date Value Ref Range Status   12/09/2020 1.12 L    09/09/2020 1.56 L    03/09/2020 1.57 L    02/19/2020 1.78 L      FVC-%Pred-Pre   Date Value Ref Range Status   12/09/2020 34 %    09/09/2020 47 %    03/09/2020 48 %    02/19/2020 54 %      FEV1-Pre   Date Value Ref Range Status   12/09/2020 0.98 L    09/09/2020 1.10 L    03/09/2020 0.96 L    02/19/2020 0.99 L      FEV1-%Pred-Pre   Date Value Ref Range Status   12/09/2020 37 %    09/09/2020 42 %    03/09/2020 37 %    02/19/2020 38 %        IMAGING    Recent Results (from the past 48 hour(s))   XR Feeding Tube Placement    Narrative    Feeding tube placement, 2/2/2021.    Comparison: none.    History: Feeding tube needed for nutrition.     Fluoroscopy time:  1.6 minute[s]    Technique: After injection of lidocaine gel into the left nostril,  previously placed feeding tube was advanced under fluoroscopic  guidance. Guidewire was reinserted under fluoroscopy. Patient was  given single dose of 10 mg IV Reglan, previously ordered as PRN  medication from primary team for feeding tube placement.    Findings: The feeding tube is advanced with the tip in the expected  location of the distal (fourth) portion of the duodenum. A small  amount of barium and water was injected to define anatomy. Guidewire  was retracted stored on patient white board. Feeding tube was flushed  thoroughly with water. Feeding tube was secured with previously placed  nasal bridle.      Impression    Impression: Uncomplicated feeding tube placement with tip in the  expected location of the distal (fourth) portion of the  duodenum.    I, SUNITA ADKINS MD, attest that I was immediately available to  provide guidance and assistance during the entirety of the procedure.  The examination was performed portable in the ICU therefore a  radiologist was not directly present during tube placement.    I have personally reviewed the examination and initial interpretation  and I agree with the findings.    SUNITA ADKINS MD   XR Chest Port 1 View    Narrative    EXAM: XR CHEST PORT 1 VW  2/3/2021 1:30 AM     HISTORY:  worsening hypoxic respiratory failure       COMPARISON:  Chest x-ray 2/1/2021.    FINDINGS:   Portable upright radiograph of the chest. Right internal jugular  central venous catheter projects over the superior cavoatrial  junction. Feeding tube courses below the diaphragm and into the  stomach with the tip collimated out of the field of view. Postsurgical  changes of bilateral lung transplant with mediastinal clips and intact  median sternotomy wires. Trachea is midline. Cardiac silhouette is  largely obscured. No significant change in the diffuse mixed  interstitial and airspace opacities, most pronounced in the bilateral  lower lobes. Likely bilateral pleural effusions. No pneumothorax.    There is some contrast noted within the colon in the upper abdomen,  presumably from feeding tube placement 2/2/2021.      Impression    IMPRESSION:   1. Unchanged diffuse bilateral interstitial and airspace opacities,  with lower lung predominance.  2. Probable bilateral pleural effusions.    I have personally reviewed the examination and initial interpretation  and I agree with the findings.    NITHYA GUIDO MD   XR Chest Port 1 View    Narrative    EXAM: XR CHEST PORT 1 VW  2/3/2021 2:14 AM     HISTORY:  s/p intubation       COMPARISON:  Chest x-ray from same date at 0124 hours    FINDINGS:   Portable supine radiograph of the chest. Endotracheal tube tip  projects over the low thoracic trachea approximately 2.4 cm above the  level of  the gee. Right internal jugular central venous catheter  projects over the superior cavoatrial junction. Feeding tube courses  below the diaphragm and into the stomach with the tip collimated out  of the field of view. Postsurgical changes of bilateral lung  transplant with mediastinal clips and intact median sternotomy wires.  Trachea is midline. Cardiac silhouette is largely obscured. No  significant change in the diffuse mixed interstitial and airspace  opacities, most pronounced in the bilateral lower lobes. Likely  bilateral pleural effusions. No pneumothorax.      Impression    IMPRESSION:   1. Endotracheal tube tip projects over the low thoracic trachea,  approximately 2.4 cm above the level of the gee.  2. Unchanged diffuse mixed interstitial and airspace opacities.    I have personally reviewed the examination and initial interpretation  and I agree with the findings.    NITHYA GUIDO MD   XR Abdomen Port 1 View    Narrative    EXAM: XR ABDOMEN PORT 1 VW  2/3/2021 2:30 AM     HISTORY:  s/p intubation for placement of NJ       COMPARISON:  Abdominal radiograph 1/26/2021    FINDINGS:   Portable semiupright view of the abdomen. Feeding tube tip projects at  the expected location of the duodenal jejunal junction. Partial  contrast opacification of loops of bowel in the abdomen from prior  feeding tube placement on 2/2/2021. Nonobstructive bowel gas pattern.  No evidence of pneumatosis. Diffuse interstitial opacities in the  visualized lung bases.    Pression:  1. Feeding tube tip projects over the duodenal jejunal junction.  2. Nonobstructive bowel gas pattern.    I have personally reviewed the examination and initial interpretation  and I agree with the findings.    NITHYA GUIDO MD   CT Chest w/o Contrast    Narrative    EXAMINATION: Chest CT  2/4/2021 3:49 AM    CLINICAL HISTORY: Pneumonia, effusion or abscess suspected.    COMPARISON: CT chest 1/27/2021. CT chest 12/9/2020.    TECHNIQUE: CT  imaging obtained through the chest without contrast.  Axial, coronal, and sagittal reconstructions and axial MIP reformatted  images are reviewed.     CONTRAST: None    FINDINGS:  Lines and tubes: Endotracheal tube tip terminates in the low thoracic  trachea. Right internal jugular Central venous catheter tip terminates  in the distal SVC. Enteric tube courses into the stomach.    Lungs: Postsurgical changes of bilateral lung transplant. Fluid-filled  upper trachea. Central tracheobronchial tree is otherwise patent.  Trace bilateral pleural effusions. Diffuse groundglass opacification  with interlobular septal thickening throughout the bilateral lung  fields with large confluent areas of consolidation, most pronounced in  the bilateral lower lobes. Overall the degree of consolidation has  increased from prior exam on 1/27/2021. No significant change in the  peripherally calcified chronic empyema in the left lung base.  Intrapleural amphotericin bead noted in the left lung base.    Mediastinum: The visualized thyroid is unremarkable. Cardiac size is  enlarged. No significant pericardial effusion. Normal caliber of the  aorta and main pulmonary artery. Normal branching pattern of the  aortic arch. No significant coronary artery calcium. Mild  atherosclerotic changes of the thoracic aorta. Scattered prominent  mediastinal lymph nodes, not enlarged by size criteria. Esophagus is  normal in caliber.    Bones and soft tissues: Degenerative changes of the spine. Stable  anterior wedge compression deformity of the T5 vertebral body. Old  right sixth rib fracture. Intact median sternotomy wires. No  suspicious osseous lesions. Mild diffuse anasarca.    Upper Abdomen: Limited evaluation of the upper abdomen is within  normal limits.      Impression    IMPRESSION:   1.  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 Overall the degree  of consolidation has  progressed compared to the prior CT on 1/27/2021.  Cannot exclude superimposed infection.  2. No significant change in the chronic empyema in the left lower lobe  status post intrapleural amphotericin B placement.    I have personally reviewed the examination and initial interpretation  and I agree with the findings.    MEHNAZ CHAPMAN MD

## 2021-02-04 NOTE — PROCEDURES
PROCEDURE:   Ultrasound guided; Right brachial artery line placement.     INDICATION: Blood pressure monitoring, shock    PROCEDURE :   Amie James MD    SUPERVISOR:  Dr. James    CONSENT: Obtained and in the paper chart    UNIVERSAL PROTOCOL: Patient Identification was verified, time out was performed, site marking was done. Imaging data reviewed. Full Barrier precaution done: Hands washed, mask, gloves, gown, cap and eye protection all used.     PROCEDURE SUMMARY:   The patient was prepped and draped in the usual sterile manner using chlorhexidine scrub. 1% lidocaine was used to numb the region.     The right brachial artery was palpated and visualized on ultrasound.  The artery was successfully cannulated on the first pass. Pulsatile, arterial blood was visualized and the artery was then threaded with a guide wire using the Seldinger technique.  The needle was removed and ultrasound visualized the wire in the artery, a catheter was threaded over the wire, the wire was removed, and the catheter was sutured into place. A good wave-form was obtained. The patient tolerated the procedure well without any immediate complications. The area was cleaned and a dressing was applied.     Dr. James was available for the key portions of the procedure.    ESTIMATED BLOOD LOSS: Minimal    Amie James MD  Critical Care Fellow

## 2021-02-04 NOTE — PROGRESS NOTES
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 She was transferred from OSH ICU 1/22 and intubated and requiring vasopressors. Found to have PCP pneumonia.     CHANGES and MAJOR THINGS TODAY:   - 2/4 CT chest which displayed worsening consolidations consistent with progression of pneumocystis with the possibility of superimposed secondary pneumonia.  - Will discontinue vancomycin given negative MRSA nares   - Will talk to renal about plan for dialysis, concern for hypervolemia, may need dialysis line  - Will replace a-line  - Will discuss with family regarding interest in a care conference, had been contemplating   - Plan to place patient in prone today following needed interventions    PLAN:     Neuro:  # Pain and sedation  - Continue propofol, fentanyl, and versed ggts, attempt to wean prop as tolerated  - Fentanyl and versed PRNs ordered    #Insomnia  - Melatonin     #Unequal pupils  Patient with alternating fixed and dilated pupils. CTH unremarkable. Neuro exam otherwise unremarkable  - Optho consulted, no acute concerns     Pulmonary:  #Acute Hypoxic Respiratory Failure,  Suspected 2/2 PJP initially. OSH CT 1/23 + bilateral ground glass opacities and consolidative lung infiltrates centrally distributed to the upper lobes and RLL, . Covid-19 negative X3 at OSH. Given acute and severe decompensation, concern for secondary pneumonia. 2/4 CT chest which displayed worsening consolidations consistent with progression of pneumocystis with the possibility of  superimposed secondary pneumonia.  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  - Positive PJP PCR on 1/28 with positive fungitel   - Continue PTA tacrolimus  - Prednisone taper, holding home dose for now, may consider stress dosing steroids  - Pulmonary lung transplant team consulted and following, appreciate recs     Cardiovascular:  #Shock  #Hx Hypertension  Patient ith hypotension likely related to septic shock with some contribution of propofol. Last ECHO 10/21/20 with EF 60-65%, normal LV & RV function.   - A-line lost overnight, will replace  - Continue levo and vaso ggt  - Hold PTA coreg and amlodipine     # Atrial fibrillation w/ RVR  Patient with pAF with rates into 130-40s. Likely in the setting of acute pulmoary illness. HR now < 100 following intubation.     GI/Nutrition:  # Portal HTN  Liver biopsy positive for congestive hepatopathy. Previous had ascites thought to be r/t elevated right sided heart pressures. Last saw GI on 12/21/20 and patient endorsed that her ascites is no longer present.   - NTD     # Nutrition  - NJT placed by radKATHERINE rivas goal rate of 40    # Constipation  Patient reportedly has history of constipation with bactrim use, now stooling  - Scheduled miralax ordered  - PRN dulcolax and senna-doc  ordered     Renal/Fluids/Electrolytes:  # ESRD on iHD twice weekly (M/Th).   # Anion gap metabolic acidosis r/t ESRD, uremia- Resolved  Outpatient dry body weight 56.5 kg. Ran dialysis 1/26, 1/28, 1/30  - Nephrology Consulted   - Continued dialysis per neph recs, will discuss plan for dialysis today as patient will need fluid removal   - I&O  - Daily Weights   - Trend BMP     # Hypokalemia  - Dialysis as above     Endocrine:  # Seronegative RA with Raynaud's   - NTD, monitor   # Hyperglycemia, steroid induced  - High intensity sliding scale insulin  - 10 units glargine daily     ID:  # Sepsis with Shock  Suspected related to PCP pneumonia. OSH CT 1/23 + bilateral ground glass opacities and consolidative lung infiltrates centrally distributed to the upper lobes and RLL. Covid-19 negative X3 at OSH. Procalcitonin negative. Urine strep, CMV, and RPP negative. Given acute and severe decompensation, concern for secondary pneumonia. 2/4 CT chest which displayed worsening consolidations consistent with progression of pneumocystis with the possibility of superimposed secondary pneumonia.  - PJP PCR positive, fungitell positive as well  - Continue treatment with bactrim, prednisone taper  - Continue zosyn, will stop vanc as MRSA nares negative  - Patient respiratory status too tenuous for bronchoscopy, will reconsider as able  - s/p thora 1/25, fluid exudative, NGTD  - s/p 7 day course of empiric ceftriaxone      # Chronic/Stable right aspergillus empyema   - Continue PTA posaconazole, will monitor QTc     Hematology:    # Anemia r/t renal disease, chronic stable   # Thrombocytopenia r/t critical illness, resolved   # Leukocytosis, infection/steroids  Takes aranesp 25 mcg every four weeks, last dose 01/14.    - Daily CBC     Musculoskeletal:  #Weakness/Deconditioning of critical illness  - PT/OT consulted     Skin:  # Dermatomyositis antitryptase syndrome   - Noted, NTD     General Cares/Prophylaxis:    DVT Prophylaxis:  Heparin SQ  GI Prophylaxis: PPI  Restraints: Not indicated  Family Communication:  updated at bedside  Code Status: Full      Lines/tubes/drains:  - HD fistula left forearm  - CVC RIJ  - PIV  - Will replace A-line  - NJT  - Rectal pouch  - ETT     Disposition:  - Medical ICU    Patient seen and findings/plan discussed with medical ICU staff, Dr. James.    Tomas Rolle     ====================================  INTERVAL HISTORY:   Overnight patient was placed in supine position at ~11 PM. She initially required increased oxygen after this position change. She then went to CT scan for a CT chest which displayed worsening consolidations consistent with progression of pneumocystis with the possibility of superimposed secondary pneumonia. During the scan the patient lost her A-line. This AM the patient remains intubated and sedated in the supine position.     OBJECTIVE:   1. VITAL SIGNS:   Temp:  [97.6  F (36.4  C)-98.9  F (37.2  C)] 98  F (36.7  C)  Pulse:  [] 105  Resp:  [9-31] 21  BP: (115-131)/(61-76) 125/61  MAP:  [61 mmHg-87 mmHg] 64 mmHg  Arterial Line BP: ()/(10-69) 91/41  FiO2 (%):  [60 %-100 %] 80 %  SpO2:  [89 %-98 %] 94 %  Ventilation Mode: CMV/AC  (Continuous Mandatory Ventilation/ Assist Control)  FiO2 (%): 80 %  Rate Set (breaths/minute): 24 breaths/min  Tidal Volume Set (mL): 320 mL  PEEP (cm H2O): 8 cmH2O  Oxygen Concentration (%): 80 %  Resp: 21    2. INTAKE/ OUTPUT:   I/O last 3 completed shifts:  In: 3195.06 [I.V.:1945.06; NG/GT:330]  Out: 50 [Stool:50]    3. PHYSICAL EXAMINATION:  General: Laying in bed. No acute distress. Intubated, sedated, supine position.  Neuro: Sedated, no notable clonus  Pulm/Resp: Worsening diffuse coarse breath sounds, end expiratory wheezing  CV: Pulses intact, irregularly irregular   Abdomen: Non distended, NBS  Incisions/Skin: Left AV fistula with thrill and bruit    4. LABS:   Arterial Blood Gases   Recent Labs   Lab 02/04/21  0305 02/03/21  2350  02/03/21  1210 02/03/21  0602   PH 7.35 7.35 7.39 7.44   PCO2 42 43 40 36   PO2 61* 62* 83 165*   HCO3 23 23 24 25     Complete Blood Count   Recent Labs   Lab 02/04/21  0310 02/03/21  0415 02/02/21  0435 02/01/21  0424   WBC 17.3* 21.7* 10.5 10.5   HGB 8.9* 10.0* 10.0* 10.5*    290 259 275     Basic Metabolic Panel  Recent Labs   Lab 02/04/21  0310 02/03/21  0415 02/02/21  0435 02/01/21  0424    133 134 134   POTASSIUM 3.3* 3.3* 3.9 3.3*   CHLORIDE 100 98 100 99   CO2 23 25 26 28   BUN 68* 57* 35* 55*   CR 3.84* 3.45* 2.63* 3.76*   * 138* 187* 184*     Liver Function Tests  Recent Labs   Lab 02/04/21  0310 01/29/21  0416   AST 9 9   ALT 33 21   ALKPHOS 174* 135   BILITOTAL 0.4 0.5   ALBUMIN 1.9* 1.9*     Coagulation Profile  No lab results found in last 7 days.    5. RADIOLOGY:   Recent Results (from the past 24 hour(s))   CT Chest w/o Contrast    Narrative    EXAMINATION: Chest CT  2/4/2021 3:49 AM    CLINICAL HISTORY: Pneumonia, effusion or abscess suspected.    COMPARISON: CT chest 1/27/2021. CT chest 12/9/2020.    TECHNIQUE: CT imaging obtained through the chest without contrast.  Axial, coronal, and sagittal reconstructions and axial MIP reformatted  images are reviewed.     CONTRAST: None    FINDINGS:  Lines and tubes: Endotracheal tube tip terminates in the low thoracic  trachea. Right internal jugular Central venous catheter tip terminates  in the distal SVC. Enteric tube courses into the stomach.    Lungs: Postsurgical changes of bilateral lung transplant. Fluid-filled  upper trachea. Central tracheobronchial tree is otherwise patent.  Trace bilateral pleural effusions. Diffuse groundglass opacification  with interlobular septal thickening throughout the bilateral lung  fields with large confluent areas of consolidation, most pronounced in  the bilateral lower lobes. Overall the degree of consolidation has  increased from prior exam on 1/27/2021. No significant change in  the  peripherally calcified chronic empyema in the left lung base.  Intrapleural amphotericin bead noted in the left lung base.    Mediastinum: The visualized thyroid is unremarkable. Cardiac size is  enlarged. No significant pericardial effusion. Normal caliber of the  aorta and main pulmonary artery. Normal branching pattern of the  aortic arch. No significant coronary artery calcium. Mild  atherosclerotic changes of the thoracic aorta. Scattered prominent  mediastinal lymph nodes, not enlarged by size criteria. Esophagus is  normal in caliber.    Bones and soft tissues: Degenerative changes of the spine. Stable  anterior wedge compression deformity of the T5 vertebral body. Old  right sixth rib fracture. Intact median sternotomy wires. No  suspicious osseous lesions. Mild diffuse anasarca.    Upper Abdomen: Limited evaluation of the upper abdomen is within  normal limits.      Impression    IMPRESSION:   1.  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 Overall the degree  of consolidation has progressed compared to the prior CT on 1/27/2021.  Cannot exclude superimposed infection.  2. No significant change in the chronic empyema in the left lower lobe  status post intrapleural amphotericin B placement.    I have personally reviewed the examination and initial interpretation  and I agree with the findings.    MEHNAZ CHAPMAN MD

## 2021-02-04 NOTE — PLAN OF CARE
ICU End of Shift Summary. See flowsheets for vital signs and detailed assessment.    Changes this shift:   Pt was placed supine at 2300.  Decreased sats initially, but gradually able to wean FiO2 to 60% for a short time.  Pt then went to CT at approx 0300, upon return from there sats decreased to 88-90%  increased FiO2 to 100% .  Very gradually weaning.  A-Line wave form was lost after pt. Had arms above head for CT scan.  MD aware and attempting to place a new one.  Currently obtaining B/P's from right lower extremity.       Plan:   Possible bronch.  Possible care conference.  Possible dialysis. Continue supportive cares.

## 2021-02-04 NOTE — PROGRESS NOTES
"CRRT INITIATION NOTE    Consent for CRRT Completed:  YES  Patient s Vascular Access: Catheter              Placement Confirmed: YES  Manufacture: [employer]  Model:  Zenaida  Length/Arabic Size:  11.5 Fr X 24 cm  Flush Volume:  1.4/1.5    DATA:  Procedure:  CVVHDF  Start Time:  1232  Machine#:  [id number]  Filter:  M150  Blood Flow:  200  ML/min  Pre-Replacement Solution:  Phoxillum BK 4/2.5  Post-Replacement Solution:  Phoxillum BK 4/2.5  Dialysate Solution:Phoxillum BK 4/2.5   Pre-Replacement Solution Rate:  700 mL/hr  Post-Replacement Solution Rate:  200 mL/hr  Dialysate Flow Rate:  700 mL/hr   Patient Removal Rate:  0 mL/hr  Anticoagulation Type and Rate:  NS  0    ASSESSMENT:  How Patient Tolerated Initiation:   Vital Signs:  /59   Pulse 81   Temp 97.1  F (36.2  C) (Axillary)   Resp 24   Ht 1.6 m (5' 2.99\")   Wt 60.7 kg (133 lb 13.1 oz)   LMP 06/07/2014 (Exact Date)   SpO2 97%   BMI 23.71 kg/m    Initial Pressures:  Access:  -46  Filter:  102  Return:  66  TMP:  49  Change in Filter Pressure:  19      INTERVENTIONS:  Initiated at 1232. Proned at 1245.    PLAN:  Continue with plan of care. Contact CRRT resource RN [place]cerns at [phone].           "

## 2021-02-04 NOTE — PROGRESS NOTES
CLINICAL NUTRITION SERVICES - REASSESSMENT NOTE     Nutrition Prescription    RECOMMENDATIONS FOR MDs/PROVIDERS TO ORDER:  Please ensure that 100% of goal TF volume is infused daily, 840 mL.     Malnutrition Status:    Severe malnutrition in the context of chronic illness.     Recommendations already ordered by Registered Dietitian (RD):  Changing to standard, concentrated regimen- Nutren 1.5 @ 45 mL/hr to provide 1620 kcals (27 kcal/kg), 73 g PRO (1.2 g/kg), 821 mL H2O, 190 g CHO and no Fiber daily.    Adding 2 pkt Prosource daily to TF regimen to bring total provisions to 1700 kcal (29 kcal/kg) and 95 g PRO (1.6 g PRO/kg) d/t possible CRRT start.       EVALUATION OF THE PROGRESS TOWARD GOALS   Diet: NPO  Nutrition Support: Novasource Renal @ 35 ml/hr via NDT = 1680 kcal (28 kcal/kg), 76 g protein (1.3 g/kg), 154 g CHO, 602 mL water, and 0 g fiber daily.   Intake: Pt received an avg of 57% of her daily goal TF volume over the past 7 days. This led to underfeeding, with an avg intake of 16 kcal/kg and 0.7 g PRO/kg daily. Kcal and protein needs were not met this past week.     NEW FINDINGS   Pt is now being proned.  Weight loss of 5% over the past week (3.4 kg) over the past 8 days.   Multiple issues with lack of post pyloric enteral access led to underfeeding and malnutrition. Pt has had her FT replaced x 4 by radiologist under fluoro since admission.    Protein goals:  g/day (1.5-2 g/kg) for dialysis.    K+ is low today at 3.3, phosphorus low at 2.4    MALNUTRITION  % Intake: < 75% for > 7 days   % Weight Loss: > 2% in ~1 week   Subcutaneous Fat Loss: Facial region: moderate  Muscle Loss: Temporal:  moderate, Facial & jaw region: moderate, Scapular bone: moderate Thoracic region (clavicle, acromium bone, deltoid, trapezius, pectoral): moderate, Upper arm (bicep, tricep): moderate, Lower arm (forearm): mild and Posterior calf: moderate  Fluid Accumulation/Edema: Mild, generalized +2 edema  Malnutrition  Diagnosis: Severe malnutrition in the context of chronic illness.     Previous Goals   Total avg nutritional intake to meet a minimum of 25 kcal/kg and 1.2 g PRO/kg daily (per dosing wt 59 kg).  Evaluation: Not met    Previous Nutrition Diagnosis  Inadequate protein-energy intake  Evaluation: Declining    CURRENT NUTRITION DIAGNOSIS  Inadequate protein-energy intake related to inadequate intake from enteral nutrition infusion as evidenced by pt received an avg of 57% of her daily goal TF volume over the past 7 days, pt with 5% weight loss over the past 8 days, and pt with increased muscle loss on physical exam.    INTERVENTIONS  Implementation  Collaboration with other providers- discussed nutrition POC on MICU team rounds.  Medical food supplement therapy- starting Prosource to help meet higher end of protein goals.    Goals  Total avg nutritional intake to meet a minimum of 25 kcal/kg and 1.2 g PRO/kg daily (per dosing wt 59 kg).    Monitoring/Evaluation  Progress toward goals will be monitored and evaluated per protocol.    Katie Sewell, AMALIA, LD  ([facility] diet[facility], [phone] (Mon-Fri))

## 2021-02-04 NOTE — CONSULTS
Care Management - Conference planning    Length of Stay (days): 11    Expected Discharge Date: TBD     Concerns to be Addressed: Goals of care       Patient plan of care discussed at interdisciplinary rounds: Yes    Anticipated Discharge Disposition:  TBD     Education Provided on the Conference Plan:  Spoke with pt's  Ranjan today. I provided him with conference call in details, date and timing of conference. Ranjan will pass along details to other family members.     Family in Agreement with the Plan:  yes    Coordination by RNCC:  Conference line secured, room obtained on the day of the conference. MICU, Pulm transplant team including SW and Renal team updated with conference plan.     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 She was admitted to OSH 1/22 for acute hypoxemic respiratory failure and new lung opacities. She was transferred from OSH ICU 1/22, intubated and requiring vasopressors. She was found to have PCP pneumonia.  MICU team is requesting care conference for medical update and to review goals of care.     Care conference scheduled for Friday 02/05 at 1:00 pm in 4C conference room. Pre-conference for providers will start at 12:45pm.     Wilian Osorio RN, BSN  ICU Care Coordinator  Pager: [phone]  Phone:  [phone]

## 2021-02-04 NOTE — PROCEDURES
[facility]     Central line    Date/Time: 2/4/2021 11:43 AM  Performed by: Tomas Rolle MD  Authorized by: Tomas Rolle MD   Indications: dialysis.    UNIVERSAL PROTOCOL   Site Marked: Yes  Prior Images Obtained and Reviewed:  NA  Required items: Required blood products, implants, devices and special equipment available    Patient identity confirmed:  Arm band, provided demographic data and hospital-assigned identification number  Patient was reevaluated immediately before administering moderate or deep sedation or anesthesia  Confirmation Checklist:  Patient's identity using two indicators, relevant allergies, procedure was appropriate and matched the consent or emergent situation and correct equipment/implants were available  Time out: Immediately prior to the procedure a time out was called    Universal Protocol: the Joint Commission Universal Protocol was followed    Preparation: Patient was prepped and draped in usual sterile fashion    ESBL (mL):  27         ANESTHESIA    Local Anesthetic: Lidocaine 1% without epinephrine      SEDATION    Patient Sedated: No      Preparation: skin prepped with ChloraPrep  Skin prep agent dried: skin prep agent completely dried prior to procedure  Sterile barriers: all five maximum sterile barriers used - cap, mask, sterile gown, sterile gloves, and large sterile sheet  Hand hygiene: hand hygiene performed prior to central venous catheter insertion  Site selection rationale: RIJ in place, LIJ caliber too small  Patient position: flat  Catheter type: double lumen  Catheter size: 11.5.  Pre-procedure: landmarks identified  Ultrasound guidance: yes  Sterile ultrasound techniques: sterile gel and sterile probe covers were used  Number of attempts: 1  Successful placement: yes  Post-procedure: line sutured and dressing applied  Assessment: blood return through all ports and free fluid flow    PROCEDURE   Patient Tolerance:  Patient  tolerated the procedure well with no immediate complications    Length of time physician/provider present for 1:1 monitoring during sedation: 20

## 2021-02-05 LAB
1,3 BETA GLUCAN SER-MCNC: 254 PG/ML
ALBUMIN SERPL-MCNC: 1.9 G/DL (ref 3.4–5)
ALP SERPL-CCNC: 146 U/L (ref 40–150)
ALT SERPL W P-5'-P-CCNC: 28 U/L (ref 0–50)
ANION GAP SERPL CALCULATED.3IONS-SCNC: 6 MMOL/L (ref 3–14)
ANION GAP SERPL CALCULATED.3IONS-SCNC: 8 MMOL/L (ref 3–14)
ANION GAP SERPL CALCULATED.3IONS-SCNC: 8 MMOL/L (ref 3–14)
ANION GAP SERPL CALCULATED.3IONS-SCNC: 9 MMOL/L (ref 3–14)
AST SERPL W P-5'-P-CCNC: 6 U/L (ref 0–45)
B-D GLUCAN INTERPRETATION (1,3): POSITIVE
BACTERIA SPEC CULT: NO GROWTH
BASE DEFICIT BLDA-SCNC: 0.7 MMOL/L
BASE DEFICIT BLDA-SCNC: 1.5 MMOL/L
BASE DEFICIT BLDA-SCNC: 2.5 MMOL/L
BILIRUB SERPL-MCNC: 0.4 MG/DL (ref 0.2–1.3)
BUN SERPL-MCNC: 34 MG/DL (ref 7–30)
BUN SERPL-MCNC: 38 MG/DL (ref 7–30)
BUN SERPL-MCNC: 41 MG/DL (ref 7–30)
BUN SERPL-MCNC: 46 MG/DL (ref 7–30)
CA-I BLD-MCNC: 4.6 MG/DL (ref 4.4–5.2)
CA-I SERPL ISE-MCNC: 4.4 MG/DL (ref 4.4–5.2)
CA-I SERPL ISE-MCNC: 4.4 MG/DL (ref 4.4–5.2)
CA-I SERPL ISE-MCNC: 4.5 MG/DL (ref 4.4–5.2)
CALCIUM SERPL-MCNC: 7.8 MG/DL (ref 8.5–10.1)
CALCIUM SERPL-MCNC: 8 MG/DL (ref 8.5–10.1)
CALCIUM SERPL-MCNC: 8 MG/DL (ref 8.5–10.1)
CALCIUM SERPL-MCNC: 8.2 MG/DL (ref 8.5–10.1)
CHLORIDE SERPL-SCNC: 103 MMOL/L (ref 94–109)
CHLORIDE SERPL-SCNC: 105 MMOL/L (ref 94–109)
CHLORIDE SERPL-SCNC: 105 MMOL/L (ref 94–109)
CHLORIDE SERPL-SCNC: 106 MMOL/L (ref 94–109)
CO2 SERPL-SCNC: 23 MMOL/L (ref 20–32)
CO2 SERPL-SCNC: 24 MMOL/L (ref 20–32)
CO2 SERPL-SCNC: 24 MMOL/L (ref 20–32)
CO2 SERPL-SCNC: 25 MMOL/L (ref 20–32)
CREAT SERPL-MCNC: 1.73 MG/DL (ref 0.52–1.04)
CREAT SERPL-MCNC: 1.89 MG/DL (ref 0.52–1.04)
CREAT SERPL-MCNC: 2.07 MG/DL (ref 0.52–1.04)
CREAT SERPL-MCNC: 2.42 MG/DL (ref 0.52–1.04)
ERYTHROCYTE [DISTWIDTH] IN BLOOD BY AUTOMATED COUNT: 15.8 % (ref 10–15)
GFR SERPL CREATININE-BSD FRML MDRD: 21 ML/MIN/{1.73_M2}
GFR SERPL CREATININE-BSD FRML MDRD: 26 ML/MIN/{1.73_M2}
GFR SERPL CREATININE-BSD FRML MDRD: 29 ML/MIN/{1.73_M2}
GFR SERPL CREATININE-BSD FRML MDRD: 32 ML/MIN/{1.73_M2}
GLUCOSE BLDC GLUCOMTR-MCNC: 148 MG/DL (ref 70–99)
GLUCOSE BLDC GLUCOMTR-MCNC: 150 MG/DL (ref 70–99)
GLUCOSE BLDC GLUCOMTR-MCNC: 155 MG/DL (ref 70–99)
GLUCOSE BLDC GLUCOMTR-MCNC: 172 MG/DL (ref 70–99)
GLUCOSE BLDC GLUCOMTR-MCNC: 210 MG/DL (ref 70–99)
GLUCOSE SERPL-MCNC: 161 MG/DL (ref 70–99)
GLUCOSE SERPL-MCNC: 162 MG/DL (ref 70–99)
GLUCOSE SERPL-MCNC: 162 MG/DL (ref 70–99)
GLUCOSE SERPL-MCNC: 228 MG/DL (ref 70–99)
HCO3 BLD-SCNC: 23 MMOL/L (ref 21–28)
HCO3 BLD-SCNC: 24 MMOL/L (ref 21–28)
HCO3 BLD-SCNC: 24 MMOL/L (ref 21–28)
HCT VFR BLD AUTO: 23.5 % (ref 35–47)
HGB BLD-MCNC: 7.7 G/DL (ref 11.7–15.7)
INTERPRETATION ECG - MUSE: NORMAL
Lab: NORMAL
MAGNESIUM SERPL-MCNC: 2.2 MG/DL (ref 1.6–2.3)
MAGNESIUM SERPL-MCNC: 2.3 MG/DL (ref 1.6–2.3)
MCH RBC QN AUTO: 29.5 PG (ref 26.5–33)
MCHC RBC AUTO-ENTMCNC: 32.8 G/DL (ref 31.5–36.5)
MCV RBC AUTO: 90 FL (ref 78–100)
O2/TOTAL GAS SETTING VFR VENT: 55 %
O2/TOTAL GAS SETTING VFR VENT: 60 %
O2/TOTAL GAS SETTING VFR VENT: ABNORMAL %
PCO2 BLD: 40 MM HG (ref 35–45)
PCO2 BLD: 41 MM HG (ref 35–45)
PCO2 BLD: 43 MM HG (ref 35–45)
PH BLD: 7.34 PH (ref 7.35–7.45)
PH BLD: 7.37 PH (ref 7.35–7.45)
PH BLD: 7.39 PH (ref 7.35–7.45)
PHOSPHATE SERPL-MCNC: 3.7 MG/DL (ref 2.5–4.5)
PHOSPHATE SERPL-MCNC: 4 MG/DL (ref 2.5–4.5)
PLATELET # BLD AUTO: 138 10E9/L (ref 150–450)
PO2 BLD: 74 MM HG (ref 80–105)
PO2 BLD: 81 MM HG (ref 80–105)
PO2 BLD: 97 MM HG (ref 80–105)
POTASSIUM SERPL-SCNC: 3.7 MMOL/L (ref 3.4–5.3)
POTASSIUM SERPL-SCNC: 4 MMOL/L (ref 3.4–5.3)
POTASSIUM SERPL-SCNC: 4 MMOL/L (ref 3.4–5.3)
POTASSIUM SERPL-SCNC: 4.4 MMOL/L (ref 3.4–5.3)
PROT SERPL-MCNC: 5.6 G/DL (ref 6.8–8.8)
RBC # BLD AUTO: 2.61 10E12/L (ref 3.8–5.2)
SODIUM SERPL-SCNC: 135 MMOL/L (ref 133–144)
SODIUM SERPL-SCNC: 137 MMOL/L (ref 133–144)
SODIUM SERPL-SCNC: 138 MMOL/L (ref 133–144)
SODIUM SERPL-SCNC: 138 MMOL/L (ref 133–144)
SPECIMEN SOURCE: NORMAL
WBC # BLD AUTO: 10.8 10E9/L (ref 4–11)

## 2021-02-05 PROCEDURE — 90947 DIALYSIS REPEATED EVAL: CPT

## 2021-02-05 PROCEDURE — 250N000011 HC RX IP 250 OP 636: Performed by: NURSE PRACTITIONER

## 2021-02-05 PROCEDURE — 250N000009 HC RX 250: Performed by: STUDENT IN AN ORGANIZED HEALTH CARE EDUCATION/TRAINING PROGRAM

## 2021-02-05 PROCEDURE — 94668 MNPJ CHEST WALL SBSQ: CPT

## 2021-02-05 PROCEDURE — 250N000011 HC RX IP 250 OP 636: Performed by: STUDENT IN AN ORGANIZED HEALTH CARE EDUCATION/TRAINING PROGRAM

## 2021-02-05 PROCEDURE — 999N001017 HC STATISTIC GLUCOSE BY METER IP

## 2021-02-05 PROCEDURE — 250N000013 HC RX MED GY IP 250 OP 250 PS 637: Performed by: INTERNAL MEDICINE

## 2021-02-05 PROCEDURE — 250N000011 HC RX IP 250 OP 636: Performed by: ANESTHESIOLOGY

## 2021-02-05 PROCEDURE — 94003 VENT MGMT INPAT SUBQ DAY: CPT

## 2021-02-05 PROCEDURE — 82803 BLOOD GASES ANY COMBINATION: CPT | Performed by: STUDENT IN AN ORGANIZED HEALTH CARE EDUCATION/TRAINING PROGRAM

## 2021-02-05 PROCEDURE — 999N000015 HC STATISTIC ARTERIAL MONITORING DAILY

## 2021-02-05 PROCEDURE — 99291 CRITICAL CARE FIRST HOUR: CPT | Mod: GC | Performed by: INTERNAL MEDICINE

## 2021-02-05 PROCEDURE — 250N000011 HC RX IP 250 OP 636: Performed by: INTERNAL MEDICINE

## 2021-02-05 PROCEDURE — 258N000003 HC RX IP 258 OP 636: Performed by: NURSE PRACTITIONER

## 2021-02-05 PROCEDURE — 258N000003 HC RX IP 258 OP 636: Performed by: ANESTHESIOLOGY

## 2021-02-05 PROCEDURE — 83735 ASSAY OF MAGNESIUM: CPT | Performed by: CLINICAL NURSE SPECIALIST

## 2021-02-05 PROCEDURE — 250N000012 HC RX MED GY IP 250 OP 636 PS 637: Performed by: NURSE PRACTITIONER

## 2021-02-05 PROCEDURE — 250N000013 HC RX MED GY IP 250 OP 250 PS 637: Performed by: NURSE PRACTITIONER

## 2021-02-05 PROCEDURE — 272N000078 HC NUTRITION PRODUCT INTERMEDIATE LITER

## 2021-02-05 PROCEDURE — 250N000009 HC RX 250: Performed by: NURSE PRACTITIONER

## 2021-02-05 PROCEDURE — 82330 ASSAY OF CALCIUM: CPT | Performed by: STUDENT IN AN ORGANIZED HEALTH CARE EDUCATION/TRAINING PROGRAM

## 2021-02-05 PROCEDURE — 99233 SBSQ HOSP IP/OBS HIGH 50: CPT | Performed by: INTERNAL MEDICINE

## 2021-02-05 PROCEDURE — 84100 ASSAY OF PHOSPHORUS: CPT | Performed by: CLINICAL NURSE SPECIALIST

## 2021-02-05 PROCEDURE — 200N000002 HC R&B ICU UMMC

## 2021-02-05 PROCEDURE — 999N000157 HC STATISTIC RCP TIME EA 10 MIN

## 2021-02-05 PROCEDURE — 250N000012 HC RX MED GY IP 250 OP 636 PS 637: Performed by: INTERNAL MEDICINE

## 2021-02-05 PROCEDURE — 93005 ELECTROCARDIOGRAM TRACING: CPT

## 2021-02-05 PROCEDURE — 250N000009 HC RX 250: Performed by: CLINICAL NURSE SPECIALIST

## 2021-02-05 PROCEDURE — 93010 ELECTROCARDIOGRAM REPORT: CPT | Performed by: INTERNAL MEDICINE

## 2021-02-05 PROCEDURE — 80053 COMPREHEN METABOLIC PANEL: CPT | Performed by: CLINICAL NURSE SPECIALIST

## 2021-02-05 PROCEDURE — 85027 COMPLETE CBC AUTOMATED: CPT | Performed by: CLINICAL NURSE SPECIALIST

## 2021-02-05 PROCEDURE — 80048 BASIC METABOLIC PNL TOTAL CA: CPT | Performed by: CLINICAL NURSE SPECIALIST

## 2021-02-05 PROCEDURE — 82330 ASSAY OF CALCIUM: CPT | Performed by: CLINICAL NURSE SPECIALIST

## 2021-02-05 PROCEDURE — 94645 CONT INHLJ TX EACH ADDL HOUR: CPT

## 2021-02-05 PROCEDURE — 250N000013 HC RX MED GY IP 250 OP 250 PS 637: Performed by: STUDENT IN AN ORGANIZED HEALTH CARE EDUCATION/TRAINING PROGRAM

## 2021-02-05 PROCEDURE — 94640 AIRWAY INHALATION TREATMENT: CPT | Mod: 76

## 2021-02-05 RX ORDER — POSACONAZOLE 40 MG/ML
200 SUSPENSION ORAL 3 TIMES DAILY
Status: DISCONTINUED | OUTPATIENT
Start: 2021-02-05 | End: 2021-02-09

## 2021-02-05 RX ADMIN — INSULIN ASPART 1 UNITS: 100 INJECTION, SOLUTION INTRAVENOUS; SUBCUTANEOUS at 19:48

## 2021-02-05 RX ADMIN — Medication 7 MG/HR: at 11:40

## 2021-02-05 RX ADMIN — INSULIN ASPART 1 UNITS: 100 INJECTION, SOLUTION INTRAVENOUS; SUBCUTANEOUS at 11:34

## 2021-02-05 RX ADMIN — NYSTATIN 1000000 UNITS: 500000 SUSPENSION ORAL at 15:48

## 2021-02-05 RX ADMIN — TACROLIMUS 1 MG: 5 CAPSULE ORAL at 07:55

## 2021-02-05 RX ADMIN — Medication 5 ML: at 07:55

## 2021-02-05 RX ADMIN — PIPERACILLIN SODIUM AND TAZOBACTAM SODIUM 4.5 G: 4; .5 INJECTION, POWDER, LYOPHILIZED, FOR SOLUTION INTRAVENOUS at 02:20

## 2021-02-05 RX ADMIN — PREDNISONE 40 MG: 20 TABLET ORAL at 07:55

## 2021-02-05 RX ADMIN — BUDESONIDE 0.5 MG: 0.5 INHALANT ORAL at 21:09

## 2021-02-05 RX ADMIN — NYSTATIN 1000000 UNITS: 500000 SUSPENSION ORAL at 07:55

## 2021-02-05 RX ADMIN — NYSTATIN 1000000 UNITS: 500000 SUSPENSION ORAL at 20:33

## 2021-02-05 RX ADMIN — SULFAMETHOXAZOLE AND TRIMETHOPRIM 580 MG: 200; 40 SUSPENSION ORAL at 19:56

## 2021-02-05 RX ADMIN — INSULIN ASPART 3 UNITS: 100 INJECTION, SOLUTION INTRAVENOUS; SUBCUTANEOUS at 15:48

## 2021-02-05 RX ADMIN — INSULIN ASPART 1 UNITS: 100 INJECTION, SOLUTION INTRAVENOUS; SUBCUTANEOUS at 04:08

## 2021-02-05 RX ADMIN — NYSTATIN 1000000 UNITS: 500000 SUSPENSION ORAL at 11:34

## 2021-02-05 RX ADMIN — Medication 2.4 UNITS/HR: at 01:21

## 2021-02-05 RX ADMIN — LEVALBUTEROL HYDROCHLORIDE 1.25 MG: 1.25 SOLUTION RESPIRATORY (INHALATION) at 17:05

## 2021-02-05 RX ADMIN — CALCIUM CHLORIDE, MAGNESIUM CHLORIDE, SODIUM CHLORIDE, SODIUM BICARBONATE, POTASSIUM CHLORIDE AND SODIUM PHOSPHATE DIBASIC DIHYDRATE 12.5 ML/KG/HR: 3.68; 3.05; 6.34; 3.09; .314; .187 INJECTION INTRAVENOUS at 16:56

## 2021-02-05 RX ADMIN — Medication 20 NG/KG/MIN: at 05:34

## 2021-02-05 RX ADMIN — Medication 0.05 MCG/KG/MIN: at 01:19

## 2021-02-05 RX ADMIN — CALCIUM CHLORIDE, MAGNESIUM CHLORIDE, SODIUM CHLORIDE, SODIUM BICARBONATE, POTASSIUM CHLORIDE AND SODIUM PHOSPHATE DIBASIC DIHYDRATE 12.5 ML/KG/HR: 3.68; 3.05; 6.34; 3.09; .314; .187 INJECTION INTRAVENOUS at 02:40

## 2021-02-05 RX ADMIN — Medication 20 NG/KG/MIN: at 21:34

## 2021-02-05 RX ADMIN — HEPARIN SODIUM 5000 UNITS: 5000 INJECTION, SOLUTION INTRAVENOUS; SUBCUTANEOUS at 16:11

## 2021-02-05 RX ADMIN — SULFAMETHOXAZOLE AND TRIMETHOPRIM 580 MG: 200; 40 SUSPENSION ORAL at 09:38

## 2021-02-05 RX ADMIN — CALCIUM CHLORIDE, MAGNESIUM CHLORIDE, SODIUM CHLORIDE, SODIUM BICARBONATE, POTASSIUM CHLORIDE AND SODIUM PHOSPHATE DIBASIC DIHYDRATE 12.5 ML/KG/HR: 3.68; 3.05; 6.34; 3.09; .314; .187 INJECTION INTRAVENOUS at 09:39

## 2021-02-05 RX ADMIN — POLYETHYLENE GLYCOL 3350 17 G: 17 POWDER, FOR SOLUTION ORAL at 07:55

## 2021-02-05 RX ADMIN — POSACONAZOLE 200 MG: 40 SUSPENSION ORAL at 13:58

## 2021-02-05 RX ADMIN — INSULIN ASPART 1 UNITS: 100 INJECTION, SOLUTION INTRAVENOUS; SUBCUTANEOUS at 08:14

## 2021-02-05 RX ADMIN — PROPOFOL 30 MCG/KG/MIN: 10 INJECTION, EMULSION INTRAVENOUS at 05:32

## 2021-02-05 RX ADMIN — SODIUM CHLORIDE 300 MG: 9 INJECTION, SOLUTION INTRAVENOUS at 08:01

## 2021-02-05 RX ADMIN — BUDESONIDE 0.5 MG: 0.5 INHALANT ORAL at 10:13

## 2021-02-05 RX ADMIN — TACROLIMUS 1 MG: 5 CAPSULE ORAL at 17:55

## 2021-02-05 RX ADMIN — Medication 50 MCG/HR: at 06:33

## 2021-02-05 RX ADMIN — INSULIN GLARGINE 10 UNITS: 100 INJECTION, SOLUTION SUBCUTANEOUS at 07:57

## 2021-02-05 RX ADMIN — LEVALBUTEROL HYDROCHLORIDE 1.25 MG: 1.25 SOLUTION RESPIRATORY (INHALATION) at 13:13

## 2021-02-05 RX ADMIN — PIPERACILLIN SODIUM AND TAZOBACTAM SODIUM 4.5 G: 4; .5 INJECTION, POWDER, LYOPHILIZED, FOR SOLUTION INTRAVENOUS at 13:58

## 2021-02-05 RX ADMIN — Medication 40 MG: at 07:55

## 2021-02-05 RX ADMIN — PROPOFOL 20 MCG/KG/MIN: 10 INJECTION, EMULSION INTRAVENOUS at 14:16

## 2021-02-05 RX ADMIN — LEVALBUTEROL HYDROCHLORIDE 1.25 MG: 1.25 SOLUTION RESPIRATORY (INHALATION) at 10:13

## 2021-02-05 RX ADMIN — POSACONAZOLE 200 MG: 40 SUSPENSION ORAL at 20:20

## 2021-02-05 RX ADMIN — LEVALBUTEROL HYDROCHLORIDE 1.25 MG: 1.25 SOLUTION RESPIRATORY (INHALATION) at 21:09

## 2021-02-05 RX ADMIN — PIPERACILLIN SODIUM AND TAZOBACTAM SODIUM 4.5 G: 4; .5 INJECTION, POWDER, LYOPHILIZED, FOR SOLUTION INTRAVENOUS at 08:03

## 2021-02-05 RX ADMIN — HEPARIN SODIUM 5000 UNITS: 5000 INJECTION, SOLUTION INTRAVENOUS; SUBCUTANEOUS at 07:55

## 2021-02-05 RX ADMIN — PIPERACILLIN SODIUM AND TAZOBACTAM SODIUM 4.5 G: 4; .5 INJECTION, POWDER, LYOPHILIZED, FOR SOLUTION INTRAVENOUS at 19:50

## 2021-02-05 ASSESSMENT — ACTIVITIES OF DAILY LIVING (ADL)
ADLS_ACUITY_SCORE: 20

## 2021-02-05 ASSESSMENT — MIFFLIN-ST. JEOR: SCORE: 1164

## 2021-02-05 NOTE — PROGRESS NOTES
CRRT STATUS NOTE    DATA:  Time:  5:44 AM  Pressures WNL:  YES  Filter Status:  WDL    Problems Reported/Alarms Noted:  None    Supplies Present:  YES    ASSESSMENT:  Patient Net Fluid Balance:  -258ml since midnight  Vital Signs:  Temp: 96  F (35.6  C) Temp src: Axillary BP: 120/59 Pulse: 93   Resp: 24 SpO2: 93 % O2 Device: Mechanical Ventilator      Labs:  Last Comprehensive Metabolic Panel:  Sodium   Date Value Ref Range Status   02/05/2021 135 133 - 144 mmol/L Final     Potassium   Date Value Ref Range Status   02/05/2021 3.7 3.4 - 5.3 mmol/L Final     Chloride   Date Value Ref Range Status   02/05/2021 103 94 - 109 mmol/L Final     Carbon Dioxide   Date Value Ref Range Status   02/05/2021 25 20 - 32 mmol/L Final     Anion Gap   Date Value Ref Range Status   02/05/2021 6 3 - 14 mmol/L Final     Glucose   Date Value Ref Range Status   02/05/2021 162 (H) 70 - 99 mg/dL Final     Urea Nitrogen   Date Value Ref Range Status   02/05/2021 46 (H) 7 - 30 mg/dL Final     Creatinine   Date Value Ref Range Status   02/05/2021 2.42 (H) 0.52 - 1.04 mg/dL Final     GFR Estimate   Date Value Ref Range Status   02/05/2021 21 (L) >60 mL/min/[1.73_m2] Final     Comment:     Non  GFR Calc  Starting 12/18/2018, serum creatinine based estimated GFR (eGFR) will be   calculated using the Chronic Kidney Disease Epidemiology Collaboration   (CKD-EPI) equation.       Calcium   Date Value Ref Range Status   02/05/2021 8.0 (L) 8.5 - 10.1 mg/dL Final      Lab Results   Component Value Date    WBC 10.8 02/05/2021     Lab Results   Component Value Date    RBC 2.61 02/05/2021     Lab Results   Component Value Date    HGB 7.7 02/05/2021     Lab Results   Component Value Date    HCT 23.5 02/05/2021     No components found for: MCT  Lab Results   Component Value Date    MCV 90 02/05/2021     Lab Results   Component Value Date    MCH 29.5 02/05/2021     Lab Results   Component Value Date    MCHC 32.8 02/05/2021     Lab Results    Component Value Date    RDW 15.8 02/05/2021     Lab Results   Component Value Date     02/05/2021        Goals of Therapy:  0-50 net negative    INTERVENTIONS:   None.Pt  Is proned    PLAN:  Continue to monitor closely and notify CRRT Resource RN at [phone] with any questions

## 2021-02-05 NOTE — PLAN OF CARE
ICU End of Shift Summary. See flowsheets for vital signs and detailed assessment.    Changes this shift: Sedated on fentanyl, versed and propofol. Occasionally opens eyes and nods to yes/no questions. Levo to keep MAP > 65. Vaso off this afternoon. Supine today at 10am. Weaned to 50% FiO2. Per MD - pt will stay supine overnight. Small amount of stool from rectal pouch. Miralax given. CRRT 0-50 if on 2 pressors or 0-100 if on one. Rash/redness to left arm, continue to monitor. Care conference completed today.     Plan: continue to monitor, notify team of changes.

## 2021-02-05 NOTE — PROGRESS NOTES
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  Patient was admitted to OSH on  for acute hypoxemic respiratory failure and new lung opacities. Intubated  and transferred to [facility] for ongoing management.  Extubated  but reintubated - for progressive hypoxemia.  Rapid decompensation 2/3 with ARDS presentation requiring reintubation, prone positioning, and inhaled epoprostenol.  Significant rise in leukocytosis (2/3) concerning for worsening PJP versus secondary infection.      Today's recommendations:  - Continue to follow pending cultures  - BDG fungitell pending  - Continue Bactrim and Zosyn  - Bronch deferred today, reevaluate daily  - Care conference today at 1300 for medical update and goals of care discussion  - Increase posaconazole dose if plan is to continue to treat (recommend consult with transplant ID to determine if course can be stopped given absence of Aspergillus on subsequent cultures x2)  - Repeat tacro level tomorrow      Acute hypoxemic respiratory failure:   Right post-obstructive Stenotrophomonas and Eikenella pneumonia:  PJP pneumonia:  Septic shock:   ARDS: Presented to OSH ED with increased SOB and hypoxia, initially requiring 4L NC but continued to decline and subsequently intubated .  Hemodynamic instability after intubation requiring pressors, transferred to [facility].  Afebrile but with leukocytosis.  COVID and respiratory panel negative.  PTA  bronch (12/23/20) with moderate airway obstruction and RML/RLL mucous plugging, cultures with Stenotrophomonas and Eikenella (IV ceftazidime 1/13-1/19).  OSH CT chest with new multifocal GGO bilaterally and increased left loculated pleural effusion.  Repeat bronch (1/24) with RUL BAL with thick copious secretions to RML/RLL, PJP PCR positive.  Extubated on 1/26, but reintubated from 1/27-1/29 for progressive hypoxemia.  Remained on either HFNC % FiO2 or BiPAP % FiO2, then eventually decompensated on 2/3 and again reintubated.  Working revealing for ARDS, proning protocol initiated (2/3) with inhaled epoprostenol, pressors initiated.  Significant rise in leukocytosis (2/3) concerning for worsening PJP vs secondary infection.  CT chest (2/3) with progressive consolidation (consistent with ARDS) and possible superimposed infection findings, stable LLL chronic empyema.  CRRT initiated 2/4 as below.  - Sputum culture/gram stain (2/3) NGTD  - Blood cultures (2/3) NGTD   - BDG fungitell (2/4) pending  - ABX per MICU: Bactrim (1/24) for PJP and Steno (noted 12/23) coverage, empiric Zosyn (2/3) pending cultures (s/p ceftriaxone 1/29-2/1 and Zosyn 1/24-1/29 for Eikenella coverage)  - Stress dose steroids and taper per MICU (prednisone 40 mg daily, decreased 2/2)  - Pulmonary toilet with chest physiotherapy QID and nebs: Xopenex QID, Mucomyst QID, and Pulmicort BID  - Ventilator and ARDS management per MICU  - Bronch deferred today, reevaluate daily  - Care conference today at 1300 for medical update and goals of care discussion     Aspergillus empyema (left-sided): First noted 10/8/19, negative in November and again on admission.  CT scan on 7/17/20 with increased mass-like density, likely pleural-based, in LLL area s/p needle aspiration (8/13/20) with Aspergillus fumigatus on cultures s/p intrapleural amphotericin bead placement (11/20).  Following with ID as OP.  LFTs stable on admission (ALP chronically mildly  elevated).  CT chest with increased loculated left pleural effusion s/p thoracentesis (1/25), exudative with 87% neutrophils, very high LDL (6308), cytology normal.    - AFB pleural cultures (1/25) NGTD  - PTA posaconazole, PTA plan for indefinite course; level 0.8 on 2/4 (prior level 1/26 therapeutic), recommend increasing posaconazole dose if plan is to continue to treat (recommend consult with transplant ID to determine if course can be stopped given absence of Aspergillus on subsequent cultures x2)     S/p bilateral lung transplant for ILD 2/2 CTD:   Right mainstem bronchial stenosis (s/p dilation 3/2019): Last seen in pulmonary clinic 12/2. DSA (1/25) not detected. Continued right mainstem stenosis noted with PTA bronch on 12/23/20, mild on 1/24 bronch.  - Follow up with IP as OP for monitoring of right bronchial stenosis     Immunosuppression: On 2 drug IST d/t recurrent infections  - Tacrolimus 1 mg BID (increased 2/3). Goal level 8-10.  Repeat level tomorrow (ordered)  - Chronic prednisone dose on hold with above stress dose steroids (5 mg qAM / 2.5 mg qPM)     Prophylaxis:   - Bactrim treatment dose as above, PTA had not been on PJP ppx since 7/28/20 as CD4 > 200     H/o CMV viremia: CMV D+/R+.  CMV <137 (1/25).  - VGCV for CMV ppx with stress dose steroids (1/26)      EBV viremia: Level 12/9/20 mildly elevated to 10K (log 4) from prior 3K (3.5 log) on 9/9/20, repeat (1/25/21) decreased to 5619 copies (log of 3.8).  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 Chronic iHD M/Th via AVF with partial graft.  Not able to tolerate iHD 2/3 d/t hypotension, line placed and transitioned to CRRT 2/4.  - Monitoring of tacrolimus as above  - Dialysis management per nephrology     Atrial fibrillation w/ RVR: H/o paroxysmal AF, last 10/2019.  Recurrence 1/30 with RVR (-140s), likely in the setting of acute pulmonary illness.  - Management per  "MICU    Oropharyngeal candidiasis: White tongue plaque noted 2/1.  - Nystatin QID (2/1)      We appreciate the excellent care provided by the MICU team. Recommendations communicated via in person rounding and this note. Will continue to follow along closely, please do not hesitate to call with any questions or concerns.     Patient discussed with Dr. James.    Hina Huff, DNP, APRN, CNP  Inpatient Nurse Practitioner  Pulmonary CF/Transplant     Subjective & Interval History:     Pt. remains prone this morning with fentanyl, Versed, and propofol for sedation, vent settings stable on 60% FiO2.  Scant secretions via ETT (josue/tan), moderate PO secretions (green/brown).  Continues to require two pressors.  Transitioned to CRRT yesterday, generally tolerating goal of 50 ml/hr.    Review of Systems:     ROS as above, otherwise severely limited d/t intubation and sedation    Physical Exam:     Vital signs:  Temp: 97.4  F (36.3  C) Temp src: Axillary   Pulse: 101   Resp: 28 SpO2: 95 % O2 Device: Mechanical Ventilator Oxygen Delivery: 60 LPM Height: 160 cm (5' 2.99\") Weight: 61.5 kg (135 lb 9.3 oz)  I/O:     Intake/Output Summary (Last 24 hours) at 2/5/2021 1142  Last data filed at 2/5/2021 1100  Gross per 24 hour   Intake 3195.03 ml   Output 4456 ml   Net -1260.97 ml     Constitutional: Prone positioned, in no apparent distress.   HEENT: Eyes with pink conjunctivae, anicteric. Orally intubated.  PULM: Mildly diminished bases bilaterally. Faint bibasilar crackles.  Musical inspiratory/expiratory rhonchi to bilateral upper lobes. Non-labored breathing on full vent support.  CV: 1-2+ BLE edema.   ABD: Unable to assess while prone.    MSK: Not moving extremities.   NEURO: Sedated, not conversant.   SKIN: Warm, dry. No rash on limited exam.   PSYCH: Calm.     Lines, Drains, and Devices:  Peripheral IV 02/03/21 Anterior;Right Upper forearm (Active)   Site Assessment WDL 02/05/21 0800   Line Status Infusing 02/05/21 0800 "   Dressing Intervention New dressing  02/03/21 1000   Phlebitis Scale 0-->no symptoms 02/05/21 0800   Infiltration Scale 0 02/05/21 0800   If infiltrated, was a Vesicant infusing? No 02/04/21 0400   Number of days: 2       Arterial Line 02/04/21 Brachial (Active)   Site Assessment WDL 02/05/21 0800   Line Status Pulsatile blood flow 02/05/21 0800   Arterine Line Cap Change Due 02/05/21 02/04/21 1200   Art Line Waveform Appropriate 02/05/21 0800   Art Line Interventions Flushed per protocol 02/05/21 0800   Color/Movement/Sensation Capillary refill less than 3 sec 02/05/21 0800   Line Necessity Yes, meets criteria 02/05/21 0800   Dressing Type Transparent 02/05/21 0800   Dressing Status Clean, dry, intact 02/05/21 0800   Dressing Change Due 02/07/21 02/05/21 0800   Number of days: 1       CVC Double Lumen 10/25/19 Right Internal jugular (Active)   Number of days: 469       Hemodialysis Vascular Access Arteriovenous fistula Left Arm (Active)   Site Assessment Bruit present;Thrill present 02/05/21 0800   Cannulation Needle Size 15 02/01/21 1133   Dressing Intervention Dressing changed 02/01/21 1600   Dressing Status Not applicable 02/05/21 0800   Verification by x-ray Not applicable 01/28/21 1520   Hand Off Report Yes 01/30/21 1250   Number of days: 11       CVC Double Lumen Left Femoral (Active)   Site Assessment UTV 02/05/21 0800   Dressing Type Transparent;Chlorhexidine sponge 02/04/21 1600   Dressing Status clean;intact 02/04/21 1600   Dressing Change Due 02/07/21 02/05/21 0800   Line Necessity yes, meets criteria 02/05/21 0800   Blue - Status blood return noted 02/05/21 0800   Blue - Cap Change Due 02/05/21 02/04/21 1200   Red - Status infusing 02/05/21 0800   Red - Cap Change Due 02/05/21 02/04/21 1200   Phlebitis Scale 0-->no symptoms 02/05/21 0800   Infiltration? no 02/05/21 0800   CVC Comment CRRT 02/05/21 0800   Number of days: 1       CVC TRIPLE LUMEN Right Internal jugular (Active)   Site Assessment WDL  02/05/21 0800   Dressing Type Chlorhexidine sponge;Transparent 02/05/21 0800   Dressing Status clean;dry;intact 02/05/21 0800   Dressing Intervention dressing changed 01/31/21 0400   Dressing Change Due 02/07/21 02/05/21 0800   Line Necessity yes, meets criteria 02/05/21 0800   Blue - Status infusing 02/05/21 0800   Blue - Cap Change Due 02/09/21 02/05/21 0800   Brown - Status infusing 02/05/21 0800   Brown - Cap Change Due 02/09/21 02/05/21 0800   White - Status infusing 02/05/21 0800   White - Cap Change Due 02/09/21 02/05/21 0800   Phlebitis Scale 0-->no symptoms 02/05/21 0800   Infiltration? no 02/05/21 0800   Infiltration Scale 0 02/05/21 0800   Infiltration Site Treatment Method  None 02/04/21 0400   CVC Comment CDI 02/05/21 0800   Number of days: 12     Data:     LABS    CMP:   Recent Labs   Lab 02/05/21  0945 02/05/21  0321 02/04/21  2220 02/04/21  1601 02/04/21  0310 02/03/21  0415    135 137 135 133 133   POTASSIUM 4.0 3.7 3.8 3.9 3.3* 3.3*   CHLORIDE 105 103 103 102 100 98   CO2 23 25 23 26 23 25   ANIONGAP 9 6 10 7 10 10   * 162* 197* 146* 213* 138*   BUN 41* 46* 52* 62* 68* 57*   CR 2.07* 2.42* 2.81* 3.40* 3.84* 3.45*   GFRESTIMATED 26* 21* 18* 14* 12* 14*   GFRESTBLACK 30* 25* 21* 16* 14* 16*   CHELSEY 8.0* 8.0* 7.8* 8.2* 8.0* 8.9   MAG  --  2.2  --  2.1 1.9 2.1   PHOS  --  4.0  --  3.2 2.4* 2.6   PROTTOTAL  --  5.6*  --   --  5.2*  --    ALBUMIN  --  1.9*  --   --  1.9*  --    BILITOTAL  --  0.4  --   --  0.4  --    ALKPHOS  --  146  --   --  174*  --    AST  --  6  --   --  9  --    ALT  --  28  --   --  33  --      CBC:   Recent Labs   Lab 02/05/21  0321 02/04/21  0310 02/03/21  0415 02/02/21  0435   WBC 10.8 17.3* 21.7* 10.5   RBC 2.61* 2.95* 3.45* 3.43*   HGB 7.7* 8.9* 10.0* 10.0*   HCT 23.5* 26.9* 31.3* 31.4*   MCV 90 91 91 92   MCH 29.5 30.2 29.0 29.2   MCHC 32.8 33.1 31.9 31.8   RDW 15.8* 15.1* 15.3* 15.1*   * 203 290 259       INR: No lab results found in last 7  days.    Glucose:   Recent Labs   Lab 02/05/21  1131 02/05/21  0945 02/05/21  0813 02/05/21  0326 02/05/21  0321 02/04/21  2345 02/04/21  2222 02/04/21  2220 02/04/21 2000 02/04/21  1601 02/04/21  0310 02/04/21  0310 02/03/21  0415 02/03/21  0415   GLC  --  161*  --   --  162*  --   --  197*  --  146*  --  213*  --  138*   *  --  148* 150*  --  119* 177*  --  80  --    < >  --    < >  --     < > = values in this interval not displayed.       Blood Gas:   Recent Labs   Lab 02/05/21  0326 02/04/21 2220 02/04/21  1404 01/31/21  0441 01/31/21  0441   PHV  --   --   --   --  7.43   PCO2V  --   --   --   --  41   PO2V  --   --   --   --  50*   HCO3V  --   --   --   --  27   LASHAUN  --   --   --   --  2.6   O2PER 60% 60.0 80   < > 70.0    < > = values in this interval not displayed.       Culture Data   Recent Labs   Lab 02/03/21  0941 02/03/21  0900 02/03/21  0816   CULT No growth No growth after 2 days No growth after 2 days       Virology Data:   Lab Results   Component Value Date    FLUAH1 Not Detected 01/24/2021    FLUAH3 Not Detected 01/24/2021    LT3163 Not Detected 01/24/2021    IFLUB Not Detected 01/24/2021    RSVA Not Detected 01/24/2021    RSVB Not Detected 01/24/2021    PIV1 Not Detected 01/24/2021    PIV2 Not Detected 01/24/2021    PIV3 Not Detected 01/24/2021    HMPV Not Detected 01/24/2021    HRVS Negative 01/24/2021    ADVBE Negative 01/24/2021    ADVC Negative 01/24/2021    ADVC Negative 12/23/2020    ADVC Negative 10/07/2019       Historical CMV results (last 3 of prior testing):  Lab Results   Component Value Date    CMVQNT <137 (A) 01/25/2021    CMVQNT 238 (A) 01/24/2021    CMVQNT 675 (A) 12/23/2020     Lab Results   Component Value Date    CMVLOG <2.1 01/25/2021    CMVLOG 2.4 (H) 01/24/2021    CMVLOG 2.8 (H) 12/23/2020       Urine Studies    Recent Labs   Lab Test 01/24/21  1729 10/21/19  2240   URINEPH 5.0 5.0   NITRITE Negative Negative   LEUKEST Moderate* Large*   WBCU 34* 115*       Most  Recent [place] Pulmonary Function Testing (FVC/FEV1 only)  FVC-Pre   Date Value Ref Range Status   12/09/2020 1.12 L    09/09/2020 1.56 L    03/09/2020 1.57 L    02/19/2020 1.78 L      FVC-%Pred-Pre   Date Value Ref Range Status   12/09/2020 34 %    09/09/2020 47 %    03/09/2020 48 %    02/19/2020 54 %      FEV1-Pre   Date Value Ref Range Status   12/09/2020 0.98 L    09/09/2020 1.10 L    03/09/2020 0.96 L    02/19/2020 0.99 L      FEV1-%Pred-Pre   Date Value Ref Range Status   12/09/2020 37 %    09/09/2020 42 %    03/09/2020 37 %    02/19/2020 38 %        IMAGING    Recent Results (from the past 48 hour(s))   CT Chest w/o Contrast    Narrative    EXAMINATION: Chest CT  2/4/2021 3:49 AM    CLINICAL HISTORY: Pneumonia, effusion or abscess suspected.    COMPARISON: CT chest 1/27/2021. CT chest 12/9/2020.    TECHNIQUE: CT imaging obtained through the chest without contrast.  Axial, coronal, and sagittal reconstructions and axial MIP reformatted  images are reviewed.     CONTRAST: None    FINDINGS:  Lines and tubes: Endotracheal tube tip terminates in the low thoracic  trachea. Right internal jugular Central venous catheter tip terminates  in the distal SVC. Enteric tube courses into the stomach.    Lungs: Postsurgical changes of bilateral lung transplant. Fluid-filled  upper trachea. Central tracheobronchial tree is otherwise patent.  Trace bilateral pleural effusions. Diffuse groundglass opacification  with interlobular septal thickening throughout the bilateral lung  fields with large confluent areas of consolidation, most pronounced in  the bilateral lower lobes. Overall the degree of consolidation has  increased from prior exam on 1/27/2021. No significant change in the  peripherally calcified chronic empyema in the left lung base.  Intrapleural amphotericin bead noted in the left lung base.    Mediastinum: The visualized thyroid is unremarkable. Cardiac size is  enlarged. No significant pericardial effusion. Normal  caliber of the  aorta and main pulmonary artery. Normal branching pattern of the  aortic arch. No significant coronary artery calcium. Mild  atherosclerotic changes of the thoracic aorta. Scattered prominent  mediastinal lymph nodes, not enlarged by size criteria. Esophagus is  normal in caliber.    Bones and soft tissues: Degenerative changes of the spine. Stable  anterior wedge compression deformity of the T5 vertebral body. Old  right sixth rib fracture. Intact median sternotomy wires. No  suspicious osseous lesions. Mild diffuse anasarca.    Upper Abdomen: Limited evaluation of the upper abdomen is within  normal limits.      Impression    IMPRESSION:   1.  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 Overall the degree  of consolidation has progressed compared to the prior CT on 1/27/2021.  Cannot exclude superimposed infection.  2. No significant change in the chronic empyema in the left lower lobe  status post intrapleural amphotericin B placement.    I have personally reviewed the examination and initial interpretation  and I agree with the findings.    MEHNAZ CHAPMAN MD

## 2021-02-05 NOTE — PROGRESS NOTES
"SPIRITUAL HEALTH SERVICES  [facility] ([place]) 4C  ON-CALL VISIT     REFERRAL SOURCE: Family request     Met with pt's spouse Ranjan in pt's room. Pt intubated and eyes closed.    Ranjan shared \"I released her to God a couple of days ago. I had to. I feel better, the weight is gone.\" Ranjan shares they will be having a care conference, that the family is supportive of what is best for pt. He shares pictures of their grandchildren, expresses gratitude for staff and for his sister who is also an ICU nurse and \"understands what's going on.\" Ranjan asked for prayers for pt, leaning close to pt with the words, \"I love you so much.\"     PLAN: Chaplains remain available per pt/family/staff request.     Rev. Surekha Ackerman MDiv, [facility]  Staff    Pager [phone]  * [facility] remains available 24/7 for emergent requests/referrals, either by having the switchboard page the on-call  or by entering an ASAP/STAT consult in Epic (this will also page the on-call ).*      "

## 2021-02-05 NOTE — PROGRESS NOTES
Nephrology Progress Note  02/05/2021         Kecia Blue is a 60 year old female with PMHx most significant for ILD with antisynthetase sydrome s/p BSLT 03/2018 (CMV D+/R+, EBV D+/R+) postoperatively complicated by Left mainstem bronchial stenosis s/p several dilations, left sided aspergillus empyema (10/2019), and CMV viremia who was intubated for HRF at OSH and transferred to [facility] for continued management. Nephrology consutled for dialysis needs.     Interval History :   Mrs Blue had CRRT started yesterday, appreciated team placing line to facilitate.  Have been slightly net negative since starting, on track for 0.5-1L net negative today, plan for 50cc/hr as long as she is on 2 pressors, can increase to 100cc/hr if she is stable on vasopressin only.  Had care conference today to update family, she is a bit improved over the past 24h but she is still critically ill and is at high risk for complications from immobility after 12 days in ICU.  Continuing cares, family asking appropriate questions and understands she likely will be a 3x/week dialysis patient even if her course goes well from pulm/ID standpoint.       Assessment & Recommendations:   ESRD-Runs at [place] through [facility] group.  Has atypical HD schedule of Monday and Thursday, has AVF with partial graft which has been challenging to access per RN's.  Have avoided CRRT, running 3-4x/week to aggressively treat any pulm edema portion of resp failure but now re-intubated and proned.                 -Starting CRRT with goal of 50-100cc/hr net negative as BP's allow (50cc/hr on 2 pressors, can try 100cc/hr if weaned to 1).  4k baths, standard Rx.                -Has L arm AVF/graft, somewhat challenging access.                -Appreciate team placing line today.        Volume status-Started CRRT on 2/4, net negative since starting and trying to wean off of pressors, ordered for 50cc/hr net negative, up to 100cc/hr if weaned down to one  "pressor.        Electrolytes/pH-K 4.0, bicarb 23, no acute issues but starting CRRT for volume.       Ca/phos/pth-Ca 8.0, Mg and Phos mildly up last check.      Anemia-Hgb 7.7, down from 10 in past 2 days, acute management per team.  Checked iron stores and sats 36% on 2/3.     Nutrition-Nutren TF.       Seen and discussed with Dr Gamlbe     Recommendations were communicated to primary team via verbal communication.        ADRIÁN Lo CNS  Clinical Nurse Specialist  [phone]    Review of Systems:   I reviewed the following systems:  ROS not done due to vent/sedation.     Physical Exam:   I/O last 3 completed shifts:  In: 2917.6 [I.V.:1554.6; Other:8; NG/GT:390]  Out: 3336 [Other:3136; Stool:200]   /59   Pulse 89   Temp 98.5  F (36.9  C) (Axillary)   Resp 23   Ht 1.6 m (5' 2.99\")   Wt 61.5 kg (135 lb 9.3 oz)   LMP 06/07/2014 (Exact Date)   SpO2 95%   BMI 24.02 kg/m       GENERAL APPEARANCE: Re-intubated, proned.   EYES: no scleral icterus  Endo: no moon facies  Pulmonary: Intubated, proned, equal expansion.    CV: regular rhythm, normal rate - Edema present  GI: soft, non distended  MS: no evidence of inflammation in joints, no muscle tenderness  :  Basilio present  SKIN: warm, dry, +1 edema.    NEURO: intubated and sedated.      Labs:   All labs reviewed by me  Electrolytes/Renal -   Recent Labs   Lab Test 02/05/21  0945 02/05/21  0321 02/04/21  2220 02/04/21  1601 02/04/21  0310    135 137 135 133   POTASSIUM 4.0 3.7 3.8 3.9 3.3*   CHLORIDE 105 103 103 102 100   CO2 23 25 23 26 23   BUN 41* 46* 52* 62* 68*   CR 2.07* 2.42* 2.81* 3.40* 3.84*   * 162* 197* 146* 213*   CHELSEY 8.0* 8.0* 7.8* 8.2* 8.0*   MAG  --  2.2  --  2.1 1.9   PHOS  --  4.0  --  3.2 2.4*       CBC -   Recent Labs   Lab Test 02/05/21 0321 02/04/21 0310 02/03/21  0415   WBC 10.8 17.3* 21.7*   HGB 7.7* 8.9* 10.0*   * 203 290       LFTs -   Recent Labs   Lab Test 02/05/21 0321 02/04/21 0310 " 01/29/21  0416   ALKPHOS 146 174* 135   BILITOTAL 0.4 0.4 0.5   ALT 28 33 21   AST 6 9 9   PROTTOTAL 5.6* 5.2* 5.3*   ALBUMIN 1.9* 1.9* 1.9*       Iron Panel -   Recent Labs   Lab Test 02/03/21  0415 12/13/18  1033 08/01/18  0921   IRON 51 16* 93   IRONSAT 36 7* 37   YOLA  --  302* 571*           Current Medications:    B and C vitamin Complex with folic acid  5 mL Oral or Feeding Tube Daily     budesonide  0.5 mg Nebulization BID     heparin ANTICOAGULANT  5,000 Units Subcutaneous Q8H     insulin aspart  1-12 Units Subcutaneous Q4H     insulin glargine  10 Units Subcutaneous QAM AC     levalbuterol  1.25 mg Nebulization 4x Daily     nystatin  1,000,000 Units Mouth/Throat 4x Daily     pantoprazole  40 mg Oral QAM AC     piperacillin-tazobactam  4.5 g Intravenous Q6H     polyethylene glycol  17 g Oral or Feeding Tube Daily     posaconazole  200 mg Oral TID     [Held by provider] predniSONE  2.5 mg Oral or Feeding Tube QPM     [START ON 2/7/2021] predniSONE  20 mg Oral Daily     predniSONE  40 mg Oral Daily     [Held by provider] predniSONE  5 mg Oral or Feeding Tube QAM     sulfamethoxazole-trimethoprim  580 mg Oral or Feeding Tube BID     tacrolimus  1 mg Oral or Feeding Tube BID IS     valGANciclovir  450 mg Oral Once per day on Mon Thu       dextrose       CRRT replacement solution 12.5 mL/kg/hr (02/05/21 0939)     epoprostenol (VELETRI) 20 mcg/mL in sterile water inhalation solution 20 ng/kg/min (02/05/21 1315)     fentaNYL 50 mcg/hr (02/05/21 1400)     midazolam 7 mg/hr (02/05/21 1400)     - MEDICATION INSTRUCTIONS -       norepinephrine 0.1 mcg/kg/min (02/05/21 1400)     CRRT replacement solution 3.413 mL/kg/hr (02/04/21 1214)     CRRT replacement solution 12.5 mL/kg/hr (02/05/21 0939)     propofol (DIPRIVAN) infusion 20 mcg/kg/min (02/05/21 1416)     sodium chloride 10 mL/hr at 02/03/21 0400     vasopressin Stopped (02/05/21 1400)

## 2021-02-05 NOTE — PLAN OF CARE
ICU End of Shift Summary. See flowsheets for vital signs and detailed assessment.    Changes this shift:  Pt. Proned all shift.  Have weaned FiO2 to 60%.  PaO2 in the 70's .Sats 90-96%. Lungs clear to coarse.   Very scant secretions from ETT.  Intermittently has greenish brown oral secretions .  Discussed placement of OG with MD herrmann.  Will discuss with day team.    Pulling approx 50 ml/hr with CRRT.  Pt tolerating well. Lytes WNL this am.   Levo at 0.05 mcg/kg/min and vaso @ 2.4 units.    Changed TF over to Nutren 1.5 running at 45 ml/hr.      Plan: Cont supportive cares.  Care conference at 1PM today

## 2021-02-05 NOTE — PROGRESS NOTES
Care Conference    Present:  , James Rolle, Concha Cruz form [facility].  Dr. James, Spring Schwab, and myself from the lung transplant team.  Hardy SAMPSON from nephrology.  Patient's spouse, Ranjan was in the room with the team.  The patient's 3 adult daughters, Yudelka Downey and Evelyn were on the phone.      Braden James and Erika reviewed patient's recent medical history and events of hospitalization.  (See Dr. James's note dated today. For summarization of discussion.) Hardy Tran updated on patient's renal status.  Need for CRRT and likelihood she would need to increase dialysis appts as an out pt. Should she survive.     Patients daughters asked several questions that were answered to their satisfaction.      After the care conference, I continued to meet with Ranjan and the daughters (on the phone.)  We reviewed patient's health care directive focusing on quality of life issues for patient and what level of health they thought she would find acceptable.   They agreed they would need to talk more about it as a family.  I answered questions about LOS at hospital, LTACH and other rehab should patient survive.  Estimated that she would likely be in [place] area for several weeks.      PLAN: plan was made to meet again middle of next week to evaluate situation.  Family is discussing code status and will let us know if they wish to change to DNR.

## 2021-02-05 NOTE — PLAN OF CARE
ICU End of Shift Summary. See flowsheets for vital signs and detailed assessment.    Changes this shift: A-line replaced this morning, continues on Levo & Vaso, levo weaned to 0.06. Versed/Propofol/Fentanyl & Veletri continue. Left femoral dialysis catheter placed, patient proned at 1240, CRRT started at 1300. CRRT goal 0-50/hr. Multiple issues with dialysis catheter d/t prone positioning. Unable to fully turn body d/t the pressure that this puts on the dialysis catheter. Frequent weight shifting. Tolerating fluid pull well so far. Patient largely unresponsive but does raise her eyebrow when asked if she can hear RN. Small amount of thick tan/josue secretions from ETT today, moderate amount of green PO secretions. Held off on bronch today d/t patient's tenuous status. Tube feeds continue at 40ml/hr, will switch to Nutren 1.5 at 45ml/hr when current bag empties.     Plan:  Prone position overnight. Care conference tomorrow 2/5 at 1300.    Problem: ARDS (Acute Respiratory Distress Syndrome)  Goal: Effective Oxygenation  Outcome: No Change

## 2021-02-05 NOTE — PROGRESS NOTES
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 She was transferred from OSH ICU 1/22 and intubated and requiring vasopressors. Found to have PCP pneumonia.     CHANGES and MAJOR THINGS TODAY:   - Plan to place patient in supine this AM  - Care conference today at 1 PM with family, pulm, nephro, and MICU teams  - CRRT initiated 2/4, goal net negative 1-2 L  - Continue treatment with bactrim, prednisone taper  - Continue zosyn empiric treatment    PLAN:     Neuro:  # Pain and sedation  - Continue propofol, fentanyl, and versed ggts  - Fentanyl and versed PRNs ordered    #Insomnia  - Melatonin     #Unequal pupils  Patient with alternating fixed and dilated pupils. CTH unremarkable. Neuro exam otherwise unremarkable  - Optho consulted, no acute concerns     Pulmonary:  #Acute Hypoxic Respiratory Failure,  Suspected 2/2 PJP initially. OSH CT 1/23 + bilateral ground glass opacities and consolidative lung infiltrates centrally distributed to the upper lobes and RLL, . Covid-19 negative X3 at OSH. Given acute and severe decompensation, concern for secondary pneumonia. 2/4 CT chest which displayed worsening consolidations consistent with progression of pneumocystis with the possibility of superimposed secondary pneumonia.  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  - Positive PJP PCR on 1/28 with positive fungitel   - Continue PTA tacrolimus  - Prednisone taper, holding home dose for now, may consider stress dosing steroids  - Pulmonary lung transplant team consulted and following, appreciate recs     Cardiovascular:  #Shock  #Hx Hypertension  Patient ith hypotension likely related to septic shock with some contribution of propofol. Last ECHO 10/21/20 with EF 60-65%, normal LV & RV function.   - Continue levo and vaso ggt  - Hold PTA coreg and amlodipine     # Atrial fibrillation w/ RVR  Patient with pAF with rates into 130-40s. Likely in the setting of acute pulmoary illness. HR now < 100 following intubation.     GI/Nutrition:  # Portal HTN  Liver biopsy positive for congestive hepatopathy. Previous had ascites thought to be r/t elevated right sided heart pressures. Last saw GI on 12/21/20 and patient endorsed that her ascites is no longer present.   - NTD     # Nutrition  - NJT placed by KATHERINE laws goal rate of 40    # Constipation  Patient reportedly has history of constipation with bactrim use, now stooling  - Scheduled miralax ordered  - PRN dulcolax and senna-doc ordered     Renal/Fluids/Electrolytes:  # ESRD on iHD twice weekly (M/Th).   # Anion gap metabolic acidosis r/t ESRD, uremia- Resolved  Outpatient dry body weight 56.5 kg. Ran dialysis 1/26, 1/28, 1/30  - Nephrology Consulted   - CRRT initiated 2/4, goal net negative 1-2 L  - I&O  - Daily  Weights   - Trend BMP     # Hypokalemia  - Dialysis as above     Endocrine:  # Seronegative RA with Raynaud's   - NTD, monitor   # Hyperglycemia, steroid induced  - High intensity sliding scale insulin  - 10 units glargine daily     ID:  # Sepsis with Shock  Suspected related to PCP pneumonia. OSH CT 1/23 + bilateral ground glass opacities and consolidative lung infiltrates centrally distributed to the upper lobes and RLL. Covid-19 negative X3 at OSH. Procalcitonin negative. Urine strep, CMV, and RPP negative. Given acute and severe decompensation, concern for secondary pneumonia. 2/4 CT chest which displayed worsening consolidations consistent with progression of pneumocystis with the possibility of superimposed secondary pneumonia.  - PJP PCR positive, fungitell positive as well  - Continue treatment with bactrim, prednisone taper  - Continue zosyn empiric treatment  - Patient respiratory status too tenuous for bronchoscopy, will reconsider as able  - s/p thora 1/25, fluid exudative, NGTD  - s/p 7 day course of empiric ceftriaxone      # Chronic/Stable right aspergillus empyema   - Continue PTA posaconazole, will monitor QTc  - Discussed discontinuing posa with ID, recommended not discontinuing during acute illness     Hematology:    # Anemia r/t renal disease, chronic stable   # Thrombocytopenia r/t critical illness, resolved   # Leukocytosis, infection/steroids  Takes aranesp 25 mcg every four weeks, last dose 01/14.    - Daily CBC     Musculoskeletal:  #Weakness/Deconditioning of critical illness  - PT/OT consulted     Skin:  # Dermatomyositis antitryptase syndrome   - Noted, NTD     General Cares/Prophylaxis:    DVT Prophylaxis: Heparin SQ  GI Prophylaxis: PPI  Restraints: Not indicated  Family Communication:  updated at bedside  Code Status: Full      Lines/tubes/drains:  - HD fistula left forearm  - CVC RIJ  - PIV  - Will replace A-line  - NJT  - Rectal pouch  - ETT     Disposition:  - Medical  ICU    Patient seen and findings/plan discussed with medical ICU staff, Dr. James.    Tomas Rolle     ====================================  INTERVAL HISTORY:   Patient was placed in prone 2/4 at ~1240. NAEO. This AM the patient remains intubated and sedated in the supine position.     OBJECTIVE:   1. VITAL SIGNS:   Temp:  [96  F (35.6  C)-98.1  F (36.7  C)] 97.4  F (36.3  C)  Pulse:  [] 92  Resp:  [12-29] 24  BP: (120-126)/(53-59) 120/59  MAP:  [62 mmHg-99 mmHg] 76 mmHg  Arterial Line BP: ()/(25-78) 107/52  FiO2 (%):  [60 %-80 %] 60 %  SpO2:  [90 %-98 %] 95 %  Ventilation Mode: CMV/AC  (Continuous Mandatory Ventilation/ Assist Control)  FiO2 (%): 60 %  Rate Set (breaths/minute): 24 breaths/min  Tidal Volume Set (mL): 320 mL  PEEP (cm H2O): 8 cmH2O  Oxygen Concentration (%): 60 %  Resp: 24    2. INTAKE/ OUTPUT:   I/O last 3 completed shifts:  In: 2917.6 [I.V.:1554.6; Other:8; NG/GT:390]  Out: 3336 [Other:3136; Stool:200]    3. PHYSICAL EXAMINATION:  General: Laying in bed. No acute distress. Intubated, sedated, prone position.  Neuro: Sedated, no notable clonus  Pulm/Resp: Diffuse coarse breath sounds, end expiratory wheezing, relatively unchanged  CV: Pulses intact, irregularly irregular   Abdomen: Proned  Incisions/Skin: Left AV fistula with thrill and bruit    4. LABS:   Arterial Blood Gases   Recent Labs   Lab 02/05/21  0326 02/04/21  2220 02/04/21  1404 02/04/21  0305   PH 7.34* 7.35 7.35 7.35   PCO2 43 42 42 42   PO2 74* 65* 78* 61*   HCO3 23 23 23 23     Complete Blood Count   Recent Labs   Lab 02/05/21  0321 02/04/21  0310 02/03/21  0415 02/02/21  0435   WBC 10.8 17.3* 21.7* 10.5   HGB 7.7* 8.9* 10.0* 10.0*   * 203 290 259     Basic Metabolic Panel  Recent Labs   Lab 02/05/21  0945 02/05/21  0321 02/04/21  2220 02/04/21  1601    135 137 135   POTASSIUM 4.0 3.7 3.8 3.9   CHLORIDE 105 103 103 102   CO2 23 25 23 26   BUN 41* 46* 52* 62*   CR 2.07* 2.42* 2.81* 3.40*   * 162*  197* 146*     Liver Function Tests  Recent Labs   Lab 02/05/21  0321 02/04/21  0310   AST 6 9   ALT 28 33   ALKPHOS 146 174*   BILITOTAL 0.4 0.4   ALBUMIN 1.9* 1.9*     Coagulation Profile  No lab results found in last 7 days.    5. RADIOLOGY:   No results found for this or any previous visit (from the past 24 hour(s)).

## 2021-02-06 LAB
ALBUMIN SERPL-MCNC: 1.9 G/DL (ref 3.4–5)
ALP SERPL-CCNC: 142 U/L (ref 40–150)
ALT SERPL W P-5'-P-CCNC: 32 U/L (ref 0–50)
ANION GAP SERPL CALCULATED.3IONS-SCNC: 5 MMOL/L (ref 3–14)
ANION GAP SERPL CALCULATED.3IONS-SCNC: 5 MMOL/L (ref 3–14)
ANION GAP SERPL CALCULATED.3IONS-SCNC: 7 MMOL/L (ref 3–14)
ANION GAP SERPL CALCULATED.3IONS-SCNC: 7 MMOL/L (ref 3–14)
AST SERPL W P-5'-P-CCNC: 3 U/L (ref 0–45)
BASE DEFICIT BLDA-SCNC: 0.3 MMOL/L
BASE DEFICIT BLDA-SCNC: 0.4 MMOL/L
BILIRUB SERPL-MCNC: 0.4 MG/DL (ref 0.2–1.3)
BUN SERPL-MCNC: 25 MG/DL (ref 7–30)
BUN SERPL-MCNC: 27 MG/DL (ref 7–30)
BUN SERPL-MCNC: 27 MG/DL (ref 7–30)
BUN SERPL-MCNC: 30 MG/DL (ref 7–30)
CA-I SERPL ISE-MCNC: 4.2 MG/DL (ref 4.4–5.2)
CA-I SERPL ISE-MCNC: 4.6 MG/DL (ref 4.4–5.2)
CA-I SERPL ISE-MCNC: 4.6 MG/DL (ref 4.4–5.2)
CA-I SERPL ISE-MCNC: 4.8 MG/DL (ref 4.4–5.2)
CALCIUM SERPL-MCNC: 7.7 MG/DL (ref 8.5–10.1)
CALCIUM SERPL-MCNC: 8.3 MG/DL (ref 8.5–10.1)
CALCIUM SERPL-MCNC: 8.3 MG/DL (ref 8.5–10.1)
CALCIUM SERPL-MCNC: 8.6 MG/DL (ref 8.5–10.1)
CHLORIDE SERPL-SCNC: 105 MMOL/L (ref 94–109)
CHLORIDE SERPL-SCNC: 106 MMOL/L (ref 94–109)
CHLORIDE SERPL-SCNC: 106 MMOL/L (ref 94–109)
CHLORIDE SERPL-SCNC: 108 MMOL/L (ref 94–109)
CO2 SERPL-SCNC: 24 MMOL/L (ref 20–32)
CO2 SERPL-SCNC: 24 MMOL/L (ref 20–32)
CO2 SERPL-SCNC: 25 MMOL/L (ref 20–32)
CO2 SERPL-SCNC: 26 MMOL/L (ref 20–32)
CREAT SERPL-MCNC: 1.16 MG/DL (ref 0.52–1.04)
CREAT SERPL-MCNC: 1.26 MG/DL (ref 0.52–1.04)
CREAT SERPL-MCNC: 1.35 MG/DL (ref 0.52–1.04)
CREAT SERPL-MCNC: 1.59 MG/DL (ref 0.52–1.04)
ERYTHROCYTE [DISTWIDTH] IN BLOOD BY AUTOMATED COUNT: 16.2 % (ref 10–15)
GFR SERPL CREATININE-BSD FRML MDRD: 35 ML/MIN/{1.73_M2}
GFR SERPL CREATININE-BSD FRML MDRD: 43 ML/MIN/{1.73_M2}
GFR SERPL CREATININE-BSD FRML MDRD: 47 ML/MIN/{1.73_M2}
GFR SERPL CREATININE-BSD FRML MDRD: 52 ML/MIN/{1.73_M2}
GLUCOSE BLDC GLUCOMTR-MCNC: 108 MG/DL (ref 70–99)
GLUCOSE BLDC GLUCOMTR-MCNC: 110 MG/DL (ref 70–99)
GLUCOSE BLDC GLUCOMTR-MCNC: 123 MG/DL (ref 70–99)
GLUCOSE BLDC GLUCOMTR-MCNC: 130 MG/DL (ref 70–99)
GLUCOSE BLDC GLUCOMTR-MCNC: 137 MG/DL (ref 70–99)
GLUCOSE BLDC GLUCOMTR-MCNC: 173 MG/DL (ref 70–99)
GLUCOSE BLDC GLUCOMTR-MCNC: 176 MG/DL (ref 70–99)
GLUCOSE SERPL-MCNC: 119 MG/DL (ref 70–99)
GLUCOSE SERPL-MCNC: 119 MG/DL (ref 70–99)
GLUCOSE SERPL-MCNC: 163 MG/DL (ref 70–99)
GLUCOSE SERPL-MCNC: 175 MG/DL (ref 70–99)
HCO3 BLD-SCNC: 25 MMOL/L (ref 21–28)
HCO3 BLD-SCNC: 25 MMOL/L (ref 21–28)
HCT VFR BLD AUTO: 24.9 % (ref 35–47)
HGB BLD-MCNC: 8.1 G/DL (ref 11.7–15.7)
MAGNESIUM SERPL-MCNC: 2.3 MG/DL (ref 1.6–2.3)
MAGNESIUM SERPL-MCNC: 2.5 MG/DL (ref 1.6–2.3)
MCH RBC QN AUTO: 29.9 PG (ref 26.5–33)
MCHC RBC AUTO-ENTMCNC: 32.5 G/DL (ref 31.5–36.5)
MCV RBC AUTO: 92 FL (ref 78–100)
O2/TOTAL GAS SETTING VFR VENT: 55 %
O2/TOTAL GAS SETTING VFR VENT: 65 %
PCO2 BLD: 42 MM HG (ref 35–45)
PCO2 BLD: 42 MM HG (ref 35–45)
PH BLD: 7.38 PH (ref 7.35–7.45)
PH BLD: 7.38 PH (ref 7.35–7.45)
PHOSPHATE SERPL-MCNC: 3.9 MG/DL (ref 2.5–4.5)
PHOSPHATE SERPL-MCNC: 4 MG/DL (ref 2.5–4.5)
PLATELET # BLD AUTO: 154 10E9/L (ref 150–450)
PO2 BLD: 73 MM HG (ref 80–105)
PO2 BLD: 93 MM HG (ref 80–105)
POTASSIUM SERPL-SCNC: 3.9 MMOL/L (ref 3.4–5.3)
POTASSIUM SERPL-SCNC: 4 MMOL/L (ref 3.4–5.3)
POTASSIUM SERPL-SCNC: 4.5 MMOL/L (ref 3.4–5.3)
POTASSIUM SERPL-SCNC: 4.9 MMOL/L (ref 3.4–5.3)
PROT SERPL-MCNC: 6 G/DL (ref 6.8–8.8)
RBC # BLD AUTO: 2.71 10E12/L (ref 3.8–5.2)
SODIUM SERPL-SCNC: 136 MMOL/L (ref 133–144)
SODIUM SERPL-SCNC: 137 MMOL/L (ref 133–144)
SODIUM SERPL-SCNC: 137 MMOL/L (ref 133–144)
SODIUM SERPL-SCNC: 138 MMOL/L (ref 133–144)
TACROLIMUS BLD-MCNC: 6.2 UG/L (ref 5–15)
TME LAST DOSE: NORMAL H
WBC # BLD AUTO: 14.2 10E9/L (ref 4–11)

## 2021-02-06 PROCEDURE — 999N000157 HC STATISTIC RCP TIME EA 10 MIN

## 2021-02-06 PROCEDURE — 80197 ASSAY OF TACROLIMUS: CPT | Performed by: NURSE PRACTITIONER

## 2021-02-06 PROCEDURE — 250N000011 HC RX IP 250 OP 636: Performed by: STUDENT IN AN ORGANIZED HEALTH CARE EDUCATION/TRAINING PROGRAM

## 2021-02-06 PROCEDURE — 250N000013 HC RX MED GY IP 250 OP 250 PS 637: Performed by: NURSE PRACTITIONER

## 2021-02-06 PROCEDURE — 250N000011 HC RX IP 250 OP 636: Performed by: INTERNAL MEDICINE

## 2021-02-06 PROCEDURE — 94640 AIRWAY INHALATION TREATMENT: CPT | Mod: 76

## 2021-02-06 PROCEDURE — 99233 SBSQ HOSP IP/OBS HIGH 50: CPT | Performed by: INTERNAL MEDICINE

## 2021-02-06 PROCEDURE — 250N000009 HC RX 250: Performed by: NURSE PRACTITIONER

## 2021-02-06 PROCEDURE — 31622 DX BRONCHOSCOPE/WASH: CPT | Performed by: INTERNAL MEDICINE

## 2021-02-06 PROCEDURE — 80048 BASIC METABOLIC PNL TOTAL CA: CPT | Performed by: CLINICAL NURSE SPECIALIST

## 2021-02-06 PROCEDURE — 99291 CRITICAL CARE FIRST HOUR: CPT | Mod: 24 | Performed by: INTERNAL MEDICINE

## 2021-02-06 PROCEDURE — 999N000015 HC STATISTIC ARTERIAL MONITORING DAILY

## 2021-02-06 PROCEDURE — 31622 DX BRONCHOSCOPE/WASH: CPT

## 2021-02-06 PROCEDURE — 94003 VENT MGMT INPAT SUBQ DAY: CPT

## 2021-02-06 PROCEDURE — 250N000012 HC RX MED GY IP 250 OP 636 PS 637: Performed by: INTERNAL MEDICINE

## 2021-02-06 PROCEDURE — 250N000012 HC RX MED GY IP 250 OP 636 PS 637: Performed by: NURSE PRACTITIONER

## 2021-02-06 PROCEDURE — 200N000002 HC R&B ICU UMMC

## 2021-02-06 PROCEDURE — 258N000003 HC RX IP 258 OP 636: Performed by: CLINICAL NURSE SPECIALIST

## 2021-02-06 PROCEDURE — 999N001017 HC STATISTIC GLUCOSE BY METER IP

## 2021-02-06 PROCEDURE — 250N000013 HC RX MED GY IP 250 OP 250 PS 637: Performed by: STUDENT IN AN ORGANIZED HEALTH CARE EDUCATION/TRAINING PROGRAM

## 2021-02-06 PROCEDURE — 84100 ASSAY OF PHOSPHORUS: CPT | Performed by: CLINICAL NURSE SPECIALIST

## 2021-02-06 PROCEDURE — 85027 COMPLETE CBC AUTOMATED: CPT | Performed by: CLINICAL NURSE SPECIALIST

## 2021-02-06 PROCEDURE — 80053 COMPREHEN METABOLIC PANEL: CPT | Performed by: CLINICAL NURSE SPECIALIST

## 2021-02-06 PROCEDURE — 82803 BLOOD GASES ANY COMBINATION: CPT | Performed by: STUDENT IN AN ORGANIZED HEALTH CARE EDUCATION/TRAINING PROGRAM

## 2021-02-06 PROCEDURE — 250N000009 HC RX 250: Performed by: STUDENT IN AN ORGANIZED HEALTH CARE EDUCATION/TRAINING PROGRAM

## 2021-02-06 PROCEDURE — 250N000009 HC RX 250: Performed by: CLINICAL NURSE SPECIALIST

## 2021-02-06 PROCEDURE — 94668 MNPJ CHEST WALL SBSQ: CPT

## 2021-02-06 PROCEDURE — 272N000220 HC BRONCHOSCOPE, DISPOSABLE

## 2021-02-06 PROCEDURE — 272N000078 HC NUTRITION PRODUCT INTERMEDIATE LITER

## 2021-02-06 PROCEDURE — 94645 CONT INHLJ TX EACH ADDL HOUR: CPT

## 2021-02-06 PROCEDURE — 94640 AIRWAY INHALATION TREATMENT: CPT

## 2021-02-06 PROCEDURE — 250N000011 HC RX IP 250 OP 636: Performed by: NURSE PRACTITIONER

## 2021-02-06 PROCEDURE — 250N000013 HC RX MED GY IP 250 OP 250 PS 637: Performed by: INTERNAL MEDICINE

## 2021-02-06 PROCEDURE — 90947 DIALYSIS REPEATED EVAL: CPT

## 2021-02-06 PROCEDURE — 99233 SBSQ HOSP IP/OBS HIGH 50: CPT | Mod: GC | Performed by: INTERNAL MEDICINE

## 2021-02-06 PROCEDURE — 83735 ASSAY OF MAGNESIUM: CPT | Performed by: CLINICAL NURSE SPECIALIST

## 2021-02-06 PROCEDURE — 82330 ASSAY OF CALCIUM: CPT | Performed by: CLINICAL NURSE SPECIALIST

## 2021-02-06 RX ADMIN — CALCIUM CHLORIDE, MAGNESIUM CHLORIDE, SODIUM CHLORIDE, SODIUM BICARBONATE, POTASSIUM CHLORIDE AND SODIUM PHOSPHATE DIBASIC DIHYDRATE 3.41 ML/KG/HR: 3.68; 3.05; 6.34; 3.09; .314; .187 INJECTION INTRAVENOUS at 03:00

## 2021-02-06 RX ADMIN — CALCIUM CHLORIDE, MAGNESIUM CHLORIDE, SODIUM CHLORIDE, SODIUM BICARBONATE, POTASSIUM CHLORIDE AND SODIUM PHOSPHATE DIBASIC DIHYDRATE 12.5 ML/KG/HR: 3.68; 3.05; 6.34; 3.09; .314; .187 INJECTION INTRAVENOUS at 17:37

## 2021-02-06 RX ADMIN — PROPOFOL 25 MCG/KG/MIN: 10 INJECTION, EMULSION INTRAVENOUS at 00:15

## 2021-02-06 RX ADMIN — Medication 0.06 MCG/KG/MIN: at 16:46

## 2021-02-06 RX ADMIN — POSACONAZOLE 200 MG: 40 SUSPENSION ORAL at 19:44

## 2021-02-06 RX ADMIN — CALCIUM CHLORIDE, MAGNESIUM CHLORIDE, SODIUM CHLORIDE, SODIUM BICARBONATE, POTASSIUM CHLORIDE AND SODIUM PHOSPHATE DIBASIC DIHYDRATE 12.5 ML/KG/HR: 3.68; 3.05; 6.34; 3.09; .314; .187 INJECTION INTRAVENOUS at 03:01

## 2021-02-06 RX ADMIN — CALCIUM CHLORIDE, MAGNESIUM CHLORIDE, SODIUM CHLORIDE, SODIUM BICARBONATE, POTASSIUM CHLORIDE AND SODIUM PHOSPHATE DIBASIC DIHYDRATE 12.5 ML/KG/HR: 3.68; 3.05; 6.34; 3.09; .314; .187 INJECTION INTRAVENOUS at 10:40

## 2021-02-06 RX ADMIN — TACROLIMUS 1 MG: 5 CAPSULE ORAL at 08:04

## 2021-02-06 RX ADMIN — POLYETHYLENE GLYCOL 3350 17 G: 17 POWDER, FOR SOLUTION ORAL at 08:02

## 2021-02-06 RX ADMIN — INSULIN ASPART 2 UNITS: 100 INJECTION, SOLUTION INTRAVENOUS; SUBCUTANEOUS at 15:59

## 2021-02-06 RX ADMIN — NYSTATIN 1000000 UNITS: 500000 SUSPENSION ORAL at 11:51

## 2021-02-06 RX ADMIN — INSULIN GLARGINE 10 UNITS: 100 INJECTION, SOLUTION SUBCUTANEOUS at 08:05

## 2021-02-06 RX ADMIN — DOCUSATE SODIUM 50 MG AND SENNOSIDES 8.6 MG 2 TABLET: 8.6; 5 TABLET, FILM COATED ORAL at 08:41

## 2021-02-06 RX ADMIN — TACROLIMUS 1.5 MG: 5 CAPSULE ORAL at 18:46

## 2021-02-06 RX ADMIN — BUDESONIDE 0.5 MG: 0.5 INHALANT ORAL at 08:34

## 2021-02-06 RX ADMIN — CALCIUM CHLORIDE, MAGNESIUM CHLORIDE, SODIUM CHLORIDE, SODIUM BICARBONATE, POTASSIUM CHLORIDE AND SODIUM PHOSPHATE DIBASIC DIHYDRATE 12.5 ML/KG/HR: 3.68; 3.05; 6.34; 3.09; .314; .187 INJECTION INTRAVENOUS at 00:20

## 2021-02-06 RX ADMIN — SULFAMETHOXAZOLE AND TRIMETHOPRIM 580 MG: 200; 40 SUSPENSION ORAL at 19:44

## 2021-02-06 RX ADMIN — INSULIN ASPART 2 UNITS: 100 INJECTION, SOLUTION INTRAVENOUS; SUBCUTANEOUS at 11:55

## 2021-02-06 RX ADMIN — Medication 6 MG/HR: at 03:31

## 2021-02-06 RX ADMIN — PREDNISONE 40 MG: 20 TABLET ORAL at 08:03

## 2021-02-06 RX ADMIN — CALCIUM CHLORIDE, MAGNESIUM CHLORIDE, SODIUM CHLORIDE, SODIUM BICARBONATE, POTASSIUM CHLORIDE AND SODIUM PHOSPHATE DIBASIC DIHYDRATE 12.5 ML/KG/HR: 3.68; 3.05; 6.34; 3.09; .314; .187 INJECTION INTRAVENOUS at 00:12

## 2021-02-06 RX ADMIN — NYSTATIN 1000000 UNITS: 500000 SUSPENSION ORAL at 20:26

## 2021-02-06 RX ADMIN — SULFAMETHOXAZOLE AND TRIMETHOPRIM 580 MG: 200; 40 SUSPENSION ORAL at 08:03

## 2021-02-06 RX ADMIN — PIPERACILLIN SODIUM AND TAZOBACTAM SODIUM 4.5 G: 4; .5 INJECTION, POWDER, LYOPHILIZED, FOR SOLUTION INTRAVENOUS at 14:37

## 2021-02-06 RX ADMIN — PIPERACILLIN SODIUM AND TAZOBACTAM SODIUM 4.5 G: 4; .5 INJECTION, POWDER, LYOPHILIZED, FOR SOLUTION INTRAVENOUS at 01:56

## 2021-02-06 RX ADMIN — Medication 40 MG: at 08:03

## 2021-02-06 RX ADMIN — PIPERACILLIN SODIUM AND TAZOBACTAM SODIUM 4.5 G: 4; .5 INJECTION, POWDER, LYOPHILIZED, FOR SOLUTION INTRAVENOUS at 19:55

## 2021-02-06 RX ADMIN — Medication 5 ML: at 08:04

## 2021-02-06 RX ADMIN — PIPERACILLIN SODIUM AND TAZOBACTAM SODIUM 4.5 G: 4; .5 INJECTION, POWDER, LYOPHILIZED, FOR SOLUTION INTRAVENOUS at 08:03

## 2021-02-06 RX ADMIN — HEPARIN SODIUM 5000 UNITS: 5000 INJECTION, SOLUTION INTRAVENOUS; SUBCUTANEOUS at 15:59

## 2021-02-06 RX ADMIN — Medication 75 MCG/HR: at 16:45

## 2021-02-06 RX ADMIN — CALCIUM CHLORIDE 1 G: 100 INJECTION, SOLUTION INTRAVENOUS at 04:44

## 2021-02-06 RX ADMIN — Medication 10 NG/KG/MIN: at 15:34

## 2021-02-06 RX ADMIN — HEPARIN SODIUM 5000 UNITS: 5000 INJECTION, SOLUTION INTRAVENOUS; SUBCUTANEOUS at 08:03

## 2021-02-06 RX ADMIN — POSACONAZOLE 200 MG: 40 SUSPENSION ORAL at 14:38

## 2021-02-06 RX ADMIN — LEVALBUTEROL HYDROCHLORIDE 1.25 MG: 1.25 SOLUTION RESPIRATORY (INHALATION) at 17:03

## 2021-02-06 RX ADMIN — BUDESONIDE 0.5 MG: 0.5 INHALANT ORAL at 20:02

## 2021-02-06 RX ADMIN — LEVALBUTEROL HYDROCHLORIDE 1.25 MG: 1.25 SOLUTION RESPIRATORY (INHALATION) at 20:02

## 2021-02-06 RX ADMIN — PROPOFOL 25 MCG/KG/MIN: 10 INJECTION, EMULSION INTRAVENOUS at 08:41

## 2021-02-06 RX ADMIN — POSACONAZOLE 200 MG: 40 SUSPENSION ORAL at 08:05

## 2021-02-06 RX ADMIN — NYSTATIN 1000000 UNITS: 500000 SUSPENSION ORAL at 08:03

## 2021-02-06 RX ADMIN — HEPARIN SODIUM 5000 UNITS: 5000 INJECTION, SOLUTION INTRAVENOUS; SUBCUTANEOUS at 00:25

## 2021-02-06 RX ADMIN — DOCUSATE SODIUM 50 MG AND SENNOSIDES 8.6 MG 2 TABLET: 8.6; 5 TABLET, FILM COATED ORAL at 19:42

## 2021-02-06 RX ADMIN — LEVALBUTEROL HYDROCHLORIDE 1.25 MG: 1.25 SOLUTION RESPIRATORY (INHALATION) at 12:07

## 2021-02-06 ASSESSMENT — ACTIVITIES OF DAILY LIVING (ADL)
ADLS_ACUITY_SCORE: 20

## 2021-02-06 ASSESSMENT — MIFFLIN-ST. JEOR: SCORE: 1136

## 2021-02-06 NOTE — PROGRESS NOTES
"CRRT STATUS NOTE    DATA:  Time:  6:25 PM  Pressures WNL:  YES  Filter Status:  WDL    Problems Reported/Alarms Noted:  None reported    Supplies Present:  YES    ASSESSMENT:  Patient Net Fluid Balance:    Intake/Output Summary (Last 24 hours) at 2/5/2021 1825  Last data filed at 2/5/2021 1800  Gross per 24 hour   Intake 3226.42 ml   Output 4311 ml   Net -1084.58 ml       Vital Signs:  /59   Pulse 89   Temp 99  F (37.2  C) (Axillary)   Resp 12   Ht 1.6 m (5' 2.99\")   Wt 61.5 kg (135 lb 9.3 oz)   LMP 06/07/2014 (Exact Date)   SpO2 93%   BMI 24.02 kg/m      Labs:    Lab Results   Component Value Date    WBC 10.8 02/05/2021     Lab Results   Component Value Date    RBC 2.61 02/05/2021     Lab Results   Component Value Date    HGB 7.7 02/05/2021     Lab Results   Component Value Date    HCT 23.5 02/05/2021     No components found for: MCT  Lab Results   Component Value Date    MCV 90 02/05/2021     Lab Results   Component Value Date    MCH 29.5 02/05/2021     Lab Results   Component Value Date    MCHC 32.8 02/05/2021     Lab Results   Component Value Date    RDW 15.8 02/05/2021     Lab Results   Component Value Date     02/05/2021     Last Comprehensive Metabolic Panel:  Sodium   Date Value Ref Range Status   02/05/2021 137 133 - 144 mmol/L Final     Potassium   Date Value Ref Range Status   02/05/2021 4.4 3.4 - 5.3 mmol/L Final     Chloride   Date Value Ref Range Status   02/05/2021 105 94 - 109 mmol/L Final     Carbon Dioxide   Date Value Ref Range Status   02/05/2021 24 20 - 32 mmol/L Final     Anion Gap   Date Value Ref Range Status   02/05/2021 8 3 - 14 mmol/L Final     Glucose   Date Value Ref Range Status   02/05/2021 228 (H) 70 - 99 mg/dL Final     Urea Nitrogen   Date Value Ref Range Status   02/05/2021 38 (H) 7 - 30 mg/dL Final     Creatinine   Date Value Ref Range Status   02/05/2021 1.89 (H) 0.52 - 1.04 mg/dL Final     GFR Estimate   Date Value Ref Range Status   02/05/2021 29 (L) >60 " mL/min/[1.73_m2] Final     Comment:     Non  GFR Calc  Starting 12/18/2018, serum creatinine based estimated GFR (eGFR) will be   calculated using the Chronic Kidney Disease Epidemiology Collaboration   (CKD-EPI) equation.       Calcium   Date Value Ref Range Status   02/05/2021 7.8 (L) 8.5 - 10.1 mg/dL Final       Goals of Therapy:  /hr    INTERVENTIONS:   Checked in with bedside RN    PLAN:  Continue with treatment goals. Call CRRT RN at [phone] with questions and concerns.

## 2021-02-06 NOTE — PROGRESS NOTES
CRRT STATUS NOTE    DATA:  Time:  4:04 PM  Pressures WNL:  YES  Filter Status:  WDL  Problems Reported/Alarms Noted:  none  Supplies Present:  YES    ASSESSMENT:  Patient Net Fluid Balance:  -360 at 1830  Vital Signs:  T max temp 97.9, ST 80-90s, MAP >65 currently on levo 0.08, vent FiO2 50%   Labs:  K 4.9, mg 2.5, phos 4, ca ion 4.6, WBC 14.2   Goals of Therapy:  I =O     INTERVENTIONS: No acute interventions needed by CRRT resource RN.     PLAN: Continue to pull fluid per renal orders. Notify CRRT resource RN  with any questions/ concerns. Change circuit q72hr and PRN.

## 2021-02-06 NOTE — PROGRESS NOTES
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  Patient was admitted to OSH on  for acute hypoxemic respiratory failure and new lung opacities. Intubated  and transferred to [facility] for ongoing management.  Extubated  but reintubated - for progressive hypoxemia.  Rapid decompensation 2/3 with ARDS presentation requiring reintubation, prone positioning, and inhaled epoprostenol.  Significant rise in leukocytosis (2/3) concerning for worsening PJP versus secondary infection.      Today's recommendations:  - Tacro increased to 1mg am, 1.5 mg pm, based on level 6.2, will trend levels daily for next several days (ordered for you)  - Continue to follow pending cultures  - BDG fungitell positive 254 on   - Increase posaconazole dose for therapeutic dosing and/or consider transplant ID consult, plan had been for indefinite treatment       Acute hypoxemic respiratory failure:   Right post-obstructive Stenotrophomonas and Eikenella pneumonia:  PJP pneumonia:  Septic shock:   ARDS: Presented to OSH ED with increased SOB and hypoxia, initially requiring 4L NC but continued to decline and subsequently intubated .  Hemodynamic instability after intubation requiring pressors, transferred to [facility].  Afebrile but with leukocytosis.  COVID and respiratory panel negative.  PTA bronch (20) with moderate airway obstruction and RML/RLL mucous plugging, cultures with Stenotrophomonas and  Eikenella (IV ceftazidime 1/13-1/19).  OSH CT chest with new multifocal GGO bilaterally and increased left loculated pleural effusion.  Repeat bronch (1/24) with RUL BAL with thick copious secretions to RML/RLL, PJP PCR positive.  Extubated on 1/26, but reintubated from 1/27-1/29 for progressive hypoxemia.  Remained on either HFNC % FiO2 or BiPAP % FiO2, then eventually decompensated on 2/3 and again reintubated.  Working revealing for ARDS, proning protocol initiated (2/3) with inhaled epoprostenol, pressors initiated.  Significant rise in leukocytosis (2/3) concerning for worsening PJP vs secondary infection.  CT chest (2/3) with progressive consolidation (consistent with ARDS) and possible superimposed infection findings, stable LLL chronic empyema.  CRRT initiated 2/4 as below.  - Sputum culture/gram stain (2/3) NGTD  - Blood cultures (2/3) NGTD   - BDG fungitell (2/4) + 254 on 2/4  - ABX per MICU: Bactrim (1/24) for PJP and Steno (noted 12/23) coverage, empiric Zosyn (2/3) pending cultures (s/p ceftriaxone 1/29-2/1 and Zosyn 1/24-1/29 for Eikenella coverage)  - Stress dose steroids and taper per MICU (prednisone 40 mg daily, decreased 2/2)  - Pulmonary toilet with chest physiotherapy QID and nebs: Xopenex QID, Mucomyst QID, and Pulmicort BID  - Ventilator and ARDS management per MICU       Aspergillus empyema (left-sided): First noted 10/8/19, negative in November and again on admission.  CT scan on 7/17/20 with increased mass-like density, likely pleural-based, in LLL area s/p needle aspiration (8/13/20) with Aspergillus fumigatus on cultures s/p intrapleural amphotericin bead placement (11/20).  Following with ID as OP.  LFTs stable on admission (ALP chronically mildly elevated).  CT chest with increased loculated left pleural effusion s/p thoracentesis (1/25), exudative with 87% neutrophils, very high LDL (6308), cytology normal.    - AFB pleural cultures (1/25) NGTD  - PTA posaconazole, PTA plan  for indefinite course; level 0.8 on 2/4 (prior level 1/26 therapeutic), recommend increasing posaconazole dose if plan is to continue to treat, this was to be an indefinite course per outpatient plan.      S/p bilateral lung transplant for ILD 2/2 CTD:   Right mainstem bronchial stenosis (s/p dilation 3/2019): Last seen in pulmonary clinic 12/2. DSA (1/25) not detected. Continued right mainstem stenosis noted with PTA bronch on 12/23/20, mild on 1/24 bronch.  - Follow up with IP as OP for monitoring of right bronchial stenosis     Immunosuppression: On 2 drug IST d/t recurrent infections  - Tacrolimus increase to 1/1.5mg pm  Goal level 8-10, level 6.2 on 2/6  - Chronic prednisone dose on hold with above stress dose steroids (5 mg qAM / 2.5 mg qPM)     Prophylaxis:   - Bactrim treatment dose as above, PTA had not been on PJP ppx since 7/28/20 as CD4 > 200     H/o CMV viremia: CMV D+/R+.  CMV <137 (1/25).  - VGCV for CMV ppx with stress dose steroids (1/26)      EBV viremia: Level 12/9/20 mildly elevated to 10K (log 4) from prior 3K (3.5 log) on 9/9/20, repeat (1/25/21) decreased to 5619 copies (log of 3.8).  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 Chronic iHD M/Th via AVF with partial graft.  Not able to tolerate iHD 2/3 d/t hypotension, line placed and transitioned to CRRT 2/4.  - Monitoring of tacrolimus as above  - Dialysis management per nephrology     Atrial fibrillation w/ RVR: H/o paroxysmal AF, last 10/2019.  Recurrence 1/30 with RVR (-140s), likely in the setting of acute pulmonary illness.  - Management per MICU    Oropharyngeal candidiasis: White tongue plaque noted 2/1.  - Nystatin QID (2/1)      We appreciate the excellent care provided by the MICU team. Recommendations communicated via in person rounding and this note. Will continue to follow along closely, please do not hesitate to call with any questions or concerns.     Yenni  "MD Santos  Pulmonary Transplant/CF Attending  Pager: [phone]      Subjective & Interval History:     Kecia remains supine today and yesterday, she is currently off vaso and has been able to wean down slightly on her NE. She was spontaneously opening her eyes and follow commands to  on the left which was very delayed but not on the right. Per RN report she does follow commands during sedation vacations. Veletri halved today and tolerating well, while working on weaning sedation as well.     Review of Systems:     ROS as above, otherwise severely limited d/t intubation and sedation    Physical Exam:     Vital signs:  Temp: 97.6  F (36.4  C) Temp src: Axillary BP: 113/60 Pulse: 96   Resp: 24 SpO2: 94 % O2 Device: Mechanical Ventilator Oxygen Delivery: 60 LPM Height: 160 cm (5' 2.99\") Weight: 58.7 kg (129 lb 6.6 oz)  I/O:       Intake/Output Summary (Last 24 hours) at 2/6/2021 1455  Last data filed at 2/6/2021 1400  Gross per 24 hour   Intake 2800.68 ml   Output 3768 ml   Net -967.32 ml       Constitutional: Supine  HEENT: Eyes with pink conjunctivae, anicteric. Orally intubated.   PULM: Mildly diminished bases bilaterally.No appreciable rhonchi.  Non-labored breathing on full vent support.  CV: 1+ BLE edema.   ABD: Soft, mildly distended, no grimacing to palpation, active bowel sounds  MSK: Not moving extremities.   NEURO: Sedated, not conversant. Follows commands on right side for me, per report follows on both sides, this is very delayed overall. Does not track to verbal command, opens eyes to verbal +tactile stim.   SKIN: Warm, dry. No rash on limited exam.   PSYCH: Calm.       Data:     LABS    CMP:   Recent Labs   Lab 02/06/21  1000 02/06/21  0345 02/05/21  2150 02/05/21  1548 02/05/21  0321 02/05/21  0321 02/04/21  1601 02/04/21  1601 02/04/21  0310    137 138 137   < > 135   < > 135 133   POTASSIUM 4.0 3.9 4.0 4.4   < > 3.7   < > 3.9 3.3*   CHLORIDE 105 106 106 105   < > 103   < > 102 100   CO2 24 " 24 24 24   < > 25   < > 26 23   ANIONGAP 7 7 8 8   < > 6   < > 7 10   * 119* 162* 228*   < > 162*   < > 146* 213*   BUN 27 30 34* 38*   < > 46*   < > 62* 68*   CR 1.35* 1.59* 1.73* 1.89*   < > 2.42*   < > 3.40* 3.84*   GFRESTIMATED 43* 35* 32* 29*   < > 21*   < > 14* 12*   GFRESTBLACK 50* 41* 37* 33*   < > 25*   < > 16* 14*   CHELSEY 8.6 7.7* 8.2* 7.8*   < > 8.0*   < > 8.2* 8.0*   MAG  --  2.3  --  2.3  --  2.2  --  2.1 1.9   PHOS  --  3.9  --  3.7  --  4.0  --  3.2 2.4*   PROTTOTAL  --  6.0*  --   --   --  5.6*  --   --  5.2*   ALBUMIN  --  1.9*  --   --   --  1.9*  --   --  1.9*   BILITOTAL  --  0.4  --   --   --  0.4  --   --  0.4   ALKPHOS  --  142  --   --   --  146  --   --  174*   AST  --  3  --   --   --  6  --   --  9   ALT  --  32  --   --   --  28  --   --  33    < > = values in this interval not displayed.     CBC:   Recent Labs   Lab 02/06/21  0345 02/05/21  0321 02/04/21  0310 02/03/21  0415   WBC 14.2* 10.8 17.3* 21.7*   RBC 2.71* 2.61* 2.95* 3.45*   HGB 8.1* 7.7* 8.9* 10.0*   HCT 24.9* 23.5* 26.9* 31.3*   MCV 92 90 91 91   MCH 29.9 29.5 30.2 29.0   MCHC 32.5 32.8 33.1 31.9   RDW 16.2* 15.8* 15.1* 15.3*    138* 203 290       INR: No lab results found in last 7 days.    Glucose:   Recent Labs   Lab 02/06/21  1155 02/06/21  1000 02/06/21  0758 02/06/21  0345 02/06/21  0344 02/06/21  0005 02/05/21  2150 02/05/21  1935 02/05/21  1548 02/05/21  1546 02/05/21  0945 02/05/21  0945 02/05/21  0321 02/05/21  0321   GLC  --  163*  --  119*  --   --  162*  --  228*  --   --  161*  --  162*   *  --  137*  --  108* 123*  --  155*  --  210*   < >  --    < >  --     < > = values in this interval not displayed.       Blood Gas:   Recent Labs   Lab 02/06/21  0800 02/06/21  0345 02/05/21  1548 01/31/21  0441 01/31/21  0441   PHV  --   --   --   --  7.43   PCO2V  --   --   --   --  41   PO2V  --   --   --   --  50*   HCO3V  --   --   --   --  27   LASHAUN  --   --   --   --  2.6   O2PER 65.0 55.0 55   < >  70.0    < > = values in this interval not displayed.       Culture Data   Recent Labs   Lab 02/03/21  0941 02/03/21  0900 02/03/21  0816   CULT No growth No growth after 3 days No growth after 3 days       Virology Data:   Lab Results   Component Value Date    FLUAH1 Not Detected 01/24/2021    FLUAH3 Not Detected 01/24/2021    NP9587 Not Detected 01/24/2021    IFLUB Not Detected 01/24/2021    RSVA Not Detected 01/24/2021    RSVB Not Detected 01/24/2021    PIV1 Not Detected 01/24/2021    PIV2 Not Detected 01/24/2021    PIV3 Not Detected 01/24/2021    HMPV Not Detected 01/24/2021    HRVS Negative 01/24/2021    ADVBE Negative 01/24/2021    ADVC Negative 01/24/2021    ADVC Negative 12/23/2020    ADVC Negative 10/07/2019       Historical CMV results (last 3 of prior testing):  Lab Results   Component Value Date    CMVQNT <137 (A) 01/25/2021    CMVQNT 238 (A) 01/24/2021    CMVQNT 675 (A) 12/23/2020     Lab Results   Component Value Date    CMVLOG <2.1 01/25/2021    CMVLOG 2.4 (H) 01/24/2021    CMVLOG 2.8 (H) 12/23/2020       Urine Studies    Recent Labs   Lab Test 01/24/21  1729 10/21/19  2240   URINEPH 5.0 5.0   NITRITE Negative Negative   LEUKEST Moderate* Large*   WBCU 34* 115*       Most Recent [place]ze Pulmonary Function Testing (FVC/FEV1 only)  FVC-Pre   Date Value Ref Range Status   12/09/2020 1.12 L    09/09/2020 1.56 L    03/09/2020 1.57 L    02/19/2020 1.78 L      FVC-%Pred-Pre   Date Value Ref Range Status   12/09/2020 34 %    09/09/2020 47 %    03/09/2020 48 %    02/19/2020 54 %      FEV1-Pre   Date Value Ref Range Status   12/09/2020 0.98 L    09/09/2020 1.10 L    03/09/2020 0.96 L    02/19/2020 0.99 L      FEV1-%Pred-Pre   Date Value Ref Range Status   12/09/2020 37 %    09/09/2020 42 %    03/09/2020 37 %    02/19/2020 38 %        IMAGING    Recent Results (from the past 48 hour(s))   CT Chest w/o Contrast    Narrative    EXAMINATION: Chest CT  2/4/2021 3:49 AM    CLINICAL HISTORY: Pneumonia, effusion or  abscess suspected.    COMPARISON: CT chest 1/27/2021. CT chest 12/9/2020.    TECHNIQUE: CT imaging obtained through the chest without contrast.  Axial, coronal, and sagittal reconstructions and axial MIP reformatted  images are reviewed.     CONTRAST: None    FINDINGS:  Lines and tubes: Endotracheal tube tip terminates in the low thoracic  trachea. Right internal jugular Central venous catheter tip terminates  in the distal SVC. Enteric tube courses into the stomach.    Lungs: Postsurgical changes of bilateral lung transplant. Fluid-filled  upper trachea. Central tracheobronchial tree is otherwise patent.  Trace bilateral pleural effusions. Diffuse groundglass opacification  with interlobular septal thickening throughout the bilateral lung  fields with large confluent areas of consolidation, most pronounced in  the bilateral lower lobes. Overall the degree of consolidation has  increased from prior exam on 1/27/2021. No significant change in the  peripherally calcified chronic empyema in the left lung base.  Intrapleural amphotericin bead noted in the left lung base.    Mediastinum: The visualized thyroid is unremarkable. Cardiac size is  enlarged. No significant pericardial effusion. Normal caliber of the  aorta and main pulmonary artery. Normal branching pattern of the  aortic arch. No significant coronary artery calcium. Mild  atherosclerotic changes of the thoracic aorta. Scattered prominent  mediastinal lymph nodes, not enlarged by size criteria. Esophagus is  normal in caliber.    Bones and soft tissues: Degenerative changes of the spine. Stable  anterior wedge compression deformity of the T5 vertebral body. Old  right sixth rib fracture. Intact median sternotomy wires. No  suspicious osseous lesions. Mild diffuse anasarca.    Upper Abdomen: Limited evaluation of the upper abdomen is within  normal limits.      Impression    IMPRESSION:   1.  Findings compatible with acute respiratory distress  syndrome  secondary to known pneumocystis jiroveci infection. Overall the degree  of consolidation has progressed compared to the prior CT on 1/27/2021.  Cannot exclude superimposed infection.  2. No significant change in the chronic empyema in the left lower lobe  status post intrapleural amphotericin B placement.    I have personally reviewed the examination and initial interpretation  and I agree with the findings.    MEHNAZ CHAPMAN MD

## 2021-02-06 NOTE — PROGRESS NOTES
MEDICAL ICU PROGRESS NOTE  02/06/2021      Date of Service (when I saw the patient): 02/06/2021    ASSESSMENT: Kecia Blue is a 60 year old female with PMH significant for HTN, ESRD on dialysis, ILD with antisynthetase sydrome s/p BSLT 03/2018 (CMV D+/R+, EBV D+/R+) postoperatively complicated by 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 She was transferred from OSH ICU 1/22 and intubated and requiring vasopressors. Found to have PCP pneumonia.     CHANGES and MAJOR THINGS TODAY:   - Remain supine   - CRRT initiated 2/4, goal net negative 50 ml/hr (1.2L)  - Continue treatment with bactrim, prednisone taper  - Continue zosyn empiric treatment     Attending statement:    The patient was seen and examined by me with the  ICU  resident. The case was discussed at length.  Vitals, lab results and imaging from our visit were reviewed.  The note written by the ICU resident above reflects our joint assessment and plan. Lung TP patient with ARDS on mechanical ventilatory support. Actively managing vent with lung protective strategy. Continuing prone positioning. Continuing CRRT for RADHA. Continuing prednisone taper and Rx with zosyn.    Marcio Vale MD  Critical care time = 35 minutes separate from procedures      PLAN:     Neuro:  # Pain and sedation  - Continue propofol, fentanyl, and versed ggts  - Fentanyl and versed PRNs ordered     #Insomnia  - Melatonin      #Unequal pupils  Patient with alternating fixed and dilated pupils. CTH unremarkable. Neuro exam otherwise unremarkable  - Optho consulted, no acute concerns     Pulmonary:  #Acute Hypoxic Respiratory Failure,  Suspected 2/2 PJP initially. OSH CT 1/23 + bilateral ground glass opacities and consolidative lung infiltrates centrally distributed to the upper lobes and RLL, . Covid-19 negative  X3 at OSH. Given acute and severe decompensation, concern for secondary pneumonia. 2/4 CT chest which displayed worsening consolidations consistent with progression of pneumocystis with the possibility of superimposed secondary pneumonia.  - PJP PCR positive, returned on 1/28  - Continue micro work up and management as below  - Continue full strength flolan, consider weaning    - Continue diuresis as below  - P/F 143, with remain supine  - Care conference yesterday, continue restorative measures, DNR/comfort cares conversation ongoing    Ventilation Mode: CMV/AC  (Continuous Mandatory Ventilation/ Assist Control)  FiO2 (%): 55 %  Rate Set (breaths/minute): 24 breaths/min  Tidal Volume Set (mL): 320 mL  PEEP (cm H2O): 8 cmH2O  Oxygen Concentration (%): 55 %  Resp: 26          #S/P Bilateral Lung Transplant   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  - Positive PJP PCR on 1/28 with positive fungitel   - Continue PTA tacrolimus  - Prednisone taper, holding home dose for now, may consider stress dosing steroids  - Pulmonary lung transplant team consulted and following, appreciate recs     Cardiovascular:  #Shock  #Hx Hypertension  Patient ith hypotension likely related to septic shock with some contribution of propofol. Last ECHO 10/21/20 with EF 60-65%, normal LV & RV function.   - Continue levo  - Hold PTA coreg and amlodipine      # Atrial fibrillation w/ RVR  Patient with pAF with rates into 130-40s. Likely in the setting of acute pulmoary illness. HR now < 100 following intubation.     GI/Nutrition:  # Portal HTN  Liver biopsy positive for congestive hepatopathy. Previous had ascites thought to be r/t elevated right sided heart pressures. Last saw GI on 12/21/20 and patient endorsed that her ascites is no longer present.  Etiology unclear.   - NTD     # Nutrition  - NJT placed by KATHERINE laws goal rate of 40     # Constipation  Patient reportedly has history of constipation with bactrim use, now stooling  - Scheduled miralax ordered  - PRN dulcolax and senna-doc ordered     Renal/Fluids/Electrolytes:  # ESRD on iHD twice weekly (M/Th).   # Anion gap metabolic acidosis r/t ESRD, uremia- Resolved  # On CRRT  Outpatient dry body weight 56.5 kg. Ran dialysis 1/26, 1/28, 1/30  - Nephrology Consulted   - CRRT initiated 2/4, goal net negative 1-2 L  - I&O  - Daily Weights   - Trend BMP      # Hypokalemia  - Dialysis as above     Endocrine:  # Seronegative RA with Raynaud's   - NTD, monitor   # Hyperglycemia, steroid induced  - High intensity sliding scale insulin  - 10 units glargine daily     ID:  # Sepsis with Shock  Suspected related to PCP pneumonia. OSH CT 1/23 + bilateral ground glass opacities and consolidative lung infiltrates centrally distributed to the upper lobes and RLL. Covid-19 negative X3 at OSH. Procalcitonin negative. Urine strep, CMV, and RPP negative. Given acute and severe decompensation, concern for secondary pneumonia. 2/4 CT chest which displayed worsening consolidations consistent with progression of pneumocystis with the possibility of superimposed secondary pneumonia.  - PJP PCR positive, fungitell positive as well  - Continue treatment with bactrim, prednisone taper  - Continue zosyn empiric treatment  - Patient respiratory status too tenuous for bronchoscopy, will reconsider as able  - s/p thora 1/25, fluid exudative, NGTD  - s/p 7 day course of empiric ceftriaxone      # Chronic/Stable right aspergillus empyema   - Continue PTA posaconazole, will monitor QTc  - Discussed discontinuing posa with ID, recommended not discontinuing during acute illness     Hematology:    # Anemia r/t renal disease, chronic stable   # Thrombocytopenia r/t critical illness, resolved   # Leukocytosis, infection/steroids  Takes aranesp 25  mcg every four weeks, last dose 01/14. Next dose 2/11  - Daily CBC     Musculoskeletal:  #Weakness/Deconditioning of critical illness  - PT/OT consulted     Skin:  # Dermatomyositis antitryptase syndrome   - Noted, NTD     General Cares/Prophylaxis:    DVT Prophylaxis: Heparin SQ  GI Prophylaxis: PPI  Restraints: Not indicated  Family Communication: Plan to update  at patient bedside.   Code Status: Full      Lines/tubes/drains:  - HD fistula left forearm  - CVC RIJ  - PIV  - Will replace A-line  - NJT  - Rectal pouch  - ETT    Disposition:  - Medical ICU    Patient seen and findings/plan discussed with medical ICU staff, Dr. Vale.    Chano Thompson DNP ARPN CNP CCRN    ====================================  INTERVAL HISTORY:   No acute events. Tolerated supine overnight.     OBJECTIVE:   1. VITAL SIGNS:   Temp:  [97.4  F (36.3  C)-99  F (37.2  C)] 97.5  F (36.4  C)  Pulse:  [] 75  Resp:  [12-30] 26  BP: (113)/(60) 113/60  MAP:  [59 mmHg-155 mmHg] 74 mmHg  Arterial Line BP: ()/() 98/53  FiO2 (%):  [50 %-65 %] 65 %  SpO2:  [91 %-98 %] 96 %  Ventilation Mode: CMV/AC  (Continuous Mandatory Ventilation/ Assist Control)  FiO2 (%): (S) 65 %  Rate Set (breaths/minute): 24 breaths/min  Tidal Volume Set (mL): 320 mL  PEEP (cm H2O): 8 cmH2O  Oxygen Concentration (%): 55 %  Resp: 26    2. INTAKE/ OUTPUT:   I/O last 3 completed shifts:  In: 3219.72 [I.V.:1747.72; NG/GT:427]  Out: 4278 [Other:4128; Stool:150]    3. PHYSICAL EXAMINATION:  General: Laying in bed. No acute distress. Intubated, sedated, supine position.  Neuro: Sedated, following simple commands. no notable clonus  Pulm/Resp: Diffuse coarse breath sounds, end expiratory wheezing, relatively unchanged  CV: Pulses intact, irregularly irregular   Abdomen: soft, non-distended   Incisions/Skin: Left AV fistula with thrill and bruit    4. LABS:   Arterial Blood Gases   Recent Labs   Lab 02/06/21  0345 02/05/21  1548 02/05/21  1131  02/05/21  0326   PH 7.38 7.39 7.37 7.34*   PCO2 42 40 41 43   PO2 73* 97 81 74*   HCO3 25 24 24 23     Complete Blood Count   Recent Labs   Lab 02/06/21  0345 02/05/21 0321 02/04/21  0310 02/03/21  0415   WBC 14.2* 10.8 17.3* 21.7*   HGB 8.1* 7.7* 8.9* 10.0*    138* 203 290     Basic Metabolic Panel  Recent Labs   Lab 02/06/21  0345 02/05/21  2150 02/05/21  1548 02/05/21  0945    138 137 138   POTASSIUM 3.9 4.0 4.4 4.0   CHLORIDE 106 106 105 105   CO2 24 24 24 23   BUN 30 34* 38* 41*   CR 1.59* 1.73* 1.89* 2.07*   * 162* 228* 161*     Liver Function Tests  Recent Labs   Lab 02/06/21 0345 02/05/21 0321 02/04/21  0310   AST 3 6 9   ALT 32 28 33   ALKPHOS 142 146 174*   BILITOTAL 0.4 0.4 0.4   ALBUMIN 1.9* 1.9* 1.9*     Coagulation Profile  No lab results found in last 7 days.    5. RADIOLOGY:   No results found for this or any previous visit (from the past 24 hour(s)).    Called by RT and RN to assess patient's airway based on increased peak inspiratory pressures on vent and inability to pass a suction catheter beyond the ET tube.  Ventilator circuitry checked and intact. Exam - Good air entry bilaterally.   Bedside flexible fiberoptic bronchoscopy performed. ET tube (7) patent and in stated position. Inspection showed the ET tube to be resting on the gee with evidence of minor irritation at the contact point. ET tube pulled back until end of tube was 4 tracheal rings above gee and secured in place to 20 cm @ the lips. Patient tolerated the procedure without difficulty.   Detail Level: Zone Discontinue Regimen: Previously prescribed: Triamcinolone

## 2021-02-06 NOTE — PROGRESS NOTES
CRRT STATUS NOTE    DATA:  Time: 6:31 AM   Pressures WNL:  YES  Filter Status:  WDL    Problems Reported/Alarms Noted:  None    Supplies Present:  YES    ASSESSMENT:  Patient Net Fluid Balance:  At midnight -1089ml at 0600 -202ml     Vital Signs:  Temp: 97.5  F (36.4  C)ax Arterial Line BP:101/49 (72) mmHg Pulse: 75 Resp: 26 SpO2: 95 % O2 Device: Mechanical Ventilator fiO2 65%    Labs:   Recent Labs   Lab Test 02/06/21  0345 02/05/21  2150    138   POTASSIUM 3.9 4.0   CHLORIDE 106 106   CO2 24 24   ANIONGAP 7 8   * 162*   BUN 30 34*   CR 1.59* 1.73*   CHELSEY 7.7* 8.2*   iCa 4.2 4.5       Goals of Therapy:   50-100cc/hr, goal is 50cc/hr if on 2 pressors, can try 100cc/hr if down to one pressor.     INTERVENTIONS:   iCa 4.2  - 1gm CaCl given, Circuit changed.    PLAN:  Continue with treatment goal. Call CRRT RN with questions and concerns at [phone].

## 2021-02-06 NOTE — PLAN OF CARE
ICU End of Shift Summary. See flowsheets for vital signs and detailed assessment.    Changes this shift: Propofol weaned off. Currently on versed and fentanyl. Following commands, nodding to yes/no questions. Levophed to keep MAP > 65. 50%/24/320/8. Veletri decreased to 10 ng/kg/min. No BM, miralax and PRN senna given. CRRT I=O.     Plan: continue to monitor, notify team of changes

## 2021-02-06 NOTE — PROGRESS NOTES
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 Nephrology consulted for dialysis needs. CRRT started  2/4/21       Assessment & Recommendations:     RECOMMENDATIONS  Continue CRRT, all 4K baths.  --Will change to I=O as patient has very minimal edema, weight has gone down and still requiring pressors for hypotension.       ESRD  Runs at [place] through [facility] group.  Has atypical HD schedule of Monday and Thursday, has AVF with partial graft which has been challenging to access per RN's.    Due to hemodynamic instability requiring pressors, initiated on CRRT on 2/4/2021  Access: -- Has L arm AVF/graft, somewhat challenging access.               -- Current Access : Left femoral     BP : 89/47  On NE .  Volume status- Minimally hypervolemic   Satting 93% with FiO2 of 60% and PEEP of 8, unchanged   Net negative 1089 mL in the last 24 hours .High obligate intake: 3219  ( IV, TF,FW)   Admission weight 1/24 - 58.6 kg --> 61.5 ( 2/5) --> 58.7 kg ( 2/6)   CT chest shows ARDS 2/2 PJP, progressed from prior.  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 Mg and Phos normal       Anemia-Hgb 8.1 , stable. Management per team.  Checked iron stores and sats 36% on 2/3.     Nutrition-Nutren TF.      Fungal empyema  PJP pneumonia  Left mainstem bronchial stenosis   fungitel positive   Zosyn  Posaconazole, bactrim, vancomycin,valcyte    Seen and discussed with Dr Gamble     Recommendations were communicated to primary team via verbal communication.       Interval History  ":   Remains intubated and sedated.  She remains anuric   still hypotensive on norepinephrine.  Currently on CRRT, net UF target 50 -100 cc/h.    NET NEGATIVE 1 L in the last 24 hours.  High obligate intake requirement at 3.2 L.  FiO2 requirement remains stable at 60% with PEEP of 8.       Review of Systems:   I reviewed the following systems:  ROS not done due to vent/sedation.     Physical Exam:   I/O last 3 completed shifts:  In: 3352.98 [I.V.:1815.98; NG/GT:457]  Out: 4260 [Other:4110; Stool:150]   /60 (BP Location: Right leg)   Pulse 80   Temp 97.5  F (36.4  C) (Axillary)   Resp 26   Ht 1.6 m (5' 2.99\")   Wt 58.7 kg (129 lb 6.6 oz)   LMP 06/07/2014 (Exact Date)   SpO2 92%   BMI 22.93 kg/m       GENERAL APPEARANCE: Re-intubated, proned.   EYES: no scleral icterus  Endo: no moon facies  Pulmonary: Intubated, proned, equal expansion.    CV: regular rhythm, normal rate - Edema present  GI: soft, non distended  MS: no evidence of inflammation in joints, no muscle tenderness  :  Basilio present  SKIN: warm, dry, +1 edema.    NEURO: intubated and sedated.    Access: -- Has L arm AVF/graft, somewhat challenging access.               -- Current Access : Left femoral       Labs:   All labs reviewed by me  Electrolytes/Renal -   Recent Labs   Lab Test 02/06/21  0345 02/05/21  2150 02/05/21  1548 02/05/21  0321 02/05/21  0321    138 137   < > 135   POTASSIUM 3.9 4.0 4.4   < > 3.7   CHLORIDE 106 106 105   < > 103   CO2 24 24 24   < > 25   BUN 30 34* 38*   < > 46*   CR 1.59* 1.73* 1.89*   < > 2.42*   * 162* 228*   < > 162*   CHELSEY 7.7* 8.2* 7.8*   < > 8.0*   MAG 2.3  --  2.3  --  2.2   PHOS 3.9  --  3.7  --  4.0    < > = values in this interval not displayed.       CBC -   Recent Labs   Lab Test 02/06/21  0345 02/05/21  0321 02/04/21  0310   WBC 14.2* 10.8 17.3*   HGB 8.1* 7.7* 8.9*    138* 203       LFTs -   Recent Labs   Lab Test 02/06/21 0345 02/05/21  0321 02/04/21  0310   ALKPHOS 142 146 " 174*   BILITOTAL 0.4 0.4 0.4   ALT 32 28 33   AST 3 6 9   PROTTOTAL 6.0* 5.6* 5.2*   ALBUMIN 1.9* 1.9* 1.9*       Iron Panel -   Recent Labs   Lab Test 02/03/21  0415 12/13/18  1033 08/01/18  0921   IRON 51 16* 93   IRONSAT 36 7* 37   YOLA  --  302* 571*           Current Medications:    B and C vitamin Complex with folic acid  5 mL Oral or Feeding Tube Daily     budesonide  0.5 mg Nebulization BID     heparin ANTICOAGULANT  5,000 Units Subcutaneous Q8H     insulin aspart  1-12 Units Subcutaneous Q4H     insulin glargine  10 Units Subcutaneous QAM AC     levalbuterol  1.25 mg Nebulization 4x Daily     nystatin  1,000,000 Units Mouth/Throat 4x Daily     pantoprazole  40 mg Oral QAM AC     piperacillin-tazobactam  4.5 g Intravenous Q6H     polyethylene glycol  17 g Oral or Feeding Tube Daily     posaconazole  200 mg Oral TID     [Held by provider] predniSONE  2.5 mg Oral or Feeding Tube QPM     [START ON 2/7/2021] predniSONE  20 mg Oral Daily     [Held by provider] predniSONE  5 mg Oral or Feeding Tube QAM     sulfamethoxazole-trimethoprim  580 mg Oral or Feeding Tube BID     tacrolimus  1 mg Oral or Feeding Tube BID IS     valGANciclovir  450 mg Oral Once per day on Mon Thu       dextrose       CRRT replacement solution 12.5 mL/kg/hr (02/06/21 1040)     epoprostenol (VELETRI) 20 mcg/mL in sterile water inhalation solution 20 ng/kg/min (02/06/21 0836)     fentaNYL 75 mcg/hr (02/06/21 1100)     midazolam 4 mg/hr (02/06/21 1100)     - MEDICATION INSTRUCTIONS -       norepinephrine 0.2 mcg/kg/min (02/06/21 1100)     CRRT replacement solution 3.413 mL/kg/hr (02/06/21 0300)     CRRT replacement solution 12.5 mL/kg/hr (02/06/21 1040)     propofol (DIPRIVAN) infusion 20 mcg/kg/min (02/06/21 1000)     sodium chloride 10 mL/hr at 02/03/21 0400     vasopressin

## 2021-02-06 NOTE — PLAN OF CARE
Major Shift Events:  Propofol, versed, and fentanyl gtts infusing for sedation. Arouses to voice and inconsistently follows simple commands (squeeze hands, wiggles toes, nods). Levophed titrated to maintain MAP > 65 (range from 0.11-0.15). Sinus arrhythmia for most of night (rates 70-100s), frequent PACs/PVCs. Lung sounds coarse/diminished, though clear after suctioning. FiO2 titrated from 50% to 65% per ABG results. Ongoing inhaled Veletri. No stool output overnight. Anuric. CRRT circuit changed 1x by resource RN due to clotting. CRRT net hour fluid removal rate titrated per intake and patient tolerance (see I&Os). Calcium replaced this AM per PRNs orders.     Plan: Continue CRRT. Wean FiO2 and sedation as tolerated.     For vital signs and complete assessments, please see documentation flowsheets.     Melissa Troy RN on 2/6/2021 at 5:50 AM

## 2021-02-06 NOTE — PROGRESS NOTES
RT Note:    Unable to pass ETT suction catheter all the way and not getting secretions during suctioning. Peak pressures on ventilator starting to rise. Requested disposable bronch from team. Bronchoscope passed easily through the ETT and found ETT to be right at gee. ETT pulled back 2 cm by RT over bronchoscope. Patient has weak cough.    Adilia Ospina, RRT

## 2021-02-07 LAB
ALBUMIN SERPL-MCNC: 2 G/DL (ref 3.4–5)
ALP SERPL-CCNC: 166 U/L (ref 40–150)
ALT SERPL W P-5'-P-CCNC: 31 U/L (ref 0–50)
ANION GAP SERPL CALCULATED.3IONS-SCNC: 5 MMOL/L (ref 3–14)
ANION GAP SERPL CALCULATED.3IONS-SCNC: 6 MMOL/L (ref 3–14)
ANION GAP SERPL CALCULATED.3IONS-SCNC: 6 MMOL/L (ref 3–14)
AST SERPL W P-5'-P-CCNC: 3 U/L (ref 0–45)
BACTERIA SPEC CULT: NORMAL
BASE EXCESS BLDA CALC-SCNC: 1.3 MMOL/L
BILIRUB SERPL-MCNC: 0.4 MG/DL (ref 0.2–1.3)
BUN SERPL-MCNC: 23 MG/DL (ref 7–30)
BUN SERPL-MCNC: 23 MG/DL (ref 7–30)
BUN SERPL-MCNC: 24 MG/DL (ref 7–30)
CA-I BLD-MCNC: 4.7 MG/DL (ref 4.4–5.2)
CA-I BLD-MCNC: 4.7 MG/DL (ref 4.4–5.2)
CA-I SERPL ISE-MCNC: 4.6 MG/DL (ref 4.4–5.2)
CALCIUM SERPL-MCNC: 8.2 MG/DL (ref 8.5–10.1)
CALCIUM SERPL-MCNC: 8.4 MG/DL (ref 8.5–10.1)
CALCIUM SERPL-MCNC: 8.5 MG/DL (ref 8.5–10.1)
CHLORIDE SERPL-SCNC: 106 MMOL/L (ref 94–109)
CHLORIDE SERPL-SCNC: 106 MMOL/L (ref 94–109)
CHLORIDE SERPL-SCNC: 107 MMOL/L (ref 94–109)
CO2 SERPL-SCNC: 26 MMOL/L (ref 20–32)
CREAT SERPL-MCNC: 1.1 MG/DL (ref 0.52–1.04)
CREAT SERPL-MCNC: 1.11 MG/DL (ref 0.52–1.04)
CREAT SERPL-MCNC: 1.17 MG/DL (ref 0.52–1.04)
ERYTHROCYTE [DISTWIDTH] IN BLOOD BY AUTOMATED COUNT: 16.9 % (ref 10–15)
GFR SERPL CREATININE-BSD FRML MDRD: 51 ML/MIN/{1.73_M2}
GFR SERPL CREATININE-BSD FRML MDRD: 55 ML/MIN/{1.73_M2}
GFR SERPL CREATININE-BSD FRML MDRD: 55 ML/MIN/{1.73_M2}
GLUCOSE BLDC GLUCOMTR-MCNC: 110 MG/DL (ref 70–99)
GLUCOSE BLDC GLUCOMTR-MCNC: 113 MG/DL (ref 70–99)
GLUCOSE BLDC GLUCOMTR-MCNC: 131 MG/DL (ref 70–99)
GLUCOSE BLDC GLUCOMTR-MCNC: 138 MG/DL (ref 70–99)
GLUCOSE BLDC GLUCOMTR-MCNC: 153 MG/DL (ref 70–99)
GLUCOSE BLDC GLUCOMTR-MCNC: 79 MG/DL (ref 70–99)
GLUCOSE SERPL-MCNC: 118 MG/DL (ref 70–99)
GLUCOSE SERPL-MCNC: 160 MG/DL (ref 70–99)
GLUCOSE SERPL-MCNC: 172 MG/DL (ref 70–99)
HCO3 BLD-SCNC: 26 MMOL/L (ref 21–28)
HCT VFR BLD AUTO: 23.9 % (ref 35–47)
HGB BLD-MCNC: 7.8 G/DL (ref 11.7–15.7)
MAGNESIUM SERPL-MCNC: 2.6 MG/DL (ref 1.6–2.3)
MAGNESIUM SERPL-MCNC: 2.6 MG/DL (ref 1.6–2.3)
MCH RBC QN AUTO: 29.9 PG (ref 26.5–33)
MCHC RBC AUTO-ENTMCNC: 32.6 G/DL (ref 31.5–36.5)
MCV RBC AUTO: 92 FL (ref 78–100)
O2/TOTAL GAS SETTING VFR VENT: 55 %
PCO2 BLD: 43 MM HG (ref 35–45)
PH BLD: 7.39 PH (ref 7.35–7.45)
PHOSPHATE SERPL-MCNC: 4 MG/DL (ref 2.5–4.5)
PHOSPHATE SERPL-MCNC: 4 MG/DL (ref 2.5–4.5)
PLATELET # BLD AUTO: 143 10E9/L (ref 150–450)
PO2 BLD: 67 MM HG (ref 80–105)
POTASSIUM SERPL-SCNC: 4.2 MMOL/L (ref 3.4–5.3)
POTASSIUM SERPL-SCNC: 4.5 MMOL/L (ref 3.4–5.3)
POTASSIUM SERPL-SCNC: 5.2 MMOL/L (ref 3.4–5.3)
PROT SERPL-MCNC: 6 G/DL (ref 6.8–8.8)
RBC # BLD AUTO: 2.61 10E12/L (ref 3.8–5.2)
SODIUM SERPL-SCNC: 137 MMOL/L (ref 133–144)
SODIUM SERPL-SCNC: 138 MMOL/L (ref 133–144)
SODIUM SERPL-SCNC: 138 MMOL/L (ref 133–144)
SPECIMEN SOURCE: NORMAL
TACROLIMUS BLD-MCNC: 7.7 UG/L (ref 5–15)
TME LAST DOSE: NORMAL H
WBC # BLD AUTO: 13.1 10E9/L (ref 4–11)

## 2021-02-07 PROCEDURE — 250N000013 HC RX MED GY IP 250 OP 250 PS 637: Performed by: INTERNAL MEDICINE

## 2021-02-07 PROCEDURE — 85027 COMPLETE CBC AUTOMATED: CPT | Performed by: CLINICAL NURSE SPECIALIST

## 2021-02-07 PROCEDURE — 250N000011 HC RX IP 250 OP 636: Performed by: INTERNAL MEDICINE

## 2021-02-07 PROCEDURE — 250N000013 HC RX MED GY IP 250 OP 250 PS 637: Performed by: NURSE PRACTITIONER

## 2021-02-07 PROCEDURE — 80048 BASIC METABOLIC PNL TOTAL CA: CPT | Performed by: INTERNAL MEDICINE

## 2021-02-07 PROCEDURE — 94003 VENT MGMT INPAT SUBQ DAY: CPT

## 2021-02-07 PROCEDURE — 272N000078 HC NUTRITION PRODUCT INTERMEDIATE LITER

## 2021-02-07 PROCEDURE — 94640 AIRWAY INHALATION TREATMENT: CPT | Mod: 76

## 2021-02-07 PROCEDURE — 250N000009 HC RX 250: Performed by: CLINICAL NURSE SPECIALIST

## 2021-02-07 PROCEDURE — 250N000013 HC RX MED GY IP 250 OP 250 PS 637: Performed by: STUDENT IN AN ORGANIZED HEALTH CARE EDUCATION/TRAINING PROGRAM

## 2021-02-07 PROCEDURE — 83735 ASSAY OF MAGNESIUM: CPT | Performed by: CLINICAL NURSE SPECIALIST

## 2021-02-07 PROCEDURE — 999N000157 HC STATISTIC RCP TIME EA 10 MIN

## 2021-02-07 PROCEDURE — 94640 AIRWAY INHALATION TREATMENT: CPT

## 2021-02-07 PROCEDURE — 999N000015 HC STATISTIC ARTERIAL MONITORING DAILY

## 2021-02-07 PROCEDURE — 82803 BLOOD GASES ANY COMBINATION: CPT | Performed by: STUDENT IN AN ORGANIZED HEALTH CARE EDUCATION/TRAINING PROGRAM

## 2021-02-07 PROCEDURE — 200N000002 HC R&B ICU UMMC

## 2021-02-07 PROCEDURE — 250N000011 HC RX IP 250 OP 636: Performed by: NURSE PRACTITIONER

## 2021-02-07 PROCEDURE — 250N000012 HC RX MED GY IP 250 OP 636 PS 637: Performed by: INTERNAL MEDICINE

## 2021-02-07 PROCEDURE — 82330 ASSAY OF CALCIUM: CPT | Performed by: INTERNAL MEDICINE

## 2021-02-07 PROCEDURE — 250N000011 HC RX IP 250 OP 636: Performed by: STUDENT IN AN ORGANIZED HEALTH CARE EDUCATION/TRAINING PROGRAM

## 2021-02-07 PROCEDURE — 99291 CRITICAL CARE FIRST HOUR: CPT | Mod: 24 | Performed by: INTERNAL MEDICINE

## 2021-02-07 PROCEDURE — 84100 ASSAY OF PHOSPHORUS: CPT | Performed by: CLINICAL NURSE SPECIALIST

## 2021-02-07 PROCEDURE — 82330 ASSAY OF CALCIUM: CPT | Performed by: STUDENT IN AN ORGANIZED HEALTH CARE EDUCATION/TRAINING PROGRAM

## 2021-02-07 PROCEDURE — 82330 ASSAY OF CALCIUM: CPT | Performed by: CLINICAL NURSE SPECIALIST

## 2021-02-07 PROCEDURE — 250N000009 HC RX 250: Performed by: INTERNAL MEDICINE

## 2021-02-07 PROCEDURE — 80053 COMPREHEN METABOLIC PANEL: CPT | Performed by: CLINICAL NURSE SPECIALIST

## 2021-02-07 PROCEDURE — 250N000009 HC RX 250: Performed by: NURSE PRACTITIONER

## 2021-02-07 PROCEDURE — 99233 SBSQ HOSP IP/OBS HIGH 50: CPT | Performed by: INTERNAL MEDICINE

## 2021-02-07 PROCEDURE — 80048 BASIC METABOLIC PNL TOTAL CA: CPT | Performed by: CLINICAL NURSE SPECIALIST

## 2021-02-07 PROCEDURE — 90947 DIALYSIS REPEATED EVAL: CPT

## 2021-02-07 PROCEDURE — 94645 CONT INHLJ TX EACH ADDL HOUR: CPT

## 2021-02-07 PROCEDURE — 99233 SBSQ HOSP IP/OBS HIGH 50: CPT | Mod: GC | Performed by: INTERNAL MEDICINE

## 2021-02-07 PROCEDURE — 94668 MNPJ CHEST WALL SBSQ: CPT

## 2021-02-07 PROCEDURE — 80197 ASSAY OF TACROLIMUS: CPT | Performed by: STUDENT IN AN ORGANIZED HEALTH CARE EDUCATION/TRAINING PROGRAM

## 2021-02-07 PROCEDURE — 999N001017 HC STATISTIC GLUCOSE BY METER IP

## 2021-02-07 RX ORDER — OLANZAPINE 2.5 MG/1
5 TABLET, FILM COATED ORAL EVERY 24 HOURS
Status: DISCONTINUED | OUTPATIENT
Start: 2021-02-07 | End: 2021-02-09

## 2021-02-07 RX ORDER — OLANZAPINE 2.5 MG/1
5 TABLET, FILM COATED ORAL ONCE
Status: DISCONTINUED | OUTPATIENT
Start: 2021-02-07 | End: 2021-02-07

## 2021-02-07 RX ORDER — OLANZAPINE 2.5 MG/1
5 TABLET, FILM COATED ORAL
Status: DISCONTINUED | OUTPATIENT
Start: 2021-02-07 | End: 2021-02-14

## 2021-02-07 RX ORDER — OLANZAPINE 5 MG/1
5 TABLET, ORALLY DISINTEGRATING ORAL DAILY PRN
Status: DISCONTINUED | OUTPATIENT
Start: 2021-02-07 | End: 2021-02-07

## 2021-02-07 RX ORDER — MAGNESIUM SULFATE HEPTAHYDRATE 40 MG/ML
2 INJECTION, SOLUTION INTRAVENOUS EVERY 6 HOURS PRN
Status: DISCONTINUED | OUTPATIENT
Start: 2021-02-07 | End: 2021-02-09

## 2021-02-07 RX ORDER — POTASSIUM CHLORIDE 29.8 MG/ML
20 INJECTION INTRAVENOUS EVERY 6 HOURS PRN
Status: DISCONTINUED | OUTPATIENT
Start: 2021-02-07 | End: 2021-02-09

## 2021-02-07 RX ADMIN — PIPERACILLIN SODIUM AND TAZOBACTAM SODIUM 4.5 G: 4; .5 INJECTION, POWDER, LYOPHILIZED, FOR SOLUTION INTRAVENOUS at 19:53

## 2021-02-07 RX ADMIN — Medication 10 MG: at 20:02

## 2021-02-07 RX ADMIN — NYSTATIN 1000000 UNITS: 500000 SUSPENSION ORAL at 12:21

## 2021-02-07 RX ADMIN — TACROLIMUS 1.5 MG: 5 CAPSULE ORAL at 18:36

## 2021-02-07 RX ADMIN — NYSTATIN 1000000 UNITS: 500000 SUSPENSION ORAL at 15:55

## 2021-02-07 RX ADMIN — NYSTATIN 1000000 UNITS: 500000 SUSPENSION ORAL at 08:04

## 2021-02-07 RX ADMIN — CALCIUM CHLORIDE, MAGNESIUM CHLORIDE, SODIUM CHLORIDE, SODIUM BICARBONATE, POTASSIUM CHLORIDE AND SODIUM PHOSPHATE DIBASIC DIHYDRATE 12.5 ML/KG/HR: 3.68; 3.05; 6.34; 3.09; .314; .187 INJECTION INTRAVENOUS at 00:55

## 2021-02-07 RX ADMIN — Medication 40 MG: at 08:01

## 2021-02-07 RX ADMIN — HEPARIN SODIUM 5000 UNITS: 5000 INJECTION, SOLUTION INTRAVENOUS; SUBCUTANEOUS at 00:07

## 2021-02-07 RX ADMIN — DOCUSATE SODIUM 50 MG AND SENNOSIDES 8.6 MG 2 TABLET: 8.6; 5 TABLET, FILM COATED ORAL at 20:02

## 2021-02-07 RX ADMIN — CALCIUM CHLORIDE, MAGNESIUM CHLORIDE, SODIUM CHLORIDE, SODIUM BICARBONATE, POTASSIUM CHLORIDE AND SODIUM PHOSPHATE DIBASIC DIHYDRATE 12.5 ML/KG/HR: 3.68; 3.05; 6.34; 3.09; .314; .187 INJECTION INTRAVENOUS at 01:03

## 2021-02-07 RX ADMIN — OLANZAPINE 5 MG: 2.5 TABLET, FILM COATED ORAL at 16:42

## 2021-02-07 RX ADMIN — CALCIUM CHLORIDE, MAGNESIUM CHLORIDE, SODIUM CHLORIDE, SODIUM BICARBONATE, POTASSIUM CHLORIDE AND SODIUM PHOSPHATE DIBASIC DIHYDRATE 12.5 ML/KG/HR: 3.68; 3.05; 6.34; 3.09; .314; .187 INJECTION INTRAVENOUS at 08:22

## 2021-02-07 RX ADMIN — Medication 5 ML: at 09:11

## 2021-02-07 RX ADMIN — POSACONAZOLE 200 MG: 40 SUSPENSION ORAL at 14:01

## 2021-02-07 RX ADMIN — POSACONAZOLE 200 MG: 40 SUSPENSION ORAL at 20:02

## 2021-02-07 RX ADMIN — PREDNISONE 20 MG: 20 TABLET ORAL at 08:04

## 2021-02-07 RX ADMIN — CALCIUM CHLORIDE, MAGNESIUM CHLORIDE, DEXTROSE MONOHYDRATE, LACTIC ACID, SODIUM CHLORIDE, SODIUM BICARBONATE AND POTASSIUM CHLORIDE 12.5 ML/KG/HR: 5.15; 2.03; 22; 5.4; 6.46; 3.09; .157 INJECTION INTRAVENOUS at 20:38

## 2021-02-07 RX ADMIN — CALCIUM CHLORIDE, MAGNESIUM CHLORIDE, SODIUM CHLORIDE, SODIUM BICARBONATE, POTASSIUM CHLORIDE AND SODIUM PHOSPHATE DIBASIC DIHYDRATE 12.5 ML/KG/HR: 3.68; 3.05; 6.34; 3.09; .314; .187 INJECTION INTRAVENOUS at 15:41

## 2021-02-07 RX ADMIN — NYSTATIN 1000000 UNITS: 500000 SUSPENSION ORAL at 20:02

## 2021-02-07 RX ADMIN — Medication 2 MG/HR: at 02:37

## 2021-02-07 RX ADMIN — POSACONAZOLE 200 MG: 40 SUSPENSION ORAL at 08:01

## 2021-02-07 RX ADMIN — HEPARIN SODIUM 5000 UNITS: 5000 INJECTION, SOLUTION INTRAVENOUS; SUBCUTANEOUS at 08:01

## 2021-02-07 RX ADMIN — SULFAMETHOXAZOLE AND TRIMETHOPRIM 580 MG: 200; 40 SUSPENSION ORAL at 08:05

## 2021-02-07 RX ADMIN — BUDESONIDE 0.5 MG: 0.5 INHALANT ORAL at 19:47

## 2021-02-07 RX ADMIN — CALCIUM CHLORIDE, MAGNESIUM CHLORIDE, SODIUM CHLORIDE, SODIUM BICARBONATE, POTASSIUM CHLORIDE AND SODIUM PHOSPHATE DIBASIC DIHYDRATE 12.5 ML/KG/HR: 3.68; 3.05; 6.34; 3.09; .314; .187 INJECTION INTRAVENOUS at 15:42

## 2021-02-07 RX ADMIN — SULFAMETHOXAZOLE AND TRIMETHOPRIM 580 MG: 200; 40 SUSPENSION ORAL at 20:02

## 2021-02-07 RX ADMIN — POLYETHYLENE GLYCOL 3350 17 G: 17 POWDER, FOR SOLUTION ORAL at 08:04

## 2021-02-07 RX ADMIN — LEVALBUTEROL HYDROCHLORIDE 1.25 MG: 1.25 SOLUTION RESPIRATORY (INHALATION) at 16:01

## 2021-02-07 RX ADMIN — CALCIUM CHLORIDE, MAGNESIUM CHLORIDE, DEXTROSE MONOHYDRATE, LACTIC ACID, SODIUM CHLORIDE, SODIUM BICARBONATE AND POTASSIUM CHLORIDE: 5.15; 2.03; 22; 5.4; 6.46; 3.09; .157 INJECTION INTRAVENOUS at 20:38

## 2021-02-07 RX ADMIN — PIPERACILLIN SODIUM AND TAZOBACTAM SODIUM 4.5 G: 4; .5 INJECTION, POWDER, LYOPHILIZED, FOR SOLUTION INTRAVENOUS at 07:59

## 2021-02-07 RX ADMIN — INSULIN GLARGINE 10 UNITS: 100 INJECTION, SOLUTION SUBCUTANEOUS at 08:50

## 2021-02-07 RX ADMIN — HEPARIN SODIUM 5000 UNITS: 5000 INJECTION, SOLUTION INTRAVENOUS; SUBCUTANEOUS at 15:40

## 2021-02-07 RX ADMIN — INSULIN ASPART 1 UNITS: 100 INJECTION, SOLUTION INTRAVENOUS; SUBCUTANEOUS at 15:51

## 2021-02-07 RX ADMIN — PIPERACILLIN SODIUM AND TAZOBACTAM SODIUM 4.5 G: 4; .5 INJECTION, POWDER, LYOPHILIZED, FOR SOLUTION INTRAVENOUS at 02:01

## 2021-02-07 RX ADMIN — CALCIUM CHLORIDE, MAGNESIUM CHLORIDE, SODIUM CHLORIDE, SODIUM BICARBONATE, POTASSIUM CHLORIDE AND SODIUM PHOSPHATE DIBASIC DIHYDRATE 3.41 ML/KG/HR: 3.68; 3.05; 6.34; 3.09; .314; .187 INJECTION INTRAVENOUS at 04:45

## 2021-02-07 RX ADMIN — LEVALBUTEROL HYDROCHLORIDE 1.25 MG: 1.25 SOLUTION RESPIRATORY (INHALATION) at 19:48

## 2021-02-07 RX ADMIN — Medication 10 NG/KG/MIN: at 18:33

## 2021-02-07 RX ADMIN — BUDESONIDE 0.5 MG: 0.5 INHALANT ORAL at 11:50

## 2021-02-07 RX ADMIN — TACROLIMUS 1 MG: 5 CAPSULE ORAL at 08:04

## 2021-02-07 RX ADMIN — PIPERACILLIN SODIUM AND TAZOBACTAM SODIUM 4.5 G: 4; .5 INJECTION, POWDER, LYOPHILIZED, FOR SOLUTION INTRAVENOUS at 14:01

## 2021-02-07 RX ADMIN — LEVALBUTEROL HYDROCHLORIDE 1.25 MG: 1.25 SOLUTION RESPIRATORY (INHALATION) at 11:50

## 2021-02-07 RX ADMIN — MIDAZOLAM 1 MG: 1 INJECTION INTRAMUSCULAR; INTRAVENOUS at 04:47

## 2021-02-07 ASSESSMENT — ACTIVITIES OF DAILY LIVING (ADL)
ADLS_ACUITY_SCORE: 20

## 2021-02-07 ASSESSMENT — MIFFLIN-ST. JEOR: SCORE: 1130

## 2021-02-07 NOTE — PLAN OF CARE
Major Shift Events:  Fentanyl gtt and Versed gtt titrated to maintain adequate sedation. PRN Versed 1mg bolus given 1x for agitation this AM. Opens eyes spontaneously, follows commands, nods head yes/no, and mouthing words. Able to move all extremities. NSR to ST with occasional PACs. Levophed titrated to maintain MAP >65 (range 0.06-0.12). Lungs coarse/diminished. Small amounts creamy secretions via ETT. FiO2 titrated per ABG results. Ongoing inhaled Veletri. No stool output overnight, PRN Senna given. Anuric. CRRT net fluid removal titrated for I=O and per patient tolerance.     Plan: CRRT, wean FiO2 and sedation as tolerated.     For vital signs and complete assessments, please see documentation flowsheets.     Melissa Troy RN on 2/7/2021 at 6:20 AM

## 2021-02-07 NOTE — PROGRESS NOTES
"CRRT STATUS NOTE    DATA:  Time:  6:43 AM  Pressures WNL:  YES  Filter Status:  WDL    Problems Reported/Alarms Noted:  None.    Supplies Present:  YES    ASSESSMENT:  Patient Net Fluid Balance:  +109.01 (2185-9600)  Vital Signs:  /52 (BP Location: Right leg)   Pulse 94   Temp 98.1  F (36.7  C) (Axillary)   Resp 26   Ht 1.6 m (5' 2.99\")   Wt 58.1 kg (128 lb 1.4 oz)   LMP 06/07/2014 (Exact Date)   SpO2 93%   BMI 22.70 kg/m      Labs:    Recent Labs   Lab Test 02/07/21  0400 02/06/21  2209    138   POTASSIUM 4.5 4.5   CHLORIDE 106 108   CO2 26 25   ANIONGAP 6 5   * 119*   BUN 23 25   CR 1.17* 1.16*   CHELSEY 8.4* 8.3*   ica 4.7 4.6   Mag 2.6    Phos 4.0        Goals of Therapy:  intake=output    INTERVENTIONS:   None over the night.    PLAN:  Continue with treatment goal. Call CRRT RN with questions and concerns at [phone]    "

## 2021-02-07 NOTE — PLAN OF CARE
ICU End of Shift Summary. See flowsheets for vital signs and detailed assessment.    Changes this shift: Fentanyl weaned down from 75 to 50 mcg/h versed remains at 2 mg/hr and levophed weaned off this morning after MAP goal was decreased to 60.  However MAP has been >65 consistently.  Pt tearful this morning started on Zyprexa 5 mg daily & prn also visited with . Increased fluid removal via CRRT  tolerating well current net negative 662 ml since MN.    Plan:  Continue to wean sedation as tolerated. Continue CRRT per order and notify the team of any changes in status.    Problem: Respiratory Compromise (Pneumonia)  Goal: Effective Oxygenation and Ventilation  Intervention: Promote Airway Secretion Clearance  Recent Flowsheet Documentation  Taken 2/7/2021 1600 by Domingo Damon RN    Problem: Hypertension Comorbidity  Goal: Blood Pressure in Desired Range  Outcome: Improving     Problem: ARDS (Acute Respiratory Distress Syndrome)  Goal: Effective Oxygenation  Outcome: Improving

## 2021-02-07 NOTE — PROGRESS NOTES
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 Nephrology consulted for dialysis needs. CRRT started  2/4/21       Assessment & Recommendations:     RECOMMENDATIONS  Continue CRRT  -- Though she is at her admission weight , she still has some minimal peripheral edema and in addition has rales bilaterally . CT  Chest findings consistent with ARDS 2/2 PJP, progressed from prior. Will attempt to challenge her weight with aim to achieve decreased fluids in her lungs   -- will switch to 2 K baths with K at 5.2      ESRD  Runs at [place] through [facility] group.  Has atypical HD schedule of Monday and Thursday, has AVF with partial graft which has been challenging to access per RN's.    Due to hemodynamic instability requiring pressors, initiated on CRRT on 2/4/2021  Access: -- Has L arm AVF/graft, somewhat challenging access.               -- Current Access : Left femoral     BP : 114/55  Off pressors   Volume status- Minimally hypervolemic .   Satting 93% with FiO2 of 60% and PEEP of 8, unchanged   Net negative 278 ml  High obligate intake: 2.6 L   Admission weight 1/24 - 58.6 kg -->  58.7 kg ( 2/6) --> 58.1 kg ( 2/7)   CT chest shows ARDS 2/2 PJP, progressed from prior.  Chronic empyema in LLL  O/e Has rales bilaterally     Electrolytes/pH-K 5.2 --> will switch her to 2 K bath bicarb 24.ABG : 7.39/43/67  fio2 55%     Ca/phos/pth-Ca 8.5 with Albumin 2 . Mg and Phos normal       Anemia-Hgb 7.8 stable. Management per team.  Checked iron stores and sats 36% on 2/3.     Nutrition-Nutren TF.      Fungal empyema  PJP pneumonia  Left mainstem  "bronchial stenosis   fungitel positive   Zosyn  Posaconazole, bactrim, vancomycin,valcyte    Seen and discussed with Dr Gamble     Recommendations were communicated to primary team via verbal communication.       Interval History :   Remains intubated   She remains anuric   Weaning off pressors    Review of Systems:   I reviewed the following systems:  ROS not done due to vent    Physical Exam:   I/O last 3 completed shifts:  In: 2475.88 [I.V.:933.88; NG/GT:462]  Out: 2504 [Other:2504]   BP (!) 177/90   Pulse 108   Temp 98.1  F (36.7  C) (Axillary)   Resp 29   Ht 1.6 m (5' 2.99\")   Wt 58.1 kg (128 lb 1.4 oz)   LMP 06/07/2014 (Exact Date)   SpO2 92%   BMI 22.70 kg/m       GENERAL APPEARANCE: Re-intubated, proned.   EYES: no scleral icterus  Endo: no moon facies  Pulmonary: Intubated, proned, equal expansion.    CV: regular rhythm, normal rate - Edema present  GI: soft, non distended  MS: no evidence of inflammation in joints, no muscle tenderness  :  Basilio present  SKIN: warm, dry, +1 edema.    NEURO: drowsy .  Access: -- Has L arm AVF/graft, somewhat challenging access.               -- Current Access : Left femoral       Labs:   All labs reviewed by me  Electrolytes/Renal -   Recent Labs   Lab Test 02/07/21  1030 02/07/21  0400 02/06/21  2209 02/06/21  1600 02/06/21  0345 02/06/21  0345    137 138 137   < > 137   POTASSIUM 4.2 4.5 4.5 4.9   < > 3.9   CHLORIDE 106 106 108 106   < > 106   CO2 26 26 25 26   < > 24   BUN 23 23 25 27   < > 30   CR 1.11* 1.17* 1.16* 1.26*   < > 1.59*   * 118* 119* 175*   < > 119*   CHELSEY 8.5 8.4* 8.3* 8.3*   < > 7.7*   MAG  --  2.6*  --  2.5*  --  2.3   PHOS  --  4.0  --  4.0  --  3.9    < > = values in this interval not displayed.       CBC -   Recent Labs   Lab Test 02/07/21  0400 02/06/21  0345 02/05/21  0321   WBC 13.1* 14.2* 10.8   HGB 7.8* 8.1* 7.7*   * 154 138*       LFTs -   Recent Labs   Lab Test 02/07/21  0400 02/06/21  0345 02/05/21 0321 "   ALKPHOS 166* 142 146   BILITOTAL 0.4 0.4 0.4   ALT 31 32 28   AST 3 3 6   PROTTOTAL 6.0* 6.0* 5.6*   ALBUMIN 2.0* 1.9* 1.9*       Iron Panel -   Recent Labs   Lab Test 02/03/21  0415 12/13/18  1033 08/01/18  0921   IRON 51 16* 93   IRONSAT 36 7* 37   YOLA  --  302* 571*           Current Medications:    B and C vitamin Complex with folic acid  5 mL Oral or Feeding Tube Daily     budesonide  0.5 mg Nebulization BID     heparin ANTICOAGULANT  5,000 Units Subcutaneous Q8H     insulin aspart  1-12 Units Subcutaneous Q4H     insulin glargine  10 Units Subcutaneous QAM AC     levalbuterol  1.25 mg Nebulization 4x Daily     nystatin  1,000,000 Units Mouth/Throat 4x Daily     OLANZapine  5 mg Oral or Feeding Tube Q24H     pantoprazole  40 mg Oral QAM AC     piperacillin-tazobactam  4.5 g Intravenous Q6H     polyethylene glycol  17 g Oral or Feeding Tube Daily     posaconazole  200 mg Oral TID     [Held by provider] predniSONE  2.5 mg Oral or Feeding Tube QPM     predniSONE  20 mg Oral Daily     [Held by provider] predniSONE  5 mg Oral or Feeding Tube QAM     sulfamethoxazole-trimethoprim  580 mg Oral or Feeding Tube BID     tacrolimus  1 mg Oral or Feeding Tube Q24H     tacrolimus  1.5 mg Oral or Feeding Tube Q24H     valGANciclovir  450 mg Oral Once per day on Mon Thu       dextrose       CRRT replacement solution 12.5 mL/kg/hr (02/07/21 0103)     epoprostenol (VELETRI) 20 mcg/mL in sterile water inhalation solution 10 ng/kg/min (02/07/21 0102)     fentaNYL 75 mcg/hr (02/07/21 1000)     midazolam 2 mg/hr (02/07/21 1000)     - MEDICATION INSTRUCTIONS -       norepinephrine Stopped (02/07/21 0845)     CRRT replacement solution 3.413 mL/kg/hr (02/07/21 0445)     CRRT replacement solution 12.5 mL/kg/hr (02/07/21 0822)     propofol (DIPRIVAN) infusion Stopped (02/06/21 1327)     sodium chloride 10 mL/hr at 02/07/21 0700     vasopressin Stopped (02/06/21 1047)

## 2021-02-07 NOTE — PROGRESS NOTES
"SPIRITUAL HEALTH SERVICES  SPIRITUAL ASSESSMENT Progress Note  [facility] 4C   ON-CALL VISIT    REFERRAL SOURCE: [facility] consult for emotional support.    Pt Kecia identifies as [place]. Her  Ranjan was present with her on the unit.     Ranjan stated that, \"I have put her in God's Hands a few days ago\". He clarified that he hopes that she will recover and be of good health.     Ranjan welcomed prayers from me for Kecia. We prayed together and Ranjan also prayed with \"The Lord's Prayer\" as he held Kecia's hand and wept.     PLAN: Unit  will be notified for follow up.    Maximino Garza  Lead Jain   Pager [phone]    [facility] remains available 24/7 for emergent requests/referrals, either by having the switchboard page the on-call  or by entering an ASAP/STAT consult in Epic (this will also page the on-call ).    "

## 2021-02-07 NOTE — PROGRESS NOTES
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 She was transferred from OSH ICU 1/22 and intubated and requiring vasopressors. Found to have PCP pneumonia.     CHANGES and MAJOR THINGS TODAY:   - Remain supine   - CRRT initiated 2/4, goal net negative ~1L to achieve dry weight  - Continue treatment with bactrim, prednisone taper  - Continue zosyn empiric treatment  - Will reduce MAP goal to 60, attempt to wean levo  - Will add zyprexa scheduled and PRN for delirium      PLAN:     Neuro:  # Pain and sedation  - Continue propofol, fentanyl, and versed ggts  - Fentanyl and versed PRNs ordered    # Delirium   - Will add PRN and scheduled zyprexa       #Insomnia  - Melatonin      #Unequal pupils  Patient with alternating fixed and dilated pupils. CTH unremarkable. Neuro exam otherwise unremarkable  - Optho consulted, no acute concerns     Pulmonary:  #Acute Hypoxic Respiratory Failure,  Suspected 2/2 PJP initially. OSH CT 1/23 + bilateral ground glass opacities and consolidative lung infiltrates centrally distributed to the upper lobes and RLL, . Covid-19 negative X3 at OSH. Given acute and severe decompensation, concern for secondary pneumonia. 2/4 CT chest which displayed worsening consolidations consistent with progression of pneumocystis with the possibility of superimposed secondary pneumonia.  - PJP PCR positive, returned on 1/28  - Continue micro work up and management as below  - Continue half strength flolan, consider weaning as patient tolerates   -  Continue diuresis as below  - Maintain supine position  - Care conference 2/5, continue restorative measures, DNR/comfort cares conversation ongoing    Ventilation Mode: CMV/AC  (Continuous Mandatory Ventilation/ Assist Control)  FiO2 (%): 60 %  Rate Set (breaths/minute): 24 breaths/min  Tidal Volume Set (mL): 320 mL  PEEP (cm H2O): 8 cmH2O  Oxygen Concentration (%): (S) 55 % (found on rounds)  Resp: 27     #S/P Bilateral Lung Transplant   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  - Positive PJP PCR on 1/28 with positive fungitel   - Continue PTA tacrolimus  - Prednisone taper, holding home dose for now  - Pulmonary lung transplant team consulted and following, appreciate recs     Cardiovascular:  #Shock  #Hx Hypertension  Patient ith hypotension likely related to septic shock with some contribution of propofol. Last ECHO 10/21/20 with EF 60-65%, normal LV & RV function.   - Continue levo, weaning as tolerated, reduce MAP goal to 60  - Hold PTA coreg and amlodipine      # Atrial fibrillation w/ RVR  Patient with pAF with rates into 130-40s. Likely in the setting of acute pulmoary illness. HR now < 100 following intubation.     GI/Nutrition:  # Portal HTN  Liver biopsy positive for congestive hepatopathy. Previous had ascites thought to be r/t elevated right sided heart pressures. Last saw GI on 12/21/20 and patient endorsed that her ascites is no longer present. Etiology unclear.   - NTD     # Nutrition  - NJT placed by rads, continue TF     # Constipation  Patient reportedly has history of constipation with bactrim use, now stooling  - Scheduled miralax ordered  - PRN dulcolax and senna-doc ordered     Renal/Fluids/Electrolytes:  # ESRD on iHD twice weekly (M/Th).   # Anion gap metabolic acidosis r/t ESRD, uremia-  Resolved  # On CRRT  Outpatient dry body weight 56.5 kg.  - Nephrology Consulted   - CRRT initiated 2/4, goal net negative 0-1 L to achieve dry weight   - I&O  - Daily Weights   - Trend BMP      # Hypokalemia  - Dialysis as above     Endocrine:  # Seronegative RA with Raynaud's   - NTD, monitor   # Hyperglycemia, steroid induced  - High intensity sliding scale insulin  - 10 units glargine daily     ID:  # Sepsis with Shock  Suspected related to PCP pneumonia. OSH CT 1/23 + bilateral ground glass opacities and consolidative lung infiltrates centrally distributed to the upper lobes and RLL. Covid-19 negative X3 at OSH. Procalcitonin negative. Urine strep, CMV, and RPP negative. Given acute and severe decompensation, concern for secondary pneumonia. 2/4 CT chest which displayed worsening consolidations consistent with progression of pneumocystis with the possibility of superimposed secondary pneumonia.  - PJP PCR positive, fungitell positive as well  - Continue treatment with bactrim, prednisone taper  - Continue zosyn empiric treatment, plan for 10 days (ending 2/12)   - s/p thora 1/25, fluid exudative, negative w/u  - s/p 7 day course of empiric ceftriaxone      # Chronic/Stable right aspergillus empyema   - Continue posaconazole, treatment dosing, will monitor QTc  - Discussed discontinuing posa with ID, recommended not discontinuing during acute illness     Hematology:    # Anemia r/t renal disease, chronic stable   # Thrombocytopenia r/t critical illness, resolved   # Leukocytosis, infection/steroids  Takes aranesp 25 mcg every four weeks, last dose 01/14. Next dose 2/11  - Daily CBC     Musculoskeletal:  #Weakness/Deconditioning of critical illness  - PT/OT consulted     Skin:  # Dermatomyositis antitryptase syndrome   - Noted, NTD     General Cares/Prophylaxis:    DVT Prophylaxis: Heparin SQ  GI Prophylaxis: PPI  Restraints: Not indicated  Family Communication: Plan to update  at patient bedside.   Code  Status: Full      Lines/tubes/drains:  - HD fistula left forearm  - CVC RIJ  - PIV  - Will replace A-line  - NJT  - Rectal pouch  - ETT    Disposition:  - Medical ICU    Patient seen and findings/plan discussed with medical ICU staff, Dr. Vale.    Tomas Rolle MD  MICU Service  Internal Medicine PGY-1  P. [id number]    ====================================  INTERVAL HISTORY:   No acute events. Tolerated supine overnight. Patient showing evidence of confusion and panic consistent with prior episodes of delirium per .    OBJECTIVE:   1. VITAL SIGNS:   Temp:  [97.4  F (36.3  C)-98.2  F (36.8  C)] 98.1  F (36.7  C)  Pulse:  [] 98  Resp:  [24-29] 27  BP: (123)/(52) 123/52  MAP:  [61 mmHg-90 mmHg] 68 mmHg  Arterial Line BP: ()/(40-61) 91/47  FiO2 (%):  [50 %-60 %] 60 %  SpO2:  [90 %-98 %] 93 %  Ventilation Mode: CMV/AC  (Continuous Mandatory Ventilation/ Assist Control)  FiO2 (%): 60 %  Rate Set (breaths/minute): 24 breaths/min  Tidal Volume Set (mL): 320 mL  PEEP (cm H2O): 8 cmH2O  Oxygen Concentration (%): (S) 55 % (found on rounds)  Resp: 27    2. INTAKE/ OUTPUT:   I/O last 3 completed shifts:  In: 2475.88 [I.V.:933.88; NG/GT:462]  Out: 2504 [Other:2504]    3. PHYSICAL EXAMINATION:  General: Laying in bed. No acute distress. Intubated, sedated, supine position.  Neuro: Sedated, eyes open to voice, following simple commands. no notable clonus  Pulm/Resp: Diffuse coarse breath sounds, end expiratory wheezing, relatively unchanged  CV: Pulses intact, irregularly irregular   Abdomen: soft, non-distended   Incisions/Skin: Left AV fistula with thrill and bruit    4. LABS:   Arterial Blood Gases   Recent Labs   Lab 02/07/21  0400 02/06/21  0800 02/06/21  0345 02/05/21  1548   PH 7.39 7.38 7.38 7.39   PCO2 43 42 42 40   PO2 67* 93 73* 97   HCO3 26 25 25 24     Complete Blood Count   Recent Labs   Lab 02/07/21  0400 02/06/21  0345 02/05/21  0321 02/04/21  0310   WBC 13.1* 14.2* 10.8 17.3*   HGB 7.8* 8.1* 7.7*  8.9*   * 154 138* 203     Basic Metabolic Panel  Recent Labs   Lab 02/07/21  0400 02/06/21  2209 02/06/21  1600 02/06/21  1000    138 137 136   POTASSIUM 4.5 4.5 4.9 4.0   CHLORIDE 106 108 106 105   CO2 26 25 26 24   BUN 23 25 27 27   CR 1.17* 1.16* 1.26* 1.35*   * 119* 175* 163*     Liver Function Tests  Recent Labs   Lab 02/07/21  0400 02/06/21  0345 02/05/21  0321 02/04/21  0310   AST 3 3 6 9   ALT 31 32 28 33   ALKPHOS 166* 142 146 174*   BILITOTAL 0.4 0.4 0.4 0.4   ALBUMIN 2.0* 1.9* 1.9* 1.9*     Coagulation Profile  No lab results found in last 7 days.    5. RADIOLOGY:   No results found for this or any previous visit (from the past 24 hour(s)).

## 2021-02-08 ENCOUNTER — APPOINTMENT (OUTPATIENT)
Dept: PHYSICAL THERAPY | Facility: CLINIC | Age: 61
End: 2021-02-08
Attending: INTERNAL MEDICINE
Payer: COMMERCIAL

## 2021-02-08 LAB
ALBUMIN SERPL-MCNC: 2 G/DL (ref 3.4–5)
ALP SERPL-CCNC: 178 U/L (ref 40–150)
ALT SERPL W P-5'-P-CCNC: 29 U/L (ref 0–50)
ANION GAP SERPL CALCULATED.3IONS-SCNC: 3 MMOL/L (ref 3–14)
ANION GAP SERPL CALCULATED.3IONS-SCNC: 6 MMOL/L (ref 3–14)
AST SERPL W P-5'-P-CCNC: 4 U/L (ref 0–45)
BASE EXCESS BLDA CALC-SCNC: 2.4 MMOL/L
BASE EXCESS BLDA CALC-SCNC: 2.7 MMOL/L
BASE EXCESS BLDA CALC-SCNC: 3.2 MMOL/L
BILIRUB SERPL-MCNC: 0.4 MG/DL (ref 0.2–1.3)
BUN SERPL-MCNC: 21 MG/DL (ref 7–30)
BUN SERPL-MCNC: 24 MG/DL (ref 7–30)
CA-I BLD-MCNC: 5 MG/DL (ref 4.4–5.2)
CA-I BLD-MCNC: 5.1 MG/DL (ref 4.4–5.2)
CA-I SERPL ISE-MCNC: 4.6 MG/DL (ref 4.4–5.2)
CA-I SERPL ISE-MCNC: 5 MG/DL (ref 4.4–5.2)
CALCIUM SERPL-MCNC: 8.5 MG/DL (ref 8.5–10.1)
CALCIUM SERPL-MCNC: 8.8 MG/DL (ref 8.5–10.1)
CALCIUM SERPL-MCNC: 8.9 MG/DL (ref 8.5–10.1)
CALCIUM SERPL-MCNC: 9 MG/DL (ref 8.5–10.1)
CHLORIDE SERPL-SCNC: 104 MMOL/L (ref 94–109)
CHLORIDE SERPL-SCNC: 105 MMOL/L (ref 94–109)
CHLORIDE SERPL-SCNC: 106 MMOL/L (ref 94–109)
CHLORIDE SERPL-SCNC: 107 MMOL/L (ref 94–109)
CO2 SERPL-SCNC: 26 MMOL/L (ref 20–32)
CO2 SERPL-SCNC: 27 MMOL/L (ref 20–32)
CO2 SERPL-SCNC: 27 MMOL/L (ref 20–32)
CO2 SERPL-SCNC: 29 MMOL/L (ref 20–32)
CREAT SERPL-MCNC: 0.96 MG/DL (ref 0.52–1.04)
CREAT SERPL-MCNC: 1.01 MG/DL (ref 0.52–1.04)
CREAT SERPL-MCNC: 1.06 MG/DL (ref 0.52–1.04)
CREAT SERPL-MCNC: 1.09 MG/DL (ref 0.52–1.04)
ERYTHROCYTE [DISTWIDTH] IN BLOOD BY AUTOMATED COUNT: 17.2 % (ref 10–15)
GFR SERPL CREATININE-BSD FRML MDRD: 56 ML/MIN/{1.73_M2}
GFR SERPL CREATININE-BSD FRML MDRD: 58 ML/MIN/{1.73_M2}
GFR SERPL CREATININE-BSD FRML MDRD: 61 ML/MIN/{1.73_M2}
GFR SERPL CREATININE-BSD FRML MDRD: 65 ML/MIN/{1.73_M2}
GLUCOSE BLDC GLUCOMTR-MCNC: 134 MG/DL (ref 70–99)
GLUCOSE BLDC GLUCOMTR-MCNC: 134 MG/DL (ref 70–99)
GLUCOSE BLDC GLUCOMTR-MCNC: 143 MG/DL (ref 70–99)
GLUCOSE BLDC GLUCOMTR-MCNC: 144 MG/DL (ref 70–99)
GLUCOSE BLDC GLUCOMTR-MCNC: 82 MG/DL (ref 70–99)
GLUCOSE SERPL-MCNC: 141 MG/DL (ref 70–99)
GLUCOSE SERPL-MCNC: 144 MG/DL (ref 70–99)
GLUCOSE SERPL-MCNC: 146 MG/DL (ref 70–99)
GLUCOSE SERPL-MCNC: 156 MG/DL (ref 70–99)
HCO3 BLD-SCNC: 28 MMOL/L (ref 21–28)
HCO3 BLD-SCNC: 28 MMOL/L (ref 21–28)
HCO3 BLD-SCNC: 29 MMOL/L (ref 21–28)
HCT VFR BLD AUTO: 23.6 % (ref 35–47)
HGB BLD-MCNC: 7.5 G/DL (ref 11.7–15.7)
MAGNESIUM SERPL-MCNC: 2.3 MG/DL (ref 1.6–2.3)
MAGNESIUM SERPL-MCNC: 2.4 MG/DL (ref 1.6–2.3)
MCH RBC QN AUTO: 30 PG (ref 26.5–33)
MCHC RBC AUTO-ENTMCNC: 31.8 G/DL (ref 31.5–36.5)
MCV RBC AUTO: 94 FL (ref 78–100)
O2/TOTAL GAS SETTING VFR VENT: 60 %
PCO2 BLD: 43 MM HG (ref 35–45)
PCO2 BLD: 45 MM HG (ref 35–45)
PCO2 BLD: 47 MM HG (ref 35–45)
PH BLD: 7.39 PH (ref 7.35–7.45)
PH BLD: 7.4 PH (ref 7.35–7.45)
PH BLD: 7.41 PH (ref 7.35–7.45)
PHOSPHATE SERPL-MCNC: 2.7 MG/DL (ref 2.5–4.5)
PHOSPHATE SERPL-MCNC: 2.8 MG/DL (ref 2.5–4.5)
PLATELET # BLD AUTO: 101 10E9/L (ref 150–450)
PO2 BLD: 60 MM HG (ref 80–105)
PO2 BLD: 72 MM HG (ref 80–105)
PO2 BLD: 83 MM HG (ref 80–105)
POTASSIUM SERPL-SCNC: 3.9 MMOL/L (ref 3.4–5.3)
POTASSIUM SERPL-SCNC: 4.1 MMOL/L (ref 3.4–5.3)
POTASSIUM SERPL-SCNC: 4.5 MMOL/L (ref 3.4–5.3)
POTASSIUM SERPL-SCNC: 4.5 MMOL/L (ref 3.4–5.3)
PROT SERPL-MCNC: 6.1 G/DL (ref 6.8–8.8)
RBC # BLD AUTO: 2.5 10E12/L (ref 3.8–5.2)
SODIUM SERPL-SCNC: 136 MMOL/L (ref 133–144)
SODIUM SERPL-SCNC: 137 MMOL/L (ref 133–144)
SODIUM SERPL-SCNC: 139 MMOL/L (ref 133–144)
SODIUM SERPL-SCNC: 139 MMOL/L (ref 133–144)
WBC # BLD AUTO: 9.2 10E9/L (ref 4–11)

## 2021-02-08 PROCEDURE — 82330 ASSAY OF CALCIUM: CPT | Performed by: INTERNAL MEDICINE

## 2021-02-08 PROCEDURE — 999N000015 HC STATISTIC ARTERIAL MONITORING DAILY

## 2021-02-08 PROCEDURE — 250N000013 HC RX MED GY IP 250 OP 250 PS 637: Performed by: NURSE PRACTITIONER

## 2021-02-08 PROCEDURE — 999N000157 HC STATISTIC RCP TIME EA 10 MIN

## 2021-02-08 PROCEDURE — 250N000009 HC RX 250: Performed by: NURSE PRACTITIONER

## 2021-02-08 PROCEDURE — 94640 AIRWAY INHALATION TREATMENT: CPT | Mod: 76

## 2021-02-08 PROCEDURE — 272N000078 HC NUTRITION PRODUCT INTERMEDIATE LITER

## 2021-02-08 PROCEDURE — 94645 CONT INHLJ TX EACH ADDL HOUR: CPT

## 2021-02-08 PROCEDURE — 250N000013 HC RX MED GY IP 250 OP 250 PS 637: Performed by: STUDENT IN AN ORGANIZED HEALTH CARE EDUCATION/TRAINING PROGRAM

## 2021-02-08 PROCEDURE — 99291 CRITICAL CARE FIRST HOUR: CPT | Mod: GC | Performed by: INTERNAL MEDICINE

## 2021-02-08 PROCEDURE — 250N000013 HC RX MED GY IP 250 OP 250 PS 637: Performed by: INTERNAL MEDICINE

## 2021-02-08 PROCEDURE — 82803 BLOOD GASES ANY COMBINATION: CPT | Performed by: STUDENT IN AN ORGANIZED HEALTH CARE EDUCATION/TRAINING PROGRAM

## 2021-02-08 PROCEDURE — 250N000011 HC RX IP 250 OP 636: Performed by: NURSE PRACTITIONER

## 2021-02-08 PROCEDURE — 94640 AIRWAY INHALATION TREATMENT: CPT

## 2021-02-08 PROCEDURE — 99233 SBSQ HOSP IP/OBS HIGH 50: CPT | Performed by: STUDENT IN AN ORGANIZED HEALTH CARE EDUCATION/TRAINING PROGRAM

## 2021-02-08 PROCEDURE — 82330 ASSAY OF CALCIUM: CPT | Performed by: STUDENT IN AN ORGANIZED HEALTH CARE EDUCATION/TRAINING PROGRAM

## 2021-02-08 PROCEDURE — 93010 ELECTROCARDIOGRAM REPORT: CPT | Performed by: INTERNAL MEDICINE

## 2021-02-08 PROCEDURE — 99232 SBSQ HOSP IP/OBS MODERATE 35: CPT | Mod: GC | Performed by: INTERNAL MEDICINE

## 2021-02-08 PROCEDURE — 250N000009 HC RX 250: Performed by: STUDENT IN AN ORGANIZED HEALTH CARE EDUCATION/TRAINING PROGRAM

## 2021-02-08 PROCEDURE — 94668 MNPJ CHEST WALL SBSQ: CPT

## 2021-02-08 PROCEDURE — 94003 VENT MGMT INPAT SUBQ DAY: CPT

## 2021-02-08 PROCEDURE — 999N000157 HC STATISTIC RCP TIME EA 10 MIN: Mod: 76

## 2021-02-08 PROCEDURE — 83735 ASSAY OF MAGNESIUM: CPT | Performed by: INTERNAL MEDICINE

## 2021-02-08 PROCEDURE — 84100 ASSAY OF PHOSPHORUS: CPT | Performed by: INTERNAL MEDICINE

## 2021-02-08 PROCEDURE — 80053 COMPREHEN METABOLIC PANEL: CPT | Performed by: INTERNAL MEDICINE

## 2021-02-08 PROCEDURE — 258N000003 HC RX IP 258 OP 636: Performed by: STUDENT IN AN ORGANIZED HEALTH CARE EDUCATION/TRAINING PROGRAM

## 2021-02-08 PROCEDURE — 85027 COMPLETE CBC AUTOMATED: CPT | Performed by: INTERNAL MEDICINE

## 2021-02-08 PROCEDURE — 97530 THERAPEUTIC ACTIVITIES: CPT | Mod: GP

## 2021-02-08 PROCEDURE — 200N000002 HC R&B ICU UMMC

## 2021-02-08 PROCEDURE — 250N000011 HC RX IP 250 OP 636: Performed by: STUDENT IN AN ORGANIZED HEALTH CARE EDUCATION/TRAINING PROGRAM

## 2021-02-08 PROCEDURE — 250N000012 HC RX MED GY IP 250 OP 636 PS 637: Performed by: INTERNAL MEDICINE

## 2021-02-08 PROCEDURE — 93005 ELECTROCARDIOGRAM TRACING: CPT

## 2021-02-08 PROCEDURE — 999N001017 HC STATISTIC GLUCOSE BY METER IP

## 2021-02-08 PROCEDURE — 80048 BASIC METABOLIC PNL TOTAL CA: CPT | Performed by: INTERNAL MEDICINE

## 2021-02-08 PROCEDURE — 90947 DIALYSIS REPEATED EVAL: CPT

## 2021-02-08 PROCEDURE — 250N000009 HC RX 250: Performed by: INTERNAL MEDICINE

## 2021-02-08 RX ORDER — POLYETHYLENE GLYCOL 3350 17 G/17G
17 POWDER, FOR SOLUTION ORAL 2 TIMES DAILY
Status: DISCONTINUED | OUTPATIENT
Start: 2021-02-08 | End: 2021-02-14

## 2021-02-08 RX ORDER — PIPERACILLIN SODIUM, TAZOBACTAM SODIUM 2; .25 G/10ML; G/10ML
2.25 INJECTION, POWDER, LYOPHILIZED, FOR SOLUTION INTRAVENOUS EVERY 6 HOURS
Status: COMPLETED | OUTPATIENT
Start: 2021-02-09 | End: 2021-02-12

## 2021-02-08 RX ORDER — OLANZAPINE 2.5 MG/1
5 TABLET, FILM COATED ORAL ONCE
Status: COMPLETED | OUTPATIENT
Start: 2021-02-08 | End: 2021-02-08

## 2021-02-08 RX ORDER — SULFAMETHOXAZOLE AND TRIMETHOPRIM 200; 40 MG/5ML; MG/5ML
295 SUSPENSION ORAL 2 TIMES DAILY
Status: DISCONTINUED | OUTPATIENT
Start: 2021-02-09 | End: 2021-02-15

## 2021-02-08 RX ORDER — DEXMEDETOMIDINE HYDROCHLORIDE 4 UG/ML
.2-1.2 INJECTION, SOLUTION INTRAVENOUS CONTINUOUS
Status: DISCONTINUED | OUTPATIENT
Start: 2021-02-08 | End: 2021-02-14

## 2021-02-08 RX ADMIN — POSACONAZOLE 200 MG: 40 SUSPENSION ORAL at 13:29

## 2021-02-08 RX ADMIN — INSULIN GLARGINE 10 UNITS: 100 INJECTION, SOLUTION SUBCUTANEOUS at 08:21

## 2021-02-08 RX ADMIN — PIPERACILLIN SODIUM AND TAZOBACTAM SODIUM 4.5 G: 4; .5 INJECTION, POWDER, LYOPHILIZED, FOR SOLUTION INTRAVENOUS at 02:13

## 2021-02-08 RX ADMIN — BUDESONIDE 0.5 MG: 0.5 INHALANT ORAL at 21:06

## 2021-02-08 RX ADMIN — INSULIN ASPART 1 UNITS: 100 INJECTION, SOLUTION INTRAVENOUS; SUBCUTANEOUS at 20:37

## 2021-02-08 RX ADMIN — LEVALBUTEROL HYDROCHLORIDE 1.25 MG: 1.25 SOLUTION RESPIRATORY (INHALATION) at 15:58

## 2021-02-08 RX ADMIN — OLANZAPINE 5 MG: 2.5 TABLET, FILM COATED ORAL at 07:42

## 2021-02-08 RX ADMIN — HEPARIN SODIUM 5000 UNITS: 5000 INJECTION, SOLUTION INTRAVENOUS; SUBCUTANEOUS at 00:09

## 2021-02-08 RX ADMIN — CALCIUM CHLORIDE, MAGNESIUM CHLORIDE, DEXTROSE MONOHYDRATE, LACTIC ACID, SODIUM CHLORIDE, SODIUM BICARBONATE AND POTASSIUM CHLORIDE 12.5 ML/KG/HR: 5.15; 2.03; 22; 5.4; 6.46; 3.09; .157 INJECTION INTRAVENOUS at 04:34

## 2021-02-08 RX ADMIN — LEVALBUTEROL HYDROCHLORIDE 1.25 MG: 1.25 SOLUTION RESPIRATORY (INHALATION) at 07:48

## 2021-02-08 RX ADMIN — HEPARIN SODIUM 5000 UNITS: 5000 INJECTION, SOLUTION INTRAVENOUS; SUBCUTANEOUS at 07:38

## 2021-02-08 RX ADMIN — NYSTATIN 1000000 UNITS: 500000 SUSPENSION ORAL at 11:36

## 2021-02-08 RX ADMIN — PREDNISONE 20 MG: 20 TABLET ORAL at 07:38

## 2021-02-08 RX ADMIN — CALCIUM CHLORIDE, MAGNESIUM CHLORIDE, DEXTROSE MONOHYDRATE, LACTIC ACID, SODIUM CHLORIDE, SODIUM BICARBONATE AND POTASSIUM CHLORIDE 12.5 ML/KG/HR: 5.15; 2.03; 22; 5.4; 6.46; 3.09; .157 INJECTION INTRAVENOUS at 11:39

## 2021-02-08 RX ADMIN — SULFAMETHOXAZOLE AND TRIMETHOPRIM 580 MG: 200; 40 SUSPENSION ORAL at 07:38

## 2021-02-08 RX ADMIN — TACROLIMUS 1 MG: 5 CAPSULE ORAL at 07:41

## 2021-02-08 RX ADMIN — PIPERACILLIN SODIUM AND TAZOBACTAM SODIUM 4.5 G: 4; .5 INJECTION, POWDER, LYOPHILIZED, FOR SOLUTION INTRAVENOUS at 07:39

## 2021-02-08 RX ADMIN — POSACONAZOLE 200 MG: 40 SUSPENSION ORAL at 20:39

## 2021-02-08 RX ADMIN — DEXMEDETOMIDINE 0.6 MCG/KG/HR: 100 INJECTION, SOLUTION, CONCENTRATE INTRAVENOUS at 18:32

## 2021-02-08 RX ADMIN — OLANZAPINE 5 MG: 2.5 TABLET, FILM COATED ORAL at 23:04

## 2021-02-08 RX ADMIN — Medication 40 MG: at 07:41

## 2021-02-08 RX ADMIN — NYSTATIN 1000000 UNITS: 500000 SUSPENSION ORAL at 16:25

## 2021-02-08 RX ADMIN — Medication 5 ML: at 07:38

## 2021-02-08 RX ADMIN — HEPARIN SODIUM 5000 UNITS: 5000 INJECTION, SOLUTION INTRAVENOUS; SUBCUTANEOUS at 17:11

## 2021-02-08 RX ADMIN — TACROLIMUS 1.5 MG: 5 CAPSULE ORAL at 18:26

## 2021-02-08 RX ADMIN — NYSTATIN 1000000 UNITS: 500000 SUSPENSION ORAL at 07:38

## 2021-02-08 RX ADMIN — Medication 50 MCG/HR: at 05:47

## 2021-02-08 RX ADMIN — PIPERACILLIN SODIUM AND TAZOBACTAM SODIUM 4.5 G: 4; .5 INJECTION, POWDER, LYOPHILIZED, FOR SOLUTION INTRAVENOUS at 20:38

## 2021-02-08 RX ADMIN — NYSTATIN 1000000 UNITS: 500000 SUSPENSION ORAL at 20:39

## 2021-02-08 RX ADMIN — PIPERACILLIN SODIUM AND TAZOBACTAM SODIUM 4.5 G: 4; .5 INJECTION, POWDER, LYOPHILIZED, FOR SOLUTION INTRAVENOUS at 13:29

## 2021-02-08 RX ADMIN — LEVALBUTEROL HYDROCHLORIDE 1.25 MG: 1.25 SOLUTION RESPIRATORY (INHALATION) at 21:06

## 2021-02-08 RX ADMIN — DOCUSATE SODIUM 50 MG AND SENNOSIDES 8.6 MG 2 TABLET: 8.6; 5 TABLET, FILM COATED ORAL at 07:37

## 2021-02-08 RX ADMIN — SULFAMETHOXAZOLE AND TRIMETHOPRIM 580 MG: 200; 40 SUSPENSION ORAL at 20:38

## 2021-02-08 RX ADMIN — POLYETHYLENE GLYCOL 3350 17 G: 17 POWDER, FOR SOLUTION ORAL at 07:40

## 2021-02-08 RX ADMIN — BUDESONIDE 0.5 MG: 0.5 INHALANT ORAL at 07:35

## 2021-02-08 RX ADMIN — LEVALBUTEROL HYDROCHLORIDE 1.25 MG: 1.25 SOLUTION RESPIRATORY (INHALATION) at 11:54

## 2021-02-08 RX ADMIN — CALCIUM CHLORIDE, MAGNESIUM CHLORIDE, DEXTROSE MONOHYDRATE, LACTIC ACID, SODIUM CHLORIDE, SODIUM BICARBONATE AND POTASSIUM CHLORIDE 12.5 ML/KG/HR: 5.15; 2.03; 22; 5.4; 6.46; 3.09; .157 INJECTION INTRAVENOUS at 04:35

## 2021-02-08 RX ADMIN — Medication 10 NG/KG/MIN: at 07:43

## 2021-02-08 RX ADMIN — VALGANCICLOVIR HYDROCHLORIDE 450 MG: 50 POWDER, FOR SOLUTION ORAL at 20:39

## 2021-02-08 RX ADMIN — POSACONAZOLE 200 MG: 40 SUSPENSION ORAL at 07:38

## 2021-02-08 RX ADMIN — Medication 10 MG: at 20:39

## 2021-02-08 RX ADMIN — INSULIN ASPART 1 UNITS: 100 INJECTION, SOLUTION INTRAVENOUS; SUBCUTANEOUS at 04:21

## 2021-02-08 RX ADMIN — INSULIN ASPART 1 UNITS: 100 INJECTION, SOLUTION INTRAVENOUS; SUBCUTANEOUS at 11:41

## 2021-02-08 RX ADMIN — DEXMEDETOMIDINE 0.4 MCG/KG/HR: 100 INJECTION, SOLUTION, CONCENTRATE INTRAVENOUS at 08:28

## 2021-02-08 RX ADMIN — OLANZAPINE 5 MG: 2.5 TABLET, FILM COATED ORAL at 20:39

## 2021-02-08 ASSESSMENT — ACTIVITIES OF DAILY LIVING (ADL)
ADLS_ACUITY_SCORE: 22
ADLS_ACUITY_SCORE: 18
ADLS_ACUITY_SCORE: 22
ADLS_ACUITY_SCORE: 22
ADLS_ACUITY_SCORE: 18
ADLS_ACUITY_SCORE: 18

## 2021-02-08 ASSESSMENT — MIFFLIN-ST. JEOR: SCORE: 1127

## 2021-02-08 NOTE — PROGRESS NOTES
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 She was transferred from OSH ICU 1/22 and intubated and requiring vasopressors. Found to have PCP pneumonia.     CHANGES and MAJOR THINGS TODAY:   - Wean fentanyl and versed ggts, will add precedex ggt  - Continue treatment with bactrim, prednisone taper  - Continue zosyn empiric treatment, will discuss ongoing plan with pulm  - Flolan stopped this AM. PEEP increased to 10, will follow up ABG, if not improved will consider deepening sedation and proning as indicated. Will obtain CT chest 2/9 to assess for worsening ARDS vs fibrosis  - Plan to run CRRT until filter clots then transition to iHD     PLAN:     Neuro:  # Pain and sedation  - Wean fentanyl and versed ggts, will add precedex ggt  - Fentanyl and versed PRNs ordered    # Delirium   - Will add PRN and scheduled zyprexa       #Insomnia  - Melatonin      #Unequal pupils  Patient with alternating fixed and dilated pupils. CTH unremarkable. Neuro exam otherwise unremarkable  - Optho consulted, no acute concerns     Pulmonary:  #Acute Hypoxic Respiratory Failure,  Suspected 2/2 PJP initially. OSH CT 1/23 + bilateral ground glass opacities and consolidative lung infiltrates centrally distributed to the upper lobes and RLL, . Covid-19 negative X3 at OSH. Given acute and severe decompensation, concern for secondary pneumonia. 2/4 CT chest which displayed worsening consolidations consistent with progression of pneumocystis with the possibility of superimposed  secondary pneumonia.  - PJP PCR positive, returned on 1/28  - Continue micro work up and management as below  - Flolan stopped this AM  - Continue dialysis as below  - Maintain supine position for now  - Care conference 2/5, continue restorative measures, DNR/comfort cares conversation ongoing  - PEEP increased to 10, will follow up ABG, if not improved will consider deepening sedation and proning as indicated  - Plan for CT chest 2/9 to evaluate for evidence of fibrosis   Ventilation Mode: CMV/AC  (Continuous Mandatory Ventilation/ Assist Control)  FiO2 (%): 60 %  Rate Set (breaths/minute): 24 breaths/min  Tidal Volume Set (mL): 320 mL  PEEP (cm H2O): 8 cmH2O  Oxygen Concentration (%): 60 %  Resp: 30     #S/P Bilateral Lung Transplant   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  - Positive PJP PCR on 1/28 with positive fungitel   - Continue PTA tacrolimus  - Prednisone taper, holding home dose for now  - Pulmonary lung transplant team consulted and following, appreciate recs     Cardiovascular:  #Shock  #Hx Hypertension  Patient ith hypotension likely related to septic shock with some contribution of propofol. Last ECHO 10/21/20 with EF 60-65%, normal LV & RV function.   - weaned off levo 2/7  - Hold PTA coreg and amlodipine      # Atrial fibrillation w/ RVR  Patient with pAF with rates into 130-40s. Likely in the setting of acute pulmoary illness. Will monitor with addition of precedex     GI/Nutrition:  # Portal HTN  Liver biopsy positive for congestive hepatopathy. Previous had ascites thought to be r/t elevated right sided heart pressures. Last saw GI on 12/21/20 and patient endorsed that her ascites is no longer present. Etiology unclear.   - NTD     # Nutrition  - NJT placed by rads, continue TF     #  Constipation  Patient reportedly has history of constipation with bactrim use, now stooling  - Scheduled miralax ordered  - PRN dulcolax and senna-doc ordered     Renal/Fluids/Electrolytes:  # ESRD on iHD twice weekly (M/Th).   # Anion gap metabolic acidosis r/t ESRD, uremia- Resolved  # On CRRT  Outpatient dry body weight 56.5 kg.  - Nephrology Consulted   - CRRT initiated 2/4, goal net negative 0-1 L to achieve dry weight   - Plan to run CRRT until filter clots then transition to iHD  - I&O  - Daily Weights   - Trend BMP      # Hypokalemia  - Dialysis as above     Endocrine:  # Seronegative RA with Raynaud's   - NTD, monitor   # Hyperglycemia, steroid induced  - High intensity sliding scale insulin  - 10 units glargine daily     ID:  # Sepsis with Shock  Suspected related to PCP pneumonia. OSH CT 1/23 + bilateral ground glass opacities and consolidative lung infiltrates centrally distributed to the upper lobes and RLL. Covid-19 negative X3 at OSH. Procalcitonin negative. Urine strep, CMV, and RPP negative. Given acute and severe decompensation, concern for secondary pneumonia. 2/4 CT chest which displayed worsening consolidations consistent with progression of pneumocystis with the possibility of superimposed secondary pneumonia.  - PJP PCR positive, fungitell positive as well  - Continue treatment with bactrim, prednisone taper  - Continue zosyn empiric treatment, plan for 10 days (ending 2/12), will discuss plan with pulmonology    - s/p thora 1/25, fluid exudative, negative w/u  - s/p 7 day course of empiric ceftriaxone      # Chronic/Stable right aspergillus empyema   - Continue posaconazole, treatment dosing, will monitor QTc (509 on 2/8)  - Discussed discontinuing posa with ID, recommended not discontinuing during acute illness     Hematology:    # Anemia r/t renal disease, chronic stable   # Thrombocytopenia r/t critical illness  # Leukocytosis, infection/steroids  Takes aranesp 25 mcg every four weeks,  last dose 01/14. Next dose 2/11  - Daily CBC     Musculoskeletal:  #Weakness/Deconditioning of critical illness  - PT/OT consulted     Skin:  # Dermatomyositis antitryptase syndrome   - Noted, NTD     General Cares/Prophylaxis:    DVT Prophylaxis: Heparin SQ  GI Prophylaxis: PPI  Restraints: Not indicated  Family Communication: Plan to update  at patient bedside.   Code Status: Full      Lines/tubes/drains:  - HD fistula left forearm  - CVC RIJ  - PIV  - A-line  - NJT  - Rectal pouch  - ETT    Disposition:  - Medical ICU    Patient seen and findings/plan discussed with medical ICU staff, Dr. Alcantara.    Tomas Rolle MD  MICU Service  Internal Medicine PGY-1  P. [id number]    ====================================  INTERVAL HISTORY:   No acute events. Patient continues to have episodes of anxiety and restlessness, She denies acute symptoms this AM.    OBJECTIVE:   1. VITAL SIGNS:   Temp:  [96.7  F (35.9  C)-97.7  F (36.5  C)] 97  F (36.1  C)  Pulse:  [] 113  Resp:  [25-30] 30  BP: (167-177)/(84-90) 177/90  MAP:  [60 mmHg-110 mmHg] 86 mmHg  Arterial Line BP: ()/(42-81) 120/63  FiO2 (%):  [60 %] 60 %  SpO2:  [89 %-100 %] 97 %  Ventilation Mode: CMV/AC  (Continuous Mandatory Ventilation/ Assist Control)  FiO2 (%): 60 %  Rate Set (breaths/minute): 24 breaths/min  Tidal Volume Set (mL): 320 mL  PEEP (cm H2O): 8 cmH2O  Oxygen Concentration (%): 60 %  Resp: 30    2. INTAKE/ OUTPUT:   I/O last 3 completed shifts:  In: 2554.21 [I.V.:817.71; NG/GT:656.5]  Out: 3923 [Other:3923]    3. PHYSICAL EXAMINATION:  General: Laying in bed. No acute distress. Intubated, sedated, supine position.  Neuro: Sedated, eyes open to voice, following simple commands, communicating appropriately, no notable clonus  Pulm/Resp: Diffuse coarse breath sounds, end expiratory wheezing, unchanged  CV: Pulses intact, irregularly irregular   Abdomen: soft, non-distended   Incisions/Skin: Left AV fistula with thrill and bruit    4. LABS:    Arterial Blood Gases   Recent Labs   Lab 02/08/21  0450 02/07/21  0400 02/06/21  0800 02/06/21  0345   PH 7.41 7.39 7.38 7.38   PCO2 43 43 42 42   PO2 83 67* 93 73*   HCO3 28 26 25 25     Complete Blood Count   Recent Labs   Lab 02/08/21  0450 02/07/21  0400 02/06/21  0345 02/05/21  0321   WBC 9.2 13.1* 14.2* 10.8   HGB 7.5* 7.8* 8.1* 7.7*   * 143* 154 138*     Basic Metabolic Panel  Recent Labs   Lab 02/08/21  0450 02/07/21  2300 02/07/21  1600 02/07/21  1030    139 138 138   POTASSIUM 4.1 4.5 5.2 4.2   CHLORIDE 105 107 107 106   CO2 27 26 26 26   BUN 21 24 24 23   CR 1.06* 1.09* 1.10* 1.11*   * 144* 160* 172*     Liver Function Tests  Recent Labs   Lab 02/08/21  0450 02/07/21  0400 02/06/21  0345 02/05/21  0321   AST 4 3 3 6   ALT 29 31 32 28   ALKPHOS 178* 166* 142 146   BILITOTAL 0.4 0.4 0.4 0.4   ALBUMIN 2.0* 2.0* 1.9* 1.9*     Coagulation Profile  No lab results found in last 7 days.    5. RADIOLOGY:   No results found for this or any previous visit (from the past 24 hour(s)).

## 2021-02-08 NOTE — PROGRESS NOTES
"CRRT STATUS NOTE    DATA:  Time:  6:12 AM  Pressures WNL:  YES  Filter Status:  WDL    Problems Reported/Alarms Noted:  Filter pressures high    Supplies Present:  YES    ASSESSMENT:  Patient Net Fluid Balance:  -[phone] ([phone])  Vital Signs:  BP (!) 177/90   Pulse 134   Temp 97  F (36.1  C) (Axillary)   Resp 27   Ht 1.6 m (5' 2.99\")   Wt 57.8 kg (127 lb 6.8 oz)   LMP 06/07/2014 (Exact Date)   SpO2 95%   BMI 22.58 kg/m      Labs:    Recent Labs   Lab Test 02/07/21  2300 02/07/21  1600    138   POTASSIUM 4.5 5.2   CHLORIDE 107 107   CO2 26 26   ANIONGAP 6 5   * 160*   BUN 24 24   CR 1.09* 1.10*   CHELSEY 8.5 8.2*   mag              Goals of Therapy:  UF 0-50 ml/hr , provided MAP > 60  AND NOT REQUIRING PRESSORS    INTERVENTIONS:   New circuit.    PLAN:  Continue to pull fluid per renals orders. Notify CRRT resource RN with any question/ concerns ([id number]). Change circuit q72hr and PRN    "

## 2021-02-08 NOTE — PROGRESS NOTES
CRRT STATUS NOTE    DATA:  Time:  6:39 PM  Pressures WNL:  YES  Filter Status:  WDL and Clotting (will need to be changed this evening)  Problems Reported/Alarms Noted:  none  Supplies Present:  YES    ASSESSMENT:  Patient Net Fluid Balance:  -474 at 1830  Vital Signs: T max temp 97.7, afib 90-110s, MAP >60-65 (no pressors), vent FiO2 60%   Labs:  K 5.2 at 1600, next re check at 2200, Cr 1.10, Mg 2.6, Ca ion 4.7, WBC 13.1, hbg 7.8  Goals of Therapy:  UF 0-50 ml/hr , provided MAP > 60  AND NOT REQUIRING PRESSORS    INTERVENTIONS: No acute interventions needed by CRRT resource     PLAN: Continue to pull fluid per renal orders. [place]ify CRRT resource RN on [phone] with any question/ concerns. Change circuit q72hr and PRN

## 2021-02-08 NOTE — PROGRESS NOTES
Care Management Follow Up    Length of Stay (days): 15    Supportive visit with patient and spouse at bedside.  Patient awake and alert, but sleepy.  Recognized writer.  Communicated with hand singles (marching fists, for working hard.)  Support and encouragement provided.  Spouse feeling more hopeful.  Wants to find apartment near hospital as he is feeling like recovery maybe possible, but that it will be a long haul.  Provided him with options.      Additional Information:  will continue to follow for support, counseling, education and resources.        DANA RoseSW

## 2021-02-08 NOTE — PLAN OF CARE
ICU End of Shift Summary. See flowsheets for vital signs and detailed assessment.    Changes this shift: Patient remained off pressors overnight, MAP goal>60 achieved with no issues. Intubated and lightly sedated. Patient frustrated with communication barriers, picture boards, letters, and markers offered. Patient at times seems confused when attempting to use communication devices, only scribbles when writing and does not always coherently point to letters. Anxious at times, asks repeatedly for  to be at bedside. CRRT bags changed to 2K for rising potassium. Blood flow rate decreased to 180. Filter changed approx 2100, lines remain reversed. Patient tolerated fluid pull very well. Net -476 since midnight.    Plan: Pull fluid on CRRT as tolerated by patient per order. Wean vent settings and sedation as tolerated. Continue bowel regimen, no BM since 2/5. Continue to work on communication. Continue discussions on goals of care with patient and family.       Problem: Hypertension Comorbidity  Goal: Blood Pressure in Desired Range  Outcome: Improving     Problem: Adult Inpatient Plan of Care  Goal: Optimal Comfort and Wellbeing  Outcome: No Change     Problem: Adult Inpatient Plan of Care  Goal: Readiness for Transition of Care  Outcome: No Change     Problem: ARDS (Acute Respiratory Distress Syndrome)  Goal: Effective Oxygenation  Outcome: No Change

## 2021-02-08 NOTE — PROGRESS NOTES
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  Patient was admitted to OSH on  for acute hypoxemic respiratory failure and new lung opacities. Intubated  and transferred to [facility] for ongoing management.  Extubated  but reintubated - for progressive hypoxemia.  Rapid decompensation 2/3 with ARDS presentation requiring reintubation, prone positioning, and inhaled epoprostenol.  Significant rise in leukocytosis (2/3) concerning for worsening PJP versus secondary infection.      Today's recommendations:  - Tacro will remain at .5, no level was done 2021, will order a level for 2021  - EKG noted to have QTc 509, recommend to adjust the Olanzapine dosing and consult transplant ID regarding Posoconazole dosing and to determine if that is still needed given absence of Aspergillus on subsequent cultures x2   - Recommend to continue the bactrim for the PJP pneumonia, will need 21 day course, this will cover the Eikenella and Stenothrophomonas he had on BAL from 2021  - Agree with continuing the Prednisone 20 mg once daily for 11 more days  - Continue to follow pending cultures       Acute hypoxemic respiratory failure:   Right post-obstructive Stenotrophomonas and Eikenella pneumonia:  PJP pneumonia:  Septic shock:   ARDS: Presented to OSH ED with increased SOB and hypoxia, initially requiring 4L NC but continued to decline and subsequently intubated .  Hemodynamic  instability after intubation requiring pressors, transferred to [facility].  Afebrile but with leukocytosis.  COVID and respiratory panel negative.  PTA bronch (12/23/20) with moderate airway obstruction and RML/RLL mucous plugging, cultures with Stenotrophomonas and Eikenella (IV ceftazidime 1/13-1/19).  OSH CT chest with new multifocal GGO bilaterally and increased left loculated pleural effusion.  Repeat bronch (1/24) with RUL BAL with thick copious secretions to RML/RLL, PJP PCR positive.  Extubated on 1/26, but reintubated from 1/27-1/29 for progressive hypoxemia.  Remained on either HFNC % FiO2 or BiPAP % FiO2, then eventually decompensated on 2/3 and again reintubated.  Working revealing for ARDS, proning protocol initiated (2/3) with inhaled epoprostenol, pressors initiated.  Significant rise in leukocytosis (2/3) concerning for worsening PJP vs secondary infection.  CT chest (2/3) with progressive consolidation (consistent with ARDS) and possible superimposed infection findings, stable LLL chronic empyema.  CRRT initiated 2/4 as below.  - Sputum culture/gram stain (2/3) NGTD  - Blood cultures (2/3) NGTD   - BDG fungitell (2/4) + 254 on 2/4  - ABX per MICU: Bactrim (1/24) for PJP and Steno (noted 12/23) coverage, empiric Zosyn (2/3) end date tentatively 2/12  - Stress dose steroids and taper per MICU (prednisone 40 mg daily, decreased 2/2)  - Pulmonary toilet with chest physiotherapy QID and nebs: Xopenex QID, Mucomyst QID, and Pulmicort BID  - Ventilator and ARDS management per MICU       Aspergillus empyema (left-sided): First noted 10/8/19, negative in November and again on admission.  CT scan on 7/17/20 with increased mass-like density, likely pleural-based, in LLL area s/p needle aspiration (8/13/20) with Aspergillus fumigatus on cultures s/p intrapleural amphotericin bead placement (11/20).  Following with ID as OP.  LFTs stable on admission (ALP chronically mildly elevated).  CT chest  with increased loculated left pleural effusion s/p thoracentesis (1/25), exudative with 87% neutrophils, very high LDL (6308), cytology normal.   - AFB pleural cultures (1/25) NGTD  - PTA posaconazole, PTA plan for indefinite course; level 0.8 on 2/4 (prior level 1/26 therapeutic), recommend transplant ID consult to determine if that is still needed given absence of Aspergillus on subsequent cultures x2        S/p bilateral lung transplant for ILD 2/2 CTD:   Right mainstem bronchial stenosis (s/p dilation 3/2019): Last seen in pulmonary clinic 12/2. DSA (1/25) not detected. Continued right mainstem stenosis noted with PTA bronch on 12/23/20, mild on 1/24 bronch.  - Follow up with IP as OP for monitoring of right bronchial stenosis     Immunosuppression: On 2 drug IST d/t recurrent infections  - Tacrolimus 1/1.5mg pm  Goal level 8-10, trended level 7.7 on 2/7, steady state level ordered for 2/9  - Chronic prednisone dose on hold with above stress dose steroids (5 mg qAM / 2.5 mg qPM)     Prophylaxis:   - Bactrim treatment dose as above, PTA had not been on PJP ppx since 7/28/20 as CD4 > 200    ICU Delirium: Has a hx of delirium while hospitalized. Manifests with some paranoia.   - Zyprexa per primary team  - Recommend to adjust the Zyprex dose given the QTc prolongation   - Frequent reorientation  - Discussed with  that while distressing our goal is not to sedate her further as some of the meds can make her delirium worse, but to try to verbally deescalate/reorient.      H/o CMV viremia: CMV D+/R+.  CMV <137 (1/25).  - VGCV for CMV ppx with stress dose steroids (1/26)      EBV viremia: Level 12/9/20 mildly elevated to 10K (log 4) from prior 3K (3.5 log) on 9/9/20, repeat (1/25/21) decreased to 5619 copies (log of 3.8).  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 Chronic iHD M/Th via AVF with partial graft.  Not able to tolerate iHD 2/3 d/t  "hypotension, line placed and transitioned to CRRT 2/4.  - Monitoring of tacrolimus as above  - Dialysis management per nephrology     Atrial fibrillation w/ RVR: H/o paroxysmal AF, last 10/2019.  Recurrence 1/30 with RVR (-140s), likely in the setting of acute pulmonary illness.  - Management per MICU    Oropharyngeal candidiasis: White tongue plaque noted 2/1.  - Nystatin QID (2/1)      We appreciate the excellent care provided by the MICU team. Recommendations communicated via in person rounding and this note. Will continue to follow along closely, please do not hesitate to call with any questions or concerns.     Discussed and seen with Dr. Henry Braun MD   Pulmonary and Critical Care Fellow   Pager [phone]      Subjective & Interval History:   Patient is more interactive today, she is only on Precedex gtt 0.6, she is alert and following commands, she is a bit anxious, she would like to be extubated.  She denied any pain, she is comfortable on the ventilator otherwise.  She is hemodynamically stable and not on vasopressors support      Review of Systems:     ROS as above, otherwise severely limited d/t intubation and sedation    Physical Exam:     Vital signs:  Temp: 97  F (36.1  C) Temp src: Axillary BP: (!) 177/90 Pulse: 113   Resp: 30 SpO2: 97 % O2 Device: Mechanical Ventilator Oxygen Delivery: 60 LPM Height: 160 cm (5' 2.99\") Weight: 57.8 kg (127 lb 6.8 oz)  I/O:         Intake/Output Summary (Last 24 hours) at 2/8/2021 1304  Last data filed at 2/8/2021 1200  Gross per 24 hour   Intake 2440.8 ml   Output 3722 ml   Net -1281.2 ml           Constitutional: Supine  HEENT: Orally intubated.   PULM: Slightly diminished at the bases, otherwise clear to auscultation bilaterally with no crackles or wheezing   CV: Trace pitting edema.   ABD: Soft, nondistended, nontender, active bowel sounds  MSK: Moving extremities.   NEURO: quick response to verbal command. Follows all commands and nod yes/ " no for binary questions  SKIN: Warm, dry. No rash on limited exam.   PSYCH: Calm.       Data:     LABS    CMP:   Recent Labs   Lab 02/08/21  0450 02/07/21  2300 02/07/21  1600 02/07/21  1030 02/07/21  0400 02/06/21 1600 02/06/21 1600 02/06/21  0345 02/06/21  0345 02/05/21  0321 02/05/21  0321    139 138 138 137   < > 137   < > 137   < > 135   POTASSIUM 4.1 4.5 5.2 4.2 4.5   < > 4.9   < > 3.9   < > 3.7   CHLORIDE 105 107 107 106 106   < > 106   < > 106   < > 103   CO2 27 26 26 26 26   < > 26   < > 24   < > 25   ANIONGAP 6 6 5 6 6   < > 5   < > 7   < > 6   * 144* 160* 172* 118*   < > 175*   < > 119*   < > 162*   BUN 21 24 24 23 23   < > 27   < > 30   < > 46*   CR 1.06* 1.09* 1.10* 1.11* 1.17*   < > 1.26*   < > 1.59*   < > 2.42*   GFRESTIMATED 58* 56* 55* 55* 51*   < > 47*   < > 35*   < > 21*   GFRESTBLACK 67 65 64 63 59*   < > 54*   < > 41*   < > 25*   CHELSEY 8.8 8.5 8.2* 8.5 8.4*   < > 8.3*   < > 7.7*   < > 8.0*   MAG 2.4*  --  2.6*  --  2.6*  --  2.5*  --  2.3   < > 2.2   PHOS 2.8  --  4.0  --  4.0  --  4.0  --  3.9   < > 4.0   PROTTOTAL 6.1*  --   --   --  6.0*  --   --   --  6.0*  --  5.6*   ALBUMIN 2.0*  --   --   --  2.0*  --   --   --  1.9*  --  1.9*   BILITOTAL 0.4  --   --   --  0.4  --   --   --  0.4  --  0.4   ALKPHOS 178*  --   --   --  166*  --   --   --  142  --  146   AST 4  --   --   --  3  --   --   --  3  --  6   ALT 29  --   --   --  31  --   --   --  32  --  28    < > = values in this interval not displayed.     CBC:   Recent Labs   Lab 02/08/21  0450 02/07/21  0400 02/06/21  0345 02/05/21  0321   WBC 9.2 13.1* 14.2* 10.8   RBC 2.50* 2.61* 2.71* 2.61*   HGB 7.5* 7.8* 8.1* 7.7*   HCT 23.6* 23.9* 24.9* 23.5*   MCV 94 92 92 90   MCH 30.0 29.9 29.9 29.5   MCHC 31.8 32.6 32.5 32.8   RDW 17.2* 16.9* 16.2* 15.8*   * 143* 154 138*       INR: No lab results found in last 7 days.    Glucose:   Recent Labs   Lab 02/08/21 0450 02/08/21 0417 02/07/21 2301 02/07/21 2300 02/07/21 2016  02/07/21  1600 02/07/21  1550 02/07/21  1220 02/07/21  1030 02/07/21  0813 02/07/21  0400 02/06/21  2209 02/06/21  2209   *  --   --  144*  --  160*  --   --  172*  --  118*  --  119*   BGM  --  144* 131*  --  110*  --  153* 138*  --  79  --    < >  --     < > = values in this interval not displayed.       Blood Gas:   Recent Labs   Lab 02/08/21  0450 02/07/21  0400 02/06/21  0800   O2PER 60.0 55.0 65.0       Culture Data   Recent Labs   Lab 02/03/21  0941 02/03/21  0900 02/03/21  0816   CULT No growth No growth after 5 days No growth after 5 days       Virology Data:   Lab Results   Component Value Date    FLUAH1 Not Detected 01/24/2021    FLUAH3 Not Detected 01/24/2021    KZ1155 Not Detected 01/24/2021    IFLUB Not Detected 01/24/2021    RSVA Not Detected 01/24/2021    RSVB Not Detected 01/24/2021    PIV1 Not Detected 01/24/2021    PIV2 Not Detected 01/24/2021    PIV3 Not Detected 01/24/2021    HMPV Not Detected 01/24/2021    HRVS Negative 01/24/2021    ADVBE Negative 01/24/2021    ADVC Negative 01/24/2021    ADVC Negative 12/23/2020    ADVC Negative 10/07/2019       Historical CMV results (last 3 of prior testing):  Lab Results   Component Value Date    CMVQNT <137 (A) 01/25/2021    CMVQNT 238 (A) 01/24/2021    CMVQNT 675 (A) 12/23/2020     Lab Results   Component Value Date    CMVLOG <2.1 01/25/2021    CMVLOG 2.4 (H) 01/24/2021    CMVLOG 2.8 (H) 12/23/2020       Urine Studies    Recent Labs   Lab Test 01/24/21  1729 10/21/19  2240   URINEPH 5.0 5.0   NITRITE Negative Negative   LEUKEST Moderate* Large*   WBCU 34* 115*       Most Recent [place]ze Pulmonary Function Testing (FVC/FEV1 only)  FVC-Pre   Date Value Ref Range Status   12/09/2020 1.12 L    09/09/2020 1.56 L    03/09/2020 1.57 L    02/19/2020 1.78 L      FVC-%Pred-Pre   Date Value Ref Range Status   12/09/2020 34 %    09/09/2020 47 %    03/09/2020 48 %    02/19/2020 54 %      FEV1-Pre   Date Value Ref Range Status   12/09/2020 0.98 L    09/09/2020  1.10 L    03/09/2020 0.96 L    02/19/2020 0.99 L      FEV1-%Pred-Pre   Date Value Ref Range Status   12/09/2020 37 %    09/09/2020 42 %    03/09/2020 37 %    02/19/2020 38 %        IMAGING    Recent Results (from the past 48 hour(s))   CT Chest w/o Contrast    Narrative    EXAMINATION: Chest CT  2/4/2021 3:49 AM    CLINICAL HISTORY: Pneumonia, effusion or abscess suspected.    COMPARISON: CT chest 1/27/2021. CT chest 12/9/2020.    TECHNIQUE: CT imaging obtained through the chest without contrast.  Axial, coronal, and sagittal reconstructions and axial MIP reformatted  images are reviewed.     CONTRAST: None    FINDINGS:  Lines and tubes: Endotracheal tube tip terminates in the low thoracic  trachea. Right internal jugular Central venous catheter tip terminates  in the distal SVC. Enteric tube courses into the stomach.    Lungs: Postsurgical changes of bilateral lung transplant. Fluid-filled  upper trachea. Central tracheobronchial tree is otherwise patent.  Trace bilateral pleural effusions. Diffuse groundglass opacification  with interlobular septal thickening throughout the bilateral lung  fields with large confluent areas of consolidation, most pronounced in  the bilateral lower lobes. Overall the degree of consolidation has  increased from prior exam on 1/27/2021. No significant change in the  peripherally calcified chronic empyema in the left lung base.  Intrapleural amphotericin bead noted in the left lung base.    Mediastinum: The visualized thyroid is unremarkable. Cardiac size is  enlarged. No significant pericardial effusion. Normal caliber of the  aorta and main pulmonary artery. Normal branching pattern of the  aortic arch. No significant coronary artery calcium. Mild  atherosclerotic changes of the thoracic aorta. Scattered prominent  mediastinal lymph nodes, not enlarged by size criteria. Esophagus is  normal in caliber.    Bones and soft tissues: Degenerative changes of the spine. Stable  anterior  wedge compression deformity of the T5 vertebral body. Old  right sixth rib fracture. Intact median sternotomy wires. No  suspicious osseous lesions. Mild diffuse anasarca.    Upper Abdomen: Limited evaluation of the upper abdomen is within  normal limits.      Impression    IMPRESSION:   1.  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 Overall the degree  of consolidation has progressed compared to the prior CT on 1/27/2021.  Cannot exclude superimposed infection.  2. No significant change in the chronic empyema in the left lower lobe  status post intrapleural amphotericin B placement.    I have personally reviewed the examination and initial interpretation  and I agree with the findings.    MEHNAZ CHAPMAN MD

## 2021-02-08 NOTE — PROGRESS NOTES
CRRT STATUS NOTE    DATA:  Time:  5:44 PM  Pressures WNL:  Yes  Filter Status:  WDL    Problems Reported/Alarms Noted:  None    Supplies Present:  Yes    ASSESSMENT:  Patient Net Fluid Balance: -668cc since midnight     Vital Signs:  Temp: 96.7  F (35.9  C) Temp src: Axillary  Pulse: 102   Resp: 27 SpO2: 91 % O2 Device: Mechanical Ventilator      Labs:  Last Comprehensive Metabolic Panel:  Sodium   Date Value Ref Range Status   02/08/2021 136 133 - 144 mmol/L Final     Potassium   Date Value Ref Range Status   02/08/2021 4.5 3.4 - 5.3 mmol/L Final     Chloride   Date Value Ref Range Status   02/08/2021 104 94 - 109 mmol/L Final     Carbon Dioxide   Date Value Ref Range Status   02/08/2021 29 20 - 32 mmol/L Final     Anion Gap   Date Value Ref Range Status   02/08/2021 3 3 - 14 mmol/L Final     Glucose   Date Value Ref Range Status   02/08/2021 141 (H) 70 - 99 mg/dL Final     Urea Nitrogen   Date Value Ref Range Status   02/08/2021 21 7 - 30 mg/dL Final     Creatinine   Date Value Ref Range Status   02/08/2021 0.96 0.52 - 1.04 mg/dL Final     GFR Estimate   Date Value Ref Range Status   02/08/2021 65 >60 mL/min/[1.73_m2] Final     Comment:     Non  GFR Calc  Starting 12/18/2018, serum creatinine based estimated GFR (eGFR) will be   calculated using the Chronic Kidney Disease Epidemiology Collaboration   (CKD-EPI) equation.       Calcium   Date Value Ref Range Status   02/08/2021 8.9 8.5 - 10.1 mg/dL Final      Lab Results   Component Value Date    WBC 9.2 02/08/2021     Lab Results   Component Value Date    RBC 2.50 02/08/2021     Lab Results   Component Value Date    HGB 7.5 02/08/2021     Lab Results   Component Value Date    HCT 23.6 02/08/2021     No components found for: MCT  Lab Results   Component Value Date    MCV 94 02/08/2021     Lab Results   Component Value Date    MCH 30.0 02/08/2021     Lab Results   Component Value Date    MCHC 31.8 02/08/2021     Lab Results   Component Value Date     RDW 17.2 02/08/2021     Lab Results   Component Value Date     02/08/2021        Goals of Therapy: 0-50cc/hr net negative, can stop this afternoon/evening with plan to try iHD tomorrow.    INTERVENTIONS:   None.    PLAN:  Continue with plan of care. Contact CRRT resource RN with any questions or concerns.

## 2021-02-08 NOTE — PROGRESS NOTES
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 Nephrology consulted for dialysis needs.         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 Nephrology consutled for dialysis needs.     Melissa Estevez on 2/8/2021 at 12:45 PM   Seen and discussed with Dr Swanson     Recommendations were communicated to primary team via verbal communication.         ADRIÁN Lo CNS  Clinical Nurse Specialist  [phone]          Medical Student Interval History MARIA A Interval History   Medical student: Interval History  Mrs. Blue still on CRRT, will plan to stop this afternoon unless filter clots. Will then transition to iHD. Labs stable this AM. Net negative .5L so far today. Has been off pressors and Bps are stable. Will continue to assess as patient is transitioned to iHD.     Review of Systems  ROS not done due to vent/sedation. Mrs Blue improved over the weekend, now off of pressors and improved from pulm standpoint but still on vent.  CRRT continues, have been able to pull fluid and are now close to EDW.  Continuing CRRT until this afternoon then will stop and give her overnight off.   "Will plan to try iHD tomorrow and pull temp HD line to avoid any infection issues.      Review of Systems:   ROS not done due to vent/sedation.       Physical Exam (Student)  \"Vitals were reviewed\"  BP (!) 177/90   Pulse 98   Temp 97  F (36.1  C) (Axillary)   Resp 26   Ht 1.6 m (5' 2.99\")   Wt 57.8 kg (127 lb 6.8 oz)   LMP 06/07/2014 (Exact Date)   SpO2 95%   BMI 22.58 kg/m        Intake/Output Summary (Last 24 hours) at 2/8/2021 1245  Last data filed at 2/8/2021 1200  Gross per 24 hour   Intake 2502.3 ml   Output 3925 ml   Net -1422.7 ml        GENERAL APPEARANCE: intubated  EYES: no scleral icterus  Endo: no moon facies  Pulmonary: Intubated, equal expansion.    CV: regular rhythm, normal rate - Edema present  GI: soft, non distended  MS: no evidence of inflammation in joints, no muscle tenderness  :  Basilio present  SKIN: warm, dry, +1 edema.    NEURO: drowsy .    Physical Exam (Physician)  Vitals were reviewed  BP (!) 177/90   Pulse 98   Temp 97  F (36.1  C) (Axillary)   Resp 26   Ht 1.6 m (5' 2.99\")   Wt 57.8 kg (127 lb 6.8 oz)   LMP 06/07/2014 (Exact Date)   SpO2 95%   BMI 22.58 kg/m       Intake/Output Summary (Last 24 hours) at 2/8/2021 1245  Last data filed at 2/8/2021 1200  Gross per 24 hour   Intake 2502.3 ml   Output 3925 ml   Net -1422.7 ml        GENERAL APPEARANCE: intubated  EYES: no scleral icterus  Endo: no moon facies  Pulmonary: Intubated, equal expansion.    CV: regular rhythm, normal rate - Edema present  GI: soft, non distended  MS: no evidence of inflammation in joints, no muscle tenderness  :  Basilio present  SKIN: warm, dry, +1 edema.    NEURO: drowsy .     Labs:   The following labs were reviewed:   CMP  Recent Labs   Lab 02/08/21  0956 02/08/21  0450 02/07/21  2300 02/07/21  1600 02/07/21  0400 02/07/21  0400 02/06/21  1600 02/06/21  1600 02/06/21  0345 02/06/21  0345 02/05/21  0321 02/05/21  0321    137 139 138   < > 137   < > 137   < > 137   < > 135   POTASSIUM " 3.9 4.1 4.5 5.2   < > 4.5   < > 4.9   < > 3.9   < > 3.7   CHLORIDE 106 105 107 107   < > 106   < > 106   < > 106   < > 103   CO2 27 27 26 26   < > 26   < > 26   < > 24   < > 25   ANIONGAP 6 6 6 5   < > 6   < > 5   < > 7   < > 6   * 146* 144* 160*   < > 118*   < > 175*   < > 119*   < > 162*   BUN 21 21 24 24   < > 23   < > 27   < > 30   < > 46*   CR 1.01 1.06* 1.09* 1.10*   < > 1.17*   < > 1.26*   < > 1.59*   < > 2.42*   GFRESTIMATED 61 58* 56* 55*   < > 51*   < > 47*   < > 35*   < > 21*   GFRESTBLACK 71 67 65 64   < > 59*   < > 54*   < > 41*   < > 25*   CHELSEY 9.0 8.8 8.5 8.2*   < > 8.4*   < > 8.3*   < > 7.7*   < > 8.0*   MAG  --  2.4*  --  2.6*  --  2.6*  --  2.5*  --  2.3   < > 2.2   PHOS  --  2.8  --  4.0  --  4.0  --  4.0  --  3.9   < > 4.0   PROTTOTAL  --  6.1*  --   --   --  6.0*  --   --   --  6.0*  --  5.6*   ALBUMIN  --  2.0*  --   --   --  2.0*  --   --   --  1.9*  --  1.9*   BILITOTAL  --  0.4  --   --   --  0.4  --   --   --  0.4  --  0.4   ALKPHOS  --  178*  --   --   --  166*  --   --   --  142  --  146   AST  --  4  --   --   --  3  --   --   --  3  --  6   ALT  --  29  --   --   --  31  --   --   --  32  --  28    < > = values in this interval not displayed.     CBC  Recent Labs   Lab 02/08/21  0450 02/07/21  0400 02/06/21  0345 02/05/21  0321   HGB 7.5* 7.8* 8.1* 7.7*   WBC 9.2 13.1* 14.2* 10.8   RBC 2.50* 2.61* 2.71* 2.61*   HCT 23.6* 23.9* 24.9* 23.5*   MCV 94 92 92 90   MCH 30.0 29.9 29.9 29.5   MCHC 31.8 32.6 32.5 32.8   RDW 17.2* 16.9* 16.2* 15.8*   * 143* 154 138*     INRNo lab results found in last 7 days.  ABG  Recent Labs   Lab 02/08/21  1200 02/08/21  0956 02/08/21  0450 02/07/21  0400   PH 7.40 7.39 7.41 7.39   PCO2 47* 45 43 43   PO2 72* 60* 83 67*   HCO3 29* 28 28 26   O2PER 60 60 60.0 55.0      URINE STUDIES  Recent Labs   Lab Test 01/24/21  1729 10/21/19  2240 09/12/19  0125 08/07/19  1512   COLOR Yellow Yellow Light Yellow Yellow   APPEARANCE Slightly Cloudy Cloudy Clear  Clear   URINEGLC Negative Negative Negative Negative   URINEBILI Negative Negative Negative Negative   URINEKETONE 5* Negative 10* Negative   SG 1.023 1.018 1.008 1.016   UBLD Small* Trace* Negative Negative   URINEPH 5.0 5.0 7.5* 7.0   PROTEIN 30* 30* 30* 100*   NITRITE Negative Negative Negative Negative   LEUKEST Moderate* Large* Negative Trace*   RBCU 26* 25* <1 10*   WBCU 34* 115* 3 19*     Recent Labs   Lab Test 08/07/19  1512   UTPG 2.47*     PTH  No lab results found.  IRON STUDIES  Recent Labs   Lab Test 02/03/21  0415 12/13/18  1033 08/01/18  0921 05/08/18  0709   IRON 51 16* 93 48   * 221* 248 275   IRONSAT 36 7* 37 18   YOLA  --  302* 571*  --      Imaging:      Current Medications:    B and C vitamin Complex with folic acid  5 mL Oral or Feeding Tube Daily     budesonide  0.5 mg Nebulization BID     heparin ANTICOAGULANT  5,000 Units Subcutaneous Q8H     insulin aspart  1-12 Units Subcutaneous Q4H     insulin glargine  10 Units Subcutaneous QAM AC     levalbuterol  1.25 mg Nebulization 4x Daily     nystatin  1,000,000 Units Mouth/Throat 4x Daily     OLANZapine  5 mg Oral or Feeding Tube Q24H     pantoprazole  40 mg Oral QAM AC     piperacillin-tazobactam  4.5 g Intravenous Q6H     polyethylene glycol  17 g Oral or Feeding Tube BID     posaconazole  200 mg Oral TID     [Held by provider] predniSONE  2.5 mg Oral or Feeding Tube QPM     predniSONE  20 mg Oral Daily     [Held by provider] predniSONE  5 mg Oral or Feeding Tube QAM     sulfamethoxazole-trimethoprim  580 mg Oral or Feeding Tube BID     tacrolimus  1 mg Oral or Feeding Tube Q24H     tacrolimus  1.5 mg Oral or Feeding Tube Q24H     valGANciclovir  450 mg Oral Once per day on Mon Thu       dexmedetomidine 0.6 mcg/kg/hr (02/08/21 1200)     dextrose       CRRT replacement solution 12.5 mL/kg/hr (02/08/21 1139)     fentaNYL Stopped (02/08/21 0740)     midazolam Stopped (02/08/21 0928)     - MEDICATION INSTRUCTIONS -       CRRT replacement  solution 200 mL/hr at 02/07/21 2038     CRRT replacement solution 12.5 mL/kg/hr (02/08/21 1139)     sodium chloride 10 mL/hr at 02/08/21 0400     Melissa Estevez

## 2021-02-09 ENCOUNTER — APPOINTMENT (OUTPATIENT)
Dept: CT IMAGING | Facility: CLINIC | Age: 61
End: 2021-02-09
Attending: INTERNAL MEDICINE
Payer: COMMERCIAL

## 2021-02-09 ENCOUNTER — APPOINTMENT (OUTPATIENT)
Dept: GENERAL RADIOLOGY | Facility: CLINIC | Age: 61
End: 2021-02-09
Attending: NURSE PRACTITIONER
Payer: COMMERCIAL

## 2021-02-09 LAB
ALBUMIN SERPL-MCNC: 1.8 G/DL (ref 3.4–5)
ALBUMIN SERPL-MCNC: 2 G/DL (ref 3.4–5)
ALP SERPL-CCNC: 182 U/L (ref 40–150)
ALP SERPL-CCNC: 218 U/L (ref 40–150)
ALT SERPL W P-5'-P-CCNC: 28 U/L (ref 0–50)
ALT SERPL W P-5'-P-CCNC: 32 U/L (ref 0–50)
ANION GAP SERPL CALCULATED.3IONS-SCNC: 5 MMOL/L (ref 3–14)
ANION GAP SERPL CALCULATED.3IONS-SCNC: 5 MMOL/L (ref 3–14)
AST SERPL W P-5'-P-CCNC: 6 U/L (ref 0–45)
AST SERPL W P-5'-P-CCNC: <3 U/L (ref 0–45)
BACTERIA SPEC CULT: NO GROWTH
BACTERIA SPEC CULT: NO GROWTH
BASE EXCESS BLDA CALC-SCNC: 2.9 MMOL/L
BASE EXCESS BLDA CALC-SCNC: 3.4 MMOL/L
BILIRUB SERPL-MCNC: 0.3 MG/DL (ref 0.2–1.3)
BILIRUB SERPL-MCNC: 0.4 MG/DL (ref 0.2–1.3)
BLD PROD TYP BPU: NORMAL
BLD UNIT ID BPU: 0
BLOOD PRODUCT CODE: NORMAL
BPU ID: NORMAL
BUN SERPL-MCNC: 20 MG/DL (ref 7–30)
BUN SERPL-MCNC: 28 MG/DL (ref 7–30)
CA-I BLD-MCNC: 5.1 MG/DL (ref 4.4–5.2)
CALCIUM SERPL-MCNC: 8.3 MG/DL (ref 8.5–10.1)
CALCIUM SERPL-MCNC: 8.8 MG/DL (ref 8.5–10.1)
CHLORIDE SERPL-SCNC: 102 MMOL/L (ref 94–109)
CHLORIDE SERPL-SCNC: 105 MMOL/L (ref 94–109)
CO2 SERPL-SCNC: 27 MMOL/L (ref 20–32)
CO2 SERPL-SCNC: 28 MMOL/L (ref 20–32)
CREAT SERPL-MCNC: 1.08 MG/DL (ref 0.52–1.04)
CREAT SERPL-MCNC: 1.38 MG/DL (ref 0.52–1.04)
ERYTHROCYTE [DISTWIDTH] IN BLOOD BY AUTOMATED COUNT: 17.4 % (ref 10–15)
ERYTHROCYTE [DISTWIDTH] IN BLOOD BY AUTOMATED COUNT: 17.6 % (ref 10–15)
GFR SERPL CREATININE-BSD FRML MDRD: 42 ML/MIN/{1.73_M2}
GFR SERPL CREATININE-BSD FRML MDRD: 56 ML/MIN/{1.73_M2}
GLUCOSE BLDC GLUCOMTR-MCNC: 122 MG/DL (ref 70–99)
GLUCOSE BLDC GLUCOMTR-MCNC: 136 MG/DL (ref 70–99)
GLUCOSE BLDC GLUCOMTR-MCNC: 145 MG/DL (ref 70–99)
GLUCOSE BLDC GLUCOMTR-MCNC: 149 MG/DL (ref 70–99)
GLUCOSE BLDC GLUCOMTR-MCNC: 164 MG/DL (ref 70–99)
GLUCOSE BLDC GLUCOMTR-MCNC: 173 MG/DL (ref 70–99)
GLUCOSE BLDC GLUCOMTR-MCNC: 181 MG/DL (ref 70–99)
GLUCOSE SERPL-MCNC: 139 MG/DL (ref 70–99)
GLUCOSE SERPL-MCNC: 153 MG/DL (ref 70–99)
HCO3 BLD-SCNC: 28 MMOL/L (ref 21–28)
HCO3 BLD-SCNC: 29 MMOL/L (ref 21–28)
HCT VFR BLD AUTO: 20.8 % (ref 35–47)
HCT VFR BLD AUTO: 27.5 % (ref 35–47)
HGB BLD-MCNC: 6.5 G/DL (ref 11.7–15.7)
HGB BLD-MCNC: 8.8 G/DL (ref 11.7–15.7)
INTERPRETATION ECG - MUSE: NORMAL
LACTATE BLD-SCNC: 0.5 MMOL/L (ref 0.7–2)
MAGNESIUM SERPL-MCNC: 2.3 MG/DL (ref 1.6–2.3)
MCH RBC QN AUTO: 30.2 PG (ref 26.5–33)
MCH RBC QN AUTO: 30.3 PG (ref 26.5–33)
MCHC RBC AUTO-ENTMCNC: 31.3 G/DL (ref 31.5–36.5)
MCHC RBC AUTO-ENTMCNC: 32 G/DL (ref 31.5–36.5)
MCV RBC AUTO: 95 FL (ref 78–100)
MCV RBC AUTO: 97 FL (ref 78–100)
O2/TOTAL GAS SETTING VFR VENT: 60 %
O2/TOTAL GAS SETTING VFR VENT: 60 %
PCO2 BLD: 44 MM HG (ref 35–45)
PCO2 BLD: 49 MM HG (ref 35–45)
PH BLD: 7.38 PH (ref 7.35–7.45)
PH BLD: 7.42 PH (ref 7.35–7.45)
PHOSPHATE SERPL-MCNC: 2.9 MG/DL (ref 2.5–4.5)
PLATELET # BLD AUTO: 83 10E9/L (ref 150–450)
PLATELET # BLD AUTO: 90 10E9/L (ref 150–450)
PO2 BLD: 116 MM HG (ref 80–105)
PO2 BLD: 74 MM HG (ref 80–105)
POSACONAZOLE SERPL-MCNC: <0.2 UG/ML (ref 0.7–5)
POTASSIUM SERPL-SCNC: 4.3 MMOL/L (ref 3.4–5.3)
POTASSIUM SERPL-SCNC: 4.4 MMOL/L (ref 3.4–5.3)
PROT SERPL-MCNC: 5.6 G/DL (ref 6.8–8.8)
PROT SERPL-MCNC: 6.1 G/DL (ref 6.8–8.8)
RBC # BLD AUTO: 2.15 10E12/L (ref 3.8–5.2)
RBC # BLD AUTO: 2.9 10E12/L (ref 3.8–5.2)
SODIUM SERPL-SCNC: 135 MMOL/L (ref 133–144)
SODIUM SERPL-SCNC: 137 MMOL/L (ref 133–144)
SPECIMEN SOURCE: NORMAL
SPECIMEN SOURCE: NORMAL
TACROLIMUS BLD-MCNC: 5.3 UG/L (ref 5–15)
TME LAST DOSE: NORMAL H
TRANSFUSION STATUS PATIENT QL: NORMAL
TRANSFUSION STATUS PATIENT QL: NORMAL
WBC # BLD AUTO: 11.3 10E9/L (ref 4–11)
WBC # BLD AUTO: 5.7 10E9/L (ref 4–11)

## 2021-02-09 PROCEDURE — 250N000009 HC RX 250: Performed by: STUDENT IN AN ORGANIZED HEALTH CARE EDUCATION/TRAINING PROGRAM

## 2021-02-09 PROCEDURE — 250N000013 HC RX MED GY IP 250 OP 250 PS 637: Performed by: INTERNAL MEDICINE

## 2021-02-09 PROCEDURE — 99291 CRITICAL CARE FIRST HOUR: CPT | Mod: GC | Performed by: INTERNAL MEDICINE

## 2021-02-09 PROCEDURE — 82803 BLOOD GASES ANY COMBINATION: CPT | Performed by: STUDENT IN AN ORGANIZED HEALTH CARE EDUCATION/TRAINING PROGRAM

## 2021-02-09 PROCEDURE — 250N000009 HC RX 250: Performed by: NURSE PRACTITIONER

## 2021-02-09 PROCEDURE — 999N000157 HC STATISTIC RCP TIME EA 10 MIN

## 2021-02-09 PROCEDURE — 272N000078 HC NUTRITION PRODUCT INTERMEDIATE LITER

## 2021-02-09 PROCEDURE — 86900 BLOOD TYPING SEROLOGIC ABO: CPT | Performed by: INTERNAL MEDICINE

## 2021-02-09 PROCEDURE — 258N000003 HC RX IP 258 OP 636: Performed by: STUDENT IN AN ORGANIZED HEALTH CARE EDUCATION/TRAINING PROGRAM

## 2021-02-09 PROCEDURE — 999N000015 HC STATISTIC ARTERIAL MONITORING DAILY

## 2021-02-09 PROCEDURE — 80197 ASSAY OF TACROLIMUS: CPT | Performed by: INTERNAL MEDICINE

## 2021-02-09 PROCEDURE — 250N000012 HC RX MED GY IP 250 OP 636 PS 637: Performed by: INTERNAL MEDICINE

## 2021-02-09 PROCEDURE — 999N000185 HC STATISTIC TRANSPORT TIME EA 15 MIN

## 2021-02-09 PROCEDURE — 36415 COLL VENOUS BLD VENIPUNCTURE: CPT | Performed by: STUDENT IN AN ORGANIZED HEALTH CARE EDUCATION/TRAINING PROGRAM

## 2021-02-09 PROCEDURE — 87040 BLOOD CULTURE FOR BACTERIA: CPT | Performed by: STUDENT IN AN ORGANIZED HEALTH CARE EDUCATION/TRAINING PROGRAM

## 2021-02-09 PROCEDURE — 99233 SBSQ HOSP IP/OBS HIGH 50: CPT | Mod: GC | Performed by: INTERNAL MEDICINE

## 2021-02-09 PROCEDURE — 250N000011 HC RX IP 250 OP 636: Performed by: STUDENT IN AN ORGANIZED HEALTH CARE EDUCATION/TRAINING PROGRAM

## 2021-02-09 PROCEDURE — 71045 X-RAY EXAM CHEST 1 VIEW: CPT | Mod: 26 | Performed by: RADIOLOGY

## 2021-02-09 PROCEDURE — 94640 AIRWAY INHALATION TREATMENT: CPT | Mod: 76

## 2021-02-09 PROCEDURE — 93010 ELECTROCARDIOGRAM REPORT: CPT | Performed by: INTERNAL MEDICINE

## 2021-02-09 PROCEDURE — 83605 ASSAY OF LACTIC ACID: CPT | Performed by: STUDENT IN AN ORGANIZED HEALTH CARE EDUCATION/TRAINING PROGRAM

## 2021-02-09 PROCEDURE — 80053 COMPREHEN METABOLIC PANEL: CPT | Performed by: STUDENT IN AN ORGANIZED HEALTH CARE EDUCATION/TRAINING PROGRAM

## 2021-02-09 PROCEDURE — 85027 COMPLETE CBC AUTOMATED: CPT | Performed by: NURSE PRACTITIONER

## 2021-02-09 PROCEDURE — 200N000002 HC R&B ICU UMMC

## 2021-02-09 PROCEDURE — 71045 X-RAY EXAM CHEST 1 VIEW: CPT

## 2021-02-09 PROCEDURE — 250N000011 HC RX IP 250 OP 636: Performed by: NURSE PRACTITIONER

## 2021-02-09 PROCEDURE — 83735 ASSAY OF MAGNESIUM: CPT | Performed by: INTERNAL MEDICINE

## 2021-02-09 PROCEDURE — 258N000003 HC RX IP 258 OP 636: Performed by: NURSE PRACTITIONER

## 2021-02-09 PROCEDURE — 93005 ELECTROCARDIOGRAM TRACING: CPT

## 2021-02-09 PROCEDURE — 84100 ASSAY OF PHOSPHORUS: CPT | Performed by: INTERNAL MEDICINE

## 2021-02-09 PROCEDURE — 82330 ASSAY OF CALCIUM: CPT | Performed by: STUDENT IN AN ORGANIZED HEALTH CARE EDUCATION/TRAINING PROGRAM

## 2021-02-09 PROCEDURE — 86923 COMPATIBILITY TEST ELECTRIC: CPT | Performed by: INTERNAL MEDICINE

## 2021-02-09 PROCEDURE — 86850 RBC ANTIBODY SCREEN: CPT | Performed by: INTERNAL MEDICINE

## 2021-02-09 PROCEDURE — 250N000013 HC RX MED GY IP 250 OP 250 PS 637: Performed by: NURSE PRACTITIONER

## 2021-02-09 PROCEDURE — 250N000011 HC RX IP 250 OP 636: Performed by: INTERNAL MEDICINE

## 2021-02-09 PROCEDURE — 94668 MNPJ CHEST WALL SBSQ: CPT

## 2021-02-09 PROCEDURE — 90937 HEMODIALYSIS REPEATED EVAL: CPT

## 2021-02-09 PROCEDURE — 80187 DRUG ASSAY POSACONAZOLE: CPT | Performed by: INTERNAL MEDICINE

## 2021-02-09 PROCEDURE — 250N000013 HC RX MED GY IP 250 OP 250 PS 637: Performed by: STUDENT IN AN ORGANIZED HEALTH CARE EDUCATION/TRAINING PROGRAM

## 2021-02-09 PROCEDURE — 86901 BLOOD TYPING SEROLOGIC RH(D): CPT | Performed by: INTERNAL MEDICINE

## 2021-02-09 PROCEDURE — P9016 RBC LEUKOCYTES REDUCED: HCPCS | Performed by: INTERNAL MEDICINE

## 2021-02-09 PROCEDURE — 250N000009 HC RX 250: Performed by: CLINICAL NURSE SPECIALIST

## 2021-02-09 PROCEDURE — 94640 AIRWAY INHALATION TREATMENT: CPT

## 2021-02-09 PROCEDURE — 250N000011 HC RX IP 250 OP 636: Performed by: CLINICAL NURSE SPECIALIST

## 2021-02-09 PROCEDURE — 94667 MNPJ CHEST WALL 1ST: CPT

## 2021-02-09 PROCEDURE — 80053 COMPREHEN METABOLIC PANEL: CPT | Performed by: INTERNAL MEDICINE

## 2021-02-09 PROCEDURE — 258N000003 HC RX IP 258 OP 636: Performed by: CLINICAL NURSE SPECIALIST

## 2021-02-09 PROCEDURE — 74177 CT ABD & PELVIS W/CONTRAST: CPT | Mod: 26 | Performed by: RADIOLOGY

## 2021-02-09 PROCEDURE — 999N001017 HC STATISTIC GLUCOSE BY METER IP

## 2021-02-09 PROCEDURE — 94003 VENT MGMT INPAT SUBQ DAY: CPT

## 2021-02-09 PROCEDURE — 71260 CT THORAX DX C+: CPT

## 2021-02-09 PROCEDURE — 71260 CT THORAX DX C+: CPT | Mod: 26 | Performed by: RADIOLOGY

## 2021-02-09 PROCEDURE — 85027 COMPLETE CBC AUTOMATED: CPT | Performed by: INTERNAL MEDICINE

## 2021-02-09 RX ORDER — IOPAMIDOL 755 MG/ML
74 INJECTION, SOLUTION INTRAVASCULAR ONCE
Status: COMPLETED | OUTPATIENT
Start: 2021-02-09 | End: 2021-02-09

## 2021-02-09 RX ORDER — METOPROLOL TARTRATE 1 MG/ML
5 INJECTION, SOLUTION INTRAVENOUS ONCE
Status: DISCONTINUED | OUTPATIENT
Start: 2021-02-09 | End: 2021-02-09

## 2021-02-09 RX ORDER — NOREPINEPHRINE BITARTRATE 0.06 MG/ML
INJECTION, SOLUTION INTRAVENOUS
Status: COMPLETED
Start: 2021-02-09 | End: 2021-02-09

## 2021-02-09 RX ORDER — OXYCODONE HYDROCHLORIDE 5 MG/1
5-10 TABLET ORAL EVERY 4 HOURS PRN
Status: DISCONTINUED | OUTPATIENT
Start: 2021-02-09 | End: 2021-02-14

## 2021-02-09 RX ORDER — HEPARIN SODIUM 1000 [USP'U]/ML
500 INJECTION, SOLUTION INTRAVENOUS; SUBCUTANEOUS CONTINUOUS
Status: DISCONTINUED | OUTPATIENT
Start: 2021-02-09 | End: 2021-02-09

## 2021-02-09 RX ORDER — NOREPINEPHRINE BITARTRATE 0.06 MG/ML
0.03-0.4 INJECTION, SOLUTION INTRAVENOUS CONTINUOUS
Status: DISCONTINUED | OUTPATIENT
Start: 2021-02-09 | End: 2021-02-14

## 2021-02-09 RX ORDER — HEPARIN SODIUM 1000 [USP'U]/ML
500 INJECTION, SOLUTION INTRAVENOUS; SUBCUTANEOUS
Status: COMPLETED | OUTPATIENT
Start: 2021-02-09 | End: 2021-02-09

## 2021-02-09 RX ORDER — OLANZAPINE 2.5 MG/1
5 TABLET, FILM COATED ORAL EVERY 24 HOURS
Status: DISCONTINUED | OUTPATIENT
Start: 2021-02-09 | End: 2021-02-10

## 2021-02-09 RX ADMIN — BUDESONIDE 0.5 MG: 0.5 INHALANT ORAL at 08:04

## 2021-02-09 RX ADMIN — NYSTATIN 1000000 UNITS: 500000 SUSPENSION ORAL at 11:29

## 2021-02-09 RX ADMIN — INSULIN ASPART 1 UNITS: 100 INJECTION, SOLUTION INTRAVENOUS; SUBCUTANEOUS at 21:06

## 2021-02-09 RX ADMIN — INSULIN ASPART 2 UNITS: 100 INJECTION, SOLUTION INTRAVENOUS; SUBCUTANEOUS at 08:02

## 2021-02-09 RX ADMIN — IOPAMIDOL 74 ML: 755 INJECTION, SOLUTION INTRAVENOUS at 15:37

## 2021-02-09 RX ADMIN — PIPERACILLIN AND TAZOBACTAM 2.25 G: 2; .25 INJECTION, POWDER, FOR SOLUTION INTRAVENOUS at 14:22

## 2021-02-09 RX ADMIN — NYSTATIN 1000000 UNITS: 500000 SUSPENSION ORAL at 21:08

## 2021-02-09 RX ADMIN — LEVALBUTEROL HYDROCHLORIDE 1.25 MG: 1.25 SOLUTION RESPIRATORY (INHALATION) at 20:57

## 2021-02-09 RX ADMIN — OLANZAPINE 5 MG: 2.5 TABLET, FILM COATED ORAL at 21:08

## 2021-02-09 RX ADMIN — HEPARIN SODIUM 5000 UNITS: 5000 INJECTION, SOLUTION INTRAVENOUS; SUBCUTANEOUS at 16:33

## 2021-02-09 RX ADMIN — NYSTATIN 1000000 UNITS: 500000 SUSPENSION ORAL at 16:31

## 2021-02-09 RX ADMIN — PREDNISONE 20 MG: 20 TABLET ORAL at 08:06

## 2021-02-09 RX ADMIN — PIPERACILLIN AND TAZOBACTAM 2.25 G: 2; .25 INJECTION, POWDER, FOR SOLUTION INTRAVENOUS at 07:59

## 2021-02-09 RX ADMIN — OXYCODONE HYDROCHLORIDE 5 MG: 5 TABLET ORAL at 11:22

## 2021-02-09 RX ADMIN — LEVALBUTEROL HYDROCHLORIDE 1.25 MG: 1.25 SOLUTION RESPIRATORY (INHALATION) at 12:46

## 2021-02-09 RX ADMIN — TACROLIMUS 1.5 MG: 5 CAPSULE ORAL at 18:22

## 2021-02-09 RX ADMIN — NYSTATIN 1000000 UNITS: 500000 SUSPENSION ORAL at 07:48

## 2021-02-09 RX ADMIN — MIDAZOLAM 1 MG: 1 INJECTION INTRAMUSCULAR; INTRAVENOUS at 11:22

## 2021-02-09 RX ADMIN — Medication 40 MG: at 08:05

## 2021-02-09 RX ADMIN — POSACONAZOLE 200 MG: 40 SUSPENSION ORAL at 14:22

## 2021-02-09 RX ADMIN — HEPARIN SODIUM 5000 UNITS: 5000 INJECTION, SOLUTION INTRAVENOUS; SUBCUTANEOUS at 08:01

## 2021-02-09 RX ADMIN — BUDESONIDE 0.5 MG: 0.5 INHALANT ORAL at 20:57

## 2021-02-09 RX ADMIN — Medication 10 MG: at 21:18

## 2021-02-09 RX ADMIN — DEXMEDETOMIDINE 1.2 MCG/KG/HR: 100 INJECTION, SOLUTION, CONCENTRATE INTRAVENOUS at 14:23

## 2021-02-09 RX ADMIN — LIDOCAINE HYDROCHLORIDE 0.5 ML: 10 INJECTION, SOLUTION EPIDURAL; INFILTRATION; INTRACAUDAL; PERINEURAL at 11:12

## 2021-02-09 RX ADMIN — OLANZAPINE 5 MG: 2.5 TABLET, FILM COATED ORAL at 08:06

## 2021-02-09 RX ADMIN — SODIUM CHLORIDE 250 ML: 9 INJECTION, SOLUTION INTRAVENOUS at 11:14

## 2021-02-09 RX ADMIN — INSULIN ASPART 1 UNITS: 100 INJECTION, SOLUTION INTRAVENOUS; SUBCUTANEOUS at 04:04

## 2021-02-09 RX ADMIN — SODIUM CHLORIDE 300 ML: 9 INJECTION, SOLUTION INTRAVENOUS at 11:14

## 2021-02-09 RX ADMIN — INSULIN GLARGINE 10 UNITS: 100 INJECTION, SOLUTION SUBCUTANEOUS at 08:02

## 2021-02-09 RX ADMIN — POSACONAZOLE 200 MG: 40 SUSPENSION ORAL at 08:05

## 2021-02-09 RX ADMIN — SULFAMETHOXAZOLE AND TRIMETHOPRIM 295 MG: 200; 40 SUSPENSION ORAL at 08:08

## 2021-02-09 RX ADMIN — LEVALBUTEROL HYDROCHLORIDE 1.25 MG: 1.25 SOLUTION RESPIRATORY (INHALATION) at 15:55

## 2021-02-09 RX ADMIN — Medication 5 ML: at 08:05

## 2021-02-09 RX ADMIN — SULFAMETHOXAZOLE AND TRIMETHOPRIM 295 MG: 200; 40 SUSPENSION ORAL at 21:09

## 2021-02-09 RX ADMIN — DEXMEDETOMIDINE 0.7 MCG/KG/HR: 100 INJECTION, SOLUTION, CONCENTRATE INTRAVENOUS at 07:08

## 2021-02-09 RX ADMIN — SODIUM CHLORIDE 300 MG: 9 INJECTION, SOLUTION INTRAVENOUS at 16:33

## 2021-02-09 RX ADMIN — HEPARIN SODIUM 500 UNITS/HR: 1000 INJECTION INTRAVENOUS; SUBCUTANEOUS at 11:13

## 2021-02-09 RX ADMIN — HEPARIN SODIUM 5000 UNITS: 5000 INJECTION, SOLUTION INTRAVENOUS; SUBCUTANEOUS at 01:07

## 2021-02-09 RX ADMIN — HEPARIN SODIUM 500 UNITS: 1000 INJECTION INTRAVENOUS; SUBCUTANEOUS at 11:11

## 2021-02-09 RX ADMIN — PIPERACILLIN AND TAZOBACTAM 2.25 G: 2; .25 INJECTION, POWDER, FOR SOLUTION INTRAVENOUS at 01:44

## 2021-02-09 RX ADMIN — PIPERACILLIN AND TAZOBACTAM 2.25 G: 2; .25 INJECTION, POWDER, FOR SOLUTION INTRAVENOUS at 21:07

## 2021-02-09 RX ADMIN — LEVALBUTEROL HYDROCHLORIDE 1.25 MG: 1.25 SOLUTION RESPIRATORY (INHALATION) at 08:04

## 2021-02-09 RX ADMIN — TACROLIMUS 1 MG: 5 CAPSULE ORAL at 08:09

## 2021-02-09 RX ADMIN — DEXMEDETOMIDINE 1.2 MCG/KG/HR: 100 INJECTION, SOLUTION, CONCENTRATE INTRAVENOUS at 21:07

## 2021-02-09 RX ADMIN — INSULIN ASPART 3 UNITS: 100 INJECTION, SOLUTION INTRAVENOUS; SUBCUTANEOUS at 11:29

## 2021-02-09 ASSESSMENT — ACTIVITIES OF DAILY LIVING (ADL)
ADLS_ACUITY_SCORE: 18

## 2021-02-09 ASSESSMENT — MIFFLIN-ST. JEOR: SCORE: 1106

## 2021-02-09 NOTE — PROGRESS NOTES
HEMODIALYSIS TREATMENT NOTE    Date: 2/9/2021  Time: 3:35 PM    Data:  Pre Wt: 55.7 kg (122 lb 12.7 oz)   Desired Wt: 52.7 kg   Post Wt: 52.7 kg (116 lb 2.9 oz)  Weight change: 3 kg  Ultrafiltration - Post Run Net Total Removed (mL): 3000 mL  Vascular Access Status: Fistula and Graft  patent  Dialyzer Rinse: Streaked  Total Blood Volume Processed: (P) 56.8 L   Total Dialysis (Treatment) Time: 3   Dialysate Bath: K 3, Ca 2.25  Heparin 500 units loading + 500 units/hr    Lab:   No    Interventions:Assessment:  Tx complete. AVF utilized for arterial and AVG utilized for venous. Arterial is thin but 15 gauge needles used. Thrill and bruit present. Lidocaine administered at each site before cannulation.  as arterial pressure alarms when pt bends arm. VSS throughout tx. 3L of fluid obtained this tx.  0.5 mL of heparin bolus given and 0.5 mL per hour per order, see MAR. Arterial site held for 10 minutes, venous site held for 5 minutes. Report given to ICU nurse.     Plan:    Per nephrology team.

## 2021-02-09 NOTE — PLAN OF CARE
ICU End of Shift Summary. See flowsheets for vital signs and detailed assessment.    Changes this shift: Sedation regimen changed to precedex  both fentanyl and versed were titrated off.  Anxiety appears to improved with precedex 0.6 mcg/kg/H.  Veletri turned off, ABG obtained I hour post PO2 dropped from 83 to 60.  PEEP increased to 10 again, ABG obtained one hour post revealed  PO2 72.  Pt worked w/PT and dangled at BS.  One large BM today.  Ranjan at BS throughout the day and getting updates as well     Plan:  Continue to monitor, assess and w/goals of care.    Problem: Hypertension Comorbidity  Goal: Blood Pressure in Desired Range  Outcome: No Change

## 2021-02-09 NOTE — PROGRESS NOTES
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 She was transferred from OSH ICU 1/22 and intubated and requiring vasopressors. Found to have PCP pneumonia.    Changes Today  - A- line pulled   - Repeat CBC post 1U PRBC for hgb   - Obtain ABG for increased work of breathing likely anxiety, PRN Fent and Versed. Increased Precedex titration to max of 1.2mcg/kg/hr   - Continue treatment with bactrim, prednisone taper  - Continue zosyn empiric treatment, will discuss ongoing plan with pulm  -Continue PEEP increased at 10, follow  ABG,  - Consider obtaining CT chest 2/10 to assess for worsening ARDS vs fibrosis  - Transitioned to iHD today    PLAN:    Neuro:  # Pain and sedation  - Off fentanyl and versed ggts,  On PRN Versed 1mg Q2hr, &Fentanyl 25-50mcq Q1hr  -  Increased Precedex titration to max of 1.2mcg/kg/hr      # Delirium   - Will add PRN and scheduled zyprexa       #Insomnia  - Melatonin      #Unequal pupils  Patient with alternating fixed and dilated pupils. CTH unremarkable. Neuro exam otherwise unremarkable  - Optho consulted, no acute concerns     Pulmonary:  #Acute Hypoxic Respiratory Failure,  Suspected 2/2 PJP initially. OSH CT 1/23 + bilateral ground glass opacities and consolidative lung infiltrates centrally distributed to the upper lobes and RLL, . Covid-19 negative X3 at OSH. Given acute and severe decompensation, concern for secondary pneumonia. 2/4 CT chest which displayed worsening consolidations  consistent with progression of pneumocystis with the possibility of superimposed secondary pneumonia.  - PJP PCR positive, returned on 1/28  - Continue micro work up and management as below  - Flolan stopped this AM  - Started on iHD  - Maintain supine position for now  - Care conference 2/5, continue restorative measures, DNR/comfort cares conversation ongoing  - Obtain ABG for increased work of breathing likely anxiety, PRN Fent and Versed. 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  - Positive PJP PCR on 1/28 with positive fungitel   - Continue PTA tacrolimus  - Prednisone taper, holding home dose for now  - Pulmonary lung transplant team consulted and following, appreciate recs     Cardiovascular:  #Shock- resolved  #Hx Hypertension  Patient ith hypotension likely related to septic shock with some contribution of propofol. Last ECHO 10/21/20 with EF 60-65%, normal LV & RV function.   - weaned off levo 2/7  - Hold PTA coreg and amlodipine      # Atrial fibrillation w/ RVR  Patient with pAF with rates into 130-40s. Likely in the setting of acute pulmoary illness. Will monitor with addition of precedex     GI/Nutrition:  # Portal HTN  Liver biopsy positive for congestive hepatopathy. Previous had ascites thought to be r/t elevated right sided heart pressures. Last saw GI on 12/21/20 and patient endorsed that her ascites is no  longer present. Etiology unclear.   - NTD     # Nutrition  - NJT placed by rads, continue TF     # Constipation- resolved  Patient reportedly has history of constipation with bactrim use, now stooling  - Scheduled miralax ordered  - PRN dulcolax and senna-doc   - Currently lose stool, per rectal pouch      Renal/Fluids/Electrolytes:  # ESRD on iHD twice weekly (M/Th).   # Anion gap metabolic acidosis r/t ESRD, uremia- Resolved  # On CRRT  Outpatient dry body weight 56.5 kg.  - Nephrology Consulted   - CRRT initiated 2/4, goal net negative 0-1 L to achieve dry weight, stopped 2/8, transitioned to iHD 2/9  - I&O  - Daily Weights   - Trend BMP      # Hypokalemia  - Dialysis as above     Endocrine:  # Seronegative RA with Raynaud's   - NTD, monitor   # Hyperglycemia, steroid induced  - High intensity sliding scale insulin  - 10 units glargine daily     ID:  # Sepsis with Shock  Suspected related to PCP pneumonia. OSH CT 1/23 + bilateral ground glass opacities and consolidative lung infiltrates centrally distributed to the upper lobes and RLL. Covid-19 negative X3 at OSH. Procalcitonin negative. Urine strep, CMV, and RPP negative. Given acute and severe decompensation, concern for secondary pneumonia. 2/4 CT chest which displayed worsening consolidations consistent with progression of pneumocystis with the possibility of superimposed secondary pneumonia.  - PJP PCR positive, fungitell positive as well  - Continue treatment with bactrim, prednisone taper  - Continue zosyn empiric treatment, plan for 10 days (ending 2/12)  - s/p thora 1/25, fluid exudative, negative w/u  - s/p 7 day course of empiric ceftriaxone      # Chronic/Stable right aspergillus empyema   - Continue posaconazole, treatment dosing, will monitor QTc (509 on 2/8)  - Discussed discontinuing posa with ID, recommended not discontinuing during acute illness     Hematology:    # Anemia r/t renal disease, chronic stable   # Thrombocytopenia r/t critical  illness  # Leukocytosis, infection/steroids  Takes aranesp 25 mcg every four weeks, last dose 01/14. Next dose 2/11  - Daily CBC     Musculoskeletal:  #Weakness/Deconditioning of critical illness  - PT/OT consulted     Skin:  # Dermatomyositis antitryptase syndrome   - Noted, NTD     General Cares/Prophylaxis:    DVT Prophylaxis: Heparin SQ  GI Prophylaxis: PPI  Restraints: Not indicated  Family Communication: Plan to update  at patient bedside.   Code Status: Full      Lines/tubes/drains:  - HD fistula left forearm  - CVC RIJ  - PIV  - A-line  - NJT  - Rectal pouch  - ETT     Disposition:  - Medical ICU     Patient seen and findings/plan discussed with medical ICU staff, Dr. Alcantara.    Luis Angel Quniones MD  Internal Medicine, PGY-1  [facility]  Pager: [phone]       INTERVAL HISTORY:   No acute event overnight. Continue to be anxious breathing over the vent. Able to follow commands, denies pain and indicates she is anxious with head nodding.  with patient at the bedside, all questions answered.     OBJECTIVE     Temp:  [96.7  F (35.9  C)-98  F (36.7  C)] 97.6  F (36.4  C)  Pulse:  [] 116  Resp:  [20-31] 31  BP: (147)/(64) 147/64  MAP:  [55 mmHg-129 mmHg] 114 mmHg  Arterial Line BP: ()/() 165/71  FiO2 (%):  [55 %-60 %] 55 %  SpO2:  [90 %-100 %] 91 %    Ventilation Mode: CMV/AC  (Continuous Mandatory Ventilation/ Assist Control)  FiO2 (%): 55 %  Rate Set (breaths/minute): 24 breaths/min  Tidal Volume Set (mL): 320 mL  PEEP (cm H2O): 10 cmH2O  Oxygen Concentration (%): 60 %  Resp: (!) 31      Physical Exam  GEN: Laying in bed, anxious looking, intubated and alert on precedex gtts     NEURO: On sedation, eyes open and following commands.   RESP: Diffused coarse breath sounds , no wheezing this AM  CV: Pulses intact, tachy regular with frequent PVC's  ABD: Soft, NT, ND, BS+, loose stool via rectal pouch in place  EXT: wwp  SKIN: No erythema, Left AV fistula with thrill  and bruit    I/O last 3 completed shifts:  In: 2520.3 [I.V.:874.3; NG/GT:566]  Out: 1810 [Other:1810]    Vitals:    02/07/21 0000 02/08/21 0400 02/09/21 0400   Weight: 58.1 kg (128 lb 1.4 oz) 57.8 kg (127 lb 6.8 oz) 55.7 kg (122 lb 12.7 oz)       ABG / VBG:   Recent Labs   Lab Test 02/09/21  0403 02/08/21  1200 02/08/21  0956 01/31/21  0441 01/31/21  0441 01/29/21  0416 01/29/21 0416   PH 7.42 7.40 7.39   < >  --    < >  --    PCO2 44 47* 45   < >  --    < >  --    PO2 116* 72* 60*   < >  --    < >  --    O2PER 60.0 60 60   < > 70.0   < > 50.0   PHV  --   --   --   --  7.43  --  7.54*   PCO2V  --   --   --   --  41  --  35*    < > = values in this interval not displayed.       BMP:   Recent Labs   Lab Test 02/09/21  0403 02/08/21  1611 02/08/21  0956 02/08/21  0450 01/24/21  1529 01/24/21  1529 10/21/19  1459 10/21/19  1459 09/11/19  2325 09/11/19  2325    136 139 137   < >  --    < >  --    < > 133   POTASSIUM 4.4 4.5 3.9 4.1   < >  --    < >  --    < > 4.2   CHLORIDE 105 104 106 105   < >  --    < >  --    < > 100   CO2 27 29 27 27   < >  --    < >  --    < > 22   ANIONGAP 5 3 6 6   < >  --    < >  --    < > 11   LACT  --   --   --   --   --  0.7  --  0.5*  --  1.0   BUN 28 21 21 21   < >  --    < >  --    < > 24   CR 1.38* 0.96 1.01 1.06*   < >  --    < >  --    < > 1.28*   * 141* 156* 146*   < >  --    < >  --    < > 76   CHELSEY 8.8 8.9 9.0 8.8   < >  --    < >  --    < > 9.2   MAG 2.3 2.3  --  2.4*   < >  --    < >  --    < >  --    PHOS 2.9 2.7  --  2.8   < >  --    < >  --    < >  --     < > = values in this interval not displayed.       Hepatic Studies:   Recent Labs   Lab Test 02/09/21  0403 02/08/21  0450 02/07/21  0400   AST <3 4 3   ALT 28 29 31   ALKPHOS 182* 178* 166*   BILITOTAL 0.3 0.4 0.4   ALBUMIN 1.8* 2.0* 2.0*       Heme Studies:   Recent Labs   Lab Test 02/09/21  0403 02/08/21  0450 02/07/21  0400 12/23/20  0838 12/23/20  0838 11/20/20  0820 11/20/20  0820 09/09/20  0822  08/13/20  1020 08/13/20  1020 03/10/20  0954   WBC 5.7 9.2 13.1*   < > 5.5   < > 6.3 13.1*   < >  --  6.3   ANEU  --   --   --   --   --   --   --  10.7*  --   --  5.0   ALYM  --   --   --   --   --   --   --  0.9  --   --  0.5*   AEOS  --   --   --   --   --   --   --  0.2  --   --  0.1   HGB 6.5* 7.5* 7.8*   < > 12.4   < > 10.3* 11.9   < >  --  10.3*   MCV 97 94 92   < > 97   < > 97 99   < >  --  100   PLT 83* 101* 143*   < > 149*   < > 161 261   < >  --  113*   INR  --   --   --   --  1.02  --  0.94  --   --  1.06  --     < > = values in this interval not displayed.       Iron Studies:   Recent Labs   Lab Test 02/03/21  0415 12/13/18  1033 08/01/18  0921 05/08/18  0709   IRON 51 16* 93 48   * 221* 248 275   IRONSAT 36 7* 37 18   YOLA  --  302* 571*  --        Urine Studies:   Recent Labs   Lab Test 01/24/21  1729 10/21/19  2240   SG 1.023 1.018   URINEPH 5.0 5.0   NITRITE Negative Negative   LEUKEST Moderate* Large*   WBCU 34* 115*   RBCU 26* 25*   PROTEIN 30* 30*       Microbiology:  No results found for: RS, FLUAG    Recent Labs   Lab Test 02/03/21  0941 02/03/21  0924 02/03/21  0900 02/03/21  0816 01/25/21  1347 01/24/21  1841 01/24/21  1830 01/24/21  1729   CULT No growth  --  No growth No growth Culture negative after 2 weeks  No anaerobes isolated  No growth  Culture received and in progress.  Positive AFB results are called as soon as detected.    Final report to follow in 7 to 8 weeks.    Assayed at [facility] [address] [place] [id number] [phone] No growth No growth after 15 days No growth   SDES Sputum  Sputum Nares Blood PICC Blood Right Hand Pleural fluid  Pleural fluid  Pleural fluid  Pleural fluid  Pleural fluid  Pleural fluid Blood Right Hand Blood Catheterized Urine  Catheterized Urine  Urine       Imaging:  No results found for this or any previous visit (from the past 24 hour(s)).

## 2021-02-09 NOTE — PROGRESS NOTES
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 Nephrology consulted for dialysis needs.           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 Nephrology consutled for dialysis needs.     Melissa Estevez on 2/9/2021 at 11:26 AM   -Transitioning to iHD today, 3h, 2-3L of UF.    -Pulled temp HD line.        Seen and discussed with Dr Swanson     Recommendations were communicated to primary team via verbal communication.         ADRIÁN Lo CNS  Clinical Nurse Specialist  [phone]          Medical Student Interval History MARIA A Interval History   Medical student: Interval History  Mrs. Blue is doing well this AM, still on the vent but more alert today. CRRT was stopped overnight. Will run iHD today try to pull 2-3L over 3 hours. Will use her fistula today, and if successful, will plan to pull her line to decrease infection risk. Labs and Bps are stable today.     Review of Systems  ROS not done due to vent/sedation. Mrs Blue had CRRT stopped yesterday with plans to transition to iHD today.  Planned for 3h, 2-3L of UF to try to be a bit net negative today and plan to give tomorrow off of HD.  " I pulled her temp HD line to prevent infection.      Review of Systems:   ROS not done due to vent/sedation.       Physical Exam (Student)  Vitals were reviewed  BP (!) 147/64 (BP Location: Right leg)   Pulse 116   Temp 97.8  F (36.6  C) (Axillary)   Resp 30   Ht 1.6 m (5' 2.99\")   Wt 55.7 kg (122 lb 12.7 oz)   LMP 06/07/2014 (Exact Date)   SpO2 98%   BMI 21.76 kg/m        Intake/Output Summary (Last 24 hours) at 2/9/2021 1123  Last data filed at 2/9/2021 1000  Gross per 24 hour   Intake 2655.77 ml   Output 1061 ml   Net 1594.77 ml      GENERAL APPEARANCE: intubated  EYES: no scleral icterus  Endo: no moon facies  Pulmonary: Intubated, equal expansion.    CV: regular rhythm, normal rate - Edema present  GI: soft, non distended  MS: no evidence of inflammation in joints, no muscle tenderness  :  Basilio present  SKIN: warm, dry, +1 edema.    NEURO: drowsy    Physical Exam (Physician)  Vitals were reviewed  BP (!) 147/64 (BP Location: Right leg)   Pulse 116   Temp 97.8  F (36.6  C) (Axillary)   Resp 30   Ht 1.6 m (5' 2.99\")   Wt 55.7 kg (122 lb 12.7 oz)   LMP 06/07/2014 (Exact Date)   SpO2 98%   BMI 21.76 kg/m       Intake/Output Summary (Last 24 hours) at 2/9/2021 1123  Last data filed at 2/9/2021 1000  Gross per 24 hour   Intake 2655.77 ml   Output 1061 ml   Net 1594.77 ml        GENERAL APPEARANCE: intubated, awake but not consistently interactive.   EYES: no scleral icterus  Endo: no moon facies  Pulmonary: Intubated, equal expansion.    CV: regular rhythm, normal rate - Edema present  GI: soft, non distended  MS: no evidence of inflammation in joints, no muscle tenderness  :  Basilio present  SKIN: warm, dry, +1 edema.    NEURO: drowsy     Labs:   The following labs were reviewed:   CMP  Recent Labs   Lab 02/09/21  0403 02/08/21  1611 02/08/21  0956 02/08/21  0450 02/07/21  1600 02/07/21  1600 02/07/21  0400 02/07/21  0400 02/06/21  0345 02/06/21  0345    136 139 137   < > 138   < > 137   < " > 137   POTASSIUM 4.4 4.5 3.9 4.1   < > 5.2   < > 4.5   < > 3.9   CHLORIDE 105 104 106 105   < > 107   < > 106   < > 106   CO2 27 29 27 27   < > 26   < > 26   < > 24   ANIONGAP 5 3 6 6   < > 5   < > 6   < > 7   * 141* 156* 146*   < > 160*   < > 118*   < > 119*   BUN 28 21 21 21   < > 24   < > 23   < > 30   CR 1.38* 0.96 1.01 1.06*   < > 1.10*   < > 1.17*   < > 1.59*   GFRESTIMATED 42* 65 61 58*   < > 55*   < > 51*   < > 35*   GFRESTBLACK 49* 75 71 67   < > 64   < > 59*   < > 41*   CHELSEY 8.8 8.9 9.0 8.8   < > 8.2*   < > 8.4*   < > 7.7*   MAG 2.3 2.3  --  2.4*  --  2.6*  --  2.6*   < > 2.3   PHOS 2.9 2.7  --  2.8  --  4.0  --  4.0   < > 3.9   PROTTOTAL 5.6*  --   --  6.1*  --   --   --  6.0*  --  6.0*   ALBUMIN 1.8*  --   --  2.0*  --   --   --  2.0*  --  1.9*   BILITOTAL 0.3  --   --  0.4  --   --   --  0.4  --  0.4   ALKPHOS 182*  --   --  178*  --   --   --  166*  --  142   AST <3  --   --  4  --   --   --  3  --  3   ALT 28  --   --  29  --   --   --  31  --  32    < > = values in this interval not displayed.     CBC  Recent Labs   Lab 02/09/21  0403 02/08/21  0450 02/07/21  0400 02/06/21  0345   HGB 6.5* 7.5* 7.8* 8.1*   WBC 5.7 9.2 13.1* 14.2*   RBC 2.15* 2.50* 2.61* 2.71*   HCT 20.8* 23.6* 23.9* 24.9*   MCV 97 94 92 92   MCH 30.2 30.0 29.9 29.9   MCHC 31.3* 31.8 32.6 32.5   RDW 17.6* 17.2* 16.9* 16.2*   PLT 83* 101* 143* 154     INRNo lab results found in last 7 days.  ABG  Recent Labs   Lab 02/09/21  0403 02/08/21  1200 02/08/21  0956 02/08/21  0450   PH 7.42 7.40 7.39 7.41   PCO2 44 47* 45 43   PO2 116* 72* 60* 83   HCO3 28 29* 28 28   O2PER 60.0 60 60 60.0      URINE STUDIES  Recent Labs   Lab Test 01/24/21  1729 10/21/19  2240 09/12/19  0125 08/07/19  1512   COLOR Yellow Yellow Light Yellow Yellow   APPEARANCE Slightly Cloudy Cloudy Clear Clear   URINEGLC Negative Negative Negative Negative   URINEBILI Negative Negative Negative Negative   URINEKETONE 5* Negative 10* Negative   SG 1.023 1.018 1.008  1.016   UBLD Small* Trace* Negative Negative   URINEPH 5.0 5.0 7.5* 7.0   PROTEIN 30* 30* 30* 100*   NITRITE Negative Negative Negative Negative   LEUKEST Moderate* Large* Negative Trace*   RBCU 26* 25* <1 10*   WBCU 34* 115* 3 19*     Recent Labs   Lab Test 08/07/19  1512   UTPG 2.47*     PTH  No lab results found.  IRON STUDIES  Recent Labs   Lab Test 02/03/21  0415 12/13/18  1033 08/01/18  0921 05/08/18  0709   IRON 51 16* 93 48   * 221* 248 275   IRONSAT 36 7* 37 18   YOLA  --  302* 571*  --      Imaging:      Current Medications:    B and C vitamin Complex with folic acid  5 mL Oral or Feeding Tube Daily     budesonide  0.5 mg Nebulization BID     gelatin absorbable  1 each Topical During Hemodialysis (from stock)     heparin ANTICOAGULANT  5,000 Units Subcutaneous Q8H     insulin aspart  1-12 Units Subcutaneous Q4H     insulin glargine  10 Units Subcutaneous QAM AC     levalbuterol  1.25 mg Nebulization 4x Daily     nystatin  1,000,000 Units Mouth/Throat 4x Daily     OLANZapine  5 mg Oral or Feeding Tube Q24H     pantoprazole  40 mg Oral QAM AC     piperacillin-tazobactam  2.25 g Intravenous Q6H     polyethylene glycol  17 g Oral or Feeding Tube BID     posaconazole  200 mg Oral TID     [Held by provider] predniSONE  2.5 mg Oral or Feeding Tube QPM     predniSONE  20 mg Oral Daily     [Held by provider] predniSONE  5 mg Oral or Feeding Tube QAM     sulfamethoxazole-trimethoprim  295 mg Oral or Feeding Tube BID     [START ON 2/10/2021] tacrolimus  1.5 mg Oral or Feeding Tube Q24H     tacrolimus  1.5 mg Oral or Feeding Tube Q24H     valGANciclovir  450 mg Oral Once per day on Mon Thu       dexmedetomidine 0.9 mcg/kg/hr (02/09/21 1000)     dextrose       heparin (porcine) 500 Units/hr (02/09/21 1113)     sodium chloride 10 mL/hr at 02/08/21 0400     - MEDICATION INSTRUCTIONS -       Melissa Estevez

## 2021-02-09 NOTE — PLAN OF CARE
4C PT cx: held PT in AM until following dialysis but at time of PM contact patient preparing to leave floor for CT and requesting to rest upon return to room later. Will reschedule.

## 2021-02-09 NOTE — PLAN OF CARE
ICU End of Shift Summary. See flowsheets for vital signs and detailed assessment.    Changes this shift: VSS, remains off of pressors. ABG WNL, FiO2 weaned to 55%. CRRT taken down at 1930. Anxiety & insomnia overnight. Despite PRN Zyprexa & Melatonin and further one time dose Zyprexa, pt didn't sleep. Dex at 0.7 all night. 1uRBC this am for hgb 6.5. Multiple loose stools overnight, rectal pouch reapplied. Bladder scanned for 21cc.     Plan:  HD run today. CT chest per MD note.

## 2021-02-09 NOTE — PROGRESS NOTES
RT advanced ETT 1 cm per MD's order. ETT is now 21@ the lips. Follow up chest xray to confirm placement.     RT will continue to monitor.

## 2021-02-10 ENCOUNTER — APPOINTMENT (OUTPATIENT)
Dept: OCCUPATIONAL THERAPY | Facility: CLINIC | Age: 61
End: 2021-02-10
Attending: INTERNAL MEDICINE
Payer: COMMERCIAL

## 2021-02-10 LAB
ANION GAP SERPL CALCULATED.3IONS-SCNC: 7 MMOL/L (ref 3–14)
BASE EXCESS BLDA CALC-SCNC: 0.8 MMOL/L
BASE EXCESS BLDA CALC-SCNC: 1.3 MMOL/L
BUN SERPL-MCNC: 32 MG/DL (ref 7–30)
CALCIUM SERPL-MCNC: 8.6 MG/DL (ref 8.5–10.1)
CHLORIDE SERPL-SCNC: 102 MMOL/L (ref 94–109)
CO2 SERPL-SCNC: 27 MMOL/L (ref 20–32)
CREAT SERPL-MCNC: 1.65 MG/DL (ref 0.52–1.04)
ERYTHROCYTE [DISTWIDTH] IN BLOOD BY AUTOMATED COUNT: 18.3 % (ref 10–15)
ERYTHROCYTE [DISTWIDTH] IN BLOOD BY AUTOMATED COUNT: 18.5 % (ref 10–15)
GFR SERPL CREATININE-BSD FRML MDRD: 34 ML/MIN/{1.73_M2}
GLUCOSE BLDC GLUCOMTR-MCNC: 144 MG/DL (ref 70–99)
GLUCOSE BLDC GLUCOMTR-MCNC: 145 MG/DL (ref 70–99)
GLUCOSE BLDC GLUCOMTR-MCNC: 159 MG/DL (ref 70–99)
GLUCOSE BLDC GLUCOMTR-MCNC: 167 MG/DL (ref 70–99)
GLUCOSE BLDC GLUCOMTR-MCNC: 168 MG/DL (ref 70–99)
GLUCOSE BLDC GLUCOMTR-MCNC: 183 MG/DL (ref 70–99)
GLUCOSE BLDC GLUCOMTR-MCNC: 184 MG/DL (ref 70–99)
GLUCOSE SERPL-MCNC: 198 MG/DL (ref 70–99)
HCO3 BLD-SCNC: 26 MMOL/L (ref 21–28)
HCO3 BLD-SCNC: 26 MMOL/L (ref 21–28)
HCT VFR BLD AUTO: 22 % (ref 35–47)
HCT VFR BLD AUTO: 24 % (ref 35–47)
HGB BLD-MCNC: 7.1 G/DL (ref 11.7–15.7)
HGB BLD-MCNC: 7.6 G/DL (ref 11.7–15.7)
INTERPRETATION ECG - MUSE: NORMAL
MAGNESIUM SERPL-MCNC: 2 MG/DL (ref 1.6–2.3)
MCH RBC QN AUTO: 30.2 PG (ref 26.5–33)
MCH RBC QN AUTO: 30.6 PG (ref 26.5–33)
MCHC RBC AUTO-ENTMCNC: 31.7 G/DL (ref 31.5–36.5)
MCHC RBC AUTO-ENTMCNC: 32.3 G/DL (ref 31.5–36.5)
MCV RBC AUTO: 95 FL (ref 78–100)
MCV RBC AUTO: 95 FL (ref 78–100)
O2/TOTAL GAS SETTING VFR VENT: 50 %
O2/TOTAL GAS SETTING VFR VENT: 50 %
PCO2 BLD: 42 MM HG (ref 35–45)
PCO2 BLD: 46 MM HG (ref 35–45)
PH BLD: 7.36 PH (ref 7.35–7.45)
PH BLD: 7.41 PH (ref 7.35–7.45)
PHOSPHATE SERPL-MCNC: 2.9 MG/DL (ref 2.5–4.5)
PLATELET # BLD AUTO: 103 10E9/L (ref 150–450)
PLATELET # BLD AUTO: 84 10E9/L (ref 150–450)
PO2 BLD: 105 MM HG (ref 80–105)
PO2 BLD: 90 MM HG (ref 80–105)
POTASSIUM SERPL-SCNC: 4.4 MMOL/L (ref 3.4–5.3)
RBC # BLD AUTO: 2.32 10E12/L (ref 3.8–5.2)
RBC # BLD AUTO: 2.52 10E12/L (ref 3.8–5.2)
SODIUM SERPL-SCNC: 136 MMOL/L (ref 133–144)
TACROLIMUS BLD-MCNC: 4 UG/L (ref 5–15)
TME LAST DOSE: ABNORMAL H
WBC # BLD AUTO: 5.3 10E9/L (ref 4–11)
WBC # BLD AUTO: 7.2 10E9/L (ref 4–11)

## 2021-02-10 PROCEDURE — 85027 COMPLETE CBC AUTOMATED: CPT | Performed by: STUDENT IN AN ORGANIZED HEALTH CARE EDUCATION/TRAINING PROGRAM

## 2021-02-10 PROCEDURE — 94003 VENT MGMT INPAT SUBQ DAY: CPT

## 2021-02-10 PROCEDURE — 250N000011 HC RX IP 250 OP 636: Performed by: STUDENT IN AN ORGANIZED HEALTH CARE EDUCATION/TRAINING PROGRAM

## 2021-02-10 PROCEDURE — 250N000012 HC RX MED GY IP 250 OP 636 PS 637: Performed by: INTERNAL MEDICINE

## 2021-02-10 PROCEDURE — 250N000013 HC RX MED GY IP 250 OP 250 PS 637: Performed by: INTERNAL MEDICINE

## 2021-02-10 PROCEDURE — 80048 BASIC METABOLIC PNL TOTAL CA: CPT | Performed by: STUDENT IN AN ORGANIZED HEALTH CARE EDUCATION/TRAINING PROGRAM

## 2021-02-10 PROCEDURE — 250N000009 HC RX 250: Performed by: NURSE PRACTITIONER

## 2021-02-10 PROCEDURE — 83735 ASSAY OF MAGNESIUM: CPT | Performed by: STUDENT IN AN ORGANIZED HEALTH CARE EDUCATION/TRAINING PROGRAM

## 2021-02-10 PROCEDURE — 250N000013 HC RX MED GY IP 250 OP 250 PS 637: Performed by: NURSE PRACTITIONER

## 2021-02-10 PROCEDURE — 94640 AIRWAY INHALATION TREATMENT: CPT | Mod: 76

## 2021-02-10 PROCEDURE — P9047 ALBUMIN (HUMAN), 25%, 50ML: HCPCS | Performed by: STUDENT IN AN ORGANIZED HEALTH CARE EDUCATION/TRAINING PROGRAM

## 2021-02-10 PROCEDURE — 258N000003 HC RX IP 258 OP 636: Performed by: NURSE PRACTITIONER

## 2021-02-10 PROCEDURE — 82803 BLOOD GASES ANY COMBINATION: CPT | Performed by: INTERNAL MEDICINE

## 2021-02-10 PROCEDURE — 84100 ASSAY OF PHOSPHORUS: CPT | Performed by: STUDENT IN AN ORGANIZED HEALTH CARE EDUCATION/TRAINING PROGRAM

## 2021-02-10 PROCEDURE — 999N001017 HC STATISTIC GLUCOSE BY METER IP

## 2021-02-10 PROCEDURE — 250N000013 HC RX MED GY IP 250 OP 250 PS 637: Performed by: STUDENT IN AN ORGANIZED HEALTH CARE EDUCATION/TRAINING PROGRAM

## 2021-02-10 PROCEDURE — 999N000157 HC STATISTIC RCP TIME EA 10 MIN

## 2021-02-10 PROCEDURE — 80197 ASSAY OF TACROLIMUS: CPT | Performed by: INTERNAL MEDICINE

## 2021-02-10 PROCEDURE — 94668 MNPJ CHEST WALL SBSQ: CPT

## 2021-02-10 PROCEDURE — 250N000009 HC RX 250: Performed by: STUDENT IN AN ORGANIZED HEALTH CARE EDUCATION/TRAINING PROGRAM

## 2021-02-10 PROCEDURE — 99291 CRITICAL CARE FIRST HOUR: CPT | Mod: GC | Performed by: INTERNAL MEDICINE

## 2021-02-10 PROCEDURE — 250N000011 HC RX IP 250 OP 636: Performed by: INTERNAL MEDICINE

## 2021-02-10 PROCEDURE — 200N000002 HC R&B ICU UMMC

## 2021-02-10 PROCEDURE — 258N000003 HC RX IP 258 OP 636: Performed by: STUDENT IN AN ORGANIZED HEALTH CARE EDUCATION/TRAINING PROGRAM

## 2021-02-10 PROCEDURE — 250N000012 HC RX MED GY IP 250 OP 636 PS 637: Performed by: STUDENT IN AN ORGANIZED HEALTH CARE EDUCATION/TRAINING PROGRAM

## 2021-02-10 PROCEDURE — 272N000078 HC NUTRITION PRODUCT INTERMEDIATE LITER

## 2021-02-10 PROCEDURE — 250N000011 HC RX IP 250 OP 636: Performed by: NURSE PRACTITIONER

## 2021-02-10 PROCEDURE — 97110 THERAPEUTIC EXERCISES: CPT | Mod: GO | Performed by: OCCUPATIONAL THERAPIST

## 2021-02-10 PROCEDURE — 999N000015 HC STATISTIC ARTERIAL MONITORING DAILY

## 2021-02-10 PROCEDURE — 82803 BLOOD GASES ANY COMBINATION: CPT | Performed by: STUDENT IN AN ORGANIZED HEALTH CARE EDUCATION/TRAINING PROGRAM

## 2021-02-10 PROCEDURE — 94640 AIRWAY INHALATION TREATMENT: CPT

## 2021-02-10 PROCEDURE — 99233 SBSQ HOSP IP/OBS HIGH 50: CPT | Performed by: STUDENT IN AN ORGANIZED HEALTH CARE EDUCATION/TRAINING PROGRAM

## 2021-02-10 PROCEDURE — 99233 SBSQ HOSP IP/OBS HIGH 50: CPT | Mod: GC | Performed by: INTERNAL MEDICINE

## 2021-02-10 RX ORDER — ALBUMIN (HUMAN) 12.5 G/50ML
25 SOLUTION INTRAVENOUS ONCE
Status: COMPLETED | OUTPATIENT
Start: 2021-02-10 | End: 2021-02-10

## 2021-02-10 RX ORDER — OLANZAPINE 5 MG/1
10 TABLET ORAL EVERY 24 HOURS
Status: DISCONTINUED | OUTPATIENT
Start: 2021-02-10 | End: 2021-02-25 | Stop reason: HOSPADM

## 2021-02-10 RX ORDER — MIDODRINE HYDROCHLORIDE 5 MG/1
5 TABLET ORAL 3 TIMES DAILY
Status: DISCONTINUED | OUTPATIENT
Start: 2021-02-10 | End: 2021-02-12

## 2021-02-10 RX ADMIN — INSULIN ASPART 1 UNITS: 100 INJECTION, SOLUTION INTRAVENOUS; SUBCUTANEOUS at 23:25

## 2021-02-10 RX ADMIN — PREDNISONE 20 MG: 20 TABLET ORAL at 08:05

## 2021-02-10 RX ADMIN — LEVALBUTEROL HYDROCHLORIDE 1.25 MG: 1.25 SOLUTION RESPIRATORY (INHALATION) at 15:57

## 2021-02-10 RX ADMIN — PIPERACILLIN AND TAZOBACTAM 2.25 G: 2; .25 INJECTION, POWDER, FOR SOLUTION INTRAVENOUS at 19:33

## 2021-02-10 RX ADMIN — INSULIN GLARGINE 10 UNITS: 100 INJECTION, SOLUTION SUBCUTANEOUS at 08:07

## 2021-02-10 RX ADMIN — INSULIN ASPART 1 UNITS: 100 INJECTION, SOLUTION INTRAVENOUS; SUBCUTANEOUS at 00:07

## 2021-02-10 RX ADMIN — INSULIN ASPART 2 UNITS: 100 INJECTION, SOLUTION INTRAVENOUS; SUBCUTANEOUS at 08:06

## 2021-02-10 RX ADMIN — NYSTATIN 1000000 UNITS: 500000 SUSPENSION ORAL at 20:09

## 2021-02-10 RX ADMIN — MIDAZOLAM 0.5 MG: 1 INJECTION INTRAMUSCULAR; INTRAVENOUS at 14:11

## 2021-02-10 RX ADMIN — LEVALBUTEROL HYDROCHLORIDE 1.25 MG: 1.25 SOLUTION RESPIRATORY (INHALATION) at 12:09

## 2021-02-10 RX ADMIN — INSULIN ASPART 1 UNITS: 100 INJECTION, SOLUTION INTRAVENOUS; SUBCUTANEOUS at 19:46

## 2021-02-10 RX ADMIN — MIDODRINE HYDROCHLORIDE 5 MG: 5 TABLET ORAL at 09:47

## 2021-02-10 RX ADMIN — MIDAZOLAM 1 MG: 1 INJECTION INTRAMUSCULAR; INTRAVENOUS at 09:48

## 2021-02-10 RX ADMIN — HEPARIN SODIUM 5000 UNITS: 5000 INJECTION, SOLUTION INTRAVENOUS; SUBCUTANEOUS at 23:25

## 2021-02-10 RX ADMIN — MIDODRINE HYDROCHLORIDE 5 MG: 5 TABLET ORAL at 13:46

## 2021-02-10 RX ADMIN — Medication 40 MG: at 08:05

## 2021-02-10 RX ADMIN — INSULIN ASPART 2 UNITS: 100 INJECTION, SOLUTION INTRAVENOUS; SUBCUTANEOUS at 16:48

## 2021-02-10 RX ADMIN — DEXMEDETOMIDINE 1.2 MCG/KG/HR: 100 INJECTION, SOLUTION, CONCENTRATE INTRAVENOUS at 17:59

## 2021-02-10 RX ADMIN — ALBUMIN HUMAN 25 G: 0.25 SOLUTION INTRAVENOUS at 10:54

## 2021-02-10 RX ADMIN — INSULIN ASPART 2 UNITS: 100 INJECTION, SOLUTION INTRAVENOUS; SUBCUTANEOUS at 12:40

## 2021-02-10 RX ADMIN — SULFAMETHOXAZOLE AND TRIMETHOPRIM 295 MG: 200; 40 SUSPENSION ORAL at 19:39

## 2021-02-10 RX ADMIN — TACROLIMUS 1.5 MG: 5 CAPSULE ORAL at 08:05

## 2021-02-10 RX ADMIN — HEPARIN SODIUM 5000 UNITS: 5000 INJECTION, SOLUTION INTRAVENOUS; SUBCUTANEOUS at 08:04

## 2021-02-10 RX ADMIN — HEPARIN SODIUM 5000 UNITS: 5000 INJECTION, SOLUTION INTRAVENOUS; SUBCUTANEOUS at 16:48

## 2021-02-10 RX ADMIN — MIDODRINE HYDROCHLORIDE 5 MG: 5 TABLET ORAL at 19:39

## 2021-02-10 RX ADMIN — INSULIN ASPART 2 UNITS: 100 INJECTION, SOLUTION INTRAVENOUS; SUBCUTANEOUS at 04:04

## 2021-02-10 RX ADMIN — LEVALBUTEROL HYDROCHLORIDE 1.25 MG: 1.25 SOLUTION RESPIRATORY (INHALATION) at 20:38

## 2021-02-10 RX ADMIN — SULFAMETHOXAZOLE AND TRIMETHOPRIM 295 MG: 200; 40 SUSPENSION ORAL at 10:49

## 2021-02-10 RX ADMIN — PIPERACILLIN AND TAZOBACTAM 2.25 G: 2; .25 INJECTION, POWDER, FOR SOLUTION INTRAVENOUS at 08:03

## 2021-02-10 RX ADMIN — LEVALBUTEROL HYDROCHLORIDE 1.25 MG: 1.25 SOLUTION RESPIRATORY (INHALATION) at 08:37

## 2021-02-10 RX ADMIN — MIDAZOLAM 0.5 MG: 1 INJECTION INTRAMUSCULAR; INTRAVENOUS at 19:34

## 2021-02-10 RX ADMIN — BUDESONIDE 0.5 MG: 0.5 INHALANT ORAL at 08:37

## 2021-02-10 RX ADMIN — OLANZAPINE 10 MG: 2.5 TABLET ORAL at 19:39

## 2021-02-10 RX ADMIN — DEXMEDETOMIDINE 1.2 MCG/KG/HR: 100 INJECTION, SOLUTION, CONCENTRATE INTRAVENOUS at 03:53

## 2021-02-10 RX ADMIN — HEPARIN SODIUM 5000 UNITS: 5000 INJECTION, SOLUTION INTRAVENOUS; SUBCUTANEOUS at 00:08

## 2021-02-10 RX ADMIN — SODIUM CHLORIDE 300 MG: 9 INJECTION, SOLUTION INTRAVENOUS at 08:13

## 2021-02-10 RX ADMIN — PIPERACILLIN AND TAZOBACTAM 2.25 G: 2; .25 INJECTION, POWDER, FOR SOLUTION INTRAVENOUS at 01:51

## 2021-02-10 RX ADMIN — NYSTATIN 1000000 UNITS: 500000 SUSPENSION ORAL at 12:40

## 2021-02-10 RX ADMIN — MIDAZOLAM 0.5 MG: 1 INJECTION INTRAMUSCULAR; INTRAVENOUS at 22:49

## 2021-02-10 RX ADMIN — DEXMEDETOMIDINE 1.2 MCG/KG/HR: 100 INJECTION, SOLUTION, CONCENTRATE INTRAVENOUS at 10:49

## 2021-02-10 RX ADMIN — NYSTATIN 1000000 UNITS: 500000 SUSPENSION ORAL at 08:04

## 2021-02-10 RX ADMIN — Medication 5 ML: at 08:05

## 2021-02-10 RX ADMIN — TACROLIMUS 1.5 MG: 5 CAPSULE ORAL at 18:01

## 2021-02-10 RX ADMIN — MIDAZOLAM 0.5 MG: 1 INJECTION INTRAMUSCULAR; INTRAVENOUS at 22:23

## 2021-02-10 RX ADMIN — BUDESONIDE 0.5 MG: 0.5 INHALANT ORAL at 20:38

## 2021-02-10 RX ADMIN — PIPERACILLIN AND TAZOBACTAM 2.25 G: 2; .25 INJECTION, POWDER, FOR SOLUTION INTRAVENOUS at 13:47

## 2021-02-10 RX ADMIN — NYSTATIN 1000000 UNITS: 500000 SUSPENSION ORAL at 15:51

## 2021-02-10 ASSESSMENT — ACTIVITIES OF DAILY LIVING (ADL)
ADLS_ACUITY_SCORE: 18

## 2021-02-10 NOTE — PROGRESS NOTES
Care Management Follow Up    Length of Stay (days): 17    Spent time with Kecia and Ranjan at bedside. Kecia was very alert today.  Communicating effectively with hand gestures and writing.  Ranjan wanted to clarify Kecia's wishes.  We had reviewed her HCD last week, when she was not able to participate.  In that document, Kecia did not address quality of life issues.  Today, Kecia communicated that if she is well enough to be out of the hospital and to spend time with  Her grandchildren that would be an adequate quality of life for her.  She communicated an understanding that she may not return to the state of health she had prior to coming to hospital and was ok with that.  She also gestured she was ready to fight and agreed that recovery would be hard work.  She also nodded in agreement when I told her that her recovery could take several weeks to months.  We discussed the benefits of a trach in assisting with her rehab.      Ranjan is still considering a short term apt rental in [place].  Kecia indicated she would like him to do this.  He will look into options I provided him earlier this week.     Additional Information:  will continue to follow for support, counseling, education and resources.  Will plan to attend care conference tomorrow.        VERENICE Rose

## 2021-02-10 NOTE — PLAN OF CARE
ICU End of Shift Summary. See flowsheets for vital signs and detailed assessment.    Changes this shift: Labile BP overnight, Levo on & off a few times, restarting when MAPs in the 50's. Currently at 0.1. Sinus tach for most of the night. Patient more drowsy than last night, but still frequently on her call light, usually requesting ETT suction. Scant amount of cloudy secretions from ETT. No vent changes overnight.     Plan:  Continue with plan of care. Continue with goals of care discussion.

## 2021-02-10 NOTE — PROGRESS NOTES
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  Patient was admitted to OSH on  for acute hypoxemic respiratory failure and new lung opacities. Intubated  and transferred to [facility] for ongoing management.  Extubated  but reintubated - for progressive hypoxemia.  Rapid decompensation 2/3 with ARDS presentation requiring reintubation, prone positioning, and inhaled epoprostenol.  Significant rise in leukocytosis (2/3) concerning for worsening PJP versus secondary infection.      Today's recommendations:  - Continue the tacrolimus at 1.5 mg BID, today trend level is 4, will check another level tomorrow   - CT scan chest showed improvement, primary team discussed trach with the patient and her , she will have a care conference 2021.  Otherwise continue the antimicrobial course as below   - IV posaconazol as the level was < 0.2 with the oral Posaconazole       Acute hypoxemic respiratory failure:   Right post-obstructive Stenotrophomonas and Eikenella pneumonia:  PJP pneumonia:  Septic shock:   ARDS: Presented to OSH ED with increased SOB and hypoxia, initially requiring 4L NC but continued to decline and subsequently intubated .  Hemodynamic instability after intubation requiring pressors, transferred to [facility].  Afebrile but with leukocytosis.  COVID and respiratory panel negative.  PTA bronch (20) with moderate airway obstruction and RML/RLL mucous plugging, cultures  with Stenotrophomonas and Eikenella (IV ceftazidime 1/13-1/19).  OSH CT chest with new multifocal GGO bilaterally and increased left loculated pleural effusion.  Repeat bronch (1/24) with RUL BAL with thick copious secretions to RML/RLL, PJP PCR positive.  Extubated on 1/26, but reintubated from 1/27-1/29 for progressive hypoxemia.  Remained on either HFNC % FiO2 or BiPAP % FiO2, then eventually decompensated on 2/3 and again reintubated.  Working revealing for ARDS, proning protocol initiated (2/3) with inhaled epoprostenol, pressors initiated.  Significant rise in leukocytosis (2/3) concerning for worsening PJP vs secondary infection.  CT chest (2/3) with progressive consolidation (consistent with ARDS) and possible superimposed infection findings, stable LLL chronic empyema.  CRRT initiated 2/4 as below.  - Sputum culture/gram stain (2/3) NGTD  - Blood cultures (2/3) NGTD   - BDG fungitell (2/4) + 254 on 2/4  - ABX per MICU: Bactrim (1/24) for PJP and Steno (noted 12/23) coverage, empiric Zosyn (2/3) end date tentatively 2/12  -bactrim for the PJP pneumonia, will need 21 day course, this will cover the Eikenella and Stenothrophomonas he had on BAL from 12/23/2021  - Prednisone 20 mg once daily for 11 more days (started on 2/7)  - Pulmonary toilet with chest physiotherapy QID and nebs: Xopenex QID, and Pulmicort BID  - Ventilator and ARDS management per MICU       Aspergillus empyema (left-sided): First noted 10/8/19, negative in November and again on admission.  CT scan on 7/17/20 with increased mass-like density, likely pleural-based, in LLL area s/p needle aspiration (8/13/20) with Aspergillus fumigatus on cultures s/p intrapleural amphotericin bead placement (11/20).  Following with ID as OP.  LFTs stable on admission (ALP chronically mildly elevated).  CT chest with increased loculated left pleural effusion s/p thoracentesis (1/25), exudative with 87% neutrophils, very high LDL (6308), cytology  normal.   - AFB pleural cultures (1/25) NGTD  - PTA posaconazole, PTA plan for indefinite course; level 0.8 on 2/4 (prior level 1/26 therapeutic), changed to IV Posaconazole 2/9/2021 due to subtherapeutic level despite increasing the enteral Posaconazole     S/p bilateral lung transplant for ILD 2/2 CTD:   Right mainstem bronchial stenosis (s/p dilation 3/2019): Last seen in pulmonary clinic 12/2. DSA (1/25) not detected. Continued right mainstem stenosis noted with PTA bronch on 12/23/20, mild on 1/24 bronch.   - Follow up with IP as OP for monitoring of right bronchial stenosis     Immunosuppression: On 2 drug IST d/t recurrent infections  - Tacrolimus 1/1.5mg pm  Goal level 8-10, increased the dose to 1.5 mg BID as the level was 5 2/9, check a level 2/11 and 2/12  - Chronic prednisone dose on hold with above dose steroids (5 mg qAM / 2.5 mg qPM)     Prophylaxis:   - Bactrim treatment dose as above, PTA had not been on PJP ppx since 7/28/20 as CD4 > 200    ICU Delirium: Has a hx of delirium while hospitalized. Manifests with some paranoia.   - Zyprexa per primary team  - Frequent reorientation  - Discussed with  that while distressing our goal is not to sedate her further as some of the meds can make her delirium worse, but to try to verbally deescalate/reorient.      H/o CMV viremia: CMV D+/R+.  CMV <137 (1/25).  - VGCV for CMV ppx with stress dose steroids (1/26)      EBV viremia: Level 12/9/20 mildly elevated to 10K (log 4) from prior 3K (3.5 log) on 9/9/20, repeat (1/25/21) decreased to 5619 copies (log of 3.8).  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 Chronic iHD M/Th via AVF with partial graft.  Not able to tolerate iHD 2/3 d/t hypotension, line placed and transitioned to CRRT 2/4. No back to iHD   - Monitoring of tacrolimus as above  - Dialysis management per nephrology     Atrial fibrillation w/ RVR: H/o paroxysmal AF, last  "10/2019.  Recurrence 1/30 with RVR (-140s), likely in the setting of acute pulmonary illness.  - Management per MICU    Oropharyngeal candidiasis: White tongue plaque noted 2/1.  - Nystatin QID (2/1)      We appreciate the excellent care provided by the MICU team. Recommendations communicated via in person rounding and this note. Will continue to follow along closely, please do not hesitate to call with any questions or concerns.     Discussed and seen with Dr. Henry Braun MD   Pulmonary and Critical Care Fellow   Pager [phone]      Subjective & Interval History:   Patient was sleeping when I saw her, she was sedated with Precedex gtt, she was comfortable, she is requiring some vasopressor support with Norepi, no tachycardia, her vent settings are improving.    Review of Systems:     ROS as above, otherwise severely limited d/t intubation and sedation    Physical Exam:     Vital signs:  Temp: 98.4  F (36.9  C) Temp src: Axillary  Pulse: 93   Resp: (!) 31 SpO2: 90 % O2 Device: Mechanical Ventilator Oxygen Delivery: 60 LPM Height: 160 cm (5' 2.99\") Weight: 55.7 kg (122 lb 12.7 oz)  I/O:           Intake/Output Summary (Last 24 hours) at 2/9/2021 1111  Last data filed at 2/9/2021 1000  Gross per 24 hour   Intake 2655.77 ml   Output 1061 ml   Net 1594.77 ml             Constitutional: Supine  HEENT: Orally intubated.   PULM: Slightly diminished at the bases, otherwise clear to auscultation bilaterally with no crackles or wheezing   CV: Trace pitting edema.   ABD: Soft, nondistended, nontender, active bowel sounds  MSK: Moving extremities.   NEURO: Follows commands   SKIN: Warm, dry. No rash on limited exam.   PSYCH: Calm.       Data:     LABS    CMP:   Recent Labs   Lab 02/10/21  0420 02/09/21  1615 02/09/21  0403 02/08/21  1611 02/08/21  0450 02/08/21  0450 02/07/21  0400 02/07/21  0400    135 137 136   < > 137   < > 137   POTASSIUM 4.4 4.3 4.4 4.5   < > 4.1   < > 4.5   CHLORIDE 102 102 " 105 104   < > 105   < > 106   CO2 27 28 27 29   < > 27   < > 26   ANIONGAP 7 5 5 3   < > 6   < > 6   * 153* 139* 141*   < > 146*   < > 118*   BUN 32* 20 28 21   < > 21   < > 23   CR 1.65* 1.08* 1.38* 0.96   < > 1.06*   < > 1.17*   GFRESTIMATED 34* 56* 42* 65   < > 58*   < > 51*   GFRESTBLACK 39* 65 49* 75   < > 67   < > 59*   CHELSEY 8.6 8.3* 8.8 8.9   < > 8.8   < > 8.4*   MAG 2.0  --  2.3 2.3  --  2.4*   < > 2.6*   PHOS 2.9  --  2.9 2.7  --  2.8   < > 4.0   PROTTOTAL  --  6.1* 5.6*  --   --  6.1*  --  6.0*   ALBUMIN  --  2.0* 1.8*  --   --  2.0*  --  2.0*   BILITOTAL  --  0.4 0.3  --   --  0.4  --  0.4   ALKPHOS  --  218* 182*  --   --  178*  --  166*   AST  --  6 <3  --   --  4  --  3   ALT  --  32 28  --   --  29  --  31    < > = values in this interval not displayed.     CBC:   Recent Labs   Lab 02/10/21  0420 02/09/21  1225 02/09/21  0403 02/08/21  0450   WBC 7.2 11.3* 5.7 9.2   RBC 2.52* 2.90* 2.15* 2.50*   HGB 7.6* 8.8* 6.5* 7.5*   HCT 24.0* 27.5* 20.8* 23.6*   MCV 95 95 97 94   MCH 30.2 30.3 30.2 30.0   MCHC 31.7 32.0 31.3* 31.8   RDW 18.3* 17.4* 17.6* 17.2*   * 90* 83* 101*       INR: No lab results found in last 7 days.    Glucose:   Recent Labs   Lab 02/10/21  0802 02/10/21  0420 02/10/21  0400 02/10/21  0006 02/09/21  2105 02/09/21  1628 02/09/21  1615 02/09/21  1128 02/09/21  0403 02/09/21  0403 02/08/21  1611 02/08/21  1611 02/08/21  0956 02/08/21  0956 02/08/21  0450 02/08/21  0450   GLC  --  198*  --   --   --   --  153*  --   --  139*  --  141*  --  156*  --  146*   *  --  183* 144* 164* 136*  --  181*   < >  --    < >  --    < >  --    < >  --     < > = values in this interval not displayed.       Blood Gas:   Recent Labs   Lab 02/09/21  1225 02/09/21  0403 02/08/21  1200   O2PER 60 60.0 60       Culture Data   Recent Labs   Lab 02/09/21  1657 02/09/21  1649   CULT No growth after 12 hours No growth after 12 hours       Virology Data:   Lab Results   Component Value Date    FLUAH1  Not Detected 01/24/2021    FLUAH3 Not Detected 01/24/2021    TE0889 Not Detected 01/24/2021    IFLUB Not Detected 01/24/2021    RSVA Not Detected 01/24/2021    RSVB Not Detected 01/24/2021    PIV1 Not Detected 01/24/2021    PIV2 Not Detected 01/24/2021    PIV3 Not Detected 01/24/2021    HMPV Not Detected 01/24/2021    HRVS Negative 01/24/2021    ADVBE Negative 01/24/2021    ADVC Negative 01/24/2021    ADVC Negative 12/23/2020    ADVC Negative 10/07/2019       Historical CMV results (last 3 of prior testing):  Lab Results   Component Value Date    CMVQNT <137 (A) 01/25/2021    CMVQNT 238 (A) 01/24/2021    CMVQNT 675 (A) 12/23/2020     Lab Results   Component Value Date    CMVLOG <2.1 01/25/2021    CMVLOG 2.4 (H) 01/24/2021    CMVLOG 2.8 (H) 12/23/2020       Urine Studies    Recent Labs   Lab Test 01/24/21  1729 10/21/19  2240   URINEPH 5.0 5.0   NITRITE Negative Negative   LEUKEST Moderate* Large*   WBCU 34* 115*       Most Recent Breeze Pulmonary Function Testing (FVC/FEV1 only)  FVC-Pre   Date Value Ref Range Status   12/09/2020 1.12 L    09/09/2020 1.56 L    03/09/2020 1.57 L    02/19/2020 1.78 L      FVC-%Pred-Pre   Date Value Ref Range Status   12/09/2020 34 %    09/09/2020 47 %    03/09/2020 48 %    02/19/2020 54 %      FEV1-Pre   Date Value Ref Range Status   12/09/2020 0.98 L    09/09/2020 1.10 L    03/09/2020 0.96 L    02/19/2020 0.99 L      FEV1-%Pred-Pre   Date Value Ref Range Status   12/09/2020 37 %    09/09/2020 42 %    03/09/2020 37 %    02/19/2020 38 %        IMAGING    Recent Results (from the past 48 hour(s))   CT Chest w/o Contrast    Narrative    EXAMINATION: Chest CT  2/4/2021 3:49 AM    CLINICAL HISTORY: Pneumonia, effusion or abscess suspected.    COMPARISON: CT chest 1/27/2021. CT chest 12/9/2020.    TECHNIQUE: CT imaging obtained through the chest without contrast.  Axial, coronal, and sagittal reconstructions and axial MIP reformatted  images are reviewed.     CONTRAST:  None    FINDINGS:  Lines and tubes: Endotracheal tube tip terminates in the low thoracic  trachea. Right internal jugular Central venous catheter tip terminates  in the distal SVC. Enteric tube courses into the stomach.    Lungs: Postsurgical changes of bilateral lung transplant. Fluid-filled  upper trachea. Central tracheobronchial tree is otherwise patent.  Trace bilateral pleural effusions. Diffuse groundglass opacification  with interlobular septal thickening throughout the bilateral lung  fields with large confluent areas of consolidation, most pronounced in  the bilateral lower lobes. Overall the degree of consolidation has  increased from prior exam on 1/27/2021. No significant change in the  peripherally calcified chronic empyema in the left lung base.  Intrapleural amphotericin bead noted in the left lung base.    Mediastinum: The visualized thyroid is unremarkable. Cardiac size is  enlarged. No significant pericardial effusion. Normal caliber of the  aorta and main pulmonary artery. Normal branching pattern of the  aortic arch. No significant coronary artery calcium. Mild  atherosclerotic changes of the thoracic aorta. Scattered prominent  mediastinal lymph nodes, not enlarged by size criteria. Esophagus is  normal in caliber.    Bones and soft tissues: Degenerative changes of the spine. Stable  anterior wedge compression deformity of the T5 vertebral body. Old  right sixth rib fracture. Intact median sternotomy wires. No  suspicious osseous lesions. Mild diffuse anasarca.    Upper Abdomen: Limited evaluation of the upper abdomen is within  normal limits.      Impression    IMPRESSION:   1.  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 Overall the degree  of consolidation has progressed compared to the prior CT on 1/27/2021.  Cannot exclude superimposed infection.  2. No significant change in the chronic empyema in the left lower  lobe  status post intrapleural amphotericin B placement.    I have personally reviewed the examination and initial interpretation  and I agree with the findings.    MEHNAZ CHAPMAN MD

## 2021-02-10 NOTE — PROGRESS NOTES
Care Management - Conference planning    Length of Stay (days): 17    Expected Discharge Date: TBD     Concerns to be Addressed:   Medical update, goals of care    Patient plan of care discussed at interdisciplinary rounds: Yes    Anticipated Discharge Disposition:  LTACH     Anticipated Discharge Services:  Vent weaning and intensive rehab    Education Provided on the Conference Plan:  Provided conference call in information, date and timing to pt's  Ranjan. He will pass along information to other family members.     Family in Agreement with the Plan: Yes     Coordination by RNCC:  I have updated pt's family, MICU, Pulmonary transplant team along with LUANA, bedside RN      Additional Information:  Previous care conference held on 2/05. Pt has had some improvement and MICU/ Pulmonary transplant would like a repeat care conference tomorrow for medical update.     Care conference confirmed for tomorrow, Thursday at 1:00 pm in 4C conference room. Pre-conference for providers will be at 12:45 pm in the conference room.       Wilian Osorio RN, BSN  ICU Care Coordinator  Pager: [phone]  Phone:  [phone]

## 2021-02-10 NOTE — PLAN OF CARE
ICU End of Shift Summary. See flowsheets for vital signs and detailed assessment.    Changes this shift: Dialyzed for 3L, post HD run pt became hypotensive requiring levophed to maintain MAP>60. Left femoral CVC removed.  CT of chest & pelvis done. Precedex drip increased to 1.2 mcg/kg/h, pt appears to be more calm and sleeping     Plan:  Titrate levophed to MAP>60, administering prn sleeping aids as needed and continue w/goals of care.      Problem: Adult Inpatient Plan of Care  Goal: Patient-Specific Goal (Individualized)  Outcome: Declining     Problem: Fluid Imbalance (Pneumonia)  Goal: Fluid Balance  Outcome: Declining

## 2021-02-10 NOTE — PROGRESS NOTES
[facility] CRITICAL CARE STAFF NOTE    Acute Critical Care Issues/Key Findings:     Kecia Blue remains critically ill due to acute hypoxic respiratory failure and ARDS secondary to PJP pneumonia, in the setting of a history of ILD antisynthetase syndrome s/p BSLT on 3/2018 complicated by left mainstem bronchial stenosis, left sided aspergillus empyema, and CMV viremia, as well as HTN, seronegative RA, and ESRD on HD.      1. Acute hypoxic respiratory failure and ARDS secondary to PJP pneumonia: now on third intubation this admission (most recently intubated on 2/3), raising concern for secondary pneumonia as well. Currently on 55% FiO2, PEEP 10, if FiO2 can be weaned to 50%, would then wean PEEP to 8. Following blood gases as needed. Remains on steroids and bactrim for PJP pneumonia. Will likely need 3+ weeks of bactrim (currently day 17). Continue Zosyn as well for possible secondary bacterial pneumonia, clarify duration with pulmonary transplant, currently planning on continuing through 2/12. CT C/A/P without acute changes, slight improvement in lungs noted. Mild stenosis of right anastomosis. Given slow improvement, and third intubation, tracheostomy would be appropriate if desired. Discussed with patient and  today, who seems supportive of this, but will plan to finalize tomorrow with family conference with children as well.   2. History of bilateral sequential lung transplant in 2018 (complicated by left mainstem bronchial stenosis): appreciate pulmonary transplant team assistance. Continue budesonide nebs, xopenex nebs, prednisone 20 mg daily, and tacrolimus. On valganciclovir (started here due to history of CMV and concern for reactivation) and on posaconazole chronically (for history of aspergillus empyema complicating transplant). Metanebs/chest physiotherapy four times daily with levalbuterol nebs as well.   3. ICU sedation, anxiety, delirium: on Precedex drip. Zyprexa, increase  to 10 mg nightly, with PRN. Delirium precautions. Oxycodone PRN. Versed PRN.  4. Shock state: requiring pressors after fluid removal with HD on 2/9, intermittently on and off levophed. Precedex as well as fluid removal is likely contributing. Would start midodrine 5 mg TID, give albumin 25% 25g x1. Follow cultures, no clear signs of worsening infection at this point.   5. Atrial fibrillation with RVR, currently in sinus tachycardia with PACs: A fib is a new problem this hospitalization, though may have had transient episodes in the past. Not on chronic anticoagulation at baseline. Will monitor HR on precedex. If BP stabilizes and a fib continues, may need beta blockade (hold off now)  6. ESRD: typically on HD: Has intact fistula in left forearm. Hypotensive after HD on 2/9. Appreciate nephrology assistance. If tolerating HD via fistula, may be able to remove HD catheter.   7. Thrombocytopenia: Currently stable. May be secondary to treatment dose bactrim.   8. Anemia: Acute drop in Hgb on 2/9, received 1 unit PRBC. No signs/symptoms of acute blood loss. Hgb dropped 1 g again this AM, now 7.6. Monitor BID.    The patient was seen and examined with the resident/fellow/NP team.  We have discussed the patient in detail and I agree with the findings, assessment, and plan as documented in resident/fellow Dr. Quinones's, note from today (February 10, 2021). Please see this note for additional details of physical exam, history, medications and labs.  I agree with assessment and plan as documented in said note, with main points listed above.   The plan was formulated in conjunction with pharmacy, ICU nurses, and respiratory therapist. I have personally reviewed today's vital signs, medications, laboratory and imaging results. I have reviewed all consults that have been ordered and are active for this patient.      Critical Care Time: 45 min.  I spent this time (excluding procedures) personally providing and directing critical  care services at the bedside and on the critical care unit.      Date of Service (when I saw the patient): 02/10/21    Kian Alcantara MD    Department of Medicine, Division of Pulmonary, Allergy, Critical Care, and Sleep Medicine

## 2021-02-10 NOTE — PROGRESS NOTES
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 She was transferred from OSH ICU 1/22 and intubated and requiring vasopressors. Found to have PCP pneumonia.    Changes Today  - HD with 3L removal, post pressor need intermittently, start Midadrine , and albumin 25%x1  - Hgb down to 7.6, recheck CBC at 1400 (consider hemolysis/DIC work up if hgb continues to be low)    -Care conference with family tomorrow around 1pm to discuss goals of care, possible consideration for Trach. Likely will need bronch before trach to assess for her known Left mainstem bronchial stenosis   - CT chest/abd/pelvis 2/9 unchanged  - Pulm: Posaconazole switched back to IV (poor po absorption), follow QTc    Continue Zosyn empiric till 2/12, Prednisone 20mg ( 2/20)  - Zyprexa increased to 10mg at HS  - Continue to lower Fi02 with a goal to go down to 8 on PEEP  - Continue with PRN Fent and Versed. Continue Precedex currently requiring max of 1.2mcg/kg/hr   - Continue treatment with bactrim, prednisone taper  - Continue zosyn empiric treatment till 2/12, will discuss ongoing plan with pulm  -  CT chest 2/10 to assess for worsening ARDS vs fibrosis; slightly improving aeration of the diffuse ground glass opacities in lower lobes. Stable size LLL chronic empyema and no intraabdominal abscess or infection   - Transitioned to iHD 2/9     PLAN:    Neuro:  # Pain and sedation  - Off fentanyl and versed ggts  - Continue with PRN Fent and Versed. Continue  Precedex currently requiring max of 1.2mcg/kg/hr      # Delirium   - On PRN and scheduled Zyprexa, dose  increased to 10mg at HS     #Insomnia  - Melatonin      #Unequal pupils  Patient with alternating fixed and dilated pupils. CTH unremarkable. Neuro exam otherwise unremarkable  - Optho consulted, no acute concerns     Pulmonary:  #Acute Hypoxic Respiratory Failure,  Suspected 2/2 PJP initially. OSH CT 1/23 + bilateral ground glass opacities and consolidative lung infiltrates centrally distributed to the upper lobes and RLL, . Covid-19 negative X3 at OSH. Given acute and severe decompensation, concern for secondary pneumonia. 2/4 CT chest which displayed worsening consolidations consistent with progression of pneumocystis with the possibility of superimposed secondary pneumonia.  - PJP PCR positive, returned on 1/28  - Continue micro work up and management as below  - Flolan stopped this AM  - Started on iHD  - Maintain supine position for now  - CT chest 2/10 to assess for worsening ARDS vs fibrosis; slightly improving aeration of the diffuse ground glass opacities in lower lobes. Stable size LLL chronic empyema and no intraabdominal abscess or infection   - Plan for care conference with family 2/11 to discuss goals of care, possible consideration for Trach. 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  - Positive PJP PCR on 1/28 with positive fungitel    - Continue PTA tacrolimus  - Prednisone taper, holding home dose for now  - Pulmonary lung transplant team consulted and following, appreciate recs     Cardiovascular:  #Shock- resolved  #Hx Hypertension  Patient ith hypotension likely related to septic shock with some contribution of propofol. Last ECHO 10/21/20 with EF 60-65%, normal LV & RV function. Weaned off levo 2/7 then back on 2/9 using small dose.   - Hold PTA coreg and amlodipine    - HD with 3L removal, post pressor need intermittently, started Midadrine , and albumin 25%x1 2/10    # Atrial fibrillation w/ RVR  Patient with pAF with rates into 130-40s. Likely in the setting of acute pulmoary illness. Will monitor with addition of precedex     GI/Nutrition:  # Portal HTN  Liver biopsy positive for congestive hepatopathy. Previous had ascites thought to be r/t elevated right sided heart pressures. Last saw GI on 12/21/20 and patient endorsed that her ascites is no longer present. Etiology unclear.   - NTD     # Nutrition  - NJT placed by rads, continue TF     # Constipation- resolved  Patient reportedly has history of constipation with bactrim use, now stooling  - Scheduled miralax ordered  - PRN dulcolax and senna-doc   - Currently lose stool, per rectal pouch      Renal/Fluids/Electrolytes:  # ESRD on iHD twice weekly (M/Th).   # Anion gap metabolic acidosis r/t ESRD, uremia- Resolved  Outpatient dry body weight 56.5 kg.  - Nephrology following   - CRRT initiated 2/4, goal net negative 0-1 L to achieve dry weight, stopped 2/8, transitioned to iHD 2/9  - I&O  - Daily Weights   - Trend BMP      # Hypokalemia- resolved  - Dialysis as above     Endocrine:  # Seronegative RA with Raynaud's   - NTD, monitor   # Hyperglycemia, steroid induced  - High intensity sliding scale insulin  - 10 units glargine daily     ID:  # Sepsis with Shock  Suspected related to PCP pneumonia. OSH CT 1/23 + bilateral ground glass opacities and consolidative lung infiltrates  centrally distributed to the upper lobes and RLL. Covid-19 negative X3 at OSH. Procalcitonin negative. Urine strep, CMV, and RPP negative. Given acute and severe decompensation, concern for secondary pneumonia. 2/4 CT chest which displayed worsening consolidations consistent with progression of pneumocystis with the possibility of superimposed secondary pneumonia.  - PJP PCR positive, fungitell positive as well  - Continue treatment with bactrim, prednisone taper  - Continue zosyn empiric treatment, plan for 10 days (ending 2/12)  - s/p thora 1/25, fluid exudative, negative w/u  - s/p 7 day course of empiric ceftriaxone      # Chronic/Stable right aspergillus empyema   - Continue posaconazole, treatment dosing, will monitor QTc (509 on 2/8)       Hematology:    # Anemia r/t renal disease, chronic stable   # Thrombocytopenia r/t critical illness  # Leukocytosis, infection/steroids  Takes aranesp 25 mcg every four weeks, last dose 01/14. Next dose 2/11  - Daily CBC     Musculoskeletal:  #Weakness/Deconditioning of critical illness  - PT/OT consulted     Skin:  # Dermatomyositis antitryptase syndrome   - Noted, NTD     General Cares/Prophylaxis:    DVT Prophylaxis: Heparin SQ  GI Prophylaxis: PPI  Restraints: Not indicated  Family Communication: Plan to update  at patient bedside.  Care conference plan for 2/11 ( following on time)   Code Status: Full      Lines/tubes/drains:  - HD fistula left forearm  - CVC RIJ  - PIV  - A-line  - NJT  - Rectal pouch  - ETT     Disposition:  - Medical ICU     Patient seen and findings/plan discussed with medical ICU staff, Dr. Alcantara.    Luis Angel Quinones MD  Internal Medicine, PGY-1  [facility]  Pager: [phone]       INTERVAL HISTORY:   No acute event overnight. Continue to be anxious breathing over the vent. Able to follow commands, denies pain and continues to request for ETT suction, per staffing no secretions.   with patient at the  bedside, all questions answered.   Discussion on goals of care, for possible Trach placement. Patient and  agreeable if patient does not improve. Currently on PEEP of 10 on 55% Fi02. Plan for weaning Fi02 further for a goal to decrease PEEP to 8.     OBJECTIVE     Temp:  [97  F (36.1  C)-98.4  F (36.9  C)] 98.4  F (36.9  C)  Pulse:  [] 112  Resp:  [24-33] 24  MAP:  [49 mmHg-126 mmHg] 93 mmHg  Arterial Line BP: ()/(35-87) 138/57  FiO2 (%):  [55 %-60 %] 60 %  SpO2:  [86 %-100 %] 97 %    Ventilation Mode: CMV/AC  (Continuous Mandatory Ventilation/ Assist Control)  FiO2 (%): 60 %  Rate Set (breaths/minute): 24 breaths/min  Tidal Volume Set (mL): 320 mL  PEEP (cm H2O): 10 cmH2O  Oxygen Concentration (%): 60 %  Resp: 24      Physical Exam  GEN: Laying in bed, anxious looking, intubated and alert on precedex gtts    NEURO: On sedation, eyes open and following commands.   RESP: Diffused coarse breath sounds , no wheezing this AM  CV: Pulses intact, tachy regular with frequent PVC's  ABD: Soft, NT, ND, BS+, loose stool via rectal pouch in place  EXT: wwp  SKIN: No erythema, Left AV fistula with thrill and bruit    I/O last 3 completed shifts:  In: 3048.36 [I.V.:1203.36; NG/GT:465]  Out: 3450 [Other:3000; Stool:450]    Vitals:    02/07/21 0000 02/08/21 0400 02/09/21 0400   Weight: 58.1 kg (128 lb 1.4 oz) 57.8 kg (127 lb 6.8 oz) 55.7 kg (122 lb 12.7 oz)       ABG / VBG:   Recent Labs   Lab Test 02/09/21  1225 02/09/21  0403 02/08/21  1200 01/31/21  0441 01/31/21  0441 01/29/21  0416 01/29/21  0416   PH 7.38 7.42 7.40   < >  --    < >  --    PCO2 49* 44 47*   < >  --    < >  --    PO2 74* 116* 72*   < >  --    < >  --    O2PER 60 60.0 60   < > 70.0   < > 50.0   PHV  --   --   --   --  7.43  --  7.54*   PCO2V  --   --   --   --  41  --  35*    < > = values in this interval not displayed.       BMP:   Recent Labs   Lab Test 02/10/21  0420 02/09/21  1615 02/09/21  0403 02/08/21  1611 01/24/21  1529  01/24/21  1529 10/21/19  1459 10/21/19  1459    135 137 136   < >  --    < >  --    POTASSIUM 4.4 4.3 4.4 4.5   < >  --    < >  --    CHLORIDE 102 102 105 104   < >  --    < >  --    CO2 27 28 27 29   < >  --    < >  --    ANIONGAP 7 5 5 3   < >  --    < >  --    LACT  --  0.5*  --   --   --  0.7  --  0.5*   BUN 32* 20 28 21   < >  --    < >  --    CR 1.65* 1.08* 1.38* 0.96   < >  --    < >  --    * 153* 139* 141*   < >  --    < >  --    CHELSEY 8.6 8.3* 8.8 8.9   < >  --    < >  --    MAG 2.0  --  2.3 2.3   < >  --    < >  --    PHOS 2.9  --  2.9 2.7   < >  --    < >  --     < > = values in this interval not displayed.       Hepatic Studies:   Recent Labs   Lab Test 02/09/21  1615 02/09/21  0403 02/08/21  0450   AST 6 <3 4   ALT 32 28 29   ALKPHOS 218* 182* 178*   BILITOTAL 0.4 0.3 0.4   ALBUMIN 2.0* 1.8* 2.0*       Heme Studies:   Recent Labs   Lab Test 02/10/21  0420 02/09/21  1225 02/09/21  0403 12/23/20  0838 12/23/20  0838 11/20/20  0820 11/20/20  0820 09/09/20  0822 08/13/20  1020 08/13/20  1020 03/10/20  0954   WBC 7.2 11.3* 5.7   < > 5.5   < > 6.3 13.1*   < >  --  6.3   ANEU  --   --   --   --   --   --   --  10.7*  --   --  5.0   ALYM  --   --   --   --   --   --   --  0.9  --   --  0.5*   AEOS  --   --   --   --   --   --   --  0.2  --   --  0.1   HGB 7.6* 8.8* 6.5*   < > 12.4   < > 10.3* 11.9   < >  --  10.3*   MCV 95 95 97   < > 97   < > 97 99   < >  --  100   * 90* 83*   < > 149*   < > 161 261   < >  --  113*   INR  --   --   --   --  1.02  --  0.94  --   --  1.06  --     < > = values in this interval not displayed.       Iron Studies:   Recent Labs   Lab Test 02/03/21  0415 12/13/18  1033 08/01/18  0921 05/08/18  0709   IRON 51 16* 93 48   * 221* 248 275   IRONSAT 36 7* 37 18   YOLA  --  302* 571*  --        Urine Studies:   Recent Labs   Lab Test 01/24/21  1729 10/21/19  2240   SG 1.023 1.018   URINEPH 5.0 5.0   NITRITE Negative Negative   LEUKEST Moderate* Large*   WBCU 34*  115*   RBCU 26* 25*   PROTEIN 30* 30*       Microbiology:  No results found for: RS, FLUAG    Recent Labs   Lab Test 02/09/21  1657 02/09/21  1649 02/03/21  0941 02/03/21  0924 02/03/21  0900 02/03/21  0816 01/25/21  1347 01/24/21  1841   CULT No growth after 7 hours No growth after 7 hours No growth  --  No growth No growth Culture negative after 2 weeks  No anaerobes isolated  No growth  Culture received and in progress.  Positive AFB results are called as soon as detected.    Final report to follow in 7 to 8 weeks.    Assayed at [facility] [address] [place] [address] [phone] No growth   SDES Blood Right Arm Blood Right Hand Sputum  Sputum Nares Blood PICC Blood Right Hand Pleural fluid  Pleural fluid  Pleural fluid  Pleural fluid  Pleural fluid  Pleural fluid Blood Right Hand       Imaging:  Recent Results (from the past 24 hour(s))   XR Chest Port 1 View    Narrative    EXAM: XR CHEST PORT 1 VW  2/9/2021 9:24 AM     HISTORY:  worsening hypoxia       COMPARISON:  CT chest to 4/20/2021    FINDINGS:   Semiupright portable AP view of the chest. ET tube tip projects over  the thoracic trachea. Right IJ central venous catheter tip projects  over the superior cavoatrial junction, stable. Feeding tube tip  terminates out of the field of view postsurgical changes at bilateral  lung transplant with mediastinal sternotomy wires intact. Trachea is  midline. Cardiac silhouette is largely obscured. No significant change  in diffuse mixed interstitial and airspace opacities, more pronounced  in the bilateral lower lobes. Likely bilateral pleural effusions. No  appreciated pneumothorax.      Impression    IMPRESSION:     1. No significant change in diffuse mixed interstitial and airspace  opacities and likely bilateral pleural effusions, findings compatible  with ARDS.  2. Stable position of support devices.    I have personally reviewed the examination and initial interpretation  and I agree  with the findings.    ADAM GONZALEZ MD

## 2021-02-10 NOTE — PROGRESS NOTES
Nephrology Progress Note  02/10/2021         Attestation signed by Morgan Swanson MD at 2/10/2021 3:49 PM   Physician Attestation   I, Morgan Swanson, saw and evaluated Kecia Blue as part of a shared visit.  I have reviewed and discussed with the advanced practice provider their history, physical and plan.     I personally reviewed the vital signs, medications, labs, and imaging.     My key history or physical exam findings: HD yesterday, though pressors needed (now weaned off it appears per listed vitals). BMP reflects clearance delivered.      Key management decisions made by me: ESRD patient: next HD run tomorrow - despite running 2X weekly outpatient (likely due to high degree of residual renal fxn), will more likely run 3X weekly inpt due to IV fluids as increased ins.     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 Nephrology consutled for dialysis needs.     Interval History :   Mrs Blue had HD yesterday, pulled 3L and has been on low dose pressor since.  Will plan for ~2L tomorrow and see if this helps hemodynamics, has difficult balance as we are trying to treat resp failure with aggressive fluid removal but also avoiding pressors.  Still on 50% on vent, may be progressing towards needing trach to help rehab.       Assessment & Recommendations:   ESRD-Runs at [place] through [facility] group.  Has atypical HD schedule of Monday and Thursday, has AVF with partial graft which has been challenging to access per RN's.  Needed CRRT 2/5-2/8 due to hemodynamic instability  "but otherwise has run iHD.  Working on getting to stable 3x/week schedule, may be progressing to trach for resp failure.                   -holding on run today, will plan to run tomorrow, a bit less UF planned tomorrow.                -Has L arm AVF/graft, somewhat challenging access.                -Removed temp HD line on 2/9..        Volume status-Ran iHD yesterday, pulled 3L and may have caused hypovolemia as she has needed low dose norepi since, will try to pull a bit less tomorrow and see if hemodynamics tolerate a bit better post run.       Electrolytes/pH-K 4.4, bicarb 27, no acute issues but starting CRRT for volume.       Ca/phos/pth-Ca 8.6, Mg and Phos mildly up last check.      Anemia-Hgb 7.6, down from 10 in past 2 days, acute management per team.  Checked iron stores and sats 36% on 2/3.     Nutrition-Nutren TF.       Seen and discussed with Dr Swanson     Recommendations were communicated to primary team via verbal communication.      ADRIÁN Lo CNS  Clinical Nurse Specialist  [phone]    Review of Systems:   I reviewed the following systems:  ROS not done due to vent/sedation.     Physical Exam:   I/O last 3 completed shifts:  In: 3048.36 [I.V.:1203.36; NG/GT:465]  Out: 3450 [Other:3000; Stool:450]   BP (!) 147/64 (BP Location: Right leg)   Pulse 93   Temp 98.4  F (36.9  C) (Axillary)   Resp (!) 31   Ht 1.6 m (5' 2.99\")   Wt 55.7 kg (122 lb 12.7 oz)   LMP 06/07/2014 (Exact Date)   SpO2 90%   BMI 21.76 kg/m       GENERAL APPEARANCE: Re-intubated, proned.   EYES: no scleral icterus  Endo: no moon facies  Pulmonary: Intubated, proned, equal expansion.    CV: regular rhythm, normal rate - Edema present  GI: soft, non distended  MS: no evidence of inflammation in joints, no muscle tenderness  :  Basilio present  SKIN: warm, dry, +1 edema.    NEURO: intubated and sedated.      Labs:   All labs reviewed by me  Electrolytes/Renal -   Recent Labs   Lab Test 02/10/21  0420 02/09/21  1615 " 02/09/21  0403 02/08/21  1611    135 137 136   POTASSIUM 4.4 4.3 4.4 4.5   CHLORIDE 102 102 105 104   CO2 27 28 27 29   BUN 32* 20 28 21   CR 1.65* 1.08* 1.38* 0.96   * 153* 139* 141*   CHELSEY 8.6 8.3* 8.8 8.9   MAG 2.0  --  2.3 2.3   PHOS 2.9  --  2.9 2.7       CBC -   Recent Labs   Lab Test 02/10/21  0420 02/09/21  1225 02/09/21  0403   WBC 7.2 11.3* 5.7   HGB 7.6* 8.8* 6.5*   * 90* 83*       LFTs -   Recent Labs   Lab Test 02/09/21  1615 02/09/21  0403 02/08/21  0450   ALKPHOS 218* 182* 178*   BILITOTAL 0.4 0.3 0.4   ALT 32 28 29   AST 6 <3 4   PROTTOTAL 6.1* 5.6* 6.1*   ALBUMIN 2.0* 1.8* 2.0*       Iron Panel -   Recent Labs   Lab Test 02/03/21  0415 12/13/18  1033 08/01/18  0921   IRON 51 16* 93   IRONSAT 36 7* 37   YOLA  --  302* 571*           Current Medications:    sodium chloride 0.9%  500 mL Intravenous Once     albumin human  25 g Intravenous Once     B and C vitamin Complex with folic acid  5 mL Oral or Feeding Tube Daily     budesonide  0.5 mg Nebulization BID     heparin ANTICOAGULANT  5,000 Units Subcutaneous Q8H     insulin aspart  1-12 Units Subcutaneous Q4H     insulin glargine  10 Units Subcutaneous QAM AC     levalbuterol  1.25 mg Nebulization 4x Daily     midodrine  5 mg Oral or Feeding Tube TID     nystatin  1,000,000 Units Mouth/Throat 4x Daily     OLANZapine  10 mg Oral or Feeding Tube Q24H     pantoprazole  40 mg Oral QAM AC     piperacillin-tazobactam  2.25 g Intravenous Q6H     polyethylene glycol  17 g Oral or Feeding Tube BID     posaconazole (NOXAFIL) intermittent infusion  300 mg Intravenous Daily     [Held by provider] predniSONE  2.5 mg Oral or Feeding Tube QPM     predniSONE  20 mg Oral Daily     [Held by provider] predniSONE  5 mg Oral or Feeding Tube QAM     sulfamethoxazole-trimethoprim  295 mg Oral or Feeding Tube BID     tacrolimus  1.5 mg Oral or Feeding Tube Q24H     tacrolimus  1.5 mg Oral or Feeding Tube Q24H     valGANciclovir  450 mg Oral Once per day on  [facility]       dexmedetomidine 1.2 mcg/kg/hr (02/10/21 1100)     dextrose       norepinephrine 0.02 mcg/kg/min (02/10/21 1100)     sodium chloride 10 mL/hr at 02/08/21 0400

## 2021-02-11 LAB
ABO + RH BLD: NORMAL
ABO + RH BLD: NORMAL
ANION GAP SERPL CALCULATED.3IONS-SCNC: 7 MMOL/L (ref 3–14)
BASOPHILS # BLD AUTO: 0 10E9/L (ref 0–0.2)
BASOPHILS NFR BLD AUTO: 0.4 %
BLD GP AB SCN SERPL QL: NORMAL
BLD PROD TYP BPU: NORMAL
BLD PROD TYP BPU: NORMAL
BLD UNIT ID BPU: 0
BLOOD BANK CMNT PATIENT-IMP: NORMAL
BLOOD PRODUCT CODE: NORMAL
BPU ID: NORMAL
BRONCHOSCOPY: NORMAL
BUN SERPL-MCNC: 52 MG/DL (ref 7–30)
CALCIUM SERPL-MCNC: 8.4 MG/DL (ref 8.5–10.1)
CHLORIDE SERPL-SCNC: 103 MMOL/L (ref 94–109)
CO2 SERPL-SCNC: 25 MMOL/L (ref 20–32)
CREAT SERPL-MCNC: 2.53 MG/DL (ref 0.52–1.04)
D DIMER PPP FEU-MCNC: 2 UG/ML FEU (ref 0–0.5)
DIFFERENTIAL METHOD BLD: ABNORMAL
EOSINOPHIL # BLD AUTO: 0.2 10E9/L (ref 0–0.7)
EOSINOPHIL NFR BLD AUTO: 3.3 %
ERYTHROCYTE [DISTWIDTH] IN BLOOD BY AUTOMATED COUNT: 18.6 % (ref 10–15)
ERYTHROCYTE [DISTWIDTH] IN BLOOD BY AUTOMATED COUNT: 18.8 % (ref 10–15)
FIBRINOGEN PPP-MCNC: 358 MG/DL (ref 200–420)
GFR SERPL CREATININE-BSD FRML MDRD: 20 ML/MIN/{1.73_M2}
GLUCOSE BLDC GLUCOMTR-MCNC: 144 MG/DL (ref 70–99)
GLUCOSE BLDC GLUCOMTR-MCNC: 163 MG/DL (ref 70–99)
GLUCOSE BLDC GLUCOMTR-MCNC: 169 MG/DL (ref 70–99)
GLUCOSE BLDC GLUCOMTR-MCNC: 173 MG/DL (ref 70–99)
GLUCOSE BLDC GLUCOMTR-MCNC: 185 MG/DL (ref 70–99)
GLUCOSE SERPL-MCNC: 181 MG/DL (ref 70–99)
HCT VFR BLD AUTO: 19.5 % (ref 35–47)
HCT VFR BLD AUTO: 22.6 % (ref 35–47)
HGB BLD-MCNC: 6.1 G/DL (ref 11.7–15.7)
HGB BLD-MCNC: 7.1 G/DL (ref 11.7–15.7)
IMM GRANULOCYTES # BLD: 0 10E9/L (ref 0–0.4)
IMM GRANULOCYTES NFR BLD: 0.8 %
INR PPP: 1.07 (ref 0.86–1.14)
LYMPHOCYTES # BLD AUTO: 0.6 10E9/L (ref 0.8–5.3)
LYMPHOCYTES NFR BLD AUTO: 10.8 %
MAGNESIUM SERPL-MCNC: 2.1 MG/DL (ref 1.6–2.3)
MCH RBC QN AUTO: 28.7 PG (ref 26.5–33)
MCH RBC QN AUTO: 29.9 PG (ref 26.5–33)
MCHC RBC AUTO-ENTMCNC: 31.3 G/DL (ref 31.5–36.5)
MCHC RBC AUTO-ENTMCNC: 31.4 G/DL (ref 31.5–36.5)
MCV RBC AUTO: 92 FL (ref 78–100)
MCV RBC AUTO: 96 FL (ref 78–100)
MONOCYTES # BLD AUTO: 0.4 10E9/L (ref 0–1.3)
MONOCYTES NFR BLD AUTO: 7.7 %
NEUTROPHILS # BLD AUTO: 4 10E9/L (ref 1.6–8.3)
NEUTROPHILS NFR BLD AUTO: 77 %
NRBC # BLD AUTO: 0 10*3/UL
NRBC BLD AUTO-RTO: 0 /100
NUM BPU REQUESTED: 2
PHOSPHATE SERPL-MCNC: 4.1 MG/DL (ref 2.5–4.5)
PLATELET # BLD AUTO: 79 10E9/L (ref 150–450)
PLATELET # BLD AUTO: 83 10E9/L (ref 150–450)
POTASSIUM SERPL-SCNC: 5 MMOL/L (ref 3.4–5.3)
RBC # BLD AUTO: 2.04 10E12/L (ref 3.8–5.2)
RBC # BLD AUTO: 2.47 10E12/L (ref 3.8–5.2)
RETICS # AUTO: 72.4 10E9/L (ref 25–95)
RETICS/RBC NFR AUTO: 2.9 % (ref 0.5–2)
SODIUM SERPL-SCNC: 136 MMOL/L (ref 133–144)
SPECIMEN EXP DATE BLD: NORMAL
TACROLIMUS BLD-MCNC: 3.8 UG/L (ref 5–15)
TME LAST DOSE: ABNORMAL H
TRANSFUSION STATUS PATIENT QL: NORMAL
TRANSFUSION STATUS PATIENT QL: NORMAL
WBC # BLD AUTO: 3.9 10E9/L (ref 4–11)
WBC # BLD AUTO: 5.2 10E9/L (ref 4–11)

## 2021-02-11 PROCEDURE — 94640 AIRWAY INHALATION TREATMENT: CPT | Mod: 76

## 2021-02-11 PROCEDURE — 93005 ELECTROCARDIOGRAM TRACING: CPT

## 2021-02-11 PROCEDURE — 250N000012 HC RX MED GY IP 250 OP 636 PS 637: Performed by: INTERNAL MEDICINE

## 2021-02-11 PROCEDURE — 85025 COMPLETE CBC W/AUTO DIFF WBC: CPT | Performed by: STUDENT IN AN ORGANIZED HEALTH CARE EDUCATION/TRAINING PROGRAM

## 2021-02-11 PROCEDURE — 85610 PROTHROMBIN TIME: CPT | Performed by: STUDENT IN AN ORGANIZED HEALTH CARE EDUCATION/TRAINING PROGRAM

## 2021-02-11 PROCEDURE — 250N000009 HC RX 250: Performed by: NURSE PRACTITIONER

## 2021-02-11 PROCEDURE — 250N000011 HC RX IP 250 OP 636: Performed by: NURSE PRACTITIONER

## 2021-02-11 PROCEDURE — 200N000002 HC R&B ICU UMMC

## 2021-02-11 PROCEDURE — 94668 MNPJ CHEST WALL SBSQ: CPT

## 2021-02-11 PROCEDURE — 250N000013 HC RX MED GY IP 250 OP 250 PS 637: Performed by: INTERNAL MEDICINE

## 2021-02-11 PROCEDURE — 250N000011 HC RX IP 250 OP 636: Performed by: CLINICAL NURSE SPECIALIST

## 2021-02-11 PROCEDURE — 94640 AIRWAY INHALATION TREATMENT: CPT

## 2021-02-11 PROCEDURE — 999N001017 HC STATISTIC GLUCOSE BY METER IP

## 2021-02-11 PROCEDURE — 250N000009 HC RX 250

## 2021-02-11 PROCEDURE — 85045 AUTOMATED RETICULOCYTE COUNT: CPT | Performed by: STUDENT IN AN ORGANIZED HEALTH CARE EDUCATION/TRAINING PROGRAM

## 2021-02-11 PROCEDURE — 99291 CRITICAL CARE FIRST HOUR: CPT | Mod: GC | Performed by: INTERNAL MEDICINE

## 2021-02-11 PROCEDURE — 80048 BASIC METABOLIC PNL TOTAL CA: CPT | Performed by: STUDENT IN AN ORGANIZED HEALTH CARE EDUCATION/TRAINING PROGRAM

## 2021-02-11 PROCEDURE — 99223 1ST HOSP IP/OBS HIGH 75: CPT | Mod: GC | Performed by: INTERNAL MEDICINE

## 2021-02-11 PROCEDURE — 85379 FIBRIN DEGRADATION QUANT: CPT | Performed by: STUDENT IN AN ORGANIZED HEALTH CARE EDUCATION/TRAINING PROGRAM

## 2021-02-11 PROCEDURE — 94667 MNPJ CHEST WALL 1ST: CPT

## 2021-02-11 PROCEDURE — 250N000011 HC RX IP 250 OP 636

## 2021-02-11 PROCEDURE — 250N000013 HC RX MED GY IP 250 OP 250 PS 637: Performed by: NURSE PRACTITIONER

## 2021-02-11 PROCEDURE — P9016 RBC LEUKOCYTES REDUCED: HCPCS | Performed by: INTERNAL MEDICINE

## 2021-02-11 PROCEDURE — 250N000009 HC RX 250: Performed by: CLINICAL NURSE SPECIALIST

## 2021-02-11 PROCEDURE — 999N001086 HC STATISTIC MORPHOLOGY W/INTERP HEMEPATH TC 85060: Performed by: STUDENT IN AN ORGANIZED HEALTH CARE EDUCATION/TRAINING PROGRAM

## 2021-02-11 PROCEDURE — 85384 FIBRINOGEN ACTIVITY: CPT | Performed by: STUDENT IN AN ORGANIZED HEALTH CARE EDUCATION/TRAINING PROGRAM

## 2021-02-11 PROCEDURE — 90937 HEMODIALYSIS REPEATED EVAL: CPT

## 2021-02-11 PROCEDURE — 99292 CRITICAL CARE ADDL 30 MIN: CPT | Mod: GC | Performed by: INTERNAL MEDICINE

## 2021-02-11 PROCEDURE — P9041 ALBUMIN (HUMAN),5%, 50ML: HCPCS | Performed by: CLINICAL NURSE SPECIALIST

## 2021-02-11 PROCEDURE — 999N000157 HC STATISTIC RCP TIME EA 10 MIN

## 2021-02-11 PROCEDURE — 999N000015 HC STATISTIC ARTERIAL MONITORING DAILY

## 2021-02-11 PROCEDURE — 85027 COMPLETE CBC AUTOMATED: CPT | Performed by: STUDENT IN AN ORGANIZED HEALTH CARE EDUCATION/TRAINING PROGRAM

## 2021-02-11 PROCEDURE — 99233 SBSQ HOSP IP/OBS HIGH 50: CPT | Performed by: STUDENT IN AN ORGANIZED HEALTH CARE EDUCATION/TRAINING PROGRAM

## 2021-02-11 PROCEDURE — 258N000003 HC RX IP 258 OP 636: Performed by: CLINICAL NURSE SPECIALIST

## 2021-02-11 PROCEDURE — 250N000013 HC RX MED GY IP 250 OP 250 PS 637: Performed by: STUDENT IN AN ORGANIZED HEALTH CARE EDUCATION/TRAINING PROGRAM

## 2021-02-11 PROCEDURE — 258N000003 HC RX IP 258 OP 636: Performed by: NURSE PRACTITIONER

## 2021-02-11 PROCEDURE — 99233 SBSQ HOSP IP/OBS HIGH 50: CPT | Mod: GC | Performed by: INTERNAL MEDICINE

## 2021-02-11 PROCEDURE — 94003 VENT MGMT INPAT SUBQ DAY: CPT

## 2021-02-11 PROCEDURE — 250N000011 HC RX IP 250 OP 636: Performed by: STUDENT IN AN ORGANIZED HEALTH CARE EDUCATION/TRAINING PROGRAM

## 2021-02-11 PROCEDURE — 93010 ELECTROCARDIOGRAM REPORT: CPT | Performed by: INTERNAL MEDICINE

## 2021-02-11 PROCEDURE — 83735 ASSAY OF MAGNESIUM: CPT | Performed by: STUDENT IN AN ORGANIZED HEALTH CARE EDUCATION/TRAINING PROGRAM

## 2021-02-11 PROCEDURE — 84100 ASSAY OF PHOSPHORUS: CPT | Performed by: STUDENT IN AN ORGANIZED HEALTH CARE EDUCATION/TRAINING PROGRAM

## 2021-02-11 PROCEDURE — 31622 DX BRONCHOSCOPE/WASH: CPT

## 2021-02-11 PROCEDURE — 85060 BLOOD SMEAR INTERPRETATION: CPT | Performed by: PATHOLOGY

## 2021-02-11 PROCEDURE — 250N000011 HC RX IP 250 OP 636: Performed by: INTERNAL MEDICINE

## 2021-02-11 PROCEDURE — 999N000054 HC STATISTIC EKG NON-CHARGEABLE

## 2021-02-11 PROCEDURE — 258N000003 HC RX IP 258 OP 636: Performed by: STUDENT IN AN ORGANIZED HEALTH CARE EDUCATION/TRAINING PROGRAM

## 2021-02-11 PROCEDURE — 250N000009 HC RX 250: Performed by: STUDENT IN AN ORGANIZED HEALTH CARE EDUCATION/TRAINING PROGRAM

## 2021-02-11 PROCEDURE — 80197 ASSAY OF TACROLIMUS: CPT | Performed by: INTERNAL MEDICINE

## 2021-02-11 RX ORDER — FENTANYL CITRATE 50 UG/ML
50 INJECTION, SOLUTION INTRAMUSCULAR; INTRAVENOUS
Status: COMPLETED | OUTPATIENT
Start: 2021-02-11 | End: 2021-02-11

## 2021-02-11 RX ORDER — HEPARIN SODIUM 1000 [USP'U]/ML
500 INJECTION, SOLUTION INTRAVENOUS; SUBCUTANEOUS CONTINUOUS
Status: DISCONTINUED | OUTPATIENT
Start: 2021-02-11 | End: 2021-02-11

## 2021-02-11 RX ORDER — FENTANYL CITRATE 50 UG/ML
INJECTION, SOLUTION INTRAMUSCULAR; INTRAVENOUS
Status: COMPLETED
Start: 2021-02-11 | End: 2021-02-11

## 2021-02-11 RX ORDER — ACETYLCYSTEINE 200 MG/ML
2 SOLUTION ORAL; RESPIRATORY (INHALATION) 2 TIMES DAILY
Status: DISCONTINUED | OUTPATIENT
Start: 2021-02-11 | End: 2021-02-25

## 2021-02-11 RX ORDER — DEXTROSE MONOHYDRATE 25 G/50ML
25-50 INJECTION, SOLUTION INTRAVENOUS
Status: DISCONTINUED | OUTPATIENT
Start: 2021-02-11 | End: 2021-02-16 | Stop reason: HOSPADM

## 2021-02-11 RX ORDER — HEPARIN SODIUM 1000 [USP'U]/ML
500 INJECTION, SOLUTION INTRAVENOUS; SUBCUTANEOUS
Status: COMPLETED | OUTPATIENT
Start: 2021-02-11 | End: 2021-02-11

## 2021-02-11 RX ORDER — SODIUM CHLORIDE FOR INHALATION 0.9 %
3 VIAL, NEBULIZER (ML) INHALATION 2 TIMES DAILY
Status: DISCONTINUED | OUTPATIENT
Start: 2021-02-11 | End: 2021-02-22

## 2021-02-11 RX ORDER — CEFAZOLIN SODIUM 2 G/100ML
2 INJECTION, SOLUTION INTRAVENOUS
Status: DISCONTINUED | OUTPATIENT
Start: 2021-02-11 | End: 2021-02-14

## 2021-02-11 RX ORDER — FENTANYL CITRATE 50 UG/ML
50 INJECTION, SOLUTION INTRAMUSCULAR; INTRAVENOUS EVERY 5 MIN PRN
Status: ACTIVE | OUTPATIENT
Start: 2021-02-11 | End: 2021-02-12

## 2021-02-11 RX ORDER — ALBUTEROL SULFATE 0.83 MG/ML
2.5 SOLUTION RESPIRATORY (INHALATION)
Status: DISCONTINUED | OUTPATIENT
Start: 2021-02-11 | End: 2021-02-22

## 2021-02-11 RX ORDER — NICOTINE POLACRILEX 4 MG
15-30 LOZENGE BUCCAL
Status: DISCONTINUED | OUTPATIENT
Start: 2021-02-11 | End: 2021-02-16 | Stop reason: HOSPADM

## 2021-02-11 RX ORDER — FENTANYL CITRATE 50 UG/ML
150 INJECTION, SOLUTION INTRAMUSCULAR; INTRAVENOUS ONCE
Status: COMPLETED | OUTPATIENT
Start: 2021-02-11 | End: 2021-02-11

## 2021-02-11 RX ORDER — ALBUMIN, HUMAN INJ 5% 5 %
250 SOLUTION INTRAVENOUS
Status: COMPLETED | OUTPATIENT
Start: 2021-02-11 | End: 2021-02-11

## 2021-02-11 RX ORDER — LIDOCAINE HYDROCHLORIDE 10 MG/ML
INJECTION, SOLUTION EPIDURAL; INFILTRATION; INTRACAUDAL; PERINEURAL
Status: COMPLETED
Start: 2021-02-11 | End: 2021-02-11

## 2021-02-11 RX ORDER — AMINO AC/PROTEIN HYDR/WHEY PRO 10G-100/30
1 LIQUID (ML) ORAL 2 TIMES DAILY
Status: DISCONTINUED | OUTPATIENT
Start: 2021-02-11 | End: 2021-02-24

## 2021-02-11 RX ADMIN — NYSTATIN 1000000 UNITS: 500000 SUSPENSION ORAL at 21:22

## 2021-02-11 RX ADMIN — Medication 5 ML: at 09:18

## 2021-02-11 RX ADMIN — ALBUTEROL SULFATE 2.5 MG: 2.5 SOLUTION RESPIRATORY (INHALATION) at 15:00

## 2021-02-11 RX ADMIN — MIDAZOLAM 1 MG: 1 INJECTION INTRAMUSCULAR; INTRAVENOUS at 21:12

## 2021-02-11 RX ADMIN — Medication 40 MG: at 09:20

## 2021-02-11 RX ADMIN — HEPARIN SODIUM 500 UNITS/HR: 1000 INJECTION INTRAVENOUS; SUBCUTANEOUS at 11:45

## 2021-02-11 RX ADMIN — INSULIN ASPART 2 UNITS: 100 INJECTION, SOLUTION INTRAVENOUS; SUBCUTANEOUS at 09:25

## 2021-02-11 RX ADMIN — VALGANCICLOVIR HYDROCHLORIDE 450 MG: 50 POWDER, FOR SOLUTION ORAL at 20:48

## 2021-02-11 RX ADMIN — MIDAZOLAM 1 MG: 1 INJECTION INTRAMUSCULAR; INTRAVENOUS at 12:27

## 2021-02-11 RX ADMIN — DEXMEDETOMIDINE 1.2 MCG/KG/HR: 100 INJECTION, SOLUTION, CONCENTRATE INTRAVENOUS at 20:45

## 2021-02-11 RX ADMIN — DEXMEDETOMIDINE 1.2 MCG/KG/HR: 100 INJECTION, SOLUTION, CONCENTRATE INTRAVENOUS at 06:15

## 2021-02-11 RX ADMIN — MIDAZOLAM 1 MG: 1 INJECTION INTRAMUSCULAR; INTRAVENOUS at 05:21

## 2021-02-11 RX ADMIN — DEXMEDETOMIDINE 1.2 MCG/KG/HR: 100 INJECTION, SOLUTION, CONCENTRATE INTRAVENOUS at 13:33

## 2021-02-11 RX ADMIN — BUDESONIDE 0.5 MG: 0.5 INHALANT ORAL at 08:16

## 2021-02-11 RX ADMIN — SODIUM CHLORIDE 300 MG: 9 INJECTION, SOLUTION INTRAVENOUS at 09:31

## 2021-02-11 RX ADMIN — BUDESONIDE 0.5 MG: 0.5 INHALANT ORAL at 20:20

## 2021-02-11 RX ADMIN — INSULIN ASPART 2 UNITS: 100 INJECTION, SOLUTION INTRAVENOUS; SUBCUTANEOUS at 16:39

## 2021-02-11 RX ADMIN — ALBUTEROL SULFATE 2.5 MG: 2.5 SOLUTION RESPIRATORY (INHALATION) at 20:20

## 2021-02-11 RX ADMIN — ALBUTEROL SULFATE 2.5 MG: 2.5 SOLUTION RESPIRATORY (INHALATION) at 12:01

## 2021-02-11 RX ADMIN — FENTANYL CITRATE 150 MCG: 50 INJECTION, SOLUTION INTRAMUSCULAR; INTRAVENOUS at 10:00

## 2021-02-11 RX ADMIN — SULFAMETHOXAZOLE AND TRIMETHOPRIM 295 MG: 200; 40 SUSPENSION ORAL at 20:48

## 2021-02-11 RX ADMIN — SODIUM CHLORIDE 300 ML: 9 INJECTION, SOLUTION INTRAVENOUS at 11:40

## 2021-02-11 RX ADMIN — INSULIN ASPART 1 UNITS: 100 INJECTION, SOLUTION INTRAVENOUS; SUBCUTANEOUS at 11:42

## 2021-02-11 RX ADMIN — MIDODRINE HYDROCHLORIDE 5 MG: 5 TABLET ORAL at 09:18

## 2021-02-11 RX ADMIN — PIPERACILLIN AND TAZOBACTAM 2.25 G: 2; .25 INJECTION, POWDER, FOR SOLUTION INTRAVENOUS at 20:47

## 2021-02-11 RX ADMIN — PIPERACILLIN AND TAZOBACTAM 2.25 G: 2; .25 INJECTION, POWDER, FOR SOLUTION INTRAVENOUS at 09:45

## 2021-02-11 RX ADMIN — LIDOCAINE HYDROCHLORIDE 0.5 ML: 10 INJECTION, SOLUTION EPIDURAL; INFILTRATION; INTRACAUDAL; PERINEURAL at 11:42

## 2021-02-11 RX ADMIN — Medication 1 PACKET: at 21:20

## 2021-02-11 RX ADMIN — LIDOCAINE HYDROCHLORIDE 50 MG: 10 INJECTION, SOLUTION EPIDURAL; INFILTRATION; INTRACAUDAL; PERINEURAL at 09:58

## 2021-02-11 RX ADMIN — ACETYLCYSTEINE 2 ML: 200 SOLUTION ORAL; RESPIRATORY (INHALATION) at 12:01

## 2021-02-11 RX ADMIN — INSULIN ASPART 2 UNITS: 100 INJECTION, SOLUTION INTRAVENOUS; SUBCUTANEOUS at 04:07

## 2021-02-11 RX ADMIN — HEPARIN SODIUM 5000 UNITS: 5000 INJECTION, SOLUTION INTRAVENOUS; SUBCUTANEOUS at 09:22

## 2021-02-11 RX ADMIN — PIPERACILLIN AND TAZOBACTAM 2.25 G: 2; .25 INJECTION, POWDER, FOR SOLUTION INTRAVENOUS at 02:41

## 2021-02-11 RX ADMIN — LEVALBUTEROL HYDROCHLORIDE 1.25 MG: 1.25 SOLUTION RESPIRATORY (INHALATION) at 08:16

## 2021-02-11 RX ADMIN — NYSTATIN 1000000 UNITS: 500000 SUSPENSION ORAL at 09:20

## 2021-02-11 RX ADMIN — PREDNISONE 20 MG: 20 TABLET ORAL at 09:18

## 2021-02-11 RX ADMIN — TACROLIMUS 1.5 MG: 5 CAPSULE ORAL at 09:19

## 2021-02-11 RX ADMIN — MIDODRINE HYDROCHLORIDE 5 MG: 5 TABLET ORAL at 14:37

## 2021-02-11 RX ADMIN — ACETYLCYSTEINE 2 ML: 200 SOLUTION ORAL; RESPIRATORY (INHALATION) at 20:20

## 2021-02-11 RX ADMIN — SULFAMETHOXAZOLE AND TRIMETHOPRIM 295 MG: 200; 40 SUSPENSION ORAL at 09:21

## 2021-02-11 RX ADMIN — MIDODRINE HYDROCHLORIDE 5 MG: 5 TABLET ORAL at 20:48

## 2021-02-11 RX ADMIN — TACROLIMUS 1.5 MG: 5 CAPSULE ORAL at 18:49

## 2021-02-11 RX ADMIN — FENTANYL CITRATE 50 MCG: 50 INJECTION, SOLUTION INTRAMUSCULAR; INTRAVENOUS at 11:33

## 2021-02-11 RX ADMIN — PIPERACILLIN AND TAZOBACTAM 2.25 G: 2; .25 INJECTION, POWDER, FOR SOLUTION INTRAVENOUS at 14:37

## 2021-02-11 RX ADMIN — MIDAZOLAM 2 MG: 1 INJECTION INTRAMUSCULAR; INTRAVENOUS at 11:34

## 2021-02-11 RX ADMIN — DEXMEDETOMIDINE 1.2 MCG/KG/HR: 100 INJECTION, SOLUTION, CONCENTRATE INTRAVENOUS at 00:32

## 2021-02-11 RX ADMIN — OLANZAPINE 10 MG: 2.5 TABLET ORAL at 20:48

## 2021-02-11 RX ADMIN — LIDOCAINE HYDROCHLORIDE 0.5 ML: 10 INJECTION, SOLUTION EPIDURAL; INFILTRATION; INTRACAUDAL; PERINEURAL at 11:41

## 2021-02-11 RX ADMIN — HEPARIN SODIUM 500 UNITS: 1000 INJECTION INTRAVENOUS; SUBCUTANEOUS at 11:45

## 2021-02-11 RX ADMIN — INSULIN GLARGINE 10 UNITS: 100 INJECTION, SOLUTION SUBCUTANEOUS at 09:27

## 2021-02-11 RX ADMIN — INSULIN ASPART 1 UNITS: 100 INJECTION, SOLUTION INTRAVENOUS; SUBCUTANEOUS at 21:01

## 2021-02-11 RX ADMIN — ALBUMIN HUMAN 250 ML: 0.05 INJECTION, SOLUTION INTRAVENOUS at 11:45

## 2021-02-11 ASSESSMENT — ACTIVITIES OF DAILY LIVING (ADL)
ADLS_ACUITY_SCORE: 19
ADLS_ACUITY_SCORE: 19
ADLS_ACUITY_SCORE: 18
ADLS_ACUITY_SCORE: 18
ADLS_ACUITY_SCORE: 19
ADLS_ACUITY_SCORE: 19

## 2021-02-11 ASSESSMENT — MIFFLIN-ST. JEOR: SCORE: 1121

## 2021-02-11 NOTE — PROGRESS NOTES
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  Patient was admitted to OSH on  for acute hypoxemic respiratory failure and new lung opacities. Intubated  and transferred to [facility] for ongoing management.  Extubated  but reintubated - for progressive hypoxemia.  Rapid decompensation 2/3 with ARDS presentation requiring reintubation, prone positioning, and inhaled epoprostenol.  Significant rise in leukocytosis (2/3) concerning for worsening PJP versus secondary infection.      Today's recommendations:  - The tacro level is 3.8 today, will continue the current dose 1.5 mg BID and check a level tomorrow at 6 am, this will be a steady state level    - Bronch today showed significant post stenosis secretions in the right main stem bronchus, suctioned, agree with aggressive pulmonary toilet with albuterol and Mucomyst  - Continue IV Posaconazole and check a level on Tuesday   - Care conference today, discuss trach and peg        Acute hypoxemic respiratory failure:   Right post-obstructive Stenotrophomonas and Eikenella pneumonia:  PJP pneumonia:  Septic shock:   ARDS: Presented to OSH ED with increased SOB and hypoxia, initially requiring 4L NC but continued to decline and subsequently intubated .  Hemodynamic instability after intubation requiring pressors, transferred to [facility].  Afebrile but with leukocytosis.  COVID and respiratory panel negative.  PTA bronch (20)  with moderate airway obstruction and RML/RLL mucous plugging, cultures with Stenotrophomonas and Eikenella (IV ceftazidime 1/13-1/19).  OSH CT chest with new multifocal GGO bilaterally and increased left loculated pleural effusion.  Repeat bronch (1/24) with RUL BAL with thick copious secretions to RML/RLL, PJP PCR positive.  Extubated on 1/26, but reintubated from 1/27-1/29 for progressive hypoxemia.  Remained on either HFNC % FiO2 or BiPAP % FiO2, then eventually decompensated on 2/3 and again reintubated.  Working revealing for ARDS, proning protocol initiated (2/3) with inhaled epoprostenol, pressors initiated.  Significant rise in leukocytosis (2/3) concerning for worsening PJP vs secondary infection.  CT chest (2/3) with progressive consolidation (consistent with ARDS) and possible superimposed infection findings, stable LLL chronic empyema.  CRRT initiated 2/4 as below.  - Sputum culture/gram stain (2/3) NGTD  - Blood cultures (2/3) NGTD   - BDG fungitell (2/4) + 254 on 2/4  - ABX per MICU: Bactrim (1/24) for PJP and Steno (noted 12/23) coverage, empiric Zosyn (2/3) end date tentatively 2/12  -bactrim for the PJP pneumonia, will need 21 day course, this will cover the Eikenella and Stenothrophomonas he had on BAL from 12/23/2021  - Prednisone 20 mg once daily for 11 more days (started on 2/7)  - Pulmonary toilet with chest physiotherapy QID and nebs: Albuterol QID and Mucomyst BID, and Pulmicort BID  - Ventilator and ARDS management per MICU         Aspergillus empyema (left-sided): First noted 10/8/19, negative in November and again on admission.  CT scan on 7/17/20 with increased mass-like density, likely pleural-based, in LLL area s/p needle aspiration (8/13/20) with Aspergillus fumigatus on cultures s/p intrapleural amphotericin bead placement (11/20).  Following with ID as OP.  LFTs stable on admission (ALP chronically mildly elevated).  CT chest with increased loculated left pleural effusion  s/p thoracentesis (1/25), exudative with 87% neutrophils, very high LDL (6308), cytology normal.   - AFB pleural cultures (1/25) NGTD  - PTA posaconazole, PTA plan for indefinite course; level 0.8 on 2/4 (prior level 1/26 therapeutic), changed to IV Posaconazole 2/9/2021 due to subtherapeutic level despite increasing the enteral Posaconazole, will check Posaconazole level 1/16     S/p bilateral lung transplant for ILD 2/2 CTD:   Right mainstem bronchial stenosis (s/p dilation 3/2019): Last seen in pulmonary clinic 12/2. DSA (1/25) not detected. Continued right mainstem stenosis noted with PTA bronch on 12/23/20, mild on 1/24 bronch.   - Follow up with IP as OP for monitoring of right bronchial stenosis     Immunosuppression: On 2 drug IST d/t recurrent infections  - Tacrolimus 1/1.5mg pm  Goal level 8-10, increased the dose to 1.5 mg BID as the level was 5 2/9, check a level 2/11 and 2/12  - Chronic prednisone dose on hold with above dose steroids (5 mg qAM / 2.5 mg qPM)     Prophylaxis:   - Bactrim treatment dose as above, PTA had not been on PJP ppx since 7/28/20 as CD4 > 200    ICU Delirium: Has a hx of delirium while hospitalized. Manifests with some paranoia.   - Zyprexa and precedex gtt per primary team  - Frequent reorientation  - Discussed with  that while distressing our goal is not to sedate her further as some of the meds can make her delirium worse, but to try to verbally deescalate/reorient.      H/o CMV viremia: CMV D+/R+.  CMV <137 (1/25).  - VGCV for CMV ppx with stress dose steroids (1/26)      EBV viremia: Level 12/9/20 mildly elevated to 10K (log 4) from prior 3K (3.5 log) on 9/9/20, repeat (1/25/21) decreased to 5619 copies (log of 3.8).  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 Chronic iHD M/Th via AVF with partial graft.  Not able to tolerate iHD 2/3 d/t hypotension, line placed and transitioned to CRRT 2/4. No back to  "iHD   - Monitoring of tacrolimus as above  - Dialysis management per nephrology     Atrial fibrillation w/ RVR: H/o paroxysmal AF, last 10/2019.  Recurrence 1/30 with RVR (-140s), likely in the setting of acute pulmonary illness.  - Management per MICU    Oropharyngeal candidiasis: White tongue plaque noted 2/1.  - Nystatin QID (2/1)      We appreciate the excellent care provided by the MICU team. Recommendations communicated via in person rounding and this note. Will continue to follow along closely, please do not hesitate to call with any questions or concerns.     Discussed and seen with Dr. Henry Braun MD   Pulmonary and Critical Care Fellow   Pager [phone]      Subjective & Interval History:   Patient is better today, she is comfortable on the vent, she is sedated with Precedex gtt, she is off pressors this AM, her vent settings are improving, she is less anxious.      Review of Systems:     ROS as above, otherwise severely limited d/t intubation and sedation    Physical Exam:     Vital signs:  Temp: 98.1  F (36.7  C) Temp src: Axillary BP: 108/48 Pulse: 81   Resp: 24 SpO2: 96 % O2 Device: Mechanical Ventilator Oxygen Delivery: 60 LPM Height: 160 cm (5' 2.99\") Weight: 57.2 kg (126 lb 1.7 oz)  I/O:       Intake/Output Summary (Last 24 hours) at 2/11/2021 1342  Last data filed at 2/11/2021 1200  Gross per 24 hour   Intake 2300.41 ml   Output --   Net 2300.41 ml           Constitutional: Supine  HEENT: Orally intubated.   PULM: clear to auscultation bilaterally with no crackles or wheezing   CV: No pitting edema.   ABD: Soft, nondistended, nontender, active bowel sounds  MSK: Moving extremities.   NEURO: Follows commands   SKIN: Warm, dry. No rash on limited exam.   PSYCH: Calm.       Data:     LABS    CMP:   Recent Labs   Lab 02/11/21  0400 02/10/21  0420 02/09/21  1615 02/09/21  0403 02/08/21  1611 02/08/21  0450 02/08/21  0450 02/07/21  0400 02/07/21  0400    136 135 137 136   < > " 137   < > 137   POTASSIUM 5.0 4.4 4.3 4.4 4.5   < > 4.1   < > 4.5   CHLORIDE 103 102 102 105 104   < > 105   < > 106   CO2 25 27 28 27 29   < > 27   < > 26   ANIONGAP 7 7 5 5 3   < > 6   < > 6   * 198* 153* 139* 141*   < > 146*   < > 118*   BUN 52* 32* 20 28 21   < > 21   < > 23   CR 2.53* 1.65* 1.08* 1.38* 0.96   < > 1.06*   < > 1.17*   GFRESTIMATED 20* 34* 56* 42* 65   < > 58*   < > 51*   GFRESTBLACK 23* 39* 65 49* 75   < > 67   < > 59*   CHELSEY 8.4* 8.6 8.3* 8.8 8.9   < > 8.8   < > 8.4*   MAG 2.1 2.0  --  2.3 2.3  --  2.4*   < > 2.6*   PHOS 4.1 2.9  --  2.9 2.7  --  2.8   < > 4.0   PROTTOTAL  --   --  6.1* 5.6*  --   --  6.1*  --  6.0*   ALBUMIN  --   --  2.0* 1.8*  --   --  2.0*  --  2.0*   BILITOTAL  --   --  0.4 0.3  --   --  0.4  --  0.4   ALKPHOS  --   --  218* 182*  --   --  178*  --  166*   AST  --   --  6 <3  --   --  4  --  3   ALT  --   --  32 28  --   --  29  --  31    < > = values in this interval not displayed.     CBC:   Recent Labs   Lab 02/11/21  0645 02/11/21  0400 02/10/21  1555 02/10/21  0420   WBC 5.2 3.9* 5.3 7.2   RBC 2.47* 2.04* 2.32* 2.52*   HGB 7.1* 6.1* 7.1* 7.6*   HCT 22.6* 19.5* 22.0* 24.0*   MCV 92 96 95 95   MCH 28.7 29.9 30.6 30.2   MCHC 31.4* 31.3* 32.3 31.7   RDW 18.6* 18.8* 18.5* 18.3*   PLT 79* 83* 84* 103*       INR:   Recent Labs   Lab 02/11/21  0645   INR 1.07       Glucose:   Recent Labs   Lab 02/11/21  0401 02/11/21  0400 02/10/21  2321 02/10/21  1946 02/10/21  1550 02/10/21  1238 02/10/21  0802 02/10/21  0420 02/09/21  1615 02/09/21  1615 02/09/21  0403 02/09/21  0403 02/08/21  1611 02/08/21  1611 02/08/21  0956 02/08/21  0956   GLC  --  181*  --   --   --   --   --  198*  --  153*  --  139*  --  141*  --  156*   *  --  145* 159* 167* 184* 168*  --    < >  --    < >  --    < >  --    < >  --     < > = values in this interval not displayed.       Blood Gas:   Recent Labs   Lab 02/10/21  2000 02/10/21  1555 02/09/21  1225   O2PER 50 50 60       Culture Data    Recent Labs   Lab 02/09/21  1657 02/09/21  1649   CULT No growth after 2 days No growth after 2 days       Virology Data:   Lab Results   Component Value Date    FLUAH1 Not Detected 01/24/2021    FLUAH3 Not Detected 01/24/2021    [id number] Not Detected 01/24/2021    IFLUB Not Detected 01/24/2021    RSVA Not Detected 01/24/2021    RSVB Not Detected 01/24/2021    PIV1 Not Detected 01/24/2021    PIV2 Not Detected 01/24/2021    PIV3 Not Detected 01/24/2021    HMPV Not Detected 01/24/2021    HRVS Negative 01/24/2021    ADVBE Negative 01/24/2021    ADVC Negative 01/24/2021    ADVC Negative 12/23/2020    ADVC Negative 10/07/2019       Historical CMV results (last 3 of prior testing):  Lab Results   Component Value Date    CMVQNT <137 (A) 01/25/2021    CMVQNT 238 (A) 01/24/2021    CMVQNT 675 (A) 12/23/2020     Lab Results   Component Value Date    CMVLOG <2.1 01/25/2021    CMVLOG 2.4 (H) 01/24/2021    CMVLOG 2.8 (H) 12/23/2020       Urine Studies    Recent Labs   Lab Test 01/24/21  1729 10/21/19  2240   URINEPH 5.0 5.0   NITRITE Negative Negative   LEUKEST Moderate* Large*   WBCU 34* 115*       Most Recent [place] Pulmonary Function Testing (FVC/FEV1 only)  FVC-Pre   Date Value Ref Range Status   12/09/2020 1.12 L    09/09/2020 1.56 L    03/09/2020 1.57 L    02/19/2020 1.78 L      FVC-%Pred-Pre   Date Value Ref Range Status   12/09/2020 34 %    09/09/2020 47 %    03/09/2020 48 %    02/19/2020 54 %      FEV1-Pre   Date Value Ref Range Status   12/09/2020 0.98 L    09/09/2020 1.10 L    03/09/2020 0.96 L    02/19/2020 0.99 L      FEV1-%Pred-Pre   Date Value Ref Range Status   12/09/2020 37 %    09/09/2020 42 %    03/09/2020 37 %    02/19/2020 38 %        IMAGING    Recent Results (from the past 48 hour(s))   CT Chest w/o Contrast    Narrative    EXAMINATION: Chest CT  2/4/2021 3:49 AM    CLINICAL HISTORY: Pneumonia, effusion or abscess suspected.    COMPARISON: CT chest 1/27/2021. CT chest 12/9/2020.    TECHNIQUE: CT imaging  obtained through the chest without contrast.  Axial, coronal, and sagittal reconstructions and axial MIP reformatted  images are reviewed.     CONTRAST: None    FINDINGS:  Lines and tubes: Endotracheal tube tip terminates in the low thoracic  trachea. Right internal jugular Central venous catheter tip terminates  in the distal SVC. Enteric tube courses into the stomach.    Lungs: Postsurgical changes of bilateral lung transplant. Fluid-filled  upper trachea. Central tracheobronchial tree is otherwise patent.  Trace bilateral pleural effusions. Diffuse groundglass opacification  with interlobular septal thickening throughout the bilateral lung  fields with large confluent areas of consolidation, most pronounced in  the bilateral lower lobes. Overall the degree of consolidation has  increased from prior exam on 1/27/2021. No significant change in the  peripherally calcified chronic empyema in the left lung base.  Intrapleural amphotericin bead noted in the left lung base.    Mediastinum: The visualized thyroid is unremarkable. Cardiac size is  enlarged. No significant pericardial effusion. Normal caliber of the  aorta and main pulmonary artery. Normal branching pattern of the  aortic arch. No significant coronary artery calcium. Mild  atherosclerotic changes of the thoracic aorta. Scattered prominent  mediastinal lymph nodes, not enlarged by size criteria. Esophagus is  normal in caliber.    Bones and soft tissues: Degenerative changes of the spine. Stable  anterior wedge compression deformity of the T5 vertebral body. Old  right sixth rib fracture. Intact median sternotomy wires. No  suspicious osseous lesions. Mild diffuse anasarca.    Upper Abdomen: Limited evaluation of the upper abdomen is within  normal limits.      Impression    IMPRESSION:   1.  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 Overall the degree  of consolidation has progressed  compared to the prior CT on 1/27/2021.  Cannot exclude superimposed infection.  2. No significant change in the chronic empyema in the left lower lobe  status post intrapleural amphotericin B placement.    I have personally reviewed the examination and initial interpretation  and I agree with the findings.    MEHNAZ CHAPMAN MD

## 2021-02-11 NOTE — CONSULTS
Interventional Pulmonology          Initial Inpatient Consultation Note                                     February 11, 2021            Patient: Kecia Blue    Date of Admission: 1/24/2021  Reason for Consultation: Evaluate right mainstem anastomosis and for percutaneous tracheostomy  Requesting Physician: No referring provider defined for this encounter.      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 Interventional Pulmonology is consulted today for evaluation of right mainstem stenosis and percutaneous tracheostomy.    Present at bedside today for bronchoscopy which showed ~80% narrowing of RM anastomosis. Looking back at previous bronchs, this is due at least in some part to mismatch in size between native and transplanted lung. Patient also had significant mucous plugging in RLL. Airway dilation in setting of active infection would likely only produce transient improvement with risk of more granulation tissue. Placing a stent in setting of active infection is also not ideal given impaired airway clearance with stent in place.    --Discussed with  and MICU team that tracheostomy would be recommended given two prior failed extubation attempts and ongoing need for therapeutic suctioning. After care conference today with the remainder of the family, they are in agreement with proceeding.  --Will plan on placing percutaneous tracheostomy at bedside tomorrow.  --Please hold AM dose of heparin  --Please hold tube feeds starting at 6AM.   --Please give dose of DDAVP around 9AM tomorrow (ordered)  --After recovery from acute illness, will reassess right mainstem stenosis.     Patient seen and discussed with Dr. Jr Baugh  Interventional  Pulmonary Fellow  [phone]    Thank you for this consultation, we will continue to follow along.              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 Interventional Pulmonology is consulted today for evaluation of right mainstem stenosis and percutaneous tracheostomy.    During this hospital stay, patient was found to have PJP PNA and is on treatment with Bactrim. She's previously been extubated twice, but failed within the same day and required reintubation. Is currently slowly coming down on vent support, PEEP 8 FIO2 50% today, but is not ready for extubation.  Also found to have large mucous plugs on bronchoscopy today.     Patient previously had known right mainstem stenosis after her lung transplant in 2018. She had been dilated multiple times with improvement in stenosis, but it recurred. She's had RM stent placed twice before but both migrated and were removed. Her last bronchoscopy was done 11/2019 with plans to return in 4 months, but no followup was done. Subsequent pulmonary notes showed that she did not have any issues with her breathing after the last dilation until more recently.            Data:   All pertinent laboratory and imaging data reviewed.      Na 136 BUN 52 Cr 2.53  WBC 5.2 Hgb 7.1 Plt 79    INR 1.07    CT Chest Abd Pelvis 2/9/21  IMPRESSION:   1. No evidence of intraabdominal abscess or infection.   2. Slightly improved aeration of the diffuse groundglass opacities,  irregular interlobular septal thickening and consolidative opacities  in the lower lobes.   3. Stable size of partially calcified cystic structure abutting the  medial pleura in the left lower lobe, consistent with patient's known  chronic  empyema.  4. Cholelithiasis with mild gallbladder mucosal enhancement and trace  pericholecystic fluid, likely due to anasarca/third spacing rather  than acute cholecystitis.  5. Mild degree of intramesenteric edema, soft tissue anasarca and  dependent pelvic fluid, likely due to third spacing.  6. Chronic appearing compression deformities of the T5 and T9  vertebral bodies. No suspicious osseous lesions.  7. Support devices as described in the report.         Past Medical History:     Past Medical History:   Diagnosis Date     Abdominal pain 10/6/2019     Acute on chronic respiratory failure with hypoxia (H) 2/21/2018     Antisynthetase syndrome (H) 2014     Chronic cough      Chronic infection     recent C diff  8/18     Dehydration 8/1/2018     Dehydration 8/1/2018     Dermatomyositis (H)      Dysplasia of cervix, low grade (ESTRADA 1)      ILD (interstitial lung disease) (H)      Nausea and vomiting 12/4/2018     Osteopenia      PONV (postoperative nausea and vomiting)      Pulmonary hypertension (H)      Raynaud's disease      Seronegative rheumatoid arthritis (H)             Past Surgical History:     Past Surgical History:   Procedure Laterality Date     BRONCHOSCOPY (RIGID OR FLEXIBLE), DIAGNOSTIC N/A 4/10/2018    Procedure: COMBINED BRONCHOSCOPY (RIGID OR FLEXIBLE), LAVAGE;;  Surgeon: Mariposa Donohue MD;  Location:  GI     BRONCHOSCOPY (RIGID OR FLEXIBLE), DIAGNOSTIC N/A 12/23/2020    Procedure: BRONCHOSCOPY, WITH BRONCHOALVEOLAR LAVAGE;  Surgeon: Uri Bass MD;  Location:  GI     BRONCHOSCOPY (RIGID OR FLEXIBLE), DILATE BRONCHUS / TRACHEA N/A 10/11/2018    Procedure: BRONCHOSCOPY (RIGID OR FLEXIBLE), DILATE BRONCHUS / TRACHEA;  Flexible And Rigid Bronchoscopy and Dilation;  Surgeon: Wilber Lin MD;  Location: [facility]     BRONCHOSCOPY FLEXIBLE N/A 3/13/2018    Procedure: BRONCHOSCOPY FLEXIBLE;  Flexible Bronchoscopy ;  Surgeon: Gissell Sanchez MD;  Location: [facility]     BRONCHOSCOPY  FLEXIBLE N/A 5/9/2018    Procedure: BRONCHOSCOPY FLEXIBLE;;  Surgeon: Wilber Lin MD;  [facility]: [facility]     BRONCHOSCOPY FLEXIBLE AND RIGID N/A 9/10/2018    Procedure: BRONCHOSCOPY FLEXIBLE AND RIGID;  Flexible and Rigid Bronchoscopy with Balloon Dilation, tissue debulking with cryo, and Right mainstem bronchus stent placement;  Surgeon: Wilber Lin MD;  Location: [facility]     BRONCHOSCOPY RIGID N/A 6/6/2018    Procedure: BRONCHOSCOPY RIGID;;  Surgeon: Lopez Macias MD;  Location: [facility]     BRONCHOSCOPY, DILATE BRONCHUS, STENT BRONCHUS, COMBINED N/A 6/11/2018    Procedure: COMBINED BRONCHOSCOPY, DILATE BRONCHUS, STENT BRONCHUS;  Flexible Bronchoscopy, Balloon Dilation, Bronchial Washings;  Surgeon: Wilber Lin MD;  [facility]     BRONCHOSCOPY, DILATE BRONCHUS, STENT BRONCHUS, COMBINED Right 7/10/2018    Procedure: COMBINED BRONCHOSCOPY, DILATE BRONCHUS, STENT BRONCHUS;  Flexible Bronchoscopy, Balloon Dilation, Bronchial Washings  ;  Surgeon: Wilber Lin MD;  [facility]     BRONCHOSCOPY, DILATE BRONCHUS, STENT BRONCHUS, COMBINED N/A 8/2/2018    Procedure: COMBINED BRONCHOSCOPY, DILATE BRONCHUS, STENT BRONCHUS;  Flexible Bronchoscopy, Bronchial Washings, Balloon Dilation;  Surgeon: Wilber Lin MD;  [facility]     BRONCHOSCOPY, DILATE BRONCHUS, STENT BRONCHUS, COMBINED N/A 8/20/2018    Procedure: COMBINED BRONCHOSCOPY, DILATE BRONCHUS, STENT BRONCHUS;  Flexible Bronchoscopy, Balloon Dilation;  Surgeon: Wilber Lin MD;  [facility]     BRONCHOSCOPY, DILATE BRONCHUS, STENT BRONCHUS, COMBINED N/A 10/29/2018    Procedure: Flexible Bronchoscopy, Balloon Dilation, Stent Revision, Airway Examination And Therapeutic Suctioning, Cyro Tumor Debulking;  Surgeon: Wilber Lin MD;  [facility]     BRONCHOSCOPY, DILATE BRONCHUS, STENT BRONCHUS, COMBINED N/A 11/20/2018    Procedure: Rigid Bronchoscopy, Stent Removal and  dilitation;  Surgeon: Wilber Lin MD;  [facility]     BRONCHOSCOPY, DILATE BRONCHUS, STENT BRONCHUS, COMBINED N/A 12/14/2018    Procedure: Flexible And Rigid Bronchoscopy, Balloon Dilation, Bronchial Washing;  Surgeon: Wilber Lin MD;  [facility]     BRONCHOSCOPY, DILATE BRONCHUS, STENT BRONCHUS, COMBINED N/A 1/17/2019    Procedure: Flexible And Rigid Bronchoscopy, Balloon Dilation.;  Surgeon: Wilber Lin MD;  [facility]     BRONCHOSCOPY, DILATE BRONCHUS, STENT BRONCHUS, COMBINED N/A 3/7/2019    Procedure: Flexible and Rigid Bronchoscopy, Bronchial Washing, Balloon Dilation;  Surgeon: Wilber Lin MD;  [facility]     BRONCHOSCOPY, DILATE BRONCHUS, STENT BRONCHUS, COMBINED N/A 6/6/2019    Procedure: Rigid and Flexible Bronchoscopy, Balloon Dilation;  Surgeon: Wilber Lin MD;  [facility]     BRONCHOSCOPY, DILATE BRONCHUS, STENT BRONCHUS, COMBINED N/A 10/11/2019    Procedure: Flexible and Rigid Bronchoscopy, Balloon Dilation, Bronchoalveolar Lagave;  Surgeon: Wilber Lin MD;  [facility]     CV RIGHT HEART CATH MEASUREMENTS RECORDED N/A 3/10/2020    Procedure: CV RIGHT HEART CATH;  Surgeon: Wai Garcia MD;  Location:  HEART CARDIAC CATH LAB     ENT SURGERY      tonsillectomy as a child     ESOPHAGOSCOPY, GASTROSCOPY, DUODENOSCOPY (EGD), COMBINED N/A 10/29/2018    Procedure: COMBINED ESOPHAGOSCOPY, GASTROSCOPY, DUODENOSCOPY (EGD) with biopsies and polypectomy;  Surgeon: Chente Bloom MD;  [facility]     INSERT EXTRACORPORAL MEMBRANE OXYGENATOR ADULT (OUTSIDE OR) N/A 2/27/2018    Procedure: INSERT EXTRACORPORAL MEMBRANE OXYGENATOR ADULT (OUTSIDE OR);  INSERT EXTRACORPORAL MEMBRANE OXYGENATOR ADULT (OUTSIDE OR) ;  Surgeon: Toby Hernandez MD;  Location: [facility]     IR CVC TUNNEL PLACEMENT > 5 YRS OF AGE  10/25/2019     IR PARACENTESIS  1/8/2020     IR THORACENTESIS  9/13/2019     IR  TRANSCATHETER BIOPSY  1/8/2020     no prior surgery       REMOVE EXTRACORPORAL MEMBRANE OXYGENATOR ADULT N/A 3/3/2018    Procedure: REMOVE EXTRACORPORAL MEMBRANE OXYGENATOR ADULT;  Removal of Right Femoral Venous and Right Axillary Arterial Extracorporeal Membrane Oxygenator;  Surgeon: Toby Hernandez MD;  Location:  OR     TRANSPLANT LUNG RECIPIENT SINGLE X2 Bilateral 3/1/2018    Procedure: TRANSPLANT LUNG RECIPIENT SINGLE X2;  Median Sternotomy, Extracorporeal Membrane Oxygenator, bilateral sequential lung transplant;  Surgeon: Toby Hernandez MD;  Location:  OR            Social History:     Social History     Socioeconomic History     Marital status:      Spouse name: Not on file     Number of children: Not on file     Years of education: Not on file     Highest education level: Not on file   Occupational History     Not on file   Social Needs     Financial resource strain: Not on file     Food insecurity     Worry: Not on file     Inability: Not on file     Transportation needs     Medical: Not on file     Non-medical: Not on file   Tobacco Use     Smoking status: Never Smoker     Smokeless tobacco: Never Used   Substance and Sexual Activity     Alcohol use: No     Alcohol/week: 1.0 standard drinks     Types: 1 Glasses of wine per week     Drug use: No     Sexual activity: Not on file   Lifestyle     Physical activity     Days per week: Not on file     Minutes per session: Not on file     Stress: Not on file   Relationships     Social connections     Talks on phone: Not on file     Gets together: Not on file     Attends Bahai service: Not on file     Active member of club or organization: Not on file     Attends meetings of clubs or organizations: Not on file     Relationship status: Not on file     Intimate partner violence     Fear of current or ex partner: Not on file     Emotionally abused: Not on file     Physically abused: Not on file     Forced sexual activity: Not on  file   Other Topics Concern     Parent/sibling w/ CABG, MI or angioplasty before 65F 55M? No   Social History Narrative    3/6/2019 - Lives with . Has three daughters. Four grandchildren (two ). No pets. Travelled previously to [facility]. Has visited [place] several times.             Family History:     Family History   Problem Relation Age of Onset     Hypertension Mother      Arthritis Mother      Pancreatic Cancer Father      Diabetes Father             Allergies:   No Known Allergies         Medications:       sodium chloride 0.9%  300 mL Hemodialysis Machine Once     sodium chloride 0.9%  500 mL Intravenous Once     acetylcysteine  2 mL Nebulization BID     albumin human  250 mL Intravenous Once in dialysis     albuterol  2.5 mg Nebulization Q6H     B and C vitamin Complex with folic acid  5 mL Oral or Feeding Tube Daily     budesonide  0.5 mg Nebulization BID     fentaNYL (PF)         fentaNYL  50 mcg Intravenous Once within 24 hrs     gelatin absorbable  1 each Topical During Hemodialysis (from stock)     heparin (porcine)  500 Units Hemodialysis Machine Once in dialysis     heparin ANTICOAGULANT  5,000 Units Subcutaneous Q8H     insulin aspart  1-12 Units Subcutaneous Q4H     insulin glargine  10 Units Subcutaneous QAM AC     midazolam  2 mg Intravenous Once within 24 hrs     midodrine  5 mg Oral or Feeding Tube TID     nystatin  1,000,000 Units Mouth/Throat 4x Daily     OLANZapine  10 mg Oral or Feeding Tube Q24H     pantoprazole  40 mg Oral QAM AC     piperacillin-tazobactam  2.25 g Intravenous Q6H     polyethylene glycol  17 g Oral or Feeding Tube BID     posaconazole (NOXAFIL) intermittent infusion  300 mg Intravenous Daily     [Held by provider] predniSONE  2.5 mg Oral or Feeding Tube QPM     predniSONE  20 mg Oral Daily     [Held by provider] predniSONE  5 mg Oral or Feeding Tube QAM     sodium chloride  3 mL Nebulization BID     sulfamethoxazole-trimethoprim  295 mg Oral or  Feeding Tube BID     tacrolimus  1.5 mg Oral or Feeding Tube Q24H     tacrolimus  1.5 mg Oral or Feeding Tube Q24H     valGANciclovir  450 mg Oral Once per day on Mon Thu            Review of Systems:   Unable to obtain due to intubation         Physical Exam:   Temp:  [98.1  F (36.7  C)-99  F (37.2  C)] 99  F (37.2  C)  Pulse:  [] 81  Resp:  [22-30] 24  BP: (108-137)/(48-65) 108/48  MAP:  [55 mmHg-116 mmHg] 70 mmHg  Arterial Line BP: ()/(29-79) 93/55  FiO2 (%):  [50 %] 50 %  SpO2:  [91 %-99 %] 96 %  General: Awake, alert  EENT: No scleral icterus  Neck: Trachea midline  Lungs: Breathing comfortably on the ventilator  Abdomen: Soft, non-tender, non-distended   MSK: No edema  Neurologic: Nods appropriately to questions, follows commands appropriately  Skin: No obvious rash

## 2021-02-11 NOTE — PROGRESS NOTES
CLINICAL NUTRITION SERVICES - REASSESSMENT NOTE     Nutrition Prescription    RECOMMENDATIONS FOR MDs/PROVIDERS TO ORDER:  None today    Malnutrition Status:    Severe malnutrition in the context of chronic illness.        EVALUATION OF THE PROGRESS TOWARD GOALS   Diet: NPO  Nutrition Support: Nutren 1.5 @ 45 mL/hr via NDT which provides 1620 kcals (27 kcal/kg), 73 g PRO (1.2 g/kg), 821 mL H2O, 190 g CHO and no Fiber daily. With 2 pkt Prosource daily, total provisions = 1700 kcal (29 kcal/kg) and 95 g PRO (1.6 g PRO/kg).  Intake: received adequate TF intake over the past 6 days.      NEW FINDINGS   Pt had a bronch today, per MD, copious mucus removed. Care conf planned, trach will be discussed.  Scheduled Miralax BID is not being given.  Weight loss of 6% (down 3.5 kg) over the past week.    MALNUTRITION  % Intake: No decreased intake noted  % Weight Loss: > 2% in 1 week   Subcutaneous Fat Loss: Facial region: moderate  Muscle Loss: Temporal:  moderate, Facial & jaw region: moderate, Scapular bone: moderate Thoracic region (clavicle, acromium bone, deltoid, trapezius, pectoral): moderate, Upper arm (bicep, tricep): moderate, Lower arm (forearm): mild and Posterior calf: moderate  Fluid Accumulation/Edema: Mild (+2) b/l pedal edema.   Malnutrition Diagnosis: Severe malnutrition in the context of chronic illness.     Previous Goals   Total avg nutritional intake to meet a minimum of 25 kcal/kg and 1.2 g PRO/kg daily (per dosing wt 59 kg).  Evaluation: Met    Previous Nutrition Diagnosis  Inadequate protein-energy intake  Evaluation: Improving    CURRENT NUTRITION DIAGNOSIS  Predicted inadequate nutrient intake (calories, protein) related to prolonged hospital LOS and risk for interruptions to TF infusion.      INTERVENTIONS  Implementation  Collaboration with other providers- MICU rounds    Goals  Total avg nutritional intake to meet a minimum of 25 kcal/kg and 1.2 g PRO/kg daily (per dosing wt  59 kg).    Monitoring/Evaluation  Progress toward goals will be monitored and evaluated per protocol.    Katie Sewell RD, LD  ([facility] dietitian, [phone] (Mon-Fri))

## 2021-02-11 NOTE — CONSULTS
[facility]  PULMONARY STAFF NOTE    1. Acute resp failure 2/2 PJP PNA in setting of BSLT. IP consulted for tracheostomy and evaluation of previous RM anastomotic stenosis. On direct exam today, the RM is ~80% stenotic compared to proximal RM. There was significant mucous plugging beyond the RUL. I think the RM is an issue however I think dilation only would offer transient -at best- improvement. I am hesitant to implant a stent given active infection and significant mucous plugging. I would recommend perc tracheostomy given three failed attempts at extubation with ongoing PNA treatment, therapeutic suctioning, and supportive care. In the short-term, we could re-evaluate the RM stenosis if there is difficulty weaning from the ventilator or trach liberation. Ultimately, we will need to re-assess and intervene on the RM stenosis once her acute illness has resolved.     The patient was seen and examined with the resident/fellow physician.  We have discussed the patient in detail and I agree with the findings, assessment, and plan as documented when this note was cosigned on February 11, 2021. I have evaluated all laboratory values and imaging studies for the past 24 hours. I have reviewed all the consults that have been ordered and are active for this patient.      Billing: The patient was seen and examined by me and the findings, assessment, and plan as documented was explained to the patient/family who expressed understand.     Mati Norris MD   of Medicine  Interventional Pulmonary  Department of Pulmonary, Allergy, Critical Care and Sleep Medicine   [facility], [employer]  Pager: [phone]

## 2021-02-11 NOTE — PROGRESS NOTES
Nephrology Progress Note  02/11/2021         Attestation signed by Morgan Swanson MD at 2/11/2021 2:21 PM   Physician Attestation   I, Morgan Swanson, saw and evaluated Kecia Blue as part of a shared visit.  I have reviewed and discussed with the advanced practice provider their history, physical and plan.     I personally reviewed the vital signs, medications, labs, and imaging.     My key history or physical exam findings: Stable hemodynamics. Net positive 3L yesterday. BMP reflects ESRD with no HD between labs.      Key management decisions made by me: ESRD patient who will undergo HD run today. Next run on Saturday most likely.      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 Nephrology consutled for dialysis needs.     Interval History :   Mrs Blue had day off yesterday, seen on HD today and on track for 3L of UF, would back off for any hypotension as last run she ended up on pressors.  Planning every other day dialysis, working on weaning from vent and may need trach.  Had bronch today with very thick secretions.       Assessment & Recommendations:   ESRD-Runs at [place] through [facility].  Has atypical HD schedule of Monday and Thursday, has AVF with partial graft which has been challenging to access per RN's.  Needed CRRT 2/5-2/8 due to hemodynamic instability but otherwise has run iHD.  Working on getting to stable 3x/week schedule, may be progressing to trach for resp failure.                   -HD today, 2-3L of UF, running every other day.   "               -Has L arm AVF/graft, somewhat challenging access.                -Removed temp HD line on 2/9..        Volume status-3L UF with last run, needed vasopressor post which is now weaned off.  Planning 2-3L today as BP's are up but would back off if there are any issues.      Electrolytes/pH-K 5.0, bicarb 27 prior to HD run.      Ca/phos/pth-Ca 8.4, Mg and Phos mildly up last check.      Anemia-Hgb 7.1, down from 10 in past 2 days, acute management per team.  Checked iron stores and sats 36% on 2/3.     Nutrition-Nutren TF.       Seen and discussed with Dr Swanson     Recommendations were communicated to primary team via verbal communication.      ADRIÁN Lo CNS  Clinical Nurse Specialist  [phone]    Review of Systems:   I reviewed the following systems:  Gen: No fevers or chills  CV: No CP at rest  Resp: No SOB at rest  GI: No N/V    Physical Exam:   I/O last 3 completed shifts:  In: 2883.42 [I.V.:1293.42; NG/GT:495]  Out: -    /48   Pulse 107   Temp 98.7  F (37.1  C) (Axillary)   Resp 30   Ht 1.6 m (5' 2.99\")   Wt 57.2 kg (126 lb 1.7 oz)   LMP 06/07/2014 (Exact Date)   SpO2 99%   BMI 22.34 kg/m       GENERAL APPEARANCE: Re-intubated, proned.   EYES: no scleral icterus  Endo: no moon facies  Pulmonary: Intubated, proned, equal expansion.    CV: regular rhythm, normal rate - Edema present  GI: soft, non distended  MS: no evidence of inflammation in joints, no muscle tenderness  :  Basilio present  SKIN: warm, dry, +1 edema.    NEURO: intubated and sedated.      Labs:   All labs reviewed by me  Electrolytes/Renal -   Recent Labs   Lab Test 02/11/21  0400 02/10/21  0420 02/09/21  1615 02/09/21  0403    136 135 137   POTASSIUM 5.0 4.4 4.3 4.4   CHLORIDE 103 102 102 105   CO2 25 27 28 27   BUN 52* 32* 20 28   CR 2.53* 1.65* 1.08* 1.38*   * 198* 153* 139*   CHELSEY 8.4* 8.6 8.3* 8.8   MAG 2.1 2.0  --  2.3   PHOS 4.1 2.9  --  2.9       CBC -   Recent Labs   Lab Test " 02/11/21  0645 02/11/21  0400 02/10/21  1555   WBC 5.2 3.9* 5.3   HGB 7.1* 6.1* 7.1*   PLT 79* 83* 84*       LFTs -   Recent Labs   Lab Test 02/09/21  1615 02/09/21  0403 02/08/21  0450   ALKPHOS 218* 182* 178*   BILITOTAL 0.4 0.3 0.4   ALT 32 28 29   AST 6 <3 4   PROTTOTAL 6.1* 5.6* 6.1*   ALBUMIN 2.0* 1.8* 2.0*       Iron Panel -   Recent Labs   Lab Test 02/03/21  0415 12/13/18  1033 08/01/18  0921   IRON 51 16* 93   IRONSAT 36 7* 37   YOLA  --  302* 571*           Current Medications:    sodium chloride 0.9%  500 mL Intravenous Once     acetylcysteine  2 mL Nebulization BID     albuterol  2.5 mg Nebulization Q6H     B and C vitamin Complex with folic acid  5 mL Oral or Feeding Tube Daily     budesonide  0.5 mg Nebulization BID     gelatin absorbable  1 each Topical During Hemodialysis (from stock)     heparin ANTICOAGULANT  5,000 Units Subcutaneous Q8H     insulin aspart  1-12 Units Subcutaneous Q4H     insulin glargine  10 Units Subcutaneous QAM AC     midodrine  5 mg Oral or Feeding Tube TID     nystatin  1,000,000 Units Mouth/Throat 4x Daily     OLANZapine  10 mg Oral or Feeding Tube Q24H     pantoprazole  40 mg Oral QAM AC     piperacillin-tazobactam  2.25 g Intravenous Q6H     polyethylene glycol  17 g Oral or Feeding Tube BID     posaconazole (NOXAFIL) intermittent infusion  300 mg Intravenous Daily     [Held by provider] predniSONE  2.5 mg Oral or Feeding Tube QPM     predniSONE  20 mg Oral Daily     [Held by provider] predniSONE  5 mg Oral or Feeding Tube QAM     protein modular  1 packet Per Feeding Tube BID     sodium chloride  3 mL Nebulization BID     sulfamethoxazole-trimethoprim  295 mg Oral or Feeding Tube BID     tacrolimus  1.5 mg Oral or Feeding Tube Q24H     tacrolimus  1.5 mg Oral or Feeding Tube Q24H     valGANciclovir  450 mg Oral Once per day on Mon Thu       dexmedetomidine 1.2 mcg/kg/hr (02/11/21 1333)     dextrose       heparin (porcine) 500 Units/hr (02/11/21 1145)     norepinephrine  Stopped (02/11/21 0515)     sodium chloride 10 mL/hr at 02/08/21 0400     - MEDICATION INSTRUCTIONS -

## 2021-02-11 NOTE — PLAN OF CARE
ICU End of Shift Summary. See flowsheets for vital signs and detailed assessment.    Changes this shift: Continues to have high anxiety surrounding breathing/need for suction.  PRN versed given several times, precedex continues at 1.2.  Hgb 6.1 this AM, 1 unit PRBCs given.  K+ 5.0 this AM, plan is for dialysis today.    Plan: Dialyze today, continue vent weaning as tolerated.

## 2021-02-11 NOTE — PLAN OF CARE
ICU End of Shift Summary. See flowsheets for vital signs and detailed assessment.    Changes this shift: PEEP decreased from 10 to 8. PRN versed for anxiety. Intermittently requiring norepinephrine drip to maintain MAP>60.    Plan: Continue to monitor and follow POC.

## 2021-02-11 NOTE — PROGRESS NOTES
Care Conference    Present:  MICU team, headed by Dr. Alcantara.  Bedside nurses, Keri and Pierce.  Dr. Figueroa from Pulmonary, Wilian Osorio, Care Coordinator and myself.   Patient's spouse, Ranjan was in the room with the team.  The patient's 3 adult daughters, Nasreen, Yudelka and Evelyn were on the phone.      Braden Alcantara and Henry updated on events and progress made since last week's care conference.  Since that time, patient's ventilation needs have decreased somewhat.  She is needing less oxygen and less pressure. She has been more awake at times.  Dr. Alcantara explained that he and Dr. Norris from Inte[facility] pul[facility]ology bronched patient today.  They saw a narrowing of her airway at the right side anaastomis.  Dr. Norris feels it would be better to address this after patient is trached due to the fact that she has a lot or mucous and infection in her lungs.  Would like her cleaned out better before intervention and that would be easier to address with a trach.  Both Dr. Figueroa and Maricruz Madrigal discussed pros and cons of trach.   Dr. Alcantara addressed with patient yesterday and she was agreeable to having trach placed.  Daughter's and  all verbalized agreement with plan for trach.  Dr. Alcantara stated that a J tube would also be needed for feeding and the family was in agreement.        Patients daughters asked several questions that were answered to their satisfaction.      Wilian and JT discussed implications for rehab placement if patient is trached and on dialysis.  Wilian reviewed 2 options for LTACH in [facility].  She will make referral to both.  Discussed potential barriers.  All agreed that patient would remain here until the airway intervention could happen and patient was stable.      After the care conference, I continued to meet with Ranjan and the daughters (on the phone.)  We reviewed the discussion that Ranjan and JT had with patient yesterday about what level of quality of life would be acceptable  to patient.  Patient communicated through hand motions and writing that she would want to be able to eventually be at home and able to spend time with her family and grandchildren.  Anything beyond that was not necessary.  Re-enforced to family (and patient yesterday) that we do not know for sure we can get her that well, but that for now, patient communicated desire to fight to make that happen.      PLAN:trach potentially as early as tomorrow.  J tube next week.

## 2021-02-11 NOTE — PROGRESS NOTES
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 She was transferred from OSH ICU 1/22 and intubated and requiring vasopressors. Found to have PCP pneumonia.    Changes Today  - Continued to wean off Fi02, then PEEP as able   - Obtain Hemolysis/DIC lab  - Continue to require intermittently Levo- OFF this morning, Midodrine added yesterday. Had good response with x1 Albumin 25%  - Care conference planned for goals of care discussion for possible Tracheostomy placement, as well as PEG tube.  - Mucomyst, saline neb & albuterol scheduled for airway clearance  - Bronch this AM  - iHD today     PLAN:    Neuro:  # Pain and sedation  - Off fentanyl and versed ggts  - Continue with PRN Fent and Versed. Continue Precedex currently requiring max of 1.2mcg/kg/hr      # Delirium   - On PRN and scheduled Zyprexa, dose  increased to 10mg at HS     #Insomnia  - Melatonin      #Unequal pupils  Patient with alternating fixed and dilated pupils. CTH unremarkable. Neuro exam otherwise unremarkable  - Optho consulted, no acute concerns     Pulmonary:  #Acute Hypoxic Respiratory Failure,  Suspected 2/2 PJP initially. OSH CT 1/23 + bilateral ground glass opacities and consolidative lung infiltrates centrally distributed to the upper lobes and RLL, . Covid-19 negative X3 at OSH. Given acute and severe decompensation, concern for secondary pneumonia. 2/4 CT chest which displayed worsening consolidations consistent with progression of pneumocystis  with the possibility of superimposed secondary pneumonia. Was on Flolan.    CT chest 2/10 to assess for worsening ARDS vs fibrosis; slightly improving aeration of the diffuse ground glass opacities in lower lobes. Stable size LLL chronic empyema and no intraabdominal abscess or infection.      - PJP PCR positive, returned on 1/28- on Bactrium  -  on iHD per nephro rec  - Maintain supine position for now  - Plan for care conference with family 2/11 to discuss goals of care, possible consideration for Trach. 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  - Positive PJP PCR on 1/28 with positive fungitel   - Continue PTA tacrolimus  - Prednisone taper, holding home dose for now  - Pulmonary lung transplant team consulted and following, appreciate recs     Cardiovascular:  #Shock- resolved  #Hx Hypertension  Patient ith hypotension likely related to septic shock with some contribution of propofol. Last ECHO 10/21/20 with EF 60-65%, normal LV & RV function. Weaned off levo 2/7 then back on 2/9 using small dose.   - Hold PTA coreg and amlodipine    - HD with 3L removal, post pressor need intermittently, started Midadrine , and albumin 25%x1 2/10    # Atrial fibrillation w/ RVR  Patient with pAF with rates into 130-40s. Likely in the setting of acute pulmoary illness. Will monitor with addition of precedex     GI/Nutrition:  # Portal HTN  Liver biopsy positive for  congestive hepatopathy. Previous had ascites thought to be r/t elevated right sided heart pressures. Last saw GI on 12/21/20 and patient endorsed that her ascites is no longer present. Etiology unclear.   - NTD     # Nutrition  - NJT placed by eusebia, continue TF     # Constipation- resolved  Patient reportedly has history of constipation with bactrim use, now stooling  - Scheduled miralax ordered  - PRN dulcolax and senna-doc        Renal/Fluids/Electrolytes:  # ESRD on iHD twice weekly (M/Th).   # Anion gap metabolic acidosis r/t ESRD, uremia- Resolved  Outpatient dry body weight 56.5 kg.  - Nephrology following   - CRRT initiated 2/4, goal net negative 0-1 L to achieve dry weight, stopped 2/8, transitioned to iHD 2/9  - I&O  - Daily Weights   - Trend BMP      # Hypokalemia- resolved  - Dialysis as above     Endocrine:  # Seronegative RA with Raynaud's   - NTD, monitor   # Hyperglycemia, steroid induced  - High intensity sliding scale insulin  - 10 units glargine daily     ID:  # Sepsis with Shock  Suspected related to PCP pneumonia. OSH CT 1/23 + bilateral ground glass opacities and consolidative lung infiltrates centrally distributed to the upper lobes and RLL. Covid-19 negative X3 at OSH. Procalcitonin negative. Urine strep, CMV, and RPP negative. Given acute and severe decompensation, concern for secondary pneumonia. 2/4 CT chest which displayed worsening consolidations consistent with progression of pneumocystis with the possibility of superimposed secondary pneumonia.  - PJP PCR positive, fungitell positive as well  - Continue treatment with bactrim, prednisone taper  - Continue zosyn empiric treatment, plan for 10 days (ending 2/12)  - s/p thora 1/25, fluid exudative, negative w/u  - s/p 7 day course of empiric ceftriaxone      # Chronic/Stable right aspergillus empyema   - Continue posaconazole, treatment dosing, will monitor QTc (509 on 2/8)    Hematology:    # Anemia r/t renal disease, chronic stable   #  Thrombocytopenia r/t critical illness  # Leukocytosis, infection/steroids  Takes aranesp 25 mcg every four weeks, last dose 01/14. Next dose 2/11  - Daily CBC     Musculoskeletal:  #Weakness/Deconditioning of critical illness  - PT/OT consulted     Skin:  # Dermatomyositis antitryptase syndrome   - Noted, NTD     General Cares/Prophylaxis:    DVT Prophylaxis: Heparin SQ  GI Prophylaxis: PPI  Restraints: Not indicated  Family Communication: Updating  at patient bedside.  Care conference plan for 2/11   Code Status: Full      Lines/tubes/drains:  - HD fistula left forearm  - CVC RIJ  - PIV  - A-line  - NJT  - ETT     Disposition:  - Medical ICU     Patient seen and findings/plan discussed with medical ICU staff, Dr. Alcantara.    Luis Angel Quinones MD  Internal Medicine, PGY-1  [facility]  Pager: [phone]       INTERVAL HISTORY:   No acute event overnight. Less anxious anxious this morning able to communicate her needs with head nodding . She denies any pain or no new concern.     OBJECTIVE     Temp:  [98.3  F (36.8  C)-98.9  F (37.2  C)] 98.4  F (36.9  C)  Pulse:  [] 101  Resp:  [22-31] 25  BP: (108-137)/(48-65) 108/48  MAP:  [52 mmHg-116 mmHg] 91 mmHg  Arterial Line BP: ()/(22-79) 137/61  FiO2 (%):  [50 %-60 %] 50 %  SpO2:  [90 %-99 %] 94 %    Ventilation Mode: CMV/AC  (Continuous Mandatory Ventilation/ Assist Control)  FiO2 (%): 50 %  Rate Set (breaths/minute): 24 breaths/min  Tidal Volume Set (mL): 320 mL  PEEP (cm H2O): 8 cmH2O  Oxygen Concentration (%): 50 %  Resp: 25      Physical Exam  GEN: Laying in bed, anxious looking, intubated and alert on precedex gtts    NEURO: On sedation, eyes open and following commands.   RESP: Diffused coarse breath sounds , no wheezing  CV: Pulses intact, tachy regular with frequent PVC's  ABD: Soft, NT, ND, BS+  EXT: wwp  SKIN: No erythema, Left AV fistula with thrill and bruit    I/O last 3 completed shifts:  In: 3000.45 [I.V.:1425.45;  NG/GT:495]  Out: 0     Vitals:    02/08/21 0400 02/09/21 0400 02/11/21 0000   Weight: 57.8 kg (127 lb 6.8 oz) 55.7 kg (122 lb 12.7 oz) 57.2 kg (126 lb 1.7 oz)       ABG / VBG:   Recent Labs   Lab Test 02/10/21  2000 02/10/21  1555 02/09/21  1225 01/31/21  0441 01/31/21 0441 01/29/21  0416 01/29/21  0416   PH 7.41 7.36 7.38   < >  --    < >  --    PCO2 42 46* 49*   < >  --    < >  --    PO2 90 105 74*   < >  --    < >  --    O2PER 50 50 60   < > 70.0   < > 50.0   PHV  --   --   --   --  7.43  --  7.54*   PCO2V  --   --   --   --  41  --  35*    < > = values in this interval not displayed.       BMP:   Recent Labs   Lab Test 02/11/21  0400 02/10/21  0420 02/09/21  1615 02/09/21  0403 01/24/21  1529 01/24/21  1529 10/21/19  1459 10/21/19  1459    136 135 137   < >  --    < >  --    POTASSIUM 5.0 4.4 4.3 4.4   < >  --    < >  --    CHLORIDE 103 102 102 105   < >  --    < >  --    CO2 25 27 28 27   < >  --    < >  --    ANIONGAP 7 7 5 5   < >  --    < >  --    LACT  --   --  0.5*  --   --  0.7  --  0.5*   BUN 52* 32* 20 28   < >  --    < >  --    CR 2.53* 1.65* 1.08* 1.38*   < >  --    < >  --    * 198* 153* 139*   < >  --    < >  --    CHELSEY 8.4* 8.6 8.3* 8.8   < >  --    < >  --    MAG 2.1 2.0  --  2.3   < >  --    < >  --    PHOS 4.1 2.9  --  2.9   < >  --    < >  --     < > = values in this interval not displayed.       Hepatic Studies:   Recent Labs   Lab Test 02/09/21  1615 02/09/21  0403 02/08/21  0450   AST 6 <3 4   ALT 32 28 29   ALKPHOS 218* 182* 178*   BILITOTAL 0.4 0.3 0.4   ALBUMIN 2.0* 1.8* 2.0*       Heme Studies:   Recent Labs   Lab Test 02/11/21  0400 02/10/21  1555 02/10/21  0420 12/23/20  0838 12/23/20  0838 11/20/20  0820 11/20/20  0820 09/09/20  0822 08/13/20  1020 08/13/20  1020 03/10/20  0954   WBC 3.9* 5.3 7.2   < > 5.5   < > 6.3 13.1*   < >  --  6.3   ANEU  --   --   --   --   --   --   --  10.7*  --   --  5.0   ALYM  --   --   --   --   --   --   --  0.9  --   --  0.5*   AEOS  --    --   --   --   --   --   --  0.2  --   --  0.1   HGB 6.1* 7.1* 7.6*   < > 12.4   < > 10.3* 11.9   < >  --  10.3*   MCV 96 95 95   < > 97   < > 97 99   < >  --  100   PLT 83* 84* 103*   < > 149*   < > 161 261   < >  --  113*   INR  --   --   --   --  1.02  --  0.94  --   --  1.06  --     < > = values in this interval not displayed.       Iron Studies:   Recent Labs   Lab Test 02/03/21  0415 12/13/18  1033 08/01/18  0921 05/08/18  0709   IRON 51 16* 93 48   * 221* 248 275   IRONSAT 36 7* 37 18   YOLA  --  302* 571*  --        Urine Studies:   Recent Labs   Lab Test 01/24/21  1729 10/21/19  2240   SG 1.023 1.018   URINEPH 5.0 5.0   NITRITE Negative Negative   LEUKEST Moderate* Large*   WBCU 34* 115*   RBCU 26* 25*   PROTEIN 30* 30*       Microbiology:  No results found for: RS, FLUAG    Recent Labs   Lab Test 02/09/21  1657 02/09/21  1649 02/03/21  0941 02/03/21  0924 02/03/21  0900 02/03/21  0816 01/25/21  1347 01/24/21  1841   CULT No growth after 2 days No growth after 2 days No growth  --  No growth No growth Culture negative after 2 weeks  No anaerobes isolated  No growth  Culture received and in progress.  Positive AFB results are called as soon as detected.    Final report to follow in 7 to 8 weeks.    Assayed at [facility], [address] [place] [address] [id number] [phone] No growth   SDES Blood Right Arm Blood Right Hand Sputum  Sputum Nares Blood PICC Blood Right Hand Pleural fluid  Pleural fluid  Pleural fluid  Pleural fluid  Pleural fluid  Pleural fluid Blood Right Hand       Imaging:  No results found for this or any previous visit (from the past 24 hour(s)).

## 2021-02-11 NOTE — CONSULTS
.Patient is a 60 year old female with history of ILD status post lung transplant in 2018, now admitted with respiratory failure and PCP pneumonia. She has an NJ tube in place, with expected long term need for nutritional support. Patient's team requesting GJ tube placement. Patient will be added to IR schedule on Tuesday, 2/16/21 for new percutaneous GJ tube placement. Team reports the patient to be consentable, with resolution of delirium.    Labs WNL for procedure.      Preprocedural orders have been entered. Contrast per NJ to be given the evening prior to procedure..  Medications to be held include: Heparin.  Consent will be done prior to procedure.     Please contact the IR control at [phone] for estimated time of procedure.     Case discussed with primary team (GUZMAN Quinones MD) and IR attending physician (Dr. Trejo).    Yves Marroquin PA-C  Interventional Radiology  [phone] [facility].

## 2021-02-11 NOTE — PROGRESS NOTES
"Bronchoscopy Risk Assessment Guidelines      A. Patient symptoms to consider when assessing pulmonary TB risk are:    I. Cough greater than 3 weeks; and fever, hemoptysis, pleuritic chest    pain, weight loss greater than 10 lbs, night sweats, fatigue, infiltrates on    upper lobes or superior segments of lower lobes, cavitation on chest    x-ray.   B. Patient risk factors to consider when assessing pulmonary TB risk are:    I. Exposure to known TB case, foreign-born persons (within 5 years of    arrival to [place]), residence in a crowded setting (correctional facility,     long-term care center, etc.), persons with HIV or immunosuppression.    Patients with symptoms and risk factors should generally be considered \"suspect risk\" and bronchoscopies should be performed in airborne precautions.    This patient has NO KNOWN RISK of Tuberculosis (proceed with bronchoscopy)    Specimens sent: no  Complications: None  Scope used: #[id number]  Slim  Attending Physician: Dr. Maricruz Dupree, RT on 2/11/2021 at 11:43 AM  "

## 2021-02-11 NOTE — PROGRESS NOTES
HEMODIALYSIS TREATMENT NOTE    Date: 2/11/2021  Time: 12:12 PM    Data:  Pre Wt: 57.2 kg (126 lb 1.7 oz)   Desired Wt: 55.2 kg   Post Wt: 55.2 kg (estimated)  Weight change: 2 kg  Ultrafiltration - Post Run Net Total Removed (mL): 2000 mL  Vascular Access Status: AVF/graft - patent  Dialyzer Rinse: streaked  Total Blood Volume Processed: 91.8 L  Total Dialysis (Treatment) Time: 3.5 hours  Dialysate Bath: K 2, Ca 3  Heparin 500 units loading + 500 units/hr    Lab:   No    Interventions:  AVF/graft cannulated with 15 gauge needles, 450 BFR achieved   Lidocaine administered prior to cannulation per patient preference  5% Albumin prime / dialysate cooled to 36 degrees Celsius    Assessment:  HD for ESRD patient in ICU with respiratory failure. Alert. Somewhat anxious but cooperative. Intubated, so unable to fully assess orientation. Following commands and nodding to yes/no questions. Able to make needs known.  supportive at bedside. Precedex gtt running and PRN Versed given by ICU RN per patient request to help patient sleep. 2 L of fluid removed without complications. Intermittent tachycardia/Afib. VSS throughout otherwise.     Plan:    HD per renal team.

## 2021-02-12 ENCOUNTER — APPOINTMENT (OUTPATIENT)
Dept: OCCUPATIONAL THERAPY | Facility: CLINIC | Age: 61
End: 2021-02-12
Attending: INTERNAL MEDICINE
Payer: COMMERCIAL

## 2021-02-12 ENCOUNTER — APPOINTMENT (OUTPATIENT)
Dept: GENERAL RADIOLOGY | Facility: CLINIC | Age: 61
End: 2021-02-12
Attending: INTERNAL MEDICINE
Payer: COMMERCIAL

## 2021-02-12 LAB
ANION GAP SERPL CALCULATED.3IONS-SCNC: 7 MMOL/L (ref 3–14)
APTT PPP: 28 SEC (ref 22–37)
BLD PROD TYP BPU: NORMAL
BLD PROD TYP BPU: NORMAL
BLD UNIT ID BPU: 0
BLOOD PRODUCT CODE: NORMAL
BPU ID: NORMAL
BUN SERPL-MCNC: 33 MG/DL (ref 7–30)
CALCIUM SERPL-MCNC: 8.6 MG/DL (ref 8.5–10.1)
CHLORIDE SERPL-SCNC: 100 MMOL/L (ref 94–109)
CO2 SERPL-SCNC: 28 MMOL/L (ref 20–32)
COPATH REPORT: NORMAL
CREAT SERPL-MCNC: 1.71 MG/DL (ref 0.52–1.04)
ERYTHROCYTE [DISTWIDTH] IN BLOOD BY AUTOMATED COUNT: 19.9 % (ref 10–15)
GFR SERPL CREATININE-BSD FRML MDRD: 32 ML/MIN/{1.73_M2}
GLUCOSE BLDC GLUCOMTR-MCNC: 128 MG/DL (ref 70–99)
GLUCOSE BLDC GLUCOMTR-MCNC: 139 MG/DL (ref 70–99)
GLUCOSE BLDC GLUCOMTR-MCNC: 149 MG/DL (ref 70–99)
GLUCOSE BLDC GLUCOMTR-MCNC: 168 MG/DL (ref 70–99)
GLUCOSE BLDC GLUCOMTR-MCNC: 173 MG/DL (ref 70–99)
GLUCOSE BLDC GLUCOMTR-MCNC: 173 MG/DL (ref 70–99)
GLUCOSE BLDC GLUCOMTR-MCNC: 229 MG/DL (ref 70–99)
GLUCOSE SERPL-MCNC: 175 MG/DL (ref 70–99)
HCT VFR BLD AUTO: 23.3 % (ref 35–47)
HGB BLD-MCNC: 7.6 G/DL (ref 11.7–15.7)
INTERPRETATION ECG - MUSE: NORMAL
LABORATORY COMMENT REPORT: NORMAL
MAGNESIUM SERPL-MCNC: 1.7 MG/DL (ref 1.6–2.3)
MCH RBC QN AUTO: 30.4 PG (ref 26.5–33)
MCHC RBC AUTO-ENTMCNC: 32.6 G/DL (ref 31.5–36.5)
MCV RBC AUTO: 93 FL (ref 78–100)
NUM BPU REQUESTED: 1
PHOSPHATE SERPL-MCNC: 3.6 MG/DL (ref 2.5–4.5)
PLATELET # BLD AUTO: 114 10E9/L (ref 150–450)
PLATELET # BLD AUTO: 86 10E9/L (ref 150–450)
POTASSIUM SERPL-SCNC: 4.1 MMOL/L (ref 3.4–5.3)
RBC # BLD AUTO: 2.5 10E12/L (ref 3.8–5.2)
SARS-COV-2 RNA RESP QL NAA+PROBE: NEGATIVE
SODIUM SERPL-SCNC: 135 MMOL/L (ref 133–144)
SPECIMEN SOURCE: NORMAL
TACROLIMUS BLD-MCNC: 4.8 UG/L (ref 5–15)
TME LAST DOSE: ABNORMAL H
TRANSFUSION STATUS PATIENT QL: NORMAL
TRANSFUSION STATUS PATIENT QL: NORMAL
WBC # BLD AUTO: 4.6 10E9/L (ref 4–11)

## 2021-02-12 PROCEDURE — 250N000009 HC RX 250: Performed by: STUDENT IN AN ORGANIZED HEALTH CARE EDUCATION/TRAINING PROGRAM

## 2021-02-12 PROCEDURE — 99232 SBSQ HOSP IP/OBS MODERATE 35: CPT | Mod: 25 | Performed by: INTERNAL MEDICINE

## 2021-02-12 PROCEDURE — 250N000012 HC RX MED GY IP 250 OP 636 PS 637: Performed by: INTERNAL MEDICINE

## 2021-02-12 PROCEDURE — 250N000011 HC RX IP 250 OP 636

## 2021-02-12 PROCEDURE — 250N000013 HC RX MED GY IP 250 OP 250 PS 637: Performed by: STUDENT IN AN ORGANIZED HEALTH CARE EDUCATION/TRAINING PROGRAM

## 2021-02-12 PROCEDURE — 999N000157 HC STATISTIC RCP TIME EA 10 MIN

## 2021-02-12 PROCEDURE — 94003 VENT MGMT INPAT SUBQ DAY: CPT

## 2021-02-12 PROCEDURE — 250N000013 HC RX MED GY IP 250 OP 250 PS 637: Performed by: INTERNAL MEDICINE

## 2021-02-12 PROCEDURE — 999N000065 XR CHEST PORT 1 VW

## 2021-02-12 PROCEDURE — 94668 MNPJ CHEST WALL SBSQ: CPT

## 2021-02-12 PROCEDURE — 80048 BASIC METABOLIC PNL TOTAL CA: CPT | Performed by: STUDENT IN AN ORGANIZED HEALTH CARE EDUCATION/TRAINING PROGRAM

## 2021-02-12 PROCEDURE — 80197 ASSAY OF TACROLIMUS: CPT | Performed by: INTERNAL MEDICINE

## 2021-02-12 PROCEDURE — 97530 THERAPEUTIC ACTIVITIES: CPT | Mod: GO | Performed by: OCCUPATIONAL THERAPIST

## 2021-02-12 PROCEDURE — 250N000011 HC RX IP 250 OP 636: Performed by: INTERNAL MEDICINE

## 2021-02-12 PROCEDURE — 272N000078 HC NUTRITION PRODUCT INTERMEDIATE LITER

## 2021-02-12 PROCEDURE — 999N001017 HC STATISTIC GLUCOSE BY METER IP

## 2021-02-12 PROCEDURE — 97535 SELF CARE MNGMENT TRAINING: CPT | Mod: GO | Performed by: OCCUPATIONAL THERAPIST

## 2021-02-12 PROCEDURE — 94640 AIRWAY INHALATION TREATMENT: CPT

## 2021-02-12 PROCEDURE — 250N000009 HC RX 250: Performed by: NURSE PRACTITIONER

## 2021-02-12 PROCEDURE — 250N000013 HC RX MED GY IP 250 OP 250 PS 637: Performed by: NURSE PRACTITIONER

## 2021-02-12 PROCEDURE — 0B113F4 BYPASS TRACHEA TO CUTANEOUS WITH TRACHEOSTOMY DEVICE, PERCUTANEOUS APPROACH: ICD-10-PCS | Performed by: INTERNAL MEDICINE

## 2021-02-12 PROCEDURE — 99291 CRITICAL CARE FIRST HOUR: CPT | Mod: 25 | Performed by: INTERNAL MEDICINE

## 2021-02-12 PROCEDURE — 31600 PLANNED TRACHEOSTOMY: CPT

## 2021-02-12 PROCEDURE — U0003 INFECTIOUS AGENT DETECTION BY NUCLEIC ACID (DNA OR RNA); SEVERE ACUTE RESPIRATORY SYNDROME CORONAVIRUS 2 (SARS-COV-2) (CORONAVIRUS DISEASE [COVID-19]), AMPLIFIED PROBE TECHNIQUE, MAKING USE OF HIGH THROUGHPUT TECHNOLOGIES AS DESCRIBED BY CMS-2020-01-R: HCPCS | Performed by: NURSE PRACTITIONER

## 2021-02-12 PROCEDURE — 94640 AIRWAY INHALATION TREATMENT: CPT | Mod: 76

## 2021-02-12 PROCEDURE — 258N000003 HC RX IP 258 OP 636: Performed by: NURSE PRACTITIONER

## 2021-02-12 PROCEDURE — 84100 ASSAY OF PHOSPHORUS: CPT | Performed by: STUDENT IN AN ORGANIZED HEALTH CARE EDUCATION/TRAINING PROGRAM

## 2021-02-12 PROCEDURE — 250N000011 HC RX IP 250 OP 636: Performed by: STUDENT IN AN ORGANIZED HEALTH CARE EDUCATION/TRAINING PROGRAM

## 2021-02-12 PROCEDURE — 71045 X-RAY EXAM CHEST 1 VIEW: CPT | Mod: 26 | Performed by: RADIOLOGY

## 2021-02-12 PROCEDURE — P9037 PLATE PHERES LEUKOREDU IRRAD: HCPCS | Performed by: STUDENT IN AN ORGANIZED HEALTH CARE EDUCATION/TRAINING PROGRAM

## 2021-02-12 PROCEDURE — 36556 INSERT NON-TUNNEL CV CATH: CPT | Mod: GC | Performed by: INTERNAL MEDICINE

## 2021-02-12 PROCEDURE — U0005 INFEC AGEN DETEC AMPLI PROBE: HCPCS | Performed by: NURSE PRACTITIONER

## 2021-02-12 PROCEDURE — 85730 THROMBOPLASTIN TIME PARTIAL: CPT | Performed by: STUDENT IN AN ORGANIZED HEALTH CARE EDUCATION/TRAINING PROGRAM

## 2021-02-12 PROCEDURE — 258N000003 HC RX IP 258 OP 636: Performed by: STUDENT IN AN ORGANIZED HEALTH CARE EDUCATION/TRAINING PROGRAM

## 2021-02-12 PROCEDURE — 200N000002 HC R&B ICU UMMC

## 2021-02-12 PROCEDURE — 83735 ASSAY OF MAGNESIUM: CPT | Performed by: STUDENT IN AN ORGANIZED HEALTH CARE EDUCATION/TRAINING PROGRAM

## 2021-02-12 PROCEDURE — 99233 SBSQ HOSP IP/OBS HIGH 50: CPT | Performed by: STUDENT IN AN ORGANIZED HEALTH CARE EDUCATION/TRAINING PROGRAM

## 2021-02-12 PROCEDURE — 85049 AUTOMATED PLATELET COUNT: CPT | Performed by: INTERNAL MEDICINE

## 2021-02-12 PROCEDURE — 85027 COMPLETE CBC AUTOMATED: CPT | Performed by: STUDENT IN AN ORGANIZED HEALTH CARE EDUCATION/TRAINING PROGRAM

## 2021-02-12 PROCEDURE — 250N000011 HC RX IP 250 OP 636: Performed by: NURSE PRACTITIONER

## 2021-02-12 RX ORDER — FENTANYL CITRATE 50 UG/ML
INJECTION, SOLUTION INTRAMUSCULAR; INTRAVENOUS
Status: COMPLETED
Start: 2021-02-12 | End: 2021-02-12

## 2021-02-12 RX ORDER — HYDROXYZINE HYDROCHLORIDE 10 MG/1
10 TABLET, FILM COATED ORAL 3 TIMES DAILY PRN
Status: DISCONTINUED | OUTPATIENT
Start: 2021-02-12 | End: 2021-02-25 | Stop reason: HOSPADM

## 2021-02-12 RX ORDER — PROPOFOL 10 MG/ML
5-75 INJECTION, EMULSION INTRAVENOUS CONTINUOUS
Status: DISCONTINUED | OUTPATIENT
Start: 2021-02-12 | End: 2021-02-14

## 2021-02-12 RX ORDER — VECURONIUM BROMIDE 1 MG/ML
10 INJECTION, POWDER, LYOPHILIZED, FOR SOLUTION INTRAVENOUS
Status: COMPLETED | OUTPATIENT
Start: 2021-02-12 | End: 2021-02-12

## 2021-02-12 RX ORDER — FENTANYL CITRATE 50 UG/ML
200 INJECTION, SOLUTION INTRAMUSCULAR; INTRAVENOUS ONCE
Status: COMPLETED | OUTPATIENT
Start: 2021-02-12 | End: 2021-02-12

## 2021-02-12 RX ORDER — MAGNESIUM SULFATE HEPTAHYDRATE 40 MG/ML
2 INJECTION, SOLUTION INTRAVENOUS ONCE
Status: COMPLETED | OUTPATIENT
Start: 2021-02-12 | End: 2021-02-12

## 2021-02-12 RX ORDER — FENTANYL CITRATE 50 UG/ML
400 INJECTION, SOLUTION INTRAMUSCULAR; INTRAVENOUS
Status: COMPLETED | OUTPATIENT
Start: 2021-02-12 | End: 2021-02-12

## 2021-02-12 RX ORDER — HEPARIN SODIUM 5000 [USP'U]/.5ML
5000 INJECTION, SOLUTION INTRAVENOUS; SUBCUTANEOUS EVERY 8 HOURS
Status: DISCONTINUED | OUTPATIENT
Start: 2021-02-12 | End: 2021-02-21

## 2021-02-12 RX ORDER — FENTANYL CITRATE 50 UG/ML
200 INJECTION, SOLUTION INTRAMUSCULAR; INTRAVENOUS
Status: DISCONTINUED | OUTPATIENT
Start: 2021-02-12 | End: 2021-02-12 | Stop reason: CLARIF

## 2021-02-12 RX ORDER — FENTANYL CITRATE 50 UG/ML
INJECTION, SOLUTION INTRAMUSCULAR; INTRAVENOUS
Status: DISCONTINUED
Start: 2021-02-12 | End: 2021-02-12 | Stop reason: HOSPADM

## 2021-02-12 RX ORDER — VECURONIUM BROMIDE 1 MG/ML
6 INJECTION, POWDER, LYOPHILIZED, FOR SOLUTION INTRAVENOUS ONCE
Status: DISCONTINUED | OUTPATIENT
Start: 2021-02-12 | End: 2021-02-12

## 2021-02-12 RX ORDER — MIDODRINE HYDROCHLORIDE 5 MG/1
10 TABLET ORAL EVERY 8 HOURS
Status: DISCONTINUED | OUTPATIENT
Start: 2021-02-12 | End: 2021-02-14

## 2021-02-12 RX ADMIN — MAGNESIUM SULFATE IN WATER 2 G: 40 INJECTION, SOLUTION INTRAVENOUS at 14:17

## 2021-02-12 RX ADMIN — FENTANYL CITRATE 200 MCG: 50 INJECTION, SOLUTION INTRAMUSCULAR; INTRAVENOUS at 10:22

## 2021-02-12 RX ADMIN — FENTANYL CITRATE 400 MCG: 50 INJECTION, SOLUTION INTRAMUSCULAR; INTRAVENOUS at 11:05

## 2021-02-12 RX ADMIN — MIDODRINE HYDROCHLORIDE 10 MG: 5 TABLET ORAL at 14:47

## 2021-02-12 RX ADMIN — PIPERACILLIN AND TAZOBACTAM 2.25 G: 2; .25 INJECTION, POWDER, FOR SOLUTION INTRAVENOUS at 07:59

## 2021-02-12 RX ADMIN — NYSTATIN 1000000 UNITS: 500000 SUSPENSION ORAL at 07:58

## 2021-02-12 RX ADMIN — INSULIN ASPART 4 UNITS: 100 INJECTION, SOLUTION INTRAVENOUS; SUBCUTANEOUS at 15:44

## 2021-02-12 RX ADMIN — NYSTATIN 1000000 UNITS: 500000 SUSPENSION ORAL at 21:06

## 2021-02-12 RX ADMIN — OLANZAPINE 10 MG: 2.5 TABLET ORAL at 21:05

## 2021-02-12 RX ADMIN — DESMOPRESSIN ACETATE 17.6 MCG: 4 SOLUTION INTRAVENOUS at 09:48

## 2021-02-12 RX ADMIN — HEPARIN SODIUM 5000 UNITS: 5000 INJECTION, SOLUTION INTRAVENOUS; SUBCUTANEOUS at 21:59

## 2021-02-12 RX ADMIN — INSULIN ASPART 2 UNITS: 100 INJECTION, SOLUTION INTRAVENOUS; SUBCUTANEOUS at 12:51

## 2021-02-12 RX ADMIN — ACETYLCYSTEINE 2 ML: 200 SOLUTION ORAL; RESPIRATORY (INHALATION) at 08:46

## 2021-02-12 RX ADMIN — INSULIN ASPART 1 UNITS: 100 INJECTION, SOLUTION INTRAVENOUS; SUBCUTANEOUS at 01:08

## 2021-02-12 RX ADMIN — PREDNISONE 20 MG: 20 TABLET ORAL at 07:57

## 2021-02-12 RX ADMIN — ALBUTEROL SULFATE 2.5 MG: 2.5 SOLUTION RESPIRATORY (INHALATION) at 13:00

## 2021-02-12 RX ADMIN — SULFAMETHOXAZOLE AND TRIMETHOPRIM 295 MG: 200; 40 SUSPENSION ORAL at 07:58

## 2021-02-12 RX ADMIN — BUDESONIDE 0.5 MG: 0.5 INHALANT ORAL at 08:46

## 2021-02-12 RX ADMIN — ALBUTEROL SULFATE 2.5 MG: 2.5 SOLUTION RESPIRATORY (INHALATION) at 08:46

## 2021-02-12 RX ADMIN — SODIUM CHLORIDE 300 MG: 9 INJECTION, SOLUTION INTRAVENOUS at 08:59

## 2021-02-12 RX ADMIN — Medication 40 MG: at 07:58

## 2021-02-12 RX ADMIN — INSULIN ASPART 2 UNITS: 100 INJECTION, SOLUTION INTRAVENOUS; SUBCUTANEOUS at 03:34

## 2021-02-12 RX ADMIN — DEXMEDETOMIDINE 1.2 MCG/KG/HR: 100 INJECTION, SOLUTION, CONCENTRATE INTRAVENOUS at 08:56

## 2021-02-12 RX ADMIN — PIPERACILLIN AND TAZOBACTAM 2.25 G: 2; .25 INJECTION, POWDER, FOR SOLUTION INTRAVENOUS at 14:00

## 2021-02-12 RX ADMIN — VECURONIUM BROMIDE 10 MG: 1 INJECTION, POWDER, LYOPHILIZED, FOR SOLUTION INTRAVENOUS at 10:38

## 2021-02-12 RX ADMIN — MIDAZOLAM 10 MG: 1 INJECTION INTRAMUSCULAR; INTRAVENOUS at 11:08

## 2021-02-12 RX ADMIN — ALBUTEROL SULFATE 2.5 MG: 2.5 SOLUTION RESPIRATORY (INHALATION) at 20:00

## 2021-02-12 RX ADMIN — ALBUTEROL SULFATE 2.5 MG: 2.5 SOLUTION RESPIRATORY (INHALATION) at 01:41

## 2021-02-12 RX ADMIN — TACROLIMUS 2 MG: 5 CAPSULE ORAL at 18:02

## 2021-02-12 RX ADMIN — PROPOFOL 30 MCG/KG/MIN: 10 INJECTION, EMULSION INTRAVENOUS at 10:11

## 2021-02-12 RX ADMIN — Medication 5 ML: at 07:57

## 2021-02-12 RX ADMIN — SULFAMETHOXAZOLE AND TRIMETHOPRIM 295 MG: 200; 40 SUSPENSION ORAL at 21:05

## 2021-02-12 RX ADMIN — PIPERACILLIN AND TAZOBACTAM 2.25 G: 2; .25 INJECTION, POWDER, FOR SOLUTION INTRAVENOUS at 02:59

## 2021-02-12 RX ADMIN — NYSTATIN 1000000 UNITS: 500000 SUSPENSION ORAL at 12:51

## 2021-02-12 RX ADMIN — NYSTATIN 1000000 UNITS: 500000 SUSPENSION ORAL at 15:51

## 2021-02-12 RX ADMIN — BUDESONIDE 0.5 MG: 0.5 INHALANT ORAL at 20:00

## 2021-02-12 RX ADMIN — Medication 1 PACKET: at 21:11

## 2021-02-12 RX ADMIN — DEXMEDETOMIDINE 1.2 MCG/KG/HR: 100 INJECTION, SOLUTION, CONCENTRATE INTRAVENOUS at 02:58

## 2021-02-12 RX ADMIN — MIDODRINE HYDROCHLORIDE 5 MG: 5 TABLET ORAL at 07:58

## 2021-02-12 RX ADMIN — PIPERACILLIN AND TAZOBACTAM 2.25 G: 2; .25 INJECTION, POWDER, FOR SOLUTION INTRAVENOUS at 21:06

## 2021-02-12 RX ADMIN — SILVER NITRATE 5 APPLICATOR: 38.21; 12.74 STICK TOPICAL at 10:56

## 2021-02-12 RX ADMIN — TACROLIMUS 1.5 MG: 5 CAPSULE ORAL at 07:59

## 2021-02-12 RX ADMIN — MIDODRINE HYDROCHLORIDE 10 MG: 5 TABLET ORAL at 21:58

## 2021-02-12 RX ADMIN — MIDAZOLAM 4 MG: 1 INJECTION INTRAMUSCULAR; INTRAVENOUS at 10:26

## 2021-02-12 RX ADMIN — ACETYLCYSTEINE 2 ML: 200 SOLUTION ORAL; RESPIRATORY (INHALATION) at 20:00

## 2021-02-12 RX ADMIN — INSULIN ASPART 2 UNITS: 100 INJECTION, SOLUTION INTRAVENOUS; SUBCUTANEOUS at 23:32

## 2021-02-12 ASSESSMENT — ACTIVITIES OF DAILY LIVING (ADL)
ADLS_ACUITY_SCORE: 18
ADLS_ACUITY_SCORE: 16
ADLS_ACUITY_SCORE: 18

## 2021-02-12 NOTE — PLAN OF CARE
Problem: Adult Inpatient Plan of Care  Goal: Readiness for Transition of Care  Outcome: No Change     ICU End of Shift Summary. See flowsheets for vital signs and detailed assessment.    Changes this shift: Pt calm, cooperative, alert and oriented. Pt switched in and out of Afib throughout shift. MAPs maintained > 60 without pressors. Bronch completed. HD run completed - 2L removed. Multiple loose bowel movements throughout the day. No UOP. Care conference with family - decision made to move forward with trach placement - NPO at 0600 tomorrow.     Plan: Trach in AM. PEG placement probably sometime next week. Continue to monitor and notify team with any changes.

## 2021-02-12 NOTE — PLAN OF CARE
ICU End of Shift Summary. See flowsheets for vital signs and detailed assessment.    Changes this shift: Alert and appropriate, decreased overnight anxiety after bronch yesterday.  Remains on precedex 1.2.  Sinus arrhythmia, levo on briefly x2 for hypotension when asleep.  More productive secretions overnight, O2 down to 45%, remains on PEEP of 8.      Plan: Trach today, PEG next week. Continue to wean vent as able.

## 2021-02-12 NOTE — PLAN OF CARE
4C PT: Cancel, pt working with OT and then having tracheostomy, unable to check back later in day. Will reschedule per PT POC.

## 2021-02-12 NOTE — PROCEDURES
INTERVENTIONAL PULMONOLOGY       Procedure(s):    A flexible bronchoscopy  Airway exam  Percutaneous dilational tracheostomy   Therapeutic suctioning (1 sites)    Indication:  Prolonged intubation    Attending of Record:  Mati Norris MD     Interventional Pulmonary Fellow   Juana Baugh MD     Trainees Present:   Amie James MD    Medications:    On propofol drip  14mg versed  600 mcg fentanyl  10mg vecuronium    Sedation Time: 30 minutes    Time Out:  Performed    The patient's medical record has been reviewed.  The indication for the procedure was reviewed.  The necessary history and physical examination was performed and reviewed.  The risks, benefits and alternatives of the procedure were discussed with the the patient and  in detail and they had the opportunity to ask questions. Verbal and written informed consent was obtained.  The proposed procedure and the patient's identification were verified prior to the procedure by the physician and the bedside nurse    The patient was assessed for the adequacy for the procedure and to receive medications.   Mental Status:  Alert and oriented x 3  Airway examination:  Intubated  Pulmonary:  Breathing comfortably on vent  CV: Tachycardic  ASA Grade:  (III)  Severe systemic disease    After clinical evaluation and reviewing the indication, risks, alternatives and benefits of the procedure the patient was deemed to be in satisfactory condition to undergo the procedure.      Immediately before administration of medications the patient was re-assessed for adequacy to receive sedatives including the heart rate, respiratory rate, mental status, oxygen saturation, blood pressure and adequacy of pulmonary ventilation. These same parameters were continuously monitored throughout the procedure.    A Tuberculosis risk assessment was performed:  The patient has no known RISK of Tuberculosis    The procedure was performed in a negative airflow room: The patient  "could not be moved to a negative airflow room because of critical illness and instability    Maneuvers / Procedure:      Airway Examination: A complete airway examination was performed from the distal trachea to the subsegmental level in each lobe of both lungs.  Pertinent findings include: Right mainstem airway stenosis as noted in bronchoscopy note yesterday. Minimal secretions seen in lungs.         Percutaneous tracheostomy: Procedure description: Prior to the initiation of sedation or the procedure, a \"time-out\" was performed. The universal protocol was followed for this procedure -- the patient was identified through their armband. The procedure was confirmed and verification of consent was performed. Deep sedation was utilized for this procedure. Once the patient was adequately sedated, vecuronium was administered for paralysis. The patient was placed in the supine position. The anterior neck was prepped and draped in usual sterile fashion. 1% lidocaine was administered approximately 2 fingerbreadths above the sternal notch for local anesthesia. A 1.5-cm horizontal incision was then performed 2 fingerbreadths above the sternal notch. Using a curved Nancy, blunt dissection was performed down to the level of the pretracheal fascia. At this point, the bronchoscope was introduced through the endotracheal tube and the trachea was properly visualized. The endotracheal tube was then gradually withdrawn within the trachea under direct bronchoscopic visualization. Proper midline position was confirmed by bouncing the needle from the tracheostomy tray over the trachea with bronchoscopic examination. The needle was advanced into the trachea and proper positioning was confirmed with direct visualization. The needle was then removed leaving a white outer cannula in position. The wire from the tracheostomy tray was then advanced through the white outer cannula. The cannula was then removed. The small, blue dilator was then " advanced over the wire into the trachea. Once proper dilatation was achieved, the dilator was removed. The large, tapered dilator was then advanced over the wire into the trachea. The dilator was removed leaving the wire and white inner cannula in position. A number 8 percutaneous Shiley tracheostomy tube was then advanced over the wire and white inner cannula into the trachea. Proper positioning was confirmed with bronchoscopic visualization. The tracheostomy tube was then sutured in place with four nylon sutures. It was further secured with a tracheostomy tie. EBL was less than 5 cc    Therapeutic suctioning: 15-20min of operative time was spent clearing out the airway of debris, blood and mucous prior to the intervention.     Any disposable equipment was visually inspected and deemed to be intact immediately post procedure.      Relevant Pictures: None    Recommendations:     --Successful placement of #8 Shiley to allow for therapeutic suctioning.  --Trach sutures can be removed on POD #7 by primary team.    Mati Norris MD, MHA    of Medicine  Interventional Pulmonary  Department of Pulmonary, Allergy, Critical Care and Sleep Medicine   [facility]  Pager: [phone]   Office: [phone]  Email: [email or web address]    Krystle CHAVEZ, OCN  Clinical Nurse Specialist  Department of Thoracic Surgery  Office: [phone]  Email: [email or web address]    Chloe Avila  Interventional Pulmonology Surgery Scheduler  Office: [phone]  Email: [email or web address]

## 2021-02-12 NOTE — PROGRESS NOTES
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 Found to have PCP pneumonia.    CHANGES and MAJOR THINGS TODAY:   - Trach at bedside  - Increase Midodrine  - Wean IV PRN versed/fentanyl, Precedex infusion  - Add hydroxyzine for anxiety  - Wean RR, FiO2, and PEEP on ventilator      PLAN:  Neuro:  # Pain and sedation  - Wean Precedex  - Add prn hydroxyzine for anxiety    # Delirium  - Scheduled 10 mg at bedtime and PRN Zyprexa     # Insomnia  - Melatonin available prn    # Anisocoria (L>R)  Intermittently fixed and dilated pupils. CTH unremarkable. Neuro examination unremarkable.  - Opthomalogy evaluated on 2/1    Pulmonary:  # Acute Hypoxic Respiratory Failure  Suspected 2/2 PJP initially. OSH CT 1/23 showed bilateral ground glass opacities and consolidative lung infiltrates centrally distributed to the upper lobes and RLL. COVID negative x3 at outside hospital. Concern for secondary pneumonia given worsening clinical picture. 2/4 CT chest indicated worsening consolidations consistent with progression of pneumocystis with possibilty of superimposed secondary pneumonia. Previously on inhaled epoprostenol.  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  - Positive PJP PCR on 1/28 with positive fungitel   - Continue PTA tacrolimus  - Prednisone taper, holding home dose for now  - Pulmonary lung transplant team consulted and following, appreciate recs - may need dilation and/or stent after acute illness has resolved    Cardiovascular:  # Shock, resolved  # Transient hypotension related to dialysis, sleep  Last ECHO 10/21/20 showed EF 60-65%, normal LV and RV function.   - Wean norepinephrine as able   - Increase midodrine to 10 mg q8 hours    # Afib with RVR, resolved  - Continue to monitor    # Hx HTN  - Hold PTA carvedilol and amlodipine    GI/Nutrition:  # Portal HTN  Liver biopsy positive for congestive hepatopathy. Previous had ascites thought to be r/t elevated right sided heart pressures. Last saw GI on 12/21/20 and patient endorsed that her ascites is no longer present. Etiology unclear.   - NTD    #Nutrition  - NJT placed by radiology, continue TF  - PEG next week, tentatively scheduled for 2/16    # Constipation-resolved and now with loose stools  - Hx of constipation with bactrim, reports frequent BMs  - Senna PRN    Renal/Fluids/Electrolytes:  # ESRD on iHD twice weekly(M/Th)  # Anion gap metabolic acidosis r/t ESRD, uremia, resolved  Outpatient dry body weight 56.5 kg.  - Nephrology following   - CRRT 2/4 to 2/8  - Last HD 2/11 for 2L off   - I&O  - Daily weights   - Trend BMP     # Hypomagnesemia  - Replace Mag x1    Endocrine:  # Hyperglycemia, steroid induced  - High intensity sliding scale insulin  - 10 units glargine daily    # Seronegative RA with Raynaud's   - NTD, monitor     ID:  # Sepsis in setting of PJP pneumonia, possible secondary pneumonia  Suspected related to PJP pneumonia. OSH CT 1/23 + bilateral  ground glass opacities and consolidative lung infiltrates centrally distributed to the upper lobes and RLL. Covid-19 negative X3 at OSH. Procalcitonin negative. Urine strep, CMV, and RPP negative. Given acute and severe decompensation, concern for secondary pneumonia. 2/4 CT chest which displayed worsening consolidations consistent with progression of pneumocystis with the possibility of superimposed secondary pneumonia.  - PJP PCR positive, fungitell positive as well  - Continue treatment with bactrim, prednisone taper  - Zosyn 10-day course to be completed today  - s/p thora 1/25, fluid exudative, negative w/u  - Weekly COVID test per hospital policy     # Chronic/Stable right aspergillus empyema   - Continue posaconazole, treatment dosing, will monitor QTc (431 on 2/11)  - Posaconazole level ordered for 2/16    Hematology:    # Anemia r/t renal disease, chronic stable   # Thrombocytopenia r/t critical illness  # Leukocytosis, infection/steroids  Takes Aranesp 25 mcg every four weeks, last dose 1/14, next scheduled was 2/11.  - Discussed with nephrology, will add replacement with HD run on 2/13     Musculoskeletal:  # Generalized weakness  - PT/OT    Skin:  # Dermatomyositis antitryptase syndrome   - Noted, NTD    General Cares/Prophylaxis:    DVT Prophylaxis: Heparin sq  GI Prophylaxis: PPI  Restraints: none  Family Communication:  updated at the bedside  Code Status: full    Lines/tubes/drains:  - trach  - HD fistula left forearm  - CVC Left IJ  - Art line - remove  - NJT    Disposition:  - Medical ICU     Patient seen and findings/plan discussed with medical ICU staff, Dr. Alcantara.    Denise Reynolds NP student  Nina Salazar NP    ====================================  INTERVAL HISTORY:   No acute events overnight. Patient reports much relief with WOB following bronch yesterday. Less anxiety overnight. Denies pain or any concerns. Able to communicate.    OBJECTIVE:   1. VITAL SIGNS:   Temp:  [98.1  F  (36.7  C)-99  F (37.2  C)] 98.3  F (36.8  C)  Pulse:  [] 117  Resp:  [24-30] 26  BP: (108-137)/(48-65) 108/48  MAP:  [53 mmHg-91 mmHg] 77 mmHg  Arterial Line BP: ()/(38-63) 104/56  FiO2 (%):  [45 %-50 %] 45 %  SpO2:  [93 %-99 %] 97 %  Ventilation Mode: CMV/AC  (Continuous Mandatory Ventilation/ Assist Control)  FiO2 (%): 45 %  Rate Set (breaths/minute): 24 breaths/min  Tidal Volume Set (mL): 320 mL  PEEP (cm H2O): 8 cmH2O  Oxygen Concentration (%): 50 %  Resp: 26    2. INTAKE/ OUTPUT:   I/O last 3 completed shifts:  In: 2456.37 [I.V.:891.37; NG/GT:470]  Out: 2000 [Other:2000]    3. PHYSICAL EXAMINATION:  General: Laying in bed, intubated and alert  Neuro: On Precedex, eyes open, following commands, alert and oriented x4  Pulm/Resp: Coarse breath sounds bilaterally, breathing non-labored  CV: Sinus arrhythmia with PACs. Pulses intact. Trace edema.   Abdomen: Soft, non-distended, non-tender, bowel sounds active.   Incisions/Skin: Left AV fistula with thrill and bruit    4. LABS:   Arterial Blood Gases   Recent Labs   Lab 02/10/21  2000 02/10/21  1555 02/09/21  1225 02/09/21  0403   PH 7.41 7.36 7.38 7.42   PCO2 42 46* 49* 44   PO2 90 105 74* 116*   HCO3 26 26 29* 28     Complete Blood Count   Recent Labs   Lab 02/12/21  0315 02/11/21  0645 02/11/21  0400 02/10/21  1555   WBC 4.6 5.2 3.9* 5.3   HGB 7.6* 7.1* 6.1* 7.1*   PLT 86* 79* 83* 84*     Basic Metabolic Panel  Recent Labs   Lab 02/12/21  0315 02/11/21  0400 02/10/21  0420 02/09/21  1615    136 136 135   POTASSIUM 4.1 5.0 4.4 4.3   CHLORIDE 100 103 102 102   CO2 28 25 27 28   BUN 33* 52* 32* 20   CR 1.71* 2.53* 1.65* 1.08*   * 181* 198* 153*     Liver Function Tests  Recent Labs   Lab 02/11/21  0645 02/09/21  1615 02/09/21  0403 02/08/21  0450 02/07/21  0400   AST  --  6 <3 4 3   ALT  --  32 28 29 31   ALKPHOS  --  218* 182* 178* 166*   BILITOTAL  --  0.4 0.3 0.4 0.4   ALBUMIN  --  2.0* 1.8* 2.0* 2.0*   INR 1.07  --   --   --   --       Coagulation Profile  Recent Labs   Lab 02/12/21  0315 02/11/21  0645   INR  --  1.07   PTT 28  --        5. RADIOLOGY:   No results found for this or any previous visit (from the past 24 hour(s)).

## 2021-02-12 NOTE — PROGRESS NOTES
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  Patient was admitted to OSH on  for acute hypoxemic respiratory failure and new lung opacities. Intubated  and transferred to [facility] for ongoing management.  Extubated  but reintubated - for progressive hypoxemia.  Rapid decompensation 2/3 with ARDS presentation requiring reintubation, prone positioning, and inhaled epoprostenol.  Significant rise in leukocytosis (2/3) concerning for worsening PJP versus secondary infection.      Today's recommendations:  - The tacro level is 4.8 steady state level, will increase her tacro dose to 2 mg BID   - Trach placement today   - Continue aggressive pulmonary toilet with albuterol and Mucomyst  - Stop IV zosyn today   - Continue IV Posaconazole and check a level on Tuesday        Acute hypoxemic respiratory failure:   Right post-obstructive Stenotrophomonas and Eikenella pneumonia:  PJP pneumonia:  Septic shock:   ARDS: Presented to OSH ED with increased SOB and hypoxia, initially requiring 4L NC but continued to decline and subsequently intubated .  Hemodynamic instability after intubation requiring pressors, transferred to [facility].  Afebrile but with leukocytosis.  COVID and respiratory panel negative.  PTA bronch (20) with moderate airway obstruction and RML/RLL mucous plugging, cultures with Stenotrophomonas and Eikenella (IV ceftazidime -).  OSH CT chest with new multifocal GGO  bilaterally and increased left loculated pleural effusion.  Repeat bronch (1/24) with RUL BAL with thick copious secretions to RML/RLL, PJP PCR positive.  Extubated on 1/26, but reintubated from 1/27-1/29 for progressive hypoxemia.  Remained on either HFNC % FiO2 or BiPAP % FiO2, then eventually decompensated on 2/3 and again reintubated.  Working revealing for ARDS, proning protocol initiated (2/3) with inhaled epoprostenol, pressors initiated.  Significant rise in leukocytosis (2/3) concerning for worsening PJP vs secondary infection.  CT chest (2/3) with progressive consolidation (consistent with ARDS) and possible superimposed infection findings, stable LLL chronic empyema.  CRRT initiated 2/4 as below.  - Sputum culture/gram stain (2/3) NGTD  - Blood cultures (2/3) NGTD   - BDG fungitell (2/4) + 254 on 2/4  - ABX per MICU: Bactrim (1/24) for PJP and Steno (noted 12/23) coverage, empiric Zosyn (2/3) end date 2/12  -bactrim for the PJP pneumonia, will need 21 day course, this will cover the Eikenella and Stenothrophomonas he had on BAL from 12/23/2021  - Prednisone 20 mg once daily for 11 more days (started on 2/7)  - Pulmonary toilet with chest physiotherapy QID and nebs: Albuterol QID and Mucomyst BID, and Pulmicort BID  - Ventilator and ARDS management per MICU         Aspergillus empyema (left-sided): First noted 10/8/19, negative in November and again on admission.  CT scan on 7/17/20 with increased mass-like density, likely pleural-based, in LLL area s/p needle aspiration (8/13/20) with Aspergillus fumigatus on cultures s/p intrapleural amphotericin bead placement (11/20).  Following with ID as OP.  LFTs stable on admission (ALP chronically mildly elevated).  CT chest with increased loculated left pleural effusion s/p thoracentesis (1/25), exudative with 87% neutrophils, very high LDL (6308), cytology normal.   - AFB pleural cultures (1/25) NGTD  - PTA posaconazole, PTA plan for indefinite  course; level 0.8 on 2/4 (prior level 1/26 therapeutic), changed to IV Posaconazole 2/9/2021 due to subtherapeutic level despite increasing the enteral Posaconazole, will check Posaconazole level 1/16     S/p bilateral lung transplant for ILD 2/2 CTD:   Right mainstem bronchial stenosis (s/p dilation 3/2019): Last seen in pulmonary clinic 12/2. DSA (1/25) not detected. Continued right mainstem stenosis noted with PTA bronch on 12/23/20, mild on 1/24 bronch.   - Follow up with IP as OP for monitoring of right bronchial stenosis     Immunosuppression: On 2 drug IST d/t recurrent infections  - Tacrolimus level 2/12 us 4.8, will increase the dose from 1.5 mg BID to 2 mg BID.  Goal level 8-10, check level on 2/15  - Chronic prednisone dose on hold with above dose steroids (5 mg qAM / 2.5 mg qPM)     Prophylaxis:   - Bactrim treatment dose as above, PTA had not been on PJP ppx since 7/28/20 as CD4 > 200    ICU Delirium: Has a hx of delirium while hospitalized. Manifests with some paranoia.   - Zyprexa and precedex gtt per primary team  - Frequent reorientation  - Discussed with  that while distressing our goal is not to sedate her further as some of the meds can make her delirium worse, but to try to verbally deescalate/reorient.      H/o CMV viremia: CMV D+/R+.  CMV <137 (1/25).  - VGCV for CMV ppx with stress dose steroids (1/26)      EBV viremia: Level 12/9/20 mildly elevated to 10K (log 4) from prior 3K (3.5 log) on 9/9/20, repeat (1/25/21) decreased to 5619 copies (log of 3.8).  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 Chronic iHD M/Th via AVF with partial graft.  Not able to tolerate iHD 2/3 d/t hypotension, line placed and transitioned to CRRT 2/4. No back to iHD   - Monitoring of tacrolimus as above  - Dialysis management per nephrology     Atrial fibrillation w/ RVR: H/o paroxysmal AF, last 10/2019.  Recurrence 1/30 with RVR  "(-140s), likely in the setting of acute pulmonary illness.  - Management per MICU    Oropharyngeal candidiasis: White tongue plaque noted 2/1.  - Nystatin QID (2/1)      We appreciate the excellent care provided by the MICU team. Recommendations communicated via in person rounding and this note. Will continue to follow along closely, please do not hesitate to call with any questions or concerns.     Discussed and seen with Dr. Henry Braun MD   Pulmonary and Critical Care Fellow   Pager [phone]      Subjective & Interval History:   Patient is much better looking, she is comfortable on the ventilator and she is excited about the procedure today, she is scheduled for trach this AM.  She is on low dose pressor support with Norepi 0.02.   She is comfortable with her breathing.    Review of Systems:     ROS as above, otherwise severely limited d/t intubation and sedation    Physical Exam:     Vital signs:  Temp: 98.3  F (36.8  C) Temp src: Axillary   Pulse: 76   Resp: 24 SpO2: 92 % O2 Device: Mechanical Ventilator Oxygen Delivery: 60 LPM Height: 160 cm (5' 2.99\") Weight: 57.2 kg (126 lb 1.7 oz)  I/O:         Intake/Output Summary (Last 24 hours) at 2/12/2021 1451  Last data filed at 2/12/2021 1400  Gross per 24 hour   Intake 2593.29 ml   Output 2000 ml   Net 593.29 ml               Constitutional: Supine  HEENT: Orally intubated.   PULM: clear to auscultation bilaterally with no crackles or wheezing   CV: No pitting edema.   ABD: Soft, nondistended, nontender, active bowel sounds  MSK: Moving extremities.   NEURO: Follows commands   SKIN: Warm, dry. No rash on limited exam.   PSYCH: Calm.       Data:     LABS    CMP:   Recent Labs   Lab 02/12/21  0315 02/11/21  0400 02/10/21  0420 02/09/21  1615 02/09/21  0403 02/08/21  0450 02/08/21  0450 02/07/21  0400 02/07/21  0400    136 136 135 137   < > 137   < > 137   POTASSIUM 4.1 5.0 4.4 4.3 4.4   < > 4.1   < > 4.5   CHLORIDE 100 103 102 102 105   " < > 105   < > 106   CO2 28 25 27 28 27   < > 27   < > 26   ANIONGAP 7 7 7 5 5   < > 6   < > 6   * 181* 198* 153* 139*   < > 146*   < > 118*   BUN 33* 52* 32* 20 28   < > 21   < > 23   CR 1.71* 2.53* 1.65* 1.08* 1.38*   < > 1.06*   < > 1.17*   GFRESTIMATED 32* 20* 34* 56* 42*   < > 58*   < > 51*   GFRESTBLACK 38* 23* 39* 65 49*   < > 67   < > 59*   CHELSEY 8.6 8.4* 8.6 8.3* 8.8   < > 8.8   < > 8.4*   MAG 1.7 2.1 2.0  --  2.3   < > 2.4*   < > 2.6*   PHOS 3.6 4.1 2.9  --  2.9   < > 2.8   < > 4.0   PROTTOTAL  --   --   --  6.1* 5.6*  --  6.1*  --  6.0*   ALBUMIN  --   --   --  2.0* 1.8*  --  2.0*  --  2.0*   BILITOTAL  --   --   --  0.4 0.3  --  0.4  --  0.4   ALKPHOS  --   --   --  218* 182*  --  178*  --  166*   AST  --   --   --  6 <3  --  4  --  3   ALT  --   --   --  32 28  --  29  --  31    < > = values in this interval not displayed.     CBC:   Recent Labs   Lab 02/12/21  0315 02/11/21  0645 02/11/21  0400 02/10/21  1555   WBC 4.6 5.2 3.9* 5.3   RBC 2.50* 2.47* 2.04* 2.32*   HGB 7.6* 7.1* 6.1* 7.1*   HCT 23.3* 22.6* 19.5* 22.0*   MCV 93 92 96 95   MCH 30.4 28.7 29.9 30.6   MCHC 32.6 31.4* 31.3* 32.3   RDW 19.9* 18.6* 18.8* 18.5*   PLT 86* 79* 83* 84*       INR:   Recent Labs   Lab 02/11/21  0645   INR 1.07       Glucose:   Recent Labs   Lab 02/12/21  0315 02/12/21  0107 02/11/21  2059 02/11/21  1638 02/11/21  1142 02/11/21  0924 02/11/21  0400 02/11/21  0400 02/10/21  0420 02/10/21  0420 02/09/21  1615 02/09/21  1615 02/09/21  0403 02/09/21  0403 02/08/21  1611 02/08/21  1611   *  --   --   --   --   --   --  181*  --  198*  --  153*  --  139*  --  141*   * 149* 144* 169* 163* 185*   < >  --    < >  --    < >  --    < >  --    < >  --     < > = values in this interval not displayed.       Blood Gas:   Recent Labs   Lab 02/10/21  2000 02/10/21  1555 02/09/21  1225   O2PER 50 50 60       Culture Data   Recent Labs   Lab 02/09/21  1657 02/09/21  1649   CULT No growth after 2 days No growth after 2  days       Virology Data:   Lab Results   Component Value Date    FLUAH1 Not Detected 01/24/2021    FLUAH3 Not Detected 01/24/2021    LD5197 Not Detected 01/24/2021    IFLUB Not Detected 01/24/2021    RSVA Not Detected 01/24/2021    RSVB Not Detected 01/24/2021    PIV1 Not Detected 01/24/2021    PIV2 Not Detected 01/24/2021    PIV3 Not Detected 01/24/2021    HMPV Not Detected 01/24/2021    HRVS Negative 01/24/2021    ADVBE Negative 01/24/2021    ADVC Negative 01/24/2021    ADVC Negative 12/23/2020    ADVC Negative 10/07/2019       Historical CMV results (last 3 of prior testing):  Lab Results   Component Value Date    CMVQNT <137 (A) 01/25/2021    CMVQNT 238 (A) 01/24/2021    CMVQNT 675 (A) 12/23/2020     Lab Results   Component Value Date    CMVLOG <2.1 01/25/2021    CMVLOG 2.4 (H) 01/24/2021    CMVLOG 2.8 (H) 12/23/2020       Urine Studies    Recent Labs   Lab Test 01/24/21  1729 10/21/19  2240   URINEPH 5.0 5.0   NITRITE Negative Negative   LEUKEST Moderate* Large*   WBCU 34* 115*       Most Recent Breeze Pulmonary Function Testing (FVC/FEV1 only)  FVC-Pre   Date Value Ref Range Status   12/09/2020 1.12 L    09/09/2020 1.56 L    03/09/2020 1.57 L    02/19/2020 1.78 L      FVC-%Pred-Pre   Date Value Ref Range Status   12/09/2020 34 %    09/09/2020 47 %    03/09/2020 48 %    02/19/2020 54 %      FEV1-Pre   Date Value Ref Range Status   12/09/2020 0.98 L    09/09/2020 1.10 L    03/09/2020 0.96 L    02/19/2020 0.99 L      FEV1-%Pred-Pre   Date Value Ref Range Status   12/09/2020 37 %    09/09/2020 42 %    03/09/2020 37 %    02/19/2020 38 %        IMAGING    Recent Results (from the past 48 hour(s))   CT Chest w/o Contrast    Narrative    EXAMINATION: Chest CT  2/4/2021 3:49 AM    CLINICAL HISTORY: Pneumonia, effusion or abscess suspected.    COMPARISON: CT chest 1/27/2021. CT chest 12/9/2020.    TECHNIQUE: CT imaging obtained through the chest without contrast.  Axial, coronal, and sagittal reconstructions and axial  MIP reformatted  images are reviewed.     CONTRAST: None    FINDINGS:  Lines and tubes: Endotracheal tube tip terminates in the low thoracic  trachea. Right internal jugular Central venous catheter tip terminates  in the distal SVC. Enteric tube courses into the stomach.    Lungs: Postsurgical changes of bilateral lung transplant. Fluid-filled  upper trachea. Central tracheobronchial tree is otherwise patent.  Trace bilateral pleural effusions. Diffuse groundglass opacification  with interlobular septal thickening throughout the bilateral lung  fields with large confluent areas of consolidation, most pronounced in  the bilateral lower lobes. Overall the degree of consolidation has  increased from prior exam on 1/27/2021. No significant change in the  peripherally calcified chronic empyema in the left lung base.  Intrapleural amphotericin bead noted in the left lung base.    Mediastinum: The visualized thyroid is unremarkable. Cardiac size is  enlarged. No significant pericardial effusion. Normal caliber of the  aorta and main pulmonary artery. Normal branching pattern of the  aortic arch. No significant coronary artery calcium. Mild  atherosclerotic changes of the thoracic aorta. Scattered prominent  mediastinal lymph nodes, not enlarged by size criteria. Esophagus is  normal in caliber.    Bones and soft tissues: Degenerative changes of the spine. Stable  anterior wedge compression deformity of the T5 vertebral body. Old  right sixth rib fracture. Intact median sternotomy wires. No  suspicious osseous lesions. Mild diffuse anasarca.    Upper Abdomen: Limited evaluation of the upper abdomen is within  normal limits.      Impression    IMPRESSION:   1.  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 Overall the degree  of consolidation has progressed compared to the prior CT on 1/27/2021.  Cannot exclude superimposed infection.  2. No significant  change in the chronic empyema in the left lower lobe  status post intrapleural amphotericin B placement.    I have personally reviewed the examination and initial interpretation  and I agree with the findings.    MEHNAZ CHAPMAN MD

## 2021-02-12 NOTE — PROGRESS NOTES
Nephrology Progress Note  02/12/2021         Kecia Blue is a 60 year old female with PMHx most significant for ILD with antisynthetase sydrome s/p BSLT 03/2018 (CMV D+/R+, EBV D+/R+) postoperatively complicated by Left mainstem bronchial stenosis s/p several dilations, left sided aspergillus empyema (10/2019), and CMV viremia who was intubated for HRF at OSH and transferred to [facility] for continued management. Nephrology consutled for dialysis needs.     Interval History :   Mrs Blue had HD yesterday, pulled 2L of UF, did end up starting on pressor but lower dose than after last run and is now weaned off.  Plan for day off today, ordered HD for tomorrow, starting EPO now that she is stable off of CRRT.       Assessment & Recommendations:   ESRD-Runs at [place] through [facility] group.  Has atypical HD schedule of Monday and Thursday, has AVF with partial graft which has been challenging to access per RN's.  Needed CRRT 2/5-2/8 due to hemodynamic instability but otherwise has run iHD.  Working on getting to stable 3x/week schedule, may be progressing to trach for resp failure.                   -Holding on HD today, run ordered for tomorrow.                  -Has L arm AVF/graft, somewhat challenging access.                -Removed temp HD line on 2/9..        Volume status-2L UF with last run, needed vasopressor post which is now weaned off.  A bit sensitive to fluid removal but making some progress.       Electrolytes/pH-K 4.1, bicarb 28.      Ca/phos/pth-Ca 8.6, Mg and Phos mildly up last check.      Anemia-Hgb 7.6, down from 10 in past 2 days, acute management per team.  Checked iron stores and sats 36% on 2/3, starting EPO 4k with runs.       Nutrition-Nutren TF.       Seen and discussed with Dr Swanson     Recommendations were communicated to primary team via verbal communication.        ADRIÁN Lo CNS  Clinical Nurse Specialist  [phone]    Review of Systems:   I reviewed the  "following systems:  Gen: No fevers or chills  CV: No CP at rest  Resp: No SOB at rest  GI: No N/V    Physical Exam:   I/O last 3 completed shifts:  In: 2593.29 [I.V.:1213.29; NG/GT:420]  Out: 2000 [Other:2000]   /48   Pulse 126   Temp 98.4  F (36.9  C) (Oral)   Resp 24   Ht 1.6 m (5' 2.99\")   Wt 57.2 kg (126 lb 1.7 oz)   LMP 06/07/2014 (Exact Date)   SpO2 99%   BMI 22.34 kg/m       GENERAL APPEARANCE: Re-intubated, proned.   EYES: no scleral icterus  Endo: no moon facies  Pulmonary: Intubated, proned, equal expansion.    CV: regular rhythm, normal rate - Edema present  GI: soft, non distended  MS: no evidence of inflammation in joints, no muscle tenderness  :  Basilio present  SKIN: warm, dry, +1 edema.    NEURO: intubated and sedated.     Labs:   All labs reviewed by me  Electrolytes/Renal -   Recent Labs   Lab Test 02/12/21  0315 02/11/21  0400 02/10/21  0420    136 136   POTASSIUM 4.1 5.0 4.4   CHLORIDE 100 103 102   CO2 28 25 27   BUN 33* 52* 32*   CR 1.71* 2.53* 1.65*   * 181* 198*   CHELSEY 8.6 8.4* 8.6   MAG 1.7 2.1 2.0   PHOS 3.6 4.1 2.9       CBC -   Recent Labs   Lab Test 02/12/21  0315 02/11/21  0645 02/11/21  0400   WBC 4.6 5.2 3.9*   HGB 7.6* 7.1* 6.1*   PLT 86* 79* 83*       LFTs -   Recent Labs   Lab Test 02/09/21  1615 02/09/21  0403 02/08/21  0450   ALKPHOS 218* 182* 178*   BILITOTAL 0.4 0.3 0.4   ALT 32 28 29   AST 6 <3 4   PROTTOTAL 6.1* 5.6* 6.1*   ALBUMIN 2.0* 1.8* 2.0*       Iron Panel -   Recent Labs   Lab Test 02/03/21  0415 12/13/18  1033 08/01/18  0921   IRON 51 16* 93   IRONSAT 36 7* 37   YOLA  --  302* 571*           Current Medications:    acetylcysteine  2 mL Nebulization BID     albuterol  2.5 mg Nebulization Q6H     B and C vitamin Complex with folic acid  5 mL Oral or Feeding Tube Daily     budesonide  0.5 mg Nebulization BID     ceFAZolin  2 g Intravenous Pre-Op/Pre-procedure x 1 dose     fentaNYL (PF)         heparin ANTICOAGULANT  5,000 Units Subcutaneous Q8H "     insulin aspart  1-12 Units Subcutaneous Q4H     insulin glargine  10 Units Subcutaneous QAM AC     iohexol  50 mL ORAL OR NJ TUBE Once     midodrine  10 mg Oral or Feeding Tube Q8H     nystatin  1,000,000 Units Mouth/Throat 4x Daily     OLANZapine  10 mg Oral or Feeding Tube Q24H     pantoprazole  40 mg Oral QAM AC     piperacillin-tazobactam  2.25 g Intravenous Q6H     polyethylene glycol  17 g Oral or Feeding Tube BID     posaconazole (NOXAFIL) intermittent infusion  300 mg Intravenous Daily     [Held by provider] predniSONE  2.5 mg Oral or Feeding Tube QPM     predniSONE  20 mg Oral Daily     [Held by provider] predniSONE  5 mg Oral or Feeding Tube QAM     protein modular  1 packet Per Feeding Tube BID     sodium chloride (PF)  3 mL Intracatheter Q8H     sodium chloride  3 mL Nebulization BID     sulfamethoxazole-trimethoprim  295 mg Oral or Feeding Tube BID     tacrolimus  2 mg Oral or Feeding Tube Q24H     [START ON 2/13/2021] tacrolimus  2 mg Oral or Feeding Tube Q24H     valGANciclovir  450 mg Oral Once per day on Mon Thu       dexmedetomidine Stopped (02/12/21 1013)     dextrose       - MEDICATION INSTRUCTIONS -       norepinephrine Stopped (02/12/21 1345)     propofol (DIPRIVAN) infusion Stopped (02/12/21 1400)     sodium chloride 0.9%       sodium chloride 10 mL/hr at 02/08/21 0400

## 2021-02-12 NOTE — PROCEDURES
PROCEDURE:  Ultrasound guided right internal jugular central venous catheter.     INDICATION:  On vasopressors, vascular access    PROCEDURE : Amie James MD    SUPERVISOR:  Dr. Mati Norris    CONSENT:  Obtained and in the paper chart    UNIVERSAL PROTOCOL: Patient Identification was verified, time out was performed, site marking was done. Imaging data reviewed. Full Barrier precaution done: Hands washed, mask, gloves, gown, cap and eye protection all used.     PROCEDURE SUMMARY:   A time-out was performed. The patient's right neck region was prepped and draped in sterile fashion. The introducer needle was then inserted and visualized to be in the vein on ultrasound and venous blood was withdrawn into the syringe. The syringe was removed and the guidewire was advanced through the introducer needle. Ultrasound again documented a wire within the vein.  The introducer needle was removed and a small incision was made with a scalpel over the wire.  A dilator was advanced over the guidewire until appropriate dilation was obtained. The dilator was removed and a triple-lumen catheter was advanced over the guidewire and secured into place with 4 sutures at 15 cm. At time of procedure completion, all ports aspirated and flushed properly. Post-procedure x-ray pending.    Dr. Norris was present for the key portions of the procedure.    COMPLICATIONS:  None    ESTIMATED BLOOD LOSS: 5-10mL    Amie James MD  Critical Care Fellow

## 2021-02-12 NOTE — PROGRESS NOTES
"Bronchoscopy Risk Assessment Guidelines      A. Patient symptoms to consider when assessing pulmonary TB risk are:    I. Cough greater than 3 weeks; and fever, hemoptysis, pleuritic chest    pain, weight loss greater than 10 lbs, night sweats, fatigue, infiltrates on    upper lobes or superior segments of lower lobes, cavitation on chest    x-ray.   B. Patient risk factors to consider when assessing pulmonary TB risk are:    I. Exposure to known TB case, foreign-born persons (within 5 years of    arrival to [place]), residence in a crowded setting (correctional facility,     long-term care center, etc.), persons with HIV or immunosuppression.    Patients with symptoms and risk factors should generally be considered \"suspect risk\" and bronchoscopies should be performed in airborne precautions.    This patient has NO KNOWN RISK of Tuberculosis (proceed with bronchoscopy)    Specimens sent: no  Complications: None  Scope used: #[id number] [id number]  Attending Physician: Dr. Jr Dupree, RT on 2/12/2021 at 11:55 AM  "

## 2021-02-13 ENCOUNTER — APPOINTMENT (OUTPATIENT)
Dept: PHYSICAL THERAPY | Facility: CLINIC | Age: 61
End: 2021-02-13
Attending: INTERNAL MEDICINE
Payer: COMMERCIAL

## 2021-02-13 LAB
ANION GAP SERPL CALCULATED.3IONS-SCNC: 8 MMOL/L (ref 3–14)
BUN SERPL-MCNC: 52 MG/DL (ref 7–30)
C DIFF TOX B STL QL: NEGATIVE
CALCIUM SERPL-MCNC: 8.7 MG/DL (ref 8.5–10.1)
CHLORIDE SERPL-SCNC: 100 MMOL/L (ref 94–109)
CO2 SERPL-SCNC: 26 MMOL/L (ref 20–32)
CREAT SERPL-MCNC: 2.59 MG/DL (ref 0.52–1.04)
ERYTHROCYTE [DISTWIDTH] IN BLOOD BY AUTOMATED COUNT: 20.1 % (ref 10–15)
GFR SERPL CREATININE-BSD FRML MDRD: 20 ML/MIN/{1.73_M2}
GLUCOSE BLDC GLUCOMTR-MCNC: 137 MG/DL (ref 70–99)
GLUCOSE BLDC GLUCOMTR-MCNC: 143 MG/DL (ref 70–99)
GLUCOSE BLDC GLUCOMTR-MCNC: 153 MG/DL (ref 70–99)
GLUCOSE BLDC GLUCOMTR-MCNC: 154 MG/DL (ref 70–99)
GLUCOSE BLDC GLUCOMTR-MCNC: 185 MG/DL (ref 70–99)
GLUCOSE SERPL-MCNC: 142 MG/DL (ref 70–99)
HCT VFR BLD AUTO: 23.9 % (ref 35–47)
HGB BLD-MCNC: 7.8 G/DL (ref 11.7–15.7)
MAGNESIUM SERPL-MCNC: 2.4 MG/DL (ref 1.6–2.3)
MCH RBC QN AUTO: 31 PG (ref 26.5–33)
MCHC RBC AUTO-ENTMCNC: 32.6 G/DL (ref 31.5–36.5)
MCV RBC AUTO: 95 FL (ref 78–100)
PHOSPHATE SERPL-MCNC: 5.3 MG/DL (ref 2.5–4.5)
PLATELET # BLD AUTO: 114 10E9/L (ref 150–450)
POTASSIUM SERPL-SCNC: 4.2 MMOL/L (ref 3.4–5.3)
RBC # BLD AUTO: 2.52 10E12/L (ref 3.8–5.2)
SODIUM SERPL-SCNC: 134 MMOL/L (ref 133–144)
SPECIMEN SOURCE: NORMAL
WBC # BLD AUTO: 7.2 10E9/L (ref 4–11)

## 2021-02-13 PROCEDURE — 250N000013 HC RX MED GY IP 250 OP 250 PS 637: Performed by: NURSE PRACTITIONER

## 2021-02-13 PROCEDURE — 84100 ASSAY OF PHOSPHORUS: CPT | Performed by: STUDENT IN AN ORGANIZED HEALTH CARE EDUCATION/TRAINING PROGRAM

## 2021-02-13 PROCEDURE — 250N000012 HC RX MED GY IP 250 OP 636 PS 637: Performed by: STUDENT IN AN ORGANIZED HEALTH CARE EDUCATION/TRAINING PROGRAM

## 2021-02-13 PROCEDURE — 250N000013 HC RX MED GY IP 250 OP 250 PS 637: Performed by: STUDENT IN AN ORGANIZED HEALTH CARE EDUCATION/TRAINING PROGRAM

## 2021-02-13 PROCEDURE — 250N000013 HC RX MED GY IP 250 OP 250 PS 637: Performed by: INTERNAL MEDICINE

## 2021-02-13 PROCEDURE — 97530 THERAPEUTIC ACTIVITIES: CPT | Mod: GP

## 2021-02-13 PROCEDURE — 80048 BASIC METABOLIC PNL TOTAL CA: CPT | Performed by: STUDENT IN AN ORGANIZED HEALTH CARE EDUCATION/TRAINING PROGRAM

## 2021-02-13 PROCEDURE — 999N000157 HC STATISTIC RCP TIME EA 10 MIN

## 2021-02-13 PROCEDURE — 83735 ASSAY OF MAGNESIUM: CPT | Performed by: STUDENT IN AN ORGANIZED HEALTH CARE EDUCATION/TRAINING PROGRAM

## 2021-02-13 PROCEDURE — 250N000009 HC RX 250: Performed by: NURSE PRACTITIONER

## 2021-02-13 PROCEDURE — 90935 HEMODIALYSIS ONE EVALUATION: CPT | Performed by: INTERNAL MEDICINE

## 2021-02-13 PROCEDURE — 250N000009 HC RX 250: Performed by: CLINICAL NURSE SPECIALIST

## 2021-02-13 PROCEDURE — 250N000012 HC RX MED GY IP 250 OP 636 PS 637: Performed by: INTERNAL MEDICINE

## 2021-02-13 PROCEDURE — 99232 SBSQ HOSP IP/OBS MODERATE 35: CPT | Performed by: INTERNAL MEDICINE

## 2021-02-13 PROCEDURE — 250N000011 HC RX IP 250 OP 636: Performed by: CLINICAL NURSE SPECIALIST

## 2021-02-13 PROCEDURE — 94640 AIRWAY INHALATION TREATMENT: CPT | Mod: 76

## 2021-02-13 PROCEDURE — 250N000011 HC RX IP 250 OP 636: Performed by: NURSE PRACTITIONER

## 2021-02-13 PROCEDURE — 90937 HEMODIALYSIS REPEATED EVAL: CPT

## 2021-02-13 PROCEDURE — 634N000001 HC RX 634: Performed by: CLINICAL NURSE SPECIALIST

## 2021-02-13 PROCEDURE — 85027 COMPLETE CBC AUTOMATED: CPT | Performed by: STUDENT IN AN ORGANIZED HEALTH CARE EDUCATION/TRAINING PROGRAM

## 2021-02-13 PROCEDURE — 87493 C DIFF AMPLIFIED PROBE: CPT | Performed by: STUDENT IN AN ORGANIZED HEALTH CARE EDUCATION/TRAINING PROGRAM

## 2021-02-13 PROCEDURE — 258N000003 HC RX IP 258 OP 636: Performed by: STUDENT IN AN ORGANIZED HEALTH CARE EDUCATION/TRAINING PROGRAM

## 2021-02-13 PROCEDURE — 999N001017 HC STATISTIC GLUCOSE BY METER IP

## 2021-02-13 PROCEDURE — 200N000002 HC R&B ICU UMMC

## 2021-02-13 PROCEDURE — 250N000009 HC RX 250: Performed by: STUDENT IN AN ORGANIZED HEALTH CARE EDUCATION/TRAINING PROGRAM

## 2021-02-13 PROCEDURE — 99291 CRITICAL CARE FIRST HOUR: CPT | Mod: GC | Performed by: INTERNAL MEDICINE

## 2021-02-13 PROCEDURE — 272N000078 HC NUTRITION PRODUCT INTERMEDIATE LITER

## 2021-02-13 PROCEDURE — 94003 VENT MGMT INPAT SUBQ DAY: CPT

## 2021-02-13 PROCEDURE — 94640 AIRWAY INHALATION TREATMENT: CPT

## 2021-02-13 PROCEDURE — 94668 MNPJ CHEST WALL SBSQ: CPT

## 2021-02-13 PROCEDURE — 999N000015 HC STATISTIC ARTERIAL MONITORING DAILY

## 2021-02-13 PROCEDURE — 258N000003 HC RX IP 258 OP 636: Performed by: CLINICAL NURSE SPECIALIST

## 2021-02-13 RX ORDER — HEPARIN SODIUM 1000 [USP'U]/ML
500 INJECTION, SOLUTION INTRAVENOUS; SUBCUTANEOUS CONTINUOUS
Status: DISCONTINUED | OUTPATIENT
Start: 2021-02-13 | End: 2021-02-13

## 2021-02-13 RX ORDER — HEPARIN SODIUM 1000 [USP'U]/ML
500 INJECTION, SOLUTION INTRAVENOUS; SUBCUTANEOUS
Status: COMPLETED | OUTPATIENT
Start: 2021-02-13 | End: 2021-02-13

## 2021-02-13 RX ORDER — METOPROLOL TARTRATE 1 MG/ML
5 INJECTION, SOLUTION INTRAVENOUS ONCE
Status: COMPLETED | OUTPATIENT
Start: 2021-02-13 | End: 2021-02-13

## 2021-02-13 RX ORDER — MIDODRINE HYDROCHLORIDE 5 MG/1
10 TABLET ORAL DAILY PRN
Status: DISCONTINUED | OUTPATIENT
Start: 2021-02-13 | End: 2021-02-25 | Stop reason: HOSPADM

## 2021-02-13 RX ORDER — LOPERAMIDE HCL 1 MG/7.5ML
2 SUSPENSION ORAL ONCE
Status: COMPLETED | OUTPATIENT
Start: 2021-02-13 | End: 2021-02-13

## 2021-02-13 RX ADMIN — INSULIN ASPART 1 UNITS: 100 INJECTION, SOLUTION INTRAVENOUS; SUBCUTANEOUS at 16:43

## 2021-02-13 RX ADMIN — ACETAMINOPHEN 325 MG: 325 TABLET, FILM COATED ORAL at 12:31

## 2021-02-13 RX ADMIN — ALBUTEROL SULFATE 2.5 MG: 2.5 SOLUTION RESPIRATORY (INHALATION) at 14:32

## 2021-02-13 RX ADMIN — ALBUTEROL SULFATE 2.5 MG: 2.5 SOLUTION RESPIRATORY (INHALATION) at 08:03

## 2021-02-13 RX ADMIN — SODIUM CHLORIDE 300 MG: 9 INJECTION, SOLUTION INTRAVENOUS at 07:54

## 2021-02-13 RX ADMIN — SODIUM CHLORIDE 250 ML: 9 INJECTION, SOLUTION INTRAVENOUS at 10:56

## 2021-02-13 RX ADMIN — ISODIUM CHLORIDE 3 ML: 0.03 SOLUTION RESPIRATORY (INHALATION) at 08:04

## 2021-02-13 RX ADMIN — NYSTATIN 1000000 UNITS: 500000 SUSPENSION ORAL at 16:26

## 2021-02-13 RX ADMIN — PREDNISONE 20 MG: 20 TABLET ORAL at 07:54

## 2021-02-13 RX ADMIN — NYSTATIN 1000000 UNITS: 500000 SUSPENSION ORAL at 11:11

## 2021-02-13 RX ADMIN — ISODIUM CHLORIDE 3 ML: 0.03 SOLUTION RESPIRATORY (INHALATION) at 20:25

## 2021-02-13 RX ADMIN — HYDROXYZINE HYDROCHLORIDE 10 MG: 10 TABLET ORAL at 12:31

## 2021-02-13 RX ADMIN — INSULIN ASPART 2 UNITS: 100 INJECTION, SOLUTION INTRAVENOUS; SUBCUTANEOUS at 12:32

## 2021-02-13 RX ADMIN — BUDESONIDE 0.5 MG: 0.5 INHALANT ORAL at 20:25

## 2021-02-13 RX ADMIN — Medication 10 MG: at 20:18

## 2021-02-13 RX ADMIN — Medication 1 PACKET: at 11:11

## 2021-02-13 RX ADMIN — LIDOCAINE HYDROCHLORIDE 0.5 ML: 10 INJECTION, SOLUTION EPIDURAL; INFILTRATION; INTRACAUDAL; PERINEURAL at 10:57

## 2021-02-13 RX ADMIN — HEPARIN SODIUM 5000 UNITS: 5000 INJECTION, SOLUTION INTRAVENOUS; SUBCUTANEOUS at 22:49

## 2021-02-13 RX ADMIN — TACROLIMUS 2 MG: 5 CAPSULE ORAL at 17:59

## 2021-02-13 RX ADMIN — HEPARIN SODIUM 500 UNITS: 1000 INJECTION INTRAVENOUS; SUBCUTANEOUS at 10:59

## 2021-02-13 RX ADMIN — ACETYLCYSTEINE 2 ML: 200 SOLUTION ORAL; RESPIRATORY (INHALATION) at 08:02

## 2021-02-13 RX ADMIN — ALBUTEROL SULFATE 2.5 MG: 2.5 SOLUTION RESPIRATORY (INHALATION) at 20:25

## 2021-02-13 RX ADMIN — Medication 5 ML: at 07:53

## 2021-02-13 RX ADMIN — NYSTATIN 1000000 UNITS: 500000 SUSPENSION ORAL at 20:17

## 2021-02-13 RX ADMIN — ACETAMINOPHEN 325 MG: 325 TABLET, FILM COATED ORAL at 07:54

## 2021-02-13 RX ADMIN — Medication 12.5 MG: at 20:18

## 2021-02-13 RX ADMIN — INSULIN ASPART 1 UNITS: 100 INJECTION, SOLUTION INTRAVENOUS; SUBCUTANEOUS at 08:22

## 2021-02-13 RX ADMIN — SULFAMETHOXAZOLE AND TRIMETHOPRIM 295 MG: 200; 40 SUSPENSION ORAL at 20:20

## 2021-02-13 RX ADMIN — LOPERAMIDE HCL 2 MG: 1 SOLUTION ORAL at 12:31

## 2021-02-13 RX ADMIN — HEPARIN SODIUM 5000 UNITS: 5000 INJECTION, SOLUTION INTRAVENOUS; SUBCUTANEOUS at 05:32

## 2021-02-13 RX ADMIN — METOPROLOL TARTRATE 5 MG: 5 INJECTION INTRAVENOUS at 17:59

## 2021-02-13 RX ADMIN — Medication 12.5 MG: at 16:23

## 2021-02-13 RX ADMIN — Medication 1 PACKET: at 20:17

## 2021-02-13 RX ADMIN — MIDODRINE HYDROCHLORIDE 10 MG: 5 TABLET ORAL at 05:32

## 2021-02-13 RX ADMIN — TACROLIMUS 2 MG: 5 CAPSULE ORAL at 07:53

## 2021-02-13 RX ADMIN — OLANZAPINE 10 MG: 2.5 TABLET ORAL at 20:17

## 2021-02-13 RX ADMIN — Medication 40 MG: at 07:53

## 2021-02-13 RX ADMIN — INSULIN GLARGINE 10 UNITS: 100 INJECTION, SOLUTION SUBCUTANEOUS at 08:23

## 2021-02-13 RX ADMIN — ACETYLCYSTEINE 2 ML: 200 SOLUTION ORAL; RESPIRATORY (INHALATION) at 20:25

## 2021-02-13 RX ADMIN — NYSTATIN 1000000 UNITS: 500000 SUSPENSION ORAL at 07:53

## 2021-02-13 RX ADMIN — SULFAMETHOXAZOLE AND TRIMETHOPRIM 295 MG: 200; 40 SUSPENSION ORAL at 11:10

## 2021-02-13 RX ADMIN — SODIUM CHLORIDE 300 ML: 9 INJECTION, SOLUTION INTRAVENOUS at 10:56

## 2021-02-13 RX ADMIN — BUDESONIDE 0.5 MG: 0.5 INHALANT ORAL at 08:03

## 2021-02-13 RX ADMIN — HEPARIN SODIUM 5000 UNITS: 5000 INJECTION, SOLUTION INTRAVENOUS; SUBCUTANEOUS at 16:26

## 2021-02-13 RX ADMIN — EPOETIN ALFA-EPBX 4000 UNITS: 10000 INJECTION, SOLUTION INTRAVENOUS; SUBCUTANEOUS at 10:58

## 2021-02-13 RX ADMIN — HEPARIN SODIUM 500 UNITS/HR: 1000 INJECTION INTRAVENOUS; SUBCUTANEOUS at 11:02

## 2021-02-13 ASSESSMENT — ACTIVITIES OF DAILY LIVING (ADL)
ADLS_ACUITY_SCORE: 18

## 2021-02-13 ASSESSMENT — MIFFLIN-ST. JEOR
SCORE: 1164.53
SCORE: 1165

## 2021-02-13 NOTE — PROGRESS NOTES
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  Patient was admitted to OSH on  for acute hypoxemic respiratory failure and new lung opacities. Intubated  and transferred to [facility] for ongoing management.  Extubated  but reintubated - for progressive hypoxemia.  Rapid decompensation 2/3 with ARDS presentation requiring reintubation, prone positioning, and inhaled epoprostenol.  Significant rise in leukocytosis (2/3) concerning for worsening PJP versus secondary infection. Now with improved oxygenation and PST this am.       Today's recommendations:  - Trach placement --tolerated well  - Continue aggressive pulmonary toilet with albuterol and Mucomyst   - Continue IV Posaconazole and check a level on Tuesday   - we will continue to manage immunosuppression  - IP to manage airway stenosis     Acute hypoxemic respiratory failure:   Right post-obstructive Stenotrophomonas and Eikenella pneumonia:  PJP pneumonia:  Septic shock:   Airway stenosis with distal plugging:  ARDS: Presented to OSH ED with increased SOB and hypoxia, initially requiring 4L NC but continued to decline and subsequently intubated .  Hemodynamic instability after intubation requiring pressors, transferred to [facility].  Afebrile but with leukocytosis.  COVID and respiratory panel negative.  PTA bronch (20) with moderate airway obstruction and RML/RLL mucous plugging, cultures with Stenotrophomonas and  Eikenella (IV ceftazidime 1/13-1/19).  OSH CT chest with new multifocal GGO bilaterally and increased left loculated pleural effusion.  Repeat bronch (1/24) with RUL BAL with thick copious secretions to RML/RLL, PJP PCR positive.  Extubated on 1/26, but reintubated from 1/27-1/29 for progressive hypoxemia.  Remained on either HFNC % FiO2 or BiPAP % FiO2, then eventually decompensated on 2/3 and again reintubated.  Working revealing for ARDS, proning protocol initiated (2/3) with inhaled epoprostenol, pressors initiated.  Significant rise in leukocytosis (2/3) concerning for worsening PJP vs secondary infection.  CT chest (2/3) with progressive consolidation (consistent with ARDS) and possible superimposed infection findings, stable LLL chronic empyema.  CRRT initiated 2/4 as below.  CT chest 2/10 with improvement improvement in aeration bilaterally.  Bronch 2/11 with airway stenosis at right anastomosis  - ABX per MICU: Bactrim (1/24) for PJP and Steno (noted 12/23) coverage, empiric Zosyn (2/3) end date 2/12  -bactrim for the PJP pneumonia, will need 21 day course, this will cover the Eikenella and Stenothrophomonas he had on BAL from 12/23/2021  - Prednisone 20 mg once daily for until 2/18  - Pulmonary toilet with chest physiotherapy QID and nebs: Albuterol QID and Mucomyst BID, and Pulmicort BID  - Ventilator and ARDS management per MICU     Aspergillus empyema (left-sided): First noted 10/8/19, negative in November and again on admission.  CT scan on 7/17/20 with increased mass-like density, likely pleural-based, in LLL area s/p needle aspiration (8/13/20) with Aspergillus fumigatus on cultures s/p intrapleural amphotericin bead placement (11/20).  Following with ID as OP.  LFTs stable on admission (ALP chronically mildly elevated).  CT chest with increased loculated left pleural effusion s/p thoracentesis (1/25), exudative with 87% neutrophils, very high LDL (6308), cytology normal.   - AFB pleural  cultures (1/25) NGTD  - PTA posaconazole, PTA plan for indefinite course; level 0.8 on 2/4 (prior level 1/26 therapeutic), changed to IV Posaconazole 2/9/2021 due to subtherapeutic level despite increasing the enteral Posaconazole, will check Posaconazole level 1/16     S/p bilateral lung transplant for ILD 2/2 CTD:   Right mainstem bronchial stenosis (s/p dilation 3/2019): Last seen in pulmonary clinic 12/2. DSA (1/25) not detected.   -  IP  To manage right bronchial stenosis     Immunosuppression: On 2 drug IST d/t recurrent infections  - Tacrolimus  Goal level 8-10, check level on 2/15  - Chronic prednisone dose on hold with above dose steroids (5 mg qAM / 2.5 mg qPM)     Prophylaxis:   - Bactrim treatment dose as above, PTA had not been on PJP ppx since 7/28/20 as CD4 > 200        H/o CMV viremia: CMV D+/R+.  CMV <137 (1/25).  - VGCV for CMV ppx with stress dose steroids (1/26)      EBV viremia: Level 12/9/20 mildly elevated to 10K (log 4) from prior 3K (3.5 log) on 9/9/20, repeat (1/25/21) decreased to 5619 copies (log of 3.8).  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 Chronic iHD M/Th via AVF with partial graft.  Not able to tolerate iHD 2/3 d/t hypotension, line placed and transitioned to CRRT 2/4. No back to iHD   - Monitoring of tacrolimus as above  - Dialysis management per nephrology     Atrial fibrillation w/ RVR: H/o paroxysmal AF, last 10/2019.  Recurrence 1/30 with RVR (-140s), likely in the setting of acute pulmonary illness.  - Management per MICU     Oropharyngeal candidiasis: White tongue plaque noted 2/1.  - Nystatin QID (2/1)      We appreciate the excellent care provided by the MICU team. Recommendations communicated via in person rounding and this note. Will continue to follow along closely, please do not hesitate to call with any questions or concerns.     Angelica Figueroa MD MPH   of  "Medicine  Pager [phone]    Subjective & Interval History:     Patient reports that trach went well.  Denies pain.  Feels breathing is easier.  She is able to manage her secretions.    Review of Systems:     C: No fever, no chills  ENT/MOUTH: trach in place, no nasal congestion or drainage  RESP: See interval history  CV: No chest pain, no peripheral edema  GI: No nausea, no vomiting, no change in stools, no reflux symptoms  : No urine  MUSCULOSKELETAL: No myalgias, no arthralgias  NEURO: No headache  PSYCHIATRIC: Mood stable    Physical Exam:     Vital signs:  Temp: 98  F (36.7  C) Temp src: Oral BP: (!) 144/66 Pulse: 146   Resp: (!) 32 SpO2: 94 % O2 Device: Mechanical Ventilator Oxygen Delivery: 60 LPM Height: 160 cm (5' 2.99\") Weight: 61.6 kg (135 lb 12.9 oz)  I/O:     Intake/Output Summary (Last 24 hours) at 2/13/2021 1231  Last data filed at 2/13/2021 1000  Gross per 24 hour   Intake 1753.91 ml   Output --   Net 1753.91 ml     Constitutional: lying in bed on vent, in no apparent distress.   HEENT: Eyes with pink conjunctivae, anicteric. Oral mucosa moist without lesions. With trach in place  PULM: Good air flow bilaterally. No crackles, no rhonchi, no wheezes  CV: Normal S1 and S2. Tachycardic RR. No murmur, gallop, or rub.    ABD: NABS, soft, nontender, nondistended.    MSK: Moves all extremities. + apparent muscle wasting.   NEURO: Alert and oriented, able to interact while on vent  SKIN: Warm, dry. No rash on limited exam.   PSYCH: Mood stable.     Lines, Drains, and Devices:  Arterial Line 02/04/21 Brachial (Active)   Site Assessment WDL 02/13/21 0800   Line Status Pulsatile blood flow 02/13/21 0800   Arterine Line Cap Change Due 02/16/21 02/12/21 2000   Art Line Waveform Appropriate 02/13/21 0800   Art Line Interventions Leveled;Connections checked and tightened;Flushed per protocol 02/13/21 0800   Color/Movement/Sensation Capillary refill less than 3 sec 02/13/21 0800   Line Necessity Yes, meets " criteria 02/13/21 0800   Dressing Type Transparent 02/13/21 0800   Dressing Status Clean, dry, intact 02/13/21 0800   Dressing Intervention Dressing changed/new dressing 02/07/21 0400   Dressing Change Due 02/14/21 02/13/21 0800   Number of days: 9       CVC Double Lumen 10/25/19 Right Internal jugular (Active)   Number of days: 477       Hemodialysis Vascular Access Arteriovenous fistula Left Arm (Active)   Site Assessment WDL except;Ecchymotic 02/13/21 0800   Cannulation Needle Size 15 02/11/21 1135   Dressing Intervention New dressing 02/11/21 1515   Dressing Status Clean, dry, intact 02/13/21 0800   Verification by x-ray Not applicable 02/09/21 1405   Hand Off Report Yes 02/11/21 1515   Number of days: 19       CVC TRIPLE LUMEN Right Internal jugular (Active)   Site Assessment WDL 02/13/21 0800   Dressing Type Chlorhexidine sponge;Transparent 02/13/21 0800   Dressing Status clean;dry;intact 02/13/21 0800   Dressing Intervention dressing changed 02/07/21 0400   Dressing Change Due 02/14/21 02/13/21 0800   Line Necessity yes, meets criteria 02/13/21 0800   Blue - Status infusing 02/13/21 0800   Blue - Cap Change Due 02/16/21 02/13/21 0800   Brown - Status infusing 02/13/21 0800   Brown - Cap Change Due 02/16/21 02/13/21 0800   White - Status saline locked 02/13/21 0800   White - Cap Change Due 02/08/21 02/08/21 0800   Phlebitis Scale 0-->no symptoms 02/13/21 0800   Infiltration? no 02/13/21 0800   Infiltration Scale 0 02/13/21 0800   Infiltration Site Treatment Method  None 02/11/21 1600   Was a vesicant infusing? no 02/11/21 1600   CVC Comment CDI 02/13/21 0800   Number of days: 20     Data:     LABS    CMP:   Recent Labs   Lab 02/13/21  0323 02/12/21  0315 02/11/21  0400 02/10/21  0420 02/09/21  1615 02/09/21  0403 02/08/21  0450 02/08/21  0450 02/07/21 0400 02/07/21  0400    135 136 136 135 137   < > 137   < > 137   POTASSIUM 4.2 4.1 5.0 4.4 4.3 4.4   < > 4.1   < > 4.5   CHLORIDE 100 100 103 102 102 105    < > 105   < > 106   CO2 26 28 25 27 28 27   < > 27   < > 26   ANIONGAP 8 7 7 7 5 5   < > 6   < > 6   * 175* 181* 198* 153* 139*   < > 146*   < > 118*   BUN 52* 33* 52* 32* 20 28   < > 21   < > 23   CR 2.59* 1.71* 2.53* 1.65* 1.08* 1.38*   < > 1.06*   < > 1.17*   GFRESTIMATED 20* 32* 20* 34* 56* 42*   < > 58*   < > 51*   GFRESTBLACK 23* 38* 23* 39* 65 49*   < > 67   < > 59*   CHELSEY 8.7 8.6 8.4* 8.6 8.3* 8.8   < > 8.8   < > 8.4*   MAG 2.4* 1.7 2.1 2.0  --  2.3   < > 2.4*   < > 2.6*   PHOS 5.3* 3.6 4.1 2.9  --  2.9   < > 2.8   < > 4.0   PROTTOTAL  --   --   --   --  6.1* 5.6*  --  6.1*  --  6.0*   ALBUMIN  --   --   --   --  2.0* 1.8*  --  2.0*  --  2.0*   BILITOTAL  --   --   --   --  0.4 0.3  --  0.4  --  0.4   ALKPHOS  --   --   --   --  218* 182*  --  178*  --  166*   AST  --   --   --   --  6 <3  --  4  --  3   ALT  --   --   --   --  32 28  --  29  --  31    < > = values in this interval not displayed.     CBC:   Recent Labs   Lab 02/13/21  0323 02/12/21  1545 02/12/21  0315 02/11/21  0645 02/11/21  0400   WBC 7.2  --  4.6 5.2 3.9*   RBC 2.52*  --  2.50* 2.47* 2.04*   HGB 7.8*  --  7.6* 7.1* 6.1*   HCT 23.9*  --  23.3* 22.6* 19.5*   MCV 95  --  93 92 96   MCH 31.0  --  30.4 28.7 29.9   MCHC 32.6  --  32.6 31.4* 31.3*   RDW 20.1*  --  19.9* 18.6* 18.8*   * 114* 86* 79* 83*       INR:   Recent Labs   Lab 02/11/21  0645   INR 1.07       Glucose:   Recent Labs   Lab 02/13/21  1140 02/13/21  0807 02/13/21  0324 02/13/21  0323 02/12/21  2331 02/12/21  2119 02/12/21  1542 02/12/21  0315 02/12/21  0315 02/11/21  0400 02/11/21  0400 02/10/21  0420 02/10/21  0420 02/09/21  1615 02/09/21  1615 02/09/21  0403 02/09/21  0403   GLC  --   --   --  142*  --   --   --   --  175*  --  181*  --  198*  --  153*  --  139*   * 154* 143*  --  168* 128* 229*   < > 173*   < >  --    < >  --    < >  --    < >  --     < > = values in this interval not displayed.       Blood Gas:   Recent Labs   Lab 02/10/21  2000  02/10/21  1555 02/09/21  1225   O2PER 50 50 60       Culture Data   Recent Labs   Lab 02/09/21  1657 02/09/21  1649   CULT No growth after 4 days No growth after 4 days       Virology Data:   Lab Results   Component Value Date    FLUAH1 Not Detected 01/24/2021    FLUAH3 Not Detected 01/24/2021    ED5776 Not Detected 01/24/2021    IFLUB Not Detected 01/24/2021    RSVA Not Detected 01/24/2021    RSVB Not Detected 01/24/2021    PIV1 Not Detected 01/24/2021    PIV2 Not Detected 01/24/2021    PIV3 Not Detected 01/24/2021    HMPV Not Detected 01/24/2021    HRVS Negative 01/24/2021    ADVBE Negative 01/24/2021    ADVC Negative 01/24/2021    ADVC Negative 12/23/2020    ADVC Negative 10/07/2019       Historical CMV results (last 3 of prior testing):  Lab Results   Component Value Date    CMVQNT <137 (A) 01/25/2021    CMVQNT 238 (A) 01/24/2021    CMVQNT 675 (A) 12/23/2020     Lab Results   Component Value Date    CMVLOG <2.1 01/25/2021    CMVLOG 2.4 (H) 01/24/2021    CMVLOG 2.8 (H) 12/23/2020       Urine Studies    Recent Labs   Lab Test 01/24/21  1729 10/21/19  2240   URINEPH 5.0 5.0   NITRITE Negative Negative   LEUKEST Moderate* Large*   WBCU 34* 115*       Most Recent Breeze Pulmonary Function Testing (FVC/FEV1 only)  FVC-Pre   Date Value Ref Range Status   12/09/2020 1.12 L    09/09/2020 1.56 L    03/09/2020 1.57 L    02/19/2020 1.78 L      FVC-%Pred-Pre   Date Value Ref Range Status   12/09/2020 34 %    09/09/2020 47 %    03/09/2020 48 %    02/19/2020 54 %      FEV1-Pre   Date Value Ref Range Status   12/09/2020 0.98 L    09/09/2020 1.10 L    03/09/2020 0.96 L    02/19/2020 0.99 L      FEV1-%Pred-Pre   Date Value Ref Range Status   12/09/2020 37 %    09/09/2020 42 %    03/09/2020 37 %    02/19/2020 38 %        IMAGING    Recent Results (from the past 48 hour(s))   XR Chest Port 1 View    Narrative    EXAM: XR CHEST PORT 1 VW  2/12/2021 11:17 AM     HISTORY:  post trach placement & line placement       COMPARISON:  Chest  x-ray and CT 2/9/2021    FINDINGS:   AP portable view of the chest. Tracheostomy tube tip projects in the  high thoracic trachea. Right IJ central venous catheter tip projects  in the superior cavoatrial junction. Enteric tube courses below the  diaphragm with tip outside the field-of-view.. Postoperative changes  with intact median sternotomy wires and mediastinal surgical clips.  Bowel clips projecting over the right axilla. Demonstration of diffuse  patchy pulmonary opacities, most pronounced at the lung bases.      Impression    IMPRESSION:   1. Tracheostomy tube tip projects in the thoracic trachea. Additional  support devices are stable.  2. No definite change in the diffuse mixed interstitial and airspace  opacities with likely bilateral pleural effusions.    I have personally reviewed the examination and initial interpretation  and I agree with the findings.    ADAM GONZALEZ MD

## 2021-02-13 NOTE — PLAN OF CARE
Procedure at bedside this morning, trach placement. Pt received Versed 14 mg, Fentanyl 600 mcg and Vecuronium 10 mg during the procedure. Pt has a Shiley #8 with trach supplies at bedside. Trach has sutures and trach ties. Small amount of bright red blood oozing, applied quick clot guaze and notified MICU Team. Held 18:00 dose of subcutaneous Heparin per MICU Team and will continue to monitor. SCD's on for DVT prevention.  AM platelets=86, pt received 1 unit platelets. Rechecked platelets=114.  Immediately before trach placement, MICU Team also replaced the right internal jugular CVC. Verified by xray.  Restarted TF at goal rate, tolerateing well. Large loose stool x 3 this shift.   Plan is to continue to monitor trach site for bleeding.

## 2021-02-13 NOTE — PROGRESS NOTES
HEMODIALYSIS TREATMENT NOTE    Date: 2/13/2021  Time: 3:11 PM    Data:  Pre Wt: 61.6 kg (135 lb 12.9 oz)   Desired Wt: 59.6 kg   Post Wt: 59.6 kg (131 lb 6.3 oz)  Weight change: 2 kg  Ultrafiltration - Post Run Net Total Removed (mL): 2000 mL  Vascular Access Status: patent  Dialyzer Rinse: Clear  Total Blood Volume Processed: 92.5 L Liters  Total Dialysis (Treatment) Time: 3.5 Hours    Lab:       Interventions:  3.5 hours of HD with 2000 mls with no complications. [id number]. 450 BFR via left upper fistula. Hand off to Adriana.    Assessment:  ESRD patient in ICU needing HD 2/2 fluid overload and chemistries. Patient VSS A+O     Plan:    Per renal

## 2021-02-13 NOTE — PROGRESS NOTES
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 Found to have PCP pneumonia.    CHANGES and MAJOR THINGS TODAY:   - Post tracheostomy 2/12    - Trach at bedside 2/12, plan for IR PEG-J tube placement on Tuesday ( NPO and HOLD heparin evening of 2/15)  - Weaning trail this morning   - iHD, if tolerates can pull A-line   - Change schedule bowel regimen to PRN  - Consider adding fiber on TF   - Off precedex since yesterday AM, less anxious   - Increase activity, up to chair, PT following    PLAN:  Neuro:  # Pain and sedation  - Weaned off Precedex post tracheostomy   - prn hydroxyzine for anxiety  - Oxycodone PRN     # Delirium  - Scheduled 10 mg at bedtime and PRN Zyprexa     # Insomnia  - Melatonin available prn    # Anisocoria (L>R)  Intermittently fixed and dilated pupils. CTH unremarkable. Neuro examination unremarkable.  - Opthomalogy evaluated on 2/1    Pulmonary:  # Acute Hypoxic Respiratory Failure  # Tracheostomy (2/12/21)  Suspected 2/2 PJP initially. OSH CT 1/23 showed bilateral ground glass opacities and consolidative lung infiltrates centrally distributed to the upper lobes and RLL. COVID negative x3 at outside hospital. Concern for secondary pneumonia given worsening clinical picture. 2/4 CT chest indicated worsening consolidations consistent with progression of pneumocystis with possibilty of superimposed secondary pneumonia. Previously on inhaled epoprostenol. S/p Perc tracheostomy 2/12 with interventional  pulm. Mechanical ventilation AC 18/320/+8/50% - dropped RR to 18 from 24; wean FiO2 to 40% and then PEEP to 5 after tracheostomy 2/12. Last dose of Zosyn 1/12.   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  - Positive PJP PCR on 1/28 with positive fungitel   - Continue PTA tacrolimus  - Prednisone taper, holding home dose for now  - Pulmonary lung transplant team consulted and following, appreciate recs - may need dilation and/or stent after acute illness has resolved    Cardiovascular:  # Shock, resolved  # Transient hypotension related to dialysis, sleep  Last ECHO 10/21/20 showed EF 60-65%, normal LV and RV function.   - OFF norepinephrine, might need intermittently    - Continue midodrine to 10 mg q8 hours    # Afib with RVR, resolved  - Continue to monitor    # Hx HTN  - Hold PTA carvedilol and amlodipine    GI/Nutrition:  # Portal HTN  Liver biopsy positive for congestive hepatopathy. Previous had ascites thought to be r/t elevated right sided heart pressures. Last saw GI on 12/21/20 and patient endorsed that her ascites is no longer present. Etiology unclear.   - NTD    #Nutrition  - NJT placed by radiology, continue TF  - PEG-J next week, tentatively scheduled for 2/16   NPO and HOLD heparin evening of 2/15  - Consider adding fiber on TF      # Constipation-resolved and now with loose stools  - Hx of constipation with bactrim, reports frequent BMs  - Change schedule bowel regimen to PRN, due to multiple loose stools     Renal/Fluids/Electrolytes:  # ESRD on iHD twice weekly(M/Th)  # Anion gap metabolic acidosis r/t ESRD, uremia, resolved  Outpatient  dry body weight 56.5 kg. CRRT 2/4 to 2/8. Last HD 2/11 for 2L off   - Nephrology following   - iHD today  - I&O  - Daily weights   - Trend BMP     # Hypomagnesemia- resolved  - Replace Mag x1    Endocrine:  # Hyperglycemia, steroid induced  - High intensity sliding scale insulin  - 10 units glargine daily    # Seronegative RA with Raynaud's   - NTD, monitor     ID:  # Sepsis in setting of PJP pneumonia, possible secondary pneumonia  Suspected related to PJP pneumonia. OSH CT 1/23 + bilateral ground glass opacities and consolidative lung infiltrates centrally distributed to the upper lobes and RLL. Covid-19 negative X3 at OSH. Procalcitonin negative. Urine strep, CMV, and RPP negative. Given acute and severe decompensation, concern for secondary pneumonia. 2/4 CT chest which displayed worsening consolidations consistent with progression of pneumocystis with the possibility of superimposed secondary pneumonia.  - PJP PCR positive, fungitell positive as well  - Continue treatment with bactrim, prednisone taper  - Zosyn 10-day course to be completed 2/12  - s/p thora 1/25, fluid exudative, negative w/u  - Weekly COVID test per hospital policy        # Chronic/Stable right aspergillus empyema   - Continue posaconazole, treatment dosing, will monitor QTc (431 on 2/11)  - Posaconazole level ordered for 2/16    Hematology:    # Anemia r/t renal disease, chronic stable   # Thrombocytopenia r/t critical illness  # Leukocytosis, infection/steroids  Takes Aranesp 25 mcg every four weeks, last dose 1/14, next scheduled was 2/11.  - Discussed with nephrology, will add replacement with HD run on 2/13     Musculoskeletal:  # Generalized weakness  - PT/OT  - Increase activity, up to chair, PT following    Skin:  # Dermatomyositis antitryptase syndrome   - Noted, NTD    General Cares/Prophylaxis:    DVT Prophylaxis: Heparin sq  GI Prophylaxis: PPI  Restraints: none  Family Communication:  updated at the bedside  Code Status:  full    Lines/tubes/drains:  - trach  - HD fistula left forearm  - CVC Left IJ  - Art line - Consider removing post iHD   - NJT    Disposition:  - Medical ICU     Patient seen and findings/plan discussed with medical ICU staff, Dr. Senait Quinones MD  Internal Medicine, PGY-1  [facility]  Pager: [phone]      Attending note:  Patient seen, examined and discussed with the Resident physicians. All data reviewed including vital signs, medications, laboratory studies and imaging.  I agree with the assessment and plan as outlined in the above note.  The patient remains critically ill with acute respiratory failure, s/p lung transplant complicated by right mainstem stenosis and pneumonia, severe deconditioning, and ESRD.  I personally adjusted the patient's ventilator to treat the acute respiratory failure and followed hemodynamics on dialysis.   Tolerated bedside tracheostomy well yesterday, no significant secretions on the bronchoscopy. Will initiate vent weans today, assess BP with HD. Adjust bowel regiment for diarrhea.  Completed course of antibiotics.     Total Critical Care time excluding time for procedures and teaching is 40 minutes.    Linda Sapp MD  [phone]      ====================================  INTERVAL HISTORY:   No acute events overnight. Patient reports feeling less anxiety overnight but did not sleep well.  Able to communicate. Having multiple loose stools, denies  pain or any other concerns.    OBJECTIVE:   1. VITAL SIGNS:   Temp:  [98  F (36.7  C)-98.4  F (36.9  C)] 98.4  F (36.9  C)  Pulse:  [] 129  Resp:  [23-28] 28  BP: (144)/(66) 144/66  MAP:  [66 mmHg-116 mmHg] 116 mmHg  Arterial Line BP: ()/() 148/143  FiO2 (%):  [45 %] 45 %  SpO2:  [92 %-100 %] 95 %  Ventilation Mode: CMV/AC  (Continuous Mandatory Ventilation/ Assist Control)  FiO2 (%): 45 %  Rate Set (breaths/minute): 18 breaths/min  Tidal Volume Set (mL): 320 mL  PEEP (cm H2O): 8 cmH2O  Oxygen  Concentration (%): 40 %  Resp: 28    2. INTAKE/ OUTPUT:   I/O last 3 completed shifts:  In: 2362.49 [I.V.:1032.49; NG/GT:370]  Out: -  Urine Mixed with stool, not about to monitor accurately per nursing staff    3. PHYSICAL EXAMINATION:  General: Laying in bed, intubated via trachostomy and alert  Neuro: eyes open, following commands, alert and oriented x4  Pulm/Resp: Coarse breath sounds bilaterally, breathing non-labored  CV: ST. Pulses intact. No edema.   Abdomen: Soft, non-distended, non-tender, bowel sounds active.   Incisions/Skin: Left AV fistula with thrill and bruit    4. LABS:   Arterial Blood Gases   Recent Labs   Lab 02/10/21  2000 02/10/21  1555 02/09/21  1225 02/09/21  0403   PH 7.41 7.36 7.38 7.42   PCO2 42 46* 49* 44   PO2 90 105 74* 116*   HCO3 26 26 29* 28     Complete Blood Count   Recent Labs   Lab 02/13/21  0323 02/12/21  1545 02/12/21  0315 02/11/21  0645 02/11/21  0400   WBC 7.2  --  4.6 5.2 3.9*   HGB 7.8*  --  7.6* 7.1* 6.1*   * 114* 86* 79* 83*     Basic Metabolic Panel  Recent Labs   Lab 02/13/21  0323 02/12/21  0315 02/11/21  0400 02/10/21  0420    135 136 136   POTASSIUM 4.2 4.1 5.0 4.4   CHLORIDE 100 100 103 102   CO2 26 28 25 27   BUN 52* 33* 52* 32*   CR 2.59* 1.71* 2.53* 1.65*   * 175* 181* 198*     Liver Function Tests  Recent Labs   Lab 02/11/21  0645 02/09/21  1615 02/09/21  0403 02/08/21  0450 02/07/21  0400   AST  --  6 <3 4 3   ALT  --  32 28 29 31   ALKPHOS  --  218* 182* 178* 166*   BILITOTAL  --  0.4 0.3 0.4 0.4   ALBUMIN  --  2.0* 1.8* 2.0* 2.0*   INR 1.07  --   --   --   --      Coagulation Profile  Recent Labs   Lab 02/12/21  0315 02/11/21  0645   INR  --  1.07   PTT 28  --        5. RADIOLOGY:   Recent Results (from the past 24 hour(s))   XR Chest Port 1 View    Narrative    EXAM: XR CHEST PORT 1 VW  2/12/2021 11:17 AM     HISTORY:  post trach placement & line placement       COMPARISON:  Chest x-ray and CT 2/9/2021    FINDINGS:   AP portable view of  the chest. Tracheostomy tube tip projects in the  high thoracic trachea. Right IJ central venous catheter tip projects  in the superior cavoatrial junction. Enteric tube courses below the  diaphragm with tip outside the field-of-view.. Postoperative changes  with intact median sternotomy wires and mediastinal surgical clips.  Bowel clips projecting over the right axilla. Demonstration of diffuse  patchy pulmonary opacities, most pronounced at the lung bases.      Impression    IMPRESSION:   1. Tracheostomy tube tip projects in the thoracic trachea. Additional  support devices are stable.  2. No definite change in the diffuse mixed interstitial and airspace  opacities with likely bilateral pleural effusions.    I have personally reviewed the examination and initial interpretation  and I agree with the findings.    ADAM GONZALEZ MD

## 2021-02-13 NOTE — PROGRESS NOTES
Nephrology dialysis note    This patient was seen and examined while on dialysis. Laboratory results and nurses' notes were reviewed.    No changes to management of volume, anemia, BMD, acidosis, or electrolytes.    Diagnosis - ESKD with respiratory failure/ARDS    Richard Pan MD  [phone]

## 2021-02-13 NOTE — PLAN OF CARE
Major Shift Events:      Neuro: Intact. Appropriately mouths words/responses.    Card: ST/Sinus dysrhythmia with frequent PACs 100-120s most of shift. Remains off pressors, still prescribed midodrine.     Resp: Frequent suctioning of scant/small tan secretions. CMV 45%/24/320/8PEEP    GI/: Loose BM x5 this shift with complaints of abdominal fullness, tolerating TF at 45mL/hr goal, no laxatives in last 48hrs. C-Diff PCR pending. Oliguric on dialysis.     Plan: Dialysis planned for today. Increase mobility as patient tolerates. Remove art-line if patient remains hemodynamically stable.     For vital signs and complete assessments, please see documentation flowsheets.

## 2021-02-14 ENCOUNTER — APPOINTMENT (OUTPATIENT)
Dept: PHYSICAL THERAPY | Facility: CLINIC | Age: 61
End: 2021-02-14
Attending: INTERNAL MEDICINE
Payer: COMMERCIAL

## 2021-02-14 LAB
ANION GAP SERPL CALCULATED.3IONS-SCNC: 7 MMOL/L (ref 3–14)
BUN SERPL-MCNC: 32 MG/DL (ref 7–30)
CALCIUM SERPL-MCNC: 8.6 MG/DL (ref 8.5–10.1)
CHLORIDE SERPL-SCNC: 100 MMOL/L (ref 94–109)
CO2 SERPL-SCNC: 29 MMOL/L (ref 20–32)
CREAT SERPL-MCNC: 1.84 MG/DL (ref 0.52–1.04)
ERYTHROCYTE [DISTWIDTH] IN BLOOD BY AUTOMATED COUNT: 21.2 % (ref 10–15)
GFR SERPL CREATININE-BSD FRML MDRD: 30 ML/MIN/{1.73_M2}
GLUCOSE BLDC GLUCOMTR-MCNC: 100 MG/DL (ref 70–99)
GLUCOSE BLDC GLUCOMTR-MCNC: 115 MG/DL (ref 70–99)
GLUCOSE BLDC GLUCOMTR-MCNC: 130 MG/DL (ref 70–99)
GLUCOSE BLDC GLUCOMTR-MCNC: 164 MG/DL (ref 70–99)
GLUCOSE BLDC GLUCOMTR-MCNC: 165 MG/DL (ref 70–99)
GLUCOSE SERPL-MCNC: 149 MG/DL (ref 70–99)
HCT VFR BLD AUTO: 24 % (ref 35–47)
HGB BLD-MCNC: 7.5 G/DL (ref 11.7–15.7)
MAGNESIUM SERPL-MCNC: 1.8 MG/DL (ref 1.6–2.3)
MCH RBC QN AUTO: 30.5 PG (ref 26.5–33)
MCHC RBC AUTO-ENTMCNC: 31.3 G/DL (ref 31.5–36.5)
MCV RBC AUTO: 98 FL (ref 78–100)
PHOSPHATE SERPL-MCNC: 3.6 MG/DL (ref 2.5–4.5)
PLATELET # BLD AUTO: 109 10E9/L (ref 150–450)
POTASSIUM SERPL-SCNC: 3.8 MMOL/L (ref 3.4–5.3)
RBC # BLD AUTO: 2.46 10E12/L (ref 3.8–5.2)
SODIUM SERPL-SCNC: 136 MMOL/L (ref 133–144)
WBC # BLD AUTO: 5.3 10E9/L (ref 4–11)

## 2021-02-14 PROCEDURE — 250N000011 HC RX IP 250 OP 636: Performed by: NURSE PRACTITIONER

## 2021-02-14 PROCEDURE — 999N001017 HC STATISTIC GLUCOSE BY METER IP

## 2021-02-14 PROCEDURE — 250N000013 HC RX MED GY IP 250 OP 250 PS 637: Performed by: STUDENT IN AN ORGANIZED HEALTH CARE EDUCATION/TRAINING PROGRAM

## 2021-02-14 PROCEDURE — 250N000009 HC RX 250: Performed by: NURSE PRACTITIONER

## 2021-02-14 PROCEDURE — 250N000012 HC RX MED GY IP 250 OP 636 PS 637: Performed by: INTERNAL MEDICINE

## 2021-02-14 PROCEDURE — 94668 MNPJ CHEST WALL SBSQ: CPT

## 2021-02-14 PROCEDURE — 250N000013 HC RX MED GY IP 250 OP 250 PS 637: Performed by: INTERNAL MEDICINE

## 2021-02-14 PROCEDURE — 80048 BASIC METABOLIC PNL TOTAL CA: CPT | Performed by: STUDENT IN AN ORGANIZED HEALTH CARE EDUCATION/TRAINING PROGRAM

## 2021-02-14 PROCEDURE — 250N000009 HC RX 250: Performed by: STUDENT IN AN ORGANIZED HEALTH CARE EDUCATION/TRAINING PROGRAM

## 2021-02-14 PROCEDURE — 85027 COMPLETE CBC AUTOMATED: CPT | Performed by: STUDENT IN AN ORGANIZED HEALTH CARE EDUCATION/TRAINING PROGRAM

## 2021-02-14 PROCEDURE — 97530 THERAPEUTIC ACTIVITIES: CPT | Mod: GP

## 2021-02-14 PROCEDURE — 94003 VENT MGMT INPAT SUBQ DAY: CPT

## 2021-02-14 PROCEDURE — 83735 ASSAY OF MAGNESIUM: CPT | Performed by: STUDENT IN AN ORGANIZED HEALTH CARE EDUCATION/TRAINING PROGRAM

## 2021-02-14 PROCEDURE — 94640 AIRWAY INHALATION TREATMENT: CPT | Mod: 76

## 2021-02-14 PROCEDURE — 94640 AIRWAY INHALATION TREATMENT: CPT

## 2021-02-14 PROCEDURE — 84100 ASSAY OF PHOSPHORUS: CPT | Performed by: STUDENT IN AN ORGANIZED HEALTH CARE EDUCATION/TRAINING PROGRAM

## 2021-02-14 PROCEDURE — 99291 CRITICAL CARE FIRST HOUR: CPT | Mod: GC | Performed by: INTERNAL MEDICINE

## 2021-02-14 PROCEDURE — 99232 SBSQ HOSP IP/OBS MODERATE 35: CPT | Performed by: INTERNAL MEDICINE

## 2021-02-14 PROCEDURE — 999N000157 HC STATISTIC RCP TIME EA 10 MIN

## 2021-02-14 PROCEDURE — 250N000013 HC RX MED GY IP 250 OP 250 PS 637: Performed by: NURSE PRACTITIONER

## 2021-02-14 PROCEDURE — 258N000003 HC RX IP 258 OP 636: Performed by: STUDENT IN AN ORGANIZED HEALTH CARE EDUCATION/TRAINING PROGRAM

## 2021-02-14 PROCEDURE — 200N000002 HC R&B ICU UMMC

## 2021-02-14 PROCEDURE — 97110 THERAPEUTIC EXERCISES: CPT | Mod: GP

## 2021-02-14 RX ORDER — GUAR GUM
1 PACKET (EA) ORAL DAILY
Status: DISCONTINUED | OUTPATIENT
Start: 2021-02-14 | End: 2021-02-21

## 2021-02-14 RX ORDER — METOPROLOL TARTRATE 25 MG/1
25 TABLET, FILM COATED ORAL 2 TIMES DAILY
Status: DISCONTINUED | OUTPATIENT
Start: 2021-02-14 | End: 2021-02-15

## 2021-02-14 RX ORDER — ONDANSETRON 2 MG/ML
4 INJECTION INTRAMUSCULAR; INTRAVENOUS EVERY 8 HOURS PRN
Status: DISCONTINUED | OUTPATIENT
Start: 2021-02-14 | End: 2021-02-25 | Stop reason: HOSPADM

## 2021-02-14 RX ORDER — LOPERAMIDE HCL 1 MG/7.5ML
2 SUSPENSION ORAL 3 TIMES DAILY PRN
Status: DISCONTINUED | OUTPATIENT
Start: 2021-02-14 | End: 2021-02-25 | Stop reason: HOSPADM

## 2021-02-14 RX ORDER — POLYETHYLENE GLYCOL 3350 17 G/17G
17 POWDER, FOR SOLUTION ORAL 2 TIMES DAILY PRN
Status: DISCONTINUED | OUTPATIENT
Start: 2021-02-14 | End: 2021-02-25 | Stop reason: HOSPADM

## 2021-02-14 RX ADMIN — SULFAMETHOXAZOLE AND TRIMETHOPRIM 295 MG: 200; 40 SUSPENSION ORAL at 09:53

## 2021-02-14 RX ADMIN — BUDESONIDE 0.5 MG: 0.5 INHALANT ORAL at 08:18

## 2021-02-14 RX ADMIN — ACETYLCYSTEINE 2 ML: 200 SOLUTION ORAL; RESPIRATORY (INHALATION) at 21:46

## 2021-02-14 RX ADMIN — INSULIN GLARGINE 10 UNITS: 100 INJECTION, SOLUTION SUBCUTANEOUS at 09:49

## 2021-02-14 RX ADMIN — LOPERAMIDE HCL 2 MG: 1 SOLUTION ORAL at 20:21

## 2021-02-14 RX ADMIN — ALBUTEROL SULFATE 2.5 MG: 2.5 SOLUTION RESPIRATORY (INHALATION) at 14:10

## 2021-02-14 RX ADMIN — NYSTATIN 1000000 UNITS: 500000 SUSPENSION ORAL at 09:50

## 2021-02-14 RX ADMIN — METOPROLOL TARTRATE 25 MG: 25 TABLET, FILM COATED ORAL at 09:51

## 2021-02-14 RX ADMIN — HEPARIN SODIUM 5000 UNITS: 5000 INJECTION, SOLUTION INTRAVENOUS; SUBCUTANEOUS at 21:58

## 2021-02-14 RX ADMIN — INSULIN ASPART 1 UNITS: 100 INJECTION, SOLUTION INTRAVENOUS; SUBCUTANEOUS at 09:49

## 2021-02-14 RX ADMIN — ACETYLCYSTEINE 2 ML: 200 SOLUTION ORAL; RESPIRATORY (INHALATION) at 08:18

## 2021-02-14 RX ADMIN — Medication 40 MG: at 09:50

## 2021-02-14 RX ADMIN — ALBUTEROL SULFATE 2.5 MG: 2.5 SOLUTION RESPIRATORY (INHALATION) at 08:18

## 2021-02-14 RX ADMIN — Medication 1 PACKET: at 09:53

## 2021-02-14 RX ADMIN — METOPROLOL TARTRATE 25 MG: 25 TABLET, FILM COATED ORAL at 20:21

## 2021-02-14 RX ADMIN — NYSTATIN 1000000 UNITS: 500000 SUSPENSION ORAL at 20:21

## 2021-02-14 RX ADMIN — TACROLIMUS 2 MG: 5 CAPSULE ORAL at 20:25

## 2021-02-14 RX ADMIN — ALBUTEROL SULFATE 2.5 MG: 2.5 SOLUTION RESPIRATORY (INHALATION) at 03:00

## 2021-02-14 RX ADMIN — TACROLIMUS 2 MG: 5 CAPSULE ORAL at 09:50

## 2021-02-14 RX ADMIN — INSULIN ASPART 2 UNITS: 100 INJECTION, SOLUTION INTRAVENOUS; SUBCUTANEOUS at 20:35

## 2021-02-14 RX ADMIN — NYSTATIN 1000000 UNITS: 500000 SUSPENSION ORAL at 14:33

## 2021-02-14 RX ADMIN — ISODIUM CHLORIDE 3 ML: 0.03 SOLUTION RESPIRATORY (INHALATION) at 08:18

## 2021-02-14 RX ADMIN — Medication 1 PACKET: at 20:23

## 2021-02-14 RX ADMIN — SODIUM CHLORIDE 300 MG: 9 INJECTION, SOLUTION INTRAVENOUS at 09:53

## 2021-02-14 RX ADMIN — ALBUTEROL SULFATE 2.5 MG: 2.5 SOLUTION RESPIRATORY (INHALATION) at 21:46

## 2021-02-14 RX ADMIN — HEPARIN SODIUM 5000 UNITS: 5000 INJECTION, SOLUTION INTRAVENOUS; SUBCUTANEOUS at 05:07

## 2021-02-14 RX ADMIN — OLANZAPINE 10 MG: 2.5 TABLET ORAL at 20:21

## 2021-02-14 RX ADMIN — HEPARIN SODIUM 5000 UNITS: 5000 INJECTION, SOLUTION INTRAVENOUS; SUBCUTANEOUS at 15:39

## 2021-02-14 RX ADMIN — BUDESONIDE 0.5 MG: 0.5 INHALANT ORAL at 21:46

## 2021-02-14 RX ADMIN — PREDNISONE 20 MG: 20 TABLET ORAL at 09:52

## 2021-02-14 RX ADMIN — SULFAMETHOXAZOLE AND TRIMETHOPRIM 295 MG: 200; 40 SUSPENSION ORAL at 20:21

## 2021-02-14 RX ADMIN — Medication 5 ML: at 09:50

## 2021-02-14 ASSESSMENT — ACTIVITIES OF DAILY LIVING (ADL)
ADLS_ACUITY_SCORE: 19
ADLS_ACUITY_SCORE: 17
ADLS_ACUITY_SCORE: 19
ADLS_ACUITY_SCORE: 19

## 2021-02-14 NOTE — PROGRESS NOTES
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  Patient was admitted to OSH on  for acute hypoxemic respiratory failure and new lung opacities. Intubated  and transferred to [facility] for ongoing management.  Extubated  but reintubated - for progressive hypoxemia.  Rapid decompensation 2/3 with ARDS presentation requiring reintubation, prone positioning, and inhaled epoprostenol.  Significant rise in leukocytosis (2/3) concerning for worsening PJP versus secondary infection. Now with improved oxygenation and doing PST.       Today's recommendations:  - Continue albuterol and Mucomyst  given distal mucus plugging on bronch this past week  - Continue IV Posaconazole and check a level on Tuesday    --consider transitioning to oral once gj tube placed  - we will continue to manage immunosuppression  - IP to manage airway stenosis     Acute hypoxemic respiratory failure:   Right post-obstructive Stenotrophomonas and Eikenella pneumonia:  PJP pneumonia:  Septic shock:   Airway stenosis with distal plugging:  ARDS: Presented to OSH ED with increased SOB and hypoxia, initially requiring 4L NC but continued to decline and subsequently intubated .  Hemodynamic instability after intubation requiring pressors, transferred to [facility].  Afebrile but with leukocytosis.  COVID and respiratory panel negative.  PTA bronch (20) with moderate airway obstruction and RML/RLL mucous plugging, cultures  with Stenotrophomonas and Eikenella (IV ceftazidime 1/13-1/19).  OSH CT chest with new multifocal GGO bilaterally and increased left loculated pleural effusion.  Repeat bronch (1/24) with RUL BAL with thick copious secretions to RML/RLL, PJP PCR positive.  Extubated on 1/26, but reintubated from 1/27-1/29 for progressive hypoxemia.  Remained on either HFNC % FiO2 or BiPAP % FiO2, then eventually decompensated on 2/3 and again reintubated.  Working revealing for ARDS, proning protocol initiated (2/3) with inhaled epoprostenol, pressors initiated.  Significant rise in leukocytosis (2/3) concerning for worsening PJP vs secondary infection.  CT chest (2/3) with progressive consolidation (consistent with ARDS) and possible superimposed infection findings, stable LLL chronic empyema.  CRRT initiated 2/4 as below.  CT chest 2/10 with improvement improvement in aeration bilaterally.  Bronch 2/11 with airway stenosis at right anastomosis  - ABX per MICU: Bactrim (1/24) for PJP and Steno (noted 12/23) coverage, empiric Zosyn (2/3-2/12)  -bactrim for the PJP pneumonia, will need 21 day course, this will cover the Eikenella and Stenothrophomonas he had on BAL from 12/23/2021  - Prednisone 20 mg once daily for until 2/18  - Chest physiotherapy QID and nebs: Albuterol QID and Mucomyst BID, and Pulmicort BID  - Ventilator management per MICU     Aspergillus empyema (left-sided): First noted 10/8/19, negative in November and again on admission.  CT scan on 7/17/20 with increased mass-like density, likely pleural-based, in LLL area s/p needle aspiration (8/13/20) with Aspergillus fumigatus on cultures s/p intrapleural amphotericin bead placement (11/20).  Following with ID as OP.  LFTs stable on admission (ALP chronically mildly elevated).  CT chest with increased loculated left pleural effusion s/p thoracentesis (1/25), exudative with 87% neutrophils, very high LDL (6308), cytology normal.   - AFB pleural cultures  (1/25) NGTD--stain negative  - PTA posaconazole, PTA plan for indefinite course.  Changed to IV Posaconazole 2/9/2021 due to subtherapeutic level despite increasing the enteral Posaconazole  --check Posaconazole level 2/16--ordered     S/p bilateral lung transplant for ILD 2/2 CTD:   Right mainstem bronchial stenosis (s/p dilation 3/2019): Last seen in pulmonary clinic 12/2. DSA (1/25) not detected.   -  IP to manage right bronchial stenosis     Immunosuppression: On 2 drug IST d/t recurrent infections  - Tacrolimus  Goal level 8-10, check level on 2/15  - Chronic prednisone dose on hold with above dose steroids (5 mg qAM / 2.5 mg qPM)  - anticipate transition on 2/18--will need to be taken off hold     Prophylaxis:   - Bactrim treatment dose as above, PTA had not been on PJP ppx since 7/28/20 as CD4 > 200     H/o CMV viremia: CMV D+/R+.  CMV <137 (1/25).  - VGCV for CMV ppx with stress dose steroids (1/26)      EBV viremia: Level 12/9/20 mildly elevated to 10K (log 4) from prior 3K (3.5 log) on 9/9/20, repeat (1/25/21) decreased to 5619 copies (log of 3.8).  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 Chronic iHD M/Th via AVF with partial graft.  Not able to tolerate iHD 2/3 d/t hypotension, line placed and transitioned to CRRT 2/4. Now back to iHD   - Monitoring of tacrolimus as above  - Dialysis management per nephrology     Atrial fibrillation w/ RVR: H/o paroxysmal AF, last 10/2019.  Recurrence 1/30 with RVR (-140s), likely in the setting of acute pulmonary illness.  - Management per MICU     Oropharyngeal candidiasis: White tongue plaque noted 2/1.  - Nystatin QID (2/1)      We appreciate the excellent care provided by the MICU team. Recommendations communicated via in person rounding and this note. Will continue to follow along closely, please do not hesitate to call with any questions or concerns.     Angelica Figueroa MD  "[facility]  Associate Professor of Medicine  Pager [phone]    Subjective & Interval History:     Patient reports that trach is sore but manageable.  Denies shortness of breath.  She is able to manage her secretions.    Review of Systems:     C: No fever, no chills  ENT/MOUTH: trach in place, no nasal congestion or drainage  RESP: See interval history  CV: No chest pain, no peripheral edema  GI: No nausea, no vomiting, no change in stools, no reflux symptoms  : No urine  MUSCULOSKELETAL: No myalgias, no arthralgias  NEURO: No headache  PSYCHIATRIC: Mood stable    Physical Exam:     Vital signs:  Temp: 98.9  F (37.2  C) Temp src: Oral BP: 125/57 Pulse: 129   Resp: 30 SpO2: 95 % O2 Device: Mechanical Ventilator Oxygen Delivery: 60 LPM Height: 160 cm (5' 2.99\") Weight: 61.6 kg (135 lb 12.9 oz)  I/O:     Intake/Output Summary (Last 24 hours) at 2/14/2021 1334  Last data filed at 2/14/2021 1100  Gross per 24 hour   Intake 1750 ml   Output 2000 ml   Net -250 ml     Constitutional: lying in bed on vent, in no apparent distress.   HEENT: Eyes with pink conjunctivae, anicteric. Oral mucosa moist without lesions. Trach in place  PULM: Good air flow bilaterally. No crackles, no rhonchi, no wheezes  CV: Normal S1 and S2. Tachycardic RR. No murmur, gallop, or rub.    ABD: NABS, soft, nontender, nondistended.    MSK: Moves all extremities. + apparent muscle wasting.   NEURO: Alert and oriented, able to interact while on vent  SKIN: Warm, dry. No rash on limited exam.   PSYCH: Mood stable.     Lines, Drains, and Devices:  Arterial Line 02/04/21 Brachial (Active)   Site Assessment WDL 02/13/21 0800   Line Status Pulsatile blood flow 02/13/21 0800   Arterine Line Cap Change Due 02/16/21 02/12/21 2000   Art Line Waveform Appropriate 02/13/21 0800   Art Line Interventions Leveled;Connections checked and tightened;Flushed per protocol 02/13/21 0800   Color/Movement/Sensation Capillary refill less than 3 sec 02/13/21 0800   Line " Necessity Yes, meets criteria 02/13/21 0800   Dressing Type Transparent 02/13/21 0800   Dressing Status Clean, dry, intact 02/13/21 0800   Dressing Intervention Dressing changed/new dressing 02/07/21 0400   Dressing Change Due 02/14/21 02/13/21 0800   Number of days: 9       CVC Double Lumen 10/25/19 Right Internal jugular (Active)   Number of days: 477       Hemodialysis Vascular Access Arteriovenous fistula Left Arm (Active)   Site Assessment WDL except;Ecchymotic 02/13/21 0800   Cannulation Needle Size 15 02/11/21 1135   Dressing Intervention New dressing 02/11/21 1515   Dressing Status Clean, dry, intact 02/13/21 0800   Verification by x-ray Not applicable 02/09/21 1405   Hand Off Report Yes 02/11/21 1515   Number of days: 19       CVC TRIPLE LUMEN Right Internal jugular (Active)   Site Assessment WDL 02/13/21 0800   Dressing Type Chlorhexidine sponge;Transparent 02/13/21 0800   Dressing Status clean;dry;intact 02/13/21 0800   Dressing Intervention dressing changed 02/07/21 0400   Dressing Change Due 02/14/21 02/13/21 0800   Line Necessity yes, meets criteria 02/13/21 0800   Blue - Status infusing 02/13/21 0800   Blue - Cap Change Due 02/16/21 02/13/21 0800   Brown - Status infusing 02/13/21 0800   Brown - Cap Change Due 02/16/21 02/13/21 0800   White - Status saline locked 02/13/21 0800   White - Cap Change Due 02/08/21 02/08/21 0800   Phlebitis Scale 0-->no symptoms 02/13/21 0800   Infiltration? no 02/13/21 0800   Infiltration Scale 0 02/13/21 0800   Infiltration Site Treatment Method  None 02/11/21 1600   Was a vesicant infusing? no 02/11/21 1600   CVC Comment CDI 02/13/21 0800   Number of days: 20     Data:     LABS    CMP:   Recent Labs   Lab 02/14/21  0336 02/13/21  0323 02/12/21  0315 02/11/21  0400 02/09/21  1615 02/09/21  1615 02/09/21  0403 02/08/21  0450 02/08/21  0450    134 135 136   < > 135 137   < > 137   POTASSIUM 3.8 4.2 4.1 5.0   < > 4.3 4.4   < > 4.1   CHLORIDE 100 100 100 103   < > 102  105   < > 105   CO2 29 26 28 25   < > 28 27   < > 27   ANIONGAP 7 8 7 7   < > 5 5   < > 6   * 142* 175* 181*   < > 153* 139*   < > 146*   BUN 32* 52* 33* 52*   < > 20 28   < > 21   CR 1.84* 2.59* 1.71* 2.53*   < > 1.08* 1.38*   < > 1.06*   GFRESTIMATED 30* 20* 32* 20*   < > 56* 42*   < > 58*   GFRESTBLACK 34* 23* 38* 23*   < > 65 49*   < > 67   CHELSEY 8.6 8.7 8.6 8.4*   < > 8.3* 8.8   < > 8.8   MAG 1.8 2.4* 1.7 2.1   < >  --  2.3   < > 2.4*   PHOS 3.6 5.3* 3.6 4.1   < >  --  2.9   < > 2.8   PROTTOTAL  --   --   --   --   --  6.1* 5.6*  --  6.1*   ALBUMIN  --   --   --   --   --  2.0* 1.8*  --  2.0*   BILITOTAL  --   --   --   --   --  0.4 0.3  --  0.4   ALKPHOS  --   --   --   --   --  218* 182*  --  178*   AST  --   --   --   --   --  6 <3  --  4   ALT  --   --   --   --   --  32 28  --  29    < > = values in this interval not displayed.     CBC:   Recent Labs   Lab 02/14/21  0336 02/13/21  0323 02/12/21  1545 02/12/21  0315 02/11/21  0645   WBC 5.3 7.2  --  4.6 5.2   RBC 2.46* 2.52*  --  2.50* 2.47*   HGB 7.5* 7.8*  --  7.6* 7.1*   HCT 24.0* 23.9*  --  23.3* 22.6*   MCV 98 95  --  93 92   MCH 30.5 31.0  --  30.4 28.7   MCHC 31.3* 32.6  --  32.6 31.4*   RDW 21.2* 20.1*  --  19.9* 18.6*   * 114* 114* 86* 79*       INR:   Recent Labs   Lab 02/11/21  0645   INR 1.07       Glucose:   Recent Labs   Lab 02/14/21  0947 02/14/21  0336 02/14/21  0335 02/14/21  0051 02/13/21  2030 02/13/21  1643 02/13/21  1140 02/13/21  0323 02/13/21  0323 02/12/21  0315 02/12/21  0315 02/11/21  0400 02/11/21  0400 02/10/21  0420 02/10/21  0420 02/09/21  1615 02/09/21  1615   GLC  --  149*  --   --   --   --   --   --  142*  --  175*  --  181*  --  198*  --  153*   *  --  130* 115* 137* 153* 185*   < >  --    < > 173*   < >  --    < >  --    < >  --     < > = values in this interval not displayed.       Blood Gas:   Recent Labs   Lab 02/10/21  2000 02/10/21  1555 02/09/21  1225   O2PER 50 50 60       Culture Data   Recent  Labs   Lab 02/09/21  1657 02/09/21  1649   CULT No growth after 5 days No growth after 5 days       Virology Data:   Lab Results   Component Value Date    FLUAH1 Not Detected 01/24/2021    FLUAH3 Not Detected 01/24/2021    IU0300 Not Detected 01/24/2021    IFLUB Not Detected 01/24/2021    RSVA Not Detected 01/24/2021    RSVB Not Detected 01/24/2021    PIV1 Not Detected 01/24/2021    PIV2 Not Detected 01/24/2021    PIV3 Not Detected 01/24/2021    HMPV Not Detected 01/24/2021    HRVS Negative 01/24/2021    ADVBE Negative 01/24/2021    ADVC Negative 01/24/2021    ADVC Negative 12/23/2020    ADVC Negative 10/07/2019       Historical CMV results (last 3 of prior testing):  Lab Results   Component Value Date    CMVQNT <137 (A) 01/25/2021    CMVQNT 238 (A) 01/24/2021    CMVQNT 675 (A) 12/23/2020     Lab Results   Component Value Date    CMVLOG <2.1 01/25/2021    CMVLOG 2.4 (H) 01/24/2021    CMVLOG 2.8 (H) 12/23/2020       Urine Studies    Recent Labs   Lab Test 01/24/21  1729 10/21/19  2240   URINEPH 5.0 5.0   NITRITE Negative Negative   LEUKEST Moderate* Large*   WBCU 34* 115*       Most Recent Breeze Pulmonary Function Testing (FVC/FEV1 only)  FVC-Pre   Date Value Ref Range Status   12/09/2020 1.12 L    09/09/2020 1.56 L    03/09/2020 1.57 L    02/19/2020 1.78 L      FVC-%Pred-Pre   Date Value Ref Range Status   12/09/2020 34 %    09/09/2020 47 %    03/09/2020 48 %    02/19/2020 54 %      FEV1-Pre   Date Value Ref Range Status   12/09/2020 0.98 L    09/09/2020 1.10 L    03/09/2020 0.96 L    02/19/2020 0.99 L      FEV1-%Pred-Pre   Date Value Ref Range Status   12/09/2020 37 %    09/09/2020 42 %    03/09/2020 37 %    02/19/2020 38 %        IMAGING    Recent Results (from the past 48 hour(s))   XR Chest Port 1 View    Narrative    EXAM: XR CHEST PORT 1 VW  2/12/2021 11:17 AM     HISTORY:  post trach placement & line placement       COMPARISON:  Chest x-ray and CT 2/9/2021    FINDINGS:   AP portable view of the chest.  Tracheostomy tube tip projects in the  high thoracic trachea. Right IJ central venous catheter tip projects  in the superior cavoatrial junction. Enteric tube courses below the  diaphragm with tip outside the field-of-view.. Postoperative changes  with intact median sternotomy wires and mediastinal surgical clips.  Bowel clips projecting over the right axilla. Demonstration of diffuse  patchy pulmonary opacities, most pronounced at the lung bases.      Impression    IMPRESSION:   1. Tracheostomy tube tip projects in the thoracic trachea. Additional  support devices are stable.  2. No definite change in the diffuse mixed interstitial and airspace  opacities with likely bilateral pleural effusions.    I have personally reviewed the examination and initial interpretation  and I agree with the findings.    ADAM GONZALEZ MD

## 2021-02-14 NOTE — PLAN OF CARE
PS trial today x 1 for 1 hour. Tolerated well. HD run for 2 kg fluid removal, tolerated well. Pt worked with PT today and was up to the chair for 2 hours. Pt had 2 loose stools this shift (9 loose stools overnight), so imodium ws given x 1 dose. Pt had a medium void x 1 today.  PRN atarax given x 1 for anxiety. Mild pain at trach site, managed with PRN Tylenol.  Plan is for PS trial x 2 tomorrow and PT/OT.

## 2021-02-14 NOTE — PLAN OF CARE
Major Shift Events:     Neuro: Intact    Card: Sinus arhythmia/sinus tachycardia 110-130, receiving PO 12.5mg metoprolol with little change in rate. BP normotensive, midodrine held all night.     Resp: CMV overnight 40%/24/320/8    GI/: Tolerating TF at 45mL/hr goal. Loose BM x2. Hemodialysis patient, no urine overnight.     Plan: CVC and Art Line no longer indicated, per NOC MD wait until days to remove, recommend pulling lines for infection prevention. Increase mobility.     For vital signs and complete assessments, please see documentation flowsheets.

## 2021-02-14 NOTE — PROGRESS NOTES
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 Found to have PCP pneumonia.    CHANGES and MAJOR THINGS TODAY:   - Trach at bedside 2/12, plan for IR PEG-J tube placement on Tuesday ( NPO and HOLD heparin evening of 2/15)  - Continue weaning trail BID, PS goal 5/5   - Increase activity, up to chair, PT following  - Metoprolol 12.5mg BID, increase dose to 25mg BID  - D/c midodrine to 10 mg q8 hours, d/c PRN oxycodone  - Last dose of Bactrim 21 days today,  switch to ppx dose  2/15    PLAN:  Neuro:  # Pain and sedation  - Off Precedex post tracheostomy   - prn hydroxyzine for anxiety  - Oxycodone PRN discontinued     # Delirium  - Scheduled 10 mg at bedtime and PRN Zyprexa     # Insomnia  - Melatonin available prn    # Anisocoria (L>R)  Intermittently fixed and dilated pupils. CTH unremarkable. Neuro examination unremarkable.  - Opthomalogy evaluated on 2/1    Pulmonary:  # Acute Hypoxic Respiratory Failure  # Tracheostomy (2/12/21)  Suspected 2/2 PJP initially. OSH CT 1/23 showed bilateral ground glass opacities and consolidative lung infiltrates centrally distributed to the upper lobes and RLL. COVID negative x3 at outside hospital. Concern for secondary pneumonia given worsening clinical picture. 2/4 CT chest indicated worsening consolidations consistent with progression of pneumocystis with possibilty of superimposed secondary pneumonia. Previously on inhaled epoprostenol. S/p Perc tracheostomy 2/12 with interventional  pulm. Mechanical ventilation AC 18/320/+8/50% - dropped RR to 18 from 24; wean FiO2 to 40% and then PEEP to 5 after tracheostomy 2/12. Last dose of Zosyn 1/12.   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  - Positive PJP PCR on 1/28 with positive fungitel   - Continue PTA tacrolimus  - Prednisone taper, holding home dose for now  - Pulmonary lung transplant team consulted and following, appreciate recs - may need dilation and/or stent after acute illness has resolved    Cardiovascular:  # Shock, resolved  # Transient hypotension related to dialysis, sleep- Resolved  Last ECHO 10/21/20 showed EF 60-65%, normal LV and RV function.   - OFF norepinephrine  - D/c midodrine to 10 mg q8 hours    # Afib with RVR, resolved  # ST  Intermittently going in and out of A-fib, currently ST.  - Continue to monitor  - Metoprolol 12.5mg BID, increase dose to 25mg BID    # Hx HTN  - Hold PTA carvedilol and amlodipine    GI/Nutrition:  # Portal HTN  Liver biopsy positive for congestive hepatopathy. Previous had ascites thought to be r/t elevated right sided heart pressures. Last saw GI on 12/21/20 and patient endorsed that her ascites is no longer present. Etiology unclear.   - NTD    #Nutrition  - NJT placed by radiology, continue TF  - PEG-J next week, tentatively scheduled for 2/16   NPO and HOLD heparin evening of 2/15  - Consider adding fiber on TF      # Constipation-resolved and now with loose stools  - Hx of constipation with bactrim, reports frequent BMs  - Change schedule bowel regimen to PRN, due to multiple loose stools      Renal/Fluids/Electrolytes:  # ESRD on iHD twice weekly(M/Th)  # Anion gap metabolic acidosis r/t ESRD, uremia, resolved  Outpatient dry body weight 56.5 kg. CRRT 2/4 to 2/8. Last HD 2/11 for 2L off   - Nephrology following   - iHD today  - I&O  - Daily weights   - Trend BMP     # Hypomagnesemia- resolved  - Replace Mag x1    Endocrine:  # Hyperglycemia, steroid induced  - High intensity sliding scale insulin  - 10 units glargine daily    # Seronegative RA with Raynaud's   - NTD, monitor     ID:  # Sepsis in setting of PJP pneumonia, possible secondary pneumonia  Suspected related to PJP pneumonia. OSH CT 1/23 + bilateral ground glass opacities and consolidative lung infiltrates centrally distributed to the upper lobes and RLL. Covid-19 negative X3 at OSH. Procalcitonin negative. Urine strep, CMV, and RPP negative. Given acute and severe decompensation, concern for secondary pneumonia. 2/4 CT chest which displayed worsening consolidations consistent with progression of pneumocystis with the possibility of superimposed secondary pneumonia.  - PJP PCR positive, fungitell positive as well  - Last dose of Bactrim 21 days today,  switch to ppx dose  2/15; prednisone taper  - Zosyn 10-day course  completed on 2/12  - s/p thora 1/25, fluid exudative, negative w/u  - Weekly COVID test per hospital policy, next 2/19    # Chronic/Stable right aspergillus empyema   - Continue posaconazole, treatment dosing, will monitor QTc intermittently (431 on 2/11)  - Posaconazole level ordered for 2/16    Hematology:    # Anemia r/t renal disease, chronic stable   # Thrombocytopenia r/t critical illness  # Leukocytosis, infection/steroids  Takes Aranesp 25 mcg every four weeks, last dose 1/14, next scheduled was 2/11.  - Discussed with nephrology, will add replacement with HD run on 2/13   -iHD 2/13, 2L removed tolerated    Musculoskeletal:  # Generalized weakness  - PT/OT  - Increase activity, up to chair, PT  following    Skin:  # Dermatomyositis antitryptase syndrome   - Noted, NTD    General Cares/Prophylaxis:    DVT Prophylaxis: Heparin sq  GI Prophylaxis: PPI  Restraints: none  Family Communication:  updated at the bedside  Code Status: full    Lines/tubes/drains:  - trach  - HD fistula left forearm  - CVC Left IJ  - Art line - to be removed 2/14  - NJT    Disposition:  - Medical ICU     Patient seen and findings/plan discussed with medical ICU staff, Dr. Senait Quinones MD  Internal Medicine, PGY-1  [facility]  Pager: [phone]    Attending note:  Patient seen, examined and discussed with the Resident physicians. All data reviewed including vital signs, medications, laboratory studies and imaging.  I agree with the assessment and plan as outlined in the above note.  The patient remains critically ill with acute respiratory failure, s/p lung transplant complicated by right mainstem stenosis and pneumonia, severe deconditioning, and ESRD.  I personally adjusted the patient's ventilator to treat the acute respiratory failure and followed hemodynamics on dialysis.   Tolerated bedside tracheostomy well, no significant secretions on the bronchoscopy.  Tolerated short duration vent wean yesterday. BP ok on HD, still tachycardic- will increase metoprolol.      Total Critical Care time excluding time for procedures and teaching is 40 minutes.     Linda Sapp MD  [phone]  ====================================  INTERVAL HISTORY:   No acute events overnight. Improved loose stools, denies  pain or any other concerns.    OBJECTIVE:   1. VITAL SIGNS:   Temp:  [98  F (36.7  C)-98.8  F (37.1  C)] 98.3  F (36.8  C)  Pulse:  [107-151] 135  Resp:  [24-32] 24  BP: (154)/(98) 154/98  MAP:  [71 mmHg-121 mmHg] 81 mmHg  Arterial Line BP: ()/(50-87) 116/53  FiO2 (%):  [40 %] 40 %  SpO2:  [91 %-99 %] 92 %  Ventilation Mode: CMV/AC  (Continuous Mandatory Ventilation/ Assist Control)  FiO2 (%): 40 %  Rate  Set (breaths/minute): 18 breaths/min  Tidal Volume Set (mL): 320 mL  PEEP (cm H2O): 5 cmH2O  Pressure Support (cm H2O): 5 cmH2O  Oxygen Concentration (%): 40 %  Resp: 24    2. INTAKE/ OUTPUT:   I/O last 3 completed shifts:  In: 1880 [I.V.:470; NG/GT:330]  Out: 2000 [Other:2000] Urine Mixed with stool, not about to monitor accurately per nursing staff    3. PHYSICAL EXAMINATION:  General: Laying in bed, intubated via trachostomy and alert  Neuro: eyes open, following commands, alert and oriented x4  Pulm/Resp: Coarse breath sounds bilaterally, breathing non-labored  CV: ST. Pulses intact. No edema.   Abdomen: Soft, non-distended, non-tender, bowel sounds active.   Incisions/Skin: Left AV fistula with thrill and bruit    4. LABS:   Arterial Blood Gases   Recent Labs   Lab 02/10/21  2000 02/10/21  1555 02/09/21  1225 02/09/21  0403   PH 7.41 7.36 7.38 7.42   PCO2 42 46* 49* 44   PO2 90 105 74* 116*   HCO3 26 26 29* 28     Complete Blood Count   Recent Labs   Lab 02/14/21  0336 02/13/21  0323 02/12/21  1545 02/12/21  0315 02/11/21  0645   WBC 5.3 7.2  --  4.6 5.2   HGB 7.5* 7.8*  --  7.6* 7.1*   * 114* 114* 86* 79*     Basic Metabolic Panel  Recent Labs   Lab 02/14/21  0336 02/13/21  0323 02/12/21  0315 02/11/21  0400    134 135 136   POTASSIUM 3.8 4.2 4.1 5.0   CHLORIDE 100 100 100 103   CO2 29 26 28 25   BUN 32* 52* 33* 52*   CR 1.84* 2.59* 1.71* 2.53*   * 142* 175* 181*     Liver Function Tests  Recent Labs   Lab 02/11/21  0645 02/09/21  1615 02/09/21  0403 02/08/21  0450   AST  --  6 <3 4   ALT  --  32 28 29   ALKPHOS  --  218* 182* 178*   BILITOTAL  --  0.4 0.3 0.4   ALBUMIN  --  2.0* 1.8* 2.0*   INR 1.07  --   --   --      Coagulation Profile  Recent Labs   Lab 02/12/21  0315 02/11/21  0645   INR  --  1.07   PTT 28  --        5. RADIOLOGY:   No results found for this or any previous visit (from the past 24 hour(s)).

## 2021-02-15 ENCOUNTER — APPOINTMENT (OUTPATIENT)
Dept: PHYSICAL THERAPY | Facility: CLINIC | Age: 61
End: 2021-02-15
Attending: INTERNAL MEDICINE
Payer: COMMERCIAL

## 2021-02-15 LAB
ABO + RH BLD: NORMAL
ABO + RH BLD: NORMAL
ANION GAP SERPL CALCULATED.3IONS-SCNC: 8 MMOL/L (ref 3–14)
BACTERIA SPEC CULT: NO GROWTH
BACTERIA SPEC CULT: NO GROWTH
BLD GP AB SCN SERPL QL: NORMAL
BLD PROD TYP BPU: NORMAL
BLD PROD TYP BPU: NORMAL
BLD UNIT ID BPU: 0
BLOOD BANK CMNT PATIENT-IMP: NORMAL
BLOOD PRODUCT CODE: NORMAL
BPU ID: NORMAL
BUN SERPL-MCNC: 52 MG/DL (ref 7–30)
CALCIUM SERPL-MCNC: 8.6 MG/DL (ref 8.5–10.1)
CHLORIDE SERPL-SCNC: 100 MMOL/L (ref 94–109)
CO2 SERPL-SCNC: 27 MMOL/L (ref 20–32)
CREAT SERPL-MCNC: 2.75 MG/DL (ref 0.52–1.04)
ERYTHROCYTE [DISTWIDTH] IN BLOOD BY AUTOMATED COUNT: 21.8 % (ref 10–15)
GFR SERPL CREATININE-BSD FRML MDRD: 18 ML/MIN/{1.73_M2}
GLUCOSE BLDC GLUCOMTR-MCNC: 108 MG/DL (ref 70–99)
GLUCOSE BLDC GLUCOMTR-MCNC: 116 MG/DL (ref 70–99)
GLUCOSE BLDC GLUCOMTR-MCNC: 161 MG/DL (ref 70–99)
GLUCOSE BLDC GLUCOMTR-MCNC: 163 MG/DL (ref 70–99)
GLUCOSE BLDC GLUCOMTR-MCNC: 175 MG/DL (ref 70–99)
GLUCOSE BLDC GLUCOMTR-MCNC: 73 MG/DL (ref 70–99)
GLUCOSE SERPL-MCNC: 155 MG/DL (ref 70–99)
HCT VFR BLD AUTO: 21.9 % (ref 35–47)
HGB BLD-MCNC: 6.7 G/DL (ref 11.7–15.7)
HGB BLD-MCNC: 8.4 G/DL (ref 11.7–15.7)
MAGNESIUM SERPL-MCNC: 1.9 MG/DL (ref 1.6–2.3)
MCH RBC QN AUTO: 29.8 PG (ref 26.5–33)
MCHC RBC AUTO-ENTMCNC: 30.6 G/DL (ref 31.5–36.5)
MCV RBC AUTO: 97 FL (ref 78–100)
NUM BPU REQUESTED: 1
PHOSPHATE SERPL-MCNC: 4.5 MG/DL (ref 2.5–4.5)
PLATELET # BLD AUTO: 116 10E9/L (ref 150–450)
POTASSIUM SERPL-SCNC: 3.9 MMOL/L (ref 3.4–5.3)
RBC # BLD AUTO: 2.25 10E12/L (ref 3.8–5.2)
SODIUM SERPL-SCNC: 136 MMOL/L (ref 133–144)
SPECIMEN EXP DATE BLD: NORMAL
SPECIMEN SOURCE: NORMAL
SPECIMEN SOURCE: NORMAL
TACROLIMUS BLD-MCNC: 7.3 UG/L (ref 5–15)
TME LAST DOSE: NORMAL H
TRANSFUSION STATUS PATIENT QL: NORMAL
TRANSFUSION STATUS PATIENT QL: NORMAL
WBC # BLD AUTO: 3.7 10E9/L (ref 4–11)

## 2021-02-15 PROCEDURE — 250N000009 HC RX 250: Performed by: INTERNAL MEDICINE

## 2021-02-15 PROCEDURE — 99232 SBSQ HOSP IP/OBS MODERATE 35: CPT | Mod: GC | Performed by: INTERNAL MEDICINE

## 2021-02-15 PROCEDURE — 86923 COMPATIBILITY TEST ELECTRIC: CPT | Performed by: INTERNAL MEDICINE

## 2021-02-15 PROCEDURE — 250N000011 HC RX IP 250 OP 636: Performed by: INTERNAL MEDICINE

## 2021-02-15 PROCEDURE — 94668 MNPJ CHEST WALL SBSQ: CPT

## 2021-02-15 PROCEDURE — 99291 CRITICAL CARE FIRST HOUR: CPT | Mod: GC | Performed by: INTERNAL MEDICINE

## 2021-02-15 PROCEDURE — 250N000011 HC RX IP 250 OP 636: Performed by: NURSE PRACTITIONER

## 2021-02-15 PROCEDURE — 90937 HEMODIALYSIS REPEATED EVAL: CPT

## 2021-02-15 PROCEDURE — 250N000013 HC RX MED GY IP 250 OP 250 PS 637: Performed by: INTERNAL MEDICINE

## 2021-02-15 PROCEDURE — 83735 ASSAY OF MAGNESIUM: CPT | Performed by: STUDENT IN AN ORGANIZED HEALTH CARE EDUCATION/TRAINING PROGRAM

## 2021-02-15 PROCEDURE — 97530 THERAPEUTIC ACTIVITIES: CPT | Mod: GP

## 2021-02-15 PROCEDURE — 999N000157 HC STATISTIC RCP TIME EA 10 MIN

## 2021-02-15 PROCEDURE — 80048 BASIC METABOLIC PNL TOTAL CA: CPT | Performed by: STUDENT IN AN ORGANIZED HEALTH CARE EDUCATION/TRAINING PROGRAM

## 2021-02-15 PROCEDURE — 250N000009 HC RX 250: Performed by: NURSE PRACTITIONER

## 2021-02-15 PROCEDURE — 94640 AIRWAY INHALATION TREATMENT: CPT

## 2021-02-15 PROCEDURE — 250N000009 HC RX 250: Performed by: STUDENT IN AN ORGANIZED HEALTH CARE EDUCATION/TRAINING PROGRAM

## 2021-02-15 PROCEDURE — 94640 AIRWAY INHALATION TREATMENT: CPT | Mod: 76

## 2021-02-15 PROCEDURE — 250N000013 HC RX MED GY IP 250 OP 250 PS 637: Performed by: NURSE PRACTITIONER

## 2021-02-15 PROCEDURE — 250N000013 HC RX MED GY IP 250 OP 250 PS 637: Performed by: STUDENT IN AN ORGANIZED HEALTH CARE EDUCATION/TRAINING PROGRAM

## 2021-02-15 PROCEDURE — 250N000012 HC RX MED GY IP 250 OP 636 PS 637: Performed by: INTERNAL MEDICINE

## 2021-02-15 PROCEDURE — 84100 ASSAY OF PHOSPHORUS: CPT | Performed by: STUDENT IN AN ORGANIZED HEALTH CARE EDUCATION/TRAINING PROGRAM

## 2021-02-15 PROCEDURE — 94003 VENT MGMT INPAT SUBQ DAY: CPT

## 2021-02-15 PROCEDURE — 99233 SBSQ HOSP IP/OBS HIGH 50: CPT | Performed by: INTERNAL MEDICINE

## 2021-02-15 PROCEDURE — 258N000003 HC RX IP 258 OP 636: Performed by: STUDENT IN AN ORGANIZED HEALTH CARE EDUCATION/TRAINING PROGRAM

## 2021-02-15 PROCEDURE — 85027 COMPLETE CBC AUTOMATED: CPT | Performed by: STUDENT IN AN ORGANIZED HEALTH CARE EDUCATION/TRAINING PROGRAM

## 2021-02-15 PROCEDURE — 250N000011 HC RX IP 250 OP 636: Performed by: STUDENT IN AN ORGANIZED HEALTH CARE EDUCATION/TRAINING PROGRAM

## 2021-02-15 PROCEDURE — 86900 BLOOD TYPING SEROLOGIC ABO: CPT | Performed by: INTERNAL MEDICINE

## 2021-02-15 PROCEDURE — 99233 SBSQ HOSP IP/OBS HIGH 50: CPT | Mod: GC | Performed by: INTERNAL MEDICINE

## 2021-02-15 PROCEDURE — 634N000001 HC RX 634: Performed by: INTERNAL MEDICINE

## 2021-02-15 PROCEDURE — P9016 RBC LEUKOCYTES REDUCED: HCPCS | Performed by: INTERNAL MEDICINE

## 2021-02-15 PROCEDURE — 86850 RBC ANTIBODY SCREEN: CPT | Performed by: INTERNAL MEDICINE

## 2021-02-15 PROCEDURE — 86901 BLOOD TYPING SEROLOGIC RH(D): CPT | Performed by: INTERNAL MEDICINE

## 2021-02-15 PROCEDURE — 200N000002 HC R&B ICU UMMC

## 2021-02-15 PROCEDURE — 258N000003 HC RX IP 258 OP 636: Performed by: INTERNAL MEDICINE

## 2021-02-15 PROCEDURE — 272N000078 HC NUTRITION PRODUCT INTERMEDIATE LITER

## 2021-02-15 PROCEDURE — 999N001017 HC STATISTIC GLUCOSE BY METER IP

## 2021-02-15 PROCEDURE — 80197 ASSAY OF TACROLIMUS: CPT | Performed by: INTERNAL MEDICINE

## 2021-02-15 PROCEDURE — 85018 HEMOGLOBIN: CPT | Performed by: STUDENT IN AN ORGANIZED HEALTH CARE EDUCATION/TRAINING PROGRAM

## 2021-02-15 RX ORDER — SULFAMETHOXAZOLE AND TRIMETHOPRIM 200; 40 MG/5ML; MG/5ML
10 SUSPENSION ORAL
Status: DISCONTINUED | OUTPATIENT
Start: 2021-02-15 | End: 2021-02-24

## 2021-02-15 RX ORDER — HEPARIN SODIUM 1000 [USP'U]/ML
500 INJECTION, SOLUTION INTRAVENOUS; SUBCUTANEOUS CONTINUOUS
Status: DISCONTINUED | OUTPATIENT
Start: 2021-02-15 | End: 2021-02-15

## 2021-02-15 RX ORDER — HEPARIN SODIUM 1000 [USP'U]/ML
500 INJECTION, SOLUTION INTRAVENOUS; SUBCUTANEOUS
Status: COMPLETED | OUTPATIENT
Start: 2021-02-15 | End: 2021-02-15

## 2021-02-15 RX ORDER — METOPROLOL TARTRATE 50 MG
50 TABLET ORAL 2 TIMES DAILY
Status: DISCONTINUED | OUTPATIENT
Start: 2021-02-15 | End: 2021-02-25 | Stop reason: HOSPADM

## 2021-02-15 RX ORDER — SULFAMETHOXAZOLE AND TRIMETHOPRIM 400; 80 MG/1; MG/1
1 TABLET ORAL
Status: DISCONTINUED | OUTPATIENT
Start: 2021-02-15 | End: 2021-02-15

## 2021-02-15 RX ADMIN — Medication 1 PACKET: at 13:05

## 2021-02-15 RX ADMIN — INSULIN GLARGINE 10 UNITS: 100 INJECTION, SOLUTION SUBCUTANEOUS at 07:38

## 2021-02-15 RX ADMIN — INSULIN ASPART 1 UNITS: 100 INJECTION, SOLUTION INTRAVENOUS; SUBCUTANEOUS at 08:00

## 2021-02-15 RX ADMIN — SODIUM CHLORIDE 300 MG: 9 INJECTION, SOLUTION INTRAVENOUS at 07:37

## 2021-02-15 RX ADMIN — OLANZAPINE 10 MG: 2.5 TABLET ORAL at 19:11

## 2021-02-15 RX ADMIN — LIDOCAINE HYDROCHLORIDE 0.5 ML: 10 INJECTION, SOLUTION EPIDURAL; INFILTRATION; INTRACAUDAL; PERINEURAL at 10:57

## 2021-02-15 RX ADMIN — TACROLIMUS 2 MG: 5 CAPSULE ORAL at 07:37

## 2021-02-15 RX ADMIN — BUDESONIDE 0.5 MG: 0.5 INHALANT ORAL at 08:37

## 2021-02-15 RX ADMIN — BUDESONIDE 0.5 MG: 0.5 INHALANT ORAL at 19:59

## 2021-02-15 RX ADMIN — NYSTATIN 1000000 UNITS: 500000 SUSPENSION ORAL at 19:11

## 2021-02-15 RX ADMIN — ONDANSETRON 4 MG: 2 INJECTION INTRAMUSCULAR; INTRAVENOUS at 02:30

## 2021-02-15 RX ADMIN — Medication 1 PACKET: at 07:38

## 2021-02-15 RX ADMIN — LOPERAMIDE HCL 2 MG: 1 SOLUTION ORAL at 18:35

## 2021-02-15 RX ADMIN — ISODIUM CHLORIDE 3 ML: 0.03 SOLUTION RESPIRATORY (INHALATION) at 19:59

## 2021-02-15 RX ADMIN — EPOETIN ALFA-EPBX 4000 UNITS: 10000 INJECTION, SOLUTION INTRAVENOUS; SUBCUTANEOUS at 10:56

## 2021-02-15 RX ADMIN — Medication 1 PACKET: at 19:12

## 2021-02-15 RX ADMIN — PREDNISONE 20 MG: 20 TABLET ORAL at 07:37

## 2021-02-15 RX ADMIN — ALBUTEROL SULFATE 2.5 MG: 2.5 SOLUTION RESPIRATORY (INHALATION) at 00:52

## 2021-02-15 RX ADMIN — INSULIN ASPART 2 UNITS: 100 INJECTION, SOLUTION INTRAVENOUS; SUBCUTANEOUS at 15:27

## 2021-02-15 RX ADMIN — SULFAMETHOXAZOLE AND TRIMETHOPRIM 80 MG: 200; 40 SUSPENSION ORAL at 13:09

## 2021-02-15 RX ADMIN — METOPROLOL TARTRATE 25 MG: 25 TABLET, FILM COATED ORAL at 07:37

## 2021-02-15 RX ADMIN — HEPARIN SODIUM 5000 UNITS: 5000 INJECTION, SOLUTION INTRAVENOUS; SUBCUTANEOUS at 15:18

## 2021-02-15 RX ADMIN — TACROLIMUS 2.5 MG: 5 CAPSULE ORAL at 18:50

## 2021-02-15 RX ADMIN — ALBUTEROL SULFATE 2.5 MG: 2.5 SOLUTION RESPIRATORY (INHALATION) at 19:59

## 2021-02-15 RX ADMIN — VALGANCICLOVIR HYDROCHLORIDE 450 MG: 50 POWDER, FOR SOLUTION ORAL at 19:12

## 2021-02-15 RX ADMIN — METOPROLOL TARTRATE 50 MG: 25 TABLET, FILM COATED ORAL at 19:11

## 2021-02-15 RX ADMIN — HEPARIN SODIUM 500 UNITS: 1000 INJECTION INTRAVENOUS; SUBCUTANEOUS at 10:50

## 2021-02-15 RX ADMIN — SODIUM CHLORIDE 300 ML: 9 INJECTION, SOLUTION INTRAVENOUS at 10:55

## 2021-02-15 RX ADMIN — ALBUTEROL SULFATE 2.5 MG: 2.5 SOLUTION RESPIRATORY (INHALATION) at 13:44

## 2021-02-15 RX ADMIN — ACETYLCYSTEINE 2 ML: 200 SOLUTION ORAL; RESPIRATORY (INHALATION) at 19:59

## 2021-02-15 RX ADMIN — Medication 40 MG: at 07:37

## 2021-02-15 RX ADMIN — NYSTATIN 1000000 UNITS: 500000 SUSPENSION ORAL at 15:18

## 2021-02-15 RX ADMIN — ACETYLCYSTEINE 2 ML: 200 SOLUTION ORAL; RESPIRATORY (INHALATION) at 08:37

## 2021-02-15 RX ADMIN — HEPARIN SODIUM 5000 UNITS: 5000 INJECTION, SOLUTION INTRAVENOUS; SUBCUTANEOUS at 06:09

## 2021-02-15 RX ADMIN — INSULIN ASPART 1 UNITS: 100 INJECTION, SOLUTION INTRAVENOUS; SUBCUTANEOUS at 13:07

## 2021-02-15 RX ADMIN — HEPARIN SODIUM 500 UNITS/HR: 1000 INJECTION INTRAVENOUS; SUBCUTANEOUS at 10:58

## 2021-02-15 RX ADMIN — Medication: at 10:58

## 2021-02-15 RX ADMIN — SODIUM CHLORIDE 250 ML: 9 INJECTION, SOLUTION INTRAVENOUS at 10:55

## 2021-02-15 RX ADMIN — NYSTATIN 1000000 UNITS: 500000 SUSPENSION ORAL at 13:05

## 2021-02-15 RX ADMIN — ALBUTEROL SULFATE 2.5 MG: 2.5 SOLUTION RESPIRATORY (INHALATION) at 08:37

## 2021-02-15 RX ADMIN — NYSTATIN 1000000 UNITS: 500000 SUSPENSION ORAL at 07:38

## 2021-02-15 RX ADMIN — Medication 5 ML: at 07:37

## 2021-02-15 ASSESSMENT — ACTIVITIES OF DAILY LIVING (ADL)
ADLS_ACUITY_SCORE: 17
ADLS_ACUITY_SCORE: 16
ADLS_ACUITY_SCORE: 17
ADLS_ACUITY_SCORE: 16
ADLS_ACUITY_SCORE: 16
ADLS_ACUITY_SCORE: 17

## 2021-02-15 ASSESSMENT — MIFFLIN-ST. JEOR: SCORE: 1142.76

## 2021-02-15 NOTE — PROGRESS NOTES
Care Management Follow Up    Length of Stay (days): 22    Expected Discharge Date: 2/18-2/19     Concerns to be Addressed:  Airway stenosis     Patient plan of care discussed at interdisciplinary rounds: Yes    Anticipated Discharge Disposition:  LTACH vs TCU     Anticipated Discharge Services:  Vent weaning and intensive rehab    Education Provided on the Discharge Plan: Spoke with family about LTACH vs TCU during care conference on 2/10. Family spoke with pt after conference and she is in agreement, wants to continue to fight.     Patient/Family in Agreement with the Plan:  yes    Referrals Placed by RNCC:  I have completed referrals to [facility] and [facility] today.     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 Found to have PCP pneumonia. She is awake and interactive, wants to continue to fight. She has agreed to trach placement and this was completed 2/12. Interventional Pulm team following for possible dilation and/ or stent prior to rehab transfer. Pt is tentatively scheduled for G-J tube placement 2/16 per chart review.   Pt also has ESRD and was previously on outpt iHD twice weekly. Our Nephrology team has updated that pt will most likely need Three x weekly iHD moving forward.   Vent weaning with trach in conjunction to iHD may be a difficult community placement. LTACH referrals completed and liaisons will review chart and let me know today if she is accepted. If not, pt will need to complete vent weaning in our ICU and [facility] MICU and Pulm transplant are aware of this plan. RNCC/Transplant SW following for discharge planning.     Wilian Osorio RN,  [facility]  ICU Care Coordinator  Pager: [phone]  Phone:  [phone]

## 2021-02-15 NOTE — PROGRESS NOTES
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     Overnight/Interim History:     No major events overnight.  Hgb 6.7 this a.m. and received 1 unit PRBCs.    Acute Critical Care Issues/Key Findings:     Kecia Blue is critically ill due to acute on chronic hypoxemic respiratory failure.    Acute on chronic hypoxemic respiratory failure: Thought to be secondary to PJP PNA in setting BLST and right mainstem stenosis.  Has now completed 21 days of Bactrim.  Status post trach 2/12.  On SBT this morning and doing quite well.    Switch from treatment to prophylaxis dose of Bactrim (completed 21 days of treatment)    Spoke with IP, will add her onto the schedule this week for balloon dilatation of right mainstem    Continue SBT twice daily (this a.m. doing well on PS 5/5)    Continue saline and Mucomyst nebs for secretion clearance    ESRD: Tolerated well.    HD today    Schedule per renal    ICU sedation, history of anxiety: Much calmer this a.m. per reports.  Says she is feeling quite well.  Has been off of Precedex drip since tracheostomy placement.    Continue bedtime and as needed Zyprexa    History of bilateral sequential lung transplant (2018): complicated by right mainstem bronchial stenosis requiring balloon dilatation and stent placement in the past.  Appreciate pulmonary transplant team assistance.     Plan for right mainstem balloon dilatation with IP at some point this week    SBT trials as above    Continue valganciclovir and posaconazole    Check posaconazole level on Tuesday    Increase tacrolimus dose to  2/2.5 mg    Continue current prednisone dose for 2 more days and transition back to home dose    Disposition: ICU, working on LTACH placement     Rest per resident note from today.     The patient was seen and examined with the resident/fellow physician.  We have discussed the patient in detail and I agree with the findings, assessment, and plan as documented in their note from today. The plan was formulated in conjunction with pharmacy, ICU nurses, and respiratory therapist. I have personally reviewed today's vital signs, medications, laboratory and imaging results. I have reviewed all consults that have been ordered and are active for this patient.      Critical Care Time: 35 min. I spent this time (excluding procedures) personally providing and directing critical care services at the bedside and on the critical care unit.      Date of Service (when I saw the patient): 02/15/21    Jacinto Jones MD   of Medicine  Division of Pulmonary, Allergy, Critical Care and Sleep Medicine   [facility]  Pager: [phone]

## 2021-02-15 NOTE — PROGRESS NOTES
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 Found to have PCP pneumonia.    CHANGES and MAJOR THINGS TODAY:   - Trach at bedside 2/12, plan for IR PEG-J tube placement tomorrow,  NPO after midnight and HOLD heparin this evening   - Continue weaning trail BID, PS goal 5/5   - S/p 1PRBC transfusion for hgb 6.7, recheck 8.4, no sign of bleeding. Goal to minimize ICU lab draws as much as possible   - Increase activity, up to chair, PT following  - iHD today  - Last dose of Bactrim 21 days today,  switch to ppx dose  2/15    PLAN:  Neuro:  # Pain and sedation  - Off Precedex post tracheostomy   - prn hydroxyzine for anxiety    # Delirium  - Scheduled 10 mg at bedtime and PRN Zyprexa     # Insomnia  - Melatonin available prn    # Anisocoria (L>R)  Intermittently fixed and dilated pupils. CTH unremarkable. Neuro examination unremarkable.  - Opthomalogy evaluated on 2/1    Pulmonary:  # Acute Hypoxic Respiratory Failure  # Tracheostomy (2/12/21)  Suspected 2/2 PJP initially. OSH CT 1/23 showed bilateral ground glass opacities and consolidative lung infiltrates centrally distributed to the upper lobes and RLL. COVID negative x3 at outside hospital. Concern for secondary pneumonia given worsening clinical picture. 2/4 CT chest indicated worsening consolidations consistent with progression of pneumocystis with possibilty of superimposed secondary pneumonia. Previously on inhaled epoprostenol. S/p Perc tracheostomy 2/12 with  interventional pulm. Mechanical ventilation AC 18/320/+8/50% - dropped RR to 18 from 24; wean FiO2 to 40% and then PEEP to 5 after tracheostomy 2/12. Last dose of Zosyn 1/12.   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  - Positive PJP PCR on 1/28 with positive fungitel   - Continue PTA tacrolimus  - Prednisone taper, holding home dose for now  - Pulmonary lung transplant team consulted and following, appreciate recs - may need dilation and/or stent after acute illness has resolved    Cardiovascular:  # Shock, resolved  # Transient hypotension related to dialysis, sleep- Resolved  Last ECHO 10/21/20 showed EF 60-65%, normal LV and RV function.   - OFF norepinephrine  - D/c midodrine to 10 mg q8 hours    # Afib with RVR, resolved  # ST  Intermittently going in and out of A-fib, currently ST.  - Continue to monitor  - Metoprolol 12.5mg BID, increase dose to 25mg BID    # Hx HTN  - Hold PTA carvedilol and amlodipine    GI/Nutrition:  # Portal HTN  Liver biopsy positive for congestive hepatopathy. Previous had ascites thought to be r/t elevated right sided heart pressures. Last saw GI on 12/21/20 and patient endorsed that her ascites is no longer present. Etiology unclear.   - NTD    #Nutrition  - NJT placed by radiology, continue TF  - PEG-J next week, tentatively scheduled for 2/16  - NPO after midnight  and HOLD heparin evening of 2/15  - Consider adding fiber on TF      # Constipation-resolved and now with loose stools  - Hx of constipation with bactrim, reports frequent BMs  - Change schedule bowel regimen to PRN, due to  multiple loose stools     Renal/Fluids/Electrolytes:  # ESRD on iHD twice weekly(M/Th)  # Anion gap metabolic acidosis r/t ESRD, uremia, resolved  Outpatient dry body weight 56.5 kg. CRRT 2/4 to 2/8. Last HD 2/11 for 2L off   - Nephrology following   - iHD today  - I&O  - Daily weights   - Trend BMP     # Hypomagnesemia- resolved  - Replace Mag x1    Endocrine:  # Hyperglycemia, steroid induced  - High intensity sliding scale insulin  - 10 units glargine daily    # Seronegative RA with Raynaud's   - NTD, monitor     ID:  # Sepsis in setting of PJP pneumonia, possible secondary pneumonia  Suspected related to PJP pneumonia. OSH CT 1/23 + bilateral ground glass opacities and consolidative lung infiltrates centrally distributed to the upper lobes and RLL. Covid-19 negative X3 at OSH. Procalcitonin negative. Urine strep, CMV, and RPP negative. Given acute and severe decompensation, concern for secondary pneumonia. 2/4 CT chest which displayed worsening consolidations consistent with progression of pneumocystis with the possibility of superimposed secondary pneumonia.  - PJP PCR positive, fungitell positive as well  - Last dose of Bactrim 21 days today,  switch to ppx dose  2/15; prednisone taper  - Zosyn 10-day course  completed on 2/12  - s/p thora 1/25, fluid exudative, negative w/u  - Weekly COVID test per hospital policy, next 2/19    # Chronic/Stable right aspergillus empyema   - Continue posaconazole, treatment dosing, will monitor QTc intermittently (431 on 2/11)  - Posaconazole level ordered for 2/16    Hematology:    # Anemia r/t renal disease, chronic stable   # Thrombocytopenia r/t critical illness  # Leukocytosis, infection/steroids  Takes Aranesp 25 mcg every four weeks, last dose 1/14, next scheduled was 2/11.   Currently receiving epoetin alisha-epox with dialysis   - Discussed with nephrology, will add replacement with HD run on 2/13   -iHD today  - S/p 1PRBC transfusion for hgb 6.7, recheck 8.4, no sign  of bleeding. Goal to minimize ICU lab draws as much as possible     Musculoskeletal:  # Generalized weakness  - PT/OT  - Increase activity, up to chair, PT following    Skin:  # Dermatomyositis antitryptase syndrome   - Noted, NTD    General Cares/Prophylaxis:    DVT Prophylaxis: Heparin subcutaneous (will hold for procedure)   GI Prophylaxis: PPI  Restraints: none  Family Communication:  updated at the bedside  Code Status: full    Lines/tubes/drains:  - trach  - HD fistula left forearm  - CVC Left IJ  - Art line - to be removed 2/14  - NJT    Disposition:  - Medical ICU     Patient seen and findings/plan discussed with medical ICU staff, Dr. Robert Quinones MD  Internal Medicine, PGY-1  [facility]   Pager: [phone]    ====================================  INTERVAL HISTORY:   No acute events overnight. Improved loose stools, denies  pain or any other concerns.     OBJECTIVE:   1. VITAL SIGNS:   Temp:  [98  F (36.7  C)-99  F (37.2  C)] 98.5  F (36.9  C)  Pulse:  [105-135] 115  Resp:  [23-32] 24  BP: ()/(56-86) 129/79  MAP:  [81 mmHg-91 mmHg] 91 mmHg  Arterial Line BP: (112-125)/(57-60) 125/60  FiO2 (%):  [40 %] 40 %  SpO2:  [92 %-100 %] 93 %  Ventilation Mode: CMV/AC  (Continuous Mandatory Ventilation/ Assist Control)  FiO2 (%): 40 %  Rate Set (breaths/minute): 18 breaths/min  Tidal Volume Set (mL): 320 mL  PEEP (cm H2O): 5 cmH2O  Pressure Support (cm H2O): 5 cmH2O  Oxygen Concentration (%): 40 %  Resp: 24    2. INTAKE/ OUTPUT:   I/O last 3 completed shifts:  In: 1750 [I.V.:290; NG/GT:380]  Out: -  Urine Mixed with stool, unable to monitor accurately per nursing staff    3. PHYSICAL EXAMINATION:  General: Laying in bed, intubated via trachostomy and alert  Neuro: eyes open, following commands, alert and oriented x4  Pulm/Resp: Coarse breath sounds bilaterally, breathing non-labored  CV: ST. Pulses intact. No edema.   Abdomen: Soft, non-distended, non-tender, bowel sounds  active.   Incisions/Skin: Left AV fistula with thrill and bruit    4. LABS:   Arterial Blood Gases   Recent Labs   Lab 02/10/21  2000 02/10/21  1555 02/09/21  1225 02/09/21  0403   PH 7.41 7.36 7.38 7.42   PCO2 42 46* 49* 44   PO2 90 105 74* 116*   HCO3 26 26 29* 28     Complete Blood Count   Recent Labs   Lab 02/15/21  0418 02/14/21  0336 02/13/21  0323 02/12/21  1545 02/12/21  0315   WBC 3.7* 5.3 7.2  --  4.6   HGB 6.7* 7.5* 7.8*  --  7.6*   * 109* 114* 114* 86*     Basic Metabolic Panel  Recent Labs   Lab 02/15/21  0418 02/14/21  0336 02/13/21  0323 02/12/21  0315    136 134 135   POTASSIUM 3.9 3.8 4.2 4.1   CHLORIDE 100 100 100 100   CO2 27 29 26 28   BUN 52* 32* 52* 33*   CR 2.75* 1.84* 2.59* 1.71*   * 149* 142* 175*     Liver Function Tests  Recent Labs   Lab 02/11/21 0645 02/09/21  1615 02/09/21  0403   AST  --  6 <3   ALT  --  32 28   ALKPHOS  --  218* 182*   BILITOTAL  --  0.4 0.3   ALBUMIN  --  2.0* 1.8*   INR 1.07  --   --      Coagulation Profile  Recent Labs   Lab 02/12/21 0315 02/11/21  0645   INR  --  1.07   PTT 28  --        5. RADIOLOGY:   No results found for this or any previous visit (from the past 24 hour(s)).

## 2021-02-15 NOTE — PLAN OF CARE
Major Shift Events:      Neuro: Intact    Card: Sinus tachycardia/sinus dysrhythmia with PACs 100-120s, receiving 25mg PO metoprolol. Normotensive. Hgb 6.7, no signs of bleeding, 1PRBCs currently transfusing.     Resp: CMV 40%/24/320/5    GI/: Loose BM x1, PRN immodium given after several more BM during day reported. PRN zofran x1 for episode of nausea, resolved.     Plan: Continue PS on vent, increasing ambulation. Hemodialysis 2/15. Plan for G-tube on 2/16 with CVC removal after that time.     For vital signs and complete assessments, please see documentation flowsheets.

## 2021-02-15 NOTE — PROGRESS NOTES
Major Shift Events:  HD run today, pulled 3.5L vitally stable throughout run. CPAP weaned for 5hrs. Up to chair with assist x2 and walker. NPO at midnight for GJ tube tomorrow.   Plan: GJ tube placement tomorrow. Bronch with balloon early this week.   For vital signs and complete assessments, please see documentation flowsheets.

## 2021-02-15 NOTE — PLAN OF CARE
Pt had a strong day today. PS trial x 2 today, over 2 hours each time, tolerated well. Pt also worked with PT/OT today. Stood and pivoted to chair, then did additional exercises. Up to chair for over 2 hours, slight episode of nausea, resolved without medication. PRN zofran available if needed.   Pt had 5 loose stools today since 07:00. Daily fiber and PRN imodium ordered. Meduim amt of unmeasurable urine today.  VS stable, HR SA/Afib in the 120's-130's. Scheduled metoprolol increased to 25 mg twice daily. Afternoon HR down to low 100's. Trach site had small amt of oozing blood today, but now resolved.  Arterial line removed. Right arm cuff pressures, WNL.   Plan is for PS trial again in AM, work with PT/OT, and for PEG tube placement on Tuesday.  Pt's , Ranjan, at bedside all day. He is very attentive and active in his wife's care. He has been updated with plan and questions answered.

## 2021-02-15 NOTE — PROGRESS NOTES
"Interventional Pulmonary Progress Note    Subjective  Patient is feeling well today. No complaints about trach. No bleeding or pain and is more comfortable than ETT through mouth. Is pressure supporting today and doing well on exam today.     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 Underwent tracheostomy placement 2/12/21 for multiple failed extubations. Has #8 Shiley in place.     --Sutures from trach can be removed on POD7 by primary team  --Discussed with primary team today and note difficulty in LTACH placement given concurrent vent weaning and HD needs. Placement would be easier if she was able to trach dome. Will attempt to add patient onto OR schedule this week for right mainstem dilation to see if it helps with vent weaning.     Patient seen and discussed with Dr. Lin.     Juana Baugh  Interventional Pulmonary Fellow  [phone]  "

## 2021-02-15 NOTE — PROGRESS NOTES
HEMODIALYSIS TREATMENT NOTE    Date: 2/15/2021  Time: 2:32 PM    Data:  Pre Wt: 59.4 kg (130 lb 15.3 oz)   Desired Wt: 57.4 kg   Post Wt: 55.9 kg (123 lb 3.8 oz)  Weight change: 3.5 kg  Ultrafiltration - Post Run Net Total Removed (mL): 3500 mL  Vascular Access Status: Graft  patent  Dialyzer Rinse: Light  Total Blood Volume Processed: 75.9 L   Total Dialysis (Treatment) Time: 3 hrs   Dialysate Bath: K 3, Ca 2.25  Heparin 500 units loading + 500 units/hr    Lab:   No    Assessment:  Resting in bed. Vent/trach. Alert and oriented. Denies pain. Graft L UE. 15 ga needles with lidocaine. Easy cannulation; excellent flow. Needles placed side by side. Per akbar Christine to pull as tolerated - 3.5 L net. Stasis in AVG in 6 min using quick clot; dsg applied. Tolerated run well. Final crit line -14.1; Kt/V per machine 1.23.      Plan:    Remain in room. Next run per nephrology.     Marcio Choi, LOYDAN, RN

## 2021-02-15 NOTE — PROGRESS NOTES
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 Nephrology consutled for dialysis needs.     Interval History :   Mrs Blue was seen on HD today, wt is up a bit and has increased abdominal edema so trying for ~3L if able to tolerate, can lengthen run if needed to achieve.  Has trach, planning PEG this week then can likely go to TCU.  Needed 1u PRBC this am, on epo with runs.       Assessment & Recommendations:   ESRD-Runs at [place] through [facility] group.  Has atypical HD schedule of Monday and Thursday, has AVF with partial graft which has been challenging to access per RN's.  Needed CRRT 2/5-2/8 due to hemodynamic instability but otherwise has run iHD.  Working on getting to stable 3x/week schedule, may be progressing to trach for resp failure.                   -HD today, have run every other day.                -Has L arm AVF/graft, somewhat challenging access.                -Removed temp HD line on 2/9.        Volume status-Has abdominal edema and wt is up so pulling more UF today, on track for ~3L midway through run, would lengthen run if needed to get 3-3.5L.       Electrolytes/pH-K 3.9, bicarb 27.      Ca/phos/pth-Ca 8.6, Mg and Phos mildly up last check.      Anemia-Hgb 8.4 after 1u PRBC's this am.  Checked iron stores and sats 36% on 2/3, starting EPO 4k with runs.       Nutrition-Nutren TF.       Seen and discussed with Dr Tan     Recommendations were communicated to primary team via verbal communication.        ADRIÁN Lo CNS  Clinical Nurse Specialist  [phone]    Review of Systems:   I reviewed the  "following systems:  Gen: No fevers or chills  CV: No CP at rest  Resp: No SOB at rest  GI: No N/V    Physical Exam:   I/O last 3 completed shifts:  In: 1750 [I.V.:290; NG/GT:380]  Out: -    /78   Pulse 129   Temp 98.4  F (36.9  C) (Oral)   Resp 26   Ht 1.6 m (5' 2.99\")   Wt 59.4 kg (130 lb 14.4 oz)   LMP 06/07/2014 (Exact Date)   SpO2 97%   BMI 23.19 kg/m       GENERAL APPEARANCE: Vent via trached.   EYES: no scleral icterus  Endo: no moon facies  Pulmonary: Intubated, proned, equal expansion.    CV: regular rhythm, normal rate - Edema present  GI: soft, non distended  MS: no evidence of inflammation in joints, no muscle tenderness  :  Basilio present  SKIN: warm, dry, +1 edema.    NEURO: No focal deficits.      Labs:   All labs reviewed by me  Electrolytes/Renal -   Recent Labs   Lab Test 02/15/21  0418 02/14/21  0336 02/13/21  0323    136 134   POTASSIUM 3.9 3.8 4.2   CHLORIDE 100 100 100   CO2 27 29 26   BUN 52* 32* 52*   CR 2.75* 1.84* 2.59*   * 149* 142*   CHELSEY 8.6 8.6 8.7   MAG 1.9 1.8 2.4*   PHOS 4.5 3.6 5.3*       CBC -   Recent Labs   Lab Test 02/15/21  1026 02/15/21  0418 02/14/21  0336 02/13/21  0323   WBC  --  3.7* 5.3 7.2   HGB 8.4* 6.7* 7.5* 7.8*   PLT  --  116* 109* 114*       LFTs -   Recent Labs   Lab Test 02/09/21  1615 02/09/21  0403 02/08/21  0450   ALKPHOS 218* 182* 178*   BILITOTAL 0.4 0.3 0.4   ALT 32 28 29   AST 6 <3 4   PROTTOTAL 6.1* 5.6* 6.1*   ALBUMIN 2.0* 1.8* 2.0*       Iron Panel -   Recent Labs   Lab Test 02/03/21  0415 12/13/18  1033 08/01/18  0921   IRON 51 16* 93   IRONSAT 36 7* 37   YOLA  --  302* 571*           Current Medications:    acetylcysteine  2 mL Nebulization BID     albuterol  2.5 mg Nebulization Q6H     B and C vitamin Complex with folic acid  5 mL Oral or Feeding Tube Daily     budesonide  0.5 mg Nebulization BID     fiber modular (NUTRISOURCE FIBER)  1 packet Per Feeding Tube Daily     gelatin absorbable  1 each Topical During Hemodialysis " (from stock)     heparin ANTICOAGULANT  5,000 Units Subcutaneous Q8H     insulin aspart  1-12 Units Subcutaneous Q4H     insulin glargine  10 Units Subcutaneous QAM AC     iohexol  50 mL ORAL OR NJ TUBE Once     metoprolol tartrate  25 mg Oral or Feeding Tube BID     nystatin  1,000,000 Units Mouth/Throat 4x Daily     OLANZapine  10 mg Oral or Feeding Tube Q24H     pantoprazole  40 mg Oral QAM AC     posaconazole (NOXAFIL) intermittent infusion  300 mg Intravenous Daily     [Held by provider] predniSONE  2.5 mg Oral or Feeding Tube QPM     predniSONE  20 mg Oral Daily     [Held by provider] predniSONE  5 mg Oral or Feeding Tube QAM     protein modular  1 packet Per Feeding Tube BID     sodium chloride (PF)  3 mL Intracatheter Q8H     sodium chloride  3 mL Nebulization BID     sulfamethoxazole-trimethoprim  10 mL Oral or Feeding Tube Once per day on Mon Wed Fri     tacrolimus  2 mg Oral or Feeding Tube Q24H     tacrolimus  2 mg Oral or Feeding Tube Q24H     valGANciclovir  450 mg Oral Once per day on Mon Thu       dextrose       heparin (porcine) 500 Units/hr (02/15/21 3246)     - MEDICATION INSTRUCTIONS -       sodium chloride 0.9%       sodium chloride 10 mL/hr at 02/08/21 0400     - MEDICATION INSTRUCTIONS -

## 2021-02-15 NOTE — PROGRESS NOTES
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  Patient was admitted to OSH on  for acute hypoxemic respiratory failure and new lung opacities. Intubated  and transferred to [facility] for ongoing management.  Extubated  but reintubated - for progressive hypoxemia.  Rapid decompensation 2/3 with ARDS presentation requiring reintubation, prone positioning, and inhaled epoprostenol.  Significant rise in leukocytosis (2/3) concerning for worsening PJP versus secondary infection. Now with improved oxygenation and doing PST.       Today's recommendations:  - Ok to transition from therapeutic bactrim to PPx dose today   - Continue the current dose of prednisone for 2 more days and then can transition back to her home dose   - Will increased the tacro dose to 2/2.5 mg  - Continue albuterol and Mucomyst  given distal mucus plugging on bronch this past week  - Continue IV Posaconazole and check a level on Tuesday    --consider transitioning to oral once gj tube placed      Acute hypoxemic respiratory failure:   Right post-obstructive Stenotrophomonas and Eikenella pneumonia:  PJP pneumonia:  Septic shock:   Airway stenosis with distal plugging:  ARDS: Presented to OSH ED with increased SOB and hypoxia, initially requiring 4L NC but continued to decline and subsequently intubated .  Hemodynamic instability after intubation requiring pressors, transferred to [facility].  Afebrile but with leukocytosis.   COVID and respiratory panel negative.  PTA bronch (12/23/20) with moderate airway obstruction and RML/RLL mucous plugging, cultures with Stenotrophomonas and Eikenella (IV ceftazidime 1/13-1/19).  OSH CT chest with new multifocal GGO bilaterally and increased left loculated pleural effusion.  Repeat bronch (1/24) with RUL BAL with thick copious secretions to RML/RLL, PJP PCR positive.  Extubated on 1/26, but reintubated from 1/27-1/29 for progressive hypoxemia.  Remained on either HFNC % FiO2 or BiPAP % FiO2, then eventually decompensated on 2/3 and again reintubated.  Working revealing for ARDS, proning protocol initiated (2/3) with inhaled epoprostenol, pressors initiated.  Significant rise in leukocytosis (2/3) concerning for worsening PJP vs secondary infection.  CT chest (2/3) with progressive consolidation (consistent with ARDS) and possible superimposed infection findings, stable LLL chronic empyema.  CRRT initiated 2/4 as below.  CT chest 2/10 with improvement improvement in aeration bilaterally.  Bronch 2/11 with airway stenosis at right anastomosis  - ABX per MICU: Bactrim (1/24-2/15) for PJP and Steno (noted 12/23) coverage, empiric Zosyn (2/3-2/12)  -bactrim for the PJP pneumonia, completed 21 day course, this will cover the Eikenella and Stenothrophomonas he had on BAL from 12/23/2021  - Prednisone 20 mg once daily for until 2/18  - Chest physiotherapy QID and nebs: Albuterol QID and Mucomyst BID, and Pulmicort BID  - Ventilator management per MICU     Aspergillus empyema (left-sided): First noted 10/8/19, negative in November and again on admission.  CT scan on 7/17/20 with increased mass-like density, likely pleural-based, in LLL area s/p needle aspiration (8/13/20) with Aspergillus fumigatus on cultures s/p intrapleural amphotericin bead placement (11/20).  Following with ID as OP.  LFTs stable on admission (ALP chronically mildly elevated).  CT chest with increased loculated left  pleural effusion s/p thoracentesis (1/25), exudative with 87% neutrophils, very high LDL (6308), cytology normal.   - AFB pleural cultures (1/25) NGTD--stain negative  - PTA posaconazole, PTA plan for indefinite course.  Changed to IV Posaconazole 2/9/2021 due to subtherapeutic level despite increasing the enteral Posaconazole  --check Posaconazole level 2/16--ordered     S/p bilateral lung transplant for ILD 2/2 CTD:   Right mainstem bronchial stenosis (s/p dilation 3/2019): Last seen in pulmonary clinic 12/2. DSA (1/25) not detected.   -  IP to manage right bronchial stenosis     Immunosuppression: On 2 drug IST d/t recurrent infections  - Tacrolimus  Goal level 8-10, level 7.3 on 2/15, will increase the dose to 2/2.5 mg and check a steady state level after 5 doses on 2/18  - Chronic prednisone dose on hold with above dose steroids (5 mg qAM / 2.5 mg qPM)  - anticipate transition on 2/18--will need to be taken off hold     Prophylaxis:   - Bactrim treatment dose as above, PTA had not been on PJP ppx since 7/28/20 as CD4 > 200     H/o CMV viremia: CMV D+/R+.  CMV <137 (1/25).  - VGCV for CMV ppx with stress dose steroids (1/26)      EBV viremia: Level 12/9/20 mildly elevated to 10K (log 4) from prior 3K (3.5 log) on 9/9/20, repeat (1/25/21) decreased to 5619 copies (log of 3.8).  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 Chronic iHD M/Th via AVF with partial graft.  Not able to tolerate iHD 2/3 d/t hypotension, line placed and transitioned to CRRT 2/4. Now back to iHD   - Monitoring of tacrolimus as above  - Dialysis management per nephrology     Atrial fibrillation w/ RVR: H/o paroxysmal AF, last 10/2019.  Recurrence 1/30 with RVR (-140s), likely in the setting of acute pulmonary illness.  - Management per MICU     Oropharyngeal candidiasis: White tongue plaque noted 2/1.  - Nystatin QID (2/1)      We appreciate the excellent care provided  "by the MICU team. Recommendations communicated via in person rounding and this note. Will continue to follow along closely, please do not hesitate to call with any questions or concerns.       Seen and discussed with Dr. Fermin Braun MD   Pulmonary and Critical Care Fellow   Pager [phone]      Subjective & Interval History:     Patient feels very comfortable today, she is on pressure support, she has no pain, she feels comfortable with her breathing, no pain.  No nausea or vomiting.    Review of Systems:     C: No fever, no chills  ENT/MOUTH: trach in place, no nasal congestion or drainage  RESP: See interval history  CV: No chest pain, no peripheral edema  GI: No nausea, no vomiting, no change in stools, no reflux symptoms  : No urine  MUSCULOSKELETAL: No myalgias, no arthralgias  NEURO: No headache  PSYCHIATRIC: Mood stable    Physical Exam:     Vital signs:  Temp: 98.3  F (36.8  C) Temp src: Oral BP: 131/85 Pulse: 124   Resp: 26 SpO2: 97 % O2 Device: Mechanical Ventilator Oxygen Delivery: 60 LPM Height: 160 cm (5' 2.99\") Weight: 59.4 kg (130 lb 14.4 oz)  I/O:       Intake/Output Summary (Last 24 hours) at 2/15/2021 1406  Last data filed at 2/15/2021 1300  Gross per 24 hour   Intake 2130 ml   Output --   Net 2130 ml         Constitutional: lying in bed on vent, in no apparent distress.   HEENT: Eyes with pink conjunctivae, anicteric. Oral mucosa moist without lesions. Trach in place  PULM: Good air flow bilaterally. Rhonchi, no crackles or wheezing  CV: Normal S1 and S2. Tachycardic RR. No murmur, gallop, or rub.    ABD: NABS, soft, nontender, nondistended.    MSK: Moves all extremities.   NEURO: Alert and oriented, able to interact while on vent  SKIN: Warm, dry. No rash on limited exam.   PSYCH: Mood stable.     Lines, Drains, and Devices:  Arterial Line 02/04/21 Brachial (Active)   Site Assessment WDL 02/13/21 0800   Line Status Pulsatile blood flow 02/13/21 0800   Arterine Line Cap Change Due " 02/16/21 02/12/21 2000   Art Line Waveform Appropriate 02/13/21 0800   Art Line Interventions Leveled;Connections checked and tightened;Flushed per protocol 02/13/21 0800   Color/Movement/Sensation Capillary refill less than 3 sec 02/13/21 0800   Line Necessity Yes, meets criteria 02/13/21 0800   Dressing Type Transparent 02/13/21 0800   Dressing Status Clean, dry, intact 02/13/21 0800   Dressing Intervention Dressing changed/new dressing 02/07/21 0400   Dressing Change Due 02/14/21 02/13/21 0800   Number of days: 9       CVC Double Lumen 10/25/19 Right Internal jugular (Active)   Number of days: 477       Hemodialysis Vascular Access Arteriovenous fistula Left Arm (Active)   Site Assessment WDL except;Ecchymotic 02/13/21 0800   Cannulation Needle Size 15 02/11/21 1135   Dressing Intervention New dressing 02/11/21 1515   Dressing Status Clean, dry, intact 02/13/21 0800   Verification by x-ray Not applicable 02/09/21 1405   Hand Off Report Yes 02/11/21 1515   Number of days: 19       CVC TRIPLE LUMEN Right Internal jugular (Active)   Site Assessment WDL 02/13/21 0800   Dressing Type Chlorhexidine sponge;Transparent 02/13/21 0800   Dressing Status clean;dry;intact 02/13/21 0800   Dressing Intervention dressing changed 02/07/21 0400   Dressing Change Due 02/14/21 02/13/21 0800   Line Necessity yes, meets criteria 02/13/21 0800   Blue - Status infusing 02/13/21 0800   Blue - Cap Change Due 02/16/21 02/13/21 0800   Brown - Status infusing 02/13/21 0800   Brown - Cap Change Due 02/16/21 02/13/21 0800   White - Status saline locked 02/13/21 0800   White - Cap Change Due 02/08/21 02/08/21 0800   Phlebitis Scale 0-->no symptoms 02/13/21 0800   Infiltration? no 02/13/21 0800   Infiltration Scale 0 02/13/21 0800   Infiltration Site Treatment Method  None 02/11/21 1600   Was a vesicant infusing? no 02/11/21 1600   CVC Comment CDI 02/13/21 0800   Number of days: 20     Data:     LABS    CMP:   Recent Labs   Lab 02/15/21  0418  02/14/21 0336 02/13/21 0323 02/12/21 0315 02/09/21  1615 02/09/21  1615 02/09/21  0403    136 134 135   < > 135 137   POTASSIUM 3.9 3.8 4.2 4.1   < > 4.3 4.4   CHLORIDE 100 100 100 100   < > 102 105   CO2 27 29 26 28   < > 28 27   ANIONGAP 8 7 8 7   < > 5 5   * 149* 142* 175*   < > 153* 139*   BUN 52* 32* 52* 33*   < > 20 28   CR 2.75* 1.84* 2.59* 1.71*   < > 1.08* 1.38*   GFRESTIMATED 18* 30* 20* 32*   < > 56* 42*   GFRESTBLACK 21* 34* 23* 38*   < > 65 49*   CHELSEY 8.6 8.6 8.7 8.6   < > 8.3* 8.8   MAG 1.9 1.8 2.4* 1.7   < >  --  2.3   PHOS 4.5 3.6 5.3* 3.6   < >  --  2.9   PROTTOTAL  --   --   --   --   --  6.1* 5.6*   ALBUMIN  --   --   --   --   --  2.0* 1.8*   BILITOTAL  --   --   --   --   --  0.4 0.3   ALKPHOS  --   --   --   --   --  218* 182*   AST  --   --   --   --   --  6 <3   ALT  --   --   --   --   --  32 28    < > = values in this interval not displayed.     CBC:   Recent Labs   Lab 02/15/21  1026 02/15/21  0418 02/14/21 0336 02/13/21 0323 02/12/21  1545 02/12/21 0315   WBC  --  3.7* 5.3 7.2  --  4.6   RBC  --  2.25* 2.46* 2.52*  --  2.50*   HGB 8.4* 6.7* 7.5* 7.8*  --  7.6*   HCT  --  21.9* 24.0* 23.9*  --  23.3*   MCV  --  97 98 95  --  93   MCH  --  29.8 30.5 31.0  --  30.4   MCHC  --  30.6* 31.3* 32.6  --  32.6   RDW  --  21.8* 21.2* 20.1*  --  19.9*   PLT  --  116* 109* 114* 114* 86*       INR:   Recent Labs   Lab 02/11/21  0645   INR 1.07       Glucose:   Recent Labs   Lab 02/15/21  0752 02/15/21  0418 02/15/21  0029 02/14/21  2034 02/14/21  1438 02/14/21  0947 02/14/21  0336 02/14/21  0335 02/13/21  0323 02/13/21  0323 02/12/21  0315 02/12/21  0315 02/11/21  0400 02/11/21  0400 02/10/21  0420 02/10/21  0420   GLC  --  155*  --   --   --   --  149*  --   --  142*  --  175*  --  181*  --  198*   *  --  116* 165* 100* 164*  --  130*   < >  --    < > 173*   < >  --    < >  --     < > = values in this interval not displayed.       Blood Gas:   Recent Labs   Lab 02/10/21  2000  02/10/21  1555 02/09/21  1225   O2PER 50 50 60       Culture Data   Recent Labs   Lab 02/09/21  1657 02/09/21  1649   CULT No growth No growth       Virology Data:   Lab Results   Component Value Date    FLUAH1 Not Detected 01/24/2021    FLUAH3 Not Detected 01/24/2021    TP1362 Not Detected 01/24/2021    IFLUB Not Detected 01/24/2021    RSVA Not Detected 01/24/2021    RSVB Not Detected 01/24/2021    PIV1 Not Detected 01/24/2021    PIV2 Not Detected 01/24/2021    PIV3 Not Detected 01/24/2021    HMPV Not Detected 01/24/2021    HRVS Negative 01/24/2021    ADVBE Negative 01/24/2021    ADVC Negative 01/24/2021    ADVC Negative 12/23/2020    ADVC Negative 10/07/2019       Historical CMV results (last 3 of prior testing):  Lab Results   Component Value Date    CMVQNT <137 (A) 01/25/2021    CMVQNT 238 (A) 01/24/2021    CMVQNT 675 (A) 12/23/2020     Lab Results   Component Value Date    CMVLOG <2.1 01/25/2021    CMVLOG 2.4 (H) 01/24/2021    CMVLOG 2.8 (H) 12/23/2020       Urine Studies    Recent Labs   Lab Test 01/24/21  1729 10/21/19  2240   URINEPH 5.0 5.0   NITRITE Negative Negative   LEUKEST Moderate* Large*   WBCU 34* 115*       Most Recent [place]ze Pulmonary Function Testing (FVC/FEV1 only)  FVC-Pre   Date Value Ref Range Status   12/09/2020 1.12 L    09/09/2020 1.56 L    03/09/2020 1.57 L    02/19/2020 1.78 L      FVC-%Pred-Pre   Date Value Ref Range Status   12/09/2020 34 %    09/09/2020 47 %    03/09/2020 48 %    02/19/2020 54 %      FEV1-Pre   Date Value Ref Range Status   12/09/2020 0.98 L    09/09/2020 1.10 L    03/09/2020 0.96 L    02/19/2020 0.99 L      FEV1-%Pred-Pre   Date Value Ref Range Status   12/09/2020 37 %    09/09/2020 42 %    03/09/2020 37 %    02/19/2020 38 %        IMAGING    Recent Results (from the past 48 hour(s))   XR Chest Port 1 View    Narrative    EXAM: XR CHEST PORT 1 VW  2/12/2021 11:17 AM     HISTORY:  post trach placement & line placement       COMPARISON:  Chest x-ray and CT  2/9/2021    FINDINGS:   AP portable view of the chest. Tracheostomy tube tip projects in the  high thoracic trachea. Right IJ central venous catheter tip projects  in the superior cavoatrial junction. Enteric tube courses below the  diaphragm with tip outside the field-of-view.. Postoperative changes  with intact median sternotomy wires and mediastinal surgical clips.  Bowel clips projecting over the right axilla. Demonstration of diffuse  patchy pulmonary opacities, most pronounced at the lung bases.      Impression    IMPRESSION:   1. Tracheostomy tube tip projects in the thoracic trachea. Additional  support devices are stable.  2. No definite change in the diffuse mixed interstitial and airspace  opacities with likely bilateral pleural effusions.    I have personally reviewed the examination and initial interpretation  and I agree with the findings.    ADAM GONZALEZ MD

## 2021-02-16 ENCOUNTER — APPOINTMENT (OUTPATIENT)
Dept: INTERVENTIONAL RADIOLOGY/VASCULAR | Facility: CLINIC | Age: 61
End: 2021-02-16
Attending: PHYSICIAN ASSISTANT
Payer: COMMERCIAL

## 2021-02-16 ENCOUNTER — APPOINTMENT (OUTPATIENT)
Dept: PHYSICAL THERAPY | Facility: CLINIC | Age: 61
End: 2021-02-16
Attending: INTERNAL MEDICINE
Payer: COMMERCIAL

## 2021-02-16 LAB
ANION GAP SERPL CALCULATED.3IONS-SCNC: 7 MMOL/L (ref 3–14)
B-HCG SERPL-ACNC: 5 IU/L (ref 0–5)
BUN SERPL-MCNC: 34 MG/DL (ref 7–30)
CALCIUM SERPL-MCNC: 8.9 MG/DL (ref 8.5–10.1)
CHLORIDE SERPL-SCNC: 99 MMOL/L (ref 94–109)
CO2 SERPL-SCNC: 28 MMOL/L (ref 20–32)
CREAT SERPL-MCNC: 2.27 MG/DL (ref 0.52–1.04)
ERYTHROCYTE [DISTWIDTH] IN BLOOD BY AUTOMATED COUNT: 21.1 % (ref 10–15)
GFR SERPL CREATININE-BSD FRML MDRD: 23 ML/MIN/{1.73_M2}
GLUCOSE BLDC GLUCOMTR-MCNC: 127 MG/DL (ref 70–99)
GLUCOSE BLDC GLUCOMTR-MCNC: 137 MG/DL (ref 70–99)
GLUCOSE BLDC GLUCOMTR-MCNC: 143 MG/DL (ref 70–99)
GLUCOSE BLDC GLUCOMTR-MCNC: 147 MG/DL (ref 70–99)
GLUCOSE BLDC GLUCOMTR-MCNC: 208 MG/DL (ref 70–99)
GLUCOSE SERPL-MCNC: 154 MG/DL (ref 70–99)
HCT VFR BLD AUTO: 27.4 % (ref 35–47)
HGB BLD-MCNC: 8.7 G/DL (ref 11.7–15.7)
MCH RBC QN AUTO: 30.3 PG (ref 26.5–33)
MCHC RBC AUTO-ENTMCNC: 31.8 G/DL (ref 31.5–36.5)
MCV RBC AUTO: 96 FL (ref 78–100)
PLATELET # BLD AUTO: 145 10E9/L (ref 150–450)
POTASSIUM SERPL-SCNC: 3.6 MMOL/L (ref 3.4–5.3)
RBC # BLD AUTO: 2.87 10E12/L (ref 3.8–5.2)
SODIUM SERPL-SCNC: 134 MMOL/L (ref 133–144)
WBC # BLD AUTO: 4 10E9/L (ref 4–11)

## 2021-02-16 PROCEDURE — 80187 DRUG ASSAY POSACONAZOLE: CPT | Performed by: NURSE PRACTITIONER

## 2021-02-16 PROCEDURE — 49440 PLACE GASTROSTOMY TUBE PERC: CPT

## 2021-02-16 PROCEDURE — 94667 MNPJ CHEST WALL 1ST: CPT

## 2021-02-16 PROCEDURE — 99233 SBSQ HOSP IP/OBS HIGH 50: CPT | Performed by: INTERNAL MEDICINE

## 2021-02-16 PROCEDURE — 250N000012 HC RX MED GY IP 250 OP 636 PS 637: Performed by: INTERNAL MEDICINE

## 2021-02-16 PROCEDURE — 99153 MOD SED SAME PHYS/QHP EA: CPT

## 2021-02-16 PROCEDURE — 94668 MNPJ CHEST WALL SBSQ: CPT

## 2021-02-16 PROCEDURE — 999N000185 HC STATISTIC TRANSPORT TIME EA 15 MIN

## 2021-02-16 PROCEDURE — 272N000078 HC NUTRITION PRODUCT INTERMEDIATE LITER

## 2021-02-16 PROCEDURE — 250N000009 HC RX 250: Performed by: NURSE PRACTITIONER

## 2021-02-16 PROCEDURE — 255N000002 HC RX 255 OP 636: Performed by: INTERNAL MEDICINE

## 2021-02-16 PROCEDURE — 94640 AIRWAY INHALATION TREATMENT: CPT | Mod: 76

## 2021-02-16 PROCEDURE — 49446 CHANGE G-TUBE TO G-J PERC: CPT

## 2021-02-16 PROCEDURE — 250N000011 HC RX IP 250 OP 636: Performed by: RADIOLOGY

## 2021-02-16 PROCEDURE — 999N001017 HC STATISTIC GLUCOSE BY METER IP

## 2021-02-16 PROCEDURE — 97530 THERAPEUTIC ACTIVITIES: CPT | Mod: GP

## 2021-02-16 PROCEDURE — C1769 GUIDE WIRE: HCPCS

## 2021-02-16 PROCEDURE — 258N000001 HC RX 258: Performed by: NURSE PRACTITIONER

## 2021-02-16 PROCEDURE — 99152 MOD SED SAME PHYS/QHP 5/>YRS: CPT

## 2021-02-16 PROCEDURE — 999N000127 HC STATISTIC PERIPHERAL IV START W US GUIDANCE

## 2021-02-16 PROCEDURE — 84702 CHORIONIC GONADOTROPIN TEST: CPT | Performed by: NURSE PRACTITIONER

## 2021-02-16 PROCEDURE — 49446 CHANGE G-TUBE TO G-J PERC: CPT | Mod: GC | Performed by: RADIOLOGY

## 2021-02-16 PROCEDURE — 250N000013 HC RX MED GY IP 250 OP 250 PS 637: Performed by: NURSE PRACTITIONER

## 2021-02-16 PROCEDURE — 250N000011 HC RX IP 250 OP 636: Performed by: PHYSICIAN ASSISTANT

## 2021-02-16 PROCEDURE — 999N000157 HC STATISTIC RCP TIME EA 10 MIN

## 2021-02-16 PROCEDURE — 250N000013 HC RX MED GY IP 250 OP 250 PS 637: Performed by: STUDENT IN AN ORGANIZED HEALTH CARE EDUCATION/TRAINING PROGRAM

## 2021-02-16 PROCEDURE — 258N000003 HC RX IP 258 OP 636: Performed by: STUDENT IN AN ORGANIZED HEALTH CARE EDUCATION/TRAINING PROGRAM

## 2021-02-16 PROCEDURE — 200N000002 HC R&B ICU UMMC

## 2021-02-16 PROCEDURE — 94003 VENT MGMT INPAT SUBQ DAY: CPT

## 2021-02-16 PROCEDURE — 80048 BASIC METABOLIC PNL TOTAL CA: CPT | Performed by: NURSE PRACTITIONER

## 2021-02-16 PROCEDURE — 85027 COMPLETE CBC AUTOMATED: CPT | Performed by: NURSE PRACTITIONER

## 2021-02-16 PROCEDURE — 250N000009 HC RX 250: Performed by: PHYSICIAN ASSISTANT

## 2021-02-16 PROCEDURE — 272N000583 ZZ HC TUBE GASTRO CR12

## 2021-02-16 PROCEDURE — 94640 AIRWAY INHALATION TREATMENT: CPT

## 2021-02-16 PROCEDURE — 250N000009 HC RX 250: Performed by: STUDENT IN AN ORGANIZED HEALTH CARE EDUCATION/TRAINING PROGRAM

## 2021-02-16 PROCEDURE — 99291 CRITICAL CARE FIRST HOUR: CPT | Mod: GC | Performed by: INTERNAL MEDICINE

## 2021-02-16 PROCEDURE — 49440 PLACE GASTROSTOMY TUBE PERC: CPT | Mod: GC | Performed by: RADIOLOGY

## 2021-02-16 PROCEDURE — C1887 CATHETER, GUIDING: HCPCS

## 2021-02-16 RX ORDER — IODIXANOL 320 MG/ML
50 INJECTION, SOLUTION INTRAVASCULAR ONCE
Status: COMPLETED | OUTPATIENT
Start: 2021-02-16 | End: 2021-02-16

## 2021-02-16 RX ORDER — FENTANYL CITRATE 50 UG/ML
25-50 INJECTION, SOLUTION INTRAMUSCULAR; INTRAVENOUS EVERY 5 MIN PRN
Status: DISCONTINUED | OUTPATIENT
Start: 2021-02-16 | End: 2021-02-16 | Stop reason: HOSPADM

## 2021-02-16 RX ORDER — FLUMAZENIL 0.1 MG/ML
0.2 INJECTION, SOLUTION INTRAVENOUS
Status: DISCONTINUED | OUTPATIENT
Start: 2021-02-16 | End: 2021-02-16 | Stop reason: HOSPADM

## 2021-02-16 RX ORDER — NALOXONE HYDROCHLORIDE 0.4 MG/ML
0.2 INJECTION, SOLUTION INTRAMUSCULAR; INTRAVENOUS; SUBCUTANEOUS
Status: DISCONTINUED | OUTPATIENT
Start: 2021-02-16 | End: 2021-02-16 | Stop reason: HOSPADM

## 2021-02-16 RX ORDER — NALOXONE HYDROCHLORIDE 0.4 MG/ML
0.4 INJECTION, SOLUTION INTRAMUSCULAR; INTRAVENOUS; SUBCUTANEOUS
Status: DISCONTINUED | OUTPATIENT
Start: 2021-02-16 | End: 2021-02-16 | Stop reason: HOSPADM

## 2021-02-16 RX ORDER — CEFAZOLIN SODIUM 2 G/100ML
2 INJECTION, SOLUTION INTRAVENOUS
Status: DISCONTINUED | OUTPATIENT
Start: 2021-02-17 | End: 2021-02-16 | Stop reason: HOSPADM

## 2021-02-16 RX ADMIN — PREDNISONE 20 MG: 20 TABLET ORAL at 08:33

## 2021-02-16 RX ADMIN — INSULIN GLARGINE 10 UNITS: 100 INJECTION, SOLUTION SUBCUTANEOUS at 08:33

## 2021-02-16 RX ADMIN — TACROLIMUS 2.5 MG: 5 CAPSULE ORAL at 17:47

## 2021-02-16 RX ADMIN — MIDAZOLAM 0.5 MG: 1 INJECTION INTRAMUSCULAR; INTRAVENOUS at 15:07

## 2021-02-16 RX ADMIN — TACROLIMUS 2 MG: 5 CAPSULE ORAL at 08:33

## 2021-02-16 RX ADMIN — FENTANYL CITRATE 25 MCG: 50 INJECTION, SOLUTION INTRAMUSCULAR; INTRAVENOUS at 15:07

## 2021-02-16 RX ADMIN — OLANZAPINE 10 MG: 2.5 TABLET ORAL at 21:02

## 2021-02-16 RX ADMIN — ISODIUM CHLORIDE 3 ML: 0.03 SOLUTION RESPIRATORY (INHALATION) at 19:43

## 2021-02-16 RX ADMIN — ALBUTEROL SULFATE 2.5 MG: 2.5 SOLUTION RESPIRATORY (INHALATION) at 03:04

## 2021-02-16 RX ADMIN — METOPROLOL TARTRATE 50 MG: 25 TABLET, FILM COATED ORAL at 08:32

## 2021-02-16 RX ADMIN — ACETYLCYSTEINE 2 ML: 200 SOLUTION ORAL; RESPIRATORY (INHALATION) at 09:33

## 2021-02-16 RX ADMIN — INSULIN ASPART 1 UNITS: 100 INJECTION, SOLUTION INTRAVENOUS; SUBCUTANEOUS at 08:33

## 2021-02-16 RX ADMIN — MIDAZOLAM 0.5 MG: 1 INJECTION INTRAMUSCULAR; INTRAVENOUS at 15:20

## 2021-02-16 RX ADMIN — DEXTROSE MONOHYDRATE 1000 ML: 100 INJECTION, SOLUTION INTRAVENOUS at 00:00

## 2021-02-16 RX ADMIN — Medication 1 PACKET: at 21:03

## 2021-02-16 RX ADMIN — BUDESONIDE 0.5 MG: 0.5 INHALANT ORAL at 09:33

## 2021-02-16 RX ADMIN — SODIUM CHLORIDE 300 MG: 9 INJECTION, SOLUTION INTRAVENOUS at 08:48

## 2021-02-16 RX ADMIN — ACETAMINOPHEN 325 MG: 325 TABLET, FILM COATED ORAL at 22:03

## 2021-02-16 RX ADMIN — NYSTATIN 1000000 UNITS: 500000 SUSPENSION ORAL at 11:51

## 2021-02-16 RX ADMIN — ACETYLCYSTEINE 2 ML: 200 SOLUTION ORAL; RESPIRATORY (INHALATION) at 19:43

## 2021-02-16 RX ADMIN — INSULIN ASPART 3 UNITS: 100 INJECTION, SOLUTION INTRAVENOUS; SUBCUTANEOUS at 16:37

## 2021-02-16 RX ADMIN — INSULIN ASPART 1 UNITS: 100 INJECTION, SOLUTION INTRAVENOUS; SUBCUTANEOUS at 21:00

## 2021-02-16 RX ADMIN — ACETAMINOPHEN 325 MG: 325 TABLET, FILM COATED ORAL at 17:47

## 2021-02-16 RX ADMIN — LIDOCAINE HYDROCHLORIDE 9 ML: 10 INJECTION, SOLUTION EPIDURAL; INFILTRATION; INTRACAUDAL; PERINEURAL at 15:28

## 2021-02-16 RX ADMIN — METOPROLOL TARTRATE 50 MG: 25 TABLET, FILM COATED ORAL at 21:02

## 2021-02-16 RX ADMIN — ALBUTEROL SULFATE 2.5 MG: 2.5 SOLUTION RESPIRATORY (INHALATION) at 09:33

## 2021-02-16 RX ADMIN — IODIXANOL 20 ML: 320 INJECTION, SOLUTION INTRAVASCULAR at 15:44

## 2021-02-16 RX ADMIN — NYSTATIN 1000000 UNITS: 500000 SUSPENSION ORAL at 08:33

## 2021-02-16 RX ADMIN — Medication 40 MG: at 08:33

## 2021-02-16 RX ADMIN — GLUCAGON HYDROCHLORIDE 0.5 MG: KIT at 15:13

## 2021-02-16 RX ADMIN — BUDESONIDE 0.5 MG: 0.5 INHALANT ORAL at 19:42

## 2021-02-16 RX ADMIN — ALBUTEROL SULFATE 2.5 MG: 2.5 SOLUTION RESPIRATORY (INHALATION) at 19:42

## 2021-02-16 RX ADMIN — FENTANYL CITRATE 25 MCG: 50 INJECTION, SOLUTION INTRAMUSCULAR; INTRAVENOUS at 15:20

## 2021-02-16 ASSESSMENT — MIFFLIN-ST. JEOR: SCORE: 1120

## 2021-02-16 ASSESSMENT — ACTIVITIES OF DAILY LIVING (ADL)
ADLS_ACUITY_SCORE: 15
ADLS_ACUITY_SCORE: 16
ADLS_ACUITY_SCORE: 16
ADLS_ACUITY_SCORE: 15
ADLS_ACUITY_SCORE: 16

## 2021-02-16 NOTE — PROGRESS NOTES
Patient Name: Kecia Blue  Medical Record Number: [id number]  Today's Date: 2/16/2021    Procedure: GJ tube placement  Proceduralist: Dr Hilario Maurice    Procedure Start: 1510  Procedure end: 1535  Sedation medications administered: 50 mcg fentanyl and 1 mg versed     Report given to: Yenni GIL  : n/a    Other Notes: Pt arrived to IR room 1 from . Consent reviewed. Pt denies any questions or concerns regarding procedure. Pt positioned supine and monitored per protocol. NJ pulled back to  and resecured for placement of GJ tube.  Pt tolerated procedure without any noted complications. Pt transferred back to .

## 2021-02-16 NOTE — PRE-PROCEDURE
GENERAL PRE-PROCEDURE:   Procedure:  IR GASTRO JEJUNOSTOMY TUBE CHANGE  Date/Time:  2/16/2021 2:49 PM    Written consent obtained?: Yes    Risks and benefits: Risks, benefits and alternatives were discussed    Consent given by:  Patient  Patient states understanding of procedure being performed: Yes    Patient's understanding of procedure matches consent: Yes    Procedure consent matches procedure scheduled: Yes    Expected level of sedation:  Moderate  Appropriately NPO:  Yes  ASA Class:  Class 3- Severe systemic disease, definite functional limitations  Mallampati  :  N/A- Alternate secured airway (trach)  Lungs:  Lungs clear with good breath sounds bilaterally  Heart:  Normal heart sounds with tachycardia  History & Physical reviewed:  History and physical reviewed and no updates needed  Statement of review:  I have reviewed the lab findings, diagnostic data, medications, and the plan for sedation

## 2021-02-16 NOTE — PROGRESS NOTES
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 Found to have PCP pneumonia.    CHANGES and MAJOR THINGS TODAY:   - Plan for IR PEG-J tube placement this afternoon  - Plan for trach collar as tolerated   - Suture trach removal POD 7 (on 2/19)  - Increase activity, up to chair, PT following  - iHD yesterday with 3.5L removed, tolerated  - IP, planning R mainstem dilation this week  - Discharge planning to LTACH vs TCU     PLAN:  Neuro:  # Pain and sedation  - Off Precedex post tracheostomy   - prn hydroxyzine for anxiety    # Delirium- resolved   # Encephalopathy, septic- resolved   - Scheduled 10 mg at bedtime and PRN Zyprexa     # Insomnia  - Melatonin available prn    # Anisocoria (L>R)  Intermittently fixed and dilated pupils. CTH unremarkable. Neuro examination unremarkable.  - Opthomalogy evaluated on 2/1    Pulmonary:  # Acute Hypoxic Respiratory Failure  # Tracheostomy (2/12/21)  Suspected 2/2 PJP initially. OSH CT 1/23 showed bilateral ground glass opacities and consolidative lung infiltrates centrally distributed to the upper lobes and RLL. COVID negative x3 at outside hospital. Concern for secondary pneumonia given worsening clinical picture. 2/4 CT chest indicated worsening consolidations consistent with progression of pneumocystis with possibilty of superimposed secondary pneumonia. Previously on inhaled epoprostenol. S/p Perc tracheostomy 2/12 with interventional pulm. Mechanical ventilation AC 18/320/+8/50% -  dropped RR to 18 from 24; wean FiO2 to 40% and then PEEP to 5 after tracheostomy 2/12. Last dose of Zosyn 1/12.   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  - Positive PJP PCR on 1/28 with positive fungitel   - Continue PTA tacrolimus  - Prednisone taper, holding home dose for now  - Pulmonary lung transplant team consulted and following, appreciate recs - may need dilation and/or stent after acute illness has resolved  - IP, planning R mainstem dilation this week    Cardiovascular:  # Shock, resolved  # Transient hypotension related to dialysis, sleep- Resolved  Last ECHO 10/21/20 showed EF 60-65%, normal LV and RV function.   - OFF norepinephrine  - D/c midodrine to 10 mg q8 hours    # Afib with RVR, resolved  # ST  Intermittently going in and out of A-fib, currently ST.  - Continue to monitor  - Metoprolol 12.5mg BID, increase dose to 25mg BID    # Hx HTN  - Hold PTA carvedilol and amlodipine    GI/Nutrition:  # Portal HTN  Liver biopsy positive for congestive hepatopathy. Previous had ascites thought to be r/t elevated right sided heart pressures. Last saw GI on 12/21/20 and patient endorsed that her ascites is no longer present. Etiology unclear.   - NTD    #Nutrition  - NJT placed by radiology, continue TF  - PEG-J next week, tentatively scheduled for 2/16  - NPO after midnight  and HOLD heparin evening of 2/15  - Daily fiber via NG  tube      # Constipation-resolved and now with loose stools  - Hx of constipation with bactrim, reports frequent BMs  -  Change schedule bowel regimen to PRN, due to multiple loose stools     Renal/Fluids/Electrolytes:  # ESRD on iHD twice weekly(M/Th)  # Anion gap metabolic acidosis r/t ESRD, uremia, resolved  Outpatient dry body weight 56.5 kg. CRRT 2/4 to 2/8. Last HD 2/11 for 2L off   - Nephrology following   - iHD per nephrology   - I&O  - Daily weights   - Trend BMP     # Hypomagnesemia- resolved  - Replace Mag x1    Endocrine:  # Hyperglycemia, steroid induced  - High intensity sliding scale insulin  - 10 units glargine daily    # Seronegative RA with Raynaud's   - NTD, monitor     ID:  # Sepsis in setting of PJP pneumonia, possible secondary pneumonia  Suspected related to PJP pneumonia. OSH CT 1/23 + bilateral ground glass opacities and consolidative lung infiltrates centrally distributed to the upper lobes and RLL. Covid-19 negative X3 at OSH. Procalcitonin negative. Urine strep, CMV, and RPP negative. Given acute and severe decompensation, concern for secondary pneumonia. 2/4 CT chest which displayed worsening consolidations consistent with progression of pneumocystis with the possibility of superimposed secondary pneumonia.  - PJP PCR positive, fungitell positive as well  - Last dose of Bactrim 21 days today,  switch to ppx dose  2/15; prednisone taper  - Zosyn 10-day course  completed on 2/12  - s/p thora 1/25, fluid exudative, negative w/u  - Weekly COVID test per hospital policy, next 2/19    # Chronic/Stable right aspergillus empyema   - Continue posaconazole, treatment dosing, will monitor QTc intermittently (431 on 2/11)  - Posaconazole level ordered for 2/16   -consider transitioning to oral once gj tube placed    Hematology:    # Anemia r/t renal disease, chronic stable   # Thrombocytopenia r/t critical illness  # Leukocytosis, infection/steroids  Takes Aranesp 25 mcg every four weeks, last dose 1/14, next scheduled was 2/11.   Currently receiving epoetin alisha-epox with dialysis   - Discussed with nephrology, will  add replacement with HD run on 2/13   -iHD today  - S/p 1PRBC transfusion for hgb 6.7, recheck 8.4, no sign of bleeding. Goal to minimize ICU lab draws as much as possible     Musculoskeletal:  # Generalized weakness  - PT/OT  - Increase activity, up to chair, PT following    Skin:  # Dermatomyositis antitryptase syndrome   - Noted, NTD    General Cares/Prophylaxis:    DVT Prophylaxis: Heparin subcutaneous (will hold for procedure)   GI Prophylaxis: PPI  Restraints: none  Family Communication:  updated at the bedside  Code Status: full    Lines/tubes/drains:  - trach  - HD fistula left forearm  - CVC Left IJ  - Art line - to be removed 2/14  - NJT    Disposition:  - Medical ICU     Patient seen and findings/plan discussed with medical ICU staff, Dr. Robert Quinones MD  Internal Medicine, PGY-1  [facility]   Pager: [phone]    ====================================  INTERVAL HISTORY:   No acute events overnight. Had nausea and x2 emesis. Nausea improved without medication. Denies  pain or any other concerns.   at the bedside.     OBJECTIVE:   1. VITAL SIGNS:   Temp:  [97.9  F (36.6  C)-98.4  F (36.9  C)] 97.9  F (36.6  C)  Pulse:  [] 94  Resp:  [18-30] 21  BP: ()/(54-98) 120/77  FiO2 (%):  [30 %-40 %] 30 %  SpO2:  [86 %-100 %] 97 %  Ventilation Mode: CMV/AC  (Continuous Mandatory Ventilation/ Assist Control)  FiO2 (%): 30 %  Rate Set (breaths/minute): 18 breaths/min  Tidal Volume Set (mL): 320 mL  PEEP (cm H2O): 5 cmH2O  Pressure Support (cm H2O): 5 cmH2O  Oxygen Concentration (%): 40 %  Resp: 21    2. INTAKE/ OUTPUT:   I/O last 3 completed shifts:  In: 2515 [I.V.:530; NG/GT:920]  Out: 3500 [Other:3500] Urine Mixed with stool, unable to monitor accurately per nursing staff    3. PHYSICAL EXAMINATION:  General: Laying in bed, intubated via trachostomy and alert  Neuro: eyes open, following commands, alert and oriented x4  Pulm/Resp: Coarse breath sounds  bilaterally, breathing non-labored  CV: ST. Pulses intact. No edema.   Abdomen: Soft, non-distended, non-tender, bowel sounds active.   Incisions/Skin: Left AV fistula with thrill and bruit    4. LABS:   Arterial Blood Gases   Recent Labs   Lab 02/10/21  2000 02/10/21  1555 02/09/21  1225   PH 7.41 7.36 7.38   PCO2 42 46* 49*   PO2 90 105 74*   HCO3 26 26 29*     Complete Blood Count   Recent Labs   Lab 02/16/21  0612 02/15/21  1026 02/15/21  0418 02/14/21  0336 02/13/21  0323   WBC 4.0  --  3.7* 5.3 7.2   HGB 8.7* 8.4* 6.7* 7.5* 7.8*   *  --  116* 109* 114*     Basic Metabolic Panel  Recent Labs   Lab 02/16/21  0612 02/15/21  0418 02/14/21  0336 02/13/21  0323    136 136 134   POTASSIUM 3.6 3.9 3.8 4.2   CHLORIDE 99 100 100 100   CO2 28 27 29 26   BUN 34* 52* 32* 52*   CR 2.27* 2.75* 1.84* 2.59*   * 155* 149* 142*     Liver Function Tests  Recent Labs   Lab 02/11/21  0645 02/09/21  1615   AST  --  6   ALT  --  32   ALKPHOS  --  218*   BILITOTAL  --  0.4   ALBUMIN  --  2.0*   INR 1.07  --      Coagulation Profile  Recent Labs   Lab 02/12/21  0315 02/11/21  0645   INR  --  1.07   PTT 28  --        5. RADIOLOGY:   No results found for this or any previous visit (from the past 24 hour(s)).

## 2021-02-16 NOTE — PLAN OF CARE
Major Shift Events:  Pt had nausea and one episode of emesis overnight. Pt declined zofran and has had no complaints since.   Plan: Plan for PEG placement today as well as a bronch in OR.  For vital signs and complete assessments, please see documentation flowsheets.        Problem: Adult Inpatient Plan of Care  Goal: Absence of Hospital-Acquired Illness or Injury  Intervention: Identify and Manage Fall Risk  Recent Flowsheet Documentation  Taken 2/16/2021 0400 by Fortunato Hannah RN  Safety Promotion/Fall Prevention: fall prevention program maintained  Taken 2/16/2021 0000 by Fortunato Hannah RN  Safety Promotion/Fall Prevention: fall prevention program maintained     Problem: Adult Inpatient Plan of Care  Goal: Absence of Hospital-Acquired Illness or Injury  Intervention: Prevent Skin Injury  Recent Flowsheet Documentation  Taken 2/16/2021 0400 by Fortunato Hannah RN  Body Position: position changed independently  Taken 2/16/2021 0000 by Fortunato Hannah RN  Body Position: position changed independently     Problem: Adult Inpatient Plan of Care  Goal: Absence of Hospital-Acquired Illness or Injury  Intervention: Prevent and Manage VTE (Venous Thromboembolism) Risk  Recent Flowsheet Documentation  Taken 2/16/2021 0400 by Fortunato Hannah RN  VTE Prevention/Management: ambulation promoted  Taken 2/16/2021 0000 by Fortunato Hannah RN  VTE Prevention/Management: ambulation promoted     Problem: Adult Inpatient Plan of Care  Goal: Absence of Hospital-Acquired Illness or Injury  Intervention: Prevent Infection  Recent Flowsheet Documentation  Taken 2/16/2021 0400 by Fortunato Hannah RN  Infection Prevention: rest/sleep promoted  Taken 2/16/2021 0000 by Fortunato Hannah RN  Infection Prevention: rest/sleep promoted     Problem: ARDS (Acute Respiratory Distress Syndrome)  Goal: Effective Oxygenation  Outcome: No Change     Problem: ARDS (Acute Respiratory Distress Syndrome)  Goal: Effective Oxygenation  Intervention: Optimize  Oxygenation, Ventilation and Perfusion  Flowsheets (Taken 2/9/2021 0400 by Muna Zamarripa RN)  Airway/Ventilation Management:    airway patency maintained    calming measures promoted    humidification applied    pulmonary hygiene promoted     Problem: Hypertension Comorbidity  Goal: Blood Pressure in Desired Range  Outcome: No change     Problem: Hypertension Comorbidity  Goal: Blood Pressure in Desired Range  Intervention: Maintain Blood Pressure Management  Flowsheets  Taken 2/16/2021 0518  Medication Review/Management:    medications reviewed  Taken 2/16/2021 0400  Medication Review/Management: medications reviewed  Taken 2/16/2021 0000  Medication Review/Management: medications reviewed     Problem: Gas Exchange Impaired  Goal: Optimal Gas Exchange  Intervention: Optimize Oxygenation and Ventilation  Recent Flowsheet Documentation  Taken 2/16/2021 0400 by Fortunato Hannah, RN  Head of Bed (HOB) Positioning: HOB at 30 degrees  Taken 2/16/2021 0000 by Fortunato Hannah, RN  Head of Bed (HOB) Positioning: HOB at 30 degrees     Problem: Fluid Imbalance (Pneumonia)  Goal: Fluid Balance  Outcome: No Change     Problem: Fluid Imbalance (Pneumonia)  Goal: Fluid Balance  Intervention: Monitor and Manage Fluid Balance  Flowsheets (Taken 2/16/2021 0518)  Fluid/Electrolyte Management: fluids restricted     Problem: Infection (Pneumonia)  Goal: Resolution of Infection Signs and Symptoms  Intervention: Prevent Infection Progression  Recent Flowsheet Documentation  Taken 2/16/2021 0400 by Fortunato Hannah, RN  Isolation Precautions: contact precautions maintained  Taken 2/16/2021 0000 by Fortunato Hannah, RN  Isolation Precautions: contact precautions maintained     Problem: Respiratory Compromise (Pneumonia)  Goal: Effective Oxygenation and Ventilation  Outcome: No Change     Problem: Respiratory Compromise (Pneumonia)  Goal: Effective Oxygenation and Ventilation  Intervention: Promote Airway Secretion Clearance  Recent Flowsheet  Documentation  Taken 2/16/2021 0400 by Fortunato Hannah, RN  Cough And Deep Breathing: done independently per patient  Taken 2/16/2021 0000 by Fortunato Hannah, RN  Cough And Deep Breathing: done independently per patient     Problem: Respiratory Compromise (Pneumonia)  Goal: Effective Oxygenation and Ventilation  Intervention: Optimize Oxygenation and Ventilation  Flowsheets  Taken 2/16/2021 0400 by Fortunato Hannah, RN  Head of Bed (HOB) Positioning: HOB at 30 degrees  Taken 2/16/2021 0000 by Fortunato Hannah RN  Head of Bed (HOB) Positioning: HOB at 30 degrees  Taken 2/9/2021 0400 by Muna Zamarripa RN  Airway/Ventilation Management:    airway patency maintained    calming measures promoted    humidification applied    pulmonary hygiene promoted

## 2021-02-16 NOTE — PROGRESS NOTES
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 Nephrology consutled for dialysis needs.       Assessment and  Recommendation (MARIA A)    Mrs Blue is a 60 yof with ESRD and hx of lung tx admitted with PNA. Had been on 2x/week HD prior to admission, now admitted on 1/24/2021 for management of resp failure.  Nephrology consulted for management of dialysis.      Melissa Estevez on 2/16/2021 at 11:14 AM   I have communicated the plan with the primary team via verbal communication.     ADRIÁN Lo CNS  Clinical Nurse Specialist  [phone]            Medical Student Interval History MARIA A Interval History   Medical student: Interval History  Mrs. Blue is doing well this AM. Had an HD run yesterday and tolerated getting 3.5L off.   Labs currently stable with Cr at 2.27 and Hgb up to 8.7 after RBC given yesterday. Vitals are stable today. Will take a break from HD and have another run tomorrow with goal of 3L off. Will have PEG placed sometime this week and then can look at options for discharge. Had a conversation with her and  about options for dialysis once hospital stay is over. Discussed home options versus continuing with dialysis center. Understands that she will likely need dialysis 3 times per week vs the 2 times per week that she was running prior to this injury.     Review of Systems  Gen: No fevers or chills  CV: No CP at rest  Resp: No SOB at rest  GI: No N/V    Mrs Blue continues on every other day dialysis, able to pull 3.5L  "of UF with run yesterday, still with some abdominal edema and is up in wt so will continue to try to pull fairly aggressively with runs.  Waiting for PEG placement then can likely transition to TCU.  Will plan for 3L of UF with run tomorrow, may lengthen run to achieve if needed.          Review of Systems:   Gen: No fevers or chills  CV: No CP at rest  Resp: No SOB at rest  GI: No N/V        Physical Exam (Student)  \"Vitals were reviewed\"  /74   Pulse 114   Temp 96.8  F (36  C) (Axillary)   Resp 22   Ht 1.6 m (5' 2.99\")   Wt 57.1 kg (125 lb 14.1 oz)   LMP 06/07/2014 (Exact Date)   SpO2 96%   BMI 22.30 kg/m      Intake/Output Summary (Last 24 hours) at 2/16/2021 1113  Last data filed at 2/16/2021 1100  Gross per 24 hour   Intake 2200 ml   Output 3500 ml   Net -1300 ml      GENERAL APPEARANCE: Vent via trached.   EYES: no scleral icterus  Endo: no moon facies  Pulmonary: equal expansion.    CV: regular rhythm, normal rate - Edema present  GI: soft, non distended  MS: no evidence of inflammation in joints, no muscle tenderness  :  no rivas, making very little urine  SKIN: warm, dry, +1 edema.    NEURO: No focal deficits.     Physical Exam (Physician)  Vitals were reviewed  /74   Pulse 114   Temp 96.8  F (36  C) (Axillary)   Resp 22   Ht 1.6 m (5' 2.99\")   Wt 57.1 kg (125 lb 14.1 oz)   LMP 06/07/2014 (Exact Date)   SpO2 96%   BMI 22.30 kg/m       Intake/Output Summary (Last 24 hours) at 2/16/2021 1113  Last data filed at 2/16/2021 1100  Gross per 24 hour   Intake 2200 ml   Output 3500 ml   Net -1300 ml        GENERAL APPEARANCE: Vent via trached.   EYES: no scleral icterus  Endo: no moon facies  Pulmonary: equal expansion.    CV: regular rhythm, normal rate - Edema present  GI: soft, non distended  MS: no evidence of inflammation in joints, no muscle tenderness  :  no rivas, making very little urine  SKIN: warm, dry, +1 edema.    NEURO: No focal deficits.        Labs:   The following " labs were reviewed:   CMP  Recent Labs   Lab 02/16/21  0612 02/15/21  0418 02/14/21  0336 02/13/21  0323 02/12/21  0315 02/09/21  1615 02/09/21  1615    136 136 134 135   < > 135   POTASSIUM 3.6 3.9 3.8 4.2 4.1   < > 4.3   CHLORIDE 99 100 100 100 100   < > 102   CO2 28 27 29 26 28   < > 28   ANIONGAP 7 8 7 8 7   < > 5   * 155* 149* 142* 175*   < > 153*   BUN 34* 52* 32* 52* 33*   < > 20   CR 2.27* 2.75* 1.84* 2.59* 1.71*   < > 1.08*   GFRESTIMATED 23* 18* 30* 20* 32*   < > 56*   GFRESTBLACK 27* 21* 34* 23* 38*   < > 65   CHELSEY 8.9 8.6 8.6 8.7 8.6   < > 8.3*   MAG  --  1.9 1.8 2.4* 1.7   < >  --    PHOS  --  4.5 3.6 5.3* 3.6   < >  --    PROTTOTAL  --   --   --   --   --   --  6.1*   ALBUMIN  --   --   --   --   --   --  2.0*   BILITOTAL  --   --   --   --   --   --  0.4   ALKPHOS  --   --   --   --   --   --  218*   AST  --   --   --   --   --   --  6   ALT  --   --   --   --   --   --  32    < > = values in this interval not displayed.     CBC  Recent Labs   Lab 02/16/21  0612 02/15/21  1026 02/15/21  0418 02/14/21  0336 02/13/21  0323   HGB 8.7* 8.4* 6.7* 7.5* 7.8*   WBC 4.0  --  3.7* 5.3 7.2   RBC 2.87*  --  2.25* 2.46* 2.52*   HCT 27.4*  --  21.9* 24.0* 23.9*   MCV 96  --  97 98 95   MCH 30.3  --  29.8 30.5 31.0   MCHC 31.8  --  30.6* 31.3* 32.6   RDW 21.1*  --  21.8* 21.2* 20.1*   *  --  116* 109* 114*     INR  Recent Labs   Lab 02/12/21  0315 02/11/21  0645   INR  --  1.07   PTT 28  --      ABG  Recent Labs   Lab 02/10/21  2000 02/10/21  1555 02/09/21  1225   PH 7.41 7.36 7.38   PCO2 42 46* 49*   PO2 90 105 74*   HCO3 26 26 29*   O2PER 50 50 60      URINE STUDIES  Recent Labs   Lab Test 01/24/21  1729 10/21/19  2240 09/12/19  0125 08/07/19  1512   COLOR Yellow Yellow Light Yellow Yellow   APPEARANCE Slightly Cloudy Cloudy Clear Clear   URINEGLC Negative Negative Negative Negative   URINEBILI Negative Negative Negative Negative   URINEKETONE 5* Negative 10* Negative   SG 1.023 1.018 1.008  1.016   UBLD Small* Trace* Negative Negative   URINEPH 5.0 5.0 7.5* 7.0   PROTEIN 30* 30* 30* 100*   NITRITE Negative Negative Negative Negative   LEUKEST Moderate* Large* Negative Trace*   RBCU 26* 25* <1 10*   WBCU 34* 115* 3 19*     Recent Labs   Lab Test 08/07/19  1512   UTPG 2.47*     PTH  No lab results found.  IRON STUDIES  Recent Labs   Lab Test 02/03/21  0415 12/13/18  1033 08/01/18  0921 05/08/18  0709   IRON 51 16* 93 48   * 221* 248 275   IRONSAT 36 7* 37 18   YOLA  --  302* 571*  --      Imaging:      Current Medications:    acetylcysteine  2 mL Nebulization BID     albuterol  2.5 mg Nebulization Q6H     B and C vitamin Complex with folic acid  5 mL Oral or Feeding Tube Daily     budesonide  0.5 mg Nebulization BID     fiber modular (NUTRISOURCE FIBER)  1 packet Per Feeding Tube Daily     [Held by provider] heparin ANTICOAGULANT  5,000 Units Subcutaneous Q8H     insulin aspart  1-12 Units Subcutaneous Q4H     insulin glargine  10 Units Subcutaneous QAM AC     metoprolol tartrate  50 mg Oral or Feeding Tube BID     nystatin  1,000,000 Units Mouth/Throat 4x Daily     OLANZapine  10 mg Oral or Feeding Tube Q24H     pantoprazole  40 mg Oral QAM AC     posaconazole (NOXAFIL) intermittent infusion  300 mg Intravenous Daily     [Held by provider] predniSONE  2.5 mg Oral or Feeding Tube QPM     predniSONE  20 mg Oral Daily     [Held by provider] predniSONE  5 mg Oral or Feeding Tube QAM     protein modular  1 packet Per Feeding Tube BID     sodium chloride (PF)  3 mL Intracatheter Q8H     sodium chloride  3 mL Nebulization BID     sulfamethoxazole-trimethoprim  10 mL Oral or Feeding Tube Once per day on Mon Wed Fri     tacrolimus  2 mg Oral or Feeding Tube Q24H     tacrolimus  2.5 mg Oral or Feeding Tube Q24H     valGANciclovir  450 mg Oral Once per day on Mon Thu       dextrose 45 mL/hr at 02/16/21 0700     - MEDICATION INSTRUCTIONS -       - MEDICATION INSTRUCTIONS -       sodium chloride 0.9%        sodium chloride 0.9%       sodium chloride 10 mL/hr at 02/08/21 0400     Melissa Estevez

## 2021-02-16 NOTE — PLAN OF CARE
ICU End of Shift Summary. See flowsheets for vital signs and detailed assessment.    Changes this shift: PST 5/5, 30%, since 0900. Trach domed 30L, 30% for two hours. PEG/J tube placed in IR. Tube feedings resumed, D10 infusion stopped. R. Internal jugular CVC removed.     Plan: Possible bronch this week. Dialysis tomorrow. LTACH workup.           Problem: Adult Inpatient Plan of Care  Goal: Plan of Care Review  2/16/2021 1723 by Yenni Sam, RN  Outcome: Improving  Flowsheets (Taken 2/16/2021 1723)  Plan of Care Reviewed With:   patient   spouse  2/16/2021 1722 by Yenni Sam, RN  Outcome: Improving     Problem: Infection (Pneumonia)  Goal: Resolution of Infection Signs and Symptoms  2/16/2021 1723 by Yenni Sam, RN  Outcome: Improving

## 2021-02-16 NOTE — PROGRESS NOTES
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  Patient was admitted to OSH on  for acute hypoxemic respiratory failure and new lung opacities. Intubated  and transferred to [facility] for ongoing management.  Extubated  but reintubated - for progressive hypoxemia.  Rapid decompensation 2/3 with ARDS presentation requiring reintubation, prone positioning, and inhaled epoprostenol.  Significant rise in leukocytosis (2/3) concerning for worsening PJP versus secondary infection. Now with improved oxygenation and doing PST.       Today's recommendations:  - Agree with trach dome trial   - Agree with PEG-J tube placement today   - Continue IV Posaconazole and check a level today, might change to enteral through the G tube after the GJ tube placed and if the level is therapeutic   - Continue albuterol and Mucomyst  given distal mucus plugging on bronch this past week        Acute hypoxemic respiratory failure:   Right post-obstructive Stenotrophomonas and Eikenella pneumonia:  PJP pneumonia:  Septic shock:   Airway stenosis with distal plugging:  ARDS: Presented to OSH ED with increased SOB and hypoxia, initially requiring 4L NC but continued to decline and subsequently intubated .  Hemodynamic instability after intubation requiring pressors, transferred to [facility].  Afebrile but with leukocytosis.  COVID and respiratory panel negative.  PTA bronch (20) with moderate airway obstruction  and RML/RLL mucous plugging, cultures with Stenotrophomonas and Eikenella (IV ceftazidime 1/13-1/19).  OSH CT chest with new multifocal GGO bilaterally and increased left loculated pleural effusion.  Repeat bronch (1/24) with RUL BAL with thick copious secretions to RML/RLL, PJP PCR positive.  Extubated on 1/26, but reintubated from 1/27-1/29 for progressive hypoxemia.  Remained on either HFNC % FiO2 or BiPAP % FiO2, then eventually decompensated on 2/3 and again reintubated.  Working revealing for ARDS, proning protocol initiated (2/3) with inhaled epoprostenol, pressors initiated.  Significant rise in leukocytosis (2/3) concerning for worsening PJP vs secondary infection.  CT chest (2/3) with progressive consolidation (consistent with ARDS) and possible superimposed infection findings, stable LLL chronic empyema.  CRRT initiated 2/4 as below.  CT chest 2/10 with improvement improvement in aeration bilaterally.  Bronch 2/11 with airway stenosis at right anastomosis  - ABX per MICU: Bactrim (1/24-2/15) for PJP and Steno (noted 12/23) coverage, empiric Zosyn (2/3-2/12)  -bactrim for the PJP pneumonia, completed 21 day course, this will cover the Eikenella and Stenothrophomonas he had on BAL from 12/23/2021  - Prednisone 20 mg once daily for until 2/18  - Chest physiotherapy QID and nebs: Albuterol QID and Mucomyst BID, and Pulmicort BID  - Trach 2/12  - PEG J planned 2/16  - Ventilator management per MICU     Aspergillus empyema (left-sided): First noted 10/8/19, negative in November and again on admission.  CT scan on 7/17/20 with increased mass-like density, likely pleural-based, in LLL area s/p needle aspiration (8/13/20) with Aspergillus fumigatus on cultures s/p intrapleural amphotericin bead placement (11/20).  Following with ID as OP.  LFTs stable on admission (ALP chronically mildly elevated).  CT chest with increased loculated left pleural effusion s/p thoracentesis (1/25), exudative with 87%  neutrophils, very high LDL (6308), cytology normal.   - AFB pleural cultures (1/25) NGTD--stain negative  - PTA posaconazole, PTA plan for indefinite course.  Changed to IV Posaconazole 2/9/2021 due to subtherapeutic level despite increasing the enteral Posaconazole  - Check Posaconazole level 2/16--ordered     S/p bilateral lung transplant for ILD 2/2 CTD:   Right mainstem bronchial stenosis (s/p dilation 3/2019): Last seen in pulmonary clinic 12/2. DSA (1/25) not detected.   -  IP to manage right bronchial stenosis, hold of dilation and stenting for now due to the infection      Immunosuppression: On 2 drug IST d/t recurrent infections  - Tacrolimus  Goal level 8-10, level 7.3 on 2/15, increased the dose to 2/2.5 mg and check a steady state level after 5 doses on 2/18  - Chronic prednisone dose on hold with above dose steroids (5 mg qAM / 2.5 mg qPM)  - anticipate transition on 2/18--will need to be taken off hold     Prophylaxis:   - Bactrim PPx dose as above     H/o CMV viremia: CMV D+/R+.  CMV <137 (1/25).  - VGCV for CMV ppx with stress dose steroids (1/26)      EBV viremia: Level 12/9/20 mildly elevated to 10K (log 4) from prior 3K (3.5 log) on 9/9/20, repeat (1/25/21) decreased to 5619 copies (log of 3.8).  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 Chronic iHD M/Th via AVF with partial graft.  Not able to tolerate iHD 2/3 d/t hypotension, line placed and transitioned to CRRT 2/4. Now back to iHD   - Monitoring of tacrolimus as above  - Dialysis management per nephrology     Atrial fibrillation w/ RVR: H/o paroxysmal AF, last 10/2019.  Recurrence 1/30 with RVR (-140s), likely in the setting of acute pulmonary illness.  - Management per MICU     Oropharyngeal candidiasis: White tongue plaque noted 2/1.  - Nystatin QID (2/1)      We appreciate the excellent care provided by the MICU team. Recommendations communicated via in person  "rounding and this note. Will continue to follow along closely, please do not hesitate to call with any questions or concerns.       Seen and discussed with Dr. Fermin Braun MD   Pulmonary and Critical Care Fellow   Pager [phone]      Subjective & Interval History:     Patient is doing well today, she tolerated the pressure support yesterday for 7 hour.  She is interactive and in good mood.    Review of Systems:     C: No fever, no chills  ENT/MOUTH: trach in place, no nasal congestion or drainage  RESP: See interval history  CV: No chest pain, no peripheral edema  GI: No nausea, no vomiting, no change in stools  : No urine  PSYCHIATRIC: Mood stable    Physical Exam:     Vital signs:  Temp: 97.9  F (36.6  C) Temp src: Oral BP: 120/77 Pulse: 94   Resp: 21 SpO2: 97 % O2 Device: Mechanical Ventilator Oxygen Delivery: 60 LPM Height: 160 cm (5' 2.99\") Weight: 57.1 kg (125 lb 14.1 oz)  I/O:       Intake/Output Summary (Last 24 hours) at 2/15/2021 1406  Last data filed at 2/15/2021 1300  Gross per 24 hour   Intake 2130 ml   Output --   Net 2130 ml         Constitutional: In no acute distress   HEENT: Eyes with pink conjunctivae, anicteric. Oral mucosa moist without lesions. Trach in place  PULM: Good air flow bilaterally. Rhonchi, no crackles or wheezing  CV: Normal S1 and S2. No murmur.  ABD: NABS, soft, nontender, nondistended.    MSK: Moves all extremities.   NEURO: Alert and oriented, able to interact while on vent  SKIN: Warm, dry. No rash on limited exam.   PSYCH: Mood stable.     Lines, Drains, and Devices:  Arterial Line 02/04/21 Brachial (Active)   Site Assessment WDL 02/13/21 0800   Line Status Pulsatile blood flow 02/13/21 0800   Arterine Line Cap Change Due 02/16/21 02/12/21 2000   Art Line Waveform Appropriate 02/13/21 0800   Art Line Interventions Leveled;Connections checked and tightened;Flushed per protocol 02/13/21 0800   Color/Movement/Sensation Capillary refill less than 3 sec 02/13/21 " 0800   Line Necessity Yes, meets criteria 02/13/21 0800   Dressing Type Transparent 02/13/21 0800   Dressing Status Clean, dry, intact 02/13/21 0800   Dressing Intervention Dressing changed/new dressing 02/07/21 0400   Dressing Change Due 02/14/21 02/13/21 0800   Number of days: 9       CVC Double Lumen 10/25/19 Right Internal jugular (Active)   Number of days: 477       Hemodialysis Vascular Access Arteriovenous fistula Left Arm (Active)   Site Assessment WDL except;Ecchymotic 02/13/21 0800   Cannulation Needle Size 15 02/11/21 1135   Dressing Intervention New dressing 02/11/21 1515   Dressing Status Clean, dry, intact 02/13/21 0800   Verification by x-ray Not applicable 02/09/21 1405   Hand Off Report Yes 02/11/21 1515   Number of days: 19       CVC TRIPLE LUMEN Right Internal jugular (Active)   Site Assessment WDL 02/13/21 0800   Dressing Type Chlorhexidine sponge;Transparent 02/13/21 0800   Dressing Status clean;dry;intact 02/13/21 0800   Dressing Intervention dressing changed 02/07/21 0400   Dressing Change Due 02/14/21 02/13/21 0800   Line Necessity yes, meets criteria 02/13/21 0800   Blue - Status infusing 02/13/21 0800   Blue - Cap Change Due 02/16/21 02/13/21 0800   Brown - Status infusing 02/13/21 0800   Brown - Cap Change Due 02/16/21 02/13/21 0800   White - Status saline locked 02/13/21 0800   White - Cap Change Due 02/08/21 02/08/21 0800   Phlebitis Scale 0-->no symptoms 02/13/21 0800   Infiltration? no 02/13/21 0800   Infiltration Scale 0 02/13/21 0800   Infiltration Site Treatment Method  None 02/11/21 1600   Was a vesicant infusing? no 02/11/21 1600   CVC Comment CDI 02/13/21 0800   Number of days: 20     Data:     LABS    CMP:   Recent Labs   Lab 02/16/21  0612 02/15/21  0418 02/14/21  0336 02/13/21  0323 02/12/21  0315 02/09/21  1615 02/09/21  1615    136 136 134 135   < > 135   POTASSIUM 3.6 3.9 3.8 4.2 4.1   < > 4.3   CHLORIDE 99 100 100 100 100   < > 102   CO2 28 27 29 26 28   < > 28    ANIONGAP 7 8 7 8 7   < > 5   * 155* 149* 142* 175*   < > 153*   BUN 34* 52* 32* 52* 33*   < > 20   CR 2.27* 2.75* 1.84* 2.59* 1.71*   < > 1.08*   GFRESTIMATED 23* 18* 30* 20* 32*   < > 56*   GFRESTBLACK 27* 21* 34* 23* 38*   < > 65   CHELSEY 8.9 8.6 8.6 8.7 8.6   < > 8.3*   MAG  --  1.9 1.8 2.4* 1.7   < >  --    PHOS  --  4.5 3.6 5.3* 3.6   < >  --    PROTTOTAL  --   --   --   --   --   --  6.1*   ALBUMIN  --   --   --   --   --   --  2.0*   BILITOTAL  --   --   --   --   --   --  0.4   ALKPHOS  --   --   --   --   --   --  218*   AST  --   --   --   --   --   --  6   ALT  --   --   --   --   --   --  32    < > = values in this interval not displayed.     CBC:   Recent Labs   Lab 02/16/21  0612 02/15/21  1026 02/15/21  0418 02/14/21  0336 02/13/21  0323   WBC 4.0  --  3.7* 5.3 7.2   RBC 2.87*  --  2.25* 2.46* 2.52*   HGB 8.7* 8.4* 6.7* 7.5* 7.8*   HCT 27.4*  --  21.9* 24.0* 23.9*   MCV 96  --  97 98 95   MCH 30.3  --  29.8 30.5 31.0   MCHC 31.8  --  30.6* 31.3* 32.6   RDW 21.1*  --  21.8* 21.2* 20.1*   *  --  116* 109* 114*       INR:   Recent Labs   Lab 02/11/21  0645   INR 1.07       Glucose:   Recent Labs   Lab 02/16/21  0612 02/16/21  0348 02/15/21  2349 02/15/21  1921 02/15/21  1526 02/15/21  1307 02/15/21  0752 02/15/21  0418 02/14/21  0336 02/14/21  0336 02/13/21  0323 02/13/21  0323 02/12/21  0315 02/12/21  0315 02/11/21  0400 02/11/21  0400   *  --   --   --   --   --   --  155*  --  149*  --  142*  --  175*  --  181*   BGM  --  127* 73 108* 175* 163* 161*  --    < >  --    < >  --    < > 173*   < >  --     < > = values in this interval not displayed.       Blood Gas:   Recent Labs   Lab 02/10/21  2000 02/10/21  1555 02/09/21  1225   O2PER 50 50 60       Culture Data   Recent Labs   Lab 02/09/21  1657 02/09/21  1649   CULT No growth No growth       Virology Data:   Lab Results   Component Value Date    FLUAH1 Not Detected 01/24/2021    FLUAH3 Not Detected 01/24/2021    QW6219 Not Detected  01/24/2021    IFLUB Not Detected 01/24/2021    RSVA Not Detected 01/24/2021    RSVB Not Detected 01/24/2021    PIV1 Not Detected 01/24/2021    PIV2 Not Detected 01/24/2021    PIV3 Not Detected 01/24/2021    HMPV Not Detected 01/24/2021    HRVS Negative 01/24/2021    ADVBE Negative 01/24/2021    ADVC Negative 01/24/2021    ADVC Negative 12/23/2020    ADVC Negative 10/07/2019       Historical CMV results (last 3 of prior testing):  Lab Results   Component Value Date    CMVQNT <137 (A) 01/25/2021    CMVQNT 238 (A) 01/24/2021    CMVQNT 675 (A) 12/23/2020     Lab Results   Component Value Date    CMVLOG <2.1 01/25/2021    CMVLOG 2.4 (H) 01/24/2021    CMVLOG 2.8 (H) 12/23/2020       Urine Studies    Recent Labs   Lab Test 01/24/21  1729 10/21/19  2240   URINEPH 5.0 5.0   NITRITE Negative Negative   LEUKEST Moderate* Large*   WBCU 34* 115*       Most Recent Breeze Pulmonary Function Testing (FVC/FEV1 only)  FVC-Pre   Date Value Ref Range Status   12/09/2020 1.12 L    09/09/2020 1.56 L    03/09/2020 1.57 L    02/19/2020 1.78 L      FVC-%Pred-Pre   Date Value Ref Range Status   12/09/2020 34 %    09/09/2020 47 %    03/09/2020 48 %    02/19/2020 54 %      FEV1-Pre   Date Value Ref Range Status   12/09/2020 0.98 L    09/09/2020 1.10 L    03/09/2020 0.96 L    02/19/2020 0.99 L      FEV1-%Pred-Pre   Date Value Ref Range Status   12/09/2020 37 %    09/09/2020 42 %    03/09/2020 37 %    02/19/2020 38 %        IMAGING    Recent Results (from the past 48 hour(s))   XR Chest Port 1 View    Narrative    EXAM: XR CHEST PORT 1 VW  2/12/2021 11:17 AM     HISTORY:  post trach placement & line placement       COMPARISON:  Chest x-ray and CT 2/9/2021    FINDINGS:   AP portable view of the chest. Tracheostomy tube tip projects in the  high thoracic trachea. Right IJ central venous catheter tip projects  in the superior cavoatrial junction. Enteric tube courses below the  diaphragm with tip outside the field-of-view.. Postoperative  changes  with intact median sternotomy wires and mediastinal surgical clips.  Bowel clips projecting over the right axilla. Demonstration of diffuse  patchy pulmonary opacities, most pronounced at the lung bases.      Impression    IMPRESSION:   1. Tracheostomy tube tip projects in the thoracic trachea. Additional  support devices are stable.  2. No definite change in the diffuse mixed interstitial and airspace  opacities with likely bilateral pleural effusions.    I have personally reviewed the examination and initial interpretation  and I agree with the findings.    ADAM GONZALEZ MD

## 2021-02-16 NOTE — PROGRESS NOTES
[facility] CRITICAL CARE STAFF NOTE    60 year old female with hx of ESRD, ILD with antsynthetase syndrome s/p BSLT 03/2018 (CMV D+/R+, EBV D/R+) postoperatively complicated by right mainstem bronchial stenosis s/p several dilations, left sided aspergillus empyema (10/2019), and CMV viremia who was admitted on 1/24/2021 from OSH for acute hypoxemic respiratory failure and new lung opacities, requiring vasopressors and intubation.     Overnight/Interim History:     No major events overnight.  Episode of emesis, improved with Zofran. Tolerated PS 5/5 for 7 hrs yesterday.    Acute Critical Care Issues/Key Findings:     Kecia Blue is critically ill due to acute on chronic hypoxemic respiratory failure.    Acute on chronic hypoxemic respiratory failure: Thought to be secondary to PJP PNA in setting BLST and right mainstem stenosis.  Has now completed 21 days of Bactrim.  Status post trach 2/12.  Tolerating SBT, did PS 5/5 for 7 hours yesterday.    Contiunue prophylaxis dose of Bactrim (completed 21 days of treatment)    Balloon dilatation of right mainstem to be done by IP at some point this week    Continue SBT twice daily (this a.m. doing well on PS 5/5)    Trial trach dome    Continue saline and Mucomyst nebs for secretion clearance    ICU sedation, history of anxiety: improving and feeling less anxious since trach placement.  Has been off of Precedex drip since tracheostomy placement.    Continue bedtime and as needed Zyprexa    History of bilateral sequential lung transplant (2018): complicated by right mainstem bronchial stenosis requiring balloon dilatation and stent placement in the past.  Appreciate pulmonary transplant team assistance.     Plan for right mainstem balloon dilatation with IP at some point this week    SBT trials as above, attempt trach dome today    Continue valganciclovir and posaconazole    Check posaconazole level today    Continue tacrolimus dose 2/2.5 mg (increased on  2/15)    Continue current prednisone dose for 1 more day and transition back to home dose    Nutrition: tolerating feeds via NJ.     PEG-J placement today    ESRD:     HD schedule per renal      Disposition: ICU, working on LTACH placement which may be difficult given she would need vent weaning and HD. Best case would be that she tolerates trach dome 24hrs/day and could be discharged to TCU.     Rest per resident note from today.     The patient was seen and examined with the resident/fellow physician.  We have discussed the patient in detail and I agree with the findings, assessment, and plan as documented in their note from today. The plan was formulated in conjunction with pharmacy, ICU nurses, and respiratory therapist. I have personally reviewed today's vital signs, medications, laboratory and imaging results. I have reviewed all consults that have been ordered and are active for this patient.      Critical Care Time: 35 min. I spent this time (excluding procedures) personally providing and directing critical care services at the bedside and on the critical care unit.      Date of Service (when I saw the patient): 02/16/21    Jacinto Jones MD   of Medicine  Division of Pulmonary, Allergy, Critical Care and Sleep Medicine   [facility]  Pager: [phone]

## 2021-02-17 ENCOUNTER — APPOINTMENT (OUTPATIENT)
Dept: PHYSICAL THERAPY | Facility: CLINIC | Age: 61
End: 2021-02-17
Attending: INTERNAL MEDICINE
Payer: COMMERCIAL

## 2021-02-17 ENCOUNTER — APPOINTMENT (OUTPATIENT)
Dept: OCCUPATIONAL THERAPY | Facility: CLINIC | Age: 61
End: 2021-02-17
Attending: INTERNAL MEDICINE
Payer: COMMERCIAL

## 2021-02-17 PROBLEM — J98.09 BRONCHIAL STENOSIS, RIGHT: Status: ACTIVE | Noted: 2021-01-24

## 2021-02-17 LAB
GLUCOSE BLDC GLUCOMTR-MCNC: 125 MG/DL (ref 70–99)
GLUCOSE BLDC GLUCOMTR-MCNC: 142 MG/DL (ref 70–99)
GLUCOSE BLDC GLUCOMTR-MCNC: 148 MG/DL (ref 70–99)
GLUCOSE BLDC GLUCOMTR-MCNC: 155 MG/DL (ref 70–99)
GLUCOSE BLDC GLUCOMTR-MCNC: 170 MG/DL (ref 70–99)
GLUCOSE BLDC GLUCOMTR-MCNC: 189 MG/DL (ref 70–99)
POSACONAZOLE SERPL-MCNC: 0.3 UG/ML (ref 0.7–5)

## 2021-02-17 PROCEDURE — 634N000001 HC RX 634: Performed by: CLINICAL NURSE SPECIALIST

## 2021-02-17 PROCEDURE — 250N000013 HC RX MED GY IP 250 OP 250 PS 637: Performed by: STUDENT IN AN ORGANIZED HEALTH CARE EDUCATION/TRAINING PROGRAM

## 2021-02-17 PROCEDURE — 250N000013 HC RX MED GY IP 250 OP 250 PS 637: Performed by: INTERNAL MEDICINE

## 2021-02-17 PROCEDURE — 250N000011 HC RX IP 250 OP 636: Performed by: NURSE PRACTITIONER

## 2021-02-17 PROCEDURE — 99232 SBSQ HOSP IP/OBS MODERATE 35: CPT | Mod: GC | Performed by: INTERNAL MEDICINE

## 2021-02-17 PROCEDURE — 200N000002 HC R&B ICU UMMC

## 2021-02-17 PROCEDURE — 258N000003 HC RX IP 258 OP 636: Performed by: STUDENT IN AN ORGANIZED HEALTH CARE EDUCATION/TRAINING PROGRAM

## 2021-02-17 PROCEDURE — 999N001017 HC STATISTIC GLUCOSE BY METER IP

## 2021-02-17 PROCEDURE — 250N000009 HC RX 250: Performed by: STUDENT IN AN ORGANIZED HEALTH CARE EDUCATION/TRAINING PROGRAM

## 2021-02-17 PROCEDURE — 97116 GAIT TRAINING THERAPY: CPT | Mod: GP

## 2021-02-17 PROCEDURE — 90937 HEMODIALYSIS REPEATED EVAL: CPT

## 2021-02-17 PROCEDURE — 94668 MNPJ CHEST WALL SBSQ: CPT

## 2021-02-17 PROCEDURE — 250N000013 HC RX MED GY IP 250 OP 250 PS 637: Performed by: NURSE PRACTITIONER

## 2021-02-17 PROCEDURE — 97110 THERAPEUTIC EXERCISES: CPT | Mod: GO

## 2021-02-17 PROCEDURE — 97535 SELF CARE MNGMENT TRAINING: CPT | Mod: GO

## 2021-02-17 PROCEDURE — 94640 AIRWAY INHALATION TREATMENT: CPT

## 2021-02-17 PROCEDURE — 97530 THERAPEUTIC ACTIVITIES: CPT | Mod: GO

## 2021-02-17 PROCEDURE — 99233 SBSQ HOSP IP/OBS HIGH 50: CPT | Performed by: INTERNAL MEDICINE

## 2021-02-17 PROCEDURE — 258N000003 HC RX IP 258 OP 636: Performed by: CLINICAL NURSE SPECIALIST

## 2021-02-17 PROCEDURE — 250N000012 HC RX MED GY IP 250 OP 636 PS 637: Performed by: INTERNAL MEDICINE

## 2021-02-17 PROCEDURE — 94640 AIRWAY INHALATION TREATMENT: CPT | Mod: 76

## 2021-02-17 PROCEDURE — 999N000157 HC STATISTIC RCP TIME EA 10 MIN

## 2021-02-17 PROCEDURE — 250N000009 HC RX 250: Performed by: CLINICAL NURSE SPECIALIST

## 2021-02-17 PROCEDURE — 94003 VENT MGMT INPAT SUBQ DAY: CPT

## 2021-02-17 PROCEDURE — 94667 MNPJ CHEST WALL 1ST: CPT

## 2021-02-17 PROCEDURE — 272N000078 HC NUTRITION PRODUCT INTERMEDIATE LITER

## 2021-02-17 PROCEDURE — 99291 CRITICAL CARE FIRST HOUR: CPT | Mod: GC | Performed by: INTERNAL MEDICINE

## 2021-02-17 PROCEDURE — 250N000009 HC RX 250: Performed by: NURSE PRACTITIONER

## 2021-02-17 RX ORDER — POSACONAZOLE 40 MG/ML
300 SUSPENSION ORAL 3 TIMES DAILY
Status: DISCONTINUED | OUTPATIENT
Start: 2021-02-18 | End: 2021-02-17

## 2021-02-17 RX ORDER — DEXTROSE MONOHYDRATE 25 G/50ML
25-50 INJECTION, SOLUTION INTRAVENOUS
Status: DISCONTINUED | OUTPATIENT
Start: 2021-02-17 | End: 2021-02-25 | Stop reason: HOSPADM

## 2021-02-17 RX ORDER — POSACONAZOLE 40 MG/ML
300 SUSPENSION ORAL 3 TIMES DAILY
Status: DISCONTINUED | OUTPATIENT
Start: 2021-02-18 | End: 2021-02-18

## 2021-02-17 RX ORDER — PREDNISONE 2.5 MG/1
2.5 TABLET ORAL EVERY EVENING
Status: DISCONTINUED | OUTPATIENT
Start: 2021-02-18 | End: 2021-02-25 | Stop reason: HOSPADM

## 2021-02-17 RX ORDER — NICOTINE POLACRILEX 4 MG
15-30 LOZENGE BUCCAL
Status: DISCONTINUED | OUTPATIENT
Start: 2021-02-17 | End: 2021-02-25 | Stop reason: HOSPADM

## 2021-02-17 RX ADMIN — LIDOCAINE HYDROCHLORIDE 0.5 ML: 10 INJECTION, SOLUTION EPIDURAL; INFILTRATION; INTRACAUDAL; PERINEURAL at 10:15

## 2021-02-17 RX ADMIN — INSULIN ASPART 2 UNITS: 100 INJECTION, SOLUTION INTRAVENOUS; SUBCUTANEOUS at 15:17

## 2021-02-17 RX ADMIN — INSULIN GLARGINE 10 UNITS: 100 INJECTION, SOLUTION SUBCUTANEOUS at 08:45

## 2021-02-17 RX ADMIN — SODIUM CHLORIDE 250 ML: 9 INJECTION, SOLUTION INTRAVENOUS at 10:18

## 2021-02-17 RX ADMIN — ALBUTEROL SULFATE 2.5 MG: 2.5 SOLUTION RESPIRATORY (INHALATION) at 14:58

## 2021-02-17 RX ADMIN — Medication: at 10:18

## 2021-02-17 RX ADMIN — METOPROLOL TARTRATE 50 MG: 25 TABLET, FILM COATED ORAL at 08:35

## 2021-02-17 RX ADMIN — Medication 1 PACKET: at 08:51

## 2021-02-17 RX ADMIN — ACETAMINOPHEN 325 MG: 325 TABLET, FILM COATED ORAL at 08:44

## 2021-02-17 RX ADMIN — SULFAMETHOXAZOLE AND TRIMETHOPRIM 80 MG: 200; 40 SUSPENSION ORAL at 08:35

## 2021-02-17 RX ADMIN — HEPARIN SODIUM 5000 UNITS: 5000 INJECTION, SOLUTION INTRAVENOUS; SUBCUTANEOUS at 13:16

## 2021-02-17 RX ADMIN — SODIUM CHLORIDE 300 MG: 9 INJECTION, SOLUTION INTRAVENOUS at 08:38

## 2021-02-17 RX ADMIN — BUDESONIDE 0.5 MG: 0.5 INHALANT ORAL at 08:22

## 2021-02-17 RX ADMIN — ALBUTEROL SULFATE 2.5 MG: 2.5 SOLUTION RESPIRATORY (INHALATION) at 02:22

## 2021-02-17 RX ADMIN — INSULIN ASPART 1 UNITS: 100 INJECTION, SOLUTION INTRAVENOUS; SUBCUTANEOUS at 05:09

## 2021-02-17 RX ADMIN — TACROLIMUS 2.5 MG: 5 CAPSULE ORAL at 18:05

## 2021-02-17 RX ADMIN — BUDESONIDE 0.5 MG: 0.5 INHALANT ORAL at 21:48

## 2021-02-17 RX ADMIN — PREDNISONE 20 MG: 20 TABLET ORAL at 08:35

## 2021-02-17 RX ADMIN — ALBUTEROL SULFATE 2.5 MG: 2.5 SOLUTION RESPIRATORY (INHALATION) at 08:22

## 2021-02-17 RX ADMIN — EPOETIN ALFA-EPBX 4000 UNITS: 10000 INJECTION, SOLUTION INTRAVENOUS; SUBCUTANEOUS at 10:18

## 2021-02-17 RX ADMIN — ACETAMINOPHEN 325 MG: 325 TABLET, FILM COATED ORAL at 16:38

## 2021-02-17 RX ADMIN — OLANZAPINE 10 MG: 2.5 TABLET ORAL at 20:30

## 2021-02-17 RX ADMIN — INSULIN ASPART 1 UNITS: 100 INJECTION, SOLUTION INTRAVENOUS; SUBCUTANEOUS at 20:38

## 2021-02-17 RX ADMIN — TACROLIMUS 2 MG: 5 CAPSULE ORAL at 08:35

## 2021-02-17 RX ADMIN — SODIUM CHLORIDE 300 ML: 9 INJECTION, SOLUTION INTRAVENOUS at 10:18

## 2021-02-17 RX ADMIN — HEPARIN SODIUM 5000 UNITS: 5000 INJECTION, SOLUTION INTRAVENOUS; SUBCUTANEOUS at 22:37

## 2021-02-17 RX ADMIN — ACETYLCYSTEINE 2 ML: 200 SOLUTION ORAL; RESPIRATORY (INHALATION) at 21:48

## 2021-02-17 RX ADMIN — Medication 5 ML: at 08:35

## 2021-02-17 RX ADMIN — Medication 40 MG: at 08:36

## 2021-02-17 RX ADMIN — INSULIN ASPART 1 UNITS: 100 INJECTION, SOLUTION INTRAVENOUS; SUBCUTANEOUS at 08:45

## 2021-02-17 RX ADMIN — METOPROLOL TARTRATE 50 MG: 25 TABLET, FILM COATED ORAL at 20:30

## 2021-02-17 RX ADMIN — ACETYLCYSTEINE 2 ML: 200 SOLUTION ORAL; RESPIRATORY (INHALATION) at 08:22

## 2021-02-17 RX ADMIN — ALBUTEROL SULFATE 2.5 MG: 2.5 SOLUTION RESPIRATORY (INHALATION) at 21:48

## 2021-02-17 RX ADMIN — Medication 1 PACKET: at 20:30

## 2021-02-17 RX ADMIN — INSULIN ASPART 2 UNITS: 100 INJECTION, SOLUTION INTRAVENOUS; SUBCUTANEOUS at 11:36

## 2021-02-17 ASSESSMENT — MIFFLIN-ST. JEOR
SCORE: 1126
SCORE: 1096

## 2021-02-17 ASSESSMENT — ACTIVITIES OF DAILY LIVING (ADL)
ADLS_ACUITY_SCORE: 15

## 2021-02-17 NOTE — PROGRESS NOTES
[facility] CRITICAL CARE STAFF NOTE    60 year old female with hx of ESRD, ILD with antsynthetase syndrome s/p BSLT 03/2018 (CMV D+/R+, EBV D/R+) postoperatively complicated by right mainstem bronchial stenosis s/p several dilations, left sided aspergillus empyema (10/2019), and CMV viremia who was admitted on 1/24/2021 from OSH for acute hypoxemic respiratory failure and new lung opacities, requiring vasopressors and intubation.     Overnight/Interim History:     No major events overnight. PEG-J done yesteday. Tolerated trach dome x 2 hours.     Acute Critical Care Issues/Key Findings:     Kecia Blue is critically ill due to acute on chronic hypoxemic respiratory failure.    Acute on chronic hypoxemic respiratory failure: Thought to be secondary to PJP PNA in setting BLST and right mainstem stenosis.  Has now completed 21 days of Bactrim.  Status post trach 2/12.  Tolerating trach dome trials.    Contiunue prophylaxis dose of Bactrim (completed 21 days of treatment)    Balloon dilatation of right mainstem to be done by IP on Friday at 8am    Trach dome for up to 12 hours today, full support at night    Continue saline and Mucomyst nebs for secretion clearance    ICU sedation, history of anxiety: improving and feeling less anxious since trach placement.  Has been off of Precedex drip since tracheostomy placement.    Continue bedtime and as needed Zyprexa    History of bilateral sequential lung transplant (2018): complicated by right mainstem bronchial stenosis requiring balloon dilatation and stent placement in the past.  Appreciate pulmonary transplant team assistance.     Plan for right mainstem balloon dilatation with IP on Friday    Trach dome trials ongoing    Stop valganciclovir folllowing completion of steroids today    Posaconazole level subtherapeutic, discuss doing with pharmacy    Continue tacrolimus dose 2/2.5 mg (increased on 2/15)    Start home dose of prednisone tomorrow      Nutrition: tolerating feeds via NJ.     PEG-J placement yesterday    Tolerating feeds, advance to goal    ESRD:     HD schedule per renal      Disposition: ICU, working on LTACH placement which may be difficult given she would need vent weaning and HD. Best case would be that she tolerates trach dome 24hrs/day and could be discharged to TCU.     Rest per resident note from today.     The patient was seen and examined with the resident/fellow physician.  We have discussed the patient in detail and I agree with the findings, assessment, and plan as documented in their note from today. The plan was formulated in conjunction with pharmacy, ICU nurses, and respiratory therapist. I have personally reviewed today's vital signs, medications, laboratory and imaging results. I have reviewed all consults that have been ordered and are active for this patient.      Critical Care Time: 35 min. I spent this time (excluding procedures) personally providing and directing critical care services at the bedside and on the critical care unit.      Date of Service (when I saw the patient): 02/17/21    Jacinto Jones MD   of Medicine  Division of Pulmonary, Allergy, Critical Care and Sleep Medicine   [facility]  Pager: [phone]

## 2021-02-17 NOTE — PLAN OF CARE
ICU End of Shift Summary. See flowsheets for vital signs and detailed assessment.    Changes this shift: Good sleep overnight, LS coarse at times but scant ETT secretions.  Pressure support 5/5 in evening, switched to 10/5 for low tidal volumes while asleep.    Plan: Continue vent weaning, goal of LTACH/TCU transfer.

## 2021-02-17 NOTE — PROGRESS NOTES
Brief Interventional Pulmonary Note    Patient is scheduled for airway dilation this Friday 2/19/21 at 8AM. Signed consent done with patient and is in chart. Please hold 2/19 AM dose of subcutaneous heparin. DDAVP ordered to be given Friday AM prior to procedure for uremic platelets. Please hold tube feeds at midnight Thursday night.     Juana Baugh  Interventional Pulmonary Fellow  [phone]

## 2021-02-17 NOTE — PROGRESS NOTES
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  Patient was admitted to OSH on  for acute hypoxemic respiratory failure and new lung opacities. Intubated  and transferred to [facility] for ongoing management.  Extubated  but reintubated - for progressive hypoxemia.  Rapid decompensation 2/3 with ARDS presentation requiring reintubation, prone positioning, and inhaled epoprostenol.  Significant rise in leukocytosis (2/3) concerning for worsening PJP versus secondary infection. Now with improved oxygenation and doing PST.       Today's recommendations:  - Finish the prednisone 20 mg today and start her home dose   - Discussed Posaconazole dose with pharmacy given the level 0.3, will switch to enteral Posaconazole and increase the dose to 300 mg TID and check a steady state level   - Agree with right mainstem dilation by IP this week   - Follow on the  Tacrolimus level   - Stop the VGCV after completion of the steroids today       Acute hypoxemic respiratory failure:   Right post-obstructive Stenotrophomonas and Eikenella pneumonia:  PJP pneumonia:  Septic shock:   Airway stenosis with distal plugging:  ARDS: Presented to OSH ED with increased SOB and hypoxia, initially requiring 4L NC but continued to decline and subsequently intubated .  Hemodynamic instability after intubation requiring pressors, transferred to [facility].  Afebrile but with leukocytosis.  COVID and respiratory panel  negative.  PTA bronch (12/23/20) with moderate airway obstruction and RML/RLL mucous plugging, cultures with Stenotrophomonas and Eikenella (IV ceftazidime 1/13-1/19).  OSH CT chest with new multifocal GGO bilaterally and increased left loculated pleural effusion.  Repeat bronch (1/24) with RUL BAL with thick copious secretions to RML/RLL, PJP PCR positive.  Extubated on 1/26, but reintubated from 1/27-1/29 for progressive hypoxemia.  Remained on either HFNC % FiO2 or BiPAP % FiO2, then eventually decompensated on 2/3 and again reintubated.  Working revealing for ARDS, proning protocol initiated (2/3) with inhaled epoprostenol, pressors initiated.  Significant rise in leukocytosis (2/3) concerning for worsening PJP vs secondary infection.  CT chest (2/3) with progressive consolidation (consistent with ARDS) and possible superimposed infection findings, stable LLL chronic empyema.  CRRT initiated 2/4 as below.  CT chest 2/10 with improvement improvement in aeration bilaterally.  Bronch 2/11 with airway stenosis at right anastomosis  - ABX per MICU: Bactrim (1/24-2/15) for PJP and Steno (noted 12/23) coverage, empiric Zosyn (2/3-2/12)  -bactrim for the PJP pneumonia, completed 21 day course, this will cover the Eikenella and Stenothrophomonas he had on BAL from 12/23/2021  - Prednisone 20 mg stop 2/17 and start her home dose   - Chest physiotherapy QID and nebs: Albuterol QID and Mucomyst BID, and Pulmicort BID  - Trach 2/12   - PEG J 2/16  - Ventilator management per MICU     Aspergillus empyema (left-sided): First noted 10/8/19, negative in November and again on admission.  CT scan on 7/17/20 with increased mass-like density, likely pleural-based, in LLL area s/p needle aspiration (8/13/20) with Aspergillus fumigatus on cultures s/p intrapleural amphotericin bead placement (11/20).  Following with ID as OP.  LFTs stable on admission (ALP chronically mildly elevated).  CT chest with increased loculated  left pleural effusion s/p thoracentesis (1/25), exudative with 87% neutrophils, very high LDL (6308), cytology normal.   - AFB pleural cultures (1/25) NGTD--stain negative  - PTA posaconazole, PTA plan for indefinite course.  Changed to IV Posaconazole 2/9/2021 due to subtherapeutic level despite increasing the enteral Posaconazole  - Posaconazole level 2/16 was 0.3, discussed Posaconazole dose with pharmacy, will switch to enteral Posaconazole and increase the dose to 300 mg TID and check a steady state level        S/p bilateral lung transplant for ILD 2/2 CTD:   Right mainstem bronchial stenosis (s/p dilation 3/2019): Last seen in pulmonary clinic 12/2. DSA (1/25) not detected.   -  IP to manage right bronchial stenosis, dilation of the right main stem this week by IP     Immunosuppression: On 2 drug IST d/t recurrent infections  - Tacrolimus  Goal level 8-10, level 7.3 on 2/15, increased the dose to 2/2.5 mg and check a steady state level after 5 doses on 2/18  - Chronic prednisone dose on hold with above dose steroids (5 mg qAM / 2.5 mg qPM), transition to chronic steroid dose 2/18     Prophylaxis:   - Bactrim PPx dose as above     H/o CMV viremia: CMV D+/R+.  CMV <137 (1/25).  - Stop VGCV after stopping the steroids today      EBV viremia: Level 12/9/20 mildly elevated to 10K (log 4) from prior 3K (3.5 log) on 9/9/20, repeat (1/25/21) decreased to 5619 copies (log of 3.8).  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 Chronic iHD M/Th via AVF with partial graft.  Not able to tolerate iHD 2/3 d/t hypotension, line placed and transitioned to CRRT 2/4. Now back to iHD   - Monitoring of tacrolimus as above  - Dialysis management per nephrology     Atrial fibrillation w/ RVR: H/o paroxysmal AF, last 10/2019.  Recurrence 1/30 with RVR (-140s), likely in the setting of acute pulmonary illness.  - Management per MICU  - On Metoprolol 50 mg BID  "     Oropharyngeal candidiasis: White tongue plaque noted 2/1.  - Nystatin QID (2/1)      We appreciate the excellent care provided by the MICU team. Recommendations communicated via in person rounding and this note. Will continue to follow along closely, please do not hesitate to call with any questions or concerns.       Seen and discussed with Dr. Fermin Braun MD   Pulmonary and Critical Care Fellow   Pager [phone]      Subjective & Interval History:     Patient is doing well, she has some pain where she had the GJ tube placed.  No nausea, she is tolerating the trach dome well.  No chest pain.    Review of Systems:     C: No fever, no chills  ENT/MOUTH: trach in place, no nasal congestion or drainage  RESP: See interval history  CV: No chest pain, no peripheral edema  GI: No nausea, no vomiting, no change in stools  : No urine  PSYCHIATRIC: Mood stable    Physical Exam:     Vital signs:  Temp: 98.3  F (36.8  C) Temp src: Oral BP: 109/77 Pulse: 84   Resp: 26 SpO2: 92 % O2 Device: Trach dome Oxygen Delivery: 40 LPM Height: 160 cm (5' 2.99\") Weight: 57.7 kg (127 lb 3.3 oz)  I/O:     Intake/Output Summary (Last 24 hours) at 2/17/2021 1051  Last data filed at 2/17/2021 1000  Gross per 24 hour   Intake 1803 ml   Output 10 ml   Net 1793 ml       Constitutional: In no acute distress   HEENT:  Oral mucosa moist without lesions. Trach in place  PULM: Good air flow bilaterally. Rhonchi, no crackles or wheezing  CV: Normal S1 and S2. No murmur.  ABD: Soft, nontender, nondistended.    MSK: Moves all extremities.   NEURO: Alert and oriented, able to interact while on vent  SKIN: Warm, dry. No rash on limited exam.   PSYCH: Mood stable.     Lines, Drains, and Devices:  Arterial Line 02/04/21 Brachial (Active)   Site Assessment WDL 02/13/21 0800   Line Status Pulsatile blood flow 02/13/21 0800   Arterine Line Cap Change Due 02/16/21 02/12/21 2000   Art Line Waveform Appropriate 02/13/21 0800   Art Line " Interventions Leveled;Connections checked and tightened;Flushed per protocol 02/13/21 0800   Color/Movement/Sensation Capillary refill less than 3 sec 02/13/21 0800   Line Necessity Yes, meets criteria 02/13/21 0800   Dressing Type Transparent 02/13/21 0800   Dressing Status Clean, dry, intact 02/13/21 0800   Dressing Intervention Dressing changed/new dressing 02/07/21 0400   Dressing Change Due 02/14/21 02/13/21 0800   Number of days: 9       CVC Double Lumen 10/25/19 Right Internal jugular (Active)   Number of days: 477       Hemodialysis Vascular Access Arteriovenous fistula Left Arm (Active)   Site Assessment WDL except;Ecchymotic 02/13/21 0800   Cannulation Needle Size 15 02/11/21 1135   Dressing Intervention New dressing 02/11/21 1515   Dressing Status Clean, dry, intact 02/13/21 0800   Verification by x-ray Not applicable 02/09/21 1405   Hand Off Report Yes 02/11/21 1515   Number of days: 19       CVC TRIPLE LUMEN Right Internal jugular (Active)   Site Assessment WDL 02/13/21 0800   Dressing Type Chlorhexidine sponge;Transparent 02/13/21 0800   Dressing Status clean;dry;intact 02/13/21 0800   Dressing Intervention dressing changed 02/07/21 0400   Dressing Change Due 02/14/21 02/13/21 0800   Line Necessity yes, meets criteria 02/13/21 0800   Blue - Status infusing 02/13/21 0800   Blue - Cap Change Due 02/16/21 02/13/21 0800   Brown - Status infusing 02/13/21 0800   Brown - Cap Change Due 02/16/21 02/13/21 0800   White - Status saline locked 02/13/21 0800   White - Cap Change Due 02/08/21 02/08/21 0800   Phlebitis Scale 0-->no symptoms 02/13/21 0800   Infiltration? no 02/13/21 0800   Infiltration Scale 0 02/13/21 0800   Infiltration Site Treatment Method  None 02/11/21 1600   Was a vesicant infusing? no 02/11/21 1600   CVC Comment CDI 02/13/21 0800   Number of days: 20     Data:     LABS    CMP:   Recent Labs   Lab 02/16/21  0612 02/15/21  0418 02/14/21  0336 02/13/21  0323 02/12/21  0315    136 136 134  135   POTASSIUM 3.6 3.9 3.8 4.2 4.1   CHLORIDE 99 100 100 100 100   CO2 28 27 29 26 28   ANIONGAP 7 8 7 8 7   * 155* 149* 142* 175*   BUN 34* 52* 32* 52* 33*   CR 2.27* 2.75* 1.84* 2.59* 1.71*   GFRESTIMATED 23* 18* 30* 20* 32*   GFRESTBLACK 27* 21* 34* 23* 38*   CHELSEY 8.9 8.6 8.6 8.7 8.6   MAG  --  1.9 1.8 2.4* 1.7   PHOS  --  4.5 3.6 5.3* 3.6     CBC:   Recent Labs   Lab 02/16/21  0612 02/15/21  1026 02/15/21  0418 02/14/21  0336 02/13/21  0323   WBC 4.0  --  3.7* 5.3 7.2   RBC 2.87*  --  2.25* 2.46* 2.52*   HGB 8.7* 8.4* 6.7* 7.5* 7.8*   HCT 27.4*  --  21.9* 24.0* 23.9*   MCV 96  --  97 98 95   MCH 30.3  --  29.8 30.5 31.0   MCHC 31.8  --  30.6* 31.3* 32.6   RDW 21.1*  --  21.8* 21.2* 20.1*   *  --  116* 109* 114*       INR:   Recent Labs   Lab 02/11/21  0645   INR 1.07       Glucose:   Recent Labs   Lab 02/17/21  0829 02/17/21  0508 02/17/21  0101 02/16/21  2059 02/16/21  1613 02/16/21  1155 02/16/21  0612 02/16/21  0612 02/15/21  0418 02/15/21  0418 02/14/21  0336 02/14/21  0336 02/13/21  0323 02/13/21  0323 02/12/21  0315 02/12/21  0315 02/11/21  0400 02/11/21  0400   GLC  --   --   --   --   --   --   --  154*  --  155*  --  149*  --  142*  --  175*  --  181*   * 148* 125* 143* 208* 137*   < >  --    < >  --    < >  --    < >  --    < > 173*   < >  --     < > = values in this interval not displayed.       Blood Gas:   Recent Labs   Lab 02/10/21  2000 02/10/21  1555   O2PER 50 50       Culture Data   No results for input(s): CULT in the last 168 hours.    Virology Data:   Lab Results   Component Value Date    FLUAH1 Not Detected 01/24/2021    FLUAH3 Not Detected 01/24/2021    MU3040 Not Detected 01/24/2021    IFLUB Not Detected 01/24/2021    RSVA Not Detected 01/24/2021    RSVB Not Detected 01/24/2021    PIV1 Not Detected 01/24/2021    PIV2 Not Detected 01/24/2021    PIV3 Not Detected 01/24/2021    HMPV Not Detected 01/24/2021    HRVS Negative 01/24/2021    ADVBE Negative 01/24/2021    ADVC  Negative 01/24/2021    ADVC Negative 12/23/2020    ADVC Negative 10/07/2019       Historical CMV results (last 3 of prior testing):  Lab Results   Component Value Date    CMVQNT <137 (A) 01/25/2021    CMVQNT 238 (A) 01/24/2021    CMVQNT 675 (A) 12/23/2020     Lab Results   Component Value Date    CMVLOG <2.1 01/25/2021    CMVLOG 2.4 (H) 01/24/2021    CMVLOG 2.8 (H) 12/23/2020       Urine Studies    Recent Labs   Lab Test 01/24/21  1729 10/21/19  2240   URINEPH 5.0 5.0   NITRITE Negative Negative   LEUKEST Moderate* Large*   WBCU 34* 115*       Most Recent Breeze Pulmonary Function Testing (FVC/FEV1 only)  FVC-Pre   Date Value Ref Range Status   12/09/2020 1.12 L    09/09/2020 1.56 L    03/09/2020 1.57 L    02/19/2020 1.78 L      FVC-%Pred-Pre   Date Value Ref Range Status   12/09/2020 34 %    09/09/2020 47 %    03/09/2020 48 %    02/19/2020 54 %      FEV1-Pre   Date Value Ref Range Status   12/09/2020 0.98 L    09/09/2020 1.10 L    03/09/2020 0.96 L    02/19/2020 0.99 L      FEV1-%Pred-Pre   Date Value Ref Range Status   12/09/2020 37 %    09/09/2020 42 %    03/09/2020 37 %    02/19/2020 38 %        IMAGING    Recent Results (from the past 48 hour(s))   XR Chest Port 1 View    Narrative    EXAM: XR CHEST PORT 1 VW  2/12/2021 11:17 AM     HISTORY:  post trach placement & line placement       COMPARISON:  Chest x-ray and CT 2/9/2021    FINDINGS:   AP portable view of the chest. Tracheostomy tube tip projects in the  high thoracic trachea. Right IJ central venous catheter tip projects  in the superior cavoatrial junction. Enteric tube courses below the  diaphragm with tip outside the field-of-view.. Postoperative changes  with intact median sternotomy wires and mediastinal surgical clips.  Bowel clips projecting over the right axilla. Demonstration of diffuse  patchy pulmonary opacities, most pronounced at the lung bases.      Impression    IMPRESSION:   1. Tracheostomy tube tip projects in the thoracic trachea.  Additional  support devices are stable.  2. No definite change in the diffuse mixed interstitial and airspace  opacities with likely bilateral pleural effusions.    I have personally reviewed the examination and initial interpretation  and I agree with the findings.    ADAM GONZALEZ MD

## 2021-02-17 NOTE — PROGRESS NOTES
HEMODIALYSIS TREATMENT NOTE    Date: 2/17/2021  Time: 1:11 PM    Data:  Pre Wt: 57.7 kg (Estimated)   Desired Wt: 54.7 kg   Post Wt: 54.7 kg (Estimated)  Weight change: 3 kg  Ultrafiltration - Post Run Net Total Removed (mL): 3000 mL  Vascular Access Status: Graft  patent  Dialyzer Rinse: Clear  Total Blood Volume Processed: 79.5 L   Total Dialysis (Treatment) Time: 3.5   Dialysate Bath: K 3, Ca 2.25  Heparin: None    Lab:   No    Interventions:  Pt had a stable uncomplicated 3.5 hours of HD. Writer had trouble cannulating venous site pre-tx, and LOUISE Nguyen came to help out. 3L of fluid was pulled with BV % -9.8. Ending BP was 113/72. Pt rinsed back post tx, needles were pulled, new dressing applied, and sites were held for about 10mins to help achieve hemostasis. Hand off report given to LOUISE Maldonado    Assessment:  -Calm & Cooperative  -VSS  -A & O X 4  -Remain on trach dome   Plan:    Next HD per renal team

## 2021-02-17 NOTE — PLAN OF CARE
ICU End of Shift Summary. See flowsheets for vital signs and detailed assessment.    Changes this shift: Trach dome at 40L/35% since 0830. HD for 3L off. Ambulated in halls w/ PT/OT. Tylenol administered for pain w/ relief.     Plan:  Trach dome during day, CMV at bedtime. OR w/ pulm 2/19 a.m.          Problem: Adult Inpatient Plan of Care  Goal: Plan of Care Review  Outcome: No Change     Problem: Respiratory Compromise (Pneumonia)  Goal: Effective Oxygenation and Ventilation  Outcome: No Change

## 2021-02-17 NOTE — PROGRESS NOTES
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 Found to have PCP pneumonia.    CHANGES and MAJOR THINGS TODAY:   -IR PEG-J tube placement 2/16, currently on TF  - Plan for trach dome during the day 12-14hr, and full support vent overnight for rest    -iHD today  - IP, planning R mainstem dilation this Friday  - Discharge planning to LTACH vs TCU     PLAN:  Neuro:  # Pain and sedation  - Off Precedex post tracheostomy   - prn hydroxyzine for anxiety    # Delirium- resolved   # Encephalopathy, septic- resolved   - Scheduled 10 mg at bedtime and PRN Zyprexa     # Insomnia  - Melatonin available prn    # Anisocoria (L>R)  Intermittently fixed and dilated pupils. CTH unremarkable. Neuro examination unremarkable.  - Opthomalogy evaluated on 2/1    Pulmonary:  # Acute Hypoxic Respiratory Failure  # Tracheostomy (2/12/21)  Suspected 2/2 PJP initially. OSH CT 1/23 showed bilateral ground glass opacities and consolidative lung infiltrates centrally distributed to the upper lobes and RLL. COVID negative x3 at outside hospital. Concern for secondary pneumonia given worsening clinical picture. 2/4 CT chest indicated worsening consolidations consistent with progression of pneumocystis with possibilty of superimposed secondary pneumonia. Previously on inhaled epoprostenol. S/p Perc tracheostomy 2/12 with interventional pulm. Mechanical ventilation AC 18/320/+8/50% - dropped RR to 18 from 24; wean FiO2 to 40% and then PEEP to 5 after  tracheostomy 2/12. Last dose of Zosyn 1/12.   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  - Positive PJP PCR on 1/28 with positive fungitel   - Continue PTA tacrolimus  - Prednisone taper, holding home dose for now  - Pulmonary lung transplant team consulted and following, appreciate recs - may need dilation and/or stent after acute illness has resolved  - IP, planning R mainstem dilation this Friday    Cardiovascular:  # Shock, resolved  # Transient hypotension related to dialysis, sleep- Resolved  Last ECHO 10/21/20 showed EF 60-65%, normal LV and RV function.   - OFF norepinephrine  - D/c midodrine to 10 mg q8 hours    # Afib with RVR, resolved  # ST  Intermittently going in and out of A-fib, currently ST.  - Continue to monitor  - Metoprolol 50mg BID    # Hx HTN  - Hold PTA carvedilol and amlodipine    GI/Nutrition:  # Portal HTN  Liver biopsy positive for congestive hepatopathy. Previous had ascites thought to be r/t elevated right sided heart pressures. Last saw GI on 12/21/20 and patient endorsed that her ascites is no longer present. Etiology unclear.   - NTD    #Nutrition  - IR PEG-J tube placement 2/16, currently on TF  - Daily fiber via PEG-J  tube      # Constipation-resolved and now with loose stools  - Hx of constipation with bactrim, reports frequent BMs  - Change schedule bowel regimen to PRN, due to multiple loose stools     Renal/Fluids/Electrolytes:  # ESRD on iHD twice weekly(M/Th)  # Anion gap metabolic acidosis r/t ESRD, uremia,  resolved  Outpatient dry body weight 56.5 kg. CRRT 2/4 to 2/8. Last HD 2/11 for 2L off   - Nephrology following   - iHD per nephrology   - I&O  - Daily weights   - Trend BMP     # Hypomagnesemia- resolved  - Replace Mag x1    Endocrine:  # Hyperglycemia, steroid induced  - High intensity sliding scale insulin  - 10 units glargine daily    # Seronegative RA with Raynaud's   - NTD, monitor     ID:  # Sepsis in setting of PJP pneumonia, possible secondary pneumonia  Suspected related to PJP pneumonia. OSH CT 1/23 + bilateral ground glass opacities and consolidative lung infiltrates centrally distributed to the upper lobes and RLL. Covid-19 negative X3 at OSH. Procalcitonin negative. Urine strep, CMV, and RPP negative. Given acute and severe decompensation, concern for secondary pneumonia. 2/4 CT chest which displayed worsening consolidations consistent with progression of pneumocystis with the possibility of superimposed secondary pneumonia.  - PJP PCR positive, fungitell positive as well  - Last dose of Bactrim 21 days today,  switch to ppx dose  2/15; prednisone taper  - Zosyn 10-day course  completed on 2/12  - s/p thora 1/25, fluid exudative, negative w/u  - Weekly COVID test per hospital policy, next 2/19    # Chronic/Stable right aspergillus empyema   - Continue posaconazole, treatment dosing, will monitor QTc intermittently (431 on 2/11)  - Posaconazole level ordered for 2/16   -consider transitioning to oral once gj tube placed    Hematology:    # Anemia r/t renal disease, chronic stable   # Thrombocytopenia r/t critical illness  # Leukocytosis, infection/steroids  Takes Aranesp 25 mcg every four weeks, last dose 1/14, next scheduled was 2/11.   Currently receiving epoetin alisha-epox with dialysis   - Discussed with nephrology, will add replacement with HD run on 2/13   -iHD today  - S/p 1PRBC transfusion for hgb 6.7, recheck 8.4, no sign of bleeding. Goal to minimize ICU lab draws as much as possible      Musculoskeletal:  # Generalized weakness  - PT/OT  - Increase activity, up to chair, PT following    Skin:  # Dermatomyositis antitryptase syndrome   - Noted, NTD    General Cares/Prophylaxis:    DVT Prophylaxis: Heparin subcutaneous (will hold for procedure)   GI Prophylaxis: PPI  Restraints: none  Family Communication:  updated at the bedside  Code Status: full    Lines/tubes/drains:  - trach  - HD fistula left forearm  - CVC Left IJ  - Art line - to be removed 2/14  - NJT    Disposition:  - Medical ICU     Patient seen and findings/plan discussed with medical ICU staff, Dr. Robert Quinones MD  Internal Medicine, PGY-1  [facility]   Pager: [phone]    ====================================  INTERVAL HISTORY:   No acute events overnight. Denies nausea this morning, reminded to use PRN anti-nausea to avoid emesis. Mild PEG tube discomfort but denies  pain or any other concerns.   at the bedside.     OBJECTIVE:   1. VITAL SIGNS:   Temp:  [97  F (36.1  C)-99.8  F (37.7  C)] 98.3  F (36.8  C)  Pulse:  [] 89  Resp:  [14-33] 18  BP: (103-129)/(61-82) 121/76  FiO2 (%):  [30 %-35 %] 35 %  SpO2:  [91 %-100 %] 96 %  Ventilation Mode: Trach collar  FiO2 (%): 35 %  Rate Set (breaths/minute): 18 breaths/min  Tidal Volume Set (mL): 320 mL  PEEP (cm H2O): 5 cmH2O  Pressure Support (cm H2O): 10 cmH2O  Oxygen Concentration (%): 30 %  Resp: 18    2. INTAKE/ OUTPUT:   I/O last 3 completed shifts:  In: 1703 [I.V.:758; NG/GT:360]  Out: 10 [Emesis/NG output:10] Urine Mixed with stool, unable to monitor accurately per nursing staff    3. PHYSICAL EXAMINATION:  General: Laying in bed, intubated via trachostomy and alert  Neuro: eyes open, following commands, alert and oriented x4  Pulm/Resp: Coarse breath sounds bilaterally, breathing non-labored  CV: ST. Pulses intact. No edema.   Abdomen: Soft, non-distended, non-tender, bowel sounds active.   Incisions/Skin: Left AV fistula with  thrill and bruit    4. LABS:   Arterial Blood Gases   Recent Labs   Lab 02/10/21  2000 02/10/21  1555   PH 7.41 7.36   PCO2 42 46*   PO2 90 105   HCO3 26 26     Complete Blood Count   Recent Labs   Lab 02/16/21  0612 02/15/21  1026 02/15/21  0418 02/14/21  0336 02/13/21  0323   WBC 4.0  --  3.7* 5.3 7.2   HGB 8.7* 8.4* 6.7* 7.5* 7.8*   *  --  116* 109* 114*     Basic Metabolic Panel  Recent Labs   Lab 02/16/21  0612 02/15/21  0418 02/14/21  0336 02/13/21  0323    136 136 134   POTASSIUM 3.6 3.9 3.8 4.2   CHLORIDE 99 100 100 100   CO2 28 27 29 26   BUN 34* 52* 32* 52*   CR 2.27* 2.75* 1.84* 2.59*   * 155* 149* 142*     Liver Function Tests  Recent Labs   Lab 02/11/21  0645   INR 1.07     Coagulation Profile  Recent Labs   Lab 02/12/21  0315 02/11/21  0645   INR  --  1.07   PTT 28  --        5. RADIOLOGY:   Recent Results (from the past 24 hour(s))   IR Gastro Jejunostomy Tube Placement    Narrative    PROCEDURES 264 and 21:  1. Gastrojejunostomy tube placement under fluoroscopic guidance.    Clinical History: 60-year-old female with ILD status post lung  transplant requiring feeding tube for nutrition.    Comparisons: CT abdomen pelvis 2/9/2021.    Staff Radiologist: Shree Ayers MD    Fellow(s): Meet Rich M.D.    Medications: The patient was placed on continuous monitoring.  Intravenous sedation was administered. Patient received 50 mg of  fentanyl and 1 mg of Versed. Vital signs and sedation monitored by  nursing staff under Interventional Radiologist's supervision. The  patient remained stable throughout the procedure.    Sedation time: 25 minutes face-to-face    Fluoroscopy time: 12.1 minutes.    Dose: 72.3 mGy    PROCEDURE: The patient understood the limitations, alternatives, and  risks of the procedure and requested the procedure be performed. Both  written and oral consent were obtained.    Pre-procedural ultrasound performed to delineate the liver margins.  Indwelling  nasojejunal tube was pulled back into the stomach under  fluoroscopic guidance.    The left upper quadrant was prepped and draped in the usual sterile  fashion. 1% lidocaine without epinephrine was used for local  anesthesia.     Glucagon administered. Stomach inflated with air through the  nasogastric tube. Under fluoroscopic guidance, gastropexy was made  with 2 [employer] Saf-T-Pexy* T-fasteners.    Needle gastrostomy made under fluoroscopic guidance with the 18 gauge  Safety Introducer needle. Needle removed over guidewire. Proximal  jejunum catheterized with the 5 [place]ian RAFAEL 1 catheter over guidewire  under fluoroscopic guidance. The guidewire was exchanged for a stiff  angled Glidewire. Catheter exchanged over Glidewire for the 22 [place]ian  introducer dilator/peel-away sheath. Dilator exchanged over guidewire  for the 18 [place]ian 45 cm [employer] JAYME Transgastric-Jejunal  feeding tube through the peel-away sheath. Guidewire and peel-away  sheath removed. Tube balloon inflated and tube secured. Adequate  placement of gastric and jejunal ports documented with contrast.     Fluoroscopic image documenting tube placement was saved in the  patient's record.     Ports flushed with saline. Sterile dressing applied. Gastric port  placed to gravity drainage. Nasogastric tube removed. No immediate  complication.       Impression    IMPRESSION:   1. 18 Lithuanian 45 cm [employer] JAYME Transgastric-Jejunal  gastrojejunostomy tube placed under fluoroscopic guidance. Gastric  port to gravity drainage.    PLAN:  Bedrest for 2 hours.    Procedure performed by Dr. Rich under my supervision.  I, Dr. Yamini Ayers, was present for the entire procedure.    I have personally reviewed the examination and initial interpretation  and I agree with the findings.    YAMINI AYERS MD

## 2021-02-17 NOTE — PROGRESS NOTES
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 Nephrology consutled for dialysis needs.          Assessment and  Recommendation     Assessment:  Mrs Blue is a 60 yof with ESRD and hx of lung tx admitted with PNA. Had been on 2x/week HD prior to admission, now admitted on 1/24/2021 for management of resp failure.  Nephrology consulted for management of dialysis.        Melissa Estevez on 2/17/2021 at 12:11 PM   I have communicated the plan with the primary team via verbal communication.     Seen and discussed with ADRIÁN Dunn CNS  Clinical Nurse Specialist  [phone]          Medical Student Interval History MARIA A Interval History   Medical student: Interval History  Mrs. Blue will have another run of HD today with a goal to pull 3L of UF. Had PEG placed yesterday, TF running with goal of 45ml/hr. No significant urine output.   ESRD- secondary to tacro use, has been on HD close to a year, was running Monday Thursday, will likely need to run 3 times/week moving forward.   Volume- has mild edema, wt is up from yesterday but close to admission weight, will plan to pull 3L today with HD run.   Labs were not checked today, will look again tomorrow.   Anemia- Hgb yesterday was 8.7.  Working toward transitioning to TCU or LTACH.   Review of Systems  Gen: No fevers or chills  CV: No CP at rest  Resp: No SOB at rest  GI: No N/V Mrs Blue was seen on HD today, pulling 3L as she still has some abdominal edema, did run 3.5h to  "achieve, once closer to euvolemic we may be able to go down to 3h runs depending on intake/UF needs.  Next run planned for 2/19, on Epo 4k with runs, last Hgb 8.7, may increase dose as course progresses.      ESRD-Runs at [place] through [facility].  Has atypical HD schedule of Monday and Thursday, has AVF with partial graft which has been challenging to access per RN's.  Needed CRRT 2/5-2/8 due to hemodynamic instability but otherwise has run iHD.  Working on getting to stable 3x/week schedule, has trach and PEG, working on tx to TCU.                 -HD today, planning every other day.                  -Has L arm AVF/graft, somewhat challenging access.                -Removed temp HD line on 2/9.        Volume status-Has some abdominal edema and wt is still a bit up, pulling 3L with run today, may be able to back off once euvolemic.      Electrolytes/pH-Not checked today.      Ca/phos/pth-Not checked today     Anemia-Hgb 8.6 yesterday, has needed intermittent PRBC's.  Checked iron stores and sats 36% on 2/3, started EPO 4k with runs.       Nutrition-Nutren TF.      Review of Systems:   Gen: No fevers or chills  CV: No CP at rest  Resp: No SOB at rest  GI: No N/V        Physical Exam (Student)  \"Vitals were reviewed\"  /76   Pulse 89   Temp 98.3  F (36.8  C)   Resp 18   Ht 1.6 m (5' 2.99\")   Wt 57.7 kg (127 lb 3.3 oz)   LMP 06/07/2014 (Exact Date)   SpO2 96%   BMI 22.54 kg/m        Intake/Output Summary (Last 24 hours) at 2/17/2021 1206  Last data filed at 2/17/2021 1000  Gross per 24 hour   Intake 1653 ml   Output 10 ml   Net 1643 ml      GENERAL APPEARANCE: Vent via trached.   EYES: no scleral icterus  Endo: no moon facies  Pulmonary: equal expansion.    CV: regular rhythm, normal rate - Edema present  GI: soft, non distended  MS: no evidence of inflammation in joints, no muscle tenderness  :  no rivas, making very little urine, if any   SKIN: warm, dry, +1 edema.    NEURO: No " "focal deficits.       Physical Exam (Physician)  Vitals were reviewed  /76   Pulse 89   Temp 98.3  F (36.8  C)   Resp 18   Ht 1.6 m (5' 2.99\")   Wt 57.7 kg (127 lb 3.3 oz)   LMP 06/07/2014 (Exact Date)   SpO2 96%   BMI 22.54 kg/m       Intake/Output Summary (Last 24 hours) at 2/17/2021 1206  Last data filed at 2/17/2021 1000  Gross per 24 hour   Intake 1653 ml   Output 10 ml   Net 1643 ml        GENERAL APPEARANCE: Vent via trach.   EYES: no scleral icterus  Endo: no moon facies  Pulmonary: equal expansion.    CV: regular rhythm, normal rate -   GI: soft, non distended, Some abd Edema present  MS: no evidence of inflammation in joints, no muscle tenderness  :  no rivas, making very little urine  SKIN: warm, dry, no peripheral edema.    NEURO: No focal deficits.      Labs:   The following labs were reviewed:   CMP  Recent Labs   Lab 02/16/21  0612 02/15/21  0418 02/14/21  0336 02/13/21  0323 02/12/21  0315    136 136 134 135   POTASSIUM 3.6 3.9 3.8 4.2 4.1   CHLORIDE 99 100 100 100 100   CO2 28 27 29 26 28   ANIONGAP 7 8 7 8 7   * 155* 149* 142* 175*   BUN 34* 52* 32* 52* 33*   CR 2.27* 2.75* 1.84* 2.59* 1.71*   GFRESTIMATED 23* 18* 30* 20* 32*   GFRESTBLACK 27* 21* 34* 23* 38*   CHELSEY 8.9 8.6 8.6 8.7 8.6   MAG  --  1.9 1.8 2.4* 1.7   PHOS  --  4.5 3.6 5.3* 3.6     CBC  Recent Labs   Lab 02/16/21  0612 02/15/21  1026 02/15/21  0418 02/14/21  0336 02/13/21  0323   HGB 8.7* 8.4* 6.7* 7.5* 7.8*   WBC 4.0  --  3.7* 5.3 7.2   RBC 2.87*  --  2.25* 2.46* 2.52*   HCT 27.4*  --  21.9* 24.0* 23.9*   MCV 96  --  97 98 95   MCH 30.3  --  29.8 30.5 31.0   MCHC 31.8  --  30.6* 31.3* 32.6   RDW 21.1*  --  21.8* 21.2* 20.1*   *  --  116* 109* 114*     INR  Recent Labs   Lab 02/12/21  0315 02/11/21  0645   INR  --  1.07   PTT 28  --      ABG  Recent Labs   Lab 02/10/21  2000 02/10/21  1555   PH 7.41 7.36   PCO2 42 46*   PO2 90 105   HCO3 26 26   O2PER 50 50      URINE STUDIES  Recent Labs   Lab Test " 01/24/21  1729 10/21/19  2240 09/12/19  0125 08/07/19  1512   COLOR Yellow Yellow Light Yellow Yellow   APPEARANCE Slightly Cloudy Cloudy Clear Clear   URINEGLC Negative Negative Negative Negative   URINEBILI Negative Negative Negative Negative   URINEKETONE 5* Negative 10* Negative   SG 1.023 1.018 1.008 1.016   UBLD Small* Trace* Negative Negative   URINEPH 5.0 5.0 7.5* 7.0   PROTEIN 30* 30* 30* 100*   NITRITE Negative Negative Negative Negative   LEUKEST Moderate* Large* Negative Trace*   RBCU 26* 25* <1 10*   WBCU 34* 115* 3 19*     Recent Labs   Lab Test 08/07/19  1512   UTPG 2.47*     PTH  No lab results found.  IRON STUDIES  Recent Labs   Lab Test 02/03/21  0415 12/13/18  1033 08/01/18  0921 05/08/18  0709   IRON 51 16* 93 48   * 221* 248 275   IRONSAT 36 7* 37 18   YOLA  --  302* 571*  --      Imaging:      Current Medications:    acetylcysteine  2 mL Nebulization BID     albuterol  2.5 mg Nebulization Q6H     B and C vitamin Complex with folic acid  5 mL Oral or Feeding Tube Daily     budesonide  0.5 mg Nebulization BID     fiber modular (NUTRISOURCE FIBER)  1 packet Per Feeding Tube Daily     gelatin absorbable  1 each Topical During Hemodialysis (from stock)     heparin ANTICOAGULANT  5,000 Units Subcutaneous Q8H     insulin aspart  1-12 Units Subcutaneous Q4H     insulin glargine  10 Units Subcutaneous QAM AC     metoprolol tartrate  50 mg Oral or Feeding Tube BID     OLANZapine  10 mg Oral or Feeding Tube Q24H     pantoprazole  40 mg Oral QAM AC     [START ON 2/18/2021] posaconazole  300 mg Oral TID     [START ON 2/18/2021] predniSONE  2.5 mg Oral or Feeding Tube QPM     predniSONE  5 mg Oral or Feeding Tube QAM     protein modular  1 packet Per Feeding Tube BID     sodium chloride  3 mL Nebulization BID     sulfamethoxazole-trimethoprim  10 mL Oral or Feeding Tube Once per day on Mon Wed Fri     tacrolimus  2 mg Oral or Feeding Tube Q24H     tacrolimus  2.5 mg Oral or Feeding Tube Q24H      valGANciclovir  450 mg Oral Once per day on Mon Thu       dextrose Stopped (02/16/21 8568)     sodium chloride 10 mL/hr at 02/08/21 0400     - MEDICATION INSTRUCTIONS -       Melissa Estevez

## 2021-02-18 ENCOUNTER — APPOINTMENT (OUTPATIENT)
Dept: OCCUPATIONAL THERAPY | Facility: CLINIC | Age: 61
End: 2021-02-18
Attending: INTERNAL MEDICINE
Payer: COMMERCIAL

## 2021-02-18 ENCOUNTER — ANESTHESIA EVENT (OUTPATIENT)
Dept: SURGERY | Facility: CLINIC | Age: 61
End: 2021-02-18
Payer: COMMERCIAL

## 2021-02-18 ENCOUNTER — APPOINTMENT (OUTPATIENT)
Dept: PHYSICAL THERAPY | Facility: CLINIC | Age: 61
End: 2021-02-18
Attending: INTERNAL MEDICINE
Payer: COMMERCIAL

## 2021-02-18 ENCOUNTER — APPOINTMENT (OUTPATIENT)
Dept: GENERAL RADIOLOGY | Facility: CLINIC | Age: 61
End: 2021-02-18
Attending: INTERNAL MEDICINE
Payer: COMMERCIAL

## 2021-02-18 LAB
ANION GAP SERPL CALCULATED.3IONS-SCNC: 8 MMOL/L (ref 3–14)
BUN SERPL-MCNC: 29 MG/DL (ref 7–30)
CALCIUM SERPL-MCNC: 8.4 MG/DL (ref 8.5–10.1)
CHLORIDE SERPL-SCNC: 100 MMOL/L (ref 94–109)
CO2 SERPL-SCNC: 27 MMOL/L (ref 20–32)
CREAT SERPL-MCNC: 2.19 MG/DL (ref 0.52–1.04)
ERYTHROCYTE [DISTWIDTH] IN BLOOD BY AUTOMATED COUNT: 21.3 % (ref 10–15)
GFR SERPL CREATININE-BSD FRML MDRD: 24 ML/MIN/{1.73_M2}
GLUCOSE BLDC GLUCOMTR-MCNC: 113 MG/DL (ref 70–99)
GLUCOSE BLDC GLUCOMTR-MCNC: 129 MG/DL (ref 70–99)
GLUCOSE BLDC GLUCOMTR-MCNC: 130 MG/DL (ref 70–99)
GLUCOSE BLDC GLUCOMTR-MCNC: 145 MG/DL (ref 70–99)
GLUCOSE BLDC GLUCOMTR-MCNC: 158 MG/DL (ref 70–99)
GLUCOSE BLDC GLUCOMTR-MCNC: 98 MG/DL (ref 70–99)
GLUCOSE SERPL-MCNC: 158 MG/DL (ref 70–99)
HCT VFR BLD AUTO: 28.3 % (ref 35–47)
HGB BLD-MCNC: 8.8 G/DL (ref 11.7–15.7)
MCH RBC QN AUTO: 30.7 PG (ref 26.5–33)
MCHC RBC AUTO-ENTMCNC: 31.1 G/DL (ref 31.5–36.5)
MCV RBC AUTO: 99 FL (ref 78–100)
PLATELET # BLD AUTO: 209 10E9/L (ref 150–450)
POTASSIUM SERPL-SCNC: 3.6 MMOL/L (ref 3.4–5.3)
RBC # BLD AUTO: 2.87 10E12/L (ref 3.8–5.2)
SODIUM SERPL-SCNC: 134 MMOL/L (ref 133–144)
TACROLIMUS BLD-MCNC: 8.8 UG/L (ref 5–15)
TME LAST DOSE: NORMAL H
WBC # BLD AUTO: 3.2 10E9/L (ref 4–11)

## 2021-02-18 PROCEDURE — 250N000009 HC RX 250: Performed by: STUDENT IN AN ORGANIZED HEALTH CARE EDUCATION/TRAINING PROGRAM

## 2021-02-18 PROCEDURE — 99233 SBSQ HOSP IP/OBS HIGH 50: CPT | Performed by: INTERNAL MEDICINE

## 2021-02-18 PROCEDURE — 250N000013 HC RX MED GY IP 250 OP 250 PS 637: Performed by: INTERNAL MEDICINE

## 2021-02-18 PROCEDURE — 94003 VENT MGMT INPAT SUBQ DAY: CPT

## 2021-02-18 PROCEDURE — 97530 THERAPEUTIC ACTIVITIES: CPT | Mod: GO

## 2021-02-18 PROCEDURE — 999N001017 HC STATISTIC GLUCOSE BY METER IP

## 2021-02-18 PROCEDURE — 250N000013 HC RX MED GY IP 250 OP 250 PS 637: Performed by: STUDENT IN AN ORGANIZED HEALTH CARE EDUCATION/TRAINING PROGRAM

## 2021-02-18 PROCEDURE — 250N000012 HC RX MED GY IP 250 OP 636 PS 637: Performed by: INTERNAL MEDICINE

## 2021-02-18 PROCEDURE — 94640 AIRWAY INHALATION TREATMENT: CPT | Mod: 76

## 2021-02-18 PROCEDURE — 97116 GAIT TRAINING THERAPY: CPT | Mod: GP

## 2021-02-18 PROCEDURE — 99233 SBSQ HOSP IP/OBS HIGH 50: CPT | Performed by: NURSE PRACTITIONER

## 2021-02-18 PROCEDURE — 94668 MNPJ CHEST WALL SBSQ: CPT

## 2021-02-18 PROCEDURE — 999N000157 HC STATISTIC RCP TIME EA 10 MIN

## 2021-02-18 PROCEDURE — 250N000011 HC RX IP 250 OP 636: Performed by: NURSE PRACTITIONER

## 2021-02-18 PROCEDURE — 200N000002 HC R&B ICU UMMC

## 2021-02-18 PROCEDURE — 94640 AIRWAY INHALATION TREATMENT: CPT

## 2021-02-18 PROCEDURE — 71045 X-RAY EXAM CHEST 1 VIEW: CPT

## 2021-02-18 PROCEDURE — 250N000013 HC RX MED GY IP 250 OP 250 PS 637: Performed by: NURSE PRACTITIONER

## 2021-02-18 PROCEDURE — 71045 X-RAY EXAM CHEST 1 VIEW: CPT | Mod: 26 | Performed by: RADIOLOGY

## 2021-02-18 PROCEDURE — 97110 THERAPEUTIC EXERCISES: CPT | Mod: GO

## 2021-02-18 PROCEDURE — 80048 BASIC METABOLIC PNL TOTAL CA: CPT | Performed by: STUDENT IN AN ORGANIZED HEALTH CARE EDUCATION/TRAINING PROGRAM

## 2021-02-18 PROCEDURE — 97530 THERAPEUTIC ACTIVITIES: CPT | Mod: GP

## 2021-02-18 PROCEDURE — 272N000078 HC NUTRITION PRODUCT INTERMEDIATE LITER

## 2021-02-18 PROCEDURE — 36415 COLL VENOUS BLD VENIPUNCTURE: CPT | Performed by: STUDENT IN AN ORGANIZED HEALTH CARE EDUCATION/TRAINING PROGRAM

## 2021-02-18 PROCEDURE — 80197 ASSAY OF TACROLIMUS: CPT | Performed by: STUDENT IN AN ORGANIZED HEALTH CARE EDUCATION/TRAINING PROGRAM

## 2021-02-18 PROCEDURE — 99291 CRITICAL CARE FIRST HOUR: CPT | Mod: GC | Performed by: INTERNAL MEDICINE

## 2021-02-18 PROCEDURE — 250N000009 HC RX 250: Performed by: NURSE PRACTITIONER

## 2021-02-18 PROCEDURE — 85027 COMPLETE CBC AUTOMATED: CPT | Performed by: STUDENT IN AN ORGANIZED HEALTH CARE EDUCATION/TRAINING PROGRAM

## 2021-02-18 RX ORDER — POSACONAZOLE 40 MG/ML
300 SUSPENSION ORAL 3 TIMES DAILY
Status: DISCONTINUED | OUTPATIENT
Start: 2021-02-18 | End: 2021-02-25 | Stop reason: HOSPADM

## 2021-02-18 RX ADMIN — INSULIN GLARGINE 10 UNITS: 100 INJECTION, SOLUTION SUBCUTANEOUS at 09:06

## 2021-02-18 RX ADMIN — HEPARIN SODIUM 5000 UNITS: 5000 INJECTION, SOLUTION INTRAVENOUS; SUBCUTANEOUS at 21:40

## 2021-02-18 RX ADMIN — POSACONAZOLE 300 MG: 40 SUSPENSION ORAL at 15:30

## 2021-02-18 RX ADMIN — INSULIN ASPART 1 UNITS: 100 INJECTION, SOLUTION INTRAVENOUS; SUBCUTANEOUS at 04:42

## 2021-02-18 RX ADMIN — ALBUTEROL SULFATE 2.5 MG: 2.5 SOLUTION RESPIRATORY (INHALATION) at 15:50

## 2021-02-18 RX ADMIN — ACETAMINOPHEN 325 MG: 325 TABLET, FILM COATED ORAL at 06:58

## 2021-02-18 RX ADMIN — BUDESONIDE 0.5 MG: 0.5 INHALANT ORAL at 07:57

## 2021-02-18 RX ADMIN — ALBUTEROL SULFATE 2.5 MG: 2.5 SOLUTION RESPIRATORY (INHALATION) at 07:57

## 2021-02-18 RX ADMIN — ALBUTEROL SULFATE 2.5 MG: 2.5 SOLUTION RESPIRATORY (INHALATION) at 20:30

## 2021-02-18 RX ADMIN — PREDNISONE 5 MG: 5 TABLET ORAL at 08:56

## 2021-02-18 RX ADMIN — Medication 5 ML: at 08:57

## 2021-02-18 RX ADMIN — HEPARIN SODIUM 5000 UNITS: 5000 INJECTION, SOLUTION INTRAVENOUS; SUBCUTANEOUS at 05:48

## 2021-02-18 RX ADMIN — METOPROLOL TARTRATE 50 MG: 25 TABLET, FILM COATED ORAL at 08:56

## 2021-02-18 RX ADMIN — Medication 1 PACKET: at 21:00

## 2021-02-18 RX ADMIN — ACETYLCYSTEINE 2 ML: 200 SOLUTION ORAL; RESPIRATORY (INHALATION) at 20:30

## 2021-02-18 RX ADMIN — TACROLIMUS 2 MG: 5 CAPSULE ORAL at 08:57

## 2021-02-18 RX ADMIN — ACETAMINOPHEN 325 MG: 325 TABLET, FILM COATED ORAL at 21:25

## 2021-02-18 RX ADMIN — PREDNISONE 2.5 MG: 2.5 TABLET ORAL at 21:00

## 2021-02-18 RX ADMIN — BUDESONIDE 0.5 MG: 0.5 INHALANT ORAL at 20:30

## 2021-02-18 RX ADMIN — INSULIN ASPART 1 UNITS: 100 INJECTION, SOLUTION INTRAVENOUS; SUBCUTANEOUS at 09:06

## 2021-02-18 RX ADMIN — VALGANCICLOVIR HYDROCHLORIDE 450 MG: 50 POWDER, FOR SOLUTION ORAL at 21:00

## 2021-02-18 RX ADMIN — POSACONAZOLE 300 MG: 40 SUSPENSION ORAL at 08:59

## 2021-02-18 RX ADMIN — Medication 1 PACKET: at 08:58

## 2021-02-18 RX ADMIN — Medication 40 MG: at 08:57

## 2021-02-18 RX ADMIN — POSACONAZOLE 300 MG: 40 SUSPENSION ORAL at 21:00

## 2021-02-18 RX ADMIN — ALBUTEROL SULFATE 2.5 MG: 2.5 SOLUTION RESPIRATORY (INHALATION) at 01:11

## 2021-02-18 RX ADMIN — TACROLIMUS 2.5 MG: 5 CAPSULE ORAL at 18:12

## 2021-02-18 RX ADMIN — OLANZAPINE 10 MG: 2.5 TABLET ORAL at 21:00

## 2021-02-18 RX ADMIN — ACETAMINOPHEN 325 MG: 325 TABLET, FILM COATED ORAL at 14:01

## 2021-02-18 RX ADMIN — HEPARIN SODIUM 5000 UNITS: 5000 INJECTION, SOLUTION INTRAVENOUS; SUBCUTANEOUS at 15:31

## 2021-02-18 RX ADMIN — ACETYLCYSTEINE 2 ML: 200 SOLUTION ORAL; RESPIRATORY (INHALATION) at 07:57

## 2021-02-18 RX ADMIN — METOPROLOL TARTRATE 50 MG: 25 TABLET, FILM COATED ORAL at 21:00

## 2021-02-18 ASSESSMENT — ACTIVITIES OF DAILY LIVING (ADL)
ADLS_ACUITY_SCORE: 15

## 2021-02-18 NOTE — PROGRESS NOTES
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     Overnight/Interim History:     No major events overnight. Tolerated trach dome for most of the day yesterday.    Acute Critical Care Issues/Key Findings:     Kecia Blue is critically ill due to acute on chronic hypoxemic respiratory failure.    Acute on chronic hypoxemic respiratory failure: Thought to be secondary to PJP PNA in setting BLST and right mainstem stenosis.  Has now completed 21 days of Bactrim.  Status post trach 2/12.  Tolerating trach dome trials.    Contiunue prophylaxis dose of Bactrim (completed 21 days of treatment)    Balloon dilatation of right mainstem to be done by IP on Friday at 8am    Trach dome for up to 12 hours today, full support at night    Following dilation will begin to extend trach dome trials through the night    Continue saline and Mucomyst nebs for secretion clearance    ICU sedation, history of anxiety: improving and feeling less anxious since trach placement.  Has been off of Precedex drip since tracheostomy placement.    Continue bedtime and as needed Zyprexa    History of bilateral sequential lung transplant (2018): complicated by right mainstem bronchial stenosis requiring balloon dilatation and stent placement in the past.  Appreciate pulmonary transplant team assistance.     Plan for right mainstem balloon dilatation with IP on Friday    Trach dome trials ongoing    Stop valganciclovir per pulm recs    Posaconazole level subtherapeutic, increase to 300 mg TID    Continue tacrolimus dose 2/2.5 mg (increased on  2/15)    Start home dose of prednisone today    Nutrition: tolerating feeds via PEG-J (placed 2/16).     Tolerating feeds, at goal    ESRD:     HD schedule per renal      Disposition: ICU, working on LTACH placement which may be difficult given she would need vent weaning and HD. Best case would be that she tolerates trach dome 24hrs/day and could be discharged to TCU.     Rest per resident note from today.     The patient was seen and examined with the resident/fellow physician.  We have discussed the patient in detail and I agree with the findings, assessment, and plan as documented in their note from today. The plan was formulated in conjunction with pharmacy, ICU nurses, and respiratory therapist. I have personally reviewed today's vital signs, medications, laboratory and imaging results. I have reviewed all consults that have been ordered and are active for this patient.      Critical Care Time: 35 min. I spent this time (excluding procedures) personally providing and directing critical care services at the bedside and on the critical care unit.      Date of Service (when I saw the patient): 02/18/21    Jacinto Jones MD   of Medicine  Division of Pulmonary, Allergy, Critical Care and Sleep Medicine   [facility]  Pager: [phone]

## 2021-02-18 NOTE — PROGRESS NOTES
CLINICAL NUTRITION SERVICES - REASSESSMENT NOTE     Nutrition Prescription    RECOMMENDATIONS FOR MDs/PROVIDERS TO ORDER:  None today.    Malnutrition Status:    Severe malnutrition in the context of severe illness.     Recommendations already ordered by Registered Dietitian (RD):  Given continuous weight decline, increasing to Nutren 1.5 @ 50 mL/hr to provide 1800 kcals (33 kcal/kg/day), 82 g PRO (1.5 g/kg/day), 912 mL H2O, 211 g CHO and no fiber daily.  Continue with 2 pkt Prosource daily to provide 1880 kcal (34 kcal/kg) and 104 g PRO (1.9 g/kg) daily.    Future/Additional Recommendations:  Increase fiber provisions PRN.     EVALUATION OF THE PROGRESS TOWARD GOALS   Diet: NPO  Nutrition Support: Nutren 1.5 @ 45 mL/hr via J-tube which provides 1620 kcals (27 kcal/kg), 73 g PRO (1.2 g/kg), 821 mL H2O, 190 g CHO and no Fiber daily. With 2 pkt Prosource daily, total provisions = 1700 kcal (29 kcal/kg) and 95 g PRO (1.6 g PRO/kg). Nutrisource Fiber was started on 2/14, 1 pkt/day. This provides 3 g soluble fiber.  Intake: pt received an avg of 81% of her goal TF regimen over the past 7 days. With prosource intake, pt received an avg of 25 kcal/kg and 1.5 g PRO/kg daily.     NEW FINDINGS   Weight is down another 4.4% over the past 7 days, changed dosing wt to 55 kg. This is 4 kg less than adm wt.    Updated estimated needs for wt loss:  Estimated Energy Needs: 7037-3240 kcals/day (30-35 kcals/kg)   Justification: Maintenance   Estimated Protein Needs: + g/day (1.5-2+ g/kg)   Justification: Dialysis     Pt had a Conner key GJ tube placed on 2/16. TF held around trach and GJ tube placements, leading to underfeeding over the past week.    MALNUTRITION  % Intake: Decreased intake does not meet criteria  % Weight Loss: > 2% in 1 week  Subcutaneous Fat Loss: Facial region: moderate  Muscle Loss: Temporal: moderate, Facial & jaw region: moderate, Scapular bone: severe Thoracic region (clavicle, acromium bone, deltoid,  trapezius, pectoral): moderate, Upper arm (bicep, tricep): severe, Lower arm (forearm): mild; Upper leg: severe and Posterior calf: severe  Fluid Accumulation/Edema: Moderate- trace generalized edema; 3+ bilateral pedal edema  Malnutrition Diagnosis: Severe malnutrition in the context of severe illness.     Previous Goals   Total avg nutritional intake to meet a minimum of 25 kcal/kg and 1.2 g PRO/kg daily (per dosing wt 59 kg).  Evaluation: Met    Previous Nutrition Diagnosis  Predicted inadequate nutrient intake (calories, protein)  Evaluation: No change    CURRENT NUTRITION DIAGNOSIS  Predicted inadequate nutrient intake (calories) related to prolonged hospital LOS with risk for frequent interruptions to TF infusion.      INTERVENTIONS  Implementation  Collaboration with other providers- MICU team rounds  Enteral nutrition- modify rate    Goals  Total avg nutritional intake to meet a minimum of 30 kcal/kg and 1.5 g PRO/kg daily (per dosing wt 55 kg).    Monitoring/Evaluation  Progress toward goals will be monitored and evaluated per protocol.    Katie Sewell RD, LD  (MICU dietitian, 0332 (Mon-Fri))

## 2021-02-18 NOTE — PROGRESS NOTES
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  Patient was admitted to OSH on  for acute hypoxemic respiratory failure and new lung opacities. Intubated  and transferred to [facility] for ongoing management.  Extubated  but reintubated - for progressive hypoxemia.  Rapid decompensation 2/3 with ARDS presentation requiring reintubation, prone positioning, and inhaled epoprostenol, suspected d/t worsening PJP. S/p Trach , and PEG-J (). Now with improved oxygenation and tolerating TD throughout the day. Planning for bronch with right mainstem dilation with IP this week. Tentative discharge to ARU early next week.     Today's recommendations:  - Consider SLP consult for PMSV  - Agree with right mainstem dilation by IP this week  - Continue enteral Posaconazole 300 mg TID (increased ), repeat level  (ordered), recommend monitoring LFTs and QTc  - Tacrolimus level today is therapeutic, no dose change. Next level  (ordered)  - Continue VGCV for CMV ppx through  (one week after steroid burst completion)  - Repeat EBV DNA  (ordered)    Acute hypoxemic respiratory failure:   Right post-obstructive Stenotrophomonas and Eikenella pneumonia:  PJP pneumonia:  Septic shock:   Airway stenosis with distal plugging:  ARDS: Presented to OSH ED with increased SOB and hypoxia, initially requiring 4L NC but continued to decline and subsequently intubated .  Hemodynamic  instability after intubation requiring pressors, transferred to [facility].  Afebrile but with leukocytosis.  COVID and respiratory panel negative.  PTA bronch (12/23/20) with moderate airway obstruction and RML/RLL mucous plugging, cultures with Stenotrophomonas and Eikenella (IV ceftazidime 1/13-1/19).  OSH CT chest with new multifocal GGO bilaterally and increased left loculated pleural effusion.  Repeat bronch (1/24) with RUL BAL with thick copious secretions to RML/RLL, PJP PCR positive.  Extubated on 1/26, but reintubated from 1/27-1/29 for progressive hypoxemia.  Remained on either HFNC % FiO2 or BiPAP % FiO2, then eventually decompensated on 2/3 and again reintubated.  Workup revealing for ARDS, proning protocol initiated (2/3) with inhaled epoprostenol, pressors initiated.  Significant rise in leukocytosis (2/3) concerning for worsening PJP vs secondary infection.  CT chest (2/3) with progressive consolidation and possible superimposed infection findings, stable LLL chronic empyema. CT chest 2/10 with improvement in aeration bilaterally. Bronch 2/11 with known airway stenosis at right anastomosis, with moderate airway obstruction. S/p Trach 2/12. Completed burst steroids with taper through 2/17. Oxygenation improving and now tolerating TD throughout the day.  - Ventilator management per MICU, trach dome as able  - ABX per MICU: none currently. S/p 21 day course Bactrim (1/24-2/15) for PJP, which also covered the Eikenella and Stenothrophomonas on BAL from 12/23/2021. S/p empiric Zosyn (2/3-2/12)  - Chest physiotherapy QID and nebs: Albuterol QID and Mucomyst BID, and Pulmicort BID  - Consider SLP consult for PMSV  - CXR today with no change in the diffuse mixed interstitial and airspace opacities with suspected right pleural effusion (personally reviewed)     Aspergillus empyema (left-sided): First noted 10/8/19, negative in November and again on admission.  CT scan on 7/17/20 with increased mass-like  density, likely pleural-based, in LLL area s/p needle aspiration (8/13/20) with Aspergillus fumigatus on cultures s/p intrapleural amphotericin bead placement (11/20).  Follows with ID as OP.  LFTs stable on admission (ALP chronically mildly elevated).  CT chest with increased loculated left pleural effusion s/p thoracentesis (1/25), exudative with 87% neutrophils, very high LDL (6308), cytology normal, AFB pleural cultures NGTD-stain negative.  - Posaconazole (PTA plan for indefinite course). Changed to IV posaconazole 2/9/2021 d/t subtherapeutic level despite increase. Repeat level 2/16 still subtherapeutic (0.3), discussed with pharmacy, transitioned back to enteral and increased to TID dosing. 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 DSA (1/25) not detected.  -  IP to manage right bronchial stenosis, agree with dilation this week by IP     Immunosuppression: On 2 drug IST d/t recurrent infections  - Tacrolimus 2 mg qAM/ 2.5 mg qPM.  Goal level 8-10. Level 2/18 therapeutic at 8.8. no dose adjustment. Next level 2/21 (ordered)  - Chronic prednisone 5 mg qAM / 2.5 mg qPM (transitioned from burst steroids 2/18)     Prophylaxis:   - Bactrim PPx dose as above     H/o CMV viremia: CMV D+/R+.  CMV <137 (1/25).  - Continue VGCV for CMV ppx through 2/24 (one week after steroid burst completion)     EBV viremia: Level 12/9/20 mildly elevated to 10K (log 4) from prior 3K (3.5 log) on 9/9/20, repeat (1/25/21) decreased to 5619 copies (log of 3.8).  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 Chronic iHD M/Th via AVF with partial graft.  Not able to tolerate iHD 2/3 d/t hypotension, line placed and transitioned to CRRT 2/4. Now back to iHD   - Monitoring of tacrolimus as above  -  "Dialysis management per nephrology     Atrial fibrillation w/ RVR: H/o paroxysmal AF, last 10/2019.  Recurrence 1/30 with RVR (-140s), likely in the setting of acute pulmonary illness.  - Management per MICU: Metoprolol 50 mg BID      Oropharyngeal candidiasis: White tongue plaque noted 2/1.  - Nystatin QID (2/1)      We appreciate the excellent care provided by the MICU team. Recommendations communicated via in person rounding and this note. Will continue to follow along closely, please do not hesitate to call with any questions or concerns.      Patient discussed with Dr. Fermin Ibarra, APRN, CNP   Inpatient Nurse Practitioner  Pulmonary CF/Transplant     Subjective & Interval History:     Patient sitting up in chair awake and conversant by mouthing words. Slept well last night. Tolerated TD for 12 hours yesterday, placed back on vent (18/320/5/35) overnight to allow patient to sleep comfortably. Mild SOB is improving. Minimal cough, denies congestion or chest pain. Able to expectorate sputum, minimal suctioning by nursing. Tolerating TF at goal. No nausea. Stools loose, stable.    Review of Systems:     C: No fever, no chills, no change in weight  INTEGUMENTARY/SKIN: No rash or obvious new lesions  ENT/MOUTH: No sore throat, no sinus pain, no nasal congestion or drainage  RESP: See interval history  CV: No chest pain, no palpitations, no peripheral edema, no orthopnea  GI: No nausea, no vomiting, no change in stools, no reflux symptoms  : No dysuria, +oliguria  MUSCULOSKELETAL: No myalgias, no arthralgias  ENDOCRINE: Blood sugars with adequate control  NEURO: No headache, no numbness or tingling  PSYCHIATRIC: Mood stable    Physical Exam:     Vital signs:  Temp: 98.2  F (36.8  C) Temp src: Oral BP: 104/79 Pulse: 105   Resp: 20 SpO2: 100 % O2 Device: Trach dome Oxygen Delivery: 40 LPM Height: 160 cm (5' 2.99\") Weight: 54.7 kg (120 lb 9.5 oz)  I/O:     Intake/Output Summary (Last 24 hours) at " 2/18/2021 1115  Last data filed at 2/18/2021 0900  Gross per 24 hour   Intake 1372 ml   Output 3000 ml   Net -1628 ml     Constitutional: sitting in chair, in no apparent distress.   HEENT: Eyes with pink conjunctivae, anicteric. Oral mucosa moist without lesions. Neck supple without lymphadenopathy.   PULM: Good air flow bilaterally. Scattered crackles bilaterally, no rhonchi, no wheezes. Non-labored breathing on TD.  CV: Normal S1 and S2. RRR. No murmur, gallop, or rub. No peripheral edema.   ABD: NABS, soft, nontender, nondistended. Peg-J  MSK: Moves all extremities. No apparent muscle wasting.   NEURO: Alert and oriented, nonverbal communication with mouthing words and gestures  SKIN: Warm, dry. No rash on limited exam.   PSYCH: Mood stable, calm.     Lines, Drains, and Devices:  Peripheral IV 02/16/21 Right;Posterior Lower forearm (Active)   Site Assessment [facility] 02/18/21 0800   Line Status Saline locked;Checked every 1-2 hour 02/18/21 0800   Phlebitis Scale 0-->no symptoms 02/18/21 0800   Infiltration Scale 0 02/18/21 0800   Infiltration Site Treatment Method  None 02/18/21 0800   Number of days: 2       CVC Double Lumen 10/25/19 Right Internal jugular (Active)   Number of days: 482       Hemodialysis Vascular Access Arteriovenous fistula Left Arm (Active)   Site Assessment [facility] 02/18/21 0800   Cannulation Needle Size 15 02/17/21 1015   Dressing Intervention New dressing 02/17/21 1348   Dressing Status Clean, dry, intact 02/18/21 0800   Verification by x-ray Not applicable 02/17/21 1348   Hand Off Report Yes 02/17/21 1348   Number of days: 24     Data:     LABS    CMP:   Recent Labs   Lab 02/18/21  0430 02/16/21  0612 02/15/21  0418 02/14/21  0336 02/13/21  0323 02/12/21  0315    134 136 136 134 135   POTASSIUM 3.6 3.6 3.9 3.8 4.2 4.1   CHLORIDE 100 99 100 100 100 100   CO2 27 28 27 29 26 28   ANIONGAP 8 7 8 7 8 7   * 154* 155* 149* 142* 175*   BUN 29 34* 52* 32* 52* 33*   CR 2.19* 2.27* 2.75* 1.84*  2.59* 1.71*   GFRESTIMATED 24* 23* 18* 30* 20* 32*   GFRESTBLACK 28* 27* 21* 34* 23* 38*   CHELSEY 8.4* 8.9 8.6 8.6 8.7 8.6   MAG  --   --  1.9 1.8 2.4* 1.7   PHOS  --   --  4.5 3.6 5.3* 3.6     CBC:   Recent Labs   Lab 02/18/21  0430 02/16/21  0612 02/15/21  1026 02/15/21  0418 02/14/21  0336   WBC 3.2* 4.0  --  3.7* 5.3   RBC 2.87* 2.87*  --  2.25* 2.46*   HGB 8.8* 8.7* 8.4* 6.7* 7.5*   HCT 28.3* 27.4*  --  21.9* 24.0*   MCV 99 96  --  97 98   MCH 30.7 30.3  --  29.8 30.5   MCHC 31.1* 31.8  --  30.6* 31.3*   RDW 21.3* 21.1*  --  21.8* 21.2*    145*  --  116* 109*       INR: No lab results found in last 7 days.    Glucose:   Recent Labs   Lab 02/18/21  0905 02/18/21  0441 02/18/21  0430 02/18/21  0032 02/17/21  2037 02/17/21  1514 02/17/21  1128 02/16/21  0612 02/16/21  0612 02/15/21  0418 02/15/21  0418 02/14/21  0336 02/14/21  0336 02/13/21  0323 02/13/21  0323 02/12/21  0315 02/12/21  0315   GLC  --   --  158*  --   --   --   --   --  154*  --  155*  --  149*  --  142*  --  175*   * 158*  --  129* 142* 189* 170*   < >  --    < >  --    < >  --    < >  --    < > 173*    < > = values in this interval not displayed.       Blood Gas: No lab results found in last 7 days.    Culture Data No results for input(s): CULT in the last 168 hours.    Virology Data:   Lab Results   Component Value Date    FLUAH1 Not Detected 01/24/2021    FLUAH3 Not Detected 01/24/2021    CH0020 Not Detected 01/24/2021    IFLUB Not Detected 01/24/2021    RSVA Not Detected 01/24/2021    RSVB Not Detected 01/24/2021    PIV1 Not Detected 01/24/2021    PIV2 Not Detected 01/24/2021    PIV3 Not Detected 01/24/2021    HMPV Not Detected 01/24/2021    HRVS Negative 01/24/2021    ADVBE Negative 01/24/2021    ADVC Negative 01/24/2021    ADVC Negative 12/23/2020    ADVC Negative 10/07/2019       Historical CMV results (last 3 of prior testing):  Lab Results   Component Value Date    CMVQNT <137 (A) 01/25/2021    CMVQNT 238 (A) 01/24/2021     CMVQNT 675 (A) 12/23/2020     Lab Results   Component Value Date    CMVLOG <2.1 01/25/2021    CMVLOG 2.4 (H) 01/24/2021    CMVLOG 2.8 (H) 12/23/2020       Urine Studies    Recent Labs   Lab Test 01/24/21  1729 10/21/19  2240   URINEPH 5.0 5.0   NITRITE Negative Negative   LEUKEST Moderate* Large*   WBCU 34* 115*       Most Recent [place]ze Pulmonary Function Testing (FVC/FEV1 only)  FVC-Pre   Date Value Ref Range Status   12/09/2020 1.12 L    09/09/2020 1.56 L    03/09/2020 1.57 L    02/19/2020 1.78 L      FVC-%Pred-Pre   Date Value Ref Range Status   12/09/2020 34 %    09/09/2020 47 %    03/09/2020 48 %    02/19/2020 54 %      FEV1-Pre   Date Value Ref Range Status   12/09/2020 0.98 L    09/09/2020 1.10 L    03/09/2020 0.96 L    02/19/2020 0.99 L      FEV1-%Pred-Pre   Date Value Ref Range Status   12/09/2020 37 %    09/09/2020 42 %    03/09/2020 37 %    02/19/2020 38 %        IMAGING    Recent Results (from the past 48 hour(s))   IR Gastro Jejunostomy Tube Placement    Narrative    PROCEDURES 264 and 21:  1. Gastrojejunostomy tube placement under fluoroscopic guidance.    Clinical History: 60-year-old female with ILD status post lung  transplant requiring feeding tube for nutrition.    Comparisons: CT abdomen pelvis 2/9/2021.    Staff Radiologist: Shree Ayers MD    Fellow(s): Meet Rich M.D.    Medications: The patient was placed on continuous monitoring.  Intravenous sedation was administered. Patient received 50 mg of  fentanyl and 1 mg of Versed. Vital signs and sedation monitored by  nursing staff under Interventional Radiologist's supervision. The  patient remained stable throughout the procedure.    Sedation time: 25 minutes face-to-face    Fluoroscopy time: 12.1 minutes.    Dose: 72.3 mGy    PROCEDURE: The patient understood the limitations, alternatives, and  risks of the procedure and requested the procedure be performed. Both  written and oral consent were obtained.    Pre-procedural ultrasound  performed to delineate the liver margins.  Indwelling nasojejunal tube was pulled back into the stomach under  fluoroscopic guidance.    The left upper quadrant was prepped and draped in the usual sterile  fashion. 1% lidocaine without epinephrine was used for local  anesthesia.     Glucagon administered. Stomach inflated with air through the  nasogastric tube. Under fluoroscopic guidance, gastropexy was made  with 2 [employer] Saf-T-Pex[employer]* T-fasteners.    Needle gastrostomy made under fluoroscopic guidance with the 18 gauge  Safety Introducer needle. Needle removed over guidewire. Proximal  jejunum catheterized with the 5 [place] RAFAEL 1 catheter over guidewire  under fluoroscopic guidance. The guidewire was exchanged for a stiff  angled Glidewire. Catheter exchanged over Glidewire for the 22 [place]  introducer dilator/peel-away sheath. Dilator exchanged over guidewire  for the 18 [place] 45 cm [employer] JAYME Transgastric-Jejunal  feeding tube through the peel-away sheath. Guidewire and peel-away  sheath removed. Tube balloon inflated and tube secured. Adequate  placement of gastric and jejunal ports documented with contrast.     Fluoroscopic image documenting tube placement was saved in the  patient's record.     Ports flushed with saline. Sterile dressing applied. Gastric port  placed to gravity drainage. Nasogastric tube removed. No immediate  complication.       Impression    IMPRESSION:   1. 18 [place] 45 cm [employer] JAYME Transgastric-Jejunal  gastrojejunostomy tube placed under fluoroscopic guidance. Gastric  port to gravity drainage.    PLAN:  Bedrest for 2 hours.    Procedure performed by Dr. Rich under my supervision.  I, Dr. Yamini Ayers, was present for the entire procedure.    I have personally reviewed the examination and initial interpretation  and I agree with the findings.    YAMINI AYERS MD   XR Chest Port 1 View    Narrative    EXAM: XR CHEST PORT 1 VW  2/18/2021 5:50 AM      HISTORY:  S/p Trach and acute resp failure. H/o Álvaro sltx. PJP pna.       COMPARISON:  2/12/2021    FINDINGS:   AP portable view of the chest. Postoperative changes of bilateral lung  transplantation with intact median sternotomy wires and mediastinal  surgical clips. Surgical clips project over the right axilla.  Tracheostomy tube tip projects in the mid thoracic trachea. Low lung  volumes. No appreciable pneumothorax. Suspected small right pleural  effusion. Midline trachea. Stable enlargement of the cardiac  silhouette.      Impression    IMPRESSION:   1. Interval removal of a right IJ central venous catheter and enteric  tube.  2. No definite change in the diffuse mixed interstitial and airspace  opacities with suspected right pleural effusion.    I have personally reviewed the examination and initial interpretation  and I agree with the findings.    MEHNAZ CHAPMAN MD

## 2021-02-18 NOTE — PROGRESS NOTES
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 Nephrology consutled for dialysis needs.       Assessment and  Recommendation (MARIA A)      Mrs Blue is a 60 yof with ESRD and hx of lung tx admitted with PNA. Had been on 2x/week HD prior to admission, now admitted on 1/24/2021 for management of resp failure.  Nephrology consulted for management of dialysis.      Melissa Estevez on 2/18/2021 at 1:24 PM   I have communicated the plan with the primary team via verbal communication.      Seen and discussed with ADRIÁN Dunn CNS  Clinical Nurse Specialist  [phone]          Medical Student Interval History MARIA A Interval History   Medical student: Interval History  Mrs Blue continues to improve, has been running HD every other day. Yesterday was able to pull 3L over 3.5 hours. Will take today off and run again tomorrow.   ESRD-secondary to tacro use, has been on HD close to a year, has AVF with partial graft, difficult access but works well. Was running Monday /Thursday, will likely need to run 3 times/week moving forward.   Volume Status- Edema improving, close to admission weight, will plan for same amount of UF tomorrow during HD run.   Electrolytes/ pH- K- 3.6 bicarb 27  Ca/phos/pth- Ca 8.4, phos not checked today, last checked 2/15 was 4.5  Anemia- Hgb stable at 8.8 today, trending up slowly in the last few days.     Review of Systems  Gen: No fevers or chills  CV: No CP at rest  Resp: No SOB at rest  GI: No N/V Mrs Satterlie  "was seen on HD today, pulling 3L as she still has some abdominal edema, did run 3.5h to achieve, once closer to euvolemic we may be able to go down to 3h runs depending on intake/UF needs.  Next run planned for 2/19, on Epo 4k with runs, last Hgb 8.7, may increase dose as course progresses.       ESRD-Runs at [place] through [facility].  Has atypical HD schedule of Monday and Thursday, has AVF with partial graft which has been challenging to access per RN's.  Needed CRRT 2/5-2/8 due to hemodynamic instability but otherwise has run iHD.  Working on getting to stable 3x/week schedule, has trach and PEG, working on tx to TCU.                 -HD every other day, plan for run tomorrow.                -Has L arm AVF/graft, somewhat challenging access.                -Removed temp HD line on 2/9.        Volume status-Closer to euvolemic, wt not done today but will see how it looks in the am.       Electrolytes/pH-K 3.6, bicarb 27.       Ca/phos/pth-Ca 8.4, no Mg or Phos today.      Anemia-Hgb 8.8, has needed intermittent PRBC's.  Checked iron stores and sats 36% on 2/3, started EPO 4k with runs.       Nutrition-Nutren TF.       Review of Systems:   Gen: No fevers or chills  CV: No CP at rest  Resp: No SOB at rest  GI: No N/V      Physical Exam (Student)  Vitals were reviewed  /79   Pulse 108   Temp 98.2  F (36.8  C) (Oral)   Resp (!) 37   Ht 1.6 m (5' 2.99\")   Wt 54.7 kg (120 lb 9.5 oz)   LMP 06/07/2014 (Exact Date)   SpO2 100%   BMI 21.37 kg/m        Intake/Output Summary (Last 24 hours) at 2/18/2021 1324  Last data filed at 2/18/2021 0900  Gross per 24 hour   Intake 1282 ml   Output 3000 ml   Net -1718 ml      GENERAL APPEARANCE: Vent via trached.   EYES: no scleral icterus  Endo: no moon facies  Pulmonary: equal expansion.    CV: regular rhythm, normal rate - Edema present  GI: soft, non distended  MS: no evidence of inflammation in joints, no muscle tenderness  :  no rivas, making " "very little urine, if any   SKIN: warm, dry, +1 edema.    NEURO: No focal deficits.      Physical Exam (Physician)  Vitals were reviewed  /79   Pulse 108   Temp 98.2  F (36.8  C) (Oral)   Resp (!) 37   Ht 1.6 m (5' 2.99\")   Wt 54.7 kg (120 lb 9.5 oz)   LMP 06/07/2014 (Exact Date)   SpO2 100%   BMI 21.37 kg/m       Intake/Output Summary (Last 24 hours) at 2/18/2021 1324  Last data filed at 2/18/2021 0900  Gross per 24 hour   Intake 1282 ml   Output 3000 ml   Net -1718 ml        GENERAL APPEARANCE: Vent via trach.   EYES: no scleral icterus  Endo: no moon facies  Pulmonary: equal expansion.    CV: regular rhythm, normal rate -   GI: soft, non distended, Some abd Edema present  MS: no evidence of inflammation in joints, no muscle tenderness  :  no rivas, making very little urine  SKIN: warm, dry, no peripheral edema.    NEURO: No focal deficits.      Labs:   The following labs were reviewed:   CMP  Recent Labs   Lab 02/18/21  0430 02/16/21  0612 02/15/21  0418 02/14/21  0336 02/13/21  0323 02/12/21  0315    134 136 136 134 135   POTASSIUM 3.6 3.6 3.9 3.8 4.2 4.1   CHLORIDE 100 99 100 100 100 100   CO2 27 28 27 29 26 28   ANIONGAP 8 7 8 7 8 7   * 154* 155* 149* 142* 175*   BUN 29 34* 52* 32* 52* 33*   CR 2.19* 2.27* 2.75* 1.84* 2.59* 1.71*   GFRESTIMATED 24* 23* 18* 30* 20* 32*   GFRESTBLACK 28* 27* 21* 34* 23* 38*   CHELSEY 8.4* 8.9 8.6 8.6 8.7 8.6   MAG  --   --  1.9 1.8 2.4* 1.7   PHOS  --   --  4.5 3.6 5.3* 3.6     CBC  Recent Labs   Lab 02/18/21  0430 02/16/21  0612 02/15/21  1026 02/15/21  0418 02/14/21  0336   HGB 8.8* 8.7* 8.4* 6.7* 7.5*   WBC 3.2* 4.0  --  3.7* 5.3   RBC 2.87* 2.87*  --  2.25* 2.46*   HCT 28.3* 27.4*  --  21.9* 24.0*   MCV 99 96  --  97 98   MCH 30.7 30.3  --  29.8 30.5   MCHC 31.1* 31.8  --  30.6* 31.3*   RDW 21.3* 21.1*  --  21.8* 21.2*    145*  --  116* 109*     INR  Recent Labs   Lab 02/12/21  0315   PTT 28     ABGNo lab results found in last 7 days.   URINE " STUDIES  Recent Labs   Lab Test 01/24/21  1729 10/21/19  2240 09/12/19  0125 08/07/19  1512   COLOR Yellow Yellow Light Yellow Yellow   APPEARANCE Slightly Cloudy Cloudy Clear Clear   URINEGLC Negative Negative Negative Negative   URINEBILI Negative Negative Negative Negative   URINEKETONE 5* Negative 10* Negative   SG 1.023 1.018 1.008 1.016   UBLD Small* Trace* Negative Negative   URINEPH 5.0 5.0 7.5* 7.0   PROTEIN 30* 30* 30* 100*   NITRITE Negative Negative Negative Negative   LEUKEST Moderate* Large* Negative Trace*   RBCU 26* 25* <1 10*   WBCU 34* 115* 3 19*     Recent Labs   Lab Test 08/07/19  1512   UTPG 2.47*     PTH  No lab results found.  IRON STUDIES  Recent Labs   Lab Test 02/03/21  0415 12/13/18  1033 08/01/18  0921 05/08/18  0709   IRON 51 16* 93 48   * 221* 248 275   IRONSAT 36 7* 37 18   YOLA  --  302* 571*  --      Imaging:    Current Medications:    acetylcysteine  2 mL Nebulization BID     albuterol  2.5 mg Nebulization Q6H     B and C vitamin Complex with folic acid  5 mL Oral or Feeding Tube Daily     budesonide  0.5 mg Nebulization BID     [START ON 2/19/2021] desmopressin (DDAVP) infusion  0.3 mcg/kg (Dosing Weight) Intravenous Once     fiber modular (NUTRISOURCE FIBER)  1 packet Per Feeding Tube Daily     heparin ANTICOAGULANT  5,000 Units Subcutaneous Q8H     insulin aspart  1-12 Units Subcutaneous Q4H     insulin glargine  10 Units Subcutaneous QAM AC     metoprolol tartrate  50 mg Oral or Feeding Tube BID     OLANZapine  10 mg Oral or Feeding Tube Q24H     pantoprazole  40 mg Oral QAM AC     posaconazole  300 mg Oral or Feeding Tube TID     predniSONE  2.5 mg Oral or Feeding Tube QPM     predniSONE  5 mg Oral or Feeding Tube QAM     protein modular  1 packet Per Feeding Tube BID     sodium chloride  3 mL Nebulization BID     sulfamethoxazole-trimethoprim  10 mL Oral or Feeding Tube Once per day on Mon Wed Fri     tacrolimus  2 mg Oral or Feeding Tube Q24H     tacrolimus  2.5 mg Oral  or Feeding Tube Q24H     valGANciclovir  450 mg Oral Once per day on Mon Thu       dextrose Stopped (02/16/21 1644)     sodium chloride 10 mL/hr at 02/08/21 0400     Melissa Estevez

## 2021-02-18 NOTE — PLAN OF CARE
ICU End of Shift Summary. See flowsheets for vital signs and detailed assessment.    Changes this shift: Good sleep, switched to CMV settings for rest overnight per plan of care.  Several soft pressures with MAPs 60-65 when asleep, improved in AM.      Plan: Trach dome today, airway dilation procedure tomorrow.  Continue to encourage rehab/therapies.

## 2021-02-18 NOTE — PROGRESS NOTES
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 Found to have PCP pneumonia.    CHANGES and MAJOR THINGS TODAY:   - Plan for trach dome during the day 12-14hr, and full support vent overnight for rest    - iHD yesterday removed 3L  - IP, planning R mainstem dilation this Friday (Hold heparin and TF at Mid night)   - Discharge planning to LTACH vs TCU   - Walking and trach doming well  - Suture trach removal POD 7 (on 2/19)  - Weekly COVID test per hospital policy, next 2/19  - Pulmonary following: appreciate recommendation    - Continue VGCV for CMV ppx through 2/24    - Repeat EBV DNA 2/22   - Ordered SLP consult for PMSV    PLAN:  Neuro:  # Pain and sedation  - Off Precedex post tracheostomy   - prn hydroxyzine for anxiety    # Delirium- resolved   # Encephalopathy, septic- resolved   - Scheduled 10 mg at bedtime and PRN Zyprexa     # Insomnia  - Melatonin available prn    # Anisocoria (L>R)  Intermittently fixed and dilated pupils. CTH unremarkable. Neuro examination unremarkable.  - Opthomalogy evaluated on 2/1    Pulmonary:  # Acute Hypoxic Respiratory Failure  # Tracheostomy (2/12/21)  Suspected 2/2 PJP initially. OSH CT 1/23 showed bilateral ground glass opacities and consolidative lung infiltrates centrally distributed to the upper lobes and RLL. COVID negative x3 at outside hospital. Concern for secondary pneumonia given worsening clinical picture. 2/4 CT chest indicated worsening consolidations consistent with  progression of pneumocystis with possibilty of superimposed secondary pneumonia. Previously on inhaled epoprostenol. S/p Perc tracheostomy 2/12 with interventional pulm. Mechanical ventilation AC 18/320/+8/50% - dropped RR to 18 from 24; wean FiO2 to 40% and then PEEP to 5 after tracheostomy 2/12. Last dose of Zosyn 1/12.   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  - Positive PJP PCR on 1/28 with positive fungitel   - Continue PTA tacrolimus   - Continue VGCV for CMV ppx through 2/24   - Repeat EBV DNA 2/22   - Started home dose Prednisone 2/18, completed taper dose   - Pulmonary lung transplant team consulted and following, appreciate recs    - IP, planning R mainstem dilation 2/19   - Continue enteral Posaconazole 300 mg TID (increased 2/17), repeat level 2/23 (ordered), recommend monitoring LFTs and QTc    Cardiovascular:  # Shock, resolved  # Transient hypotension related to dialysis, sleep- Resolved  Last ECHO 10/21/20 showed EF 60-65%, normal LV and RV function.   - OFF norepinephrine  - D/c midodrine to 10 mg q8 hours    # Afib with RVR, resolved  # ST  Intermittently going in and out of A-fib, currently ST.  - Continue to monitor  - Metoprolol 50mg BID    # Hx HTN  - Hold PTA carvedilol and amlodipine    GI/Nutrition:  # Portal HTN  Liver biopsy positive for congestive hepatopathy. Previous had ascites thought to be r/t elevated right sided heart pressures. Last saw GI on 12/21/20 and patient endorsed that her  ascites is no longer present. Etiology unclear.   - NTD    #Nutrition  - IR PEG-J tube placement 2/16, currently on TF  - Daily fiber via PEG-J  tube      # Constipation-resolved and now with loose stools  - Hx of constipation with bactrim, reports frequent BMs  - Change schedule bowel regimen to PRN, due to multiple loose stools     Renal/Fluids/Electrolytes:  # ESRD on iHD twice weekly(M/Th)  # Anion gap metabolic acidosis r/t ESRD, uremia, resolved  Outpatient dry body weight 56.5 kg. CRRT 2/4 to 2/8. Last HD 2/11 for 2L off   - Nephrology following   - iHD per nephrology   - I&O  - Daily weights   - Trend BMP     # Hypomagnesemia- resolved  - Replace Mag x1    Endocrine:  # Hyperglycemia, steroid induced  - High intensity sliding scale insulin  - 10 units glargine daily    # Seronegative RA with Raynaud's   - NTD, monitor     ID:  # Sepsis in setting of PJP pneumonia, possible secondary pneumonia  Suspected related to PJP pneumonia. OSH CT 1/23 + bilateral ground glass opacities and consolidative lung infiltrates centrally distributed to the upper lobes and RLL. Covid-19 negative X3 at OSH. Procalcitonin negative. Urine strep, CMV, and RPP negative. Given acute and severe decompensation, concern for secondary pneumonia. 2/4 CT chest which displayed worsening consolidations consistent with progression of pneumocystis with the possibility of superimposed secondary pneumonia.  - PJP PCR positive, fungitell positive as well  - Last dose of Bactrim 21 days today,  switch to ppx dose  2/15; prednisone taper  - Zosyn 10-day course  completed on 2/12  - s/p thora 1/25, fluid exudative, negative w/u  - Weekly COVID test per hospital policy, next 2/19    # Chronic/Stable right aspergillus empyema   - Continue posaconazole, treatment dosing, will monitor QTc intermittently (431 on 2/11)  - Posaconazole level ordered for 2/16   -consider transitioning to oral once gj tube placed    Hematology:    # Anemia r/t renal disease,  chronic stable   # Thrombocytopenia r/t critical illness  # Leukocytosis, infection/steroids  Takes Aranesp 25 mcg every four weeks, last dose 1/14, next scheduled was 2/11.   Currently receiving epoetin alisha-epox with dialysis   - Discussed with nephrology, will add replacement with HD run on 2/13   -iHD today  - S/p 1PRBC transfusion for hgb 6.7, recheck 8.4, no sign of bleeding. Goal to minimize ICU lab draws as much as possible     Musculoskeletal:  # Generalized weakness  - PT/OT  - Increase activity, up to chair, PT following    Skin:  # Dermatomyositis antitryptase syndrome   - Noted, NTD    General Cares/Prophylaxis:    DVT Prophylaxis: Heparin subcutaneous (will hold for procedure)   GI Prophylaxis: PPI  Restraints: none  Family Communication:  updated at the bedside  Code Status: full    Lines/tubes/drains:  - trach  - HD fistula left forearm  - CVC Left IJ  - Art line - to be removed 2/14  - NJT    Disposition:  - Medical ICU     Patient seen and findings/plan discussed with medical ICU staff, Dr. Robert Quinones MD  Internal Medicine, PGY-1  [facility]   Pager: [phone]    ====================================  INTERVAL HISTORY:   No acute events overnight. Had a good sleep. Denies  pain or any other concerns.   at the bedside.     OBJECTIVE:   1. VITAL SIGNS:   Temp:  [97.4  F (36.3  C)-98.3  F (36.8  C)] (P) 98.1  F (36.7  C)  Pulse:  [] 115  Resp:  [10-33] 18  BP: ()/(55-80) 112/72  FiO2 (%):  [35 %] (P) 35 %  SpO2:  [91 %-100 %] 100 %  Ventilation Mode: CMV/AC  (Continuous Mandatory Ventilation/ Assist Control)  FiO2 (%): 35 %  Rate Set (breaths/minute): 18 breaths/min  Tidal Volume Set (mL): 320 mL  PEEP (cm H2O): 5 cmH2O  Pressure Support (cm H2O): 10 cmH2O  Oxygen Concentration (%): 35 %  Resp: 18    2. INTAKE/ OUTPUT:   I/O last 3 completed shifts:  In: 1717 [I.V.:275; NG/GT:407]  Out: 3000 [Other:3000] Urine Mixed with stool, unable to  monitor accurately per nursing staff    3. PHYSICAL EXAMINATION:  General: Laying in bed, intubated via trachostomy and alert  Neuro: eyes open, following commands, alert and oriented x4  Pulm/Resp: Coarse breath sounds bilaterally, breathing non-labored  CV: ST. Pulses intact. No edema.   Abdomen: Soft, non-distended, non-tender, bowel sounds active.   Incisions/Skin: Left AV fistula with thrill and bruit    4. LABS:   Arterial Blood Gases   No lab results found in last 7 days.  Complete Blood Count   Recent Labs   Lab 02/18/21  0430 02/16/21  0612 02/15/21  1026 02/15/21  0418 02/14/21  0336   WBC 3.2* 4.0  --  3.7* 5.3   HGB 8.8* 8.7* 8.4* 6.7* 7.5*    145*  --  116* 109*     Basic Metabolic Panel  Recent Labs   Lab 02/18/21  0430 02/16/21  0612 02/15/21  0418 02/14/21  0336    134 136 136   POTASSIUM 3.6 3.6 3.9 3.8   CHLORIDE 100 99 100 100   CO2 27 28 27 29   BUN 29 34* 52* 32*   CR 2.19* 2.27* 2.75* 1.84*   * 154* 155* 149*     Liver Function Tests  No lab results found in last 7 days.  Coagulation Profile  Recent Labs   Lab 02/12/21  0315   PTT 28       5. RADIOLOGY:   No results found for this or any previous visit (from the past 24 hour(s)).

## 2021-02-18 NOTE — PROGRESS NOTES
Care Management Follow Up    Length of Stay (days): 25    Supportive visit at bedside with patient and spouse.  Both very pleased with patient progress.  Hopeful for recovery.  Reviewed various scenarios for discharge LTACH vs ARU vs. TCU and implications for dialysis at each setting.  Reviewed visitor policies at each setting.  Patient is hoping she will get off vent so that she can go to [facility] ARU or TCU.  Referral has been made.     PLAN: will continue to follow for support, counseling, education and resources.              DANA RoseSW

## 2021-02-19 ENCOUNTER — APPOINTMENT (OUTPATIENT)
Dept: PHYSICAL THERAPY | Facility: CLINIC | Age: 61
End: 2021-02-19
Attending: INTERNAL MEDICINE
Payer: COMMERCIAL

## 2021-02-19 ENCOUNTER — ANESTHESIA (OUTPATIENT)
Dept: SURGERY | Facility: CLINIC | Age: 61
End: 2021-02-19
Payer: COMMERCIAL

## 2021-02-19 ENCOUNTER — APPOINTMENT (OUTPATIENT)
Dept: SPEECH THERAPY | Facility: CLINIC | Age: 61
End: 2021-02-19
Attending: INTERNAL MEDICINE
Payer: COMMERCIAL

## 2021-02-19 DIAGNOSIS — J98.09 BRONCHIAL STENOSIS, RIGHT: Primary | ICD-10-CM

## 2021-02-19 LAB
ANION GAP SERPL CALCULATED.3IONS-SCNC: 5 MMOL/L (ref 3–14)
BUN SERPL-MCNC: 46 MG/DL (ref 7–30)
CALCIUM SERPL-MCNC: 8.8 MG/DL (ref 8.5–10.1)
CHLORIDE SERPL-SCNC: 100 MMOL/L (ref 94–109)
CO2 SERPL-SCNC: 29 MMOL/L (ref 20–32)
COPATH REPORT: NORMAL
CREAT SERPL-MCNC: 3.14 MG/DL (ref 0.52–1.04)
ERYTHROCYTE [DISTWIDTH] IN BLOOD BY AUTOMATED COUNT: 21.4 % (ref 10–15)
GFR SERPL CREATININE-BSD FRML MDRD: 16 ML/MIN/{1.73_M2}
GLUCOSE BLDC GLUCOMTR-MCNC: 101 MG/DL (ref 70–99)
GLUCOSE BLDC GLUCOMTR-MCNC: 106 MG/DL (ref 70–99)
GLUCOSE BLDC GLUCOMTR-MCNC: 118 MG/DL (ref 70–99)
GLUCOSE BLDC GLUCOMTR-MCNC: 138 MG/DL (ref 70–99)
GLUCOSE BLDC GLUCOMTR-MCNC: 146 MG/DL (ref 70–99)
GLUCOSE BLDC GLUCOMTR-MCNC: 97 MG/DL (ref 70–99)
GLUCOSE SERPL-MCNC: 102 MG/DL (ref 70–99)
GRAM STN SPEC: ABNORMAL
GRAM STN SPEC: ABNORMAL
HCT VFR BLD AUTO: 29.3 % (ref 35–47)
HGB BLD-MCNC: 9 G/DL (ref 11.7–15.7)
INR PPP: 0.99 (ref 0.86–1.14)
MCH RBC QN AUTO: 30.4 PG (ref 26.5–33)
MCHC RBC AUTO-ENTMCNC: 30.7 G/DL (ref 31.5–36.5)
MCV RBC AUTO: 99 FL (ref 78–100)
PLATELET # BLD AUTO: 207 10E9/L (ref 150–450)
POTASSIUM SERPL-SCNC: 4 MMOL/L (ref 3.4–5.3)
RBC # BLD AUTO: 2.96 10E12/L (ref 3.8–5.2)
SODIUM SERPL-SCNC: 134 MMOL/L (ref 133–144)
SPECIMEN SOURCE: ABNORMAL
WBC # BLD AUTO: 3 10E9/L (ref 4–11)

## 2021-02-19 PROCEDURE — 250N000013 HC RX MED GY IP 250 OP 250 PS 637: Performed by: INTERNAL MEDICINE

## 2021-02-19 PROCEDURE — 250N000013 HC RX MED GY IP 250 OP 250 PS 637: Performed by: STUDENT IN AN ORGANIZED HEALTH CARE EDUCATION/TRAINING PROGRAM

## 2021-02-19 PROCEDURE — 250N000011 HC RX IP 250 OP 636: Performed by: STUDENT IN AN ORGANIZED HEALTH CARE EDUCATION/TRAINING PROGRAM

## 2021-02-19 PROCEDURE — 999N001017 HC STATISTIC GLUCOSE BY METER IP

## 2021-02-19 PROCEDURE — 99291 CRITICAL CARE FIRST HOUR: CPT | Mod: GC | Performed by: INTERNAL MEDICINE

## 2021-02-19 PROCEDURE — 258N000003 HC RX IP 258 OP 636: Performed by: CLINICAL NURSE SPECIALIST

## 2021-02-19 PROCEDURE — 36415 COLL VENOUS BLD VENIPUNCTURE: CPT | Performed by: STUDENT IN AN ORGANIZED HEALTH CARE EDUCATION/TRAINING PROGRAM

## 2021-02-19 PROCEDURE — 87205 SMEAR GRAM STAIN: CPT | Performed by: INTERNAL MEDICINE

## 2021-02-19 PROCEDURE — 250N000009 HC RX 250: Performed by: NURSE ANESTHETIST, CERTIFIED REGISTERED

## 2021-02-19 PROCEDURE — 92507 TX SP LANG VOICE COMM INDIV: CPT | Mod: GN

## 2021-02-19 PROCEDURE — 999N000157 HC STATISTIC RCP TIME EA 10 MIN

## 2021-02-19 PROCEDURE — 90937 HEMODIALYSIS REPEATED EVAL: CPT

## 2021-02-19 PROCEDURE — 94668 MNPJ CHEST WALL SBSQ: CPT

## 2021-02-19 PROCEDURE — 258N000003 HC RX IP 258 OP 636: Performed by: STUDENT IN AN ORGANIZED HEALTH CARE EDUCATION/TRAINING PROGRAM

## 2021-02-19 PROCEDURE — 250N000009 HC RX 250: Performed by: CLINICAL NURSE SPECIALIST

## 2021-02-19 PROCEDURE — 250N000011 HC RX IP 250 OP 636: Performed by: NURSE ANESTHETIST, CERTIFIED REGISTERED

## 2021-02-19 PROCEDURE — 370N000017 HC ANESTHESIA TECHNICAL FEE, PER MIN: Performed by: INTERNAL MEDICINE

## 2021-02-19 PROCEDURE — 99231 SBSQ HOSP IP/OBS SF/LOW 25: CPT | Mod: 25 | Performed by: INTERNAL MEDICINE

## 2021-02-19 PROCEDURE — 250N000012 HC RX MED GY IP 250 OP 636 PS 637: Performed by: INTERNAL MEDICINE

## 2021-02-19 PROCEDURE — 88312 SPECIAL STAINS GROUP 1: CPT | Mod: TC | Performed by: INTERNAL MEDICINE

## 2021-02-19 PROCEDURE — 0B738ZZ DILATION OF RIGHT MAIN BRONCHUS, VIA NATURAL OR ARTIFICIAL OPENING ENDOSCOPIC: ICD-10-PCS | Performed by: INTERNAL MEDICINE

## 2021-02-19 PROCEDURE — 94640 AIRWAY INHALATION TREATMENT: CPT

## 2021-02-19 PROCEDURE — 87102 FUNGUS ISOLATION CULTURE: CPT | Performed by: INTERNAL MEDICINE

## 2021-02-19 PROCEDURE — 87206 SMEAR FLUORESCENT/ACID STAI: CPT | Performed by: INTERNAL MEDICINE

## 2021-02-19 PROCEDURE — 31630 BRONCHOSCOPY DILATE/FX REPR: CPT | Mod: GC | Performed by: INTERNAL MEDICINE

## 2021-02-19 PROCEDURE — 92597 ORAL SPEECH DEVICE EVAL: CPT | Mod: GN

## 2021-02-19 PROCEDURE — 87070 CULTURE OTHR SPECIMN AEROBIC: CPT | Performed by: INTERNAL MEDICINE

## 2021-02-19 PROCEDURE — 250N000011 HC RX IP 250 OP 636: Performed by: CLINICAL NURSE SPECIALIST

## 2021-02-19 PROCEDURE — 634N000001 HC RX 634: Performed by: CLINICAL NURSE SPECIALIST

## 2021-02-19 PROCEDURE — 258N000003 HC RX IP 258 OP 636: Performed by: NURSE ANESTHETIST, CERTIFIED REGISTERED

## 2021-02-19 PROCEDURE — 250N000009 HC RX 250: Performed by: STUDENT IN AN ORGANIZED HEALTH CARE EDUCATION/TRAINING PROGRAM

## 2021-02-19 PROCEDURE — 80048 BASIC METABOLIC PNL TOTAL CA: CPT | Performed by: STUDENT IN AN ORGANIZED HEALTH CARE EDUCATION/TRAINING PROGRAM

## 2021-02-19 PROCEDURE — 250N000013 HC RX MED GY IP 250 OP 250 PS 637: Performed by: NURSE PRACTITIONER

## 2021-02-19 PROCEDURE — 88108 CYTOPATH CONCENTRATE TECH: CPT | Mod: TC | Performed by: INTERNAL MEDICINE

## 2021-02-19 PROCEDURE — 88312 SPECIAL STAINS GROUP 1: CPT | Mod: 26 | Performed by: PATHOLOGY

## 2021-02-19 PROCEDURE — 94003 VENT MGMT INPAT SUBQ DAY: CPT

## 2021-02-19 PROCEDURE — 97110 THERAPEUTIC EXERCISES: CPT | Mod: GP

## 2021-02-19 PROCEDURE — 99233 SBSQ HOSP IP/OBS HIGH 50: CPT | Performed by: INTERNAL MEDICINE

## 2021-02-19 PROCEDURE — 360N000083 HC SURGERY LEVEL 3 W/ FLUORO, PER MIN: Performed by: INTERNAL MEDICINE

## 2021-02-19 PROCEDURE — 200N000002 HC R&B ICU UMMC

## 2021-02-19 PROCEDURE — 88108 CYTOPATH CONCENTRATE TECH: CPT | Mod: 26 | Performed by: PATHOLOGY

## 2021-02-19 PROCEDURE — 85610 PROTHROMBIN TIME: CPT | Performed by: STUDENT IN AN ORGANIZED HEALTH CARE EDUCATION/TRAINING PROGRAM

## 2021-02-19 PROCEDURE — 87015 SPECIMEN INFECT AGNT CONCNTJ: CPT | Performed by: INTERNAL MEDICINE

## 2021-02-19 PROCEDURE — 272N000001 HC OR GENERAL SUPPLY STERILE: Performed by: INTERNAL MEDICINE

## 2021-02-19 PROCEDURE — 272N000078 HC NUTRITION PRODUCT INTERMEDIATE LITER

## 2021-02-19 PROCEDURE — 87186 SC STD MICRODIL/AGAR DIL: CPT | Performed by: INTERNAL MEDICINE

## 2021-02-19 PROCEDURE — 85027 COMPLETE CBC AUTOMATED: CPT | Performed by: STUDENT IN AN ORGANIZED HEALTH CARE EDUCATION/TRAINING PROGRAM

## 2021-02-19 PROCEDURE — 87077 CULTURE AEROBIC IDENTIFY: CPT | Performed by: INTERNAL MEDICINE

## 2021-02-19 PROCEDURE — 250N000009 HC RX 250: Performed by: NURSE PRACTITIONER

## 2021-02-19 PROCEDURE — 94640 AIRWAY INHALATION TREATMENT: CPT | Mod: 76

## 2021-02-19 PROCEDURE — C1726 CATH, BAL DIL, NON-VASCULAR: HCPCS | Performed by: INTERNAL MEDICINE

## 2021-02-19 PROCEDURE — 87116 MYCOBACTERIA CULTURE: CPT | Performed by: INTERNAL MEDICINE

## 2021-02-19 PROCEDURE — 999N000127 HC STATISTIC PERIPHERAL IV START W US GUIDANCE

## 2021-02-19 PROCEDURE — 97530 THERAPEUTIC ACTIVITIES: CPT | Mod: GP

## 2021-02-19 RX ORDER — SODIUM CHLORIDE, SODIUM LACTATE, POTASSIUM CHLORIDE, CALCIUM CHLORIDE 600; 310; 30; 20 MG/100ML; MG/100ML; MG/100ML; MG/100ML
INJECTION, SOLUTION INTRAVENOUS CONTINUOUS PRN
Status: DISCONTINUED | OUTPATIENT
Start: 2021-02-19 | End: 2021-02-19

## 2021-02-19 RX ORDER — HEPARIN SODIUM 1000 [USP'U]/ML
500 INJECTION, SOLUTION INTRAVENOUS; SUBCUTANEOUS CONTINUOUS
Status: DISCONTINUED | OUTPATIENT
Start: 2021-02-19 | End: 2021-02-19

## 2021-02-19 RX ORDER — HEPARIN SODIUM 1000 [USP'U]/ML
500 INJECTION, SOLUTION INTRAVENOUS; SUBCUTANEOUS
Status: COMPLETED | OUTPATIENT
Start: 2021-02-19 | End: 2021-02-19

## 2021-02-19 RX ORDER — PROPOFOL 10 MG/ML
INJECTION, EMULSION INTRAVENOUS PRN
Status: DISCONTINUED | OUTPATIENT
Start: 2021-02-19 | End: 2021-02-19

## 2021-02-19 RX ORDER — FENTANYL CITRATE 50 UG/ML
25-50 INJECTION, SOLUTION INTRAMUSCULAR; INTRAVENOUS
Status: CANCELLED | OUTPATIENT
Start: 2021-02-19

## 2021-02-19 RX ORDER — FENTANYL CITRATE 50 UG/ML
INJECTION, SOLUTION INTRAMUSCULAR; INTRAVENOUS PRN
Status: DISCONTINUED | OUTPATIENT
Start: 2021-02-19 | End: 2021-02-19

## 2021-02-19 RX ORDER — ONDANSETRON 2 MG/ML
4 INJECTION INTRAMUSCULAR; INTRAVENOUS EVERY 30 MIN PRN
Status: CANCELLED | OUTPATIENT
Start: 2021-02-19

## 2021-02-19 RX ORDER — SODIUM CHLORIDE, SODIUM LACTATE, POTASSIUM CHLORIDE, CALCIUM CHLORIDE 600; 310; 30; 20 MG/100ML; MG/100ML; MG/100ML; MG/100ML
INJECTION, SOLUTION INTRAVENOUS CONTINUOUS
Status: CANCELLED | OUTPATIENT
Start: 2021-02-19

## 2021-02-19 RX ORDER — HYDRALAZINE HYDROCHLORIDE 20 MG/ML
2.5-5 INJECTION INTRAMUSCULAR; INTRAVENOUS EVERY 10 MIN PRN
Status: CANCELLED | OUTPATIENT
Start: 2021-02-19

## 2021-02-19 RX ORDER — PROPOFOL 10 MG/ML
INJECTION, EMULSION INTRAVENOUS CONTINUOUS PRN
Status: DISCONTINUED | OUTPATIENT
Start: 2021-02-19 | End: 2021-02-19

## 2021-02-19 RX ORDER — MEPERIDINE HYDROCHLORIDE 25 MG/ML
12.5 INJECTION INTRAMUSCULAR; INTRAVENOUS; SUBCUTANEOUS EVERY 5 MIN PRN
Status: CANCELLED | OUTPATIENT
Start: 2021-02-19

## 2021-02-19 RX ORDER — ONDANSETRON 4 MG/1
4 TABLET, ORALLY DISINTEGRATING ORAL EVERY 30 MIN PRN
Status: CANCELLED | OUTPATIENT
Start: 2021-02-19

## 2021-02-19 RX ORDER — HYDROMORPHONE HYDROCHLORIDE 1 MG/ML
.3-.5 INJECTION, SOLUTION INTRAMUSCULAR; INTRAVENOUS; SUBCUTANEOUS EVERY 5 MIN PRN
Status: CANCELLED | OUTPATIENT
Start: 2021-02-19

## 2021-02-19 RX ORDER — METOPROLOL TARTRATE 1 MG/ML
1-2 INJECTION, SOLUTION INTRAVENOUS EVERY 5 MIN PRN
Status: CANCELLED | OUTPATIENT
Start: 2021-02-19

## 2021-02-19 RX ADMIN — HEPARIN SODIUM 500 UNITS: 1000 INJECTION INTRAVENOUS; SUBCUTANEOUS at 16:13

## 2021-02-19 RX ADMIN — SULFAMETHOXAZOLE AND TRIMETHOPRIM 80 MG: 200; 40 SUSPENSION ORAL at 09:45

## 2021-02-19 RX ADMIN — ACETAMINOPHEN 325 MG: 325 TABLET, FILM COATED ORAL at 13:21

## 2021-02-19 RX ADMIN — Medication 40 MG: at 09:39

## 2021-02-19 RX ADMIN — SODIUM CHLORIDE, POTASSIUM CHLORIDE, SODIUM LACTATE AND CALCIUM CHLORIDE: 600; 310; 30; 20 INJECTION, SOLUTION INTRAVENOUS at 08:36

## 2021-02-19 RX ADMIN — Medication 5 ML: at 07:54

## 2021-02-19 RX ADMIN — TACROLIMUS 2.5 MG: 5 CAPSULE ORAL at 18:51

## 2021-02-19 RX ADMIN — LOPERAMIDE HCL 2 MG: 1 SOLUTION ORAL at 07:54

## 2021-02-19 RX ADMIN — Medication 1 PACKET: at 09:37

## 2021-02-19 RX ADMIN — OLANZAPINE 10 MG: 2.5 TABLET ORAL at 20:36

## 2021-02-19 RX ADMIN — PROPOFOL 150 MCG/KG/MIN: 10 INJECTION, EMULSION INTRAVENOUS at 08:42

## 2021-02-19 RX ADMIN — METOPROLOL TARTRATE 50 MG: 25 TABLET, FILM COATED ORAL at 09:38

## 2021-02-19 RX ADMIN — ACETYLCYSTEINE 2 ML: 200 SOLUTION ORAL; RESPIRATORY (INHALATION) at 22:44

## 2021-02-19 RX ADMIN — ALBUTEROL SULFATE 2.5 MG: 2.5 SOLUTION RESPIRATORY (INHALATION) at 22:44

## 2021-02-19 RX ADMIN — ACETYLCYSTEINE 2 ML: 200 SOLUTION ORAL; RESPIRATORY (INHALATION) at 07:56

## 2021-02-19 RX ADMIN — ALBUTEROL SULFATE 2.5 MG: 2.5 SOLUTION RESPIRATORY (INHALATION) at 07:56

## 2021-02-19 RX ADMIN — INSULIN GLARGINE 10 UNITS: 100 INJECTION, SOLUTION SUBCUTANEOUS at 07:55

## 2021-02-19 RX ADMIN — Medication 1 PACKET: at 09:36

## 2021-02-19 RX ADMIN — INSULIN ASPART 1 UNITS: 100 INJECTION, SOLUTION INTRAVENOUS; SUBCUTANEOUS at 01:02

## 2021-02-19 RX ADMIN — ALBUTEROL SULFATE 2.5 MG: 2.5 SOLUTION RESPIRATORY (INHALATION) at 01:10

## 2021-02-19 RX ADMIN — SUGAMMADEX 200 MG: 100 INJECTION, SOLUTION INTRAVENOUS at 09:00

## 2021-02-19 RX ADMIN — SODIUM CHLORIDE 250 ML: 9 INJECTION, SOLUTION INTRAVENOUS at 16:16

## 2021-02-19 RX ADMIN — PROPOFOL 100 MG: 10 INJECTION, EMULSION INTRAVENOUS at 08:42

## 2021-02-19 RX ADMIN — POSACONAZOLE 300 MG: 40 SUSPENSION ORAL at 20:36

## 2021-02-19 RX ADMIN — BUDESONIDE 0.5 MG: 0.5 INHALANT ORAL at 22:44

## 2021-02-19 RX ADMIN — POSACONAZOLE 300 MG: 40 SUSPENSION ORAL at 09:40

## 2021-02-19 RX ADMIN — Medication 1 PACKET: at 20:44

## 2021-02-19 RX ADMIN — METOPROLOL TARTRATE 50 MG: 25 TABLET, FILM COATED ORAL at 20:36

## 2021-02-19 RX ADMIN — ALBUTEROL SULFATE 2.5 MG: 2.5 SOLUTION RESPIRATORY (INHALATION) at 14:28

## 2021-02-19 RX ADMIN — TACROLIMUS 2 MG: 5 CAPSULE ORAL at 07:54

## 2021-02-19 RX ADMIN — SODIUM CHLORIDE 300 ML: 9 INJECTION, SOLUTION INTRAVENOUS at 16:15

## 2021-02-19 RX ADMIN — PREDNISONE 2.5 MG: 2.5 TABLET ORAL at 20:36

## 2021-02-19 RX ADMIN — HEPARIN SODIUM 500 UNITS/HR: 1000 INJECTION INTRAVENOUS; SUBCUTANEOUS at 16:14

## 2021-02-19 RX ADMIN — DESMOPRESSIN ACETATE 17.6 MCG: 4 INJECTION, SOLUTION INTRAVENOUS; SUBCUTANEOUS at 05:08

## 2021-02-19 RX ADMIN — ROCURONIUM BROMIDE 40 MG: 10 INJECTION INTRAVENOUS at 08:42

## 2021-02-19 RX ADMIN — POSACONAZOLE 300 MG: 40 SUSPENSION ORAL at 13:22

## 2021-02-19 RX ADMIN — PREDNISONE 5 MG: 5 TABLET ORAL at 09:41

## 2021-02-19 RX ADMIN — ROCURONIUM BROMIDE 10 MG: 10 INJECTION INTRAVENOUS at 08:50

## 2021-02-19 RX ADMIN — LIDOCAINE HYDROCHLORIDE 0.5 ML: 10 INJECTION, SOLUTION EPIDURAL; INFILTRATION; INTRACAUDAL; PERINEURAL at 16:14

## 2021-02-19 RX ADMIN — EPOETIN ALFA-EPBX 4000 UNITS: 10000 INJECTION, SOLUTION INTRAVENOUS; SUBCUTANEOUS at 16:17

## 2021-02-19 RX ADMIN — FENTANYL CITRATE 50 MCG: 50 INJECTION, SOLUTION INTRAMUSCULAR; INTRAVENOUS at 08:50

## 2021-02-19 RX ADMIN — BUDESONIDE 0.5 MG: 0.5 INHALANT ORAL at 07:56

## 2021-02-19 ASSESSMENT — ACTIVITIES OF DAILY LIVING (ADL)
ADLS_ACUITY_SCORE: 15

## 2021-02-19 ASSESSMENT — MIFFLIN-ST. JEOR: SCORE: 1109.65

## 2021-02-19 ASSESSMENT — ENCOUNTER SYMPTOMS: DYSRHYTHMIAS: 1

## 2021-02-19 NOTE — PLAN OF CARE
ICU End of Shift Summary. See flowsheets for vital signs and detailed assessment.    Changes this shift: Good sleep overnight.  TF and heparin held for procedure today.  Tylenol x1 for PEG pain.      Plan: Bronchial dilation today, transfer to rehab/LTACH when appropriate.

## 2021-02-19 NOTE — PLAN OF CARE
ICU End of Shift Summary. See flowsheets for vital signs and detailed assessment.    Changes this shift: Patient A&O. Small amount of pain with new PEG placement, tylenol given.  VSS.  TF increased.  Patient worked with PT and OT. On trach dome all day 35% @40 LPM. Helped patient with her bath before getting back to bed after up in chair for several hours.    Plan: Bronch dilation tomorrow in the OR.  Hold AM dose of heparin, rest patient on full vent settings overnight.

## 2021-02-19 NOTE — PLAN OF CARE
OT 4C Pt with busy am, worked with PT, to OR for bronchoscopy and then dialysis.  Declines OT this pm.

## 2021-02-19 NOTE — PROCEDURES
INTERVENTIONAL PULMONOLOGY       Procedure(s):    A flexible bronchoscopy  Airway exam  Balloon bronchoplasty without stent placement (1 sites)   Bronchial washing (1 sites)  Therapeutic suctioning (1 sites)    Indication:  Hx of bilateral lung transplant c/b right mainstem stenosis    Attending of Record:  Alana Lin MD    Interventional Pulmonary Fellow   Juana Baugh MD     Trainees Present:   None     Medications:    General Anesthesia - See anesthesia flowsheet for details    Sedation Time:   Per Anesthesia Care Provider    Time Out:  Performed    The patient's medical record has been reviewed.  The indication for the procedure was reviewed.  The necessary history and physical examination was performed and reviewed.  The risks, benefits and alternatives of the procedure were discussed with the the patient in detail and she had the opportunity to ask questions. All questions were answered to the best of my ability.  Verbal and written informed consent was obtained.  The proposed procedure and the patient's identification were verified prior to the procedure by the physician and the surgical team.    After clinical evaluation and reviewing the indication, risks, alternatives and benefits of the procedure the patient was deemed to be in satisfactory condition to undergo the procedure.      A Tuberculosis risk assessment was performed:  The patient has no known RISK of Tuberculosis    The procedure was performed in a negative airflow room: The patient could not be moved to a negative airflow room because of needed OR for the procedure    Maneuvers / Procedure:    Airway Examination: A complete airway examination was performed from the distal trachea to the subsegmental level in each lobe of both lungs done through preexisting trach.  Pertinent findings include: Moderate right mainstem stenosis seen at level of anastomosis. Therapeutic scope could fit through and into RUL and BI prior to dilation. There is  a size mismatch on right side between native and transplant lung leading to large area of scar tissue medially at level of suture line. Moderate amount of mucous plugging in RLL, this was washed and sent for analysis.      Airway dilation: Airway dilation#1: The 8-9-10mm Elation Ballooon was used to dilate the Right mainstem  Airway up to 10mm.     Therapeutic suctioning: 15-20min of operative time was spent clearing out the airway of debris, blood and mucous.    Any disposable equipment was visually inspected and deemed to be intact immediately post procedure.      Relevant Pictures  Right mainstem anastomosis prior to intervention      Right mainstem anastomosis after dilation      Left mainstem anastomosis      KONRAD and LLL      Recommendations:     --Successful dilation of right mainstem anastomosis  --Repeat bronchoscopy in 6 weeks.  --Trach sutures removed.    Juana Baugh  Interventional Pulmonary Fellow  [phone]

## 2021-02-19 NOTE — PROGRESS NOTES
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  Patient was admitted to OSH on  for acute hypoxemic respiratory failure and new lung opacities. Intubated  and transferred to [facility] for ongoing management.  Extubated  but reintubated - for progressive hypoxemia.  Rapid decompensation /3 with ARDS presentation requiring reintubation, prone positioning, and inhaled epoprostenol, suspected d/t worsening PJP. S/p Trach , and PEG-J (). Now with improved oxygenation and tolerating TD throughout the day. Planning for bronch with right mainstem dilation with IP this week. Tentative discharge to ARU early next week.     Today's recommendations:  - Continue PSMV trials   - Agree with right mainstem dilation by IP today  - Continue enteral Posaconazole 300 mg TID (increased ), repeat level  (ordered), recommend monitoring LFTs and QTc  - Next tacrolimus level  (ordered)  - Continue VGCV for CMV ppx through  (one week after steroid burst completion)  - Repeat EBV DNA  (ordered)  - Agree with discharge planning to rehab     Acute hypoxemic respiratory failure:   Right post-obstructive Stenotrophomonas and Eikenella pneumonia:  PJP pneumonia:  Septic shock:   Airway stenosis with distal plugging:  ARDS: Presented to OSH ED with increased SOB and hypoxia, initially requiring 4L NC but continued to decline and subsequently intubated .  Hemodynamic instability after  intubation requiring pressors, transferred to [facility].  Afebrile but with leukocytosis.  COVID and respiratory panel negative.  PTA bronch (12/23/20) with moderate airway obstruction and RML/RLL mucous plugging, cultures with Stenotrophomonas and Eikenella (IV ceftazidime 1/13-1/19).  OSH CT chest with new multifocal GGO bilaterally and increased left loculated pleural effusion.  Repeat bronch (1/24) with RUL BAL with thick copious secretions to RML/RLL, PJP PCR positive.  Extubated on 1/26, but reintubated from 1/27-1/29 for progressive hypoxemia.  Remained on either HFNC % FiO2 or BiPAP % FiO2, then eventually decompensated on 2/3 and again reintubated.  Workup revealing for ARDS, proning protocol initiated (2/3) with inhaled epoprostenol, pressors initiated.  Significant rise in leukocytosis (2/3) concerning for worsening PJP vs secondary infection.  CT chest (2/3) with progressive consolidation and possible superimposed infection findings, stable LLL chronic empyema. CT chest 2/10 with improvement in aeration bilaterally. Bronch 2/11 with known airway stenosis at right anastomosis, with moderate airway obstruction. S/p Trach 2/12. Completed burst steroids with taper through 2/17. Oxygenation improving and now tolerating TD throughout the day.  - Ventilator management per MICU, trach dome as able  - ABX per MICU: none currently. S/p 21 day course Bactrim (1/24-2/15) for PJP, which also covered the Eikenella and Stenothrophomonas on BAL from 12/23/2021. S/p empiric Zosyn (2/3-2/12)  - Chest physiotherapy QID and nebs: Albuterol QID and Mucomyst BID, and Pulmicort BID  - Agree with PMSV trials, tolerating well     Aspergillus empyema (left-sided): First noted 10/8/19, negative in November and again on admission.  CT scan on 7/17/20 with increased mass-like density, likely pleural-based, in LLL area s/p needle aspiration (8/13/20) with Aspergillus fumigatus on cultures s/p intrapleural amphotericin bead  placement (11/20).  Follows with ID as OP.  LFTs stable on admission (ALP chronically mildly elevated).  CT chest with increased loculated left pleural effusion s/p thoracentesis (1/25), exudative with 87% neutrophils, very high LDL (6308), cytology normal, AFB pleural cultures NGTD-stain negative.  - Posaconazole (PTA plan for indefinite course). Changed to IV posaconazole 2/9/2021 d/t subtherapeutic level despite increase. Repeat level 2/16 still subtherapeutic (0.3), discussed with pharmacy, transitioned back to enteral and increased to TID dosing. 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 DSA (1/25) not detected.  -  IP to manage right bronchial stenosis, right mainstem dilation in the OR by IP 2/19, bronchial washing was obtained, follow on the cultures      Immunosuppression: On 2 drug IST d/t recurrent infections  - Tacrolimus 2 mg qAM/ 2.5 mg qPM.  Goal level 8-10. Level 2/18 therapeutic at 8.8. no dose adjustment. Next level 2/21 (ordered)  - Chronic prednisone 5 mg qAM / 2.5 mg qPM (transitioned from burst steroids 2/18)     Prophylaxis:   - Bactrim PPx dose as above     H/o CMV viremia: CMV D+/R+.  CMV <137 (1/25).  - Continue VGCV for CMV ppx through 2/24 (one week after steroid burst completion)     EBV viremia: Level 12/9/20 mildly elevated to 10K (log 4) from prior 3K (3.5 log) on 9/9/20, repeat (1/25/21) decreased to 5619 copies (log of 3.8).  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 Chronic iHD M/Th via AVF with partial graft.  Not able to tolerate iHD 2/3 d/t hypotension, line placed and transitioned to CRRT 2/4. Now back to iHD   - Monitoring of tacrolimus as above  - Dialysis management per nephrology     Atrial fibrillation w/ RVR: H/o paroxysmal AF,  "last 10/2019.  Recurrence 1/30 with RVR (-140s), likely in the setting of acute pulmonary illness.  - Management per MICU: Metoprolol 50 mg BID      Oropharyngeal candidiasis: White tongue plaque noted 2/1.  - Nystatin QID (2/1)      We appreciate the excellent care provided by the MICU team. Recommendations communicated via in person rounding and this note. Will continue to follow along closely, please do not hesitate to call with any questions or concerns.      Patient was seen and discussed with Dr. Fermin Braun MD   Pulmonary and Critical Care Fellow   Pager [phone]       Subjective & Interval History:     We saw the patient at the bedside after the bronchoscopy this AM, she is doing well, she is bit sleepy with the medications she received, she is interactive though, she denied pain or SOB, she had no cough, she denied abdominal pain or NV.    Review of Systems:     C: No fever  INTEGUMENTARY/SKIN: No rash or obvious new lesions  ENT/MOUTH: No sore throat  RESP: See interval history  CV: No chest pain, no palpitations, no peripheral edema, no orthopnea  GI: No nausea, no vomiting, no change in stools, no reflux symptoms  : No dysuria  MUSCULOSKELETAL: No myalgias, no arthralgias  NEURO: No headache, no numbness or tingling  PSYCHIATRIC: Mood stable    Physical Exam:     Vital signs:  Temp: 96.7  F (35.9  C) Temp src: Axillary BP: 97/70 Pulse: 108   Resp: 21 SpO2: 99 % O2 Device: Mechanical Ventilator Oxygen Delivery: 40 LPM Height: 160 cm (5' 2.99\") Weight: 56.1 kg (123 lb 9.6 oz)  I/O:       Intake/Output Summary (Last 24 hours) at 2/19/2021 1338  Last data filed at 2/19/2021 1300  Gross per 24 hour   Intake 1500 ml   Output --   Net 1500 ml       Constitutional: sitting in chair, in no apparent distress.   HEENT: Eyes with pink conjunctivae, anicteric. Oral mucosa moist without lesions.  PULM: Good air flow bilaterally. Scattered wheezing in the right base, no rhonchi   CV: Normal S1 " and S2. RRR. No murmur, gallop, or rub. No peripheral edema.   ABD: Soft, nontender, nondistended. Peg-J  MSK: Moves all extremities. No apparent muscle wasting.   NEURO: Alert and oriented, nonverbal communication with mouthing words and gestures  SKIN: Warm, dry. No rash on limited exam.   PSYCH: Mood stable, calm.     Lines, Drains, and Devices:  Peripheral IV 02/16/21 Right;Posterior Lower forearm (Active)   Site Assessment [facility] 02/18/21 0800   Line Status Saline locked;Checked every 1-2 hour 02/18/21 0800   Phlebitis Scale 0-->no symptoms 02/18/21 0800   Infiltration Scale 0 02/18/21 0800   Infiltration Site Treatment Method  None 02/18/21 0800   Number of days: 2       CVC Double Lumen 10/25/19 Right Internal jugular (Active)   Number of days: 482       Hemodialysis Vascular Access Arteriovenous fistula Left Arm (Active)   Site Assessment [facility] 02/18/21 0800   Cannulation Needle Size 15 02/17/21 1015   Dressing Intervention New dressing 02/17/21 1348   Dressing Status Clean, dry, intact 02/18/21 0800   Verification by x-ray Not applicable 02/17/21 1348   Hand Off Report Yes 02/17/21 1348   Number of days: 24     Data:     LABS    CMP:   Recent Labs   Lab 02/19/21  0458 02/18/21  0430 02/16/21  0612 02/15/21  0418 02/14/21  0336 02/13/21  0323    134 134 136 136 134   POTASSIUM 4.0 3.6 3.6 3.9 3.8 4.2   CHLORIDE 100 100 99 100 100 100   CO2 29 27 28 27 29 26   ANIONGAP 5 8 7 8 7 8   * 158* 154* 155* 149* 142*   BUN 46* 29 34* 52* 32* 52*   CR 3.14* 2.19* 2.27* 2.75* 1.84* 2.59*   GFRESTIMATED 16* 24* 23* 18* 30* 20*   GFRESTBLACK 18* 28* 27* 21* 34* 23*   CHELSEY 8.8 8.4* 8.9 8.6 8.6 8.7   MAG  --   --   --  1.9 1.8 2.4*   PHOS  --   --   --  4.5 3.6 5.3*     CBC:   Recent Labs   Lab 02/19/21  0458 02/18/21  0430 02/16/21  0612 02/15/21  1026 02/15/21  0418   WBC 3.0* 3.2* 4.0  --  3.7*   RBC 2.96* 2.87* 2.87*  --  2.25*   HGB 9.0* 8.8* 8.7* 8.4* 6.7*   HCT 29.3* 28.3* 27.4*  --  21.9*   MCV 99 99 96  --   97   MCH 30.4 30.7 30.3  --  29.8   MCHC 30.7* 31.1* 31.8  --  30.6*   RDW 21.4* 21.3* 21.1*  --  21.8*    209 145*  --  116*       INR:   Recent Labs   Lab 02/19/21 0458   INR 0.99       Glucose:   Recent Labs   Lab 02/19/21  0507 02/19/21  0458 02/19/21  0100 02/18/21  2139 02/18/21  1540 02/18/21  1248 02/18/21  0905 02/18/21  0430 02/18/21  0430 02/16/21  0612 02/16/21  0612 02/15/21  0418 02/15/21  0418 02/14/21  0336 02/14/21  0336 02/13/21  0323 02/13/21  0323   GLC  --  102*  --   --   --   --   --   --  158*  --  154*  --  155*  --  149*  --  142*   *  --  146* 113* 98 130* 145*   < >  --    < >  --    < >  --    < >  --    < >  --     < > = values in this interval not displayed.       Blood Gas: No lab results found in last 7 days.    Culture Data No results for input(s): CULT in the last 168 hours.    Virology Data:   Lab Results   Component Value Date    FLUAH1 Not Detected 01/24/2021    FLUAH3 Not Detected 01/24/2021    ZX1423 Not Detected 01/24/2021    IFLUB Not Detected 01/24/2021    RSVA Not Detected 01/24/2021    RSVB Not Detected 01/24/2021    PIV1 Not Detected 01/24/2021    PIV2 Not Detected 01/24/2021    PIV3 Not Detected 01/24/2021    HMPV Not Detected 01/24/2021    HRVS Negative 01/24/2021    ADVBE Negative 01/24/2021    ADVC Negative 01/24/2021    ADVC Negative 12/23/2020    ADVC Negative 10/07/2019       Historical CMV results (last 3 of prior testing):  Lab Results   Component Value Date    CMVQNT <137 (A) 01/25/2021    CMVQNT 238 (A) 01/24/2021    CMVQNT 675 (A) 12/23/2020     Lab Results   Component Value Date    CMVLOG <2.1 01/25/2021    CMVLOG 2.4 (H) 01/24/2021    CMVLOG 2.8 (H) 12/23/2020       Urine Studies    Recent Labs   Lab Test 01/24/21  1729 10/21/19  2240   URINEPH 5.0 5.0   NITRITE Negative Negative   LEUKEST Moderate* Large*   WBCU 34* 115*       Most Recent [place] Pulmonary Function Testing (FVC/FEV1 only)  FVC-Pre   Date Value Ref Range Status   12/09/2020  1.12 L    09/09/2020 1.56 L    03/09/2020 1.57 L    02/19/2020 1.78 L      FVC-%Pred-Pre   Date Value Ref Range Status   12/09/2020 34 %    09/09/2020 47 %    03/09/2020 48 %    02/19/2020 54 %      FEV1-Pre   Date Value Ref Range Status   12/09/2020 0.98 L    09/09/2020 1.10 L    03/09/2020 0.96 L    02/19/2020 0.99 L      FEV1-%Pred-Pre   Date Value Ref Range Status   12/09/2020 37 %    09/09/2020 42 %    03/09/2020 37 %    02/19/2020 38 %        IMAGING    Recent Results (from the past 48 hour(s))   IR Gastro Jejunostomy Tube Placement    Narrative    PROCEDURES 264 and 21:  1. Gastrojejunostomy tube placement under fluoroscopic guidance.    Clinical History: 60-year-old female with ILD status post lung  transplant requiring feeding tube for nutrition.    Comparisons: CT abdomen pelvis 2/9/2021.    Staff Radiologist: Shree Ayers MD    Fellow(s): Meet Rich M.D.    Medications: The patient was placed on continuous monitoring.  Intravenous sedation was administered. Patient received 50 mg of  fentanyl and 1 mg of Versed. Vital signs and sedation monitored by  nursing staff under Interventional Radiologist's supervision. The  patient remained stable throughout the procedure.    Sedation time: 25 minutes face-to-face    Fluoroscopy time: 12.1 minutes.    Dose: 72.3 mGy    PROCEDURE: The patient understood the limitations, alternatives, and  risks of the procedure and requested the procedure be performed. Both  written and oral consent were obtained.    Pre-procedural ultrasound performed to delineate the liver margins.  Indwelling nasojejunal tube was pulled back into the stomach under  fluoroscopic guidance.    The left upper quadrant was prepped and draped in the usual sterile  fashion. 1% lidocaine without epinephrine was used for local  anesthesia.     Glucagon administered. Stomach inflated with air through the  nasogastric tube. Under fluoroscopic guidance, gastropexy was made  with 2 [employer]  Saf-T-Pexy* T-fasteners.    Needle gastrostomy made under fluoroscopic guidance with the 18 gauge  Safety Introducer needle. Needle removed over guidewire. Proximal  jejunum catheterized with the 5 [place] RAFAEL 1 catheter over guidewire  under fluoroscopic guidance. The guidewire was exchanged for a stiff  angled Glidewire. Catheter exchanged over Glidewire for the 22 [place]an  introducer dilator/peel-away sheath. Dilator exchanged over guidewire  for the 18 [place] 45 cm [employer] JAYME Transgastric-Jejunal  feeding tube through the peel-away sheath. Guidewire and peel-away  sheath removed. Tube balloon inflated and tube secured. Adequate  placement of gastric and jejunal ports documented with contrast.     Fluoroscopic image documenting tube placement was saved in the  patient's record.     Ports flushed with saline. Sterile dressing applied. Gastric port  placed to gravity drainage. Nasogastric tube removed. No immediate  complication.       Impression    IMPRESSION:   1. 18 Bur[place]an 45 cm [employer] JAYME Transgastric-Jejunal  gastrojejunostomy tube placed under fluoroscopic guidance. Gastric  port to gravity drainage.    PLAN:  Bedrest for 2 hours.    Procedure performed by Dr. Rich under my supervision.  I, Dr. Shree Ayers, was present for the entire procedure.    I have personally reviewed the examination and initial interpretation  and I agree with the findings.    SHREE AYERS MD   XR Chest Port 1 View    Narrative    EXAM: XR CHEST PORT 1 VW  2/18/2021 5:50 AM     HISTORY:  S/p Trach and acute resp failure. H/o Álvaro sltx. PJP pna.       COMPARISON:  2/12/2021    FINDINGS:   AP portable view of the chest. Postoperative changes of bilateral lung  transplantation with intact median sternotomy wires and mediastinal  surgical clips. Surgical clips project over the right axilla.  Tracheostomy tube tip projects in the mid thoracic trachea. Low lung  volumes. No appreciable pneumothorax. Suspected  small right pleural  effusion. Midline trachea. Stable enlargement of the cardiac  silhouette.      Impression    IMPRESSION:   1. Interval removal of a right IJ central venous catheter and enteric  tube.  2. No definite change in the diffuse mixed interstitial and airspace  opacities with suspected right pleural effusion.    I have personally reviewed the examination and initial interpretation  and I agree with the findings.    MEHNAZ CHAPMAN MD

## 2021-02-19 NOTE — PROGRESS NOTES
Nephrology Progress Note  02/19/2021         Kecia Blue is a 60 year old female with PMHx most significant for ILD with antisynthetase sydrome s/p BSLT 03/2018 (CMV D+/R+, EBV D+/R+) postoperatively complicated by Left mainstem bronchial stenosis s/p several dilations, left sided aspergillus empyema (10/2019), and CMV viremia who was intubated for HRF at OSH and transferred to [facility] for continued management. Nephrology consutled for dialysis needs.     Interval History :   Mrs Blue had pulm dilation this am, planned for HD run on MWF schedule, likely next run on 2/22 but will monitor chemistries and I/O's.  Planning for 3L but may be getting closer to euvolemic, will monitor over the weekend but likely next run on Mon 2/22.  Nearing discharge to TCU vs Rehab depending on pulm status.       Assessment & Recommendations:   ESRD-Runs at [facility] through [facility] group.  Has atypical HD schedule of Monday and Thursday, has AVF with partial graft which has been challenging to access per RN's.  Needed CRRT 2/5-2/8 due to hemodynamic instability but otherwise has run iHD.  Working on getting to stable 3x/week schedule, may be progressing to trach for resp failure.                   -HD today, have run every other day.                 -Has L arm AVF/graft, somewhat challenging access.                -Removed temp HD line on 2/9.        Volume status-Has mild abdominal edema but improving, will try for 3L again today but would not be surprised if we need to back off to 2L, will try to get standing wt if ambulatory this afternoon.       Electrolytes/pH-K 4.0, bicarb 29.      Ca/phos/pth-Ca 8.8, Mg and Phos mildly up last check.      Anemia-Hgb 9.0 after 1u PRBC's this am.  Checked iron stores and sats 36% on 2/3, starting EPO 4k with runs.       Nutrition-Nutren TF.       Seen and discussed with Dr Tan     Recommendations were communicated to primary team via verbal communication.     Hardy Tran,  "[facility] CNS  Clinical Nurse Specialist  [phone]    Review of Systems:   I reviewed the following systems:  Gen: No fevers or chills  CV: No CP at rest  Resp: No SOB at rest  GI: No N/V    Physical Exam:   I/O last 3 completed shifts:  In: 1405 [I.V.:55; NG/GT:435]  Out: -    /67   Pulse 109   Temp 98  F (36.7  C) (Oral)   Resp 20   Ht 1.6 m (5' 2.99\")   Wt 56.1 kg (123 lb 9.6 oz)   LMP 06/07/2014 (Exact Date)   SpO2 94%   BMI 21.90 kg/m       GENERAL APPEARANCE: Vent via trached.   EYES: no scleral icterus  Endo: no moon facies  Pulmonary: Intubated, proned, equal expansion.    CV: regular rhythm, normal rate - Edema present  GI: soft, non distended  MS: no evidence of inflammation in joints, no muscle tenderness  :  Basilio present  SKIN: warm, dry, +1 edema.    NEURO: No focal deficits.     Labs:   All labs reviewed by me  Electrolytes/Renal -   Recent Labs   Lab Test 02/19/21 0458 02/18/21 0430 02/16/21  0612 02/15/21  0418 02/14/21  0336 02/13/21  0323    134 134 136 136 134   POTASSIUM 4.0 3.6 3.6 3.9 3.8 4.2   CHLORIDE 100 100 99 100 100 100   CO2 29 27 28 27 29 26   BUN 46* 29 34* 52* 32* 52*   CR 3.14* 2.19* 2.27* 2.75* 1.84* 2.59*   * 158* 154* 155* 149* 142*   CHELSEY 8.8 8.4* 8.9 8.6 8.6 8.7   MAG  --   --   --  1.9 1.8 2.4*   PHOS  --   --   --  4.5 3.6 5.3*       CBC -   Recent Labs   Lab Test 02/19/21 0458 02/18/21  0430 02/16/21  0612   WBC 3.0* 3.2* 4.0   HGB 9.0* 8.8* 8.7*    209 145*       LFTs -   Recent Labs   Lab Test 02/09/21  1615 02/09/21  0403 02/08/21  0450   ALKPHOS 218* 182* 178*   BILITOTAL 0.4 0.3 0.4   ALT 32 28 29   AST 6 <3 4   PROTTOTAL 6.1* 5.6* 6.1*   ALBUMIN 2.0* 1.8* 2.0*       Iron Panel -   Recent Labs   Lab Test 02/03/21  0415 12/13/18  1033 08/01/18  0921   IRON 51 16* 93   IRONSAT 36 7* 37   YOLA  --  302* 571*           Current Medications:    sodium chloride 0.9%  250 mL Intravenous Once in dialysis     sodium chloride 0.9%  300 mL " Hemodialysis Machine Once     acetylcysteine  2 mL Nebulization BID     albuterol  2.5 mg Nebulization Q6H     B and C vitamin Complex with folic acid  5 mL Oral or Feeding Tube Daily     budesonide  0.5 mg Nebulization BID     epoetin alisha-epbx (RETACRIT) inj ESRD  4,000 Units Intravenous Once in dialysis     fiber modular (NUTRISOURCE FIBER)  1 packet Per Feeding Tube Daily     gelatin absorbable  1 each Topical During Hemodialysis (from stock)     heparin (porcine)  500 Units Hemodialysis Machine Once in dialysis     [Held by provider] heparin ANTICOAGULANT  5,000 Units Subcutaneous Q8H     insulin aspart  1-12 Units Subcutaneous Q4H     insulin glargine  10 Units Subcutaneous QAM AC     metoprolol tartrate  50 mg Oral or Feeding Tube BID     OLANZapine  10 mg Oral or Feeding Tube Q24H     pantoprazole  40 mg Oral QAM AC     posaconazole  300 mg Per G Tube TID     predniSONE  2.5 mg Oral or Feeding Tube QPM     predniSONE  5 mg Oral or Feeding Tube QAM     protein modular  1 packet Per Feeding Tube BID     sodium chloride  3 mL Nebulization BID     sulfamethoxazole-trimethoprim  10 mL Oral or Feeding Tube Once per day on Mon Wed Fri     tacrolimus  2 mg Oral or Feeding Tube Q24H     tacrolimus  2.5 mg Oral or Feeding Tube Q24H     valGANciclovir  450 mg Oral Once per day on Mon Thu       dextrose Stopped (02/16/21 9714)     heparin (porcine)       sodium chloride 10 mL/hr at 02/08/21 0400     - MEDICATION INSTRUCTIONS -

## 2021-02-19 NOTE — ANESTHESIA PREPROCEDURE EVALUATION
Anesthesia Pre-Procedure Evaluation    Patient: Kecia Blue   MRN: [id number] : 1960        Preoperative Diagnosis: Bronchial stenosis, right [J98.09]   Procedure : Procedure(s):  BRONCHOSCOPY, flexible, airway dilation, possible stent placement     Past Medical History:   Diagnosis Date     Abdominal pain 10/6/2019     Acute on chronic respiratory failure with hypoxia (H) 2018     Antisynthetase syndrome (H)      Chronic cough      Chronic infection     recent C diff       Dehydration 2018     Dehydration 2018     Dermatomyositis (H)      Dysplasia of cervix, low grade (ESTRADA 1)      ILD (interstitial lung disease) (H)      Nausea and vomiting 2018     Osteopenia      PONV (postoperative nausea and vomiting)      Pulmonary hypertension (H)      Raynaud's disease      Seronegative rheumatoid arthritis (H)       Past Surgical History:   Procedure Laterality Date     BRONCHOSCOPY (RIGID OR FLEXIBLE), DIAGNOSTIC N/A 4/10/2018    Procedure: COMBINED BRONCHOSCOPY (RIGID OR FLEXIBLE), LAVAGE;;  Surgeon: Mariposa Donohue MD;  Location: [facility]     BRONCHOSCOPY (RIGID OR FLEXIBLE), DIAGNOSTIC N/A 2020    Procedure: BRONCHOSCOPY, WITH BRONCHOALVEOLAR LAVAGE;  Surgeon: Uri Bass MD;  Location: [facility]     BRONCHOSCOPY (RIGID OR FLEXIBLE), DILATE BRONCHUS / TRACHEA N/A 10/11/2018    Procedure: BRONCHOSCOPY (RIGID OR FLEXIBLE), DILATE BRONCHUS / TRACHEA;  Flexible And Rigid Bronchoscopy and Dilation;  Surgeon: Wilber Lin MD;  [facility]     BRONCHOSCOPY FLEXIBLE N/A 3/13/2018    Procedure: BRONCHOSCOPY FLEXIBLE;  Flexible Bronchoscopy ;  Surgeon: Gissell Sanchez MD;  Location: [facility]     BRONCHOSCOPY FLEXIBLE N/A 2018    Procedure: BRONCHOSCOPY FLEXIBLE;;  Surgeon: Wilber Lin MD;  Location: [facility]     BRONCHOSCOPY FLEXIBLE AND RIGID N/A 9/10/2018    Procedure: BRONCHOSCOPY FLEXIBLE AND RIGID;  Flexible and Rigid Bronchoscopy with Balloon  Dilation, tissue debulking with cryo, and Right mainstem bronchus stent placement;  Surgeon: Wilber Lin MD;  [facility]     BRONCHOSCOPY RIGID N/A 6/6/2018    Procedure: BRONCHOSCOPY RIGID;;  Surgeon: Lopez Macias MD;  Location: [facility]     BRONCHOSCOPY, DILATE BRONCHUS, STENT BRONCHUS, COMBINED N/A 6/11/2018    Procedure: COMBINED BRONCHOSCOPY, DILATE BRONCHUS, STENT BRONCHUS;  Flexible Bronchoscopy, Balloon Dilation, Bronchial Washings;  Surgeon: Wilber Lin MD;  [facility]     BRONCHOSCOPY, DILATE BRONCHUS, STENT BRONCHUS, COMBINED Right 7/10/2018    Procedure: COMBINED BRONCHOSCOPY, DILATE BRONCHUS, STENT BRONCHUS;  Flexible Bronchoscopy, Balloon Dilation, Bronchial Washings  ;  Surgeon: Wilber Lin MD;  [facility]     BRONCHOSCOPY, DILATE BRONCHUS, STENT BRONCHUS, COMBINED N/A 8/2/2018    Procedure: COMBINED BRONCHOSCOPY, DILATE BRONCHUS, STENT BRONCHUS;  Flexible Bronchoscopy, Bronchial Washings, Balloon Dilation;  Surgeon: Wilber Lin MD;  [facility]     BRONCHOSCOPY, DILATE BRONCHUS, STENT BRONCHUS, COMBINED N/A 8/20/2018    Procedure: COMBINED BRONCHOSCOPY, DILATE BRONCHUS, STENT BRONCHUS;  Flexible Bronchoscopy, Balloon Dilation;  Surgeon: Wilber Lin MD;  [facility]     BRONCHOSCOPY, DILATE BRONCHUS, STENT BRONCHUS, COMBINED N/A 10/29/2018    Procedure: Flexible Bronchoscopy, Balloon Dilation, Stent Revision, Airway Examination And Therapeutic Suctioning, Cyro Tumor Debulking;  Surgeon: Wilber Lin MD;  [facility]     BRONCHOSCOPY, DILATE BRONCHUS, STENT BRONCHUS, COMBINED N/A 11/20/2018    Procedure: Rigid Bronchoscopy, Stent Removal and dilitation;  Surgeon: Wilber Lin MD;  [facility]     BRONCHOSCOPY, DILATE BRONCHUS, STENT BRONCHUS, COMBINED N/A 12/14/2018    Procedure: Flexible And Rigid Bronchoscopy, Balloon Dilation, Bronchial Washing;  Surgeon: Wilber Lin MD;   [facility]     BRONCHOSCOPY, DILATE BRONCHUS, STENT BRONCHUS, COMBINED N/A 1/17/2019    Procedure: Flexible And Rigid Bronchoscopy, Balloon Dilation.;  Surgeon: Wilber Lin MD;  [facility]     BRONCHOSCOPY, DILATE BRONCHUS, STENT BRONCHUS, COMBINED N/A 3/7/2019    Procedure: Flexible and Rigid Bronchoscopy, Bronchial Washing, Balloon Dilation;  Surgeon: Wilber Lin MD;  [facility]     BRONCHOSCOPY, DILATE BRONCHUS, STENT BRONCHUS, COMBINED N/A 6/6/2019    Procedure: Rigid and Flexible Bronchoscopy, Balloon Dilation;  Surgeon: Wilber Lin MD;  [facility]     BRONCHOSCOPY, DILATE BRONCHUS, STENT BRONCHUS, COMBINED N/A 10/11/2019    Procedure: Flexible and Rigid Bronchoscopy, Balloon Dilation, Bronchoalveolar Lagave;  Surgeon: Wilber Lin MD;  [facility]     CV RIGHT HEART CATH MEASUREMENTS RECORDED N/A 3/10/2020    Procedure: CV RIGHT HEART CATH;  Surgeon: Wai Garcia MD;  [facility]     ENT SURGERY      tonsillectomy as a child     ESOPHAGOSCOPY, GASTROSCOPY, DUODENOSCOPY (EGD), COMBINED N/A 10/29/2018    Procedure: COMBINED ESOPHAGOSCOPY, GASTROSCOPY, DUODENOSCOPY (EGD) with biopsies and polypectomy;  Surgeon: Chente Bloom MD;  [facility]     INSERT EXTRACORPORAL MEMBRANE OXYGENATOR ADULT (OUTSIDE OR) N/A 2/27/2018    Procedure: INSERT EXTRACORPORAL MEMBRANE OXYGENATOR ADULT (OUTSIDE OR);  INSERT EXTRACORPORAL MEMBRANE OXYGENATOR ADULT (OUTSIDE OR) ;  Surgeon: Toby Hernandez MD;  [facility]     IR CVC TUNNEL PLACEMENT > 5 YRS OF AGE  10/25/2019     IR GASTRO JEJUNOSTOMY TUBE PLACEMENT  2/16/2021     IR PARACENTESIS  1/8/2020     IR THORACENTESIS  9/13/2019     IR TRANSCATHETER BIOPSY  1/8/2020     no prior surgery       REMOVE EXTRACORPORAL MEMBRANE OXYGENATOR ADULT N/A 3/3/2018    Procedure: REMOVE EXTRACORPORAL MEMBRANE OXYGENATOR ADULT;  Removal of Right Femoral Venous and Right  Axillary Arterial Extracorporeal Membrane Oxygenator;  Surgeon: Toby Hernandez MD;  [facility]     TRANSPLANT LUNG RECIPIENT SINGLE X2 Bilateral 3/1/2018    Procedure: TRANSPLANT LUNG RECIPIENT SINGLE X2;  Median Sternotomy, Extracorporeal Membrane Oxygenator, bilateral sequential lung transplant;  Surgeon: Toby Hernandez MD;  [facility]      No Known Allergies   Social History     Tobacco Use     Smoking status: Never Smoker     Smokeless tobacco: Never Used   Substance Use Topics     Alcohol use: No     Alcohol/week: 1.0 standard drinks     Types: 1 Glasses of wine per week      Wt Readings from Last 1 Encounters:   02/17/21 54.7 kg (120 lb 9.5 oz)        Anesthesia Evaluation   Pt has had prior anesthetic. Type: General.    History of anesthetic complications  - PONV.      ROS/MED HX  ENT/Pulmonary: Comment: 3/1/18: lung transplant for interstitial lung dz; numerous flex, rigid, dilations since.    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     Psychiatric/Substance Use:       Infectious Disease:       Malignancy:       Other:            Physical Exam    Airway             Respiratory Devices and Support    TRACH:      Dental           Cardiovascular             Pulmonary                   OUTSIDE LABS:  CBC:   Lab Results   Component Value Date    WBC 3.0 (L) 02/19/2021    WBC 3.2 (L) 02/18/2021    HGB 9.0 (L) 02/19/2021    HGB 8.8 (L) 02/18/2021    HCT 29.3 (L)  02/19/2021    HCT 28.3 (L) 02/18/2021     02/19/2021     02/18/2021     BMP:   Lab Results   Component Value Date     02/19/2021     02/18/2021    POTASSIUM 4.0 02/19/2021    POTASSIUM 3.6 02/18/2021    CHLORIDE 100 02/19/2021    CHLORIDE 100 02/18/2021    CO2 PENDING 02/19/2021    CO2 27 02/18/2021    BUN PENDING 02/19/2021    BUN 29 02/18/2021    CR PENDING 02/19/2021    CR 2.19 (H) 02/18/2021    GLC PENDING 02/19/2021     (H) 02/18/2021     COAGS:   Lab Results   Component Value Date    PTT 28 02/12/2021    INR 0.99 02/19/2021    FIBR 358 02/11/2021     POC:   Lab Results   Component Value Date     (H) 02/19/2021    HCG Negative 06/06/2019     HEPATIC:   Lab Results   Component Value Date    ALBUMIN 2.0 (L) 02/09/2021    PROTTOTAL 6.1 (L) 02/09/2021    ALT 32 02/09/2021    AST 6 02/09/2021     (H) 11/11/2019    ALKPHOS 218 (H) 02/09/2021    BILITOTAL 0.4 02/09/2021    SALAS <10 (L) 01/24/2021     OTHER:   Lab Results   Component Value Date    PH 7.41 02/10/2021    LACT 0.5 (L) 02/09/2021    A1C 6.0 (H) 01/24/2021    CHELSEY PENDING 02/19/2021    PHOS 4.5 02/15/2021    MAG 1.9 02/15/2021    LIPASE 212 10/24/2019    AMYLASE 58 02/19/2018    TSH 1.80 08/01/2018    CRP 25.0 (H) 10/06/2019    SED 93 (H) 10/07/2019       Anesthesia Plan    ASA Status:  4      Anesthesia Type: General.     - Airway: Tracheostomy   Induction: Intravenous.   Maintenance: Balanced.        Consents    Anesthesia Plan(s) and associated risks, benefits, and realistic alternatives discussed. Questions answered and patient/representative(s) expressed understanding.     - Discussed with:  Patient         Postoperative Care       PONV prophylaxis: Dexamethasone or Solumedrol, Ondansetron (or other 5HT-3)     Comments:                Flaco Pickett MD

## 2021-02-19 NOTE — PLAN OF CARE
ICU End of Shift Summary. See flowsheets for vital signs and detailed assessment.    Changes this shift: Switched to trach dome right away so speech could work with pt, tried speaking valve for the 1st time. Patient was able to tell  she loved him.  Went to OR for bronch dilation, trach sutures removed during procedure, received 50 of fentanyl and was reversed, sleepy rest of the morning.  Worked with OT.  Currently receiving HD and tolerating well. Tylenol given X1 for PEG site pain. 1 episode of incontinence d/t drowsiness.    Plan: Continue POC and notify providers of any significant changes.  Patient will stay on trach dome overnight as long as she can tolerate.  [place] an LTACH.

## 2021-02-19 NOTE — PROGRESS NOTES
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     Overnight/Interim History:     No major events overnight. Tolerated trach dome for most of the day yesterday. Balloon dilation this AM went well.     Acute Critical Care Issues/Key Findings:     Kecia Blue is critically ill due to acute on chronic hypoxemic respiratory failure.    Acute on chronic hypoxemic respiratory failure: Thought to be secondary to PJP PNA in setting BLST and right mainstem stenosis.  Has now completed 21 days of Bactrim.  Status post trach 2/12.  Tolerating trach dome trials well.     Contiunue prophylaxis dose of Bactrim (completed 21 days of treatment)    Underwent balloon dilatation of right mainstem by IP this AM, mainstem dilated to 10mm (see IP note/pictures)    Trach dome 24 hrs a day as able, can use PS if tiring out    Continue saline and Mucomyst nebs for secretion clearance    ICU sedation, history of anxiety: improving and feeling less anxious since trach placement.  Has been off of Precedex drip since tracheostomy placement.    Continue bedtime and as needed Zyprexa    History of bilateral sequential lung transplant (2018): complicated by right mainstem bronchial stenosis requiring balloon dilatation and stent placement in the past.  Appreciate pulmonary transplant team assistance.     Right mainstem balloon dilatation this AM (see above)    Trach dome trials ongoing    Continue valganciclovir per pulm recs    Posaconazole level subtherapeutic, increase to 300 mg TID    Continue tacrolimus dose 2/2.5 mg (increased on 2/15)    On  home dose of prednisone     Nutrition: tolerating feeds via PEG-J (placed 2/16).     Tolerating feeds, at goal    ESRD:     HD schedule per renal      Disposition: ICU, working on LTACH placement which may be difficult given she would need vent weaning and HD. Best case would be that she tolerates trach dome 24hrs/day and could be discharged to TCU.     Rest per resident note from today.     The patient was seen and examined with the resident/fellow physician.  We have discussed the patient in detail and I agree with the findings, assessment, and plan as documented in their note from today. The plan was formulated in conjunction with pharmacy, ICU nurses, and respiratory therapist. I have personally reviewed today's vital signs, medications, laboratory and imaging results. I have reviewed all consults that have been ordered and are active for this patient.      Critical Care Time: 35 min. I spent this time (excluding procedures) personally providing and directing critical care services at the bedside and on the critical care unit.      Date of Service (when I saw the patient): 02/19/21    Jacinto Jones MD   of Medicine  Division of Pulmonary, Allergy, Critical Care and Sleep Medicine   [facility]  Pager: [phone]

## 2021-02-19 NOTE — PROGRESS NOTES
02/19/21 0818   General Information   Onset of Illness/Injury or Date of Surgery 01/24/21   Referring Physician Emanuel Fenton MD   Patient/Family Therapy Goal Statement (SLP) To get home to her grandkids   Pertinent History of Current Problem SLP: Pt is a 60 year old female with PMH of BSLT (2018) for ILD with antisynthetase sydrome, postoperative course c/b right mainstem bronchial stenosis s/p several dilations, left-sided Aspergillus empyema (2019) s/p amphotericin beads (11/2020), EBV viremia, CMV viremia, and ESRD on HD M/Th.  Patient was admitted to OSH on 1/22 for acute hypoxemic respiratory failure and new lung opacities. Intubated 1/24 and transferred to [facility] for ongoing management.  Extubated 1/26 but reintubated 1/27-1/29 for progressive hypoxemia.  Rapid decompensation 2/3 with ARDS presentation requiring reintubation, prone positioning, and inhaled epoprostenol, suspected d/t worsening PJP. S/p Trach 2/12, and PEG-J (2/16). Now with improved oxygenation and tolerating TD throughout the day. Planning for bronch with right mainstem dilation with IP this week. Tentative discharge to ARU early next week. Pt has been tolerating trach dome during the day, per RN, pt with minimal suctioning needs. Communication/PMSV evaluation completed per MD order.    General Observations Alert and eager   Type of Evaluation   Type of Evaluation Artificial Airway (Speaking Valve)   Tracheostomy Assessment (Speaking Valve)   Date of Tracheostomy 02/12/20   Type, Tracheostomy Tube Shiley   Tube Size, Tracheostomy 8   Cuff, Tracheostomy Tube cuffed, inflated   Participation Ability (Speaking Valve) awake/alert;attempts to communicate, mouthing words   Respiratory Status (Speaking Valve)   Oxygen Supply Trach Dome  (30%FiO2, 40LPM, SpO2 97%)   Oral/Tracheal Secretions (Speaking Valve)   Oral Secretions (Speaking Valve Assessment) minimal secretions   Tracheal Secretions (Speaking Valve Assessment) minimal secretions    Speaking Valve Trials (Speaking Valve)   Cuff Inflated at Onset of Evaluation Yes   Orders received to deflate cuff for PMSV trial Yes   Oxygen saturation before cuff deflation 97 %   Respiratory rate before cuff deflation   (WNL)   Set-up/Cuff Deflation (Speaking Valve Trial) cuff deflated, total;tolerance with no vital sign changes   Oxygen saturation after cuff deflation 97 %   Respiratory rate after cuff deflation   (WNL)   Secretions/Suction, Cuff Deflation (Speaking Valve) oral suctioning post cuff deflation;tracheal suctioning post cuff deflation   Airflow/Phonation Back pressure evident;Air flow around trach adequate with finger occlusion   Speaking Valve placed on tracheostomy tube;vital signs monitored throughout trials   Oxygen saturation with PMSV placement 94 %   Respiratory Rate with PMSV placement increased   Breath Support (Speaking Valve Trial) exhales through mouth   Voice Production (Speaking Valve Trial) voicing achieved;strained/effortful quality   Cough Production (Speaking Valve Trial) strong   Secretions During Valve Use (Speaking Valve Trial) secretions stable during valve use   Outcome of Trial (Speaking Valve) work of breathing increased;trials discontinued;cuff deflated;baseline vital signs recovered   Total amount of time with PMSV placement: <30 seconds   Recommendations (Speaking Valve Trials) speaking valve not recommended;continue speaking valve trials with SLP present;decrease trach tube size, allow upper airway airflow   General Therapy Interventions   Planned Therapy Interventions Communication   Communication Speaking valve instruction   SLP Therapy Assessment/Plan   Criteria for Skilled Therapeutic Interventions Met (SLP Eval) yes;treatment indicated   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 Pt with Shiley 8 trach, on trach dome, 30%FiO2, 40LPM, cuff inflated. SLP deflated cuff and RN provided deep suctioning. Stable SpO2 at 96%. Upon finger occlusion, pt required increased effort to pass air into upper airway to produce strangled voice. Given some passage of air, SLP placed PMSV x2. Pt tolerated valve for <30 seconds before endorsing increased WOB and fatigue. No further trials completed. Cuff left deflated, RN aware. Suspect pt would benefit from downsize to Shiley 6. Recommend PMSV with SLP only at this time. Recommend ongoing trach dome with cuff deflated as tolerated. SLP will follow daily.    Therapy Plan Review/Discharge Plan (SLP)   Therapy Plan Review (SLP) evaluation/treatment results reviewed;care plan/treatment goals reviewed;risks/benefits reviewed;current/potential barriers reviewed;participants voiced agreement with care plan;participants included;patient   SLP Discharge Planning    SLP Discharge Recommendation (DC Rec) [facility] patient will benefit from intensive, interdisciplinary therapy.  Anticipate will be able to tolerate 3 hours of therapy per day   SLP Rationale for DC Rec Aphonia 2/2 trach, NPO   SLP Brief overview of current status  Suspect pt would benefit from downsize to Shiley 6. Recommend PMSV with SLP only at this time. Recommend ongoing trach dome with cuff deflated as tolerated. SLP will follow daily.    Total Evaluation Time   Total Evaluation Time (Minutes) 16

## 2021-02-19 NOTE — PROGRESS NOTES
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 Found to have PCP pneumonia.    CHANGES and MAJOR THINGS TODAY:   - Continue trach dome   - IP right mainstem dilation today   - Discharge planning to LTACH vs TCU  - IP, planning R mainstem dilation this Friday   - Discharge planning to LTACH vs TCU   - Weekly COVID test per hospital policy, next 2/19  - Pulmonary following: appreciate recommendation   - Ordered SLP consult for PMSV    PLAN:  Neuro:  # Pain and sedation  - Precedex post tracheostomy   - Hydroxyzine prn for anxiety    # Delirium, resolved   # Encephalopathy, resolved   - Scheduled 10 mg at bedtime and PRN Zyprexa     # Insomnia  - Melatonin available prn    # Anisocoria (L>R)  Intermittently fixed and dilated pupils. CTH unremarkable. Neuro examination unremarkable.  - Opthomalogy evaluated on 2/1    Pulmonary:  # Acute Hypoxic Respiratory Failure  # Tracheostomy (2/12/21)  Suspected 2/2 PJP initially. OSH CT 1/23 showed bilateral ground glass opacities and consolidative lung infiltrates centrally distributed to the upper lobes and RLL. COVID negative x3 at outside hospital. Concern for secondary pneumonia given worsening clinical picture. 2/4 CT chest indicated worsening consolidations consistent with progression of pneumocystis with possibilty of superimposed secondary pneumonia. Previously on inhaled epoprostenol. S/p Perc tracheostomy 2/12 with interventional pulm. Mechanical ventilation AC 18/320/+8/50% -  dropped RR to 18 from 24; wean FiO2 to 40% and then PEEP to 5 after tracheostomy 2/12. Last dose of Zosyn 1/12.   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  - Positive PJP PCR on 1/28 with positive fungitel   - Continue PTA tacrolimus   - Continue VGCV for CMV ppx through 2/24   - Repeat EBV DNA 2/22   - Started home dose Prednisone 2/18, completed taper dose   - Pulmonary lung transplant team consulted and following, appreciate recs    - R mainstem dilation today 2/19   - Continue enteral Posaconazole 300 mg TID (increased 2/17), repeat level 2/23 (ordered), recommend monitoring LFTs and QTc    Cardiovascular:  # Shock, resolved  # Transient hypotension related to dialysis, sleep- Resolved  Last ECHO 10/21/20 showed EF 60-65%, normal LV and RV function.     # Afib with RVR, resolved  # ST  Intermittently going in and out of A-fib, currently ST.  - Metoprolol 50mg BID    # Hx HTN  - Hold PTA carvedilol and amlodipine    GI/Nutrition:  # Portal HTN  Liver biopsy positive for congestive hepatopathy. Previous had ascites thought to be r/t elevated right sided heart pressures. Last saw GI on 12/21/20 and patient endorsed that her ascites is no longer present. Etiology unclear.   - NTD    # Nutrition  - IR PEG-J tube placement 2/16, currently on TF  - Daily fiber via PEG-J  tube      # Constipation-resolved and now with loose stools  - Hx of constipation with bactrim, reports frequent BMs  - Change schedule bowel regimen to PRN, due  to multiple loose stools     Renal/Fluids/Electrolytes:  # ESRD on iHD twice weekly(M/Th)  # Anion gap metabolic acidosis r/t ESRD, uremia, resolved  Outpatient dry body weight 56.5 kg. CRRT 2/4 to 2/8. Last HD 2/11 for 2L off   - Nephrology following   - iHD per nephrology   - I&O  - Daily weights   - Trend BMP     # Hypomagnesemia- resolved  - Replace Mag x1    Endocrine:  # Hyperglycemia, steroid induced  - High intensity sliding scale insulin  - 10 units glargine daily    # Seronegative RA with Raynaud's   - NTD, monitor     ID:  # Sepsis in setting of PJP pneumonia, possible secondary pneumonia  Suspected related to PJP pneumonia. OSH CT 1/23 + bilateral ground glass opacities and consolidative lung infiltrates centrally distributed to the upper lobes and RLL. Covid-19 negative X3 at OSH. Procalcitonin negative. Urine strep, CMV, and RPP negative. Given acute and severe decompensation, concern for secondary pneumonia. 2/4 CT chest which displayed worsening consolidations consistent with progression of pneumocystis with the possibility of superimposed secondary pneumonia.  - PJP PCR positive, fungitell positive as well  - Last dose of Bactrim 21 days today,  switch to ppx dose  2/15; prednisone taper  - Zosyn 10-day course  completed on 2/12  - s/p thora 1/25, fluid exudative, negative w/u  - Weekly COVID test per hospital policy, next 2/19    # Chronic/Stable right aspergillus empyema   - Continue posaconazole, treatment dosing, will monitor QTc intermittently (431 on 2/11)  - Posaconazole level ordered for 2/16   -consider transitioning to oral once gj tube placed    Hematology:    # Anemia r/t renal disease, chronic stable   # Thrombocytopenia r/t critical illness  # Leukocytosis, infection/steroids  Takes Aranesp 25 mcg every four weeks, last dose 1/14, next scheduled was 2/11.   Currently receiving epoetin alisha-epox with dialysis   - Discussed with nephrology, will add replacement with HD run on 2/13   -iHD  today  - S/p 1PRBC transfusion for hgb 6.7, recheck 8.4, no sign of bleeding. Goal to minimize ICU lab draws as much as possible     Musculoskeletal:  # Generalized weakness  - PT/OT  - Increase activity, up to chair, PT following    Skin:  # Dermatomyositis antitryptase syndrome   - Noted, NTD    General Cares/Prophylaxis:    DVT Prophylaxis: Heparin subcutaneous (will hold for procedure)   GI Prophylaxis: PPI  Restraints: none  Family Communication:  updated at the bedside  Code Status: full    Lines/tubes/drains:  - trach  - HD fistula left forearm  - CVC Left IJ  - Art line - to be removed 2/14  - NJT    Disposition:  - Medical ICU     Patient seen and findings/plan discussed with medical ICU staff, Dr. Robert Fenton MD  Internal Medicine, PGY-2  [facility]   Pager: [phone]    ====================================  INTERVAL HISTORY:   No acute events overnight. Slept well. Denies  pain or any other concerns.   at the bedside.     OBJECTIVE:   1. VITAL SIGNS:   Temp:  [96.7  F (35.9  C)-98.4  F (36.9  C)] 98.4  F (36.9  C)  Pulse:  [] 94  Resp:  [18-24] 20  BP: ()/() 95/60  FiO2 (%):  [30 %-45 %] 30 %  SpO2:  [92 %-99 %] 98 %  Ventilation Mode: Trach collar  FiO2 (%): (S) 30 %  Rate Set (breaths/minute): 18 breaths/min  Tidal Volume Set (mL): 320 mL  PEEP (cm H2O): 5 cmH2O  Oxygen Concentration (%): 30 %  Resp: 20    2. INTAKE/ OUTPUT:   I/O last 3 completed shifts:  In: 1405 [I.V.:55; NG/GT:435]  Out: -  Urine Mixed with stool, unable to monitor accurately per nursing staff    3. PHYSICAL EXAMINATION:  General: Laying in bed, trachostomy, alert  Neuro: eyes open, following commands, alert and oriented x4  Pulm/Resp: coarse breath sounds bilaterally, breathing non-labored  CV: ST, pulses intact, no edema.   Abdomen: soft, non-distended, non-tender, bowel sounds active.   Incisions/Skin: left AV fistula with thrill and bruit    4. LABS:   Arterial  Blood Gases   No lab results found in last 7 days.  Complete Blood Count   Recent Labs   Lab 02/19/21 0458 02/18/21  0430 02/16/21  0612 02/15/21  1026 02/15/21  0418   WBC 3.0* 3.2* 4.0  --  3.7*   HGB 9.0* 8.8* 8.7* 8.4* 6.7*    209 145*  --  116*     Basic Metabolic Panel  Recent Labs   Lab 02/19/21 0458 02/18/21  0430 02/16/21  0612 02/15/21  0418    134 134 136   POTASSIUM 4.0 3.6 3.6 3.9   CHLORIDE 100 100 99 100   CO2 29 27 28 27   BUN 46* 29 34* 52*   CR 3.14* 2.19* 2.27* 2.75*   * 158* 154* 155*     Liver Function Tests  Recent Labs   Lab 02/19/21 0458   INR 0.99     Coagulation Profile  Recent Labs   Lab 02/19/21 0458   INR 0.99       5. RADIOLOGY:   No results found for this or any previous visit (from the past 24 hour(s)).

## 2021-02-19 NOTE — ANESTHESIA POSTPROCEDURE EVALUATION
Patient: Kecia Blue    Procedure(s):  BRONCHOSCOPY, flexible, airway dilation, and bronchial wash    Diagnosis:Bronchial stenosis, right [J98.09]  Diagnosis Additional Information: No value filed.    Anesthesia Type:  General    Note:  Disposition: Inpatient   Postop Pain Control: Uneventful            Sign Out: Well controlled pain   PONV: No   Neuro/Psych: Uneventful            Sign Out: Acceptable/Baseline neuro status   Airway/Respiratory: Uneventful            Sign Out: AIRWAY IN SITU/Resp. Support               Airway in situ/Resp. Support: Tracheostomy                 Reason: Planned Pre-op   CV/Hemodynamics: Uneventful            Sign Out: Acceptable CV status   Other NRE: NONE   DID A NON-ROUTINE EVENT OCCUR? No         Last vitals:  Vitals:    02/19/21 0756 02/19/21 0800 02/19/21 1000   BP:  100/61 103/64   Pulse: 106 106 93   Resp:  (P) 20    Temp:  (P) 36.7  C (98  F)    SpO2: 97% 95% 92%       Last vitals prior to Anesthesia Care Transfer:  CRNA VITALS  2/19/2021 0847 - 2/19/2021 0947      2/19/2021             SpO2:  94 %    EKG:  Sinus rhythm          Electronically Signed By: Flaco Pickett MD  February 19, 2021  10:29 AM

## 2021-02-19 NOTE — ANESTHESIA CARE TRANSFER NOTE
Patient: Kecia Blue    Procedure(s):  BRONCHOSCOPY, flexible, airway dilation, and bronchial wash    Diagnosis: Bronchial stenosis, right [J98.09]  Diagnosis Additional Information: No value filed.    Anesthesia Type:   General     Note:    Oropharynx: spontaneously breathing (via trach)  Level of Consciousness: awake  Oxygen Supplementation: blow-by O2    Independent Airway: airway patency satisfactory and stable  Dentition: dentition unchanged  Vital Signs Stable: post-procedure vital signs reviewed and stable  Report to RN Given: handoff report given  Patient transferred to: ICU  Comments: Pt's VSS, A/O x3 resting comfortably post procedure, care continued by ICU RN.   ICU Handoff: Call for PAUSE to initiate/utilize ICU HANDOFF, Identified Patient, Identified Responsible Provider, Reviewed the Pertinent Medical History, Discussed Surgical Course, Reviewed Intra-OP Anesthesia Management and Issues during Anesthesia, Set Expectations for Post Procedure Period and Allowed Opportunity for Questions and Acknowledgement of Understanding      Vitals: (Last set prior to Anesthesia Care Transfer)  CRNA VITALS  2/19/2021 0847 - 2/19/2021 0947      2/19/2021             SpO2:  94 %    EKG:  Sinus rhythm        Electronically Signed By: ADRIÁN Akbar CRNA  February 19, 2021  10:04 AM

## 2021-02-20 ENCOUNTER — APPOINTMENT (OUTPATIENT)
Dept: OCCUPATIONAL THERAPY | Facility: CLINIC | Age: 61
End: 2021-02-20
Attending: INTERNAL MEDICINE
Payer: COMMERCIAL

## 2021-02-20 ENCOUNTER — APPOINTMENT (OUTPATIENT)
Dept: PHYSICAL THERAPY | Facility: CLINIC | Age: 61
End: 2021-02-20
Attending: INTERNAL MEDICINE
Payer: COMMERCIAL

## 2021-02-20 ENCOUNTER — APPOINTMENT (OUTPATIENT)
Dept: SPEECH THERAPY | Facility: CLINIC | Age: 61
End: 2021-02-20
Attending: INTERNAL MEDICINE
Payer: COMMERCIAL

## 2021-02-20 LAB
ALBUMIN SERPL-MCNC: 2.4 G/DL (ref 3.4–5)
ALP SERPL-CCNC: 244 U/L (ref 40–150)
ALT SERPL W P-5'-P-CCNC: 34 U/L (ref 0–50)
ANION GAP SERPL CALCULATED.3IONS-SCNC: 6 MMOL/L (ref 3–14)
AST SERPL W P-5'-P-CCNC: 15 U/L (ref 0–45)
BILIRUB DIRECT SERPL-MCNC: 0.2 MG/DL (ref 0–0.2)
BILIRUB SERPL-MCNC: 0.4 MG/DL (ref 0.2–1.3)
BUN SERPL-MCNC: 32 MG/DL (ref 7–30)
CALCIUM SERPL-MCNC: 8.6 MG/DL (ref 8.5–10.1)
CHLORIDE SERPL-SCNC: 98 MMOL/L (ref 94–109)
CO2 SERPL-SCNC: 28 MMOL/L (ref 20–32)
CREAT SERPL-MCNC: 2.37 MG/DL (ref 0.52–1.04)
ERYTHROCYTE [DISTWIDTH] IN BLOOD BY AUTOMATED COUNT: 21.5 % (ref 10–15)
GFR SERPL CREATININE-BSD FRML MDRD: 22 ML/MIN/{1.73_M2}
GLUCOSE BLDC GLUCOMTR-MCNC: 107 MG/DL (ref 70–99)
GLUCOSE BLDC GLUCOMTR-MCNC: 121 MG/DL (ref 70–99)
GLUCOSE BLDC GLUCOMTR-MCNC: 128 MG/DL (ref 70–99)
GLUCOSE BLDC GLUCOMTR-MCNC: 132 MG/DL (ref 70–99)
GLUCOSE BLDC GLUCOMTR-MCNC: 147 MG/DL (ref 70–99)
GLUCOSE BLDC GLUCOMTR-MCNC: 153 MG/DL (ref 70–99)
GLUCOSE BLDC GLUCOMTR-MCNC: 156 MG/DL (ref 70–99)
GLUCOSE SERPL-MCNC: 154 MG/DL (ref 70–99)
HCT VFR BLD AUTO: 31.5 % (ref 35–47)
HGB BLD-MCNC: 10 G/DL (ref 11.7–15.7)
MCH RBC QN AUTO: 31.9 PG (ref 26.5–33)
MCHC RBC AUTO-ENTMCNC: 31.7 G/DL (ref 31.5–36.5)
MCV RBC AUTO: 101 FL (ref 78–100)
MYCOBACTERIUM SPEC CULT: NORMAL
MYCOBACTERIUM SPEC CULT: NORMAL
PLATELET # BLD AUTO: 217 10E9/L (ref 150–450)
POTASSIUM SERPL-SCNC: 4.2 MMOL/L (ref 3.4–5.3)
PROT SERPL-MCNC: 6.4 G/DL (ref 6.8–8.8)
RBC # BLD AUTO: 3.13 10E12/L (ref 3.8–5.2)
SODIUM SERPL-SCNC: 132 MMOL/L (ref 133–144)
SPECIMEN SOURCE: NORMAL
WBC # BLD AUTO: 2.5 10E9/L (ref 4–11)

## 2021-02-20 PROCEDURE — 97110 THERAPEUTIC EXERCISES: CPT | Mod: GO

## 2021-02-20 PROCEDURE — 80048 BASIC METABOLIC PNL TOTAL CA: CPT | Performed by: STUDENT IN AN ORGANIZED HEALTH CARE EDUCATION/TRAINING PROGRAM

## 2021-02-20 PROCEDURE — 200N000002 HC R&B ICU UMMC

## 2021-02-20 PROCEDURE — 250N000013 HC RX MED GY IP 250 OP 250 PS 637: Performed by: STUDENT IN AN ORGANIZED HEALTH CARE EDUCATION/TRAINING PROGRAM

## 2021-02-20 PROCEDURE — 999N000157 HC STATISTIC RCP TIME EA 10 MIN

## 2021-02-20 PROCEDURE — 250N000009 HC RX 250: Performed by: STUDENT IN AN ORGANIZED HEALTH CARE EDUCATION/TRAINING PROGRAM

## 2021-02-20 PROCEDURE — 250N000013 HC RX MED GY IP 250 OP 250 PS 637: Performed by: NURSE PRACTITIONER

## 2021-02-20 PROCEDURE — 97535 SELF CARE MNGMENT TRAINING: CPT | Mod: GO

## 2021-02-20 PROCEDURE — 93010 ELECTROCARDIOGRAM REPORT: CPT | Performed by: INTERNAL MEDICINE

## 2021-02-20 PROCEDURE — 94668 MNPJ CHEST WALL SBSQ: CPT

## 2021-02-20 PROCEDURE — 250N000013 HC RX MED GY IP 250 OP 250 PS 637: Performed by: INTERNAL MEDICINE

## 2021-02-20 PROCEDURE — 250N000011 HC RX IP 250 OP 636: Performed by: NURSE PRACTITIONER

## 2021-02-20 PROCEDURE — 94640 AIRWAY INHALATION TREATMENT: CPT | Mod: 76

## 2021-02-20 PROCEDURE — 99233 SBSQ HOSP IP/OBS HIGH 50: CPT | Performed by: NURSE PRACTITIONER

## 2021-02-20 PROCEDURE — 99233 SBSQ HOSP IP/OBS HIGH 50: CPT | Performed by: INTERNAL MEDICINE

## 2021-02-20 PROCEDURE — 250N000012 HC RX MED GY IP 250 OP 636 PS 637: Performed by: INTERNAL MEDICINE

## 2021-02-20 PROCEDURE — 36415 COLL VENOUS BLD VENIPUNCTURE: CPT | Performed by: STUDENT IN AN ORGANIZED HEALTH CARE EDUCATION/TRAINING PROGRAM

## 2021-02-20 PROCEDURE — 250N000009 HC RX 250: Performed by: NURSE PRACTITIONER

## 2021-02-20 PROCEDURE — 94003 VENT MGMT INPAT SUBQ DAY: CPT

## 2021-02-20 PROCEDURE — 94640 AIRWAY INHALATION TREATMENT: CPT

## 2021-02-20 PROCEDURE — 92507 TX SP LANG VOICE COMM INDIV: CPT | Mod: GN

## 2021-02-20 PROCEDURE — 272N000078 HC NUTRITION PRODUCT INTERMEDIATE LITER

## 2021-02-20 PROCEDURE — 97530 THERAPEUTIC ACTIVITIES: CPT | Mod: GP

## 2021-02-20 PROCEDURE — 99291 CRITICAL CARE FIRST HOUR: CPT | Mod: GC | Performed by: INTERNAL MEDICINE

## 2021-02-20 PROCEDURE — 97116 GAIT TRAINING THERAPY: CPT | Mod: GP

## 2021-02-20 PROCEDURE — 80076 HEPATIC FUNCTION PANEL: CPT | Performed by: STUDENT IN AN ORGANIZED HEALTH CARE EDUCATION/TRAINING PROGRAM

## 2021-02-20 PROCEDURE — 85027 COMPLETE CBC AUTOMATED: CPT | Performed by: STUDENT IN AN ORGANIZED HEALTH CARE EDUCATION/TRAINING PROGRAM

## 2021-02-20 PROCEDURE — 999N001017 HC STATISTIC GLUCOSE BY METER IP

## 2021-02-20 RX ORDER — NALOXONE HYDROCHLORIDE 0.4 MG/ML
0.4 INJECTION, SOLUTION INTRAMUSCULAR; INTRAVENOUS; SUBCUTANEOUS
Status: DISCONTINUED | OUTPATIENT
Start: 2021-02-20 | End: 2021-02-21

## 2021-02-20 RX ORDER — NALOXONE HYDROCHLORIDE 0.4 MG/ML
0.2 INJECTION, SOLUTION INTRAMUSCULAR; INTRAVENOUS; SUBCUTANEOUS
Status: DISCONTINUED | OUTPATIENT
Start: 2021-02-20 | End: 2021-02-21

## 2021-02-20 RX ADMIN — INSULIN ASPART 1 UNITS: 100 INJECTION, SOLUTION INTRAVENOUS; SUBCUTANEOUS at 04:19

## 2021-02-20 RX ADMIN — ACETYLCYSTEINE 2 ML: 200 SOLUTION ORAL; RESPIRATORY (INHALATION) at 20:10

## 2021-02-20 RX ADMIN — PREDNISONE 2.5 MG: 2.5 TABLET ORAL at 19:39

## 2021-02-20 RX ADMIN — Medication 1 PACKET: at 19:39

## 2021-02-20 RX ADMIN — Medication 40 MG: at 08:23

## 2021-02-20 RX ADMIN — ALBUTEROL SULFATE 2.5 MG: 2.5 SOLUTION RESPIRATORY (INHALATION) at 20:10

## 2021-02-20 RX ADMIN — HEPARIN SODIUM 5000 UNITS: 5000 INJECTION, SOLUTION INTRAVENOUS; SUBCUTANEOUS at 22:17

## 2021-02-20 RX ADMIN — INSULIN ASPART 1 UNITS: 100 INJECTION, SOLUTION INTRAVENOUS; SUBCUTANEOUS at 23:51

## 2021-02-20 RX ADMIN — BUDESONIDE 0.5 MG: 0.5 INHALANT ORAL at 20:10

## 2021-02-20 RX ADMIN — HEPARIN SODIUM 5000 UNITS: 5000 INJECTION, SOLUTION INTRAVENOUS; SUBCUTANEOUS at 16:13

## 2021-02-20 RX ADMIN — METOPROLOL TARTRATE 50 MG: 25 TABLET, FILM COATED ORAL at 08:30

## 2021-02-20 RX ADMIN — Medication 5 ML: at 08:23

## 2021-02-20 RX ADMIN — Medication 1 PACKET: at 08:38

## 2021-02-20 RX ADMIN — INSULIN ASPART 1 UNITS: 100 INJECTION, SOLUTION INTRAVENOUS; SUBCUTANEOUS at 08:23

## 2021-02-20 RX ADMIN — OLANZAPINE 10 MG: 2.5 TABLET ORAL at 19:38

## 2021-02-20 RX ADMIN — Medication 1 PACKET: at 08:25

## 2021-02-20 RX ADMIN — ALBUTEROL SULFATE 2.5 MG: 2.5 SOLUTION RESPIRATORY (INHALATION) at 01:45

## 2021-02-20 RX ADMIN — ACETYLCYSTEINE 2 ML: 200 SOLUTION ORAL; RESPIRATORY (INHALATION) at 08:51

## 2021-02-20 RX ADMIN — TACROLIMUS 2.5 MG: 5 CAPSULE ORAL at 18:21

## 2021-02-20 RX ADMIN — POSACONAZOLE 300 MG: 40 SUSPENSION ORAL at 14:47

## 2021-02-20 RX ADMIN — PREDNISONE 5 MG: 5 TABLET ORAL at 08:30

## 2021-02-20 RX ADMIN — POSACONAZOLE 300 MG: 40 SUSPENSION ORAL at 08:23

## 2021-02-20 RX ADMIN — ACETAMINOPHEN 325 MG: 325 TABLET, FILM COATED ORAL at 14:47

## 2021-02-20 RX ADMIN — ALBUTEROL SULFATE 2.5 MG: 2.5 SOLUTION RESPIRATORY (INHALATION) at 08:51

## 2021-02-20 RX ADMIN — BUDESONIDE 0.5 MG: 0.5 INHALANT ORAL at 08:51

## 2021-02-20 RX ADMIN — ALBUTEROL SULFATE 2.5 MG: 2.5 SOLUTION RESPIRATORY (INHALATION) at 15:07

## 2021-02-20 RX ADMIN — TACROLIMUS 2 MG: 5 CAPSULE ORAL at 08:23

## 2021-02-20 RX ADMIN — POSACONAZOLE 300 MG: 40 SUSPENSION ORAL at 19:39

## 2021-02-20 RX ADMIN — METOPROLOL TARTRATE 50 MG: 25 TABLET, FILM COATED ORAL at 19:39

## 2021-02-20 RX ADMIN — INSULIN GLARGINE 10 UNITS: 100 INJECTION, SOLUTION SUBCUTANEOUS at 08:24

## 2021-02-20 ASSESSMENT — ACTIVITIES OF DAILY LIVING (ADL)
ADLS_ACUITY_SCORE: 15

## 2021-02-20 ASSESSMENT — MIFFLIN-ST. JEOR: SCORE: 1091

## 2021-02-20 NOTE — PROGRESS NOTES
MEDICAL ICU PROGRESS NOTE  02/20/2021     [facility]  CRITICAL CARE STAFF NOTE    Acute Issues     Acute on chronic hypoxemic respiratory failure with multiple failed extubations and now tolerating trach dome: Thought to be secondary to PJP PNA in setting BLST and right mainstem stenosis.  Has now completed 21 days of Bactrim.  Status post trach 2/12.  Tolerating trach dome trials well.     Contiunue prophylaxis dose of Bactrim (completed 21 days of treatment)    Underwent balloon dilatation of right mainstem by IP yesterday, mainstem dilated to 10mm (see IP note/pictures)    Trach dome 24 hrs a day as able, can use PS if tiring out    Continue saline and Mucomyst nebs for secretion clearance     ICU sedation, history of anxiety: improving and feeling less anxious since trach placement.    Continue bedtime and as needed Zyprexa     History of bilateral sequential lung transplant (2018): Complicated by right mainstem bronchial stenosis requiring balloon dilatation and stent placement in the past.  Appreciate pulmonary transplant team assistance.     Right mainstem balloon dilatation yesterday (see above)    Trach dome trials ongoing    Continue valganciclovir per pulm recs    Posaconazole level subtherapeutic, increased to 300 mg TID    Continue tacrolimus dose 2/2.5 mg (increased on 2/15)    On home dose of prednisone      Nutrition: tolerating feeds via PEG-J (placed 2/16).     Tolerating feeds, at goal     ESRD:     HD schedule per renal      ICU disposition: Will continue in the ICU for another 24 hours while on trach dome. Will transfer to  tomorrow. Long term plan is for transfer to [facility] due to dialysis needs.    The patient was seen and examined with the resident/fellow physician.  We have discussed the patient in detail and I agree with the findings, assessment, and plan as documented when this note was cosigned on this day. The plan was formulated in conjunction with pharmacy, ICU  nurses, and respiratory therapist. I have evaluated all laboratory values and imaging studies for the past 24 hours. I have reviewed all the consults that have been ordered and are active for this patient.      Critical Care Time: 30 min.  I spent this time (excluding procedures) personally providing and directing critical care services at the bedside and on the critical care unit.      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 Found to have PCP pneumonia. No s/p tracheostomy on trach dome.     CHANGES and MAJOR THINGS TODAY:   - Continue trach dome   - Discharge planning to LTACH vs TCU  - Weekly COVID test per hospital policy, next 2/19  - Pulmonary following: appreciate recommendation   - Discuss anticoagulation with transplant time   - Resume DVT prophylaxis    PLAN:  Neuro:  # Pain and sedation  - Hydroxyzine prn for anxiety    # Delirium, resolved   # Encephalopathy, resolved   - Scheduled 10 mg at bedtime and PRN Zyprexa     # Insomnia  - Melatonin available prn    # Anisocoria (L>R)  Intermittently fixed and dilated pupils. CTH unremarkable. Neuro examination unremarkable.  - Opthomalogy evaluated on 2/1    Pulmonary:  # Acute Hypoxic Respiratory Failure  # Tracheostomy (2/12/21)  2/2 PJP initially. OSH CT 1/23 showed bilateral ground glass opacities and consolidative lung infiltrates centrally distributed to the upper lobes and RLL. COVID  negative x3 at outside hospital. Concern for secondary pneumonia given worsening clinical picture. 2/4 CT chest indicated worsening consolidations consistent with progression of pneumocystis with possibilty of superimposed secondary pneumonia. Previously on inhaled epoprostenol. Completed course of bactrim. Now s/p perc tracheostomy 2/12 with interventional pulm and R mainstem dilation today 2/19 2/19.   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  - Positive PJP PCR on 1/28 with positive fungitel   - Continue PTA tacrolimus   - Continue VGCV for CMV ppx through 2/24   - Repeat EBV DNA 2/22   - Started home dose Prednisone 2/18, completed taper dose   - Pulmonary lung transplant team consulted and following, appreciate recs    - R mainstem dilation today 2/19   - Continue enteral Posaconazole 300 mg TID (increased 2/17), repeat level 2/23 (ordered), recommend monitoring LFTs and QTc    Cardiovascular:  # Shock, resolved  # Transient hypotension related to dialysis, sleep- Resolved  Last ECHO 10/21/20 showed EF 60-65%, normal LV and RV function.     # Afib with RVR, resolved  # ST  Intermittently going in and out of A-fib, currently ST.  - Metoprolol 50mg BID  - Discuss anticoagulation with transplant team     # Hx HTN  - Hold PTA carvedilol and amlodipine    GI/Nutrition:  # Portal HTN  Liver biopsy positive for congestive hepatopathy. Previous had ascites thought to be r/t elevated right sided heart pressures. Last saw GI on 12/21/20 and patient endorsed that her ascites is no longer present. Etiology unclear.   - NTD    # Nutrition  - IR PEG-J tube  placement 2/16, currently on TF  - Daily fiber via PEG-J  tube      # Constipation-resolved and now with loose stools  - Hx of constipation with bactrim, reports frequent BMs  - Change schedule bowel regimen to PRN, due to multiple loose stools     Renal/Fluids/Electrolytes:  # ESRD on iHD twice weekly(M/Th)  # Anion gap metabolic acidosis r/t ESRD, uremia, resolved  Outpatient dry body weight 56.5 kg. CRRT 2/4 to 2/8. Last HD 2/11 for 2L off   - Nephrology following   - iHD per nephrology   - I&O  - Daily weights   - Trend BMP     # Hypomagnesemia- resolved  - Replace Mag x1    Endocrine:  # Hyperglycemia, steroid induced  - High intensity sliding scale insulin    # Seronegative RA with Raynaud's   - NTD, monitor     ID:  # Sepsis in setting of PJP pneumonia, possible secondary pneumonia  Suspected related to PJP pneumonia. OSH CT 1/23 + bilateral ground glass opacities and consolidative lung infiltrates centrally distributed to the upper lobes and RLL. Covid-19 negative X3 at OSH. Procalcitonin negative. Urine strep, CMV, and RPP negative. Given acute and severe decompensation, concern for secondary pneumonia. 2/4 CT chest which displayed worsening consolidations consistent with progression of pneumocystis with the possibility of superimposed secondary pneumonia. Completed 21 day course of bactrim and 10 day course of zosyn. Now tolerating trach dome well.   - PJP PCR positive, fungitell positive as well  - Weekly COVID test per hospital policy, next 2/19    # Chronic/Stable right aspergillus empyema   - Continue posaconazole    Hematology:    # Anemia r/t renal disease, chronic stable   # Thrombocytopenia r/t critical illness  # Leukocytosis, infection/steroids  Takes Aranesp 25 mcg every four weeks, last dose 1/14, next scheduled was 2/11.   Currently receiving epoetin alisha-epox with dialysis   - Discussed with nephrology, will add replacement with HD run on 2/13   - iHD today    Musculoskeletal:  # Generalized  weakness  - PT/OT    Skin:  # Dermatomyositis antitryptase syndrome   - Noted, NTD    General Cares/Prophylaxis:    DVT Prophylaxis: Heparin subcutaneous (will hold for procedure)   GI P rophylaxis: PPI  Restraints: none  Family Communication:  updated at the bedside  Code Status: full    Lines/tubes/drains:  - Trach  - HD fistula left forearm    Disposition:  - Medical ICU, will discuss disposition with transplant team     Patient seen and findings/plan discussed with medical ICU staff, Dr. Jean.    Emanuel Fenton MD  Internal Medicine, PGY-2  [facility]   Pager: [phone]    ====================================  INTERVAL HISTORY:   No acute events overnight. Sleeping comfortably. No complaints.     OBJECTIVE:   1. VITAL SIGNS:   Temp:  [97.4  F (36.3  C)-98.4  F (36.9  C)] 98.3  F (36.8  C)  Pulse:  [] 92  Resp:  [15-24] 20  BP: ()/(54-84) 114/71  FiO2 (%):  [30 %-45 %] 30 %  SpO2:  [91 %-100 %] 97 %  Ventilation Mode: Trach collar  FiO2 (%): 30 %  Rate Set (breaths/minute): 18 breaths/min  Tidal Volume Set (mL): 320 mL  PEEP (cm H2O): 5 cmH2O  Oxygen Concentration (%): 30 %  Resp: 20    2. INTAKE/ OUTPUT:   I/O last 3 completed shifts:  In: 1900 [I.V.:200; NG/GT:650]  Out: 3000 [Other:3000] Urine Mixed with stool, unable to monitor accurately per nursing staff    3. PHYSICAL EXAMINATION:  General: Laying in bed, trachostomy, alert  Neuro: eyes open, following commands, alert and oriented x4  Pulm/Resp: coarse breath sounds bilaterally, breathing non-labored  CV: ST, pulses intact, no edema.   Abdomen: soft, non-distended, non-tender, bowel sounds active.   Incisions/Skin: left AV fistula with thrill and bruit    4. LABS:   Arterial Blood Gases   No lab results found in last 7 days.  Complete Blood Count   Recent Labs   Lab 02/20/21  0503 02/19/21  0458 02/18/21  0430 02/16/21  0612   WBC 2.5* 3.0* 3.2* 4.0   HGB 10.0* 9.0* 8.8* 8.7*    207 209 145*     Basic Metabolic  Panel  Recent Labs   Lab 02/20/21  0503 02/19/21 0458 02/18/21  0430 02/16/21  0612   * 134 134 134   POTASSIUM 4.2 4.0 3.6 3.6   CHLORIDE 98 100 100 99   CO2 28 29 27 28   BUN 32* 46* 29 34*   CR 2.37* 3.14* 2.19* 2.27*   * 102* 158* 154*     Liver Function Tests  Recent Labs   Lab 02/20/21  0503 02/19/21 0458   AST 15  --    ALT 34  --    ALKPHOS 244*  --    BILITOTAL 0.4  --    ALBUMIN 2.4*  --    INR  --  0.99     Coagulation Profile  Recent Labs   Lab 02/19/21 0458   INR 0.99       5. RADIOLOGY:   No results found for this or any previous visit (from the past 24 hour(s)).

## 2021-02-20 NOTE — PROGRESS NOTES
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  Patient was admitted to OSH on  for acute hypoxemic respiratory failure and new lung opacities. Intubated  and transferred to [facility] for ongoing management.  Extubated  but reintubated - for progressive hypoxemia.  Rapid decompensation /3 with ARDS presentation requiring reintubation, prone positioning, and inhaled epoprostenol, suspected d/t worsening PJP. S/p Trach , and PEG-J ().  S/p dilation of right main by IP on .  Has remained stable on TD exclusively since bronch.      Today's recommendations:  - Follow bronch cultures from   - TD exclusively as tolerated  - Will discuss possible downsize of trach with IP on Monday  - Posaconazole level  (ordered)  - Tacro level  (ordered)  - Continue VGCV for CMV ppx through  (one week after steroid burst completion)  - Repeat EBV  (ordered)     Acute hypoxemic respiratory failure:   Right post-obstructive Stenotrophomonas and Eikenella pneumonia:  PJP pneumonia:  Septic shock, Resolved:   Airway stenosis with distal plugging s/p right mainstem dilation ():  ARDS: Presented to OSH ED with increased SOB and hypoxia, intubated  with need for pressors, transferred to [facility].  PTA bronch (20) with moderate airway obstruction and RML/RLL mucous plugging, cultures with Stenotrophomonas and Eikenella.  OSH CT chest with new multifocal GGO bilaterally and  increased left loculated pleural effusion.  Repeat bronch (1/24) with RUL BAL with thick copious secretions to RML/RLL, PJP PCR positive.  Extubated on 1/26, but reintubated from 1/27-1/29 for progressive hypoxemia.  Continued to require significant PEEP, eventually decompensated on 2/3 and again reintubated.  Presentation consistent with ARDS (tx: prone positioning, inhaled epoprostenol, pressors).  CT chest (2/3) with progressive consolidation and possible superimposed infection findings, stable LLL chronic empyema.  Bronch on 2/11 with persistent right mainstem stenosis with moderate airway obstruction.  S/p trach 2/12 by IP.  S/p steroid burst with taper through 2/17 and 21 day Bactrim course for PJP through 2/15.  Hypoxia improving, TD exclusively since 2/19.  - Bronch cultures (2/19) with GPC  - TD exclusively as tolerated  - SLP following for PMSV trials and eventual swallowing studies  - Chest physiotherapy QID and nebs: albuterol QID, Mucomyst BID, and Pulmicort BID  - Will discuss possible downsize of current Shiley #8 trach with IP on Monday, as this will be beneficial for pt's SLP progress  - Repeat IP bronch in 6 weeks (~4/2)     Aspergillus empyema (left-sided): Noted 10/8/19, negative in November and again on admission.  CT scan on 7/17/20 with increased mass-like density, likely pleural-based, in LLL area s/p needle aspiration (8/13/20) with Aspergillus fumigatus on cultures s/p intrapleural amphotericin bead placement (11/20).  Follows with ID as OP.  LFTs stable on admission (ALP chronically mildly elevated).  CT chest with increased loculated left pleural effusion s/p thoracentesis (1/25), exudative with 87% neutrophils, very high LDL (6308), cytology normal, AFB pleural cultures NGTD-stain negative.  - Posaconazole IV (transitioned from PO 2/9 d/t subtherapeutic level).  Repeat level (2/16) remains subtherapeutic, increased to TID dosing (2/18).  Repeat level 2/25 (ordered).  Plan for indefinite  course.     S/p bilateral lung transplant for ILD 2/2 CTD: Last seen in pulmonary clinic 12/2. DSA (1/25) not detected.     Immunosuppression: On 2 drug IST d/t recurrent infections  - Tacrolimus 2 mg qAM/ 2.5 mg qPM.  Goal level 8-10. Repeat level 2/22 (ordered).  - Chronic prednisone 5 mg qAM / 2.5 mg qPM (transitioned from burst steroids 2/18)     Prophylaxis:   - Bactrim qMWF for PJP ppx     H/o CMV viremia: CMV D+/R+.  CMV <137 (1/25).  - Continue VGCV for CMV ppx through 2/24 (one week after steroid burst completion)     EBV viremia: Level 12/9/20 mildly elevated to 10K (log 4) from prior 3K (3.5 log) on 9/9/20, repeat (1/25/21) decreased to 5619 copies (log of 3.8).  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 Chronic iHD M/Th via AVF with partial graft.  Not able to tolerate iHD 2/3 d/t hypotension, line placed and transitioned to CRRT 2/4. Now back to iHD, qMWF schedule.  - Dialysis management per nephrology     Atrial fibrillation w/ RVR:  H/o DVT:  H/o paroxysmal AF, most recent recurrence 1/30 with RVR (-140s), likely in the setting of acute pulmonary illness.  - Management per MICU, agree with need for anticoagulation     Oropharyngeal candidiasis: White tongue plaque noted 2/1.  - Nystatin QID (2/1)      We appreciate the excellent care provided by the MICU team. Recommendations communicated via phone and this note. Will continue to follow along closely, please do not hesitate to call with any questions or concerns.     Patient discussed with Dr. Macias.    Hina Huff, DNP, APRN, CNP  Inpatient Nurse Practitioner  Pulmonary CF/Transplant     Subjective & Interval History:     Pt. feeling well this morning.  Tolerated bronch yesterday without issue and has remained on TD 30% since that time.  Brief PMSV trial with SLP.  Minimal cough and secretions.  Up with therapy, ambulating with report of leg fatigue.  HD run  "yesterday for 3L.    Review of Systems:     C: No fever, no chills, + decreasing weight  INTEGUMENTARY/SKIN: No rash or obvious new lesions  ENT/MOUTH: No sore throat, no sinus pain, no nasal congestion or drainage  RESP: See interval history  CV: No chest pain, no palpitations, no peripheral edema, no orthopnea  GI: No nausea, no vomiting, no reflux symptoms  : No dysuria  MUSCULOSKELETAL: No myalgias, no arthralgias  ENDOCRINE: Blood sugars with adequate control  NEURO: No headache, no numbness or tingling  PSYCHIATRIC: Mood stable    Physical Exam:     Vital signs:  Temp: 98.6  F (37  C) Temp src: Oral BP: 122/71 Pulse: 90   Resp: 20 SpO2: 96 % O2 Device: Trach dome Oxygen Delivery: 40 LPM Height: 160 cm (5' 2.99\") Weight: 54.2 kg (119 lb 7.8 oz)  I/O:     Intake/Output Summary (Last 24 hours) at 2/20/2021 1015  Last data filed at 2/20/2021 0900  Gross per 24 hour   Intake 1710 ml   Output 3000 ml   Net -1290 ml     Constitutional: Sitting up in a chair,  seated adjacent, in no apparent distress.   HEENT: Eyes with pink conjunctivae, anicteric. Oral mucosa moist without lesions. Trach in place.  PULM: Mildly diminished bases bilaterally. Audible superficial crackles t/o, no rhonchi, no wheezes. Non-labored breathing on TD 30%.  CV: Normal S1 and S2. RRR. No murmur, gallop, or rub. No peripheral edema.   ABD: NABS, soft, nontender, nondistended.  PEG/J site not visualized.  MSK: Moves all extremities.   NEURO: Alert and oriented, conversant by mouthing words.   SKIN: Warm, dry. No rash on limited exam.   PSYCH: Mood stable.     Lines, Drains, and Devices:  Peripheral IV 02/16/21 Right;Posterior Lower forearm (Active)   Site Assessment WDL 02/20/21 0800   Line Status Saline locked 02/20/21 0800   Phlebitis Scale 0-->no symptoms 02/20/21 0800   Infiltration Scale 0 02/20/21 0800   Infiltration Site Treatment Method  None 02/20/21 0800   Number of days: 4       Peripheral IV 02/19/21 Right;Anterior Upper " forearm (Active)   Site Assessment WDL 02/20/21 0800   Line Status Saline locked 02/20/21 0800   Phlebitis Scale 0-->no symptoms 02/20/21 0800   Infiltration Scale 0 02/20/21 0800   Infiltration Site Treatment Method  None 02/20/21 0800   Number of days: 1       CVC Double Lumen 10/25/19 Right Internal jugular (Active)   Number of days: 484       Hemodialysis Vascular Access Arteriovenous fistula Left Arm (Active)   Site Assessment WDL;Bruit present 02/20/21 0800   Cannulation Needle Size 15 02/19/21 1550   Dressing Intervention New dressing 02/19/21 1915   Dressing Status Clean, dry, intact 02/20/21 0800   Verification by x-ray Not applicable 02/20/21 0800   Hand Off Report Yes 02/19/21 1915   Number of days: 26     Data:     LABS    CMP:   Recent Labs   Lab 02/20/21  0503 02/19/21  0458 02/18/21  0430 02/16/21  0612 02/15/21  0418 02/14/21  0336   * 134 134 134 136 136   POTASSIUM 4.2 4.0 3.6 3.6 3.9 3.8   CHLORIDE 98 100 100 99 100 100   CO2 28 29 27 28 27 29   ANIONGAP 6 5 8 7 8 7   * 102* 158* 154* 155* 149*   BUN 32* 46* 29 34* 52* 32*   CR 2.37* 3.14* 2.19* 2.27* 2.75* 1.84*   GFRESTIMATED 22* 16* 24* 23* 18* 30*   GFRESTBLACK 25* 18* 28* 27* 21* 34*   CHELSEY 8.6 8.8 8.4* 8.9 8.6 8.6   MAG  --   --   --   --  1.9 1.8   PHOS  --   --   --   --  4.5 3.6   PROTTOTAL 6.4*  --   --   --   --   --    ALBUMIN 2.4*  --   --   --   --   --    BILITOTAL 0.4  --   --   --   --   --    ALKPHOS 244*  --   --   --   --   --    AST 15  --   --   --   --   --    ALT 34  --   --   --   --   --      CBC:   Recent Labs   Lab 02/20/21  0503 02/19/21  0458 02/18/21  0430 02/16/21  0612   WBC 2.5* 3.0* 3.2* 4.0   RBC 3.13* 2.96* 2.87* 2.87*   HGB 10.0* 9.0* 8.8* 8.7*   HCT 31.5* 29.3* 28.3* 27.4*   * 99 99 96   MCH 31.9 30.4 30.7 30.3   MCHC 31.7 30.7* 31.1* 31.8   RDW 21.5* 21.4* 21.3* 21.1*    207 209 145*       INR:   Recent Labs   Lab 02/19/21 0458   INR 0.99       Glucose:   Recent Labs   Lab  02/20/21  0822 02/20/21  0503 02/20/21  0417 02/20/21  0013 02/19/21  2047 02/19/21  1815 02/19/21  1159 02/19/21  0458 02/19/21  0458 02/18/21  0430 02/18/21  0430 02/16/21  0612 02/16/21  0612 02/15/21  0418 02/15/21  0418 02/14/21  0336 02/14/21  0336   GLC  --  154*  --   --   --   --   --   --  102*  --  158*  --  154*  --  155*  --  149*   *  --  156* 121* 101* 138* 118*   < >  --    < >  --    < >  --    < >  --    < >  --     < > = values in this interval not displayed.       Blood Gas: No lab results found in last 7 days.    Culture Data   Recent Labs   Lab 02/19/21  0853   CULT Culture negative after 1 hour  PENDING  PENDING       Virology Data:   Lab Results   Component Value Date    FLUAH1 Not Detected 01/24/2021    FLUAH3 Not Detected 01/24/2021    PH4502 Not Detected 01/24/2021    IFLUB Not Detected 01/24/2021    RSVA Not Detected 01/24/2021    RSVB Not Detected 01/24/2021    PIV1 Not Detected 01/24/2021    PIV2 Not Detected 01/24/2021    PIV3 Not Detected 01/24/2021    HMPV Not Detected 01/24/2021    HRVS Negative 01/24/2021    ADVBE Negative 01/24/2021    ADVC Negative 01/24/2021    ADVC Negative 12/23/2020    ADVC Negative 10/07/2019       Historical CMV results (last 3 of prior testing):  Lab Results   Component Value Date    CMVQNT <137 (A) 01/25/2021    CMVQNT 238 (A) 01/24/2021    CMVQNT 675 (A) 12/23/2020     Lab Results   Component Value Date    CMVLOG <2.1 01/25/2021    CMVLOG 2.4 (H) 01/24/2021    CMVLOG 2.8 (H) 12/23/2020       Urine Studies    Recent Labs   Lab Test 01/24/21  1729 10/21/19  2240   URINEPH 5.0 5.0   NITRITE Negative Negative   LEUKEST Moderate* Large*   WBCU 34* 115*       Most Recent [place] Pulmonary Function Testing (FVC/FEV1 only)  FVC-Pre   Date Value Ref Range Status   12/09/2020 1.12 L    09/09/2020 1.56 L    03/09/2020 1.57 L    02/19/2020 1.78 L      FVC-%Pred-Pre   Date Value Ref Range Status   12/09/2020 34 %    09/09/2020 47 %    03/09/2020 48 %     02/19/2020 54 %      FEV1-Pre   Date Value Ref Range Status   12/09/2020 0.98 L    09/09/2020 1.10 L    03/09/2020 0.96 L    02/19/2020 0.99 L      FEV1-%Pred-Pre   Date Value Ref Range Status   12/09/2020 37 %    09/09/2020 42 %    03/09/2020 37 %    02/19/2020 38 %        IMAGING    No results found for this or any previous visit (from the past 48 hour(s)).

## 2021-02-20 NOTE — PROGRESS NOTES
"Nephrology Progress Note  02/20/2021         Kecia Blue is a 60 year old female with PMHx most significant for ILD with antisynthetase sydrome s/p BSLT 03/2018 (CMV D+/R+, EBV D+/R+) postoperatively complicated by Left mainstem bronchial stenosis s/p several dilations, left sided aspergillus empyema (10/2019), and CMV viremia who was intubated for HRF at OSH and [facility] for continued management. Nephrology consutled for dialysis needs.     Interval History :   Mrs Blue had HD yesterday with 3L UF  She remains hemodynamically stable off pressors.  Remains anuric.       Assessment & Recommendations:   ESRD-Runs at [place] through [facility] group.  Has atypical HD schedule of Monday and Thursday, has AVF with partial graft which has been challenging to access per RN's.  Needed CRRT 2/5-2/8 due to hemodynamic instability but otherwise has run iHD.  Working on getting to stable 3x/week schedule, may be progressing to [facility] for resp failure.                   - No HD planned for today.  Will reassess need tomorrow, but anticipate next HD likely 2/22                 -Has L arm AVF/graft, somewhat challenging access.     Volume status-Has mild abdominal edema but improving, will try for 3L again today but would not be surprised if we need to back off to 2L, will try to get standing wt if ambulatory this afternoon.       Electrolytes/pH-at target      Anemia- EPO 4k with HD.       Nutrition-Nutren TF.    Case Tan MD  Division of Nephrology and Hypertension  Pager: [phone]  February 20, 2021  8:54 AM      Review of Systems:   I reviewed the following systems:  Gen: No fevers or chills  CV: No CP at rest  Resp: No SOB at rest  GI: No N/V    Physical Exam:   I/O last 3 completed shifts:  In: 1900 [I.V.:200; NG/GT:650]  Out: 3000 [Other:3000]   /71 (BP Location: Right arm)   Pulse 90   Temp 98.6  F (37  C) (Oral)   Resp 20   Ht 1.6 m (5' 2.99\")   Wt 54.2 kg (119 lb 7.8 " oz)   LMP 06/07/2014 (Exact Date)   SpO2 98%   BMI 21.17 kg/m       GENERAL APPEARANCE: Vent via trached.   Alert, communicative.  sitting comfortably in chair  EYES: no scleral icterus  Endo: no moon facies  Pulmonary: coarse nbilateral air sounds  CV: regular rhythm, normal rate - no rub  Edema 1-2+ dependent in upper thighs.  Much improved compared to 5 days ago  GI: soft, non distended  MS: no evidence of inflammation in joints, no muscle tenderness  SKIN: warm, dry,     NEURO: No focal deficits.     Labs:   All labs reviewed by me  Electrolytes/Renal -   Recent Labs   Lab Test 02/20/21  0503 02/19/21  0458 02/18/21  0430 02/15/21  0418 02/15/21  0418 02/14/21  0336 02/13/21  0323   * 134 134   < > 136 136 134   POTASSIUM 4.2 4.0 3.6   < > 3.9 3.8 4.2   CHLORIDE 98 100 100   < > 100 100 100   CO2 28 29 27   < > 27 29 26   BUN 32* 46* 29   < > 52* 32* 52*   CR 2.37* 3.14* 2.19*   < > 2.75* 1.84* 2.59*   * 102* 158*   < > 155* 149* 142*   CHELSEY 8.6 8.8 8.4*   < > 8.6 8.6 8.7   MAG  --   --   --   --  1.9 1.8 2.4*   PHOS  --   --   --   --  4.5 3.6 5.3*    < > = values in this interval not displayed.       CBC -   Recent Labs   Lab Test 02/20/21  0503 02/19/21  0458 02/18/21  0430   WBC 2.5* 3.0* 3.2*   HGB 10.0* 9.0* 8.8*    207 209       LFTs -   Recent Labs   Lab Test 02/20/21  0503 02/09/21  1615 02/09/21  0403   ALKPHOS 244* 218* 182*   BILITOTAL 0.4 0.4 0.3   ALT 34 32 28   AST 15 6 <3   PROTTOTAL 6.4* 6.1* 5.6*   ALBUMIN 2.4* 2.0* 1.8*       Iron Panel -   Recent Labs   Lab Test 02/03/21  0415 12/13/18  1033 08/01/18  0921   IRON 51 16* 93   IRONSAT 36 7* 37   YOLA  --  302* 571*           Current Medications:    acetylcysteine  2 mL Nebulization BID     albuterol  2.5 mg Nebulization Q6H     B and C vitamin Complex with folic acid  5 mL Oral or Feeding Tube Daily     budesonide  0.5 mg Nebulization BID     fiber modular (NUTRISOURCE FIBER)  1 packet Per Feeding Tube Daily     [Held by  provider] heparin ANTICOAGULANT  5,000 Units Subcutaneous Q8H     insulin aspart  1-12 Units Subcutaneous Q4H     insulin glargine  10 Units Subcutaneous QAM AC     metoprolol tartrate  50 mg Oral or Feeding Tube BID     OLANZapine  10 mg Oral or Feeding Tube Q24H     pantoprazole  40 mg Oral QAM AC     posaconazole  300 mg Per G Tube TID     predniSONE  2.5 mg Oral or Feeding Tube QPM     predniSONE  5 mg Oral or Feeding Tube QAM     protein modular  1 packet Per Feeding Tube BID     sodium chloride  3 mL Nebulization BID     sulfamethoxazole-trimethoprim  10 mL Oral or Feeding Tube Once per day on Mon Wed Fri     tacrolimus  2 mg Oral or Feeding Tube Q24H     tacrolimus  2.5 mg Oral or Feeding Tube Q24H     valGANciclovir  450 mg Oral Once per day on Mon Thu       dextrose Stopped (02/16/21 9654)     sodium chloride 10 mL/hr at 02/08/21 0400

## 2021-02-20 NOTE — PROGRESS NOTES
HEMODIALYSIS TREATMENT NOTE    Date: 2/19/2021  Time: 7.30 PM    Data:  Pre Wt: 56.1 kg (127 lb 3.3 oz)   Desired Wt: 53.1 kg   Post Wt: 53.1 kg (120 lb 9.5 oz)(Estimated)  Weight change: 3 kg  Ultrafiltration - Post Run Net Total Removed : 3000 mL  Vascular Access Status: Fistula  patent  Dialyzer Rinse: Clear  Total Blood Volume Processed:75.8  L   Total Dialysis (Treatment) Time: 3.0 hours  Dialysate Bath: K 3, Ca 2.25  Heparin 500 units loading + 500 units/hr    Lab:   No    Assessment / Interventions: 3.0 hours HD via  RAVF, 2 15 g needle cannulated.  with good flow. Epogen given during dialysis. Monitoring every 10 minutes, b/p soft but stable. 3.0 kg UF pulled, no issues & pt completed her treatment time. Blood rinsed back, Fistula hemostasis achieved in less than 10 minutes & hand off report given         Plan:    Cont. With Renal Team.

## 2021-02-20 NOTE — PLAN OF CARE
Major Shift Events:  Pt did well overnight. No complaints of pain, pt slept well. Pt had 2 loose stools incontinent both times, but continent of urine X1. Trach cares completed.   Plan: If pt continues to do well will transfer out of ICU or transfer out of hospital to rehab facility.   For vital signs and complete assessments, please see documentation flowsheets.    Problem: Adult Inpatient Plan of Care  Goal: Plan of Care Review  Outcome: No Change

## 2021-02-21 ENCOUNTER — APPOINTMENT (OUTPATIENT)
Dept: PHYSICAL THERAPY | Facility: CLINIC | Age: 61
End: 2021-02-21
Attending: INTERNAL MEDICINE
Payer: COMMERCIAL

## 2021-02-21 PROBLEM — Z79.2 ADMINISTRATION OF LONG-TERM PROPHYLACTIC ANTIBIOTICS: Status: ACTIVE | Noted: 2019-09-13

## 2021-02-21 PROBLEM — N17.9 ACUTE KIDNEY INJURY (H): Status: ACTIVE | Noted: 2018-08-01

## 2021-02-21 PROBLEM — J86.9 EMPYEMA OF LUNG (H): Status: ACTIVE | Noted: 2019-09-13

## 2021-02-21 PROBLEM — H57.02 EPISODIC ANISOCORIA: Status: ACTIVE | Noted: 2021-02-21

## 2021-02-21 PROBLEM — B59: Status: ACTIVE | Noted: 2021-02-21

## 2021-02-21 LAB
ACID FAST STN SPEC QL: NORMAL
BACTERIA SPEC CULT: ABNORMAL
GLUCOSE BLDC GLUCOMTR-MCNC: 109 MG/DL (ref 70–99)
GLUCOSE BLDC GLUCOMTR-MCNC: 139 MG/DL (ref 70–99)
GLUCOSE BLDC GLUCOMTR-MCNC: 143 MG/DL (ref 70–99)
GLUCOSE BLDC GLUCOMTR-MCNC: 154 MG/DL (ref 70–99)
GLUCOSE BLDC GLUCOMTR-MCNC: 163 MG/DL (ref 70–99)
GLUCOSE BLDC GLUCOMTR-MCNC: 188 MG/DL (ref 70–99)
INTERPRETATION ECG - MUSE: NORMAL
LACTATE BLD-SCNC: 0.6 MMOL/L (ref 0.7–2)
SPECIMEN SOURCE: ABNORMAL
SPECIMEN SOURCE: NORMAL

## 2021-02-21 PROCEDURE — 90937 HEMODIALYSIS REPEATED EVAL: CPT

## 2021-02-21 PROCEDURE — 250N000013 HC RX MED GY IP 250 OP 250 PS 637: Performed by: NURSE PRACTITIONER

## 2021-02-21 PROCEDURE — 999N000043 HC STATISTIC CTO2 CONT OXYGEN TECH TIME EA 90 MIN

## 2021-02-21 PROCEDURE — 250N000013 HC RX MED GY IP 250 OP 250 PS 637: Performed by: STUDENT IN AN ORGANIZED HEALTH CARE EDUCATION/TRAINING PROGRAM

## 2021-02-21 PROCEDURE — 97530 THERAPEUTIC ACTIVITIES: CPT | Mod: GP

## 2021-02-21 PROCEDURE — 250N000009 HC RX 250: Performed by: NURSE PRACTITIONER

## 2021-02-21 PROCEDURE — 272N000078 HC NUTRITION PRODUCT INTERMEDIATE LITER

## 2021-02-21 PROCEDURE — 99233 SBSQ HOSP IP/OBS HIGH 50: CPT | Performed by: NURSE PRACTITIONER

## 2021-02-21 PROCEDURE — 250N000013 HC RX MED GY IP 250 OP 250 PS 637: Performed by: INTERNAL MEDICINE

## 2021-02-21 PROCEDURE — 99233 SBSQ HOSP IP/OBS HIGH 50: CPT | Performed by: INTERNAL MEDICINE

## 2021-02-21 PROCEDURE — 634N000001 HC RX 634: Performed by: INTERNAL MEDICINE

## 2021-02-21 PROCEDURE — 97110 THERAPEUTIC EXERCISES: CPT | Mod: GP

## 2021-02-21 PROCEDURE — 258N000003 HC RX IP 258 OP 636: Performed by: INTERNAL MEDICINE

## 2021-02-21 PROCEDURE — 94640 AIRWAY INHALATION TREATMENT: CPT

## 2021-02-21 PROCEDURE — 36415 COLL VENOUS BLD VENIPUNCTURE: CPT | Performed by: NURSE PRACTITIONER

## 2021-02-21 PROCEDURE — 250N000009 HC RX 250: Performed by: STUDENT IN AN ORGANIZED HEALTH CARE EDUCATION/TRAINING PROGRAM

## 2021-02-21 PROCEDURE — 250N000012 HC RX MED GY IP 250 OP 636 PS 637: Performed by: INTERNAL MEDICINE

## 2021-02-21 PROCEDURE — 250N000011 HC RX IP 250 OP 636: Performed by: NURSE PRACTITIONER

## 2021-02-21 PROCEDURE — 250N000009 HC RX 250

## 2021-02-21 PROCEDURE — 999N000157 HC STATISTIC RCP TIME EA 10 MIN

## 2021-02-21 PROCEDURE — 120N000003 HC R&B IMCU UMMC

## 2021-02-21 PROCEDURE — 999N001017 HC STATISTIC GLUCOSE BY METER IP

## 2021-02-21 PROCEDURE — 83605 ASSAY OF LACTIC ACID: CPT | Performed by: NURSE PRACTITIONER

## 2021-02-21 PROCEDURE — 94668 MNPJ CHEST WALL SBSQ: CPT

## 2021-02-21 PROCEDURE — 99291 CRITICAL CARE FIRST HOUR: CPT | Mod: GC | Performed by: INTERNAL MEDICINE

## 2021-02-21 PROCEDURE — 94640 AIRWAY INHALATION TREATMENT: CPT | Mod: 76

## 2021-02-21 RX ORDER — GUAR GUM
1 PACKET (EA) ORAL 2 TIMES DAILY
Status: DISCONTINUED | OUTPATIENT
Start: 2021-02-21 | End: 2021-02-25 | Stop reason: HOSPADM

## 2021-02-21 RX ORDER — LIDOCAINE HYDROCHLORIDE 10 MG/ML
INJECTION, SOLUTION EPIDURAL; INFILTRATION; INTRACAUDAL; PERINEURAL
Status: COMPLETED
Start: 2021-02-21 | End: 2021-02-21

## 2021-02-21 RX ADMIN — Medication 1 PACKET: at 08:12

## 2021-02-21 RX ADMIN — INSULIN GLARGINE 10 UNITS: 100 INJECTION, SOLUTION SUBCUTANEOUS at 07:57

## 2021-02-21 RX ADMIN — APIXABAN 2.5 MG: 2.5 TABLET, FILM COATED ORAL at 20:23

## 2021-02-21 RX ADMIN — Medication 1 PACKET: at 20:27

## 2021-02-21 RX ADMIN — INSULIN ASPART 1 UNITS: 100 INJECTION, SOLUTION INTRAVENOUS; SUBCUTANEOUS at 07:57

## 2021-02-21 RX ADMIN — METOPROLOL TARTRATE 50 MG: 25 TABLET, FILM COATED ORAL at 08:10

## 2021-02-21 RX ADMIN — Medication 5 ML: at 08:11

## 2021-02-21 RX ADMIN — LIDOCAINE HYDROCHLORIDE 20 MG: 10 INJECTION, SOLUTION EPIDURAL; INFILTRATION; INTRACAUDAL; PERINEURAL at 13:39

## 2021-02-21 RX ADMIN — SODIUM CHLORIDE 250 ML: 9 INJECTION, SOLUTION INTRAVENOUS at 11:01

## 2021-02-21 RX ADMIN — PREDNISONE 2.5 MG: 2.5 TABLET ORAL at 20:24

## 2021-02-21 RX ADMIN — PREDNISONE 5 MG: 5 TABLET ORAL at 08:10

## 2021-02-21 RX ADMIN — ACETYLCYSTEINE 2 ML: 200 SOLUTION ORAL; RESPIRATORY (INHALATION) at 08:47

## 2021-02-21 RX ADMIN — TACROLIMUS 2.5 MG: 5 CAPSULE ORAL at 18:14

## 2021-02-21 RX ADMIN — Medication: at 11:02

## 2021-02-21 RX ADMIN — BUDESONIDE 0.5 MG: 0.5 INHALANT ORAL at 19:55

## 2021-02-21 RX ADMIN — TACROLIMUS 2 MG: 5 CAPSULE ORAL at 08:11

## 2021-02-21 RX ADMIN — OLANZAPINE 10 MG: 2.5 TABLET ORAL at 20:24

## 2021-02-21 RX ADMIN — HEPARIN SODIUM 5000 UNITS: 5000 INJECTION, SOLUTION INTRAVENOUS; SUBCUTANEOUS at 06:10

## 2021-02-21 RX ADMIN — Medication 1 PACKET: at 20:28

## 2021-02-21 RX ADMIN — EPOETIN ALFA-EPBX 3000 UNITS: 10000 INJECTION, SOLUTION INTRAVENOUS; SUBCUTANEOUS at 11:02

## 2021-02-21 RX ADMIN — ALBUTEROL SULFATE 2.5 MG: 2.5 SOLUTION RESPIRATORY (INHALATION) at 08:47

## 2021-02-21 RX ADMIN — ACETYLCYSTEINE 2 ML: 200 SOLUTION ORAL; RESPIRATORY (INHALATION) at 19:55

## 2021-02-21 RX ADMIN — INSULIN ASPART 1 UNITS: 100 INJECTION, SOLUTION INTRAVENOUS; SUBCUTANEOUS at 23:36

## 2021-02-21 RX ADMIN — POSACONAZOLE 300 MG: 40 SUSPENSION ORAL at 13:53

## 2021-02-21 RX ADMIN — ALBUTEROL SULFATE 2.5 MG: 2.5 SOLUTION RESPIRATORY (INHALATION) at 14:42

## 2021-02-21 RX ADMIN — Medication 40 MG: at 08:11

## 2021-02-21 RX ADMIN — POSACONAZOLE 300 MG: 40 SUSPENSION ORAL at 08:12

## 2021-02-21 RX ADMIN — HEPARIN SODIUM 5000 UNITS: 5000 INJECTION, SOLUTION INTRAVENOUS; SUBCUTANEOUS at 13:54

## 2021-02-21 RX ADMIN — ALBUTEROL SULFATE 2.5 MG: 2.5 SOLUTION RESPIRATORY (INHALATION) at 19:55

## 2021-02-21 RX ADMIN — POSACONAZOLE 300 MG: 40 SUSPENSION ORAL at 20:27

## 2021-02-21 RX ADMIN — METOPROLOL TARTRATE 50 MG: 25 TABLET, FILM COATED ORAL at 20:27

## 2021-02-21 RX ADMIN — ACETAMINOPHEN 325 MG: 325 TABLET, FILM COATED ORAL at 00:09

## 2021-02-21 RX ADMIN — ISODIUM CHLORIDE 3 ML: 0.03 SOLUTION RESPIRATORY (INHALATION) at 08:47

## 2021-02-21 RX ADMIN — INSULIN ASPART 2 UNITS: 100 INJECTION, SOLUTION INTRAVENOUS; SUBCUTANEOUS at 13:05

## 2021-02-21 RX ADMIN — SODIUM CHLORIDE 300 ML: 9 INJECTION, SOLUTION INTRAVENOUS at 11:01

## 2021-02-21 RX ADMIN — ALBUTEROL SULFATE 2.5 MG: 2.5 SOLUTION RESPIRATORY (INHALATION) at 01:01

## 2021-02-21 RX ADMIN — BUDESONIDE 0.5 MG: 0.5 INHALANT ORAL at 08:47

## 2021-02-21 RX ADMIN — INSULIN ASPART 1 UNITS: 100 INJECTION, SOLUTION INTRAVENOUS; SUBCUTANEOUS at 03:59

## 2021-02-21 RX ADMIN — ACETAMINOPHEN 325 MG: 325 TABLET, FILM COATED ORAL at 14:37

## 2021-02-21 ASSESSMENT — MIFFLIN-ST. JEOR: SCORE: 1097

## 2021-02-21 ASSESSMENT — ACTIVITIES OF DAILY LIVING (ADL)
ADLS_ACUITY_SCORE: 15

## 2021-02-21 NOTE — PROGRESS NOTES
HEMODIALYSIS TREATMENT NOTE    Date: 2/21/2021  Time: 1340    Data:  Pre Wt: 54.8 kg (120 lb 13 oz)   Desired Wt: 51.8 kg   Post Wt: 52.3 kg (115 lb 4.8 oz) estimated  Weight change: 2.5 kg  Ultrafiltration - Post Run Net Total Removed (mL): 2500 mL  Vascular Access Status: patent  Dialyzer Rinse: Light  Total Blood Volume Processed: 75.8 L   Total Dialysis (Treatment) Time: 3 hours     Lab: No    Assessment/Interventions:  3 hour HD run via left arm fistula/graft using 16G needles and lidocaine.  Blood flow 300 mL/min.  K3/Ca2.25 per protocol.  UFG decreased 2/2 elevated HR/A-flutter.  2.5L removed.  Pt given 3000 units Epogen during run.       Plan:  Continue with plan of care.  Next run per Renal Team.

## 2021-02-21 NOTE — PROGRESS NOTES
Transfer  Transferred from: 4C  Via:bed  Reason for transfer: Pt appropriate for 6B- improved patient condition  Family: Aware of transfer  Belongings: Received with pt  Chart: Received with pt  Medications: Meds received from old unit with pt  Code Status verified on armband: yes  2 RN Skin Assessment Completed By:  Med rec completed: yes  Bed surface reassessed with algorithm and charted: yes  New bed surface ordered: no    Report received from: Evelyn GIL   Pt status: Pt alert and oriented x4. VSS on 30% trach dome. TF off.

## 2021-02-21 NOTE — PROGRESS NOTES
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 Found to have PCP pneumonia. No s/p tracheostomy on trach dome.     CHANGES and MAJOR THINGS TODAY:     - Continue trach dome   - Pulmonary following: appreciate recommendation   - Discuss anticoagulation with transplant time (Apixaban?)  - Discuss downgrading Trach size for PMSV with IP    - Discharge planning to LTACH vs TCU, likely transfer to floor after iHD  - Daily fiber via PEG-J  Tube, increased to BID     PLAN:  Neuro:  # Pain and sedation  - Hydroxyzine prn for anxiety    # Delirium, resolved   # Encephalopathy, resolved   - Scheduled 10 mg at bedtime and PRN Zyprexa     # Insomnia  - Melatonin available prn    # Anisocoria (L>R)  Intermittently fixed and dilated pupils. CTH unremarkable. Neuro examination unremarkable.  - Opthomalogy evaluated on 2/1    Pulmonary:  # Acute Hypoxic Respiratory Failure  # Tracheostomy (2/12/21)  2/2 PJP initially. OSH CT 1/23 showed bilateral ground glass opacities and consolidative lung infiltrates centrally distributed to the upper lobes and RLL. COVID negative x3 at outside hospital. Concern for secondary pneumonia given worsening clinical picture. 2/4 CT chest indicated worsening consolidations consistent with progression of pneumocystis with possibilty of superimposed secondary pneumonia. Previously on inhaled epoprostenol. Completed course of bactrim. Now s/p perc tracheostomy 2/12 with interventional pulm and R mainstem dilation  today 2/19 2/19.   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  - Positive PJP PCR on 1/28 with positive fungitel   - Continue PTA tacrolimus   - Continue VGCV for CMV ppx through 2/24   - Repeat EBV DNA 2/22   - Started home dose Prednisone 2/18, completed taper dose   - Pulmonary lung transplant team consulted and following, appreciate recs    - R mainstem dilation done on 2/19   - Continue enteral Posaconazole 300 mg TID (increased 2/17), repeat level 2/23 (ordered), recommend monitoring LFTs and QTc      Cardiovascular:  # Shock, resolved  # Transient hypotension related to dialysis, sleep- Resolved  Last ECHO 10/21/20 showed EF 60-65%, normal LV and RV function.     # Afib with RVR, resolved  # ST  Intermittently going in and out of A-fib, currently ST.  - Metoprolol 50mg BID  - Discuss anticoagulation with transplant team     # Hx HTN  - Hold PTA carvedilol and amlodipine    GI/Nutrition:  # Portal HTN  Liver biopsy positive for congestive hepatopathy. Previous had ascites thought to be r/t elevated right sided heart pressures. Last saw GI on 12/21/20 and patient endorsed that her ascites is no longer present. Etiology unclear.   - NTD    # Nutrition  - IR PEG-J tube placement 2/16, currently on TF  - Daily fiber via PEG-J  Tube, increased to BID      # Constipation-resolved and now with loose stools  - Hx of constipation with bactrim, reports frequent BMs  - Change schedule bowel regimen to PRN, due to multiple loose stools      Renal/Fluids/Electrolytes:  # ESRD on iHD twice weekly(M/Th)  # Anion gap metabolic acidosis r/t ESRD, uremia, resolved  Outpatient dry body weight 56.5 kg. CRRT 2/4 to 2/8. Last HD 2/11 for 2L off   - Nephrology following   - iHD per nephrology   - I&O  - Daily weights   - Trend BMP     # Hypomagnesemia- resolved  - Replace Mag x1    Endocrine:  # Hyperglycemia, steroid induced  - High intensity sliding scale insulin    # Seronegative RA with Raynaud's   - NTD, monitor     ID:  # Sepsis in setting of PJP pneumonia, possible secondary pneumonia  Suspected related to PJP pneumonia. OSH CT 1/23 + bilateral ground glass opacities and consolidative lung infiltrates centrally distributed to the upper lobes and RLL. Covid-19 negative X3 at OSH. Procalcitonin negative. Urine strep, CMV, and RPP negative. Given acute and severe decompensation, concern for secondary pneumonia. 2/4 CT chest which displayed worsening consolidations consistent with progression of pneumocystis with the possibility of superimposed secondary pneumonia. Completed 21 day course of bactrim and 10 day course of zosyn. Now tolerating trach dome well.   - PJP PCR positive, fungitell positive as well  - Weekly COVID test per hospital policy, next 2/19    # Chronic/Stable right aspergillus empyema   - Continue posaconazole    Hematology:    # Anemia r/t renal disease, chronic stable   # Thrombocytopenia r/t critical illness  # Leukocytosis, infection/steroids  Takes Aranesp 25 mcg every four weeks, last dose 1/14, next scheduled was 2/11.   Currently receiving epoetin alisha-epox with dialysis   - Discussed with nephrology, will add replacement with HD run on 2/13   - iHD today    Musculoskeletal:  # Generalized weakness  - PT/OT    Skin:  # Dermatomyositis antitryptase syndrome   - Noted, NTD    General Cares/Prophylaxis:    DVT Prophylaxis: Heparin subcutaneous (will hold for procedure)   GI P rophylaxis: PPI  Restraints: none  Family Communication:   updated at the bedside  Code Status: full    Lines/tubes/drains:  - Trach  - HD fistula left forearm    Disposition:  - Medical ICU, will discuss disposition with transplant team     Patient seen and findings/plan discussed with medical ICU staff, Dr. Jean.    Luis Angel Quinones MD  Internal Medicine, PGY-1  [facility]  Pager: [phone]    ====================================  INTERVAL HISTORY:   No acute events overnight. Sleeping comfortably. No complaints.     OBJECTIVE:   1. VITAL SIGNS:   Temp:  [98.1  F (36.7  C)-98.6  F (37  C)] 98.3  F (36.8  C)  Pulse:  [] 91  Resp:  [12-30] 16  BP: (105-132)/(63-86) 127/73  FiO2 (%):  [30 %] 30 %  SpO2:  [90 %-98 %] 90 %  Ventilation Mode: Trach collar  FiO2 (%): 30 %  Oxygen Concentration (%): 30 %  Resp: 16    2. INTAKE/ OUTPUT:   I/O last 3 completed shifts:  In: 1660 [NG/GT:460]  Out: -  Urine Mixed with stool, unable to monitor accurately per nursing staff    3. PHYSICAL EXAMINATION:  General: Laying in bed, trachostomy, alert  Neuro: eyes open, following commands, alert and oriented x4  Pulm/Resp: coarse breath sounds bilaterally, breathing non-labored  CV: ST, pulses intact, no edema.   Abdomen: soft, non-distended, non-tender, bowel sounds active.   Incisions/Skin: left AV fistula with thrill and bruit    4. LABS:   Arterial Blood Gases   No lab results found in last 7 days.  Complete Blood Count   Recent Labs   Lab 02/20/21  0503 02/19/21  0458 02/18/21  0430 02/16/21  0612   WBC 2.5* 3.0* 3.2* 4.0   HGB 10.0* 9.0* 8.8* 8.7*    207 209 145*     Basic Metabolic Panel  Recent Labs   Lab 02/20/21  0503 02/19/21  0458 02/18/21  0430 02/16/21  0612   * 134 134 134   POTASSIUM 4.2 4.0 3.6 3.6   CHLORIDE 98 100 100 99   CO2 28 29 27 28   BUN 32* 46* 29 34*   CR 2.37* 3.14* 2.19* 2.27*   * 102* 158* 154*     Liver Function Tests  Recent Labs   Lab 02/20/21  0503 02/19/21  0458   AST 15  --    ALT 34  --    ALKPHOS 244*  --     BILITOTAL 0.4  --    ALBUMIN 2.4*  --    INR  --  0.99     Coagulation Profile  Recent Labs   Lab 02/19/21  0458   INR 0.99       5. RADIOLOGY:   No results found for this or any previous visit (from the past 24 hour(s)).

## 2021-02-21 NOTE — PROGRESS NOTES
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 Nephrology consutled for dialysis needs.     Interval History :   Mrs Bule had HD 2/19/21 with 3L UF.   I>O by 1.6 L yesterday and 0.8L already today  She remains hemodynamically stable off pressors.  Remains anuric.       Assessment & Recommendations:   ESRD-Runs at [facility] through [facility].  Has atypical HD schedule of Monday and Thursday, has AVF with partial graft which has been challenging to access per RN's.  Needed CRRT 2/5-2/8 due to hemodynamic instability but otherwise has run iHD.  Working on getting to stable 3x/week schedule, may be progressing to trach for resp failure.                   - HD  Today with 3L UF which will keep her even to modest net negative over 48 hours.         Volume status-Has mild abdominal edema but improving, will try for 3L again today but would not be surprised if we need to back off to 2L, will try to get standing wt if ambulatory this afternoon.       Electrolytes/pH-at target      Anemia- EPO 4k with HD.       Nutrition-Nutren TF.    Case Tan MD  Division of Nephrology and Hypertension  Pager: [phone]  February 21, 2021  8:31 AM        Review of Systems:   I reviewed the following systems:  Gen: No fevers or chills  CV: No CP at rest  Resp: No SOB at rest  GI: No N/V    Physical Exam:   I/O last 3 completed shifts:  In: 1660 [NG/GT:460]  Out: -    /76 (BP Location: Right arm)   Pulse 93   Temp 98.3  F (36.8  C) (Oral)   Resp 24   Ht 1.6 m (5' 2.99\")   Wt 54.8 kg (120 lb 13 oz)   LMP 06/07/2014 (Exact Date)   SpO2 95% "   BMI 21.41 kg/m       GENERAL APPEARANCE: Vent via trached.   Alert, communicative.  sitting comfortably in chair  EYES: no scleral icterus  Endo: no moon facies  Pulmonary: coarse nbilateral air sounds  CV: regular rhythm, normal rate - no rub  Edema 1+ dependent in upper thighs.    GI: soft, non distended  MS: no evidence of inflammation in joints, no muscle tenderness  SKIN: warm, dry,     NEURO: No focal deficits.     Labs:   All labs reviewed by me  Electrolytes/Renal -   Recent Labs   Lab Test 02/20/21  0503 02/19/21  0458 02/18/21  0430 02/15/21  0418 02/15/21  0418 02/14/21  0336 02/13/21  0323   * 134 134   < > 136 136 134   POTASSIUM 4.2 4.0 3.6   < > 3.9 3.8 4.2   CHLORIDE 98 100 100   < > 100 100 100   CO2 28 29 27   < > 27 29 26   BUN 32* 46* 29   < > 52* 32* 52*   CR 2.37* 3.14* 2.19*   < > 2.75* 1.84* 2.59*   * 102* 158*   < > 155* 149* 142*   CHELSEY 8.6 8.8 8.4*   < > 8.6 8.6 8.7   MAG  --   --   --   --  1.9 1.8 2.4*   PHOS  --   --   --   --  4.5 3.6 5.3*    < > = values in this interval not displayed.       CBC -   Recent Labs   Lab Test 02/20/21  0503 02/19/21 0458 02/18/21  0430   WBC 2.5* 3.0* 3.2*   HGB 10.0* 9.0* 8.8*    207 209       LFTs -   Recent Labs   Lab Test 02/20/21  0503 02/09/21  1615 02/09/21  0403   ALKPHOS 244* 218* 182*   BILITOTAL 0.4 0.4 0.3   ALT 34 32 28   AST 15 6 <3   PROTTOTAL 6.4* 6.1* 5.6*   ALBUMIN 2.4* 2.0* 1.8*       Iron Panel -   Recent Labs   Lab Test 02/03/21  0415 12/13/18  1033 08/01/18  0921   IRON 51 16* 93   IRONSAT 36 7* 37   YOLA  --  302* 571*           Current Medications:    acetylcysteine  2 mL Nebulization BID     albuterol  2.5 mg Nebulization Q6H     B and C vitamin Complex with folic acid  5 mL Oral or Feeding Tube Daily     budesonide  0.5 mg Nebulization BID     fiber modular (NUTRISOURCE FIBER)  1 packet Per Feeding Tube Daily     heparin ANTICOAGULANT  5,000 Units Subcutaneous Q8H     insulin aspart  1-12 Units Subcutaneous  Q4H     insulin glargine  10 Units Subcutaneous QAM AC     metoprolol tartrate  50 mg Oral or Feeding Tube BID     OLANZapine  10 mg Oral or Feeding Tube Q24H     pantoprazole  40 mg Oral QAM AC     posaconazole  300 mg Per G Tube TID     predniSONE  2.5 mg Oral or Feeding Tube QPM     predniSONE  5 mg Oral or Feeding Tube QAM     protein modular  1 packet Per Feeding Tube BID     sodium chloride  3 mL Nebulization BID     sulfamethoxazole-trimethoprim  10 mL Oral or Feeding Tube Once per day on Mon Wed Fri     tacrolimus  2 mg Oral or Feeding Tube Q24H     tacrolimus  2.5 mg Oral or Feeding Tube Q24H     valGANciclovir  450 mg Oral Once per day on Mon Thu       dextrose Stopped (02/16/21 1644)     sodium chloride 10 mL/hr at 02/08/21 0400

## 2021-02-21 NOTE — PROGRESS NOTES
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  Patient was admitted to OSH on  for acute hypoxemic respiratory failure and new lung opacities. Intubated  and transferred to [facility] for ongoing management.  Extubated  but reintubated - for progressive hypoxemia.  Rapid decompensation /3 with ARDS presentation requiring reintubation, prone positioning, and inhaled epoprostenol, suspected d/t worsening PJP. S/p Trach , and PEG-J ().  S/p dilation of right main by IP on .  Has remained stable on TD exclusively since bronch.      Today's recommendations:  - Follow bronch cultures from   - Repeat CXR tomorrow (ordered)  - TD exclusively as tolerated  - Will discuss possible downsize of trach with IP on Monday  - Agree with transfer out of ICU  - Posaconazole level  (ordered)  - Tacro level  (ordered)  - Continue VGCV for CMV ppx through  (one week after steroid burst completion)  - Repeat EBV  (ordered)     Acute hypoxemic respiratory failure:   Right post-obstructive Stenotrophomonas and Eikenella pneumonia:  PJP pneumonia:  Septic shock, Resolved:   Airway stenosis with distal plugging s/p right mainstem dilation ():  ARDS: Presented to OSH ED with increased SOB and hypoxia, intubated  with need for pressors, transferred to [facility].  PTA bronch (20) with moderate airway obstruction and RML/RLL mucous plugging, cultures with Stenotrophomonas and  Eikenella.  OSH CT chest with new multifocal GGO bilaterally and increased left loculated pleural effusion.  Repeat bronch (1/24) with RUL BAL with thick copious secretions to RML/RLL, PJP PCR positive.  Extubated on 1/26, but reintubated from 1/27-1/29 for progressive hypoxemia.  Continued to require significant PEEP, eventually decompensated on 2/3 and again reintubated.  Presentation consistent with ARDS (tx: prone positioning, inhaled epoprostenol, pressors).  CT chest (2/3) with progressive consolidation and possible superimposed infection findings, stable LLL chronic empyema.  Bronch on 2/11 with persistent right mainstem stenosis with moderate airway obstruction.  S/p trach 2/12 by IP.  S/p steroid burst with taper through 2/17 and 21 day Bactrim course for PJP through 2/15.  Hypoxia improving, TD exclusively since 2/19.  - Bronch cultures (2/19) with Staph epidermidis, continue to follow  - TD exclusively as tolerated  - SLP following for PMSV trials and eventual swallowing studies  - Chest physiotherapy QID and nebs: albuterol QID, Mucomyst BID, and Pulmicort BID  - Repeat CXR tomorrow  - Will discuss possible downsize of current Shiley #8 trach with IP on Monday, as this will be beneficial for pt's SLP progress  - Repeat IP bronch in 6 weeks (~4/2)  - Agree with transfer out of ICU     Aspergillus empyema (left-sided): Noted 10/8/19, negative in November and again on admission.  CT scan on 7/17/20 with increased mass-like density, likely pleural-based, in LLL area s/p needle aspiration (8/13/20) with Aspergillus fumigatus on cultures s/p intrapleural amphotericin bead placement (11/20).  Follows with ID as OP.  LFTs stable on admission (ALP chronically mildly elevated).  CT chest with increased loculated left pleural effusion s/p thoracentesis (1/25), exudative with 87% neutrophils, very high LDL (6308), cytology normal, AFB pleural cultures NGTD-stain negative.  - Posaconazole IV (transitioned from PO 2/9  d/t subtherapeutic level).  Repeat level (2/16) remains subtherapeutic, increased to TID dosing (2/18).  Repeat level 2/25 (ordered).  Plan for indefinite course.     S/p bilateral lung transplant for ILD 2/2 CTD: Last seen in pulmonary clinic 12/2. DSA (1/25) not detected.     Immunosuppression: On 2 drug IST d/t recurrent infections  - Tacrolimus 2 mg qAM/ 2.5 mg qPM.  Goal level 8-10. Repeat level 2/22 (ordered).  - Chronic prednisone 5 mg qAM / 2.5 mg qPM (transitioned from burst steroids 2/18)     Prophylaxis:   - Bactrim qMWF for PJP ppx     H/o CMV viremia: CMV D+/R+.  CMV <137 (1/25).  - Continue VGCV for CMV ppx through 2/24 (one week after steroid burst completion)     EBV viremia: Level 12/9/20 mildly elevated to 10K (log 4) from prior 3K (3.5 log) on 9/9/20, repeat (1/25/21) decreased to 5619 copies (log of 3.8).  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 Chronic iHD M/Th via AVF with partial graft.  Not able to tolerate iHD 2/3 d/t hypotension, line placed and transitioned to CRRT 2/4. Now back to iHD, qMWF schedule.  - Dialysis management per nephrology     Atrial fibrillation w/ RVR:  H/o DVT:  H/o paroxysmal AF, most recent recurrence 1/30 with RVR (-140s), likely in the setting of acute pulmonary illness.  - Management per MICU     Oropharyngeal candidiasis: White tongue plaque noted 2/1.  - Nystatin QID (2/1)      We appreciate the excellent care provided by the MICU team. Recommendations communicated via phone and this note. Will continue to follow along closely, please do not hesitate to call with any questions or concerns.      Patient discussed with Dr. Macias.    Hina Huff, DNP, APRN, CNP  Inpatient Nurse Practitioner  Pulmonary CF/Transplant     Subjective & Interval History:     Remains exclusively on TD at 30%, flow rate decreased from 40 to 10 LPM.  Heavy secretions noted overnight requiring repeat deep  "suctioning, though pt. denies distress during these episodes.  PMSV trial yesterday for 5\", limited by fatigue.      Review of Systems:     C: No fever, no chills  INTEGUMENTARY/SKIN: No rash or obvious new lesions  ENT/MOUTH: No sore throat, no sinus pain, no nasal congestion or drainage  RESP: See interval history  CV: No chest pain, no palpitations, no peripheral edema, no orthopnea  GI: No nausea, no vomiting, no reflux symptoms  : No dysuria  MUSCULOSKELETAL: No myalgias, no arthralgias  ENDOCRINE: Blood sugars with adequate control  NEURO: + occ headache, no numbness or tingling  PSYCHIATRIC: Mood stable    Physical Exam:     Vital signs:  Temp: 98.3  F (36.8  C) Temp src: Oral BP: 120/75 Pulse: 93   Resp: 24 SpO2: 97 % O2 Device: Trach dome Oxygen Delivery: 10 LPM Height: 160 cm (5' 2.99\") Weight: 54.8 kg (120 lb 13 oz)  I/O:     Intake/Output Summary (Last 24 hours) at 2/21/2021 1132  Last data filed at 2/21/2021 0900  Gross per 24 hour   Intake 1625 ml   Output --   Net 1625 ml     Constitutional: Sitting up in bed,  at bedside, in no apparent distress.   HEENT: Eyes with pink conjunctivae, anicteric. Oral mucosa moist without lesions. Trach in place.  PULM: Mildly diminished air flow bilaterally. Coarse t/o, no rhonchi, no wheezes. Non-labored breathing on TD 30% 10LPM.  CV: Normal S1 and S2. RRR. No murmur, gallop, or rub. No peripheral edema.   ABD: NABS, soft, nontender, nondistended.  PEG/J site not visualized.  MSK: Moves all extremities.   NEURO: Alert and oriented, conversant by mouthing words.   SKIN: Warm, dry. No rash on limited exam.   PSYCH: Mood stable.     Lines, Drains, and Devices:  Peripheral IV 02/16/21 Right;Posterior Lower forearm (Active)   Site Assessment WDL 02/21/21 0800   Line Status Saline locked;Checked every 1-2 hour 02/21/21 0800   Phlebitis Scale 0-->no symptoms 02/21/21 0800   Infiltration Scale 0 02/21/21 0800   Infiltration Site Treatment Method  None 02/21/21 0800 "   Number of days: 5       Peripheral IV 02/19/21 Right;Anterior Upper forearm (Active)   Site Assessment [facility] 02/21/21 0800   Line Status Saline locked;Checked every 1-2 hour 02/21/21 0800   Phlebitis Scale 0-->no symptoms 02/21/21 0800   Infiltration Scale 0 02/21/21 0800   Infiltration Site Treatment Method  None 02/21/21 0800   Number of days: 2       CVC Double Lumen 10/25/19 Right Internal jugular (Active)   Number of days: 485       Hemodialysis Vascular Access Arteriovenous fistula Left Arm (Active)   Site Assessment [facility] 02/21/21 1045   Cannulation Needle Size 16 02/21/21 1045   Dressing Intervention New dressing 02/19/21 1915   Dressing Status Clean, dry, intact 02/21/21 0800   Verification by x-ray Not applicable 02/21/21 0800   Hand Off Report Yes 02/19/21 1915   Number of days: 27     Data:     LABS    CMP:   Recent Labs   Lab 02/20/21  0503 02/19/21  0458 02/18/21  0430 02/16/21  0612 02/15/21  0418   * 134 134 134 136   POTASSIUM 4.2 4.0 3.6 3.6 3.9   CHLORIDE 98 100 100 99 100   CO2 28 29 27 28 27   ANIONGAP 6 5 8 7 8   * 102* 158* 154* 155*   BUN 32* 46* 29 34* 52*   CR 2.37* 3.14* 2.19* 2.27* 2.75*   GFRESTIMATED 22* 16* 24* 23* 18*   GFRESTBLACK 25* 18* 28* 27* 21*   CHELSEY 8.6 8.8 8.4* 8.9 8.6   MAG  --   --   --   --  1.9   PHOS  --   --   --   --  4.5   PROTTOTAL 6.4*  --   --   --   --    ALBUMIN 2.4*  --   --   --   --    BILITOTAL 0.4  --   --   --   --    ALKPHOS 244*  --   --   --   --    AST 15  --   --   --   --    ALT 34  --   --   --   --      CBC:   Recent Labs   Lab 02/20/21  0503 02/19/21  0458 02/18/21  0430 02/16/21  0612   WBC 2.5* 3.0* 3.2* 4.0   RBC 3.13* 2.96* 2.87* 2.87*   HGB 10.0* 9.0* 8.8* 8.7*   HCT 31.5* 29.3* 28.3* 27.4*   * 99 99 96   MCH 31.9 30.4 30.7 30.3   MCHC 31.7 30.7* 31.1* 31.8   RDW 21.5* 21.4* 21.3* 21.1*    207 209 145*       INR:   Recent Labs   Lab 02/19/21  0458   INR 0.99       Glucose:   Recent Labs   Lab 02/21/21  1151  02/21/21  0358 02/20/21  2349 02/20/21  1937 02/20/21  1611 02/20/21  1228 02/20/21  0503 02/20/21  0503 02/19/21  0458 02/19/21  0458 02/18/21  0430 02/18/21  0430 02/16/21  0612 02/16/21  0612 02/15/21  0418 02/15/21  0418   GLC  --   --   --   --   --   --   --  154*  --  102*  --  158*  --  154*  --  155*   * 154* 147* 107* 132* 128*   < >  --    < >  --    < >  --    < >  --    < >  --     < > = values in this interval not displayed.       Blood Gas: No lab results found in last 7 days.    Culture Data   Recent Labs   Lab 02/19/21  0853   CULT Moderate growth  Staphylococcus epidermidis  *  Susceptibility testing in progress  Culture received and in progress.  Positive AFB results are called as soon as detected.    Final report to follow in 7 to 8 weeks.    Assayed at [facility] [address] [place] [id number] [phone]  Culture negative after 1 hour  PENDING       Virology Data:   Lab Results   Component Value Date    FLUAH1 Not Detected 01/24/2021    FLUAH3 Not Detected 01/24/2021    GO5719 Not Detected 01/24/2021    IFLUB Not Detected 01/24/2021    RSVA Not Detected 01/24/2021    RSVB Not Detected 01/24/2021    PIV1 Not Detected 01/24/2021    PIV2 Not Detected 01/24/2021    PIV3 Not Detected 01/24/2021    HMPV Not Detected 01/24/2021    HRVS Negative 01/24/2021    ADVBE Negative 01/24/2021    ADVC Negative 01/24/2021    ADVC Negative 12/23/2020    ADVC Negative 10/07/2019       Historical CMV results (last 3 of prior testing):  Lab Results   Component Value Date    CMVQNT <137 (A) 01/25/2021    CMVQNT 238 (A) 01/24/2021    CMVQNT 675 (A) 12/23/2020     Lab Results   Component Value Date    CMVLOG <2.1 01/25/2021    CMVLOG 2.4 (H) 01/24/2021    CMVLOG 2.8 (H) 12/23/2020       Urine Studies    Recent Labs   Lab Test 01/24/21  1729 10/21/19  2240   URINEPH 5.0 5.0   NITRITE Negative Negative   LEUKEST Moderate* Large*   WBCU 34* 115*       Most Recent [place]ze Pulmonary Function  Testing (FVC/FEV1 only)  FVC-Pre   Date Value Ref Range Status   12/09/2020 1.12 L    09/09/2020 1.56 L    03/09/2020 1.57 L    02/19/2020 1.78 L      FVC-%Pred-Pre   Date Value Ref Range Status   12/09/2020 34 %    09/09/2020 47 %    03/09/2020 48 %    02/19/2020 54 %      FEV1-Pre   Date Value Ref Range Status   12/09/2020 0.98 L    09/09/2020 1.10 L    03/09/2020 0.96 L    02/19/2020 0.99 L      FEV1-%Pred-Pre   Date Value Ref Range Status   12/09/2020 37 %    09/09/2020 42 %    03/09/2020 37 %    02/19/2020 38 %        IMAGING    No results found for this or any previous visit (from the past 48 hour(s)).

## 2021-02-21 NOTE — PROGRESS NOTES
Transferred to: 6B at 17:05  Status at time of transfer: A & O on 1L, blow-by T-piece O2  Belongings: most on cart taken up by pt's , TF and pump sent with house orderly, Hiflow device taken by RT, trach supplies and meds in bed with patient  Basilio removed? (if no, why?): N/A  Chart and medications: chart on belonging cart and medications in bed with patient  Family notified:  present and accompanied patient during the transfer

## 2021-02-21 NOTE — PLAN OF CARE
"Major Shift Events:  Pt rested well overnight. Pt had some heavy secretions overnight, deep suctioned X3. Pt oliguric, no output this shift. Pt complained of \"small\" headache tylenol given with good response.   Plan: Discussion of exchanging trach for smaller size, no schedule yet.   For vital signs and complete assessments, please see documentation flowsheets.    Problem: Adult Inpatient Plan of Care  Goal: Plan of Care Review  Outcome: Improving     "

## 2021-02-21 NOTE — PLAN OF CARE
SLP session cancelled: patient politely declined participation with SLP due to fatigue and having dialysis completed. SLP will f/u tomorrow for PMSV tolerance, ideally after trach downsize if medically appropriate.

## 2021-02-21 NOTE — PROGRESS NOTES
Hospitalist Progress Note 2021    Kecia Blue,  1960, MRN [id number]  Code status: Full Code  Date of Admission: 2021    ASSESSMENT / PLAN   Discussion: Kecia is a 60 year old female who has past medical history of HTN, ESRD on dialysis, ILD with antsynthetase syndrome s/p bilateral lung transplant 2018.  Postoperative course was complicated by right mainstem bronchial stenosis requiring several dilations, left-sided aspergillus empyema in , EBV viremia, CMV viremia, and ESRD on HD Monday/Thursday.  Kecia was admitted to an outside hospital on  with acute hypoxemic respiratory failure and new lung opacities on imaging, 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  Respiratory status worsened, and she was intubated and transferred to [facility] on .  She had decompensation to ARDS on 2/3.  Now s/p tracheostomy on  and PEG-J on .  Also s/p dilation of right main bronchus on .  Stable on trach dome after bronchoscopy and now ready to transfer to medicine service.    #acute hypoxemic respiratory failure  #sepsis in setting of PJP pneumonia, possible secondary pneumonia  #s/p tracheostomy placement (21)  Presented to outside hospital as mentioned above with acute hypoxemic respiratory failure secondary to PJP pneumonia.  She failed extubation trials x2, developed ARDS.  Required proning, inhaled epoprostenol, pressors.  Then required tracheostomy, which was placed on .  Also completed a course of steroids with a taper through  and course of Bactrim X 21 days.  Bronchoscopy on  and requiring only trach dome since that time.    -Cultures from bronchoscopy on  growing staph epi.  Continuing to follow per pulmonary.    -Continuing chest physiotherapy and nebs: Albuterol, Mucomyst, Pulmicort    -CXR in a.m.  (ordered)    -Possible downsize of current trach.  Ongoing discussion with pulmonary - temporary plan mentioned to downsize on .  Patient currently has #8  Katty in place.    -Pulmonary recommends repeat bronchoscopy in 6 weeks (~4/2)    -Work with speech therapy, nataly shakir valve trials    #s/p bilateral lung transplant  ILD with antisynthetase sydrome s/p BSLT 03/2018 (CMV D+/R+, EBV D+/R+) postoperatively complicated by right mainstem bronchial stenosis s/p several dilations (with 80% narrowing at anastamosis on bronch on 2/11), 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    -Continue Tacro 2 mg every morning and 2.5 mg every evening.  Tacto level ordered for 2/22    -Prednisone 5 mg every morning and 2.5 mg every evening    -Continues on Bactrim every Monday, Wednesday, Friday for PJP ppx    -Continue VGCV for CMV ppx through 2/24    -posaconazole continued for chronic aspergillus empyema, dose recently increased    #ESRD  Prior to hospitalization, HD biweekly. Required CRRT 2/4 to 2/8.  Then transition to intermittent HD.  Access is AVF with partial graft, challenging to access.     -Orders per nephrology team.  Probable dialysis from 2/22    -I/O    -Daily weights    -Avoid nephrotoxic medications    -Receives Epogen with HD      Additional medical concerns:    #atrial fibrillation: continue metoprolol  #hx anxiety: bedtime and as needed olanzapine, hydroxyzine prn  #hx anisocoria: no deficits on exam, negative CTH. Monitor.  #hx HTN: holding pta meds  #seronegative RA with Raynaud's: stable. Monitor.    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 Ready to transfer out of ICU to intermediate care floor. Denies any pain right now. Assessed while sitting up in chair, appears comfortable. Stable on trach dome. Plans to transfer to unit 6B    ROS:  A four-point review of systems was negative with exception of those listed above.    Physical Exam  /65   Pulse 97   Temp 98.4  F (36.9  C) (Oral)   Resp 16   Ht 1.6 m (5' 2.99\")   Wt 54.8 kg (120 lb 13 oz)   LMP 06/07/2014 (Exact Date)   SpO2 96%   BMI 21.41 kg/m    Physical Exam  Constitutional:       General: She is not in acute distress.     Appearance: Normal appearance. She is ill-appearing. She is not diaphoretic.   HENT:      Head: Normocephalic and atraumatic.      Nose: Nose normal.      Mouth/Throat:      Mouth: Mucous membranes are moist.   Neck:      Trachea: Tracheostomy present.   Cardiovascular:      Rate and Rhythm: Regular rhythm. Tachycardia present.   Pulmonary:      Effort: No respiratory distress.      Breath sounds: No stridor. Rhonchi and rales present.   Abdominal:      General: There is no distension.   Skin:     General: Skin is warm and dry.   Neurological:      General: No focal deficit present.      Mental Status: She is alert.       Pertinent Labs   Recent Labs   Lab 02/20/21  0503 02/19/21  0458   * 134   CO2 28 29   BUN 32* 46*   ALBUMIN 2.4*  --    ALKPHOS 244*  --    ALT 34  --    AST 15  --      Recent Labs   Lab 02/20/21  0503 02/19/21  0458   WBC 2.5* 3.0*   HGB 10.0* 9.0*   HCT 31.5* 29.3*    207     Lab Results: personally reviewed.     Imaging  Plan repeat CXR in AM 2/22     Discharge plan: to LTACH vs TCU     Total time spent in direct patient care and management is greater than 20 minutes with more than 50% of time spent in coordination of care including time with the patient in face to face contact as well as " coordination with staff, review of previous medical records.    No-charge visit.    ADRIÁN Irene, CNP, [facility]-  Hospital Medicine Team  [facility]

## 2021-02-21 NOTE — PLAN OF CARE
ICU End of Shift Summary. See flowsheets for vital signs and detailed assessment.    Changes this shift: Afebrile, VSS.  Continues to do well on Trach Dome, switched from vent to hiflow device today. 30% at 40 lpm.  Patient has a good productive cough and medium secretions.  Worked with speech, approved short 5min. PM workouts with nursing supervising.  Tried this evening to speak with grandkids via ipad - did well but tired quickly.  Transplant would like to downgrade her trach to a 6, will discuss with Interventional Pulm.    Plan: [place], support POC overnight.

## 2021-02-22 ENCOUNTER — APPOINTMENT (OUTPATIENT)
Dept: SPEECH THERAPY | Facility: CLINIC | Age: 61
End: 2021-02-22
Attending: INTERNAL MEDICINE
Payer: COMMERCIAL

## 2021-02-22 ENCOUNTER — APPOINTMENT (OUTPATIENT)
Dept: PHYSICAL THERAPY | Facility: CLINIC | Age: 61
End: 2021-02-22
Attending: INTERNAL MEDICINE
Payer: COMMERCIAL

## 2021-02-22 ENCOUNTER — APPOINTMENT (OUTPATIENT)
Dept: GENERAL RADIOLOGY | Facility: CLINIC | Age: 61
End: 2021-02-22
Attending: NURSE PRACTITIONER
Payer: COMMERCIAL

## 2021-02-22 LAB
ANION GAP SERPL CALCULATED.3IONS-SCNC: 5 MMOL/L (ref 3–14)
BACTERIA SPEC CULT: NORMAL
BACTERIA SPEC CULT: NORMAL
BASOPHILS # BLD AUTO: 0 10E9/L (ref 0–0.2)
BASOPHILS NFR BLD AUTO: 1 %
BUN SERPL-MCNC: 38 MG/DL (ref 7–30)
CALCIUM SERPL-MCNC: 8.8 MG/DL (ref 8.5–10.1)
CHLORIDE SERPL-SCNC: 99 MMOL/L (ref 94–109)
CO2 SERPL-SCNC: 27 MMOL/L (ref 20–32)
CREAT SERPL-MCNC: 2.53 MG/DL (ref 0.52–1.04)
DIFFERENTIAL METHOD BLD: ABNORMAL
EOSINOPHIL # BLD AUTO: 0.1 10E9/L (ref 0–0.7)
EOSINOPHIL NFR BLD AUTO: 3.7 %
ERYTHROCYTE [DISTWIDTH] IN BLOOD BY AUTOMATED COUNT: 21.2 % (ref 10–15)
FUNGUS SPEC CULT: NORMAL
FUNGUS SPEC CULT: NORMAL
GFR SERPL CREATININE-BSD FRML MDRD: 20 ML/MIN/{1.73_M2}
GLUCOSE BLDC GLUCOMTR-MCNC: 122 MG/DL (ref 70–99)
GLUCOSE BLDC GLUCOMTR-MCNC: 126 MG/DL (ref 70–99)
GLUCOSE BLDC GLUCOMTR-MCNC: 146 MG/DL (ref 70–99)
GLUCOSE BLDC GLUCOMTR-MCNC: 148 MG/DL (ref 70–99)
GLUCOSE BLDC GLUCOMTR-MCNC: 152 MG/DL (ref 70–99)
GLUCOSE BLDC GLUCOMTR-MCNC: 88 MG/DL (ref 70–99)
GLUCOSE SERPL-MCNC: 136 MG/DL (ref 70–99)
HCT VFR BLD AUTO: 31.1 % (ref 35–47)
HGB BLD-MCNC: 9.7 G/DL (ref 11.7–15.7)
IMM GRANULOCYTES # BLD: 0 10E9/L (ref 0–0.4)
IMM GRANULOCYTES NFR BLD: 0.3 %
LYMPHOCYTES # BLD AUTO: 0.6 10E9/L (ref 0.8–5.3)
LYMPHOCYTES NFR BLD AUTO: 19.3 %
Lab: NORMAL
MAGNESIUM SERPL-MCNC: 1.6 MG/DL (ref 1.6–2.3)
MCH RBC QN AUTO: 30.9 PG (ref 26.5–33)
MCHC RBC AUTO-ENTMCNC: 31.2 G/DL (ref 31.5–36.5)
MCV RBC AUTO: 99 FL (ref 78–100)
MONOCYTES # BLD AUTO: 0.4 10E9/L (ref 0–1.3)
MONOCYTES NFR BLD AUTO: 12.3 %
NEUTROPHILS # BLD AUTO: 1.9 10E9/L (ref 1.6–8.3)
NEUTROPHILS NFR BLD AUTO: 63.4 %
NRBC # BLD AUTO: 0 10*3/UL
NRBC BLD AUTO-RTO: 0 /100
PLATELET # BLD AUTO: 241 10E9/L (ref 150–450)
POTASSIUM SERPL-SCNC: 4.7 MMOL/L (ref 3.4–5.3)
RBC # BLD AUTO: 3.14 10E12/L (ref 3.8–5.2)
SODIUM SERPL-SCNC: 131 MMOL/L (ref 133–144)
SPECIMEN SOURCE: NORMAL
TACROLIMUS BLD-MCNC: 9.8 UG/L (ref 5–15)
TME LAST DOSE: NORMAL H
WBC # BLD AUTO: 3 10E9/L (ref 4–11)

## 2021-02-22 PROCEDURE — 99207 PR CDG-CUT & PASTE-POTENTIAL IMPACT ON LEVEL: CPT | Performed by: INTERNAL MEDICINE

## 2021-02-22 PROCEDURE — 99233 SBSQ HOSP IP/OBS HIGH 50: CPT | Performed by: INTERNAL MEDICINE

## 2021-02-22 PROCEDURE — 250N000009 HC RX 250: Performed by: STUDENT IN AN ORGANIZED HEALTH CARE EDUCATION/TRAINING PROGRAM

## 2021-02-22 PROCEDURE — 71045 X-RAY EXAM CHEST 1 VIEW: CPT

## 2021-02-22 PROCEDURE — 36415 COLL VENOUS BLD VENIPUNCTURE: CPT | Performed by: STUDENT IN AN ORGANIZED HEALTH CARE EDUCATION/TRAINING PROGRAM

## 2021-02-22 PROCEDURE — 87799 DETECT AGENT NOS DNA QUANT: CPT | Performed by: STUDENT IN AN ORGANIZED HEALTH CARE EDUCATION/TRAINING PROGRAM

## 2021-02-22 PROCEDURE — 97116 GAIT TRAINING THERAPY: CPT | Mod: GP

## 2021-02-22 PROCEDURE — 99207 PR APP CREDIT; MD BILLING SHARED VISIT: CPT | Performed by: STUDENT IN AN ORGANIZED HEALTH CARE EDUCATION/TRAINING PROGRAM

## 2021-02-22 PROCEDURE — 250N000013 HC RX MED GY IP 250 OP 250 PS 637: Performed by: NURSE PRACTITIONER

## 2021-02-22 PROCEDURE — 250N000013 HC RX MED GY IP 250 OP 250 PS 637: Performed by: STUDENT IN AN ORGANIZED HEALTH CARE EDUCATION/TRAINING PROGRAM

## 2021-02-22 PROCEDURE — 94640 AIRWAY INHALATION TREATMENT: CPT

## 2021-02-22 PROCEDURE — 99223 1ST HOSP IP/OBS HIGH 75: CPT | Performed by: INTERNAL MEDICINE

## 2021-02-22 PROCEDURE — 80197 ASSAY OF TACROLIMUS: CPT | Performed by: NURSE PRACTITIONER

## 2021-02-22 PROCEDURE — 999N001017 HC STATISTIC GLUCOSE BY METER IP

## 2021-02-22 PROCEDURE — 120N000003 HC R&B IMCU UMMC

## 2021-02-22 PROCEDURE — 250N000012 HC RX MED GY IP 250 OP 636 PS 637: Performed by: NURSE PRACTITIONER

## 2021-02-22 PROCEDURE — 99207 PR APP CREDIT; MD BILLING SHARED VISIT: CPT | Performed by: NURSE PRACTITIONER

## 2021-02-22 PROCEDURE — 31600 PLANNED TRACHEOSTOMY: CPT | Mod: GC | Performed by: INTERNAL MEDICINE

## 2021-02-22 PROCEDURE — 999N000157 HC STATISTIC RCP TIME EA 10 MIN

## 2021-02-22 PROCEDURE — 97530 THERAPEUTIC ACTIVITIES: CPT | Mod: GP

## 2021-02-22 PROCEDURE — 250N000013 HC RX MED GY IP 250 OP 250 PS 637: Performed by: INTERNAL MEDICINE

## 2021-02-22 PROCEDURE — 99233 SBSQ HOSP IP/OBS HIGH 50: CPT | Mod: 25 | Performed by: NURSE PRACTITIONER

## 2021-02-22 PROCEDURE — 0B21XFZ CHANGE TRACHEOSTOMY DEVICE IN TRACHEA, EXTERNAL APPROACH: ICD-10-PCS | Performed by: INTERNAL MEDICINE

## 2021-02-22 PROCEDURE — 250N000012 HC RX MED GY IP 250 OP 636 PS 637: Performed by: INTERNAL MEDICINE

## 2021-02-22 PROCEDURE — 250N000009 HC RX 250: Performed by: NURSE PRACTITIONER

## 2021-02-22 PROCEDURE — 83735 ASSAY OF MAGNESIUM: CPT | Performed by: STUDENT IN AN ORGANIZED HEALTH CARE EDUCATION/TRAINING PROGRAM

## 2021-02-22 PROCEDURE — 999N000215 HC STATISTIC HFNC ADULT NON-CPAP

## 2021-02-22 PROCEDURE — 85025 COMPLETE CBC W/AUTO DIFF WBC: CPT | Performed by: STUDENT IN AN ORGANIZED HEALTH CARE EDUCATION/TRAINING PROGRAM

## 2021-02-22 PROCEDURE — 71045 X-RAY EXAM CHEST 1 VIEW: CPT | Mod: 26 | Performed by: RADIOLOGY

## 2021-02-22 PROCEDURE — 999N000043 HC STATISTIC CTO2 CONT OXYGEN TECH TIME EA 90 MIN

## 2021-02-22 PROCEDURE — 80048 BASIC METABOLIC PNL TOTAL CA: CPT | Performed by: STUDENT IN AN ORGANIZED HEALTH CARE EDUCATION/TRAINING PROGRAM

## 2021-02-22 PROCEDURE — 94640 AIRWAY INHALATION TREATMENT: CPT | Mod: 76

## 2021-02-22 PROCEDURE — 36415 COLL VENOUS BLD VENIPUNCTURE: CPT | Performed by: NURSE PRACTITIONER

## 2021-02-22 RX ORDER — ALBUTEROL SULFATE 0.83 MG/ML
2.5 SOLUTION RESPIRATORY (INHALATION)
Status: DISCONTINUED | OUTPATIENT
Start: 2021-02-22 | End: 2021-02-25 | Stop reason: HOSPADM

## 2021-02-22 RX ADMIN — TACROLIMUS 2.5 MG: 5 CAPSULE ORAL at 17:21

## 2021-02-22 RX ADMIN — APIXABAN 2.5 MG: 2.5 TABLET, FILM COATED ORAL at 19:37

## 2021-02-22 RX ADMIN — POSACONAZOLE 300 MG: 40 SUSPENSION ORAL at 13:37

## 2021-02-22 RX ADMIN — INSULIN ASPART 1 UNITS: 100 INJECTION, SOLUTION INTRAVENOUS; SUBCUTANEOUS at 08:34

## 2021-02-22 RX ADMIN — VALGANCICLOVIR HYDROCHLORIDE 450 MG: 50 POWDER, FOR SOLUTION ORAL at 19:36

## 2021-02-22 RX ADMIN — ALBUTEROL SULFATE 2.5 MG: 2.5 SOLUTION RESPIRATORY (INHALATION) at 17:05

## 2021-02-22 RX ADMIN — PREDNISONE 5 MG: 5 TABLET ORAL at 08:32

## 2021-02-22 RX ADMIN — Medication 1 PACKET: at 19:40

## 2021-02-22 RX ADMIN — ALBUTEROL SULFATE 2.5 MG: 2.5 SOLUTION RESPIRATORY (INHALATION) at 08:49

## 2021-02-22 RX ADMIN — METOPROLOL TARTRATE 50 MG: 25 TABLET, FILM COATED ORAL at 19:37

## 2021-02-22 RX ADMIN — ACETYLCYSTEINE 2 ML: 200 SOLUTION ORAL; RESPIRATORY (INHALATION) at 20:49

## 2021-02-22 RX ADMIN — Medication 40 MG: at 08:32

## 2021-02-22 RX ADMIN — Medication 1 PACKET: at 08:33

## 2021-02-22 RX ADMIN — OLANZAPINE 10 MG: 2.5 TABLET ORAL at 19:37

## 2021-02-22 RX ADMIN — BUDESONIDE 0.5 MG: 0.5 INHALANT ORAL at 08:49

## 2021-02-22 RX ADMIN — TACROLIMUS 2 MG: 5 CAPSULE ORAL at 09:19

## 2021-02-22 RX ADMIN — INSULIN ASPART 1 UNITS: 100 INJECTION, SOLUTION INTRAVENOUS; SUBCUTANEOUS at 03:34

## 2021-02-22 RX ADMIN — INSULIN GLARGINE 10 UNITS: 100 INJECTION, SOLUTION SUBCUTANEOUS at 08:34

## 2021-02-22 RX ADMIN — PREDNISONE 2.5 MG: 2.5 TABLET ORAL at 19:37

## 2021-02-22 RX ADMIN — Medication 5 ML: at 08:33

## 2021-02-22 RX ADMIN — INSULIN ASPART 1 UNITS: 100 INJECTION, SOLUTION INTRAVENOUS; SUBCUTANEOUS at 23:30

## 2021-02-22 RX ADMIN — BUDESONIDE 0.5 MG: 0.5 INHALANT ORAL at 20:52

## 2021-02-22 RX ADMIN — ALBUTEROL SULFATE 2.5 MG: 2.5 SOLUTION RESPIRATORY (INHALATION) at 12:07

## 2021-02-22 RX ADMIN — APIXABAN 2.5 MG: 2.5 TABLET, FILM COATED ORAL at 08:32

## 2021-02-22 RX ADMIN — POSACONAZOLE 300 MG: 40 SUSPENSION ORAL at 19:36

## 2021-02-22 RX ADMIN — SULFAMETHOXAZOLE AND TRIMETHOPRIM 80 MG: 200; 40 SUSPENSION ORAL at 08:37

## 2021-02-22 RX ADMIN — POSACONAZOLE 300 MG: 40 SUSPENSION ORAL at 08:33

## 2021-02-22 RX ADMIN — ACETYLCYSTEINE 2 ML: 200 SOLUTION ORAL; RESPIRATORY (INHALATION) at 08:48

## 2021-02-22 RX ADMIN — METOPROLOL TARTRATE 50 MG: 25 TABLET, FILM COATED ORAL at 08:32

## 2021-02-22 RX ADMIN — ALBUTEROL SULFATE 2.5 MG: 2.5 SOLUTION RESPIRATORY (INHALATION) at 20:51

## 2021-02-22 ASSESSMENT — ACTIVITIES OF DAILY LIVING (ADL)
ADLS_ACUITY_SCORE: 16

## 2021-02-22 NOTE — PROGRESS NOTES
Nephrology Consult Daily Note  02/22/2021          Medical Student Note MARIA A Note   Assessment and Recommendation (Student)    Assessment:  Mrs Blue is a 60 yof with ESRD and hx of lung tx admitted with PNA. Had been on 2x/week HD prior to admission, now admitted on 1/24/2021 for management of resp failure.  Nephrology consulted for management of dialysis.        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 Nephrology consutled for dialysis needs.    -Plan for next HD tomorrow, details below.         Melissa Estevez on 2/22/2021 at 11:24 AM   Seen and discussed with Dr Yeung     Recommendations were communicated to primary team via verbal communication.      ADRIÁN Lo CNS  Clinical Nurse Specialist  [phone]          Medical Student Interval History MARIA A Interval History   Medical student: Interval History  Mrs. Blue continues to run HD 3 times per week, ran yesterday and got 2.5L pulled during 3 hour run. Will take today off and plan for a run tomorrow. Working toward rehab or TCU placement hopefully sometime this week.   ESRD-secondary to tacro use, has been on HD close to a year, has AVF with partial graft, difficult access but works well. Was running Monday /Thursday, will likely need to run 3 times/week moving forward.   Volume Status- Edema improving, weight currently just below admission weight, will plan for same amount of UF tomorrow during HD run.   Electrolytes/ pH- K- 4.7 bicarb 27  Ca/phos/pth- Ca 8.8, phos not checked today, last checked 2/15 was 4.5  Anemia- Hgb up to 9.7 today, trending up slowly in the last few  "days. EPO 4k with runs has been added as well.    Review of Systems  Gen: No fevers or chills  CV: No CP at rest  Resp: No SOB at rest  GI: No N/V Interval History :   Mrs Blue transferred out of ICU, ran HD yesterday, plan to run on TTS schedule and anticipate she will go to ARU soon as she has been stable off the vent.  No changes in HD plan, will order run for tomorrow.       Assessment & Recommendations:   ESRD-Runs at [place] through [facility].  Has atypical HD schedule of Monday and Thursday, has AVF with partial graft which has been challenging to access per RN's.  Needed CRRT 2/5-2/8 due to hemodynamic instability but otherwise has run iHD.  Working on getting to stable 3x/week schedule, may be progressing to trach for resp failure.                   -Holding on dialysis today, planning for run tomorrow.                 -Has L arm AVF/graft, somewhat challenging access.                -Removed temp HD line on 2/9.        Volume status-Has mild abdominal edema but improving, trying for 2-3L of UF with runs.       Electrolytes/pH-K 4.7, bicarb 27.      Ca/phos/pth-Ca 8.8, Mg and Phos mildly up last check.      Anemia-Hgb 9.7 after 1u PRBC's this am.  Checked iron stores and sats 36% on 2/3, starting EPO 4k with runs.       Nutrition-Nutren TF.      Review of Systems:   Gen: No fevers or chills  CV: No CP at rest  Resp: No SOB at rest  GI: No N/V        Physical Exam (Student)  \"Vitals were reviewed\"  /67 (BP Location: Right arm)   Pulse 91   Temp 98.5  F (36.9  C) (Oral)   Resp 18   Ht 1.6 m (5' 2.99\")   Wt 54.8 kg (120 lb 13 oz)   LMP 06/07/2014 (Exact Date)   SpO2 96%   BMI 21.41 kg/m      Intake/Output Summary (Last 24 hours) at 2/22/2021 1124  Last data filed at 2/22/2021 0900  Gross per 24 hour   Intake 1550 ml   Output 2500 ml   Net -950 ml      GENERAL APPEARANCE:  trached.   EYES: no scleral icterus  Endo: no moon facies  Pulmonary: equal expansion.    CV: regular " "rhythm, normal rate  GI: soft, non distended  MS: no evidence of inflammation in joints, no muscle tenderness  :  no rivas, not making urine  SKIN: warm, dry, +1 edema.    NEURO: No focal deficits.      Physical Exam (Physician)  Vitals were reviewed  /67 (BP Location: Right arm)   Pulse 91   Temp 98.5  F (36.9  C) (Oral)   Resp 18   Ht 1.6 m (5' 2.99\")   Wt 54.8 kg (120 lb 13 oz)   LMP 06/07/2014 (Exact Date)   SpO2 96%   BMI 21.41 kg/m       Intake/Output Summary (Last 24 hours) at 2/22/2021 1124  Last data filed at 2/22/2021 0900  Gross per 24 hour   Intake 1550 ml   Output 2500 ml   Net -950 ml        GENERAL APPEARANCE: Vent via trached.   EYES: no scleral icterus  Endo: no moon facies  Pulmonary: Intubated, proned, equal expansion.    CV: regular rhythm, normal rate - Edema present  GI: soft, non distended  MS: no evidence of inflammation in joints, no muscle tenderness  :  Rivas present  SKIN: warm, dry, +1 edema.    NEURO: No focal deficits.      Labs:   The following labs were reviewed:   CMP  Recent Labs   Lab 02/22/21 0527 02/20/21 0503 02/19/21 0458 02/18/21  0430   * 132* 134 134   POTASSIUM 4.7 4.2 4.0 3.6   CHLORIDE 99 98 100 100   CO2 27 28 29 27   ANIONGAP 5 6 5 8   * 154* 102* 158*   BUN 38* 32* 46* 29   CR 2.53* 2.37* 3.14* 2.19*   GFRESTIMATED 20* 22* 16* 24*   GFRESTBLACK 23* 25* 18* 28*   CHELSEY 8.8 8.6 8.8 8.4*   MAG 1.6  --   --   --    PROTTOTAL  --  6.4*  --   --    ALBUMIN  --  2.4*  --   --    BILITOTAL  --  0.4  --   --    ALKPHOS  --  244*  --   --    AST  --  15  --   --    ALT  --  34  --   --      CBC  Recent Labs   Lab 02/22/21 0527 02/20/21  0503 02/19/21  0458 02/18/21  0430   HGB 9.7* 10.0* 9.0* 8.8*   WBC 3.0* 2.5* 3.0* 3.2*   RBC 3.14* 3.13* 2.96* 2.87*   HCT 31.1* 31.5* 29.3* 28.3*   MCV 99 101* 99 99   MCH 30.9 31.9 30.4 30.7   MCHC 31.2* 31.7 30.7* 31.1*   RDW 21.2* 21.5* 21.4* 21.3*    217 207 209     INR  Recent Labs   Lab " 02/19/21  0458   INR 0.99     ABGNo lab results found in last 7 days.   URINE STUDIES  Recent Labs   Lab Test 01/24/21  1729 10/21/19  2240 09/12/19  0125 08/07/19  1512   COLOR Yellow Yellow Light Yellow Yellow   APPEARANCE Slightly Cloudy Cloudy Clear Clear   URINEGLC Negative Negative Negative Negative   URINEBILI Negative Negative Negative Negative   URINEKETONE 5* Negative 10* Negative   SG 1.023 1.018 1.008 1.016   UBLD Small* Trace* Negative Negative   URINEPH 5.0 5.0 7.5* 7.0   PROTEIN 30* 30* 30* 100*   NITRITE Negative Negative Negative Negative   LEUKEST Moderate* Large* Negative Trace*   RBCU 26* 25* <1 10*   WBCU 34* 115* 3 19*     Recent Labs   Lab Test 08/07/19  1512   UTPG 2.47*     PTH  No lab results found.  IRON STUDIES  Recent Labs   Lab Test 02/03/21  0415 12/13/18  1033 08/01/18  0921 05/08/18  0709   IRON 51 16* 93 48   * 221* 248 275   IRONSAT 36 7* 37 18   YOLA  --  302* 571*  --      Imaging:      Current Medications:    acetylcysteine  2 mL Nebulization BID     albuterol  2.5 mg Nebulization 4x daily     apixaban ANTICOAGULANT  2.5 mg Oral BID     B and C vitamin Complex with folic acid  5 mL Oral or Feeding Tube Daily     budesonide  0.5 mg Nebulization BID     fiber modular (NUTRISOURCE FIBER)  1 packet Per Feeding Tube BID     insulin aspart  1-12 Units Subcutaneous Q4H     insulin glargine  10 Units Subcutaneous QAM AC     metoprolol tartrate  50 mg Oral or Feeding Tube BID     OLANZapine  10 mg Oral or Feeding Tube Q24H     pantoprazole  40 mg Oral QAM AC     posaconazole  300 mg Per G Tube TID     predniSONE  2.5 mg Oral or Feeding Tube QPM     predniSONE  5 mg Oral or Feeding Tube QAM     protein modular  1 packet Per Feeding Tube BID     sulfamethoxazole-trimethoprim  10 mL Oral or Feeding Tube Once per day on Mon Wed Fri     tacrolimus  2 mg Per G Tube Q24H     tacrolimus  2.5 mg Per G Tube Q24H     valGANciclovir  450 mg Oral Once per day on Mon Thu       dextrose Stopped  (02/16/21 6789)     sodium chloride 10 mL/hr at 02/08/21 0400     Melissa Estevez I, Chey Yeung, saw and evaluated this patient as part of a shared visit.  I have reviewed and discussed with the advanced practice provider their history, physical and plan.    I personally reviewed the vital signs, medications, labs and imaging.    My key history or physical exam findings:  ESRD, infection/ pneumonia, lung transplant  Key management decisions made by me:  Dialysis tomorrow for volume and solute control    Chey Yeung  Date of Service (when I saw the patient): 2/22

## 2021-02-22 NOTE — PROCEDURES
INTERVENTIONAL PULMONOLOGY       Procedure(s):    Percutaneous dilational tracheostomy revision     Indication:  Difficulty with speaking valve with #8 Shiley in place    Attending of Record:  Preet Patel MD    Interventional Pulmonary Fellow   Juana Baugh MD     Trainees Present:   None     Medications:    None    Sedation Time:   None    The patient's medical record has been reviewed.  The indication for the procedure was reviewed.  The necessary history and physical examination was performed and reviewed.  The risks, benefits and alternatives of the procedure were discussed with the the patient in detail and she had the opportunity to ask questions. Verbal consent was obtained for the procedure.     After clinical evaluation and reviewing the indication, risks, alternatives and benefits of the procedure the patient was deemed to be in satisfactory condition to undergo the procedure.      A Tuberculosis risk assessment was performed:  The patient has no known RISK of Tuberculosis    The procedure was performed in a negative airflow room: The patient could not be moved to a negative airflow room because of no risk of TB    Maneuvers / Procedure:      Percutaneous tracheostomy revision. Patient examined at bedside and deemed suitable for trach downsizing. Has been tolerating trach dome with cuff down for >24 hours. Patient was placed in supine position and trach ties were removed. Pre-existing trach was removed with firm pressure and new #6 cuffless Shiley was placed. Trach ties were reattached. Small amount of blood tinged secretions suctioned afterwards.     Any disposable equipment was visually inspected and deemed to be intact immediately post procedure.      Relevant Pictures: None    Recommendations:     --Successful downsizing of trach, now was #6 cuffless Shiley in place, extra trach at bedside.  --If continues to have bloody secretions later this afternoon, consider holding nighttime dose of  apixaban    Juana Baugh  Interventional Pulmonary Fellow  [phone]

## 2021-02-22 NOTE — CONSULTS
Electrophysiology Consultation Note   EP Attending: .   Reason for consultation: AF.   Provider requesting consultation: , [facility] Medicine [facility].  Date of Service: 2/22/2021      HPI:   Ms. Blue is a 60 year old female who has a past medical history significant for ILD antisynthetase syndrome s/p BSLT 2018 c/b right mainstem bronchial stenosis s/p several dilations, left-sided aspergillus empyema, s/p amphotericin beads, EBV viremia, CMV viremia, ESRD on iHD M/Th, Raynaud's disease, and RA. She was admitted to OSH on 1/22/21 for acute hypoxemic respiratory failure and new lung opacities. She required intubation on 1/24/21 and was transferred to [facility] for ongoing management.  She was extubated 1/26/21 but developed progressive hypoxemia and required reintubation from 1/27/21-1/29/21.  She then had a rapid decompensation 2/3/21 with ARDS presentation requiring reintubation, prone positioning, and inhaled epoprostenol, suspected d/t worsening PJP. Now S/p Trach 2/12/21, and PEG-J 2/16/21.  She also had another dilation of right main by IP on 2/19/21.  She has remained stable on TD exclusively since bronch. She has been noted to have episodes of AT/AFL. She had an episode on 1/30/21 and then has had runs all self limiting since, but has mostly been in sinus. There is some concerns with hypotension during iHD runs. She is on Eliquis dosed 2.5 mg BID and Metoprolol for rate control. She denies any awareness of arrhythmia. She denies any prior cardiac history, including no history of arrhythmias. Recent echo from 1/25/21 showed LVEF 60-65%, normal RV size/function and high RA pressure. Current cardiac medications: Eliquis and Metoprolol.      Past Medical History:   Past Medical History:   Diagnosis Date     Abdominal pain 10/6/2019     Acute on chronic respiratory failure with hypoxia (H) 2/21/2018     Antisynthetase syndrome (H) 2014     Chronic cough      Chronic infection     recent C diff   8/18     Dehydration 8/1/2018     Dehydration 8/1/2018     Dermatomyositis (H)      Dysplasia of cervix, low grade (ESTRADA 1)      ILD (interstitial lung disease) (H)      Nausea and vomiting 12/4/2018     Osteopenia      PONV (postoperative nausea and vomiting)      Pulmonary hypertension (H)      Raynaud's disease      Seronegative rheumatoid arthritis (H)      Past Surgical History:   Past Surgical History:   Procedure Laterality Date     BRONCHOSCOPY (RIGID OR FLEXIBLE), DIAGNOSTIC N/A 4/10/2018    Procedure: COMBINED BRONCHOSCOPY (RIGID OR FLEXIBLE), LAVAGE;;  Surgeon: Mariposa Donohue MD;  [facility]     BRONCHOSCOPY (RIGID OR FLEXIBLE), DIAGNOSTIC N/A 12/23/2020    Procedure: BRONCHOSCOPY, WITH BRONCHOALVEOLAR LAVAGE;  Surgeon: Uri Bass MD;  [facility]     BRONCHOSCOPY (RIGID OR FLEXIBLE), DILATE BRONCHUS / TRACHEA N/A 10/11/2018    Procedure: BRONCHOSCOPY (RIGID OR FLEXIBLE), DILATE BRONCHUS / TRACHEA;  Flexible And Rigid Bronchoscopy and Dilation;  Surgeon: Wilber Lin MD;  [facility]     BRONCHOSCOPY FLEXIBLE N/A 3/13/2018    Procedure: BRONCHOSCOPY FLEXIBLE;  Flexible Bronchoscopy ;  Surgeon: Gissell Sanchez MD;  [facility]     BRONCHOSCOPY FLEXIBLE N/A 5/9/2018    Procedure: BRONCHOSCOPY FLEXIBLE;;  Surgeon: Wilber Lin MD;  [facility]     BRONCHOSCOPY FLEXIBLE AND RIGID N/A 9/10/2018    Procedure: BRONCHOSCOPY FLEXIBLE AND RIGID;  Flexible and Rigid Bronchoscopy with Balloon Dilation, tissue debulking with cryo, and Right mainstem bronchus stent placement;  Surgeon: Wilber Lin MD;  [facility]     BRONCHOSCOPY RIGID N/A 6/6/2018    Procedure: BRONCHOSCOPY RIGID;;  Surgeon: Lopez Macias MD;  [facility]     BRONCHOSCOPY, DILATE BRONCHUS, STENT BRONCHUS, COMBINED N/A 6/11/2018    Procedure: COMBINED BRONCHOSCOPY, DILATE BRONCHUS, STENT BRONCHUS;  Flexible Bronchoscopy, Balloon Dilation, Bronchial Washings;   Surgeon: Wilber Lin MD;  [facility]     BRONCHOSCOPY, DILATE BRONCHUS, STENT BRONCHUS, COMBINED Right 7/10/2018    Procedure: COMBINED BRONCHOSCOPY, DILATE BRONCHUS, STENT BRONCHUS;  Flexible Bronchoscopy, Balloon Dilation, Bronchial Washings  ;  Surgeon: Wilber Lin MD;  [facility]     BRONCHOSCOPY, DILATE BRONCHUS, STENT BRONCHUS, COMBINED N/A 8/2/2018    Procedure: COMBINED BRONCHOSCOPY, DILATE BRONCHUS, STENT BRONCHUS;  Flexible Bronchoscopy, Bronchial Washings, Balloon Dilation;  Surgeon: Wilber Lin MD;  [facility]     BRONCHOSCOPY, DILATE BRONCHUS, STENT BRONCHUS, COMBINED N/A 8/20/2018    Procedure: COMBINED BRONCHOSCOPY, DILATE BRONCHUS, STENT BRONCHUS;  Flexible Bronchoscopy, Balloon Dilation;  Surgeon: Wilber Lin MD;  [facility]     BRONCHOSCOPY, DILATE BRONCHUS, STENT BRONCHUS, COMBINED N/A 10/29/2018    Procedure: Flexible Bronchoscopy, Balloon Dilation, Stent Revision, Airway Examination And Therapeutic Suctioning, Cyro Tumor Debulking;  Surgeon: Wilber Lin MD;  [facility]     BRONCHOSCOPY, DILATE BRONCHUS, STENT BRONCHUS, COMBINED N/A 11/20/2018    Procedure: Rigid Bronchoscopy, Stent Removal and dilitation;  Surgeon: Wilber Lin MD;  [facility]     BRONCHOSCOPY, DILATE BRONCHUS, STENT BRONCHUS, COMBINED N/A 12/14/2018    Procedure: Flexible And Rigid Bronchoscopy, Balloon Dilation, Bronchial Washing;  Surgeon: Wilber Lin MD;  [facility]     BRONCHOSCOPY, DILATE BRONCHUS, STENT BRONCHUS, COMBINED N/A 1/17/2019    Procedure: Flexible And Rigid Bronchoscopy, Balloon Dilation.;  Surgeon: Wilber Lin MD;  [facility]     BRONCHOSCOPY, DILATE BRONCHUS, STENT BRONCHUS, COMBINED N/A 3/7/2019    Procedure: Flexible and Rigid Bronchoscopy, Bronchial Washing, Balloon Dilation;  Surgeon: Wilber Lin MD;  [facility]     BRONCHOSCOPY, DILATE BRONCHUS, STENT BRONCHUS,  COMBINED N/A 6/6/2019    Procedure: Rigid and Flexible Bronchoscopy, Balloon Dilation;  Surgeon: Wilber Lin MD;  [facility]     BRONCHOSCOPY, DILATE BRONCHUS, STENT BRONCHUS, COMBINED N/A 10/11/2019    Procedure: Flexible and Rigid Bronchoscopy, Balloon Dilation, Bronchoalveolar Lagave;  Surgeon: Wilber Lin MD;  [facility]     BRONCHOSCOPY, DILATE BRONCHUS, STENT BRONCHUS, COMBINED N/A 2/19/2021    Procedure: BRONCHOSCOPY, flexible, airway dilation, and bronchial wash;  Surgeon: Wilber Lin MD;  [facility]     CV RIGHT HEART CATH MEASUREMENTS RECORDED N/A 3/10/2020    Procedure: CV RIGHT HEART CATH;  Surgeon: Wai Garcia MD;  [facility]     ENT SURGERY      tonsillectomy as a child     ESOPHAGOSCOPY, GASTROSCOPY, DUODENOSCOPY (EGD), COMBINED N/A 10/29/2018    Procedure: COMBINED ESOPHAGOSCOPY, GASTROSCOPY, DUODENOSCOPY (EGD) with biopsies and polypectomy;  Surgeon: Chente Bloom MD;  [facility]     INSERT EXTRACORPORAL MEMBRANE OXYGENATOR ADULT (OUTSIDE OR) N/A 2/27/2018    Procedure: INSERT EXTRACORPORAL MEMBRANE OXYGENATOR ADULT (OUTSIDE OR);  INSERT EXTRACORPORAL MEMBRANE OXYGENATOR ADULT (OUTSIDE OR) ;  Surgeon: Toby Hernandez MD;  [facility]     IR CVC TUNNEL PLACEMENT > 5 YRS OF AGE  10/25/2019     IR GASTRO JEJUNOSTOMY TUBE PLACEMENT  2/16/2021     IR PARACENTESIS  1/8/2020     IR THORACENTESIS  9/13/2019     IR TRANSCATHETER BIOPSY  1/8/2020     no prior surgery       REMOVE EXTRACORPORAL MEMBRANE OXYGENATOR ADULT N/A 3/3/2018    Procedure: REMOVE EXTRACORPORAL MEMBRANE OXYGENATOR ADULT;  Removal of Right Femoral Venous and Right Axillary Arterial Extracorporeal Membrane Oxygenator;  Surgeon: Toby Hernandez MD;  [facility]     TRANSPLANT LUNG RECIPIENT SINGLE X2 Bilateral 3/1/2018    Procedure: TRANSPLANT LUNG RECIPIENT SINGLE X2;  Median Sternotomy, Extracorporeal Membrane  Oxygenator, bilateral sequential lung transplant;  Surgeon: Toby Hernandez MD;  [facility]     Allergies: Per MAR   No Known Allergies  Medications:   Per MAR current outpatient cardiovascular medications include:   Medications Prior to Admission   Medication Sig Dispense Refill Last Dose     albuterol (PROAIR HFA/PROVENTIL HFA/VENTOLIN HFA) 108 (90 Base) MCG/ACT inhaler Inhale 2 puffs into the lungs every 4 hours as needed for shortness of breath / dyspnea or wheezing 1 Inhaler 1      amLODIPine (NORVASC) 5 MG tablet Take 2 tablets (10 mg) by mouth daily 60 tablet 11      B Complex-C-Folic Acid (DIALYVITE) TABS Take 1 tablet by mouth daily        calcium-vitamin D (CALTRATE) 600-400 MG-UNIT per tablet Take 1 tablet by mouth daily        carvedilol (COREG) 25 MG tablet Take 1 tablet (25 mg) by mouth 2 times daily (with meals) 60 tablet 11      cefTAZidime (FORTAZ) 1 GM vial Inject 1 g into the vein every 24 hours On Dialysis days Monday and Thursday give Antibiotic AFTER dialysis 70 mL 0      ferrous sulfate (FEROSUL) 325 (65 Fe) MG tablet Take 1 tablet (325 mg) by mouth daily (with breakfast) 60 tablet 2      fluticasone (FLONASE) 50 MCG/ACT nasal spray Spray 1 spray into both nostrils daily 15.8 mL 0      furosemide (LASIX) 40 MG tablet Take 40 mg by mouth daily        lidocaine-prilocaine (EMLA) 2.5-2.5 % external cream Apply 2.5 g topically as needed        magnesium oxide (MAG-OX) 400 MG tablet Take 400 mg by mouth daily         multivitamin, therapeutic with minerals (THERA-VIT-M) TABS tablet Take 1 tablet by mouth daily 30 each 11      pantoprazole (PROTONIX) 40 MG EC tablet Take 1 tablet (40 mg) by mouth daily 30 tablet 11      posaconazole (NOXAFIL) 100 MG EC tablet TAKE THREE TABLETS BY MOUTH TWICE ON THE FIRST DAY OF TREATMENT, THEN TAKE THREE TABLETS DAILY THEREAFTER 90 tablet 2      predniSONE 2.5 MG PO tablet Take two tablets (5mg) every AM and one tablet (2.5mg) every evening 90  tablet 11      SLOW-MAG 71.5-119 MG TBEC Take 3 tablets by mouth daily        tacrolimus (GENERIC EQUIVALENT) 0.5 MG capsule Take 1 capsule (0.5 mg) by mouth every evening Total dose: 1 mg in the AM and 0.5 mg in the PM 30 capsule 11      tacrolimus (GENERIC EQUIVALENT) 1 MG capsule Take 1 capsule (1 mg) by mouth every morning Total dose: 1 mg in the AM and 0.5 mg in the PM 30 capsule 11      No current outpatient medications on file.     Current Facility-Administered Medications   Medication Dose Route Frequency     acetylcysteine  2 mL Nebulization BID     albuterol  2.5 mg Nebulization 4x daily     apixaban ANTICOAGULANT  2.5 mg Oral BID     B and C vitamin Complex with folic acid  5 mL Oral or Feeding Tube Daily     budesonide  0.5 mg Nebulization BID     fiber modular (NUTRISOURCE FIBER)  1 packet Per Feeding Tube BID     insulin aspart  1-12 Units Subcutaneous Q4H     insulin glargine  10 Units Subcutaneous QAM AC     metoprolol tartrate  50 mg Oral or Feeding Tube BID     OLANZapine  10 mg Oral or Feeding Tube Q24H     pantoprazole  40 mg Oral QAM AC     posaconazole  300 mg Per G Tube TID     predniSONE  2.5 mg Oral or Feeding Tube QPM     predniSONE  5 mg Oral or Feeding Tube QAM     protein modular  1 packet Per Feeding Tube BID     sulfamethoxazole-trimethoprim  10 mL Oral or Feeding Tube Once per day on Mon Wed Fri     tacrolimus  2 mg Per G Tube Q24H     tacrolimus  2.5 mg Per G Tube Q24H     valGANciclovir  450 mg Oral Once per day on Mon Thu     Family History:   Family History   Problem Relation Age of Onset     Hypertension Mother      Arthritis Mother      Pancreatic Cancer Father      Diabetes Father      Social History:   Social History     Tobacco Use     Smoking status: Never Smoker     Smokeless tobacco: Never Used   Substance Use Topics     Alcohol use: No     Alcohol/week: 1.0 standard drinks     Types: 1 Glasses of wine per week       ROS:   A comprehensive 10 point ROS was negative other  "than as mentioned in HPI.    Physical Examination:   VITALS: /67 (BP Location: Right arm)   Pulse 91   Temp 98.5  F (36.9  C) (Oral)   Resp 18   Ht 1.6 m (5' 2.99\")   Wt 54.8 kg (120 lb 13 oz)   LMP 2014 (Exact Date)   SpO2 99%   BMI 21.41 kg/m    GENERAL APPEARANCE: AxO, NAD   HEENT: MMM. PERRLA.   NECK: Trach present.    CHEST: coarse lung sounds.   CARDIOVASCULAR: S1S2, Reg.   ABDOMEN: BS+, soft, NT, ND.   EXTREMITIES: No pedal edema. Distal pulses intact.   NEURO: Grossly nonfocal.   PSYCH: Normal affect.  SKIN: Warm and dry.   Data:   Labs:  BMP  Recent Labs   Lab 21  0503 02/19/21  0458 02/18/21  043   * 132* 134 134   POTASSIUM 4.7 4.2 4.0 3.6   CHLORIDE 99 98 100 100   CHELSEY 8.8 8.6 8.8 8.4*   CO2 27 28 29 27   BUN 38* 32* 46* 29   CR 2.53* 2.37* 3.14* 2.19*   * 154* 102* 158*     CBC  Recent Labs   Lab 21  0503 02/19/21  0458 02/18/21  0430   WBC 3.0* 2.5* 3.0* 3.2*   RBC 3.14* 3.13* 2.96* 2.87*   HGB 9.7* 10.0* 9.0* 8.8*   HCT 31.1* 31.5* 29.3* 28.3*   MCV 99 101* 99 99   MCH 30.9 31.9 30.4 30.7   MCHC 31.2* 31.7 30.7* 31.1*   RDW 21.2* 21.5* 21.4* 21.3*    217 207 209     INR  Recent Labs   Lab 21   INR 0.99     No results found for: CKTOTAL, CKMB, TROPN  Cholesterol (mg/dL)   Date Value   2020 139   2018 168     HDL Cholesterol (mg/dL)   Date Value   2020 69   2018 48 (L)     LDL Cholesterol Calculated (mg/dL)   Date Value   2020 55   2018 97     EK/25/21 ECHO:   Interpretation Summary  Global and regional left ventricular function is normal with an EF of 60-65%.  Right ventricular function, chamber size, wall motion, and thickness are  normal.  IVC diameter >2.1 cm collapsing <50% with sniff suggests a high RA pressure  estimated at 15 mmHg or greater.  Assessment:   Ms. Blue is a 60 year old female who has a past medical history significant for ILD " antisynthetase syndrome s/p BSLT 2018 c/b right mainstem bronchial stenosis s/p several dilations, left-sided aspergillus empyema, s/p amphotericin beads, EBV viremia, CMV viremia, ESRD on iHD M/Th, Raynaud's disease, and RA. She was admitted to OSH on 1/22/21 for acute hypoxemic respiratory failure and new lung opacities. She required intubation on 1/24/21 and was transferred to [facility] for ongoing management.  She was extubated 1/26/21 but developed progressive hypoxemia and required reintubation from 1/27/21-1/29/21.  She then had a rapid decompensation 2/3/21 with ARDS presentation requiring reintubation, prone positioning, and inhaled epoprostenol, suspected d/t worsening PJP. Now S/p Trach 2/12/21, and PEG-J 2/16/21.  She also had another dilation of right main by IP on 2/19/21.  She has remained stable on TD exclusively since bronch. She has been noted to have episodes of AT/AFL. She had an episode on 1/30/21 and then has had runs all self limiting since, but has mostly been in sinus. There is some concerns with hypotension during iHD runs. She is on Eliquis dosed 2.5 mg BID and Metoprolol for rate control. She denies any awareness of arrhythmia. She denies any prior cardiac history, including no history of arrhythmias. Recent echo from 1/25/21 showed LVEF 60-65%, normal RV size/function and high RA pressure. Current cardiac medications: Eliquis and Metoprolol.   EP Recommendations:  Atrial Tachycardia/Flutter:  1. Stroke Prophylaxis:  CHADSVASC=+gender  1, corresponding to a 1.3% annual stroke / systemic emolism event rate. indicating no need for long term oral anticoagulation.  She is on Eliquis for line related DVT, but does not require for AF.   2. Rate Control: Continue Metoprolol.   3. Rhythm Control: Cardioversion, Antiarrhythmics and/or ablation are options for rhythm control. She is having short self limiting runs that should not be hemodynamically significant. Cardioversion would not be fruitful as she  is already going in and out. Limit AAT options given ESRD. Would avoid addition for now as these arrhythmias are all likely in setting of acute illness. Would do zio patch prior to outpatient follow up to monitor for any subclinical arrhyhmias.   4. Risk Factor Management:  BP control, and DC evaluation if indicated.      The patient states understanding and is agreeable with plan.   Thank you for allowing us to participate in the care of this patient.     The patient was discussed w/ Dr. Mcmahan.  The above note reflects our joint plan.    ADRIÁN Gomez CNP  Electrophysiology Consult Service  Pager: [phone]    EP STAFF NOTE  I have seen and examined the patient as part of a shared visit with FAY Gomez NP.  I agree with the note above. I reviewed today's vital signs and medications. I have reviewed and discussed with the advanced practice provider their physical examination, assessment, and plan   Briefly, OLT, intermittent flutter in the context of multiple illnesses and infections  My key history/exam findings are: RRR.   The key management decisions made by me: monitor and f/u as outpatient..    Jermaine Mcmahan MD [facility]  Cardiology - Electrophysiology

## 2021-02-22 NOTE — PLAN OF CARE
OT 6B: cancel. Pt with trach exchange this afternoon and not feeling well afterwards. SLP planning to work with pt. Will reschedule for tomorrow.

## 2021-02-22 NOTE — PLAN OF CARE
Neuro: A&Ox4.   Cardiac: SR, a-flutter at times. HR 90s. VSS.   Respiratory: Sating >92% on 30% trach dome. Suctioning q2-4h.   GI/: Oliguric. BM x1 this shift.   Diet/appetite: NPO. GJ, g clamped, j w/ tube feeds @ 50mL/hr.   Activity:  Assist of 1, up in room. Independently repositioning.   Pain: Denies.  Skin: 2 RN skin assessment completed by writer and Shayla DEL ROSARIO. Blanchable redness noted to coccyx and R side.   LDA's: Shiley 8. PIV x2. GJ.     Plan: Continue with POC. Notify primary team with changes.

## 2021-02-22 NOTE — PROGRESS NOTES
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  Postoperative course was complicated by right mainstem bronchial stenosis requiring several dilations, left-sided aspergillus empyema in 2019, EBV viremia, CMV viremia, and ESRD on HD Monday/Thursday.  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  Respiratory status worsened, and she was intubated and transferred to [facility] on 1/24.  She had decompensation to ARDS on 2/3.  Now s/p tracheostomy on 2/12 and PEG-J on 2/16.  Also s/p dilation of right main bronchus on 2/19.  Stable on trach dome after bronchoscopy, transferred to medicine floor 2/21.     Acute hypoxemic respiratory failure  Sepsis in setting of PJP pneumonia, possible secondary pneumonia  S/p tracheostomy placement (2/12/21)  Presented to outside hospital as mentioned above with acute hypoxemic respiratory failure secondary to PJP pneumonia.  She failed extubation trials x2, developed ARDS.  Required proning, inhaled epoprostenol, pressors.  Then required tracheostomy, which was placed on 2/12.  Also completed a course of steroids with a taper through 2/17 and course of Bactrim X 21 days.  Bronchoscopy on 2/19 and requiring only trach dome since that time.  2/22 #8 Shiley trach exchanged for #6 cuffless shiley per IP     -Cultures from bronchoscopy on 2/19 growing staph epi.  Continuing to follow per pulmonary.    -Continuing chest physiotherapy and nebs: Albuterol, Mucomyst, Pulmicort    -Pulmonary recommends repeat bronchoscopy in 6 weeks (~4/2)    -Work with speech therapy, passy shakir valve trials  -Patient to learn how to  self suction herself, only superficial secretions per pulmonary. In preparation for discharge to ARU.      S/p bilateral lung transplant  ILD with antisynthetase sydrome s/p BSLT 03/2018 (CMV D+/R+, EBV D+/R+) postoperatively complicated by right mainstem bronchial stenosis s/p several dilations (with 80% narrowing at anastamosis on bronch on 2/11), 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    -Continue Tacro 2 mg every morning and 2.5 mg every evening.  Tacro level ordered for 2/22. Appreciate pulmonary assistance in dosing.     -Prednisone 5 mg every morning and 2.5 mg every evening    -Continues on Bactrim every Monday, Wednesday, Friday for PJP ppx    -Continue VGCV for CMV ppx through 2/24 (due to previous high dose steroids)     L Aspergillus Empyema   -Continue pozaconazole, plan for indefinite course      ESRD  Prior to hospitalization, HD biweekly. Required CRRT 2/4 to 2/8.  Then transition to intermittent HD.  Access is AVF with partial graft, challenging to access.     -Orders per nephrology team.      -I/O    -Daily weights    -Avoid nephrotoxic medications    -Receives Epogen with HD      Atrial fibrillation with RVR   Reported to be limiting dialysis runs.   -EP consult today for consideration of possible A. Fib ablation.   -continue metoprolol, apixaban.      Additional medical concerns:    hx anxiety: bedtime and as needed olanzapine, hydroxyzine prn  hx anisocoria: no deficits on exam, negative CTH. Monitor.  hx HTN: holding pta meds  seronegative RA with Raynaud's: stable. Monitor.          Diet: Adult Formula Drip Feeding: Continuous Nutren 1.5; Jejunostomy; Goal Rate: 50; mL/hr; Medication - Feeding Tube Flush Frequency: At least 15-30 mL water before and after medication administration and with tube clogging; Amount to Send (Nutrition u...  NPO for Medical/Clinical Reasons Except for: Meds    DVT Prophylaxis: apixaban  Basilio Catheter: not present  Code Status:  Full Code           Disposition Plan   Expected discharge: 2 - 3 days, recommended to transitional care unit once a fib evaluation.  Entered: Cristin Patino DO 02/22/2021, 4:58 PM       The patient's care was discussed with the Bedside Nurse, Patient and Patient's Family.    Cristin Patino DO  Hospitalist Service, [address]  Contact information available via [facility] Paging/Directory  Please see sign in/sign out for up to date coverage information  ______________________________________________________________________    Interval History   No overnight events.  Requiring frequent suctioning, but superficial suctioning per pulmonary team.  She feels well, denies dyspnea, chest pain, abdominal pain, nausea, diarrhea.   Trach exchanged today.     Data reviewed today: I reviewed all medications, new labs and imaging results over the last 24 hours. I personally reviewed no images or EKG's today.    Physical Exam   Vital Signs: Temp: 98.4  F (36.9  C) Temp src: Oral BP: 125/75 Pulse: 96   Resp: 16 SpO2: 99 % O2 Device: Trach dome Oxygen Delivery: 20 LPM  Weight: 120 lbs 12.99 oz  Constitutional: no apparent distress, pleasant and cooperative   Cardiovascular: regular rate and rhythm, normal S1 and S2, no murmurs, rubs, or gallops noted, no bilateral lower extremity edema   Respiratory: coarse breath sounds and diminished throughout, no wheezing, rales, or rhonchi, normal work of breathing   GI: abdomen soft, non tender, non distended, bowel sounds present, PEG-J tube in place without erythema or drainage at site   Neuro: alert and oriented, no gross focal deficits noted     Data   Recent Labs   Lab 02/22/21  0527 02/20/21  0503 02/19/21  0458   WBC 3.0* 2.5* 3.0*   HGB 9.7* 10.0* 9.0*   MCV 99 101* 99    217 207   INR  --   --  0.99   * 132* 134   POTASSIUM 4.7 4.2 4.0   CHLORIDE 99 98 100   CO2 27 28 29   BUN 38* 32* 46*   CR 2.53* 2.37* 3.14*   ANIONGAP 5 6  5   CHELSEY 8.8 8.6 8.8   * 154* 102*   ALBUMIN  --  2.4*  --    PROTTOTAL  --  6.4*  --    BILITOTAL  --  0.4  --    ALKPHOS  --  244*  --    ALT  --  34  --    AST  --  15  --      Recent Results (from the past 24 hour(s))   XR Chest Port 1 View    Narrative    EXAM: XR CHEST PORT 1 VW  2/22/2021 11:53 AM     HISTORY:  Interval f/u       COMPARISON:  Chest x-ray 2/18/2021    FINDINGS:   Semiupright portable AP view of the chest. Postoperative changes of  bilateral lung transplant with median sternotomy wires intact.  Tracheostomy tube with tip projecting in the mid thoracic trachea. Low  inspiratory volumes. Cardiac silhouette is largely obscured.    No significant change in diffuse mixed interstitial and airspace  opacities. Unchanged right basilar opacity.      Impression    IMPRESSION:   No significant change to diffuse mixed interstitial and airspace  opacities or right basilar atelectasis with probable effusion.    I have personally reviewed the examination and initial interpretation  and I agree with the findings.    ADAM GONZALEZ MD     Medications     dextrose Stopped (02/16/21 1644)     sodium chloride 10 mL/hr at 02/08/21 0400       acetylcysteine  2 mL Nebulization BID     albuterol  2.5 mg Nebulization 4x daily     apixaban ANTICOAGULANT  2.5 mg Oral BID     B and C vitamin Complex with folic acid  5 mL Oral or Feeding Tube Daily     budesonide  0.5 mg Nebulization BID     fiber modular (NUTRISOURCE FIBER)  1 packet Per Feeding Tube BID     insulin aspart  1-12 Units Subcutaneous Q4H     insulin glargine  10 Units Subcutaneous QAM AC     metoprolol tartrate  50 mg Oral or Feeding Tube BID     OLANZapine  10 mg Oral or Feeding Tube Q24H     pantoprazole  40 mg Oral QAM AC     posaconazole  300 mg Per G Tube TID     predniSONE  2.5 mg Oral or Feeding Tube QPM     predniSONE  5 mg Oral or Feeding Tube QAM     protein modular  1 packet Per Feeding Tube BID     sulfamethoxazole-trimethoprim  10 mL Oral  or Feeding Tube Once per day on Mon Wed Fri     tacrolimus  2 mg Per G Tube Q24H     tacrolimus  2.5 mg Per G Tube Q24H     valGANciclovir  450 mg Oral Once per day on Mon Thu

## 2021-02-22 NOTE — PROGRESS NOTES
Pulmonary Medicine  Cystic Fibrosis - Lung Transplant Team  Progress Note  2021       Patient: Kecia Blue  MRN: [id number]  : 1960 (age 60 year old)  Transplant: 3/1/2018 (Lung), POD#[id number]  Admission date: 2021    Assessment & Plan:     Kecia Blue is a 60 year old female with PMH of BSLT () for ILD with antisynthetase sydrome, postoperative course c/b right mainstem bronchial stenosis s/p several dilations, left-sided Aspergillus empyema () s/p amphotericin beads (2020), EBV viremia, CMV viremia, and ESRD on HD .  Patient was admitted to OSH on  for acute hypoxemic respiratory failure and new lung opacities. Intubated  and transferred to [facility] for ongoing management.  Extubated  but reintubated - for progressive hypoxemia.  Rapid decompensation /3 with ARDS presentation requiring reintubation, prone positioning, and inhaled epoprostenol, suspected d/t worsening PJP. S/p Trach , and PEG-J ().  S/p dilation of right main by IP on .  Has remained stable on TD exclusively since bronch.      Today's recommendations:  - Follow bronch cultures from , no indication to resume ABX at this time  - TD exclusively, wean FiO2 and flow as tolerated  - Trach to be downsized by IP today  - Repeat CXR today  - Posaconazole level  (ordered)  - Tacro level today therapeutic, no dose change, repeat level   - VGCV for CMV ppx through  (ordered)  - Repeat EBV pending  - Recommend EP consult for eval of AF and determine if ablation would be appropriate     Acute hypoxemic respiratory failure:   Right post-obstructive Stenotrophomonas and Eikenella pneumonia:  PJP pneumonia:  Septic shock, Resolved:   Airway stenosis with distal plugging s/p right mainstem dilation ():  ARDS: Presented to OSH ED with increased SOB and hypoxia, intubated  with need for pressors, transferred to [facility].  PTA bronch (20) with moderate airway  obstruction and RML/RLL mucous plugging, cultures with Stenotrophomonas and Eikenella.  OSH CT chest with new multifocal GGO bilaterally and increased left loculated pleural effusion.  Repeat bronch (1/24) with RUL BAL with thick copious secretions to RML/RLL, PJP PCR positive.  Extubated on 1/26, but reintubated from 1/27-1/29 for progressive hypoxemia.  Continued to require significant PEEP, eventually decompensated on 2/3 and again reintubated.  Presentation consistent with ARDS (tx: prone positioning, inhaled epoprostenol, pressors).  CT chest (2/3) with progressive consolidation and possible superimposed infection findings, stable LLL chronic empyema.  Bronch on 2/11 with persistent right mainstem stenosis with moderate airway obstruction.  S/p trach 2/12 by IP.  S/p steroid burst with taper through 2/17 and 21 day Bactrim course for PJP through 2/15.  Hypoxia improving, TD exclusively since 2/19.  - Bronch cultures (2/19) with Staph epidermidis, continue to follow  - TD exclusively, wean FiO2 and flow as tolerated  - IP to see today to determine if pt. is ready for trach downsize, plan to transition to uncuffed [facility] #6  - SLP following for PMSV trials and eventual swallowing studies  - Chest physiotherapy QID and nebs: albuterol QID, Mucomyst BID, and Pulmicort BID  - Repeat CXR today grossly unchanged in regard to mixed opacities (personally reviewed)  - Repeat IP bronch in 6 weeks (~4/2)     Aspergillus empyema (left-sided): Noted 10/8/19, negative in November and again on admission.  CT scan on 7/17/20 with increased mass-like density, likely pleural-based, in LLL area s/p needle aspiration (8/13/20) with Aspergillus fumigatus on cultures s/p intrapleural amphotericin bead placement (11/20).  Follows with ID as OP.  LFTs stable on admission (ALP chronically mildly elevated).  CT chest with increased loculated left pleural effusion s/p thoracentesis (1/25), exudative with 87% neutrophils, very high LDL  (0179), cytology normal, AFB pleural cultures NGTD-stain negative.  - Posaconazole IV (transitioned from PO 2/9 d/t subtherapeutic level).  Repeat level (2/16) remains subtherapeutic, increased to TID dosing (2/18).  Repeat level 2/25 (ordered).  Plan for indefinite course.     S/p bilateral lung transplant for ILD 2/2 CTD: Last seen in pulmonary clinic 12/2. DSA (1/25) not detected.     Immunosuppression: On 2 drug IST d/t recurrent infections  - Tacrolimus 2 mg qAM/ 2.5 mg qPM (via G tube only).  Goal level 8-10. Level today 9.8, no dose adjustment.  Repeat level 2/25 (ordered).  - Chronic prednisone 5 mg qAM / 2.5 mg qPM (transitioned from burst steroids 2/18)     Prophylaxis:   - Bactrim qMWF for PJP ppx     H/o CMV viremia: CMV D+/R+.  CMV <137 (1/25).  - Continue VGCV for CMV ppx through 2/25 (one week after steroid burst completion, end date ordered)     EBV viremia: Level 12/9/20 mildly elevated to 10K (log 4) from prior 3K (3.5 log) on 9/9/20, repeat (1/25/21) decreased to 5619 copies (log of 3.8).  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 Chronic iHD M/Th via AVF with partial graft.  Not able to tolerate iHD 2/3 d/t hypotension, line placed and transitioned to CRRT 2/4. Now back to iHD, qMWF schedule.  - Dialysis management per nephrology     Atrial fibrillation w/ RVR:  H/o DVT:  H/o paroxysmal AF, most recent recurrence 1/30 with RVR (-140s), likely in the setting of acute pulmonary illness.  - Recommend EP consult for eval of AF and determine if ablation would be appropriate     Oropharyngeal candidiasis: White tongue plaque noted 2/1.  - Nystatin QID (2/1)    We appreciate the excellent care provided by the [place] [id number] team. Recommendations communicated via in person rounding and this note. Will continue to follow along closely, please do not hesitate to call with any questions or concerns.    Patient discussed  "with Dr. Christensen.    Hina Huff, DNP, APRN, CNP  Inpatient Nurse Practitioner  Pulmonary CF/Transplant     Subjective & Interval History:     Transferred out of ICU last evening.  Remains stable on TD 30% with no SOB at rest.  Stable production of secretions via trach, good consistency to allow for very superficial suction from end of trach.  HD run for 2.5L, limited some by afib.    Review of Systems:     C: No fever, no chills, + slightly decreased weight  INTEGUMENTARY/SKIN: No rash or obvious new lesions  ENT/MOUTH: No sore throat, no sinus pain, no nasal congestion or drainage  RESP: See interval history  CV: No chest pain, no palpitations, no peripheral edema, no orthopnea  GI: No nausea, no vomiting, + stable loose stools, no reflux symptoms  : + oliguria  MUSCULOSKELETAL: No myalgias, no arthralgias  ENDOCRINE: Blood sugars with adequate control  NEURO: No headache, no numbness or tingling  PSYCHIATRIC: Mood stable    Physical Exam:     Vital signs:  Temp: 98.5  F (36.9  C) Temp src: Oral BP: 116/67 Pulse: 91   Resp: 18 SpO2: 96 % O2 Device: Trach dome Oxygen Delivery: 20 LPM Height: 160 cm (5' 2.99\") Weight: 54.8 kg (120 lb 13 oz)  I/O:     Intake/Output Summary (Last 24 hours) at 2/22/2021 1144  Last data filed at 2/22/2021 1100  Gross per 24 hour   Intake 1600 ml   Output 2500 ml   Net -900 ml     Constitutional: Sitting up in bed,  at bedside, in no apparent distress.   HEENT: Eyes with pink conjunctivae, anicteric. Oral mucosa moist without lesions. Trach in place.  PULM: Mildly diminished air flow bilaterally. Prominent audible congestion t/o from superficial aspect of trach, no rhonchi, no wheezes. Non-labored breathing on TD 30%.  CV: Normal S1 and S2. Irregular. No murmur, gallop, or rub. No peripheral edema.   ABD: NABS, soft, nontender, nondistended.  PEG/J site not visualized.  MSK: Moves all extremities.  NEURO: Alert and oriented, conversant by mouthing words.   SKIN: Warm, dry. No " rash on limited exam.   PSYCH: Mood stable.     Lines, Drains, and Devices:  Peripheral IV 02/16/21 Right;Posterior Lower forearm (Active)   Site Assessment WDL 02/22/21 1200   Line Status Saline locked 02/22/21 1200   Phlebitis Scale 0-->no symptoms 02/22/21 1200   Infiltration Scale 0 02/22/21 1200   Infiltration Site Treatment Method  None 02/21/21 1600   Number of days: 6       Peripheral IV 02/19/21 Right;Anterior Upper forearm (Active)   Site Assessment WDL 02/22/21 1200   Line Status Saline locked 02/22/21 1200   Phlebitis Scale 0-->no symptoms 02/22/21 1200   Infiltration Scale 0 02/22/21 1200   Infiltration Site Treatment Method  None 02/21/21 1600   Number of days: 3       CVC Double Lumen 10/25/19 Right Internal jugular (Active)   Number of days: 486       Hemodialysis Vascular Access Arteriovenous fistula Left Arm (Active)   Site Assessment WDL;Bruit present;Thrill present 02/22/21 1200   Cannulation Needle Size 16 02/21/21 1045   Dressing Intervention New dressing 02/21/21 1340   Dressing Status Clean, dry, intact 02/22/21 1200   Verification by x-ray Not applicable 02/21/21 1600   Hand Off Report Yes 02/21/21 1340   Number of days: 28     Data:     LABS    CMP:   Recent Labs   Lab 02/22/21  0527 02/20/21  0503 02/19/21  0458 02/18/21  0430   * 132* 134 134   POTASSIUM 4.7 4.2 4.0 3.6   CHLORIDE 99 98 100 100   CO2 27 28 29 27   ANIONGAP 5 6 5 8   * 154* 102* 158*   BUN 38* 32* 46* 29   CR 2.53* 2.37* 3.14* 2.19*   GFRESTIMATED 20* 22* 16* 24*   GFRESTBLACK 23* 25* 18* 28*   CHELSEY 8.8 8.6 8.8 8.4*   MAG 1.6  --   --   --    PROTTOTAL  --  6.4*  --   --    ALBUMIN  --  2.4*  --   --    BILITOTAL  --  0.4  --   --    ALKPHOS  --  244*  --   --    AST  --  15  --   --    ALT  --  34  --   --      CBC:   Recent Labs   Lab 02/22/21  0527 02/20/21  0503 02/19/21  0458 02/18/21  0430   WBC 3.0* 2.5* 3.0* 3.2*   RBC 3.14* 3.13* 2.96* 2.87*   HGB 9.7* 10.0* 9.0* 8.8*   HCT 31.1* 31.5* 29.3* 28.3*    MCV 99 101* 99 99   MCH 30.9 31.9 30.4 30.7   MCHC 31.2* 31.7 30.7* 31.1*   RDW 21.2* 21.5* 21.4* 21.3*    217 207 209       INR:   Recent Labs   Lab 02/19/21  0458   INR 0.99       Glucose:   Recent Labs   Lab 02/22/21  1120 02/22/21  0758 02/22/21  0527 02/22/21  0333 02/21/21  2336 02/21/21 2012 02/21/21  1633 02/20/21  0503 02/20/21  0503 02/19/21  0458 02/19/21  0458 02/18/21  0430 02/18/21  0430 02/16/21  0612 02/16/21  0612   GLC  --   --  136*  --   --   --   --   --  154*  --  102*  --  158*  --  154*   BGM 88 152*  --  148* 143* 109* 139*   < >  --    < >  --    < >  --    < >  --     < > = values in this interval not displayed.       Blood Gas: No lab results found in last 7 days.    Culture Data   Recent Labs   Lab 02/19/21  0853   CULT Moderate growth  Staphylococcus epidermidis  *  Culture received and in progress.  Positive AFB results are called as soon as detected.    Final report to follow in 7 to 8 weeks.    Assayed at [facility] [address] [place] [id number] [phone]  Culture negative after 1 hour  PENDING       Virology Data:   Lab Results   Component Value Date    FLUAH1 Not Detected 01/24/2021    FLUAH3 Not Detected 01/24/2021    SR4905 Not Detected 01/24/2021    IFLUB Not Detected 01/24/2021    RSVA Not Detected 01/24/2021    RSVB Not Detected 01/24/2021    PIV1 Not Detected 01/24/2021    PIV2 Not Detected 01/24/2021    PIV3 Not Detected 01/24/2021    HMPV Not Detected 01/24/2021    HRVS Negative 01/24/2021    ADVBE Negative 01/24/2021    ADVC Negative 01/24/2021    ADVC Negative 12/23/2020    ADVC Negative 10/07/2019       Historical CMV results (last 3 of prior testing):  Lab Results   Component Value Date    CMVQNT <137 (A) 01/25/2021    CMVQNT 238 (A) 01/24/2021    CMVQNT 675 (A) 12/23/2020     Lab Results   Component Value Date    CMVLOG <2.1 01/25/2021    CMVLOG 2.4 (H) 01/24/2021    CMVLOG 2.8 (H) 12/23/2020       Urine Studies    Recent Labs   Lab Test  01/24/21  1729 10/21/19  2240   URINEPH 5.0 5.0   NITRITE Negative Negative   LEUKEST Moderate* Large*   WBCU 34* 115*       Most Recent [place] Pulmonary Function Testing (FVC/FEV1 only)  FVC-Pre   Date Value Ref Range Status   12/09/2020 1.12 L    09/09/2020 1.56 L    03/09/2020 1.57 L    02/19/2020 1.78 L      FVC-%Pred-Pre   Date Value Ref Range Status   12/09/2020 34 %    09/09/2020 47 %    03/09/2020 48 %    02/19/2020 54 %      FEV1-Pre   Date Value Ref Range Status   12/09/2020 0.98 L    09/09/2020 1.10 L    03/09/2020 0.96 L    02/19/2020 0.99 L      FEV1-%Pred-Pre   Date Value Ref Range Status   12/09/2020 37 %    09/09/2020 42 %    03/09/2020 37 %    02/19/2020 38 %        IMAGING    No results found for this or any previous visit (from the past 48 hour(s)).

## 2021-02-23 ENCOUNTER — APPOINTMENT (OUTPATIENT)
Dept: SPEECH THERAPY | Facility: CLINIC | Age: 61
End: 2021-02-23
Attending: INTERNAL MEDICINE
Payer: COMMERCIAL

## 2021-02-23 ENCOUNTER — APPOINTMENT (OUTPATIENT)
Dept: PHYSICAL THERAPY | Facility: CLINIC | Age: 61
End: 2021-02-23
Attending: INTERNAL MEDICINE
Payer: COMMERCIAL

## 2021-02-23 LAB
ANION GAP SERPL CALCULATED.3IONS-SCNC: 6 MMOL/L (ref 3–14)
BUN SERPL-MCNC: 58 MG/DL (ref 7–30)
CALCIUM SERPL-MCNC: 9.2 MG/DL (ref 8.5–10.1)
CHLORIDE SERPL-SCNC: 98 MMOL/L (ref 94–109)
CO2 SERPL-SCNC: 28 MMOL/L (ref 20–32)
CREAT SERPL-MCNC: 3.43 MG/DL (ref 0.52–1.04)
EBV DNA # SPEC NAA+PROBE: ABNORMAL {COPIES}/ML
EBV DNA SPEC NAA+PROBE-LOG#: 4.9 {LOG_COPIES}/ML
ERYTHROCYTE [DISTWIDTH] IN BLOOD BY AUTOMATED COUNT: 20.8 % (ref 10–15)
GFR SERPL CREATININE-BSD FRML MDRD: 14 ML/MIN/{1.73_M2}
GLUCOSE BLDC GLUCOMTR-MCNC: 101 MG/DL (ref 70–99)
GLUCOSE BLDC GLUCOMTR-MCNC: 117 MG/DL (ref 70–99)
GLUCOSE BLDC GLUCOMTR-MCNC: 129 MG/DL (ref 70–99)
GLUCOSE BLDC GLUCOMTR-MCNC: 153 MG/DL (ref 70–99)
GLUCOSE BLDC GLUCOMTR-MCNC: 90 MG/DL (ref 70–99)
GLUCOSE SERPL-MCNC: 140 MG/DL (ref 70–99)
HCT VFR BLD AUTO: 29.8 % (ref 35–47)
HGB BLD-MCNC: 9.2 G/DL (ref 11.7–15.7)
LACTATE BLD-SCNC: 0.8 MMOL/L (ref 0.7–2)
MCH RBC QN AUTO: 31.3 PG (ref 26.5–33)
MCHC RBC AUTO-ENTMCNC: 30.9 G/DL (ref 31.5–36.5)
MCV RBC AUTO: 101 FL (ref 78–100)
PLATELET # BLD AUTO: 253 10E9/L (ref 150–450)
POTASSIUM SERPL-SCNC: 5.3 MMOL/L (ref 3.4–5.3)
RBC # BLD AUTO: 2.94 10E12/L (ref 3.8–5.2)
SODIUM SERPL-SCNC: 132 MMOL/L (ref 133–144)
WBC # BLD AUTO: 3.3 10E9/L (ref 4–11)

## 2021-02-23 PROCEDURE — 250N000013 HC RX MED GY IP 250 OP 250 PS 637: Performed by: INTERNAL MEDICINE

## 2021-02-23 PROCEDURE — 999N000043 HC STATISTIC CTO2 CONT OXYGEN TECH TIME EA 90 MIN

## 2021-02-23 PROCEDURE — 36415 COLL VENOUS BLD VENIPUNCTURE: CPT | Performed by: INTERNAL MEDICINE

## 2021-02-23 PROCEDURE — 258N000003 HC RX IP 258 OP 636: Performed by: CLINICAL NURSE SPECIALIST

## 2021-02-23 PROCEDURE — 250N000009 HC RX 250: Performed by: STUDENT IN AN ORGANIZED HEALTH CARE EDUCATION/TRAINING PROGRAM

## 2021-02-23 PROCEDURE — 99233 SBSQ HOSP IP/OBS HIGH 50: CPT | Performed by: INTERNAL MEDICINE

## 2021-02-23 PROCEDURE — 94640 AIRWAY INHALATION TREATMENT: CPT

## 2021-02-23 PROCEDURE — 250N000012 HC RX MED GY IP 250 OP 636 PS 637: Performed by: INTERNAL MEDICINE

## 2021-02-23 PROCEDURE — 85027 COMPLETE CBC AUTOMATED: CPT | Performed by: INTERNAL MEDICINE

## 2021-02-23 PROCEDURE — 250N000013 HC RX MED GY IP 250 OP 250 PS 637: Performed by: STUDENT IN AN ORGANIZED HEALTH CARE EDUCATION/TRAINING PROGRAM

## 2021-02-23 PROCEDURE — 120N000003 HC R&B IMCU UMMC

## 2021-02-23 PROCEDURE — 97116 GAIT TRAINING THERAPY: CPT | Mod: GP

## 2021-02-23 PROCEDURE — 83605 ASSAY OF LACTIC ACID: CPT | Performed by: INTERNAL MEDICINE

## 2021-02-23 PROCEDURE — 999N001017 HC STATISTIC GLUCOSE BY METER IP

## 2021-02-23 PROCEDURE — 97110 THERAPEUTIC EXERCISES: CPT | Mod: GP

## 2021-02-23 PROCEDURE — 250N000009 HC RX 250: Performed by: CLINICAL NURSE SPECIALIST

## 2021-02-23 PROCEDURE — 99232 SBSQ HOSP IP/OBS MODERATE 35: CPT | Mod: GC | Performed by: INTERNAL MEDICINE

## 2021-02-23 PROCEDURE — 80048 BASIC METABOLIC PNL TOTAL CA: CPT | Performed by: INTERNAL MEDICINE

## 2021-02-23 PROCEDURE — 250N000012 HC RX MED GY IP 250 OP 636 PS 637: Performed by: NURSE PRACTITIONER

## 2021-02-23 PROCEDURE — 634N000001 HC RX 634: Performed by: CLINICAL NURSE SPECIALIST

## 2021-02-23 PROCEDURE — 94640 AIRWAY INHALATION TREATMENT: CPT | Mod: 76

## 2021-02-23 PROCEDURE — 97530 THERAPEUTIC ACTIVITIES: CPT | Mod: GP

## 2021-02-23 PROCEDURE — 250N000009 HC RX 250: Performed by: NURSE PRACTITIONER

## 2021-02-23 PROCEDURE — 999N000157 HC STATISTIC RCP TIME EA 10 MIN

## 2021-02-23 PROCEDURE — 250N000013 HC RX MED GY IP 250 OP 250 PS 637: Performed by: NURSE PRACTITIONER

## 2021-02-23 PROCEDURE — 92507 TX SP LANG VOICE COMM INDIV: CPT | Mod: GN

## 2021-02-23 PROCEDURE — 90937 HEMODIALYSIS REPEATED EVAL: CPT

## 2021-02-23 RX ADMIN — PREDNISONE 2.5 MG: 2.5 TABLET ORAL at 19:41

## 2021-02-23 RX ADMIN — POSACONAZOLE 300 MG: 40 SUSPENSION ORAL at 18:06

## 2021-02-23 RX ADMIN — BUDESONIDE 0.5 MG: 0.5 INHALANT ORAL at 19:47

## 2021-02-23 RX ADMIN — METOPROLOL TARTRATE 50 MG: 25 TABLET, FILM COATED ORAL at 08:03

## 2021-02-23 RX ADMIN — Medication 5 ML: at 07:51

## 2021-02-23 RX ADMIN — ALBUTEROL SULFATE 2.5 MG: 2.5 SOLUTION RESPIRATORY (INHALATION) at 08:11

## 2021-02-23 RX ADMIN — ACETYLCYSTEINE 2 ML: 200 SOLUTION ORAL; RESPIRATORY (INHALATION) at 08:11

## 2021-02-23 RX ADMIN — INSULIN ASPART 1 UNITS: 100 INJECTION, SOLUTION INTRAVENOUS; SUBCUTANEOUS at 03:29

## 2021-02-23 RX ADMIN — OLANZAPINE 10 MG: 2.5 TABLET ORAL at 19:42

## 2021-02-23 RX ADMIN — SODIUM CHLORIDE 250 ML: 9 INJECTION, SOLUTION INTRAVENOUS at 13:15

## 2021-02-23 RX ADMIN — Medication 1 PACKET: at 19:43

## 2021-02-23 RX ADMIN — APIXABAN 2.5 MG: 2.5 TABLET, FILM COATED ORAL at 19:42

## 2021-02-23 RX ADMIN — ACETYLCYSTEINE 2 ML: 200 SOLUTION ORAL; RESPIRATORY (INHALATION) at 19:47

## 2021-02-23 RX ADMIN — Medication: at 13:16

## 2021-02-23 RX ADMIN — INSULIN GLARGINE 10 UNITS: 100 INJECTION, SOLUTION SUBCUTANEOUS at 07:51

## 2021-02-23 RX ADMIN — POSACONAZOLE 300 MG: 40 SUSPENSION ORAL at 19:41

## 2021-02-23 RX ADMIN — TACROLIMUS 2 MG: 5 CAPSULE ORAL at 07:51

## 2021-02-23 RX ADMIN — ALBUTEROL SULFATE 2.5 MG: 2.5 SOLUTION RESPIRATORY (INHALATION) at 19:47

## 2021-02-23 RX ADMIN — Medication 1 PACKET: at 07:52

## 2021-02-23 RX ADMIN — POSACONAZOLE 300 MG: 40 SUSPENSION ORAL at 07:51

## 2021-02-23 RX ADMIN — BUDESONIDE 0.5 MG: 0.5 INHALANT ORAL at 08:11

## 2021-02-23 RX ADMIN — APIXABAN 2.5 MG: 2.5 TABLET, FILM COATED ORAL at 07:51

## 2021-02-23 RX ADMIN — PREDNISONE 5 MG: 5 TABLET ORAL at 07:53

## 2021-02-23 RX ADMIN — Medication 1 PACKET: at 19:44

## 2021-02-23 RX ADMIN — METOPROLOL TARTRATE 50 MG: 25 TABLET, FILM COATED ORAL at 19:42

## 2021-02-23 RX ADMIN — SODIUM CHLORIDE 300 ML: 9 INJECTION, SOLUTION INTRAVENOUS at 13:14

## 2021-02-23 RX ADMIN — Medication 1 PACKET: at 07:54

## 2021-02-23 RX ADMIN — LIDOCAINE HYDROCHLORIDE 0.5 ML: 10 INJECTION, SOLUTION EPIDURAL; INFILTRATION; INTRACAUDAL; PERINEURAL at 13:16

## 2021-02-23 RX ADMIN — ALBUTEROL SULFATE 2.5 MG: 2.5 SOLUTION RESPIRATORY (INHALATION) at 13:07

## 2021-02-23 RX ADMIN — Medication 40 MG: at 07:51

## 2021-02-23 RX ADMIN — EPOETIN ALFA-EPBX 4000 UNITS: 10000 INJECTION, SOLUTION INTRAVENOUS; SUBCUTANEOUS at 13:15

## 2021-02-23 RX ADMIN — TACROLIMUS 2.5 MG: 5 CAPSULE ORAL at 18:06

## 2021-02-23 ASSESSMENT — ACTIVITIES OF DAILY LIVING (ADL)
ADLS_ACUITY_SCORE: 16

## 2021-02-23 NOTE — PLAN OF CARE
Neuro: Patient alert and oriented x4. Non verbal communication.   Cardiac: NSR, a-flutter at times. HR 90s. BP' stable. Afebrile.            Respiratory: Sating >92% on 30% trach dome. Shiley 8 cuffed changed to shiley 6 cuffless. Suctioned 3 x's in 12 hours. Now with blood tinged secretions.   GI/: Oliguric. BM x1 this shift.   Diet/appetite: NPO. GJ, g clamped, j w/ tube feeds @ 50mL/hr.   Activity:  Assist of 1, up in room. Independently repositioning.   Pain: Denies.   Skin: Blanchable redness noted to coccyx.   LDA's: PIV x2.   Plan: Continue with POC. Notify primary team with changes.

## 2021-02-23 NOTE — PROGRESS NOTES
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  Postoperative course was complicated by right mainstem bronchial stenosis requiring several dilations, left-sided aspergillus empyema in 2019, EBV viremia, CMV viremia, and ESRD on HD Monday/Thursday.  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  Respiratory status worsened, and she was intubated and transferred to [facility] on 1/24.  She had decompensation to ARDS on 2/3.  Now s/p tracheostomy on 2/12 and PEG-J on 2/16.  Also s/p dilation of right main bronchus on 2/19.  Stable on trach dome after bronchoscopy, transferred to medicine floor 2/21.     Acute hypoxemic respiratory failure  Sepsis in setting of PJP pneumonia, possible secondary pneumonia  S/p tracheostomy placement (2/12/21)  Presented to outside hospital as mentioned above with acute hypoxemic respiratory failure secondary to PJP pneumonia.  She failed extubation trials x2, developed ARDS.  Required proning, inhaled epoprostenol, pressors.  Then required tracheostomy, which was placed on 2/12.  Also completed a course of steroids with a taper through 2/17 and course of Bactrim X 21 days.  Bronchoscopy on 2/19 and requiring only trach dome since that time.  2/22 #8 Shiley trach exchanged for #6 cuffless shiley per IP     -Cultures from bronchoscopy on 2/19 growing staph epi.  Continuing to follow per pulmonary.    -Continuing chest physiotherapy and nebs: Albuterol, Mucomyst, Pulmicort    -Pulmonary recommends repeat bronchoscopy in 6 weeks (~4/2)    -Work with speech therapy, passy shakir valve trials  - Video swallow eval  ordered for tomorrow  -Patient to learn how to self suction herself, only superficial secretions per pulmonary. In preparation for discharge to ARU.      S/p bilateral lung transplant  ILD with antisynthetase sydrome s/p BSLT 03/2018 (CMV D+/R+, EBV D+/R+) postoperatively complicated by right mainstem bronchial stenosis s/p several dilations (with 80% narrowing at anastamosis on bronch on 2/11), 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    -Continue Tacro 2 mg every morning and 2.5 mg every evening.  Tacro level ordered for 2/22. Appreciate pulmonary assistance in dosing.     -Prednisone 5 mg every morning and 2.5 mg every evening    -Continues on Bactrim every Monday, Wednesday, Friday for PJP ppx    -Continue VGCV for CMV ppx through 2/24 (due to previous high dose steroids)     L Aspergillus Empyema   -Continue pozaconazole, plan for indefinite course      ESRD  Prior to hospitalization, HD biweekly. Required CRRT 2/4 to 2/8.  Then transition to intermittent HD.  Access is AVF with partial graft, challenging to access.     -Orders per nephrology team.      -I/O    -Daily weights    -Avoid nephrotoxic medications    -Receives Epogen with HD      Atrial fibrillation with RVR    - EP consulted, appreciate recommendations  - No need for cardioversion or other medical therapy at present as it is intermittent  - Plan for follow up with EP outpatient with zio patch prior   - Continue metoprolol, apixaban.      Additional medical concerns:    hx anxiety: bedtime and as needed olanzapine, hydroxyzine prn  hx anisocoria: no deficits on exam, negative CTH. Monitor.  hx HTN: holding pta meds  seronegative RA with Raynaud's: stable. Monitor.          Diet: Adult Formula Drip Feeding: Continuous Nutren 1.5; Jejunostomy; Goal Rate: 50; mL/hr; Medication - Feeding Tube Flush Frequency: At least 15-30 mL water before and after medication administration and with tube clogging; Amount to Send  (Nutrition u...  NPO for Medical/Clinical Reasons Except for: Meds    DVT Prophylaxis: apixaban  Basilio Catheter: not present  Code Status: Full Code           Disposition Plan   Expected discharge: 2 - 3 days, recommended to transitional care unit once SLP eval complete, remains stable from respiratory stand point.   Entered: Leelee Huang MD 02/22/2021, 8:22 PM       The patient's care was discussed with the Bedside Nurse, Patient and Patient's Family.    Leelee Huang MD  Hospitalist Service, [address]  Contact information available via [facility] Paging/Directory  Please see sign in/sign out for up to date coverage information  ______________________________________________________________________    Interval History   Doing well today  No SOB, chest pain   Looking forward to getting out of the hospital, feels like things are going well     Data reviewed today: I reviewed all medications, new labs and imaging results over the last 24 hours. I personally reviewed no images or EKG's today.    Physical Exam   Vital Signs: Temp: 98.2  F (36.8  C) Temp src: Oral BP: 112/72 Pulse: 99   Resp: 16 SpO2: 98 % O2 Device: Trach dome Oxygen Delivery: 25 LPM  Weight: 120 lbs 12.99 oz  Constitutional: no apparent distress, pleasant and cooperative   Cardiovascular: regular rate and rhythm, normal S1 and S2, no murmurs, rubs, or gallops noted, no bilateral lower extremity edema   Respiratory: coarse breath sounds and diminished throughout, no wheezing, rales, or rhonchi, normal work of breathing   GI: abdomen soft, non tender, non distended, bowel sounds present, PEG-J tube in place without erythema or drainage at site   Neuro: alert and oriented, no gross focal deficits noted     Data   Recent Labs   Lab 02/22/21  0527 02/20/21  0503 02/19/21  0458   WBC 3.0* 2.5* 3.0*   HGB 9.7* 10.0* 9.0*   MCV 99 101* 99    217 207   INR  --   --  0.99   * 132* 134    POTASSIUM 4.7 4.2 4.0   CHLORIDE 99 98 100   CO2 27 28 29   BUN 38* 32* 46*   CR 2.53* 2.37* 3.14*   ANIONGAP 5 6 5   CHELSEY 8.8 8.6 8.8   * 154* 102*   ALBUMIN  --  2.4*  --    PROTTOTAL  --  6.4*  --    BILITOTAL  --  0.4  --    ALKPHOS  --  244*  --    ALT  --  34  --    AST  --  15  --      Recent Results (from the past 24 hour(s))   XR Chest Port 1 View    Narrative    EXAM: XR CHEST PORT 1 VW  2/22/2021 11:53 AM     HISTORY:  Interval f/u       COMPARISON:  Chest x-ray 2/18/2021    FINDINGS:   Semiupright portable AP view of the chest. Postoperative changes of  bilateral lung transplant with median sternotomy wires intact.  Tracheostomy tube with tip projecting in the mid thoracic trachea. Low  inspiratory volumes. Cardiac silhouette is largely obscured.    No significant change in diffuse mixed interstitial and airspace  opacities. Unchanged right basilar opacity.      Impression    IMPRESSION:   No significant change to diffuse mixed interstitial and airspace  opacities or right basilar atelectasis with probable effusion.    I have personally reviewed the examination and initial interpretation  and I agree with the findings.    ADAM GONZALEZ MD     Medications     dextrose Stopped (02/16/21 1644)     sodium chloride 10 mL/hr at 02/08/21 0400       acetylcysteine  2 mL Nebulization BID     albuterol  2.5 mg Nebulization 4x daily     apixaban ANTICOAGULANT  2.5 mg Oral BID     B and C vitamin Complex with folic acid  5 mL Oral or Feeding Tube Daily     budesonide  0.5 mg Nebulization BID     fiber modular (NUTRISOURCE FIBER)  1 packet Per Feeding Tube BID     insulin aspart  1-12 Units Subcutaneous Q4H     insulin glargine  10 Units Subcutaneous QAM AC     metoprolol tartrate  50 mg Oral or Feeding Tube BID     OLANZapine  10 mg Oral or Feeding Tube Q24H     pantoprazole  40 mg Oral QAM AC     posaconazole  300 mg Per G Tube TID     predniSONE  2.5 mg Oral or Feeding Tube QPM     predniSONE  5 mg Oral  or Feeding Tube QAM     protein modular  1 packet Per Feeding Tube BID     sulfamethoxazole-trimethoprim  10 mL Oral or Feeding Tube Once per day on Mon Wed Fri     tacrolimus  2 mg Per G Tube Q24H     tacrolimus  2.5 mg Per G Tube Q24H     valGANciclovir  450 mg Oral Once per day on Mon Thu

## 2021-02-23 NOTE — INTERIM SUMMARY
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  She was admitted to an outside hospital on 1/22 with acute hypoxemic respiratory failure and new lung opacities on imaging, 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  She was subsequently transferred to [facility] on 1/24/21.  She required intubation 1/24-1/26 and 1/27/21- 1/29/21 d/t progressive hypoxemia and increased infiltrates on imaging.    She had rapid decompensation to ARDS on 2/3 requiring reintubation, prone positioning, inhaled Velitri, and pressors.  She is now s/p tracheostomy on 2/12 (downsized to cuffless Shiley #6 on 2/22) and PEG-J on 2/16/21.  She is also s/p dilation of right main bronchus on 2/19. Her hospital course was also c/b septic shock, Atrial fibrillation w/ RVR, left Aspergillus empyema, steroid induced hyperglycemia, delirium, and oropharyngeal candidiasis. She is now medically stable and ready to discharge to acute inpatient rehab.   The patient requires an intensive inpatient rehab program to address the following acute impairments: impaired ROM, impaired strength, impaired activity tolerance, impaired balance, fatigue, DESHPANDE, and dysphagia. These impairments are impacting her safety and functional independence w/ transfers, gait, stairs, ADLs, IADLs, swallowing, and communication.     Current Active Medical Management Needs/Risks for Clinical Complications  The patient requires the high level of rehabilitation physician supervision that  accompanies the provision of intensive rehabilitation therapy.  The patient needs the services of the rehabilitation physician to assess the patient medically and functionally and to modify the course of treatment as needed to maximize the patient's capacity to benefit from the rehabilitation process.  The patient will require medical mgmt and assessment of:    Respiratory status in setting of acute hypoxic respiratory failure, ARDS, PJP pneumonia, and s/p tracheostomy placement: transition off continuous TD to humidified nasal cannula w/ TD overnight (may cover trach w/ PMSV or HME during the day; it is not to be left uncovered); wean trach and O2 needs as appropriate; promote pulmonary hygiene; chest physiotherapy BID; Albuterol QID, Mucomyst BID, Pulmicort BID; CXR weekly (next 3/1); repeat bronchoscopy in 6 weeks (~4/2).    Immunosuppression and prophylaxis in setting of prior B lung transplantation: Tacrolimus 2 mg every morning, 2.5 mg every evening via G tube only (Goal 8-20), w/ repeat level 2/25; Prednisone 5mg every morning and 2.5 mg every evening; Bactrim every M/W/F from PJP ppx; VGCV from CMV ppx thru 2/25. Recheck EBV in 4 weeks (3/22)    L Aspergillus Empyema: Posaconazole, plan for indefinite course (next level 2/25).    Renal function, fluid and electrolyte balance in setting of ESRD: I/O, daily weights, avoid nephrotoxic medications, Epogen and Midodrine with HD. Currently HD Tues/Thurs/Sat.    Cardiac function in setting of HTN and Afib w/ RVR: on Metoprolol, Apixaban; needs Ziopatch placed as an OP after discharge from [facility].    Steroid induced hyperglycemia: Lantus 10 units daily, sliding scale insulin Novolog.    Nutritional status in setting of severe malnutrition, now s/p PEG-J: cycled tube feeds, regular diet w/ thin liquids (renal diet, non-dialysis), Ensure Enlive @ 10:00 (chocolate or vanilla); monitor nutritional intake and wean supplemental tube feedings as appropriate.    Mental health  in setting of delirium, anxiety, and prolonged hospitalization w/ critical illness: consult health psychology as appropriate, on Zyprexa.    The patient is at risk for respiratory decompensation, DVT, falls w/ injury, and aspiration.     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 - postoperative course was complicated by right mainstem bronchial stenosis requiring several dilations, left-sided aspergillus empyema in 2019, EBV viremia, CMV viremia; HTN, ESRD on dialysis Monday/Thursday, portal hypertension, Seronegative RA w/ Raynaud's, anemia r/t renal disease, dermatomyositis antitryptase syndrome, osteopenia, pulmonary hypertension    Level of Functioning Prior to Admission:  LIVING ENVIRONMENT  People in home: spouse Ranjan  Current Living Arrangements: house  Home Accessibility: no concerns, ramp to enter, walk in shower  Transportation Anticipated: family or friend will provide, car, drives self  Living Environment Comments: Home has ramp enterance and patient is able to stay on main level. Spouse reports patient has been frequently sick since lung transplant ~3 years ago and with limited ability to participate in therapy on OP basis.     SELF-CARE  Usual Activity Tolerance: prior to admission, independent in cares, but tired easily.  Regular Exercise: No  Equipment Currently Used at Home: none  Activity/Exercise/Self-Care Comment: Spouse reports that patient is unable to navigate stairs at baseline and can walk to/from garage (~50 feet) prior to noting fatigue. In EMR patient with recent 6MWT (~1.5 moths ago) with 650' ambulatory distance.     DISABILITY/FUNCTION  Concentrating, Remembering or Making Decisions Difficulty: no  Difficulty Communicating: no    Difficulty Eating/Swallowing: no  Walking or Climbing Stairs Difficulty: yes, unable to navigate stairs at baseline    Dressing/Bathing Difficulty: no    Toileting issues: no  Doing Errands Independently Difficulty (such as shopping): no  Fall history within last six months: no;    Change in Functional Status Since Onset of Current Illness/Injury: yes    Level of Function: GG Scale (Section GG Functional Ability and Goals; CMS's ABAD Version 3.0 Manual effective 10.1.2019):  PT Current Function Goals for Rehab   Bed Rolling 6 Independent 6 Independent   Supine to Sit 6 Independent 6 Independent   Sit to Stand 3 Partial/moderate assistance 6 Independent   Transfer 3 Partial/moderate assistance 6 Independent   Ambulation 1 Dependent 6 Independent   Stairs Not completed 4 Supervision or touching assitance     OT Current Function Goals for Rehab   Feeding 5 Setup or clean-up assistance 5 Setup or clean-up assistance   Grooming 5 Setup or clean-up assistance 6 Independent   Bathing 2 Substantial/maximal assistance 4 Supervision or touching assitance   Upper Body Dressing Not completed 6 Independent   Lower Body Dressing Not completed 6 Independent   Toileting 2 Substantial/maximal assistance 6 Independent   Toilet Transfer 3 Partial/moderate assistance 6 Independent   Tub/Shower Transfer 3 Partial/moderate assistance 5 Setup or clean-up assistance   Cognition Not Assessed Independent     SLP Current Function Goals for Rehab   Swallow Impaired Tolerate least restrictive diet without signs & symptoms of aspiration and adhere to safe swallow strategies   Communication Impaired Communicate basic needs effectively       Current Diet:  Regular diet, Thin liquids, renal diet and Cycled tube feeds; meds whole in puree on 2/24/2021.    Summary Statement:  Kecia performs supine > sit mod IND w/ HOB elevated.  She  performs sit <> stand transfers from EOB with FWW and CGA, however requires FWW and Yoanna for sit <> stand transfers from Nustep bike and standard high-back chair + cues for technique.  Pt ambulates 2 bouts of ~200ft with FWW, CGA, and  w/c follow. She performed alternating toe taps in preparation for stair navigation w/ B hand rails and CGA for balance w/ fatigue noted. She performs 2 bouts of 5 continuous minutes of aerobic exercise on the Nustep limited by fatigue and mild DESHPANDE (stable on 1-2LPM O2). She requires cues for breathing technique to optimize pulmonary function during functional activities. She performs standing at the sink for ADLs w/ 6-8 minutes tolerance, limited by fatigue, lightheadedness and DESHPANDE. She required assist to wash her hair. Pt able to brush hair w/ SBA in seated position.  Kecia completed a VFSS on 2/24/21 and was advanced to a regular diet w/ thin liquids. She requires f/u for safe swallowing strategies. She should have PMSV on for all PO intake.  Pt wore PMSV for 20 min with SLP present with stable vitals (O2 saturation 95% and above) and strong, conversational level voicing. She would benefit from ongoing SLP to improve communication effectiveness w/ PMSV/tracheostomy. She is able to don/doff PMSV and understands guidelines for use w/ good accuracy.    Expected Therapies and Services Required During Inpatient Rehab Admission  Intensity of Therapy: Patient requires intensive therapies not available in a lesser level of care. Patient is motivated, making gains, and can tolerate 3 hours of therapy a day.  Physical Therapy: 60 minutes per day, 7 days a week for 14 days  Occupational Therapy: 60 minutes per day, 7 days a week for 14 days  Speech and Language Therapy: 60 minutes per day, 7 days a week for 14 days  Rehabilitation Nursing Needs: Patient requires 24 hour Rehab Nursing to manage vitals, medication education, positioning, carryover of new rehab techniques, care coordination, skin integrity, blood sugar management, pain management, provide safe environment for patient at falls risk, monitor nutritional intake and wean supplemental oxygen and tracheostomy as appropriate; wean supplemental tube feeds as  appropriate. .    Precautions/restrictions/special needs:  Precautions: fall precautions and isolation precautions  Restrictions: trach, PEG tube, supplemental O2 as indicated  Special Needs: isolation, trach, hemodialysis, oxygen and Transplant   Designated visitor:  Ranjan    Expected Level of Improvement: She will achieve a level of mod I for transfers, gait, ADLs, have SBA-CGA for stairs, shower transfers and showering. She will also improve her swallowing and communication.  Expected Length of time to achieve: 14 days    Anticipated Discharge Needs:  Anticipated Discharge Destination: Home  Anticipated Discharge Support: Family member  24/7 support available : Yes  Identified caregiver(s):  spouse  Anticipated Discharge Needs: Home with homecare, Outpatient Cardiac/Pulmonary Rehab, Dialysis, Tube Feeds and Trach    Identified challenges/barriers: Goal would be to wean trach prior to discharge home if indicated; progress diet to limit need for tube feeds; will need to be strong enough to tolerate trips to dialysis on 3x/week schedule.    Rehab Liaison Signature:     Physician statement of review and agreement:  I have reviewed and am in agreement of the need for IRF stay to address above functional and medical needs. In addition to above statements address, Patient requires intensive active and ongoing therapeutic intervention and multiple therapies; Patient requires medical supervision; Expected to actively participate in the intensive rehab program; Sufficiently stable to actively participate; Expectation for measurable improvement in functional capacity or adaption to impairments.    Physician Signature/Date/Time:

## 2021-02-23 NOTE — PLAN OF CARE
Neuro: A&Ox4.   Cardiac: SR. 80-90s. VSS.   Respiratory: Sating >92% on 30% trach dome. Suctioned x2 this shift.  GI/: Oliguric. BM X2 this shift.  Diet/appetite: NPO. GJ, g clamped, j tube w/ tube feeds @50mL/hr.   Activity:  Assist of 1, up in room. Independently repositioning.   Pain: Denies.   Skin: No new deficits noted.  LDA's: Shiley 6 cuffless. PIV x2. GJ.     Plan: Continue with POC. Notify primary team with changes.

## 2021-02-23 NOTE — PROGRESS NOTES
Nephrology Consult Daily Note  02/23/2021          Medical Student Note MARIA A Attending Note   Assessment and Recommendation (Student)    Assessment:  Mrs Blue is a 60 yof with ESRD and hx of lung tx admitted with PNA. Had been on 2x/week HD prior to admission, now admitted on 1/24/2021 for management of resp failure.  Nephrology consulted for management of dialysis.       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 Nephrology consutled for dialysis needs.     -Plan for next HD today, 3L of UF, further details below.          Melissa Estevez on 2/23/2021 at 10:18 AM   Seen and discussed with Dr Yeung     Recommendations were communicated to primary team via verbal communication.      Hardy Tran, ADRIÁN CNS  Clinical Nurse Specialist  [phone]          Medical Student Interval History MARIA A Interval History   Medical student: Interval History  Mrs Blue was up walking the unit this AM. Will plan for HD run this afternoon, trying to pull 2-3L off, maybe closer to 3L. Will continue to run 3 times per week. Working toward discharging to rehab, maybe sometime later this week.     Assessment and Recommendations:  ESRD-secondary to tacro use, has been on HD close to a year, has AVF with partial graft, difficult access but works well. Was running Monday /Thursday, will likely need to run 3 times/week moving forward.   Volume Status: edema mostly improved, weight similar to admission weight. Will plan to pull 2-3L of UF with run today.   Electrolytes: K 5.3, bicarb 28    Ca/phos/pth: Ca 9.2, phos not checked recently     Anemia: Hgb 9.2 dropped  "slightly, will check again tomorrow, also adding EPO 4K with runs.     Nutrition: Nutren TF     Review of Systems  Gen: No fevers or chills  CV: No CP at rest  Resp: No SOB at rest  GI: No N/V Interval History :   Mrs Blue had day off of HD yesterday, seen walking in the [place] today and doing very well.  Planned for HD today on TTS schedule, awaiting final discharge plan, likely ARU in which case she will continue to run HD at [facility]      Assessment & Recommendations:   ESRD-Runs at [place] through [facility] group.  Has atypical HD schedule of Monday and Thursday, has AVF with partial graft which has been challenging to access per RN's.  Needed CRRT 2/5-2/8 due to hemodynamic instability but otherwise has run iHD.  Working on getting to stable 3x/week schedule, may be progressing to trach for resp failure.                   -HD today, goal 3L of UF               -Has L arm AVF/graft, somewhat challenging access.                -Removed temp HD line on 2/9.        Volume status-Has mild abdominal edema but improving, trying for 2-3L of UF with runs.       Electrolytes/pH-K 5.3, bicarb 28.      Ca/phos/pth-Ca 9.2, Mg and Phos mildly up last check.      Anemia-Hgb 9.2 after 1u PRBC's this am.  Checked iron stores and sats 36% on 2/3, starting EPO 4k with runs.       Nutrition-Nutren TF.         Review of Systems:   Gen: No fevers or chills  CV: No CP at rest  Resp: No SOB at rest  GI: No N/V        Physical Exam (Student)  Vitals were reviewed  /72 (BP Location: Right arm)   Pulse 93   Temp 98.5  F (36.9  C) (Oral)   Resp 20   Ht 1.6 m (5' 2.99\")   Wt 54.8 kg (120 lb 13 oz)   LMP 06/07/2014 (Exact Date)   SpO2 95%   BMI 21.41 kg/m        Intake/Output Summary (Last 24 hours) at 2/23/2021 1018  Last data filed at 2/23/2021 1000  Gross per 24 hour   Intake 1760 ml   Output --   Net 1760 ml      GENERAL APPEARANCE:  trached.   EYES: no scleral icterus  Endo: no moon facies  Pulmonary: equal " "expansion.    CV: regular rhythm, normal rate  GI: soft, non distended  MS: no evidence of inflammation in joints, no muscle tenderness  :  no rivas, not making urine  SKIN: warm, dry, +1 edema.    NEURO: No focal deficits.    Physical Exam (Physician)  Vitals were reviewed  /72 (BP Location: Right arm)   Pulse 93   Temp 98.5  F (36.9  C) (Oral)   Resp 20   Ht 1.6 m (5' 2.99\")   Wt 54.8 kg (120 lb 13 oz)   LMP 06/07/2014 (Exact Date)   SpO2 95%   BMI 21.41 kg/m       Intake/Output Summary (Last 24 hours) at 2/23/2021 1018  Last data filed at 2/23/2021 1000  Gross per 24 hour   Intake 1760 ml   Output --   Net 1760 ml          GENERAL APPEARANCE: Vent via trached.   EYES: no scleral icterus  Endo: no moon facies  Pulmonary: Intubated, proned, equal expansion.    CV: regular rhythm, normal rate - Edema present  GI: soft, non distended  MS: no evidence of inflammation in joints, no muscle tenderness  :  Rivas present  SKIN: warm, dry, +1 edema.    NEURO: No focal deficits.      Labs:   The following labs were reviewed:   CMP  Recent Labs   Lab 02/23/21 0513 02/22/21 0527 02/20/21  0503 02/19/21  0458   * 131* 132* 134   POTASSIUM 5.3 4.7 4.2 4.0   CHLORIDE 98 99 98 100   CO2 28 27 28 29   ANIONGAP 6 5 6 5   * 136* 154* 102*   BUN 58* 38* 32* 46*   CR 3.43* 2.53* 2.37* 3.14*   GFRESTIMATED 14* 20* 22* 16*   GFRESTBLACK 16* 23* 25* 18*   CHLESEY 9.2 8.8 8.6 8.8   MAG  --  1.6  --   --    PROTTOTAL  --   --  6.4*  --    ALBUMIN  --   --  2.4*  --    BILITOTAL  --   --  0.4  --    ALKPHOS  --   --  244*  --    AST  --   --  15  --    ALT  --   --  34  --      CBC  Recent Labs   Lab 02/23/21 0513 02/22/21 0527 02/20/21  0503 02/19/21  0458   HGB 9.2* 9.7* 10.0* 9.0*   WBC 3.3* 3.0* 2.5* 3.0*   RBC 2.94* 3.14* 3.13* 2.96*   HCT 29.8* 31.1* 31.5* 29.3*   * 99 101* 99   MCH 31.3 30.9 31.9 30.4   MCHC 30.9* 31.2* 31.7 30.7*   RDW 20.8* 21.2* 21.5* 21.4*    241 217 207     INR  Recent " Labs   Lab 02/19/21  0458   INR 0.99     ABGNo lab results found in last 7 days.   URINE STUDIES  Recent Labs   Lab Test 01/24/21  1729 10/21/19  2240 09/12/19  0125 08/07/19  1512   COLOR Yellow Yellow Light Yellow Yellow   APPEARANCE Slightly Cloudy Cloudy Clear Clear   URINEGLC Negative Negative Negative Negative   URINEBILI Negative Negative Negative Negative   URINEKETONE 5* Negative 10* Negative   SG 1.023 1.018 1.008 1.016   UBLD Small* Trace* Negative Negative   URINEPH 5.0 5.0 7.5* 7.0   PROTEIN 30* 30* 30* 100*   NITRITE Negative Negative Negative Negative   LEUKEST Moderate* Large* Negative Trace*   RBCU 26* 25* <1 10*   WBCU 34* 115* 3 19*     Recent Labs   Lab Test 08/07/19  1512   UTPG 2.47*     PTH  No lab results found.  IRON STUDIES  Recent Labs   Lab Test 02/03/21  0415 12/13/18  1033 08/01/18  0921 05/08/18  0709   IRON 51 16* 93 48   * 221* 248 275   IRONSAT 36 7* 37 18   YOLA  --  302* 571*  --      Imaging:      Current Medications:    sodium chloride 0.9%  250 mL Intravenous Once in dialysis     sodium chloride 0.9%  300 mL Hemodialysis Machine Once     acetylcysteine  2 mL Nebulization BID     albuterol  2.5 mg Nebulization 4x daily     apixaban ANTICOAGULANT  2.5 mg Oral BID     B and C vitamin Complex with folic acid  5 mL Oral or Feeding Tube Daily     budesonide  0.5 mg Nebulization BID     epoetin alisha-epbx (RETACRIT) inj ESRD  4,000 Units Intravenous Once in dialysis     fiber modular (NUTRISOURCE FIBER)  1 packet Per Feeding Tube BID     gelatin absorbable  1 each Topical During Hemodialysis (from stock)     insulin aspart  1-12 Units Subcutaneous Q4H     insulin glargine  10 Units Subcutaneous QAM AC     metoprolol tartrate  50 mg Oral or Feeding Tube BID     - MEDICATION INSTRUCTIONS -   Does not apply Once     OLANZapine  10 mg Oral or Feeding Tube Q24H     pantoprazole  40 mg Oral QAM AC     posaconazole  300 mg Per G Tube TID     predniSONE  2.5 mg Oral or Feeding Tube QPM      predniSONE  5 mg Oral or Feeding Tube QAM     protein modular  1 packet Per Feeding Tube BID     sulfamethoxazole-trimethoprim  10 mL Oral or Feeding Tube Once per day on Mon Wed Fri     tacrolimus  2 mg Per G Tube Q24H     tacrolimus  2.5 mg Per G Tube Q24H     valGANciclovir  450 mg Oral Once per day on Mon Thu       dextrose Stopped (02/16/21 1644)     sodium chloride 10 mL/hr at 02/08/21 0400     - MEDICATION INSTRUCTIONS -       Melissa Estevez I, Chey Yeung, saw and evaluated this patient as part of a shared visit.  I have reviewed and discussed with the advanced practice provider their history, physical and plan.    I personally reviewed the vital signs, medications, labs and imaging.    My key history or physical exam findings:  Lung tx, ESRD, on dialysis  Key management decisions made by me:  Dialysis today for volume and solute control    Chey Yeung  Date of Service (when I saw the patient): 2/23

## 2021-02-23 NOTE — PROGRESS NOTES
HEMODIALYSIS TREATMENT NOTE    Date: 2/23/2021  Time: 3:41 PM    Data:  Pre Wt: 54.8 kg (120 lb 13 oz)   Desired Wt: 51.8 kg   Post Wt: 51.8 kg (115 lb 4.8 oz)  Weight change: 3 kg  Ultrafiltration - Post Run Net Total Removed (mL): 3000 mL  Vascular Access Status: patent  Dialyzer Rinse: Light  Total Blood Volume Processed: 69.6 L Liters  Total Dialysis (Treatment) Time: 3.5 hour    Lab:   no    Interventions:Assessments  Pt dialyzed today for 3hrs on a K-2 Ca-2.5 bath via LAVF. 400Qb throughout. 3kgs of fluid removed. VSS throuhout. Epo given on run. Hemostasis achieved in 5 minutes. Report to PCN post run. See Epic for further run details.     Plan:    Per renal

## 2021-02-23 NOTE — PROGRESS NOTES
Pulmonary Medicine  Cystic Fibrosis - Lung Transplant Team  Progress Note  2021       Patient: Kecia Blue  MRN: [id number]  : 1960 (age 60 year old)  Transplant: 3/1/2018 (Lung), POD#[id number]  Admission date: 2021    Assessment & Plan:     Kecia Blue is a 60 year old female with PMH of BSLT () for ILD with antisynthetase sydrome, postoperative course c/b right mainstem bronchial stenosis s/p several dilations, left-sided Aspergillus empyema () s/p amphotericin beads (2020), EBV viremia, CMV viremia, and ESRD on HD .  Patient was admitted to OSH on  for acute hypoxemic respiratory failure and new lung opacities. Intubated  and transferred to [facility] for ongoing management.  Extubated  but reintubated - for progressive hypoxemia.  Rapid decompensation 2/3 with ARDS presentation requiring reintubation, prone positioning, and inhaled epoprostenol, suspected d/t worsening PJP. S/p Trach , and PEG-J ().  S/p dilation of right main by IP on .  Has remained stable on TD exclusively since bronch.      Today's recommendations:  - Follow bronch cultures from , no indication to resume ABX at this time  - TD exclusively, wean FiO2 and flow as tolerated  - Video swallow eval   - Posaconazole level  (ordered)  - Tacro level   - VGCV for CMV ppx through  (ordered)  - Repeat EBV pending     Acute hypoxemic respiratory failure:   Right post-obstructive Stenotrophomonas and Eikenella pneumonia:  PJP pneumonia:  Septic shock, Resolved:   Airway stenosis with distal plugging s/p right mainstem dilation ():  ARDS: Presented to OSH ED with increased SOB and hypoxia, intubated  with need for pressors, transferred to [facility].  PTA bronch (20) with moderate airway obstruction and RML/RLL mucous plugging, cultures with Stenotrophomonas and Eikenella.  OSH CT chest with new multifocal GGO bilaterally and increased left loculated pleural  effusion.  Repeat bronch (1/24) with RUL BAL with thick copious secretions to RML/RLL, PJP PCR positive.  Extubated on 1/26, but reintubated from 1/27-1/29 for progressive hypoxemia.  Continued to require significant PEEP, eventually decompensated on 2/3 and again reintubated.  Presentation consistent with ARDS (tx: prone positioning, inhaled epoprostenol, pressors).  CT chest (2/3) with progressive consolidation and possible superimposed infection findings, stable LLL chronic empyema.  Bronch on 2/11 with persistent right mainstem stenosis with moderate airway obstruction.  S/p trach 2/12 by IP.  S/p steroid burst with taper through 2/17 and 21 day Bactrim course for PJP through 2/15.  Hypoxia improving, TD exclusively since 2/19.  - Bronch cultures (2/19) with Staph epidermidis, continue to follow  - TD exclusively, wean FiO2 and flow as tolerated  - IP downsized to uncuffed Shiley #6 2/22  - SLP following for PMSV trials.  Video swallow eval 2/23  - Chest physiotherapy QID and nebs: albuterol QID, Mucomyst BID, and Pulmicort BID  - Repeat CXR 2/22 grossly unchanged in regard to mixed opacities  - Repeat IP bronch in 6 weeks (~4/2)     Aspergillus empyema (left-sided): Noted 10/8/19, negative in November and again on admission.  CT scan on 7/17/20 with increased mass-like density, likely pleural-based, in LLL area s/p needle aspiration (8/13/20) with Aspergillus fumigatus on cultures s/p intrapleural amphotericin bead placement (11/20).  Follows with ID as OP.  LFTs stable on admission (ALP chronically mildly elevated).  CT chest with increased loculated left pleural effusion s/p thoracentesis (1/25), exudative with 87% neutrophils, very high LDL (6308), cytology normal, AFB pleural cultures NGTD-stain negative.  - Posaconazole IV (transitioned from PO 2/9 d/t subtherapeutic level).  Repeat level (2/16) remains subtherapeutic, increased to TID dosing (2/18).  Repeat level 2/25 (ordered).  Plan for indefinite  course.     S/p bilateral lung transplant for ILD 2/2 CTD: Last seen in pulmonary clinic 12/2. DSA (1/25) not detected.     Immunosuppression: On 2 drug IST d/t recurrent infections  - Tacrolimus 2 mg qAM/ 2.5 mg qPM (via G tube only).  Goal level 8-10. Level 2/229.8, no dose adjustment.  Repeat level 2/25 (ordered).  - Chronic prednisone 5 mg qAM / 2.5 mg qPM (transitioned from burst steroids 2/18)     Prophylaxis:   - Bactrim qMWF for PJP ppx     H/o CMV viremia: CMV D+/R+.  CMV <137 (1/25).  - Continue VGCV for CMV ppx through 2/25 (one week after steroid burst completion, end date ordered)     EBV viremia: Level 12/9/20 mildly elevated to 10K (log 4) from prior 3K (3.5 log) on 9/9/20, repeat (1/25/21) decreased to 5619 copies (log of 3.8).  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 Chronic iHD M/Th via AVF with partial graft.  Not able to tolerate iHD 2/3 d/t hypotension, line placed and transitioned to CRRT 2/4. Now back to iHD, qMWF schedule.  - Dialysis management per nephrology     Atrial fibrillation w/ RVR:  H/o DVT:  H/o paroxysmal AF, most recent recurrence 1/30 with RVR (-140s), likely in the setting of acute pulmonary illness.  - EP was consulted 2/22, they recommended Zio patch prior to discharge and follow up as an outpatient      Oropharyngeal candidiasis: White tongue plaque noted 2/1.  - Nystatin QID (2/1)    We appreciate the excellent care provided by the [place] [id number] team. Recommendations communicated via in person rounding and this note. Will continue to follow along closely, please do not hesitate to call with any questions or concerns.    Patient discussed with Dr. Christensen.    Nikia Braun MD   Pulmonary and Critical Care Fellow   Pager [phone]       Subjective & Interval History:     Patient is doing well, she is comfortable with speaking valve, she is looking forward to her swallow eval today, she  "is comfortable on 30 % FiO2, no SOB, she had some secretions that were suctioned x2, she is trying to suction her upper respiratory secretions.    Review of Systems:     C: No fever, no chills  INTEGUMENTARY/SKIN: No rash or obvious new lesions  ENT/MOUTH: some sore throat   RESP: See interval history  CV: No chest pain, no palpitations, no peripheral edema, no orthopnea  GI: No nausea, no vomiting, no reflux symptoms  : + oliguria  MUSCULOSKELETAL: No myalgias, no arthralgias  ENDOCRINE: Blood sugars with adequate control  PSYCHIATRIC: Mood stable    Physical Exam:     Vital signs:  Temp: 98.5  F (36.9  C) Temp src: Oral BP: 125/72 Pulse: 93   Resp: 20 SpO2: 95 % O2 Device: Trach dome Oxygen Delivery: 25 LPM Height: 160 cm (5' 2.99\") Weight: 54.8 kg (120 lb 13 oz)  I/O:       Intake/Output Summary (Last 24 hours) at 2/23/2021 1235  Last data filed at 2/23/2021 1200  Gross per 24 hour   Intake 1760 ml   Output --   Net 1760 ml         Constitutional: Sitting up in bed,  at bedside, in no apparent distress.   HEENT:  Oral mucosa moist without lesions. Trach in place.  PULM: Diminished at the bases, rhonchi in the RLL, clear otherwise.  CV: Normal S1 and S2. Irregular. No murmur, gallop, or rub. No peripheral edema.   ABD:  soft, nontender, nondistended.   MSK: Moves all extremities.  NEURO: Alert and oriented, conversant by PSMV   SKIN: Warm, dry. No rash on limited exam.   PSYCH: Mood stable.     Lines, Drains, and Devices:  Peripheral IV 02/16/21 Right;Posterior Lower forearm (Active)   Site Assessment [facility] 02/22/21 1200   Line Status Saline locked 02/22/21 1200   Phlebitis Scale 0-->no symptoms 02/22/21 1200   Infiltration Scale 0 02/22/21 1200   Infiltration Site Treatment Method  None 02/21/21 1600   Number of days: 6       Peripheral IV 02/19/21 Right;Anterior Upper forearm (Active)   Site Assessment [facility] 02/22/21 1200   Line Status Saline locked 02/22/21 1200   Phlebitis Scale 0-->no symptoms 02/22/21 " 1200   Infiltration Scale 0 02/22/21 1200   Infiltration Site Treatment Method  None 02/21/21 1600   Number of days: 3       CVC Double Lumen 10/25/19 Right Internal jugular (Active)   Number of days: 486       Hemodialysis Vascular Access Arteriovenous fistula Left Arm (Active)   Site Assessment WDL;Bruit present;Thrill present 02/22/21 1200   Cannulation Needle Size 16 02/21/21 1045   Dressing Intervention New dressing 02/21/21 1340   Dressing Status Clean, dry, intact 02/22/21 1200   Verification by x-ray Not applicable 02/21/21 1600   Hand Off Report Yes 02/21/21 1340   Number of days: 28     Data:     LABS    CMP:   Recent Labs   Lab 02/23/21  0513 02/22/21  0527 02/20/21  0503 02/19/21  0458   * 131* 132* 134   POTASSIUM 5.3 4.7 4.2 4.0   CHLORIDE 98 99 98 100   CO2 28 27 28 29   ANIONGAP 6 5 6 5   * 136* 154* 102*   BUN 58* 38* 32* 46*   CR 3.43* 2.53* 2.37* 3.14*   GFRESTIMATED 14* 20* 22* 16*   GFRESTBLACK 16* 23* 25* 18*   CHELSEY 9.2 8.8 8.6 8.8   MAG  --  1.6  --   --    PROTTOTAL  --   --  6.4*  --    ALBUMIN  --   --  2.4*  --    BILITOTAL  --   --  0.4  --    ALKPHOS  --   --  244*  --    AST  --   --  15  --    ALT  --   --  34  --      CBC:   Recent Labs   Lab 02/23/21  0513 02/22/21 0527 02/20/21  0503 02/19/21  0458   WBC 3.3* 3.0* 2.5* 3.0*   RBC 2.94* 3.14* 3.13* 2.96*   HGB 9.2* 9.7* 10.0* 9.0*   HCT 29.8* 31.1* 31.5* 29.3*   * 99 101* 99   MCH 31.3 30.9 31.9 30.4   MCHC 30.9* 31.2* 31.7 30.7*   RDW 20.8* 21.2* 21.5* 21.4*    241 217 207       INR:   Recent Labs   Lab 02/19/21  0458   INR 0.99       Glucose:   Recent Labs   Lab 02/23/21  0733 02/23/21  0513 02/23/21  0314 02/22/21  2328 02/22/21  1905 02/22/21  1524 02/22/21  1120 02/22/21  0527 02/22/21  0527 02/20/21  0503 02/20/21  0503 02/19/21  0458 02/19/21  0458 02/18/21  0430 02/18/21  0430   GLC  --  140*  --   --   --   --   --   --  136*  --  154*  --  102*  --  158*   *  --  153* 146* 122* 126* 88   <  >  --    < >  --    < >  --    < >  --     < > = values in this interval not displayed.       Blood Gas: No lab results found in last 7 days.    Culture Data   Recent Labs   Lab 02/19/21  0853   CULT No growth after 3 days  Culture negative after 3 days  Moderate growth  Staphylococcus epidermidis  *  Culture received and in progress.  Positive AFB results are called as soon as detected.    Final report to follow in 7 to 8 weeks.    Assayed at [facility] [address] [place] [id number] [phone]       Virology Data:   Lab Results   Component Value Date    FLUAH1 Not Detected 01/24/2021    FLUAH3 Not Detected 01/24/2021    QN8336 Not Detected 01/24/2021    IFLUB Not Detected 01/24/2021    RSVA Not Detected 01/24/2021    RSVB Not Detected 01/24/2021    PIV1 Not Detected 01/24/2021    PIV2 Not Detected 01/24/2021    PIV3 Not Detected 01/24/2021    HMPV Not Detected 01/24/2021    HRVS Negative 01/24/2021    ADVBE Negative 01/24/2021    ADVC Negative 01/24/2021    ADVC Negative 12/23/2020    ADVC Negative 10/07/2019       Historical CMV results (last 3 of prior testing):  Lab Results   Component Value Date    CMVQNT <137 (A) 01/25/2021    CMVQNT 238 (A) 01/24/2021    CMVQNT 675 (A) 12/23/2020     Lab Results   Component Value Date    CMVLOG <2.1 01/25/2021    CMVLOG 2.4 (H) 01/24/2021    CMVLOG 2.8 (H) 12/23/2020       Urine Studies    Recent Labs   Lab Test 01/24/21  1729 10/21/19  2240   URINEPH 5.0 5.0   NITRITE Negative Negative   LEUKEST Moderate* Large*   WBCU 34* 115*       Most Recent [place] Pulmonary Function Testing (FVC/FEV1 only)  FVC-Pre   Date Value Ref Range Status   12/09/2020 1.12 L    09/09/2020 1.56 L    03/09/2020 1.57 L    02/19/2020 1.78 L      FVC-%Pred-Pre   Date Value Ref Range Status   12/09/2020 34 %    09/09/2020 47 %    03/09/2020 48 %    02/19/2020 54 %      FEV1-Pre   Date Value Ref Range Status   12/09/2020 0.98 L    09/09/2020 1.10 L    03/09/2020 0.96 L    02/19/2020  0.99 L      FEV1-%Pred-Pre   Date Value Ref Range Status   12/09/2020 37 %    09/09/2020 42 %    03/09/2020 37 %    02/19/2020 38 %        IMAGING    No results found for this or any previous visit (from the past 48 hour(s)).

## 2021-02-24 ENCOUNTER — APPOINTMENT (OUTPATIENT)
Dept: OCCUPATIONAL THERAPY | Facility: CLINIC | Age: 61
End: 2021-02-24
Attending: INTERNAL MEDICINE
Payer: COMMERCIAL

## 2021-02-24 ENCOUNTER — APPOINTMENT (OUTPATIENT)
Dept: GENERAL RADIOLOGY | Facility: CLINIC | Age: 61
End: 2021-02-24
Attending: STUDENT IN AN ORGANIZED HEALTH CARE EDUCATION/TRAINING PROGRAM
Payer: COMMERCIAL

## 2021-02-24 ENCOUNTER — APPOINTMENT (OUTPATIENT)
Dept: PHYSICAL THERAPY | Facility: CLINIC | Age: 61
End: 2021-02-24
Attending: INTERNAL MEDICINE
Payer: COMMERCIAL

## 2021-02-24 ENCOUNTER — APPOINTMENT (OUTPATIENT)
Dept: SPEECH THERAPY | Facility: CLINIC | Age: 61
End: 2021-02-24
Attending: INTERNAL MEDICINE
Payer: COMMERCIAL

## 2021-02-24 LAB
ANION GAP SERPL CALCULATED.3IONS-SCNC: 6 MMOL/L (ref 3–14)
BUN SERPL-MCNC: 35 MG/DL (ref 7–30)
CALCIUM SERPL-MCNC: 9.1 MG/DL (ref 8.5–10.1)
CHLORIDE SERPL-SCNC: 98 MMOL/L (ref 94–109)
CO2 SERPL-SCNC: 28 MMOL/L (ref 20–32)
CREAT SERPL-MCNC: 2.18 MG/DL (ref 0.52–1.04)
GFR SERPL CREATININE-BSD FRML MDRD: 24 ML/MIN/{1.73_M2}
GLUCOSE BLDC GLUCOMTR-MCNC: 101 MG/DL (ref 70–99)
GLUCOSE BLDC GLUCOMTR-MCNC: 112 MG/DL (ref 70–99)
GLUCOSE BLDC GLUCOMTR-MCNC: 122 MG/DL (ref 70–99)
GLUCOSE BLDC GLUCOMTR-MCNC: 130 MG/DL (ref 70–99)
GLUCOSE BLDC GLUCOMTR-MCNC: 135 MG/DL (ref 70–99)
GLUCOSE BLDC GLUCOMTR-MCNC: 151 MG/DL (ref 70–99)
GLUCOSE BLDC GLUCOMTR-MCNC: 164 MG/DL (ref 70–99)
GLUCOSE BLDC GLUCOMTR-MCNC: 173 MG/DL (ref 70–99)
GLUCOSE BLDC GLUCOMTR-MCNC: 59 MG/DL (ref 70–99)
GLUCOSE SERPL-MCNC: 140 MG/DL (ref 70–99)
INTERPRETATION ECG - MUSE: NORMAL
LACTATE BLD-SCNC: 1.2 MMOL/L (ref 0.7–2)
MAGNESIUM SERPL-MCNC: 1.7 MG/DL (ref 1.6–2.3)
POTASSIUM SERPL-SCNC: 4.3 MMOL/L (ref 3.4–5.3)
SODIUM SERPL-SCNC: 132 MMOL/L (ref 133–144)

## 2021-02-24 PROCEDURE — 250N000013 HC RX MED GY IP 250 OP 250 PS 637: Performed by: NURSE PRACTITIONER

## 2021-02-24 PROCEDURE — 94640 AIRWAY INHALATION TREATMENT: CPT

## 2021-02-24 PROCEDURE — 74230 X-RAY XM SWLNG FUNCJ C+: CPT | Mod: 26 | Performed by: RADIOLOGY

## 2021-02-24 PROCEDURE — 94668 MNPJ CHEST WALL SBSQ: CPT

## 2021-02-24 PROCEDURE — 999N001017 HC STATISTIC GLUCOSE BY METER IP

## 2021-02-24 PROCEDURE — 250N000009 HC RX 250: Performed by: NURSE PRACTITIONER

## 2021-02-24 PROCEDURE — 250N000013 HC RX MED GY IP 250 OP 250 PS 637: Performed by: STUDENT IN AN ORGANIZED HEALTH CARE EDUCATION/TRAINING PROGRAM

## 2021-02-24 PROCEDURE — 97530 THERAPEUTIC ACTIVITIES: CPT | Mod: GP

## 2021-02-24 PROCEDURE — 83735 ASSAY OF MAGNESIUM: CPT | Performed by: NURSE PRACTITIONER

## 2021-02-24 PROCEDURE — 92526 ORAL FUNCTION THERAPY: CPT | Mod: GN

## 2021-02-24 PROCEDURE — 250N000012 HC RX MED GY IP 250 OP 636 PS 637: Performed by: INTERNAL MEDICINE

## 2021-02-24 PROCEDURE — 97110 THERAPEUTIC EXERCISES: CPT | Mod: GO

## 2021-02-24 PROCEDURE — 83605 ASSAY OF LACTIC ACID: CPT | Performed by: STUDENT IN AN ORGANIZED HEALTH CARE EDUCATION/TRAINING PROGRAM

## 2021-02-24 PROCEDURE — 258N000001 HC RX 258: Performed by: STUDENT IN AN ORGANIZED HEALTH CARE EDUCATION/TRAINING PROGRAM

## 2021-02-24 PROCEDURE — 99207 PR CDG-MDM COMPONENT: MEETS HIGH - UP CODED: CPT | Performed by: STUDENT IN AN ORGANIZED HEALTH CARE EDUCATION/TRAINING PROGRAM

## 2021-02-24 PROCEDURE — 99233 SBSQ HOSP IP/OBS HIGH 50: CPT | Performed by: NURSE PRACTITIONER

## 2021-02-24 PROCEDURE — 99233 SBSQ HOSP IP/OBS HIGH 50: CPT | Performed by: STUDENT IN AN ORGANIZED HEALTH CARE EDUCATION/TRAINING PROGRAM

## 2021-02-24 PROCEDURE — 250N000012 HC RX MED GY IP 250 OP 636 PS 637: Performed by: NURSE PRACTITIONER

## 2021-02-24 PROCEDURE — 97535 SELF CARE MNGMENT TRAINING: CPT | Mod: GO

## 2021-02-24 PROCEDURE — 36415 COLL VENOUS BLD VENIPUNCTURE: CPT | Performed by: NURSE PRACTITIONER

## 2021-02-24 PROCEDURE — 92611 MOTION FLUOROSCOPY/SWALLOW: CPT | Mod: GN

## 2021-02-24 PROCEDURE — 999N000157 HC STATISTIC RCP TIME EA 10 MIN

## 2021-02-24 PROCEDURE — 250N000009 HC RX 250: Performed by: STUDENT IN AN ORGANIZED HEALTH CARE EDUCATION/TRAINING PROGRAM

## 2021-02-24 PROCEDURE — 97110 THERAPEUTIC EXERCISES: CPT | Mod: GP

## 2021-02-24 PROCEDURE — 250N000013 HC RX MED GY IP 250 OP 250 PS 637: Performed by: INTERNAL MEDICINE

## 2021-02-24 PROCEDURE — 97116 GAIT TRAINING THERAPY: CPT | Mod: GP

## 2021-02-24 PROCEDURE — 74230 X-RAY XM SWLNG FUNCJ C+: CPT

## 2021-02-24 PROCEDURE — 93005 ELECTROCARDIOGRAM TRACING: CPT

## 2021-02-24 PROCEDURE — 999N000043 HC STATISTIC CTO2 CONT OXYGEN TECH TIME EA 90 MIN

## 2021-02-24 PROCEDURE — 94640 AIRWAY INHALATION TREATMENT: CPT | Mod: 76

## 2021-02-24 PROCEDURE — 120N000003 HC R&B IMCU UMMC

## 2021-02-24 PROCEDURE — 93010 ELECTROCARDIOGRAM REPORT: CPT | Performed by: INTERNAL MEDICINE

## 2021-02-24 PROCEDURE — 80048 BASIC METABOLIC PNL TOTAL CA: CPT | Performed by: NURSE PRACTITIONER

## 2021-02-24 PROCEDURE — 36415 COLL VENOUS BLD VENIPUNCTURE: CPT | Performed by: STUDENT IN AN ORGANIZED HEALTH CARE EDUCATION/TRAINING PROGRAM

## 2021-02-24 RX ORDER — AMINO AC/PROTEIN HYDR/WHEY PRO 10G-100/30
1 LIQUID (ML) ORAL DAILY
Status: DISCONTINUED | OUTPATIENT
Start: 2021-02-25 | End: 2021-02-25 | Stop reason: HOSPADM

## 2021-02-24 RX ORDER — SULFAMETHOXAZOLE AND TRIMETHOPRIM 200; 40 MG/5ML; MG/5ML
10 SUSPENSION ORAL
Status: DISCONTINUED | OUTPATIENT
Start: 2021-02-25 | End: 2021-02-25 | Stop reason: HOSPADM

## 2021-02-24 RX ADMIN — OLANZAPINE 10 MG: 2.5 TABLET ORAL at 21:08

## 2021-02-24 RX ADMIN — APIXABAN 2.5 MG: 2.5 TABLET, FILM COATED ORAL at 21:07

## 2021-02-24 RX ADMIN — Medication 5 ML: at 07:56

## 2021-02-24 RX ADMIN — ALBUTEROL SULFATE 2.5 MG: 2.5 SOLUTION RESPIRATORY (INHALATION) at 16:22

## 2021-02-24 RX ADMIN — PREDNISONE 2.5 MG: 2.5 TABLET ORAL at 21:07

## 2021-02-24 RX ADMIN — INSULIN GLARGINE 10 UNITS: 100 INJECTION, SOLUTION SUBCUTANEOUS at 08:00

## 2021-02-24 RX ADMIN — SULFAMETHOXAZOLE AND TRIMETHOPRIM 80 MG: 200; 40 SUSPENSION ORAL at 07:56

## 2021-02-24 RX ADMIN — POSACONAZOLE 300 MG: 40 SUSPENSION ORAL at 07:56

## 2021-02-24 RX ADMIN — ALBUTEROL SULFATE 2.5 MG: 2.5 SOLUTION RESPIRATORY (INHALATION) at 12:08

## 2021-02-24 RX ADMIN — ALBUTEROL SULFATE 2.5 MG: 2.5 SOLUTION RESPIRATORY (INHALATION) at 19:41

## 2021-02-24 RX ADMIN — INSULIN ASPART 1 UNITS: 100 INJECTION, SOLUTION INTRAVENOUS; SUBCUTANEOUS at 15:57

## 2021-02-24 RX ADMIN — PREDNISONE 5 MG: 5 TABLET ORAL at 08:01

## 2021-02-24 RX ADMIN — DEXTROSE MONOHYDRATE 25 ML: 500 INJECTION PARENTERAL at 11:11

## 2021-02-24 RX ADMIN — Medication 40 MG: at 08:00

## 2021-02-24 RX ADMIN — METOPROLOL TARTRATE 50 MG: 25 TABLET, FILM COATED ORAL at 08:00

## 2021-02-24 RX ADMIN — TACROLIMUS 2.5 MG: 5 CAPSULE ORAL at 17:52

## 2021-02-24 RX ADMIN — INSULIN ASPART 2 UNITS: 100 INJECTION, SOLUTION INTRAVENOUS; SUBCUTANEOUS at 21:10

## 2021-02-24 RX ADMIN — BUDESONIDE 0.5 MG: 0.5 INHALANT ORAL at 19:41

## 2021-02-24 RX ADMIN — APIXABAN 2.5 MG: 2.5 TABLET, FILM COATED ORAL at 08:00

## 2021-02-24 RX ADMIN — Medication 1 PACKET: at 08:02

## 2021-02-24 RX ADMIN — METOPROLOL TARTRATE 50 MG: 25 TABLET, FILM COATED ORAL at 21:07

## 2021-02-24 RX ADMIN — ACETYLCYSTEINE 2 ML: 200 SOLUTION ORAL; RESPIRATORY (INHALATION) at 19:41

## 2021-02-24 RX ADMIN — BUDESONIDE 0.5 MG: 0.5 INHALANT ORAL at 07:30

## 2021-02-24 RX ADMIN — POSACONAZOLE 300 MG: 40 SUSPENSION ORAL at 14:27

## 2021-02-24 RX ADMIN — TACROLIMUS 2 MG: 5 CAPSULE ORAL at 07:56

## 2021-02-24 RX ADMIN — ACETYLCYSTEINE 2 ML: 200 SOLUTION ORAL; RESPIRATORY (INHALATION) at 07:30

## 2021-02-24 RX ADMIN — Medication 1 PACKET: at 21:13

## 2021-02-24 RX ADMIN — ALBUTEROL SULFATE 2.5 MG: 2.5 SOLUTION RESPIRATORY (INHALATION) at 07:30

## 2021-02-24 RX ADMIN — INSULIN ASPART 1 UNITS: 100 INJECTION, SOLUTION INTRAVENOUS; SUBCUTANEOUS at 23:35

## 2021-02-24 RX ADMIN — POSACONAZOLE 300 MG: 40 SUSPENSION ORAL at 21:13

## 2021-02-24 ASSESSMENT — ACTIVITIES OF DAILY LIVING (ADL)
ADLS_ACUITY_SCORE: 15

## 2021-02-24 ASSESSMENT — MIFFLIN-ST. JEOR
SCORE: 1053
SCORE: 1070.64

## 2021-02-24 NOTE — PROGRESS NOTES
CLINICAL NUTRITION SERVICES - REASSESSMENT NOTE     Nutrition Prescription    RECOMMENDATIONS FOR MDs/PROVIDERS TO ORDER:  Diet advancement per SLP/primary team     Malnutrition Status:    Severe malnutrition in the context of chronic illness     Recommendations already ordered by Registered Dietitian (RD):  Cycled Enteral Nutrition: Nutren 1.5 @ 70 ml/hr x 18 hours (4pm-10 am) + 1 packet prosource = 1260 mL, 1930 kcal (~37 kcal/kg), 97 g protein (1.8 g/kg), 221 g CHO, 0 g fiber 958 mL free water    Ordered Ensure Enlive when diet advances    Future/Additional Recommendations:  Monitor tolerance to cycled enteral nutrition regimen/increased rate  Monitor ability to take PO intake/ability to schedule additional snacks/supplements      EVALUATION OF THE PROGRESS TOWARD GOALS   Diet: NPO  Nutrition Support: Nutren 1.5 @ 50 mL/hr via jejunosotmy = 1800 kcals (33 kcal/kg/day), 82 g PRO (1.5 g/kg/day), 912 mL H2O, 211 g CHO and no fiber daily   + 2 pkt Prosource daily to provide 1880 kcal (34 kcal/kg) and 104 g PRO (1.9 g/kg)    Intake: average enteral nutrition + prosource received 2/17-2/23 provided 1096 mL/day (TF), 1724 kcal (31 kcal/kg) and 97g protein (1.7 g/kg) meeting 100% of low end estimated energy and protein needs       NEW FINDINGS   Patient reported she is feeling hungry and is tolerating TF well. She is very open to increasing rate to getting extra energy/protien and cycling TF. Reported she is hopeful for diet advancement and likes Ensure enlive if diet does advance.     Weight Trends:  02/24/21 0800 52.2 kg (115 lb) - standing scale   02/24/21 0400 50.4 kg (111 lb 1.8 oz)   02/21/21 0600 54.8 kg (120 lb 13 oz)   02/20/21 0600 54.2 kg (119 lb 7.8 oz)   02/19/21 0400 56.1 kg (123 lb 9.6 oz)   02/17/21 1348 54.7 kg (120 lb 9.5 oz)   02/17/21 0600 57.7 kg (127 lb 3.3 oz)   02/16/21 0600 57.1 kg (125 lb 14.1 oz)   02/15/21 0000 59.4 kg (130 lb 14.4 oz   Admission weight 58.6 kg (1/24/21)   +9L from 2/10 per  I/O    Dosing weight 52.2 kg (standing scale weight)   Estimated Energy Needs: 1827+ kcals/day (35 kcals/kg)   Justification: Maintenance   Estimated Protein Needs: + g/day (1.5-2+ g/kg)   Justification: Dialysis     GI: Patient denied any GI concerns. Last bowel movement 2/24.   Renal: Nephrology following, HD yesterday, 3000 ml removed. Post weighth 51.8 kg   Speech: Plan for video swallow evaluation today   Skin: Blanchable redness to coccyx     Labs: glucose  (past 4), Na 132 (L), K+ 4.3 (WNL), Mg 1.7 (WNL)   Medications: Nephronex, Nutrisource fiber 2 packet daily, Lantus, Protonix, Prednisone, tacrolimus     MALNUTRITION  % Intake: No decreased intake noted  % Weight Loss: > 5% in 1 month (severe), some loss likely related to fluid  Subcutaneous Fat Loss: Facial region:  moderate  Muscle Loss: Temporal:  moderate, Facial & jaw region: moderate, Scapular bone: moderate Thoracic region (clavicle, acromium bone, deltoid, trapezius, pectoral): moderate, Upper arm (bicep, tricep): moderate, Lower arm (forearm): mild and Posterior calf: moderate  Fluid Accumulation/Edema: None noted  Malnutrition Diagnosis: Severe malnutrition in the context of chronic illness     Previous Goals   Total avg nutritional intake to meet a minimum of 25 kcal/kg and 1.2 g PRO/kg daily (per dosing wt 59 kg).  Evaluation: Met    Previous Nutrition Diagnosis  Predicted inadequate nutrient intake (calories, protein) related to prolonged hospital LOS and risk for interruptions to TF infusion.    Evaluation: No change    CURRENT NUTRITION DIAGNOSIS  Predicted inadequate nutrient intake (calories, protein) related to prolonged hospital LOS and risk for interruptions to TF infusion which is currently meeting 100% of estimated needs.      INTERVENTIONS  Implementation  Collaboration with other providers  Enteral Nutrition -modified     Goals  Total avg nutritional intake to meet a minimum of 35 kcal/kg and 1.5 g PRO/kg daily (per  dosing wt 52.2 kg).    Monitoring/Evaluation  Progress toward goals will be monitored and evaluated per protocol.    Kaitlynn Anderson RD, STEPHANY  6B pager: [phone]

## 2021-02-24 NOTE — PROGRESS NOTES
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  Patient was admitted to OSH on 1/22 for acute hypoxemic respiratory failure and new lung opacities. Intubated 1/24 and transferred to [facility] for ongoing management.  Extubated 1/26 but reintubated 1/27-1/29 for progressive hypoxemia.  Rapid decompensation 2/3 with ARDS presentation requiring reintubation, prone positioning, and inhaled epoprostenol, suspected d/t worsening PJP. S/p Trach 2/12, and PEG-J (2/16).  S/p dilation of right main by IP on 2/19.  Has remained stable on TD exclusively since bronch.    Type of Evaluation   Type of Evaluation Swallow Evaluation   Tracheostomy Assessment (Speaking Valve)   Comment, Tracheostomy (Speaking Valve) Pt came down to evaluation with speaking valve in place   Dentition (Oral Motor)   Dentition (Oral Motor) adequate dentition   General Swallowing Observations   Swallowing Evaluation Videofluoroscopic swallow study (VFSS)   VFSS Evaluation   Radiologist Radiology Resident   Views Taken left lateral   Physical Location of Procedure Radiology suite 5   VFSS Textures Trialed Nectar-Thick Liquids;Thin Liquids;Purees;Solid   VFSS Eval: Thin Liquid Texture Trial   Mode of Presentation, Thin Liquid cup;straw;self-fed   Order of Presentation 3, 4, 7, 8, 9   Preparatory Phase WFL   Oral Phase, Thin Liquid WFL   Pharyngeal Phase, Thin Liquid Delayed swallow reflex;Residue in pyriform sinus  (minimal amount of residue)   Rosenbek's Penetration Aspiration Scale: Thin  Liquid Trial Results 2 - contrast enters airway, remains above the vocal cords, no residue remains (penetration)   Diagnostic Statement No aspiration observed. Pt did have x2 small amount of flash penetration with large consecutive sips however noted to completely reverse with no observable residuals.   VFSS Evaluation: Nectar Thick Liquid Texture Trial   Mode of Presentation, Nectar cup;self-fed   Order of Presentation 1, 2   Preparatory Phase WFL   Oral Phase, Nectar WFL   Pharyngeal Phase, Nectar Delayed swallow reflex;Residue in pyriform sinus  (small amount of residual )   Rosenbek's Penetration Aspiration Scale: Nectar-Thick Liquid Trial Results 1 - no aspiration, contrast does not enter airway   Diagnostic Statement No penetration or aspiration observed with nectar thick trials   VFSS Evaluation: Puree Solid Texture Trial   Mode of Presentation, Puree spoon   Order of Presentation 5   Preparatory Phase WFL   Oral Phase, Puree WFL   Pharyngeal Phase, Puree Residue in valleculae;Residue in pyriform sinus  (small amount that partially cleared blaze with second swallow)   Rosenbek's Penetration Aspiration Scale: Puree Food Trial Results 1 - no aspiration, contrast does not enter airway   Diagnostic Statement No penetration or aspiration observed   VFSS Evaluation: Solid Food Texture Trial   Mode of Presentation, Solid self-fed   Order of Presentation 7   Preparatory Phase WFL   Oral Phase, Solid WFL   Pharyngeal Phase, Solid WFL   Rosenbek's Penetration Aspiration Scale: Solid Food Trial Results 1 - no aspiration, contrast does not enter airway   Diagnostic Statement No penetration or aspiration observed   Esophageal Phase of Swallow   Patient reports or presents with symptoms of esophageal dysphagia No   Swallowing Recommendations   Diet Consistency Recommendations regular diet;thin liquids   Supervision Level for Intake distant supervision needed   Mode of Delivery Recommendations bolus size, small   Swallowing  Maneuver Recommendations alternate food and liquid intake   Monitoring/Assistance Required (Eating/Swallowing) stop eating activities when fatigue is present;monitor for cough or change in vocal quality with intake;optimize oral intake to minimize need for tube feeding   Recommended Feeding/Eating Techniques (Swallow Eval) maintain upright sitting position for eating;provide oral hygiene prior to intake   Medication Administration Recommendations, Swallowing (SLP) Whole with puree   General Therapy Interventions   Dysphagia treatment Instruction of safe swallow strategies   SLP Therapy Assessment/Plan   Criteria for Skilled Therapeutic Interventions Met (SLP Eval) yes;treatment indicated   SLP Diagnosis Functional oropharyngeal swallowing mechanism   Rehab Potential (SLP Eval) good, to achieve stated therapy goals   Therapy Frequency (SLP Eval) 5 times/wk   Predicted Duration of Therapy Intervention (SLP Eval) 1 week   Comment, Therapy Assessment/Plan (SLP) SLP: Pt completed Video Fluoroscopic Swallow Study (VFSS) per MD orders. Pt presents with functional oropharyngeal swallowing mechanism and a regular/thin liquid diet is recommended. Pt should be given medications whole in puree to help avoid large sips of thin liquid. During evaluation pt trialed thin and nectar thick liquids, puree and regular solids. No aspiration was observed throughout the evaluation. Pt did have x2 small amount of flash penetration with large consecutive sips however noted to completely reverse with no observable residuals. Pt noted to have adequate anterior to posterior oral transit, would occasionally have small amount of pharyngeal residue which she would partially clear independently with a second swallow. Pt had complete epiglottic inversion however swallow trigger was delayed from epiglottic-pyriform sinus. Speech therapy will continue to follow to train safe swallow strategies and ensure continued tolerance of recommended diet.     Therapy Plan Review/Discharge Plan (SLP)   Therapy Plan Review (SLP) evaluation/treatment results reviewed;care plan/treatment goals reviewed;risks/benefits reviewed;current/potential barriers reviewed;participants voiced agreement with care plan;participants included;patient   SLP Discharge Planning    SLP Discharge Recommendation (DC Rec) [facility] patient will benefit from intensive, interdisciplinary therapy.  Anticipate will be able to tolerate 3 hours of therapy per day   SLP Rationale for DC Rec Benefits from SLP tx to improve communciation effectiveness with PMSV/tracheostomy.    SLP Brief overview of current status  Recommend pt initiate diet regular solids/thin liquids. Medications to be whole in puree. Pt should have PMSV on for all PO intake. Rec pt continue to wear PMSV as tolerated when awake. Remove if fatigued, SOB or sleeping. Pt is able to don/doff PMSV and understands guidelines for use with good accuracy.    Total Evaluation Time   Total Evaluation Time (Minutes) 15

## 2021-02-24 NOTE — PROGRESS NOTES
Pulmonary Medicine  Cystic Fibrosis - Lung Transplant Team  Progress Note  2021       Patient: Kecia Blue  MRN: [id number]  : 1960 (age 60 year old)  Transplant: 3/1/2018 (Lung), POD#[id number]  Admission date: 2021    Assessment & Plan:     Kecia Blue is a 60 year old female with PMH of BSLT () for ILD with antisynthetase sydrome, postoperative course c/b right mainstem bronchial stenosis s/p several dilations, left-sided Aspergillus empyema () s/p amphotericin beads (2020), EBV viremia, CMV viremia, and ESRD on HD .  Patient was admitted to OSH on  for acute hypoxemic respiratory failure and new lung opacities. Intubated  and transferred to [facility] for ongoing management.  Extubated  but reintubated - for progressive hypoxemia.  Rapid decompensation /3 with ARDS presentation requiring reintubation, prone positioning, and inhaled epoprostenol, suspected d/t worsening PJP. S/p Trach , and PEG-J ().  S/p dilation of right main by IP on .  Has remained stable on TD exclusively since bronch.      Today's recommendations:  - Will transition off continuous TD today in favor of humidified NC, resume TD overnight for additional humidity; may cover trach with PMSV or HME during the day (not to be left uncovered)  - VFSS today  - Chest physiotherapy decreased from QID to BID to aid in clearance of secretions (ordered)  - CXR weekly at minimum, next CXR on 3/1 (ordered)  - Tacro level tomorrow  - Posaconazole level tomorrow  - VGCV for CMV ppx through tomorrow (ordered)  - Recheck EBV in 4 weeks (3/22)  - Agree with plan for discharge to ARU, pending bed availability     Acute hypoxemic respiratory failure:   Right post-obstructive Stenotrophomonas and Eikenella pneumonia:  PJP pneumonia:  Septic shock, Resolved:   Airway stenosis with distal plugging s/p right mainstem dilation ():  ARDS: Presented to OSH ED with increased SOB and  hypoxia, intubated 1/24 with need for pressors, transferred to [facility].  PTA bronch (12/23/20) with moderate airway obstruction and RML/RLL mucous plugging, cultures with Stenotrophomonas and Eikenella.  OSH CT chest with new multifocal GGO bilaterally and increased left loculated pleural effusion.  Repeat bronch (1/24) with RUL BAL with thick copious secretions to RML/RLL, PJP PCR positive.  Extubated on 1/26, but reintubated from 1/27-1/29 for progressive hypoxemia.  Continued to require significant PEEP, eventually decompensated on 2/3 and again reintubated.  Presentation consistent with ARDS (tx: prone positioning, inhaled epoprostenol, pressors).  CT chest (2/3) with progressive consolidation and possible superimposed infection findings, stable LLL chronic empyema.  Bronch on 2/11 with persistent right mainstem stenosis with moderate airway obstruction.  S/p trach 2/12 by IP, downsized to Shiley #6 (uncuffed) on 2/22.  S/p steroid burst with taper through 2/17 and 21 day Bactrim course for PJP through 2/15.  Last CXR on 2/22 grossly unchanged in regard to mixed opacities. Hypoxia improving, TD exclusively since 2/19.  - Bronch cultures (2/19) with Staph epidermidis  - Will transition off continuous TD today in favor of humidified NC, resume TD overnight for additional humidity; may cover trach with PMSV or HME during the day (not to be left uncovered)  - SLP following, VFSS today  - Chest physiotherapy decreased from QID to BID to aid in clearance of secretions (ordered)  - Nebs: albuterol QID, Mucomyst BID, and Pulmicort BID  - CXR weekly at minimum, next CXR on 3/1 (ordered)  - Repeat IP bronch in 6 weeks (~4/2)       S/p bilateral lung transplant for ILD 2/2 CTD: Last seen in pulmonary clinic 12/2. DSA (1/25) not detected.     Immunosuppression: On 2-drug IST d/t recurrent infections  - Tacrolimus 2 mg qAM/ 2.5 mg qPM (via G tube only).  Goal level 8-10. Level on 2/23/21 at 9.8, no dose adjustment.  Repeat  level 2/25 (ordered).  - Chronic prednisone 5 mg qAM / 2.5 mg qPM (transitioned from burst steroids 2/18)     Prophylaxis:   - Bactrim qMWF for PJP ppx    Aspergillus empyema (left-sided): Noted 10/8/19, negative in November and again on admission.  CT scan on 7/17/20 with increased mass-like density, likely pleural-based, in LLL area s/p needle aspiration (8/13/20) with Aspergillus fumigatus on cultures s/p intrapleural amphotericin bead placement (11/20).  Follows with ID as OP.  LFTs stable on admission (ALP chronically mildly elevated).  CT chest with increased loculated left pleural effusion s/p thoracentesis (1/25), exudative with 87% neutrophils, very high LDL (6308), cytology normal, AFB pleural cultures NGTD-stain negative.  - Posaconazole 300 mg TID.  Repeat level 2/25 (ordered).  Plan for indefinite course per ID.     H/o CMV viremia: CMV D+/R+.  CMV <137 (1/25).  - Continue VGCV for CMV ppx through 2/25 (one week after steroid burst completion, end date ordered)     EBV viremia: Level 12/9/20 mildly elevated to 10K (log 4) from prior 3K (3.5 log) on 9/9/20, repeat (1/25/21) decreased to 5619 copies (log of 3.8), but increased on 2/22/21 to 75,709 ( log of 4.9). This increase is likely secondary to acute illness and recent steroid burst.   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 Chronic iHD M/Th via AVF with partial graft.  Not able to tolerate iHD 2/3 d/t hypotension, line placed and transitioned to CRRT 2/4. Now back to iHD, qTTS schedule.  Dialysis management per nephrology.     Atrial fibrillation w/ RVR:  H/o DVT:  H/o paroxysmal AF, most recent recurrence 1/30 with RVR (-140s), likely in the setting of acute pulmonary illness.  EP consulted (2/22), recommended the Zio patch prior to discharge and follow-up outpatient.      Oropharyngeal candidiasis, Resolved: S/p nystatin 2/1-2/16.     We appreciate the  "excellent care provided by the [place] [id number] team. Recommendations communicated via in person rounding and this note. Will continue to follow along closely, please do not hesitate to call with any questions or concerns.    Pt. discussed with Dr. Christensen and as a joint visit with APRN student Larisa García.    Hina Huff, DNP, APRN, CNP  Inpatient Nurse Practitioner  Pulmonary CF/Transplant     Subjective & Interval History:     No acute events overnight. Team and nursing notes reviewed. Dialysis with 3L off yesterday. No c/o pain. Denies dyspnea or wheezing. Suctioned 2x per RT, but able to cough up moist secretions through mouth when speaking valve on and wanting to increase independence with suctioning. Patient reports speaking valve on \"almost half the day\" yesterday with no dyspnea.      Review of Systems:     C: No fever, no chills, + decreasing weight  INTEGUMENTARY/SKIN: No rash or obvious new lesions  ENT/MOUTH: No sore throat, no sinus pain, no mouth sores or pain, no nasal congestion or drainage  RESP: See interval history  CV: No chest pain, no palpitations, no peripheral edema, no orthopnea  GI: No nausea, no vomiting, + stable loose stools, no reflux symptoms  : + anuric  MUSCULOSKELETAL: No myalgias, no arthralgias  ENDOCRINE: Blood sugars with adequate control  NEURO: No headache, no numbness or tingling  PSYCHIATRIC: Mood stable    Physical Exam:     Vital signs:  Temp: 97.3  F (36.3  C) Temp src: Oral BP: 122/80 Pulse: 98   Resp: 20 SpO2: 93 % O2 Device: Trach dome Oxygen Delivery: 35 LPM Height: 160 cm (5' 2.99\") Weight: 52.2 kg (115 lb)  I/O:     Intake/Output Summary (Last 24 hours) at 2/24/2021 1323  Last data filed at 2/24/2021 1200  Gross per 24 hour   Intake 1400 ml   Output 3000 ml   Net -1600 ml     Constitutional: Sitting up in bed,  at bedside, in no apparent distress.   HEENT: Eyes with pink conjunctivae, anicteric. Oral mucosa moist without lesions. Trach in place, dsg " CDI.  PULM: Mildly diminished air flow bilaterally. Prominent audible congestion throughout R lung.  Non-labored breathing on TD 21% with PMSV in place.  CV: Normal S1 and S2. Irregular. No murmur, gallop, or rub. No peripheral edema.   ABD: NABS, soft, nontender, nondistended.  PEG/J site not visualized.  MSK: Moves all extremities.  NEURO: Alert and oriented, conversant.  SKIN: Warm, dry. No rash on limited exam.   PSYCH: Mood stable.     Lines, Drains, and Devices:  Peripheral IV 02/16/21 Right;Posterior Lower forearm (Active)   Site Assessment [facility] 02/24/21 1200   Line Status Saline locked;Checked every 1-2 hour 02/24/21 1200   Phlebitis Scale 0-->no symptoms 02/24/21 1200   Infiltration Scale 0 02/24/21 1200   Infiltration Site Treatment Method  None 02/24/21 1200   Number of days: 8       Hemodialysis Vascular Access Arteriovenous fistula Left Arm (Active)   Site Assessment [facility] 02/24/21 1200   Cannulation Needle Size 15 02/23/21 1700   Dressing Intervention New dressing 02/23/21 1700   Dressing Status Clean, dry, intact 02/24/21 1200   Verification by x-ray Not applicable 02/21/21 1600   Hand Off Report Yes 02/23/21 1700   Number of days: 30     Data:     LABS    CMP:   Recent Labs   Lab 02/24/21  0539 02/23/21  0513 02/22/21  0527 02/20/21  0503   * 132* 131* 132*   POTASSIUM 4.3 5.3 4.7 4.2   CHLORIDE 98 98 99 98   CO2 28 28 27 28   ANIONGAP 6 6 5 6   * 140* 136* 154*   BUN 35* 58* 38* 32*   CR 2.18* 3.43* 2.53* 2.37*   GFRESTIMATED 24* 14* 20* 22*   GFRESTBLACK 28* 16* 23* 25*   CHELSEY 9.1 9.2 8.8 8.6   MAG 1.7  --  1.6  --    PROTTOTAL  --   --   --  6.4*   ALBUMIN  --   --   --  2.4*   BILITOTAL  --   --   --  0.4   ALKPHOS  --   --   --  244*   AST  --   --   --  15   ALT  --   --   --  34     CBC:   Recent Labs   Lab 02/23/21  0513 02/22/21  0527 02/20/21  0503 02/19/21  0458   WBC 3.3* 3.0* 2.5* 3.0*   RBC 2.94* 3.14* 3.13* 2.96*   HGB 9.2* 9.7* 10.0* 9.0*   HCT 29.8* 31.1* 31.5* 29.3*   MCV  101* 99 101* 99   MCH 31.3 30.9 31.9 30.4   MCHC 30.9* 31.2* 31.7 30.7*   RDW 20.8* 21.2* 21.5* 21.4*    241 217 207       INR:   Recent Labs   Lab 02/19/21  0458   INR 0.99       Glucose:   Recent Labs   Lab 02/24/21  1227 02/24/21  1132 02/24/21  1104 02/24/21  0738 02/24/21  0539 02/24/21  0405 02/23/21  2354 02/23/21  0513 02/23/21  0513 02/22/21  0527 02/22/21  0527 02/20/21  0503 02/20/21  0503 02/19/21  0458 02/19/21  0458 02/18/21  0430 02/18/21  0430   GLC  --   --   --   --  140*  --   --   --  140*  --  136*  --  154*  --  102*  --  158*   * 101* 59* 130*  --  135* 122*   < >  --    < >  --    < >  --    < >  --    < >  --     < > = values in this interval not displayed.       Blood Gas: No lab results found in last 7 days.    Culture Data   Recent Labs   Lab 02/19/21  0853   CULT No growth after 4 days  Culture negative after 4 days  Moderate growth  Staphylococcus epidermidis  *  Culture received and in progress.  Positive AFB results are called as soon as detected.    Final report to follow in 7 to 8 weeks.    Assayed at [facility] [employer] [address] [place] [address] [id number] [phone]       Virology Data:   Lab Results   Component Value Date    FLUAH1 Not Detected 01/24/2021    FLUAH3 Not Detected 01/24/2021    HY3853 Not Detected 01/24/2021    IFLUB Not Detected 01/24/2021    RSVA Not Detected 01/24/2021    RSVB Not Detected 01/24/2021    PIV1 Not Detected 01/24/2021    PIV2 Not Detected 01/24/2021    PIV3 Not Detected 01/24/2021    HMPV Not Detected 01/24/2021    HRVS Negative 01/24/2021    ADVBE Negative 01/24/2021    ADVC Negative 01/24/2021    ADVC Negative 12/23/2020    ADVC Negative 10/07/2019       Historical CMV results (last 3 of prior testing):  Lab Results   Component Value Date    CMVQNT <137 (A) 01/25/2021    CMVQNT 238 (A) 01/24/2021    CMVQNT 675 (A) 12/23/2020     Lab Results   Component Value Date    CMVLOG <2.1 01/25/2021    CMVLOG 2.4 (H) 01/24/2021    CMVLOG  2.8 (H) 12/23/2020       Urine Studies    Recent Labs   Lab Test 01/24/21  1729 10/21/19  2240   URINEPH 5.0 5.0   NITRITE Negative Negative   LEUKEST Moderate* Large*   WBCU 34* 115*       Most Recent [place] Pulmonary Function Testing (FVC/FEV1 only)  FVC-Pre   Date Value Ref Range Status   12/09/2020 1.12 L    09/09/2020 1.56 L    03/09/2020 1.57 L    02/19/2020 1.78 L      FVC-%Pred-Pre   Date Value Ref Range Status   12/09/2020 34 %    09/09/2020 47 %    03/09/2020 48 %    02/19/2020 54 %      FEV1-Pre   Date Value Ref Range Status   12/09/2020 0.98 L    09/09/2020 1.10 L    03/09/2020 0.96 L    02/19/2020 0.99 L      FEV1-%Pred-Pre   Date Value Ref Range Status   12/09/2020 37 %    09/09/2020 42 %    03/09/2020 37 %    02/19/2020 38 %        IMAGING    No results found for this or any previous visit (from the past 48 hour(s)).

## 2021-02-24 NOTE — PROGRESS NOTES
RN [id number]:     Major Shift Events: BG 59 @ 1100, D50 given with good results. Video swallow @ 1400.     Neuro: A/Ox4, neurologically intact w/o deficits, some generalized weakness.   CV: SR-ST, BP stable. Afebrile.    Resp: NC w/humidication @ 2L, trach dome if unable to handle secretions. Lungs course with strong cough, clearing secretions on own.   GI: Loose watery BM continue, x1 today. Low BG, treated.   : HD, pt anuric.     Plan: Wean from trach dome, continue to monitor secretions. Per pulmonology, NC ok for supplemental O2 if needed, trach dome overnight. Will continue to monitor, assess and alert team with changes.     For vital signs and complete assessments, please see documentation flowsheets.

## 2021-02-24 NOTE — PROGRESS NOTES
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 Nephrology consutled for dialysis needs.     Interval History :   Mrs Blue had HD yesterday, plan for day off today.  May discharge to ARU as soon as tomorrow depending on logistics, will run early in the event this is possible.  Plan for TTS schedule, have been able to run for 3L of UF with runs recently, wt is on the downtrend.  Will back off once we see signs of reaching EDW.       Assessment & Recommendations:   ESRD-Runs at [place] through [facility].  Has atypical HD schedule of Monday and Thursday, has AVF with partial graft which has been challenging to access per RN's.  Needed CRRT 2/5-2/8 due to hemodynamic instability but otherwise has run iHD.  Working on getting to stable 3x/week schedule, may be progressing to trach for resp failure.                   -HD today, have run every other day.                 -Has L arm AVF/graft, somewhat challenging access.                -Removed temp HD line on 2/9.        Volume status-Has mild abdominal edema but improving, will try for 3L again today but would not be surprised if we need to back off to 2L, will try to get standing wt if ambulatory this afternoon.       Electrolytes/pH-K 4.0, bicarb 29.      Ca/phos/pth-Ca 9.1, Mg and Phos mildly up last check.      Anemia-Hgb 9.2, last PRBC's 2/15.  Checked iron stores and sats 36% on 2/3, started EPO 4k with runs.       Nutrition-Nutren TF.       Seen and discussed with Dr Yeung     Recommendations were communicated to primary team via verbal communication.     ADRIÁN Lo  "CNS  Clinical Nurse Specialist  [phone]    Review of Systems:   I reviewed the following systems:  Gen: No fevers or chills  CV: No CP at rest  Resp: No SOB at rest  GI: No N/V    Physical Exam:   I/O last 3 completed shifts:  In: 1590 [NG/GT:440]  Out: 3000 [Other:3000]   /80 (BP Location: Right arm)   Pulse 98   Temp 97.3  F (36.3  C) (Oral)   Resp 20   Ht 1.6 m (5' 2.99\")   Wt 52.2 kg (115 lb)   LMP 06/07/2014 (Exact Date)   SpO2 93%   BMI 20.38 kg/m       GENERAL APPEARANCE: Vent via trached.   EYES: no scleral icterus  Endo: no moon facies  Pulmonary: Intubated, proned, equal expansion.    CV: regular rhythm, normal rate - Edema present  GI: soft, non distended  MS: no evidence of inflammation in joints, no muscle tenderness  :  Basilio present  SKIN: warm, dry, +1 edema.    NEURO: No focal deficits.     Labs:   All labs reviewed by me  Electrolytes/Renal -   Recent Labs   Lab Test 02/24/21  0539 02/23/21  0513 02/22/21  0527 02/15/21  0418 02/15/21  0418 02/14/21  0336 02/13/21  0323   * 132* 131*   < > 136 136 134   POTASSIUM 4.3 5.3 4.7   < > 3.9 3.8 4.2   CHLORIDE 98 98 99   < > 100 100 100   CO2 28 28 27   < > 27 29 26   BUN 35* 58* 38*   < > 52* 32* 52*   CR 2.18* 3.43* 2.53*   < > 2.75* 1.84* 2.59*   * 140* 136*   < > 155* 149* 142*   CHELSEY 9.1 9.2 8.8   < > 8.6 8.6 8.7   MAG 1.7  --  1.6  --  1.9 1.8 2.4*   PHOS  --   --   --   --  4.5 3.6 5.3*    < > = values in this interval not displayed.       CBC -   Recent Labs   Lab Test 02/23/21  0513 02/22/21  0527 02/20/21  0503   WBC 3.3* 3.0* 2.5*   HGB 9.2* 9.7* 10.0*    241 217       LFTs -   Recent Labs   Lab Test 02/20/21  0503 02/09/21  1615 02/09/21  0403   ALKPHOS 244* 218* 182*   BILITOTAL 0.4 0.4 0.3   ALT 34 32 28   AST 15 6 <3   PROTTOTAL 6.4* 6.1* 5.6*   ALBUMIN 2.4* 2.0* 1.8*       Iron Panel -   Recent Labs   Lab Test 02/03/21  0415 12/13/18  1033 08/01/18  0921   IRON 51 16* 93   IRONSAT 36 7* 37   YOLA  --  " 302* 571*           Current Medications:    acetylcysteine  2 mL Nebulization BID     albuterol  2.5 mg Nebulization 4x daily     apixaban ANTICOAGULANT  2.5 mg Oral BID     B and C vitamin Complex with folic acid  5 mL Oral or Feeding Tube Daily     budesonide  0.5 mg Nebulization BID     fiber modular (NUTRISOURCE FIBER)  1 packet Per Feeding Tube BID     insulin aspart  1-12 Units Subcutaneous Q4H     insulin glargine  10 Units Subcutaneous QAM AC     metoprolol tartrate  50 mg Oral or Feeding Tube BID     OLANZapine  10 mg Oral or Feeding Tube Q24H     pantoprazole  40 mg Oral QAM AC     posaconazole  300 mg Per G Tube TID     predniSONE  2.5 mg Oral or Feeding Tube QPM     predniSONE  5 mg Oral or Feeding Tube QAM     protein modular  1 packet Per Feeding Tube BID     [START ON 2/25/2021] sulfamethoxazole-trimethoprim  10 mL Oral or Feeding Tube Once per day on Tue Thu Sat     tacrolimus  2 mg Per G Tube Q24H     tacrolimus  2.5 mg Per G Tube Q24H     valGANciclovir  450 mg Oral Once per day on Mon Thu       dextrose Stopped (02/16/21 1644)     sodium chloride 10 mL/hr at 02/08/21 0400

## 2021-02-24 NOTE — PROGRESS NOTES
[facility]    Medicine Progress Note - Hospitalist Service, Gold 10       Date of Admission:  1/24/2021  Assessment & Plan    Kecia is a 60 year old female who has past medical history of HTN, ESRD on dialysis, 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  Postoperative course was complicated by right mainstem bronchial stenosis requiring several dilations, left-sided aspergillus empyema in 2019, EBV viremia, CMV viremia, and ESRD on HD Monday/Thursday.  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  Respiratory status worsened, and she was intubated and transferred to [facility] on 1/24.  She had decompensation to ARDS on 2/3.  Now s/p tracheostomy on 2/12 and PEG-J on 2/16.  Also s/p dilation of right main bronchus on 2/19.  Stable on trach dome after bronchoscopy, transferred to medicine floor 2/21.     Acute hypoxemic respiratory failure  Sepsis in setting of PJP pneumonia, possible secondary pneumonia  S/p tracheostomy placement (2/12/21)  Presented to outside hospital as mentioned above with acute hypoxemic respiratory failure secondary to PJP pneumonia.  She failed extubation trials x2, developed ARDS.  Required proning, inhaled epoprostenol, pressors.  Then required tracheostomy, which was placed on 2/12.  Also completed a course of steroids with a taper through 2/17 and course of Bactrim X 21 days.  Bronchoscopy on 2/19 and requiring only trach dome since that time.  2/22 #8 Shiley trach exchanged for #6 cuffless shiley per IP   -Cultures from bronchoscopy on 2/19 growing staph epi.   - Transition off continuous TD to humidified nasal cannula, with TD overnight. 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    -Continue Tacro 2 mg every morning and 2.5 mg every evening.  Tacro level ordered for 2/25. Goal 8-20.     -Prednisone 5 mg every morning and 2.5 mg every evening    -Continues on Bactrim every Monday, Wednesday, Friday for PJP ppx    -Continue VGCV for CMV ppx through 2/25 (due to previous high dose steroids)     L Aspergillus Empyema   -Continue posaconazole, plan for indefinite course   - Next level 2/25     ESRD  Prior to hospitalization, HD biweekly. Required CRRT 2/4 to 2/8.  Then transition to intermittent HD.  Access is AVF with partial graft, challenging to access.     -Orders per nephrology team.      -I/O    -Daily weights    -Avoid nephrotoxic medications    -Receives Epogen with HD      Atrial fibrillation with RVR    - EP consulted, appreciate recommendations  - No need for cardioversion or other medical therapy at present as it is intermittent  - Plan for follow up with EP outpatient with zio patch prior   - Continue metoprolol, apixaban.      Hyperglycemia, steroid induced:  - Lantus 10 units daily   - sliding scale insulin   - Hypoglycemia protocol     Additional medical concerns:    hx anxiety: bedtime and as needed olanzapine, hydroxyzine prn  hx anisocoria: no deficits on exam, negative CTH. Monitor.  hx HTN: holding pta meds  seronegative RA with Raynaud's: stable. Monitor.          Diet: Adult Formula Drip Feeding: Continuous Nutren 1.5; Jejunostomy; Goal Rate: 70; mL/hr; From: 4:00 PM; 10:00 AM; Medication - Feeding Tube Flush  Frequency: At least 15-30 mL water before and after medication administration and with tube clogging; Am...  Snacks/Supplements Adult: Other; ensure enlive; Between Meals  Renal Diet (non-dialysis)    DVT Prophylaxis: apixaban  Basilio Catheter: not present  Code Status: Full Code           Disposition Plan   Expected discharge: Tomorrow to [facility]    Entered: Leelee Huang MD 02/24/2021, 4:11 PM       The patient's care was discussed with the Bedside Nurse, Patient and Patient's Family.    Leelee Huang MD  Hospitalist Service, [address]  Contact information available via [facility] Paging/Directory  Please see sign in/sign out for up to date coverage information  ______________________________________________________________________    Interval History   Doing great today  No SOB  No complaints     Data reviewed today: I reviewed all medications, new labs and imaging results over the last 24 hours. I personally reviewed no images or EKG's today.    Physical Exam   Vital Signs: Temp: 98  F (36.7  C) Temp src: Oral BP: 107/73 Pulse: 98   Resp: 20 SpO2: 94 % O2 Device: Nasal cannula Oxygen Delivery: 4 LPM  Weight: 115 lbs 0 oz  Constitutional: no apparent distress, pleasant and cooperative   Cardiovascular: regular rate and rhythm, normal S1 and S2, no murmurs, rubs, or gallops noted, no bilateral lower extremity edema   Respiratory: Tracheostomy in place, coarse breath sounds and diminished throughout, no wheezing, rales, or rhonchi, normal work of breathing   GI: abdomen soft, non tender, non distended, bowel sounds present, PEG-J tube in place without erythema or drainage at site   Neuro: alert and oriented, no gross focal deficits noted     Data   Recent Labs   Lab 02/24/21  0539 02/23/21  0513 02/22/21  0527 02/20/21  0503 02/19/21  0458   WBC  --  3.3* 3.0* 2.5* 3.0*   HGB  --  9.2* 9.7* 10.0* 9.0*   MCV  --  101* 99 101* 99   PLT  --  253 241 217 207   INR  --    --   --   --  0.99   * 132* 131* 132* 134   POTASSIUM 4.3 5.3 4.7 4.2 4.0   CHLORIDE 98 98 99 98 100   CO2 28 28 27 28 29   BUN 35* 58* 38* 32* 46*   CR 2.18* 3.43* 2.53* 2.37* 3.14*   ANIONGAP 6 6 5 6 5   CHELSEY 9.1 9.2 8.8 8.6 8.8   * 140* 136* 154* 102*   ALBUMIN  --   --   --  2.4*  --    PROTTOTAL  --   --   --  6.4*  --    BILITOTAL  --   --   --  0.4  --    ALKPHOS  --   --   --  244*  --    ALT  --   --   --  34  --    AST  --   --   --  15  --      Recent Results (from the past 24 hour(s))   XR Video Swallow with SLP or OT    Narrative    Examination:  Modified Barium Swallow Study with Speech Pathology,  performed 2/24/2021    Comparison: None.    History: Recent tracheostomy to 2/12/2021    Fluoroscopy time: 1.7 minutes.    Findings: Under fluoroscopic guidance, the patient was given orally  administered barium of varying consistencies in the presence of the  speech pathology service.  The oral phase was normal. Consistent delay  of swallow initiating at the piriform. There is flash penetration  without aspiration of thin liquids. No evidence for aspiration with  nectar thickened barium, pudding consistency, or barium coated  cracker. Mild pharyngeal residue.  Other findings: Degenerative changes of the cervical spine.      Impression    Impression:   1. Flash penetration without aspiration of thin liquids.   2. Consistent delay of swallow initiation.    Please see the speech pathologist report for further details regarding  the modified barium swallow study portion of the examination.    I, SUNITA ADKINS MD, attest that I was present in the procedure room  for the entire procedure.    I have personally reviewed the examination and initial interpretation  and I agree with the findings.    SUNITA ADKINS MD     Medications     dextrose Stopped (02/16/21 1644)     sodium chloride 10 mL/hr at 02/08/21 0400       acetylcysteine  2 mL Nebulization BID     albuterol  2.5 mg Nebulization 4x daily      apixaban ANTICOAGULANT  2.5 mg Oral BID     B and C vitamin Complex with folic acid  5 mL Oral or Feeding Tube Daily     budesonide  0.5 mg Nebulization BID     fiber modular (NUTRISOURCE FIBER)  1 packet Per Feeding Tube BID     insulin aspart  1-12 Units Subcutaneous Q4H     insulin glargine  10 Units Subcutaneous QAM AC     metoprolol tartrate  50 mg Oral or Feeding Tube BID     OLANZapine  10 mg Oral or Feeding Tube Q24H     pantoprazole  40 mg Oral QAM AC     posaconazole  300 mg Per G Tube TID     predniSONE  2.5 mg Oral or Feeding Tube QPM     predniSONE  5 mg Oral or Feeding Tube QAM     [START ON 2/25/2021] protein modular  1 packet Per Feeding Tube Daily     [START ON 2/25/2021] sulfamethoxazole-trimethoprim  10 mL Oral or Feeding Tube Once per day on Tue Thu Sat     [START ON 2/25/2021] tacrolimus  2 mg Oral QAM    And     tacrolimus  2.5 mg Oral QPM     valGANciclovir  450 mg Oral Once per day on Mon Thu

## 2021-02-24 NOTE — PLAN OF CARE
Neuro: Patient alert and oriented x4. Using speaking valve for communication. Tolerating well.   Cardiac: NSR. HR 90s. BP' stable. Afebrile.            Respiratory: Sating >92% on 30% trach dome. Shiley 6 cuffless. Suctioned 3 x's in 12 hours.   GI/: Oliguric. BM x1 this shift.   Diet/appetite: NPO. GJ, g clamped, j w/ tube feeds @ 50mL/hr.   Activity:  Assist of 1, up in room. Independently repositioning.   Pain: Denies.   Skin: Blanchable redness noted to coccyx.   LDA's: PIV x1.   Plan: Dialysis completed today for 3L off. Plans for video swallow study tomorrow. Will continue with plan of care.

## 2021-02-24 NOTE — PLAN OF CARE
Neuro: A&Ox4.   Cardiac: SR-ST. VSS.   Respiratory: Sating adequately on trach dome 30%Fio2.  GI/: BM X1  Diet/appetite: Tolerating tube feeds with free water flushes.   Activity:  Assist of 1, up to commode throughout the night.   Pain: At acceptable level on current regimen.   Skin: No new deficits noted.  LDA's: PIV, G/J tube with continues feedings, trach dome, fistula.     Plan: Continue with POC. Notify primary team with changes. Pt slept well overnight.

## 2021-02-25 ENCOUNTER — HOSPITAL ENCOUNTER (INPATIENT)
Facility: CLINIC | Age: 61
LOS: 8 days | Discharge: HOME | End: 2021-03-05
Attending: PHYSICAL MEDICINE & REHABILITATION | Admitting: PHYSICAL MEDICINE & REHABILITATION
Payer: COMMERCIAL

## 2021-02-25 VITALS
WEIGHT: 116.84 LBS | TEMPERATURE: 98 F | BODY MASS INDEX: 20.7 KG/M2 | DIASTOLIC BLOOD PRESSURE: 62 MMHG | SYSTOLIC BLOOD PRESSURE: 100 MMHG | HEIGHT: 63 IN | HEART RATE: 93 BPM | RESPIRATION RATE: 24 BRPM | OXYGEN SATURATION: 98 %

## 2021-02-25 DIAGNOSIS — Z99.2 ESRD (END STAGE RENAL DISEASE) ON DIALYSIS (H): ICD-10-CM

## 2021-02-25 DIAGNOSIS — N18.6 END STAGE RENAL FAILURE ON DIALYSIS (H): ICD-10-CM

## 2021-02-25 DIAGNOSIS — K21.9 GASTROESOPHAGEAL REFLUX DISEASE WITHOUT ESOPHAGITIS: ICD-10-CM

## 2021-02-25 DIAGNOSIS — J96.92 RESPIRATORY FAILURE WITH HYPOXIA AND HYPERCAPNIA, UNSPECIFIED CHRONICITY (H): ICD-10-CM

## 2021-02-25 DIAGNOSIS — Z99.2 END STAGE RENAL FAILURE ON DIALYSIS (H): ICD-10-CM

## 2021-02-25 DIAGNOSIS — J84.9 ILD (INTERSTITIAL LUNG DISEASE) (H): ICD-10-CM

## 2021-02-25 DIAGNOSIS — B59 PNEUMONIA DUE TO PNEUMOCYSTIS JIROVECII, UNSPECIFIED LATERALITY, UNSPECIFIED PART OF LUNG (H): ICD-10-CM

## 2021-02-25 DIAGNOSIS — R11.2 NAUSEA AND VOMITING, INTRACTABILITY OF VOMITING NOT SPECIFIED, UNSPECIFIED VOMITING TYPE: ICD-10-CM

## 2021-02-25 DIAGNOSIS — I10 BENIGN ESSENTIAL HYPERTENSION: ICD-10-CM

## 2021-02-25 DIAGNOSIS — Z94.2 S/P LUNG TRANSPLANT (H): Primary | ICD-10-CM

## 2021-02-25 DIAGNOSIS — N18.6 ESRD (END STAGE RENAL DISEASE) ON DIALYSIS (H): ICD-10-CM

## 2021-02-25 DIAGNOSIS — J96.91 RESPIRATORY FAILURE WITH HYPOXIA AND HYPERCAPNIA, UNSPECIFIED CHRONICITY (H): ICD-10-CM

## 2021-02-25 DIAGNOSIS — R73.9 HYPERGLYCEMIA: ICD-10-CM

## 2021-02-25 LAB
ALBUMIN SERPL-MCNC: 2.6 G/DL (ref 3.4–5)
ALP SERPL-CCNC: 345 U/L (ref 40–150)
ALT SERPL W P-5'-P-CCNC: 41 U/L (ref 0–50)
ANION GAP SERPL CALCULATED.3IONS-SCNC: 5 MMOL/L (ref 3–14)
AST SERPL W P-5'-P-CCNC: 15 U/L (ref 0–45)
BASOPHILS # BLD AUTO: 0.1 10E9/L (ref 0–0.2)
BASOPHILS NFR BLD AUTO: 1.7 %
BILIRUB DIRECT SERPL-MCNC: 0.2 MG/DL (ref 0–0.2)
BILIRUB SERPL-MCNC: 0.4 MG/DL (ref 0.2–1.3)
BUN SERPL-MCNC: 65 MG/DL (ref 7–30)
CALCIUM SERPL-MCNC: 9 MG/DL (ref 8.5–10.1)
CHLORIDE SERPL-SCNC: 98 MMOL/L (ref 94–109)
CO2 SERPL-SCNC: 29 MMOL/L (ref 20–32)
CREAT SERPL-MCNC: 3.25 MG/DL (ref 0.52–1.04)
DIFFERENTIAL METHOD BLD: ABNORMAL
EOSINOPHIL # BLD AUTO: 0.1 10E9/L (ref 0–0.7)
EOSINOPHIL NFR BLD AUTO: 2 %
ERYTHROCYTE [DISTWIDTH] IN BLOOD BY AUTOMATED COUNT: 20.5 % (ref 10–15)
GFR SERPL CREATININE-BSD FRML MDRD: 15 ML/MIN/{1.73_M2}
GLUCOSE BLDC GLUCOMTR-MCNC: 162 MG/DL (ref 70–99)
GLUCOSE BLDC GLUCOMTR-MCNC: 166 MG/DL (ref 70–99)
GLUCOSE BLDC GLUCOMTR-MCNC: 170 MG/DL (ref 70–99)
GLUCOSE BLDC GLUCOMTR-MCNC: 180 MG/DL (ref 70–99)
GLUCOSE BLDC GLUCOMTR-MCNC: 90 MG/DL (ref 70–99)
GLUCOSE SERPL-MCNC: 147 MG/DL (ref 70–99)
HCT VFR BLD AUTO: 32 % (ref 35–47)
HGB BLD-MCNC: 9.9 G/DL (ref 11.7–15.7)
IMM GRANULOCYTES # BLD: 0 10E9/L (ref 0–0.4)
IMM GRANULOCYTES NFR BLD: 0.7 %
LACTATE BLD-SCNC: 0.9 MMOL/L (ref 0.7–2)
LYMPHOCYTES # BLD AUTO: 0.6 10E9/L (ref 0.8–5.3)
LYMPHOCYTES NFR BLD AUTO: 19.7 %
MCH RBC QN AUTO: 31.4 PG (ref 26.5–33)
MCHC RBC AUTO-ENTMCNC: 30.9 G/DL (ref 31.5–36.5)
MCV RBC AUTO: 102 FL (ref 78–100)
MONOCYTES # BLD AUTO: 0.4 10E9/L (ref 0–1.3)
MONOCYTES NFR BLD AUTO: 12.6 %
NEUTROPHILS # BLD AUTO: 1.9 10E9/L (ref 1.6–8.3)
NEUTROPHILS NFR BLD AUTO: 63.3 %
NRBC # BLD AUTO: 0 10*3/UL
NRBC BLD AUTO-RTO: 0 /100
PLATELET # BLD AUTO: 293 10E9/L (ref 150–450)
POSACONAZOLE SERPL-MCNC: <0.2 UG/ML (ref 0.7–5)
POTASSIUM SERPL-SCNC: 5.4 MMOL/L (ref 3.4–5.3)
PROT SERPL-MCNC: 6.6 G/DL (ref 6.8–8.8)
RBC # BLD AUTO: 3.15 10E12/L (ref 3.8–5.2)
SODIUM SERPL-SCNC: 132 MMOL/L (ref 133–144)
TACROLIMUS BLD-MCNC: 8.1 UG/L (ref 5–15)
TME LAST DOSE: NORMAL H
WBC # BLD AUTO: 2.9 10E9/L (ref 4–11)

## 2021-02-25 PROCEDURE — 36415 COLL VENOUS BLD VENIPUNCTURE: CPT | Performed by: PHYSICAL MEDICINE & REHABILITATION

## 2021-02-25 PROCEDURE — 250N000013 HC RX MED GY IP 250 OP 250 PS 637: Performed by: INTERNAL MEDICINE

## 2021-02-25 PROCEDURE — 250N000011 HC RX IP 250 OP 636: Performed by: STUDENT IN AN ORGANIZED HEALTH CARE EDUCATION/TRAINING PROGRAM

## 2021-02-25 PROCEDURE — 999N000157 HC STATISTIC RCP TIME EA 10 MIN

## 2021-02-25 PROCEDURE — 99207 PR CONSULT E&M CHANGED TO SUBSEQUENT LEVEL: CPT | Performed by: PHYSICIAN ASSISTANT

## 2021-02-25 PROCEDURE — 80187 DRUG ASSAY POSACONAZOLE: CPT | Performed by: NURSE PRACTITIONER

## 2021-02-25 PROCEDURE — 90937 HEMODIALYSIS REPEATED EVAL: CPT

## 2021-02-25 PROCEDURE — 250N000011 HC RX IP 250 OP 636: Performed by: CLINICAL NURSE SPECIALIST

## 2021-02-25 PROCEDURE — 272N000078 HC NUTRITION PRODUCT INTERMEDIATE LITER

## 2021-02-25 PROCEDURE — 80048 BASIC METABOLIC PNL TOTAL CA: CPT | Performed by: NURSE PRACTITIONER

## 2021-02-25 PROCEDURE — 999N001017 HC STATISTIC GLUCOSE BY METER IP

## 2021-02-25 PROCEDURE — 258N000003 HC RX IP 258 OP 636: Performed by: CLINICAL NURSE SPECIALIST

## 2021-02-25 PROCEDURE — 80076 HEPATIC FUNCTION PANEL: CPT | Performed by: NURSE PRACTITIONER

## 2021-02-25 PROCEDURE — 128N000003 HC R&B REHAB

## 2021-02-25 PROCEDURE — 99233 SBSQ HOSP IP/OBS HIGH 50: CPT | Performed by: NURSE PRACTITIONER

## 2021-02-25 PROCEDURE — 250N000013 HC RX MED GY IP 250 OP 250 PS 637: Performed by: STUDENT IN AN ORGANIZED HEALTH CARE EDUCATION/TRAINING PROGRAM

## 2021-02-25 PROCEDURE — 94640 AIRWAY INHALATION TREATMENT: CPT

## 2021-02-25 PROCEDURE — 99233 SBSQ HOSP IP/OBS HIGH 50: CPT | Performed by: PHYSICIAN ASSISTANT

## 2021-02-25 PROCEDURE — 250N000013 HC RX MED GY IP 250 OP 250 PS 637: Performed by: PHYSICIAN ASSISTANT

## 2021-02-25 PROCEDURE — 250N000013 HC RX MED GY IP 250 OP 250 PS 637: Performed by: NURSE PRACTITIONER

## 2021-02-25 PROCEDURE — 250N000012 HC RX MED GY IP 250 OP 636 PS 637: Performed by: PHYSICIAN ASSISTANT

## 2021-02-25 PROCEDURE — 250N000009 HC RX 250: Performed by: CLINICAL NURSE SPECIALIST

## 2021-02-25 PROCEDURE — 250N000012 HC RX MED GY IP 250 OP 636 PS 637: Performed by: NURSE PRACTITIONER

## 2021-02-25 PROCEDURE — 36415 COLL VENOUS BLD VENIPUNCTURE: CPT | Performed by: NURSE PRACTITIONER

## 2021-02-25 PROCEDURE — 99223 1ST HOSP IP/OBS HIGH 75: CPT | Performed by: PHYSICAL MEDICINE & REHABILITATION

## 2021-02-25 PROCEDURE — 85025 COMPLETE CBC W/AUTO DIFF WBC: CPT | Performed by: NURSE PRACTITIONER

## 2021-02-25 PROCEDURE — 250N000009 HC RX 250: Performed by: NURSE PRACTITIONER

## 2021-02-25 PROCEDURE — 83605 ASSAY OF LACTIC ACID: CPT | Performed by: PHYSICAL MEDICINE & REHABILITATION

## 2021-02-25 PROCEDURE — 94640 AIRWAY INHALATION TREATMENT: CPT | Mod: 76

## 2021-02-25 PROCEDURE — 80197 ASSAY OF TACROLIMUS: CPT | Performed by: NURSE PRACTITIONER

## 2021-02-25 PROCEDURE — 99238 HOSP IP/OBS DSCHRG MGMT 30/<: CPT | Performed by: STUDENT IN AN ORGANIZED HEALTH CARE EDUCATION/TRAINING PROGRAM

## 2021-02-25 PROCEDURE — 634N000001 HC RX 634: Performed by: CLINICAL NURSE SPECIALIST

## 2021-02-25 PROCEDURE — 250N000012 HC RX MED GY IP 250 OP 636 PS 637: Performed by: INTERNAL MEDICINE

## 2021-02-25 PROCEDURE — 250N000009 HC RX 250: Performed by: PHYSICIAN ASSISTANT

## 2021-02-25 PROCEDURE — 250N000009 HC RX 250: Performed by: STUDENT IN AN ORGANIZED HEALTH CARE EDUCATION/TRAINING PROGRAM

## 2021-02-25 RX ORDER — B COMPLEX C NO.10/FOLIC ACID 900MCG/5ML
5 LIQUID (ML) ORAL DAILY
Status: DISCONTINUED | OUTPATIENT
Start: 2021-02-26 | End: 2021-03-03 | Stop reason: CLARIF

## 2021-02-25 RX ORDER — ACETAMINOPHEN 325 MG/1
325 TABLET ORAL EVERY 4 HOURS PRN
Status: DISCONTINUED | OUTPATIENT
Start: 2021-02-25 | End: 2021-03-05 | Stop reason: HOSPADM

## 2021-02-25 RX ORDER — MIDODRINE HYDROCHLORIDE 10 MG/1
10 TABLET ORAL DAILY PRN
Qty: 30 TABLET | Refills: 0 | Status: ON HOLD
Start: 2021-02-25 | End: 2021-03-04

## 2021-02-25 RX ORDER — LOPERAMIDE HCL 1 MG/7.5ML
2 SUSPENSION ORAL 3 TIMES DAILY PRN
Qty: 118 ML | Refills: 0 | Status: ON HOLD
Start: 2021-02-25 | End: 2021-03-04

## 2021-02-25 RX ORDER — POSACONAZOLE 100 MG/1
300 TABLET, DELAYED RELEASE ORAL 3 TIMES DAILY
Status: DISCONTINUED | OUTPATIENT
Start: 2021-02-25 | End: 2021-03-05 | Stop reason: HOSPADM

## 2021-02-25 RX ORDER — OLANZAPINE 5 MG/1
10 TABLET ORAL AT BEDTIME
Status: DISCONTINUED | OUTPATIENT
Start: 2021-02-25 | End: 2021-03-03

## 2021-02-25 RX ORDER — ACETAMINOPHEN 325 MG/1
325 TABLET ORAL EVERY 4 HOURS PRN
Qty: 60 TABLET
Start: 2021-02-25

## 2021-02-25 RX ORDER — METOPROLOL TARTRATE 50 MG
50 TABLET ORAL 2 TIMES DAILY
Status: DISCONTINUED | OUTPATIENT
Start: 2021-02-25 | End: 2021-02-26

## 2021-02-25 RX ORDER — NICOTINE POLACRILEX 4 MG
15-30 LOZENGE BUCCAL
Status: DISCONTINUED | OUTPATIENT
Start: 2021-02-25 | End: 2021-03-05 | Stop reason: HOSPADM

## 2021-02-25 RX ORDER — SULFAMETHOXAZOLE AND TRIMETHOPRIM 200; 40 MG/5ML; MG/5ML
10 SUSPENSION ORAL
Qty: 120 ML | Refills: 0 | Status: ON HOLD
Start: 2021-02-25 | End: 2021-03-04

## 2021-02-25 RX ORDER — ALBUTEROL SULFATE 0.83 MG/ML
2.5 SOLUTION RESPIRATORY (INHALATION) 4 TIMES DAILY
Status: DISCONTINUED | OUTPATIENT
Start: 2021-02-25 | End: 2021-03-05 | Stop reason: HOSPADM

## 2021-02-25 RX ORDER — GUAR GUM
1 PACKET (EA) ORAL 2 TIMES DAILY
Qty: 60 PACKET | Refills: 0 | Status: ON HOLD
Start: 2021-02-25 | End: 2021-03-04

## 2021-02-25 RX ORDER — PREDNISONE 5 MG/1
5 TABLET ORAL DAILY
Status: DISCONTINUED | OUTPATIENT
Start: 2021-02-26 | End: 2021-03-05 | Stop reason: HOSPADM

## 2021-02-25 RX ORDER — ALBUTEROL SULFATE 0.83 MG/ML
2.5 SOLUTION RESPIRATORY (INHALATION) 4 TIMES DAILY
Qty: 360 ML | Refills: 0 | Status: ON HOLD
Start: 2021-02-25 | End: 2021-03-04

## 2021-02-25 RX ORDER — BUDESONIDE 0.5 MG/2ML
0.5 INHALANT ORAL 2 TIMES DAILY
Qty: 60 AMPULE | Refills: 0 | Status: ON HOLD
Start: 2021-02-25 | End: 2021-03-04

## 2021-02-25 RX ORDER — ACETYLCYSTEINE 200 MG/ML
2 SOLUTION ORAL; RESPIRATORY (INHALATION) 2 TIMES DAILY
Qty: 120 ML | Refills: 0
Start: 2021-02-25 | End: 2021-02-25

## 2021-02-25 RX ORDER — DEXTROSE MONOHYDRATE 25 G/50ML
25-50 INJECTION, SOLUTION INTRAVENOUS
Status: DISCONTINUED | OUTPATIENT
Start: 2021-02-25 | End: 2021-03-05 | Stop reason: HOSPADM

## 2021-02-25 RX ORDER — AMINO AC/PROTEIN HYDR/WHEY PRO 10G-100/30
1 LIQUID (ML) ORAL 2 TIMES DAILY
Status: DISCONTINUED | OUTPATIENT
Start: 2021-02-25 | End: 2021-03-01 | Stop reason: CLARIF

## 2021-02-25 RX ORDER — BUDESONIDE 0.5 MG/2ML
0.5 INHALANT ORAL 2 TIMES DAILY
Status: DISCONTINUED | OUTPATIENT
Start: 2021-02-25 | End: 2021-03-05 | Stop reason: HOSPADM

## 2021-02-25 RX ORDER — POSACONAZOLE 100 MG/1
300 TABLET, DELAYED RELEASE ORAL DAILY
Qty: 90 TABLET | Refills: 2 | Status: ON HOLD
Start: 2021-02-25 | End: 2021-03-04

## 2021-02-25 RX ORDER — OLANZAPINE 10 MG/1
10 TABLET ORAL AT BEDTIME
Qty: 30 TABLET | Refills: 0 | Status: ON HOLD
Start: 2021-02-25 | End: 2021-03-04

## 2021-02-25 RX ORDER — AMINO AC/PROTEIN HYDR/WHEY PRO 10G-100/30
1 LIQUID (ML) ORAL DAILY
Qty: 30 PACKET | Refills: 0
Start: 2021-02-25 | End: 2021-03-27

## 2021-02-25 RX ORDER — PHENOL 1.4 %
10 AEROSOL, SPRAY (ML) MUCOUS MEMBRANE
Qty: 30 TABLET | Refills: 0
Start: 2021-02-25 | End: 2021-04-08

## 2021-02-25 RX ORDER — SULFAMETHOXAZOLE AND TRIMETHOPRIM 200; 40 MG/5ML; MG/5ML
10 SUSPENSION ORAL
Status: DISCONTINUED | OUTPATIENT
Start: 2021-02-26 | End: 2021-03-03 | Stop reason: CLARIF

## 2021-02-25 RX ORDER — AMOXICILLIN 250 MG
2 CAPSULE ORAL 2 TIMES DAILY PRN
Qty: 60 TABLET | Refills: 0 | Status: ON HOLD
Start: 2021-02-25 | End: 2021-03-04

## 2021-02-25 RX ORDER — PREDNISONE 2.5 MG/1
2.5 TABLET ORAL EVERY EVENING
Status: DISCONTINUED | OUTPATIENT
Start: 2021-02-25 | End: 2021-03-05 | Stop reason: HOSPADM

## 2021-02-25 RX ORDER — DEXTROSE MONOHYDRATE 100 MG/ML
INJECTION, SOLUTION INTRAVENOUS CONTINUOUS PRN
Status: DISCONTINUED | OUTPATIENT
Start: 2021-02-25 | End: 2021-03-05 | Stop reason: HOSPADM

## 2021-02-25 RX ORDER — GUAR GUM
1 PACKET (EA) ORAL 2 TIMES DAILY
Status: DISCONTINUED | OUTPATIENT
Start: 2021-02-25 | End: 2021-03-04 | Stop reason: CLARIF

## 2021-02-25 RX ORDER — ACETYLCYSTEINE 100 MG/ML
4 SOLUTION ORAL; RESPIRATORY (INHALATION) 2 TIMES DAILY
Status: DISCONTINUED | OUTPATIENT
Start: 2021-02-25 | End: 2021-02-25 | Stop reason: HOSPADM

## 2021-02-25 RX ORDER — METOPROLOL TARTRATE 50 MG
50 TABLET ORAL 2 TIMES DAILY
Qty: 60 TABLET | Refills: 0 | Status: ON HOLD
Start: 2021-02-25 | End: 2021-03-04

## 2021-02-25 RX ORDER — ACETYLCYSTEINE 100 MG/ML
4 SOLUTION ORAL; RESPIRATORY (INHALATION) 2 TIMES DAILY
Status: ON HOLD | DISCHARGE
Start: 2021-02-25 | End: 2021-03-04

## 2021-02-25 RX ORDER — ACETYLCYSTEINE 100 MG/ML
4 SOLUTION ORAL; RESPIRATORY (INHALATION) 2 TIMES DAILY
Status: DISCONTINUED | OUTPATIENT
Start: 2021-02-25 | End: 2021-02-26

## 2021-02-25 RX ADMIN — MIDODRINE HYDROCHLORIDE 10 MG: 5 TABLET ORAL at 10:11

## 2021-02-25 RX ADMIN — Medication 1 PACKET: at 21:04

## 2021-02-25 RX ADMIN — INSULIN ASPART 2 UNITS: 100 INJECTION, SOLUTION INTRAVENOUS; SUBCUTANEOUS at 22:34

## 2021-02-25 RX ADMIN — INSULIN ASPART 2 UNITS: 100 INJECTION, SOLUTION INTRAVENOUS; SUBCUTANEOUS at 03:29

## 2021-02-25 RX ADMIN — ACETYLCYSTEINE 4 ML: 100 INHALANT RESPIRATORY (INHALATION) at 20:35

## 2021-02-25 RX ADMIN — INSULIN ASPART 1 UNITS: 100 INJECTION, SOLUTION INTRAVENOUS; SUBCUTANEOUS at 12:13

## 2021-02-25 RX ADMIN — APIXABAN 2.5 MG: 2.5 TABLET, FILM COATED ORAL at 07:53

## 2021-02-25 RX ADMIN — ONDANSETRON 4 MG: 2 INJECTION INTRAMUSCULAR; INTRAVENOUS at 10:16

## 2021-02-25 RX ADMIN — IRON SUCROSE 100 MG: 20 INJECTION, SOLUTION INTRAVENOUS at 10:21

## 2021-02-25 RX ADMIN — PREDNISONE 5 MG: 5 TABLET ORAL at 07:53

## 2021-02-25 RX ADMIN — Medication 1 PACKET: at 07:54

## 2021-02-25 RX ADMIN — INSULIN ASPART 1 UNITS: 100 INJECTION, SOLUTION INTRAVENOUS; SUBCUTANEOUS at 07:53

## 2021-02-25 RX ADMIN — ALBUTEROL SULFATE 2.5 MG: 2.5 SOLUTION RESPIRATORY (INHALATION) at 15:50

## 2021-02-25 RX ADMIN — LIDOCAINE HYDROCHLORIDE 0.5 ML: 10 INJECTION, SOLUTION EPIDURAL; INFILTRATION; INTRACAUDAL; PERINEURAL at 10:05

## 2021-02-25 RX ADMIN — ALBUTEROL SULFATE 2.5 MG: 2.5 SOLUTION RESPIRATORY (INHALATION) at 08:19

## 2021-02-25 RX ADMIN — Medication: at 10:04

## 2021-02-25 RX ADMIN — ACETYLCYSTEINE 2 ML: 200 SOLUTION ORAL; RESPIRATORY (INHALATION) at 08:19

## 2021-02-25 RX ADMIN — SODIUM CHLORIDE 300 ML: 9 INJECTION, SOLUTION INTRAVENOUS at 10:03

## 2021-02-25 RX ADMIN — BUDESONIDE 0.5 MG: 0.5 INHALANT ORAL at 08:19

## 2021-02-25 RX ADMIN — Medication 5 ML: at 07:53

## 2021-02-25 RX ADMIN — APIXABAN 2.5 MG: 2.5 TABLET, FILM COATED ORAL at 21:04

## 2021-02-25 RX ADMIN — Medication 40 MG: at 07:53

## 2021-02-25 RX ADMIN — POSACONAZOLE 300 MG: 100 TABLET, COATED ORAL at 21:05

## 2021-02-25 RX ADMIN — TACROLIMUS 2 MG: 5 CAPSULE ORAL at 07:53

## 2021-02-25 RX ADMIN — TACROLIMUS 2.5 MG: 5 CAPSULE ORAL at 18:21

## 2021-02-25 RX ADMIN — PREDNISONE 2.5 MG: 2.5 TABLET ORAL at 21:04

## 2021-02-25 RX ADMIN — SODIUM CHLORIDE 250 ML: 9 INJECTION, SOLUTION INTRAVENOUS at 10:03

## 2021-02-25 RX ADMIN — ALBUTEROL SULFATE 2.5 MG: 2.5 SOLUTION RESPIRATORY (INHALATION) at 20:35

## 2021-02-25 RX ADMIN — INSULIN GLARGINE 10 UNITS: 100 INJECTION, SOLUTION SUBCUTANEOUS at 07:53

## 2021-02-25 RX ADMIN — BUDESONIDE 0.5 MG: 0.5 INHALANT RESPIRATORY (INHALATION) at 20:35

## 2021-02-25 RX ADMIN — POSACONAZOLE 300 MG: 40 SUSPENSION ORAL at 07:53

## 2021-02-25 RX ADMIN — OLANZAPINE 10 MG: 5 TABLET, FILM COATED ORAL at 21:04

## 2021-02-25 RX ADMIN — POSACONAZOLE 300 MG: 40 SUSPENSION ORAL at 13:50

## 2021-02-25 RX ADMIN — EPOETIN ALFA-EPBX 4000 UNITS: 10000 INJECTION, SOLUTION INTRAVENOUS; SUBCUTANEOUS at 12:08

## 2021-02-25 ASSESSMENT — ACTIVITIES OF DAILY LIVING (ADL)
ADLS_ACUITY_SCORE: 15

## 2021-02-25 ASSESSMENT — MIFFLIN-ST. JEOR
SCORE: 1079
SCORE: 1109
SCORE: 1047.18

## 2021-02-25 NOTE — H&P
"    [facility]   Acute Rehabilitation Unit  Admission History and Physical    CHIEF COMPLAINT   Pneumonia, general deconditioning after hospital stay.     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 She was extuabted 1/26/21, course was complicated by ARDS requiring requiring reintubation, proning, in context of ongoing PJP pneumonia.  She underwent trach placement 2/12/21 and PEG-J 2/16/21.  Underwent dilation of right main stem bronchus 2/19/21.       During acute hospitalization, patient was seen and evaluated by PT, speech therapy and OT, who collectively recommended that patient would benefit from ongoing therapies in the acute inpatient rehabilitation setting.       In review of the therapy notes she is currently performing sit to stand with CGA, though needs min assist from lower surfaces for sit to stand transfer.  Ambulating up to 200 feet with FWW and CGA. Tolerating standing at sink for 6-8 minutes with needed seated rest breaks due to lightheadedness or fatigue with SBA during task . Diet advance to regular textures 2/24/21 with recommendation for follow up for safe swallow strategies.  Tolerating passymuir valve, with ongoing education needed per SLP.     Kecia states that she is looking forward to her rehab stay. Her only concerns at this time are being tired after dialysis and being sent home \"too soon.\"  On her dialysis days, she would like to do her therapies prior to her dialysis as she states she will be too tired after dialysis to participate " in therapies.  She states that she does not want to go home until she is safe to go home as she does not want to have to come back to the hospital.      PAST MEDICAL HISTORY   Reviewed and updated in Epic.  Past Medical History:   Diagnosis Date     Abdominal pain 10/6/2019     Acute on chronic respiratory failure with hypoxia (H) 2/21/2018     Antisynthetase syndrome (H) 2014     Chronic cough      Chronic infection     recent C diff  8/18     Dehydration 8/1/2018     Dehydration 8/1/2018     Dermatomyositis (H)      Dysplasia of cervix, low grade (ESTRADA 1)      ILD (interstitial lung disease) (H)      Nausea and vomiting 12/4/2018     Osteopenia      PONV (postoperative nausea and vomiting)      Pulmonary hypertension (H)      Raynaud's disease      Seronegative rheumatoid arthritis (H)      SURGICAL HISTORY  Reviewed and updated in Epic.  Past Surgical History:   Procedure Laterality Date     BRONCHOSCOPY (RIGID OR FLEXIBLE), DIAGNOSTIC N/A 4/10/2018    Procedure: COMBINED BRONCHOSCOPY (RIGID OR FLEXIBLE), LAVAGE;;  Surgeon: Mariposa Donohue MD;  Location: [facility]     BRONCHOSCOPY (RIGID OR FLEXIBLE), DIAGNOSTIC N/A 12/23/2020    Procedure: BRONCHOSCOPY, WITH BRONCHOALVEOLAR LAVAGE;  Surgeon: Uri Bass MD;  Location: [facility]     BRONCHOSCOPY (RIGID OR FLEXIBLE), DILATE BRONCHUS / TRACHEA N/A 10/11/2018    Procedure: BRONCHOSCOPY (RIGID OR FLEXIBLE), DILATE BRONCHUS / TRACHEA;  Flexible And Rigid Bronchoscopy and Dilation;  Surgeon: Wilber Lin MD;  [facility]     BRONCHOSCOPY FLEXIBLE N/A 3/13/2018    Procedure: BRONCHOSCOPY FLEXIBLE;  Flexible Bronchoscopy ;  Surgeon: Gissell Sanchez MD;  [facility]: [facility]     BRONCHOSCOPY FLEXIBLE N/A 5/9/2018    Procedure: BRONCHOSCOPY FLEXIBLE;;  Surgeon: Wilber Lin MD;  [facility]     BRONCHOSCOPY FLEXIBLE AND RIGID N/A 9/10/2018    Procedure: BRONCHOSCOPY FLEXIBLE AND RIGID;  Flexible and Rigid Bronchoscopy with Balloon Dilation,  tissue debulking with cryo, and Right mainstem bronchus stent placement;  Surgeon: Wilber Lin MD;  [facility]     BRONCHOSCOPY RIGID N/A 6/6/2018    Procedure: BRONCHOSCOPY RIGID;;  Surgeon: Lopez Macias MD;  [facility]     BRONCHOSCOPY, DILATE BRONCHUS, STENT BRONCHUS, COMBINED N/A 6/11/2018    Procedure: COMBINED BRONCHOSCOPY, DILATE BRONCHUS, STENT BRONCHUS;  Flexible Bronchoscopy, Balloon Dilation, Bronchial Washings;  Surgeon: Wilber Lin MD;  [facility]     BRONCHOSCOPY, DILATE BRONCHUS, STENT BRONCHUS, COMBINED Right 7/10/2018    Procedure: COMBINED BRONCHOSCOPY, DILATE BRONCHUS, STENT BRONCHUS;  Flexible Bronchoscopy, Balloon Dilation, Bronchial Washings  ;  Surgeon: Wilber Lin MD;  [facility]     BRONCHOSCOPY, DILATE BRONCHUS, STENT BRONCHUS, COMBINED N/A 8/2/2018    Procedure: COMBINED BRONCHOSCOPY, DILATE BRONCHUS, STENT BRONCHUS;  Flexible Bronchoscopy, Bronchial Washings, Balloon Dilation;  Surgeon: Wilber Lin MD;  [facility]     BRONCHOSCOPY, DILATE BRONCHUS, STENT BRONCHUS, COMBINED N/A 8/20/2018    Procedure: COMBINED BRONCHOSCOPY, DILATE BRONCHUS, STENT BRONCHUS;  Flexible Bronchoscopy, Balloon Dilation;  Surgeon: Wilber Lin MD;  [facility]     BRONCHOSCOPY, DILATE BRONCHUS, STENT BRONCHUS, COMBINED N/A 10/29/2018    Procedure: Flexible Bronchoscopy, Balloon Dilation, Stent Revision, Airway Examination And Therapeutic Suctioning, Cyro Tumor Debulking;  Surgeon: Wilber Lin MD;  [facility]     BRONCHOSCOPY, DILATE BRONCHUS, STENT BRONCHUS, COMBINED N/A 11/20/2018    Procedure: Rigid Bronchoscopy, Stent Removal and dilitation;  Surgeon: Wilber Lin MD;  [facility]     BRONCHOSCOPY, DILATE BRONCHUS, STENT BRONCHUS, COMBINED N/A 12/14/2018    Procedure: Flexible And Rigid Bronchoscopy, Balloon Dilation, Bronchial Washing;  Surgeon: Wilber Lin MD;  [facility]     BRONCHOSCOPY, DILATE BRONCHUS, STENT BRONCHUS, COMBINED N/A 1/17/2019    Procedure: Flexible And Rigid Bronchoscopy, Balloon Dilation.;  Surgeon: Wilber Lin MD;  [facility]     BRONCHOSCOPY, DILATE BRONCHUS, STENT BRONCHUS, COMBINED N/A 3/7/2019    Procedure: Flexible and Rigid Bronchoscopy, Bronchial Washing, Balloon Dilation;  Surgeon: Wilber Lin MD;  [facility]     BRONCHOSCOPY, DILATE BRONCHUS, STENT BRONCHUS, COMBINED N/A 6/6/2019    Procedure: Rigid and Flexible Bronchoscopy, Balloon Dilation;  Surgeon: Wilber Lin MD;  [facility]     BRONCHOSCOPY, DILATE BRONCHUS, STENT BRONCHUS, COMBINED N/A 10/11/2019    Procedure: Flexible and Rigid Bronchoscopy, Balloon Dilation, Bronchoalveolar Lagave;  Surgeon: Wilber Lin MD;  [facility]     BRONCHOSCOPY, DILATE BRONCHUS, STENT BRONCHUS, COMBINED N/A 2/19/2021    Procedure: BRONCHOSCOPY, flexible, airway dilation, and bronchial wash;  Surgeon: Wilber Lin MD;  [facility]     CV RIGHT HEART CATH MEASUREMENTS RECORDED N/A 3/10/2020    Procedure: CV RIGHT HEART CATH;  Surgeon: Wai Garcia MD;  Location:  HEART CARDIAC CATH LAB     ENT SURGERY      tonsillectomy as a child     ESOPHAGOSCOPY, GASTROSCOPY, DUODENOSCOPY (EGD), COMBINED N/A 10/29/2018    Procedure: COMBINED ESOPHAGOSCOPY, GASTROSCOPY, DUODENOSCOPY (EGD) with biopsies and polypectomy;  Surgeon: Chente Bloom MD;  [facility]     INSERT EXTRACORPORAL MEMBRANE OXYGENATOR ADULT (OUTSIDE OR) N/A 2/27/2018    Procedure: INSERT EXTRACORPORAL MEMBRANE OXYGENATOR ADULT (OUTSIDE OR);  INSERT EXTRACORPORAL MEMBRANE OXYGENATOR ADULT (OUTSIDE OR) ;  Surgeon: Toby Hernandez MD;  [facility]     IR CVC TUNNEL PLACEMENT > 5 YRS OF AGE  10/25/2019     IR GASTRO JEJUNOSTOMY TUBE PLACEMENT  2/16/2021     IR PARACENTESIS  1/8/2020     IR THORACENTESIS  9/13/2019     IR TRANSCATHETER BIOPSY  1/8/2020      no prior surgery       REMOVE EXTRACORPORAL MEMBRANE OXYGENATOR ADULT N/A 3/3/2018    Procedure: REMOVE EXTRACORPORAL MEMBRANE OXYGENATOR ADULT;  Removal of Right Femoral Venous and Right Axillary Arterial Extracorporeal Membrane Oxygenator;  Surgeon: Toby Hernandez MD;  [facility]     TRANSPLANT LUNG RECIPIENT SINGLE X2 Bilateral 3/1/2018    Procedure: TRANSPLANT LUNG RECIPIENT SINGLE X2;  Median Sternotomy, Extracorporeal Membrane Oxygenator, bilateral sequential lung transplant;  Surgeon: Toby Hernandez MD;  Location:  OR       SOCIAL HISTORY  Reviewed and updated in Epic.  Marital Status:  to , Ranjan.  Living situation: lives in house ramp to enter walk in shower  Family support: Supportive.  Has 3 daughters (and 5 grandchildren) all whom live within 20 miles.  Vocational History:Patient works from home.  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  Tobacco use: none  Alcohol use: none  Illicit drug use: none  Social History     Socioeconomic History     Marital status:      Spouse name: Not on file     Number of children: Not on file     Years of education: Not on file     Highest education level: Not on file   Occupational History     Not on file   Social Needs     Financial resource strain: Not on file     Food insecurity     Worry: Not on file     Inability: Not on file     Transportation needs     Medical: Not on file     Non-medical: Not on file   Tobacco Use     Smoking status: Never Smoker     Smokeless tobacco: Never Used   Substance and Sexual Activity     Alcohol use: No     Alcohol/week: 1.0 standard drinks     Types: 1 Glasses of wine per week     Drug use: No     Sexual activity: Not on file   Lifestyle     Physical activity     Days per week: Not on file     Minutes per session: Not on file     Stress: Not on file   Relationships     Social connections     Talks on phone: Not on file     Gets together: Not on file     Attends Latter-day service:  Not on file     Active member of club or organization: Not on file     Attends meetings of clubs or organizations: Not on file     Relationship status: Not on file     Intimate partner violence     Fear of current or ex partner: Not on file     Emotionally abused: Not on file     Physically abused: Not on file     Forced sexual activity: Not on file   Other Topics Concern     Parent/sibling w/ CABG, MI or angioplasty before 65F 55M? No   Social History Narrative    3/6/2019 - Lives with . Has three daughters. Four grandchildren (two ). No pets. Travelled previously to [facility]. Has visited [place] several times.        FAMILY HISTORY  Reviewed and updated in Epic.  Patient also states that her mother had multiple lower extremity DVTs  Family History   Problem Relation Age of Onset     Hypertension Mother      Arthritis Mother      Pancreatic Cancer Father      Diabetes Father        PRIOR FUNCTIONAL HISTORY   Patient with frequent/ recurrent illness since transplant with limited activity and limited activity tolerance.  Unable to navigate stairs (says she could do this with difficulty but could typically avoid stairs in her home), ambulates ~ 50 feet prior to noting fatigue.  States she was independent with all ADLs and was cooking and driving prior to this hospitalization.     MEDICATIONS  Scheduled meds  Medications Prior to Admission   Medication Sig Dispense Refill Last Dose     acetaminophen (TYLENOL) 325 MG tablet Take 1 tablet (325 mg) by mouth every 4 hours as needed for mild pain or fever 60 tablet       acetylcysteine (MUCOMYST) 10 % nebulizer solution Inhale 4 mLs into the lungs 2 times daily        albuterol (PROVENTIL) (2.5 MG/3ML) 0.083% neb solution Take 1 vial (2.5 mg) by nebulization 4 times daily 360 mL 0      apixaban ANTICOAGULANT (ELIQUIS) 2.5 MG tablet Take 1 tablet (2.5 mg) by mouth 2 times daily 60 tablet 0      B Complex-C-Folic Acid (DIALYVITE) TABS Take 1 tablet by  mouth daily        budesonide (PULMICORT) 0.5 MG/2ML neb solution Take 2 mLs (0.5 mg) by nebulization 2 times daily 60 ampule 0      fluticasone (FLONASE) 50 MCG/ACT nasal spray Spray 1 spray into both nostrils daily 15.8 mL 0      Guar Gum (FIBER MODULAR, NUTRISOURCE FIBER,) packet 1 packet by Per Feeding Tube route 2 times daily 60 packet 0      insulin aspart (NOVOLOG PEN) 100 UNIT/ML pen Inject 1-12 Units Subcutaneous every 4 hours Follow sliding scale per discharge instructions 21.6 mL 0      insulin glargine (LANTUS PEN) 100 UNIT/ML pen Inject 10 Units Subcutaneous every morning (before breakfast) 3 mL 0      loperamide (IMODIUM) 1 MG/7.5ML liquid 15 mLs (2 mg) by Oral or GJ tube route 3 times daily as needed for diarrhea 118 mL 0      melatonin 10 MG TABS tablet 1 tablet (10 mg) by Oral or Feeding Tube route nightly as needed for sleep 30 tablet 0      metoprolol tartrate (LOPRESSOR) 50 MG tablet 1 tablet (50 mg) by Oral or Feeding Tube route 2 times daily 60 tablet 0      midodrine (PROAMATINE) 10 MG tablet Take 1 tablet (10 mg) by mouth daily as needed (With dialysis) 30 tablet 0      OLANZapine (ZYPREXA) 10 MG tablet 1 tablet (10 mg) by Oral or Feeding Tube route At Bedtime 30 tablet 0      pantoprazole (PROTONIX) 2 mg/mL SUSP suspension Take 20 mLs (40 mg) by mouth every morning (before breakfast) 600 mL 0      posaconazole (NOXAFIL) 100 MG EC tablet Take 3 tablets (300 mg) by mouth daily 90 tablet 2      predniSONE 2.5 MG PO tablet Take two tablets (5mg) every AM and one tablet (2.5mg) every evening 90 tablet 11      protein modular (PROSOURCE TF) LIQD 1 packet by Per Feeding Tube route daily 30 packet 0      senna-docusate (SENOKOT-S/PERICOLACE) 8.6-50 MG tablet 2 tablets by Oral or Feeding Tube route 2 times daily as needed for constipation 60 tablet 0      sulfamethoxazole-trimethoprim (BACTRIM/SEPTRA) 8 mg/mL suspension 10 mLs (80 mg) by Oral or Feeding Tube route three times a week 120 mL 0       "tacrolimus (GENERIC EQUIVALENT) 1 mg/mL suspension Take 2 mLs (2 mg) by mouth every morning 60 mL 0      tacrolimus (GENERIC EQUIVALENT) 1 mg/mL suspension Take 2.5 mLs (2.5 mg) by mouth every evening 75 mL 0      ALLERGIES   No Known Allergies    REVIEW OF SYSTEMS  A 10 point ROS was performed and negative unless otherwise noted in HPI.     Constitutional: Denies any fevers, chills, malaise  Eyes: Denies any blurred vision or eye pain.  Ears, Nose, Throat: Negative for changes in hearing, sore throat, significant nasal congestion.  Cardiovascular: Denies chest pain, swelling of the extremities.  Respiratory: + for cough that is intermittently productive.  Denies difficulty breathing/SOB.    Gastrointestinal: Denies abdominal pain or bloating.  Having regular bowel movements, reports having one the morning of 2-25-21.  Genitourinary:  Denies any incontinence or dysuria.   Musculoskeletal: Denies any significant pain.  States she has chronic bilateral knee pain.  Neurologic: Positive for generalized weakness.  Denies any headaches, numbness/tinging.   Psychiatric: Positive for mild anxiety.  Denies any signifcant  depression.      PHYSICAL EXAM  VITAL SIGNS:  /59 (BP Location: Right arm)   Pulse 98   Temp 97.8  F (36.6  C) (Oral)   Resp 16   Ht 1.6 m (5' 3\")   Wt 49.8 kg (109 lb 12.8 oz)   LMP 06/07/2014 (Exact Date)   SpO2 100%   BMI 19.45 kg/m    BMI:  Estimated body mass index is 19.45 kg/m  as calculated from the following:    Height as of this encounter: 1.6 m (5' 3\").    Weight as of this encounter: 49.8 kg (109 lb 12.8 oz).     General: Patient sitting up in bed with bright affect.  Non toxic appearing.   HEENT: Non injected sclera.  EOMI.  Moist oral mucosa.  Trach in place and appears clean and dry without any redness around the skin.    Pulmonary: Patient is moving air fairly well but there are crackles in the bilateral lung bases with some coarse lung sounds.  No obvious wheezes or rhonchi " appreciated.    Cardiovascular: Regular, rate and rhythm.  No obvious murmurs, rubs or gallops appreciated.   Abdominal: Positive bowel sounds bilaterally.  No tenderness in the bilateral upper or lower quadrants.  No distention.  GJ tube area is clean, dry and without erythema or obvious skin break down.   Extremities: warm, well perfused, no edema in bilateral lower extremities.    MSK/neuro:   Mental Status:  alert and oriented x3    Cranial Nerves: grossly normal   1. 2nd CN: Pupils equal, round  2. 3rd,4th,6th CN:  EOM  3. 5th CN: facial sensation intact   4. 7th CN: face symmetrical   5. 8th CN: functional hearing bilaterally  6. 9th, 10th CN: palate elevates symmetrically   7. 11th CN: sternocleidomastoids and trapezii strong   8. 12th CN: tongue midline and without fasciculations     Sensory: Normal to light touch in bilateral upper and lower extremities    Strength:    SF  EF  EE  WE  G  I  HF  KE  DF  EHL  PF   R  5            5          5          5          5          5          4          5           5           5           5  L  5           5            5        5           5          5          5          5           5           5          5   Mendieta's test: negative bilaterally    Tone per modified Petrona Scale: 0   Abnormal movements: None    Speech:Clear   Cognition:Normal   Gait: Not assessed.  Skin: No skin breakdown near the trach site.  No skin breakdown near the JG tube site.      LABS  Lab Results   Component Value Date    WBC 2.9 02/25/2021     Lab Results   Component Value Date    RBC 3.15 02/25/2021     Lab Results   Component Value Date    HGB 9.9 02/25/2021     Lab Results   Component Value Date    HCT 32.0 02/25/2021     Lab Results   Component Value Date     02/25/2021     Lab Results   Component Value Date    MCH 31.4 02/25/2021     Lab Results   Component Value Date    MCHC 30.9 02/25/2021     Lab Results   Component Value Date    RDW 20.5 02/25/2021     Lab Results    Component Value Date     02/25/2021     Last Comprehensive Metabolic Panel:  Sodium   Date Value Ref Range Status   02/25/2021 132 (L) 133 - 144 mmol/L Final     Potassium   Date Value Ref Range Status   02/25/2021 5.4 (H) 3.4 - 5.3 mmol/L Final     Chloride   Date Value Ref Range Status   02/25/2021 98 94 - 109 mmol/L Final     Carbon Dioxide   Date Value Ref Range Status   02/25/2021 29 20 - 32 mmol/L Final     Anion Gap   Date Value Ref Range Status   02/25/2021 5 3 - 14 mmol/L Final     Glucose   Date Value Ref Range Status   02/25/2021 147 (H) 70 - 99 mg/dL Final     Urea Nitrogen   Date Value Ref Range Status   02/25/2021 65 (H) 7 - 30 mg/dL Final     Creatinine   Date Value Ref Range Status   02/25/2021 3.25 (H) 0.52 - 1.04 mg/dL Final     GFR Estimate   Date Value Ref Range Status   02/25/2021 15 (L) >60 mL/min/[1.73_m2] Final     Comment:     Non  GFR Calc  Starting 12/18/2018, serum creatinine based estimated GFR (eGFR) will be   calculated using the Chronic Kidney Disease Epidemiology Collaboration   (CKD-EPI) equation.       Calcium   Date Value Ref Range Status   02/25/2021 9.0 8.5 - 10.1 mg/dL Final       IMPRESSION/PLAN:    Kecia is a 60 year old woman with past medical history of HTN, ESRD on dialysis, ILD s/p bilateral lung transplant 03/2018. With complicated postoperative course including: right mainstem bronchial stenosis aspergillus empyema, EBV viremia, CMV viremia.  Kecia was admitted to an outside hospital on 1/22 with acute hypoxemic respiratory failure and new lung opacities on imaging, was ultimately diagnosed with PJP pneumonia, she was intubated and transferred to [facility] on 1/24, course further complicated by ARDS on 2/3, prolonged hypoxic respiratory failure s/p tracheostomy on 2/12 and PEG-J on 2/16, underwent dilation of right main bronchus on 2/19.  Admitted to acute rehab unit 2/25/21 for ongoing rehabilitation and medical management.     Admission to acute  inpatient rehab ARDS, PJP pneumonia.   Impairment group code: 10.9      1. PT, OT and SLP 60 minutes of each on a daily basis, in addition to rehab nursing and close management of physiatrist.      2. Impairment of ADL's: Noted to have impaired strength, impaired activity tolerance, and  leading to decreased ability to independently complete ADL's.  Will benefit from ongoing OT with goal for MOD I with basic ADLs.     3. Impairment of mobility:  Noted to have impaired strength, impaired activity tolerance, and impaired balance leading to decreased mobility.  Will benefit from ongoing PT with goal for SEYMOUR with basic mobility.     4. Impairment of cognition/language/swallow:  Noted to have impaired speech with trach in place and impaired swallow will benefit from ongoing SLP.     5. Medical Conditions-  Appreciate hospitalist, pulmonology and nephrology support.      Acute hypoxemic respiratory failure  Sepsis in setting of PJP pneumonia, possible secondary pneumonia  S/p tracheostomy placement (2/12/21)  Presented to outside hospital with acute hypoxemic respiratory failure secondary to PJP pneumonia.  She failed extubation trials x2, developed ARDS.  Required proning, inhaled epoprostenol, pressors. required tracheostomy: 2/12. completed steroid course  2/17. recieved Bactrim X 21 days. s/p  Bronchoscopy on 2/19 (cultures showed Staph epidermidis)  and requiring only  - continue trach dome at bedtime & humidified nasal cannula during day-  Keep covered-  with PMSV (day) or HME ( Heat Moist Exchanger overnight)   - continue #6 cuffless shiley per IP   . continue- Albuterol QID, Mucomyst BID, Pulmicort BID   - CXR weekly (next  3/1)  - f/u outpatient repeat bronchoscopy  (~4/2)  -appreciate pulm support with trach weaning  - Had VFSS and is okay for regular diet with thin liquids     S/p bilateral lung transplant  03/2018  postoperatively complicated by right mainstem bronchial stenosis s/p several dilations, left  sided aspergillus empyema (10/2019), and CMV viremia (CMV now negative). EBV viremia  Immunosuppression: Tacrolimus via G tube  Goal 8-10 (level on 2-25-21 is 8.1)  Tacrolimus 2 mg q am/2.5 mg q pm   Prednisone 5 mg q am/2.5 mg po q PM (transitioned from burst steroids on 2-18-21)  Prophylaxis: Bactrim q M, W, F for PJP ppx  -CMV level q 2 weeks (VGCV for CMV ppx done through 2-25-21)  -EBV level q 4 weeks (due 3-22-21)      Aspergillus Empyema   -Continue posaconazole 300 mg TID, plan for indefinite course per ID. Levels per pulm recs (<0.2 on 2-25-21)  -f/u ID as outpatient     ESRD  Prior to hospitalization, HD biweekly per [facility] nephrology. Required CRRT 2/4 to 2/8.  Access is AVF with partial graft, challenging to access.     -appreciate ongoing nephrology support.     Acute on Chronic Anemia- Hgb 9.9 2/25 receiving EPO with dialysis  -trend    Leukopenia- WBC 2.9 2/25 felt to be medication related  -monitor     Atrial fibrillation with RVR    - occurring intermittently, seen and evaluated by EP   -follow up with EP outpatient with zio patch after discharge from [facility]  - Continue metoprolol, apixaban.      Steroid/ Tube feed induced Hyperglycemia:  Hgb A1C 6.0% 1/24/21  - Lantus 10 units daily   - sliding scale insulin   - Hypoglycemia protocol     Severe Malnutrition- in context of chronic illness.  s/p PEG-J tube 2/16/21  -continue TF via J  -taper TF as able  -appreciate nutrition consult  -continue renal diet.     anxiety: continue bedtime and as needed olanzapine, hydroxyzine prn    hx anisocoria: no deficits on exam, negative CTH. Monitor.    HTN: holding pta meds continues on metoprolol other  -monitor bp    6. Adjustment to disability:  Clinical psychology to eval and treat as indicated  7. FEN: renal +TF  8. Bowel: Last bowel movement 2-25-21 am.  Normal bowel movement.  9. Bladder: Voiding normally.  10. DVT Prophylaxis: apixaban  11. GI Prophylaxis: ppi  12. Code: FULL - discussed with patient on  [facility] admission on 2-25-21  13. Disposition: Home with family support  14. ELOS:  7-10 days.  15. Rehab prognosis:  Good  16. Follow up Appointments on Discharge: EP cardiology with Zio patch prior to discharge.  ID for Posaconazole management for aspergillus empyema and history of CMV and EBV viremia, Pulmonology for repeat bronch (4-2-21), Nephrology for continued dialysis.    Juana Sarabia, DO  Physical Medicine and Rehabilitation

## 2021-02-25 NOTE — PROGRESS NOTES
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 Nephrology consutled for dialysis needs.     Interval History :   Mrs Blue was seen on HD this am, will tx to ARU after this with next planned run on 2/27.  Has not gone 2 days without HD during this hospitalization so will plan to run a bit longer to get more UF and clearance in preparation for this.  On iron and EPO.       Assessment & Recommendations:   ESRD-Runs at [place] through [facility].  Has atypical HD schedule of Monday and Thursday, has AVF with partial graft which has been challenging to access per RN's.  Needed CRRT 2/5-2/8 due to hemodynamic instability but otherwise has run iHD.  Currently on TTS schedule.                    -HD today on TTS schedule, next planned run 2/27               -Has L arm AVF/graft, somewhat challenging access.                -Removed temp HD line on 2/9.        Volume status-Has much improved edema in abdomen, has likely lost muscle mass with prolonged hospitalization.  Will continue to try to pull 3L of UF with runs as long as BP's are stable, may try for longer run with next run to get more fluid before 2 days off of HD.       Electrolytes/pH-K 5.4, bicarb 29 prior to run today.      Ca/phos/pth-Ca 9.0, Mg and Phos mildly up last check.      Anemia-Hgb 9.9, last PRBC's 2/15.  Checked iron stores and sats 36% on 2/3, started EPO 4k with runs.       Nutrition-Nutren TF.       Seen and discussed with Dr Yeung     Recommendations were communicated to primary team via verbal communication.     ADRIÁN Lo CNS  Clinical Nurse  "Specialist  [phone]    Review of Systems:   I reviewed the following systems:  Gen: No fevers or chills  CV: No CP at rest  Resp: No SOB at rest  GI: No N/V    Physical Exam:   I/O last 3 completed shifts:  In: 1540 [P.O.:300; NG/GT:210]  Out: -    /67   Pulse 107   Temp 98.5  F (36.9  C) (Oral)   Resp 27   Ht 1.6 m (5' 2.99\")   Wt 56 kg (123 lb 7.3 oz)   LMP 06/07/2014 (Exact Date)   SpO2 100%   BMI 21.88 kg/m       GENERAL APPEARANCE: Trached but has speaking valve in place.    EYES: no scleral icterus  Endo: no moon facies  Pulmonary: Intubated, proned, equal expansion.    CV: regular rhythm, normal rate  GI: soft, non distended  MS: no evidence of inflammation in joints, no muscle tenderness  :  No Basilio  SKIN: warm, dry, trace edema.    NEURO: No focal deficits.     Labs:   All labs reviewed by me  Electrolytes/Renal -   Recent Labs   Lab Test 02/25/21  0542 02/24/21  0539 02/23/21  0513 02/22/21  0527 02/15/21  0418 02/15/21  0418 02/14/21  0336 02/13/21  0323   * 132* 132* 131*   < > 136 136 134   POTASSIUM 5.4* 4.3 5.3 4.7   < > 3.9 3.8 4.2   CHLORIDE 98 98 98 99   < > 100 100 100   CO2 29 28 28 27   < > 27 29 26   BUN 65* 35* 58* 38*   < > 52* 32* 52*   CR 3.25* 2.18* 3.43* 2.53*   < > 2.75* 1.84* 2.59*   * 140* 140* 136*   < > 155* 149* 142*   CHELSEY 9.0 9.1 9.2 8.8   < > 8.6 8.6 8.7   MAG  --  1.7  --  1.6  --  1.9 1.8 2.4*   PHOS  --   --   --   --   --  4.5 3.6 5.3*    < > = values in this interval not displayed.       CBC -   Recent Labs   Lab Test 02/25/21  0542 02/23/21  0513 02/22/21  0527   WBC 2.9* 3.3* 3.0*   HGB 9.9* 9.2* 9.7*    253 241       LFTs -   Recent Labs   Lab Test 02/25/21  0542 02/20/21  0503 02/09/21  1615   ALKPHOS 345* 244* 218*   BILITOTAL 0.4 0.4 0.4   ALT 41 34 32   AST 15 15 6   PROTTOTAL 6.6* 6.4* 6.1*   ALBUMIN 2.6* 2.4* 2.0*       Iron Panel -   Recent Labs   Lab Test 02/03/21  0415 12/13/18  1033 08/01/18  0921   IRON 51 16* 93 "   IRONSAT 36 7* 37   YOLA  --  302* 571*           Current Medications:    acetylcysteine  4 mL Nebulization BID     albuterol  2.5 mg Nebulization 4x daily     apixaban ANTICOAGULANT  2.5 mg Oral BID     B and C vitamin Complex with folic acid  5 mL Oral or Feeding Tube Daily     budesonide  0.5 mg Nebulization BID     epoetin alisha-epbx (RETACRIT) inj ESRD  4,000 Units Intravenous Once in dialysis     fiber modular (NUTRISOURCE FIBER)  1 packet Per Feeding Tube BID     gelatin absorbable  1 each Topical During Hemodialysis (from stock)     insulin aspart  1-12 Units Subcutaneous Q4H     insulin glargine  10 Units Subcutaneous QAM AC     metoprolol tartrate  50 mg Oral or Feeding Tube BID     OLANZapine  10 mg Oral or Feeding Tube Q24H     pantoprazole  40 mg Oral QAM AC     posaconazole  300 mg Per G Tube TID     predniSONE  2.5 mg Oral or Feeding Tube QPM     predniSONE  5 mg Oral or Feeding Tube QAM     protein modular  1 packet Per Feeding Tube Daily     sulfamethoxazole-trimethoprim  10 mL Oral or Feeding Tube Once per day on Tue Thu Sat     tacrolimus  2 mg Oral QAM    And     tacrolimus  2.5 mg Oral QPM       dextrose Stopped (02/16/21 1644)     sodium chloride 10 mL/hr at 02/08/21 0400     - MEDICATION INSTRUCTIONS -

## 2021-02-25 NOTE — DISCHARGE SUMMARY
[facility]  Hospitalist Discharge Summary      Date of Admission:  1/24/2021  Date of Discharge:  2/25/2021  Discharging Provider: Leelee Huang MD  Discharge Team: Hospitalist Service, Gold 10    Discharge Diagnoses   Acute hypoxemic respiratory failure  Sepsis in setting of PJP pneumonia, possible secondary pneumonia  S/p tracheostomy placement (2/12/21)  S/p bilateral lung transplant  L Aspergillus Empyema   ESRD  Atrial fibrillation with RVR    Hyperglycemia, steroid induced  Severe malnutrition in the context of acute on chronic illness    Follow-ups Needed After Discharge   Follow-up Appointments     Follow Up and recommended labs and tests      Follow up with EP Cardiology after discharge from ARU with plans for Zio   Patch monitor before appointment            - Repeat IP bronchoscopy in 6 weeks (~4/2)  - CMV level pending 2/25, follow [id number] initially after completion of VGCV ppx course  - Recheck EBV 3/22  - CXR weekly (next 3/1)    Unresulted Labs Ordered in the Past 30 Days of this Admission     Date and Time Order Name Status Description    2/25/2021 0542 CMV DNA quantification In process     2/25/2021 0000 Tacrolimus level In process     2/25/2021 0000 Posaconazole Level In process     2/19/2021 0901 Nocardia culture Preliminary     2/19/2021 0901 Fungus Culture, non-blood Preliminary     2/19/2021 0901 AFB Culture Non Blood Preliminary     1/25/2021 1341 AFB Culture Non Blood Preliminary     1/24/2021 1517 AFB Culture Non Blood - BAL site 1 Preliminary         Discharge Disposition   Discharged to ARU  Condition at discharge: Good    Hospital Course   Kecia is a 60 year old female who has past medical history of HTN, ESRD on dialysis, 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  Postoperative course was complicated by right mainstem bronchial stenosis requiring several dilations, left-sided aspergillus empyema in 2019, EBV  viremia, CMV viremia, and ESRD on HD Monday/Thursday.  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  Respiratory status worsened, and she was intubated and transferred to [facility] on 1/24.  She had decompensation to ARDS on 2/3.  Now s/p tracheostomy on 2/12 and PEG-J on 2/16.  Also s/p dilation of right main bronchus on 2/19.  Stable on trach dome after bronchoscopy, transferred to medicine floor 2/21.     Acute hypoxemic respiratory failure  Sepsis in setting of PJP pneumonia, possible secondary pneumonia  S/p tracheostomy placement (2/12/21)  Presented to outside hospital as mentioned above with acute hypoxemic respiratory failure secondary to PJP pneumonia.  She failed extubation trials x2, developed ARDS.  Required proning, inhaled epoprostenol, pressors.  Then required tracheostomy, which was placed on 2/12.  Also completed a course of steroids with a taper through 2/17 and course of Bactrim X 21 days.  Bronchoscopy on 2/19 and requiring only trach dome since that time.  2/22 #8 Shiley trach exchanged for #6 cuffless shiley per IP   -Cultures from bronchoscopy on 2/19 growing staph epi.   - Transition off continuous TD to humidified nasal cannula, with TD overnight. 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    -Continue Tacro 2 mg every  morning and 2.5 mg every evening.  Tacro level ordered for 2/25. Goal 8-20.     -Prednisone 5 mg every morning and 2.5 mg every evening    -Continues on Bactrim every Monday, Wednesday, Friday for PJP ppx    -Completed VGCV for CMV ppx     L Aspergillus Empyema   -Continue posaconazole, plan for indefinite course   - Next level 2/25, pending      ESRD  Prior to hospitalization, HD biweekly. Required CRRT 2/4 to 2/8.  Then transition to intermittent HD.  Access is AVF with partial graft, challenging to access.     -Orders per nephrology team.      -Receives Epogen with HD      Atrial fibrillation with RVR    - EP consulted, appreciate recommendations  - No need for cardioversion or other medical therapy at present as it is intermittent  - Plan for follow up with EP outpatient with zio patch after discharge from ARU  - Continue metoprolol, apixaban.      Hyperglycemia, steroid induced:  - Lantus 10 units daily   - sliding scale insulin   - Hypoglycemia protocol     Malnutrition:    - Level of malnutrition: Severe   - Based on: weight loss, moderate/severe subcutaneous fat loss, moderate/severe muscle loss      Additional medical concerns:    hx anxiety: bedtime and as needed olanzapine, hydroxyzine prn  hx anisocoria: no deficits on exam, negative CTH. Monitor.  hx HTN: holding pta meds  seronegative RA with Raynaud's: stable. Monitor.         Consultations This Hospital Stay   PHYSICAL THERAPY ADULT IP CONSULT  OCCUPATIONAL THERAPY ADULT IP CONSULT  NUTRITION SERVICES ADULT IP CONSULT  PHARMACY TO DOSE VANCO  NEPHROLOGY ICU IP CONSULT  PULMONARY CF/TRANSPLANT ADULT IP CONSULT  NUTRITION SERVICES ADULT IP CONSULT  PHARMACY IP CONSULT  NEPHROLOGY ESRD ADULT IP CONSULT  NUTRITION SERVICES ADULT IP CONSULT  SPEECH LANGUAGE PATH ADULT IP CONSULT  SPEECH LANGUAGE PATH ADULT IP CONSULT  INTERVENTIONAL RADIOLOGY ADULT/PEDS IP CONSULT  OPHTHALMOLOGY IP CONSULT  OPHTHALMOLOGY IP CONSULT  INTERVENTIONAL RADIOLOGY ADULT/PEDS IP  CONSULT  PHARMACY TO DOSE VANCO  VASCULAR ACCESS CARE ADULT IP CONSULT  CARE MANAGEMENT / SOCIAL WORK IP CONSULT  PHARMACY CRRT IP CONSULT  CARE MANAGEMENT / SOCIAL WORK IP CONSULT  SPIRITUAL HEALTH SERVICES IP CONSULT  SPIRITUAL HEALTH SERVICES IP CONSULT  PHARMACY CRRT IP CONSULT  INTERVENTIONAL RADIOLOGY ADULT/PEDS IP CONSULT  RESPIRATORY CARE IP CONSULT  VASCULAR ACCESS CARE ADULT IP CONSULT  SWALLOW EVAL SPEECH PATH AT BEDSIDE IP CONSULT  PASSY NICOLA VALVE WITH CUFF DEFLATION IP CONSULT  VASCULAR ACCESS CARE ADULT IP CONSULT  CARDIOLOGY ELECTROPHYSIOLOGY (EP) IP CONSULT  SPEECH LANGUAGE PATH ADULT IP CONSULT    Code Status   Full Code    Time Spent on this Encounter   I, Leelee Huang MD, personally saw the patient today and spent less than or equal to 30 minutes discharging this patient.       Leelee Huang MD  [facility] [address] [address]  Phone: [phone]  ______________________________________________________________________    Physical Exam   Vital Signs: Temp: 98  F (36.7  C) Temp src: Oral BP: 97/68 Pulse: 93   Resp: 18 SpO2: 100 % O2 Device: Nasal cannula with humidification Oxygen Delivery: 3 LPM  Weight: 123 lbs 7.32 oz    Constitutional: no apparent distress, pleasant and cooperative   Cardiovascular: regular rate and rhythm, normal S1 and S2, no murmurs, rubs, or gallops noted, no bilateral lower extremity edema   Respiratory: Tracheostomy in place, good air movement, no wheezing, rales, or rhonchi, normal work of breathing   GI: abdomen soft, non tender, non distended, bowel sounds present, PEG-J tube in place without erythema or drainage at site   Neuro: alert and oriented, no gross focal deficits noted   Extremity: No lower extremity edema           Primary Care Physician   Rosy Vu    Discharge Orders      Discharge Instructions    If questions or problems arise regarding tube function (e.g. leaking, dislodges, etc.) Contact  Interventional Radiology department 24 hours a day.    For procedures that were done at the [facility] sites,   8:00-4:30 PM Monday through Friday    Contact:[phone].    For afterhours and weekends call the [facility] main phone line [phone] and ask for the [facility] IR on call physician number.    If DIRECTED by the RADIOLOGIST, related to specific problems with the tube functioning,  go to the Emergency Department.     Discharge Instructions    Patient to make a follow up appointment in IR clinic in 10-14 days for the removal of the retention sutures at the G or GJ tube site. Phone number is [phone] if needed.     General info for SNF    Length of Stay Estimate: Short Term Care: Estimated # of Days <30  Condition at Discharge: Improving  Level of care:skilled   Rehabilitation Potential: Excellent  Admission H&P remains valid and up-to-date: Yes  Recent Chemotherapy: N/A  Use Nursing Home Standing Orders: Yes     Mantoux instructions    Give two-step Mantoux (PPD) Per Facility Policy Yes     Reason for your hospital stay    You were in the hospital for a severe pneumonia. You were initially admitted to the ICU and had a tracheostomy placed. You improved with treatment and were able to transition to the general medical floor. You were followed closely by the Pulmonology transplant team. You have done very well and are now ready to discharge from the hospital and go to acute rehab to regain your strength. You will continue to be followed by the Pulmonology team after discharge.     Activity - Up ad diego     Tracheostomy care by nursing    Standard Cares     Follow Up (Transitional Care Management)    Follow up with EP Cardiology after discharge from ARU with Zio patch prior to appointment    Follow up with Transplant Pulmonology     Appointments on [facility] and/or [facility] (with [facility] or [facility] provider or service). Call [phone] if you haven't heard regarding these appointments  within 7 days of discharge.     Follow Up and recommended labs and tests    Follow up with EP Cardiology after discharge from ARU with plans for Zio Patch monitor before appointment     Adult Formula Tube Feeding    Follow this regimen upon discharge: Orders Placed This Encounter      Adult Formula Drip Feeding: Continuous Nutren 1.5; Jejunostomy; Goal Rate: 70; mL/hr; From: 4:00 PM; 10:00 AM; Medication - Feeding Tube Flush Frequency: At least 15-30 mL water before and after medication administration and with tube clogging; Am...     Advance Diet as Tolerated    Follow this diet upon discharge:  Snacks/Supplements Adult: Other; ensure enlive; Between Meals      Renal Diet (non-dialysis)       Significant Results and Procedures   Most Recent 3 CBC's:  Recent Labs   Lab Test 02/25/21  0542 02/23/21  0513 02/22/21  0527   WBC 2.9* 3.3* 3.0*   HGB 9.9* 9.2* 9.7*   * 101* 99    253 241     Most Recent 3 BMP's:  Recent Labs   Lab Test 02/25/21  0542 02/24/21  0539 02/23/21  0513   * 132* 132*   POTASSIUM 5.4* 4.3 5.3   CHLORIDE 98 98 98   CO2 29 28 28   BUN 65* 35* 58*   CR 3.25* 2.18* 3.43*   ANIONGAP 5 6 6   CHELSEY 9.0 9.1 9.2   * 140* 140*     Most Recent 2 LFT's:  Recent Labs   Lab Test 02/25/21  0542 02/20/21  0503   AST 15 15   ALT 41 34   ALKPHOS 345* 244*   BILITOTAL 0.4 0.4   ,   Results for orders placed or performed during the hospital encounter of 01/24/21   XR Chest Port 1 View    Narrative    EXAM: XR CHEST PORT 1 VW  1/24/2021 3:08 PM     HISTORY:  s/p b/l lung transplant w/new HRF, also confirm line/tube  placement       COMPARISON:  Chest x-ray 12/9/2020    FINDINGS:   Portable supine view of the chest. Post surgical changes of bilateral  lung transplant with mediastinal surgical clips and intact medial  sternotomy wires. Endotracheal tube tip projects over the low thoracic  trachea. Right internal jugular central venous catheter tip projects  at the superior cavoatrial junction.  Enteric tube tip and sidehole  projected within the stomach.    The trachea is midline. Cardiac silhouette is within normal limits.  There patchy airspace opacities bilaterally. Small right pleural  effusion with overlying basilar opacities. No pneumothorax.      Impression    IMPRESSION:   1. Endotracheal tube tip projects over the low thoracic trachea.  2. Right internal jugular central venous catheter tip projects over  the superior cavoatrial junction.  2. Enteric tube tip and sidehole projected within the stomach.  4. Patchy airspace opacities bilaterally, suggestive of edema versus  infection.  5. Small right pleural effusion.    I have personally reviewed the examination and initial interpretation  and I agree with the findings.    MEHNAZ CHAPMAN MD   XR Chest Port 1 View    Narrative    Portable chest    INDICATION: Acute hypoxemic respiratory failure    COMPARISON: 1/24/2021    FINDINGS: Heart size remains normal. Right IJ catheter tip appears in  the region of the SVC/right atrial junction. There is some coiling at  the superior aspect of the tubing, this could be outside of the  patient, please correlate clinically to ensure that this is not coiled  within a vascular structure. No pneumothorax. Median sternotomy again  present. Opacities in the lungs appears similar along with blunting of  right costophrenic angle.  Endotracheal tube tip there is approximately 2.3 cm above the gee.  Surgical clips in the right axillary region are present. NG/OG tube  tip and sidehole projected within the stomach.      Impression    IMPRESSION: Continued opacification in the lungs which may represent  infection or edema. No significant change. Unchanged small right  pleural effusion.    TERRI ISSA MD   XR Chest Port 1 View    Narrative    EXAM: XR CHEST PORT 1 VW  1/25/2021 6:41 PM     HISTORY:  post left thoracentesis       COMPARISON:  Chest x-ray from same date at 0812 hours    FINDINGS:   Portable  upright view of the chest. Endotracheal tube tip projects  over the low thoracic trachea. Right internal jugular central venous  catheter tip projects over the distal SVC, with coiling of the  proximal tubing, unchanged from prior. Scattered mediastinal surgical  clips. Trachea is midline. Cardiac silhouette is stable. No  significant change in the diffuse mixed interstitial and airspace  opacities bilaterally. Small bilateral pleural effusions. No  pneumothorax. Intact median sternotomy wires.      Impression    IMPRESSION:    1. No significant change in the diffuse mixed interstitial and  airspace opacities.  2. Small bilateral pleural effusions.  3. Unchanged coiling of the proximal right internal jugular central  venous catheter. Recommend correlation to ensure that the coiling is  external to the patient.    I have personally reviewed the examination and initial interpretation  and I agree with the findings.    TALI CARRANZA MD   XR Abdomen Port 1 View    Narrative    Exam: XR ABDOMEN PORT 1 VW, 1/26/2021 1:01 PM    Indication: NJ    Comparison: 10/24/2019    Findings:   Supine AP views of the abdomen were obtained. The enteric tube is  coiled in the distal stomach with the tip positioned over the fundus.  Partially imaged central venous catheter tip projects over the high  right atrium. Paucity small bowel gas without air-filled dilated loops  of bowel, pneumatosis, or portal venous gas. Bibasilar opacities,  better demonstrated on recent chest radiograph. Degenerative changes  in the lumbar spine. No acute osseous abnormalities. Soft tissue edema  along the flanks.      Impression    Impression:   The enteric tube tip projects over the gastric fundus. Recommend  repositioning if postpyloric position is desired.     NEAL SANTACRUZ MD   XR Abdomen Port 1 View    Narrative    EXAM: XR ABDOMEN PORT 1 VW  1/26/2021 4:16 PM     HISTORY:  NJ repositioned       COMPARISON:  Abdominal radiograph from same  date    FINDINGS:   Portable semiupright view of the abdomen. The feeding tube has been  repositioned with tip projecting at the expected location of the  gastric antrum. Nonobstructive bowel gas pattern. Partially visualized  median sternotomy wires. Bibasilar opacities. Osseous structures are  within normal limits.      Impression    IMPRESSION:   Feeding tube tip now projects over the gastric antrum.     I have personally reviewed the examination and initial interpretation  and I agree with the findings.    ANNE MANZANO MD   XR Chest Port 1 View    Narrative    Exam: XR CHEST PORT 1 VW, 1/27/2021 1:54 AM    Indication: increased oxygen requirements    Comparison: 1/25/2021 chest x-ray    Findings:   Semiupright AP view of the chest. Right IJ venous catheter tip  terminates over the cavoatrial junction with loop projecting over the  lower cervical soft tissues. Enteric tube termination projects  inferior to the field-of-view. Median sternotomy wires are intact.  Postsurgical changes of bilateral lung transplant.    The trachea is midline. Cardiac silhouette is not well-visualized.  Diffuse, mixed interstitial and airspace opacities have increased in  degree, most prominently in the right upper lobe and left midlung. No  significant pleural effusion. No pneumothorax.      Impression    Impression:   1. Increased mixed interstitial and airspace opacities, most notably  in the right upper lobe and left midlung.  2. Support devices as described in body of report.    I have personally reviewed the examination and initial interpretation  and I agree with the findings.    SUNITA ADKINS MD   XR Chest Port 1 View    Narrative    Exam: XR CHEST PORT 1 VW, 1/27/2021 4:34 AM    Indication: s/p intubation    Comparison: 1/27/2021 chest x-ray at 01:34    Findings:   Semiupright AP view of the chest. Endotracheal tube termination  projects 3.2 cm proximal to the gee. Right IJ venous catheter  projects over the cavoatrial  junction. Median sternotomy wires are  intact. Enteric tube courses inferior to the field of view.    Trachea is midline. Cardiac silhouette is normal size. Stable  postsurgical changes of bilateral lung transplant. Stable, diffuse  patchy mixed interstitial and airspace opacities. No significant  pleural effusion. No pneumothorax.      Impression    Impression:   1. Endotracheal tube terminates 3.2 cm proximal to the gee.  Remaining support devices are unchanged in position.  2. Stable diffuse, patchy mixed interstitial and airspace opacities    I have personally reviewed the examination and initial interpretation  and I agree with the findings.    SUNITA ADKINS MD   CT Chest w/o Contrast    Narrative    Chest CT without contrast    INDICATION: Pneumonia, effusion or abscess suspected    COMPARISON: Outside chest CT 1/23/2021    FINDINGS: Diffuse patchy opacities in the lungs with groundglass  opacities are overall quite similar to slightly decreased in  appearance but still dominant. Lower lobe consolidative opacities are  actually increased.  The included thyroid appears normal. Median sternotomy. Rim  hyperdense/calcified possible abscess (series 2 image 33) measures  approximately 5.6 x 5.4 cm in the axial plane which is similar to  previous. Small right and left other pleural effusions noted.  Upper abdomen shows gallstones along with enteric tube progressing at  least into the stomach. No suspicious retrosternal fluid collections.  Mild body wall edema noted. No adrenal masses. No enlarged axillary,  mediastinal or hilar lymph nodes. Heart is overall slightly enlarged.  No pericardial effusion. The main pulmonary artery is normal in size,  as is the thoracic aorta at upper limits of normal in size. Small  amount of perihepatic ascites. Bones show degenerative disc changes in  the thoracic spine. No suspicious sclerotic or lytic/destructive  lesion.      Impression    IMPRESSION: No significant rim  calcified fluid density in left lower  chest which may represent abscess. Other small bilateral pleural  effusions. Diffuse patchy consolidations in both lungs, slightly  decreased in the upper lobes and slightly increased in the lower  lobes. Minimal amount of perihepatic ascites. Cholelithiasis. Mild  thoracic spondylosis.    TERRI ISSA MD   XR Feeding Tube Placement    Narrative    Feeding tube placement.    Comparison: Abdominal x-ray dated 1/26/2021.    History: Feeding tube needed for nutrition. Unsuccessful attempt to  place feeding tube at bedside using Enteral access system (Cortrak)    Fluoroscopy time:  13.3 minute[s]    Technique: After injection of lidocaine gel into the right nostril,  attempted to advance previously placed cortrak feeding tube under  fluoroscopic guidance. Despite numerous attempts with multiple  guidewires, this was unsuccessful. Cortrak feeding tube was removed  and a new feeding tube was placed and advanced under fluoroscopic  guidance.    Findings: The feeding tube is advanced as far as possible. Despite  numerous attempts, the tip was unable to be advanced beyond the  pyloric sphincter. Feeding tube positioned with tip near the pylorus,  likely abutting the pyloric sphincter. A small amount of barium,  water, and air was injected to define anatomy. Guidewire was removed  and placed with plastic bag attached to patient whiteboard. Feeding  tube secured with nasal bridle and flushed with water.      Impression    Impression: Feeding tube placement with tip advanced as far as  possible; positioned with tip abutting the pyloric sphincter within  the gastric lumen. Due to upper limits of recommended fluoroscopy  time, further attempts at placement deferred until following day.  Feeding tube was secured in place with additional length of tube  within the gastric lumen in anticipation of duodenal advancement  secondary to peristalsis. May consider obtaining follow-up  abdominal  x-ray in a.m. to reevaluate position.    I have personally reviewed the examination and initial interpretation  and I agree with the findings.    ANA CRISOSTOMO, DO   XR Feeding Tube Reposition    Narrative    Fluoroscopic guided feeding tube placement    INDICATION: NG tube advanced with fluoroscopy, could not get tube past  the pylorus sphincter on 1/27    COMPARISON: 1/27/2021    FINDINGS: 40.4 seconds fluoroscopy time were utilized. Feeding tube  was in the stomach but postpyloric placement was requested. Wires  manipulated and the feeding tube advanced into the distal duodenum. No  acute complication. Tube was secured by nasal bridle.    Dr. Issa, faculty, was also present in the room during the  entire examination.      Impression    IMPRESSION: Successful feeding tube postpyloric placement into the  distal most duodenum.    TERRI ISSA MD   XR Feeding Tube Placement    Narrative    Feeding tube placement, ICU bedside, 1/29/2021.    Comparison: Fluoroscopic imaging for feeding tube placement dated  1/28/2021.    History: Feeding tube needed for nutrition. NJT not functioning    Fluoroscopy time:  5.45 minute[s]    Technique: After injection of lidocaine gel into the right nostril,  the previously placed feeding tube in the right nostril was assessed,  found to be looped in the stomach, unlooped under fluoroscopy, found  to be nonfunctional, withdrawn and a new feeding tube was placed in  the left nostril following injection of lidocaine gel and advanced  under fluoroscopic guidance.    Findings: Inspection of the withdrawn feeding tube demonstrates  coagulated tube feeding formula within the side ports. The new feeding  tube is advanced with the tip in the expected location of the distal  (fourth) portion of the duodenum. A small amount of barium, water, and  air was injected to define anatomy. The guidewire was removed and  placed in package on patient white board. Feeding tube was  flushed  with water and secured to previously placed nasal bridle.      Impression    Impression: Uncomplicated feeding tube replacement with tip in the  expected location of the distal (fourth) portion of the duodenum.  Recommend attention to water flushes after tube feeds to prevent  recurrent clogging.    I, NITHYA GUIDO MD, attest that I was immediately available to  provide guidance and assistance during the entirety of the procedure.  Radiologist was not present directly during tube placement as this was  performed portable in the ICU.    I have personally reviewed the examination and initial interpretation  and I agree with the findings.    NITHYA GUIDO MD   XR Chest Port 1 View    Narrative    Exam: XR CHEST PORT 1 VW, 1/29/2021 5:33 PM    Indication: worsening respiratory distress & OGT placement    Comparison: Chest CT 1/27/2020, chest x-ray 1/27/2020    Findings:   Semiupright AP view of the chest. Right IJ central venous catheter tip  is overlying the cavoatrial junction. Enteric tube passes below the  diaphragm with tip located in the region of the stomach. Enteric  contrast is seen overlying the stomach. Post surgical changes of the  chest with median sternotomy wires and multiple surgical clips  overlying the cardiac mediastinal silhouette. Extensive bilateral  pulmonary airspace opacities, predominantly in the lower lungs.  Bilateral dense retrocardiac opacities. Cardiac silhouette is  obscured. No definite pneumothorax. Right loculated pleural effusion.  Probable small left pleural effusion.      Impression    Impression:   1. The endotracheal tube has been removed. Right IJ central venous  catheter is in stable position. There is an  enteric tube which  terminates in the region of the stomach.  2. Extensive bilateral pulmonary airspace opacities and increasing  consolidation at the lung bases.    I have personally reviewed the examination and initial interpretation  and I agree with the  findings.    MEHNAZ CHAPMAN MD   CT Head w/o Contrast    Narrative    CT HEAD W/O CONTRAST 1/30/2021 2:43 PM    Provided History: Neuro deficit, acute, stroke suspected; fixed  dilated R pupil  ICD-10:    Comparison: None.    Technique: Using multidetector thin collimation helical acquisition  technique, axial, coronal and sagittal CT images from the skull base  to the vertex were obtained without intravenous contrast.     Findings:    No intracranial hemorrhage, mass effect, or midline shift. The  ventricles are proportionate to the cerebral sulci. The gray to white  matter differentiation of the cerebral hemispheres is preserved. The  basal cisterns are patent. Mild periventricular white matter  hypodensity consistent with chronic small vessel ischemic disease.    Mild high right frontoparietal dural calcification/ossification. The  visualized paranasal sinuses are clear. Mild dependent bilateral  mastoid effusions.       Impression    Impression: No acute intracranial pathology.    I have personally reviewed the examination and initial interpretation  and I agree with the findings.    NEAL LOPES MD   XR Chest Port 1 View    Narrative    Exam: XR CHEST PORT 1 VW, 2/1/2021 11:30 AM    Indication: dyspnea, s/p lung transplant    Comparison: 1/29/2021    Findings:   Semiupright AP view of the chest. Patient is rotated to the right.  Posterior changes of bilateral lung transplantation with mediastinal  clips and intact sternotomy wires. Right IJ central line projecting  over the low SVC. Enteric tube coursing below the left hemidiaphragm  with tip projecting over the region of the stomach. Enteric contrast  is no longer visualized.  Trachea is midline. Cardiac silhouette is obscured. Redemonstration of  extensive bilateral mixed interstitial and airspace opacities, with  lower lobe predominance. Unchanged aeration of the bilateral upper  lungs. Bilateral dense retrocardiac and basilar opacities.  No  appreciable pneumothorax. Probable small bilateral pleural effusions.       Impression    Impression:   1. Right IJ central venous catheter and enteric tube are stable in  position.  2. Relatively unchanged extensive bilateral mixed interstitial and  airspace opacities with lower lung predominance. Probable superimposed  bilateral small pleural effusions.    I have personally reviewed the examination and initial interpretation  and I agree with the findings.    ANA CRISOSTOMO, DO   XR Feeding Tube Placement    Narrative    Feeding tube placement, 2/2/2021.    Comparison: none.    History: Feeding tube needed for nutrition.     Fluoroscopy time:  1.6 minute[s]    Technique: After injection of lidocaine gel into the left nostril,  previously placed feeding tube was advanced under fluoroscopic  guidance. Guidewire was reinserted under fluoroscopy. Patient was  given single dose of 10 mg IV Reglan, previously ordered as PRN  medication from primary team for feeding tube placement.    Findings: The feeding tube is advanced with the tip in the expected  location of the distal (fourth) portion of the duodenum. A small  amount of barium and water was injected to define anatomy. Guidewire  was retracted stored on patient white board. Feeding tube was flushed  thoroughly with water. Feeding tube was secured with previously placed  nasal bridle.      Impression    Impression: Uncomplicated feeding tube placement with tip in the  expected location of the distal (fourth) portion of the duodenum.    I, SUNITA ADKINS MD, attest that I was immediately available to  provide guidance and assistance during the entirety of the procedure.  The examination was performed portable in the ICU therefore a  radiologist was not directly present during tube placement.    I have personally reviewed the examination and initial interpretation  and I agree with the findings.    SUNITA ADKINS MD   XR Chest Port 1 View    Narrative    EXAM: XR  CHEST PORT 1 VW  2/3/2021 1:30 AM     HISTORY:  worsening hypoxic respiratory failure       COMPARISON:  Chest x-ray 2/1/2021.    FINDINGS:   Portable upright radiograph of the chest. Right internal jugular  central venous catheter projects over the superior cavoatrial  junction. Feeding tube courses below the diaphragm and into the  stomach with the tip collimated out of the field of view. Postsurgical  changes of bilateral lung transplant with mediastinal clips and intact  median sternotomy wires. Trachea is midline. Cardiac silhouette is  largely obscured. No significant change in the diffuse mixed  interstitial and airspace opacities, most pronounced in the bilateral  lower lobes. Likely bilateral pleural effusions. No pneumothorax.    There is some contrast noted within the colon in the upper abdomen,  presumably from feeding tube placement 2/2/2021.      Impression    IMPRESSION:   1. Unchanged diffuse bilateral interstitial and airspace opacities,  with lower lung predominance.  2. Probable bilateral pleural effusions.    I have personally reviewed the examination and initial interpretation  and I agree with the findings.    NITHYA GUIDO MD   XR Chest Port 1 View    Narrative    EXAM: XR CHEST PORT 1 VW  2/3/2021 2:14 AM     HISTORY:  s/p intubation       COMPARISON:  Chest x-ray from same date at 0124 hours    FINDINGS:   Portable supine radiograph of the chest. Endotracheal tube tip  projects over the low thoracic trachea approximately 2.4 cm above the  level of the gee. Right internal jugular central venous catheter  projects over the superior cavoatrial junction. Feeding tube courses  below the diaphragm and into the stomach with the tip collimated out  of the field of view. Postsurgical changes of bilateral lung  transplant with mediastinal clips and intact median sternotomy wires.  Trachea is midline. Cardiac silhouette is largely obscured. No  significant change in the diffuse mixed  interstitial and airspace  opacities, most pronounced in the bilateral lower lobes. Likely  bilateral pleural effusions. No pneumothorax.      Impression    IMPRESSION:   1. Endotracheal tube tip projects over the low thoracic trachea,  approximately 2.4 cm above the level of the gee.  2. Unchanged diffuse mixed interstitial and airspace opacities.    I have personally reviewed the examination and initial interpretation  and I agree with the findings.    NITHYA GUIDO MD   XR Abdomen Port 1 View    Narrative    EXAM: XR ABDOMEN PORT 1 VW  2/3/2021 2:30 AM     HISTORY:  s/p intubation for placement of NJ       COMPARISON:  Abdominal radiograph 1/26/2021    FINDINGS:   Portable semiupright view of the abdomen. Feeding tube tip projects at  the expected location of the duodenal jejunal junction. Partial  contrast opacification of loops of bowel in the abdomen from prior  feeding tube placement on 2/2/2021. Nonobstructive bowel gas pattern.  No evidence of pneumatosis. Diffuse interstitial opacities in the  visualized lung bases.    Pression:  1. Feeding tube tip projects over the duodenal jejunal junction.  2. Nonobstructive bowel gas pattern.    I have personally reviewed the examination and initial interpretation  and I agree with the findings.    NITHYA GUIDO MD   CT Chest w/o Contrast    Narrative    EXAMINATION: Chest CT  2/4/2021 3:49 AM    CLINICAL HISTORY: Pneumonia, effusion or abscess suspected.    COMPARISON: CT chest 1/27/2021. CT chest 12/9/2020.    TECHNIQUE: CT imaging obtained through the chest without contrast.  Axial, coronal, and sagittal reconstructions and axial MIP reformatted  images are reviewed.     CONTRAST: None    FINDINGS:  Lines and tubes: Endotracheal tube tip terminates in the low thoracic  trachea. Right internal jugular Central venous catheter tip terminates  in the distal SVC. Enteric tube courses into the stomach.    Lungs: Postsurgical changes of bilateral lung  transplant. Fluid-filled  upper trachea. Central tracheobronchial tree is otherwise patent.  Trace bilateral pleural effusions. Diffuse groundglass opacification  with interlobular septal thickening throughout the bilateral lung  fields with large confluent areas of consolidation, most pronounced in  the bilateral lower lobes. Overall the degree of consolidation has  increased from prior exam on 1/27/2021. No significant change in the  peripherally calcified chronic empyema in the left lung base.  Intrapleural amphotericin bead noted in the left lung base.    Mediastinum: The visualized thyroid is unremarkable. Cardiac size is  enlarged. No significant pericardial effusion. Normal caliber of the  aorta and main pulmonary artery. Normal branching pattern of the  aortic arch. No significant coronary artery calcium. Mild  atherosclerotic changes of the thoracic aorta. Scattered prominent  mediastinal lymph nodes, not enlarged by size criteria. Esophagus is  normal in caliber.    Bones and soft tissues: Degenerative changes of the spine. Stable  anterior wedge compression deformity of the T5 vertebral body. Old  right sixth rib fracture. Intact median sternotomy wires. No  suspicious osseous lesions. Mild diffuse anasarca.    Upper Abdomen: Limited evaluation of the upper abdomen is within  normal limits.      Impression    IMPRESSION:   1.  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 Overall the degree  of consolidation has progressed compared to the prior CT on 1/27/2021.  Cannot exclude superimposed infection.  2. No significant change in the chronic empyema in the left lower lobe  status post intrapleural amphotericin B placement.    I have personally reviewed the examination and initial interpretation  and I agree with the findings.    MEHNAZ CHAPMAN MD   XR Chest Port 1 View    Narrative    EXAM: XR CHEST PORT 1 VW  2/9/2021 9:24 AM      HISTORY:  worsening hypoxia       COMPARISON:  CT chest to 4/20/2021    FINDINGS:   Semiupright portable AP view of the chest. ET tube tip projects over  the thoracic trachea. Right IJ central venous catheter tip projects  over the superior cavoatrial junction, stable. Feeding tube tip  terminates out of the field of view postsurgical changes at bilateral  lung transplant with mediastinal sternotomy wires intact. Trachea is  midline. Cardiac silhouette is largely obscured. No significant change  in diffuse mixed interstitial and airspace opacities, more pronounced  in the bilateral lower lobes. Likely bilateral pleural effusions. No  appreciated pneumothorax.      Impression    IMPRESSION:     1. No significant change in diffuse mixed interstitial and airspace  opacities and likely bilateral pleural effusions, findings compatible  with ARDS.  2. Stable position of support devices.    I have personally reviewed the examination and initial interpretation  and I agree with the findings.    ADAM GONZALEZ MD   CT Chest/Abdomen/Pelvis w Contrast    Narrative    EXAMINATION: CT CHEST/ABDOMEN/PELVIS W CONTRAST, 2/9/2021 3:51 PM    TECHNIQUE:  Helical CT images from the thoracic inlet through the  symphysis pubis were obtained  with contrast.     Contrast dose: 74 mL of Isovue 370    COMPARISON: 2/4/2021 CT chest 1/27/2021 CT chest    HISTORY: Abdominal abscess/infection suspected    FINDINGS:    Chest: The endotracheal tube terminates 3.2 cm proximal to the gee.  Postsurgical changes of bilateral lung transplant with mild right  anastomotic stenosis and no discernible left-sided anastomotic  stenosis. No evidence a bronchial dehiscence. The central  tracheobronchial tree is patent. Diffuse groundglass opacities with  superimposed irregular interlobular septal thickening and  consolidative opacities in the dependent aspects of the lower lobes,  right greater than left, are markedly improved in their aeration.  No  significant pleural effusion or pneumothorax. Stable size of  left-sided, well-circumscribed, arch layering calcified fluid  attenuating lesion abutting the medial and dependent pleura within the  left lower lobe. Heart size is normal. Aortic root and proximal  pulmonary artery normal caliber. Normal 3 vessel arch anatomy.  Visualized thyroid is normal. No axillary or mediastinal adenopathy.  Median sternotomy wires are intact.    Abdomen and pelvis: Enteric tube terminates in the proximal jejunum.  Subcentimeter, focal hypoattenuating lesion in the posterior aspect of  the right hepatic lobe it is too small to characterize (series 8,  image 313), otherwise there are no focal hepatic lesions.  Cholelithiasis with mild gallbladder mucosal enhancement and trace  pericholecystic fluid. Mild splenomegaly. The stomach/duodenum,  pancreas, adrenal glands and renal parenchyma are normal. No  nephrolithiasis or hydronephrosis. Mild degree of intramesenteric  edema most prominently adjacent to the pancreatic root end tracking  along the right pararenal fascia. Small and large bowel are normal  caliber. No small or large bowel wall thickening. The appendix is  normal. Small volume free fluid in the dependent pelvis. No abnormal  pelvic masses. Hyperenhancing 1.3 cm subserosal fibroid arising off  the anterior uterine wall. No inguinal, pelvic or retroperitoneal  adenopathy. Mild degree of atherosclerotic calcifications of the  infrarenal abdominal aorta.    Bones and soft tissues: Mild degree of soft tissue anasarca overlying  the anterior abdominal wall, proximal thighs and gluteal musculature.  Stable compression deformities of the T5 and T9 vertebral bodies. No  suspicious osseous lesions otherwise.      Impression    IMPRESSION:   1. No evidence of intraabdominal abscess or infection.   2. Slightly improved aeration of the diffuse groundglass opacities,  irregular interlobular septal thickening and consolidative  opacities  in the lower lobes.   3. Stable size of partially calcified cystic structure abutting the  medial pleura in the left lower lobe, consistent with patient's known  chronic empyema.  4. Cholelithiasis with mild gallbladder mucosal enhancement and trace  pericholecystic fluid, likely due to anasarca/third spacing rather  than acute cholecystitis.  5. Mild degree of intramesenteric edema, soft tissue anasarca and  dependent pelvic fluid, likely due to third spacing.  6. Chronic appearing compression deformities of the T5 and T9  vertebral bodies. No suspicious osseous lesions.  7. Support devices as described in the report.    I have personally reviewed the examination and initial interpretation  and I agree with the findings.    JAMES LAI MD   IR Gastro Jejunostomy Tube Placement    Narrative    PROCEDURES 264 and 21:  1. Gastrojejunostomy tube placement under fluoroscopic guidance.    Clinical History: 60-year-old female with ILD status post lung  transplant requiring feeding tube for nutrition.    Comparisons: CT abdomen pelvis 2/9/2021.    Staff Radiologist: Shree Ayers MD    Fellow(s): Meet Rich M.D.    Medications: The patient was placed on continuous monitoring.  Intravenous sedation was administered. Patient received 50 mg of  fentanyl and 1 mg of Versed. Vital signs and sedation monitored by  nursing staff under Interventional Radiologist's supervision. The  patient remained stable throughout the procedure.    Sedation time: 25 minutes face-to-face    Fluoroscopy time: 12.1 minutes.    Dose: 72.3 mGy    PROCEDURE: The patient understood the limitations, alternatives, and  risks of the procedure and requested the procedure be performed. Both  written and oral consent were obtained.    Pre-procedural ultrasound performed to delineate the liver margins.  Indwelling nasojejunal tube was pulled back into the stomach under  fluoroscopic guidance.    The left upper quadrant was prepped  and draped in the usual sterile  fashion. 1% lidocaine without epinephrine was used for local  anesthesia.     Glucagon administered. Stomach inflated with air through the  nasogastric tube. Under fluoroscopic guidance, gastropexy was made  with 2 [employer] Saf-T-Pex[employer]* T-fasteners.    Needle gastrostomy made under fluoroscopic guidance with the 18 gauge  Safety Introducer needle. Needle removed over guidewire. Proximal  jejunum catheterized with the 5 Swiss RAFAEL 1 catheter over guidewire  under fluoroscopic guidance. The guidewire was exchanged for a stiff  angled Glidewire. Catheter exchanged over Glidewire for the 22 Swiss  introducer dilator/peel-away sheath. Dilator exchanged over guidewire  for the 18 Swiss 45 cm [employer] JAYME Transgastric-Jejunal  feeding tube through the peel-away sheath. Guidewire and peel-away  sheath removed. Tube balloon inflated and tube secured. Adequate  placement of gastric and jejunal ports documented with contrast.     Fluoroscopic image documenting tube placement was saved in the  patient's record.     Ports flushed with saline. Sterile dressing applied. Gastric port  placed to gravity drainage. Nasogastric tube removed. No immediate  complication.       Impression    IMPRESSION:   1. 18 Swiss 45 cm [employer] JAYME Transgastric-Jejunal  gastrojejunostomy tube placed under fluoroscopic guidance. Gastric  port to gravity drainage.    PLAN:  Bedrest for 2 hours.    Procedure performed by Dr. Rich under my supervision.  I, Dr. Yamini Ayers, was present for the entire procedure.    I have personally reviewed the examination and initial interpretation  and I agree with the findings.    YAMINI AYERS MD   XR Chest Port 1 View    Narrative    EXAM: XR CHEST PORT 1 VW  2/12/2021 11:17 AM     HISTORY:  post trach placement & line placement       COMPARISON:  Chest x-ray and CT 2/9/2021    FINDINGS:   AP portable view of the chest. Tracheostomy tube tip  projects in the  high thoracic trachea. Right IJ central venous catheter tip projects  in the superior cavoatrial junction. Enteric tube courses below the  diaphragm with tip outside the field-of-view.. Postoperative changes  with intact median sternotomy wires and mediastinal surgical clips.  Bowel clips projecting over the right axilla. Demonstration of diffuse  patchy pulmonary opacities, most pronounced at the lung bases.      Impression    IMPRESSION:   1. Tracheostomy tube tip projects in the thoracic trachea. Additional  support devices are stable.  2. No definite change in the diffuse mixed interstitial and airspace  opacities with likely bilateral pleural effusions.    I have personally reviewed the examination and initial interpretation  and I agree with the findings.    ADAM GONZALEZ MD   XR Chest Port 1 View    Narrative    EXAM: XR CHEST PORT 1 VW  2/18/2021 5:50 AM     HISTORY:  S/p Trach and acute resp failure. H/o Álvaro sltx. PJP pna.       COMPARISON:  2/12/2021    FINDINGS:   AP portable view of the chest. Postoperative changes of bilateral lung  transplantation with intact median sternotomy wires and mediastinal  surgical clips. Surgical clips project over the right axilla.  Tracheostomy tube tip projects in the mid thoracic trachea. Low lung  volumes. No appreciable pneumothorax. Suspected small right pleural  effusion. Midline trachea. Stable enlargement of the cardiac  silhouette.      Impression    IMPRESSION:   1. Interval removal of a right IJ central venous catheter and enteric  tube.  2. No definite change in the diffuse mixed interstitial and airspace  opacities with suspected right pleural effusion.    I have personally reviewed the examination and initial interpretation  and I agree with the findings.    MEHNAZ CHAPMAN MD   XR Chest Port 1 View    Narrative    EXAM: XR CHEST PORT 1 VW  2/22/2021 11:53 AM     HISTORY:  Interval f/u       COMPARISON:  Chest x-ray  2/18/2021    FINDINGS:   Semiupright portable AP view of the chest. Postoperative changes of  bilateral lung transplant with median sternotomy wires intact.  Tracheostomy tube with tip projecting in the mid thoracic trachea. Low  inspiratory volumes. Cardiac silhouette is largely obscured.    No significant change in diffuse mixed interstitial and airspace  opacities. Unchanged right basilar opacity.      Impression    IMPRESSION:   No significant change to diffuse mixed interstitial and airspace  opacities or right basilar atelectasis with probable effusion.    I have personally reviewed the examination and initial interpretation  and I agree with the findings.    ADAM GONZALEZ MD   XR Video Swallow with SLP or OT    Narrative    Examination:  Modified Barium Swallow Study with Speech Pathology,  performed 2/24/2021    Comparison: None.    History: Recent tracheostomy to 2/12/2021    Fluoroscopy time: 1.7 minutes.    Findings: Under fluoroscopic guidance, the patient was given orally  administered barium of varying consistencies in the presence of the  speech pathology service.  The oral phase was normal. Consistent delay  of swallow initiating at the piriform. There is flash penetration  without aspiration of thin liquids. No evidence for aspiration with  nectar thickened barium, pudding consistency, or barium coated  cracker. Mild pharyngeal residue.  Other findings: Degenerative changes of the cervical spine.      Impression    Impression:   1. Flash penetration without aspiration of thin liquids.   2. Consistent delay of swallow initiation.    Please see the speech pathologist report for further details regarding  the modified barium swallow study portion of the examination.    I, SUNITA ADKINS MD, attest that I was present in the procedure room  for the entire procedure.    I have personally reviewed the examination and initial interpretation  and I agree with the findings.    SUNITA ADKINS MD   Echo  Complete    Narrative    [id number]  [id number]  [id number]  [id number]^ISABELLA^CHRISTINE           [facility],[place]  Echocardiography Laboratory  [address] [place] [address]     Name: SARAI KILLIAN  MRN: [id number]  : 1960  Study Date: 2021 01:55 PM  Age: 60 yrs  Gender: Female  Patient Location: [facility] [place]  Reason For Study: Shock  Ordering Physician: CHRISTINE MASON  Performed By: Maya Chin RDCS     BSA: 1.6 m2  Height: 62 in  Weight: 129 lb  HR: 74  BP: 79/52 mmHg  _____________________________________________________________________________  __        Procedure  Complete Portable Echo Adult.  _____________________________________________________________________________  __        Interpretation Summary  Global and regional left ventricular function is normal with an EF of 60-65%.  Right ventricular function, chamber size, wall motion, and thickness are  normal.  IVC diameter >2.1 cm collapsing <50% with sniff suggests a high RA pressure  estimated at 15 mmHg or greater.  _____________________________________________________________________________  __        Left Ventricle  Global and regional left ventricular function is normal with an EF of 60-65%.  Left ventricular wall thickness is normal. Left ventricular size is normal.  Left ventricular diastolic function is normal. No regional wall motion  abnormalities are seen.     Right Ventricle  Right ventricular function, chamber size, wall motion, and thickness are  normal.     Atria  The right atria appears normal. Moderate left atrial enlargement is present.     Mitral Valve  The mitral valve is normal. Trace mitral insufficiency is present.        Aortic Valve  The valve leaflets are not well visualized. On Doppler interrogation, there is  no significant stenosis or regurgitation.     Tricuspid Valve  Mild to moderate tricuspid insufficiency is present. The right ventricular  systolic pressure is approximated at  24.6 mmHg plus the right atrial pressure.     Pulmonic Valve  The pulmonic valve is normal.     Vessels  The aorta root is normal. The pulmonary artery and bifurcation cannot be  assessed. IVC diameter >2.1 cm collapsing <50% with sniff suggests a high RA  pressure estimated at 15 mmHg or greater.     Pericardium  No pericardial effusion is present.     _____________________________________________________________________________  __  MMode/2D Measurements & Calculations  IVSd: 0.86 cm  LVIDd: 4.9 cm  LVIDs: 3.0 cm  LVPWd: 0.86 cm  FS: 39.4 %     LV mass(C)d: 146.0 grams  LV mass(C)dI: 92.0 grams/m2  asc Aorta Diam: 2.4 cm  LVOT diam: 2.1 cm  LVOT area: 3.4 cm2  LA Volume Index (BP): 45.3 ml/m2  RWT: 0.35  TAPSE: 1.9 cm        Doppler Measurements & Calculations  MV E max herberth: 84.5 cm/sec  MV A max herberth: 62.5 cm/sec  MV E/A: 1.4  MV dec slope: 502.5 cm/sec2  MV dec time: 0.17 sec     PA acc time: 0.10 sec  TR max herberth: 248.0 cm/sec  TR max P.6 mmHg  E/E' avg: 10.1  Lateral E/e': 9.3  Medial E/e': 10.9     _____________________________________________________________________________  __           Report approved by: Glen Zhang 2021 02:40 PM        *Note: Due to a large number of results and/or encounters for the requested time period, some results have not been displayed. A complete set of results can be found in Results Review.       Discharge Medications   Current Discharge Medication List      START taking these medications    Details   acetaminophen (TYLENOL) 325 MG tablet Take 1 tablet (325 mg) by mouth every 4 hours as needed for mild pain or fever  Qty: 60 tablet    Associated Diagnoses: S/P lung transplant (H)      acetylcysteine (MUCOMYST) 20 % neb solution Take 2 mLs by nebulization 2 times daily  Qty: 120 mL, Refills: 0    Associated Diagnoses: S/P lung transplant (H)      albuterol (PROVENTIL) (2.5 MG/3ML) 0.083% neb solution Take 1 vial (2.5 mg) by nebulization 4 times daily  Qty: 360 mL,  Refills: 0    Associated Diagnoses: S/P lung transplant (H)      apixaban ANTICOAGULANT (ELIQUIS) 2.5 MG tablet Take 1 tablet (2.5 mg) by mouth 2 times daily  Qty: 60 tablet, Refills: 0    Associated Diagnoses: S/P lung transplant (H)      budesonide (PULMICORT) 0.5 MG/2ML neb solution Take 2 mLs (0.5 mg) by nebulization 2 times daily  Qty: 60 ampule, Refills: 0    Associated Diagnoses: S/P lung transplant (H)      Guar Gum (FIBER MODULAR, NUTRISOURCE FIBER,) packet 1 packet by Per Feeding Tube route 2 times daily  Qty: 60 packet, Refills: 0    Associated Diagnoses: S/P lung transplant (H)      insulin aspart (NOVOLOG PEN) 100 UNIT/ML pen Inject 1-12 Units Subcutaneous every 4 hours Follow sliding scale per discharge instructions  Qty: 21.6 mL, Refills: 0    Associated Diagnoses: S/P lung transplant (H)      insulin glargine (LANTUS PEN) 100 UNIT/ML pen Inject 10 Units Subcutaneous every morning (before breakfast)  Qty: 3 mL, Refills: 0    Comments: If Lantus is not covered by insurance, may substitute Basaglar at same dose and frequency.    Associated Diagnoses: S/P lung transplant (H)      loperamide (IMODIUM) 1 MG/7.5ML liquid 15 mLs (2 mg) by Oral or GJ tube route 3 times daily as needed for diarrhea  Qty: 118 mL, Refills: 0    Associated Diagnoses: S/P lung transplant (H)      metoprolol tartrate (LOPRESSOR) 50 MG tablet 1 tablet (50 mg) by Oral or Feeding Tube route 2 times daily  Qty: 60 tablet, Refills: 0    Associated Diagnoses: S/P lung transplant (H)      midodrine (PROAMATINE) 10 MG tablet Take 1 tablet (10 mg) by mouth daily as needed (With dialysis)  Qty: 30 tablet, Refills: 0    Associated Diagnoses: S/P lung transplant (H)      OLANZapine (ZYPREXA) 10 MG tablet 1 tablet (10 mg) by Oral or Feeding Tube route At Bedtime  Qty: 30 tablet, Refills: 0    Associated Diagnoses: S/P lung transplant (H)      pantoprazole (PROTONIX) 2 mg/mL SUSP suspension Take 20 mLs (40 mg) by mouth every morning (before  breakfast)  Qty: 600 mL, Refills: 0    Associated Diagnoses: S/P lung transplant (H)      senna-docusate (SENOKOT-S/PERICOLACE) 8.6-50 MG tablet 2 tablets by Oral or Feeding Tube route 2 times daily as needed for constipation  Qty: 60 tablet, Refills: 0    Associated Diagnoses: S/P lung transplant (H)      sulfamethoxazole-trimethoprim (BACTRIM/SEPTRA) 8 mg/mL suspension 10 mLs (80 mg) by Oral or Feeding Tube route three times a week  Qty: 120 mL, Refills: 0    Comments: Dose based on TMP component.  Associated Diagnoses: S/P lung transplant (H)      !! tacrolimus (GENERIC EQUIVALENT) 1 mg/mL suspension Take 2 mLs (2 mg) by mouth every morning  Qty: 60 mL, Refills: 0    Associated Diagnoses: S/P lung transplant (H)      !! tacrolimus (GENERIC EQUIVALENT) 1 mg/mL suspension Take 2.5 mLs (2.5 mg) by mouth every evening  Qty: 75 mL, Refills: 0    Associated Diagnoses: S/P lung transplant (H)       !! - Potential duplicate medications found. Please discuss with provider.      CONTINUE these medications which have CHANGED    Details   posaconazole (NOXAFIL) 100 MG EC tablet Take 3 tablets (300 mg) by mouth daily  Qty: 90 tablet, Refills: 2    Associated Diagnoses: Aspergillus (H); Lung replaced by transplant (H)         CONTINUE these medications which have NOT CHANGED    Details   melatonin 10 MG TABS tablet 1 tablet (10 mg) by Oral or Feeding Tube route nightly as needed for sleep  Qty: 30 tablet, Refills: 0    Associated Diagnoses: S/P lung transplant (H)      protein modular (PROSOURCE TF) LIQD 1 packet by Per Feeding Tube route daily  Qty: 30 packet, Refills: 0    Associated Diagnoses: S/P lung transplant (H)      B Complex-C-Folic Acid (DIALYVITE) TABS Take 1 tablet by mouth daily      fluticasone (FLONASE) 50 MCG/ACT nasal spray Spray 1 spray into both nostrils daily  Qty: 15.8 mL, Refills: 0    Associated Diagnoses: S/P lung transplant (H)      predniSONE 2.5 MG PO tablet Take two tablets (5mg) every AM and one  tablet (2.5mg) every evening  Qty: 90 tablet, Refills: 11    Comments: TXP DT 3/1/2018 (Lung) TXP Dischg DT 3/24/2018 DX Lung replaced by transplant Z94.2 [facility] ([place], [place])  Associated Diagnoses: Lung replaced by transplant (H)         STOP taking these medications       albuterol (PROAIR HFA/PROVENTIL HFA/VENTOLIN HFA) 108 (90 Base) MCG/ACT inhaler Comments:   Reason for Stopping:         amLODIPine (NORVASC) 5 MG tablet Comments:   Reason for Stopping:         calcium-vitamin D (CALTRATE) 600-400 MG-UNIT per tablet Comments:   Reason for Stopping:         carvedilol (COREG) 25 MG tablet Comments:   Reason for Stopping:         cefTAZidime (FORTAZ) 1 GM vial Comments:   Reason for Stopping:         ferrous sulfate (FEROSUL) 325 (65 Fe) MG tablet Comments:   Reason for Stopping:         furosemide (LASIX) 40 MG tablet Comments:   Reason for Stopping:         lidocaine-prilocaine (EMLA) 2.5-2.5 % external cream Comments:   Reason for Stopping:         magnesium oxide (MAG-OX) 400 MG tablet Comments:   Reason for Stopping:         multivitamin, therapeutic with minerals (THERA-VIT-M) TABS tablet Comments:   Reason for Stopping:         pantoprazole (PROTONIX) 40 MG EC tablet Comments:   Reason for Stopping:         SLOW-MAG 71.5-119 MG TBEC Comments:   Reason for Stopping:         tacrolimus (GENERIC EQUIVALENT) 0.5 MG capsule Comments:   Reason for Stopping:         tacrolimus (GENERIC EQUIVALENT) 1 MG capsule Comments:   Reason for Stopping:             Allergies   No Known Allergies

## 2021-02-25 NOTE — PLAN OF CARE
Speech Language Therapy Discharge Summary    Reason for therapy discharge:    Discharged to acute rehabilitation facility.    Progress towards therapy goal(s). See goals on Care Plan in [facility] electronic health record for goal details.  Goals partially met.  Barriers to achieving goals:   discharge from facility.    Therapy recommendation(s):    Continued therapy is recommended.  Rationale/Recommendations:  Recommend pt initiate diet regular solids/thin liquids. Medications to be whole in puree. Pt should have PMSV on for all PO intake. Rec pt continue to wear PMSV as tolerated when awake. Remove if fatigued, SOB or sleeping. Pt is able to don/doff PMSV and understands guidelines for use with good accuracy..Speech therapy will continue to follow to train safe swallow strategies and ensure continued tolerance of recommended diet

## 2021-02-25 NOTE — CONSULTS
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 She was admitted to [facility] 1/4-2/25/21 for acute hypoxic respiratory failure found to have PJP pneumonia w/ stay c/b ARDS requiring tracheostomy, PEG/J, and dilation of right main bronchus as well as a fib w/ RVR. Transferred to ARU for ongoing rehabilitation. IM following d/t transplant hx.     #History of Lung Transplant.  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 On 2/19/21 had right mainstem bronchus dilation. Recent acute events as below.   - Continue immunosuppression w/ tacrolimus 2 mg AM/2.5 mg PM via G tube only (goal 8-10; checked Q M/Th) and prednisone 5 mg AM/2.5 mg PM (no further taper planned at this time).   - Continue prophylaxis w/ Bactrim Q MWF.   - CMP, CBC, Mg, phos, tacro level Q M/Th and CXR Q Monday.   - Pulmonology to follow here.   - Will have repeat bronch scheduled ~6 weeks (first week of April - TBD).     #Acute Hypoxemic Respiratory Failure s/p Tracheostomy.   #PJP Pneumonia.   Presented in respiratory failure and ultimately failed extubation trials x2 and developed ARDS. Required proning, inhaled epoprostenol, pressors. Tracheostomy done 2/12 and exchanged for #6 cuffless Shiley on 2/22. Completed steroid taper 2/17 and 21 d course of Bactrim 2/15. Was most recently transitioned from continuous trach dome to humidified 2-4L NC w/ trach dome overnight.   - Continue to wean supplemental O2 as  able. This should be humidified when in use. Discussed w/ RN and order placed. TD can be used PRN for additional humidity if needed for secretions.   - Trach to be covered w/ PMSV during the day and HME overnight. Pulm will follow to assist w/ trach weaning here.   - For now continue nebs (QID albuterol, BID Mucomyst, BID Pulmicort).     #History of Left Sided Aspergillus Empyema. Diagnosed 10/2019 s/p amphotericin beads and is on indefinite posaconazole. Last level 2/25 still very low but transitioned from suspension/feeding tube to PO dosing that should be taken w/ food.   - Continue posaconazole 300 mg TID PO.   - Next posaconazole level due 3/2.   - Will need OP ID f/u.     #History of CMV and EBV Viremias. Last CMV PCR 1/25 was <137, level from 2/25 still pending. EBV level 12/9/20 mildly elevated to 10K (log 4) from prior 3K (3.5 log) on 9/9/20, repeat (1/25/21) decreased to 5619 copies (log of 3.8), but increased on 2/22/21 to 75,709 (log of 4.9). This increase is likely secondary to acute illness and recent steroid burst.   - Q2week CMV PCR for now since recently stopping VGCV prophylactic course (next due 3/11).  - Next EBV PCR due 3/22.     #ESRD. Currently on HD Tu/Th/Sat (was biweekly prior to hospitalization) via AVF w/ partial graft that can be challenging to access. Had CRRT 2/5-2/8 d/t hemodynamic instability, otherwise has tolerated iHD (temp line removed 2/9).   - Nephrology following for TTS HD.   - Has required occasional midodrine during HD.   - Ok for just low K diet rather than full renal diet to encourage PO intake.      #PAF. In setting of lung disease and critical illness. Short self limited runs in the hospital. Last echo 1/25/21 w/ normal LVEF and RV size/function, high RA pressure. CHADSVasc 1 for gender corresponding to 1.3% annual stroke risk indication no need for long term oral anticoagulation (per 2/22 EP note), but is on Eliquis.   - Needs OP Ziopatch arranged at ARU discharge.   -  "On metoprolol 50 mg BID.   - On Eliquis although per hospital notes this is for prior DVT.     #Anxiety. On high dose 10 mg HS Zyprexa. May be able to wean this while here.     #Leukopenia. Gradual decrease in WBC during hospitalization, likely d/t treatment-dosed Bactrim for PJP. Was also on VGCV for CMV ppx with steroid burst. Bactrim now back to ppx dosing, VGCV course completed 2/23. Anticipate gradual improvement over the coming weeks. WBC 2.9.   - 2x weekly CBC.     #Steroid Induced Hyperglycemia. HgbA1c 6% on 1/24/21. Is on cycled TFs overnight and now taking PO. Steroid taper was completed 2/17. BG controlled on Lantus 10 units and \"high\" Novolog correction scale Q4hr.   - Continue Lantus 10 units daily.   - Continue Novolog sliding scale but changed from Q4hr to AC/HS.     IM will follow.    Ethel Hernadez PA-C  Hospitalist Service  Pager: [phone]           Chief Complaint:   PJP Pneumonia          HPI:   Kecia Blue is admitted to ARU after hospitalization for PJP pneumonia with acute hypoxic respiratory failure and sepsis. Decompensated to ARDS and underwent tracheostomy, PEG/J placement, and dilation of right main bronchus. Completed steroid taper and 21 day course of Bactrim. Had been off Bactrim post 2018 lung transplant as her CD4 count was above goal.     Currently, patient is feeling well. Pleased to be to next step closer to home. Breathing feels stable, was down to just 2L NC during HD today but has mostly been around 4L. No bowel or bladder complaints. Has started eating slowly, good appetite.          Review of Systems:   A 10 point ROS was performed and negative unless otherwise noted in HPI.          Past Medical History:     I have reviewed this patient's medical history and updated it with pertinent information if needed.   Past Medical History:   Diagnosis Date     Acute on chronic respiratory failure with hypoxia (H) 02/21/2018     Anisocoria      Antisynthetase syndrome (H) 2014 "     Anxiety      Aspergillus (H)      Benign essential hypertension      C. difficile colitis      Cytomegalovirus (CMV) viremia (H)      Dermatomyositis (H)      Dysplasia of cervix, low grade (ESTRADA 1)      EBV (Franklin-Barr virus) viremia      ESRD (end stage renal disease) on dialysis (H)      ILD (interstitial lung disease) (H)      Lung replaced by transplant (H)      Osteopenia      Paroxysmal atrial fibrillation (H)      Pneumocystis jiroveci pneumonia (H)      PONV (postoperative nausea and vomiting)      Pulmonary hypertension (H)      Raynaud's disease      Seronegative rheumatoid arthritis (H)              Past Surgical History:     I have reviewed this patient's surgical history and updated it with pertinent information if needed.  Past Surgical History:   Procedure Laterality Date     BRONCHOSCOPY (RIGID OR FLEXIBLE), DIAGNOSTIC N/A 4/10/2018    Procedure: COMBINED BRONCHOSCOPY (RIGID OR FLEXIBLE), LAVAGE;;  Surgeon: Mariposa Donohue MD;  [facility]: [facility]     BRONCHOSCOPY (RIGID OR FLEXIBLE), DIAGNOSTIC N/A 12/23/2020    Procedure: BRONCHOSCOPY, WITH BRONCHOALVEOLAR LAVAGE;  Surgeon: Uri Bass MD;  [facility]: [facility]     BRONCHOSCOPY (RIGID OR FLEXIBLE), DILATE BRONCHUS / TRACHEA N/A 10/11/2018    Procedure: BRONCHOSCOPY (RIGID OR FLEXIBLE), DILATE BRONCHUS / TRACHEA;  Flexible And Rigid Bronchoscopy and Dilation;  Surgeon: Wilber Lin MD;  [facility]     BRONCHOSCOPY FLEXIBLE N/A 3/13/2018    Procedure: BRONCHOSCOPY FLEXIBLE;  Flexible Bronchoscopy ;  Surgeon: Gissell Sanchez MD;  [facility]: [facility]     BRONCHOSCOPY FLEXIBLE N/A 5/9/2018    Procedure: BRONCHOSCOPY FLEXIBLE;;  Surgeon: Wilber Lin MD;  [facility]: [facility]     BRONCHOSCOPY FLEXIBLE AND RIGID N/A 9/10/2018    Procedure: BRONCHOSCOPY FLEXIBLE AND RIGID;  Flexible and Rigid Bronchoscopy with Balloon Dilation, tissue debulking with cryo, and Right mainstem bronchus stent placement;  Surgeon: Wilber Lin  MD Alana;  [facility]     BRONCHOSCOPY RIGID N/A 6/6/2018    Procedure: BRONCHOSCOPY RIGID;;  Surgeon: Lopez Macias MD;  [facility]: [facility]     BRONCHOSCOPY, DILATE BRONCHUS, STENT BRONCHUS, COMBINED N/A 6/11/2018    Procedure: COMBINED BRONCHOSCOPY, DILATE BRONCHUS, STENT BRONCHUS;  Flexible Bronchoscopy, Balloon Dilation, Bronchial Washings;  Surgeon: Wilber Lin MD;  [facility]     BRONCHOSCOPY, DILATE BRONCHUS, STENT BRONCHUS, COMBINED Right 7/10/2018    Procedure: COMBINED BRONCHOSCOPY, DILATE BRONCHUS, STENT BRONCHUS;  Flexible Bronchoscopy, Balloon Dilation, Bronchial Washings  ;  Surgeon: Wilber Lin MD;  [facility]     BRONCHOSCOPY, DILATE BRONCHUS, STENT BRONCHUS, COMBINED N/A 8/2/2018    Procedure: COMBINED BRONCHOSCOPY, DILATE BRONCHUS, STENT BRONCHUS;  Flexible Bronchoscopy, Bronchial Washings, Balloon Dilation;  Surgeon: Wilber Lin MD;  [facility]     BRONCHOSCOPY, DILATE BRONCHUS, STENT BRONCHUS, COMBINED N/A 8/20/2018    Procedure: COMBINED BRONCHOSCOPY, DILATE BRONCHUS, STENT BRONCHUS;  Flexible Bronchoscopy, Balloon Dilation;  Surgeon: Wilber Lin MD;  [facility]     BRONCHOSCOPY, DILATE BRONCHUS, STENT BRONCHUS, COMBINED N/A 10/29/2018    Procedure: Flexible Bronchoscopy, Balloon Dilation, Stent Revision, Airway Examination And Therapeutic Suctioning, Cyro Tumor Debulking;  Surgeon: Wilber Lin MD;  [facility]     BRONCHOSCOPY, DILATE BRONCHUS, STENT BRONCHUS, COMBINED N/A 11/20/2018    Procedure: Rigid Bronchoscopy, Stent Removal and dilitation;  Surgeon: Wilber Lin MD;  [facility]     BRONCHOSCOPY, DILATE BRONCHUS, STENT BRONCHUS, COMBINED N/A 12/14/2018    Procedure: Flexible And Rigid Bronchoscopy, Balloon Dilation, Bronchial Washing;  Surgeon: Wilber Lin MD;  [facility]     BRONCHOSCOPY, DILATE BRONCHUS, STENT BRONCHUS, COMBINED N/A 1/17/2019    Procedure: Flexible  And Rigid Bronchoscopy, Balloon Dilation.;  Surgeon: Wilber Lin MD;  [facility]     BRONCHOSCOPY, DILATE BRONCHUS, STENT BRONCHUS, COMBINED N/A 3/7/2019    Procedure: Flexible and Rigid Bronchoscopy, Bronchial Washing, Balloon Dilation;  Surgeon: Wilber Lin MD;  [facility]     BRONCHOSCOPY, DILATE BRONCHUS, STENT BRONCHUS, COMBINED N/A 6/6/2019    Procedure: Rigid and Flexible Bronchoscopy, Balloon Dilation;  Surgeon: Wilber Lin MD;  [facility]     BRONCHOSCOPY, DILATE BRONCHUS, STENT BRONCHUS, COMBINED N/A 10/11/2019    Procedure: Flexible and Rigid Bronchoscopy, Balloon Dilation, Bronchoalveolar Lagave;  Surgeon: Wilber Lin MD;  [facility]     BRONCHOSCOPY, DILATE BRONCHUS, STENT BRONCHUS, COMBINED N/A 2/19/2021    Procedure: BRONCHOSCOPY, flexible, airway dilation, and bronchial wash;  Surgeon: Wilber Lin MD;  [facility]     CV RIGHT HEART CATH MEASUREMENTS RECORDED N/A 3/10/2020    Procedure: CV RIGHT HEART CATH;  Surgeon: Wai Garcia MD;  [facility]     ENT SURGERY      tonsillectomy as a child     ESOPHAGOSCOPY, GASTROSCOPY, DUODENOSCOPY (EGD), COMBINED N/A 10/29/2018    Procedure: COMBINED ESOPHAGOSCOPY, GASTROSCOPY, DUODENOSCOPY (EGD) with biopsies and polypectomy;  Surgeon: Chente Bloom MD;  [facility]     INSERT EXTRACORPORAL MEMBRANE OXYGENATOR ADULT (OUTSIDE OR) N/A 2/27/2018    Procedure: INSERT EXTRACORPORAL MEMBRANE OXYGENATOR ADULT (OUTSIDE OR);  INSERT EXTRACORPORAL MEMBRANE OXYGENATOR ADULT (OUTSIDE OR) ;  Surgeon: Toby Hernandez MD;  [facility]     IR CVC TUNNEL PLACEMENT > 5 YRS OF AGE  10/25/2019     IR GASTRO JEJUNOSTOMY TUBE PLACEMENT  2/16/2021     IR PARACENTESIS  1/8/2020     IR THORACENTESIS  9/13/2019     IR TRANSCATHETER BIOPSY  1/8/2020     no prior surgery       REMOVE EXTRACORPORAL MEMBRANE OXYGENATOR ADULT N/A 3/3/2018    Procedure:  REMOVE EXTRACORPORAL MEMBRANE OXYGENATOR ADULT;  Removal of Right Femoral Venous and Right Axillary Arterial Extracorporeal Membrane Oxygenator;  Surgeon: Toby Hernandez MD;  [facility]     TRANSPLANT LUNG RECIPIENT SINGLE X2 Bilateral 3/1/2018    Procedure: TRANSPLANT LUNG RECIPIENT SINGLE X2;  Median Sternotomy, Extracorporeal Membrane Oxygenator, bilateral sequential lung transplant;  Surgeon: Toby Hernandez MD;  [facility]             Social History:     Social History     Tobacco Use     Smoking status: Never Smoker     Smokeless tobacco: Never Used   Substance Use Topics     Alcohol use: No     Alcohol/week: 1.0 standard drinks     Types: 1 Glasses of wine per week     Drug use: No     Lives with  near [place].          Family History:     I have reviewed this patient's family history and updated it with pertinent information if needed.   Family History   Problem Relation Age of Onset     Hypertension Mother      Arthritis Mother      Pancreatic Cancer Father      Diabetes Father             Allergies:    No Known Allergies         Medications:     Prior to Admission Medications   Prescriptions Last Dose Informant Patient Reported? Taking?   B Complex-C-Folic Acid (DIALYVITE) TABS 2021 at Unknown time Self Yes Yes   Sig: Take 1 tablet by mouth daily   Guar Gum (FIBER MODULAR, NUTRISOURCE FIBER,) packet 2021 at Unknown time  No Yes   Si packet by Per Feeding Tube route 2 times daily   OLANZapine (ZYPREXA) 10 MG tablet 2021 at Unknown time  No Yes   Si tablet (10 mg) by Oral or Feeding Tube route At Bedtime   acetaminophen (TYLENOL) 325 MG tablet Unknown at Unknown time  No No   Sig: Take 1 tablet (325 mg) by mouth every 4 hours as needed for mild pain or fever   acetylcysteine (MUCOMYST) 10 % nebulizer solution 2021 at Unknown time  No Yes   Sig: Inhale 4 mLs into the lungs 2 times daily   albuterol (PROVENTIL) (2.5 MG/3ML) 0.083% neb  solution 2021 at Unknown time  No Yes   Sig: Take 1 vial (2.5 mg) by nebulization 4 times daily   apixaban ANTICOAGULANT (ELIQUIS) 2.5 MG tablet 2021 at Unknown time  No Yes   Sig: Take 1 tablet (2.5 mg) by mouth 2 times daily   budesonide (PULMICORT) 0.5 MG/2ML neb solution 2021 at Unknown time  No Yes   Sig: Take 2 mLs (0.5 mg) by nebulization 2 times daily   fluticasone (FLONASE) 50 MCG/ACT nasal spray Unknown at Unknown time  No No   Sig: Spray 1 spray into both nostrils daily   insulin aspart (NOVOLOG PEN) 100 UNIT/ML pen 2021 at Unknown time  No Yes   Sig: Inject 1-12 Units Subcutaneous every 4 hours Follow sliding scale per discharge instructions   insulin glargine (LANTUS PEN) 100 UNIT/ML pen 2021 at Unknown time  No Yes   Sig: Inject 10 Units Subcutaneous every morning (before breakfast)   loperamide (IMODIUM) 1 MG/7.5ML liquid Unknown at Unknown time  No No   Sig: 15 mLs (2 mg) by Oral or GJ tube route 3 times daily as needed for diarrhea   melatonin 10 MG TABS tablet Unknown at Unknown time  No No   Si tablet (10 mg) by Oral or Feeding Tube route nightly as needed for sleep   metoprolol tartrate (LOPRESSOR) 50 MG tablet 2021 at Unknown time  No Yes   Si tablet (50 mg) by Oral or Feeding Tube route 2 times daily   midodrine (PROAMATINE) 10 MG tablet 2021 at Unknown time  No Yes   Sig: Take 1 tablet (10 mg) by mouth daily as needed (With dialysis)   pantoprazole (PROTONIX) 2 mg/mL SUSP suspension 2021 at Unknown time  No Yes   Sig: Take 20 mLs (40 mg) by mouth every morning (before breakfast)   posaconazole (NOXAFIL) 100 MG EC tablet 2021 at Unknown time  No Yes   Sig: Take 3 tablets (300 mg) by mouth daily   predniSONE 2.5 MG PO tablet 2021 at Unknown time Self No Yes   Sig: Take two tablets (5mg) every AM and one tablet (2.5mg) every evening   protein modular (PROSOURCE TF) LIQD 2021 at Unknown time  No Yes   Si packet by Per Feeding Tube  route daily   senna-docusate (SENOKOT-S/PERICOLACE) 8.6-50 MG tablet Unknown at Unknown time  No No   Si tablets by Oral or Feeding Tube route 2 times daily as needed for constipation   sulfamethoxazole-trimethoprim (BACTRIM/SEPTRA) 8 mg/mL suspension 2021 at Unknown time  No Yes   Sig: 10 mLs (80 mg) by Oral or Feeding Tube route three times a week   tacrolimus (GENERIC EQUIVALENT) 1 mg/mL suspension 2021 at Unknown time  No Yes   Sig: Take 2 mLs (2 mg) by mouth every morning   tacrolimus (GENERIC EQUIVALENT) 1 mg/mL suspension 2021 at Unknown time  No Yes   Sig: Take 2.5 mLs (2.5 mg) by mouth every evening      Facility-Administered Medications: None             Physical Exam:   VS reviewed.   GENERAL: Alert and oriented x 3. Sitting up in bed, appears comfortable. Pleasant and conversant.  at bedside.   HEENT: Anicteric sclera. Mucous membranes moist.   CV: RRR. S1, S2. No murmurs appreciated.   NECK: Trach w/ speaking valve in place.   RESPIRATORY: Effort normal on 4L NC. Lungs w/ a small amount of scattered coarseness, otherwise clear w/ good air movement.   GI: Abdomen soft, non distended, non tender. PEG/J in place.   NEUROLOGICAL: No focal deficits. Moves all extremities.   EXTREMITIES: No peripheral edema. Intact bilateral pedal pulses.   SKIN: No jaundice. No rashes.

## 2021-02-25 NOTE — PLAN OF CARE
Patient arrived to unit around 1430 [facility]. Had dialysis run before admission. Settled into room and unit orientation gone over. Suction set up. Katty #6 anam and Katty #4 anam at bedside. Anam has speaking valve on. Currently on 4 LPM of O2 w/ NC. Call light within reach and bed alarm on.

## 2021-02-25 NOTE — DISCHARGE INSTRUCTIONS
Correction Scale - HIGH INSULIN RESISTANCE DOSING      Do Not give Correction Insulin if BG less than 140   For  - 164 give 1 unit.   For  - 189 give 2 units.   For  - 214 give 3 units.   For  - 239 give 4 units.   For  - 264 give 5 units.   For  - 289 give 6 units.   For  - 314 give 7 units.   For  - 339 give 8 units   For  - 364 give 9 units   For  - 389 give 10 units   For  - 414 give 11 units   For BG greater than or equal to 415 give 12 units   Check blood glucose Q4H and administer based on blood glucose.   Notify provider if glucose greater than or equal to 350 mg/dL after administration of correction dose.   If given at mealtime, administer within 30 minutes of start of meal

## 2021-02-25 NOTE — PROGRESS NOTES
DISCHARGE                         2/25/2021  2:00 PM  ----------------------------------------------------------------------------  Discharged to: [facility]  Via: EMS transport  Discharge Instructions: diet, activity, medications, follow up appointments, when to call the MD, aftercare instructions printed for ARU.  Belongings: All sent with pt. Suitcase, cell phone, and ipad sent with pt's .   IV: d/c'd  Telemetry: d/c'd  Pt exhibits understanding of above discharge instructions; all questions answered.    A/Ox4. VSS on 4L NC with PMSV over trach.

## 2021-02-25 NOTE — PLAN OF CARE
Occupational Therapy Discharge Summary    Reason for therapy discharge:    Discharged to acute rehabilitation facility.    Progress towards therapy goal(s). See goals on Care Plan in [facility] electronic health record for goal details.  Goals partially met.  Barriers to achieving goals:   discharge from facility.    Therapy recommendation(s):    Continued therapy is recommended.  Rationale/Recommendations:  Pt remains below baseline but making great progress. Is highly motivated and has great family support. Will benefit from intensive therapies to promote return to PLOF.

## 2021-02-25 NOTE — PROGRESS NOTES
HEMODIALYSIS TREATMENT NOTE    Date: 2/25/2021  Time: 1:48 PM    Data:  Pre Wt: 56 kg (123 lb 7.3 oz)   Desired Wt: 53 kg   Post Wt: 53 kg (116 lb 13.5 oz)(estimate)  Weight change: 3 kg  Ultrafiltration - Post Run Net Total Removed (mL): 3000 mL  Vascular Access Status: Fistula  patent  Dialyzer Rinse: Streaked  Total Blood Volume Processed: 78.7 L   Total Dialysis (Treatment) Time: 3.5hrs   Dialysate Bath: K 2, Ca 2.5  Heparin: None    Lab:   No    Interventions & Assessment:  3.5hr TX, 3L removed, Max Crit-line:-12.2.  Midodrine given for BP support.   Zofran given for pre dialysis nausea.  Lidocaine used for access.  Iron given. (see MAR).   PT tolerated TX well, with no complaints & remained asymptomatic.  Handoff report given to PCN.     Plan:    PT to discharge today.

## 2021-02-25 NOTE — PLAN OF CARE
"/71 (BP Location: Right arm)   Pulse 93   Temp 98  F (36.7  C) (Oral)   Resp 18   Ht 1.6 m (5' 2.99\")   Wt 52.2 kg (115 lb)   LMP 06/07/2014 (Exact Date)   SpO2 97%   BMI 20.38 kg/m      VSS. Afebrile. Alert and oriented x 4. 3L humidified nc overnight. Trach cares last completed at 0400. Good, productive cough. No suctioning needed. Has denied pain. Tolerating TF's/Renal diet with no n/v. Independently positioning in bed. Plan for discharge to TCU at 14:00 this afternoon. Continue to monitor.  "

## 2021-02-25 NOTE — PLAN OF CARE
PT / 6B - Pt leaving for dialysis on AM attempt. Will reattempt as able otherwise reschedule per POC.

## 2021-02-25 NOTE — PROGRESS NOTES
CLINICAL NUTRITION SERVICES - ASSESSMENT NOTE     Nutrition Prescription    RECOMMENDATIONS FOR MDs/PROVIDERS TO ORDER:  To encourage po intakes, consider changing Renal diet to just low potassium diet only to allow more menu choices  Pending calorie counts, recommend to discontinue TF if patient is able to meet a minimum of 815 kcal and 68 g protein orally (meeting 50% of needs).    Malnutrition Status:    Non-severe malnutrition in the context of chronic illness    Recommendations already ordered by Registered Dietitian (RD):  Ordered calorie counts (2/26-2/28)  Ordered Boost Breeze (berry) at PM snack and Boost Shake (chocolate) at HS snack.    Recommend to further cycle TF regimen to Nutren 1.5 @ 70 ml/hr x 12 hours (6pm-6am) + 1 packet prosource BID and 1 pkt nutrisouce fiber BID  = 840 mL, 1370 kcal (27 kcal/kg), 79 g protein (1.6 g/kg), 156 g CHO, 6 g fiber, and 818 ml free water  - FWF: 30 mL q 4 hr    Future/Additional Recommendations:  Monitor PO intake, calorie counts, supplement use, and wt trends     REASON FOR ASSESSMENT  Kecia Blue is a/an 60 year old female assessed by the dietitian for Provider Order - Registered Dietitian to assess/monitor TF. RD to adjust if TF held for med administration.    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 She was extuabted 1/26/21, course was complicated by ARDS requiring requiring reintubation, proning, in context of ongoing PJP pneumonia.  She underwent trach placement 2/12/21 and PEG-J 2/16/21.  Underwent dilation of right main stem bronchus 2/19/21.       NUTRITION HISTORY  - Per [place] RD notes, patient was initiated on TF on  "1/24 due to intubation. Throughout course of hospitalization, patient was intubated and extubated several times, and was eventually given a trach 2/12 and patient remained NPO on TF. PEG-J placed 2/16. Patient's diet advancement was pending SLP swallow evaluation 2/24.      CURRENT NUTRITION ORDERS  Diet: Renal  Nutrition Support: Cycled Enteral Nutrition: Nutren 1.5 @ 70 ml/hr x 18 hours (4pm-10 am) + 1 packet prosource BID and 1 pkt nutrisouce fiber BID  = 1260 mL, 2000 kcal (40 kcal/kg), 107 g protein (2.1 g/kg), 229 g CHO, 6 g fiber 1138 mL free water   FWF: 30 mL q 4 hr  - Minimal information recorded in I/Os. If patient receiving full volume, exceeding calorie and protein estimated needs.    Intake/Tolerance: Patient reports she has been ordering meals regularly since diet advancement, and eating about 50% each time. Not having any snacks at this time. Drinking ~1 L of water daily. She is motivated to eat and wants to discontinue TF as soon as able. She feels restricted by the renal diet, especially phosphorus restriction    LABS  Labs reviewed  K 5.4 (H)  BUN 65 (H), Cr 3.25 (H)    MEDICATIONS  Medications reviewed  Nephronex, nutrisource fiber 1 pkt BID, novolog (correction scale), lantus, protonix, prosource 1 pkt BID, tacrolimus    ANTHROPOMETRICS  Height: 160 cm (5' 3\")  Most Recent Weight: 49.8 kg (109 lb 12.8 oz)    IBW: 52.3 kg  BMI: Normal BMI  Weight History: Some fluctuations in wt, but overall 21 lb (16%) weight loss in ~6 months and 11 lb (9%) weight loss in 1 week. Patient reports her usual body wt is ~120lb. Some fluctuations likely fluid related and dialysis.  Wt Readings from Last 10 Encounters:   02/25/21 49.8 kg (109 lb 12.8 oz)   02/25/21 53 kg (116 lb 13.5 oz)     02/24/21  52.2 kg (115 lb) - standing scale   02/24/21  50.4 kg (111 lb 1.8 oz)   02/21/21  54.8 kg (120 lb 13 oz)   02/20/21 54.2 kg (119 lb 7.8 oz)   02/19/21  56.1 kg (123 lb 9.6 oz)   02/17/21  54.7 kg (120 lb 9.5 oz) "   02/17/21  57.7 kg (127 lb 3.3 oz)   02/16/21  57.1 kg (125 lb 14.1 oz)   02/15/21  59.4 kg (130 lb 14.4 oz     12/23/20 57.6 kg (127 lb)   12/09/20 57.6 kg (127 lb)   11/20/20 56.7 kg (125 lb)   09/09/20 60.8 kg (134 lb)   08/13/20 59 kg (130 lb)   08/06/20 59 kg (130 lb)   06/17/20 56.6 kg (124 lb 11.2 oz)   03/10/20 54.9 kg (121 lb)       Dosing Weight: 50 kg     ASSESSED NUTRITION NEEDS  Estimated Energy Needs: 7967-9754 kcals/day (30 - 35 kcals/kg ) - refer to [place]  Justification: Repletion and Dialysis  Estimated Protein Needs: 60-75 grams protein/day (1.2 - 1.5 grams of pro/kg)  Justification: Repletion and Dialysis   Estimated Fluid Needs: 6864-8972 mL/day (1 mL/kcal)   Justification: Maintenance    PHYSICAL FINDINGS  See malnutrition section below.    MALNUTRITION  % Intake: No decreased intake noted   % Weight Loss: > 10% in 6 months (severe)  Subcutaneous Fat Loss: Facial region:  moderate  Muscle Loss: Thoracic region (clavicle, acromium bone, deltoid, trapezius, pectoral):  moderate and Upper arm (bicep, tricep):  moderate  Fluid Accumulation/Edema: None noted  Malnutrition Diagnosis: Severe malnutrition in the context of acute on chronic illness    NUTRITION DIAGNOSIS  Inadequate oral intake related to prior NPO status with patient just returning to oral diet as evidenced by currently eating ~50% of meals and patient reliant on EN to meet needs.    INTERVENTIONS  Implementation  Nutrition Education: Discussed minimum calorie and protein needs to discontinue TF, and plan to decrease TF from 18 hrs to 12 hrs tonight. Pending calorie counts, consider holding TF Monday. Discussed TF flushes for patency if fluid intake remains sufficient. Also discussed the possibility to liberalize diet to potassium-restricted to allow for more options as patient verbalized feeling restricted. Encouraged adequate PO and supplements.  Medical food supplement therapy - see recs above    Goals  Total avg nutritional  intake to meet a minimum of 30 kcal/kg and 1.2 g PRO/kg daily (per dosing wt 50 kg).     Monitoring/Evaluation  Progress toward goals will be monitored and evaluated per protocol.    Maryam Adkins   Dietetic Roc

## 2021-02-25 NOTE — PROGRESS NOTES
Pulmonary Medicine  Cystic Fibrosis - Lung Transplant Team  Progress Note  2021       Patient: Kecia Blue  MRN: [id number]  : 1960 (age 60 year old)  Transplant: 3/1/2018 (Lung), POD#[id number]  Admission date: 2021    Assessment & Plan:     Kecia Blue is a 60 year old female with PMH of BSLT () for ILD with antisynthetase sydrome, postoperative course c/b right mainstem bronchial stenosis s/p several dilations, left-sided Aspergillus empyema () s/p amphotericin beads (2020), EBV viremia, CMV viremia, and ESRD on HD .  Patient was admitted to OSH on  for acute hypoxemic respiratory failure and new lung opacities. Intubated  and transferred to [facility] for ongoing management.  Extubated  but reintubated - for progressive hypoxemia.  Rapid decompensation /3 with ARDS presentation requiring reintubation, prone positioning, and inhaled epoprostenol, suspected d/t worsening PJP. S/p Trach , and PEG-J ().  S/p dilation of right main by IP on .  Hypoxia and oxygen support continues to gradually improve.  Discharging this afternoon to ARU for deconditioning.      Today's recommendations:  - Supplemental O2 with humidified NC, TD prn for additional humidity with tenacious secretions  - Trach to be covered with PMSV during the day and HME overnight (should not be left uncovered), we will assist with trach weaning at ARU  - Continue nebs for now  - CXR weekly qMonday  - Repeat IP bronch in 6 weeks (~/)  - Tacro via G tube only, level today therapeutic so no dose change, levels qMTh at ARU  - Posaconazole level still very low, will transition from suspension to PO dosing (given VFSS) and should be taken with food, defer dose adjustment at this time in favor of modified administration techniques and repeat level 3/2 to trend (levels run qTTh)   - F/u with ID as OP and review plan for posaconazole course (currently indefinite, but may be able to limit  to defined course?)  - CMV level pending today, follow [id number] initially after completion of VGCV ppx course  - Recheck EBV 3/22  - Per renal, iHD qTTS  - Per EP, will need Zio patch prior to discharge from ARU and follow-up outpatient  - Our team will continue to follow qMTh at ARU     Acute hypoxemic respiratory failure:   Right post-obstructive Stenotrophomonas and Eikenella pneumonia:  PJP pneumonia:  Septic shock, Resolved:   Airway stenosis with distal plugging s/p right mainstem dilation (2/19):  ARDS: Presented to OSH ED with increased SOB and hypoxia, intubated 1/24 with need for pressors, transferred to [facility].  PTA bronch (12/23/20) with moderate airway obstruction and RML/RLL mucous plugging, cultures with Stenotrophomonas and Eikenella.  OSH CT chest with new multifocal GGO bilaterally and increased left loculated pleural effusion.  Repeat bronch (1/24) with RUL BAL with thick copious secretions to RML/RLL, PJP PCR positive.  Extubated on 1/26, but reintubated from 1/27-1/29 for progressive hypoxemia.  Continued to require significant PEEP, eventually decompensated on 2/3 and again reintubated.  Presentation consistent with ARDS (tx: prone positioning, inhaled epoprostenol, pressors).  CT chest (2/3) with progressive consolidation and possible superimposed infection findings, stable LLL chronic empyema.  Bronch on 2/11 with persistent right mainstem stenosis with moderate airway obstruction.  S/p trach 2/12 by IP, downsized to Shiley #6 (uncuffed) on 2/22.  Bronch cultures (2/19) with Staph epidermidis (no indication to treat).  S/p steroid burst with taper through 2/17 and 21 day Bactrim course for PJP through 2/15.  Last CXR on 2/22 grossly unchanged in regard to mixed opacities. Hypoxia improving, TD exclusively 2/19, weaned off TD 2/24.  - Supplemental O2 with humidified NC, TD prn for additional humidity with tenacious secretions  - Trach to be covered with PMSV during the day and HME overnight  (should not be left uncovered), we will assist with trach weaning at ARU  - Nebs: albuterol QID, Mucomyst 10% 4 ml BID, and Pulmicort BID  - CXR weekly qMonday  - Repeat IP bronch in 6 weeks (~4/2)       S/p bilateral lung transplant for ILD 2/2 CTD: Last seen in pulmonary clinic 12/2. DSA (1/25) not detected.     Immunosuppression: On 2-drug IST d/t recurrent infections  - Tacrolimus 2 mg qAM / 2.5 mg qPM (via G tube only).  Goal level 8-10.  Level today 8.1, no dose change, levels qMTh at ARU.  - Chronic prednisone 5 mg qAM / 2.5 mg qPM      Prophylaxis:   - Bactrim qMWF for PJP ppx     Aspergillus empyema (left-sided): Noted 10/8/19, negative in November and again on admission.  CT scan on 7/17/20 with increased mass-like density, likely pleural-based, in LLL area s/p needle aspiration (8/13/20) with Aspergillus fumigatus on cultures s/p intrapleural amphotericin bead placement (11/20).  Follows with ID as OP.  LFTs stable on admission (ALP chronically mildly elevated).  CT chest with increased loculated left pleural effusion s/p thoracentesis (1/25), exudative with 87% neutrophils, very high LDL (6308), cytology normal, AFB pleural cultures NGTD-stain negative.  - Posaconazole 300 mg TID (indefinite course per ID), will transition from suspension to PO dosing (given VFSS and concern for poor absorption with suspension) and should be taken with food, defer dose adjustment at this time in favor of modified administration techniques and repeat level 3/2 to trend (levels run qTTh)   - F/u with ID as OP and review plan for posaconazole course (currently indefinite, but may be able to limit to defined course?)     H/o CMV viremia: CMV D+/R+.  CMV <137 (1/25).  VGCV ppx with steroid burst, completed 2/23.  - CMV level pending today, follow [id number] initially after completion of VGCV ppx course     EBV viremia: Level 12/9/20 mildly elevated to 10K (log 4) from prior 3K (3.5 log) on 9/9/20, repeat (1/25/21) decreased  to 5619 copies (log of 3.8), but increased on 2/22/21 to 75,709 ( log of 4.9). This increase is likely secondary to acute illness and recent steroid burst.   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 Chronic iHD M/Th via AVF with partial graft.  Not able to tolerate iHD 2/3 d/t hypotension, line placed and transitioned to CRRT 2/4. Now back to iHD, qTTS schedule.  Dialysis management per nephrology.    Leukopenia: Gradual decrease in WBC since admission, likely d/t treatment-dosed Bactrim for PJP as above.  Also on VGCV for CMV ppx with steroid burst.  Bactrim now back to ppx dosing as above, VGCV course through 2/23.  Anticipate gradual improvement over the coming weeks.     Atrial fibrillation w/ RVR:  H/o DVT:  H/o paroxysmal AF, most recent recurrence 1/30 with RVR (-140s), likely in the setting of acute pulmonary illness.  EP consulted (2/22), recommended the Zio patch prior to discharge and follow-up outpatient.      Oropharyngeal candidiasis, Resolved: S/p nystatin 2/1-2/16.     We appreciate the excellent care provided by the [place] [id number] team. Recommendations communicated via in person rounding and this note. Our team will continue to follow qMTh at ARU.    Patient discussed with Dr. Christensen.    Hina Huff, DNP, APRN, CNP  Inpatient Nurse Practitioner  Pulmonary CF/Transplant     Subjective & Interval History:     Weaned off TD yesterday and now remains stable with humidified NC 3-4L.  Independent with secretions (no nursing suction attempts noted), strong cough.  VFSS per SLP, diet advanced to regular (renal) with thins.  Pt. reports eating half of a regular meal for dinner last night, tolerated well with no dysphagia or coughing.    Review of Systems:     C: No fever, no chills, + increased weight  INTEGUMENTARY/SKIN: No rash or obvious new lesions  ENT/MOUTH: No sore throat, no sinus pain, no nasal congestion or  "drainage  RESP: See interval history  CV: No chest pain, no palpitations, no peripheral edema, no orthopnea  GI: No nausea, no vomiting, + stable loose stools, no reflux symptoms  : + anuric  MUSCULOSKELETAL: No myalgias, no arthralgias  ENDOCRINE: Blood sugars with adequate control  NEURO: No headache, no numbness or tingling  PSYCHIATRIC: Mood stable    Physical Exam:     Vital signs:  Temp: 98  F (36.7  C) Temp src: Oral BP: 97/68 Pulse: 93   Resp: 18 SpO2: 100 % O2 Device: Nasal cannula with humidification Oxygen Delivery: 4 LPM Height: 160 cm (5' 2.99\") Weight: 56 kg (123 lb 7.3 oz)  I/O:     Intake/Output Summary (Last 24 hours) at 2/25/2021 0715  Last data filed at 2/25/2021 0600  Gross per 24 hour   Intake 1490 ml   Output --   Net 1490 ml     Constitutional: Lying in bed in dialysis, in no apparent distress.   HEENT: Eyes with pink conjunctivae, anicteric. Oral mucosa moist without lesions. Trach cdi.   PULM: Mildly diminished air flow bilaterally. Prominent audible congestion R>L (superficial, large airways), no rhonchi, no wheezes. Non-labored breathing on 4L NC, easily weaned to 2L.  CV: Normal S1 and S2. RRR. No murmur, gallop, or rub. No peripheral edema.   ABD: NABS, soft, nontender, nondistended.  PEG/J site not visualized  MSK: Moves all extremities. No apparent muscle wasting.   NEURO: Alert and oriented, conversant, phonating well with PMSV.   SKIN: Warm, dry. No rash on limited exam.   PSYCH: Mood stable.     Lines, Drains, and Devices:  Peripheral IV 02/16/21 Right;Posterior Lower forearm (Active)   Site Assessment WDL 02/25/21 0400   Line Status Saline locked 02/25/21 0400   Phlebitis Scale 0-->no symptoms 02/25/21 0400   Infiltration Scale 0 02/25/21 0400   Infiltration Site Treatment Method  None 02/24/21 1200   Number of days: 9       Hemodialysis Vascular Access Arteriovenous fistula Left Arm (Active)   Site Assessment WDL;Bruit present;Thrill present 02/25/21 0400   Cannulation Needle " Size 15 02/23/21 1700   Dressing Intervention New dressing 02/23/21 1700   Dressing Status Clean, dry, intact 02/25/21 0400   Verification by x-ray Not applicable 02/21/21 1600   Hand Off Report Yes 02/23/21 1700   Number of days: 31     Data:     LABS    CMP:   Recent Labs   Lab 02/25/21  0542 02/24/21  0539 02/23/21  0513 02/22/21  0527 02/20/21  0503   * 132* 132* 131* 132*   POTASSIUM 5.4* 4.3 5.3 4.7 4.2   CHLORIDE 98 98 98 99 98   CO2 29 28 28 27 28   ANIONGAP 5 6 6 5 6   * 140* 140* 136* 154*   BUN 65* 35* 58* 38* 32*   CR 3.25* 2.18* 3.43* 2.53* 2.37*   GFRESTIMATED 15* 24* 14* 20* 22*   GFRESTBLACK 17* 28* 16* 23* 25*   CHELSEY 9.0 9.1 9.2 8.8 8.6   MAG  --  1.7  --  1.6  --    PROTTOTAL 6.6*  --   --   --  6.4*   ALBUMIN 2.6*  --   --   --  2.4*   BILITOTAL 0.4  --   --   --  0.4   ALKPHOS 345*  --   --   --  244*   AST 15  --   --   --  15   ALT 41  --   --   --  34     CBC:   Recent Labs   Lab 02/25/21  0542 02/23/21  0513 02/22/21  0527 02/20/21  0503   WBC 2.9* 3.3* 3.0* 2.5*   RBC 3.15* 2.94* 3.14* 3.13*   HGB 9.9* 9.2* 9.7* 10.0*   HCT 32.0* 29.8* 31.1* 31.5*   * 101* 99 101*   MCH 31.4 31.3 30.9 31.9   MCHC 30.9* 30.9* 31.2* 31.7   RDW 20.5* 20.8* 21.2* 21.5*    253 241 217       INR:   Recent Labs   Lab 02/19/21  0458   INR 0.99       Glucose:   Recent Labs   Lab 02/25/21  0542 02/25/21  0327 02/24/21  2335 02/24/21  2007 02/24/21  1555 02/24/21  1227 02/24/21  1132 02/24/21  0539 02/24/21  0539 02/23/21  0513 02/23/21  0513 02/22/21  0527 02/22/21  0527 02/20/21  0503 02/20/21  0503 02/19/21  0458 02/19/21  0458   *  --   --   --   --   --   --   --  140*  --  140*  --  136*  --  154*  --  102*   BGM  --  180* 164* 173* 151* 112* 101*   < >  --    < >  --    < >  --    < >  --    < >  --     < > = values in this interval not displayed.       Blood Gas: No lab results found in last 7 days.    Culture Data   Recent Labs   Lab 02/19/21  0853   CULT No growth after 5 days   Culture negative after 4 days  Moderate growth  Staphylococcus epidermidis  *  Culture received and in progress.  Positive AFB results are called as soon as detected.    Final report to follow in 7 to 8 weeks.    Assayed at [facility] [address] [place] [id number] [phone]       Virology Data:   Lab Results   Component Value Date    FLUAH1 Not Detected 01/24/2021    FLUAH3 Not Detected 01/24/2021    VB8618 Not Detected 01/24/2021    IFLUB Not Detected 01/24/2021    RSVA Not Detected 01/24/2021    RSVB Not Detected 01/24/2021    PIV1 Not Detected 01/24/2021    PIV2 Not Detected 01/24/2021    PIV3 Not Detected 01/24/2021    HMPV Not Detected 01/24/2021    HRVS Negative 01/24/2021    ADVBE Negative 01/24/2021    ADVC Negative 01/24/2021    ADVC Negative 12/23/2020    ADVC Negative 10/07/2019       Historical CMV results (last 3 of prior testing):  Lab Results   Component Value Date    CMVQNT <137 (A) 01/25/2021    CMVQNT 238 (A) 01/24/2021    CMVQNT 675 (A) 12/23/2020     Lab Results   Component Value Date    CMVLOG <2.1 01/25/2021    CMVLOG 2.4 (H) 01/24/2021    CMVLOG 2.8 (H) 12/23/2020       Urine Studies    Recent Labs   Lab Test 01/24/21  1729 10/21/19  2240   URINEPH 5.0 5.0   NITRITE Negative Negative   LEUKEST Moderate* Large*   WBCU 34* 115*       Most Recent Breeze Pulmonary Function Testing (FVC/FEV1 only)  FVC-Pre   Date Value Ref Range Status   12/09/2020 1.12 L    09/09/2020 1.56 L    03/09/2020 1.57 L    02/19/2020 1.78 L      FVC-%Pred-Pre   Date Value Ref Range Status   12/09/2020 34 %    09/09/2020 47 %    03/09/2020 48 %    02/19/2020 54 %      FEV1-Pre   Date Value Ref Range Status   12/09/2020 0.98 L    09/09/2020 1.10 L    03/09/2020 0.96 L    02/19/2020 0.99 L      FEV1-%Pred-Pre   Date Value Ref Range Status   12/09/2020 37 %    09/09/2020 42 %    03/09/2020 37 %    02/19/2020 38 %        IMAGING    Recent Results (from the past 48 hour(s))   XR Video Swallow with SLP or  OT    Narrative    Examination:  Modified Barium Swallow Study with Speech Pathology,  performed 2/24/2021    Comparison: None.    History: Recent tracheostomy to 2/12/2021    Fluoroscopy time: 1.7 minutes.    Findings: Under fluoroscopic guidance, the patient was given orally  administered barium of varying consistencies in the presence of the  speech pathology service.  The oral phase was normal. Consistent delay  of swallow initiating at the piriform. There is flash penetration  without aspiration of thin liquids. No evidence for aspiration with  nectar thickened barium, pudding consistency, or barium coated  cracker. Mild pharyngeal residue.  Other findings: Degenerative changes of the cervical spine.      Impression    Impression:   1. Flash penetration without aspiration of thin liquids.   2. Consistent delay of swallow initiation.    Please see the speech pathologist report for further details regarding  the modified barium swallow study portion of the examination.    I, SUNITA ADKINS MD, attest that I was present in the procedure room  for the entire procedure.    I have personally reviewed the examination and initial interpretation  and I agree with the findings.    SUNITA ADKINS MD

## 2021-02-25 NOTE — PROGRESS NOTES
SLP cancel: Attempted to see pt today however pt gone in dialysis this AM and is then scheduled to discharge to ARU today at 1400. If pt remains acute, speech therapy will follow up with pt tomorrow per POC.    Keri Bai, SLP

## 2021-02-25 NOTE — PLAN OF CARE
"NEURO: A&O x4  VITALS: Blood pressure 119/70, pulse 116, temperature 98.2  F (36.8  C), temperature source Oral, resp. rate 20, height 1.6 m (5' 2.99\"), weight 52.2 kg (115 lb), last menstrual period 06/07/2014, SpO2 98 %.  PAIN: Denies  CARDIAC: ST  RESPIRATORY: 3L NC with humidification, PSMV on majority of shift, shiley # 6 cuffless  GI: Bowel sounds active, no BM this shift, transitioned to oral diet and tolerated will, TF still running at 70 mL/hr  : Anuric  LDA: PIV x1, fistula, peg, shiley # 6  IV: Saline locked  ACTIVITY: Up in halls with SBA, walked with NST  GOALS: Discharge to TCU at 2pm tomorrow    "

## 2021-02-26 ENCOUNTER — APPOINTMENT (OUTPATIENT)
Dept: SPEECH THERAPY | Facility: CLINIC | Age: 61
End: 2021-02-26
Payer: COMMERCIAL

## 2021-02-26 ENCOUNTER — APPOINTMENT (OUTPATIENT)
Dept: OCCUPATIONAL THERAPY | Facility: CLINIC | Age: 61
End: 2021-02-26
Payer: COMMERCIAL

## 2021-02-26 ENCOUNTER — APPOINTMENT (OUTPATIENT)
Dept: PHYSICAL THERAPY | Facility: CLINIC | Age: 61
End: 2021-02-26
Payer: COMMERCIAL

## 2021-02-26 LAB
GLUCOSE BLDC GLUCOMTR-MCNC: 103 MG/DL (ref 70–99)
GLUCOSE BLDC GLUCOMTR-MCNC: 150 MG/DL (ref 70–99)
GLUCOSE BLDC GLUCOMTR-MCNC: 155 MG/DL (ref 70–99)
GLUCOSE BLDC GLUCOMTR-MCNC: 166 MG/DL (ref 70–99)
GLUCOSE BLDC GLUCOMTR-MCNC: 168 MG/DL (ref 70–99)
GLUCOSE BLDC GLUCOMTR-MCNC: 170 MG/DL (ref 70–99)
GLUCOSE BLDC GLUCOMTR-MCNC: 184 MG/DL (ref 70–99)
LACTATE BLD-SCNC: 1.1 MMOL/L (ref 0.7–2)

## 2021-02-26 PROCEDURE — 97110 THERAPEUTIC EXERCISES: CPT | Mod: GO | Performed by: OCCUPATIONAL THERAPIST

## 2021-02-26 PROCEDURE — 92610 EVALUATE SWALLOWING FUNCTION: CPT | Mod: GN | Performed by: SPEECH-LANGUAGE PATHOLOGIST

## 2021-02-26 PROCEDURE — 250N000013 HC RX MED GY IP 250 OP 250 PS 637: Performed by: PHYSICIAN ASSISTANT

## 2021-02-26 PROCEDURE — 97530 THERAPEUTIC ACTIVITIES: CPT | Mod: GO | Performed by: OCCUPATIONAL THERAPIST

## 2021-02-26 PROCEDURE — 97166 OT EVAL MOD COMPLEX 45 MIN: CPT | Mod: GO | Performed by: OCCUPATIONAL THERAPIST

## 2021-02-26 PROCEDURE — 94668 MNPJ CHEST WALL SBSQ: CPT

## 2021-02-26 PROCEDURE — 99207 PR CDG-CUT & PASTE-POTENTIAL IMPACT ON LEVEL: CPT | Performed by: PHYSICIAN ASSISTANT

## 2021-02-26 PROCEDURE — 99233 SBSQ HOSP IP/OBS HIGH 50: CPT | Performed by: PHYSICIAN ASSISTANT

## 2021-02-26 PROCEDURE — 999N001017 HC STATISTIC GLUCOSE BY METER IP

## 2021-02-26 PROCEDURE — 999N000157 HC STATISTIC RCP TIME EA 10 MIN

## 2021-02-26 PROCEDURE — 128N000003 HC R&B REHAB

## 2021-02-26 PROCEDURE — 97535 SELF CARE MNGMENT TRAINING: CPT | Mod: GO | Performed by: OCCUPATIONAL THERAPIST

## 2021-02-26 PROCEDURE — 92522 EVALUATE SPEECH PRODUCTION: CPT | Mod: GN | Performed by: SPEECH-LANGUAGE PATHOLOGIST

## 2021-02-26 PROCEDURE — 97162 PT EVAL MOD COMPLEX 30 MIN: CPT | Mod: GP | Performed by: PHYSICAL THERAPIST

## 2021-02-26 PROCEDURE — 99232 SBSQ HOSP IP/OBS MODERATE 35: CPT | Performed by: PHYSICAL MEDICINE & REHABILITATION

## 2021-02-26 PROCEDURE — 36415 COLL VENOUS BLD VENIPUNCTURE: CPT | Performed by: PHYSICAL MEDICINE & REHABILITATION

## 2021-02-26 PROCEDURE — 94667 MNPJ CHEST WALL 1ST: CPT

## 2021-02-26 PROCEDURE — 97530 THERAPEUTIC ACTIVITIES: CPT | Mod: GP | Performed by: PHYSICAL THERAPIST

## 2021-02-26 PROCEDURE — 250N000012 HC RX MED GY IP 250 OP 636 PS 637: Performed by: PHYSICIAN ASSISTANT

## 2021-02-26 PROCEDURE — 83605 ASSAY OF LACTIC ACID: CPT | Performed by: PHYSICAL MEDICINE & REHABILITATION

## 2021-02-26 PROCEDURE — 272N000078 HC NUTRITION PRODUCT INTERMEDIATE LITER

## 2021-02-26 PROCEDURE — 250N000009 HC RX 250: Performed by: PHYSICIAN ASSISTANT

## 2021-02-26 PROCEDURE — 94640 AIRWAY INHALATION TREATMENT: CPT | Mod: 76

## 2021-02-26 PROCEDURE — 94640 AIRWAY INHALATION TREATMENT: CPT

## 2021-02-26 RX ORDER — ACETYLCYSTEINE 100 MG/ML
2 SOLUTION ORAL; RESPIRATORY (INHALATION) 2 TIMES DAILY
Status: DISCONTINUED | OUTPATIENT
Start: 2021-02-26 | End: 2021-03-05 | Stop reason: HOSPADM

## 2021-02-26 RX ORDER — MAGNESIUM HYDROXIDE 1200 MG/15ML
LIQUID ORAL
Status: DISCONTINUED
Start: 2021-02-26 | End: 2021-02-27 | Stop reason: HOSPADM

## 2021-02-26 RX ORDER — METOPROLOL TARTRATE 25 MG/1
25 TABLET, FILM COATED ORAL 2 TIMES DAILY
Status: DISCONTINUED | OUTPATIENT
Start: 2021-02-26 | End: 2021-03-05 | Stop reason: HOSPADM

## 2021-02-26 RX ADMIN — INSULIN ASPART 2 UNITS: 100 INJECTION, SOLUTION INTRAVENOUS; SUBCUTANEOUS at 11:17

## 2021-02-26 RX ADMIN — ALBUTEROL SULFATE 2.5 MG: 2.5 SOLUTION RESPIRATORY (INHALATION) at 16:47

## 2021-02-26 RX ADMIN — POSACONAZOLE 300 MG: 100 TABLET, COATED ORAL at 08:17

## 2021-02-26 RX ADMIN — APIXABAN 2.5 MG: 2.5 TABLET, FILM COATED ORAL at 08:17

## 2021-02-26 RX ADMIN — PANTOPRAZOLE SODIUM 40 MG: 40 TABLET, DELAYED RELEASE ORAL at 08:18

## 2021-02-26 RX ADMIN — PREDNISONE 5 MG: 5 TABLET ORAL at 08:17

## 2021-02-26 RX ADMIN — TACROLIMUS 2.5 MG: 5 CAPSULE ORAL at 18:40

## 2021-02-26 RX ADMIN — POSACONAZOLE 300 MG: 100 TABLET, COATED ORAL at 15:38

## 2021-02-26 RX ADMIN — INSULIN ASPART 2 UNITS: 100 INJECTION, SOLUTION INTRAVENOUS; SUBCUTANEOUS at 07:19

## 2021-02-26 RX ADMIN — INSULIN ASPART 1 UNITS: 100 INJECTION, SOLUTION INTRAVENOUS; SUBCUTANEOUS at 03:25

## 2021-02-26 RX ADMIN — Medication 1 PACKET: at 21:44

## 2021-02-26 RX ADMIN — METOPROLOL TARTRATE 25 MG: 25 TABLET, FILM COATED ORAL at 21:41

## 2021-02-26 RX ADMIN — ACETYLCYSTEINE 2 ML: 100 INHALANT RESPIRATORY (INHALATION) at 20:01

## 2021-02-26 RX ADMIN — INSULIN ASPART 2 UNITS: 100 INJECTION, SOLUTION INTRAVENOUS; SUBCUTANEOUS at 18:35

## 2021-02-26 RX ADMIN — SULFAMETHOXAZOLE AND TRIMETHOPRIM 80 MG: 200; 40 SUSPENSION ORAL at 08:18

## 2021-02-26 RX ADMIN — ALBUTEROL SULFATE 2.5 MG: 2.5 SOLUTION RESPIRATORY (INHALATION) at 13:05

## 2021-02-26 RX ADMIN — OLANZAPINE 10 MG: 5 TABLET, FILM COATED ORAL at 21:41

## 2021-02-26 RX ADMIN — INSULIN GLARGINE 10 UNITS: 100 INJECTION, SOLUTION SUBCUTANEOUS at 08:19

## 2021-02-26 RX ADMIN — Medication 1 PACKET: at 08:26

## 2021-02-26 RX ADMIN — PREDNISONE 2.5 MG: 2.5 TABLET ORAL at 21:42

## 2021-02-26 RX ADMIN — BUDESONIDE 0.5 MG: 0.5 INHALANT RESPIRATORY (INHALATION) at 20:01

## 2021-02-26 RX ADMIN — ALBUTEROL SULFATE 2.5 MG: 2.5 SOLUTION RESPIRATORY (INHALATION) at 20:01

## 2021-02-26 RX ADMIN — BUDESONIDE 0.5 MG: 0.5 INHALANT RESPIRATORY (INHALATION) at 08:10

## 2021-02-26 RX ADMIN — TACROLIMUS 2 MG: 5 CAPSULE ORAL at 08:17

## 2021-02-26 RX ADMIN — ALBUTEROL SULFATE 2.5 MG: 2.5 SOLUTION RESPIRATORY (INHALATION) at 08:10

## 2021-02-26 RX ADMIN — Medication 5 ML: at 08:19

## 2021-02-26 RX ADMIN — ACETYLCYSTEINE 4 ML: 100 INHALANT RESPIRATORY (INHALATION) at 08:10

## 2021-02-26 RX ADMIN — POSACONAZOLE 300 MG: 100 TABLET, COATED ORAL at 19:30

## 2021-02-26 ASSESSMENT — ACTIVITIES OF DAILY LIVING (ADL): PREVIOUS_RESPONSIBILITIES: MEAL PREP;HOUSEKEEPING;MEDICATION MANAGEMENT;FINANCES;DRIVING

## 2021-02-26 NOTE — PLAN OF CARE
"Discharge Planner Post-Acute Rehab PT:     Discharge Plan: Home with family and outpatient PT. ELOS 7 days (3/5/21 discharge).    Precautions: Falls, supplemental O2/monitor SpO2  Dialysis T/Th/Sat    Current Status:  Bed Mobility: IND  Transfer: CGA with FWW  Gait: Up to 200 ft with FWW and CGA  Stairs: 8x6\" steps with bilat HR and CGA, step-to pattern  Balance: Fair to good static without UE support. Requires at least single UE support for dynamic standing balance.    Assessment:  Pt highly motivated for therapies. Anticipate approx. 1 week stay to ensure safe and successful discharge to home with family, mod I. Pt currently on 2 L/min NC O2: remains  >= 95% with ambulation, but decreases to as low as 84% with stairs per above, increased to >= 90% within 10 sec of seated rest. Denies dizziness or lightheadedness, only SOB.    Other Barriers to Discharge (DME, Family Training, etc): Pt has 4WW available for use.    "

## 2021-02-26 NOTE — PROGRESS NOTES
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 She was admitted to [facility] 1/4-2/25/21 for acute hypoxic respiratory failure found to have PJP pneumonia w/ stay c/b ARDS requiring tracheostomy, PEG/J, and dilation of right main bronchus as well as a fib w/ RVR. Transferred to ARU for ongoing rehabilitation. IM following d/t transplant hx.     #History of Lung Transplant.  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 On 2/19/21 had right mainstem bronchus dilation. Recent acute events as below.   - Continue immunosuppression w/ tacrolimus 2 mg AM/2.5 mg PM via G tube only (goal 8-10; checked Q M/Th) and prednisone 5 mg AM/2.5 mg PM (no further taper planned at this time).   - Continue prophylaxis w/ Bactrim Q MWF.   - CMP, CBC, Mg, phos, tacro level Q M/Th and CXR Q Monday.   - Pulmonology to follow here.   - Will have repeat bronch scheduled ~6 weeks (first week of April - TBD).      #Acute Hypoxemic Respiratory Failure s/p Tracheostomy.   #PJP Pneumonia.   Presented in respiratory failure and ultimately failed extubation trials x2 and developed ARDS. Required proning, inhaled epoprostenol, pressors. Tracheostomy done 2/12 and exchanged for #6 cuffless Shiley on 2/22. Completed steroid taper 2/17 and 21 d course of Bactrim 2/15. Was most recently transitioned from continuous trach dome to humidified 2-4L NC w/ trach dome overnight.   - Continue to wean supplemental O2 as able. This should be humidified when in use. Discussed w/ RN and order  placed. TD can be used PRN for additional humidity if needed for secretions.   - Trach to be covered w/ PMSV during the day and HME overnight. Pulm will follow to assist w/ trach weaning here.   - For now continue nebs (QID albuterol, BID Mucomyst, BID Pulmicort).      #History of Left Sided Aspergillus Empyema. Diagnosed 10/2019 s/p amphotericin beads and is on indefinite posaconazole. Last level 2/25 still very low but transitioned from suspension/feeding tube to PO dosing that should be taken w/ food.   - Continue posaconazole 300 mg TID PO.   - Next posaconazole level due 3/2.   - Will need OP ID f/u.      #History of CMV and EBV Viremias. Last CMV PCR 1/25 was <137, level from 2/25 still pending. EBV level 12/9/20 mildly elevated to 10K (log 4) from prior 3K (3.5 log) on 9/9/20, repeat (1/25/21) decreased to 5619 copies (log of 3.8), but increased on 2/22/21 to 75,709 (log of 4.9). This increase is likely secondary to acute illness and recent steroid burst.   - Q2week CMV PCR for now since recently stopping VGCV prophylactic course (next due 3/11).  - Next EBV PCR due 3/22.      #ESRD. Currently on HD Tu/Th/Sat (was biweekly prior to hospitalization) via AVF w/ partial graft that can be challenging to access. Had CRRT 2/5-2/8 d/t hemodynamic instability, otherwise has tolerated iHD (temp line removed 2/9).   - Nephrology following for TTS HD.   - Has required occasional midodrine during HD.   - Ok for just low K diet rather than full renal diet to encourage PO intake.      #PAF. In setting of lung disease and critical illness. Short self limited runs in the hospital. Last echo 1/25/21 w/ normal LVEF and RV size/function, high RA pressure. CHADSVasc 1 for gender corresponding to 1.3% annual stroke risk indicating no need for long term oral anticoagulation (per 2/22 EP note), but is on Eliquis. NSR on exam.   - Needs OP Ziopatch arranged at ARU discharge.   - Metoprolol decreased from 50 to 25 mg BID today d/t AM  "BP 88/51 (was asymptomatic and improved to 110/59 w/ holding AM dose). May need further titration.   - On Eliquis. Per cards note this is for prior DVT, but has not had known DVT since LUE line associated clot in 2018 after transplant. Per IM and pulm notes this is for a fib. Paged pulmonary transplant team and they will get back to us if she is able to stop this.     Addendum: Pulmonology [place] NP and staff Dr. Christensen have confirmed that patient does not need to be on anticoagulation since cardiology does not feel it is indicated for a fib and she was not taking it PTA. Patient is in agreement and pleased to be stopping this (Eliquis order discontinued and discussed w/ pharmacist). Page also out to PM&R to notify them in case they want any sort of DVT prophylaxis based on her mobility.      #Anxiety. On high dose 10 mg HS Zyprexa. May be able to wean this while here.      #Leukopenia. Gradual decrease in WBC during hospitalization, likely d/t treatment-dosed Bactrim for PJP. Was also on VGCV for CMV ppx with steroid burst. Bactrim now back to ppx dosing, VGCV course completed 2/23. Anticipate gradual improvement over the coming weeks. WBC 2.9.   - 2x weekly CBC.      #Steroid Induced Hyperglycemia. HgbA1c 6% on 1/24/21. Is on cycled TFs overnight and now taking PO. Steroid taper was completed 2/17. BG controlled on Lantus 10 units and \"high\" Novolog correction scale Q4hr.   - Continue Lantus 10 units daily.   - Continue Novolog sliding scale but changed from Q4hr to AC/HS.     IM will follow.     Ethel Hernadez PA-C  Hospitalist Service  Pager: [phone]  ________________________________________________________________    Subjective & Interval History:  Doing ok today. BP low this AM but did not feel dizzy and was normal while working with therapy. No new cough or dyspnea. No bowel or bladder complaints.     Last 24 hour care team notes reviewed.   ROS: 4 point ROS (including Respiratory, CV, GI and ) was " "performed and negative unless otherwise noted in HPI.     Medications: Reviewed in EPIC.    Physical Exam:    Blood pressure 110/59, pulse 100, temperature 98.3  F (36.8  C), temperature source Oral, resp. rate 18, height 1.6 m (5' 3\"), weight 49.8 kg (109 lb 12.8 oz), last menstrual period 06/07/2014, SpO2 98 %.    GENERAL: Alert and oriented x 3. Sitting up in chair, appears comfortable. Pleasant and conversant.   HEENT: Anicteric sclera. Mucous membranes moist.   CV: RRR. S1, S2. No murmurs appreciated.   RESPIRATORY: Effort normal on 2L NC. Lungs CTAB with no wheezing, rales, rhonchi.   GI: Abdomen soft, non distended, non tender.   NEUROLOGICAL: No focal deficits. Moves all extremities.    EXTREMITIES: No peripheral edema. Warm and well perfused.   SKIN: No jaundice. No rashes.                       "

## 2021-02-26 NOTE — PROGRESS NOTES
"   02/26/21 1700   Quick Adds   Type of Visit Initial PT Evaluation       Present no   Language English   Living Environment   People in home spouse   Current Living Arrangements house   Home Accessibility no concerns   Transportation Anticipated family or friend will provide   Living Environment Comments [place], [place]. Ramp to enter. Bed/bath/living all on first floor. Would like to be able to access second floor and basement for additional rec spaces. Has 4WW available at home from lung tranplant surgery in 2018.   Self-Care   Usual Activity Tolerance moderate   Current Activity Tolerance fair   Regular Exercise No   Equipment Currently Used at Home none   Disability/Function   Hearing Difficulty or Deaf no   Wear Glasses or Blind yes   Vision Management glasses   Concentrating, Remembering or Making Decisions Difficulty no   Difficulty Communicating no   Difficulty Eating/Swallowing no   Walking or Climbing Stairs Difficulty yes   Walking or Climbing Stairs   (Usually step-to with bilat HR)   Dressing/Bathing Difficulty no   Toileting issues no   Doing Errands Independently Difficulty (such as shopping) no   Fall history within last six months no   Change in Functional Status Since Onset of Current Illness/Injury yes   General Information   Onset of Illness/Injury or Date of Surgery 01/22/21   Referring Physician Shree Ford MD   Patient/Family Therapy Goals Statement (PT) \"To get stronger, especially my legs\"   Pertinent History of Current Problem (include personal factors and/or comorbidities that impact the POC) \"Kecia Blue is a 60 year old woman with past medical history of ILD with antisynthetase syndrome s/p lung transplant 2018 complicated by left mainstem bronchial stenosis, left sided aspergillus empyema, CMV viremia, EBV viremia, ESRD on HD, osteopenia, seronegative Rheumatoid Arthritis, who presented to outside hospital with acute hypoxic " "respiratory failure in context of pneumonia intubated 1/24 and transferred to [facility] that day. She was extuabted 1/26/21, course was complicated by ARDS requiring requiring reintubation, proning, in context of ongoing PJP pneumonia.  She underwent trach placement 2/12/21 and PEG-J 2/16/21.  Underwent dilation of right main stem bronchus 2/19/21.\"   Existing Precautions/Restrictions fall  (contact, supplemental O2)   Weight-Bearing Status - LUE full weight-bearing   Weight-Bearing Status - RUE full weight-bearing   Weight-Bearing Status - LLE full weight-bearing   Weight-Bearing Status - RLE full weight-bearing   Heart Disease Risk Factors Lack of physical activity   General Observations Highly motivated   Cognition   Orientation Status (Cognition) oriented x 4   Affect/Mental Status (Cognition) WNL   Follows Commands (Cognition) WNL   Cognitive Status Comments All WNL   Pain Assessment   Patient Currently in Pain No   Integumentary/Edema   Integumentary/Edema no deficits were identifed   Posture    Posture Forward head position;Protracted shoulders;Kyphosis   Range of Motion (ROM)   ROM Comment Grossly WFL t/o bilat LE   Strength Comprehensive (MMT)   Comment, General Manual Muscle Testing (MMT) Assessment Grossly 4-/5 t/o bilat LE   ARC Assessment Only   Acute Rehab Functional Assessment See IP Rehab Daily Documentation Flowsheet for Functional Mobility/ADL Assessment   Sensory Examination   Sensory Perception Comments Intact to light touch t/o bilat LE   Coordination   Coordination no deficits were identified   Coordination Comments as assessed through alternate toe tapping   Clinical Impression   Criteria for Skilled Therapeutic Intervention yes, treatment indicated   PT Diagnosis (PT) decreased strength/balance/CV endurance s/p pneumonia with ARDS   Influenced by the following impairments decreased strength/balance/CV endurance   Functional limitations due to impairments impaired transfers, ambulation, stairs "   Clinical Presentation Evolving/Changing   Clinical Presentation Rationale Continued need for supplemental O2 and monitoring t/o therapy. Trach and PEG tube present.   Clinical Decision Making (Complexity) moderate complexity   Therapy Frequency (PT) Daily   Predicted Duration of Therapy Intervention (days/wks) 7 days   Planned Therapy Interventions (PT) balance training;gait training;home exercise program;manual therapy techniques;neuromuscular re-education;patient/family education;postural re-education;stair training;strengthening;stretching;transfer training   Anticipated Equipment Needs at Discharge (PT)   (Has 4WW available at home.)   Risk & Benefits of therapy have been explained care plan/treatment goals reviewed;evaluation/treatment results reviewed;participants voiced agreement with care plan;participants included;patient   Clinical Impression Comments Pt should benefit well from intensive skilled PT during ARU to address strength, balance, and CV endurance impairments s/p pneumonia with ARDS s/p intubation. Expect patient to be able to discharge to home with family, mod I, with transition to OP PT.   PT Discharge Planning    PT Discharge Recommendation (DC Rec) home with outpatient physical therapy   PT Rationale for DC Rec Pt has ramp to enter house and lives on main floor. Supportive family to provide transportation to OP therapy.   PT Brief overview of current status  Pt highly motivated. Generally CGA for transfers, ambulation with FWW, and step-to ascending/descending stairs. Mod A for car transfer due to low height surface.   Total Evaluation Time   Total Evaluation Time (Minutes) 20

## 2021-02-26 NOTE — PROGRESS NOTES
"   02/26/21 0900   Quick Adds   Type of Visit Initial Occupational Therapy Evaluation       Present no   Language English   Living Environment   People in home spouse   Current Living Arrangements house   Home Accessibility no concerns   Transportation Anticipated family or friend will provide   Living Environment Comments OT: Pt lives with spouse in 1 level home, does not need to access basement. There is a ramp to enter home, pt typically stays on one floor without needing to do stairs. Bathroom set up: walk in shower with chair. Pt has good family support, available to assist at needed.   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 Pt unable to do stairs at baseline, was not exercising prior to admission, but IND with all self cares and mobility.    Disability/Function   Hearing Difficulty or Deaf no   Wear Glasses or Blind yes   Vision Management glasses   Concentrating, Remembering or Making Decisions Difficulty no   Difficulty Communicating no   Difficulty Eating/Swallowing no   Walking or Climbing Stairs Difficulty yes   Mobility Management Difficulty with stairs at baseline, stays on one level of home.    Dressing/Bathing Difficulty no   Toileting issues no   Doing Errands Independently Difficulty (such as shopping) no   Fall history within last six months no   Change in Functional Status Since Onset of Current Illness/Injury yes   General Information   Onset of Illness/Injury or Date of Surgery 01/24/21   Referring Physician Dr. Ford    Patient/Family Therapy Goal Statement (OT) To be able to get on and off the toilet IND and do things for herself and feel safe at home.    Additional Occupational Profile Info/Pertinent History of Current Problem Kecia Blue is a 60 year old woman with past " medical history of ILD with antisynthetase syndrome s/p lung transplant 2018 complicated by left mainstem bronchial stenosis, left sided aspergillus empyema, CMV viremia, EBV viremia, ESRD on HD, osteopenia, seronegative Rheumatoid Arthritis, who presented to outside hospital with acute hypoxic respiratory failure in context of pneumonia.    Performance Patterns (Routines, Roles, Habits) Prior to admission pt IND with self cares, assistance from family to complete heavier IADL.    Existing Precautions/Restrictions oxygen therapy device and L/min;other (see comments)  (tracheostomy and JPEG)   Limitations/Impairments other (see comments)   Left Upper Extremity (Weight-bearing Status) full weight-bearing (FWB)   Right Upper Extremity (Weight-bearing Status) full weight-bearing (FWB)   Left Lower Extremity (Weight-bearing Status) full weight-bearing (FWB)   Right Lower Extremity (Weight-bearing Status) full weight-bearing (FWB)   General Observations and Info Pt blood pressure low initially while lying in bed, but went up after getting out of bed and walking.    Cognitive Status Examination   Orientation Status orientation to person, place and time   Affect/Mental Status (Cognitive) WNL   Follows Commands WNL   Cognitive Status Comments OT: Pt appearing cognitively intact, recalling past events and information, confirmed by spouse.    Visual Perception   Visual Impairment/Limitations WNL;WFL   Impact of Vision Impairment on Function (Vision) No visual deficits identified.   Sensory   Sensory Quick Adds No deficits were identified   Sensory Comments Pt reported no sensory changes or concerns.    Pain Assessment   Patient Currently in Pain No   Integumentary/Edema   Integumentary/Edema no deficits were identifed   Integumentary/Edema Comments Trach site and GI tube site, no concerns currently.    Posture   Posture not impaired   Posture Comments Pt posture good while seated and standing.    Range of Motion Comprehensive    General Range of Motion no range of motion deficits identified   Comment, General Range of Motion ROM within functional limits, pt did not identify concens with ROM   Strength Comprehensive (MMT)   General Manual Muscle Testing (MMT) Assessment upper extremity strength deficits identified   Comment, General Manual Muscle Testing (MMT) Assessment Overall weakness and deconditioning.   Upper Extremity (Manual Muscle Testing)   Upper Extremity: Manual Muscle Testing (MMT) left UE strength is WFL;right UE strength is WFL;other (see comments)   Comment, MMT: Upper Extremity 4-/5 muscle strength in UE.   Muscle Tone Assessment   Muscle Tone Quick Adds Bilateral upper extremities;Bilateral lower extremities   Muscle Tone Assessment - Bilateral Upper Extremities mildly decreased tone   Muscle Tone Assessment - Bilateral Lower Extremities mildly decreased tone   Muscle Tone Comments Decreased muscle tone overall due to extended hospital stay and lack of physical activity.   Coordination   Upper Extremity Coordination No deficits were identified   Gross Motor Coordination WFL   Fine Motor Coordination WFL   Coordination Comments Pt coordination WFL   ARC Assessment Only   Acute Rehab Functional Assessment See IP Rehab Daily Documentation Flowsheet for Functional Mobility/ADL Assessment   Balance   Balance Assessment sitting balance: static;sitting balance: dynamic   Sitting Balance: Static WFL   Sitting Balance: Dynamic WFL   Balance Comments Good unsupported static sitting, good balance with dynamic and static standing.   Instrumental Activities of Daily Living (IADL)   Previous Responsibilities meal prep;housekeeping;medication management;finances;driving   IADL Comments Pt family assisting with laundry and heavier IADL at baseline.    Clinical Impression   Criteria for Skilled Therapeutic Interventions Met (OT) yes   OT Diagnosis Pt decreased ADL/IADL performance compared to baseline due to overall deconditioning.   OT  Problem List-Impairments impacting ADL activity tolerance impaired;balance;mobility;motor control;muscle tone;strength   ADL comments/analysis Below baseline for ADL/IADL.   Assessment of Occupational Performance 3-5 Performance Deficits   Identified Performance Deficits meal prep, toileting, bathing, driving   Planned Therapy Interventions (OT) ADL retraining;IADL retraining;strengthening;stretching;home program guidelines;progressive activity/exercise   Intervention Comments Pt motivated for IND with self cares, will focus on increasing functional activity tolerance.   Clinical Decision Making Complexity (OT) moderate complexity   Therapy Frequency (OT) Daily   Predicted Duration of Therapy 7-10   Risk & Benefits of therapy have been explained care plan/treatment goals reviewed;current/potential barriers reviewed;patient;spouse/significant other   Comment-Clinical Impression Pt would benefit from skilled OT to address ADL/IADL performance deficits and address goals in POC to maximize IND and safety in discharge environment.    Total Evaluation Time (Minutes)   Total Evaluation Time (Minutes) 30

## 2021-02-26 NOTE — PLAN OF CARE
FOCUS/GOAL  Medical management and Mobility    ASSESSMENT, INTERVENTIONS AND CONTINUING PLAN FOR GOAL:  CGA with walker/gaitbelt for transfers. Continent of B/B today using toilet in bathroom, per NA report pt had x2 loose BMs. Independent with pericare and min assist needed for clothing management. BP was 98/56 at start of shift, prior to med administration was 88/51; pt asymptomatic. Metoprolol held per order parameters, and Dr Carlisle notified with new orders received (see MAR). OT reported BPs of 87/50 and 110/59, respectively, during their session. New dietary orders noted today, with changes made to cycled TF orders (see active orders). BGs 184 and 103 today, sliding scale given as ordered. Trach care done at end of shift by Sita GIL, pt wore speaking valve throughout this shift. Pt's  here today and very supportive of pt. Will continue with POC.

## 2021-02-26 NOTE — PLAN OF CARE
A&O x4. Pt denied pain, nausea, or SOB overnight. Pt continued on 2L O2 with humidification overnight. Pt voids very little as on hemodialysis. Pt glucose monitored q4 (see Results Review) and insulin sliding scale administered per orders (see MAR.) Pt continued on tube feeding overnight. Pt slept majority of shift. Call light within reach and pt able to make needs known.

## 2021-02-26 NOTE — PLAN OF CARE
FOCUS/GOAL  Bowel management, Bladder management, Pain management, Mobility, Skin integrity, and Safety management    ASSESSMENT, INTERVENTIONS AND CONTINUING PLAN FOR GOAL:  A/O x4, soft bps this shift and tachycardic, due to this patient flagged sepsis protocol, lactic was 0.9.  oxygen saturation is % on 4L, titrated down to 2L NC over the course of the shift still with saturations in the high 90's.  Trach has speaking valve on and HME cap placed on at HS.  Up w/ CG and gait belt to BSC.  Continent of bowel and bladder, last BM 2/24.  Patient on hemodialysis tues, Thursday Saturday, had a run prior to arrival today.  Fistula to left arm, intact wnl.  Due to HD patient reports she doesn't void much.  One pvr done and negative.  Renal regular thin diet, takes her pills well whole w/ pudding.  Cycled tube feeds through PEG tube, started at 1700 at 70mls/hr and should run until 11am.  Supposed to be ordered from 1600 until 10am but it got started late today.  bgs this shift were 90 and , covered per orders w/ sliding scale insulin.  No c/o pain, no prn meds given. Cont w/ POC.

## 2021-02-26 NOTE — PHARMACY-ADMISSION MEDICATION HISTORY
Pharmacy Medication Regimen Review  Kecia Blue is a 60 year old female who is currently in the Acute Rehab Unit.    Assessment: All medications have an appropriate indications, durations and no unnecessary use was found    Plan:   1. Will work with ID to monitor posaconazole levels.  Levels being following by [facility].   2. Will monitor tacrolimus levels    Attending provider will be sent this note for review.  If there are any emergent issues noted above, pharmacist will contact provider directly by phone.      Pharmacy will periodically review the resident's medication regimen for any PRN medications not administered in > 72 hours and discontinue them. The pharmacist will discuss gradual dose reductions of psychopharmacologic medications with interdisciplinary team on a regular basis.    Please contact pharmacy if the above does not answer specific medication questions/concerns.    Background:  A pharmacist has reviewed all medications and pertinent medical history today.  Medications were reviewed for appropriate use and any irregularities found are listed with recommendations.      Current Facility-Administered Medications:      acetaminophen (TYLENOL) tablet 325 mg, 325 mg, Oral, Q4H PRN, Ethel Hernadez PA     acetylcysteine (MUCOMYST) 10 % nebulizer solution 2 mL, 2 mL, Inhalation, BID, Ethel Hernadez PA     albuterol (PROVENTIL) neb solution 2.5 mg, 2.5 mg, Nebulization, 4x Daily, Ethel Hernadez PA, 2.5 mg at 02/26/21 0810     apixaban ANTICOAGULANT (ELIQUIS) tablet 2.5 mg, 2.5 mg, Oral, BID, Ethel Hernadez PA, 2.5 mg at 02/26/21 0817     B and C vitamin Complex with folic acid (NEPHRONEX) liquid 5 mL, 5 mL, Oral, Daily, Ethel Hernadez PA, 5 mL at 02/26/21 0819     budesonide (PULMICORT) neb solution 0.5 mg, 0.5 mg, Nebulization, BID, Ethel Hernadez PA, 0.5 mg at 02/26/21 0810     dextrose 10% infusion, , Intravenous, Continuous PRN, Carin Piper PA      glucose gel 15-30 g, 15-30 g, Oral, Q15 Min PRN **OR** dextrose 50 % injection 25-50 mL, 25-50 mL, Intravenous, Q15 Min PRN **OR** glucagon injection 1 mg, 1 mg, Subcutaneous, Q15 Min PRN, Ethel Hernadez PA     fiber modular (NUTRISOURCE FIBER) packet 1 packet, 1 packet, Per Feeding Tube, BID, Ethel Hernadez PA, 1 packet at 02/26/21 0826     insulin aspart (NovoLOG) injection (RAPID ACTING), 1-12 Units, Subcutaneous, Q4H, Ethel Hernadez PA, 2 Units at 02/26/21 1117     insulin glargine (LANTUS PEN) injection 10 Units, 10 Units, Subcutaneous, QAM AC, Ethel Hernadez PA, 10 Units at 02/26/21 0819     metoprolol tartrate (LOPRESSOR) tablet 25 mg, 25 mg, Oral or Feeding Tube, BID, Ethel Hernadez PA     OLANZapine (zyPREXA) tablet 10 mg, 10 mg, Oral or Feeding Tube, At Bedtime, Ethel Hernadez PA, 10 mg at 02/25/21 2104     pantoprazole (PROTONIX) 2 mg/mL suspension 40 mg, 40 [facility], [facility], [facility], Ethel Hern[facility]z [facility], 40 mg at 02/26/21 0818     Patient is already receiving anticoagulation with heparin, enoxaparin (LOVENOX), warfarin (COUMADIN)  or other anticoagulant medication, , Does not apply, Continuous PRN, Carin Piper PA     posaconazole (NOXAFIL) EC tablet TBEC 300 mg, 300 mg, Oral, TID, Ethel Hernadez PA, 300 mg at 02/26/21 0817     predniSONE (DELTASONE) tablet 2.5 mg, 2.5 mg, Oral, QPM, Ethel Hernadez PA, 2.5 mg at 02/25/21 2104     predniSONE (DELTASONE) tablet 5 mg, 5 mg, Oral, Daily, Ethel Hernadez PA, 5 mg at 02/26/21 0817     protein modular (PROSOURCE TF) 1 packet, 1 packet, Per Feeding Tube, BID, Ethel Hernadez PA, 1 packet at 02/26/21 0826     sulfamethoxazole-trimethoprim (BACTRIM/SEPTRA) suspension 80 mg, 10 mL, Oral or Feeding Tube, Once per day on Mon Wed Fri, Ethel Hernadez PA, 80 mg at 02/26/21 0818     tacrolimus (GENERIC EQUIVALENT) suspension 2 mg, 2 mg, Oral, QAM, Ethel Hernadez PA, 2 mg at 02/26/21  0817     tacrolimus (GENERIC EQUIVALENT) suspension 2.5 mg, 2.5 mg, Oral, QPM, Ethel Hernadez PA, 2.5 mg at 02/25/21 1821  No current outpatient prescriptions on file.   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

## 2021-02-26 NOTE — PLAN OF CARE
Discharge Planner Post-Acute Rehab OT:     Discharge Plan: Home with spouse support, OP OT    Precautions: falls, tracheostomy, JPEG, O2 nasal canula    Current Status:  ADLs: SBA TB dressing while seated EOB after set up, Min A STS from standard toilet seat, SBA-CGA during STS from higher surfaces, SBA oral cares while standing EOS with fww, SBA g/h seated EOB and standing EOS.   IADLs: Not formally assessed, family providing assistance with heavier IADL at baseline.   Vision/Cognition: Wears glasses, no concerns regarding vision or cognition at this time.     Assessment: Initial evaluation completed and POC created. Pt would benefit from skilled occupational therapy to address decreased ADL/IADL performance. Pt limited by overall deconditioning and weakness following extended hospital stay recovering from pneumonia. Currently on 2L O2 through nasal canula.  Pt goals to progress to Mod I with fww. Pt wants to be able to increase IND with ADL and feel safe going home.     Other Barriers to Discharge (DME, Family Training, etc): Equipment: pt has a walker and shower chair at home. Pt spouse is designated visitor, very supportive, family training will be beneficial prior to discharge.

## 2021-02-26 NOTE — PROGRESS NOTES
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 She was extuabted 1/26/21, course was complicated by ARDS requiring requiring reintubation, proning, in context of ongoing PJP pneumonia.  She underwent trach placement 2/12/21 and PEG-J 2/16/21.  Underwent dilation of right main stem bronchus 2/19/21. Pt's trach was downsized to a Shiley #6 cuffless - pt had VFSS on 2/24/21- placed on a regular die with thin liquids- tolerating well; pt completely independent with use /wearing of PMSV and donning and doffing- tolerating  and wearing throughout the day    General Observations pleasant and cooperative    Past History of Dysphagia none   Type of Evaluation   Type of Evaluation Swallow Evaluation   Oral Motor   Oral Musculature generally intact   Dentition (Oral Motor)   Dentition (Oral Motor) natural dentition;adequate dentition   Facial Symmetry (Oral Motor)   Facial Symmetry (Oral Motor) WNL   Lip Function (Oral Motor)   Lip Range of Motion (Oral Motor) WNL   Tongue Function (Oral Motor)   Tongue ROM (Oral Motor) WNL   Jaw Function (Oral Motor)   Jaw Function (Oral Motor) WNL   Vocal Quality/Secretion Management (Oral Motor)   Vocal Quality (Oral Motor) WFL;other (see comments)   Comment, Vocal Quality/Secretion Management (Oral Motor) WFL with use of  PMSV   General Swallowing Observations   Current Diet/Method of Nutritional Intake (General Swallowing Observations, NIS) thin liquids;regular diet   Respiratory Support (General Swallowing Observations) nasal cannula  (2 liters via nasal cannula)   Swallowing Evaluation Clinical swallow evaluation   Clinical Swallow Evaluation   Feeding Assistance no assistance needed   Clinical Swallow Evaluation Textures Trialed Thin Liquids;Solid Foods   Clinical Swallow Eval: Thin Liquid Texture Trial   Mode of Presentation, Thin Liquids cup;straw;self-fed   Volume of Liquid or Food Presented 4 oz   Oral Phase of Swallow WFL   Pharyngeal Phase of Swallow intact   Diagnostic Statement No aspiration s/s with sips by cup rim or by straw with PMSV in place; swallow appears more timely and improved laryngeal elevation    Clinical Swallow Evaluation: Solid Food Texture Trial   Mode of Presentation, Solid self-fed   Volume of Solid Food Presented small bites of regular solids   Oral Phase, Solid WFL   Pharyngeal Phase, Solid intact   Successful Strategies Trialed During Procedure, Solid hard swallow;other (see comments)  (alternate solids and liquids as needed)   Diagnostic Statement There were no s/s of aspiration with regular solids and no sensation of pharyngeal retention. On recent VFSS on 2/24- it indicated mild occasional phayrngeal residue that cleared with a 2nd swallow- pt noted to occasionaly use a second swallow and alternated with sips of liquids as needed.    Esophageal Phase of Swallow   Patient reports or presents with symptoms of esophageal dysphagia No   Swallowing Recommendations   Diet Consistency Recommendations thin liquids;regular diet   Supervision Level for Intake patient independent   Mode of Delivery Recommendations bolus size, small   Swallowing Maneuver Recommendations alternate food and liquid intake;extra swallow   Recommended Feeding/Eating Techniques (Swallow Eval) patient is independent, no specific  recommendations   Medication Administration Recommendations, Swallowing (SLP) ok for pt to take meds whole with water as tolerated   Comment, Swallowing Recommendations functional oral pharyngeal swallow   General Therapy Interventions   Planned Therapy Interventions Dysphagia Treatment   Dysphagia treatment Instruction of safe swallow strategies   SLP Therapy Assessment/Plan   Criteria for Skilled Therapeutic Interventions Met (SLP Eval) yes;treatment indicated   SLP Diagnosis functional oral pharyngeal swallow- minimal difficulty    Rehab Potential (SLP Eval) good, to achieve stated therapy goals   Therapy Frequency (SLP Eval) 2 times/wk   Predicted Duration of Therapy Intervention (SLP Eval) 1x meal follow-up   Comment, Therapy Assessment/Plan (SLP) Completed clinical swallow eval per MD orders. Pt presents with functional oral pharyngeal swallow as evidenced previously by VFSS completed on 2/24 and also with gita clinical swallow eval. Pt demonstrates toleraance of regular solids and thin liquids with PMSV in place - there were no aspiration s/s with multiple trials of thin liquids by cup rim or by straw or with regular solids. There also was no sensation of pharyngeal retention with regular solids. Pt's swallow is noted to be timely today and with improve laryngeal elevation. Per VFSS on 2/24- pt had some occasional mild pharyngeal residue that cleared with a 2nd swallow. Pt noted to intermittently use 2nd swallow and also to alternate with sip so fliquids. Pt has a # 6 Shiley cuffless trach and was wearing the PMSV during swallow eval-- tolerates PMSV throguhout the day independently. Recommend that pt continue with current regular diet with thinl iquids; pt should continue to wear PMSV with all po intake; sit upright for all po, small bites/sips, alternate solids and liquids, double swallow as needed; pace self - eat slowly; ok for straws and ok to take pills whole with water-- pt has been tolerating with  this. SLP to follow-up 1x at meal for diet tolerance and if still tolerating regular solids and liquids then will d/c swallow goal     Total Evaluation Time   Total Evaluation Time (Minutes) 20

## 2021-02-26 NOTE — CONSULTS
21 1600   Living Arrangements   People in home spouse   Able to Return to Prior Arrangements yes   Home Safety   Patient Feels Safe Living in Home? yes   CM/SW Discharge Planning   Expected Discharge Date 21   Patient/Family Anticipates Transition to home with family   Concerns to be Addressed denies needs/concerns at this time;no discharge needs identified   Patient/family educated on Medicare website which has current facility and service quality ratings no   Transportation Anticipated family or friend will provide   Anticipated Discharge Disposition (CM/SW) Home Care;Home;Outpatient Rehab (PT, OT, SLP, Cardiac or Pulmonary)  (Resistant to HC due to COVID, may prefer OP )   Patient/Family in Agreement with Plan yes     Social Work: Initial Assessment with Discharge Plan    Patient Name: Kecia Blue  : 1960  Age: 60 year old  MRN: [id number]  Completed assessment with: Chart review and pt interview at bedside   Admitted to ARU: 2021    Presenting Information   Date of SW assessment: 2021  Health Care Directive: Copy in Chart  Primary Health Care Agent: Patient/self   Secondary Health Care Agent: Spouse and adult children   Living Situation: House in [place], [place] with spouse Ranjan. Home has ramp enterance and patient is able to stay on main level.  Previous Functional Status: Per H&P--Patient with frequent/recurrent illness since transplant with limited activity and limited activity tolerance. Unable to navigate stairs (says she could do this with difficulty but could typically avoid stairs in her home), ambulates ~ 50 feet prior to noting fatigue. States she was independent with all ADLs and was cooking and driving (even driving self to HD) prior to this hospitalization.  assist with all ADLs.   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 She was extuabted 1/26/21, course was complicated by ARDS requiring requiring reintubation, proning, in context of ongoing PJP pneumonia.  She underwent trach placement 2/12/21 and PEG-J 2/16/21.  Underwent dilation of right main stem bronchus 2/19/21.    Provider Information   Primary Care Physician:Rosy Vu   PH: [phone]  [facility]  [address], [place] [id number]  : [facility]    Mental Health/Chemical Dependency:   Diagnosis: anxiety/sleep - seroquel  Alcohol/Tobacco/Narcotis: None reported   Support/Services in Place: None reported   Services Needed/Recommended: Supportive services available by consult (Health Psychology and [place] services)   Sexuality/Intimacy: Not discussed     Support System  Marital Status:  to , Ranjan. 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  Other support available: Friends, community, extended family     Community Resources  Current in home services: None, was doing OP therapy but difficult to tolerate the therapy, not difficult to get there.   Previous services: See above, no HC or TCU.   Outpatient Dialysis: (2x/week PTA), pt was driving herself.   [facility]   [address]   EDILSON Mandujano is the intake contact (P: [phone] F:  [phone]).  *Call Lidia. Prefers MWF and any time works.     Financial/Employment/Education  Employment Status: Patient works from home. 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  Income Source: [employer] support and wages   Education: [employer]   Financial Concerns:  None reported   Insurance: BLUE PLUS STRIVE    Discharge Plan   Patient and family discharge goal: Home with HC vs OP, return to HD and family A   Provided Education on discharge plan: Yes  Patient agreeable to discharge plan:  Yes  Provided education and attained signature for Medicare IM and IRF Patient Rights and Privacy Information provided to patient : N/A  Provided patient with [place] [facility] [place] Resources: N/A  Barriers to discharge: None identified     Discharge Recommendations   Disposition: See above   Transportation Needs: Family assistance   Name of Transportation Company and Phone: N/A     Additional comments   Met with pt and pt . Both pleasant. Denied needs or concerns at this time. Eager to discharge home but wants to 'be ready'. SW will set up HD and update transplant SWer at discharge. RN CC following as well.       Please invite to Care Conference:  Pt , CC N/A     VERENICE Haque, [facility] Acute Rehab Unit   Phone: [phone]  I   Pager: [phone]

## 2021-02-26 NOTE — PROGRESS NOTES
[facility]   Acute Rehabilitation Unit  Daily progress note    INTERVAL HISTORY  Kecia Blue was seen and examined this morning, seated in chair in her room.  Her  was present at the time of our exam.  No acute events reported overnight, although she did trigger sepsis protocol, lactic acid returned WNL.  She reports that her first night went well overall.  She continues to note frequent productive cough, but denies increased shortness of breath.  She does note increased fatigue with therapy.  She had a bowel movement this morning, which was loose, but she has loose stools at baseline PTA and is now also on TF. She denies any abdominal pain or nausea.  She has low urinary output given ESRD, but denies dysuria or other new concerns.  She notes some lightheadedness early this AM, associated with decreased in BP, but states that resolved quickly.  She feels this drop in BP likely related to meds, as she has had in the past.  She reports having been to ARU previously after her transplant, so she is familiar with the routine and looking forward to getting started with therapies.    Functionally, therapy evals underway today.    MEDICATIONS    acetylcysteine  2 mL Inhalation BID     albuterol  2.5 mg Nebulization 4x Daily     apixaban ANTICOAGULANT  2.5 mg Oral BID     B and C vitamin Complex with folic acid  5 mL Oral Daily     budesonide  0.5 mg Nebulization BID     fiber modular (NUTRISOURCE FIBER)  1 packet Per Feeding Tube BID     insulin aspart  1-12 Units Subcutaneous Q4H     insulin glargine  10 Units Subcutaneous QAM AC     metoprolol tartrate  25 mg Oral or Feeding Tube BID     OLANZapine  10 mg Oral or Feeding Tube At Bedtime     pantoprazole  40 mg Oral QAM AC     posaconazole  300 mg Oral TID     predniSONE  2.5 mg Oral QPM     predniSONE  5 mg Oral Daily     protein modular  1 packet Per Feeding Tube BID     sulfamethoxazole-trimethoprim  10 mL Oral or  "Feeding Tube Once per day on Mon Wed Fri     tacrolimus  2 mg Oral QAM     tacrolimus  2.5 mg Oral QPM        acetaminophen, dextrose, glucose **OR** dextrose **OR** glucagon, - MEDICATION INSTRUCTIONS -     PHYSICAL EXAM  /59 (BP Location: Right arm)   Pulse 100   Temp 98.3  F (36.8  C) (Oral)   Resp 18   Ht 1.6 m (5' 3\")   Wt 49.8 kg (109 lb 12.8 oz)   LMP 06/07/2014 (Exact Date)   SpO2 98%   BMI 19.45 kg/m    Gen: awake, seated upright in chair, pleasant and cooperative, in NAD  HEENT: NCAT, anicteric sclera, EOMI, MMM, trach with speaking valve in place  Cardio: RRR, no murmurs appreciated  Pulm: non-labored on 2L by NC, lungs with some crackles in right lung base and mild coarseness throughout  Abd: soft, non-tender, non-distended, bowel sounds present, PEG/J present  Ext: no peripheral edema   Neuro/MSK: AAOx3, responds appropriately, speech clear/fluent, strength 4-5 throughout    LABS  CBC RESULTS:   Recent Labs   Lab Test 02/25/21  0542 02/23/21  0513 02/22/21  0527   WBC 2.9* 3.3* 3.0*   RBC 3.15* 2.94* 3.14*   HGB 9.9* 9.2* 9.7*   HCT 32.0* 29.8* 31.1*   * 101* 99   MCH 31.4 31.3 30.9   MCHC 30.9* 30.9* 31.2*   RDW 20.5* 20.8* 21.2*    253 241     Last Basic Metabolic Panel:  Recent Labs   Lab Test 02/25/21  0542 02/24/21  0539 02/23/21  0513   * 132* 132*   POTASSIUM 5.4* 4.3 5.3   CHLORIDE 98 98 98   CO2 29 28 28   ANIONGAP 5 6 6   * 140* 140*   BUN 65* 35* 58*   CR 3.25* 2.18* 3.43*   GFRESTIMATED 15* 24* 14*   CHELSEY 9.0 9.1 9.2       Rehabilitation - continue comprehensive acute inpatient rehabilitation program with multidisciplinary approach including therapies, rehab nursing, and physiatry following. See interval history for updates.      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  Kecia was admitted to an outside hospital on 1/22 with acute hypoxemic respiratory failure and new lung opacities on imaging, was ultimately diagnosed with PJP pneumonia, she was intubated and transferred to [facility] on 1/24, course further complicated by ARDS on 2/3, prolonged hypoxic respiratory failure s/p tracheostomy on 2/12 and PEG-J on 2/16, underwent dilation of right main bronchus on 2/19. Admitted to acute rehab unit 2/25/21 for ongoing rehabilitation and medical management.      Acute hypoxemic respiratory failure  Sepsis in setting of PJP pneumonia, possible secondary pneumonia  S/p tracheostomy placement (2/12/21)  Presented to outside hospital with acute hypoxemic respiratory failure secondary to PJP pneumonia. She failed extubation trials x2, developed ARDS.  Required proning, inhaled epoprostenol, pressors. required tracheostomy: 2/12. completed steroid course  2/17. recieved Bactrim X 21 days. s/p  Bronchoscopy on 2/19 (cultures showed Staph epidermidis)  and requiring only trach dome since.  - Hospitalist following, appreciate assistance  - Continue trach dome at bedtime & humidified via nasal cannula during day.  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 EBV viremia  Immunosuppression: Tacrolimus 2 mg qAM, 2.5 mg qPM via G tube.  Goal 8-10 (level on 2-25-21 is 8.1)   Prednisone 5 mg q am/2.5 mg po  q PM (transitioned from burst steroids on 2-18-21)  Prophylaxis: Bactrim q M, W, F for PJP ppx  - CMV level q 2 weeks (VGCV for CMV ppx done through 2-25-21)  - EBV level q 4 weeks (due 3-22-21)  - Appreciate hospitalist and pulmonary transplant assistance      Aspergillus Empyema   -Continue posaconazole 300 mg TID, plan for indefinite course per ID. Levels per pulm recs (<0.2 on 2-25-21)  - F/u ID as outpatient     ESRD  Prior to hospitalization, HD biweekly (M/Th) per [facility] nephrology. Required CRRT 2/4 to 2/8.  Access is AVF with partial graft, challenging to access.   - Appreciate ongoing nephrology support.   - Continue HD T/Th/Sat     Acute on Chronic Anemia- Hgb 9.9 2/25 receiving EPO with dialysis  - Trend CBC     Leukopenia- WBC 2.9 2/25 felt to be medication related  - Monitor CBC     Atrial fibrillation with RVR    Occurring intermittently, seen and evaluated by EP   - Follow up with EP outpatient with zio patch after discharge from ARU  - Continue metoprolol, apixaban.      Steroid/ Tube feed induced Hyperglycemia:  Hgb A1C 6.0% 1/24/21  - Lantus 10 units daily   - sliding scale insulin   - Hypoglycemia protocol      Severe Malnutrition- in context of chronic illness.  s/p PEG-J tube 2/16/21  - Continue TF via J  - Taper TF as able  - Appreciate nutrition consult  - Continue renal diet.     Anxiety: continue bedtime and as needed olanzapine, hydroxyzine prn     Hx anisocoria: no deficits on exam, negative CTH. Monitor.     HTN: holding pta meds (Coreg, amlodipine)  - Continue metoprolol  - Monitor BP      1. Adjustment to disability:  Clinical psychology to eval and treat as indicated  2. FEN: renal diet + TF  3. Bowel: continent, last BM 2/26  4. Bladder: continent, low urine output given ESRD, PVRs at admission: 1 so far at 0  5. DVT Prophylaxis: apixaban  6. GI Prophylaxis: PPI  7. Code: full, confirmed on admission  8. Disposition: home with family support  9. ELOS: 7-10 days  10. Follow up  Appointments on Discharge: EP cardiology with Zio patch prior to discharge.  ID for Posaconazole management for aspergillus empyema and history of CMV and EBV viremia, Pulmonology for repeat bronch (4-2-21), Nephrology for continued dialysis.      Patient seen and discussed with Dr. Shree Ford, PM&R Staff Physician    Lissett Rodriguez PA-C  Physical Medicine & Rehabilitation

## 2021-02-26 NOTE — PLAN OF CARE
Physical Therapy Discharge Summary    Reason for therapy discharge:    Discharged to acute rehabilitation facility.    Progress towards therapy goal(s). See goals on Care Plan in [facility] electronic health record for goal details.  Goals not met.  Barriers to achieving goals:   discharge from facility.    Therapy recommendation(s):    Continued therapy is recommended.  Rationale/Recommendations:  to maximize strength, balance, activity tolerance, and mobility IND.

## 2021-02-27 LAB
ALBUMIN SERPL-MCNC: 2.5 G/DL (ref 3.4–5)
ALP SERPL-CCNC: 326 U/L (ref 40–150)
ALT SERPL W P-5'-P-CCNC: 44 U/L (ref 0–50)
ANION GAP SERPL CALCULATED.3IONS-SCNC: 9 MMOL/L (ref 3–14)
AST SERPL W P-5'-P-CCNC: 16 U/L (ref 0–45)
BILIRUB DIRECT SERPL-MCNC: 0.2 MG/DL (ref 0–0.2)
BILIRUB SERPL-MCNC: 0.4 MG/DL (ref 0.2–1.3)
BUN SERPL-MCNC: 78 MG/DL (ref 7–30)
CALCIUM SERPL-MCNC: 9.2 MG/DL (ref 8.5–10.1)
CHLORIDE SERPL-SCNC: 89 MMOL/L (ref 94–109)
CMV DNA SPEC NAA+PROBE-ACNC: NORMAL [IU]/ML
CMV DNA SPEC NAA+PROBE-LOG#: NORMAL {LOG_IU}/ML
CO2 SERPL-SCNC: 26 MMOL/L (ref 20–32)
CREAT SERPL-MCNC: 3.43 MG/DL (ref 0.52–1.04)
ERYTHROCYTE [DISTWIDTH] IN BLOOD BY AUTOMATED COUNT: 19.7 % (ref 10–15)
GFR SERPL CREATININE-BSD FRML MDRD: 14 ML/MIN/{1.73_M2}
GLUCOSE BLDC GLUCOMTR-MCNC: 118 MG/DL (ref 70–99)
GLUCOSE BLDC GLUCOMTR-MCNC: 147 MG/DL (ref 70–99)
GLUCOSE BLDC GLUCOMTR-MCNC: 182 MG/DL (ref 70–99)
GLUCOSE BLDC GLUCOMTR-MCNC: 182 MG/DL (ref 70–99)
GLUCOSE BLDC GLUCOMTR-MCNC: 223 MG/DL (ref 70–99)
GLUCOSE SERPL-MCNC: 218 MG/DL (ref 70–99)
HCT VFR BLD AUTO: 30.6 % (ref 35–47)
HGB BLD-MCNC: 9.8 G/DL (ref 11.7–15.7)
MCH RBC QN AUTO: 31.7 PG (ref 26.5–33)
MCHC RBC AUTO-ENTMCNC: 32 G/DL (ref 31.5–36.5)
MCV RBC AUTO: 99 FL (ref 78–100)
PLATELET # BLD AUTO: 268 10E9/L (ref 150–450)
POTASSIUM SERPL-SCNC: 6.2 MMOL/L (ref 3.4–5.3)
PROT SERPL-MCNC: 6.6 G/DL (ref 6.8–8.8)
RBC # BLD AUTO: 3.09 10E12/L (ref 3.8–5.2)
SODIUM SERPL-SCNC: 124 MMOL/L (ref 133–144)
SPECIMEN SOURCE: NORMAL
TACROLIMUS BLD-MCNC: 12.4 UG/L (ref 5–15)
TME LAST DOSE: NORMAL H
WBC # BLD AUTO: 4.2 10E9/L (ref 4–11)

## 2021-02-27 PROCEDURE — 128N000003 HC R&B REHAB

## 2021-02-27 PROCEDURE — 94640 AIRWAY INHALATION TREATMENT: CPT | Mod: 76

## 2021-02-27 PROCEDURE — 250N000012 HC RX MED GY IP 250 OP 636 PS 637: Performed by: NURSE PRACTITIONER

## 2021-02-27 PROCEDURE — 999N001017 HC STATISTIC GLUCOSE BY METER IP

## 2021-02-27 PROCEDURE — 94668 MNPJ CHEST WALL SBSQ: CPT

## 2021-02-27 PROCEDURE — 36415 COLL VENOUS BLD VENIPUNCTURE: CPT | Performed by: PHYSICIAN ASSISTANT

## 2021-02-27 PROCEDURE — 250N000011 HC RX IP 250 OP 636: Performed by: PEDIATRICS

## 2021-02-27 PROCEDURE — 5A1D70Z PERFORMANCE OF URINARY FILTRATION, INTERMITTENT, LESS THAN 6 HOURS PER DAY: ICD-10-PCS | Performed by: CLINICAL NURSE SPECIALIST

## 2021-02-27 PROCEDURE — 99233 SBSQ HOSP IP/OBS HIGH 50: CPT | Performed by: PHYSICIAN ASSISTANT

## 2021-02-27 PROCEDURE — 85027 COMPLETE CBC AUTOMATED: CPT | Performed by: PHYSICIAN ASSISTANT

## 2021-02-27 PROCEDURE — 250N000009 HC RX 250: Performed by: CLINICAL NURSE SPECIALIST

## 2021-02-27 PROCEDURE — 272N000078 HC NUTRITION PRODUCT INTERMEDIATE LITER

## 2021-02-27 PROCEDURE — 94640 AIRWAY INHALATION TREATMENT: CPT

## 2021-02-27 PROCEDURE — 250N000013 HC RX MED GY IP 250 OP 250 PS 637: Performed by: PHYSICIAN ASSISTANT

## 2021-02-27 PROCEDURE — 99207 PR CDG-CUT & PASTE-POTENTIAL IMPACT ON LEVEL: CPT | Performed by: PHYSICIAN ASSISTANT

## 2021-02-27 PROCEDURE — 999N000157 HC STATISTIC RCP TIME EA 10 MIN

## 2021-02-27 PROCEDURE — 250N000012 HC RX MED GY IP 250 OP 636 PS 637: Performed by: PHYSICIAN ASSISTANT

## 2021-02-27 PROCEDURE — 250N000013 HC RX MED GY IP 250 OP 250 PS 637: Performed by: INTERNAL MEDICINE

## 2021-02-27 PROCEDURE — 250N000009 HC RX 250: Performed by: PHYSICIAN ASSISTANT

## 2021-02-27 PROCEDURE — 80048 BASIC METABOLIC PNL TOTAL CA: CPT | Performed by: PHYSICIAN ASSISTANT

## 2021-02-27 PROCEDURE — 80076 HEPATIC FUNCTION PANEL: CPT | Performed by: PHYSICIAN ASSISTANT

## 2021-02-27 PROCEDURE — 634N000001 HC RX 634: Performed by: CLINICAL NURSE SPECIALIST

## 2021-02-27 PROCEDURE — 90937 HEMODIALYSIS REPEATED EVAL: CPT

## 2021-02-27 PROCEDURE — 258N000003 HC RX IP 258 OP 636: Performed by: CLINICAL NURSE SPECIALIST

## 2021-02-27 PROCEDURE — 250N000011 HC RX IP 250 OP 636: Performed by: CLINICAL NURSE SPECIALIST

## 2021-02-27 PROCEDURE — 80197 ASSAY OF TACROLIMUS: CPT | Performed by: PHYSICIAN ASSISTANT

## 2021-02-27 RX ORDER — HEPARIN SODIUM 1000 [USP'U]/ML
500 INJECTION, SOLUTION INTRAVENOUS; SUBCUTANEOUS
Status: COMPLETED | OUTPATIENT
Start: 2021-02-27 | End: 2021-02-27

## 2021-02-27 RX ORDER — ONDANSETRON 4 MG/1
4 TABLET, ORALLY DISINTEGRATING ORAL EVERY 6 HOURS PRN
Status: DISCONTINUED | OUTPATIENT
Start: 2021-02-27 | End: 2021-03-05 | Stop reason: HOSPADM

## 2021-02-27 RX ORDER — SODIUM POLYSTYRENE SULFONATE 15 G/60ML
15 SUSPENSION ORAL; RECTAL ONCE
Status: COMPLETED | OUTPATIENT
Start: 2021-02-27 | End: 2021-02-27

## 2021-02-27 RX ORDER — HEPARIN SODIUM 1000 [USP'U]/ML
500 INJECTION, SOLUTION INTRAVENOUS; SUBCUTANEOUS CONTINUOUS
Status: DISCONTINUED | OUTPATIENT
Start: 2021-02-27 | End: 2021-02-27

## 2021-02-27 RX ORDER — PROCHLORPERAZINE MALEATE 10 MG
10 TABLET ORAL
Status: DISCONTINUED | OUTPATIENT
Start: 2021-02-27 | End: 2021-03-05 | Stop reason: HOSPADM

## 2021-02-27 RX ADMIN — Medication 1 PACKET: at 11:37

## 2021-02-27 RX ADMIN — BUDESONIDE 0.5 MG: 0.5 INHALANT RESPIRATORY (INHALATION) at 20:10

## 2021-02-27 RX ADMIN — ALBUTEROL SULFATE 2.5 MG: 2.5 SOLUTION RESPIRATORY (INHALATION) at 09:10

## 2021-02-27 RX ADMIN — METOPROLOL TARTRATE 25 MG: 25 TABLET, FILM COATED ORAL at 11:35

## 2021-02-27 RX ADMIN — PANTOPRAZOLE SODIUM 40 MG: 40 TABLET, DELAYED RELEASE ORAL at 11:35

## 2021-02-27 RX ADMIN — METOPROLOL TARTRATE 25 MG: 25 TABLET, FILM COATED ORAL at 21:35

## 2021-02-27 RX ADMIN — LIDOCAINE HYDROCHLORIDE 0.5 ML: 10 INJECTION, SOLUTION EPIDURAL; INFILTRATION; INTRACAUDAL; PERINEURAL at 13:44

## 2021-02-27 RX ADMIN — ACETYLCYSTEINE 2 ML: 100 INHALANT RESPIRATORY (INHALATION) at 09:10

## 2021-02-27 RX ADMIN — ONDANSETRON 4 MG: 4 TABLET, ORALLY DISINTEGRATING ORAL at 06:45

## 2021-02-27 RX ADMIN — LIDOCAINE HYDROCHLORIDE 0.5 ML: 10 INJECTION, SOLUTION EPIDURAL; INFILTRATION; INTRACAUDAL; PERINEURAL at 13:45

## 2021-02-27 RX ADMIN — OLANZAPINE 10 MG: 5 TABLET, FILM COATED ORAL at 21:35

## 2021-02-27 RX ADMIN — PREDNISONE 2.5 MG: 2.5 TABLET ORAL at 21:35

## 2021-02-27 RX ADMIN — IRON SUCROSE 100 MG: 20 INJECTION, SOLUTION INTRAVENOUS at 13:46

## 2021-02-27 RX ADMIN — INSULIN ASPART 1 UNITS: 100 INJECTION, SOLUTION INTRAVENOUS; SUBCUTANEOUS at 11:59

## 2021-02-27 RX ADMIN — POSACONAZOLE 300 MG: 100 TABLET, COATED ORAL at 11:34

## 2021-02-27 RX ADMIN — Medication 5 ML: at 11:36

## 2021-02-27 RX ADMIN — Medication 1 PACKET: at 21:35

## 2021-02-27 RX ADMIN — SODIUM CHLORIDE 300 ML: 9 INJECTION, SOLUTION INTRAVENOUS at 13:39

## 2021-02-27 RX ADMIN — EPOETIN ALFA-EPBX 4000 UNITS: 10000 INJECTION, SOLUTION INTRAVENOUS; SUBCUTANEOUS at 14:53

## 2021-02-27 RX ADMIN — POSACONAZOLE 300 MG: 100 TABLET, COATED ORAL at 19:04

## 2021-02-27 RX ADMIN — ALBUTEROL SULFATE 2.5 MG: 2.5 SOLUTION RESPIRATORY (INHALATION) at 20:10

## 2021-02-27 RX ADMIN — BUDESONIDE 0.5 MG: 0.5 INHALANT RESPIRATORY (INHALATION) at 09:10

## 2021-02-27 RX ADMIN — TACROLIMUS 2 MG: 5 CAPSULE ORAL at 19:19

## 2021-02-27 RX ADMIN — HEPARIN SODIUM 500 UNITS/HR: 1000 INJECTION INTRAVENOUS; SUBCUTANEOUS at 13:44

## 2021-02-27 RX ADMIN — PREDNISONE 5 MG: 5 TABLET ORAL at 11:35

## 2021-02-27 RX ADMIN — SODIUM POLYSTYRENE SULFONATE 15 G: 15 SUSPENSION ORAL; RECTAL at 08:29

## 2021-02-27 RX ADMIN — HEPARIN SODIUM 500 UNITS: 1000 INJECTION INTRAVENOUS; SUBCUTANEOUS at 13:51

## 2021-02-27 RX ADMIN — SODIUM CHLORIDE 250 ML: 9 INJECTION, SOLUTION INTRAVENOUS at 13:39

## 2021-02-27 RX ADMIN — ACETYLCYSTEINE 2 ML: 100 INHALANT RESPIRATORY (INHALATION) at 20:11

## 2021-02-27 RX ADMIN — INSULIN GLARGINE 10 UNITS: 100 INJECTION, SOLUTION SUBCUTANEOUS at 08:17

## 2021-02-27 ASSESSMENT — MIFFLIN-ST. JEOR: SCORE: 1058.52

## 2021-02-27 NOTE — PLAN OF CARE
A&O x4. Pt denied pain, SOB overnight. Pt has infrequent, productive cough. Pt blood glucose overnight was 182 mg/dL. Pt had some abdominal discomfort and nausea this am. Provider paged. Zofran ordered. Pt given Zofran. Pt asked to wait on taking morning Protonix. Pt TF started late yesterday and set to run until 0700 but stopped at 0645 because of discomfort. Pt slept majority of shift. Call light within reach and pt able to make needs known.

## 2021-02-27 NOTE — PROGRESS NOTES
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 She was extuabted 1/26/21, course was complicated by ARDS requiring requiring reintubation, proning, in context of ongoing PJP pneumonia.  She underwent trach placement 2/12/21 and PEG-J 2/16/21.  Underwent dilation of right main stem bronchus 2/19/21. Pt's trach was downsized to a Shiley #6 cuffless - pt had VFSS on 2/24/21- placed on a regular die with thin liquids- tolerating well; pt completely independent with use /wearing of PMSV and donning and doffing- tolerating  and wearing throughout the day    General Observations pleasant and cooperative   Type of Evaluation   Type of Evaluation Artificial Airway (Speaking Valve)   Intubation History (Speaking Valve)   Date of Intubation (Speaking Valve) 01/24/21   Type of Intubation (Speaking Valve) oral   Date of Extubation (Speaking Valve) 01/29/21   Tracheostomy Assessment (Speaking Valve)   Date of Tracheostomy 02/12/21   Type, Tracheostomy Tube Shiley   Tube Size, Tracheostomy 6   Cuff, Tracheostomy Tube cuffless   Participation Ability (Speaking Valve) awake/alert;leak speech used;follows simple commands   Comment, Tracheostomy (Speaking Valve) pt initially had a #8 trach tube but downsized to now a #6 Shi8le- cuffless   Respiratory Status (Speaking Valve)    Oxygen Supply Humidity;nasal cannula   Oral/Tracheal Secretions (Speaking Valve)   Oral Secretions (Speaking Valve Assessment) minimal secretions;clear   Tracheal Secretions (Speaking Valve Assessment) minimal secretions;clear   Speaking Valve Trials (Speaking Valve)   Airflow/Phonation Air flow around trach adequate with finger occlusion   Speaking Valve placed on tracheostomy tube  (already wears throughout the day independently)   Oxygen saturation with PMSV placement 96 %   Breath Support (Speaking Valve Trial) exhales through mouth;coordinates speech with breath support   Voice Production (Speaking Valve Trial) voicing achieved;good strength/quality;good ability to change pitch   Cough Production (Speaking Valve Trial) good;reflexive   Secretions During Valve Use (Speaking Valve Trial) secretions managed well during valve use   Outcome of Trial (Speaking Valve) tolerance is good   Total amount of time with PMSV placement: already wearin gPMSV for about 3/- 4 hours at time of eval    Recommendations (Speaking Valve Trials) speaking valve use recommended   Oral Motor   Oral Musculature generally intact   General Therapy Interventions   Planned Therapy Interventions Communication   Communication Speaking valve instruction   Intervention Comments f/u 2 times per week for ongoing tolerance of  PMSV   SLP Therapy Assessment/Plan   Criteria for Skilled Therapeutic Interventions Met (SLP Eval) yes;treatment indicated   SLP Diagnosis functional communication wiht PMSV   Rehab Potential (SLP Eval) good, to achieve stated therapy goals   Therapy Frequency (SLP Eval) 2 times/wk   Predicted Duration of Therapy Intervention (SLP Eval) 1 week   Comment, Therapy Assessment/Plan (SLP) Completed  communication eval for PMSV tolerance per MD orders. Pt currently has a #6 Shiley trach- cuffless. Pt was already assessed for PMSV ( speacking valve use)- amaya in hospital and has advanced to independent use of PMSV while still in  [facility]. pt santiins O2 sats at 96% while wearing PMSV. At the time of this eval- pt had already been wearing the PMSV for 3-4 hours without difficulty. Pt reports that she has been wearing it daily - all day until she goes to sleep. Pt is able to demonstate indepednece with donning and doffing PMSV and is able to relay the care for it for clearing. Further pt is able to state safety guidelines for wearing PMSV: removal while sleeping. removal if she becomes SOB or if there is increased WOB.or if O2 sats drop below 90%. Further pt demosntrates adequate voicing- stonge, loud and intelligible. pt also knows to wear PMSV for all po intake. At this point- SLP will plan to just check in 2x per week to assure that PMSV tolerance is maintained- Pt shoul dcontinue to wear throguht the day as tolerated  and as noted above- remove when sleeping or if she becomes fatigued, SOB or if O2 sats drop below 90%- pt will wear nasal cannula during day and humidification sourse in evening. At this point pt would be likely ready to trial trach capping - but MD will consult with pulmonology team for further next step direction with this.    Total Evaluation Time   Total Evaluation Time (Minutes) 25

## 2021-02-27 NOTE — PROGRESS NOTES
FOCUS/GOAL  Bowel management, Bladder management, Pain management, Mobility, Skin integrity and Safety management    ASSESSMENT, INTERVENTIONS AND CONTINUING PLAN FOR GOAL:  A/O, VS stable. Ate most of lunch. Continent of bowel, pt does not urinate due to dialysis. No complaints of pain this shift. Assist of 1 with walker for transfers. No current skin concerns, trach cares completed. Calls appropriately with light. Pt had nausea and dizziness this morning that prevented participation in therapies and medications. Pt was given zofran and a cool environment with no change. Potassium was 6.2 so medication was ordered to bind potassium. Pt slept for a few hours and then felt up to eating and taking medications. Medications given and pt send to hemodialysis.

## 2021-02-27 NOTE — PROGRESS NOTES
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 She was admitted to [facility] 1/4-2/25/21 for acute hypoxic respiratory failure found to have PJP pneumonia w/ stay c/b ARDS requiring tracheostomy, PEG/J, and dilation of right main bronchus as well as a fib w/ RVR. Transferred to ARU for ongoing rehabilitation. IM following d/t transplant hx.     #Hyperkalemia. K 6.2 from 5.4 on 2/25 before HD. On low K diet and Nutren TFs. Suspect multifactorial w/ ESRD, Bactrim, tacrolimus.   - 15 g kayexalate given. Cannot shift IV on rehab unit. Will go for HD around noon today. Discussed w/ nephrology fellow.   - Send tacro level.     #Hyponatremia. Na 124 from low 130s. Suspect hypervolemic w/ ESRD and nephrology has been trying to increase UF if BPs allow.   - Trend BMP in AM.     #Nausea. New today. Just changed TF cycle from 12 hours off to 8 hours off, could be contributing. Also lab abnormalities, medications (? posaconazole - changed to PO recently instead of tube suspension d/t low levels).   - Ok to vent G tube PRN for nausea as long as it is clamped for the usual amount of time after meds.   - Can try a dose of Compazine as 2nd line after Zofran.   - Dietitian to follow and consider adjustments to TFs pending course.     #History of Lung Transplant.  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 On 2/19/21 had right mainstem bronchus dilation.   - Continue immunosuppression w/ tacrolimus 2 mg AM/2.5 mg PM via G tube only (goal 8-10; checked Q M/Th)  and prednisone 5 mg AM/2.5 mg PM (no further taper planned at this time).   - Continue prophylaxis w/ Bactrim Q MWF.   - CMP, CBC, Mg, phos, tacro level Q M/Th and CXR Q Monday.   - Pulmonology to follow here.   - Will have repeat bronch scheduled ~6 weeks (first week of April - TBD).      #Acute Hypoxemic Respiratory Failure s/p Tracheostomy.   #PJP Pneumonia.   Presented in respiratory failure and ultimately failed extubation trials x2 and developed ARDS. Required proning, inhaled epoprostenol, pressors. Tracheostomy done 2/12 and exchanged for #6 cuffless Shiley on 2/22. Completed steroid taper 2/17 and 21 d course of Bactrim 2/15. Was most recently transitioned from continuous trach dome to humidified 2-4L NC.  - Continue to wean supplemental O2 as able. This should be humidified when in use. TD can be used PRN for additional humidity if needed for secretions.   - Trach to be covered w/ PMSV during the day and HME overnight. Pulm will follow to assist w/ trach weaning here.   - For now continue nebs (QID albuterol, BID Mucomyst, BID Pulmicort).      #History of Left Sided Aspergillus Empyema. Diagnosed 10/2019 s/p amphotericin beads and is on indefinite posaconazole. Last level 2/25 still very low but transitioned from suspension/feeding tube to PO dosing that should be taken w/ food.   - Continue posaconazole 300 mg PO TID.   - Next posaconazole level due 3/2.   - Will need OP ID f/u.      #History of CMV and EBV Viremias. Last CMV PCR 1/25 was <137, level from 2/25 still pending. EBV level 12/9/20 mildly elevated to 10K (log 4) from prior 3K (3.5 log) on 9/9/20, repeat (1/25/21) decreased to 5619 copies (log of 3.8), but increased on 2/22/21 to 75,709 (log of 4.9). This increase is likely secondary to acute illness and recent steroid burst.   - Q2week CMV PCR for now since recently stopping VGCV prophylactic course (next due 3/11).  - Next EBV PCR due 3/22.      #ESRD. Currently on HD Tu/Th/Sat (was biweekly  "prior to hospitalization) via AVF w/ partial graft that can be challenging to access. Had CRRT 2/5-2/8 d/t hemodynamic instability, otherwise has tolerated iHD (temp line removed 2/9).   - Nephrology following for TTS HD.   - Has required occasional midodrine during HD.   - Ok for just low K diet rather than full renal diet to encourage PO intake.      #PAF. In setting of lung disease and critical illness. Short self limited runs in the hospital. Last echo 1/25/21 w/ normal LVEF and RV size/function, high RA pressure. CHADSVasc 1 for gender corresponding to 1.3% annual stroke risk indicating no need for long term oral anticoagulation (per 2/22 EP note). Despite this was on Eliquis upon arrival to ARU - this was discussed w/ patient and pulmonology and decision made to stop it. Her only DVT was line associated after transplant in 2018 and was not on anticoagulation prior to hospitalization. NSR on exam.   - Needs OP Ziopatch arranged at ARU discharge.   - Continue metoprolol 25 mg BID.   - No anticoagulation indicated.      #Anxiety. On high dose 10 mg HS Zyprexa. May be able to wean this while here.      #Steroid Induced Hyperglycemia. HgbA1c 6% on 1/24/21. Is on cycled TFs overnight and now taking PO. Steroid taper was completed 2/17. BG controlled on Lantus 10 units and \"high\" Novolog correction scale, although this AM w/ BGs low 200s.   - Continue Lantus 10 units daily.   - Continue Novolog sliding scale AC/HS.   - Will follow BG trend.     IM will follow.   Discussed with bedside/charge RNs, nephrology fellow, and medicine attending.     Ethel Hernadez PA-C  Hospitalist Service  Pager: [phone]  ________________________________________________________________    Subjective & Interval History:  Chief complaint of nausea and lightheadedness this morning. Zofran not very helpful. Has not vomited. No abdominal pain. Last BM yesterday. No f/c. No new cough, dyspnea, or chest pain/pressure. No urinary complaints. " "    Last 24 hour care team notes reviewed.   ROS: 4 point ROS (including Respiratory, CV, GI and ) was performed and negative unless otherwise noted in HPI.     Medications: Reviewed in EPIC.    Physical Exam:    Blood pressure 104/55, pulse 99, temperature 98  F (36.7  C), temperature source Oral, resp. rate 20, height 1.6 m (5' 3\"), weight 50.9 kg (112 lb 4.8 oz), last menstrual period 06/07/2014, SpO2 98 %.    GENERAL: Alert and oriented x 3. Lying in bed. Conversant.  at bedside.   HEENT: Anicteric sclera. Mucous membranes moist.   CV: RRR. S1, S2. No murmurs appreciated.   RESPIRATORY: Effort normal on 2L NC. Lungs CTAB with no wheezing, rales, rhonchi.   GI: Abdomen soft, non distended, non tender.   NEUROLOGICAL: No focal deficits. Moves all extremities.    EXTREMITIES: No peripheral edema. Warm and well perfused.   SKIN: No jaundice. No rashes.                       "

## 2021-02-27 NOTE — PROGRESS NOTES
Pulmonary CF/ Transplant Brief Note  February 27, 2021      Paged by primary team regarding supratherapeutic tacrolimus level of 12.4 (14 hr level). Goal level 8-10. Also with notable hyperkalemia (K+ 6.2), received kayexalate and currently at [facility] for scheduled HD. Pt reporting nausea, abdominal discomfort, malaise, and fatigue.   - Continue to hold AM dose (has not yet received her morning dose today), and would resume this evening with dose reduction from 2 mg qAM / 2.5 mg qPM to 2 mg BID (via G tube only)- ordered for you. Recheck levels daily x3 days 2/28-3/2 (ordered)    Patient discussed with Dr. Fermin Ibarra, APRN, CNP   Inpatient Nurse Practitioner  Pulmonary CF/Transplant  Pager [phone]

## 2021-02-27 NOTE — PLAN OF CARE
Patient is alert and oriented x4. Able to make needs known. BP 98/61 at the start of this shift. 101/57 prior to bedtime metoprolol.  Pt asymptomatic. Encouraged fluid this shift. Tachy at 106. Triggered sepsis protocol. Lactate came back 1.1. monitored VS. Last /60, JQ=217. Sats  have been upper 90s on 2L/NC. Intermittent productive cough this shift. Pt declined trach care stating it was just done. Site looked WNL. Speaking valve on until bedtime. Pt has HME on for the night. Denied pain, denied sob, denied CP. CGA with walker and gait belt for transfers and mobility. continent of BB on toilet. LBM today. Fair appetite at dinner. TF started at 7pm per pt request. Pt wanted to eat her dinner before starting the TF. GJ tube site cleaned and new dressing applied. Site WNL. BGs were 172 and 150. No other care concern. Continue with POC.

## 2021-02-28 ENCOUNTER — APPOINTMENT (OUTPATIENT)
Dept: OCCUPATIONAL THERAPY | Facility: CLINIC | Age: 61
End: 2021-02-28
Payer: COMMERCIAL

## 2021-02-28 ENCOUNTER — APPOINTMENT (OUTPATIENT)
Dept: PHYSICAL THERAPY | Facility: CLINIC | Age: 61
End: 2021-02-28
Payer: COMMERCIAL

## 2021-02-28 ENCOUNTER — APPOINTMENT (OUTPATIENT)
Dept: SPEECH THERAPY | Facility: CLINIC | Age: 61
End: 2021-02-28
Payer: COMMERCIAL

## 2021-02-28 LAB
ANION GAP SERPL CALCULATED.3IONS-SCNC: 9 MMOL/L (ref 3–14)
BUN SERPL-MCNC: 46 MG/DL (ref 7–30)
CALCIUM SERPL-MCNC: 8.7 MG/DL (ref 8.5–10.1)
CHLORIDE SERPL-SCNC: 96 MMOL/L (ref 94–109)
CO2 SERPL-SCNC: 28 MMOL/L (ref 20–32)
CREAT SERPL-MCNC: 2.19 MG/DL (ref 0.52–1.04)
GFR SERPL CREATININE-BSD FRML MDRD: 24 ML/MIN/{1.73_M2}
GLUCOSE BLDC GLUCOMTR-MCNC: 117 MG/DL (ref 70–99)
GLUCOSE BLDC GLUCOMTR-MCNC: 120 MG/DL (ref 70–99)
GLUCOSE BLDC GLUCOMTR-MCNC: 122 MG/DL (ref 70–99)
GLUCOSE BLDC GLUCOMTR-MCNC: 135 MG/DL (ref 70–99)
GLUCOSE BLDC GLUCOMTR-MCNC: 168 MG/DL (ref 70–99)
GLUCOSE SERPL-MCNC: 178 MG/DL (ref 70–99)
LACTATE BLD-SCNC: 1 MMOL/L (ref 0.7–2)
POTASSIUM SERPL-SCNC: 4.7 MMOL/L (ref 3.4–5.3)
SODIUM SERPL-SCNC: 133 MMOL/L (ref 133–144)
TACROLIMUS BLD-MCNC: 15.1 UG/L (ref 5–15)
TME LAST DOSE: ABNORMAL H

## 2021-02-28 PROCEDURE — 99207 PR CDG-CUT & PASTE-POTENTIAL IMPACT ON LEVEL: CPT | Performed by: PHYSICIAN ASSISTANT

## 2021-02-28 PROCEDURE — 92526 ORAL FUNCTION THERAPY: CPT | Mod: GN | Performed by: SPEECH-LANGUAGE PATHOLOGIST

## 2021-02-28 PROCEDURE — 80197 ASSAY OF TACROLIMUS: CPT | Performed by: NURSE PRACTITIONER

## 2021-02-28 PROCEDURE — 99233 SBSQ HOSP IP/OBS HIGH 50: CPT | Performed by: PHYSICIAN ASSISTANT

## 2021-02-28 PROCEDURE — 128N000003 HC R&B REHAB

## 2021-02-28 PROCEDURE — 36415 COLL VENOUS BLD VENIPUNCTURE: CPT | Performed by: PHYSICAL MEDICINE & REHABILITATION

## 2021-02-28 PROCEDURE — 97110 THERAPEUTIC EXERCISES: CPT | Mod: GO

## 2021-02-28 PROCEDURE — 250N000013 HC RX MED GY IP 250 OP 250 PS 637: Performed by: PHYSICIAN ASSISTANT

## 2021-02-28 PROCEDURE — 272N000078 HC NUTRITION PRODUCT INTERMEDIATE LITER

## 2021-02-28 PROCEDURE — 80048 BASIC METABOLIC PNL TOTAL CA: CPT | Performed by: PHYSICIAN ASSISTANT

## 2021-02-28 PROCEDURE — 94640 AIRWAY INHALATION TREATMENT: CPT

## 2021-02-28 PROCEDURE — 97535 SELF CARE MNGMENT TRAINING: CPT | Mod: GO

## 2021-02-28 PROCEDURE — 94668 MNPJ CHEST WALL SBSQ: CPT

## 2021-02-28 PROCEDURE — 99231 SBSQ HOSP IP/OBS SF/LOW 25: CPT | Mod: GC | Performed by: STUDENT IN AN ORGANIZED HEALTH CARE EDUCATION/TRAINING PROGRAM

## 2021-02-28 PROCEDURE — 97110 THERAPEUTIC EXERCISES: CPT | Mod: GP

## 2021-02-28 PROCEDURE — 83605 ASSAY OF LACTIC ACID: CPT | Performed by: PHYSICAL MEDICINE & REHABILITATION

## 2021-02-28 PROCEDURE — 94640 AIRWAY INHALATION TREATMENT: CPT | Mod: 76

## 2021-02-28 PROCEDURE — 999N000157 HC STATISTIC RCP TIME EA 10 MIN

## 2021-02-28 PROCEDURE — 250N000009 HC RX 250: Performed by: PHYSICIAN ASSISTANT

## 2021-02-28 PROCEDURE — 250N000012 HC RX MED GY IP 250 OP 636 PS 637: Performed by: NURSE PRACTITIONER

## 2021-02-28 PROCEDURE — 250N000012 HC RX MED GY IP 250 OP 636 PS 637: Performed by: PHYSICIAN ASSISTANT

## 2021-02-28 PROCEDURE — 36415 COLL VENOUS BLD VENIPUNCTURE: CPT | Performed by: PHYSICIAN ASSISTANT

## 2021-02-28 PROCEDURE — 999N001017 HC STATISTIC GLUCOSE BY METER IP

## 2021-02-28 RX ADMIN — TACROLIMUS 2 MG: 5 CAPSULE ORAL at 08:21

## 2021-02-28 RX ADMIN — PANTOPRAZOLE SODIUM 40 MG: 40 TABLET, DELAYED RELEASE ORAL at 06:51

## 2021-02-28 RX ADMIN — POSACONAZOLE 300 MG: 100 TABLET, COATED ORAL at 21:36

## 2021-02-28 RX ADMIN — ACETYLCYSTEINE 2 ML: 100 INHALANT RESPIRATORY (INHALATION) at 20:50

## 2021-02-28 RX ADMIN — POSACONAZOLE 300 MG: 100 TABLET, COATED ORAL at 16:01

## 2021-02-28 RX ADMIN — PREDNISONE 5 MG: 5 TABLET ORAL at 08:21

## 2021-02-28 RX ADMIN — POSACONAZOLE 300 MG: 100 TABLET, COATED ORAL at 08:21

## 2021-02-28 RX ADMIN — Medication 1 PACKET: at 21:38

## 2021-02-28 RX ADMIN — INSULIN GLARGINE 10 UNITS: 100 INJECTION, SOLUTION SUBCUTANEOUS at 08:29

## 2021-02-28 RX ADMIN — ACETYLCYSTEINE 2 ML: 100 INHALANT RESPIRATORY (INHALATION) at 09:43

## 2021-02-28 RX ADMIN — BUDESONIDE 0.5 MG: 0.5 INHALANT RESPIRATORY (INHALATION) at 09:43

## 2021-02-28 RX ADMIN — METOPROLOL TARTRATE 25 MG: 25 TABLET, FILM COATED ORAL at 21:35

## 2021-02-28 RX ADMIN — ACETYLCYSTEINE 2 ML: 100 INHALANT RESPIRATORY (INHALATION) at 14:58

## 2021-02-28 RX ADMIN — BUDESONIDE 0.5 MG: 0.5 INHALANT RESPIRATORY (INHALATION) at 20:50

## 2021-02-28 RX ADMIN — ALBUTEROL SULFATE 2.5 MG: 2.5 SOLUTION RESPIRATORY (INHALATION) at 09:43

## 2021-02-28 RX ADMIN — ALBUTEROL SULFATE 2.5 MG: 2.5 SOLUTION RESPIRATORY (INHALATION) at 14:58

## 2021-02-28 RX ADMIN — TACROLIMUS 1.5 MG: 5 CAPSULE ORAL at 18:26

## 2021-02-28 RX ADMIN — Medication 1 PACKET: at 08:20

## 2021-02-28 RX ADMIN — OLANZAPINE 10 MG: 5 TABLET, FILM COATED ORAL at 21:35

## 2021-02-28 RX ADMIN — ALBUTEROL SULFATE 2.5 MG: 2.5 SOLUTION RESPIRATORY (INHALATION) at 20:51

## 2021-02-28 RX ADMIN — Medication 5 ML: at 08:21

## 2021-02-28 RX ADMIN — PREDNISONE 2.5 MG: 2.5 TABLET ORAL at 21:35

## 2021-02-28 ASSESSMENT — MIFFLIN-ST. JEOR: SCORE: 1058.52

## 2021-02-28 NOTE — PLAN OF CARE
Discharge Planner Post-Acute Rehab SLP:     Discharge Plan: home - but may not need HH sptx if ends up being decannulated prior to d/c    Precautions: tracheostomy and use of PMSV; fall    Current Status:  Communication: Pt  communicates with use of PMSV -Passy Musa speaking valve --has a # 6 shiley cuffless trach- pt is completely independent with donning and doffing PMSV and care of speaking valve; further pt is independent with wearing PMSV- wears throughout the day without difficulty and follows guidelines of removing PMSV when sleeping, or if increaes of SOB or WOB or if O2 sats dip below 90%--  however pt has not been experiencing any of this- is tolerating well. Further pt is wearing nasal cannula during the day and just humidity at night   Cognition: WFL- intact  Swallow: tolerating regular diet with thin liquids without difficulty -- had VFSS on 2/24- no aspiration and was advanced at that time to regular diet. Pt using safe swallow strategies independently- swallow goals met.     Assessment: pt seen for swallow tx- meal follow-up on current regular diet with thin liquids. Pt tolerating well without overt difficulty and no s/s of aspiration. Pt does tend to eat more rapidly but despite this pt tolerating well. Pt knows to use double swallow as needed and to alternate solids with liquids and knows to always have PMSV on for po intake. No further follow-up needed for swallowing- continue with current regular diet and SLP will sign off of swallow goals.     As pt is independent with PMSV ( Passy musa speaking valve) management and wearing throughout the day-- will decrease to seeing pt just 1-2 times per week for check ins until Pulmonology team sees pt. Have notified MD for pulmonology team to weigh in if pt is ready for trach capping. If pt deemed ready then SLP can initiate and monitor tolerance along with RT, medical team. As pt is wearing PMSV throughout the day without difficulty and O2 sats continue to  be  maintained at 98-99% with iPMSV on, then seems ready for next steps of trach capping if pulmonology team feels pt is ready     Other Barriers to Discharge (Family Training, etc): will need training in trach cares if pt is not decannulated before d/c

## 2021-02-28 NOTE — PROGRESS NOTES
Pulmonary CF/ Transplant Brief Note  February 28, 2021      Tacrolimus level remains supratherapeutic (and further elevated) at 15.1 today (11 hr level) despite holding AM dose yesterday and decreasing dose. Goal level 8-10. Will decrease dose again today, from 2 mg BID to 2 mg qAM/ 1.5 mg qPM (ordered). Recheck level tomorrow to trend, and steady state on 3/4 as prior ordered (M/Th).     Patient discussed with Dr. Fermin Ibarra, ADRIÁN, CNP   Inpatient Nurse Practitioner  Pulmonary CF/Transplant  Pager [phone]

## 2021-02-28 NOTE — PROGRESS NOTES
Individualized Overall Plan Of Care (IOPOC)      Rehab diagnosis/Impairment Group Code: Pulmonary 10.9 other pulmonary: ards and septic shock in setting of prior b lung transplant  Respiratory failure (h)       Expected functional outcome:  Patient has good functional prognosis with expected modified independent for mobility and ADLs with family assist.    Clinical Impression Comments:     Mobility: Pt should benefit well from intensive skilled PT during ARU to address strength, balance, and CV endurance impairments s/p pneumonia with ARDS s/p intubation. Expect patient to be able to discharge to home with family, mod I, with transition to OP PT.    ADL: Pt would benefit from skilled OT to address ADL/IADL performance deficits and address goals in POC to maximize IND and safety in discharge environment.     Communication/Cognition/Swallow: Completed  communication eval for PMSV tolerance per MD orders. Pt currently has a #6 Shiley trach- cuffless. Pt was already assessed for PMSV ( speacking valve use)- whle in hospital and has advanced to independent use of PMSV while still in hospital. pt maitains O2 sats at 96% while wearing PMSV. At the time of this eval- pt had already been wearing the PMSV for 3-4 hours without difficulty. Pt reports that she has been wearing it daily - all day until she goes to sleep. Pt is able to demonstate indepednece with donning and doffing PMSV and is able to relay the care for it for clearing. Further pt is able to state safety guidelines for wearing PMSV: removal while sleeping. removal if she becomes SOB or if there is increased WOB.or if O2 sats drop below 90%. Further pt demosntrates adequate voicing- stonge, loud and intelligible. pt also knows to wear PMSV for all po intake. At this point- SLP will plan to just check in 2x per week to assure that PMSV tolerance is maintained- Pt shoul dcontinue to wear throguht the day as tolerated  and as noted above- remove when sleeping or if  she becomes fatigued, SOB or if O2 sats drop below 90%- pt will wear nasal cannula during day and humidification sourse in evening. At this point pt would be likely ready to trial trach capping - but MD will consult with pulmonology team for further next step direction with this.     Intensity of therapy:   PT 60 minutes, Daily, for 7 days  OT 60 minutes, Daily, for 7-10  SLP 60 minutes, 2 times/wk, for 1 week      Education tube feeding and wound care, antibiotics  Neuropsychology Testing: Yes        Medical Prognosis:   Patient will continue to need albuterol, mucomyst, pulmicort multiple times daily. She is getting weekly CXRs and will need outpatient repeat bronchoscopy. Anticipate ongoing medical needs after discharge, though prognosis for medical management is good with family support.      Physician summary statement: Anticipate continued recovery with progression in managing medications and ongoing medical/lab test requirements in the setting of her bilateral lung transplant. Functionally expect modified independence with family assistance as needed. Barriers to discharge include inability to do stairs, fatigue with mobility and ambulation  Discharge destination: prior home and family  Discharge rehabilitation needs: outpatient, RN, PT, OT and SLP      Estimated length of stay: 7-10 days      Rehabilitation Physician Angelika Castellanos MD

## 2021-02-28 NOTE — PROVIDER NOTIFICATION
Writer spoke with on call PMR MD Chavarria and confirmed patient will have follow up labs in AM, no labs needed this evening.

## 2021-02-28 NOTE — PROGRESS NOTES
[facility], [place]   Physical Medicine and Rehabilitation Daily Note           Assessment and Plan of Care:   Kecia Blue is a 60 year old yo female with past medical history of HTN, ESRD on dialysis, ILD s/p bilateral lung transplant 03/2018 with complicated postoperative course including: right mainstem bronchial stenosis aspergillus empyema, EBV viremia, CMV viremia.  Kecia was admitted to an outside hospital on 1/22 with acute hypoxemic respiratory failure and new lung opacities on imaging, was ultimately diagnosed with PJP pneumonia, she was intubated and transferred to [facility] on 1/24, course further complicated by ARDS on 2/3, prolonged hypoxic respiratory failure s/p tracheostomy on 2/12 and PEG-J on 2/16, underwent dilation of right main bronchus on 2/19. Admitted to acute rehab unit 2/25/21 for ongoing rehabilitation and medical management.      --Vitals stable. BMP this AM with K normalized - 4.7  --Continue ongoing medical management.  --Continue therapies and plan of care.             Interval history:   Nursing notes reviewed, no acute overnight events. Patient reports she slept well and feels great. Did have a softer BP this AM. Denies any dizziness or other symptoms - improved on recheck. Denies any other new symptoms or concerns.             Physical Exam:     Vitals:    02/28/21 0633 02/28/21 0734 02/28/21 0808 02/28/21 0916   BP:  (!) 86/52 92/58 93/55   BP Location:  Right arm Right arm Right arm   Pulse:  103 101 101   Resp:  20 20    Temp:  97.3  F (36.3  C)     TempSrc:  Oral     SpO2:  98% 99%    Weight: 50.9 kg (112 lb 4.8 oz)      Height:         Gen: NAD, sitting in chair  CV: RRR  Resp: Non-labored breathing on 2L O2 per NC. Mildly coarse breath sounds bilaterally.   Ext: wwp, no edema in BLE  MSK/neuro: Alert. Answers questions appropriately. Moves BUE and BLE volitionally.          Data:   Scheduled meds    acetylcysteine  2 mL Inhalation BID      albuterol  2.5 mg Nebulization 4x Daily     B and C vitamin Complex with folic acid  5 mL Oral Daily     budesonide  0.5 mg Nebulization BID     fiber modular (NUTRISOURCE FIBER)  1 packet Per Feeding Tube BID     insulin aspart  1-10 Units Subcutaneous TID AC     insulin aspart  1-7 Units Subcutaneous At Bedtime     insulin glargine  10 Units Subcutaneous QAM AC     metoprolol tartrate  25 mg Oral or Feeding Tube BID     OLANZapine  10 mg Oral or Feeding Tube At Bedtime     pantoprazole  40 mg Oral QAM AC     posaconazole  300 mg Oral TID     predniSONE  2.5 mg Oral QPM     predniSONE  5 mg Oral Daily     protein modular  1 packet Per Feeding Tube BID     sulfamethoxazole-trimethoprim  10 mL Oral or Feeding Tube Once per day on Mon Wed Fri     tacrolimus  2 mg Oral BID IS       PRN meds:  acetaminophen, dextrose, glucose **OR** dextrose **OR** glucagon, ondansetron, - MEDICATION INSTRUCTIONS -, prochlorperazine    Staff is Dr. Emanuel Chavarria MD  Physical Medicine and Rehabilitation Resident

## 2021-02-28 NOTE — PLAN OF CARE
A&O x4. Pt had no complaints/concerns requiring nursing intervention. Pt slept majority of shift. Blood glucose overnight was 168 mg/dL. Call light within reach and pt able to make needs known.

## 2021-02-28 NOTE — PLAN OF CARE
Patient is alert and oriented x4. Able to make needs known. BP this morning was 86/52 and triggered sepsis protocol. Pt was asymptomatic. Lactate came back 1.0. VS monitored and improved. K and Na resulted normal. Tacrolimus level was 15.1. Dose decreased to 1.5Mg.   Denied pain, denied CP. Denied sob or distress. Pt denied fever/chillis. A of 1 with walker and belt. Continent of bowel this morning on toilet.  Pt on dialysis. Fistula to [facility]. Had shower with OT. Trach care done and inner canula changed. On Oxygen at 2L/NC. oxygenation have been >98%. Denied nusea and diazines.  Great appetite this shift pt ate 100 of of breakfast and more than half at lunch. BGs were 120 and 122. GJ is patent. site cleaned and new drain dressing applied. Pt has been up on chair during meals and b/n therapies.  at bedside. No care concern this shift. Continue with POC.

## 2021-02-28 NOTE — PLAN OF CARE
Discharge Planner Post-Acute Rehab OT:      Discharge Plan: Home with spouse support, OP OT     Precautions: falls, tracheostomy, JPEG, O2 nasal canula     Current Status:  ADLs: SBA TB dressing while seated EOB after set up, Min A STS from standard toilet seat, SBA-CGA during STS from higher surfaces, SBA oral cares while standing EOS with fww, set up g/h seated in chair, UB bathing with set up, LB bathing CGA, shower transfer CGA using FWW.   IADLs: Not formally assessed, family providing assistance with heavier IADL at baseline.   Vision/Cognition: Wears glasses, no concerns regarding vision or cognition at this time.      Assessment: Pt. completed shower and dressing routine today. Pt. ambulated from room to shower room using FWW with CGA. Pt. completed shower transfer using shower chair with CGA. Pt. completed washing using hand held shower and drying with intermittent standing to wash and dry bottom with CGA using grab bars. Pt. donned gown with set up. Pt. donned underwear with CGA and using FWW for standing to pull over hips. Pt. donned socks independently while seated. Pt. ambulated from shower room to room using FWW with CGA. Pt. completed LB dressing donning pants with CGA to stand to pull over hips using FWW. Pt. donned socks and shoes with set up. Pt. completed hair grooming while seated with set up. Pt. was on 2L O2 throughout.Pt limited by overall deconditioning and weakness following extended hospital stay recovering from pneumonia. Pt. Progressing well towards ADL and IADL goals.      Other Barriers to Discharge (DME, Family Training, etc): Equipment: pt has a walker and shower chair at home. Pt spouse is designated visitor, very supportive, family training will be beneficial prior to discharge.

## 2021-02-28 NOTE — PLAN OF CARE
"Patient is alert and oriented x4. Able to make needs known. VSS. Denied pain, denied sob, denied CP, denied nusea and vomiting this shift, denied fever or chills, denied dizziness. A of 1 with walker for transfers. Continent of bowel on toilet per pt report. Pt on dialysis. Fistula to left UE WNL. Potassium was 6.2 Am shift. Pt was treated per report. Will have AM recheck draw. Intermittent productive cough. Trach site clean, dry intact.  Pt declined trach care. Pt said, \"there is no need and I am tried from dialysis.\" trach site CDI. Pt on 2L/NC sating >98%. GJ tube site cleaned and new drain dressing applied. TF started at 7PM instead of 6PM per pt request.  Pt wanted to eat dinner first.Pt also came back from dialysis after 6pm. Regular renal diet, thin liquid. Pt ate every thin on her dinner. BG were 118 and 182. Turned and repositioned. Frequent safety rounds done. Alarm on for safety. Continue with POC.   "

## 2021-02-28 NOTE — PLAN OF CARE
"Discharge Planner Post-Acute Rehab PT:      Discharge Plan: Home with family and outpatient PT. ELOS 7 days (3/5/21 discharge).     Precautions: Falls, supplemental O2/monitor SpO2  Dialysis T/Th/Sat     Current Status:  Bed Mobility: IND  Transfer: CGA with FWW  Gait: Up to 200 ft with FWW and CGA  Stairs: 8x6\" steps with bilat HR and CGA, step-to pattern  Balance: Fair to good static without UE support. Requires at least single UE support for dynamic standing balance.     Assessment:  Pt with good tolerance to therapy today, maintains ~98% SPO2 on 2LPM with all activity. Performs stairs CGA and ambulation SBA w/ FWW up to 200'. Activities focused on general proximal strengthening. Pt okay to ambulate in hallway w/ , CGA with GB and FWW.  demonstrating good understanding of O2 line management.      Other Barriers to Discharge (DME, Family Training, etc): Pt has 4WW available for use.  "

## 2021-02-28 NOTE — PROGRESS NOTES
HEMODIALYSIS TREATMENT NOTE    Date: 2/27/2021  Time: 6:02 PM    Data:  Pre Wt: 50.9 kg (112 lb 3.4 oz)   Desired Wt: 47.9 kg   Post Wt: 48.1 kg (106 lb 0.7 oz)  Weight change: 2.8 kg  Ultrafiltration - Post Run Net Total Removed (mL): 2800 mL  Vascular Access Status: Fistula and Graft  patent  Dialyzer Rinse: Streaked  Total Blood Volume Processed: 94.4 L   Total Dialysis (Treatment) Time: 4   Dialysate Bath: K 2, Ca 2.5  Heparin 500 units loading + 500 units/hr    Lab:   No    Interventions:Assessment:  Writer assumed dialysis care with 2 hours left of tx. Tx complete. AVG and AVF utilized without complication. Thrill and bruit present. Goal reduced within the last 30 minutes d/t Afib alarm. Pt tachycardic during HD. 2.8 L of fluid obtained without c/o cramping or nausea. Venofer administered during HD per order, see MAR. Sites held for 10 minutes. Hand of report given to ARU nurse.     Plan:    Per nephrology team.

## 2021-02-28 NOTE — PROGRESS NOTES
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 She was admitted to [facility] 1/4-2/25/21 for acute hypoxic respiratory failure found to have PJP pneumonia w/ stay c/b ARDS requiring tracheostomy, PEG/J, and dilation of right main bronchus as well as a fib w/ RVR. Transferred to ARU for ongoing rehabilitation. IM following d/t transplant hx.     #History of Lung Transplant.  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 On 2/19/21 had right mainstem bronchus dilation.   - On immunosuppression w/ tacrolimus 2 mg BID (decreased 2/27 for supratherapeutic level - f/u 11 hr trough today is still high at 15.1 - fyi page sent to pulm, goal is 8-10) and prednisone 5 mg AM/2.5 mg PM (no further taper planned at this time).   - Continue prophylaxis w/ Bactrim Q MWF.   - CMP, CBC, Mg, phos, tacro level Q M/Th and CXR Q Monday.   - Pulmonology to follow here.   - Will have repeat bronch scheduled ~6 weeks (first week of April - TBD).      #Acute Hypoxemic Respiratory Failure s/p Tracheostomy.   #PJP Pneumonia.   Presented in respiratory failure and ultimately failed extubation trials x2 and developed ARDS. Required proning, inhaled epoprostenol, pressors. Tracheostomy done 2/12 and exchanged for #6 cuffless Shiley on 2/22. Completed steroid taper 2/17 and 21 d course of Bactrim 2/15. Was most recently transitioned from continuous trach dome to humidified 2-4L NC.  - Continue to wean supplemental O2 as able. This should be humidified when in use. TD can be  used PRN for additional humidity if needed for secretions.   - Trach to be covered w/ PMSV during the day and HME overnight. Will d/w pulm tomorrow if we can start capping trial. Suspect she may be able to de-cannulate soon.   - For now continue nebs (QID albuterol, BID Mucomyst, BID Pulmicort).      #History of Left Sided Aspergillus Empyema. Diagnosed 10/2019 s/p amphotericin beads and is on indefinite posaconazole. Last level 2/25 still very low but transitioned from suspension/feeding tube to PO dosing that should be taken w/ food.   - Continue posaconazole 300 mg PO TID.   - Next posaconazole level due 3/2.   - Will need OP ID f/u.      #History of CMV and EBV Viremias. Last CMV PCR 1/25 was <137, level from 2/25 still pending. EBV level 12/9/20 mildly elevated to 10K (log 4) from prior 3K (3.5 log) on 9/9/20, repeat (1/25/21) decreased to 5619 copies (log of 3.8), but increased on 2/22/21 to 75,709 (log of 4.9). This increase is likely secondary to acute illness and recent steroid burst.   - Q2week CMV PCR for now since recently stopping VGCV prophylactic course (next due 3/11).  - Next EBV PCR due 3/22.      #ESRD. Currently on HD Tu/Th/Sat (was biweekly prior to hospitalization) via AVF w/ partial graft that can be challenging to access. Had CRRT 2/5-2/8 d/t hemodynamic instability, otherwise has tolerated iHD (temp line removed 2/9). Was hyperkalemic 2/27 in setting of supratherapeutic tacro level, ESRD, Bactrim - wnl today after HD yesterday.   - Nephrology following for TTS HD.   - Has required occasional midodrine during HD.   - Ok for just low K diet rather than full renal diet to encourage PO intake.      #PAF. In setting of lung disease and critical illness. Short self limited runs in the hospital. Last echo 1/25/21 w/ normal LVEF and RV size/function, high RA pressure. CHADSVasc 1 for gender corresponding to 1.3% annual stroke risk indicating no need for long term oral anticoagulation (per 2/22 EP  "note). Despite this was on Eliquis upon arrival to ARU - this was discussed w/ patient and pulmonology and decision made to stop it. Her only DVT was line associated after transplant in 2018 and was not on anticoagulation prior to hospitalization. NSR on exam.   - Needs OP Ziopatch arranged at ARU discharge.   - Continue metoprolol 25 mg BID (reduced from 50 mg BID here d/t borderline BPs).   - No anticoagulation indicated.      #Anxiety. On high dose 10 mg HS Zyprexa. May be able to wean this while here.      #Steroid Induced Hyperglycemia. HgbA1c 6% on 1/24/21. Is on cycled TFs overnight and now taking PO. Steroid taper was completed 2/17. BG controlled on Lantus 10 units and \"high\" Novolog correction scale.  - Continue Lantus 10 units daily.   - Continue Novolog sliding scale AC/HS.   - Will follow BG trend.     IM will follow.     Ethel Hernadez PA-C  Hospitalist Service  Pager: [phone]  ________________________________________________________________    Subjective & Interval History:  Feeling much better today. No nausea. No issues with HD run yesterday. Low normal BP this AM but has not felt dizzy. Breathing stable.     Last 24 hour care team notes reviewed.   ROS: 4 point ROS (including Respiratory, CV, GI and ) was performed and negative unless otherwise noted in HPI.     Medications: Reviewed in EPIC.    Physical Exam:    Blood pressure 105/60, pulse 104, temperature 97.3  F (36.3  C), temperature source Oral, resp. rate 20, height 1.6 m (5' 3\"), weight 50.9 kg (112 lb 4.8 oz), last menstrual period 06/07/2014, SpO2 100 %.    GENERAL: Alert and oriented x 3. Sitting up in chair, appears comfortable. Pleasant and conversant.  at bedside.   HEENT: Anicteric sclera. Mucous membranes moist.   CV: RRR. S1, S2. No murmurs appreciated.   RESPIRATORY: Effort normal on 2L NC. Lungs CTAB with no wheezing, rales, rhonchi.   GI: Abdomen soft, non distended, non tender.   NEUROLOGICAL: No focal deficits. " Moves all extremities.    EXTREMITIES: No peripheral edema. Warm and well perfused.   SKIN: No jaundice. No rashes.

## 2021-03-01 ENCOUNTER — APPOINTMENT (OUTPATIENT)
Dept: OCCUPATIONAL THERAPY | Facility: CLINIC | Age: 61
End: 2021-03-01
Payer: COMMERCIAL

## 2021-03-01 ENCOUNTER — HOSPITAL ENCOUNTER (OUTPATIENT)
Facility: CLINIC | Age: 61
End: 2021-03-01
Attending: RADIOLOGY | Admitting: RADIOLOGY
Payer: COMMERCIAL

## 2021-03-01 ENCOUNTER — APPOINTMENT (OUTPATIENT)
Dept: PHYSICAL THERAPY | Facility: CLINIC | Age: 61
End: 2021-03-01
Payer: COMMERCIAL

## 2021-03-01 ENCOUNTER — APPOINTMENT (OUTPATIENT)
Dept: ULTRASOUND IMAGING | Facility: CLINIC | Age: 61
End: 2021-03-01
Attending: CLINICAL NURSE SPECIALIST
Payer: COMMERCIAL

## 2021-03-01 ENCOUNTER — APPOINTMENT (OUTPATIENT)
Dept: GENERAL RADIOLOGY | Facility: CLINIC | Age: 61
End: 2021-03-01
Attending: PHYSICIAN ASSISTANT
Payer: COMMERCIAL

## 2021-03-01 DIAGNOSIS — Z99.2 ESRD (END STAGE RENAL DISEASE) ON DIALYSIS (H): Primary | ICD-10-CM

## 2021-03-01 DIAGNOSIS — N18.6 ESRD (END STAGE RENAL DISEASE) ON DIALYSIS (H): Primary | ICD-10-CM

## 2021-03-01 LAB
ALBUMIN SERPL-MCNC: 2.8 G/DL (ref 3.4–5)
ALP SERPL-CCNC: 339 U/L (ref 40–150)
ALT SERPL W P-5'-P-CCNC: 49 U/L (ref 0–50)
ANION GAP SERPL CALCULATED.3IONS-SCNC: 10 MMOL/L (ref 3–14)
ANION GAP SERPL CALCULATED.3IONS-SCNC: 11 MMOL/L (ref 3–14)
AST SERPL W P-5'-P-CCNC: 17 U/L (ref 0–45)
BILIRUB DIRECT SERPL-MCNC: 0.3 MG/DL (ref 0–0.2)
BILIRUB SERPL-MCNC: 0.7 MG/DL (ref 0.2–1.3)
BUN SERPL-MCNC: 82 MG/DL (ref 7–30)
BUN SERPL-MCNC: 93 MG/DL (ref 7–30)
CALCIUM SERPL-MCNC: 8.4 MG/DL (ref 8.5–10.1)
CALCIUM SERPL-MCNC: 8.8 MG/DL (ref 8.5–10.1)
CHLORIDE SERPL-SCNC: 92 MMOL/L (ref 94–109)
CHLORIDE SERPL-SCNC: 92 MMOL/L (ref 94–109)
CO2 SERPL-SCNC: 23 MMOL/L (ref 20–32)
CO2 SERPL-SCNC: 26 MMOL/L (ref 20–32)
CREAT SERPL-MCNC: 3.45 MG/DL (ref 0.52–1.04)
CREAT SERPL-MCNC: 3.69 MG/DL (ref 0.52–1.04)
ERYTHROCYTE [DISTWIDTH] IN BLOOD BY AUTOMATED COUNT: 19.4 % (ref 10–15)
GFR SERPL CREATININE-BSD FRML MDRD: 13 ML/MIN/{1.73_M2}
GFR SERPL CREATININE-BSD FRML MDRD: 14 ML/MIN/{1.73_M2}
GLUCOSE BLDC GLUCOMTR-MCNC: 132 MG/DL (ref 70–99)
GLUCOSE BLDC GLUCOMTR-MCNC: 150 MG/DL (ref 70–99)
GLUCOSE BLDC GLUCOMTR-MCNC: 150 MG/DL (ref 70–99)
GLUCOSE BLDC GLUCOMTR-MCNC: 175 MG/DL (ref 70–99)
GLUCOSE BLDC GLUCOMTR-MCNC: 97 MG/DL (ref 70–99)
GLUCOSE SERPL-MCNC: 117 MG/DL (ref 70–99)
GLUCOSE SERPL-MCNC: 188 MG/DL (ref 70–99)
HCT VFR BLD AUTO: 29.6 % (ref 35–47)
HGB BLD-MCNC: 9.5 G/DL (ref 11.7–15.7)
MAGNESIUM SERPL-MCNC: 1.6 MG/DL (ref 1.6–2.3)
MCH RBC QN AUTO: 32 PG (ref 26.5–33)
MCHC RBC AUTO-ENTMCNC: 32.1 G/DL (ref 31.5–36.5)
MCV RBC AUTO: 100 FL (ref 78–100)
PHOSPHATE SERPL-MCNC: 5 MG/DL (ref 2.5–4.5)
PLATELET # BLD AUTO: 311 10E9/L (ref 150–450)
POTASSIUM SERPL-SCNC: 5.5 MMOL/L (ref 3.4–5.3)
POTASSIUM SERPL-SCNC: 5.5 MMOL/L (ref 3.4–5.3)
PROT SERPL-MCNC: 6.8 G/DL (ref 6.8–8.8)
RBC # BLD AUTO: 2.97 10E12/L (ref 3.8–5.2)
SODIUM SERPL-SCNC: 126 MMOL/L (ref 133–144)
SODIUM SERPL-SCNC: 128 MMOL/L (ref 133–144)
TACROLIMUS BLD-MCNC: 15.7 UG/L (ref 5–15)
TME LAST DOSE: ABNORMAL H
WBC # BLD AUTO: 5.6 10E9/L (ref 4–11)

## 2021-03-01 PROCEDURE — 999N000157 HC STATISTIC RCP TIME EA 10 MIN

## 2021-03-01 PROCEDURE — 80048 BASIC METABOLIC PNL TOTAL CA: CPT | Performed by: PHYSICIAN ASSISTANT

## 2021-03-01 PROCEDURE — 84100 ASSAY OF PHOSPHORUS: CPT | Performed by: PHYSICIAN ASSISTANT

## 2021-03-01 PROCEDURE — 80076 HEPATIC FUNCTION PANEL: CPT | Performed by: PHYSICIAN ASSISTANT

## 2021-03-01 PROCEDURE — 83735 ASSAY OF MAGNESIUM: CPT | Performed by: PHYSICIAN ASSISTANT

## 2021-03-01 PROCEDURE — 93990 DOPPLER FLOW TESTING: CPT | Mod: 26 | Performed by: RADIOLOGY

## 2021-03-01 PROCEDURE — 99233 SBSQ HOSP IP/OBS HIGH 50: CPT | Performed by: PHYSICIAN ASSISTANT

## 2021-03-01 PROCEDURE — 97110 THERAPEUTIC EXERCISES: CPT | Mod: GP | Performed by: PHYSICAL THERAPIST

## 2021-03-01 PROCEDURE — 250N000009 HC RX 250: Performed by: PHYSICIAN ASSISTANT

## 2021-03-01 PROCEDURE — 71046 X-RAY EXAM CHEST 2 VIEWS: CPT | Mod: 26 | Performed by: RADIOLOGY

## 2021-03-01 PROCEDURE — 250N000012 HC RX MED GY IP 250 OP 636 PS 637: Performed by: NURSE PRACTITIONER

## 2021-03-01 PROCEDURE — 80197 ASSAY OF TACROLIMUS: CPT | Performed by: PHYSICIAN ASSISTANT

## 2021-03-01 PROCEDURE — 250N000013 HC RX MED GY IP 250 OP 250 PS 637: Performed by: PHYSICIAN ASSISTANT

## 2021-03-01 PROCEDURE — 94640 AIRWAY INHALATION TREATMENT: CPT | Mod: 76

## 2021-03-01 PROCEDURE — 80048 BASIC METABOLIC PNL TOTAL CA: CPT | Performed by: CLINICAL NURSE SPECIALIST

## 2021-03-01 PROCEDURE — 99232 SBSQ HOSP IP/OBS MODERATE 35: CPT | Performed by: PHYSICIAN ASSISTANT

## 2021-03-01 PROCEDURE — 71046 X-RAY EXAM CHEST 2 VIEWS: CPT

## 2021-03-01 PROCEDURE — 94668 MNPJ CHEST WALL SBSQ: CPT

## 2021-03-01 PROCEDURE — 97750 PHYSICAL PERFORMANCE TEST: CPT | Mod: GP | Performed by: PHYSICAL THERAPIST

## 2021-03-01 PROCEDURE — 97535 SELF CARE MNGMENT TRAINING: CPT | Mod: GO

## 2021-03-01 PROCEDURE — 36415 COLL VENOUS BLD VENIPUNCTURE: CPT | Performed by: PHYSICIAN ASSISTANT

## 2021-03-01 PROCEDURE — 250N000012 HC RX MED GY IP 250 OP 636 PS 637: Performed by: PHYSICIAN ASSISTANT

## 2021-03-01 PROCEDURE — 999N001017 HC STATISTIC GLUCOSE BY METER IP

## 2021-03-01 PROCEDURE — 94640 AIRWAY INHALATION TREATMENT: CPT

## 2021-03-01 PROCEDURE — 93990 DOPPLER FLOW TESTING: CPT

## 2021-03-01 PROCEDURE — 36415 COLL VENOUS BLD VENIPUNCTURE: CPT | Performed by: CLINICAL NURSE SPECIALIST

## 2021-03-01 PROCEDURE — 272N000078 HC NUTRITION PRODUCT INTERMEDIATE LITER

## 2021-03-01 PROCEDURE — 85027 COMPLETE CBC AUTOMATED: CPT | Performed by: PHYSICIAN ASSISTANT

## 2021-03-01 PROCEDURE — 128N000003 HC R&B REHAB

## 2021-03-01 PROCEDURE — 99232 SBSQ HOSP IP/OBS MODERATE 35: CPT | Performed by: PHYSICAL MEDICINE & REHABILITATION

## 2021-03-01 RX ORDER — POSACONAZOLE 100 MG/1
300 TABLET, DELAYED RELEASE ORAL 3 TIMES DAILY
Status: DISCONTINUED | OUTPATIENT
Start: 2021-03-02 | End: 2021-03-01

## 2021-03-01 RX ORDER — SODIUM POLYSTYRENE SULFONATE 15 G/60ML
30 SUSPENSION ORAL; RECTAL ONCE
Status: COMPLETED | OUTPATIENT
Start: 2021-03-01 | End: 2021-03-01

## 2021-03-01 RX ORDER — SODIUM BICARBONATE 650 MG/1
1300 TABLET ORAL 3 TIMES DAILY
Status: DISCONTINUED | OUTPATIENT
Start: 2021-03-01 | End: 2021-03-01

## 2021-03-01 RX ORDER — HEPARIN SODIUM 1000 [USP'U]/ML
500 INJECTION, SOLUTION INTRAVENOUS; SUBCUTANEOUS
Status: DISCONTINUED | OUTPATIENT
Start: 2021-03-01 | End: 2021-03-01

## 2021-03-01 RX ORDER — HEPARIN SODIUM 1000 [USP'U]/ML
500 INJECTION, SOLUTION INTRAVENOUS; SUBCUTANEOUS CONTINUOUS
Status: DISCONTINUED | OUTPATIENT
Start: 2021-03-01 | End: 2021-03-01

## 2021-03-01 RX ADMIN — PREDNISONE 2.5 MG: 2.5 TABLET ORAL at 21:56

## 2021-03-01 RX ADMIN — Medication 5 ML: at 08:10

## 2021-03-01 RX ADMIN — POSACONAZOLE 300 MG: 100 TABLET, COATED ORAL at 17:17

## 2021-03-01 RX ADMIN — POSACONAZOLE 300 MG: 100 TABLET, COATED ORAL at 21:55

## 2021-03-01 RX ADMIN — PANTOPRAZOLE SODIUM 40 MG: 40 TABLET, DELAYED RELEASE ORAL at 06:51

## 2021-03-01 RX ADMIN — OLANZAPINE 10 MG: 5 TABLET, FILM COATED ORAL at 21:56

## 2021-03-01 RX ADMIN — Medication 1 PACKET: at 21:53

## 2021-03-01 RX ADMIN — METOPROLOL TARTRATE 25 MG: 25 TABLET, FILM COATED ORAL at 21:56

## 2021-03-01 RX ADMIN — TACROLIMUS 2 MG: 5 CAPSULE ORAL at 08:10

## 2021-03-01 RX ADMIN — INSULIN ASPART 1 UNITS: 100 INJECTION, SOLUTION INTRAVENOUS; SUBCUTANEOUS at 12:23

## 2021-03-01 RX ADMIN — Medication 1 PACKET: at 08:10

## 2021-03-01 RX ADMIN — PREDNISONE 5 MG: 5 TABLET ORAL at 08:06

## 2021-03-01 RX ADMIN — ACETYLCYSTEINE 2 ML: 100 INHALANT RESPIRATORY (INHALATION) at 20:26

## 2021-03-01 RX ADMIN — SODIUM POLYSTYRENE SULFONATE 30 G: 15 SUSPENSION ORAL; RECTAL at 17:20

## 2021-03-01 RX ADMIN — SULFAMETHOXAZOLE AND TRIMETHOPRIM 80 MG: 200; 40 SUSPENSION ORAL at 08:08

## 2021-03-01 RX ADMIN — INSULIN GLARGINE 10 UNITS: 100 INJECTION, SOLUTION SUBCUTANEOUS at 08:03

## 2021-03-01 RX ADMIN — Medication 1 PACKET: at 08:07

## 2021-03-01 RX ADMIN — ALBUTEROL SULFATE 2.5 MG: 2.5 SOLUTION RESPIRATORY (INHALATION) at 20:26

## 2021-03-01 RX ADMIN — ALBUTEROL SULFATE 2.5 MG: 2.5 SOLUTION RESPIRATORY (INHALATION) at 10:03

## 2021-03-01 RX ADMIN — METOPROLOL TARTRATE 25 MG: 25 TABLET, FILM COATED ORAL at 08:06

## 2021-03-01 RX ADMIN — BUDESONIDE 0.5 MG: 0.5 INHALANT RESPIRATORY (INHALATION) at 20:26

## 2021-03-01 RX ADMIN — BUDESONIDE 0.5 MG: 0.5 INHALANT RESPIRATORY (INHALATION) at 10:03

## 2021-03-01 RX ADMIN — POSACONAZOLE 300 MG: 100 TABLET, COATED ORAL at 08:09

## 2021-03-01 RX ADMIN — INSULIN ASPART 1 UNITS: 100 INJECTION, SOLUTION INTRAVENOUS; SUBCUTANEOUS at 08:03

## 2021-03-01 RX ADMIN — ACETYLCYSTEINE 2 ML: 100 INHALANT RESPIRATORY (INHALATION) at 10:03

## 2021-03-01 ASSESSMENT — MIFFLIN-ST. JEOR: SCORE: 1059.13

## 2021-03-01 NOTE — PROGRESS NOTES
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 She was admitted to [facility] 1/4-2/25/21 for acute hypoxic respiratory failure found to have PJP pneumonia w/ stay c/b ARDS requiring tracheostomy, PEG/J, and dilation of right main bronchus as well as a fib w/ RVR. Transferred to ARU for ongoing rehabilitation. IM following d/t transplant hx.     #Hyperkalemia - Suspect multifactorial d/t fistula clotting, supratherapeutic Tacrolimus levels, ESRD, and Bactrim. K 5.5 today, planning for HD as a result which was unable to be performed d/t fistula clotting. Repeat K stable at 5.5 this afternoon.  - Give 30 g Kayexalate per d/w Nephrology.  - Low K diet  - Holding Tacro tonight and reducing dose 3/2 as below  - Trend BMP in AM  - Plan for HD tomorrow after fistulogram    #Hyponatremia - Likely due to ESRD. Sodium 128 this AM, 126 on recheck this afternoon. Unable to dialyze d/t fistula clot.  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 On 2/19/21 had right mainstem bronchus dilation. Recent supratherapeutic Tacrolimus levels, last 15.7 on 3/1. Goal 8-10.  - Hold Tacrolimus tonight. Resume tomorrow at decreased dose of 1.5 mg BID. Monitor daily levels.  - Prednisone 5 mg AM/2.5 mg PM (no further taper planned at this time).   - Continue prophylaxis w/ Bactrim Q MWF.   - CMP, CBC, Mg, Phos Q M/Th and CXR Q Monday.   - Pulmonology to follow  - Repeat  bronch in ~6 weeks (first week of April - TBD).      #Acute Hypoxemic Respiratory Failure s/p Tracheostomy  #PJP Pneumonia  Presented in respiratory failure and ultimately failed extubation trials x2 and developed ARDS. Required proning, inhaled epoprostenol, pressors. Tracheostomy done 2/12 and exchanged for #6 cuffless Shiley on 2/22. Completed steroid taper 2/17 and 21 d course of Bactrim 2/15. Transitioned from continuous trach dome to humidified 1-2L NC.  - Continue to wean supplemental O2 as able. This should be humidified when in use. TD can be used PRN for additional humidity if needed for secretions.   - Trach to be covered w/ PMSV during the day and HME overnight. D/w pulm and can start capping trial. Suspect she may be able to de-cannulate soon.   - Continue QID Albuterol, BID Mucomyst, BID Pulmicort nebs.      #History of Left Sided Aspergillus Empyema - Diagnosed 10/2019 s/p amphotericin beads and is on indefinite posaconazole. Last level 2/25 still very low but transitioned from suspension/feeding tube to PO dosing that should be taken w/ food.   - Continue Posaconazole 300 mg PO TID.   - Posaconazole level due 3/2.   - OP ID f/u.     #ESRD - Currently on HD Tu/Th/Sat (was biweekly prior to hospitalization) via AVF w/ partial graft that can be challenging to access. Had CRRT 2/5-2/8 d/t hemodynamic instability, otherwise has tolerated iHD (temp line removed 2/9). Unable to dialyze today (for hyperK) due to fistula clotting.  - IR Fistulogram planned for 3/2 with HD run following.  - Nephrology following  - Has required occasional midodrine during HD.      #History of CMV and EBV Viremias - Last CMV PCR 2/25 undetected. EBV level 2/22/21 increased to 75,709 (log of 4.9) from 5K (log 3.8) on 1/25/21. This increase is likely secondary to acute illness and recent steroid burst.   - CMV PCR q 2 weeks (next 3/11) since recently stopping VGCV prophylactic course   - Next EBV PCR due 3/22.     #PAF - In  "setting of lung disease and critical illness. Short self limited runs in the hospital. Echo 1/25/21 w/ normal LVEF and RV size/function, high RA pressure. CHADSVasc 1 with no need for long term oral anticoagulation (per 2/22 EP note). Despite this, was on Eliquis upon arrival to ARU which was discontinued after discussion with patient and pulmonology. Her only DVT was line associated after transplant in 2018, not on AC prior to hospitalization. NSR on exam.   - Continue Metoprolol 25 mg BID (reduced from 50 mg BID here d/t borderline BPs).   - Needs OP Ziopatch arranged at ARU discharge.      #Steroid and TF Induced Hyperglycemia - A1c 6% on 1/24/21. On cycled TFs overnight and now taking PO. Steroid taper completed 2/17. BG controlled on Lantus 10 units and \"high\" Novolog correction scale.  - Discontinue Lantus on 3/2 with stopping of TF.  - Continue Novolog sliding scale AC/HS.   - If hyperglycemic off Lantus, consider starting NPH 6 units with AM dose of Prednisone    #Non-Severe Malnutrition - In setting of chronic illness. TF initiated during hospitalization d/t intubation. PEG-J placed 2/16 by IR. Now meeting caloric goals with PO intake alone.  - Continue TF through tonight (as patient received Lantus this AM), then discontinue.  - 2 g K diet + PO supplements  - PEG-J to remain in place for 3 months (remove ~5/16)    #Anxiety - On Zyprexa 10 mg q HS. Wean as able.      MERRICK Tucker  Hospitalist Service  [facility]  Contact information available via [facility] Paging/Directory  ____________________________________________________________________    Interval History  Doing well today. Breathing comfortably. Occasional cough. Chronic loose stools unchanged from baseline. Produces some urine on HD, no dysuria. Denies chest pain, SOB, n/v or abdominal pain.    Data reviewed today: I reviewed all medications, new labs and imaging results over the last 24 " hours.    Physical Exam  Vital Signs: Temp: 96.4  F (35.8  C) Temp src: Oral BP: 130/76 Pulse: 93   Resp: 18 SpO2: 100 % O2 Device: Nasal cannula Oxygen Delivery: 1 LPM  Weight: 112 lbs 6.95 oz  General: Awake. Sitting up in chair. NAD.  at bedside.  HEENT: Anicteric sclera. MMM. Trach in place.  CV: RRR  Respiratory: Normal effort on 1L via NC. Lungs CTAB.  GI: Abdomen is soft, non-tender, and non-distended. Bowel sounds present.  Extremities: No peripheral edema. Warm and well perfused.  Skin: No rashes or jaundice on exposed areas.

## 2021-03-01 NOTE — PLAN OF CARE
Pt is alert and oriented x 4, she denied pain or discomfort. She needed contact guard assist for transfer and ambulation to short distances with nursing. Vital signs has been stable, she is on 1 litre oxygen at rest and up to 2 litre when moving. She has a trach, care was done prior to leaving to extra dialysis run due to elevated potassium level. Tube feeding site dressing is clean, dry, and intact. Tube feeding is on hold and she is on calorie count. We will continue to monitor trach, tube feeding site and continue to assist with activity of daily livings.    1344 Since pt had Lantus insulin this morning, it was decided to have her tube feeding tonight.

## 2021-03-01 NOTE — PLAN OF CARE
A&O x4. Pt had no complaints/concerns requiring nursing intervention. Pt slept majority of shift. Blood glucose overnight was 175 mg/dL. Call light within reach and pt able to make needs known.

## 2021-03-01 NOTE — PROGRESS NOTES
ARU Care Coordinator Progress Note    Admission date: 2/25/2021    Data: Kecia Blue is a 59 yo female on ARU for rehab, tube feeding, trach cares following hospitalization for ARDS and septic shock. She has a hx of lung transplant in 2018.    Intervention: Met with  (patient was away at dialysis) to introduce the role of Care Coordinator and to begin discussion of anticipated discharge planning needs.    Assessment: Much TBD about discharge needs as many current needs are hoped to be eliminated by the time of discharge. She has tracheostomy, will work on capping trials with hope to decannulate this week. If not able to decannulate will need trach teaching (PLC scheduled 3/5) and supplies ordered. She has a GJ tube, has been on TF, which is now on hold as calorie counts continue. She hopes to wean from TF. If she needs home enteral will need teaching (PLC scheduled 3/3) and home enteral referral. She has been on insulin along with TF, which is also likely to discontinue if she remains off TF. Will need diabetes/insulin teaching if needed at home. Patient has a neb machine at home already. She goes to dialysis routinely at [facility] when home. Patient has stated she would like to do outpatient therapy, does not want home care, will await ARU therapy recommendations and discuss with patient, make home care referral if needed. Patient lives with , who is supportive and able to assist at home as needed.    Plan: Will continue to follow discharge planning needs throughout ARU stay. Target discharge date is 3/5/2021.    Patrick Fritz RN, BSN, Patient Care Management Coordinator  [facility]  [address], [address] [place] [address]  [email or web address]  [email or web address]   Desk: [phone] [address] [phone] Fax [phone] Pager [phone]

## 2021-03-01 NOTE — PLAN OF CARE
Discharge Planner Post-Acute Rehab OT:      Discharge Plan: Home with spouse support, OP OT     Precautions: falls, tracheostomy, JPEG, O2 nasal canula     Current Status:  ADLs: SBA TB dressing while seated EOB after set up, Min A STS from standard toilet seat, SBA-CGA during STS from higher surfaces, SBA oral cares while standing EOS with fww, set up g/h seated in chair, UB bathing with set up, LB bathing CGA, shower transfer CGA using FWW.   IADLs: Not formally assessed, family providing assistance with heavier IADL at baseline.   Vision/Cognition: Wears glasses, no concerns regarding vision or cognition at this time.      Assessment: Pt and  educated in energy conservation with imagery theory of spoons  To increase understanding of concept with adls , pt and  able to verbalize and demo technique, pt and  also educated in use of sense of smell to increase automatic plb with adls pt and  able to verbalize and demo technique and able to verbalize and demo with PT and second OT tx session  Pt. Progressing well towards ADL and IADL goals.      Other Barriers to Discharge (DME, Family Training, etc): Equipment: pt has a walker and shower chair at home. Pt spouse is designated visitor, very supportive, family training will be beneficial prior to discharge.

## 2021-03-01 NOTE — CONSULTS
"    Interventional Radiology Consult Service Note    IR consulted for possible LUE fistula declot    This is a 60 year old yo female with past medical history of HTN, ESRD on dialysis, ILD s/p bilateral lung transplant 03/2018 with complicated postoperative course including: right mainstem bronchial stenosis aspergillus empyema, EBV viremia, CMV viremia. Kecia was admitted to an outside hospital on 1/22 with acute hypoxemic respiratory failure and new lung opacities on imaging, was ultimately diagnosed with PJP pneumonia, she was intubated and transferred to [facility] on 1/24, course further complicated by ARDS on 2/3, prolonged hypoxic respiratory failure s/p tracheostomy on 2/12 and PEG-J on 2/16, underwent dilation of right main bronchus on 2/19. Admitted to acute rehab unit 2/25/21 for ongoing rehabilitation and medical management. Pt came over to the [facility] 3/1 for dialysis and her fistula was found to be clotted. IR is consulted for urgent declot.     Case was reviewed with Dr. Baugh from IR. This is a LUE brachiocephalic fistula created 9/2020 and revised in 11/2020 due to steal syndrome. US is pending. IR offered to place NTCVC for urgent dialysis today and will plan for fistula declot 3/2 (as an outpatient). Nephrology has reportedly deferred non-tunneled CVC and will wait for declot tomorrow.     Recommendations were reviewed with SCOTT Escobar.    Vitals:   /63 (BP Location: Right arm)   Pulse 98   Temp 97.2  F (36.2  C) (Oral)   Resp 20   Ht 1.6 m (5' 3\")   Wt 51 kg (112 lb 7 oz)   LMP 06/07/2014 (Exact Date)   SpO2 98%   BMI 19.92 kg/m      Pertinent Labs:     Lab Results   Component Value Date    WBC 5.6 03/01/2021    WBC 4.2 02/27/2021    WBC 2.9 (L) 02/25/2021       Lab Results   Component Value Date    HGB 9.5 03/01/2021    HGB 9.8 02/27/2021    HGB 9.9 02/25/2021       Lab Results   Component Value Date     03/01/2021     02/27/2021     02/25/2021       Lab " Results   Component Value Date    INR 0.99 02/19/2021    PTT 28 02/12/2021       Lab Results   Component Value Date    POTASSIUM 5.5 (H) 03/01/2021        ADRIÁN Oneill CNP  Interventional Radiology   Pager: [phone]

## 2021-03-01 NOTE — PROGRESS NOTES
[facility]   Acute Rehabilitation Unit  Daily progress note    INTERVAL HISTORY  Kecia Blue was seen and examined this morning, seated in chair in her room.  Her  was present at the time of our exam.  Weekend therapy and progress notes reviewed.  No acute events reported, although she did trigger sepsis protocol again on 2/28 but lactate WNL (1.0).  Also had hypotension to 86/52 yesterday AM.  Today, patient reports that she is feeling well overall and making good progress with therapies.  She is eager to attempt weaning off supplemental oxygen, trach, tube feeds, and insulin in preparation for hopeful discharge later this week.  She denies any new concerns, other than the lab abnormalities and need for extra HD run today.    AM labs reviewed, noted to have ongoing/stable anemia, hyponatremia, hyperkalemia, elevated BUN and Cr, hyperglycemia, hypoalbuminemia, mild hypocalcemia, hyperphosphatemia, elevated alk phos and bili.  Given recurrent hyperkalemia, hospitalist discussed with nephrology and will go for additional HD run this afternoon.  RD noting improved oral intake and planning to hold TF with anticipation that these will not be necessary at discharge (home with minimal flushes and Gtube cares).  Discussed with hospitalist given currently on Lantus 10 units.  She received today's dose, so to avoid risk of hypoglycemia, will continue TF tonight, and begin holding Lantus tomorrow AM in preparation to hold TF.  Hospitalist also in contact with pulmonary transplant team who gave approval to initiate trach capping, will discuss with SLP.    Functionally, she is ambulating 200' with standby assist and tolerating all activity with 2L oxygen.  She is needing standby assist with dressing seated, min assist with sit to stand from standard toilet seat, standby assist for oral cares standing at edge of sink, set-up for grooming and hygiene in chair, contact guard  "assist for lower body bathing.  As patient is independent with passymuir speaking valve management, SLP will decrease to 1-2 times per week but can assist with trach capping if pulmonary approves initiating.    MEDICATIONS    sodium chloride 0.9%  250 mL Intravenous Once in dialysis     sodium chloride 0.9%  300 mL Hemodialysis Machine Once     acetylcysteine  2 mL Inhalation BID     albuterol  2.5 mg Nebulization 4x Daily     B and C vitamin Complex with folic acid  5 mL Oral Daily     budesonide  0.5 mg Nebulization BID     epoetin alisha-epbx (RETACRIT) inj ESRD  4,000 Units Intravenous Once in dialysis     fiber modular (NUTRISOURCE FIBER)  1 packet Per Feeding Tube BID     gelatin absorbable  1 each Topical During Hemodialysis (from stock)     heparin (porcine)  500 Units Hemodialysis Machine Once in dialysis     insulin aspart  1-10 Units Subcutaneous TID AC     insulin aspart  1-7 Units Subcutaneous At Bedtime     insulin glargine  10 Units Subcutaneous QAM AC     metoprolol tartrate  25 mg Oral or Feeding Tube BID     OLANZapine  10 mg Oral or Feeding Tube At Bedtime     pantoprazole  40 mg Oral QAM AC     posaconazole  300 mg Oral TID     predniSONE  2.5 mg Oral QPM     predniSONE  5 mg Oral Daily     sulfamethoxazole-trimethoprim  10 mL Oral or Feeding Tube Once per day on Mon Wed Fri     tacrolimus  1.5 mg Oral QPM     tacrolimus  2 mg Oral QAM        sodium chloride 0.9%, acetaminophen, dextrose, glucose **OR** dextrose **OR** glucagon, lidocaine (buffered or not buffered), lidocaine (buffered or not buffered), ondansetron, - MEDICATION INSTRUCTIONS -, prochlorperazine, - MEDICATION INSTRUCTIONS -     PHYSICAL EXAM  /63 (BP Location: Right arm)   Pulse 98   Temp 97.2  F (36.2  C) (Oral)   Resp 20   Ht 1.6 m (5' 3\")   Wt 50.9 kg (112 lb 4.8 oz)   LMP 06/07/2014 (Exact Date)   SpO2 98%   BMI 19.89 kg/m    Gen: awake, seated upright in chair, pleasant and cooperative, in NAD  HEENT: NCAT, " anicteric sclera, EOMI, MMM, trach with speaking valve in place  Cardio: RRR  Pulm: non-labored on 2L by NC  Abd: soft, non-tender, non-distended, PEG/J present  Ext: no peripheral edema   Neuro/MSK: AAOx3, responds appropriately, speech clear/fluent, strength 4-5 throughout    LABS  CBC RESULTS:   Recent Labs   Lab Test 03/01/21  0637 02/27/21  0853 02/25/21  0542   WBC 5.6 4.2 2.9*   RBC 2.97* 3.09* 3.15*   HGB 9.5* 9.8* 9.9*   HCT 29.6* 30.6* 32.0*    99 102*   MCH 32.0 31.7 31.4   MCHC 32.1 32.0 30.9*   RDW 19.4* 19.7* 20.5*    268 293     Last Basic Metabolic Panel:  Recent Labs   Lab Test 03/01/21  0637 02/28/21  0557 02/27/21  0628   * 133 124*   POTASSIUM 5.5* 4.7 6.2*   CHLORIDE 92* 96 89*   CO2 26 28 26   ANIONGAP 10 9 9   * 178* 218*   BUN 82* 46* 78*   CR 3.45* 2.19* 3.43*   GFRESTIMATED 14* 24* 14*   CHELSEY 8.4* 8.7 9.2       Rehabilitation - continue comprehensive acute inpatient rehabilitation program with multidisciplinary approach including therapies, rehab nursing, and physiatry following. See interval history for updates.      ASSESSMENT AND PLAN  Kecia Blue is a 60 year old yo female with past medical history of HTN, ESRD on dialysis, ILD s/p bilateral lung transplant 03/2018 with complicated postoperative course including: right mainstem bronchial stenosis aspergillus empyema, EBV viremia, CMV viremia.  Kecia was admitted to an outside hospital on 1/22 with acute hypoxemic respiratory failure and new lung opacities on imaging, was ultimately diagnosed with PJP pneumonia, she was intubated and transferred to [facility] on 1/24, course further complicated by ARDS on 2/3, prolonged hypoxic respiratory failure s/p tracheostomy on 2/12 and PEG-J on 2/16, underwent dilation of right main bronchus on 2/19. Admitted to acute rehab unit 2/25/21 for ongoing rehabilitation and medical management.      Acute hypoxemic respiratory failure  Sepsis in setting of PJP pneumonia, possible  secondary pneumonia  S/p tracheostomy placement (2/12/21)  Presented to outside hospital with acute hypoxemic respiratory failure secondary to PJP pneumonia. She failed extubation trials x2, developed ARDS.  Required proning, inhaled epoprostenol, pressors. required tracheostomy: 2/12. completed steroid course  2/17. recieved Bactrim X 21 days. s/p  Bronchoscopy on 2/19 (cultures showed Staph epidermidis)  and requiring only trach dome since.  - Hospitalist following, appreciate assistance  - Continue trach dome at bedtime & humidified via nasal cannula during day.  Keep covered with PMSV (day) or HME (Heat Moist Exchanger overnight)   - Continue #6 cuffless shiley per IP   - Continue Albuterol QID, Mucomyst BID, Pulmicort BID   - CXR weekly (next 3/8)  - F/u outpatient repeat bronchoscopy in 6 weeks (~4/2)  - Per hospitalist, pulm transplant ok'd for trach capping trial.  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 EBV viremia  Immunosuppression: Tacrolimus 2 mg qAM, 2.5 mg qPM via G tube.  Goal 8-10 (level on 2-25-21 is 8.1)   Prednisone 5 mg q am/2.5 mg po q PM (transitioned from burst steroids on 2-18-21)  Prophylaxis: Bactrim q M, W, F for PJP ppx  - CMV level q 2 weeks (VGCV for CMV ppx done through 2-25-21)  - EBV level q 4 weeks (due 3-22-21)  - Appreciate hospitalist and pulmonary transplant assistance      Aspergillus Empyema   -Continue posaconazole 300 mg TID, plan for indefinite course per ID. Levels per pulm recs (<0.2 on 2-25-21)  - F/u ID as outpatient     ESRD  Hyperkalemia  Prior to hospitalization, HD biweekly (M/Th) per [facility] nephrology. Required CRRT 2/4 to  2/8.  Access is AVF with partial graft, challenging to access.   - Appreciate ongoing nephrology support.   - Continue HD T/Th/Sat  - Hyperkalemia today to 5.5.  Plan for extra HD run, however issues with access.  Hospitalist and nephrology discussing repeat K level before return to ARU, with plan for HD tomorrow.     Acute on Chronic Anemia- Hgb 9.9 2/25 receiving EPO with dialysis  - Trend CBC  - Hgb stable at 9.5 today     Leukopenia- WBC 2.9 2/25 felt to be medication related  - Monitor CBC  - WBC improved to 5.6 today     Atrial fibrillation with RVR    Occurring intermittently, seen and evaluated by EP   - Follow up with EP outpatient with zio patch after discharge from ARU  - Continue metoprolol, apixaban.      Steroid/ Tube feed induced Hyperglycemia:  Hgb A1C 6.0% 1/24/21  - Lantus 10 units daily, Hold starting tomorrow for trial of holding TF  - sliding scale insulin   - Hypoglycemia protocol      Severe Malnutrition- in context of chronic illness.  s/p PEG-J tube 2/16/21  - Per discussion with manisha HOLLAND to begin holding TF given improving oral intake.  Patient already received Lantus this AM, so to avoid risk of hypoglycemia, will continue TF tonight and hold Lantus and TF starting tomorrow.  - Appreciate nutrition consult  - Continue renal diet.     Anxiety: continue bedtime and as needed olanzapine, hydroxyzine prn  - Attempt taper of Zyprexa as able prior to discharge     Hx anisocoria: no deficits on exam, negative CTH. Monitor.     HTN: holding pta meds (Coreg, amlodipine)  - Continue metoprolol  - Monitor BP      1. Adjustment to disability:  Clinical psychology to eval and treat as indicated  2. FEN: renal diet + TF  3. Bowel: continent, last BM 2/26  4. Bladder: continent, low urine output given ESRD, PVRs at admission: 1 so far at 0  5. DVT Prophylaxis: apixaban  6. GI Prophylaxis: PPI  7. Code: full, confirmed on admission  8. Disposition: home with family support  9. ELOS: 7-10 days  10. Follow up  Appointments on Discharge: EP cardiology with Zio patch prior to discharge.  ID for Posaconazole management for aspergillus empyema and history of CMV and EBV viremia, Pulmonology for repeat bronch (4-2-21), Nephrology for continued dialysis.      Patient seen and discussed with Dr. Shree Ford, PM&R Staff Physician    Lissett Rodriguez PA-C  Physical Medicine & Rehabilitation

## 2021-03-01 NOTE — PLAN OF CARE
"Discharge Planner Post-Acute Rehab PT:      Discharge Plan: Home with family and outpatient PT. QINGOS 7 days (3/5/21 discharge).     Precautions: Falls, supplemental O2/monitor SpO2  Dialysis T/Th/Sat     Current Status:  Bed Mobility: IND  Transfer: SBA with 4WW  Gait: Up to 200 ft with 4WW and SBA  Stairs: 8x6\" steps with bilat HR and CGA, step-to pattern  Balance: Fair to good static, fair dynamic standing balance without UE support.  JOHNSON on 3/1/21: 43/56, indicating increased risk for falls with recommendation for use of 4WW for mobility. Pt would benefit from additional PT to help address LE strength and balance deficits.     Assessment:  Pt able to demonstrate safe ambulation in hallway with 4WW/SBA with  assisting to manage O2 line. Attempted weaning from supplemental O2 today. Initially, patient able to maintain good saturation, generally  >= 95% with approx. 5 min of standing ex and consistent pursed lip breathing. However, with addition of ambulation, patient temporarily desaturates to 73% on RA, increasing to >= 90% within 10 sec of seated rest. Agreed to remain on 2 L/min O2 with activity, but may be on 0-1 L/min NC O2 at rest. FWW switched for 4WW per what she will be using for home. Demonstrates consistent safety and adequate balance for use.     Other Barriers to Discharge (DME, Family Training, etc): Pt has 4WW available for use.  "

## 2021-03-01 NOTE — PROGRESS NOTES
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  Patient was admitted to OSH on  for acute hypoxemic respiratory failure and new lung opacities. Intubated  and transferred to [facility] for ongoing management.  Extubated  but reintubated - for progressive hypoxemia.  Rapid decompensation 2/3 with ARDS presentation requiring reintubation, prone positioning, and inhaled epoprostenol, suspected d/t worsening PJP. S/p Trach , and PEG-J (). S/p dilation of right main by IP on . Discharged to ARU on  for ongoing rehabilitation. This is a phone visit secondary to the ongoing COVID-19 pandemic.      Recommendations:   - Tacrolimus level remains supra therapeutic for unknown reasons--hold dose tonight and resume tomorrow at reduced dose of 1.5 mg BID (ordered)  - Tacrolimus level daily to trend (ordered)  - Supplemental O2 with humidified NC, TD prn for additional humidity with tenacious secretions; trach weaning per SLP/RT/ARU  - Feeding tube will need to remain in place for 3 months    Acute hypoxemic respiratory failure:   Right post-obstructive Stenotrophomonas and Eikenella pneumonia:  PJP pneumonia:  Septic shock, Resolved:   Airway stenosis with distal plugging s/p right mainstem dilation ():  ARDS: Presented to OSH ED with increased SOB and hypoxia, intubated  with need for pressors, transferred to [facility].  PTA bronch (20) with moderate airway obstruction and  RML/RLL mucous plugging, cultures with Stenotrophomonas and Eikenella. OSH CT chest with new multifocal GGO bilaterally and increased left loculated pleural effusion. Repeat bronch (1/24) with RUL BAL with thick copious secretions to RML/RLL, PJP PCR positive. Extubated on 1/26, but reintubated from 1/27-1/29 for progressive hypoxemia. Eventually decompensated on 2/3 and again reintubated. Presentation consistent with ARDS (tx: prone positioning, inhaled epoprostenol, pressors).  CT chest (2/3) with progressive consolidation and possible superimposed infection findings, stable LLL chronic empyema.  Bronch on 2/11 with persistent right mainstem stenosis with moderate airway obstruction.  S/p trach 2/12 by IP, downsized to Shiley #6 (uncuffed) on 2/22. Bronch cultures (2/19) with Staph epidermidis (no indication to treat). S/p steroid burst with taper through 2/17 and 21 day Bactrim course for PJP through 2/15. TD exclusively 2/19, weaned off TD 2/24. No new pulmonary complaints, currently sating upper 90s on 1-2 L NC. Has not needed TD or suctioning for multiple days. CXR reviewed by me today demonstrates improved mixed opacities and improved right effusion/atelectasis.   - Supplemental O2 with humidified NC, TD prn for additional humidity with tenacious secretions  - Trach to be covered with PMSV during the day and HME overnight   - Nebs: albuterol QID, Mucomyst 10% 4 ml BID, and Pulmicort BID  - Repeat IP bronch in 6 weeks (~4/2)       S/p bilateral lung transplant for ILD 2/2 CTD: Last seen in pulmonary clinic 12/2. DSA (1/25) not detected.     Immunosuppression: On 2-drug IST d/t recurrent infections  - Tacrolimus 2 mg qAM / 2.5 mg qPM (via G tube only). Goal level 8-10. Levels have been supra therapeutic despite holding of doses; dose decreased yesterday from 2 mg BID to 2 mg qAM/ 1.5 mg qPM and again, level supra therapeutic at 15.7. Will hold dose again tonight and decrease dose to 1.5 mg BID starting  tomorrow; level to trend on Wed  - Chronic prednisone 5 mg qAM / 2.5 mg qPM      Prophylaxis:   - Bactrim qMWF for PJP ppx     Aspergillus empyema (left-sided): Noted 10/8/19, negative in November and again on admission. CT scan on 7/17/20 with increased mass-like density, likely pleural-based, in LLL area s/p needle aspiration (8/13/20) with Aspergillus fumigatus on cultures s/p intrapleural amphotericin bead placement (11/20). LFTs stable on admission (ALP chronically mildly elevated). CT chest with increased loculated left pleural effusion s/p thoracentesis (1/25), exudative with 87% neutrophils, very high LDL (6308), cytology normal, AFB pleural cultures NGTD-stain negative. ID following.  - Posaconazole 300 mg TID (indefinite course per ID), repeat level 3/2 to trend (levels run qTTh)   - F/u with ID as OP      H/o CMV viremia: CMV D+/R+. CMV (2/25) negative.  VGCV ppx with steroid burst, completed 2/23.  - CMV [id number] initially after completion of VGCV ppx course     EBV viremia: Level 12/9/20 mildly elevated to 10K (log 4) from prior 3K (3.5 log) on 9/9/20, repeat (1/25/21) decreased to 5619 copies (log of 3.8), but increased on 2/22/21 to 75,709 ( log of 4.9). This increase is likely secondary to acute illness and recent steroid burst.   - Recheck EBV in 4 weeks (3/22)    Ame Chow PA-C  Pulmonary CF/Transplant  4710      Interval History:     Has started therapy, good sessions yesterday after dialysis on Saturday. Sstill has trach in place, no suctioning and hasn't needed to use TD. Does have some coughing, but feels it's related to her nebs or in the am. Does feel like the nebs are still helping. Breathing is good, is winded with therapy, but as she would expect. No fever or chills, no nausea other than Saturday morning, then found out her K was high and felt better after dialysis. No vomiting, or abdominal pain. Stool are loose, but improved. Appetite is better, feels hungry.     Review of Systems:  "    Please see interval history, otherwise 10 point review is negative    Physical Exam:     Vital signs:  Temp: 97.2  F (36.2  C) Temp src: Oral BP: 109/63 Pulse: 98   Resp: 20 SpO2: 98 % O2 Device: Nasal cannula with humidification Oxygen Delivery: 2 LPM Height: 160 cm (5' 3\") Weight: 50.9 kg (112 lb 4.8 oz)  I/O:     Intake/Output Summary (Last 24 hours) at 2/25/2021 0715  Last data filed at 2/25/2021 0600  Gross per 24 hour   Intake 1490 ml   Output --   Net 1490 ml     No physical exam as this was a phone visit    Lines, Drains, and Devices:  Peripheral IV 02/16/21 Right;Posterior Lower forearm (Active)   Site Assessment WDL 02/25/21 0400   Line Status Saline locked 02/25/21 0400   Phlebitis Scale 0-->no symptoms 02/25/21 0400   Infiltration Scale 0 02/25/21 0400   Infiltration Site Treatment Method  None 02/24/21 1200   Number of days: 9       Hemodialysis Vascular Access Arteriovenous fistula Left Arm (Active)   Site Assessment WDL;Bruit present;Thrill present 02/25/21 0400   Cannulation Needle Size 15 02/23/21 1700   Dressing Intervention New dressing 02/23/21 1700   Dressing Status Clean, dry, intact 02/25/21 0400   Verification by x-ray Not applicable 02/21/21 1600   Hand Off Report Yes 02/23/21 1700   Number of days: 31     Data:     LABS    CMP:   Recent Labs   Lab 03/01/21  0637 02/28/21  0557 02/27/21  0853 02/27/21  0628 02/25/21  0542 02/24/21  0539   * 133  --  124* 132* 132*   POTASSIUM 5.5* 4.7  --  6.2* 5.4* 4.3   CHLORIDE 92* 96  --  89* 98 98   CO2 26 28  --  26 29 28   ANIONGAP 10 9  --  9 5 6   * 178*  --  218* 147* 140*   BUN 82* 46*  --  78* 65* 35*   CR 3.45* 2.19*  --  3.43* 3.25* 2.18*   GFRESTIMATED 14* 24*  --  14* 15* 24*   GFRESTBLACK 16* 28*  --  16* 17* 28*   CHELSEY 8.4* 8.7  --  9.2 9.0 9.1   MAG 1.6  --   --   --   --  1.7   PHOS 5.0*  --   --   --   --   --    PROTTOTAL 6.8  --  6.6*  --  6.6*  --    ALBUMIN 2.8*  --  2.5*  --  2.6*  --    BILITOTAL 0.7  --  0.4  --  " 0.4  --    ALKPHOS 339*  --  326*  --  345*  --    AST 17  --  16  --  15  --    ALT 49  --  44  --  41  --      CBC:   Recent Labs   Lab 03/01/21  0637 02/27/21  0853 02/25/21  0542 02/23/21  0513   WBC 5.6 4.2 2.9* 3.3*   RBC 2.97* 3.09* 3.15* 2.94*   HGB 9.5* 9.8* 9.9* 9.2*   HCT 29.6* 30.6* 32.0* 29.8*    99 102* 101*   MCH 32.0 31.7 31.4 31.3   MCHC 32.1 32.0 30.9* 30.9*   RDW 19.4* 19.7* 20.5* 20.8*    268 293 253       INR:   No lab results found in last 7 days.    Glucose:   Recent Labs   Lab 03/01/21  0637 03/01/21  0232 02/28/21  2114 02/28/21  1705 02/28/21  1121 02/28/21  0806 02/28/21  0557 02/28/21  0214 02/27/21  0628 02/27/21  0628 02/25/21  0542 02/25/21  0542 02/24/21  0539 02/24/21  0539 02/23/21  0513 02/23/21  0513   *  --   --   --   --   --  178*  --   --  218*  --  147*  --  140*  --  140*   BGM  --  175* 135* 117* 122* 120*  --  168*   < >  --    < >  --    < >  --    < >  --     < > = values in this interval not displayed.       Blood Gas: No lab results found in last 7 days.    Culture Data   No results for input(s): CULT in the last 168 hours.    Virology Data:   Lab Results   Component Value Date    FLUAH1 Not Detected 01/24/2021    FLUAH3 Not Detected 01/24/2021    TE3335 Not Detected 01/24/2021    IFLUB Not Detected 01/24/2021    RSVA Not Detected 01/24/2021    RSVB Not Detected 01/24/2021    PIV1 Not Detected 01/24/2021    PIV2 Not Detected 01/24/2021    PIV3 Not Detected 01/24/2021    HMPV Not Detected 01/24/2021    HRVS Negative 01/24/2021    ADVBE Negative 01/24/2021    ADVC Negative 01/24/2021    ADVC Negative 12/23/2020    ADVC Negative 10/07/2019       Historical CMV results (last 3 of prior testing):  Lab Results   Component Value Date    CMVQNT CMV DNA Not Detected 02/25/2021    CMVQNT <137 (A) 01/25/2021    CMVQNT 238 (A) 01/24/2021     Lab Results   Component Value Date    CMVLOG Not Calculated 02/25/2021    CMVLOG <2.1 01/25/2021    CMVLOG 2.4 (H)  01/24/2021       Urine Studies    Recent Labs   Lab Test 01/24/21  1729 10/21/19  2240   URINEPH 5.0 5.0   NITRITE Negative Negative   LEUKEST Moderate* Large*   WBCU 34* 115*       Most Recent Breeze Pulmonary Function Testing (FVC/FEV1 only)  FVC-Pre   Date Value Ref Range Status   12/09/2020 1.12 L    09/09/2020 1.56 L    03/09/2020 1.57 L    02/19/2020 1.78 L      FVC-%Pred-Pre   Date Value Ref Range Status   12/09/2020 34 %    09/09/2020 47 %    03/09/2020 48 %    02/19/2020 54 %      FEV1-Pre   Date Value Ref Range Status   12/09/2020 0.98 L    09/09/2020 1.10 L    03/09/2020 0.96 L    02/19/2020 0.99 L      FEV1-%Pred-Pre   Date Value Ref Range Status   12/09/2020 37 %    09/09/2020 42 %    03/09/2020 37 %    02/19/2020 38 %        IMAGING    Recent Results (from the past 48 hour(s))   XR Video Swallow with SLP or OT    Narrative    Examination:  Modified Barium Swallow Study with Speech Pathology,  performed 2/24/2021    Comparison: None.    History: Recent tracheostomy to 2/12/2021    Fluoroscopy time: 1.7 minutes.    Findings: Under fluoroscopic guidance, the patient was given orally  administered barium of varying consistencies in the presence of the  speech pathology service.  The oral phase was normal. Consistent delay  of swallow initiating at the piriform. There is flash penetration  without aspiration of thin liquids. No evidence for aspiration with  nectar thickened barium, pudding consistency, or barium coated  cracker. Mild pharyngeal residue.  Other findings: Degenerative changes of the cervical spine.      Impression    Impression:   1. Flash penetration without aspiration of thin liquids.   2. Consistent delay of swallow initiation.    Please see the speech pathologist report for further details regarding  the modified barium swallow study portion of the examination.    I, SUNITA ADKINS MD, attest that I was present in the procedure room  for the entire procedure.    I have personally reviewed  the examination and initial interpretation  and I agree with the findings.    SUNITA ADKINS MD

## 2021-03-01 NOTE — PROGRESS NOTES
HEMODIALYSIS TREATMENT NOTE    Date: 3/1/2021  Time: 2:30 PM    Data:  Pre Wt: 50.9 kg (112 lb 3.4 oz)   Desired    Post Wt:  (106 lb 0.7 oz)  Weight change  Ultrafiltration - Post Run Net Total Removed   Vascular Access Status: patent  Dialyzer Rinse:  Total Blood Volume Processed        Interventions:  No HD as fistula is clotted    Assessment:  ESRD patient in for regular HD. Clotted access and will go to IR tomorrow and get declot and run after. ARU aware     Plan:  Declot in am and HD to follow

## 2021-03-01 NOTE — PLAN OF CARE
Patient is alert and oriented x4. Able to make needs known. VSS. Denied pain, denied sob, denied CP, denied nusea and vomiting this shift, denied fever or chills, denied dizziness. A of 1 with walker for transfers. Pt now can walk with  on the unit per new order.  Continent of bowel on toilet AM shift. Pt on dialysis. Fistula to left UE WNL.  Trach site clean, dry intact.  Pt on 1L/NC sating >98%. Speaking valve during day. HME in place for night. GJ tube site CDI.  TF started at 7PM instead of 6PM per pt request.  Pt wanted to eat dinner first. She has been doing this for the last 3 days. Regular renal diet, thin liquid. Pt ate descent amount from dinner tray.  BG were 117 and 135 . Pt can turn  and reposition in bed independently. Frequent safety rounds done. Alarm on for safety. Continue with POC

## 2021-03-01 NOTE — PROGRESS NOTES
Nephrology Progress Note  03/01/2021            Kecia Blue is a 60 year old female with PMHx most significant for ILD with antisynthetase sydrome s/p BSLT 03/2018 (CMV D+/R+, EBV D+/R+) postoperatively complicated by Left mainstem bronchial stenosis s/p several dilations, left sided aspergillus empyema (10/2019), and CMV viremia who was intubated for HRF at OSH and transferred to [facility] for continued management. Nephrology consutled for dialysis needs.     Interval History :   Mrs Blue had recurrence of hyperkalemia this am, was brought over for extra HD run to treat.  Unfortunately her access was clotted on initiation.  Obtaining US, suspect she has a stenosis which is causing recirculation and therefore reduced potassium removal.  IR unable to do declot today but have her on the schedule for tomorrow.  In the interm, we are rechecking a BMP, can re-dose kayexalate if K is still 5.5 or higher.  No acidosis to correct, last blood glucose was 150 so no significant hyperkalemia due to this.       Assessment & Recommendations:   ESRD-Runs at [place] through [facility] group.  Has atypical HD schedule of Monday and Thursday, has AVF with partial graft which has been challenging to access per RN's.  Needed CRRT 2/5-2/8 due to hemodynamic instability but otherwise has run iHD.  Currently on TTS schedule.                    -Had planned HD today, unable with clotted access.                -Has L arm AVF/graft, somewhat challenging access.  Having issues with clotting today, planning IR intervention tomorrow.                 -Removed temp HD line on 2/9.        Volume status-Has minimal edema, had planned for 2-3L of UF as usual before issues with access.       Electrolytes/pH-K 5.5, bicarb 26, did get kayexalate this am before transport to [facility].        Ca/phos/pth-Ca 8.4, Mg and Phos mildly up last check.      Anemia-Hgb 9.5, last PRBC's 2/15.  Checked iron stores and sats 36% on 2/3, started EPO 4k  "with runs.       Nutrition-Nutren TF.       Seen and discussed with Dr Honeycutt     Recommendations were communicated to primary team via verbal communication.        ADRIÁN Lo CNS  Clinical Nurse Specialist  [phone]    Review of Systems:   I reviewed the following systems:  Gen: No fevers or chills  CV: No CP at rest  Resp: No SOB at rest  GI: No N/V    Physical Exam:   I/O last 3 completed shifts:  In: 500 [P.O.:380; NG/GT:120]  Out: -    /63 (BP Location: Right arm)   Pulse 98   Temp 97.2  F (36.2  C) (Oral)   Resp 20   Ht 1.6 m (5' 3\")   Wt 51 kg (112 lb 7 oz)   LMP 06/07/2014 (Exact Date)   SpO2 98%   BMI 19.92 kg/m       GENERAL APPEARANCE: Trached but has speaking valve in place.    EYES: no scleral icterus  Endo: no moon facies  Pulmonary: Intubated, proned, equal expansion.    CV: regular rhythm, normal rate  GI: soft, non distended  MS: no evidence of inflammation in joints, no muscle tenderness  :  No Basilio  SKIN: warm, dry, trace edema.    NEURO: No focal deficits.     Labs:   All labs reviewed by me  Electrolytes/Renal -   Recent Labs   Lab Test 03/01/21  0637 02/28/21  0557 02/27/21  0628 02/24/21  0539 02/24/21  0539 02/22/21  0527 02/22/21  0527 02/15/21  0418 02/15/21  0418 02/14/21  0336   * 133 124*   < > 132*   < > 131*   < > 136 136   POTASSIUM 5.5* 4.7 6.2*   < > 4.3   < > 4.7   < > 3.9 3.8   CHLORIDE 92* 96 89*   < > 98   < > 99   < > 100 100   CO2 26 28 26   < > 28   < > 27   < > 27 29   BUN 82* 46* 78*   < > 35*   < > 38*   < > 52* 32*   CR 3.45* 2.19* 3.43*   < > 2.18*   < > 2.53*   < > 2.75* 1.84*   * 178* 218*   < > 140*   < > 136*   < > 155* 149*   CHELSEY 8.4* 8.7 9.2   < > 9.1   < > 8.8   < > 8.6 8.6   MAG 1.6  --   --   --  1.7  --  1.6  --  1.9 1.8   PHOS 5.0*  --   --   --   --   --   --   --  4.5 3.6    < > = values in this interval not displayed.       CBC -   Recent Labs   Lab Test 03/01/21  0637 02/27/21  0853 02/25/21  0542   WBC 5.6 4.2 " 2.9*   HGB 9.5* 9.8* 9.9*    268 293       LFTs -   Recent Labs   Lab Test 03/01/21  0637 02/27/21  0853 02/25/21  0542   ALKPHOS 339* 326* 345*   BILITOTAL 0.7 0.4 0.4   ALT 49 44 41   AST 17 16 15   PROTTOTAL 6.8 6.6* 6.6*   ALBUMIN 2.8* 2.5* 2.6*       Iron Panel -   Recent Labs   Lab Test 02/03/21  0415 12/13/18  1033 08/01/18  0921   IRON 51 16* 93   IRONSAT 36 7* 37   YOLA  --  302* 571*           Current Medications:    acetylcysteine  2 mL Inhalation BID     albuterol  2.5 mg Nebulization 4x Daily     B and C vitamin Complex with folic acid  5 mL Oral Daily     budesonide  0.5 mg Nebulization BID     fiber modular (NUTRISOURCE FIBER)  1 packet Per Feeding Tube BID     insulin aspart  1-10 Units Subcutaneous TID AC     insulin aspart  1-7 Units Subcutaneous At Bedtime     metoprolol tartrate  25 mg Oral or Feeding Tube BID     OLANZapine  10 mg Oral or Feeding Tube At Bedtime     pantoprazole  40 mg Oral QAM AC     posaconazole  300 mg Oral TID     predniSONE  2.5 mg Oral QPM     predniSONE  5 mg Oral Daily     sodium bicarbonate  1,300 mg Oral TID     sulfamethoxazole-trimethoprim  10 mL Oral or Feeding Tube Once per day on Mon Wed Fri     tacrolimus  1.5 mg Oral QPM     tacrolimus  2 mg Oral QAM       dextrose       - MEDICATION INSTRUCTIONS -

## 2021-03-01 NOTE — PHARMACY-RX INSURANCE COVERAGE
Consulted by Vicki Stovall with Diabetes Management Team to run a test claim for Blood Glucose Meters/Supplies & Insulins.    Patient has pharmacy benefits through [facility]. Per insurance, the following is preferred and covered by plan:      Contour meter/supplies    Lantus    Levemir    Tresiba    Toujeo    Novolog    Fiasp    Novolin N      Please feel free to contact me with any other test claims, prior authorizations, or insurance questions regarding outpatient medications.     Thanks!      Adriana Huertas [facility] Discharge Pharm[facility] Liaison  Pronouns: She/Her/Hers    [facility]  [facility]  [address] [place] [address] [address] [id number]   [email or web address]  [email or web address]   Phone: [phone]  Pager: [phone]  Fax: [phone]

## 2021-03-01 NOTE — PROGRESS NOTES
3-Day Calorie Count Assessment:  2/26: 1043 kcal and 36 g protein (2 meals, 2 supplements - breakfast ordered but not recorded)  2/27: 1016 kcal and 43 g protein (2 meals - breakfast not ordered, supplement intake not recorded)  2/28: 1041 kcal and 37 g protein (3 meals, 0 supplements)  3 day average PO intake = 1033 kcal (68% minimum energy needs) and 38 g protein (64% minimum protein needs)    MAR reviewed/labs reviewed/weights reviewed    Per pt visit: RD reviewed intakes above with pt, discussed lab trends, pt agreeing to having TF held and increase oral intakes, reviewed supplement orders and pt would like supplement adjusted to Breeze BID. RD gave encouragement that if pt can further increase po intakes, TF can be discontinued and pt would only go home on patency water flushes, pt verbalizes understanding and recommendations and agreeing with plan of care.    RD reviewed above with Rehab PA, reviewed/noted Lantus orders and PA will address. PA agreeing with RD recommendations as well.     Plan/Recommendations:  RD will place TF orders on hold, adjust water flushes to patency flushes of 30 ml BID at 0800 and 2000, discontinue Prosource protein flushes, continue Nutrisource fiber flushes BID as ordered at present time. RD will re-order calorie count x3 more days, adjust supplement order to Berry Breeze BID at 10 AM and 2 PM snack times per pt preference. If pt can continue to improve po intakes to meet ~75% of minium estimated needs, can recommend discontinuing TF. Feeding tube will likely need to be removed as outpatient, was placed on 2/16/21. RD will continue to monitor per policy.     Angeli Fuller RD, CNSC, LD  ARU RD pager: [phone]      Addendum 3/1 1240: IM Provider updated RD that Provider would like pt to receive 1 more night of TF due to Lantus administration, RD re-entered TF orders and updated nursing staff.     Angeli Fuller RD, SABINO, LD

## 2021-03-02 ENCOUNTER — APPOINTMENT (OUTPATIENT)
Dept: INTERVENTIONAL RADIOLOGY/VASCULAR | Facility: CLINIC | Age: 61
End: 2021-03-02
Attending: NURSE PRACTITIONER
Payer: COMMERCIAL

## 2021-03-02 LAB
ANION GAP SERPL CALCULATED.3IONS-SCNC: 14 MMOL/L (ref 3–14)
BUN SERPL-MCNC: 108 MG/DL (ref 7–30)
CALCIUM SERPL-MCNC: 8.4 MG/DL (ref 8.5–10.1)
CHLORIDE SERPL-SCNC: 91 MMOL/L (ref 94–109)
CO2 SERPL-SCNC: 23 MMOL/L (ref 20–32)
CREAT SERPL-MCNC: 4.25 MG/DL (ref 0.52–1.04)
GFR SERPL CREATININE-BSD FRML MDRD: 11 ML/MIN/{1.73_M2}
GLUCOSE BLDC GLUCOMTR-MCNC: 107 MG/DL (ref 70–99)
GLUCOSE BLDC GLUCOMTR-MCNC: 114 MG/DL (ref 70–99)
GLUCOSE BLDC GLUCOMTR-MCNC: 121 MG/DL (ref 70–99)
GLUCOSE BLDC GLUCOMTR-MCNC: 142 MG/DL (ref 70–99)
GLUCOSE BLDC GLUCOMTR-MCNC: 143 MG/DL (ref 70–99)
GLUCOSE BLDC GLUCOMTR-MCNC: 157 MG/DL (ref 70–99)
GLUCOSE SERPL-MCNC: 125 MG/DL (ref 70–99)
LACTATE BLD-SCNC: 0.4 MMOL/L (ref 0.7–2)
POTASSIUM SERPL-SCNC: 4.2 MMOL/L (ref 3.4–5.3)
SODIUM SERPL-SCNC: 128 MMOL/L (ref 133–144)
TACROLIMUS BLD-MCNC: 13.2 UG/L (ref 5–15)
TME LAST DOSE: NORMAL H

## 2021-03-02 PROCEDURE — 92507 TX SP LANG VOICE COMM INDIV: CPT | Mod: GN

## 2021-03-02 PROCEDURE — 94640 AIRWAY INHALATION TREATMENT: CPT

## 2021-03-02 PROCEDURE — 250N000012 HC RX MED GY IP 250 OP 636 PS 637: Performed by: PHYSICIAN ASSISTANT

## 2021-03-02 PROCEDURE — 87340 HEPATITIS B SURFACE AG IA: CPT | Performed by: CLINICAL NURSE SPECIALIST

## 2021-03-02 PROCEDURE — 999N000157 HC STATISTIC RCP TIME EA 10 MIN

## 2021-03-02 PROCEDURE — 3E04317 INTRODUCTION OF OTHER THROMBOLYTIC INTO CENTRAL VEIN, PERCUTANEOUS APPROACH: ICD-10-PCS | Performed by: RADIOLOGY

## 2021-03-02 PROCEDURE — C1757 CATH, THROMBECTOMY/EMBOLECT: HCPCS

## 2021-03-02 PROCEDURE — C1887 CATHETER, GUIDING: HCPCS

## 2021-03-02 PROCEDURE — 99152 MOD SED SAME PHYS/QHP 5/>YRS: CPT

## 2021-03-02 PROCEDURE — 94668 MNPJ CHEST WALL SBSQ: CPT

## 2021-03-02 PROCEDURE — 76937 US GUIDE VASCULAR ACCESS: CPT

## 2021-03-02 PROCEDURE — 250N000009 HC RX 250: Performed by: RADIOLOGY

## 2021-03-02 PROCEDURE — 97110 THERAPEUTIC EXERCISES: CPT | Mod: GP

## 2021-03-02 PROCEDURE — 80197 ASSAY OF TACROLIMUS: CPT | Performed by: PHYSICIAN ASSISTANT

## 2021-03-02 PROCEDURE — 99153 MOD SED SAME PHYS/QHP EA: CPT

## 2021-03-02 PROCEDURE — 86706 HEP B SURFACE ANTIBODY: CPT | Performed by: CLINICAL NURSE SPECIALIST

## 2021-03-02 PROCEDURE — 36905 THRMBC/NFS DIALYSIS CIRCUIT: CPT

## 2021-03-02 PROCEDURE — 999N000142 HC STATISTIC PROCEDURE PREP ONLY

## 2021-03-02 PROCEDURE — 90937 HEMODIALYSIS REPEATED EVAL: CPT

## 2021-03-02 PROCEDURE — 250N000011 HC RX IP 250 OP 636: Performed by: RADIOLOGY

## 2021-03-02 PROCEDURE — 250N000013 HC RX MED GY IP 250 OP 250 PS 637: Performed by: PHYSICIAN ASSISTANT

## 2021-03-02 PROCEDURE — 80187 DRUG ASSAY POSACONAZOLE: CPT | Performed by: PHYSICAL MEDICINE & REHABILITATION

## 2021-03-02 PROCEDURE — 258N000003 HC RX IP 258 OP 636: Performed by: RADIOLOGY

## 2021-03-02 PROCEDURE — 272N000504 HC NEEDLE CR4

## 2021-03-02 PROCEDURE — 76937 US GUIDE VASCULAR ACCESS: CPT | Mod: 26 | Performed by: RADIOLOGY

## 2021-03-02 PROCEDURE — 999N001017 HC STATISTIC GLUCOSE BY METER IP

## 2021-03-02 PROCEDURE — 36905 THRMBC/NFS DIALYSIS CIRCUIT: CPT | Mod: GC | Performed by: RADIOLOGY

## 2021-03-02 PROCEDURE — C1769 GUIDE WIRE: HCPCS

## 2021-03-02 PROCEDURE — 99152 MOD SED SAME PHYS/QHP 5/>YRS: CPT | Mod: GC | Performed by: RADIOLOGY

## 2021-03-02 PROCEDURE — 99233 SBSQ HOSP IP/OBS HIGH 50: CPT | Performed by: PHYSICIAN ASSISTANT

## 2021-03-02 PROCEDURE — 36415 COLL VENOUS BLD VENIPUNCTURE: CPT | Performed by: PHYSICAL MEDICINE & REHABILITATION

## 2021-03-02 PROCEDURE — 80048 BASIC METABOLIC PNL TOTAL CA: CPT | Performed by: PHYSICAL MEDICINE & REHABILITATION

## 2021-03-02 PROCEDURE — 97530 THERAPEUTIC ACTIVITIES: CPT | Mod: GO | Performed by: OCCUPATIONAL THERAPIST

## 2021-03-02 PROCEDURE — 128N000003 HC R&B REHAB

## 2021-03-02 PROCEDURE — 258N000003 HC RX IP 258 OP 636: Performed by: CLINICAL NURSE SPECIALIST

## 2021-03-02 PROCEDURE — 272N000564 HC SHEATH CR2

## 2021-03-02 PROCEDURE — 83605 ASSAY OF LACTIC ACID: CPT | Performed by: CLINICAL NURSE SPECIALIST

## 2021-03-02 PROCEDURE — 05CF3ZZ EXTIRPATION OF MATTER FROM LEFT CEPHALIC VEIN, PERCUTANEOUS APPROACH: ICD-10-PCS | Performed by: RADIOLOGY

## 2021-03-02 PROCEDURE — C1725 CATH, TRANSLUMIN NON-LASER: HCPCS

## 2021-03-02 PROCEDURE — 97535 SELF CARE MNGMENT TRAINING: CPT | Mod: GO | Performed by: OCCUPATIONAL THERAPIST

## 2021-03-02 PROCEDURE — 94640 AIRWAY INHALATION TREATMENT: CPT | Mod: 76

## 2021-03-02 PROCEDURE — 255N000002 HC RX 255 OP 636: Performed by: RADIOLOGY

## 2021-03-02 PROCEDURE — 272N000302 HC DEVICE INFLATION CR5

## 2021-03-02 PROCEDURE — 272N000211 HC BALLOON (NON-PTA) CR8

## 2021-03-02 PROCEDURE — 250N000009 HC RX 250: Performed by: PHYSICIAN ASSISTANT

## 2021-03-02 PROCEDURE — 250N000011 HC RX IP 250 OP 636: Performed by: CLINICAL NURSE SPECIALIST

## 2021-03-02 RX ORDER — NALOXONE HYDROCHLORIDE 0.4 MG/ML
0.2 INJECTION, SOLUTION INTRAMUSCULAR; INTRAVENOUS; SUBCUTANEOUS
Status: DISCONTINUED | OUTPATIENT
Start: 2021-03-02 | End: 2021-03-05 | Stop reason: HOSPADM

## 2021-03-02 RX ORDER — NALOXONE HYDROCHLORIDE 0.4 MG/ML
0.4 INJECTION, SOLUTION INTRAMUSCULAR; INTRAVENOUS; SUBCUTANEOUS
Status: DISCONTINUED | OUTPATIENT
Start: 2021-03-02 | End: 2021-03-05 | Stop reason: HOSPADM

## 2021-03-02 RX ORDER — HEPARIN SODIUM 200 [USP'U]/100ML
1 INJECTION, SOLUTION INTRAVENOUS CONTINUOUS PRN
Status: DISCONTINUED | OUTPATIENT
Start: 2021-03-02 | End: 2021-03-05 | Stop reason: HOSPADM

## 2021-03-02 RX ORDER — HEPARIN SODIUM 1000 [USP'U]/ML
3000 INJECTION, SOLUTION INTRAVENOUS; SUBCUTANEOUS
Status: DISCONTINUED | OUTPATIENT
Start: 2021-03-02 | End: 2021-03-05 | Stop reason: HOSPADM

## 2021-03-02 RX ORDER — FLUMAZENIL 0.1 MG/ML
0.2 INJECTION, SOLUTION INTRAVENOUS
Status: DISCONTINUED | OUTPATIENT
Start: 2021-03-02 | End: 2021-03-05

## 2021-03-02 RX ORDER — HEPARIN SODIUM 1000 [USP'U]/ML
500 INJECTION, SOLUTION INTRAVENOUS; SUBCUTANEOUS
Status: COMPLETED | OUTPATIENT
Start: 2021-03-02 | End: 2021-03-02

## 2021-03-02 RX ORDER — SODIUM CHLORIDE 9 MG/ML
INJECTION, SOLUTION INTRAVENOUS CONTINUOUS
Status: DISCONTINUED | OUTPATIENT
Start: 2021-03-02 | End: 2021-03-05 | Stop reason: HOSPADM

## 2021-03-02 RX ORDER — LIDOCAINE 40 MG/G
CREAM TOPICAL
Status: DISCONTINUED | OUTPATIENT
Start: 2021-03-02 | End: 2021-03-05 | Stop reason: HOSPADM

## 2021-03-02 RX ORDER — HEPARIN SODIUM 1000 [USP'U]/ML
500 INJECTION, SOLUTION INTRAVENOUS; SUBCUTANEOUS CONTINUOUS
Status: DISCONTINUED | OUTPATIENT
Start: 2021-03-02 | End: 2021-03-02

## 2021-03-02 RX ORDER — CEFAZOLIN SODIUM 500 MG/2.2ML
500 INJECTION, POWDER, FOR SOLUTION INTRAMUSCULAR; INTRAVENOUS
Status: COMPLETED | OUTPATIENT
Start: 2021-03-02 | End: 2021-03-02

## 2021-03-02 RX ORDER — IODIXANOL 320 MG/ML
100 INJECTION, SOLUTION INTRAVASCULAR ONCE
Status: COMPLETED | OUTPATIENT
Start: 2021-03-02 | End: 2021-03-02

## 2021-03-02 RX ORDER — FENTANYL CITRATE 50 UG/ML
25-50 INJECTION, SOLUTION INTRAMUSCULAR; INTRAVENOUS EVERY 5 MIN PRN
Status: DISCONTINUED | OUTPATIENT
Start: 2021-03-02 | End: 2021-03-05

## 2021-03-02 RX ADMIN — IODIXANOL 100 ML: 320 INJECTION, SOLUTION INTRAVASCULAR at 14:00

## 2021-03-02 RX ADMIN — POSACONAZOLE 300 MG: 100 TABLET, COATED ORAL at 09:16

## 2021-03-02 RX ADMIN — MIDAZOLAM 1 MG: 1 INJECTION INTRAMUSCULAR; INTRAVENOUS at 15:07

## 2021-03-02 RX ADMIN — ALTEPLASE 10 MG/HR: 2.2 INJECTION, POWDER, LYOPHILIZED, FOR SOLUTION INTRAVENOUS at 14:55

## 2021-03-02 RX ADMIN — PREDNISONE 2.5 MG: 2.5 TABLET ORAL at 22:01

## 2021-03-02 RX ADMIN — METOPROLOL TARTRATE 25 MG: 25 TABLET, FILM COATED ORAL at 09:32

## 2021-03-02 RX ADMIN — POSACONAZOLE 300 MG: 100 TABLET, COATED ORAL at 22:05

## 2021-03-02 RX ADMIN — MIDAZOLAM 0.5 MG: 1 INJECTION INTRAMUSCULAR; INTRAVENOUS at 14:29

## 2021-03-02 RX ADMIN — TACROLIMUS 1.5 MG: 5 CAPSULE ORAL at 09:06

## 2021-03-02 RX ADMIN — Medication 1 PACKET: at 22:57

## 2021-03-02 RX ADMIN — ACETYLCYSTEINE 2 ML: 100 INHALANT RESPIRATORY (INHALATION) at 21:01

## 2021-03-02 RX ADMIN — SODIUM CHLORIDE 250 ML: 9 INJECTION, SOLUTION INTRAVENOUS at 16:28

## 2021-03-02 RX ADMIN — FENTANYL CITRATE 25 MCG: 50 INJECTION, SOLUTION INTRAMUSCULAR; INTRAVENOUS at 14:29

## 2021-03-02 RX ADMIN — SODIUM CHLORIDE 300 ML: 9 INJECTION, SOLUTION INTRAVENOUS at 16:15

## 2021-03-02 RX ADMIN — HEPARIN SODIUM 500 UNITS/HR: 1000 INJECTION INTRAVENOUS; SUBCUTANEOUS at 16:15

## 2021-03-02 RX ADMIN — HEPARIN SODIUM 500 UNITS: 1000 INJECTION INTRAVENOUS; SUBCUTANEOUS at 16:12

## 2021-03-02 RX ADMIN — HEPARIN SODIUM 1 BAG: 200 INJECTION, SOLUTION INTRAVENOUS at 14:14

## 2021-03-02 RX ADMIN — CEFAZOLIN 500 MG: 225 INJECTION, POWDER, FOR SOLUTION INTRAMUSCULAR; INTRAVENOUS at 13:40

## 2021-03-02 RX ADMIN — MIDAZOLAM 0.5 MG: 1 INJECTION INTRAMUSCULAR; INTRAVENOUS at 14:36

## 2021-03-02 RX ADMIN — BUDESONIDE 0.5 MG: 0.5 INHALANT RESPIRATORY (INHALATION) at 21:01

## 2021-03-02 RX ADMIN — PREDNISONE 5 MG: 5 TABLET ORAL at 09:06

## 2021-03-02 RX ADMIN — FENTANYL CITRATE 25 MCG: 50 INJECTION, SOLUTION INTRAMUSCULAR; INTRAVENOUS at 14:37

## 2021-03-02 RX ADMIN — PANTOPRAZOLE SODIUM 40 MG: 40 TABLET, DELAYED RELEASE ORAL at 09:15

## 2021-03-02 RX ADMIN — ALBUTEROL SULFATE 2.5 MG: 2.5 SOLUTION RESPIRATORY (INHALATION) at 21:01

## 2021-03-02 RX ADMIN — OLANZAPINE 10 MG: 5 TABLET, FILM COATED ORAL at 22:01

## 2021-03-02 RX ADMIN — FENTANYL CITRATE 50 MCG: 50 INJECTION, SOLUTION INTRAMUSCULAR; INTRAVENOUS at 15:08

## 2021-03-02 RX ADMIN — Medication 5 ML: at 09:15

## 2021-03-02 RX ADMIN — METOPROLOL TARTRATE 25 MG: 25 TABLET, FILM COATED ORAL at 22:04

## 2021-03-02 RX ADMIN — LIDOCAINE HYDROCHLORIDE 3 ML: 10 INJECTION, SOLUTION EPIDURAL; INFILTRATION; INTRACAUDAL; PERINEURAL at 15:10

## 2021-03-02 RX ADMIN — SODIUM CHLORIDE: 9 INJECTION, SOLUTION INTRAVENOUS at 13:39

## 2021-03-02 RX ADMIN — HEPARIN SODIUM 3000 UNITS: 1000 INJECTION INTRAVENOUS; SUBCUTANEOUS at 15:02

## 2021-03-02 ASSESSMENT — MIFFLIN-ST. JEOR
SCORE: 1084.37
SCORE: 1054.13
SCORE: 1084.37

## 2021-03-02 NOTE — PROGRESS NOTES
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 She was admitted to [facility] 1/4-2/25/21 for acute hypoxic respiratory failure found to have PJP pneumonia w/ stay c/b ARDS requiring tracheostomy, PEG/J, and dilation of right main bronchus as well as a fib w/ RVR. Transferred to ARU for ongoing rehabilitation. IM following d/t transplant hx.      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 On 2/19/21 had right mainstem bronchus dilation. Recent supratherapeutic Tacrolimus levels, last 13.1 on 3/2. Goal 8-10.  - Hold Tacrolimus tonight per Pulmonology. Resume 1.5 mg BID on 3/3. Monitor daily levels.  - Prednisone 5 mg AM/2.5 mg PM (no further taper planned at this time).   - Continue prophylaxis w/ Bactrim Q MWF.   - CMP, CBC, Mg, Phos Q M/Th and CXR Q Monday.   - Pulmonology to follow  - Repeat bronch in ~6 weeks (first week of April - TBD).      #Acute Hypoxemic Respiratory Failure s/p Tracheostomy  #PJP Pneumonia  Presented in respiratory failure and ultimately failed extubation trials x2 and developed ARDS. Required proning, inhaled epoprostenol, pressors. Tracheostomy done 2/12 and exchanged for #6 cuffless Shiley on 2/22. Completed steroid taper 2/17 and 21 d course of Bactrim 2/15. O2 sats stable on 1L NC.  - Wean supplemental O2 as able. This should be humidified when in use. TD can be used PRN for additional humidity if needed for secretions.   - Trach to be  covered w/ PMSV during the day and HME overnight. OK for capping trials per Pulm. Suspect she may be able to de-cannulate soon.   - Continue QID Albuterol, BID Mucomyst, BID Pulmicort nebs.      #History of Left Sided Aspergillus Empyema - Diagnosed 10/2019 s/p amphotericin beads and is on indefinite posaconazole. Last level 2/25 still very low but transitioned from suspension/feeding tube to PO dosing that should be taken w/ food.   - Continue Posaconazole 300 mg PO TID.   - Posaconazole level pending from 3/2.   - OP ID f/u.     #ESRD - Currently on HD Tu/Th/Sat (was biweekly prior to hospitalization) via AVF w/ partial graft that can be challenging to access. Had CRRT 2/5-2/8 d/t hemodynamic instability, otherwise has tolerated iHD (temp line removed 2/9). Hyperkalemic 2/27 and 3/1 in setting of ERSD, supratherapeutic Tacro level, Bactrim, and fistula clotting. K wnl today.  - IR Fistulogram planned for today with HD following.  - Nephrology following  - Has required occasional midodrine during HD.      #History of CMV and EBV Viremias - Last CMV PCR 2/25 undetected. EBV level 2/22/21 increased to 75,709 (log of 4.9) from 5K (log 3.8) on 1/25/21. This increase is likely secondary to acute illness and recent steroid burst.   - CMV PCR q 2 weeks (next 3/11) since recently stopping VGCV prophylactic course   - Next EBV PCR due 3/22.     #PAF - In setting of lung disease and critical illness. Short self limited runs in the hospital. Echo 1/25/21 w/ normal LVEF and RV size/function, high RA pressure. CHADSVasc 1 with no need for long term oral anticoagulation (per 2/22 EP note). Despite this, was on Eliquis upon arrival to ARU which was discontinued after discussion with patient and pulmonology. Her only DVT was line associated after transplant in 2018, not on AC prior to hospitalization. NSR on exam.   - Continue Metoprolol 25 mg BID (reduced from 50 mg BID d/t borderline BPs).   - Needs OP Ziopatch arranged at ARU  "discharge.      #Steroid and TF Induced Hyperglycemia - A1c 6% on 1/24/21. On cycled TFs overnight and now taking PO. Steroid taper completed 2/17. BG controlled on Lantus 10 units and \"high\" Novolog correction scale. Lantus discontinued 3/1 with stopping of TF.  - High Novolog sliding scale AC/HS.   - If hyperglycemic off Lantus/TF, consider starting NPH 6 units with AM dose of Prednisone    #Hyponatremia - Likely due to ESRD. Sodium 128 this AM.   - Anticipate improvement after HD today    #Non-Severe Malnutrition - In setting of chronic illness. TF initiated during hospitalization d/t intubation. PEG-J placed 2/16 by IR. Now meeting caloric goals with PO intake alone.  - Discontinue TF per Nutrition  - 2 g K diet + PO supplements  - PEG-J to remain in place for 3 months (remove ~5/16)    #Anxiety - On Zyprexa 10 mg q HS. Wean as able.    MERRICK Tucker  Hospitalist Service  [facility]  Contact information available via [facility] Paging/Directory  ____________________________________________________________________    Interval History  Doing well today. Breathing comfortably. O2 weaned to 1L. Tolerating trach capping. Occasional cough. Stools becoming more formed. Produces some urine on HD, no dysuria. Denies chest pain, SOB, n/v or abdominal pain.    Data reviewed today: I reviewed all medications, new labs and imaging results over the last 24 hours.    Physical Exam  Vital Signs: Temp: 98.2  F (36.8  C) Temp src: Oral BP: 130/84 Pulse: 103   Resp: 18 SpO2: 100 % O2 Device: Nasal cannula Oxygen Delivery: 1 LPM  Weight: 118 lbs 0 oz  General: Awake. Sitting up in chair. NAD.  at bedside.  HEENT: Anicteric sclera. MMM. Trach cap in place.  CV: RRR  Respiratory: Normal effort on 1L via NC. Few scattered crackles. No wheezes or rhonchi.  GI: Abdomen is soft, non-tender, and non-distended. Bowel sounds present. GJ tube in place.  Extremities: No peripheral edema. Warm " and well perfused.  Skin: No rashes or jaundice on exposed areas.

## 2021-03-02 NOTE — PROGRESS NOTES
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 Nephrology consutled for dialysis needs.     Interval History :   Mrs Blue had issues with fistula/graft yesterday, planning declot this afternoon then HD run.  Likely we will get better clearance once intervention is done as I suspect she was having some recirculation prior.  Ordered for 2-3L, getting closer to discharge closer to home.       Assessment & Recommendations:   ESRD-Runs at [facility] through [facility].  Has atypical HD schedule of Monday and Thursday, has AVF with partial graft which has been challenging to access per RN's.  Needed CRRT 2/5-2/8 due to hemodynamic instability but otherwise has run iHD.  Currently on TTS schedule.                    -Had planned HD today, unable with clotted access.                -Has L arm AVF/graft, somewhat challenging access.  Having issues with clotting yesterda, planning IR intervention today then HD run.                 -Removed temp HD line on 2/9.        Volume status-Has minimal edema, had planned for 2-3L of UF as usual before issues with access.       Electrolytes/pH-K 4.2, bicarb 23, did get kayexalate last evening which was effective in clearing K.  Na down and BUN up, running today.      Ca/phos/pth-Ca 8.4, Mg and Phos mildly up last check.      Anemia-Hgb 9.5, last PRBC's 2/15.  Checked iron stores and sats 36% on 2/3, started EPO 4k with runs.       Nutrition-Nutren TF.       Seen and discussed with Dr oHneycutt     Recommendations were communicated to primary team via verbal communication.           ADRIÁN Lo  "CNS  Clinical Nurse Specialist  [phone]      Review of Systems:   I reviewed the following systems:  Gen: No fevers or chills  CV: No CP at rest  Resp: No SOB at rest  GI: No N/V    Physical Exam:   I/O last 3 completed shifts:  In: 120 [NG/GT:120]  Out: -    /72 (BP Location: Right arm)   Pulse 104   Temp 97.8  F (36.6  C) (Oral)   Resp 18   Ht 1.6 m (5' 3\")   Wt 53.5 kg (118 lb)   LMP 06/07/2014 (Exact Date)   SpO2 99%   BMI 20.90 kg/m       GENERAL APPEARANCE: Trached but has speaking valve in place.    EYES: no scleral icterus  Endo: no moon facies  Pulmonary: Intubated, proned, equal expansion.    CV: regular rhythm, normal rate  GI: soft, non distended  MS: no evidence of inflammation in joints, no muscle tenderness  :  No Basilio  SKIN: warm, dry, trace edema.    NEURO: No focal deficits.     Labs:   All labs reviewed by me  Electrolytes/Renal -   Recent Labs   Lab Test 03/02/21  0750 03/01/21  1559 03/01/21  0637 02/24/21  0539 02/24/21  0539 02/22/21  0527 02/22/21  0527 02/15/21  0418 02/15/21  0418 02/14/21  0336   * 126* 128*   < > 132*   < > 131*   < > 136 136   POTASSIUM 4.2 5.5* 5.5*   < > 4.3   < > 4.7   < > 3.9 3.8   CHLORIDE 91* 92* 92*   < > 98   < > 99   < > 100 100   CO2 23 23 26   < > 28   < > 27   < > 27 29   * 93* 82*   < > 35*   < > 38*   < > 52* 32*   CR 4.25* 3.69* 3.45*   < > 2.18*   < > 2.53*   < > 2.75* 1.84*   * 117* 188*   < > 140*   < > 136*   < > 155* 149*   CHELSEY 8.4* 8.8 8.4*   < > 9.1   < > 8.8   < > 8.6 8.6   MAG  --   --  1.6  --  1.7  --  1.6  --  1.9 1.8   PHOS  --   --  5.0*  --   --   --   --   --  4.5 3.6    < > = values in this interval not displayed.       CBC -   Recent Labs   Lab Test 03/01/21 0637 02/27/21  0853 02/25/21  0542   WBC 5.6 4.2 2.9*   HGB 9.5* 9.8* 9.9*    268 293       LFTs -   Recent Labs   Lab Test 03/01/21 0637 02/27/21  0853 02/25/21  0542   ALKPHOS 339* 326* 345*   BILITOTAL 0.7 0.4 0.4   ALT 49 44 41 "   AST 17 16 15   PROTTOTAL 6.8 6.6* 6.6*   ALBUMIN 2.8* 2.5* 2.6*       Iron Panel -   Recent Labs   Lab Test 02/03/21  0415 12/13/18  1033 08/01/18  0921   IRON 51 16* 93   IRONSAT 36 7* 37   YOLA  --  302* 571*           Current Medications:    sodium chloride 0.9%  250 mL Intravenous Once in dialysis     sodium chloride 0.9%  300 mL Hemodialysis Machine Once     acetylcysteine  2 mL Inhalation BID     albuterol  2.5 mg Nebulization 4x Daily     B and C vitamin Complex with folic acid  5 mL Oral Daily     budesonide  0.5 mg Nebulization BID     ceFAZolin  500 mg Intravenous Pre-Op/Pre-procedure x 1 dose     fiber modular (NUTRISOURCE FIBER)  1 packet Per Feeding Tube BID     heparin (porcine)  500 Units Hemodialysis Machine Once in dialysis     insulin aspart  1-10 Units Subcutaneous TID AC     insulin aspart  1-7 Units Subcutaneous At Bedtime     iodixanol  100 mL Intravenous Once     metoprolol tartrate  25 mg Oral or Feeding Tube BID     OLANZapine  10 mg Oral or Feeding Tube At Bedtime     pantoprazole  40 mg Oral QAM AC     posaconazole  300 mg Oral TID     predniSONE  2.5 mg Oral QPM     predniSONE  5 mg Oral Daily     sodium chloride (PF)  9 mL Intracatheter During Hemodialysis (from stock)     sodium chloride (PF)  9 mL Intracatheter During Hemodialysis (from stock)     sulfamethoxazole-trimethoprim  10 mL Oral or Feeding Tube Once per day on Mon Wed Fri     tacrolimus  1.5 mg Oral BID IS       dextrose       heparin       heparin (porcine)       - MEDICATION INSTRUCTIONS -       sodium chloride 10 mL/hr at 03/02/21 1339     - MEDICATION INSTRUCTIONS -

## 2021-03-02 NOTE — PLAN OF CARE
Pt left in a stretcher to dialysis. She will have dialysis port opened prior to dialysis session. The procedure to open the port was scheduled at [facility]. She has been NPO since mid night but took medication with a sip of water. Her blood sugar was 107 before she left the unit at 1115

## 2021-03-02 NOTE — PROGRESS NOTES
Individualized Overall Plan Of Care (IOPOC)      Rehab diagnosis/Impairment Group Code: Pulmonary 10.9 other pulmonary: ards and septic shock in setting of prior b lung transplant  Respiratory failure (h)       Expected functional outcome: to reach a level of mod I with ADLs    Clinical Impression Comments: debilitated post prolonged medical course.    Mobility: Pt should benefit well from intensive skilled PT during ARU to address strength, balance, and CV endurance impairments s/p pneumonia with ARDS s/p intubation. Expect patient to be able to discharge to home with family, mod I, with transition to OP PT.    ADL: Pt would benefit from skilled OT to address ADL/IADL performance deficits and address goals in POC to maximize IND and safety in discharge environment.     Communication/Cognition/Swallow: Completed  communication eval for PMSV tolerance per MD orders. Pt currently has a #6 Shiley trach- cuffless. Pt was already assessed for PMSV ( speacking valve use)- whle in hospital and has advanced to independent use of PMSV while still in hospital. pt maitains O2 sats at 96% while wearing PMSV. At the time of this eval- pt had already been wearing the PMSV for 3-4 hours without difficulty. Pt reports that she has been wearing it daily - all day until she goes to sleep. Pt is able to demonstate indepednece with donning and doffing PMSV and is able to relay the care for it for clearing. Further pt is able to state safety guidelines for wearing PMSV: removal while sleeping. removal if she becomes SOB or if there is increased WOB.or if O2 sats drop below 90%. Further pt demosntrates adequate voicing- stonge, loud and intelligible. pt also knows to wear PMSV for all po intake. At this point- SLP will plan to just check in 2x per week to assure that PMSV tolerance is maintained- Pt shoul dcontinue to wear throguht the day as tolerated  and as noted above- remove when sleeping or if she becomes fatigued, SOB or if O2  sats drop below 90%- pt will wear nasal cannula during day and humidification sourse in evening. At this point pt would be likely ready to trial trach capping - but MD will consult with pulmonology team for further next step direction with this.     Intensity of therapy:   PT 60 minutes, Daily, for 7 days  OT 60 minutes, Daily, for 7-10  SLP 60 minutes, 2 times/wk, for 1 week    Orthotics none   Education tube feeding  Neuropsychology Testing: No        Medical Prognosis: good      Physician summary statement: despite prolonged medical course and debilitation, patient doing quite well with therapy program.     Discharge destination: prior home  Discharge rehabilitation needs: home care, RN, PT and OT      Estimated length of stay: 12 days       Rehabilitation Physician Shree Ford DO

## 2021-03-02 NOTE — PROCEDURES
[facility]    Procedure: IR Procedure Note    Date/Time: 3/2/2021 3:42 PM  Performed by: Emanuel Lee MD  Authorized by: Emanuel Lee MD     UNIVERSAL PROTOCOL   Site Marked: NA  Prior Images Obtained and Reviewed:  Yes  Required items: Required blood products, implants, devices and special equipment available    Patient identity confirmed:  Verbally with patient, arm band, provided demographic data and hospital-assigned identification number  Patient was reevaluated immediately before administering moderate or deep sedation or anesthesia  Confirmation Checklist:  Patient's identity using two indicators, relevant allergies, procedure was appropriate and matched the consent or emergent situation and correct equipment/implants were available  Time out: Immediately prior to the procedure a time out was called    Universal Protocol: the Joint Commission Universal Protocol was followed    Preparation: Patient was prepped and draped in usual sterile fashion           ANESTHESIA    Anesthesia: Local infiltration  Local Anesthetic:  Lidocaine 1% without epinephrine      SEDATION    Patient Sedated: Yes    Sedation Type:  Moderate (conscious) sedation  Sedation:  Fentanyl and midazolam  Vital signs: Vital signs monitored during sedation    See dictated procedure note for full details.  Findings: Successful declot    Specimens: none    Complications: None    Condition: Stable    Plan: Fistula/graft may be used immediately    PROCEDURE   Patient Tolerance:  Patient tolerated the procedure well with no immediate complications    Length of time physician/provider present for 1:1 monitoring during sedation: 60

## 2021-03-02 NOTE — PROGRESS NOTES
Patient Name: Kecia Blue  Medical Record Number: [id number]  Today's Date: 3/2/2021    Procedure: Left upper extremity fistula graft declot  Proceduralist: Dr Jseus Peng    Procedure Start: 1426  Procedure end: 1515  Sedation medications administered: Midazolam 2 mg, Fentanyl 100 mcg       Report given to:  Dialysis RN  : N/A    Other Notes: Pt arived to IR room  5 from 2A. Consent reviewed. Pt denies any questions or concerns regarding procedure. Pt positioned supine and monitored per protocol. Left arm sheath removed and pressure held for 15 minutes by Dr Lee.  Tip stop placed at site.  Site  Clean, Dry, no hematoma noted.   Pt tolerated procedure without any noted complications. Pt transferred back to Dialysis.

## 2021-03-02 NOTE — PRE-PROCEDURE
GENERAL PRE-PROCEDURE:   Procedure:  Left arm fistula decolt, possible tunneled central venous catheter placement  Date/Time:  3/2/2021 12:39 PM    Verbal consent obtained?: Yes    Written consent obtained?: Yes    Risks and benefits: Risks, benefits and alternatives were discussed    Consent given by:  Patient  Patient states understanding of procedure being performed: Yes    Patient's understanding of procedure matches consent: Yes    Procedure consent matches procedure scheduled: Yes    Expected level of sedation:  Moderate  Appropriately NPO:  Yes  ASA Class:  Class 2- mild systemic disease, no acute problems, no functional limitations  Mallampati  :  Grade 2- soft palate, base of uvula, tonsillar pillars, and portion of posterior pharyngeal wall visible  Lungs:  Lungs clear with good breath sounds bilaterally  Heart:  Normal heart sounds and rate  History & Physical reviewed:  History and physical reviewed and no updates needed  Statement of review:  I have reviewed the lab findings, diagnostic data, medications, and the plan for sedation

## 2021-03-02 NOTE — PROGRESS NOTES
Targeting discharge date of Friday 03/05. Will confirm with IDT. LUANA called and spoke with Juana the RN at [facility] HD facility in [place], [place]. Juana will check the schedule for available chair time (since pt will now be on a regular 3x/week schedule) and will f/u with SWer when more information is known. LUANA will then fax updated clinicals. Pt prefers MWF schedule and any chair time. If pt discharge's on Friday and discharges on MWF schedule, pt will most likely need HD on Friday 03/05 early AM.     [facility]   [address]   [place], [place]  Phone: [phone], Fax: [phone]    ADDENDUM: Received call from Juana, pt chair is set for MWF. Pt has to arrive at 2:00pm on Monday and 1:30pm on Wednesday and Friday. LUANA will fax updated clinicals before discharge. Information added to pt AVS. Pt at HD now, will update pt when back on the unit. Plan to do OP therapy at discharge.     Juana Booker, VERENICE, [facility]   Phone: [phone]  I   Pager: [phone]

## 2021-03-02 NOTE — PROGRESS NOTES
1340  Prep is complete for procedure.  eKcia is a [facility] patient who is here for Fistulogram in Left Upper arm.  Pulse felt, but no thrill.  She will be going to dialysis after the Fistulogram is complete.  Trach is in place with O2 at 1L/NC.  Denies any pain.  CTRN

## 2021-03-02 NOTE — PLAN OF CARE
Patient is alert and oriented x4. Able to make needs known. VSS. Denied pain, denied sob, denied CP, denied nusea and vomiting this shift, denied fever or chills, denied dizziness. A of 1 with walker for transfers. Continent of bowel. LBM today. Pt on dialysis.     K=5.5 and pt was sent to dialysis. Per dialysis nurse report, Fistula is clotted and they could not run dialysis. Pt came back to ARU and received a one time ordered Kayexalate. Will have lab in AM.     Tomorrow, Pt is scheduled to go to surgery and have  fistula fix. Pt will have dialysis after. Appointment is at 11:30. Transportation  time set for 11AM.      Per pharmacy, pt was not supposed to receive anything through GJ tube for 3 hrs after receiving kayexalate.  TF did not start until 2030.     Discussed with hospitalies Dr. Carlisle tomorrow's surgery, TF, BG and insuline (AM shift, pt received 10 units of lantus. per nutrition, pt was going to have TF tonight and hold tomorrow)    Pt is also on Regular renal diet, thin liquids.  Pt ate 100 of her dinner. Had her boost and shake. BGs 117, 97 and 132.      Telephone order with read back from Dr. Carlisle. Pt will be NPO starting at midnight (3/2/21). TF will stop at midnight as well. Monitor BG and assess pt for hypoglycemia. Pt and  are aware and very engaged.     Trach site clean, dry intact.  Pt on 1L/NC sating >99%. Speaking valve during day. HME in place for night. GJ tube site cleaned and new dressing applied. Tacrolimus evening dose was held, will resume tomorrow per order.  Pt can turn  and reposition in bed independently. Frequent safety rounds done. Alarm on for safety. Call light with in reach. Continue with POC

## 2021-03-02 NOTE — PROGRESS NOTES
Calorie Count:  3/1: 1188 kcal and 47 g protein (2 meals, 1 supplements)    Chart reviewed, pt was made NPO at midnight and TF held at that time as pt will go to procedure (declot dialysis catheter) today. BGL's reviewed.    Plan/Recommendations:  RD will place TF orders on hold today, continue calorie count, resume diet/supplements once able post procedure per Provider.     Angeli Fuller RD, CNSC, LD  ARU RD pager: [phone]

## 2021-03-02 NOTE — PLAN OF CARE
Discharge Planner Post-Acute Rehab OT:      Discharge Plan: Home with spouse support, OP OT     Precautions: falls, tracheostomy, JPEG, O2 nasal canula     Current Status:  ADLs: Mod I TB dressing while seated EOB, Mod I gathering clothing from closet using 4ww, Min A STS from standard toilet seat, SBA during STS from higher surfaces, SBA oral cares while standing EOS with fww, set up g/h seated in chair, UB bathing with set up, LB bathing SBA, shower transfer SBA using FWW.   IADLs: Not formally assessed, family providing assistance with heavier IADL at baseline.   Vision/Cognition: Wears glasses, no concerns regarding vision or cognition at this time.      Assessment: Pt progressing towards ADL and IADL goals, limited by overall deconditioning and SOB after extended activity. Pt completed dressing without supplemental O2, experiencing SOB with and O2 of 88, but recovered quickly. Continues 1L O2 for activity. Pt continues to need physical assistance to stand from lower surfaces. Plan to advance to Mod I tomorrow with 4ww. On track to discharge on 3/5.     Other Barriers to Discharge (DME, Family Training, etc): Equipment: pt has a walker and shower chair at home. Pt spouse is designated visitor, very supportive, family training will be beneficial prior to discharge.     -55 min OT due to blood work and medical procedure

## 2021-03-02 NOTE — PLAN OF CARE
FOCUS/GOAL  Bowel management, Bladder management, Skin integrity and Cognition/Memory/Judgment/Problem solving    ASSESSMENT, INTERVENTIONS AND CONTINUING PLAN FOR GOAL:  Pt is alert and oriented, denied pain, sob, fever, chills, n/v, or new numbness and tingling overnight, trache is patent with HME on. Pt receiving 1 LPM via NC overnight, TF running until midnight at which time pt became NPO in preparation for surgery the following day, producing little urine but continent of bowel and bladder, Fistula in LUE clogged but intact, no further care concerns at this time continue with NPO until procedure is over.

## 2021-03-02 NOTE — PLAN OF CARE
Discharge Plan: home - but may not need HH sptx if ends up being decannulated prior to d/c     Precautions: tracheostomy and use of PMSV; fall     Current Status:  Communication: Pt  communicates with use of PMSV -Passy Musa speaking valve --has a # 6 shiley cuffless trach- pt is completely independent with donning and doffing PMSV and care of speaking valve; further pt is independent with wearing PMSV- wears throughout the day without difficulty and follows guidelines of removing PMSV when sleeping, or if increaes of SOB or WOB or if O2 sats dip below 90%--  however pt has not been experiencing any of this- is tolerating well. Further pt is wearing nasal cannula during the day and just humidity at night   Cognition: WFL- intact  Swallow: tolerating regular diet with thin liquids without difficulty -- had VFSS on 2/24- no aspiration and was advanced at that time to regular diet. Pt using safe swallow strategies independently- swallow goals met.      Assessment: Patient educated re: tracheotomy cap vs PMSV. Patient able to tolerate cap for full session and also able to demonstrate ability to don/doff with minimal cues. Plan to wear for the AM this date and remove when leaving for procedure. Patient may replace again this evening but remove before going to bed this evening. Will see SLP again tomorrow morning. Staff and family education re: cap placement. Patient's O2 levels at 97-98% on 1 liter via nasal canula.      Other Barriers to Discharge (Family Training, etc): will need training in trach cares if pt is not decannulated before d/c

## 2021-03-03 ENCOUNTER — APPOINTMENT (OUTPATIENT)
Dept: OCCUPATIONAL THERAPY | Facility: CLINIC | Age: 61
End: 2021-03-03
Payer: COMMERCIAL

## 2021-03-03 ENCOUNTER — APPOINTMENT (OUTPATIENT)
Dept: PHYSICAL THERAPY | Facility: CLINIC | Age: 61
End: 2021-03-03
Payer: COMMERCIAL

## 2021-03-03 ENCOUNTER — APPOINTMENT (OUTPATIENT)
Dept: EDUCATION SERVICES | Facility: CLINIC | Age: 61
End: 2021-03-03
Attending: PHYSICAL MEDICINE & REHABILITATION
Payer: COMMERCIAL

## 2021-03-03 ENCOUNTER — APPOINTMENT (OUTPATIENT)
Dept: SPEECH THERAPY | Facility: CLINIC | Age: 61
End: 2021-03-03
Payer: COMMERCIAL

## 2021-03-03 LAB
GLUCOSE BLDC GLUCOMTR-MCNC: 100 MG/DL (ref 70–99)
GLUCOSE BLDC GLUCOMTR-MCNC: 101 MG/DL (ref 70–99)
GLUCOSE BLDC GLUCOMTR-MCNC: 110 MG/DL (ref 70–99)
GLUCOSE BLDC GLUCOMTR-MCNC: 113 MG/DL (ref 70–99)
GLUCOSE BLDC GLUCOMTR-MCNC: 149 MG/DL (ref 70–99)
GLUCOSE BLDC GLUCOMTR-MCNC: 212 MG/DL (ref 70–99)
HBV SURFACE AB SERPL IA-ACNC: 0.25 M[IU]/ML
HBV SURFACE AG SERPL QL IA: NONREACTIVE
TACROLIMUS BLD-MCNC: 9.9 UG/L (ref 5–15)
TME LAST DOSE: NORMAL H

## 2021-03-03 PROCEDURE — 99232 SBSQ HOSP IP/OBS MODERATE 35: CPT | Performed by: PHYSICAL MEDICINE & REHABILITATION

## 2021-03-03 PROCEDURE — 92507 TX SP LANG VOICE COMM INDIV: CPT | Mod: GN

## 2021-03-03 PROCEDURE — 94668 MNPJ CHEST WALL SBSQ: CPT

## 2021-03-03 PROCEDURE — 250N000012 HC RX MED GY IP 250 OP 636 PS 637: Performed by: PHYSICIAN ASSISTANT

## 2021-03-03 PROCEDURE — 94640 AIRWAY INHALATION TREATMENT: CPT | Mod: 76

## 2021-03-03 PROCEDURE — 999N000157 HC STATISTIC RCP TIME EA 10 MIN

## 2021-03-03 PROCEDURE — 94640 AIRWAY INHALATION TREATMENT: CPT

## 2021-03-03 PROCEDURE — 36415 COLL VENOUS BLD VENIPUNCTURE: CPT | Performed by: PHYSICIAN ASSISTANT

## 2021-03-03 PROCEDURE — 97530 THERAPEUTIC ACTIVITIES: CPT | Mod: GO | Performed by: OCCUPATIONAL THERAPIST

## 2021-03-03 PROCEDURE — 97110 THERAPEUTIC EXERCISES: CPT | Mod: GP

## 2021-03-03 PROCEDURE — 250N000009 HC RX 250: Performed by: PHYSICIAN ASSISTANT

## 2021-03-03 PROCEDURE — 250N000013 HC RX MED GY IP 250 OP 250 PS 637: Performed by: PHYSICIAN ASSISTANT

## 2021-03-03 PROCEDURE — 250N000012 HC RX MED GY IP 250 OP 636 PS 637: Performed by: PHYSICAL MEDICINE & REHABILITATION

## 2021-03-03 PROCEDURE — 94762 N-INVAS EAR/PLS OXIMTRY CONT: CPT

## 2021-03-03 PROCEDURE — 128N000003 HC R&B REHAB

## 2021-03-03 PROCEDURE — 250N000011 HC RX IP 250 OP 636: Performed by: PEDIATRICS

## 2021-03-03 PROCEDURE — 80197 ASSAY OF TACROLIMUS: CPT | Performed by: PHYSICIAN ASSISTANT

## 2021-03-03 PROCEDURE — 97110 THERAPEUTIC EXERCISES: CPT | Mod: GO | Performed by: OCCUPATIONAL THERAPIST

## 2021-03-03 PROCEDURE — 99233 SBSQ HOSP IP/OBS HIGH 50: CPT | Performed by: PHYSICIAN ASSISTANT

## 2021-03-03 PROCEDURE — 250N000013 HC RX MED GY IP 250 OP 250 PS 637: Performed by: PHYSICAL MEDICINE & REHABILITATION

## 2021-03-03 PROCEDURE — 999N001017 HC STATISTIC GLUCOSE BY METER IP

## 2021-03-03 RX ORDER — OLANZAPINE 5 MG/1
5 TABLET ORAL AT BEDTIME
Status: DISCONTINUED | OUTPATIENT
Start: 2021-03-03 | End: 2021-03-04

## 2021-03-03 RX ORDER — PANTOPRAZOLE SODIUM 40 MG/1
40 TABLET, DELAYED RELEASE ORAL
Status: DISCONTINUED | OUTPATIENT
Start: 2021-03-04 | End: 2021-03-05 | Stop reason: HOSPADM

## 2021-03-03 RX ORDER — SULFAMETHOXAZOLE AND TRIMETHOPRIM 400; 80 MG/1; MG/1
80 TABLET ORAL
Status: DISCONTINUED | OUTPATIENT
Start: 2021-03-05 | End: 2021-03-05 | Stop reason: HOSPADM

## 2021-03-03 RX ORDER — TACROLIMUS 0.5 MG/1
1.5 CAPSULE ORAL
Status: DISCONTINUED | OUTPATIENT
Start: 2021-03-03 | End: 2021-03-04

## 2021-03-03 RX ORDER — VIT B COMP NO.3/FOLIC/C/BIOTIN 1 MG-60 MG
1 TABLET ORAL DAILY
Status: DISCONTINUED | OUTPATIENT
Start: 2021-03-03 | End: 2021-03-05 | Stop reason: HOSPADM

## 2021-03-03 RX ADMIN — METOPROLOL TARTRATE 25 MG: 25 TABLET, FILM COATED ORAL at 08:49

## 2021-03-03 RX ADMIN — PANTOPRAZOLE SODIUM 40 MG: 40 TABLET, DELAYED RELEASE ORAL at 08:48

## 2021-03-03 RX ADMIN — ALBUTEROL SULFATE 2.5 MG: 2.5 SOLUTION RESPIRATORY (INHALATION) at 17:39

## 2021-03-03 RX ADMIN — SULFAMETHOXAZOLE AND TRIMETHOPRIM 80 MG: 200; 40 SUSPENSION ORAL at 08:49

## 2021-03-03 RX ADMIN — OLANZAPINE 5 MG: 5 TABLET, FILM COATED ORAL at 21:53

## 2021-03-03 RX ADMIN — PREDNISONE 5 MG: 5 TABLET ORAL at 08:49

## 2021-03-03 RX ADMIN — ALBUTEROL SULFATE 2.5 MG: 2.5 SOLUTION RESPIRATORY (INHALATION) at 21:59

## 2021-03-03 RX ADMIN — POSACONAZOLE 300 MG: 100 TABLET, COATED ORAL at 14:08

## 2021-03-03 RX ADMIN — ACETYLCYSTEINE 2 ML: 100 INHALANT RESPIRATORY (INHALATION) at 21:59

## 2021-03-03 RX ADMIN — TACROLIMUS 1.5 MG: 5 CAPSULE ORAL at 08:49

## 2021-03-03 RX ADMIN — PREDNISONE 2.5 MG: 2.5 TABLET ORAL at 21:53

## 2021-03-03 RX ADMIN — ONDANSETRON 4 MG: 4 TABLET, ORALLY DISINTEGRATING ORAL at 14:07

## 2021-03-03 RX ADMIN — Medication 1 PACKET: at 08:52

## 2021-03-03 RX ADMIN — ALBUTEROL SULFATE 2.5 MG: 2.5 SOLUTION RESPIRATORY (INHALATION) at 07:50

## 2021-03-03 RX ADMIN — ALBUTEROL SULFATE 2.5 MG: 2.5 SOLUTION RESPIRATORY (INHALATION) at 12:13

## 2021-03-03 RX ADMIN — BUDESONIDE 0.5 MG: 0.5 INHALANT RESPIRATORY (INHALATION) at 07:52

## 2021-03-03 RX ADMIN — TACROLIMUS 1.5 MG: 0.5 CAPSULE ORAL at 18:12

## 2021-03-03 RX ADMIN — METOPROLOL TARTRATE 25 MG: 25 TABLET, FILM COATED ORAL at 21:53

## 2021-03-03 RX ADMIN — BUDESONIDE 0.5 MG: 0.5 INHALANT RESPIRATORY (INHALATION) at 21:59

## 2021-03-03 RX ADMIN — POSACONAZOLE 300 MG: 100 TABLET, COATED ORAL at 21:53

## 2021-03-03 RX ADMIN — ACETYLCYSTEINE 2 ML: 100 INHALANT RESPIRATORY (INHALATION) at 07:52

## 2021-03-03 RX ADMIN — INSULIN ASPART 1 UNITS: 100 INJECTION, SOLUTION INTRAVENOUS; SUBCUTANEOUS at 18:12

## 2021-03-03 RX ADMIN — Medication 1 TABLET: at 14:08

## 2021-03-03 RX ADMIN — POSACONAZOLE 300 MG: 100 TABLET, COATED ORAL at 08:49

## 2021-03-03 ASSESSMENT — MIFFLIN-ST. JEOR: SCORE: 1058.07

## 2021-03-03 NOTE — PLAN OF CARE
Problem: Goal/Outcome  Goal: Goal Outcome Summary  Outcome: Improving   FOCUS/GOAL  Bowel management, Bladder management, Nutrition/Feeding/Swallowing precautions, Medical management, and Mobility    ASSESSMENT, INTERVENTIONS AND CONTINUING PLAN FOR GOAL:  Pt's vitals and BGs stable. Still on O2 at 1L. Trach cares done this morning. Pt able to cough up secretions. No need for suction. Trach capped by SLP this morning. So far tolerating the capping. Pt eating % of meals. Omar counts until today. TF on hold. GJ tube patent and irrigated. Dressing changed this morning. Educated pt and  how to do G tube cares. Liquid meds switched to tabs or caps now. Pt tolerated taking pills by mouth. Pt continent of bowel and bladder using the toilet. Pt now progressed to mod I in room. PLC educ this aftern oon. Continue to prepare pt for discharge on Fru 3/5/21.

## 2021-03-03 NOTE — PLAN OF CARE
"Discharge Planner Post-Acute Rehab PT:      Discharge Plan: Home with family and outpatient PT. ADILSON 7 days (3/5/21 discharge).     Precautions: Falls, supplemental O2/monitor SpO2  Dialysis T/Th/Sat     Current Status:  Bed Mobility: IND  Transfer: SBA with 4WW  Gait: Up to 200 ft with 4WW and SBA  Stairs: 8x6\" steps with bilat HR and CGA, step-to pattern  Balance: Fair to good static, fair dynamic standing balance without UE support.  JOHNSON on 3/1/21: 43/56, indicating increased risk for falls with recommendation for use of 4WW for mobility. Pt would benefit from additional PT to help address LE strength and balance deficits.     Assessment:  Pt ambulating to and from therapy gyms with 4WW and SBA only to manage O2 tank (~170' a bout), able to participate in all therapy today on 1 L O2 with sats above 90%, nustep activity on RA. On track for discharge home Friday. -15 minutes during afternoon session due to nausea    Other Barriers to Discharge (DME, Family Training, etc): Pt has 4WW available for use.  "

## 2021-03-03 NOTE — PROGRESS NOTES
[facility]   Acute Rehabilitation Unit  Daily progress note    INTERVAL HISTORY  Kecia Blue was seen and examined this morning, seated in chair in her room.  She reports that she is feeling good today.  She had a long day yesterday with IR procedure followed by HD.  She reports to be tolerating trach capping well.  She continues on 1L O2, and feels comfortable discharging with that if needed given she has supplies at home.  She denies any increased shortness of breath or cough.  She had soft BP last night after dialysis, and did trigger sepsis protocol while there, but lactate returned 0.4.  Blood glucose running between 100-150 now with TF and insulin held.  Will continue to monitor.  She reports she is sleeping better now with less anxiety than earlier in her hospital stay, and she is agreeable to decreasing Zyprexa dose with hopes of weaning off.    Functionally, she is ambulating 300' with 4WW and standby assist.  She is currently mod I with dressing seated, min assist with sit to stand transfers from standard toilet seat, standby assist with oral cares standing edge of sink, and set-up for grooming/hygiene seated.  She completed dressing without O2, and did desat to 88 but recovered quickly.  SLP assisting with trach capping protocol, and educated patient and family on this yesterday.    ROS: 10 point ROS neg other than the symptoms noted above in the HPI.    MEDICATIONS    acetylcysteine  2 mL Inhalation BID     albuterol  2.5 mg Nebulization 4x Daily     budesonide  0.5 mg Nebulization BID     fiber modular (NUTRISOURCE FIBER)  1 packet Per Feeding Tube BID     insulin aspart  1-10 Units Subcutaneous TID AC     insulin aspart  1-7 Units Subcutaneous At Bedtime     metoprolol tartrate  25 mg Oral or Feeding Tube BID     multivitamin RENAL  1 tablet Oral Daily     OLANZapine  10 mg Oral or Feeding Tube At Bedtime     [START ON 3/4/2021] pantoprazole  40 mg Oral QAM  "AC     posaconazole  300 mg Oral TID     predniSONE  2.5 mg Oral QPM     predniSONE  5 mg Oral Daily     [START ON 3/5/2021] sulfamethoxazole-trimethoprim  80 mg Oral or Feeding Tube Once per day on Mon Wed Fri     tacrolimus  1.5 mg Oral BID IS        acetaminophen, alteplase (tPA) 10 mg/250mL infusion, dextrose, glucose **OR** dextrose **OR** glucagon, fentaNYL, flumazenil, heparin (porcine), heparin, lidocaine 4%, lidocaine (buffered or not buffered), midazolam, naloxone **OR** naloxone **OR** naloxone **OR** naloxone, ondansetron, - MEDICATION INSTRUCTIONS -, prochlorperazine, sodium chloride (PF), sodium chloride (PF), - MEDICATION INSTRUCTIONS -     PHYSICAL EXAM  /64 (BP Location: Right arm)   Pulse 97   Temp 97.2  F (36.2  C) (Oral)   Resp 20   Ht 1.6 m (5' 3\")   Wt 50.9 kg (112 lb 3.2 oz)   LMP 06/07/2014 (Exact Date)   SpO2 98%   BMI 19.88 kg/m    Gen: awake, seated upright in chair, pleasant and cooperative, in NAD  HEENT: NCAT, anicteric sclera, EOMI, MMM, trach capped  Cardio: RRR  Pulm: non-labored on 1L by NC  Abd: soft, non-tender, non-distended, PEG/J present  Ext: no peripheral edema   Neuro/MSK: AAOx3, responds appropriately, speech clear/fluent, strength 4-5 throughout    LABS  CBC RESULTS:   Recent Labs   Lab Test 03/01/21  0637 02/27/21  0853 02/25/21  0542   WBC 5.6 4.2 2.9*   RBC 2.97* 3.09* 3.15*   HGB 9.5* 9.8* 9.9*   HCT 29.6* 30.6* 32.0*    99 102*   MCH 32.0 31.7 31.4   MCHC 32.1 32.0 30.9*   RDW 19.4* 19.7* 20.5*    268 293     Last Basic Metabolic Panel:  Recent Labs   Lab Test 03/02/21  0750 03/01/21  1559 03/01/21  0637   * 126* 128*   POTASSIUM 4.2 5.5* 5.5*   CHLORIDE 91* 92* 92*   CO2 23 23 26   ANIONGAP 14 11 10   * 117* 188*   * 93* 82*   CR 4.25* 3.69* 3.45*   GFRESTIMATED 11* 13* 14*   CHELSEY 8.4* 8.8 8.4*       Rehabilitation - continue comprehensive acute inpatient rehabilitation program with multidisciplinary approach including " therapies, rehab nursing, and physiatry following. See interval history for updates.      ASSESSMENT AND PLAN  Kecia Blue is a 60 year old yo female with past medical history of HTN, ESRD on dialysis, ILD s/p bilateral lung transplant 03/2018 with complicated postoperative course including: right mainstem bronchial stenosis aspergillus empyema, EBV viremia, CMV viremia.  Kecia was admitted to an outside hospital on 1/22 with acute hypoxemic respiratory failure and new lung opacities on imaging, was ultimately diagnosed with PJP pneumonia, she was intubated and transferred to [facility] on 1/24, course further complicated by ARDS on 2/3, prolonged hypoxic respiratory failure s/p tracheostomy on 2/12 and PEG-J on 2/16, underwent dilation of right main bronchus on 2/19. Admitted to acute rehab unit 2/25/21 for ongoing rehabilitation and medical management.      Acute hypoxemic respiratory failure  Sepsis in setting of PJP pneumonia, possible secondary pneumonia  S/p tracheostomy placement (2/12/21)  Presented to outside hospital with acute hypoxemic respiratory failure secondary to PJP pneumonia. She failed extubation trials x2, developed ARDS.  Required proning, inhaled epoprostenol, pressors. required tracheostomy: 2/12. completed steroid course  2/17. recieved Bactrim X 21 days. s/p  Bronchoscopy on 2/19 (cultures showed Staph epidermidis)  and requiring only trach dome since.  VFSS 2/24 and approved for regular diet by SLP.  - Hospitalist following, appreciate assistance  - Per pulm, continue supplemental oxygen with humidified NC, TD prn for additional humidity with tenacious sedations  - Cover trach with PMSV (day) or HME (Heat Moist Exchanger overnight)   - Continue #6 cuffless shiley per IP   - Continue Albuterol QID, Mucomyst BID, Pulmicort BID   - CXR weekly (next 3/8)  - F/u outpatient repeat bronchoscopy in 6 weeks (~4/2)  - Trach capping trial started yesterday, capping 24 hours today.  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 EBV viremia  Immunosuppression: Tacrolimus held last night then reduced to 1.5 mg BID per pulm.  Goal 8-10.  Continue daily levels per pulm transplant.   Prednisone 5 mg q am/2.5 mg po q PM (transitioned from burst steroids on 2-18-21)  Prophylaxis: Bactrim q M, W, F for PJP ppx  - CMV level q 2 weeks (VGCV for CMV ppx done through 2-25-21)  - EBV level q 4 weeks (due 3-22-21)  - Appreciate hospitalist and pulmonary transplant assistance      Aspergillus Empyema   -Continue posaconazole 300 mg TID, plan for indefinite course per ID. Levels per pulm recs (<0.2 on 2-25-21)  - F/u ID as outpatient     ESRD  Hyperkalemia  Prior to hospitalization, HD biweekly (M/Th) per [facility] nephrology. Required CRRT 2/4 to 2/8.  Access is AVF with partial graft, challenging to access.   - Appreciate ongoing nephrology support.   - Continue HD T/Th/Sat  - Hyperkalemia 3/1 to 5.5.  Planed for extra HD run, however issues with access.  Returned to ARU without additional run, and given kayexalate x1.  - Now s/p fistulogram and declot by IR on 3/2, with successful HD run after  - Will need extra run on 3/5 in order to resume typical M/W/F OP schedule     Acute on Chronic Anemia- Hgb 9.9 2/25 receiving EPO with dialysis  - Trend CBC  - Hgb stable at 9.5 as of 3/1     Leukopenia- WBC 2.9 2/25 felt to be medication related  - Monitor CBC  - WBC improved to 5.6 as of 3/1     Atrial fibrillation with RVR    Occurring intermittently, seen and evaluated by EP   - Follow up with EP outpatient with zio patch after discharge from ARU  - Continue metoprolol,  apixaban.      Steroid/ Tube feed induced Hyperglycemia:  Hgb A1C 6.0% 1/24/21  - Lantus 10 units daily, held as of 3/2 as now holding TF  - sliding scale insulin   - Hypoglycemia protocol      Severe Malnutrition- in context of chronic illness.  s/p PEG-J tube 2/16/21  - Per discussion with RD on 3/1, ok to begin holding TF given improving oral intake.  Delayed due to having already received Lantus, but held last night.  Hopeful that will not need to resume TF  - Appreciate nutrition consult  - Continue renal diet  - Keep feeding tube in place x3 months per pulm transplant     Anxiety: continue bedtime and as needed olanzapine, hydroxyzine prn  - Decrease Zyprexa to 5 mg tonight, continue taper as able prior to discharge     Hx anisocoria: no deficits on exam, negative CTH. Monitor.     HTN: holding pta meds (Coreg, amlodipine)  - Continue metoprolol  - Monitor BP      1. Adjustment to disability:  Clinical psychology to eval and treat as indicated  2. FEN: renal diet + TF  3. Bowel: continent, last BM 2/26  4. Bladder: continent, low urine output given ESRD, PVRs at admission: 1 so far at 0  5. DVT Prophylaxis: apixaban  6. GI Prophylaxis: PPI  7. Code: full, confirmed on admission  8. Disposition: home with family support  9. ELOS: 7-10 days  10. Follow up Appointments on Discharge: EP cardiology with Zio patch prior to discharge.  ID for Posaconazole management for aspergillus empyema and history of CMV and EBV viremia, Pulmonology for repeat bronch (4-2-21), Nephrology for continued dialysis, ENT for trach F/U      Patient seen and discussed with Dr. Shree Ford, PM&R Staff Physician    Lissett Rodriguez PA-C  Physical Medicine & Rehabilitation

## 2021-03-03 NOTE — PLAN OF CARE
A&O x4. VSS on 1LPM via nasal cannula. Lung sounds coarse. No void this shift, LBM 3/2 per pt report and bowel sounds active. Transfers CGA1 with walker and gait belt, has been in bed since returning from dialysis. Skin intact ex trach site, GJ tube with dressing changed CDI, fistula LUE with dressing CDI. Pt refused trach cares due to being tired from dialysis x2. Denies pain. Denies numbness/tingling. Regular diet, thin liquids, denies N/V.  at bedtime and 101 at 0230, no insulin given. PIV R arm SL. Continue POC.

## 2021-03-03 NOTE — PROGRESS NOTES
Met with patient and  Ranjan to update the discharge planning discussion. She will discharge to home 3/5,  to provide ride home. The hope is to decannulate trach tomorrow. If not able to decannulate, patient does have PLC trach teaching scheduled 3/5. Trach and suctioning orders sent to [facility] (Ph [phone], fax [phone]), confirmed with Krishna that they can provide and would be able to deliver/teach on ARU prior to discharge. Will have ARU charge nurse update [facility] tomorrow on decannulation or not. Tube feeding on hold, patient eating well, likely not in need of home enteral. PLC teaching done today on GJ tube cares. She will go home with GJ tube, discussed water flushing for patency, and ordering more syringes on [employer] or [employer] online, if needed. Patient has home neb machine and O2 setup already, will bring in portable concentrator for trip home, if needed. Home dialysis to resume at [facility] on Monday 3/8. Updated Transplant Coordinator Mikayla on patient status and discharge plan, Pulmonary follow-up scheduled 3/10. Patient goes to local clinic for lab draw. She will do outpatient therapy.

## 2021-03-03 NOTE — PROGRESS NOTES
HEMODIALYSIS TREATMENT NOTE    Date: 3/2/2021  Time: 7:33 PM    Data:  Pre Wt: 53.5 kg (117 lb 15.1 oz)   Desired Wt: 50.5 kg   Post Wt: 50.5 kg (111 lb 5.3 oz)  Weight change: 3 kg  Ultrafiltration - Post Run Net Total Removed (mL): 3000 mL  Vascular Access Status: Fistula and Graft  patent  Dialyzer Rinse: Clear  Total Blood Volume Processed: 57.77 L   Total Dialysis (Treatment) Time: 3.5   Dialysate Bath: K 3, Ca 3  Heparin 500 units loading + 500 units/hr    Lab:   Yes;Hep B series/Lactic Acid    Interventions:  Pt ran post decot today. Pt had a stable uncomplicated 3.5 hours of HD. 3L of fluid was pulled. Ran @ BFR of 275 d/t  alarm. Lactic acid lab drawn per protocol today as sepsis risk triggered during tx. B/G was 142; unable to administer due coverage as insulin pen was not sent with pt. Krystyna GIL updated, and she said, she will recheck when pt gets back to their unit. Pt ordered and ate dinner here. Epogen administered as ordered. Post-op Ancef ended here, and line was flushed with 10mls NS. Rinsed back post tx, needles were pulled, new dressings applied, and sites were held for about 10mins to help achieve hemostasis. Hand off report given to LOUISE Galvez.    Assessment:  -Calm & Cooperative  -VSS  -A & O X 4     Plan:    Next HD per renal team

## 2021-03-03 NOTE — PROGRESS NOTES
Calorie Count:  3/2: 739 kcal and 42 g protein (dinner meal only as pt was NPO for procedure then went to dialysis)    Angeli Fuller RD, CNSC, LD  ARU RD pager: [phone]

## 2021-03-03 NOTE — PLAN OF CARE
Face-to-face evaluation on 03/03/21    If patient goes home with tracheostomy, will need tracheostomy and suctioning supplies as ordered.       Lissett Rodriguez PA-C  Physical Medicine & Rehabilitation

## 2021-03-03 NOTE — PROGRESS NOTES
Spoke with pt and updated pt on confirmed OP HD chair time/schedule. Information discussed with pt, added to AVS and listed below. Pt also in contact with her HD facility. Pt aware that she is on track to discharge home on Friday 03/05. Pt mentioned that she would prefer to dialyze at her OP facility on Saturday rather then Friday and she would call the location to see if they could accommodate that. SWer later received a call from pt  Ranjan stating that pt's OP HD facility can NOT accommodate that and pt will have to go for HD on Friday morning. SW left Ranjan a vm back, per request, acknowledging the update. SW updated the provider and Charge RN. Clinicals were faxed to [facility] this morning. Planning OP therapy at discharge and  will transport home. Transplant Ruchi Rainey updated today via email. No other SW needs identified at this time.     Outpatient Dialysis:   [facility]   [address] [place], [place]  Phone: [phone], Fax: [phone]  *Spoke and coordinated with Juana the RN   Chair is MARSHA, first chair is Monday 03/08.  Monday-2:00pm, Wednesday & Friday-1:30pm    VERENICE Haque, [facility] Acute Rehab Unit   Phone: [phone]  I   Pager: [phone]

## 2021-03-03 NOTE — PLAN OF CARE
Discharge Plan: home - but may not need HH sptx if ends up being decannulated prior to d/c     Precautions: tracheostomy and use of PMSV; fall     Current Status:  Communication: Pt  communicates with use of PMSV -Passy Musa speaking valve --has a # 6 shiley cuffless trach- pt is completely independent with donning and doffing PMSV and care of speaking valve; further pt is independent with wearing PMSV- wears throughout the day without difficulty and follows guidelines of removing PMSV when sleeping, or if increaes of SOB or WOB or if O2 sats dip below 90%--  however pt has not been experiencing any of this- is tolerating well. Further pt is wearing nasal cannula during the day and just humidity at night   Cognition: WFL- intact  Swallow: tolerating regular diet with thin liquids without difficulty -- had VFSS on 2/24- no aspiration and was advanced at that time to regular diet. Pt using safe swallow strategies independently- swallow goals met.      Assessment: Upon clinician arrival to room, patient had already independently placed to the tracheotomy cap and O2 sats remaining 96 to 97%. Checked on patient multiple times during the day and consistently tolerating the capping.  We will plan for overnight capping as well with potential decannulation on 3/4 pending ability to maintain capping for 24 hours.  Overnight, patient should have pulse oximetry as a precautionary measure to ensure she is remaining at adequate levels of O2 saturations.     Other Barriers to Discharge (Family Training, etc): will need training in trach cares if pt is not decannulated before d/c

## 2021-03-03 NOTE — PROGRESS NOTES
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 She was admitted to [facility] 1/4-2/25/21 for acute hypoxic respiratory failure found to have PJP pneumonia w/ stay c/b ARDS requiring tracheostomy, PEG/J, and dilation of right main bronchus as well as a fib w/ RVR. Transferred to ARU for ongoing rehabilitation. IM following d/t transplant hx.      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 On 2/19/21 had right mainstem bronchus dilation. Recent supratherapeutic Tacrolimus levels, last 9.9 on 3/3. Goal 8-10.  - Tacrolimus 1.5 mg BID. Monitor daily levels.  - Prednisone 5 mg AM/2.5 mg PM (no further taper planned at this time).   - Continue prophylaxis w/ Bactrim Q MWF.   - CMP, CBC, Mg, Phos Q M/Th and CXR Q Monday.   - Pulmonology to follow  - Repeat bronch in ~6 weeks (first week of April - TBD).      #Acute Hypoxemic Respiratory Failure s/p Tracheostomy  #PJP Pneumonia  Presented in respiratory failure and ultimately failed extubation trials x2 and developed ARDS. Required proning, inhaled epoprostenol, pressors. Tracheostomy done 2/12 and exchanged for #6 cuffless Shiley on 2/22. Completed steroid taper 2/17 and 21 d course of Bactrim 2/15. O2 sats stable on 1L NC. Tolerating trach capping.  - Wean supplemental O2 as able. This should be humidified when in use. TD can be used PRN for additional humidity if needed for secretions.   - Trial of trach capping for next  24 hours. If tolerates, may be able to be de-cannulated 3/4.    - Continue QID Albuterol, BID Mucomyst, BID Pulmicort nebs.      #History of Left Sided Aspergillus Empyema - Diagnosed 10/2019 s/p amphotericin beads and is on indefinite posaconazole. Last level 2/25 still very low but transitioned from suspension/feeding tube to PO dosing that should be taken w/ food.   - Continue Posaconazole 300 mg PO TID.   - Posaconazole level pending from 3/2.   - OP ID f/u.     #ESRD - Currently on HD Tu/Th/Sat (was biweekly prior to hospitalization) via AVF w/ partial graft that can be challenging to access. Had CRRT 2/5-2/8 d/t hemodynamic instability, otherwise has tolerated iHD (temp line removed 2/9). Hyperkalemic 2/27 and 3/1 in setting of ERSD, supratherapeutic Tacro level, Bactrim, and fistula clotting. S/p IR fistulogram on 3/2.  - Nephrology following.   - Planning for next HD on 3/5 prior to discharge   - Has required occasional midodrine during HD.      #History of CMV and EBV Viremias - Last CMV PCR 2/25 undetected. EBV level 2/22/21 increased to 75,709 (log of 4.9) from 5K (log 3.8) on 1/25/21. This increase is likely secondary to acute illness and recent steroid burst.   - CMV PCR q 2 weeks (next 3/11) since recently stopping VGCV prophylactic course   - Next EBV PCR due 3/22.     #PAF - In setting of lung disease and critical illness. Short self limited runs in the hospital. Echo 1/25/21 w/ normal LVEF and RV size/function, high RA pressure. CHADSVasc 1 with no need for long term oral anticoagulation (per 2/22 EP note). Despite this, was on Eliquis upon arrival to ARU which was discontinued after discussion with patient and pulmonology. Her only DVT was line associated after transplant in 2018, not on AC prior to hospitalization. NSR on exam.   - Continue Metoprolol 25 mg BID (reduced from 50 mg BID d/t borderline BPs).   - Needs OP Ziopatch arranged at ARU discharge.      #Steroid and TF Induced Hyperglycemia  - A1c 6% on 1/24/21. On cycled TFs overnight and now taking PO. Steroid taper completed 2/17. Lantus discontinued 3/1 with stopping of TF. BG controlled w/o sliding scale requirements.  - High Novolog sliding scale AC/HS. May not require at discharge.    #Non-Severe Malnutrition - In setting of chronic illness. TF initiated during hospitalization d/t intubation. PEG-J placed 2/16 by IR. Now meeting caloric goals with PO intake alone. TF discontinued 3/2.  - 2 g K diet + PO supplements  - PEG-J to remain in place for 3 months (remove ~5/16)    #Anxiety - Zyprexa decreased to 5 mg q HS by PM&R. Continue to wean as able.    MERRICK Tucker  Hospitalist Service  [facility]  Contact information available via [facility] Paging/Directory  ____________________________________________________________________    Interval History  Doing well today. Breathing comfortably. O2 stable on 1L. Tried ambulating around room on RA with occasional desats but quickly recovered with rest. Tolerating trach capping. Cough improving. Stools becoming more formed. Producing more urine, no dysuria. Denies chest pain, SOB, n/v or abdominal pain.    Data reviewed today: I reviewed all medications, new labs and imaging results over the last 24 hours.    Physical Exam  Vital Signs: Temp: 97.2  F (36.2  C) Temp src: Oral BP: 122/70 Pulse: 115   Resp: 18 SpO2: 97 % O2 Device: Nasal cannula Oxygen Delivery: 1 LPM  Weight: 112 lbs 3.2 oz  General: Awake. Sitting up in chair. NAD.  at bedside.  HEENT: Anicteric sclera. MMM. Trach cap in place.  CV: RRR  Respiratory: Normal effort on 1L via NC. Coarse throughout.  GI: Abdomen is soft, non-tender, and non-distended. Bowel sounds present. GJ tube in place.  Extremities: No peripheral edema. Warm and well perfused.  Skin: No rashes or jaundice on exposed areas.

## 2021-03-03 NOTE — PLAN OF CARE
[facility]     The [place] ADL Protocol is a standardized measure that assesses activity tolerance with IADL performance. The protocol is composed of 5 activities involving upper limbs, lower limbs, and trunk movements.   1. Lifting and moving weighted objects on a table.   2. Walking 18 meters while carrying weighted bags.   3. Stacking items on high/low shelves.   4. Hanging clothing on a clothesline.   5. Walking 18 meters without carrying items.      Patient Performance  Variables Measured Baseline Final Final Normative data for healthy individuals ages 40-60   Duration (minutes) N/A  15 min 4 min   Respiratory rate (breaths per minute) 18-20 22-24 23 breaths per min   O2 saturation (%)  99 98 97% on room air   Heart Rate (beats per min)  110 106 81 bpm   Dyspnea (Omni 0-10) 4 7 .5 little to no shortness of breath   Fatigue (Omni 0-10) 4 7 .5 little to no fatigue   Blood Pressure 124/64 115/59 123/88         Interpretation (factors impacting performance, pre-post improvement, etc)   Pt completed the [place] Protocol in 15 minutes, compared to 4 minutes for the average adult. Pt took two seated rest breaks throughout the 15 minutes to recover. Modifications included raising the clothing basket to waste height due to pain with bending over. Pt was also on 1L of O2 through nasal canula throughout the activity and used a 4ww during ambulation and for rest breaks. Pt appearing more fatigued as the activity progressed. Unclear as to why BP and KS went down after the activity, because it is not a typical response with this type of physical activity.     References:  Adilene Chavez et al. Development, Validity and Reliability of the [place] Activities of Daily Living Protocol for Subjects With COPD. Respiratory Care March 2017, 62 3) [phone].

## 2021-03-04 ENCOUNTER — APPOINTMENT (OUTPATIENT)
Dept: PHYSICAL THERAPY | Facility: CLINIC | Age: 61
End: 2021-03-04
Payer: COMMERCIAL

## 2021-03-04 ENCOUNTER — APPOINTMENT (OUTPATIENT)
Dept: SPEECH THERAPY | Facility: CLINIC | Age: 61
End: 2021-03-04
Payer: COMMERCIAL

## 2021-03-04 ENCOUNTER — APPOINTMENT (OUTPATIENT)
Dept: OCCUPATIONAL THERAPY | Facility: CLINIC | Age: 61
End: 2021-03-04
Payer: COMMERCIAL

## 2021-03-04 LAB
ANION GAP SERPL CALCULATED.3IONS-SCNC: 12 MMOL/L (ref 3–14)
BUN SERPL-MCNC: 83 MG/DL (ref 7–30)
CALCIUM SERPL-MCNC: 7.9 MG/DL (ref 8.5–10.1)
CHLORIDE SERPL-SCNC: 98 MMOL/L (ref 94–109)
CO2 SERPL-SCNC: 23 MMOL/L (ref 20–32)
CREAT SERPL-MCNC: 3.98 MG/DL (ref 0.52–1.04)
ERYTHROCYTE [DISTWIDTH] IN BLOOD BY AUTOMATED COUNT: 19.7 % (ref 10–15)
GFR SERPL CREATININE-BSD FRML MDRD: 12 ML/MIN/{1.73_M2}
GLUCOSE BLDC GLUCOMTR-MCNC: 112 MG/DL (ref 70–99)
GLUCOSE BLDC GLUCOMTR-MCNC: 152 MG/DL (ref 70–99)
GLUCOSE BLDC GLUCOMTR-MCNC: 154 MG/DL (ref 70–99)
GLUCOSE BLDC GLUCOMTR-MCNC: 179 MG/DL (ref 70–99)
GLUCOSE BLDC GLUCOMTR-MCNC: 183 MG/DL (ref 70–99)
GLUCOSE SERPL-MCNC: 127 MG/DL (ref 70–99)
HCT VFR BLD AUTO: 29.6 % (ref 35–47)
HGB BLD-MCNC: 9.3 G/DL (ref 11.7–15.7)
LABORATORY COMMENT REPORT: NORMAL
MAGNESIUM SERPL-MCNC: 1.6 MG/DL (ref 1.6–2.3)
MCH RBC QN AUTO: 32.2 PG (ref 26.5–33)
MCHC RBC AUTO-ENTMCNC: 31.4 G/DL (ref 31.5–36.5)
MCV RBC AUTO: 102 FL (ref 78–100)
PHOSPHATE SERPL-MCNC: 6.1 MG/DL (ref 2.5–4.5)
PLATELET # BLD AUTO: 237 10E9/L (ref 150–450)
POSACONAZOLE SERPL-MCNC: 5.7 UG/ML (ref 0.7–5)
POTASSIUM SERPL-SCNC: 4.5 MMOL/L (ref 3.4–5.3)
RBC # BLD AUTO: 2.89 10E12/L (ref 3.8–5.2)
SARS-COV-2 RNA RESP QL NAA+PROBE: NEGATIVE
SODIUM SERPL-SCNC: 133 MMOL/L (ref 133–144)
SPECIMEN SOURCE: NORMAL
TACROLIMUS BLD-MCNC: 14.7 UG/L (ref 5–15)
TME LAST DOSE: NORMAL H
WBC # BLD AUTO: 7.5 10E9/L (ref 4–11)

## 2021-03-04 PROCEDURE — 36415 COLL VENOUS BLD VENIPUNCTURE: CPT | Performed by: PHYSICIAN ASSISTANT

## 2021-03-04 PROCEDURE — 999N000150 HC STATISTIC PT MED CONFERENCE < 30 MIN: Performed by: PHYSICAL THERAPIST

## 2021-03-04 PROCEDURE — 94640 AIRWAY INHALATION TREATMENT: CPT

## 2021-03-04 PROCEDURE — 80048 BASIC METABOLIC PNL TOTAL CA: CPT | Performed by: PHYSICIAN ASSISTANT

## 2021-03-04 PROCEDURE — 85027 COMPLETE CBC AUTOMATED: CPT | Performed by: PHYSICIAN ASSISTANT

## 2021-03-04 PROCEDURE — 999N000125 HC STATISTIC PATIENT MED CONFERENCE < 30 MIN: Performed by: SPEECH-LANGUAGE PATHOLOGIST

## 2021-03-04 PROCEDURE — 97110 THERAPEUTIC EXERCISES: CPT | Mod: GP | Performed by: PHYSICAL THERAPIST

## 2021-03-04 PROCEDURE — 250N000012 HC RX MED GY IP 250 OP 636 PS 637: Performed by: PHYSICIAN ASSISTANT

## 2021-03-04 PROCEDURE — 128N000003 HC R&B REHAB

## 2021-03-04 PROCEDURE — 250N000013 HC RX MED GY IP 250 OP 250 PS 637: Performed by: PHYSICIAN ASSISTANT

## 2021-03-04 PROCEDURE — 99233 SBSQ HOSP IP/OBS HIGH 50: CPT | Performed by: PHYSICAL MEDICINE & REHABILITATION

## 2021-03-04 PROCEDURE — 83735 ASSAY OF MAGNESIUM: CPT | Performed by: PHYSICIAN ASSISTANT

## 2021-03-04 PROCEDURE — 99232 SBSQ HOSP IP/OBS MODERATE 35: CPT | Performed by: PHYSICIAN ASSISTANT

## 2021-03-04 PROCEDURE — 999N000125 HC STATISTIC PATIENT MED CONFERENCE < 30 MIN: Performed by: OCCUPATIONAL THERAPIST

## 2021-03-04 PROCEDURE — 97535 SELF CARE MNGMENT TRAINING: CPT | Mod: GO | Performed by: OCCUPATIONAL THERAPIST

## 2021-03-04 PROCEDURE — 250N000012 HC RX MED GY IP 250 OP 636 PS 637: Performed by: PHYSICAL MEDICINE & REHABILITATION

## 2021-03-04 PROCEDURE — 97110 THERAPEUTIC EXERCISES: CPT | Mod: GO | Performed by: OCCUPATIONAL THERAPIST

## 2021-03-04 PROCEDURE — 94668 MNPJ CHEST WALL SBSQ: CPT

## 2021-03-04 PROCEDURE — 84100 ASSAY OF PHOSPHORUS: CPT | Performed by: PHYSICIAN ASSISTANT

## 2021-03-04 PROCEDURE — 80197 ASSAY OF TACROLIMUS: CPT | Performed by: PHYSICIAN ASSISTANT

## 2021-03-04 PROCEDURE — 99233 SBSQ HOSP IP/OBS HIGH 50: CPT | Performed by: PHYSICIAN ASSISTANT

## 2021-03-04 PROCEDURE — U0003 INFECTIOUS AGENT DETECTION BY NUCLEIC ACID (DNA OR RNA); SEVERE ACUTE RESPIRATORY SYNDROME CORONAVIRUS 2 (SARS-COV-2) (CORONAVIRUS DISEASE [COVID-19]), AMPLIFIED PROBE TECHNIQUE, MAKING USE OF HIGH THROUGHPUT TECHNOLOGIES AS DESCRIBED BY CMS-2020-01-R: HCPCS | Performed by: PHYSICIAN ASSISTANT

## 2021-03-04 PROCEDURE — 999N001017 HC STATISTIC GLUCOSE BY METER IP

## 2021-03-04 PROCEDURE — 94640 AIRWAY INHALATION TREATMENT: CPT | Mod: 76

## 2021-03-04 PROCEDURE — 250N000009 HC RX 250: Performed by: PHYSICIAN ASSISTANT

## 2021-03-04 PROCEDURE — 999N000157 HC STATISTIC RCP TIME EA 10 MIN

## 2021-03-04 PROCEDURE — 97530 THERAPEUTIC ACTIVITIES: CPT | Mod: GP | Performed by: PHYSICAL THERAPIST

## 2021-03-04 PROCEDURE — 250N000013 HC RX MED GY IP 250 OP 250 PS 637: Performed by: PHYSICAL MEDICINE & REHABILITATION

## 2021-03-04 PROCEDURE — U0005 INFEC AGEN DETEC AMPLI PROBE: HCPCS | Performed by: PHYSICIAN ASSISTANT

## 2021-03-04 RX ORDER — TACROLIMUS 1 MG/1
1 CAPSULE ORAL 2 TIMES DAILY
Qty: 60 CAPSULE | Refills: 0 | Status: SHIPPED | OUTPATIENT
Start: 2021-03-04 | End: 2021-03-04

## 2021-03-04 RX ORDER — TACROLIMUS 1 MG/1
1 CAPSULE ORAL
Status: DISCONTINUED | OUTPATIENT
Start: 2021-03-05 | End: 2021-03-05 | Stop reason: HOSPADM

## 2021-03-04 RX ORDER — PANTOPRAZOLE SODIUM 40 MG/1
40 TABLET, DELAYED RELEASE ORAL
Qty: 30 TABLET | Refills: 0 | Status: SHIPPED | OUTPATIENT
Start: 2021-03-05 | End: 2021-10-21

## 2021-03-04 RX ORDER — METOPROLOL TARTRATE 25 MG/1
25 TABLET, FILM COATED ORAL 2 TIMES DAILY
Qty: 60 TABLET | Refills: 0 | Status: SHIPPED | OUTPATIENT
Start: 2021-03-04 | End: 2021-04-08

## 2021-03-04 RX ORDER — BUDESONIDE 0.5 MG/2ML
0.5 INHALANT ORAL 2 TIMES DAILY
Qty: 60 AMPULE | Refills: 0 | Status: SHIPPED | OUTPATIENT
Start: 2021-03-04 | End: 2021-04-08

## 2021-03-04 RX ORDER — SULFAMETHOXAZOLE AND TRIMETHOPRIM 400; 80 MG/1; MG/1
1 TABLET ORAL
Qty: 12 TABLET | Refills: 0 | Status: SHIPPED | OUTPATIENT
Start: 2021-03-05 | End: 2021-04-08 | Stop reason: ALTCHOICE

## 2021-03-04 RX ORDER — POSACONAZOLE 100 MG/1
300 TABLET, DELAYED RELEASE ORAL 3 TIMES DAILY
Qty: 270 TABLET | Refills: 0 | Status: SHIPPED | OUTPATIENT
Start: 2021-03-04 | End: 2021-03-05

## 2021-03-04 RX ORDER — ONDANSETRON 4 MG/1
4 TABLET, ORALLY DISINTEGRATING ORAL EVERY 6 HOURS PRN
Qty: 12 TABLET | Refills: 0 | Status: ON HOLD | OUTPATIENT
Start: 2021-03-04 | End: 2021-09-22

## 2021-03-04 RX ORDER — PREDNISONE 5 MG/1
5 TABLET ORAL EVERY MORNING
Qty: 30 TABLET | Refills: 0 | Status: SHIPPED | OUTPATIENT
Start: 2021-03-04 | End: 2021-03-04

## 2021-03-04 RX ORDER — PREDNISONE 2.5 MG/1
2.5 TABLET ORAL EVERY EVENING
Qty: 30 TABLET | Refills: 0 | Status: SHIPPED | OUTPATIENT
Start: 2021-03-04 | End: 2021-04-06

## 2021-03-04 RX ORDER — PREDNISONE 5 MG/1
5 TABLET ORAL EVERY MORNING
Qty: 30 TABLET | Refills: 0 | Status: SHIPPED | OUTPATIENT
Start: 2021-03-04 | End: 2021-04-06

## 2021-03-04 RX ORDER — ACETYLCYSTEINE 100 MG/ML
2 SOLUTION ORAL; RESPIRATORY (INHALATION) 2 TIMES DAILY
Qty: 120 ML | Refills: 0 | Status: SHIPPED | OUTPATIENT
Start: 2021-03-04 | End: 2021-04-03

## 2021-03-04 RX ORDER — CONTAINER,EMPTY
1 EACH MISCELLANEOUS ONCE
Qty: 1 EACH | Refills: 0 | Status: SHIPPED | OUTPATIENT
Start: 2021-03-04 | End: 2021-03-04

## 2021-03-04 RX ORDER — POSACONAZOLE 100 MG/1
300 TABLET, DELAYED RELEASE ORAL 3 TIMES DAILY
Qty: 270 TABLET | Refills: 0 | Status: SHIPPED | OUTPATIENT
Start: 2021-03-04 | End: 2021-03-04

## 2021-03-04 RX ORDER — TACROLIMUS 1 MG/1
1 CAPSULE ORAL
Status: DISCONTINUED | OUTPATIENT
Start: 2021-03-05 | End: 2021-03-04

## 2021-03-04 RX ORDER — TACROLIMUS 1 MG/1
1 CAPSULE ORAL 2 TIMES DAILY
Qty: 60 CAPSULE | Refills: 0 | Status: SHIPPED | OUTPATIENT
Start: 2021-03-04 | End: 2021-03-30

## 2021-03-04 RX ORDER — BLOOD-GLUCOSE METER
KIT MISCELLANEOUS
Qty: 1 KIT | Refills: 0 | Status: SHIPPED | OUTPATIENT
Start: 2021-03-04 | End: 2021-04-08

## 2021-03-04 RX ORDER — VIT B COMP NO.3/FOLIC/C/BIOTIN 1 MG-60 MG
1 TABLET ORAL DAILY
Qty: 30 TABLET | Refills: 0 | Status: SHIPPED | OUTPATIENT
Start: 2021-03-05 | End: 2021-04-06

## 2021-03-04 RX ORDER — ALBUTEROL SULFATE 0.83 MG/ML
2.5 SOLUTION RESPIRATORY (INHALATION) 4 TIMES DAILY
Qty: 360 ML | Refills: 0 | Status: SHIPPED | OUTPATIENT
Start: 2021-03-04 | End: 2021-04-08

## 2021-03-04 RX ADMIN — TACROLIMUS 1.5 MG: 0.5 CAPSULE ORAL at 08:10

## 2021-03-04 RX ADMIN — INSULIN ASPART 1 UNITS: 100 INJECTION, SOLUTION INTRAVENOUS; SUBCUTANEOUS at 12:56

## 2021-03-04 RX ADMIN — BUDESONIDE 0.5 MG: 0.5 INHALANT RESPIRATORY (INHALATION) at 21:13

## 2021-03-04 RX ADMIN — METOPROLOL TARTRATE 25 MG: 25 TABLET, FILM COATED ORAL at 08:08

## 2021-03-04 RX ADMIN — PANTOPRAZOLE SODIUM 40 MG: 40 TABLET, DELAYED RELEASE ORAL at 06:40

## 2021-03-04 RX ADMIN — PREDNISONE 5 MG: 5 TABLET ORAL at 08:09

## 2021-03-04 RX ADMIN — ACETYLCYSTEINE 2 ML: 100 INHALANT RESPIRATORY (INHALATION) at 09:01

## 2021-03-04 RX ADMIN — POSACONAZOLE 300 MG: 100 TABLET, COATED ORAL at 09:41

## 2021-03-04 RX ADMIN — METOPROLOL TARTRATE 25 MG: 25 TABLET, FILM COATED ORAL at 21:32

## 2021-03-04 RX ADMIN — INSULIN ASPART 1 UNITS: 100 INJECTION, SOLUTION INTRAVENOUS; SUBCUTANEOUS at 18:40

## 2021-03-04 RX ADMIN — ALBUTEROL SULFATE 2.5 MG: 2.5 SOLUTION RESPIRATORY (INHALATION) at 12:01

## 2021-03-04 RX ADMIN — PREDNISONE 2.5 MG: 2.5 TABLET ORAL at 21:32

## 2021-03-04 RX ADMIN — Medication 1 TABLET: at 11:21

## 2021-03-04 RX ADMIN — ALBUTEROL SULFATE 2.5 MG: 2.5 SOLUTION RESPIRATORY (INHALATION) at 21:13

## 2021-03-04 RX ADMIN — ALBUTEROL SULFATE 2.5 MG: 2.5 SOLUTION RESPIRATORY (INHALATION) at 18:09

## 2021-03-04 RX ADMIN — ACETYLCYSTEINE 2 ML: 100 INHALANT RESPIRATORY (INHALATION) at 21:13

## 2021-03-04 RX ADMIN — POSACONAZOLE 300 MG: 100 TABLET, COATED ORAL at 15:01

## 2021-03-04 RX ADMIN — ALBUTEROL SULFATE 2.5 MG: 2.5 SOLUTION RESPIRATORY (INHALATION) at 09:02

## 2021-03-04 RX ADMIN — BUDESONIDE 0.5 MG: 0.5 INHALANT RESPIRATORY (INHALATION) at 09:04

## 2021-03-04 RX ADMIN — POSACONAZOLE 300 MG: 100 TABLET, COATED ORAL at 21:32

## 2021-03-04 ASSESSMENT — MIFFLIN-ST. JEOR: SCORE: 1073.03

## 2021-03-04 NOTE — PROGRESS NOTES
Pulmonary Medicine  Cystic Fibrosis - Lung Transplant Team  Progress Note  2021       Patient: Kecia Blue  MRN: [id number]  : 1960 (age 60 year old)  Transplant: 3/1/2018 (Lung), POD#[id number]  Admission date: 2021    Assessment & Plan:     Kecia Blue is a 60 year old female with PMH of BSLT () for ILD with antisynthetase sydrome, postoperative course c/b right mainstem bronchial stenosis s/p several dilations, left-sided Aspergillus empyema () s/p amphotericin beads (2020), EBV viremia, CMV viremia, and ESRD on HD .  Patient was admitted to OSH on  for acute hypoxemic respiratory failure and new lung opacities. Intubated  and transferred to [facility] for ongoing management.  Extubated  but reintubated - for progressive hypoxemia.  Rapid decompensation 2/3 with ARDS presentation requiring reintubation, prone positioning, and inhaled epoprostenol, suspected d/t worsening PJP. S/p Trach , and PEG-J (). S/p dilation of right main by IP on . Discharged to ARU on  for ongoing rehabilitation. This is a phone visit secondary to the ongoing COVID-19 pandemic.      Recommendations:   - Tacrolimus level remains supra therapeutic, hold dose tonight and resume tacrolimus at 1 mg BID tomorrow  - Repeat posaconazole level tomorrow-we will f/u as OP and discuss with ID (ordered)  - Coordinator to manage labs next week as well as f/u appointments with transplant and ID prior to bronch early April  - Feeding tube will need to remain in place for 3 months     Acute hypoxemic respiratory failure:   Right post-obstructive Stenotrophomonas and Eikenella pneumonia:  PJP pneumonia:  Septic shock, Resolved:   Airway stenosis with distal plugging s/p right mainstem dilation ():  ARDS: Presented to OSH ED with increased SOB and hypoxia, intubated  with need for pressors, transferred to [facility].  PTA bronch (20) with moderate airway obstruction and  RML/RLL mucous plugging, cultures with Stenotrophomonas and Eikenella. OSH CT chest with new multifocal GGO bilaterally and increased left loculated pleural effusion. Repeat bronch (1/24) with RUL BAL with thick copious secretions to RML/RLL, PJP PCR positive. Extubated on 1/26, but reintubated from 1/27-1/29 for progressive hypoxemia. Eventually decompensated on 2/3 and again reintubated. Presentation consistent with ARDS (tx: prone positioning, inhaled epoprostenol, pressors).  CT chest (2/3) with progressive consolidation and possible superimposed infection findings, stable LLL chronic empyema.  Bronch on 2/11 with persistent right mainstem stenosis with moderate airway obstruction.  S/p trach 2/12 by IP, downsized to Shiley #6 (uncuffed) on 2/22. Bronch cultures (2/19) with Staph epidermidis (no indication to treat). S/p steroid burst with taper through 2/17 and 21 day Bactrim course for PJP through 2/15. TD exclusively 2/19, weaned off TD 2/24 and trach removed today. No new pulmonary complaints, did a walk test today and will discharge on 2L with activity.   - Nebs: albuterol QID, Mucomyst 10% 4 ml BID, and Pulmicort BID  - Repeat IP bronch in 6 weeks (~4/2)       S/p bilateral lung transplant for ILD 2/2 CTD: Last seen in pulmonary clinic 12/2. DSA (1/25) not detected.     Immunosuppression: On 2-drug IST d/t recurrent infections  - Tacrolimus 1.5/1.0. Goal level 8-10. Levels have been supra therapeutic despite holding of doses and decreasing overall dose and suspect this is related to her posaconazole level; will hold dose again tonight and decrease to 1 mg BID with plan to obtain steady state next week  - Chronic prednisone 5 mg qAM / 2.5 mg qPM      Prophylaxis:   - Bactrim qMWF for PJP ppx     Aspergillus empyema (left-sided): Noted 10/8/19, negative in November and again on admission. CT scan on 7/17/20 with increased mass-like density, likely pleural-based, in LLL area s/p needle aspiration (8/13/20)  "with Aspergillus fumigatus on cultures s/p intrapleural amphotericin bead placement (11/20). LFTs stable on admission (ALP chronically mildly elevated). CT chest with increased loculated left pleural effusion s/p thoracentesis (1/25), exudative with 87% neutrophils, very high LDL (6308), cytology normal, AFB pleural cultures NGTD-stain negative. Her level jumped from < 0.2 (2/25) to 5.7 (3/2) with no dose change, but only a change from suspension to oral tablets.   - Recheck a level tomorrow  - Messaged ID re: plan for dosing--change now vs wait for repeat level  - Posaconazole 300 mg TID   - F/u with ID as OP      H/o CMV viremia: CMV D+/R+. CMV (2/25) negative.  VGCV ppx with steroid burst, completed 2/23.  - CMV [id number] initially after completion of VGCV ppx course     EBV viremia: Level 12/9/20 mildly elevated to 10K (log 4) from prior 3K (3.5 log) on 9/9/20, repeat (1/25/21) decreased to 5619 copies (log of 3.8), but increased on 2/22/21 to 75,709 (log of 4.9). This increase is likely secondary to acute illness and recent steroid burst.   - Recheck EBV in 4 weeks (3/22)    Ame Chow PA-C  Pulmonary CF/Transplant  [id number]      Interval History:     Doing well, ready to go home tomorrow. Does still have some dyspnea with activity, notes she will be discharging on 2 L O2. Denies  fever or chills, no congestion or cough. Trach has been removed. No new GI complaints, is wondering about a Zio Patch and the covid vaccine.     Review of Systems:     Please see interval history, otherwise 10 point review is negative    Physical Exam:     Vital signs:  Temp: 97.4  F (36.3  C) Temp src: Oral BP: 118/64 Pulse: 97   Resp: 18 SpO2: 99 % O2 Device: Nasal cannula Oxygen Delivery: 1 LPM Height: 160 cm (5' 3\") Weight: 52.4 kg (115 lb 8 oz)  I/O:     Intake/Output Summary (Last 24 hours) at 2/25/2021 0715  Last data filed at 2/25/2021 0600  Gross per 24 hour   Intake 1490 ml   Output --   Net 1490 ml     No physical exam as this " was a phone visit    Lines, Drains, and Devices:  Peripheral IV 02/16/21 Right;Posterior Lower forearm (Active)   Site Assessment WDL 02/25/21 0400   Line Status Saline locked 02/25/21 0400   Phlebitis Scale 0-->no symptoms 02/25/21 0400   Infiltration Scale 0 02/25/21 0400   Infiltration Site Treatment Method  None 02/24/21 1200   Number of days: 9       Hemodialysis Vascular Access Arteriovenous fistula Left Arm (Active)   Site Assessment WDL;Bruit present;Thrill present 02/25/21 0400   Cannulation Needle Size 15 02/23/21 1700   Dressing Intervention New dressing 02/23/21 1700   Dressing Status Clean, dry, intact 02/25/21 0400   Verification by x-ray Not applicable 02/21/21 1600   Hand Off Report Yes 02/23/21 1700   Number of days: 31     Data:     LABS    CMP:   Recent Labs   Lab 03/04/21  0611 03/02/21  0750 03/01/21  1559 03/01/21  0637 02/27/21  0853 02/27/21  0853    128* 126* 128*   < >  --    POTASSIUM 4.5 4.2 5.5* 5.5*   < >  --    CHLORIDE 98 91* 92* 92*   < >  --    CO2 23 23 23 26   < >  --    ANIONGAP 12 14 11 10   < >  --    * 125* 117* 188*   < >  --    BUN 83* 108* 93* 82*   < >  --    CR 3.98* 4.25* 3.69* 3.45*   < >  --    GFRESTIMATED 12* 11* 13* 14*   < >  --    GFRESTBLACK 14* 12* 15* 16*   < >  --    CHELSEY 7.9* 8.4* 8.8 8.4*   < >  --    MAG 1.6  --   --  1.6  --   --    PHOS 6.1*  --   --  5.0*  --   --    PROTTOTAL  --   --   --  6.8  --  6.6*   ALBUMIN  --   --   --  2.8*  --  2.5*   BILITOTAL  --   --   --  0.7  --  0.4   ALKPHOS  --   --   --  339*  --  326*   AST  --   --   --  17  --  16   ALT  --   --   --  49  --  44    < > = values in this interval not displayed.     CBC:   Recent Labs   Lab 03/04/21  0611 03/01/21  0637 02/27/21  0853   WBC 7.5 5.6 4.2   RBC 2.89* 2.97* 3.09*   HGB 9.3* 9.5* 9.8*   HCT 29.6* 29.6* 30.6*   * 100 99   MCH 32.2 32.0 31.7   MCHC 31.4* 32.1 32.0   RDW 19.7* 19.4* 19.7*    311 268       INR:   No lab results found in last 7  days.    Glucose:   Recent Labs   Lab 03/04/21  0758 03/04/21  0611 03/04/21  0203 03/03/21  2108 03/03/21  1748 03/03/21  1244 03/03/21  0729 03/02/21  0750 03/02/21  0750 03/01/21  1559 03/01/21  1559 03/01/21  0637 03/01/21  0637 02/28/21  0557 02/28/21  0557 02/27/21  0628 02/27/21  0628   GLC  --  127*  --   --   --   --   --   --  125*  --  117*  --  188*  --  178*  --  218*   *  --  183* 212* 149* 100* 113*   < >  --    < >  --    < >  --    < >  --    < >  --     < > = values in this interval not displayed.       Blood Gas: No lab results found in last 7 days.    Culture Data   No results for input(s): CULT in the last 168 hours.    Virology Data:   Lab Results   Component Value Date    FLUAH1 Not Detected 01/24/2021    FLUAH3 Not Detected 01/24/2021    ZA1314 Not Detected 01/24/2021    IFLUB Not Detected 01/24/2021    RSVA Not Detected 01/24/2021    RSVB Not Detected 01/24/2021    PIV1 Not Detected 01/24/2021    PIV2 Not Detected 01/24/2021    PIV3 Not Detected 01/24/2021    HMPV Not Detected 01/24/2021    HRVS Negative 01/24/2021    ADVBE Negative 01/24/2021    ADVC Negative 01/24/2021    ADVC Negative 12/23/2020    ADVC Negative 10/07/2019       Historical CMV results (last 3 of prior testing):  Lab Results   Component Value Date    CMVQNT CMV DNA Not Detected 02/25/2021    CMVQNT <137 (A) 01/25/2021    CMVQNT 238 (A) 01/24/2021     Lab Results   Component Value Date    CMVLOG Not Calculated 02/25/2021    CMVLOG <2.1 01/25/2021    CMVLOG 2.4 (H) 01/24/2021       Urine Studies    Recent Labs   Lab Test 01/24/21  1729 10/21/19  2240   URINEPH 5.0 5.0   NITRITE Negative Negative   LEUKEST Moderate* Large*   WBCU 34* 115*       Most Recent [place]ze Pulmonary Function Testing (FVC/FEV1 only)  FVC-Pre   Date Value Ref Range Status   12/09/2020 1.12 L    09/09/2020 1.56 L    03/09/2020 1.57 L    02/19/2020 1.78 L      FVC-%Pred-Pre   Date Value Ref Range Status   12/09/2020 34 %    09/09/2020 47 %     03/09/2020 48 %    02/19/2020 54 %      FEV1-Pre   Date Value Ref Range Status   12/09/2020 0.98 L    09/09/2020 1.10 L    03/09/2020 0.96 L    02/19/2020 0.99 L      FEV1-%Pred-Pre   Date Value Ref Range Status   12/09/2020 37 %    09/09/2020 42 %    03/09/2020 37 %    02/19/2020 38 %        IMAGING    Recent Results (from the past 48 hour(s))   XR Video Swallow with SLP or OT    Narrative    Examination:  Modified Barium Swallow Study with Speech Pathology,  performed 2/24/2021    Comparison: None.    History: Recent tracheostomy to 2/12/2021    Fluoroscopy time: 1.7 minutes.    Findings: Under fluoroscopic guidance, the patient was given orally  administered barium of varying consistencies in the presence of the  speech pathology service.  The oral phase was normal. Consistent delay  of swallow initiating at the piriform. There is flash penetration  without aspiration of thin liquids. No evidence for aspiration with  nectar thickened barium, pudding consistency, or barium coated  cracker. Mild pharyngeal residue.  Other findings: Degenerative changes of the cervical spine.      Impression    Impression:   1. Flash penetration without aspiration of thin liquids.   2. Consistent delay of swallow initiation.    Please see the speech pathologist report for further details regarding  the modified barium swallow study portion of the examination.    I, SUNITA ADKINS MD, attest that I was present in the procedure room  for the entire procedure.    I have personally reviewed the examination and initial interpretation  and I agree with the findings.    SUNITA ADKINS MD

## 2021-03-04 NOTE — CARE CONFERENCE
Acute Rehab Care Conference/Team Rounds      Type: Team Rounds    Present: Dr. Shree Coffey, Lissett Rodriguez PA, Ozzy Fowler RN, Eder Martinez PT, Kaykay Franks OT, Erin Walters SLP, Juana Booker [facility], Ingris Leyva , Angeli Fuller Dietician, Antoinette Rucker Resident Kecia bruce Patient      Discharge Barriers/Treatment/Education    Rehab Diagnosis: debility post prolonged medical course     Active Medical Co-morbidities/Prognosis:   Patient Active Problem List   Diagnosis Code     ILD (interstitial lung disease) (H) J84.9     S/P lung transplant (H) Z94.2     Steroid-induced hyperglycemia R73.9, T38.0X5A     Deep vein thrombosis (DVT) (H) [I82.409] I82.409     Encounter for long-term (current) use of high-risk medication Z79.899     Dehydration E86.0     Acute kidney injury (H) N17.9     Cytomegalovirus (CMV) viremia (H) B25.9     Nausea and vomiting R11.2     Low hemoglobin D64.9     Cholecystitis K81.9     Empyema of lung (H) J86.9     Administration of long-term prophylactic antibiotics Z79.2     RADHA (acute kidney injury) (H) N17.9     Shortness of breath R06.02     Pulmonary hypertension (H) I27.20     Hemodialysis patient (H) Z99.2     Respiratory failure (H) J96.90     End stage renal failure on dialysis (H) N18.6, Z99.2     Bronchial stenosis, right J98.09     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 MOD I in the room with the walker. Will call appropriately when needing help. Pt on Hemodialysis.    Pain: Denied.     Medications, Skin, Tubes/Lines: Takes medications whole with water. On oxygen at 1LPM via nasal cannula. Has a Tracheostomy, capped yesterday and on for 24 hours and might be de-cannulated today if patient tolerates. O2 Sats between 95-99%, with continuous pulse oximeter in place at all times. However would de-sat to 89% with exertion such as when pt boosts self up in bed. O2 Sat would  jump right back to 96% after a few seconds. Hemodialysis fistula on the left arm with CDI dressing.     Swallowing/Nutrition: tolerates a regular diet with thin liquids with independent use of strategies- swallow goal met    Bowel/Bladder: Continent of both bowel and bladder. LBM 3/3. Pt will call for staff assistance in managing oxygen and pulse oxi tubings when ambulating to the bathroom.     Psychosocial: House in [place], [place] with spouse Ranjan. Home has ramp enterance and patient is able to stay on main level. Patient with frequent/recurrent illness since transplant with limited activity and limited activity tolerance. Unable to navigate stairs (says she could do this with difficulty but could typically avoid stairs in her home), ambulates ~ 50 feet prior to noting fatigue. States she was independent with all ADLs and was cooking and driving (even driving self to HD) prior to this hospitalization.  assist with all ADLs.  to , Ranjan. 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 Has 3 daughters, Nasreen, Yudelka and Evelyn (and 5 grandchildren) all whom live within 20 miles.     ADLs/IADLs: Pt has made good progress with ADLs/IADLs. Pt is Mod IND transfers and mobility with 4ww, needs 1L O2 NC with activity. Pt is Mod IND ADLs, requiring assistance for heavier IADLs. Pt limited by fatigue, deconditioning, and weakness. Pt has been educated in EC strategies and breathing techniques. Family providing assistance with heavier IADL at baseline. Pt on track to discharge home 3/5 with OP therapies. Equipment: pt has a walker and shower chair at home.     Mobility:   Goals nearing to met for mod I functional mobility with 4WW- assist needed to help manage O2 line/tank. Plan to reassess car transfer, initiate standing HEP, and perform walk test for assess for need for supplemental O2 at time of discharge, planned for 3/5.    Cognition/Language: Pt is now havign trach capping- plan was to  toelrate 24 hours yesterday and pt had been tolerating- possible decannulation today if maintained. No further sptx is indicated at this time if decannulated and no further sptx will be needed following discharge    Community Re-Entry: Supervision with 4WW, supplemental O2 management.    Transportation:  to provide. Mod I car transfer with 4WW.    Decision maker: self    Plan of Care and goals reviewed and updated.    Discharge Plan/Recommendations    Fall Precautions: continue    Patient/Family input to goals: yes     Estimated length of stay: 10 days     Overall plan for the patient: reach a level of mod to I with ADLS and iADLS      Utilization Review and Continued Stay Justification    Medical Necessity Criteria:    For any criteria that is not met, please document reason and plan for discharge, transfer, or modification of plan of care to address.    Requires intensive rehabilitation program to treat functional deficits?: Yes    Requires 3x per week or greater involvement of rehabilitation physician to oversee rehabilitation program?: Yes    Requires rehabilitation nursing interventions?: Yes    Patient is making functional progress?: Yes    There is a potential for additional functional progress? Yes    Patient is participating in therapy 3 hours per day a minimum of 5 days per week or 15 hours per week in 7 day period?:Yes    Has discharge needs that require coordinated discharge planning approach?:Yes      Barriers/Concerns related to meeting medical necessity criteria:  none    Team Plan to Address Concern:  As needed       Final Physician Sign off    Statement of Approval:  Shree Ford, DO      Patient Goals  Social Work Goals: Home tomorrow, OP HD set up and confirmed, pt will have A from family and OP therapy. No additional SW needs.     OT Frequency: 60-90 Daily  OT goal: hygiene/grooming: independent  OT goal: upper body dressing: Independent  OT goal: lower body dressing:  Independent  OT goal: upper body bathing: Modified independent  OT goal: lower body bathing: Modified independent  OT goal: bed mobility: Independent  OT Goal: transfer: Modified independent  OT goal: toilet transfer/toileting: Modified independent, Independent  OT goal: meal preparation: Modified independent, with simple meal preparation  OT goal: home management: Modified independent, with light demand household tasks  OT goal: perform aerobic activity with stable cardiovascular response: continuous activity, 5 minutes  OT goal 1: Pt will demo IND with UE HEP with focus on UE strength for ADL/IADL performance.  OT goal 2: Pt will demo safety during Mod I shower transfer using fww onto shower chair.     PT Frequency: Daily   PT goal: transfers: Modified independent, Sit to/from stand, Bed to/from chair(with 4WW)  PT goal: gait: Greater than 200 feet, Modified independent(with 4WW)  PT goal: stairs: Modified independent, Rail on both sides(12 stairs)  PT goal: perform aerobic activity with stable cardiovascular response: continuous activity, NuStep, 15 minutes  PT goal 1: Pt will be independent with standing LE strength and balance HEP prior to discharge.  PT Goal 2: Pt will be able to perform car transfer mod I with 4WW in order to safely discharge home and to f/u medical appts.    SLP Frequency: 2x/week  SLP Swallow Goal:  Safely tolerate diet without signs/symptoms of aspiration: regular diet, thin liquids, with use of swallow precautions, independently Goal met   SLP goal 1: Pt will continue to be independent with care and donning/doffing PMSV and will continue to wear throughout the day as tolerated- currently checkins 1-2 times per week- can increase more if trach capping is initiated. Goal met- now trach capping with plan to decannulate likely today     RN goals:  RN goal 1. Nutrition/Feeding/Swallowing Precautions: Pt/ family will learn how to manage Peg tube site care and flush by 3/4/21 -goal met.  Education completed on 3/3/21  RN goal 2. Mobility: patient will be advanced to ad diego or MOD I by time of discharge by participating in all therapies to gain strength throughout her stay at ARU. - goal met.  RN fallon 3. Skin Integrity: Pt will be knowledgeable how to prevent pressure ulcer through nursing education

## 2021-03-04 NOTE — PLAN OF CARE
Supplemental O2 walk test completed:  At rest (SpO2):  100% on 1 L/min  >= 95% on RA    With ambulation x175 ft with 4WW:  On 1 L/min, SpO2 drops as low as 78% during ambulation, returning to 90% within 15 sec of seated rest, >= 95% within 5 additional seconds.    Recommend use of supplemental O2 for activity at home.

## 2021-03-04 NOTE — PLAN OF CARE
Occupational Therapy Discharge Summary    Reason for therapy discharge:    All goals and outcomes met, no further needs identified.    Progress towards therapy goal(s). See goals on Care Plan in [facility] electronic health record for goal details.  Goals met     Therapy recommendation(s):    Not further OT intervention recommended. Pt will complete OP PT for ongoing strengthening and endurance. Referral sent to [facility] OP facility.     Summary: Kecia Blue is a 60 year old woman with past medical history of lung transplant 2018. Presented to hospital with acute hypoxic respiratory failure in context of pneumonia. Admitted to IP rehab 2/25.    ADL/IADL: Mod I TB dressing while seated EOB, Mod I toilet transfer onto commode overlay using commode arm rests for STS, Min A STS from standard toilet seat, SBA during STS from higher surfaces, IND oral cares and g/h while standing EOS with 4ww, Mod I bathing seated on shower bench using hand held shower head, shower transfer Mod I using 4ww. Family providing assistance with heavier IADL at baseline. Pt continues 1L O2 through nasal canula during activity at this time, RA for short distance and light activity.     Family support: Family support at home, spouse available as needed.     Equipment: Pt has all recommended equipment at home including walker, and shower chair.     HEP: UB resistance band HEP to target UB strength and endurance to increase IND with ADL.

## 2021-03-04 NOTE — PLAN OF CARE
FOCUS/GOAL  Bowel management, Bladder management, Pain management, Mobility, Skin integrity, and Safety management    ASSESSMENT, INTERVENTIONS AND CONTINUING PLAN FOR GOAL:  A/O, VS stable. Ate most of meal. Continent of bowel and bladder, last BM yesterday. No complaints of pain this shift. Mod I with walker for transfers. No current skin concerns. Calls appropriately with light. Continue POC.

## 2021-03-04 NOTE — PROGRESS NOTES
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 She was admitted to [facility] 1/4-2/25/21 for acute hypoxic respiratory failure found to have PJP pneumonia w/ stay c/b ARDS requiring tracheostomy, PEG/J, and dilation of right main bronchus as well as a fib w/ RVR. Transferred to ARU for ongoing rehabilitation. IM following d/t transplant hx.      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 On 2/19/21 had right mainstem bronchus dilation. Recent supratherapeutic Tacrolimus levels, last 14.7 on 3/4. Goal 8-10.  - Hold Tacrolimus tonight. Resume at 1 mg BID tomorrow. Monitor daily levels.  - Prednisone 5 mg AM/2.5 mg PM (no further taper planned at this time).   - Continue prophylaxis w/ Bactrim Q MWF.   - Transplant coordinator to arrange labs next week and follow up with transplant + ID prior to bronch  - Repeat bronch ~4/2.      #Acute Hypoxemic Respiratory Failure s/p Tracheostomy  #PJP Pneumonia  Presented in respiratory failure and ultimately failed extubation trials x2 and developed ARDS. Required proning, inhaled epoprostenol, pressors. Tracheostomy done 2/12 and exchanged for #6 cuffless Shiley on 2/22. Completed steroid taper 2/17 and 21 d course of Bactrim 2/15. O2 sats stable on 1L NC. Trach decannulated 3/4.  - Wean supplemental O2 as able.   - Continue QID Albuterol, BID Mucomyst, BID Pulmicort nebs.      #History of Left Sided Aspergillus Empyema  - Diagnosed 10/2019 s/p amphotericin beads and is on indefinite posaconazole. Level increased from <0.2 on 2/25 to 5.7 on 3/2 with changing from suspension to PO tablets (no dose change).  - Recheck Posaconazole level 3/5  - Continue Posaconazole 300 mg PO TID for now.   - Pulmonology messaged ID re: plan for dosing with most recent level.   - Outpatient ID follow up    #ESRD - Currently on HD Tu/Th/Sat (was biweekly prior to hospitalization) via AVF w/ partial graft that can be challenging to access. Had CRRT 2/5-2/8 d/t hemodynamic instability, otherwise has tolerated iHD (temp line removed 2/9). Hyperkalemic 2/27 and 3/1 in setting of ERSD, supratherapeutic Tacro level, Bactrim, and fistula clotting. S/p IR fistulogram on 3/2.  - Nephrology following.   - Planning for next HD on 3/5 prior to discharge   - Has required occasional midodrine during HD.      #History of CMV and EBV Viremias - Last CMV PCR 2/25 undetected. EBV level 2/22/21 increased to 75,709 (log of 4.9) from 5K (log 3.8) on 1/25/21. This increase is likely secondary to acute illness and recent steroid burst.   - CMV PCR q 2 weeks (next 3/11) since recently stopping VGCV prophylactic course   - Next EBV PCR due 3/22.     #PAF - In setting of lung disease and critical illness. Short self limited runs in the hospital. Echo 1/25/21 w/ normal LVEF and RV size/function, high RA pressure. CHADSVasc 1 with no need for long term oral anticoagulation (per 2/22 EP note). Despite this, was on Eliquis upon arrival to ARU which was discontinued after discussion with patient and pulmonology. Her only DVT was line associated after transplant in 2018, not on AC prior to hospitalization. HR variable, typically in 90s-100s.  - Continue Metoprolol 25 mg BID (reduced from 50 mg BID d/t borderline BPs).   - Needs OP Ziopatch arranged at ARU discharge.      #Steroid and TF Induced Hyperglycemia - A1c 6% on 1/24/21. Steroid taper completed 2/17. On TFs through 3/2. Now  taking PO. Lantus discontinued with stopping of TF. BG controlled since with miminal sliding scale requirements (1-2 units/day).  - High Novolog sliding scale AC/HS.   - Recommend discharging with diabetes supplies and sliding scale.     #Non-Severe Malnutrition - In setting of chronic illness. TF initiated during hospitalization d/t intubation. PEG-J placed 2/16 by IR. Now meeting caloric goals with PO intake alone. TF discontinued 3/2.  - Regular diet (rather than renal to promote PO intake) + PO supplements  - PEG-J to remain in place for 3 months (remove ~5/16)    #Anxiety - Zyprexa discontinued 3/4 by PM&R. Monitor for increased symptoms.    MERRICK Tucker  Hospitalist Service  [facility]  Contact information available via [facility] Paging/Directory  ____________________________________________________________________    Interval History  Doing well today. Breathing comfortably. O2 stable on 1L. Tolerated trach capping x 24 hours. Cough improving. Stools formed. Producing urine, no dysuria. Mild nausea yesterday after eating chicken salad; since resolved. Denies chest pain, SOB, vomiting, or abdominal pain.    Data reviewed today: I reviewed all medications, new labs and imaging results over the last 24 hours.    Physical Exam  Vital Signs: Temp: 97.4  F (36.3  C) Temp src: Oral BP: 118/64 Pulse: 101   Resp: 18 SpO2: 97 % O2 Device: None (Room air) Oxygen Delivery: 1 LPM  Weight: 115 lbs 8 oz  General: Awake. Sitting up in chair. NAD.  at bedside.  HEENT: Anicteric sclera. MMM. Trach cap in place.  CV: RRR  Respiratory: Normal effort on 1L via NC. Coarse throughout.  GI: Abdomen is soft, non-tender, and non-distended. Bowel sounds present. GJ tube in place.  Extremities: No peripheral edema. Warm and well perfused.  Skin: No rashes or jaundice on exposed areas.

## 2021-03-04 NOTE — DISCHARGE SUMMARY
[facility]   Acute Rehabilitation Unit  Discharge summary     Date of Admission: 2/25/2021  Date of Discharge: 03/05/2021  Disposition: home with outpatient therapy  Primary Care Physician: Rosy Vu  Attending physician: Shree Ford DO  Other significant provider(s): Lissett Rodriguez PA-C; Juana Hooks PA-C (medicine), Ame Chow PA-C (pulm transplant)      DISCHARGE DIAGNOSIS      Acute hypoxemic respiratory failure, improved    Sepsis in setting of PJP pneumonia    S/p tracheostomy placement 2/12 and decannulation 3/4    S/p bilateral lung transplant    Aspergillus empyema    ESRD on HD    Hyperkalemia, resolved    Acute on chronic anemia    Paroxysmal atrial fibrillation    Hyperglycemia    HTN    Malnutrition      BRIEF SUMMARY  Kecia Blue is a 60 year old female with past medical history of HTN, ESRD on dialysis, ILD s/p bilateral lung transplant 03/2018 with complicated postoperative course including: right mainstem bronchial stenosis aspergillus empyema, EBV viremia, CMV viremia.  Kecia was admitted to an outside hospital on 1/22 with acute hypoxemic respiratory failure and new lung opacities on imaging, was ultimately diagnosed with PJP pneumonia, she was intubated and transferred to [facility] on 1/24, course further complicated by ARDS on 2/3, prolonged hypoxic respiratory failure s/p tracheostomy on 2/12 and PEG-J on 2/16, underwent dilation of right main bronchus on 2/19. Admitted to acute rehab unit 2/25/21 for ongoing rehabilitation and medical management.      REHABILITATION COURSE  PT: Goals partially met.  Barriers to achieving goals:   management of supplemental O2 line/tank.  Pt issued HEP for LE strength, endurance, and balance training. She plans to continue with OP PT to further increase independence and tolerance for community mobility.    OT: Goals met.  No further OT intervention recommended. Pt will complete OP PT for  ongoing strengthening and endurance. Referral sent to [facility] OP facility.  ADL/IADL: Mod I TB dressing while seated EOB, Mod I toilet transfer onto commode overlay using commode arm rests for STS, Min A STS from standard toilet seat, SBA during STS from higher surfaces, IND oral cares and g/h while standing EOS with 4ww, Mod I bathing seated on shower bench using hand held shower head, shower transfer Mod I using 4ww. Family providing assistance with heavier IADL at baseline. Pt continues 1L O2 through nasal canula during activity at this time, RA for short distance and light activity. Family support: Family support at home, spouse available as needed.  Equipment: Pt has all recommended equipment at home including walker, and shower chair. HEP: UB resistance band HEP to target UB strength and endurance to increase IND with ADL.     SLP:  Patient decannulated 3/4.  Cognition WFL - intact.  Tolerating regular diet with thin liquids without difficulty.  Using safe swallow strategies independently.  Swallow goals met.  No further SLP intervention recommended at this time.    MEDICAL COURSE  Acute hypoxemic respiratory failure  Sepsis in setting of PJP pneumonia, possible secondary pneumonia  S/p tracheostomy placement (2/12/21)  Presented to outside hospital with acute hypoxemic respiratory failure secondary to PJP pneumonia. She failed extubation trials x2, developed ARDS.  Required proning, inhaled epoprostenol, pressors. Required tracheostomy: 2/12. Completed steroid course  2/17. Recieved Bactrim X 21 days. s/p  Bronchoscopy on 2/19 (cultures showed Staph epidermidis).  VFSS 2/24 and approved for regular diet by SLP.  At admission to ARU, using PMSV.  Hospitalist and pulmonary transplant team following throughout ARU stay.  Gradually weaning from supplemental oxygen.  Trach capping trial initiated on 3/1, with successful 24-hours capping and no desaturations on overnight oximetry, decannulated on 3/4.  Now  tolerating room air at rest, but still needing 1-2LPM with activity.  - Continue supplemental oxygen with humidified NC with activity, wean as able  - Continue Albuterol QID, Mucomyst BID, Pulmicort BID   - CXR weekly per pulm (next 3/8)  - F/u outpatient repeat bronchoscopy in 6 weeks (~4/2)  - Continue regular diet with thin liquids  - Trach stoma cares  - Follow up with pulm for trach site  - Continue OP PT     S/p bilateral lung transplant  03/2018 postoperatively complicated by right mainstem bronchial stenosis s/p several dilations, 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 EBV viremia  Immunosuppression: Tacrolimus level 3/4 supratherapeutic, per pulm, last night's dose held and resumed today at decreased dose of 1 mg BID.  Goal 8-10. Repeat levels per pulm transplant.  Prednisone 5 mg q am/2.5 mg po q PM (transitioned from burst steroids on 2-18-21)  Prophylaxis: Bactrim q M, W, F for PJP ppx  - Follow up with pulmonary transplant, medication doses, repeat labs/imaging per their recs  - CMV level q 2 weeks (VGCV for CMV ppx done through 2-25-21)  - EBV level q 4 weeks (due 3-22-21)  - Asymptomatic COVID screening (-) 3/4/21     Aspergillus Empyema   Admitted to ARU on posaconazole 300 mg TID, plan for indefinite course per ID. Levels increased from <0.2 on 2/25 to 5.7 on 3/2.    - Per pulm transplant/transplant ID pharmacist, plan to discharge on posaconazole 300 mg qday given recent high level.  - Transplant ID will contact patient Monday, plan for recheck labs 3/9, then virtual visit with transplant pulm on 3/10, so they can make appropriate adjustments to tacro and posaconazole doses at that time  - F/u ID as outpatient     ESRD  Hyperkalemia, resolved  Prior to hospitalization, HD biweekly (M/Th) per [facility] nephrology. Required CRRT 2/4 to 2/8.  Access is AVF with partial graft, challenging to access.  Continued with HD T/Th/Sat while in ARU.  Hyperkalemia to 6.2 on  2/27, kayexalate given and improved after HD.  On 3/1, recurrent hyperkalemia to 5.5  Attempted extra HD run, but unable to access fistula.  Returned to ARU and kayexalate given.  Now s/p fistulogram and declot by IR on 3/2, with successful HD run after.  K has been stable since, with most recent at 4.2 on 3/2.  Last HD on date of discharge (3/4).  - Continue HD on home schedule M/W/F  - Follow up with nephrology with HD   - Electrolyte monitoring per nephrology     Acute on Chronic Anemia- Hgb 9.9 2/25 receiving EPO with dialysis.  Hgb stable throughout rehab stay, most recently 9.3 on 3/4.    - Follow up with PCP, repeat CBC as indicated     Leukopenia, resolved  WBC 2.9 2/25 felt to be medication related.  Gradual improvement throughout ARU stay, most recently at 7.5 on 3/4.  - Follow up with PCP, repeat CBC as indicated     Atrial fibrillation with RVR    Occurring intermittently, seen and evaluated by EP   - Follow up with EP outpatient with zio patch after discharge from ARU  - Continue metoprolol, apixaban.      Steroid/ Tube feed induced Hyperglycemia:  Hgb A1C 6.0% 1/24/21.  On Lantus 10 units daily at ARU with sliding scale insulin.  Lantus held starting 3/2 with TF hold.  Some ongoing hyperglycemia despite off TF.  Remains on long-term steroids.  Recommend initial blood glucose monitoring and sliding scale insulin, although do not expect will need long-term.    - Continue sliding scale insulin TID with meals and at bedtime  - Monitor blood glucose TID with meals and at bedtime  - Follow up with PCP to discontinue insulin and monitoring as appropriate     Severe Malnutrition- in context of chronic illness.  s/p PEG-J tube 2/16/21.  Improving oral intake by admission to ARU.  TF held beginning 3/2.  RD followed throughout and noted improving intakes.  No TF recommended at discharge  - G-tube cares and flush with 30 mL BID at 0800 and 2000  - Continue renal diet  - Keep feeding tube in place x3 months per  pulm transplant  - Can follow up with nutrition at HD if recurrent concerns     Anxiety: Initially managed on olanzapine, hydroxyzine PRN.  Weaned off Zyprexa by discharge without additional anxiety or sleep concerns.     Hx anisocoria: no deficits on exam, negative CTH. Monitor.     HTN: Holding PTA meds (Coreg, amlodipine)  - Continue metoprolol  - Monitor BP and follow up with PCP/nephrology for ongoing management.    DISCHARGE MEDICATIONS  Current Discharge Medication List      START taking these medications    Details   !! blood glucose (NO BRAND SPECIFIED) test strip Use to test blood sugar 4 times daily or as directed.  Qty: 120 strip, Refills: 0    Comments: Patient needs the following supplies:  Tesfaye Microlet Lancets   B-D 4 mm barrington pen needles   Novolog Flexpen   Alcohol Wipes   Sharps Container  Associated Diagnoses: Hyperglycemia      !! blood glucose (NO BRAND SPECIFIED) test strip Use to test blood sugar 4 times daily or as directed.  Qty: 120 strip, Refills: 0    Comments: Patient needs the following supplies:  Tesfaye Microlet Lancets   B-D 4 mm barrington pen needles   Novolog Flexpen   Alcohol Wipes   Sharps Container  Associated Diagnoses: Hyperglycemia      Blood Glucose Monitoring Suppl (CONTOUR BLOOD GLUCOSE SYSTEM) w/Device KIT Use to check blood sugar 4 times daily before meals and at bedtime  Qty: 1 kit, Refills: 0    Comments: Tesfaye Contour Next Glucose Meter   Tesfaye Contour test strips   Patient needs the following supplies:  Tesfaye Microlet Lancets   B-D 4 mm barrington pen needles   Novolog Flexpen   Alcohol Wipes   Sharps Container  Associated Diagnoses: Hyperglycemia      insulin pen needle (32G X 4 MM) 32G X 4 MM miscellaneous Use 4 pen needles daily or as directed.  Qty: 120 each, Refills: 0    Comments: Patient needs the following supplies:  Tesfaye Microlet Lancets   B-D 4 mm barrington pen needles   Novolog Flexpen   Alcohol Wipes   Sharps Container  Associated Diagnoses: Hyperglycemia      ondansetron  (ZOFRAN-ODT) 4 MG ODT tab Take 1 tablet (4 mg) by mouth every 6 hours as needed for nausea or vomiting  Qty: 12 tablet, Refills: 0    Associated Diagnoses: Nausea and vomiting, intractability of vomiting not specified, unspecified vomiting type      pantoprazole (PROTONIX) 40 MG EC tablet Take 1 tablet (40 mg) by mouth every morning (before breakfast)  Qty: 30 tablet, Refills: 0    Associated Diagnoses: Gastroesophageal reflux disease without esophagitis      sulfamethoxazole-trimethoprim (BACTRIM) 400-80 MG tablet 1 tablet by Oral or Feeding Tube route three times a week On Mon, Wed, Fri  Qty: 12 tablet, Refills: 0    Associated Diagnoses: S/P lung transplant (H); 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 - Potential duplicate medications found. Please discuss with provider.      CONTINUE these medications which have CHANGED    Details   acetylcysteine (MUCOMYST) 10 % nebulizer solution Inhale 2 mLs into the lungs 2 times daily  Qty: 120 mL, Refills: 0    Associated Diagnoses: S/P lung transplant (H)      albuterol (PROVENTIL) (2.5 MG/3ML) 0.083% neb solution Take 1 vial (2.5 mg) by nebulization 4 times daily  Qty: 360 mL, Refills: 0    Associated Diagnoses: S/P lung transplant (H)      budesonide (PULMICORT) 0.5 MG/2ML neb solution Take 2 mLs (0.5 mg) by nebulization 2 times daily  Qty: 60 ampule, Refills: 0    Associated Diagnoses: S/P lung transplant (H)      !! insulin aspart (NOVOLOG PEN) 100 UNIT/ML pen Inject 1-10 Units Subcutaneous 3 times daily (before meals) for 10 days  Qty: 3 mL, Refills: 0    Associated Diagnoses: Hyperglycemia      !! insulin aspart (NOVOLOG PEN) 100 UNIT/ML pen Inject 1-7 Units Subcutaneous At Bedtime  Qty: 3 mL, Refills: 0    Associated Diagnoses:  Hyperglycemia      metoprolol tartrate (LOPRESSOR) 25 MG tablet 1 tablet (25 mg) by Oral or Feeding Tube route 2 times daily  Qty: 60 tablet, Refills: 0    Associated Diagnoses: Benign essential hypertension      multivitamin RENAL (RENAVITE RX/NEPHROVITE) 1 MG tablet Take 1 tablet by mouth daily  Qty: 30 tablet, Refills: 0    Associated Diagnoses: End stage renal failure on dialysis (H)      posaconazole (NOXAFIL) 100 MG EC tablet Take 3 tablets (300 mg) by mouth daily  Qty: 270 tablet, Refills: 0    Associated Diagnoses: S/P lung transplant (H)      !! predniSONE (DELTASONE) 2.5 MG tablet Take 1 tablet (2.5 mg) by mouth every evening  Qty: 30 tablet, Refills: 0    Associated Diagnoses: S/P lung transplant (H)      !! predniSONE (DELTASONE) 5 MG tablet Take 1 tablet (5 mg) by mouth every morning  Qty: 30 tablet, Refills: 0    Associated Diagnoses: S/P lung transplant (H)       !! - Potential duplicate medications found. Please discuss with provider.      CONTINUE these medications which have NOT CHANGED    Details   protein modular (PROSOURCE TF) LIQD 1 packet by Per Feeding Tube route daily  Qty: 30 packet, Refills: 0    Associated Diagnoses: S/P lung transplant (H)      acetaminophen (TYLENOL) 325 MG tablet Take 1 tablet (325 mg) by mouth every 4 hours as needed for mild pain or fever  Qty: 60 tablet    Associated Diagnoses: S/P lung transplant (H)      fluticasone (FLONASE) 50 MCG/ACT nasal spray Spray 1 spray into both nostrils daily  Qty: 15.8 mL, Refills: 0    Associated Diagnoses: S/P lung transplant (H)      melatonin 10 MG TABS tablet 1 tablet (10 mg) by Oral or Feeding Tube route nightly as needed for sleep  Qty: 30 tablet, Refills: 0    Associated Diagnoses: S/P lung transplant (H)         STOP taking these medications       Sharps Container MISC Comments:   Reason for Stopping:         apixaban ANTICOAGULANT (ELIQUIS) 2.5 MG tablet Comments:   Reason for Stopping:         Guar Gum (FIBER MODULAR,  NUTRISOURCE FIBER,) packet Comments:   Reason for Stopping:         insulin glargine (LANTUS PEN) 100 UNIT/ML pen Comments:   Reason for Stopping:         loperamide (IMODIUM) 1 MG/7.5ML liquid Comments:   Reason for Stopping:         midodrine (PROAMATINE) 10 MG tablet Comments:   Reason for Stopping:         OLANZapine (ZYPREXA) 10 MG tablet Comments:   Reason for Stopping:         pantoprazole (PROTONIX) 2 mg/mL SUSP suspension Comments:   Reason for Stopping:         senna-docusate (SENOKOT-S/PERICOLACE) 8.6-50 MG tablet Comments:   Reason for Stopping:         sulfamethoxazole-trimethoprim (BACTRIM/SEPTRA) 8 mg/mL suspension Comments:   Reason for Stopping:         tacrolimus (GENERIC EQUIVALENT) 1 mg/mL suspension Comments:   Reason for Stopping:         tacrolimus (GENERIC EQUIVALENT) 1 mg/mL suspension Comments:   Reason for Stopping:                 DISCHARGE INSTRUCTIONS AND FOLLOW UP  Discharge Procedure Orders   ALCOHOL WIPES PER BOX     INFECTIOUS DISEASE REFERRAL   Referral Priority: Routine Referral Type: Consultation   Number of Visits Requested: 1     Physical Therapy Referral   Standing Status: Future   Referral Priority: Routine Referral Type: Rehab Therapy Physical Therapy   Number of Visits Requested: 1     Occupational Therapy Referral   Standing Status: Future   Referral Priority: Routine Referral Type: Occupational Therapy   Number of Visits Requested: 1     Reason for your hospital stay   Order Comments: You were admitted for rehabilitation after your hospitalization for pneumonia/respiratory failure.     Adult [facility] Follow-up and recommended labs and tests   Order Comments: Follow up with primary care provider, Rosy Vu, within 7 days for hospital follow- up.  The following labs/tests are recommended: CBC, BMP.      Follow up with pulmonary transplant, labs and imaging per their recommendations.    Follow up with EP cardiology with Zio patch prior to visit.    Follow up with  ID for Posaconazole management for aspergillus empyema and history of CMV and EBV viremia.    Follow up with Pulmonology for repeat bronch (4-2-21), trach follow-up    Follow up with Nephrology for continued dialysis.    Appointments on [place] and/or [facility] (with [facility] or [facility] provider or service). Call [phone] if you haven't heard regarding these appointments within 7 days of discharge.     Activity   Order Comments: Your activity upon discharge: activity as tolerated and as directed by therapies.  Use walker for ambulation.  Supplemental oxygen by nasal cannula for activity, titrate as indicated.     Order Specific Question Answer Comments   Is discharge order? Yes      Monitor and record   Order Comments: blood glucose 4 times a day, before meals and at bedtime  blood pressure daily     Wound care and dressings   Order Comments: Instructions to care for your wound at home:   Trach stoma: daily dressing changes and keep wound clean and dry.  Continue with dressings until stoma closed and/or no additional drainage.     Tubes and drains   Order Comments: You are going home with the following tubes or drains: feeding tube GJ-Tube.   Follow these instructions to care for your tube.  Keep clean and dry.  Apply split gauze between tube and skin, change dressing daily.  Flush twice daily with 30 mL for patency (0800, 2000).  Can follow-up with IR to consider removal after tube has been in place x3 months.     Discharge Instructions   Order Comments: Instructions for sliding scale insulin, to be administered according to blood glucose levels 3 times a day before meals and at bedtime.    Correction Scale - HIGH INSULIN RESISTANCE DOSING     Do Not give Correction Insulin if Pre-Meal BG less than 140.   For Pre-Meal  - 164 give 1 unit.   For Pre-Meal  - 189 give 2 units.   For Pre-Meal  - 214 give 3 units.   For Pre-Meal  - 239 give 4 units.   For Pre-Meal  - 264 give 5 units.    For Pre-Meal  - 289 give 6 units.   For Pre-Meal  - 314 give 7 units.   For Pre-Meal  - 339 give 8 units.   For Pre-Meal  - 364 give 9 units.   For Pre-Meal BG greater than or equal to 365 give 10 units  To be given with prandial insulin, and based on pre-meal blood glucose.   Notify provider if glucose greater than or equal to 350 mg/dL after administration of correction dose.  If given at mealtime, administer within 30 minutes of start of meal     Flutter Valve   Order Comments: Continue to use flutter valve 4 times a day or as directed by pulmonary transplant team     Incentive Spirometry   Order Comments: Continue to use incentive spirometer 4 times a day Until return to normal activity     Full Code     Order Specific Question Answer Comments   Code status determined by: Discussion with patient/ legal decision maker      Oxygen Adult/Peds   Order Comments: Oxygen Documentation:   I certify that this patient, Kecia Blue has been under my care (or a nurse practitioner or physican's assistant working with me). This is the face-to-face encounter for oxygen medical necessity.      Kecia Blue is now in a chronic stable state and continues to require supplemental oxygen. Patient has continued oxygen desaturation due to Chronic Respiratory Failure with Hypoxia J93.11.    Alternative treatment(s) tried or considered and deemed clinically infective for treatment of Chronic Respiratory Failure with Hypoxia J93.11 include nebulizers, inhalers, steroids and pulmonary toileting.  If portability is ordered, is the patient mobile within the home? yes    **Patients who qualify for home O2 coverage under the CMS guidelines require ABG tests or O2 sat readings obtained closest to, but no earlier than 2 days prior to the discharge, as evidence of the need for home oxygen therapy. Testing must be performed while patient is in the chronic stable state. See notes for O2 sats.**     Order  Specific Question Answer Comments   DME Provider: [facility]    Type: Resume    Did the patient have SpO2 (sat) testing (only needed for new oxgyen or liter flow changes)? Yes    Length of Need: Lifetime    Frequency of Use: With Activity    Mode of Delivery - With Activity Nasal Cannula    Liter Flow - With Activity (LPM): 1-2    Need for Portability: Yes    Evaluate for Conserving Device: Yes    Maintain Sats >= 90%    The face to face evaluation was performed on: 3/4/2021      Diet   Order Comments: Follow this diet upon discharge:       Snacks/Supplements Adult: Boost Breeze; Between Meals      Regular Diet Adult     Order Specific Question Answer Comments   Is discharge order? Yes           PHYSICAL EXAMINATION    Most recent Vital Signs:   Vitals:    03/05/21 1115 03/05/21 1130 03/05/21 1145 03/05/21 1200   BP: 122/74 123/80 (!) 132/91 (!) 132/91   BP Location:       Pulse: 85 105 91 90   Resp:       Temp:   97.9  F (36.6  C)    TempSrc:   Oral    SpO2: 99% 98% 99% 99%   Weight:       Height:         Gen: awake, in NAD, seated comfortably at edge of bed  HEENT: NCAT, EOMI, MMM, trach stoma with dressing CDI  Cardio: RRR, no murmurs  Pulm: non-labored on room air, lungs CTA bilaterally  Abd: soft, non-tender, bowel sounds (+)  Extr: no peripheral edema  Neuro/MSK: AAOx3, responds appropriately, speech clear/fluent, strength 4-5 throughout    70 minutes spent in discharge, including >50% in counseling and coordination of care, medication review and plan of care recommended on follow up.     Patient was evaluated on day of discharge by attending physician, Shree Ford DO, who agrees with plan of care.    Discharge summary was forwarded to Rosy Vu (PCP) at the time of discharge, so as to bridge from hospital to outpatient care.     It was our pleasure to care for Kecia Blue during this hospitalization. Please do not hesitate to contact me should there be questions regarding the  hospital course or discharge plan.          Lissett Rodriguez PA-C  Physical Medicine and Rehabilitation

## 2021-03-04 NOTE — PLAN OF CARE
FOCUS/GOAL  Medical management, Discharge planning, and Skin integrity    ASSESSMENT, INTERVENTIONS AND CONTINUING PLAN FOR GOAL:  Patient is alert and oriented x 4 denies pain,shortness of breathing, and chest pain. VSS, O2 sats over 97% on O2 1L/NC. Trach de cannulated and dressing was applied by MD this morning. Attempting to wean off oxygen, O2 sats over 97% on RA at rest, tolerated well. Patient demonstrated G/J tube water flush. Appetite was good, continent of B/B last BM today. BS was 112 before breakfast and 154 before lunch.  Patient will go home with BG check and sliding scale Insulin, demonstrated insulin self administeration. Patient has HD tomorrow @ 7:30 am and diabetic education scheduled @ 1:00pm.

## 2021-03-04 NOTE — PLAN OF CARE
Physical Therapy Discharge Summary    Reason for therapy discharge:    Pt has met goals for safe discharge home with . Assist required to help manage O2 line.    Progress towards therapy goal(s). See goals on Care Plan in [facility] electronic health record for goal details.  Goals partially met.  Barriers to achieving goals:   management of supplemental O2 line/tank.    Therapy recommendation(s):    Pt issued HEP for LE strength, endurance, and balance training. She plans to continue with OP PT to further increase independence and tolerance for community mobility.

## 2021-03-04 NOTE — PROGRESS NOTES
3-Day Calorie Count Assessment:   3/1: 1188 kcal and 47 g protein (2 meals, 1 supplements)  3/2: 739 kcal and 42 g protein (dinner meal only as pt was NPO for procedure then went to dialysis)  3/3: 1565 kcal and 90 g protein (3 meals)  3 day average PO intake = 1164 kcal (77% minimum energy needs) and 59 g protein (99% minimum protein needs)    MAR reviewed. Labs reviewed, BGL's reviewed, K+ and Na improved, phos remains elevated. Weight trends reviewed, pt up 6 lbs from ARU admission, likely multifactorial in the setting of fluid status changes/dialysis.     Per pt visit: RD reviewed intakes and recommendations with pt and , pt agreeing with plan of care, pt will continue to monitored by RD services at dialysis. Reviewed tube removal suggestions of about 8 weeks from placements (can likely come out at the beginning of April).     Plan/Recommendations:  As po intakes remain improved/pt meeting >75% of lower end estimated needs, RD will discontinue calorie count, TF order, and Nutrisource Fiber order. J-tube will remain in place at this time, so pt will only need to go home on patency water flushes (RD recommends 30 ml BID at 0800 and 2000).    Angeli Fuller RD, CNSC, LD  ARU RD pager: [phone]

## 2021-03-04 NOTE — PROGRESS NOTES
[facility]   Acute Rehabilitation Unit  Daily progress note    INTERVAL HISTORY  Kecia Blue was seen and examined this morning during team rounds.  She reports that she is feeling well overall.  She is eager for discharge home planned for tomorrow and denies any new concerns.  She has mild ongoing cough, but decreasing oxygen needs.  She felt that she tolerated trach capping well and overnight oximetry showed no desaturations while capped.  Returned later with staff physician who decannulated at bedside.  Procedure was well-tolerated.  Patient denies pain, dizziness/lightheadedness, bowel or bladder concerns.  She reports having slept well last night with decrease in Zyprexa dose, and is agreeable to discontinuing tonight. AM labs reviewed, noted to have stable anemia, elevated but improved Cr/BUN, hyperglycemia, hyperphosphatemia.    Discussed with hospitalist, who recommended ongoing wean from supplemental oxygen at rest.  Given some ongoing hyperglycemia despite holding TF, plan for home with blood glucose monitoring and sliding scale insulin at least initially.  Per pulmonary transplant, tacro level remains supratherapeutic, so will hold dose again tonight and resume with lower dose tomorrow.      Functionally, she continues to make good progress with therapies and is on track for discharge home tomorrow.  She was made mod I in room with walker yesterday. For full functional updates, see team rounds note from today.    ROS: 10 point ROS neg other than the symptoms noted above in the HPI.    MEDICATIONS    acetylcysteine  2 mL Inhalation BID     albuterol  2.5 mg Nebulization 4x Daily     budesonide  0.5 mg Nebulization BID     insulin aspart  1-10 Units Subcutaneous TID AC     insulin aspart  1-7 Units Subcutaneous At Bedtime     metoprolol tartrate  25 mg Oral or Feeding Tube BID     multivitamin RENAL  1 tablet Oral Daily     pantoprazole  40 mg Oral QAM AC      "posaconazole  300 mg Oral TID     predniSONE  2.5 mg Oral QPM     predniSONE  5 mg Oral Daily     [START ON 3/5/2021] sulfamethoxazole-trimethoprim  80 mg Oral or Feeding Tube Once per day on Mon Wed Fri     [START ON 3/5/2021] tacrolimus  1 mg Oral BID IS        acetaminophen, alteplase (tPA) 10 mg/250mL infusion, dextrose, glucose **OR** dextrose **OR** glucagon, fentaNYL, flumazenil, heparin (porcine), heparin, lidocaine 4%, lidocaine (buffered or not buffered), midazolam, naloxone **OR** naloxone **OR** naloxone **OR** naloxone, ondansetron, - MEDICATION INSTRUCTIONS -, prochlorperazine, sodium chloride (PF), sodium chloride (PF), - MEDICATION INSTRUCTIONS -     PHYSICAL EXAM  /64 (BP Location: Right arm)   Pulse 99   Temp 97.4  F (36.3  C) (Oral)   Resp 18   Ht 1.6 m (5' 3\")   Wt 52.4 kg (115 lb 8 oz)   LMP 06/07/2014 (Exact Date)   SpO2 97%   BMI 20.46 kg/m    Gen: awake, lying comfortably in bed, pleasant and cooperative, in NAD  HEENT: NCAT, anicteric sclera, EOMI, MMM, trach capped (later removed and stoma covered in occlusive dressing)  Cardio: RRR  Pulm: non-labored on 1L by NC, later comfortable on room air  Abd: soft, non-tender, non-distended, PEG/J present  Ext: no peripheral edema   Neuro/MSK: AAOx3, responds appropriately, speech clear/fluent, strength 4-5 throughout    LABS  CBC RESULTS:   Recent Labs   Lab Test 03/04/21  0611 03/01/21  0637 02/27/21  0853   WBC 7.5 5.6 4.2   RBC 2.89* 2.97* 3.09*   HGB 9.3* 9.5* 9.8*   HCT 29.6* 29.6* 30.6*   * 100 99   MCH 32.2 32.0 31.7   MCHC 31.4* 32.1 32.0   RDW 19.7* 19.4* 19.7*    311 268     Last Basic Metabolic Panel:  Recent Labs   Lab Test 03/04/21  0611 03/02/21  0750 03/01/21  1559    128* 126*   POTASSIUM 4.5 4.2 5.5*   CHLORIDE 98 91* 92*   CO2 23 23 23   ANIONGAP 12 14 11   * 125* 117*   BUN 83* 108* 93*   CR 3.98* 4.25* 3.69*   GFRESTIMATED 12* 11* 13*   CHELSEY 7.9* 8.4* 8.8       Rehabilitation - continue " comprehensive acute inpatient rehabilitation program with multidisciplinary approach including therapies, rehab nursing, and physiatry following. See interval history for updates.      ASSESSMENT AND PLAN  Kecia Blue is a 60 year old yo female with past medical history of HTN, ESRD on dialysis, ILD s/p bilateral lung transplant 03/2018 with complicated postoperative course including: right mainstem bronchial stenosis aspergillus empyema, EBV viremia, CMV viremia.  Kecia was admitted to an outside hospital on 1/22 with acute hypoxemic respiratory failure and new lung opacities on imaging, was ultimately diagnosed with PJP pneumonia, she was intubated and transferred to [facility] on 1/24, course further complicated by ARDS on 2/3, prolonged hypoxic respiratory failure s/p tracheostomy on 2/12 and PEG-J on 2/16, underwent dilation of right main bronchus on 2/19. Admitted to acute rehab unit 2/25/21 for ongoing rehabilitation and medical management.      Acute hypoxemic respiratory failure  Sepsis in setting of PJP pneumonia, possible secondary pneumonia  S/p tracheostomy placement (2/12/21)  Presented to outside hospital with acute hypoxemic respiratory failure secondary to PJP pneumonia. She failed extubation trials x2, developed ARDS.  Required proning, inhaled epoprostenol, pressors. required tracheostomy: 2/12. completed steroid course  2/17. recieved Bactrim X 21 days. s/p  Bronchoscopy on 2/19 (cultures showed Staph epidermidis)  and requiring only trach dome since.  VFSS 2/24 and approved for regular diet by SLP.  - Hospitalist following, appreciate assistance  - Per pulm, continue supplemental oxygen with humidified NC  - Continue Albuterol QID, Mucomyst BID, Pulmicort BID   - CXR weekly (next 3/8)  - F/u outpatient repeat bronchoscopy in 6 weeks (~4/2)  - Decannulated today at bedside.  Trach cares ordered.  - Currently tolerating room air at rest.  Walk test completed with PT, with 175' ambulation on 1LPM  O2, desat to 78% with return to 90% within 15 seconds of seated rest.  Plan for supplemental oxygen by nasal cannula for activity at home.  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 EBV viremia  Immunosuppression: Tacrolimus remains supratherapeutic, per pulm, hold tonight's dose then resume at decreased dose of 1 mg BID.  Goal 8-10.  Continue daily levels per pulm transplant.   Prednisone 5 mg q am/2.5 mg po q PM (transitioned from burst steroids on 2-18-21)  Prophylaxis: Bactrim q M, W, F for PJP ppx  - CMV level q 2 weeks (VGCV for CMV ppx done through 2-25-21)  - EBV level q 4 weeks (due 3-22-21)  - Appreciate hospitalist and pulmonary transplant assistance      Aspergillus Empyema   -Continue posaconazole 300 mg TID, plan for indefinite course per ID. Levels per pulm recs.  Levels increased from <0.2 on 2/25 to 5.7 on 3/2.  Per pulm, plan to repeat level tomorrow and follow-up with ID regarding potential dose changes.  - F/u ID as outpatient     ESRD  Hyperkalemia  Prior to hospitalization, HD biweekly (M/Th) per [facility] nephrology. Required CRRT 2/4 to 2/8.  Access is AVF with partial graft, challenging to access.   - Appreciate ongoing nephrology support.   - Continue HD T/Th/Sat  - Hyperkalemia 3/1 to 5.5.  Planed for extra HD run, however issues with access.  Returned to ARU without additional run, and given kayexalate x1.  - Now s/p fistulogram and declot by IR on 3/2, with successful HD run after  - HD tomorrow before discharge, then resume home M/W/F schedule     Acute on Chronic Anemia- Hgb 9.9 2/25 receiving EPO with dialysis  - Trend CBC  - Hgb stable at 9.3 as of 3/4     Leukopenia- WBC 2.9 2/25 felt to be medication related  - Monitor CBC  - WBC improved to 7.5 as of  3/4     Atrial fibrillation with RVR    Occurring intermittently, seen and evaluated by EP   - Follow up with EP outpatient with zio patch after discharge from ARU  - Continue metoprolol, apixaban.      Steroid/ Tube feed induced Hyperglycemia:  Hgb A1C 6.0% 1/24/21  - Lantus 10 units daily, held as of 3/2 as now holding TF.  Some ongoing hyperglycemia despite holding TF.  - Continue sliding scale insulin   - Hypoglycemia protocol      Severe Malnutrition- in context of chronic illness.  s/p PEG-J tube 2/16/21  - Per discussion with RD on 3/1, ok to begin holding TF given improving oral intake.  Delayed due to having already received Lantus, but held starting 3/2.  Per RD, intakes improving and TF discontinued.  Will discharge with only water flushes for patency (30 mL BID at 0800 and 2000)  - Appreciate nutrition consult  - Continue renal diet  - Keep feeding tube in place x3 months per pulm transplant     Anxiety: continue bedtime and as needed olanzapine, hydroxyzine prn  - Zyprexa decreased to 5 mg 3/3 and well-tolerated.  Will discontinue.       Hx anisocoria: no deficits on exam, negative CTH. Monitor.     HTN: holding pta meds (Coreg, amlodipine)  - Continue metoprolol  - Monitor BP      1. Adjustment to disability:  Clinical psychology to eval and treat as indicated  2. FEN: renal diet + TF  3. Bowel: continent, last BM 2/26  4. Bladder: continent, low urine output given ESRD, PVRs at admission: 1 so far at 0  5. DVT Prophylaxis: apixaban  6. GI Prophylaxis: PPI  7. Code: full, confirmed on admission  8. Disposition: home with family support  9. ELOS: 7-10 days  10. Follow up Appointments on Discharge: EP cardiology with Zio patch prior to discharge.  ID for Posaconazole management for aspergillus empyema and history of CMV and EBV viremia, Pulmonology for repeat bronch (4-2-21), Nephrology for continued dialysis, pulm for trach F/U      Patient seen and discussed with Dr. Shree Ford, PM&R Staff  Physician    MERRICK Farrar-C  Physical Medicine & Rehabilitation

## 2021-03-04 NOTE — DISCHARGE INSTRUCTIONS
Follow up Appointments    - Lab & X-Ray  You are scheduled for Labs on Wednesday, March 10th at 7:30 AM, followed by a Chest X-Ray at 8:00 AM.    Address  [facility]                          Lab - Floor 1                          [address] [place] [address]  Phone             [phone]    - Pulmonology for trach removal and lung transplant  You are scheduled to see Ame Chow PA-C on Wednesday, March 10th at 8:30 AM.    Address  [facility]                          Pulmonology - Floor 3                          [address] [place] [address]  Phone   [phone]    - Primary Care  You are scheduled to see Dr. Rosy Vu on Monday, March 15th at 9:40 AM. Please arrive at 9:25 AM and check in at Station 4.    Address  [facility]                            [address] [place] [place] [address]  Phone   [phone]  Fax                  [phone]    - Cardiology  You are scheduled to see Dr. Kristen Alvarado on Friday, April 2nd at 11:30 AM.    Address  [facility]                          Cardiology - Floor 3                          [address] [place] [address]  Phone   [phone]    - Hepatology  You are scheduled for a video appointment with Dr. Feng Denise on Tuesday, June 22nd at 11:00 AM. You will receive an e-mail with a link to the virtual appointment.    Address  [facility] - Hepatology  Phone   [phone]    - Follow up with ID for posaconazole management for aspergillus empyema and history of CMV and EBV viremia.  A referral has been placed with the clinic and they will follow up with you to schedule. If you do not hear from them in a few days, please call [phone].    Address  [facility]                           [facility] [place]                          Floor 3, [address]                          [address] [place] [address]  Phone   [phone]    Follow up with nephrology at hemodialysis    ------------------------------    Your Transplant Coordinator RN is Mikayla Latham: [phone]    Outpatient Dialysis:   [facility]   [address] [place], [place]  Phone: [phone], Fax: [phone]  *Spoke and coordinated with Juana garcia RN   Chair is YOJANA, first chair is Monday 03/08.  Monday-2:00pm, Wednesday & Friday-1:30pm

## 2021-03-05 ENCOUNTER — TELEPHONE (OUTPATIENT)
Dept: TRANSPLANT | Facility: CLINIC | Age: 61
End: 2021-03-05

## 2021-03-05 ENCOUNTER — PATIENT OUTREACH (OUTPATIENT)
Dept: CARE COORDINATION | Facility: CLINIC | Age: 61
End: 2021-03-05

## 2021-03-05 VITALS
HEART RATE: 90 BPM | HEIGHT: 63 IN | TEMPERATURE: 97.9 F | SYSTOLIC BLOOD PRESSURE: 132 MMHG | BODY MASS INDEX: 20.77 KG/M2 | DIASTOLIC BLOOD PRESSURE: 91 MMHG | OXYGEN SATURATION: 99 % | RESPIRATION RATE: 19 BRPM | WEIGHT: 117.2 LBS

## 2021-03-05 LAB
GLUCOSE BLDC GLUCOMTR-MCNC: 105 MG/DL (ref 70–99)
GLUCOSE BLDC GLUCOMTR-MCNC: 135 MG/DL (ref 70–99)

## 2021-03-05 PROCEDURE — 250N000012 HC RX MED GY IP 250 OP 636 PS 637: Performed by: PHYSICIAN ASSISTANT

## 2021-03-05 PROCEDURE — 250N000013 HC RX MED GY IP 250 OP 250 PS 637: Performed by: PHYSICAL MEDICINE & REHABILITATION

## 2021-03-05 PROCEDURE — 94640 AIRWAY INHALATION TREATMENT: CPT

## 2021-03-05 PROCEDURE — 258N000003 HC RX IP 258 OP 636: Performed by: CLINICAL NURSE SPECIALIST

## 2021-03-05 PROCEDURE — 90937 HEMODIALYSIS REPEATED EVAL: CPT

## 2021-03-05 PROCEDURE — 250N000009 HC RX 250: Performed by: PHYSICIAN ASSISTANT

## 2021-03-05 PROCEDURE — 999N001017 HC STATISTIC GLUCOSE BY METER IP

## 2021-03-05 PROCEDURE — 80187 DRUG ASSAY POSACONAZOLE: CPT | Performed by: PHYSICIAN ASSISTANT

## 2021-03-05 PROCEDURE — 250N000013 HC RX MED GY IP 250 OP 250 PS 637: Performed by: PHYSICIAN ASSISTANT

## 2021-03-05 PROCEDURE — 250N000011 HC RX IP 250 OP 636: Performed by: CLINICAL NURSE SPECIALIST

## 2021-03-05 PROCEDURE — 36415 COLL VENOUS BLD VENIPUNCTURE: CPT | Performed by: PHYSICIAN ASSISTANT

## 2021-03-05 PROCEDURE — 634N000001 HC RX 634: Performed by: CLINICAL NURSE SPECIALIST

## 2021-03-05 PROCEDURE — 250N000011 HC RX IP 250 OP 636: Performed by: RADIOLOGY

## 2021-03-05 PROCEDURE — 99239 HOSP IP/OBS DSCHRG MGMT >30: CPT | Performed by: PHYSICIAN ASSISTANT

## 2021-03-05 PROCEDURE — 999N000157 HC STATISTIC RCP TIME EA 10 MIN

## 2021-03-05 RX ORDER — LIDOCAINE HYDROCHLORIDE 10 MG/ML
INJECTION, SOLUTION EPIDURAL; INFILTRATION; INTRACAUDAL; PERINEURAL
Status: DISPENSED
Start: 2021-03-05 | End: 2021-03-05

## 2021-03-05 RX ORDER — HEPARIN SODIUM 1000 [USP'U]/ML
1000 INJECTION, SOLUTION INTRAVENOUS; SUBCUTANEOUS CONTINUOUS
Status: DISCONTINUED | OUTPATIENT
Start: 2021-03-05 | End: 2021-03-05

## 2021-03-05 RX ORDER — HEPARIN SODIUM 1000 [USP'U]/ML
1000 INJECTION, SOLUTION INTRAVENOUS; SUBCUTANEOUS
Status: COMPLETED | OUTPATIENT
Start: 2021-03-05 | End: 2021-03-05

## 2021-03-05 RX ORDER — POSACONAZOLE 100 MG/1
300 TABLET, DELAYED RELEASE ORAL DAILY
Qty: 270 TABLET | Refills: 0 | Status: SHIPPED | OUTPATIENT
Start: 2021-03-05 | End: 2021-07-07

## 2021-03-05 RX ADMIN — HEPARIN SODIUM 3000 UNITS: 1000 INJECTION INTRAVENOUS; SUBCUTANEOUS at 08:14

## 2021-03-05 RX ADMIN — SODIUM CHLORIDE 250 ML: 9 INJECTION, SOLUTION INTRAVENOUS at 08:05

## 2021-03-05 RX ADMIN — PANTOPRAZOLE SODIUM 40 MG: 40 TABLET, DELAYED RELEASE ORAL at 06:27

## 2021-03-05 RX ADMIN — POSACONAZOLE 300 MG: 100 TABLET, COATED ORAL at 07:23

## 2021-03-05 RX ADMIN — EPOETIN ALFA-EPBX 4000 UNITS: 10000 INJECTION, SOLUTION INTRAVENOUS; SUBCUTANEOUS at 11:52

## 2021-03-05 RX ADMIN — METOPROLOL TARTRATE 25 MG: 25 TABLET, FILM COATED ORAL at 06:26

## 2021-03-05 RX ADMIN — ALBUTEROL SULFATE 2.5 MG: 2.5 SOLUTION RESPIRATORY (INHALATION) at 14:28

## 2021-03-05 RX ADMIN — SODIUM CHLORIDE 300 ML: 9 INJECTION, SOLUTION INTRAVENOUS at 08:05

## 2021-03-05 RX ADMIN — TACROLIMUS 1 MG: 1 CAPSULE ORAL at 06:28

## 2021-03-05 RX ADMIN — HEPARIN SODIUM 1000 UNITS: 1000 INJECTION INTRAVENOUS; SUBCUTANEOUS at 08:17

## 2021-03-05 RX ADMIN — PREDNISONE 5 MG: 5 TABLET ORAL at 06:27

## 2021-03-05 RX ADMIN — Medication 1 TABLET: at 12:53

## 2021-03-05 RX ADMIN — SULFAMETHOXAZOLE AND TRIMETHOPRIM 80 MG: 400; 80 TABLET ORAL at 06:44

## 2021-03-05 RX ADMIN — HEPARIN SODIUM 1000 UNITS/HR: 1000 INJECTION INTRAVENOUS; SUBCUTANEOUS at 08:18

## 2021-03-05 ASSESSMENT — MIFFLIN-ST. JEOR: SCORE: 1080.75

## 2021-03-05 NOTE — PLAN OF CARE
FOCUS/GOAL  Bowel management, Bladder management, Pain management, Mobility, Skin integrity, and Safety management    ASSESSMENT, INTERVENTIONS AND CONTINUING PLAN FOR GOAL:  A/O, VS stable. Ate most of meal,  and 171. Continent of bowel and bladder, last BM today. No complaints of pain this shift. Pt is modified independent with four wheeled walker.  provided help with oxygen tank and cord management. Pt has a peg tube that is clean and was decannulated today. Dressing over trach site is clean and intact. Pt has a few bruises from lab draws. No other skin concerns. Calls appropriately with light. Pt scheduled to be discharged tomorrow after dialysis and family training. Continue POC.

## 2021-03-05 NOTE — CONSULTS
03/05/21 2531 Debra Stahl, RN     Patient and spouse seen at bedside for Blood Glucose meter and insulin teaching. Pt. RD correctly on a model with use of a demo Rapid Acting insulin pen and use of sliding scale for correct insulin dose. Was able to calculate dose accurately. RD on herself with Tesfaye Contour Next demo Meter. Discussed importance of checking BS, calculating correct insulin dose, consistent diet and long term complications of high and low blood sugars.  States she understands all information presented.   Literature Given: Understanding Diabetes, My Diabetes Treatment Plan, Rapid-Acting Insulin, and Healthy Injections Sites.

## 2021-03-05 NOTE — PLAN OF CARE
Speech Language Therapy Discharge Summary    Reason for therapy discharge:    Discharged to home.    Progress towards therapy goal(s). See goals on Care Plan in [facility] electronic health record for goal details.  Goals met    Therapy recommendation(s):    No further therapy is recommended.Pt has met her swallow goals and communication goals- was decannulated today - no longer has  tracheostomy- tolerating well and is independently using pressure over trach site to help achieve voicing. No further sptx is indicated following d/c

## 2021-03-05 NOTE — PLAN OF CARE
Alert and oriented x4. Continent of bowel and bladder. Denies pain. Mod I with walker. Regular thin diet. Dialysis in am. Discharge instructions reviewed with patient and patient's . Waiting for medications from pharmacy and patient is ready to discharge.

## 2021-03-05 NOTE — PROGRESS NOTES
HEMODIALYSIS TREATMENT NOTE    Date: 3/5/2021  Time: 12:12 PM    Data:  Pre Wt: 53.5 kg (117 lb 15.1 oz)   Desired Wt: 50.16 kg   Post Wt: 50.5 kg (111 lb 5.3 oz)  Weight change: 3 kg  Ultrafiltration - Post Run Net Total Removed (mL): 3000 mL  Vascular Access Status: patent  Dialyzer Rinse: Clear  Total Blood Volume Processed: 64.25 L Liters  Total Dialysis (Treatment) Time: 3.5 Hours    Lab:       Interventions:  3.5 hours of HD with 3000 mls pulled with no complications. Did take 15 minutes to get access to clot.     Assessment:  ESRD patient in for her regular HD run. VSS A+O      Plan:    Discharge to home

## 2021-03-05 NOTE — TELEPHONE ENCOUNTER
Spoke with MERRICK Mccloud regarding patient. Unable to discharge home on posaconazole 300 mg TID, so will start 300 mg daily tomorrow. Level from today will not likely be back until Tuesday, but will be reflective of TID dosing. Also awaiting reply from ID. Plan for labs (including tacro level and hepatic panel) on Friday next week. Needs to see us in clinic in early April with a bronch (appt pending after speaking to patient and figuring out schedule with dialysis).

## 2021-03-05 NOTE — DISCHARGE SUMMARY
Dialysis Discharge Summary Brief    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  Eventually went to rehab on 2/25.      She was ESRD prior to this hospitalization but only requiring 2x/week HD as she has some low level GFR.  Residual UOP has not returned after sepsis and intubation course, was on pressors for several days and needed a course of CRRT for 3 days from 2/5-2/8 but has been stable on 3x/week HD for several weeks since.      We did have an issue with her graft/fistula on 3/1 requiring declot in IR, long term she was inquiring about whether revision would be worth considering and would like to eventually discuss this with a surgeon.      She is discharging home today 3/5 after HD run, next outpt run will be on 3/8.  Please page me at number below with any questions.      [x] Resume all previous dialysis orders with exception as noted below    New Orders (if not applicable put NA):  Estimated Dry Weight 51kg   Dialysis Duration 3h, MWF   Dialysis Access L arm AVF/G   Antibiotics (dose per dialysis, end date) N/A           Labs to be drawn at dialysis N/A   Other major changes to dialysis prescription (e.g. Dialysate bath, heparin, blood flow rate, etc)   + heparin, 400bfr, 600dfr, 3k, 2.25ca bath.  4k epo as an inpatient   Medication changes (also fax the unit a copy of the  discharge summary)              Name of provider completing this form: Hardy Tran, APRN CNS   [phone]

## 2021-03-05 NOTE — PLAN OF CARE
FOCUS/GOAL  Bowel management, Bladder management, Pain management, Skin integrity and Cognition/Memory/Judgment/Problem solving    ASSESSMENT, INTERVENTIONS AND CONTINUING PLAN FOR GOAL:  Pt is alert and oriented x4, denies fever, chills, CP, SOB, N/V, abdominal pain, or new weakness/numbness/tingling, continent of bowel and bladder, G tube patent and in place, decannulation dressing CDI, lima ww, fistula in RUE, sleeping well throughout the night, no tubes, lines or drains, vs stable, no further care concerns at this time continue with POC.

## 2021-03-06 ENCOUNTER — HEALTH MAINTENANCE LETTER (OUTPATIENT)
Age: 61
End: 2021-03-06

## 2021-03-08 ENCOUNTER — TELEPHONE (OUTPATIENT)
Dept: TRANSPLANT | Facility: CLINIC | Age: 61
End: 2021-03-08

## 2021-03-08 NOTE — PROGRESS NOTES
[facility]: Post-Discharge Note  SITUATION                                                      Admission:    Admission Date: 02/25/21   Reason for Admission: Respiratory failure  Discharge:   Discharge Date: 03/05/21  Discharge Diagnosis: Respiratory failure    BACKGROUND                                                      Kecia Blue is a 60 year old female with past medical history of HTN, ESRD on dialysis, ILD s/p bilateral lung transplant 03/2018 with complicated postoperative course including: right mainstem bronchial stenosis aspergillus empyema, EBV viremia, CMV viremia.  Kecia was admitted to an outside hospital on 1/22 with acute hypoxemic respiratory failure and new lung opacities on imaging, was ultimately diagnosed with PJP pneumonia, she was intubated and transferred to [facility] on 1/24, course further complicated by ARDS on 2/3, prolonged hypoxic respiratory failure s/p tracheostomy on 2/12 and PEG-J on 2/16, underwent dilation of right main bronchus on 2/19. Admitted to acute rehab unit 2/25/21 for ongoing rehabilitation and medical management.      ASSESSMENT      Discharge Assessment  Patient reports symptoms are: Improved  Does the patient have all of their medications?: Yes  Does patient know what their new medications are for?: Yes  Does patient have a follow-up appointment scheduled?: Yes  Does patient have any other questions or concerns?: No    Post-op  Did the patient have surgery or a procedure: No  Fever: No  Chills: No  Eating & Drinking: eating and drinking without complaints/concerns  Bowel Function: normal  Urinary Status: voiding without complaint/concerns        PLAN                                                      Outpatient Plan:  Follow up with primary care provider, Rosy Vu, within 7 days for hospital follow- up.  The following labs/tests are recommended: CBC, BMP.       Follow up with pulmonary transplant, labs and imaging per their  recommendations.     Follow up with EP cardiology with Zio patch prior to visit.     Follow up with ID for Posaconazole management for aspergillus empyema and history of CMV and EBV viremia.     Follow up with Pulmonology for repeat bronch (4-2-21), trach follow-up     Follow up with Nephrology for continued dialysis.    Future Appointments   Date Time Provider Department Center   4/2/2021 11:30 AM Kristen Alvarado MD [facility]   4/8/2021  8:30 AM Yenni Graham MD [facility]   6/22/2021 11:00 AM Feng Denise MD [facility]           Hayde Stauffer

## 2021-03-08 NOTE — TELEPHONE ENCOUNTER
"Called and spoke with pt regarding follow up from her discharge from TCU last Friday.  Pt states, \"I'm doing great.  Everything is going swell.\"  Pt states that she has dialysis MWF.  She plans to have labs drawn every other Monday, starting next week (3/15).  Pt will start working with PT at a rehab location.  Clarified medication questions.  Will recheck magnesium lab next week to evaluate need for replacement.  Informed pt of plan for follow up clinic visit and bronch in early April.  Pt agreeable to clinic visit at 0830 on 4/8.  Pt verbalized understanding and will call with any further questions or concerns.  "

## 2021-03-09 ENCOUNTER — TELEPHONE (OUTPATIENT)
Dept: TRANSPLANT | Facility: CLINIC | Age: 61
End: 2021-03-09

## 2021-03-09 DIAGNOSIS — I48.92 ATRIAL FLUTTER, UNSPECIFIED TYPE (H): Primary | ICD-10-CM

## 2021-03-09 LAB — POSACONAZOLE SERPL-MCNC: 7.1 UG/ML (ref 0.7–5)

## 2021-03-10 ENCOUNTER — TELEPHONE (OUTPATIENT)
Dept: PULMONOLOGY | Facility: CLINIC | Age: 61
End: 2021-03-10

## 2021-03-10 DIAGNOSIS — Z94.2 LUNG REPLACED BY TRANSPLANT (H): Primary | ICD-10-CM

## 2021-03-10 NOTE — TELEPHONE ENCOUNTER
Spoke with patient to schedule procedure with Dr. Mati Norris   Procedure was scheduled on 04/09 at [facility] OR  Patient will have H&P with Transplant/Pulm team    Patient is aware a COVID-19 test is needed before their procedure. The test should be with-in 4 days of their procedure.   Test Details: Date 04/08 Location [facility]    Patient is aware a / is needed day of surgery.   Surgery letter was sent via [employer], patient has my direct contact information for any further questions.

## 2021-03-10 NOTE — TELEPHONE ENCOUNTER
Patient reports she is doing well post TCU discharge.   Wondering about lasix. This was started per renal before she was hospitalized. This writer spoke with RN at her dialysis center who will discuss with with the nephrologist when he rounds on Wednesday. Okay to take if needed per renal.   Okay to resume iron as well if dialysis okay.   Message sent to IP team re OR bronch sometime on or after 4/8.

## 2021-03-10 NOTE — RESULT ENCOUNTER NOTE
Pt now on lower dose of posaconazole than when this level was drawn, 300 mg daily. Plan to repeat level in 1 week per ID. Order faxed to local lab.

## 2021-03-10 NOTE — TELEPHONE ENCOUNTER
Action    Action Taken 3-10: Requested EKG 1-23-21 and 10-17-19 from [facility]  3-10: 1-23-21 CT chest from [facility] in PACS

## 2021-03-11 ENCOUNTER — TELEPHONE (OUTPATIENT)
Dept: TRANSPLANT | Facility: CLINIC | Age: 61
End: 2021-03-11

## 2021-03-11 NOTE — TELEPHONE ENCOUNTER
----- Message from Mikayla Latham RN sent at 3/9/2021  2:12 PM CST -----  Regarding: testing before 4/8 appt  Hi,   Can you please schedule this patient for lab, xray, PFT before her appointment on 4/8 with Dr. Graham? Thanks!    Mikayla

## 2021-03-11 NOTE — TELEPHONE ENCOUNTER
Spoke with the patient and confirmed post lung appointments on 4/8/21.  Informed patient an itinerary can be accessed on [employer].

## 2021-03-15 ENCOUNTER — TELEPHONE (OUTPATIENT)
Dept: TRANSPLANT | Facility: CLINIC | Age: 61
End: 2021-03-15

## 2021-03-15 DIAGNOSIS — Z94.2 LUNG REPLACED BY TRANSPLANT (H): ICD-10-CM

## 2021-03-15 DIAGNOSIS — I48.92 ATRIAL FLUTTER, UNSPECIFIED TYPE (H): Primary | ICD-10-CM

## 2021-03-15 PROCEDURE — 80197 ASSAY OF TACROLIMUS: CPT | Performed by: PHYSICIAN ASSISTANT

## 2021-03-15 NOTE — TELEPHONE ENCOUNTER
Critical alk phos level given at 290.  This is below baseline for patient.  We will continue to monitor.

## 2021-03-15 NOTE — TELEPHONE ENCOUNTER
DATE:  3/15/2021   TIME OF RECEIPT FROM LAB:  10:11a  LAB TEST:  Alk phos  LAB VALUE:  290  RESULTS GIVEN WITH READ-BACK TO (PROVIDER):  Nasreen Francois  TIME LAB VALUE REPORTED TO PROVIDER:   10:13a

## 2021-03-16 ENCOUNTER — VIRTUAL VISIT (OUTPATIENT)
Dept: INFECTIOUS DISEASES | Facility: CLINIC | Age: 61
End: 2021-03-16
Attending: INTERNAL MEDICINE
Payer: COMMERCIAL

## 2021-03-16 DIAGNOSIS — Z94.2 LUNG TRANSPLANT STATUS, BILATERAL (H): ICD-10-CM

## 2021-03-16 DIAGNOSIS — B44.89 INFECTION BY ASPERGILLUS FUMIGATUS (H): ICD-10-CM

## 2021-03-16 DIAGNOSIS — B59 PNEUMONIA DUE TO PNEUMOCYSTIS JIROVECII, UNSPECIFIED LATERALITY, UNSPECIFIED PART OF LUNG (H): Primary | ICD-10-CM

## 2021-03-16 LAB
TACROLIMUS BLD-MCNC: 10.1 UG/L (ref 5–15)
TME LAST DOSE: NORMAL H

## 2021-03-16 PROCEDURE — 99213 OFFICE O/P EST LOW 20 MIN: CPT | Mod: 95 | Performed by: INTERNAL MEDICINE

## 2021-03-16 NOTE — PROGRESS NOTES
Kecia is a 60 year old who is being evaluated via a billable video visit.      How would you like to obtain your AVS? MyChart  If the video visit is dropped, the invitation should be resent by: Text to cell phone: [phone]  Will anyone else be joining your video visit? No      Video-Visit Details    Type of service:  Video Visit  Video Time:15 minutes  Originating Location (pt. Location): Home  Distant Location (provider location):  [facility]ECT[facility] [place]   Platform used for Video Visit: [employer]  _______________________________________________________________________________________________________    [facility]  Transplant Infectious Disease Progress Note     Patient:  Kecia Blue, Date of birth 1960, Medical record number [id number]  Date of Visit:  04/12/2021         Assessment and Recommendations:   Recommendations:  --Cont PO Posaconazole 300 mg once daily  --Cont bactrim for secondary prophylaxis.     Vinicio Layton MD  Transplant Infectious Disease  Pager [phone]     Problem List/Assessement:  1. Recent severe PCP pneumonia/AHRF  2. Aspergillus fumigatus lung infection   3. Stable left Aspergillus empyema  4. S/p lung transplant 3/1/2018 (CMV D+/R+, EBV D+/R+).                -Post transplant course has been complicated by right mainstem bronchial stenosis treated with serial dilations-most recently March 2019,      4. ESRD on iHD  5. Liver dysfunction-undetermined etiology  6. Prior hx of CMV viremia  7. Low level EBV viremia     Patient presents as a follow up visit following recent hospitalization with AHRF 2/2 PCP PNA. She is clinically doing well. Continues to take bactrim  With regards to Aspergillus, she continues on posaconazole. Last CT chest in February shows stable empyema. We have had ongoing discussions with surgical teams regarding surgical interventions for source control. However, there is high concern for high  morbidity with surgery and therefore will continue to manage medically at this time with long term posaconazole.        Interval History:   Transplants:  3/1/2018 (Lung), Postoperative day:  1138.  Coordinator Mikayla Latham    HPI:    Patient is a 60 yo female with PMHx significant for ILD, seronegative rheumatoid arthritis and dermatomyositis s/p B/L lung transplantation 3/1/2018 (CMV D+/R+, EBV D+/R+). Post transplant course was complicated by                -right mainstem bronchial stenosis treated with serial dilations-most recently March 2019,                -Left sided aspergillus empyema 10/08/2019 and                -CMV viremia.   Her immunosuppression includes tacrolimus and prednisone. Her PMHx is also significant for liver dysfunction of unclear etiology with ascites and portal HTN, as well as ESRD now on iHD and being considered for renal transplant     Briefly to review relevant medical history,     --Hospitalized 10/6/19-10/12/19--admitted with 3 month hx of left sided upper abd/chest pain. 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 Underwent CT guided biopsy on 10/8/20 which yielded purulent fluid--cx positive for aspergillus fumigatus. She underwent bronchoscopy with BAL on 10/11/20 which showed septate hyphae.  Notably, she had also grown Actinomyces from multiple BAL specimens in the past. She was started on voriconazole at the time.      Patient subsequently underwent repaet CT chest on 7/18/20 at OSH and was noted to have interval increased hypodense mass like region in medial aspect of left lower lung. As a result, patient underwent CT guided lung tissue biopsy. Histopath showed acute inflammation, necrosis and myofibroblastic proliferation with focal fungal hyphae highlighted by GMS.  Cultures positive for Aspergillus fumigatus.    1/5/21- BAL Cx with stenotrophomonas maltophilia-treated with ceftazidime x 2 weeks    1/24/21-2/25/21 hospitalized with AHRF requiring  intubation 2/2 PCP pneumonia/ARDS with subsequent trach on 2/12/21. She was discharged to the acute rehab and subsequently went home on 3/5/21.    On follow up visit today, patient states she is doing well. She denies fevers/chills/sweats. No dyspnea/cough/chest pain. Has good energy and active at home. No other symptoms  She is scheduled for bronch early next month    Review of Systems: 10 point ROS unremarkable unless stated otherwise in HPI.         Current Medications & Allergies:     Reviewed  No Known Allergies         Physical Exam:     Physical Examination: (limited exam on video)  GENERAL:  well-developed, well-nourished, up in chair  HEAD:  Head is normocephalic, atraumatic   EYES:  Eyes have anicteric sclerae without conjunctival injection   LUNGS:  Normal work of breathing on roomair  ABDOMEN:  Non distended.  SKIN:  No acute rashes on visible skin  NEUROLOGIC:  Grossly nonfocal. Active x4 extremities         Laboratory Data:     Inflammatory Markers    Recent Labs   Lab Test 10/07/19  1221 10/06/19  1444 09/13/19  0934 09/11/19  2325 08/22/19  0820   SED 93*  --  111* 102*  --    CRP  --  25.0* 58.0* 66.0* 47.0*       Immune Globulin Studies     Recent Labs   Lab Test 01/31/21  0441 01/25/21  0406 10/27/19  0621 03/01/18  0356 02/19/18  0759     --  936 698 1,790*   IGM  --   --   --   --  502*   IGE  --  <2  --   --  <2   IGA  --   --   --   --  425*   IGG1  --   --   --   --  1,300*   IGG2  --   --   --   --  131*   IGG3  --   --   --   --  101   IGG4  --   --   --   --  1*       Metabolic Studies       Recent Labs   Lab Test 04/08/21  0722 03/29/21 02/09/21  1615 02/09/21  1615 02/04/21  0915 02/04/21  0915 02/03/21  0415 02/03/21  0415 01/24/21  1529 01/24/21  1529 01/24/21  1458 10/21/19  1752 10/21/19  1752 08/07/19  0959 08/07/19  0959    141   < > 135   < >  --    < > 133   < >  --  134   < > 133   < > 133   POTASSIUM 3.7 4.3   < > 4.3   < >  --    < > 3.3*   < >  --  3.7   < > 5.0    < > 4.2   CHLORIDE 101 101   < > 102   < >  --    < > 98   < >  --  98   < > 109   < > 98   CO2 32 25   < > 28   < >  --    < > 25   < >  --  16*   < > 10*   < > 29   ANIONGAP 5 19.3   < > 5   < >  --    < > 10   < >  --  20*   < > 13   < > 6   BUN 22 49.5  11.00   < > 20   < >  --    < > 57*   < >  --  48*   < > 66*   < > 27   CR 2.49* 4.50   < > 1.08*   < >  --    < > 3.45*   < >  --  3.39*   < > 5.76*   < > 1.80*   GFRESTIMATED 21* 10   < > 56*   < >  --    < > 14*   < >  --  14*   < > 8*   < > 31*   * 122*   < > 153*   < >  --    < > 138*   < >  --  155*   < > 138*   < > 119*   A1C  --   --   --   --   --   --   --   --   --   --  6.0*  --   --   --   --    CHELSEY 7.9* 7.9   < > 8.3*   < >  --    < > 8.9   < >  --  8.2*   < > 8.0*   < > 9.8   PHOS  --  5.8   < >  --    < >  --    < > 2.6   < >  --  6.5*   < > 6.7*   < >  --    MAG 1.5* 1.4   < >  --    < >  --    < > 2.1   < >  --  2.4*   < > 2.0   < > 2.2   LACT  --   --   --  0.5*  --   --   --   --   --  0.7  --   --   --    < >  --    PCAL  --   --   --   --   --   --   --  0.42  --   --   --   --   --    < >  --    FGTL  --   --   --   --   --  254  --   --    < >  --   --    < >  --    < >  --    CKT  --   --   --   --   --   --   --   --   --   --   --   --  70  --  36    < > = values in this interval not displayed.       Hepatic Studies    Recent Labs   Lab Test 03/29/21 03/01/21  0637 02/27/21  0853 01/26/21  0425 01/26/21  0425 09/14/19  0533 09/14/19  0533   BILITOTAL 0.9 0.7 0.4   < > 0.8   < > 0.2   DBIL  --  0.3* 0.2   < > 0.6*   < >  --    ALKPHOS 288 339* 326*   < > 184*   < > 220*   PROTTOTAL  --  6.8 6.6*   < > 6.0*  6.0*   < > 6.7*   ALBUMIN 3.5 2.8* 2.5*   < > 2.0*   < > 2.2*   AST 19 17 16   < > 11   < > 20   ALT 36 49 44   < > 30   < > 14   LDH  --   --   --   --  187  --  258*    < > = values in this interval not displayed.       Hematology Studies      Recent Labs   Lab Test 04/08/21  0722 03/29/21 03/04/21  0611 03/01/21  0637  02/27/21  0853 02/25/21  0542   WBC 6.3 8.4 7.5 5.6 4.2 2.9*   ANEU 4.8  --   --   --   --  1.9   ALYM 0.7*  --   --   --   --  0.6*   SHERRI 0.8  --   --   --   --  0.4   AEOS 0.1  --   --   --   --  0.1   HGB 9.7* 10.5* 9.3* 9.5* 9.8* 9.9*   HCT 29.9* 33.4 29.6* 29.6* 30.6* 32.0*    176 237 311 268 293       Arterial Blood Gas Testing    Recent Labs   Lab Test 02/10/21  2000 02/10/21  1555 02/09/21  1225 02/09/21  0403 02/08/21  1200   PH 7.41 7.36 7.38 7.42 7.40   PCO2 42 46* 49* 44 47*   PO2 90 105 74* 116* 72*   HCO3 26 26 29* 28 29*   O2PER 50 50 60 60.0 60        Medication levels    Recent Labs   Lab Test 04/08/21  0723 03/05/21  0735 03/05/21  0735 01/28/21  0412 01/28/21  0412 03/19/20  1032 03/19/20  1032   VANCOMYCIN  --   --   --   --  21.6   < >  --    VCON  --   --   --   --   --   --  1.4   PSCON  --   --  7.1*   < >  --    < >  --    TACROL 6.1   < >  --    < >  --    < > 10.3    < > = values in this interval not displayed.       Body fluid stats    Recent Labs   Lab Test 02/19/21  0853 01/25/21  1347 01/25/21  1347 01/24/21  1629 12/23/20  1104 10/22/19  1430 10/22/19  1430 12/14/18  1054 12/14/18  1054   FTYP  --   --  Pleural fluid Bronchoalveolar Lavage Bronchial  --  Ascites   < > Bronchial washing   FCOL  --   --  Yellow Pink Colorless  --  Yellow   < > Pink   FAPR  --   --  Cloudy Slightly Cloudy Clear  --  Clear   < > Turbid   FRBC  --   --   --   --   --   --   --   --  << Do Not Report >>   FWBC  --   --  660 355 45  --  44   < > 9520   FNEU  --   --  87 81 41  --  4   < > 94   FLYM  --   --   --  5 9  --  49  --  1   FMONO  --   --  13  --  47  --   --    < > 4   FBAS  --   --   --   --  2  --   --   --   --    FALB  --   --   --   --   --   --  0.8  --   --    FTP  --   --  3.4  --   --   --  1.7  --   --    GS >25 PMNs/low power field  Rare  Gram positive cocci  *   < > No organisms seen  Moderate  WBC'S seen  predominantly mononuclear cells   >25 PMNs/low power field  No  organisms seen  --    < > No organisms seen  Few  WBC'S seen  predominantly mononuclear cells    Quantification of host cells and microbiological organisms was done on a cytocentrifuged   preparation.     < > >25 PMNs/low power field  Many  Gram negative diplococci  *  Moderate  Gram positive cocci  *    < > = values in this interval not displayed.       Microbiology:  Fungal testing  Recent Labs   Lab Test 02/04/21  0915 01/28/21  1232 09/09/20  0822 10/11/19  0924 10/07/19  1544 09/14/19  1625   FGTL 254 261 106  --  269 122   ASPGAI  --   --  0.06 7.57 1.13 1.08   ASPAG  --   --   --  Positive*  --   --    ASPGAA  --   --  Negative  --  Positive* Positive*       Last Culture results with specimen source  Culture Micro   Date Value Ref Range Status   02/19/2021   Preliminary    Culture received and in progress.  Positive AFB results are called as soon as detected.    Final report to follow in 7 to 8 weeks.     02/19/2021   Preliminary    Assayed at [facility] [address] [facility] [place] [address] [phone]   02/19/2021 Culture negative after 4 weeks  Final   02/19/2021 No growth after 4 weeks  Final   02/19/2021 Moderate growth  Staphylococcus epidermidis   (A)  Final   02/09/2021 No growth  Final   02/09/2021 No growth  Final   02/03/2021 No growth  Final   02/03/2021 No growth  Final   02/03/2021 No growth  Final   01/25/2021 No growth  Final   01/25/2021 Culture negative for acid fast bacilli  Final   01/25/2021   Final    Assayed at [facility] [address] [facility] [place] [address] [phone]   01/25/2021 No anaerobes isolated  Final   01/25/2021 Culture negative after 4 weeks  Final   01/24/2021 No growth  Final    Specimen Description   Date Value Ref Range Status   02/19/2021 Bronchoalveolar Lavage  Final   02/19/2021 Bronchoalveolar Lavage  Final   02/19/2021 Bronchoalveolar Lavage Right  Final   02/19/2021 Bronchoalveolar Lavage Right  Final   02/19/2021 Bronchoalveolar Lavage Right   Final   02/19/2021 Bronchoalveolar Lavage Right  Final   02/13/2021 Feces  Final   02/09/2021 Blood Right Arm  Final   02/09/2021 Blood Right Hand  Final   02/03/2021 Sputum  Final   02/03/2021 Sputum  Final   02/03/2021 Nares  Final   02/03/2021 Blood PICC  Final   02/03/2021 Blood Right Hand  Final   01/25/2021 Pleural fluid  Final   01/25/2021 Pleural fluid  Final   01/25/2021 Pleural fluid  Final   01/25/2021 Pleural fluid  Final   01/25/2021 Pleural fluid  Final   01/25/2021 Pleural fluid  Final        Last check of C difficile  C Diff Toxin B PCR   Date Value Ref Range Status   02/13/2021 Negative NEG^Negative Final     Comment:     Negative: C. difficile target DNA sequences NOT detected, presumed negative   for C.difficile toxin B or the number of bacteria present may be below the   limit of detection for the test.  FDA approved assay performed using [employer] GeneXpert real-time PCR.  A negative result does not exclude actual disease due to C. difficile and may   be due to improper collection, handling and storage of the specimen or the   number of organisms in the specimen is below the detection limit of the assay.         Infection Studies to assess Diarrhea   Recent Labs   Lab Test 12/04/18  1500 08/02/18  2253   POPRT  --  Routine parasitology exam negative  Cryptosporidium, Cyclospora, and Microsporidia are not readily detected by this method. A   single negative specimen does not rule out parasitic infection.     EPCAMP Not Detected Not Detected   EPSALM Not Detected Not Detected   EPSHGL Not Detected Not Detected   EPVIB Not Detected Not Detected   EPROTA Not Detected Not Detected   EPNORO Not Detected Not Detected   EPYER Not Detected Not Detected   GIART Negative for Giardia lamblia specific antigen by immunoassay.  --        Virology:  Coronavirus-19 testing    Recent Labs   Lab Test 04/08/21  0723 03/04/21  1550 02/12/21  1545 02/03/21  0924 01/23/21  2327 01/23/21  2327 01/22/21  1434  10/09/20  1714   [id number] Nasopharyngeal Nasopharyngeal Nasopharyngeal Nasopharyngeal   < >  --   --   --    SARSCOVRES NEGATIVE NEGATIVE NEGATIVE NEGATIVE  --   --   --   --    [id number] Nasopharyngeal  --   --   --   --   --   --   --    [id number] Test received-See reflex to IDDL test SARS CoV2 (COVID-19) Virus RT-PCR  --   --   --   --   --   --   --    COVIDPCREXT  --   --   --   --   --  Negative Negative Negative    < > = values in this interval not displayed.       Respiratory virus (non-coronavirus-19) testing    Recent Labs   Lab Test 01/24/21  1822 01/24/21  1629 12/23/20  1102 02/22/18  0900 02/22/18  0900   RVSPEC  --  Bronchial Bronchial   < >  --    AFLU  --   --   --   --  Duplicate request*   IFLUA Not Detected Negative Negative   < >  --    INFZA  --   --   --   --  Negative   FLUAH1 Not Detected Negative Negative   < >  --    NW0441 Not Detected Negative Negative   < >  --    FLUAH3 Not Detected Negative Negative   < >  --    BFLU  --   --   --   --  Duplicate request*   IFLUB Not Detected Negative Negative   < >  --    INFZB  --   --   --   --  Negative   PIV1 Not Detected Negative Negative   < >  --    PIV2 Not Detected Negative Negative   < >  --    PIV3 Not Detected Negative Negative   < >  --    PIV4 Not Detected  --   --    < >  --    IRSV  --   --   --   --  Negative   HRVS  --  Negative Negative   < >  --    RSVA Not Detected Negative Negative   < >  --    RSVB Not Detected Negative Negative   < >  --    HMPV Not Detected Negative Negative   < >  --    ADVBE  --  Negative Negative   < >  --    ADVC  --  Negative Negative   < >  --    ADENOV Not Detected  --   --    < >  --    CORONA Not Detected  --   --    < >  --     < > = values in this interval not displayed.       Log IU/mL of CMVQNT   Date Value Ref Range Status   02/25/2021 Not Calculated <2.1 [Log_IU]/mL Final   01/25/2021 <2.1 <2.1 [Log_IU]/mL Final   01/24/2021 2.4 (H) <2.1 [Log_IU]/mL Final   12/23/2020 2.8 (H) <2.1  [Log_IU]/mL Final   12/09/2020 <2.1 <2.1 [Log_IU]/mL Final   09/21/2020 <2.1 <2.1 [Log_IU]/mL Final   09/09/2020 Not Calculated <2.1 [Log_IU]/mL Final   03/09/2020 <2.1 <2.1 [Log_IU]/mL Final   11/11/2019 <2.1 <2.1 [Log_IU]/mL Final   10/24/2019 Not Calculated <2.1 [Log_IU]/mL Final   10/06/2019 Not Calculated <2.1 [Log_IU]/mL Final   09/12/2019 <2.1 <2.1 [Log_IU]/mL Final   06/05/2019 <2.1 <2.1 [Log_IU]/mL Final   03/07/2019 2.7 (H) <2.1 [Log_IU]/mL Final   01/17/2019 Not Calculated <2.1 [Log_IU]/mL Final   01/16/2019 <2.1 <2.1 [Log_IU]/mL Final   12/14/2018 2.7 (H) <2.1 [Log_IU]/mL Final   12/13/2018 <2.1 <2.1 [Log_IU]/mL Final   12/04/2018 <2.1 <2.1 [Log_IU]/mL Final   11/20/2018 3.2 (H) <2.1 [Log_IU]/mL Final   11/19/2018 Not Calculated <2.1 [Log_IU]/mL Final   10/29/2018 3.8 (H) <2.1 [Log_IU]/mL Final   10/09/2018 <2.1 <2.1 [Log_IU]/mL Final   09/10/2018 <2.1 <2.1 [Log_IU]/mL Final   09/04/2018 <2.1 <2.1 [Log_IU]/mL Final   08/20/2018 2.8 (H) <2.1 [Log_IU]/mL Final   08/02/2018 6.5 (H) <2.1 [Log_IU]/mL Final   08/01/2018 3.8 (H) <2.1 [Log_IU]/mL Final   07/10/2018 4.3 (H) <2.1 [Log_IU]/mL Final   07/10/2018 4.0 (H) <2.1 [Log_IU]/mL Final       No results found for: H6RES    EBV DNA Copies/mL   Date Value Ref Range Status   02/22/2021 75,709 (A) EBVNEG^EBV DNA Not Detected [Copies]/mL Final   01/25/2021 5,619 (A) EBVNEG^EBV DNA Not Detected [Copies]/mL Final   12/09/2020 10,686 (A) EBVNEG^EBV DNA Not Detected [Copies]/mL Final   09/09/2020 2,935 (A) EBVNEG^EBV DNA Not Detected [Copies]/mL Final   03/09/2020 8,918 (A) EBVNEG^EBV DNA Not Detected [Copies]/mL Final   02/19/2020 38,419 (A) EBVNEG^EBV DNA Not Detected [Copies]/mL Final       BK Virus Result   Date Value Ref Range Status   08/07/2019 BK Virus DNA Not Detected BKNEG^BK Virus DNA Not Detected copies/mL Final

## 2021-03-16 NOTE — LETTER
3/16/2021       RE: Kecia Blue  [address] [place] [address]     Dear Colleague,    Thank you for referring your patient, Kecia Blue, to the [facility] [place] at [facility]. Please see a copy of my visit note below.    Kecia is a 60 year old who is being evaluated via a billable video visit.      How would you like to obtain your AVS? MyChart  If the video visit is dropped, the invitation should be resent by: Text to cell phone: [phone]  Will anyone else be joining your video visit? No      Video-Visit Details    Type of service:  Video Visit  Video Time:15 minutes  Originating Location (pt. Location): Home  Distant Location (provider location):  [facility] [place]   Platform used for Video Visit: Resonergy  _______________________________________________________________________________________________________    [facility]  Transplant Infectious Disease Progress Note     Patient:  Kecia Blue, Date of birth 1960, Medical record number [id number]  Date of Visit:  04/12/2021         Assessment and Recommendations:   Recommendations:  --Cont PO Posaconazole 300 mg once daily  --Cont bactrim for secondary prophylaxis.     Vinicio Layton MD  Transplant Infectious Disease  Pager [phone]     Problem List/Assessement:  1. Recent severe PCP pneumonia/AHRF  2. Aspergillus fumigatus lung infection   3. Stable left Aspergillus empyema  4. S/p lung transplant 3/1/2018 (CMV D+/R+, EBV D+/R+).                -Post transplant course has been complicated by right mainstem bronchial stenosis treated with serial dilations-most recently March 2019,      4. ESRD on iHD  5. Liver dysfunction-undetermined etiology  6. Prior hx of CMV viremia  7. Low level EBV viremia     Patient presents as a follow up visit following recent hospitalization with  AHRF 2/2 PCP PNA. She is clinically doing well. Continues to take bactrim  With regards to Aspergillus, she continues on posaconazole. Last CT chest in February shows stable empyema. We have had ongoing discussions with surgical teams regarding surgical interventions for source control. However, there is high concern for high morbidity with surgery and therefore will continue to manage medically at this time with long term posaconazole.        Interval History:   Transplants:  3/1/2018 (Lung), Postoperative day:  1138.  Coordinator Mikayla Latham    HPI:    Patient is a 58 yo female with PMHx significant for ILD, seronegative rheumatoid arthritis and dermatomyositis s/p B/L lung transplantation 3/1/2018 (CMV D+/R+, EBV D+/R+). Post transplant course was complicated by                -right mainstem bronchial stenosis treated with serial dilations-most recently March 2019,                -Left sided aspergillus empyema 10/08/2019 and                -CMV viremia.   Her immunosuppression includes tacrolimus and prednisone. Her PMHx is also significant for liver dysfunction of unclear etiology with ascites and portal HTN, as well as ESRD now on iHD and being considered for renal transplant     Briefly to review relevant medical history,     --Hospitalized 10/6/19-10/12/19--admitted with 3 month hx of left sided upper abd/chest pain. 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 Underwent CT guided biopsy on 10/8/20 which yielded purulent fluid--cx positive for aspergillus fumigatus. She underwent bronchoscopy with BAL on 10/11/20 which showed septate hyphae.  Notably, she had also grown Actinomyces from multiple BAL specimens in the past. She was started on voriconazole at the time.      Patient subsequently underwent repaet CT chest on 7/18/20 at OSH and was noted to have interval increased hypodense mass like region in medial aspect of left lower lung. As a result, patient underwent CT guided  lung tissue biopsy. Histopath showed acute inflammation, necrosis and myofibroblastic proliferation with focal fungal hyphae highlighted by GMS.  Cultures positive for Aspergillus fumigatus.    1/5/21- BAL Cx with stenotrophomonas maltophilia-treated with ceftazidime x 2 weeks    1/24/21-2/25/21 hospitalized with AHRF requiring intubation 2/2 PCP pneumonia/ARDS with subsequent trach on 2/12/21. She was discharged to the acute rehab and subsequently went home on 3/5/21.    On follow up visit today, patient states she is doing well. She denies fevers/chills/sweats. No dyspnea/cough/chest pain. Has good energy and active at home. No other symptoms  She is scheduled for bronch early next month    Review of Systems: 10 point ROS unremarkable unless stated otherwise in HPI.         Current Medications & Allergies:     Reviewed  No Known Allergies         Physical Exam:     Physical Examination: (limited exam on video)  GENERAL:  well-developed, well-nourished, up in chair  HEAD:  Head is normocephalic, atraumatic   EYES:  Eyes have anicteric sclerae without conjunctival injection   LUNGS:  Normal work of breathing on roomair  ABDOMEN:  Non distended.  SKIN:  No acute rashes on visible skin  NEUROLOGIC:  Grossly nonfocal. Active x4 extremities         Laboratory Data:     Inflammatory Markers    Recent Labs   Lab Test 10/07/19  1221 10/06/19  1444 09/13/19  0934 09/11/19  2325 08/22/19  0820   SED 93*  --  111* 102*  --    CRP  --  25.0* 58.0* 66.0* 47.0*       Immune Globulin Studies     Recent Labs   Lab Test 01/31/21  0441 01/25/21  0406 10/27/19  0621 03/01/18  0356 02/19/18  0759     --  936 698 1,790*   IGM  --   --   --   --  502*   IGE  --  <2  --   --  <2   IGA  --   --   --   --  425*   IGG1  --   --   --   --  1,300*   IGG2  --   --   --   --  131*   IGG3  --   --   --   --  101   IGG4  --   --   --   --  1*       Metabolic Studies       Recent Labs   Lab Test 04/08/21  0722 03/29/21 02/09/21  1615  02/09/21  1615 02/04/21  0915 02/04/21  0915 02/03/21  0415 02/03/21  0415 01/24/21  1529 01/24/21  1529 01/24/21  1458 10/21/19  1752 10/21/19  1752 08/07/19  0959 08/07/19  0959    141   < > 135   < >  --    < > 133   < >  --  134   < > 133   < > 133   POTASSIUM 3.7 4.3   < > 4.3   < >  --    < > 3.3*   < >  --  3.7   < > 5.0   < > 4.2   CHLORIDE 101 101   < > 102   < >  --    < > 98   < >  --  98   < > 109   < > 98   CO2 32 25   < > 28   < >  --    < > 25   < >  --  16*   < > 10*   < > 29   ANIONGAP 5 19.3   < > 5   < >  --    < > 10   < >  --  20*   < > 13   < > 6   BUN 22 49.5  11.00   < > 20   < >  --    < > 57*   < >  --  48*   < > 66*   < > 27   CR 2.49* 4.50   < > 1.08*   < >  --    < > 3.45*   < >  --  3.39*   < > 5.76*   < > 1.80*   GFRESTIMATED 21* 10   < > 56*   < >  --    < > 14*   < >  --  14*   < > 8*   < > 31*   * 122*   < > 153*   < >  --    < > 138*   < >  --  155*   < > 138*   < > 119*   A1C  --   --   --   --   --   --   --   --   --   --  6.0*  --   --   --   --    CHELSEY 7.9* 7.9   < > 8.3*   < >  --    < > 8.9   < >  --  8.2*   < > 8.0*   < > 9.8   PHOS  --  5.8   < >  --    < >  --    < > 2.6   < >  --  6.5*   < > 6.7*   < >  --    MAG 1.5* 1.4   < >  --    < >  --    < > 2.1   < >  --  2.4*   < > 2.0   < > 2.2   LACT  --   --   --  0.5*  --   --   --   --   --  0.7  --   --   --    < >  --    PCAL  --   --   --   --   --   --   --  0.42  --   --   --   --   --    < >  --    FGTL  --   --   --   --   --  254  --   --    < >  --   --    < >  --    < >  --    CKT  --   --   --   --   --   --   --   --   --   --   --   --  70  --  36    < > = values in this interval not displayed.       Hepatic Studies    Recent Labs   Lab Test 03/29/21 03/01/21  0637 02/27/21  0853 01/26/21  0425 01/26/21  0425 09/14/19  0533 09/14/19  0533   BILITOTAL 0.9 0.7 0.4   < > 0.8   < > 0.2   DBIL  --  0.3* 0.2   < > 0.6*   < >  --    ALKPHOS 288 339* 326*   < > 184*   < > 220*   PROTTOTAL  --  6.8 6.6*    < > 6.0*  6.0*   < > 6.7*   ALBUMIN 3.5 2.8* 2.5*   < > 2.0*   < > 2.2*   AST 19 17 16   < > 11   < > 20   ALT 36 49 44   < > 30   < > 14   LDH  --   --   --   --  187  --  258*    < > = values in this interval not displayed.       Hematology Studies      Recent Labs   Lab Test 04/08/21  0722 03/29/21 03/04/21  0611 03/01/21  0637 02/27/21  0853 02/25/21  0542   WBC 6.3 8.4 7.5 5.6 4.2 2.9*   ANEU 4.8  --   --   --   --  1.9   ALYM 0.7*  --   --   --   --  0.6*   SHERRI 0.8  --   --   --   --  0.4   AEOS 0.1  --   --   --   --  0.1   HGB 9.7* 10.5* 9.3* 9.5* 9.8* 9.9*   HCT 29.9* 33.4 29.6* 29.6* 30.6* 32.0*    176 237 311 268 293       Arterial Blood Gas Testing    Recent Labs   Lab Test 02/10/21  2000 02/10/21  1555 02/09/21  1225 02/09/21  0403 02/08/21  1200   PH 7.41 7.36 7.38 7.42 7.40   PCO2 42 46* 49* 44 47*   PO2 90 105 74* 116* 72*   HCO3 26 26 29* 28 29*   O2PER 50 50 60 60.0 60        Medication levels    Recent Labs   Lab Test 04/08/21  0723 03/05/21  0735 03/05/21  0735 01/28/21  0412 01/28/21  0412 03/19/20  1032 03/19/20  1032   VANCOMYCIN  --   --   --   --  21.6   < >  --    VCON  --   --   --   --   --   --  1.4   PSCON  --   --  7.1*   < >  --    < >  --    TACROL 6.1   < >  --    < >  --    < > 10.3    < > = values in this interval not displayed.       Body fluid stats    Recent Labs   Lab Test 02/19/21  0853 01/25/21  1347 01/25/21  1347 01/24/21  1629 12/23/20  1104 10/22/19  1430 10/22/19  1430 12/14/18  1054 12/14/18  1054   FTYP  --   --  Pleural fluid Bronchoalveolar Lavage Bronchial  --  Ascites   < > Bronchial washing   FCOL  --   --  Yellow Pink Colorless  --  Yellow   < > Pink   FAPR  --   --  Cloudy Slightly Cloudy Clear  --  Clear   < > Turbid   FRBC  --   --   --   --   --   --   --   --  << Do Not Report >>   FWBC  --   --  660 355 45  --  44   < > 9520   FNEU  --   --  87 81 41  --  4   < > 94   FLYM  --   --   --  5 9  --  49  --  1   FMONO  --   --  13  --  47  --   --     < > 4   FBAS  --   --   --   --  2  --   --   --   --    FALB  --   --   --   --   --   --  0.8  --   --    FTP  --   --  3.4  --   --   --  1.7  --   --    GS >25 PMNs/low power field  Rare  Gram positive cocci  *   < > No organisms seen  Moderate  WBC'S seen  predominantly mononuclear cells   >25 PMNs/low power field  No organisms seen  --    < > No organisms seen  Few  WBC'S seen  predominantly mononuclear cells    Quantification of host cells and microbiological organisms was done on a cytocentrifuged   preparation.     < > >25 PMNs/low power field  Many  Gram negative diplococci  *  Moderate  Gram positive cocci  *    < > = values in this interval not displayed.       Microbiology:  Fungal testing  Recent Labs   Lab Test 02/04/21  0915 01/28/21  1232 09/09/20  0822 10/11/19  0924 10/07/19  1544 09/14/19  1625   FGTL 254 261 106  --  269 122   ASPGAI  --   --  0.06 7.57 1.13 1.08   ASPAG  --   --   --  Positive*  --   --    ASPGAA  --   --  Negative  --  Positive* Positive*       Last Culture results with specimen source  Culture Micro   Date Value Ref Range Status   02/19/2021   Preliminary    Culture received and in progress.  Positive AFB results are called as soon as detected.    Final report to follow in 7 to 8 weeks.     02/19/2021   Preliminary    Assayed at [facility] [address] [place] [address] [phone]   02/19/2021 Culture negative after 4 weeks  Final   02/19/2021 No growth after 4 weeks  Final   02/19/2021 Moderate growth  Staphylococcus epidermidis   (A)  Final   02/09/2021 No growth  Final   02/09/2021 No growth  Final   02/03/2021 No growth  Final   02/03/2021 No growth  Final   02/03/2021 No growth  Final   01/25/2021 No growth  Final   01/25/2021 Culture negative for acid fast bacilli  Final   01/25/2021   Final    Assayed at [facility] [address] [place] [address] [phone]   01/25/2021 No anaerobes isolated  Final   01/25/2021 Culture negative  after 4 weeks  Final   01/24/2021 No growth  Final    Specimen Description   Date Value Ref Range Status   02/19/2021 Bronchoalveolar Lavage  Final   02/19/2021 Bronchoalveolar Lavage  Final   02/19/2021 Bronchoalveolar Lavage Right  Final   02/19/2021 Bronchoalveolar Lavage Right  Final   02/19/2021 Bronchoalveolar Lavage Right  Final   02/19/2021 Bronchoalveolar Lavage Right  Final   02/13/2021 Feces  Final   02/09/2021 Blood Right Arm  Final   02/09/2021 Blood Right Hand  Final   02/03/2021 Sputum  Final   02/03/2021 Sputum  Final   02/03/2021 Nares  Final   02/03/2021 Blood PICC  Final   02/03/2021 Blood Right Hand  Final   01/25/2021 Pleural fluid  Final   01/25/2021 Pleural fluid  Final   01/25/2021 Pleural fluid  Final   01/25/2021 Pleural fluid  Final   01/25/2021 Pleural fluid  Final   01/25/2021 Pleural fluid  Final        Last check of C difficile  C Diff Toxin B PCR   Date Value Ref Range Status   02/13/2021 Negative NEG^Negative Final     Comment:     Negative: C. difficile target DNA sequences NOT detected, presumed negative   for C.difficile toxin B or the number of bacteria present may be below the   limit of detection for the test.  FDA approved assay performed using [employer] GeneXpert real-time PCR.  A negative result does not exclude actual disease due to C. difficile and may   be due to improper collection, handling and storage of the specimen or the   number of organisms in the specimen is below the detection limit of the assay.         Infection Studies to assess Diarrhea   Recent Labs   Lab Test 12/04/18  1500 08/02/18  2258   POPRT  --  Routine parasitology exam negative  Cryptosporidium, Cyclospora, and Microsporidia are not readily detected by this method. A   single negative specimen does not rule out parasitic infection.     EPCAMP Not Detected Not Detected   EPSALM Not Detected Not Detected   EPSHGL Not Detected Not Detected   EPVIB Not Detected Not Detected   EPROTA Not Detected Not  Detected   EPNORO Not Detected Not Detected   EPYER Not Detected Not Detected   GIART Negative for Giardia lamblia specific antigen by immunoassay.  --        Virology:  Coronavirus-19 testing    Recent Labs   Lab Test 04/08/21  0723 03/04/21  1550 02/12/21  1545 02/03/21  0924 01/23/21  2327 01/23/21  2327 01/22/21  1434 10/09/20  1714   [id number] Nasopharyngeal Nasopharyngeal Nasopharyngeal Nasopharyngeal   < >  --   --   --    SARSCOVRES NEGATIVE NEGATIVE NEGATIVE NEGATIVE  --   --   --   --    [id number] Nasopharyngeal  --   --   --   --   --   --   --    [id number] Test received-See reflex to IDDL test SARS CoV2 (COVID-19) Virus RT-PCR  --   --   --   --   --   --   --    COVIDPCREXT  --   --   --   --   --  Negative Negative Negative    < > = values in this interval not displayed.       Respiratory virus (non-coronavirus-19) testing    Recent Labs   Lab Test 01/24/21  1822 01/24/21  1629 12/23/20  1102 02/22/18  0900 02/22/18  0900   RVSPEC  --  Bronchial Bronchial   < >  --    AFLU  --   --   --   --  Duplicate request*   IFLUA Not Detected Negative Negative   < >  --    INFZA  --   --   --   --  Negative   FLUAH1 Not Detected Negative Negative   < >  --    BC4484 Not Detected Negative Negative   < >  --    FLUAH3 Not Detected Negative Negative   < >  --    BFLU  --   --   --   --  Duplicate request*   IFLUB Not Detected Negative Negative   < >  --    INFZB  --   --   --   --  Negative   PIV1 Not Detected Negative Negative   < >  --    PIV2 Not Detected Negative Negative   < >  --    PIV3 Not Detected Negative Negative   < >  --    PIV4 Not Detected  --   --    < >  --    IRSV  --   --   --   --  Negative   HRVS  --  Negative Negative   < >  --    RSVA Not Detected Negative Negative   < >  --    RSVB Not Detected Negative Negative   < >  --    HMPV Not Detected Negative Negative   < >  --    ADVBE  --  Negative Negative   < >  --    ADVC  --  Negative Negative   < >  --    ADENOV Not Detected  --   --    <  >  --    CORONA Not Detected  --   --    < >  --     < > = values in this interval not displayed.       Log IU/mL of CMVQNT   Date Value Ref Range Status   02/25/2021 Not Calculated <2.1 [Log_IU]/mL Final   01/25/2021 <2.1 <2.1 [Log_IU]/mL Final   01/24/2021 2.4 (H) <2.1 [Log_IU]/mL Final   12/23/2020 2.8 (H) <2.1 [Log_IU]/mL Final   12/09/2020 <2.1 <2.1 [Log_IU]/mL Final   09/21/2020 <2.1 <2.1 [Log_IU]/mL Final   09/09/2020 Not Calculated <2.1 [Log_IU]/mL Final   03/09/2020 <2.1 <2.1 [Log_IU]/mL Final   11/11/2019 <2.1 <2.1 [Log_IU]/mL Final   10/24/2019 Not Calculated <2.1 [Log_IU]/mL Final   10/06/2019 Not Calculated <2.1 [Log_IU]/mL Final   09/12/2019 <2.1 <2.1 [Log_IU]/mL Final   06/05/2019 <2.1 <2.1 [Log_IU]/mL Final   03/07/2019 2.7 (H) <2.1 [Log_IU]/mL Final   01/17/2019 Not Calculated <2.1 [Log_IU]/mL Final   01/16/2019 <2.1 <2.1 [Log_IU]/mL Final   12/14/2018 2.7 (H) <2.1 [Log_IU]/mL Final   12/13/2018 <2.1 <2.1 [Log_IU]/mL Final   12/04/2018 <2.1 <2.1 [Log_IU]/mL Final   11/20/2018 3.2 (H) <2.1 [Log_IU]/mL Final   11/19/2018 Not Calculated <2.1 [Log_IU]/mL Final   10/29/2018 3.8 (H) <2.1 [Log_IU]/mL Final   10/09/2018 <2.1 <2.1 [Log_IU]/mL Final   09/10/2018 <2.1 <2.1 [Log_IU]/mL Final   09/04/2018 <2.1 <2.1 [Log_IU]/mL Final   08/20/2018 2.8 (H) <2.1 [Log_IU]/mL Final   08/02/2018 6.5 (H) <2.1 [Log_IU]/mL Final   08/01/2018 3.8 (H) <2.1 [Log_IU]/mL Final   07/10/2018 4.3 (H) <2.1 [Log_IU]/mL Final   07/10/2018 4.0 (H) <2.1 [Log_IU]/mL Final       No results found for: H6RES    EBV DNA Copies/mL   Date Value Ref Range Status   02/22/2021 75,709 (A) EBVNEG^EBV DNA Not Detected [Copies]/mL Final   01/25/2021 5,619 (A) EBVNEG^EBV DNA Not Detected [Copies]/mL Final   12/09/2020 10,686 (A) EBVNEG^EBV DNA Not Detected [Copies]/mL Final   09/09/2020 2,935 (A) EBVNEG^EBV DNA Not Detected [Copies]/mL Final   03/09/2020 8,918 (A) EBVNEG^EBV DNA Not Detected [Copies]/mL Final   02/19/2020 38,419 (A) EBVNEG^EBV DNA  Not Detected [Copies]/mL Final       BK Virus Result   Date Value Ref Range Status   08/07/2019 BK Virus DNA Not Detected BKNEG^BK Virus DNA Not Detected copies/mL Final       Again, thank you for allowing me to participate in the care of your patient.      Sincerely,    Vinicio Layton MD

## 2021-03-17 NOTE — RESULT ENCOUNTER NOTE
Armand Mcdonnell, your tacrolimus level was 10.1 at 12 hours on 3/15/21 which is within your goal range of 8-10. No dose change at this time. Please call the transplant office ([phone]) with any questions. Thanks, Mikayla

## 2021-03-18 ENCOUNTER — ALLIED HEALTH/NURSE VISIT (OUTPATIENT)
Dept: CARDIOLOGY | Facility: CLINIC | Age: 61
End: 2021-03-18
Attending: NURSE PRACTITIONER
Payer: COMMERCIAL

## 2021-03-18 DIAGNOSIS — I48.92 ATRIAL FLUTTER, UNSPECIFIED TYPE (H): ICD-10-CM

## 2021-03-21 PROCEDURE — 93248 EXT ECG>7D<15D REV&INTERPJ: CPT | Performed by: INTERNAL MEDICINE

## 2021-03-23 LAB
MYCOBACTERIUM SPEC CULT: NORMAL
MYCOBACTERIUM SPEC CULT: NORMAL
SPECIMEN SOURCE: NORMAL

## 2021-03-24 LAB
MYCOBACTERIUM SPEC CULT: NORMAL
MYCOBACTERIUM SPEC CULT: NORMAL
SPECIMEN SOURCE: NORMAL

## 2021-03-26 DIAGNOSIS — Z11.59 ENCOUNTER FOR SCREENING FOR OTHER VIRAL DISEASES: ICD-10-CM

## 2021-03-29 ENCOUNTER — TELEPHONE (OUTPATIENT)
Dept: TRANSPLANT | Facility: CLINIC | Age: 61
End: 2021-03-29

## 2021-03-29 DIAGNOSIS — Z94.2 LUNG REPLACED BY TRANSPLANT (H): ICD-10-CM

## 2021-03-29 PROCEDURE — 80197 ASSAY OF TACROLIMUS: CPT | Performed by: PHYSICIAN ASSISTANT

## 2021-03-29 NOTE — TELEPHONE ENCOUNTER
DATE:  3/29/2021   TIME OF RECEIPT FROM LAB:  1020  LAB TEST:  Alk Phos  LAB VALUE:  288  RESULTS GIVEN WITH READ-BACK TO :Mikayla Latham  TIME LAB VALUE REPORTED TO PROVIDER:   1022

## 2021-03-30 ENCOUNTER — TELEPHONE (OUTPATIENT)
Dept: TRANSPLANT | Facility: CLINIC | Age: 61
End: 2021-03-30

## 2021-03-30 DIAGNOSIS — Z94.2 LUNG REPLACED BY TRANSPLANT (H): Primary | ICD-10-CM

## 2021-03-30 LAB
TACROLIMUS BLD-MCNC: 16.4 UG/L (ref 5–15)
TME LAST DOSE: ABNORMAL H

## 2021-03-30 RX ORDER — TACROLIMUS 0.5 MG/1
0.5 CAPSULE ORAL 2 TIMES DAILY
Qty: 60 CAPSULE | Refills: 11 | Status: SHIPPED | OUTPATIENT
Start: 2021-03-30 | End: 2021-04-08

## 2021-03-30 NOTE — TELEPHONE ENCOUNTER
Tacrolimus level 16.4 at 12 hours, on 3/29/21.  Goal 8-10.   Current dose 1 mg in AM, 1 mg in PM    Dose changed to 0.5 mg in AM, 0.5 mg in PM   Recheck level next week in clinic.     Discussed with patient.

## 2021-04-02 ENCOUNTER — PRE VISIT (OUTPATIENT)
Dept: CARDIOLOGY | Facility: CLINIC | Age: 61
End: 2021-04-02

## 2021-04-06 ENCOUNTER — TELEPHONE (OUTPATIENT)
Dept: TRANSPLANT | Facility: CLINIC | Age: 61
End: 2021-04-06

## 2021-04-06 DIAGNOSIS — N18.6 END STAGE RENAL FAILURE ON DIALYSIS (H): ICD-10-CM

## 2021-04-06 DIAGNOSIS — Z99.2 END STAGE RENAL FAILURE ON DIALYSIS (H): ICD-10-CM

## 2021-04-06 DIAGNOSIS — Z94.2 S/P LUNG TRANSPLANT (H): ICD-10-CM

## 2021-04-06 RX ORDER — PREDNISONE 2.5 MG/1
2.5 TABLET ORAL EVERY EVENING
Qty: 30 TABLET | Refills: 11 | Status: SHIPPED | OUTPATIENT
Start: 2021-04-06 | End: 2022-05-10

## 2021-04-06 RX ORDER — PREDNISONE 5 MG/1
5 TABLET ORAL EVERY MORNING
Qty: 30 TABLET | Refills: 11 | Status: SHIPPED | OUTPATIENT
Start: 2021-04-06 | End: 2022-05-10

## 2021-04-06 RX ORDER — SULFAMETHOXAZOLE AND TRIMETHOPRIM 400; 80 MG/1; MG/1
1 TABLET ORAL
Qty: 13 TABLET | Refills: 11 | Status: SHIPPED | OUTPATIENT
Start: 2021-04-07 | End: 2022-05-03

## 2021-04-06 RX ORDER — VIT B COMP NO.3/FOLIC/C/BIOTIN 1 MG-60 MG
1 TABLET ORAL DAILY
Qty: 30 TABLET | Refills: 11 | Status: SHIPPED | OUTPATIENT
Start: 2021-04-06 | End: 2022-04-26

## 2021-04-06 NOTE — TELEPHONE ENCOUNTER
Patient Call: Medication Refill  Route to LPN  Instruct the patient to first contact their pharmacy. If they have called their pharmacy and require further assistance, route to LPN.      predniSONE (DELTASONE) 5 MG tablet  Batrim   multivitamin RENAL (RENAVITE RX/NEPHROVITE) 1 MG tablet    [id number] - [place], [place] - [address] [facility] [address]    When will the patient be out of this medication?: Less than 24 hours ([facility] LPN, then page if no answer)

## 2021-04-06 NOTE — TELEPHONE ENCOUNTER
Confirmed time for bronch this Friday as it doesn't show up in MyChart.   Refills sent to pharmacy as requested.

## 2021-04-07 NOTE — PROGRESS NOTES
[facility]  [facility]  Pulmonary Transplant Follow Up Visit  April 8, 2021    Reason for Visit  Kecia Blue is a 60 year old year old female who is being seen for RECHECK (lung tx follow up)               Lung Tx Summary:     Transplants:  3/1/2018 (Lung), Postoperative day:  1133    Kecia Blue is a 60 year old year old female who underwent double lung transplant on 3/1/2018 (Lung), currently postoperative day:  1133, course complicated by HTN, ESRD on dialysis, right mainstem bronchial stenosis, aspergillus empyema, and CMV viremia.          Interval Histories:     April 8, 2021  Productive cough in the mornings, clear, easy to expectorate. Mild sinus drainage, no headaches, no sore throat. Chancre sore on left side of her tongue which is self healing. She's been home from rehab for about a month, she's about to get started in pulm rehab and is excited for that as well. She denies any wheezing, sob, or chest pain/pressure. She is still feeling a little weak overall compared to her baseline, but is looking forward to starting pulm rehab. She denies any wheezing, erythema or drainage from her trach site and it appears well healed and closed. She denies any nausea/vomiting/acid reflux, has usually 2 loose stools a day which is her baseline, denies early satiety. She hasn't used her GJ tube in a month and would like it to be removed. Has some occasional LE edema on dialysis days before dialysis which resolves after dialysis.    Exercise  She is planning on starting pulm rehab in the next week or two    Interval Medical Events  Admitted on 1/22 with ARDS thought 2/2 PJP, trache placed on 2/12, PEGJ on 2/16, dilation of RMSB on 2/19, and transferred to acute care rehab on 2/25. Now on RA     The patient was seen and examined by Yenni Graham MD     A complete ROS was otherwise negative except as noted in the HPI.           Medications:   All medications  were reviewed at this visit    Tylenol 325 q 4 prn  Mucomyst 10% 2mL BID  Flonase prn  Nephrovite MV  Protonix 40 mg daily  Posaconazole 300 mg daily  Prednisone 5 / 2.5  Bactrim 400-80 MWF  Tacrolimus 0.5 mg q 12  Metoprolol tartrate 25 mg bid          Allergies:     No Known Allergies         Past Medical and Past Surgical History:     Past Medical History:   Diagnosis Date     Acute on chronic respiratory failure with hypoxia (H) 02/21/2018     Anisocoria      Antisynthetase syndrome (H) 2014     Anxiety      Aspergillus (H)      Aspergillus pneumonia (H) 11/20/2020     Benign essential hypertension      C. difficile colitis      Cytomegalovirus (CMV) viremia (H)      Dermatomyositis (H)      Dysplasia of cervix, low grade (ESTRADA 1)      EBV (Franklin-Barr virus) viremia      ESRD (end stage renal disease) on dialysis (H)      ILD (interstitial lung disease) (H)      Lung replaced by transplant (H)      Osteopenia      Paroxysmal atrial fibrillation (H)      Pneumocystis jiroveci pneumonia (H)      PONV (postoperative nausea and vomiting)      Pulmonary hypertension (H)      Raynaud's disease      Seronegative rheumatoid arthritis (H)        Past Surgical History:   Procedure Laterality Date     BRONCHOSCOPY (RIGID OR FLEXIBLE), DIAGNOSTIC N/A 4/10/2018    Procedure: COMBINED BRONCHOSCOPY (RIGID OR FLEXIBLE), LAVAGE;;  Surgeon: Mariposa Donohue MD;  Location: [facility]     BRONCHOSCOPY (RIGID OR FLEXIBLE), DIAGNOSTIC N/A 12/23/2020    Procedure: BRONCHOSCOPY, WITH BRONCHOALVEOLAR LAVAGE;  Surgeon: Uri Bass MD;  Location: [facility]     BRONCHOSCOPY (RIGID OR FLEXIBLE), DILATE BRONCHUS / TRACHEA N/A 10/11/2018    Procedure: BRONCHOSCOPY (RIGID OR FLEXIBLE), DILATE BRONCHUS / TRACHEA;  Flexible And Rigid Bronchoscopy and Dilation;  Surgeon: Wilber Lin MD;  [facility]     BRONCHOSCOPY FLEXIBLE N/A 3/13/2018    Procedure: BRONCHOSCOPY FLEXIBLE;  Flexible Bronchoscopy ;  Surgeon: Gissell Sanchez  MD Sarah;  Location: [facility]     BRONCHOSCOPY FLEXIBLE N/A 5/9/2018    Procedure: BRONCHOSCOPY FLEXIBLE;;  Surgeon: Wilber Lin MD;  [facility]: [facility]     BRONCHOSCOPY FLEXIBLE AND RIGID N/A 9/10/2018    Procedure: BRONCHOSCOPY FLEXIBLE AND RIGID;  Flexible and Rigid Bronchoscopy with Balloon Dilation, tissue debulking with cryo, and Right mainstem bronchus stent placement;  Surgeon: Wilber Lin MD;  [facility]     BRONCHOSCOPY RIGID N/A 6/6/2018    Procedure: BRONCHOSCOPY RIGID;;  Surgeon: Lopez Macias MD;  Location: [facility]     BRONCHOSCOPY, DILATE BRONCHUS, STENT BRONCHUS, COMBINED N/A 6/11/2018    Procedure: COMBINED BRONCHOSCOPY, DILATE BRONCHUS, STENT BRONCHUS;  Flexible Bronchoscopy, Balloon Dilation, Bronchial Washings;  Surgeon: Wilber Lin MD;  [facility]     BRONCHOSCOPY, DILATE BRONCHUS, STENT BRONCHUS, COMBINED Right 7/10/2018    Procedure: COMBINED BRONCHOSCOPY, DILATE BRONCHUS, STENT BRONCHUS;  Flexible Bronchoscopy, Balloon Dilation, Bronchial Washings  ;  Surgeon: Wilber Lin MD;  [facility]     BRONCHOSCOPY, DILATE BRONCHUS, STENT BRONCHUS, COMBINED N/A 8/2/2018    Procedure: COMBINED BRONCHOSCOPY, DILATE BRONCHUS, STENT BRONCHUS;  Flexible Bronchoscopy, Bronchial Washings, Balloon Dilation;  Surgeon: Wilber Lin MD;  [facility]     BRONCHOSCOPY, DILATE BRONCHUS, STENT BRONCHUS, COMBINED N/A 8/20/2018    Procedure: COMBINED BRONCHOSCOPY, DILATE BRONCHUS, STENT BRONCHUS;  Flexible Bronchoscopy, Balloon Dilation;  Surgeon: Wilber Lin MD;  [facility]     BRONCHOSCOPY, DILATE BRONCHUS, STENT BRONCHUS, COMBINED N/A 10/29/2018    Procedure: Flexible Bronchoscopy, Balloon Dilation, Stent Revision, Airway Examination And Therapeutic Suctioning, Cyro Tumor Debulking;  Surgeon: Wilber Lin MD;  [facility]     BRONCHOSCOPY, DILATE BRONCHUS, STENT BRONCHUS, COMBINED N/A 11/20/2018    Procedure:  Rigid Bronchoscopy, Stent Removal and dilitation;  Surgeon: Wilber Lin MD;  [facility]     BRONCHOSCOPY, DILATE BRONCHUS, STENT BRONCHUS, COMBINED N/A 12/14/2018    Procedure: Flexible And Rigid Bronchoscopy, Balloon Dilation, Bronchial Washing;  Surgeon: Wibler Lin MD;  [facility]     BRONCHOSCOPY, DILATE BRONCHUS, STENT BRONCHUS, COMBINED N/A 1/17/2019    Procedure: Flexible And Rigid Bronchoscopy, Balloon Dilation.;  Surgeon: Wilber Lin MD;  [facility]     BRONCHOSCOPY, DILATE BRONCHUS, STENT BRONCHUS, COMBINED N/A 3/7/2019    Procedure: Flexible and Rigid Bronchoscopy, Bronchial Washing, Balloon Dilation;  Surgeon: Wilber Lin MD;  [facility]     BRONCHOSCOPY, DILATE BRONCHUS, STENT BRONCHUS, COMBINED N/A 6/6/2019    Procedure: Rigid and Flexible Bronchoscopy, Balloon Dilation;  Surgeon: Wilber Lin MD;  [facility]     BRONCHOSCOPY, DILATE BRONCHUS, STENT BRONCHUS, COMBINED N/A 10/11/2019    Procedure: Flexible and Rigid Bronchoscopy, Balloon Dilation, Bronchoalveolar Lagave;  Surgeon: Wilber Lin MD;  [facility]     BRONCHOSCOPY, DILATE BRONCHUS, STENT BRONCHUS, COMBINED N/A 2/19/2021    Procedure: BRONCHOSCOPY, flexible, airway dilation, and bronchial wash;  Surgeon: Wilber Lin MD;  [facility]     CV RIGHT HEART CATH MEASUREMENTS RECORDED N/A 3/10/2020    Procedure: CV RIGHT HEART CATH;  Surgeon: Wai Garcia MD;  Location:  [facility]ATH [facility]     ENT SURGERY      tonsillectomy as a child     ESOPHAGOSCOPY, GASTROSCOPY, DUODENOSCOPY (EGD), COMBINED N/A 10/29/2018    Procedure: COMBINED ESOPHAGOSCOPY, GASTROSCOPY, DUODENOSCOPY (EGD) with biopsies and polypectomy;  Surgeon: Chente Bloom MD;  [facility]     INSERT EXTRACORPORAL MEMBRANE OXYGENATOR ADULT (OUTSIDE OR) N/A 2/27/2018    Procedure: INSERT EXTRACORPORAL MEMBRANE OXYGENATOR ADULT (OUTSIDE OR);  INSERT  EXTRACORPORAL MEMBRANE OXYGENATOR ADULT (OUTSIDE OR) ;  Surgeon: Toby Hernandez MD;  [facility]     IR CVC TUNNEL PLACEMENT > 5 YRS OF AGE  10/25/2019     IR GASTRO JEJUNOSTOMY TUBE PLACEMENT  2/16/2021     IR PARACENTESIS  1/8/2020     IR THORACENTESIS  9/13/2019     IR TRANSCATHETER BIOPSY  1/8/2020     no prior surgery       REMOVE EXTRACORPORAL MEMBRANE OXYGENATOR ADULT N/A 3/3/2018    Procedure: REMOVE EXTRACORPORAL MEMBRANE OXYGENATOR ADULT;  Removal of Right Femoral Venous and Right Axillary Arterial Extracorporeal Membrane Oxygenator;  Surgeon: Toyb Hernandez MD;  [facility]     TRANSPLANT LUNG RECIPIENT SINGLE X2 Bilateral 3/1/2018    Procedure: TRANSPLANT LUNG RECIPIENT SINGLE X2;  Median Sternotomy, Extracorporeal Membrane Oxygenator, bilateral sequential lung transplant;  Surgeon: Toby Hernandez MD;  [facility]             Social History:     Social History     Socioeconomic History     Marital status:      Spouse name: Not on file     Number of children: Not on file     Years of education: Not on file     Highest education level: Not on file   Occupational History     Not on file   Social Needs     Financial resource strain: Not on file     Food insecurity     Worry: Not on file     Inability: Not on file     Transportation needs     Medical: Not on file     Non-medical: Not on file   Tobacco Use     Smoking status: Never Smoker     Smokeless tobacco: Never Used   Substance and Sexual Activity     Alcohol use: No     Alcohol/week: 1.0 standard drinks     Types: 1 Glasses of wine per week     Drug use: No     Sexual activity: Not on file   Lifestyle     Physical activity     Days per week: Not on file     Minutes per session: Not on file     Stress: Not on file   Relationships     Social connections     Talks on phone: Not on file     Gets together: Not on file     Attends Faith service: Not on file     Active member of club or organization:  Not on file     Attends meetings of clubs or organizations: Not on file     Relationship status: Not on file     Intimate partner violence     Fear of current or ex partner: Not on file     Emotionally abused: Not on file     Physically abused: Not on file     Forced sexual activity: Not on file   Other Topics Concern     Parent/sibling w/ CABG, MI or angioplasty before 65F 55M? No   Social History Narrative    3/6/2019 - Lives with . Has three daughters. Four grandchildren (two ). No pets. Travelled previously to [facility]. Has visited [place] several times.      Social Updates:          Rejection and Infection History     Rejection Hx    DATE INDICATION  PATH BAL/MICRO TREATMENT                Infectious Hx           Exam:     BP (!) 145/78   Pulse 85   Temp 97.9  F (36.6  C)   Wt 52.2 kg (115 lb)   LMP 2014 (Exact Date)   SpO2 100%   BMI 20.37 kg/m    Body mass index is 20.37 kg/m .    GENERAL APPEARANCE: Well developed, thin, alert, and in no apparent distress.  EYES: PERRL, EOMI  HENT: Nasal mucosa with no edema and no hyperemia. No nasal polyps.  EARS: Canals clear, TMs normal  MOUTH: Oral mucosa is moist, without any lesions, no tonsillar enlargement, no oropharyngeal exudate.  NECK: supple, no masses, no thyromegaly.  LYMPHATICS: No significant axillary, cervical, or supraclavicular nodes.  RESP: normal percussion, good air flow throughout.  No crackles. No rhonchi. No wheezes.  CV: Normal S1, S2, regular rhythm, normal rate. No murmur.  No rub. No gallop. No LE edema.   ABDOMEN:  Bowel sounds normal, soft, nontender, no HSM or masses.   MS: extremities normal. No clubbing. No cyanosis.  SKIN: no rash on limited exam  NEURO: Mentation intact, speech normal, normal strength and tone, normal gait and stance, no significant tremor at rest  PSYCH: mentation appears normal. and affect normal/bright         Data:     Results:  Recent Results (from the past 168 hour(s))   INR     Collection Time: 04/08/21  7:22 AM   Result Value Ref Range    INR 1.04 0.86 - 1.14   CBC with platelets differential    Collection Time: 04/08/21  7:22 AM   Result Value Ref Range    WBC 6.3 4.0 - 11.0 10e9/L    RBC Count 2.98 (L) 3.8 - 5.2 10e12/L    Hemoglobin 9.7 (L) 11.7 - 15.7 g/dL    Hematocrit 29.9 (L) 35.0 - 47.0 %     78 - 100 fl    MCH 32.6 26.5 - 33.0 pg    MCHC 32.4 31.5 - 36.5 g/dL    RDW 15.4 (H) 10.0 - 15.0 %    Platelet Count 178 150 - 450 10e9/L    Diff Method Automated Method     % Neutrophils 75.1 %    % Lymphocytes 11.0 %    % Monocytes 11.8 %    % Eosinophils 1.1 %    % Basophils 0.5 %    % Immature Granulocytes 0.5 %    Nucleated RBCs 0 0 /100    Absolute Neutrophil 4.8 1.6 - 8.3 10e9/L    Absolute Lymphocytes 0.7 (L) 0.8 - 5.3 10e9/L    Absolute Monocytes 0.8 0.0 - 1.3 10e9/L    Absolute Eosinophils 0.1 0.0 - 0.7 10e9/L    Absolute Basophils 0.0 0.0 - 0.2 10e9/L    Abs Immature Granulocytes 0.0 0 - 0.4 10e9/L    Absolute Nucleated RBC 0.0    Basic metabolic panel    Collection Time: 04/08/21  7:22 AM   Result Value Ref Range    Sodium 138 133 - 144 mmol/L    Potassium 3.7 3.4 - 5.3 mmol/L    Chloride 101 94 - 109 mmol/L    Carbon Dioxide 32 20 - 32 mmol/L    Anion Gap 5 3 - 14 mmol/L    Glucose 138 (H) 70 - 99 mg/dL    Urea Nitrogen 22 7 - 30 mg/dL    Creatinine 2.49 (H) 0.52 - 1.04 mg/dL    GFR Estimate 21 (L) >60 mL/min/[1.73_m2]    GFR Estimate If Black 24 (L) >60 mL/min/[1.73_m2]    Calcium 7.9 (L) 8.5 - 10.1 mg/dL   Magnesium    Collection Time: 04/08/21  7:22 AM   Result Value Ref Range    Magnesium 1.5 (L) 1.6 - 2.3 mg/dL   General PFT Lab (Please always keep checked)    Collection Time: 04/08/21  7:34 AM   Result Value Ref Range    FVC-Pred 3.23 L    FVC-Pre 1.41 L    FVC-%Pred-Pre 43 %    FEV1-Pre 1.09 L    FEV1-%Pred-Pre 42 %    FEV1FVC-Pred 80 %    FEV1FVC-Pre 77 %    FEFMax-Pred 6.40 L/sec    FEFMax-Pre 2.34 L/sec    FEFMax-%Pred-Pre 36 %    FEF2575-Pred 2.36 L/sec     FEF2575-Pre 0.99 L/sec    QMH4055-%Pred-Pre 42 %    ExpTime-Pre 5.08 sec    FIFMax-Pre 2.47 L/sec    FEV1FEV6-Pred 81 %    FEV1FEV6-Pre 81 %         Date Place TLC (%) FVC (%) FEV1 (%) FEV1/FVC DLCO (%) Note   4/8/21    1.41 43 1.09 42 77                                                  Best Post Txp PFTs: Baseline: FEV1 1.63 FVC: 2.24  6/5/19: FEV1 1.65  8/7/19: FEV1 1.60   6/5/19: FVC 2.26L  8/7/19: FVC 2.22L   Results as noted above.    April 8, 2021  PFT Interpretation:  Severe obstructive ventilatory defect.  Increased from previous.  Below recent best.   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 Her FEV1 is 67% her baseline today, but I suspect this may be her recovering some function after the insult from ARDS 2/2 PJP. She's functionally feeling very well and has very minimal cough to no cough.   - Repeat PFTs in next month  - If still no improvement would start on azithromycin prophylaxis and singulair  - Bronch    Immunosuppression:  Tacrolimus, goal 8-10  PRednisone 5/ 2.5  ?Not on third drug suspect due to aspergillus empyema       Prophylaxis:   PJP:  Bactrim MWF  CMV: D /R, trending q 2 weeks, VGCV off  EBV: D /R Trending q 4 weeks, last on 3/22/21  Fungal:     Recent ARDS 2/2 PJP: Was off prophylaxis. Admitted on 1/22 with ARDS thought 2/2 PJP, trache placed on 2/12, PEGJ on 2/16, dilation of RMSB on 2/19, and transferred to acute care rehab on 2/25. Now on 1L of O2 with exertion, trach decannulated on 3/4. Finished a  course of bactrim x 21 days.   - Continue supplemental O2  - Albuterol QID  - Mucomyst BID  - Pulmicort BID  - CXR looks overall improved with small rightp lueral effusion, unclear if loculated left pleural effusion is still present  - F/U outpatient repeat bronchoscopy tomorrow, if no significant secretions will stop her mucomyst/albuterol/pulmicort    Left Sided Aspergillus Empyema: Diagnosed in 10/2019. Antimicrobial beads placed. She has had a persistent loculated pleural effusion on the left. Discussed with Dr. Marvin   - Posaconazole 300 mg daily  - Transplant ID follow up  - May need to repeat pigtail and consider pleurodesis of the right chest    ESRD: Was on iHD M/Th prior to most recent hospitalization. Access is AVF with partial graft s/p clot removal by IR on 3/2/21. Continues now on iHD T/TH/Sa.   - Not on diuretics  - Restarted her slow mag today with mag of 1.5, will trend closely and dose on non dialysis days    Maintenance:  Derm Exam: Due Annually  Colon Ca Screening:  DEXA: Overdue, last in 2015 from what I can tell, repeat DEXA this year  Imms:         CHANGES TODAY  - Start slow mag 3 tabs on non dialysis days    - Can consider stopping mucomyst/albuterol/budesonide if bronch is okay without significant mucus plugging    - CT chest to reassess pleural effusion    - Will discuss with  after CT scan results and potential chest tube placement and drainage of left pleural effusion with potential pleurodesis/obliteration of the space and the hope that we could take her off posaconazole     - Discussed idea of renal transplant, would ideally like to have aspergillus empyema taken care of given that she would likely need 3 drug immunosuppression again for a year     - Remove GJ tube today    - DEXA due this year      I personally spent 60 minutes reviewing the patient's chart, discussing her case with Dr. Marvin, interviewing her and documenting on 4/8/21.      Yenni Graham MD  [facility]  [facility]   Pulmonary Transplant   Pre Transplant Coordinator: Erna Lopez  Ph: [phone]  Post Transplant Coordinator: Gaye Castaneda  Fax: [phone]  Ph: [phone]

## 2021-04-08 ENCOUNTER — ANESTHESIA EVENT (OUTPATIENT)
Dept: SURGERY | Facility: CLINIC | Age: 61
End: 2021-04-08
Payer: COMMERCIAL

## 2021-04-08 ENCOUNTER — ANCILLARY PROCEDURE (OUTPATIENT)
Dept: CT IMAGING | Facility: CLINIC | Age: 61
End: 2021-04-08
Attending: INTERNAL MEDICINE
Payer: COMMERCIAL

## 2021-04-08 ENCOUNTER — TELEPHONE (OUTPATIENT)
Dept: TRANSPLANT | Facility: CLINIC | Age: 61
End: 2021-04-08

## 2021-04-08 ENCOUNTER — ANCILLARY PROCEDURE (OUTPATIENT)
Dept: GENERAL RADIOLOGY | Facility: CLINIC | Age: 61
End: 2021-04-08
Attending: PHYSICIAN ASSISTANT
Payer: COMMERCIAL

## 2021-04-08 ENCOUNTER — HOSPITAL ENCOUNTER (OUTPATIENT)
Facility: CLINIC | Age: 61
Discharge: OTHER INSTITUTION | End: 2021-04-08
Attending: INTERNAL MEDICINE | Admitting: INTERNAL MEDICINE
Payer: COMMERCIAL

## 2021-04-08 ENCOUNTER — OFFICE VISIT (OUTPATIENT)
Dept: TRANSPLANT | Facility: CLINIC | Age: 61
End: 2021-04-08
Attending: PHYSICIAN ASSISTANT
Payer: COMMERCIAL

## 2021-04-08 VITALS
HEART RATE: 85 BPM | OXYGEN SATURATION: 100 % | BODY MASS INDEX: 20.37 KG/M2 | WEIGHT: 115 LBS | TEMPERATURE: 97.9 F | DIASTOLIC BLOOD PRESSURE: 78 MMHG | SYSTOLIC BLOOD PRESSURE: 145 MMHG

## 2021-04-08 DIAGNOSIS — J90 PLEURAL EFFUSION: ICD-10-CM

## 2021-04-08 DIAGNOSIS — E83.42 HYPOMAGNESEMIA: ICD-10-CM

## 2021-04-08 DIAGNOSIS — Z94.2 S/P LUNG TRANSPLANT (H): Primary | ICD-10-CM

## 2021-04-08 DIAGNOSIS — Z79.899 ENCOUNTER FOR LONG-TERM (CURRENT) USE OF HIGH-RISK MEDICATION: ICD-10-CM

## 2021-04-08 DIAGNOSIS — Z94.2 LUNG REPLACED BY TRANSPLANT (H): ICD-10-CM

## 2021-04-08 DIAGNOSIS — Z11.59 ENCOUNTER FOR SCREENING FOR OTHER VIRAL DISEASES: ICD-10-CM

## 2021-04-08 DIAGNOSIS — Z94.2 S/P LUNG TRANSPLANT (H): ICD-10-CM

## 2021-04-08 DIAGNOSIS — I10 BENIGN ESSENTIAL HYPERTENSION: ICD-10-CM

## 2021-04-08 LAB
ANION GAP SERPL CALCULATED.3IONS-SCNC: 5 MMOL/L (ref 3–14)
BASOPHILS # BLD AUTO: 0 10E9/L (ref 0–0.2)
BASOPHILS NFR BLD AUTO: 0.5 %
BUN SERPL-MCNC: 22 MG/DL (ref 7–30)
CALCIUM SERPL-MCNC: 7.9 MG/DL (ref 8.5–10.1)
CHLORIDE SERPL-SCNC: 101 MMOL/L (ref 94–109)
CO2 SERPL-SCNC: 32 MMOL/L (ref 20–32)
CREAT SERPL-MCNC: 2.49 MG/DL (ref 0.52–1.04)
DIFFERENTIAL METHOD BLD: ABNORMAL
EOSINOPHIL # BLD AUTO: 0.1 10E9/L (ref 0–0.7)
EOSINOPHIL NFR BLD AUTO: 1.1 %
ERYTHROCYTE [DISTWIDTH] IN BLOOD BY AUTOMATED COUNT: 15.4 % (ref 10–15)
EXPTIME-PRE: 5.08 SEC
FEF2575-%PRED-PRE: 42 %
FEF2575-PRE: 0.99 L/SEC
FEF2575-PRED: 2.36 L/SEC
FEFMAX-%PRED-PRE: 36 %
FEFMAX-PRE: 2.34 L/SEC
FEFMAX-PRED: 6.4 L/SEC
FEV1-%PRED-PRE: 42 %
FEV1-PRE: 1.09 L
FEV1FEV6-PRE: 81 %
FEV1FEV6-PRED: 81 %
FEV1FVC-PRE: 77 %
FEV1FVC-PRED: 80 %
FIFMAX-PRE: 2.47 L/SEC
FVC-%PRED-PRE: 43 %
FVC-PRE: 1.41 L
FVC-PRED: 3.23 L
GFR SERPL CREATININE-BSD FRML MDRD: 21 ML/MIN/{1.73_M2}
GLUCOSE SERPL-MCNC: 138 MG/DL (ref 70–99)
HCT VFR BLD AUTO: 29.9 % (ref 35–47)
HGB BLD-MCNC: 9.7 G/DL (ref 11.7–15.7)
IMM GRANULOCYTES # BLD: 0 10E9/L (ref 0–0.4)
IMM GRANULOCYTES NFR BLD: 0.5 %
INR PPP: 1.04 (ref 0.86–1.14)
LABORATORY COMMENT REPORT: NORMAL
LYMPHOCYTES # BLD AUTO: 0.7 10E9/L (ref 0.8–5.3)
LYMPHOCYTES NFR BLD AUTO: 11 %
MAGNESIUM SERPL-MCNC: 1.5 MG/DL (ref 1.6–2.3)
MCH RBC QN AUTO: 32.6 PG (ref 26.5–33)
MCHC RBC AUTO-ENTMCNC: 32.4 G/DL (ref 31.5–36.5)
MCV RBC AUTO: 100 FL (ref 78–100)
MONOCYTES # BLD AUTO: 0.8 10E9/L (ref 0–1.3)
MONOCYTES NFR BLD AUTO: 11.8 %
NEUTROPHILS # BLD AUTO: 4.8 10E9/L (ref 1.6–8.3)
NEUTROPHILS NFR BLD AUTO: 75.1 %
NRBC # BLD AUTO: 0 10*3/UL
NRBC BLD AUTO-RTO: 0 /100
PLATELET # BLD AUTO: 178 10E9/L (ref 150–450)
POTASSIUM SERPL-SCNC: 3.7 MMOL/L (ref 3.4–5.3)
RBC # BLD AUTO: 2.98 10E12/L (ref 3.8–5.2)
SARS-COV-2 RNA RESP QL NAA+PROBE: NEGATIVE
SARS-COV-2 RNA RESP QL NAA+PROBE: NORMAL
SODIUM SERPL-SCNC: 138 MMOL/L (ref 133–144)
SPECIMEN SOURCE: NORMAL
SPECIMEN SOURCE: NORMAL
TACROLIMUS BLD-MCNC: 6.1 UG/L (ref 5–15)
TME LAST DOSE: NORMAL H
WBC # BLD AUTO: 6.3 10E9/L (ref 4–11)

## 2021-04-08 PROCEDURE — U0005 INFEC AGEN DETEC AMPLI PROBE: HCPCS | Mod: 90 | Performed by: PATHOLOGY

## 2021-04-08 PROCEDURE — 80048 BASIC METABOLIC PNL TOTAL CA: CPT | Performed by: PATHOLOGY

## 2021-04-08 PROCEDURE — 85025 COMPLETE CBC W/AUTO DIFF WBC: CPT | Performed by: PATHOLOGY

## 2021-04-08 PROCEDURE — 83735 ASSAY OF MAGNESIUM: CPT | Performed by: PATHOLOGY

## 2021-04-08 PROCEDURE — 71250 CT THORAX DX C-: CPT | Mod: GC | Performed by: RADIOLOGY

## 2021-04-08 PROCEDURE — 94375 RESPIRATORY FLOW VOLUME LOOP: CPT | Performed by: PHYSICIAN ASSISTANT

## 2021-04-08 PROCEDURE — U0003 INFECTIOUS AGENT DETECTION BY NUCLEIC ACID (DNA OR RNA); SEVERE ACUTE RESPIRATORY SYNDROME CORONAVIRUS 2 (SARS-COV-2) (CORONAVIRUS DISEASE [COVID-19]), AMPLIFIED PROBE TECHNIQUE, MAKING USE OF HIGH THROUGHPUT TECHNOLOGIES AS DESCRIBED BY CMS-2020-01-R: HCPCS | Mod: 90 | Performed by: PATHOLOGY

## 2021-04-08 PROCEDURE — G0463 HOSPITAL OUTPT CLINIC VISIT: HCPCS

## 2021-04-08 PROCEDURE — 99215 OFFICE O/P EST HI 40 MIN: CPT | Mod: 25 | Performed by: INTERNAL MEDICINE

## 2021-04-08 PROCEDURE — 36415 COLL VENOUS BLD VENIPUNCTURE: CPT | Performed by: PATHOLOGY

## 2021-04-08 PROCEDURE — 71046 X-RAY EXAM CHEST 2 VIEWS: CPT | Performed by: RADIOLOGY

## 2021-04-08 PROCEDURE — G0463 HOSPITAL OUTPT CLINIC VISIT: HCPCS | Mod: 27

## 2021-04-08 PROCEDURE — 80197 ASSAY OF TACROLIMUS: CPT | Mod: 90 | Performed by: PATHOLOGY

## 2021-04-08 PROCEDURE — 85610 PROTHROMBIN TIME: CPT | Performed by: PATHOLOGY

## 2021-04-08 RX ORDER — BUDESONIDE 0.5 MG/2ML
0.5 INHALANT ORAL 2 TIMES DAILY
Qty: 2 ML | Refills: 3 | COMMUNITY
Start: 2021-04-08 | End: 2021-06-10

## 2021-04-08 RX ORDER — TACROLIMUS 1 MG/1
1 CAPSULE ORAL EVERY EVENING
Qty: 30 CAPSULE | Refills: 11 | Status: ON HOLD | OUTPATIENT
Start: 2021-04-08 | End: 2021-06-11

## 2021-04-08 RX ORDER — ALBUTEROL SULFATE 0.83 MG/ML
2.5 SOLUTION RESPIRATORY (INHALATION) 2 TIMES DAILY
Qty: 360 ML | Refills: 0 | COMMUNITY
Start: 2021-04-08 | End: 2021-06-10

## 2021-04-08 RX ORDER — MAGNESIUM CHLORIDE 71.5 G/G
3 TABLET ORAL
Qty: 90 TABLET | Refills: 3 | COMMUNITY
Start: 2021-04-08 | End: 2023-01-01

## 2021-04-08 RX ORDER — METOPROLOL TARTRATE 25 MG/1
25 TABLET, FILM COATED ORAL 2 TIMES DAILY
Qty: 60 TABLET | Refills: 0 | COMMUNITY
Start: 2021-04-08 | End: 2021-05-24

## 2021-04-08 RX ORDER — TACROLIMUS 0.5 MG/1
0.5 CAPSULE ORAL EVERY MORNING
Qty: 30 CAPSULE | Refills: 11 | Status: ON HOLD | OUTPATIENT
Start: 2021-04-08 | End: 2021-06-11

## 2021-04-08 ASSESSMENT — ENCOUNTER SYMPTOMS: DYSRHYTHMIAS: 1

## 2021-04-08 NOTE — TELEPHONE ENCOUNTER
Tacrolimus level 6.1 at 12 hours, on 4/8/21.  Goal 8-10.   Current dose 0.5 mg in AM, 0.5 mg in PM    Dose changed to 0.5 mg in AM, 1 mg in PM   Recheck level in 7-14 days.     Discussed with patient.

## 2021-04-08 NOTE — LETTER
4/8/2021         RE: Kecia Blue  [address] [facility] [id number]        Dear Colleague,    Thank you for referring your patient, Kecia Blue, to the [facility]. Please see a copy of my visit note below.    [facility]  Pulmonary Transplant Follow Up Visit  April 8, 2021    Reason for Visit  Kecia Blue is a 60 year old year old female who is being seen for RECHECK (lung tx follow up)           Lung Tx Summary:     Transplants:  3/1/2018 (Lung), Postoperative day:  1133    Kecia Blue is a 60 year old year old female who underwent double lung transplant on 3/1/2018 (Lung), currently postoperative day:  1133, course complicated by HTN, ESRD on dialysis, right mainstem bronchial stenosis, aspergillus empyema, and CMV viremia.          Interval Histories:     April 8, 2021  Productive cough in the mornings, clear, easy to expectorate. Mild sinus drainage, no headaches, no sore throat. Chancre sore on left side of her tongue which is self healing. She's been home from rehab for about a month, she's about to get started in pulm rehab and is excited for that as well. She denies any wheezing, sob, or chest pain/pressure. She is still feeling a little weak overall compared to her baseline, but is looking forward to starting pulm rehab. She denies any wheezing, erythema or drainage from her trach site and it appears well healed and closed. She denies any nausea/vomiting/acid reflux, has usually 2 loose stools a day which is her baseline, denies early satiety. She hasn't used her GJ tube in a month and would like it to be removed. Has some occasional LE edema on dialysis days before dialysis which resolves after dialysis.    Exercise  She is planning on starting pulm rehab in the next week or two    Interval Medical Events  Admitted on 1/22 with ARDS thought 2/2 PJP, trache placed on 2/12, PEGJ on  2/16, dilation of RMSB on 2/19, and transferred to acute care rehab on 2/25. Now on RA     The patient was seen and examined by Yenni Graham MD     A complete ROS was otherwise negative except as noted in the HPI.           Medications:   All medications were reviewed at this visit    Tylenol 325 q 4 prn  Mucomyst 10% 2mL BID  Flonase prn  Nephrovite MV  Protonix 40 mg daily  Posaconazole 300 mg daily  Prednisone 5 / 2.5  Bactrim 400-80 MWF  Tacrolimus 0.5 mg q 12  Metoprolol tartrate 25 mg bid          Allergies:     No Known Allergies         Past Medical and Past Surgical History:     Past Medical History:   Diagnosis Date     Acute on chronic respiratory failure with hypoxia (H) 02/21/2018     Anisocoria      Antisynthetase syndrome (H) 2014     Anxiety      Aspergillus (H)      Aspergillus pneumonia (H) 11/20/2020     Benign essential hypertension      C. difficile colitis      Cytomegalovirus (CMV) viremia (H)      Dermatomyositis (H)      Dysplasia of cervix, low grade (ESTRADA 1)      EBV (Franklin-Barr virus) viremia      ESRD (end stage renal disease) on dialysis (H)      ILD (interstitial lung disease) (H)      Lung replaced by transplant (H)      Osteopenia      Paroxysmal atrial fibrillation (H)      Pneumocystis jiroveci pneumonia (H)      PONV (postoperative nausea and vomiting)      Pulmonary hypertension (H)      Raynaud's disease      Seronegative rheumatoid arthritis (H)        Past Surgical History:   Procedure Laterality Date     BRONCHOSCOPY (RIGID OR FLEXIBLE), DIAGNOSTIC N/A 4/10/2018    Procedure: COMBINED BRONCHOSCOPY (RIGID OR FLEXIBLE), LAVAGE;;  Surgeon: Mariposa Donohue MD;  Location: [facility]     BRONCHOSCOPY (RIGID OR FLEXIBLE), DIAGNOSTIC N/A 12/23/2020    Procedure: BRONCHOSCOPY, WITH BRONCHOALVEOLAR LAVAGE;  Surgeon: Uri Bass MD;  Location: [facility]     BRONCHOSCOPY (RIGID OR FLEXIBLE), DILATE BRONCHUS / TRACHEA N/A 10/11/2018    Procedure: BRONCHOSCOPY (RIGID OR FLEXIBLE),  DILATE BRONCHUS / TRACHEA;  Flexible And Rigid Bronchoscopy and Dilation;  Surgeon: Wilber Lin MD;  [facility]     BRONCHOSCOPY FLEXIBLE N/A 3/13/2018    Procedure: BRONCHOSCOPY FLEXIBLE;  Flexible Bronchoscopy ;  Surgeon: Gissell Sanchez MD;  [facility]: [facility]     BRONCHOSCOPY FLEXIBLE N/A 5/9/2018    Procedure: BRONCHOSCOPY FLEXIBLE;;  Surgeon: Wilber Lin MD;  [facility]     BRONCHOSCOPY FLEXIBLE AND RIGID N/A 9/10/2018    Procedure: BRONCHOSCOPY FLEXIBLE AND RIGID;  Flexible and Rigid Bronchoscopy with Balloon Dilation, tissue debulking with cryo, and Right mainstem bronchus stent placement;  Surgeon: Wilber Lin MD;  [facility]     BRONCHOSCOPY RIGID N/A 6/6/2018    Procedure: BRONCHOSCOPY RIGID;;  Surgeon: Lopez Macias MD;  [facility]: [facility]     BRONCHOSCOPY, DILATE BRONCHUS, STENT BRONCHUS, COMBINED N/A 6/11/2018    Procedure: COMBINED BRONCHOSCOPY, DILATE BRONCHUS, STENT BRONCHUS;  Flexible Bronchoscopy, Balloon Dilation, Bronchial Washings;  Surgeon: Wilber Lin MD;  [facility]     BRONCHOSCOPY, DILATE BRONCHUS, STENT BRONCHUS, COMBINED Right 7/10/2018    Procedure: COMBINED BRONCHOSCOPY, DILATE BRONCHUS, STENT BRONCHUS;  Flexible Bronchoscopy, Balloon Dilation, Bronchial Washings  ;  Surgeon: Wilber Lin MD;  [facility]     BRONCHOSCOPY, DILATE BRONCHUS, STENT BRONCHUS, COMBINED N/A 8/2/2018    Procedure: COMBINED BRONCHOSCOPY, DILATE BRONCHUS, STENT BRONCHUS;  Flexible Bronchoscopy, Bronchial Washings, Balloon Dilation;  Surgeon: Wilber Lin MD;  [facility]     BRONCHOSCOPY, DILATE BRONCHUS, STENT BRONCHUS, COMBINED N/A 8/20/2018    Procedure: COMBINED BRONCHOSCOPY, DILATE BRONCHUS, STENT BRONCHUS;  Flexible Bronchoscopy, Balloon Dilation;  Surgeon: Wilber Lin MD;  [facility]     BRONCHOSCOPY, DILATE BRONCHUS, STENT BRONCHUS, COMBINED N/A 10/29/2018    Procedure: Flexible  Bronchoscopy, Balloon Dilation, Stent Revision, Airway Examination And Therapeutic Suctioning, Cyro Tumor Debulking;  Surgeon: Wilber Lin MD;  [facility]     BRONCHOSCOPY, DILATE BRONCHUS, STENT BRONCHUS, COMBINED N/A 11/20/2018    Procedure: Rigid Bronchoscopy, Stent Removal and dilitation;  Surgeon: Wilber Lin MD;  [facility]     BRONCHOSCOPY, DILATE BRONCHUS, STENT BRONCHUS, COMBINED N/A 12/14/2018    Procedure: Flexible And Rigid Bronchoscopy, Balloon Dilation, Bronchial Washing;  Surgeon: Wilber Lin MD;  [facility]     BRONCHOSCOPY, DILATE BRONCHUS, STENT BRONCHUS, COMBINED N/A 1/17/2019    Procedure: Flexible And Rigid Bronchoscopy, Balloon Dilation.;  Surgeon: Wilber Lin MD;  [facility]     BRONCHOSCOPY, DILATE BRONCHUS, STENT BRONCHUS, COMBINED N/A 3/7/2019    Procedure: Flexible and Rigid Bronchoscopy, Bronchial Washing, Balloon Dilation;  Surgeon: Wilber Lin MD;  [facility]     BRONCHOSCOPY, DILATE BRONCHUS, STENT BRONCHUS, COMBINED N/A 6/6/2019    Procedure: Rigid and Flexible Bronchoscopy, Balloon Dilation;  Surgeon: Wilber Lin MD;  [facility]     BRONCHOSCOPY, DILATE BRONCHUS, STENT BRONCHUS, COMBINED N/A 10/11/2019    Procedure: Flexible and Rigid Bronchoscopy, Balloon Dilation, Bronchoalveolar Lagave;  Surgeon: Wilber Lin MD;  [facility]     BRONCHOSCOPY, DILATE BRONCHUS, STENT BRONCHUS, COMBINED N/A 2/19/2021    Procedure: BRONCHOSCOPY, flexible, airway dilation, and bronchial wash;  Surgeon: Wilber Lin MD;  [facility]     CV RIGHT HEART CATH MEASUREMENTS RECORDED N/A 3/10/2020    Procedure: CV RIGHT HEART CATH;  Surgeon: Wai Garcia MD;  Location:  HEART CARDIAC CATH LAB     ENT SURGERY      tonsillectomy as a child     ESOPHAGOSCOPY, GASTROSCOPY, DUODENOSCOPY (EGD), COMBINED N/A 10/29/2018    Procedure: COMBINED ESOPHAGOSCOPY, GASTROSCOPY, DUODENOSCOPY  (EGD) with biopsies and polypectomy;  Surgeon: Chente Bloom MD;  [facility]     INSERT EXTRACORPORAL MEMBRANE OXYGENATOR ADULT (OUTSIDE OR) N/A 2/27/2018    Procedure: INSERT EXTRACORPORAL MEMBRANE OXYGENATOR ADULT (OUTSIDE OR);  INSERT EXTRACORPORAL MEMBRANE OXYGENATOR ADULT (OUTSIDE OR) ;  Surgeon: Toby Hernandez MD;  [facility]     IR CVC TUNNEL PLACEMENT > 5 YRS OF AGE  10/25/2019     IR GASTRO JEJUNOSTOMY TUBE PLACEMENT  2/16/2021     IR PARACENTESIS  1/8/2020     IR THORACENTESIS  9/13/2019     IR TRANSCATHETER BIOPSY  1/8/2020     no prior surgery       REMOVE EXTRACORPORAL MEMBRANE OXYGENATOR ADULT N/A 3/3/2018    Procedure: REMOVE EXTRACORPORAL MEMBRANE OXYGENATOR ADULT;  Removal of Right Femoral Venous and Right Axillary Arterial Extracorporeal Membrane Oxygenator;  Surgeon: Toby Hernandez MD;  [facility]     TRANSPLANT LUNG RECIPIENT SINGLE X2 Bilateral 3/1/2018    Procedure: TRANSPLANT LUNG RECIPIENT SINGLE X2;  Median Sternotomy, Extracorporeal Membrane Oxygenator, bilateral sequential lung transplant;  Surgeon: Toby Hernandez MD;  [facility]             Social History:     Social History     Socioeconomic History     Marital status:      Spouse name: Not on file     Number of children: Not on file     Years of education: Not on file     Highest education level: Not on file   Occupational History     Not on file   Social Needs     Financial resource strain: Not on file     Food insecurity     Worry: Not on file     Inability: Not on file     Transportation needs     Medical: Not on file     Non-medical: Not on file   Tobacco Use     Smoking status: Never Smoker     Smokeless tobacco: Never Used   Substance and Sexual Activity     Alcohol use: No     Alcohol/week: 1.0 standard drinks     Types: 1 Glasses of wine per week     Drug use: No     Sexual activity: Not on file   Lifestyle     Physical activity     Days per week: Not on  file     Minutes per session: Not on file     Stress: Not on file   Relationships     Social connections     Talks on phone: Not on file     Gets together: Not on file     Attends Zoroastrian service: Not on file     Active member of club or organization: Not on file     Attends meetings of clubs or organizations: Not on file     Relationship status: Not on file     Intimate partner violence     Fear of current or ex partner: Not on file     Emotionally abused: Not on file     Physically abused: Not on file     Forced sexual activity: Not on file   Other Topics Concern     Parent/sibling w/ CABG, MI or angioplasty before 65F 55M? No   Social History Narrative    3/6/2019 - Lives with . Has three daughters. Four grandchildren (two ). No pets. Travelled previously to [place]. Has visited [place] several times.      Social Updates:          Rejection and Infection History     Rejection Hx    DATE INDICATION  PATH BAL/MICRO TREATMENT                Infectious Hx           Exam:     BP (!) 145/78   Pulse 85   Temp 97.9  F (36.6  C)   Wt 52.2 kg (115 lb)   LMP 2014 (Exact Date)   SpO2 100%   BMI 20.37 kg/m    Body mass index is 20.37 kg/m .    GENERAL APPEARANCE: Well developed, thin, alert, and in no apparent distress.  EYES: PERRL, EOMI  HENT: Nasal mucosa with no edema and no hyperemia. No nasal polyps.  EARS: Canals clear, TMs normal  MOUTH: Oral mucosa is moist, without any lesions, no tonsillar enlargement, no oropharyngeal exudate.  NECK: supple, no masses, no thyromegaly.  LYMPHATICS: No significant axillary, cervical, or supraclavicular nodes.  RESP: normal percussion, good air flow throughout.  No crackles. No rhonchi. No wheezes.  CV: Normal S1, S2, regular rhythm, normal rate. No murmur.  No rub. No gallop. No LE edema.   ABDOMEN:  Bowel sounds normal, soft, nontender, no HSM or masses.   MS: extremities normal. No clubbing. No cyanosis.  SKIN: no rash on limited exam  NEURO:  Mentation intact, speech normal, normal strength and tone, normal gait and stance, no significant tremor at rest  PSYCH: mentation appears normal. and affect normal/bright         Data:     Results:  Recent Results (from the past 168 hour(s))   INR    Collection Time: 04/08/21  7:22 AM   Result Value Ref Range    INR 1.04 0.86 - 1.14   CBC with platelets differential    Collection Time: 04/08/21  7:22 AM   Result Value Ref Range    WBC 6.3 4.0 - 11.0 10e9/L    RBC Count 2.98 (L) 3.8 - 5.2 10e12/L    Hemoglobin 9.7 (L) 11.7 - 15.7 g/dL    Hematocrit 29.9 (L) 35.0 - 47.0 %     78 - 100 fl    MCH 32.6 26.5 - 33.0 pg    MCHC 32.4 31.5 - 36.5 g/dL    RDW 15.4 (H) 10.0 - 15.0 %    Platelet Count 178 150 - 450 10e9/L    Diff Method Automated Method     % Neutrophils 75.1 %    % Lymphocytes 11.0 %    % Monocytes 11.8 %    % Eosinophils 1.1 %    % Basophils 0.5 %    % Immature Granulocytes 0.5 %    Nucleated RBCs 0 0 /100    Absolute Neutrophil 4.8 1.6 - 8.3 10e9/L    Absolute Lymphocytes 0.7 (L) 0.8 - 5.3 10e9/L    Absolute Monocytes 0.8 0.0 - 1.3 10e9/L    Absolute Eosinophils 0.1 0.0 - 0.7 10e9/L    Absolute Basophils 0.0 0.0 - 0.2 10e9/L    Abs Immature Granulocytes 0.0 0 - 0.4 10e9/L    Absolute Nucleated RBC 0.0    Basic metabolic panel    Collection Time: 04/08/21  7:22 AM   Result Value Ref Range    Sodium 138 133 - 144 mmol/L    Potassium 3.7 3.4 - 5.3 mmol/L    Chloride 101 94 - 109 mmol/L    Carbon Dioxide 32 20 - 32 mmol/L    Anion Gap 5 3 - 14 mmol/L    Glucose 138 (H) 70 - 99 mg/dL    Urea Nitrogen 22 7 - 30 mg/dL    Creatinine 2.49 (H) 0.52 - 1.04 mg/dL    GFR Estimate 21 (L) >60 mL/min/[1.73_m2]    GFR Estimate If Black 24 (L) >60 mL/min/[1.73_m2]    Calcium 7.9 (L) 8.5 - 10.1 mg/dL   Magnesium    Collection Time: 04/08/21  7:22 AM   Result Value Ref Range    Magnesium 1.5 (L) 1.6 - 2.3 mg/dL   General PFT Lab (Please always keep checked)    Collection Time: 04/08/21  7:34 AM   Result Value Ref Range     FVC-Pred 3.23 L    FVC-Pre 1.41 L    FVC-%Pred-Pre 43 %    FEV1-Pre 1.09 L    FEV1-%Pred-Pre 42 %    FEV1FVC-Pred 80 %    FEV1FVC-Pre 77 %    FEFMax-Pred 6.40 L/sec    FEFMax-Pre 2.34 L/sec    FEFMax-%Pred-Pre 36 %    FEF2575-Pred 2.36 L/sec    FEF2575-Pre 0.99 L/sec    ETP7755-%Pred-Pre 42 %    ExpTime-Pre 5.08 sec    FIFMax-Pre 2.47 L/sec    FEV1FEV6-Pred 81 %    FEV1FEV6-Pre 81 %         Date Place TLC (%) FVC (%) FEV1 (%) FEV1/FVC DLCO (%) Note   4/8/21    1.41 43 1.09 42 77                                                  Best Post Txp PFTs: Baseline: FEV1 1.63 FVC: 2.24  6/5/19: FEV1 1.65  8/7/19: FEV1 1.60   6/5/19: FVC 2.26L  8/7/19: FVC 2.22L   Results as noted above.    April 8, 2021  PFT Interpretation:  Severe obstructive ventilatory defect.  Increased from previous.  Below recent best.   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 Her FEV1 is 67% her baseline today, but I suspect this may be her recovering some function after the insult from ARDS 2/2 PJP. She's functionally feeling very well and has very minimal cough to no cough.   - Repeat PFTs in next month  - If still no improvement would start on azithromycin prophylaxis and singulair  - Bronch    Immunosuppression:  Tacrolimus, goal 8-10  PRednisone 5/ 2.5  ?Not on third drug suspect due to aspergillus empyema       Prophylaxis:   PJP:  Bactrim MWF  CMV: D /R, trending q 2 weeks, VGCV off  EBV: D /R Trending q 4 weeks, last on 3/22/21  Fungal:     Recent ARDS 2/2  PJP: Was off prophylaxis. Admitted on 1/22 with ARDS thought 2/2 PJP, trache placed on 2/12, PEGJ on 2/16, dilation of RMSB on 2/19, and transferred to acute care rehab on 2/25. Now on 1L of O2 with exertion, trach decannulated on 3/4. Finished a course of bactrim x 21 days.   - Continue supplemental O2  - Albuterol QID  - Mucomyst BID  - Pulmicort BID  - CXR looks overall improved with small rightp lueral effusion, unclear if loculated left pleural effusion is still present  - F/U outpatient repeat bronchoscopy tomorrow, if no significant secretions will stop her mucomyst/albuterol/pulmicort    Left Sided Aspergillus Empyema: Diagnosed in 10/2019. Antimicrobial beads placed. She has had a persistent loculated pleural effusion on the left. Discussed with Dr. Marvin   - Posaconazole 300 mg daily  - Transplant ID follow up  - May need to repeat pigtail and consider pleurodesis of the right chest    ESRD: Was on iHD M/Th prior to most recent hospitalization. Access is AVF with partial graft s/p clot removal by IR on 3/2/21. Continues now on iHD T/TH/Sa.   - Not on diuretics  - Restarted her slow mag today with mag of 1.5, will trend closely and dose on non dialysis days    Maintenance:  Derm Exam: Due Annually  Colon Ca Screening:  DEXA: Overdue, last in 2015 from what I can tell, repeat DEXA this year  Imms:         CHANGES TODAY  - Start slow mag 3 tabs on non dialysis days    - Can consider stopping mucomyst/albuterol/budesonide if bronch is okay without significant mucus plugging    - CT chest to reassess pleural effusion    - Will discuss with  after CT scan results and potential chest tube placement and drainage of left pleural effusion with potential pleurodesis/obliteration of the space and the hope that we could take her off posaconazole     - Discussed idea of renal transplant, would ideally like to have aspergillus empyema taken care of given that she would likely need 3 drug immunosuppression again for  a year     - Remove GJ tube today    - DEXA due this year      I personally spent 60 minutes reviewing the patient's chart, discussing her case with Dr. Marvin, interviewing her and documenting on 4/8/21.      Yenni Graham MD  [facility]   Pulmonary Transplant   Pre Transplant Coordinator: Erna Lopez  Ph: [phone]  Post Transplant Coordinator: Gaye Castaneda  Fax: [phone]  Ph: [phone]        Transplant Coordinator Note    Reason for visit: Post lung transplant follow up visit   Coordinator: Present - on phone  Caregiver:      Health concerns addressed today:  1. Feeling well - continues to feel weak, working on walking more  2. Trach out - no issues  3. Respiratory - PFTs improved. 6MWT yesterday - maintained O2 level at 95%. Cough in AMs, sometimes productive - clear sputum, easy to cough up.   4. ENT: a little sinus drainage - clear, no issues, otherwise at baseline.   5. GI: continues to have feeding tube. Good with PO intake. No nausea, no vomiting. Will work on getting GJ tube out.     Activity/rehab: start rehab next week  Oxygen needs: room air  Pain management/RX: denies  Diabetic management: no longer on novolog.   High risk donor:   CMV status:  Valcyte stopped:   PJP prophylactic: Bactrim    COVID:  1. COVID-19 infection (yes/no, date of most recent positive test):   2. Status/instructions given about COVID-19 vaccine:     Pt Education: medications (use/dose/side effects), how/when to call coordinator, frequency of labs, s/s of infection/rejection, call prior to starting any new medications, lab/vital sign book    Health Maintenance:     Last colonoscopy:     Next colonoscopy due:     Dermatology:    Vaccinations this visit:     Labs, CXR, PFTs reviewed with patient  Medication record reviewed and reconciled  Questions and concerns addressed    Patient Instructions  1. Continue to hydrate with 60-70 oz fluids daily.  2. Continue  to exercise daily or most days of the week.  3. Follow up with your primary care provider for annual gender health maintenance procedures.  4. Follow up with colonoscopy schedule.  5. Follow up with annual dermatology visits.  6. Chest CT today.   7. When you're done with your CT, drive over to the [facility]. Check in to the [facility].   8. Take your magnesium on your on your non-dialysis days.   9. We will call you with a plan for draining that pleural space as soon as we have one in place.     Next transplant clinic appointment: 6-8 weeks with CXR, labs and PFTs  Next lab draw: per kidney docs and nephrologists. For tacrolimus level, pending today's level, otherwise in 3-4 weeks.        AVS printed at time of check out    Again, thank you for allowing me to participate in the care of your patient.        Sincerely,        Yenni Graham MD

## 2021-04-08 NOTE — ANESTHESIA PREPROCEDURE EVALUATION
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 difficile colitis      Cytomegalovirus (CMV) viremia (H)      Dermatomyositis (H)      Dysplasia of cervix, low grade (ESTRADA 1)      EBV (Franklin-Barr virus) viremia      ESRD (end stage renal disease) on dialysis (H)      ILD (interstitial lung disease) (H)      Lung replaced by transplant (H)      Osteopenia      Paroxysmal atrial fibrillation (H)      Pneumocystis jiroveci pneumonia (H)      PONV (postoperative nausea and vomiting)      Pulmonary hypertension (H)      Raynaud's disease      Seronegative rheumatoid arthritis (H)       Past Surgical History:   Procedure Laterality Date     BRONCHOSCOPY (RIGID OR FLEXIBLE), DIAGNOSTIC N/A 4/10/2018    Procedure: COMBINED BRONCHOSCOPY (RIGID OR FLEXIBLE), LAVAGE;;  Surgeon: Mariposa Donohue MD;  Location:  GI     BRONCHOSCOPY (RIGID OR FLEXIBLE), DIAGNOSTIC N/A 2020    Procedure: BRONCHOSCOPY, WITH BRONCHOALVEOLAR LAVAGE;  Surgeon: Uri Bass MD;  Location:  GI     BRONCHOSCOPY (RIGID OR FLEXIBLE), DILATE BRONCHUS / TRACHEA N/A 10/11/2018    Procedure: BRONCHOSCOPY (RIGID OR FLEXIBLE), DILATE BRONCHUS / TRACHEA;  Flexible And Rigid Bronchoscopy and Dilation;  Surgeon: Wilber Lin MD;  [facility]     BRONCHOSCOPY FLEXIBLE N/A 3/13/2018    Procedure: BRONCHOSCOPY FLEXIBLE;  Flexible Bronchoscopy ;  Surgeon: Gissell Sanchez MD;  [facility]     BRONCHOSCOPY  FLEXIBLE N/A 5/9/2018    Procedure: BRONCHOSCOPY FLEXIBLE;;  Surgeon: Wilber Lin MD;  [facility]     BRONCHOSCOPY FLEXIBLE AND RIGID N/A 9/10/2018    Procedure: BRONCHOSCOPY FLEXIBLE AND RIGID;  Flexible and Rigid Bronchoscopy with Balloon Dilation, tissue debulking with cryo, and Right mainstem bronchus stent placement;  Surgeon: Wilber Lin MD;  [facility]     BRONCHOSCOPY RIGID N/A 6/6/2018    Procedure: BRONCHOSCOPY RIGID;;  Surgeon: Lopez Macias MD;  [facility]     BRONCHOSCOPY, DILATE BRONCHUS, STENT BRONCHUS, COMBINED N/A 6/11/2018    Procedure: COMBINED BRONCHOSCOPY, DILATE BRONCHUS, STENT BRONCHUS;  Flexible Bronchoscopy, Balloon Dilation, Bronchial Washings;  Surgeon: Wilber Lin MD;  [facility]     BRONCHOSCOPY, DILATE BRONCHUS, STENT BRONCHUS, COMBINED Right 7/10/2018    Procedure: COMBINED BRONCHOSCOPY, DILATE BRONCHUS, STENT BRONCHUS;  Flexible Bronchoscopy, Balloon Dilation, Bronchial Washings  ;  Surgeon: Wilber Lin MD;  [facility]     BRONCHOSCOPY, DILATE BRONCHUS, STENT BRONCHUS, COMBINED N/A 8/2/2018    Procedure: COMBINED BRONCHOSCOPY, DILATE BRONCHUS, STENT BRONCHUS;  Flexible Bronchoscopy, Bronchial Washings, Balloon Dilation;  Surgeon: Wilber Lin MD;  [facility]     BRONCHOSCOPY, DILATE BRONCHUS, STENT BRONCHUS, COMBINED N/A 8/20/2018    Procedure: COMBINED BRONCHOSCOPY, DILATE BRONCHUS, STENT BRONCHUS;  Flexible Bronchoscopy, Balloon Dilation;  Surgeon: Wilber Lin MD;  [facility]     BRONCHOSCOPY, DILATE BRONCHUS, STENT BRONCHUS, COMBINED N/A 10/29/2018    Procedure: Flexible Bronchoscopy, Balloon Dilation, Stent Revision, Airway Examination And Therapeutic Suctioning, Cyro Tumor Debulking;  Surgeon: Wilber Lin MD;  [facility]     BRONCHOSCOPY, DILATE BRONCHUS, STENT BRONCHUS, COMBINED N/A 11/20/2018    Procedure: Rigid Bronchoscopy, Stent Removal and  dilitation;  Surgeon: Wilber Lin MD;  [facility]     BRONCHOSCOPY, DILATE BRONCHUS, STENT BRONCHUS, COMBINED N/A 12/14/2018    Procedure: Flexible And Rigid Bronchoscopy, Balloon Dilation, Bronchial Washing;  Surgeon: Wilber Lin MD;  [facility]     BRONCHOSCOPY, DILATE BRONCHUS, STENT BRONCHUS, COMBINED N/A 1/17/2019    Procedure: Flexible And Rigid Bronchoscopy, Balloon Dilation.;  Surgeon: Wilber Lin MD;  [facility]     BRONCHOSCOPY, DILATE BRONCHUS, STENT BRONCHUS, COMBINED N/A 3/7/2019    Procedure: Flexible and Rigid Bronchoscopy, Bronchial Washing, Balloon Dilation;  Surgeon: Wilber Lin MD;  [facility]     BRONCHOSCOPY, DILATE BRONCHUS, STENT BRONCHUS, COMBINED N/A 6/6/2019    Procedure: Rigid and Flexible Bronchoscopy, Balloon Dilation;  Surgeon: Wilber Lin MD;  [facility]     BRONCHOSCOPY, DILATE BRONCHUS, STENT BRONCHUS, COMBINED N/A 10/11/2019    Procedure: Flexible and Rigid Bronchoscopy, Balloon Dilation, Bronchoalveolar Lagave;  Surgeon: Wilber Lin MD;  [facility]     BRONCHOSCOPY, DILATE BRONCHUS, STENT BRONCHUS, COMBINED N/A 2/19/2021    Procedure: BRONCHOSCOPY, flexible, airway dilation, and bronchial wash;  Surgeon: Wilber Lin MD;  [facility]     CV RIGHT HEART CATH MEASUREMENTS RECORDED N/A 3/10/2020    Procedure: CV RIGHT HEART CATH;  Surgeon: Wai Garcia MD;  [facility]     ENT SURGERY      tonsillectomy as a child     ESOPHAGOSCOPY, GASTROSCOPY, DUODENOSCOPY (EGD), COMBINED N/A 10/29/2018    Procedure: COMBINED ESOPHAGOSCOPY, GASTROSCOPY, DUODENOSCOPY (EGD) with biopsies and polypectomy;  Surgeon: Chente Bloom MD;  [facility]     INSERT EXTRACORPORAL MEMBRANE OXYGENATOR ADULT (OUTSIDE OR) N/A 2/27/2018    Procedure: INSERT EXTRACORPORAL MEMBRANE OXYGENATOR ADULT (OUTSIDE OR);  INSERT EXTRACORPORAL MEMBRANE OXYGENATOR ADULT  (OUTSIDE OR) ;  Surgeon: Toby Hernandez MD;  [facility]     IR CVC TUNNEL PLACEMENT > 5 YRS OF AGE  10/25/2019     IR GASTRO JEJUNOSTOMY TUBE PLACEMENT  2/16/2021     IR PARACENTESIS  1/8/2020     IR THORACENTESIS  9/13/2019     IR TRANSCATHETER BIOPSY  1/8/2020     no prior surgery       REMOVE EXTRACORPORAL MEMBRANE OXYGENATOR ADULT N/A 3/3/2018    Procedure: REMOVE EXTRACORPORAL MEMBRANE OXYGENATOR ADULT;  Removal of Right Femoral Venous and Right Axillary Arterial Extracorporeal Membrane Oxygenator;  Surgeon: Toby Hernandez MD;  [facility]     TRANSPLANT LUNG RECIPIENT SINGLE X2 Bilateral 3/1/2018    Procedure: TRANSPLANT LUNG RECIPIENT SINGLE X2;  Median Sternotomy, Extracorporeal Membrane Oxygenator, bilateral sequential lung transplant;  Surgeon: Toby Hernandez MD;  [facility]      No Known Allergies   Social History     Tobacco Use     Smoking status: Never Smoker     Smokeless tobacco: Never Used   Substance Use Topics     Alcohol use: No     Alcohol/week: 1.0 standard drinks     Types: 1 Glasses of wine per week      Wt Readings from Last 1 Encounters:   04/08/21 52.2 kg (115 lb)        PMHx:  This is a 60 year old year old female who underwent double lung transplant on 3/1/2018 course complicated by ESRD on dialysis, right mainstem bronchial stenosis, aspergillus empyema, and CMV viremia. She is now s/p trach on 2/12/21 due to ARDS    Anesthesia Evaluation   Pt has had prior anesthetic. Type: General.    History of anesthetic complications  - PONV.      ROS/MED HX  ENT/Pulmonary: Comment: 3/1/18: lung transplant for interstitial lung dz c/b R mainstem bronchus stenosis s/p numerous flex, rigid, dilations since.  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  Right ventricular function, chamber size, wall motion, and thickness are  normal.  IVC diameter >2.1 cm collapsing <50% with sniff suggests a high RA pressure  estimated at 15 mmHg or greater.  Stress Test: Date: Results:    ECG Reviewed: Date: Results:    Cath: Date: 03/10/20 Results:    Right sided filling pressures are mildly elevated.    Mild elevated Pulmonary Hypertension.    Left sided filling pressures are mildly elevated.    Normal cardiac output level.    PA mean = 24    METS/Exercise Tolerance:     Hematologic:       Musculoskeletal:   (+) arthritis (seronegative RA),     GI/Hepatic: Comment: S/p PEG/J tube during hospitalization for ARDS 2/2021      Renal/Genitourinary: Comment: T/Th/Sa dialysis schedule     (+) renal disease, type: ESRD, Pt requires dialysis, type: Hemodialysis,     Endo:     (+) Chronic steroid usage for Post Transplant Immunosuppression.     Psychiatric/Substance Use:       Infectious Disease:       Malignancy:       Other:            Physical Exam    Airway        Mallampati: I   TM distance: > 3 FB   Neck ROM: full   Mouth opening: > 3 cm    Respiratory Devices and Support         Dental  no notable dental history         Cardiovascular   cardiovascular exam normal          Pulmonary   pulmonary exam normal                OUTSIDE LABS:  CBC:   Lab Results   Component Value Date    WBC 6.3 04/08/2021    WBC 8.4 03/29/2021    HGB 9.7 (L) 04/08/2021    HGB 10.5 (A) 03/29/2021    HCT 29.9 (L) 04/08/2021    HCT 33.4 03/29/2021     04/08/2021     03/29/2021     BMP:   Lab Results   Component Value Date     04/08/2021     03/29/2021    POTASSIUM 3.7 04/08/2021    POTASSIUM 4.3 03/29/2021    CHLORIDE 101 04/08/2021    CHLORIDE 101 03/29/2021     CO2 32 04/08/2021    CO2 25 03/29/2021    BUN 22 04/08/2021    BUN 11.00 03/29/2021    BUN 49.5 03/29/2021    CR 2.49 (H) 04/08/2021    CR 4.50 03/29/2021     (H) 04/08/2021     (A) 03/29/2021     COAGS:   Lab Results   Component Value Date    PTT 28 02/12/2021    INR 1.04 04/08/2021    FIBR 358 02/11/2021     POC:   Lab Results   Component Value Date     (H) 03/05/2021    HCG Negative 06/06/2019     HEPATIC:   Lab Results   Component Value Date    ALBUMIN 3.5 03/29/2021    PROTTOTAL 6.8 03/01/2021    ALT 36 03/29/2021    AST 19 03/29/2021     (H) 11/11/2019    ALKPHOS 288 03/29/2021    BILITOTAL 0.9 03/29/2021    SALAS <10 (L) 01/24/2021     OTHER:   Lab Results   Component Value Date    PH 7.41 02/10/2021    LACT 0.5 (L) 02/09/2021    A1C 6.0 (H) 01/24/2021    CHELSEY 7.9 (L) 04/08/2021    PHOS 5.8 03/29/2021    MAG 1.5 (L) 04/08/2021    LIPASE 212 10/24/2019    AMYLASE 58 02/19/2018    TSH 1.80 08/01/2018    CRP 25.0 (H) 10/06/2019    SED 93 (H) 10/07/2019       Anesthesia Plan    ASA Status:  4      Anesthesia Type: General.     - Airway: ETT   Induction: Propofol.   Maintenance: TIVA.        Consents    Anesthesia Plan(s) and associated risks, benefits, and realistic alternatives discussed. Questions answered and patient/representative(s) expressed understanding.     - Discussed with:  Patient      - Extended Intubation/Ventilatory Support Discussed: No.      - Patient is DNR/DNI Status: No    Use of blood products discussed: No .     Postoperative Care    Pain management: IV analgesics.   PONV prophylaxis: Ondansetron (or other 5HT-3), Dexamethasone or Solumedrol, Background Propofol Infusion     Comments:                Mati Valdez MD

## 2021-04-08 NOTE — PROGRESS NOTES
Patient arrived to IR pre/post area with unibody GJ tube in place. RN verified name and date of birth with patient and verified order for GJ to be removed. RN used lido jelly to lubricate the tube for removal. 9 mL of fluid removed from balloon and GJ was removed without complications. Site was dressed with gauze and a Primapore dressing. Patient was advised that site will be leaky for a few days post tube removal and given extra dressings. Patient also advised to eat smaller more frequent meals and to keep area covered while showering. No tub, baths, hot tubs or swimming until site is fully closed. Site was clean dry and intact at discharge. Patient was given IR phone number for any complications.  RN walked patient out to [facility].

## 2021-04-08 NOTE — PROGRESS NOTES
Transplant Coordinator Note    Reason for visit: Post lung transplant follow up visit   Coordinator: Present - on phone  Caregiver:      Health concerns addressed today:  1. Feeling well - continues to feel weak, working on walking more  2. Trach out - no issues  3. Respiratory - PFTs improved. 6MWT yesterday - maintained O2 level at 95%. Cough in AMs, sometimes productive - clear sputum, easy to cough up.   4. ENT: a little sinus drainage - clear, no issues, otherwise at baseline.   5. GI: continues to have feeding tube. Good with PO intake. No nausea, no vomiting. Will work on getting GJ tube out.     Activity/rehab: start rehab next week  Oxygen needs: room air  Pain management/RX: denies  Diabetic management: no longer on novolog.   High risk donor:   CMV status:  Valcyte stopped:   PJP prophylactic: Bactrim    COVID:  1. COVID-19 infection (yes/no, date of most recent positive test):   2. Status/instructions given about COVID-19 vaccine:     Pt Education: medications (use/dose/side effects), how/when to call coordinator, frequency of labs, s/s of infection/rejection, call prior to starting any new medications, lab/vital sign book    Health Maintenance:     Last colonoscopy:     Next colonoscopy due:     Dermatology:    Vaccinations this visit:     Labs, CXR, PFTs reviewed with patient  Medication record reviewed and reconciled  Questions and concerns addressed    Patient Instructions  1. Continue to hydrate with 60-70 oz fluids daily.  2. Continue to exercise daily or most days of the week.  3. Follow up with your primary care provider for annual gender health maintenance procedures.  4. Follow up with colonoscopy schedule.  5. Follow up with annual dermatology visits.  6. Chest CT today.   7. When you're done with your CT, drive over to the [facility]. Check in to the [facility].   8. Take your magnesium on your on your non-dialysis days.   9. We will call you with a plan for  draining that pleural space as soon as we have one in place.     Next transplant clinic appointment: 6-8 weeks with CXR, labs and PFTs  Next lab draw: per kidney docs and nephrologists. For tacrolimus level, pending today's level, otherwise in 3-4 weeks.        AVS printed at time of check out

## 2021-04-08 NOTE — NURSING NOTE
Chief Complaint   Patient presents with     RECHECK     lung tx follow up       BP (!) 145/78   Pulse 85   Temp 97.9  F (36.6  C)   Wt 52.2 kg (115 lb)   LMP 06/07/2014 (Exact Date)   SpO2 100%   BMI 20.37 kg/m      Jailyn FARAH, [place]

## 2021-04-08 NOTE — PATIENT INSTRUCTIONS
Patient Instructions  1. Continue to hydrate with 60-70 oz fluids daily.  2. Continue to exercise daily or most days of the week.  3. Follow up with your primary care provider for annual gender health maintenance procedures.  4. Follow up with colonoscopy schedule.  5. Follow up with annual dermatology visits.  6. Chest CT today.   7. When you're done with your CT, drive over to the [facility]. Check in to the [facility] Adult [facility].   8. Take your magnesium on your on your non-dialysis days.   9. We will call you with a plan for draining that pleural space as soon as we have one in place.     Next transplant clinic appointment: 6-8 weeks with CXR, labs and PFTs  Next lab draw: per kidney docs and nephrologists. For tacrolimus level, pending today's level, otherwise in 3-4 weeks.        ~~~~~~~~~~~~~~~~~~~~~~~~~    Thoracic Transplant Office phone [phone], fax [phone]    Office Hours 8:30 - 5:00     For after-hours urgent issues, please dial [phone], and ask to speak with the Thoracic Transplant Coordinator  On-Call, pager [phone].  --------------------  To expedite your medication refill(s), please contact your pharmacy and have them fax a refill request to: [phone]  .   *Please allow 3 business days for routine medication refills.  *Please allow 5 business days for controlled substance medication refills.    **For Diabetic medications and supplies refill(s), please contact your pharmacy and have them  Contact your Endocrine team.  --------------------  For scheduling appointments call [phone].  --------------------  Please Note: If you are active on [employer], all future test results will be sent by [employer]ave[employer] message only, and will no longer be called to patient. You may also receive communication directly from your physician.

## 2021-04-09 ENCOUNTER — ANESTHESIA (OUTPATIENT)
Dept: SURGERY | Facility: CLINIC | Age: 61
End: 2021-04-09
Payer: COMMERCIAL

## 2021-04-09 ENCOUNTER — HOSPITAL ENCOUNTER (OUTPATIENT)
Facility: CLINIC | Age: 61
Discharge: HOME | End: 2021-04-09
Attending: INTERNAL MEDICINE | Admitting: INTERNAL MEDICINE
Payer: COMMERCIAL

## 2021-04-09 VITALS
WEIGHT: 115.74 LBS | HEIGHT: 63 IN | DIASTOLIC BLOOD PRESSURE: 98 MMHG | OXYGEN SATURATION: 92 % | RESPIRATION RATE: 22 BRPM | TEMPERATURE: 98.7 F | BODY MASS INDEX: 20.51 KG/M2 | SYSTOLIC BLOOD PRESSURE: 143 MMHG | HEART RATE: 99 BPM

## 2021-04-09 DIAGNOSIS — J98.09 BRONCHIAL STENOSIS, RIGHT: ICD-10-CM

## 2021-04-09 DIAGNOSIS — J98.09 BRONCHIAL STENOSIS, RIGHT: Primary | ICD-10-CM

## 2021-04-09 LAB — GLUCOSE BLDC GLUCOMTR-MCNC: 85 MG/DL (ref 70–99)

## 2021-04-09 PROCEDURE — 31645 BRNCHSC W/THER ASPIR 1ST: CPT | Mod: GC | Performed by: INTERNAL MEDICINE

## 2021-04-09 PROCEDURE — 999N000141 HC STATISTIC PRE-PROCEDURE NURSING ASSESSMENT: Performed by: INTERNAL MEDICINE

## 2021-04-09 PROCEDURE — 250N000011 HC RX IP 250 OP 636: Performed by: NURSE ANESTHETIST, CERTIFIED REGISTERED

## 2021-04-09 PROCEDURE — 258N000003 HC RX IP 258 OP 636: Performed by: NURSE ANESTHETIST, CERTIFIED REGISTERED

## 2021-04-09 PROCEDURE — 250N000009 HC RX 250: Performed by: NURSE ANESTHETIST, CERTIFIED REGISTERED

## 2021-04-09 PROCEDURE — 370N000017 HC ANESTHESIA TECHNICAL FEE, PER MIN: Performed by: INTERNAL MEDICINE

## 2021-04-09 PROCEDURE — 710N000012 HC RECOVERY PHASE 2, PER MINUTE: Performed by: INTERNAL MEDICINE

## 2021-04-09 PROCEDURE — 272N000001 HC OR GENERAL SUPPLY STERILE: Performed by: INTERNAL MEDICINE

## 2021-04-09 PROCEDURE — 360N000083 HC SURGERY LEVEL 3 W/ FLUORO, PER MIN: Performed by: INTERNAL MEDICINE

## 2021-04-09 PROCEDURE — 82962 GLUCOSE BLOOD TEST: CPT

## 2021-04-09 PROCEDURE — 710N000010 HC RECOVERY PHASE 1, LEVEL 2, PER MIN: Performed by: INTERNAL MEDICINE

## 2021-04-09 PROCEDURE — C1726 CATH, BAL DIL, NON-VASCULAR: HCPCS | Performed by: INTERNAL MEDICINE

## 2021-04-09 RX ORDER — SODIUM CHLORIDE 9 MG/ML
INJECTION, SOLUTION INTRAVENOUS CONTINUOUS
Status: DISCONTINUED | OUTPATIENT
Start: 2021-04-09 | End: 2021-04-09 | Stop reason: HOSPADM

## 2021-04-09 RX ORDER — PROPOFOL 10 MG/ML
INJECTION, EMULSION INTRAVENOUS PRN
Status: DISCONTINUED | OUTPATIENT
Start: 2021-04-09 | End: 2021-04-09

## 2021-04-09 RX ORDER — FENTANYL CITRATE 50 UG/ML
INJECTION, SOLUTION INTRAMUSCULAR; INTRAVENOUS PRN
Status: DISCONTINUED | OUTPATIENT
Start: 2021-04-09 | End: 2021-04-09

## 2021-04-09 RX ORDER — SODIUM CHLORIDE, SODIUM LACTATE, POTASSIUM CHLORIDE, CALCIUM CHLORIDE 600; 310; 30; 20 MG/100ML; MG/100ML; MG/100ML; MG/100ML
INJECTION, SOLUTION INTRAVENOUS CONTINUOUS PRN
Status: DISCONTINUED | OUTPATIENT
Start: 2021-04-09 | End: 2021-04-09

## 2021-04-09 RX ORDER — PROPOFOL 10 MG/ML
INJECTION, EMULSION INTRAVENOUS CONTINUOUS PRN
Status: DISCONTINUED | OUTPATIENT
Start: 2021-04-09 | End: 2021-04-09

## 2021-04-09 RX ORDER — ONDANSETRON 2 MG/ML
INJECTION INTRAMUSCULAR; INTRAVENOUS PRN
Status: DISCONTINUED | OUTPATIENT
Start: 2021-04-09 | End: 2021-04-09

## 2021-04-09 RX ORDER — LIDOCAINE HYDROCHLORIDE 20 MG/ML
INJECTION, SOLUTION INFILTRATION; PERINEURAL PRN
Status: DISCONTINUED | OUTPATIENT
Start: 2021-04-09 | End: 2021-04-09

## 2021-04-09 RX ORDER — LIDOCAINE 40 MG/G
CREAM TOPICAL
Status: DISCONTINUED | OUTPATIENT
Start: 2021-04-09 | End: 2021-04-09 | Stop reason: HOSPADM

## 2021-04-09 RX ADMIN — FENTANYL CITRATE 50 MCG: 50 INJECTION, SOLUTION INTRAMUSCULAR; INTRAVENOUS at 07:38

## 2021-04-09 RX ADMIN — LIDOCAINE HYDROCHLORIDE 60 MG: 20 INJECTION, SOLUTION INFILTRATION; PERINEURAL at 07:38

## 2021-04-09 RX ADMIN — PROPOFOL 120 MG: 10 INJECTION, EMULSION INTRAVENOUS at 07:38

## 2021-04-09 RX ADMIN — FENTANYL CITRATE 50 MCG: 50 INJECTION, SOLUTION INTRAMUSCULAR; INTRAVENOUS at 07:43

## 2021-04-09 RX ADMIN — MIDAZOLAM 2 MG: 1 INJECTION INTRAMUSCULAR; INTRAVENOUS at 07:31

## 2021-04-09 RX ADMIN — PROPOFOL 200 MCG/KG/MIN: 10 INJECTION, EMULSION INTRAVENOUS at 07:40

## 2021-04-09 RX ADMIN — ONDANSETRON 4 MG: 2 INJECTION INTRAMUSCULAR; INTRAVENOUS at 07:58

## 2021-04-09 RX ADMIN — SUGAMMADEX 300 MG: 100 INJECTION, SOLUTION INTRAVENOUS at 08:00

## 2021-04-09 RX ADMIN — ROCURONIUM BROMIDE 20 MG: 10 INJECTION INTRAVENOUS at 07:44

## 2021-04-09 RX ADMIN — SODIUM CHLORIDE, POTASSIUM CHLORIDE, SODIUM LACTATE AND CALCIUM CHLORIDE: 600; 310; 30; 20 INJECTION, SOLUTION INTRAVENOUS at 07:31

## 2021-04-09 RX ADMIN — ROCURONIUM BROMIDE 30 MG: 10 INJECTION INTRAVENOUS at 07:38

## 2021-04-09 ASSESSMENT — MIFFLIN-ST. JEOR: SCORE: 1074.13

## 2021-04-09 NOTE — ANESTHESIA POSTPROCEDURE EVALUATION
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    Anesthesia Type:  General    Note:  Disposition: Outpatient   Postop Pain Control: Uneventful            Sign Out: Well controlled pain   PONV: No   Neuro/Psych: Uneventful            Sign Out: Acceptable/Baseline neuro status   Airway/Respiratory: Uneventful            Sign Out: Acceptable/Baseline resp. status   CV/Hemodynamics: Uneventful            Sign Out: Acceptable CV status   Other NRE:    DID A NON-ROUTINE EVENT OCCUR? No         Last vitals:  Vitals:    04/09/21 0630   BP: (!) 149/76   Pulse: 85   Resp: 14   SpO2: 99%       Last vitals prior to Anesthesia Care Transfer:  CRNA VITALS  4/9/2021 0733 - 4/9/2021 0829      4/9/2021             Pulse:  100    Ht Rate:  98    SpO2:  (!) 86 %    Resp Rate (observed):  (!) 5          Electronically Signed By: Louie Salazar MD  April 9, 2021  8:29 AM

## 2021-04-09 NOTE — ANESTHESIA CARE TRANSFER NOTE
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    Anesthesia Type:   General     Note:      Level of Consciousness: awake  Oxygen Supplementation: nasal cannula    Independent Airway: airway patency satisfactory and stable  Dentition: dentition unchanged  Vital Signs Stable: post-procedure vital signs reviewed and stable  Report to RN Given: handoff report given  Patient transferred to: PACU  Comments: Pt remains stable, monitors on alarms in place, report to PACU RN, no complications  Handoff Report: Identifed the Patient, Identified the Reponsible Provider, Reviewed the pertinent medical history, Discussed the surgical course, Reviewed Intra-OP anesthesia mangement and issues during anesthesia, Set expectations for post-procedure period and Allowed opportunity for questions and acknowledgement of understanding      Vitals: (Last set prior to Anesthesia Care Transfer)  CRNA VITALS  4/9/2021 0733 - 4/9/2021 0811      4/9/2021             Pulse:  100    Ht Rate:  98    SpO2:  (!) 86 %    Resp Rate (observed):  (!) 5        Electronically Signed By: ADRIÁN Corona CRNA  April 9, 2021  8:11 AM

## 2021-04-09 NOTE — PROCEDURES
INTERVENTIONAL PULMONOLOGY       Procedure(s):    A flexible and rigid bronchoscopy   Airway exam  Therapeutic suctioning (1 sites)    Indication:  Lung transplant     Attending of Record:  Mati Norris MD     Interventional Pulmonary Fellow   Juana Baugh MD     Trainees Present:   None     Medications:    General Anesthesia - See anesthesia flowsheet for details    Sedation Time:   Per Anesthesia Care Provider    Time Out:  Performed    The patient's medical record has been reviewed.  The indication for the procedure was reviewed.  The necessary history and physical examination was performed and reviewed.  The risks, benefits and alternatives of the procedure were discussed with the the patient in detail and she had the opportunity to ask questions.  I discussed in particular the potential complications including risks of minor or life-threatening bleeding and/or infection, respiratory failure, vocal cord trauma / paralysis, pneumothorax, and discomfort. Sedation risks were also discussed including abnormal heart rhythms, low blood pressure, and respiratory failure. All questions were answered to the best of my ability.  Verbal and written informed consent was obtained.  The proposed procedure and the patient's identification were verified prior to the procedure by the physician and the surgical team.    The patient was assessed for the adequacy for the procedure and to receive medications.   Mental Status:  Alert and oriented x 3  Airway examination:  Class I (Complete visualization of soft palate)  Pulmonary:  Clear to ausculation bilaterally  CV:  RRR, no murmurs or gallops  ASA Grade:  (I)  Normal healthy patient    After clinical evaluation and reviewing the indication, risks, alternatives and benefits of the procedure the patient was deemed to be in satisfactory condition to undergo the procedure.           A Tuberculosis risk assessment was performed:  The patient has no known RISK of  Tuberculosis    The procedure was performed in a negative airflow room: The patient could not be moved to a negative airflow room because of needed OR for the procedure    Maneuvers / Procedure:      A Flexible and 12mm Rigid bronchoscope bronchoscope was used for the procedure. The rigid bronchoscope was inserted into the mouth. Uvula, epiglottis and vocal cords were seen. The scope was advanced turning the bevel to 90degress while passing through the cords and into the trachea.   Airway Examination: A complete airway examination was performed from the distal trachea to the subsegmental level in each lobe of both lungs.  Pertinent findings include moderate stenosis of RM ~70%. The majority of the stenosis is due to abnormal adhesion between medial wall of the RM.      Therapeutic suctioning: 15-20min of operative time was spent clearing out the airway of debris, blood and mucous prior to the intervention.     Any disposable equipment was visually inspected and deemed to be intact immediately post procedure.      Relevant Pictures  Right mainstem anastomosis      Right mainstem anastomosis from a different angle with RUL/BI visualized      Left mainstem anastomosis      Recommendations:     -->  Successful flex/rigid bronch, airway exam and therapeutic suctioning  -->  Repeat Bronch in OR with laser assisted dilation       Mati Norris MD, MHA    of Medicine  Interventional Pulmonary  Department of Pulmonary, Allergy, Critical Care and Sleep Medicine   [facility]  Pager: [phone]   Office: [phone]  Email: [email or web address]    SANDI Harris  Clinical Nurse Specialist  Department of Thoracic Surgery  Office: [phone]  Email: [email or web address]    Chloe Avila  Interventional Pulmonology Surgery Scheduler  Office: [phone]  Email: [email or web address]

## 2021-04-09 NOTE — ANESTHESIA POSTPROCEDURE EVALUATION
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    Anesthesia Type:  General    Note:  Disposition: Outpatient   Postop Pain Control: Uneventful            Sign Out: Well controlled pain   PONV: No   Neuro/Psych: Uneventful            Sign Out: Acceptable/Baseline neuro status   Airway/Respiratory: Uneventful            Sign Out: Acceptable/Baseline resp. status   CV/Hemodynamics: Uneventful            Sign Out: Acceptable CV status   Other NRE:    DID A NON-ROUTINE EVENT OCCUR? No    Event details/Postop Comments:  Patient with rhonchus breath sounds in RLL.  Albuterol neb given.  Room air sats >90%.  IS volumes ~1L.  Patient cleared for discontinue and instructed to continue IS at home.           Last vitals:  Vitals:    04/09/21 0926 04/09/21 0930 04/09/21 0945   BP:  (!) 146/87 (!) 143/98   Pulse:  104 99   Resp:  18 22   Temp:   37.1  C (98.7  F)   SpO2: 93% 90% 92%       Last vitals prior to Anesthesia Care Transfer:  CRNA VITALS  4/9/2021 0733 - 4/9/2021 0833      4/9/2021             Pulse:  100    Ht Rate:  98    SpO2:  (!) 86 %    Resp Rate (observed):  (!) 5          Electronically Signed By: Louie Salazar MD  April 9, 2021  11:42 AM

## 2021-04-09 NOTE — DISCHARGE INSTRUCTIONS
Post-Bronchoscopy Patient Instructions:    2021  Kecia VILLAFANA Mirza      Your procedure (bronchoscopy) was completed without any immediate complications.      You may cough up scant amount of blood for the next 12-24 hours. If you have excessive cough with blood, chest pain, shortness of breath or other concerning symptoms, please report to the closest emergency room.      You may experience low grade (less than 100.5 F) fever next 24 hours for which you can take Tylenol. If the fever persists more than 24 hours contact our office or your primary care provider.      You  resume your regular diet as it was prior to procedure.      Should you have any question, please do not hesitate to call our office.    [facility], [place]  Same-Day Surgery   Adult Discharge Orders & Instructions     For 24 hours after surgery    1. Get plenty of rest.  A responsible adult must stay with you for at least 24 hours after you leave the hospital.   2. Do not drive or use heavy equipment.  If you have weakness or tingling, don't drive or use heavy equipment until this feeling goes away.  3. Do not drink alcohol.  4. Avoid strenuous or risky activities.  Ask for help when climbing stairs.   5. You may feel lightheaded.  IF so, sit for a few minutes before standing.  Have someone help you get up.   6. If you have nausea (feel sick to your stomach): Drink only clear liquids such as apple juice, ginger ale, broth or 7-Up.  Rest may also help.  Be sure to drink enough fluids.  Move to a regular diet as you feel able.  7. You may have a slight fever. Call the doctor if your fever is over 100 F (37.7 C) (taken under the tongue) or lasts longer than 24 hours.  8. You may have a dry mouth, a sore throat, muscle aches or trouble sleeping.  These should go away after 24 hours.  9. Do not make important or legal decisions.   Call your doctor for any of the followin.  Signs of infection (fever, growing  tenderness at the surgery site, a large amount of drainage or bleeding, severe pain, foul-smelling drainage, redness, swelling).    2. It has been over 8 to 10 hours since surgery and you are still not able to urinate (pass water).    3.  Headache for over 24 hours.    4.  Numbness, tingling or weakness the day after surgery (if you had spinal anesthesia).  To contact a doctor, call Dr. Norris's clinic at [phone]  or:        [phone] and ask for the resident on call for   Pulmonary (answered 24 hours a day)      Emergency Department:    [facility]: [phone]       (TTY for hearing impaired: [phone])    [facility]: [phone]       (TTY for hearing impaired: [phone])

## 2021-04-12 ENCOUNTER — DOCUMENTATION ONLY (OUTPATIENT)
Dept: CARE COORDINATION | Facility: CLINIC | Age: 61
End: 2021-04-12

## 2021-04-13 ENCOUNTER — VIRTUAL VISIT (OUTPATIENT)
Dept: INFECTIOUS DISEASES | Facility: CLINIC | Age: 61
End: 2021-04-13
Attending: INTERNAL MEDICINE
Payer: COMMERCIAL

## 2021-04-13 ENCOUNTER — HOSPITAL ENCOUNTER (OUTPATIENT)
Facility: CLINIC | Age: 61
End: 2021-04-13
Attending: INTERNAL MEDICINE | Admitting: INTERNAL MEDICINE
Payer: COMMERCIAL

## 2021-04-13 DIAGNOSIS — B59 PNEUMONIA DUE TO PNEUMOCYSTIS JIROVECII, UNSPECIFIED LATERALITY, UNSPECIFIED PART OF LUNG (H): Primary | ICD-10-CM

## 2021-04-13 DIAGNOSIS — Z94.2 LUNG TRANSPLANT STATUS, BILATERAL (H): ICD-10-CM

## 2021-04-13 DIAGNOSIS — B44.89 INFECTION BY ASPERGILLUS FUMIGATUS (H): ICD-10-CM

## 2021-04-13 PROCEDURE — 99214 OFFICE O/P EST MOD 30 MIN: CPT | Mod: 95 | Performed by: INTERNAL MEDICINE

## 2021-04-13 NOTE — LETTER
4/13/2021       RE: Kecia Blue  [address] [place] [address]     Dear Colleague,    Thank you for referring your patient, Kecia Blue, to the [facility] INFECTIOUS DISEASE CLIN[facility] [place] at [facility]. Please see a copy of my visit note below.      Kecia is a 60 year old who is being evaluated via a billable video visit.      How would you like to obtain your AVS? MyChart    Will anyone else be joining your video visit? No      Video-Visit Details    Type of service:  Video Visit  Video Time: 15 minutes  Originating Location (pt. Location): Home  Distant Location (provider location):  [facility] INFECTI[facility] DISE[facility]INIC [place]   Platform used for Video Visit: Wizdee  _______________________________________________________________________________________________________  [facility]  Transplant Infectious Disease Progress Note     Patient:  Kecia Blue, Date of birth 1960, Medical record number [id number]  Date of Visit:  04/13/2021         Assessment and Recommendations:   [facility]  Transplant Infectious Disease Progress Note     Patient:  Kecia Blue, Date of birth 1960, Medical record number [id number]  Date of Visit:  04/12/2021          Assessment and Recommendations:   Recommendations:  --Cont PO Posaconazole 300 mg once daily  --Cont bactrim for secondary prophylaxis.  --Plan for bronch with BAL in 3 weeks   -- If any concerns of infection, please send respiratory specimen for bacterial and fungal cx  --Repeat CT chest in 3-4 months around mid-end July    Follow up in ID clinic in ~4 months     Vinicio Layton MD  Transplant Infectious Disease  Pager [phone]     Problem List/Assessement:  1. Recent hospitalization for severe PCP pneumonia/AHRF  2. Aspergillus fumigatus lung infection   3. Stable left Aspergillus empyema  4. S/p  lung transplant 3/1/2018 (CMV D+/R+, EBV D+/R+).                -Post transplant course has been complicated by right mainstem bronchial stenosis treated with serial dilations-most recently March 2019,      4. ESRD on iHD  5. Liver dysfunction-undetermined etiology  6. Prior hx of CMV viremia  7. Low level EBV viremia     Patient presents as a follow up visit following recent hospitalization with AHRF 2/2 PCP PNA. She is clinically doing well. Continues to take bactrim  With regards to Aspergillus, she continues on posaconazole. Last CT chest in February shows stable empyema. We have had ongoing discussions with surgical teams regarding surgical interventions for source control. However, there is high concern for high morbidity with surgery and therefore will continue to manage medically at this time with long term posaconazole.         Interval History:   Transplants:  3/1/2018 (Lung).  Coordinator Mikayla Latham     HPI:     Patient is a 58 yo female with PMHx significant for ILD, seronegative rheumatoid arthritis and dermatomyositis s/p B/L lung transplantation 3/1/2018 (CMV D+/R+, EBV D+/R+). Post transplant course was complicated by                -right mainstem bronchial stenosis treated with serial dilations-most recently March 2019,                -Left sided aspergillus empyema 10/08/2019 and                -CMV viremia.   Her immunosuppression includes tacrolimus and prednisone. Her PMHx is also significant for liver dysfunction of unclear etiology with ascites and portal HTN, as well as ESRD now on iHD and being considered for renal transplant     Briefly to review relevant medical history,     --Hospitalized 10/6/19-10/12/19--admitted with 3 month hx of left sided upper abd/chest pain. 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 Underwent CT guided biopsy on 10/8/20 which yielded purulent fluid--cx positive for aspergillus fumigatus. She underwent bronchoscopy with BAL on  10/11/20 which showed septate hyphae.  Notably, she had also grown Actinomyces from multiple BAL specimens in the past. She was started on voriconazole at the time.      Patient subsequently underwent repaet CT chest on 7/18/20 at OSH and was noted to have interval increased hypodense mass like region in medial aspect of left lower lung. As a result, patient underwent CT guided lung tissue biopsy. Histopath showed acute inflammation, necrosis and myofibroblastic proliferation with focal fungal hyphae highlighted by GMS.  Cultures positive for Aspergillus fumigatus.     1/5/21- BAL Cx with stenotrophomonas maltophilia-treated with ceftazidime x 2 weeks     1/24/21-2/25/21 hospitalized with AHRF requiring intubation 2/2 PCP pneumonia/ARDS with subsequent trach on 2/12/21. She was discharged to the acute rehab and subsequently went home on 3/5/21.     On follow up visit today, patient states she is doing well. She denies fevers/chills/sweats. No dyspnea/cough/chest pain. Has good energy and active at home. No other symptoms. She underwent bronchoscopy on 4/9/21 which showed 70% moderate stenosis of right mainstem and underwent laser assisted dilation as well as therapeutic suctioning. She states she is scheduled for another bronchoscopy in 3 weeks.    She is scheduled for bronch early next month       Review of Systems: 10 point ROS unremarkable unless stated otherwise in HPI.         Current Medications & Allergies:   Reviewed    No Known Allergies         Physical Exam:     Physical Examination: (limited exam via video)  GENERAL:  well-developed, well-nourished, in bed in no acute distress.  HEAD:  Head is normocephalic, atraumatic   EYES:  Eyes have anicteric sclerae without conjunctival injection   LUNGS:  Normal work of breathing on room air, no coughing  ABDOMEN:  Non distended  SKIN:  No acute rashes on visible areas  NEUROLOGIC:  Grossly nonfocal. Active x4 extremities         Laboratory Data:     Inflammatory  Markers    Recent Labs   Lab Test 10/07/19  1221 10/06/19  1444 09/13/19  0934 09/11/19  2325 08/22/19  0820   SED 93*  --  111* 102*  --    CRP  --  25.0* 58.0* 66.0* 47.0*       Immune Globulin Studies     Recent Labs   Lab Test 01/31/21  0441 01/25/21  0406 10/27/19  0621 03/01/18  0356 02/19/18  0759     --  936 698 1,790*   IGM  --   --   --   --  502*   IGE  --  <2  --   --  <2   IGA  --   --   --   --  425*   IGG1  --   --   --   --  1,300*   IGG2  --   --   --   --  131*   IGG3  --   --   --   --  101   IGG4  --   --   --   --  1*       Metabolic Studies       Recent Labs   Lab Test 04/08/21  0722 03/29/21 02/09/21  1615 02/09/21  1615 02/04/21  0915 02/04/21  0915 02/03/21  0415 02/03/21  0415 01/24/21  1529 01/24/21  1529 01/24/21  1458 10/21/19  1752 10/21/19  1752 08/07/19  0959 08/07/19  0959    141   < > 135   < >  --    < > 133   < >  --  134   < > 133   < > 133   POTASSIUM 3.7 4.3   < > 4.3   < >  --    < > 3.3*   < >  --  3.7   < > 5.0   < > 4.2   CHLORIDE 101 101   < > 102   < >  --    < > 98   < >  --  98   < > 109   < > 98   CO2 32 25   < > 28   < >  --    < > 25   < >  --  16*   < > 10*   < > 29   ANIONGAP 5 19.3   < > 5   < >  --    < > 10   < >  --  20*   < > 13   < > 6   BUN 22 49.5  11.00   < > 20   < >  --    < > 57*   < >  --  48*   < > 66*   < > 27   CR 2.49* 4.50   < > 1.08*   < >  --    < > 3.45*   < >  --  3.39*   < > 5.76*   < > 1.80*   GFRESTIMATED 21* 10   < > 56*   < >  --    < > 14*   < >  --  14*   < > 8*   < > 31*   * 122*   < > 153*   < >  --    < > 138*   < >  --  155*   < > 138*   < > 119*   A1C  --   --   --   --   --   --   --   --   --   --  6.0*  --   --   --   --    CHELSEY 7.9* 7.9   < > 8.3*   < >  --    < > 8.9   < >  --  8.2*   < > 8.0*   < > 9.8   PHOS  --  5.8   < >  --    < >  --    < > 2.6   < >  --  6.5*   < > 6.7*   < >  --    MAG 1.5* 1.4   < >  --    < >  --    < > 2.1   < >  --  2.4*   < > 2.0   < > 2.2   LACT  --   --   --  0.5*  --   --    --   --   --  0.7  --   --   --    < >  --    PCAL  --   --   --   --   --   --   --  0.42  --   --   --   --   --    < >  --    FGTL  --   --   --   --   --  254  --   --    < >  --   --    < >  --    < >  --    CKT  --   --   --   --   --   --   --   --   --   --   --   --  70  --  36    < > = values in this interval not displayed.       Hepatic Studies    Recent Labs   Lab Test 03/29/21 03/01/21 0637 02/27/21  0853 01/26/21 0425 01/26/21 0425 09/14/19  0533 09/14/19  0533   BILITOTAL 0.9 0.7 0.4   < > 0.8   < > 0.2   DBIL  --  0.3* 0.2   < > 0.6*   < >  --    ALKPHOS 288 339* 326*   < > 184*   < > 220*   PROTTOTAL  --  6.8 6.6*   < > 6.0*  6.0*   < > 6.7*   ALBUMIN 3.5 2.8* 2.5*   < > 2.0*   < > 2.2*   AST 19 17 16   < > 11   < > 20   ALT 36 49 44   < > 30   < > 14   LDH  --   --   --   --  187  --  258*    < > = values in this interval not displayed.     Hematology Studies      Recent Labs   Lab Test 04/08/21 0722 03/29/21 03/04/21 0611 03/01/21 0637 02/27/21  0853 02/25/21  0542   WBC 6.3 8.4 7.5 5.6 4.2 2.9*   ANEU 4.8  --   --   --   --  1.9   ALYM 0.7*  --   --   --   --  0.6*   SHERRI 0.8  --   --   --   --  0.4   AEOS 0.1  --   --   --   --  0.1   HGB 9.7* 10.5* 9.3* 9.5* 9.8* 9.9*   HCT 29.9* 33.4 29.6* 29.6* 30.6* 32.0*    176 237 311 268 293       Microbiology:  Last Culture results with specimen source  Culture Micro   Date Value Ref Range Status   02/19/2021   Preliminary    Culture received and in progress.  Positive AFB results are called as soon as detected.    Final report to follow in 7 to 8 weeks.     02/19/2021   Preliminary    Assayed at [facility] [place] [address] [place] [id number] [phone]   02/19/2021 Culture negative after 4 weeks  Final   02/19/2021 No growth after 4 weeks  Final   02/19/2021 Moderate growth  Staphylococcus epidermidis   (A)  Final   02/09/2021 No growth  Final   02/09/2021 No growth  Final   02/03/2021 No growth  Final   02/03/2021 No growth   Final   02/03/2021 No growth  Final   01/25/2021 No growth  Final   01/25/2021 Culture negative for acid fast bacilli  Final   01/25/2021   Final    Assayed at [facility] [address] [place] [address] [phone]   01/25/2021 No anaerobes isolated  Final   01/25/2021 Culture negative after 4 weeks  Final   01/24/2021 No growth  Final    Specimen Description   Date Value Ref Range Status   02/19/2021 Bronchoalveolar Lavage  Final   02/19/2021 Bronchoalveolar Lavage  Final   02/19/2021 Bronchoalveolar Lavage Right  Final   02/19/2021 Bronchoalveolar Lavage Right  Final   02/19/2021 Bronchoalveolar Lavage Right  Final   02/19/2021 Bronchoalveolar Lavage Right  Final   02/13/2021 Feces  Final   02/09/2021 Blood Right Arm  Final   02/09/2021 Blood Right Hand  Final   02/03/2021 Sputum  Final   02/03/2021 Sputum  Final   02/03/2021 Nares  Final   02/03/2021 Blood PICC  Final   02/03/2021 Blood Right Hand  Final   01/25/2021 Pleural fluid  Final   01/25/2021 Pleural fluid  Final   01/25/2021 Pleural fluid  Final   01/25/2021 Pleural fluid  Final   01/25/2021 Pleural fluid  Final   01/25/2021 Pleural fluid  Final        Last check of C difficile  C Diff Toxin B PCR   Date Value Ref Range Status   02/13/2021 Negative NEG^Negative Final     Infection Studies to assess Diarrhea   Recent Labs   Lab Test 12/04/18  1500 08/02/18  2253   POPRT  --  Routine parasitology exam negative  Cryptosporidium, Cyclospora, and Microsporidia are not readily detected by this method. A   single negative specimen does not rule out parasitic infection.     EPCAMP Not Detected Not Detected   EPSALM Not Detected Not Detected   EPSHGL Not Detected Not Detected   EPVIB Not Detected Not Detected   EPROTA Not Detected Not Detected   EPNORO Not Detected Not Detected   EPYER Not Detected Not Detected   GIART Negative for Giardia lamblia specific antigen by immunoassay.  --        Virology:  Coronavirus-19 testing    Recent Labs   Lab  Test 04/08/21  0723 03/04/21  1550 02/12/21  1545 02/03/21  0924 01/23/21  2327 01/23/21  2327 01/22/21  1434 10/09/20  1714   [id number] Nasopharyngeal Nasopharyngeal Nasopharyngeal Nasopharyngeal   < >  --   --   --    SARSCOVRES NEGATIVE NEGATIVE NEGATIVE NEGATIVE  --   --   --   --    [id number] Nasopharyngeal  --   --   --   --   --   --   --    COVIDPCREXT  --   --   --   --   --  Negative Negative Negative    < > = values in this interval not displayed.       Respiratory virus (non-coronavirus-19) testing    Recent Labs   Lab Test 01/24/21  1822 01/24/21  1629 12/23/20  1102   RVSPEC  --  Bronchial Bronchial   AFLU  --   --   --    IFLUA Not Detected Negative Negative   INFZA  --   --   --    FLUAH1 Not Detected Negative Negative   IC4619 Not Detected Negative Negative   FLUAH3 Not Detected Negative Negative   BFLU  --   --   --    IFLUB Not Detected Negative Negative   INFZB  --   --   --    PIV1 Not Detected Negative Negative   PIV2 Not Detected Negative Negative   PIV3 Not Detected Negative Negative   PIV4 Not Detected  --   --    HRVS  --  Negative Negative   RSVA Not Detected Negative Negative   RSVB Not Detected Negative Negative   HMPV Not Detected Negative Negative   ADVBE  --  Negative Negative   ADVC  --  Negative Negative   ADENOV Not Detected  --   --    CORONA Not Detected  --   --     < > = values in this interval not displayed.       Log IU/mL of CMVQNT   Date Value Ref Range Status   02/25/2021 Not Calculated <2.1 [Log_IU]/mL Final   01/25/2021 <2.1 <2.1 [Log_IU]/mL Final   01/24/2021 2.4 (H) <2.1 [Log_IU]/mL Final   12/23/2020 2.8 (H) <2.1 [Log_IU]/mL Final   12/09/2020 <2.1 <2.1 [Log_IU]/mL Final   09/21/2020 <2.1 <2.1 [Log_IU]/mL Final   09/09/2020 Not Calculated <2.1 [Log_IU]/mL Final   03/09/2020 <2.1 <2.1 [Log_IU]/mL Final   11/11/2019 <2.1 <2.1 [Log_IU]/mL Final   10/24/2019 Not Calculated <2.1 [Log_IU]/mL Final   10/06/2019 Not Calculated <2.1 [Log_IU]/mL Final   09/12/2019 <2.1 <2.1  [Log_IU]/mL Final   06/05/2019 <2.1 <2.1 [Log_IU]/mL Final   03/07/2019 2.7 (H) <2.1 [Log_IU]/mL Final   01/17/2019 Not Calculated <2.1 [Log_IU]/mL Final   01/16/2019 <2.1 <2.1 [Log_IU]/mL Final   12/14/2018 2.7 (H) <2.1 [Log_IU]/mL Final   12/13/2018 <2.1 <2.1 [Log_IU]/mL Final   12/04/2018 <2.1 <2.1 [Log_IU]/mL Final   11/20/2018 3.2 (H) <2.1 [Log_IU]/mL Final   11/19/2018 Not Calculated <2.1 [Log_IU]/mL Final   10/29/2018 3.8 (H) <2.1 [Log_IU]/mL Final   10/09/2018 <2.1 <2.1 [Log_IU]/mL Final   09/10/2018 <2.1 <2.1 [Log_IU]/mL Final   09/04/2018 <2.1 <2.1 [Log_IU]/mL Final   08/20/2018 2.8 (H) <2.1 [Log_IU]/mL Final   08/02/2018 6.5 (H) <2.1 [Log_IU]/mL Final   08/01/2018 3.8 (H) <2.1 [Log_IU]/mL Final   07/10/2018 4.3 (H) <2.1 [Log_IU]/mL Final   07/10/2018 4.0 (H) <2.1 [Log_IU]/mL Final     EBV DNA Copies/mL   Date Value Status   02/22/2021 75,709 (A) Final   01/25/2021 5,619 (A) Final   12/09/2020 10,686 (A) Final   09/09/2020 2,935 (A) Final   03/09/2020 8,918 (A) Final   02/19/2020 38,419 (A) Final     BK Virus Result   Date Value Status   08/07/2019 BK Virus DNA Not Detected Final       Imaging:  No results found for this or any previous visit (from the past 48 hour(s)).    Again, thank you for allowing me to participate in the care of your patient.      Sincerely,    Vinicio Layton MD

## 2021-04-13 NOTE — PROGRESS NOTES
Kecia is a 60 year old who is being evaluated via a billable video visit.      How would you like to obtain your AVS? MyChart    Will anyone else be joining your video visit? No      Video-Visit Details    Type of service:  Video Visit  Video Time: 15 minutes  Originating Location (pt. Location): Home  Distant Location (provider location):  [facility] INFECTIOUS DISEASE CLINIC [place]   Platform used for Video Visit: Threadflip  _______________________________________________________________________________________________________  [facility]  Transplant Infectious Disease Progress Note     Patient:  Kecia Blue, Date of birth 1960, Medical record number [id number]  Date of Visit:  04/13/2021         Assessment and Recommendations:   [facility]  Transplant Infectious Disease Progress Note     Patient:  Kecia Blue, Date of birth 1960, Medical record number [id number]  Date of Visit:  04/12/2021          Assessment and Recommendations:   Recommendations:  --Cont PO Posaconazole 300 mg once daily  --Cont bactrim for secondary prophylaxis.  --Plan for bronch with BAL in 3 weeks   -- If any concerns of infection, please send respiratory specimen for bacterial and fungal cx  --Repeat CT chest in 3-4 months around mid-end July    Follow up in ID clinic in ~4 months     Vinicio Layton MD  Transplant Infectious Disease  Pager [phone]     Problem List/Assessement:  1. Recent hospitalization for severe PCP pneumonia/AHRF  2. Aspergillus fumigatus lung infection   3. Stable left Aspergillus empyema  4. S/p lung transplant 3/1/2018 (CMV D+/R+, EBV D+/R+).                -Post transplant course has been complicated by right mainstem bronchial stenosis treated with serial dilations-most recently March 2019,      4. ESRD on iHD  5. Liver dysfunction-undetermined etiology  6. Prior hx of CMV viremia  7. Low level EBV viremia     Patient  presents as a follow up visit following recent hospitalization with AHRF 2/2 PCP PNA. She is clinically doing well. Continues to take bactrim  With regards to Aspergillus, she continues on posaconazole. Last CT chest in February shows stable empyema. We have had ongoing discussions with surgical teams regarding surgical interventions for source control. However, there is high concern for high morbidity with surgery and therefore will continue to manage medically at this time with long term posaconazole.         Interval History:   Transplants:  3/1/2018 (Lung).  Coordinator Mikayla Latham     HPI:     Patient is a 58 yo female with PMHx significant for ILD, seronegative rheumatoid arthritis and dermatomyositis s/p B/L lung transplantation 3/1/2018 (CMV D+/R+, EBV D+/R+). Post transplant course was complicated by                -right mainstem bronchial stenosis treated with serial dilations-most recently March 2019,                -Left sided aspergillus empyema 10/08/2019 and                -CMV viremia.   Her immunosuppression includes tacrolimus and prednisone. Her PMHx is also significant for liver dysfunction of unclear etiology with ascites and portal HTN, as well as ESRD now on iHD and being considered for renal transplant     Briefly to review relevant medical history,     --Hospitalized 10/6/19-10/12/19--admitted with 3 month hx of left sided upper abd/chest pain. 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 Underwent CT guided biopsy on 10/8/20 which yielded purulent fluid--cx positive for aspergillus fumigatus. She underwent bronchoscopy with BAL on 10/11/20 which showed septate hyphae.  Notably, she had also grown Actinomyces from multiple BAL specimens in the past. She was started on voriconazole at the time.      Patient subsequently underwent repaet CT chest on 7/18/20 at OSH and was noted to have interval increased hypodense mass like region in medial aspect of left lower  lung. As a result, patient underwent CT guided lung tissue biopsy. Histopath showed acute inflammation, necrosis and myofibroblastic proliferation with focal fungal hyphae highlighted by GMS.  Cultures positive for Aspergillus fumigatus.     1/5/21- BAL Cx with stenotrophomonas maltophilia-treated with ceftazidime x 2 weeks     1/24/21-2/25/21 hospitalized with AHRF requiring intubation 2/2 PCP pneumonia/ARDS with subsequent trach on 2/12/21. She was discharged to the acute rehab and subsequently went home on 3/5/21.     On follow up visit today, patient states she is doing well. She denies fevers/chills/sweats. No dyspnea/cough/chest pain. Has good energy and active at home. No other symptoms. She underwent bronchoscopy on 4/9/21 which showed 70% moderate stenosis of right mainstem and underwent laser assisted dilation as well as therapeutic suctioning. She states she is scheduled for another bronchoscopy in 3 weeks.    She is scheduled for bronch early next month       Review of Systems: 10 point ROS unremarkable unless stated otherwise in HPI.         Current Medications & Allergies:   Reviewed    No Known Allergies         Physical Exam:     Physical Examination: (limited exam via video)  GENERAL:  well-developed, well-nourished, in bed in no acute distress.  HEAD:  Head is normocephalic, atraumatic   EYES:  Eyes have anicteric sclerae without conjunctival injection   LUNGS:  Normal work of breathing on room air, no coughing  ABDOMEN:  Non distended  SKIN:  No acute rashes on visible areas  NEUROLOGIC:  Grossly nonfocal. Active x4 extremities         Laboratory Data:     Inflammatory Markers    Recent Labs   Lab Test 10/07/19  1221 10/06/19  1444 09/13/19  0934 09/11/19  2325 08/22/19  0820   SED 93*  --  111* 102*  --    CRP  --  25.0* 58.0* 66.0* 47.0*       Immune Globulin Studies     Recent Labs   Lab Test 01/31/21  0441 01/25/21  0406 10/27/19  0621 03/01/18  0356 02/19/18  0759     --  092 390  1,790*   IGM  --   --   --   --  502*   IGE  --  <2  --   --  <2   IGA  --   --   --   --  425*   IGG1  --   --   --   --  1,300*   IGG2  --   --   --   --  131*   IGG3  --   --   --   --  101   IGG4  --   --   --   --  1*       Metabolic Studies       Recent Labs   Lab Test 04/08/21  0722 03/29/21 02/09/21  1615 02/09/21  1615 02/04/21  0915 02/04/21 0915 02/03/21 0415 02/03/21  0415 01/24/21  1529 01/24/21  1529 01/24/21  1458 10/21/19  1752 10/21/19  1752 08/07/19  0959 08/07/19  0959    141   < > 135   < >  --    < > 133   < >  --  134   < > 133   < > 133   POTASSIUM 3.7 4.3   < > 4.3   < >  --    < > 3.3*   < >  --  3.7   < > 5.0   < > 4.2   CHLORIDE 101 101   < > 102   < >  --    < > 98   < >  --  98   < > 109   < > 98   CO2 32 25   < > 28   < >  --    < > 25   < >  --  16*   < > 10*   < > 29   ANIONGAP 5 19.3   < > 5   < >  --    < > 10   < >  --  20*   < > 13   < > 6   BUN 22 49.5  11.00   < > 20   < >  --    < > 57*   < >  --  48*   < > 66*   < > 27   CR 2.49* 4.50   < > 1.08*   < >  --    < > 3.45*   < >  --  3.39*   < > 5.76*   < > 1.80*   GFRESTIMATED 21* 10   < > 56*   < >  --    < > 14*   < >  --  14*   < > 8*   < > 31*   * 122*   < > 153*   < >  --    < > 138*   < >  --  155*   < > 138*   < > 119*   A1C  --   --   --   --   --   --   --   --   --   --  6.0*  --   --   --   --    CHELSEY 7.9* 7.9   < > 8.3*   < >  --    < > 8.9   < >  --  8.2*   < > 8.0*   < > 9.8   PHOS  --  5.8   < >  --    < >  --    < > 2.6   < >  --  6.5*   < > 6.7*   < >  --    MAG 1.5* 1.4   < >  --    < >  --    < > 2.1   < >  --  2.4*   < > 2.0   < > 2.2   LACT  --   --   --  0.5*  --   --   --   --   --  0.7  --   --   --    < >  --    PCAL  --   --   --   --   --   --   --  0.42  --   --   --   --   --    < >  --    FGTL  --   --   --   --   --  254  --   --    < >  --   --    < >  --    < >  --    CKT  --   --   --   --   --   --   --   --   --   --   --   --  70  --  36    < > = values in this interval not  displayed.       Hepatic Studies    Recent Labs   Lab Test 03/29/21 03/01/21  0637 02/27/21  0853 01/26/21  0425 01/26/21  0425 09/14/19  0533 09/14/19  0533   BILITOTAL 0.9 0.7 0.4   < > 0.8   < > 0.2   DBIL  --  0.3* 0.2   < > 0.6*   < >  --    ALKPHOS 288 339* 326*   < > 184*   < > 220*   PROTTOTAL  --  6.8 6.6*   < > 6.0*  6.0*   < > 6.7*   ALBUMIN 3.5 2.8* 2.5*   < > 2.0*   < > 2.2*   AST 19 17 16   < > 11   < > 20   ALT 36 49 44   < > 30   < > 14   LDH  --   --   --   --  187  --  258*    < > = values in this interval not displayed.     Hematology Studies      Recent Labs   Lab Test 04/08/21  0722 03/29/21 03/04/21  0611 03/01/21  0637 02/27/21  0853 02/25/21  0542   WBC 6.3 8.4 7.5 5.6 4.2 2.9*   ANEU 4.8  --   --   --   --  1.9   ALYM 0.7*  --   --   --   --  0.6*   SHERRI 0.8  --   --   --   --  0.4   AEOS 0.1  --   --   --   --  0.1   HGB 9.7* 10.5* 9.3* 9.5* 9.8* 9.9*   HCT 29.9* 33.4 29.6* 29.6* 30.6* 32.0*    176 237 311 268 293       Microbiology:  Last Culture results with specimen source  Culture Micro   Date Value Ref Range Status   02/19/2021   Preliminary    Culture received and in progress.  Positive AFB results are called as soon as detected.    Final report to follow in 7 to 8 weeks.     02/19/2021   Preliminary    Assayed at [facility] [address] [place] [id number] [phone]   02/19/2021 Culture negative after 4 weeks  Final   02/19/2021 No growth after 4 weeks  Final   02/19/2021 Moderate growth  Staphylococcus epidermidis   (A)  Final   02/09/2021 No growth  Final   02/09/2021 No growth  Final   02/03/2021 No growth  Final   02/03/2021 No growth  Final   02/03/2021 No growth  Final   01/25/2021 No growth  Final   01/25/2021 Culture negative for acid fast bacilli  Final   01/25/2021   Final    Assayed at [facility] [address] [place] [id number] [phone]   01/25/2021 No anaerobes isolated  Final   01/25/2021 Culture negative after 4 weeks  Final    01/24/2021 No growth  Final    Specimen Description   Date Value Ref Range Status   02/19/2021 Bronchoalveolar Lavage  Final   02/19/2021 Bronchoalveolar Lavage  Final   02/19/2021 Bronchoalveolar Lavage Right  Final   02/19/2021 Bronchoalveolar Lavage Right  Final   02/19/2021 Bronchoalveolar Lavage Right  Final   02/19/2021 Bronchoalveolar Lavage Right  Final   02/13/2021 Feces  Final   02/09/2021 Blood Right Arm  Final   02/09/2021 Blood Right Hand  Final   02/03/2021 Sputum  Final   02/03/2021 Sputum  Final   02/03/2021 Nares  Final   02/03/2021 Blood PICC  Final   02/03/2021 Blood Right Hand  Final   01/25/2021 Pleural fluid  Final   01/25/2021 Pleural fluid  Final   01/25/2021 Pleural fluid  Final   01/25/2021 Pleural fluid  Final   01/25/2021 Pleural fluid  Final   01/25/2021 Pleural fluid  Final        Last check of C difficile  C Diff Toxin B PCR   Date Value Ref Range Status   02/13/2021 Negative NEG^Negative Final     Infection Studies to assess Diarrhea   Recent Labs   Lab Test 12/04/18  1500 08/02/18  2253   POPRT  --  Routine parasitology exam negative  Cryptosporidium, Cyclospora, and Microsporidia are not readily detected by this method. A   single negative specimen does not rule out parasitic infection.     EPCAMP Not Detected Not Detected   EPSALM Not Detected Not Detected   EPSHGL Not Detected Not Detected   EPVIB Not Detected Not Detected   EPROTA Not Detected Not Detected   EPNORO Not Detected Not Detected   EPYER Not Detected Not Detected   GIART Negative for Giardia lamblia specific antigen by immunoassay.  --        Virology:  Coronavirus-19 testing    Recent Labs   Lab Test 04/08/21  0723 03/04/21  1550 02/12/21  1545 02/03/21  0924 01/23/21  2327 01/23/21  2327 01/22/21  1434 10/09/20  1714   [id number] Nasopharyngeal Nasopharyngeal Nasopharyngeal Nasopharyngeal   < >  --   --   --    SARSCOVRES NEGATIVE NEGATIVE NEGATIVE NEGATIVE  --   --   --   --    [id number] Nasopharyngeal  --    --   --   --   --   --   --    COVIDPCREXT  --   --   --   --   --  Negative Negative Negative    < > = values in this interval not displayed.       Respiratory virus (non-coronavirus-19) testing    Recent Labs   Lab Test 01/24/21  1822 01/24/21  1629 12/23/20  1102   RVSPEC  --  Bronchial Bronchial   AFLU  --   --   --    IFLUA Not Detected Negative Negative   INFZA  --   --   --    FLUAH1 Not Detected Negative Negative   DX8511 Not Detected Negative Negative   FLUAH3 Not Detected Negative Negative   BFLU  --   --   --    IFLUB Not Detected Negative Negative   INFZB  --   --   --    PIV1 Not Detected Negative Negative   PIV2 Not Detected Negative Negative   PIV3 Not Detected Negative Negative   PIV4 Not Detected  --   --    HRVS  --  Negative Negative   RSVA Not Detected Negative Negative   RSVB Not Detected Negative Negative   HMPV Not Detected Negative Negative   ADVBE  --  Negative Negative   ADVC  --  Negative Negative   ADENOV Not Detected  --   --    CORONA Not Detected  --   --     < > = values in this interval not displayed.       Log IU/mL of CMVQNT   Date Value Ref Range Status   02/25/2021 Not Calculated <2.1 [Log_IU]/mL Final   01/25/2021 <2.1 <2.1 [Log_IU]/mL Final   01/24/2021 2.4 (H) <2.1 [Log_IU]/mL Final   12/23/2020 2.8 (H) <2.1 [Log_IU]/mL Final   12/09/2020 <2.1 <2.1 [Log_IU]/mL Final   09/21/2020 <2.1 <2.1 [Log_IU]/mL Final   09/09/2020 Not Calculated <2.1 [Log_IU]/mL Final   03/09/2020 <2.1 <2.1 [Log_IU]/mL Final   11/11/2019 <2.1 <2.1 [Log_IU]/mL Final   10/24/2019 Not Calculated <2.1 [Log_IU]/mL Final   10/06/2019 Not Calculated <2.1 [Log_IU]/mL Final   09/12/2019 <2.1 <2.1 [Log_IU]/mL Final   06/05/2019 <2.1 <2.1 [Log_IU]/mL Final   03/07/2019 2.7 (H) <2.1 [Log_IU]/mL Final   01/17/2019 Not Calculated <2.1 [Log_IU]/mL Final   01/16/2019 <2.1 <2.1 [Log_IU]/mL Final   12/14/2018 2.7 (H) <2.1 [Log_IU]/mL Final   12/13/2018 <2.1 <2.1 [Log_IU]/mL Final   12/04/2018 <2.1 <2.1 [Log_IU]/mL Final    11/20/2018 3.2 (H) <2.1 [Log_IU]/mL Final   11/19/2018 Not Calculated <2.1 [Log_IU]/mL Final   10/29/2018 3.8 (H) <2.1 [Log_IU]/mL Final   10/09/2018 <2.1 <2.1 [Log_IU]/mL Final   09/10/2018 <2.1 <2.1 [Log_IU]/mL Final   09/04/2018 <2.1 <2.1 [Log_IU]/mL Final   08/20/2018 2.8 (H) <2.1 [Log_IU]/mL Final   08/02/2018 6.5 (H) <2.1 [Log_IU]/mL Final   08/01/2018 3.8 (H) <2.1 [Log_IU]/mL Final   07/10/2018 4.3 (H) <2.1 [Log_IU]/mL Final   07/10/2018 4.0 (H) <2.1 [Log_IU]/mL Final     EBV DNA Copies/mL   Date Value Status   02/22/2021 75,709 (A) Final   01/25/2021 5,619 (A) Final   12/09/2020 10,686 (A) Final   09/09/2020 2,935 (A) Final   03/09/2020 8,918 (A) Final   02/19/2020 38,419 (A) Final     BK Virus Result   Date Value Status   08/07/2019 BK Virus DNA Not Detected Final       Imaging:  No results found for this or any previous visit (from the past 48 hour(s)).

## 2021-04-14 ENCOUNTER — TELEPHONE (OUTPATIENT)
Dept: PULMONOLOGY | Facility: CLINIC | Age: 61
End: 2021-04-14

## 2021-04-14 NOTE — TELEPHONE ENCOUNTER
Spoke with patient to schedule procedure with Dr. Mati Norris   Procedure was scheduled on 04/30 at [facility] OR  Patient will have H&P with Pulmonary/Transplant team     Patient is aware a COVID-19 test is needed before their procedure. The test should be with-in 4 days of their procedure.   Test Details: TBD    Patient is aware a / is needed day of surgery.   Surgery letter was sent via [employer], patient has my direct contact information for any further questions.

## 2021-04-15 DIAGNOSIS — Z94.2 S/P LUNG TRANSPLANT (H): Primary | ICD-10-CM

## 2021-04-15 DIAGNOSIS — J90 LOCULATED PLEURAL EFFUSION: ICD-10-CM

## 2021-04-15 NOTE — TELEPHONE ENCOUNTER
"Lung Transplant Conference      Patient Name: Kecia Blue    Reason for conference discussion (brief overview): 61 yo s/p lung transplant.   She had a persistent Aspergillus empyema, IR helped Dr. Marvin access the left pleural space and we placed antibiotic beads.  It looks like she is doing better now and we have negative cultures from that space however, the loculated effusion is still there. I wanted to place a tube (14-16 Fr), drain the fluid and send it for cultures again, and possibly pleurodese her through the tube to obliterate that space.       This is tentatively scheduled 4/29 with IR.   She is also scheduled 4/30 with Dr. Norris for a bronchoscopy and possible dilation/ poss stent/ tissue debulking.    Specific Question:  Can bronch/chest tube be done in same setting by Dr. Norris?    If pleurodesis is \"possible\", will it be at same time tube is placed, therefore requiring admission?   Patient needs to know plan    Pertinent Histology:     Referring Physician: Braden Marvin and Mati Norris    The patient's case was presented at the multidisciplinary conference for the above noted reason.  There was a consensus recommendation for the following actions:     It was consensus of group that IR should proceed to do the chest tube placement/drainage, as scheduled 4/29, with Dr. Norris doing bronchoscopy the following day.   It is not something that Dr. Norris can incorporate into his procedure and will require CT guidance to place this chest tube.  Pleurodesis will not occur in this setting but may be considered later, depending on patients' response.          Case Lead:  ADRIÁN Scanlon, CNS    Interventional Radiology Staff Present: JONI Malagon will send this information to patient via [employer]      "

## 2021-04-15 NOTE — PROGRESS NOTES
Outpatient IR Referral    Patient is a 60 year old female with PMHx significant for HTN, ESRD on HD, ILD, seronegative rheumatoid arthritis and dermatomyositis s/p B/L lung transplantation 3/1/2018 (CMV D+/R+, EBV D+/R+). Course complicated by aspergillus empyema s/p antibiotic bead instillation with IR/thoracic surgery into the left chest 11/2020. IR is now asked to place a left sided (14-16F) chest tube into the loculated pleural effusion for drainage and culture and for possible pleurodesis. Patient would be admitted to pulmonary transplant after chest tube placement. Dr. Marvin is requesting. Dr. Graham from pulmonary is aware.    Case and imaging was reviewed with Dr. Mckeon from IR and he has approved CT guided left chest tube placement. Fluid should be sent for cultures.    Procedure order placed. IR scheduling updated. RNCC and  will work with pt and Dr. Mckeon's schedule to coordinate.     ADRIÁN Oneill CNP  Interventional Radiology   IR on-call pager: [phone]

## 2021-04-16 ENCOUNTER — TEAM CONFERENCE (OUTPATIENT)
Dept: SURGERY | Facility: CLINIC | Age: 61
End: 2021-04-16

## 2021-04-16 DIAGNOSIS — Z11.59 ENCOUNTER FOR SCREENING FOR OTHER VIRAL DISEASES: ICD-10-CM

## 2021-04-16 DIAGNOSIS — J86.9 EMPYEMA OF LUNG (H): Primary | ICD-10-CM

## 2021-04-17 LAB
MYCOBACTERIUM SPEC CULT: NORMAL
MYCOBACTERIUM SPEC CULT: NORMAL
SPECIMEN SOURCE: NORMAL

## 2021-04-20 ENCOUNTER — HOSPITAL ENCOUNTER (OUTPATIENT)
Facility: CLINIC | Age: 61
Setting detail: SPECIMEN
Discharge: HOME | End: 2021-04-20
Admitting: PHYSICIAN ASSISTANT
Payer: COMMERCIAL

## 2021-04-20 PROCEDURE — 80197 ASSAY OF TACROLIMUS: CPT | Performed by: PHYSICIAN ASSISTANT

## 2021-04-21 ENCOUNTER — VIRTUAL VISIT (OUTPATIENT)
Dept: CARDIOLOGY | Facility: CLINIC | Age: 61
End: 2021-04-21
Attending: INTERNAL MEDICINE
Payer: COMMERCIAL

## 2021-04-21 DIAGNOSIS — Z94.2 S/P LUNG TRANSPLANT (H): ICD-10-CM

## 2021-04-21 DIAGNOSIS — I27.20 PULMONARY HYPERTENSION (H): ICD-10-CM

## 2021-04-21 DIAGNOSIS — I48.92 ATRIAL FLUTTER, UNSPECIFIED TYPE (H): ICD-10-CM

## 2021-04-21 DIAGNOSIS — I48.0 PAROXYSMAL ATRIAL FIBRILLATION (H): Primary | ICD-10-CM

## 2021-04-21 PROCEDURE — 99214 OFFICE O/P EST MOD 30 MIN: CPT | Mod: 95 | Performed by: INTERNAL MEDICINE

## 2021-04-21 NOTE — LETTER
4/21/2021      RE: Kecia Blue  [address] [place] [address]       Dear Colleague,    Thank you for the opportunity to participate in the care of your patient, Kecia Blue, at the [facility] [place] [facility]. Please see a copy of my visit note below.    Kecia Blue is a 60 year old who is being evaluated via a billable video visit.      How would you like to obtain your AVS? MyChart  If the video visit is dropped, the invitation should be resent by: Text to cell phone: [phone]  Will anyone else be joining your video visit? No      Vitals - Patient Reported  Pain Score: No Pain (0)(No SOB)    Vitals were taken and medications reconciled.    Uli Clayton, EMT  8:01 AM    CC: Hospital follow up for rapid atrial fibrillation in the setting of acute hypoxic respiratory failure.    HPI: Patient is a 60 year old female with a PMH most remarkable for ILD (status post bilateral lung transplant in 2018), ESRD (on dialysis TID), history of surgical related DVT (not on anticoagulation anymore), and paroxysmal atrial fibrillation in the setting of acute illness. She was recently hospitalized earlier this year for acute hypoxic respiratory failure which developed into ARDS. At that time she had several runs of rapid atrial fibrillation. She was evaluated in the hospital by the inpatient electrophysiology team. The inpatient team advised treatment of the underlying respiratory failure as it was thought that this was the driving factor behind her atrial fibrillation and is now presenting for follow-up.    Patient notes that since she has been discharged from the hospital she has been feeling well. She has no symptoms of palpitations or rapid heart rate. No dizziness, syncope, or light-headedness. No pre-syncope.     PAST MEDICAL HISTORY:  Past Medical History:   Diagnosis Date     Acute on chronic respiratory failure with  hypoxia (H) 02/21/2018     Anisocoria      Antisynthetase syndrome (H) 2014     Anxiety      Aspergillus (H)      Aspergillus pneumonia (H) 11/20/2020     Benign essential hypertension      C. difficile colitis      Cytomegalovirus (CMV) viremia (H)      Dermatomyositis (H)      Dysplasia of cervix, low grade (ESTRADA 1)      EBV (Franklin-Barr virus) viremia      ESRD (end stage renal disease) on dialysis (H)      ILD (interstitial lung disease) (H)      Lung replaced by transplant (H)      Osteopenia      Paroxysmal atrial fibrillation (H)      Pneumocystis jiroveci pneumonia (H)      PONV (postoperative nausea and vomiting)      Pulmonary hypertension (H)      Raynaud's disease      Seronegative rheumatoid arthritis (H)        CURRENT MEDICATIONS:  Current Outpatient Medications   Medication Sig Dispense Refill     acetaminophen (TYLENOL) 325 MG tablet Take 1 tablet (325 mg) by mouth every 4 hours as needed for mild pain or fever 60 tablet      albuterol (PROVENTIL) (2.5 MG/3ML) 0.083% neb solution Take 1 vial (2.5 mg) by nebulization 2 times daily 360 mL 0     blood glucose (NO BRAND SPECIFIED) test strip Use to test blood sugar 4 times daily or as directed. 120 strip 0     blood glucose (NO BRAND SPECIFIED) test strip Use to test blood sugar 4 times daily or as directed. 120 strip 0     budesonide (PULMICORT) 0.5 MG/2ML neb solution Take 2 mLs (0.5 mg) by nebulization 2 times daily 2 mL 3     fluticasone (FLONASE) 50 MCG/ACT nasal spray Spray 1 spray into both nostrils daily 15.8 mL 0     Magnesium Cl-Calcium Carbonate (SLOW-MAG) 71.5-119 MG TBEC Take 3 tablets by mouth four times a week Take on non dialysis days T/Th/Sa/Su 90 tablet 3     metoprolol tartrate (LOPRESSOR) 25 MG tablet 1 tablet (25 mg) by Oral or Feeding Tube route 2 times daily 60 tablet 0     multivitamin RENAL (RENAVITE RX/NEPHROVITE) 1 MG tablet Take 1 tablet by mouth daily 30 tablet 11     ondansetron (ZOFRAN-ODT) 4 MG ODT tab Take 1 tablet (4  mg) by mouth every 6 hours as needed for nausea or vomiting 12 tablet 0     pantoprazole (PROTONIX) 40 MG EC tablet Take 1 tablet (40 mg) by mouth every morning (before breakfast) 30 tablet 0     posaconazole (NOXAFIL) 100 MG EC tablet Take 3 tablets (300 mg) by mouth daily 270 tablet 0     predniSONE (DELTASONE) 2.5 MG tablet Take 1 tablet (2.5 mg) by mouth every evening 30 tablet 11     predniSONE (DELTASONE) 5 MG tablet Take 1 tablet (5 mg) by mouth every morning 30 tablet 11     sulfamethoxazole-trimethoprim (BACTRIM) 400-80 MG tablet Take 1 tablet by mouth Every Mon, Wed, Fri Morning 13 tablet 11     tacrolimus (GENERIC EQUIVALENT) 0.5 MG capsule Take 1 capsule (0.5 mg) by mouth every morning Total dose: 0.5 mg in the AM and 1 mg in the PM 30 capsule 11     tacrolimus (GENERIC EQUIVALENT) 1 MG capsule Take 1 capsule (1 mg) by mouth every evening Total dose: 0.5 mg in the AM and 1 mg in the PM 30 capsule 11       PAST SURGICAL HISTORY:  Past Surgical History:   Procedure Laterality Date     BRONCHOSCOPY (RIGID OR FLEXIBLE), DIAGNOSTIC N/A 4/10/2018    Procedure: COMBINED BRONCHOSCOPY (RIGID OR FLEXIBLE), LAVAGE;;  Surgeon: Mariposa Donohue MD;  Location:  GI     BRONCHOSCOPY (RIGID OR FLEXIBLE), DIAGNOSTIC N/A 12/23/2020    Procedure: BRONCHOSCOPY, WITH BRONCHOALVEOLAR LAVAGE;  Surgeon: Uri Bass MD;  Location:  GI     BRONCHOSCOPY (RIGID OR FLEXIBLE), DILATE BRONCHUS / TRACHEA N/A 10/11/2018    Procedure: BRONCHOSCOPY (RIGID OR FLEXIBLE), DILATE BRONCHUS / TRACHEA;  Flexible And Rigid Bronchoscopy and Dilation;  Surgeon: Wilber Lin MD;  Location: [facility]     BRONCHOSCOPY FLEXIBLE N/A 3/13/2018    Procedure: BRONCHOSCOPY FLEXIBLE;  Flexible Bronchoscopy ;  Surgeon: Gissell Sanchez MD;  Location:  GI     BRONCHOSCOPY FLEXIBLE N/A 5/9/2018    Procedure: BRONCHOSCOPY FLEXIBLE;;  Surgeon: Wilber Lin MD;  Location:  GI     BRONCHOSCOPY FLEXIBLE AND RIGID N/A 9/10/2018     Procedure: BRONCHOSCOPY FLEXIBLE AND RIGID;  Flexible and Rigid Bronchoscopy with Balloon Dilation, tissue debulking with cryo, and Right mainstem bronchus stent placement;  Surgeon: Wilber Lin MD;  Location: [facility]     BRONCHOSCOPY RIGID N/A 6/6/2018    Procedure: BRONCHOSCOPY RIGID;;  Surgeon: Lopez Macias MD;  Location: [facility]     BRONCHOSCOPY, DILATE BRONCHUS, STENT BRONCHUS, COMBINED N/A 6/11/2018    Procedure: COMBINED BRONCHOSCOPY, DILATE BRONCHUS, STENT BRONCHUS;  Flexible Bronchoscopy, Balloon Dilation, Bronchial Washings;  Surgeon: Wilber Lin MD;  [facility]     BRONCHOSCOPY, DILATE BRONCHUS, STENT BRONCHUS, COMBINED Right 7/10/2018    Procedure: COMBINED BRONCHOSCOPY, DILATE BRONCHUS, STENT BRONCHUS;  Flexible Bronchoscopy, Balloon Dilation, Bronchial Washings  ;  Surgeon: Wilber Lin MD;  [facility]     BRONCHOSCOPY, DILATE BRONCHUS, STENT BRONCHUS, COMBINED N/A 8/2/2018    Procedure: COMBINED BRONCHOSCOPY, DILATE BRONCHUS, STENT BRONCHUS;  Flexible Bronchoscopy, Bronchial Washings, Balloon Dilation;  Surgeon: Wilber Lin MD;  [facility]     BRONCHOSCOPY, DILATE BRONCHUS, STENT BRONCHUS, COMBINED N/A 8/20/2018    Procedure: COMBINED BRONCHOSCOPY, DILATE BRONCHUS, STENT BRONCHUS;  Flexible Bronchoscopy, Balloon Dilation;  Surgeon: Wilber Lin MD;  [facility]     BRONCHOSCOPY, DILATE BRONCHUS, STENT BRONCHUS, COMBINED N/A 10/29/2018    Procedure: Flexible Bronchoscopy, Balloon Dilation, Stent Revision, Airway Examination And Therapeutic Suctioning, Cyro Tumor Debulking;  Surgeon: Wilber Lin MD;  [facility]     BRONCHOSCOPY, DILATE BRONCHUS, STENT BRONCHUS, COMBINED N/A 11/20/2018    Procedure: Rigid Bronchoscopy, Stent Removal and dilitation;  Surgeon: Wilber Lin MD;  [facility]     BRONCHOSCOPY, DILATE BRONCHUS, STENT BRONCHUS, COMBINED N/A 12/14/2018    Procedure: Flexible And Rigid  Bronchoscopy, Balloon Dilation, Bronchial Washing;  Surgeon: Wilber Lin MD;  [facility]     BRONCHOSCOPY, DILATE BRONCHUS, STENT BRONCHUS, COMBINED N/A 1/17/2019    Procedure: Flexible And Rigid Bronchoscopy, Balloon Dilation.;  Surgeon: Wilber Lin MD;  [facility]     BRONCHOSCOPY, DILATE BRONCHUS, STENT BRONCHUS, COMBINED N/A 3/7/2019    Procedure: Flexible and Rigid Bronchoscopy, Bronchial Washing, Balloon Dilation;  Surgeon: Wilber Lin MD;  [facility]     BRONCHOSCOPY, DILATE BRONCHUS, STENT BRONCHUS, COMBINED N/A 6/6/2019    Procedure: Rigid and Flexible Bronchoscopy, Balloon Dilation;  Surgeon: Wilber Lin MD;  [facility]     BRONCHOSCOPY, DILATE BRONCHUS, STENT BRONCHUS, COMBINED N/A 10/11/2019    Procedure: Flexible and Rigid Bronchoscopy, Balloon Dilation, Bronchoalveolar Lagave;  Surgeon: Wilber Lin MD;  [facility]     BRONCHOSCOPY, DILATE BRONCHUS, STENT BRONCHUS, COMBINED N/A 2/19/2021    Procedure: BRONCHOSCOPY, flexible, airway dilation, and bronchial wash;  Surgeon: Wilber Lin MD;  [facility]     BRONCHOSCOPY, DILATE BRONCHUS, STENT BRONCHUS, COMBINED N/A 4/9/2021    Procedure: BRONCHOSCOPY, flexible and rigid, Airway suctioning;  Surgeon: Mati Norris MD;  [facility]     CV RIGHT HEART CATH MEASUREMENTS RECORDED N/A 3/10/2020    Procedure: CV RIGHT HEART CATH;  Surgeon: Wai Garcia MD;  [facility]     ENT SURGERY      tonsillectomy as a child     ESOPHAGOSCOPY, GASTROSCOPY, DUODENOSCOPY (EGD), COMBINED N/A 10/29/2018    Procedure: COMBINED ESOPHAGOSCOPY, GASTROSCOPY, DUODENOSCOPY (EGD) with biopsies and polypectomy;  Surgeon: Chente Bloom MD;  [facility]     INSERT EXTRACORPORAL MEMBRANE OXYGENATOR ADULT (OUTSIDE OR) N/A 2/27/2018    Procedure: INSERT EXTRACORPORAL MEMBRANE OXYGENATOR ADULT (OUTSIDE OR);  INSERT EXTRACORPORAL MEMBRANE  OXYGENATOR ADULT (OUTSIDE OR) ;  Surgeon: Toby Hernandez MD;  [facility]     IR CVC TUNNEL PLACEMENT > 5 YRS OF AGE  10/25/2019     IR DIALYSIS FISTULOGRAM LEFT  3/2/2021     IR DIALYSIS MECH THROMB, PTA  3/2/2021     IR GASTRO JEJUNOSTOMY TUBE PLACEMENT  2/16/2021     IR PARACENTESIS  1/8/2020     IR THORACENTESIS  9/13/2019     IR TRANSCATHETER BIOPSY  1/8/2020     no prior surgery       REMOVE EXTRACORPORAL MEMBRANE OXYGENATOR ADULT N/A 3/3/2018    Procedure: REMOVE EXTRACORPORAL MEMBRANE OXYGENATOR ADULT;  Removal of Right Femoral Venous and Right Axillary Arterial Extracorporeal Membrane Oxygenator;  Surgeon: Toby Hernandez MD;  [facility]     TRANSPLANT LUNG RECIPIENT SINGLE X2 Bilateral 3/1/2018    Procedure: TRANSPLANT LUNG RECIPIENT SINGLE X2;  Median Sternotomy, Extracorporeal Membrane Oxygenator, bilateral sequential lung transplant;  Surgeon: Toby Hernandez MD;  Location: [facility]       ALLERGIES:   No Known Allergies    FAMILY HISTORY:  No Premature coronary artery disease  No Atrial fibrillation  No Sudden cardiac death     SOCIAL HISTORY:  Social History     Tobacco Use     Smoking status: Never Smoker     Smokeless tobacco: Never Used   Substance Use Topics     Alcohol use: No     Alcohol/week: 1.0 standard drinks     Types: 1 Glasses of wine per week     Drug use: No       ROS:   Constitutional: No fever, chills, or sweats. Weight stable.   ENT: No visual disturbance, ear ache, epistaxis, sore throat.   Cardiovascular: As per HPI.   Respiratory: No cough, hemoptysis.    GI: No nausea, vomiting, hematemesis, melena, or hematochezia.   : No hematuria.   Integument: Negative.   Psychiatric: Negative.   Hematologic:  Easy bruising, no easy bleeding.  Neuro: Negative.   Endocrinology: No significant heat or cold intolerance   Musculoskeletal: No myalgia.    Exam:  No vitals were done due to remote visit (coronavirus pandemic)    GENERAL APPEARANCE: healthy,  alert and no distress  CARDIAC: No visible JVD  RESPIRATORY: breathing comfortably, no use of accessory muscles, no retractions, respirations are unlabored, normal respiratory rate  ABDOMEN: no visible hepatosplenomegaly  EXTREMITIES: no visible no edema  NEURO: alert and oriented to person/place/time, normal speech and affect  SKIN: no visible ecchymoses, no rashes      Labs:  CBC RESULTS:   Lab Results   Component Value Date    WBC 6.3 04/08/2021    RBC 2.98 (L) 04/08/2021    HGB 9.7 (L) 04/08/2021    HCT 29.9 (L) 04/08/2021     04/08/2021    MCH 32.6 04/08/2021    MCHC 32.4 04/08/2021    RDW 15.4 (H) 04/08/2021     04/08/2021       BMP RESULTS:  Lab Results   Component Value Date     04/08/2021    POTASSIUM 3.7 04/08/2021    CHLORIDE 101 04/08/2021    CO2 32 04/08/2021    ANIONGAP 5 04/08/2021     (H) 04/08/2021    BUN 22 04/08/2021    CR 2.49 (H) 04/08/2021    GFRESTIMATED 21 (L) 04/08/2021    GFRESTBLACK 24 (L) 04/08/2021    CHELSEY 7.9 (L) 04/08/2021        INR RESULTS:  Lab Results   Component Value Date    INR 1.04 04/08/2021    INR 0.99 02/19/2021    INR 1.07 02/11/2021    INR 1.02 12/23/2020       Procedures:    Reviewed echocardiogram from 1/25/2021 which demonstrated normal biventricular function (EF of 60 - 65%), no valvular abnormalities.    Reviewed Ziopatch study from 3/18/2021 which demonstrated frequent runs of asymptomatic SVT reflecting 1.6% of all heart beats.    Assessment and Plan:     Patient is a 60 year old female with a PMH most remarkable for ILD (status post bilateral lung transplant in 2018), ESRD (on dialysis TID), history of surgical related DVT (not on anticoagulation anymore), and paroxysmal atrial fibrillation in the setting of acute illness. She presents today for hospital follow up.    1. Paroxysmal Atrial Fibrillation (CHADSVASC of 1; Sex+). Patient has had a few documented cases of atrial fibrillation typically under physiological stress. The first was  after she had her lung transplants in 2018, the second when she was admitted to the hospital with ARDS. No structural abnormalities in her echocardiogram. It appears both of these times she was asymptomatic. Based upon her ziopatch study she is not having runs of atrial fibrillation.  - Patient has a CHADSVASC of 1; no indication for anticoagulation  - Continue current metoprolol dose of 25 mg daily    2. Frequent runs of SVT. Patient has frequent runs of asymptomatic SVT, representing 1.6% of heart beats on her most recent ambulatory telemetry study. Discussed with the patient that these are occurring, but they are not something that is particular concerning with a lack of symptoms. For now we will continue to watch.  - Continue metoprolol 25 mg daily     Patient has upcoming follow up with Dr. Schaffer. Will recommend she continue to follow up with him. She can see us as needed.    Patient was seen and discussed with the attending physician, Dr. Jermaine Mcmahan, who agrees with my assessment and plan.    Jamaal Gill MD PhD  Cardiology Fellow  P: Text Page     EP STAFF NOTE  Patient seen and examined and management plan personally reviewed with the patient. I agree with the note above by the CV/EP fellow.  Jermaine Mcmahan MD [facility]  Cardiology - Electrophysiology

## 2021-04-21 NOTE — PROGRESS NOTES
Kecia Blue is a 60 year old who is being evaluated via a billable video visit.      How would you like to obtain your AVS? MyChart  If the video visit is dropped, the invitation should be resent by: Text to cell phone: [phone]  Will anyone else be joining your video visit? No      Vitals - Patient Reported  Pain Score: No Pain (0)(No SOB)    Vitals were taken and medications reconciled.    Uli Clayton, EMT  8:01 AM

## 2021-04-21 NOTE — PATIENT INSTRUCTIONS
You were seen virtually in Electrophysiology today by: Dr Mcmahan    Plan:   Follow up as needed      Your Care Team:  EP Cardiology   Telephone Number     Coco Hardy RN [phone]     For scheduling appts or procedures:    Lilly Santa   [phone]   For the Device Clinic (Pacemakers, ICDs, Loop Recorders)    During business hours: [phone]  After business hours:   [phone]- select option 4 and ask for job code [phone].       Cardiovascular Clinic:   [address] [place] [address]. [place] [address]      As always, Thank you for trusting us with your health care needs!

## 2021-04-21 NOTE — PROGRESS NOTES
CC: Hospital follow up for rapid atrial fibrillation in the setting of acute hypoxic respiratory failure.    HPI: Patient is a 60 year old female with a PMH most remarkable for ILD (status post bilateral lung transplant in 2018), ESRD (on dialysis TID), history of surgical related DVT (not on anticoagulation anymore), and paroxysmal atrial fibrillation in the setting of acute illness. She was recently hospitalized earlier this year for acute hypoxic respiratory failure which developed into ARDS. At that time she had several runs of rapid atrial fibrillation. She was evaluated in the hospital by the inpatient electrophysiology team. The inpatient team advised treatment of the underlying respiratory failure as it was thought that this was the driving factor behind her atrial fibrillation and is now presenting for follow-up.    Patient notes that since she has been discharged from the hospital she has been feeling well. She has no symptoms of palpitations or rapid heart rate. No dizziness, syncope, or light-headedness. No pre-syncope.     PAST MEDICAL HISTORY:  Past Medical History:   Diagnosis Date     Acute on chronic respiratory failure with hypoxia (H) 02/21/2018     Anisocoria      Antisynthetase syndrome (H) 2014     Anxiety      Aspergillus (H)      Aspergillus pneumonia (H) 11/20/2020     Benign essential hypertension      C. difficile colitis      Cytomegalovirus (CMV) viremia (H)      Dermatomyositis (H)      Dysplasia of cervix, low grade (ESTRADA 1)      EBV (Franklin-Barr virus) viremia      ESRD (end stage renal disease) on dialysis (H)      ILD (interstitial lung disease) (H)      Lung replaced by transplant (H)      Osteopenia      Paroxysmal atrial fibrillation (H)      Pneumocystis jiroveci pneumonia (H)      PONV (postoperative nausea and vomiting)      Pulmonary hypertension (H)      Raynaud's disease      Seronegative rheumatoid arthritis (H)        CURRENT MEDICATIONS:  Current Outpatient Medications    Medication Sig Dispense Refill     acetaminophen (TYLENOL) 325 MG tablet Take 1 tablet (325 mg) by mouth every 4 hours as needed for mild pain or fever 60 tablet      albuterol (PROVENTIL) (2.5 MG/3ML) 0.083% neb solution Take 1 vial (2.5 mg) by nebulization 2 times daily 360 mL 0     blood glucose (NO BRAND SPECIFIED) test strip Use to test blood sugar 4 times daily or as directed. 120 strip 0     blood glucose (NO BRAND SPECIFIED) test strip Use to test blood sugar 4 times daily or as directed. 120 strip 0     budesonide (PULMICORT) 0.5 MG/2ML neb solution Take 2 mLs (0.5 mg) by nebulization 2 times daily 2 mL 3     fluticasone (FLONASE) 50 MCG/ACT nasal spray Spray 1 spray into both nostrils daily 15.8 mL 0     Magnesium Cl-Calcium Carbonate (SLOW-MAG) 71.5-119 MG TBEC Take 3 tablets by mouth four times a week Take on non dialysis days T/Th/Sa/Su 90 tablet 3     metoprolol tartrate (LOPRESSOR) 25 MG tablet 1 tablet (25 mg) by Oral or Feeding Tube route 2 times daily 60 tablet 0     multivitamin RENAL (RENAVITE RX/NEPHROVITE) 1 MG tablet Take 1 tablet by mouth daily 30 tablet 11     ondansetron (ZOFRAN-ODT) 4 MG ODT tab Take 1 tablet (4 mg) by mouth every 6 hours as needed for nausea or vomiting 12 tablet 0     pantoprazole (PROTONIX) 40 MG EC tablet Take 1 tablet (40 mg) by mouth every morning (before breakfast) 30 tablet 0     posaconazole (NOXAFIL) 100 MG EC tablet Take 3 tablets (300 mg) by mouth daily 270 tablet 0     predniSONE (DELTASONE) 2.5 MG tablet Take 1 tablet (2.5 mg) by mouth every evening 30 tablet 11     predniSONE (DELTASONE) 5 MG tablet Take 1 tablet (5 mg) by mouth every morning 30 tablet 11     sulfamethoxazole-trimethoprim (BACTRIM) 400-80 MG tablet Take 1 tablet by mouth Every Mon, Wed, Fri Morning 13 tablet 11     tacrolimus (GENERIC EQUIVALENT) 0.5 MG capsule Take 1 capsule (0.5 mg) by mouth every morning Total dose: 0.5 mg in the AM and 1 mg in the PM 30 capsule 11     tacrolimus  (GENERIC EQUIVALENT) 1 MG capsule Take 1 capsule (1 mg) by mouth every evening Total dose: 0.5 mg in the AM and 1 mg in the PM 30 capsule 11       PAST SURGICAL HISTORY:  Past Surgical History:   Procedure Laterality Date     BRONCHOSCOPY (RIGID OR FLEXIBLE), DIAGNOSTIC N/A 4/10/2018    Procedure: COMBINED BRONCHOSCOPY (RIGID OR FLEXIBLE), LAVAGE;;  Surgeon: Mariposa Donohue MD;  [facility]     BRONCHOSCOPY (RIGID OR FLEXIBLE), DIAGNOSTIC N/A 12/23/2020    Procedure: BRONCHOSCOPY, WITH BRONCHOALVEOLAR LAVAGE;  Surgeon: Uri Bass MD;  [facility]: [facility]     BRONCHOSCOPY (RIGID OR FLEXIBLE), DILATE BRONCHUS / TRACHEA N/A 10/11/2018    Procedure: BRONCHOSCOPY (RIGID OR FLEXIBLE), DILATE BRONCHUS / TRACHEA;  Flexible And Rigid Bronchoscopy and Dilation;  Surgeon: Wilber Lin MD;  [facility]     BRONCHOSCOPY FLEXIBLE N/A 3/13/2018    Procedure: BRONCHOSCOPY FLEXIBLE;  Flexible Bronchoscopy ;  Surgeon: Gissell Sanchez MD;  [facility]     BRONCHOSCOPY FLEXIBLE N/A 5/9/2018    Procedure: BRONCHOSCOPY FLEXIBLE;;  Surgeon: Wilber Lin MD;  [facility]     BRONCHOSCOPY FLEXIBLE AND RIGID N/A 9/10/2018    Procedure: BRONCHOSCOPY FLEXIBLE AND RIGID;  Flexible and Rigid Bronchoscopy with Balloon Dilation, tissue debulking with cryo, and Right mainstem bronchus stent placement;  Surgeon: Wilber Lin MD;  [facility]     BRONCHOSCOPY RIGID N/A 6/6/2018    Procedure: BRONCHOSCOPY RIGID;;  Surgeon: Lopez Macias MD;  [facility]     BRONCHOSCOPY, DILATE BRONCHUS, STENT BRONCHUS, COMBINED N/A 6/11/2018    Procedure: COMBINED BRONCHOSCOPY, DILATE BRONCHUS, STENT BRONCHUS;  Flexible Bronchoscopy, Balloon Dilation, Bronchial Washings;  Surgeon: Wilber Lin MD;  [facility]     BRONCHOSCOPY, DILATE BRONCHUS, STENT BRONCHUS, COMBINED Right 7/10/2018    Procedure: COMBINED BRONCHOSCOPY, DILATE BRONCHUS, STENT BRONCHUS;  Flexible Bronchoscopy,  Balloon Dilation, Bronchial Washings  ;  Surgeon: Wilber Lin MD;  [facility]     BRONCHOSCOPY, DILATE BRONCHUS, STENT BRONCHUS, COMBINED N/A 8/2/2018    Procedure: COMBINED BRONCHOSCOPY, DILATE BRONCHUS, STENT BRONCHUS;  Flexible Bronchoscopy, Bronchial Washings, Balloon Dilation;  Surgeon: Wilber Lin MD;  [facility]     BRONCHOSCOPY, DILATE BRONCHUS, STENT BRONCHUS, COMBINED N/A 8/20/2018    Procedure: COMBINED BRONCHOSCOPY, DILATE BRONCHUS, STENT BRONCHUS;  Flexible Bronchoscopy, Balloon Dilation;  Surgeon: Wilber Lin MD;  [facility]     BRONCHOSCOPY, DILATE BRONCHUS, STENT BRONCHUS, COMBINED N/A 10/29/2018    Procedure: Flexible Bronchoscopy, Balloon Dilation, Stent Revision, Airway Examination And Therapeutic Suctioning, Cyro Tumor Debulking;  Surgeon: Wilber Lin MD;  [facility]     BRONCHOSCOPY, DILATE BRONCHUS, STENT BRONCHUS, COMBINED N/A 11/20/2018    Procedure: Rigid Bronchoscopy, Stent Removal and dilitation;  Surgeon: Wilber Lin MD;  [facility]     BRONCHOSCOPY, DILATE BRONCHUS, STENT BRONCHUS, COMBINED N/A 12/14/2018    Procedure: Flexible And Rigid Bronchoscopy, Balloon Dilation, Bronchial Washing;  Surgeon: Wilber Lin MD;  [facility]     BRONCHOSCOPY, DILATE BRONCHUS, STENT BRONCHUS, COMBINED N/A 1/17/2019    Procedure: Flexible And Rigid Bronchoscopy, Balloon Dilation.;  Surgeon: Wilber Lin MD;  [facility]     BRONCHOSCOPY, DILATE BRONCHUS, STENT BRONCHUS, COMBINED N/A 3/7/2019    Procedure: Flexible and Rigid Bronchoscopy, Bronchial Washing, Balloon Dilation;  Surgeon: Wilber Lin MD;  [facility]     BRONCHOSCOPY, DILATE BRONCHUS, STENT BRONCHUS, COMBINED N/A 6/6/2019    Procedure: Rigid and Flexible Bronchoscopy, Balloon Dilation;  Surgeon: Wilber Lin MD;  [facility]     BRONCHOSCOPY, DILATE BRONCHUS, STENT BRONCHUS, COMBINED N/A 10/11/2019     Procedure: Flexible and Rigid Bronchoscopy, Balloon Dilation, Bronchoalveolar Lagave;  Surgeon: Wilber Lin MD;  [facility]     BRONCHOSCOPY, DILATE BRONCHUS, STENT BRONCHUS, COMBINED N/A 2/19/2021    Procedure: BRONCHOSCOPY, flexible, airway dilation, and bronchial wash;  Surgeon: Wilber Lin MD;  [facility]     BRONCHOSCOPY, DILATE BRONCHUS, STENT BRONCHUS, COMBINED N/A 4/9/2021    Procedure: BRONCHOSCOPY, flexible and rigid, Airway suctioning;  Surgeon: Mati Norris MD;  [facility]     CV RIGHT HEART CATH MEASUREMENTS RECORDED N/A 3/10/2020    Procedure: CV RIGHT HEART CATH;  Surgeon: Wai Garcia MD;  [facility]     ENT SURGERY      tonsillectomy as a child     ESOPHAGOSCOPY, GASTROSCOPY, DUODENOSCOPY (EGD), COMBINED N/A 10/29/2018    Procedure: COMBINED ESOPHAGOSCOPY, GASTROSCOPY, DUODENOSCOPY (EGD) with biopsies and polypectomy;  Surgeon: Chente Bloom MD;  [facility]     INSERT EXTRACORPORAL MEMBRANE OXYGENATOR ADULT (OUTSIDE OR) N/A 2/27/2018    Procedure: INSERT EXTRACORPORAL MEMBRANE OXYGENATOR ADULT (OUTSIDE OR);  INSERT EXTRACORPORAL MEMBRANE OXYGENATOR ADULT (OUTSIDE OR) ;  Surgeon: Toby Hernandez MD;  [facility]     IR CVC TUNNEL PLACEMENT > 5 YRS OF AGE  10/25/2019     IR DIALYSIS FISTULOGRAM LEFT  3/2/2021     IR DIALYSIS MECH THROMB, PTA  3/2/2021     IR GASTRO JEJUNOSTOMY TUBE PLACEMENT  2/16/2021     IR PARACENTESIS  1/8/2020     IR THORACENTESIS  9/13/2019     IR TRANSCATHETER BIOPSY  1/8/2020     no prior surgery       REMOVE EXTRACORPORAL MEMBRANE OXYGENATOR ADULT N/A 3/3/2018    Procedure: REMOVE EXTRACORPORAL MEMBRANE OXYGENATOR ADULT;  Removal of Right Femoral Venous and Right Axillary Arterial Extracorporeal Membrane Oxygenator;  Surgeon: Toby Hernandez MD;  [facility]     TRANSPLANT LUNG RECIPIENT SINGLE X2 Bilateral 3/1/2018    Procedure: TRANSPLANT LUNG RECIPIENT  SINGLE X2;  Median Sternotomy, Extracorporeal Membrane Oxygenator, bilateral sequential lung transplant;  Surgeon: Toby Hernandez MD;  [facility]       ALLERGIES:   No Known Allergies    FAMILY HISTORY:  No Premature coronary artery disease  No Atrial fibrillation  No Sudden cardiac death     SOCIAL HISTORY:  Social History     Tobacco Use     Smoking status: Never Smoker     Smokeless tobacco: Never Used   Substance Use Topics     Alcohol use: No     Alcohol/week: 1.0 standard drinks     Types: 1 Glasses of wine per week     Drug use: No       ROS:   Constitutional: No fever, chills, or sweats. Weight stable.   ENT: No visual disturbance, ear ache, epistaxis, sore throat.   Cardiovascular: As per HPI.   Respiratory: No cough, hemoptysis.    GI: No nausea, vomiting, hematemesis, melena, or hematochezia.   : No hematuria.   Integument: Negative.   Psychiatric: Negative.   Hematologic:  Easy bruising, no easy bleeding.  Neuro: Negative.   Endocrinology: No significant heat or cold intolerance   Musculoskeletal: No myalgia.    Exam:  No vitals were done due to remote visit (coronavirus pandemic)    GENERAL APPEARANCE: healthy, alert and no distress  CARDIAC: No visible JVD  RESPIRATORY: breathing comfortably, no use of accessory muscles, no retractions, respirations are unlabored, normal respiratory rate  ABDOMEN: no visible hepatosplenomegaly  EXTREMITIES: no visible no edema  NEURO: alert and oriented to person/place/time, normal speech and affect  SKIN: no visible ecchymoses, no rashes      Labs:  CBC RESULTS:   Lab Results   Component Value Date    WBC 6.3 04/08/2021    RBC 2.98 (L) 04/08/2021    HGB 9.7 (L) 04/08/2021    HCT 29.9 (L) 04/08/2021     04/08/2021    MCH 32.6 04/08/2021    MCHC 32.4 04/08/2021    RDW 15.4 (H) 04/08/2021     04/08/2021       BMP RESULTS:  Lab Results   Component Value Date     04/08/2021    POTASSIUM 3.7 04/08/2021    CHLORIDE 101 04/08/2021    CO2  32 04/08/2021    ANIONGAP 5 04/08/2021     (H) 04/08/2021    BUN 22 04/08/2021    CR 2.49 (H) 04/08/2021    GFRESTIMATED 21 (L) 04/08/2021    GFRESTBLACK 24 (L) 04/08/2021    CHELSEY 7.9 (L) 04/08/2021        INR RESULTS:  Lab Results   Component Value Date    INR 1.04 04/08/2021    INR 0.99 02/19/2021    INR 1.07 02/11/2021    INR 1.02 12/23/2020       Procedures:    Reviewed echocardiogram from 1/25/2021 which demonstrated normal biventricular function (EF of 60 - 65%), no valvular abnormalities.    Reviewed Ziopatch study from 3/18/2021 which demonstrated frequent runs of asymptomatic SVT reflecting 1.6% of all heart beats.    Assessment and Plan:     Patient is a 60 year old female with a PMH most remarkable for ILD (status post bilateral lung transplant in 2018), ESRD (on dialysis TID), history of surgical related DVT (not on anticoagulation anymore), and paroxysmal atrial fibrillation in the setting of acute illness. She presents today for hospital follow up.    1. Paroxysmal Atrial Fibrillation (CHADSVASC of 1; Sex+). Patient has had a few documented cases of atrial fibrillation typically under physiological stress. The first was after she had her lung transplants in 2018, the second when she was admitted to the hospital with ARDS. No structural abnormalities in her echocardiogram. It appears both of these times she was asymptomatic. Based upon her ziopatch study she is not having runs of atrial fibrillation.  - Patient has a CHADSVASC of 1; no indication for anticoagulation  - Continue current metoprolol dose of 25 mg daily    2. Frequent runs of SVT. Patient has frequent runs of asymptomatic SVT, representing 1.6% of heart beats on her most recent ambulatory telemetry study. Discussed with the patient that these are occurring, but they are not something that is particular concerning with a lack of symptoms. For now we will continue to watch.  - Continue metoprolol 25 mg daily     Patient has upcoming  follow up with Dr. Schaffer. Will recommend she continue to follow up with him. She can see us as needed.    Patient was seen and discussed with the attending physician, Dr. Jermaine Mcmahan, who agrees with my assessment and plan.    Jamaal Gill MD PhD  Cardiology Fellow  P: Text Page     EP STAFF NOTE  Patient seen and examined and management plan personally reviewed with the patient. I agree with the note above by the CV/EP fellow.  Jermaine Mcmahan MD [facility]  Cardiology - Electrophysiology

## 2021-04-22 ENCOUNTER — TELEPHONE (OUTPATIENT)
Dept: TRANSPLANT | Facility: CLINIC | Age: 61
End: 2021-04-22

## 2021-04-22 DIAGNOSIS — Z94.2 LUNG REPLACED BY TRANSPLANT (H): ICD-10-CM

## 2021-04-22 NOTE — TELEPHONE ENCOUNTER
CORRECTION TO PREVIOUS INCOMING CRITICAL VALUE     DATE:  4/22/2021   TIME OF RECEIPT FROM LAB:  11:24  LAB TEST:  alkaline phosphatase  LAB VALUE:  401  RESULTS GIVEN WITH READ-BACK TO (PROVIDER):  Mikayla Latham  TIME LAB VALUE REPORTED TO PROVIDER:   11:31

## 2021-04-22 NOTE — TELEPHONE ENCOUNTER
DATE:  4/22/2021   TIME OF RECEIPT FROM LAB:  11:24  LAB TEST:  alkaline phosphatase  LAB VALUE:  7.1  RESULTS GIVEN WITH READ-BACK TO (PROVIDER):  Mikayla Latham  TIME LAB VALUE REPORTED TO PROVIDER:   11:26

## 2021-04-23 ENCOUNTER — TELEPHONE (OUTPATIENT)
Dept: TRANSPLANT | Facility: CLINIC | Age: 61
End: 2021-04-23

## 2021-04-23 NOTE — LETTER
PHYSICIAN ORDERS      DATE & TIME ISSUED: 2021 4:38 PM  PATIENT NAME: Kecia Blue   : 1960     [facility] MR# [if applicable]: [id number]     DIAGNOSIS:  Lung Transplant  Z94.2    Patient needs pre-procedure COVID 19 PCR test done on 21. Date of procedure: 21    Any questions please call: Mikayla     Please fax these results to [phone].        Ame Chow PA-C

## 2021-04-23 NOTE — TELEPHONE ENCOUNTER
Patient called needed COVID test orders faxed to local clinic for procedures next week. Order for test faxed. Patient will contact the clinic if she doesn't hear from them shortly to schedule.

## 2021-04-23 NOTE — LETTER
PHYSICIAN ORDERS      DATE & TIME ISSUED: 2021 4:38 PM  PATIENT NAME: Kecia Blue   : 1960     [facility] MR# [if applicable]: [id number]     DIAGNOSIS:  Lung Transplant  Z94.2    Patient needs pre-procedure COVID 19 PCR test done on 21. Date of procedure: 21    Any questions please call: Mikayla     Please fax these results to [phone].        Ame Chow PA-C

## 2021-04-24 ENCOUNTER — HEALTH MAINTENANCE LETTER (OUTPATIENT)
Age: 61
End: 2021-04-24

## 2021-04-25 LAB
TACROLIMUS BLD-MCNC: 11.7 UG/L (ref 5–15)
TME LAST DOSE: NORMAL H

## 2021-04-26 ENCOUNTER — TELEPHONE (OUTPATIENT)
Dept: INTERVENTIONAL RADIOLOGY/VASCULAR | Facility: CLINIC | Age: 61
End: 2021-04-26

## 2021-04-26 ENCOUNTER — TELEPHONE (OUTPATIENT)
Dept: TRANSPLANT | Facility: CLINIC | Age: 61
End: 2021-04-26

## 2021-04-26 NOTE — RESULT ENCOUNTER NOTE
Armand Mcdonnell,   Your tacrolimus level was 11.7 at 12 hours on 4/20/21, goal 8-10. Since your level was too low on the lower dose, no dose change for now and we'll also recheck this later this week as well. Thanks!    Mikayla

## 2021-04-26 NOTE — TELEPHONE ENCOUNTER
Clarified with lab that COVID test needs to be PCR. This has a 2-3 day turnout, but results will be back in time for procedure on 4/29.

## 2021-04-28 ENCOUNTER — TELEPHONE (OUTPATIENT)
Dept: TRANSPLANT | Facility: CLINIC | Age: 61
End: 2021-04-28

## 2021-04-28 NOTE — TELEPHONE ENCOUNTER
Pt is coming to the hospital for chest tube placement and or bronchoscopy.  Patient reports that she will need dialysis on Friday, April 30th.  This can be done inpatient.

## 2021-04-29 ENCOUNTER — ANESTHESIA EVENT (OUTPATIENT)
Dept: SURGERY | Facility: CLINIC | Age: 61
End: 2021-04-29
Payer: COMMERCIAL

## 2021-04-29 ENCOUNTER — APPOINTMENT (OUTPATIENT)
Dept: MEDSURG UNIT | Facility: CLINIC | Age: 61
End: 2021-04-29
Attending: THORACIC SURGERY (CARDIOTHORACIC VASCULAR SURGERY)
Payer: COMMERCIAL

## 2021-04-29 ENCOUNTER — HOSPITAL ENCOUNTER (INPATIENT)
Facility: CLINIC | Age: 61
LOS: 8 days | Discharge: HOME | End: 2021-05-07
Attending: THORACIC SURGERY (CARDIOTHORACIC VASCULAR SURGERY) | Admitting: STUDENT IN AN ORGANIZED HEALTH CARE EDUCATION/TRAINING PROGRAM
Payer: COMMERCIAL

## 2021-04-29 ENCOUNTER — APPOINTMENT (OUTPATIENT)
Dept: INTERVENTIONAL RADIOLOGY/VASCULAR | Facility: CLINIC | Age: 61
End: 2021-04-29
Attending: NURSE PRACTITIONER
Payer: COMMERCIAL

## 2021-04-29 DIAGNOSIS — J98.09 BRONCHIAL STENOSIS, RIGHT: ICD-10-CM

## 2021-04-29 DIAGNOSIS — Z94.2 S/P LUNG TRANSPLANT (H): ICD-10-CM

## 2021-04-29 DIAGNOSIS — J90 LOCULATED PLEURAL EFFUSION: ICD-10-CM

## 2021-04-29 LAB
ERYTHROCYTE [DISTWIDTH] IN BLOOD BY AUTOMATED COUNT: 13.9 % (ref 10–15)
GRAM STN SPEC: NORMAL
GRAM STN SPEC: NORMAL
HCT VFR BLD AUTO: 34.5 % (ref 35–47)
HGB BLD-MCNC: 11.1 G/DL (ref 11.7–15.7)
INR PPP: 0.99 (ref 0.86–1.14)
MAGNESIUM SERPL-MCNC: 1.6 MG/DL (ref 1.6–2.3)
MCH RBC QN AUTO: 32.2 PG (ref 26.5–33)
MCHC RBC AUTO-ENTMCNC: 32.2 G/DL (ref 31.5–36.5)
MCV RBC AUTO: 100 FL (ref 78–100)
PLATELET # BLD AUTO: 218 10E9/L (ref 150–450)
RBC # BLD AUTO: 3.45 10E12/L (ref 3.8–5.2)
SPECIMEN SOURCE: NORMAL
WBC # BLD AUTO: 6.3 10E9/L (ref 4–11)

## 2021-04-29 PROCEDURE — 99152 MOD SED SAME PHYS/QHP 5/>YRS: CPT | Mod: GC | Performed by: RADIOLOGY

## 2021-04-29 PROCEDURE — 85027 COMPLETE CBC AUTOMATED: CPT | Performed by: NURSE PRACTITIONER

## 2021-04-29 PROCEDURE — 99223 1ST HOSP IP/OBS HIGH 75: CPT | Mod: AI | Performed by: STUDENT IN AN ORGANIZED HEALTH CARE EDUCATION/TRAINING PROGRAM

## 2021-04-29 PROCEDURE — 214N000001 HC R&B CCU UMMC

## 2021-04-29 PROCEDURE — C1769 GUIDE WIRE: HCPCS

## 2021-04-29 PROCEDURE — 87070 CULTURE OTHR SPECIMN AEROBIC: CPT | Performed by: NURSE PRACTITIONER

## 2021-04-29 PROCEDURE — 83735 ASSAY OF MAGNESIUM: CPT | Performed by: NURSE PRACTITIONER

## 2021-04-29 PROCEDURE — 99152 MOD SED SAME PHYS/QHP 5/>YRS: CPT

## 2021-04-29 PROCEDURE — 94640 AIRWAY INHALATION TREATMENT: CPT

## 2021-04-29 PROCEDURE — 272N000192 HC ACCESSORY CR2

## 2021-04-29 PROCEDURE — 85610 PROTHROMBIN TIME: CPT | Performed by: NURSE PRACTITIONER

## 2021-04-29 PROCEDURE — 250N000009 HC RX 250: Performed by: NURSE PRACTITIONER

## 2021-04-29 PROCEDURE — 258N000003 HC RX IP 258 OP 636: Performed by: ANESTHESIOLOGY

## 2021-04-29 PROCEDURE — 258N000003 HC RX IP 258 OP 636: Performed by: NURSE PRACTITIONER

## 2021-04-29 PROCEDURE — 250N000011 HC RX IP 250 OP 636: Performed by: STUDENT IN AN ORGANIZED HEALTH CARE EDUCATION/TRAINING PROGRAM

## 2021-04-29 PROCEDURE — 250N000009 HC RX 250: Performed by: STUDENT IN AN ORGANIZED HEALTH CARE EDUCATION/TRAINING PROGRAM

## 2021-04-29 PROCEDURE — 250N000013 HC RX MED GY IP 250 OP 250 PS 637: Performed by: NURSE PRACTITIONER

## 2021-04-29 PROCEDURE — 999N000157 HC STATISTIC RCP TIME EA 10 MIN

## 2021-04-29 PROCEDURE — 32557 INSERT CATH PLEURA W/ IMAGE: CPT | Mod: LT | Performed by: RADIOLOGY

## 2021-04-29 PROCEDURE — 999N000142 HC STATISTIC PROCEDURE PREP ONLY

## 2021-04-29 PROCEDURE — 250N000012 HC RX MED GY IP 250 OP 636 PS 637: Performed by: NURSE PRACTITIONER

## 2021-04-29 PROCEDURE — 36415 COLL VENOUS BLD VENIPUNCTURE: CPT | Performed by: NURSE PRACTITIONER

## 2021-04-29 PROCEDURE — 272N000500 HC NEEDLE CR2

## 2021-04-29 PROCEDURE — 0W9B3ZZ DRAINAGE OF LEFT PLEURAL CAVITY, PERCUTANEOUS APPROACH: ICD-10-PCS | Performed by: PHYSICIAN ASSISTANT

## 2021-04-29 PROCEDURE — 99207 PR APP CREDIT; MD BILLING SHARED VISIT: CPT | Performed by: NURSE PRACTITIONER

## 2021-04-29 PROCEDURE — C1729 CATH, DRAINAGE: HCPCS

## 2021-04-29 PROCEDURE — 87075 CULTR BACTERIA EXCEPT BLOOD: CPT | Performed by: NURSE PRACTITIONER

## 2021-04-29 PROCEDURE — 32557 INSERT CATH PLEURA W/ IMAGE: CPT

## 2021-04-29 PROCEDURE — 87205 SMEAR GRAM STAIN: CPT | Performed by: NURSE PRACTITIONER

## 2021-04-29 PROCEDURE — 272N000196 HC ACCESSORY CR5

## 2021-04-29 RX ORDER — LIDOCAINE 40 MG/G
CREAM TOPICAL
Status: DISCONTINUED | OUTPATIENT
Start: 2021-04-29 | End: 2021-05-07 | Stop reason: HOSPADM

## 2021-04-29 RX ORDER — FLUTICASONE PROPIONATE 50 MCG
1 SPRAY, SUSPENSION (ML) NASAL DAILY
Status: DISCONTINUED | OUTPATIENT
Start: 2021-04-30 | End: 2021-05-07 | Stop reason: HOSPADM

## 2021-04-29 RX ORDER — NALOXONE HYDROCHLORIDE 0.4 MG/ML
0.4 INJECTION, SOLUTION INTRAMUSCULAR; INTRAVENOUS; SUBCUTANEOUS
Status: DISCONTINUED | OUTPATIENT
Start: 2021-04-29 | End: 2021-04-29 | Stop reason: HOSPADM

## 2021-04-29 RX ORDER — BUDESONIDE 0.5 MG/2ML
0.5 INHALANT ORAL 2 TIMES DAILY
Status: DISCONTINUED | OUTPATIENT
Start: 2021-04-29 | End: 2021-05-07 | Stop reason: HOSPADM

## 2021-04-29 RX ORDER — PANTOPRAZOLE SODIUM 40 MG/1
40 TABLET, DELAYED RELEASE ORAL
Status: DISCONTINUED | OUTPATIENT
Start: 2021-04-30 | End: 2021-05-07 | Stop reason: HOSPADM

## 2021-04-29 RX ORDER — PREDNISONE 5 MG/1
5 TABLET ORAL EVERY MORNING
Status: DISCONTINUED | OUTPATIENT
Start: 2021-04-30 | End: 2021-05-07 | Stop reason: HOSPADM

## 2021-04-29 RX ORDER — SODIUM CHLORIDE, SODIUM LACTATE, POTASSIUM CHLORIDE, CALCIUM CHLORIDE 600; 310; 30; 20 MG/100ML; MG/100ML; MG/100ML; MG/100ML
INJECTION, SOLUTION INTRAVENOUS CONTINUOUS
Status: DISCONTINUED | OUTPATIENT
Start: 2021-04-29 | End: 2021-04-29

## 2021-04-29 RX ORDER — NALOXONE HYDROCHLORIDE 0.4 MG/ML
0.2 INJECTION, SOLUTION INTRAMUSCULAR; INTRAVENOUS; SUBCUTANEOUS
Status: DISCONTINUED | OUTPATIENT
Start: 2021-04-29 | End: 2021-04-29 | Stop reason: HOSPADM

## 2021-04-29 RX ORDER — MEPERIDINE HYDROCHLORIDE 25 MG/ML
12.5 INJECTION INTRAMUSCULAR; INTRAVENOUS; SUBCUTANEOUS EVERY 5 MIN PRN
Status: DISCONTINUED | OUTPATIENT
Start: 2021-04-29 | End: 2021-04-29

## 2021-04-29 RX ORDER — ONDANSETRON 4 MG/1
4 TABLET, ORALLY DISINTEGRATING ORAL EVERY 6 HOURS PRN
Status: DISCONTINUED | OUTPATIENT
Start: 2021-04-29 | End: 2021-05-07 | Stop reason: HOSPADM

## 2021-04-29 RX ORDER — PREDNISONE 2.5 MG/1
2.5 TABLET ORAL EVERY EVENING
Status: DISCONTINUED | OUTPATIENT
Start: 2021-04-29 | End: 2021-05-07 | Stop reason: HOSPADM

## 2021-04-29 RX ORDER — LIDOCAINE 40 MG/G
CREAM TOPICAL
Status: DISCONTINUED | OUTPATIENT
Start: 2021-04-29 | End: 2021-04-29 | Stop reason: HOSPADM

## 2021-04-29 RX ORDER — ONDANSETRON 4 MG/1
4 TABLET, ORALLY DISINTEGRATING ORAL EVERY 30 MIN PRN
Status: DISCONTINUED | OUTPATIENT
Start: 2021-04-29 | End: 2021-04-29

## 2021-04-29 RX ORDER — VIT B COMP NO.3/FOLIC/C/BIOTIN 1 MG-60 MG
1 TABLET ORAL
Status: DISCONTINUED | OUTPATIENT
Start: 2021-04-29 | End: 2021-05-07 | Stop reason: HOSPADM

## 2021-04-29 RX ORDER — HYDROMORPHONE HYDROCHLORIDE 1 MG/ML
.3-.5 INJECTION, SOLUTION INTRAMUSCULAR; INTRAVENOUS; SUBCUTANEOUS EVERY 5 MIN PRN
Status: DISCONTINUED | OUTPATIENT
Start: 2021-04-29 | End: 2021-04-29

## 2021-04-29 RX ORDER — ONDANSETRON 2 MG/ML
4 INJECTION INTRAMUSCULAR; INTRAVENOUS EVERY 6 HOURS PRN
Status: DISCONTINUED | OUTPATIENT
Start: 2021-04-29 | End: 2021-04-29 | Stop reason: HOSPADM

## 2021-04-29 RX ORDER — SULFAMETHOXAZOLE AND TRIMETHOPRIM 400; 80 MG/1; MG/1
1 TABLET ORAL
Status: DISCONTINUED | OUTPATIENT
Start: 2021-04-30 | End: 2021-05-07 | Stop reason: HOSPADM

## 2021-04-29 RX ORDER — FENTANYL CITRATE 50 UG/ML
25-50 INJECTION, SOLUTION INTRAMUSCULAR; INTRAVENOUS EVERY 5 MIN PRN
Status: DISCONTINUED | OUTPATIENT
Start: 2021-04-29 | End: 2021-04-29 | Stop reason: HOSPADM

## 2021-04-29 RX ORDER — METOPROLOL TARTRATE 25 MG/1
25 TABLET, FILM COATED ORAL 2 TIMES DAILY
Status: DISCONTINUED | OUTPATIENT
Start: 2021-04-29 | End: 2021-05-07 | Stop reason: HOSPADM

## 2021-04-29 RX ORDER — METOPROLOL TARTRATE 1 MG/ML
1-2 INJECTION, SOLUTION INTRAVENOUS EVERY 5 MIN PRN
Status: DISCONTINUED | OUTPATIENT
Start: 2021-04-29 | End: 2021-04-29

## 2021-04-29 RX ORDER — ONDANSETRON 2 MG/ML
4 INJECTION INTRAMUSCULAR; INTRAVENOUS EVERY 30 MIN PRN
Status: DISCONTINUED | OUTPATIENT
Start: 2021-04-29 | End: 2021-04-29

## 2021-04-29 RX ORDER — ACETAMINOPHEN 325 MG/1
650 TABLET ORAL EVERY 4 HOURS PRN
Status: DISCONTINUED | OUTPATIENT
Start: 2021-04-29 | End: 2021-05-07 | Stop reason: HOSPADM

## 2021-04-29 RX ORDER — TACROLIMUS 1 MG/1
1 CAPSULE ORAL EVERY EVENING
Status: DISCONTINUED | OUTPATIENT
Start: 2021-04-29 | End: 2021-04-30

## 2021-04-29 RX ORDER — FENTANYL CITRATE 50 UG/ML
25-50 INJECTION, SOLUTION INTRAMUSCULAR; INTRAVENOUS
Status: DISCONTINUED | OUTPATIENT
Start: 2021-04-29 | End: 2021-04-29

## 2021-04-29 RX ORDER — SODIUM CHLORIDE 9 MG/ML
INJECTION, SOLUTION INTRAVENOUS CONTINUOUS
Status: DISCONTINUED | OUTPATIENT
Start: 2021-04-29 | End: 2021-04-29 | Stop reason: HOSPADM

## 2021-04-29 RX ORDER — FLUMAZENIL 0.1 MG/ML
0.2 INJECTION, SOLUTION INTRAVENOUS
Status: DISCONTINUED | OUTPATIENT
Start: 2021-04-29 | End: 2021-04-29 | Stop reason: HOSPADM

## 2021-04-29 RX ORDER — ALBUTEROL SULFATE 0.83 MG/ML
2.5 SOLUTION RESPIRATORY (INHALATION) 2 TIMES DAILY
Status: DISCONTINUED | OUTPATIENT
Start: 2021-04-29 | End: 2021-05-07 | Stop reason: HOSPADM

## 2021-04-29 RX ORDER — POSACONAZOLE 100 MG/1
300 TABLET, DELAYED RELEASE ORAL DAILY
Status: DISCONTINUED | OUTPATIENT
Start: 2021-04-30 | End: 2021-05-07 | Stop reason: HOSPADM

## 2021-04-29 RX ORDER — ONDANSETRON 2 MG/ML
4 INJECTION INTRAMUSCULAR; INTRAVENOUS EVERY 6 HOURS PRN
Status: DISCONTINUED | OUTPATIENT
Start: 2021-04-29 | End: 2021-05-07 | Stop reason: HOSPADM

## 2021-04-29 RX ORDER — SODIUM CHLORIDE, SODIUM LACTATE, POTASSIUM CHLORIDE, CALCIUM CHLORIDE 600; 310; 30; 20 MG/100ML; MG/100ML; MG/100ML; MG/100ML
INJECTION, SOLUTION INTRAVENOUS
Status: DISCONTINUED
Start: 2021-04-29 | End: 2021-04-30 | Stop reason: HOSPADM

## 2021-04-29 RX ORDER — TACROLIMUS 0.5 MG/1
0.5 CAPSULE ORAL EVERY MORNING
Status: DISCONTINUED | OUTPATIENT
Start: 2021-04-30 | End: 2021-04-30

## 2021-04-29 RX ORDER — POLYETHYLENE GLYCOL 3350 17 G/17G
17 POWDER, FOR SOLUTION ORAL DAILY PRN
Status: DISCONTINUED | OUTPATIENT
Start: 2021-04-29 | End: 2021-05-07 | Stop reason: HOSPADM

## 2021-04-29 RX ORDER — HYDRALAZINE HYDROCHLORIDE 20 MG/ML
2.5-5 INJECTION INTRAMUSCULAR; INTRAVENOUS EVERY 10 MIN PRN
Status: DISCONTINUED | OUTPATIENT
Start: 2021-04-29 | End: 2021-04-29

## 2021-04-29 RX ADMIN — SODIUM CHLORIDE: 9 INJECTION, SOLUTION INTRAVENOUS at 10:33

## 2021-04-29 RX ADMIN — B-COMPLEX W/ C & FOLIC ACID TAB 1 MG 1 TABLET: 1 TAB at 19:24

## 2021-04-29 RX ADMIN — METOPROLOL TARTRATE 25 MG: 25 TABLET, FILM COATED ORAL at 19:24

## 2021-04-29 RX ADMIN — FENTANYL CITRATE 25 MCG: 50 INJECTION, SOLUTION INTRAMUSCULAR; INTRAVENOUS at 13:22

## 2021-04-29 RX ADMIN — FENTANYL CITRATE 50 MCG: 50 INJECTION, SOLUTION INTRAMUSCULAR; INTRAVENOUS at 13:13

## 2021-04-29 RX ADMIN — PREDNISONE 2.5 MG: 2.5 TABLET ORAL at 19:25

## 2021-04-29 RX ADMIN — ALBUTEROL SULFATE 2.5 MG: 2.5 SOLUTION RESPIRATORY (INHALATION) at 20:03

## 2021-04-29 RX ADMIN — LIDOCAINE HYDROCHLORIDE 4 ML: 10 INJECTION, SOLUTION EPIDURAL; INFILTRATION; INTRACAUDAL; PERINEURAL at 13:23

## 2021-04-29 RX ADMIN — MIDAZOLAM 1 MG: 1 INJECTION INTRAMUSCULAR; INTRAVENOUS at 13:14

## 2021-04-29 RX ADMIN — SODIUM CHLORIDE, POTASSIUM CHLORIDE, SODIUM LACTATE AND CALCIUM CHLORIDE: 600; 310; 30; 20 INJECTION, SOLUTION INTRAVENOUS at 16:26

## 2021-04-29 RX ADMIN — FENTANYL CITRATE 25 MCG: 50 INJECTION, SOLUTION INTRAMUSCULAR; INTRAVENOUS at 13:18

## 2021-04-29 RX ADMIN — MIDAZOLAM 0.5 MG: 1 INJECTION INTRAMUSCULAR; INTRAVENOUS at 13:22

## 2021-04-29 RX ADMIN — ACETAMINOPHEN 650 MG: 325 TABLET, FILM COATED ORAL at 16:25

## 2021-04-29 RX ADMIN — BUDESONIDE 0.5 MG: 0.5 INHALANT RESPIRATORY (INHALATION) at 20:03

## 2021-04-29 RX ADMIN — TACROLIMUS 1 MG: 1 CAPSULE ORAL at 19:25

## 2021-04-29 RX ADMIN — MIDAZOLAM 0.5 MG: 1 INJECTION INTRAMUSCULAR; INTRAVENOUS at 13:19

## 2021-04-29 ASSESSMENT — ACTIVITIES OF DAILY LIVING (ADL)
ADLS_ACUITY_SCORE: 15
ADLS_ACUITY_SCORE: 15

## 2021-04-29 ASSESSMENT — ENCOUNTER SYMPTOMS: DYSRHYTHMIAS: 1

## 2021-04-29 ASSESSMENT — MIFFLIN-ST. JEOR: SCORE: 1070.77

## 2021-04-29 NOTE — PRE-PROCEDURE
GENERAL PRE-PROCEDURE:   Procedure:  Image-Guided Left Chest Tube Placement  Date/Time:  4/29/2021 10:20 AM    Verbal consent obtained?: Yes    Written consent obtained?: Yes    Risks and benefits: Risks, benefits and alternatives were discussed    Consent given by:  Patient  Patient states understanding of procedure being performed: Yes    Patient's understanding of procedure matches consent: Yes    Procedure consent matches procedure scheduled: Yes    Expected level of sedation:  Moderate  Appropriately NPO:  Yes  ASA Class:  Class 2- mild systemic disease, no acute problems, no functional limitations  Mallampati  :  Grade 2- soft palate, base of uvula, tonsillar pillars, and portion of posterior pharyngeal wall visible  Lungs:  Lungs clear with good breath sounds bilaterally  Heart:  Normal heart sounds and rate  History & Physical reviewed:  History and physical reviewed and no updates needed  Statement of review:  I have reviewed the lab findings, diagnostic data, medications, and the plan for sedation

## 2021-04-29 NOTE — PROGRESS NOTES
Prepped for placement of chest tube.   Plan is for admission post-procedure.  Denies pain.   in room with her.  H & P current.  Labs pending.  Consent signed.

## 2021-04-29 NOTE — PROCEDURES
[facility]    Procedure: Left chest tube placement    Date/Time: 4/29/2021 1:30 PM  Performed by: Meet Rich MD  Authorized by: Meet Rich MD   IR Fellow Physician: Meet Rich  Radiology Resident Physician: Chris Wilder      UNIVERSAL PROTOCOL   Site Marked: NA  Prior Images Obtained and Reviewed:  Yes  Required items: Required blood products, implants, devices and special equipment available    Patient identity confirmed:  Verbally with patient, arm band, provided demographic data and hospital-assigned identification number  Patient was reevaluated immediately before administering moderate or deep sedation or anesthesia  Confirmation Checklist:  Patient's identity using two indicators, relevant allergies, procedure was appropriate and matched the consent or emergent situation and correct equipment/implants were available  Time out: Immediately prior to the procedure a time out was called    Universal Protocol: the Joint Commission Universal Protocol was followed    Preparation: Patient was prepped and draped in usual sterile fashion    ESBL (mL):  2         ANESTHESIA    Anesthesia: Local infiltration  Local Anesthetic:  Lidocaine 1% without epinephrine  Anesthetic Total (mL):  10      SEDATION    Patient Sedated: Yes    Sedation Type:  Moderate (conscious) sedation  Vital signs: Vital signs monitored during sedation    See dictated procedure note for full details.  Findings: 14F Left chest tube placed.   30 ml thick pus aspirated.    Specimens: none    Complications: None    Condition: Stable    Plan: - Chest tube to water seal and low wall suction    PROCEDURE   Patient Tolerance:  Patient tolerated the procedure well with no immediate complications    Length of time physician/provider present for 1:1 monitoring during sedation: 20

## 2021-04-29 NOTE — PROGRESS NOTES
Patient Name: Kecia Blue  Medical Record Number: [id number]  Today's Date: 4/29/2021    Procedure: image guided left chest tube placement  Proceduralist: Linda GREENE, Tina GREENE, Hilario GREENE    Procedure Start: 1311  Procedure end: 1330  Sedation medications administered: 2mg versed IV, 100 mcg fentanyl IV    : n/a    Other Notes: Pt arrived to IR room CT 2 from 2A. Consent reviewed. Pt denies any questions or concerns regarding procedure. Pt positioned left lateral up and monitored per protocol. Pt tolerated procedure without any noted complications. Pt to recovery in IR until 6C bed available.

## 2021-04-29 NOTE — H&P
[facility]    History and Physical - Hospitalist Service, Gold Night        Date of Admission:  4/29/2021    Assessment & Plan   Kecia Blue is a 60 year old female with past medical history significant for ILD s/p bilateral lung transplant (3/1/2018) 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  Pleural fluid cx obtained.  Plan is for bronchoscopy on 4/30 w/ Dr. Norris for possible dilation and stent/tissue debulking.  D/w Pulm this afternoon, will continue current medication regimen.  No indication for additional antibiotics at this time. Stable on room air.    - Transplant Pulmonary following, appreciate recommendations   - Regular diet for now, NPO at midnight   - IS: continue Prednisone 5mg/2.5mg, Tacro (goal 8-10)   - Continue PTA Posaconazole 300mg daily   - Continue PTA Bactrim on MWF   - Continue Albuterol nebs BID  - Continue Pulmicort nebs BID   - Continue Flonase daily   - Monitor for new fevers   - Check tacro trough level in AM   - Follow up CT chest in 7/2021 per Transplant ID    # ESRD on HD MWF - Follows w/ [facility] under Dr. Glasgow via AVF/G.  On HD since 11/2019.  Non-oliguiric, mostly urinates in the morning hours.    - Nephrology consult to continue HD   - BMP, CBC on MWF   - Monitor I/Os, daily weights     # Hx PAF, SVT - Per chart review,  "hx of AF w/ RVR during periods of acute illness and acute respiratory failure.  Follows w/ [facility] EP, last seen by Dr. Mcmahan on 4/21.  Outpt Ziopatch study neg for AF, though did have frequent runs of SVT but was asymptomatic.  Currently on Metoprolol 25mg BID for rate control.  [id number].  Per Cardiology, no indication for anticoagulation at this time.   - Telemetry for now   - Continue PTA Metoprolol w/ hold parameters   - Follow up with [facility] EP as needed    # Hypomagnesemia - Mag 1.4 on last check.  Currently on Slow-Mag 71.5-119mg on non-HD days.   - Check Mag level today, results pending   - Continue PTA Slow-mag supplementation   - Ensure stable lytes given hx SVT, PAF     # Anemia - Likely 2/2 ESRD.  Hgb 11.1 on admission.   - CBC in AM       Diet: NPO per Anesthesia Guidelines for Procedure/Surgery Except for: No Exceptions  Regular Diet Adult    DVT Prophylaxis: Pneumatic Compression Devices  Basilio Catheter: not present  Code Status:   FULL          Disposition Plan   Expected discharge: 1-2 days , recommended to prior living arrangement pending clinical stability, O2 requirements at baseline, and completion of bronchoscopy.  Entered: ADRIÁN Ray CNP 04/29/2021, 3:59 PM     The patient's care was discussed with the Attending Physician, Dr. Starr Perales.    ADRIÁN Ray CNP  [facility]  Contact information available via [facility] Paging/Directory  Please see sign in/sign out for up to date coverage information    ______________________________________________________________________    Chief Complaint   \"I'm doing okay\"     History is obtained from the patient and chart review.      History of Present Illness   Kecia Blue is a 60 year old female with past medical history significant for ILD s/p bilateral lung transplant (3/1/2018) c/b R mainstem bronchial stenosis requiring dilations, Aspergillus empyema, and CMV viremia, HTN, hx " postop DVT not currently on anticoagulation, hx PAF, SVT, ESRD on HD MWF, and anemia admitted for scheduled left sided chest tube placement w/ IR due to persistent loculated pleural effusion.      Kecia is resting in bed.  Her  is at the bedside.  She feels okay after having the chest tube placement, only complaint is mild discomfort at tube insertion site.  Denies chest pain, nausea, vomiting, heart palpitations, and dyspnea post procedure.  She endorses mild productive cough with clear mucous most mornings, says this is her baseline.  Denies fevers and chills.  Good appetite lately.      Review of Systems    The 10 point Review of Systems is negative other than noted in the HPI or here.     Past Medical History    I have reviewed this patient's medical history and updated it with pertinent information if needed.   Past Medical History:   Diagnosis Date     Acute on chronic respiratory failure with hypoxia (H) 02/21/2018     Anisocoria      Antisynthetase syndrome (H) 2014     Anxiety      Aspergillus (H)      Aspergillus pneumonia (H) 11/20/2020     Benign essential hypertension      C. difficile colitis      Cytomegalovirus (CMV) viremia (H)      Dermatomyositis (H)      Dysplasia of cervix, low grade (ESTRADA 1)      EBV (Franklin-Barr virus) viremia      ESRD (end stage renal disease) on dialysis (H)      ILD (interstitial lung disease) (H)      Lung replaced by transplant (H)      Osteopenia      Paroxysmal atrial fibrillation (H)      Pneumocystis jiroveci pneumonia (H)      PONV (postoperative nausea and vomiting)      Post-menopause      Pulmonary hypertension (H)      Raynaud's disease      Seronegative rheumatoid arthritis (H)        Past Surgical History   I have reviewed this patient's surgical history and updated it with pertinent information if needed.  Past Surgical History:   Procedure Laterality Date     BRONCHOSCOPY (RIGID OR FLEXIBLE), DIAGNOSTIC N/A 4/10/2018    Procedure: COMBINED  BRONCHOSCOPY (RIGID OR FLEXIBLE), LAVAGE;;  Surgeon: Mariposa Donohue MD;  Location:  GI     BRONCHOSCOPY (RIGID OR FLEXIBLE), DIAGNOSTIC N/A 12/23/2020    Procedure: BRONCHOSCOPY, WITH BRONCHOALVEOLAR LAVAGE;  Surgeon: Uri Bass MD;  [facility]: [facility]     BRONCHOSCOPY (RIGID OR FLEXIBLE), DILATE BRONCHUS / TRACHEA N/A 10/11/2018    Procedure: BRONCHOSCOPY (RIGID OR FLEXIBLE), DILATE BRONCHUS / TRACHEA;  Flexible And Rigid Bronchoscopy and Dilation;  Surgeon: Wilber Lin MD;  [facility]     BRONCHOSCOPY FLEXIBLE N/A 3/13/2018    Procedure: BRONCHOSCOPY FLEXIBLE;  Flexible Bronchoscopy ;  Surgeon: Gissell Sanchez MD;  [facility]     BRONCHOSCOPY FLEXIBLE N/A 5/9/2018    Procedure: BRONCHOSCOPY FLEXIBLE;;  Surgeon: Wilber Lin MD;  [facility]     BRONCHOSCOPY FLEXIBLE AND RIGID N/A 9/10/2018    Procedure: BRONCHOSCOPY FLEXIBLE AND RIGID;  Flexible and Rigid Bronchoscopy with Balloon Dilation, tissue debulking with cryo, and Right mainstem bronchus stent placement;  Surgeon: Wilber Lin MD;  [facility]     BRONCHOSCOPY RIGID N/A 6/6/2018    Procedure: BRONCHOSCOPY RIGID;;  Surgeon: Lopez Macias MD;  [facility]     BRONCHOSCOPY, DILATE BRONCHUS, STENT BRONCHUS, COMBINED N/A 6/11/2018    Procedure: COMBINED BRONCHOSCOPY, DILATE BRONCHUS, STENT BRONCHUS;  Flexible Bronchoscopy, Balloon Dilation, Bronchial Washings;  Surgeon: Wilber Lin MD;  [facility]     BRONCHOSCOPY, DILATE BRONCHUS, STENT BRONCHUS, COMBINED Right 7/10/2018    Procedure: COMBINED BRONCHOSCOPY, DILATE BRONCHUS, STENT BRONCHUS;  Flexible Bronchoscopy, Balloon Dilation, Bronchial Washings  ;  Surgeon: Wilber Lin MD;  [facility]     BRONCHOSCOPY, DILATE BRONCHUS, STENT BRONCHUS, COMBINED N/A 8/2/2018    Procedure: COMBINED BRONCHOSCOPY, DILATE BRONCHUS, STENT BRONCHUS;  Flexible Bronchoscopy, Bronchial Washings, Balloon Dilation;   Surgeon: Wilber Lin MD;  [facility]     BRONCHOSCOPY, DILATE BRONCHUS, STENT BRONCHUS, COMBINED N/A 8/20/2018    Procedure: COMBINED BRONCHOSCOPY, DILATE BRONCHUS, STENT BRONCHUS;  Flexible Bronchoscopy, Balloon Dilation;  Surgeon: Wilber Lin MD;  [facility]     BRONCHOSCOPY, DILATE BRONCHUS, STENT BRONCHUS, COMBINED N/A 10/29/2018    Procedure: Flexible Bronchoscopy, Balloon Dilation, Stent Revision, Airway Examination And Therapeutic Suctioning, Cyro Tumor Debulking;  Surgeon: Wilber Lin MD;  [facility]     BRONCHOSCOPY, DILATE BRONCHUS, STENT BRONCHUS, COMBINED N/A 11/20/2018    Procedure: Rigid Bronchoscopy, Stent Removal and dilitation;  Surgeon: Wilber Lin MD;  [facility]     BRONCHOSCOPY, DILATE BRONCHUS, STENT BRONCHUS, COMBINED N/A 12/14/2018    Procedure: Flexible And Rigid Bronchoscopy, Balloon Dilation, Bronchial Washing;  Surgeon: Wilber Lin MD;  [facility]     BRONCHOSCOPY, DILATE BRONCHUS, STENT BRONCHUS, COMBINED N/A 1/17/2019    Procedure: Flexible And Rigid Bronchoscopy, Balloon Dilation.;  Surgeon: Wilber Lin MD;  [facility]     BRONCHOSCOPY, DILATE BRONCHUS, STENT BRONCHUS, COMBINED N/A 3/7/2019    Procedure: Flexible and Rigid Bronchoscopy, Bronchial Washing, Balloon Dilation;  Surgeon: Wilber Lin MD;  [facility]     BRONCHOSCOPY, DILATE BRONCHUS, STENT BRONCHUS, COMBINED N/A 6/6/2019    Procedure: Rigid and Flexible Bronchoscopy, Balloon Dilation;  Surgeon: Wilber Lin MD;  [facility]     BRONCHOSCOPY, DILATE BRONCHUS, STENT BRONCHUS, COMBINED N/A 10/11/2019    Procedure: Flexible and Rigid Bronchoscopy, Balloon Dilation, Bronchoalveolar Lagave;  Surgeon: Wilber Lin MD;  [facility]     BRONCHOSCOPY, DILATE BRONCHUS, STENT BRONCHUS, COMBINED N/A 2/19/2021    Procedure: BRONCHOSCOPY, flexible, airway dilation, and bronchial wash;  Surgeon: Delia  Wilber Warner MD;  [facility]     BRONCHOSCOPY, DILATE BRONCHUS, STENT BRONCHUS, COMBINED N/A 4/9/2021    Procedure: BRONCHOSCOPY, flexible and rigid, Airway suctioning;  Surgeon: Mati Norris MD;  [facility]     CV RIGHT HEART CATH MEASUREMENTS RECORDED N/A 3/10/2020    Procedure: CV RIGHT HEART CATH;  Surgeon: Wai Garcia MD;  [facility]     ENT SURGERY      tonsillectomy as a child     ESOPHAGOSCOPY, GASTROSCOPY, DUODENOSCOPY (EGD), COMBINED N/A 10/29/2018    Procedure: COMBINED ESOPHAGOSCOPY, GASTROSCOPY, DUODENOSCOPY (EGD) with biopsies and polypectomy;  Surgeon: Chente Bloom MD;  [facility]     INSERT EXTRACORPORAL MEMBRANE OXYGENATOR ADULT (OUTSIDE OR) N/A 2/27/2018    Procedure: INSERT EXTRACORPORAL MEMBRANE OXYGENATOR ADULT (OUTSIDE OR);  INSERT EXTRACORPORAL MEMBRANE OXYGENATOR ADULT (OUTSIDE OR) ;  Surgeon: Toby Hernandez MD;  [facility]     IR CVC TUNNEL PLACEMENT > 5 YRS OF AGE  10/25/2019     IR DIALYSIS FISTULOGRAM LEFT  3/2/2021     IR DIALYSIS MECH THROMB, PTA  3/2/2021     IR GASTRO JEJUNOSTOMY TUBE PLACEMENT  2/16/2021     IR PARACENTESIS  1/8/2020     IR THORACENTESIS  9/13/2019     IR TRANSCATHETER BIOPSY  1/8/2020     no prior surgery       REMOVE EXTRACORPORAL MEMBRANE OXYGENATOR ADULT N/A 3/3/2018    Procedure: REMOVE EXTRACORPORAL MEMBRANE OXYGENATOR ADULT;  Removal of Right Femoral Venous and Right Axillary Arterial Extracorporeal Membrane Oxygenator;  Surgeon: Toby Hernandez MD;  [facility]     TRANSPLANT LUNG RECIPIENT SINGLE X2 Bilateral 3/1/2018    Procedure: TRANSPLANT LUNG RECIPIENT SINGLE X2;  Median Sternotomy, Extracorporeal Membrane Oxygenator, bilateral sequential lung transplant;  Surgeon: Toby Hernandez MD;  [facility]       Social History   I have reviewed this patient's social history and updated it with pertinent information if needed.  Social History      Tobacco Use     Smoking status: Never Smoker     Smokeless tobacco: Never Used   Substance Use Topics     Alcohol use: No     Alcohol/week: 1.0 standard drinks     Types: 1 Glasses of wine per week     Drug use: No       Family History   I have reviewed this patient's family history and updated it with pertinent information if needed.  Family History   Problem Relation Age of Onset     Hypertension Mother      Arthritis Mother      Pancreatic Cancer Father      Diabetes Father        Prior to Admission Medications   Prior to Admission Medications   Prescriptions Last Dose Informant Patient Reported? Taking?   Magnesium Cl-Calcium Carbonate (SLOW-MAG) 71.5-119 MG TBEC 2021 at 0900  Yes Yes   Sig: Take 3 tablets by mouth four times a week Take on non dialysis days ///   acetaminophen (TYLENOL) 325 MG tablet Past Week at Unknown time  No Yes   Sig: Take 1 tablet (325 mg) by mouth every 4 hours as needed for mild pain or fever   albuterol (PROVENTIL) (2.5 MG/3ML) 0.083% neb solution 2021 at Unknown time  Yes Yes   Sig: Take 1 vial (2.5 mg) by nebulization 2 times daily   blood glucose (NO BRAND SPECIFIED) test strip Unknown at Unknown time  No No   Sig: Use to test blood sugar 4 times daily or as directed.   blood glucose (NO BRAND SPECIFIED) test strip Unknown at Unknown time  No No   Sig: Use to test blood sugar 4 times daily or as directed.   budesonide (PULMICORT) 0.5 MG/2ML neb solution 2021 at Unknown time  Yes Yes   Sig: Take 2 mLs (0.5 mg) by nebulization 2 times daily   fluticasone (FLONASE) 50 MCG/ACT nasal spray Past Week at Unknown time  No Yes   Sig: Spray 1 spray into both nostrils daily   metoprolol tartrate (LOPRESSOR) 25 MG tablet 2021 at 0900  Yes Yes   Si tablet (25 mg) by Oral or Feeding Tube route 2 times daily   multivitamin RENAL (RENAVITE RX/NEPHROVITE) 1 MG tablet 2021 at Unknown time  No Yes   Sig: Take 1 tablet by mouth daily   ondansetron (ZOFRAN-ODT) 4  MG ODT tab More than a month at Unknown time  No No   Sig: Take 1 tablet (4 mg) by mouth every 6 hours as needed for nausea or vomiting   pantoprazole (PROTONIX) 40 MG EC tablet 4/29/2021 at 0900  No Yes   Sig: Take 1 tablet (40 mg) by mouth every morning (before breakfast)   posaconazole (NOXAFIL) 100 MG EC tablet 4/29/2021 at 0900  No Yes   Sig: Take 3 tablets (300 mg) by mouth daily   predniSONE (DELTASONE) 2.5 MG tablet Unknown at Unknown time  No No   Sig: Take 1 tablet (2.5 mg) by mouth every evening   predniSONE (DELTASONE) 5 MG tablet 4/29/2021 at 0900  No Yes   Sig: Take 1 tablet (5 mg) by mouth every morning   sulfamethoxazole-trimethoprim (BACTRIM) 400-80 MG tablet 4/28/2021 at Unknown time  No Yes   Sig: Take 1 tablet by mouth Every Mon, Wed, Fri Morning   tacrolimus (GENERIC EQUIVALENT) 0.5 MG capsule 4/29/2021 at 0900  No Yes   Sig: Take 1 capsule (0.5 mg) by mouth every morning Total dose: 0.5 mg in the AM and 1 mg in the PM   tacrolimus (GENERIC EQUIVALENT) 1 MG capsule 4/28/2021 at Unknown time  No Yes   Sig: Take 1 capsule (1 mg) by mouth every evening Total dose: 0.5 mg in the AM and 1 mg in the PM      Facility-Administered Medications: None     Allergies   No Known Allergies    Physical Exam   Vital Signs: Temp: 98.2  F (36.8  C) Temp src: Oral BP: 134/76 Pulse: 85   Resp: 16 SpO2: 95 % O2 Device: None (Room air) Oxygen Delivery: 2 LPM  Weight: 115 lbs 0 oz    GENERAL: Alert and oriented x 3. Well nourished, well developed.  No acute distress.    HEENT: Normocephalic, atraumatic. Anicteric sclera. Mucous membranes moist.   CV: RRR. S1, S2. No murmurs appreciated.   RESPIRATORY: Effort normal on room air. Lungs with soft rhonchi RLL and diminished LLL but overall with good movement and non-labored effort.  GI: Abdomen soft and non distended, bowel sounds present x all 4 quadrants. No tenderness, rebound, or guarding.   NEUROLOGICAL: No focal deficits. Follows commands.  Strength equalin upper and  lower extremities.   MUSCULOSKELETAL: No joint swelling or tenderness. Moves all extremities.   EXTREMITIES: No gross deformities. No peripheral edema.   SKIN: Grossly warm, dry, and intact. No jaundice. No rashes.       Data   Data reviewed today: I reviewed all medications, new labs and imaging results over the last 24 hours.     Recent Labs   Lab 04/29/21  1020   WBC 6.3   HGB 11.1*         INR 0.99     Most Recent 3 CBC's:  Recent Labs   Lab Test 04/29/21  1020 04/08/21  0722 03/29/21   WBC 6.3 6.3 8.4   HGB 11.1* 9.7* 10.5*    100 103.1    178 176     Most Recent 3 BMP's:  Recent Labs   Lab Test 04/08/21  0722 03/29/21 03/04/21  0611    141 133   POTASSIUM 3.7 4.3 4.5   CHLORIDE 101 101 98   CO2 32 25 23   BUN 22 49.5  11.00 83*   CR 2.49* 4.50 3.98*   ANIONGAP 5 19.3 12   CHELSEY 7.9* 7.9 7.9*   * 122* 127*     Most Recent 2 LFT's:  Recent Labs   Lab Test 03/29/21 03/01/21  0637   AST 19 17   ALT 36 49   ALKPHOS 288 339*   BILITOTAL 0.9 0.7     Most Recent 3 INR's:  Recent Labs   Lab Test 04/29/21  1020 04/08/21  0722 02/19/21  0458   INR 0.99 1.04 0.99     Recent Results (from the past 24 hour(s))   CT Chest Tube with Cath Placement    Narrative    Examination 4/29/2021:   Left chest tube placement under CT guidance.    HISTORY:    60-year-old female with left pleural collection. Drainage  of left pleural collection requested.    COMPARISON: CT chest 4/8/2021.    Staff: BHAVANI Mckeon MD.    Fellow: Meet Rich M.D.    Resident: Chris Wildre MD.    MEDICATIONS: Continuous monitoring of intravenous conscious sedation  was performed by the Radiology nurse. Vital signs throughout the  procedure remained stable.      Medications:  1. Fentanyl 100 mcg IV  2. Versed 2 mg IV  3. 1% lidocaine 10 ml local anesthesia    Face-to-face sedation time: 20 minutes    Description of procedure: Verbal and written consent were obtained  prior to procedure. The patient was fully  aware of the benefits and  risks of the procedure. All questions were answered to the patient's  satisfaction.    The patient was prepped and draped in the usual sterile fashion, in  right lateral decubitus position. A preprocedural CT scan was  performed which demonstrated left pleural collection. Left back was  prepped and draped in usual sterile fashion. Under CT fluoroscopic  guidance, approximately 10 cc of 1% lidocaine was injected  subcutaneously for skin anesthesia, including the planned tract to the  level of the pleura. A small skin nick was made with a #11 blade  scalpel. Next, a 5 Barbadian Sand 9eh catheter was advanced into the pleural  space under ultrasound guidance. Once the needle catheter system was  in the pleural space, the needle was fixed and the catheter was  advanced. The needle was removed and exchanged for a 0.035 stiff  Amplatz wire which was coiled in the left pleural collection. The  tract was dilated with 12 and 14 Barbadian dilators. Subsequently, a 14  Barbadian nonlocking pigtail catheter was advanced into the left pleural  collection. About 30 cc of thick pus aspirated and sent to the  laboratory for analysis. The chest tube was placed to water seal.     The patient tolerated the procedure well and was without complaint.  The patient was transferred in stable condition.      Impression    IMPRESSION:   Successful placement of a 14 Barbadian nonlocking pigtail catheter chest  tube in left pleural collection. About 30 cc of thick pus aspirated  and sent to the laboratory for analysis.    PLAN:   Chest tube to waterseal drainage and low wall suction..

## 2021-04-30 ENCOUNTER — APPOINTMENT (OUTPATIENT)
Dept: GENERAL RADIOLOGY | Facility: CLINIC | Age: 61
End: 2021-04-30
Attending: NURSE PRACTITIONER
Payer: COMMERCIAL

## 2021-04-30 ENCOUNTER — ANESTHESIA (OUTPATIENT)
Dept: SURGERY | Facility: CLINIC | Age: 61
End: 2021-04-30
Payer: COMMERCIAL

## 2021-04-30 DIAGNOSIS — J98.09 BRONCHIAL STENOSIS, RIGHT: Primary | ICD-10-CM

## 2021-04-30 LAB
ANION GAP SERPL CALCULATED.3IONS-SCNC: 8 MMOL/L (ref 3–14)
BUN SERPL-MCNC: 45 MG/DL (ref 7–30)
CALCIUM SERPL-MCNC: 8.6 MG/DL (ref 8.5–10.1)
CHLORIDE SERPL-SCNC: 101 MMOL/L (ref 94–109)
CO2 SERPL-SCNC: 27 MMOL/L (ref 20–32)
CREAT SERPL-MCNC: 3.4 MG/DL (ref 0.52–1.04)
ERYTHROCYTE [DISTWIDTH] IN BLOOD BY AUTOMATED COUNT: 13.9 % (ref 10–15)
GFR SERPL CREATININE-BSD FRML MDRD: 14 ML/MIN/{1.73_M2}
GLUCOSE BLDC GLUCOMTR-MCNC: 110 MG/DL (ref 70–99)
GLUCOSE SERPL-MCNC: 130 MG/DL (ref 70–99)
HBV SURFACE AB SERPL IA-ACNC: 0 M[IU]/ML
HBV SURFACE AG SERPL QL IA: NONREACTIVE
HCT VFR BLD AUTO: 34.4 % (ref 35–47)
HGB BLD-MCNC: 10.9 G/DL (ref 11.7–15.7)
MAGNESIUM SERPL-MCNC: 1.7 MG/DL (ref 1.6–2.3)
MCH RBC QN AUTO: 32 PG (ref 26.5–33)
MCHC RBC AUTO-ENTMCNC: 31.7 G/DL (ref 31.5–36.5)
MCV RBC AUTO: 101 FL (ref 78–100)
PLATELET # BLD AUTO: 216 10E9/L (ref 150–450)
POTASSIUM SERPL-SCNC: 4.1 MMOL/L (ref 3.4–5.3)
RBC # BLD AUTO: 3.41 10E12/L (ref 3.8–5.2)
SODIUM SERPL-SCNC: 136 MMOL/L (ref 133–144)
TACROLIMUS BLD-MCNC: 15.3 UG/L (ref 5–15)
TME LAST DOSE: ABNORMAL H
WBC # BLD AUTO: 6.8 10E9/L (ref 4–11)

## 2021-04-30 PROCEDURE — 5A1D70Z PERFORMANCE OF URINARY FILTRATION, INTERMITTENT, LESS THAN 6 HOURS PER DAY: ICD-10-PCS | Performed by: INTERNAL MEDICINE

## 2021-04-30 PROCEDURE — 250N000009 HC RX 250: Performed by: NURSE PRACTITIONER

## 2021-04-30 PROCEDURE — 214N000001 HC R&B CCU UMMC

## 2021-04-30 PROCEDURE — 84157 ASSAY OF PROTEIN OTHER: CPT | Performed by: NURSE PRACTITIONER

## 2021-04-30 PROCEDURE — 86706 HEP B SURFACE ANTIBODY: CPT | Performed by: STUDENT IN AN ORGANIZED HEALTH CARE EDUCATION/TRAINING PROGRAM

## 2021-04-30 PROCEDURE — 87340 HEPATITIS B SURFACE AG IA: CPT | Performed by: STUDENT IN AN ORGANIZED HEALTH CARE EDUCATION/TRAINING PROGRAM

## 2021-04-30 PROCEDURE — 258N000003 HC RX IP 258 OP 636: Performed by: ANESTHESIOLOGY

## 2021-04-30 PROCEDURE — 370N000017 HC ANESTHESIA TECHNICAL FEE, PER MIN: Performed by: INTERNAL MEDICINE

## 2021-04-30 PROCEDURE — 36415 COLL VENOUS BLD VENIPUNCTURE: CPT | Performed by: NURSE PRACTITIONER

## 2021-04-30 PROCEDURE — 999N000157 HC STATISTIC RCP TIME EA 10 MIN

## 2021-04-30 PROCEDURE — 85027 COMPLETE CBC AUTOMATED: CPT | Performed by: NURSE PRACTITIONER

## 2021-04-30 PROCEDURE — 250N000013 HC RX MED GY IP 250 OP 250 PS 637: Performed by: NURSE PRACTITIONER

## 2021-04-30 PROCEDURE — 0BN38ZZ RELEASE RIGHT MAIN BRONCHUS, VIA NATURAL OR ARTIFICIAL OPENING ENDOSCOPIC: ICD-10-PCS | Performed by: INTERNAL MEDICINE

## 2021-04-30 PROCEDURE — 250N000012 HC RX MED GY IP 250 OP 636 PS 637: Performed by: NURSE PRACTITIONER

## 2021-04-30 PROCEDURE — 999N000141 HC STATISTIC PRE-PROCEDURE NURSING ASSESSMENT: Performed by: INTERNAL MEDICINE

## 2021-04-30 PROCEDURE — 89051 BODY FLUID CELL COUNT: CPT | Performed by: NURSE PRACTITIONER

## 2021-04-30 PROCEDURE — 80197 ASSAY OF TACROLIMUS: CPT | Performed by: NURSE PRACTITIONER

## 2021-04-30 PROCEDURE — 82465 ASSAY BLD/SERUM CHOLESTEROL: CPT | Performed by: NURSE PRACTITIONER

## 2021-04-30 PROCEDURE — 94640 AIRWAY INHALATION TREATMENT: CPT

## 2021-04-30 PROCEDURE — 31641 BRONCHOSCOPY TREAT BLOCKAGE: CPT | Mod: GC | Performed by: INTERNAL MEDICINE

## 2021-04-30 PROCEDURE — 87449 NOS EACH ORGANISM AG IA: CPT | Performed by: NURSE PRACTITIONER

## 2021-04-30 PROCEDURE — 250N000011 HC RX IP 250 OP 636: Performed by: NURSE ANESTHETIST, CERTIFIED REGISTERED

## 2021-04-30 PROCEDURE — 360N000077 HC SURGERY LEVEL 4, PER MIN: Performed by: INTERNAL MEDICINE

## 2021-04-30 PROCEDURE — 87305 ASPERGILLUS AG IA: CPT | Performed by: NURSE PRACTITIONER

## 2021-04-30 PROCEDURE — 83735 ASSAY OF MAGNESIUM: CPT | Performed by: NURSE PRACTITIONER

## 2021-04-30 PROCEDURE — 83615 LACTATE (LD) (LDH) ENZYME: CPT | Performed by: NURSE PRACTITIONER

## 2021-04-30 PROCEDURE — 94640 AIRWAY INHALATION TREATMENT: CPT | Mod: 76

## 2021-04-30 PROCEDURE — 710N000010 HC RECOVERY PHASE 1, LEVEL 2, PER MIN: Performed by: INTERNAL MEDICINE

## 2021-04-30 PROCEDURE — 71046 X-RAY EXAM CHEST 2 VIEWS: CPT | Mod: 26 | Performed by: RADIOLOGY

## 2021-04-30 PROCEDURE — 99207 PR CDG-CUT & PASTE-POTENTIAL IMPACT ON LEVEL: CPT | Performed by: INTERNAL MEDICINE

## 2021-04-30 PROCEDURE — 99233 SBSQ HOSP IP/OBS HIGH 50: CPT | Performed by: INTERNAL MEDICINE

## 2021-04-30 PROCEDURE — 999N001017 HC STATISTIC GLUCOSE BY METER IP

## 2021-04-30 PROCEDURE — 99254 IP/OBS CNSLTJ NEW/EST MOD 60: CPT | Mod: 24 | Performed by: INTERNAL MEDICINE

## 2021-04-30 PROCEDURE — 250N000009 HC RX 250: Performed by: NURSE ANESTHETIST, CERTIFIED REGISTERED

## 2021-04-30 PROCEDURE — 80048 BASIC METABOLIC PNL TOTAL CA: CPT | Performed by: NURSE PRACTITIONER

## 2021-04-30 PROCEDURE — 999N000065 XR CHEST 2 VW

## 2021-04-30 PROCEDURE — 84478 ASSAY OF TRIGLYCERIDES: CPT | Performed by: NURSE PRACTITIONER

## 2021-04-30 PROCEDURE — 272N000002 HC OR SUPPLY OTHER OPNP: Performed by: INTERNAL MEDICINE

## 2021-04-30 PROCEDURE — 90937 HEMODIALYSIS REPEATED EVAL: CPT

## 2021-04-30 PROCEDURE — 99254 IP/OBS CNSLTJ NEW/EST MOD 60: CPT | Performed by: INTERNAL MEDICINE

## 2021-04-30 RX ORDER — PARICALCITOL 5 UG/ML
2 INJECTION, SOLUTION INTRAVENOUS
Status: DISCONTINUED | OUTPATIENT
Start: 2021-04-30 | End: 2021-04-30

## 2021-04-30 RX ORDER — LIDOCAINE HYDROCHLORIDE 20 MG/ML
INJECTION, SOLUTION INFILTRATION; PERINEURAL PRN
Status: DISCONTINUED | OUTPATIENT
Start: 2021-04-30 | End: 2021-04-30

## 2021-04-30 RX ORDER — ACETYLCYSTEINE 200 MG/ML
2 SOLUTION ORAL; RESPIRATORY (INHALATION) 2 TIMES DAILY
Status: DISCONTINUED | OUTPATIENT
Start: 2021-04-30 | End: 2021-05-07 | Stop reason: HOSPADM

## 2021-04-30 RX ORDER — ONDANSETRON 2 MG/ML
INJECTION INTRAMUSCULAR; INTRAVENOUS PRN
Status: DISCONTINUED | OUTPATIENT
Start: 2021-04-30 | End: 2021-04-30

## 2021-04-30 RX ORDER — FENTANYL CITRATE 50 UG/ML
INJECTION, SOLUTION INTRAMUSCULAR; INTRAVENOUS PRN
Status: DISCONTINUED | OUTPATIENT
Start: 2021-04-30 | End: 2021-04-30

## 2021-04-30 RX ORDER — FENTANYL CITRATE 50 UG/ML
25-50 INJECTION, SOLUTION INTRAMUSCULAR; INTRAVENOUS
Status: DISCONTINUED | OUTPATIENT
Start: 2021-04-30 | End: 2021-04-30 | Stop reason: HOSPADM

## 2021-04-30 RX ORDER — SODIUM CHLORIDE, SODIUM LACTATE, POTASSIUM CHLORIDE, CALCIUM CHLORIDE 600; 310; 30; 20 MG/100ML; MG/100ML; MG/100ML; MG/100ML
INJECTION, SOLUTION INTRAVENOUS CONTINUOUS
Status: DISCONTINUED | OUTPATIENT
Start: 2021-04-30 | End: 2021-04-30 | Stop reason: HOSPADM

## 2021-04-30 RX ORDER — TACROLIMUS 0.5 MG/1
0.5 CAPSULE ORAL
Status: DISCONTINUED | OUTPATIENT
Start: 2021-04-30 | End: 2021-05-06

## 2021-04-30 RX ORDER — NALOXONE HYDROCHLORIDE 0.4 MG/ML
0.4 INJECTION, SOLUTION INTRAMUSCULAR; INTRAVENOUS; SUBCUTANEOUS
Status: DISCONTINUED | OUTPATIENT
Start: 2021-04-30 | End: 2021-05-07 | Stop reason: HOSPADM

## 2021-04-30 RX ORDER — ONDANSETRON 4 MG/1
4 TABLET, ORALLY DISINTEGRATING ORAL EVERY 30 MIN PRN
Status: DISCONTINUED | OUTPATIENT
Start: 2021-04-30 | End: 2021-04-30 | Stop reason: HOSPADM

## 2021-04-30 RX ORDER — PROPOFOL 10 MG/ML
INJECTION, EMULSION INTRAVENOUS PRN
Status: DISCONTINUED | OUTPATIENT
Start: 2021-04-30 | End: 2021-04-30

## 2021-04-30 RX ORDER — ONDANSETRON 2 MG/ML
4 INJECTION INTRAMUSCULAR; INTRAVENOUS EVERY 30 MIN PRN
Status: DISCONTINUED | OUTPATIENT
Start: 2021-04-30 | End: 2021-04-30 | Stop reason: HOSPADM

## 2021-04-30 RX ORDER — LIDOCAINE 40 MG/G
CREAM TOPICAL
Status: DISCONTINUED | OUTPATIENT
Start: 2021-04-30 | End: 2021-04-30 | Stop reason: HOSPADM

## 2021-04-30 RX ORDER — DEXAMETHASONE SODIUM PHOSPHATE 4 MG/ML
INJECTION, SOLUTION INTRA-ARTICULAR; INTRALESIONAL; INTRAMUSCULAR; INTRAVENOUS; SOFT TISSUE PRN
Status: DISCONTINUED | OUTPATIENT
Start: 2021-04-30 | End: 2021-04-30

## 2021-04-30 RX ORDER — NALOXONE HYDROCHLORIDE 0.4 MG/ML
0.2 INJECTION, SOLUTION INTRAMUSCULAR; INTRAVENOUS; SUBCUTANEOUS
Status: DISCONTINUED | OUTPATIENT
Start: 2021-04-30 | End: 2021-05-07 | Stop reason: HOSPADM

## 2021-04-30 RX ORDER — PROPOFOL 10 MG/ML
INJECTION, EMULSION INTRAVENOUS CONTINUOUS PRN
Status: DISCONTINUED | OUTPATIENT
Start: 2021-04-30 | End: 2021-04-30

## 2021-04-30 RX ADMIN — FENTANYL CITRATE 100 MCG: 50 INJECTION, SOLUTION INTRAMUSCULAR; INTRAVENOUS at 16:37

## 2021-04-30 RX ADMIN — SULFAMETHOXAZOLE AND TRIMETHOPRIM 1 TABLET: 400; 80 TABLET ORAL at 09:53

## 2021-04-30 RX ADMIN — ACETYLCYSTEINE 2 ML: 200 SOLUTION ORAL; RESPIRATORY (INHALATION) at 20:42

## 2021-04-30 RX ADMIN — PROPOFOL 150 MCG/KG/MIN: 10 INJECTION, EMULSION INTRAVENOUS at 16:39

## 2021-04-30 RX ADMIN — LIDOCAINE HYDROCHLORIDE 100 MG: 20 INJECTION, SOLUTION INFILTRATION; PERINEURAL at 16:37

## 2021-04-30 RX ADMIN — Medication 40 MG: at 16:38

## 2021-04-30 RX ADMIN — POSACONAZOLE 300 MG: 100 TABLET, COATED ORAL at 09:53

## 2021-04-30 RX ADMIN — SUGAMMADEX 200 MG: 100 INJECTION, SOLUTION INTRAVENOUS at 16:54

## 2021-04-30 RX ADMIN — SODIUM CHLORIDE, POTASSIUM CHLORIDE, SODIUM LACTATE AND CALCIUM CHLORIDE: 600; 310; 30; 20 INJECTION, SOLUTION INTRAVENOUS at 16:29

## 2021-04-30 RX ADMIN — PREDNISONE 5 MG: 5 TABLET ORAL at 09:54

## 2021-04-30 RX ADMIN — BUDESONIDE 0.5 MG: 0.5 INHALANT RESPIRATORY (INHALATION) at 20:42

## 2021-04-30 RX ADMIN — ACETAMINOPHEN 650 MG: 325 TABLET, FILM COATED ORAL at 20:13

## 2021-04-30 RX ADMIN — PROPOFOL 80 MG: 10 INJECTION, EMULSION INTRAVENOUS at 16:38

## 2021-04-30 RX ADMIN — FLUTICASONE PROPIONATE 1 SPRAY: 50 SPRAY, METERED NASAL at 09:56

## 2021-04-30 RX ADMIN — METOPROLOL TARTRATE 25 MG: 25 TABLET, FILM COATED ORAL at 18:55

## 2021-04-30 RX ADMIN — Medication: at 18:58

## 2021-04-30 RX ADMIN — TACROLIMUS 0.5 MG: 0.5 CAPSULE ORAL at 09:54

## 2021-04-30 RX ADMIN — PANTOPRAZOLE SODIUM 40 MG: 40 TABLET, DELAYED RELEASE ORAL at 06:39

## 2021-04-30 RX ADMIN — ACETAMINOPHEN 650 MG: 325 TABLET, FILM COATED ORAL at 03:05

## 2021-04-30 RX ADMIN — ROCURONIUM BROMIDE 30 MG: 10 INJECTION INTRAVENOUS at 16:40

## 2021-04-30 RX ADMIN — DEXAMETHASONE SODIUM PHOSPHATE 6 MG: 4 INJECTION, SOLUTION INTRA-ARTICULAR; INTRALESIONAL; INTRAMUSCULAR; INTRAVENOUS; SOFT TISSUE at 16:51

## 2021-04-30 RX ADMIN — PREDNISONE 2.5 MG: 2.5 TABLET ORAL at 22:07

## 2021-04-30 RX ADMIN — BUDESONIDE 0.5 MG: 0.5 INHALANT RESPIRATORY (INHALATION) at 08:24

## 2021-04-30 RX ADMIN — ALBUTEROL SULFATE 2.5 MG: 2.5 SOLUTION RESPIRATORY (INHALATION) at 08:23

## 2021-04-30 RX ADMIN — ALBUTEROL SULFATE 2.5 MG: 2.5 SOLUTION RESPIRATORY (INHALATION) at 20:42

## 2021-04-30 RX ADMIN — TACROLIMUS 0.5 MG: 0.5 CAPSULE ORAL at 18:54

## 2021-04-30 RX ADMIN — ONDANSETRON 4 MG: 2 INJECTION INTRAMUSCULAR; INTRAVENOUS at 16:55

## 2021-04-30 ASSESSMENT — ACTIVITIES OF DAILY LIVING (ADL)
ADLS_ACUITY_SCORE: 13
ADLS_ACUITY_SCORE: 15
ADLS_ACUITY_SCORE: 13
ADLS_ACUITY_SCORE: 13

## 2021-04-30 ASSESSMENT — MIFFLIN-ST. JEOR
SCORE: 1058.97
SCORE: 1059.13

## 2021-04-30 NOTE — ANESTHESIA POSTPROCEDURE EVALUATION
Patient: Kecia Blue    Procedure(s):  Flexible and Rigid Bronchoscopy and Tissue Debulking with CO2 Laser Assistance    Diagnosis:Bronchial stenosis, right [J98.09]  Diagnosis Additional Information: No value filed.    Anesthesia Type:  General    Note:  Disposition: Inpatient   Postop Pain Control: Uneventful            Sign Out: Well controlled pain   PONV: No   Neuro/Psych: Uneventful            Sign Out: Acceptable/Baseline neuro status   Airway/Respiratory: Uneventful            Sign Out: Acceptable/Baseline resp. status   CV/Hemodynamics: Uneventful            Sign Out: Acceptable CV status; No obvious hypovolemia; No obvious fluid overload   Other NRE: NONE   DID A NON-ROUTINE EVENT OCCUR? No           Last vitals:  Vitals:    04/30/21 1716 04/30/21 1723 04/30/21 1730   BP: 132/89  (!) 143/83   Pulse: 98  81   Resp: 24  19   Temp: 37.3  C (99.1  F)     SpO2: 97% 94%        Last vitals prior to Anesthesia Care Transfer:  CRNA VITALS  4/30/2021 1635 - 4/30/2021 1735      4/30/2021             Temp 2:  (!) 0  C (32  F)          Electronically Signed By: Nura Whitaker MD  April 30, 2021  5:50 PM

## 2021-04-30 NOTE — PROGRESS NOTES
[facility]    Medicine Progress Note - Hospitalist Service       Date of Admission:  4/29/2021  Assessment & Plan      Kecia Blue is a 60 year old female with past medical history significant for ILD s/p bilateral lung transplant (3/1/2018) 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  Pleural fluid cx obtained.      - Plan is for bronchoscopy on 4/30 w/ Dr. Norris for possible dilation and stent/tissue debulking.   - Transplant Pulmonary following, appreciate recommendations (Detials of immunosuppresion as in there note)  - IS: continue Prednisone 5mg/2.5mg, [place] (goal 8-10)   - Continue PTA Posaconazole 300mg daily   - Continue PTA Bactrim on MWF   - Continue Albuterol nebs BID  - Continue Pulmicort nebs BID   - Continue Flonase daily   - Follow up CT chest in 7/2021 per Transplant ID     # ESRD on HD MWF - Follows lee/ [facility] under Dr. Glasgow via AVF/G.  On HD since 11/2019.  Non-oliguiric, mostly urinates in the morning hours.    - Nephrology consult to continue HD      # Hx PAF, SVT - Per chart review, hx of AF w/ RVR during periods of acute illness and acute respiratory failure.  Follows w/ [facility], last seen by Dr. Mcmahan on 4/21.  Outpt Ziopatch study neg for AF, though did have frequent runs of SVT but was asymptomatic.  Currently on Metoprolol 25mg BID  "for rate control.  [id number] 1.  Per Cardiology, no indication for anticoagulation at this time.   - Continue PTA Metoprolol w/ hold parameters   - Follow up with Cards EP as needed     # Hypomagnesemia -   Currently on Slow-Mag 71.5-119mg on non-HD days.   - Continue PTA Slow-mag supplementation   - Ensure stable lytes given hx SVT, PAF      # Anemia - Likely 2/2 ESRD.       Diet: NPO per Anesthesia Guidelines for Procedure/Surgery Except for: No Exceptions    DVT Prophylaxis: Pneumatic Compression Devices  Basilio Catheter: not present  Code Status: Full Code           Disposition Plan   Expected discharge: 2 - 3 days, recommended to prior living arrangement once breahting stable and plan for chest tube.  Entered: Maxim Norman MD, MD 04/30/2021, 2:15 PM       The patient's care was discussed with the Bedside Nurse, Care Coordinator/ and Patient.    Maxim Norman MD, MD  Hospitalist Service  [facility]  Contact information available via [facility] Paging/Directory  Please see sign in/sign out for up to date coverage information  ______________________________________________________________________    Interval History   History reviewed  Denies Chest pain/ SOB/ cough, Denies any N&V/ bowel problems  Denies urinary problems , Denies fever/chills/rigors     Data reviewed today: I reviewed all medications, new labs and imaging results over the last 24 hours. I personally reviewed the chest x-ray image(s) showing as below.    Physical Exam   BP (!) 156/88   Pulse 85   Temp 98  F (36.7  C)   Resp 12   Ht 1.6 m (5' 3\")   Wt 51 kg (112 lb 7 oz)   LMP 06/07/2014 (Exact Date)   SpO2 100%   BMI 19.92 kg/m    Gen- pleasant  lying in bed  HEENT- NAD, DILIP  Neck- supple, no JVD elevation, no thyromegaly  CVS- I+II+ no m/r/g  RS- CTAB, decreased at base with LL - chest tube   Abdo- soft, no tenderness . No g/r/r  Ext- no edema   CNS- no focal signs     Data  "   BMP  Recent Labs   Lab 04/30/21  0606      POTASSIUM 4.1   CHLORIDE 101   CHELSEY 8.6   CO2 27   BUN 45*   CR 3.40*   *     CBC  Recent Labs   Lab 04/30/21  0606 04/29/21  1020   WBC 6.8 6.3   RBC 3.41* 3.45*   HGB 10.9* 11.1*   HCT 34.4* 34.5*   * 100   MCH 32.0 32.2   MCHC 31.7 32.2   RDW 13.9 13.9    218     INR  Recent Labs   Lab 04/29/21  1020   INR 0.99         Recent Results (from the past 24 hour(s))   XR Chest 2 Views    Narrative    EXAM: XR CHEST 2 VW  4/30/2021 11:06 AM     HISTORY:  s/p chest tube placement, h/o BSLT       COMPARISON:  Chest x-ray 4/8/2021. CT chest 4/29/2021    FINDINGS:   PA and lateral radiographs of the chest. Postsurgical changes of  bilateral lung transplant with intact median sternotomy wires. New  posterior approach left pigtail drain. Streaky bibasilar studies. No  significant pleural effusion. No pneumothorax. Accessory azygous lobe.  Surgical clips project over the right axilla. Multiple air distended  loops of bowel in the visualized upper abdomen.      Impression    IMPRESSION:   1. New left basilar chest tube.  2. Streaky perihilar and bibasilar opacities, likely atelectasis and  edema.    I have personally reviewed the examination and initial interpretation  and I agree with the findings.    ADAM GONZALEZ MD

## 2021-04-30 NOTE — OR NURSING
Dr. Strong contacted for sign out, will complete when able. Patient OK to proceed to next level of care

## 2021-04-30 NOTE — PROGRESS NOTES
No significant events overnight. Patient was pleasant with interaction. Negative assessment. Vitals are stable. Regular diet. Patient is currently NPO for planned bronchoscopy this morning. NSR. 16 output on chest tube. No leak and his to suction. Continue to monitor and continue plan of care.

## 2021-04-30 NOTE — PROGRESS NOTES
HEMODIALYSIS TREATMENT NOTE    Date: 4/30/2021  Time: 1:07 PM    Data:  Pre Wt:   51 kg  Desired Wt: 49 kg   Post Wt:  49.2kg  Weight change:  1.8 kg  Ultrafiltration - Post Run Net Total Removed (mL):    Vascular Access Status: patent  Dialyzer Rinse:  streakerd  Total Blood Volume Processed: 65.06 L Liters  Total Dialysis (Treatment) Time: 3.5  Hours    Lab:   Yes- hep B SAB and SAG    Interventions/Assessment:  Hd initiated via L avg, 15g needles, 400bfr/600dfr, 2 ufg. Net removed 1.8. patient tolerated tx well, patient reported no pain or complaints during run., patient sent to PACU post run. Report given to Primary RN.      Plan:    As per renal

## 2021-04-30 NOTE — ANESTHESIA PREPROCEDURE EVALUATION
Anesthesia Pre-Procedure Evaluation    Patient: Kecia Blue   MRN: [id number] : 1960        Preoperative Diagnosis: Bronchial stenosis, right [J98.09]   Procedure : Procedure(s):  BRONCHOSCOPY, flexible and rigid, airway dilation, tissue debulking with CO2 laser assistance, possible stent placement     Past Medical History:   Diagnosis Date     Acute on chronic respiratory failure with hypoxia (H) 2018     Anisocoria      Antisynthetase syndrome (H)      Anxiety      Aspergillus (H)      Aspergillus pneumonia (H) 2020     Benign essential hypertension      C. difficile colitis      Cytomegalovirus (CMV) viremia (H)      Dermatomyositis (H)      Dysplasia of cervix, low grade (ESTRADA 1)      EBV (Franklin-Barr virus) viremia      ESRD (end stage renal disease) on dialysis (H)      ILD (interstitial lung disease) (H)      Lung replaced by transplant (H)      Osteopenia      Paroxysmal atrial fibrillation (H)      Pneumocystis jiroveci pneumonia (H)      PONV (postoperative nausea and vomiting)      Post-menopause      Pulmonary hypertension (H)      Raynaud's disease      Seronegative rheumatoid arthritis (H)       Past Surgical History:   Procedure Laterality Date     BRONCHOSCOPY (RIGID OR FLEXIBLE), DIAGNOSTIC N/A 4/10/2018    Procedure: COMBINED BRONCHOSCOPY (RIGID OR FLEXIBLE), LAVAGE;;  Surgeon: Mariposa Donohue MD;  Location: [facility]     BRONCHOSCOPY (RIGID OR FLEXIBLE), DIAGNOSTIC N/A 2020    Procedure: BRONCHOSCOPY, WITH BRONCHOALVEOLAR LAVAGE;  Surgeon: Uri Bass MD;  Location: [facility]     BRONCHOSCOPY (RIGID OR FLEXIBLE), DILATE BRONCHUS / TRACHEA N/A 10/11/2018    Procedure: BRONCHOSCOPY (RIGID OR FLEXIBLE), DILATE BRONCHUS / TRACHEA;  Flexible And Rigid Bronchoscopy and Dilation;  Surgeon: Wilber Lin MD;  [facility]: [facility]     BRONCHOSCOPY FLEXIBLE N/A 3/13/2018    Procedure: BRONCHOSCOPY FLEXIBLE;  Flexible Bronchoscopy ;  Surgeon: Gissell Sanchez,  MD;  Location: [facility]     BRONCHOSCOPY FLEXIBLE N/A 5/9/2018    Procedure: BRONCHOSCOPY FLEXIBLE;;  Surgeon: Wilber Lin MD;  Location: [facility]     BRONCHOSCOPY FLEXIBLE AND RIGID N/A 9/10/2018    Procedure: BRONCHOSCOPY FLEXIBLE AND RIGID;  Flexible and Rigid Bronchoscopy with Balloon Dilation, tissue debulking with cryo, and Right mainstem bronchus stent placement;  Surgeon: Wilber Lin MD;  [facility]     BRONCHOSCOPY RIGID N/A 6/6/2018    Procedure: BRONCHOSCOPY RIGID;;  Surgeon: Lopez Macias MD;  Location: [facility]     BRONCHOSCOPY, DILATE BRONCHUS, STENT BRONCHUS, COMBINED N/A 6/11/2018    Procedure: COMBINED BRONCHOSCOPY, DILATE BRONCHUS, STENT BRONCHUS;  Flexible Bronchoscopy, Balloon Dilation, Bronchial Washings;  Surgeon: Wilber Lin MD;  [facility]     BRONCHOSCOPY, DILATE BRONCHUS, STENT BRONCHUS, COMBINED Right 7/10/2018    Procedure: COMBINED BRONCHOSCOPY, DILATE BRONCHUS, STENT BRONCHUS;  Flexible Bronchoscopy, Balloon Dilation, Bronchial Washings  ;  Surgeon: Wilber Lin MD;  [facility]     BRONCHOSCOPY, DILATE BRONCHUS, STENT BRONCHUS, COMBINED N/A 8/2/2018    Procedure: COMBINED BRONCHOSCOPY, DILATE BRONCHUS, STENT BRONCHUS;  Flexible Bronchoscopy, Bronchial Washings, Balloon Dilation;  Surgeon: Wilber Lin MD;  [facility]     BRONCHOSCOPY, DILATE BRONCHUS, STENT BRONCHUS, COMBINED N/A 8/20/2018    Procedure: COMBINED BRONCHOSCOPY, DILATE BRONCHUS, STENT BRONCHUS;  Flexible Bronchoscopy, Balloon Dilation;  Surgeon: Wilber Lin MD;  [facility]     BRONCHOSCOPY, DILATE BRONCHUS, STENT BRONCHUS, COMBINED N/A 10/29/2018    Procedure: Flexible Bronchoscopy, Balloon Dilation, Stent Revision, Airway Examination And Therapeutic Suctioning, Cyro Tumor Debulking;  Surgeon: Wilber Lin MD;  [facility]     BRONCHOSCOPY, DILATE BRONCHUS, STENT BRONCHUS, COMBINED N/A 11/20/2018    Procedure: Rigid  Bronchoscopy, Stent Removal and dilitation;  Surgeon: Wilber Lin MD;  [facility]     BRONCHOSCOPY, DILATE BRONCHUS, STENT BRONCHUS, COMBINED N/A 12/14/2018    Procedure: Flexible And Rigid Bronchoscopy, Balloon Dilation, Bronchial Washing;  Surgeon: Wilber Lin MD;  [facility]     BRONCHOSCOPY, DILATE BRONCHUS, STENT BRONCHUS, COMBINED N/A 1/17/2019    Procedure: Flexible And Rigid Bronchoscopy, Balloon Dilation.;  Surgeon: Wilber Lin MD;  [facility]     BRONCHOSCOPY, DILATE BRONCHUS, STENT BRONCHUS, COMBINED N/A 3/7/2019    Procedure: Flexible and Rigid Bronchoscopy, Bronchial Washing, Balloon Dilation;  Surgeon: Wilber Lin MD;  [facility]     BRONCHOSCOPY, DILATE BRONCHUS, STENT BRONCHUS, COMBINED N/A 6/6/2019    Procedure: Rigid and Flexible Bronchoscopy, Balloon Dilation;  Surgeon: Wilber Lin MD;  [facility]     BRONCHOSCOPY, DILATE BRONCHUS, STENT BRONCHUS, COMBINED N/A 10/11/2019    Procedure: Flexible and Rigid Bronchoscopy, Balloon Dilation, Bronchoalveolar Lagave;  Surgeon: Wilber Lin MD;  [facility]     BRONCHOSCOPY, DILATE BRONCHUS, STENT BRONCHUS, COMBINED N/A 2/19/2021    Procedure: BRONCHOSCOPY, flexible, airway dilation, and bronchial wash;  Surgeon: Wilber Lin MD;  [facility]     BRONCHOSCOPY, DILATE BRONCHUS, STENT BRONCHUS, COMBINED N/A 4/9/2021    Procedure: BRONCHOSCOPY, flexible and rigid, Airway suctioning;  Surgeon: Mati Norris MD;  [facility]     CV RIGHT HEART CATH MEASUREMENTS RECORDED N/A 3/10/2020    Procedure: CV RIGHT HEART CATH;  Surgeon: Wai Garcia MD;  [facility]     ENT SURGERY      tonsillectomy as a child     ESOPHAGOSCOPY, GASTROSCOPY, DUODENOSCOPY (EGD), COMBINED N/A 10/29/2018    Procedure: COMBINED ESOPHAGOSCOPY, GASTROSCOPY, DUODENOSCOPY (EGD) with biopsies and polypectomy;  Surgeon: Chente Bloom MD;   [facility]     INSERT EXTRACORPORAL MEMBRANE OXYGENATOR ADULT (OUTSIDE OR) N/A 2/27/2018    Procedure: INSERT EXTRACORPORAL MEMBRANE OXYGENATOR ADULT (OUTSIDE OR);  INSERT EXTRACORPORAL MEMBRANE OXYGENATOR ADULT (OUTSIDE OR) ;  Surgeon: Toby Hernandez MD;  [facility]     IR CVC TUNNEL PLACEMENT > 5 YRS OF AGE  10/25/2019     IR DIALYSIS FISTULOGRAM LEFT  3/2/2021     IR DIALYSIS MECH THROMB, PTA  3/2/2021     IR GASTRO JEJUNOSTOMY TUBE PLACEMENT  2/16/2021     IR PARACENTESIS  1/8/2020     IR THORACENTESIS  9/13/2019     IR TRANSCATHETER BIOPSY  1/8/2020     no prior surgery       REMOVE EXTRACORPORAL MEMBRANE OXYGENATOR ADULT N/A 3/3/2018    Procedure: REMOVE EXTRACORPORAL MEMBRANE OXYGENATOR ADULT;  Removal of Right Femoral Venous and Right Axillary Arterial Extracorporeal Membrane Oxygenator;  Surgeon: Toby Hernandez MD;  [facility]     TRANSPLANT LUNG RECIPIENT SINGLE X2 Bilateral 3/1/2018    Procedure: TRANSPLANT LUNG RECIPIENT SINGLE X2;  Median Sternotomy, Extracorporeal Membrane Oxygenator, bilateral sequential lung transplant;  Surgeon: Toby Hernandez MD;  Location[facility]      No Known Allergies   Social History     Tobacco Use     Smoking status: Never Smoker     Smokeless tobacco: Never Used   Substance Use Topics     Alcohol use: No     Alcohol/week: 1.0 standard drinks     Types: 1 Glasses of wine per week      Wt Readings from Last 1 Encounters:   04/29/21 52.2 kg (115 lb)        Anesthesia Evaluation   Pt has had prior anesthetic. Type: General.    History of anesthetic complications  - PONV.      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 c/b right mainstem bronchial stenosis s/p dilations    (+) allergic rhinitis (on flonase), asthma (on albuterol, budesonide)     Neurologic: Comment: Anisocoria (L>R) - neg neurologic ROS     Cardiovascular:     (+) hypertension-----dysrhythmias  (paroxysmal), a-fib, Irregular Heartbeat/Palpitations, pulmonary hypertension (IVC diameter >2.1 cm collapsing <50% with sniff suggests a high RA pressure), Previous cardiac testing   Echo: Date: 1/5/21 Results:  EF 60-65%, mild pHTN, no significant valvular pathologies  Stress Test: Date: Results:    ECG Reviewed: Date: Results:    Cath: Date: Results:      METS/Exercise Tolerance:     Hematologic:       Musculoskeletal: Comment: antisynthetase syndrome      GI/Hepatic: Comment: S/p PEG/J tube during hospitalization for ARDS 2/2021 - neg GI/hepatic ROS     Renal/Genitourinary:     (+) renal disease (Dialyzes T/Th/Sat), Pt requires dialysis, type: Hemodialysis,     Endo:     (+) Chronic steroid usage (tacrolimus + prednisone for rheumatologic disease) for     Psychiatric/Substance Use:  - neg psychiatric ROS     Infectious Disease: Comment: ARDS 2/2 PJP (1/2021)      Malignancy:  - neg malignancy ROS     Other:               OUTSIDE LABS:  CBC:   Lab Results   Component Value Date    WBC 6.3 04/29/2021    WBC 6.3 04/08/2021    HGB 11.1 (L) 04/29/2021    HGB 9.7 (L) 04/08/2021    HCT 34.5 (L) 04/29/2021    HCT 29.9 (L) 04/08/2021     04/29/2021     04/08/2021     BMP:   Lab Results   Component Value Date     04/08/2021     03/29/2021    POTASSIUM 3.7 04/08/2021    POTASSIUM 4.3 03/29/2021    CHLORIDE 101 04/08/2021    CHLORIDE 101 03/29/2021    CO2 32 04/08/2021    CO2 25 03/29/2021    BUN 22 04/08/2021    BUN 11.00 03/29/2021    BUN 49.5 03/29/2021    CR 2.49 (H) 04/08/2021    CR 4.50 03/29/2021     (H) 04/08/2021     (A) 03/29/2021     COAGS:   Lab Results   Component Value Date    PTT 28 02/12/2021    INR 0.99 04/29/2021    FIBR 358 02/11/2021     POC:   Lab Results   Component Value Date    BGM 85 04/09/2021    HCG Negative 06/06/2019     HEPATIC:   Lab Results   Component Value Date    ALBUMIN 3.5 03/29/2021    PROTTOTAL 6.8 03/01/2021    ALT 36 03/29/2021    AST 19  03/29/2021     (H) 11/11/2019    ALKPHOS 288 03/29/2021    BILITOTAL 0.9 03/29/2021    SALAS <10 (L) 01/24/2021     OTHER:   Lab Results   Component Value Date    PH 7.41 02/10/2021    LACT 0.5 (L) 02/09/2021    A1C 6.0 (H) 01/24/2021    CHELSEY 7.9 (L) 04/08/2021    PHOS 5.8 03/29/2021    MAG 1.6 04/29/2021    LIPASE 212 10/24/2019    AMYLASE 58 02/19/2018    TSH 1.80 08/01/2018    CRP 25.0 (H) 10/06/2019    SED 93 (H) 10/07/2019       Anesthesia Plan    ASA Status:  3      Anesthesia Type: General.   Induction: Intravenous, Propofol.   Maintenance: TIVA.        Consents    Anesthesia Plan(s) and associated risks, benefits, and realistic alternatives discussed. Questions answered and patient/representative(s) expressed understanding.     - Discussed with:  Patient         Postoperative Care       PONV prophylaxis: Dexamethasone or Solumedrol, Ondansetron (or other 5HT-3), Background Propofol Infusion     Comments:    Kecia is a 60-year-old female undergoing bronchoscopy with possible dilation, CO2 ablation, and stent placement by Dr. Norris. 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 History of PONV.            Louie Wheeler MD

## 2021-04-30 NOTE — CONSULTS
Nephrology Initial Consult  April 30, 2021      Kecia Blue MRN:[id number] YOB: 1960  Date of Admission:4/29/2021  Primary care provider: Rosy Vu  Requesting physician: Caryn Xie, *    ASSESSMENT AND RECOMMENDATIONS:   Kecai Blue is a 60 year old female with PMH of ILD s/p b/l lung transplant (2018) c/b R mainstem bronchial stenosis requiring dilations, Aspergillus empyema, and CMV viremia, HTN, hx postop DVT not currently on anticoagulation, hx pAfib, SVT, ESRD, admitted for scheduled left sided chest tube placement w/ IR due to persistent loculated pleural effusion.      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 Glasgow. Access: LUE AVG. Run time: 3.5 hrs. EDW: 51 kg  - Dialysis today per MWF schedule    Volume/BP: elevated 140-150's  - EDW 51 kg with usual OP UF 2 to 2.6 kg (may need to lower EDW)    BMD: Ca 8.6, no alb or phos; on paricalcitol 2 mcg per HD  - Continue as above    Anemia: hgb > 10 g/dL, no indication for TASH    S/p L chest tube 4/29, and bronch later today 4/30    Recommendations were communicated to primary team via this note      Adriana Jarvis, PA-C   [phone]      REASON FOR CONSULT: ESKD/dialysis    HISTORY OF PRESENT ILLNESS:  Kecia Blue is a 60 year old female with PMH of ILD s/p b/l lung transplant (2018) c/b R mainstem bronchial stenosis requiring dilations, Aspergillus empyema, and CMV viremia, HTN, hx postop DVT not currently on anticoagulation, hx pAfib, SVT, ESRD, admitted for scheduled left sided chest tube placement w/ IR due to persistent loculated pleural effusion. Plan is for bronchoscopy later today. Pt is seen bedside and on dialysis, feeling well. Denies n/v, CP, chills. Obtained and reviewed OP dialysis records. Will continue HD on MWF schedule      PAST MEDICAL HISTORY:  Reviewed with patient on 04/30/2021     Past  Medical History:   Diagnosis Date     Acute on chronic respiratory failure with hypoxia (H) 02/21/2018     Anisocoria      Antisynthetase syndrome (H) 2014     Anxiety      Aspergillus (H)      Aspergillus pneumonia (H) 11/20/2020     Benign essential hypertension      C. difficile colitis      Cytomegalovirus (CMV) viremia (H)      Dermatomyositis (H)      Dysplasia of cervix, low grade (ESTRADA 1)      EBV (Franklin-Barr virus) viremia      ESRD (end stage renal disease) on dialysis (H)      ILD (interstitial lung disease) (H)      Lung replaced by transplant (H)      Osteopenia      Paroxysmal atrial fibrillation (H)      Pneumocystis jiroveci pneumonia (H)      PONV (postoperative nausea and vomiting)      Post-menopause      Pulmonary hypertension (H)      Raynaud's disease      Seronegative rheumatoid arthritis (H)        Past Surgical History:   Procedure Laterality Date     BRONCHOSCOPY (RIGID OR FLEXIBLE), DIAGNOSTIC N/A 4/10/2018    Procedure: COMBINED BRONCHOSCOPY (RIGID OR FLEXIBLE), LAVAGE;;  Surgeon: Marpiosa Donohue MD;  [facility]     BRONCHOSCOPY (RIGID OR FLEXIBLE), DIAGNOSTIC N/A 12/23/2020    Procedure: BRONCHOSCOPY, WITH BRONCHOALVEOLAR LAVAGE;  Surgeon: Uri Bass MD;  [facility]     BRONCHOSCOPY (RIGID OR FLEXIBLE), DILATE BRONCHUS / TRACHEA N/A 10/11/2018    Procedure: BRONCHOSCOPY (RIGID OR FLEXIBLE), DILATE BRONCHUS / TRACHEA;  Flexible And Rigid Bronchoscopy and Dilation;  Surgeon: Wilber Lin MD;  [facility]     BRONCHOSCOPY FLEXIBLE N/A 3/13/2018    Procedure: BRONCHOSCOPY FLEXIBLE;  Flexible Bronchoscopy ;  Surgeon: Gissell Sanchez MD;  [facility]     BRONCHOSCOPY FLEXIBLE N/A 5/9/2018    Procedure: BRONCHOSCOPY FLEXIBLE;;  Surgeon: Wilber Lin MD;  [facility]     BRONCHOSCOPY FLEXIBLE AND RIGID N/A 9/10/2018    Procedure: BRONCHOSCOPY FLEXIBLE AND RIGID;  Flexible and Rigid Bronchoscopy with Balloon Dilation, tissue debulking  with cryo, and Right mainstem bronchus stent placement;  Surgeon: Wilber Lin MD;  [facility]     BRONCHOSCOPY RIGID N/A 6/6/2018    Procedure: BRONCHOSCOPY RIGID;;  Surgeon: Lopez Macias MD;  [facility]     BRONCHOSCOPY, DILATE BRONCHUS, STENT BRONCHUS, COMBINED N/A 6/11/2018    Procedure: COMBINED BRONCHOSCOPY, DILATE BRONCHUS, STENT BRONCHUS;  Flexible Bronchoscopy, Balloon Dilation, Bronchial Washings;  Surgeon: Wilber Lin MD;  [facility]     BRONCHOSCOPY, DILATE BRONCHUS, STENT BRONCHUS, COMBINED Right 7/10/2018    Procedure: COMBINED BRONCHOSCOPY, DILATE BRONCHUS, STENT BRONCHUS;  Flexible Bronchoscopy, Balloon Dilation, Bronchial Washings  ;  Surgeon: Wilber Lin MD;  [facility]     BRONCHOSCOPY, DILATE BRONCHUS, STENT BRONCHUS, COMBINED N/A 8/2/2018    Procedure: COMBINED BRONCHOSCOPY, DILATE BRONCHUS, STENT BRONCHUS;  Flexible Bronchoscopy, Bronchial Washings, Balloon Dilation;  Surgeon: Wilber Lin MD;  [facility]     BRONCHOSCOPY, DILATE BRONCHUS, STENT BRONCHUS, COMBINED N/A 8/20/2018    Procedure: COMBINED BRONCHOSCOPY, DILATE BRONCHUS, STENT BRONCHUS;  Flexible Bronchoscopy, Balloon Dilation;  Surgeon: Wilber Lin MD;  [facility]     BRONCHOSCOPY, DILATE BRONCHUS, STENT BRONCHUS, COMBINED N/A 10/29/2018    Procedure: Flexible Bronchoscopy, Balloon Dilation, Stent Revision, Airway Examination And Therapeutic Suctioning, Cyro Tumor Debulking;  Surgeon: Wilber Lin MD;  [facility]     BRONCHOSCOPY, DILATE BRONCHUS, STENT BRONCHUS, COMBINED N/A 11/20/2018    Procedure: Rigid Bronchoscopy, Stent Removal and dilitation;  Surgeon: Wilber Lin MD;  [facility]     BRONCHOSCOPY, DILATE BRONCHUS, STENT BRONCHUS, COMBINED N/A 12/14/2018    Procedure: Flexible And Rigid Bronchoscopy, Balloon Dilation, Bronchial Washing;  Surgeon: Wilber Lin MD;  [facility]      BRONCHOSCOPY, DILATE BRONCHUS, STENT BRONCHUS, COMBINED N/A 1/17/2019    Procedure: Flexible And Rigid Bronchoscopy, Balloon Dilation.;  Surgeon: Wilber Lin MD;  [facility]     BRONCHOSCOPY, DILATE BRONCHUS, STENT BRONCHUS, COMBINED N/A 3/7/2019    Procedure: Flexible and Rigid Bronchoscopy, Bronchial Washing, Balloon Dilation;  Surgeon: Wilber Lin MD;  [facility]     BRONCHOSCOPY, DILATE BRONCHUS, STENT BRONCHUS, COMBINED N/A 6/6/2019    Procedure: Rigid and Flexible Bronchoscopy, Balloon Dilation;  Surgeon: Wilber Lin MD;  [facility]     BRONCHOSCOPY, DILATE BRONCHUS, STENT BRONCHUS, COMBINED N/A 10/11/2019    Procedure: Flexible and Rigid Bronchoscopy, Balloon Dilation, Bronchoalveolar Lagave;  Surgeon: Wilber Lin MD;  [facility]     BRONCHOSCOPY, DILATE BRONCHUS, STENT BRONCHUS, COMBINED N/A 2/19/2021    Procedure: BRONCHOSCOPY, flexible, airway dilation, and bronchial wash;  Surgeon: Wilber Lin MD;  [facility]     BRONCHOSCOPY, DILATE BRONCHUS, STENT BRONCHUS, COMBINED N/A 4/9/2021    Procedure: BRONCHOSCOPY, flexible and rigid, Airway suctioning;  Surgeon: Mati Norris MD;  [facility]     CV RIGHT HEART CATH MEASUREMENTS RECORDED N/A 3/10/2020    Procedure: CV RIGHT HEART CATH;  Surgeon: Wai Garcia MD;  [facility]     ENT SURGERY      tonsillectomy as a child     ESOPHAGOSCOPY, GASTROSCOPY, DUODENOSCOPY (EGD), COMBINED N/A 10/29/2018    Procedure: COMBINED ESOPHAGOSCOPY, GASTROSCOPY, DUODENOSCOPY (EGD) with biopsies and polypectomy;  Surgeon: Chente Bloom MD;  [facility]     INSERT EXTRACORPORAL MEMBRANE OXYGENATOR ADULT (OUTSIDE OR) N/A 2/27/2018    Procedure: INSERT EXTRACORPORAL MEMBRANE OXYGENATOR ADULT (OUTSIDE OR);  INSERT EXTRACORPORAL MEMBRANE OXYGENATOR ADULT (OUTSIDE OR) ;  Surgeon: Toby Hernandez MD;  [facility]     IR CVC TUNNEL PLACEMENT  > 5 YRS OF AGE  10/25/2019     IR DIALYSIS FISTULOGRAM LEFT  3/2/2021     IR DIALYSIS MECH THROMB, PTA  3/2/2021     IR GASTRO JEJUNOSTOMY TUBE PLACEMENT  2/16/2021     IR PARACENTESIS  1/8/2020     IR THORACENTESIS  9/13/2019     IR TRANSCATHETER BIOPSY  1/8/2020     no prior surgery       REMOVE EXTRACORPORAL MEMBRANE OXYGENATOR ADULT N/A 3/3/2018    Procedure: REMOVE EXTRACORPORAL MEMBRANE OXYGENATOR ADULT;  Removal of Right Femoral Venous and Right Axillary Arterial Extracorporeal Membrane Oxygenator;  Surgeon: Toby Hernandez MD;  [facility]     TRANSPLANT LUNG RECIPIENT SINGLE X2 Bilateral 3/1/2018    Procedure: TRANSPLANT LUNG RECIPIENT SINGLE X2;  Median Sternotomy, Extracorporeal Membrane Oxygenator, bilateral sequential lung transplant;  Surgeon: Toby Hernandez MD;  [facility]        MEDICATIONS:  PTA Meds  Prior to Admission medications    Medication Sig Last Dose Taking? Auth Provider   acetaminophen (TYLENOL) 325 MG tablet Take 1 tablet (325 mg) by mouth every 4 hours as needed for mild pain or fever Past Week at Unknown time Yes Leelee Huang MD   albuterol (PROVENTIL) (2.5 MG/3ML) 0.083% neb solution Take 1 vial (2.5 mg) by nebulization 2 times daily 4/28/2021 at PM Yes Yenni Graham MD   budesonide (PULMICORT) 0.5 MG/2ML neb solution Take 2 mLs (0.5 mg) by nebulization 2 times daily 4/28/2021 at PM Yes Yenni Graham MD   fluticasone (FLONASE) 50 MCG/ACT nasal spray Spray 1 spray into both nostrils daily Past Week at Unknown time Yes Ame Chow PA-C   Magnesium Cl-Calcium Carbonate (SLOW-MAG) 71.5-119 MG TBEC Take 3 tablets by mouth four times a week Take on non dialysis days T/Th/Sa/Su 4/29/2021 at 0900 Yes Yenni Graham MD   metoprolol tartrate (LOPRESSOR) 25 MG tablet 1 tablet (25 mg) by Oral or Feeding Tube route 2 times daily 4/29/2021 at 0900 Yes Yenni Graham MD   multivitamin RENAL (RENAVITE RX/NEPHROVITE) 1 MG tablet Take 1 tablet  by mouth daily 4/28/2021 at PM Yes Ame Chow PA-C   pantoprazole (PROTONIX) 40 MG EC tablet Take 1 tablet (40 mg) by mouth every morning (before breakfast) 4/29/2021 at 0900 Yes Lissett Rodriguez PA-C   posaconazole (NOXAFIL) 100 MG EC tablet Take 3 tablets (300 mg) by mouth daily 4/29/2021 at 0900 Yes Lissett Rodriguez PA-C   predniSONE (DELTASONE) 2.5 MG tablet Take 1 tablet (2.5 mg) by mouth every evening 4/28/2021 at PM Yes Ame Chow PA-C   predniSONE (DELTASONE) 5 MG tablet Take 1 tablet (5 mg) by mouth every morning 4/29/2021 at 0900 Yes Ame Chow PA-C   sulfamethoxazole-trimethoprim (BACTRIM) 400-80 MG tablet Take 1 tablet by mouth Every Mon, Wed, Fri Morning 4/28/2021 at Unknown time Yes Ame Chow PA-C   tacrolimus (GENERIC EQUIVALENT) 0.5 MG capsule Take 1 capsule (0.5 mg) by mouth every morning Total dose: 0.5 mg in the AM and 1 mg in the PM 4/29/2021 at 0900 Yes Ame Chow PA-C   tacrolimus (GENERIC EQUIVALENT) 1 MG capsule Take 1 capsule (1 mg) by mouth every evening Total dose: 0.5 mg in the AM and 1 mg in the PM 4/28/2021 at Unknown time Yes Ame Chow PA-C   blood glucose (NO BRAND SPECIFIED) test strip Use to test blood sugar 4 times daily or as directed. Unknown at Unknown time  Lissett Rodriguez PA-C   blood glucose (NO BRAND SPECIFIED) test strip Use to test blood sugar 4 times daily or as directed. Unknown at Unknown time  Lissett Rodriguez PA-C   ondansetron (ZOFRAN-ODT) 4 MG ODT tab Take 1 tablet (4 mg) by mouth every 6 hours as needed for nausea or vomiting More than a month at Unknown time  Lissett Rodriguez PA-C      Current Meds    albuterol  2.5 mg Nebulization BID     budesonide  0.5 mg Nebulization BID     fluticasone  1 spray Both Nostrils Daily     magnesium chloride  1,605 mg Oral Once per day on Sun Tue Thu Sat     metoprolol tartrate  25 mg Oral or Feeding Tube BID     multivitamin RENAL  1 tablet  Oral Daily with supper     pantoprazole  40 mg Oral QAM AC     posaconazole  300 mg Oral Daily     predniSONE  2.5 mg Oral QPM     predniSONE  5 mg Oral QAM     sodium chloride (PF)  3 mL Intracatheter Q8H     sulfamethoxazole-trimethoprim  1 tablet Oral Q Mon Wed Fri AM     tacrolimus  0.5 mg Oral QAM     tacrolimus  1 mg Oral QPM     Infusion Meds    no pre procedure antibiotic needed         ALLERGIES:    No Known Allergies    REVIEW OF SYSTEMS:  A comprehensive of systems was negative except as noted above.    SOCIAL HISTORY:   Social History     Socioeconomic History     Marital status:      Spouse name: Not on file     Number of children: Not on file     Years of education: Not on file     Highest education level: Not on file   Occupational History     Not on file   Social Needs     Financial resource strain: Not on file     Food insecurity     Worry: Not on file     Inability: Not on file     Transportation needs     Medical: Not on file     Non-medical: Not on file   Tobacco Use     Smoking status: Never Smoker     Smokeless tobacco: Never Used   Substance and Sexual Activity     Alcohol use: No     Alcohol/week: 1.0 standard drinks     Types: 1 Glasses of wine per week     Drug use: No     Sexual activity: Not on file   Lifestyle     Physical activity     Days per week: Not on file     Minutes per session: Not on file     Stress: Not on file   Relationships     Social connections     Talks on phone: Not on file     Gets together: Not on file     Attends Denominational service: Not on file     Active member of club or organization: Not on file     Attends meetings of clubs or organizations: Not on file     Relationship status: Not on file     Intimate partner violence     Fear of current or ex partner: Not on file     Emotionally abused: Not on file     Physically abused: Not on file     Forced sexual activity: Not on file   Other Topics Concern     Parent/sibling w/ CABG, MI or angioplasty before 65F 55M?  "No   Social History Narrative    3/6/2019 - Lives with . Has three daughters. Four grandchildren (two ). No pets. Travelled previously to [place], [place]. Has visited [place] several times.      Reviewed with patient     FAMILY MEDICAL HISTORY:   Family History   Problem Relation Age of Onset     Hypertension Mother      Arthritis Mother      Pancreatic Cancer Father      Diabetes Father      Reviewed with patient     PHYSICAL EXAM:   Temp  Av  F (36.7  C)  Min: 97.5  F (36.4  C)  Max: 98.7  F (37.1  C)      Pulse  Av.6  Min: 63  Max: 104 Resp  Av.1  Min: 12  Max: 30  SpO2  Av.7 %  Min: 90 %  Max: 100 %       BP (!) 151/94 (BP Location: Right arm)   Pulse 77   Temp 97.7  F (36.5  C) (Oral)   Resp 16   Ht 1.6 m (5' 3\")   Wt 51 kg (112 lb 6.4 oz)   LMP 2014 (Exact Date)   SpO2 100%   BMI 19.91 kg/m        Admit Weight: 52.2 kg (115 lb)     GENERAL APPEARANCE: alert, NAD  EYES: no scleral icterus, pupils equal  Pulmonary: diminished, L chest tube  CV: regular rhythm, normal rate    - Edema no peripheral  GI: soft, nontender, normal bowel sounds  MS: no evidence of inflammation in joints, no muscle tenderness  : no rivas  SKIN: no rash, warm, dry, no cyanosis  NEURO: face symmetric, a/o3  Access: LUE AVG    LABS:   CMP  Recent Labs   Lab 21  0606 21  1833     --    POTASSIUM 4.1  --    CHLORIDE 101  --    CO2 27  --    ANIONGAP 8  --    *  --    BUN 45*  --    CR 3.40*  --    GFRESTIMATED 14*  --    GFRESTBLACK 16*  --    CHELSEY 8.6  --    MAG 1.7 1.6     CBC  Recent Labs   Lab 21  0606 21  1020   HGB 10.9* 11.1*   WBC 6.8 6.3   RBC 3.41* 3.45*   HCT 34.4* 34.5*   * 100   MCH 32.0 32.2   MCHC 31.7 32.2   RDW 13.9 13.9    218     INR  Recent Labs   Lab 21  1020   INR 0.99     ABGNo lab results found in last 7 days.   URINE STUDIES  Recent Labs   Lab Test 21  1729 10/21/19  2240 19  0125 19  1512 "   COLOR Yellow Yellow Light Yellow Yellow   APPEARANCE Slightly Cloudy Cloudy Clear Clear   URINEGLC Negative Negative Negative Negative   URINEBILI Negative Negative Negative Negative   URINEKETONE 5* Negative 10* Negative   SG 1.023 1.018 1.008 1.016   UBLD Small* Trace* Negative Negative   URINEPH 5.0 5.0 7.5* 7.0   PROTEIN 30* 30* 30* 100*   NITRITE Negative Negative Negative Negative   LEUKEST Moderate* Large* Negative Trace*   RBCU 26* 25* <1 10*   WBCU 34* 115* 3 19*     Recent Labs   Lab Test 08/07/19  1512   [facility] 2.47*     PTH  No lab results found.  IRON STUDIES  Recent Labs   Lab Test 02/03/21  0415 12/13/18  1033 08/01/18  0921 05/08/18  0709   IRON 51 16* 93 48   * 221* 248 275   IRONSAT 36 7* 37 18   YOLA  --  302* 571*  --        IMAGING:  Reviewed    JONI Coronado, Chey Yeung, saw and evaluated this patient as part of a shared visit.  I have reviewed and discussed with the advanced practice provider their history, physical and plan.    I personally reviewed the vital signs, medications, labs and imaging.    My key history or physical exam findings:  ESRD, dialysis today  Key management decisions made by me:  Dialysis today for volume and solute control    Chey Yeung  Date of Service (when I saw the patient): 4/30

## 2021-04-30 NOTE — PROGRESS NOTES
Pt arrived on unit from IR at 1600 from having CT placed.  Vitals stable. RA Sats 98% A & O X 4.  2 RN skin assessment completed. Telem : NSR rates 70-90 with PVC. CT -20cm suction. No air leak. Pt up Independent.  LS course TO.  Dialysis fistula left arm +bruit+thrill. IV Saline locked. Regular diet. MRSA isolation. Pt NPO after midnight for AM Bronch on 4/30. Makes needs known. Will continue to monitor.

## 2021-04-30 NOTE — CONSULTS
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 Recent admission - (discharged from ARU 3/4/21) with PJP pneumonia, ARDS, and Septic shock. Admitted 21 for thoracentesis and chest tube placement to loculated left pleural effusion with possible future pleurodesis, and bronchoscopy with possible dilation/stent/tissue debulking on  with Dr. Norris.    Aspergillus empyema (left-sided): Noted 10/8/19. CT scan on 20 with LLL increased pleural-based mass-like density, s/p needle aspiration (20) with Aspergillus fumigatus on cultures, s/p intrapleural amphotericin bead placement (). Negative cultures 2020 and again on 21. Pleural effusion has persisted with monitoring clinically off ABX and follows closely as OP with ID. Most recent BDG fungitell () positive at 311. Most recent ID visit () notes high concern for high morbidity with surgery and therefore planned continue to manage medically with long term posaconazole. Effusion persists, s/p planned left pleural chest tube placement and thoracentesis (), with possible future pleurodesis. Remains afebrile, no leukocytosis.  - Bacterial pleural cultures () pending  - Pleural cultures (fungal, AFB), and fluid studies ordered  - PTA Posaconazole 300 mg TID   - A. galactomannan Ag and BDG fungitell, ordered  - Defer  antibiotics and ID consult for now pending cultures, low threshold if decompensates  - Left pleural chest tube to 20 cm wall suction    Right main bronchial stenosis s/p dilatation: Follow with Dr. Norris for serial bronchoscopies with dilation. Last bronch 2/19/21.  - Plan for bronchoscopy in OR with Dr. Norris 4/30 with possible dilation/stent/tissue debulking   - Airway clearance with IS, Aerobika, and Nebs: Mucomyst, Albuterol, and Pulmicort BID    S/p bilateral lung transplant for ILD 2/2 CTD:  Recent admission, as above, with Stenotrophomonas, Eikenella, and PJP pneumonia. Last seen in pulmonary clinic 4/8/21 (virtual). PFTs at that time improved from prior, still below post transplant best.  Most recent DSA (1/25) and CMV (4/22) not detected.  - CXR (4/30) with Streaky perihilar and bibasilar opacities, likely atelectasis and edema (personally reviewed)  - DSA ordered    Immunosuppression: On 2 drug IST d/t recurrent infections  - Tacrolimus level (4/30) supratherapeutic at 15.3 (10 hr). Goal level 8-10. Will reduce dose from 0.5 mg qAM/ 1 mg qPM to 0.5 mg BID. Repeat level 5/3 (ordered)  - Prednisone 5 mg qAM / 2.5 mg qPM     Prophylaxis:   - Bactrim MWF PPx    EBV viremia:  Level 12/9/20 mildly elevated to 10K (log 4) from prior 3K (3.5 log) on 9/9/20, repeat (1/25/21) decreased to 5619 copies (log of 3.8). Most recent level 2/22 elevated to 75K/log 4.9, thought to be secondary to acute illness and recent steroid burst. CT chest (w/o contrast), with no thoracic adenopathy.   - Repeat EBV ordered    Other relevent problems managed by primary team:     CKD stage IV: Creatinine stable on admission at 3.4. Recent baseline 2.4-3.4 and has worsened since 2/2021. Dialysis MWF.  - Will continue to monitor tacrolimus levels  - Dialysis per nephrology    We appreciate the excellent care provided by the [place] [id number] team.  Recommendations communicated via in person rounding and this note.  Will continue to follow along  closely, please do not hesitate to call with any questions or concerns.    Patient discussed with Dr. Da Ibarra, APRN, CNP   Inpatient Nurse Practitioner  Pulmonary CF/Transplant     Chief Complaint:     Planned thoracentesis and chest tube placement to loculated left pleural effusion with possible future pleurodesis, and bronchoscopy with possible dilation/stent/tissue debulking on 4/30 with Dr. Norris.    History of Present Illness:     History obtained from patient and chart review.    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 Recent admission 2/25-3/4 with Stenotrophomonas/Eikenella/PJP pneumonia, ARDS, and Septic shock. Admitted 4/29/21 for thoracentesis and chest tube placement to loculated left pleural effusion with possible future pleurodesis, and bronchoscopy with possible dilation/stent/tissue debulking on 4/30 with Dr. Norris.    Has felt relatively well since discharging home from rehab about 2 months ago. Has been doing pulm rehab and PT twice weekly. She denies any dyspnea or chest pain/pressure. Occasional wheezes. Good energy and has been exercising regularly in the gym and walks. Last seen in pulmonary clinic 4/8/21 (virtual). PFTs at that time improved from prior, still below post transplant best.  Most recent DSA (1/25) and CMV (4/22) not detected. Most recent EBV 2/22 elevated to 75K/log 4.9, thought to be secondary to acute illness and recent steroid burst. CT chest (w/o contrast), with no thoracic adenopathy. No reports of sweats. She denies any nausea/vomiting/acid reflux, has usually 2 loose stools a day which is her baseline. Oliguric, and notes that urine production is increasing. No edema or lesions. Mood is pleasant. Received  2nd covid vaccine 1 month ago.    Review of Systems:     10-point ROS negative except noted in HPI    Medical and Surgical History:     Past Medical History:   Diagnosis Date     Acute on chronic respiratory failure with hypoxia (H) 02/21/2018     Anisocoria      Antisynthetase syndrome (H) 2014     Anxiety      Aspergillus (H)      Aspergillus pneumonia (H) 11/20/2020     Benign essential hypertension      C. difficile colitis      Cytomegalovirus (CMV) viremia (H)      Dermatomyositis (H)      Dysplasia of cervix, low grade (ESTRADA 1)      EBV (Franklin-Barr virus) viremia      ESRD (end stage renal disease) on dialysis (H)      ILD (interstitial lung disease) (H)      Lung replaced by transplant (H)      Osteopenia      Paroxysmal atrial fibrillation (H)      Pneumocystis jiroveci pneumonia (H)      PONV (postoperative nausea and vomiting)      Post-menopause      Pulmonary hypertension (H)      Raynaud's disease      Seronegative rheumatoid arthritis (H)      Past Surgical History:   Procedure Laterality Date     BRONCHOSCOPY (RIGID OR FLEXIBLE), DIAGNOSTIC N/A 4/10/2018    Procedure: COMBINED BRONCHOSCOPY (RIGID OR FLEXIBLE), LAVAGE;;  Surgeon: Mariposa Donohue MD;  Location:  GI     BRONCHOSCOPY (RIGID OR FLEXIBLE), DIAGNOSTIC N/A 12/23/2020    Procedure: BRONCHOSCOPY, WITH BRONCHOALVEOLAR LAVAGE;  Surgeon: Uri Bass MD;  [facility]     BRONCHOSCOPY (RIGID OR FLEXIBLE), DILATE BRONCHUS / TRACHEA N/A 10/11/2018    Procedure: BRONCHOSCOPY (RIGID OR FLEXIBLE), DILATE BRONCHUS / TRACHEA;  Flexible And Rigid Bronchoscopy and Dilation;  Surgeon: Wilber Lin MD;  [facility]     BRONCHOSCOPY FLEXIBLE N/A 3/13/2018    Procedure: BRONCHOSCOPY FLEXIBLE;  Flexible Bronchoscopy ;  Surgeon: Gissell Sanchez MD;  [facility]     BRONCHOSCOPY FLEXIBLE N/A 5/9/2018    Procedure: BRONCHOSCOPY FLEXIBLE;;  Surgeon: Wilber Lin MD;  Location:  GI     BRONCHOSCOPY FLEXIBLE AND  RIGID N/A 9/10/2018    Procedure: BRONCHOSCOPY FLEXIBLE AND RIGID;  Flexible and Rigid Bronchoscopy with Balloon Dilation, tissue debulking with cryo, and Right mainstem bronchus stent placement;  Surgeon: Wilber Lin MD;  [facility]     BRONCHOSCOPY RIGID N/A 6/6/2018    Procedure: BRONCHOSCOPY RIGID;;  Surgeon: Lopez Macias MD;  [facility]     BRONCHOSCOPY, DILATE BRONCHUS, STENT BRONCHUS, COMBINED N/A 6/11/2018    Procedure: COMBINED BRONCHOSCOPY, DILATE BRONCHUS, STENT BRONCHUS;  Flexible Bronchoscopy, Balloon Dilation, Bronchial Washings;  Surgeon: Wilber Lin MD;  [facility]     BRONCHOSCOPY, DILATE BRONCHUS, STENT BRONCHUS, COMBINED Right 7/10/2018    Procedure: COMBINED BRONCHOSCOPY, DILATE BRONCHUS, STENT BRONCHUS;  Flexible Bronchoscopy, Balloon Dilation, Bronchial Washings  ;  Surgeon: Wilber Lin MD;  [facility]     BRONCHOSCOPY, DILATE BRONCHUS, STENT BRONCHUS, COMBINED N/A 8/2/2018    Procedure: COMBINED BRONCHOSCOPY, DILATE BRONCHUS, STENT BRONCHUS;  Flexible Bronchoscopy, Bronchial Washings, Balloon Dilation;  Surgeon: Wilber Lin MD;  [facility]     BRONCHOSCOPY, DILATE BRONCHUS, STENT BRONCHUS, COMBINED N/A 8/20/2018    Procedure: COMBINED BRONCHOSCOPY, DILATE BRONCHUS, STENT BRONCHUS;  Flexible Bronchoscopy, Balloon Dilation;  Surgeon: Wilber Lin MD;  [facility]     BRONCHOSCOPY, DILATE BRONCHUS, STENT BRONCHUS, COMBINED N/A 10/29/2018    Procedure: Flexible Bronchoscopy, Balloon Dilation, Stent Revision, Airway Examination And Therapeutic Suctioning, Cyro Tumor Debulking;  Surgeon: Wilber Lin MD;  [facility]     BRONCHOSCOPY, DILATE BRONCHUS, STENT BRONCHUS, COMBINED N/A 11/20/2018    Procedure: Rigid Bronchoscopy, Stent Removal and dilitation;  Surgeon: Wilber Lin MD;  [facility]     BRONCHOSCOPY, DILATE BRONCHUS, STENT BRONCHUS, COMBINED N/A 12/14/2018    Procedure:  Flexible And Rigid Bronchoscopy, Balloon Dilation, Bronchial Washing;  Surgeon: Wilber Lni MD;  [facility]     BRONCHOSCOPY, DILATE BRONCHUS, STENT BRONCHUS, COMBINED N/A 1/17/2019    Procedure: Flexible And Rigid Bronchoscopy, Balloon Dilation.;  Surgeon: Wilber Lin MD;  [facility]     BRONCHOSCOPY, DILATE BRONCHUS, STENT BRONCHUS, COMBINED N/A 3/7/2019    Procedure: Flexible and Rigid Bronchoscopy, Bronchial Washing, Balloon Dilation;  Surgeon: Wilber Lin MD;  [facility]     BRONCHOSCOPY, DILATE BRONCHUS, STENT BRONCHUS, COMBINED N/A 6/6/2019    Procedure: Rigid and Flexible Bronchoscopy, Balloon Dilation;  Surgeon: Wilber Lin MD;  [facility]     BRONCHOSCOPY, DILATE BRONCHUS, STENT BRONCHUS, COMBINED N/A 10/11/2019    Procedure: Flexible and Rigid Bronchoscopy, Balloon Dilation, Bronchoalveolar Lagave;  Surgeon: Wilber iLn MD;  [facility]     BRONCHOSCOPY, DILATE BRONCHUS, STENT BRONCHUS, COMBINED N/A 2/19/2021    Procedure: BRONCHOSCOPY, flexible, airway dilation, and bronchial wash;  Surgeon: Wilber Lin MD;  [facility]     BRONCHOSCOPY, DILATE BRONCHUS, STENT BRONCHUS, COMBINED N/A 4/9/2021    Procedure: BRONCHOSCOPY, flexible and rigid, Airway suctioning;  Surgeon: Mati Norris MD;  [facility]     CV RIGHT HEART CATH MEASUREMENTS RECORDED N/A 3/10/2020    Procedure: CV RIGHT HEART CATH;  Surgeon: Wai Garcia MD;  Location:  HEART CARDIAC CATH LAB     ENT SURGERY      tonsillectomy as a child     ESOPHAGOSCOPY, GASTROSCOPY, DUODENOSCOPY (EGD), COMBINED N/A 10/29/2018    Procedure: COMBINED ESOPHAGOSCOPY, GASTROSCOPY, DUODENOSCOPY (EGD) with biopsies and polypectomy;  Surgeon: Chente Bloom MD;  [facility]     INSERT EXTRACORPORAL MEMBRANE OXYGENATOR ADULT (OUTSIDE OR) N/A 2/27/2018    Procedure: INSERT EXTRACORPORAL MEMBRANE OXYGENATOR ADULT (OUTSIDE OR);  INSERT  EXTRACORPORAL MEMBRANE OXYGENATOR ADULT (OUTSIDE OR) ;  Surgeon: Toby Hernandez MD;  [facility]     IR CVC TUNNEL PLACEMENT > 5 YRS OF AGE  10/25/2019     IR DIALYSIS FISTULOGRAM LEFT  3/2/2021     IR DIALYSIS MECH THROMB, PTA  3/2/2021     IR GASTRO JEJUNOSTOMY TUBE PLACEMENT  2/16/2021     IR PARACENTESIS  1/8/2020     IR THORACENTESIS  9/13/2019     IR TRANSCATHETER BIOPSY  1/8/2020     no prior surgery       REMOVE EXTRACORPORAL MEMBRANE OXYGENATOR ADULT N/A 3/3/2018    Procedure: REMOVE EXTRACORPORAL MEMBRANE OXYGENATOR ADULT;  Removal of Right Femoral Venous and Right Axillary Arterial Extracorporeal Membrane Oxygenator;  Surgeon: Toby Hernandez MD;  [facility]     TRANSPLANT LUNG RECIPIENT SINGLE X2 Bilateral 3/1/2018    Procedure: TRANSPLANT LUNG RECIPIENT SINGLE X2;  Median Sternotomy, Extracorporeal Membrane Oxygenator, bilateral sequential lung transplant;  Surgeon: Toby Hernandez MD;  [facility]     Social and Family History:     Social History     Socioeconomic History     Marital status:      Spouse name: Not on file     Number of children: Not on file     Years of education: Not on file     Highest education level: Not on file   Occupational History     Not on file   Social Needs     Financial resource strain: Not on file     Food insecurity     Worry: Not on file     Inability: Not on file     Transportation needs     Medical: Not on file     Non-medical: Not on file   Tobacco Use     Smoking status: Never Smoker     Smokeless tobacco: Never Used   Substance and Sexual Activity     Alcohol use: No     Alcohol/week: 1.0 standard drinks     Types: 1 Glasses of wine per week     Drug use: No     Sexual activity: Not on file   Lifestyle     Physical activity     Days per week: Not on file     Minutes per session: Not on file     Stress: Not on file   Relationships     Social connections     Talks on phone: Not on file     Gets together: Not on  file     Attends [employer] service: Not on file     Active member of club or organization: Not on file     Attends meetings of clubs or organizations: Not on file     Relationship status: Not on file     Intimate partner violence     Fear of current or ex partner: Not on file     Emotionally abused: Not on file     Physically abused: Not on file     Forced sexual activity: Not on file   Other Topics Concern     Parent/sibling w/ CABG, MI or angioplasty before 65F 55M? No   Social History Narrative    3/6/2019 - Lives with . Has three daughters. Four grandchildren (two ). No pets. Travelled previously to [facility]. Has visited [place] several times.      Family History   Problem Relation Age of Onset     Hypertension Mother      Arthritis Mother      Pancreatic Cancer Father      Diabetes Father      Allergies and Home Medications:   No Known Allergies  Medications Prior to Admission   Medication Sig Dispense Refill Last Dose     acetaminophen (TYLENOL) 325 MG tablet Take 1 tablet (325 mg) by mouth every 4 hours as needed for mild pain or fever 60 tablet  Past Week at Unknown time     albuterol (PROVENTIL) (2.5 MG/3ML) 0.083% neb solution Take 1 vial (2.5 mg) by nebulization 2 times daily 360 mL 0 2021 at PM     budesonide (PULMICORT) 0.5 MG/2ML neb solution Take 2 mLs (0.5 mg) by nebulization 2 times daily 2 mL 3 2021 at PM     fluticasone (FLONASE) 50 MCG/ACT nasal spray Spray 1 spray into both nostrils daily 15.8 mL 0 Past Week at Unknown time     Magnesium Cl-Calcium Carbonate (SLOW-MAG) 71.5-119 MG TBEC Take 3 tablets by mouth four times a week Take on non dialysis days // 90 tablet 3 2021 at 0900     metoprolol tartrate (LOPRESSOR) 25 MG tablet 1 tablet (25 mg) by Oral or Feeding Tube route 2 times daily 60 tablet 0 2021 at 0900     multivitamin RENAL (RENAVITE RX/NEPHROVITE) 1 MG tablet Take 1 tablet by mouth daily 30 tablet 11 2021 at PM     pantoprazole  (PROTONIX) 40 MG EC tablet Take 1 tablet (40 mg) by mouth every morning (before breakfast) 30 tablet 0 4/29/2021 at 0900     posaconazole (NOXAFIL) 100 MG EC tablet Take 3 tablets (300 mg) by mouth daily 270 tablet 0 4/29/2021 at 0900     predniSONE (DELTASONE) 2.5 MG tablet Take 1 tablet (2.5 mg) by mouth every evening 30 tablet 11 4/28/2021 at PM     predniSONE (DELTASONE) 5 MG tablet Take 1 tablet (5 mg) by mouth every morning 30 tablet 11 4/29/2021 at 0900     sulfamethoxazole-trimethoprim (BACTRIM) 400-80 MG tablet Take 1 tablet by mouth Every Mon, Wed, Fri Morning 13 tablet 11 4/28/2021 at Unknown time     tacrolimus (GENERIC EQUIVALENT) 0.5 MG capsule Take 1 capsule (0.5 mg) by mouth every morning Total dose: 0.5 mg in the AM and 1 mg in the PM 30 capsule 11 4/29/2021 at 0900     tacrolimus (GENERIC EQUIVALENT) 1 MG capsule Take 1 capsule (1 mg) by mouth every evening Total dose: 0.5 mg in the AM and 1 mg in the PM 30 capsule 11 4/28/2021 at Unknown time     blood glucose (NO BRAND SPECIFIED) test strip Use to test blood sugar 4 times daily or as directed. 120 strip 0 Unknown at Unknown time     blood glucose (NO BRAND SPECIFIED) test strip Use to test blood sugar 4 times daily or as directed. 120 strip 0 Unknown at Unknown time     ondansetron (ZOFRAN-ODT) 4 MG ODT tab Take 1 tablet (4 mg) by mouth every 6 hours as needed for nausea or vomiting 12 tablet 0 More than a month at Unknown time     Current Scheduled Meds    sodium chloride 0.9%  250 mL Intravenous Once in dialysis     sodium chloride 0.9%  300 mL Hemodialysis Machine Once     albuterol  2.5 mg Nebulization BID     budesonide  0.5 mg Nebulization BID     fluticasone  1 spray Both Nostrils Daily     gelatin absorbable  1 each Topical During Hemodialysis (from stock)     magnesium chloride  1,605 mg Oral Once per day on Sun Tue Thu Sat     metoprolol tartrate  25 mg Oral or Feeding Tube BID     multivitamin RENAL  1 tablet Oral Daily with supper      "- MEDICATION INSTRUCTIONS -   Does not apply Once     pantoprazole  40 mg Oral QAM AC     paricalcitol  2 mcg Intravenous Once in dialysis     posaconazole  300 mg Oral Daily     predniSONE  2.5 mg Oral QPM     predniSONE  5 mg Oral QAM     sodium chloride (PF)  3 mL Intracatheter Q8H     sulfamethoxazole-trimethoprim  1 tablet Oral Q Mon Wed Fri AM     tacrolimus  0.5 mg Oral QAM     tacrolimus  1 mg Oral QPM      Current PRN Meds  sodium chloride 0.9%, acetaminophen, lidocaine 4%, lidocaine (buffered or not buffered), lidocaine (buffered or not buffered), lidocaine (buffered or not buffered), melatonin, ondansetron **OR** ondansetron, polyethylene glycol, sodium chloride (PF), - MEDICATION INSTRUCTIONS -     Physical Exam:     Vital signs:  Temp: 98  F (36.7  C) Temp src: Oral BP: (!) 154/88 Pulse: 91   Resp: 23 SpO2: 99 % O2 Device: None (Room air) Oxygen Delivery: 2 LPM Height: 160 cm (5' 3\") Weight: 51 kg (112 lb 7 oz)  I/O:     Intake/Output Summary (Last 24 hours) at 4/30/2021 1443  Last data filed at 4/30/2021 0400  Gross per 24 hour   Intake 600 ml   Output 21 ml   Net 579 ml     Constitutional: Sitting in bed, in no apparent distress.   HEENT: Eyes with pink conjunctivae, anicteric.  Oral mucosa moist without lesions.  Neck supple without lymphadenopathy.   PULM: Diminished airflow to left base. Fine crackles upper lobes bilaterally, no rhonchi, no wheezes.  Non-labored breathing on RA.  CV: Normal S1 and S2.  RRR.  No murmur, gallop, or rub.  No peripheral edema. Left pleural chest tube.  ABD: NABS, soft, nontender, nondistended.    MSK: Moves all extremities.  No apparent muscle wasting.   NEURO: Alert and oriented, conversant.   SKIN: Warm, dry.  No rash on limited exam.   PSYCH: Mood stable.      Lines, Drains, and Devices:  Peripheral IV 04/29/21 Anterior;Right (Active)   Site Assessment WDL 04/30/21 0000   Line Status Saline locked 04/30/21 0000   Phlebitis Scale 0-->no symptoms 04/30/21 0000 "   Infiltration Scale 0 04/30/21 0000   Infiltration Site Treatment Method  None 04/30/21 0000   Number of days: 1       Hemodialysis Vascular Access Arteriovenous fistula Left Arm (Active)   Site Assessment WDL 04/30/21 1421   Cannulation Needle Size 15 04/30/21 1128   Dressing Intervention New dressing 04/30/21 1421   Dressing Status Not applicable 04/09/21 0945   Number of days: 95     Results:     LABS    CMP:   Recent Labs   Lab 04/30/21  0606 04/29/21  1833     --    POTASSIUM 4.1  --    CHLORIDE 101  --    CO2 27  --    ANIONGAP 8  --    *  --    BUN 45*  --    CR 3.40*  --    GFRESTIMATED 14*  --    GFRESTBLACK 16*  --    CHELSEY 8.6  --    MAG 1.7 1.6     CBC:   Recent Labs   Lab 04/30/21  0606 04/29/21  1020   WBC 6.8 6.3   RBC 3.41* 3.45*   HGB 10.9* 11.1*   HCT 34.4* 34.5*   * 100   MCH 32.0 32.2   MCHC 31.7 32.2   RDW 13.9 13.9    218       INR:   Recent Labs   Lab 04/29/21  1020   INR 0.99       Glucose:   Recent Labs   Lab 04/30/21  0606   *       Blood Gas: No lab results found in last 7 days.    Culture Data   Recent Labs   Lab 04/29/21  1315   CULT Culture negative monitoring continues  Culture negative monitoring continues       Virology Data:   Lab Results   Component Value Date    FLUAH1 Not Detected 01/24/2021    FLUAH3 Not Detected 01/24/2021    IE4390 Not Detected 01/24/2021    IFLUB Not Detected 01/24/2021    RSVA Not Detected 01/24/2021    RSVB Not Detected 01/24/2021    PIV1 Not Detected 01/24/2021    PIV2 Not Detected 01/24/2021    PIV3 Not Detected 01/24/2021    HMPV Not Detected 01/24/2021    HRVS Negative 01/24/2021    ADVBE Negative 01/24/2021    ADVC Negative 01/24/2021    ADVC Negative 12/23/2020    ADVC Negative 10/07/2019       Historical CMV results (last 3 of prior testing):  Lab Results   Component Value Date    CMVQNT CMV DNA Not Detected 02/25/2021    CMVQNT <137 (A) 01/25/2021    CMVQNT 238 (A) 01/24/2021     Lab Results   Component Value Date     CMVLOG Not Calculated 02/25/2021    CMVLOG <2.1 01/25/2021    CMVLOG 2.4 (H) 01/24/2021       Urine Studies    Recent Labs   Lab Test 01/24/21  1729 10/21/19  2240   URINEPH 5.0 5.0   NITRITE Negative Negative   LEUKEST Moderate* Large*   WBCU 34* 115*       Most Recent [place] Pulmonary Function Testing (FVC/FEV1 only)  FVC-Pre   Date Value Ref Range Status   04/08/2021 1.41 L    12/09/2020 1.12 L    09/09/2020 1.56 L    03/09/2020 1.57 L      FVC-%Pred-Pre   Date Value Ref Range Status   04/08/2021 43 %    12/09/2020 34 %    09/09/2020 47 %    03/09/2020 48 %      FEV1-Pre   Date Value Ref Range Status   04/08/2021 1.09 L    12/09/2020 0.98 L    09/09/2020 1.10 L    03/09/2020 0.96 L      FEV1-%Pred-Pre   Date Value Ref Range Status   04/08/2021 42 %    12/09/2020 37 %    09/09/2020 42 %    03/09/2020 37 %        IMAGING    Recent Results (from the past 48 hour(s))   CT Chest Tube with Cath Placement    Narrative    Examination 4/29/2021:   Left chest tube placement under CT guidance.    HISTORY:    60-year-old female with left pleural collection. Drainage  of left pleural collection requested.    COMPARISON: CT chest 4/8/2021.    Staff: BHAVANI Mckeon MD.    Fellow: Meet Rich M.D.    Resident: Chris Wilder MD.    MEDICATIONS: Continuous monitoring of intravenous conscious sedation  was performed by the Radiology nurse. Vital signs throughout the  procedure remained stable.      Medications:  1. Fentanyl 100 mcg IV  2. Versed 2 mg IV  3. 1% lidocaine 10 ml local anesthesia    Face-to-face sedation time: 20 minutes    Description of procedure: Verbal and written consent were obtained  prior to procedure. The patient was fully aware of the benefits and  risks of the procedure. All questions were answered to the patient's  satisfaction.    The patient was prepped and draped in the usual sterile fashion, in  right lateral decubitus position. A preprocedural CT scan was  performed which demonstrated left  pleural collection. Left back was  prepped and draped in usual sterile fashion. Under CT fluoroscopic  guidance, approximately 10 cc of 1% lidocaine was injected  subcutaneously for skin anesthesia, including the planned tract to the  level of the pleura. A small skin nick was made with a #11 blade  scalpel. Next, a 5 Irish Yueh catheter was advanced into the pleural  space under ultrasound guidance. Once the needle catheter system was  in the pleural space, the needle was fixed and the catheter was  advanced. The needle was removed and exchanged for a 0.035 stiff  Amplatz wire which was coiled in the left pleural collection. The  tract was dilated with 12 and 14 Irish dilators. Subsequently, a 14  Irish nonlocking pigtail catheter was advanced into the left pleural  collection. About 30 cc of thick pus aspirated and sent to the  laboratory for analysis. The chest tube was placed to water seal.     The patient tolerated the procedure well and was without complaint.  The patient was transferred in stable condition.      Impression    IMPRESSION:   Successful placement of a 14 Irish nonlocking pigtail catheter chest  tube in left pleural collection. About 30 cc of thick pus aspirated  and sent to the laboratory for analysis.    PLAN:   Chest tube to waterseal drainage and low wall suction..   XR Chest 2 Views    Narrative    EXAM: XR CHEST 2 VW  4/30/2021 11:06 AM     HISTORY:  s/p chest tube placement, h/o BSLT       COMPARISON:  Chest x-ray 4/8/2021. CT chest 4/29/2021    FINDINGS:   PA and lateral radiographs of the chest. Postsurgical changes of  bilateral lung transplant with intact median sternotomy wires. New  posterior approach left pigtail drain. Streaky bibasilar studies. No  significant pleural effusion. No pneumothorax. Accessory azygous lobe.  Surgical clips project over the right axilla. Multiple air distended  loops of bowel in the visualized upper abdomen.      Impression    IMPRESSION:   1. New  left basilar chest tube.  2. Streaky perihilar and bibasilar opacities, likely atelectasis and  edema.    I have personally reviewed the examination and initial interpretation  and I agree with the findings.    ADAM GONZALEZ MD

## 2021-04-30 NOTE — PHARMACY-ADMISSION MEDICATION HISTORY
Admission Medication History Completed by Pharmacy    See [facility] Admission Navigator for allergy information, preferred outpatient pharmacy, prior to admission medications and immunization status.     Medication History Sources:     Spoke with patient via telephone due to COVID-19 pandemic     Reviewed prescription fill history (Surescripts)    Changes made to PTA medication list (reason):    Added: None    Deleted: None    Changed: None    Additional Information:    Kecia knew her medications well.     Based on fill history and per patient she started filling her tacrolimus with [facility] in [place], [place]. Per Kecia the appearance of the tacrolimus capsules did not change. Based on dispense history looks like started 3/30/2021.    Prior to Admission medications    Medication Sig Last Dose Taking? Auth Provider   acetaminophen (TYLENOL) 325 MG tablet Take 1 tablet (325 mg) by mouth every 4 hours as needed for mild pain or fever Past Week at Unknown time Yes Leelee Huang MD   albuterol (PROVENTIL) (2.5 MG/3ML) 0.083% neb solution Take 1 vial (2.5 mg) by nebulization 2 times daily 4/28/2021 at PM Yes Yenni Graham MD   budesonide (PULMICORT) 0.5 MG/2ML neb solution Take 2 mLs (0.5 mg) by nebulization 2 times daily 4/28/2021 at PM Yes Yenni Graham MD   fluticasone (FLONASE) 50 MCG/ACT nasal spray Spray 1 spray into both nostrils daily Past Week at Unknown time Yes Ame Chow PA-C   Magnesium Cl-Calcium Carbonate (SLOW-MAG) 71.5-119 MG TBEC Take 3 tablets by mouth four times a week Take on non dialysis days T/Th/Sa/Su 4/29/2021 at 0900 Yes Yenni Graham MD   metoprolol tartrate (LOPRESSOR) 25 MG tablet 1 tablet (25 mg) by Oral or Feeding Tube route 2 times daily 4/29/2021 at 0900 Yes Yenni Graham MD   multivitamin RENAL (RENAVITE RX/NEPHROVITE) 1 MG tablet Take 1 tablet by mouth daily 4/28/2021 at PM Yes Ame Chow PA-C   pantoprazole (PROTONIX) 40 MG EC tablet Take  1 tablet (40 mg) by mouth every morning (before breakfast) 4/29/2021 at 0900 Yes Lissett Rodriguez PA-C   posaconazole (NOXAFIL) 100 MG EC tablet Take 3 tablets (300 mg) by mouth daily 4/29/2021 at 0900 Yes Lissett Rodriguez PA-C   predniSONE (DELTASONE) 2.5 MG tablet Take 1 tablet (2.5 mg) by mouth every evening 4/28/2021 at PM Yes Ame Chow PA-C   predniSONE (DELTASONE) 5 MG tablet Take 1 tablet (5 mg) by mouth every morning 4/29/2021 at 0900 Yes Ame Chow PA-C   sulfamethoxazole-trimethoprim (BACTRIM) 400-80 MG tablet Take 1 tablet by mouth Every Mon, Wed, Fri Morning 4/28/2021 at Unknown time Yes Ame Chow PA-C   tacrolimus (GENERIC EQUIVALENT) 0.5 MG capsule Take 1 capsule (0.5 mg) by mouth every morning Total dose: 0.5 mg in the AM and 1 mg in the PM 4/29/2021 at 0900 Yes Ame Chow PA-C   tacrolimus (GENERIC EQUIVALENT) 1 MG capsule Take 1 capsule (1 mg) by mouth every evening Total dose: 0.5 mg in the AM and 1 mg in the PM 4/28/2021 at Unknown time Yes Ame Chow PA-C   blood glucose (NO BRAND SPECIFIED) test strip Use to test blood sugar 4 times daily or as directed. Unknown at Unknown time  Lissett Rodriguez PA-C   blood glucose (NO BRAND SPECIFIED) test strip Use to test blood sugar 4 times daily or as directed. Unknown at Unknown time  Lissett Rodriguez PA-C   ondansetron (ZOFRAN-ODT) 4 MG ODT tab Take 1 tablet (4 mg) by mouth every 6 hours as needed for nausea or vomiting More than a month at Unknown time  Lissett Rodriguez PA-C       Date completed: 04/29/21    Medication history completed by: Antoinette Lopez, Pharm.D., [facility]  Pager [phone]

## 2021-04-30 NOTE — PROCEDURES
INTERVENTIONAL PULMONOLOGY       Procedure(s):    A flexible and rigid bronchoscopy   Airway exam  Laser assisted tissue debulking and airway dilation  Therapeutic suctioning (1 sites)    Indication:  History of bilateral lung transplant c/b RM stenosis    Attending of Record:  Mati Norris MD     Interventional Pulmonary Fellow   Juana Baugh MD     Trainees Present:   None     Medications:    General Anesthesia - See anesthesia flowsheet for details    Sedation Time:   Per Anesthesia Care Provider    Time Out:  Performed    The patient's medical record has been reviewed.  The indication for the procedure was reviewed.  The necessary history and physical examination was performed and reviewed.  The risks, benefits and alternatives of the procedure were discussed with the the patient in detail and she had the opportunity to ask questions. All questions were answered to the best of my ability.  Verbal and written informed consent was obtained.  The proposed procedure and the patient's identification were verified prior to the procedure by the physician and the surgical team.    After clinical evaluation and reviewing the indication, risks, alternatives and benefits of the procedure the patient was deemed to be in satisfactory condition to undergo the procedure.      A Tuberculosis risk assessment was performed:  The patient has no known RISK of Tuberculosis    The procedure was performed in a negative airflow room: The patient could not be moved to a negative airflow room because of needed OR for the procedure    Maneuvers / Procedure:      A Flexible and 12mm Rigid bronchoscope bronchoscope was used for the procedure. The rigid bronchoscope was inserted into the mouth. Uvula, epiglottis and vocal cords were seen. The scope was advanced turning the bevel to 90degress while passing through the cords and into the trachea.     Airway Examination: A complete airway examination was performed from the distal trachea  to the subsegmental level in each lobe of both lungs.  Pertinent findings include: RM anastomosis with ~70% narrowing. Unable to pass therapeutic scope through. LM anastomosis was open without stenosis.     CO2 laser was used to release a band of granulation tissue adhered to the posterior membrane on the medial wall of the BI. After release of this tissue, therapeutic scope was easily entered into the BI.       Therapeutic suctioning: 15-20min of operative time was spent clearing out the airway of debris, blood and mucous.    Any disposable equipment was visually inspected and deemed to be intact immediately post procedure.      Relevant Pictures  Right mainstem anastomosis prior to intervention      BI after laser assisted tissue debulking, granulation tissue seen peeling away from posterior membrane      Right mainstem anastomosis after laser assisted tissue debulking      Left mainstem anastomosis      Recommendations:     --Return to floor  --Will plan on repeat bronchoscopy in about 2 months to assess whether further tissue debulking is beneficial.    Mati Norris MD, MHA    of Medicine  Interventional Pulmonary  Department of Pulmonary, Allergy, Critical Care and Sleep Medicine   [facility]  Pager: [phone]   Office: [phone]  Email: [email or web address]    Krystle CHAVEZ, OCN  Clinical Nurse Specialist  Department of Thoracic Surgery  Office: [phone]  Email: [email or web address]    Chloe Avila  Interventional Pulmonology Surgery Scheduler  Office: [phone]  Email: [email or web address]

## 2021-04-30 NOTE — ANESTHESIA CARE TRANSFER NOTE
Patient: Kecia Blue    Procedure(s):  Flexible and Rigid Bronchoscopy and Tissue Debulking with CO2 Laser Assistance    Diagnosis: Bronchial stenosis, right [J98.09]  Diagnosis Additional Information: No value filed.    Anesthesia Type:   General     Note:    Oropharynx: oropharynx clear of all foreign objects  Level of Consciousness: awake  Oxygen Supplementation: face mask  Level of Supplemental Oxygen (L/min / FiO2): 6 LPM  Independent Airway: airway patency satisfactory and stable  Dentition: dentition unchanged  Vital Signs Stable: post-procedure vital signs reviewed and stable  Report to RN Given: handoff report given  Patient transferred to: PACU    Handoff Report: Identifed the Patient, Identified the Reponsible Provider, Reviewed the pertinent medical history, Discussed the surgical course, Reviewed Intra-OP anesthesia mangement and issues during anesthesia, Set expectations for post-procedure period and Allowed opportunity for questions and acknowledgement of understanding      Vitals: (Last set prior to Anesthesia Care Transfer)  CRNA VITALS  4/30/2021 1635 - 4/30/2021 1717      4/30/2021             Temp 2:  (!) 0  C (32  F)        Electronically Signed By: ADRIÁN Knight CRNA  April 30, 2021  5:17 PM

## 2021-05-01 ENCOUNTER — APPOINTMENT (OUTPATIENT)
Dept: GENERAL RADIOLOGY | Facility: CLINIC | Age: 61
End: 2021-05-01
Attending: INTERNAL MEDICINE
Payer: COMMERCIAL

## 2021-05-01 ENCOUNTER — RESULTS ONLY (OUTPATIENT)
Dept: OTHER | Facility: CLINIC | Age: 61
End: 2021-05-01

## 2021-05-01 LAB
ANION GAP SERPL CALCULATED.3IONS-SCNC: 7 MMOL/L (ref 3–14)
APPEARANCE FLD: NORMAL
BUN SERPL-MCNC: 33 MG/DL (ref 7–30)
CALCIUM SERPL-MCNC: 9 MG/DL (ref 8.5–10.1)
CHLORIDE SERPL-SCNC: 98 MMOL/L (ref 94–109)
CHOLEST FLD-MCNC: 103 MG/DL
CO2 SERPL-SCNC: 28 MMOL/L (ref 20–32)
COLOR FLD: NORMAL
CREAT SERPL-MCNC: 2.72 MG/DL (ref 0.52–1.04)
GFR SERPL CREATININE-BSD FRML MDRD: 18 ML/MIN/{1.73_M2}
GLUCOSE SERPL-MCNC: 229 MG/DL (ref 70–99)
LDH FLD L TO P-CCNC: 4706 U/L
LDH SERPL L TO P-CCNC: 164 U/L (ref 81–234)
OTHER CELLS FLD MANUAL: 0 %
POTASSIUM SERPL-SCNC: 4.3 MMOL/L (ref 3.4–5.3)
PROT FLD-MCNC: 3.3 G/DL
PROT SERPL-MCNC: 7.2 G/DL (ref 6.8–8.8)
SODIUM SERPL-SCNC: 132 MMOL/L (ref 133–144)
SPECIMEN SOURCE FLD: NORMAL
TRIGL FLD-MCNC: 70 MG/DL
WBC # FLD AUTO: 9460 /UL

## 2021-05-01 PROCEDURE — 71046 X-RAY EXAM CHEST 2 VIEWS: CPT

## 2021-05-01 PROCEDURE — 94640 AIRWAY INHALATION TREATMENT: CPT | Mod: 76

## 2021-05-01 PROCEDURE — 250N000013 HC RX MED GY IP 250 OP 250 PS 637: Performed by: NURSE PRACTITIONER

## 2021-05-01 PROCEDURE — 250N000012 HC RX MED GY IP 250 OP 636 PS 637: Performed by: NURSE PRACTITIONER

## 2021-05-01 PROCEDURE — 214N000001 HC R&B CCU UMMC

## 2021-05-01 PROCEDURE — 86833 HLA CLASS II HIGH DEFIN QUAL: CPT | Performed by: THORACIC SURGERY (CARDIOTHORACIC VASCULAR SURGERY)

## 2021-05-01 PROCEDURE — 999N000157 HC STATISTIC RCP TIME EA 10 MIN

## 2021-05-01 PROCEDURE — 94640 AIRWAY INHALATION TREATMENT: CPT

## 2021-05-01 PROCEDURE — 250N000009 HC RX 250: Performed by: NURSE PRACTITIONER

## 2021-05-01 PROCEDURE — 83615 LACTATE (LD) (LDH) ENZYME: CPT | Performed by: NURSE PRACTITIONER

## 2021-05-01 PROCEDURE — 87799 DETECT AGENT NOS DNA QUANT: CPT | Performed by: NURSE PRACTITIONER

## 2021-05-01 PROCEDURE — 87206 SMEAR FLUORESCENT/ACID STAI: CPT | Performed by: NURSE PRACTITIONER

## 2021-05-01 PROCEDURE — 80048 BASIC METABOLIC PNL TOTAL CA: CPT | Performed by: NURSE PRACTITIONER

## 2021-05-01 PROCEDURE — 86832 HLA CLASS I HIGH DEFIN QUAL: CPT | Performed by: THORACIC SURGERY (CARDIOTHORACIC VASCULAR SURGERY)

## 2021-05-01 PROCEDURE — 36415 COLL VENOUS BLD VENIPUNCTURE: CPT | Performed by: NURSE PRACTITIONER

## 2021-05-01 PROCEDURE — 87102 FUNGUS ISOLATION CULTURE: CPT | Performed by: NURSE PRACTITIONER

## 2021-05-01 PROCEDURE — 71046 X-RAY EXAM CHEST 2 VIEWS: CPT | Mod: 26 | Performed by: RADIOLOGY

## 2021-05-01 PROCEDURE — 99233 SBSQ HOSP IP/OBS HIGH 50: CPT | Performed by: INTERNAL MEDICINE

## 2021-05-01 PROCEDURE — 87116 MYCOBACTERIA CULTURE: CPT | Performed by: NURSE PRACTITIONER

## 2021-05-01 PROCEDURE — 84155 ASSAY OF PROTEIN SERUM: CPT | Performed by: NURSE PRACTITIONER

## 2021-05-01 PROCEDURE — 99207 PR CDG-CUT & PASTE-POTENTIAL IMPACT ON LEVEL: CPT | Performed by: INTERNAL MEDICINE

## 2021-05-01 RX ADMIN — POSACONAZOLE 300 MG: 100 TABLET, COATED ORAL at 08:46

## 2021-05-01 RX ADMIN — B-COMPLEX W/ C & FOLIC ACID TAB 1 MG 1 TABLET: 1 TAB at 18:10

## 2021-05-01 RX ADMIN — ACETYLCYSTEINE 2 ML: 200 SOLUTION ORAL; RESPIRATORY (INHALATION) at 08:17

## 2021-05-01 RX ADMIN — TACROLIMUS 0.5 MG: 0.5 CAPSULE ORAL at 18:10

## 2021-05-01 RX ADMIN — ALBUTEROL SULFATE 2.5 MG: 2.5 SOLUTION RESPIRATORY (INHALATION) at 20:29

## 2021-05-01 RX ADMIN — BUDESONIDE 0.5 MG: 0.5 INHALANT RESPIRATORY (INHALATION) at 08:17

## 2021-05-01 RX ADMIN — PREDNISONE 5 MG: 5 TABLET ORAL at 08:45

## 2021-05-01 RX ADMIN — ALBUTEROL SULFATE 2.5 MG: 2.5 SOLUTION RESPIRATORY (INHALATION) at 08:19

## 2021-05-01 RX ADMIN — BUDESONIDE 0.5 MG: 0.5 INHALANT RESPIRATORY (INHALATION) at 20:29

## 2021-05-01 RX ADMIN — PREDNISONE 2.5 MG: 2.5 TABLET ORAL at 19:37

## 2021-05-01 RX ADMIN — ACETYLCYSTEINE 2 ML: 200 SOLUTION ORAL; RESPIRATORY (INHALATION) at 20:29

## 2021-05-01 RX ADMIN — MAGNESIUM 64 MG (MAGNESIUM CHLORIDE) TABLET,DELAYED RELEASE 1605 MG: at 18:10

## 2021-05-01 RX ADMIN — TACROLIMUS 0.5 MG: 0.5 CAPSULE ORAL at 08:45

## 2021-05-01 RX ADMIN — METOPROLOL TARTRATE 25 MG: 25 TABLET, FILM COATED ORAL at 08:45

## 2021-05-01 RX ADMIN — ACETAMINOPHEN 650 MG: 325 TABLET, FILM COATED ORAL at 13:48

## 2021-05-01 RX ADMIN — METOPROLOL TARTRATE 25 MG: 25 TABLET, FILM COATED ORAL at 19:37

## 2021-05-01 RX ADMIN — PANTOPRAZOLE SODIUM 40 MG: 40 TABLET, DELAYED RELEASE ORAL at 08:45

## 2021-05-01 ASSESSMENT — ACTIVITIES OF DAILY LIVING (ADL)
ADLS_ACUITY_SCORE: 15

## 2021-05-01 NOTE — PROGRESS NOTES
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 Recent admission 1/21-2/25 (discharged from ARU 3/4/21) with PJP pneumonia, ARDS, and Septic shock. Admitted 4/29/21 for thoracentesis and chest tube placement to loculated left pleural effusion with possible future pleurodesis, and bronchoscopy with possible dilation/stent/tissue debulking on 4/30 with Dr. Norris (completed).     Aspergillus empyema (left-sided): Noted 10/8/19. CT scan on 7/17/20 with LLL increased pleural-based mass-like density, s/p needle aspiration (8/13/20) with Aspergillus fumigatus on cultures, s/p intrapleural amphotericin bead placement (11/20). Negative cultures 11/2020 and again on 1/25/21. Pleural effusion has persisted with monitoring clinically off ABX and follows closely as OP with ID. Most recent BDG fungitell (1/23) positive at 311. Most recent ID visit (4/13) notes high concern for high morbidity with surgery and therefore planned continue to manage medically with long term posaconazole. Effusion persists, s/p planned left pleural chest tube placement and thoracentesis (4/29), with possible future pleurodesis. Remains afebrile, no leukocytosis.  - Bacterial pleural cultures negative to date.  - Pleural cultures (fungal, AFB) pending  -Pleural fluid LDH markedly elevated at 4706 with total protein 3.3, cholesterol 103 and triglycerides 70.  White blood cell count is 9460 but no differential because \"cells to damage to perform differential\".  Markedly elevated LDH, may reflect lysed cells.  Fluid consistent with exudate.  - Continue PTA Posaconazole 300 mg TID   - " A. galactomannan Ag and BDG fungitell, pending  - Defer antibiotics and ID consult for now pending cultures, low threshold if decompensates  - Left pleural chest tube to 20 cm wall suction  -Anticipate pleurodesis next week assuming pleural cultures remain negative      Right main bronchial stenosis s/p dilatation: Follow with Dr. Norris for serial bronchoscopies with dilation. Last bronch 2/19/21.  -Right mainstem/bronchus intermedius dilation and debulking with good bronchoscopic results and no complications noted.  - Airway clearance with IS, Aerobika, and Nebs: Mucomyst, Albuterol, and Pulmicort BID     S/p bilateral lung transplant for ILD 2/2 CTD:  Recent admission, as above, with Stenotrophomonas, Eikenella, and PJP pneumonia. Last seen in pulmonary clinic 4/8/21 (virtual). PFTs at that time improved from prior, still below post transplant best.  Most recent DSA (1/25) and CMV (4/22) not detected.  -Chest x-ray (5/1) reviewed by me with left chest tube in good position with near complete resolution of the pleural collection and decreased right basilar atelectasis.  - DSA (5/1) pending     Immunosuppression: On 2 drug IST d/t recurrent infections  - Tacrolimus level (4/30) supratherapeutic at 15.3 (10 hr). Goal level 8-10.   Dose reduced to 0.5 mg BID. Repeat level 5/3 (ordered)  - Prednisone 5 mg qAM / 2.5 mg qPM     Prophylaxis:   - Bactrim MWF PPx     EBV viremia:  Level 12/9/20 mildly elevated to 10K (log 4) from prior 3K (3.5 log) on 9/9/20, repeat (1/25/21) decreased to 5619 copies (log of 3.8). Most recent level 2/22 elevated to 75K/log 4.9, thought to be secondary to acute illness and recent steroid burst. CT chest (w/o contrast), with no thoracic adenopathy.   - Repeat EBV pending     Other relevent problems managed by primary team:     CKD stage IV: Creatinine stable on admission at 3.4. Recent baseline 2.4-3.4 and has worsened since 2/2021. Dialysis MWF.  - Will continue to monitor tacrolimus levels  -  Dialysis per nephrology     We appreciate the excellent care provided by the [place] [id number] team.  Recommendations communicated via in person rounding and this note.  Will continue to follow along closely, please do not hesitate to call with any questions or concerns.    Lopez Macias MD  [phone]         Interval History:   The patient underwent dilation of the right mainstem and bronchus intermedius stenosis with no complications noted in the procedure note.  Dyspnea at rest: None  Dyspnea on exertion: Tolerating ambulation without difficulty.  Cough: Chronic cough, unchanged.  Sputum: Minimal clear sputum.  Hemoptysis: None  Chest Pain: None           Review of Systems:   C: NEGATIVE for fever, chills  INTEGUMENTARY/SKIN: no rash or obvious new lesions  ENT/MOUTH: no sore throat, new sinus pain or nasal drainage  RESP: see interval history  CV: NEGATIVE for chest pain, palpitations or peripheral edema  GI: no nausea, vomiting, change in stools  : no dysuria  MUSCULOSKELETAL: no myalgias, arthralgias            Medications:       acetylcysteine  2 mL Nebulization BID     albuterol  2.5 mg Nebulization BID     budesonide  0.5 mg Nebulization BID     fluticasone  1 spray Both Nostrils Daily     magnesium chloride  1,605 mg Oral Once per day on Sun Tue Thu Sat     metoprolol tartrate  25 mg Oral or Feeding Tube BID     multivitamin RENAL  1 tablet Oral Daily with supper     pantoprazole  40 mg Oral QAM AC     posaconazole  300 mg Oral Daily     predniSONE  2.5 mg Oral QPM     predniSONE  5 mg Oral QAM     sodium chloride (PF)  3 mL Intracatheter Q8H     sulfamethoxazole-trimethoprim  1 tablet Oral Q Mon Wed Fri AM     tacrolimus  0.5 mg Oral BID IS     acetaminophen, lidocaine 4%, lidocaine (buffered or not buffered), melatonin, naloxone, naloxone, naloxone, naloxone, ondansetron **OR** ondansetron, polyethylene glycol, sodium chloride (PF)         Physical Exam:   Temp:  [97.5  F (36.4  C)-99.5  F (37.5  C)]  98.3  F (36.8  C)  Pulse:  [] 80  Resp:  [10-29] 16  BP: (132-168)/() 144/77  SpO2:  [66 %-100 %] 100 %    Intake/Output Summary (Last 24 hours) at 5/1/2021 0933  Last data filed at 5/1/2021 0651  Gross per 24 hour   Intake 100 ml   Output 1811 ml   Net -1711 ml     Constitutional:   Awake, alert and in no apparent distress     Eyes:   nonicteric     Lungs:   Good air flow.  Faint, coarse, right basilar crackles. No rhonchi.  Coarse bilateral expiratory wheezes     Cardiovascular:   Normal S1 and S2.  RRR.  No murmur, gallop or rub.     Abdomen:   NABS, soft, nontender, nondistended.  No HSM.     Musculoskeletal:   No edema     Neurologic:   Alert and conversant.     Skin:   Warm, dry.  No rash on limited exam.             Data:   All laboratory and imaging data reviewed.    Results for orders placed or performed during the hospital encounter of 04/29/21 (from the past 24 hour(s))   XR Chest 2 Views    Narrative    EXAM: XR CHEST 2 VW  4/30/2021 11:06 AM     HISTORY:  s/p chest tube placement, h/o BSLT       COMPARISON:  Chest x-ray 4/8/2021. CT chest 4/29/2021    FINDINGS:   PA and lateral radiographs of the chest. Postsurgical changes of  bilateral lung transplant with intact median sternotomy wires. New  posterior approach left pigtail drain. Streaky bibasilar studies. No  significant pleural effusion. No pneumothorax. Accessory azygous lobe.  Surgical clips project over the right axilla. Multiple air distended  loops of bowel in the visualized upper abdomen.      Impression    IMPRESSION:   1. New left basilar chest tube.  2. Streaky perihilar and bibasilar opacities, likely atelectasis and  edema.    I have personally reviewed the examination and initial interpretation  and I agree with the findings.    ADAM GONZALEZ MD   Hepatitis B Surface Antibody   Result Value Ref Range    Hepatitis B Surface Antibody 0.00 <8.00 m[IU]/mL   Hepatitis B surface antigen   Result Value Ref Range    Hep B Surface  Agn Nonreactive NR^Nonreactive   Glucose by meter   Result Value Ref Range    Glucose 110 (H) 70 - 99 mg/dL   Cell count with differential fluid   Result Value Ref Range    Body Fluid Analysis Source Pleural fluid     Color Fluid Slightly Bloody     Appearance Fluid Cloudy     WBC Fluid 9460 /uL    % Other Cells Fluid 0 %   Lactate dehydrogenase fluid   Result Value Ref Range    LD Fluid Source Pleural fluid     Lactate Dehydrogenase Fluid 4,706 U/L   Protein fluid   Result Value Ref Range    Protein Total Fluid Source Pleural fluid     Protein Total Fluid 3.3 g/dL   Cholesterol fluid   Result Value Ref Range    Cholesterol Fluid Source Pleural fluid     Cholesterol Fluid 103 mg/dL   Triglyceride Fluid   Result Value Ref Range    Triglyceride Fluid Source Pleural fluid     Triglyceride Fluid 70 mg/dL   Lactate Dehydrogenase   Result Value Ref Range    Lactate Dehydrogenase 164 81 - 234 U/L   Protein total   Result Value Ref Range    Protein Total 7.2 6.8 - 8.8 g/dL   Basic metabolic panel   Result Value Ref Range    Sodium 132 (L) 133 - 144 mmol/L    Potassium 4.3 3.4 - 5.3 mmol/L    Chloride 98 94 - 109 mmol/L    Carbon Dioxide 28 20 - 32 mmol/L    Anion Gap 7 3 - 14 mmol/L    Glucose 229 (H) 70 - 99 mg/dL    Urea Nitrogen 33 (H) 7 - 30 mg/dL    Creatinine 2.72 (H) 0.52 - 1.04 mg/dL    GFR Estimate 18 (L) >60 mL/min/[1.73_m2]    GFR Estimate If Black 21 (L) >60 mL/min/[1.73_m2]    Calcium 9.0 8.5 - 10.1 mg/dL     *Note: Due to a large number of results and/or encounters for the requested time period, some results have not been displayed. A complete set of results can be found in Results Review.

## 2021-05-01 NOTE — PLAN OF CARE
Pleural effusion s/p chest tube 4/29, PMH of Bilateral lung transplant (3/2018), ESRD on hemodialysis (MWF), paroxymal afib.    Neuro: A&O x4, denied pain, palpitations, difficulty breathing, SOB, dizziness, and nausea.  Respiratory:  Had SOB, lung sounds wheezing on inspiration  Cardiac: S1, S2 heart sounds noted.  GI: Denied nausea, bowel sounds normoactive.   : Had 0 urine output, see I&O flowsheet.  Skin: See PCS for assessment and treatment of wounds and surgical incisions.   VS:  HR 86, /86, SaO2 97% on RA.    Pain: Reported 0/10 pain      Plan:  Continue to monitor pain, VS, heart rhythm, fluid status, bowel status, cardiac and respiratory status.  Notify care team of changes in patient condition or other concerns

## 2021-05-01 NOTE — PROGRESS NOTES
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  Pleural fluid cx obtained.      - s/p bronchoscopy on 4/30 w/ Dr. Norris for possible dilation and stent/tissue debulking. (plan on repeat bronchoscopy in about 2 months to assess whether further tissue debulking is beneficial.)  - Transplant Pulmonary following, appreciate recommendations (Detials of immunosuppresion as in there note)  - IS: continue Prednisone 5mg/2.5mg, Tacro (goal 8-10)   - Continue PTA Posaconazole 300mg daily   - Continue PTA Bactrim on MWF   - Continue Albuterol nebs BID  - Continue Pulmicort nebs BID   - Continue Flonase daily   - Follow up CT chest in 7/2021 per Transplant ID     * Follow up cultures    # ESRD on HD MWF - Follows w/ [facility] under Dr. Glasgow via AVF/G.  On HD since 11/2019.  Non-oliguiric, mostly urinates in the morning hours.    - Nephrology consult to continue HD      # Hx PAF, SVT - Per chart review, hx of AF w/ RVR during periods of acute illness and acute respiratory failure.  Follows w/ Cards EP, last seen by Dr. Mcmahan on 4/21.  Outpt  "Ziopatch study neg for AF, though did have frequent runs of SVT but was asymptomatic.  Currently on Metoprolol 25mg BID for rate control.  [id number] 1.  Per Cardiology, no indication for anticoagulation at this time.   - Continue PTA Metoprolol w/ hold parameters   - Follow up with Cards EP as needed     # Hypomagnesemia -   Currently on Slow-Mag 71.5-119mg on non-HD days.   - Continue PTA Slow-mag supplementation   - Ensure stable lytes given hx SVT, PAF      # Anemia - Likely 2/2 ESRD.       Diet: Regular Diet Adult    DVT Prophylaxis: Pneumatic Compression Devices  Basilio Catheter: not present  Code Status: Full Code           Disposition Plan   Expected discharge: 2 - 3 days, recommended to prior living arrangement once breahting stable and plan for chest tube.  Entered: Maxim Norman MD, MD 05/01/2021, 1:34 PM       The patient's care was discussed with the Bedside Nurse, Care Coordinator/ and Patient.    Maxim Norman MD, MD  Hospitalist Service  [facility]  Contact information available via [facility] Paging/Directory  Please see sign in/sign out for up to date coverage information  ______________________________________________________________________    Interval History   Last 24 hr events noted  Tolerated procedure well.   Denies Chest pain/ SOB/ cough, Denies any N&V/ bowel problems  Denies urinary problems , Denies fever/chills/rigors     Data reviewed today: I reviewed all medications, new labs and imaging results over the last 24 hours. I personally reviewed the chest x-ray image(s) showing as below.    Physical Exam   BP (!) 142/76 (BP Location: Right arm)   Pulse 86   Temp 97.6  F (36.4  C) (Oral)   Resp 18   Ht 1.6 m (5' 3\")   Wt 51 kg (112 lb 7 oz)   LMP 06/07/2014 (Exact Date)   SpO2 100%   BMI 19.92 kg/m    Gen- pleasant  lying in bed  HEENT- NAD, DILIP  Neck- supple, no JVD elevation, no thyromegaly  CVS- I+II+ no m/r/g  RS- CTAB, " decreased at base with LL - chest tube   Abdo- soft, no tenderness . No g/r/r  Ext- no edema   CNS- no focal signs     Data    BMP  Recent Labs   Lab 05/01/21  0654 04/30/21  0606   * 136   POTASSIUM 4.3 4.1   CHLORIDE 98 101   CHELSEY 9.0 8.6   CO2 28 27   BUN 33* 45*   CR 2.72* 3.40*   * 130*     CBC  Recent Labs   Lab 04/30/21  0606 04/29/21  1020   WBC 6.8 6.3   RBC 3.41* 3.45*   HGB 10.9* 11.1*   HCT 34.4* 34.5*   * 100   MCH 32.0 32.2   MCHC 31.7 32.2   RDW 13.9 13.9    218     INR  Recent Labs   Lab 04/29/21  1020   INR 0.99         Recent Results (from the past 24 hour(s))   XR Chest 2 Views    Narrative    Exam: XR CHEST 2 VW, 5/1/2021 11:49 AM    Indication: follow up for effusion and chest tube    Comparison: 4/30/2021    Findings:   Left chest tube is still in place. No pneumothorax. Near complete  resolution of the loculated left pleural effusion. Heart and pulmonary  vasculature within normal limits. Perihilar and lower lobe hazy  opacity suggests atelectasis and/or edema.      Impression    Impression: Near complete resolution of the loculated left pleural  effusion.    ANNE MANZANO MD

## 2021-05-01 NOTE — PLAN OF CARE
"/72 (BP Location: Right arm)   Pulse 80   Temp 97.7  F (36.5  C) (Oral)   Resp 18   Ht 1.6 m (5' 3\")   Wt 51 kg (112 lb 7 oz)   LMP 06/07/2014 (Exact Date)   SpO2 100%   BMI 19.92 kg/m      Time: 4874-1548  Status:POD 2 s/p left chest tube placement for persistent pleural effusion. Hx of ILD s/p bilateral lung transplant in 03/2018, HTN, ESRD on HD MWF.   Neuro:  A&Ox4  Activity: up with one assist,stayed in bed this shift.   Pain: denied   Cardiac: Afib with HR 80-140s, asymptomatic. Denied chest pain. Denied DESHPANDE and BP stable, afebrile.    Respiratory: 2 L O2 supplement via nc at night for continuous desating. LS inspiratory wheezing to right lobes, and clear/diminished to left lung. Deep breathing encouraged.   GI/: no bm this shift. Active bowel sound. Anuria, pt is on HD (MWF).   Diet: regular, denied nausea.   Skin: no skin deficit. Chest tube dressing CDI.   LDAs: PIV saline locked. Fistula to left arm, covered with dressing.   Labs/Imaging: pleural fluid specimen collected last night was not enough. Lab required new specimen. Cross cover wants to waiting for primary team to decide whether more sample is needed or not.   New change this shift: none   Plan: continue to monitor chest tube, contact primary team for specimen/lab update.       "

## 2021-05-02 ENCOUNTER — APPOINTMENT (OUTPATIENT)
Dept: GENERAL RADIOLOGY | Facility: CLINIC | Age: 61
End: 2021-05-02
Attending: INTERNAL MEDICINE
Payer: COMMERCIAL

## 2021-05-02 LAB
1,3 BETA GLUCAN SER-MCNC: 459 PG/ML
ACID FAST STN SPEC QL: NORMAL
ANION GAP SERPL CALCULATED.3IONS-SCNC: 9 MMOL/L (ref 3–14)
B-D GLUCAN INTERPRETATION (1,3): POSITIVE
BUN SERPL-MCNC: 66 MG/DL (ref 7–30)
CALCIUM SERPL-MCNC: 8.3 MG/DL (ref 8.5–10.1)
CHLORIDE SERPL-SCNC: 97 MMOL/L (ref 94–109)
CO2 SERPL-SCNC: 26 MMOL/L (ref 20–32)
CREAT SERPL-MCNC: 4.12 MG/DL (ref 0.52–1.04)
ERYTHROCYTE [DISTWIDTH] IN BLOOD BY AUTOMATED COUNT: 13.9 % (ref 10–15)
GALACTOMANNAN AG SERPL QL IA: NEGATIVE
GALACTOMANNAN AG SERPL-ACNC: 0.08
GFR SERPL CREATININE-BSD FRML MDRD: 11 ML/MIN/{1.73_M2}
GLUCOSE SERPL-MCNC: 122 MG/DL (ref 70–99)
HCT VFR BLD AUTO: 33.8 % (ref 35–47)
HGB BLD-MCNC: 10.6 G/DL (ref 11.7–15.7)
MCH RBC QN AUTO: 30.5 PG (ref 26.5–33)
MCHC RBC AUTO-ENTMCNC: 31.4 G/DL (ref 31.5–36.5)
MCV RBC AUTO: 97 FL (ref 78–100)
MYCOBACTERIUM SPEC CULT: NORMAL
PLATELET # BLD AUTO: 214 10E9/L (ref 150–450)
POTASSIUM SERPL-SCNC: 5 MMOL/L (ref 3.4–5.3)
RBC # BLD AUTO: 3.47 10E12/L (ref 3.8–5.2)
SODIUM SERPL-SCNC: 132 MMOL/L (ref 133–144)
SPECIMEN SOURCE: NORMAL
SPECIMEN SOURCE: NORMAL
WBC # BLD AUTO: 7.5 10E9/L (ref 4–11)

## 2021-05-02 PROCEDURE — 71046 X-RAY EXAM CHEST 2 VIEWS: CPT | Mod: 26 | Performed by: STUDENT IN AN ORGANIZED HEALTH CARE EDUCATION/TRAINING PROGRAM

## 2021-05-02 PROCEDURE — 250N000012 HC RX MED GY IP 250 OP 636 PS 637: Performed by: NURSE PRACTITIONER

## 2021-05-02 PROCEDURE — 99233 SBSQ HOSP IP/OBS HIGH 50: CPT | Performed by: INTERNAL MEDICINE

## 2021-05-02 PROCEDURE — 214N000001 HC R&B CCU UMMC

## 2021-05-02 PROCEDURE — 250N000009 HC RX 250: Performed by: NURSE PRACTITIONER

## 2021-05-02 PROCEDURE — 36415 COLL VENOUS BLD VENIPUNCTURE: CPT | Performed by: INTERNAL MEDICINE

## 2021-05-02 PROCEDURE — 999N000157 HC STATISTIC RCP TIME EA 10 MIN

## 2021-05-02 PROCEDURE — 86769 SARS-COV-2 COVID-19 ANTIBODY: CPT | Performed by: INTERNAL MEDICINE

## 2021-05-02 PROCEDURE — 85027 COMPLETE CBC AUTOMATED: CPT | Performed by: INTERNAL MEDICINE

## 2021-05-02 PROCEDURE — 80048 BASIC METABOLIC PNL TOTAL CA: CPT | Performed by: INTERNAL MEDICINE

## 2021-05-02 PROCEDURE — 99207 PR CDG-CUT & PASTE-POTENTIAL IMPACT ON LEVEL: CPT | Performed by: INTERNAL MEDICINE

## 2021-05-02 PROCEDURE — 94640 AIRWAY INHALATION TREATMENT: CPT | Mod: 76

## 2021-05-02 PROCEDURE — 250N000013 HC RX MED GY IP 250 OP 250 PS 637: Performed by: NURSE PRACTITIONER

## 2021-05-02 PROCEDURE — 71046 X-RAY EXAM CHEST 2 VIEWS: CPT

## 2021-05-02 RX ADMIN — B-COMPLEX W/ C & FOLIC ACID TAB 1 MG 1 TABLET: 1 TAB at 16:32

## 2021-05-02 RX ADMIN — ACETAMINOPHEN 650 MG: 325 TABLET, FILM COATED ORAL at 07:57

## 2021-05-02 RX ADMIN — POSACONAZOLE 300 MG: 100 TABLET, COATED ORAL at 07:57

## 2021-05-02 RX ADMIN — MAGNESIUM 64 MG (MAGNESIUM CHLORIDE) TABLET,DELAYED RELEASE 1605 MG: at 16:32

## 2021-05-02 RX ADMIN — METOPROLOL TARTRATE 25 MG: 25 TABLET, FILM COATED ORAL at 19:08

## 2021-05-02 RX ADMIN — TACROLIMUS 0.5 MG: 0.5 CAPSULE ORAL at 18:39

## 2021-05-02 RX ADMIN — BUDESONIDE 0.5 MG: 0.5 INHALANT RESPIRATORY (INHALATION) at 21:16

## 2021-05-02 RX ADMIN — PREDNISONE 2.5 MG: 2.5 TABLET ORAL at 19:08

## 2021-05-02 RX ADMIN — PANTOPRAZOLE SODIUM 40 MG: 40 TABLET, DELAYED RELEASE ORAL at 07:57

## 2021-05-02 RX ADMIN — ALBUTEROL SULFATE 2.5 MG: 2.5 SOLUTION RESPIRATORY (INHALATION) at 08:55

## 2021-05-02 RX ADMIN — ACETYLCYSTEINE 2 ML: 200 SOLUTION ORAL; RESPIRATORY (INHALATION) at 21:16

## 2021-05-02 RX ADMIN — ALBUTEROL SULFATE 2.5 MG: 2.5 SOLUTION RESPIRATORY (INHALATION) at 21:16

## 2021-05-02 RX ADMIN — BUDESONIDE 0.5 MG: 0.5 INHALANT RESPIRATORY (INHALATION) at 08:55

## 2021-05-02 RX ADMIN — ACETYLCYSTEINE 2 ML: 200 SOLUTION ORAL; RESPIRATORY (INHALATION) at 08:55

## 2021-05-02 RX ADMIN — PREDNISONE 5 MG: 5 TABLET ORAL at 07:57

## 2021-05-02 RX ADMIN — METOPROLOL TARTRATE 25 MG: 25 TABLET, FILM COATED ORAL at 07:57

## 2021-05-02 RX ADMIN — TACROLIMUS 0.5 MG: 0.5 CAPSULE ORAL at 07:57

## 2021-05-02 ASSESSMENT — ACTIVITIES OF DAILY LIVING (ADL)
ADLS_ACUITY_SCORE: 13
ADLS_ACUITY_SCORE: 15

## 2021-05-02 NOTE — PROGRESS NOTES
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  Pleural fluid cx obtained.      - s/p bronchoscopy on 4/30 w/ Dr. Norris for possible dilation and stent/tissue debulking. (plan on repeat bronchoscopy in about 2 months to assess whether further tissue debulking is beneficial.)  - Transplant Pulmonary following, appreciate recommendations (Details of immunosuppresion as in there note)  - IS: continue Prednisone 5mg/2.5mg, Tacro (goal 8-10)   - Continue PTA Posaconazole 300mg daily   - Continue PTA Bactrim on MWF   - Continue Albuterol nebs BID  - Continue Pulmicort nebs BID   - Continue Flonase daily   - Follow up CT chest in 7/2021 per Transplant ID     * Follow up cultures. Plan for pleurodesis in next few days    # ESRD on HD MWF - Follows w/ [facility] under Dr. Glasgow via AVF/G.  On HD since 11/2019.  Non-oliguiric, mostly urinates in the morning hours.    - Nephrology consult to continue HD      # Hx PAF, SVT - Per chart review, hx of AF w/ RVR during periods of acute illness and acute respiratory failure.  Follows w/ Cards EP,  "last seen by Dr. Mcmahan on 4/21.  Outpt Ziopatch study neg for AF, though did have frequent runs of SVT but was asymptomatic.  Currently on Metoprolol 25mg BID for rate control.  [id number] 1.  Per Cardiology, no indication for anticoagulation at this time.   - Continue PTA Metoprolol w/ hold parameters   - Follow up with Cards EP as needed     # Hypomagnesemia -   Currently on Slow-Mag 71.5-119mg on non-HD days.   - Continue PTA Slow-mag supplementation   - Ensure stable lytes given hx SVT, PAF      # Anemia - Likely 2/2 ESRD.       Diet: Regular Diet Adult    DVT Prophylaxis: Pneumatic Compression Devices  Basilio Catheter: not present  Code Status: Full Code           Disposition Plan   Expected discharge: 2 - 3 days, recommended to prior living arrangement once breahting stable and plan for chest tube.  Entered: Maxim Norman MD, MD 05/02/2021, 1:42 PM       The patient's care was discussed with the Bedside Nurse, Care Coordinator/ and Patient.    Maxim Norman MD, MD  Hospitalist Service  [facility]  Contact information available via [facility] Paging/Directory  Please see sign in/sign out for up to date coverage information  ______________________________________________________________________    Interval History   Last 24 hr events noted  Doing well, no new concerns   Denies Chest pain/ SOB/ cough, Denies any N&V/ bowel problems  Denies urinary problems , Denies fever/chills/rigors     Data reviewed today: I reviewed all medications, new labs and imaging results over the last 24 hours. I personally reviewed the chest x-ray image(s) showing as below.    Physical Exam   /79 (BP Location: Right arm, Cuff Size: Adult Regular)   Pulse 80   Temp 97.8  F (36.6  C) (Oral)   Resp 16   Ht 1.6 m (5' 3\")   Wt 51 kg (112 lb 7 oz)   LMP 06/07/2014 (Exact Date)   SpO2 98%   BMI 19.92 kg/m    Gen- pleasant  lying in bed  HEENT- NAD, DILIP  Neck- supple, no " JVD elevation, no thyromegaly  CVS- I+II+ no m/r/g  RS- CTAB, decreased at base with LL - chest tube   Abdo- soft, no tenderness . No g/r/r  Ext- no edema   CNS- no focal signs     Data    BMP  Recent Labs   Lab 05/02/21  0715 05/01/21  0654 04/30/21  0606   * 132* 136   POTASSIUM 5.0 4.3 4.1   CHLORIDE 97 98 101   CHELSEY 8.3* 9.0 8.6   CO2 26 28 27   BUN 66* 33* 45*   CR 4.12* 2.72* 3.40*   * 229* 130*     CBC  Recent Labs   Lab 05/02/21  0715 04/30/21  0606 04/29/21  1020   WBC 7.5 6.8 6.3   RBC 3.47* 3.41* 3.45*   HGB 10.6* 10.9* 11.1*   HCT 33.8* 34.4* 34.5*   MCV 97 101* 100   MCH 30.5 32.0 32.2   MCHC 31.4* 31.7 32.2   RDW 13.9 13.9 13.9    216 218     INR  Recent Labs   Lab 04/29/21  1020   INR 0.99         Recent Results (from the past 24 hour(s))   XR Chest 2 Views    Narrative    Exam: XR CHEST 2 VW, 5/2/2021 10:43 AM    Indication: follow up for effusion and chest tube    Comparison: Chest radiographs 5/1/2021    Findings:   PA and lateral views of the chest.    Left pigtail chest tube in stable position projecting over the lower  lobe. Surgical clips project over the right upper lung field. Midline  sternotomy wires are intact. Surgical clips project over the  mediastinum.    The cardiac mediastinal silhouette is within normal limits. Low lung  volumes. Perihilar and bibasilar opacities, likely atelectasis. No  large pleural effusion. No pneumothorax. Gaseous distention of the  colon in the upper abdomen. Chronic compression deformity of the T5  vertebral body, unchanged.      Impression    Impression:   1. Stable left chest tube. The loculated left pleural effusion appears  resolved. No pneumothorax.  2. Chronic compression fracture of T5.    MATT BATEMAN MD

## 2021-05-02 NOTE — PROGRESS NOTES
Nephrology Progress Note      ASSESSMENT AND RECOMMENDATIONS:   Kecia Blue is a 60 year old female with PMH of ILD s/p b/l lung transplant (2018) c/b R mainstem bronchial stenosis requiring dilations, Aspergillus empyema, and CMV viremia, HTN, hx postop DVT not currently on anticoagulation, hx pAfib, SVT, ESRD, admitted for scheduled left sided chest tube placement w/ IR due to persistent loculated pleural effusion.      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 Glasgow. Access: LUE AVG. Run time: 3.5 hrs. EDW: 51 kg (lower?)  - Dialysis tomorrow per MWF schedule    Volume/BP: elevated 140-150's  - EDW 51 kg with usual OP UF 2 to 2.6 kg (may need to lower EDW)    BMD: Ca 8.6, no alb or phos; on paricalcitol 2 mcg per HD  - Continue as above    Anemia: hgb > 10 g/dL, no indication for TASH    S/p L chest tube 4/29, and bronch later today 4/30    Recommendations were communicated to primary team via this note      Chey Matt Yeung MD      REASON FOR CONSULT: ESKD/dialysis    HISTORY OF PRESENT ILLNESS:  Kecia Blue is a 60 year old female with PMH of ILD s/p b/l lung transplant (2018) c/b R mainstem bronchial stenosis requiring dilations, Aspergillus empyema, and CMV viremia, HTN, hx postop DVT not currently on anticoagulation, hx pAfib, SVT, ESRD, admitted for scheduled left sided chest tube placement w/ IR due to persistent loculated pleural effusion.     Pt is seen I her room today. She is feeling well, is doing her hair. She is due for dialysis tomorrow        PAST MEDICAL HISTORY:  Reviewed with patient on 05/02/2021     Past Medical History:   Diagnosis Date     Acute on chronic respiratory failure with hypoxia (H) 02/21/2018     Anisocoria      Antisynthetase syndrome (H) 2014     Anxiety      Aspergillus (H)      Aspergillus pneumonia (H) 11/20/2020     Benign essential hypertension      C.  difficile colitis      Cytomegalovirus (CMV) viremia (H)      Dermatomyositis (H)      Dysplasia of cervix, low grade (ESTRADA 1)      EBV (Franklin-Barr virus) viremia      ESRD (end stage renal disease) on dialysis (H)      ILD (interstitial lung disease) (H)      Lung replaced by transplant (H)      Osteopenia      Paroxysmal atrial fibrillation (H)      Pneumocystis jiroveci pneumonia (H)      PONV (postoperative nausea and vomiting)      Post-menopause      Pulmonary hypertension (H)      Raynaud's disease      Seronegative rheumatoid arthritis (H)        Past Surgical History:   Procedure Laterality Date     BRONCHOSCOPY (RIGID OR FLEXIBLE), DIAGNOSTIC N/A 4/10/2018    Procedure: COMBINED BRONCHOSCOPY (RIGID OR FLEXIBLE), LAVAGE;;  Surgeon: Mariposa Donohue MD;  [facility]     BRONCHOSCOPY (RIGID OR FLEXIBLE), DIAGNOSTIC N/A 12/23/2020    Procedure: BRONCHOSCOPY, WITH BRONCHOALVEOLAR LAVAGE;  Surgeon: Uri Bass MD;  [facility]     BRONCHOSCOPY (RIGID OR FLEXIBLE), DILATE BRONCHUS / TRACHEA N/A 10/11/2018    Procedure: BRONCHOSCOPY (RIGID OR FLEXIBLE), DILATE BRONCHUS / TRACHEA;  Flexible And Rigid Bronchoscopy and Dilation;  Surgeon: Wilber Lin MD;  [facility]     BRONCHOSCOPY FLEXIBLE N/A 3/13/2018    Procedure: BRONCHOSCOPY FLEXIBLE;  Flexible Bronchoscopy ;  Surgeon: Gissell Sanchez MD;  [facility]     BRONCHOSCOPY FLEXIBLE N/A 5/9/2018    Procedure: BRONCHOSCOPY FLEXIBLE;;  Surgeon: Wilber Lin MD;  [facility]     BRONCHOSCOPY FLEXIBLE AND RIGID N/A 9/10/2018    Procedure: BRONCHOSCOPY FLEXIBLE AND RIGID;  Flexible and Rigid Bronchoscopy with Balloon Dilation, tissue debulking with cryo, and Right mainstem bronchus stent placement;  Surgeon: Wilber Lin MD;  [facility]     BRONCHOSCOPY RIGID N/A 6/6/2018    Procedure: BRONCHOSCOPY RIGID;;  Surgeon: Lopez Macias MD;  [facility]     BRONCHOSCOPY, DILATE BRONCHUS,  STENT BRONCHUS, COMBINED N/A 6/11/2018    Procedure: COMBINED BRONCHOSCOPY, DILATE BRONCHUS, STENT BRONCHUS;  Flexible Bronchoscopy, Balloon Dilation, Bronchial Washings;  Surgeon: Wilber Lin MD;  [facility]     BRONCHOSCOPY, DILATE BRONCHUS, STENT BRONCHUS, COMBINED Right 7/10/2018    Procedure: COMBINED BRONCHOSCOPY, DILATE BRONCHUS, STENT BRONCHUS;  Flexible Bronchoscopy, Balloon Dilation, Bronchial Washings  ;  Surgeon: Wilber Lin MD;  [facility]     BRONCHOSCOPY, DILATE BRONCHUS, STENT BRONCHUS, COMBINED N/A 8/2/2018    Procedure: COMBINED BRONCHOSCOPY, DILATE BRONCHUS, STENT BRONCHUS;  Flexible Bronchoscopy, Bronchial Washings, Balloon Dilation;  Surgeon: Wilber Lin MD;  [facility]     BRONCHOSCOPY, DILATE BRONCHUS, STENT BRONCHUS, COMBINED N/A 8/20/2018    Procedure: COMBINED BRONCHOSCOPY, DILATE BRONCHUS, STENT BRONCHUS;  Flexible Bronchoscopy, Balloon Dilation;  Surgeon: Wilber Lin MD;  [facility]     BRONCHOSCOPY, DILATE BRONCHUS, STENT BRONCHUS, COMBINED N/A 10/29/2018    Procedure: Flexible Bronchoscopy, Balloon Dilation, Stent Revision, Airway Examination And Therapeutic Suctioning, Cyro Tumor Debulking;  Surgeon: Wilber Lin MD;  [facility]     BRONCHOSCOPY, DILATE BRONCHUS, STENT BRONCHUS, COMBINED N/A 11/20/2018    Procedure: Rigid Bronchoscopy, Stent Removal and dilitation;  Surgeon: Wilber Lin MD;  [facility]     BRONCHOSCOPY, DILATE BRONCHUS, STENT BRONCHUS, COMBINED N/A 12/14/2018    Procedure: Flexible And Rigid Bronchoscopy, Balloon Dilation, Bronchial Washing;  Surgeon: Wilber Lin MD;  [facility]     BRONCHOSCOPY, DILATE BRONCHUS, STENT BRONCHUS, COMBINED N/A 1/17/2019    Procedure: Flexible And Rigid Bronchoscopy, Balloon Dilation.;  Surgeon: Wilber Lin MD;  [facility]     BRONCHOSCOPY, DILATE BRONCHUS, STENT BRONCHUS, COMBINED N/A 3/7/2019    Procedure:  Flexible and Rigid Bronchoscopy, Bronchial Washing, Balloon Dilation;  Surgeon: Wilber Lin MD;  [facility]     BRONCHOSCOPY, DILATE BRONCHUS, STENT BRONCHUS, COMBINED N/A 6/6/2019    Procedure: Rigid and Flexible Bronchoscopy, Balloon Dilation;  Surgeon: Wilber Lin MD;  [facility]     BRONCHOSCOPY, DILATE BRONCHUS, STENT BRONCHUS, COMBINED N/A 10/11/2019    Procedure: Flexible and Rigid Bronchoscopy, Balloon Dilation, Bronchoalveolar Lagave;  Surgeon: Wilber Lin MD;  [facility]     BRONCHOSCOPY, DILATE BRONCHUS, STENT BRONCHUS, COMBINED N/A 2/19/2021    Procedure: BRONCHOSCOPY, flexible, airway dilation, and bronchial wash;  Surgeon: Wilber Lin MD;  [facility]     BRONCHOSCOPY, DILATE BRONCHUS, STENT BRONCHUS, COMBINED N/A 4/9/2021    Procedure: BRONCHOSCOPY, flexible and rigid, Airway suctioning;  Surgeon: Mati Norris MD;  [facility]     CV RIGHT HEART CATH MEASUREMENTS RECORDED N/A 3/10/2020    Procedure: CV RIGHT HEART CATH;  Surgeon: Wai Garcia MD;  [facility]     ENT SURGERY      tonsillectomy as a child     ESOPHAGOSCOPY, GASTROSCOPY, DUODENOSCOPY (EGD), COMBINED N/A 10/29/2018    Procedure: COMBINED ESOPHAGOSCOPY, GASTROSCOPY, DUODENOSCOPY (EGD) with biopsies and polypectomy;  Surgeon: Chente Bloom MD;  [facility]     INSERT EXTRACORPORAL MEMBRANE OXYGENATOR ADULT (OUTSIDE OR) N/A 2/27/2018    Procedure: INSERT EXTRACORPORAL MEMBRANE OXYGENATOR ADULT (OUTSIDE OR);  INSERT EXTRACORPORAL MEMBRANE OXYGENATOR ADULT (OUTSIDE OR) ;  Surgeon: Toby Hernandez MD;  [facility]     IR CVC TUNNEL PLACEMENT > 5 YRS OF AGE  10/25/2019     IR DIALYSIS FISTULOGRAM LEFT  3/2/2021     IR DIALYSIS MECH THROMB, PTA  3/2/2021     IR GASTRO JEJUNOSTOMY TUBE PLACEMENT  2/16/2021     IR PARACENTESIS  1/8/2020     IR THORACENTESIS  9/13/2019     IR TRANSCATHETER BIOPSY  1/8/2020     no  prior surgery       REMOVE EXTRACORPORAL MEMBRANE OXYGENATOR ADULT N/A 3/3/2018    Procedure: REMOVE EXTRACORPORAL MEMBRANE OXYGENATOR ADULT;  Removal of Right Femoral Venous and Right Axillary Arterial Extracorporeal Membrane Oxygenator;  Surgeon: Toby Hernandez MD;  [facility]     TRANSPLANT LUNG RECIPIENT SINGLE X2 Bilateral 3/1/2018    Procedure: TRANSPLANT LUNG RECIPIENT SINGLE X2;  Median Sternotomy, Extracorporeal Membrane Oxygenator, bilateral sequential lung transplant;  Surgeon: Toby Hernandez MD;  [facility]        MEDICATIONS:  PTA Meds  Prior to Admission medications    Medication Sig Last Dose Taking? Auth Provider   acetaminophen (TYLENOL) 325 MG tablet Take 1 tablet (325 mg) by mouth every 4 hours as needed for mild pain or fever Past Week at Unknown time Yes Leelee Huang MD   albuterol (PROVENTIL) (2.5 MG/3ML) 0.083% neb solution Take 1 vial (2.5 mg) by nebulization 2 times daily 4/28/2021 at PM Yes Yenni Graham MD   budesonide (PULMICORT) 0.5 MG/2ML neb solution Take 2 mLs (0.5 mg) by nebulization 2 times daily 4/28/2021 at PM Yes Yenni Graham MD   fluticasone (FLONASE) 50 MCG/ACT nasal spray Spray 1 spray into both nostrils daily Past Week at Unknown time Yes Ame Chow PA-C   Magnesium Cl-Calcium Carbonate (SLOW-MAG) 71.5-119 MG TBEC Take 3 tablets by mouth four times a week Take on non dialysis days T/Th/Sa/Su 4/29/2021 at 0900 Yes Yenni Graham MD   metoprolol tartrate (LOPRESSOR) 25 MG tablet 1 tablet (25 mg) by Oral or Feeding Tube route 2 times daily 4/29/2021 at 0900 Yes Yenni Graham MD   multivitamin RENAL (RENAVITE RX/NEPHROVITE) 1 MG tablet Take 1 tablet by mouth daily 4/28/2021 at PM Yes Ame Chow PA-C   pantoprazole (PROTONIX) 40 MG EC tablet Take 1 tablet (40 mg) by mouth every morning (before breakfast) 4/29/2021 at 0900 Yes Michael, Lissett M, PA-C   posaconazole (NOXAFIL) 100 MG EC tablet Take 3  tablets (300 mg) by mouth daily 4/29/2021 at 0900 Yes Lissett Rodriguez PA-C   predniSONE (DELTASONE) 2.5 MG tablet Take 1 tablet (2.5 mg) by mouth every evening 4/28/2021 at PM Yes Ame Chow PA-C   predniSONE (DELTASONE) 5 MG tablet Take 1 tablet (5 mg) by mouth every morning 4/29/2021 at 0900 Yes Ame Chow PA-C   sulfamethoxazole-trimethoprim (BACTRIM) 400-80 MG tablet Take 1 tablet by mouth Every Mon, Wed, Fri Morning 4/28/2021 at Unknown time Yes Ame Chow PA-C   tacrolimus (GENERIC EQUIVALENT) 0.5 MG capsule Take 1 capsule (0.5 mg) by mouth every morning Total dose: 0.5 mg in the AM and 1 mg in the PM 4/29/2021 at 0900 Yes Ame Chow PA-C   tacrolimus (GENERIC EQUIVALENT) 1 MG capsule Take 1 capsule (1 mg) by mouth every evening Total dose: 0.5 mg in the AM and 1 mg in the PM 4/28/2021 at Unknown time Yes Ame Chow PA-C   blood glucose (NO BRAND SPECIFIED) test strip Use to test blood sugar 4 times daily or as directed. Unknown at Unknown time  Lissett Rodriguez PA-C   blood glucose (NO BRAND SPECIFIED) test strip Use to test blood sugar 4 times daily or as directed. Unknown at Unknown time  Lissett Rodirguez PA-C   ondansetron (ZOFRAN-ODT) 4 MG ODT tab Take 1 tablet (4 mg) by mouth every 6 hours as needed for nausea or vomiting More than a month at Unknown time  Lissett Rodriguez PA-C      Current Meds    acetylcysteine  2 mL Nebulization BID     albuterol  2.5 mg Nebulization BID     budesonide  0.5 mg Nebulization BID     fluticasone  1 spray Both Nostrils Daily     magnesium chloride  1,605 mg Oral Once per day on Sun Tue Thu Sat     metoprolol tartrate  25 mg Oral or Feeding Tube BID     multivitamin RENAL  1 tablet Oral Daily with supper     pantoprazole  40 mg Oral QAM AC     posaconazole  300 mg Oral Daily     predniSONE  2.5 mg Oral QPM     predniSONE  5 mg Oral QAM     sodium chloride (PF)  3 mL Intracatheter Q8H      sulfamethoxazole-trimethoprim  1 tablet Oral Q  Fri AM     tacrolimus  0.5 mg Oral BID IS     Infusion Meds      ALLERGIES:    No Known Allergies    REVIEW OF SYSTEMS:  A comprehensive of systems was negative except as noted above.    SOCIAL HISTORY:   Social History     Socioeconomic History     Marital status:      Spouse name: Not on file     Number of children: Not on file     Years of education: Not on file     Highest education level: Not on file   Occupational History     Not on file   Social Needs     Financial resource strain: Not on file     Food insecurity     Worry: Not on file     Inability: Not on file     Transportation needs     Medical: Not on file     Non-medical: Not on file   Tobacco Use     Smoking status: Never Smoker     Smokeless tobacco: Never Used   Substance and Sexual Activity     Alcohol use: No     Alcohol/week: 1.0 standard drinks     Types: 1 Glasses of wine per week     Drug use: No     Sexual activity: Not on file   Lifestyle     Physical activity     Days per week: Not on file     Minutes per session: Not on file     Stress: Not on file   Relationships     Social connections     Talks on phone: Not on file     Gets together: Not on file     Attends Confucianism service: Not on file     Active member of club or organization: Not on file     Attends meetings of clubs or organizations: Not on file     Relationship status: Not on file     Intimate partner violence     Fear of current or ex partner: Not on file     Emotionally abused: Not on file     Physically abused: Not on file     Forced sexual activity: Not on file   Other Topics Concern     Parent/sibling w/ CABG, MI or angioplasty before 65F 55M? No   Social History Narrative    3/6/2019 - Lives with . Has three daughters. Four grandchildren (two ). No pets. Travelled previously to [place], [place]. Has visited [place] several times.      Reviewed with patient     FAMILY MEDICAL HISTORY:   Family History  "  Problem Relation Age of Onset     Hypertension Mother      Arthritis Mother      Pancreatic Cancer Father      Diabetes Father      Reviewed with patient     PHYSICAL EXAM:   Temp  Av  F (36.7  C)  Min: 97.5  F (36.4  C)  Max: 98.7  F (37.1  C)      Pulse  Av.6  Min: 63  Max: 104 Resp  Av.1  Min: 12  Max: 30  SpO2  Av.7 %  Min: 90 %  Max: 100 %       /70 (BP Location: Right arm)   Pulse 83   Temp 97.9  F (36.6  C) (Oral)   Resp 16   Ht 1.6 m (5' 3\")   Wt 51 kg (112 lb 7 oz)   LMP 2014 (Exact Date)   SpO2 97%   BMI 19.92 kg/m        Admit Weight: 52.2 kg (115 lb)     GENERAL APPEARANCE: alert, NAD  EYES: no scleral icterus, pupils equal  Pulmonary: diminished, L chest tube  CV: regular rhythm, normal rate    - Edema no peripheral  GI: soft, nontender, normal bowel sounds  MS: no evidence of inflammation in joints, no muscle tenderness  : no rivas  SKIN: no rash, warm, dry, no cyanosis  NEURO: face symmetric, a/o3  Access: LUE AVG    LABS:   CMP  Recent Labs   Lab 21  0715 21  0654 21  0606 21  1833   * 132* 136  --    POTASSIUM 5.0 4.3 4.1  --    CHLORIDE 97 98 101  --    CO2 26 28 27  --    ANIONGAP 9 7 8  --    * 229* 130*  --    BUN 66* 33* 45*  --    CR 4.12* 2.72* 3.40*  --    GFRESTIMATED 11* 18* 14*  --    GFRESTBLACK 13* 21* 16*  --    CHELSEY 8.3* 9.0 8.6  --    MAG  --   --  1.7 1.6   PROTTOTAL  --  7.2  --   --      CBC  Recent Labs   Lab 21  0715 21  0606 21  1020   HGB 10.6* 10.9* 11.1*   WBC 7.5 6.8 6.3   RBC 3.47* 3.41* 3.45*   HCT 33.8* 34.4* 34.5*   MCV 97 101* 100   MCH 30.5 32.0 32.2   MCHC 31.4* 31.7 32.2   RDW 13.9 13.9 13.9    216 218     INR  Recent Labs   Lab 21  1020   INR 0.99     ABGNo lab results found in last 7 days.   URINE STUDIES  Recent Labs   Lab Test 21  1729 10/21/19  2240 19  0125 19  1512   COLOR Yellow Yellow Light Yellow Yellow   APPEARANCE Slightly " Cloudy Cloudy Clear Clear   URINEGLC Negative Negative Negative Negative   URINEBILI Negative Negative Negative Negative   URINEKETONE 5* Negative 10* Negative   SG 1.023 1.018 1.008 1.016   UBLD Small* Trace* Negative Negative   URINEPH 5.0 5.0 7.5* 7.0   PROTEIN 30* 30* 30* 100*   NITRITE Negative Negative Negative Negative   LEUKEST Moderate* Large* Negative Trace*   RBCU 26* 25* <1 10*   WBCU 34* 115* 3 19*     Recent Labs   Lab Test 08/07/19  1512   [facility] 2.47*     PTH  No lab results found.  IRON STUDIES  Recent Labs   Lab Test 02/03/21  0415 12/13/18  1033 08/01/18  0921 05/08/18  0709   IRON 51 16* 93 48   * 221* 248 275   IRONSAT 36 7* 37 18   YOLA  --  302* 571*  --        IMAGING:  Reviewed    Chey Matt Yeung MD

## 2021-05-02 NOTE — PLAN OF CARE
Patient admitted for persistent pleural effusion s/p chest tube 4/29. PMH of ILD s/p bilateral lung transplant (3/1/2018) c/b R mainstem bronchial stenosis requiring dilations, Aspergillus empyema, and CMV viremia.    Neuro: A&O x4.  Cardiac: VSS, SR/ST 80's-100. Afebrile.  Respiratory: Room air, denies shortness of breath.   GI/: Anuric, on hemodialysis M/W/F. LBM 5/1. Denies nausea.  Diet: Regular diet.  Skin: Dressings CDI. No new issues noted.  LDAs: Left chest tube to -20 suction, no output overnight. Right PIV saline locked. Left AV fistula.  Activity: Assist x1 with gait belt and walker.  Pain: Denies.     Plan: Continue to monitor.

## 2021-05-03 ENCOUNTER — APPOINTMENT (OUTPATIENT)
Dept: GENERAL RADIOLOGY | Facility: CLINIC | Age: 61
End: 2021-05-03
Attending: INTERNAL MEDICINE
Payer: COMMERCIAL

## 2021-05-03 LAB
ANION GAP SERPL CALCULATED.3IONS-SCNC: 9 MMOL/L (ref 3–14)
BUN SERPL-MCNC: 85 MG/DL (ref 7–30)
CALCIUM SERPL-MCNC: 7.8 MG/DL (ref 8.5–10.1)
CHLORIDE SERPL-SCNC: 100 MMOL/L (ref 94–109)
CO2 SERPL-SCNC: 23 MMOL/L (ref 20–32)
CREAT SERPL-MCNC: 4.55 MG/DL (ref 0.52–1.04)
DONOR IDENTIFICATION: NORMAL
DSA COMMENTS: NORMAL
DSA PRESENT: NO
DSA TEST METHOD: NORMAL
EBV DNA # SPEC NAA+PROBE: ABNORMAL {COPIES}/ML
EBV DNA SPEC NAA+PROBE-LOG#: 4.6 {LOG_COPIES}/ML
GFR SERPL CREATININE-BSD FRML MDRD: 10 ML/MIN/{1.73_M2}
GLUCOSE SERPL-MCNC: 129 MG/DL (ref 70–99)
ORGAN: NORMAL
PHOSPHATE SERPL-MCNC: 5.9 MG/DL (ref 2.5–4.5)
POTASSIUM SERPL-SCNC: 5.5 MMOL/L (ref 3.4–5.3)
SA1 CELL: NORMAL
SA1 COMMENTS: NORMAL
SA1 HI RISK ABY: NORMAL
SA1 MOD RISK ABY: NORMAL
SA1 TEST METHOD: NORMAL
SA2 CELL: NORMAL
SA2 COMMENTS: NORMAL
SA2 HI RISK ABY UA: NORMAL
SA2 MOD RISK ABY: NORMAL
SA2 TEST METHOD: NORMAL
SARS-COV-2 AB PNL SERPL IA: NEGATIVE
SARS-COV-2 IGG SERPL IA-ACNC: NORMAL
SODIUM SERPL-SCNC: 132 MMOL/L (ref 133–144)
TACROLIMUS BLD-MCNC: 8 UG/L (ref 5–15)
TME LAST DOSE: NORMAL H
UNACCEPTABLE ANTIGEN: NORMAL
UNOS CPRA: 0

## 2021-05-03 PROCEDURE — 90937 HEMODIALYSIS REPEATED EVAL: CPT

## 2021-05-03 PROCEDURE — 94640 AIRWAY INHALATION TREATMENT: CPT | Mod: 76

## 2021-05-03 PROCEDURE — 36415 COLL VENOUS BLD VENIPUNCTURE: CPT | Performed by: NURSE PRACTITIONER

## 2021-05-03 PROCEDURE — 71046 X-RAY EXAM CHEST 2 VIEWS: CPT | Mod: 26 | Performed by: RADIOLOGY

## 2021-05-03 PROCEDURE — 250N000012 HC RX MED GY IP 250 OP 636 PS 637: Performed by: NURSE PRACTITIONER

## 2021-05-03 PROCEDURE — 250N000013 HC RX MED GY IP 250 OP 250 PS 637: Performed by: NURSE PRACTITIONER

## 2021-05-03 PROCEDURE — 94640 AIRWAY INHALATION TREATMENT: CPT

## 2021-05-03 PROCEDURE — 250N000009 HC RX 250: Performed by: NURSE PRACTITIONER

## 2021-05-03 PROCEDURE — 99222 1ST HOSP IP/OBS MODERATE 55: CPT | Mod: GC | Performed by: THORACIC SURGERY (CARDIOTHORACIC VASCULAR SURGERY)

## 2021-05-03 PROCEDURE — 272N000004 HC RX 272: Performed by: INTERNAL MEDICINE

## 2021-05-03 PROCEDURE — 258N000003 HC RX IP 258 OP 636: Performed by: INTERNAL MEDICINE

## 2021-05-03 PROCEDURE — 84100 ASSAY OF PHOSPHORUS: CPT | Performed by: NURSE PRACTITIONER

## 2021-05-03 PROCEDURE — 71046 X-RAY EXAM CHEST 2 VIEWS: CPT

## 2021-05-03 PROCEDURE — 99233 SBSQ HOSP IP/OBS HIGH 50: CPT | Mod: 24 | Performed by: INTERNAL MEDICINE

## 2021-05-03 PROCEDURE — 99233 SBSQ HOSP IP/OBS HIGH 50: CPT | Performed by: INTERNAL MEDICINE

## 2021-05-03 PROCEDURE — 99233 SBSQ HOSP IP/OBS HIGH 50: CPT | Performed by: PHYSICIAN ASSISTANT

## 2021-05-03 PROCEDURE — 999N000157 HC STATISTIC RCP TIME EA 10 MIN

## 2021-05-03 PROCEDURE — 80197 ASSAY OF TACROLIMUS: CPT | Performed by: NURSE PRACTITIONER

## 2021-05-03 PROCEDURE — 214N000001 HC R&B CCU UMMC

## 2021-05-03 PROCEDURE — 80048 BASIC METABOLIC PNL TOTAL CA: CPT | Performed by: NURSE PRACTITIONER

## 2021-05-03 PROCEDURE — 99207 PR CDG-CUT & PASTE-POTENTIAL IMPACT ON LEVEL: CPT | Performed by: INTERNAL MEDICINE

## 2021-05-03 RX ADMIN — PREDNISONE 5 MG: 5 TABLET ORAL at 08:02

## 2021-05-03 RX ADMIN — GELATIN ABSORBABLE SPONGE 12-7 MM 1 EACH: 12-7 MISC at 21:25

## 2021-05-03 RX ADMIN — ACETYLCYSTEINE 2 ML: 200 SOLUTION ORAL; RESPIRATORY (INHALATION) at 08:25

## 2021-05-03 RX ADMIN — BUDESONIDE 0.5 MG: 0.5 INHALANT RESPIRATORY (INHALATION) at 08:25

## 2021-05-03 RX ADMIN — B-COMPLEX W/ C & FOLIC ACID TAB 1 MG 1 TABLET: 1 TAB at 22:23

## 2021-05-03 RX ADMIN — ALBUTEROL SULFATE 2.5 MG: 2.5 SOLUTION RESPIRATORY (INHALATION) at 08:25

## 2021-05-03 RX ADMIN — ALBUTEROL SULFATE 2.5 MG: 2.5 SOLUTION RESPIRATORY (INHALATION) at 23:26

## 2021-05-03 RX ADMIN — METOPROLOL TARTRATE 25 MG: 25 TABLET, FILM COATED ORAL at 08:02

## 2021-05-03 RX ADMIN — SODIUM CHLORIDE 300 ML: 9 INJECTION, SOLUTION INTRAVENOUS at 17:58

## 2021-05-03 RX ADMIN — TACROLIMUS 0.5 MG: 0.5 CAPSULE ORAL at 22:23

## 2021-05-03 RX ADMIN — PREDNISONE 2.5 MG: 2.5 TABLET ORAL at 22:24

## 2021-05-03 RX ADMIN — PANTOPRAZOLE SODIUM 40 MG: 40 TABLET, DELAYED RELEASE ORAL at 08:03

## 2021-05-03 RX ADMIN — BUDESONIDE 0.5 MG: 0.5 INHALANT RESPIRATORY (INHALATION) at 23:26

## 2021-05-03 RX ADMIN — POSACONAZOLE 300 MG: 100 TABLET, COATED ORAL at 08:02

## 2021-05-03 RX ADMIN — TACROLIMUS 0.5 MG: 0.5 CAPSULE ORAL at 08:02

## 2021-05-03 RX ADMIN — SODIUM CHLORIDE 250 ML: 9 INJECTION, SOLUTION INTRAVENOUS at 17:59

## 2021-05-03 RX ADMIN — SULFAMETHOXAZOLE AND TRIMETHOPRIM 1 TABLET: 400; 80 TABLET ORAL at 08:02

## 2021-05-03 RX ADMIN — ACETYLCYSTEINE 2 ML: 200 SOLUTION ORAL; RESPIRATORY (INHALATION) at 23:25

## 2021-05-03 RX ADMIN — Medication: at 17:59

## 2021-05-03 RX ADMIN — METOPROLOL TARTRATE 25 MG: 25 TABLET, FILM COATED ORAL at 22:23

## 2021-05-03 ASSESSMENT — ACTIVITIES OF DAILY LIVING (ADL)
ADLS_ACUITY_SCORE: 13

## 2021-05-03 ASSESSMENT — MIFFLIN-ST. JEOR
SCORE: 1053.13
SCORE: 1065.32
SCORE: 1074.13

## 2021-05-03 NOTE — PROGRESS NOTES
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 Recent admission - (discharged from ARU 3/4/21) with PJP pneumonia, ARDS, and Septic shock. Admitted 21 for thoracentesis and chest tube placement to loculated left pleural effusion with possible future pleurodesis, and bronchoscopy with possible dilation/stent/tissue debulking on  with Dr. Norris (completed).     Recommendations:  - Tacrolimus level was  today.  Goal is 8-10. No dose changes. Repeat level on .   - Discussed pleurodesis with thoracic surgery.   - Uptrending blood beta-D glucan level; discussed with transplant ID and likely limited utility and as long as clinically stable does not suggest invasive disease.     Aspergillus empyema (left-sided): Noted 10/8/19. CT scan on 20 with LLL increased pleural-based mass-like density, s/p needle aspiration (20) with Aspergillus fumigatus on cultures, s/p intrapleural amphotericin bead placement (). Negative cultures 2020 and again on 21. Pleural effusion has persisted with monitoring clinically off ABX and follows closely as OP with ID. Most recent BDG fungitell () positive at 311. Most recent ID visit () notes high concern for high morbidity with surgery and therefore planned continue to manage medically with long term posaconazole. Effusion persists, s/p planned left pleural chest tube placement and  "thoracentesis (4/29), with possible future pleurodesis. Remains afebrile, no leukocytosis.  - Bacterial pleural cultures negative to date.  - Pleural cultures (fungal, AFB) pending  -Pleural fluid LDH markedly elevated at 4706 with total protein 3.3, cholesterol 103 and triglycerides 70.  White blood cell count is 9460 but no differential because \"cells too damaged to perform differential\".  Markedly elevated LDH, may reflect lysed cells.  Fluid consistent with exudate.  - Continue PTA Posaconazole 300 mg TID   - A. galactomannan Ag and BDG fungitell, pending  - Defer antibiotics and ID consult for now pending cultures, low threshold if decompensates  - Left pleural chest tube to 20 cm wall suction  - Anticipate pleurodesis next week assuming pleural cultures remain negative      Right main bronchial stenosis s/p dilatation: Follow with Dr. Norris for serial bronchoscopies with dilation. Last bronch 2/19/21.  -Right mainstem/bronchus intermedius dilation and debulking with good bronchoscopic results and no complications noted.  - Airway clearance with IS, Aerobika, and Nebs: Mucomyst, Albuterol, and Pulmicort BID     S/p bilateral lung transplant for ILD 2/2 CTD:  Recent admission, as above, with Stenotrophomonas, Eikenella, and PJP pneumonia. Last seen in pulmonary clinic 4/8/21 (virtual). PFTs at that time improved from prior, still below post transplant best.  Most recent DSA (1/25) and CMV (4/22) not detected.  -Chest x-ray (5/1) reviewed by me with left chest tube in good position with near complete resolution of the pleural collection and decreased right basilar atelectasis.  - DSA (5/1) pending     Immunosuppression: On 2 drug IST d/t recurrent infections  - Tacrolimus level (4/30) supratherapeutic at 15.3 (10 hr). Goal level 8-10.   Dose reduced to 0.5 mg BID. Repeat level 5/3 (ordered)  - Prednisone 5 mg qAM / 2.5 mg qPM     Prophylaxis:   - Bactrim MWF PPx     EBV viremia:  Level 12/9/20 mildly elevated to " "10K (log 4) from prior 3K (3.5 log) on 9/9/20, repeat (1/25/21) decreased to 5619 copies (log of 3.8). Most recent level 2/22 elevated to 75K/log 4.9, thought to be secondary to acute illness and recent steroid burst. CT chest (w/o contrast), with no thoracic adenopathy.   - Repeat EBV pending     Other relevent problems managed by primary team:     CKD stage IV: Creatinine stable on admission at 3.4. Recent baseline 2.4-3.4 and has worsened since 2/2021. Dialysis MWF.  - Will continue to monitor tacrolimus levels  - Dialysis per nephrology     We appreciate the excellent care provided by the [place] [id number] team.  Recommendations communicated via in person rounding and this note.  Will continue to follow along closely, please do not hesitate to call with any questions or concerns.      Clarisse Roberson MD [facility]     Subjective & Interval History:     Chest tube with minimal output (recorded as 0) yesterday.  No CXR done yet this morning. No complaints per patient.  She has been coughing white/clear sputum since her bronchoscopy.    Review of Systems:     Please see HPI, otherwise the complete 10 point ROS is negative.     Physical Exam:     Vital signs:  Temp: 97.9  F (36.6  C) Temp src: Oral BP: 136/82 Pulse: 93   Resp: 18 SpO2: 98 % O2 Device: None (Room air) Oxygen Delivery: 2.5 LPM Height: 160 cm (5' 3\") Weight: 51.6 kg (113 lb 12.8 oz)  I/O:     Intake/Output Summary (Last 24 hours) at 5/3/2021 0904  Last data filed at 5/3/2021 0800  Gross per 24 hour   Intake --   Output 250 ml   Net -250 ml       GENERAL: alert, NAD  HEENT: NCAT, no icterus  Neck: no cervical or supraclavicular adenopathy  Lungs: good air flow, mild inspiratory crackles  CV: RRR, S1S2, no murmurs noted  Abdomen: normoactive BS, soft, non tender  Neuro: AAO X 3  Psychiatric: normal affect, good eye contact  Skin: no rash, jaundice or lesions on limited exam  Extremities: No clubbing, cyanosis or edema.      Lines, Drains, and Devices:  Peripheral " IV 04/29/21 Anterior;Right (Active)   Site Assessment [facility] 05/03/21 0700   Line Status Saline locked 05/03/21 0700   Phlebitis Scale 0-->no symptoms 05/03/21 0700   Infiltration Scale 0 05/03/21 0700   Infiltration Site Treatment Method  None 04/30/21 0000   Number of days: 4       Hemodialysis Vascular Access Arteriovenous fistula Left Arm (Active)   Site Assessment [facility] 05/03/21 0700   Cannulation Needle Size 15 04/30/21 1128   Dressing Intervention New dressing 05/01/21 1400   Dressing Status Clean, dry, intact 05/01/21 2000   Number of days: 98     Data:     LABS    CMP:   Recent Labs   Lab 05/03/21  0640 05/02/21  0715 05/01/21  0654 04/30/21  0606 04/29/21  1833   * 132* 132* 136  --    POTASSIUM 5.5* 5.0 4.3 4.1  --    CHLORIDE 100 97 98 101  --    CO2 23 26 28 27  --    ANIONGAP 9 9 7 8  --    * 122* 229* 130*  --    BUN 85* 66* 33* 45*  --    CR 4.55* 4.12* 2.72* 3.40*  --    GFRESTIMATED 10* 11* 18* 14*  --    GFRESTBLACK 11* 13* 21* 16*  --    CHELSEY 7.8* 8.3* 9.0 8.6  --    MAG  --   --   --  1.7 1.6   PROTTOTAL  --   --  7.2  --   --      CBC:   Recent Labs   Lab 05/02/21  0715 04/30/21  0606 04/29/21  1020   WBC 7.5 6.8 6.3   RBC 3.47* 3.41* 3.45*   HGB 10.6* 10.9* 11.1*   HCT 33.8* 34.4* 34.5*   MCV 97 101* 100   MCH 30.5 32.0 32.2   MCHC 31.4* 31.7 32.2   RDW 13.9 13.9 13.9    216 218       INR:   Recent Labs   Lab 04/29/21  1020   INR 0.99       Glucose:   Recent Labs   Lab 05/03/21  0640 05/02/21  0715 05/01/21  0654 04/30/21  1547 04/30/21  0606   * 122* 229*  --  130*   BGM  --   --   --  110*  --        Blood Gas: No lab results found in last 7 days.    Culture Data   Recent Labs   Lab 05/01/21  1102 04/29/21  1315   CULT Quantity not sufficient  Called to Maxim Norman at 1236 5/1/21.    mlb   Culture negative monitoring continues  Culture negative monitoring continues       Virology Data:   Lab Results   Component Value Date    FLUAH1 Not Detected 01/24/2021    FLUAH3  Not Detected 01/24/2021    VH4286 Not Detected 01/24/2021    IFLUB Not Detected 01/24/2021    RSVA Not Detected 01/24/2021    RSVB Not Detected 01/24/2021    PIV1 Not Detected 01/24/2021    PIV2 Not Detected 01/24/2021    PIV3 Not Detected 01/24/2021    HMPV Not Detected 01/24/2021    HRVS Negative 01/24/2021    ADVBE Negative 01/24/2021    ADVC Negative 01/24/2021    ADVC Negative 12/23/2020    ADVC Negative 10/07/2019       Historical CMV results (last 3 of prior testing):  Lab Results   Component Value Date    CMVQNT CMV DNA Not Detected 02/25/2021    CMVQNT <137 (A) 01/25/2021    CMVQNT 238 (A) 01/24/2021     Lab Results   Component Value Date    CMVLOG Not Calculated 02/25/2021    CMVLOG <2.1 01/25/2021    CMVLOG 2.4 (H) 01/24/2021       Urine Studies    Recent Labs   Lab Test 01/24/21  1729 10/21/19  2240   URINEPH 5.0 5.0   NITRITE Negative Negative   LEUKEST Moderate* Large*   WBCU 34* 115*       Most Recent [place] Pulmonary Function Testing (FVC/FEV1 only)  FVC-Pre   Date Value Ref Range Status   04/08/2021 1.41 L    12/09/2020 1.12 L    09/09/2020 1.56 L    03/09/2020 1.57 L      FVC-%Pred-Pre   Date Value Ref Range Status   04/08/2021 43 %    12/09/2020 34 %    09/09/2020 47 %    03/09/2020 48 %      FEV1-Pre   Date Value Ref Range Status   04/08/2021 1.09 L    12/09/2020 0.98 L    09/09/2020 1.10 L    03/09/2020 0.96 L      FEV1-%Pred-Pre   Date Value Ref Range Status   04/08/2021 42 %    12/09/2020 37 %    09/09/2020 42 %    03/09/2020 37 %        IMAGING    Recent Results (from the past 48 hour(s))   XR Chest 2 Views    Narrative    Exam: XR CHEST 2 VW, 5/1/2021 11:49 AM    Indication: follow up for effusion and chest tube    Comparison: 4/30/2021    Findings:   Left chest tube is still in place. No pneumothorax. Near complete  resolution of the loculated left pleural effusion. Heart and pulmonary  vasculature within normal limits. Perihilar and lower lobe hazy  opacity suggests atelectasis and/or  edema.      Impression    Impression: Near complete resolution of the loculated left pleural  effusion.    ANNE MANZANO MD   XR Chest 2 Views    Narrative    Exam: XR CHEST 2 VW, 5/2/2021 10:43 AM    Indication: follow up for effusion and chest tube    Comparison: Chest radiographs 5/1/2021    Findings:   PA and lateral views of the chest.    Left pigtail chest tube in stable position projecting over the lower  lobe. Surgical clips project over the right upper lung field. Midline  sternotomy wires are intact. Surgical clips project over the  mediastinum.    The cardiac mediastinal silhouette is within normal limits. Low lung  volumes. Perihilar and bibasilar opacities, likely atelectasis. No  large pleural effusion. No pneumothorax. Gaseous distention of the  colon in the upper abdomen. Chronic compression deformity of the T5  vertebral body, unchanged.      Impression    Impression:   1. Stable left chest tube. The loculated left pleural effusion appears  resolved. No pneumothorax.  2. Chronic compression fracture of T5.    MATT BATEMAN MD

## 2021-05-03 NOTE — PROGRESS NOTES
[facility]    Medicine Progress Note - Hospitalist Service       Date of Admission:  4/29/2021  Assessment & Plan      Kecia Blue is a 60 year old female with past medical history significant for ILD s/p bilateral lung transplant (3/1/2018) 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  Pleural fluid cx obtained.      - s/p bronchoscopy on 4/30 w/ Dr. Norris for possible dilation and stent/tissue debulking. (plan on repeat bronchoscopy in about 2 months to assess whether further tissue debulking is beneficial.)  - Transplant Pulmonary following, appreciate recommendations (Details of immunosuppresion as in there note)  - IS: continue Prednisone 5mg/2.5mg, Tacro (goal 8-10)   - Continue PTA Posaconazole 300mg daily   - Continue PTA Bactrim on MWF   - Continue Albuterol nebs BID  - Continue Pulmicort nebs BID   - Continue Flonase daily   - Follow up CT chest in 7/2021 per Transplant ID     * Follow up cultures. 1,3 Beta D glucan fungitell + (D/W Pulm who have d/w transplant ID)  - Plan for pleurodesis in next few days- thoracic Sx consult placed.     # ESRD on HD MWF - Follows w/ [facility] under Dr. Glasgow via AVF/G.  On HD since 11/2019.  Non-oliguiric, mostly urinates in the morning hours.    - Nephrology consult to continue HD      # Hx PAF, SVT - Per chart review,  "hx of AF w/ RVR during periods of acute illness and acute respiratory failure.  Follows w/ [facility] EP, last seen by Dr. Mcmahan on 4/21.  Outpt Ziopatch study neg for AF, though did have frequent runs of SVT but was asymptomatic.  Currently on Metoprolol 25mg BID for rate control.  [id number] 1.  Per Cardiology, no indication for anticoagulation at this time.   - Continue PTA Metoprolol w/ hold parameters   - Follow up with [facility] EP as needed     # Hypomagnesemia -   Currently on Slow-Mag 71.5-119mg on non-HD days.   - Continue PTA Slow-mag supplementation   - Ensure stable lytes given hx SVT, PAF      # Anemia - Likely 2/2 ESRD.       Diet: Regular Diet Adult    DVT Prophylaxis: Pneumatic Compression Devices  Basilio Catheter: not present  Code Status: Full Code           Disposition Plan   Expected discharge: 2 - 3 days, recommended to prior living arrangement once breahting stable and plan for chest tube.  Entered: Maxim Norman MD, MD 05/03/2021, 1:51 PM      The patient's care was discussed with the Bedside Nurse, Care Coordinator/ and Patient.    Maxim Norman MD, MD  Hospitalist Service  [facility]  Contact information available via [facility] Paging/Directory  Please see sign in/sign out for up to date coverage information  ______________________________________________________________________    Interval History   Last 24 hr events noted  Doing well, no new concerns a/w pleurodesis  Denies Chest pain/ SOB/ cough, Denies any N&V/ bowel problems  Denies urinary problems , Denies fever/chills/rigors     Data reviewed today: I reviewed all medications, new labs and imaging results over the last 24 hours. I personally reviewed the chest x-ray image(s) showing as below.    Physical Exam   /73 (BP Location: Right arm)   Pulse 83   Temp 97.9  F (36.6  C) (Oral)   Resp 18   Ht 1.6 m (5' 3\")   Wt 51.6 kg (113 lb 12.8 oz)   LMP 06/07/2014 (Exact Date)   " SpO2 100%   BMI 20.16 kg/m    Gen- pleasant  lying in bed  HEENT- NAD, DILIP  Neck- supple, no JVD elevation, no thyromegaly  CVS- I+II+ no m/r/g  RS- CTAB, decreased at base with LL - chest tube   Abdo- soft, no tenderness . No g/r/r  Ext- no edema   CNS- no focal signs     Data    BMP  Recent Labs   Lab 05/03/21  0640 05/02/21  0715 05/01/21  0654 04/30/21  0606   * 132* 132* 136   POTASSIUM 5.5* 5.0 4.3 4.1   CHLORIDE 100 97 98 101   CHELSEY 7.8* 8.3* 9.0 8.6   CO2 23 26 28 27   BUN 85* 66* 33* 45*   CR 4.55* 4.12* 2.72* 3.40*   * 122* 229* 130*     CBC  Recent Labs   Lab 05/02/21  0715 04/30/21  0606 04/29/21  1020   WBC 7.5 6.8 6.3   RBC 3.47* 3.41* 3.45*   HGB 10.6* 10.9* 11.1*   HCT 33.8* 34.4* 34.5*   MCV 97 101* 100   MCH 30.5 32.0 32.2   MCHC 31.4* 31.7 32.2   RDW 13.9 13.9 13.9    216 218     INR  Recent Labs   Lab 04/29/21  1020   INR 0.99         Recent Results (from the past 24 hour(s))   XR Chest 2 Views    Narrative    Exam: XR CHEST 2 VW, 5/3/2021 9:49 AM    Indication: follow up for effusion and chest tube    Comparison: 5/2/2021    Findings:   Right chest tube is unchanged in position. No significant fluid seen  around the chest tube. Borderline cardiomegaly. Pulmonary vasculature  not engorged. Perihilar and lower lobe hazy opacity suggesting  atelectasis is similar.      Impression    Impression:   1. Left chest tube remains in place. Loculated pleural effusion  appears resolved. No pneumothorax.  2. Continued perihilar and lower lobe opacity suggesting atelectasis.    ANNE MANZANO MD

## 2021-05-03 NOTE — PLAN OF CARE
Temp: 97.9  F (36.6  C) Temp src: Oral BP: 131/76 Pulse: 93   Resp: 18 SpO2: 97 % O2 Device: None (Room air)      D: Persistent loculated pleural effusion s/p CT placement 4/29/21. 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    I/A: Monitored vitals and assessed pt status. A&O x4. VSS see flowsheet. SR/ST. RA. Denies pain. CT with negligible output, dressing CDI. Up SBA/independent. Sleeping between cares.    P: HD today. Plan for pleurodesis this week. Continue to monitor and follow POC. Notify [facility] with changes.

## 2021-05-03 NOTE — PROGRESS NOTES
"  Nephrology Progress Note  05/03/2021         Assessment & Recommendations:   Kecia Blue is a 60 year old female with PMH of ILD s/p b/l lung transplant (2018) c/b R mainstem bronchial stenosis requiring dilations, Aspergillus empyema, and CMV viremia, HTN, hx postop DVT not currently on anticoagulation, hx pAfib, SVT, ESRD, admitted for scheduled left sided chest tube placement due to persistent loculated pleural effusion.      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 Glasgow. Access: LUE AVG. Run time: 3.5 hrs. EDW: 51 kg   - Dialysis per MWF schedule    Volume/BP: 130's  - EDW 51 kg with usual OP UF 2 to 2.6 kg (may need to lower EDW)    BMD: Ca 7.8, no alb or phos; on paricalcitol 2 mcg per HD  - Continue as above  - Ordered phos level    Anemia: hgb > 10 g/dL, no indication for TASH    Recurrent L pleural effusion with fungal empyema  -  s/p L chest tube 4/29, and bronch 4/30  - On long term posaconazole  - Plan is for pleurodesis possibly this week    Recommendations were communicated to primary team via this note     MERRICK Coronado  [phone]    Interval History :   Seen prior to dialysis, feeling ok. Chest tube in place, may have pleurodesis this week. Denies n/v, CP, SOB, chills    Review of Systems:   4 point ROS neg other than as noted above.    Physical Exam:   No intake/output data recorded.   /82 (BP Location: Right arm)   Pulse 93   Temp 97.9  F (36.6  C) (Oral)   Resp 18   Ht 1.6 m (5' 3\")   Wt 51.6 kg (113 lb 12.8 oz)   LMP 06/07/2014 (Exact Date)   SpO2 98%   BMI 20.16 kg/m       GENERAL APPEARANCE: alert, NAD  EYES:  no scleral icterus, pupils equal  HENT: mouth without ulcers or lesions  PULM: diminished, L chest tube  CV: regular rhythm, normal rate     -edema no peripheral  GI: soft   INTEGUMENT: no cyanosis   NEURO:  A/o3  Access LUE AVG    Labs:   All labs reviewed by " me  Electrolytes/Renal -   Recent Labs   Lab Test 05/03/21  0640 05/02/21  0715 05/01/21  0654 04/30/21  0606 04/29/21  1833 04/08/21  0722 03/29/21 03/04/21  0611 03/01/21  0637 03/01/21  0637   * 132* 132* 136  --  138 141 133   < > 128*   POTASSIUM 5.5* 5.0 4.3 4.1  --  3.7 4.3 4.5   < > 5.5*   CHLORIDE 100 97 98 101  --  101 101 98   < > 92*   CO2 23 26 28 27  --  32 25 23   < > 26   BUN 85* 66* 33* 45*  --  22 49.5  11.00 83*   < > 82*   CR 4.55* 4.12* 2.72* 3.40*  --  2.49* 4.50 3.98*   < > 3.45*   * 122* 229* 130*  --  138* 122* 127*   < > 188*   CHELSEY 7.8* 8.3* 9.0 8.6  --  7.9* 7.9 7.9*   < > 8.4*   MAG  --   --   --  1.7 1.6 1.5* 1.4 1.6  --  1.6   PHOS  --   --   --   --   --   --  5.8 6.1*  --  5.0*    < > = values in this interval not displayed.       CBC -   Recent Labs   Lab Test 05/02/21  0715 04/30/21  0606 04/29/21  1020   WBC 7.5 6.8 6.3   HGB 10.6* 10.9* 11.1*    216 218       LFTs -   Recent Labs   Lab Test 05/01/21  0654 03/29/21 03/01/21  0637 02/27/21  0853   ALKPHOS  --  288 339* 326*   BILITOTAL  --  0.9 0.7 0.4   ALT  --  36 49 44   AST  --  19 17 16   PROTTOTAL 7.2  --  6.8 6.6*   ALBUMIN  --  3.5 2.8* 2.5*       Iron Panel -   Recent Labs   Lab Test 02/03/21  0415 12/13/18  1033 08/01/18  0921   IRON 51 16* 93   IRONSAT 36 7* 37   YOLA  --  302* 571*         Imaging:  Reviewed    Current Medications:    sodium chloride 0.9%  250 mL Intravenous Once in dialysis     sodium chloride 0.9%  300 mL Hemodialysis Machine Once     acetylcysteine  2 mL Nebulization BID     albuterol  2.5 mg Nebulization BID     budesonide  0.5 mg Nebulization BID     fluticasone  1 spray Both Nostrils Daily     gelatin absorbable  1 each Topical During Hemodialysis (from stock)     magnesium chloride  1,605 mg Oral Once per day on Sun Tue Thu Sat     metoprolol tartrate  25 mg Oral or Feeding Tube BID     multivitamin RENAL  1 tablet Oral Daily with supper     - MEDICATION INSTRUCTIONS -   Does  not apply Once     pantoprazole  40 mg Oral QAM AC     posaconazole  300 mg Oral Daily     predniSONE  2.5 mg Oral QPM     predniSONE  5 mg Oral QAM     sodium chloride (PF)  3 mL Intracatheter Q8H     sulfamethoxazole-trimethoprim  1 tablet Oral Q Mon Wed Fri AM     tacrolimus  0.5 mg Oral BID IS       Adriana Jarvis PA-C

## 2021-05-03 NOTE — CONSULTS
THORACIC SURGERY CONSULT  Kecia Blue MRN# [id number]   YOB: 1960 Age: 60 year old     Date of Admission: 05/03/21    REASON FOR CONSULTATION: evaluate for pleurodesis     HPI: Kecia Blue is a 60 year old year old female with a complex history including ILD status post bilateral lung transplant (2018) which was complicated by left sided Aspergillus empyema s/p a prolonged course of antifungals and then intrapleural amphotericin bead placement (11/2020). Despite these therapies she has a persistent effusion on the left and was admitted on 4/29/2021 for left sided chest tube placement (14 Fr Pigtail) by IR and potential pleurodesis.     Her medical history includes ascites, portal HTN, and ESRD on intermittent hemodialysis and post operatively she has required several dilations for right sided bronchial stenosis. She was recently admitted with ARDS and sepsis. She is on tacrolimus and prednisone for immunosuppression. Today she is feeling quite well. She is afebrile. Denies shortness of breath or dyspnea.     REVIEW OF SYSTEMS: The remainder of the complete ROS was negative unless noted in the HPI. Denies visual changes, headache, sore throat, rhinorrhea, chest pain, sob, abdominal pain, diarrhea, constipation, fevers, night sweats, weight loss.     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   Abd: soft, ND, NT   : N/a   Extremities: WWP without edema  Neuro: No focal deficits noted, patient moves all extremities spontaneously    LABS reviewed   Cr 4.55   WBC 7.5  Plt 214  Hgb 10.6   5/1/21: Pleural fluid culture NGTD     IMAGING reviewed     4/8/2021 CT CHEST W/O CONTRAST     IMPRESSION:    1. Stable partially calcified pleural cystic structure/collection  along the medial right lower lobe, consistent with chronic  empyema/sequela of prior infection. Trace bilateral pleural effusions.  2. Cardiomegaly with basilar predominant patchy groundglass opacities  with diffuse interlobular septal thickening, consistent with pulmonary  edema.  3. Tree-in-bud nodular opacities predominantly involving the lingula,  concerning for infection.  4. Stable postsurgical changes of bilateral lung transplantation.  5. Cholelithiasis.  6. Splenomegaly.     A/P: Kecia Blue is a 60 year old female with history of ILD s/p bilateral lung transplant (2018) now with left sided Aspergillus empyema which has persisted despite long term antifungals and intrapleural amphotericin beads. A 14 Fr pigtail catheter has been placed for potential pleurodesis this admission.     Please keep chest tube to suction   Will plan for CT Chest non contrast tomorrow (ordered)   Thoracic to follow closely   Please call with questions     Discussed with Dr. Yon Rey   Surgery Resident   [id number]              "

## 2021-05-03 NOTE — PLAN OF CARE
Pt reports feeling well; denies pain. Ambulating independently in room and walked halls with nurse.  Scant drainage from L pigtail CT site-karen mcknight. No output in tube today. CXR done.  Plan for chest CT tomorrow per thoracic surgery consult. Plan for likely pleurodesis this week.   Pt went for regular MWF dialysis run this evening. Does make small amts of urine. BM today.

## 2021-05-04 ENCOUNTER — APPOINTMENT (OUTPATIENT)
Dept: GENERAL RADIOLOGY | Facility: CLINIC | Age: 61
End: 2021-05-04
Attending: INTERNAL MEDICINE
Payer: COMMERCIAL

## 2021-05-04 ENCOUNTER — APPOINTMENT (OUTPATIENT)
Dept: CT IMAGING | Facility: CLINIC | Age: 61
End: 2021-05-04
Attending: STUDENT IN AN ORGANIZED HEALTH CARE EDUCATION/TRAINING PROGRAM
Payer: COMMERCIAL

## 2021-05-04 LAB
BACTERIA SPEC CULT: NO GROWTH
SPECIMEN SOURCE: NORMAL

## 2021-05-04 PROCEDURE — 99233 SBSQ HOSP IP/OBS HIGH 50: CPT | Performed by: INTERNAL MEDICINE

## 2021-05-04 PROCEDURE — 250N000009 HC RX 250: Performed by: NURSE PRACTITIONER

## 2021-05-04 PROCEDURE — 999N000157 HC STATISTIC RCP TIME EA 10 MIN

## 2021-05-04 PROCEDURE — 214N000001 HC R&B CCU UMMC

## 2021-05-04 PROCEDURE — 94640 AIRWAY INHALATION TREATMENT: CPT

## 2021-05-04 PROCEDURE — 99232 SBSQ HOSP IP/OBS MODERATE 35: CPT | Mod: 24 | Performed by: INTERNAL MEDICINE

## 2021-05-04 PROCEDURE — 99207 PR CDG-CUT & PASTE-POTENTIAL IMPACT ON LEVEL: CPT | Performed by: INTERNAL MEDICINE

## 2021-05-04 PROCEDURE — 71250 CT THORAX DX C-: CPT

## 2021-05-04 PROCEDURE — 250N000013 HC RX MED GY IP 250 OP 250 PS 637: Performed by: NURSE PRACTITIONER

## 2021-05-04 PROCEDURE — 71046 X-RAY EXAM CHEST 2 VIEWS: CPT | Mod: 26 | Performed by: RADIOLOGY

## 2021-05-04 PROCEDURE — 71250 CT THORAX DX C-: CPT | Mod: 26 | Performed by: RADIOLOGY

## 2021-05-04 PROCEDURE — 71046 X-RAY EXAM CHEST 2 VIEWS: CPT

## 2021-05-04 PROCEDURE — 999N000128 HC STATISTIC PERIPHERAL IV START W/O US GUIDANCE

## 2021-05-04 PROCEDURE — 94640 AIRWAY INHALATION TREATMENT: CPT | Mod: 76

## 2021-05-04 PROCEDURE — 250N000012 HC RX MED GY IP 250 OP 636 PS 637: Performed by: NURSE PRACTITIONER

## 2021-05-04 RX ADMIN — MAGNESIUM 64 MG (MAGNESIUM CHLORIDE) TABLET,DELAYED RELEASE 1605 MG: at 18:14

## 2021-05-04 RX ADMIN — ACETYLCYSTEINE 2 ML: 200 SOLUTION ORAL; RESPIRATORY (INHALATION) at 19:28

## 2021-05-04 RX ADMIN — BUDESONIDE 0.5 MG: 0.5 INHALANT RESPIRATORY (INHALATION) at 07:50

## 2021-05-04 RX ADMIN — BUDESONIDE 0.5 MG: 0.5 INHALANT RESPIRATORY (INHALATION) at 19:28

## 2021-05-04 RX ADMIN — PREDNISONE 2.5 MG: 2.5 TABLET ORAL at 21:44

## 2021-05-04 RX ADMIN — TACROLIMUS 0.5 MG: 0.5 CAPSULE ORAL at 18:06

## 2021-05-04 RX ADMIN — PANTOPRAZOLE SODIUM 40 MG: 40 TABLET, DELAYED RELEASE ORAL at 08:06

## 2021-05-04 RX ADMIN — ALBUTEROL SULFATE 2.5 MG: 2.5 SOLUTION RESPIRATORY (INHALATION) at 19:28

## 2021-05-04 RX ADMIN — METOPROLOL TARTRATE 25 MG: 25 TABLET, FILM COATED ORAL at 08:06

## 2021-05-04 RX ADMIN — TACROLIMUS 0.5 MG: 0.5 CAPSULE ORAL at 08:06

## 2021-05-04 RX ADMIN — POSACONAZOLE 300 MG: 100 TABLET, COATED ORAL at 08:08

## 2021-05-04 RX ADMIN — FLUTICASONE PROPIONATE 1 SPRAY: 50 SPRAY, METERED NASAL at 08:08

## 2021-05-04 RX ADMIN — PREDNISONE 5 MG: 5 TABLET ORAL at 08:06

## 2021-05-04 RX ADMIN — B-COMPLEX W/ C & FOLIC ACID TAB 1 MG 1 TABLET: 1 TAB at 18:06

## 2021-05-04 RX ADMIN — ACETYLCYSTEINE 2 ML: 200 SOLUTION ORAL; RESPIRATORY (INHALATION) at 07:50

## 2021-05-04 RX ADMIN — METOPROLOL TARTRATE 25 MG: 25 TABLET, FILM COATED ORAL at 21:44

## 2021-05-04 RX ADMIN — ALBUTEROL SULFATE 2.5 MG: 2.5 SOLUTION RESPIRATORY (INHALATION) at 07:50

## 2021-05-04 ASSESSMENT — ACTIVITIES OF DAILY LIVING (ADL)
ADLS_ACUITY_SCORE: 13

## 2021-05-04 NOTE — PLAN OF CARE
Pt admitted 4/29 for scheduled chest tube placement due to persistent loculated pleural effusion. PMH includes ILD s/p bilateral lung tx 2018 c/b bronchial stenosis requiring dilations, aspergillus empyeme CMV, HTN, DVT, AFib, SVT, and ESRD on HD MWF.     Neuro: A&O x 4. Calls appropraitely. Pleasant affect. Slept well.  Cardiac: AFib with rates 90's-100's. 's to 150's. Denies dizziness, chest pain, or palpitations.   Respiratory: Sating well on RA. Denies DESHPANDE. Pigtail chest tube .  GI/: Low output overnight following dialysis.   Diet/Appetite: Regular  Skin: Chest tube dressing CDI.  LDA: L AV fistula. R PIV SL  Activity: Up independently.   Pain: Denies     Plan: Continue to monitor and alert team with any changes or concerns.

## 2021-05-04 NOTE — PROGRESS NOTES
HEMODIALYSIS TREATMENT NOTE    Date: 5/3/2021  Time: 10:05 PM    Data:  Pre Wt: 52.5 kg (115 lb 11.9 oz)   Desired Wt: 51 kg   Post Wt: 50.4 kg (111 lb 1.8 oz)  Weight change: 2.1 kg  Ultrafiltration - Post Run Net Total Removed (mL): 1500 mL  Vascular Access Status: MAXWELL AVGraft  patent  Dialyzer Rinse: Streaked, Light  Total Blood Volume Processed: 83.6 L   Total Dialysis (Treatment) Time: 3.5 hrs  Dialysate Bath: K 2, Ca 3, Na 138, Bicarb: 32  Heparin: None    Lab:   No    Assessment:  Positive bruit and thrill on MAXWELL AVF.  Pre run K/Ca: 5.5/7.8, BUN/Cr: 85/ 4.55, Hgb/Hct: 10.3/ 33.8  HB S Ag and Ab: (-)/ 0.0 at 4-  Dialysis consent signed at 1-  Lung Transplant at 2018.  Left Chest tube on(-) -> connected to wall suction with -20 mmHg    Interventions:  Patient dialyzed for 3 hrs 30 mins via MAXWELL AVG. Initiated HD with 15 G fistula needles , Lidocaine subcutaneous before cannulation. Reached BFR /DFR to 400 /600 ml/mins. Stable V/S and tolerable for 3.5 hrs run with 1.5 kg off. Finished HD with rinse back. decannulated site held for 15 mins with gelform.     Plan:    Next run per renal team.

## 2021-05-04 NOTE — PLAN OF CARE
Admitted on 4/29 for scheduled left sided chest tube placement w/ IR due to persistent loculated pleural effusion. . Pmhx: ILD s/p bilateral lung transplant (3/1/2018) c/b R mainstem bronchial stenosis requiring dilations, Aspergillus empyema, and CMV viremia, HTN, hx postop DVT not currently on anticoagulation, hx PAF, SVT, ESRD on HD MWF, and anemia.    Code status: full     Team: gold 9  Changed: chest tube dressing changed   Running: chest tube to suction (-20)     Neuro: ao* 4  Cardiac/Tele:  SR/ST occasionally junctional   Respiratory: room air, productive cough, R side rhonchi lung sounds   GI/:urinating via toliet, LBM 5/4  Diet/Appetite: Regular  Skin: pigtail dressing CDI   LDAs: PIV  Activity: up ad diego  Pain: sahil Okeefe, RN  Time cared for 0700 - 1930

## 2021-05-04 NOTE — PROGRESS NOTES
Pulmonary Medicine  Cystic Fibrosis - Lung Transplant Team  Progress Note  May 4, 2021       Patient: Kecia Blue  MRN: [id number]  : 1960 (age 60 year old)  Transplant: 3/1/2018 (Lung), POD#[id number])  Admission date: 2021    Assessment & Plan:   Kecia Blue is a 60 year old female with PMH of BSLT (2018) for ILD with antisynthetase sydrome, postoperative course c/b right mainstem bronchial stenosis s/p several dilations, left-sided Aspergillus empyema () s/p amphotericin beads (2020), recurrent loculated pleural effusion, EBV viremia, CMV viremia, and ESRD on HD MWF. Recent admission - (discharged from ARU 3/4/21) with PJP pneumonia, ARDS, and Septic shock. Admitted 21 for thoracentesis and chest tube placement to loculated left pleural effusion with possible future pleurodesis, and bronchoscopy with possible dilation/stent/tissue debulking on  with Dr. Norris (completed).     Recommendations:  - Tacrolimus level was 8.0 on 5/3.  Goal is 8-10. No dose changes. Repeat level on .   - Thoracic surgery consulted for pleurodesis as per outpatient plans. CT Chest today shows pleural thickening and calcified rim on the parietal and visceral pleura with decreased pleural effusions. Pleurodesis plan per thoracic.   - Uptrending blood beta-D glucan level; discussed with transplant ID and likely limited utility and as long as clinically stable does not suggest invasive disease.      Aspergillus empyema (left-sided): Noted 10/8/19. CT scan on 20 with LLL increased pleural-based mass-like density, s/p needle aspiration (20) with Aspergillus fumigatus on cultures, s/p intrapleural amphotericin bead placement (). Negative cultures 2020 and again on 21. Pleural effusion has persisted with monitoring clinically off ABX and follows closely as OP with ID. Most recent BDG fungitell () positive at 311. Most recent ID visit () notes high concern for high  "morbidity with surgery and therefore planned continue to manage medically with long term posaconazole. Effusion persists, s/p planned left pleural chest tube placement and thoracentesis (4/29), with possible future pleurodesis. Remains afebrile, no leukocytosis.  - Bacterial pleural cultures negative to date.  - Pleural cultures (fungal, AFB) pending  -Pleural fluid LDH markedly elevated at 4706 with total protein 3.3, cholesterol 103 and triglycerides 70.  White blood cell count is 9460 but no differential because \"cells too damaged to perform differential\".  Markedly elevated LDH, may reflect lysed cells.  Fluid consistent with exudate.  - Continue PTA Posaconazole 300 mg TID   - A. galactomannan Ag and BDG fungitell, pending  - Defer antibiotics and ID consult for now pending cultures, low threshold if decompensates  - Left pleural chest tube to 20 cm wall suction  - Anticipate pleurodesis this week assuming pleural cultures remain negative      Right main bronchial stenosis s/p dilatation: Follow with Dr. Norris for serial bronchoscopies with dilation. Last bronch 2/19/21.  -Right mainstem/bronchus intermedius dilation and debulking with good bronchoscopic results and no complications noted.  - Airway clearance with IS, Aerobika, and Nebs: Mucomyst, Albuterol, and Pulmicort BID     S/p bilateral lung transplant for ILD 2/2 CTD:  Recent admission, as above, with Stenotrophomonas, Eikenella, and PJP pneumonia. Last seen in pulmonary clinic 4/8/21 (virtual). PFTs at that time improved from prior, still below post transplant best.  Most recent DSA (1/25) and CMV (4/22) not detected.  -Chest x-ray (5/1) reviewed by me with left chest tube in good position with near complete resolution of the pleural collection and decreased right basilar atelectasis.  - DSA (5/1) pending     Immunosuppression: On 2 drug IST d/t recurrent infections  - Tacrolimus level (4/30) supratherapeutic at 15.3 (10 hr). Goal level 8-10.   Dose " "reduced to 0.5 mg BID. Repeat level 5/3 (ordered)  - Prednisone 5 mg qAM / 2.5 mg qPM     Prophylaxis:   - Bactrim MWF PPx     EBV viremia:  Level 12/9/20 mildly elevated to 10K (log 4) from prior 3K (3.5 log) on 9/9/20, repeat (1/25/21) decreased to 5619 copies (log of 3.8). Most recent level 2/22 elevated to 75K/log 4.9, thought to be secondary to acute illness and recent steroid burst. CT chest (w/o contrast), with no thoracic adenopathy.   - Repeat EBV pending     Other relevent problems managed by primary team:     CKD stage IV: Creatinine stable on admission at 3.4. Recent baseline 2.4-3.4 and has worsened since 2/2021. Dialysis MWF.  - Will continue to monitor tacrolimus levels  - Dialysis per nephrology     We appreciate the excellent care provided by the [place] [id number] team.  Recommendations communicated via in person rounding and this note.  Will continue to follow along closely, please do not hesitate to call with any questions or concerns.      Clarisse Roberson MD [facility]     Subjective & Interval History:     No major events overnight, went for her regular HD yesterday which was uneventful.   Still continues to have some coughing and ronchi on the right hemithorax per patient which is normal for her. Feels somewhat less tight after bronchoscopic debulking of the right anastomosis.      Review of Systems:     Please see HPI, otherwise the complete 10 point ROS is negative.     Physical Exam:     Vital signs:  Temp: 97.9  F (36.6  C) Temp src: Oral BP: 135/81 Pulse: 102   Resp: 20 SpO2: 100 % O2 Device: None (Room air) Oxygen Delivery: 2.5 LPM Height: 160 cm (5' 3\") Weight: 50.4 kg (111 lb 1.8 oz)(Post HD by standing scale)  I/O:     Intake/Output Summary (Last 24 hours) at 5/4/2021 1254  Last data filed at 5/4/2021 0900  Gross per 24 hour   Intake 360 ml   Output 1610 ml   Net -1250 ml       GENERAL: alert, NAD  HEENT: NCAT, no icterus  Neck: no cervical or supraclavicular adenopathy  Lungs: good air flow, " mild inspiratory crackles  CV: RRR, S1S2, no murmurs noted  Abdomen: normoactive BS, soft, non tender  Neuro: AAO X 3  Psychiatric: normal affect, good eye contact  Skin: no rash, jaundice or lesions on limited exam  Extremities: No clubbing, cyanosis or edema.         Lines, Drains, and Devices:  Peripheral IV 05/04/21 Right;Anterior Lower forearm (Active)   Site Assessment [facility] 05/04/21 0900   Line Status Saline locked 05/04/21 0900   Phlebitis Scale 0-->no symptoms 05/04/21 0900   Infiltration Scale 0 05/04/21 0900   Number of days: 0       Hemodialysis Vascular Access Arteriovenous fistula Left Arm (Active)   Site Assessment [facility] 05/04/21 0900   Cannulation Needle Size 15 05/03/21 2123   Dressing Intervention New dressing 05/04/21 0900   Dressing Status Clean, dry, intact 05/04/21 0900   Hand Off Report Yes 05/03/21 2123   Number of days: 99     Data:     LABS    CMP:   Recent Labs   Lab 05/03/21  0640 05/02/21  0715 05/01/21  0654 04/30/21  0606 04/29/21  1833   * 132* 132* 136  --    POTASSIUM 5.5* 5.0 4.3 4.1  --    CHLORIDE 100 97 98 101  --    CO2 23 26 28 27  --    ANIONGAP 9 9 7 8  --    * 122* 229* 130*  --    BUN 85* 66* 33* 45*  --    CR 4.55* 4.12* 2.72* 3.40*  --    GFRESTIMATED 10* 11* 18* 14*  --    GFRESTBLACK 11* 13* 21* 16*  --    CHELSEY 7.8* 8.3* 9.0 8.6  --    MAG  --   --   --  1.7 1.6   PHOS 5.9*  --   --   --   --    PROTTOTAL  --   --  7.2  --   --      CBC:   Recent Labs   Lab 05/02/21  0715 04/30/21  0606 04/29/21  1020   WBC 7.5 6.8 6.3   RBC 3.47* 3.41* 3.45*   HGB 10.6* 10.9* 11.1*   HCT 33.8* 34.4* 34.5*   MCV 97 101* 100   MCH 30.5 32.0 32.2   MCHC 31.4* 31.7 32.2   RDW 13.9 13.9 13.9    216 218       INR:   Recent Labs   Lab 04/29/21  1020   INR 0.99       Glucose:   Recent Labs   Lab 05/03/21  0640 05/02/21  0715 05/01/21  0654 04/30/21  1547 04/30/21  0606   * 122* 229*  --  130*   BGM  --   --   --  110*  --        Blood Gas: No lab results found in last 7  days.    Culture Data   Recent Labs   Lab 05/01/21  1102 04/29/21  1315   CULT Culture negative after 2 days  Quantity not sufficient  Called to Maxim Norman at 1236 5/1/21.    mlb   Culture negative monitoring continues  Culture negative monitoring continues       Virology Data:   Lab Results   Component Value Date    FLUAH1 Not Detected 01/24/2021    FLUAH3 Not Detected 01/24/2021    SS9843 Not Detected 01/24/2021    IFLUB Not Detected 01/24/2021    RSVA Not Detected 01/24/2021    RSVB Not Detected 01/24/2021    PIV1 Not Detected 01/24/2021    PIV2 Not Detected 01/24/2021    PIV3 Not Detected 01/24/2021    HMPV Not Detected 01/24/2021    HRVS Negative 01/24/2021    ADVBE Negative 01/24/2021    ADVC Negative 01/24/2021    ADVC Negative 12/23/2020    ADVC Negative 10/07/2019       Historical CMV results (last 3 of prior testing):  Lab Results   Component Value Date    CMVQNT CMV DNA Not Detected 02/25/2021    CMVQNT <137 (A) 01/25/2021    CMVQNT 238 (A) 01/24/2021     Lab Results   Component Value Date    CMVLOG Not Calculated 02/25/2021    CMVLOG <2.1 01/25/2021    CMVLOG 2.4 (H) 01/24/2021       Urine Studies    Recent Labs   Lab Test 01/24/21  1729 10/21/19  2240   URINEPH 5.0 5.0   NITRITE Negative Negative   LEUKEST Moderate* Large*   WBCU 34* 115*       Most Recent [place] Pulmonary Function Testing (FVC/FEV1 only)  FVC-Pre   Date Value Ref Range Status   04/08/2021 1.41 L    12/09/2020 1.12 L    09/09/2020 1.56 L    03/09/2020 1.57 L      FVC-%Pred-Pre   Date Value Ref Range Status   04/08/2021 43 %    12/09/2020 34 %    09/09/2020 47 %    03/09/2020 48 %      FEV1-Pre   Date Value Ref Range Status   04/08/2021 1.09 L    12/09/2020 0.98 L    09/09/2020 1.10 L    03/09/2020 0.96 L      FEV1-%Pred-Pre   Date Value Ref Range Status   04/08/2021 42 %    12/09/2020 37 %    09/09/2020 42 %    03/09/2020 37 %        IMAGING    Recent Results (from the past 48 hour(s))   XR Chest 2 Views    Narrative    Exam: XR  CHEST 2 VW, 5/3/2021 9:49 AM    Indication: follow up for effusion and chest tube    Comparison: 5/2/2021    Findings:   Right chest tube is unchanged in position. No significant fluid seen  around the chest tube. Borderline cardiomegaly. Pulmonary vasculature  not engorged. Perihilar and lower lobe hazy opacity suggesting  atelectasis is similar.      Impression    Impression:   1. Left chest tube remains in place. Loculated pleural effusion  appears resolved. No pneumothorax.  2. Continued perihilar and lower lobe opacity suggesting atelectasis.    ANNE MANZANO MD   CT Chest w/o Contrast    Impression    IMPRESSION:   1. Decreased size of the peripherally calcified chronic empyema within  the medial left lower lobe with left sided chest tube in place.  Retained amphotericin bead is within the collection.  2. Cardiomegaly with basilar predominance groundglass opacities and  interlobular septal thickening, compatible with pulmonary edema.  3. Postsurgical changes of bilateral lung transplant with unchanged  mild right bronchial anastomotic narrowing.

## 2021-05-05 LAB
ANION GAP SERPL CALCULATED.3IONS-SCNC: 8 MMOL/L (ref 3–14)
BUN SERPL-MCNC: 53 MG/DL (ref 7–30)
CALCIUM SERPL-MCNC: 8.8 MG/DL (ref 8.5–10.1)
CHLORIDE SERPL-SCNC: 103 MMOL/L (ref 94–109)
CO2 SERPL-SCNC: 25 MMOL/L (ref 20–32)
CREAT SERPL-MCNC: 3.62 MG/DL (ref 0.52–1.04)
GFR SERPL CREATININE-BSD FRML MDRD: 13 ML/MIN/{1.73_M2}
GLUCOSE SERPL-MCNC: 118 MG/DL (ref 70–99)
POTASSIUM SERPL-SCNC: 4.5 MMOL/L (ref 3.4–5.3)
SODIUM SERPL-SCNC: 137 MMOL/L (ref 133–144)

## 2021-05-05 PROCEDURE — 99233 SBSQ HOSP IP/OBS HIGH 50: CPT | Mod: 24 | Performed by: INTERNAL MEDICINE

## 2021-05-05 PROCEDURE — 93010 ELECTROCARDIOGRAM REPORT: CPT | Performed by: INTERNAL MEDICINE

## 2021-05-05 PROCEDURE — 258N000003 HC RX IP 258 OP 636: Performed by: INTERNAL MEDICINE

## 2021-05-05 PROCEDURE — 36415 COLL VENOUS BLD VENIPUNCTURE: CPT | Performed by: NURSE PRACTITIONER

## 2021-05-05 PROCEDURE — 250N000009 HC RX 250: Performed by: NURSE PRACTITIONER

## 2021-05-05 PROCEDURE — 999N000157 HC STATISTIC RCP TIME EA 10 MIN

## 2021-05-05 PROCEDURE — 80048 BASIC METABOLIC PNL TOTAL CA: CPT | Performed by: NURSE PRACTITIONER

## 2021-05-05 PROCEDURE — 90937 HEMODIALYSIS REPEATED EVAL: CPT

## 2021-05-05 PROCEDURE — 94640 AIRWAY INHALATION TREATMENT: CPT

## 2021-05-05 PROCEDURE — 214N000001 HC R&B CCU UMMC

## 2021-05-05 PROCEDURE — 99233 SBSQ HOSP IP/OBS HIGH 50: CPT | Performed by: PHYSICIAN ASSISTANT

## 2021-05-05 PROCEDURE — 87210 SMEAR WET MOUNT SALINE/INK: CPT | Performed by: INTERNAL MEDICINE

## 2021-05-05 PROCEDURE — 250N000012 HC RX MED GY IP 250 OP 636 PS 637: Performed by: NURSE PRACTITIONER

## 2021-05-05 PROCEDURE — 94640 AIRWAY INHALATION TREATMENT: CPT | Mod: 76

## 2021-05-05 PROCEDURE — 250N000013 HC RX MED GY IP 250 OP 250 PS 637: Performed by: NURSE PRACTITIONER

## 2021-05-05 PROCEDURE — 250N000009 HC RX 250: Performed by: INTERNAL MEDICINE

## 2021-05-05 PROCEDURE — 99207 PR CDG-CUT & PASTE-POTENTIAL IMPACT ON LEVEL: CPT | Performed by: INTERNAL MEDICINE

## 2021-05-05 PROCEDURE — 99233 SBSQ HOSP IP/OBS HIGH 50: CPT | Performed by: INTERNAL MEDICINE

## 2021-05-05 PROCEDURE — 93005 ELECTROCARDIOGRAM TRACING: CPT

## 2021-05-05 RX ORDER — PARICALCITOL 5 UG/ML
2 INJECTION, SOLUTION INTRAVENOUS
Status: DISCONTINUED | OUTPATIENT
Start: 2021-05-05 | End: 2021-05-05

## 2021-05-05 RX ADMIN — LIDOCAINE HYDROCHLORIDE 0.5 ML: 10 INJECTION, SOLUTION EPIDURAL; INFILTRATION; INTRACAUDAL; PERINEURAL at 10:21

## 2021-05-05 RX ADMIN — BUDESONIDE 0.5 MG: 0.5 INHALANT RESPIRATORY (INHALATION) at 08:50

## 2021-05-05 RX ADMIN — ALBUTEROL SULFATE 2.5 MG: 2.5 SOLUTION RESPIRATORY (INHALATION) at 20:11

## 2021-05-05 RX ADMIN — POSACONAZOLE 300 MG: 100 TABLET, COATED ORAL at 09:12

## 2021-05-05 RX ADMIN — SODIUM CHLORIDE 300 ML: 9 INJECTION, SOLUTION INTRAVENOUS at 10:20

## 2021-05-05 RX ADMIN — PANTOPRAZOLE SODIUM 40 MG: 40 TABLET, DELAYED RELEASE ORAL at 09:11

## 2021-05-05 RX ADMIN — B-COMPLEX W/ C & FOLIC ACID TAB 1 MG 1 TABLET: 1 TAB at 19:16

## 2021-05-05 RX ADMIN — SULFAMETHOXAZOLE AND TRIMETHOPRIM 1 TABLET: 400; 80 TABLET ORAL at 09:11

## 2021-05-05 RX ADMIN — ACETYLCYSTEINE 2 ML: 200 SOLUTION ORAL; RESPIRATORY (INHALATION) at 20:11

## 2021-05-05 RX ADMIN — METOPROLOL TARTRATE 25 MG: 25 TABLET, FILM COATED ORAL at 19:16

## 2021-05-05 RX ADMIN — PREDNISONE 5 MG: 5 TABLET ORAL at 09:12

## 2021-05-05 RX ADMIN — ACETYLCYSTEINE 2 ML: 200 SOLUTION ORAL; RESPIRATORY (INHALATION) at 08:50

## 2021-05-05 RX ADMIN — LIDOCAINE HYDROCHLORIDE 0.5 ML: 10 INJECTION, SOLUTION EPIDURAL; INFILTRATION; INTRACAUDAL; PERINEURAL at 10:22

## 2021-05-05 RX ADMIN — TACROLIMUS 0.5 MG: 0.5 CAPSULE ORAL at 09:11

## 2021-05-05 RX ADMIN — TACROLIMUS 0.5 MG: 0.5 CAPSULE ORAL at 19:16

## 2021-05-05 RX ADMIN — SODIUM CHLORIDE 250 ML: 9 INJECTION, SOLUTION INTRAVENOUS at 10:21

## 2021-05-05 RX ADMIN — ALBUTEROL SULFATE 2.5 MG: 2.5 SOLUTION RESPIRATORY (INHALATION) at 08:50

## 2021-05-05 RX ADMIN — PREDNISONE 2.5 MG: 2.5 TABLET ORAL at 19:16

## 2021-05-05 RX ADMIN — BUDESONIDE 0.5 MG: 0.5 INHALANT RESPIRATORY (INHALATION) at 20:11

## 2021-05-05 ASSESSMENT — ACTIVITIES OF DAILY LIVING (ADL)
ADLS_ACUITY_SCORE: 13

## 2021-05-05 ASSESSMENT — MIFFLIN-ST. JEOR: SCORE: 1053.13

## 2021-05-05 NOTE — PROGRESS NOTES
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 Recent admission - (discharged from ARU 3/4/21) with PJP pneumonia, ARDS, and Septic shock. Admitted 21 for thoracentesis and chest tube placement to loculated left pleural effusion with possible future pleurodesis, and bronchoscopy with possible dilation/stent/tissue debulking on  with Dr. Norris (completed).     Recommendations:  - Tacrolimus level was 8.0 on 5/3.  Goal is 8-10. No dose changes. Repeat level on .   - Thoracic surgery consulted for pleurodesis as per outpatient plans, awaiting recommendation.   - Uptrending blood beta-D glucan level; discussed with transplant ID and likely limited utility and as long as clinically stable does not suggest invasive disease.   - Collect pleural fluid from side port and send for KOH smear.      Aspergillus empyema (left-sided): Noted 10/8/19. CT scan on 20 with LLL increased pleural-based mass-like density, s/p needle aspiration (20) with Aspergillus fumigatus on cultures, s/p intrapleural amphotericin bead placement (). Negative cultures 2020 and again on 21. Pleural effusion has persisted with monitoring clinically off ABX and follows closely as OP with ID. Most recent BDG fungitell () positive at 311. Most recent ID visit () notes high concern for high morbidity with surgery and therefore planned continue to manage  "medically with long term posaconazole. Effusion persists, s/p planned left pleural chest tube placement and thoracentesis (4/29), with possible future pleurodesis. Remains afebrile, no leukocytosis.  - Bacterial pleural cultures negative to date.  - Pleural cultures (fungal, AFB) pending  -Pleural fluid LDH markedly elevated at 4706 with total protein 3.3, cholesterol 103 and triglycerides 70.  White blood cell count is 9460 but no differential because \"cells too damaged to perform differential\".  Markedly elevated LDH, may reflect lysed cells.  Fluid consistent with exudate.  - Continue PTA Posaconazole 300 mg TID   - A. galactomannan Ag and BDG fungitell, pending  - Defer antibiotics and ID consult for now pending cultures, low threshold if decompensates  - Left pleural chest tube to 20 cm wall suction  - Anticipate pleurodesis this week assuming pleural cultures remain negative      Right main bronchial stenosis s/p dilatation: Follow with Dr. Norris for serial bronchoscopies with dilation. Last bronch 2/19/21.  -Right mainstem/bronchus intermedius dilation and debulking with good bronchoscopic results and no complications noted.  - Airway clearance with IS, Aerobika, and Nebs: Mucomyst, Albuterol, and Pulmicort BID     S/p bilateral lung transplant for ILD 2/2 CTD:  Recent admission, as above, with Stenotrophomonas, Eikenella, and PJP pneumonia. Last seen in pulmonary clinic 4/8/21 (virtual). PFTs at that time improved from prior, still below post transplant best.  Most recent DSA (1/25) and CMV (4/22) not detected.  -Chest x-ray (5/1) reviewed by me with left chest tube in good position with near complete resolution of the pleural collection and decreased right basilar atelectasis.  - DSA (5/1) pending     Immunosuppression: On 2 drug IST d/t recurrent infections  - Tacrolimus level (4/30) supratherapeutic at 15.3 (10 hr). Goal level 8-10.   Dose reduced to 0.5 mg BID. Repeat level 5/3 (ordered)  - Prednisone 5 " "mg qAM / 2.5 mg qPM     Prophylaxis:   - Bactrim MWF PPx     EBV viremia:  Level 12/9/20 mildly elevated to 10K (log 4) from prior 3K (3.5 log) on 9/9/20, repeat (1/25/21) decreased to 5619 copies (log of 3.8). Most recent level 2/22 elevated to 75K/log 4.9, thought to be secondary to acute illness and recent steroid burst. CT chest (w/o contrast), with no thoracic adenopathy.   - Repeat EBV pending     Other relevent problems managed by primary team:     CKD stage IV: Creatinine stable on admission at 3.4. Recent baseline 2.4-3.4 and has worsened since 2/2021. Dialysis MWF.  - Will continue to monitor tacrolimus levels  - Dialysis per nephrology     We appreciate the excellent care provided by the [place] [id number] team.  Recommendations communicated via in person rounding and this note.  Will continue to follow along closely, please do not hesitate to call with any questions or concerns.      Clarisse Roberson MD [facility]     Subjective & Interval History:     No complaints, completed HD this morning without complications.  Has been up and walking, planning on going for a walk again after lunch.  Denies any pain, moving her bowels.     Review of Systems:     Please see HPI, otherwise the complete 10 point ROS is negative.     Physical Exam:     Vital signs:  Temp: 97  F (36.1  C) Temp src: Oral BP: (!) 135/90 Pulse: 93   Resp: 18 SpO2: 98 % O2 Device: None (Room air) Oxygen Delivery: 2.5 LPM Height: 160 cm (5' 3\") Weight: 50.4 kg (111 lb 1.8 oz)  I/O:     Intake/Output Summary (Last 24 hours) at 5/5/2021 1430  Last data filed at 5/5/2021 0900  Gross per 24 hour   Intake 480 ml   Output 3 ml   Net 477 ml       GENERAL: alert, NAD  HEENT: NCAT, no icterus  Neck: no cervical or supraclavicular adenopathy  Lungs: good air flow, mild inspiratory crackles  CV: RRR, S1S2, no murmurs noted  Abdomen: normoactive BS, soft, non tender  Neuro: AAO X 3  Psychiatric: normal affect, good eye contact  Skin: no rash, jaundice or lesions on " limited exam  Extremities: No clubbing, cyanosis or edema.      Lines, Drains, and Devices:  Peripheral IV 05/04/21 Right;Anterior Lower forearm (Active)   Site Assessment [facility] 05/05/21 0900   Line Status Saline locked 05/05/21 0900   Phlebitis Scale 0-->no symptoms 05/05/21 0900   Infiltration Scale 0 05/05/21 0900   Number of days: 1       Hemodialysis Vascular Access Arteriovenous fistula Left Arm (Active)   Site Assessment [facility] 05/05/21 1000   Cannulation Needle Size 15 05/05/21 1000   Dressing Intervention New dressing 05/04/21 0900   Dressing Status Clean, dry, intact 05/04/21 0900   Hand Off Report Yes 05/03/21 2123   Number of days: 100     Data:     LABS    CMP:   Recent Labs   Lab 05/05/21  0707 05/03/21  0640 05/02/21  0715 05/01/21  0654 04/30/21  0606 04/29/21  1833 04/29/21  1833    132* 132* 132* 136   < >  --    POTASSIUM 4.5 5.5* 5.0 4.3 4.1   < >  --    CHLORIDE 103 100 97 98 101   < >  --    CO2 25 23 26 28 27   < >  --    ANIONGAP 8 9 9 7 8   < >  --    * 129* 122* 229* 130*   < >  --    BUN 53* 85* 66* 33* 45*   < >  --    CR 3.62* 4.55* 4.12* 2.72* 3.40*   < >  --    GFRESTIMATED 13* 10* 11* 18* 14*   < >  --    GFRESTBLACK 15* 11* 13* 21* 16*   < >  --    CHELSEY 8.8 7.8* 8.3* 9.0 8.6   < >  --    MAG  --   --   --   --  1.7  --  1.6   PHOS  --  5.9*  --   --   --   --   --    PROTTOTAL  --   --   --  7.2  --   --   --     < > = values in this interval not displayed.     CBC:   Recent Labs   Lab 05/02/21  0715 04/30/21  0606 04/29/21  1020   WBC 7.5 6.8 6.3   RBC 3.47* 3.41* 3.45*   HGB 10.6* 10.9* 11.1*   HCT 33.8* 34.4* 34.5*   MCV 97 101* 100   MCH 30.5 32.0 32.2   MCHC 31.4* 31.7 32.2   RDW 13.9 13.9 13.9    216 218       INR:   Recent Labs   Lab 04/29/21  1020   INR 0.99       Glucose:   Recent Labs   Lab 05/05/21  0707 05/03/21  0640 05/02/21  0715 05/01/21  0654 04/30/21  1547 04/30/21  0606   * 129* 122* 229*  --  130*   BGM  --   --   --   --  110*  --         Blood Gas: No lab results found in last 7 days.    Culture Data   Recent Labs   Lab 05/01/21  1102 04/29/21  1315   CULT Culture negative after 4 days  Quantity not sufficient  Called to [facility] at 1236 5/1/21.    mlb   No growth  Culture negative monitoring continues       Virology Data:   Lab Results   Component Value Date    FLUAH1 Not Detected 01/24/2021    FLUAH3 Not Detected 01/24/2021    QJ5829 Not Detected 01/24/2021    IFLUB Not Detected 01/24/2021    RSVA Not Detected 01/24/2021    RSVB Not Detected 01/24/2021    PIV1 Not Detected 01/24/2021    PIV2 Not Detected 01/24/2021    PIV3 Not Detected 01/24/2021    HMPV Not Detected 01/24/2021    HRVS Negative 01/24/2021    ADVBE Negative 01/24/2021    ADVC Negative 01/24/2021    ADVC Negative 12/23/2020    ADVC Negative 10/07/2019       Historical CMV results (last 3 of prior testing):  Lab Results   Component Value Date    CMVQNT CMV DNA Not Detected 02/25/2021    CMVQNT <137 (A) 01/25/2021    CMVQNT 238 (A) 01/24/2021     Lab Results   Component Value Date    CMVLOG Not Calculated 02/25/2021    CMVLOG <2.1 01/25/2021    CMVLOG 2.4 (H) 01/24/2021       Urine Studies    Recent Labs   Lab Test 01/24/21  1729 10/21/19  2240   URINEPH 5.0 5.0   NITRITE Negative Negative   LEUKEST Moderate* Large*   WBCU 34* 115*       Most Recent [place] Pulmonary Function Testing (FVC/FEV1 only)  FVC-Pre   Date Value Ref Range Status   04/08/2021 1.41 L    12/09/2020 1.12 L    09/09/2020 1.56 L    03/09/2020 1.57 L      FVC-%Pred-Pre   Date Value Ref Range Status   04/08/2021 43 %    12/09/2020 34 %    09/09/2020 47 %    03/09/2020 48 %      FEV1-Pre   Date Value Ref Range Status   04/08/2021 1.09 L    12/09/2020 0.98 L    09/09/2020 1.10 L    03/09/2020 0.96 L      FEV1-%Pred-Pre   Date Value Ref Range Status   04/08/2021 42 %    12/09/2020 37 %    09/09/2020 42 %    03/09/2020 37 %        IMAGING    Recent Results (from the past 48 hour(s))   XR Chest 2 Views     Narrative    EXAM: XR CHEST 2 VW  5/4/2021 8:48 AM     HISTORY:  s/p BLTx, recurrent left pleural effusion s/p chest tube  placement       COMPARISON:  Chest x-ray 5/3/2021    FINDINGS:   PA and lateral radiographs of the chest. Postsurgical changes of  bilateral lung transplant with intact median sternotomy wires. Stable  position of left sided chest tube. Trachea is midline. Cardiac  silhouette is stable in size. Grossly unchanged streaky perihilar  opacities. No significant pleural effusion. No pneumothorax. No new  airspace opacities.      Impression    IMPRESSION:   1. Grossly unchanged streaky perihilar opacities, likely  atelectasis/edema.  2. Left-sided chest tube in place without appreciable pleural effusion  or pneumothorax.    I have personally reviewed the examination and initial interpretation  and I agree with the findings.    ADAM GONZALEZ MD   CT Chest w/o Contrast    Narrative    EXAMINATION: Chest CT  5/4/2021 10:45 AM    CLINICAL HISTORY: S/p lung transplant, persistent aspergillus left  empyema, pleural effusion, s/p left chest pig tail catheter    COMPARISON: CT chest 4/29/2021 and 4/8/2021.    TECHNIQUE: CT imaging obtained through the chest without contrast.  Axial, coronal, and sagittal reconstructions and axial MIP reformatted  images are reviewed.     CONTRAST: None    FINDINGS:  Lungs: Postsurgical changes of bilateral lung transplant. No  significant change in the mild right bronchial anastomotic narrowing.  The airway is otherwise patent. Mild right-sided bronchial wall  thickening. Accessory azygos lobe fissure on the right. Decreased size  of the peripherally calcified loculated gas and fluid collection in  the medial left lower lobe with left chest tube coiled in the  collection. Dense retained amphotericin bead within the dependent  portion of the collection. Bilateral pleural thickening. No  significant pleural effusion. No pneumothorax. Patchy groundglass  opacities and  interlobular septal thickening throughout the bilateral  lower lobes, right greater than left, which have slightly decreased  compared to CT of the chest on 4/8/2021. No new airspace  consolidation.    Mediastinum: The thyroid is unremarkable. Cardiac size is enlarged. No  pericardial effusion. Normal caliber of the aorta and main pulmonary  artery. No enlarged thoracic lymph nodes. Esophagus is normal in  caliber.    Bones and soft tissues: No suspicious bone findings. Stable anterior  wedge compression deformity of T5. Intact median sternotomy wires.    Upper Abdomen: Limited evaluation of the upper abdomen. Cholelithiasis  without pericholecystic inflammatory changes.      Impression    IMPRESSION:   1. Decreased size of the peripherally calcified chronic empyema within  the medial left lower lobe with left sided chest tube in place.  Retained amphotericin bead is within the collection.  2. Cardiomegaly with basilar predominance groundglass opacities and  interlobular septal thickening, compatible with pulmonary edema.  3. Postsurgical changes of bilateral lung transplant with unchanged  mild right bronchial anastomotic narrowing.    I have personally reviewed the examination and initial interpretation  and I agree with the findings.    ADAM GONZALEZ MD

## 2021-05-05 NOTE — PROGRESS NOTES
[facility]    Medicine Progress Note - Hospitalist Service       Date of Admission:  4/29/2021  Assessment & Plan      Kecia Blue is a 60 year old female with past medical history significant for ILD s/p bilateral lung transplant (3/1/2018) 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  Pleural fluid cx obtained.    - s/p bronchoscopy on 4/30 w/ Dr. Norris for possible dilation and stent/tissue debulking. (plan on repeat bronchoscopy in about 2 months to assess whether further tissue debulking is beneficial.)  - Transplant Pulmonary following, appreciate recommendations (Details of immunosuppresion as in there note)  - IS: continue Prednisone 5mg/2.5mg, [place] (goal 8-10)   - Continue PTA Posaconazole 300mg daily   - Continue PTA Bactrim on MWF   - Continue Albuterol nebs BID  - Continue Pulmicort nebs BID   - Continue Flonase daily   - Follow up CT chest in 7/2021 per Transplant ID     - Follow up cultures. 1,3 Beta D glucan fungitell + (D/W Pulm who have d/w transplant ID)  - Plan for pleurodesis in next few days- thoracic Sx consult placed, awaiting rec's    # ESRD on HD MWF - Follows w/ [facility] under Dr. Glasgow via AVF/G.  On HD since 11/2019.  Non-oliguiric, mostly urinates in the morning hours.    - Nephrology consult to continue HD      # Hx PAF, SVT - Per  "chart review, hx of AF w/ RVR during periods of acute illness and acute respiratory failure.  Follows w/ Cards EP, last seen by Dr. Mcmahan on 4/21.  Outpt Ziopatch study neg for AF, though did have frequent runs of SVT but was asymptomatic.  Currently on Metoprolol 25mg BID for rate control.  [id number] 1.  Per Cardiology, no indication for anticoagulation at this time.   - Continue PTA Metoprolol w/ hold parameters   - Follow up with [facility] EP as needed     # Hypomagnesemia -   Currently on Slow-Mag 71.5-119mg on non-HD days.   - Continue PTA Slow-mag supplementation   - Ensure stable lytes given hx SVT, PAF      # Anemia - Likely 2/2 ESRD.       Diet: Regular Diet Adult    DVT Prophylaxis: Pneumatic Compression Devices  Basilio Catheter: not present  Code Status: Full Code           Disposition Plan   Expected discharge: 2 - 3 days, recommended to prior living arrangement once breahting stable and plan for chest tube.  Entered: Morgan Downey MD 05/05/2021, 3:51 PM      The patient's care was discussed with the Bedside Nurse, Care Coordinator/ and Patient.    Morgan Downey MD  Hospitalist Service  [facility]  Contact information available via [facility] Paging/Directory  Please see sign in/sign out for up to date coverage information  ______________________________________________________________________    Interval History   No acute events overnight, afebrile and hds, awaiting review and recommendations by Thoracic surgery     Data reviewed today: I reviewed all medications, new labs and imaging results over the last 24 hours. I personally reviewed the chest x-ray image(s) showing as below.    Physical Exam   BP (!) 135/90   Pulse 93   Temp 97  F (36.1  C)   Resp 18   Ht 1.6 m (5' 3\")   Wt 50.4 kg (111 lb 1.8 oz)   LMP 06/07/2014 (Exact Date)   SpO2 98%   BMI 19.68 kg/m    Gen- pleasant  lying in bed  HEENT- NAD, DILIP  Neck- supple, no JVD " elevation, no thyromegaly  CVS: RRR,  RS- CTAB, decreased at base with LL - chest tube   Abdo- soft, no tenderness . No g/r/r  Ext- no edema   CNS- no focal signs     Data    BMP  Recent Labs   Lab 05/05/21  0707 05/03/21  0640 05/02/21  0715 05/01/21  0654    132* 132* 132*   POTASSIUM 4.5 5.5* 5.0 4.3   CHLORIDE 103 100 97 98   CHELSEY 8.8 7.8* 8.3* 9.0   CO2 25 23 26 28   BUN 53* 85* 66* 33*   CR 3.62* 4.55* 4.12* 2.72*   * 129* 122* 229*     CBC  Recent Labs   Lab 05/02/21  0715 04/30/21  0606 04/29/21  1020   WBC 7.5 6.8 6.3   RBC 3.47* 3.41* 3.45*   HGB 10.6* 10.9* 11.1*   HCT 33.8* 34.4* 34.5*   MCV 97 101* 100   MCH 30.5 32.0 32.2   MCHC 31.4* 31.7 32.2   RDW 13.9 13.9 13.9    216 218     INR  Recent Labs   Lab 04/29/21  1020   INR 0.99         No results found for this or any previous visit (from the past 24 hour(s)).

## 2021-05-05 NOTE — PLAN OF CARE
Pt admitted 4/29 for scheduled chest tube placement due to persistent loculated pleural effusion. PMH includes ILD s/p bilateral lung tx 2018 c/b bronchial stenosis requiring dilations, aspergillus empyeme CMV, HTN, DVT, AFib, SVT, and ESRD on HD MWF.     Neuro: A&O x4. Afebrile. Denies headache. Slept well overnight.  Cardiac: Pt in SR with intermittent AFib and rates 70's-100s. 's-140's. Denies dizziness, chest pain, or palpitations.   Respiratory: Sating well on RA. Rhonci noted on auscultation. Persistent productive cough. Pigtail chest tube to suction with no output overnight.   GI/: Low output on HD. Last BM 5/4. Denies nausea.  Diet/Appetite: Regular  Skin: WNL  LDA: R PIV SL  Activity: Up ad diego.  Pain: Denies     Plan: Continue to monitor and alert team with any changes or concerns.

## 2021-05-05 NOTE — PROGRESS NOTES
[facility]    Medicine Progress Note - Hospitalist Service       Date of Admission:  4/29/2021  Assessment & Plan      Kecia Blue is a 60 year old female with past medical history significant for ILD s/p bilateral lung transplant (3/1/2018) 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  Pleural fluid cx obtained.    - s/p bronchoscopy on 4/30 w/ Dr. Norris for possible dilation and stent/tissue debulking. (plan on repeat bronchoscopy in about 2 months to assess whether further tissue debulking is beneficial.)  - Transplant Pulmonary following, appreciate recommendations (Details of immunosuppresion as in there note)  - IS: continue Prednisone 5mg/2.5mg, Tacro (goal 8-10)   - Continue PTA Posaconazole 300mg daily   - Continue PTA Bactrim on MWF   - Continue Albuterol nebs BID  - Continue Pulmicort nebs BID   - Continue Flonase daily   - Follow up CT chest in 7/2021 per Transplant ID     * Follow up cultures. 1,3 Beta D glucan fungitell + (D/W Pulm who have d/w transplant ID)  - Plan for pleurodesis in next few days- thoracic Sx consult placed.     # ESRD on HD MWF - Follows w/ [facility] under Dr. Glasgow via AVF/G.  On HD since 11/2019.  Non-oliguiric, mostly urinates in the morning hours.    - Nephrology consult to continue HD      # Hx PAF, SVT - Per chart review, hx  "of AF w/ RVR during periods of acute illness and acute respiratory failure.  Follows w/ Cards EP, last seen by Dr. Mcmahan on 4/21.  Outpt Ziopatch study neg for AF, though did have frequent runs of SVT but was asymptomatic.  Currently on Metoprolol 25mg BID for rate control.  [id number] 1.  Per Cardiology, no indication for anticoagulation at this time.   - Continue PTA Metoprolol w/ hold parameters   - Follow up with Cards EP as needed     # Hypomagnesemia -   Currently on Slow-Mag 71.5-119mg on non-HD days.   - Continue PTA Slow-mag supplementation   - Ensure stable lytes given hx SVT, PAF      # Anemia - Likely 2/2 ESRD.       Diet: Regular Diet Adult    DVT Prophylaxis: Pneumatic Compression Devices  Basilio Catheter: not present  Code Status: Full Code           Disposition Plan   Expected discharge: 2 - 3 days, recommended to prior living arrangement once breahting stable and plan for chest tube.  Entered: Morgan Downey MD 05/04/2021, 9:21 PM      The patient's care was discussed with the Bedside Nurse, Care Coordinator/ and Patient.    Morgan Downey MD  Hospitalist Service  [facility]  Contact information available via [facility] Paging/Directory  Please see sign in/sign out for up to date coverage information  ______________________________________________________________________    Interval History   No acute events overnight, afebrile and hds, awaiting CT     Data reviewed today: I reviewed all medications, new labs and imaging results over the last 24 hours. I personally reviewed the chest x-ray image(s) showing as below.    Physical Exam   /75 (BP Location: Right arm)   Pulse 96   Temp 98.3  F (36.8  C) (Oral)   Resp 20   Ht 1.6 m (5' 3\")   Wt 50.4 kg (111 lb 1.8 oz)   LMP 06/07/2014 (Exact Date)   SpO2 100%   BMI 19.68 kg/m    Gen- pleasant  lying in bed  HEENT- NAD, DILIP  Neck- supple, no JVD elevation, no thyromegaly  CVS: " RRR,  RS- CTAB, decreased at base with LL - chest tube   Abdo- soft, no tenderness . No g/r/r  Ext- no edema   CNS- no focal signs     Data    BMP  Recent Labs   Lab 05/03/21  0640 05/02/21  0715 05/01/21  0654 04/30/21  0606   * 132* 132* 136   POTASSIUM 5.5* 5.0 4.3 4.1   CHLORIDE 100 97 98 101   CHELSEY 7.8* 8.3* 9.0 8.6   CO2 23 26 28 27   BUN 85* 66* 33* 45*   CR 4.55* 4.12* 2.72* 3.40*   * 122* 229* 130*     CBC  Recent Labs   Lab 05/02/21  0715 04/30/21  0606 04/29/21  1020   WBC 7.5 6.8 6.3   RBC 3.47* 3.41* 3.45*   HGB 10.6* 10.9* 11.1*   HCT 33.8* 34.4* 34.5*   MCV 97 101* 100   MCH 30.5 32.0 32.2   MCHC 31.4* 31.7 32.2   RDW 13.9 13.9 13.9    216 218     INR  Recent Labs   Lab 04/29/21  1020   INR 0.99         Recent Results (from the past 24 hour(s))   XR Chest 2 Views    Narrative    EXAM: XR CHEST 2 VW  5/4/2021 8:48 AM     HISTORY:  s/p BLTx, recurrent left pleural effusion s/p chest tube  placement       COMPARISON:  Chest x-ray 5/3/2021    FINDINGS:   PA and lateral radiographs of the chest. Postsurgical changes of  bilateral lung transplant with intact median sternotomy wires. Stable  position of left sided chest tube. Trachea is midline. Cardiac  silhouette is stable in size. Grossly unchanged streaky perihilar  opacities. No significant pleural effusion. No pneumothorax. No new  airspace opacities.      Impression    IMPRESSION:   1. Grossly unchanged streaky perihilar opacities, likely  atelectasis/edema.  2. Left-sided chest tube in place without appreciable pleural effusion  or pneumothorax.    I have personally reviewed the examination and initial interpretation  and I agree with the findings.    ADAM GONZALEZ MD   CT Chest w/o Contrast    Narrative    EXAMINATION: Chest CT  5/4/2021 10:45 AM    CLINICAL HISTORY: S/p lung transplant, persistent aspergillus left  empyema, pleural effusion, s/p left chest pig tail catheter    COMPARISON: CT chest 4/29/2021 and  4/8/2021.    TECHNIQUE: CT imaging obtained through the chest without contrast.  Axial, coronal, and sagittal reconstructions and axial MIP reformatted  images are reviewed.     CONTRAST: None    FINDINGS:  Lungs: Postsurgical changes of bilateral lung transplant. No  significant change in the mild right bronchial anastomotic narrowing.  The airway is otherwise patent. Mild right-sided bronchial wall  thickening. Accessory azygos lobe fissure on the right. Decreased size  of the peripherally calcified loculated gas and fluid collection in  the medial left lower lobe with left chest tube coiled in the  collection. Dense retained amphotericin bead within the dependent  portion of the collection. Bilateral pleural thickening. No  significant pleural effusion. No pneumothorax. Patchy groundglass  opacities and interlobular septal thickening throughout the bilateral  lower lobes, right greater than left, which have slightly decreased  compared to CT of the chest on 4/8/2021. No new airspace  consolidation.    Mediastinum: The thyroid is unremarkable. Cardiac size is enlarged. No  pericardial effusion. Normal caliber of the aorta and main pulmonary  artery. No enlarged thoracic lymph nodes. Esophagus is normal in  caliber.    Bones and soft tissues: No suspicious bone findings. Stable anterior  wedge compression deformity of T5. Intact median sternotomy wires.    Upper Abdomen: Limited evaluation of the upper abdomen. Cholelithiasis  without pericholecystic inflammatory changes.      Impression    IMPRESSION:   1. Decreased size of the peripherally calcified chronic empyema within  the medial left lower lobe with left sided chest tube in place.  Retained amphotericin bead is within the collection.  2. Cardiomegaly with basilar predominance groundglass opacities and  interlobular septal thickening, compatible with pulmonary edema.  3. Postsurgical changes of bilateral lung transplant with unchanged  mild right bronchial  anastomotic narrowing.    I have personally reviewed the examination and initial interpretation  and I agree with the findings.    ADAM GONZALEZ MD

## 2021-05-05 NOTE — PROGRESS NOTES
"  Nephrology Progress Note  05/05/2021         Assessment & Recommendations:   Kecia Blue is a 60 year old female with PMH of ILD s/p b/l lung transplant (2018) c/b R mainstem bronchial stenosis requiring dilations, Aspergillus empyema, and CMV viremia, HTN, hx postop DVT not currently on anticoagulation, hx pAfib, SVT, ESRD, admitted for scheduled left sided chest tube placement due to persistent loculated pleural effusion.      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 Glasgow. Access: LUE AVG. Run time: 3.5 hrs. EDW: 51 kg   - Dialysis per MWF schedule    Volume/BP: fairly well controlled  - EDW 51 kg with usual OP UF 2 to 2.6 kg  - Will challenged EDW given pulm edema on imaging  - Will re-establish based on post HD weight today    BMD: Ca 8.8, no alb or phos 5.9; on paricalcitol 2 mcg per HD  - Continue paricalcitol via HD    Anemia: hgb > 10 g/dL, no indication for TASH    Recurrent L pleural effusion with fungal empyema  -  s/p L chest tube 4/29, and bronch 4/30  - On long term posaconazole  - Plan is for possible pleurodesis this week    Recommendations were communicated to primary team via this note     MERRICK Coronado  [phone]    Interval History :   Seen on dialysis, gently challenging EDW as able. Chest tube in place, may have pleurodesis this week. Denies n/v, CP, SOB, chills    Review of Systems:   4 point ROS neg other than as noted above.    Physical Exam:   I/O last 3 completed shifts:  In: 840 [P.O.:840]  Out: 6 [Chest Tube:6]   BP (!) 147/86   Pulse 91   Temp 97  F (36.1  C)   Resp 24   Ht 1.6 m (5' 3\")   Wt 50.4 kg (111 lb 1.8 oz)   LMP 06/07/2014 (Exact Date)   SpO2 100%   BMI 19.68 kg/m       GENERAL APPEARANCE: alert, NAD  EYES:  no scleral icterus, pupils equal  HENT: mouth without ulcers or lesions  PULM: diminished, L chest tube  CV: regular rhythm, normal rate     -edema no " peripheral  GI: soft   INTEGUMENT: no cyanosis   NEURO:  A/o3  Access LUE AVG    Labs:   All labs reviewed by me  Electrolytes/Renal -   Recent Labs   Lab Test 05/05/21  0707 05/03/21  0640 05/02/21  0715 04/30/21  0606 04/30/21  0606 04/29/21  1833 04/08/21  0722 03/29/21 03/04/21  0611    132* 132*   < > 136  --  138 141 133   POTASSIUM 4.5 5.5* 5.0   < > 4.1  --  3.7 4.3 4.5   CHLORIDE 103 100 97   < > 101  --  101 101 98   CO2 25 23 26   < > 27  --  32 25 23   BUN 53* 85* 66*   < > 45*  --  22 49.5  11.00 83*   CR 3.62* 4.55* 4.12*   < > 3.40*  --  2.49* 4.50 3.98*   * 129* 122*   < > 130*  --  138* 122* 127*   CHELSEY 8.8 7.8* 8.3*   < > 8.6  --  7.9* 7.9 7.9*   MAG  --   --   --   --  1.7 1.6 1.5* 1.4 1.6   PHOS  --  5.9*  --   --   --   --   --  5.8 6.1*    < > = values in this interval not displayed.       CBC -   Recent Labs   Lab Test 05/02/21  0715 04/30/21  0606 04/29/21  1020   WBC 7.5 6.8 6.3   HGB 10.6* 10.9* 11.1*    216 218       LFTs -   Recent Labs   Lab Test 05/01/21  0654 03/29/21 03/01/21  0637 02/27/21  0853   ALKPHOS  --  288 339* 326*   BILITOTAL  --  0.9 0.7 0.4   ALT  --  36 49 44   AST  --  19 17 16   PROTTOTAL 7.2  --  6.8 6.6*   ALBUMIN  --  3.5 2.8* 2.5*       Iron Panel -   Recent Labs   Lab Test 02/03/21  0415 12/13/18  1033 08/01/18  0921   IRON 51 16* 93   IRONSAT 36 7* 37   YOLA  --  302* 571*         Imaging:  Reviewed    Current Medications:    acetylcysteine  2 mL Nebulization BID     albuterol  2.5 mg Nebulization BID     budesonide  0.5 mg Nebulization BID     fluticasone  1 spray Both Nostrils Daily     gelatin absorbable  1 each Topical During Hemodialysis (from stock)     magnesium chloride  1,605 mg Oral Once per day on Sun Tue Thu Sat     metoprolol tartrate  25 mg Oral or Feeding Tube BID     multivitamin RENAL  1 tablet Oral Daily with supper     - MEDICATION INSTRUCTIONS -   Does not apply Once     pantoprazole  40 mg Oral QAM AC     paricalcitol  2  mcg Intravenous Once in dialysis     posaconazole  300 mg Oral Daily     predniSONE  2.5 mg Oral QPM     predniSONE  5 mg Oral QAM     sodium chloride (PF)  3 mL Intracatheter Q8H     sulfamethoxazole-trimethoprim  1 tablet Oral Q Mon Wed Fri AM     tacrolimus  0.5 mg Oral BID IS       - MEDICATION INSTRUCTIONS -       Adriana Jarvis PA-C

## 2021-05-05 NOTE — PROGRESS NOTES
HEMODIALYSIS TREATMENT NOTE    Date: 5/5/2021  Time: 2:44 PM    Data:  Pre Wt: 50.4 kg (111 lb 1.8 oz)   Desired Wt: 47.4 kg   Post Wt: 48.6 kg (107 lb 2.3 oz)  Weight change: 1.8 kg  Ultrafiltration - Post Run Net Total Removed (mL): 1800 mL  Vascular Access Status: patent  Dialyzer Rinse: Streaked  Total Blood Volume Processed: 75.48 L Liters  Total Dialysis (Treatment) Time: 3.5 Hours    Lab:   no  Interventions/Assessment:  HD initiate via L avf, 15g needles, 400bfr/600dfr, 3.5hour run, 3kg ufg. Patient tolerated tx well, net 1.8 removed according to standing scale. bp remained above parameters, hemostasis achieved after 10mins. Report given to primary RN.      Plan:    As per renal

## 2021-05-06 LAB
BACTERIA SPEC CULT: NORMAL
INTERPRETATION ECG - MUSE: NORMAL
KOH PREP SPEC: ABNORMAL
KOH PREP SPEC: ABNORMAL
Lab: NORMAL
SPECIMEN SOURCE: ABNORMAL
SPECIMEN SOURCE: NORMAL
TACROLIMUS BLD-MCNC: 6.4 UG/L (ref 5–15)
TME LAST DOSE: NORMAL H

## 2021-05-06 PROCEDURE — 99233 SBSQ HOSP IP/OBS HIGH 50: CPT | Performed by: INTERNAL MEDICINE

## 2021-05-06 PROCEDURE — 250N000012 HC RX MED GY IP 250 OP 636 PS 637: Performed by: NURSE PRACTITIONER

## 2021-05-06 PROCEDURE — 36415 COLL VENOUS BLD VENIPUNCTURE: CPT | Performed by: INTERNAL MEDICINE

## 2021-05-06 PROCEDURE — 214N000001 HC R&B CCU UMMC

## 2021-05-06 PROCEDURE — 250N000012 HC RX MED GY IP 250 OP 636 PS 637: Performed by: INTERNAL MEDICINE

## 2021-05-06 PROCEDURE — 250N000009 HC RX 250: Performed by: NURSE PRACTITIONER

## 2021-05-06 PROCEDURE — 94640 AIRWAY INHALATION TREATMENT: CPT | Mod: 76

## 2021-05-06 PROCEDURE — 99233 SBSQ HOSP IP/OBS HIGH 50: CPT | Mod: 24 | Performed by: INTERNAL MEDICINE

## 2021-05-06 PROCEDURE — 99207 PR CDG-CUT & PASTE-POTENTIAL IMPACT ON LEVEL: CPT | Performed by: INTERNAL MEDICINE

## 2021-05-06 PROCEDURE — 999N000157 HC STATISTIC RCP TIME EA 10 MIN

## 2021-05-06 PROCEDURE — 94640 AIRWAY INHALATION TREATMENT: CPT

## 2021-05-06 PROCEDURE — 80197 ASSAY OF TACROLIMUS: CPT | Performed by: INTERNAL MEDICINE

## 2021-05-06 PROCEDURE — 250N000013 HC RX MED GY IP 250 OP 250 PS 637: Performed by: NURSE PRACTITIONER

## 2021-05-06 RX ORDER — LIDOCAINE 40 MG/G
CREAM TOPICAL
Status: DISCONTINUED | OUTPATIENT
Start: 2021-05-06 | End: 2021-05-07 | Stop reason: HOSPADM

## 2021-05-06 RX ORDER — TACROLIMUS 0.5 MG/1
0.5 CAPSULE ORAL
Status: DISCONTINUED | OUTPATIENT
Start: 2021-05-07 | End: 2021-05-07 | Stop reason: HOSPADM

## 2021-05-06 RX ORDER — TACROLIMUS 1 MG/1
1 CAPSULE ORAL
Status: DISCONTINUED | OUTPATIENT
Start: 2021-05-06 | End: 2021-05-07 | Stop reason: HOSPADM

## 2021-05-06 RX ADMIN — PREDNISONE 5 MG: 5 TABLET ORAL at 09:05

## 2021-05-06 RX ADMIN — ACETYLCYSTEINE 2 ML: 200 SOLUTION ORAL; RESPIRATORY (INHALATION) at 20:40

## 2021-05-06 RX ADMIN — ACETYLCYSTEINE 2 ML: 200 SOLUTION ORAL; RESPIRATORY (INHALATION) at 08:51

## 2021-05-06 RX ADMIN — BUDESONIDE 0.5 MG: 0.5 INHALANT RESPIRATORY (INHALATION) at 08:51

## 2021-05-06 RX ADMIN — PREDNISONE 2.5 MG: 2.5 TABLET ORAL at 19:42

## 2021-05-06 RX ADMIN — MAGNESIUM 64 MG (MAGNESIUM CHLORIDE) TABLET,DELAYED RELEASE 1605 MG: at 21:40

## 2021-05-06 RX ADMIN — B-COMPLEX W/ C & FOLIC ACID TAB 1 MG 1 TABLET: 1 TAB at 18:09

## 2021-05-06 RX ADMIN — ALBUTEROL SULFATE 2.5 MG: 2.5 SOLUTION RESPIRATORY (INHALATION) at 08:51

## 2021-05-06 RX ADMIN — METOPROLOL TARTRATE 25 MG: 25 TABLET, FILM COATED ORAL at 19:41

## 2021-05-06 RX ADMIN — BUDESONIDE 0.5 MG: 0.5 INHALANT RESPIRATORY (INHALATION) at 20:40

## 2021-05-06 RX ADMIN — ALBUTEROL SULFATE 2.5 MG: 2.5 SOLUTION RESPIRATORY (INHALATION) at 20:39

## 2021-05-06 RX ADMIN — PANTOPRAZOLE SODIUM 40 MG: 40 TABLET, DELAYED RELEASE ORAL at 09:05

## 2021-05-06 RX ADMIN — TACROLIMUS 1 MG: 1 CAPSULE ORAL at 18:08

## 2021-05-06 RX ADMIN — METOPROLOL TARTRATE 25 MG: 25 TABLET, FILM COATED ORAL at 09:05

## 2021-05-06 RX ADMIN — POSACONAZOLE 300 MG: 100 TABLET, COATED ORAL at 09:05

## 2021-05-06 RX ADMIN — TACROLIMUS 0.5 MG: 0.5 CAPSULE ORAL at 09:05

## 2021-05-06 ASSESSMENT — ACTIVITIES OF DAILY LIVING (ADL)
ADLS_ACUITY_SCORE: 13

## 2021-05-06 NOTE — PLAN OF CARE
Admitted on 4/29 for scheduled left sided chest tube placement w/ IR due to persistent loculated pleural effusion. . Pmhx: ILD s/p bilateral lung transplant (3/1/2018) c/b R mainstem bronchial stenosis requiring dilations, Aspergillus empyema, and CMV viremia, HTN, hx postop DVT not currently on anticoagulation, hx PAF, SVT, ESRD on HD MWF, and anemia.    Code status: full     Team: gold 9  Changed: chest tube dressing changed   Running: chest tube to suction (-20)     Neuro: ao* 4  Cardiac/Tele:  SR/ST occasionally junctional   Respiratory: room air, productive cough, R side rhonchi lung sounds   GI/:urinating via toliet, LBM 5/5  Diet/Appetite: Regular  Skin: pigtail dressing CDI  LDAs: PIV  Activity: up ad diego  Pain: sahil Okeefe, RN  Time cared for 0700 - 2330

## 2021-05-06 NOTE — PROGRESS NOTES
[facility]    Medicine Progress Note - Hospitalist Service       Date of Admission:  4/29/2021  Assessment & Plan      Kecia Blue is a 60 year old female with past medical history significant for ILD s/p bilateral lung transplant (3/1/2018) 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  Pleural fluid cx obtained, fungal elements seen.   - s/p bronchoscopy on 4/30 w/ Dr. Norris for possible dilation and stent/tissue debulking. (plan on repeat bronchoscopy in about 2 months to assess whether further tissue debulking is beneficial.)  - Transplant Pulmonary following, appreciate recommendations (Details of immunosuppresion as in there note)  - IS: continue Prednisone 5mg/2.5mg, Tacro (goal 8-10)   - Continue PTA Posaconazole 300mg daily   - Continue PTA Bactrim on MWF   - Continue Albuterol nebs BID  - Continue Pulmicort nebs BID   - Cotinue Flonase daily   - Plan for ampho beads pacement tomorrow     # ESRD on HD MWF - Follows w/ [facility] under Dr. Glasgow via AVF/G.  On HD since 11/2019.  Non-oliguiric, mostly urinates in the morning hours.    - Nephrology consult to continue HD      # Hx PAF, SVT - Per chart review, hx of AF w/ RVR during periods of acute illness and acute respiratory failure.  Follows w/ Cards EP, last seen by Dr. Mcmahan on 4/21.  Outpt Ziopatch study  "neg for AF, though did have frequent runs of SVT but was asymptomatic.  Currently on Metoprolol 25mg BID for rate control.  [id number] 1.  Per Cardiology, no indication for anticoagulation at this time.   - Continue PTA Metoprolol w/ hold parameters   - Follow up with Cards EP as needed     # Hypomagnesemia -   Currently on Slow-Mag 71.5-119mg on non-HD days.   - Continue PTA Slow-mag supplementation   - Ensure stable lytes given hx SVT, PAF      # Anemia - Likely 2/2 ESRD.       Diet: Regular Diet Adult  NPO per Anesthesia Guidelines for Procedure/Surgery Except for: Meds    DVT Prophylaxis: Pneumatic Compression Devices  Basilio Catheter: not present  Code Status: Full Code           Disposition Plan   Expected discharge: Tomorrow, recommended to prior living arrangement once breahting stable and plan for chest tube.  Entered: Morgan Downey MD 05/06/2021, 3:24 PM      The patient's care was discussed with the Bedside Nurse, Care Coordinator/ and Patient.    Morgan Downey MD  Hospitalist Service  [facility]  Contact information available via [facility] Paging/Directory  Please see sign in/sign out for up to date coverage information  ______________________________________________________________________    Interval History   No acute events overnight, has no complaints, hoping to discharge tomorrow.     Data reviewed today: I reviewed all medications, new labs and imaging results over the last 24 hours. I personally reviewed the chest x-ray image(s) showing as below.    Physical Exam   /79 (BP Location: Right arm, Cuff Size: Adult Regular)   Pulse 87   Temp 97.7  F (36.5  C) (Oral)   Resp 18   Ht 1.6 m (5' 3\")   Wt 50.4 kg (111 lb 1.8 oz)   LMP 06/07/2014 (Exact Date)   SpO2 98%   BMI 19.68 kg/m    Gen- pleasant  lying in bed  HEENT- NAD, DILIP  Neck- supple, no JVD elevation, no thyromegaly  CVS: RRR,  RS- CTAB, decreased at base with LL - " chest tube   Abdo- soft, no tenderness . No g/r/r  Ext- no edema   CNS- no focal signs     Data    BMP  Recent Labs   Lab 05/05/21  0707 05/03/21  0640 05/02/21  0715 05/01/21  0654    132* 132* 132*   POTASSIUM 4.5 5.5* 5.0 4.3   CHLORIDE 103 100 97 98   CHELSEY 8.8 7.8* 8.3* 9.0   CO2 25 23 26 28   BUN 53* 85* 66* 33*   CR 3.62* 4.55* 4.12* 2.72*   * 129* 122* 229*     CBC  Recent Labs   Lab 05/02/21  0715 04/30/21  0606   WBC 7.5 6.8   RBC 3.47* 3.41*   HGB 10.6* 10.9*   HCT 33.8* 34.4*   MCV 97 101*   MCH 30.5 32.0   MCHC 31.4* 31.7   RDW 13.9 13.9    216     INR  No lab results found in last 7 days.      No results found for this or any previous visit (from the past 24 hour(s)).

## 2021-05-06 NOTE — CONSULTS
Interventional Radiology Adult/Peds IP Consult: Patient to be seen: Routine within 24 hours; Call back #: [phone]; Antioboitic bead placement; discussed with Dr. Medhat Mckeon; Requesting provider? Attending physician [[phone]]    Electronically signed by: Preet Jackson PA-C on 05/06/21 8707     Call back # [phone]     Reason for Consult Antioboitic bead placement; discussed with Dr. Medhat Mckeon     Is the patient on an anticoagulant ? No    Is the patient currently NPO ? No      ===    Review of Epic entered chart data shows the patient is on no anticoagulation    Platelets = 214  INR = 0.99    COVID-19 PCR results = undetected 4/26    ===    Per IR Dr. Medhat cMkeon. Over-the-wire exchange of chest tube for peel-away for instillation of medication followed by replacement of chest tube, with either Dr. Mckeon or Dr. DARIA Sanchez.

## 2021-05-06 NOTE — PROGRESS NOTES
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 Recent admission - (discharged from ARU 3/4/21) with PJP pneumonia, ARDS, and Septic shock. Admitted 21 for thoracentesis and chest tube placement to loculated left pleural effusion with possible future pleurodesis, and bronchoscopy with possible dilation/stent/tissue debulking on  with Dr. Norris.     Recommendations:  - Tacrolimus level was 6.4, subtherapeutic.  Goal is 8-10. Will increase dose to 0.5mg qAM, 1mg qPM. Recheck level 5/9 if still inpatient (ordered).   - KOH smear showed many fungal elements. Discussed at our multidisciplinary transplant conference this morning, decision was made for re-instillation of amphotericin B beads in lieu of pleurodesis.      Aspergillus empyema (left-sided): Noted 10/8/19. CT scan on 20 with LLL increased pleural-based mass-like density, s/p needle aspiration (20) with Aspergillus fumigatus on cultures, s/p intrapleural amphotericin bead placement (). Negative cultures 2020 and again on 21. Pleural effusion has persisted with monitoring clinically off ABX and follows closely as OP with ID. Most recent BDG fungitell () positive at 311. Most recent ID visit () notes high concern for high morbidity with surgery and therefore planned continue to manage medically with long term posaconazole. Effusion persists, s/p planned left pleural  "chest tube placement and thoracentesis (4/29), with possible future pleurodesis. Remains afebrile, no leukocytosis.  - Bacterial pleural cultures negative to date.  - Pleural cultures (fungal, AFB) pending  -Pleural fluid LDH markedly elevated at 4706 with total protein 3.3, cholesterol 103 and triglycerides 70.  White blood cell count is 9460 but no differential because \"cells too damaged to perform differential\".  Markedly elevated LDH, may reflect lysed cells.  Fluid consistent with exudate.  - Continue PTA Posaconazole 300 mg TID   - A. galactomannan Ag and BDG fungitell  - Beta D glucan uptrending and KOH positive for many fungal elements. Defer pleurodesis and re-instillation of amphotericin B beads planned for tomorrow.   - Left pleural chest tube to water seal.     Right main bronchial stenosis s/p dilatation: Follow with Dr. Norris for serial bronchoscopies with dilation. Last bronch 2/19/21.  -Right mainstem/bronchus intermedius dilation and debulking with good bronchoscopic results and no complications noted.  - Airway clearance with IS, Aerobika, and Nebs: Mucomyst, Albuterol, and Pulmicort BID     S/p bilateral lung transplant for ILD 2/2 CTD:  Recent admission, as above, with Stenotrophomonas, Eikenella, and PJP pneumonia. Last seen in pulmonary clinic 4/8/21 (virtual). PFTs at that time improved from prior, still below post transplant best.  Most recent DSA (1/25) and CMV (4/22) not detected.  -Chest x-ray (5/1) reviewed by me with left chest tube in good position with near complete resolution of the pleural collection and decreased right basilar atelectasis.  - DSA (5/1) pending     Immunosuppression: On 2 drug IST d/t recurrent infections  - Tacrolimus level (4/30) supratherapeutic at 15.3 (10 hr). Goal level 8-10.   Dose reduced to 0.5 mg BID. Repeat level 5/3 (ordered)  - Prednisone 5 mg qAM / 2.5 mg qPM     Prophylaxis:   - Bactrim MWF PPx     EBV viremia:  Level 12/9/20 mildly elevated to 10K (log " "4) from prior 3K (3.5 log) on 9/9/20, repeat (1/25/21) decreased to 5619 copies (log of 3.8). Most recent level 2/22 elevated to 75K/log 4.9, thought to be secondary to acute illness and recent steroid burst. CT chest (w/o contrast), with no thoracic adenopathy.   - Repeat EBV pending     Other relevent problems managed by primary team:     CKD stage IV: Creatinine stable on admission at 3.4. Recent baseline 2.4-3.4 and has worsened since 2/2021. Dialysis MWF.  - Will continue to monitor tacrolimus levels  - Dialysis per nephrology     We appreciate the excellent care provided by the [place] [id number] team.  Recommendations communicated via in person rounding and this note.  Will continue to follow along closely, please do not hesitate to call with any questions or concerns.      Clarisse Roberson MD [facility]     Subjective & Interval History:     No events overnight.  Still feels good, no difficulty breathing.     Review of Systems:     Please see HPI, otherwise the complete 10 point ROS is negative.     Physical Exam:     Vital signs:  Temp: 97.7  F (36.5  C) Temp src: Oral BP: 127/79 Pulse: 87   Resp: 18 SpO2: 98 % O2 Device: None (Room air) Oxygen Delivery: 2.5 LPM Height: 160 cm (5' 3\") Weight: 50.4 kg (111 lb 1.8 oz)  I/O:     Intake/Output Summary (Last 24 hours) at 5/6/2021 1440  Last data filed at 5/6/2021 1100  Gross per 24 hour   Intake 1160 ml   Output 127 ml   Net 1033 ml       GENERAL: alert, NAD  HEENT: NCAT, no icterus  Neck: no cervical or supraclavicular adenopathy  Lungs: good air flow, mild inspiratory crackles  CV: RRR, S1S2, no murmurs noted  Abdomen: normoactive BS, soft, non tender  Neuro: AAO X 3  Psychiatric: normal affect, good eye contact  Skin: no rash, jaundice or lesions on limited exam  Extremities: No clubbing, cyanosis or edema.      Lines, Drains, and Devices:  Peripheral IV 05/04/21 Right;Anterior Lower forearm (Active)   Site Assessment WDL 05/06/21 0900   Line Status Saline locked 05/06/21 " 0900   Phlebitis Scale 0-->no symptoms 05/06/21 0900   Infiltration Scale 0 05/06/21 0900   Number of days: 2       Hemodialysis Vascular Access Arteriovenous fistula Left Arm (Active)   Site Assessment WDL 05/06/21 0900   Cannulation Needle Size 15 05/05/21 1000   Dressing Intervention New dressing 05/04/21 0900   Dressing Status Clean, dry, intact 05/06/21 0000   Hand Off Report Yes 05/03/21 2123   Number of days: 101     Data:     LABS    CMP:   Recent Labs   Lab 05/05/21  0707 05/03/21  0640 05/02/21  0715 05/01/21  0654 04/30/21  0606 04/29/21  1833 04/29/21  1833    132* 132* 132* 136   < >  --    POTASSIUM 4.5 5.5* 5.0 4.3 4.1   < >  --    CHLORIDE 103 100 97 98 101   < >  --    CO2 25 23 26 28 27   < >  --    ANIONGAP 8 9 9 7 8   < >  --    * 129* 122* 229* 130*   < >  --    BUN 53* 85* 66* 33* 45*   < >  --    CR 3.62* 4.55* 4.12* 2.72* 3.40*   < >  --    GFRESTIMATED 13* 10* 11* 18* 14*   < >  --    GFRESTBLACK 15* 11* 13* 21* 16*   < >  --    CHELSEY 8.8 7.8* 8.3* 9.0 8.6   < >  --    MAG  --   --   --   --  1.7  --  1.6   PHOS  --  5.9*  --   --   --   --   --    PROTTOTAL  --   --   --  7.2  --   --   --     < > = values in this interval not displayed.     CBC:   Recent Labs   Lab 05/02/21  0715 04/30/21  0606   WBC 7.5 6.8   RBC 3.47* 3.41*   HGB 10.6* 10.9*   HCT 33.8* 34.4*   MCV 97 101*   MCH 30.5 32.0   MCHC 31.4* 31.7   RDW 13.9 13.9    216       INR: No lab results found in last 7 days.    Glucose:   Recent Labs   Lab 05/05/21  0707 05/03/21  0640 05/02/21  0715 05/01/21  0654 04/30/21  1547 04/30/21  0606   * 129* 122* 229*  --  130*   BGM  --   --   --   --  110*  --        Blood Gas: No lab results found in last 7 days.    Culture Data   Recent Labs   Lab 05/01/21  1102   CULT Culture negative after 4 days  Quantity not sufficient  Called to Maxim Norman at 1236 5/1/21.    mlb         Virology Data:   Lab Results   Component Value Date    FLUAH1 Not Detected 01/24/2021     FLUAH3 Not Detected 01/24/2021    KH4175 Not Detected 01/24/2021    IFLUB Not Detected 01/24/2021    RSVA Not Detected 01/24/2021    RSVB Not Detected 01/24/2021    PIV1 Not Detected 01/24/2021    PIV2 Not Detected 01/24/2021    PIV3 Not Detected 01/24/2021    HMPV Not Detected 01/24/2021    HRVS Negative 01/24/2021    ADVBE Negative 01/24/2021    ADVC Negative 01/24/2021    ADVC Negative 12/23/2020    ADVC Negative 10/07/2019       Historical CMV results (last 3 of prior testing):  Lab Results   Component Value Date    CMVQNT CMV DNA Not Detected 02/25/2021    CMVQNT <137 (A) 01/25/2021    CMVQNT 238 (A) 01/24/2021     Lab Results   Component Value Date    CMVLOG Not Calculated 02/25/2021    CMVLOG <2.1 01/25/2021    CMVLOG 2.4 (H) 01/24/2021       Urine Studies    Recent Labs   Lab Test 01/24/21  1729 10/21/19  2240   URINEPH 5.0 5.0   NITRITE Negative Negative   LEUKEST Moderate* Large*   WBCU 34* 115*       Most Recent [place] Pulmonary Function Testing (FVC/FEV1 only)  FVC-Pre   Date Value Ref Range Status   04/08/2021 1.41 L    12/09/2020 1.12 L    09/09/2020 1.56 L    03/09/2020 1.57 L      FVC-%Pred-Pre   Date Value Ref Range Status   04/08/2021 43 %    12/09/2020 34 %    09/09/2020 47 %    03/09/2020 48 %      FEV1-Pre   Date Value Ref Range Status   04/08/2021 1.09 L    12/09/2020 0.98 L    09/09/2020 1.10 L    03/09/2020 0.96 L      FEV1-%Pred-Pre   Date Value Ref Range Status   04/08/2021 42 %    12/09/2020 37 %    09/09/2020 42 %    03/09/2020 37 %        IMAGING    No results found for this or any previous visit (from the past 48 hour(s)).

## 2021-05-06 NOTE — PLAN OF CARE
Major Shift Events:  VSS, slight rhonchi continues on right side. Chest tube w/ no output from 1154-9632. No complaints of pain, slept through night.    Plan: Determine plan for chest tube.    For vital signs and complete assessments, please see documentation flowsheets.

## 2021-05-06 NOTE — PROGRESS NOTES
Cardiothoracic Surgery Progress Note - 05/06/21  Pt: Kecia Blue  MRN: [id number]       Temp:  [97.7  F (36.5  C)-98.3  F (36.8  C)] 97.7  F (36.5  C)  Pulse:  [] 87  Resp:  [16-18] 18  BP: (127-144)/(78-90) 127/79  SpO2:  [98 %-100 %] 98 %    I/O last 3 completed shifts:  In: 800 [P.O.:800]  Out: 1804 [Other:1800; Chest Tube:4]    Resp: 18      Physical Exam:  Gen: Alert and oriented. No acute distress.  PulmRespirations non labored.  CV: Regular rate and rhythm.  Chest: Drain in place with scant output.   Abd: Abdomen soft, non-tender, non-distended    Labs reviewed in full; available in Epic.  Results for orders placed or performed during the hospital encounter of 04/29/21 (from the past 24 hour(s))   EKG 12-lead, complete   Result Value Ref Range    Interpretation ECG Click View Image link to view waveform and result    Koh prep    Specimen: Pleural fluid   Result Value Ref Range    Specimen Description Pleural fluid     KOH Prep Many  Fungal elements seen   (A)     TAMIKA Prep       Critical Value/Significant Value called to and read back by  Kecia Blue, RN [id number] 05.05.2021 NM     Tacrolimus level   Result Value Ref Range    Tacrolimus Last Dose Not Provided     Tacrolimus Level 6.4 5.0 - 15.0 ug/L     *Note: Due to a large number of results and/or encounters for the requested time period, some results have not been displayed. A complete set of results can be found in Results Review.       A/P  Kecia Blue is a 60 year old female patient s/p BSLT with chronic left Aspergillus empyema, loculated.     -KOH staining was positive. Pleurodesis was aborted.   -After discussion of her case at multidisciplinary conference we decided to proceed with repeat antifungal bead placement with IR assistance. I will arrange for procedure tomorrow.     Caryn Lao MD       I spent a total of 40 minutes with Ms. Blue, reviewing her chart, laboratory and imaging studies.  The patient had all  questions answered and was in agreement with the plan.

## 2021-05-06 NOTE — PLAN OF CARE
No significant change in status today; independent with cares in room, denies pain. Pigtail CT to sxn in place with no output today.   NPO after MN for ampho bead placement  procedure in IR 5/7 afternoon with scheduled dialysis run first thing tomorrow morning beforehand. HD aware of pt's schedule tomorrow and is planning on having her run 1st shift Friday.

## 2021-05-07 ENCOUNTER — APPOINTMENT (OUTPATIENT)
Dept: INTERVENTIONAL RADIOLOGY/VASCULAR | Facility: CLINIC | Age: 61
End: 2021-05-07
Attending: PHYSICIAN ASSISTANT
Payer: COMMERCIAL

## 2021-05-07 ENCOUNTER — PATIENT OUTREACH (OUTPATIENT)
Dept: CARE COORDINATION | Facility: CLINIC | Age: 61
End: 2021-05-07

## 2021-05-07 VITALS
TEMPERATURE: 97.7 F | DIASTOLIC BLOOD PRESSURE: 86 MMHG | WEIGHT: 111.77 LBS | BODY MASS INDEX: 19.8 KG/M2 | RESPIRATION RATE: 12 BRPM | HEIGHT: 63 IN | SYSTOLIC BLOOD PRESSURE: 121 MMHG | OXYGEN SATURATION: 100 % | HEART RATE: 88 BPM

## 2021-05-07 LAB
ANION GAP SERPL CALCULATED.3IONS-SCNC: 10 MMOL/L (ref 3–14)
BUN SERPL-MCNC: 66 MG/DL (ref 7–30)
CALCIUM SERPL-MCNC: 8.1 MG/DL (ref 8.5–10.1)
CHLORIDE SERPL-SCNC: 102 MMOL/L (ref 94–109)
CO2 SERPL-SCNC: 23 MMOL/L (ref 20–32)
CREAT SERPL-MCNC: 3.9 MG/DL (ref 0.52–1.04)
GFR SERPL CREATININE-BSD FRML MDRD: 12 ML/MIN/{1.73_M2}
GLUCOSE SERPL-MCNC: 125 MG/DL (ref 70–99)
LACTATE BLD-SCNC: 1.2 MMOL/L (ref 0.7–2)
POTASSIUM SERPL-SCNC: 4.3 MMOL/L (ref 3.4–5.3)
SODIUM SERPL-SCNC: 136 MMOL/L (ref 133–144)

## 2021-05-07 PROCEDURE — 250N000013 HC RX MED GY IP 250 OP 250 PS 637: Performed by: NURSE PRACTITIONER

## 2021-05-07 PROCEDURE — 250N000009 HC RX 250: Performed by: NURSE PRACTITIONER

## 2021-05-07 PROCEDURE — 250N000012 HC RX MED GY IP 250 OP 636 PS 637: Performed by: INTERNAL MEDICINE

## 2021-05-07 PROCEDURE — 250N000011 HC RX IP 250 OP 636: Performed by: STUDENT IN AN ORGANIZED HEALTH CARE EDUCATION/TRAINING PROGRAM

## 2021-05-07 PROCEDURE — 94640 AIRWAY INHALATION TREATMENT: CPT

## 2021-05-07 PROCEDURE — 36415 COLL VENOUS BLD VENIPUNCTURE: CPT | Performed by: NURSE PRACTITIONER

## 2021-05-07 PROCEDURE — C1769 GUIDE WIRE: HCPCS

## 2021-05-07 PROCEDURE — 71250 CT THORAX DX C-: CPT | Mod: 26 | Performed by: RADIOLOGY

## 2021-05-07 PROCEDURE — 999N000157 HC STATISTIC RCP TIME EA 10 MIN

## 2021-05-07 PROCEDURE — 99152 MOD SED SAME PHYS/QHP 5/>YRS: CPT | Mod: GC | Performed by: RADIOLOGY

## 2021-05-07 PROCEDURE — 272N000567 HC SHEATH CR4

## 2021-05-07 PROCEDURE — 250N000011 HC RX IP 250 OP 636: Performed by: NURSE PRACTITIONER

## 2021-05-07 PROCEDURE — 99233 SBSQ HOSP IP/OBS HIGH 50: CPT | Mod: 24 | Performed by: INTERNAL MEDICINE

## 2021-05-07 PROCEDURE — 258N000003 HC RX IP 258 OP 636: Performed by: INTERNAL MEDICINE

## 2021-05-07 PROCEDURE — 99153 MOD SED SAME PHYS/QHP EA: CPT

## 2021-05-07 PROCEDURE — 250N000011 HC RX IP 250 OP 636: Performed by: INTERNAL MEDICINE

## 2021-05-07 PROCEDURE — 99152 MOD SED SAME PHYS/QHP 5/>YRS: CPT

## 2021-05-07 PROCEDURE — 272N000569 HC SHEATH CR6

## 2021-05-07 PROCEDURE — 90937 HEMODIALYSIS REPEATED EVAL: CPT

## 2021-05-07 PROCEDURE — 49423 EXCHANGE DRAINAGE CATHETER: CPT

## 2021-05-07 PROCEDURE — 99239 HOSP IP/OBS DSCHRG MGMT >30: CPT | Performed by: INTERNAL MEDICINE

## 2021-05-07 PROCEDURE — 36415 COLL VENOUS BLD VENIPUNCTURE: CPT | Performed by: THORACIC SURGERY (CARDIOTHORACIC VASCULAR SURGERY)

## 2021-05-07 PROCEDURE — 80048 BASIC METABOLIC PNL TOTAL CA: CPT | Performed by: NURSE PRACTITIONER

## 2021-05-07 PROCEDURE — 83605 ASSAY OF LACTIC ACID: CPT | Performed by: THORACIC SURGERY (CARDIOTHORACIC VASCULAR SURGERY)

## 2021-05-07 PROCEDURE — 250N000013 HC RX MED GY IP 250 OP 250 PS 637: Performed by: THORACIC SURGERY (CARDIOTHORACIC VASCULAR SURGERY)

## 2021-05-07 PROCEDURE — 99207 PR SATISFY VISIT NUMBER: CPT | Performed by: RADIOLOGY

## 2021-05-07 PROCEDURE — 99233 SBSQ HOSP IP/OBS HIGH 50: CPT | Performed by: PHYSICIAN ASSISTANT

## 2021-05-07 PROCEDURE — 250N000012 HC RX MED GY IP 250 OP 636 PS 637: Performed by: NURSE PRACTITIONER

## 2021-05-07 PROCEDURE — C1729 CATH, DRAINAGE: HCPCS

## 2021-05-07 PROCEDURE — 3E0L329 INTRODUCTION OF OTHER ANTI-INFECTIVE INTO PLEURAL CAVITY, PERCUTANEOUS APPROACH: ICD-10-PCS | Performed by: RADIOLOGY

## 2021-05-07 PROCEDURE — 250N000009 HC RX 250: Performed by: INTERNAL MEDICINE

## 2021-05-07 RX ORDER — NALOXONE HYDROCHLORIDE 0.4 MG/ML
0.4 INJECTION, SOLUTION INTRAMUSCULAR; INTRAVENOUS; SUBCUTANEOUS
Status: DISCONTINUED | OUTPATIENT
Start: 2021-05-07 | End: 2021-05-07 | Stop reason: HOSPADM

## 2021-05-07 RX ORDER — NALOXONE HYDROCHLORIDE 0.4 MG/ML
0.2 INJECTION, SOLUTION INTRAMUSCULAR; INTRAVENOUS; SUBCUTANEOUS
Status: DISCONTINUED | OUTPATIENT
Start: 2021-05-07 | End: 2021-05-07 | Stop reason: HOSPADM

## 2021-05-07 RX ORDER — PARICALCITOL 5 UG/ML
2 INJECTION, SOLUTION INTRAVENOUS
Status: COMPLETED | OUTPATIENT
Start: 2021-05-07 | End: 2021-05-07

## 2021-05-07 RX ORDER — FLUMAZENIL 0.1 MG/ML
0.2 INJECTION, SOLUTION INTRAVENOUS
Status: DISCONTINUED | OUTPATIENT
Start: 2021-05-07 | End: 2021-05-07 | Stop reason: HOSPADM

## 2021-05-07 RX ORDER — ONDANSETRON 2 MG/ML
4 INJECTION INTRAMUSCULAR; INTRAVENOUS EVERY 6 HOURS PRN
Status: DISCONTINUED | OUTPATIENT
Start: 2021-05-07 | End: 2021-05-07

## 2021-05-07 RX ORDER — POSACONAZOLE 40 MG/ML
400 SUSPENSION ORAL ONCE
Status: COMPLETED | OUTPATIENT
Start: 2021-05-07 | End: 2021-05-07

## 2021-05-07 RX ORDER — FENTANYL CITRATE 50 UG/ML
25-50 INJECTION, SOLUTION INTRAMUSCULAR; INTRAVENOUS EVERY 5 MIN PRN
Status: DISCONTINUED | OUTPATIENT
Start: 2021-05-07 | End: 2021-05-07 | Stop reason: HOSPADM

## 2021-05-07 RX ADMIN — MIDAZOLAM 4 MG: 1 INJECTION INTRAMUSCULAR; INTRAVENOUS at 16:49

## 2021-05-07 RX ADMIN — BUDESONIDE 0.5 MG: 0.5 INHALANT RESPIRATORY (INHALATION) at 12:53

## 2021-05-07 RX ADMIN — Medication: at 08:45

## 2021-05-07 RX ADMIN — PARICALCITOL 2 MCG: 5 INJECTION, SOLUTION INTRAVENOUS at 10:29

## 2021-05-07 RX ADMIN — ONDANSETRON 4 MG: 2 INJECTION INTRAMUSCULAR; INTRAVENOUS at 16:50

## 2021-05-07 RX ADMIN — LIDOCAINE HYDROCHLORIDE 0.5 ML: 10 INJECTION, SOLUTION EPIDURAL; INFILTRATION; INTRACAUDAL; PERINEURAL at 08:44

## 2021-05-07 RX ADMIN — SODIUM CHLORIDE 300 ML: 9 INJECTION, SOLUTION INTRAVENOUS at 08:44

## 2021-05-07 RX ADMIN — SODIUM CHLORIDE 250 ML: 9 INJECTION, SOLUTION INTRAVENOUS at 08:45

## 2021-05-07 RX ADMIN — TACROLIMUS 0.5 MG: 0.5 CAPSULE ORAL at 07:24

## 2021-05-07 RX ADMIN — FENTANYL CITRATE 200 MCG: 50 INJECTION, SOLUTION INTRAMUSCULAR; INTRAVENOUS at 16:48

## 2021-05-07 RX ADMIN — ALBUTEROL SULFATE 2.5 MG: 2.5 SOLUTION RESPIRATORY (INHALATION) at 12:53

## 2021-05-07 RX ADMIN — POSACONAZOLE 400 MG: 40 SUSPENSION ORAL at 16:50

## 2021-05-07 RX ADMIN — PREDNISONE 5 MG: 5 TABLET ORAL at 07:24

## 2021-05-07 RX ADMIN — SULFAMETHOXAZOLE AND TRIMETHOPRIM 1 TABLET: 400; 80 TABLET ORAL at 07:24

## 2021-05-07 RX ADMIN — ACETYLCYSTEINE 2 ML: 200 SOLUTION ORAL; RESPIRATORY (INHALATION) at 12:53

## 2021-05-07 RX ADMIN — POSACONAZOLE 300 MG: 100 TABLET, COATED ORAL at 07:24

## 2021-05-07 RX ADMIN — PANTOPRAZOLE SODIUM 40 MG: 40 TABLET, DELAYED RELEASE ORAL at 07:24

## 2021-05-07 ASSESSMENT — ACTIVITIES OF DAILY LIVING (ADL)
ADLS_ACUITY_SCORE: 13

## 2021-05-07 ASSESSMENT — MIFFLIN-ST. JEOR: SCORE: 1056.13

## 2021-05-07 NOTE — PLAN OF CARE
DISCHARGE   Discharged to: Home  Via: Automobile  Accompanied by: Family  Discharge Instructions: diet, activity, medications, follow up appointments, when to call the MD, and what to watchout for (i.e. s/s of infection, increasing SOB, palpitations, chest pain,)  Prescriptions: To be filled by pharmacy per pt's request; medication list reviewed & sent with pt  Follow Up Appointments: arranged; information given  Belongings: All sent with pt  IV: out  Telemetry: off  Pt exhibits understanding of above discharge instructions; all questions answered.  Discharge Paperwork: faxed     S/p CT removal via IR and dressing C/D/I.  Fistula dressing C/D/I.  Triggered lactic acid and levels WNL.  SA/SR, now VS'S on RA and denied pain.  Otherwise, pt feeling great and excited about discharge.  Continue to monitor as out-pt.

## 2021-05-07 NOTE — PROGRESS NOTES
CLINICAL NUTRITION SERVICES - BRIEF NOTE    Reviewed risk factors for LOS. Per chart review, pt tolerating diet, consuming 100% of meals, ordering 2-3 meals per day. Unable to speak with patient today as she had dialysis and procedure scheduled. Per review of [facility], pt consumed 2345 kcals and 96 g protein on 5/6, 2450 kcal and 113 g protein on 5/5, 2170 kcal and 52 g protein on 5/4. Reviewed weight history: weight remain relatively stable during admission, some fluctuations likely due to fluid shifts as patient receives dialysis.     Follow-Up/Monitoring     RD to follow per protocol.    Salvador Bobo   Dietetic Intern

## 2021-05-07 NOTE — PLAN OF CARE
[id number]    Alert and oriented.  VSS on RA.  Afib in the 80-120s.  Denies pain.  Left CT to wall suction, with output of zero.  NPO since midnight.  Scheduled HD in the morning then to IR for Ampho B. Beads placement, and then possible discharge.

## 2021-05-07 NOTE — DISCHARGE SUMMARY
Dialysis Discharge Summary Brief    [facility]  Division of Nephrology  Nephrology Discharge Dialysis Orders  Ph: [phone]  Fax: [phone]    Kecia Blue  MRN: [id number]  YOB: 1960    [facility]  Primary Nephrologist: Dr. Glasgow    Date of Admission: 4/29/2021  Date of Discharge: anticipated 5/7 or 5/8/2021    Please page me at  with any questions. Thanks much. Adriana Jarvis PA-C    Kecia Blue is a 60 year old female with PMH of ILD s/p b/l lung transplant (2018) c/b R mainstem bronchial stenosis requiring dilations, Aspergillus empyema, and CMV viremia, HTN, hx postop DVT not currently on anticoagulation, hx pAfib, SVT, ESRD, admitted for scheduled left sided chest tube placement due to persistent loculated pleural effusion, having Ampho B beads placed in pleural space 5/7.       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 Glasgow. Access: LUE AVG. Run time: 3.5 hrs.    - Dialysis per MWF schedule     Volume/BP: fairly well controlled, EDW on admission 51 kg  - Will challenged EDW given pulm edema on imaging  - Re-establish EDW at 47.5 to 48 kg     BMD: Ca 8.8, no alb or phos 5.9; on paricalcitol 2 mcg per HD        Anemia: hgb > 10 g/dL, no indication for TASH     Recurrent L pleural effusion with fungal empyema  -  s/p L chest tube 4/29, and bronch 4/30  - On long term posaconazole  - Having Ampho B beads placed in pleural space today 5/7 and likely chest tube to be pulled       Discharge Diagnosis:    ICD-10-CM    1. S/P lung transplant (H)  Z94.2 CT Chest Tube with Cath Placement     CT Chest Tube with Cath Placement     CBC with platelets     INR     Anaerobic bacterial culture     Gram stain     Fluid Culture Aerobic Bacterial     Magnesium     Basic metabolic panel     CBC with platelets     Tacrolimus level     Magnesium      Magnesium     Hepatitis B Surface Antibody     Hepatitis B surface antigen     Cell count with differential fluid     Lactate dehydrogenase fluid     Protein fluid     AFB Culture Non Blood     AFB Stain Non Blood     Fungus Culture, non-blood     Cholesterol fluid     Triglyceride Fluid     1,3 Beta D glucan fungitell     Aspergillus Galactomannan Antigen     Glucose by meter     Glucose by meter     Lactate Dehydrogenase     Protein total     EBV DNA PCR Quantitative Whole Blood     Basic metabolic panel     Basic metabolic panel     CBC with platelets     Basic metabolic panel     COVID-19 Prabhakar RBD Madelin & Titer Reflex     Basic metabolic panel     Tacrolimus level     Phosphorus     Phosphorus     Basic metabolic panel     Koh prep     Tacrolimus level     Tacrolimus level     Basic metabolic panel     CANCELED: Magnesium     CANCELED: Basic metabolic panel     CANCELED: Phosphorus     CANCELED: Tacrolimus level   2. Loculated pleural effusion  J90 CT Chest Tube with Cath Placement     CT Chest Tube with Cath Placement   3. Bronchial stenosis, right  J98.09 Laser CO2 bronchoscopy     Laser CO2 bronchoscopy     DISCONTINUED: PRE OP antibiotics NOT needed for this surgical procedure.     CANCELED: NPO per Anesthesia Guidelines for Procedure/Surgery Except for: Meds     CANCELED: Notify Provider - Anticoagulants and Antiplatelets     CANCELED: Void on call to OR     CANCELED: Apply Pneumatic Compression Device (PCD)     CANCELED: Pneumatic Compression Device (PCD) (Equipment)     CANCELED: NPO per Anesthesia Guidelines for Procedure/Surgery Except for: Meds     CANCELED: Notify Provider - Anticoagulants and Antiplatelets     CANCELED: Void on call to OR     CANCELED: Apply Pneumatic Compression Device (PCD)     CANCELED: Pneumatic Compression Device (PCD) (Equipment)          [x] Resume all previous dialysis orders with exception as noted below    New Orders (if not applicable put NA):  Estimated Dry Weight 47.5 to  48 kg   Dialysis Duration    Dialysis Access    Antibiotics (dose per dialysis, end date)            Labs to be drawn at dialysis    Other major changes to dialysis prescription (e.g. Dialysate bath, heparin, blood flow rate, etc)      Medication changes (also fax the unit a copy of the discharge summary)              Name of physician completing this form: Adriana Jarvis PA-C

## 2021-05-07 NOTE — PROGRESS NOTES
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 Recent admission - (discharged from ARU 3/4/21) with PJP pneumonia, ARDS, and Septic shock. Admitted 21 for thoracentesis and chest tube placement to loculated left pleural effusion with possible future pleurodesis, and bronchoscopy with possible dilation/stent/tissue debulking on  with Dr. Norris.     Recommendations:  - Tacrolimus dose is 0.5mg qAM, 1mg qPM. Will check level next week as an outpatient.   - Plan for re-instillation of amphotericin B beads in lieu of pleurodesis, scheduled for today.   - OK to discharge today after chest tube is pulled.  We will arrange for f/u with transplant pulmonary.       Aspergillus empyema (left-sided): Noted 10/8/19. CT scan on 20 with LLL increased pleural-based mass-like density, s/p needle aspiration (20) with Aspergillus fumigatus on cultures, s/p intrapleural amphotericin bead placement (). Negative cultures 2020 and again on 21. Pleural effusion has persisted with monitoring clinically off ABX and follows closely as OP with ID. Most recent BDG fungitell () positive at 311. Most recent ID visit () notes high concern for high morbidity with surgery and therefore planned continue to manage medically with long term posaconazole. Effusion persists, s/p planned left pleural chest tube placement and thoracentesis (), with  "possible future pleurodesis. Remains afebrile, no leukocytosis.  - Bacterial pleural cultures negative to date.  - Pleural cultures (fungal, AFB) pending  -Pleural fluid LDH markedly elevated at 4706 with total protein 3.3, cholesterol 103 and triglycerides 70.  White blood cell count is 9460 but no differential because \"cells too damaged to perform differential\".  Markedly elevated LDH, may reflect lysed cells.  Fluid consistent with exudate.  - Continue PTA Posaconazole 300 mg TID   - A. galactomannan Ag and BDG fungitell  - Beta D glucan uptrending and KOH positive for many fungal elements. Defer pleurodesis and re-instillation of amphotericin B beads planned for today.   - Left pleural chest tube to water seal.     Right main bronchial stenosis s/p dilatation: Follow with Dr. Norris for serial bronchoscopies with dilation. Last bronch 2/19/21.  -Right mainstem/bronchus intermedius dilation and debulking with good bronchoscopic results and no complications noted.  - Airway clearance with IS, Aerobika, and Nebs: Mucomyst, Albuterol, and Pulmicort BID     S/p bilateral lung transplant for ILD 2/2 CTD:  Recent admission, as above, with Stenotrophomonas, Eikenella, and PJP pneumonia. Last seen in pulmonary clinic 4/8/21 (virtual). PFTs at that time improved from prior, still below post transplant best.  Most recent DSA (1/25) and CMV (4/22) not detected.  -Chest x-ray (5/1) reviewed by me with left chest tube in good position with near complete resolution of the pleural collection and decreased right basilar atelectasis.  - DSA (5/1) pending     Immunosuppression: On 2 drug IST d/t recurrent infections  - Tacrolimus level (4/30) supratherapeutic at 15.3 (10 hr). Goal level 8-10.   Dose reduced to 0.5 mg BID. Repeat level 5/3 (ordered)  - Prednisone 5 mg qAM / 2.5 mg qPM     Prophylaxis:   - Bactrim MWF PPx     EBV viremia:  Level 12/9/20 mildly elevated to 10K (log 4) from prior 3K (3.5 log) on 9/9/20, " "repeat (1/25/21) decreased to 5619 copies (log of 3.8). Most recent level 2/22 elevated to 75K/log 4.9, thought to be secondary to acute illness and recent steroid burst. CT chest (w/o contrast), with no thoracic adenopathy.   - Repeat EBV pending     Other relevent problems managed by primary team:     CKD stage IV: Creatinine stable on admission at 3.4. Recent baseline 2.4-3.4 and has worsened since 2/2021. Dialysis MWF.  - Will continue to monitor tacrolimus levels  - Dialysis per nephrology      Clarisse Roberson MD [facility]     Subjective & Interval History:     No complaints, still ambulating ad diego.  Occasional productive cough unchanged.      Review of Systems:     Please see HPI, otherwise the complete 10 point ROS is negative.     Physical Exam:     Vital signs:  Temp: 98  F (36.7  C) Temp src: Oral BP: (!) 192/85 Pulse: 111   Resp: 17 SpO2: 98 % O2 Device: None (Room air) Oxygen Delivery: 2.5 LPM Height: 160 cm (5' 3\") Weight: 50.7 kg (111 lb 12.4 oz)  I/O:     Intake/Output Summary (Last 24 hours) at 5/7/2021 1503  Last data filed at 5/7/2021 1400  Gross per 24 hour   Intake 836 ml   Output 3850 ml   Net -3014 ml       GENERAL: alert, NAD  HEENT: NCAT, no icterus  Neck: no cervical or supraclavicular adenopathy  Lungs: good air flow, mild inspiratory crackles  CV: RRR, S1S2, no murmurs noted  Abdomen: normoactive BS, soft, non tender  Neuro: AAO X 3  Psychiatric: normal affect, good eye contact  Skin: no rash, jaundice or lesions on limited exam  Extremities: No clubbing, cyanosis or edema.        Lines, Drains, and Devices:  Peripheral IV 05/04/21 Right;Anterior Lower forearm (Active)   Site Assessment [facility] 05/07/21 0700   Line Status Saline locked 05/07/21 0700   Phlebitis Scale 0-->no symptoms 05/07/21 0700   Infiltration Scale 0 05/07/21 0700   Number of days: 3       Hemodialysis Vascular Access Arteriovenous fistula Left Arm (Active)   Site Assessment [facility] 05/07/21 1200   Cannulation Needle Size 16 05/07/21 " 0840   Dressing Intervention New dressing 05/07/21 0840   Dressing Status Clean, dry, intact 05/06/21 0000   Hand Off Report Yes 05/03/21 2123   Number of days: 102     Data:     LABS    CMP:   Recent Labs   Lab 05/07/21  0621 05/05/21  0707 05/03/21  0640 05/02/21  0715 05/01/21  0654    137 132* 132* 132*   POTASSIUM 4.3 4.5 5.5* 5.0 4.3   CHLORIDE 102 103 100 97 98   CO2 23 25 23 26 28   ANIONGAP 10 8 9 9 7   * 118* 129* 122* 229*   BUN 66* 53* 85* 66* 33*   CR 3.90* 3.62* 4.55* 4.12* 2.72*   GFRESTIMATED 12* 13* 10* 11* 18*   GFRESTBLACK 14* 15* 11* 13* 21*   CHELSEY 8.1* 8.8 7.8* 8.3* 9.0   PHOS  --   --  5.9*  --   --    PROTTOTAL  --   --   --   --  7.2     CBC:   Recent Labs   Lab 05/02/21  0715   WBC 7.5   RBC 3.47*   HGB 10.6*   HCT 33.8*   MCV 97   MCH 30.5   MCHC 31.4*   RDW 13.9          INR: No lab results found in last 7 days.    Glucose:   Recent Labs   Lab 05/07/21  0621 05/05/21  0707 05/03/21  0640 05/02/21  0715 05/01/21  0654 04/30/21  1547   * 118* 129* 122* 229*  --    BGM  --   --   --   --   --  110*       Blood Gas: No lab results found in last 7 days.    Culture Data   Recent Labs   Lab 05/01/21  1102   CULT Culture negative after 4 days  Quantity not sufficient  Called to Maxim Norman at 1236 5/1/21.    mlb         Virology Data:   Lab Results   Component Value Date    FLUAH1 Not Detected 01/24/2021    FLUAH3 Not Detected 01/24/2021    DH5772 Not Detected 01/24/2021    IFLUB Not Detected 01/24/2021    RSVA Not Detected 01/24/2021    RSVB Not Detected 01/24/2021    PIV1 Not Detected 01/24/2021    PIV2 Not Detected 01/24/2021    PIV3 Not Detected 01/24/2021    HMPV Not Detected 01/24/2021    HRVS Negative 01/24/2021    ADVBE Negative 01/24/2021    ADVC Negative 01/24/2021    ADVC Negative 12/23/2020    ADVC Negative 10/07/2019       Historical CMV results (last 3 of prior testing):  Lab Results   Component Value Date    CMVQNT CMV DNA Not Detected 02/25/2021     CMVQNT <137 (A) 01/25/2021    CMVQNT 238 (A) 01/24/2021     Lab Results   Component Value Date    CMVLOG Not Calculated 02/25/2021    CMVLOG <2.1 01/25/2021    CMVLOG 2.4 (H) 01/24/2021       Urine Studies    Recent Labs   Lab Test 01/24/21  1729 10/21/19  2240   URINEPH 5.0 5.0   NITRITE Negative Negative   LEUKEST Moderate* Large*   WBCU 34* 115*       Most Recent [place] Pulmonary Function Testing (FVC/FEV1 only)  FVC-Pre   Date Value Ref Range Status   04/08/2021 1.41 L    12/09/2020 1.12 L    09/09/2020 1.56 L    03/09/2020 1.57 L      FVC-%Pred-Pre   Date Value Ref Range Status   04/08/2021 43 %    12/09/2020 34 %    09/09/2020 47 %    03/09/2020 48 %      FEV1-Pre   Date Value Ref Range Status   04/08/2021 1.09 L    12/09/2020 0.98 L    09/09/2020 1.10 L    03/09/2020 0.96 L      FEV1-%Pred-Pre   Date Value Ref Range Status   04/08/2021 42 %    12/09/2020 37 %    09/09/2020 42 %    03/09/2020 37 %        IMAGING    No results found for this or any previous visit (from the past 48 hour(s)).

## 2021-05-07 NOTE — PLAN OF CARE
Pt s/p bilateral lung tx 2018. Continues to have one CT with minimal output. L arm fistula. To have dialysis tomorrow morning and then go to IR to have ampho B beads put in the thoracic cavity and the CT removed. May discharge afterwards.Up ad diego A&O x 4 and pleasant.

## 2021-05-07 NOTE — PROGRESS NOTES
HEMODIALYSIS TREATMENT NOTE    Date: 5/7/2021  Time: 12:16 PM    Data:  Pre Wt: 50.7 kg (111 lb 12.4 oz)   Desired Wt: 47.2 kg   Post Wt: 47 kg (103 lb 10.6 oz)  Weight change: 3.68 kg  Ultrafiltration - Post Run Net Total Removed (mL): 3500 mL  Vascular Access Status: Fistula  patent  Dialyzer Rinse: Streaked, Light  Total Blood Volume Processed: 81.48 L   Total Dialysis (Treatment) Time: 3.5   Dialysate Bath: K 3, Ca 3  Heparin: None    Lab:   No    Interventions:  zemplar given  15g with lido used to cannulate    Assessment:  Pt vitally stable through out. Pt denies pain/sob. CT to -20. Treatment unremarkable. Handoff given to floor rn.   Plan:    Per renal

## 2021-05-07 NOTE — PRE-PROCEDURE
GENERAL PRE-PROCEDURE:   Procedure:  Left chest tube exchange for a sheath for placement of antifungal beeds by thoracic surgery  Date/Time:  5/7/2021 2:40 PM    Written consent obtained?: Yes    Risks and benefits: Risks, benefits and alternatives were discussed    Consent given by:  Patient  Patient states understanding of procedure being performed: Yes    Patient's understanding of procedure matches consent: Yes    Procedure consent matches procedure scheduled: Yes    Expected level of sedation:  Moderate  Appropriately NPO:  Yes  ASA Class:  Class 2- mild systemic disease, no acute problems, no functional limitations  Mallampati  :  Grade 2- soft palate, base of uvula, tonsillar pillars, and portion of posterior pharyngeal wall visible  Lungs:  Lungs clear with good breath sounds bilaterally  Heart:  Normal heart sounds and rate  History & Physical reviewed:  History and physical reviewed and no updates needed  Statement of review:  I have reviewed the lab findings, diagnostic data, medications, and the plan for sedation

## 2021-05-07 NOTE — PROGRESS NOTES
"  Nephrology Progress Note  05/07/2021         Assessment & Recommendations:   Kecia Blue is a 60 year old female with PMH of ILD s/p b/l lung transplant (2018) c/b R mainstem bronchial stenosis requiring dilations, Aspergillus empyema, and CMV viremia, HTN, hx postop DVT not currently on anticoagulation, hx pAfib, SVT, ESRD, admitted for scheduled left sided chest tube placement due to persistent loculated pleural effusion, having Ampho B beads placed in pleural space 5/7.      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 Glasgow. Access: LUE AVG. Run time: 3.5 hrs. EDW: 51 kg   - Dialysis per MWF schedule    Volume/BP: fairly well controlled  - EDW 51 kg with usual OP UF 2 to 2.6 kg  - Will challenged EDW given pulm edema on imaging  - Re-establish EDW at 47.5-48 kg    BMD: Ca 8.8, no alb or phos 5.9; on paricalcitol 2 mcg per HD  - Continue paricalcitol via HD    Anemia: hgb > 10 g/dL, no indication for TASH    Recurrent L pleural effusion with fungal empyema  -  s/p L chest tube 4/29, and bronch 4/30  - On long term posaconazole  - Having Ampho B beads placed in pleural space today 5/7 and likely chest tube to be pulled    Recommendations were communicated to primary team via this note     Adriana Jarvis, PA -C  [phone]    Interval History :   Seen on dialysis, challenging EDW as able. Pt is having Ampho B beads placed in pleural space today, likely chest tube to be pulled as well. Denies n/v, CP, SOB, chills    Review of Systems:   4 point ROS neg other than as noted above.    Physical Exam:   I/O last 3 completed shifts:  In: 1196 [P.O.:1196]  Out: 175 [Urine:175]   BP (!) 145/86   Pulse 80   Temp 98  F (36.7  C) (Oral)   Resp (!) 32   Ht 1.6 m (5' 3\")   Wt 50.7 kg (111 lb 12.4 oz)   LMP 06/07/2014 (Exact Date)   SpO2 100%   BMI 19.80 kg/m       GENERAL APPEARANCE: alert, NAD  EYES:  no scleral icterus, " pupils equal  HENT: mouth without ulcers or lesions  PULM: diminished, L chest tube  CV: regular rhythm, normal rate     -edema no peripheral  GI: soft   INTEGUMENT: no cyanosis   NEURO:  A/o3  Access LUE AVG    Labs:   All labs reviewed by me  Electrolytes/Renal -   Recent Labs   Lab Test 05/07/21  0621 05/05/21  0707 05/03/21  0640 04/30/21  0606 04/30/21  0606 04/29/21  1833 04/08/21  0722 03/29/21 03/04/21  0611    137 132*   < > 136  --  138 141 133   POTASSIUM 4.3 4.5 5.5*   < > 4.1  --  3.7 4.3 4.5   CHLORIDE 102 103 100   < > 101  --  101 101 98   CO2 23 25 23   < > 27  --  32 25 23   BUN 66* 53* 85*   < > 45*  --  22 49.5  11.00 83*   CR 3.90* 3.62* 4.55*   < > 3.40*  --  2.49* 4.50 3.98*   * 118* 129*   < > 130*  --  138* 122* 127*   CHELSEY 8.1* 8.8 7.8*   < > 8.6  --  7.9* 7.9 7.9*   MAG  --   --   --   --  1.7 1.6 1.5* 1.4 1.6   PHOS  --   --  5.9*  --   --   --   --  5.8 6.1*    < > = values in this interval not displayed.       CBC -   Recent Labs   Lab Test 05/02/21  0715 04/30/21  0606 04/29/21  1020   WBC 7.5 6.8 6.3   HGB 10.6* 10.9* 11.1*    216 218       LFTs -   Recent Labs   Lab Test 05/01/21  0654 03/29/21 03/01/21  0637 02/27/21  0853   ALKPHOS  --  288 339* 326*   BILITOTAL  --  0.9 0.7 0.4   ALT  --  36 49 44   AST  --  19 17 16   PROTTOTAL 7.2  --  6.8 6.6*   ALBUMIN  --  3.5 2.8* 2.5*       Iron Panel -   Recent Labs   Lab Test 02/03/21  0415 12/13/18  1033 08/01/18  0921   IRON 51 16* 93   IRONSAT 36 7* 37   YOLA  --  302* 571*         Imaging:  Reviewed    Current Medications:    acetylcysteine  2 mL Nebulization BID     albuterol  2.5 mg Nebulization BID     budesonide  0.5 mg Nebulization BID     fluticasone  1 spray Both Nostrils Daily     gelatin absorbable  1 each Topical During Hemodialysis (from stock)     magnesium chloride  1,605 mg Oral Once per day on Sun Tue Thu Sat     metoprolol tartrate  25 mg Oral or Feeding Tube BID     multivitamin RENAL  1 tablet Oral  Daily with supper     pantoprazole  40 mg Oral QAM AC     paricalcitol  2 mcg Intravenous Once in dialysis     posaconazole  300 mg Oral Daily     predniSONE  2.5 mg Oral QPM     predniSONE  5 mg Oral QAM     sodium chloride (PF)  3 mL Intracatheter Q8H     sulfamethoxazole-trimethoprim  1 tablet Oral Q Mon Wed Fri AM     tacrolimus  0.5 mg Oral QAM     tacrolimus  1 mg Oral QPM       - MEDICATION INSTRUCTIONS -       - MEDICATION INSTRUCTIONS -       Adriana Jarvis PA-C

## 2021-05-07 NOTE — PROGRESS NOTES
Patient Name: Kecia Blue  Medical Record Number: [id number]  Today's Date: 5/7/2021    Procedure: Left chest tube sheath for antibiotic beads by thoracic surgery.  Proceduralist: MD Linda., MD Hilario.,MD Yon.    Procedure Start: 1458  Procedure end: 1650  Sedation medications administered: Fentanyl:200 mcg  Versed: 4mg    Report given to: TORIBIO, RN  : n/a    Other Notes: Pt arrived to IR room 1 from . Consent reviewed. Pt denies any questions or concerns regarding procedure. Pt positioned sidelying and monitored per protocol. Pt tolerated procedure without any noted complications. Pt transferred back to .    Elke Fagan RN

## 2021-05-07 NOTE — PROGRESS NOTES
Care Management Follow Up    Length of Stay (days): 8    Expected Discharge Date: 05/08/21     Concerns to be Addressed:       Patient plan of care discussed at interdisciplinary rounds: Yes    Anticipated Discharge Disposition:  Home with family     Anticipated Discharge Services:    Resume    [facility]  [address] [place] [address]    Phone  [phone]  Fax  [phone]    Days: MWF--resume Monday 5/10/21    Unit called/notified: LOUISE Christina on 5/7/21 @ 8994 by Nancy LOAIZA    Anticipated Discharge DME:      Patient/family educated on Medicare website which has current facility and service quality ratings:    Education Provided on the Discharge Plan:    Patient/Family in Agreement with the Plan:      Referrals Placed by CM/SW:    Private pay costs discussed: Not applicable    Additional Information:  Pt having Ampho B beads placed this morning with HD run, then may discharge to home and follow OP.      Nancy Oconnell RN

## 2021-05-07 NOTE — PLAN OF CARE
Pt went for scheduled dialysis run this morning-3.5L removed. L arm fistula drsg C/D/I.  Continues to be npo upon return to unit for IR procedure this afternoon to have amphoB beads placed and then chest tube removed. Pt hoping to discharge to home later this evening-family available to provide transport if cleared for discharge. No new rxs likely needed.  Pt up independently in room; denies pain. Awaiting IR procedure.

## 2021-05-10 NOTE — DISCHARGE SUMMARY
[facility]  Hospitalist Discharge Summary      Date of Admission:  4/29/2021  Date of Discharge:  5/7/2021  6:17 PM  Discharging Provider: Morgan Downey MD  Discharge Team: Hospitalist Service, Gold 9    Discharge Diagnoses     Please see Hospital Course     Follow-ups Needed After Discharge   Follow-up Appointments     Adult [facility] Follow-up and recommended labs and tests      Follow up with primary care provider, Rosy Vu, within 7   days to evaluate medication change.  No follow up labs or test are needed.    Please follow up with your transplant team, you will get a call for follow   up     Please continue to follow up with your Nephrologist and HD      Appointments on [place] and/or [facility] (with [facility] or [facility]   provider or service). Call [phone] if you haven't heard regarding   these appointments within 7 days of discharge.         Follow Up and recommended labs and tests      Resume    [facility]  [address] [place] [address]    Phone  [phone]  Fax  [phone]    Days: MWF--resume Monday 5/10/21    Unit called/notified on 5/7/21 @ 1334 by Nancy ROONEY  CC             Unresulted Labs Ordered in the Past 30 Days of this Admission     Date and Time Order Name Status Description    4/30/2021 1500 Fungus Culture, non-blood Preliminary     4/30/2021 1500 Cytology non gyn In process       These results will be followed up by Ordering Physician     Discharge Disposition   Discharged to home  Condition at discharge: Good    Hospital Course     Kecia Blue is a 60 year old female with past medical history significant for ILD s/p bilateral lung transplant (3/1/2018) 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 Pleural fluid cx obtained, fungal elements seen.   - s/p bronchoscopy on 4/30 w/ Dr. Norris for possible dilation and stent/tissue debulking. (plan on repeat bronchoscopy in about 2 months to assess whether further tissue debulking is beneficial.)  - Initially was planned to do pleurodesis however decision was made to place amphotericin beads when fungal elements were identified from pleural fluid.   - S/p amph beads placement on day of discharge, removal of chest tube  - Transplant Pulmonary followed and managed IS  - continue Prednisone 5mg/2.5mg, [place] (goal 8-10)   - Continue PTA Posaconazole 300mg daily   - Continue PTA Bactrim on MWF   - Continue Albuterol nebs BID  - Continue Pulmicort nebs BID   - Cotinue Flonase daily       # ESRD on HD MWF - Follows w/ [facility] under Dr. Glasgow via AVF/G.  On HD since 11/2019.   - Nephrology consult to continue HD      # Hx PAF, SVT   Per chart review, hx of AF w/ RVR during periods of acute illness and acute respiratory failure.  Follows w/ [facility] EP, last seen by Dr. Mcmahan on 4/21.  Outpt Ziopatch study neg for AF, though did have frequent runs of SVT but was asymptomatic.  Currently on Metoprolol 25mg BID for rate control.  [id number] 1.  Per Cardiology, no indication for anticoagulation at this time.   - Continue PTA Metoprolol w/ hold parameters   - Follow up with [facility] EP as needed     # Hypomagnesemia -   Currently on Slow-Mag 71.5-119mg on non-HD days.   - Continue PTA Slow-mag supplementation   - Ensure stable lytes  "given hx SVT, PAF      # Anemia - Likely 2/2 ESRD.      Consultations This Hospital Stay   PULMONARY CF/TRANSPLANT ADULT IP CONSULT  NEPHROLOGY ESRD ADULT IP CONSULT  THORACIC SURGERY ADULT IP CONSULT  VASCULAR ACCESS CARE ADULT IP CONSULT  INTERVENTIONAL RADIOLOGY ADULT/PEDS IP CONSULT    Code Status   Full Code    Time Spent on this Encounter   I, Morgan Downey MD, personally saw the patient today and spent greater than 30 minutes discharging this patient.       Morgan Downey MD  [facility] [address] [address]  Phone: [phone]  ______________________________________________________________________    Physical Exam   /86 (BP Location: Right arm)   Pulse 88   Temp 97.7  F (36.5  C) (Oral)   Resp 12   Ht 1.6 m (5' 3\")   Wt 50.7 kg (111 lb 12.4 oz)   LMP 06/07/2014 (Exact Date)   SpO2 100%   BMI 19.80 kg/m    Constitutional: Awake, alert, cooperative, no apparent distress  Respiratory: Chest tube present, comfortable breathing  Cardiovascular: Regular rate and rhythm  GI: Normal bowel sounds, soft, non-distended, non-tender  Skin/Integumen: No rashes, no cyanosis       Primary Care Physician   Rosy Vu    Discharge Orders      Reason for your hospital stay    You were hospitalized for management of empyema, you had a chest tube and received amphoterecin beads implanted. Chest tube removed and discharged to follow up     Adult [facility] Follow-up and recommended labs and tests    Follow up with primary care provider, Rosy Vu, within 7 days to evaluate medication change.  No follow up labs or test are needed.    Please follow up with your transplant team, you will get a call for follow up     Please continue to follow up with your Nephrologist and HD      Appointments on [place] and/or [facility] (with [facility] or [facility] provider or service). Call [phone] if you haven't heard regarding these appointments within 7 days " of discharge.     Activity    Your activity upon discharge: activity as tolerated     Follow Up and recommended labs and tests    Resume    [facility]  [address] [place] [address]    Phone  [phone]  Fax  [phone]    Days: MWF--resume Monday 5/10/21    Unit called/notified on 5/7/21 @ 0199 by Nancy ROONEY  CC     Full Code     Diet    Follow this diet upon discharge: Orders Placed This Encounter      NPO per Anesthesia Guidelines for Procedure/Surgery Except for: Meds       Significant Results and Procedures   Most Recent 3 CBC's:  Recent Labs   Lab Test 05/02/21  0715 04/30/21  0606 04/29/21  1020   WBC 7.5 6.8 6.3   HGB 10.6* 10.9* 11.1*   MCV 97 101* 100    216 218     Most Recent 3 BMP's:  Recent Labs   Lab Test 05/07/21  0621 05/05/21  0707 05/03/21  0640    137 132*   POTASSIUM 4.3 4.5 5.5*   CHLORIDE 102 103 100   CO2 23 25 23   BUN 66* 53* 85*   CR 3.90* 3.62* 4.55*   ANIONGAP 10 8 9   CHELSEY 8.1* 8.8 7.8*   * 118* 129*     Most Recent 2 LFT's:  Recent Labs   Lab Test 03/29/21 03/01/21  0637   AST 19 17   ALT 36 49   ALKPHOS 288 339*   BILITOTAL 0.9 0.7   ,   Results for orders placed or performed during the hospital encounter of 04/29/21   CT Chest Tube with Cath Placement    Narrative    Examination 4/29/2021:   Left chest tube placement under CT guidance.    HISTORY:    60-year-old female with left pleural collection. Drainage  of left pleural collection requested.    COMPARISON: CT chest 4/8/2021.    Staff: BHAVANI Blevins MD.    JT, JAVIER BLEVINS MD, attest that I was present for all critical  portions of the procedure and was immediately available to provide  guidance and assistance during the remainder of the procedure.    Fellow: Meet Rich M.D.    Resident: Chris Wilder MD.    MEDICATIONS: Continuous monitoring of intravenous conscious sedation  was performed by the Radiology nurse. Vital signs throughout the  procedure remained stable.       Medications:  1. Fentanyl 100 mcg IV  2. Versed 2 mg IV  3. 1% lidocaine 10 ml local anesthesia    Face-to-face sedation time: 20 minutes    Description of procedure: Verbal and written consent were obtained  prior to procedure. The patient was fully aware of the benefits and  risks of the procedure. All questions were answered to the patient's  satisfaction.    The patient was prepped and draped in the usual sterile fashion, in  right lateral decubitus position. A preprocedural CT scan was  performed which demonstrated left pleural collection. Left back was  prepped and draped in usual sterile fashion. Under CT fluoroscopic  guidance, approximately 10 cc of 1% lidocaine was injected  subcutaneously for skin anesthesia, including the planned tract to the  level of the pleura. A small skin nick was made with a #11 blade  scalpel. Next, a 5 Georgian Client24 catheter was advanced into the pleural  space under ultrasound guidance. Once the needle catheter system was  in the pleural space, the needle was fixed and the catheter was  advanced. The needle was removed and exchanged for a 0.035 stiff  Amplatz wire which was coiled in the left pleural collection. The  tract was dilated with 12 and 14 Georgian dilators. Subsequently, a 14  Georgian nonlocking pigtail catheter was advanced into the left pleural  collection. About 30 cc of thick pus aspirated and sent to the  laboratory for analysis. The chest tube was placed to water seal.     The patient tolerated the procedure well and was without complaint.  The patient was transferred in stable condition.      Impression    IMPRESSION:   Successful placement of a 14 Georgian nonlocking pigtail catheter chest  tube in left pleural collection. About 30 cc of thick pus aspirated  and sent to the laboratory for analysis.    PLAN:   Chest tube to waterseal drainage and low wall suction..    I have personally reviewed the examination and initial interpretation  and I agree with the  findings.    JAVIER BLEVINS MD   XR Chest 2 Views    Narrative    EXAM: XR CHEST 2 VW  4/30/2021 11:06 AM     HISTORY:  s/p chest tube placement, h/o BSLT       COMPARISON:  Chest x-ray 4/8/2021. CT chest 4/29/2021    FINDINGS:   PA and lateral radiographs of the chest. Postsurgical changes of  bilateral lung transplant with intact median sternotomy wires. New  posterior approach left pigtail drain. Streaky bibasilar studies. No  significant pleural effusion. No pneumothorax. Accessory azygous lobe.  Surgical clips project over the right axilla. Multiple air distended  loops of bowel in the visualized upper abdomen.      Impression    IMPRESSION:   1. New left basilar chest tube.  2. Streaky perihilar and bibasilar opacities, likely atelectasis and  edema.    I have personally reviewed the examination and initial interpretation  and I agree with the findings.    ADAM GONZALEZ MD   XR Chest 2 Views    Narrative    Exam: XR CHEST 2 VW, 5/1/2021 11:49 AM    Indication: follow up for effusion and chest tube    Comparison: 4/30/2021    Findings:   Left chest tube is still in place. No pneumothorax. Near complete  resolution of the loculated left pleural effusion. Heart and pulmonary  vasculature within normal limits. Perihilar and lower lobe hazy  opacity suggests atelectasis and/or edema.      Impression    Impression: Near complete resolution of the loculated left pleural  effusion.    ANNE MANZANO MD   XR Chest 2 Views    Narrative    Exam: XR CHEST 2 VW, 5/2/2021 10:43 AM    Indication: follow up for effusion and chest tube    Comparison: Chest radiographs 5/1/2021    Findings:   PA and lateral views of the chest.    Left pigtail chest tube in stable position projecting over the lower  lobe. Surgical clips project over the right upper lung field. Midline  sternotomy wires are intact. Surgical clips project over the  mediastinum.    The cardiac mediastinal silhouette is within normal limits. Low lung  volumes.  Perihilar and bibasilar opacities, likely atelectasis. No  large pleural effusion. No pneumothorax. Gaseous distention of the  colon in the upper abdomen. Chronic compression deformity of the T5  vertebral body, unchanged.      Impression    Impression:   1. Stable left chest tube. The loculated left pleural effusion appears  resolved. No pneumothorax.  2. Chronic compression fracture of T5.    MATT BATEMAN MD   XR Chest 2 Views    Narrative    Exam: XR CHEST 2 VW, 5/3/2021 9:49 AM    Indication: follow up for effusion and chest tube    Comparison: 5/2/2021    Findings:   Right chest tube is unchanged in position. No significant fluid seen  around the chest tube. Borderline cardiomegaly. Pulmonary vasculature  not engorged. Perihilar and lower lobe hazy opacity suggesting  atelectasis is similar.      Impression    Impression:   1. Left chest tube remains in place. Loculated pleural effusion  appears resolved. No pneumothorax.  2. Continued perihilar and lower lobe opacity suggesting atelectasis.    ANNE MANZANO MD   XR Chest 2 Views    Narrative    EXAM: XR CHEST 2 VW  5/4/2021 8:48 AM     HISTORY:  s/p BLTx, recurrent left pleural effusion s/p chest tube  placement       COMPARISON:  Chest x-ray 5/3/2021    FINDINGS:   PA and lateral radiographs of the chest. Postsurgical changes of  bilateral lung transplant with intact median sternotomy wires. Stable  position of left sided chest tube. Trachea is midline. Cardiac  silhouette is stable in size. Grossly unchanged streaky perihilar  opacities. No significant pleural effusion. No pneumothorax. No new  airspace opacities.      Impression    IMPRESSION:   1. Grossly unchanged streaky perihilar opacities, likely  atelectasis/edema.  2. Left-sided chest tube in place without appreciable pleural effusion  or pneumothorax.    I have personally reviewed the examination and initial interpretation  and I agree with the findings.    ADAM GONZALEZ MD   CT Chest w/o  Contrast    Narrative    EXAMINATION: Chest CT  5/4/2021 10:45 AM    CLINICAL HISTORY: S/p lung transplant, persistent aspergillus left  empyema, pleural effusion, s/p left chest pig tail catheter    COMPARISON: CT chest 4/29/2021 and 4/8/2021.    TECHNIQUE: CT imaging obtained through the chest without contrast.  Axial, coronal, and sagittal reconstructions and axial MIP reformatted  images are reviewed.     CONTRAST: None    FINDINGS:  Lungs: Postsurgical changes of bilateral lung transplant. No  significant change in the mild right bronchial anastomotic narrowing.  The airway is otherwise patent. Mild right-sided bronchial wall  thickening. Accessory azygos lobe fissure on the right. Decreased size  of the peripherally calcified loculated gas and fluid collection in  the medial left lower lobe with left chest tube coiled in the  collection. Dense retained amphotericin bead within the dependent  portion of the collection. Bilateral pleural thickening. No  significant pleural effusion. No pneumothorax. Patchy groundglass  opacities and interlobular septal thickening throughout the bilateral  lower lobes, right greater than left, which have slightly decreased  compared to CT of the chest on 4/8/2021. No new airspace  consolidation.    Mediastinum: The thyroid is unremarkable. Cardiac size is enlarged. No  pericardial effusion. Normal caliber of the aorta and main pulmonary  artery. No enlarged thoracic lymph nodes. Esophagus is normal in  caliber.    Bones and soft tissues: No suspicious bone findings. Stable anterior  wedge compression deformity of T5. Intact median sternotomy wires.    Upper Abdomen: Limited evaluation of the upper abdomen. Cholelithiasis  without pericholecystic inflammatory changes.      Impression    IMPRESSION:   1. Decreased size of the peripherally calcified chronic empyema within  the medial left lower lobe with left sided chest tube in place.  Retained amphotericin bead is within the  collection.  2. Cardiomegaly with basilar predominance groundglass opacities and  interlobular septal thickening, compatible with pulmonary edema.  3. Postsurgical changes of bilateral lung transplant with unchanged  mild right bronchial anastomotic narrowing.    I have personally reviewed the examination and initial interpretation  and I agree with the findings.    ADAM GONZALEZ MD   CT Empyema Abscess Drain w Cath Rmvd    Narrative    PROCEDURES 5/7/2021:  Right-sided chest tube exchange for large bore sheath for  administration of antibiotic beads by thoracic surgery    CLINICAL HISTORY: Patient with history of Aspergillus empyema, status  post lung transplantation, with continued Aspergillus infection. The  surgical options have been deemed quite morbid. A very extensive  multidisciplinary discussion has been held led by thoracic surgery in  terms of best treatment options, and plan is for administration of  antibiotic beads locally. Interventional radiology has been asked to  provide access to the left lower lung cavitary lesion.    Comparisons: CT dated 4/29/2021    STAFF: BHAVANI Mckeon M.D. (only providing access to the cavitary lesion in  the left lung).    Fellow: Meet Rich M.D.  (only providing access to the cavitary  lesion in the left lung).    Medications: The patient was placed on continuous monitoring.  Intravenous sedation was administered. Patient received 200 mcg of  fentanyl and 4 mg of Versed. The patient remained stable throughout  the procedure.    Sedation time: 112 minutes    Fluoroscopy time: 2.6 minutes.    PROCEDURE: The patient understood the limitations, alternatives, and  risks of the procedure and requested the procedure be performed. Both  written and verbal consent were obtained. The consent regarding  administration of antibiotic disease was deferred to thoracic surgery  team. We discussed the risks and benefits of placement of large bore  tube into the cavitary lesion.    Patient  was placed right lateral decubitus on procedure table. The  indwelling 14 Tristanian chest tube was cut and a Bentson wire was  advanced. The tube was exchanged for a 14 Tristanian peel-away sheath.  Through the sheath antibiotic beads were administered by thoracic  surgeon Dr. Marvin. After inserting few beads that sheath got clogged.  Back end of the Bentson wire was used to regain access to the left  pleural collection. Thereafter the sheath was pulled out and exchanged  for a 16 Tristanian peel-away sheath.    At this time additional antibiotic beads were administered by Dr. Marvin. We did provide assistance from a technical standpoint. Position  of the antibiotic beads within the cavity were confirmed with CT scan.    After completion of instillation of antibiotic beads, interventional  radiology took over as the  again. Note was made of  lack of a significant pneumothorax despite presence of a 14 Tristanian  tube into the cavity. This indicated loculated nature of this cavity  and lack of a significant communication to the pleural space. The tube  was removed and airtight sterile dressing was applied.    Completion scan demonstrated presence of antibiotic beads within the  cavitary lesion with small amount of air as planned. No large  pneumothorax or hemorrhage was noted. Blood loss was minimal. No  immediate complications.      Impression    IMPRESSION:  Successful access and dilation into left pulmonary cavitary lesion for  instillation of antibiotic beads by Dr. Yon Orellana from thoracic  surgery. Technically successful instillation of antibiotic beads  without immediate complications.   IR Fluoro 0-1 Hour    Narrative    PROCEDURES 5/7/2021:  Right-sided chest tube exchange for large bore sheath for  administration of antibiotic beads by thoracic surgery    CLINICAL HISTORY: Patient with history of Aspergillus empyema, status  post lung transplantation, with continued Aspergillus infection. The  surgical  options have been deemed quite morbid. A very extensive  multidisciplinary discussion has been held led by thoracic surgery in  terms of best treatment options, and plan is for administration of  antibiotic beads locally. Interventional radiology has been asked to  provide access to the left lower lung cavitary lesion.    Comparisons: CT dated 4/29/2021    STAFF: BHAVANI Mckeon M.D. (only providing access to the cavitary lesion in  the left lung).    Fellow: Meet Rich M.D.  (only providing access to the cavitary  lesion in the left lung).    Medications: The patient was placed on continuous monitoring.  Intravenous sedation was administered. Patient received 200 mcg of  fentanyl and 4 mg of Versed. The patient remained stable throughout  the procedure.    Sedation time: 112 minutes    Fluoroscopy time: 2.6 minutes.    PROCEDURE: The patient understood the limitations, alternatives, and  risks of the procedure and requested the procedure be performed. Both  written and verbal consent were obtained. The consent regarding  administration of antibiotic disease was deferred to thoracic surgery  team. We discussed the risks and benefits of placement of large bore  tube into the cavitary lesion.    Patient was placed right lateral decubitus on procedure table. The  indwelling 14 Monegasque chest tube was cut and a Bentson wire was  advanced. The tube was exchanged for a 14 Monegasque peel-away sheath.  Through the sheath antibiotic beads were administered by thoracic  surgeon Dr. Marvin. After inserting few beads that sheath got clogged.  Back end of the Bentson wire was used to regain access to the left  pleural collection. Thereafter the sheath was pulled out and exchanged  for a 16 Monegasque peel-away sheath.    At this time additional antibiotic beads were administered by Dr. Marvin. We did provide assistance from a technical standpoint. Position  of the antibiotic beads within the cavity were confirmed with CT scan.    After  completion of instillation of antibiotic beads, interventional  radiology took over as the  again. Note was made of  lack of a significant pneumothorax despite presence of a 14 French  tube into the cavity. This indicated loculated nature of this cavity  and lack of a significant communication to the pleural space. The tube  was removed and airtight sterile dressing was applied.    Completion scan demonstrated presence of antibiotic beads within the  cavitary lesion with small amount of air as planned. No large  pneumothorax or hemorrhage was noted. Blood loss was minimal. No  immediate complications.      Impression    IMPRESSION:  Successful access and dilation into left pulmonary cavitary lesion for  instillation of antibiotic beads by Dr. Yon Orellana from thoracic  surgery. Technically successful instillation of antibiotic beads  without immediate complications.     *Note: Due to a large number of results and/or encounters for the requested time period, some results have not been displayed. A complete set of results can be found in Results Review.       Discharge Medications   Discharge Medication List as of 5/7/2021  5:31 PM      CONTINUE these medications which have NOT CHANGED    Details   acetaminophen (TYLENOL) 325 MG tablet Take 1 tablet (325 mg) by mouth every 4 hours as needed for mild pain or fever, Disp-60 tablet, No Print Out      albuterol (PROVENTIL) (2.5 MG/3ML) 0.083% neb solution Take 1 vial (2.5 mg) by nebulization 2 times daily, Disp-360 mL, R-0, Historical      budesonide (PULMICORT) 0.5 MG/2ML neb solution Take 2 mLs (0.5 mg) by nebulization 2 times daily, Disp-2 mL, R-3, Historical      fluticasone (FLONASE) 50 MCG/ACT nasal spray Spray 1 spray into both nostrils daily, Disp-15.8 mL, R-0, E-Prescribe      Magnesium Cl-Calcium Carbonate (SLOW-MAG) 71.5-119 MG TBEC Take 3 tablets by mouth four times a week Take on non dialysis days T/Th/Sa/Su, Disp-90 tablet, R-3, Historical       metoprolol tartrate (LOPRESSOR) 25 MG tablet 1 tablet (25 mg) by Oral or Feeding Tube route 2 times daily, Disp-60 tablet, R-0, Historical      multivitamin RENAL (RENAVITE RX/NEPHROVITE) 1 MG tablet Take 1 tablet by mouth daily, Disp-30 tablet, R-11, E-Prescribe      pantoprazole (PROTONIX) 40 MG EC tablet Take 1 tablet (40 mg) by mouth every morning (before breakfast), Disp-30 tablet, R-0, E-Prescribe      posaconazole (NOXAFIL) 100 MG EC tablet Take 3 tablets (300 mg) by mouth daily, Disp-270 tablet, R-0, E-Prescribe      !! predniSONE (DELTASONE) 2.5 MG tablet Take 1 tablet (2.5 mg) by mouth every evening, Disp-30 tablet, R-11, E-Prescribe      !! predniSONE (DELTASONE) 5 MG tablet Take 1 tablet (5 mg) by mouth every morning, Disp-30 tablet, R-11, E-Prescribe      sulfamethoxazole-trimethoprim (BACTRIM) 400-80 MG tablet Take 1 tablet by mouth Every Mon, Wed, Fri Morning, Disp-13 tablet, R-11, E-Prescribe      tacrolimus (GENERIC EQUIVALENT) 0.5 MG capsule Take 1 capsule (0.5 mg) by mouth every morning Total dose: 0.5 mg in the AM and 1 mg in the PM, Disp-30 capsule, R-11, E-Prescribe      tacrolimus (GENERIC EQUIVALENT) 1 MG capsule Take 1 capsule (1 mg) by mouth every evening Total dose: 0.5 mg in the AM and 1 mg in the PM, Disp-30 capsule, R-11, E-Prescribe      !! blood glucose (NO BRAND SPECIFIED) test strip Use to test blood sugar 4 times daily or as directed., Disp-120 strip, R-0, Local PrintPatient needs the following supplies: Tesfaye Microlet Lancets  B-D 4 mm barrington pen needles  Novolog Flexpen  Alcohol Wipes  Sharps Container      !! blood glucose (NO BRAND SPECIFIED) test strip Use to test blood sugar 4 times daily or as directed., Disp-120 strip, R-0, Local PrintPatient needs the following supplies: Tesfaye Microlet Lancets  B-D 4 mm barrington pen needles  Novolog Flexpen  Alcohol Wipes  Sharps Container      ondansetron (ZOFRAN-ODT) 4 MG ODT tab Take 1 tablet (4 mg) by mouth every 6 hours as needed for  nausea or vomiting, Disp-12 tablet, R-0, E-Prescribe       !! - Potential duplicate medications found. Please discuss with provider.        Allergies   No Known Allergies

## 2021-05-10 NOTE — PROGRESS NOTES
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      ASSESSMENT      Discharge Assessment  Patient reports symptoms are: Improved  Does the patient have all of their medications?: Yes  Does patient know what their new medications are for?: Yes  Does patient have a follow-up appointment scheduled?: Yes  Does patient have any other questions or concerns?: No    Post-op  Did the patient have surgery or a procedure: No  Fever: No  Chills: No  Eating & Drinking: eating and drinking without complaints/concerns  PO Intake: regular diet  Bowel Function: normal  Urinary Status: voiding without complaint/concerns        PLAN                                                      Outpatient Plan:  Follow-up Appointments     Adult [facility] Follow-up and recommended labs and tests      Follow up with primary care provider, Rosy Vu, within 7   days to evaluate medication change.  No follow up labs or test are needed.     Please follow up with your transplant team, you will get a call for follow   up      Please continue to follow up with your Nephrologist and HD        Appointments on [facility] and/or [facility] (with [facility] or [facility]   provider or service). Call [phone] if you haven't heard regarding   these appointments within 7 days of discharge.         Follow Up and recommended labs and tests      Resume     [facility]  [address] [place] [address]     Phone  [phone]  Fax  [phone]     Days: MWF--resume Monday 5/10/21     Unit called/notified on 5/7/21 @ 7064 by Nancy LOAIZA               Future Appointments   Date Time Provider Department Center   5/25/2021 10:15 AM UC LAB UCLAB [facility]   5/25/2021 10:30 AM [id number] UCCXR [facility]   5/25/2021 11:00 AM UC PFL C UCPFT [facility]   5/25/2021 11:30 AM Ame Chow PA-C CLS [facility]   6/22/2021 11:00 AM Feng Denise MD [facility]           Eulalia Becker, [facility]

## 2021-05-14 ENCOUNTER — TELEPHONE (OUTPATIENT)
Dept: PULMONOLOGY | Facility: CLINIC | Age: 61
End: 2021-05-14

## 2021-05-14 NOTE — LETTER
PHYSICIAN ORDERS      DATE & TIME ISSUED: May 14, 2021 3:43 PM  PATIENT NAME: Kecia Blue   : 1960     [facility] MR# [if applicable]: [id number]     DIAGNOSIS:  Lung Transplant  Z94.2  Patient needs pre-procedure COVID 19 PCR test done on 21. Date of procedure: 21      Any questions please call: Mikayla     Please fax these results to [phone].        Ame Chow PA-C

## 2021-05-14 NOTE — LETTER
PHYSICIAN ORDERS      DATE & TIME ISSUED: May 14, 2021 3:43 PM  PATIENT NAME: Kecia Blue   : 1960     [facility] MR# [if applicable]: [id number]     DIAGNOSIS:  Lung Transplant  Z94.2  Patient needs pre-procedure COVID 19 PCR test done on 21. Date of procedure: 21      Any questions please call: Mikayla     Please fax these results to [phone].        Ame Chow PA-C

## 2021-05-14 NOTE — TELEPHONE ENCOUNTER
Spoke with patient to schedule procedure with Dr. Mati Norris   Procedure was scheduled on 06/11 at [facility] OR  Patient will have H&P with Transplant Pulm team    Patient is aware a COVID-19 test is needed before their procedure. The test should be with-in 4 days of their procedure.   Test Details: TBD    Patient is aware a / is needed day of surgery.   Surgery letter was sent via [employer], patient has my direct contact information for any further questions.

## 2021-05-18 DIAGNOSIS — Z11.59 ENCOUNTER FOR SCREENING FOR OTHER VIRAL DISEASES: ICD-10-CM

## 2021-05-19 ENCOUNTER — TELEPHONE (OUTPATIENT)
Dept: TRANSPLANT | Facility: CLINIC | Age: 61
End: 2021-05-19

## 2021-05-19 NOTE — TELEPHONE ENCOUNTER
Patient Call: Medication Refill  Route to LPN  Instruct the patient to first contact their pharmacy. If they have called their pharmacy and require further assistance, route to LPN.    Pharmacy Name: [facility]   Pharmacy Location: [place], [place]   Name of Medication: Metoprolol titrate  Dose: 25 mg  Monthly   When will the patient be out of this medication?: Greater than 3 days (Route standard priority)

## 2021-05-24 ENCOUNTER — TELEPHONE (OUTPATIENT)
Dept: TRANSPLANT | Facility: CLINIC | Age: 61
End: 2021-05-24

## 2021-05-24 DIAGNOSIS — I10 BENIGN ESSENTIAL HYPERTENSION: ICD-10-CM

## 2021-05-24 RX ORDER — METOPROLOL TARTRATE 25 MG/1
25 TABLET, FILM COATED ORAL 2 TIMES DAILY
Qty: 60 TABLET | Refills: 11 | Status: SHIPPED | OUTPATIENT
Start: 2021-05-24 | End: 2022-06-21

## 2021-05-24 NOTE — TELEPHONE ENCOUNTER
Patient Call: Medication Refill  Route to LPN  Instruct the patient to first contact their pharmacy. If they have called their pharmacy and require further assistance, route to LPN.    Pharmacy Name: [facility]  Pharmacy Location: [place], [place]  Name of Medication: Metoprolol 25 mg   When will the patient be out of this medication?: Less than 3 days (Route high priority)

## 2021-05-24 NOTE — PROGRESS NOTES
Reason for Visit  Kecia Blue is a 60-year-old female who is being seen for RECHECK (follow up pre op)    Lung TX HPI  Kecia Blue is a 60-year-old female with a history of dermatomyositis, seronegative rheumatoid arthritis, interstitial lung disease, and pulmonary hypertension who was admitted to the hospital with acute worsening of chronic hypoxic respiratory failure in 02/2018 and progressed to [facility] for right heart failure and subsequently underwent bilateral sequential lung transplant on 03/01/2018.     She has had significant narrowing of right bronchial anastomosis with significant granulation tissue blocking the mainstem.  She has required multiple dilations and stent placement. She has had CMV viremia.    She was admitted in 10/2019 with increasing chest pain and cough. 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 She then had RADHA on CKD leading to iHD since 10/2019. She also has required paracentesis (intermittently) since 11/2019.    She was admitted for ARDS from [facility] in 3/2021. Required Trach (2/12), PEGJ (2/15) and dilation of Rt. Mainstem stenosis (2/19) and Acute rehab on 2/25/21. She was decannulated on 3/4/21 and discharged on 1L O2 with exertion. GJ was removed on 4/8/21.    She was admitted on 4/29/21 for Thoracentesis and Chest tube placement. She underwent bronch CO2 laser to release a band of granulation tissue on rt. Mainstem bronchus on 4/30/21. Chest tube placed on 4/29/21, Ampho b beads instilled again on 5/7/21. The Pleural fluid was consistent with exudate physiology.     Interval HPI   Since the hospital she has been doing well.  She feels SOB with exercising (Treadmill at 2mph). She has minimal cough. She denies any chest pains or wheezing.  She has been sleeping well with no issues with her pulmonary symptoms.  She still has leg swelling.  She is doing dialysis 3 times a week.    She has gained 7# since hospital stay.    Current  Outpatient Medications   Medication     acetaminophen (TYLENOL) 325 MG tablet     albuterol (PROVENTIL) (2.5 MG/3ML) 0.083% neb solution     blood glucose (NO BRAND SPECIFIED) test strip     blood glucose (NO BRAND SPECIFIED) test strip     budesonide (PULMICORT) 0.5 MG/2ML neb solution     fluticasone (FLONASE) 50 MCG/ACT nasal spray     Magnesium Cl-Calcium Carbonate (SLOW-MAG) 71.5-119 MG TBEC     metoprolol tartrate (LOPRESSOR) 25 MG tablet     multivitamin RENAL (RENAVITE RX/NEPHROVITE) 1 MG tablet     ondansetron (ZOFRAN-ODT) 4 MG ODT tab     pantoprazole (PROTONIX) 40 MG EC tablet     posaconazole (NOXAFIL) 100 MG EC tablet     predniSONE (DELTASONE) 2.5 MG tablet     predniSONE (DELTASONE) 5 MG tablet     sulfamethoxazole-trimethoprim (BACTRIM) 400-80 MG tablet     tacrolimus (GENERIC EQUIVALENT) 0.5 MG capsule     tacrolimus (GENERIC EQUIVALENT) 1 MG capsule     No current facility-administered medications for this visit.        No Known Allergies      Past Medical History:   Diagnosis Date     Acute on chronic respiratory failure with hypoxia (H) 02/21/2018     Anisocoria      Antisynthetase syndrome (H) 2014     Anxiety      Aspergillus (H)      Aspergillus pneumonia (H) 11/20/2020     Benign essential hypertension      C. difficile colitis      Cytomegalovirus (CMV) viremia (H)      Dermatomyositis (H)      Dysplasia of cervix, low grade (ESTRADA 1)      EBV (Franklin-Barr virus) viremia      ESRD (end stage renal disease) on dialysis (H)      ILD (interstitial lung disease) (H)      Lung replaced by transplant (H)      Osteopenia      Paroxysmal atrial fibrillation (H)      Pneumocystis jiroveci pneumonia (H)      PONV (postoperative nausea and vomiting)      Post-menopause      Pulmonary hypertension (H)      Raynaud's disease      Seronegative rheumatoid arthritis (H)        Past Surgical History:   Procedure Laterality Date     BRONCHOSCOPY (RIGID OR FLEXIBLE), DIAGNOSTIC N/A 4/10/2018    Procedure:  COMBINED BRONCHOSCOPY (RIGID OR FLEXIBLE), LAVAGE;;  Surgeon: Mariposa Donohue MD;  [facility]: [facility]     BRONCHOSCOPY (RIGID OR FLEXIBLE), DIAGNOSTIC N/A 12/23/2020    Procedure: BRONCHOSCOPY, WITH BRONCHOALVEOLAR LAVAGE;  Surgeon: Uri Bass MD;  Location: [facility]     BRONCHOSCOPY (RIGID OR FLEXIBLE), DILATE BRONCHUS / TRACHEA N/A 10/11/2018    Procedure: BRONCHOSCOPY (RIGID OR FLEXIBLE), DILATE BRONCHUS / TRACHEA;  Flexible And Rigid Bronchoscopy and Dilation;  Surgeon: Wilber Lin MD;  [facility]     BRONCHOSCOPY FLEXIBLE N/A 3/13/2018    Procedure: BRONCHOSCOPY FLEXIBLE;  Flexible Bronchoscopy ;  Surgeon: Gissell Sanchez MD;  [facility]: [facility]     BRONCHOSCOPY FLEXIBLE N/A 5/9/2018    Procedure: BRONCHOSCOPY FLEXIBLE;;  Surgeon: Wilber Lin MD;  [facility]     BRONCHOSCOPY FLEXIBLE AND RIGID N/A 9/10/2018    Procedure: BRONCHOSCOPY FLEXIBLE AND RIGID;  Flexible and Rigid Bronchoscopy with Balloon Dilation, tissue debulking with cryo, and Right mainstem bronchus stent placement;  Surgeon: Wilber Lin MD;  [facility]     BRONCHOSCOPY RIGID N/A 6/6/2018    Procedure: BRONCHOSCOPY RIGID;;  Surgeon: Lopez Macias MD;  [facility]: [facility]     BRONCHOSCOPY, DILATE BRONCHUS, STENT BRONCHUS, COMBINED N/A 6/11/2018    Procedure: COMBINED BRONCHOSCOPY, DILATE BRONCHUS, STENT BRONCHUS;  Flexible Bronchoscopy, Balloon Dilation, Bronchial Washings;  Surgeon: Wilber Lin MD;  [facility]     BRONCHOSCOPY, DILATE BRONCHUS, STENT BRONCHUS, COMBINED Right 7/10/2018    Procedure: COMBINED BRONCHOSCOPY, DILATE BRONCHUS, STENT BRONCHUS;  Flexible Bronchoscopy, Balloon Dilation, Bronchial Washings  ;  Surgeon: Wilber Lin MD;  [facility]     BRONCHOSCOPY, DILATE BRONCHUS, STENT BRONCHUS, COMBINED N/A 8/2/2018    Procedure: COMBINED BRONCHOSCOPY, DILATE BRONCHUS, STENT BRONCHUS;  Flexible Bronchoscopy, Bronchial Washings, Balloon Dilation;   Surgeon: Wilber Lin MD;  [facility]     BRONCHOSCOPY, DILATE BRONCHUS, STENT BRONCHUS, COMBINED N/A 8/20/2018    Procedure: COMBINED BRONCHOSCOPY, DILATE BRONCHUS, STENT BRONCHUS;  Flexible Bronchoscopy, Balloon Dilation;  Surgeon: Wilber Lin MD;  [facility]     BRONCHOSCOPY, DILATE BRONCHUS, STENT BRONCHUS, COMBINED N/A 10/29/2018    Procedure: Flexible Bronchoscopy, Balloon Dilation, Stent Revision, Airway Examination And Therapeutic Suctioning, Cyro Tumor Debulking;  Surgeon: Wilber Lin MD;  [facility]     BRONCHOSCOPY, DILATE BRONCHUS, STENT BRONCHUS, COMBINED N/A 11/20/2018    Procedure: Rigid Bronchoscopy, Stent Removal and dilitation;  Surgeon: Wilber Lin MD;  [facility]     BRONCHOSCOPY, DILATE BRONCHUS, STENT BRONCHUS, COMBINED N/A 12/14/2018    Procedure: Flexible And Rigid Bronchoscopy, Balloon Dilation, Bronchial Washing;  Surgeon: Wilber Lin MD;  [facility]     BRONCHOSCOPY, DILATE BRONCHUS, STENT BRONCHUS, COMBINED N/A 1/17/2019    Procedure: Flexible And Rigid Bronchoscopy, Balloon Dilation.;  Surgeon: Wilber Lin MD;  [facility]     BRONCHOSCOPY, DILATE BRONCHUS, STENT BRONCHUS, COMBINED N/A 3/7/2019    Procedure: Flexible and Rigid Bronchoscopy, Bronchial Washing, Balloon Dilation;  Surgeon: Wilber Lin MD;  [facility]     BRONCHOSCOPY, DILATE BRONCHUS, STENT BRONCHUS, COMBINED N/A 6/6/2019    Procedure: Rigid and Flexible Bronchoscopy, Balloon Dilation;  Surgeon: Wilber Lin MD;  [facility]     BRONCHOSCOPY, DILATE BRONCHUS, STENT BRONCHUS, COMBINED N/A 10/11/2019    Procedure: Flexible and Rigid Bronchoscopy, Balloon Dilation, Bronchoalveolar Lagave;  Surgeon: Wilber Lin MD;  [facility]     BRONCHOSCOPY, DILATE BRONCHUS, STENT BRONCHUS, COMBINED N/A 2/19/2021    Procedure: BRONCHOSCOPY, flexible, airway dilation, and bronchial wash;  Surgeon:  Wilber Lin MD;  [facility]     BRONCHOSCOPY, DILATE BRONCHUS, STENT BRONCHUS, COMBINED N/A 4/9/2021    Procedure: BRONCHOSCOPY, flexible and rigid, Airway suctioning;  Surgeon: Mati Norris MD;  [facility]     CV RIGHT HEART CATH MEASUREMENTS RECORDED N/A 3/10/2020    Procedure: CV RIGHT HEART CATH;  Surgeon: Wai Garcia MD;  [facility]     ENT SURGERY      tonsillectomy as a child     ESOPHAGOSCOPY, GASTROSCOPY, DUODENOSCOPY (EGD), COMBINED N/A 10/29/2018    Procedure: COMBINED ESOPHAGOSCOPY, GASTROSCOPY, DUODENOSCOPY (EGD) with biopsies and polypectomy;  Surgeon: Chente Bloom MD;  [facility]     INSERT EXTRACORPORAL MEMBRANE OXYGENATOR ADULT (OUTSIDE OR) N/A 2/27/2018    Procedure: INSERT EXTRACORPORAL MEMBRANE OXYGENATOR ADULT (OUTSIDE OR);  INSERT EXTRACORPORAL MEMBRANE OXYGENATOR ADULT (OUTSIDE OR) ;  Surgeon: Toby Hernandez MD;  [facility]     IR CVC TUNNEL PLACEMENT > 5 YRS OF AGE  10/25/2019     IR DIALYSIS FISTULOGRAM LEFT  3/2/2021     IR DIALYSIS MECH THROMB, PTA  3/2/2021     IR FLUORO 0-1 HOUR  5/7/2021     IR GASTRO JEJUNOSTOMY TUBE PLACEMENT  2/16/2021     IR PARACENTESIS  1/8/2020     IR THORACENTESIS  9/13/2019     IR TRANSCATHETER BIOPSY  1/8/2020     LASER CO2 BRONCHOSCOPY N/A 4/30/2021    Procedure: Flexible and Rigid Bronchoscopy and Tissue Debulking with CO2 Laser Assistance;  Surgeon: Mati Norris MD;  [facility]     no prior surgery       REMOVE EXTRACORPORAL MEMBRANE OXYGENATOR ADULT N/A 3/3/2018    Procedure: REMOVE EXTRACORPORAL MEMBRANE OXYGENATOR ADULT;  Removal of Right Femoral Venous and Right Axillary Arterial Extracorporeal Membrane Oxygenator;  Surgeon: Toby Hernandez MD;  [facility]     TRANSPLANT LUNG RECIPIENT SINGLE X2 Bilateral 3/1/2018    Procedure: TRANSPLANT LUNG RECIPIENT SINGLE X2;  Median Sternotomy, Extracorporeal Membrane Oxygenator, bilateral  sequential lung transplant;  Surgeon: Toby Hernandez MD;  Location:  OR       Social History     Socioeconomic History     Marital status:      Spouse name: Not on file     Number of children: Not on file     Years of education: Not on file     Highest education level: Not on file   Occupational History     Not on file   Social Needs     Financial resource strain: Not on file     Food insecurity     Worry: Not on file     Inability: Not on file     Transportation needs     Medical: Not on file     Non-medical: Not on file   Tobacco Use     Smoking status: Never Smoker     Smokeless tobacco: Never Used   Substance and Sexual Activity     Alcohol use: No     Alcohol/week: 1.0 standard drinks     Types: 1 Glasses of wine per week     Drug use: No     Sexual activity: Not on file   Lifestyle     Physical activity     Days per week: Not on file     Minutes per session: Not on file     Stress: Not on file   Relationships     Social connections     Talks on phone: Not on file     Gets together: Not on file     Attends Nondenominational service: Not on file     Active member of club or organization: Not on file     Attends meetings of clubs or organizations: Not on file     Relationship status: Not on file     Intimate partner violence     Fear of current or ex partner: Not on file     Emotionally abused: Not on file     Physically abused: Not on file     Forced sexual activity: Not on file   Other Topics Concern     Parent/sibling w/ CABG, MI or angioplasty before 65F 55M? No   Social History Narrative    3/6/2019 - Lives with . Has three daughters. Four grandchildren (two ). No pets. Travelled previously to [place], [place]. Has visited [place] several times.        Family History   Problem Relation Age of Onset     Hypertension Mother      Arthritis Mother      Pancreatic Cancer Father      Diabetes Father        Pulmonary ROS  Constitutional- Positive. Gained weight  Eyes- Negative  Ear, nose  and throat- Positive. Rhinorrhea.  Cardiac- Negative  Pulm- See HPI  GI- Negative.  Genitourinary- Negative  Musculoskeletal- Negative.  Neurology- Negative  Dermatology- Negative  Endocrine- Negative  Lymphatic- Negative  Psychiatry- Negative  A complete ROS was otherwise negative except as noted in the HPI.    LMP 06/07/2014 (Exact Date)  109/77, Weight before iHD 52.4kg.    GENERAL: Healthy, alert and no distress  EYES: Eyes grossly normal to inspection.  No discharge or erythema, or obvious scleral/conjunctival abnormalities.  RESP: No audible wheeze, cough, or visible cyanosis.  No visible retractions or increased work of breathing.    SKIN: Visible skin clear. No significant rash, abnormal pigmentation or lesions.  NEURO: Cranial nerves grossly intact.  Mentation and speech appropriate for age.  PSYCH: Mentation appears normal, affect normal/bright, judgement and insight intact, normal speech and appearance well-groomed.    Results  Recent Results (from the past 168 hour(s))   COVID-19 Virus (Coronavirus) by PCR (External Result)    Collection Time: 06/07/21  1:10 PM   Result Value Ref Range    COVID-19 Virus by PCR (External Result) Undetected Undetected   COVID-19 VIRUS (CORONAVIRUS) BY PCR - EXTERNAL RESULT    Collection Time: 06/07/21  1:10 PM   Result Value Ref Range    Specimen Source Nasopharynx     COVID-19 Virus PCR to [place] - Result Undetected Undetected    SARS-CoV-2 PCR Comment SEE COMMENTS    CBC with platelets differential    Collection Time: 06/09/21 11:22 AM   Result Value Ref Range    WBC Count (External) 6.5 4.5 - 11.0 10(3)/uL    RBC Count (External) 3.68 (L) 4.00 - 5.20 10(6)/uL    Hemoglobin (External) 11.6 (L) 12.0 - 16.0 g/dL    Hematocrit (External) 35.9 33.0 - 51.0 %    MCV (External) 97.6 80.0 - 100.0 fL    MCH (External) 31.5 26.0 - 340 pg    MCHC (External) 32.3 32.0 - 36.0 g/dL    RDW (External) 15.9 (H) 11.5 - 13.1 %    Platelet Count (External) 198 150 - 450 10(3)/uL    %  Neutrophils (External) 69.0 35.0 - 77.0 %    % Lymphocytes (External) 16.6 (L) 24.0 - 44.0 %    % Monocytes (External) 12.1 7.0 - 13.0 %    % Eosinophils (External) 1.2 0.0 - 3.0 %    % Basophils (External) 0.5 0.0 - 1.0 %    % Immature Granulocytes (External) 0.6 %    Absolute Neutrophils (External) 4.5 1.5 - 8.5 10(3)/uL    Absolute Lymphocytes (External) 1.1 1.1 - 5.0 10(3)/uL    Absolute Monocytes (External) 0.8 (H) 0.1 - 0.7 10(3)/uL    Absolute Eosinophils (External) 0.1 0.0 - 0.3 10(3)/uL    Absolute Basophils (External) 0.0 0.0 - 0.1 10(3)/uL    Absolute Immature Granulocytes (External) 0.04 10(3)/uL   Basic metabolic panel    Collection Time: 06/09/21 11:22 AM   Result Value Ref Range    Sodium (External) 137 136 - 145 mmol/L    Potassium (External) 4.6 3.5 - 5.1 mmol/L    Chloride (External) 98 98 - 107 mmol/L    CO2 (External) 26 20 - 31 mmol/L    Anion Gap (External) 17.6 mmol/L    Creatinine (External) 5.50 (HH) 0.55 - 1.02 mg/dL    Urea Nitrogen (External) 71.9 (H) 7.0 - 26.0 mg/dL    BUN/Creatinine Ratio (External) 13.07 ratio    Calcium (External) 9.0 8.4 - 10.2 mg/dL    Glucose (External) 115 (H) 70 - 105 mg/dL    GFR Estimated (External) 8 (L) >=60 ml/min/1.73m2   Hepatic panel    Collection Time: 06/09/21 11:22 AM   Result Value Ref Range    Protein Total (External) 8.0 6.4 - 8.3 g/dL    Albumin (External) 3.5 3.5 - 5.0 g/dL    AST (External) 22 5 - 34 U/L    ALT (External) 46 0 - 55 U/L    Alk Phosphatase (External) 313 (HH) 40 - 150 U/L    Bilirubin Total (External) 0.7 0.2 - 1.2 mg/dL   Magnesium    Collection Time: 06/09/21 11:22 AM   Result Value Ref Range    Magnesium (External) 1.8 1.6 - 2.6 mg/dL   Phosphorus    Collection Time: 06/09/21 11:22 AM   Result Value Ref Range    Phosphorus (External) 5.4 (H) 2.3 - 4.7 mg/dL         Assessment and Plan: Kecia Blue is a 60-year-old female with a history of dermatomyositis, seronegative rheumatoid arthritis, interstitial lung disease, and  pulmonary hypertension who was admitted to the hospital with acute worsening of chronic hypoxic respiratory failure in 02/2018 and progressed [facility] for right heart failure and subsequently underwent bilateral sequential lung transplant on 03/01/2018. She has had significant narrowing of right bronchial anastomosis with significant granulation tissue blocking the mainstem.  She has required multiple dilations and stent placement.   She is currently on Azoles for left aspergillus empyema, ESRD on iHD and needing intermittent paracentesis due to unclear etiology.    #. Bilateral Lung Transplant: She severe restriction with low PFT's. Etiology unclear - ?chest wall restriction. Possible diaphram weakness/paralysis. Deconditioning might be playing a role.  Current IS regimen:   - Tacrolimus with a goal of 8 - 10 nanograms/mL and prednisone. Off MMF due to recurrent N/V. AZA held due to leucopenia since atleast 2020.  CLAD: On Azithromycin.    DSA positive with DQB7 previously. Last DSA checked was neg (5/1/21).    6/10/2021: We could consider Restarting AZA as WBC is > 3.5 but with aspergillus empyema I'm hesitant to do so. Pulm sx are stable. We will follow up in three months and reassess.    #. Right Main Bronchial Stenosis S/P Dilatation: Granulation tissue at the Rt. mainstem opening - it was removed. Last Dilation on 3/2019.  6/10/2021: Sx stable. Continue Alb BID, Pulmicort BID.     #. Ascites: Has required intermittent paracentesis since 11/2019. She had Transjugular liver biopsy which showed congestive hepatopathy and Wedge pressure was 21 and free hepatic vein pressures of 7.   6/10/2021: Has not required paracentesis in a long time.    #. Infectious Disease:   Actinomyces (noted on BAL on 8/2018 and 7/2018) and Gardnerella vaginalis on BAL in 8/2018.  Prophylaxis: On Bactrim 3x/week.  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  H/o C. diff colitis: Finished a 10day course of Oral  Vancomycin.  H/o left-sided Aspergillus empyema (10/08/2019): Placed antimicrobial beads in Rt. pleural effusion. We will continue Posaconazole 300mg/daily. Has persistent Rt. sided loculated effusion.  - followed by ID (last seen on 4/13/21).   - Chest tube placement followed by ampho b bead placement on 5/7/21. The Fluid was exudate and KOH stain positive for fungal elements.  - Repeat CT around mid to late 8/2021.  EBV viremia: Follow intermittently. it was ~38k on 5/1/21.    #. Provoked Left Upper Extremity Clot and Right IJ Clot (3/7/18): completed 6months of anticoag (Coumadin).    #. Dermatomyositis with Raynaud's Phenomenon: Followed by rheumatology.  - Myositis panel was positive.    #. Elevated Alk phos: Overall stable. Most likely due to Voriconazole. Will monitor.     #. ESRD on iHD (since 10/2019): Followed by Nephrology. Access is AVF with partial graft s/p clot removal by IR on 3/2/21. Continues now on iHD T/TH/Sa  # Hypomagnesemia: On Slow Mag 3 tabs on non-dialysis days.    #. HTN: Well controlled on Metoprolol alone.  #. Paroxysmal AFib: Again noted in 3/2021 during acute illness (CHADSVASC: 1, sex). it also occurred after lung tx surgery in 2018.   #. Frequent runs of SVT - noted on Ziopatch (1.6%).    -- Continue Metoprolol per EP recs.    #. Elevated Alk phos: Since 2019. W/u in the past.    #. Thrombocytopenia: Unclear etiology. Intermittent. Will monitor for now. If it continues to drop might benefit from Hematology input.    # Health Care Maintenance: She has had C19 vaccination.  - Due for DEXA this year (2021).    RTC in 12 weeks with Annuals (She just did a 6MWT few weeks ago - hence will not repeat it). Obtain Chest CT in 8/2021.

## 2021-05-27 ENCOUNTER — TELEPHONE (OUTPATIENT)
Dept: TRANSPLANT | Facility: CLINIC | Age: 61
End: 2021-05-27

## 2021-05-27 DIAGNOSIS — Z94.2 S/P LUNG TRANSPLANT (H): ICD-10-CM

## 2021-05-27 DIAGNOSIS — Z79.899 ENCOUNTER FOR LONG-TERM (CURRENT) USE OF HIGH-RISK MEDICATION: ICD-10-CM

## 2021-05-27 PROCEDURE — 80197 ASSAY OF TACROLIMUS: CPT | Performed by: INTERNAL MEDICINE

## 2021-05-27 NOTE — TELEPHONE ENCOUNTER
DATE:  5/27/2021   TIME OF RECEIPT FROM LAB:  1:15  LAB TEST:  Alk phos  LAB VALUE:  280  RESULTS GIVEN WITH READ-BACK TO (PROVIDER):  Mikayla Latham  TIME LAB VALUE REPORTED TO PROVIDER:   1:15

## 2021-05-30 LAB
TACROLIMUS BLD-MCNC: 8.2 UG/L (ref 5–15)
TME LAST DOSE: NORMAL H

## 2021-05-31 LAB
FUNGUS SPEC CULT: NORMAL
SPECIMEN SOURCE: NORMAL

## 2021-06-01 NOTE — RESULT ENCOUNTER NOTE
Armand Mcdonnell, your tacrolimus level was 8.2 at 12 hours on 5/27/21 which is within your goal range of 8-10. No dose change at this time. Please call the transplant office ([phone]) with any questions. Thanks, Mikayla

## 2021-06-09 ENCOUNTER — TELEPHONE (OUTPATIENT)
Dept: TRANSPLANT | Facility: CLINIC | Age: 61
End: 2021-06-09

## 2021-06-09 PROCEDURE — 80197 ASSAY OF TACROLIMUS: CPT | Performed by: PHYSICIAN ASSISTANT

## 2021-06-09 NOTE — TELEPHONE ENCOUNTER
DATE:  6/9/2021   TIME OF RECEIPT FROM LAB:  3195  LAB TEST:  Alk Phos,Cr  LAB VALUE:  Alk Phos=313,Cr=5.5  RESULTS GIVEN WITH READ-BACK TO:Gaye Castaneda  TIME LAB VALUE REPORTED TO PROVIDER:   3138

## 2021-06-09 NOTE — TELEPHONE ENCOUNTER
Paged re: patient's critical labs.   Patient Alk phos elevated, at patient's baseline, has had hepatic workup.   Creatinine elevated - patient on dialysis.

## 2021-06-10 ENCOUNTER — VIRTUAL VISIT (OUTPATIENT)
Dept: TRANSPLANT | Facility: CLINIC | Age: 61
End: 2021-06-10
Attending: PHYSICIAN ASSISTANT
Payer: COMMERCIAL

## 2021-06-10 DIAGNOSIS — Z94.2 S/P LUNG TRANSPLANT (H): Primary | ICD-10-CM

## 2021-06-10 DIAGNOSIS — Z94.2 S/P LUNG TRANSPLANT (H): ICD-10-CM

## 2021-06-10 LAB
TACROLIMUS BLD-MCNC: 11.2 UG/L (ref 5–15)
TME LAST DOSE: NORMAL H

## 2021-06-10 PROCEDURE — 99214 OFFICE O/P EST MOD 30 MIN: CPT | Mod: 95 | Performed by: INTERNAL MEDICINE

## 2021-06-10 RX ORDER — BUDESONIDE 0.5 MG/2ML
0.5 INHALANT ORAL 2 TIMES DAILY
Qty: 2 ML | Refills: 4 | Status: SHIPPED | OUTPATIENT
Start: 2021-06-10 | End: 2021-06-14

## 2021-06-10 RX ORDER — ALBUTEROL SULFATE 0.83 MG/ML
2.5 SOLUTION RESPIRATORY (INHALATION) 2 TIMES DAILY
Qty: 360 ML | Refills: 4 | Status: SHIPPED | OUTPATIENT
Start: 2021-06-10 | End: 2022-02-10

## 2021-06-10 NOTE — NURSING NOTE
Transplant Coordinator Note- video visit     Reason for visit: Post lung transplant follow up visit   Coordinator: Present  Caregiver: Not present     Health concerns addressed today:  1. No changes in health since hospital stay.   2. Breathing fine, better than it has been for a while she thinks. SOB if overexerted, with exercise. Can breath deeper than before.   3. Trying to gain some weight. Gained 7# since hospital stay. 52.4 kg.   4. 109/77 BP at dialysis.   5. Albuterol and Pulmicort nebs BID.     Activity/rehab: up [place], hour per day. Treadmill, bicycle, weights.   Oxygen needs: room air  Pain management/RX: Denies  High risk donor: Yes  CMV status: D+/R+  DVT/PE: H/o DVT  Post op AFIB/follow up with EP: One time occurrence of a-fib  PJP prophylactic: Dapsone     Pt Education: medications (use/dose/side effects), how/when to call coordinator, frequency of labs, s/s of infection/rejection, call prior to starting any new medications, lab/vital sign book     Health Maintenance:     Last colonoscopy:     Next colonoscopy due:     Dermatology:    Vaccinations this visit:      Labs, CXR, PFTs reviewed with patient  Medication record reviewed and reconciled  Questions and concerns addressed     Patient Instructions  1. Continue to hydrate with 60-70 oz fluids daily.  2. Continue to exercise daily or most days of the week.  3. Follow up with your primary care provider for annual gender health maintenance procedures.  4. Follow up with colonoscopy schedule.  5. Follow up with annual dermatology visits.  6. CT scan in August, Mikayla will send order to your local clinic unless you need to come to the clinic then for something else.     Next transplant clinic appointment: 3 months with CXR, labs and PFTs  Next lab draw: monthly    AVS printed at time of check out

## 2021-06-10 NOTE — PATIENT INSTRUCTIONS
Patient Instructions  1. Continue to hydrate with 60-70 oz fluids daily.  2. Continue to exercise daily or most days of the week.  3. Follow up with your primary care provider for annual gender health maintenance procedures.  4. Follow up with colonoscopy schedule.  5. Follow up with annual dermatology visits.  6. CT scan in August, Mikayla will send order to your local clinic unless you need to come to the clinic then for something else.     Next transplant clinic appointment: 3 months with CXR, labs and PFTs  Next lab draw: monthly

## 2021-06-10 NOTE — LETTER
6/10/2021         RE: Kecia Blue  [id number] [facility] [id number]        Dear Colleague,    Thank you for referring your patient, Kecia Blue, to the [facility]. Please see a copy of my visit note below.    Reason for Visit  Kecia Blue is a 60-year-old female who is being seen for RECHECK (follow up pre op)    Lung TX HPI  Kecia Blue is a 60-year-old female with a history of dermatomyositis, seronegative rheumatoid arthritis, interstitial lung disease, and pulmonary hypertension who was admitted to the hospital with acute worsening of chronic hypoxic respiratory failure in 02/2018 and progressed to [facility] for right heart failure and subsequently underwent bilateral sequential lung transplant on 03/01/2018.     She has had significant narrowing of right bronchial anastomosis with significant granulation tissue blocking the mainstem.  She has required multiple dilations and stent placement. She has had CMV viremia.    She was admitted in 10/2019 with increasing chest pain and cough. 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 She then had RADHA on CKD leading to iHD since 10/2019. She also has required paracentesis (intermittently) since 11/2019.    She was admitted for ARDS from [facility] in 3/2021. Required Trach (2/12), PEGJ (2/15) and dilation of Rt. Mainstem stenosis (2/19) and Acute rehab on 2/25/21. She was decannulated on 3/4/21 and discharged on 1L O2 with exertion. GJ was removed on 4/8/21.    She was admitted on 4/29/21 for Thoracentesis and Chest tube placement. She underwent bronch CO2 laser to release a band of granulation tissue on rt. Mainstem bronchus on 4/30/21. Chest tube placed on 4/29/21, Ampho b beads instilled again on 5/7/21. The Pleural fluid was consistent with exudate physiology.     Interval HPI   Since the hospital she has been doing well.  She feels SOB with exercising (Treadmill at  2mph). She has minimal cough. She denies any chest pains or wheezing.  She has been sleeping well with no issues with her pulmonary symptoms.  She still has leg swelling.  She is doing dialysis 3 times a week.    She has gained 7# since hospital stay.    Current Outpatient Medications   Medication     acetaminophen (TYLENOL) 325 MG tablet     albuterol (PROVENTIL) (2.5 MG/3ML) 0.083% neb solution     blood glucose (NO BRAND SPECIFIED) test strip     blood glucose (NO BRAND SPECIFIED) test strip     budesonide (PULMICORT) 0.5 MG/2ML neb solution     fluticasone (FLONASE) 50 MCG/ACT nasal spray     Magnesium Cl-Calcium Carbonate (SLOW-MAG) 71.5-119 MG TBEC     metoprolol tartrate (LOPRESSOR) 25 MG tablet     multivitamin RENAL (RENAVITE RX/NEPHROVITE) 1 MG tablet     ondansetron (ZOFRAN-ODT) 4 MG ODT tab     pantoprazole (PROTONIX) 40 MG EC tablet     posaconazole (NOXAFIL) 100 MG EC tablet     predniSONE (DELTASONE) 2.5 MG tablet     predniSONE (DELTASONE) 5 MG tablet     sulfamethoxazole-trimethoprim (BACTRIM) 400-80 MG tablet     tacrolimus (GENERIC EQUIVALENT) 0.5 MG capsule     tacrolimus (GENERIC EQUIVALENT) 1 MG capsule     No current facility-administered medications for this visit.        No Known Allergies      Past Medical History:   Diagnosis Date     Acute on chronic respiratory failure with hypoxia (H) 02/21/2018     Anisocoria      Antisynthetase syndrome (H) 2014     Anxiety      Aspergillus (H)      Aspergillus pneumonia (H) 11/20/2020     Benign essential hypertension      C. difficile colitis      Cytomegalovirus (CMV) viremia (H)      Dermatomyositis (H)      Dysplasia of cervix, low grade (ESTRADA 1)      EBV (Franklin-Barr virus) viremia      ESRD (end stage renal disease) on dialysis (H)      ILD (interstitial lung disease) (H)      Lung replaced by transplant (H)      Osteopenia      Paroxysmal atrial fibrillation (H)      Pneumocystis jiroveci pneumonia (H)      PONV (postoperative nausea and  vomiting)      Post-menopause      Pulmonary hypertension (H)      Raynaud's disease      Seronegative rheumatoid arthritis (H)        Past Surgical History:   Procedure Laterality Date     BRONCHOSCOPY (RIGID OR FLEXIBLE), DIAGNOSTIC N/A 4/10/2018    Procedure: COMBINED BRONCHOSCOPY (RIGID OR FLEXIBLE), LAVAGE;;  Surgeon: Mariposa Donohue MD;  [facility]     BRONCHOSCOPY (RIGID OR FLEXIBLE), DIAGNOSTIC N/A 12/23/2020    Procedure: BRONCHOSCOPY, WITH BRONCHOALVEOLAR LAVAGE;  Surgeon: Uri Bass MD;  [facility]     BRONCHOSCOPY (RIGID OR FLEXIBLE), DILATE BRONCHUS / TRACHEA N/A 10/11/2018    Procedure: BRONCHOSCOPY (RIGID OR FLEXIBLE), DILATE BRONCHUS / TRACHEA;  Flexible And Rigid Bronchoscopy and Dilation;  Surgeon: Wilber Lin MD;  [facility]     BRONCHOSCOPY FLEXIBLE N/A 3/13/2018    Procedure: BRONCHOSCOPY FLEXIBLE;  Flexible Bronchoscopy ;  Surgeon: Gissell Sanchez MD;  [facility]     BRONCHOSCOPY FLEXIBLE N/A 5/9/2018    Procedure: BRONCHOSCOPY FLEXIBLE;;  Surgeon: Wilber Lin MD;  [facility]     BRONCHOSCOPY FLEXIBLE AND RIGID N/A 9/10/2018    Procedure: BRONCHOSCOPY FLEXIBLE AND RIGID;  Flexible and Rigid Bronchoscopy with Balloon Dilation, tissue debulking with cryo, and Right mainstem bronchus stent placement;  Surgeon: Wilber Lin MD;  [facility]     BRONCHOSCOPY RIGID N/A 6/6/2018    Procedure: BRONCHOSCOPY RIGID;;  Surgeon: Lopez Macias MD;  [facility]     BRONCHOSCOPY, DILATE BRONCHUS, STENT BRONCHUS, COMBINED N/A 6/11/2018    Procedure: COMBINED BRONCHOSCOPY, DILATE BRONCHUS, STENT BRONCHUS;  Flexible Bronchoscopy, Balloon Dilation, Bronchial Washings;  Surgeon: Wilber Lin MD;  [facility]     BRONCHOSCOPY, DILATE BRONCHUS, STENT BRONCHUS, COMBINED Right 7/10/2018    Procedure: COMBINED BRONCHOSCOPY, DILATE BRONCHUS, STENT BRONCHUS;  Flexible Bronchoscopy, Balloon Dilation, Bronchial  Washings  ;  Surgeon: Wilber Lin MD;  [facility]     BRONCHOSCOPY, DILATE BRONCHUS, STENT BRONCHUS, COMBINED N/A 8/2/2018    Procedure: COMBINED BRONCHOSCOPY, DILATE BRONCHUS, STENT BRONCHUS;  Flexible Bronchoscopy, Bronchial Washings, Balloon Dilation;  Surgeon: Wilber Lin MD;  [facility]     BRONCHOSCOPY, DILATE BRONCHUS, STENT BRONCHUS, COMBINED N/A 8/20/2018    Procedure: COMBINED BRONCHOSCOPY, DILATE BRONCHUS, STENT BRONCHUS;  Flexible Bronchoscopy, Balloon Dilation;  Surgeon: Wilber Lin MD;  [facility]     BRONCHOSCOPY, DILATE BRONCHUS, STENT BRONCHUS, COMBINED N/A 10/29/2018    Procedure: Flexible Bronchoscopy, Balloon Dilation, Stent Revision, Airway Examination And Therapeutic Suctioning, Cyro Tumor Debulking;  Surgeon: Wilber Lin MD;  [facility]     BRONCHOSCOPY, DILATE BRONCHUS, STENT BRONCHUS, COMBINED N/A 11/20/2018    Procedure: Rigid Bronchoscopy, Stent Removal and dilitation;  Surgeon: Wilber Lin MD;  [facility]     BRONCHOSCOPY, DILATE BRONCHUS, STENT BRONCHUS, COMBINED N/A 12/14/2018    Procedure: Flexible And Rigid Bronchoscopy, Balloon Dilation, Bronchial Washing;  Surgeon: Wilber Lin MD;  [facility]     BRONCHOSCOPY, DILATE BRONCHUS, STENT BRONCHUS, COMBINED N/A 1/17/2019    Procedure: Flexible And Rigid Bronchoscopy, Balloon Dilation.;  Surgeon: Wilber Lin MD;  [facility]     BRONCHOSCOPY, DILATE BRONCHUS, STENT BRONCHUS, COMBINED N/A 3/7/2019    Procedure: Flexible and Rigid Bronchoscopy, Bronchial Washing, Balloon Dilation;  Surgeon: Wilber Lin MD;  [facility]     BRONCHOSCOPY, DILATE BRONCHUS, STENT BRONCHUS, COMBINED N/A 6/6/2019    Procedure: Rigid and Flexible Bronchoscopy, Balloon Dilation;  Surgeon: Wilber Lin MD;  [facility]     BRONCHOSCOPY, DILATE BRONCHUS, STENT BRONCHUS, COMBINED N/A 10/11/2019    Procedure: Flexible and Rigid  Bronchoscopy, Balloon Dilation, Bronchoalveolar Lagave;  Surgeon: Wilber Lin MD;  [facility]     BRONCHOSCOPY, DILATE BRONCHUS, STENT BRONCHUS, COMBINED N/A 2/19/2021    Procedure: BRONCHOSCOPY, flexible, airway dilation, and bronchial wash;  Surgeon: Wilber Lin MD;  [facility]     BRONCHOSCOPY, DILATE BRONCHUS, STENT BRONCHUS, COMBINED N/A 4/9/2021    Procedure: BRONCHOSCOPY, flexible and rigid, Airway suctioning;  Surgeon: Mati Norris MD;  [facility]     CV RIGHT HEART CATH MEASUREMENTS RECORDED N/A 3/10/2020    Procedure: CV RIGHT HEART CATH;  Surgeon: Wai Garcia MD;  [facility]     ENT SURGERY      tonsillectomy as a child     ESOPHAGOSCOPY, GASTROSCOPY, DUODENOSCOPY (EGD), COMBINED N/A 10/29/2018    Procedure: COMBINED ESOPHAGOSCOPY, GASTROSCOPY, DUODENOSCOPY (EGD) with biopsies and polypectomy;  Surgeon: Chente Bloom MD;  [facility]     INSERT EXTRACORPORAL MEMBRANE OXYGENATOR ADULT (OUTSIDE OR) N/A 2/27/2018    Procedure: INSERT EXTRACORPORAL MEMBRANE OXYGENATOR ADULT (OUTSIDE OR);  INSERT EXTRACORPORAL MEMBRANE OXYGENATOR ADULT (OUTSIDE OR) ;  Surgeon: Toby Hernandez MD;  [facility]     IR CVC TUNNEL PLACEMENT > 5 YRS OF AGE  10/25/2019     IR DIALYSIS FISTULOGRAM LEFT  3/2/2021     IR DIALYSIS MECH THROMB, PTA  3/2/2021     IR FLUORO 0-1 HOUR  5/7/2021     IR GASTRO JEJUNOSTOMY TUBE PLACEMENT  2/16/2021     IR PARACENTESIS  1/8/2020     IR THORACENTESIS  9/13/2019     IR TRANSCATHETER BIOPSY  1/8/2020     LASER CO2 BRONCHOSCOPY N/A 4/30/2021    Procedure: Flexible and Rigid Bronchoscopy and Tissue Debulking with CO2 Laser Assistance;  Surgeon: Mati Norris MD;  [facility]     no prior surgery       REMOVE EXTRACORPORAL MEMBRANE OXYGENATOR ADULT N/A 3/3/2018    Procedure: REMOVE EXTRACORPORAL MEMBRANE OXYGENATOR ADULT;  Removal of Right Femoral Venous and Right Axillary Arterial  Extracorporeal Membrane Oxygenator;  Surgeon: Toby Hernandez MD;  [facility]     TRANSPLANT LUNG RECIPIENT SINGLE X2 Bilateral 3/1/2018    Procedure: TRANSPLANT LUNG RECIPIENT SINGLE X2;  Median Sternotomy, Extracorporeal Membrane Oxygenator, bilateral sequential lung transplant;  Surgeon: Toby Hernandez MD;  Location:  OR       Social History     Socioeconomic History     Marital status:      Spouse name: Not on file     Number of children: Not on file     Years of education: Not on file     Highest education level: Not on file   Occupational History     Not on file   Social Needs     Financial resource strain: Not on file     Food insecurity     Worry: Not on file     Inability: Not on file     Transportation needs     Medical: Not on file     Non-medical: Not on file   Tobacco Use     Smoking status: Never Smoker     Smokeless tobacco: Never Used   Substance and Sexual Activity     Alcohol use: No     Alcohol/week: 1.0 standard drinks     Types: 1 Glasses of wine per week     Drug use: No     Sexual activity: Not on file   Lifestyle     Physical activity     Days per week: Not on file     Minutes per session: Not on file     Stress: Not on file   Relationships     Social connections     Talks on phone: Not on file     Gets together: Not on file     Attends [employer] service: Not on file     Active member of club or organization: Not on file     Attends meetings of clubs or organizations: Not on file     Relationship status: Not on file     Intimate partner violence     Fear of current or ex partner: Not on file     Emotionally abused: Not on file     Physically abused: Not on file     Forced sexual activity: Not on file   Other Topics Concern     Parent/sibling w/ CABG, MI or angioplasty before 65F 55M? No   Social History Narrative    3/6/2019 - Lives with . Has three daughters. Four grandchildren (two ). No pets. Travelled previously to [place], [place]. Has  visited [place] several times.        Family History   Problem Relation Age of Onset     Hypertension Mother      Arthritis Mother      Pancreatic Cancer Father      Diabetes Father        Pulmonary ROS  Constitutional- Positive. Gained weight  Eyes- Negative  Ear, nose and throat- Positive. Rhinorrhea.  Cardiac- Negative  Pulm- See HPI  GI- Negative.  Genitourinary- Negative  Musculoskeletal- Negative.  Neurology- Negative  Dermatology- Negative  Endocrine- Negative  Lymphatic- Negative  Psychiatry- Negative  A complete ROS was otherwise negative except as noted in the HPI.    LMP 06/07/2014 (Exact Date)  109/77, Weight before iHD 52.4kg.    GENERAL: Healthy, alert and no distress  EYES: Eyes grossly normal to inspection.  No discharge or erythema, or obvious scleral/conjunctival abnormalities.  RESP: No audible wheeze, cough, or visible cyanosis.  No visible retractions or increased work of breathing.    SKIN: Visible skin clear. No significant rash, abnormal pigmentation or lesions.  NEURO: Cranial nerves grossly intact.  Mentation and speech appropriate for age.  PSYCH: Mentation appears normal, affect normal/bright, judgement and insight intact, normal speech and appearance well-groomed.    Results  Recent Results (from the past 168 hour(s))   COVID-19 Virus (Coronavirus) by PCR (External Result)    Collection Time: 06/07/21  1:10 PM   Result Value Ref Range    COVID-19 Virus by PCR (External Result) Undetected Undetected   COVID-19 VIRUS (CORONAVIRUS) BY PCR - EXTERNAL RESULT    Collection Time: 06/07/21  1:10 PM   Result Value Ref Range    Specimen Source Nasopharynx     COVID-19 Virus PCR to [place] - Result Undetected Undetected    SARS-CoV-2 PCR Comment SEE COMMENTS    CBC with platelets differential    Collection Time: 06/09/21 11:22 AM   Result Value Ref Range    WBC Count (External) 6.5 4.5 - 11.0 10(3)/uL    RBC Count (External) 3.68 (L) 4.00 - 5.20 10(6)/uL    Hemoglobin (External) 11.6 (L) 12.0 - 16.0  g/dL    Hematocrit (External) 35.9 33.0 - 51.0 %    MCV (External) 97.6 80.0 - 100.0 fL    MCH (External) 31.5 26.0 - 340 pg    MCHC (External) 32.3 32.0 - 36.0 g/dL    RDW (External) 15.9 (H) 11.5 - 13.1 %    Platelet Count (External) 198 150 - 450 10(3)/uL    % Neutrophils (External) 69.0 35.0 - 77.0 %    % Lymphocytes (External) 16.6 (L) 24.0 - 44.0 %    % Monocytes (External) 12.1 7.0 - 13.0 %    % Eosinophils (External) 1.2 0.0 - 3.0 %    % Basophils (External) 0.5 0.0 - 1.0 %    % Immature Granulocytes (External) 0.6 %    Absolute Neutrophils (External) 4.5 1.5 - 8.5 10(3)/uL    Absolute Lymphocytes (External) 1.1 1.1 - 5.0 10(3)/uL    Absolute Monocytes (External) 0.8 (H) 0.1 - 0.7 10(3)/uL    Absolute Eosinophils (External) 0.1 0.0 - 0.3 10(3)/uL    Absolute Basophils (External) 0.0 0.0 - 0.1 10(3)/uL    Absolute Immature Granulocytes (External) 0.04 10(3)/uL   Basic metabolic panel    Collection Time: 06/09/21 11:22 AM   Result Value Ref Range    Sodium (External) 137 136 - 145 mmol/L    Potassium (External) 4.6 3.5 - 5.1 mmol/L    Chloride (External) 98 98 - 107 mmol/L    CO2 (External) 26 20 - 31 mmol/L    Anion Gap (External) 17.6 mmol/L    Creatinine (External) 5.50 (HH) 0.55 - 1.02 mg/dL    Urea Nitrogen (External) 71.9 (H) 7.0 - 26.0 mg/dL    BUN/Creatinine Ratio (External) 13.07 ratio    Calcium (External) 9.0 8.4 - 10.2 mg/dL    Glucose (External) 115 (H) 70 - 105 mg/dL    GFR Estimated (External) 8 (L) >=60 ml/min/1.73m2   Hepatic panel    Collection Time: 06/09/21 11:22 AM   Result Value Ref Range    Protein Total (External) 8.0 6.4 - 8.3 g/dL    Albumin (External) 3.5 3.5 - 5.0 g/dL    AST (External) 22 5 - 34 U/L    ALT (External) 46 0 - 55 U/L    Alk Phosphatase (External) 313 (HH) 40 - 150 U/L    Bilirubin Total (External) 0.7 0.2 - 1.2 mg/dL   Magnesium    Collection Time: 06/09/21 11:22 AM   Result Value Ref Range    Magnesium (External) 1.8 1.6 - 2.6 mg/dL   Phosphorus    Collection Time:  06/09/21 11:22 AM   Result Value Ref Range    Phosphorus (External) 5.4 (H) 2.3 - 4.7 mg/dL         Assessment and Plan: Kecia Blue is a 60-year-old female with a history of dermatomyositis, seronegative rheumatoid arthritis, interstitial lung disease, and pulmonary hypertension who was admitted to the hospital with acute worsening of chronic hypoxic respiratory failure in 02/2018 and progressed to [facility] for right heart failure and subsequently underwent bilateral sequential lung transplant on 03/01/2018. She has had significant narrowing of right bronchial anastomosis with significant granulation tissue blocking the mainstem.  She has required multiple dilations and stent placement.   She is currently on Azoles for left aspergillus empyema, ESRD on iHD and needing intermittent paracentesis due to unclear etiology.    #. Bilateral Lung Transplant: She severe restriction with low PFT's. Etiology unclear - ?chest wall restriction. Possible diaphram weakness/paralysis. Deconditioning might be playing a role.  Current IS regimen:   - Tacrolimus with a goal of 8 - 10 nanograms/mL and prednisone. Off MMF due to recurrent N/V. AZA held due to leucopenia since atleast 2020.  CLAD: On Azithromycin.    DSA positive with DQB7 previously. Last DSA checked was neg (5/1/21).    6/10/2021: We could consider Restarting AZA as WBC is > 3.5 but with aspergillus empyema I'm hesitant to do so. Pulm sx are stable. We will follow up in three months and reassess.    #. Right Main Bronchial Stenosis S/P Dilatation: Granulation tissue at the Rt. mainstem opening - it was removed. Last Dilation on 3/2019.  6/10/2021: Sx stable. Continue Alb BID, Pulmicort BID.     #. Ascites: Has required intermittent paracentesis since 11/2019. She had Transjugular liver biopsy which showed congestive hepatopathy and Wedge pressure was 21 and free hepatic vein pressures of 7.   6/10/2021: Has not required paracentesis in a long time.    #. Infectious  Disease:   Actinomyces (noted on BAL on 8/2018 and 7/2018) and Gardnerella vaginalis on BAL in 8/2018.  Prophylaxis: On Bactrim 3x/week.  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  H/o C. diff colitis: Finished a 10day course of Oral Vancomycin.  H/o left-sided Aspergillus empyema (10/08/2019): Placed antimicrobial beads in Rt. pleural effusion. We will continue Posaconazole 300mg/daily. Has persistent Rt. sided loculated effusion.  - followed by ID (last seen on 4/13/21).   - Chest tube placement followed by ampho b bead placement on 5/7/21. The Fluid was exudate and KOH stain positive for fungal elements.  - Repeat CT around mid to late 8/2021.  EBV viremia: Follow intermittently. it was ~38k on 5/1/21.    #. Provoked Left Upper Extremity Clot and Right IJ Clot (3/7/18): completed 6months of anticoag (Coumadin).    #. Dermatomyositis with Raynaud's Phenomenon: Followed by rheumatology.  - Myositis panel was positive.    #. Elevated Alk phos: Overall stable. Most likely due to Voriconazole. Will monitor.     #. ESRD on iHD (since 10/2019): Followed by Nephrology. Access is AVF with partial graft s/p clot removal by IR on 3/2/21. Continues now on iHD T/TH/Sa  # Hypomagnesemia: On Slow Mag 3 tabs on non-dialysis days.    #. HTN: Well controlled on Metoprolol alone.  #. Paroxysmal AFib: Again noted in 3/2021 during acute illness (CHADSVASC: 1, sex). it also occurred after lung tx surgery in 2018.   #. Frequent runs of SVT - noted on Ziopatch (1.6%).    -- Continue Metoprolol per EP recs.    #. Elevated Alk phos: Since 2019. W/u in the past.    #. Thrombocytopenia: Unclear etiology. Intermittent. Will monitor for now. If it continues to drop might benefit from Hematology input.    # Health Care Maintenance: She has had C19 vaccination.  - Due for DEXA this year (2021).    RTC in 12 weeks with Annuals (She just did a 6MWT few weeks ago - hence will not repeat it). Obtain Chest CT in  8/2021.      Kecia is a 60 year old who is being evaluated via a billable video visit.      How would you like to obtain your AVS? MyChart  If the video visit is dropped, the invitation should be resent by: Text to cell phone: [phone]  Will anyone else be joining your video visit? No      Video Start Time: 2:38 PM  Video-Visit Details    Type of service:  Video Visit    Video End Time:2:57 PM    Originating Location (pt. Location): Other Dialysis center    Distant Location (provider location):  [facility]     Platform used for Video Visit: Shaheen FARAH CMA      Again, thank you for allowing me to participate in the care of your patient.        Sincerely,        Tarik Christensen MD

## 2021-06-10 NOTE — PROGRESS NOTES
Kecia is a 60 year old who is being evaluated via a billable video visit.      How would you like to obtain your AVS? MyChart  If the video visit is dropped, the invitation should be resent by: Text to cell phone: [phone]  Will anyone else be joining your video visit? No      Video Start Time: 2:38 PM  Video-Visit Details    Type of service:  Video Visit    Video End Time:2:57 PM    Originating Location (pt. Location): Other Dialysis center    Distant Location (provider location):  [facility]     Platform used for Video Visit: Shaheen FARAH CMA

## 2021-06-11 ENCOUNTER — ANESTHESIA (OUTPATIENT)
Dept: SURGERY | Facility: CLINIC | Age: 61
End: 2021-06-11
Payer: COMMERCIAL

## 2021-06-11 ENCOUNTER — HOSPITAL ENCOUNTER (OUTPATIENT)
Facility: CLINIC | Age: 61
Discharge: HOME | End: 2021-06-11
Attending: INTERNAL MEDICINE | Admitting: INTERNAL MEDICINE
Payer: COMMERCIAL

## 2021-06-11 ENCOUNTER — ANCILLARY PROCEDURE (OUTPATIENT)
Dept: GENERAL RADIOLOGY | Facility: CLINIC | Age: 61
End: 2021-06-11
Attending: INTERNAL MEDICINE
Payer: COMMERCIAL

## 2021-06-11 ENCOUNTER — TELEPHONE (OUTPATIENT)
Dept: TRANSPLANT | Facility: CLINIC | Age: 61
End: 2021-06-11

## 2021-06-11 ENCOUNTER — ANESTHESIA EVENT (OUTPATIENT)
Dept: SURGERY | Facility: CLINIC | Age: 61
End: 2021-06-11
Payer: COMMERCIAL

## 2021-06-11 VITALS
SYSTOLIC BLOOD PRESSURE: 150 MMHG | RESPIRATION RATE: 16 BRPM | HEART RATE: 92 BPM | TEMPERATURE: 97.7 F | BODY MASS INDEX: 19.77 KG/M2 | DIASTOLIC BLOOD PRESSURE: 77 MMHG | OXYGEN SATURATION: 95 % | HEIGHT: 63 IN | WEIGHT: 111.55 LBS

## 2021-06-11 DIAGNOSIS — Z79.899 ENCOUNTER FOR LONG-TERM (CURRENT) USE OF HIGH-RISK MEDICATION: ICD-10-CM

## 2021-06-11 DIAGNOSIS — J98.09 BRONCHIAL STENOSIS, RIGHT: ICD-10-CM

## 2021-06-11 DIAGNOSIS — Z94.2 S/P LUNG TRANSPLANT (H): ICD-10-CM

## 2021-06-11 DIAGNOSIS — Z94.2 LUNG REPLACED BY TRANSPLANT (H): ICD-10-CM

## 2021-06-11 DIAGNOSIS — J98.09 BRONCHIAL STENOSIS, RIGHT: Primary | ICD-10-CM

## 2021-06-11 DIAGNOSIS — Z94.2 S/P LUNG TRANSPLANT (H): Primary | ICD-10-CM

## 2021-06-11 LAB — GLUCOSE BLDC GLUCOMTR-MCNC: 75 MG/DL (ref 70–99)

## 2021-06-11 PROCEDURE — 94375 RESPIRATORY FLOW VOLUME LOOP: CPT | Performed by: INTERNAL MEDICINE

## 2021-06-11 PROCEDURE — 710N000010 HC RECOVERY PHASE 1, LEVEL 2, PER MIN: Performed by: INTERNAL MEDICINE

## 2021-06-11 PROCEDURE — 250N000011 HC RX IP 250 OP 636: Performed by: INTERNAL MEDICINE

## 2021-06-11 PROCEDURE — 258N000003 HC RX IP 258 OP 636: Performed by: NURSE ANESTHETIST, CERTIFIED REGISTERED

## 2021-06-11 PROCEDURE — 370N000017 HC ANESTHESIA TECHNICAL FEE, PER MIN: Performed by: INTERNAL MEDICINE

## 2021-06-11 PROCEDURE — 250N000009 HC RX 250: Performed by: NURSE ANESTHETIST, CERTIFIED REGISTERED

## 2021-06-11 PROCEDURE — 360N000077 HC SURGERY LEVEL 4, PER MIN: Performed by: INTERNAL MEDICINE

## 2021-06-11 PROCEDURE — 94729 DIFFUSING CAPACITY: CPT | Performed by: INTERNAL MEDICINE

## 2021-06-11 PROCEDURE — 999N000141 HC STATISTIC PRE-PROCEDURE NURSING ASSESSMENT: Performed by: INTERNAL MEDICINE

## 2021-06-11 PROCEDURE — 250N000011 HC RX IP 250 OP 636: Performed by: NURSE ANESTHETIST, CERTIFIED REGISTERED

## 2021-06-11 PROCEDURE — 710N000012 HC RECOVERY PHASE 2, PER MINUTE: Performed by: INTERNAL MEDICINE

## 2021-06-11 PROCEDURE — 31630 BRONCHOSCOPY DILATE/FX REPR: CPT | Mod: GC | Performed by: INTERNAL MEDICINE

## 2021-06-11 PROCEDURE — 82962 GLUCOSE BLOOD TEST: CPT

## 2021-06-11 PROCEDURE — 71046 X-RAY EXAM CHEST 2 VIEWS: CPT | Mod: GC | Performed by: RADIOLOGY

## 2021-06-11 PROCEDURE — 272N000001 HC OR GENERAL SUPPLY STERILE: Performed by: INTERNAL MEDICINE

## 2021-06-11 PROCEDURE — 31641 BRONCHOSCOPY TREAT BLOCKAGE: CPT | Mod: GC | Performed by: INTERNAL MEDICINE

## 2021-06-11 PROCEDURE — 258N000003 HC RX IP 258 OP 636: Performed by: INTERNAL MEDICINE

## 2021-06-11 PROCEDURE — C1726 CATH, BAL DIL, NON-VASCULAR: HCPCS | Performed by: INTERNAL MEDICINE

## 2021-06-11 RX ORDER — LIDOCAINE 40 MG/G
CREAM TOPICAL
Status: DISCONTINUED | OUTPATIENT
Start: 2021-06-11 | End: 2021-06-11 | Stop reason: HOSPADM

## 2021-06-11 RX ORDER — NALOXONE HYDROCHLORIDE 0.4 MG/ML
0.4 INJECTION, SOLUTION INTRAMUSCULAR; INTRAVENOUS; SUBCUTANEOUS
Status: DISCONTINUED | OUTPATIENT
Start: 2021-06-11 | End: 2021-06-11 | Stop reason: HOSPADM

## 2021-06-11 RX ORDER — NALOXONE HYDROCHLORIDE 0.4 MG/ML
0.2 INJECTION, SOLUTION INTRAMUSCULAR; INTRAVENOUS; SUBCUTANEOUS
Status: DISCONTINUED | OUTPATIENT
Start: 2021-06-11 | End: 2021-06-11 | Stop reason: HOSPADM

## 2021-06-11 RX ORDER — ONDANSETRON 2 MG/ML
INJECTION INTRAMUSCULAR; INTRAVENOUS PRN
Status: DISCONTINUED | OUTPATIENT
Start: 2021-06-11 | End: 2021-06-11

## 2021-06-11 RX ORDER — PROPOFOL 10 MG/ML
INJECTION, EMULSION INTRAVENOUS PRN
Status: DISCONTINUED | OUTPATIENT
Start: 2021-06-11 | End: 2021-06-11

## 2021-06-11 RX ORDER — PROPOFOL 10 MG/ML
INJECTION, EMULSION INTRAVENOUS CONTINUOUS PRN
Status: DISCONTINUED | OUTPATIENT
Start: 2021-06-11 | End: 2021-06-11

## 2021-06-11 RX ORDER — FENTANYL CITRATE 50 UG/ML
INJECTION, SOLUTION INTRAMUSCULAR; INTRAVENOUS PRN
Status: DISCONTINUED | OUTPATIENT
Start: 2021-06-11 | End: 2021-06-11

## 2021-06-11 RX ORDER — ONDANSETRON 2 MG/ML
4 INJECTION INTRAMUSCULAR; INTRAVENOUS EVERY 30 MIN PRN
Status: DISCONTINUED | OUTPATIENT
Start: 2021-06-11 | End: 2021-06-11 | Stop reason: HOSPADM

## 2021-06-11 RX ORDER — TACROLIMUS 0.5 MG/1
0.5 CAPSULE ORAL 2 TIMES DAILY
Qty: 60 CAPSULE | Refills: 11 | Status: SHIPPED | OUTPATIENT
Start: 2021-06-11 | End: 2021-07-07

## 2021-06-11 RX ORDER — ONDANSETRON 4 MG/1
4 TABLET, ORALLY DISINTEGRATING ORAL EVERY 30 MIN PRN
Status: DISCONTINUED | OUTPATIENT
Start: 2021-06-11 | End: 2021-06-11 | Stop reason: HOSPADM

## 2021-06-11 RX ORDER — SODIUM CHLORIDE 9 MG/ML
INJECTION, SOLUTION INTRAVENOUS CONTINUOUS PRN
Status: DISCONTINUED | OUTPATIENT
Start: 2021-06-11 | End: 2021-06-11

## 2021-06-11 RX ORDER — LIDOCAINE HYDROCHLORIDE 20 MG/ML
INJECTION, SOLUTION INFILTRATION; PERINEURAL PRN
Status: DISCONTINUED | OUTPATIENT
Start: 2021-06-11 | End: 2021-06-11

## 2021-06-11 RX ORDER — DEXAMETHASONE SODIUM PHOSPHATE 4 MG/ML
INJECTION, SOLUTION INTRA-ARTICULAR; INTRALESIONAL; INTRAMUSCULAR; INTRAVENOUS; SOFT TISSUE PRN
Status: DISCONTINUED | OUTPATIENT
Start: 2021-06-11 | End: 2021-06-11

## 2021-06-11 RX ADMIN — ONDANSETRON 4 MG: 2 INJECTION INTRAMUSCULAR; INTRAVENOUS at 13:54

## 2021-06-11 RX ADMIN — ROCURONIUM BROMIDE 20 MG: 10 INJECTION INTRAVENOUS at 13:43

## 2021-06-11 RX ADMIN — SUGAMMADEX 100 MG: 100 INJECTION, SOLUTION INTRAVENOUS at 14:04

## 2021-06-11 RX ADMIN — SODIUM CHLORIDE: 9 INJECTION, SOLUTION INTRAVENOUS at 13:28

## 2021-06-11 RX ADMIN — PROPOFOL 150 MCG/KG/MIN: 10 INJECTION, EMULSION INTRAVENOUS at 13:43

## 2021-06-11 RX ADMIN — LIDOCAINE HYDROCHLORIDE 100 MG: 20 INJECTION, SOLUTION INFILTRATION; PERINEURAL at 13:43

## 2021-06-11 RX ADMIN — PROPOFOL 130 MG: 10 INJECTION, EMULSION INTRAVENOUS at 13:43

## 2021-06-11 RX ADMIN — DEXAMETHASONE SODIUM PHOSPHATE 10 MG: 4 INJECTION, SOLUTION INTRA-ARTICULAR; INTRALESIONAL; INTRAMUSCULAR; INTRAVENOUS; SOFT TISSUE at 13:48

## 2021-06-11 RX ADMIN — ROCURONIUM BROMIDE 20 MG: 10 INJECTION INTRAVENOUS at 13:45

## 2021-06-11 RX ADMIN — FENTANYL CITRATE 50 MCG: 50 INJECTION, SOLUTION INTRAMUSCULAR; INTRAVENOUS at 13:43

## 2021-06-11 RX ADMIN — PROPOFOL 20 MG: 10 INJECTION, EMULSION INTRAVENOUS at 13:45

## 2021-06-11 ASSESSMENT — MIFFLIN-ST. JEOR: SCORE: 1055.13

## 2021-06-11 NOTE — DISCHARGE INSTRUCTIONS
Post-Bronchoscopy Patient Instructions:    June 11, 2021  Kecia R Mirza      Your procedure (airway dilation, tissue debulking) was completed without any immediate complications.      You may cough up scant amount of blood for the next 12-24 hours. If you have excessive cough with blood, chest pain, shortness of breath or other concerning symptoms, please report to the closest emergency room.      You may experience low grade (less than 100.5 F) fever next 24 hours for which you can take Tylenol. If the fever persists more than 24 hours contact our office or your primary care provider.      You  resume your regular diet as it was prior to procedure.      Should you have any question, please do not hesitate to call our office.    We will plan to repeat your bronchoscopy in 3 months  [facility]  Same-Day Surgery   Adult Discharge Orders & Instructions     For 24 hours after surgery    1. Get plenty of rest.  A responsible adult must stay with you for at least 24 hours after you leave the hospital.   2. Do not drive or use heavy equipment.  If you have weakness or tingling, don't drive or use heavy equipment until this feeling goes away.  3. Do not drink alcohol.  4. Avoid strenuous or risky activities.  Ask for help when climbing stairs.   5. You may feel lightheaded.  IF so, sit for a few minutes before standing.  Have someone help you get up.   6. If you have nausea (feel sick to your stomach): Drink only clear liquids such as apple juice, ginger ale, broth or 7-Up.  Rest may also help.  Be sure to drink enough fluids.  Move to a regular diet as you feel able.  7. You may have a slight fever. Call the doctor if your fever is over 100 F (37.7 C) (taken under the tongue) or lasts longer than 24 hours.  8. You may have a dry mouth, a sore throat, muscle aches or trouble sleeping.  These should go away after 24 hours.  9. Do not make important or legal decisions.   Call your  doctor for any of the followin.  Signs of infection (fever, growing tenderness at the surgery site, a large amount of drainage or bleeding, severe pain, foul-smelling drainage, redness, swelling).    2. It has been over 8 to 10 hours since surgery and you are still not able to urinate (pass water).    3.  Headache for over 24 hours.    4.  Numbness, tingling or weakness the day after surgery (if you had spinal anesthesia).  To contact a doctor, call ___________[facility] [phone] Clinic Monday - Friday Business Hours________________ or:        [phone] and ask for the resident on call for   __________Pulmonology____________________ (answered 24 hours a day)      Emergency Department:    [facility]: [phone]       (TTY for hearing impaired: [phone])    [facility]: [phone]       (TTY for hearing impaired: [phone])

## 2021-06-11 NOTE — PROCEDURES
INTERVENTIONAL PULMONOLOGY       Procedure(s):    A flexible and rigid bronchoscopy   Airway exam  Balloon bronchoplasty without stent placement (1 sites)   Therapeutic suctioning (1 sites)  Tissue debulking     Indication:  Hx of bilateral lung transplant with right main stenosis    Attending of Record:  Mati Norris MD     Interventional Pulmonary Fellow   Juana Baugh MD     Trainees Present:   None     Medications:    General Anesthesia - See anesthesia flowsheet for details    Sedation Time:   Per Anesthesia Care Provider    Time Out:  Performed    The patient's medical record has been reviewed.  The indication for the procedure was reviewed.  The necessary history and physical examination was performed and reviewed.  The risks, benefits and alternatives of the procedure were discussed with the the patient in detail and she had the opportunity to ask questions. Verbal and written informed consent was obtained.  The proposed procedure and the patient's identification were verified prior to the procedure by the physician and the surgical team.    The patient was assessed for the adequacy for the procedure and to receive medications.   Mental Status: Alert and oriented  Airway examination:  Class II (Complete visualization of uvula)  Pulmonary:  Clear to ausculation bilaterally  CV:  RRR  ASA Grade:  (II)  Mild systemic disease    After clinical evaluation and reviewing the indication, risks, alternatives and benefits of the procedure the patient was deemed to be in satisfactory condition to undergo the procedure.      A Tuberculosis risk assessment was performed:  The patient has no known RISK of Tuberculosis    The procedure was performed in a negative airflow room: The patient could not be moved to a negative airflow room because of needed OR for the procedure    Maneuvers / Procedure:      A Flexible and 12mm Rigid bronchoscope bronchoscope was used for the procedure. The rigid bronchoscope was inserted  into the mouth. Uvula, epiglottis and vocal cords were seen. The scope was advanced turning the bevel to 90degress while passing through the cords and into the trachea.     Airway Examination: A complete airway examination was performed from the distal trachea to the subsegmental level in each lobe of both lungs.  Pertinent findings include: Right mainstem with moderate stenosis. RUL was open. Granulation tissue was seen covering the BI. Left mainstem with minor stenosis.    Tissue debulkin.9mm cryoprobe was used to remove granulation tissue covering the BI. After removal, the BI and distal RML/RLL could be visualized.    Airway dilation: Airway dilation#1: The 12-13.5-15mm Elation Balloon was used to dilate the Bronchus intermedius  Airway. Final dilation diameter reached was 12mm      Therapeutic suctioning: 15-20min of operative time was spent clearing out the airway of debris, blood and mucous.    Any disposable equipment was visually inspected and deemed to be intact immediately post procedure.      Relevant Pictures  RM anastomosis      LM anastomosis      Granulation tissue covering BI      RUL and BI after tissue debulking and balloon dilation      RML and RLL        Recommendations:     --Successful tissue debulking and airway dilation of BI  --Given accumulation of granulation tissue after laser use from prior bronchoscopy, would not recommend further laser use.  --Repeat bronchoscopy in 3 months.    Mati Norris MD, MHA    of Medicine  Interventional Pulmonary  Department of Pulmonary, Allergy, Critical Care and Sleep Medicine   [facility]  Pager: [phone]   Office: [phone]  Email: [email or web address]    Krystle CHAVEZ, OCN  Clinical Nurse Specialist  Department of Thoracic Surgery  Office: [phone]  Email: [email or web address]    Chloe Avila  Interventional Pulmonology Surgery Scheduler  Office: [phone]  Email:  [email or web address]

## 2021-06-11 NOTE — ANESTHESIA PREPROCEDURE EVALUATION
Anesthesia Pre-Procedure Evaluation    Patient: Kecia Blue   MRN: [id number] : 1960        Preoperative Diagnosis: Bronchial stenosis, right [J98.09]   Procedure : Procedure(s):  BRONCHOSCOPY, Flexible and Rigid Bronchoscopy, Tissue Debulking with CO2 Laser Assistance, airway dilation, possible stent placement     Past Medical History:   Diagnosis Date     Acute on chronic respiratory failure with hypoxia (H) 2018     Anisocoria      Antisynthetase syndrome (H)      Anxiety      Aspergillus (H)      Aspergillus pneumonia (H) 2020     Benign essential hypertension      C. difficile colitis      Cytomegalovirus (CMV) viremia (H)      Dermatomyositis (H)      Dysplasia of cervix, low grade (ESTRADA 1)      EBV (Franklin-Barr virus) viremia      ESRD (end stage renal disease) on dialysis (H)      ILD (interstitial lung disease) (H)      Lung replaced by transplant (H)      Osteopenia      Paroxysmal atrial fibrillation (H)      Pneumocystis jiroveci pneumonia (H)      PONV (postoperative nausea and vomiting)      Post-menopause      Pulmonary hypertension (H)      Raynaud's disease      Seronegative rheumatoid arthritis (H)       Past Surgical History:   Procedure Laterality Date     BRONCHOSCOPY (RIGID OR FLEXIBLE), DIAGNOSTIC N/A 4/10/2018    Procedure: COMBINED BRONCHOSCOPY (RIGID OR FLEXIBLE), LAVAGE;;  Surgeon: Mariposa Donohue MD;  Location: [facility]     BRONCHOSCOPY (RIGID OR FLEXIBLE), DIAGNOSTIC N/A 2020    Procedure: BRONCHOSCOPY, WITH BRONCHOALVEOLAR LAVAGE;  Surgeon: Uri Bass MD;  Location: [facility]     BRONCHOSCOPY (RIGID OR FLEXIBLE), DILATE BRONCHUS / TRACHEA N/A 10/11/2018    Procedure: BRONCHOSCOPY (RIGID OR FLEXIBLE), DILATE BRONCHUS / TRACHEA;  Flexible And Rigid Bronchoscopy and Dilation;  Surgeon: Wilber Lin MD;  [facility]     BRONCHOSCOPY FLEXIBLE N/A 3/13/2018    Procedure: BRONCHOSCOPY FLEXIBLE;  Flexible Bronchoscopy ;  Surgeon: Daniel  Gissell Smith MD;  Location: [facility]     BRONCHOSCOPY FLEXIBLE N/A 5/9/2018    Procedure: BRONCHOSCOPY FLEXIBLE;;  Surgeon: Wilber Lin MD;  [facility]: [facility]     BRONCHOSCOPY FLEXIBLE AND RIGID N/A 9/10/2018    Procedure: BRONCHOSCOPY FLEXIBLE AND RIGID;  Flexible and Rigid Bronchoscopy with Balloon Dilation, tissue debulking with cryo, and Right mainstem bronchus stent placement;  Surgeon: Wilber Lin MD;  [facility]     BRONCHOSCOPY RIGID N/A 6/6/2018    Procedure: BRONCHOSCOPY RIGID;;  Surgeon: Lopez Macias MD;  [facility]: [facility]     BRONCHOSCOPY, DILATE BRONCHUS, STENT BRONCHUS, COMBINED N/A 6/11/2018    Procedure: COMBINED BRONCHOSCOPY, DILATE BRONCHUS, STENT BRONCHUS;  Flexible Bronchoscopy, Balloon Dilation, Bronchial Washings;  Surgeon: Wilber Lin MD;  [facility]     BRONCHOSCOPY, DILATE BRONCHUS, STENT BRONCHUS, COMBINED Right 7/10/2018    Procedure: COMBINED BRONCHOSCOPY, DILATE BRONCHUS, STENT BRONCHUS;  Flexible Bronchoscopy, Balloon Dilation, Bronchial Washings  ;  Surgeon: Wilebr Lin MD;  [facility]     BRONCHOSCOPY, DILATE BRONCHUS, STENT BRONCHUS, COMBINED N/A 8/2/2018    Procedure: COMBINED BRONCHOSCOPY, DILATE BRONCHUS, STENT BRONCHUS;  Flexible Bronchoscopy, Bronchial Washings, Balloon Dilation;  Surgeon: Wilber Lin MD;  [facility]     BRONCHOSCOPY, DILATE BRONCHUS, STENT BRONCHUS, COMBINED N/A 8/20/2018    Procedure: COMBINED BRONCHOSCOPY, DILATE BRONCHUS, STENT BRONCHUS;  Flexible Bronchoscopy, Balloon Dilation;  Surgeon: Wilber Lin MD;  [facility]     BRONCHOSCOPY, DILATE BRONCHUS, STENT BRONCHUS, COMBINED N/A 10/29/2018    Procedure: Flexible Bronchoscopy, Balloon Dilation, Stent Revision, Airway Examination And Therapeutic Suctioning, Cyro Tumor Debulking;  Surgeon: Wilber Lin MD;  [facility]     BRONCHOSCOPY, DILATE BRONCHUS, STENT BRONCHUS, COMBINED N/A 11/20/2018     Procedure: Rigid Bronchoscopy, Stent Removal and dilitation;  Surgeon: Wilber Lin MD;  [facility]     BRONCHOSCOPY, DILATE BRONCHUS, STENT BRONCHUS, COMBINED N/A 12/14/2018    Procedure: Flexible And Rigid Bronchoscopy, Balloon Dilation, Bronchial Washing;  Surgeon: Wilber Lin MD;  [facility]     BRONCHOSCOPY, DILATE BRONCHUS, STENT BRONCHUS, COMBINED N/A 1/17/2019    Procedure: Flexible And Rigid Bronchoscopy, Balloon Dilation.;  Surgeon: Wilber Lin MD;  [facility]     BRONCHOSCOPY, DILATE BRONCHUS, STENT BRONCHUS, COMBINED N/A 3/7/2019    Procedure: Flexible and Rigid Bronchoscopy, Bronchial Washing, Balloon Dilation;  Surgeon: Wilber Lin MD;  [facility]     BRONCHOSCOPY, DILATE BRONCHUS, STENT BRONCHUS, COMBINED N/A 6/6/2019    Procedure: Rigid and Flexible Bronchoscopy, Balloon Dilation;  Surgeon: Wilber Lin MD;  [facility]     BRONCHOSCOPY, DILATE BRONCHUS, STENT BRONCHUS, COMBINED N/A 10/11/2019    Procedure: Flexible and Rigid Bronchoscopy, Balloon Dilation, Bronchoalveolar Lagave;  Surgeon: Wilber Lin MD;  [facility]     BRONCHOSCOPY, DILATE BRONCHUS, STENT BRONCHUS, COMBINED N/A 2/19/2021    Procedure: BRONCHOSCOPY, flexible, airway dilation, and bronchial wash;  Surgeon: Wilber Lin MD;  [facility]     BRONCHOSCOPY, DILATE BRONCHUS, STENT BRONCHUS, COMBINED N/A 4/9/2021    Procedure: BRONCHOSCOPY, flexible and rigid, Airway suctioning;  Surgeon: Mati Norris MD;  [facility]     CV RIGHT HEART CATH MEASUREMENTS RECORDED N/A 3/10/2020    Procedure: CV RIGHT HEART CATH;  Surgeon: Wai Garcia MD;  Location:  [facility] CATH [facility]     ENT SURGERY      tonsillectomy as a child     ESOPHAGOSCOPY, GASTROSCOPY, DUODENOSCOPY (EGD), COMBINED N/A 10/29/2018    Procedure: COMBINED ESOPHAGOSCOPY, GASTROSCOPY, DUODENOSCOPY (EGD) with biopsies and polypectomy;  Surgeon: Chente Bloom  MD Maninder;  [facility]     INSERT EXTRACORPORAL MEMBRANE OXYGENATOR ADULT (OUTSIDE OR) N/A 2/27/2018    Procedure: INSERT EXTRACORPORAL MEMBRANE OXYGENATOR ADULT (OUTSIDE OR);  INSERT EXTRACORPORAL MEMBRANE OXYGENATOR ADULT (OUTSIDE OR) ;  Surgeon: Toby Hernandez MD;  [facility]     IR CVC TUNNEL PLACEMENT > 5 YRS OF AGE  10/25/2019     IR DIALYSIS FISTULOGRAM LEFT  3/2/2021     IR DIALYSIS MECH THROMB, PTA  3/2/2021     IR FLUORO 0-1 HOUR  5/7/2021     IR GASTRO JEJUNOSTOMY TUBE PLACEMENT  2/16/2021     IR PARACENTESIS  1/8/2020     IR THORACENTESIS  9/13/2019     IR TRANSCATHETER BIOPSY  1/8/2020     LASER CO2 BRONCHOSCOPY N/A 4/30/2021    Procedure: Flexible and Rigid Bronchoscopy and Tissue Debulking with CO2 Laser Assistance;  Surgeon: Mati Norris MD;  [facility]     no prior surgery       REMOVE EXTRACORPORAL MEMBRANE OXYGENATOR ADULT N/A 3/3/2018    Procedure: REMOVE EXTRACORPORAL MEMBRANE OXYGENATOR ADULT;  Removal of Right Femoral Venous and Right Axillary Arterial Extracorporeal Membrane Oxygenator;  Surgeon: Toby Hernandez MD;  [facility]     TRANSPLANT LUNG RECIPIENT SINGLE X2 Bilateral 3/1/2018    Procedure: TRANSPLANT LUNG RECIPIENT SINGLE X2;  Median Sternotomy, Extracorporeal Membrane Oxygenator, bilateral sequential lung transplant;  Surgeon: Toby Hernandez MD;  [facility]      No Known Allergies   Social History     Tobacco Use     Smoking status: Never Smoker     Smokeless tobacco: Never Used   Substance Use Topics     Alcohol use: No     Alcohol/week: 1.0 standard drinks     Types: 1 Glasses of wine per week      Wt Readings from Last 1 Encounters:   06/11/21 50.6 kg (111 lb 8.8 oz)        Anesthesia Evaluation   Pt has had prior anesthetic. Type: MAC and General.    History of anesthetic complications  - PONV.      ROS/MED HX  ENT/Pulmonary: Comment: Pt s/p bilateral lung transplant for interstitial lung disease, R bronchial  mainstem stenosis requiring dilation/stent, severe restriction on PFTs       Neurologic:       Cardiovascular: Comment: H/o DVT, paroxysmal Afib, SVT, now SR, pulm HTN, EF 60-65%, no CP, palpitations    (+) hypertension-----pulmonary hypertension,     METS/Exercise Tolerance:     Hematologic:       Musculoskeletal: Comment: Raynaud's, RA, antisynthetase syndrome      GI/Hepatic:       Renal/Genitourinary:     (+) renal disease, type: ESRD, Pt requires dialysis, type: Hemodialysis,     Endo:       Psychiatric/Substance Use:     (+) psychiatric history anxiety     Infectious Disease:       Malignancy:       Other:            Physical Exam    Airway        Mallampati: II   TM distance: > 3 FB   Neck ROM: full   Mouth opening: > 3 cm    Respiratory Devices and Support         Dental  no notable dental history         Cardiovascular   cardiovascular exam normal          Pulmonary   pulmonary exam normal                OUTSIDE LABS:  CBC:   Lab Results   Component Value Date    WBC 7.5 05/02/2021    WBC 6.8 04/30/2021    HGB 10.6 (L) 05/02/2021    HGB 10.9 (L) 04/30/2021    HCT 33.8 (L) 05/02/2021    HCT 34.4 (L) 04/30/2021     05/02/2021     04/30/2021     BMP:   Lab Results   Component Value Date     05/07/2021     05/05/2021    POTASSIUM 4.3 05/07/2021    POTASSIUM 4.5 05/05/2021    CHLORIDE 102 05/07/2021    CHLORIDE 103 05/05/2021    CO2 23 05/07/2021    CO2 25 05/05/2021    BUN 66 (H) 05/07/2021    BUN 53 (H) 05/05/2021    CR 3.90 (H) 05/07/2021    CR 3.62 (H) 05/05/2021     (H) 05/07/2021     (H) 05/05/2021     COAGS:   Lab Results   Component Value Date    PTT 28 02/12/2021    INR 0.99 04/29/2021    FIBR 358 02/11/2021     POC:   Lab Results   Component Value Date    BGM 75 06/11/2021    HCG Negative 06/06/2019     HEPATIC:   Lab Results   Component Value Date    ALBUMIN 3.5 03/29/2021    PROTTOTAL 7.2 05/01/2021    ALT 36 03/29/2021    AST 19 03/29/2021     (H)  11/11/2019    ALKPHOS 288 03/29/2021    BILITOTAL 0.9 03/29/2021    SALAS <10 (L) 01/24/2021     OTHER:   Lab Results   Component Value Date    PH 7.41 02/10/2021    LACT 0.5 (L) 02/09/2021    A1C 6.0 (H) 01/24/2021    CHELSEY 8.1 (L) 05/07/2021    PHOS 5.9 (H) 05/03/2021    MAG 1.7 04/30/2021    LIPASE 212 10/24/2019    AMYLASE 58 02/19/2018    TSH 1.80 08/01/2018    CRP 25.0 (H) 10/06/2019    SED 93 (H) 10/07/2019       Anesthesia Plan    ASA Status:  3   NPO Status:  NPO Appropriate    Anesthesia Type: General.     - Airway: ETT   Induction: Intravenous.   Maintenance: Balanced.   Techniques and Equipment:     - Airway: Jet ventilation         Consents    Anesthesia Plan(s) and associated risks, benefits, and realistic alternatives discussed. Questions answered and patient/representative(s) expressed understanding.     - Discussed with:  Patient         Postoperative Care       PONV prophylaxis: Ondansetron (or other 5HT-3), Dexamethasone or Solumedrol     Comments:                SHELBY ONOFRE MD

## 2021-06-11 NOTE — TELEPHONE ENCOUNTER
Tacrolimus level 11.2 at 12 hours, on 6/9/21.  Goal 8-10.   Current dose 0.5 mg in AM, 1 mg in PM    Dose changed to 0.5 mg in AM, 0.5 mg in PM   Recheck level in 7-14 days.     LM with patient.    TimePad message sent

## 2021-06-11 NOTE — OR NURSING
Dr. Hadley was called for a sign out  Which  Stated he would. aware of high blood pressure issues, patient has not taken her meds today Dr. Haldey stated she should take meds when she leaves hospital.

## 2021-06-11 NOTE — ANESTHESIA CARE TRANSFER NOTE
Patient: Kecia Blue    Procedure(s):  BRONCHOSCOPY, Flexible and Rigid Bronchoscopy, Tissue Debulking with cryo Assistance, airway dilation,    Diagnosis: Bronchial stenosis, right [J98.09]  Diagnosis Additional Information: No value filed.    Anesthesia Type:   General     Note:    Oropharynx: oropharynx clear of all foreign objects and spontaneously breathing    Oxygen Supplementation: nasal cannula  Level of Supplemental Oxygen (L/min / FiO2): 3  Independent Airway: airway patency satisfactory and stable  Dentition: dentition unchanged  Vital Signs Stable: post-procedure vital signs reviewed and stable  Report to RN Given: handoff report given  Patient transferred to: PACU    Handoff Report: Identifed the Patient, Identified the Reponsible Provider, Reviewed the pertinent medical history, Discussed the surgical course, Reviewed Intra-OP anesthesia mangement and issues during anesthesia, Set expectations for post-procedure period and Allowed opportunity for questions and acknowledgement of understanding      Vitals: (Last set prior to Anesthesia Care Transfer)  CRNA VITALS  6/11/2021 1347 - 6/11/2021 1420      6/11/2021             NIBP:  140/68    Pulse:  70    NIBP Mean:  86    SpO2:  93 %        Electronically Signed By: ADRIÁN Alaniz CRNA  June 11, 2021  2:20 PM

## 2021-06-14 DIAGNOSIS — Z94.2 S/P LUNG TRANSPLANT (H): ICD-10-CM

## 2021-06-14 LAB
DLCOUNC-%PRED-PRE: 54 %
DLCOUNC-PRE: 11.18 ML/MIN/MMHG
DLCOUNC-PRED: 20.39 ML/MIN/MMHG
ERV-%PRED-PRE: 29 %
ERV-PRE: 0.34 L
ERV-PRED: 1.14 L
EXPTIME-PRE: 5.31 SEC
FEF2575-%PRED-PRE: 45 %
FEF2575-PRE: 1.09 L/SEC
FEF2575-PRED: 2.36 L/SEC
FEFMAX-%PRED-PRE: 51 %
FEFMAX-PRE: 3.31 L/SEC
FEFMAX-PRED: 6.4 L/SEC
FEV1-%PRED-PRE: 45 %
FEV1-PRE: 1.16 L
FEV1FEV6-PRE: 84 %
FEV1FEV6-PRED: 81 %
FEV1FVC-PRE: 84 %
FEV1FVC-PRED: 80 %
FEV1SVC-PRE: 97 %
FEV1SVC-PRED: 77 %
FIFMAX-PRE: 3.2 L/SEC
FVC-%PRED-PRE: 42 %
FVC-PRE: 1.38 L
FVC-PRED: 3.23 L
IC-%PRED-PRE: 39 %
IC-PRE: 0.86 L
IC-PRED: 2.17 L
VA-%PRED-PRE: 44 %
VA-PRE: 2.16 L
VC-%PRED-PRE: 36 %
VC-PRE: 1.2 L
VC-PRED: 3.31 L

## 2021-06-14 RX ORDER — BUDESONIDE 0.5 MG/2ML
0.5 INHALANT ORAL 2 TIMES DAILY
Qty: 60 ML | Refills: 11 | Status: SHIPPED | OUTPATIENT
Start: 2021-06-14 | End: 2022-02-10

## 2021-06-14 NOTE — ANESTHESIA POSTPROCEDURE EVALUATION
Patient: Kecia Blue    Procedure(s):  BRONCHOSCOPY, Flexible and Rigid Bronchoscopy, Tissue Debulking with cryo Assistance, airway dilation,    Diagnosis:Bronchial stenosis, right [J98.09]  Diagnosis Additional Information: No value filed.    Anesthesia Type:  General    Note:  Disposition: Outpatient   Postop Pain Control: Uneventful            Sign Out: Well controlled pain   PONV: No   Neuro/Psych: Uneventful            Sign Out: Acceptable/Baseline neuro status   Airway/Respiratory: Uneventful            Sign Out: Acceptable/Baseline resp. status   CV/Hemodynamics: Uneventful            Sign Out: Acceptable CV status; No obvious hypovolemia; No obvious fluid overload   Other NRE: NONE   DID A NON-ROUTINE EVENT OCCUR? No           Last vitals:  Vitals:    06/11/21 1530 06/11/21 1545 06/11/21 1600   BP: (!) 174/74 (!) 170/67 (!) 150/77   Pulse: 71 71 92   Resp:   16   Temp:   36.5  C (97.7  F)   SpO2: 100% 97% 95%       Last vitals prior to Anesthesia Care Transfer:  CRNA VITALS  6/11/2021 1347 - 6/11/2021 1447      6/11/2021             NIBP:  140/68    Pulse:  70    NIBP Mean:  86    SpO2:  93 %          Electronically Signed By: SHELBY ONOFRE MD  June 13, 2021  9:10 PM

## 2021-06-15 DIAGNOSIS — R79.89 ELEVATED LIVER FUNCTION TESTS: Primary | ICD-10-CM

## 2021-06-22 ENCOUNTER — VIRTUAL VISIT (OUTPATIENT)
Dept: GASTROENTEROLOGY | Facility: CLINIC | Age: 61
End: 2021-06-22
Attending: INTERNAL MEDICINE
Payer: COMMERCIAL

## 2021-06-22 DIAGNOSIS — R79.89 ELEVATED LIVER FUNCTION TESTS: Primary | ICD-10-CM

## 2021-06-22 PROCEDURE — 99214 OFFICE O/P EST MOD 30 MIN: CPT | Mod: 95 | Performed by: INTERNAL MEDICINE

## 2021-06-22 ASSESSMENT — PAIN SCALES - GENERAL: PAINLEVEL: NO PAIN (0)

## 2021-06-22 NOTE — LETTER
6/22/2021         RE: Kecia Blue  [address] [place] [address]        Dear Colleague,    Thank you for referring your patient, Kecia Blue, to the [facility] [place]. Please see a copy of my visit note below.    Kecia is a 60 year old who is being evaluated via a billable video visit.      How would you like to obtain your AVS? Mail a copy  If the video visit is dropped, the invitation should be resent by: Send to e-mail at: [email or web address]  Will anyone else be joining your video visit? No      Video Start Time: 11:14 AM.    [facility]    Hepatology follow-up    CONSULTING HEALTHCARE PROVIDER:  Rosy Vu MD.    CHIEF COMPLAINT AND REASON FOR THE VISIT:  New onset ascites.    SUBJECTIVE:  Mrs. 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  We did see her for new onset ascites related with portal hypertension.  Workup done showed that this is compatible with portal hypertension as the SAAG was more than 1.1 and biopsies showed that she had congestive hepatopathy with moderate zone 3 pericellular fibrosis without bridging fibrosis.  Portal pressures (hepatic vein portal pressure gradient) were elevated.  Now as her dialysis was optimized, she has less abdominal distention.  She has minimal edema.  No abdominal pain, has no nausea or vomiting.  She is moving her bowels like once a day.  Her weight is stable.      She has otherwise no issues and has  been relatively doing well, has dyspnea on exertion times 2+.  No chest pain.  Appetite is good and she did not have any infections.  Please note that the patient got vaccinated with Pfizer and has done both doses.  She also was diagnosed with chronic left-sided Aspergillus empyema, and she had  intrapleural antibiotic bead placement and currently she is relatively doing well.    Medical hx Surgical hx   Past Medical History:   Diagnosis Date     Acute on chronic respiratory failure with hypoxia (H) 02/21/2018     Anisocoria      Antisynthetase syndrome (H) 2014     Anxiety      Aspergillus (H)      Aspergillus pneumonia (H) 11/20/2020     Benign essential hypertension      C. difficile colitis      Cytomegalovirus (CMV) viremia (H)      Dermatomyositis (H)      Dysplasia of cervix, low grade (ESTRADA 1)      EBV (Franklin-Barr virus) viremia      ESRD (end stage renal disease) on dialysis (H)      ILD (interstitial lung disease) (H)      Lung replaced by transplant (H)      Osteopenia      Paroxysmal atrial fibrillation (H)      Pneumocystis jiroveci pneumonia (H)      PONV (postoperative nausea and vomiting)      Post-menopause      Pulmonary hypertension (H)      Raynaud's disease      Seronegative rheumatoid arthritis (H)       Past Surgical History:   Procedure Laterality Date     BRONCHOSCOPY (RIGID OR FLEXIBLE), DIAGNOSTIC N/A 4/10/2018    Procedure: COMBINED BRONCHOSCOPY (RIGID OR FLEXIBLE), LAVAGE;;  Surgeon: Mariposa Donohue MD;  Location: [facility]     BRONCHOSCOPY (RIGID OR FLEXIBLE), DIAGNOSTIC N/A 12/23/2020    Procedure: BRONCHOSCOPY, WITH BRONCHOALVEOLAR LAVAGE;  Surgeon: Uri Bass MD;  Location: [facility]     BRONCHOSCOPY (RIGID OR FLEXIBLE), DILATE BRONCHUS / TRACHEA N/A 10/11/2018    Procedure: BRONCHOSCOPY (RIGID OR FLEXIBLE), DILATE BRONCHUS / TRACHEA;  Flexible And Rigid Bronchoscopy and Dilation;  Surgeon: Wilber Lin MD;  [facility]     BRONCHOSCOPY FLEXIBLE N/A 3/13/2018    Procedure: BRONCHOSCOPY FLEXIBLE;  Flexible Bronchoscopy ;  Surgeon: Gissell Sanchez MD;  [facility]: [facility]     BRONCHOSCOPY FLEXIBLE N/A 5/9/2018    Procedure: BRONCHOSCOPY FLEXIBLE;;  Surgeon: Wilber Lin MD;  [facility]     BRONCHOSCOPY FLEXIBLE AND RIGID N/A 9/10/2018     Procedure: BRONCHOSCOPY FLEXIBLE AND RIGID;  Flexible and Rigid Bronchoscopy with Balloon Dilation, tissue debulking with cryo, and Right mainstem bronchus stent placement;  Surgeon: Wilber Lin MD;  [facility]     BRONCHOSCOPY RIGID N/A 6/6/2018    Procedure: BRONCHOSCOPY RIGID;;  Surgeon: Lopez Macias MD;  [facility]     BRONCHOSCOPY, DILATE BRONCHUS, STENT BRONCHUS, COMBINED N/A 6/11/2018    Procedure: COMBINED BRONCHOSCOPY, DILATE BRONCHUS, STENT BRONCHUS;  Flexible Bronchoscopy, Balloon Dilation, Bronchial Washings;  Surgeon: Wilber Lin MD;  [facility]     BRONCHOSCOPY, DILATE BRONCHUS, STENT BRONCHUS, COMBINED Right 7/10/2018    Procedure: COMBINED BRONCHOSCOPY, DILATE BRONCHUS, STENT BRONCHUS;  Flexible Bronchoscopy, Balloon Dilation, Bronchial Washings  ;  Surgeon: Wilber Lin MD;  [facility]     BRONCHOSCOPY, DILATE BRONCHUS, STENT BRONCHUS, COMBINED N/A 8/2/2018    Procedure: COMBINED BRONCHOSCOPY, DILATE BRONCHUS, STENT BRONCHUS;  Flexible Bronchoscopy, Bronchial Washings, Balloon Dilation;  Surgeon: Wilber Lin MD;  [facility]     BRONCHOSCOPY, DILATE BRONCHUS, STENT BRONCHUS, COMBINED N/A 8/20/2018    Procedure: COMBINED BRONCHOSCOPY, DILATE BRONCHUS, STENT BRONCHUS;  Flexible Bronchoscopy, Balloon Dilation;  Surgeon: Wilber Lin MD;  [facility]     BRONCHOSCOPY, DILATE BRONCHUS, STENT BRONCHUS, COMBINED N/A 10/29/2018    Procedure: Flexible Bronchoscopy, Balloon Dilation, Stent Revision, Airway Examination And Therapeutic Suctioning, Cyro Tumor Debulking;  Surgeon: Wilber Lin MD;  [facility]     BRONCHOSCOPY, DILATE BRONCHUS, STENT BRONCHUS, COMBINED N/A 11/20/2018    Procedure: Rigid Bronchoscopy, Stent Removal and dilitation;  Surgeon: Wilber Lin MD;  [facility]     BRONCHOSCOPY, DILATE BRONCHUS, STENT BRONCHUS, COMBINED N/A 12/14/2018    Procedure: Flexible And Rigid  Bronchoscopy, Balloon Dilation, Bronchial Washing;  Surgeon: Wilber Lin MD;  [facility]     BRONCHOSCOPY, DILATE BRONCHUS, STENT BRONCHUS, COMBINED N/A 1/17/2019    Procedure: Flexible And Rigid Bronchoscopy, Balloon Dilation.;  Surgeon: Wilber iLn MD;  [facility]     BRONCHOSCOPY, DILATE BRONCHUS, STENT BRONCHUS, COMBINED N/A 3/7/2019    Procedure: Flexible and Rigid Bronchoscopy, Bronchial Washing, Balloon Dilation;  Surgeon: Wilber Lin MD;  [facility]     BRONCHOSCOPY, DILATE BRONCHUS, STENT BRONCHUS, COMBINED N/A 6/6/2019    Procedure: Rigid and Flexible Bronchoscopy, Balloon Dilation;  Surgeon: Wilber Lin MD;  [facility]     BRONCHOSCOPY, DILATE BRONCHUS, STENT BRONCHUS, COMBINED N/A 10/11/2019    Procedure: Flexible and Rigid Bronchoscopy, Balloon Dilation, Bronchoalveolar Lagave;  Surgeon: Wilber Lin MD;  [facility]     BRONCHOSCOPY, DILATE BRONCHUS, STENT BRONCHUS, COMBINED N/A 2/19/2021    Procedure: BRONCHOSCOPY, flexible, airway dilation, and bronchial wash;  Surgeon: Wilber Lin MD;  [facility]     BRONCHOSCOPY, DILATE BRONCHUS, STENT BRONCHUS, COMBINED N/A 4/9/2021    Procedure: BRONCHOSCOPY, flexible and rigid, Airway suctioning;  Surgeon: Mati Norris MD;  [facility]     CV RIGHT HEART CATH MEASUREMENTS RECORDED N/A 3/10/2020    Procedure: CV RIGHT HEART CATH;  Surgeon: Wai Garcia MD;  [facility]     ENT SURGERY      tonsillectomy as a child     ESOPHAGOSCOPY, GASTROSCOPY, DUODENOSCOPY (EGD), COMBINED N/A 10/29/2018    Procedure: COMBINED ESOPHAGOSCOPY, GASTROSCOPY, DUODENOSCOPY (EGD) with biopsies and polypectomy;  Surgeon: Chente Bloom MD;  [facility]     INSERT EXTRACORPORAL MEMBRANE OXYGENATOR ADULT (OUTSIDE OR) N/A 2/27/2018    Procedure: INSERT EXTRACORPORAL MEMBRANE OXYGENATOR ADULT (OUTSIDE OR);  INSERT EXTRACORPORAL MEMBRANE  OXYGENATOR ADULT (OUTSIDE OR) ;  Surgeon: Toby Hernandez MD;  [facility]     IR CVC TUNNEL PLACEMENT > 5 YRS OF AGE  10/25/2019     IR DIALYSIS FISTULOGRAM LEFT  3/2/2021     IR DIALYSIS MECH THROMB, PTA  3/2/2021     IR FLUORO 0-1 HOUR  5/7/2021     IR GASTRO JEJUNOSTOMY TUBE PLACEMENT  2/16/2021     IR PARACENTESIS  1/8/2020     IR THORACENTESIS  9/13/2019     IR TRANSCATHETER BIOPSY  1/8/2020     LASER CO2 BRONCHOSCOPY N/A 4/30/2021    Procedure: Flexible and Rigid Bronchoscopy and Tissue Debulking with CO2 Laser Assistance;  Surgeon: Mati Norris MD;  [facility]     LASER CO2 BRONCHOSCOPY N/A 6/11/2021    Procedure: BRONCHOSCOPY, Flexible and Rigid Bronchoscopy, Tissue Debulking with cryo Assistance, airway dilation,;  Surgeon: Mati Norris MD;  [facility]     no prior surgery       REMOVE EXTRACORPORAL MEMBRANE OXYGENATOR ADULT N/A 3/3/2018    Procedure: REMOVE EXTRACORPORAL MEMBRANE OXYGENATOR ADULT;  Removal of Right Femoral Venous and Right Axillary Arterial Extracorporeal Membrane Oxygenator;  Surgeon: Toby Hernandez MD;  [facility]     TRANSPLANT LUNG RECIPIENT SINGLE X2 Bilateral 3/1/2018    Procedure: TRANSPLANT LUNG RECIPIENT SINGLE X2;  Median Sternotomy, Extracorporeal Membrane Oxygenator, bilateral sequential lung transplant;  Surgeon: Toby Hernandez MD;  [facility]          Medications  Prior to Admission medications    Medication Sig Start Date End Date Taking? Authorizing Provider   acetaminophen (TYLENOL) 325 MG tablet Take 1 tablet (325 mg) by mouth every 4 hours as needed for mild pain or fever 2/25/21  Yes Leelee Huang MD   albuterol (PROVENTIL) (2.5 MG/3ML) 0.083% neb solution Take 1 vial (2.5 mg) by nebulization 2 times daily 6/10/21  Yes Tarik Christensen MD   blood glucose (NO BRAND SPECIFIED) test strip Use to test blood sugar 4 times daily or as directed. 3/4/21  Yes Lissett Rodriguez PA-C   blood glucose (NO  BRAND SPECIFIED) test strip Use to test blood sugar 4 times daily or as directed. 3/4/21  Yes Lissett Rodriguez PA-C   budesonide (PULMICORT) 0.5 MG/2ML neb solution Take 2 mLs (0.5 mg) by nebulization 2 times daily 6/14/21  Yes Ame Chow PA-C   fluticasone (FLONASE) 50 MCG/ACT nasal spray Spray 1 spray into both nostrils daily 10/6/20  Yes Ame Chow PA-C   Magnesium Cl-Calcium Carbonate (SLOW-MAG) 71.5-119 MG TBEC Take 3 tablets by mouth four times a week Take on non dialysis days T/Th/Sa/Su 4/8/21  Yes Yenni Graham MD   metoprolol tartrate (LOPRESSOR) 25 MG tablet 1 tablet (25 mg) by Oral or Feeding Tube route 2 times daily 5/24/21  Yes Clarisse Roberson MD   multivitamin RENAL (RENAVITE RX/NEPHROVITE) 1 MG tablet Take 1 tablet by mouth daily 4/6/21  Yes Ame Chow PA-C   ondansetron (ZOFRAN-ODT) 4 MG ODT tab Take 1 tablet (4 mg) by mouth every 6 hours as needed for nausea or vomiting 3/4/21  Yes Lissett Rodriguez PA-C   pantoprazole (PROTONIX) 40 MG EC tablet Take 1 tablet (40 mg) by mouth every morning (before breakfast) 3/5/21  Yes Lissett Rodriguez PA-C   posaconazole (NOXAFIL) 100 MG EC tablet Take 3 tablets (300 mg) by mouth daily 3/5/21  Yes Lissett Rodriguez PA-C   predniSONE (DELTASONE) 2.5 MG tablet Take 1 tablet (2.5 mg) by mouth every evening 4/6/21  Yes Ame Chow PA-C   predniSONE (DELTASONE) 5 MG tablet Take 1 tablet (5 mg) by mouth every morning 4/6/21  Yes Ame Chow PA-C   sulfamethoxazole-trimethoprim (BACTRIM) 400-80 MG tablet Take 1 tablet by mouth Every Mon, Wed, Fri Morning 4/7/21  Yes Ame Chow PA-C   tacrolimus (GENERIC EQUIVALENT) 0.5 MG capsule Take 1 capsule (0.5 mg) by mouth 2 times daily 6/11/21  Yes Ame Chow PA-C       Allergies  No Known Allergies    Review of systems  A 10-point review of systems was negative    Examination  LMP 06/07/2014 (Exact Date)   There is no height or weight on  file to calculate BMI.    Gen- well, NAD, A+Ox3, normal color  Psych- normal mood    Laboratory  Lab Results   Component Value Date     05/07/2021    POTASSIUM 4.3 05/07/2021    CHLORIDE 102 05/07/2021    CO2 23 05/07/2021    BUN 66 05/07/2021    CR 3.90 05/07/2021       Lab Results   Component Value Date    BILITOTAL 0.9 03/29/2021    ALT 36 03/29/2021    AST 19 03/29/2021    ALKPHOS 288 03/29/2021       Lab Results   Component Value Date    ALBUMIN 3.5 03/29/2021    PROTTOTAL 7.2 05/01/2021        Lab Results   Component Value Date    WBC 7.5 05/02/2021    HGB 10.6 05/02/2021    MCV 97 05/02/2021     05/02/2021       Lab Results   Component Value Date    INR 0.99 04/29/2021       Radiology    ASSESSMENT/PLAN:  New onset ascites.  Ms. Blue had new onset ascites. Fluid analysis was compatible with portal hypertension.  She did have a diagnosis of congestive hepatopathy with moderate zone 3 hepatocellular fibrosis.  Her dialysis was optimized and it appears that she has less fluid now.  Plan will be to get her liver function tests and also we will check an abdominal ultrasound.     For her other medical issues including her lung transplantation and immunosuppression, she will follow up with her Pulmonary group.     Feng Denise MD  Hepatology  [facility]    Video-Visit Details    Type of service:  Video Visit    Video End Time:11:29 AM.    Originating Location (pt. Location): Home    Distant Location (provider location):  [facility] [place]     Platform used for Video Visit: Shaheen        Again, thank you for allowing me to participate in the care of your patient.        Sincerely,        Feng Denise MD

## 2021-06-22 NOTE — PROGRESS NOTES
Kecia is a 60 year old who is being evaluated via a billable video visit.      How would you like to obtain your AVS? Mail a copy  If the video visit is dropped, the invitation should be resent by: Send to e-mail at: [email or web address]  Will anyone else be joining your video visit? No      Video Start Time: 11:14 AM.    [facility]    Hepatology follow-up    CONSULTING HEALTHCARE PROVIDER:  Rosy Vu MD.    CHIEF COMPLAINT AND REASON FOR THE VISIT:  New onset ascites.    SUBJECTIVE:  Mrs. 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  We did see her for new onset ascites related with portal hypertension.  Workup done showed that this is compatible with portal hypertension as the SAAG was more than 1.1 and biopsies showed that she had congestive hepatopathy with moderate zone 3 pericellular fibrosis without bridging fibrosis.  Portal pressures (hepatic vein portal pressure gradient) were elevated.  Now as her dialysis was optimized, she has less abdominal distention.  She has minimal edema.  No abdominal pain, has no nausea or vomiting.  She is moving her bowels like once a day.  Her weight is stable.      She has otherwise no issues and has  been relatively doing well, has dyspnea on exertion times 2+.  No chest pain.  Appetite is good and she did not have any infections.  Please note that the patient got vaccinated with Pfizer and has done both doses.  She also was diagnosed with chronic left-sided Aspergillus empyema, and she had intrapleural antibiotic bead placement and currently she is relatively doing well.    Medical hx Surgical hx   Past Medical History:   Diagnosis Date     Acute on chronic respiratory failure with hypoxia (H) 02/21/2018     Anisocoria      Antisynthetase syndrome (H) 2014     Anxiety       Aspergillus (H)      Aspergillus pneumonia (H) 11/20/2020     Benign essential hypertension      C. difficile colitis      Cytomegalovirus (CMV) viremia (H)      Dermatomyositis (H)      Dysplasia of cervix, low grade (ESTRADA 1)      EBV (Franklin-Barr virus) viremia      ESRD (end stage renal disease) on dialysis (H)      ILD (interstitial lung disease) (H)      Lung replaced by transplant (H)      Osteopenia      Paroxysmal atrial fibrillation (H)      Pneumocystis jiroveci pneumonia (H)      PONV (postoperative nausea and vomiting)      Post-menopause      Pulmonary hypertension (H)      Raynaud's disease      Seronegative rheumatoid arthritis (H)       Past Surgical History:   Procedure Laterality Date     BRONCHOSCOPY (RIGID OR FLEXIBLE), DIAGNOSTIC N/A 4/10/2018    Procedure: COMBINED BRONCHOSCOPY (RIGID OR FLEXIBLE), LAVAGE;;  Surgeon: Mariposa Donohue MD;  [facility]     BRONCHOSCOPY (RIGID OR FLEXIBLE), DIAGNOSTIC N/A 12/23/2020    Procedure: BRONCHOSCOPY, WITH BRONCHOALVEOLAR LAVAGE;  Surgeon: Uri Bass MD;  [facility]     BRONCHOSCOPY (RIGID OR FLEXIBLE), DILATE BRONCHUS / TRACHEA N/A 10/11/2018    Procedure: BRONCHOSCOPY (RIGID OR FLEXIBLE), DILATE BRONCHUS / TRACHEA;  Flexible And Rigid Bronchoscopy and Dilation;  Surgeon: Wilber Lin MD;  [facility]     BRONCHOSCOPY FLEXIBLE N/A 3/13/2018    Procedure: BRONCHOSCOPY FLEXIBLE;  Flexible Bronchoscopy ;  Surgeon: Gissell Sanchez MD;  [facility]     BRONCHOSCOPY FLEXIBLE N/A 5/9/2018    Procedure: BRONCHOSCOPY FLEXIBLE;;  Surgeon: Wilber Lin MD;  [facility]     BRONCHOSCOPY FLEXIBLE AND RIGID N/A 9/10/2018    Procedure: BRONCHOSCOPY FLEXIBLE AND RIGID;  Flexible and Rigid Bronchoscopy with Balloon Dilation, tissue debulking with cryo, and Right mainstem bronchus stent placement;  Surgeon: Wilber Lin MD;  [facility]     BRONCHOSCOPY RIGID N/A 6/6/2018    Procedure: BRONCHOSCOPY  RIGID;;  Surgeon: Lopez Macias MD;  [facility]     BRONCHOSCOPY, DILATE BRONCHUS, STENT BRONCHUS, COMBINED N/A 6/11/2018    Procedure: COMBINED BRONCHOSCOPY, DILATE BRONCHUS, STENT BRONCHUS;  Flexible Bronchoscopy, Balloon Dilation, Bronchial Washings;  Surgeon: Wilber Lin MD;  [facility]     BRONCHOSCOPY, DILATE BRONCHUS, STENT BRONCHUS, COMBINED Right 7/10/2018    Procedure: COMBINED BRONCHOSCOPY, DILATE BRONCHUS, STENT BRONCHUS;  Flexible Bronchoscopy, Balloon Dilation, Bronchial Washings  ;  Surgeon: Wilber Lni MD;  [facility]     BRONCHOSCOPY, DILATE BRONCHUS, STENT BRONCHUS, COMBINED N/A 8/2/2018    Procedure: COMBINED BRONCHOSCOPY, DILATE BRONCHUS, STENT BRONCHUS;  Flexible Bronchoscopy, Bronchial Washings, Balloon Dilation;  Surgeon: Wilber Lin MD;  [facility]     BRONCHOSCOPY, DILATE BRONCHUS, STENT BRONCHUS, COMBINED N/A 8/20/2018    Procedure: COMBINED BRONCHOSCOPY, DILATE BRONCHUS, STENT BRONCHUS;  Flexible Bronchoscopy, Balloon Dilation;  Surgeon: Wilber Lin MD;  [facility]     BRONCHOSCOPY, DILATE BRONCHUS, STENT BRONCHUS, COMBINED N/A 10/29/2018    Procedure: Flexible Bronchoscopy, Balloon Dilation, Stent Revision, Airway Examination And Therapeutic Suctioning, Cyro Tumor Debulking;  Surgeon: Wilber Lin MD;  [facility]     BRONCHOSCOPY, DILATE BRONCHUS, STENT BRONCHUS, COMBINED N/A 11/20/2018    Procedure: Rigid Bronchoscopy, Stent Removal and dilitation;  Surgeon: Wilber Lin MD;  [facility]     BRONCHOSCOPY, DILATE BRONCHUS, STENT BRONCHUS, COMBINED N/A 12/14/2018    Procedure: Flexible And Rigid Bronchoscopy, Balloon Dilation, Bronchial Washing;  Surgeon: Wilber Lin MD;  [facility]     BRONCHOSCOPY, DILATE BRONCHUS, STENT BRONCHUS, COMBINED N/A 1/17/2019    Procedure: Flexible And Rigid Bronchoscopy, Balloon Dilation.;  Surgeon: Wilber Lin MD;  [facility]     BRONCHOSCOPY, DILATE BRONCHUS, STENT BRONCHUS, COMBINED N/A 3/7/2019    Procedure: Flexible and Rigid Bronchoscopy, Bronchial Washing, Balloon Dilation;  Surgeon: Wilber Lin MD;  [facility]     BRONCHOSCOPY, DILATE BRONCHUS, STENT BRONCHUS, COMBINED N/A 6/6/2019    Procedure: Rigid and Flexible Bronchoscopy, Balloon Dilation;  Surgeon: Wilber Lin MD;  [facility]     BRONCHOSCOPY, DILATE BRONCHUS, STENT BRONCHUS, COMBINED N/A 10/11/2019    Procedure: Flexible and Rigid Bronchoscopy, Balloon Dilation, Bronchoalveolar Lagave;  Surgeon: Wilber Lin MD;  [facility]     BRONCHOSCOPY, DILATE BRONCHUS, STENT BRONCHUS, COMBINED N/A 2/19/2021    Procedure: BRONCHOSCOPY, flexible, airway dilation, and bronchial wash;  Surgeon: Wilber Lin MD;  [facility]     BRONCHOSCOPY, DILATE BRONCHUS, STENT BRONCHUS, COMBINED N/A 4/9/2021    Procedure: BRONCHOSCOPY, flexible and rigid, Airway suctioning;  Surgeon: Mati Norris MD;  [facility]     CV RIGHT HEART CATH MEASUREMENTS RECORDED N/A 3/10/2020    Procedure: CV RIGHT HEART CATH;  Surgeon: Wai Garcia MD;  [facility]     ENT SURGERY      tonsillectomy as a child     ESOPHAGOSCOPY, GASTROSCOPY, DUODENOSCOPY (EGD), COMBINED N/A 10/29/2018    Procedure: COMBINED ESOPHAGOSCOPY, GASTROSCOPY, DUODENOSCOPY (EGD) with biopsies and polypectomy;  Surgeon: Chente Bloom MD;  [facility]     INSERT EXTRACORPORAL MEMBRANE OXYGENATOR ADULT (OUTSIDE OR) N/A 2/27/2018    Procedure: INSERT EXTRACORPORAL MEMBRANE OXYGENATOR ADULT (OUTSIDE OR);  INSERT EXTRACORPORAL MEMBRANE OXYGENATOR ADULT (OUTSIDE OR) ;  Surgeon: Toby Hernandez MD;  [facility]     IR CVC TUNNEL PLACEMENT > 5 YRS OF AGE  10/25/2019     IR DIALYSIS FISTULOGRAM LEFT  3/2/2021     IR DIALYSIS MECH THROMB, PTA  3/2/2021     IR FLUORO 0-1 HOUR  5/7/2021     IR GASTRO JEJUNOSTOMY TUBE  PLACEMENT  2/16/2021     IR PARACENTESIS  1/8/2020     IR THORACENTESIS  9/13/2019     IR TRANSCATHETER BIOPSY  1/8/2020     LASER CO2 BRONCHOSCOPY N/A 4/30/2021    Procedure: Flexible and Rigid Bronchoscopy and Tissue Debulking with CO2 Laser Assistance;  Surgeon: Mati Norris MD;  [facility]     LASER CO2 BRONCHOSCOPY N/A 6/11/2021    Procedure: BRONCHOSCOPY, Flexible and Rigid Bronchoscopy, Tissue Debulking with cryo Assistance, airway dilation,;  Surgeon: Mati Norris MD;  [facility]     no prior surgery       REMOVE EXTRACORPORAL MEMBRANE OXYGENATOR ADULT N/A 3/3/2018    Procedure: REMOVE EXTRACORPORAL MEMBRANE OXYGENATOR ADULT;  Removal of Right Femoral Venous and Right Axillary Arterial Extracorporeal Membrane Oxygenator;  Surgeon: Toby Hernandez MD;  [facility]     TRANSPLANT LUNG RECIPIENT SINGLE X2 Bilateral 3/1/2018    Procedure: TRANSPLANT LUNG RECIPIENT SINGLE X2;  Median Sternotomy, Extracorporeal Membrane Oxygenator, bilateral sequential lung transplant;  Surgeon: Toby Hernanedz MD;  [facility]          Medications  Prior to Admission medications    Medication Sig Start Date End Date Taking? Authorizing Provider   acetaminophen (TYLENOL) 325 MG tablet Take 1 tablet (325 mg) by mouth every 4 hours as needed for mild pain or fever 2/25/21  Yes Leelee Huang MD   albuterol (PROVENTIL) (2.5 MG/3ML) 0.083% neb solution Take 1 vial (2.5 mg) by nebulization 2 times daily 6/10/21  Yes Tarik Christensen MD   blood glucose (NO BRAND SPECIFIED) test strip Use to test blood sugar 4 times daily or as directed. 3/4/21  Yes Lissett Rodriguez PA-C   blood glucose (NO BRAND SPECIFIED) test strip Use to test blood sugar 4 times daily or as directed. 3/4/21  Yes Lissett Rodriguez PA-C   budesonide (PULMICORT) 0.5 MG/2ML neb solution Take 2 mLs (0.5 mg) by nebulization 2 times daily 6/14/21  Yes Ame Chow PA-C   fluticasone (FLONASE) 50 MCG/ACT  nasal spray Spray 1 spray into both nostrils daily 10/6/20  Yes Ame Chow PA-C   Magnesium Cl-Calcium Carbonate (SLOW-MAG) 71.5-119 MG TBEC Take 3 tablets by mouth four times a week Take on non dialysis days T/Th/Sa/Su 4/8/21  Yes Yenni Graham MD   metoprolol tartrate (LOPRESSOR) 25 MG tablet 1 tablet (25 mg) by Oral or Feeding Tube route 2 times daily 5/24/21  Yes Clarisse Roberson MD   multivitamin RENAL (RENAVITE RX/NEPHROVITE) 1 MG tablet Take 1 tablet by mouth daily 4/6/21  Yes Ame Chow PA-C   ondansetron (ZOFRAN-ODT) 4 MG ODT tab Take 1 tablet (4 mg) by mouth every 6 hours as needed for nausea or vomiting 3/4/21  Yes Lissett Rodriguez PA-C   pantoprazole (PROTONIX) 40 MG EC tablet Take 1 tablet (40 mg) by mouth every morning (before breakfast) 3/5/21  Yes Lissett Rodriguez PA-C   posaconazole (NOXAFIL) 100 MG EC tablet Take 3 tablets (300 mg) by mouth daily 3/5/21  Yes Lissett Rodriguez PA-C   predniSONE (DELTASONE) 2.5 MG tablet Take 1 tablet (2.5 mg) by mouth every evening 4/6/21  Yes Ame Chow PA-C   predniSONE (DELTASONE) 5 MG tablet Take 1 tablet (5 mg) by mouth every morning 4/6/21  Yes Ame Chow PA-C   sulfamethoxazole-trimethoprim (BACTRIM) 400-80 MG tablet Take 1 tablet by mouth Every Mon, Wed, Fri Morning 4/7/21  Yes Ame Chow PA-C   tacrolimus (GENERIC EQUIVALENT) 0.5 MG capsule Take 1 capsule (0.5 mg) by mouth 2 times daily 6/11/21  Yes Ame Chow PA-C       Allergies  No Known Allergies    Review of systems  A 10-point review of systems was negative    Examination  LMP 06/07/2014 (Exact Date)   There is no height or weight on file to calculate BMI.    Gen- well, NAD, A+Ox3, normal color  Psych- normal mood    Laboratory  Lab Results   Component Value Date     05/07/2021    POTASSIUM 4.3 05/07/2021    CHLORIDE 102 05/07/2021    CO2 23 05/07/2021    BUN 66 05/07/2021    CR 3.90 05/07/2021       Lab Results    Component Value Date    BILITOTAL 0.9 03/29/2021    ALT 36 03/29/2021    AST 19 03/29/2021    ALKPHOS 288 03/29/2021       Lab Results   Component Value Date    ALBUMIN 3.5 03/29/2021    PROTTOTAL 7.2 05/01/2021        Lab Results   Component Value Date    WBC 7.5 05/02/2021    HGB 10.6 05/02/2021    MCV 97 05/02/2021     05/02/2021       Lab Results   Component Value Date    INR 0.99 04/29/2021       Radiology    ASSESSMENT/PLAN:  New onset ascites.  Ms. Blue had new onset ascites. Fluid analysis was compatible with portal hypertension.  She did have a diagnosis of congestive hepatopathy with moderate zone 3 hepatocellular fibrosis.  Her dialysis was optimized and it appears that she has less fluid now.  Plan will be to get her liver function tests and also we will check an abdominal ultrasound.     For her other medical issues including her lung transplantation and immunosuppression, she will follow up with her Pulmonary group.     Feng Denise MD  Hepatology  [facility]    Video-Visit Details    Type of service:  Video Visit    Video End Time:11:29 AM.    Originating Location (pt. Location): Home    Distant Location (provider location):  [facility] [place]     Platform used for Video Visit: [employer]

## 2021-06-30 ENCOUNTER — TELEPHONE (OUTPATIENT)
Dept: TRANSPLANT | Facility: CLINIC | Age: 61
End: 2021-06-30

## 2021-06-30 DIAGNOSIS — Z94.2 S/P LUNG TRANSPLANT (H): ICD-10-CM

## 2021-06-30 PROCEDURE — 80197 ASSAY OF TACROLIMUS: CPT | Performed by: INTERNAL MEDICINE

## 2021-06-30 NOTE — TELEPHONE ENCOUNTER
DATE:  6/30/2021   TIME OF RECEIPT FROM LAB:  1594  LAB TEST:  [place] Phos 2.97/Creatinine 5.6  RESULTS GIVEN WITH READ-BACK TO (PROVIDER):  Mikayla Latham RN   TIME LAB VALUE REPORTED TO PROVIDER:   2946

## 2021-07-01 LAB
TACROLIMUS BLD-MCNC: 7.2 UG/L (ref 5–15)
TME LAST DOSE: NORMAL H

## 2021-07-01 NOTE — RESULT ENCOUNTER NOTE
Armand Mcdonnell, your tacrolimus level was 7.2 at 12 hours on 6/30/21 which is close to your goal range of 8-10. No dose change at this time, let's recheck in a few weeks and if still low, we'll adjust accordingly. Please call the transplant office ([phone]) with any questions. Thanks, Mikayla

## 2021-07-07 ENCOUNTER — TELEPHONE (OUTPATIENT)
Dept: TRANSPLANT | Facility: CLINIC | Age: 61
End: 2021-07-07

## 2021-07-07 ENCOUNTER — TELEPHONE (OUTPATIENT)
Dept: PULMONOLOGY | Facility: CLINIC | Age: 61
End: 2021-07-07

## 2021-07-07 DIAGNOSIS — Z94.2 S/P LUNG TRANSPLANT (H): ICD-10-CM

## 2021-07-07 DIAGNOSIS — Z94.2 LUNG REPLACED BY TRANSPLANT (H): Primary | ICD-10-CM

## 2021-07-07 DIAGNOSIS — Z94.2 LUNG REPLACED BY TRANSPLANT (H): ICD-10-CM

## 2021-07-07 RX ORDER — POSACONAZOLE 100 MG/1
300 TABLET, DELAYED RELEASE ORAL DAILY
Qty: 270 TABLET | Refills: 0 | Status: SHIPPED | OUTPATIENT
Start: 2021-07-07 | End: 2021-10-12

## 2021-07-07 RX ORDER — TACROLIMUS 1 MG/1
CAPSULE ORAL
Qty: 30 CAPSULE | Refills: 4 | Status: SHIPPED | OUTPATIENT
Start: 2021-07-07 | End: 2021-09-23

## 2021-07-07 RX ORDER — TACROLIMUS 0.5 MG/1
0.5 CAPSULE ORAL 2 TIMES DAILY
Qty: 60 CAPSULE | Refills: 11 | Status: SHIPPED | OUTPATIENT
Start: 2021-07-07 | End: 2021-07-30

## 2021-07-07 NOTE — TELEPHONE ENCOUNTER
Patient Call: Medication Refill  Needs refills;  new script for Posaconazde 100mg  Tacrolimus 0.5mg    Pharmacy Name: [facility] #[id number] [address] (on [address])    When will the patient be out of this medication?: Less than 3 days (Route high priority)

## 2021-07-07 NOTE — TELEPHONE ENCOUNTER
PA Initiation    Medication: Posaconazole 100mg - PA Initiated  Insurance Company: [employer] [place] - Phone [phone] Fax [phone]  Pharmacy Filling the Rx:    Filling Pharmacy Phone:    Filling Pharmacy Fax:    Start Date: 7/7/2021

## 2021-07-07 NOTE — TELEPHONE ENCOUNTER
Prior Authorization Approval    Authorization Effective Date: 7/13/2021  Authorization Expiration Date: 1/13/2022  Medication: Posaconazole 100mg - PA Approved  Approved Dose/Quantity: 270/90  Reference #: [id number]   Insurance Company: [employer] [place] - Phone [phone] Fax [phone]  Expected CoPay:       CoPay Card Available:      Foundation Assistance Needed:    Which Pharmacy is filling the prescription (Not needed for infusion/clinic administered):    Pharmacy Notified:    Patient Notified:

## 2021-07-14 ENCOUNTER — TELEPHONE (OUTPATIENT)
Dept: TRANSPLANT | Facility: CLINIC | Age: 61
End: 2021-07-14

## 2021-07-14 ENCOUNTER — LAB (OUTPATIENT)
Dept: LAB | Facility: CLINIC | Age: 61
End: 2021-07-14
Payer: COMMERCIAL

## 2021-07-14 PROCEDURE — 80197 ASSAY OF TACROLIMUS: CPT

## 2021-07-14 NOTE — TELEPHONE ENCOUNTER
DATE:  7/14/2021   TIME OF RECEIPT FROM LAB:  1212  LAB TEST:  Alk Phos  LAB VALUE:  373  RESULTS GIVEN WITH READ-BACK TO (PROVIDER):  Gaye Castaneda for Mikayla Latham  TIME LAB VALUE REPORTED TO PROVIDER:   1228

## 2021-07-15 LAB
TACROLIMUS BLD-MCNC: 13.9 UG/L (ref 5–15)
TME LAST DOSE: NORMAL H
TME LAST DOSE: NORMAL H

## 2021-07-16 NOTE — RESULT ENCOUNTER NOTE
Tacrolimus level 13.9 at 12 hours, on 7/15/21.  Goal 8-10.   Current dose 0.5 mg in AM, 0.5 mg in PM    No change for now, plan to recheck in 2 weeks and if still elevated, will need to switch to suspension form. LM with patient regarding this.

## 2021-07-28 ENCOUNTER — TELEPHONE (OUTPATIENT)
Dept: TRANSPLANT | Facility: CLINIC | Age: 61
End: 2021-07-28

## 2021-07-28 ENCOUNTER — LAB (OUTPATIENT)
Dept: LAB | Facility: CLINIC | Age: 61
End: 2021-07-28
Payer: COMMERCIAL

## 2021-07-28 DIAGNOSIS — Z94.2 S/P LUNG TRANSPLANT (H): ICD-10-CM

## 2021-07-28 PROCEDURE — 80197 ASSAY OF TACROLIMUS: CPT

## 2021-07-28 NOTE — TELEPHONE ENCOUNTER
DATE:  7/28/2021   TIME OF RECEIPT FROM LAB:  12:21 PM  LAB TEST:  Alk Phos  LAB VALUE:  390  RESULTS GIVEN WITH READ-BACK TO (PROVIDER):  Sent teams message to Mraleny Brown RN  TIME LAB VALUE REPORTED TO PROVIDER:   12:36 PM

## 2021-07-30 ENCOUNTER — TELEPHONE (OUTPATIENT)
Dept: TRANSPLANT | Facility: CLINIC | Age: 61
End: 2021-07-30

## 2021-07-30 DIAGNOSIS — Z94.2 LUNG REPLACED BY TRANSPLANT (H): ICD-10-CM

## 2021-07-30 LAB
TACROLIMUS BLD-MCNC: 6.9 UG/L (ref 5–15)
TME LAST DOSE: NORMAL H
TME LAST DOSE: NORMAL H

## 2021-07-30 RX ORDER — TACROLIMUS 0.5 MG/1
CAPSULE ORAL
Qty: 90 CAPSULE | Refills: 11 | Status: SHIPPED | OUTPATIENT
Start: 2021-07-30 | End: 2021-09-23

## 2021-07-30 NOTE — TELEPHONE ENCOUNTER
Tacrolimus level 6.9 at 12 hours, on 7-28-21.  Goal 8-10.   Current dose 0.5 mg in AM, 0.5 mg in PM    Dose changed to 1 mg in AM, 0.5 mg in PM   Recheck level in 7-10 days    Discussed with patient   My chart message sent

## 2021-08-04 NOTE — DISCHARGE INSTRUCTIONS
Post Bronchoscopy Patient Instructions:    September 10, 2018  Kecia Blue    Your procedure completed (bronchoscopy with dilation and stent placement) without any immediate complications.  You may cough up scant amount of blood for the next 12 hours. If you have excessive cough with blood, chest pain, shortness of breath, please report to the closest emergency room.    You may experience low grade (less than 100.5 F) fever next 24 hours, if so you can take tylenol. If the fever persists more than 24 hours contact to our office or your primary care provider.    Our office (Thoracic/Pulmonary--[phone]) will call you to schedule nect bronchoscopy    Should you have any question, please do not hesitate to call our office.    DA Lin MD  [facility], [place]  Same-Day Surgery   Adult Discharge Orders & Instructions     For 24 hours after surgery    1. Get plenty of rest.  A responsible adult must stay with you for at least 24 hours after you leave the hospital.   2. Do not drive or use heavy equipment.  If you have weakness or tingling, don't drive or use heavy equipment until this feeling goes away.  3. Do not drink alcohol.  4. Avoid strenuous or risky activities.  Ask for help when climbing stairs.   5. You may feel lightheaded.  IF so, sit for a few minutes before standing.  Have someone help you get up.   6. If you have nausea (feel sick to your stomach): Drink only clear liquids such as apple juice, ginger ale, broth or 7-Up.  Rest may also help.  Be sure to drink enough fluids.  Move to a regular diet as you feel able.  7. You may have a slight fever. Call the doctor if your fever is over 100 F (37.7 C) (taken under the tongue) or lasts longer than 24 hours.  8. You may have a dry mouth, a sore throat, muscle aches or trouble sleeping.  These should go away after 24 hours.  9. Do not make important or legal decisions.   Call your doctor for any of the  followin.  Signs of infection (fever, growing tenderness at the surgery site, a large amount of drainage or bleeding, severe pain, foul-smelling drainage, redness, swelling).    2. It has been over 8 to 10 hours since surgery and you are still not able to urinate (pass water).    3.  Headache for over 24 hours.    4.  Numbness, tingling or weakness the day after surgery (if you had spinal anesthesia).  To contact doctor eleanor, call [phone] or:        [phone] and ask for the resident on call for pulmonology (answered 24 hours a day)      Emergency Department:    [facility]: [phone]          Age appropriate behavior/Recognizes caregiver

## 2021-08-11 ENCOUNTER — LAB (OUTPATIENT)
Dept: LAB | Facility: CLINIC | Age: 61
End: 2021-08-11
Payer: COMMERCIAL

## 2021-08-11 DIAGNOSIS — Z94.2 S/P LUNG TRANSPLANT (H): ICD-10-CM

## 2021-08-11 PROCEDURE — 80197 ASSAY OF TACROLIMUS: CPT

## 2021-08-12 ENCOUNTER — TELEPHONE (OUTPATIENT)
Dept: TRANSPLANT | Facility: CLINIC | Age: 61
End: 2021-08-12

## 2021-08-12 LAB
TACROLIMUS BLD-MCNC: 10.3 UG/L (ref 5–15)
TME LAST DOSE: NORMAL H
TME LAST DOSE: NORMAL H

## 2021-08-12 NOTE — LETTER
PHYSICIAN ORDERS      DATE & TIME ISSUED: 2021 10:17 AM  PATIENT NAME: Kecia Blue   : 1960     [facility] MR# [if applicable]: [id number]     DIAGNOSIS:  Lung Transplant  Z94.2  Non-contrast chest CT    Please contact patient to schedule p: [phone]      Any questions please call: Mikayla [phone]    Please fax these results to [phone].      .

## 2021-08-12 NOTE — LETTER
PHYSICIAN ORDERS      DATE & TIME ISSUED: 2021 10:17 AM  PATIENT NAME: Kecia Blue   : 1960     [facility] MR# [if applicable]: [id number]     DIAGNOSIS:  Lung Transplant  Z94.2  Non-contrast chest CT    Please contact patient to schedule p: [phone]      Any questions please call: Mikayla [phone]    Please fax these results to [phone].      .

## 2021-08-12 NOTE — TELEPHONE ENCOUNTER
Per Dr. Christensen, patient to get a CT scan sometime in August. Order faxed to [facility]. Patient notified.

## 2021-08-12 NOTE — RESULT ENCOUNTER NOTE
Armand Mcdonnell, your tacrolimus level was 10.3 at 12 hours on 8/11/21 which is within your goal range of 8-10. No dose change at this time. Please call the transplant office ([phone]) with any questions. Thanks, Mikayla

## 2021-08-12 NOTE — TELEPHONE ENCOUNTER
DATE:  8/12/2021   TIME OF RECEIPT FROM LAB:  0849  LAB TEST:  Alkaline Phosphatase 284 and Creatinine 5.2  LAB VALUE:  See Above   RESULTS GIVEN WITH READ-BACK TO (PROVIDER):  Mikayla Latham RN   TIME LAB VALUE REPORTED TO PROVIDER:   0813

## 2021-08-20 ENCOUNTER — TELEPHONE (OUTPATIENT)
Dept: TRANSPLANT | Facility: CLINIC | Age: 61
End: 2021-08-20

## 2021-08-20 NOTE — TELEPHONE ENCOUNTER
Resent order for CT to [facility] in [place].   Will schedule patient on 9/15 with Ame for pre-op.

## 2021-08-20 NOTE — LETTER
PHYSICIAN ORDERS      DATE & TIME ISSUED: 2021 10:17 AM  PATIENT NAME: Kecia Blue   : 1960     [facility] MR# [if applicable]: [id number]     DIAGNOSIS:  Lung Transplant  Z94.2  Non-contrast chest CT    Please contact patient to schedule p: [phone]      Any questions please call: Mikayla [phone]    Please fax these results to [phone].      .

## 2021-08-21 DIAGNOSIS — Z11.59 ENCOUNTER FOR SCREENING FOR OTHER VIRAL DISEASES: ICD-10-CM

## 2021-08-23 NOTE — LETTER
PHYSICIAN ORDERS      DATE & TIME ISSUED: 2020 11:36 AM  PATIENT NAME: Kecia Blue   : 1960     [facility] MR# [if applicable]: [id number]     DIAGNOSIS:  Lung Transplant  Z94.2    Sputum culture aerobic bacteria  Gram stain  Fungus culture- sputum    Any questions please call: Mikayla     Please fax these results to [phone].        Ame Chow PA-C       Price (Do Not Change): 0.00 Instructions: This plan will send the code FBSE to the PM system.  DO NOT or CHANGE the price. Detail Level: Generalized

## 2021-09-01 ENCOUNTER — LAB (OUTPATIENT)
Dept: LAB | Facility: CLINIC | Age: 61
End: 2021-09-01
Payer: COMMERCIAL

## 2021-09-01 ENCOUNTER — TELEPHONE (OUTPATIENT)
Dept: TRANSPLANT | Facility: CLINIC | Age: 61
End: 2021-09-01

## 2021-09-01 DIAGNOSIS — Z94.2 S/P LUNG TRANSPLANT (H): ICD-10-CM

## 2021-09-01 PROCEDURE — 80197 ASSAY OF TACROLIMUS: CPT

## 2021-09-01 NOTE — TELEPHONE ENCOUNTER
Critical values noted.   Alk phosphatase at patient's baseline.   Patient on dialysis (creat 5.2).

## 2021-09-01 NOTE — TELEPHONE ENCOUNTER
DATE:  9/1/2021     TIME OF RECEIPT FROM LAB:  1237    ORDERING PROVIDER: Ame Tsai PA-C     LAB TEST:  Alkaline Phosphate 318 and Creatinine 5.3    LAB VALUE:  See Above     RESULTS GIVEN WITH READ-BACK TO (PROVIDER):  Mikayla Latham RN     TIME LAB VALUE REPORTED TO PROVIDER:   7230    LOYDA Hilario, LPN   Solid Organ Transplant

## 2021-09-02 ENCOUNTER — TELEPHONE (OUTPATIENT)
Dept: TRANSPLANT | Facility: CLINIC | Age: 61
End: 2021-09-02

## 2021-09-02 LAB
TACROLIMUS BLD-MCNC: 9.5 UG/L (ref 5–15)
TME LAST DOSE: NORMAL H
TME LAST DOSE: NORMAL H

## 2021-09-02 NOTE — RESULT ENCOUNTER NOTE
Armand Mcdonnell,   Your tacrolimus level was 9.5 at 12 hours on 9/1/21 which is within your goal range of 8-10. No dose change at this time. Please call the transplant office ([phone]) with any questions.   Thanks,   Mikayla

## 2021-09-02 NOTE — TELEPHONE ENCOUNTER
Pt reports [facility] [facility] hasn't gotten order for CT scan. Order faxed again, will check status this afternoon. She will discuss timing of COVID vaccine with Ame (unsure if okay to give the day before a bronch).

## 2021-09-09 ENCOUNTER — TELEPHONE (OUTPATIENT)
Dept: TRANSPLANT | Facility: CLINIC | Age: 61
End: 2021-09-09

## 2021-09-09 NOTE — TELEPHONE ENCOUNTER
Spoke with patient, she isn't able to get CT scan locally until tomorrow or Tuesday, so will just do scan here next Wednesday. Dr. Christensen okay with this. Pt will cancel the scan locally.

## 2021-09-09 NOTE — TELEPHONE ENCOUNTER
September 9, 2021 12:19 PM - Jamaal Wells CNA: called pt to confirm time for ct scan gertrudis per cord cx xray

## 2021-09-13 NOTE — PROGRESS NOTES
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 This is a routine follow up prior to her bronch in the OR tomorrow.      1. Bilateral lung transplant: no new pulmonary complaints, notes her morning cough has actually improved from prior. Sating 97% on room air. DSA on 5/1/ negative. CT reviewed today and discussed with radiology and demonstrates maybe slight increase in LLL opacities, RLL relatively stable. PFT didn't meet ATS guidelines today, but did improve from prior.   - Continue IS, restart AZA at low dose of 25 mg daily (has been on hold since 2019 for leukopenia per review of notes), continue tacrolimus (goal 8-10) and prednisone   - On Bactrim 3 times/week    2. Right main bronchial stenosis s/p ligation: scheduled for bronch in the OR tomorrow. Symptomatically stable.   - Continue albuterol and pulmicort BID     3. Congestive hepatopathy with fibrosis  New onset ascites: secondary to portal hypertension. Chronic elevation of alk phos. Fluid optimization with dialysis. Recently seen by Dr. Denise.     4. H/o CMV viremia (D+/R+): VGCV ppx with steroid burst, completed 2/23. Last CMV on 9/1/ negative.      5. EBV viremia: level 38, 186 *low 4.6) on 5/1, down from prior values.  - Added on EBV today    6. Left-sided aspergillus empyema: noted 10/8/19. CT scan on 7/17/20 with increased mass-like density, likely pleural-based, in LLL area s/p needle aspiration (8/13/20) with Aspergillus fumigatus on cultures s/p intrapleural bead placement (amphoterecin, 11/20). S/p chest tube drainage in May.   -  Continue posaconazole  - BD glucan and IgG added on today  - Will schedule f/u with zaira Kaur    7. ESRD on iHD (since 10/2019): access is AVF with partial graft. Continues now on iHD M/W/F. Transplant evaluation on hold for ongoing treatment of empyema. Follows with Nephrology    8. HTN: BP is elevated, but Kecia just took her meds.   - Continue metoprolol    Chronic issues:  1. Paroxysmal AFib  2. Hypomagnesemia  3. Dermatomyositis with Raynauds    RTC: TBD bronch  Annuals:   Preventative: will get third covid vaccine on Friday, flu shot October and colonoscopy 2022    Ame Chow PA-C  Pulmonary, Allergy, Critical Care and Sleep Medicine        Interval History:     Feeling good, can live life, besides dialysis. No fever or chills, breathing is good, no shortness of breath, does have a cough in the am, much improved over prior, productive at times. No congestion or tightness. No chest pain or palpitations. No GI complaints, stools are stable, no diarrhea.           Review of Systems:   Please see HPI, otherwise the complete 10 point ROS is negative.           Past Medical and Surgical History:     Past Medical History:   Diagnosis Date     Acute on chronic respiratory failure with hypoxia (H) 02/21/2018     Anisocoria      Antisynthetase syndrome (H) 2014     Anxiety      Aspergillus (H)      Aspergillus pneumonia (H) 11/20/2020     Benign essential hypertension      C. difficile colitis      Cytomegalovirus (CMV) viremia (H)      Dermatomyositis (H)      Dysplasia of cervix, low grade (ESTRADA 1)      EBV (Franklin-Barr virus) viremia      ESRD (end stage renal disease) on dialysis (H)      ILD (interstitial lung disease) (H)      Lung replaced by transplant (H)      Osteopenia      Paroxysmal atrial fibrillation (H)      Pneumocystis jiroveci pneumonia (H)      PONV (postoperative nausea and vomiting)      Post-menopause      Pulmonary hypertension (H)      Raynaud's disease      Seronegative rheumatoid  arthritis (H)      Past Surgical History:   Procedure Laterality Date     BRONCHOSCOPY (RIGID OR FLEXIBLE), DIAGNOSTIC N/A 4/10/2018    Procedure: COMBINED BRONCHOSCOPY (RIGID OR FLEXIBLE), LAVAGE;;  Surgeon: Mariposa Donohue MD;  Location: [facility]     BRONCHOSCOPY (RIGID OR FLEXIBLE), DIAGNOSTIC N/A 12/23/2020    Procedure: BRONCHOSCOPY, WITH BRONCHOALVEOLAR LAVAGE;  Surgeon: Uri Bass MD;  Location: [facility]     BRONCHOSCOPY (RIGID OR FLEXIBLE), DILATE BRONCHUS / TRACHEA N/A 10/11/2018    Procedure: BRONCHOSCOPY (RIGID OR FLEXIBLE), DILATE BRONCHUS / TRACHEA;  Flexible And Rigid Bronchoscopy and Dilation;  Surgeon: Wilber Lin MD;  [facility]     BRONCHOSCOPY FLEXIBLE N/A 3/13/2018    Procedure: BRONCHOSCOPY FLEXIBLE;  Flexible Bronchoscopy ;  Surgeon: Gissell Sanchez MD;  Location: [facility]     BRONCHOSCOPY FLEXIBLE N/A 5/9/2018    Procedure: BRONCHOSCOPY FLEXIBLE;;  Surgeon: Wilber Lin MD;  Location: [facility]     BRONCHOSCOPY FLEXIBLE AND RIGID N/A 9/10/2018    Procedure: BRONCHOSCOPY FLEXIBLE AND RIGID;  Flexible and Rigid Bronchoscopy with Balloon Dilation, tissue debulking with cryo, and Right mainstem bronchus stent placement;  Surgeon: Wilber Lin MD;  [facility]     BRONCHOSCOPY RIGID N/A 6/6/2018    Procedure: BRONCHOSCOPY RIGID;;  Surgeon: Lopez Macias MD;  Location: [facility]     BRONCHOSCOPY, DILATE BRONCHUS, STENT BRONCHUS, COMBINED N/A 6/11/2018    Procedure: COMBINED BRONCHOSCOPY, DILATE BRONCHUS, STENT BRONCHUS;  Flexible Bronchoscopy, Balloon Dilation, Bronchial Washings;  Surgeon: Wilber Lin MD;  [facility]     BRONCHOSCOPY, DILATE BRONCHUS, STENT BRONCHUS, COMBINED Right 7/10/2018    Procedure: COMBINED BRONCHOSCOPY, DILATE BRONCHUS, STENT BRONCHUS;  Flexible Bronchoscopy, Balloon Dilation, Bronchial Washings  ;  Surgeon: Wilber Lin MD;  [facility]     BRONCHOSCOPY, DILATE BRONCHUS, STENT BRONCHUS,  COMBINED N/A 8/2/2018    Procedure: COMBINED BRONCHOSCOPY, DILATE BRONCHUS, STENT BRONCHUS;  Flexible Bronchoscopy, Bronchial Washings, Balloon Dilation;  Surgeon: Wilber Lin MD;  [facility]     BRONCHOSCOPY, DILATE BRONCHUS, STENT BRONCHUS, COMBINED N/A 8/20/2018    Procedure: COMBINED BRONCHOSCOPY, DILATE BRONCHUS, STENT BRONCHUS;  Flexible Bronchoscopy, Balloon Dilation;  Surgeon: Wilber Lin MD;  [facility]     BRONCHOSCOPY, DILATE BRONCHUS, STENT BRONCHUS, COMBINED N/A 10/29/2018    Procedure: Flexible Bronchoscopy, Balloon Dilation, Stent Revision, Airway Examination And Therapeutic Suctioning, Cyro Tumor Debulking;  Surgeon: Wilber Lin MD;  [facility]     BRONCHOSCOPY, DILATE BRONCHUS, STENT BRONCHUS, COMBINED N/A 11/20/2018    Procedure: Rigid Bronchoscopy, Stent Removal and dilitation;  Surgeon: Wilber Lin MD;  [facility]     BRONCHOSCOPY, DILATE BRONCHUS, STENT BRONCHUS, COMBINED N/A 12/14/2018    Procedure: Flexible And Rigid Bronchoscopy, Balloon Dilation, Bronchial Washing;  Surgeon: Wilber Lin MD;  [facility]     BRONCHOSCOPY, DILATE BRONCHUS, STENT BRONCHUS, COMBINED N/A 1/17/2019    Procedure: Flexible And Rigid Bronchoscopy, Balloon Dilation.;  Surgeon: Wilber Lin MD;  [facility]     BRONCHOSCOPY, DILATE BRONCHUS, STENT BRONCHUS, COMBINED N/A 3/7/2019    Procedure: Flexible and Rigid Bronchoscopy, Bronchial Washing, Balloon Dilation;  Surgeon: Wilber Lin MD;  [facility]     BRONCHOSCOPY, DILATE BRONCHUS, STENT BRONCHUS, COMBINED N/A 6/6/2019    Procedure: Rigid and Flexible Bronchoscopy, Balloon Dilation;  Surgeon: Wilber Lin MD;  [facility]     BRONCHOSCOPY, DILATE BRONCHUS, STENT BRONCHUS, COMBINED N/A 10/11/2019    Procedure: Flexible and Rigid Bronchoscopy, Balloon Dilation, Bronchoalveolar Lagave;  Surgeon: Wilber Lin MD;  [facility]      BRONCHOSCOPY, DILATE BRONCHUS, STENT BRONCHUS, COMBINED N/A 2/19/2021    Procedure: BRONCHOSCOPY, flexible, airway dilation, and bronchial wash;  Surgeon: Wilber Lin MD;  [facility]     BRONCHOSCOPY, DILATE BRONCHUS, STENT BRONCHUS, COMBINED N/A 4/9/2021    Procedure: BRONCHOSCOPY, flexible and rigid, Airway suctioning;  Surgeon: Mati Norris MD;  [facility]     CV RIGHT HEART CATH MEASUREMENTS RECORDED N/A 3/10/2020    Procedure: CV RIGHT HEART CATH;  Surgeon: Wai Garcia MD;  [facility]     ENT SURGERY      tonsillectomy as a child     ESOPHAGOSCOPY, GASTROSCOPY, DUODENOSCOPY (EGD), COMBINED N/A 10/29/2018    Procedure: COMBINED ESOPHAGOSCOPY, GASTROSCOPY, DUODENOSCOPY (EGD) with biopsies and polypectomy;  Surgeon: Chente Bloom MD;  [facility]     INSERT EXTRACORPORAL MEMBRANE OXYGENATOR ADULT (OUTSIDE OR) N/A 2/27/2018    Procedure: INSERT EXTRACORPORAL MEMBRANE OXYGENATOR ADULT (OUTSIDE OR);  INSERT EXTRACORPORAL MEMBRANE OXYGENATOR ADULT (OUTSIDE OR) ;  Surgeon: Toby Hernandez MD;  [facility]     IR CVC TUNNEL PLACEMENT > 5 YRS OF AGE  10/25/2019     IR DIALYSIS FISTULOGRAM LEFT  3/2/2021     IR DIALYSIS MECH THROMB, PTA  3/2/2021     IR FLUORO 0-1 HOUR  5/7/2021     IR GASTRO JEJUNOSTOMY TUBE PLACEMENT  2/16/2021     IR PARACENTESIS  1/8/2020     IR THORACENTESIS  9/13/2019     IR TRANSCATHETER BIOPSY  1/8/2020     LASER CO2 BRONCHOSCOPY N/A 4/30/2021    Procedure: Flexible and Rigid Bronchoscopy and Tissue Debulking with CO2 Laser Assistance;  Surgeon: Mati Norris MD;  [facility]     LASER CO2 BRONCHOSCOPY N/A 6/11/2021    Procedure: BRONCHOSCOPY, Flexible and Rigid Bronchoscopy, Tissue Debulking with cryo Assistance, airway dilation,;  Surgeon: Mati Norris MD;  [facility]     no prior surgery       REMOVE EXTRACORPORAL MEMBRANE OXYGENATOR ADULT N/A 3/3/2018    Procedure: REMOVE EXTRACORPORAL  MEMBRANE OXYGENATOR ADULT;  Removal of Right Femoral Venous and Right Axillary Arterial Extracorporeal Membrane Oxygenator;  Surgeon: Toby Hernandez MD;  [facility]     TRANSPLANT LUNG RECIPIENT SINGLE X2 Bilateral 3/1/2018    Procedure: TRANSPLANT LUNG RECIPIENT SINGLE X2;  Median Sternotomy, Extracorporeal Membrane Oxygenator, bilateral sequential lung transplant;  Surgeon: Toby Hernandez MD;  [facility]           Family History:     Family History   Problem Relation Age of Onset     Hypertension Mother      Arthritis Mother      Pancreatic Cancer Father      Diabetes Father             Social History:     Social History     Socioeconomic History     Marital status:      Spouse name: Not on file     Number of children: Not on file     Years of education: Not on file     Highest education level: Not on file   Occupational History     Not on file   Tobacco Use     Smoking status: Never Smoker     Smokeless tobacco: Never Used   Substance and Sexual Activity     Alcohol use: No     Alcohol/week: 1.0 standard drinks     Types: 1 Glasses of wine per week     Drug use: No     Sexual activity: Not on file   Other Topics Concern     Parent/sibling w/ CABG, MI or angioplasty before 65F 55M? No   Social History Narrative    3/6/2019 - Lives with . Has three daughters. Four grandchildren (two ). No pets. Travelled previously to [facility]. Has visited [place] several times.      Social Determinants of Health     Financial Resource Strain:      Difficulty of Paying Living Expenses:    Food Insecurity:      Worried About Running Out of Food in the Last Year:      Ran Out of Food in the Last Year:    Transportation Needs:      Lack of Transportation (Medical):      Lack of Transportation (Non-Medical):    Physical Activity:      Days of Exercise per Week:      Minutes of Exercise per Session:    Stress:      Feeling of Stress :    Social Connections:      Frequency of  "Communication with Friends and Family:      Frequency of Social Gatherings with Friends and Family:      Attends Jew Services:      Active Member of Clubs or Organizations:      Attends Club or Organization Meetings:      Marital Status:    Intimate Partner Violence:      Fear of Current or Ex-Partner:      Emotionally Abused:      Physically Abused:      Sexually Abused:             Medications:     Current Outpatient Medications   Medication     acetaminophen (TYLENOL) 325 MG tablet     albuterol (PROVENTIL) (2.5 MG/3ML) 0.083% neb solution     azaTHIOprine (IMURAN) 50 MG tablet     budesonide (PULMICORT) 0.5 MG/2ML neb solution     Magnesium Cl-Calcium Carbonate (SLOW-MAG) 71.5-119 MG TBEC     metoprolol tartrate (LOPRESSOR) 25 MG tablet     multivitamin RENAL (RENAVITE RX/NEPHROVITE) 1 MG tablet     ondansetron (ZOFRAN-ODT) 4 MG ODT tab     pantoprazole (PROTONIX) 40 MG EC tablet     posaconazole (NOXAFIL) 100 MG EC tablet     predniSONE (DELTASONE) 2.5 MG tablet     predniSONE (DELTASONE) 5 MG tablet     sulfamethoxazole-trimethoprim (BACTRIM) 400-80 MG tablet     tacrolimus (GENERIC EQUIVALENT) 0.5 MG capsule     tacrolimus (GENERIC EQUIVALENT) 1 MG capsule     No current facility-administered medications for this visit.            Physical Exam:   BP (!) 155/84 (BP Location: Right arm, Patient Position: Chair, Cuff Size: Adult Regular)   Pulse 74   Ht 1.6 m (5' 3\")   Wt 56.2 kg (124 lb)   LMP 06/07/2014 (Exact Date)   SpO2 97%   BMI 21.97 kg/m      GENERAL: alert, NAD  HEENT: NCAT, EOMI, no scleral icterus, oral mucosa moist and without lesions  Neck: no cervical or supraclavicular adenopathy  Lungs: moderate airflow, scattered expiratory wheezing with very few fine crackles  CV: RRR, S1S2, no murmurs noted  Abdomen: normoactive BS, soft, non tender   Lymph: no edema  Neuro: AAO X 3, CN 2-12 grossly intact  Psychiatric: normal affect, good eye contact  Skin: no rash, jaundice or lesions on limited " exam         Data:   All laboratory and imaging data reviewed.      Recent Results (from the past 168 hour(s))   Asymptomatic COVID-19 Virus (Coronavirus) by PCR Nose    Collection Time: 09/15/21  6:23 AM    Specimen: Nose; Swab   Result Value Ref Range    SARS CoV2 PCR Negative Negative   General PFT Lab (Please always keep checked)    Collection Time: 09/15/21  7:00 AM   Result Value Ref Range    FVC-Pred 3.23 L    FVC-Pre 1.52 L    FVC-%Pred-Pre 47 %    FEV1-Pre 1.29 L    FEV1-%Pred-Pre 50 %    FEV1FVC-Pred 80 %    FEV1FVC-Pre 85 %    FEFMax-Pred 6.40 L/sec    FEFMax-Pre 3.37 L/sec    FEFMax-%Pred-Pre 52 %    FEF2575-Pred 2.36 L/sec    FEF2575-Pre 1.28 L/sec    DCH0172-%Pred-Pre 54 %    ExpTime-Pre 5.46 sec    FIFMax-Pre 2.67 L/sec    FEV1FEV6-Pred 81 %    FEV1FEV6-Pre 85 %   Hepatic panel    Collection Time: 09/15/21  9:15 AM   Result Value Ref Range    Bilirubin Total 0.6 0.2 - 1.3 mg/dL    Bilirubin Direct 0.3 (H) 0.0 - 0.2 mg/dL    Protein Total 7.5 6.8 - 8.8 g/dL    Albumin 3.1 (L) 3.4 - 5.0 g/dL    Alkaline Phosphatase 323 (H) 40 - 150 U/L    AST 30 0 - 45 U/L    ALT 84 (H) 0 - 50 U/L   CBC with platelets    Collection Time: 09/15/21  9:15 AM   Result Value Ref Range    WBC Count 8.3 4.0 - 11.0 10e3/uL    RBC Count 3.63 (L) 3.80 - 5.20 10e6/uL    Hemoglobin 11.0 (L) 11.7 - 15.7 g/dL    Hematocrit 35.1 35.0 - 47.0 %    MCV 97 78 - 100 fL    MCH 30.3 26.5 - 33.0 pg    MCHC 31.3 (L) 31.5 - 36.5 g/dL    RDW 15.1 (H) 10.0 - 15.0 %    Platelet Count 213 150 - 450 10e3/uL   Basic metabolic panel    Collection Time: 09/15/21  9:15 AM   Result Value Ref Range    Sodium 136 133 - 144 mmol/L    Potassium 4.8 3.4 - 5.3 mmol/L    Chloride 98 94 - 109 mmol/L    Carbon Dioxide (CO2) 26 20 - 32 mmol/L    Anion Gap 12 3 - 14 mmol/L    Urea Nitrogen 79 (H) 7 - 30 mg/dL    Creatinine 5.63 (H) 0.52 - 1.04 mg/dL    Calcium 8.6 8.5 - 10.1 mg/dL    Glucose 152 (H) 70 - 99 mg/dL    GFR Estimate 8 (L) >60 mL/min/1.73m2   INR     Collection Time: 09/15/21  9:15 AM   Result Value Ref Range    INR 0.96 0.85 - 1.15   Lipid Profile    Collection Time: 09/15/21  9:15 AM   Result Value Ref Range    Cholesterol 176 <200 mg/dL    Triglycerides 68 <150 mg/dL    Direct Measure HDL 76 >=50 mg/dL    LDL Cholesterol Calculated 86 <=100 mg/dL    Non HDL Cholesterol 100 <130 mg/dL    Patient Fasting > 8hrs? No    Hemoglobin A1c    Collection Time: 09/15/21  9:15 AM   Result Value Ref Range    Hemoglobin A1C 5.8 (H) 0.0 - 5.6 %   Tacrolimus level    Collection Time: 09/15/21  9:15 AM   Result Value Ref Range    Tacrolimus 8.9 5.0 - 15.0 ug/L    Tacrolimus Last Dose Date 9/14/2021     Tacrolimus Last Dose Time  9:00 PM      PFT interpretation:  Maneuver: valid, but did not meet ATS guidelines

## 2021-09-15 ENCOUNTER — LAB (OUTPATIENT)
Dept: LAB | Facility: CLINIC | Age: 61
End: 2021-09-15
Attending: INTERNAL MEDICINE
Payer: COMMERCIAL

## 2021-09-15 ENCOUNTER — LAB (OUTPATIENT)
Dept: LAB | Facility: CLINIC | Age: 61
End: 2021-09-15
Payer: COMMERCIAL

## 2021-09-15 ENCOUNTER — OFFICE VISIT (OUTPATIENT)
Dept: PULMONOLOGY | Facility: CLINIC | Age: 61
End: 2021-09-15
Attending: PHYSICIAN ASSISTANT
Payer: COMMERCIAL

## 2021-09-15 ENCOUNTER — ANCILLARY PROCEDURE (OUTPATIENT)
Dept: CT IMAGING | Facility: CLINIC | Age: 61
End: 2021-09-15
Attending: INTERNAL MEDICINE
Payer: COMMERCIAL

## 2021-09-15 VITALS
HEIGHT: 63 IN | DIASTOLIC BLOOD PRESSURE: 84 MMHG | OXYGEN SATURATION: 97 % | SYSTOLIC BLOOD PRESSURE: 155 MMHG | BODY MASS INDEX: 21.97 KG/M2 | WEIGHT: 124 LBS | HEART RATE: 74 BPM

## 2021-09-15 DIAGNOSIS — Z94.2 S/P LUNG TRANSPLANT (H): ICD-10-CM

## 2021-09-15 DIAGNOSIS — R79.89 ELEVATED LIVER FUNCTION TESTS: ICD-10-CM

## 2021-09-15 DIAGNOSIS — Z11.59 ENCOUNTER FOR SCREENING FOR OTHER VIRAL DISEASES: ICD-10-CM

## 2021-09-15 DIAGNOSIS — Z94.2 S/P LUNG TRANSPLANT (H): Primary | ICD-10-CM

## 2021-09-15 LAB
ALBUMIN SERPL-MCNC: 3.1 G/DL (ref 3.4–5)
ALP SERPL-CCNC: 323 U/L (ref 40–150)
ALT SERPL W P-5'-P-CCNC: 84 U/L (ref 0–50)
ANION GAP SERPL CALCULATED.3IONS-SCNC: 12 MMOL/L (ref 3–14)
AST SERPL W P-5'-P-CCNC: 30 U/L (ref 0–45)
BILIRUB DIRECT SERPL-MCNC: 0.3 MG/DL (ref 0–0.2)
BILIRUB SERPL-MCNC: 0.6 MG/DL (ref 0.2–1.3)
BUN SERPL-MCNC: 79 MG/DL (ref 7–30)
CALCIUM SERPL-MCNC: 8.6 MG/DL (ref 8.5–10.1)
CHLORIDE BLD-SCNC: 98 MMOL/L (ref 94–109)
CHOLEST SERPL-MCNC: 176 MG/DL
CO2 SERPL-SCNC: 26 MMOL/L (ref 20–32)
CREAT SERPL-MCNC: 5.63 MG/DL (ref 0.52–1.04)
ERYTHROCYTE [DISTWIDTH] IN BLOOD BY AUTOMATED COUNT: 15.1 % (ref 10–15)
EXPTIME-PRE: 5.46 SEC
FASTING STATUS PATIENT QL REPORTED: NO
FEF2575-%PRED-PRE: 54 %
FEF2575-PRE: 1.28 L/SEC
FEF2575-PRED: 2.36 L/SEC
FEFMAX-%PRED-PRE: 52 %
FEFMAX-PRE: 3.37 L/SEC
FEFMAX-PRED: 6.4 L/SEC
FEV1-%PRED-PRE: 50 %
FEV1-PRE: 1.29 L
FEV1FEV6-PRE: 85 %
FEV1FEV6-PRED: 81 %
FEV1FVC-PRE: 85 %
FEV1FVC-PRED: 80 %
FIFMAX-PRE: 2.67 L/SEC
FVC-%PRED-PRE: 47 %
FVC-PRE: 1.52 L
FVC-PRED: 3.23 L
GFR SERPL CREATININE-BSD FRML MDRD: 8 ML/MIN/1.73M2
GLUCOSE BLD-MCNC: 152 MG/DL (ref 70–99)
HBA1C MFR BLD: 5.8 % (ref 0–5.6)
HCT VFR BLD AUTO: 35.1 % (ref 35–47)
HDLC SERPL-MCNC: 76 MG/DL
HGB BLD-MCNC: 11 G/DL (ref 11.7–15.7)
INR PPP: 0.96 (ref 0.85–1.15)
LDLC SERPL CALC-MCNC: 86 MG/DL
MCH RBC QN AUTO: 30.3 PG (ref 26.5–33)
MCHC RBC AUTO-ENTMCNC: 31.3 G/DL (ref 31.5–36.5)
MCV RBC AUTO: 97 FL (ref 78–100)
NONHDLC SERPL-MCNC: 100 MG/DL
PLATELET # BLD AUTO: 213 10E3/UL (ref 150–450)
POTASSIUM BLD-SCNC: 4.8 MMOL/L (ref 3.4–5.3)
PROT SERPL-MCNC: 7.5 G/DL (ref 6.8–8.8)
RBC # BLD AUTO: 3.63 10E6/UL (ref 3.8–5.2)
SARS-COV-2 RNA RESP QL NAA+PROBE: NEGATIVE
SODIUM SERPL-SCNC: 136 MMOL/L (ref 133–144)
TACROLIMUS BLD-MCNC: 8.9 UG/L (ref 5–15)
TME LAST DOSE: NORMAL H
TME LAST DOSE: NORMAL H
TRIGL SERPL-MCNC: 68 MG/DL
WBC # BLD AUTO: 8.3 10E3/UL (ref 4–11)

## 2021-09-15 PROCEDURE — 85027 COMPLETE CBC AUTOMATED: CPT | Performed by: PATHOLOGY

## 2021-09-15 PROCEDURE — 80061 LIPID PANEL: CPT | Performed by: PATHOLOGY

## 2021-09-15 PROCEDURE — 71250 CT THORAX DX C-: CPT | Performed by: RADIOLOGY

## 2021-09-15 PROCEDURE — U0003 INFECTIOUS AGENT DETECTION BY NUCLEIC ACID (DNA OR RNA); SEVERE ACUTE RESPIRATORY SYNDROME CORONAVIRUS 2 (SARS-COV-2) (CORONAVIRUS DISEASE [COVID-19]), AMPLIFIED PROBE TECHNIQUE, MAKING USE OF HIGH THROUGHPUT TECHNOLOGIES AS DESCRIBED BY CMS-2020-01-R: HCPCS | Mod: 90 | Performed by: PATHOLOGY

## 2021-09-15 PROCEDURE — 85610 PROTHROMBIN TIME: CPT | Performed by: PATHOLOGY

## 2021-09-15 PROCEDURE — 99214 OFFICE O/P EST MOD 30 MIN: CPT | Mod: 25 | Performed by: PHYSICIAN ASSISTANT

## 2021-09-15 PROCEDURE — 82248 BILIRUBIN DIRECT: CPT | Performed by: PATHOLOGY

## 2021-09-15 PROCEDURE — U0005 INFEC AGEN DETEC AMPLI PROBE: HCPCS | Mod: 90 | Performed by: PATHOLOGY

## 2021-09-15 PROCEDURE — 82784 ASSAY IGA/IGD/IGG/IGM EACH: CPT | Mod: 90 | Performed by: PATHOLOGY

## 2021-09-15 PROCEDURE — 87799 DETECT AGENT NOS DNA QUANT: CPT | Mod: 90 | Performed by: PATHOLOGY

## 2021-09-15 PROCEDURE — 87449 NOS EACH ORGANISM AG IA: CPT | Mod: 90 | Performed by: PATHOLOGY

## 2021-09-15 PROCEDURE — 82306 VITAMIN D 25 HYDROXY: CPT | Mod: 90 | Performed by: PATHOLOGY

## 2021-09-15 PROCEDURE — 84080 ASSAY ALKALINE PHOSPHATASES: CPT | Mod: 90 | Performed by: PATHOLOGY

## 2021-09-15 PROCEDURE — 36415 COLL VENOUS BLD VENIPUNCTURE: CPT | Performed by: PATHOLOGY

## 2021-09-15 PROCEDURE — 94375 RESPIRATORY FLOW VOLUME LOOP: CPT | Performed by: PHYSICIAN ASSISTANT

## 2021-09-15 PROCEDURE — 83036 HEMOGLOBIN GLYCOSYLATED A1C: CPT | Performed by: PATHOLOGY

## 2021-09-15 PROCEDURE — 80053 COMPREHEN METABOLIC PANEL: CPT | Performed by: PATHOLOGY

## 2021-09-15 PROCEDURE — G0463 HOSPITAL OUTPT CLINIC VISIT: HCPCS | Mod: 25

## 2021-09-15 PROCEDURE — 86832 HLA CLASS I HIGH DEFIN QUAL: CPT | Performed by: PATHOLOGY

## 2021-09-15 PROCEDURE — 80197 ASSAY OF TACROLIMUS: CPT | Mod: 90 | Performed by: PATHOLOGY

## 2021-09-15 PROCEDURE — 86833 HLA CLASS II HIGH DEFIN QUAL: CPT | Performed by: PATHOLOGY

## 2021-09-15 RX ORDER — AZATHIOPRINE 50 MG/1
25 TABLET ORAL DAILY
Qty: 25 TABLET | Status: ON HOLD | COMMUNITY
Start: 2021-09-15 | End: 2021-09-22

## 2021-09-15 ASSESSMENT — MIFFLIN-ST. JEOR: SCORE: 1111.59

## 2021-09-15 NOTE — NURSING NOTE
Chief Complaint   Patient presents with     Follow Up     LUNG Follow Up TXP     Vitals were taken and medications were reconciled.     Cecille Reed RMA  11:19 AM

## 2021-09-15 NOTE — LETTER
9/15/2021         RE: Kecia Blue  [address] [place] [address]        Dear Colleague,    Thank you for referring your patient, Kecia Blue, to the [facility] [place]. Please see a copy of my visit note below.    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 This is a routine follow up prior to her bronch in the OR tomorrow.      1. Bilateral lung transplant: no new pulmonary complaints, notes her morning cough has actually improved from prior. Sating 97% on room air. DSA on 5/1/ negative. CT reviewed today and discussed with radiology and demonstrates maybe slight increase in LLL opacities, RLL relatively stable. PFT didn't meet ATS guidelines today, but did improve from prior.   - Continue IS, restart AZA at low dose of 25 mg daily (has been on hold since 2019 for leukopenia per review of notes), continue tacrolimus (goal 8-10) and prednisone   - On Bactrim 3 times/week    2. Right main bronchial stenosis s/p ligation: scheduled for bronch in the OR tomorrow. Symptomatically stable.   - Continue albuterol and pulmicort BID     3. Congestive hepatopathy with fibrosis  New onset ascites: secondary to portal hypertension. Chronic elevation of alk phos. Fluid optimization with dialysis. Recently seen by Dr. Denise.     4. H/o CMV viremia (D+/R+): VGCV ppx with steroid burst, completed 2/23. Last CMV on 9/1/ negative.      5. EBV viremia: level 38, 186 *low 4.6) on 5/1, down from prior values.  - Added on EBV today    6.  Left-sided aspergillus empyema: noted 10/8/19. CT scan on 7/17/20 with increased mass-like density, likely pleural-based, in LLL area s/p needle aspiration (8/13/20) with Aspergillus fumigatus on cultures s/p intrapleural bead placement (amphoterecin, 11/20). S/p chest tube drainage in May.   - Continue posaconazole  - BD glucan and IgG added on today  - Will schedule f/u with zaira Kaur    7. ESRD on iHD (since 10/2019): access is AVF with partial graft. Continues now on iHD M/W/F. Transplant evaluation on hold for ongoing treatment of empyema. Follows with Nephrology    8. HTN: BP is elevated, but Kecia just took her meds.   - Continue metoprolol    Chronic issues:  1. Paroxysmal AFib  2. Hypomagnesemia  3. Dermatomyositis with Raynauds    RTC: TBD bronch  Annuals:   Preventative: will get third covid vaccine on Friday, flu shot October and colonoscopy 2022    Ame Cohw PA-C  Pulmonary, Allergy, Critical Care and Sleep Medicine        Interval History:     Feeling good, can live life, besides dialysis. No fever or chills, breathing is good, no shortness of breath, does have a cough in the am, much improved over prior, productive at times. No congestion or tightness. No chest pain or palpitations. No GI complaints, stools are stable, no diarrhea.           Review of Systems:   Please see HPI, otherwise the complete 10 point ROS is negative.           Past Medical and Surgical History:     Past Medical History:   Diagnosis Date     Acute on chronic respiratory failure with hypoxia (H) 02/21/2018     Anisocoria      Antisynthetase syndrome (H) 2014     Anxiety      Aspergillus (H)      Aspergillus pneumonia (H) 11/20/2020     Benign essential hypertension      C. difficile colitis      Cytomegalovirus (CMV) viremia (H)      Dermatomyositis (H)      Dysplasia of cervix, low grade (ESTRADA 1)      EBV (Franklin-Barr virus) viremia      ESRD (end stage renal disease) on dialysis (H)      ILD (interstitial lung  disease) (H)      Lung replaced by transplant (H)      Osteopenia      Paroxysmal atrial fibrillation (H)      Pneumocystis jiroveci pneumonia (H)      PONV (postoperative nausea and vomiting)      Post-menopause      Pulmonary hypertension (H)      Raynaud's disease      Seronegative rheumatoid arthritis (H)      Past Surgical History:   Procedure Laterality Date     BRONCHOSCOPY (RIGID OR FLEXIBLE), DIAGNOSTIC N/A 4/10/2018    Procedure: COMBINED BRONCHOSCOPY (RIGID OR FLEXIBLE), LAVAGE;;  Surgeon: Mariposa Donohue MD;  Location: [facility]     BRONCHOSCOPY (RIGID OR FLEXIBLE), DIAGNOSTIC N/A 12/23/2020    Procedure: BRONCHOSCOPY, WITH BRONCHOALVEOLAR LAVAGE;  Surgeon: Uri Bass MD;  Location: [facility]     BRONCHOSCOPY (RIGID OR FLEXIBLE), DILATE BRONCHUS / TRACHEA N/A 10/11/2018    Procedure: BRONCHOSCOPY (RIGID OR FLEXIBLE), DILATE BRONCHUS / TRACHEA;  Flexible And Rigid Bronchoscopy and Dilation;  Surgeon: Wilber Lin MD;  [facility]     BRONCHOSCOPY FLEXIBLE N/A 3/13/2018    Procedure: BRONCHOSCOPY FLEXIBLE;  Flexible Bronchoscopy ;  Surgeon: Gissell Sanchez MD;  Location: [facility]     BRONCHOSCOPY FLEXIBLE N/A 5/9/2018    Procedure: BRONCHOSCOPY FLEXIBLE;;  Surgeon: Wilber Lin MD;  [facility]: [facility]     BRONCHOSCOPY FLEXIBLE AND RIGID N/A 9/10/2018    Procedure: BRONCHOSCOPY FLEXIBLE AND RIGID;  Flexible and Rigid Bronchoscopy with Balloon Dilation, tissue debulking with cryo, and Right mainstem bronchus stent placement;  Surgeon: Wilber Lin MD;  [facility]     BRONCHOSCOPY RIGID N/A 6/6/2018    Procedure: BRONCHOSCOPY RIGID;;  Surgeon: Lopez Macias MD;  [facility]: [facility]     BRONCHOSCOPY, DILATE BRONCHUS, STENT BRONCHUS, COMBINED N/A 6/11/2018    Procedure: COMBINED BRONCHOSCOPY, DILATE BRONCHUS, STENT BRONCHUS;  Flexible Bronchoscopy, Balloon Dilation, Bronchial Washings;  Surgeon: Wilber Lin MD;  [facility]     BRONCHOSCOPY, DILATE  BRONCHUS, STENT BRONCHUS, COMBINED Right 7/10/2018    Procedure: COMBINED BRONCHOSCOPY, DILATE BRONCHUS, STENT BRONCHUS;  Flexible Bronchoscopy, Balloon Dilation, Bronchial Washings  ;  Surgeon: Wilber Lin MD;  [facility]     BRONCHOSCOPY, DILATE BRONCHUS, STENT BRONCHUS, COMBINED N/A 8/2/2018    Procedure: COMBINED BRONCHOSCOPY, DILATE BRONCHUS, STENT BRONCHUS;  Flexible Bronchoscopy, Bronchial Washings, Balloon Dilation;  Surgeon: Wliber Lin MD;  [facility]     BRONCHOSCOPY, DILATE BRONCHUS, STENT BRONCHUS, COMBINED N/A 8/20/2018    Procedure: COMBINED BRONCHOSCOPY, DILATE BRONCHUS, STENT BRONCHUS;  Flexible Bronchoscopy, Balloon Dilation;  Surgeon: Wilber Lin MD;  [facility]     BRONCHOSCOPY, DILATE BRONCHUS, STENT BRONCHUS, COMBINED N/A 10/29/2018    Procedure: Flexible Bronchoscopy, Balloon Dilation, Stent Revision, Airway Examination And Therapeutic Suctioning, Cyro Tumor Debulking;  Surgeon: Wilber Lin MD;  [facility]     BRONCHOSCOPY, DILATE BRONCHUS, STENT BRONCHUS, COMBINED N/A 11/20/2018    Procedure: Rigid Bronchoscopy, Stent Removal and dilitation;  Surgeon: Wilber Lin MD;  [facility]     BRONCHOSCOPY, DILATE BRONCHUS, STENT BRONCHUS, COMBINED N/A 12/14/2018    Procedure: Flexible And Rigid Bronchoscopy, Balloon Dilation, Bronchial Washing;  Surgeon: Wilber Lin MD;  [facility]     BRONCHOSCOPY, DILATE BRONCHUS, STENT BRONCHUS, COMBINED N/A 1/17/2019    Procedure: Flexible And Rigid Bronchoscopy, Balloon Dilation.;  Surgeon: Wilber Lin MD;  [facility]     BRONCHOSCOPY, DILATE BRONCHUS, STENT BRONCHUS, COMBINED N/A 3/7/2019    Procedure: Flexible and Rigid Bronchoscopy, Bronchial Washing, Balloon Dilation;  Surgeon: Wilber Lin MD;  [facility]     BRONCHOSCOPY, DILATE BRONCHUS, STENT BRONCHUS, COMBINED N/A 6/6/2019    Procedure: Rigid and Flexible Bronchoscopy, Balloon  Dilation;  Surgeon: Wilber Lin MD;  [facility]     BRONCHOSCOPY, DILATE BRONCHUS, STENT BRONCHUS, COMBINED N/A 10/11/2019    Procedure: Flexible and Rigid Bronchoscopy, Balloon Dilation, Bronchoalveolar Lagave;  Surgeon: Wilber Lin MD;  [facility]     BRONCHOSCOPY, DILATE BRONCHUS, STENT BRONCHUS, COMBINED N/A 2/19/2021    Procedure: BRONCHOSCOPY, flexible, airway dilation, and bronchial wash;  Surgeon: Wilber Lin MD;  [facility]     BRONCHOSCOPY, DILATE BRONCHUS, STENT BRONCHUS, COMBINED N/A 4/9/2021    Procedure: BRONCHOSCOPY, flexible and rigid, Airway suctioning;  Surgeon: Mati Norris MD;  [facility]     CV RIGHT HEART CATH MEASUREMENTS RECORDED N/A 3/10/2020    Procedure: CV RIGHT HEART CATH;  Surgeon: Wai Garcia MD;  [facility]     ENT SURGERY      tonsillectomy as a child     ESOPHAGOSCOPY, GASTROSCOPY, DUODENOSCOPY (EGD), COMBINED N/A 10/29/2018    Procedure: COMBINED ESOPHAGOSCOPY, GASTROSCOPY, DUODENOSCOPY (EGD) with biopsies and polypectomy;  Surgeon: Chente Bloom MD;  [facility]     INSERT EXTRACORPORAL MEMBRANE OXYGENATOR ADULT (OUTSIDE OR) N/A 2/27/2018    Procedure: INSERT EXTRACORPORAL MEMBRANE OXYGENATOR ADULT (OUTSIDE OR);  INSERT EXTRACORPORAL MEMBRANE OXYGENATOR ADULT (OUTSIDE OR) ;  Surgeon: Toby Hernandez MD;  [facility]     IR CVC TUNNEL PLACEMENT > 5 YRS OF AGE  10/25/2019     IR DIALYSIS FISTULOGRAM LEFT  3/2/2021     IR DIALYSIS MECH THROMB, PTA  3/2/2021     IR FLUORO 0-1 HOUR  5/7/2021     IR GASTRO JEJUNOSTOMY TUBE PLACEMENT  2/16/2021     IR PARACENTESIS  1/8/2020     IR THORACENTESIS  9/13/2019     IR TRANSCATHETER BIOPSY  1/8/2020     LASER CO2 BRONCHOSCOPY N/A 4/30/2021    Procedure: Flexible and Rigid Bronchoscopy and Tissue Debulking with CO2 Laser Assistance;  Surgeon: Mati Norris MD;  [facility]     LASER CO2 BRONCHOSCOPY N/A 6/11/2021     Procedure: BRONCHOSCOPY, Flexible and Rigid Bronchoscopy, Tissue Debulking with cryo Assistance, airway dilation,;  Surgeon: Mati Norris MD;  Location:  OR     no prior surgery       REMOVE EXTRACORPORAL MEMBRANE OXYGENATOR ADULT N/A 3/3/2018    Procedure: REMOVE EXTRACORPORAL MEMBRANE OXYGENATOR ADULT;  Removal of Right Femoral Venous and Right Axillary Arterial Extracorporeal Membrane Oxygenator;  Surgeon: Toby Hernandez MD;  [facility]     TRANSPLANT LUNG RECIPIENT SINGLE X2 Bilateral 3/1/2018    Procedure: TRANSPLANT LUNG RECIPIENT SINGLE X2;  Median Sternotomy, Extracorporeal Membrane Oxygenator, bilateral sequential lung transplant;  Surgeon: Toby Hernandez MD;  Location:  OR           Family History:     Family History   Problem Relation Age of Onset     Hypertension Mother      Arthritis Mother      Pancreatic Cancer Father      Diabetes Father             Social History:     Social History     Socioeconomic History     Marital status:      Spouse name: Not on file     Number of children: Not on file     Years of education: Not on file     Highest education level: Not on file   Occupational History     Not on file   Tobacco Use     Smoking status: Never Smoker     Smokeless tobacco: Never Used   Substance and Sexual Activity     Alcohol use: No     Alcohol/week: 1.0 standard drinks     Types: 1 Glasses of wine per week     Drug use: No     Sexual activity: Not on file   Other Topics Concern     Parent/sibling w/ CABG, MI or angioplasty before 65F 55M? No   Social History Narrative    3/6/2019 - Lives with . Has three daughters. Four grandchildren (two ). No pets. Travelled previously to [facility] [place]. Has visited [place] several times.      Social Determinants of Health     Financial Resource Strain:      Difficulty of Paying Living Expenses:    Food Insecurity:      Worried About Running Out of Food in the Last Year:      Ran Out of Food in the  "Last Year:    Transportation Needs:      Lack of Transportation (Medical):      Lack of Transportation (Non-Medical):    Physical Activity:      Days of Exercise per Week:      Minutes of Exercise per Session:    Stress:      Feeling of Stress :    Social Connections:      Frequency of Communication with Friends and Family:      Frequency of Social Gatherings with Friends and Family:      Attends Zoroastrianism Services:      Active Member of Clubs or Organizations:      Attends Club or Organization Meetings:      Marital Status:    Intimate Partner Violence:      Fear of Current or Ex-Partner:      Emotionally Abused:      Physically Abused:      Sexually Abused:             Medications:     Current Outpatient Medications   Medication     acetaminophen (TYLENOL) 325 MG tablet     albuterol (PROVENTIL) (2.5 MG/3ML) 0.083% neb solution     azaTHIOprine (IMURAN) 50 MG tablet     budesonide (PULMICORT) 0.5 MG/2ML neb solution     Magnesium Cl-Calcium Carbonate (SLOW-MAG) 71.5-119 MG TBEC     metoprolol tartrate (LOPRESSOR) 25 MG tablet     multivitamin RENAL (RENAVITE RX/NEPHROVITE) 1 MG tablet     ondansetron (ZOFRAN-ODT) 4 MG ODT tab     pantoprazole (PROTONIX) 40 MG EC tablet     posaconazole (NOXAFIL) 100 MG EC tablet     predniSONE (DELTASONE) 2.5 MG tablet     predniSONE (DELTASONE) 5 MG tablet     sulfamethoxazole-trimethoprim (BACTRIM) 400-80 MG tablet     tacrolimus (GENERIC EQUIVALENT) 0.5 MG capsule     tacrolimus (GENERIC EQUIVALENT) 1 MG capsule     No current facility-administered medications for this visit.            Physical Exam:   BP (!) 155/84 (BP Location: Right arm, Patient Position: Chair, Cuff Size: Adult Regular)   Pulse 74   Ht 1.6 m (5' 3\")   Wt 56.2 kg (124 lb)   LMP 06/07/2014 (Exact Date)   SpO2 97%   BMI 21.97 kg/m      GENERAL: alert, NAD  HEENT: NCAT, EOMI, no scleral icterus, oral mucosa moist and without lesions  Neck: no cervical or supraclavicular adenopathy  Lungs: moderate " airflow, scattered expiratory wheezing with very few fine crackles  CV: RRR, S1S2, no murmurs noted  Abdomen: normoactive BS, soft, non tender   Lymph: no edema  Neuro: AAO X 3, CN 2-12 grossly intact  Psychiatric: normal affect, good eye contact  Skin: no rash, jaundice or lesions on limited exam         Data:   All laboratory and imaging data reviewed.      Recent Results (from the past 168 hour(s))   Asymptomatic COVID-19 Virus (Coronavirus) by PCR Nose    Collection Time: 09/15/21  6:23 AM    Specimen: Nose; Swab   Result Value Ref Range    SARS CoV2 PCR Negative Negative   General PFT Lab (Please always keep checked)    Collection Time: 09/15/21  7:00 AM   Result Value Ref Range    FVC-Pred 3.23 L    FVC-Pre 1.52 L    FVC-%Pred-Pre 47 %    FEV1-Pre 1.29 L    FEV1-%Pred-Pre 50 %    FEV1FVC-Pred 80 %    FEV1FVC-Pre 85 %    FEFMax-Pred 6.40 L/sec    FEFMax-Pre 3.37 L/sec    FEFMax-%Pred-Pre 52 %    FEF2575-Pred 2.36 L/sec    FEF2575-Pre 1.28 L/sec    HWG8253-%Pred-Pre 54 %    ExpTime-Pre 5.46 sec    FIFMax-Pre 2.67 L/sec    FEV1FEV6-Pred 81 %    FEV1FEV6-Pre 85 %   Hepatic panel    Collection Time: 09/15/21  9:15 AM   Result Value Ref Range    Bilirubin Total 0.6 0.2 - 1.3 mg/dL    Bilirubin Direct 0.3 (H) 0.0 - 0.2 mg/dL    Protein Total 7.5 6.8 - 8.8 g/dL    Albumin 3.1 (L) 3.4 - 5.0 g/dL    Alkaline Phosphatase 323 (H) 40 - 150 U/L    AST 30 0 - 45 U/L    ALT 84 (H) 0 - 50 U/L   CBC with platelets    Collection Time: 09/15/21  9:15 AM   Result Value Ref Range    WBC Count 8.3 4.0 - 11.0 10e3/uL    RBC Count 3.63 (L) 3.80 - 5.20 10e6/uL    Hemoglobin 11.0 (L) 11.7 - 15.7 g/dL    Hematocrit 35.1 35.0 - 47.0 %    MCV 97 78 - 100 fL    MCH 30.3 26.5 - 33.0 pg    MCHC 31.3 (L) 31.5 - 36.5 g/dL    RDW 15.1 (H) 10.0 - 15.0 %    Platelet Count 213 150 - 450 10e3/uL   Basic metabolic panel    Collection Time: 09/15/21  9:15 AM   Result Value Ref Range    Sodium 136 133 - 144 mmol/L    Potassium 4.8 3.4 - 5.3 mmol/L     Chloride 98 94 - 109 mmol/L    Carbon Dioxide (CO2) 26 20 - 32 mmol/L    Anion Gap 12 3 - 14 mmol/L    Urea Nitrogen 79 (H) 7 - 30 mg/dL    Creatinine 5.63 (H) 0.52 - 1.04 mg/dL    Calcium 8.6 8.5 - 10.1 mg/dL    Glucose 152 (H) 70 - 99 mg/dL    GFR Estimate 8 (L) >60 mL/min/1.73m2   INR    Collection Time: 09/15/21  9:15 AM   Result Value Ref Range    INR 0.96 0.85 - 1.15   Lipid Profile    Collection Time: 09/15/21  9:15 AM   Result Value Ref Range    Cholesterol 176 <200 mg/dL    Triglycerides 68 <150 mg/dL    Direct Measure HDL 76 >=50 mg/dL    LDL Cholesterol Calculated 86 <=100 mg/dL    Non HDL Cholesterol 100 <130 mg/dL    Patient Fasting > 8hrs? No    Hemoglobin A1c    Collection Time: 09/15/21  9:15 AM   Result Value Ref Range    Hemoglobin A1C 5.8 (H) 0.0 - 5.6 %   Tacrolimus level    Collection Time: 09/15/21  9:15 AM   Result Value Ref Range    Tacrolimus 8.9 5.0 - 15.0 ug/L    Tacrolimus Last Dose Date 9/14/2021     Tacrolimus Last Dose Time  9:00 PM      PFT interpretation:  Maneuver: valid, but did not meet ATS guidelines    Transplant Coordinator Note    Reason for visit: Post lung transplant follow up visit   Coordinator: Present   Caregiver:  Caregiver     Health concerns addressed today:  1. OR bronch tomorrow   2. Continue to watch LFTs  3. Dialysis 3 times weekly.   4. Chest CT today- reviewed by Ame. Fluid in LLL and inflammation noted.    5. Follow up with ID.     Activity/rehab: Pt independent   Oxygen needs: none   Pain management/RX: tylenol for pain  Diabetic management: NA   Next Bronch due: 9/15/21 in the bronch.     COVID:  1. COVID-19 infection (yes/no, date of most recent positive test):   2. Status/instructions given about COVID-19 vaccine:     Pt Education: medications (use/dose/side effects), how/when to call coordinator, frequency of labs, s/s of infection/rejection, call prior to starting any new medications, lab/vital sign book    Health Maintenance:     Last colonoscopy:      Next colonoscopy due:     Dermatology:    Vaccinations this visit:     Labs, CXR, PFTs reviewed with patient  Medication record reviewed and reconciled  Questions and concerns addressed    Patient Instructions  1. Continue to hydrate with 60-70 oz fluids daily.  2. Continue to exercise daily or most days of the week.  3. Follow up with your primary care provider for annual gender health maintenance procedures.  4. Follow up with colonoscopy schedule.  5. Follow up with annual dermatology visits.  6. It doesn't seem like the COVID vaccine is working well in lung transplant patients. A number of lung transplant patients have gotten sick with COVID even after receiving the vaccines.  Based on our recent experience, it can be life-threatening to get COVID  even after being vaccinated. Please continue to act like you did not get the COVID vaccine - social distancing, wearing a mask, good hand hygiene, etc. If the people around you are vaccinated, it will help reduce the risk of you getting COVID. All members of your household should be vaccinated.  7. Have your flu shot in October.   8. Have your COVID vaccine on Friday.   9. Follow up with infectious disease. Dr. Layton.   10. Start Azathioprine 25 mg daily.     Next transplant clinic appointment:  To be determined after OR bronch with CXR, labs and PFTs        AVS printed at time of check out            Again, thank you for allowing me to participate in the care of your patient.        Sincerely,        Ame Chow PA-C

## 2021-09-15 NOTE — PATIENT INSTRUCTIONS
Patient Instructions  1. Continue to hydrate with 60-70 oz fluids daily.  2. Continue to exercise daily or most days of the week.  3. Follow up with your primary care provider for annual gender health maintenance procedures.  4. Follow up with colonoscopy schedule.  5. Follow up with annual dermatology visits.  6. It doesn't seem like the COVID vaccine is working well in lung transplant patients. A number of lung transplant patients have gotten sick with COVID even after receiving the vaccines.  Based on our recent experience, it can be life-threatening to get COVID  even after being vaccinated. Please continue to act like you did not get the COVID vaccine - social distancing, wearing a mask, good hand hygiene, etc. If the people around you are vaccinated, it will help reduce the risk of you getting COVID. All members of your household should be vaccinated.  7. Have your flu shot in October.   8. Have your COVID vaccine on Friday.   9. Follow up with infectious disease. Dr. Layton.   10. Start Azathioprine 25 mg daily.     Next transplant clinic appointment:  To be determined after OR bronch with CXR, labs and PFTs        AVS printed at time of check out          ~~~~~~~~~~~~~~~~~~~~~~~~~    Thoracic Transplant Office phone [phone], fax [phone]    Office Hours 8:30 - 5:00     For after-hours urgent issues, please dial [phone], and ask to speak with the Thoracic Transplant Coordinator  On-Call, pager [phone].  --------------------  To expedite your medication refill(s), please contact your pharmacy and have them fax a refill request to: [phone]  .   *Please allow 3 business days for routine medication refills.  *Please allow 5 business days for controlled substance medication refills.    **For Diabetic medications and supplies refill(s), please contact your pharmacy and have them  Contact your Endocrine team.  --------------------  For scheduling appointments call  [phone].  --------------------  Please Note: If you are active on [employer], all future test results will be sent by [employer] message only, and will no longer be called to patient. You may also receive communication directly from your physician.

## 2021-09-15 NOTE — PROGRESS NOTES
Transplant Coordinator Note    Reason for visit: Post lung transplant follow up visit   Coordinator: Present   Caregiver:  Caregiver     Health concerns addressed today:  1. OR bronch tomorrow   2. Continue to watch LFTs  3. Dialysis 3 times weekly.   4. Chest CT today- reviewed by Ame. Fluid in LLL and inflammation noted.    5. Follow up with ID.     Activity/rehab: Pt independent   Oxygen needs: none   Pain management/RX: tylenol for pain  Diabetic management: NA   Next Bronch due: 9/15/21 in the bronch.     COVID:  1. COVID-19 infection (yes/no, date of most recent positive test):   2. Status/instructions given about COVID-19 vaccine:     Pt Education: medications (use/dose/side effects), how/when to call coordinator, frequency of labs, s/s of infection/rejection, call prior to starting any new medications, lab/vital sign book    Health Maintenance:     Last colonoscopy:     Next colonoscopy due:     Dermatology:    Vaccinations this visit:     Labs, CXR, PFTs reviewed with patient  Medication record reviewed and reconciled  Questions and concerns addressed    Patient Instructions  1. Continue to hydrate with 60-70 oz fluids daily.  2. Continue to exercise daily or most days of the week.  3. Follow up with your primary care provider for annual gender health maintenance procedures.  4. Follow up with colonoscopy schedule.  5. Follow up with annual dermatology visits.  6. It doesn't seem like the COVID vaccine is working well in lung transplant patients. A number of lung transplant patients have gotten sick with COVID even after receiving the vaccines.  Based on our recent experience, it can be life-threatening to get COVID  even after being vaccinated. Please continue to act like you did not get the COVID vaccine - social distancing, wearing a mask, good hand hygiene, etc. If the people around you are vaccinated, it will help reduce the risk of you getting COVID. All members of your household should be  vaccinated.  7. Have your flu shot in October.   8. Have your COVID vaccine on Friday.   9. Follow up with infectious disease. Dr. Layton.   10. Start Azathioprine 25 mg daily.     Next transplant clinic appointment:  To be determined after OR bronch with CXR, labs and PFTs        AVS printed at time of check out

## 2021-09-15 NOTE — RESULT ENCOUNTER NOTE
Tacrolimus level 8.9 at 12 hours, on 9/15/2021.  Goal 8-10.   Current dose 1 mg in AM, 0.5 mg in PM    Level at goal, no change in dose.   [employer] message sent

## 2021-09-16 ENCOUNTER — ANESTHESIA EVENT (OUTPATIENT)
Dept: SURGERY | Facility: CLINIC | Age: 61
End: 2021-09-16
Payer: COMMERCIAL

## 2021-09-16 ENCOUNTER — HOSPITAL ENCOUNTER (OUTPATIENT)
Facility: CLINIC | Age: 61
Discharge: HOME | End: 2021-09-16
Attending: INTERNAL MEDICINE | Admitting: INTERNAL MEDICINE
Payer: COMMERCIAL

## 2021-09-16 ENCOUNTER — ANESTHESIA (OUTPATIENT)
Dept: SURGERY | Facility: CLINIC | Age: 61
End: 2021-09-16
Payer: COMMERCIAL

## 2021-09-16 ENCOUNTER — APPOINTMENT (OUTPATIENT)
Dept: GENERAL RADIOLOGY | Facility: CLINIC | Age: 61
End: 2021-09-16
Attending: INTERNAL MEDICINE
Payer: COMMERCIAL

## 2021-09-16 VITALS
RESPIRATION RATE: 16 BRPM | HEART RATE: 88 BPM | TEMPERATURE: 98.1 F | DIASTOLIC BLOOD PRESSURE: 68 MMHG | OXYGEN SATURATION: 96 % | SYSTOLIC BLOOD PRESSURE: 118 MMHG | BODY MASS INDEX: 21.95 KG/M2 | HEIGHT: 63 IN | WEIGHT: 123.9 LBS

## 2021-09-16 DIAGNOSIS — Z94.2 LUNG REPLACED BY TRANSPLANT (H): Primary | ICD-10-CM

## 2021-09-16 DIAGNOSIS — J98.09 BRONCHIAL STENOSIS, RIGHT: ICD-10-CM

## 2021-09-16 LAB
DEPRECATED CALCIDIOL+CALCIFEROL SERPL-MC: 38 UG/L (ref 20–75)
DONOR IDENTIFICATION: NORMAL
DSA COMMENTS: NORMAL
DSA PRESENT: NO
DSA TEST METHOD: NORMAL
EBV DNA COPIES/ML, INSTRUMENT: ABNORMAL COPIES/ML
EBV DNA SPEC NAA+PROBE-LOG#: 4.7 {LOG_COPIES}/ML
GLUCOSE BLDC GLUCOMTR-MCNC: 61 MG/DL (ref 70–99)
IGG SERPL-MCNC: 1198 MG/DL (ref 610–1616)
ORGAN: NORMAL
POTASSIUM BLD-SCNC: 4.3 MMOL/L (ref 3.4–5.3)
SA 1 CELL: NORMAL
SA 1 TEST METHOD: NORMAL
SA 2 CELL: NORMAL
SA 2 TEST METHOD: NORMAL
SA1 HI RISK ABY: NORMAL
SA1 MOD RISK ABY: NORMAL
SA2 HI RISK ABY: NORMAL
SA2 MOD RISK ABY: NORMAL
UNACCEPTABLE ANTIGENS: NORMAL
UNOS CPRA: 0
ZZZSA 1  COMMENTS: NORMAL
ZZZSA 2 COMMENTS: NORMAL

## 2021-09-16 PROCEDURE — 31641 BRONCHOSCOPY TREAT BLOCKAGE: CPT | Mod: GC | Performed by: INTERNAL MEDICINE

## 2021-09-16 PROCEDURE — 255N000002 HC RX 255 OP 636: Performed by: INTERNAL MEDICINE

## 2021-09-16 PROCEDURE — 36415 COLL VENOUS BLD VENIPUNCTURE: CPT | Performed by: STUDENT IN AN ORGANIZED HEALTH CARE EDUCATION/TRAINING PROGRAM

## 2021-09-16 PROCEDURE — 999N000141 HC STATISTIC PRE-PROCEDURE NURSING ASSESSMENT: Performed by: INTERNAL MEDICINE

## 2021-09-16 PROCEDURE — 999N000179 XR SURGERY CARM FLUORO LESS THAN 5 MIN W STILLS: Mod: TC

## 2021-09-16 PROCEDURE — 258N000003 HC RX IP 258 OP 636: Performed by: STUDENT IN AN ORGANIZED HEALTH CARE EDUCATION/TRAINING PROGRAM

## 2021-09-16 PROCEDURE — 710N000010 HC RECOVERY PHASE 1, LEVEL 2, PER MIN: Performed by: INTERNAL MEDICINE

## 2021-09-16 PROCEDURE — 710N000012 HC RECOVERY PHASE 2, PER MINUTE: Performed by: INTERNAL MEDICINE

## 2021-09-16 PROCEDURE — 250N000009 HC RX 250: Performed by: STUDENT IN AN ORGANIZED HEALTH CARE EDUCATION/TRAINING PROGRAM

## 2021-09-16 PROCEDURE — 370N000017 HC ANESTHESIA TECHNICAL FEE, PER MIN: Performed by: INTERNAL MEDICINE

## 2021-09-16 PROCEDURE — 250N000011 HC RX IP 250 OP 636: Performed by: NURSE ANESTHETIST, CERTIFIED REGISTERED

## 2021-09-16 PROCEDURE — 360N000084 HC SURGERY LEVEL 4 W/ FLUORO, PER MIN: Performed by: INTERNAL MEDICINE

## 2021-09-16 PROCEDURE — 84132 ASSAY OF SERUM POTASSIUM: CPT | Performed by: STUDENT IN AN ORGANIZED HEALTH CARE EDUCATION/TRAINING PROGRAM

## 2021-09-16 PROCEDURE — 250N000009 HC RX 250: Performed by: NURSE ANESTHETIST, CERTIFIED REGISTERED

## 2021-09-16 RX ORDER — IOPAMIDOL 408 MG/ML
INJECTION, SOLUTION INTRAVASCULAR PRN
Status: DISCONTINUED | OUTPATIENT
Start: 2021-09-16 | End: 2021-09-16 | Stop reason: HOSPADM

## 2021-09-16 RX ORDER — PROPOFOL 10 MG/ML
INJECTION, EMULSION INTRAVENOUS CONTINUOUS PRN
Status: DISCONTINUED | OUTPATIENT
Start: 2021-09-16 | End: 2021-09-16

## 2021-09-16 RX ORDER — ESMOLOL HYDROCHLORIDE 10 MG/ML
INJECTION INTRAVENOUS PRN
Status: DISCONTINUED | OUTPATIENT
Start: 2021-09-16 | End: 2021-09-16

## 2021-09-16 RX ORDER — ONDANSETRON 4 MG/1
4 TABLET, ORALLY DISINTEGRATING ORAL EVERY 30 MIN PRN
Status: DISCONTINUED | OUTPATIENT
Start: 2021-09-16 | End: 2021-09-16 | Stop reason: HOSPADM

## 2021-09-16 RX ORDER — FENTANYL CITRATE 50 UG/ML
INJECTION, SOLUTION INTRAMUSCULAR; INTRAVENOUS PRN
Status: DISCONTINUED | OUTPATIENT
Start: 2021-09-16 | End: 2021-09-16

## 2021-09-16 RX ORDER — SODIUM CHLORIDE, SODIUM LACTATE, POTASSIUM CHLORIDE, CALCIUM CHLORIDE 600; 310; 30; 20 MG/100ML; MG/100ML; MG/100ML; MG/100ML
INJECTION, SOLUTION INTRAVENOUS CONTINUOUS
Status: DISCONTINUED | OUTPATIENT
Start: 2021-09-16 | End: 2021-09-16 | Stop reason: HOSPADM

## 2021-09-16 RX ORDER — ONDANSETRON 2 MG/ML
INJECTION INTRAMUSCULAR; INTRAVENOUS PRN
Status: DISCONTINUED | OUTPATIENT
Start: 2021-09-16 | End: 2021-09-16

## 2021-09-16 RX ORDER — LIDOCAINE 40 MG/G
CREAM TOPICAL
Status: DISCONTINUED | OUTPATIENT
Start: 2021-09-16 | End: 2021-09-16 | Stop reason: HOSPADM

## 2021-09-16 RX ORDER — HYDROMORPHONE HCL IN WATER/PF 6 MG/30 ML
0.2 PATIENT CONTROLLED ANALGESIA SYRINGE INTRAVENOUS EVERY 5 MIN PRN
Status: DISCONTINUED | OUTPATIENT
Start: 2021-09-16 | End: 2021-09-16 | Stop reason: HOSPADM

## 2021-09-16 RX ORDER — LABETALOL HYDROCHLORIDE 5 MG/ML
10 INJECTION, SOLUTION INTRAVENOUS
Status: DISCONTINUED | OUTPATIENT
Start: 2021-09-16 | End: 2021-09-16 | Stop reason: HOSPADM

## 2021-09-16 RX ORDER — IPRATROPIUM BROMIDE AND ALBUTEROL SULFATE 2.5; .5 MG/3ML; MG/3ML
3 SOLUTION RESPIRATORY (INHALATION) ONCE
Status: COMPLETED | OUTPATIENT
Start: 2021-09-16 | End: 2021-09-16

## 2021-09-16 RX ORDER — ONDANSETRON 2 MG/ML
4 INJECTION INTRAMUSCULAR; INTRAVENOUS EVERY 30 MIN PRN
Status: DISCONTINUED | OUTPATIENT
Start: 2021-09-16 | End: 2021-09-16 | Stop reason: HOSPADM

## 2021-09-16 RX ORDER — PROPOFOL 10 MG/ML
INJECTION, EMULSION INTRAVENOUS PRN
Status: DISCONTINUED | OUTPATIENT
Start: 2021-09-16 | End: 2021-09-16

## 2021-09-16 RX ORDER — LIDOCAINE HYDROCHLORIDE 20 MG/ML
INJECTION, SOLUTION INFILTRATION; PERINEURAL PRN
Status: DISCONTINUED | OUTPATIENT
Start: 2021-09-16 | End: 2021-09-16

## 2021-09-16 RX ORDER — FENTANYL CITRATE 50 UG/ML
25 INJECTION, SOLUTION INTRAMUSCULAR; INTRAVENOUS EVERY 5 MIN PRN
Status: DISCONTINUED | OUTPATIENT
Start: 2021-09-16 | End: 2021-09-16 | Stop reason: HOSPADM

## 2021-09-16 RX ORDER — DEXAMETHASONE SODIUM PHOSPHATE 4 MG/ML
INJECTION, SOLUTION INTRA-ARTICULAR; INTRALESIONAL; INTRAMUSCULAR; INTRAVENOUS; SOFT TISSUE PRN
Status: DISCONTINUED | OUTPATIENT
Start: 2021-09-16 | End: 2021-09-16

## 2021-09-16 RX ADMIN — PROPOFOL 120 MG: 10 INJECTION, EMULSION INTRAVENOUS at 09:08

## 2021-09-16 RX ADMIN — ROCURONIUM BROMIDE 20 MG: 10 INJECTION INTRAVENOUS at 09:18

## 2021-09-16 RX ADMIN — ROCURONIUM BROMIDE 40 MG: 10 INJECTION INTRAVENOUS at 09:08

## 2021-09-16 RX ADMIN — PROPOFOL 200 MCG/KG/MIN: 10 INJECTION, EMULSION INTRAVENOUS at 09:07

## 2021-09-16 RX ADMIN — ESMOLOL HYDROCHLORIDE 30 MG: 10 INJECTION, SOLUTION INTRAVENOUS at 09:36

## 2021-09-16 RX ADMIN — LIDOCAINE HYDROCHLORIDE 60 MG: 20 INJECTION, SOLUTION INFILTRATION; PERINEURAL at 09:07

## 2021-09-16 RX ADMIN — IPRATROPIUM BROMIDE AND ALBUTEROL SULFATE 3 ML: .5; 3 SOLUTION RESPIRATORY (INHALATION) at 08:27

## 2021-09-16 RX ADMIN — ESMOLOL HYDROCHLORIDE 30 MG: 10 INJECTION, SOLUTION INTRAVENOUS at 09:25

## 2021-09-16 RX ADMIN — FENTANYL CITRATE 50 MCG: 50 INJECTION, SOLUTION INTRAMUSCULAR; INTRAVENOUS at 09:23

## 2021-09-16 RX ADMIN — FENTANYL CITRATE 100 MCG: 50 INJECTION, SOLUTION INTRAMUSCULAR; INTRAVENOUS at 09:07

## 2021-09-16 RX ADMIN — DEXAMETHASONE SODIUM PHOSPHATE 10 MG: 4 INJECTION, SOLUTION INTRA-ARTICULAR; INTRALESIONAL; INTRAMUSCULAR; INTRAVENOUS; SOFT TISSUE at 09:23

## 2021-09-16 RX ADMIN — ONDANSETRON 4 MG: 2 INJECTION INTRAMUSCULAR; INTRAVENOUS at 09:36

## 2021-09-16 RX ADMIN — SODIUM CHLORIDE, POTASSIUM CHLORIDE, SODIUM LACTATE AND CALCIUM CHLORIDE: 600; 310; 30; 20 INJECTION, SOLUTION INTRAVENOUS at 09:07

## 2021-09-16 RX ADMIN — SUGAMMADEX 200 MG: 100 INJECTION, SOLUTION INTRAVENOUS at 09:43

## 2021-09-16 ASSESSMENT — MIFFLIN-ST. JEOR: SCORE: 1111.13

## 2021-09-16 NOTE — ANESTHESIA PREPROCEDURE EVALUATION
Anesthesia Pre-Procedure Evaluation    Patient: Kecia Blue   MRN: [id number] : 1960        Preoperative Diagnosis: Bronchial stenosis, right [J98.09]   Procedure : Procedure(s):  BRONCHOSCOPY, flexible and rigid, possible tissue debulking, possible airway dilation, possible stent placement with CO2 laser     Past Medical History:   Diagnosis Date     Acute on chronic respiratory failure with hypoxia (H) 2018     Anisocoria      Antisynthetase syndrome (H)      Anxiety      Aspergillus (H)      Aspergillus pneumonia (H) 2020     Benign essential hypertension      C. difficile colitis      Cytomegalovirus (CMV) viremia (H)      Dermatomyositis (H)      Dysplasia of cervix, low grade (ESTRADA 1)      EBV (Franklin-Barr virus) viremia      ESRD (end stage renal disease) on dialysis (H)      ILD (interstitial lung disease) (H)      Lung replaced by transplant (H)      Osteopenia      Paroxysmal atrial fibrillation (H)      Pneumocystis jiroveci pneumonia (H)      PONV (postoperative nausea and vomiting)      Post-menopause      Pulmonary hypertension (H)      Raynaud's disease      Seronegative rheumatoid arthritis (H)       Past Surgical History:   Procedure Laterality Date     BRONCHOSCOPY (RIGID OR FLEXIBLE), DIAGNOSTIC N/A 4/10/2018    Procedure: COMBINED BRONCHOSCOPY (RIGID OR FLEXIBLE), LAVAGE;;  Surgeon: Mariposa Donohue MD;  Location: [facility]     BRONCHOSCOPY (RIGID OR FLEXIBLE), DIAGNOSTIC N/A 2020    Procedure: BRONCHOSCOPY, WITH BRONCHOALVEOLAR LAVAGE;  Surgeon: Uri Bass MD;  Location: [facility]     BRONCHOSCOPY (RIGID OR FLEXIBLE), DILATE BRONCHUS / TRACHEA N/A 10/11/2018    Procedure: BRONCHOSCOPY (RIGID OR FLEXIBLE), DILATE BRONCHUS / TRACHEA;  Flexible And Rigid Bronchoscopy and Dilation;  Surgeon: Wilber Lin MD;  [facility]     BRONCHOSCOPY FLEXIBLE N/A 3/13/2018    Procedure: BRONCHOSCOPY FLEXIBLE;  Flexible Bronchoscopy ;  Surgeon: Gissell Sanchez  MD Sarah;  Location: [facility]     BRONCHOSCOPY FLEXIBLE N/A 5/9/2018    Procedure: BRONCHOSCOPY FLEXIBLE;;  Surgeon: Wilber Lin MD;  [facility]: [facility]     BRONCHOSCOPY FLEXIBLE AND RIGID N/A 9/10/2018    Procedure: BRONCHOSCOPY FLEXIBLE AND RIGID;  Flexible and Rigid Bronchoscopy with Balloon Dilation, tissue debulking with cryo, and Right mainstem bronchus stent placement;  Surgeon: Wilber Lin MD;  [facility]     BRONCHOSCOPY RIGID N/A 6/6/2018    Procedure: BRONCHOSCOPY RIGID;;  Surgeon: Lopez Macias MD;  [facility]: [facility]     BRONCHOSCOPY, DILATE BRONCHUS, STENT BRONCHUS, COMBINED N/A 6/11/2018    Procedure: COMBINED BRONCHOSCOPY, DILATE BRONCHUS, STENT BRONCHUS;  Flexible Bronchoscopy, Balloon Dilation, Bronchial Washings;  Surgeon: Wilber Lin MD;  [facility]     BRONCHOSCOPY, DILATE BRONCHUS, STENT BRONCHUS, COMBINED Right 7/10/2018    Procedure: COMBINED BRONCHOSCOPY, DILATE BRONCHUS, STENT BRONCHUS;  Flexible Bronchoscopy, Balloon Dilation, Bronchial Washings  ;  Surgeon: Wilber Lin MD;  [facility]     BRONCHOSCOPY, DILATE BRONCHUS, STENT BRONCHUS, COMBINED N/A 8/2/2018    Procedure: COMBINED BRONCHOSCOPY, DILATE BRONCHUS, STENT BRONCHUS;  Flexible Bronchoscopy, Bronchial Washings, Balloon Dilation;  Surgeon: Wilber Lin MD;  [facility]     BRONCHOSCOPY, DILATE BRONCHUS, STENT BRONCHUS, COMBINED N/A 8/20/2018    Procedure: COMBINED BRONCHOSCOPY, DILATE BRONCHUS, STENT BRONCHUS;  Flexible Bronchoscopy, Balloon Dilation;  Surgeon: Wilber Lin MD;  [facility]     BRONCHOSCOPY, DILATE BRONCHUS, STENT BRONCHUS, COMBINED N/A 10/29/2018    Procedure: Flexible Bronchoscopy, Balloon Dilation, Stent Revision, Airway Examination And Therapeutic Suctioning, Cyro Tumor Debulking;  Surgeon: Wilber Lin MD;  [facility]     BRONCHOSCOPY, DILATE BRONCHUS, STENT BRONCHUS, COMBINED N/A 11/20/2018    Procedure:  Rigid Bronchoscopy, Stent Removal and dilitation;  Surgeon: Wilber Lin MD;  [facility]     BRONCHOSCOPY, DILATE BRONCHUS, STENT BRONCHUS, COMBINED N/A 12/14/2018    Procedure: Flexible And Rigid Bronchoscopy, Balloon Dilation, Bronchial Washing;  Surgeon: Wilber Lin MD;  [facility]     BRONCHOSCOPY, DILATE BRONCHUS, STENT BRONCHUS, COMBINED N/A 1/17/2019    Procedure: Flexible And Rigid Bronchoscopy, Balloon Dilation.;  Surgeon: Wilber Lin MD;  [facility]     BRONCHOSCOPY, DILATE BRONCHUS, STENT BRONCHUS, COMBINED N/A 3/7/2019    Procedure: Flexible and Rigid Bronchoscopy, Bronchial Washing, Balloon Dilation;  Surgeon: Wilber Lin MD;  [facility]     BRONCHOSCOPY, DILATE BRONCHUS, STENT BRONCHUS, COMBINED N/A 6/6/2019    Procedure: Rigid and Flexible Bronchoscopy, Balloon Dilation;  Surgeon: Wilber Lin MD;  [facility]     BRONCHOSCOPY, DILATE BRONCHUS, STENT BRONCHUS, COMBINED N/A 10/11/2019    Procedure: Flexible and Rigid Bronchoscopy, Balloon Dilation, Bronchoalveolar Lagave;  Surgeon: Wilebr Lin MD;  [facility]     BRONCHOSCOPY, DILATE BRONCHUS, STENT BRONCHUS, COMBINED N/A 2/19/2021    Procedure: BRONCHOSCOPY, flexible, airway dilation, and bronchial wash;  Surgeon: Wilber Lin MD;  [facility]     BRONCHOSCOPY, DILATE BRONCHUS, STENT BRONCHUS, COMBINED N/A 4/9/2021    Procedure: BRONCHOSCOPY, flexible and rigid, Airway suctioning;  Surgeon: Mati Norris MD;  [facility]     CV RIGHT HEART CATH MEASUREMENTS RECORDED N/A 3/10/2020    Procedure: CV RIGHT HEART CATH;  Surgeon: Wai Garcia MD;  [facility]     ENT SURGERY      tonsillectomy as a child     ESOPHAGOSCOPY, GASTROSCOPY, DUODENOSCOPY (EGD), COMBINED N/A 10/29/2018    Procedure: COMBINED ESOPHAGOSCOPY, GASTROSCOPY, DUODENOSCOPY (EGD) with biopsies and polypectomy;  Surgeon: Chente Bloom,  MD;  [facility]     INSERT EXTRACORPORAL MEMBRANE OXYGENATOR ADULT (OUTSIDE OR) N/A 2/27/2018    Procedure: INSERT EXTRACORPORAL MEMBRANE OXYGENATOR ADULT (OUTSIDE OR);  INSERT EXTRACORPORAL MEMBRANE OXYGENATOR ADULT (OUTSIDE OR) ;  Surgeon: Toby Hernandez MD;  [facility]     IR CVC TUNNEL PLACEMENT > 5 YRS OF AGE  10/25/2019     IR DIALYSIS FISTULOGRAM LEFT  3/2/2021     IR DIALYSIS MECH THROMB, PTA  3/2/2021     IR FLUORO 0-1 HOUR  5/7/2021     IR GASTRO JEJUNOSTOMY TUBE PLACEMENT  2/16/2021     IR PARACENTESIS  1/8/2020     IR THORACENTESIS  9/13/2019     IR TRANSCATHETER BIOPSY  1/8/2020     LASER CO2 BRONCHOSCOPY N/A 4/30/2021    Procedure: Flexible and Rigid Bronchoscopy and Tissue Debulking with CO2 Laser Assistance;  Surgeon: Mati Norris MD;  [facility]     LASER CO2 BRONCHOSCOPY N/A 6/11/2021    Procedure: BRONCHOSCOPY, Flexible and Rigid Bronchoscopy, Tissue Debulking with cryo Assistance, airway dilation,;  Surgeon: Mati Norris MD;  [facility]     no prior surgery       REMOVE EXTRACORPORAL MEMBRANE OXYGENATOR ADULT N/A 3/3/2018    Procedure: REMOVE EXTRACORPORAL MEMBRANE OXYGENATOR ADULT;  Removal of Right Femoral Venous and Right Axillary Arterial Extracorporeal Membrane Oxygenator;  Surgeon: Toby Hernandez MD;  [facility]     TRANSPLANT LUNG RECIPIENT SINGLE X2 Bilateral 3/1/2018    Procedure: TRANSPLANT LUNG RECIPIENT SINGLE X2;  Median Sternotomy, Extracorporeal Membrane Oxygenator, bilateral sequential lung transplant;  Surgeon: Toby Hernandez MD;  [facility]      No Known Allergies   Social History     Tobacco Use     Smoking status: Never Smoker     Smokeless tobacco: Never Used   Substance Use Topics     Alcohol use: No     Alcohol/week: 1.0 standard drinks     Types: 1 Glasses of wine per week      Wt Readings from Last 1 Encounters:   09/15/21 56.2 kg (124 lb)        Anesthesia Evaluation   Pt has had prior anesthetic.  Type: MAC and General.    History of anesthetic complications  - PONV.      ROS/MED HX  ENT/Pulmonary: Comment: Pt s/p bilateral lung transplant for interstitial lung disease, R bronchial mainstem stenosis requiring dilation/stent, severe restriction on PFTs       Neurologic:       Cardiovascular: Comment: H/o DVT, paroxysmal Afib, SVT, now SR, pulm HTN, EF 60-65%, no CP, palpitations    (+) hypertension-----pulmonary hypertension,     METS/Exercise Tolerance:     Hematologic:       Musculoskeletal: Comment: Raynaud's, RA, antisynthetase syndrome      GI/Hepatic: Comment: New onset ascites, portal HTN, follows with hepatology      Renal/Genitourinary:     (+) renal disease, type: ESRD, Pt requires dialysis, type: Hemodialysis,     Endo:       Psychiatric/Substance Use:     (+) psychiatric history anxiety     Infectious Disease:       Malignancy:       Other:               OUTSIDE LABS:  CBC:   Lab Results   Component Value Date    WBC 8.3 09/15/2021    WBC 7.5 05/02/2021    HGB 11.0 (L) 09/15/2021    HGB 10.6 (L) 05/02/2021    HCT 35.1 09/15/2021    HCT 33.8 (L) 05/02/2021     09/15/2021     05/02/2021     BMP:   Lab Results   Component Value Date     09/15/2021     05/07/2021    POTASSIUM 4.8 09/15/2021    POTASSIUM 4.3 05/07/2021    CHLORIDE 98 09/15/2021    CHLORIDE 102 05/07/2021    CO2 26 09/15/2021    CO2 23 05/07/2021    BUN 79 (H) 09/15/2021    BUN 66 (H) 05/07/2021    CR 5.63 (H) 09/15/2021    CR 3.90 (H) 05/07/2021     (H) 09/15/2021     (H) 05/07/2021     COAGS:   Lab Results   Component Value Date    PTT 28 02/12/2021    INR 0.96 09/15/2021    FIBR 358 02/11/2021     POC:   Lab Results   Component Value Date    BGM 75 06/11/2021    HCG Negative 06/06/2019     HEPATIC:   Lab Results   Component Value Date    ALBUMIN 3.1 (L) 09/15/2021    PROTTOTAL 7.5 09/15/2021    ALT 84 (H) 09/15/2021    AST 30 09/15/2021     (H) 11/11/2019    ALKPHOS 323 (H) 09/15/2021     BILITOTAL 0.6 09/15/2021    SALAS <10 (L) 01/24/2021     OTHER:   Lab Results   Component Value Date    PH 7.41 02/10/2021    LACT 0.5 (L) 02/09/2021    A1C 5.8 (H) 09/15/2021    CHELSEY 8.6 09/15/2021    PHOS 5.9 (H) 05/03/2021    MAG 1.7 04/30/2021    LIPASE 212 10/24/2019    AMYLASE 58 02/19/2018    TSH 1.80 08/01/2018    CRP 25.0 (H) 10/06/2019    SED 93 (H) 10/07/2019       Anesthesia Plan    ASA Status:  3   NPO Status:  NPO Appropriate    Anesthesia Type: General.   Induction: Intravenous.   Maintenance: TIVA.   Techniques and Equipment:     - Airway: Jet ventilation         Consents    Anesthesia Plan(s) and associated risks, benefits, and realistic alternatives discussed. Questions answered and patient/representative(s) expressed understanding.     - Discussed with:  Patient         Postoperative Care    Pain management: IV analgesics, Oral pain medications.   PONV prophylaxis: Ondansetron (or other 5HT-3), Dexamethasone or Solumedrol, Background Propofol Infusion     Comments:         H&P reviewed: Unable to attach H&P to encounter due to EHR limitations. H&P Update: appropriate H&P reviewed, patient examined. No interval changes since H&P (within 30 days).         Tatiana Osuna MD

## 2021-09-16 NOTE — ANESTHESIA POSTPROCEDURE EVALUATION
Patient: Kecia Blue    Procedure(s):  BRONCHOSCOPY, flexible and rigid, CO2 Laser Debulking    Diagnosis:Bronchial stenosis, right [J98.09]  Diagnosis Additional Information: No value filed.    Anesthesia Type:  General    Note:  Disposition: Outpatient   Postop Pain Control: Uneventful            Sign Out: Well controlled pain   PONV: No   Neuro/Psych: Uneventful            Sign Out: Acceptable/Baseline neuro status   Airway/Respiratory: Uneventful            Sign Out: Acceptable/Baseline resp. status   CV/Hemodynamics: Uneventful            Sign Out: Acceptable CV status; No obvious hypovolemia; No obvious fluid overload   Other NRE: NONE   DID A NON-ROUTINE EVENT OCCUR? No           Last vitals:  Vitals Value Taken Time   /71 09/16/21 1045   Temp 37  C (98.6  F) 09/16/21 1030   Pulse 92 09/16/21 1047   Resp 19 09/16/21 1030   SpO2 96 % 09/16/21 1047   Vitals shown include unvalidated device data.    Electronically Signed By: Tatiana Osuna MD  September 16, 2021  10:49 AM

## 2021-09-16 NOTE — DISCHARGE INSTRUCTIONS
Post Bronchoscopy Patient Instructions:    September 16, 2021  Kecia Blue    Your procedure completed (bronchoscopy with rigid bronchoscopy laser tissue debulking ) without any immediate complications.  You may cough up scant amount of blood for the next 12-24 hours. If you have excessive cough with blood, chest pain, shortness of breath, please report to the closest emergency room.    You may experience low grade (less than 100.5 F) fever next 24 hours, if so, you can take Tylenol. If the fever persists more than 24 hours, please contact to our office or your primary care provider.    Our office (Thoracic/Pulmonary--[phone]) with further questions/ concerns    You may resume your diet as it was prior to procedure.    You may resume your medications after the procedure unless you are instructed to do differently.     Please follow instructions from the nursing staff upon discharge in terms of activity. In general, you should avoid any attention or motor skill requiring activities (e.g., driving or operating any motorized vehicle) for 24 hours as you might be still under the effect of sedation medications. Please make sure an adult to accompany you next 24 hours.     Should you have any question, please do not hesitate to call our office.    Belkys Rios MD    [facility]   Adult Discharge Orders & Instructions     For 24 hours after surgery    1. Get plenty of rest.  A responsible adult must stay with you for at least 24 hours after you leave the hospital.   2. Do not drive or use heavy equipment.  If you have weakness or tingling, don't drive or use heavy equipment until this feeling goes away.  3. Do not drink alcohol.  4. Avoid strenuous or risky activities.  Ask for help when climbing stairs.   5. You may feel lightheaded.  IF so, sit for a few minutes before standing.  Have someone help you get up.   6. If you have nausea (feel sick to your stomach): Drink only clear liquids such as  apple juice, ginger ale, broth or 7-Up.  Rest may also help.  Be sure to drink enough fluids.  Move to a regular diet as you feel able.  7. You may have a slight fever. Call the doctor if your fever is over 100 F (37.7 C) (taken under the tongue) or lasts longer than 24 hours.  8. You may have a dry mouth, a sore throat, muscle aches or trouble sleeping.  These should go away after 24 hours.  9. Do not make important or legal decisions.   Call your doctor for any of the followin.  Signs of infection (fever, growing tenderness at the surgery site, a large amount of drainage or bleeding, severe pain, foul-smelling drainage, redness, swelling).    2. It has been over 8 to 10 hours since surgery and you are still not able to urinate (pass water).    3.  Headache for over 24 hours.    4.  Numbness, tingling or weakness the day after surgery (if you had spinal anesthesia).  To contact a doctor, call [phone] during business hours or [phone] and ask for pulmonology resident on call on nights and weekends.

## 2021-09-16 NOTE — ANESTHESIA CARE TRANSFER NOTE
Patient: Kecia Blue    Procedure(s):  BRONCHOSCOPY, flexible and rigid, CO2 Laser Debulking    Diagnosis: Bronchial stenosis, right [J98.09]  Diagnosis Additional Information: No value filed.    Anesthesia Type:   General     Note:    Oropharynx: oropharynx clear of all foreign objects and spontaneously breathing  Level of Consciousness: awake  Oxygen Supplementation: face mask  Level of Supplemental Oxygen (L/min / FiO2): 8  Independent Airway: airway patency satisfactory and stable  Dentition: dentition unchanged  Vital Signs Stable: post-procedure vital signs reviewed and stable  Report to RN Given: handoff report given  Patient transferred to: PACU    Handoff Report: Identifed the Patient, Identified the Reponsible Provider, Reviewed the pertinent medical history, Discussed the surgical course, Reviewed Intra-OP anesthesia mangement and issues during anesthesia, Set expectations for post-procedure period and Allowed opportunity for questions and acknowledgement of understanding      Vitals:  Vitals Value Taken Time   /64    Temp     Pulse 92 09/16/21 1002   Resp     SpO2 100 % 09/16/21 1002   Vitals shown include unvalidated device data.    Electronically Signed By: ADRIÁN Mo CRNA  September 16, 2021  10:03 AM

## 2021-09-16 NOTE — OR NURSING
Dr Osuna notified of pt BG level of 61. Per pt this is normal for her if taken in the finger. She is not diabetic. Dr Osuna elected not to treat and will continue to monitor.

## 2021-09-16 NOTE — ANESTHESIA CARE TRANSFER NOTE
Patient: Kecia Blue    Procedure(s):  BRONCHOSCOPY, flexible and rigid, CO2 Laser Debulking    Diagnosis: Bronchial stenosis, right [J98.09]  Diagnosis Additional Information: No value filed.    Anesthesia Type:   General     Note:    Oropharynx: oropharynx clear of all foreign objects  Level of Consciousness: awake  Oxygen Supplementation: face mask  Level of Supplemental Oxygen (L/min / FiO2): 8  Independent Airway: airway patency satisfactory and stable  Dentition: dentition unchanged  Vital Signs Stable: post-procedure vital signs reviewed and stable  Report to RN Given: handoff report given  Patient transferred to: PACU    Handoff Report: Identifed the Patient, Identified the Reponsible Provider, Reviewed the pertinent medical history, Discussed the surgical course, Reviewed Intra-OP anesthesia mangement and issues during anesthesia, Set expectations for post-procedure period and Allowed opportunity for questions and acknowledgement of understanding      Vitals:  Vitals Value Taken Time   BP     Temp     Pulse 95 09/16/21 0954   Resp     SpO2     Vitals shown include unvalidated device data.    Electronically Signed By: ADRIÁN Mo CRNA  September 16, 2021  9:56 AM

## 2021-09-17 LAB
1,3 BETA GLUCAN SER-MCNC: 210 PG/ML
ALP BONE SERPL-CCNC: 83 U/L
ALP LIVER SERPL-CCNC: 264 U/L
ALP OTHER SERPL-CCNC: 0 U/L
ALP SERPL-CCNC: 347 U/L
OBSERVATION IMP: POSITIVE

## 2021-09-20 ENCOUNTER — TELEPHONE (OUTPATIENT)
Dept: TRANSPLANT | Facility: CLINIC | Age: 61
End: 2021-09-20

## 2021-09-20 ENCOUNTER — APPOINTMENT (OUTPATIENT)
Dept: GENERAL RADIOLOGY | Facility: CLINIC | Age: 61
End: 2021-09-20
Attending: EMERGENCY MEDICINE
Payer: COMMERCIAL

## 2021-09-20 ENCOUNTER — HOSPITAL ENCOUNTER (INPATIENT)
Facility: CLINIC | Age: 61
LOS: 3 days | Discharge: HOME | End: 2021-09-23
Attending: EMERGENCY MEDICINE | Admitting: INTERNAL MEDICINE
Payer: COMMERCIAL

## 2021-09-20 DIAGNOSIS — Z11.52 ENCOUNTER FOR SCREENING LABORATORY TESTING FOR SEVERE ACUTE RESPIRATORY SYNDROME CORONAVIRUS 2 (SARS-COV-2): ICD-10-CM

## 2021-09-20 DIAGNOSIS — Z94.2 LUNG TRANSPLANT STATUS, BILATERAL (H): ICD-10-CM

## 2021-09-20 DIAGNOSIS — J18.9 HEALTHCARE-ASSOCIATED PNEUMONIA: ICD-10-CM

## 2021-09-20 DIAGNOSIS — J18.9 PNEUMONIA DUE TO INFECTIOUS ORGANISM, UNSPECIFIED LATERALITY, UNSPECIFIED PART OF LUNG: Primary | ICD-10-CM

## 2021-09-20 LAB
ALBUMIN SERPL-MCNC: 3 G/DL (ref 3.4–5)
ALP SERPL-CCNC: 321 U/L (ref 40–150)
ALT SERPL W P-5'-P-CCNC: 98 U/L (ref 0–50)
ANION GAP SERPL CALCULATED.3IONS-SCNC: 8 MMOL/L (ref 3–14)
AST SERPL W P-5'-P-CCNC: 22 U/L (ref 0–45)
BASOPHILS # BLD AUTO: 0 10E3/UL (ref 0–0.2)
BASOPHILS NFR BLD AUTO: 0 %
BILIRUB SERPL-MCNC: 1.1 MG/DL (ref 0.2–1.3)
BUN SERPL-MCNC: 90 MG/DL (ref 7–30)
CALCIUM SERPL-MCNC: 8.5 MG/DL (ref 8.5–10.1)
CHLORIDE BLD-SCNC: 101 MMOL/L (ref 94–109)
CO2 SERPL-SCNC: 23 MMOL/L (ref 20–32)
CREAT SERPL-MCNC: 5.85 MG/DL (ref 0.52–1.04)
EOSINOPHIL # BLD AUTO: 0.1 10E3/UL (ref 0–0.7)
EOSINOPHIL NFR BLD AUTO: 1 %
ERYTHROCYTE [DISTWIDTH] IN BLOOD BY AUTOMATED COUNT: 15.3 % (ref 10–15)
GFR SERPL CREATININE-BSD FRML MDRD: 7 ML/MIN/1.73M2
GLUCOSE BLD-MCNC: 98 MG/DL (ref 70–99)
HCT VFR BLD AUTO: 36.1 % (ref 35–47)
HGB BLD-MCNC: 11.3 G/DL (ref 11.7–15.7)
IMM GRANULOCYTES # BLD: 0.1 10E3/UL
IMM GRANULOCYTES NFR BLD: 1 %
LACTATE SERPL-SCNC: 1.2 MMOL/L (ref 0.7–2)
LYMPHOCYTES # BLD AUTO: 0.4 10E3/UL (ref 0.8–5.3)
LYMPHOCYTES NFR BLD AUTO: 4 %
MCH RBC QN AUTO: 30.8 PG (ref 26.5–33)
MCHC RBC AUTO-ENTMCNC: 31.3 G/DL (ref 31.5–36.5)
MCV RBC AUTO: 98 FL (ref 78–100)
MONOCYTES # BLD AUTO: 0.6 10E3/UL (ref 0–1.3)
MONOCYTES NFR BLD AUTO: 6 %
NEUTROPHILS # BLD AUTO: 8.8 10E3/UL (ref 1.6–8.3)
NEUTROPHILS NFR BLD AUTO: 88 %
NRBC # BLD AUTO: 0 10E3/UL
NRBC BLD AUTO-RTO: 0 /100
NT-PROBNP SERPL-MCNC: ABNORMAL PG/ML (ref 0–900)
PLATELET # BLD AUTO: 228 10E3/UL (ref 150–450)
POTASSIUM BLD-SCNC: 5.3 MMOL/L (ref 3.4–5.3)
PROCALCITONIN SERPL-MCNC: 0.21 NG/ML
PROT SERPL-MCNC: 7.4 G/DL (ref 6.8–8.8)
RBC # BLD AUTO: 3.67 10E6/UL (ref 3.8–5.2)
SARS-COV-2 RNA RESP QL NAA+PROBE: NEGATIVE
SODIUM SERPL-SCNC: 132 MMOL/L (ref 133–144)
TROPONIN I SERPL-MCNC: 0.02 UG/L (ref 0–0.04)
WBC # BLD AUTO: 9.9 10E3/UL (ref 4–11)

## 2021-09-20 PROCEDURE — 84484 ASSAY OF TROPONIN QUANT: CPT | Performed by: EMERGENCY MEDICINE

## 2021-09-20 PROCEDURE — 250N000011 HC RX IP 250 OP 636: Performed by: EMERGENCY MEDICINE

## 2021-09-20 PROCEDURE — 96365 THER/PROPH/DIAG IV INF INIT: CPT | Performed by: EMERGENCY MEDICINE

## 2021-09-20 PROCEDURE — 93005 ELECTROCARDIOGRAM TRACING: CPT | Performed by: EMERGENCY MEDICINE

## 2021-09-20 PROCEDURE — 99285 EMERGENCY DEPT VISIT HI MDM: CPT | Mod: 25 | Performed by: EMERGENCY MEDICINE

## 2021-09-20 PROCEDURE — 99222 1ST HOSP IP/OBS MODERATE 55: CPT | Mod: AI | Performed by: INTERNAL MEDICINE

## 2021-09-20 PROCEDURE — 84145 PROCALCITONIN (PCT): CPT | Performed by: EMERGENCY MEDICINE

## 2021-09-20 PROCEDURE — 85025 COMPLETE CBC W/AUTO DIFF WBC: CPT | Performed by: EMERGENCY MEDICINE

## 2021-09-20 PROCEDURE — 87040 BLOOD CULTURE FOR BACTERIA: CPT | Performed by: EMERGENCY MEDICINE

## 2021-09-20 PROCEDURE — 71046 X-RAY EXAM CHEST 2 VIEWS: CPT | Mod: 26 | Performed by: RADIOLOGY

## 2021-09-20 PROCEDURE — 99207 PR CDG-EXAM COMPONENT: MEETS DETAILED - DOWN CODED: CPT | Performed by: INTERNAL MEDICINE

## 2021-09-20 PROCEDURE — 80053 COMPREHEN METABOLIC PANEL: CPT | Performed by: EMERGENCY MEDICINE

## 2021-09-20 PROCEDURE — 36415 COLL VENOUS BLD VENIPUNCTURE: CPT | Performed by: EMERGENCY MEDICINE

## 2021-09-20 PROCEDURE — U0005 INFEC AGEN DETEC AMPLI PROBE: HCPCS | Performed by: EMERGENCY MEDICINE

## 2021-09-20 PROCEDURE — 120N000011 HC R&B TRANSPLANT UMMC

## 2021-09-20 PROCEDURE — 93010 ELECTROCARDIOGRAM REPORT: CPT | Performed by: EMERGENCY MEDICINE

## 2021-09-20 PROCEDURE — C9803 HOPD COVID-19 SPEC COLLECT: HCPCS | Performed by: EMERGENCY MEDICINE

## 2021-09-20 PROCEDURE — 83605 ASSAY OF LACTIC ACID: CPT | Performed by: EMERGENCY MEDICINE

## 2021-09-20 PROCEDURE — 71046 X-RAY EXAM CHEST 2 VIEWS: CPT

## 2021-09-20 PROCEDURE — 83880 ASSAY OF NATRIURETIC PEPTIDE: CPT | Performed by: EMERGENCY MEDICINE

## 2021-09-20 RX ORDER — PIPERACILLIN SODIUM, TAZOBACTAM SODIUM 3; .375 G/15ML; G/15ML
3.38 INJECTION, POWDER, LYOPHILIZED, FOR SOLUTION INTRAVENOUS ONCE
Status: COMPLETED | OUTPATIENT
Start: 2021-09-20 | End: 2021-09-21

## 2021-09-20 RX ADMIN — PIPERACILLIN AND TAZOBACTAM 3.38 G: 3; .375 INJECTION, POWDER, LYOPHILIZED, FOR SOLUTION INTRAVENOUS at 21:43

## 2021-09-20 ASSESSMENT — ENCOUNTER SYMPTOMS
DIARRHEA: 1
NAUSEA: 0
FEVER: 0
COUGH: 1
VOMITING: 0
SHORTNESS OF BREATH: 1

## 2021-09-20 ASSESSMENT — MIFFLIN-ST. JEOR: SCORE: 1093.45

## 2021-09-20 NOTE — ED TRIAGE NOTES
Triage Assessment & Note:    LMP 06/07/2014 (Exact Date)     Patient presents with: PT is a lung txp pt who is experiencing some SOB and low SPo2 at home 85. In triage 95% on RA.  PT reports a recent bronch and feeling generally worn out and poor PO intake over the last day.     Home Treatments/Remedies: Prescribed meds     Febrile / Afebrile? Afebrile     Duration of C/o:  2 days     Steven Jenkins RN  September 20, 2021

## 2021-09-20 NOTE — LETTER
[facility] [address] [address]  [phone]    FACSIMILE TRANSMITTAL SHEET    TO: [facility]        _____URGENT _____REVIEW ONLY _____PLEASE COMMENT____PLEASE REPLY    NOTES/COMMENTS: Attached please find final discharge orders for Kecia Blue.    Vilma Bales, RN BSN, PHN, ACM-RN  7A RN Care Coordinator  Phone: [phone]  Pager [phone]    9/23/2021 11:08 AM                                        IF YOU DID NOT RECEIVE THE CORRECT NUMBER OF PAGES OR THE FAX DID NOT COME THROUGH CLEARLY, PLEASE CALL THE SENDER     CONFIDENTIALITY STATEMENT: Confidential information that may accompany this transmission contains protected health information under state and federal law and is legally privileged. This information is intended only for the use of the individual or entity named above and may be used only for carrying out treatment, payment or other healthcare operations. The recipient or person responsible for delivering this information is prohibited by law from disclosing this information without proper authorization to any other party, unless required to do so by law or regulation. If you are not the intended recipient, you are hereby notified that any review, dissemination, distribution, or copying of this message is strictly prohibited. If you have received this communication in error, please destroy the materials and contact us immediately by calling the number listed above. No response indicates that the information was received by the appropriate authorized party

## 2021-09-20 NOTE — TELEPHONE ENCOUNTER
Kecia reports she isn't feel well today. Had a bronch on Thursday. Friday felt tired, Saturday felt great, Sunday started feeling sick again, and now today she's coughing a lot. Will have about an hour break then cough a lot again. Sputum is clear with occ tinged of blood. She has no energy or appetite. Oxygen around 92%. Will review with Ame and IP team.

## 2021-09-21 LAB
ALBUMIN SERPL-MCNC: 2.6 G/DL (ref 3.4–5)
ALP SERPL-CCNC: 324 U/L (ref 40–150)
ALT SERPL W P-5'-P-CCNC: 80 U/L (ref 0–50)
ANION GAP SERPL CALCULATED.3IONS-SCNC: 15 MMOL/L (ref 3–14)
AST SERPL W P-5'-P-CCNC: 22 U/L (ref 0–45)
ATRIAL RATE - MUSE: 78 BPM
BILIRUB SERPL-MCNC: 1.2 MG/DL (ref 0.2–1.3)
BUN SERPL-MCNC: 92 MG/DL (ref 7–30)
CALCIUM SERPL-MCNC: 8.2 MG/DL (ref 8.5–10.1)
CHLORIDE BLD-SCNC: 102 MMOL/L (ref 94–109)
CO2 SERPL-SCNC: 19 MMOL/L (ref 20–32)
CREAT SERPL-MCNC: 6.02 MG/DL (ref 0.52–1.04)
DIASTOLIC BLOOD PRESSURE - MUSE: NORMAL MMHG
ERYTHROCYTE [DISTWIDTH] IN BLOOD BY AUTOMATED COUNT: 15.2 % (ref 10–15)
GFR SERPL CREATININE-BSD FRML MDRD: 7 ML/MIN/1.73M2
GLUCOSE BLD-MCNC: 161 MG/DL (ref 70–99)
HCT VFR BLD AUTO: 31.9 % (ref 35–47)
HGB BLD-MCNC: 10.3 G/DL (ref 11.7–15.7)
INTERPRETATION ECG - MUSE: NORMAL
MCH RBC QN AUTO: 30.6 PG (ref 26.5–33)
MCHC RBC AUTO-ENTMCNC: 32.3 G/DL (ref 31.5–36.5)
MCV RBC AUTO: 95 FL (ref 78–100)
MRSA DNA SPEC QL NAA+PROBE: NEGATIVE
P AXIS - MUSE: 79 DEGREES
PLATELET # BLD AUTO: 218 10E3/UL (ref 150–450)
POTASSIUM BLD-SCNC: 4.6 MMOL/L (ref 3.4–5.3)
PR INTERVAL - MUSE: 192 MS
PROT SERPL-MCNC: 6.7 G/DL (ref 6.8–8.8)
QRS DURATION - MUSE: 106 MS
QT - MUSE: 402 MS
QTC - MUSE: 458 MS
R AXIS - MUSE: -18 DEGREES
RBC # BLD AUTO: 3.37 10E6/UL (ref 3.8–5.2)
SA TARGET DNA: NEGATIVE
SODIUM SERPL-SCNC: 136 MMOL/L (ref 133–144)
SYSTOLIC BLOOD PRESSURE - MUSE: NORMAL MMHG
T AXIS - MUSE: -31 DEGREES
VANCOMYCIN SERPL-MCNC: 18.8 MG/L
VENTRICULAR RATE- MUSE: 78 BPM
WBC # BLD AUTO: 11.5 10E3/UL (ref 4–11)

## 2021-09-21 PROCEDURE — 96366 THER/PROPH/DIAG IV INF ADDON: CPT | Performed by: EMERGENCY MEDICINE

## 2021-09-21 PROCEDURE — 85027 COMPLETE CBC AUTOMATED: CPT

## 2021-09-21 PROCEDURE — 87641 MR-STAPH DNA AMP PROBE: CPT

## 2021-09-21 PROCEDURE — 250N000013 HC RX MED GY IP 250 OP 250 PS 637: Performed by: INTERNAL MEDICINE

## 2021-09-21 PROCEDURE — 120N000011 HC R&B TRANSPLANT UMMC

## 2021-09-21 PROCEDURE — 250N000009 HC RX 250

## 2021-09-21 PROCEDURE — 258N000003 HC RX IP 258 OP 636: Performed by: EMERGENCY MEDICINE

## 2021-09-21 PROCEDURE — 258N000003 HC RX IP 258 OP 636: Performed by: STUDENT IN AN ORGANIZED HEALTH CARE EDUCATION/TRAINING PROGRAM

## 2021-09-21 PROCEDURE — 86706 HEP B SURFACE ANTIBODY: CPT | Performed by: STUDENT IN AN ORGANIZED HEALTH CARE EDUCATION/TRAINING PROGRAM

## 2021-09-21 PROCEDURE — 99254 IP/OBS CNSLTJ NEW/EST MOD 60: CPT | Performed by: NURSE PRACTITIONER

## 2021-09-21 PROCEDURE — 250N000009 HC RX 250: Performed by: STUDENT IN AN ORGANIZED HEALTH CARE EDUCATION/TRAINING PROGRAM

## 2021-09-21 PROCEDURE — 5A1D70Z PERFORMANCE OF URINARY FILTRATION, INTERMITTENT, LESS THAN 6 HOURS PER DAY: ICD-10-PCS | Performed by: INTERNAL MEDICINE

## 2021-09-21 PROCEDURE — 250N000011 HC RX IP 250 OP 636: Performed by: EMERGENCY MEDICINE

## 2021-09-21 PROCEDURE — 36415 COLL VENOUS BLD VENIPUNCTURE: CPT | Performed by: STUDENT IN AN ORGANIZED HEALTH CARE EDUCATION/TRAINING PROGRAM

## 2021-09-21 PROCEDURE — 250N000013 HC RX MED GY IP 250 OP 250 PS 637

## 2021-09-21 PROCEDURE — 80053 COMPREHEN METABOLIC PANEL: CPT

## 2021-09-21 PROCEDURE — 250N000012 HC RX MED GY IP 250 OP 636 PS 637

## 2021-09-21 PROCEDURE — 90937 HEMODIALYSIS REPEATED EVAL: CPT

## 2021-09-21 PROCEDURE — 99221 1ST HOSP IP/OBS SF/LOW 40: CPT | Mod: GC | Performed by: INTERNAL MEDICINE

## 2021-09-21 PROCEDURE — 99233 SBSQ HOSP IP/OBS HIGH 50: CPT | Mod: GC | Performed by: STUDENT IN AN ORGANIZED HEALTH CARE EDUCATION/TRAINING PROGRAM

## 2021-09-21 PROCEDURE — 87340 HEPATITIS B SURFACE AG IA: CPT | Performed by: STUDENT IN AN ORGANIZED HEALTH CARE EDUCATION/TRAINING PROGRAM

## 2021-09-21 PROCEDURE — 258N000003 HC RX IP 258 OP 636

## 2021-09-21 PROCEDURE — 94640 AIRWAY INHALATION TREATMENT: CPT | Mod: 76

## 2021-09-21 PROCEDURE — 250N000011 HC RX IP 250 OP 636

## 2021-09-21 PROCEDURE — 36415 COLL VENOUS BLD VENIPUNCTURE: CPT | Performed by: NURSE PRACTITIONER

## 2021-09-21 PROCEDURE — 87633 RESP VIRUS 12-25 TARGETS: CPT | Performed by: NURSE PRACTITIONER

## 2021-09-21 PROCEDURE — 80202 ASSAY OF VANCOMYCIN: CPT | Performed by: STUDENT IN AN ORGANIZED HEALTH CARE EDUCATION/TRAINING PROGRAM

## 2021-09-21 PROCEDURE — 87581 M.PNEUMON DNA AMP PROBE: CPT | Performed by: NURSE PRACTITIONER

## 2021-09-21 RX ORDER — AMOXICILLIN 250 MG
2 CAPSULE ORAL 2 TIMES DAILY PRN
Status: DISCONTINUED | OUTPATIENT
Start: 2021-09-21 | End: 2021-09-23 | Stop reason: HOSPADM

## 2021-09-21 RX ORDER — PIPERACILLIN SODIUM, TAZOBACTAM SODIUM 2; .25 G/10ML; G/10ML
2.25 INJECTION, POWDER, LYOPHILIZED, FOR SOLUTION INTRAVENOUS EVERY 6 HOURS
Status: DISCONTINUED | OUTPATIENT
Start: 2021-09-21 | End: 2021-09-21

## 2021-09-21 RX ORDER — SULFAMETHOXAZOLE AND TRIMETHOPRIM 400; 80 MG/1; MG/1
1 TABLET ORAL
Status: DISCONTINUED | OUTPATIENT
Start: 2021-09-22 | End: 2021-09-23 | Stop reason: HOSPADM

## 2021-09-21 RX ORDER — HEPARIN SODIUM 1000 [USP'U]/ML
1000 INJECTION, SOLUTION INTRAVENOUS; SUBCUTANEOUS
Status: DISCONTINUED | OUTPATIENT
Start: 2021-09-21 | End: 2021-09-21

## 2021-09-21 RX ORDER — ONDANSETRON 4 MG/1
4 TABLET, ORALLY DISINTEGRATING ORAL EVERY 6 HOURS PRN
Status: DISCONTINUED | OUTPATIENT
Start: 2021-09-21 | End: 2021-09-23 | Stop reason: HOSPADM

## 2021-09-21 RX ORDER — POLYETHYLENE GLYCOL 3350 17 G/17G
17 POWDER, FOR SOLUTION ORAL DAILY PRN
Status: DISCONTINUED | OUTPATIENT
Start: 2021-09-21 | End: 2021-09-23 | Stop reason: HOSPADM

## 2021-09-21 RX ORDER — SODIUM CHLORIDE 9 MG/ML
INJECTION, SOLUTION INTRAVENOUS
Status: COMPLETED
Start: 2021-09-21 | End: 2021-09-21

## 2021-09-21 RX ORDER — ALBUTEROL SULFATE 0.83 MG/ML
2.5 SOLUTION RESPIRATORY (INHALATION) 2 TIMES DAILY
Status: DISCONTINUED | OUTPATIENT
Start: 2021-09-21 | End: 2021-09-23 | Stop reason: HOSPADM

## 2021-09-21 RX ORDER — PIPERACILLIN SODIUM, TAZOBACTAM SODIUM 2; .25 G/10ML; G/10ML
2.25 INJECTION, POWDER, LYOPHILIZED, FOR SOLUTION INTRAVENOUS EVERY 6 HOURS
Status: DISPENSED | OUTPATIENT
Start: 2021-09-21 | End: 2021-09-23

## 2021-09-21 RX ORDER — LIDOCAINE 40 MG/G
CREAM TOPICAL
Status: DISCONTINUED | OUTPATIENT
Start: 2021-09-21 | End: 2021-09-23 | Stop reason: HOSPADM

## 2021-09-21 RX ORDER — AMOXICILLIN 250 MG
1 CAPSULE ORAL 2 TIMES DAILY PRN
Status: DISCONTINUED | OUTPATIENT
Start: 2021-09-21 | End: 2021-09-23 | Stop reason: HOSPADM

## 2021-09-21 RX ORDER — VIT B COMP NO.3/FOLIC/C/BIOTIN 1 MG-60 MG
1 TABLET ORAL DAILY
Status: DISCONTINUED | OUTPATIENT
Start: 2021-09-21 | End: 2021-09-23 | Stop reason: HOSPADM

## 2021-09-21 RX ORDER — TACROLIMUS 1 MG/1
1 CAPSULE ORAL
Status: DISCONTINUED | OUTPATIENT
Start: 2021-09-21 | End: 2021-09-23 | Stop reason: HOSPADM

## 2021-09-21 RX ORDER — BUDESONIDE 0.5 MG/2ML
0.5 INHALANT ORAL 2 TIMES DAILY
Status: DISCONTINUED | OUTPATIENT
Start: 2021-09-21 | End: 2021-09-23 | Stop reason: HOSPADM

## 2021-09-21 RX ORDER — TACROLIMUS 0.5 MG/1
0.5 CAPSULE ORAL EVERY EVENING
Status: DISCONTINUED | OUTPATIENT
Start: 2021-09-21 | End: 2021-09-23 | Stop reason: HOSPADM

## 2021-09-21 RX ORDER — TACROLIMUS 0.5 MG/1
0.5 CAPSULE ORAL 2 TIMES DAILY
Status: DISCONTINUED | OUTPATIENT
Start: 2021-09-21 | End: 2021-09-21

## 2021-09-21 RX ORDER — PREDNISONE 5 MG/1
5 TABLET ORAL EVERY EVENING
Status: DISCONTINUED | OUTPATIENT
Start: 2021-09-21 | End: 2021-09-23 | Stop reason: HOSPADM

## 2021-09-21 RX ORDER — PREDNISONE 2.5 MG/1
2.5 TABLET ORAL EVERY MORNING
Status: DISCONTINUED | OUTPATIENT
Start: 2021-09-21 | End: 2021-09-23 | Stop reason: HOSPADM

## 2021-09-21 RX ORDER — HEPARIN SODIUM 1000 [USP'U]/ML
1000 INJECTION, SOLUTION INTRAVENOUS; SUBCUTANEOUS CONTINUOUS
Status: DISCONTINUED | OUTPATIENT
Start: 2021-09-21 | End: 2021-09-21

## 2021-09-21 RX ORDER — PANTOPRAZOLE SODIUM 40 MG/1
40 TABLET, DELAYED RELEASE ORAL DAILY
Status: DISCONTINUED | OUTPATIENT
Start: 2021-09-21 | End: 2021-09-23 | Stop reason: HOSPADM

## 2021-09-21 RX ORDER — METOPROLOL TARTRATE 25 MG/1
25 TABLET, FILM COATED ORAL 2 TIMES DAILY
Status: DISCONTINUED | OUTPATIENT
Start: 2021-09-21 | End: 2021-09-23 | Stop reason: HOSPADM

## 2021-09-21 RX ORDER — POSACONAZOLE 100 MG/1
300 TABLET, DELAYED RELEASE ORAL DAILY
Status: DISCONTINUED | OUTPATIENT
Start: 2021-09-21 | End: 2021-09-23 | Stop reason: HOSPADM

## 2021-09-21 RX ADMIN — MAGNESIUM 64 MG (MAGNESIUM CHLORIDE) TABLET,DELAYED RELEASE 1605 MG: at 20:56

## 2021-09-21 RX ADMIN — SODIUM CHLORIDE: 9 INJECTION, SOLUTION INTRAVENOUS at 21:11

## 2021-09-21 RX ADMIN — B-COMPLEX W/ C & FOLIC ACID TAB 1 MG 1 TABLET: 1 TAB at 07:58

## 2021-09-21 RX ADMIN — TACROLIMUS 0.5 MG: 0.5 CAPSULE ORAL at 20:12

## 2021-09-21 RX ADMIN — POSACONAZOLE 300 MG: 100 TABLET, COATED ORAL at 08:00

## 2021-09-21 RX ADMIN — SODIUM CHLORIDE 250 ML: 9 INJECTION, SOLUTION INTRAVENOUS at 13:25

## 2021-09-21 RX ADMIN — LIDOCAINE HYDROCHLORIDE 0.5 ML: 10 INJECTION, SOLUTION EPIDURAL; INFILTRATION; INTRACAUDAL; PERINEURAL at 13:20

## 2021-09-21 RX ADMIN — BUDESONIDE 0.5 MG: 0.5 INHALANT ORAL at 08:03

## 2021-09-21 RX ADMIN — TACROLIMUS 1 MG: 1 CAPSULE ORAL at 08:01

## 2021-09-21 RX ADMIN — PIPERACILLIN AND TAZOBACTAM 2.25 G: 2; .25 INJECTION, POWDER, FOR SOLUTION INTRAVENOUS at 07:12

## 2021-09-21 RX ADMIN — ALBUTEROL SULFATE 2.5 MG: 2.5 SOLUTION RESPIRATORY (INHALATION) at 08:03

## 2021-09-21 RX ADMIN — METOPROLOL TARTRATE 25 MG: 25 TABLET, FILM COATED ORAL at 20:11

## 2021-09-21 RX ADMIN — METOPROLOL TARTRATE 25 MG: 25 TABLET, FILM COATED ORAL at 07:58

## 2021-09-21 RX ADMIN — VANCOMYCIN HYDROCHLORIDE 1250 MG: 10 INJECTION, POWDER, LYOPHILIZED, FOR SOLUTION INTRAVENOUS at 04:00

## 2021-09-21 RX ADMIN — PIPERACILLIN AND TAZOBACTAM 2.25 G: 2; .25 INJECTION, POWDER, FOR SOLUTION INTRAVENOUS at 21:01

## 2021-09-21 RX ADMIN — PREDNISONE 5 MG: 5 TABLET ORAL at 20:12

## 2021-09-21 RX ADMIN — BUDESONIDE 0.5 MG: 0.5 INHALANT ORAL at 20:11

## 2021-09-21 RX ADMIN — Medication 25 MG: at 07:57

## 2021-09-21 RX ADMIN — ALBUTEROL SULFATE 2.5 MG: 2.5 SOLUTION RESPIRATORY (INHALATION) at 20:11

## 2021-09-21 RX ADMIN — PIPERACILLIN AND TAZOBACTAM 2.25 G: 2; .25 INJECTION, POWDER, FOR SOLUTION INTRAVENOUS at 16:10

## 2021-09-21 RX ADMIN — SODIUM CHLORIDE 300 ML: 9 INJECTION, SOLUTION INTRAVENOUS at 13:25

## 2021-09-21 RX ADMIN — PANTOPRAZOLE SODIUM 40 MG: 40 TABLET, DELAYED RELEASE ORAL at 07:58

## 2021-09-21 RX ADMIN — PREDNISONE 2.5 MG: 2.5 TABLET ORAL at 08:00

## 2021-09-21 ASSESSMENT — MIFFLIN-ST. JEOR
SCORE: 1114.13
SCORE: 1094.13
SCORE: 1114.13

## 2021-09-21 ASSESSMENT — ACTIVITIES OF DAILY LIVING (ADL)
DIFFICULTY_EATING/SWALLOWING: NO
TOILETING_ISSUES: NO
HEARING_DIFFICULTY_OR_DEAF: NO
DRESSING/BATHING_DIFFICULTY: NO
ADLS_ACUITY_SCORE: 12
WALKING_OR_CLIMBING_STAIRS_DIFFICULTY: NO
CONCENTRATING,_REMEMBERING_OR_MAKING_DECISIONS_DIFFICULTY: NO
FALL_HISTORY_WITHIN_LAST_SIX_MONTHS: NO
WEAR_GLASSES_OR_BLIND: YES
VISION_MANAGEMENT: GLASSES
DIFFICULTY_COMMUNICATING: NO
DOING_ERRANDS_INDEPENDENTLY_DIFFICULTY: NO

## 2021-09-21 NOTE — CONSULTS
Nephrology Initial Consult  September 21, 2021      Kecia Blue MRN:[id number] YOB: 1960  Date of Admission:9/20/2021  Primary care provider: Rosy Vu  Requesting physician: Rikki Whipple MD    ASSESSMENT AND RECOMMENDATIONS:   # ESRD, MWF dialysis  Secondary to CNI toxicity. Dialyzes with [facility], primary nephrologist Dr. Glasgow. EDW 53 kg.  - HD today, 2 hours  - HD tomorrow if not discharged    # Volume  Modestly volume overloaded but likely not explanatory of her hypoxia at home.    # HTN  Continue PTA antihypertensives.    Recommendations were communicated to primary team via note and verbally    Seen and discussed with Dr. Anjana Grossman MD       REASON FOR CONSULT: ESRD, hypoxia    HISTORY OF PRESENT ILLNESS:  Admitting provider and nursing notes reviewed  Kecia Blue is a 60 year old F with pmh of BLT in 2018 c/b ESRD on HD who presented after finding herself to be hypoxic at home.    No hypoxia in the ED, but she did miss dialysis and is ~1.5 kg up from dry weight. She reports that she feels she needs to be dialyzed and would be more comfortable with some fluid off. Otherwise no concerns or complaints for me this AM.    PAST MEDICAL HISTORY:  Reviewed with patient on 09/21/2021     Past Medical History:   Diagnosis Date     Acute on chronic respiratory failure with hypoxia (H) 02/21/2018     Anisocoria      Antisynthetase syndrome (H) 2014     Anxiety      Aspergillus (H)      Aspergillus pneumonia (H) 11/20/2020     Benign essential hypertension      C. difficile colitis      Cytomegalovirus (CMV) viremia (H)      Dermatomyositis (H)      Dysplasia of cervix, low grade (ESTRADA 1)      EBV (Franklin-Barr virus) viremia      ESRD (end stage renal disease) on dialysis (H)      ILD (interstitial lung disease) (H)      Lung replaced by transplant (H)      Osteopenia      Paroxysmal atrial fibrillation (H)      Pneumocystis jiroveci pneumonia (H)       PONV (postoperative nausea and vomiting)      Post-menopause      Pulmonary hypertension (H)      Raynaud's disease      Seronegative rheumatoid arthritis (H)        Past Surgical History:   Procedure Laterality Date     BRONCHOSCOPY (RIGID OR FLEXIBLE), DIAGNOSTIC N/A 4/10/2018    Procedure: COMBINED BRONCHOSCOPY (RIGID OR FLEXIBLE), LAVAGE;;  Surgeon: Mariposa Donohue MD;  [facility]     BRONCHOSCOPY (RIGID OR FLEXIBLE), DIAGNOSTIC N/A 12/23/2020    Procedure: BRONCHOSCOPY, WITH BRONCHOALVEOLAR LAVAGE;  Surgeon: Uri Bass MD;  [facility]     BRONCHOSCOPY (RIGID OR FLEXIBLE), DILATE BRONCHUS / TRACHEA N/A 10/11/2018    Procedure: BRONCHOSCOPY (RIGID OR FLEXIBLE), DILATE BRONCHUS / TRACHEA;  Flexible And Rigid Bronchoscopy and Dilation;  Surgeon: Wilber Lin MD;  [facility]     BRONCHOSCOPY FLEXIBLE N/A 3/13/2018    Procedure: BRONCHOSCOPY FLEXIBLE;  Flexible Bronchoscopy ;  Surgeon: Gissell Sanchez MD;  [facility]     BRONCHOSCOPY FLEXIBLE N/A 5/9/2018    Procedure: BRONCHOSCOPY FLEXIBLE;;  Surgeon: Wilber Lin MD;  [facility]     BRONCHOSCOPY FLEXIBLE AND RIGID N/A 9/10/2018    Procedure: BRONCHOSCOPY FLEXIBLE AND RIGID;  Flexible and Rigid Bronchoscopy with Balloon Dilation, tissue debulking with cryo, and Right mainstem bronchus stent placement;  Surgeon: Wilber Lin MD;  [facility]     BRONCHOSCOPY RIGID N/A 6/6/2018    Procedure: BRONCHOSCOPY RIGID;;  Surgeon: Lopez Macias MD;  [facility]     BRONCHOSCOPY, DILATE BRONCHUS, STENT BRONCHUS, COMBINED N/A 6/11/2018    Procedure: COMBINED BRONCHOSCOPY, DILATE BRONCHUS, STENT BRONCHUS;  Flexible Bronchoscopy, Balloon Dilation, Bronchial Washings;  Surgeon: Wilber Lin MD;  [facility]     BRONCHOSCOPY, DILATE BRONCHUS, STENT BRONCHUS, COMBINED Right 7/10/2018    Procedure: COMBINED BRONCHOSCOPY, DILATE BRONCHUS, STENT BRONCHUS;  Flexible Bronchoscopy,  Balloon Dilation, Bronchial Washings  ;  Surgeon: Wilber Lin MD;  [facility]     BRONCHOSCOPY, DILATE BRONCHUS, STENT BRONCHUS, COMBINED N/A 8/2/2018    Procedure: COMBINED BRONCHOSCOPY, DILATE BRONCHUS, STENT BRONCHUS;  Flexible Bronchoscopy, Bronchial Washings, Balloon Dilation;  Surgeon: Wilber Lin MD;  [facility]     BRONCHOSCOPY, DILATE BRONCHUS, STENT BRONCHUS, COMBINED N/A 8/20/2018    Procedure: COMBINED BRONCHOSCOPY, DILATE BRONCHUS, STENT BRONCHUS;  Flexible Bronchoscopy, Balloon Dilation;  Surgeon: Wilber Lin MD;  [facility]     BRONCHOSCOPY, DILATE BRONCHUS, STENT BRONCHUS, COMBINED N/A 10/29/2018    Procedure: Flexible Bronchoscopy, Balloon Dilation, Stent Revision, Airway Examination And Therapeutic Suctioning, Cyro Tumor Debulking;  Surgeon: Wilber Lin MD;  [facility]     BRONCHOSCOPY, DILATE BRONCHUS, STENT BRONCHUS, COMBINED N/A 11/20/2018    Procedure: Rigid Bronchoscopy, Stent Removal and dilitation;  Surgeon: Wilber Lin MD;  [facility]     BRONCHOSCOPY, DILATE BRONCHUS, STENT BRONCHUS, COMBINED N/A 12/14/2018    Procedure: Flexible And Rigid Bronchoscopy, Balloon Dilation, Bronchial Washing;  Surgeon: Wilber Lin MD;  [facility]     BRONCHOSCOPY, DILATE BRONCHUS, STENT BRONCHUS, COMBINED N/A 1/17/2019    Procedure: Flexible And Rigid Bronchoscopy, Balloon Dilation.;  Surgeon: Wilber Lin MD;  [facility]     BRONCHOSCOPY, DILATE BRONCHUS, STENT BRONCHUS, COMBINED N/A 3/7/2019    Procedure: Flexible and Rigid Bronchoscopy, Bronchial Washing, Balloon Dilation;  Surgeon: iWlber Lin MD;  [facility]     BRONCHOSCOPY, DILATE BRONCHUS, STENT BRONCHUS, COMBINED N/A 6/6/2019    Procedure: Rigid and Flexible Bronchoscopy, Balloon Dilation;  Surgeon: Wilber Lin MD;  [facility]     BRONCHOSCOPY, DILATE BRONCHUS, STENT BRONCHUS, COMBINED N/A 10/11/2019     Procedure: Flexible and Rigid Bronchoscopy, Balloon Dilation, Bronchoalveolar Lagave;  Surgeon: Wilber Lin MD;  [facility]     BRONCHOSCOPY, DILATE BRONCHUS, STENT BRONCHUS, COMBINED N/A 2/19/2021    Procedure: BRONCHOSCOPY, flexible, airway dilation, and bronchial wash;  Surgeon: Wilber Lin MD;  [facility]     BRONCHOSCOPY, DILATE BRONCHUS, STENT BRONCHUS, COMBINED N/A 4/9/2021    Procedure: BRONCHOSCOPY, flexible and rigid, Airway suctioning;  Surgeon: Mati Norris MD;  [facility]     CV RIGHT HEART CATH MEASUREMENTS RECORDED N/A 3/10/2020    Procedure: CV RIGHT HEART CATH;  Surgeon: Wai Garcia MD;  [facility]     ENT SURGERY      tonsillectomy as a child     ESOPHAGOSCOPY, GASTROSCOPY, DUODENOSCOPY (EGD), COMBINED N/A 10/29/2018    Procedure: COMBINED ESOPHAGOSCOPY, GASTROSCOPY, DUODENOSCOPY (EGD) with biopsies and polypectomy;  Surgeon: Chente Bloom MD;  [facility]     INSERT EXTRACORPORAL MEMBRANE OXYGENATOR ADULT (OUTSIDE OR) N/A 2/27/2018    Procedure: INSERT EXTRACORPORAL MEMBRANE OXYGENATOR ADULT (OUTSIDE OR);  INSERT EXTRACORPORAL MEMBRANE OXYGENATOR ADULT (OUTSIDE OR) ;  Surgeon: Toby Hernandez MD;  [facility]     IR CVC TUNNEL PLACEMENT > 5 YRS OF AGE  10/25/2019     IR DIALYSIS FISTULOGRAM LEFT  3/2/2021     IR DIALYSIS MECH THROMB, PTA  3/2/2021     IR FLUORO 0-1 HOUR  5/7/2021     IR GASTRO JEJUNOSTOMY TUBE PLACEMENT  2/16/2021     IR PARACENTESIS  1/8/2020     IR THORACENTESIS  9/13/2019     IR TRANSCATHETER BIOPSY  1/8/2020     LASER CO2 BRONCHOSCOPY N/A 4/30/2021    Procedure: Flexible and Rigid Bronchoscopy and Tissue Debulking with CO2 Laser Assistance;  Surgeon: Mati Norris MD;  [facility]     LASER CO2 BRONCHOSCOPY N/A 6/11/2021    Procedure: BRONCHOSCOPY, Flexible and Rigid Bronchoscopy, Tissue Debulking with cryo Assistance, airway dilation,;  Surgeon: Mati Norris MD;   [facility]     LASER CO2 BRONCHOSCOPY N/A 9/16/2021    Procedure: BRONCHOSCOPY, flexible and rigid, CO2 Laser Debulking;  Surgeon: Mati Norris MD;  [facility]     no prior surgery       REMOVE EXTRACORPORAL MEMBRANE OXYGENATOR ADULT N/A 3/3/2018    Procedure: REMOVE EXTRACORPORAL MEMBRANE OXYGENATOR ADULT;  Removal of Right Femoral Venous and Right Axillary Arterial Extracorporeal Membrane Oxygenator;  Surgeon: Toby Hernandez MD;  [facility]     TRANSPLANT LUNG RECIPIENT SINGLE X2 Bilateral 3/1/2018    Procedure: TRANSPLANT LUNG RECIPIENT SINGLE X2;  Median Sternotomy, Extracorporeal Membrane Oxygenator, bilateral sequential lung transplant;  Surgeon: Toby Hernandez MD;  [facility]        MEDICATIONS:  PTA Meds  Prior to Admission medications    Medication Sig Last Dose Taking? Auth Provider   acetaminophen (TYLENOL) 325 MG tablet Take 1 tablet (325 mg) by mouth every 4 hours as needed for mild pain or fever   Leelee Huang MD   albuterol (PROVENTIL) (2.5 MG/3ML) 0.083% neb solution Take 1 vial (2.5 mg) by nebulization 2 times daily   Tarik Christensen MD   azaTHIOprine (IMURAN) 50 MG tablet Take 0.5 tablets (25 mg) by mouth daily   Ame Chow PA-C   budesonide (PULMICORT) 0.5 MG/2ML neb solution Take 2 mLs (0.5 mg) by nebulization 2 times daily   Ame Chow PA-C   Magnesium Cl-Calcium Carbonate (SLOW-MAG) 71.5-119 MG TBEC Take 3 tablets by mouth four times a week Take on non dialysis days T/Th/Sa/Su   Yenni Graham MD   metoprolol tartrate (LOPRESSOR) 25 MG tablet 1 tablet (25 mg) by Oral or Feeding Tube route 2 times daily   Clarisse Roberson MD   multivitamin RENAL (RENAVITE RX/NEPHROVITE) 1 MG tablet Take 1 tablet by mouth daily   Ame Chow PA-C   ondansetron (ZOFRAN-ODT) 4 MG ODT tab Take 1 tablet (4 mg) by mouth every 6 hours as needed for nausea or vomiting   Lissett Rodriguez PA-C   pantoprazole (PROTONIX) 40 MG EC  tablet Take 1 tablet (40 mg) by mouth every morning (before breakfast)   Lissett Rodriguez PA-C   posaconazole (NOXAFIL) 100 MG EC tablet Take 3 tablets (300 mg) by mouth daily   Ame Chow PA-C   predniSONE (DELTASONE) 2.5 MG tablet Take 1 tablet (2.5 mg) by mouth every evening   Ame Chow PA-C   predniSONE (DELTASONE) 5 MG tablet Take 1 tablet (5 mg) by mouth every morning   Ame Chow PA-C   sulfamethoxazole-trimethoprim (BACTRIM) 400-80 MG tablet Take 1 tablet by mouth Every Mon, Wed, Fri Morning   Ame Chow PA-C   tacrolimus (GENERIC EQUIVALENT) 0.5 MG capsule Take 2 capsules (1mg) every AM and 1 capsule (0.5mg) every PM   Clarisse Roberson MD   tacrolimus (GENERIC EQUIVALENT) 1 MG capsule Patient needs a refill for dose adjustments: currently on 0.5 mg BID.   Ame Chow PA-C      Current Meds    albuterol  2.5 mg Nebulization BID     azaTHIOprine  25 mg Oral Daily     budesonide  0.5 mg Nebulization BID     magnesium chloride  1,605 mg Oral Once per day on Sun Tue Thu Sat     metoprolol tartrate  25 mg Oral or Feeding Tube BID     multivitamin RENAL  1 tablet Oral Daily     pantoprazole  40 mg Oral Daily     piperacillin-tazobactam  2.25 g Intravenous Q6H     posaconazole  300 mg Oral Daily     predniSONE  2.5 mg Oral QAM     predniSONE  5 mg Oral QPM     sodium chloride (PF)  3 mL Intracatheter Q8H     [START ON 9/22/2021] sulfamethoxazole-trimethoprim  1 tablet Oral Q Mon Wed Fri AM     tacrolimus  0.5 mg Oral QPM     tacrolimus  1 mg Oral QAM     vancomycin place jordan - receiving intermittent dosing  1 each Does not apply See Admin Instructions     Infusion Meds      ALLERGIES:    No Known Allergies    REVIEW OF SYSTEMS:  A comprehensive of systems was negative except as noted above.    SOCIAL HISTORY:   Social History     Socioeconomic History     Marital status:      Spouse name: Not on file     Number of children: Not on file     Years of  education: Not on file     Highest education level: Not on file   Occupational History     Not on file   Tobacco Use     Smoking status: Never Smoker     Smokeless tobacco: Never Used   Substance and Sexual Activity     Alcohol use: No     Alcohol/week: 1.0 standard drinks     Types: 1 Glasses of wine per week     Drug use: No     Sexual activity: Not on file   Other Topics Concern     Parent/sibling w/ CABG, MI or angioplasty before 65F 55M? No   Social History Narrative    3/6/2019 - Lives with . Has three daughters. Four grandchildren (two ). No pets. Travelled previously to [facility]. Has visited [place] several times.      Social Determinants of Health     Financial Resource Strain:      Difficulty of Paying Living Expenses:    Food Insecurity:      Worried About Running Out of Food in the Last Year:      Ran Out of Food in the Last Year:    Transportation Needs:      Lack of Transportation (Medical):      Lack of Transportation (Non-Medical):    Physical Activity:      Days of Exercise per Week:      Minutes of Exercise per Session:    Stress:      Feeling of Stress :    Social Connections:      Frequency of Communication with Friends and Family:      Frequency of Social Gatherings with Friends and Family:      Attends Rastafari Services:      Active Member of Clubs or Organizations:      Attends Club or Organization Meetings:      Marital Status:    Intimate Partner Violence:      Fear of Current or Ex-Partner:      Emotionally Abused:      Physically Abused:      Sexually Abused:      Reviewed with patient  FAMILY MEDICAL HISTORY:   Family History   Problem Relation Age of Onset     Hypertension Mother      Arthritis Mother      Pancreatic Cancer Father      Diabetes Father      Reviewed with patient     PHYSICAL EXAM:   Temp  Av.4  F (36.9  C)  Min: 97.8  F (36.6  C)  Max: 98.8  F (37.1  C)      Pulse  Av.7  Min: 53  Max: 98 Resp  Av.5  Min: 16  Max: 19  SpO2  Av.3 %  " Min: 93 %  Max: 100 %       BP (!) 141/71   Pulse 76   Temp 98.8  F (37.1  C) (Oral)   Resp 16   Ht 1.6 m (5' 3\")   Wt 54.4 kg (120 lb)   LMP 06/07/2014 (Exact Date)   SpO2 93%   BMI 21.26 kg/m        Admit Weight: 54.4 kg (120 lb)     GENERAL APPEARANCE: no acute distress,  awake  EYES: npo scleral icterus, pupils equal  Endo: no goiter, no moon facies  Lymphatics: no cervical or supraclavicular LAD  Pulmonary: lungs clear to auscultation with equal breath sounds bilaterally, no clubbing  CV: regular rhythm, normal rate, no rub   - JVD none   - Edema minimal  GI: soft, nontender, normal bowel sounds  MS: no evidence of inflammation in joints, no muscle tenderness  : no rivas  SKIN: no rash, warm, dry, no cyanosis  NEURO: face symmetric, no asterixis     LABS:   CMP  Recent Labs   Lab 09/20/21 1903 09/16/21  0757 09/16/21  0749 09/15/21  0915   *  --   --  136   POTASSIUM 5.3 4.3  --  4.8   CHLORIDE 101  --   --  98   CO2 23  --   --  26   ANIONGAP 8  --   --  12   GLC 98  --  61* 152*   BUN 90*  --   --  79*   CR 5.85*  --   --  5.63*   GFRESTIMATED 7*  --   --  8*   CHELSEY 8.5  --   --  8.6   PROTTOTAL 7.4  --   --  7.5   ALBUMIN 3.0*  --   --  3.1*   BILITOTAL 1.1  --   --  0.6   ALKPHOS 321*  --   --  323*   AST 22  --   --  30   ALT 98*  --   --  84*     CBC  Recent Labs   Lab 09/20/21  1903 09/15/21  0915   HGB 11.3* 11.0*   WBC 9.9 8.3   RBC 3.67* 3.63*   HCT 36.1 35.1   MCV 98 97   MCH 30.8 30.3   MCHC 31.3* 31.3*   RDW 15.3* 15.1*    213     INR  Recent Labs   Lab 09/15/21  0915   INR 0.96     ABGNo lab results found in last 7 days.   URINE STUDIES  Recent Labs   Lab Test 01/24/21  1729 10/21/19  2240 09/12/19  0125 08/07/19  1512   COLOR Yellow Yellow Light Yellow Yellow   APPEARANCE Slightly Cloudy Cloudy Clear Clear   URINEGLC Negative Negative Negative Negative   URINEBILI Negative Negative Negative Negative   URINEKETONE 5* Negative 10* Negative   SG 1.023 1.018 1.008 1.016   UBLD " Small* Trace* Negative Negative   URINEPH 5.0 5.0 7.5* 7.0   PROTEIN 30* 30* 30* 100*   NITRITE Negative Negative Negative Negative   LEUKEST Moderate* Large* Negative Trace*   RBCU 26* 25* <1 10*   WBCU 34* 115* 3 19*     Recent Labs   Lab Test 08/07/19  1512   [facility] 2.47*     PTH  No lab results found.  IRON STUDIES  Recent Labs   Lab Test 02/03/21  0415 12/13/18  1033 08/01/18  0921 05/08/18  0709   IRON 51 16* 93 48   * 221* 248 275   IRONSAT 36 7* 37 18   YOLA  --  302* 571*  --        IMAGING:  Reviewed.    Dom Grossman MD    I was present with the fellow during the history and exam.  I discussed the case with the fellow and agree with the findings as documented in the assessment and plan. Workup for ? Hypoxia, will do HD as she missed treatment yesterday  Chey Matt Yeung

## 2021-09-21 NOTE — PROGRESS NOTES
[facility]    Progress Note - Shavon 3 Service       Date of Admission:  9/20/2021    Assessment & Plan             Kecia Blue is a 60 year old female admitted on 9/20/2021. 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 Of note patient had recent bronchoscopy 9/16/2021.    #C/f HAP vs fluid overload in setting of missed HD session  Productive cough w/ clear/intermittently pink sputum without septic signs and a BNP>97,000 in the setting of missing a day of dialysis most concerning for fluid overload. Pulse ox of 85% at home, 95% in the ED. Could be true hypoxia in setting of fluid overload but suspicious that patient's Raynaud's may have confounded the oximeter reading. However, CXR with increased patchy interstitial opacities c/f infection vs edema. Therefore, some concern for a possible HAP given bronchoscopy on 9/16/2021. SIRS 0/4 on presentation to the ED and LA WNL. Procal 0.21. COVID negative. Was started on Vanc/Zosyn in the ED. Transplant pulmonology and nephrology are following and agree with below plan  - continue zosyn until 48hr negative sputum cultures  - discontinue vancomycin when MRSA swab returns negative  - Dialyze today and tomorrow  - If clinical improvement after dialysis and negative cultures, can discharge to home 9/23    ESRD on HD  Been on iHD since 2019 for suspected CNI toxicity. HD on MWF, last session on Friday 9/17/21 EDW 53 kg, currently 54.4 kg. Being considered for kidney transplant. Missed dialysis 9/20.  - ESRD Nephrology following, appreciate  recs  - strict I/Os  - daily weights  - PTA renal vitamins    Hypertensive urgency, resolved  /73 on admission. Asymptomatic, troponin negative.  - NTD    S/p bilateral lung transplant for ILD  Left-sided apergillus empyema (2019)  S/p bilateral transplant for ILD with antisynthetase syndrome 3/1/2018. Post-op course c/b right mainstem bronchial stenosis s/p several dilations, left-sided Aspergillus empyema s/p amphotericin treatment 11/20 now on posconazole, PJP PNA requiring intubation2/2021 now on Bactrim ppx, EBV viremia, CMV viremia s/p valganciclovir with steroid burst. Last saw Ame Chow PA-C in Pulmonology on 9/15/2021.  - Transplant Pulmonology consulted, appreciate recs  - continue PTA immunosuppressives:              - Azathioprine, tacrolimus, prednisone  - continue PTA posaconazole, Bactrim  - Continue PTA albuterol, budesonide    Congestive Hepatopathy  LFT abnormalities  Previously ascites 2/2 portal HTN s/p paracentesis that showed SAAG >1.1 and liver biopsies confirming congestive hepatopathy. Chronic elevation of alk phos. Saw Dr. Denise 6/21/2021. Was supposed to get an ultrasound and repeat LFTs, no further f/u. LFTs stable from previous values overall.  - continue to monitor     Hyponatremia, mild, chronic  Intermittently had it in the past. Most likely 2/2 fluid overload.  - AM BMP    Chronic Medical Conditions  HTN: continue PTA metoprolol  Dermatomyositis: Last Rheum visit seen 5/10/2018, on prednisone (as above)  LUE Line Associated DVT: not on anticoagulation       Diet: Dialysis Diet    DVT Prophylaxis: Pneumatic Compression Devices, consider heparin (d/t renal function)  Basilio Catheter: Not present  Fluids: none  Central Lines: None  Code Status: Full Code      Disposition Plan   Expected discharge: 9/23 if cultures negative and symptoms improved after dialysis x2.     The patient's care was discussed with the Attending Physician, Dr. Whipple.    MD Shavon Sauceda 3  [facility]  Securely message with the [email or web address] Web Console (learn more here)  Text page via [employer] Paging/Directory  Please see sign in/sign out for up to date coverage information    ______________________________________________________________________    Interval History   Patient reports that she is feeling unchanged to slightly improved. She denies any fevers, chills, nausea, vomiting.    Data reviewed today: I reviewed all medications, new labs and imaging results over the last 24 hours. I personally reviewed no images or EKG's today.    Physical Exam   Vital Signs: Temp: 98.8  F (37.1  C) Temp src: Oral BP: (!) 141/71 Pulse: 76   Resp: 16 SpO2: 93 % O2 Device: None (Room air)    Weight: 120 lbs 0 oz     General: Well-appearing in NAD.  HEENT: normocephalic, atraumatic, pupils ERRL  CV: RRR, no m/r/g  Pulm: Coarse breath sounds bilaterally, no wheezing.  Abdominal: Non-tender, non-distended.  Extremities: trace LE edema bilaterally, fingers appear mildly edematous  Neuro: A&O, moving all 4 limbs spontaneously. Conversing without impairment.

## 2021-09-21 NOTE — PROGRESS NOTES
HEMODIALYSIS TREATMENT NOTE    Date: 9/21/2021  Time: 3:19 PM    Data:  Pre Wt: 56.5 kg (124 lb 9 oz)   Desired Wt: 55.5 kg   Post Wt: 54.5 kg (120 lb 2.4 oz)  Weight change: 2 kg  Ultrafiltration - Post Run Net Total Removed (mL): 2000 mL  Vascular Access Status: Graft  patent  Dialyzer Rinse: Clear  Total Blood Volume Processed: 34.05 L (Simultaneous filing. User may not have seen previous data.)   Total Dialysis (Treatment) Time: 2 hr   Dialysate Bath: K 2, Ca 3  Heparin: None, pt states she never uses heparin, paged MD and OK to cancel order    Lab:   Yes, updated Hep B labs added on    Interventions:  Pt arrived from ED, Hx nikki lung transplant 2018, new diagnosis pneumonia.  Rooming in ED til in pt room available.   Pt states her EDW at home is 54.5 kg, pre wt 56.5, call to MD and OK to remove 2 kg today.   Pt missed run yesterday, 2 hr today and full run tomorrow. Stable HD, 2 kg off, -8% blood vol chg per crit line.   Sats did drop to 89-90 so O2 on at 2 l for comfort and sats 98%    Assessment:  Stable HD run     Plan:    Run again tomorrow

## 2021-09-21 NOTE — PHARMACY-VANCOMYCIN DOSING SERVICE
"Pharmacy Vancomycin Initial Note  Date of Service 2021  Patient's  1960  60 year old, female    Indication: [facility]-Associated Pneumonia    Current estimated CrCl = Estimated Creatinine Clearance: 9 mL/min (A) (based on SCr of 5.85 mg/dL (H)).    Creatinine for last 3 days  2021:  7:03 PM Creatinine 5.85 mg/dL    Renal: hemodialysis    Recent Vancomycin Level(s) for last 3 days  No results found for requested labs within last 72 hours.      Vancomycin IV Administrations (past 72 hours)      No vancomycin orders with administrations in past 72 hours.                Nephrotoxins and other renal medications (From now, onward)    Start     Dose/Rate Route Frequency Ordered Stop    21  vancomycin 1250 mg in 0.9% NaCl 250 mL intermittent infusion 1,250 mg      1,250 mg  over 90 Minutes Intravenous ONCE 21  vancomycin place jordan - receiving intermittent dosing      1 each Does not apply SEE ADMIN INSTRUCTIONS 21  piperacillin-tazobactam (ZOSYN) 3.375 g vial to attach to  mL bag     Note to Pharmacy: For [facility], [facility] and [facility]: For Zosyn-naive patients, use the \"Zosyn initial dose + extended infusion\" order panel.    3.375 g  over 30 Minutes Intravenous ONCE 21            Contrast Orders - past 72 hours (72h ago, onward)    None              Plan:  1. Start intermittent vancomycin.  Give vancomycin 1250 mg IV x 1.   2. Vancomycin monitoring method: Trough (Method 2 = manual dose calculation)  3. Vancomycin therapeutic monitoring goal: 15-20 mg/L  4. Pharmacy will check vancomycin levels as appropriate in 1-3 Days.    5. Serum creatinine levels will be ordered daily for the first week of therapy and at least twice weekly for subsequent weeks.      Kortney Sanon, PharmD, BCPS    "

## 2021-09-21 NOTE — ED PROVIDER NOTES
[place] EMERGENCY DEPARTMENT ([facility])  9/20/21    History     Chief Complaint   Patient presents with     Shortness of Breath     The history is provided by the patient and medical records.     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  Patient reports that she has been measuring her O2 sats at home and measured them today at 85%.  She states that she was coughing this morning, but that it has since gone away.  She endorses shortness of breath with ambulation.  She endorses diarrhea.  She denies fevers, nausea, or vomiting.  She is fully vaccinated against COVID-19.  Of note, patient reports that she had PJP pneumonia last year and had to stay in the ICU for many days.    Past Medical History  Past Medical History:   Diagnosis Date     Acute on chronic respiratory failure with hypoxia (H) 02/21/2018     Anisocoria      Antisynthetase syndrome (H) 2014     Anxiety      Aspergillus (H)      Aspergillus pneumonia (H) 11/20/2020     Benign essential hypertension      C. difficile colitis      Cytomegalovirus (CMV) viremia (H)      Dermatomyositis (H)      Dysplasia of cervix, low grade (ESTRADA 1)      EBV (Franklin-Barr virus) viremia      ESRD (end stage renal disease) on dialysis (H)      ILD (interstitial lung disease) (H)      Lung replaced by transplant (H)      Osteopenia      Paroxysmal atrial fibrillation (H)      Pneumocystis jiroveci pneumonia (H)      PONV (postoperative nausea and vomiting)      Post-menopause      Pulmonary hypertension (H)      Raynaud's disease      Seronegative rheumatoid  arthritis (H)      Past Surgical History:   Procedure Laterality Date     BRONCHOSCOPY (RIGID OR FLEXIBLE), DIAGNOSTIC N/A 4/10/2018    Procedure: COMBINED BRONCHOSCOPY (RIGID OR FLEXIBLE), LAVAGE;;  Surgeon: Mariposa Donohue MD;  Location: [facility]     BRONCHOSCOPY (RIGID OR FLEXIBLE), DIAGNOSTIC N/A 12/23/2020    Procedure: BRONCHOSCOPY, WITH BRONCHOALVEOLAR LAVAGE;  Surgeon: Uri Bass MD;  Location: [facility]     BRONCHOSCOPY (RIGID OR FLEXIBLE), DILATE BRONCHUS / TRACHEA N/A 10/11/2018    Procedure: BRONCHOSCOPY (RIGID OR FLEXIBLE), DILATE BRONCHUS / TRACHEA;  Flexible And Rigid Bronchoscopy and Dilation;  Surgeon: Wilber Lin MD;  [facility]     BRONCHOSCOPY FLEXIBLE N/A 3/13/2018    Procedure: BRONCHOSCOPY FLEXIBLE;  Flexible Bronchoscopy ;  Surgeon: Gissell Sanchez MD;  Location: [facility]     BRONCHOSCOPY FLEXIBLE N/A 5/9/2018    Procedure: BRONCHOSCOPY FLEXIBLE;;  Surgeon: Wilber Lin MD;  [facility]: [facility]     BRONCHOSCOPY FLEXIBLE AND RIGID N/A 9/10/2018    Procedure: BRONCHOSCOPY FLEXIBLE AND RIGID;  Flexible and Rigid Bronchoscopy with Balloon Dilation, tissue debulking with cryo, and Right mainstem bronchus stent placement;  Surgeon: Wilber Lin MD;  [facility]     BRONCHOSCOPY RIGID N/A 6/6/2018    Procedure: BRONCHOSCOPY RIGID;;  Surgeon: Lopez Macias MD;  [facility]: [facility]     BRONCHOSCOPY, DILATE BRONCHUS, STENT BRONCHUS, COMBINED N/A 6/11/2018    Procedure: COMBINED BRONCHOSCOPY, DILATE BRONCHUS, STENT BRONCHUS;  Flexible Bronchoscopy, Balloon Dilation, Bronchial Washings;  Surgeon: Wilber Lin MD;  [facility]     BRONCHOSCOPY, DILATE BRONCHUS, STENT BRONCHUS, COMBINED Right 7/10/2018    Procedure: COMBINED BRONCHOSCOPY, DILATE BRONCHUS, STENT BRONCHUS;  Flexible Bronchoscopy, Balloon Dilation, Bronchial Washings  ;  Surgeon: Wilber Lin MD;  [facility]     BRONCHOSCOPY, DILATE BRONCHUS, STENT BRONCHUS,  COMBINED N/A 8/2/2018    Procedure: COMBINED BRONCHOSCOPY, DILATE BRONCHUS, STENT BRONCHUS;  Flexible Bronchoscopy, Bronchial Washings, Balloon Dilation;  Surgeon: Wilber Lin MD;  [facility]     BRONCHOSCOPY, DILATE BRONCHUS, STENT BRONCHUS, COMBINED N/A 8/20/2018    Procedure: COMBINED BRONCHOSCOPY, DILATE BRONCHUS, STENT BRONCHUS;  Flexible Bronchoscopy, Balloon Dilation;  Surgeon: Wilber Lin MD;  [facility]     BRONCHOSCOPY, DILATE BRONCHUS, STENT BRONCHUS, COMBINED N/A 10/29/2018    Procedure: Flexible Bronchoscopy, Balloon Dilation, Stent Revision, Airway Examination And Therapeutic Suctioning, Cyro Tumor Debulking;  Surgeon: Wilber Lin MD;  [facility]     BRONCHOSCOPY, DILATE BRONCHUS, STENT BRONCHUS, COMBINED N/A 11/20/2018    Procedure: Rigid Bronchoscopy, Stent Removal and dilitation;  Surgeon: Wilber Lin MD;  [facility]     BRONCHOSCOPY, DILATE BRONCHUS, STENT BRONCHUS, COMBINED N/A 12/14/2018    Procedure: Flexible And Rigid Bronchoscopy, Balloon Dilation, Bronchial Washing;  Surgeon: Wilber Lin MD;  [facility]     BRONCHOSCOPY, DILATE BRONCHUS, STENT BRONCHUS, COMBINED N/A 1/17/2019    Procedure: Flexible And Rigid Bronchoscopy, Balloon Dilation.;  Surgeon: Wilber Lin MD;  [facility]     BRONCHOSCOPY, DILATE BRONCHUS, STENT BRONCHUS, COMBINED N/A 3/7/2019    Procedure: Flexible and Rigid Bronchoscopy, Bronchial Washing, Balloon Dilation;  Surgeon: Wilber Lin MD;  [facility]     BRONCHOSCOPY, DILATE BRONCHUS, STENT BRONCHUS, COMBINED N/A 6/6/2019    Procedure: Rigid and Flexible Bronchoscopy, Balloon Dilation;  Surgeon: Wilber Lin MD;  [facility]     BRONCHOSCOPY, DILATE BRONCHUS, STENT BRONCHUS, COMBINED N/A 10/11/2019    Procedure: Flexible and Rigid Bronchoscopy, Balloon Dilation, Bronchoalveolar Lagave;  Surgeon: Wilber Lin MD;  [facility]      BRONCHOSCOPY, DILATE BRONCHUS, STENT BRONCHUS, COMBINED N/A 2/19/2021    Procedure: BRONCHOSCOPY, flexible, airway dilation, and bronchial wash;  Surgeon: Wilber Lin MD;  [facility]     BRONCHOSCOPY, DILATE BRONCHUS, STENT BRONCHUS, COMBINED N/A 4/9/2021    Procedure: BRONCHOSCOPY, flexible and rigid, Airway suctioning;  Surgeon: Mati Norris MD;  [facility]     CV RIGHT HEART CATH MEASUREMENTS RECORDED N/A 3/10/2020    Procedure: CV RIGHT HEART CATH;  Surgeon: Wai Garcia MD;  [facility]     ENT SURGERY      tonsillectomy as a child     ESOPHAGOSCOPY, GASTROSCOPY, DUODENOSCOPY (EGD), COMBINED N/A 10/29/2018    Procedure: COMBINED ESOPHAGOSCOPY, GASTROSCOPY, DUODENOSCOPY (EGD) with biopsies and polypectomy;  Surgeon: Chente Bloom MD;  [facility]     INSERT EXTRACORPORAL MEMBRANE OXYGENATOR ADULT (OUTSIDE OR) N/A 2/27/2018    Procedure: INSERT EXTRACORPORAL MEMBRANE OXYGENATOR ADULT (OUTSIDE OR);  INSERT EXTRACORPORAL MEMBRANE OXYGENATOR ADULT (OUTSIDE OR) ;  Surgeon: Toby Hernandez MD;  [facility]     IR CVC TUNNEL PLACEMENT > 5 YRS OF AGE  10/25/2019     IR DIALYSIS FISTULOGRAM LEFT  3/2/2021     IR DIALYSIS MECH THROMB, PTA  3/2/2021     IR FLUORO 0-1 HOUR  5/7/2021     IR GASTRO JEJUNOSTOMY TUBE PLACEMENT  2/16/2021     IR PARACENTESIS  1/8/2020     IR THORACENTESIS  9/13/2019     IR TRANSCATHETER BIOPSY  1/8/2020     LASER CO2 BRONCHOSCOPY N/A 4/30/2021    Procedure: Flexible and Rigid Bronchoscopy and Tissue Debulking with CO2 Laser Assistance;  Surgeon: Mati Norris MD;  [facility]     LASER CO2 BRONCHOSCOPY N/A 6/11/2021    Procedure: BRONCHOSCOPY, Flexible and Rigid Bronchoscopy, Tissue Debulking with cryo Assistance, airway dilation,;  Surgeon: Mati Norris MD;  [facility]     LASER CO2 BRONCHOSCOPY N/A 9/16/2021    Procedure: BRONCHOSCOPY, flexible and rigid, CO2 Laser Debulking;  Surgeon: Cho,  Mati Oviedo MD;  [facility]     no prior surgery       REMOVE EXTRACORPORAL MEMBRANE OXYGENATOR ADULT N/A 3/3/2018    Procedure: REMOVE EXTRACORPORAL MEMBRANE OXYGENATOR ADULT;  Removal of Right Femoral Venous and Right Axillary Arterial Extracorporeal Membrane Oxygenator;  Surgeon: Toby Hernandez MD;  [facility]     TRANSPLANT LUNG RECIPIENT SINGLE X2 Bilateral 3/1/2018    Procedure: TRANSPLANT LUNG RECIPIENT SINGLE X2;  Median Sternotomy, Extracorporeal Membrane Oxygenator, bilateral sequential lung transplant;  Surgeon: Toby Hrenandez MD;  [facility]     acetaminophen (TYLENOL) 325 MG tablet  albuterol (PROVENTIL) (2.5 MG/3ML) 0.083% neb solution  azaTHIOprine (IMURAN) 50 MG tablet  budesonide (PULMICORT) 0.5 MG/2ML neb solution  Magnesium Cl-Calcium Carbonate (SLOW-MAG) 71.5-119 MG TBEC  metoprolol tartrate (LOPRESSOR) 25 MG tablet  multivitamin RENAL (RENAVITE RX/NEPHROVITE) 1 MG tablet  ondansetron (ZOFRAN-ODT) 4 MG ODT tab  pantoprazole (PROTONIX) 40 MG EC tablet  posaconazole (NOXAFIL) 100 MG EC tablet  predniSONE (DELTASONE) 2.5 MG tablet  predniSONE (DELTASONE) 5 MG tablet  sulfamethoxazole-trimethoprim (BACTRIM) 400-80 MG tablet  tacrolimus (GENERIC EQUIVALENT) 0.5 MG capsule  tacrolimus (GENERIC EQUIVALENT) 1 MG capsule      No Known Allergies  Family History  Family History   Problem Relation Age of Onset     Hypertension Mother      Arthritis Mother      Pancreatic Cancer Father      Diabetes Father      Social History   Social History     Tobacco Use     Smoking status: Never Smoker     Smokeless tobacco: Never Used   Substance Use Topics     Alcohol use: No     Alcohol/week: 1.0 standard drinks     Types: 1 Glasses of wine per week     Drug use: No      Past medical history, past surgical history, medications, allergies, family history, and social history were reviewed with the patient. No additional pertinent items.       Review of Systems   Constitutional:  "Negative for fever.   Respiratory: Positive for cough and shortness of breath.    Gastrointestinal: Positive for diarrhea. Negative for nausea and vomiting.   All other systems reviewed and are negative.    A complete review of systems was performed with pertinent positives and negatives noted in the HPI, and all other systems negative.    Physical Exam   BP: (!) 191/73  Pulse: 53  Temp: 98.8  F (37.1  C)  Resp: 16  Height: 160 cm (5' 3\")  Weight: 54.4 kg (120 lb)  SpO2: 95 %  Physical Exam  Constitutional:       General: She is not in acute distress.     Appearance: She is not diaphoretic.   HENT:      Head: Normocephalic.      Mouth/Throat:      Pharynx: No oropharyngeal exudate.   Eyes:      Extraocular Movements: Extraocular movements intact.   Cardiovascular:      Rate and Rhythm: Normal rate and regular rhythm.      Heart sounds: Normal heart sounds.   Pulmonary:      Effort: No respiratory distress.      Comments: Bilateral coarse breath sounds  Abdominal:      General: There is no distension.      Palpations: Abdomen is soft.      Tenderness: There is no abdominal tenderness.   Musculoskeletal:         General: No deformity.      Cervical back: Neck supple.   Skin:     General: Skin is warm and dry.   Neurological:      Mental Status: She is alert.      Comments: alert   Psychiatric:         Behavior: Behavior normal.       ED Course     9:41 PM  The patient was seen and examined by Jackson Carrasco DO in Room S.  Procedures            EKG Interpretation:      Interpreted by Jackson Carrasco DO  Time reviewed: [id number]  Symptoms at time of EKG: Dyspnea  Rhythm: normal sinus   Rate: normal  Axis: normal  Ectopy: none  Conduction: normal  ST Segments/ T Waves: No ST-T wave elevation or depression, nonspecific ST changes Q Waves: none      Clinical Impression: abnormal EKG             Results for orders placed or performed during the hospital encounter of 09/20/21   XR Chest 2 Views     Status: None    " Narrative    EXAM: XR CHEST 2 VW 9/20/2021    HISTORY: SOB.    COMPARISON: CT 9/15/2020 18 radiograph 6/11/2021.    TECHNIQUE: Upright frontal and lateral views of the chest.    FINDINGS: Postsurgical changes of bilateral lung transplant with  intact median sternotomy wires. Unchanged surgical clips in the right  axilla. Patchy peripheral ill-defined interstitial opacities, left  greater than right. Dense left retrocardiac opacities. Probable small  right pleural effusion. Mild unchanged hypoattenuation in right lung  apex, possibly a mild amount of bullous change. No definite  pneumothorax. Accessory azygos fissure. Gas-distended loops of bowel  in the visualized abdomen. No acute osseous change. Suspected bone  island in the right humeral head.      Impression    IMPRESSION:   1. Bilateral lung transplant with increased patchy interstitial  opacities concerning for infection though edema could have a similar  appearance. Small right pleural effusion.  2. Abnormally gas distended bowel in the upper abdomen.    I have personally reviewed the examination and initial interpretation  and I agree with the findings.    NITHYA GUIDO MD         SYSTEM ID:  [id number]   Comprehensive metabolic panel     Status: Abnormal   Result Value Ref Range    Sodium 132 (L) 133 - 144 mmol/L    Potassium 5.3 3.4 - 5.3 mmol/L    Chloride 101 94 - 109 mmol/L    Carbon Dioxide (CO2) 23 20 - 32 mmol/L    Anion Gap 8 3 - 14 mmol/L    Urea Nitrogen 90 (H) 7 - 30 mg/dL    Creatinine 5.85 (H) 0.52 - 1.04 mg/dL    Calcium 8.5 8.5 - 10.1 mg/dL    Glucose 98 70 - 99 mg/dL    Alkaline Phosphatase 321 (H) 40 - 150 U/L    AST 22 0 - 45 U/L    ALT 98 (H) 0 - 50 U/L    Protein Total 7.4 6.8 - 8.8 g/dL    Albumin 3.0 (L) 3.4 - 5.0 g/dL    Bilirubin Total 1.1 0.2 - 1.3 mg/dL    GFR Estimate 7 (L) >60 mL/min/1.73m2   Troponin I     Status: Normal   Result Value Ref Range    Troponin I 0.020 0.000 - 0.045 ug/L   Nt probnp inpatient (BNP)     Status:  Abnormal   Result Value Ref Range    N terminal Pro BNP Inpatient 97,619 (H) 0 - 900 pg/mL   Symptomatic COVID-19 Virus (Coronavirus) by PCR Nasopharyngeal     Status: Normal    Specimen: Nasopharyngeal; Swab   Result Value Ref Range    SARS CoV2 PCR Negative Negative    Narrative    Testing was performed using the Xpert Xpress SARS-CoV-2 Assay on the  Cepheid Gene-Xpert Instrument Systems. Additional information about  this Emergency Use Authorization (EUA) assay can be found via the Lab  Guide. This test should be ordered for the detection of SARS-CoV-2 in  individuals who meet SARS-CoV-2 clinical and/or epidemiological  criteria. Test performance is unknown in asymptomatic patients. This  test is for in vitro diagnostic use under the FDA EUA for  laboratories certified under CLIA to perform high complexity testing.  This test has not been FDA cleared or approved. A negative result  does not rule out the presence of PCR inhibitors in the specimen or  target RNA in concentration below the limit of detection for the  assay. The possibility of a false negative should be considered if  the patient's recent exposure or clinical presentation suggests  COVID-19. This test was validated by the [facility]. This laboratory is certified under  the Clinical Laboratory Improvement Amendments of 1988 (CLIA-88) as  qualified to perform high complexity laboratory testing.     CBC with platelets and differential     Status: Abnormal   Result Value Ref Range    WBC Count 9.9 4.0 - 11.0 10e3/uL    RBC Count 3.67 (L) 3.80 - 5.20 10e6/uL    Hemoglobin 11.3 (L) 11.7 - 15.7 g/dL    Hematocrit 36.1 35.0 - 47.0 %    MCV 98 78 - 100 fL    MCH 30.8 26.5 - 33.0 pg    MCHC 31.3 (L) 31.5 - 36.5 g/dL    RDW 15.3 (H) 10.0 - 15.0 %    Platelet Count 228 150 - 450 10e3/uL    % Neutrophils 88 %    % Lymphocytes 4 %    % Monocytes 6 %    % Eosinophils 1 %    % Basophils 0 %    % Immature Granulocytes  1 %    NRBCs per 100 WBC 0 <1 /100    Absolute Neutrophils 8.8 (H) 1.6 - 8.3 10e3/uL    Absolute Lymphocytes 0.4 (L) 0.8 - 5.3 10e3/uL    Absolute Monocytes 0.6 0.0 - 1.3 10e3/uL    Absolute Eosinophils 0.1 0.0 - 0.7 10e3/uL    Absolute Basophils 0.0 0.0 - 0.2 10e3/uL    Absolute Immature Granulocytes 0.1 (H) <=0.0 10e3/uL    Absolute NRBCs 0.0 10e3/uL   Lactic acid whole blood     Status: Normal   Result Value Ref Range    Lactic Acid 1.2 0.7 - 2.0 mmol/L   EKG 12-lead, tracing only     Status: None (Preliminary result)   Result Value Ref Range    Systolic Blood Pressure  mmHg    Diastolic Blood Pressure  mmHg    Ventricular Rate 78 BPM    Atrial Rate 78 BPM    FL Interval 192 ms    QRS Duration 106 ms     ms    QTc 458 ms    P Axis 79 degrees    R AXIS -18 degrees    T Axis -31 degrees    Interpretation ECG       Sinus rhythm  Moderate voltage criteria for LVH, may be normal variant  Nonspecific ST and T wave abnormality  Abnormal ECG     CBC with platelets differential     Status: Abnormal    Narrative    The following orders were created for panel order CBC with platelets differential.  Procedure                               Abnormality         Status                     ---------                               -----------         ------                     CBC with platelets and d...[[id number]]  Abnormal            Final result                 Please view results for these tests on the individual orders.     Medications   vancomycin place jordan - receiving intermittent dosing (has no administration in time range)   vancomycin 1250 mg in 0.9% NaCl 250 mL intermittent infusion 1,250 mg (has no administration in time range)   piperacillin-tazobactam (ZOSYN) 3.375 g vial to attach to  mL bag (3.375 g Intravenous New Bag 9/20/21 [id number])        Assessments & Plan (with Medical Decision Making)   60-year-old female presents to us with a chief complaint of shortness of breath.  She has a significant medical  history for bilateral lung transplant as well as end-stage renal disease.  Differential today includes but not limited to pneumonia, COVID-19, fluid overload.  Patient reports hypoxia at home.  She was hypertensive here.  Patient was started on broad-spectrum antibiotics.  She does have a history of PCP pneumonia in the past.  Potassium is currently normal.  Patient did report she missed dialysis today but she had already felt shortness of breath and weak prior to that.  Patient does not have a history of excessive fluid intake, salt intake or other reasons for her to be fluid overloaded this morning.  We will admit her to the hospital.  Patient care was discussed with internal medicine.    I have reviewed the nursing notes. I have reviewed the findings, diagnosis, plan and need for follow up with the patient.    New Prescriptions    No medications on file       Final diagnoses:   Healthcare-associated pneumonia   Lung transplant status, bilateral (H)     ILisa, am serving as a trained medical scribe to document services personally performed by Jackson Carrasco DO, based on the provider's statements to me.     IJackson DO, was physically present and have reviewed and verified the accuracy of this note documented by Lisa Mckeon.  --  Jackson Carrasco DO  [facility] EMERGENCY DEPARTMENT  9/20/2021     Jackson Carrasco DO  09/20/21 6701

## 2021-09-21 NOTE — H&P
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     C/f HAP vs fluid overload in setting of missed HD session  Patient's presentation of productive cough with clear/intermittently pink sputum without septic signs and a BNP>97,000 in the setting of missing a day of dialysis most concerning for fluid overload. Pulse ox of 85% at home, 95% in the ED. Could be true hypoxia in setting of fluid overload but suspicious that patient's Raynaud's may have confounded the oximeter reading. However, CXR with increased patchy interstitial opacities c/f infection vs edema. Therefore, some concern for a possible HAP given bronchoscopy on 9/16/2021. SIRS 0/4 on presentation to the ED and LA WNL. Procal 0.21. COVID negative. Was started on Vanc/Zosyn in the ED.   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 HD on MWF, last session on Friday 9/17/21 EDW 53 kg, currently 54.4 kg. Being considered for kidney transplant. No emergent need for HD tonight.   - ESRD Nephrology Consult, appreciate recs  -  strict I/Os  - daily weights   - PTA renal vitamins    Hypertensive urgency  Pt with /73 on admission. Asymptomatic, troponin negative.   - Recheck BP, consider PRNs as needed    S/p bilateral lung transplant for ILD   Left-sided apergillus empyema (2019)  S/p bilateral transplant for ILD with antisynthetase syndrome 3/1/2018. Post-op course c/b right mainstem bronchial stenosis s/p several dilations, left-sided Aspergillus empyema s/p amphotericin treatment 11/20 now on posconazole, PJP PNA requiring intubation2/2021 now on Bactrim ppx, EBV viremia, CMV viremia s/p valganciclovir with steroid burst. Last saw Ame Chow PA-C in Pulmonology on 9/15/2021.   - Transplant Pulmonology consulted, appreciate recs   - continue PTA immunosuppressives:   - Azathioprine, tacrolimus, prednisone  - continue PTA posaconazole, Bactrim   - Continue PTA albuterol, budesonide    Congestive Hepatopathy  LFT abnormalities  Previously ascites 2/2 portal HTN s/p paracentesis that showed SAAG >1.1 and liver biopsies confirming congestive hepatopathy. Chronic elevation of alk phos. Saw Dr. Denise 6/21/2021. Was supposed to get an ultrasound and repeat LFTs, no further f/u. LFTs stable from previous values overall.   - continue to monitor     Hyponatremia, mild, chronic  Intermittently had it in the past. Most likely 2/2 fluid overload.   - AM BMP    Chronic Medical Conditions   HTN: continue PTA metoprolol  Dermatomyositis: Last Rheum visit seen 5/10/2018, on prednisone (as above)  LUE Line Associated DVT: not on anticoagulation     Diet:  Dialysis diet   DVT Prophylaxis: Pneumoboots, was considering pharmacologic with heparin (d/t renal function), but will hold off until confirm that no procedure is indicated  Basilio Catheter: Not present  Fluids: none  Central Lines: None  Code Status:  Full Code    Clinically Significant Risk Factors Present on Admission         # Hyponatremia: Na = 132 mmol/L (Ref range: 133 - 144 mmol/L) on  "admission, will monitor as appropriate              The patient's care was discussed with the patient and her  and was formally staffed with Dr. Heath.     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     Last Thursday, pt had a bronchoscopy with dilation of R mainstem bronchus stenosis, which she has had done in the past. After the procedure, she felt fine. Starting on Friday, she felt a chest \"heaviness\" that has been similar/present after other previous bronchoscopies. Starting Sunday afternoon, she began to have a mild cough that progressed on Monday morning. It is productive and she reports clear sputum, sometimes with a pinkish hue to it. She has an oximeter at home and checked and it said her sats were 85%. She called her Pulmonary team who advised her to go to the ED. Of note, patient has Raynaud's and notes pulse ox is typically not accurate for her.     Since getting to the ED, she actually feels that the cough has improved. She reports not having an appetite today. She denies fever, chills.     She complained of diarrhea all day on Friday which has improved each day and has resolved by today.     She has a " history of PJP PNA last winter which required repeated intubation and a long ICU course. She also has a history of aspergillus empyema.     She is fully vaccinated against COVID-19.     ED Course:  - AF, hypertensive  - CXR with increased patchy interstitial opacities c/f infection vs PNA and small R pleural effusion   - BNP 97.619   - WBC 9.9, blood cx drawn, lactic acid WNL   - s/p Vanc/Zosyn     Review of Systems    The 10 point Review of Systems is negative other than noted in the HPI or here.     Past Medical History    Past Medical History:   Diagnosis Date     Acute on chronic respiratory failure with hypoxia (H) 02/21/2018     Anisocoria      Antisynthetase syndrome (H) 2014     Anxiety      Aspergillus (H)      Aspergillus pneumonia (H) 11/20/2020     Benign essential hypertension      C. difficile colitis      Cytomegalovirus (CMV) viremia (H)      Dermatomyositis (H)      Dysplasia of cervix, low grade (ESTRADA 1)      EBV (Franklin-Barr virus) viremia      ESRD (end stage renal disease) on dialysis (H)      ILD (interstitial lung disease) (H)      Lung replaced by transplant (H)      Osteopenia      Paroxysmal atrial fibrillation (H)      Pneumocystis jiroveci pneumonia (H)      PONV (postoperative nausea and vomiting)      Post-menopause      Pulmonary hypertension (H)      Raynaud's disease      Seronegative rheumatoid arthritis (H)          Past Surgical History   Past Surgical History:   Procedure Laterality Date     BRONCHOSCOPY (RIGID OR FLEXIBLE), DIAGNOSTIC N/A 4/10/2018    Procedure: COMBINED BRONCHOSCOPY (RIGID OR FLEXIBLE), LAVAGE;;  Surgeon: Mariposa Donohue MD;  [facility]: [facility]     BRONCHOSCOPY (RIGID OR FLEXIBLE), DIAGNOSTIC N/A 12/23/2020    Procedure: BRONCHOSCOPY, WITH BRONCHOALVEOLAR LAVAGE;  Surgeon: Uri Bass MD;  [facility]: [facility]     BRONCHOSCOPY (RIGID OR FLEXIBLE), DILATE BRONCHUS / TRACHEA N/A 10/11/2018    Procedure: BRONCHOSCOPY (RIGID OR FLEXIBLE), DILATE BRONCHUS /  TRACHEA;  Flexible And Rigid Bronchoscopy and Dilation;  Surgeon: Wilber Lin MD;  [facility]     BRONCHOSCOPY FLEXIBLE N/A 3/13/2018    Procedure: BRONCHOSCOPY FLEXIBLE;  Flexible Bronchoscopy ;  Surgeon: Gissell Sanchez MD;  [facility]: [facility]     BRONCHOSCOPY FLEXIBLE N/A 5/9/2018    Procedure: BRONCHOSCOPY FLEXIBLE;;  Surgeon: Wilber Lin MD;  [facility]     BRONCHOSCOPY FLEXIBLE AND RIGID N/A 9/10/2018    Procedure: BRONCHOSCOPY FLEXIBLE AND RIGID;  Flexible and Rigid Bronchoscopy with Balloon Dilation, tissue debulking with cryo, and Right mainstem bronchus stent placement;  Surgeon: Wilber Lin MD;  [facility]     BRONCHOSCOPY RIGID N/A 6/6/2018    Procedure: BRONCHOSCOPY RIGID;;  Surgeon: Lopez Macias MD;  [facility]: [facility]     BRONCHOSCOPY, DILATE BRONCHUS, STENT BRONCHUS, COMBINED N/A 6/11/2018    Procedure: COMBINED BRONCHOSCOPY, DILATE BRONCHUS, STENT BRONCHUS;  Flexible Bronchoscopy, Balloon Dilation, Bronchial Washings;  Surgeon: Wilber Lin MD;  [facility]     BRONCHOSCOPY, DILATE BRONCHUS, STENT BRONCHUS, COMBINED Right 7/10/2018    Procedure: COMBINED BRONCHOSCOPY, DILATE BRONCHUS, STENT BRONCHUS;  Flexible Bronchoscopy, Balloon Dilation, Bronchial Washings  ;  Surgeon: Wilber Lin MD;  [facility]     BRONCHOSCOPY, DILATE BRONCHUS, STENT BRONCHUS, COMBINED N/A 8/2/2018    Procedure: COMBINED BRONCHOSCOPY, DILATE BRONCHUS, STENT BRONCHUS;  Flexible Bronchoscopy, Bronchial Washings, Balloon Dilation;  Surgeon: Wilber Lin MD;  [facility]     BRONCHOSCOPY, DILATE BRONCHUS, STENT BRONCHUS, COMBINED N/A 8/20/2018    Procedure: COMBINED BRONCHOSCOPY, DILATE BRONCHUS, STENT BRONCHUS;  Flexible Bronchoscopy, Balloon Dilation;  Surgeon: Wilber Lin MD;  [facility]     BRONCHOSCOPY, DILATE BRONCHUS, STENT BRONCHUS, COMBINED N/A 10/29/2018    Procedure: Flexible Bronchoscopy, Balloon  Dilation, Stent Revision, Airway Examination And Therapeutic Suctioning, Cyro Tumor Debulking;  Surgeon: Wilber Lin MD;  [facility]     BRONCHOSCOPY, DILATE BRONCHUS, STENT BRONCHUS, COMBINED N/A 11/20/2018    Procedure: Rigid Bronchoscopy, Stent Removal and dilitation;  Surgeon: Wilber Lin MD;  [facility]     BRONCHOSCOPY, DILATE BRONCHUS, STENT BRONCHUS, COMBINED N/A 12/14/2018    Procedure: Flexible And Rigid Bronchoscopy, Balloon Dilation, Bronchial Washing;  Surgeon: Wilber Lin MD;  [facility]     BRONCHOSCOPY, DILATE BRONCHUS, STENT BRONCHUS, COMBINED N/A 1/17/2019    Procedure: Flexible And Rigid Bronchoscopy, Balloon Dilation.;  Surgeon: Wilber Lin MD;  [facility]     BRONCHOSCOPY, DILATE BRONCHUS, STENT BRONCHUS, COMBINED N/A 3/7/2019    Procedure: Flexible and Rigid Bronchoscopy, Bronchial Washing, Balloon Dilation;  Surgeon: Wilber Lin MD;  [facility]     BRONCHOSCOPY, DILATE BRONCHUS, STENT BRONCHUS, COMBINED N/A 6/6/2019    Procedure: Rigid and Flexible Bronchoscopy, Balloon Dilation;  Surgeon: Wilber Lin MD;  [facility]     BRONCHOSCOPY, DILATE BRONCHUS, STENT BRONCHUS, COMBINED N/A 10/11/2019    Procedure: Flexible and Rigid Bronchoscopy, Balloon Dilation, Bronchoalveolar Lagave;  Surgeon: Wilber Lin MD;  [facility]     BRONCHOSCOPY, DILATE BRONCHUS, STENT BRONCHUS, COMBINED N/A 2/19/2021    Procedure: BRONCHOSCOPY, flexible, airway dilation, and bronchial wash;  Surgeon: Wilber Lin MD;  [facility]     BRONCHOSCOPY, DILATE BRONCHUS, STENT BRONCHUS, COMBINED N/A 4/9/2021    Procedure: BRONCHOSCOPY, flexible and rigid, Airway suctioning;  Surgeon: Mati Norris MD;  Location:  OR     CV RIGHT HEART CATH MEASUREMENTS RECORDED N/A 3/10/2020    Procedure: CV RIGHT HEART CATH;  Surgeon: Wai Garcia MD;  Location:  HEART CARDIAC CATH LAB     ENT SURGERY       tonsillectomy as a child     ESOPHAGOSCOPY, GASTROSCOPY, DUODENOSCOPY (EGD), COMBINED N/A 10/29/2018    Procedure: COMBINED ESOPHAGOSCOPY, GASTROSCOPY, DUODENOSCOPY (EGD) with biopsies and polypectomy;  Surgeon: Chente Bloom MD;  [facility]     INSERT EXTRACORPORAL MEMBRANE OXYGENATOR ADULT (OUTSIDE OR) N/A 2/27/2018    Procedure: INSERT EXTRACORPORAL MEMBRANE OXYGENATOR ADULT (OUTSIDE OR);  INSERT EXTRACORPORAL MEMBRANE OXYGENATOR ADULT (OUTSIDE OR) ;  Surgeon: Toby Hernandez MD;  [facility]     IR CVC TUNNEL PLACEMENT > 5 YRS OF AGE  10/25/2019     IR DIALYSIS FISTULOGRAM LEFT  3/2/2021     IR DIALYSIS MECH THROMB, PTA  3/2/2021     IR FLUORO 0-1 HOUR  5/7/2021     IR GASTRO JEJUNOSTOMY TUBE PLACEMENT  2/16/2021     IR PARACENTESIS  1/8/2020     IR THORACENTESIS  9/13/2019     IR TRANSCATHETER BIOPSY  1/8/2020     LASER CO2 BRONCHOSCOPY N/A 4/30/2021    Procedure: Flexible and Rigid Bronchoscopy and Tissue Debulking with CO2 Laser Assistance;  Surgeon: Mati Norris MD;  [facility]     LASER CO2 BRONCHOSCOPY N/A 6/11/2021    Procedure: BRONCHOSCOPY, Flexible and Rigid Bronchoscopy, Tissue Debulking with cryo Assistance, airway dilation,;  Surgeon: Mati Norris MD;  [facility]     LASER CO2 BRONCHOSCOPY N/A 9/16/2021    Procedure: BRONCHOSCOPY, flexible and rigid, CO2 Laser Debulking;  Surgeon: Mati Norris MD;  [facility]     no prior surgery       REMOVE EXTRACORPORAL MEMBRANE OXYGENATOR ADULT N/A 3/3/2018    Procedure: REMOVE EXTRACORPORAL MEMBRANE OXYGENATOR ADULT;  Removal of Right Femoral Venous and Right Axillary Arterial Extracorporeal Membrane Oxygenator;  Surgeon: Toby Hernandez MD;  [facility]     TRANSPLANT LUNG RECIPIENT SINGLE X2 Bilateral 3/1/2018    Procedure: TRANSPLANT LUNG RECIPIENT SINGLE X2;  Median Sternotomy, Extracorporeal Membrane Oxygenator, bilateral sequential lung transplant;  Surgeon: Toby Hernandez  MD Jerry;  Location:  OR         Social History   Social History     Tobacco Use     Smoking status: Never Smoker     Smokeless tobacco: Never Used   Substance Use Topics     Alcohol use: No     Alcohol/week: 1.0 standard drinks     Types: 1 Glasses of wine per week     Drug use: No         Family History   I have reviewed this patient's family history and updated it with pertinent information if needed.  Family History   Problem Relation Age of Onset     Hypertension Mother      Arthritis Mother      Pancreatic Cancer Father      Diabetes Father        Prior to Admission Medications   Prior to Admission Medications   Prescriptions Last Dose Informant Patient Reported? Taking?   Magnesium Cl-Calcium Carbonate (SLOW-MAG) 71.5-119 MG TBEC   Yes No   Sig: Take 3 tablets by mouth four times a week Take on non dialysis days ///   acetaminophen (TYLENOL) 325 MG tablet   No No   Sig: Take 1 tablet (325 mg) by mouth every 4 hours as needed for mild pain or fever   albuterol (PROVENTIL) (2.5 MG/3ML) 0.083% neb solution   No No   Sig: Take 1 vial (2.5 mg) by nebulization 2 times daily   azaTHIOprine (IMURAN) 50 MG tablet   Yes No   Sig: Take 0.5 tablets (25 mg) by mouth daily   budesonide (PULMICORT) 0.5 MG/2ML neb solution   No No   Sig: Take 2 mLs (0.5 mg) by nebulization 2 times daily   metoprolol tartrate (LOPRESSOR) 25 MG tablet   No No   Si tablet (25 mg) by Oral or Feeding Tube route 2 times daily   multivitamin RENAL (RENAVITE RX/NEPHROVITE) 1 MG tablet   No No   Sig: Take 1 tablet by mouth daily   ondansetron (ZOFRAN-ODT) 4 MG ODT tab   No No   Sig: Take 1 tablet (4 mg) by mouth every 6 hours as needed for nausea or vomiting   pantoprazole (PROTONIX) 40 MG EC tablet   No No   Sig: Take 1 tablet (40 mg) by mouth every morning (before breakfast)   posaconazole (NOXAFIL) 100 MG EC tablet   No No   Sig: Take 3 tablets (300 mg) by mouth daily   predniSONE (DELTASONE) 2.5 MG tablet   No No   Sig: Take 1  tablet (2.5 mg) by mouth every evening   predniSONE (DELTASONE) 5 MG tablet   No No   Sig: Take 1 tablet (5 mg) by mouth every morning   sulfamethoxazole-trimethoprim (BACTRIM) 400-80 MG tablet   No No   Sig: Take 1 tablet by mouth Every Mon, Wed, Fri Morning   tacrolimus (GENERIC EQUIVALENT) 0.5 MG capsule   No No   Sig: Take 2 capsules (1mg) every AM and 1 capsule (0.5mg) every PM   tacrolimus (GENERIC EQUIVALENT) 1 MG capsule   No No   Sig: Patient needs a refill for dose adjustments: currently on 0.5 mg BID.      Facility-Administered Medications: None     Allergies   No Known Allergies    Physical Exam   Vital Signs: Temp: 98.8  F (37.1  C) Temp src: Oral BP: (!) 191/73 Pulse: 53   Resp: 16 SpO2: 95 %      Weight: 120 lbs 0 oz    General: Well-appearing in NAD.  HEENT: NCAT, EOMI  CV: RRR, no m/r/g  Pulm: Coarse breath sounds bilaterally with scattered wheezes.   Abdominal: Non-tender, non-distended.  Extremities: trace LE edema  Neuro: A&O, moving all 4 limbs spontaneously.     Data   Data reviewed today: I reviewed all medications, new labs and imaging results over the last 24 hours.    Recent Labs   Lab 09/20/21  1903 09/16/21  0757 09/16/21  0749 09/15/21  0915   WBC 9.9  --   --  8.3   HGB 11.3*  --   --  11.0*   MCV 98  --   --  97     --   --  213   INR  --   --   --  0.96   *  --   --  136   POTASSIUM 5.3 4.3  --  4.8   CHLORIDE 101  --   --  98   CO2 23  --   --  26   BUN 90*  --   --  79*   CR 5.85*  --   --  5.63*   ANIONGAP 8  --   --  12   CHELSEY 8.5  --   --  8.6   GLC 98  --  61* 152*   ALBUMIN 3.0*  --   --  3.1*   PROTTOTAL 7.4  --   --  7.5   BILITOTAL 1.1  --   --  0.6   ALKPHOS 321*  --   --  323*   ALT 98*  --   --  84*   AST 22  --   --  30   TROPONIN 0.020  --   --   --      Recent Results (from the past 24 hour(s))   XR Chest 2 Views    Narrative    EXAM: XR CHEST 2 VW 9/20/2021    HISTORY: SOB.    COMPARISON: CT 9/15/2020 18 radiograph 6/11/2021.    TECHNIQUE: Upright frontal  and lateral views of the chest.    FINDINGS: Postsurgical changes of bilateral lung transplant with  intact median sternotomy wires. Unchanged surgical clips in the right  axilla. Patchy peripheral ill-defined interstitial opacities, left  greater than right. Dense left retrocardiac opacities. Probable small  right pleural effusion. Mild unchanged hypoattenuation in right lung  apex, possibly a mild amount of bullous change. No definite  pneumothorax. Accessory azygos fissure. Gas-distended loops of bowel  in the visualized abdomen. No acute osseous change. Suspected bone  island in the right humeral head.      Impression    IMPRESSION:   1. Bilateral lung transplant with increased patchy interstitial  opacities concerning for infection though edema could have a similar  appearance. Small right pleural effusion.  2. Abnormally gas distended bowel in the upper abdomen.    I have personally reviewed the examination and initial interpretation  and I agree with the findings.    NITHYA GUIDO MD         SYSTEM ID:  [id number]

## 2021-09-22 LAB
ANION GAP SERPL CALCULATED.3IONS-SCNC: 9 MMOL/L (ref 3–14)
BUN SERPL-MCNC: 51 MG/DL (ref 7–30)
C PNEUM DNA SPEC QL NAA+PROBE: NOT DETECTED
CALCIUM SERPL-MCNC: 8 MG/DL (ref 8.5–10.1)
CHLORIDE BLD-SCNC: 103 MMOL/L (ref 94–109)
CMV DNA SPEC NAA+PROBE-ACNC: NOT DETECTED IU/ML
CO2 SERPL-SCNC: 22 MMOL/L (ref 20–32)
CREAT SERPL-MCNC: 4.46 MG/DL (ref 0.52–1.04)
ERYTHROCYTE [DISTWIDTH] IN BLOOD BY AUTOMATED COUNT: 15.4 % (ref 10–15)
FLUAV H1 2009 PAND RNA SPEC QL NAA+PROBE: NOT DETECTED
FLUAV H1 RNA SPEC QL NAA+PROBE: NOT DETECTED
FLUAV H3 RNA SPEC QL NAA+PROBE: NOT DETECTED
FLUAV RNA SPEC QL NAA+PROBE: NOT DETECTED
FLUBV RNA SPEC QL NAA+PROBE: NOT DETECTED
GFR SERPL CREATININE-BSD FRML MDRD: 10 ML/MIN/1.73M2
GLUCOSE BLD-MCNC: 192 MG/DL (ref 70–99)
HADV DNA SPEC QL NAA+PROBE: NOT DETECTED
HBV SURFACE AB SERPL IA-ACNC: 0.5 M[IU]/ML
HBV SURFACE AG SERPL QL IA: NONREACTIVE
HCOV PNL SPEC NAA+PROBE: NOT DETECTED
HCT VFR BLD AUTO: 33.3 % (ref 35–47)
HGB BLD-MCNC: 10.7 G/DL (ref 11.7–15.7)
HMPV RNA SPEC QL NAA+PROBE: NOT DETECTED
HPIV1 RNA SPEC QL NAA+PROBE: NOT DETECTED
HPIV2 RNA SPEC QL NAA+PROBE: NOT DETECTED
HPIV3 RNA SPEC QL NAA+PROBE: NOT DETECTED
HPIV4 RNA SPEC QL NAA+PROBE: NOT DETECTED
LACTATE SERPL-SCNC: 0.7 MMOL/L (ref 0.7–2)
M PNEUMO DNA SPEC QL NAA+PROBE: NOT DETECTED
MCH RBC QN AUTO: 30.7 PG (ref 26.5–33)
MCHC RBC AUTO-ENTMCNC: 32.1 G/DL (ref 31.5–36.5)
MCV RBC AUTO: 96 FL (ref 78–100)
PLATELET # BLD AUTO: 200 10E3/UL (ref 150–450)
POTASSIUM BLD-SCNC: 3.5 MMOL/L (ref 3.4–5.3)
RBC # BLD AUTO: 3.48 10E6/UL (ref 3.8–5.2)
RSV RNA SPEC QL NAA+PROBE: NOT DETECTED
RSV RNA SPEC QL NAA+PROBE: NOT DETECTED
RV+EV RNA SPEC QL NAA+PROBE: NOT DETECTED
SODIUM SERPL-SCNC: 134 MMOL/L (ref 133–144)
TACROLIMUS BLD-MCNC: 13.3 UG/L (ref 5–15)
TME LAST DOSE: NORMAL H
TME LAST DOSE: NORMAL H
WBC # BLD AUTO: 12.2 10E3/UL (ref 4–11)

## 2021-09-22 PROCEDURE — 250N000013 HC RX MED GY IP 250 OP 250 PS 637

## 2021-09-22 PROCEDURE — 99207 PR CDG-MDM COMPONENT: MEETS MODERATE - DOWN CODED: CPT | Performed by: STUDENT IN AN ORGANIZED HEALTH CARE EDUCATION/TRAINING PROGRAM

## 2021-09-22 PROCEDURE — 94640 AIRWAY INHALATION TREATMENT: CPT | Mod: 76

## 2021-09-22 PROCEDURE — 999N000157 HC STATISTIC RCP TIME EA 10 MIN

## 2021-09-22 PROCEDURE — 250N000012 HC RX MED GY IP 250 OP 636 PS 637: Performed by: NURSE PRACTITIONER

## 2021-09-22 PROCEDURE — 250N000012 HC RX MED GY IP 250 OP 636 PS 637

## 2021-09-22 PROCEDURE — 90937 HEMODIALYSIS REPEATED EVAL: CPT

## 2021-09-22 PROCEDURE — 99233 SBSQ HOSP IP/OBS HIGH 50: CPT | Mod: GC | Performed by: INTERNAL MEDICINE

## 2021-09-22 PROCEDURE — 36415 COLL VENOUS BLD VENIPUNCTURE: CPT | Performed by: STUDENT IN AN ORGANIZED HEALTH CARE EDUCATION/TRAINING PROGRAM

## 2021-09-22 PROCEDURE — 94640 AIRWAY INHALATION TREATMENT: CPT

## 2021-09-22 PROCEDURE — 258N000003 HC RX IP 258 OP 636: Performed by: STUDENT IN AN ORGANIZED HEALTH CARE EDUCATION/TRAINING PROGRAM

## 2021-09-22 PROCEDURE — 99232 SBSQ HOSP IP/OBS MODERATE 35: CPT | Mod: GC | Performed by: STUDENT IN AN ORGANIZED HEALTH CARE EDUCATION/TRAINING PROGRAM

## 2021-09-22 PROCEDURE — 83605 ASSAY OF LACTIC ACID: CPT | Performed by: STUDENT IN AN ORGANIZED HEALTH CARE EDUCATION/TRAINING PROGRAM

## 2021-09-22 PROCEDURE — 250N000009 HC RX 250

## 2021-09-22 PROCEDURE — 250N000009 HC RX 250: Performed by: STUDENT IN AN ORGANIZED HEALTH CARE EDUCATION/TRAINING PROGRAM

## 2021-09-22 PROCEDURE — 250N000011 HC RX IP 250 OP 636: Performed by: STUDENT IN AN ORGANIZED HEALTH CARE EDUCATION/TRAINING PROGRAM

## 2021-09-22 PROCEDURE — 80197 ASSAY OF TACROLIMUS: CPT | Performed by: NURSE PRACTITIONER

## 2021-09-22 PROCEDURE — 85027 COMPLETE CBC AUTOMATED: CPT | Performed by: STUDENT IN AN ORGANIZED HEALTH CARE EDUCATION/TRAINING PROGRAM

## 2021-09-22 PROCEDURE — 250N000011 HC RX IP 250 OP 636

## 2021-09-22 PROCEDURE — 120N000011 HC R&B TRANSPLANT UMMC

## 2021-09-22 PROCEDURE — 80048 BASIC METABOLIC PNL TOTAL CA: CPT | Performed by: STUDENT IN AN ORGANIZED HEALTH CARE EDUCATION/TRAINING PROGRAM

## 2021-09-22 RX ORDER — LEVOFLOXACIN 750 MG/1
750 TABLET, FILM COATED ORAL ONCE
Status: COMPLETED | OUTPATIENT
Start: 2021-09-23 | End: 2021-09-23

## 2021-09-22 RX ORDER — ACETAMINOPHEN 325 MG/1
650 TABLET ORAL EVERY 6 HOURS PRN
Status: DISCONTINUED | OUTPATIENT
Start: 2021-09-22 | End: 2021-09-23 | Stop reason: HOSPADM

## 2021-09-22 RX ORDER — LEVOFLOXACIN 750 MG/1
750 TABLET, FILM COATED ORAL DAILY
Status: DISCONTINUED | OUTPATIENT
Start: 2021-09-23 | End: 2021-09-22

## 2021-09-22 RX ORDER — CALCIUM ACETATE 667 MG/1
667 CAPSULE ORAL
COMMUNITY
End: 2022-05-26

## 2021-09-22 RX ORDER — LEVOFLOXACIN 500 MG/1
500 TABLET, FILM COATED ORAL
Status: DISCONTINUED | OUTPATIENT
Start: 2021-09-25 | End: 2021-09-23 | Stop reason: HOSPADM

## 2021-09-22 RX ADMIN — METOPROLOL TARTRATE 25 MG: 25 TABLET, FILM COATED ORAL at 09:11

## 2021-09-22 RX ADMIN — PANTOPRAZOLE SODIUM 40 MG: 40 TABLET, DELAYED RELEASE ORAL at 09:11

## 2021-09-22 RX ADMIN — PREDNISONE 2.5 MG: 2.5 TABLET ORAL at 09:11

## 2021-09-22 RX ADMIN — ACETAMINOPHEN 650 MG: 325 TABLET, FILM COATED ORAL at 17:12

## 2021-09-22 RX ADMIN — Medication 25 MG: at 09:11

## 2021-09-22 RX ADMIN — ALBUTEROL SULFATE 2.5 MG: 2.5 SOLUTION RESPIRATORY (INHALATION) at 08:32

## 2021-09-22 RX ADMIN — TACROLIMUS 1 MG: 1 CAPSULE ORAL at 09:11

## 2021-09-22 RX ADMIN — SULFAMETHOXAZOLE AND TRIMETHOPRIM 1 TABLET: 400; 80 TABLET ORAL at 09:11

## 2021-09-22 RX ADMIN — BUDESONIDE 0.5 MG: 0.5 INHALANT ORAL at 08:32

## 2021-09-22 RX ADMIN — POSACONAZOLE 300 MG: 100 TABLET, COATED ORAL at 09:11

## 2021-09-22 RX ADMIN — PIPERACILLIN AND TAZOBACTAM 2.25 G: 2; .25 INJECTION, POWDER, FOR SOLUTION INTRAVENOUS at 19:51

## 2021-09-22 RX ADMIN — METOPROLOL TARTRATE 25 MG: 25 TABLET, FILM COATED ORAL at 19:50

## 2021-09-22 RX ADMIN — PREDNISONE 5 MG: 5 TABLET ORAL at 19:51

## 2021-09-22 RX ADMIN — ONDANSETRON 4 MG: 4 TABLET, ORALLY DISINTEGRATING ORAL at 15:40

## 2021-09-22 RX ADMIN — Medication: at 10:12

## 2021-09-22 RX ADMIN — SODIUM CHLORIDE 300 ML: 9 INJECTION, SOLUTION INTRAVENOUS at 10:11

## 2021-09-22 RX ADMIN — LIDOCAINE HYDROCHLORIDE 0.5 ML: 10 INJECTION, SOLUTION EPIDURAL; INFILTRATION; INTRACAUDAL; PERINEURAL at 10:12

## 2021-09-22 RX ADMIN — ALBUTEROL SULFATE 2.5 MG: 2.5 SOLUTION RESPIRATORY (INHALATION) at 20:18

## 2021-09-22 RX ADMIN — PIPERACILLIN AND TAZOBACTAM 2.25 G: 2; .25 INJECTION, POWDER, FOR SOLUTION INTRAVENOUS at 14:24

## 2021-09-22 RX ADMIN — SODIUM CHLORIDE 250 ML: 9 INJECTION, SOLUTION INTRAVENOUS at 10:12

## 2021-09-22 RX ADMIN — TACROLIMUS 0.5 MG: 0.5 CAPSULE ORAL at 18:01

## 2021-09-22 RX ADMIN — LIDOCAINE HYDROCHLORIDE 0.5 ML: 10 INJECTION, SOLUTION EPIDURAL; INFILTRATION; INTRACAUDAL; PERINEURAL at 10:13

## 2021-09-22 RX ADMIN — B-COMPLEX W/ C & FOLIC ACID TAB 1 MG 1 TABLET: 1 TAB at 14:25

## 2021-09-22 RX ADMIN — BUDESONIDE 0.5 MG: 0.5 INHALANT ORAL at 20:20

## 2021-09-22 RX ADMIN — PIPERACILLIN AND TAZOBACTAM 2.25 G: 2; .25 INJECTION, POWDER, FOR SOLUTION INTRAVENOUS at 03:24

## 2021-09-22 ASSESSMENT — ACTIVITIES OF DAILY LIVING (ADL)
ADLS_ACUITY_SCORE: 8
ADLS_ACUITY_SCORE: 10

## 2021-09-22 ASSESSMENT — MIFFLIN-ST. JEOR: SCORE: 1100.13

## 2021-09-22 NOTE — PHARMACY-ADMISSION MEDICATION HISTORY
Admission medication history interview status for the 9/20/2021 admission is complete. See EPIC admission navigator for allergy information, pharmacy, prior to admission medications and immunization status.     Medication history interview sources:  patient, [employer] pharmacy dispense history    Changes made to PTA medication list (reason)  Added:    Calcium acetate    Deleted:  - Imuran reports not taking in two years  - Ondansetron    Changed: none    Additional medication history information (including reliability of information, actions taken by pharmacist):    PTA tacrolimus dose:  0.5 mg AM, 1 mg PM    Medication history completed by:  Trino Hunt PharmD candidate 2022      Prior to Admission medications    Medication Sig Last Dose Taking? Auth Provider   acetaminophen (TYLENOL) 325 MG tablet Take 1 tablet (325 mg) by mouth every 4 hours as needed for mild pain or fever Past Week at Unknown time Yes Leelee Huang MD   albuterol (PROVENTIL) (2.5 MG/3ML) 0.083% neb solution Take 1 vial (2.5 mg) by nebulization 2 times daily Past Week at Unknown time Yes Tarik Christensen MD   budesonide (PULMICORT) 0.5 MG/2ML neb solution Take 2 mLs (0.5 mg) by nebulization 2 times daily Past Week at Unknown time Yes Ame Chow PA-C   calcium acetate (PHOSLO) 667 MG CAPS capsule Take 667 mg by mouth daily with food (largest meal) Past Week at Unknown time Yes Unknown, Entered By History   Magnesium Cl-Calcium Carbonate (SLOW-MAG) 71.5-119 MG TBEC Take 3 tablets by mouth four times a week Take on non dialysis days T/Th/Sa/Su Past Week at Unknown time Yes Yenni Graham MD   metoprolol tartrate (LOPRESSOR) 25 MG tablet 1 tablet (25 mg) by Oral or Feeding Tube route 2 times daily Past Week at Unknown time Yes Clarisse oRberson MD   multivitamin RENAL (RENAVITE RX/NEPHROVITE) 1 MG tablet Take 1 tablet by mouth daily Past Week at Unknown time Yes Ame Chow PA-C   pantoprazole (PROTONIX) 40 MG EC tablet  Take 1 tablet (40 mg) by mouth every morning (before breakfast) Past Week at Unknown time Yes Lissett Rodriguez PA-C   posaconazole (NOXAFIL) 100 MG EC tablet Take 3 tablets (300 mg) by mouth daily Past Week at Unknown time Yes Ame Chow PA-C   predniSONE (DELTASONE) 2.5 MG tablet Take 1 tablet (2.5 mg) by mouth every evening Past Week at Unknown time Yes Ame Chow PA-C   predniSONE (DELTASONE) 5 MG tablet Take 1 tablet (5 mg) by mouth every morning Past Week at Unknown time Yes Ame Chow PA-C   sulfamethoxazole-trimethoprim (BACTRIM) 400-80 MG tablet Take 1 tablet by mouth Every Mon, Wed, Fri Morning Past Week at Unknown time Yes Ame Chow PA-C   tacrolimus (GENERIC EQUIVALENT) 0.5 MG capsule Take 2 capsules (1mg) every AM and 1 capsule (0.5mg) every PM  Patient taking differently: Take 0.5 mg by mouth every morning  Past Week at Unknown time Yes Clarisse Roberson MD   tacrolimus (GENERIC EQUIVALENT) 1 MG capsule Patient needs a refill for dose adjustments: currently on 0.5 mg BID.  Patient taking differently: Take 1 mg by mouth every evening  Past Week at Unknown time Yes Ame Chow PA-C       I attest that the information provided in this note by the pharmacy student is complete and accurate    Ashish Box, PharmD  Inpatient Clinical Pharmacist

## 2021-09-22 NOTE — CONSULTS
Care Management Initial Consult    General Information  Assessment completed with: Patient  Type of CM/SW Visit: Initial Assessment  Primary Care Provider verified and updated as needed: Yes   Readmission within the last 30 days: current reason for admission unrelated to previous admission   Return Category: New Diagnosis   Reason for Consult: discharge planning  Advance Care Planning: ACP documents on file.     Communication Assessment  Patient's communication style: spoken language (English or Bilingual)    Hearing Difficulty or Deaf: no   Wear Glasses or Blind: yes    Cognitive  Cognitive/Neuro/Behavioral: WDL     Living Environment:   People in home: spouse, Ranjan  Current living Arrangements: house      Able to return to prior arrangements: yes    Family/Social Support:  Care provided by: self  Provides care for: no one  Marital Status:   Support System: , Ranjan       Description of Support System: Supportive, Involved       Current Resources:   Patient receiving home care services: No  Community Resources: Dialysis Services  Equipment currently used at home: none  Supplies currently used at home: None    Employment/Financial:  Financial Concerns: No concerns identified     Lifestyle & Psychosocial Needs:  Social Determinants of Health     Tobacco Use: Low Risk      Smoking Tobacco Use: Never Smoker     Smokeless Tobacco Use: Never Used   Alcohol Use:      Frequency of Alcohol Consumption:      Average Number of Drinks:      Frequency of Binge Drinking:    Financial Resource Strain:      Difficulty of Paying Living Expenses:    Food Insecurity:      Worried About Running Out of Food in the Last Year:      Ran Out of Food in the Last Year:    Transportation Needs:      Lack of Transportation (Medical):      Lack of Transportation (Non-Medical):    Physical Activity:      Days of Exercise per Week:      Minutes of Exercise per Session:    Stress:      Feeling of Stress :    Social Connections:       Frequency of Communication with Friends and Family:      Frequency of Social Gatherings with Friends and Family:      Attends Jewish Services:      Active Member of Clubs or Organizations:      Attends Club or Organization Meetings:      Marital Status:    Intimate Partner Violence:      Fear of Current or Ex-Partner:      Emotionally Abused:      Physically Abused:      Sexually Abused:    Depression: Not at risk     PHQ-2 Score: 0   Housing Stability:      Unable to Pay for Housing in the Last Year:      Number of Places Lived in the Last Year:      Unstable Housing in the Last Year:      Functional Status:  Prior to admission patient needed assistance: Independent.      Mental Health Status:  Mental Health Status: No Current Concerns       Chemical Dependency Status: No concerns noted.            Values/Beliefs:  Spiritual, Cultural Beliefs, Jewish Practices, Values that affect care: no             Additional Information:  Care management assessment completed via phone. Patient lives in [place], [place] w/her spouse, Ranjan. Patient is independent and does not utilize any adaptive equipment at baseline. Patient receives OP HD at [facility] ([facility]), drives self to appointments. Patient confirms that family will provide transportation on day of discharge. No discharge needs noted by patient. Patient is currently receiving IV abx, unclear if she will need at discharge. Care coordination team to continue to follow for discharge planning.     [facility] ([facility], 2pm chair time).   Phone: [phone]  Fax: [phone]    Mikayla Sandoval, RNCC, BSN    [facility]    [facility]icine [facility]  [address] [place] [id number]    [email or web address]    Office: [phone] Pager: [phone]  To contact the weekend RNCC, page [phone].

## 2021-09-22 NOTE — PLAN OF CARE
"BP (!) 146/109 (BP Location: Left arm)   Pulse 98   Temp 98.8  F (37.1  C) (Oral)   Resp 26   Ht 1.6 m (5' 3\")   Wt 55.1 kg (121 lb 7.6 oz)   LMP 06/07/2014 (Exact Date)   SpO2 94%   BMI 21.52 kg/m      Patient intermittently hypertensive (provider notified) and intermittently tachy on RA, afebrile. Complained of nausea once but pt declined zofran. Headache present, pateitn requesting tylenol provider notified. Tolerating dialysis diet with good appetite. PIV running TKO. Patient is up independently. Patient dialyzed today with 1L taken off. Will continue with POC and notify MD with changes or concerns.    "

## 2021-09-22 NOTE — PHARMACY-VANCOMYCIN DOSING SERVICE
"Pharmacy Vancomycin Note  Date of Service 2021  Patient's  1960   60 year old, female    Indication: Healthcare-Associated Pneumonia  Day of Therapy: 2  Current vancomycin regimen:  Intermittent post HD dosing  Current vancomycin monitoring method: Renal Replacement Therapy  Current vancomycin therapeutic monitoring goal: 15-20 mg/L    Current estimated CrCl = Estimated Creatinine Clearance: 8.9 mL/min (A) (based on SCr of 6.02 mg/dL (H)).    Creatinine for last 3 days  2021:  7:03 PM Creatinine 5.85 mg/dL  2021: 10:55 AM Creatinine 6.02 mg/dL    Recent Vancomycin Levels (past 3 days)  2021:  7:11 PM Vancomycin 18.8 mg/L    Vancomycin IV Administrations (past 72 hours)                   vancomycin 1250 mg in 0.9% NaCl 250 mL intermittent infusion 1,250 mg (mg) 1,250 mg New Bag 21 0400                Nephrotoxins and other renal medications (From now, onward)    Start     Dose/Rate Route Frequency Ordered Stop    21 1800  tacrolimus (GENERIC EQUIVALENT) capsule 0.5 mg      0.5 mg Oral EVERY EVENING 21 0247      21 0800  tacrolimus (GENERIC EQUIVALENT) capsule 1 mg      1 mg Oral EVERY MORNING. 21 0247      21 0400  piperacillin-tazobactam (ZOSYN) 2.25 g vial to attach to  ml bag     Note to Pharmacy: For [facility], [facility] and [facility]: For Zosyn-naive patients, use the \"Zosyn initial dose + extended infusion\" order panel.    2.25 g  over 30 Minutes Intravenous EVERY 6 HOURS 21 0247               Contrast Orders - past 72 hours (72h ago, onward)    None          Interpretation of levels and current regimen:  Vancomycin level is reflective of therapeutic level    Renal Function: ESRD on Dialysis    Plan:  1. Vancomycin discontinued       Ashish Box RPH    "

## 2021-09-22 NOTE — PROGRESS NOTES
HEMODIALYSIS TREATMENT NOTE    Date: 9/22/2021  Time: 1:26 PM    Data:  Pre Wt: 55.1 kg (121 lb 7.6 oz)   Desired Wt: 54.1 kg   Post Wt: 54.1 kg (119 lb 4.3 oz)  Weight change: 1 kg  Ultrafiltration - Post Run Net Total Removed (mL): 1000 mL  Vascular Access Status: Fistula    Dialyzer Rinse: Streaked  Total Blood Volume Processed: 60.46 L   Total Dialysis (Treatment) Time: 3   Dialysate Bath: K 2, Ca 3  Heparin: None    Lab:   No    Interventions:Assessment:  Pt dialyzed for 3 hrs on K 2, Ca 3 bath. Pt was vitally stable. A&Ox4. Calm and cooperative. Denied of SOB and pain. Tolerated 1 L fluid removal. AVF cannulated with 16g needle. Positive for thrill and bruit. Max 350 BFR achieved with good AP and . Hemostasis achieved within 10min. Hand off report given to LOUISE Gaona.     Plan:    Next HD per renal team

## 2021-09-22 NOTE — PROGRESS NOTES
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  Patient was admitted 21 with increased DESHPANDE, productive cough and hypoxia after recent tissue debulking during a bronchoscopy (21).  DDx pneumonia vs fluid overload in setting of missed HD session.     Today's recommendations:  - Sputum cultures (bacterial, fungal, AFB, nocardia) ordered, still needs collection, RT to induce in AM  - Recommend transition to levofloxacin PO to complete a total 10 day course through   - Repeat tacro level tomorrow  - Posaconazole level tomorrow  - Possible discharge tomorrow, pulmonary f/u in 2 weeks with repeat CXR and PFTs (we will arrange)     Possible HAP:  Pulmonary edema:  Acute hypoxic respiratory failure, Resolved:  S/p bilateral sequential lung transplant (BSLT) for ILD 2/2 CTD:    Stable pulmonary symptoms at recent OP visit on 9/15, PFTs improved from prior.  Last CMV () and DSA (9/15) negative.  CT chest (9/15) with increased RLL opacities (?worsening inflammation).  S/p interventional bronch on , then with progressive DESHPANDE, productive cough (pink frothy sputum), and new hypoxia (85%) at home since .  Hypoxia largely resolved at the time of admission.  CXR with increased diffuse patchy interstitial opacities (consistent with edema, less so infection).  Respiratory panel negative.  Etiology of  pulmonary symptoms likely pulmonary edema 2/2 hypervolemia in the setting missed HD sessions d/t bronch scheduling (NT pro BNP 97K), lower suspicion for infection (afebrile, procal mildly elevated at 0.21, mild leukocytosis).  - Sputum cultures (bacterial, fungal, AFB, nocardia) ordered, still needs collection, RT to induce in AM  - ABX: Zosyn and vancomycin, recommend transition to levofloxacin PO to complete a total 10 day course through 9/30  - Possible discharge tomorrow, pulmonary f/u in 2 weeks with repeat CXR and PFTs (we will arrange)     Immunosuppression:  - Tacrolimus 1 mg qAM/ 0.5 mg qPM (pt. missed 9/19 PM dose).  Goal level 8-10.  Repeat level tomorrow (ordered).  - AZA 25 mg daily (resumed 9/15, prior held since 2019 for leukopenia and then empyema)  - Prednisone 5 mg qAM / 2.5 mg qPM     Prophylaxis:   - Bactrim MWF for PJP      Right main bronchial stenosis s/p dilatation: Follow with Dr. Norris for serial bronchoscopies with dilation. Last bronch 9/16/21 with debulking.  - Airway clearance with IS, and nebs: albuterol and Pulmicort BID  - Repeat bronch with dilation in 3 months (~12/16)     Left-sided Aspergillus empyema:   Persistent right pleural effusion: Noted 10/8/19.  CT scan (7/17/20) with increased LLL mass-like density, likely pleural-based.  S/p needle aspiration (8/13/20) with Aspergillus fumigatus on cultures s/p intrapleural amphotericin bead placement (11/20, 5/21/21).  Most recent BDG fungitell (9/15) 210, decreased from prior.  CXR on admission small persistent right pleural effusion, stable.  - Posaconazole, repeat level ordered for tomorrow (QTc stable at 458, ALP with stable mild elevation since 2019, ALT recently mildly elevated)    EBV viremia: Most recent EBV stable at 38k/log 4.7 (9/15).   - Repeat EBV in 3 months (12/15)     ESRD on iHD (since 10/2019): Access is left AVF with partial graft.  On MWF iHD.  Renal transplant evaluation on hold for ongoing treatment of empyema.   "Missed dialysis run on 9/16 d/t bronch and 9/20 with admission, BNP markedly elevated.  - Management per nephrology     We appreciate the excellent care provided by the [place] [address] team.  Recommendations communicated via in person rounding and this note.  Will continue to follow along closely, please do not hesitate to call with any questions or concerns.     Patient discussed with Dr. Christensen.    Hina Huff, DNP, APRN, CNP  Inpatient Nurse Practitioner  Pulmonary CF/Transplant     Subjective & Interval History:     Continues without hypoxia.  Cough infrequent, generally unproductive.  Feeling well.  HD run yesterday for 2L, tolerated well.  Feeling a little jittery, prior experience with ABX regimen that resolved after course completed.    Review of Systems:     C: No fever, no chills, no change in appetite  INTEGUMENTARY/SKIN: No rash or obvious new lesions  ENT/MOUTH: No sore throat, no sinus pain, no nasal congestion or drainage  RESP: See interval history  CV: No chest pain, no palpitations, no peripheral edema, no orthopnea  GI: No nausea, no vomiting, + stable loose stools, no reflux symptoms  : No dysuria  MUSCULOSKELETAL: No myalgias, no arthralgias  ENDOCRINE: Blood sugars with adequate control  NEURO: No headache, no numbness or tingling  PSYCHIATRIC: Mood stable    Physical Exam:     Vital signs:  Temp: 98.3  F (36.8  C) Temp src: Oral BP: (!) 158/83 Pulse: 110   Resp: 14 SpO2: 94 % O2 Device: None (Room air) Oxygen Delivery: 2 LPM Height: 160 cm (5' 3\") Weight: 55.1 kg (121 lb 7.6 oz)  I/O:     Intake/Output Summary (Last 24 hours) at 9/22/2021 1056  Last data filed at 9/21/2021 1821  Gross per 24 hour   Intake 200 ml   Output 2000 ml   Net -1800 ml     Constitutional: Sitting up in bed,  at bedside, in no apparent distress.   HEENT: Eyes with pink conjunctivae, anicteric.  Oral mucosa moist without lesions.    PULM: Diminished RLL.  Crackles t/o, + inspiratory/expiratory rhonchi t/o " right, no wheezes.  Non-labored breathing on RA.  CV: Normal S1 and S2.  RRR.  No murmur, gallop, or rub.  No peripheral edema.   ABD: NABS, soft, nontender, nondistended.    MSK: Moves all extremities.  No apparent muscle wasting.   NEURO: Alert and oriented, conversant.   SKIN: Warm, dry.  No rash on limited exam.   PSYCH: Mood stable.     Lines, Drains, and Devices:  Peripheral IV 09/20/21 Anterior;Right (Active)   Site Assessment WDL 09/22/21 0300   Line Status Infusing 09/22/21 0300   Phlebitis Scale 0-->no symptoms 09/22/21 0300   Infiltration Scale 0 09/21/21 1900   Number of days: 2       Hemodialysis Vascular Access Arteriovenous graft Left Arm (Active)   Site Assessment WDL;Thrill present;Bruit present 09/22/21 1000   Cannulation Needle Size 16 09/22/21 1000   Dressing Intervention New dressing 09/21/21 1530   Dressing Status Not applicable 09/21/21 2100   Number of days: 240     Data:     LABS    CMP:   Recent Labs   Lab 09/21/21  1055 09/20/21  1903 09/16/21  0757 09/16/21  0749    132*  --   --    POTASSIUM 4.6 5.3 4.3  --    CHLORIDE 102 101  --   --    CO2 19* 23  --   --    ANIONGAP 15* 8  --   --    * 98  --  61*   BUN 92* 90*  --   --    CR 6.02* 5.85*  --   --    GFRESTIMATED 7* 7*  --   --    CHELSEY 8.2* 8.5  --   --    PROTTOTAL 6.7* 7.4  --   --    ALBUMIN 2.6* 3.0*  --   --    BILITOTAL 1.2 1.1  --   --    ALKPHOS 324* 321*  --   --    AST 22 22  --   --    ALT 80* 98*  --   --      CBC:   Recent Labs   Lab 09/21/21  1055 09/20/21  1903   WBC 11.5* 9.9   RBC 3.37* 3.67*   HGB 10.3* 11.3*   HCT 31.9* 36.1   MCV 95 98   MCH 30.6 30.8   MCHC 32.3 31.3*   RDW 15.2* 15.3*    228       INR: No lab results found in last 7 days.    Glucose:   Recent Labs   Lab 09/21/21  1055 09/20/21  1903 09/16/21  0749   * 98 61*       Blood Gas: No lab results found in last 7 days.    Culture Data No results for input(s): CULT in the last 168 hours.    Virology Data:   Lab Results    Component Value Date    FLUAH1 Not Detected 09/21/2021    FLUAH3 Not Detected 09/21/2021    LG2599 Not Detected 09/21/2021    IFLUB Not Detected 09/21/2021    RSVA Not Detected 09/21/2021    RSVB Not Detected 09/21/2021    PIV1 Not Detected 09/21/2021    PIV2 Not Detected 09/21/2021    PIV3 Not Detected 09/21/2021    HMPV Not Detected 09/21/2021    HRVS Negative 01/24/2021    ADVBE Negative 01/24/2021    ADVC Negative 01/24/2021    ADVC Negative 12/23/2020    ADVC Negative 10/07/2019       Historical CMV results (last 3 of prior testing):  Lab Results   Component Value Date    CMVQNT CMV DNA Not Detected 02/25/2021    CMVQNT <137 (A) 01/25/2021    CMVQNT 238 (A) 01/24/2021     Lab Results   Component Value Date    CMVLOG Not Calculated 02/25/2021    CMVLOG <2.1 01/25/2021    CMVLOG 2.4 (H) 01/24/2021       Urine Studies    Recent Labs   Lab Test 01/24/21  1729 10/21/19  2240   URINEPH 5.0 5.0   NITRITE Negative Negative   LEUKEST Moderate* Large*   WBCU 34* 115*       Most Recent [place] Pulmonary Function Testing (FVC/FEV1 only)  FVC-Pre   Date Value Ref Range Status   09/15/2021 1.52 L    06/11/2021 1.38 L    04/08/2021 1.41 L    12/09/2020 1.12 L      FVC-%Pred-Pre   Date Value Ref Range Status   09/15/2021 47 %    06/11/2021 42 %    04/08/2021 43 %    12/09/2020 34 %      FEV1-Pre   Date Value Ref Range Status   09/15/2021 1.29 L    06/11/2021 1.16 L    04/08/2021 1.09 L    12/09/2020 0.98 L      FEV1-%Pred-Pre   Date Value Ref Range Status   09/15/2021 50 %    06/11/2021 45 %    04/08/2021 42 %    12/09/2020 37 %        IMAGING    Recent Results (from the past 48 hour(s))   XR Chest 2 Views    Narrative    EXAM: XR CHEST 2 VW 9/20/2021    HISTORY: SOB.    COMPARISON: CT 9/15/2020 18 radiograph 6/11/2021.    TECHNIQUE: Upright frontal and lateral views of the chest.    FINDINGS: Postsurgical changes of bilateral lung transplant with  intact median sternotomy wires. Unchanged surgical clips in the right  axilla.  Patchy peripheral ill-defined interstitial opacities, left  greater than right. Dense left retrocardiac opacities. Probable small  right pleural effusion. Mild unchanged hypoattenuation in right lung  apex, possibly a mild amount of bullous change. No definite  pneumothorax. Accessory azygos fissure. Gas-distended loops of bowel  in the visualized abdomen. No acute osseous change. Suspected bone  island in the right humeral head.      Impression    IMPRESSION:   1. Bilateral lung transplant with increased patchy interstitial  opacities concerning for infection though edema could have a similar  appearance. Small right pleural effusion.  2. Abnormally gas distended bowel in the upper abdomen.    I have personally reviewed the examination and initial interpretation  and I agree with the findings.    NITHYA GUIDO MD         SYSTEM ID:  [id number]

## 2021-09-22 NOTE — PLAN OF CARE
[id number] Nursing Shift Report:  Kecia appeared to sleep soundly throughout the night. VSS, infrequent productive cough noted. Continues on Zosyn q6hrs. Was passed on that she is due to dialyze today. Will continue to monitor and report any significant changes.

## 2021-09-22 NOTE — PROVIDER NOTIFICATION
"Dr. Chin paged \"lab called to ask if we still need vanco level, I see vanco was given at 0400, would a level now still be helpful? Pt is also stating she already had a respiratory swab and sputum sample sent, is this at all possible? Thanks\"  "

## 2021-09-22 NOTE — PROGRESS NOTES
"  Nephrology Progress Note  09/22/2021         Assessment & Recommendations:   - HD today, will continue to remove fluid as tolerated    # ESRD, MWF dialysis  Secondary to CNI toxicity. Dialyzes with [facility], primary nephrologist Dr. Glasgow. EDW 53 kg.     # Volume  Pulling fluid with HD.     # HTN  Continue PTA antihypertensives.     Recommendations were communicated to primary team via note and verbally     Seen and discussed with Dr. Anjana Grossman MD     Interval History :   Nursing and provider notes from last 24 hours reviewed.    Tolerated HD without issue. Feels great today.    Review of Systems:   4 pt ROS performed and negative except as above.    Physical Exam:   I/O last 3 completed shifts:  In: 200 [IV Piggyback:200]  Out: 2000 [Other:2000]   BP (!) 143/80 (BP Location: Left arm)   Pulse 87   Temp 98.6  F (37  C) (Oral)   Resp 18   Ht 1.6 m (5' 3\")   Wt 56.5 kg (124 lb 9 oz)   LMP 06/07/2014 (Exact Date)   SpO2 94%   BMI 22.06 kg/m       GENERAL APPEARANCE: no acute distress,  awake  EYES: npo scleral icterus, pupils equal  Endo: no goiter, no moon facies  Lymphatics: no cervical or supraclavicular LAD  Pulmonary: lungs clear to auscultation with equal breath sounds bilaterally, no clubbing  CV: regular rhythm, normal rate, no rub   - JVD none   - Edema minimal  GI: soft, nontender, normal bowel sounds  MS: no evidence of inflammation in joints, no muscle tenderness  : no rivas  SKIN: no rash, warm, dry, no cyanosis  NEURO: face symmetric, no asterixis     Labs:   All labs reviewed by me  Electrolytes/Renal - Recent Labs   Lab Test 09/21/21  1055 09/20/21  1903 09/16/21  0757 09/16/21  0749 09/15/21  0915 09/15/21  0915 05/05/21  0707 05/03/21  0640 05/01/21  0654 04/30/21  0606 04/29/21  1833 04/08/21  0722 04/08/21  0722 03/29/21  0000 03/29/21  0000 03/04/21  0611 03/04/21  0611    132*  --   --   --  136   < > 132*   < > 136  --    < > 138   < > 141   < > 133 "   POTASSIUM 4.6 5.3 4.3  --    < > 4.8   < > 5.5*   < > 4.1  --    < > 3.7   < > 4.3   < > 4.5   CHLORIDE 102 101  --   --   --  98   < > 100   < > 101  --    < > 101   < > 101   < > 98   CO2 19* 23  --   --   --  26   < > 23   < > 27  --    < > 32   < > 25   < > 23   BUN 92* 90*  --   --   --  79*   < > 85*   < > 45*  --    < > 22   < > 49.5  11.00   < > 83*   CR 6.02* 5.85*  --   --   --  5.63*   < > 4.55*   < > 3.40*  --    < > 2.49*   < > 4.50   < > 3.98*   * 98  --  61*  --  152*   < > 129*   < > 130*  --    < > 138*   < > 122*   < > 127*   CHELSEY 8.2* 8.5  --   --   --  8.6   < > 7.8*   < > 8.6  --    < > 7.9*   < > 7.9   < > 7.9*   MAG  --   --   --   --   --   --   --   --   --  1.7 1.6  --  1.5*   < > 1.4   < > 1.6   PHOS  --   --   --   --   --   --   --  5.9*  --   --   --   --   --   --  5.8  --  6.1*    < > = values in this interval not displayed.       CBC -   Recent Labs   Lab Test 09/21/21  1055 09/20/21  1903 09/15/21  0915   WBC 11.5* 9.9 8.3   HGB 10.3* 11.3* 11.0*    228 213       LFTs -   Recent Labs   Lab Test 09/21/21  1055 09/20/21  1903 09/15/21  0915   ALKPHOS 324* 321* 323*   BILITOTAL 1.2 1.1 0.6   ALT 80* 98* 84*   AST 22 22 30   PROTTOTAL 6.7* 7.4 7.5   ALBUMIN 2.6* 3.0* 3.1*       Iron Panel -   Recent Labs   Lab Test 02/03/21  0415 12/13/18  1033 08/01/18  0921 08/01/18  0921   IRON 51 16*  --  93   IRONSAT 36 7*  --  37   YOLA  --  302*   < > 571*    < > = values in this interval not displayed.         Imaging:  Reviewed.    Current Medications:    sodium chloride 0.9%  250 mL Intravenous Once in dialysis/CRRT     sodium chloride 0.9%  300 mL Hemodialysis Machine Once     albuterol  2.5 mg Nebulization BID     azaTHIOprine  25 mg Oral Daily     budesonide  0.5 mg Nebulization BID     magnesium chloride  1,605 mg Oral Once per day on Sun Tue Thu Sat     metoprolol tartrate  25 mg Oral or Feeding Tube BID     multivitamin RENAL  1 tablet Oral Daily     - MEDICATION  INSTRUCTIONS -   Does not apply Once     pantoprazole  40 mg Oral Daily     piperacillin-tazobactam  2.25 g Intravenous Q6H     posaconazole  300 mg Oral Daily     predniSONE  2.5 mg Oral QAM     predniSONE  5 mg Oral QPM     sodium chloride (PF)  3 mL Intracatheter Q8H     sulfamethoxazole-trimethoprim  1 tablet Oral Q Mon Wed Fri AM     tacrolimus  0.5 mg Oral QPM     tacrolimus  1 mg Oral QAM     vancomycin place jordan - receiving intermittent dosing  1 each Does not apply See Admin Instructions       - MEDICATION INSTRUCTIONS -       Dom Grossman MD   I was present with the fellow during the history and exam.  I discussed the case with the fellow and agree with the findings as documented in the assessment and plan.  Chey Yeung

## 2021-09-22 NOTE — CONSULTS
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 Patient admitted 21 with increased DESHPANDE, productive cough and hypoxia c/f pneumonia after recent tissue debulking during a bronchoscopy 2021 vs fluid overload in setting of missed HD session.     Acute hypoxic respiratory failure:  S/p bilateral lung transplant for ILD 2/2 CTD: Last transplant clinic visit 9/15, felt well at that time with no pulmonary complaints, PFTs improved from prior. Last CMV , and DSA 9/15 negative. EBV 9/15 stable at 38K/log 4.7. CT chest (9/15) with Increased opacities in right lower lung felt to be worsening inflammation. S/p bronch with RM tissue debulking , as below. Presenting with 2 days of increased DESHPANDE, productive cough (pink frothy sputum), and hypoxia (85%) at home (not on home O2). Not hypoxic on admission. CXR with increased diffuse patchy interstitial opacities suspicious for edema rather than infection, small persistent right pleural effusion (personally reviewed). Etiology likely volume overload and pulmonary edema in the setting missed HD sessions (NT pro BNP 97K), lower suspicion for infection (afebrile and procal low at 0.21, though with mild leukocytosis).  - Respiratory panel and sputum cultures (bacterial, fungal, AFB, nocardia)  ordered  - Agree with broad coverage with Zosyn and vancomycin, consider discontinue after 48 hours, pending clinical improvement with volume removal and cultures  - CMV ordered  - Repeat EBV in 3 months    Immunosuppression:  - Tacrolimus 1 mg qAM/ 0.5 mg qPM. Goal level 8-10. Level ordered 9/22, note Pt missed dose 9/19 PM  - AZA 25 mg daily (resumed 9/15, prior held since 2019 for leukopenia and then empyema)  - Prednisone 5 mg qAM / 2.5 mg qPM     Prophylaxis:   - Bactrim MWF for PJP PPx    Right main bronchial stenosis s/p dilatation: Follow with Dr. Norris for serial bronchoscopies with dilation. Last bronch 9/16/21 with debulking with plan for repeat bronch with dilation in 3 months.  - Airway clearance with IS, and Nebs: Albuterol and Pulmicort BID    Left-sided aspergillus empyema: noted 10/8/19. CT scan on 7/17/20 with increased LLL mass-like density, likely pleural-based, s/p needle aspiration (8/13/20) with Aspergillus fumigatus on cultures s/p intrapleural amphotericin bead placement 11/20. Pleural effusion persisted, s/p chest tube drainage (cultures negative) in 5/21 and amphotericin beads were re-instilled given uptrending BDG fungitell and positive KOH. Most recent BDG fungitell (9/15) 210, decreased from prior.  - Continue posaconazole. Level 9/23 ordered. EKG with QTc 458 msec, LFTs with ALP elevated since 2019 and overall stable, and ALT mildly elevated since 9/15, stable.    ESRD on iHD (since 10/2019): access is AVF with partial graft. Continues now on iHD M/W/F. Transplant evaluation on hold for ongoing treatment of empyema. Missed dialysis run on 9/16 d/t bronch and 9/20 with admission.  - Management per nephrology, agree with HD today and additionally tomorrow for volume removal    [facility].  Recommendations communicated via phone and this note.  Will continue to follow along closely, please do not hesitate to call with any  questions or concerns.    Patient discussed with Dr. Fermin Ibarra, APRN, CNP   Inpatient Nurse Practitioner  Pulmonary CF/Transplant  Pager #[phone]     Chief Complaint:     increased DESHPANDE, productive cough and hypoxia    History of Present Illness:     History obtained from patient and chart review.    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 Patient admitted 9/20/21 with increased DESHPANDE, productive cough and hypoxia c/f pneumonia after recent tissue debulking during a bronchoscopy 9/16/2021 vs fluid overload in setting of missed HD session.   Seen in transplant clinic 9/15 prior to planned bronchoscopy, felt well at that time with no pulmonary complaints, PFTs improved from prior. Last CMV 9/1, and DSA 9/15 negative. EBV 9/15 stable at 38K/log 4.7. CT chest (9/15) with Increased opacities in right lower lung felt to be worsening inflammation. S/p bronch with RM tissue debulking 9/16. She did miss HD runs on 9/16 d/t bronch and again on 9/20 d/t admission. Reports 2 days of increased DESHPANDE, productive cough (pink frothy sputum). She had been checking her O2 saturations at home and SpO2 decreased to 85% on monday (not on home O2), so she presented to the ED. On arrival to ED, she was not hypoxic and has not needed supplemental O2. CXR with increased diffuse patchy interstitial opacities suspicious for edema rather than infection, small persistent right pleural effusion. Remains afebrile and procal low at 0.21, though with mild leukocytosis. Pro BNP elevated at 97K. No sinus pressure, rhinorrhea, HA, sore throat, or ear pain. Denies reflux, no  abdominal complaints, stools regular and normal. Denies recent weight gain or edema. Oliguric, no dysuria. No sick contacts.     Review of Systems:     10-point ROS negative except noted in HPI    Medical and Surgical History:     Past Medical History:   Diagnosis Date     Acute on chronic respiratory failure with hypoxia (H) 02/21/2018     Anisocoria      Antisynthetase syndrome (H) 2014     Anxiety      Aspergillus (H)      Aspergillus pneumonia (H) 11/20/2020     Benign essential hypertension      C. difficile colitis      Cytomegalovirus (CMV) viremia (H)      Dermatomyositis (H)      Dysplasia of cervix, low grade (ESTRADA 1)      EBV (Franklin-Barr virus) viremia      ESRD (end stage renal disease) on dialysis (H)      ILD (interstitial lung disease) (H)      Lung replaced by transplant (H)      Osteopenia      Paroxysmal atrial fibrillation (H)      Pneumocystis jiroveci pneumonia (H)      PONV (postoperative nausea and vomiting)      Post-menopause      Pulmonary hypertension (H)      Raynaud's disease      Seronegative rheumatoid arthritis (H)      Past Surgical History:   Procedure Laterality Date     BRONCHOSCOPY (RIGID OR FLEXIBLE), DIAGNOSTIC N/A 4/10/2018    Procedure: COMBINED BRONCHOSCOPY (RIGID OR FLEXIBLE), LAVAGE;;  Surgeon: Mariposa Donohue MD;  [facility]     BRONCHOSCOPY (RIGID OR FLEXIBLE), DIAGNOSTIC N/A 12/23/2020    Procedure: BRONCHOSCOPY, WITH BRONCHOALVEOLAR LAVAGE;  Surgeon: Uri Bass MD;  [facility]     BRONCHOSCOPY (RIGID OR FLEXIBLE), DILATE BRONCHUS / TRACHEA N/A 10/11/2018    Procedure: BRONCHOSCOPY (RIGID OR FLEXIBLE), DILATE BRONCHUS / TRACHEA;  Flexible And Rigid Bronchoscopy and Dilation;  Surgeon: Wilber Lin MD;  [facility]     BRONCHOSCOPY FLEXIBLE N/A 3/13/2018    Procedure: BRONCHOSCOPY FLEXIBLE;  Flexible Bronchoscopy ;  Surgeon: Gissell Sanchez MD;  [facility]     BRONCHOSCOPY FLEXIBLE N/A 5/9/2018    Procedure: BRONCHOSCOPY  FLEXIBLE;;  Surgeon: Wilber Lin MD;  [facility]     BRONCHOSCOPY FLEXIBLE AND RIGID N/A 9/10/2018    Procedure: BRONCHOSCOPY FLEXIBLE AND RIGID;  Flexible and Rigid Bronchoscopy with Balloon Dilation, tissue debulking with cryo, and Right mainstem bronchus stent placement;  Surgeon: Wilber Lin MD;  [facility]     BRONCHOSCOPY RIGID N/A 6/6/2018    Procedure: BRONCHOSCOPY RIGID;;  Surgeon: Lopez Macias MD;  [facility]     BRONCHOSCOPY, DILATE BRONCHUS, STENT BRONCHUS, COMBINED N/A 6/11/2018    Procedure: COMBINED BRONCHOSCOPY, DILATE BRONCHUS, STENT BRONCHUS;  Flexible Bronchoscopy, Balloon Dilation, Bronchial Washings;  Surgeon: Wilber Lin MD;  [facility]     BRONCHOSCOPY, DILATE BRONCHUS, STENT BRONCHUS, COMBINED Right 7/10/2018    Procedure: COMBINED BRONCHOSCOPY, DILATE BRONCHUS, STENT BRONCHUS;  Flexible Bronchoscopy, Balloon Dilation, Bronchial Washings  ;  Surgeon: Wilber Lin MD;  [facility]     BRONCHOSCOPY, DILATE BRONCHUS, STENT BRONCHUS, COMBINED N/A 8/2/2018    Procedure: COMBINED BRONCHOSCOPY, DILATE BRONCHUS, STENT BRONCHUS;  Flexible Bronchoscopy, Bronchial Washings, Balloon Dilation;  Surgeon: Wilber Lin MD;  [facility]     BRONCHOSCOPY, DILATE BRONCHUS, STENT BRONCHUS, COMBINED N/A 8/20/2018    Procedure: COMBINED BRONCHOSCOPY, DILATE BRONCHUS, STENT BRONCHUS;  Flexible Bronchoscopy, Balloon Dilation;  Surgeon: Wilber Lin MD;  [facility]     BRONCHOSCOPY, DILATE BRONCHUS, STENT BRONCHUS, COMBINED N/A 10/29/2018    Procedure: Flexible Bronchoscopy, Balloon Dilation, Stent Revision, Airway Examination And Therapeutic Suctioning, Cyro Tumor Debulking;  Surgeon: Wilber Lin MD;  [facility]     BRONCHOSCOPY, DILATE BRONCHUS, STENT BRONCHUS, COMBINED N/A 11/20/2018    Procedure: Rigid Bronchoscopy, Stent Removal and dilitation;  Surgeon: Wilber Lin MD;  [facility]     BRONCHOSCOPY, DILATE BRONCHUS, STENT BRONCHUS, COMBINED N/A 12/14/2018    Procedure: Flexible And Rigid Bronchoscopy, Balloon Dilation, Bronchial Washing;  Surgeon: Wilber Lin MD;  [facility]     BRONCHOSCOPY, DILATE BRONCHUS, STENT BRONCHUS, COMBINED N/A 1/17/2019    Procedure: Flexible And Rigid Bronchoscopy, Balloon Dilation.;  Surgeon: Wilber Lin MD;  [facility]     BRONCHOSCOPY, DILATE BRONCHUS, STENT BRONCHUS, COMBINED N/A 3/7/2019    Procedure: Flexible and Rigid Bronchoscopy, Bronchial Washing, Balloon Dilation;  Surgeon: Wilber Lin MD;  [facility]     BRONCHOSCOPY, DILATE BRONCHUS, STENT BRONCHUS, COMBINED N/A 6/6/2019    Procedure: Rigid and Flexible Bronchoscopy, Balloon Dilation;  Surgeon: Wilber Lin MD;  [facility]     BRONCHOSCOPY, DILATE BRONCHUS, STENT BRONCHUS, COMBINED N/A 10/11/2019    Procedure: Flexible and Rigid Bronchoscopy, Balloon Dilation, Bronchoalveolar Lagave;  Surgeon: Wilber Lin MD;  [facility]     BRONCHOSCOPY, DILATE BRONCHUS, STENT BRONCHUS, COMBINED N/A 2/19/2021    Procedure: BRONCHOSCOPY, flexible, airway dilation, and bronchial wash;  Surgeon: Wilber Lin MD;  [facility]     BRONCHOSCOPY, DILATE BRONCHUS, STENT BRONCHUS, COMBINED N/A 4/9/2021    Procedure: BRONCHOSCOPY, flexible and rigid, Airway suctioning;  Surgeon: Mati Norris MD;  [facility]     CV RIGHT HEART CATH MEASUREMENTS RECORDED N/A 3/10/2020    Procedure: CV RIGHT HEART CATH;  Surgeon: Wai Garcia MD;  [facility]     ENT SURGERY      tonsillectomy as a child     ESOPHAGOSCOPY, GASTROSCOPY, DUODENOSCOPY (EGD), COMBINED N/A 10/29/2018    Procedure: COMBINED ESOPHAGOSCOPY, GASTROSCOPY, DUODENOSCOPY (EGD) with biopsies and polypectomy;  Surgeon: Chente Bloom MD;  [facility]     INSERT EXTRACORPORAL MEMBRANE OXYGENATOR ADULT (OUTSIDE OR) N/A 2/27/2018     Procedure: INSERT EXTRACORPORAL MEMBRANE OXYGENATOR ADULT (OUTSIDE OR);  INSERT EXTRACORPORAL MEMBRANE OXYGENATOR ADULT (OUTSIDE OR) ;  Surgeon: Toby Hernandez MD;  [facility]     IR CVC TUNNEL PLACEMENT > 5 YRS OF AGE  10/25/2019     IR DIALYSIS FISTULOGRAM LEFT  3/2/2021     IR DIALYSIS MECH THROMB, PTA  3/2/2021     IR FLUORO 0-1 HOUR  5/7/2021     IR GASTRO JEJUNOSTOMY TUBE PLACEMENT  2/16/2021     IR PARACENTESIS  1/8/2020     IR THORACENTESIS  9/13/2019     IR TRANSCATHETER BIOPSY  1/8/2020     LASER CO2 BRONCHOSCOPY N/A 4/30/2021    Procedure: Flexible and Rigid Bronchoscopy and Tissue Debulking with CO2 Laser Assistance;  Surgeon: Mati Norris MD;  [facility]     LASER CO2 BRONCHOSCOPY N/A 6/11/2021    Procedure: BRONCHOSCOPY, Flexible and Rigid Bronchoscopy, Tissue Debulking with cryo Assistance, airway dilation,;  Surgeon: Mati Norris MD;  [facility]     LASER CO2 BRONCHOSCOPY N/A 9/16/2021    Procedure: BRONCHOSCOPY, flexible and rigid, CO2 Laser Debulking;  Surgeon: Mati Norris MD;  [facility]     no prior surgery       REMOVE EXTRACORPORAL MEMBRANE OXYGENATOR ADULT N/A 3/3/2018    Procedure: REMOVE EXTRACORPORAL MEMBRANE OXYGENATOR ADULT;  Removal of Right Femoral Venous and Right Axillary Arterial Extracorporeal Membrane Oxygenator;  Surgeon: Toby Hernandez MD;  [facility]     TRANSPLANT LUNG RECIPIENT SINGLE X2 Bilateral 3/1/2018    Procedure: TRANSPLANT LUNG RECIPIENT SINGLE X2;  Median Sternotomy, Extracorporeal Membrane Oxygenator, bilateral sequential lung transplant;  Surgeon: Toby Hernandez MD;  [facility]     Social and Family History:     Social History     Socioeconomic History     Marital status:      Spouse name: Not on file     Number of children: Not on file     Years of education: Not on file     Highest education level: Not on file   Occupational History     Not on file   Tobacco Use     Smoking  status: Never Smoker     Smokeless tobacco: Never Used   Substance and Sexual Activity     Alcohol use: No     Alcohol/week: 1.0 standard drinks     Types: 1 Glasses of wine per week     Drug use: No     Sexual activity: Not on file   Other Topics Concern     Parent/sibling w/ CABG, MI or angioplasty before 65F 55M? No   Social History Narrative    3/6/2019 - Lives with . Has three daughters. Four grandchildren (two ). No pets. Travelled previously to [facility]. Has visited [place] several times.      Social Determinants of Health     Financial Resource Strain:      Difficulty of Paying Living Expenses:    Food Insecurity:      Worried About Running Out of Food in the Last Year:      Ran Out of Food in the Last Year:    Transportation Needs:      Lack of Transportation (Medical):      Lack of Transportation (Non-Medical):    Physical Activity:      Days of Exercise per Week:      Minutes of Exercise per Session:    Stress:      Feeling of Stress :    Social Connections:      Frequency of Communication with Friends and Family:      Frequency of Social Gatherings with Friends and Family:      Attends [employer] Services:      Active Member of Clubs or Organizations:      Attends Club or Organization Meetings:      Marital Status:    Intimate Partner Violence:      Fear of Current or Ex-Partner:      Emotionally Abused:      Physically Abused:      Sexually Abused:      Family History   Problem Relation Age of Onset     Hypertension Mother      Arthritis Mother      Pancreatic Cancer Father      Diabetes Father      Allergies and Home Medications:   No Known Allergies  Medications Prior to Admission   Medication Sig Dispense Refill Last Dose     acetaminophen (TYLENOL) 325 MG tablet Take 1 tablet (325 mg) by mouth every 4 hours as needed for mild pain or fever 60 tablet       albuterol (PROVENTIL) (2.5 MG/3ML) 0.083% neb solution Take 1 vial (2.5 mg) by nebulization 2 times daily 360 mL 4       azaTHIOprine (IMURAN) 50 MG tablet Take 0.5 tablets (25 mg) by mouth daily 25 tablet       budesonide (PULMICORT) 0.5 MG/2ML neb solution Take 2 mLs (0.5 mg) by nebulization 2 times daily 60 mL 11      Magnesium Cl-Calcium Carbonate (SLOW-MAG) 71.5-119 MG TBEC Take 3 tablets by mouth four times a week Take on non dialysis days T/Th/Sa/Su 90 tablet 3      metoprolol tartrate (LOPRESSOR) 25 MG tablet 1 tablet (25 mg) by Oral or Feeding Tube route 2 times daily 60 tablet 11      multivitamin RENAL (RENAVITE RX/NEPHROVITE) 1 MG tablet Take 1 tablet by mouth daily 30 tablet 11      ondansetron (ZOFRAN-ODT) 4 MG ODT tab Take 1 tablet (4 mg) by mouth every 6 hours as needed for nausea or vomiting 12 tablet 0      pantoprazole (PROTONIX) 40 MG EC tablet Take 1 tablet (40 mg) by mouth every morning (before breakfast) 30 tablet 0      posaconazole (NOXAFIL) 100 MG EC tablet Take 3 tablets (300 mg) by mouth daily 270 tablet 0      predniSONE (DELTASONE) 2.5 MG tablet Take 1 tablet (2.5 mg) by mouth every evening 30 tablet 11      predniSONE (DELTASONE) 5 MG tablet Take 1 tablet (5 mg) by mouth every morning 30 tablet 11      sulfamethoxazole-trimethoprim (BACTRIM) 400-80 MG tablet Take 1 tablet by mouth Every Mon, Wed, Fri Morning 13 tablet 11      tacrolimus (GENERIC EQUIVALENT) 0.5 MG capsule Take 2 capsules (1mg) every AM and 1 capsule (0.5mg) every PM 90 capsule 11      tacrolimus (GENERIC EQUIVALENT) 1 MG capsule Patient needs a refill for dose adjustments: currently on 0.5 mg BID. 30 capsule 4      Current Scheduled Meds    albuterol  2.5 mg Nebulization BID     azaTHIOprine  25 mg Oral Daily     budesonide  0.5 mg Nebulization BID     magnesium chloride  1,605 mg Oral Once per day on Sun Tue Thu Sat     metoprolol tartrate  25 mg Oral or Feeding Tube BID     multivitamin RENAL  1 tablet Oral Daily     pantoprazole  40 mg Oral Daily     piperacillin-tazobactam  2.25 g Intravenous Q6H     posaconazole  300 mg Oral Daily  "    predniSONE  2.5 mg Oral QAM     predniSONE  5 mg Oral QPM     sodium chloride (PF)  3 mL Intracatheter Q8H     [START ON 9/22/2021] sulfamethoxazole-trimethoprim  1 tablet Oral Q Mon Wed Fri AM     tacrolimus  0.5 mg Oral QPM     tacrolimus  1 mg Oral QAM     vancomycin place jordan - receiving intermittent dosing  1 each Does not apply See Admin Instructions      Current PRN Meds  lidocaine 4%, lidocaine (buffered or not buffered), melatonin, ondansetron, polyethylene glycol, senna-docusate **OR** senna-docusate, sodium chloride (PF)     Physical Exam:     Vital signs:  Temp: 98  F (36.7  C) Temp src: Oral BP: 130/83 Pulse: 83   Resp: 18 SpO2: 96 % O2 Device: None (Room air) Oxygen Delivery: 2 LPM Height: 160 cm (5' 3\") Weight: 56.5 kg (124 lb 9 oz)  I/O:     Intake/Output Summary (Last 24 hours) at 9/21/2021 1910  Last data filed at 9/21/2021 1821  Gross per 24 hour   Intake 300 ml   Output 2000 ml   Net -1700 ml     Constitutional: Sitting on ED cot, in no apparent distress.   HEENT: Eyes with pink conjunctivae, anicteric.  Oral mucosa moist without lesions.  Neck supple without lymphadenopathy.   PULM: Good air flow bilaterally.  Diffuse fine crackles, no rhonchi, right basilar wheezes.  Non-labored breathing on RA.  CV: Normal S1 and S2.  RRR.  No murmur, gallop, or rub.  No peripheral edema.   ABD: NABS, soft, nontender, nondistended.    MSK: Moves all extremities.  No apparent muscle wasting.   NEURO: Alert and oriented, conversant.   SKIN: Warm, dry.  No rash on limited exam.   PSYCH: Mood stable.      Lines, Drains, and Devices:  Peripheral IV 09/20/21 Anterior;Right (Active)   Number of days: 1       Hemodialysis Vascular Access Arteriovenous graft Left Arm (Active)   Site Assessment WDL except;Ecchymotic 09/21/21 1325   Cannulation Needle Size 15 09/21/21 1325   Dressing Intervention New dressing 09/21/21 1530   Dressing Status Clean, dry, intact 09/21/21 1530   Number of days: 239     Results: "     LABS    CMP:   Recent Labs   Lab 09/21/21  1055 09/20/21  1903 09/16/21  0757 09/16/21  0749 09/15/21  0915    132*  --   --  136   POTASSIUM 4.6 5.3 4.3  --  4.8   CHLORIDE 102 101  --   --  98   CO2 19* 23  --   --  26   ANIONGAP 15* 8  --   --  12   * 98  --  61* 152*   BUN 92* 90*  --   --  79*   CR 6.02* 5.85*  --   --  5.63*   GFRESTIMATED 7* 7*  --   --  8*   CHELSEY 8.2* 8.5  --   --  8.6   PROTTOTAL 6.7* 7.4  --   --  7.5   ALBUMIN 2.6* 3.0*  --   --  3.1*   BILITOTAL 1.2 1.1  --   --  0.6   ALKPHOS 324* 321*  --   --  323*   AST 22 22  --   --  30   ALT 80* 98*  --   --  84*     CBC:   Recent Labs   Lab 09/21/21  1055 09/20/21  1903 09/15/21  0915   WBC 11.5* 9.9 8.3   RBC 3.37* 3.67* 3.63*   HGB 10.3* 11.3* 11.0*   HCT 31.9* 36.1 35.1   MCV 95 98 97   MCH 30.6 30.8 30.3   MCHC 32.3 31.3* 31.3*   RDW 15.2* 15.3* 15.1*    228 213       INR:   Recent Labs   Lab 09/15/21  0915   INR 0.96       Glucose:   Recent Labs   Lab 09/21/21  1055 09/20/21  1903 09/16/21  0749 09/15/21  0915   * 98 61* 152*       Blood Gas: No lab results found in last 7 days.    Culture Data No results for input(s): CULT in the last 168 hours.    Virology Data:   Lab Results   Component Value Date    FLUAH1 Not Detected 01/24/2021    FLUAH3 Not Detected 01/24/2021    NR0541 Not Detected 01/24/2021    IFLUB Not Detected 01/24/2021    RSVA Not Detected 01/24/2021    RSVB Not Detected 01/24/2021    PIV1 Not Detected 01/24/2021    PIV2 Not Detected 01/24/2021    PIV3 Not Detected 01/24/2021    HMPV Not Detected 01/24/2021    HRVS Negative 01/24/2021    ADVBE Negative 01/24/2021    ADVC Negative 01/24/2021    ADVC Negative 12/23/2020    ADVC Negative 10/07/2019       Historical CMV results (last 3 of prior testing):  Lab Results   Component Value Date    CMVQNT CMV DNA Not Detected 02/25/2021    CMVQNT <137 (A) 01/25/2021    CMVQNT 238 (A) 01/24/2021     Lab Results   Component Value Date    CMVLOG Not Calculated  02/25/2021    CMVLOG <2.1 01/25/2021    CMVLOG 2.4 (H) 01/24/2021       Urine Studies    Recent Labs   Lab Test 01/24/21  1729 10/21/19  2240   URINEPH 5.0 5.0   NITRITE Negative Negative   LEUKEST Moderate* Large*   WBCU 34* 115*       Most Recent Breeze Pulmonary Function Testing (FVC/FEV1 only)  FVC-Pre   Date Value Ref Range Status   09/15/2021 1.52 L    06/11/2021 1.38 L    04/08/2021 1.41 L    12/09/2020 1.12 L      FVC-%Pred-Pre   Date Value Ref Range Status   09/15/2021 47 %    06/11/2021 42 %    04/08/2021 43 %    12/09/2020 34 %      FEV1-Pre   Date Value Ref Range Status   09/15/2021 1.29 L    06/11/2021 1.16 L    04/08/2021 1.09 L    12/09/2020 0.98 L      FEV1-%Pred-Pre   Date Value Ref Range Status   09/15/2021 50 %    06/11/2021 45 %    04/08/2021 42 %    12/09/2020 37 %        IMAGING    Recent Results (from the past 48 hour(s))   XR Chest 2 Views    Narrative    EXAM: XR CHEST 2 VW 9/20/2021    HISTORY: SOB.    COMPARISON: CT 9/15/2020 18 radiograph 6/11/2021.    TECHNIQUE: Upright frontal and lateral views of the chest.    FINDINGS: Postsurgical changes of bilateral lung transplant with  intact median sternotomy wires. Unchanged surgical clips in the right  axilla. Patchy peripheral ill-defined interstitial opacities, left  greater than right. Dense left retrocardiac opacities. Probable small  right pleural effusion. Mild unchanged hypoattenuation in right lung  apex, possibly a mild amount of bullous change. No definite  pneumothorax. Accessory azygos fissure. Gas-distended loops of bowel  in the visualized abdomen. No acute osseous change. Suspected bone  island in the right humeral head.      Impression    IMPRESSION:   1. Bilateral lung transplant with increased patchy interstitial  opacities concerning for infection though edema could have a similar  appearance. Small right pleural effusion.  2. Abnormally gas distended bowel in the upper abdomen.    I have personally reviewed the examination  and initial interpretation  and I agree with the findings.    NITHYA GUIDO MD         SYSTEM ID:  [id number]

## 2021-09-22 NOTE — PROGRESS NOTES
2 RN skin check performed by Minh MISHRA RN and Yudelka. Bruising over L fistula, otherwise in tact. No skin issues found, no interventions needed.

## 2021-09-22 NOTE — PROGRESS NOTES
[facility]    Progress Note - Shavon 3 Service   Date of Admission:  9/20/2021    Changes today:   - Change to levofloxacin PO tomorrow  - Discharge in AM    Assessment and plan:  Kecia Blue is a 60 year old woman admitted on 9/20/2021. 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      #C/f HAP vs fluid overload in setting of missed HD session  Productive cough w/ clear/intermittently pink sputum without septic signs and a BNP>97,000 in the setting of missing a day of dialysis most concerning for fluid overload. Pulse ox of 85% at home, 95% in the ED. Improved significantly after dialysis, on room air.   - Transplant pulmonology and nephrology are following  - finish zosyn today  - start levofloxacin per transplant pulm recs for total 10d course     ESRD on HD (MWF)  Been on iHD since 2019 for suspected CNI toxicity.  - Nephrology following, dialysis today  - PTA renal vitamins     S/p bilateral lung transplant for ILD  Left-sided apergillus empyema (2019)  - Transplant Pulmonology consulted, appreciate recs  - PTA immunosuppressives:              - Azathioprine, tacrolimus, prednisone  - PTA posaconazole, Bactrim  - PTA albuterol, budesonide     Chronic Medical Conditions  HTN: continue PTA metoprolol  Dermatomyositis: Last Rheum visit seen 5/10/2018, on prednisone (as above)  LUE Line Associated DVT: not on anticoagulation     Diet: Dialysis Diet    DVT Prophylaxis: Pneumatic Compression Devices  Basilio Catheter: Not present    Disposition Plan   Expected discharge: 09/23/2021   recommended to prior living arrangement once tomorrow.     The patient's  care was discussed with the Attending Physician, Dr. Whipple.    Nakia Fair MD   PGY-2, Internal Medicine  [address]    ______________________________________________________________________    Interval History   NAEO. Pt doing well today, eating and ambulating. Getting nebs with RT. She feels much better after dialysis.     Data reviewed today: I reviewed all medications, new labs and imaging results over the last 24 hours. I personally reviewed no images or EKG's today.    Physical Exam   Vital Signs: Temp: 98.8  F (37.1  C) Temp src: Oral BP: (!) 146/109 Pulse: 98   Resp: 26 SpO2: 94 % O2 Device: None (Room air)    Weight: 121 lbs 7.58 oz  General Appearance: Well appearing sitting in bed doing breathing treatments  Respiratory: low pitched rumbling breath sounds bilaterally, normal WOB on RA  Cardiovascular: regular rate  GI: soft, NT, ND  Other: Alert, conversant, no DENIS

## 2021-09-22 NOTE — PLAN OF CARE
"Vitals: /88 (BP Location: Right arm)   Pulse 101   Temp 97.9  F (36.6  C) (Oral)   Resp 18   Ht 1.6 m (5' 3\")   Wt 56.5 kg (124 lb 9 oz)   LMP 06/07/2014 (Exact Date)   SpO2 96%   BMI 22.06 kg/m      Pt arrived on shift at around 1900, assumed cares until 2300  VS: apical pulse irregular, occassionally tachycardic otherwise stable on RA.   Neuro: A&Ox4  Labs: resp panel sent, sputum to be sent  Respiratory: coarse, wheezing. Frequent congested cough, denies SOB  Cardiac: occassionally tachycardic   Pain/Nausea/PRN: denies pain, denies nausea   Diet: dialysis diet   LDA: R PIV LAVF  Gtt/IVF: zosyn q6hrs   GI/: voiding not saving stooling   Skin: in tact   Mobility: up ad diego   Plan: dialysis 9/22    Will continue with plan of care and update team with any changes.     "

## 2021-09-23 ENCOUNTER — TELEPHONE (OUTPATIENT)
Dept: TRANSPLANT | Facility: CLINIC | Age: 61
End: 2021-09-23

## 2021-09-23 VITALS
DIASTOLIC BLOOD PRESSURE: 68 MMHG | SYSTOLIC BLOOD PRESSURE: 126 MMHG | TEMPERATURE: 97.7 F | HEART RATE: 88 BPM | OXYGEN SATURATION: 94 % | BODY MASS INDEX: 21.48 KG/M2 | WEIGHT: 121.2 LBS | RESPIRATION RATE: 18 BRPM | HEIGHT: 63 IN

## 2021-09-23 DIAGNOSIS — Z94.2 LUNG REPLACED BY TRANSPLANT (H): ICD-10-CM

## 2021-09-23 LAB
ANION GAP SERPL CALCULATED.3IONS-SCNC: 9 MMOL/L (ref 3–14)
BACTERIA SPT CULT: NORMAL
BUN SERPL-MCNC: 28 MG/DL (ref 7–30)
CALCIUM SERPL-MCNC: 8.2 MG/DL (ref 8.5–10.1)
CHLORIDE BLD-SCNC: 99 MMOL/L (ref 94–109)
CO2 SERPL-SCNC: 26 MMOL/L (ref 20–32)
CREAT SERPL-MCNC: 3.73 MG/DL (ref 0.52–1.04)
ERYTHROCYTE [DISTWIDTH] IN BLOOD BY AUTOMATED COUNT: 15.2 % (ref 10–15)
GFR SERPL CREATININE-BSD FRML MDRD: 13 ML/MIN/1.73M2
GLUCOSE BLD-MCNC: 146 MG/DL (ref 70–99)
GRAM STAIN RESULT: NORMAL
HCT VFR BLD AUTO: 33 % (ref 35–47)
HGB BLD-MCNC: 10.4 G/DL (ref 11.7–15.7)
LACTATE SERPL-SCNC: 1.5 MMOL/L (ref 0.7–2)
MCH RBC QN AUTO: 30.8 PG (ref 26.5–33)
MCHC RBC AUTO-ENTMCNC: 31.5 G/DL (ref 31.5–36.5)
MCV RBC AUTO: 98 FL (ref 78–100)
PLATELET # BLD AUTO: 165 10E3/UL (ref 150–450)
POSACONAZOLE SERPL-MCNC: 1.7 UG/ML (ref 0.7–5)
POTASSIUM BLD-SCNC: 4 MMOL/L (ref 3.4–5.3)
RBC # BLD AUTO: 3.38 10E6/UL (ref 3.8–5.2)
SODIUM SERPL-SCNC: 134 MMOL/L (ref 133–144)
TACROLIMUS BLD-MCNC: 15.1 UG/L (ref 5–15)
TME LAST DOSE: ABNORMAL H
TME LAST DOSE: ABNORMAL H
WBC # BLD AUTO: 13.9 10E3/UL (ref 4–11)

## 2021-09-23 PROCEDURE — 250N000009 HC RX 250

## 2021-09-23 PROCEDURE — 83605 ASSAY OF LACTIC ACID: CPT | Performed by: STUDENT IN AN ORGANIZED HEALTH CARE EDUCATION/TRAINING PROGRAM

## 2021-09-23 PROCEDURE — 94640 AIRWAY INHALATION TREATMENT: CPT

## 2021-09-23 PROCEDURE — 250N000012 HC RX MED GY IP 250 OP 636 PS 637

## 2021-09-23 PROCEDURE — 80197 ASSAY OF TACROLIMUS: CPT | Performed by: NURSE PRACTITIONER

## 2021-09-23 PROCEDURE — 36415 COLL VENOUS BLD VENIPUNCTURE: CPT | Performed by: STUDENT IN AN ORGANIZED HEALTH CARE EDUCATION/TRAINING PROGRAM

## 2021-09-23 PROCEDURE — 87102 FUNGUS ISOLATION CULTURE: CPT | Performed by: NURSE PRACTITIONER

## 2021-09-23 PROCEDURE — 250N000011 HC RX IP 250 OP 636: Performed by: STUDENT IN AN ORGANIZED HEALTH CARE EDUCATION/TRAINING PROGRAM

## 2021-09-23 PROCEDURE — 99233 SBSQ HOSP IP/OBS HIGH 50: CPT | Performed by: NURSE PRACTITIONER

## 2021-09-23 PROCEDURE — 87206 SMEAR FLUORESCENT/ACID STAI: CPT | Performed by: NURSE PRACTITIONER

## 2021-09-23 PROCEDURE — 80187 DRUG ASSAY POSACONAZOLE: CPT | Performed by: NURSE PRACTITIONER

## 2021-09-23 PROCEDURE — 99207 PR CDG-CODE INCORRECT PER BILLING BASED ON TIME: CPT | Performed by: STUDENT IN AN ORGANIZED HEALTH CARE EDUCATION/TRAINING PROGRAM

## 2021-09-23 PROCEDURE — 87070 CULTURE OTHR SPECIMN AEROBIC: CPT | Performed by: NURSE PRACTITIONER

## 2021-09-23 PROCEDURE — 250N000013 HC RX MED GY IP 250 OP 250 PS 637

## 2021-09-23 PROCEDURE — 85027 COMPLETE CBC AUTOMATED: CPT | Performed by: STUDENT IN AN ORGANIZED HEALTH CARE EDUCATION/TRAINING PROGRAM

## 2021-09-23 PROCEDURE — 250N000013 HC RX MED GY IP 250 OP 250 PS 637: Performed by: STUDENT IN AN ORGANIZED HEALTH CARE EDUCATION/TRAINING PROGRAM

## 2021-09-23 PROCEDURE — 80048 BASIC METABOLIC PNL TOTAL CA: CPT | Performed by: STUDENT IN AN ORGANIZED HEALTH CARE EDUCATION/TRAINING PROGRAM

## 2021-09-23 PROCEDURE — 999N000157 HC STATISTIC RCP TIME EA 10 MIN

## 2021-09-23 PROCEDURE — 99238 HOSP IP/OBS DSCHRG MGMT 30/<: CPT | Mod: GC | Performed by: STUDENT IN AN ORGANIZED HEALTH CARE EDUCATION/TRAINING PROGRAM

## 2021-09-23 PROCEDURE — 87116 MYCOBACTERIA CULTURE: CPT | Performed by: NURSE PRACTITIONER

## 2021-09-23 RX ORDER — TACROLIMUS 1 MG/1
CAPSULE ORAL
Qty: 30 CAPSULE | Refills: 4 | COMMUNITY
Start: 2021-09-23 | End: 2021-11-12

## 2021-09-23 RX ORDER — AZATHIOPRINE 50 MG/1
25 TABLET ORAL DAILY
Qty: 30 TABLET | Refills: 0 | Status: SHIPPED | OUTPATIENT
Start: 2021-09-23 | End: 2022-03-03

## 2021-09-23 RX ORDER — LEVOFLOXACIN 750 MG/1
750 TABLET, FILM COATED ORAL ONCE
Qty: 1 TABLET | Refills: 0 | Status: SHIPPED | OUTPATIENT
Start: 2021-09-23 | End: 2021-09-23

## 2021-09-23 RX ORDER — TACROLIMUS 0.5 MG/1
CAPSULE ORAL
Qty: 90 CAPSULE | Refills: 11 | COMMUNITY
Start: 2021-09-23 | End: 2021-11-12

## 2021-09-23 RX ORDER — LEVOFLOXACIN 500 MG/1
500 TABLET, FILM COATED ORAL
Qty: 3 TABLET | Refills: 0 | Status: SHIPPED | OUTPATIENT
Start: 2021-09-25 | End: 2021-10-12

## 2021-09-23 RX ADMIN — PIPERACILLIN AND TAZOBACTAM 2.25 G: 2; .25 INJECTION, POWDER, FOR SOLUTION INTRAVENOUS at 02:30

## 2021-09-23 RX ADMIN — PREDNISONE 2.5 MG: 2.5 TABLET ORAL at 09:07

## 2021-09-23 RX ADMIN — Medication 25 MG: at 09:07

## 2021-09-23 RX ADMIN — METOPROLOL TARTRATE 25 MG: 25 TABLET, FILM COATED ORAL at 09:08

## 2021-09-23 RX ADMIN — POSACONAZOLE 300 MG: 100 TABLET, COATED ORAL at 09:07

## 2021-09-23 RX ADMIN — B-COMPLEX W/ C & FOLIC ACID TAB 1 MG 1 TABLET: 1 TAB at 09:07

## 2021-09-23 RX ADMIN — BUDESONIDE 0.5 MG: 0.5 INHALANT ORAL at 07:47

## 2021-09-23 RX ADMIN — PANTOPRAZOLE SODIUM 40 MG: 40 TABLET, DELAYED RELEASE ORAL at 09:07

## 2021-09-23 RX ADMIN — TACROLIMUS 1 MG: 1 CAPSULE ORAL at 09:07

## 2021-09-23 RX ADMIN — LEVOFLOXACIN 750 MG: 750 TABLET, FILM COATED ORAL at 09:06

## 2021-09-23 RX ADMIN — ALBUTEROL SULFATE 2.5 MG: 2.5 SOLUTION RESPIRATORY (INHALATION) at 07:47

## 2021-09-23 ASSESSMENT — ACTIVITIES OF DAILY LIVING (ADL)
ADLS_ACUITY_SCORE: 10

## 2021-09-23 ASSESSMENT — MIFFLIN-ST. JEOR: SCORE: 1098.89

## 2021-09-23 NOTE — PLAN OF CARE
Kecia appeared to sleep soundly between cares throughout the night. Noted she had coughing episodes when awake, but when sleeping seems to be quiet. She denied any pain or discomfort. Continues on Zosyn IV. Potentially will dialyze today. Will continue to monitor and report any significant changes.

## 2021-09-23 NOTE — PLAN OF CARE
"/59 (BP Location: Right arm)   Pulse 112   Temp 98.5  F (36.9  C) (Oral)   Resp 22   Ht 1.6 m (5' 3\")   Wt 55.1 kg (121 lb 7.6 oz)   LMP 06/07/2014 (Exact Date)   SpO2 92%   BMI 21.52 kg/m      Shift: 1500 - 2330  VS: Stable on RA, afebrile  Neuro: AOx4  Labs: Creatinine: 4.46; Hgb: 10.7  Respiratory: Denies dyspnea; Infrequent congested cough  Pain/Nausea/PRN: PRN tylenol x1; PRN zofran x1  Diet: Dialysis  LDA: L Fistula; R PIV (TKO)  Skin: No new findings this shift  Mobility: UAL  Plan: Possible discharge home pending negative cultures    Will give report to oncoming nurse. Pt left in stable condition, care relinquished at this time.     "

## 2021-09-23 NOTE — TELEPHONE ENCOUNTER
Tacrolimus level drawn while inpatient this morning 15.1 (goal 8-10), will decrease Tacrolimus dose to 0.5mg AM/ 0.5mg PM starting this evening with plan to repeat Tacro level next week (9/27-9/30). Pt agreeable to plan and correctly read back dosing instructions. Will call SOT office if further questions or concerns arise.     Spring Retana, RN, BSN, PHN  Inpatient Lung Transplant Coordinator  Solid Organ Transplant  [facility]  Pager: [phone]

## 2021-09-23 NOTE — DISCHARGE SUMMARY
[facility]  Discharge Summary - Medicine & Pediatrics       Date of Admission:  9/20/2021  Date of Discharge:  9/23/2021  1:10 PM  Discharging Provider: Rikki Whipple  Discharge Service: Shavon 3    Discharge Diagnoses   Fluid overload in setting of missed HD session  Healthcare associated pneumonia   ESRD on HD  S/p bilateral lung transplant for ILD    1) Follow up sputum culture - by transplant pulmonology   2) CBC at follow up visit - to monitor leukocytosis  3) Follow up with PCP for post-hospital f/u in 2-4 weeks     Follow-ups Needed After Discharge   Follow-up Appointments     Follow Up ([facility])      Follow up with primary care provider, Rosy Vu, within 14   days post hospital   Follow up with coby team in 2 weeks    Appointments on [place] and/or [facility] (with [facility] or [facility]   provider or service). Call [phone] if you haven't heard regarding   these appointments within 7 days of discharge.           Unresulted Labs Ordered in the Past 30 Days of this Admission     Date and Time Order Name Status Description    9/23/2021  9:10 AM Acid-Fast Bacilli Culture and Stain In process     9/23/2021 12:01 AM Posaconazole Level In process     9/21/2021  8:32 AM Acid-Fast Bacilli Culture and Stain In process     9/21/2021  8:32 AM Nocardia species culture In process     9/20/2021  9:09 PM Blood Culture Peripheral Blood Preliminary     9/20/2021  9:09 PM Blood Culture Peripheral Blood Preliminary       These results will be followed up by transplant pulmonology and PCP.     Discharge Disposition   Discharged to home  Condition at discharge: Stable    Hospital Course   Kecia Blue is a 60 year old woman admitted on 9/20/2021. 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      Fluid overload in setting of missed HD session  Healthcare associated pneumonia   Presented with productive cough w/ clear/intermittently pink sputum without septic signs and a BNP>97,000 in the setting of missing a day of dialysis most concerning for fluid overload. Pulse ox of 85% at home, 95% in the ED. Improved significantly after dialysis. Transplant pulmonology followed patient while in the hospital. She was started on broad spectrum antibiotics, narrowed to PO levofloxacin for total 10d course. On discharge she was breathing comfortably on room air, and without any signs of respiratory distress. She had mild leukocytosis (13) but clinically was without signs of infection, will be monitored at outpatient follow up.   - Levofloxacin renally adjusted (500 mg every other day) per pharmacy  - 2 week follow up with transplant team   - Sputum cultures collected to be followed by by transplant team    Consultations This Hospital Stay   PHARMACY TO DOSE VANCO  PHARMACY TO DOSE VANCO  PHARMACY TO DOSE VANCO  PULMONARY CF/TRANSPLANT ADULT IP CONSULT  NEPHROLOGY ESRD ADULT IP CONSULT  CARE MANAGEMENT / SOCIAL WORK IP CONSULT    Code Status   Full Code     The patient was discussed with Dr. Whipple.     Nakia Fair MD  [address] [address]  Phone: [phone]  ______________________________________________________________________    Physical Exam   Vital Signs: Temp: 97.7  F (36.5  C) Temp src: Oral BP: 126/68 Pulse: 88   Resp: 18 SpO2: 94 % O2 Device: None (Room air)    Weight: 121 lbs 3.2 oz  General Appearance: Well appearing, in NAD  Respiratory: normal WOB on ambient air, CTAB  Cardiovascular: regular rate  GI: soft NT ND  Skin: no lower extremity edema      Primary  Care Physician   Rosy Vu    Discharge Orders      Reason for your hospital stay    You were admitted due fluid overload and improved with dialysis. You were seen by your transplant team and treated for pneumonia. Please take levofloxacin as prescribed and follow up with your transplant team in 2 weeks.     Activity    Your activity upon discharge: activity as tolerated     Follow Up ([facility])    Follow up with primary care provider, Rosy Vu, within 14 days post hospital   Follow up with trasnplant team in 2 weeks    Appointments on [place] and/or [facility] (with [facility] or [facility] provider or service). Call [phone] if you haven't heard regarding these appointments within 7 days of discharge.     Diet    Follow this diet upon discharge: Orders Placed This Encounter      Dialysis Diet       Significant Results and Procedures   CXR 9/20/21 IMPRESSION:   1. Bilateral lung transplant with increased patchy interstitial  opacities concerning for infection though edema could have a similar  appearance. Small right pleural effusion.  2. Abnormally gas distended bowel in the upper abdomen.    Discharge Medications   Discharge Medication List as of 9/23/2021 12:33 PM      START taking these medications    Details   !! levofloxacin (LEVAQUIN) 500 MG tablet Take 1 tablet (500 mg) by mouth every 48 hours, Disp-3 tablet, R-0, E-Prescribe      !! levofloxacin (LEVAQUIN) 750 MG tablet Take 1 tablet (750 mg) by mouth once for 1 dose, Disp-1 tablet, R-0, E-Prescribe       !! - Potential duplicate medications found. Please discuss with provider.      CONTINUE these medications which have CHANGED    Details   azaTHIOprine (IMURAN) 50 MG tablet Take 0.5 tablets (25 mg) by mouth daily, Disp-30 tablet, R-0, E-Prescribe         CONTINUE these medications which have NOT CHANGED    Details   acetaminophen (TYLENOL) 325 MG tablet Take 1 tablet (325 mg) by mouth every 4 hours as needed for mild pain or  fever, Disp-60 tablet, No Print Out      albuterol (PROVENTIL) (2.5 MG/3ML) 0.083% neb solution Take 1 vial (2.5 mg) by nebulization 2 times daily, Disp-360 mL, R-4, E-Prescribe      budesonide (PULMICORT) 0.5 MG/2ML neb solution Take 2 mLs (0.5 mg) by nebulization 2 times daily, Disp-60 mL, R-11, E-Prescribe      calcium acetate (PHOSLO) 667 MG CAPS capsule Take 667 mg by mouth daily with food (largest meal), Historical      Magnesium Cl-Calcium Carbonate (SLOW-MAG) 71.5-119 MG TBEC Take 3 tablets by mouth four times a week Take on non dialysis days T/Th/Sa/Su, Disp-90 tablet, R-3, Historical      metoprolol tartrate (LOPRESSOR) 25 MG tablet 1 tablet (25 mg) by Oral or Feeding Tube route 2 times daily, Disp-60 tablet, R-11, E-Prescribe      multivitamin RENAL (RENAVITE RX/NEPHROVITE) 1 MG tablet Take 1 tablet by mouth daily, Disp-30 tablet, R-11, E-Prescribe      pantoprazole (PROTONIX) 40 MG EC tablet Take 1 tablet (40 mg) by mouth every morning (before breakfast), Disp-30 tablet, R-0, E-Prescribe      posaconazole (NOXAFIL) 100 MG EC tablet Take 3 tablets (300 mg) by mouth daily, Disp-270 tablet, R-0, E-PrescribePatient will run out of this medication in 3 days please refill ASA. [phone]      !! predniSONE (DELTASONE) 2.5 MG tablet Take 1 tablet (2.5 mg) by mouth every evening, Disp-30 tablet, R-11, E-Prescribe      !! predniSONE (DELTASONE) 5 MG tablet Take 1 tablet (5 mg) by mouth every morning, Disp-30 tablet, R-11, E-Prescribe      sulfamethoxazole-trimethoprim (BACTRIM) 400-80 MG tablet Take 1 tablet by mouth Every Mon, Wed, Fri Morning, Disp-13 tablet, R-11, E-Prescribe      tacrolimus (GENERIC EQUIVALENT) 0.5 MG capsule Take 2 capsules (1mg) every AM and 1 capsule (0.5mg) every PM, Disp-90 capsule, R-11, E-PrescribeTXP DT 3/1/2018 (Lung) TXP Dischg DT 3/24/2018 DX Lung replaced by transplant Z94.2 TX [facility] [place] ([place], [place])      tacrolimus (GENERIC  EQUIVALENT) 1 MG capsule Patient needs a refill for dose adjustments: currently on 0.5 mg BID., Disp-30 capsule, R-4, E-Prescribe       !! - Potential duplicate medications found. Please discuss with provider.      STOP taking these medications       ondansetron (ZOFRAN-ODT) 4 MG ODT tab Comments:   Reason for Stopping:             Allergies   No Known Allergies

## 2021-09-23 NOTE — PROGRESS NOTES
Pulmonary Medicine  Cystic Fibrosis - Lung Transplant Team  Progress Note  2021       Patient: Kecia Blue  MRN: [id number]  : 1960 (age 60 year old)  Transplant: 3/1/2018 (Lung), POD#[id number]  Admission date: 2021    Assessment & Plan:     Kecia Blue is a 60 year old female with a PMH significant for BSLT 3/1/18 for ILD with antisynthetase syndrome c/b right mainstem bronchial stenosis s/p multiple dilations (most recent 21), L-sided aspergillus empyema () s/p amphotericin beads (2020 and 2021), PJP PNA (2021), ESRD on HD, pAFib, pHTN, seronegative RA, EBV viremia, CMV viremia, C diff colitis, and DVT.  Patient was admitted 21 with increased DESHPANDE, productive cough and hypoxia after recent tissue debulking during a bronchoscopy (21).  DDx pneumonia vs fluid overload in setting of missed HD session.      Discharge recommendations:  - Sputum cultures pending from this morning, follow as OP  - Levofloxacin PO to complete a total 10 day course through   - Tacro level today supratherpeutic, dose reduced, repeat level as OP per coordinator (next week)  - Posaconazole level today still pending, follow as OP  - Pulmonary f/u in 2 weeks with repeat CXR and PFTs (we will arrange)     Possible HAP:  Pulmonary edema:  Acute hypoxic respiratory failure, Resolved:  S/p bilateral sequential lung transplant (BSLT) for ILD 2/2 CTD:    Stable pulmonary symptoms at recent OP visit on 9/15, PFTs improved from prior.  Last CMV () and DSA (9/15) negative.  CT chest (9/15) with increased RLL opacities (?worsening inflammation).  S/p interventional bronch on , then with progressive DESHPANDE, productive cough (pink frothy sputum), and new hypoxia (85%) at home since .  Hypoxia largely resolved at the time of admission.  CXR with increased diffuse patchy interstitial opacities (consistent with edema, less so infection).  Respiratory panel negative.  Etiology of pulmonary  symptoms likely pulmonary edema 2/2 hypervolemia in the setting missed HD sessions d/t bronch scheduling (NT pro BNP 97K), lower suspicion for infection (afebrile, procal mildly elevated at 0.21, mild leukocytosis).   - Sputum cultures pending from this morning, follow as OP  - Levofloxacin PO to complete a total 10 day course through 9/30 (prior Zosyn 9/20-9/23 and vancomycin 9/21)  - Pulmonary f/u in 2 weeks with repeat CXR and PFTs (we will arrange)     Immunosuppression:  - Tacrolimus 1 mg qAM/ 0.5 mg qPM (pt. missed 9/19 PM dose).  Goal level 8-10.  Level today 15.1, dose decreased to 0.5 mg BID, repeat level as OP per coordinator (next week).  - AZA 25 mg daily (resumed 9/15, prior held since 2019 for leukopenia and then empyema)  - Prednisone 5 mg qAM / 2.5 mg qPM     Prophylaxis:   - Bactrim MWF for PJP      Right main bronchial stenosis s/p dilatation: Follow with Dr. Norris for serial bronchoscopies with dilation. Last bronch 9/16/21 with debulking.  - Airway clearance with IS, and nebs: albuterol and Pulmicort BID  - Repeat bronch with dilation in 3 months (~12/16)     Left-sided Aspergillus empyema:   Persistent right pleural effusion: Noted 10/8/19.  CT scan (7/17/20) with increased LLL mass-like density, likely pleural-based.  S/p needle aspiration (8/13/20) with Aspergillus fumigatus on cultures s/p intrapleural amphotericin bead placement (11/20, 5/21/21).  Most recent BDG fungitell (9/15) 210, decreased from prior.  CXR on admission small persistent right pleural effusion, stable.  - Posaconazole, repeat level today still pending (QTc stable at 458, ALP with stable mild elevation since 2019, ALT recently mildly elevated)     EBV viremia: Most recent EBV stable at 38k/log 4.7 (9/15).   - Repeat EBV in 3 months (12/15)     ESRD on iHD (since 10/2019): Access is left AVF with partial graft.  On MWF iHD.  Renal transplant evaluation on hold for ongoing treatment of empyema.  Missed dialysis run on 9/16 d/t  "bronch and 9/20 with admission, BNP markedly elevated.  - Management per nephrology     We appreciate the excellent care provided by the [place] [id number] team.  Recommendations communicated via in person rounding and this note.  Will continue to follow along closely, please do not hesitate to call with any questions or concerns.     Patient discussed with Dr. Christensen.    Hina Huff, DNP, APRN, CNP  Inpatient Nurse Practitioner  Pulmonary CF/Transplant     Subjective & Interval History:     Feeling well with no complaints.  Remains on RA, cough persists but stable to improving with minimal sputum.  Showered this morning, tolerated well without dyspnea.  Eating well, no GI complaints.    Review of Systems:     C: No fever, no chills, no change in appetite  INTEGUMENTARY/SKIN: No rash or obvious new lesions  ENT/MOUTH: No sore throat, no sinus pain, no nasal congestion or drainage  RESP: See interval history  CV: No chest pain, no palpitations, no peripheral edema, no orthopnea  GI: No nausea, no vomiting, + stable loose stools, no reflux symptoms  : No dysuria  MUSCULOSKELETAL: No myalgias, no arthralgias  ENDOCRINE: Blood sugars with adequate control  NEURO: No headache, no numbness or tingling  PSYCHIATRIC: Mood stable    Physical Exam:     Vital signs:  Temp: 98.2  F (36.8  C) Temp src: Oral BP: 134/67 Pulse: (!) 134   Resp: 20 SpO2: 94 % O2 Device: None (Room air) Oxygen Delivery: 2 LPM Height: 160 cm (5' 3\") Weight: 55.1 kg (121 lb 7.6 oz)  I/O:     Intake/Output Summary (Last 24 hours) at 9/23/2021 0854  Last data filed at 9/22/2021 2259  Gross per 24 hour   Intake 500 ml   Output 1000 ml   Net -500 ml     Constitutional: Sitting up in bed,  at bedside, in no apparent distress.   HEENT: Eyes with pink conjunctivae, anicteric.  Oral mucosa moist without lesions.    PULM: Diminished RLL.  Crackles t/o, + inspiratory/expiratory rhonchi t/o right, no wheezes.  Non-labored breathing on RA.  CV: Normal S1 " and S2.  RRR.  No murmur, gallop, or rub.  No peripheral edema.   ABD: NABS, soft, nontender, nondistended.    MSK: Moves all extremities.  No apparent muscle wasting.   NEURO: Alert and oriented, conversant.   SKIN: Warm, dry.  No rash on limited exam.   PSYCH: Mood stable.     Lines, Drains, and Devices:  Peripheral IV 09/20/21 Anterior;Right (Active)   Site Assessment WDL 09/23/21 0300   Line Status Infusing 09/23/21 0300   Phlebitis Scale 0-->no symptoms 09/23/21 0300   Infiltration Scale 0 09/23/21 0300   Number of days: 3       Hemodialysis Vascular Access Arteriovenous graft Left Arm (Active)   Site Assessment WDL;Thrill present;Bruit present 09/23/21 0300   Cannulation Needle Size 16 09/22/21 1000   Dressing Intervention New dressing 09/21/21 1530   Dressing Status Old drainage 09/23/21 0300   Number of days: 241     Data:     LABS    CMP:   Recent Labs   Lab 09/23/21  0628 09/22/21  1008 09/21/21  1055 09/20/21  1903    134 136 132*   POTASSIUM 4.0 3.5 4.6 5.3   CHLORIDE 99 103 102 101   CO2 26 22 19* 23   ANIONGAP 9 9 15* 8   * 192* 161* 98   BUN 28 51* 92* 90*   CR 3.73* 4.46* 6.02* 5.85*   GFRESTIMATED 13* 10* 7* 7*   CHELSEY 8.2* 8.0* 8.2* 8.5   PROTTOTAL  --   --  6.7* 7.4   ALBUMIN  --   --  2.6* 3.0*   BILITOTAL  --   --  1.2 1.1   ALKPHOS  --   --  324* 321*   AST  --   --  22 22   ALT  --   --  80* 98*     CBC:   Recent Labs   Lab 09/23/21  0628 09/22/21  0610 09/21/21  1055 09/20/21  1903   WBC 13.9* 12.2* 11.5* 9.9   RBC 3.38* 3.48* 3.37* 3.67*   HGB 10.4* 10.7* 10.3* 11.3*   HCT 33.0* 33.3* 31.9* 36.1   MCV 98 96 95 98   MCH 30.8 30.7 30.6 30.8   MCHC 31.5 32.1 32.3 31.3*   RDW 15.2* 15.4* 15.2* 15.3*    200 218 228       INR: No lab results found in last 7 days.    Glucose:   Recent Labs   Lab 09/23/21  0628 09/22/21  1008 09/21/21  1055 09/20/21  1903   * 192* 161* 98       Blood Gas: No lab results found in last 7 days.    Culture Data No results for input(s): CULT in  the last 168 hours.    Virology Data:   Lab Results   Component Value Date    FLUAH1 Not Detected 09/21/2021    FLUAH3 Not Detected 09/21/2021    RY8599 Not Detected 09/21/2021    IFLUB Not Detected 09/21/2021    RSVA Not Detected 09/21/2021    RSVB Not Detected 09/21/2021    PIV1 Not Detected 09/21/2021    PIV2 Not Detected 09/21/2021    PIV3 Not Detected 09/21/2021    HMPV Not Detected 09/21/2021    HRVS Negative 01/24/2021    ADVBE Negative 01/24/2021    ADVC Negative 01/24/2021    ADVC Negative 12/23/2020    ADVC Negative 10/07/2019       Historical CMV results (last 3 of prior testing):  Lab Results   Component Value Date    CMVQNT Not Detected 09/21/2021    CMVQNT CMV DNA Not Detected 02/25/2021    CMVQNT <137 (A) 01/25/2021     Lab Results   Component Value Date    CMVLOG Not Calculated 02/25/2021    CMVLOG <2.1 01/25/2021    CMVLOG 2.4 (H) 01/24/2021       Urine Studies    Recent Labs   Lab Test 01/24/21  1729 10/21/19  2240   URINEPH 5.0 5.0   NITRITE Negative Negative   LEUKEST Moderate* Large*   WBCU 34* 115*       Most Recent [place] Pulmonary Function Testing (FVC/FEV1 only)  FVC-Pre   Date Value Ref Range Status   09/15/2021 1.52 L    06/11/2021 1.38 L    04/08/2021 1.41 L    12/09/2020 1.12 L      FVC-%Pred-Pre   Date Value Ref Range Status   09/15/2021 47 %    06/11/2021 42 %    04/08/2021 43 %    12/09/2020 34 %      FEV1-Pre   Date Value Ref Range Status   09/15/2021 1.29 L    06/11/2021 1.16 L    04/08/2021 1.09 L    12/09/2020 0.98 L      FEV1-%Pred-Pre   Date Value Ref Range Status   09/15/2021 50 %    06/11/2021 45 %    04/08/2021 42 %    12/09/2020 37 %        IMAGING    No results found for this or any previous visit (from the past 48 hour(s)).

## 2021-09-23 NOTE — DISCHARGE SUMMARY
Dialysis Discharge Summary Brief    [facility]  Division of Nephrology  Nephrology Discharge Dialysis Orders  Ph: [phone]  Fax: [phone]    Kecia Blue  MRN: [id number]  YOB: 1960    [facility]  Primary Nephrologist: Dr. Glasgow    Date of Admission: 9/20/2021  Date of Discharge: 9/23/2021  Discharge Diagnosis:  Had hypoxia at home, admitted to evaluate for possible infection in setting of lung transplantation and immune suppression. Also above dry weight on admission, dialyzed down to 54 kg at time of discharge.      ICD-10-CM    1. Pneumonia due to infectious organism, unspecified laterality, unspecified part of lung  J18.9 levofloxacin (LEVAQUIN) 750 MG tablet     levofloxacin (LEVAQUIN) 500 MG tablet   2. Healthcare-associated pneumonia  J18.9    3. Lung transplant status, bilateral (H)  Z94.2 azaTHIOprine (IMURAN) 50 MG tablet   4. Encounter for screening laboratory testing for severe acute respiratory syndrome coronavirus 2 (SARS-CoV-2)  Z20.822      [x] Resume all previous dialysis orders with exception as noted below    No new orders, resume prior.    Name of physician completing this form: Dom Grossman MD

## 2021-09-23 NOTE — PLAN OF CARE
DISCHARGE:  Patient with orders to discharge to her home    Discharge instructions, medications & follow ups reviewed with pt and her . Copy of discharge summary given to pt. PIV removed.  Patient in stable condition. AVSS. Pt had no further questions regarding discharge instructions and medications. Patient transferred out by her

## 2021-09-25 LAB
BACTERIA BLD CULT: NO GROWTH
BACTERIA BLD CULT: NO GROWTH

## 2021-09-29 ENCOUNTER — TELEPHONE (OUTPATIENT)
Dept: TRANSPLANT | Facility: CLINIC | Age: 61
End: 2021-09-29

## 2021-09-29 ENCOUNTER — LAB (OUTPATIENT)
Dept: LAB | Facility: CLINIC | Age: 61
End: 2021-09-29
Payer: COMMERCIAL

## 2021-09-29 DIAGNOSIS — Z94.2 S/P LUNG TRANSPLANT (H): ICD-10-CM

## 2021-09-29 PROCEDURE — 80197 ASSAY OF TACROLIMUS: CPT

## 2021-09-29 NOTE — TELEPHONE ENCOUNTER
DATE:  9/29/2021     TIME OF RECEIPT FROM LAB:  1:19    ORDERING PROVIDER:     LAB TEST:  CR = 5.1  ALP = 325    RESULTS GIVEN WITH READ-BACK TO (PROVIDER):  Gaye Castaneda    TIME LAB VALUE REPORTED TO PROVIDER:   1:25

## 2021-09-29 NOTE — TELEPHONE ENCOUNTER
Received page for critical value.   Patient on creat 5.1 - patient on HD  Alk phons 325 - at patient's baseline.

## 2021-10-01 ENCOUNTER — TELEPHONE (OUTPATIENT)
Dept: TRANSPLANT | Facility: CLINIC | Age: 61
End: 2021-10-01

## 2021-10-01 LAB
TACROLIMUS BLD-MCNC: 7.2 UG/L (ref 5–15)
TME LAST DOSE: NORMAL H
TME LAST DOSE: NORMAL H

## 2021-10-01 NOTE — TELEPHONE ENCOUNTER
Pt notified magnesium was 1.2 this week and should discuss with dialysis next time she is there. No change to tacrolimus level of 7.2 this week as patient's level was too high on higher dose of tacro, recheck next week.

## 2021-10-07 NOTE — PROGRESS NOTES
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 She was admitted 9/20-9/23 for volume overload in the setting of missed dialysis and possible HAP, discharged on levaquin to complete 10 day course. This is her first post discharge follow up.      1. Possible HAP:  Pulmonary edema  Bilateral lung transplant: CT chest 9/15 with increased RLL opacities. S/p interventional bronch on 9/16, then with progressive DESHPANDE, productive cough, and new hypoxia (85%) at home since 9/18. Hypoxia largely resolved at the time of admission. Etiology of pulmonary symptoms likely secondary to  hypervolemia in the setting missed HD sessions d/t bronch scheduling (NT pro BNP 97K). DSA 9/15 negative. No new pulmonary complaints today, feeling well overall. Sating 100% on room air. CXR reviewed with decreased opacities.  - Continue IS including AZA at low dose of 25 mg daily (has been on hold since 2019 for leukopenia per review of notes), continue tacrolimus (goal 8-10) and prednisone   - On Bactrim 3 times/week    2. Right main bronchial stenosis s/p ligation: last bronch on 9/16 with debulking. No issues today.   - Continue albuterol and pulmicort BID  - Scheduled for repeat bronch in mid December     3. Congestive hepatopathy with fibrosis: complicated by ascites secondary to portal hypertension. Chronic elevation of alk phos. Fluid optimization with dialysis. Recently seen by Dr. Denise.     4. H/o CMV viremia (D+/R+): VGCV ppx with steroid burst, completed 2/23. Last CMV on 9/29 of 63.      5. EBV  viremia: level 45,122 (log 4.7) on 9/15, up from 38,186 (log 4.6) on 5/1.   - Recheck at next visit    6. Left-sided aspergillus empyema: noted 10/8/19. CT scan on 7/17/20 with increased mass-like density, likely pleural-based, in LLL area s/p needle aspiration (8/13/20) with Aspergillus fumigatus on cultures s/p intrapleural bead placement (amphoterecin, 11/20). S/p chest tube drainage in May. Remains on prolonged course of posaconazole.   - Continue posaconazole  - Has a virtual visit set up with ID    7. ESRD on iHD (since 10/2019): access is AVF with partial graft. Continues now on iHD M/W/F , had them raise her dry weight a little bit and she is doing well with that, currently at 120 lbs. Transplant evaluation on hold for ongoing treatment of empyema. Follows with Nephrology    8. HTN: BP is 155/87 which is stable for her on non dialysis days.   - Continue metoprolol    Chronic issues:  1. Paroxysmal AFib  2. Hypomagnesemia  3. Dermatomyositis with Raynauds    RTC: 2 months prior to next bronch  Annuals: will obtain them in March; need to confirm Derm  Preventative: received her 3rd covid vaccine; colonoscopy 2022    Ame Chow PA-C  Pulmonary, Allergy, Critical Care and Sleep Medicine        Interval History:     Feeling fine, no shortness of breath, little bit of a cough, mainly in the am. No congestion or tightness. No fever or chills, no allergies. No chest pain or palpitations. No bloating or gas, no constipation or diarrhea. Completed her levaquin, was a little bit shaky with the medication, but tolerated it overall okay.           Review of Systems:   Please see HPI, otherwise the complete 10 point ROS is negative.           Past Medical and Surgical History:     Past Medical History:   Diagnosis Date     Acute on chronic respiratory failure with hypoxia (H) 02/21/2018     Anisocoria      Antisynthetase syndrome (H) 2014     Anxiety      Aspergillus (H)      Aspergillus pneumonia (H) 11/20/2020      Benign essential hypertension      C. difficile colitis      Cytomegalovirus (CMV) viremia (H)      Dermatomyositis (H)      Dysplasia of cervix, low grade (ESTRADA 1)      EBV (Franklin-Barr virus) viremia      ESRD (end stage renal disease) on dialysis (H)      ILD (interstitial lung disease) (H)      Lung replaced by transplant (H)      Osteopenia      Paroxysmal atrial fibrillation (H)      Pneumocystis jiroveci pneumonia (H)      PONV (postoperative nausea and vomiting)      Post-menopause      Pulmonary hypertension (H)      Raynaud's disease      Seronegative rheumatoid arthritis (H)      Past Surgical History:   Procedure Laterality Date     BRONCHOSCOPY (RIGID OR FLEXIBLE), DIAGNOSTIC N/A 4/10/2018    Procedure: COMBINED BRONCHOSCOPY (RIGID OR FLEXIBLE), LAVAGE;;  Surgeon: Mariposa Donohue MD;  Location:  GI     BRONCHOSCOPY (RIGID OR FLEXIBLE), DIAGNOSTIC N/A 12/23/2020    Procedure: BRONCHOSCOPY, WITH BRONCHOALVEOLAR LAVAGE;  Surgeon: Uri Bass MD;  Location:  GI     BRONCHOSCOPY (RIGID OR FLEXIBLE), DILATE BRONCHUS / TRACHEA N/A 10/11/2018    Procedure: BRONCHOSCOPY (RIGID OR FLEXIBLE), DILATE BRONCHUS / TRACHEA;  Flexible And Rigid Bronchoscopy and Dilation;  Surgeon: Wilber Lin MD;  [facility]     BRONCHOSCOPY FLEXIBLE N/A 3/13/2018    Procedure: BRONCHOSCOPY FLEXIBLE;  Flexible Bronchoscopy ;  Surgeon: Gissell Sanchez MD;  [facility] GI     BRONCHOSCOPY FLEXIBLE N/A 5/9/2018    Procedure: BRONCHOSCOPY FLEXIBLE;;  Surgeon: Wilber Lin MD;  Location:  GI     BRONCHOSCOPY FLEXIBLE AND RIGID N/A 9/10/2018    Procedure: BRONCHOSCOPY FLEXIBLE AND RIGID;  Flexible and Rigid Bronchoscopy with Balloon Dilation, tissue debulking with cryo, and Right mainstem bronchus stent placement;  Surgeon: Wilber Lin MD;  [facility]     BRONCHOSCOPY RIGID N/A 6/6/2018    Procedure: BRONCHOSCOPY RIGID;;  Surgeon: Lopez Macias MD;  [facility]      BRONCHOSCOPY, DILATE BRONCHUS, STENT BRONCHUS, COMBINED N/A 6/11/2018    Procedure: COMBINED BRONCHOSCOPY, DILATE BRONCHUS, STENT BRONCHUS;  Flexible Bronchoscopy, Balloon Dilation, Bronchial Washings;  Surgeon: Wilber Lin MD;  [facility]     BRONCHOSCOPY, DILATE BRONCHUS, STENT BRONCHUS, COMBINED Right 7/10/2018    Procedure: COMBINED BRONCHOSCOPY, DILATE BRONCHUS, STENT BRONCHUS;  Flexible Bronchoscopy, Balloon Dilation, Bronchial Washings  ;  Surgeon: Wilber Lin MD;  [facility]     BRONCHOSCOPY, DILATE BRONCHUS, STENT BRONCHUS, COMBINED N/A 8/2/2018    Procedure: COMBINED BRONCHOSCOPY, DILATE BRONCHUS, STENT BRONCHUS;  Flexible Bronchoscopy, Bronchial Washings, Balloon Dilation;  Surgeon: Wilber Lin MD;  [facility]     BRONCHOSCOPY, DILATE BRONCHUS, STENT BRONCHUS, COMBINED N/A 8/20/2018    Procedure: COMBINED BRONCHOSCOPY, DILATE BRONCHUS, STENT BRONCHUS;  Flexible Bronchoscopy, Balloon Dilation;  Surgeon: Wilber Lin MD;  [facility]     BRONCHOSCOPY, DILATE BRONCHUS, STENT BRONCHUS, COMBINED N/A 10/29/2018    Procedure: Flexible Bronchoscopy, Balloon Dilation, Stent Revision, Airway Examination And Therapeutic Suctioning, Cyro Tumor Debulking;  Surgeon: Wilber Lin MD;  [facility]     BRONCHOSCOPY, DILATE BRONCHUS, STENT BRONCHUS, COMBINED N/A 11/20/2018    Procedure: Rigid Bronchoscopy, Stent Removal and dilitation;  Surgeon: Wilber Lin MD;  [facility]     BRONCHOSCOPY, DILATE BRONCHUS, STENT BRONCHUS, COMBINED N/A 12/14/2018    Procedure: Flexible And Rigid Bronchoscopy, Balloon Dilation, Bronchial Washing;  Surgeon: Wilber Lin MD;  [facility]     BRONCHOSCOPY, DILATE BRONCHUS, STENT BRONCHUS, COMBINED N/A 1/17/2019    Procedure: Flexible And Rigid Bronchoscopy, Balloon Dilation.;  Surgeon: Wilber Lin MD;  [facility]     BRONCHOSCOPY, DILATE BRONCHUS, STENT BRONCHUS,  COMBINED N/A 3/7/2019    Procedure: Flexible and Rigid Bronchoscopy, Bronchial Washing, Balloon Dilation;  Surgeon: Wilber Lin MD;  [facility]     BRONCHOSCOPY, DILATE BRONCHUS, STENT BRONCHUS, COMBINED N/A 6/6/2019    Procedure: Rigid and Flexible Bronchoscopy, Balloon Dilation;  Surgeon: Wilber Lin MD;  [facility]     BRONCHOSCOPY, DILATE BRONCHUS, STENT BRONCHUS, COMBINED N/A 10/11/2019    Procedure: Flexible and Rigid Bronchoscopy, Balloon Dilation, Bronchoalveolar Lagave;  Surgeon: Wilber Lin MD;  [facility]     BRONCHOSCOPY, DILATE BRONCHUS, STENT BRONCHUS, COMBINED N/A 2/19/2021    Procedure: BRONCHOSCOPY, flexible, airway dilation, and bronchial wash;  Surgeon: Wilber Lin MD;  [facility]     BRONCHOSCOPY, DILATE BRONCHUS, STENT BRONCHUS, COMBINED N/A 4/9/2021    Procedure: BRONCHOSCOPY, flexible and rigid, Airway suctioning;  Surgeon: Mati Norris MD;  [facility]     CV RIGHT HEART CATH MEASUREMENTS RECORDED N/A 3/10/2020    Procedure: CV RIGHT HEART CATH;  Surgeon: Wai Garcia MD;  [facility]ARD[facility]AC [facility]     ENT SURGERY      tonsillectomy as a child     ESOPHAGOSCOPY, GASTROSCOPY, DUODENOSCOPY (EGD), COMBINED N/A 10/29/2018    Procedure: COMBINED ESOPHAGOSCOPY, GASTROSCOPY, DUODENOSCOPY (EGD) with biopsies and polypectomy;  Surgeon: Chente Bloom MD;  [facility]     INSERT EXTRACORPORAL MEMBRANE OXYGENATOR ADULT (OUTSIDE OR) N/A 2/27/2018    Procedure: INSERT EXTRACORPORAL MEMBRANE OXYGENATOR ADULT (OUTSIDE OR);  INSERT EXTRACORPORAL MEMBRANE OXYGENATOR ADULT (OUTSIDE OR) ;  Surgeon: Toby Hernandez MD;  [facility]     IR CVC TUNNEL PLACEMENT > 5 YRS OF AGE  10/25/2019     IR DIALYSIS FISTULOGRAM LEFT  3/2/2021     IR DIALYSIS MECH THROMB, PTA  3/2/2021     IR FLUORO 0-1 HOUR  5/7/2021     IR GASTRO JEJUNOSTOMY TUBE PLACEMENT  2/16/2021     IR PARACENTESIS  1/8/2020     IR  THORACENTESIS  9/13/2019     IR TRANSCATHETER BIOPSY  1/8/2020     LASER CO2 BRONCHOSCOPY N/A 4/30/2021    Procedure: Flexible and Rigid Bronchoscopy and Tissue Debulking with CO2 Laser Assistance;  Surgeon: Mati Norris MD;  [facility]     LASER CO2 BRONCHOSCOPY N/A 6/11/2021    Procedure: BRONCHOSCOPY, Flexible and Rigid Bronchoscopy, Tissue Debulking with cryo Assistance, airway dilation,;  Surgeon: Mati Norris MD;  [facility]     LASER CO2 BRONCHOSCOPY N/A 9/16/2021    Procedure: BRONCHOSCOPY, flexible and rigid, CO2 Laser Debulking;  Surgeon: Mati Norris MD;  [facility]     no prior surgery       REMOVE EXTRACORPORAL MEMBRANE OXYGENATOR ADULT N/A 3/3/2018    Procedure: REMOVE EXTRACORPORAL MEMBRANE OXYGENATOR ADULT;  Removal of Right Femoral Venous and Right Axillary Arterial Extracorporeal Membrane Oxygenator;  Surgeon: Toby Hernandez MD;  [facility]     TRANSPLANT LUNG RECIPIENT SINGLE X2 Bilateral 3/1/2018    Procedure: TRANSPLANT LUNG RECIPIENT SINGLE X2;  Median Sternotomy, Extracorporeal Membrane Oxygenator, bilateral sequential lung transplant;  Surgeon: Toby Hernandez MD;  Location:  OR           Family History:     Family History   Problem Relation Age of Onset     Hypertension Mother      Arthritis Mother      Pancreatic Cancer Father      Diabetes Father             Social History:     Social History     Socioeconomic History     Marital status:      Spouse name: Not on file     Number of children: Not on file     Years of education: Not on file     Highest education level: Not on file   Occupational History     Not on file   Tobacco Use     Smoking status: Never Smoker     Smokeless tobacco: Never Used   Substance and Sexual Activity     Alcohol use: No     Alcohol/week: 1.0 standard drinks     Types: 1 Glasses of wine per week     Drug use: No     Sexual activity: Not on file   Other Topics Concern     Parent/sibling w/ CABG, MI or  angioplasty before 65F 55M? No   Social History Narrative    3/6/2019 - Lives with . Has three daughters. Four grandchildren (two ). No pets. Travelled previously to [facility]. Has visited [place] several times.      Social Determinants of Health     Financial Resource Strain:      Difficulty of Paying Living Expenses:    Food Insecurity:      Worried About Running Out of Food in the Last Year:      Ran Out of Food in the Last Year:    Transportation Needs:      Lack of Transportation (Medical):      Lack of Transportation (Non-Medical):    Physical Activity:      Days of Exercise per Week:      Minutes of Exercise per Session:    Stress:      Feeling of Stress :    Social Connections:      Frequency of Communication with Friends and Family:      Frequency of Social Gatherings with Friends and Family:      Attends Yarsanism Services:      Active Member of Clubs or Organizations:      Attends Club or Organization Meetings:      Marital Status:    Intimate Partner Violence:      Fear of Current or Ex-Partner:      Emotionally Abused:      Physically Abused:      Sexually Abused:             Medications:     Current Outpatient Medications   Medication     acetaminophen (TYLENOL) 325 MG tablet     albuterol (PROVENTIL) (2.5 MG/3ML) 0.083% neb solution     azaTHIOprine (IMURAN) 50 MG tablet     budesonide (PULMICORT) 0.5 MG/2ML neb solution     calcium acetate (PHOSLO) 667 MG CAPS capsule     Magnesium Cl-Calcium Carbonate (SLOW-MAG) 71.5-119 MG TBEC     metoprolol tartrate (LOPRESSOR) 25 MG tablet     multivitamin RENAL (RENAVITE RX/NEPHROVITE) 1 MG tablet     pantoprazole (PROTONIX) 40 MG EC tablet     posaconazole (NOXAFIL) 100 MG EC tablet     predniSONE (DELTASONE) 2.5 MG tablet     predniSONE (DELTASONE) 5 MG tablet     sulfamethoxazole-trimethoprim (BACTRIM) 400-80 MG tablet     tacrolimus (GENERIC EQUIVALENT) 0.5 MG capsule     tacrolimus (GENERIC EQUIVALENT) 1 MG capsule     No current  "facility-administered medications for this visit.            Physical Exam:   BP (!) 155/87 (BP Location: Right arm, Patient Position: Chair, Cuff Size: Adult Regular)   Pulse 105   Ht 1.6 m (5' 3\")   Wt 54.8 kg (120 lb 11.5 oz)   LMP 06/07/2014 (Exact Date)   SpO2 100%   BMI 21.38 kg/m      GENERAL: alert, NAD  HEENT: NCAT, EOMI, no scleral icterus, oral mucosa moist and without lesions  Neck: no cervical or supraclavicular adenopathy  Lungs: moderate airflow, scattered fine crackles, increased in left base, sonorous expirations  CV: RRR, S1S2, no murmurs noted  Abdomen: normoactive BS, soft, non tender   Lymph:trace BLE edema  Neuro: AAO X 3, CN 2-12 grossly intact  Psychiatric: normal affect, good eye contact  Skin: no rash, jaundice or lesions on limited exam  MSK: left knee in brace         Data:   All laboratory and imaging data reviewed.      Recent Results (from the past 168 hour(s))   Basic metabolic panel    Collection Time: 10/12/21 12:51 PM   Result Value Ref Range    Sodium 135 133 - 144 mmol/L    Potassium 4.0 3.4 - 5.3 mmol/L    Chloride 102 94 - 109 mmol/L    Carbon Dioxide (CO2) 29 20 - 32 mmol/L    Anion Gap 4 3 - 14 mmol/L    Urea Nitrogen 31 (H) 7 - 30 mg/dL    Creatinine 3.85 (H) 0.52 - 1.04 mg/dL    Calcium 8.5 8.5 - 10.1 mg/dL    Glucose 108 (H) 70 - 99 mg/dL    GFR Estimate 12 (L) >60 mL/min/1.73m2   Magnesium    Collection Time: 10/12/21 12:51 PM   Result Value Ref Range    Magnesium 1.8 1.6 - 2.3 mg/dL   Tacrolimus level    Collection Time: 10/12/21 12:51 PM   Result Value Ref Range    Tacrolimus by Tandem Mass Spectrometry 7.6 5.0 - 15.0 ug/L    Tacrolimus Last Dose Date 10/12/2021     Tacrolimus Last Dose Time 12:00 AM    Hepatic panel    Collection Time: 10/12/21 12:51 PM   Result Value Ref Range    Bilirubin Total 0.8 0.2 - 1.3 mg/dL    Bilirubin Direct 0.4 (H) 0.0 - 0.2 mg/dL    Protein Total 7.8 6.8 - 8.8 g/dL    Albumin 2.8 (L) 3.4 - 5.0 g/dL    Alkaline Phosphatase 316 (H) 40 " - 150 U/L    AST 24 0 - 45 U/L    ALT 52 (H) 0 - 50 U/L   General PFT Lab (Please always keep checked)    Collection Time: 10/12/21  1:23 PM   Result Value Ref Range    FVC-Pred 3.23 L    FVC-Pre 1.54 L    FVC-%Pred-Pre 47 %    FEV1-Pre 1.22 L    FEV1-%Pred-Pre 47 %    FEV1FVC-Pred 80 %    FEV1FVC-Pre 79 %    FEFMax-Pred 6.40 L/sec    FEFMax-Pre 3.61 L/sec    FEFMax-%Pred-Pre 56 %    FEF2575-Pred 2.36 L/sec    FEF2575-Pre 1.00 L/sec    MPM2874-%Pred-Pre 42 %    ExpTime-Pre 7.30 sec    FIFMax-Pre 2.65 L/sec    FEV1FEV6-Pred 81 %    FEV1FEV6-Pre 78 %     PFT interpretation:  Maneuver: valid and met ATS guidelines  Normal ratio with decreased FEV1 and FVC  Compared to prior: FEV1 of 1.22 is 70 ml below prior  Decrease in FVC is likely related to restriction; lung volumes would be necessary to determine

## 2021-10-09 ENCOUNTER — HEALTH MAINTENANCE LETTER (OUTPATIENT)
Age: 61
End: 2021-10-09

## 2021-10-12 ENCOUNTER — LAB (OUTPATIENT)
Dept: LAB | Facility: CLINIC | Age: 61
End: 2021-10-12
Payer: MEDICARE

## 2021-10-12 ENCOUNTER — ANCILLARY PROCEDURE (OUTPATIENT)
Dept: GENERAL RADIOLOGY | Facility: CLINIC | Age: 61
End: 2021-10-12
Attending: PHYSICIAN ASSISTANT
Payer: MEDICARE

## 2021-10-12 ENCOUNTER — OFFICE VISIT (OUTPATIENT)
Dept: PULMONOLOGY | Facility: CLINIC | Age: 61
End: 2021-10-12
Attending: PHYSICIAN ASSISTANT
Payer: MEDICARE

## 2021-10-12 VITALS
HEIGHT: 63 IN | BODY MASS INDEX: 21.39 KG/M2 | DIASTOLIC BLOOD PRESSURE: 87 MMHG | HEART RATE: 105 BPM | OXYGEN SATURATION: 100 % | SYSTOLIC BLOOD PRESSURE: 155 MMHG | WEIGHT: 120.72 LBS

## 2021-10-12 DIAGNOSIS — Z94.2 S/P LUNG TRANSPLANT (H): ICD-10-CM

## 2021-10-12 DIAGNOSIS — Z94.2 LUNG REPLACED BY TRANSPLANT (H): ICD-10-CM

## 2021-10-12 DIAGNOSIS — Z94.2 LUNG REPLACED BY TRANSPLANT (H): Primary | ICD-10-CM

## 2021-10-12 LAB
ALBUMIN SERPL-MCNC: 2.8 G/DL (ref 3.4–5)
ALP SERPL-CCNC: 316 U/L (ref 40–150)
ALT SERPL W P-5'-P-CCNC: 52 U/L (ref 0–50)
ANION GAP SERPL CALCULATED.3IONS-SCNC: 4 MMOL/L (ref 3–14)
AST SERPL W P-5'-P-CCNC: 24 U/L (ref 0–45)
BILIRUB DIRECT SERPL-MCNC: 0.4 MG/DL (ref 0–0.2)
BILIRUB SERPL-MCNC: 0.8 MG/DL (ref 0.2–1.3)
BUN SERPL-MCNC: 31 MG/DL (ref 7–30)
CALCIUM SERPL-MCNC: 8.5 MG/DL (ref 8.5–10.1)
CHLORIDE BLD-SCNC: 102 MMOL/L (ref 94–109)
CO2 SERPL-SCNC: 29 MMOL/L (ref 20–32)
CREAT SERPL-MCNC: 3.85 MG/DL (ref 0.52–1.04)
EXPTIME-PRE: 7.3 SEC
FEF2575-%PRED-PRE: 42 %
FEF2575-PRE: 1 L/SEC
FEF2575-PRED: 2.36 L/SEC
FEFMAX-%PRED-PRE: 56 %
FEFMAX-PRE: 3.61 L/SEC
FEFMAX-PRED: 6.4 L/SEC
FEV1-%PRED-PRE: 47 %
FEV1-PRE: 1.22 L
FEV1FEV6-PRE: 78 %
FEV1FEV6-PRED: 81 %
FEV1FVC-PRE: 79 %
FEV1FVC-PRED: 80 %
FIFMAX-PRE: 2.65 L/SEC
FVC-%PRED-PRE: 47 %
FVC-PRE: 1.54 L
FVC-PRED: 3.23 L
GFR SERPL CREATININE-BSD FRML MDRD: 12 ML/MIN/1.73M2
GLUCOSE BLD-MCNC: 108 MG/DL (ref 70–99)
MAGNESIUM SERPL-MCNC: 1.8 MG/DL (ref 1.6–2.3)
POTASSIUM BLD-SCNC: 4 MMOL/L (ref 3.4–5.3)
PROT SERPL-MCNC: 7.8 G/DL (ref 6.8–8.8)
SODIUM SERPL-SCNC: 135 MMOL/L (ref 133–144)
TACROLIMUS BLD-MCNC: 7.6 UG/L (ref 5–15)
TME LAST DOSE: NORMAL H
TME LAST DOSE: NORMAL H

## 2021-10-12 PROCEDURE — G0463 HOSPITAL OUTPT CLINIC VISIT: HCPCS | Mod: 25

## 2021-10-12 PROCEDURE — 99214 OFFICE O/P EST MOD 30 MIN: CPT | Mod: 25 | Performed by: PHYSICIAN ASSISTANT

## 2021-10-12 PROCEDURE — 36415 COLL VENOUS BLD VENIPUNCTURE: CPT | Performed by: PATHOLOGY

## 2021-10-12 PROCEDURE — 94375 RESPIRATORY FLOW VOLUME LOOP: CPT | Performed by: PHYSICIAN ASSISTANT

## 2021-10-12 PROCEDURE — 71046 X-RAY EXAM CHEST 2 VIEWS: CPT | Mod: GC | Performed by: RADIOLOGY

## 2021-10-12 PROCEDURE — 80197 ASSAY OF TACROLIMUS: CPT | Mod: 90 | Performed by: PATHOLOGY

## 2021-10-12 PROCEDURE — 83735 ASSAY OF MAGNESIUM: CPT | Performed by: PATHOLOGY

## 2021-10-12 PROCEDURE — 87799 DETECT AGENT NOS DNA QUANT: CPT | Mod: 90 | Performed by: PATHOLOGY

## 2021-10-12 PROCEDURE — 82248 BILIRUBIN DIRECT: CPT

## 2021-10-12 PROCEDURE — 80053 COMPREHEN METABOLIC PANEL: CPT | Performed by: PATHOLOGY

## 2021-10-12 RX ORDER — POSACONAZOLE 100 MG/1
300 TABLET, DELAYED RELEASE ORAL DAILY
Qty: 90 TABLET | Refills: 1 | Status: SHIPPED | OUTPATIENT
Start: 2021-10-12 | End: 2021-11-15

## 2021-10-12 ASSESSMENT — MIFFLIN-ST. JEOR: SCORE: 1096.71

## 2021-10-12 ASSESSMENT — PAIN SCALES - GENERAL: PAINLEVEL: NO PAIN (0)

## 2021-10-12 NOTE — NURSING NOTE
Transplant Coordinator Note     Reason for visit: Post lung transplant follow up visit   Coordinator: Present (via phone)  Caregiver: Ranjan Walter concerns addressed today:  1. Doing well. Awaiting plan from ID for candida empyema in terms of moving forward with kidney transplant. Appointment on Friday.   2. Dialysis going well.   3. Received flu vaccine this season as well as 3rd COVID.   4. Wondering about MSSA.   5. X-ray improved today.     Activity/rehab:    Oxygen needs: RA  Pain management/RX: Denies  High risk donor: Yes  CMV status: D+/R+  DVT/PE: H/o DVT  Post op AFIB/follow up with EP: One time occurrence of a-fib  PJP prophylactic: Bactrim     Pt Education: medications (use/dose/side effects), how/when to call coordinator, frequency of labs, s/s of infection/rejection, call prior to starting any new medications, lab/vital sign book     Health Maintenance:     Last colonoscopy:     Next colonoscopy due:     Dermatology:    Vaccinations this visit:      Labs, CXR, PFTs reviewed with patient  Medication record reviewed and reconciled  Questions and concerns addressed     Patient Instructions  1. Continue to hydrate with 60-70 oz fluids daily.  2. Continue to exercise daily or most days of the week.  3. Follow up with your primary care provider for annual gender health maintenance procedures.  4. Follow up with colonoscopy schedule.  5. Follow up with annual dermatology visits.  6. Call Mikayla with any issues. We'll follow up after your infectious disease appointment.     Next transplant clinic appointment: TBD, but likely December before your next bronch with CXR, labs and PFTs  Next lab draw: monthly    AVS printed at time of check out

## 2021-10-12 NOTE — NURSING NOTE
No chief complaint on file.    Vitals were taken and medications were reconciled.     Cecille Reed RMA  2:09 PM

## 2021-10-12 NOTE — PATIENT INSTRUCTIONS
Patient Instructions  1. Continue to hydrate with 60-70 oz fluids daily.  2. Continue to exercise daily or most days of the week.  3. Follow up with your primary care provider for annual gender health maintenance procedures.  4. Follow up with colonoscopy schedule.  5. Follow up with annual dermatology visits.  6. Call Mikayla with any issues. We'll follow up after your infectious disease appointment.     Next transplant clinic appointment: TBD, but likely December before your next bronch with CXR, labs and PFTs  Next lab draw: monthly

## 2021-10-12 NOTE — LETTER
10/12/2021         RE: Kecia Blue  [address] [place] [address]        Dear Colleague,    Thank you for referring your patient, Kecia Blue, to the [facility] [place]. Please see a copy of my visit note below.    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 She was admitted 9/20-9/23 for volume overload in the setting of missed dialysis and possible HAP, discharged on levaquin to complete 10 day course. This is her first post discharge follow up.      1. Possible HAP:  Pulmonary edema  Bilateral lung transplant: CT chest 9/15 with increased RLL opacities. S/p interventional bronch on 9/16, then with progressive DESHPANDE, productive cough, and new hypoxia (85%) at home since 9/18. Hypoxia largely resolved at the time of admission. Etiology of pulmonary symptoms likely secondary to  hypervolemia in the setting missed HD sessions d/t bronch scheduling (NT pro BNP 97K). DSA 9/15 negative. No new pulmonary complaints today, feeling well overall. Sating 100% on room air. CXR reviewed with decreased opacities.  - Continue IS including AZA at low dose of 25 mg daily (has been on hold since 2019 for leukopenia per review of notes), continue tacrolimus (goal 8-10) and prednisone   - On Bactrim 3 times/week    2. Right main bronchial stenosis s/p ligation: last bronch on 9/16 with debulking. No issues today.   - Continue albuterol and pulmicort BID  - Scheduled for repeat bronch in mid December     3. Congestive  hepatopathy with fibrosis: complicated by ascites secondary to portal hypertension. Chronic elevation of alk phos. Fluid optimization with dialysis. Recently seen by Dr. Denise.     4. H/o CMV viremia (D+/R+): VGCV ppx with steroid burst, completed 2/23. Last CMV on 9/29 of 63.      5. EBV viremia: level 45,122 (log 4.7) on 9/15, up from 38,186 (log 4.6) on 5/1.   - Recheck at next visit    6. Left-sided aspergillus empyema: noted 10/8/19. CT scan on 7/17/20 with increased mass-like density, likely pleural-based, in LLL area s/p needle aspiration (8/13/20) with Aspergillus fumigatus on cultures s/p intrapleural bead placement (amphoterecin, 11/20). S/p chest tube drainage in May. Remains on prolonged course of posaconazole.   - Continue posaconazole  - Has a virtual visit set up with ID    7. ESRD on iHD (since 10/2019): access is AVF with partial graft. Continues now on iHD M/W/F , had them raise her dry weight a little bit and she is doing well with that, currently at 120 lbs. Transplant evaluation on hold for ongoing treatment of empyema. Follows with Nephrology    8. HTN: BP is 155/87 which is stable for her on non dialysis days.   - Continue metoprolol    Chronic issues:  1. Paroxysmal AFib  2. Hypomagnesemia  3. Dermatomyositis with Raynauds    RTC: 2 months prior to next bronch  Annuals: will obtain them in March; need to confirm Derm  Preventative: received her 3rd covid vaccine; colonoscopy 2022    Ame Chow PA-C  Pulmonary, Allergy, Critical Care and Sleep Medicine        Interval History:     Feeling fine, no shortness of breath, little bit of a cough, mainly in the am. No congestion or tightness. No fever or chills, no allergies. No chest pain or palpitations. No bloating or gas, no constipation or diarrhea. Completed her levaquin, was a little bit shaky with the medication, but tolerated it overall okay.           Review of Systems:   Please see HPI, otherwise the complete 10 point ROS is negative.            Past Medical and Surgical History:     Past Medical History:   Diagnosis Date     Acute on chronic respiratory failure with hypoxia (H) 02/21/2018     Anisocoria      Antisynthetase syndrome (H) 2014     Anxiety      Aspergillus (H)      Aspergillus pneumonia (H) 11/20/2020     Benign essential hypertension      C. difficile colitis      Cytomegalovirus (CMV) viremia (H)      Dermatomyositis (H)      Dysplasia of cervix, low grade (ESTRADA 1)      EBV (Franklin-Barr virus) viremia      ESRD (end stage renal disease) on dialysis (H)      ILD (interstitial lung disease) (H)      Lung replaced by transplant (H)      Osteopenia      Paroxysmal atrial fibrillation (H)      Pneumocystis jiroveci pneumonia (H)      PONV (postoperative nausea and vomiting)      Post-menopause      Pulmonary hypertension (H)      Raynaud's disease      Seronegative rheumatoid arthritis (H)      Past Surgical History:   Procedure Laterality Date     BRONCHOSCOPY (RIGID OR FLEXIBLE), DIAGNOSTIC N/A 4/10/2018    Procedure: COMBINED BRONCHOSCOPY (RIGID OR FLEXIBLE), LAVAGE;;  Surgeon: Mariposa Donohue MD;  [facility]: [facility]     BRONCHOSCOPY (RIGID OR FLEXIBLE), DIAGNOSTIC N/A 12/23/2020    Procedure: BRONCHOSCOPY, WITH BRONCHOALVEOLAR LAVAGE;  Surgeon: Uri Bass MD;  [facility]: [facility]     BRONCHOSCOPY (RIGID OR FLEXIBLE), DILATE BRONCHUS / TRACHEA N/A 10/11/2018    Procedure: BRONCHOSCOPY (RIGID OR FLEXIBLE), DILATE BRONCHUS / TRACHEA;  Flexible And Rigid Bronchoscopy and Dilation;  Surgeon: Wilber Lin MD;  [facility]     BRONCHOSCOPY FLEXIBLE N/A 3/13/2018    Procedure: BRONCHOSCOPY FLEXIBLE;  Flexible Bronchoscopy ;  Surgeon: Gissell Sanchez MD;  [facility]     BRONCHOSCOPY FLEXIBLE N/A 5/9/2018    Procedure: BRONCHOSCOPY FLEXIBLE;;  Surgeon: Wilber Lin MD;  [facility]     BRONCHOSCOPY FLEXIBLE AND RIGID N/A 9/10/2018    Procedure: BRONCHOSCOPY FLEXIBLE AND RIGID;  Flexible and Rigid  Bronchoscopy with Balloon Dilation, tissue debulking with cryo, and Right mainstem bronchus stent placement;  Surgeon: Wilber Lin MD;  [facility]     BRONCHOSCOPY RIGID N/A 6/6/2018    Procedure: BRONCHOSCOPY RIGID;;  Surgeon: Lopez Macias MD;  [facility]     BRONCHOSCOPY, DILATE BRONCHUS, STENT BRONCHUS, COMBINED N/A 6/11/2018    Procedure: COMBINED BRONCHOSCOPY, DILATE BRONCHUS, STENT BRONCHUS;  Flexible Bronchoscopy, Balloon Dilation, Bronchial Washings;  Surgeon: Wilber Lin MD;  [facility]     BRONCHOSCOPY, DILATE BRONCHUS, STENT BRONCHUS, COMBINED Right 7/10/2018    Procedure: COMBINED BRONCHOSCOPY, DILATE BRONCHUS, STENT BRONCHUS;  Flexible Bronchoscopy, Balloon Dilation, Bronchial Washings  ;  Surgeon: Wilber Lin MD;  [facility]     BRONCHOSCOPY, DILATE BRONCHUS, STENT BRONCHUS, COMBINED N/A 8/2/2018    Procedure: COMBINED BRONCHOSCOPY, DILATE BRONCHUS, STENT BRONCHUS;  Flexible Bronchoscopy, Bronchial Washings, Balloon Dilation;  Surgeon: Wilber Lin MD;  [facility]     BRONCHOSCOPY, DILATE BRONCHUS, STENT BRONCHUS, COMBINED N/A 8/20/2018    Procedure: COMBINED BRONCHOSCOPY, DILATE BRONCHUS, STENT BRONCHUS;  Flexible Bronchoscopy, Balloon Dilation;  Surgeon: Wilber Lin MD;  [facility]     BRONCHOSCOPY, DILATE BRONCHUS, STENT BRONCHUS, COMBINED N/A 10/29/2018    Procedure: Flexible Bronchoscopy, Balloon Dilation, Stent Revision, Airway Examination And Therapeutic Suctioning, Cyro Tumor Debulking;  Surgeon: Wilber Lin MD;  [facility]     BRONCHOSCOPY, DILATE BRONCHUS, STENT BRONCHUS, COMBINED N/A 11/20/2018    Procedure: Rigid Bronchoscopy, Stent Removal and dilitation;  Surgeon: Wilber Lin MD;  [facility]     BRONCHOSCOPY, DILATE BRONCHUS, STENT BRONCHUS, COMBINED N/A 12/14/2018    Procedure: Flexible And Rigid Bronchoscopy, Balloon Dilation, Bronchial Washing;  Surgeon:  Wilber Lin MD;  [facility]     BRONCHOSCOPY, DILATE BRONCHUS, STENT BRONCHUS, COMBINED N/A 1/17/2019    Procedure: Flexible And Rigid Bronchoscopy, Balloon Dilation.;  Surgeon: Wilber Lin MD;  [facility]     BRONCHOSCOPY, DILATE BRONCHUS, STENT BRONCHUS, COMBINED N/A 3/7/2019    Procedure: Flexible and Rigid Bronchoscopy, Bronchial Washing, Balloon Dilation;  Surgeon: Wilber Lin MD;  [facility]     BRONCHOSCOPY, DILATE BRONCHUS, STENT BRONCHUS, COMBINED N/A 6/6/2019    Procedure: Rigid and Flexible Bronchoscopy, Balloon Dilation;  Surgeon: Wilber Lin MD;  [facility]     BRONCHOSCOPY, DILATE BRONCHUS, STENT BRONCHUS, COMBINED N/A 10/11/2019    Procedure: Flexible and Rigid Bronchoscopy, Balloon Dilation, Bronchoalveolar Lagave;  Surgeon: Wilber Lin MD;  [facility]     BRONCHOSCOPY, DILATE BRONCHUS, STENT BRONCHUS, COMBINED N/A 2/19/2021    Procedure: BRONCHOSCOPY, flexible, airway dilation, and bronchial wash;  Surgeon: Wilber Lin MD;  [facility]     BRONCHOSCOPY, DILATE BRONCHUS, STENT BRONCHUS, COMBINED N/A 4/9/2021    Procedure: BRONCHOSCOPY, flexible and rigid, Airway suctioning;  Surgeon: Mati Norris MD;  [facility]     CV RIGHT HEART CATH MEASUREMENTS RECORDED N/A 3/10/2020    Procedure: CV RIGHT HEART CATH;  Surgeon: Wai Garcia MD;  [facility]     ENT SURGERY      tonsillectomy as a child     ESOPHAGOSCOPY, GASTROSCOPY, DUODENOSCOPY (EGD), COMBINED N/A 10/29/2018    Procedure: COMBINED ESOPHAGOSCOPY, GASTROSCOPY, DUODENOSCOPY (EGD) with biopsies and polypectomy;  Surgeon: Chente Bloom MD;  [facility]     INSERT EXTRACORPORAL MEMBRANE OXYGENATOR ADULT (OUTSIDE OR) N/A 2/27/2018    Procedure: INSERT EXTRACORPORAL MEMBRANE OXYGENATOR ADULT (OUTSIDE OR);  INSERT EXTRACORPORAL MEMBRANE OXYGENATOR ADULT (OUTSIDE OR) ;  Surgeon: Toby Hernandez,  MD;  [facility]     IR CVC TUNNEL PLACEMENT > 5 YRS OF AGE  10/25/2019     IR DIALYSIS FISTULOGRAM LEFT  3/2/2021     IR DIALYSIS MECH THROMB, PTA  3/2/2021     IR FLUORO 0-1 HOUR  5/7/2021     IR GASTRO JEJUNOSTOMY TUBE PLACEMENT  2/16/2021     IR PARACENTESIS  1/8/2020     IR THORACENTESIS  9/13/2019     IR TRANSCATHETER BIOPSY  1/8/2020     LASER CO2 BRONCHOSCOPY N/A 4/30/2021    Procedure: Flexible and Rigid Bronchoscopy and Tissue Debulking with CO2 Laser Assistance;  Surgeon: Mati Norris MD;  [facility]     LASER CO2 BRONCHOSCOPY N/A 6/11/2021    Procedure: BRONCHOSCOPY, Flexible and Rigid Bronchoscopy, Tissue Debulking with cryo Assistance, airway dilation,;  Surgeon: Mati Norris MD;  [facility]     LASER CO2 BRONCHOSCOPY N/A 9/16/2021    Procedure: BRONCHOSCOPY, flexible and rigid, CO2 Laser Debulking;  Surgeon: Mati Norris MD;  [facility]     no prior surgery       REMOVE EXTRACORPORAL MEMBRANE OXYGENATOR ADULT N/A 3/3/2018    Procedure: REMOVE EXTRACORPORAL MEMBRANE OXYGENATOR ADULT;  Removal of Right Femoral Venous and Right Axillary Arterial Extracorporeal Membrane Oxygenator;  Surgeon: Toby Hernandez MD;  [facility]     TRANSPLANT LUNG RECIPIENT SINGLE X2 Bilateral 3/1/2018    Procedure: TRANSPLANT LUNG RECIPIENT SINGLE X2;  Median Sternotomy, Extracorporeal Membrane Oxygenator, bilateral sequential lung transplant;  Surgeon: Toby Hernandez MD;  [facility]           Family History:     Family History   Problem Relation Age of Onset     Hypertension Mother      Arthritis Mother      Pancreatic Cancer Father      Diabetes Father             Social History:     Social History     Socioeconomic History     Marital status:      Spouse name: Not on file     Number of children: Not on file     Years of education: Not on file     Highest education level: Not on file   Occupational History     Not on file   Tobacco Use     Smoking  status: Never Smoker     Smokeless tobacco: Never Used   Substance and Sexual Activity     Alcohol use: No     Alcohol/week: 1.0 standard drinks     Types: 1 Glasses of wine per week     Drug use: No     Sexual activity: Not on file   Other Topics Concern     Parent/sibling w/ CABG, MI or angioplasty before 65F 55M? No   Social History Narrative    3/6/2019 - Lives with . Has three daughters. Four grandchildren (two ). No pets. Travelled previously to [facility]. Has visited [place] several times.      Social Determinants of Health     Financial Resource Strain:      Difficulty of Paying Living Expenses:    Food Insecurity:      Worried About Running Out of Food in the Last Year:      Ran Out of Food in the Last Year:    Transportation Needs:      Lack of Transportation (Medical):      Lack of Transportation (Non-Medical):    Physical Activity:      Days of Exercise per Week:      Minutes of Exercise per Session:    Stress:      Feeling of Stress :    Social Connections:      Frequency of Communication with Friends and Family:      Frequency of Social Gatherings with Friends and Family:      Attends [employer] Services:      Active Member of Clubs or Organizations:      Attends Club or Organization Meetings:      Marital Status:    Intimate Partner Violence:      Fear of Current or Ex-Partner:      Emotionally Abused:      Physically Abused:      Sexually Abused:             Medications:     Current Outpatient Medications   Medication     acetaminophen (TYLENOL) 325 MG tablet     albuterol (PROVENTIL) (2.5 MG/3ML) 0.083% neb solution     azaTHIOprine (IMURAN) 50 MG tablet     budesonide (PULMICORT) 0.5 MG/2ML neb solution     calcium acetate (PHOSLO) 667 MG CAPS capsule     Magnesium Cl-Calcium Carbonate (SLOW-MAG) 71.5-119 MG TBEC     metoprolol tartrate (LOPRESSOR) 25 MG tablet     multivitamin RENAL (RENAVITE RX/NEPHROVITE) 1 MG tablet     pantoprazole (PROTONIX) 40 MG EC tablet      "posaconazole (NOXAFIL) 100 MG EC tablet     predniSONE (DELTASONE) 2.5 MG tablet     predniSONE (DELTASONE) 5 MG tablet     sulfamethoxazole-trimethoprim (BACTRIM) 400-80 MG tablet     tacrolimus (GENERIC EQUIVALENT) 0.5 MG capsule     tacrolimus (GENERIC EQUIVALENT) 1 MG capsule     No current facility-administered medications for this visit.            Physical Exam:   BP (!) 155/87 (BP Location: Right arm, Patient Position: Chair, Cuff Size: Adult Regular)   Pulse 105   Ht 1.6 m (5' 3\")   Wt 54.8 kg (120 lb 11.5 oz)   LMP 06/07/2014 (Exact Date)   SpO2 100%   BMI 21.38 kg/m      GENERAL: alert, NAD  HEENT: NCAT, EOMI, no scleral icterus, oral mucosa moist and without lesions  Neck: no cervical or supraclavicular adenopathy  Lungs: moderate airflow, scattered fine crackles, increased in left base, sonorous expirations  CV: RRR, S1S2, no murmurs noted  Abdomen: normoactive BS, soft, non tender   Lymph:trace BLE edema  Neuro: AAO X 3, CN 2-12 grossly intact  Psychiatric: normal affect, good eye contact  Skin: no rash, jaundice or lesions on limited exam  MSK: left knee in brace         Data:   All laboratory and imaging data reviewed.      Recent Results (from the past 168 hour(s))   Basic metabolic panel    Collection Time: 10/12/21 12:51 PM   Result Value Ref Range    Sodium 135 133 - 144 mmol/L    Potassium 4.0 3.4 - 5.3 mmol/L    Chloride 102 94 - 109 mmol/L    Carbon Dioxide (CO2) 29 20 - 32 mmol/L    Anion Gap 4 3 - 14 mmol/L    Urea Nitrogen 31 (H) 7 - 30 mg/dL    Creatinine 3.85 (H) 0.52 - 1.04 mg/dL    Calcium 8.5 8.5 - 10.1 mg/dL    Glucose 108 (H) 70 - 99 mg/dL    GFR Estimate 12 (L) >60 mL/min/1.73m2   Magnesium    Collection Time: 10/12/21 12:51 PM   Result Value Ref Range    Magnesium 1.8 1.6 - 2.3 mg/dL   Tacrolimus level    Collection Time: 10/12/21 12:51 PM   Result Value Ref Range    Tacrolimus by Tandem Mass Spectrometry 7.6 5.0 - 15.0 ug/L    Tacrolimus Last Dose Date 10/12/2021     " Tacrolimus Last Dose Time 12:00 AM    Hepatic panel    Collection Time: 10/12/21 12:51 PM   Result Value Ref Range    Bilirubin Total 0.8 0.2 - 1.3 mg/dL    Bilirubin Direct 0.4 (H) 0.0 - 0.2 mg/dL    Protein Total 7.8 6.8 - 8.8 g/dL    Albumin 2.8 (L) 3.4 - 5.0 g/dL    Alkaline Phosphatase 316 (H) 40 - 150 U/L    AST 24 0 - 45 U/L    ALT 52 (H) 0 - 50 U/L   General PFT Lab (Please always keep checked)    Collection Time: 10/12/21  1:23 PM   Result Value Ref Range    FVC-Pred 3.23 L    FVC-Pre 1.54 L    FVC-%Pred-Pre 47 %    FEV1-Pre 1.22 L    FEV1-%Pred-Pre 47 %    FEV1FVC-Pred 80 %    FEV1FVC-Pre 79 %    FEFMax-Pred 6.40 L/sec    FEFMax-Pre 3.61 L/sec    FEFMax-%Pred-Pre 56 %    FEF2575-Pred 2.36 L/sec    FEF2575-Pre 1.00 L/sec    HHM4735-%Pred-Pre 42 %    ExpTime-Pre 7.30 sec    FIFMax-Pre 2.65 L/sec    FEV1FEV6-Pred 81 %    FEV1FEV6-Pre 78 %     PFT interpretation:  Maneuver: valid and met ATS guidelines  Normal ratio with decreased FEV1 and FVC  Compared to prior: FEV1 of 1.22 is 70 ml below prior  Decrease in FVC is likely related to restriction; lung volumes would be necessary to determine      Again, thank you for allowing me to participate in the care of your patient.        Sincerely,        Ame Chow PA-C

## 2021-10-13 LAB
CMV DNA SPEC NAA+PROBE-ACNC: NOT DETECTED IU/ML
EBV DNA COPIES/ML, INSTRUMENT: ABNORMAL COPIES/ML
EBV DNA SPEC NAA+PROBE-LOG#: 6 {LOG_COPIES}/ML

## 2021-10-13 NOTE — RESULT ENCOUNTER NOTE
Tacrolimus level 7.6 at 12.75 hours, on 10/12/21.  Goal 8-10.   Current dose 0.5 mg in AM, 0.5 mg in PM    Level close to goal.  No dose change.    [employer] message sent

## 2021-10-15 ENCOUNTER — VIRTUAL VISIT (OUTPATIENT)
Dept: INFECTIOUS DISEASES | Facility: CLINIC | Age: 61
End: 2021-10-15
Attending: STUDENT IN AN ORGANIZED HEALTH CARE EDUCATION/TRAINING PROGRAM
Payer: MEDICARE

## 2021-10-15 VITALS — SYSTOLIC BLOOD PRESSURE: 159 MMHG | DIASTOLIC BLOOD PRESSURE: 96 MMHG

## 2021-10-15 DIAGNOSIS — N18.6 ESRD (END STAGE RENAL DISEASE) (H): ICD-10-CM

## 2021-10-15 DIAGNOSIS — B27.00 EBV (EPSTEIN-BARR VIRUS) VIREMIA: ICD-10-CM

## 2021-10-15 DIAGNOSIS — B59 PNEUMONIA DUE TO PNEUMOCYSTIS JIROVECII, UNSPECIFIED LATERALITY, UNSPECIFIED PART OF LUNG (H): ICD-10-CM

## 2021-10-15 DIAGNOSIS — Z23 NEED FOR VACCINATION: ICD-10-CM

## 2021-10-15 DIAGNOSIS — Z94.2 LUNG TRANSPLANT STATUS, BILATERAL (H): ICD-10-CM

## 2021-10-15 DIAGNOSIS — Z79.2 ADMINISTRATION OF LONG-TERM PROPHYLACTIC ANTIBIOTICS: ICD-10-CM

## 2021-10-15 DIAGNOSIS — B44.89 INFECTION BY ASPERGILLUS FUMIGATUS (H): Primary | ICD-10-CM

## 2021-10-15 DIAGNOSIS — K74.00 LIVER FIBROSIS: ICD-10-CM

## 2021-10-15 PROCEDURE — 99215 OFFICE O/P EST HI 40 MIN: CPT | Mod: 95 | Performed by: STUDENT IN AN ORGANIZED HEALTH CARE EDUCATION/TRAINING PROGRAM

## 2021-10-15 PROCEDURE — 99417 PROLNG OP E/M EACH 15 MIN: CPT | Mod: 95 | Performed by: STUDENT IN AN ORGANIZED HEALTH CARE EDUCATION/TRAINING PROGRAM

## 2021-10-15 ASSESSMENT — PAIN SCALES - GENERAL: PAINLEVEL: NO PAIN (0)

## 2021-10-15 NOTE — PROGRESS NOTES
Kecia is a 60 year old who is being evaluated via a billable video visit.      How would you like to obtain your AVS? MyChart  If the video visit is dropped, the invitation should be resent by: Text to cell phone: [phone]  Will anyone else be joining your video visit? No      Video Start Time: 3.07 pm  Video-Visit Details    Type of service:  Video Visit    Video End Time:3:42 PM    Originating Location (pt. Location): Home    Distant Location (provider location):  [facility] [place]     Platform used for Video Visit: [facility] patient. Total time including chart review, care-coordination and documentation time on the date of encounter - 97 mins    _______________________________________________________________________________________________________  [facility]  Transplant Infectious Disease Progress Note     Patient:  Kecia Blue, Date of birth 1960, Medical record number [id number]  Date of Visit:  10/15/2021            Assessment and Recommendations:   Recommendations:  --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 This will most likely be for life   - periodic posaconazole trough levels - every 3-6 months   --Cont bactrim for PCP secondary prophylaxis.This will be for life  - Check EBV VL monthly until it returns to baseline after which it can be checked every 6 months . I suspect it will normalize to her baseline soon and suspect the elevation is related to health stress.   - Pursuing a kidney transplant will need inputs from the lung transplant, kidney transplant, hepatology, transplant surgery, transplant ID teams and patient (regarding how much risk she is willing to take) since she is at risk for worsening aspergillus infection (although we dont know how much of risk this will be) with heightened immune suppression soon after kidney transplant.   - recommend flu vaccine, otherwise up to date  with vaccinations.     Communicated with lung transplant team     Return visit 3 months       Problem List/Assessement:  1. S/p lung transplant 3/1/2018 (CMV D+/R+, EBV D+/R+) on prednisone 7.5 mg daily, imuran 25 mg daily and tacrolimus (level 6-12)               -Post transplant course has been complicated by right mainstem bronchial stenosis treated with serial dilations   4. ESRD on HD  5. 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 Severe PCP pneumonia/AHRF Jan 2021  7. Left Aspergillus empyema  8. Prior hx of CMV viremia  9.  EBV viremia     Left aspergillus empyema:  see history below. s/p drainage and vori (S to both vori and posa) in 2019, Calcified mass in left pleural cavity s/p biopsy Oct 2020 with aspergillus on cx and path (S to both vori and posa) vori changed to posaconazole. S/p left pleural effusion drainage April 2021 - exudative, fungal elements seen on KOH but fungal cx neg. Posa levels good and no recurrence of pleural effusion in Sep 2021 CT chest.     I suspect the left pleural effusion is reactive to the calcified aspergillus elements there along with fluid disturbance related to dialysis. I do not think this is failure of anti-fungal therapy or recurrence of Aspergillus infection. It seems Dr. Layton has had discussions with surgery for source control (removal of calcified mass), however, there is  concern for high morbidity with surgery and therefore it was decided to pursue long term posaconazole.     EBV viremia: recent elevation to 960k. Suspect due to recent hospitalization, health stress.     Candidacy for kidney transplant: This is a very difficult call. She is at risk for aspergillus infection worsening with heightened immune suppression soon after kidney transplant but its hard to know how much of a risk this will be. Also people with liver disease have  worse fungal infection severity. Hence pursuing a kidney transplant will need inputs from the lung transplant, kidney transplant, liver team, transplant surgery, transplant ID and patient (regarding how much risk she is willing to take) to make a decision regarding this.               Interval History:   Transplants:  3/1/2018 (Lung).  Coordinator Mikayla Latham     This is a follow up after hospital discharge. First time seeing patient. Followed by my colleague Dr. Layton as an outpatient  HPI:     Patient is a 58 yo female with PMHx significant for ILD, seronegative rheumatoid arthritis and dermatomyositis s/p B/L lung transplantation 3/1/2018 (CMV D+/R+, EBV D+/R+). 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 Underwent CT guided biopsy on 10/8/20 which yielded purulent fluid--cx positive for aspergillus fumigatus. She underwent bronchoscopy with BAL on 10/11/20 which showed septate hyphae.  Notably, she had also grown Actinomyces from multiple BAL specimens in the past. She was started on voriconazole at the time.      Patient subsequently underwent repaet CT chest on 7/18/20 at OSH and was noted to have interval increased hypodense mass like region in medial aspect of left lower lung. As a result, patient underwent CT guided lung tissue biopsy. Histopath showed acute inflammation, necrosis and myofibroblastic proliferation with focal fungal hyphae highlighted by GMS.  Cultures positive for Aspergillus fumigatus. Voriconazole was changed to posaconazole at that  time     1/5/21- BAL Cx with stenotrophomonas maltophilia-treated with ceftazidime x 2 weeks     1/24/21-2/25/21 hospitalized with AHRF requiring intubation 2/2 PCP pneumonia/ARDS(bactrim prophylaxis had been stopped due to side effects) with subsequent trach on 2/12/21. She was discharged to the acute rehab and subsequently went home on 3/5/21.     In April 2021 she was found to have a left pleural effusion and hence had it drained with a chest tube x 1 week- ampho beads were placed. Pleurodesis was planned but abandoned due to fungal elements on stain. Fluid was exudative, fungal elements were seen but fungal cx was negative including other cxs. Serum Asp GM qas neg, serum BDG continued to be positive. She had a follow up CT chest in Sep which did not show a recurrence of the left pleural effusion.     She continues on posaconazole 300 mg daily and levels have been good and she is tolerating it well. She has been on dialysis for the past 2 years and is wondering about a kidney transplant.     She also has a history of liver dysfunction with alk phos elevation, ascites and portal HTN, followed by hepatology, suggestive of congestive hepatopathy with moderate zone 3 pericellular fibrosis. The ascites improved with dialysis. She reports that it has been a long time since she had a paracentesis.     Recent admission in Sep 2021 for most likely fluid overload vs HCAP. Oct 2021 EBV elevated to 969k from 45k in Sep 2021.     Results for SARAI KILILAN (MRN [id number]) as of 10/15/2021 21:46   Ref. Range 9/15/2021 09:15 10/12/2021 12:51   EBV DNA Copies/mL Latest Ref Range: <=0 copies/mL 45,122 (H) 969,411 (H)     Results for SARAI KILLIAN (MRN [id number]) as of 10/15/2021 21:46   Ref. Range 12/9/2020 07:21 1/25/2021 12:59 2/22/2021 05:27 5/1/2021 06:54   EBV DNA Copies/mL Latest Ref Range: EBVNEG^EBV DNA Not Detected Copies/mL 10,686 (A) 5,619 (A) 75,709 (A) 38,186 (A)              Current Medications &  Allergies:   Reviewed    No Known Allergies         Physical Exam:     Unable to examine due to virtual visit          Laboratory Data:     Inflammatory Markers    Recent Labs   Lab Test 10/07/19  1221 10/06/19  1444 09/13/19  0934 09/11/19  2325 08/22/19  0820   SED 93*  --  111* 102*  --    CRP  --  25.0* 58.0* 66.0* 47.0*       Immune Globulin Studies     Recent Labs   Lab Test 09/15/21  0915 01/31/21  0441 01/25/21  0406 10/27/19  0621 03/01/18  0356 02/19/18  0759   IGG 1,198 763  --  936 698 1,790*   IGM  --   --   --   --   --  502*   IGE  --   --  <2  --   --  <2   IGA  --   --   --   --   --  425*   IGG1  --   --   --   --   --  1,300*   IGG2  --   --   --   --   --  131*   IGG3  --   --   --   --   --  101   IGG4  --   --   --   --   --  1*       Metabolic Studies       Recent Labs   Lab Test 10/12/21  1251 09/23/21  1144 09/23/21  0628 09/23/21  0628 09/22/21  1008 09/22/21  1008 09/20/21  2128 09/20/21  1903 09/16/21  0749 09/15/21  0915 05/05/21  0707 05/03/21  0640 10/22/19  1314 10/21/19  1752 08/13/19  0000 08/07/19  0959     --   --  134   < > 134   < > 132*  --  136   < > 132*   < > 133   < > 133   POTASSIUM 4.0  --   --  4.0   < > 3.5   < > 5.3   < > 4.8   < > 5.5*   < > 5.0   < > 4.2   CHLORIDE 102  --   --  99   < > 103   < > 101  --  98   < > 100   < > 109   < > 98   CO2 29  --   --  26   < > 22   < > 23  --  26   < > 23   < > 10*   < > 29   ANIONGAP 4  --    < > 9   < > 9   < > 8  --  12   < > 9   < > 13   < > 6   BUN 31*  --    < > 28   < > 51*   < > 90*  --  79*   < > 85*   < > 66*   < > 27   CR 3.85*  --   --  3.73*   < > 4.46*   < > 5.85*  --  5.63*   < > 4.55*   < > 5.76*   < > 1.80*   GFRESTIMATED 12*  --    < > 13*   < > 10*   < > 7*  --  8*   < > 10*   < > 8*   < > 31*   *  --    < > 146*   < > 192*   < > 98   < > 152*   < > 129*   < > 138*   < > 119*   A1C  --   --   --   --   --   --   --   --   --  5.8*  --   --    < >  --   --   --    CHELSEY 8.5  --   --  8.2*   < > 8.0*    < > 8.5  --  8.6   < > 7.8*   < > 8.0*   < > 9.8   PHOS  --   --   --   --   --   --   --   --   --   --   --  5.9*   < > 6.7*   < >  --    MAG 1.8  --   --   --   --   --   --   --   --   --   --   --    < > 2.0   < > 2.2   LACT  --  1.5  --   --   --  0.7   < >  --   --   --   --   --    < >  --    < >  --    PCAL  --   --   --   --   --   --   --  0.21  --   --   --   --    < >  --    < >  --    FGTL  --   --   --   --   --   --   --   --   --  210  --   --    < >  --    < >  --    CKT  --   --   --   --   --   --   --   --   --   --   --   --   --  70  --  36    < > = values in this interval not displayed.       Hepatic Studies    Recent Labs   Lab Test 10/12/21  1251 09/21/21  1055 09/20/21  1903 09/20/21  1903 09/15/21  0915 05/01/21  0654 01/27/21  0414 01/26/21  0425   BILITOTAL 0.8 1.2  --  1.1   < >  --    < > 0.8   DBIL 0.4*  --   --   --    < >  --    < > 0.6*   ALKPHOS 316* 324*  --  321*   < >  --    < > 184*   PROTTOTAL 7.8 6.7*   < > 7.4   < > 7.2   < > 6.0*  6.0*   ALBUMIN 2.8* 2.6*  --  3.0*   < >  --    < > 2.0*   AST 24 22  --  22   < >  --    < > 11   ALT 52* 80*  --  98*   < >  --    < > 30   LDH  --   --   --   --   --  164  --  187    < > = values in this interval not displayed.     Hematology Studies      Recent Labs   Lab Test 09/23/21  0628 09/22/21  0610 09/22/21  0610 09/21/21  1055 09/21/21  1055 09/20/21  1903 09/20/21  1903 09/15/21  0915 09/15/21  0915 05/02/21  0715 04/29/21  1020 04/08/21  0722 02/27/21  0853 02/25/21  0542   WBC 13.9*  --  12.2*  --  11.5*  --  9.9  --  8.3 7.5   < > 6.3   < > 2.9*   ANEU  --   --   --   --   --   --   --   --   --   --   --  4.8  --  1.9   ALYM  --   --   --   --   --   --   --   --   --   --   --  0.7*  --  0.6*   SHERRI  --   --   --   --   --   --   --   --   --   --   --  0.8  --  0.4   AEOS  --   --   --   --   --   --   --   --   --   --   --  0.1  --  0.1   HGB 10.4*   < > 10.7*   < > 10.3*   < > 11.3*   < > 11.0* 10.6*   < > 9.7*   < >  9.9*   HCT 33.0*   < > 33.3*   < > 31.9*   < > 36.1   < > 35.1 33.8*   < > 29.9*   < > 32.0*      < > 200   < > 218   < > 228   < > 213 214   < > 178   < > 293    < > = values in this interval not displayed.       posaconazole level ok 9/2021    Microbiology:  CMV 10/12/21 neg    EBV DNA Copies/mL   Date Value Status   02/22/2021 75,709 (A) Final   01/25/2021 5,619 (A) Final   12/09/2020 10,686 (A) Final   09/09/2020 2,935 (A) Final   03/09/2020 8,918 (A) Final   02/19/2020 38,419 (A) Final     5/2021 pleural fluid KOH - pos fungal elements, fungal cx neg    10/2019 pleural fluid fungal cx   Aspergillus fumigatus    E-TEST   Amphotericin B 0.5 ug/mL No interpretation available    Comment: See comment... 1     Itraconazole 0.12 ug/mL No interpretation available    Micafungin <=0.06 ug/mL No interpretation available    Posaconazole <=0.06 ug/ml No interpretation available    Voriconazole 0.5 ug/mL No interpretation available      Lung tissue Fungal cx aug 2020    Aspergillus fumigatus     FUNGAL YEAST JAYME     Amphotericin B 1.0 ug/mL No interpretation available     Comment: See comment... 1      Itraconazole 0.25 ug/mL No interpretation available     Micafungin <=0.03 ug/mL No interpretation available     Posaconazole 0.12 ug/ml No interpretation available     Voriconazole 0.5 ug/mL No interpretation available      Imaging: personally reviewed     CT chest 9/15/21  IMPRESSION: Small old partially calcified empyema paravertebral left  lower lung. Removal of previous thoracostomy tube. Unchanged  osteopenic T5 compression deformity. Bilateral lung transplantation.  Increased opacities in right lower lung which may indicate worsening  inflammation rather than edema. Continued right-sided bronchial  anastomotic narrowing, unchanged from just over 4 months ago.  Cholelithiasis. Aortic atherosclerosis.    CT chest 5/4/21  1. Decreased size of the peripherally calcified chronic empyema within  the medial left lower  lobe with left sided chest tube in place.  Retained amphotericin bead is within the collection.  2. Cardiomegaly with basilar predominance groundglass opacities and  interlobular septal thickening, compatible with pulmonary edema.  3. Postsurgical changes of bilateral lung transplant with unchanged  mild right bronchial anastomotic narrowing.    CT chest 4/8/21  1. Stable partially calcified pleural cystic structure/collection  along the medial right lower lobe, consistent with chronic  empyema/sequela of prior infection. Trace bilateral pleural effusions.  2. Cardiomegaly with basilar predominant patchy groundglass opacities  with diffuse interlobular septal thickening, consistent with pulmonary  edema.  3. Tree-in-bud nodular opacities predominantly involving the lingula,  concerning for infection.  4. Stable postsurgical changes of bilateral lung transplantation.  5. Cholelithiasis.  6. Splenomegaly.

## 2021-10-15 NOTE — LETTER
10/15/2021       RE: Kecia Blue  [address] [place] [address]     Dear Colleague,    Thank you for referring your patient, Kecia Blue, to the [facility] INFECTI[facility] DISEASE [facility] [place] at [facility]. Please see a copy of my visit note below.    Kecia is a 60 year old who is being evaluated via a billable video visit.      How would you like to obtain your AVS? MyChart  If the video visit is dropped, the invitation should be resent by: Text to cell phone: [phone]  Will anyone else be joining your video visit? No      Video Start Time: 3.07 pm  Video-Visit Details    Type of service:  Video Visit    Video End Time:3:42 PM    Originating Location (pt. Location): Home    Distant Location (provider location):  [facility] [place]     Platform [facility] for Video Visit: [facility]     Complex patient. Total time including chart review, care-coordination and documentation time on the date of encounter - 97 mins    _______________________________________________________________________________________________________  [facility]  Transplant Infectious Disease Progress Note     Patient:  Kecia Blue, Date of birth 1960, Medical record number [id number]  Date of Visit:  10/15/2021            Assessment and Recommendations:   Recommendations:  --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 This will most likely be for life   - periodic posaconazole trough levels - every 3-6 months   --Cont bactrim for PCP secondary prophylaxis.This will be for life  - Check EBV VL monthly until it returns to baseline after which it can be checked every 6 months . I suspect it will normalize to her baseline soon and suspect the elevation is related to health stress.   - Pursuing a kidney transplant will need inputs from the lung transplant, kidney transplant,  hepatology, transplant surgery, transplant ID teams and patient (regarding how much risk she is willing to take) since she is at risk for worsening aspergillus infection (although we dont know how much of risk this will be) with heightened immune suppression soon after kidney transplant.   - recommend flu vaccine, otherwise up to date with vaccinations.     Communicated with lung transplant team     Return visit 3 months       Problem List/Assessement:  1. S/p lung transplant 3/1/2018 (CMV D+/R+, EBV D+/R+) on prednisone 7.5 mg daily, imuran 25 mg daily and tacrolimus (level 6-12)               -Post transplant course has been complicated by right mainstem bronchial stenosis treated with serial dilations   4. ESRD on HD  5. 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 Severe PCP pneumonia/AHRF Jan 2021  7. Left Aspergillus empyema  8. Prior hx of CMV viremia  9.  EBV viremia     Left aspergillus empyema:  see history below. s/p drainage and vori (S to both vori and posa) in 2019, Calcified mass in left pleural cavity s/p biopsy Oct 2020 with aspergillus on cx and path (S to both vori and posa) vori changed to posaconazole. S/p left pleural effusion drainage April 2021 - exudative, fungal elements seen on KOH but fungal cx neg. Posa levels good and no recurrence of pleural effusion in Sep 2021 CT chest.     I suspect the left pleural effusion is reactive to the calcified aspergillus elements there along with fluid disturbance related to dialysis. I do not think this is failure of anti-fungal therapy or recurrence of Aspergillus infection. It seems Dr. Layton has had discussions with surgery for source control (removal of calcified mass), however, there is  concern for high morbidity with surgery and therefore it was decided to pursue long term posaconazole.     EBV viremia: recent elevation to  960k. Suspect due to recent hospitalization, health stress.     Candidacy for kidney transplant: This is a very difficult call. She is at risk for aspergillus infection worsening with heightened immune suppression soon after kidney transplant but its hard to know how much of a risk this will be. Also people with liver disease have worse fungal infection severity. Hence pursuing a kidney transplant will need inputs from the lung transplant, kidney transplant, liver team, transplant surgery, transplant ID and patient (regarding how much risk she is willing to take) to make a decision regarding this.               Interval History:   Transplants:  3/1/2018 (Lung).  Coordinator Mikayla Latham     This is a follow up after hospital discharge. First time seeing patient. Followed by my colleague Dr. Layton as an outpatient  HPI:     Patient is a 58 yo female with PMHx significant for ILD, seronegative rheumatoid arthritis and dermatomyositis s/p B/L lung transplantation 3/1/2018 (CMV D+/R+, EBV D+/R+). 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 Underwent CT guided biopsy on 10/8/20 which yielded purulent fluid--cx positive for aspergillus fumigatus. She underwent bronchoscopy with BAL on 10/11/20 which showed septate hyphae.  Notably, she had also grown Actinomyces from multiple BAL specimens in the past. She was started on voriconazole at the time.      Patient subsequently underwent repaet CT chest on 7/18/20 at OSH and was noted to have interval increased hypodense mass like  region in medial aspect of left lower lung. As a result, patient underwent CT guided lung tissue biopsy. Histopath showed acute inflammation, necrosis and myofibroblastic proliferation with focal fungal hyphae highlighted by GMS.  Cultures positive for Aspergillus fumigatus. Voriconazole was changed to posaconazole at that time     1/5/21- BAL Cx with stenotrophomonas maltophilia-treated with ceftazidime x 2 weeks     1/24/21-2/25/21 hospitalized with AHRF requiring intubation 2/2 PCP pneumonia/ARDS(bactrim prophylaxis had been stopped due to side effects) with subsequent trach on 2/12/21. She was discharged to the acute rehab and subsequently went home on 3/5/21.     In April 2021 she was found to have a left pleural effusion and hence had it drained with a chest tube x 1 week- ampho beads were placed. Pleurodesis was planned but abandoned due to fungal elements on stain. Fluid was exudative, fungal elements were seen but fungal cx was negative including other cxs. Serum Asp GM qas neg, serum BDG continued to be positive. She had a follow up CT chest in Sep which did not show a recurrence of the left pleural effusion.     She continues on posaconazole 300 mg daily and levels have been good and she is tolerating it well. She has been on dialysis for the past 2 years and is wondering about a kidney transplant.     She also has a history of liver dysfunction with alk phos elevation, ascites and portal HTN, followed by hepatology, suggestive of congestive hepatopathy with moderate zone 3 pericellular fibrosis. The ascites improved with dialysis. She reports that it has been a long time since she had a paracentesis.     Recent admission in Sep 2021 for most likely fluid overload vs HCAP. Oct 2021 EBV elevated to 969k from 45k in Sep 2021.     Results for ARMANDOTIARRA SARAI R (MRN [id number]) as of 10/15/2021 21:46   Ref. Range 9/15/2021 09:15 10/12/2021 12:51   EBV DNA Copies/mL Latest Ref Range: <=0 copies/mL 45,122 (H)  969,411 (H)     Results for SARAI KILLIAN (MRN [id number]) as of 10/15/2021 21:46   Ref. Range 12/9/2020 07:21 1/25/2021 12:59 2/22/2021 05:27 5/1/2021 06:54   EBV DNA Copies/mL Latest Ref Range: EBVNEG^EBV DNA Not Detected Copies/mL 10,686 (A) 5,619 (A) 75,709 (A) 38,186 (A)              Current Medications & Allergies:   Reviewed    No Known Allergies         Physical Exam:     Unable to examine due to virtual visit          Laboratory Data:     Inflammatory Markers    Recent Labs   Lab Test 10/07/19  1221 10/06/19  1444 09/13/19  0934 09/11/19  2325 08/22/19  0820   SED 93*  --  111* 102*  --    CRP  --  25.0* 58.0* 66.0* 47.0*       Immune Globulin Studies     Recent Labs   Lab Test 09/15/21  0915 01/31/21  0441 01/25/21  0406 10/27/19  0621 03/01/18  0356 02/19/18  0759   IGG 1,198 763  --  936 698 1,790*   IGM  --   --   --   --   --  502*   IGE  --   --  <2  --   --  <2   IGA  --   --   --   --   --  425*   IGG1  --   --   --   --   --  1,300*   IGG2  --   --   --   --   --  131*   IGG3  --   --   --   --   --  101   IGG4  --   --   --   --   --  1*       Metabolic Studies       Recent Labs   Lab Test 10/12/21  1251 09/23/21  1144 09/23/21  0628 09/23/21  0628 09/22/21  1008 09/22/21  1008 09/20/21  2128 09/20/21  1903 09/16/21  0749 09/15/21  0915 05/05/21  0707 05/03/21  0640 10/22/19  1314 10/21/19  1752 08/13/19  0000 08/07/19  0959     --   --  134   < > 134   < > 132*  --  136   < > 132*   < > 133   < > 133   POTASSIUM 4.0  --   --  4.0   < > 3.5   < > 5.3   < > 4.8   < > 5.5*   < > 5.0   < > 4.2   CHLORIDE 102  --   --  99   < > 103   < > 101  --  98   < > 100   < > 109   < > 98   CO2 29  --   --  26   < > 22   < > 23  --  26   < > 23   < > 10*   < > 29   ANIONGAP 4  --    < > 9   < > 9   < > 8  --  12   < > 9   < > 13   < > 6   BUN 31*  --    < > 28   < > 51*   < > 90*  --  79*   < > 85*   < > 66*   < > 27   CR 3.85*  --   --  3.73*   < > 4.46*   < > 5.85*  --  5.63*   < > 4.55*   < > 5.76*    < > 1.80*   GFRESTIMATED 12*  --    < > 13*   < > 10*   < > 7*  --  8*   < > 10*   < > 8*   < > 31*   *  --    < > 146*   < > 192*   < > 98   < > 152*   < > 129*   < > 138*   < > 119*   A1C  --   --   --   --   --   --   --   --   --  5.8*  --   --    < >  --   --   --    CHELSEY 8.5  --   --  8.2*   < > 8.0*   < > 8.5  --  8.6   < > 7.8*   < > 8.0*   < > 9.8   PHOS  --   --   --   --   --   --   --   --   --   --   --  5.9*   < > 6.7*   < >  --    MAG 1.8  --   --   --   --   --   --   --   --   --   --   --    < > 2.0   < > 2.2   LACT  --  1.5  --   --   --  0.7   < >  --   --   --   --   --    < >  --    < >  --    PCAL  --   --   --   --   --   --   --  0.21  --   --   --   --    < >  --    < >  --    FGTL  --   --   --   --   --   --   --   --   --  210  --   --    < >  --    < >  --    CKT  --   --   --   --   --   --   --   --   --   --   --   --   --  70  --  36    < > = values in this interval not displayed.       Hepatic Studies    Recent Labs   Lab Test 10/12/21  1251 09/21/21  1055 09/20/21  1903 09/20/21  1903 09/15/21  0915 05/01/21  0654 01/27/21  0414 01/26/21  0425   BILITOTAL 0.8 1.2  --  1.1   < >  --    < > 0.8   DBIL 0.4*  --   --   --    < >  --    < > 0.6*   ALKPHOS 316* 324*  --  321*   < >  --    < > 184*   PROTTOTAL 7.8 6.7*   < > 7.4   < > 7.2   < > 6.0*  6.0*   ALBUMIN 2.8* 2.6*  --  3.0*   < >  --    < > 2.0*   AST 24 22  --  22   < >  --    < > 11   ALT 52* 80*  --  98*   < >  --    < > 30   LDH  --   --   --   --   --  164  --  187    < > = values in this interval not displayed.     Hematology Studies      Recent Labs   Lab Test 09/23/21  0628 09/22/21  0610 09/22/21  0610 09/21/21  1055 09/21/21  1055 09/20/21  1903 09/20/21  1903 09/15/21  0915 09/15/21  0915 05/02/21  0715 04/29/21  1020 04/08/21  0722 02/27/21  0853 02/25/21  0542   WBC 13.9*  --  12.2*  --  11.5*  --  9.9  --  8.3 7.5   < > 6.3   < > 2.9*   ANEU  --   --   --   --   --   --   --   --   --   --   --  4.8  --   1.9   ALYM  --   --   --   --   --   --   --   --   --   --   --  0.7*  --  0.6*   SHERRI  --   --   --   --   --   --   --   --   --   --   --  0.8  --  0.4   AEOS  --   --   --   --   --   --   --   --   --   --   --  0.1  --  0.1   HGB 10.4*   < > 10.7*   < > 10.3*   < > 11.3*   < > 11.0* 10.6*   < > 9.7*   < > 9.9*   HCT 33.0*   < > 33.3*   < > 31.9*   < > 36.1   < > 35.1 33.8*   < > 29.9*   < > 32.0*      < > 200   < > 218   < > 228   < > 213 214   < > 178   < > 293    < > = values in this interval not displayed.       posaconazole level ok 9/2021    Microbiology:  CMV 10/12/21 neg    EBV DNA Copies/mL   Date Value Status   02/22/2021 75,709 (A) Final   01/25/2021 5,619 (A) Final   12/09/2020 10,686 (A) Final   09/09/2020 2,935 (A) Final   03/09/2020 8,918 (A) Final   02/19/2020 38,419 (A) Final     5/2021 pleural fluid KOH - pos fungal elements, fungal cx neg    10/2019 pleural fluid fungal cx   Aspergillus fumigatus    E-TEST   Amphotericin B 0.5 ug/mL No interpretation available    Comment: See comment... 1     Itraconazole 0.12 ug/mL No interpretation available    Micafungin <=0.06 ug/mL No interpretation available    Posaconazole <=0.06 ug/ml No interpretation available    Voriconazole 0.5 ug/mL No interpretation available      Lung tissue Fungal cx aug 2020    Aspergillus fumigatus     FUNGAL YEAST JAYME     Amphotericin B 1.0 ug/mL No interpretation available     Comment: See comment... 1      Itraconazole 0.25 ug/mL No interpretation available     Micafungin <=0.03 ug/mL No interpretation available     Posaconazole 0.12 ug/ml No interpretation available     Voriconazole 0.5 ug/mL No interpretation available      Imaging: personally reviewed     CT chest 9/15/21  IMPRESSION: Small old partially calcified empyema paravertebral left  lower lung. Removal of previous thoracostomy tube. Unchanged  osteopenic T5 compression deformity. Bilateral lung transplantation.  Increased opacities in right lower lung  which may indicate worsening  inflammation rather than edema. Continued right-sided bronchial  anastomotic narrowing, unchanged from just over 4 months ago.  Cholelithiasis. Aortic atherosclerosis.    CT chest 5/4/21  1. Decreased size of the peripherally calcified chronic empyema within  the medial left lower lobe with left sided chest tube in place.  Retained amphotericin bead is within the collection.  2. Cardiomegaly with basilar predominance groundglass opacities and  interlobular septal thickening, compatible with pulmonary edema.  3. Postsurgical changes of bilateral lung transplant with unchanged  mild right bronchial anastomotic narrowing.    CT chest 4/8/21  1. Stable partially calcified pleural cystic structure/collection  along the medial right lower lobe, consistent with chronic  empyema/sequela of prior infection. Trace bilateral pleural effusions.  2. Cardiomegaly with basilar predominant patchy groundglass opacities  with diffuse interlobular septal thickening, consistent with pulmonary  edema.  3. Tree-in-bud nodular opacities predominantly involving the lingula,  concerning for infection.  4. Stable postsurgical changes of bilateral lung transplantation.  5. Cholelithiasis.  6. Splenomegaly.

## 2021-10-18 DIAGNOSIS — Z94.2 LUNG REPLACED BY TRANSPLANT (H): Primary | ICD-10-CM

## 2021-10-21 DIAGNOSIS — K21.9 GASTROESOPHAGEAL REFLUX DISEASE WITHOUT ESOPHAGITIS: Primary | ICD-10-CM

## 2021-10-21 DIAGNOSIS — Z94.2 LUNG REPLACED BY TRANSPLANT (H): ICD-10-CM

## 2021-10-21 LAB — BACTERIA SPT CULT: NO GROWTH

## 2021-10-21 RX ORDER — PANTOPRAZOLE SODIUM 40 MG/1
40 TABLET, DELAYED RELEASE ORAL
Qty: 30 TABLET | Refills: 3 | Status: SHIPPED | OUTPATIENT
Start: 2021-10-21 | End: 2022-03-01

## 2021-11-08 ENCOUNTER — TELEPHONE (OUTPATIENT)
Dept: PULMONOLOGY | Facility: CLINIC | Age: 61
End: 2021-11-08
Payer: COMMERCIAL

## 2021-11-08 NOTE — TELEPHONE ENCOUNTER
Spoke to patient and she is on a wait list for procedure. Patient will callback with a fax number for covid test.   Lore Avila   [phone]

## 2021-11-10 ENCOUNTER — TELEPHONE (OUTPATIENT)
Dept: TRANSPLANT | Facility: CLINIC | Age: 61
End: 2021-11-10
Payer: COMMERCIAL

## 2021-11-10 ENCOUNTER — LAB (OUTPATIENT)
Dept: LAB | Facility: CLINIC | Age: 61
End: 2021-11-10
Payer: MEDICARE

## 2021-11-10 DIAGNOSIS — Z94.2 S/P LUNG TRANSPLANT (H): ICD-10-CM

## 2021-11-10 PROCEDURE — 80197 ASSAY OF TACROLIMUS: CPT

## 2021-11-10 NOTE — TELEPHONE ENCOUNTER
Reviewed critical labs, elevated BUN and creatinine - patient on dialysis.   Alk phos 309 at patient's baseline.

## 2021-11-10 NOTE — TELEPHONE ENCOUNTER
DATE:  11/10/2021     TIME OF RECEIPT FROM LAB:  9342    ORDERING PROVIDER: Ame Chow PA-C    LAB TEST: Alk Phos 3.09   Creatinine 5.2   BUN 76.8    LAB VALUE:  See Above     RESULTS GIVEN WITH READ-BACK TO (PROVIDER):  Mikayla Latham RN/Gaye Castaneda RN     TIME LAB VALUE REPORTED TO PROVIDER:   3930

## 2021-11-12 ENCOUNTER — TELEPHONE (OUTPATIENT)
Dept: TRANSPLANT | Facility: CLINIC | Age: 61
End: 2021-11-12
Payer: COMMERCIAL

## 2021-11-12 DIAGNOSIS — Z94.2 LUNG REPLACED BY TRANSPLANT (H): ICD-10-CM

## 2021-11-12 LAB
TACROLIMUS BLD-MCNC: 6.7 UG/L (ref 5–15)
TME LAST DOSE: NORMAL H
TME LAST DOSE: NORMAL H

## 2021-11-12 RX ORDER — TACROLIMUS 0.5 MG/1
0.5 CAPSULE ORAL EVERY MORNING
Qty: 30 CAPSULE | Refills: 11 | Status: SHIPPED | OUTPATIENT
Start: 2021-11-12 | End: 2021-12-21

## 2021-11-12 RX ORDER — TACROLIMUS 1 MG/1
1 CAPSULE ORAL EVERY EVENING
Qty: 30 CAPSULE | Refills: 11 | Status: SHIPPED | OUTPATIENT
Start: 2021-11-12 | End: 2021-12-21

## 2021-11-12 NOTE — TELEPHONE ENCOUNTER
Tacrolimus level 6.7 at 12 hours, on 11/10/21.  Goal 8-10.   Current dose 0.5 mg in AM, 0.5 mg in PM    Dose changed to 0.5 mg in AM, 1 mg in PM   Recheck level in 7-14 days.     Discussed with patient.

## 2021-11-15 ENCOUNTER — TELEPHONE (OUTPATIENT)
Dept: TRANSPLANT | Facility: CLINIC | Age: 61
End: 2021-11-15

## 2021-11-15 DIAGNOSIS — Z94.2 S/P LUNG TRANSPLANT (H): ICD-10-CM

## 2021-11-15 RX ORDER — POSACONAZOLE 100 MG/1
300 TABLET, DELAYED RELEASE ORAL DAILY
Qty: 90 TABLET | Refills: 11 | Status: SHIPPED | OUTPATIENT
Start: 2021-11-15 | End: 2022-12-01

## 2021-11-15 NOTE — TELEPHONE ENCOUNTER
Patient Call: Medication Refill  Route to LPN  Instruct the patient to first contact their pharmacy. If they have called their pharmacy and require further assistance, route to LPN.    Pharmacy Name: [facility]  Pharmacy Location: [place]  Name of Medication: posaconazole (NOXAFIL) 100 MG EC tablet  When will the patient be out of this medication?: Greater than 3 days (Route standard priority)

## 2021-11-18 LAB — ACID FAST STAIN (ARUP): NORMAL

## 2021-12-01 ENCOUNTER — TELEPHONE (OUTPATIENT)
Dept: TRANSPLANT | Facility: CLINIC | Age: 61
End: 2021-12-01
Payer: COMMERCIAL

## 2021-12-01 ENCOUNTER — LAB (OUTPATIENT)
Dept: LAB | Facility: CLINIC | Age: 61
End: 2021-12-01
Payer: MEDICARE

## 2021-12-01 PROCEDURE — 80197 ASSAY OF TACROLIMUS: CPT | Performed by: PHYSICIAN ASSISTANT

## 2021-12-01 NOTE — TELEPHONE ENCOUNTER
DATE:  12/1/2021     TIME OF RECEIPT FROM LAB:  1212    ORDERING PROVIDER: Ame Chow PA-C    LAB TEST:  Creatinine 5.6,   Alk Phos 280    RESULTS GIVEN WITH READ-BACK TO (PROVIDER):  Nasreen Francois RNCC covering for EULALIA Townsend     TIME LAB VALUE REPORTED TO PROVIDER:   1227

## 2021-12-02 LAB
TACROLIMUS BLD-MCNC: 8.4 UG/L (ref 5–15)
TME LAST DOSE: NORMAL H
TME LAST DOSE: NORMAL H

## 2021-12-03 DIAGNOSIS — Z94.2 LUNG REPLACED BY TRANSPLANT (H): Primary | ICD-10-CM

## 2021-12-14 ENCOUNTER — TELEPHONE (OUTPATIENT)
Dept: PULMONOLOGY | Facility: CLINIC | Age: 61
End: 2021-12-14
Payer: COMMERCIAL

## 2021-12-14 NOTE — TELEPHONE ENCOUNTER
Spoke with patient to schedule procedure with Dr. Mati Norris   Procedure was scheduled on 01/28 at [facility]  Patient will have H&P with Transplant/pulm team    Patient is aware a COVID-19 test is needed before their procedure. The test should be with-in 4 days of their procedure.   Test Details: Patient will schedule at [facility]    Patient is aware a / is needed day of surgery.   Surgery letter was sent via [employer], patient has my direct contact information for any further questions.

## 2021-12-17 ENCOUNTER — LAB (OUTPATIENT)
Dept: LAB | Facility: CLINIC | Age: 61
End: 2021-12-17
Payer: MEDICARE

## 2021-12-17 ENCOUNTER — TELEPHONE (OUTPATIENT)
Dept: TRANSPLANT | Facility: CLINIC | Age: 61
End: 2021-12-17
Payer: COMMERCIAL

## 2021-12-17 PROCEDURE — 80197 ASSAY OF TACROLIMUS: CPT | Performed by: PHYSICIAN ASSISTANT

## 2021-12-17 NOTE — TELEPHONE ENCOUNTER
DATE:  12/17/2021     TIME OF RECEIPT FROM LAB:  1232    ORDERING PROVIDER:     alk phos =225  cr 5.4  bun 91.2    RESULTS GIVEN WITH READ-BACK TO (PROVIDER):  CHANTE CHANEL    TIME LAB VALUE REPORTED TO PROVIDER:   1239

## 2021-12-20 LAB
TACROLIMUS BLD-MCNC: 11.5 UG/L (ref 5–15)
TME LAST DOSE: NORMAL H
TME LAST DOSE: NORMAL H

## 2021-12-21 ENCOUNTER — TELEPHONE (OUTPATIENT)
Dept: TRANSPLANT | Facility: CLINIC | Age: 61
End: 2021-12-21
Payer: COMMERCIAL

## 2021-12-21 DIAGNOSIS — Z94.2 LUNG REPLACED BY TRANSPLANT (H): ICD-10-CM

## 2021-12-21 RX ORDER — TACROLIMUS 0.5 MG/1
0.5 CAPSULE ORAL 2 TIMES DAILY
Qty: 60 CAPSULE | Refills: 11 | Status: SHIPPED | OUTPATIENT
Start: 2021-12-21 | End: 2022-01-06

## 2021-12-21 NOTE — TELEPHONE ENCOUNTER
Tacrolimus level 11.5 at 12 hours, on 12/17/21.  Goal 8-10.   Current dose 0.5 mg in AM, 1 mg in PM    Dose changed to 0.5 mg in AM, 0.5 mg in PM   Recheck level in 7-14 days.     Nurse to contact patient with dose change and plan to recheck.

## 2021-12-21 NOTE — TELEPHONE ENCOUNTER
Contacted Kecia with Tacro level , dose change and follow up lab.  Tacrolimus level 11.5 at 12 hours, on 12/17/21.  Goal 8-10.   Current dose 0.5 mg in AM, 1 mg in PM     Dose changed to 0.5 mg in AM, 0.5 mg in PM   Recheck level in 7-14 days.      Nurse to contact patient with dose change and plan to recheck.

## 2021-12-28 PROBLEM — B44.9 ASPERGILLUS PNEUMONIA (H): Status: ACTIVE | Noted: 2020-11-20

## 2021-12-29 DIAGNOSIS — Z11.59 ENCOUNTER FOR SCREENING FOR OTHER VIRAL DISEASES: ICD-10-CM

## 2021-12-31 ENCOUNTER — TELEPHONE (OUTPATIENT)
Dept: PULMONOLOGY | Facility: CLINIC | Age: 61
End: 2021-12-31
Payer: COMMERCIAL

## 2021-12-31 NOTE — TELEPHONE ENCOUNTER
PA Initiation    Medication: Posaconazole- PA Pending  Insurance Company:    Pharmacy Filling the Rx: [id number] - [place], [place] - [address] AT [address] & [address]  Filling Pharmacy Phone: [phone]  Filling Pharmacy Fax:    Start Date: 12/31/2021

## 2022-01-03 ENCOUNTER — TELEPHONE (OUTPATIENT)
Dept: TRANSPLANT | Facility: CLINIC | Age: 62
End: 2022-01-03
Payer: MEDICARE

## 2022-01-03 ENCOUNTER — LAB (OUTPATIENT)
Dept: LAB | Facility: CLINIC | Age: 62
End: 2022-01-03
Payer: MEDICARE

## 2022-01-03 DIAGNOSIS — Z94.2 LUNG REPLACED BY TRANSPLANT (H): ICD-10-CM

## 2022-01-03 PROCEDURE — 80197 ASSAY OF TACROLIMUS: CPT

## 2022-01-03 NOTE — PROGRESS NOTES
Pr. Discharged home. IV removed, tele dc'd and returned to monitor tech. F/U instructions provided and discussed. Pt. And family verbalized understanding. Rx is given.  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 Baseline creatinine was 1.8 in Oct 2019 visit   Recent admissions: September 2019- Admitted for left sided chest pain. Had an incidental empyema that was monitored w/o Abx  Oct 6 2019- Admitted with chest pain, aspiration showed aspergillus, dc'ed on Voriconazole     Oliguric RADHA on CKD stage IV  Has had an elevated Cr of 1.2-1.4 since 6/2018. Had seen Nephrology Dr. Gamble on 8/7/19 for elevated creatinine to 1.8. Renal biopsy 8/2019 with arterionephrosclerosis and features concerning for calcineurin toxicity (tacrolimus).   - Of note, patient was started on Voriconazole during recent admission 10/6 for empyema with cx + aspergillus.   - Pt presents with oliguria, elevated Creatinine despite 3 day OP IV fluid bolus challenge (10/18-20).   - CT scan on admission unremarkable for hydronephrosis or obstructing stone.   - Does report decreased PO intake and fluid intake due to abdominal bloating, and CT w/ new onset ascites.  - DDx for RADHA includes  -  medication induced including tacrolimus toxicity in setting of voriconazole,    - Also has a history of worsening HTN over the past 6 months, and was recently started on Imdur, carvedilol, in addition to PTA Amlodipine, therefore, it could be an additional component of relative hypotension  - interstitial nephritis from bactrim (however, this has been chronic medication)  - UTI-very less likely vs pre-renal 2/2 poor oral intake. No s/sx of obstruction.    Plan:  - 5mg IV Bumex given on 10/23 and 10/24 with  minimal UOP  - Tac levels on 10/24 at 6.   - Nephrology, consulted appreciate recommendations  - Renal US w/ doppler to assess for FRANCHESCA- no evidence of stenosis  - Discontinued all of her BP medications, will CTM BP and resume as needed. Permissive hypertension for now  - Even though she is acidotic, holding her bicarb now as the sodium load could be contributing to her ascites, per renal team  - Plan now is to keep her NPO after midnight, if she urinates, then we can continue with IV diuresis. If oliguric will plan for HD catheter placement in AM and a session of HD tomorrow  - Tac restarted per Pulmonary  - IV Ceftriaxone dc'ed as low suspicion for UTI     New onset ascites:   Pt reports 23lb weight gain in 3 days with CT showing e/o moderate ascites/anasarca increased since 9/12/19 and 10/1/19. No hx of cirrhosis. CT w/ mild hepatosplenomegaly. Reports abdominal bloating, early satiety and decreased appetite. No abdominal pain or tenderness, fever or leukocytosis. Do not suspect SBP. DDx includes portal HTN vs other cause which cannot r/o malignancy.   - Diag para+ 2L therpuetic para done on 10/22. No evidence of SBP  - Therapuetic para of 2L done on 10/24 as well  - Abdominal US w/ doppler with no evidence of thrombus  - Her US Liver shows increased echogenicity of liver suggestive of liver disease. However, her US in September 2019 was normal. So, possibly, this is related to an overall volume overload state, will need repeat US once she is euvolemic  - Ascites is curious given this is not a typical symptom for renal volume overload. Possible she has cardiac cirrhosis due  to RHF 2/2 lung disease  - Appreciate hepatology recommendations  - Ascites fluid sent for lymphoma/leukemia w/u     Hx of Bilateral Lung transplant for ILD:   Transplanted in 3/2018; course c/b R main bronchial stenosis requiring repeated dilatation, + DSAs, CMV viremia s/p valganciclovir with undetectable viral loads off therapy. Not on PTA  O2 use. On PTA prednisone 5mg qAM and 2.5mg qPM and tacrolimus 2.5mg qAM and 2.0mg qPM and Bactrim 400-80mg daily for prophylaxis. Currently on room air.   - D/W Dr. Macias, Pulmonology.   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 CT guided bx 10/8 with purulent fluid positive for Aspergillus fumigatus in cultures. ID was consulted during that admission and patient was started on Voriconazole and is continued on Vori 200mg BID. CT surgery was consulted and discussed significant risk of morbidity w/ surgery and recommended tx w/ antifungal and serial CT scan for f/u.  - Continue Voriconazole 200mg BID for now   - Will need a f/u CT chest for her empyema. Will hold for now     Hx of low level CMV viremia:   Peak viral load >3M in 8/2018, was on ganciclovir until 7/25/19. Currently biweekly monitoring of low level CMV viremia. Not detected 10/6/19.   - Transplant Pulmonology consulted      HTN:   On PTA Amlodipine 10mg daily, Carvedilol 25mg BID, and Imdur 120mg daily. Pt reports BP had been increasing and labile over the past 6 months. Was started on Carvedilol, Imdur during previous hospitalization.   - Stop all BP medications to allow higher BPs to help blood flow to kidneys  - Renal US with doppler reviewed, no hydronephrosis     Gallbladder wall thickening with cholelithiasis  Mild wall thickening of cecus, ascending colon, and splenic flexture:   CT w/ Gallbladder wall thickening w/ cholelithasis. Findings may represent reactive changes to ascites. Acute cholecystitis cannot be ruled out.  This  has been evidence on previous imaging studies.  MRI abd 9/12 w/o choledocholithaisis or obstructing mass. No RUQ pain. No WBC or elevated total bili, ALT or AST. Do not have concern for cholecystitis at this time. CT also with mild wall thickening involving cecus, ascending colon, and splenic flexture could be reactive; vs mild colitis is also on ddx per Radiology. On exam, abdominal exam is benign.   - Monitor    Dermatomyositis  Seronegative RA:   No active treatments. Has had increasing bilateral knee pain which has been assessed with OP MRI. Follows with Rheumatology as OP.      Anemia  Thrombocytopenia:   Hgb 9.4 on admission. BL appears 8-9. Plt 134 on admission. Newly thrombocytopenic since 10/6.   - Trend CBC w diff In AM   - Continue ferrous sulfate daily       Diet: Renal Diet (non-dialysis)  NPO for Medical/Clinical Reasons Except for: Meds  Snacks/Supplements Adult: Boost Plus; Between Meals    DVT Prophylaxis: Pneumatic Compression Devices  Basilio Catheter: not present  Code Status: Full Code      Disposition Plan   Expected discharge: 2 - 3 days, recommended to prior living arrangement once adequate pain management/ tolerating PO medications.  Entered: Chris Navarro MD 10/24/2019, 9:16 PM       The patient's care was discussed with the Bedside Nurse.    Chris Navarro MD  Hospitalist Service, [address]  Please see sticky note for cross cover information  ______________________________________________________________________    Interval History   Patient reports worsened abdominal distention  She has otherwise no complaints. Negative for HA, DESHPANDE, NVD, dysuria, hematuria or blood in stool  - She also has some baseline diarrhea    Data reviewed today: I reviewed all medications, new labs and imaging results over the last 24 hours.     Physical Exam   Vital Signs: Temp: 95.5  F (35.3  C) Temp src: Axillary BP: 123/87 Pulse: 84 Heart  Rate: 98 Resp: 16 SpO2: 99 % O2 Device: None (Room air)    Weight: 137 lbs 8 oz  General Appearance: Alert, well appearing, and in no acute distress  Mental Status: Oriented to person, place, and time  HEENT: No pallor or icterus. Pupils equal and reactive, extraocular eye movements intact. Mucous membranes moist, pharynx normal without lesions. Neck supple, no significant adenopathy, or thyromegaly  Chest: Clear to auscultation bilaterally, no wheezes, bilateral inspiratory crackles at the bases, symmetric air entry  Heart: Normal S1, S2. 2/6 murmur in the tricuspid region,, rubs, clicks or gallops. Normal rate, regular rhythm  Abdomen: Soft, nontender, ++ distended, no masses or organomegaly, Bowel sounds heard  Neurological: Alert, oriented, normal speech, no acute focal findings  Skin and Extremities: Peripheral pulses normal, 2+ pedal edema, no clubbing or cyanosis. No rashes, no suspicious skin lesions noted

## 2022-01-03 NOTE — TELEPHONE ENCOUNTER
DATE:  1/3/2022     TIME OF RECEIPT FROM LAB:  1215    LAB TEST:  Alk Phos,Cr,BUN    LAB VALUE:  Alk Phos=210,Cr=5.8,BUN=78    RESULTS GIVEN WITH READ-BACK TO:Mikayla Latham    TIME LAB VALUE REPORTED TO PROVIDER: 1216  Phone #[phone]

## 2022-01-03 NOTE — TELEPHONE ENCOUNTER
Prior Authorization Approval    Authorization Effective Date: 7/14/2022  Authorization Expiration Date: 1/14/2023  Medication: Posaconazole- [place] APPROVED  Approved Dose/Quantity: UD  Reference #:     Insurance Company:    Expected CoPay:       CoPay Card Available:      Foundation Assistance Needed:    Which Pharmacy is filling the prescription (Not needed for infusion/clinic administered): [id number] - [place], [place] - [address] [facility] [address] [facility]  Pharmacy Notified: No  Patient Notified: No

## 2022-01-05 LAB
TACROLIMUS BLD-MCNC: 5.3 UG/L (ref 5–15)
TME LAST DOSE: NORMAL H
TME LAST DOSE: NORMAL H

## 2022-01-06 ENCOUNTER — TELEPHONE (OUTPATIENT)
Dept: TRANSPLANT | Facility: CLINIC | Age: 62
End: 2022-01-06
Payer: MEDICARE

## 2022-01-06 DIAGNOSIS — Z94.2 LUNG REPLACED BY TRANSPLANT (H): ICD-10-CM

## 2022-01-06 RX ORDER — TACROLIMUS 0.5 MG/1
CAPSULE ORAL
Qty: 90 CAPSULE | Refills: 11 | Status: ON HOLD | OUTPATIENT
Start: 2022-01-06 | End: 2022-02-11

## 2022-01-06 NOTE — TELEPHONE ENCOUNTER
Tacrolimus level 5.3 at 12 hours, on 1/3/22.  Goal 8-10.   Current dose 0.5 mg in AM, 0.5 mg in PM    Dose changed to 1 mg in AM, 0.5 mg in PM   Recheck level in 7-14 days.     Discussed with patient.     Will change next appointment to 1/27/22, bronch on 1/28. Order for pre covid test faxed to local lab.

## 2022-01-06 NOTE — LETTER
PHYSICIAN ORDERS      DATE & TIME ISSUED: 2022 4:23 PM  PATIENT NAME: Kecia Blue   : 1960     [facility] MR# [if applicable]: [id number]     DIAGNOSIS:  Lung Transplant  Z94.2    Pt needs pre procedure COVID PCR done on 22 or 22. DOS 22    Any questions please call: Mikayla     Please fax these results to [phone].        Ame Chow PA-C

## 2022-01-07 NOTE — TELEPHONE ENCOUNTER
DATE:  1/4/2021   TIME OF RECEIPT FROM LAB:  12:11  LAB TEST:  Alk phos  LAB VALUE:  315  RESULTS GIVEN WITH READ-BACK TO (PROVIDER):  Marleny Brown  TIME LAB VALUE REPORTED TO PROVIDER:   12:11     Admission

## 2022-01-12 ENCOUNTER — TELEPHONE (OUTPATIENT)
Dept: TRANSPLANT | Facility: CLINIC | Age: 62
End: 2022-01-12
Payer: MEDICARE

## 2022-01-12 ENCOUNTER — LAB (OUTPATIENT)
Dept: LAB | Facility: CLINIC | Age: 62
End: 2022-01-12

## 2022-01-12 PROCEDURE — 80197 ASSAY OF TACROLIMUS: CPT | Performed by: PHYSICIAN ASSISTANT

## 2022-01-12 NOTE — TELEPHONE ENCOUNTER
DATE:  1/12/2022     TIME OF RECEIPT FROM LAB:  12:55 PM    ORDERING PROVIDER:     LAB TEST:  Creatinine    LAB VALUE:  5.5    RESULTS GIVEN WITH READ-BACK TO (PROVIDER):  Teams message sent to Mikayla Latham RN    TIME LAB VALUE REPORTED TO PROVIDER:   12:57 PM

## 2022-01-14 LAB
TACROLIMUS BLD-MCNC: 8.4 UG/L (ref 5–15)
TME LAST DOSE: NORMAL H
TME LAST DOSE: NORMAL H

## 2022-01-14 NOTE — RESULT ENCOUNTER NOTE
Armand Mcdonnell,   Your tacrolimus level was 8.4 at 12 hours on 1/12/22 which is within your goal range of 8-10. No dose change at this time. Please call the transplant office ([phone]) with any questions.   Thanks,   Mikayla

## 2022-01-24 ENCOUNTER — TRANSFERRED RECORDS (OUTPATIENT)
Dept: HEALTH INFORMATION MANAGEMENT | Facility: CLINIC | Age: 62
End: 2022-01-24
Payer: MEDICARE

## 2022-01-25 ENCOUNTER — TELEPHONE (OUTPATIENT)
Dept: TRANSPLANT | Facility: CLINIC | Age: 62
End: 2022-01-25

## 2022-01-25 ENCOUNTER — PREP FOR PROCEDURE (OUTPATIENT)
Dept: PULMONOLOGY | Facility: CLINIC | Age: 62
End: 2022-01-25

## 2022-01-25 DIAGNOSIS — Z94.2 LUNG REPLACED BY TRANSPLANT (H): Primary | ICD-10-CM

## 2022-01-25 NOTE — TELEPHONE ENCOUNTER
Patient was rescheduled to 02/11 due to a positive COVID test.     Patient confirmed details,   Updated [email or web address] message was sent.     Lore Avila  Yasmin-Op Coordinator  [phone]

## 2022-01-25 NOTE — TELEPHONE ENCOUNTER
Patient calls reporting her COVID test from 1/24/22 was positive. This was done as a pre procedure test. She is not having any symptoms. Reviewed with Ame and plan to hold Imuran, start Eliquis 2.5 mg BID for 1 month (CrCl <30), arrange for MoA infusion (pt filled out form on [facility] website), and monitor symptoms and oxygen sats at least 4x daily and let writer know if they are running less than 94%. To isolate for 20 days. Will start doing weekly testing until 2 negative tests then can bring patient back to clinic/bronch. Order faxed to local lab. Also notified dialysis center of dx and that she will be on anticoagulation for a month.     Pt reports she was matched to do MoA in [place], just awaiting a call from them to schedule.

## 2022-01-25 NOTE — LETTER
PHYSICIAN ORDERS      DATE & TIME ISSUED: 2022 12:31 PM  PATIENT NAME: Kecia Blue   : 1960     [facility] MR# [if applicable]: [id number]     DIAGNOSIS:  Lung Transplant  Z94.2    Please do COVID 19 PCR test weekly x3 weeks    Any questions please call: Mikayla     Please fax these results to [phone].        Ame Chow PA-C

## 2022-01-27 ENCOUNTER — TELEPHONE (OUTPATIENT)
Dept: TRANSPLANT | Facility: CLINIC | Age: 62
End: 2022-01-27

## 2022-01-27 NOTE — LETTER
PHYSICIAN ORDERS      DATE & TIME ISSUED: 2022 9:12 AM  PATIENT NAME: Kecia Blue   : 1960     [facility] MR# [if applicable]: [id number]     DIAGNOSIS:  Lung Transplant  Z94.2    COVID-19 PCR test today    Any questions please call: Mikayla     Please fax these results to [phone].        Ame Chow PA-C

## 2022-01-27 NOTE — TELEPHONE ENCOUNTER
Pt calls reporting her nephrologist doesn't think she has COVID given she does not have any symptoms. He had her do 2 home tests yesterday both of which were negative. She is wanting to do another PCR test today. Order faxed. Will update Ame with results.

## 2022-01-29 ENCOUNTER — HEALTH MAINTENANCE LETTER (OUTPATIENT)
Age: 62
End: 2022-01-29

## 2022-01-31 ENCOUNTER — TELEPHONE (OUTPATIENT)
Dept: TRANSPLANT | Facility: CLINIC | Age: 62
End: 2022-01-31
Payer: MEDICARE

## 2022-01-31 NOTE — TELEPHONE ENCOUNTER
Prior Authorization Retail Medication Request    Medication/Dose: Posaconazole 100mg   ICD code (if different than what is on RX): Aspergillus (H) [B44.9]Lung replaced by transplant (H) [Z94.2]     Previously Tried and Failed:    Rationale:      Insurance Name:    Insurance ID:        Pharmacy Information (if different than what is on RX)  Name:    Phone:

## 2022-02-01 NOTE — TELEPHONE ENCOUNTER
Prior Authorization Approval    Authorization Effective Date:  1/1/22  Authorization Expiration Date:  7/30/22  Medication: Posaconazole 100mg DR- approved  Approved Dose/Quantity: UD  Reference #:     Insurance Company: [employer] - Phone [phone] Fax [phone]  Expected CoPay:       CoPay Card Available:      Foundation Assistance Needed:    Which Pharmacy is filling the prescription (Not needed for infusion/clinic administered): [facility][id number] - [address]  Pharmacy Notified:  yes  Patient Notified:  Yes- patient already picke dup medication

## 2022-02-02 ENCOUNTER — TELEPHONE (OUTPATIENT)
Dept: TRANSPLANT | Facility: CLINIC | Age: 62
End: 2022-02-02
Payer: MEDICARE

## 2022-02-02 NOTE — TELEPHONE ENCOUNTER
Reaching out to patient regarding MoA for covid + on 1/24 with 1/28 and 1/31 tests being negative and per Ame Sanchezls up to the patient weather or not to get them but might be too late at this point. Patient has had no symptoms and so might have been a false positive. Continue imuran medication for now and no blood thinners at this point. Advised patient to keep us posted on if any new symptoms come up

## 2022-02-08 NOTE — PROGRESS NOTES
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 She was supposed to be seen in January but had a pre procedure + covid test (with three subsequent negatives, one four days later to follow, so suspect false negative), who is seen today for pre op clearance.    1. Bilateral lung transplant: no new pulmonary complaints, mobility is limited by her left knee pain. Sating 99% on room air. DSA 9/15 negative. CXR reviewed by me with stable basilar and perihilar opacities. PFTs are stable at her baseline. No acute issues.   Continue AZA 25 mg daily, continue tacrolimus (goal 8-10) and prednisone   - On Bactrim 3 times/week    2. Right main bronchial stenosis s/p ligation: scheduled for bronch tomorrow.   - Continue albuterol and pulmicort nebs daily    3. Congestive hepatopathy with fibrosis: complicated by ascites secondary to portal hypertension. Chronic elevation of alk phos. Fluid optimization with dialysis. Recently seen by Dr. Denise.     4. H/o CMV viremia (D+/R+): VGCV ppx with steroid burst, completed 2/23. Last CMV on 1/12 negative.   - CMV pending for today     5.H/o EBV viremia: last level negative on 12/1/21  - EBV pending for today     6. Left-sided aspergillus empyema: noted 10/8/19. CT scan on 7/17/20 with increased mass-like density in LLL area s/p needle aspiration (8/13/20) with Aspergillus fumigatus on cultures s/p intrapleural bead placement (amphoterecin, 11/20). S/p chest tube drainage in May 2021. Has been stable. Likely with be on posaconazole for life per ID.   - Levels  "every 3-6 months, last level of 1.7 on 9/23/21, ordered for today  - Continue posaconazole    7. ESRD on iHD (since 10/2019): access is AVF with partial graft. Continues now on iHD M/W/F, dry weight is currently 124 lbs.  Transplant evaluation on hold for ongoing treatment of empyema. Follows with Nephrology    8. HTN: BP is excellent.   - Continue metoprolol    Chronic issues:  1. Paroxysmal AFib  2. Hypomagnesemia  3. Dermatomyositis with Raynauds  4. Left knee pain: discussed knee replacement today    We discussed the risks and benefits of receiving EVUSHELD, as well as alternative treatments. The Emergency Use Administration (EUA) was provided to the patient for review and an opportunity for questions was provided. Patient wishes to proceed with EVUSHELD.    RTC: 3 months  Annuals: ordered for next visit; defer walk test  Preventative: received her 3rd covid vaccine; colonoscopy 2022    Ame Chow PA-C  Pulmonary, Allergy, Critical Care and Sleep Medicine        Interval History:     No symptoms with her \"positive\" covid test, then multiple negative tests to follow. Overall has been doing well, minimal activity with the knee pain, got a new brace for it yesterday. Needs a knee replacement. No shortness of breath, tightness or wheezing. Minimal morning cough, baseline, no fever or chills. No chest pain or palpitations. No new GI issues. Appetite has improved a lot.           Review of Systems:   Please see HPI, otherwise the complete 10 point ROS is negative.           Past Medical and Surgical History:     Past Medical History:   Diagnosis Date     Acute on chronic respiratory failure with hypoxia (H) 02/21/2018     Anisocoria      Antisynthetase syndrome (H) 2014     Anxiety      Aspergillus (H)      Aspergillus pneumonia (H) 11/20/2020     Benign essential hypertension      C. difficile colitis      Cytomegalovirus (CMV) viremia (H)      Dermatomyositis (H)      Dysplasia of cervix, low grade (ESTRADA 1)      " EBV (Franklin-Barr virus) viremia      ESRD (end stage renal disease) on dialysis (H)      ILD (interstitial lung disease) (H)      Lung replaced by transplant (H)      Osteopenia      Paroxysmal atrial fibrillation (H)      Pneumocystis jiroveci pneumonia (H)      PONV (postoperative nausea and vomiting)      Post-menopause      Pulmonary hypertension (H)      Raynaud's disease      Seronegative rheumatoid arthritis (H)      Past Surgical History:   Procedure Laterality Date     BRONCHOSCOPY (RIGID OR FLEXIBLE), DIAGNOSTIC N/A 4/10/2018    Procedure: COMBINED BRONCHOSCOPY (RIGID OR FLEXIBLE), LAVAGE;;  Surgeon: Mariposa Donohue MD;  Location: [facility]     BRONCHOSCOPY (RIGID OR FLEXIBLE), DIAGNOSTIC N/A 12/23/2020    Procedure: BRONCHOSCOPY, WITH BRONCHOALVEOLAR LAVAGE;  Surgeon: Uri Bass MD;  Location: [facility]     BRONCHOSCOPY (RIGID OR FLEXIBLE), DILATE BRONCHUS / TRACHEA N/A 10/11/2018    Procedure: BRONCHOSCOPY (RIGID OR FLEXIBLE), DILATE BRONCHUS / TRACHEA;  Flexible And Rigid Bronchoscopy and Dilation;  Surgeon: Wilber Lin MD;  [facility]     BRONCHOSCOPY FLEXIBLE N/A 3/13/2018    Procedure: BRONCHOSCOPY FLEXIBLE;  Flexible Bronchoscopy ;  Surgeon: Gissell Sanchez MD;  Location: [facility]     BRONCHOSCOPY FLEXIBLE N/A 5/9/2018    Procedure: BRONCHOSCOPY FLEXIBLE;;  Surgeon: Wilber Lin MD;  [facility]     BRONCHOSCOPY FLEXIBLE AND RIGID N/A 9/10/2018    Procedure: BRONCHOSCOPY FLEXIBLE AND RIGID;  Flexible and Rigid Bronchoscopy with Balloon Dilation, tissue debulking with cryo, and Right mainstem bronchus stent placement;  Surgeon: Wilber Lin MD;  [facility]     BRONCHOSCOPY RIGID N/A 6/6/2018    Procedure: BRONCHOSCOPY RIGID;;  Surgeon: Lopez Macias MD;  [facility]: [facility]     BRONCHOSCOPY, DILATE BRONCHUS, STENT BRONCHUS, COMBINED N/A 6/11/2018    Procedure: COMBINED BRONCHOSCOPY, DILATE BRONCHUS, STENT BRONCHUS;  Flexible Bronchoscopy,  Balloon Dilation, Bronchial Washings;  Surgeon: Wilber Lin MD;  [facility]     BRONCHOSCOPY, DILATE BRONCHUS, STENT BRONCHUS, COMBINED Right 7/10/2018    Procedure: COMBINED BRONCHOSCOPY, DILATE BRONCHUS, STENT BRONCHUS;  Flexible Bronchoscopy, Balloon Dilation, Bronchial Washings  ;  Surgeon: Wilber Lin MD;  [facility]     BRONCHOSCOPY, DILATE BRONCHUS, STENT BRONCHUS, COMBINED N/A 8/2/2018    Procedure: COMBINED BRONCHOSCOPY, DILATE BRONCHUS, STENT BRONCHUS;  Flexible Bronchoscopy, Bronchial Washings, Balloon Dilation;  Surgeon: Wilber Lin MD;  [facility]     BRONCHOSCOPY, DILATE BRONCHUS, STENT BRONCHUS, COMBINED N/A 8/20/2018    Procedure: COMBINED BRONCHOSCOPY, DILATE BRONCHUS, STENT BRONCHUS;  Flexible Bronchoscopy, Balloon Dilation;  Surgeon: Wilber Lin MD;  [facility]     BRONCHOSCOPY, DILATE BRONCHUS, STENT BRONCHUS, COMBINED N/A 10/29/2018    Procedure: Flexible Bronchoscopy, Balloon Dilation, Stent Revision, Airway Examination And Therapeutic Suctioning, Cyro Tumor Debulking;  Surgeon: Wilber Lin MD;  [facility]     BRONCHOSCOPY, DILATE BRONCHUS, STENT BRONCHUS, COMBINED N/A 11/20/2018    Procedure: Rigid Bronchoscopy, Stent Removal and dilitation;  Surgeon: Wilber Lin MD;  [facility]     BRONCHOSCOPY, DILATE BRONCHUS, STENT BRONCHUS, COMBINED N/A 12/14/2018    Procedure: Flexible And Rigid Bronchoscopy, Balloon Dilation, Bronchial Washing;  Surgeon: Wilber Lin MD;  [facility]     BRONCHOSCOPY, DILATE BRONCHUS, STENT BRONCHUS, COMBINED N/A 1/17/2019    Procedure: Flexible And Rigid Bronchoscopy, Balloon Dilation.;  Surgeon: Wilber Lin MD;  [facility]     BRONCHOSCOPY, DILATE BRONCHUS, STENT BRONCHUS, COMBINED N/A 3/7/2019    Procedure: Flexible and Rigid Bronchoscopy, Bronchial Washing, Balloon Dilation;  Surgeon: Wilber Lin MD;  [facility]      BRONCHOSCOPY, DILATE BRONCHUS, STENT BRONCHUS, COMBINED N/A 6/6/2019    Procedure: Rigid and Flexible Bronchoscopy, Balloon Dilation;  Surgeon: Wilber Lin MD;  [facility]     BRONCHOSCOPY, DILATE BRONCHUS, STENT BRONCHUS, COMBINED N/A 10/11/2019    Procedure: Flexible and Rigid Bronchoscopy, Balloon Dilation, Bronchoalveolar Lagave;  Surgeon: Wilber Lin MD;  [facility]     BRONCHOSCOPY, DILATE BRONCHUS, STENT BRONCHUS, COMBINED N/A 2/19/2021    Procedure: BRONCHOSCOPY, flexible, airway dilation, and bronchial wash;  Surgeon: Wilber Lin MD;  [facility]     BRONCHOSCOPY, DILATE BRONCHUS, STENT BRONCHUS, COMBINED N/A 4/9/2021    Procedure: BRONCHOSCOPY, flexible and rigid, Airway suctioning;  Surgeon: Mati Norris MD;  [facility]     CV RIGHT HEART CATH MEASUREMENTS RECORDED N/A 3/10/2020    Procedure: CV RIGHT HEART CATH;  Surgeon: Wai Garcia MD;  [facility]     ENT SURGERY      tonsillectomy as a child     ESOPHAGOSCOPY, GASTROSCOPY, DUODENOSCOPY (EGD), COMBINED N/A 10/29/2018    Procedure: COMBINED ESOPHAGOSCOPY, GASTROSCOPY, DUODENOSCOPY (EGD) with biopsies and polypectomy;  Surgeon: Chente Bloom MD;  [facility]     INSERT EXTRACORPORAL MEMBRANE OXYGENATOR ADULT (OUTSIDE OR) N/A 2/27/2018    Procedure: INSERT EXTRACORPORAL MEMBRANE OXYGENATOR ADULT (OUTSIDE OR);  INSERT EXTRACORPORAL MEMBRANE OXYGENATOR ADULT (OUTSIDE OR) ;  Surgeon: Toby Hernandez MD;  [facility]     IR CVC TUNNEL PLACEMENT > 5 YRS OF AGE  10/25/2019     IR DIALYSIS FISTULOGRAM LEFT  3/2/2021     IR DIALYSIS MECH THROMB, PTA  3/2/2021     IR FLUORO 0-1 HOUR  5/7/2021     IR GASTRO JEJUNOSTOMY TUBE PLACEMENT  2/16/2021     IR PARACENTESIS  1/8/2020     IR THORACENTESIS  9/13/2019     IR TRANSCATHETER BIOPSY  1/8/2020     LASER CO2 BRONCHOSCOPY N/A 4/30/2021    Procedure: Flexible and Rigid Bronchoscopy and Tissue  Debulking with CO2 Laser Assistance;  Surgeon: Mati Norris MD;  [facility]     LASER CO2 BRONCHOSCOPY N/A 2021    Procedure: BRONCHOSCOPY, Flexible and Rigid Bronchoscopy, Tissue Debulking with cryo Assistance, airway dilation,;  Surgeon: Mati Norris MD;  [facility]     LASER CO2 BRONCHOSCOPY N/A 2021    Procedure: BRONCHOSCOPY, flexible and rigid, CO2 Laser Debulking;  Surgeon: Mati Norris MD;  [facility]     no prior surgery       REMOVE EXTRACORPORAL MEMBRANE OXYGENATOR ADULT N/A 3/3/2018    Procedure: REMOVE EXTRACORPORAL MEMBRANE OXYGENATOR ADULT;  Removal of Right Femoral Venous and Right Axillary Arterial Extracorporeal Membrane Oxygenator;  Surgeon: Toby Hernandez MD;  [facility]     TRANSPLANT LUNG RECIPIENT SINGLE X2 Bilateral 3/1/2018    Procedure: TRANSPLANT LUNG RECIPIENT SINGLE X2;  Median Sternotomy, Extracorporeal Membrane Oxygenator, bilateral sequential lung transplant;  Surgeon: Toby Hernandez MD;  [facility]           Family History:     Family History   Problem Relation Age of Onset     Hypertension Mother      Arthritis Mother      Pancreatic Cancer Father      Diabetes Father             Social History:     Social History     Socioeconomic History     Marital status:      Spouse name: Not on file     Number of children: Not on file     Years of education: Not on file     Highest education level: Not on file   Occupational History     Not on file   Tobacco Use     Smoking status: Never Smoker     Smokeless tobacco: Never Used   Substance and Sexual Activity     Alcohol use: No     Alcohol/week: 1.0 standard drink     Types: 1 Glasses of wine per week     Drug use: No     Sexual activity: Not on file   Other Topics Concern     Parent/sibling w/ CABG, MI or angioplasty before 65F 55M? No   Social History Narrative    3/6/2019 - Lives with . Has three daughters. Four grandchildren (two ). No pets.  Travelled previously to [facility]. Has visited [place] several times.      Social Determinants of Health     Financial Resource Strain: Not on file   Food Insecurity: Not on file   Transportation Needs: Not on file   Physical Activity: Not on file   Stress: Not on file   Social Connections: Not on file   Intimate Partner Violence: Not on file   Housing Stability: Not on file            Medications:     Current Outpatient Medications   Medication     acetaminophen (TYLENOL) 325 MG tablet     albuterol (PROVENTIL) (2.5 MG/3ML) 0.083% neb solution     azaTHIOprine (IMURAN) 50 MG tablet     budesonide (PULMICORT) 0.5 MG/2ML neb solution     calcium acetate (PHOSLO) 667 MG CAPS capsule     Magnesium Cl-Calcium Carbonate (SLOW-MAG) 71.5-119 MG TBEC     metoprolol tartrate (LOPRESSOR) 25 MG tablet     multivitamin RENAL (RENAVITE RX/NEPHROVITE) 1 MG tablet     pantoprazole (PROTONIX) 40 MG EC tablet     posaconazole (NOXAFIL) 100 MG EC tablet     predniSONE (DELTASONE) 2.5 MG tablet     predniSONE (DELTASONE) 5 MG tablet     sulfamethoxazole-trimethoprim (BACTRIM) 400-80 MG tablet     tacrolimus (GENERIC EQUIVALENT) 0.5 MG capsule     No current facility-administered medications for this visit.            Physical Exam:   /71   Pulse 91   LMP 06/07/2014 (Exact Date)   SpO2 99%     GENERAL: alert, NAD  HEENT: NCAT, EOMI, no scleral icterus, oral mucosa moist and without lesions  Neck: no cervical or supraclavicular adenopathy  Lungs: moderate airflow,mainly clear  CV: RRR, S1S2, no murmurs noted  Abdomen: normoactive BS, soft, non tender   Lymph:trace BLE edema  Neuro: AAO X 3, CN 2-12 grossly intact  Psychiatric: normal affect, good eye contact  Skin: no rash, jaundice or lesions on limited exam  MSK: left knee in brace         Data:   All laboratory and imaging data reviewed.      Recent Results (from the past 168 hour(s))   COVID-19 VIRUS (CORONAVIRUS) BY PCR (EXTERNAL RESULT)    Collection Time: 02/07/22   1:01 PM   Result Value Ref Range    Specimen Source Nasopharynx     COVID-19 Virus PCR to [facility] - Result Undetected Undetected    SARS-CoV-2 PCR Comment SEE COMMENTS    COVID-19 Virus (Coronavirus) by PCR (External Result)    Collection Time: 02/07/22  1:01 PM   Result Value Ref Range    COVID-19 Virus by PCR (External Result) Undetected Undetected   Basic metabolic panel    Collection Time: 02/10/22  9:19 AM   Result Value Ref Range    Sodium 134 133 - 144 mmol/L    Potassium 4.3 3.4 - 5.3 mmol/L    Chloride 94 94 - 109 mmol/L    Carbon Dioxide (CO2) 30 20 - 32 mmol/L    Anion Gap 10 3 - 14 mmol/L    Urea Nitrogen 44 (H) 7 - 30 mg/dL    Creatinine 4.27 (H) 0.52 - 1.04 mg/dL    Calcium 9.6 8.5 - 10.1 mg/dL    Glucose 157 (H) 70 - 99 mg/dL    GFR Estimate 11 (L) >60 mL/min/1.73m2   CBC with platelets    Collection Time: 02/10/22  9:20 AM   Result Value Ref Range    WBC Count 7.4 4.0 - 11.0 10e3/uL    RBC Count 3.77 (L) 3.80 - 5.20 10e6/uL    Hemoglobin 11.9 11.7 - 15.7 g/dL    Hematocrit 37.0 35.0 - 47.0 %    MCV 98 78 - 100 fL    MCH 31.6 26.5 - 33.0 pg    MCHC 32.2 31.5 - 36.5 g/dL    RDW 16.4 (H) 10.0 - 15.0 %    Platelet Count 243 150 - 450 10e3/uL   Extra Purple Top Tube    Collection Time: 02/10/22  9:20 AM   Result Value Ref Range    Hold Specimen [facility]    General PFT Lab (Please always keep checked)    Collection Time: 02/10/22  9:49 AM   Result Value Ref Range    FVC-Pred 3.20 L    FVC-Pre 1.70 L    FVC-%Pred-Pre 53 %    FEV1-Pre 1.25 L    FEV1-%Pred-Pre 49 %    FEV1FVC-Pred 79 %    FEV1FVC-Pre 73 %    FEFMax-Pred 6.35 L/sec    FEFMax-Pre 2.71 L/sec    FEFMax-%Pred-Pre 42 %    FEF2575-Pred 2.32 L/sec    FEF2575-Pre 0.96 L/sec    RFI8717-%Pred-Pre 41 %    ExpTime-Pre 6.52 sec    FIFMax-Pre 2.65 L/sec    FEV1FEV6-Pred 81 %    FEV1FEV6-Pre 74 %     PFT interpretation:  Maneuver: valid and met ATS guidelines  Normal ratio with decreased FEV1 and FVC  Compared to prior: FEV1 of 1.25 is 30 ml above prior  Decrease in  FVC is likely related to restriction; lung volumes would be necessary to determine

## 2022-02-10 ENCOUNTER — ANESTHESIA EVENT (OUTPATIENT)
Dept: SURGERY | Facility: CLINIC | Age: 62
End: 2022-02-10
Payer: MEDICARE

## 2022-02-10 ENCOUNTER — ANCILLARY PROCEDURE (OUTPATIENT)
Dept: GENERAL RADIOLOGY | Facility: CLINIC | Age: 62
End: 2022-02-10
Payer: MEDICARE

## 2022-02-10 ENCOUNTER — LAB (OUTPATIENT)
Dept: LAB | Facility: CLINIC | Age: 62
End: 2022-02-10
Payer: MEDICARE

## 2022-02-10 ENCOUNTER — OFFICE VISIT (OUTPATIENT)
Dept: PULMONOLOGY | Facility: CLINIC | Age: 62
End: 2022-02-10
Attending: PHYSICIAN ASSISTANT
Payer: MEDICARE

## 2022-02-10 VITALS — HEART RATE: 91 BPM | DIASTOLIC BLOOD PRESSURE: 71 MMHG | SYSTOLIC BLOOD PRESSURE: 113 MMHG | OXYGEN SATURATION: 99 %

## 2022-02-10 DIAGNOSIS — Z94.2 S/P LUNG TRANSPLANT (H): ICD-10-CM

## 2022-02-10 DIAGNOSIS — Z94.2 LUNG REPLACED BY TRANSPLANT (H): ICD-10-CM

## 2022-02-10 DIAGNOSIS — E83.42 HYPOMAGNESEMIA: ICD-10-CM

## 2022-02-10 DIAGNOSIS — Z94.2 LUNG REPLACED BY TRANSPLANT (H): Primary | ICD-10-CM

## 2022-02-10 DIAGNOSIS — R79.9 ABNORMAL FINDING OF BLOOD CHEMISTRY, UNSPECIFIED: ICD-10-CM

## 2022-02-10 DIAGNOSIS — Z79.52 LONG TERM (CURRENT) USE OF SYSTEMIC STEROIDS: ICD-10-CM

## 2022-02-10 LAB
ANION GAP SERPL CALCULATED.3IONS-SCNC: 10 MMOL/L (ref 3–14)
BUN SERPL-MCNC: 44 MG/DL (ref 7–30)
CALCIUM SERPL-MCNC: 9.6 MG/DL (ref 8.5–10.1)
CHLORIDE BLD-SCNC: 94 MMOL/L (ref 94–109)
CO2 SERPL-SCNC: 30 MMOL/L (ref 20–32)
CREAT SERPL-MCNC: 4.27 MG/DL (ref 0.52–1.04)
EXPTIME-PRE: 6.52 SEC
FEF2575-%PRED-PRE: 41 %
FEF2575-PRE: 0.96 L/SEC
FEF2575-PRED: 2.32 L/SEC
FEFMAX-%PRED-PRE: 42 %
FEFMAX-PRE: 2.71 L/SEC
FEFMAX-PRED: 6.35 L/SEC
FEV1-%PRED-PRE: 49 %
FEV1-PRE: 1.25 L
FEV1FEV6-PRE: 74 %
FEV1FEV6-PRED: 81 %
FEV1FVC-PRE: 73 %
FEV1FVC-PRED: 79 %
FIFMAX-PRE: 2.65 L/SEC
FVC-%PRED-PRE: 53 %
FVC-PRE: 1.7 L
FVC-PRED: 3.2 L
GFR SERPL CREATININE-BSD FRML MDRD: 11 ML/MIN/1.73M2
GLUCOSE BLD-MCNC: 157 MG/DL (ref 70–99)
HOLD SPECIMEN: NORMAL
MAGNESIUM SERPL-MCNC: 1.8 MG/DL (ref 1.8–2.6)
POTASSIUM BLD-SCNC: 4.3 MMOL/L (ref 3.4–5.3)
SODIUM SERPL-SCNC: 134 MMOL/L (ref 133–144)
TACROLIMUS BLD-MCNC: 5.8 UG/L (ref 5–15)
TME LAST DOSE: NORMAL H
TME LAST DOSE: NORMAL H

## 2022-02-10 PROCEDURE — 96372 THER/PROPH/DIAG INJ SC/IM: CPT | Performed by: PHYSICIAN ASSISTANT

## 2022-02-10 PROCEDURE — M0220 HC INJECTION TIXAGEVIMAB & CILGAVIMAB (EVUSHELD): HCPCS | Performed by: PHYSICIAN ASSISTANT

## 2022-02-10 PROCEDURE — G0463 HOSPITAL OUTPT CLINIC VISIT: HCPCS | Mod: 25 | Performed by: PHYSICIAN ASSISTANT

## 2022-02-10 PROCEDURE — 80187 DRUG ASSAY POSACONAZOLE: CPT | Performed by: PHYSICIAN ASSISTANT

## 2022-02-10 PROCEDURE — 87799 DETECT AGENT NOS DNA QUANT: CPT | Performed by: PHYSICIAN ASSISTANT

## 2022-02-10 PROCEDURE — 85027 COMPLETE CBC AUTOMATED: CPT

## 2022-02-10 PROCEDURE — 36415 COLL VENOUS BLD VENIPUNCTURE: CPT | Performed by: PATHOLOGY

## 2022-02-10 PROCEDURE — 71046 X-RAY EXAM CHEST 2 VIEWS: CPT | Mod: GC | Performed by: RADIOLOGY

## 2022-02-10 PROCEDURE — 99214 OFFICE O/P EST MOD 30 MIN: CPT | Mod: 25 | Performed by: PHYSICIAN ASSISTANT

## 2022-02-10 PROCEDURE — 94375 RESPIRATORY FLOW VOLUME LOOP: CPT | Performed by: PHYSICIAN ASSISTANT

## 2022-02-10 PROCEDURE — 250N000011 HC RX IP 250 OP 636: Performed by: PHYSICIAN ASSISTANT

## 2022-02-10 PROCEDURE — 83735 ASSAY OF MAGNESIUM: CPT | Performed by: PHYSICIAN ASSISTANT

## 2022-02-10 PROCEDURE — 80048 BASIC METABOLIC PNL TOTAL CA: CPT | Performed by: PATHOLOGY

## 2022-02-10 PROCEDURE — 80197 ASSAY OF TACROLIMUS: CPT | Performed by: INTERNAL MEDICINE

## 2022-02-10 RX ORDER — BUDESONIDE 0.5 MG/2ML
0.5 INHALANT ORAL DAILY
Qty: 60 ML | Refills: 11 | COMMUNITY
Start: 2022-02-10 | End: 2023-02-09

## 2022-02-10 RX ORDER — ALBUTEROL SULFATE 0.83 MG/ML
2.5 SOLUTION RESPIRATORY (INHALATION) DAILY
Qty: 360 ML | Refills: 4 | COMMUNITY
Start: 2022-02-10 | End: 2023-02-09

## 2022-02-10 RX ADMIN — Medication: at 10:50

## 2022-02-10 NOTE — LETTER
2/10/2022     RE: Kecia Blue  [address] [place] [address]    Dear Colleague,    Thank you for referring your patient, Kecia Blue, to the [facility] [place]. Please see a copy of my visit note below.    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 She was supposed to be seen in January but had a pre procedure + covid test (with three subsequent negatives, one four days later to follow, so suspect false negative), who is seen today for pre op clearance.    1. Bilateral lung transplant: no new pulmonary complaints, mobility is limited by her left knee pain. Sating 99% on room air. DSA 9/15 negative. CXR reviewed by me with stable basilar and perihilar opacities. PFTs are stable at her baseline. No acute issues.   Continue AZA 25 mg daily, continue tacrolimus (goal 8-10) and prednisone   - On Bactrim 3 times/week    2. Right main bronchial stenosis s/p ligation: scheduled for bronch tomorrow.   - Continue albuterol and pulmicort nebs daily    3. Congestive hepatopathy with fibrosis: complicated by ascites secondary to portal hypertension. Chronic elevation of alk phos. Fluid optimization with dialysis. Recently seen by Dr. Denise.     4. H/o CMV viremia (D+/R+): VGCV ppx with steroid burst, completed 2/23. Last CMV on 1/12 negative.   - CMV pending for today     5.H/o EBV viremia: last level negative on 12/1/21  - EBV pending for today     6. Left-sided aspergillus empyema: noted 10/8/19. CT scan on  "7/17/20 with increased mass-like density in LLL area s/p needle aspiration (8/13/20) with Aspergillus fumigatus on cultures s/p intrapleural bead placement (amphoterecin, 11/20). S/p chest tube drainage in May 2021. Has been stable. Likely with be on posaconazole for life per ID.   - Levels every 3-6 months, last level of 1.7 on 9/23/21, ordered for today  - Continue posaconazole    7. ESRD on iHD (since 10/2019): access is AVF with partial graft. Continues now on iHD M/W/F, dry weight is currently 124 lbs.  Transplant evaluation on hold for ongoing treatment of empyema. Follows with Nephrology    8. HTN: BP is excellent.   - Continue metoprolol    Chronic issues:  1. Paroxysmal AFib  2. Hypomagnesemia  3. Dermatomyositis with Raynauds  4. Left knee pain: discussed knee replacement today    We discussed the risks and benefits of receiving EVUSHELD, as well as alternative treatments. The Emergency Use Administration (EUA) was provided to the patient for review and an opportunity for questions was provided. Patient wishes to proceed with EVUSHELD.    RTC: 3 months  Annuals: ordered for next visit; defer walk test  Preventative: received her 3rd covid vaccine; colonoscopy 2022    Ame Chow PA-C  Pulmonary, Allergy, Critical Care and Sleep Medicine        Interval History:     No symptoms with her \"positive\" covid test, then multiple negative tests to follow. Overall has been doing well, minimal activity with the knee pain, got a new brace for it yesterday. Needs a knee replacement. No shortness of breath, tightness or wheezing. Minimal morning cough, baseline, no fever or chills. No chest pain or palpitations. No new GI issues. Appetite has improved a lot.           Review of Systems:   Please see HPI, otherwise the complete 10 point ROS is negative.           Past Medical and Surgical History:     Past Medical History:   Diagnosis Date     Acute on chronic respiratory failure with hypoxia (H) 02/21/2018     " Anisocoria      Antisynthetase syndrome (H) 2014     Anxiety      Aspergillus (H)      Aspergillus pneumonia (H) 11/20/2020     Benign essential hypertension      C. difficile colitis      Cytomegalovirus (CMV) viremia (H)      Dermatomyositis (H)      Dysplasia of cervix, low grade (ESTRADA 1)      EBV (Franklin-Barr virus) viremia      ESRD (end stage renal disease) on dialysis (H)      ILD (interstitial lung disease) (H)      Lung replaced by transplant (H)      Osteopenia      Paroxysmal atrial fibrillation (H)      Pneumocystis jiroveci pneumonia (H)      PONV (postoperative nausea and vomiting)      Post-menopause      Pulmonary hypertension (H)      Raynaud's disease      Seronegative rheumatoid arthritis (H)      Past Surgical History:   Procedure Laterality Date     BRONCHOSCOPY (RIGID OR FLEXIBLE), DIAGNOSTIC N/A 4/10/2018    Procedure: COMBINED BRONCHOSCOPY (RIGID OR FLEXIBLE), LAVAGE;;  Surgeon: Mariposa Donohue MD;  Location:  GI     BRONCHOSCOPY (RIGID OR FLEXIBLE), DIAGNOSTIC N/A 12/23/2020    Procedure: BRONCHOSCOPY, WITH BRONCHOALVEOLAR LAVAGE;  Surgeon: Uri Bass MD;  Location:  GI     BRONCHOSCOPY (RIGID OR FLEXIBLE), DILATE BRONCHUS / TRACHEA N/A 10/11/2018    Procedure: BRONCHOSCOPY (RIGID OR FLEXIBLE), DILATE BRONCHUS / TRACHEA;  Flexible And Rigid Bronchoscopy and Dilation;  Surgeon: Wilber Lin MD;  Location: [facility] OR     BRONCHOSCOPY FLEXIBLE N/A 3/13/2018    Procedure: BRONCHOSCOPY FLEXIBLE;  Flexible Bronchoscopy ;  Surgeon: Gissell Sanchez MD;  Location:  GI     BRONCHOSCOPY FLEXIBLE N/A 5/9/2018    Procedure: BRONCHOSCOPY FLEXIBLE;;  Surgeon: Wilber Lin MD;  Location:  GI     BRONCHOSCOPY FLEXIBLE AND RIGID N/A 9/10/2018    Procedure: BRONCHOSCOPY FLEXIBLE AND RIGID;  Flexible and Rigid Bronchoscopy with Balloon Dilation, tissue debulking with cryo, and Right mainstem bronchus stent placement;  Surgeon: Wilber Lin MD;  Location:  OR      BRONCHOSCOPY RIGID N/A 6/6/2018    Procedure: BRONCHOSCOPY RIGID;;  Surgeon: Lopez Macias MD;  [facility]     BRONCHOSCOPY, DILATE BRONCHUS, STENT BRONCHUS, COMBINED N/A 6/11/2018    Procedure: COMBINED BRONCHOSCOPY, DILATE BRONCHUS, STENT BRONCHUS;  Flexible Bronchoscopy, Balloon Dilation, Bronchial Washings;  Surgeon: Wilber Lin MD;  [facility]     BRONCHOSCOPY, DILATE BRONCHUS, STENT BRONCHUS, COMBINED Right 7/10/2018    Procedure: COMBINED BRONCHOSCOPY, DILATE BRONCHUS, STENT BRONCHUS;  Flexible Bronchoscopy, Balloon Dilation, Bronchial Washings  ;  Surgeon: Wilber Lin MD;  [facility]     BRONCHOSCOPY, DILATE BRONCHUS, STENT BRONCHUS, COMBINED N/A 8/2/2018    Procedure: COMBINED BRONCHOSCOPY, DILATE BRONCHUS, STENT BRONCHUS;  Flexible Bronchoscopy, Bronchial Washings, Balloon Dilation;  Surgeon: Wilber Lin MD;  [facility]     BRONCHOSCOPY, DILATE BRONCHUS, STENT BRONCHUS, COMBINED N/A 8/20/2018    Procedure: COMBINED BRONCHOSCOPY, DILATE BRONCHUS, STENT BRONCHUS;  Flexible Bronchoscopy, Balloon Dilation;  Surgeon: Wilber Lin MD;  [facility]     BRONCHOSCOPY, DILATE BRONCHUS, STENT BRONCHUS, COMBINED N/A 10/29/2018    Procedure: Flexible Bronchoscopy, Balloon Dilation, Stent Revision, Airway Examination And Therapeutic Suctioning, Cyro Tumor Debulking;  Surgeon: Wilber Lin MD;  [facility]     BRONCHOSCOPY, DILATE BRONCHUS, STENT BRONCHUS, COMBINED N/A 11/20/2018    Procedure: Rigid Bronchoscopy, Stent Removal and dilitation;  Surgeon: Wilber Lin MD;  [facility]     BRONCHOSCOPY, DILATE BRONCHUS, STENT BRONCHUS, COMBINED N/A 12/14/2018    Procedure: Flexible And Rigid Bronchoscopy, Balloon Dilation, Bronchial Washing;  Surgeon: Wilber Lin MD;  [facility]     BRONCHOSCOPY, DILATE BRONCHUS, STENT BRONCHUS, COMBINED N/A 1/17/2019    Procedure: Flexible And Rigid Bronchoscopy,  Balloon Dilation.;  Surgeon: Wilber Lin MD;  [facility]     BRONCHOSCOPY, DILATE BRONCHUS, STENT BRONCHUS, COMBINED N/A 3/7/2019    Procedure: Flexible and Rigid Bronchoscopy, Bronchial Washing, Balloon Dilation;  Surgeon: Wilber Lin MD;  [facility]     BRONCHOSCOPY, DILATE BRONCHUS, STENT BRONCHUS, COMBINED N/A 6/6/2019    Procedure: Rigid and Flexible Bronchoscopy, Balloon Dilation;  Surgeon: Wilber Lin MD;  [facility]     BRONCHOSCOPY, DILATE BRONCHUS, STENT BRONCHUS, COMBINED N/A 10/11/2019    Procedure: Flexible and Rigid Bronchoscopy, Balloon Dilation, Bronchoalveolar Lagave;  Surgeon: Wilber Lin MD;  [facility]     BRONCHOSCOPY, DILATE BRONCHUS, STENT BRONCHUS, COMBINED N/A 2/19/2021    Procedure: BRONCHOSCOPY, flexible, airway dilation, and bronchial wash;  Surgeon: Wilber Lin MD;  [facility]     BRONCHOSCOPY, DILATE BRONCHUS, STENT BRONCHUS, COMBINED N/A 4/9/2021    Procedure: BRONCHOSCOPY, flexible and rigid, Airway suctioning;  Surgeon: Mati Norris MD;  [facility]     CV RIGHT HEART CATH MEASUREMENTS RECORDED N/A 3/10/2020    Procedure: CV RIGHT HEART CATH;  Surgeon: Wai Garcia MD;  [facility] HEART [facility]ARD[facility]AC [facility]     ENT SURGERY      tonsillectomy as a child     ESOPHAGOSCOPY, GASTROSCOPY, DUODENOSCOPY (EGD), COMBINED N/A 10/29/2018    Procedure: COMBINED ESOPHAGOSCOPY, GASTROSCOPY, DUODENOSCOPY (EGD) with biopsies and polypectomy;  Surgeon: Chente Bloom MD;  [facility]     INSERT EXTRACORPORAL MEMBRANE OXYGENATOR ADULT (OUTSIDE OR) N/A 2/27/2018    Procedure: INSERT EXTRACORPORAL MEMBRANE OXYGENATOR ADULT (OUTSIDE OR);  INSERT EXTRACORPORAL MEMBRANE OXYGENATOR ADULT (OUTSIDE OR) ;  Surgeon: Toby Hernandez MD;  [facility]     IR CVC TUNNEL PLACEMENT > 5 YRS OF AGE  10/25/2019     IR DIALYSIS FISTULOGRAM LEFT  3/2/2021     IR DIALYSIS MECH THROMB, PTA  3/2/2021      IR FLUORO 0-1 HOUR  5/7/2021     IR GASTRO JEJUNOSTOMY TUBE PLACEMENT  2/16/2021     IR PARACENTESIS  1/8/2020     IR THORACENTESIS  9/13/2019     IR TRANSCATHETER BIOPSY  1/8/2020     LASER CO2 BRONCHOSCOPY N/A 4/30/2021    Procedure: Flexible and Rigid Bronchoscopy and Tissue Debulking with CO2 Laser Assistance;  Surgeon: Mati Norris MD;  [facility]     LASER CO2 BRONCHOSCOPY N/A 6/11/2021    Procedure: BRONCHOSCOPY, Flexible and Rigid Bronchoscopy, Tissue Debulking with cryo Assistance, airway dilation,;  Surgeon: Mati Norris MD;  [facility]     LASER CO2 BRONCHOSCOPY N/A 9/16/2021    Procedure: BRONCHOSCOPY, flexible and rigid, CO2 Laser Debulking;  Surgeon: Mati Norris MD;  [facility]     no prior surgery       REMOVE EXTRACORPORAL MEMBRANE OXYGENATOR ADULT N/A 3/3/2018    Procedure: REMOVE EXTRACORPORAL MEMBRANE OXYGENATOR ADULT;  Removal of Right Femoral Venous and Right Axillary Arterial Extracorporeal Membrane Oxygenator;  Surgeon: Toby Hernandez MD;  [facility]     TRANSPLANT LUNG RECIPIENT SINGLE X2 Bilateral 3/1/2018    Procedure: TRANSPLANT LUNG RECIPIENT SINGLE X2;  Median Sternotomy, Extracorporeal Membrane Oxygenator, bilateral sequential lung transplant;  Surgeon: Toby Hernandez MD;  [facility]           Family History:     Family History   Problem Relation Age of Onset     Hypertension Mother      Arthritis Mother      Pancreatic Cancer Father      Diabetes Father             Social History:     Social History     Socioeconomic History     Marital status:      Spouse name: Not on file     Number of children: Not on file     Years of education: Not on file     Highest education level: Not on file   Occupational History     Not on file   Tobacco Use     Smoking status: Never Smoker     Smokeless tobacco: Never Used   Substance and Sexual Activity     Alcohol use: No     Alcohol/week: 1.0 standard drink     Types: 1 Glasses of wine  per week     Drug use: No     Sexual activity: Not on file   Other Topics Concern     Parent/sibling w/ CABG, MI or angioplasty before 65F 55M? No   Social History Narrative    3/6/2019 - Lives with . Has three daughters. Four grandchildren (two ). No pets. Travelled previously to [place]. Has visited [place] several times.      Social Determinants of Health     Financial Resource Strain: Not on file   Food Insecurity: Not on file   Transportation Needs: Not on file   Physical Activity: Not on file   Stress: Not on file   Social Connections: Not on file   Intimate Partner Violence: Not on file   Housing Stability: Not on file            Medications:     Current Outpatient Medications   Medication     acetaminophen (TYLENOL) 325 MG tablet     albuterol (PROVENTIL) (2.5 MG/3ML) 0.083% neb solution     azaTHIOprine (IMURAN) 50 MG tablet     budesonide (PULMICORT) 0.5 MG/2ML neb solution     calcium acetate (PHOSLO) 667 MG CAPS capsule     Magnesium Cl-Calcium Carbonate (SLOW-MAG) 71.5-119 MG TBEC     metoprolol tartrate (LOPRESSOR) 25 MG tablet     multivitamin RENAL (RENAVITE RX/NEPHROVITE) 1 MG tablet     pantoprazole (PROTONIX) 40 MG EC tablet     posaconazole (NOXAFIL) 100 MG EC tablet     predniSONE (DELTASONE) 2.5 MG tablet     predniSONE (DELTASONE) 5 MG tablet     sulfamethoxazole-trimethoprim (BACTRIM) 400-80 MG tablet     tacrolimus (GENERIC EQUIVALENT) 0.5 MG capsule     No current facility-administered medications for this visit.            Physical Exam:   /71   Pulse 91   LMP 2014 (Exact Date)   SpO2 99%     GENERAL: alert, NAD  HEENT: NCAT, EOMI, no scleral icterus, oral mucosa moist and without lesions  Neck: no cervical or supraclavicular adenopathy  Lungs: moderate airflow,mainly clear  CV: RRR, S1S2, no murmurs noted  Abdomen: normoactive BS, soft, non tender   Lymph:trace BLE edema  Neuro: AAO X 3, CN 2-12 grossly intact  Psychiatric: normal affect, good eye  contact  Skin: no rash, jaundice or lesions on limited exam  MSK: left knee in brace         Data:   All laboratory and imaging data reviewed.      Recent Results (from the past 168 hour(s))   COVID-19 VIRUS (CORONAVIRUS) BY PCR (EXTERNAL RESULT)    Collection Time: 02/07/22  1:01 PM   Result Value Ref Range    Specimen Source Nasopharynx     COVID-19 Virus PCR to [facility] - Result Undetected Undetected    SARS-CoV-2 PCR Comment SEE COMMENTS    COVID-19 Virus (Coronavirus) by PCR (External Result)    Collection Time: 02/07/22  1:01 PM   Result Value Ref Range    COVID-19 Virus by PCR (External Result) Undetected Undetected   Basic metabolic panel    Collection Time: 02/10/22  9:19 AM   Result Value Ref Range    Sodium 134 133 - 144 mmol/L    Potassium 4.3 3.4 - 5.3 mmol/L    Chloride 94 94 - 109 mmol/L    Carbon Dioxide (CO2) 30 20 - 32 mmol/L    Anion Gap 10 3 - 14 mmol/L    Urea Nitrogen 44 (H) 7 - 30 mg/dL    Creatinine 4.27 (H) 0.52 - 1.04 mg/dL    Calcium 9.6 8.5 - 10.1 mg/dL    Glucose 157 (H) 70 - 99 mg/dL    GFR Estimate 11 (L) >60 mL/min/1.73m2   CBC with platelets    Collection Time: 02/10/22  9:20 AM   Result Value Ref Range    WBC Count 7.4 4.0 - 11.0 10e3/uL    RBC Count 3.77 (L) 3.80 - 5.20 10e6/uL    Hemoglobin 11.9 11.7 - 15.7 g/dL    Hematocrit 37.0 35.0 - 47.0 %    MCV 98 78 - 100 fL    MCH 31.6 26.5 - 33.0 pg    MCHC 32.2 31.5 - 36.5 g/dL    RDW 16.4 (H) 10.0 - 15.0 %    Platelet Count 243 150 - 450 10e3/uL   Extra Purple Top Tube    Collection Time: 02/10/22  9:20 AM   Result Value Ref Range    Hold Specimen [facility]    General PFT Lab (Please always keep checked)    Collection Time: 02/10/22  9:49 AM   Result Value Ref Range    FVC-Pred 3.20 L    FVC-Pre 1.70 L    FVC-%Pred-Pre 53 %    FEV1-Pre 1.25 L    FEV1-%Pred-Pre 49 %    FEV1FVC-Pred 79 %    FEV1FVC-Pre 73 %    FEFMax-Pred 6.35 L/sec    FEFMax-Pre 2.71 L/sec    FEFMax-%Pred-Pre 42 %    FEF2575-Pred 2.32 L/sec    FEF2575-Pre 0.96 L/sec     RCZ2688-%Pred-Pre 41 %    ExpTime-Pre 6.52 sec    FIFMax-Pre 2.65 L/sec    FEV1FEV6-Pred 81 %    FEV1FEV6-Pre 74 %     PFT interpretation:  Maneuver: valid and met ATS guidelines  Normal ratio with decreased FEV1 and FVC  Compared to prior: FEV1 of 1.25 is 30 ml above prior  Decrease in FVC is likely related to restriction; lung volumes would be necessary to determine    Again, thank you for allowing me to participate in the care of your patient.      Sincerely,    Ame Chow PA-C

## 2022-02-10 NOTE — PATIENT INSTRUCTIONS
Patient Instructions  1. Continue to hydrate with 60-70 oz fluids daily.  2. Continue to exercise daily or most days of the week.  3. Follow up with your primary care provider for annual gender health maintenance procedures.  4. Follow up with colonoscopy schedule.  5. Follow up with annual dermatology visits.  6. Let me know if you want to see orthopedics here if you want to have the knee replacement here.     Next transplant clinic appointment: TBD before next bronch with lab, xray, full PFT and DEXA scan  Next lab draw: monthly

## 2022-02-10 NOTE — NURSING NOTE
Transplant Coordinator Note     Reason for visit: Post lung transplant follow up visit   Coordinator: Present (via phone)  Caregiver: Ranjan Walter concerns addressed today:  1. Positive COVID test on 1/24/22, but no symptoms, many negative tests afterward.   2. Doing well since last visit. No SOB.   3. Brace on L knee, very much limiting activity. Needs a knee replacement. Managing locally and would get surgery there.   4.   5.     Activity/rehab: Limited due to knee.  Oxygen needs: RA  Pain management/RX: Denies  High risk donor: Yes  CMV status: D+/R+  DVT/PE: H/o DVT  Post op AFIB/follow up with EP: One time occurrence of a-fib  PJP prophylactic: Bactrim    Pt Education: medications (use/dose/side effects), how/when to call coordinator, frequency of labs, s/s of infection/rejection, call prior to starting any new medications, lab/vital sign book     Health Maintenance:     Last colonoscopy:     Next colonoscopy due:     Dermatology:    Vaccinations this visit: Gil today.     Labs, CXR, PFTs reviewed with patient  Medication record reviewed and reconciled  Questions and concerns addressed    Patient Instructions  1. Continue to hydrate with 60-70 oz fluids daily.  2. Continue to exercise daily or most days of the week.  3. Follow up with your primary care provider for annual gender health maintenance procedures.  4. Follow up with colonoscopy schedule.  5. Follow up with annual dermatology visits.  6. Let me know if you want to see orthopedics here if you want to have the knee replacement here.     Next transplant clinic appointment: TBD before next bronch with lab, xray, full PFT and DEXA scan  Next lab draw: monthly    AVS printed at time of check out

## 2022-02-11 ENCOUNTER — VIRTUAL VISIT (OUTPATIENT)
Dept: INFECTIOUS DISEASES | Facility: CLINIC | Age: 62
End: 2022-02-11
Attending: STUDENT IN AN ORGANIZED HEALTH CARE EDUCATION/TRAINING PROGRAM
Payer: MEDICARE

## 2022-02-11 ENCOUNTER — HOSPITAL ENCOUNTER (OUTPATIENT)
Facility: CLINIC | Age: 62
Discharge: HOME | End: 2022-02-11
Attending: STUDENT IN AN ORGANIZED HEALTH CARE EDUCATION/TRAINING PROGRAM | Admitting: STUDENT IN AN ORGANIZED HEALTH CARE EDUCATION/TRAINING PROGRAM
Payer: MEDICARE

## 2022-02-11 ENCOUNTER — ANESTHESIA (OUTPATIENT)
Dept: SURGERY | Facility: CLINIC | Age: 62
End: 2022-02-11
Payer: MEDICARE

## 2022-02-11 ENCOUNTER — TELEPHONE (OUTPATIENT)
Dept: TRANSPLANT | Facility: CLINIC | Age: 62
End: 2022-02-11

## 2022-02-11 VITALS
OXYGEN SATURATION: 100 % | HEIGHT: 63 IN | WEIGHT: 123.9 LBS | HEART RATE: 88 BPM | RESPIRATION RATE: 18 BRPM | TEMPERATURE: 98.4 F | BODY MASS INDEX: 21.95 KG/M2 | DIASTOLIC BLOOD PRESSURE: 72 MMHG | SYSTOLIC BLOOD PRESSURE: 122 MMHG

## 2022-02-11 DIAGNOSIS — B27.00 EBV (EPSTEIN-BARR VIRUS) VIREMIA: ICD-10-CM

## 2022-02-11 DIAGNOSIS — Z79.2 ADMINISTRATION OF LONG-TERM PROPHYLACTIC ANTIBIOTICS: ICD-10-CM

## 2022-02-11 DIAGNOSIS — N18.6 ESRD (END STAGE RENAL DISEASE) (H): ICD-10-CM

## 2022-02-11 DIAGNOSIS — B44.89 INFECTION BY ASPERGILLUS FUMIGATUS (H): Primary | ICD-10-CM

## 2022-02-11 DIAGNOSIS — Z76.82 ORGAN TRANSPLANT CANDIDATE: ICD-10-CM

## 2022-02-11 DIAGNOSIS — K74.00 LIVER FIBROSIS: ICD-10-CM

## 2022-02-11 DIAGNOSIS — Z94.2 LUNG REPLACED BY TRANSPLANT (H): ICD-10-CM

## 2022-02-11 DIAGNOSIS — Z94.2 LUNG REPLACED BY TRANSPLANT (H): Primary | ICD-10-CM

## 2022-02-11 DIAGNOSIS — Z94.2 LUNG TRANSPLANT STATUS, BILATERAL (H): ICD-10-CM

## 2022-02-11 LAB
APPEARANCE FLD: ABNORMAL
COLOR FLD: COLORLESS
EBV DNA COPIES/ML, INSTRUMENT: ABNORMAL COPIES/ML
EBV DNA SPEC NAA+PROBE-LOG#: 5.1 {LOG_COPIES}/ML
GLUCOSE BLDC GLUCOMTR-MCNC: 100 MG/DL (ref 70–99)
GRAM STAIN RESULT: NORMAL
GRAM STAIN RESULT: NORMAL
KOH PREPARATION: NORMAL
KOH PREPARATION: NORMAL
LYMPHOCYTES NFR FLD MANUAL: 12 %
MONOS+MACROS NFR FLD MANUAL: NORMAL %
NEUTS BAND NFR FLD MANUAL: NORMAL %
OTHER CELLS FLD MANUAL: 88 %
WBC # FLD AUTO: 63 /UL

## 2022-02-11 PROCEDURE — 82962 GLUCOSE BLOOD TEST: CPT

## 2022-02-11 PROCEDURE — 250N000009 HC RX 250: Performed by: STUDENT IN AN ORGANIZED HEALTH CARE EDUCATION/TRAINING PROGRAM

## 2022-02-11 PROCEDURE — 31630 BRONCHOSCOPY DILATE/FX REPR: CPT | Performed by: STUDENT IN AN ORGANIZED HEALTH CARE EDUCATION/TRAINING PROGRAM

## 2022-02-11 PROCEDURE — C1726 CATH, BAL DIL, NON-VASCULAR: HCPCS | Performed by: STUDENT IN AN ORGANIZED HEALTH CARE EDUCATION/TRAINING PROGRAM

## 2022-02-11 PROCEDURE — 258N000003 HC RX IP 258 OP 636: Performed by: STUDENT IN AN ORGANIZED HEALTH CARE EDUCATION/TRAINING PROGRAM

## 2022-02-11 PROCEDURE — 89051 BODY FLUID CELL COUNT: CPT | Performed by: STUDENT IN AN ORGANIZED HEALTH CARE EDUCATION/TRAINING PROGRAM

## 2022-02-11 PROCEDURE — 87206 SMEAR FLUORESCENT/ACID STAI: CPT | Performed by: STUDENT IN AN ORGANIZED HEALTH CARE EDUCATION/TRAINING PROGRAM

## 2022-02-11 PROCEDURE — 250N000011 HC RX IP 250 OP 636: Performed by: STUDENT IN AN ORGANIZED HEALTH CARE EDUCATION/TRAINING PROGRAM

## 2022-02-11 PROCEDURE — 87210 SMEAR WET MOUNT SALINE/INK: CPT | Performed by: STUDENT IN AN ORGANIZED HEALTH CARE EDUCATION/TRAINING PROGRAM

## 2022-02-11 PROCEDURE — 710N000012 HC RECOVERY PHASE 2, PER MINUTE: Performed by: STUDENT IN AN ORGANIZED HEALTH CARE EDUCATION/TRAINING PROGRAM

## 2022-02-11 PROCEDURE — 370N000017 HC ANESTHESIA TECHNICAL FEE, PER MIN: Performed by: STUDENT IN AN ORGANIZED HEALTH CARE EDUCATION/TRAINING PROGRAM

## 2022-02-11 PROCEDURE — 272N000001 HC OR GENERAL SUPPLY STERILE: Performed by: STUDENT IN AN ORGANIZED HEALTH CARE EDUCATION/TRAINING PROGRAM

## 2022-02-11 PROCEDURE — 87102 FUNGUS ISOLATION CULTURE: CPT | Performed by: STUDENT IN AN ORGANIZED HEALTH CARE EDUCATION/TRAINING PROGRAM

## 2022-02-11 PROCEDURE — 87081 CULTURE SCREEN ONLY: CPT | Performed by: STUDENT IN AN ORGANIZED HEALTH CARE EDUCATION/TRAINING PROGRAM

## 2022-02-11 PROCEDURE — 999N000141 HC STATISTIC PRE-PROCEDURE NURSING ASSESSMENT: Performed by: STUDENT IN AN ORGANIZED HEALTH CARE EDUCATION/TRAINING PROGRAM

## 2022-02-11 PROCEDURE — 250N000013 HC RX MED GY IP 250 OP 250 PS 637: Performed by: STUDENT IN AN ORGANIZED HEALTH CARE EDUCATION/TRAINING PROGRAM

## 2022-02-11 PROCEDURE — 99215 OFFICE O/P EST HI 40 MIN: CPT | Mod: 95 | Performed by: STUDENT IN AN ORGANIZED HEALTH CARE EDUCATION/TRAINING PROGRAM

## 2022-02-11 PROCEDURE — 710N000011 HC RECOVERY PHASE 1, LEVEL 3, PER MIN: Performed by: STUDENT IN AN ORGANIZED HEALTH CARE EDUCATION/TRAINING PROGRAM

## 2022-02-11 PROCEDURE — 87205 SMEAR GRAM STAIN: CPT | Performed by: STUDENT IN AN ORGANIZED HEALTH CARE EDUCATION/TRAINING PROGRAM

## 2022-02-11 PROCEDURE — 88312 SPECIAL STAINS GROUP 1: CPT | Mod: TC | Performed by: STUDENT IN AN ORGANIZED HEALTH CARE EDUCATION/TRAINING PROGRAM

## 2022-02-11 PROCEDURE — 360N000084 HC SURGERY LEVEL 4 W/ FLUORO, PER MIN: Performed by: STUDENT IN AN ORGANIZED HEALTH CARE EDUCATION/TRAINING PROGRAM

## 2022-02-11 PROCEDURE — G0463 HOSPITAL OUTPT CLINIC VISIT: HCPCS | Mod: PN,RTG | Performed by: STUDENT IN AN ORGANIZED HEALTH CARE EDUCATION/TRAINING PROGRAM

## 2022-02-11 RX ORDER — PROPOFOL 10 MG/ML
INJECTION, EMULSION INTRAVENOUS CONTINUOUS PRN
Status: DISCONTINUED | OUTPATIENT
Start: 2022-02-11 | End: 2022-02-11

## 2022-02-11 RX ORDER — LIDOCAINE 40 MG/G
CREAM TOPICAL
Status: DISCONTINUED | OUTPATIENT
Start: 2022-02-11 | End: 2022-02-11 | Stop reason: HOSPADM

## 2022-02-11 RX ORDER — SODIUM CHLORIDE, SODIUM LACTATE, POTASSIUM CHLORIDE, CALCIUM CHLORIDE 600; 310; 30; 20 MG/100ML; MG/100ML; MG/100ML; MG/100ML
INJECTION, SOLUTION INTRAVENOUS CONTINUOUS
Status: DISCONTINUED | OUTPATIENT
Start: 2022-02-11 | End: 2022-02-11 | Stop reason: HOSPADM

## 2022-02-11 RX ORDER — TACROLIMUS 0.5 MG/1
1 CAPSULE ORAL 2 TIMES DAILY
Qty: 120 CAPSULE | Refills: 11 | Status: SHIPPED | OUTPATIENT
Start: 2022-02-11 | End: 2022-04-15

## 2022-02-11 RX ORDER — METOPROLOL TARTRATE 1 MG/ML
1-2 INJECTION, SOLUTION INTRAVENOUS EVERY 5 MIN PRN
Status: DISCONTINUED | OUTPATIENT
Start: 2022-02-11 | End: 2022-02-11 | Stop reason: HOSPADM

## 2022-02-11 RX ORDER — OXYCODONE HYDROCHLORIDE 5 MG/1
5 TABLET ORAL EVERY 4 HOURS PRN
Status: DISCONTINUED | OUTPATIENT
Start: 2022-02-11 | End: 2022-02-11 | Stop reason: HOSPADM

## 2022-02-11 RX ORDER — ONDANSETRON 4 MG/1
4 TABLET, ORALLY DISINTEGRATING ORAL EVERY 30 MIN PRN
Status: DISCONTINUED | OUTPATIENT
Start: 2022-02-11 | End: 2022-02-11 | Stop reason: HOSPADM

## 2022-02-11 RX ORDER — ACETAMINOPHEN 325 MG/1
975 TABLET ORAL ONCE
Status: COMPLETED | OUTPATIENT
Start: 2022-02-11 | End: 2022-02-11

## 2022-02-11 RX ORDER — FENTANYL CITRATE 50 UG/ML
25 INJECTION, SOLUTION INTRAMUSCULAR; INTRAVENOUS
Status: DISCONTINUED | OUTPATIENT
Start: 2022-02-11 | End: 2022-02-11 | Stop reason: HOSPADM

## 2022-02-11 RX ORDER — HYDRALAZINE HYDROCHLORIDE 20 MG/ML
2.5-5 INJECTION INTRAMUSCULAR; INTRAVENOUS EVERY 10 MIN PRN
Status: DISCONTINUED | OUTPATIENT
Start: 2022-02-11 | End: 2022-02-11 | Stop reason: HOSPADM

## 2022-02-11 RX ORDER — FENTANYL CITRATE 50 UG/ML
INJECTION, SOLUTION INTRAMUSCULAR; INTRAVENOUS PRN
Status: DISCONTINUED | OUTPATIENT
Start: 2022-02-11 | End: 2022-02-11

## 2022-02-11 RX ORDER — HYDROMORPHONE HCL IN WATER/PF 6 MG/30 ML
0.2 PATIENT CONTROLLED ANALGESIA SYRINGE INTRAVENOUS EVERY 5 MIN PRN
Status: DISCONTINUED | OUTPATIENT
Start: 2022-02-11 | End: 2022-02-11 | Stop reason: HOSPADM

## 2022-02-11 RX ORDER — MEPERIDINE HYDROCHLORIDE 25 MG/ML
12.5 INJECTION INTRAMUSCULAR; INTRAVENOUS; SUBCUTANEOUS
Status: DISCONTINUED | OUTPATIENT
Start: 2022-02-11 | End: 2022-02-11 | Stop reason: HOSPADM

## 2022-02-11 RX ORDER — ONDANSETRON 2 MG/ML
INJECTION INTRAMUSCULAR; INTRAVENOUS PRN
Status: DISCONTINUED | OUTPATIENT
Start: 2022-02-11 | End: 2022-02-11

## 2022-02-11 RX ORDER — LIDOCAINE 40 MG/G
CREAM TOPICAL
Status: CANCELLED | OUTPATIENT
Start: 2022-02-11

## 2022-02-11 RX ORDER — ONDANSETRON 2 MG/ML
4 INJECTION INTRAMUSCULAR; INTRAVENOUS EVERY 30 MIN PRN
Status: DISCONTINUED | OUTPATIENT
Start: 2022-02-11 | End: 2022-02-11 | Stop reason: HOSPADM

## 2022-02-11 RX ORDER — PROPOFOL 10 MG/ML
INJECTION, EMULSION INTRAVENOUS PRN
Status: DISCONTINUED | OUTPATIENT
Start: 2022-02-11 | End: 2022-02-11

## 2022-02-11 RX ORDER — FENTANYL CITRATE 50 UG/ML
25 INJECTION, SOLUTION INTRAMUSCULAR; INTRAVENOUS EVERY 5 MIN PRN
Status: DISCONTINUED | OUTPATIENT
Start: 2022-02-11 | End: 2022-02-11 | Stop reason: HOSPADM

## 2022-02-11 RX ORDER — DEXAMETHASONE SODIUM PHOSPHATE 4 MG/ML
INJECTION, SOLUTION INTRA-ARTICULAR; INTRALESIONAL; INTRAMUSCULAR; INTRAVENOUS; SOFT TISSUE PRN
Status: DISCONTINUED | OUTPATIENT
Start: 2022-02-11 | End: 2022-02-11

## 2022-02-11 RX ORDER — LIDOCAINE HYDROCHLORIDE 20 MG/ML
INJECTION, SOLUTION INFILTRATION; PERINEURAL PRN
Status: DISCONTINUED | OUTPATIENT
Start: 2022-02-11 | End: 2022-02-11

## 2022-02-11 RX ADMIN — PROPOFOL 100 MG: 10 INJECTION, EMULSION INTRAVENOUS at 12:56

## 2022-02-11 RX ADMIN — ROCURONIUM BROMIDE 30 MG: 50 INJECTION, SOLUTION INTRAVENOUS at 12:56

## 2022-02-11 RX ADMIN — LIDOCAINE HYDROCHLORIDE 100 MG: 20 INJECTION, SOLUTION INFILTRATION; PERINEURAL at 12:55

## 2022-02-11 RX ADMIN — PROPOFOL 100 MG: 10 INJECTION, EMULSION INTRAVENOUS at 12:55

## 2022-02-11 RX ADMIN — DEXAMETHASONE SODIUM PHOSPHATE 4 MG: 4 INJECTION, SOLUTION INTRA-ARTICULAR; INTRALESIONAL; INTRAMUSCULAR; INTRAVENOUS; SOFT TISSUE at 13:10

## 2022-02-11 RX ADMIN — SODIUM CHLORIDE, POTASSIUM CHLORIDE, SODIUM LACTATE AND CALCIUM CHLORIDE: 600; 310; 30; 20 INJECTION, SOLUTION INTRAVENOUS at 12:46

## 2022-02-11 RX ADMIN — FENTANYL CITRATE 100 MCG: 50 INJECTION, SOLUTION INTRAMUSCULAR; INTRAVENOUS at 12:55

## 2022-02-11 RX ADMIN — PROPOFOL 150 MCG/KG/MIN: 10 INJECTION, EMULSION INTRAVENOUS at 13:01

## 2022-02-11 RX ADMIN — SUGAMMADEX 200 MG: 100 INJECTION, SOLUTION INTRAVENOUS at 13:34

## 2022-02-11 RX ADMIN — ONDANSETRON 4 MG: 2 INJECTION INTRAMUSCULAR; INTRAVENOUS at 13:34

## 2022-02-11 RX ADMIN — ACETAMINOPHEN 975 MG: 325 TABLET, FILM COATED ORAL at 10:53

## 2022-02-11 ASSESSMENT — MIFFLIN-ST. JEOR: SCORE: 1106.13

## 2022-02-11 NOTE — TELEPHONE ENCOUNTER
Kecia calls reporting she was cleared for kidney transplant from an ID perspective.  Also cleared from a pulm transplant perspective yesterday.  Will message the intake team regarding this.     Tacrolimus level 5.3 at 13 hours, on 2/10/22.  Goal 8-10.   Current dose 1 mg in AM, 0.5 mg in PM    Dose changed to 1 mg in AM, 1 mg in PM   Recheck level in 7-14 days.     Discussed with patient.

## 2022-02-11 NOTE — ANESTHESIA POSTPROCEDURE EVALUATION
Patient: Kecia Blue    Procedure: Procedure(s):  flexible, rigid bronchoscopy, tissue debulking, airway dilation, co2 laser, bronchoalveolar lavage       Diagnosis:Lung replaced by transplant (H) [Z94.2]  Diagnosis Additional Information: No value filed.    Anesthesia Type:  General    Note:  Disposition: Outpatient   Postop Pain Control: Uneventful            Sign Out: Well controlled pain   PONV: No   Neuro/Psych: Uneventful            Sign Out: Acceptable/Baseline neuro status   Airway/Respiratory: Uneventful            Sign Out: Acceptable/Baseline resp. status   CV/Hemodynamics: Uneventful            Sign Out: Acceptable CV status; No obvious hypovolemia; No obvious fluid overload   Other NRE: NONE   DID A NON-ROUTINE EVENT OCCUR? No           Last vitals:  Vitals Value Taken Time   /64 02/11/22 1415   Temp     Pulse 75 02/11/22 1420   Resp 16 02/11/22 1415   SpO2 93 % 02/11/22 1420   Vitals shown include unvalidated device data.    Electronically Signed By: Candice Bell MD  February 11, 2022  2:21 PM

## 2022-02-11 NOTE — ANESTHESIA PREPROCEDURE EVALUATION
Anesthesia Pre-Procedure Evaluation    Patient: Kecia Blue   MRN: [id number] : 1960        Preoperative Diagnosis: Lung replaced by transplant (H) [Z94.2]    Procedure : Procedure(s):  flexible, rigid bronchoscopy, tumor debulking possible stent co2 laser          Past Medical History:   Diagnosis Date     Acute on chronic respiratory failure with hypoxia (H) 2018     Anisocoria      Antisynthetase syndrome (H)      Anxiety      Aspergillus (H)      Aspergillus pneumonia (H) 2020     Benign essential hypertension      C. difficile colitis      Cytomegalovirus (CMV) viremia (H)      Dermatomyositis (H)      Dysplasia of cervix, low grade (ESTRADA 1)      EBV (Franklin-Barr virus) viremia      ESRD (end stage renal disease) on dialysis (H)      ILD (interstitial lung disease) (H)      Lung replaced by transplant (H)      Osteopenia      Paroxysmal atrial fibrillation (H)      Pneumocystis jiroveci pneumonia (H)      PONV (postoperative nausea and vomiting)      Post-menopause      Pulmonary hypertension (H)      Raynaud's disease      Seronegative rheumatoid arthritis (H)       Past Surgical History:   Procedure Laterality Date     BRONCHOSCOPY (RIGID OR FLEXIBLE), DIAGNOSTIC N/A 4/10/2018    Procedure: COMBINED BRONCHOSCOPY (RIGID OR FLEXIBLE), LAVAGE;;  Surgeon: Mariposa Donohue MD;  Location:  GI     BRONCHOSCOPY (RIGID OR FLEXIBLE), DIAGNOSTIC N/A 2020    Procedure: BRONCHOSCOPY, WITH BRONCHOALVEOLAR LAVAGE;  Surgeon: Uri Bass MD;  Location:  GI     BRONCHOSCOPY (RIGID OR FLEXIBLE), DILATE BRONCHUS / TRACHEA N/A 10/11/2018    Procedure: BRONCHOSCOPY (RIGID OR FLEXIBLE), DILATE BRONCHUS / TRACHEA;  Flexible And Rigid Bronchoscopy and Dilation;  Surgeon: Wilber Lin MD;  Location: [facility]     BRONCHOSCOPY FLEXIBLE N/A 3/13/2018    Procedure: BRONCHOSCOPY FLEXIBLE;  Flexible Bronchoscopy ;  Surgeon: Gissell Sanchez MD;  Location: [facility]     BRONCHOSCOPY  FLEXIBLE N/A 5/9/2018    Procedure: BRONCHOSCOPY FLEXIBLE;;  Surgeon: Wilber Lin MD;  Location: [facility]     BRONCHOSCOPY FLEXIBLE AND RIGID N/A 9/10/2018    Procedure: BRONCHOSCOPY FLEXIBLE AND RIGID;  Flexible and Rigid Bronchoscopy with Balloon Dilation, tissue debulking with cryo, and Right mainstem bronchus stent placement;  Surgeon: Wilber Lin MD;  Location: [facility]     BRONCHOSCOPY RIGID N/A 6/6/2018    Procedure: BRONCHOSCOPY RIGID;;  Surgeon: Lopez Macias MD;  Location: [facility]     BRONCHOSCOPY, DILATE BRONCHUS, STENT BRONCHUS, COMBINED N/A 6/11/2018    Procedure: COMBINED BRONCHOSCOPY, DILATE BRONCHUS, STENT BRONCHUS;  Flexible Bronchoscopy, Balloon Dilation, Bronchial Washings;  Surgeon: Wilber Lin MD;  [facility]     BRONCHOSCOPY, DILATE BRONCHUS, STENT BRONCHUS, COMBINED Right 7/10/2018    Procedure: COMBINED BRONCHOSCOPY, DILATE BRONCHUS, STENT BRONCHUS;  Flexible Bronchoscopy, Balloon Dilation, Bronchial Washings  ;  Surgeon: Wilber Lin MD;  [facility]     BRONCHOSCOPY, DILATE BRONCHUS, STENT BRONCHUS, COMBINED N/A 8/2/2018    Procedure: COMBINED BRONCHOSCOPY, DILATE BRONCHUS, STENT BRONCHUS;  Flexible Bronchoscopy, Bronchial Washings, Balloon Dilation;  Surgeon: Wilber Lin MD;  [facility]     BRONCHOSCOPY, DILATE BRONCHUS, STENT BRONCHUS, COMBINED N/A 8/20/2018    Procedure: COMBINED BRONCHOSCOPY, DILATE BRONCHUS, STENT BRONCHUS;  Flexible Bronchoscopy, Balloon Dilation;  Surgeon: Wilber Lin MD;  [facility]     BRONCHOSCOPY, DILATE BRONCHUS, STENT BRONCHUS, COMBINED N/A 10/29/2018    Procedure: Flexible Bronchoscopy, Balloon Dilation, Stent Revision, Airway Examination And Therapeutic Suctioning, Cyro Tumor Debulking;  Surgeon: Wilber Lin MD;  [facility]     BRONCHOSCOPY, DILATE BRONCHUS, STENT BRONCHUS, COMBINED N/A 11/20/2018    Procedure: Rigid Bronchoscopy, Stent Removal and  dilitation;  Surgeon: Wilber Lin MD;  [facility]     BRONCHOSCOPY, DILATE BRONCHUS, STENT BRONCHUS, COMBINED N/A 12/14/2018    Procedure: Flexible And Rigid Bronchoscopy, Balloon Dilation, Bronchial Washing;  Surgeon: Wilber Lin MD;  [facility]     BRONCHOSCOPY, DILATE BRONCHUS, STENT BRONCHUS, COMBINED N/A 1/17/2019    Procedure: Flexible And Rigid Bronchoscopy, Balloon Dilation.;  Surgeon: Wilber Lin MD;  [facility]     BRONCHOSCOPY, DILATE BRONCHUS, STENT BRONCHUS, COMBINED N/A 3/7/2019    Procedure: Flexible and Rigid Bronchoscopy, Bronchial Washing, Balloon Dilation;  Surgeon: Wilber Lin MD;  [facility]     BRONCHOSCOPY, DILATE BRONCHUS, STENT BRONCHUS, COMBINED N/A 6/6/2019    Procedure: Rigid and Flexible Bronchoscopy, Balloon Dilation;  Surgeon: Wilber Lin MD;  [facility]     BRONCHOSCOPY, DILATE BRONCHUS, STENT BRONCHUS, COMBINED N/A 10/11/2019    Procedure: Flexible and Rigid Bronchoscopy, Balloon Dilation, Bronchoalveolar Lagave;  Surgeon: Wilber Lin MD;  [facility]     BRONCHOSCOPY, DILATE BRONCHUS, STENT BRONCHUS, COMBINED N/A 2/19/2021    Procedure: BRONCHOSCOPY, flexible, airway dilation, and bronchial wash;  Surgeon: Wilber Lin MD;  [facility]     BRONCHOSCOPY, DILATE BRONCHUS, STENT BRONCHUS, COMBINED N/A 4/9/2021    Procedure: BRONCHOSCOPY, flexible and rigid, Airway suctioning;  Surgeon: Mati Norris MD;  Location:  OR     CV RIGHT HEART CATH MEASUREMENTS RECORDED N/A 3/10/2020    Procedure: CV RIGHT HEART CATH;  Surgeon: Wai Garcia MD;  Location:  HEART CARDIAC CATH LAB     ENT SURGERY      tonsillectomy as a child     ESOPHAGOSCOPY, GASTROSCOPY, DUODENOSCOPY (EGD), COMBINED N/A 10/29/2018    Procedure: COMBINED ESOPHAGOSCOPY, GASTROSCOPY, DUODENOSCOPY (EGD) with biopsies and polypectomy;  Surgeon: Chente Bloom MD;  [facility]     INSERT  EXTRACORPORAL MEMBRANE OXYGENATOR ADULT (OUTSIDE OR) N/A 2/27/2018    Procedure: INSERT EXTRACORPORAL MEMBRANE OXYGENATOR ADULT (OUTSIDE OR);  INSERT EXTRACORPORAL MEMBRANE OXYGENATOR ADULT (OUTSIDE OR) ;  Surgeon: Toby Hernandez MD;  Location: [facility]     IR CVC TUNNEL PLACEMENT > 5 YRS OF AGE  10/25/2019     IR DIALYSIS FISTULOGRAM LEFT  3/2/2021     IR DIALYSIS MECH THROMB, PTA  3/2/2021     IR FLUORO 0-1 HOUR  5/7/2021     IR GASTRO JEJUNOSTOMY TUBE PLACEMENT  2/16/2021     IR PARACENTESIS  1/8/2020     IR THORACENTESIS  9/13/2019     IR TRANSCATHETER BIOPSY  1/8/2020     LASER CO2 BRONCHOSCOPY N/A 4/30/2021    Procedure: Flexible and Rigid Bronchoscopy and Tissue Debulking with CO2 Laser Assistance;  Surgeon: Mati Norris MD;  Location: [facility]     LASER CO2 BRONCHOSCOPY N/A 6/11/2021    Procedure: BRONCHOSCOPY, Flexible and Rigid Bronchoscopy, Tissue Debulking with cryo Assistance, airway dilation,;  Surgeon: Mati Norris MD;  [facility]     LASER CO2 BRONCHOSCOPY N/A 9/16/2021    Procedure: BRONCHOSCOPY, flexible and rigid, CO2 Laser Debulking;  Surgeon: Mati Norris MD;  Location: [facility]     no prior surgery       REMOVE EXTRACORPORAL MEMBRANE OXYGENATOR ADULT N/A 3/3/2018    Procedure: REMOVE EXTRACORPORAL MEMBRANE OXYGENATOR ADULT;  Removal of Right Femoral Venous and Right Axillary Arterial Extracorporeal Membrane Oxygenator;  Surgeon: Toby Hernandez MD;  Location: [facility]     TRANSPLANT LUNG RECIPIENT SINGLE X2 Bilateral 3/1/2018    Procedure: TRANSPLANT LUNG RECIPIENT SINGLE X2;  Median Sternotomy, Extracorporeal Membrane Oxygenator, bilateral sequential lung transplant;  Surgeon: Toby Hernandez MD;  Location: [facility]      No Known Allergies   Social History     Tobacco Use     Smoking status: Never Smoker     Smokeless tobacco: Never Used   Substance Use Topics     Alcohol use: No     Alcohol/week: 1.0 standard drink     Types: 1 Glasses of wine per week       Wt Readings from Last 1 Encounters:   10/12/21 54.8 kg (120 lb 11.5 oz)        Anesthesia Evaluation   Pt has had prior anesthetic. Type: MAC and General.    History of anesthetic complications  - PONV.      ROS/MED HX  ENT/Pulmonary: Comment: Pt s/p bilateral lung transplant for interstitial lung disease, R bronchial mainstem stenosis requiring dilation/stent,   - PFTs (2/10): at patient baseline (FEV1/FVC 79% pred)        Neurologic:  - neg neurologic ROS     Cardiovascular: Comment: H/o DVT, paroxysmal Afib, SVT, now SR, pulm HTN, EF 60-65%    (+) hypertension-----pulmonary hypertension, Previous cardiac testing   Echo: Date: 1/25/2022 Results:  - LVEF 60-65%  - RV wnl  - No significant valvular disease  Stress Test: Date: Results:    ECG Reviewed: Date: 9/20/21 Results:  SR, 78 bpm  - moderate voltage criteria LVH  Cath:  Date: 3/10/2020 Results:    Right sided filling pressures are mildly elevated.    Mild elevated Pulmonary Hypertension.    Left sided filling pressures are mildly elevated.    Normal cardiac output level.        METS/Exercise Tolerance:     Hematologic:  - neg hematologic  ROS     Musculoskeletal: Comment: Raynaud's, RA, antisynthetase syndrome  - Left knee pain      GI/Hepatic: Comment: - portal HTN, ascites w/ hx pf paracentesis, follows with hepatology      Renal/Genitourinary:     (+) renal disease (HD MWF, Left-sided fistula), type: ESRD, Pt requires dialysis, type: Hemodialysis,     Endo:  - neg endo ROS     Psychiatric/Substance Use:     (+) psychiatric history anxiety     Infectious Disease:  - neg infectious disease ROS     Malignancy:  - neg malignancy ROS     Other:            Physical Exam    Airway        Mallampati: II   TM distance: > 3 FB   Neck ROM: full   Mouth opening: > 3 cm    Respiratory Devices and Support         Dental       (+) chipped    B=Bridge, C=Chipped, L=Loose, M=Missing    Cardiovascular          Rhythm and rate: regular and normal     Pulmonary   pulmonary  exam normal        breath sounds clear to auscultation           OUTSIDE LABS:  CBC:   Lab Results   Component Value Date    WBC 7.4 02/10/2022    WBC 13.9 (H) 09/23/2021    HGB 11.9 02/10/2022    HGB 10.4 (L) 09/23/2021    HCT 37.0 02/10/2022    HCT 33.0 (L) 09/23/2021     02/10/2022     09/23/2021     BMP:   Lab Results   Component Value Date     02/10/2022     10/12/2021    POTASSIUM 4.3 02/10/2022    POTASSIUM 4.0 10/12/2021    CHLORIDE 94 02/10/2022    CHLORIDE 102 10/12/2021    CO2 30 02/10/2022    CO2 29 10/12/2021    BUN 44 (H) 02/10/2022    BUN 31 (H) 10/12/2021    CR 4.27 (H) 02/10/2022    CR 3.85 (H) 10/12/2021     (H) 02/10/2022     (H) 10/12/2021     COAGS:   Lab Results   Component Value Date    PTT 28 02/12/2021    INR 0.96 09/15/2021    FIBR 358 02/11/2021     POC:   Lab Results   Component Value Date    BGM 75 06/11/2021    HCG Negative 06/06/2019     HEPATIC:   Lab Results   Component Value Date    ALBUMIN 2.8 (L) 10/12/2021    PROTTOTAL 7.8 10/12/2021    ALT 52 (H) 10/12/2021    AST 24 10/12/2021     (H) 11/11/2019    ALKPHOS 316 (H) 10/12/2021    BILITOTAL 0.8 10/12/2021    SALAS <10 (L) 01/24/2021     OTHER:   Lab Results   Component Value Date    PH 7.41 02/10/2021    LACT 1.5 09/23/2021    A1C 5.8 (H) 09/15/2021    CHELSEY 9.6 02/10/2022    PHOS 5.9 (H) 05/03/2021    MAG 1.8 02/10/2022    LIPASE 212 10/24/2019    AMYLASE 58 02/19/2018    TSH 1.80 08/01/2018    CRP 25.0 (H) 10/06/2019    SED 93 (H) 10/07/2019       Anesthesia Plan    ASA Status:  3      Anesthesia Type: General.   Induction: Intravenous.   Maintenance: TIVA.   Techniques and Equipment:     - Airway: Jet ventilation (Monsoon ventilation)     - Lines/Monitors: BIS     Consents    Anesthesia Plan(s) and associated risks, benefits, and realistic alternatives discussed. Questions answered and patient/representative(s) expressed understanding.    - Discussed:     - Discussed with:  Patient       - Extended Intubation/Ventilatory Support Discussed: No.      - Patient is DNR/DNI Status: No    Use of blood products discussed: No .     Postoperative Care    Pain management: Oral pain medications.   PONV prophylaxis: Ondansetron (or other 5HT-3), Background Propofol Infusion, Dexamethasone or Solumedrol     Comments:                Chapin Mcdaniel MD

## 2022-02-11 NOTE — DISCHARGE INSTRUCTIONS
Same-Day Surgery   Adult Discharge Orders & Instructions     For 24 hours after surgery:  1. Get plenty of rest.  A responsible adult must stay with you for at least 24 hours after you leave the hospital.   2. Pain medication can slow your reflexes. Do not drive or use heavy equipment.  If you have weakness or tingling, don't drive or use heavy equipment until this feeling goes away.  3. Mixing alcohol and pain medication can cause dizziness and slow your breathing. It can even be fatal. Do not drink alcohol while taking pain medication.  4. Avoid strenuous or risky activities.  Ask for help when climbing stairs.   5. You may feel lightheaded.  If so, sit for a few minutes before standing.  Have someone help you get up.   6. If you have nausea (feel sick to your stomach), drink only clear liquids such as apple juice, ginger ale, broth or 7-Up.  Rest may also help.  Be sure to drink enough fluids.  Move to a regular diet as you feel able. Take pain medications with a small amount of solid food, such as toast or crackers, to avoid nausea.   7. A slight fever is normal. Call the doctor if your fever is over 100 F (37.7 C) (taken under the tongue) or lasts longer than 24 hours.  8. You may have a dry mouth, muscle aches, trouble sleeping or a sore throat.  These symptoms should go away after 24 hours.  9. Do not make important or legal decisions.   Pain Management:      1. Take pain medication (if prescribed) for pain as directed by your physician.        2. WARNING: If the pain medication you have been prescribed contains Tylenol  (acetaminophen), DO NOT take additional doses of Tylenol (acetaminophen).     Call your doctor for any of the followin.  Signs of infection (fever, growing tenderness at the surgery site, severe pain, a large amount of drainage or bleeding, foul-smelling drainage, redness, swelling).    2.  It has been over 8 to 10 hours since surgery and you are still not able to urinate (pee).    3.   Headache for over 24 hours.    4.  Numbness, tingling or weakness the day after surgery (if you had spinal anesthesia).  To contact a doctor, call _____________________________________ or:      [phone] and ask for the Resident On Call for:          __________________________________________ (answered 24 hours a day)                 Rev. 10/2014 Post-Bronchoscopy Patient Instructions:    February 11, 2022  Kecia Blue      Your procedure (bronchoscopy with tissue debulking and bronchoalveolar lavage   ) was completed without any immediate complications.      You may cough up scant amount of blood for the next 12-24 hours. If you have excessive cough with blood, chest pain, shortness of breath or other concerning symptoms, please report to the closest emergency room.      You may experience low grade (less than 100.5 F) fever next 24 hours for which you can take Tylenol. If the fever persists more than 24 hours contact our office or your primary care provider.      Our office (Pulmonary--[phone])      You  resume your regular diet as it was prior to procedure.      Should you have any question, please do not hesitate to call our office.    Belkys Rios MD

## 2022-02-11 NOTE — LETTER
2/11/2022       RE: Kecia Blue  [address] [place] [address]     Dear Colleague,    Thank you for referring your patient, Kecia Blue, to the [facility] INFECTIOUS DISEASE CLINIC [place] at [facility]. Please see a copy of my visit note below.    Kecia is a 61 year old who is being evaluated via a billable video visit.      How would you like to obtain your AVS? MyChart  If the video visit is dropped, the invitation should be resent by: Text to cell phone: [phone]  Will anyone else be joining your video visit? No      Video Start Time: 3.14 pm   Video-Visit Details    Type of service:  Video Visit    Video End Time:3:23 PM    Originating Location (pt. Location): hospital, had outpatient bronch and is in recovery     Distant Location (provider location):  [facility] INFECTIOUS DISEASE [facility]     Platform used for Video Visit: Dakim     Total time including chart review, care-coordination and documentation time on the date of encounter - 40 mins    _______________________________________________________________________________________________________  [facility]  Transplant Infectious Disease Progress Note     Patient:  Kecia Blue, Date of birth 1960, Medical record number [id number]  Date of Visit:  02/11/2022            Assessment and Recommendations:   Recommendations:  --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 This will most likely be for life   - periodic posaconazole trough levels - every 3-6 months - await one from yesterday   - elevated alk phos (twice normal) with normal AST/ALT  --Cont bactrim for PCP secondary prophylaxis.This will be for life  - Check EBV VL every 6 months. Currently at 150k  - with respect to Pursuing a kidney transplant - she is at risk for worsening aspergillus infection with heightened immune suppression  soon after kidney transplant especially in lieu of liver issues. However it is hard to quantify that risk. There has been no recurrent pleural effusion since 9/2021. She is on posaconazole. She is willing to take the risk. Therefore can proceed with kidney transplant from an ID standpoint. Communicated with lung transplant team.   - recommend flu vaccine, otherwise up to date with vaccinations.     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 s/p drainage and vori (S to both vori and posa) in 2019, Calcified mass in left pleural cavity s/p biopsy Oct 2020 with aspergillus on cx and path (S to both vori and posa) vori changed to posaconazole. S/p left pleural effusion drainage April 2021 - exudative, fungal elements seen on KOH but fungal cx neg. Posa levels good and no recurrence of pleural effusion in Sep 2021 CT chest.     I suspect the left pleural effusion is reactive to the calcified aspergillus elements there along with fluid disturbance related to dialysis. I do not think this is failure of anti-fungal therapy or recurrence of Aspergillus infection. It seems Dr. Layton has had discussions with surgery for source control (removal of calcified mass), however, there is  concern for high morbidity with surgery and therefore it was decided to pursue long term posaconazole.     EBV viremia:  elevated to 960k in 10/2021. Suspect due to recent hospitalization, health stress. Decreased to 135k 2/2022. Ok to monitor every 6 months.     Candidacy for kidney transplant: This is a very difficult call. She is at risk for aspergillus infection worsening with heightened immune suppression soon after kidney transplant but its hard to know how much of a risk this will be. Also people with liver disease have worse fungal infection severity. Patient is ok with taking this risk. Ok to proceed with transplant from an ID standpoint.                Interval History:     Last seen 10/15/21  Continues to be on posaconazole 300 mg po daily, tolerating it well.   On bactrim prophylaxis  posa trough level pending from yesterday   EBV level has decreased from 900k to 150k.  Lung team has oked her for kidney transplant.   Had a routine bronch today. Is in recovery now before heading home.   Has Elevated alk phos (two times normal) with normal AST/ALT - chronic.       HPI: 10/2021   Patient is a 60 yo female with PMHx significant for ILD, seronegative rheumatoid arthritis and dermatomyositis s/p B/L lung transplantation 3/1/2018 (CMV D+/R+, EBV D+/R+). 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 Underwent CT guided biopsy on 10/8/19 which yielded purulent fluid--cx positive for aspergillus fumigatus. She underwent bronchoscopy with BAL on 10/11/19 which showed septate hyphae.  Notably, she had also grown Actinomyces from multiple BAL specimens in the past. She was started on voriconazole  at the time.      Patient subsequently underwent repaet CT chest on 7/18/20 at OSH and was noted to have interval increased hypodense mass like region in medial aspect of left lower lung. As a result, patient underwent CT guided lung tissue biopsy. Histopath showed acute inflammation, necrosis and myofibroblastic proliferation with focal fungal hyphae highlighted by GMS.  Cultures positive for Aspergillus fumigatus. Voriconazole was changed to posaconazole at that time     1/5/21- BAL Cx with stenotrophomonas maltophilia-treated with ceftazidime x 2 weeks     1/24/21-2/25/21 hospitalized with AHRF requiring intubation 2/2 PCP pneumonia/ARDS(bactrim prophylaxis had been stopped due to side effects) with subsequent trach on 2/12/21. She was discharged to the acute rehab and subsequently went home on 3/5/21.     In April 2021 she was found to have a left pleural effusion and hence had it drained with a chest tube x 1 week- ampho beads were placed. Pleurodesis was planned but abandoned due to fungal elements on stain. Fluid was exudative, fungal elements were seen but fungal cx was negative including other cxs. Serum Asp GM qas neg, serum BDG continued to be positive. She had a follow up CT chest in Sep which did not show a recurrence of the left pleural effusion.     She continues on posaconazole 300 mg daily and levels have been good and she is tolerating it well. She has been on dialysis for the past 2 years and is wondering about a kidney transplant.     She also has a history of liver dysfunction with alk phos elevation, ascites and portal HTN, followed by hepatology, suggestive of congestive hepatopathy with moderate zone 3 pericellular fibrosis. The ascites improved with dialysis. She reports that it has been a long time since she had a paracentesis.     Recent admission in Sep 2021 for most likely fluid overload vs HCAP. Oct 2021 EBV elevated to 969k from 45k in Sep 2021.     Results for SARAI KILLIAN (MRN  [id number]) as of 10/15/2021 21:46   Ref. Range 9/15/2021 09:15 10/12/2021 12:51   EBV DNA Copies/mL Latest Ref Range: <=0 copies/mL 45,122 (H) 969,411 (H)     Results for SARAI KILLIAN (MRN [id number]) as of 10/15/2021 21:46   Ref. Range 12/9/2020 07:21 1/25/2021 12:59 2/22/2021 05:27 5/1/2021 06:54   EBV DNA Copies/mL Latest Ref Range: EBVNEG^EBV DNA Not Detected Copies/mL 10,686 (A) 5,619 (A) 75,709 (A) 38,186 (A)              Current Medications & Allergies:   Reviewed    No Known Allergies         Physical Exam:     Unable to examine due to virtual visit          Laboratory Data:     Inflammatory Markers    Recent Labs   Lab Test 10/07/19  1221 10/06/19  1444 09/13/19  0934 09/11/19  2325 08/22/19  0820   SED 93*  --  111* 102*  --    CRP  --  25.0* 58.0* 66.0* 47.0*       Immune Globulin Studies     Recent Labs   Lab Test 09/15/21  0915 01/31/21  0441 01/25/21  0406 10/27/19  0621 03/01/18  0356 02/19/18  0759   IGG 1,198 763  --  936 698 1,790*   IGM  --   --   --   --   --  502*   IGE  --   --  <2  --   --  <2   IGA  --   --   --   --   --  425*   IGG1  --   --   --   --   --  1,300*   IGG2  --   --   --   --   --  131*   IGG3  --   --   --   --   --  101   IGG4  --   --   --   --   --  1*       Metabolic Studies       Recent Labs   Lab Test 02/11/22  1032 02/10/22  0919 10/12/21  1251 09/23/21  1144 09/23/21  0628 09/22/21  1008 09/20/21  2128 09/20/21  1903 09/16/21  0749 09/15/21  0915 05/05/21  0707 05/03/21  0640 10/22/19  1314 10/21/19  1752 08/13/19  0000 08/07/19  0959   NA  --  134 135  --    < > 134   < > 132*  --  136   < > 132*   < > 133   < > 133   POTASSIUM  --  4.3 4.0  --    < > 3.5   < > 5.3   < > 4.8   < > 5.5*   < > 5.0   < > 4.2   CHLORIDE  --  94 102  --    < > 103   < > 101  --  98   < > 100   < > 109   < > 98   CO2  --  30 29  --    < > 22   < > 23  --  26   < > 23   < > 10*   < > 29   ANIONGAP  --  10 4  --    < > 9   < > 8  --  12   < > 9   < > 13   < > 6   BUN  --  44* 31*  --     < > 51*   < > 90*  --  79*   < > 85*   < > 66*   < > 27   CR  --  4.27* 3.85*  --    < > 4.46*   < > 5.85*  --  5.63*   < > 4.55*   < > 5.76*   < > 1.80*   GFRESTIMATED  --  11* 12*  --    < > 10*   < > 7*  --  8*   < > 10*   < > 8*   < > 31*   * 157* 108*  --    < > 192*   < > 98   < > 152*   < > 129*   < > 138*   < > 119*   A1C  --   --   --   --   --   --   --   --   --  5.8*  --   --    < >  --   --   --    CHELSEY  --  9.6 8.5  --    < > 8.0*   < > 8.5  --  8.6   < > 7.8*   < > 8.0*   < > 9.8   PHOS  --   --   --   --   --   --   --   --   --   --   --  5.9*   < > 6.7*   < >  --    MAG  --  1.8 1.8  --    < >  --   --   --   --   --   --   --    < > 2.0   < > 2.2   LACT  --   --   --  1.5  --  0.7   < >  --   --   --   --   --    < >  --    < >  --    PCAL  --   --   --   --   --   --   --  0.21  --   --   --   --    < >  --    < >  --    FGTL  --   --   --   --   --   --   --   --   --  210  --   --    < >  --    < >  --    CKT  --   --   --   --   --   --   --   --   --   --   --   --   --  70  --  36    < > = values in this interval not displayed.       Hepatic Studies    Recent Labs   Lab Test 10/12/21  1251 09/21/21  1055 09/20/21  1903 09/15/21  0915 05/01/21  0654 01/27/21  0414 01/26/21  0425   BILITOTAL 0.8 1.2 1.1   < >  --    < > 0.8   DBIL 0.4*  --   --    < >  --    < > 0.6*   ALKPHOS 316* 324* 321*   < >  --    < > 184*   PROTTOTAL 7.8 6.7* 7.4   < > 7.2   < > 6.0*  6.0*   ALBUMIN 2.8* 2.6* 3.0*   < >  --    < > 2.0*   AST 24 22 22   < >  --    < > 11   ALT 52* 80* 98*   < >  --    < > 30   LDH  --   --   --   --  164  --  187    < > = values in this interval not displayed.     Hematology Studies      Recent Labs   Lab Test 02/10/22  0920 09/23/21  0628 09/22/21  0610 09/21/21  1055 09/20/21  1903 09/15/21  0915 04/29/21  1020 04/08/21  0722 02/27/21  0853 02/25/21  0542   WBC 7.4 13.9* 12.2* 11.5* 9.9 8.3   < > 6.3   < > 2.9*   ANEU  --   --   --   --   --   --   --  4.8  --  1.9   ALYM  --    --   --   --   --   --   --  0.7*  --  0.6*   SHERRI  --   --   --   --   --   --   --  0.8  --  0.4   AEOS  --   --   --   --   --   --   --  0.1  --  0.1   HGB 11.9 10.4* 10.7* 10.3* 11.3* 11.0*   < > 9.7*   < > 9.9*   HCT 37.0 33.0* 33.3* 31.9* 36.1 35.1   < > 29.9*   < > 32.0*    165 200 218 228 213   < > 178   < > 293    < > = values in this interval not displayed.       posaconazole level ok 9/2021    Microbiology:  CMV 1/2022  Neg     Results for SARAI KILLIAN (MRN [id number]) as of 2/11/2022 20:39   Ref. Range 9/15/2021 09:15 10/12/2021 12:51 2/10/2022 09:20   EBV DNA Copies/mL Latest Ref Range: <=0 copies/mL 45,122 (H) 969,411 (H) 135,117 (H)       5/2021 pleural fluid KOH - pos fungal elements, fungal cx neg    10/2019 pleural fluid fungal cx   Aspergillus fumigatus    E-TEST   Amphotericin B 0.5 ug/mL No interpretation available    Comment: See comment... 1     Itraconazole 0.12 ug/mL No interpretation available    Micafungin <=0.06 ug/mL No interpretation available    Posaconazole <=0.06 ug/ml No interpretation available    Voriconazole 0.5 ug/mL No interpretation available      Lung tissue Fungal cx aug 2020    Aspergillus fumigatus     FUNGAL YEAST JAYME     Amphotericin B 1.0 ug/mL No interpretation available     Comment: See comment... 1      Itraconazole 0.25 ug/mL No interpretation available     Micafungin <=0.03 ug/mL No interpretation available     Posaconazole 0.12 ug/ml No interpretation available     Voriconazole 0.5 ug/mL No interpretation available      Imaging: personally reviewed   CXR 2/10/22  IMPRESSION:  1. Decreased retrocardiac opacities, likely representing  fibroatelectasis. Cannot exclude infectious component.  2. No new focal opacities, pleural effusion, or pneumothorax.  3. Mild bowel loop dilation in the limited upper abdomen.    CT chest 9/15/21  IMPRESSION: Small old partially calcified empyema paravertebral left  lower lung. Removal of previous thoracostomy tube.  Unchanged  osteopenic T5 compression deformity. Bilateral lung transplantation.  Increased opacities in right lower lung which may indicate worsening  inflammation rather than edema. Continued right-sided bronchial  anastomotic narrowing, unchanged from just over 4 months ago.  Cholelithiasis. Aortic atherosclerosis.    CT chest 5/4/21  1. Decreased size of the peripherally calcified chronic empyema within  the medial left lower lobe with left sided chest tube in place.  Retained amphotericin bead is within the collection.  2. Cardiomegaly with basilar predominance groundglass opacities and  interlobular septal thickening, compatible with pulmonary edema.  3. Postsurgical changes of bilateral lung transplant with unchanged  mild right bronchial anastomotic narrowing.    CT chest 4/8/21  1. Stable partially calcified pleural cystic structure/collection  along the medial right lower lobe, consistent with chronic  empyema/sequela of prior infection. Trace bilateral pleural effusions.  2. Cardiomegaly with basilar predominant patchy groundglass opacities  with diffuse interlobular septal thickening, consistent with pulmonary  edema.  3. Tree-in-bud nodular opacities predominantly involving the lingula,  concerning for infection.  4. Stable postsurgical changes of bilateral lung transplantation.  5. Cholelithiasis.  6. Splenomegaly.

## 2022-02-11 NOTE — PROGRESS NOTES
Kecia is a 61 year old who is being evaluated via a billable video visit.      How would you like to obtain your AVS? MyChart  If the video visit is dropped, the invitation should be resent by: Text to cell phone: [phone]  Will anyone else be joining your video visit? No      Video Start Time: 3.14 pm   Video-Visit Details    Type of service:  Video Visit    Video End Time:3:23 PM    Originating Location (pt. Location): hospital, had outpatient bronch and is in recovery     Distant Location (provider location):  [facility] [place]     Platform used for Video Visit: [employer]     Total time including chart review, care-coordination and documentation time on the date of encounter - 40 mins    _______________________________________________________________________________________________________  [facility]  Transplant Infectious Disease Progress Note     Patient:  Kecia Blue, Date of birth 1960, Medical record number [id number]  Date of Visit:  02/11/2022            Assessment and Recommendations:   Recommendations:  --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 This will most likely be for life   - periodic posaconazole trough levels - every 3-6 months - await one from yesterday   - elevated alk phos (twice normal) with normal AST/ALT  --Cont bactrim for PCP secondary prophylaxis.This will be for life  - Check EBV VL every 6 months. Currently at 150k  - with respect to Pursuing a kidney transplant - she is at risk for worsening aspergillus infection with heightened immune suppression soon after kidney transplant especially in lieu of liver issues. However it is hard to quantify that risk. There has been no recurrent pleural effusion since 9/2021. She is on posaconazole. She is willing to take the risk. Therefore can proceed with kidney transplant from an ID standpoint. Communicated with lung transplant team.   -  recommend flu vaccine, otherwise up to date with vaccinations.     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 s/p drainage and vori (S to both vori and posa) in 2019, Calcified mass in left pleural cavity s/p biopsy Oct 2020 with aspergillus on cx and path (S to both vori and posa) vori changed to posaconazole. S/p left pleural effusion drainage April 2021 - exudative, fungal elements seen on KOH but fungal cx neg. Posa levels good and no recurrence of pleural effusion in Sep 2021 CT chest.     I suspect the left pleural effusion is reactive to the calcified aspergillus elements there along with fluid disturbance related to dialysis. I do not think this is failure of anti-fungal therapy or recurrence of Aspergillus infection. It seems Dr. Layton has had discussions with surgery for source control (removal of calcified mass), however, there is  concern for high morbidity with surgery and therefore it was decided to pursue long term posaconazole.     EBV viremia: elevated to 960k in 10/2021. Suspect due to recent hospitalization, health stress. Decreased to 135k 2/2022. Ok to monitor every 6 months.     Candidacy for kidney transplant: This is a very difficult call. She is at risk for aspergillus infection worsening with heightened immune suppression soon after kidney transplant but its hard to know  how much of a risk this will be. Also people with liver disease have worse fungal infection severity. Patient is ok with taking this risk. Ok to proceed with transplant from an ID standpoint.                Interval History:     Last seen 10/15/21  Continues to be on posaconazole 300 mg po daily, tolerating it well.   On bactrim prophylaxis  posa trough level pending from yesterday   EBV level has decreased from 900k to 150k.  Lung team has oked her for kidney transplant.   Had a routine bronch today. Is in recovery now before heading home.   Has Elevated alk phos (two times normal) with normal AST/ALT - chronic.       HPI: 10/2021   Patient is a 58 yo female with PMHx significant for ILD, seronegative rheumatoid arthritis and dermatomyositis s/p B/L lung transplantation 3/1/2018 (CMV D+/R+, EBV D+/R+). 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 Underwent CT guided biopsy on 10/8/19 which yielded purulent fluid--cx positive for aspergillus fumigatus. She underwent bronchoscopy with BAL on 10/11/19 which showed septate hyphae.  Notably, she had also grown Actinomyces from multiple BAL specimens in the past. She was started on voriconazole at the time.      Patient subsequently underwent repaet CT chest on 7/18/20 at OSH and was noted to have interval increased hypodense mass like region in medial aspect of left lower lung. As a result, patient underwent CT guided lung tissue biopsy. Histopath showed acute inflammation, necrosis and myofibroblastic proliferation with focal  fungal hyphae highlighted by GMS.  Cultures positive for Aspergillus fumigatus. Voriconazole was changed to posaconazole at that time     1/5/21- BAL Cx with stenotrophomonas maltophilia-treated with ceftazidime x 2 weeks     1/24/21-2/25/21 hospitalized with AHRF requiring intubation 2/2 PCP pneumonia/ARDS(bactrim prophylaxis had been stopped due to side effects) with subsequent trach on 2/12/21. She was discharged to the acute rehab and subsequently went home on 3/5/21.     In April 2021 she was found to have a left pleural effusion and hence had it drained with a chest tube x 1 week- ampho beads were placed. Pleurodesis was planned but abandoned due to fungal elements on stain. Fluid was exudative, fungal elements were seen but fungal cx was negative including other cxs. Serum Asp GM qas neg, serum BDG continued to be positive. She had a follow up CT chest in Sep which did not show a recurrence of the left pleural effusion.     She continues on posaconazole 300 mg daily and levels have been good and she is tolerating it well. She has been on dialysis for the past 2 years and is wondering about a kidney transplant.     She also has a history of liver dysfunction with alk phos elevation, ascites and portal HTN, followed by hepatology, suggestive of congestive hepatopathy with moderate zone 3 pericellular fibrosis. The ascites improved with dialysis. She reports that it has been a long time since she had a paracentesis.     Recent admission in Sep 2021 for most likely fluid overload vs HCAP. Oct 2021 EBV elevated to 969k from 45k in Sep 2021.     Results for SARAI KILLIAN (MRN [id number]) as of 10/15/2021 21:46   Ref. Range 9/15/2021 09:15 10/12/2021 12:51   EBV DNA Copies/mL Latest Ref Range: <=0 copies/mL 45,122 (H) 969,411 (H)     Results for SARAI KILLIAN (MRN [id number]) as of 10/15/2021 21:46   Ref. Range 12/9/2020 07:21 1/25/2021 12:59 2/22/2021 05:27 5/1/2021 06:54   EBV DNA Copies/mL Latest Ref  Range: EBVNEG^EBV DNA Not Detected Copies/mL 10,686 (A) 5,619 (A) 75,709 (A) 38,186 (A)              Current Medications & Allergies:   Reviewed    No Known Allergies         Physical Exam:     Unable to examine due to virtual visit          Laboratory Data:     Inflammatory Markers    Recent Labs   Lab Test 10/07/19  1221 10/06/19  1444 09/13/19  0934 09/11/19  2325 08/22/19  0820   SED 93*  --  111* 102*  --    CRP  --  25.0* 58.0* 66.0* 47.0*       Immune Globulin Studies     Recent Labs   Lab Test 09/15/21  0915 01/31/21  0441 01/25/21  0406 10/27/19  0621 03/01/18  0356 02/19/18  0759   IGG 1,198 763  --  936 698 1,790*   IGM  --   --   --   --   --  502*   IGE  --   --  <2  --   --  <2   IGA  --   --   --   --   --  425*   IGG1  --   --   --   --   --  1,300*   IGG2  --   --   --   --   --  131*   IGG3  --   --   --   --   --  101   IGG4  --   --   --   --   --  1*       Metabolic Studies       Recent Labs   Lab Test 02/11/22  1032 02/10/22  0919 10/12/21  1251 09/23/21  1144 09/23/21  0628 09/22/21  1008 09/20/21  2128 09/20/21  1903 09/16/21  0749 09/15/21  0915 05/05/21  0707 05/03/21  0640 10/22/19  1314 10/21/19  1752 08/13/19  0000 08/07/19  0959   NA  --  134 135  --    < > 134   < > 132*  --  136   < > 132*   < > 133   < > 133   POTASSIUM  --  4.3 4.0  --    < > 3.5   < > 5.3   < > 4.8   < > 5.5*   < > 5.0   < > 4.2   CHLORIDE  --  94 102  --    < > 103   < > 101  --  98   < > 100   < > 109   < > 98   CO2  --  30 29  --    < > 22   < > 23  --  26   < > 23   < > 10*   < > 29   ANIONGAP  --  10 4  --    < > 9   < > 8  --  12   < > 9   < > 13   < > 6   BUN  --  44* 31*  --    < > 51*   < > 90*  --  79*   < > 85*   < > 66*   < > 27   CR  --  4.27* 3.85*  --    < > 4.46*   < > 5.85*  --  5.63*   < > 4.55*   < > 5.76*   < > 1.80*   GFRESTIMATED  --  11* 12*  --    < > 10*   < > 7*  --  8*   < > 10*   < > 8*   < > 31*   * 157* 108*  --    < > 192*   < > 98   < > 152*   < > 129*   < > 138*   < > 119*    A1C  --   --   --   --   --   --   --   --   --  5.8*  --   --    < >  --   --   --    CHELSEY  --  9.6 8.5  --    < > 8.0*   < > 8.5  --  8.6   < > 7.8*   < > 8.0*   < > 9.8   PHOS  --   --   --   --   --   --   --   --   --   --   --  5.9*   < > 6.7*   < >  --    MAG  --  1.8 1.8  --    < >  --   --   --   --   --   --   --    < > 2.0   < > 2.2   LACT  --   --   --  1.5  --  0.7   < >  --   --   --   --   --    < >  --    < >  --    PCAL  --   --   --   --   --   --   --  0.21  --   --   --   --    < >  --    < >  --    FGTL  --   --   --   --   --   --   --   --   --  210  --   --    < >  --    < >  --    CKT  --   --   --   --   --   --   --   --   --   --   --   --   --  70  --  36    < > = values in this interval not displayed.       Hepatic Studies    Recent Labs   Lab Test 10/12/21  1251 09/21/21  1055 09/20/21  1903 09/15/21  0915 05/01/21  0654 01/27/21  0414 01/26/21  0425   BILITOTAL 0.8 1.2 1.1   < >  --    < > 0.8   DBIL 0.4*  --   --    < >  --    < > 0.6*   ALKPHOS 316* 324* 321*   < >  --    < > 184*   PROTTOTAL 7.8 6.7* 7.4   < > 7.2   < > 6.0*  6.0*   ALBUMIN 2.8* 2.6* 3.0*   < >  --    < > 2.0*   AST 24 22 22   < >  --    < > 11   ALT 52* 80* 98*   < >  --    < > 30   LDH  --   --   --   --  164  --  187    < > = values in this interval not displayed.     Hematology Studies      Recent Labs   Lab Test 02/10/22  0920 09/23/21  0628 09/22/21  0610 09/21/21  1055 09/20/21  1903 09/15/21  0915 04/29/21  1020 04/08/21  0722 02/27/21  0853 02/25/21  0542   WBC 7.4 13.9* 12.2* 11.5* 9.9 8.3   < > 6.3   < > 2.9*   ANEU  --   --   --   --   --   --   --  4.8  --  1.9   ALYM  --   --   --   --   --   --   --  0.7*  --  0.6*   SHERRI  --   --   --   --   --   --   --  0.8  --  0.4   AEOS  --   --   --   --   --   --   --  0.1  --  0.1   HGB 11.9 10.4* 10.7* 10.3* 11.3* 11.0*   < > 9.7*   < > 9.9*   HCT 37.0 33.0* 33.3* 31.9* 36.1 35.1   < > 29.9*   < > 32.0*    165 200 218 228 213   < > 178   < > 293     < > = values in this interval not displayed.       posaconazole level ok 9/2021    Microbiology:  CMV 1/2022  Neg     Results for SARAI KILLIAN (MRN [id number]) as of 2/11/2022 20:39   Ref. Range 9/15/2021 09:15 10/12/2021 12:51 2/10/2022 09:20   EBV DNA Copies/mL Latest Ref Range: <=0 copies/mL 45,122 (H) 969,411 (H) 135,117 (H)       5/2021 pleural fluid KOH - pos fungal elements, fungal cx neg    10/2019 pleural fluid fungal cx   Aspergillus fumigatus    E-TEST   Amphotericin B 0.5 ug/mL No interpretation available    Comment: See comment... 1     Itraconazole 0.12 ug/mL No interpretation available    Micafungin <=0.06 ug/mL No interpretation available    Posaconazole <=0.06 ug/ml No interpretation available    Voriconazole 0.5 ug/mL No interpretation available      Lung tissue Fungal cx aug 2020    Aspergillus fumigatus     FUNGAL YEAST JAYME     Amphotericin B 1.0 ug/mL No interpretation available     Comment: See comment... 1      Itraconazole 0.25 ug/mL No interpretation available     Micafungin <=0.03 ug/mL No interpretation available     Posaconazole 0.12 ug/ml No interpretation available     Voriconazole 0.5 ug/mL No interpretation available      Imaging: personally reviewed   CXR 2/10/22  IMPRESSION:  1. Decreased retrocardiac opacities, likely representing  fibroatelectasis. Cannot exclude infectious component.  2. No new focal opacities, pleural effusion, or pneumothorax.  3. Mild bowel loop dilation in the limited upper abdomen.    CT chest 9/15/21  IMPRESSION: Small old partially calcified empyema paravertebral left  lower lung. Removal of previous thoracostomy tube. Unchanged  osteopenic T5 compression deformity. Bilateral lung transplantation.  Increased opacities in right lower lung which may indicate worsening  inflammation rather than edema. Continued right-sided bronchial  anastomotic narrowing, unchanged from just over 4 months ago.  Cholelithiasis. Aortic atherosclerosis.    CT chest  5/4/21  1. Decreased size of the peripherally calcified chronic empyema within  the medial left lower lobe with left sided chest tube in place.  Retained amphotericin bead is within the collection.  2. Cardiomegaly with basilar predominance groundglass opacities and  interlobular septal thickening, compatible with pulmonary edema.  3. Postsurgical changes of bilateral lung transplant with unchanged  mild right bronchial anastomotic narrowing.    CT chest 4/8/21  1. Stable partially calcified pleural cystic structure/collection  along the medial right lower lobe, consistent with chronic  empyema/sequela of prior infection. Trace bilateral pleural effusions.  2. Cardiomegaly with basilar predominant patchy groundglass opacities  with diffuse interlobular septal thickening, consistent with pulmonary  edema.  3. Tree-in-bud nodular opacities predominantly involving the lingula,  concerning for infection.  4. Stable postsurgical changes of bilateral lung transplantation.  5. Cholelithiasis.  6. Splenomegaly.

## 2022-02-11 NOTE — PROCEDURES
INTERVENTIONAL PULMONOLOGY       Procedure(s):    A flexible and rigid bronchoscopy   Airway exam  BAL  Therapeutic suctioning (2 sites)  Tissue/tumor debulking   Balloon dilation     Indication:  Post lung transplant , known RERE stenosis     Attending of Record:  Juana Baugh MD     Interventional Pulmonary Fellow   Belkys Rios MD     Trainees Present:   None     Medications:    General Anesthesia - See anesthesia flowsheet for details    Sedation Time:   Per Anesthesia Care Provider    Time Out:  Performed    The patient's medical record has been reviewed.  The indication for the procedure was reviewed.  The necessary history and physical examination was performed and reviewed.  The risks, benefits and alternatives of the procedure were discussed with the the patient in detail and she had the opportunity to ask questions.  I discussed in particular the potential complications including risks of minor or life-threatening bleeding and/or infection, respiratory failure, vocal cord trauma / paralysis, pneumothorax, and discomfort. Sedation risks were also discussed including abnormal heart rhythms, low blood pressure, and respiratory failure. All questions were answered to the best of my ability.  Verbal and written informed consent was obtained.  The proposed procedure and the patient's identification were verified prior to the procedure by the physician and the nurse.    The patient was assessed for the adequacy for the procedure and to receive medications.   Mental Status:  Alert and oriented x 3  Airway examination:  Class II (Complete visualization of uvula)  Pulmonary:  Clear to ausculation bilaterally  CV:  RRR, no murmurs or gallops  ASA Grade:  (III)  Severe systemic disease    After clinical evaluation and reviewing the indication, risks, alternatives and benefits of the procedure the patient was deemed to be in satisfactory condition to undergo the procedure.      Immediately before administration of  medications the patient was re-assessed for adequacy to receive sedatives including the heart rate, respiratory rate, mental status, oxygen saturation, blood pressure and adequacy of pulmonary ventilation. These same parameters were continuously monitored throughout the procedure.    A Tuberculosis risk assessment was performed:  The patient has no known RISK of Tuberculosis    The procedure was performed in a negative airflow room: The patient could not be moved to a negative airflow room because of no risk of TB    Maneuvers / Procedure:      A Flexible and 12mm Rigid bronchoscope bronchoscope was used for the procedure. The rigid bronchoscope was inserted into the mouth. Uvula, epiglottis and vocal cords were seen. The scope was advanced turning the bevel to 90degress while passing through the cords and into the trachea.     Airway Examination: A complete airway examination was performed from the distal trachea to the subsegmental level in each lobe of both lungs.  Pertinent findings include --> RERE stenosis , progressed since last procedure          BAL: The bronchoscope was wedged into the lingula bronchus. A total of 80 cc of saline was instilled and a total of 40  was aspirated. This was sent for analysis.     Therapeutic suctioning: 15-20min of operative time was spent clearing out the airway of debris, blood and mucous prior to the intervention.     Tumor/Tissue Debulking: The Right mainstem airway was accessed and tumor/tissue debulking was performed using the Laser. Hemostasis and patency of the airway was acheived post-debulking.    Balloon dilation with 10-11-12 elation balloon was performed . Dilated 3 times , held for 1 minute each dilation .  Dilated to 10.5 mm .     Any disposable equipment was visually inspected and deemed to be intact immediately post procedure.      Relevant Pictures      RERE stenosis prior to intervention         RERE post intervention         BI with RML and RLL         LMS  anastamosis and distal airways         Recommendations:     Post laser and dilation of RERE . Dilated to 10.5 mm . BAL collected from ligula .     -->  Repeat bronchoscopy in 3 months   -->  Follow up on BAL results per transplant team       MD Chloe Jade  Interventional Pulmonology Surgery Scheduler  Office: [phone]  Email: [email or web address]

## 2022-02-11 NOTE — ANESTHESIA CARE TRANSFER NOTE
Patient: Kecia Blue    Procedure: Procedure(s):  flexible, rigid bronchoscopy, tissue debulking, airway dilation, co2 laser, bronchoalveolar lavage       Diagnosis: Lung replaced by transplant (H) [Z94.2]  Diagnosis Additional Information: No value filed.    Anesthesia Type:   General     Note:    Oropharynx: oropharynx clear of all foreign objects  Level of Consciousness: awake  Oxygen Supplementation: room air    Independent Airway: airway patency satisfactory and stable  Dentition: dentition unchanged  Vital Signs Stable: post-procedure vital signs reviewed and stable  Report to RN Given: handoff report given  Patient transferred to: PACU  Comments: VSS. Breathing spontaneously at a regular rate with adequate tidal volumes and maintaining O2 sats. Denies nausea or pain. No apparent complications from anesthesia.     Chapin Mcdaniel MD  Anesthesia CA-1    Handoff Report: Identifed the Patient, Identified the Reponsible Provider, Reviewed the pertinent medical history, Discussed the surgical course, Reviewed Intra-OP anesthesia mangement and issues during anesthesia, Set expectations for post-procedure period and Allowed opportunity for questions and acknowledgement of understanding      Vitals:  Vitals Value Taken Time   /64 02/11/22 1400   Temp     Pulse 77 02/11/22 1401   Resp     SpO2 94 % 02/11/22 1401   Vitals shown include unvalidated device data.    Electronically Signed By: Chapin Mcdaniel MD  February 11, 2022  2:01 PM

## 2022-02-12 LAB — CMV DNA SPEC NAA+PROBE-ACNC: NOT DETECTED IU/ML

## 2022-02-14 ENCOUNTER — REFERRAL (OUTPATIENT)
Dept: TRANSPLANT | Facility: CLINIC | Age: 62
End: 2022-02-14

## 2022-02-14 DIAGNOSIS — Z01.818 PRE-TRANSPLANT EVALUATION FOR KIDNEY TRANSPLANT: ICD-10-CM

## 2022-02-14 DIAGNOSIS — N18.6 ESRD (END STAGE RENAL DISEASE) (H): Primary | ICD-10-CM

## 2022-02-14 DIAGNOSIS — N18.5 CHRONIC KIDNEY DISEASE, STAGE V (H): ICD-10-CM

## 2022-02-14 DIAGNOSIS — J98.4 DISEASE OF LUNG: ICD-10-CM

## 2022-02-14 LAB
PATH REPORT.COMMENTS IMP SPEC: NORMAL
PATH REPORT.FINAL DX SPEC: NORMAL
PATH REPORT.GROSS SPEC: NORMAL
PATH REPORT.MICROSCOPIC SPEC OTHER STN: NORMAL
PATH REPORT.RELEVANT HX SPEC: NORMAL

## 2022-02-14 PROCEDURE — 88312 SPECIAL STAINS GROUP 1: CPT | Mod: 26 | Performed by: PATHOLOGY

## 2022-02-14 PROCEDURE — 88108 CYTOPATH CONCENTRATE TECH: CPT | Mod: 26 | Performed by: PATHOLOGY

## 2022-02-14 NOTE — LETTER
February 23, 2022    Kecia Blue  [address] [place] [address]    Dear Kecia,    Thank you for your interest in the Transplant Center at [facility]. We look forward to being a part of your care team and assisting you through the transplant process.    As we discussed, your transplant coordinator is Yenni Titus (Kidney).  You may call your coordinator at any time with questions or concerns.  [phone].    Please complete the following.    1. Fill out and return the enclosed forms    Authorization for Electronic Communication    Authorization to Discuss Protected Health Information    Authorization for Release of Protected Health Information    Authorization for Care Everywhere Release of Information    2. Sign up for:    [employer], access to your electronic medical record (see enclosed pamphlet)    [employer], a transplant education website    You can use these tools to learn more about your transplant, communicate with your care team, and track your medical details      Sincerely,    Solid Organ Transplant  [facility]    cc: Referring Physician PCP Care Team

## 2022-02-14 NOTE — LETTER
Kecia Blue  [address] [place] [address]                February 23, 2022                                                                                        MEDICAL RECORDS REQUEST    [facility] Records Request                      Facility: [facility] the [facility]    Thank you for referring your patient to the [facility], in order to process the referral we will need the following information;    1. 0309 form     Please call our office at [phone] if you have any questions or concerns.                Please fax all paper records to [phone] within 1-3 business days.      Thank you,   The SOT Referral Intake Team     [facility]  Solid Organ Transplant Office  [address] [place] [address], [address] [address]

## 2022-02-15 LAB — POSACONAZOLE SERPL-MCNC: 1.1 UG/ML (ref 0.7–5)

## 2022-02-16 VITALS — WEIGHT: 123 LBS | BODY MASS INDEX: 21.79 KG/M2 | HEIGHT: 63 IN

## 2022-02-16 NOTE — TELEPHONE ENCOUNTER
PCP: Rosy Vu,   Referring Provider: Ame Chow,  Referring Diagnosis: not provided  Hx Lung Txp 3/01/2018    Is patient under the age of 65? yes  Is patient diabetic? no  Is patient on insulin?   Was patient offered a pancreas transplant referral? no    Is patient in a group home/assisted living? no  Does patient have a guardian? no    Referral intake process completed.  Patient is aware that after financial approval is received, medical records will be requested.   Patient confirmed for a callback from transplant coordinator on February 21, 2022. (within 2 weeks)  Tentative evaluation date March 8, 2022 (within 4 weeks) if appointment is virtual, does patient have capabilities of setting this up? yes    Confirmed coordinator will discuss evaluation process in more detail at the time of their call.   Patient is aware of the need to arrange age appropriate cancer screening, vaccinations, and dental care.  Reminded patient to complete questionnaire, complete medical records release, and review packet prior to evaluation visit .  Assessed patient for special needs (ie-wheelchair, assistance, guardian, and ):  wheelchair prn for bad knee   Patient instructed to call [phone] with questions.     Patient gave verbal consent during intake call to obtain medical records and documents outside of [facility]:  yes

## 2022-02-17 PROBLEM — Z94.2 S/P LUNG TRANSPLANT (H): Status: ACTIVE | Noted: 2018-03-01

## 2022-02-21 NOTE — TELEPHONE ENCOUNTER
Reviewed pt's chart for pre-kidney transplant evaluation planning. Pt lives in [place], [place]. Referred to our center by the lung transplant team. 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 Biopsy completed 10/2019. Pt is on dialysis. Pt is not diabetic. 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 Additionally, she follows with ID for aspergillus empyema, EBV, CMV. 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 Her ascites has improved with dialysis and has not needed a paracentesis. Heart hx: DVT no longer on anticoagulation.  Lung status: recently underwent bronch and was negative. BMI 22 on 2/11/22.  Discussed BMI requirement for transplant with patient: yes, appropriate for transplant. Due for colonoscopy this year. Dental: encouraged to keep up to date.  Last Pap: unsure.  Mammogram: 6/2021.  Pt is not a smoker, does not consume alcohol, and does not use recreational drugs. Pt is independent w/ ADLs, though is planning to have a knee replacement and at times needs a wheelchair for long distances.  Pt lives w/ family and has great support following transplant.       I also introduced [employer] and asked pt to create an account and view pre-kidney transplant videos for review with me following evaluation. Confirmed STD 3/8/22. Informed pt they will hear from scheduling to arrange the evaluation. Smartset orders entered.

## 2022-02-21 NOTE — TELEPHONE ENCOUNTER
Reviewed pt's chart for pre-kidney transplant evaluation planning. Pt lives in [place], [place]. Referred to our center by the lung transplant team. 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 Biopsy completed 10/2019. Pt is on dialysis. Pt is not diabetic. 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 Additionally, she follows with ID for aspergillus empyema, EBV, CMV. 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 Her ascites has improved with dialysis and has not needed a paracentesis. Heart hx: DVT no longer on anticoagulation.  Lung status: recently underwent bronch and was negative. BMI 22 on 2/11/22.  Discussed BMI requirement for transplant with patient: yes, appropriate for transplant. Due for colonoscopy this year. Dental: encouraged to keep up to date.  Last Pap unsure.  Mammogram: 6/2021.   Pt is not a smoker, does not consume alcohol, and does not use recreational drugs. Pt is independent w/ ADLs, though is planning to have a knee replacement and at times needs a wheelchair for long distances.  Pt lives w/ family and has great support following transplant.     I also introduced [employer] and asked pt to create an account and view pre-kidney transplant videos for review with me following evaluation. Confirmed STD 3/8/22. Informed pt they will hear from scheduling to arrange the evaluation. [employer] orders entered.

## 2022-02-25 LAB — BACTERIA BRONCH: NORMAL

## 2022-03-01 DIAGNOSIS — Z94.2 LUNG REPLACED BY TRANSPLANT (H): ICD-10-CM

## 2022-03-01 DIAGNOSIS — K21.9 GASTROESOPHAGEAL REFLUX DISEASE WITHOUT ESOPHAGITIS: ICD-10-CM

## 2022-03-01 RX ORDER — PANTOPRAZOLE SODIUM 40 MG/1
40 TABLET, DELAYED RELEASE ORAL
Qty: 30 TABLET | Refills: 11 | Status: SHIPPED | OUTPATIENT
Start: 2022-03-01 | End: 2023-04-13

## 2022-03-03 ENCOUNTER — TELEPHONE (OUTPATIENT)
Dept: TRANSPLANT | Facility: CLINIC | Age: 62
End: 2022-03-03

## 2022-03-03 ENCOUNTER — TELEPHONE (OUTPATIENT)
Dept: TRANSPLANT | Facility: CLINIC | Age: 62
End: 2022-03-03
Payer: MEDICARE

## 2022-03-03 ENCOUNTER — LAB (OUTPATIENT)
Dept: LAB | Facility: CLINIC | Age: 62
End: 2022-03-03
Payer: MEDICARE

## 2022-03-03 DIAGNOSIS — Z94.2 S/P LUNG TRANSPLANT (H): ICD-10-CM

## 2022-03-03 DIAGNOSIS — Z94.2 LUNG TRANSPLANT STATUS, BILATERAL (H): ICD-10-CM

## 2022-03-03 PROCEDURE — 80197 ASSAY OF TACROLIMUS: CPT

## 2022-03-03 RX ORDER — AZATHIOPRINE 50 MG/1
25 TABLET ORAL DAILY
Qty: 45 TABLET | Refills: 3 | Status: SHIPPED | OUTPATIENT
Start: 2022-03-03 | End: 2023-03-15

## 2022-03-03 NOTE — TELEPHONE ENCOUNTER
DATE:  3/3/2022     TIME OF RECEIPT FROM LAB:  12:17    ORDERING PROVIDER: Claudine    LAB TEST:  Alk phos    LAB VALUE:  245    RESULTS GIVEN WITH READ-BACK TO (PROVIDER):  Mikayla Latham    TIME LAB VALUE REPORTED TO PROVIDER:   12:18

## 2022-03-03 NOTE — TELEPHONE ENCOUNTER
Patient Call: Medication Refill  Route to LPN  Instruct the patient to first contact their pharmacy. If they have called their pharmacy and require further assistance, route to LPN.    Pharmacy Name: [facility]  Pharmacy Location: [place]   Name of Medication: Imuran Dose: 50 mg  When will the patient be out of this medication?: Less than 3 days (Route high priority)  90 day if possible otherwise monthly

## 2022-03-04 LAB
TACROLIMUS BLD-MCNC: 9.6 UG/L (ref 5–15)
TME LAST DOSE: NORMAL H
TME LAST DOSE: NORMAL H

## 2022-03-04 NOTE — RESULT ENCOUNTER NOTE
Armand Mcdonnell,   Your tacrolimus level was 9.6 at 12 hours on 3/3/22 which is within your goal range of 8-10. No dose change at this time. Please call the transplant office ([phone]) with any questions.   Thanks,   Mikayla

## 2022-03-07 ENCOUNTER — DOCUMENTATION ONLY (OUTPATIENT)
Dept: HEALTH INFORMATION MANAGEMENT | Facility: CLINIC | Age: 62
End: 2022-03-07
Payer: MEDICARE

## 2022-03-07 NOTE — PROGRESS NOTES
Pt evaluated via billable telephone visit d/t COVID-19 restrictions. Time spent: 15 min    [facility] Solid Organ Transplant  Outpatient MNT: Kidney Transplant Evaluation    Current BMI: 21.8 (HT 63 in,  lbs/56 kg)- data from 2/16   BMI is within recommendation of <35 for kidney transplant     Time Spent: 15 minutes  Visit Type: Initial   Referring Physician: Natalia   Pt accompanied by: Ranjan, her      History of previous txp: lung 2018  Dialysis: yes    Dialysis Info: HD 12/2019  Protein supplement: yes     Nutrition Assessment  - Appetite: good   - Food allergies/intolerances: none   - Meal prep & grocery shopping: pt and her    - Previous RD education: yes   - Issues chewing or swallowing: no   - N/V/D/C: no   - Food access concerns: no     Vitamins, Supplements, Pertinent Meds: renavitelalaslo (takes consistently)  Herbal Medicines/Supplements: none     Edema: none     Weight hx:   - weight gain of ~20 lbs over a few years since lung txp; another 10 lbs in the past year, feels healthy at current weight   - 113 lbs during pre lung eval in 2018/BMI 19.5    Diet Recall  Breakfast Toast x 1-2 with PB; 1x/week will have eggs    Lunch [place] (w/ cheese and deli meat or fresh meat leftovers)- deli counter + occasional chips/crackers    Dinner Spaghetti, tacos, pizza, steak, chicken, fish; some potato and veggies (not daily)   Snacks Candy, cookies, peanuts/almonds/cashews    Beverages Juice (orange, not daily, 6 oz), milk (16 oz/day), water, sweetened iced tea (12 oz a few times/week)    Alcohol None    Dining out 2x/month      Physical Activity  None; needs knee replacement; plan for replacement     Labs  3/3 K 4.2   Phos 5.6   A1c 5.8 in Sept    Nutrition Diagnosis  No nutrition diagnosis identified at this time     Nutrition Intervention  Nutrition education provided:  Discussed sodium intake (low sodium foods and drinks, seasoning food without salt and tips for low sodium  diet).  Reviewed wnl K level with slightly high Phos level. She reports taking her binders as prescribed. Reduce milk consumption to 4-8 oz/day, look for lower Na deli meat, preferably pre-packaged to reduce risk for Listeria. Recommend taking binders w/ snacks as well (ie nuts).     Reviewed post txp diet guidelines in brief (will review in further detail post txp):  (1) Review of proper food safety measures d/t immunosuppressant therapy post-op and increased risk for food-borne illness    (2) Avoid the following post txp d/t risk for rejection, unknown effects on the organs, and/or potential interactions with immunosuppressants:  - Herbal, Chinese, holistic, chiropractic, natural, alternative medicines and supplements  - Detoxes and cleanses  - Weight loss pills  - Protein powders or other products with extracts or herbs (ie green tea extract)    (3) Med regimen and possible side effects    Patient Understanding: Pt verbalized understanding of education provided.  Expected Engagement: Good  Follow-Up Plans: PRN     Nutrition Goals  No nutrition goals identified at this time     Yudelka Ewing, RD, LD, CCTD

## 2022-03-08 ENCOUNTER — VIRTUAL VISIT (OUTPATIENT)
Dept: TRANSPLANT | Facility: CLINIC | Age: 62
End: 2022-03-08
Attending: TRANSPLANT SURGERY
Payer: MEDICARE

## 2022-03-08 ENCOUNTER — DOCUMENTATION ONLY (OUTPATIENT)
Dept: TRANSPLANT | Facility: CLINIC | Age: 62
End: 2022-03-08

## 2022-03-08 ENCOUNTER — TRANSFERRED RECORDS (OUTPATIENT)
Dept: HEALTH INFORMATION MANAGEMENT | Facility: CLINIC | Age: 62
End: 2022-03-08

## 2022-03-08 ENCOUNTER — TELEPHONE (OUTPATIENT)
Dept: TRANSPLANT | Facility: CLINIC | Age: 62
End: 2022-03-08

## 2022-03-08 VITALS — WEIGHT: 126 LBS | BODY MASS INDEX: 22.32 KG/M2

## 2022-03-08 DIAGNOSIS — Z01.818 PRE-TRANSPLANT EVALUATION FOR KIDNEY TRANSPLANT: ICD-10-CM

## 2022-03-08 DIAGNOSIS — N18.5 CHRONIC KIDNEY DISEASE, STAGE V (H): ICD-10-CM

## 2022-03-08 DIAGNOSIS — N18.6 ESRD (END STAGE RENAL DISEASE) (H): ICD-10-CM

## 2022-03-08 DIAGNOSIS — J98.4 DISEASE OF LUNG: ICD-10-CM

## 2022-03-08 DIAGNOSIS — Z53.9 ERRONEOUS ENCOUNTER--DISREGARD: Primary | ICD-10-CM

## 2022-03-08 PROCEDURE — G0463 HOSPITAL OUTPT CLINIC VISIT: HCPCS | Mod: PN,RTG | Performed by: TRANSPLANT SURGERY

## 2022-03-08 PROCEDURE — 99207 PR NO CHARGE COORDINATED CARE PS: CPT

## 2022-03-08 PROCEDURE — 99205 OFFICE O/P NEW HI 60 MIN: CPT | Mod: 95 | Performed by: TRANSPLANT SURGERY

## 2022-03-08 NOTE — TELEPHONE ENCOUNTER
Pt returned call, she had been trying to reach kidney coordinator and already spoke to Yenni. She had no questions or concerns for this writer.

## 2022-03-08 NOTE — LETTER
3/8/2022     RE: Kecia Blue  [address] [place] [address]    Dear Colleague,    Thank you for referring your patient, Kecia Blue, to the [facility]. Please see a copy of my visit note below.    No notes on file    Again, thank you for allowing me to participate in the care of your patient.        Sincerely,        SAVANNAH

## 2022-03-08 NOTE — Clinical Note
Patient needs to reschedule just her nephrology consult for [facility] and Dr. Sinclair would like to see her as he's already done the pre-charting. It doesn't have to be in a [facility] slot, just in his clinic please. Thanks!

## 2022-03-08 NOTE — LETTER
3/8/2022     RE: Kecia Blue  [address] [place] [address]    Dear Colleague,    Thank you for referring your patient, Kecia Blue, to the [facility]. Please see a copy of my visit note below.    Video-Visit Details          Type of service:  Video Visit 3 8     Video Start Time: 11am  Video End Time: 12:05pm    Originating Location (pt. Location): home     Distant Location (provider location):  [facility]     Platform used for Video Visit: doximity    Transplant Surgery Consult Note     Medical record number: [id number]  YOB: 1960,   Consult requested by Dr. Chow for evaluation of kidney transplant candidacy.    Assessment and Recommendations:Ms. Blue appears to be a good candidate for kidney transplantation and has a good understanding of the risks and benefits of this approach to the management of renal failure. The following issues should be addressed prior to finalizing her transplant candidacy:     Ms. 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    Dietician consult ordered: Yes  Social work consult ordered: Yes  Transplant listing labs ordered to include HLA, ABOx2, Cr, etc.  Obtain past medical records  Up-to-date cancer screening  Infectious disease evaluation/consult re acceptability for transplant  Hepatology consult      The majority of our visit was spent in counselling, discussing the medical and surgical risks of kidney transplantation. We talked about the pros and cons of transplantation vs. dialysis.  We discussed the fact that it was important to think about the pros and cons of each treatment option and make an active decision.  We also discussed the fact that the two were interconnected and that if the transplant failed, it  "is possible that dialysis might be necessary before another transplant.  We also discussed the fact that she was already on immunosuppressive therapy and so the major risk of a kidney transplant was the surgery itself.    We also discussed the fact that if choosing to have a transplant, a second important decision was a living versus a  donor transplant.  We talked about the pros and cons of each option - the advantage of a  donor transplant being not asking someone to go through the living donor operation, the disadvantage, 5-6 years waiting; the advantage of a living donor transplant, much shorter time to transplant and significantly better long-term outcomes, the disadvantage being the risk to the donor.  Although I didn't express an opinion regarding transplantation or dialysis, I suggested that if opting for a transplant, we would strongly recommend a living donor transplant.  She says there are a number of potential donors.    We discussed living donor risks and the evaluation process. We discussed the fact that a living donor does not have to be a direct match and that if there was a donor who met the criteria but was not a match, there was an option of participating in the national paired exchange system.  I described how the system would work.    I also discussed the new ( donor) kidney (KDPI) scoring system. We discussed the advantages and disadvantages of accepting an \"expanded criteria\" donor kidney, and the latest data as to who potentially benefits - those >45 and/or having diabetes a cause for ESKD - by receiving an expanded criteria donor kidney versus waiting longer for a standard criteria donor. Based on her age, i recommended she say \"yes\" to a high KDPI kidney.    They had a number of questions - about the surgery itself, the placement of the kidney; donor recovery and long-term limitations; recipient recovery, limitations, and need to be close to the transplant centers - " which I answered.  Ms. Blue asked good questions and her candidacy will be reviewed at our Multidisciplinary Selection Committee. Thank you for the opportunity to participate in Ms. Blue's care.    65 min spent on the date of the encounter in chart review, patient visit,  documentation and/or discussion with other providers about the issues documented above.          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 difficile colitis      Cytomegalovirus (CMV) viremia (H)      Dermatomyositis (H)      Dysplasia of cervix, low grade (ESTRADA 1)      EBV (Franklin-Barr virus) viremia      ESRD (end stage renal disease) on dialysis (H)      ILD (interstitial lung disease) (H)      Lung replaced by transplant (H)      Osteopenia      Paroxysmal atrial fibrillation (H)      Pneumocystis jiroveci pneumonia (H)      PONV (postoperative nausea and vomiting)      Post-menopause      Pulmonary hypertension (H)      Raynaud's disease      Seronegative rheumatoid arthritis (H)      Past Surgical History:   Procedure Laterality Date     BRONCHOSCOPY (RIGID OR FLEXIBLE), DIAGNOSTIC N/A 4/10/2018    Procedure: COMBINED BRONCHOSCOPY (RIGID OR FLEXIBLE), LAVAGE;;  Surgeon: Mariposa Donohue MD;  Location: [facility]      BRONCHOSCOPY (RIGID OR FLEXIBLE), DIAGNOSTIC N/A 12/23/2020    Procedure: BRONCHOSCOPY, WITH BRONCHOALVEOLAR LAVAGE;  Surgeon: Uri Bass MD;  [facility]     BRONCHOSCOPY (RIGID OR FLEXIBLE), DILATE BRONCHUS / TRACHEA N/A 10/11/2018    Procedure: BRONCHOSCOPY (RIGID OR FLEXIBLE), DILATE BRONCHUS / TRACHEA;  Flexible And Rigid Bronchoscopy and Dilation;  Surgeon: Wilber Lin MD;  [facility]     BRONCHOSCOPY FLEXIBLE N/A 3/13/2018    Procedure: BRONCHOSCOPY FLEXIBLE;  Flexible Bronchoscopy ;  Surgeon: Gissell Sanchez MD;  [facility]     BRONCHOSCOPY FLEXIBLE N/A 5/9/2018    Procedure: BRONCHOSCOPY FLEXIBLE;;  Surgeon: Wilber Lin MD;  [facility]     BRONCHOSCOPY FLEXIBLE AND RIGID N/A 9/10/2018    Procedure: BRONCHOSCOPY FLEXIBLE AND RIGID;  Flexible and Rigid Bronchoscopy with Balloon Dilation, tissue debulking with cryo, and Right mainstem bronchus stent placement;  Surgeon: Wilber Lin MD;  [facility]     BRONCHOSCOPY RIGID N/A 6/6/2018    Procedure: BRONCHOSCOPY RIGID;;  Surgeon: Lopez Macias MD;  [facility]     BRONCHOSCOPY, DILATE BRONCHUS, STENT BRONCHUS, COMBINED N/A 6/11/2018    Procedure: COMBINED BRONCHOSCOPY, DILATE BRONCHUS, STENT BRONCHUS;  Flexible Bronchoscopy, Balloon Dilation, Bronchial Washings;  Surgeon: Wilber Lin MD;  [facility]     BRONCHOSCOPY, DILATE BRONCHUS, STENT BRONCHUS, COMBINED Right 7/10/2018    Procedure: COMBINED BRONCHOSCOPY, DILATE BRONCHUS, STENT BRONCHUS;  Flexible Bronchoscopy, Balloon Dilation, Bronchial Washings  ;  Surgeon: Wilber Lin MD;  [facility]     BRONCHOSCOPY, DILATE BRONCHUS, STENT BRONCHUS, COMBINED N/A 8/2/2018    Procedure: COMBINED BRONCHOSCOPY, DILATE BRONCHUS, STENT BRONCHUS;  Flexible Bronchoscopy, Bronchial Washings, Balloon Dilation;  Surgeon: Wilber Lin MD;  [facility]     BRONCHOSCOPY, DILATE BRONCHUS, STENT BRONCHUS,  COMBINED N/A 8/20/2018    Procedure: COMBINED BRONCHOSCOPY, DILATE BRONCHUS, STENT BRONCHUS;  Flexible Bronchoscopy, Balloon Dilation;  Surgeon: Wilber Lin MD;  [facility]     BRONCHOSCOPY, DILATE BRONCHUS, STENT BRONCHUS, COMBINED N/A 10/29/2018    Procedure: Flexible Bronchoscopy, Balloon Dilation, Stent Revision, Airway Examination And Therapeutic Suctioning, Cyro Tumor Debulking;  Surgeon: Wilber Lin MD;  [facility]     BRONCHOSCOPY, DILATE BRONCHUS, STENT BRONCHUS, COMBINED N/A 11/20/2018    Procedure: Rigid Bronchoscopy, Stent Removal and dilitation;  Surgeon: Wilber Lin MD;  [facility]     BRONCHOSCOPY, DILATE BRONCHUS, STENT BRONCHUS, COMBINED N/A 12/14/2018    Procedure: Flexible And Rigid Bronchoscopy, Balloon Dilation, Bronchial Washing;  Surgeon: Wilber Lin MD;  [facility]     BRONCHOSCOPY, DILATE BRONCHUS, STENT BRONCHUS, COMBINED N/A 1/17/2019    Procedure: Flexible And Rigid Bronchoscopy, Balloon Dilation.;  Surgeon: Wilber Lin MD;  [facility]     BRONCHOSCOPY, DILATE BRONCHUS, STENT BRONCHUS, COMBINED N/A 3/7/2019    Procedure: Flexible and Rigid Bronchoscopy, Bronchial Washing, Balloon Dilation;  Surgeon: Wilber Lin MD;  [facility]     BRONCHOSCOPY, DILATE BRONCHUS, STENT BRONCHUS, COMBINED N/A 6/6/2019    Procedure: Rigid and Flexible Bronchoscopy, Balloon Dilation;  Surgeon: Wilber Lin MD;  [facility]     BRONCHOSCOPY, DILATE BRONCHUS, STENT BRONCHUS, COMBINED N/A 10/11/2019    Procedure: Flexible and Rigid Bronchoscopy, Balloon Dilation, Bronchoalveolar Lagave;  Surgeon: Wilber Lin MD;  [facility]     BRONCHOSCOPY, DILATE BRONCHUS, STENT BRONCHUS, COMBINED N/A 2/19/2021    Procedure: BRONCHOSCOPY, flexible, airway dilation, and bronchial wash;  Surgeon: Wilber Lin MD;  [facility]     BRONCHOSCOPY, DILATE BRONCHUS, STENT BRONCHUS, COMBINED N/A  4/9/2021    Procedure: BRONCHOSCOPY, flexible and rigid, Airway suctioning;  Surgeon: Mati Norris MD;  [facility]     CV RIGHT HEART CATH MEASUREMENTS RECORDED N/A 3/10/2020    Procedure: CV RIGHT HEART CATH;  Surgeon: Wai Garcia MD;  [facility]     ENT SURGERY      tonsillectomy as a child     ESOPHAGOSCOPY, GASTROSCOPY, DUODENOSCOPY (EGD), COMBINED N/A 10/29/2018    Procedure: COMBINED ESOPHAGOSCOPY, GASTROSCOPY, DUODENOSCOPY (EGD) with biopsies and polypectomy;  Surgeon: Chente Bloom MD;  [facility]     INSERT EXTRACORPORAL MEMBRANE OXYGENATOR ADULT (OUTSIDE OR) N/A 2/27/2018    Procedure: INSERT EXTRACORPORAL MEMBRANE OXYGENATOR ADULT (OUTSIDE OR);  INSERT EXTRACORPORAL MEMBRANE OXYGENATOR ADULT (OUTSIDE OR) ;  Surgeon: Toby Hernandez MD;  [facility]     IR CVC TUNNEL PLACEMENT > 5 YRS OF AGE  10/25/2019     IR DIALYSIS FISTULOGRAM LEFT  3/2/2021     IR DIALYSIS MECH THROMB, PTA  3/2/2021     IR FLUORO 0-1 HOUR  5/7/2021     IR GASTRO JEJUNOSTOMY TUBE PLACEMENT  2/16/2021     IR PARACENTESIS  1/8/2020     IR THORACENTESIS  9/13/2019     IR TRANSCATHETER BIOPSY  1/8/2020     LASER CO2 BRONCHOSCOPY N/A 4/30/2021    Procedure: Flexible and Rigid Bronchoscopy and Tissue Debulking with CO2 Laser Assistance;  Surgeon: Mati Norris MD;  [facility]     LASER CO2 BRONCHOSCOPY N/A 6/11/2021    Procedure: BRONCHOSCOPY, Flexible and Rigid Bronchoscopy, Tissue Debulking with cryo Assistance, airway dilation,;  Surgeon: Mati Norris MD;  [facility]     LASER CO2 BRONCHOSCOPY N/A 9/16/2021    Procedure: BRONCHOSCOPY, flexible and rigid, CO2 Laser Debulking;  Surgeon: Mati Norris MD;  [facility]     LASER CO2 BRONCHOSCOPY N/A 2/11/2022    Procedure: flexible, rigid bronchoscopy, tissue debulking, airway dilation, co2 laser, bronchoalveolar lavage;  Surgeon: Juana Baugh MD;  [facility]     no prior surgery        REMOVE EXTRACORPORAL MEMBRANE OXYGENATOR ADULT N/A 3/3/2018    Procedure: REMOVE EXTRACORPORAL MEMBRANE OXYGENATOR ADULT;  Removal of Right Femoral Venous and Right Axillary Arterial Extracorporeal Membrane Oxygenator;  Surgeon: Toby Hernandez MD;  [facility]     TRANSPLANT LUNG RECIPIENT SINGLE X2 Bilateral 3/1/2018    Procedure: TRANSPLANT LUNG RECIPIENT SINGLE X2;  Median Sternotomy, Extracorporeal Membrane Oxygenator, bilateral sequential lung transplant;  Surgeon: Toby Hernandez MD;  [facility]     Family History   Problem Relation Age of Onset     Hypertension Mother      Arthritis Mother      Pancreatic Cancer Father      Diabetes Father      Social History     Socioeconomic History     Marital status:      Spouse name: Not on file     Number of children: Not on file     Years of education: Not on file     Highest education level: Not on file   Occupational History     Not on file   Tobacco Use     Smoking status: Never Smoker     Smokeless tobacco: Never Used   Substance and Sexual Activity     Alcohol use: No     Alcohol/week: 1.0 standard drink     Types: 1 Glasses of wine per week     Drug use: No     Sexual activity: Not on file   Other Topics Concern     Parent/sibling w/ CABG, MI or angioplasty before 65F 55M? No   Social History Narrative    3/6/2019 - Lives with . Has three daughters. Four grandchildren (two ). No pets. Travelled previously to [place]. Has visited [place] several times.      Social Determinants of Health     Financial Resource Strain: Not on file   Food Insecurity: Not on file   Transportation Needs: Not on file   Physical Activity: Not on file   Stress: Not on file   Social Connections: Not on file   Intimate Partner Violence: Not on file   Housing Stability: Not on file       ROS:   CONSTITUTIONAL:  No fevers or chills  EYES: negative for icterus  ENT:  negative for hearing loss, tinnitus and sore throat  RESPIRATORY:   negative for cough, sputum, dyspnea  CARDIOVASCULAR:  negative for chest pain  GASTROINTESTINAL:  negative for nausea, vomiting, diarrhea or constipation  GENITOURINARY:  negative for incontinence, dysuria, bladder emptying problems  HEME:  No easy bruising  INTEGUMENT:  negative for rash and pruritus  NEURO:  Negative for headache, seizure disorder  Allergies:   No Known Allergies  Medications:  Prescription Medications as of 3/10/2022       Rx Number Disp Refills Start End Last Dispensed Date Next Fill Date Owning Pharmacy    acetaminophen (TYLENOL) 325 MG tablet  60 tablet  2/25/2021        Sig: Take 1 tablet (325 mg) by mouth every 4 hours as needed for mild pain or fever    Class: No Print Out    Route: Oral    albuterol (PROVENTIL) (2.5 MG/3ML) 0.083% neb solution  360 mL 4 2/10/2022    [facility][id number] - [address] AT [facility] & [facility]    Sig: Take 1 vial (2.5 mg) by nebulization daily    Class: Historical    Route: Nebulization    azaTHIOprine (IMURAN) 50 MG tablet  45 tablet 3 3/3/2022    [id number] - [address] AT [facility] & [facility]    Sig: Take 0.5 tablets (25 mg) by mouth daily    Class: E-Prescribe    Notes to Pharmacy: TXP DT 3/1/2018 (Lung) TXP Dischg DT  DX Lung replaced by transplant Z94.2 TX [facility] ([place], [place])    Route: Oral    budesonide (PULMICORT) 0.5 MG/2ML neb solution  60 mL 11 2/10/2022    [facility][id number] - [address] AT [facility] & [facility]    Sig: Take 2 mLs (0.5 mg) by nebulization daily    Class: Historical    Route: Nebulization    calcium acetate (PHOSLO) 667 MG CAPS capsule        [facility] #[id number] - [address] AT [facility] & [facility]    Sig: Take 667 mg by mouth daily with food (largest meal)    Class: Historical    Route: Oral    Magnesium Cl-Calcium Carbonate (SLOW-MAG) 71.5-119  MG TBEC  90 tablet 3 2021    [id number] - [place], [address] AT [facility] & [facility]    Sig: Take 3 tablets by mouth four times a week Take on non dialysis days //    Class: Historical    Route: Oral    metoprolol tartrate (LOPRESSOR) 25 MG tablet  60 tablet 11 2021    [facility] #[id number] - [place], [address] AT [address]    Si tablet (25 mg) by Oral or Feeding Tube route 2 times daily    Class: E-Prescribe    Route: Oral or Feeding Tube    multivitamin RENAL (RENAVITE RX/NEPHROVITE) 1 MG tablet  30 tablet 11 2021    [facility] #[id number] - [place], [address] AT [facility] & [facility]    Sig: Take 1 tablet by mouth daily    Class: E-Prescribe    Route: Oral    pantoprazole (PROTONIX) 40 MG EC tablet  30 tablet 11 3/1/2022    [facility] #[id number] - [address] AT [facility] & [facility]    Sig: Take 1 tablet (40 mg) by mouth every morning (before breakfast)    Class: E-Prescribe    Route: Oral    posaconazole (NOXAFIL) 100 MG EC tablet  90 tablet 11 11/15/2021    [facility] #[id number] - [place], [address] AT [facility] & [facility]    Sig: Take 3 tablets (300 mg) by mouth daily    Class: E-Prescribe    Route: Oral    Prior authorization: Closed - Other    predniSONE (DELTASONE) 2.5 MG tablet  30 tablet 11 2021    [id number] - [place], [address] AT [facility] & [facility]    Sig: Take 1 tablet (2.5 mg) by mouth every evening    Class: E-Prescribe    Route: Oral    predniSONE (DELTASONE) 5 MG tablet  30 tablet 11 2021    [id number] - [place], [address] AT [facility] & [facility]    Sig: Take 1 tablet (5 mg) by mouth every morning    Class: E-Prescribe    Route: Oral    sulfamethoxazole-trimethoprim (BACTRIM) 400-80 MG tablet  13 tablet 11 2021    [facility] #[id number] - [place] -  [address] AT [facility] & [facility]    Sig: Take 1 tablet by mouth Every Mon, Wed, Fri Morning    Class: E-Prescribe    Route: Oral    tacrolimus (GENERIC EQUIVALENT) 0.5 MG capsule  120 capsule 11 2/11/2022    [facility] #[id number] - [place] - [address] [address] AT [address]    Sig: Take 2 capsules (1 mg) by mouth 2 times daily    Class: E-Prescribe    Route: Oral        Exam:   Constitutional - A&O in NAD.   Eyes - no redness or discharge.  Sclera anicteric  Respiratory - no cough, no labored breathing  Musculoskeletal - range of motion normal  Skin - no discoloration, no jaundice  Neurological - no tremors.  No facial droop or dysarthria  Psychiatric - normal mood and affect  The rest of a comprehensive physical examination is deferred due to PHE (public health emergency) video visit restrictions     Diagnostics:   Recent Results (from the past 672 hour(s))   Glucose by meter    Collection Time: 02/11/22 10:32 AM   Result Value Ref Range    GLUCOSE BY METER POCT 100 (H) 70 - 99 mg/dL   Actinomyces species culture    Collection Time: 02/11/22  1:08 PM    Specimen: Lung, Lingula; Bronchial Alveolar Lavage   Result Value Ref Range    Culture No Actinomyces isolated    CMV Quantitative, PCR    Collection Time: 02/11/22  1:08 PM    Specimen: Lung, Lingula; Bronchial Alveolar Lavage   Result Value Ref Range    CMV DNA IU/mL Not Detected Not Detected IU/mL   Gram Stain    Collection Time: 02/11/22  1:08 PM    Specimen: Lung, Lingula; Bronchial Alveolar Lavage   Result Value Ref Range    Gram Stain Result <25 PMNs/low power field     Gram Stain Result 1+ Mixed alo    KOH Preparation    Collection Time: 02/11/22  1:08 PM    Specimen: Lung, Lingula; Bronchial Alveolar Lavage   Result Value Ref Range    KOH Preparation No fungal elements seen     KOH Preparation Reference Range: No fungal elements seen.    Nocardia species culture    Collection Time: 02/11/22  1:08 PM    Specimen: Lung,  Lingula; Bronchial Alveolar Lavage   Result Value Ref Range    Culture No growth after 26 days    Fungal or Yeast Culture Routine    Collection Time: 02/11/22  1:08 PM    Specimen: Lung, Lingula; Bronchial Alveolar Lavage   Result Value Ref Range    Culture No growth after 26 days    Cytology, non-gynecologic    Collection Time: 02/11/22  1:08 PM   Result Value Ref Range    Final Diagnosis       Specimen A     Interpretation:      Negative for malignancy     Other Findings:      No fungal or Pneumocystis organisms are identified on GMS stained preparations.    Viral cytopathic effect is absent.     Adequacy:     Satisfactory for evaluation          Comment       Case was reviewed by the following resident: CARTER BELLA      Clinical Information       The patient is a 61 year old female with history of lung transplant.      Gross Description       A. Lung, Right, lingula lavage, Bronchial Alveolar Lavage with GMS:  Received 5 ml of cloudy, colorless fluid, concentrated, resuspended and processed as 2 Pap stained direct smears and 2 GMS stained direct smears.                   Microscopic Description       Microscopic examination was performed.    A resident/fellow in an ACGME accredited training program was involved in the initial review, preparation, and/or interpretation of this case.  I, as the senior physician, attest that I: (i) reviewed patient clinical records if indicated; (ii) reviewed relevant lab test results; (iii) examined the relevant preparation(s) for the specimen(s); and (iv) agree with the report, diagnosis(es), and interpretation as documented by the resident/fellow and edited/confirmed by me. Reporting resident/fellow: Carter Bella, PGY-2      Other Findings Specimen A  Acute inflammation present., Chronic inflammation present., No fungal or Pneumocystis organisms are identified on GMS stained preparations.  Viral cytopathic effect is absent., Viral cytopathic change absent., Mucicarmine  stain is negative for intrace...     No fungal or Pneumocystis organisms are identified on GMS stained preparations.  Viral cytopathic effect is absent.    Performing Labs       The technical component of this testing was completed at [facility]     Acid-Fast Bacilli Culture and Stain    Collection Time: 02/11/22  1:08 PM    Specimen: Lung, Lingula; Bronchial Alveolar Lavage   Result Value Ref Range    Acid Fast Stain No acid fast bacilli seen    Cell Count Body Fluid    Collection Time: 02/11/22  1:08 PM   Result Value Ref Range    Color Colorless Colorless, Yellow    Clarity Cloudy (A) Clear, Bloody    Total Nucleated Cells 63 /uL   Differential Body Fluid    Collection Time: 02/11/22  1:08 PM   Result Value Ref Range    % Neutrophils      % Lymphocytes 12 %    % Monocyte/Macrophages      % Other Cells 88 %   Tacrolimus level    Collection Time: 03/03/22 10:34 AM   Result Value Ref Range    Tacrolimus by Tandem Mass Spectrometry 9.6 5.0 - 15.0 ug/L    Tacrolimus Last Dose Date 3/2/2022     Tacrolimus Last Dose Time 10:30 PM    CBC with platelets differential    Collection Time: 03/03/22 10:54 AM   Result Value Ref Range    WBC Count (External) 7.7 4.5 - 11.0 10(3)/uL    RBC Count (External) 3.50 (L) 4.00 - 5.20 10(6)/uL    Hemoglobin (External) 11.4 (L) 12.0 - 16.0 g/dL    Hematocrit (External) 35.6 33.0 - 51.0 %    MCV (External) 101.7 (H) 80.0 - 100.0 fL    MCH (External) 32.6 26.0 - 34.0 pg    MCHC (External) 32.0 32.0 - 36.0 g/dL    RDW (External) 16.2 (H) 11.5 - 13.1 %    Platelet Count (External) 254 150 - 450 10(3)/uL    % Neutrophils (External) 72.3 35.0 - 77.0 %    % Lymphocytes (External) 12.9 (L) 24.0 - 44.0 %    % Monocytes (External) 11.6 7.0 - 13.0 %    % Eosinophils (External) 1.4 0.0 - 3.0 %    % Basophils (External) 0.5 0.0 - 1.0 %    % Immature Granulocytes (External) 1.3 %    Absolute Neutrophils (External) 5.5 1.5 - 8.5  10(3)/uL    Absolute Lymphocytes (External) 1.0 (L) 1.1 - 5.0 10(3)/uL    Absolute Monocytes (External) 0.9 (H) 0.1 - 0.7 10(3)/uL    Absolute Eosinophils (External) 0.1 0.0 - 0.3 10(3)/uL    Absolute Basophils (External) 0.0 0.0 - 0.1 10(3)/uL    Absolute Immature Granulocytes (External) 0.10 10(3)/uL   Magnesium    Collection Time: 03/03/22 10:54 AM   Result Value Ref Range    Magnesium (External) 1.7 1.6 - 2.6 mg/dL   Phosphorus    Collection Time: 03/03/22 10:54 AM   Result Value Ref Range    Phosphorus (External) 5.6 (H) 2.3 - 4.7 mg/dL   Comprehensive metabolic panel    Collection Time: 03/03/22 10:54 AM   Result Value Ref Range    Sodium (External) 137 136 - 145 mmol/L    Potassium (External) 4.2 3.5 - 5.1 mmol/L    Chloride (External) (External) 96 (L) 98 - 107 mmol/L    CO2 (External) 29 20 - 31 mmol/L    Anion Gap (External) 16.2 mmol/L    Creatinine (External) 4.90 (H) 0.55 - 1.02 mg/dL    Urea Nitrogen (External) 47.3 (H) 7.0 - 26.0 mg/dL    BUN/Creatinine Ratio (External) 9.65 ratio    Calcium (External) 9.1 8.4 - 10.2 mg/dL    Glucose (External) 156 (H) 70 - 105 mg/dL    GFR Estimated (External) 9 (L) >=60 ml/min/1.73m2    Protein Total (External) 7.1 6.4 - 8.3 g/dL    Albumin (External) 3.1 (L) 3.5 - 5.0 g/dL    AST (External) 24 5 - 34 U/L    ALT (External) 49 0 - 55 U/L    Alk Phosphatase (External) 245 (HH) 40 - 150 U/L    Bilirubin Total (External) 0.6 0.2 - 1.2 mg/dL   CMV Quantitative, PCR    Collection Time: 03/03/22 10:54 AM   Result Value Ref Range    CMV DNA Quant (External) <35 (A) Undetected IU/mL    Log IU/ML of CMVQNT (External) <1.54 see scan U/mL   External Lab Results    Collection Time: 03/03/22 10:54 AM   Result Value Ref Range    EBV DNA Quant (External) Undetected Undetected IU/mL    EBV DNA Log of Copies (External) Undetected see scan log IU/mL    EBV PCR Quant DNA Source(External) Plasma      UNOS cPRA   Date Value Ref Range Status   05/01/2021 0  Final       Again, thank you  for allowing me to participate in the care of your patient.        Sincerely,        SAVANNAH

## 2022-03-08 NOTE — PROGRESS NOTES
Psychosocial Assessment For Kidney Transplantation  Patient Name/ Age: Kecia Blue 61 year old   Medical Record #: [id number]  Duration of Interview:     30 min  Process:   Phone Interview                (counseling < 50%)   Present at Appointment: Kecia and spouse Ranjan via phone         : Eugenia STERN [facility]  Outpatient Kidney/Pancreas/Auto Islet Transplant Program   [address]  [place] [address]  [email or web address]  Office: [phone] I Fax: [phone] Date:  March 8, 2022        Type of transplant: Kidney     Donor type:      relative and friend   Prior Transplants:    Yes  Status of Transplant:    Kecia received a lung transplant in 2018. She reports her lung is functioning well.       Current Living Situation    Location:   [address] [place] [address] [facility] [address]  With Whom: lives with their spouse       Family/ Social Support:    Kecia resides with Ranjan in [place], [place]. They share three adult daughters (Yudelka-38, Evelyn-34 and Nasreen-32). Kecia has five grandchildren. 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     Income   Salary/wages, Savings and Spouse's Income   Insurance      At this time, patient can afford medication costs:  Yes  Medicare, BCBS & BCBS RX program        Medical Status    Current Mode of Treatment for ESRD Dialysis   Complications None       Behavioral    Tobacco Use No Chemical Dependency No         Psychiatric Impairment No      Reading Ability: Good  Education Level:  Some College Recent Legal History No      Coping Style/Strategies: Napping, reading and being with her grandchildren        Ability to Adhere to Complex Medical Regime: Yes     Adherence History:        Education  _X_ Medicare  _X_ Rehabilitation  _X_ Donor issues  _X_ Community resources  _X_ Post discharge housing  _X_ Financial resources  _X_ Medical insurance options  _X_ Psych adjustment  _X_ Family adjustment  _X_ Health Care Directive Provided Education   Psychosocial Risks of Transplant Reviewed and Discussed:  _X_ Increased stress related to emotional,            family, social, employment or financial           situation  _X_ Effect on work and/or disability benefits  _X_ Effect on future health and life           insurance  _X_ Transplant outcome expectations may           not be met  _X_ Mental Health Risks: anxiety,           depression, PTSD, guilt, grief and           chronic fatigue     Notable Items:   Hip surgery planned for April. Kecia is using a cane/walker for support due to current hip pain.      Final Evaluation/Assessment   Patient seemed to process information well. Appeared well informed, motivated and able to follow post transplant requirements. Behavior was appropriate during interview. Has adequate income and insurance coverage. Adequate social support. No major contraindications noted for transplant.  At this time patient appears to understand the risks and benefits of transplant.    Kecia presented with euthymic mood, appropriate and engaged in conversation. Kecia and  reported they are very familiar with this whole process after her lung transplant in 2018. She has a large family and vast support system of people to assist post transplant. Kecia enrolled in Medicare post 3 months of dialysis,with the assistance of her dialysis clinic.      Recommendation  Acceptable    Selection Criteria Met:  Plan for support YES  Chemical Dependence No  Smoking No  Mental Health No  Adequate  Finances Yes    Signature: KALEB Moreira, [facility]  ICU    [facility]  Phone: [phone]  Pager: [phone]   Title: Clinical

## 2022-03-08 NOTE — PROGRESS NOTES
Kidney Transplant Referral - 2/14/2022        Summary    Team s concerns/comments: Provider notes not complete    Candidacy category: Yellow    Action/Plan: continue with evaluation     Expected Selection Meeting Discussion: [place] 31/6/2022. Coordinator to follow up after Selection Committee.

## 2022-03-08 NOTE — LETTER
3/8/2022     RE: Kecia Blue  [address] [place] [address]    Dear Colleague,    Thank you for referring your patient, Kecia Blue, to the [facility] TRANSPLANT [facility]IN[facility]. Please see a copy of my visit note below.    Pt evaluated via billable telephone visit d/t COVID-19 restrictions. Time spent: 15 min    [facility] Solid Organ Transplant  Outpatient MNT: Kidney Transplant Evaluation    Current BMI: 21.8 (HT 63 in,  lbs/56 kg)- data from 2/16   BMI is within recommendation of <35 for kidney transplant     Time Spent: 15 minutes  Visit Type: Initial   Referring Physician: Natalia   Pt accompanied by: Ranjan, her      History of previous txp: lung 2018  Dialysis: yes    Dialysis Info: HD 12/2019  Protein supplement: yes     Nutrition Assessment  - Appetite: good   - Food allergies/intolerances: none   - Meal prep & grocery shopping: pt and her    - Previous RD education: yes   - Issues chewing or swallowing: no   - N/V/D/C: no   - Food access concerns: no     Vitamins, Supplements, Pertinent Meds: renavite, phoslo (takes consistently)  Herbal Medicines/Supplements: none     Edema: none     Weight hx:   - weight gain of ~20 lbs over a few years since lung txp; another 10 lbs in the past year, feels healthy at current weight   - 113 lbs during pre lung eval in 2018/BMI 19.5    Diet Recall  Breakfast Toast x 1-2 with PB; 1x/week will have eggs    Lunch [place] (w/ cheese and deli meat or fresh meat leftovers)- deli counter + occasional chips/crackers    Dinner Spaghetti, tacos, pizza, steak, chicken, fish; some potato and veggies (not daily)   Snacks Candy, cookies, peanuts/almonds/cashews    Beverages Juice (orange, not daily, 6 oz), milk (16 oz/day), water, sweetened iced tea (12 oz a few times/week)    Alcohol None    Dining out 2x/month      Physical Activity  None; needs knee replacement; plan for replacement     Labs  3/3 K 4.2   Phos 5.6   A1c 5.8 in  Sept    Nutrition Diagnosis  No nutrition diagnosis identified at this time     Nutrition Intervention  Nutrition education provided:  Discussed sodium intake (low sodium foods and drinks, seasoning food without salt and tips for low sodium diet).  Reviewed wnl K level with slightly high Phos level. She reports taking her binders as prescribed. Reduce milk consumption to 4-8 oz/day, look for lower Na deli meat, preferably pre-packaged to reduce risk for Listeria. Recommend taking binders w/ snacks as well (ie nuts).     Reviewed post txp diet guidelines in brief (will review in further detail post txp):  (1) Review of proper food safety measures d/t immunosuppressant therapy post-op and increased risk for food-borne illness    (2) Avoid the following post txp d/t risk for rejection, unknown effects on the organs, and/or potential interactions with immunosuppressants:  - Herbal, Chinese, holistic, chiropractic, natural, alternative medicines and supplements  - Detoxes and cleanses  - Weight loss pills  - Protein powders or other products with extracts or herbs (ie green tea extract)    (3) Med regimen and possible side effects    Patient Understanding: Pt verbalized understanding of education provided.  Expected Engagement: Good  Follow-Up Plans: PRN     Nutrition Goals  No nutrition goals identified at this time     Yudelka Ewing, RD, LD, CCTD

## 2022-03-08 NOTE — LETTER
3/8/2022     RE: Kecia Blue  [address] [place] [address]    Dear Colleague,    Thank you for referring your patient, Kecia Blue, to the [facility] TRANSPLANT CLINIC. Please see a copy of my visit note below.    Psychosocial Assessment For Kidney Transplantation  Patient Name/ Age: Kecia Blue 61 year old   Medical Record #: [id number]  Duration of Interview:     30 min  Process:   Phone Interview                (counseling < 50%)   Present at Appointment: Kecia and spouse Ranjan via phone         : Eugenia Pearson   [facility]  Outpatient Kidney/Pancreas/Auto Islet Transplant Program   [address] [place] [address] [place] [id number]  [email or web address]  Office: [phone] I Fax: [phone] Date:  March 8, 2022        Type of transplant: Kidney     Donor type:      relative and friend   Prior Transplants:    Yes  Status of Transplant:    Kecia received a lung transplant in 2018. She reports her lung is functioning well.       Current Living Situation    Location:   [address] [place] [address] [address] [place] [address]  With Whom: lives with their spouse       Family/ Social Support:    Kecia resides with Ranjan in [place], [place]. They share three adult daughters (Yudelka-38, Evelyn-34 and Nasreen-32). Kecia has five grandchildren. 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     Income   Salary/wages, Savings and Spouse's Income   Insurance      At this time, patient can afford medication costs:  Yes   Medicare, BCBS & BCBS RX program        Medical Status    Current Mode of Treatment for ESRD Dialysis   Complications None       Behavioral    Tobacco Use No Chemical Dependency No         Psychiatric Impairment No      Reading Ability: Good  Education Level: Some College Recent Legal History No      Coping Style/Strategies: Napping, reading and being with her grandchildren        Ability to Adhere to Complex Medical Regime: Yes     Adherence History:        Education  _X_ Medicare  _X_ Rehabilitation  _X_ Donor issues  _X_ Community resources  _X_ Post discharge housing  _X_ Financial resources  _X_ Medical insurance options  _X_ Psych adjustment  _X_ Family adjustment  _X_ Health Care Directive Provided Education   Psychosocial Risks of Transplant Reviewed and Discussed:  _X_ Increased stress related to emotional,            family, social, employment or financial           situation  _X_ Effect on work and/or disability benefits  _X_ Effect on future health and life           insurance  _X_ Transplant outcome expectations may           not be met  _X_ Mental Health Risks: anxiety,           depression, PTSD, guilt, grief and           chronic fatigue     Notable Items:   Hip surgery planned for April. Kecia is using a cane/walker for support due to current hip pain.      Final Evaluation/Assessment   Patient seemed to process information well. Appeared well informed, motivated and able to follow post transplant requirements. Behavior was appropriate during interview. Has adequate income and insurance coverage. Adequate social support. No major contraindications noted for transplant.  At this time patient appears to understand the risks and benefits of transplant.    Kecia presented with euthymic mood, appropriate and engaged in conversation. Kecia and  reported they are very familiar with this whole process after her lung transplant in 2018. She has a large family and vast support system of people to  assist post transplant. Kecia enrolled in Medicare post 3 months of dialysis,with the assistance of her dialysis clinic.      Recommendation  Acceptable    Selection Criteria Met:  Plan for support YES  Chemical Dependence No  Smoking No  Mental Health No  Adequate Finances Yes    Signature: KALEB Moreira, LGSW  ICU     [facility]  Phone: [phone]  Pager: [phone]   Title: Clinical        Again, thank you for allowing me to participate in the care of your patient.        Sincerely,        SAVANNAH

## 2022-03-08 NOTE — PROGRESS NOTES
"Chief Complaint   Patient presents with     Transplant Evaluation     kidney     Weight 57.2 kg (126 lb), last menstrual period 06/07/2014, not currently breastfeeding.      Kecia is a 61 year old who is being evaluated via a billable video visit.      How would you like to obtain your AVS? MyChart  If the video visit is dropped, the invitation should be resent by: Other e-mail: [email or web address]  [phone]  Will anyone else be joining your video visit? No  {If patient encounters technical issues they should call [phone] :[id number]}    Video Start Time: {video visit start/end time for provider to select:201958}  Video-Visit Details    Type of service:  Video Visit    Video End Time:{video visit start/end time for provider to select:582158}    Originating Location (pt. Location): {video visit patient location:890927::\"Home\"}    Distant Location (provider location):  [facility] TRANSPLANT CLINIC     Platform used for Video Visit: {Virtual Visit Platforms:597577::\"Copier How To\"}      "

## 2022-03-10 ENCOUNTER — TELEPHONE (OUTPATIENT)
Dept: FAMILY MEDICINE | Facility: CLINIC | Age: 62
End: 2022-03-10
Payer: MEDICARE

## 2022-03-10 NOTE — PROGRESS NOTES
Video-Visit Details          Type of service:  Video Visit 3 8     Video Start Time: 11am  Video End Time: 12:05pm    Originating Location (pt. Location): home     Distant Location (provider location):  [facility]     Platform used for Video Visit: doximity    Transplant Surgery Consult Note     Medical record number: [id number]  YOB: 1960,   Consult requested by Dr. Chow for evaluation of kidney transplant candidacy.    Assessment and Recommendations:Ms. Blue appears to be a good candidate for kidney transplantation and has a good understanding of the risks and benefits of this approach to the management of renal failure. The following issues should be addressed prior to finalizing her transplant candidacy:     Ms. 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    Dietician consult ordered: Yes  Social work consult ordered: Yes  Transplant listing labs ordered to include HLA, ABOx2, Cr, etc.  Obtain past medical records  Up-to-date cancer screening  Infectious disease evaluation/consult re acceptability for transplant  Hepatology consult      The majority of our visit was spent in counselling, discussing the medical and surgical risks of kidney transplantation. We talked about the pros and cons of transplantation vs. dialysis.  We discussed the fact that it was important to think about the pros and cons of each treatment option and make an active decision.  We also discussed the fact that the two were interconnected and that if the transplant failed, it is possible that dialysis might be necessary before another transplant.  We also discussed the fact that she was already on immunosuppressive therapy and so the major risk of a kidney transplant was the surgery itself.    We also discussed the fact that  "if choosing to have a transplant, a second important decision was a living versus a  donor transplant.  We talked about the pros and cons of each option - the advantage of a  donor transplant being not asking someone to go through the living donor operation, the disadvantage, 5-6 years waiting; the advantage of a living donor transplant, much shorter time to transplant and significantly better long-term outcomes, the disadvantage being the risk to the donor.  Although I didn't express an opinion regarding transplantation or dialysis, I suggested that if opting for a transplant, we would strongly recommend a living donor transplant.  She says there are a number of potential donors.    We discussed living donor risks and the evaluation process. We discussed the fact that a living donor does not have to be a direct match and that if there was a donor who met the criteria but was not a match, there was an option of participating in the national paired exchange system.  I described how the system would work.    I also discussed the new ( donor) kidney (KDPI) scoring system. We discussed the advantages and disadvantages of accepting an \"expanded criteria\" donor kidney, and the latest data as to who potentially benefits - those >45 and/or having diabetes a cause for ESKD - by receiving an expanded criteria donor kidney versus waiting longer for a standard criteria donor. Based on her age, i recommended she say \"yes\" to a high KDPI kidney.    They had a number of questions - about the surgery itself, the placement of the kidney; donor recovery and long-term limitations; recipient recovery, limitations, and need to be close to the transplant centers - which I answered.  Ms. Blue asked good questions and her candidacy will be reviewed at our Multidisciplinary Selection Committee. Thank you for the opportunity to participate in Ms. Blue's care.    65 min spent on the date of the encounter in " chart review, patient visit,  documentation and/or discussion with other providers about the issues documented above.          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 difficile colitis      Cytomegalovirus (CMV) viremia (H)      Dermatomyositis (H)      Dysplasia of cervix, low grade (ESTRADA 1)      EBV (Franklin-Barr virus) viremia      ESRD (end stage renal disease) on dialysis (H)      ILD (interstitial lung disease) (H)      Lung replaced by transplant (H)      Osteopenia      Paroxysmal atrial fibrillation (H)      Pneumocystis jiroveci pneumonia (H)      PONV (postoperative nausea and vomiting)      Post-menopause      Pulmonary hypertension (H)      Raynaud's disease      Seronegative rheumatoid arthritis (H)      Past Surgical History:   Procedure Laterality Date     BRONCHOSCOPY (RIGID OR FLEXIBLE), DIAGNOSTIC N/A 4/10/2018    Procedure: COMBINED BRONCHOSCOPY (RIGID OR FLEXIBLE), LAVAGE;;  Surgeon: Mariposa Donohue MD;  Location:  GI     BRONCHOSCOPY (RIGID OR FLEXIBLE), DIAGNOSTIC N/A 12/23/2020    Procedure: BRONCHOSCOPY, WITH BRONCHOALVEOLAR LAVAGE;  Surgeon: Uri Bass MD;  Location:  GI     BRONCHOSCOPY (RIGID OR FLEXIBLE), DILATE BRONCHUS / TRACHEA N/A 10/11/2018     Procedure: BRONCHOSCOPY (RIGID OR FLEXIBLE), DILATE BRONCHUS / TRACHEA;  Flexible And Rigid Bronchoscopy and Dilation;  Surgeon: Wilber Lin MD;  [facility]     BRONCHOSCOPY FLEXIBLE N/A 3/13/2018    Procedure: BRONCHOSCOPY FLEXIBLE;  Flexible Bronchoscopy ;  Surgeon: Gissell Sanchez MD;  [facility]     BRONCHOSCOPY FLEXIBLE N/A 5/9/2018    Procedure: BRONCHOSCOPY FLEXIBLE;;  Surgeon: Wilber Lin MD;  [facility]     BRONCHOSCOPY FLEXIBLE AND RIGID N/A 9/10/2018    Procedure: BRONCHOSCOPY FLEXIBLE AND RIGID;  Flexible and Rigid Bronchoscopy with Balloon Dilation, tissue debulking with cryo, and Right mainstem bronchus stent placement;  Surgeon: Wilber Lin MD;  [facility]     BRONCHOSCOPY RIGID N/A 6/6/2018    Procedure: BRONCHOSCOPY RIGID;;  Surgeon: Lopez Macias MD;  [facility]     BRONCHOSCOPY, DILATE BRONCHUS, STENT BRONCHUS, COMBINED N/A 6/11/2018    Procedure: COMBINED BRONCHOSCOPY, DILATE BRONCHUS, STENT BRONCHUS;  Flexible Bronchoscopy, Balloon Dilation, Bronchial Washings;  Surgeon: Wilber Lin MD;  [facility]     BRONCHOSCOPY, DILATE BRONCHUS, STENT BRONCHUS, COMBINED Right 7/10/2018    Procedure: COMBINED BRONCHOSCOPY, DILATE BRONCHUS, STENT BRONCHUS;  Flexible Bronchoscopy, Balloon Dilation, Bronchial Washings  ;  Surgeon: Wilber Lin MD;  [facility]     BRONCHOSCOPY, DILATE BRONCHUS, STENT BRONCHUS, COMBINED N/A 8/2/2018    Procedure: COMBINED BRONCHOSCOPY, DILATE BRONCHUS, STENT BRONCHUS;  Flexible Bronchoscopy, Bronchial Washings, Balloon Dilation;  Surgeon: Wilber Lin MD;  [facility]     BRONCHOSCOPY, DILATE BRONCHUS, STENT BRONCHUS, COMBINED N/A 8/20/2018    Procedure: COMBINED BRONCHOSCOPY, DILATE BRONCHUS, STENT BRONCHUS;  Flexible Bronchoscopy, Balloon Dilation;  Surgeon: Wilber Lin MD;  [facility]     BRONCHOSCOPY, DILATE BRONCHUS, STENT BRONCHUS, COMBINED  N/A 10/29/2018    Procedure: Flexible Bronchoscopy, Balloon Dilation, Stent Revision, Airway Examination And Therapeutic Suctioning, Cyro Tumor Debulking;  Surgeon: Wilber Lin MD;  [facility]     BRONCHOSCOPY, DILATE BRONCHUS, STENT BRONCHUS, COMBINED N/A 11/20/2018    Procedure: Rigid Bronchoscopy, Stent Removal and dilitation;  Surgeon: Wilber Lin MD;  [facility]     BRONCHOSCOPY, DILATE BRONCHUS, STENT BRONCHUS, COMBINED N/A 12/14/2018    Procedure: Flexible And Rigid Bronchoscopy, Balloon Dilation, Bronchial Washing;  Surgeon: Wilber Lin MD;  [facility]     BRONCHOSCOPY, DILATE BRONCHUS, STENT BRONCHUS, COMBINED N/A 1/17/2019    Procedure: Flexible And Rigid Bronchoscopy, Balloon Dilation.;  Surgeon: Wilber Lin MD;  [facility]     BRONCHOSCOPY, DILATE BRONCHUS, STENT BRONCHUS, COMBINED N/A 3/7/2019    Procedure: Flexible and Rigid Bronchoscopy, Bronchial Washing, Balloon Dilation;  Surgeon: Wilber Lin MD;  [facility]     BRONCHOSCOPY, DILATE BRONCHUS, STENT BRONCHUS, COMBINED N/A 6/6/2019    Procedure: Rigid and Flexible Bronchoscopy, Balloon Dilation;  Surgeon: Wilber Lin MD;  [facility]     BRONCHOSCOPY, DILATE BRONCHUS, STENT BRONCHUS, COMBINED N/A 10/11/2019    Procedure: Flexible and Rigid Bronchoscopy, Balloon Dilation, Bronchoalveolar Lagave;  Surgeon: Wilber Lin MD;  [facility]     BRONCHOSCOPY, DILATE BRONCHUS, STENT BRONCHUS, COMBINED N/A 2/19/2021    Procedure: BRONCHOSCOPY, flexible, airway dilation, and bronchial wash;  Surgeon: Wilber Lin MD;  [facility]     BRONCHOSCOPY, DILATE BRONCHUS, STENT BRONCHUS, COMBINED N/A 4/9/2021    Procedure: BRONCHOSCOPY, flexible and rigid, Airway suctioning;  Surgeon: Mati Norris MD;  [facility]     CV RIGHT HEART CATH MEASUREMENTS RECORDED N/A 3/10/2020    Procedure: CV RIGHT HEART CATH;  Surgeon: Wai Garcia MD;   [facility]     ENT SURGERY      tonsillectomy as a child     ESOPHAGOSCOPY, GASTROSCOPY, DUODENOSCOPY (EGD), COMBINED N/A 10/29/2018    Procedure: COMBINED ESOPHAGOSCOPY, GASTROSCOPY, DUODENOSCOPY (EGD) with biopsies and polypectomy;  Surgeon: Chente Bloom MD;  [facility]     INSERT EXTRACORPORAL MEMBRANE OXYGENATOR ADULT (OUTSIDE OR) N/A 2/27/2018    Procedure: INSERT EXTRACORPORAL MEMBRANE OXYGENATOR ADULT (OUTSIDE OR);  INSERT EXTRACORPORAL MEMBRANE OXYGENATOR ADULT (OUTSIDE OR) ;  Surgeon: Toby Hernandez MD;  [facility]     IR CVC TUNNEL PLACEMENT > 5 YRS OF AGE  10/25/2019     IR DIALYSIS FISTULOGRAM LEFT  3/2/2021     IR DIALYSIS MECH THROMB, PTA  3/2/2021     IR FLUORO 0-1 HOUR  5/7/2021     IR GASTRO JEJUNOSTOMY TUBE PLACEMENT  2/16/2021     IR PARACENTESIS  1/8/2020     IR THORACENTESIS  9/13/2019     IR TRANSCATHETER BIOPSY  1/8/2020     LASER CO2 BRONCHOSCOPY N/A 4/30/2021    Procedure: Flexible and Rigid Bronchoscopy and Tissue Debulking with CO2 Laser Assistance;  Surgeon: Mati Norris MD;  [facility]     LASER CO2 BRONCHOSCOPY N/A 6/11/2021    Procedure: BRONCHOSCOPY, Flexible and Rigid Bronchoscopy, Tissue Debulking with cryo Assistance, airway dilation,;  Surgeon: Mati Norris MD;  [facility]     LASER CO2 BRONCHOSCOPY N/A 9/16/2021    Procedure: BRONCHOSCOPY, flexible and rigid, CO2 Laser Debulking;  Surgeon: Mati Norris MD;  [facility]     LASER CO2 BRONCHOSCOPY N/A 2/11/2022    Procedure: flexible, rigid bronchoscopy, tissue debulking, airway dilation, co2 laser, bronchoalveolar lavage;  Surgeon: Juana Baugh MD;  Location:  OR     no prior surgery       REMOVE EXTRACORPORAL MEMBRANE OXYGENATOR ADULT N/A 3/3/2018    Procedure: REMOVE EXTRACORPORAL MEMBRANE OXYGENATOR ADULT;  Removal of Right Femoral Venous and Right Axillary Arterial Extracorporeal Membrane Oxygenator;  Surgeon: Toby Hernandez,  MD;  [facility]     TRANSPLANT LUNG RECIPIENT SINGLE X2 Bilateral 3/1/2018    Procedure: TRANSPLANT LUNG RECIPIENT SINGLE X2;  Median Sternotomy, Extracorporeal Membrane Oxygenator, bilateral sequential lung transplant;  Surgeon: Toby Hernandez MD;  Location:  OR     Family History   Problem Relation Age of Onset     Hypertension Mother      Arthritis Mother      Pancreatic Cancer Father      Diabetes Father      Social History     Socioeconomic History     Marital status:      Spouse name: Not on file     Number of children: Not on file     Years of education: Not on file     Highest education level: Not on file   Occupational History     Not on file   Tobacco Use     Smoking status: Never Smoker     Smokeless tobacco: Never Used   Substance and Sexual Activity     Alcohol use: No     Alcohol/week: 1.0 standard drink     Types: 1 Glasses of wine per week     Drug use: No     Sexual activity: Not on file   Other Topics Concern     Parent/sibling w/ CABG, MI or angioplasty before 65F 55M? No   Social History Narrative    3/6/2019 - Lives with . Has three daughters. Four grandchildren (two ). No pets. Travelled previously to [place] [place]. Has visited [place] several times.      Social Determinants of Health     Financial Resource Strain: Not on file   Food Insecurity: Not on file   Transportation Needs: Not on file   Physical Activity: Not on file   Stress: Not on file   Social Connections: Not on file   Intimate Partner Violence: Not on file   Housing Stability: Not on file       ROS:   CONSTITUTIONAL:  No fevers or chills  EYES: negative for icterus  ENT:  negative for hearing loss, tinnitus and sore throat  RESPIRATORY:  negative for cough, sputum, dyspnea  CARDIOVASCULAR:  negative for chest pain  GASTROINTESTINAL:  negative for nausea, vomiting, diarrhea or constipation  GENITOURINARY:  negative for incontinence, dysuria, bladder emptying problems  HEME:  No easy  bruising  INTEGUMENT:  negative for rash and pruritus  NEURO:  Negative for headache, seizure disorder  Allergies:   No Known Allergies  Medications:  Prescription Medications as of 3/10/2022       Rx Number Disp Refills Start End Last Dispensed Date Next Fill Date Owning Pharmacy    acetaminophen (TYLENOL) 325 MG tablet  60 tablet  2/25/2021        Sig: Take 1 tablet (325 mg) by mouth every 4 hours as needed for mild pain or fever    Class: No Print Out    Route: Oral    albuterol (PROVENTIL) (2.5 MG/3ML) 0.083% neb solution  360 mL 4 2/10/2022    [facility] #[id number] - [place], [place] - [address] [place] [address] AT [address]    Sig: Take 1 vial (2.5 mg) by nebulization daily    Class: Historical    Route: Nebulization    azaTHIOprine (IMURAN) 50 MG tablet  45 tablet 3 3/3/2022    [facility] #[id number] - [place] AT [address]    Sig: Take 0.5 tablets (25 mg) by mouth daily    Class: E-Prescribe    Notes to Pharmacy: TXP DT 3/1/2018 (Lung) TXP Dischg DT  DX Lung replaced by transplant Z94.2 TX [facility] ([place], [place])    Route: Oral    budesonide (PULMICORT) 0.5 MG/2ML neb solution  60 mL 11 2/10/2022    [facility] #[id number] - [place] AT [address]    Sig: Take 2 mLs (0.5 mg) by nebulization daily    Class: Historical    Route: Nebulization    calcium acetate (PHOSLO) 667 MG CAPS capsule        [facility] #[id number] - [place] AT [address]    Sig: Take 667 mg by mouth daily with food (largest meal)    Class: Historical    Route: Oral    Magnesium Cl-Calcium Carbonate (SLOW-MAG) 71.5-119 MG TBEC  90 tablet 3 4/8/2021    [facility] #[id number] - [place], [place] - [address] [facility] AT [facility]    Sig: Take 3 tablets by mouth four times a week Take on non dialysis days T/Th/Sa/Su    Class: Historical    Route: Oral     metoprolol tartrate (LOPRESSOR) 25 MG tablet  60 tablet 11 2021    [facility] #[id number] - [place], [address] AT [facility] & [facility]    Si tablet (25 mg) by Oral or Feeding Tube route 2 times daily    Class: E-Prescribe    Route: Oral or Feeding Tube    multivitamin RENAL (RENAVITE RX/NEPHROVITE) 1 MG tablet  30 tablet 11 2021    [facility] #[id number] - [place] AT [facility] & [facility]    Sig: Take 1 tablet by mouth daily    Class: E-Prescribe    Route: Oral    pantoprazole (PROTONIX) 40 MG EC tablet  30 tablet 11 3/1/2022    [facility] #[id number] - [place] AT [facility] & [facility]    Sig: Take 1 tablet (40 mg) by mouth every morning (before breakfast)    Class: E-Prescribe    Route: Oral    posaconazole (NOXAFIL) 100 MG EC tablet  90 tablet 11 11/15/2021    [facility] #[id number] - [place] AT [facility] & [facility]    Sig: Take 3 tablets (300 mg) by mouth daily    Class: E-Prescribe    Route: Oral    Prior authorization: Closed - Other    predniSONE (DELTASONE) 2.5 MG tablet  30 tablet 11 2021    [facility] #[id number] - [place] AT [facility] & [facility]    Sig: Take 1 tablet (2.5 mg) by mouth every evening    Class: E-Prescribe    Route: Oral    predniSONE (DELTASONE) 5 MG tablet  30 tablet 11 2021    [facility] #[id number] - [place] AT [facility] & [facility]    Sig: Take 1 tablet (5 mg) by mouth every morning    Class: E-Prescribe    Route: Oral    sulfamethoxazole-trimethoprim (BACTRIM) 400-80 MG tablet  13 tablet 11 2021    [facility] #[id number] - [place] AT [facility] & [facility]    Sig: Take 1 tablet by mouth Every Mon, Wed, Fri Morning    Class: E-Prescribe    Route: Oral    tacrolimus (GENERIC EQUIVALENT) 0.5 MG capsule  120 capsule 11 2022    [facility] #[id number] -  [place], [place] - [address] [facility] AT [facility] [address]    Sig: Take 2 capsules (1 mg) by mouth 2 times daily    Class: E-Prescribe    Route: Oral        Exam:   Constitutional - A&O in NAD.   Eyes - no redness or discharge.  Sclera anicteric  Respiratory - no cough, no labored breathing  Musculoskeletal - range of motion normal  Skin - no discoloration, no jaundice  Neurological - no tremors.  No facial droop or dysarthria  Psychiatric - normal mood and affect  The rest of a comprehensive physical examination is deferred due to PHE (public health emergency) video visit restrictions     Diagnostics:   Recent Results (from the past 672 hour(s))   Glucose by meter    Collection Time: 02/11/22 10:32 AM   Result Value Ref Range    GLUCOSE BY METER POCT 100 (H) 70 - 99 mg/dL   Actinomyces species culture    Collection Time: 02/11/22  1:08 PM    Specimen: Lung, Lingula; Bronchial Alveolar Lavage   Result Value Ref Range    Culture No Actinomyces isolated    CMV Quantitative, PCR    Collection Time: 02/11/22  1:08 PM    Specimen: Lung, Lingula; Bronchial Alveolar Lavage   Result Value Ref Range    CMV DNA IU/mL Not Detected Not Detected IU/mL   Gram Stain    Collection Time: 02/11/22  1:08 PM    Specimen: Lung, Lingula; Bronchial Alveolar Lavage   Result Value Ref Range    Gram Stain Result <25 PMNs/low power field     Gram Stain Result 1+ Mixed alo    KOH Preparation    Collection Time: 02/11/22  1:08 PM    Specimen: Lung, Lingula; Bronchial Alveolar Lavage   Result Value Ref Range    KOH Preparation No fungal elements seen     KOH Preparation Reference Range: No fungal elements seen.    Nocardia species culture    Collection Time: 02/11/22  1:08 PM    Specimen: Lung, Lingula; Bronchial Alveolar Lavage   Result Value Ref Range    Culture No growth after 26 days    Fungal or Yeast Culture Routine    Collection Time: 02/11/22  1:08 PM    Specimen: Lung, Lingula; Bronchial Alveolar Lavage   Result Value Ref Range     Culture No growth after 26 days    Cytology, non-gynecologic    Collection Time: 02/11/22  1:08 PM   Result Value Ref Range    Final Diagnosis       Specimen A     Interpretation:      Negative for malignancy     Other Findings:      No fungal or Pneumocystis organisms are identified on GMS stained preparations.    Viral cytopathic effect is absent.     Adequacy:     Satisfactory for evaluation          Comment       Case was reviewed by the following resident: CARTER BELLA      Clinical Information       The patient is a 61 year old female with history of lung transplant.      Gross Description       A. Lung, Right, lingula lavage, Bronchial Alveolar Lavage with GMS:  Received 5 ml of cloudy, colorless fluid, concentrated, resuspended and processed as 2 Pap stained direct smears and 2 GMS stained direct smears.                   Microscopic Description       Microscopic examination was performed.    A resident/fellow in an ACGME accredited training program was involved in the initial review, preparation, and/or interpretation of this case.  I, as the senior physician, attest that I: (i) reviewed patient clinical records if indicated; (ii) reviewed relevant lab test results; (iii) examined the relevant preparation(s) for the specimen(s); and (iv) agree with the report, diagnosis(es), and interpretation as documented by the resident/fellow and edited/confirmed by me. Reporting resident/fellow: Carter Bella, PGY-2      Other Findings Specimen A  Acute inflammation present., Chronic inflammation present., No fungal or Pneumocystis organisms are identified on GMS stained preparations.  Viral cytopathic effect is absent., Viral cytopathic change absent., Mucicarmine stain is negative for intrace...     No fungal or Pneumocystis organisms are identified on GMS stained preparations.  Viral cytopathic effect is absent.    Performing Labs       The technical component of this testing was completed at [facility]  [facility]     Acid-Fast Bacilli Culture and Stain    Collection Time: 02/11/22  1:08 PM    Specimen: Lung, Lingula; Bronchial Alveolar Lavage   Result Value Ref Range    Acid Fast Stain No acid fast bacilli seen    Cell Count Body Fluid    Collection Time: 02/11/22  1:08 PM   Result Value Ref Range    Color Colorless Colorless, Yellow    Clarity Cloudy (A) Clear, Bloody    Total Nucleated Cells 63 /uL   Differential Body Fluid    Collection Time: 02/11/22  1:08 PM   Result Value Ref Range    % Neutrophils      % Lymphocytes 12 %    % Monocyte/Macrophages      % Other Cells 88 %   Tacrolimus level    Collection Time: 03/03/22 10:34 AM   Result Value Ref Range    Tacrolimus by Tandem Mass Spectrometry 9.6 5.0 - 15.0 ug/L    Tacrolimus Last Dose Date 3/2/2022     Tacrolimus Last Dose Time 10:30 PM    CBC with platelets differential    Collection Time: 03/03/22 10:54 AM   Result Value Ref Range    WBC Count (External) 7.7 4.5 - 11.0 10(3)/uL    RBC Count (External) 3.50 (L) 4.00 - 5.20 10(6)/uL    Hemoglobin (External) 11.4 (L) 12.0 - 16.0 g/dL    Hematocrit (External) 35.6 33.0 - 51.0 %    MCV (External) 101.7 (H) 80.0 - 100.0 fL    MCH (External) 32.6 26.0 - 34.0 pg    MCHC (External) 32.0 32.0 - 36.0 g/dL    RDW (External) 16.2 (H) 11.5 - 13.1 %    Platelet Count (External) 254 150 - 450 10(3)/uL    % Neutrophils (External) 72.3 35.0 - 77.0 %    % Lymphocytes (External) 12.9 (L) 24.0 - 44.0 %    % Monocytes (External) 11.6 7.0 - 13.0 %    % Eosinophils (External) 1.4 0.0 - 3.0 %    % Basophils (External) 0.5 0.0 - 1.0 %    % Immature Granulocytes (External) 1.3 %    Absolute Neutrophils (External) 5.5 1.5 - 8.5 10(3)/uL    Absolute Lymphocytes (External) 1.0 (L) 1.1 - 5.0 10(3)/uL    Absolute Monocytes (External) 0.9 (H) 0.1 - 0.7 10(3)/uL    Absolute Eosinophils (External) 0.1 0.0 - 0.3 10(3)/uL    Absolute Basophils (External) 0.0 0.0 - 0.1 10(3)/uL     Absolute Immature Granulocytes (External) 0.10 10(3)/uL   Magnesium    Collection Time: 03/03/22 10:54 AM   Result Value Ref Range    Magnesium (External) 1.7 1.6 - 2.6 mg/dL   Phosphorus    Collection Time: 03/03/22 10:54 AM   Result Value Ref Range    Phosphorus (External) 5.6 (H) 2.3 - 4.7 mg/dL   Comprehensive metabolic panel    Collection Time: 03/03/22 10:54 AM   Result Value Ref Range    Sodium (External) 137 136 - 145 mmol/L    Potassium (External) 4.2 3.5 - 5.1 mmol/L    Chloride (External) (External) 96 (L) 98 - 107 mmol/L    CO2 (External) 29 20 - 31 mmol/L    Anion Gap (External) 16.2 mmol/L    Creatinine (External) 4.90 (H) 0.55 - 1.02 mg/dL    Urea Nitrogen (External) 47.3 (H) 7.0 - 26.0 mg/dL    BUN/Creatinine Ratio (External) 9.65 ratio    Calcium (External) 9.1 8.4 - 10.2 mg/dL    Glucose (External) 156 (H) 70 - 105 mg/dL    GFR Estimated (External) 9 (L) >=60 ml/min/1.73m2    Protein Total (External) 7.1 6.4 - 8.3 g/dL    Albumin (External) 3.1 (L) 3.5 - 5.0 g/dL    AST (External) 24 5 - 34 U/L    ALT (External) 49 0 - 55 U/L    Alk Phosphatase (External) 245 (HH) 40 - 150 U/L    Bilirubin Total (External) 0.6 0.2 - 1.2 mg/dL   CMV Quantitative, PCR    Collection Time: 03/03/22 10:54 AM   Result Value Ref Range    CMV DNA Quant (External) <35 (A) Undetected IU/mL    Log IU/ML of CMVQNT (External) <1.54 see scan U/mL   External Lab Results    Collection Time: 03/03/22 10:54 AM   Result Value Ref Range    EBV DNA Quant (External) Undetected Undetected IU/mL    EBV DNA Log of Copies (External) Undetected see scan log IU/mL    EBV PCR Quant DNA Source(External) Plasma      [facility] cPRA   Date Value Ref Range Status   05/01/2021 0  Final

## 2022-03-10 NOTE — TELEPHONE ENCOUNTER
Patient was contacted regarding Evusheld treatment and is not interested in scheduling the 2nd dose.

## 2022-03-11 LAB
BACTERIA BRONCH: NO GROWTH
BACTERIA BRONCH: NO GROWTH

## 2022-03-14 NOTE — ADDENDUM NOTE
-Chronic and stable  -Continue mirtazapine 30mg nightly to treat.    Addended by: KIMBERLI ARCHER on: 3/14/2022 03:34 PM     Modules accepted: Orders

## 2022-03-28 ENCOUNTER — LAB (OUTPATIENT)
Dept: LAB | Facility: CLINIC | Age: 62
End: 2022-03-28
Payer: MEDICARE

## 2022-03-28 ENCOUNTER — TRANSFERRED RECORDS (OUTPATIENT)
Dept: HEALTH INFORMATION MANAGEMENT | Facility: CLINIC | Age: 62
End: 2022-03-28

## 2022-03-28 ENCOUNTER — TELEPHONE (OUTPATIENT)
Dept: TRANSPLANT | Facility: CLINIC | Age: 62
End: 2022-03-28
Payer: MEDICARE

## 2022-03-28 DIAGNOSIS — Z94.2 S/P LUNG TRANSPLANT (H): ICD-10-CM

## 2022-03-28 PROCEDURE — 80197 ASSAY OF TACROLIMUS: CPT | Performed by: INTERNAL MEDICINE

## 2022-03-28 NOTE — TELEPHONE ENCOUNTER
DATE:  3/28/2022     TIME OF RECEIPT FROM LAB:  12:25    ORDERING PROVIDER: Ame Chow    LAB TEST:  Alkaline phosphate    LAB VALUE:  278    LAB TEST:  creatinine    LAB VALUE:  7.2    LAB TEST:  BUN    LAB VALUE:  80.9    RESULTS GIVEN WITH READ-BACK TO (PROVIDER): Mikayla Latham    TIME LAB VALUE REPORTED TO PROVIDER:   12:35

## 2022-03-29 LAB
TACROLIMUS BLD-MCNC: 6.9 UG/L (ref 5–15)
TME LAST DOSE: NORMAL H
TME LAST DOSE: NORMAL H

## 2022-03-30 ENCOUNTER — TELEPHONE (OUTPATIENT)
Dept: TRANSPLANT | Facility: CLINIC | Age: 62
End: 2022-03-30
Payer: MEDICARE

## 2022-03-30 NOTE — PROGRESS NOTES
TRANSPLANT NEPHROLOGY RECIPIENT EVALUATION NOTE    Assessment and Plan:  Kecia appears to be a good candidate for kidney transplantation. Post-lung transplantation, she has tolerated immunosuppression reasonably well, aside from mycophenolate, and would benefit from kidney transplantation. An outlying question is that of her portal hypertension and liver disease. Though limited virtually, based on her report, lab evaluation, her liver function appears well compensated at this time. She would likely benefit from hepatology clearance/re-evaulation of her portal hypertension/congestive hepatopathy prior to kidney transplantation. She is vaccinated against COVID19. 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 She would benefit from living donor evaluations and pursuing this avenue if possible so as to minimize immunosuppression needs. Infectious disease follows her closely and cleared for kidney transplant.     Recommendations:   -Workup of living donors, pursuing living donation to minimize immunosuppression needs given prior infectious history     -In person visit to assess frailty    -Hepatology clearance/reevaulation of her portal hypertension    -Pharmacologic nuclear med stress test for cardiac evaluation     -Cleared from ID perspective by Dr. Espinoza in visit 2/1/2022. Given PJP history would likely benefit from lifelong PJP prophylaxis.     -Update COVID vaccination per CDC guidelines prior to transplant/Evusheld as indicated    -Vascular assessment per Transplant Surgery      # Kidney Transplant Evaluation: Patient is a good candidate overall. Benefits of a living donor transplant were discussed.    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   AVG access no issues   Makes less than 1 cup of urine   Blood type A  cPRA 0   Dialysis vintage 2 years and 6 months      # Cardiac Risk:  >50; pre-diabetic with A1c 5.8   No  angina, dyspnea with dialysis   Limited exertion wise d/t knee issues     Minimal CAD on coronary angiogram 2018   Dr. Schaffer recommending nuc med stress test    CORONARY ANGIOGRAM:   1. Both coronary arteries arise from their respective cusps.  2. Right dominant.  3. LM is without angiographic evidence of disease.   4. LAD is a type 3 vessel and gives rise to septal perforators, large bifurcating D1.  The pLAD has a 0% stenosis, mLAD has a 0% stenosis there is a myocardial bridge in the mid LAD, dLAD has a 0% stenosis, D1 has a 0% stenosis.  5. LCX gives rise to OM1 and OM2 vessels.  The pLCx has a 0% stenosis, mLCx has a 0% stenosis, dLCx has a 0% stenosis, OM1 has a 0% stenosis and OM2 has a 0% stenosis.    6. RCA gives rise to PL branches and supplies PDA. The pRCA has a 0% stenosis, mRCA has a 0% stenosis, dRCA has a 0% stenosis, RPDA has a <25% ostial stenosis and RPLA has a 0% stenosis.       TTE 1/2021   EF 60-65%; LV normal   RV normal; high estimated RA pressure estimated at 24.6mmHg  No pericardial effusion     #EBV viremia   -Followed by ID who is checking every 6 months   -EBV IgG positive     #Vascular assessment   -Given age >50, imaging per Transplant Surgery     #Potential living donors  - and 3 daughters     #Frailty   -Does not sound frail, though limited with knee pain   -Would benefit from in person evaluation     # Health Maintenance:   Sees MERRICK Hunt   Mammogram last year   Pap smear 2018  Colonoscopy 9 years ago     Discussed the risks and benefits of a transplant, including the risk of surgery and immunosuppression medications.     Patient's overall evaluation will be discussed in the Transplant Program's regular meeting with a final recommendation on the patients suitability for transplant to be made at that time.    George Sinclair MD  Transplant Nephrology   Pager: [phone]    Evaluation:  Kecia Blue was seen in consultation at the request of Dr. Jay Leon  for evaluation as a potential kidney transplant recipient.    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    History of Present Illness:  Overall Kecia is feeling quite well. She dialyzes in [employer] under the care of Dr. Glasgow which she is tolerating reasonably well. Historically she had issues with high fluid gains, but appears better compensated at this time. Her access is an AVG. Though she has had infectious issues and GI intolerance to mycophenolate (no endoscopy, biopsy proven disease), she is tolerating immunosuppression relatively well. She has not had any skin cancers/cancer issues. She serially requires bronchial dilatation which aside from post-procedure cough she has tolerated well without developing pneumonia thereafter. She has had pulmonary aspergillosis and PJP. She follows with ID who has cleared her for kidney transplant.  Though she has had a history of liver issues including fibrosis on biopsy, this has recently been well compensated. She has not required recent paracentesis, had ascites or synthetic hepatic dysfunction. She last saw Dr. Denise in 6/2021.          Kidney Disease Hx:        Kidney Disease Dx: CNI toxicity       Biopsy Proven: No         On Dialysis: Yes, dialysis vintage 2 years and 6 months         Primary Nephrologist: Dr. Glasgow       H/o Kidney Stones: No       H/o Recurrent/Frequent UTI: No         Diabetic Hx: None ; pre-diabetic with Hgb A1c 5.8         Cardiac/Vascular Disease Risk Factors:        Cardiac Risk Factors: pre-diabetes, ESKD       Known CAD: No       Known PAD/Caludication Symptoms: No       Known Heart Failure: No       Arrhythmia: No       Pulmonary Hypertension: No       Valvular Disease: No       Other: None         Viral Serology Status       CMV IgG Antibody: Positive       EBV IgG Antibody: Positive; EBV viremia          Volume Status/Weight:        Volume  status: Limited assessment in virtual visit; appears clinically euvolemic       Weight:  Acceptable BMI     Wt Readings from Last 4 Encounters:   03/08/22 57.2 kg (126 lb)   02/16/22 55.8 kg (123 lb)   02/11/22 56.2 kg (123 lb 14.4 oz)   10/12/21 54.8 kg (120 lb 11.5 oz)            BMI: There is no height or weight on file to calculate BMI.         Functional Capacity/Frailty:        Limited by knee pain. Would need in-person visit     Fatigue/Decreased Energy: [x] No [] Yes    Chest Pain or SOB with Exertion: [x] No [] Yes    Significant Weight Change: [x] No [] Yes    Nausea, Vomiting or Diarrhea: [x] No [] Yes    Fever, Sweats or Chills:  [x] No [] Yes    Leg Swelling [x] No [] Yes        History of Cancer: None      # COVID Vaccination Up To Date: Yes    Potential Living Kidney Donors: Yes   Family wants to be tested.    and three daughters.     Review of Systems:  A comprehensive review of systems was obtained and negative, except as noted in the HPI or PMH.    Past Medical History:   Medical record was reviewed and PMH was discussed with patient and noted below.  Past Medical History:   Diagnosis Date     Acute on chronic respiratory failure with hypoxia (H) 02/21/2018     Anisocoria      Antisynthetase syndrome (H) 2014     Anxiety      Aspergillus (H)      Aspergillus pneumonia (H) 11/20/2020     Benign essential hypertension      C. difficile colitis      Cytomegalovirus (CMV) viremia (H)      Dermatomyositis (H)      Dysplasia of cervix, low grade (ESTRADA 1)      EBV (Franklin-Barr virus) viremia      ESRD (end stage renal disease) on dialysis (H)      ILD (interstitial lung disease) (H)      Lung replaced by transplant (H)      Osteopenia      Paroxysmal atrial fibrillation (H)      Pneumocystis jiroveci pneumonia (H)      PONV (postoperative nausea and vomiting)      Post-menopause      Pulmonary hypertension (H)      Raynaud's disease      Seronegative rheumatoid arthritis (H)        Past Social  History:   Past Surgical History:   Procedure Laterality Date     BRONCHOSCOPY (RIGID OR FLEXIBLE), DIAGNOSTIC N/A 4/10/2018    Procedure: COMBINED BRONCHOSCOPY (RIGID OR FLEXIBLE), LAVAGE;;  Surgeon: Mariposa Donohue MD;  [facility]     BRONCHOSCOPY (RIGID OR FLEXIBLE), DIAGNOSTIC N/A 12/23/2020    Procedure: BRONCHOSCOPY, WITH BRONCHOALVEOLAR LAVAGE;  Surgeon: Uri Bass MD;  [facility]: [facility]     BRONCHOSCOPY (RIGID OR FLEXIBLE), DILATE BRONCHUS / TRACHEA N/A 10/11/2018    Procedure: BRONCHOSCOPY (RIGID OR FLEXIBLE), DILATE BRONCHUS / TRACHEA;  Flexible And Rigid Bronchoscopy and Dilation;  Surgeon: Wilber Lin MD;  [facility]     BRONCHOSCOPY FLEXIBLE N/A 3/13/2018    Procedure: BRONCHOSCOPY FLEXIBLE;  Flexible Bronchoscopy ;  Surgeon: Gissell Sanchez MD;  [facility]     BRONCHOSCOPY FLEXIBLE N/A 5/9/2018    Procedure: BRONCHOSCOPY FLEXIBLE;;  Surgeon: Wilber Lin MD;  [facility]     BRONCHOSCOPY FLEXIBLE AND RIGID N/A 9/10/2018    Procedure: BRONCHOSCOPY FLEXIBLE AND RIGID;  Flexible and Rigid Bronchoscopy with Balloon Dilation, tissue debulking with cryo, and Right mainstem bronchus stent placement;  Surgeon: Wilber Lin MD;  [facility]     BRONCHOSCOPY RIGID N/A 6/6/2018    Procedure: BRONCHOSCOPY RIGID;;  Surgeon: Lopez Macias MD;  [facility]     BRONCHOSCOPY, DILATE BRONCHUS, STENT BRONCHUS, COMBINED N/A 6/11/2018    Procedure: COMBINED BRONCHOSCOPY, DILATE BRONCHUS, STENT BRONCHUS;  Flexible Bronchoscopy, Balloon Dilation, Bronchial Washings;  Surgeon: Wilber Lin MD;  [facility]     BRONCHOSCOPY, DILATE BRONCHUS, STENT BRONCHUS, COMBINED Right 7/10/2018    Procedure: COMBINED BRONCHOSCOPY, DILATE BRONCHUS, STENT BRONCHUS;  Flexible Bronchoscopy, Balloon Dilation, Bronchial Washings  ;  Surgeon: Wilber Lin MD;  [facility]     BRONCHOSCOPY, DILATE BRONCHUS, STENT BRONCHUS, COMBINED N/A  8/2/2018    Procedure: COMBINED BRONCHOSCOPY, DILATE BRONCHUS, STENT BRONCHUS;  Flexible Bronchoscopy, Bronchial Washings, Balloon Dilation;  Surgeon: Wilber Lin MD;  [facility]     BRONCHOSCOPY, DILATE BRONCHUS, STENT BRONCHUS, COMBINED N/A 8/20/2018    Procedure: COMBINED BRONCHOSCOPY, DILATE BRONCHUS, STENT BRONCHUS;  Flexible Bronchoscopy, Balloon Dilation;  Surgeon: Wilber Lin MD;  [facility]     BRONCHOSCOPY, DILATE BRONCHUS, STENT BRONCHUS, COMBINED N/A 10/29/2018    Procedure: Flexible Bronchoscopy, Balloon Dilation, Stent Revision, Airway Examination And Therapeutic Suctioning, Cyro Tumor Debulking;  Surgeon: Wilber Lin MD;  [facility]     BRONCHOSCOPY, DILATE BRONCHUS, STENT BRONCHUS, COMBINED N/A 11/20/2018    Procedure: Rigid Bronchoscopy, Stent Removal and dilitation;  Surgeon: Wilber Lin MD;  [facility]     BRONCHOSCOPY, DILATE BRONCHUS, STENT BRONCHUS, COMBINED N/A 12/14/2018    Procedure: Flexible And Rigid Bronchoscopy, Balloon Dilation, Bronchial Washing;  Surgeon: Wilber Lin MD;  [facility]     BRONCHOSCOPY, DILATE BRONCHUS, STENT BRONCHUS, COMBINED N/A 1/17/2019    Procedure: Flexible And Rigid Bronchoscopy, Balloon Dilation.;  Surgeon: Wilber Lin MD;  [facility]     BRONCHOSCOPY, DILATE BRONCHUS, STENT BRONCHUS, COMBINED N/A 3/7/2019    Procedure: Flexible and Rigid Bronchoscopy, Bronchial Washing, Balloon Dilation;  Surgeon: Wilber Lin MD;  [facility]     BRONCHOSCOPY, DILATE BRONCHUS, STENT BRONCHUS, COMBINED N/A 6/6/2019    Procedure: Rigid and Flexible Bronchoscopy, Balloon Dilation;  Surgeon: Wilber Lin MD;  [facility]     BRONCHOSCOPY, DILATE BRONCHUS, STENT BRONCHUS, COMBINED N/A 10/11/2019    Procedure: Flexible and Rigid Bronchoscopy, Balloon Dilation, Bronchoalveolar Lagave;  Surgeon: Wilber Lin MD;  [facility]     BRONCHOSCOPY, DILATE  BRONCHUS, STENT BRONCHUS, COMBINED N/A 2/19/2021    Procedure: BRONCHOSCOPY, flexible, airway dilation, and bronchial wash;  Surgeon: Wilber Lin MD;  [facility]     BRONCHOSCOPY, DILATE BRONCHUS, STENT BRONCHUS, COMBINED N/A 4/9/2021    Procedure: BRONCHOSCOPY, flexible and rigid, Airway suctioning;  Surgeon: Mati Norris MD;  [facility]     CV RIGHT HEART CATH MEASUREMENTS RECORDED N/A 3/10/2020    Procedure: CV RIGHT HEART CATH;  Surgeon: Wai Garcia MD;  [facility]     ENT SURGERY      tonsillectomy as a child     ESOPHAGOSCOPY, GASTROSCOPY, DUODENOSCOPY (EGD), COMBINED N/A 10/29/2018    Procedure: COMBINED ESOPHAGOSCOPY, GASTROSCOPY, DUODENOSCOPY (EGD) with biopsies and polypectomy;  Surgeon: Chente Bloom MD;  [facility]     INSERT EXTRACORPORAL MEMBRANE OXYGENATOR ADULT (OUTSIDE OR) N/A 2/27/2018    Procedure: INSERT EXTRACORPORAL MEMBRANE OXYGENATOR ADULT (OUTSIDE OR);  INSERT EXTRACORPORAL MEMBRANE OXYGENATOR ADULT (OUTSIDE OR) ;  Surgeon: Toby Hernandez MD;  [facility]     IR CVC TUNNEL PLACEMENT > 5 YRS OF AGE  10/25/2019     IR DIALYSIS FISTULOGRAM LEFT  3/2/2021     IR DIALYSIS MECH THROMB, PTA  3/2/2021     IR FLUORO 0-1 HOUR  5/7/2021     IR GASTRO JEJUNOSTOMY TUBE PLACEMENT  2/16/2021     IR PARACENTESIS  1/8/2020     IR THORACENTESIS  9/13/2019     IR TRANSCATHETER BIOPSY  1/8/2020     LASER CO2 BRONCHOSCOPY N/A 4/30/2021    Procedure: Flexible and Rigid Bronchoscopy and Tissue Debulking with CO2 Laser Assistance;  Surgeon: Mati Norris MD;  [facility]     LASER CO2 BRONCHOSCOPY N/A 6/11/2021    Procedure: BRONCHOSCOPY, Flexible and Rigid Bronchoscopy, Tissue Debulking with cryo Assistance, airway dilation,;  Surgeon: Mati Norris MD;  [facility]     LASER CO2 BRONCHOSCOPY N/A 9/16/2021    Procedure: BRONCHOSCOPY, flexible and rigid, CO2 Laser Debulking;  Surgeon: Mati Norris MD;   [facility]     LASER CO2 BRONCHOSCOPY N/A 2022    Procedure: flexible, rigid bronchoscopy, tissue debulking, airway dilation, co2 laser, bronchoalveolar lavage;  Surgeon: Juana Baugh MD;  [facility]     no prior surgery       REMOVE EXTRACORPORAL MEMBRANE OXYGENATOR ADULT N/A 3/3/2018    Procedure: REMOVE EXTRACORPORAL MEMBRANE OXYGENATOR ADULT;  Removal of Right Femoral Venous and Right Axillary Arterial Extracorporeal Membrane Oxygenator;  Surgeon: Toby Hernandez MD;  [facility]     TRANSPLANT LUNG RECIPIENT SINGLE X2 Bilateral 3/1/2018    Procedure: TRANSPLANT LUNG RECIPIENT SINGLE X2;  Median Sternotomy, Extracorporeal Membrane Oxygenator, bilateral sequential lung transplant;  Surgeon: Toby Hernandez MD;  [facility]     Personal history of bleeding or anesthesia problems: No    Family History:  Family History   Problem Relation Age of Onset     Hypertension Mother      Arthritis Mother      Pancreatic Cancer Father      Diabetes Father        Personal History:   Social History     Socioeconomic History     Marital status:      Spouse name: Not on file     Number of children: Not on file     Years of education: Not on file     Highest education level: Not on file   Occupational History     Not on file   Tobacco Use     Smoking status: Never Smoker     Smokeless tobacco: Never Used   Substance and Sexual Activity     Alcohol use: No     Alcohol/week: 1.0 standard drink     Types: 1 Glasses of wine per week     Drug use: No     Sexual activity: Not on file   Other Topics Concern     Parent/sibling w/ CABG, MI or angioplasty before 65F 55M? No   Social History Narrative    3/6/2019 - Lives with . Has three daughters. Four grandchildren (two ). No pets. Travelled previously to [facility]. Has visited [place] several times.      Social Determinants of Health     Financial Resource Strain: Not on file   Food Insecurity: Not on file    Transportation Needs: Not on file   Physical Activity: Not on file   Stress: Not on file   Social Connections: Not on file   Intimate Partner Violence: Not on file   Housing Stability: Not on file       Allergies:  No Known Allergies    Medications:  Current Outpatient Medications   Medication Sig     acetaminophen (TYLENOL) 325 MG tablet Take 1 tablet (325 mg) by mouth every 4 hours as needed for mild pain or fever     albuterol (PROVENTIL) (2.5 MG/3ML) 0.083% neb solution Take 1 vial (2.5 mg) by nebulization daily     azaTHIOprine (IMURAN) 50 MG tablet Take 0.5 tablets (25 mg) by mouth daily     budesonide (PULMICORT) 0.5 MG/2ML neb solution Take 2 mLs (0.5 mg) by nebulization daily     calcium acetate (PHOSLO) 667 MG CAPS capsule Take 667 mg by mouth daily with food (largest meal)     Magnesium Cl-Calcium Carbonate (SLOW-MAG) 71.5-119 MG TBEC Take 3 tablets by mouth four times a week Take on non dialysis days T/Th/Sa/Su     metoprolol tartrate (LOPRESSOR) 25 MG tablet 1 tablet (25 mg) by Oral or Feeding Tube route 2 times daily     multivitamin RENAL (RENAVITE RX/NEPHROVITE) 1 MG tablet Take 1 tablet by mouth daily     pantoprazole (PROTONIX) 40 MG EC tablet Take 1 tablet (40 mg) by mouth every morning (before breakfast)     posaconazole (NOXAFIL) 100 MG EC tablet Take 3 tablets (300 mg) by mouth daily     predniSONE (DELTASONE) 2.5 MG tablet Take 1 tablet (2.5 mg) by mouth every evening     predniSONE (DELTASONE) 5 MG tablet Take 1 tablet (5 mg) by mouth every morning     sulfamethoxazole-trimethoprim (BACTRIM) 400-80 MG tablet Take 1 tablet by mouth Every Mon, Wed, Fri Morning     tacrolimus (GENERIC EQUIVALENT) 0.5 MG capsule Take 2 capsules (1 mg) by mouth 2 times daily     No current facility-administered medications for this visit.       Vitals:  LMP 06/07/2014 (Exact Date)     Exam:  GENERAL APPEARANCE: alert and no distress  HENT: no obvious abnormalities on appearance  RESP: breathing appears  unremarkable with normal rate, no audible wheezing or cough and no apparent shortness of breath with conversation  MS: extremities normal - no gross deformities noted  SKIN: no apparent rash and normal skin tone  NEURO: speech is clear with no obvious neurological deficits  PSYCH: mentation appears normal and affect normal      Results:   Recent Results (from the past 336 hour(s))   CBC with platelets differential    Collection Time: 03/28/22  9:54 AM   Result Value Ref Range    WBC Count (External) 7.7 4.5 - 11.0 10(3)/uL    RBC Count (External) 3.21 (L) 4.00 - 5.20 10(6)/uL    Hemoglobin (External) 10.2 (L) 12.0 - 16.0 g/dL    Hematocrit (External) 32.3 (L) 33.0 - 51.0 %    MCV (External) 100.6 (H) 80.0 - 100.0 fL    MCH (External) 31.8 26.0 - 34.0 pg    MCHC (External) 31.6 (L) 32.0 - 36.0 %    RDW (External) 15.3 (H) 11.5 - 13.1 %    Platelet Count (External) 246 150 - 450 10(3)/uL    % Neutrophils (External) 78.0 (H) 35.0 - 77.0 %    % Lymphocytes (External) 11.7 (L) 24.0 - 44.0 %    % Monocytes (External) 7.7 7.0 - 13.0 %    % Eosinophils (External) 1.0 0.0 - 3.0 %    % Basophils (External) 0.3 0.0 - 1.0 %    % Immature Granulocytes (External) 1.3 %    Absolute Neutrophils (External) 6.0 1.5 - 8.5 10(3)/uL    Absolute Lymphocytes (External) 0.9 (L) 1.1 - 5.0 10(3)/uL    Absolute Monocytes (External) 0.6 0.1 - 0.7 10(3)/uL    Absolute Eosinophils (External) 0.1 0.0 - 0.3 10(3)/uL    Absolute Basophils (External) 0.0 0.0 - 0.1 10(3)/uL    Absolute Immature Granulocytes (External) 0.10 10(3)/uL   Basic metabolic panel    Collection Time: 03/28/22  9:54 AM   Result Value Ref Range    Sodium (External) 133 (L) 136 - 145 mmol/L    Potassium (External) 4.6 3.5 - 5.1 mmol/L    Chloride (External) (External) 97 (L) 98 - 107 mmol/L    CO2 (External) 24 20 - 31 mmol/L    Anion Gap (External) 16.6 mmol/L    Creatinine (External) 7.20 (HH) 0.55 - 1.02 mg/dL    Urea Nitrogen (External) 80.0 (HH) 7.0 - 26.0 mg/dL     BUN/Creatinine Ratio (External) 11.11 ratio    Calcium (External) 8.9 8.4 - 10.2 mg/dL    Glucose (External) 130 (H) 70 - 105 mg/dL    GFR Estimated (External) 6 (L) >=60 ml/min/1.73m2   Hepatic function panel    Collection Time: 03/28/22  9:54 AM   Result Value Ref Range    Protein Total (External) 7.3 6.4 - 8.3 g/dL    Albumin (External) 3.2 (L) 3.5 - 5.0 g/dL    AST (External) 30 5 - 34 U/L    ALT (External) 65 (H) 0 - 55 U/L    Alk Phosphatase (External) 278 (HH) 40 - 150 U/L    Bilirubin Total (External) 0.8 0.2 - 1.2 mg/dL   Magnesium    Collection Time: 03/28/22  9:54 AM   Result Value Ref Range    Magnesium (External) 1.6 1.6 - 2.6 mg/dL   Phosphorus    Collection Time: 03/28/22  9:54 AM   Result Value Ref Range    Phosphorus (External) 5.0 (H) 2.3 - 4.7 mg/dL   CMV Quantitative, PCR    Collection Time: 03/28/22  9:54 AM   Result Value Ref Range    CMV DNA Quant (External) 101 (A) Undetected IU/mL    Log IU/ML of CMVQNT (External) 2.00 log IU/mL   Tacrolimus level    Collection Time: 03/28/22 10:00 AM   Result Value Ref Range    Tacrolimus by Tandem Mass Spectrometry 6.9 5.0 - 15.0 ug/L    Tacrolimus Last Dose Date 3/27/2022     Tacrolimus Last Dose Time 10:30 PM

## 2022-03-30 NOTE — TELEPHONE ENCOUNTER
Spoke with patient and she confirmed the tacrolimus dose change to 1 mg in AM and 1.5 mg in PM. Recheck on 4/8 when she has knee replacement surgery.

## 2022-03-31 ENCOUNTER — VIRTUAL VISIT (OUTPATIENT)
Dept: NEPHROLOGY | Facility: CLINIC | Age: 62
End: 2022-03-31
Attending: INTERNAL MEDICINE
Payer: MEDICARE

## 2022-03-31 DIAGNOSIS — B44.9 ASPERGILLUS PNEUMONIA (H): ICD-10-CM

## 2022-03-31 DIAGNOSIS — R73.03 PRE-DIABETES: ICD-10-CM

## 2022-03-31 DIAGNOSIS — Z86.19 HISTORY OF PNEUMOCYSTIS JIROVECII PNEUMONIA: ICD-10-CM

## 2022-03-31 DIAGNOSIS — B27.00 EBV (EPSTEIN-BARR VIRUS) VIREMIA: ICD-10-CM

## 2022-03-31 DIAGNOSIS — Z01.818 PRE-TRANSPLANT EVALUATION FOR CKD (CHRONIC KIDNEY DISEASE): Primary | ICD-10-CM

## 2022-03-31 DIAGNOSIS — Z94.2 S/P LUNG TRANSPLANT (H): ICD-10-CM

## 2022-03-31 DIAGNOSIS — Z99.2 HEMODIALYSIS PATIENT (H): ICD-10-CM

## 2022-03-31 DIAGNOSIS — J98.09 BRONCHIAL STENOSIS, RIGHT: ICD-10-CM

## 2022-03-31 PROCEDURE — 99215 OFFICE O/P EST HI 40 MIN: CPT | Mod: 95 | Performed by: INTERNAL MEDICINE

## 2022-03-31 PROCEDURE — G0463 HOSPITAL OUTPT CLINIC VISIT: HCPCS | Mod: PN,RTG | Performed by: INTERNAL MEDICINE

## 2022-03-31 ASSESSMENT — ENCOUNTER SYMPTOMS: NEW SYMPTOMS OF CORONARY ARTERY DISEASE: 0

## 2022-03-31 NOTE — PROGRESS NOTES
Kecia is a 61 year old who is being evaluated via a billable video visit.      How would you like to obtain your AVS? MyChart  If the video visit is dropped, the invitation should be resent by: Send to e-mail at: [email or web address]  Will anyone else be joining your video visit? No      Video Start Time: 1302  Video-Visit Details    Type of service:  Video Visit    Video End Time:1342    Originating Location (pt. Location): Home    Distant Location (provider location):  [facility] [place]     Platform used for Video Visit: [employer]

## 2022-03-31 NOTE — LETTER
3/31/2022       RE: Kecia Blue  [id number] [place] [address]     Dear Colleague,    Thank you for referring your patient, Kecia Blue, to the [facility] [place] at [facility]. Please see a copy of my visit note below.    TRANSPLANT NEPHROLOGY RECIPIENT EVALUATION NOTE    Assessment and Plan:  Kecia appears to be a good candidate for kidney transplantation. Post-lung transplantation, she has tolerated immunosuppression reasonably well, aside from mycophenolate, and would benefit from kidney transplantation. An outlying question is that of her portal hypertension and liver disease. Though limited virtually, based on her report, lab evaluation, her liver function appears well compensated at this time. She would likely benefit from hepatology clearance/re-evaulation of her portal hypertension/congestive hepatopathy prior to kidney transplantation. She is vaccinated against COVID19. 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 She would benefit from living donor evaluations and pursuing this avenue if possible so as to minimize immunosuppression needs. Infectious disease follows her closely and cleared for kidney transplant.     Recommendations:   -Workup of living donors, pursuing living donation to minimize immunosuppression needs given prior infectious history     -In person visit to assess frailty    -Hepatology clearance/reevaulation of her portal hypertension    -Pharmacologic nuclear med stress test for cardiac evaluation     -Cleared from ID perspective by Dr. Espinoza in visit 2/1/2022. Given PJP history would likely benefit from lifelong PJP prophylaxis.     -Update COVID vaccination per CDC guidelines prior to transplant/Evusheld as indicated    -Vascular assessment per Transplant Surgery      # Kidney Transplant Evaluation: Patient is a good  candidate overall. Benefits of a living donor transplant were discussed.    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   AVG access no issues   Makes less than 1 cup of urine   Blood type A  cPRA 0   Dialysis vintage 2 years and 6 months      # Cardiac Risk:  >50; pre-diabetic with A1c 5.8   No angina, dyspnea with dialysis   Limited exertion wise d/t knee issues     Minimal CAD on coronary angiogram 2018   Dr. Schaffer recommending nuc med stress test    CORONARY ANGIOGRAM:   1. Both coronary arteries arise from their respective cusps.  2. Right dominant.  3. LM is without angiographic evidence of disease.   4. LAD is a type 3 vessel and gives rise to septal perforators, large bifurcating D1.  The pLAD has a 0% stenosis, mLAD has a 0% stenosis there is a myocardial bridge in the mid LAD, dLAD has a 0% stenosis, D1 has a 0% stenosis.  5. LCX gives rise to OM1 and OM2 vessels.  The pLCx has a 0% stenosis, mLCx has a 0% stenosis, dLCx has a 0% stenosis, OM1 has a 0% stenosis and OM2 has a 0% stenosis.    6. RCA gives rise to PL branches and supplies PDA. The pRCA has a 0% stenosis, mRCA has a 0% stenosis, dRCA has a 0% stenosis, RPDA has a <25% ostial stenosis and RPLA has a 0% stenosis.       TTE 1/2021   EF 60-65%; LV normal   RV normal; high estimated RA pressure estimated at 24.6mmHg  No pericardial effusion     #EBV viremia   -Followed by ID who is checking every 6 months   -EBV IgG positive     #Vascular assessment   -Given age >50, imaging per Transplant Surgery     #Potential living donors  - and 3 daughters     #Frailty   -Does not sound frail, though limited with knee pain   -Would benefit from in person evaluation     # Health Maintenance:   Sees MERRICK Hunt   Mammogram last year   Pap smear 2018  Colonoscopy 9 years ago     Discussed the risks and benefits of a transplant, including the risk of surgery and immunosuppression medications.     Patient's overall  evaluation will be discussed in the Transplant Program's regular meeting with a final recommendation on the patients suitability for transplant to be made at that time.    George Sinclair MD  Transplant Nephrology   Pager: [phone]    Evaluation:  Kecia Blue was seen in consultation at the request of Dr. Jay Leon for evaluation as a potential kidney transplant recipient.    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    History of Present Illness:  Overall Kecia is feeling quite well. She dialyzes in [place] under the care of Dr. Glasgow which she is tolerating reasonably well. Historically she had issues with high fluid gains, but appears better compensated at this time. Her access is an AVG. Though she has had infectious issues and GI intolerance to mycophenolate (no endoscopy, biopsy proven disease), she is tolerating immunosuppression relatively well. She has not had any skin cancers/cancer issues. She serially requires bronchial dilatation which aside from post-procedure cough she has tolerated well without developing pneumonia thereafter. She has had pulmonary aspergillosis and PJP. She follows with ID who has cleared her for kidney transplant.  Though she has had a history of liver issues including fibrosis on biopsy, this has recently been well compensated. She has not required recent paracentesis, had ascites or synthetic hepatic dysfunction. She last saw Dr. Densie in 6/2021.          Kidney Disease Hx:        Kidney Disease Dx: CNI toxicity       Biopsy Proven: No         On Dialysis: Yes, dialysis vintage 2 years and 6 months         Primary Nephrologist: Dr. Glasgow       H/o Kidney Stones: No       H/o Recurrent/Frequent UTI: No         Diabetic Hx: None ; pre-diabetic with Hgb A1c 5.8         Cardiac/Vascular Disease Risk Factors:        Cardiac Risk Factors: pre-diabetes, ESKD       Known  CAD: No       Known PAD/Caludication Symptoms: No       Known Heart Failure: No       Arrhythmia: No       Pulmonary Hypertension: No       Valvular Disease: No       Other: None         Viral Serology Status       CMV IgG Antibody: Positive       EBV IgG Antibody: Positive; EBV viremia          Volume Status/Weight:        Volume status: Limited assessment in virtual visit; appears clinically euvolemic       Weight:  Acceptable BMI     Wt Readings from Last 4 Encounters:   03/08/22 57.2 kg (126 lb)   02/16/22 55.8 kg (123 lb)   02/11/22 56.2 kg (123 lb 14.4 oz)   10/12/21 54.8 kg (120 lb 11.5 oz)            BMI: There is no height or weight on file to calculate BMI.         Functional Capacity/Frailty:        Limited by knee pain. Would need in-person visit     Fatigue/Decreased Energy: [x] No [] Yes    Chest Pain or SOB with Exertion: [x] No [] Yes    Significant Weight Change: [x] No [] Yes    Nausea, Vomiting or Diarrhea: [x] No [] Yes    Fever, Sweats or Chills:  [x] No [] Yes    Leg Swelling [x] No [] Yes        History of Cancer: None      # COVID Vaccination Up To Date: Yes    Potential Living Kidney Donors: Yes   Family wants to be tested.    and three daughters.     Review of Systems:  A comprehensive review of systems was obtained and negative, except as noted in the HPI or PMH.    Past Medical History:   Medical record was reviewed and PMH was discussed with patient and noted below.  Past Medical History:   Diagnosis Date     Acute on chronic respiratory failure with hypoxia (H) 02/21/2018     Anisocoria      Antisynthetase syndrome (H) 2014     Anxiety      Aspergillus (H)      Aspergillus pneumonia (H) 11/20/2020     Benign essential hypertension      C. difficile colitis      Cytomegalovirus (CMV) viremia (H)      Dermatomyositis (H)      Dysplasia of cervix, low grade (ESTRADA 1)      EBV (Franklin-Barr virus) viremia      ESRD (end stage renal disease) on dialysis (H)      ILD (interstitial lung  disease) (H)      Lung replaced by transplant (H)      Osteopenia      Paroxysmal atrial fibrillation (H)      Pneumocystis jiroveci pneumonia (H)      PONV (postoperative nausea and vomiting)      Post-menopause      Pulmonary hypertension (H)      Raynaud's disease      Seronegative rheumatoid arthritis (H)        Past Social History:   Past Surgical History:   Procedure Laterality Date     BRONCHOSCOPY (RIGID OR FLEXIBLE), DIAGNOSTIC N/A 4/10/2018    Procedure: COMBINED BRONCHOSCOPY (RIGID OR FLEXIBLE), LAVAGE;;  Surgeon: Mariposa Donohue MD;  Location:  GI     BRONCHOSCOPY (RIGID OR FLEXIBLE), DIAGNOSTIC N/A 12/23/2020    Procedure: BRONCHOSCOPY, WITH BRONCHOALVEOLAR LAVAGE;  Surgeon: Uri Bass MD;  Location:  GI     BRONCHOSCOPY (RIGID OR FLEXIBLE), DILATE BRONCHUS / TRACHEA N/A 10/11/2018    Procedure: BRONCHOSCOPY (RIGID OR FLEXIBLE), DILATE BRONCHUS / TRACHEA;  Flexible And Rigid Bronchoscopy and Dilation;  Surgeon: Wilber Lin MD;  Location: [facility] OR     BRONCHOSCOPY FLEXIBLE N/A 3/13/2018    Procedure: BRONCHOSCOPY FLEXIBLE;  Flexible Bronchoscopy ;  Surgeon: Gissell Sanchez MD;  [facility]     BRONCHOSCOPY FLEXIBLE N/A 5/9/2018    Procedure: BRONCHOSCOPY FLEXIBLE;;  Surgeon: Wilber Lin MD;  Location: [facility] GI     BRONCHOSCOPY FLEXIBLE AND RIGID N/A 9/10/2018    Procedure: BRONCHOSCOPY FLEXIBLE AND RIGID;  Flexible and Rigid Bronchoscopy with Balloon Dilation, tissue debulking with cryo, and Right mainstem bronchus stent placement;  Surgeon: Wilber Lin MD;  [facility]     BRONCHOSCOPY RIGID N/A 6/6/2018    Procedure: BRONCHOSCOPY RIGID;;  Surgeon: Lopez Macias MD;  Location:  GI     BRONCHOSCOPY, DILATE BRONCHUS, STENT BRONCHUS, COMBINED N/A 6/11/2018    Procedure: COMBINED BRONCHOSCOPY, DILATE BRONCHUS, STENT BRONCHUS;  Flexible Bronchoscopy, Balloon Dilation, Bronchial Washings;  Surgeon: Wilber Lin MD;  Location:  OR      BRONCHOSCOPY, DILATE BRONCHUS, STENT BRONCHUS, COMBINED Right 7/10/2018    Procedure: COMBINED BRONCHOSCOPY, DILATE BRONCHUS, STENT BRONCHUS;  Flexible Bronchoscopy, Balloon Dilation, Bronchial Washings  ;  Surgeon: Wilber Lin MD;  [facility]     BRONCHOSCOPY, DILATE BRONCHUS, STENT BRONCHUS, COMBINED N/A 8/2/2018    Procedure: COMBINED BRONCHOSCOPY, DILATE BRONCHUS, STENT BRONCHUS;  Flexible Bronchoscopy, Bronchial Washings, Balloon Dilation;  Surgeon: Wilber Lin MD;  [facility]     BRONCHOSCOPY, DILATE BRONCHUS, STENT BRONCHUS, COMBINED N/A 8/20/2018    Procedure: COMBINED BRONCHOSCOPY, DILATE BRONCHUS, STENT BRONCHUS;  Flexible Bronchoscopy, Balloon Dilation;  Surgeon: Wilber Lin MD;  [facility]     BRONCHOSCOPY, DILATE BRONCHUS, STENT BRONCHUS, COMBINED N/A 10/29/2018    Procedure: Flexible Bronchoscopy, Balloon Dilation, Stent Revision, Airway Examination And Therapeutic Suctioning, Cyro Tumor Debulking;  Surgeon: Wilber Lin MD;  [facility]     BRONCHOSCOPY, DILATE BRONCHUS, STENT BRONCHUS, COMBINED N/A 11/20/2018    Procedure: Rigid Bronchoscopy, Stent Removal and dilitation;  Surgeon: Wilber Lin MD;  [facility]     BRONCHOSCOPY, DILATE BRONCHUS, STENT BRONCHUS, COMBINED N/A 12/14/2018    Procedure: Flexible And Rigid Bronchoscopy, Balloon Dilation, Bronchial Washing;  Surgeon: Wilber Lin MD;  [facility]     BRONCHOSCOPY, DILATE BRONCHUS, STENT BRONCHUS, COMBINED N/A 1/17/2019    Procedure: Flexible And Rigid Bronchoscopy, Balloon Dilation.;  Surgeon: Wilber Lin MD;  [facility]     BRONCHOSCOPY, DILATE BRONCHUS, STENT BRONCHUS, COMBINED N/A 3/7/2019    Procedure: Flexible and Rigid Bronchoscopy, Bronchial Washing, Balloon Dilation;  Surgeon: Wilber Lin MD;  [facility]     BRONCHOSCOPY, DILATE BRONCHUS, STENT BRONCHUS, COMBINED N/A 6/6/2019    Procedure: Rigid and Flexible  Bronchoscopy, Balloon Dilation;  Surgeon: Wilber Lin MD;  [facility]     BRONCHOSCOPY, DILATE BRONCHUS, STENT BRONCHUS, COMBINED N/A 10/11/2019    Procedure: Flexible and Rigid Bronchoscopy, Balloon Dilation, Bronchoalveolar Lagave;  Surgeon: Wilber Lin MD;  [facility]     BRONCHOSCOPY, DILATE BRONCHUS, STENT BRONCHUS, COMBINED N/A 2/19/2021    Procedure: BRONCHOSCOPY, flexible, airway dilation, and bronchial wash;  Surgeon: Wilber Lin MD;  [facility]     BRONCHOSCOPY, DILATE BRONCHUS, STENT BRONCHUS, COMBINED N/A 4/9/2021    Procedure: BRONCHOSCOPY, flexible and rigid, Airway suctioning;  Surgeon: Mati Norris MD;  [facility]     CV RIGHT HEART CATH MEASUREMENTS RECORDED N/A 3/10/2020    Procedure: CV RIGHT HEART CATH;  Surgeon: Wai Garcia MD;  [facility]     ENT SURGERY      tonsillectomy as a child     ESOPHAGOSCOPY, GASTROSCOPY, DUODENOSCOPY (EGD), COMBINED N/A 10/29/2018    Procedure: COMBINED ESOPHAGOSCOPY, GASTROSCOPY, DUODENOSCOPY (EGD) with biopsies and polypectomy;  Surgeon: Chente Bloom MD;  [facility]     INSERT EXTRACORPORAL MEMBRANE OXYGENATOR ADULT (OUTSIDE OR) N/A 2/27/2018    Procedure: INSERT EXTRACORPORAL MEMBRANE OXYGENATOR ADULT (OUTSIDE OR);  INSERT EXTRACORPORAL MEMBRANE OXYGENATOR ADULT (OUTSIDE OR) ;  Surgeon: Toby Hernandez MD;  [facility]     IR CVC TUNNEL PLACEMENT > 5 YRS OF AGE  10/25/2019     IR DIALYSIS FISTULOGRAM LEFT  3/2/2021     IR DIALYSIS MECH THROMB, PTA  3/2/2021     IR FLUORO 0-1 HOUR  5/7/2021     IR GASTRO JEJUNOSTOMY TUBE PLACEMENT  2/16/2021     IR PARACENTESIS  1/8/2020     IR THORACENTESIS  9/13/2019     IR TRANSCATHETER BIOPSY  1/8/2020     LASER CO2 BRONCHOSCOPY N/A 4/30/2021    Procedure: Flexible and Rigid Bronchoscopy and Tissue Debulking with CO2 Laser Assistance;  Surgeon: Mati Norris MD;  [facility]     LASER CO2  BRONCHOSCOPY N/A 6/11/2021    Procedure: BRONCHOSCOPY, Flexible and Rigid Bronchoscopy, Tissue Debulking with cryo Assistance, airway dilation,;  Surgeon: Mati Norris MD;  [facility]     LASER CO2 BRONCHOSCOPY N/A 9/16/2021    Procedure: BRONCHOSCOPY, flexible and rigid, CO2 Laser Debulking;  Surgeon: Mati Norris MD;  [facility]     LASER CO2 BRONCHOSCOPY N/A 2/11/2022    Procedure: flexible, rigid bronchoscopy, tissue debulking, airway dilation, co2 laser, bronchoalveolar lavage;  Surgeon: Juana Baugh MD;  [facility]     no prior surgery       REMOVE EXTRACORPORAL MEMBRANE OXYGENATOR ADULT N/A 3/3/2018    Procedure: REMOVE EXTRACORPORAL MEMBRANE OXYGENATOR ADULT;  Removal of Right Femoral Venous and Right Axillary Arterial Extracorporeal Membrane Oxygenator;  Surgeon: Toby Hernandez MD;  [facility]     TRANSPLANT LUNG RECIPIENT SINGLE X2 Bilateral 3/1/2018    Procedure: TRANSPLANT LUNG RECIPIENT SINGLE X2;  Median Sternotomy, Extracorporeal Membrane Oxygenator, bilateral sequential lung transplant;  Surgeon: Toby Hernandez MD;  [facility]     Personal history of bleeding or anesthesia problems: No    Family History:  Family History   Problem Relation Age of Onset     Hypertension Mother      Arthritis Mother      Pancreatic Cancer Father      Diabetes Father        Personal History:   Social History     Socioeconomic History     Marital status:      Spouse name: Not on file     Number of children: Not on file     Years of education: Not on file     Highest education level: Not on file   Occupational History     Not on file   Tobacco Use     Smoking status: Never Smoker     Smokeless tobacco: Never Used   Substance and Sexual Activity     Alcohol use: No     Alcohol/week: 1.0 standard drink     Types: 1 Glasses of wine per week     Drug use: No     Sexual activity: Not on file   Other Topics Concern     Parent/sibling w/ CABG, MI or angioplasty  before 65F 55M? No   Social History Narrative    3/6/2019 - Lives with . Has three daughters. Four grandchildren (two ). No pets. Travelled previously to [place], [place]. Has visited [place] several times.      Social Determinants of Health     Financial Resource Strain: Not on file   Food Insecurity: Not on file   Transportation Needs: Not on file   Physical Activity: Not on file   Stress: Not on file   Social Connections: Not on file   Intimate Partner Violence: Not on file   Housing Stability: Not on file       Allergies:  No Known Allergies    Medications:  Current Outpatient Medications   Medication Sig     acetaminophen (TYLENOL) 325 MG tablet Take 1 tablet (325 mg) by mouth every 4 hours as needed for mild pain or fever     albuterol (PROVENTIL) (2.5 MG/3ML) 0.083% neb solution Take 1 vial (2.5 mg) by nebulization daily     azaTHIOprine (IMURAN) 50 MG tablet Take 0.5 tablets (25 mg) by mouth daily     budesonide (PULMICORT) 0.5 MG/2ML neb solution Take 2 mLs (0.5 mg) by nebulization daily     calcium acetate (PHOSLO) 667 MG CAPS capsule Take 667 mg by mouth daily with food (largest meal)     Magnesium Cl-Calcium Carbonate (SLOW-MAG) 71.5-119 MG TBEC Take 3 tablets by mouth four times a week Take on non dialysis days //     metoprolol tartrate (LOPRESSOR) 25 MG tablet 1 tablet (25 mg) by Oral or Feeding Tube route 2 times daily     multivitamin RENAL (RENAVITE RX/NEPHROVITE) 1 MG tablet Take 1 tablet by mouth daily     pantoprazole (PROTONIX) 40 MG EC tablet Take 1 tablet (40 mg) by mouth every morning (before breakfast)     posaconazole (NOXAFIL) 100 MG EC tablet Take 3 tablets (300 mg) by mouth daily     predniSONE (DELTASONE) 2.5 MG tablet Take 1 tablet (2.5 mg) by mouth every evening     predniSONE (DELTASONE) 5 MG tablet Take 1 tablet (5 mg) by mouth every morning     sulfamethoxazole-trimethoprim (BACTRIM) 400-80 MG tablet Take 1 tablet by mouth Every Mon, Wed, Fri Morning      tacrolimus (GENERIC EQUIVALENT) 0.5 MG capsule Take 2 capsules (1 mg) by mouth 2 times daily     No current facility-administered medications for this visit.       Vitals:  LMP 06/07/2014 (Exact Date)     Exam:  GENERAL APPEARANCE: alert and no distress  HENT: no obvious abnormalities on appearance  RESP: breathing appears unremarkable with normal rate, no audible wheezing or cough and no apparent shortness of breath with conversation  MS: extremities normal - no gross deformities noted  SKIN: no apparent rash and normal skin tone  NEURO: speech is clear with no obvious neurological deficits  PSYCH: mentation appears normal and affect normal      Results:   Recent Results (from the past 336 hour(s))   CBC with platelets differential    Collection Time: 03/28/22  9:54 AM   Result Value Ref Range    WBC Count (External) 7.7 4.5 - 11.0 10(3)/uL    RBC Count (External) 3.21 (L) 4.00 - 5.20 10(6)/uL    Hemoglobin (External) 10.2 (L) 12.0 - 16.0 g/dL    Hematocrit (External) 32.3 (L) 33.0 - 51.0 %    MCV (External) 100.6 (H) 80.0 - 100.0 fL    MCH (External) 31.8 26.0 - 34.0 pg    MCHC (External) 31.6 (L) 32.0 - 36.0 %    RDW (External) 15.3 (H) 11.5 - 13.1 %    Platelet Count (External) 246 150 - 450 10(3)/uL    % Neutrophils (External) 78.0 (H) 35.0 - 77.0 %    % Lymphocytes (External) 11.7 (L) 24.0 - 44.0 %    % Monocytes (External) 7.7 7.0 - 13.0 %    % Eosinophils (External) 1.0 0.0 - 3.0 %    % Basophils (External) 0.3 0.0 - 1.0 %    % Immature Granulocytes (External) 1.3 %    Absolute Neutrophils (External) 6.0 1.5 - 8.5 10(3)/uL    Absolute Lymphocytes (External) 0.9 (L) 1.1 - 5.0 10(3)/uL    Absolute Monocytes (External) 0.6 0.1 - 0.7 10(3)/uL    Absolute Eosinophils (External) 0.1 0.0 - 0.3 10(3)/uL    Absolute Basophils (External) 0.0 0.0 - 0.1 10(3)/uL    Absolute Immature Granulocytes (External) 0.10 10(3)/uL   Basic metabolic panel    Collection Time: 03/28/22  9:54 AM   Result Value Ref Range    Sodium  (External) 133 (L) 136 - 145 mmol/L    Potassium (External) 4.6 3.5 - 5.1 mmol/L    Chloride (External) (External) 97 (L) 98 - 107 mmol/L    CO2 (External) 24 20 - 31 mmol/L    Anion Gap (External) 16.6 mmol/L    Creatinine (External) 7.20 (HH) 0.55 - 1.02 mg/dL    Urea Nitrogen (External) 80.0 (HH) 7.0 - 26.0 mg/dL    BUN/Creatinine Ratio (External) 11.11 ratio    Calcium (External) 8.9 8.4 - 10.2 mg/dL    Glucose (External) 130 (H) 70 - 105 mg/dL    GFR Estimated (External) 6 (L) >=60 ml/min/1.73m2   Hepatic function panel    Collection Time: 03/28/22  9:54 AM   Result Value Ref Range    Protein Total (External) 7.3 6.4 - 8.3 g/dL    Albumin (External) 3.2 (L) 3.5 - 5.0 g/dL    AST (External) 30 5 - 34 U/L    ALT (External) 65 (H) 0 - 55 U/L    Alk Phosphatase (External) 278 (HH) 40 - 150 U/L    Bilirubin Total (External) 0.8 0.2 - 1.2 mg/dL   Magnesium    Collection Time: 03/28/22  9:54 AM   Result Value Ref Range    Magnesium (External) 1.6 1.6 - 2.6 mg/dL   Phosphorus    Collection Time: 03/28/22  9:54 AM   Result Value Ref Range    Phosphorus (External) 5.0 (H) 2.3 - 4.7 mg/dL   CMV Quantitative, PCR    Collection Time: 03/28/22  9:54 AM   Result Value Ref Range    CMV DNA Quant (External) 101 (A) Undetected IU/mL    Log IU/ML of CMVQNT (External) 2.00 log IU/mL   Tacrolimus level    Collection Time: 03/28/22 10:00 AM   Result Value Ref Range    Tacrolimus by Tandem Mass Spectrometry 6.9 5.0 - 15.0 ug/L    Tacrolimus Last Dose Date 3/27/2022     Tacrolimus Last Dose Time 10:30 PM                  Kecia is a 61 year old who is being evaluated via a billable video visit.      How would you like to obtain your AVS? MyChart  If the video visit is dropped, the invitation should be resent by: Send to e-mail at: [email or web address]  Will anyone else be joining your video visit? No      Video Start Time: 1302  Video-Visit Details    Type of service:  Video Visit    Video End Time:1342    Originating Location (pt.  Location): Home    Distant Location (provider location):  [facility] [place]     Platform used for Video Visit: ValerieWell      Again, thank you for allowing me to participate in the care of your patient.      Sincerely,    George Sinclair MD

## 2022-04-06 PROBLEM — Z86.19 HISTORY OF PNEUMOCYSTIS JIROVECII PNEUMONIA: Status: ACTIVE | Noted: 2022-04-06

## 2022-04-06 PROBLEM — R73.03 PRE-DIABETES: Status: ACTIVE | Noted: 2022-04-06

## 2022-04-06 PROBLEM — B27.00 EBV (EPSTEIN-BARR VIRUS) VIREMIA: Status: ACTIVE | Noted: 2022-04-06

## 2022-04-08 LAB
ACID FAST STAIN (ARUP): NORMAL
ACID FAST STAIN (ARUP): NORMAL

## 2022-04-11 ENCOUNTER — LAB (OUTPATIENT)
Dept: LAB | Facility: CLINIC | Age: 62
End: 2022-04-11

## 2022-04-11 DIAGNOSIS — Z94.2 S/P LUNG TRANSPLANT (H): ICD-10-CM

## 2022-04-11 PROCEDURE — 80197 ASSAY OF TACROLIMUS: CPT | Performed by: INTERNAL MEDICINE

## 2022-04-12 ENCOUNTER — MEDICAL CORRESPONDENCE (OUTPATIENT)
Dept: HEALTH INFORMATION MANAGEMENT | Facility: CLINIC | Age: 62
End: 2022-04-12
Payer: MEDICARE

## 2022-04-14 ENCOUNTER — TRANSCRIBE ORDERS (OUTPATIENT)
Dept: OTHER | Age: 62
End: 2022-04-14
Payer: MEDICARE

## 2022-04-14 ENCOUNTER — TELEPHONE (OUTPATIENT)
Dept: TRANSPLANT | Facility: CLINIC | Age: 62
End: 2022-04-14
Payer: MEDICARE

## 2022-04-14 DIAGNOSIS — M25.562 LEFT KNEE PAIN, UNSPECIFIED CHRONICITY: Primary | ICD-10-CM

## 2022-04-14 LAB
TACROLIMUS BLD-MCNC: 23.2 UG/L (ref 5–15)
TME LAST DOSE: ABNORMAL H
TME LAST DOSE: ABNORMAL H

## 2022-04-15 ENCOUNTER — TELEPHONE (OUTPATIENT)
Dept: TRANSPLANT | Facility: CLINIC | Age: 62
End: 2022-04-15
Payer: MEDICARE

## 2022-04-15 DIAGNOSIS — Z94.2 LUNG REPLACED BY TRANSPLANT (H): ICD-10-CM

## 2022-04-15 RX ORDER — TACROLIMUS 0.5 MG/1
CAPSULE ORAL
Qty: 90 CAPSULE | Refills: 11 | Status: SHIPPED | OUTPATIENT
Start: 2022-04-15 | End: 2022-04-19

## 2022-04-15 NOTE — TELEPHONE ENCOUNTER
Called from lab with critical tacrolimus level from 4/11/22 of 23.3. LM with patient. Advised if true 12 hour trough to hold her PM dose tonight and call writer back in the morning to discuss dose change. If not trough, repeat when she can. If any questions tonight, to call back on call coordinator. Otherwise will follow up tomorrow morning with patient. Need to inquire about any med changes recently.

## 2022-04-15 NOTE — TELEPHONE ENCOUNTER
Pt held tacro dose last night. Lower dose from 1/1.5 to 0.5/1 starting today. Recheck a level mid to late next week.

## 2022-04-19 DIAGNOSIS — Z94.2 LUNG REPLACED BY TRANSPLANT (H): ICD-10-CM

## 2022-04-19 RX ORDER — TACROLIMUS 0.5 MG/1
CAPSULE ORAL
Qty: 90 CAPSULE | Refills: 11 | Status: SHIPPED | OUTPATIENT
Start: 2022-04-19 | End: 2022-06-30

## 2022-04-19 RX ORDER — TACROLIMUS 0.5 MG/1
CAPSULE ORAL
Qty: 90 CAPSULE | Refills: 11 | Status: CANCELLED | OUTPATIENT
Start: 2022-04-19

## 2022-04-22 ENCOUNTER — LAB (OUTPATIENT)
Dept: LAB | Facility: CLINIC | Age: 62
End: 2022-04-22
Payer: MEDICARE

## 2022-04-22 PROCEDURE — 80197 ASSAY OF TACROLIMUS: CPT | Performed by: PHYSICIAN ASSISTANT

## 2022-04-23 DIAGNOSIS — Z94.2 LUNG REPLACED BY TRANSPLANT (H): ICD-10-CM

## 2022-04-23 DIAGNOSIS — Z79.52 LONG TERM (CURRENT) USE OF SYSTEMIC STEROIDS: ICD-10-CM

## 2022-04-23 DIAGNOSIS — E83.42 HYPOMAGNESEMIA: ICD-10-CM

## 2022-04-23 DIAGNOSIS — Z94.2 S/P LUNG TRANSPLANT (H): ICD-10-CM

## 2022-04-24 LAB
TACROLIMUS BLD-MCNC: 9.2 UG/L (ref 5–15)
TME LAST DOSE: NORMAL H
TME LAST DOSE: NORMAL H

## 2022-04-25 NOTE — RESULT ENCOUNTER NOTE
Armand Mcdonnell,   Your tacrolimus level was 9.2 at 12 hours on 4/22/22 which is within your goal range of 8-10. No dose change at this time. Please call the transplant office ([phone]) with any questions.   Thanks,   Mikayla

## 2022-04-26 DIAGNOSIS — N18.6 END STAGE RENAL FAILURE ON DIALYSIS (H): ICD-10-CM

## 2022-04-26 DIAGNOSIS — Z99.2 END STAGE RENAL FAILURE ON DIALYSIS (H): ICD-10-CM

## 2022-04-26 RX ORDER — VIT B COMP NO.3/FOLIC/C/BIOTIN 1 MG-60 MG
1 TABLET ORAL DAILY
Qty: 30 TABLET | Refills: 11 | Status: SHIPPED | OUTPATIENT
Start: 2022-04-26 | End: 2023-07-06

## 2022-05-03 ENCOUNTER — TELEPHONE (OUTPATIENT)
Dept: TRANSPLANT | Facility: CLINIC | Age: 62
End: 2022-05-03
Payer: MEDICARE

## 2022-05-03 DIAGNOSIS — Z94.2 S/P LUNG TRANSPLANT (H): ICD-10-CM

## 2022-05-03 RX ORDER — TACROLIMUS 1 MG/1
1 CAPSULE ORAL EVERY MORNING
Qty: 30 CAPSULE | Refills: 11 | Status: SHIPPED | OUTPATIENT
Start: 2022-05-03 | End: 2022-06-30

## 2022-05-03 RX ORDER — SULFAMETHOXAZOLE AND TRIMETHOPRIM 400; 80 MG/1; MG/1
1 TABLET ORAL
Qty: 13 TABLET | Refills: 11 | Status: SHIPPED | OUTPATIENT
Start: 2022-05-04 | End: 2023-03-17

## 2022-05-03 NOTE — LETTER
PHYSICIAN ORDERS      DATE & TIME ISSUED: May 3, 2022 3:16 PM  PATIENT NAME: Kecia Blue   : 1960     [facility] MR# [if applicable]: [id number]     DIAGNOSIS:  Lung Transplant  Z94.2    Pt needs COVID PCR test on 22. DOS 22.       Any questions please call: Mikayla     Please fax these results to [phone].        Ame Chow PA-C

## 2022-05-03 NOTE — TELEPHONE ENCOUNTER
Pt calls with a few questions. Scheduled for visit next week, but bronch not scheduled. Per last IP bronch note, plan for endo bronch inspection in 3 months and may not need routine bronchs anymore. Will change appt to 5/26 with endo bronch later that morning, pt prefers same day due to coming from out of town. COVID test on 5/23/22, order faxed to local clinic.     Pt wondering status of kidney transplant listing, will message coordinator.     Few refills sent to local pharmacy.

## 2022-05-04 ENCOUNTER — COMMITTEE REVIEW (OUTPATIENT)
Dept: TRANSPLANT | Facility: CLINIC | Age: 62
End: 2022-05-04
Payer: MEDICARE

## 2022-05-04 NOTE — COMMITTEE REVIEW
Abdominal Committee Review Note     Evaluation Date: 3/8/2022  Committee Review Date: 5/4/2022    Organ being evaluated for: Kidney    Transplant Phase: Evaluation  Transplant Status: Active    Transplant Coordinator: Yenni Titus  Transplant Surgeon:   Toby Hernandez    Referring Physician: Ame Servin    Primary Diagnosis:   Secondary Diagnosis:     Committee Review Members:  Mikki, Montse Ewing RD   Nephrology Erich Rose, APRN CNP, George Sinclair MD   Pharmacy Rikki Leonard, [facility]    - Clinical Leandra Shandra Sosa, [facility], Eugenia Pearson, [facility]   Transplant Lauren Sigala PA-C, Tatyana Ricks, RN, Eugenia Resendiz, RN, Elsy Pineda, DOMINIC, Ayana Hastings, LOUISE, Arianne Lucas, LOUISE, Melani Henning MD, Yenni Titus, RN   Transplant Surgery Melani Henning MD       Transplant Eligibility: Irreversible chronic kidney disease treated w/dialysis or expected need for dialysis    Committee Review Decision: Needs Re-presentation    Relative Contraindications: Other, lung disease/infection    Absolute Contraindications:     Committee Chair Melani Henning MD verbally attested to the committee's decision.    Committee Discussion Details: Reviewed pt's medical status and evaluation results to date.  Pt is determined to be a fair candidate for kidney transplant. Dr. Sinclair recommends the following items be completed as part of the pt's evaluation:   1. Cardiology consult with nuclear medicine stress test.  2. Hepatology consult/follow up for history of liver issues (fibrosis on biopsy, history of portal hypertension)  3. In person surgeon and frailty assessment due to knee pain  4. View imaging from 2019.    Patient should register living donors.    Will not list the pt inactive status at this time as she is on dialysis. Pt will be rediscussed once the above are complete.

## 2022-05-09 DIAGNOSIS — Z11.59 ENCOUNTER FOR SCREENING FOR OTHER VIRAL DISEASES: Primary | ICD-10-CM

## 2022-05-10 ENCOUNTER — TELEPHONE (OUTPATIENT)
Dept: TRANSPLANT | Facility: CLINIC | Age: 62
End: 2022-05-10

## 2022-05-10 DIAGNOSIS — Z94.2 S/P LUNG TRANSPLANT (H): ICD-10-CM

## 2022-05-10 RX ORDER — PREDNISONE 5 MG/1
5 TABLET ORAL EVERY MORNING
Qty: 30 TABLET | Refills: 11 | Status: SHIPPED | OUTPATIENT
Start: 2022-05-10 | End: 2023-05-25

## 2022-05-10 RX ORDER — PREDNISONE 2.5 MG/1
2.5 TABLET ORAL EVERY EVENING
Qty: 30 TABLET | Refills: 11 | Status: SHIPPED | OUTPATIENT
Start: 2022-05-10 | End: 2023-04-06

## 2022-05-10 NOTE — TELEPHONE ENCOUNTER
Patient Call: Medication Refill  Route to LPN  Instruct the patient to first contact their pharmacy. If they have called their pharmacy and require further assistance, route to LPN.    Pharmacy Name: [facility]  Pharmacy Location: [place], [place]   Name of Medication: Predinsone Dose:  5mg and 2.5 mg tablets  30 day plus refills   When will the patient be out of this medication?: Less than 3 days (Route high priority)

## 2022-05-12 DIAGNOSIS — M25.569 KNEE PAIN: Primary | ICD-10-CM

## 2022-05-12 NOTE — TELEPHONE ENCOUNTER
DIAGNOSIS: Eval for TKA) for Left Knee Pain    APPOINTMENT DATE: 05.19.2022   NOTES STATUS DETAILS   OFFICE NOTE from referring provider Receivd 04.22.2022 Dr Alli Dupree MD at [facility]    OFFICE NOTE from other specialist     DISCHARGE SUMMARY from hospital     DISCHARGE REPORT from the ER     OPERATIVE REPORT     MEDICATION LIST Received  Care Everywhere / Internal 04.22.2022   EMG (for Spine)     IMPLANT RECORD/STICKER     LABS     CBC/DIFF Internal 03.28.2022   CULTURES     INJECTIONS DONE IN RADIOLOGY Care Everywhere 12.13.2021 08.08.2019   MRI Care Everywhere 08.12.2019 MRI OF THE LEFT KNEE    CT SCAN     XRAYS (IMAGES & REPORTS) Care Everywhere 08.08.2019 XR  KNEE LT   TUMOR     PATHOLOGY  Slides & report       Action 05.12.2022 RM   Action Taken Pending image     Action 05.16.2022 RM   Action Taken Called Chelsey to get images pushed called [phone] [facility] for images.     Action 05.17.2022 RM   Action Taken Was told to  Call [facility] [phone] spoke to a rep at [facility] who states they only have the MRI not the xray. Chelsey states they don't have the Xray     Action 05.18.2022 RM   Action Taken Images received and uploaded to chart.

## 2022-05-19 ENCOUNTER — PRE VISIT (OUTPATIENT)
Dept: ORTHOPEDICS | Facility: CLINIC | Age: 62
End: 2022-05-19

## 2022-05-19 ENCOUNTER — OFFICE VISIT (OUTPATIENT)
Dept: ORTHOPEDICS | Facility: CLINIC | Age: 62
End: 2022-05-19
Payer: MEDICARE

## 2022-05-19 ENCOUNTER — ANCILLARY PROCEDURE (OUTPATIENT)
Dept: BONE DENSITY | Facility: CLINIC | Age: 62
End: 2022-05-19
Attending: PHYSICIAN ASSISTANT
Payer: MEDICARE

## 2022-05-19 DIAGNOSIS — Z79.52 LONG TERM (CURRENT) USE OF SYSTEMIC STEROIDS: ICD-10-CM

## 2022-05-19 DIAGNOSIS — E83.42 HYPOMAGNESEMIA: ICD-10-CM

## 2022-05-19 DIAGNOSIS — Z94.2 LUNG REPLACED BY TRANSPLANT (H): ICD-10-CM

## 2022-05-19 DIAGNOSIS — M25.562 LEFT KNEE PAIN, UNSPECIFIED CHRONICITY: ICD-10-CM

## 2022-05-19 DIAGNOSIS — Z94.2 S/P LUNG TRANSPLANT (H): ICD-10-CM

## 2022-05-19 PROCEDURE — 99204 OFFICE O/P NEW MOD 45 MIN: CPT | Performed by: ORTHOPAEDIC SURGERY

## 2022-05-19 PROCEDURE — 77080 DXA BONE DENSITY AXIAL: CPT | Performed by: INTERNAL MEDICINE

## 2022-05-19 ASSESSMENT — KOOS JR
KOOS JR SCORING: 8.29
GOING UP OR DOWN STAIRS: EXTREME
TWISING OR PIVOTING ON KNEE: EXTREME
STANDING UPRIGHT: EXTREME
RISING FROM SITTING: SEVERE
STRAIGHTENING KNEE FULLY: EXTREME
BENDING TO THE FLOOR TO PICK UP OBJECT: EXTREME
HOW SEVERE IS YOUR KNEE STIFFNESS AFTER FIRST WAKING IN MORNING: EXTREME

## 2022-05-19 NOTE — PROGRESS NOTES
Assessment: This is a 61 year old with severe knee degenerative changes with notable bone loss and poor bone quality and which associated with severe, debilitating symptoms whose care is complicated by her complicated medical history including a lung translate and kidney failure being managed with dialysis. Of note she has severe knee OA with advanced bone loss and translation of the femur on the tibia radiographically and a very large effusion but uninhibited ROM clinically. This raises the possibility of a Charcot-like joint. After reviewing these radiographs and reviewing the case with another arthroplasty faculty I that she should be evaluated in Neurology for a neuropathic joint.   Plan:  Neuro eval, risk stratification in transplant/renal clinics. RTC with phone/video appointment to discuss.      Chief Complaint: Consult (Left knee pain. Was to have a TKA done 4/5, was cancelled one hour prior to surgery because the facility did not have in patient dialysis.)      Physician:  Referred Self    HPI: Kecia Blue is a 61 year old female who presents today for evaluation of her left knee. Reports that she was to have total knee last month at OSH but was canceled on the day of surgery.     FABIAN Jr: 8.29    MEDICAL HISTORY:   Past Medical History:   Diagnosis Date     Acute on chronic respiratory failure with hypoxia (H) 02/21/2018     Anisocoria      Antisynthetase syndrome (H) 2014     Anxiety      Aspergillus (H)      Aspergillus pneumonia (H) 11/20/2020     Benign essential hypertension      C. difficile colitis      Cytomegalovirus (CMV) viremia (H)      Dermatomyositis (H)      Dysplasia of cervix, low grade (ESTRADA 1)      EBV (Franklin-Barr virus) viremia      ESRD (end stage renal disease) on dialysis (H)      ILD (interstitial lung disease) (H)      Lung replaced by transplant (H)      Osteopenia      Paroxysmal atrial fibrillation (H)      Pneumocystis jiroveci pneumonia (H)      PONV (postoperative  nausea and vomiting)      Post-menopause      Pulmonary hypertension (H)      Raynaud's disease      Seronegative rheumatoid arthritis (H)        Medications:     Current Outpatient Medications:      acetaminophen (TYLENOL) 325 MG tablet, Take 1 tablet (325 mg) by mouth every 4 hours as needed for mild pain or fever, Disp: 60 tablet, Rfl:      albuterol (PROVENTIL) (2.5 MG/3ML) 0.083% neb solution, Take 1 vial (2.5 mg) by nebulization daily, Disp: 360 mL, Rfl: 4     azaTHIOprine (IMURAN) 50 MG tablet, Take 0.5 tablets (25 mg) by mouth daily, Disp: 45 tablet, Rfl: 3     budesonide (PULMICORT) 0.5 MG/2ML neb solution, Take 2 mLs (0.5 mg) by nebulization daily, Disp: 60 mL, Rfl: 11     calcium acetate (PHOSLO) 667 MG CAPS capsule, Take 667 mg by mouth daily with food (largest meal), Disp: , Rfl:      Magnesium Cl-Calcium Carbonate (SLOW-MAG) 71.5-119 MG TBEC, Take 3 tablets by mouth four times a week Take on non dialysis days T/Th/Sa/Su, Disp: 90 tablet, Rfl: 3     metoprolol tartrate (LOPRESSOR) 25 MG tablet, 1 tablet (25 mg) by Oral or Feeding Tube route 2 times daily, Disp: 60 tablet, Rfl: 11     multivitamin RENAL (RENAVITE RX/NEPHROVITE) 1 MG tablet, Take 1 tablet by mouth daily, Disp: 30 tablet, Rfl: 11     pantoprazole (PROTONIX) 40 MG EC tablet, Take 1 tablet (40 mg) by mouth every morning (before breakfast), Disp: 30 tablet, Rfl: 11     posaconazole (NOXAFIL) 100 MG EC tablet, Take 3 tablets (300 mg) by mouth daily, Disp: 90 tablet, Rfl: 11     predniSONE (DELTASONE) 2.5 MG tablet, Take 1 tablet (2.5 mg) by mouth every evening, Disp: 30 tablet, Rfl: 11     predniSONE (DELTASONE) 5 MG tablet, Take 1 tablet (5 mg) by mouth every morning, Disp: 30 tablet, Rfl: 11     sulfamethoxazole-trimethoprim (BACTRIM) 400-80 MG tablet, Take 1 tablet by mouth Every Mon, Wed, Fri Morning, Disp: 13 tablet, Rfl: 11     tacrolimus (GENERIC EQUIVALENT) 0.5 MG capsule, Take 1 capsule (0.5 mg) by mouth every morning AND 2 capsules  (1 mg) every evening., Disp: 90 capsule, Rfl: 11     tacrolimus (GENERIC EQUIVALENT) 1 MG capsule, Take 1 capsule (1 mg) by mouth every morning Total dose: 1 mg in the AM and 0.5 mg in the PM, Disp: 30 capsule, Rfl: 11    Allergies: Patient has no known allergies.    SURGICAL HISTORY:   Past Surgical History:   Procedure Laterality Date     BRONCHOSCOPY (RIGID OR FLEXIBLE), DIAGNOSTIC N/A 4/10/2018    Procedure: COMBINED BRONCHOSCOPY (RIGID OR FLEXIBLE), LAVAGE;;  Surgeon: Mariposa Donohue MD;  Location: [facility]     BRONCHOSCOPY (RIGID OR FLEXIBLE), DIAGNOSTIC N/A 12/23/2020    Procedure: BRONCHOSCOPY, WITH BRONCHOALVEOLAR LAVAGE;  Surgeon: Uri Bass MD;  Location: [facility]     BRONCHOSCOPY (RIGID OR FLEXIBLE), DILATE BRONCHUS / TRACHEA N/A 10/11/2018    Procedure: BRONCHOSCOPY (RIGID OR FLEXIBLE), DILATE BRONCHUS / TRACHEA;  Flexible And Rigid Bronchoscopy and Dilation;  Surgeon: Wilber Lin MD;  [facility]     BRONCHOSCOPY FLEXIBLE N/A 3/13/2018    Procedure: BRONCHOSCOPY FLEXIBLE;  Flexible Bronchoscopy ;  Surgeon: Gissell Sanchez MD;  Location: [facility]     BRONCHOSCOPY FLEXIBLE N/A 5/9/2018    Procedure: BRONCHOSCOPY FLEXIBLE;;  Surgeon: Wilber Lin MD;  Location: [facility]     BRONCHOSCOPY FLEXIBLE AND RIGID N/A 9/10/2018    Procedure: BRONCHOSCOPY FLEXIBLE AND RIGID;  Flexible and Rigid Bronchoscopy with Balloon Dilation, tissue debulking with cryo, and Right mainstem bronchus stent placement;  Surgeon: Wilber Lin MD;  [facility]     BRONCHOSCOPY RIGID N/A 6/6/2018    Procedure: BRONCHOSCOPY RIGID;;  Surgeon: Lopez Macias MD;  Location: [facility]     BRONCHOSCOPY, DILATE BRONCHUS, STENT BRONCHUS, COMBINED N/A 6/11/2018    Procedure: COMBINED BRONCHOSCOPY, DILATE BRONCHUS, STENT BRONCHUS;  Flexible Bronchoscopy, Balloon Dilation, Bronchial Washings;  Surgeon: Wilber Lin MD;  [facility]     BRONCHOSCOPY, DILATE BRONCHUS, STENT BRONCHUS,  COMBINED Right 7/10/2018    Procedure: COMBINED BRONCHOSCOPY, DILATE BRONCHUS, STENT BRONCHUS;  Flexible Bronchoscopy, Balloon Dilation, Bronchial Washings  ;  Surgeon: Wilber Lin MD;  [facility]     BRONCHOSCOPY, DILATE BRONCHUS, STENT BRONCHUS, COMBINED N/A 8/2/2018    Procedure: COMBINED BRONCHOSCOPY, DILATE BRONCHUS, STENT BRONCHUS;  Flexible Bronchoscopy, Bronchial Washings, Balloon Dilation;  Surgeon: Wilber Lin MD;  [facility]     BRONCHOSCOPY, DILATE BRONCHUS, STENT BRONCHUS, COMBINED N/A 8/20/2018    Procedure: COMBINED BRONCHOSCOPY, DILATE BRONCHUS, STENT BRONCHUS;  Flexible Bronchoscopy, Balloon Dilation;  Surgeon: Wilber Lin MD;  [facility]     BRONCHOSCOPY, DILATE BRONCHUS, STENT BRONCHUS, COMBINED N/A 10/29/2018    Procedure: Flexible Bronchoscopy, Balloon Dilation, Stent Revision, Airway Examination And Therapeutic Suctioning, Cyro Tumor Debulking;  Surgeon: Wilber Lin MD;  [facility]     BRONCHOSCOPY, DILATE BRONCHUS, STENT BRONCHUS, COMBINED N/A 11/20/2018    Procedure: Rigid Bronchoscopy, Stent Removal and dilitation;  Surgeon: Wilber Lin MD;  [facility]     BRONCHOSCOPY, DILATE BRONCHUS, STENT BRONCHUS, COMBINED N/A 12/14/2018    Procedure: Flexible And Rigid Bronchoscopy, Balloon Dilation, Bronchial Washing;  Surgeon: Wilber Lin MD;  [facility]     BRONCHOSCOPY, DILATE BRONCHUS, STENT BRONCHUS, COMBINED N/A 1/17/2019    Procedure: Flexible And Rigid Bronchoscopy, Balloon Dilation.;  Surgeon: Wilber Lin MD;  [facility]     BRONCHOSCOPY, DILATE BRONCHUS, STENT BRONCHUS, COMBINED N/A 3/7/2019    Procedure: Flexible and Rigid Bronchoscopy, Bronchial Washing, Balloon Dilation;  Surgeon: Wilber Lin MD;  [facility]     BRONCHOSCOPY, DILATE BRONCHUS, STENT BRONCHUS, COMBINED N/A 6/6/2019    Procedure: Rigid and Flexible Bronchoscopy, Balloon Dilation;  Surgeon: Delia  Wilber Warner MD;  [facility]     BRONCHOSCOPY, DILATE BRONCHUS, STENT BRONCHUS, COMBINED N/A 10/11/2019    Procedure: Flexible and Rigid Bronchoscopy, Balloon Dilation, Bronchoalveolar Lagave;  Surgeon: Wilber Lin MD;  [facility]     BRONCHOSCOPY, DILATE BRONCHUS, STENT BRONCHUS, COMBINED N/A 2/19/2021    Procedure: BRONCHOSCOPY, flexible, airway dilation, and bronchial wash;  Surgeon: Wilber Lin MD;  [facility]     BRONCHOSCOPY, DILATE BRONCHUS, STENT BRONCHUS, COMBINED N/A 4/9/2021    Procedure: BRONCHOSCOPY, flexible and rigid, Airway suctioning;  Surgeon: Mati Norris MD;  [facility]     CV RIGHT HEART CATH MEASUREMENTS RECORDED N/A 3/10/2020    Procedure: CV RIGHT HEART CATH;  Surgeon: Wai Garcia MD;  [facility]     ENT SURGERY      tonsillectomy as a child     ESOPHAGOSCOPY, GASTROSCOPY, DUODENOSCOPY (EGD), COMBINED N/A 10/29/2018    Procedure: COMBINED ESOPHAGOSCOPY, GASTROSCOPY, DUODENOSCOPY (EGD) with biopsies and polypectomy;  Surgeon: Chente Bloom MD;  [facility]     INSERT EXTRACORPORAL MEMBRANE OXYGENATOR ADULT (OUTSIDE OR) N/A 2/27/2018    Procedure: INSERT EXTRACORPORAL MEMBRANE OXYGENATOR ADULT (OUTSIDE OR);  INSERT EXTRACORPORAL MEMBRANE OXYGENATOR ADULT (OUTSIDE OR) ;  Surgeon: Toby Hernandez MD;  [facility]     IR CVC TUNNEL PLACEMENT > 5 YRS OF AGE  10/25/2019     IR DIALYSIS FISTULOGRAM LEFT  3/2/2021     IR DIALYSIS MECH THROMB, PTA  3/2/2021     IR FLUORO 0-1 HOUR  5/7/2021     IR GASTRO JEJUNOSTOMY TUBE PLACEMENT  2/16/2021     IR PARACENTESIS  1/8/2020     IR THORACENTESIS  9/13/2019     IR TRANSCATHETER BIOPSY  1/8/2020     LASER CO2 BRONCHOSCOPY N/A 4/30/2021    Procedure: Flexible and Rigid Bronchoscopy and Tissue Debulking with CO2 Laser Assistance;  Surgeon: Mati Norris MD;  [facility]     LASER CO2 BRONCHOSCOPY N/A 6/11/2021    Procedure: BRONCHOSCOPY,  Flexible and Rigid Bronchoscopy, Tissue Debulking with cryo Assistance, airway dilation,;  Surgeon: Mati Norris MD;  [facility]     LASER CO2 BRONCHOSCOPY N/A 9/16/2021    Procedure: BRONCHOSCOPY, flexible and rigid, CO2 Laser Debulking;  Surgeon: Mati Norris MD;  [facility]     LASER CO2 BRONCHOSCOPY N/A 2/11/2022    Procedure: flexible, rigid bronchoscopy, tissue debulking, airway dilation, co2 laser, bronchoalveolar lavage;  Surgeon: Juana Baugh MD;  [facility]     no prior surgery       REMOVE EXTRACORPORAL MEMBRANE OXYGENATOR ADULT N/A 3/3/2018    Procedure: REMOVE EXTRACORPORAL MEMBRANE OXYGENATOR ADULT;  Removal of Right Femoral Venous and Right Axillary Arterial Extracorporeal Membrane Oxygenator;  Surgeon: Toby Hernandez MD;  [facility]     TRANSPLANT LUNG RECIPIENT SINGLE X2 Bilateral 3/1/2018    Procedure: TRANSPLANT LUNG RECIPIENT SINGLE X2;  Median Sternotomy, Extracorporeal Membrane Oxygenator, bilateral sequential lung transplant;  Surgeon: Toby Hernandez MD;  [facility]       FAMILY HISTORY:   Family History   Problem Relation Age of Onset     Hypertension Mother      Arthritis Mother      Pancreatic Cancer Father      Diabetes Father        SOCIAL HISTORY:   Social History     Tobacco Use     Smoking status: Never Smoker     Smokeless tobacco: Never Used   Substance Use Topics     Alcohol use: No     Alcohol/week: 1.0 standard drink     Types: 1 Glasses of wine per week       REVIEW OF SYSTEMS:  The comprehensive review of systems from the intake form was reviewed with the patient.  No fever, weight change or fatigue. No dry eyes. No oral ulcers, sore throat or voice change. No palpitations, syncope, angina or edema.  No chest pain, excessive sleepiness, shortness of breath or hemoptysis.   No abdominal pain, nausea, vomiting, diarrhea or heartburn.  No skin rash. No focal weakness or numbness. No bleeding or lymphadenopathy. No rhinitis  or hives.     Exam:  On physical examination the patient appears the stated age, is in no acute distress, affectThe is appropriate, and breathing is non-labored.  Vitals are documented in the EMR and have been reviewed:    LMP 06/07/2014 (Exact Date)   Data Unavailable  There is no height or weight on file to calculate BMI.    Rises from chair:   Gait:  Trendelenburg test:  Gains the exam table:      Left Knee  Clinical alignment: neutral   Effusion: large/maximum  Tenderness to palpation: medial and lateral joint line, modest   Extension:5  Flexion: 105  Collateral ligaments: intact  Cruciate ligaments: grossly intact   Moves joint rapidly through ROM without inhibition.     Distally, the circulatory, motor, and sensation exam is intact with 5/5 EHL, gastroc-soleus, and tibialis anterior.Unable to palpate dorsalis pedis or posterior tibialis pulse.  There are no sores on the feet, no bruising, and no lymphedema. Feet are cool and consistent with Raynauds. Reports that her sensation is normal     X-rays:     PACS Images     Show images for XR Knee Left 3 Views         Study Result    Narrative & Impression   3 views left knee radiographs 5/19/2022 10:15 AM     History: Knee pain      Comparison: None     Findings:     AP, lateral and patellofemoral views of the left knee were obtained.      No acute osseous abnormality. Moderate joint effusion. Diffuse soft  tissue mineralization about the knee. Severe medial and lateral  compartment joint space loss. No patellar tilt or subluxation.  Juxta-articular erosions involving the periphery of the femoral  condyles and lateral tibial plateau.                                                                      Impression:  1. Juxta-articular erosions with soft tissue calcification (likely  tophus) about the knee suspicious for gout arthropathy.  2. Severe medial and lateral compartment joint space loss.     NEMESIO CORREA MD (Joe)

## 2022-05-19 NOTE — LETTER
5/19/2022         RE: Kecia Blue  [address] [place] [id number]        Dear Colleague,    Thank you for referring your patient, Kecia Blue, to the [facility]. Please see a copy of my visit note below.    Assessment: This is a 61 year old with severe knee degenerative changes with notable bone loss and poor bone quality and which associated with severe, debilitating symptoms whose care is complicated by her complicated medical history including a lung translate and kidney failure being managed with dialysis. Of note she has severe knee OA with advanced bone loss and translation of the femur on the tibia radiographically and a very large effusion but uninhibited ROM clinically. This raises the possibility of a Charcot-like joint. After reviewing these radiographs and reviewing the case with another arthroplasty faculty I that she should be evaluated in Neurology for a neuropathic joint.   Plan:  Neuro eval, risk stratification in transplant/renal clinics. RTC with phone/video appointment to discuss.      Chief Complaint: Consult (Left knee pain. Was to have a TKA done 4/5, was cancelled one hour prior to surgery because the facility did not have in patient dialysis.)      Physician:  Referred Self    HPI: Kecia Blue is a 61 year old female who presents today for evaluation of her left knee. Reports that she was to have total knee last month at OSH but was canceled on the day of surgery.     FABIAN Jr: 8.29    MEDICAL HISTORY:   Past Medical History:   Diagnosis Date     Acute on chronic respiratory failure with hypoxia (H) 02/21/2018     Anisocoria      Antisynthetase syndrome (H) 2014     Anxiety      Aspergillus (H)      Aspergillus pneumonia (H) 11/20/2020     Benign essential hypertension      C. difficile colitis      Cytomegalovirus (CMV) viremia (H)      Dermatomyositis (H)      Dysplasia of cervix, low grade (ESTRAAD 1)      EBV  (Franklin-Barr virus) viremia      ESRD (end stage renal disease) on dialysis (H)      ILD (interstitial lung disease) (H)      Lung replaced by transplant (H)      Osteopenia      Paroxysmal atrial fibrillation (H)      Pneumocystis jiroveci pneumonia (H)      PONV (postoperative nausea and vomiting)      Post-menopause      Pulmonary hypertension (H)      Raynaud's disease      Seronegative rheumatoid arthritis (H)        Medications:     Current Outpatient Medications:      acetaminophen (TYLENOL) 325 MG tablet, Take 1 tablet (325 mg) by mouth every 4 hours as needed for mild pain or fever, Disp: 60 tablet, Rfl:      albuterol (PROVENTIL) (2.5 MG/3ML) 0.083% neb solution, Take 1 vial (2.5 mg) by nebulization daily, Disp: 360 mL, Rfl: 4     azaTHIOprine (IMURAN) 50 MG tablet, Take 0.5 tablets (25 mg) by mouth daily, Disp: 45 tablet, Rfl: 3     budesonide (PULMICORT) 0.5 MG/2ML neb solution, Take 2 mLs (0.5 mg) by nebulization daily, Disp: 60 mL, Rfl: 11     calcium acetate (PHOSLO) 667 MG CAPS capsule, Take 667 mg by mouth daily with food (largest meal), Disp: , Rfl:      Magnesium Cl-Calcium Carbonate (SLOW-MAG) 71.5-119 MG TBEC, Take 3 tablets by mouth four times a week Take on non dialysis days T/Th/Sa/Su, Disp: 90 tablet, Rfl: 3     metoprolol tartrate (LOPRESSOR) 25 MG tablet, 1 tablet (25 mg) by Oral or Feeding Tube route 2 times daily, Disp: 60 tablet, Rfl: 11     multivitamin RENAL (RENAVITE RX/NEPHROVITE) 1 MG tablet, Take 1 tablet by mouth daily, Disp: 30 tablet, Rfl: 11     pantoprazole (PROTONIX) 40 MG EC tablet, Take 1 tablet (40 mg) by mouth every morning (before breakfast), Disp: 30 tablet, Rfl: 11     posaconazole (NOXAFIL) 100 MG EC tablet, Take 3 tablets (300 mg) by mouth daily, Disp: 90 tablet, Rfl: 11     predniSONE (DELTASONE) 2.5 MG tablet, Take 1 tablet (2.5 mg) by mouth every evening, Disp: 30 tablet, Rfl: 11     predniSONE (DELTASONE) 5 MG tablet, Take 1 tablet (5 mg) by mouth every  morning, Disp: 30 tablet, Rfl: 11     sulfamethoxazole-trimethoprim (BACTRIM) 400-80 MG tablet, Take 1 tablet by mouth Every Mon, Wed, Fri Morning, Disp: 13 tablet, Rfl: 11     tacrolimus (GENERIC EQUIVALENT) 0.5 MG capsule, Take 1 capsule (0.5 mg) by mouth every morning AND 2 capsules (1 mg) every evening., Disp: 90 capsule, Rfl: 11     tacrolimus (GENERIC EQUIVALENT) 1 MG capsule, Take 1 capsule (1 mg) by mouth every morning Total dose: 1 mg in the AM and 0.5 mg in the PM, Disp: 30 capsule, Rfl: 11    Allergies: Patient has no known allergies.    SURGICAL HISTORY:   Past Surgical History:   Procedure Laterality Date     BRONCHOSCOPY (RIGID OR FLEXIBLE), DIAGNOSTIC N/A 4/10/2018    Procedure: COMBINED BRONCHOSCOPY (RIGID OR FLEXIBLE), LAVAGE;;  Surgeon: Mariposa Donohue MD;  [facility]     BRONCHOSCOPY (RIGID OR FLEXIBLE), DIAGNOSTIC N/A 12/23/2020    Procedure: BRONCHOSCOPY, WITH BRONCHOALVEOLAR LAVAGE;  Surgeon: Uri Bass MD;  [facility]     BRONCHOSCOPY (RIGID OR FLEXIBLE), DILATE BRONCHUS / TRACHEA N/A 10/11/2018    Procedure: BRONCHOSCOPY (RIGID OR FLEXIBLE), DILATE BRONCHUS / TRACHEA;  Flexible And Rigid Bronchoscopy and Dilation;  Surgeon: Wilber Lin MD;  [facility]     BRONCHOSCOPY FLEXIBLE N/A 3/13/2018    Procedure: BRONCHOSCOPY FLEXIBLE;  Flexible Bronchoscopy ;  Surgeon: Gissell Sanchez MD;  [facility]     BRONCHOSCOPY FLEXIBLE N/A 5/9/2018    Procedure: BRONCHOSCOPY FLEXIBLE;;  Surgeon: Wilber Lin MD;  [facility]     BRONCHOSCOPY FLEXIBLE AND RIGID N/A 9/10/2018    Procedure: BRONCHOSCOPY FLEXIBLE AND RIGID;  Flexible and Rigid Bronchoscopy with Balloon Dilation, tissue debulking with cryo, and Right mainstem bronchus stent placement;  Surgeon: Wilber Lin MD;  [facility]     BRONCHOSCOPY RIGID N/A 6/6/2018    Procedure: BRONCHOSCOPY RIGID;;  Surgeon: Lopez Macias MD;  [facility]     BRONCHOSCOPY, DILATE  BRONCHUS, STENT BRONCHUS, COMBINED N/A 6/11/2018    Procedure: COMBINED BRONCHOSCOPY, DILATE BRONCHUS, STENT BRONCHUS;  Flexible Bronchoscopy, Balloon Dilation, Bronchial Washings;  Surgeon: Wilber Lin MD;  [facility]     BRONCHOSCOPY, DILATE BRONCHUS, STENT BRONCHUS, COMBINED Right 7/10/2018    Procedure: COMBINED BRONCHOSCOPY, DILATE BRONCHUS, STENT BRONCHUS;  Flexible Bronchoscopy, Balloon Dilation, Bronchial Washings  ;  Surgeon: Wilber Lin MD;  [facility]     BRONCHOSCOPY, DILATE BRONCHUS, STENT BRONCHUS, COMBINED N/A 8/2/2018    Procedure: COMBINED BRONCHOSCOPY, DILATE BRONCHUS, STENT BRONCHUS;  Flexible Bronchoscopy, Bronchial Washings, Balloon Dilation;  Surgeon: Wilber Lin MD;  [facility]     BRONCHOSCOPY, DILATE BRONCHUS, STENT BRONCHUS, COMBINED N/A 8/20/2018    Procedure: COMBINED BRONCHOSCOPY, DILATE BRONCHUS, STENT BRONCHUS;  Flexible Bronchoscopy, Balloon Dilation;  Surgeon: Wilber Lin MD;  [facility]     BRONCHOSCOPY, DILATE BRONCHUS, STENT BRONCHUS, COMBINED N/A 10/29/2018    Procedure: Flexible Bronchoscopy, Balloon Dilation, Stent Revision, Airway Examination And Therapeutic Suctioning, Cyro Tumor Debulking;  Surgeon: Wilber Lin MD;  [facility]     BRONCHOSCOPY, DILATE BRONCHUS, STENT BRONCHUS, COMBINED N/A 11/20/2018    Procedure: Rigid Bronchoscopy, Stent Removal and dilitation;  Surgeon: Wilber Lin MD;  [facility]     BRONCHOSCOPY, DILATE BRONCHUS, STENT BRONCHUS, COMBINED N/A 12/14/2018    Procedure: Flexible And Rigid Bronchoscopy, Balloon Dilation, Bronchial Washing;  Surgeon: Wilber Lin MD;  [facility]     BRONCHOSCOPY, DILATE BRONCHUS, STENT BRONCHUS, COMBINED N/A 1/17/2019    Procedure: Flexible And Rigid Bronchoscopy, Balloon Dilation.;  Surgeon: Wilber Lin MD;  [facility]     BRONCHOSCOPY, DILATE BRONCHUS, STENT BRONCHUS, COMBINED N/A 3/7/2019     Procedure: Flexible and Rigid Bronchoscopy, Bronchial Washing, Balloon Dilation;  Surgeon: Wilber Lin MD;  [facility]     BRONCHOSCOPY, DILATE BRONCHUS, STENT BRONCHUS, COMBINED N/A 6/6/2019    Procedure: Rigid and Flexible Bronchoscopy, Balloon Dilation;  Surgeon: Wilber Lin MD;  [facility]     BRONCHOSCOPY, DILATE BRONCHUS, STENT BRONCHUS, COMBINED N/A 10/11/2019    Procedure: Flexible and Rigid Bronchoscopy, Balloon Dilation, Bronchoalveolar Lagave;  Surgeon: Wilber Lin MD;  [facility]     BRONCHOSCOPY, DILATE BRONCHUS, STENT BRONCHUS, COMBINED N/A 2/19/2021    Procedure: BRONCHOSCOPY, flexible, airway dilation, and bronchial wash;  Surgeon: Wilber Lin MD;  [facility]     BRONCHOSCOPY, DILATE BRONCHUS, STENT BRONCHUS, COMBINED N/A 4/9/2021    Procedure: BRONCHOSCOPY, flexible and rigid, Airway suctioning;  Surgeon: Mati Norris MD;  [facility]     CV RIGHT HEART CATH MEASUREMENTS RECORDED N/A 3/10/2020    Procedure: CV RIGHT HEART CATH;  Surgeon: Wai Garcia MD;  [facility]     ENT SURGERY      tonsillectomy as a child     ESOPHAGOSCOPY, GASTROSCOPY, DUODENOSCOPY (EGD), COMBINED N/A 10/29/2018    Procedure: COMBINED ESOPHAGOSCOPY, GASTROSCOPY, DUODENOSCOPY (EGD) with biopsies and polypectomy;  Surgeon: Chente Bloom MD;  [facility]     INSERT EXTRACORPORAL MEMBRANE OXYGENATOR ADULT (OUTSIDE OR) N/A 2/27/2018    Procedure: INSERT EXTRACORPORAL MEMBRANE OXYGENATOR ADULT (OUTSIDE OR);  INSERT EXTRACORPORAL MEMBRANE OXYGENATOR ADULT (OUTSIDE OR) ;  Surgeon: Toby Hernandez MD;  [facility]     IR CVC TUNNEL PLACEMENT > 5 YRS OF AGE  10/25/2019     IR DIALYSIS FISTULOGRAM LEFT  3/2/2021     IR DIALYSIS MECH THROMB, PTA  3/2/2021     IR FLUORO 0-1 HOUR  5/7/2021     IR GASTRO JEJUNOSTOMY TUBE PLACEMENT  2/16/2021     IR PARACENTESIS  1/8/2020     IR THORACENTESIS  9/13/2019      IR TRANSCATHETER BIOPSY  1/8/2020     LASER CO2 BRONCHOSCOPY N/A 4/30/2021    Procedure: Flexible and Rigid Bronchoscopy and Tissue Debulking with CO2 Laser Assistance;  Surgeon: Mati Norris MD;  [facility]     LASER CO2 BRONCHOSCOPY N/A 6/11/2021    Procedure: BRONCHOSCOPY, Flexible and Rigid Bronchoscopy, Tissue Debulking with cryo Assistance, airway dilation,;  Surgeon: Mati Norris MD;  [facility]     LASER CO2 BRONCHOSCOPY N/A 9/16/2021    Procedure: BRONCHOSCOPY, flexible and rigid, CO2 Laser Debulking;  Surgeon: Mati Norris MD;  [facility]     LASER CO2 BRONCHOSCOPY N/A 2/11/2022    Procedure: flexible, rigid bronchoscopy, tissue debulking, airway dilation, co2 laser, bronchoalveolar lavage;  Surgeon: Juana Baugh MD;  Location:  OR     no prior surgery       REMOVE EXTRACORPORAL MEMBRANE OXYGENATOR ADULT N/A 3/3/2018    Procedure: REMOVE EXTRACORPORAL MEMBRANE OXYGENATOR ADULT;  Removal of Right Femoral Venous and Right Axillary Arterial Extracorporeal Membrane Oxygenator;  Surgeon: Toby Hernandez MD;  Location:  OR     TRANSPLANT LUNG RECIPIENT SINGLE X2 Bilateral 3/1/2018    Procedure: TRANSPLANT LUNG RECIPIENT SINGLE X2;  Median Sternotomy, Extracorporeal Membrane Oxygenator, bilateral sequential lung transplant;  Surgeon: Toby Hernandez MD;  Location:  OR       FAMILY HISTORY:   Family History   Problem Relation Age of Onset     Hypertension Mother      Arthritis Mother      Pancreatic Cancer Father      Diabetes Father        SOCIAL HISTORY:   Social History     Tobacco Use     Smoking status: Never Smoker     Smokeless tobacco: Never Used   Substance Use Topics     Alcohol use: No     Alcohol/week: 1.0 standard drink     Types: 1 Glasses of wine per week       REVIEW OF SYSTEMS:  The comprehensive review of systems from the intake form was reviewed with the patient.  No fever, weight change or fatigue. No dry eyes. No oral ulcers, sore  throat or voice change. No palpitations, syncope, angina or edema.  No chest pain, excessive sleepiness, shortness of breath or hemoptysis.   No abdominal pain, nausea, vomiting, diarrhea or heartburn.  No skin rash. No focal weakness or numbness. No bleeding or lymphadenopathy. No rhinitis or hives.     Exam:  On physical examination the patient appears the stated age, is in no acute distress, affectThe is appropriate, and breathing is non-labored.  Vitals are documented in the EMR and have been reviewed:    LMP 06/07/2014 (Exact Date)   Data Unavailable  There is no height or weight on file to calculate BMI.    Rises from chair:   Gait:  Trendelenburg test:  Gains the exam table:      Left Knee  Clinical alignment: neutral   Effusion: large/maximum  Tenderness to palpation: medial and lateral joint line, modest   Extension:5  Flexion: 105  Collateral ligaments: intact  Cruciate ligaments: grossly intact   Moves joint rapidly through ROM without inhibition.     Distally, the circulatory, motor, and sensation exam is intact with 5/5 EHL, gastroc-soleus, and tibialis anterior.Unable to palpate dorsalis pedis or posterior tibialis pulse.  There are no sores on the feet, no bruising, and no lymphedema. Feet are cool and consistent with Raynauds. Reports that her sensation is normal     X-rays:     PACS Images     Show images for XR Knee Left 3 Views         Study Result    Narrative & Impression   3 views left knee radiographs 5/19/2022 10:15 AM     History: Knee pain      Comparison: None     Findings:     AP, lateral and patellofemoral views of the left knee were obtained.      No acute osseous abnormality. Moderate joint effusion. Diffuse soft  tissue mineralization about the knee. Severe medial and lateral  compartment joint space loss. No patellar tilt or subluxation.  Juxta-articular erosions involving the periphery of the femoral  condyles and lateral tibial plateau.                                                                       Impression:  1. Juxta-articular erosions with soft tissue calcification (likely  tophus) about the knee suspicious for gout arthropathy.  2. Severe medial and lateral compartment joint space loss.     MD Case QUINONEZ MD (Joe)

## 2022-05-19 NOTE — NURSING NOTE
Reason For Visit:   Chief Complaint   Patient presents with     Consult     Left knee pain. Was to have a TKA done 4/5, was cancelled one hour prior to surgery because the facility did not have in patient dialysis.       LMP 06/07/2014 (Exact Date)          Deena Hernandez ATC

## 2022-05-24 NOTE — PROGRESS NOTES
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 This is a pre op clearance for a bronch/BAL scheduled for later today.     1. Bilateral lung transplant: no new pulmonary complaints, essentially non mobile secondary to her significant left knee pain. Sating 99% on room air. DSA 9/15/21 negative. CXR reviewed by me with stable basilar and perihilar opacities. PFTs are stable at her baseline. No acute issues.   - Continue AZA 25 mg daily, continue tacrolimus (goal 8-10) and prednisone   - On Bactrim 3 times/week    2. Right main bronchial stenosis s/p ligation: with multiple bronchs in the OR, last included debulking/dilation of the RERE. Scheduled for routine surveillance bronch today and if everything is stable, she will be discharged from IP  - Continue albuterol and pulmicort nebs daily    3. Congestive hepatopathy with fibrosis: complicated by ascites secondary to portal hypertension. Chronic elevation of alk phos. Fluid optimization with dialysis. Recently seen by Dr. Denise.     4. H/o CMV viremia (D+/R+): VGCV ppx with steroid burst, completed 2/23. Last CMV 3/28 negative.  - CMV pending for today     5. H/o EBV viremia: last level negative on 3/3.  - EBV pending for today     6. Left-sided aspergillus empyema: noted 10/8/19. CT scan on 7/17/20 with increased mass-like density in LLL area s/p needle aspiration (8/20) with Aspergillus fumigatus on cultures s/p intrapleural bead placement (amphoterecin, 11/20). S/p chest tube drainage in May 2021. Has been stable. Likely with  be on posaconazole for life per ID. Last level of 1.1 on 2/10.   - Continue posaconazole  - Level pending for today    7. ESRD on iHD (since 10/2019):  HD: BP elevated today at 151/87. Continues now on iHD M/W/F, dry weight is currently 128 lbs.  Has been waiting to hear from Nephrology re: listing for renal transplant.  - Continue metoprolol    8. HCM: reviewed with the patient including normal WBC and platelets, low, but improved hgb of 11.2, normal Na/K, elevated BUN/Cr, stable cholesterol panel, TGs slightly elevated at 155. AIC of 5.2.     9. Osteoporosis: on DEXA from 5/19 with significant loss of bone in her lumbar spine and femur. Completed by steroid use and fact that her activity is severely limited by her knee pain.  - Vitamin D pending  - Continue supplementation  - Endocrine referral today    Chronic issues:  1. Paroxysmal AFib  2. Hypomagnesemia  3. Dermatomyositis with Raynauds    We discussed the risks and benefits of receiving EVUSHELD, as well as alternative treatments. The Emergency Use Administration (EUA) was provided to the patient for review and an opportunity for questions was provided. Patient wishes to proceed with EVUSHELD. She will come back and get the injection after her bronchoscopy today.     RTC: 4 months pending bronch  Annuals: ordered for next visit; defer walk test  Preventative: received her 3rd covid vaccine; colonoscopy 2022    Ame Chow PA-C  Pulmonary, Allergy, Critical Care and Sleep Medicine        Interval History:     Overall is feeling ok, lungs are fine, knee has been an issue. Sat Orthopedic last week and they would like her evaluated by Neurology for neuropathic joint. L>R knee, severely limited, using a walker at home, WC for longer distances. No fever or chills, no change in breathing although does no activity per above. Minimal cough, no wheeze, chest pains or palpitations. No nausea or bloating, no gas.           Review of Systems:   Please see HPI, otherwise  the complete 10 point ROS is negative.           Past Medical and Surgical History:     Past Medical History:   Diagnosis Date     Acute on chronic respiratory failure with hypoxia (H) 02/21/2018     Anisocoria      Antisynthetase syndrome (H) 2014     Anxiety      Aspergillus (H)      Aspergillus pneumonia (H) 11/20/2020     Benign essential hypertension      C. difficile colitis      Cytomegalovirus (CMV) viremia (H)      Dermatomyositis (H)      Dysplasia of cervix, low grade (ESTRADA 1)      EBV (Franklin-Barr virus) viremia      ESRD (end stage renal disease) on dialysis (H)      ILD (interstitial lung disease) (H)      Lung replaced by transplant (H)      Osteopenia      Paroxysmal atrial fibrillation (H)      Pneumocystis jiroveci pneumonia (H)      PONV (postoperative nausea and vomiting)      Post-menopause      Pulmonary hypertension (H)      Raynaud's disease      Seronegative rheumatoid arthritis (H)      Past Surgical History:   Procedure Laterality Date     BRONCHOSCOPY (RIGID OR FLEXIBLE), DIAGNOSTIC N/A 4/10/2018    Procedure: COMBINED BRONCHOSCOPY (RIGID OR FLEXIBLE), LAVAGE;;  Surgeon: Mariposa Donohue MD;  Location: [facility]     BRONCHOSCOPY (RIGID OR FLEXIBLE), DIAGNOSTIC N/A 12/23/2020    Procedure: BRONCHOSCOPY, WITH BRONCHOALVEOLAR LAVAGE;  Surgeon: Uri Bass MD;  Location: [facility]     BRONCHOSCOPY (RIGID OR FLEXIBLE), DILATE BRONCHUS / TRACHEA N/A 10/11/2018    Procedure: BRONCHOSCOPY (RIGID OR FLEXIBLE), DILATE BRONCHUS / TRACHEA;  Flexible And Rigid Bronchoscopy and Dilation;  Surgeon: Wilber Lin MD;  [facility]     BRONCHOSCOPY FLEXIBLE N/A 3/13/2018    Procedure: BRONCHOSCOPY FLEXIBLE;  Flexible Bronchoscopy ;  Surgeon: Gissell Sanchez MD;  [facility]: [facility]     BRONCHOSCOPY FLEXIBLE N/A 5/9/2018    Procedure: BRONCHOSCOPY FLEXIBLE;;  Surgeon: Wilber Lin MD;  [facility]: [facility]     BRONCHOSCOPY FLEXIBLE AND RIGID N/A 9/10/2018    Procedure: BRONCHOSCOPY  FLEXIBLE AND RIGID;  Flexible and Rigid Bronchoscopy with Balloon Dilation, tissue debulking with cryo, and Right mainstem bronchus stent placement;  Surgeon: Wilber Lin MD;  [facility]     BRONCHOSCOPY RIGID N/A 6/6/2018    Procedure: BRONCHOSCOPY RIGID;;  Surgeon: Lopez Macias MD;  [facility]: [facility]     BRONCHOSCOPY, DILATE BRONCHUS, STENT BRONCHUS, COMBINED N/A 6/11/2018    Procedure: COMBINED BRONCHOSCOPY, DILATE BRONCHUS, STENT BRONCHUS;  Flexible Bronchoscopy, Balloon Dilation, Bronchial Washings;  Surgeon: Wilber Lin MD;  [facility]     BRONCHOSCOPY, DILATE BRONCHUS, STENT BRONCHUS, COMBINED Right 7/10/2018    Procedure: COMBINED BRONCHOSCOPY, DILATE BRONCHUS, STENT BRONCHUS;  Flexible Bronchoscopy, Balloon Dilation, Bronchial Washings  ;  Surgeon: Wilber Lin MD;  [facility]     BRONCHOSCOPY, DILATE BRONCHUS, STENT BRONCHUS, COMBINED N/A 8/2/2018    Procedure: COMBINED BRONCHOSCOPY, DILATE BRONCHUS, STENT BRONCHUS;  Flexible Bronchoscopy, Bronchial Washings, Balloon Dilation;  Surgeon: Wilber Lin MD;  [facility]     BRONCHOSCOPY, DILATE BRONCHUS, STENT BRONCHUS, COMBINED N/A 8/20/2018    Procedure: COMBINED BRONCHOSCOPY, DILATE BRONCHUS, STENT BRONCHUS;  Flexible Bronchoscopy, Balloon Dilation;  Surgeon: Wilber Lin MD;  [facility]     BRONCHOSCOPY, DILATE BRONCHUS, STENT BRONCHUS, COMBINED N/A 10/29/2018    Procedure: Flexible Bronchoscopy, Balloon Dilation, Stent Revision, Airway Examination And Therapeutic Suctioning, Cyro Tumor Debulking;  Surgeon: Wilber Lin MD;  [facility]     BRONCHOSCOPY, DILATE BRONCHUS, STENT BRONCHUS, COMBINED N/A 11/20/2018    Procedure: Rigid Bronchoscopy, Stent Removal and dilitation;  Surgeon: Wilber Lin MD;  [facility]     BRONCHOSCOPY, DILATE BRONCHUS, STENT BRONCHUS, COMBINED N/A 12/14/2018    Procedure: Flexible And Rigid Bronchoscopy, Balloon  Dilation, Bronchial Washing;  Surgeon: Wilber Lin MD;  [facility]     BRONCHOSCOPY, DILATE BRONCHUS, STENT BRONCHUS, COMBINED N/A 1/17/2019    Procedure: Flexible And Rigid Bronchoscopy, Balloon Dilation.;  Surgeon: Wilber Lin MD;  [facility]     BRONCHOSCOPY, DILATE BRONCHUS, STENT BRONCHUS, COMBINED N/A 3/7/2019    Procedure: Flexible and Rigid Bronchoscopy, Bronchial Washing, Balloon Dilation;  Surgeon: Wilber Lin MD;  [facility]     BRONCHOSCOPY, DILATE BRONCHUS, STENT BRONCHUS, COMBINED N/A 6/6/2019    Procedure: Rigid and Flexible Bronchoscopy, Balloon Dilation;  Surgeon: Wilber Lin MD;  [facility]     BRONCHOSCOPY, DILATE BRONCHUS, STENT BRONCHUS, COMBINED N/A 10/11/2019    Procedure: Flexible and Rigid Bronchoscopy, Balloon Dilation, Bronchoalveolar Lagave;  Surgeon: Wilber Lin MD;  [facility]     BRONCHOSCOPY, DILATE BRONCHUS, STENT BRONCHUS, COMBINED N/A 2/19/2021    Procedure: BRONCHOSCOPY, flexible, airway dilation, and bronchial wash;  Surgeon: Wilber Lin MD;  [facility]     BRONCHOSCOPY, DILATE BRONCHUS, STENT BRONCHUS, COMBINED N/A 4/9/2021    Procedure: BRONCHOSCOPY, flexible and rigid, Airway suctioning;  Surgeon: Mati Norris MD;  [facility]     CV RIGHT HEART CATH MEASUREMENTS RECORDED N/A 3/10/2020    Procedure: CV RIGHT HEART CATH;  Surgeon: Wai Garcia MD;  [facility]     ENT SURGERY      tonsillectomy as a child     ESOPHAGOSCOPY, GASTROSCOPY, DUODENOSCOPY (EGD), COMBINED N/A 10/29/2018    Procedure: COMBINED ESOPHAGOSCOPY, GASTROSCOPY, DUODENOSCOPY (EGD) with biopsies and polypectomy;  Surgeon: Chente Bloom MD;  [facility]     INSERT EXTRACORPORAL MEMBRANE OXYGENATOR ADULT (OUTSIDE OR) N/A 2/27/2018    Procedure: INSERT EXTRACORPORAL MEMBRANE OXYGENATOR ADULT (OUTSIDE OR);  INSERT EXTRACORPORAL MEMBRANE OXYGENATOR ADULT (OUTSIDE OR)  ;  Surgeon: Toby Hernandez MD;  [facility]     IR CVC TUNNEL PLACEMENT > 5 YRS OF AGE  10/25/2019     IR DIALYSIS FISTULOGRAM LEFT  3/2/2021     IR DIALYSIS MECH THROMB, PTA  3/2/2021     IR FLUORO 0-1 HOUR  5/7/2021     IR GASTRO JEJUNOSTOMY TUBE PLACEMENT  2/16/2021     IR PARACENTESIS  1/8/2020     IR THORACENTESIS  9/13/2019     IR TRANSCATHETER BIOPSY  1/8/2020     LASER CO2 BRONCHOSCOPY N/A 4/30/2021    Procedure: Flexible and Rigid Bronchoscopy and Tissue Debulking with CO2 Laser Assistance;  Surgeon: Mati Norris MD;  [facility]     LASER CO2 BRONCHOSCOPY N/A 6/11/2021    Procedure: BRONCHOSCOPY, Flexible and Rigid Bronchoscopy, Tissue Debulking with cryo Assistance, airway dilation,;  Surgeon: Mati Norris MD;  [facility]     LASER CO2 BRONCHOSCOPY N/A 9/16/2021    Procedure: BRONCHOSCOPY, flexible and rigid, CO2 Laser Debulking;  Surgeon: Mati Norris MD;  [facility]     LASER CO2 BRONCHOSCOPY N/A 2/11/2022    Procedure: flexible, rigid bronchoscopy, tissue debulking, airway dilation, co2 laser, bronchoalveolar lavage;  Surgeon: Juana Baugh MD;  [facility]     no prior surgery       REMOVE EXTRACORPORAL MEMBRANE OXYGENATOR ADULT N/A 3/3/2018    Procedure: REMOVE EXTRACORPORAL MEMBRANE OXYGENATOR ADULT;  Removal of Right Femoral Venous and Right Axillary Arterial Extracorporeal Membrane Oxygenator;  Surgeon: Toby Hernandez MD;  [facility]     TRANSPLANT LUNG RECIPIENT SINGLE X2 Bilateral 3/1/2018    Procedure: TRANSPLANT LUNG RECIPIENT SINGLE X2;  Median Sternotomy, Extracorporeal Membrane Oxygenator, bilateral sequential lung transplant;  Surgeon: Toby Hernandez MD;  [facility]           Family History:     Family History   Problem Relation Age of Onset     Hypertension Mother      Arthritis Mother      Pancreatic Cancer Father      Diabetes Father             Social History:     Social History     Socioeconomic History      Marital status:      Spouse name: Not on file     Number of children: Not on file     Years of education: Not on file     Highest education level: Not on file   Occupational History     Not on file   Tobacco Use     Smoking status: Never Smoker     Smokeless tobacco: Never Used   Substance and Sexual Activity     Alcohol use: No     Alcohol/week: 1.0 standard drink     Types: 1 Glasses of wine per week     Drug use: No     Sexual activity: Not on file   Other Topics Concern     Parent/sibling w/ CABG, MI or angioplasty before 65F 55M? No   Social History Narrative    3/6/2019 - Lives with . Has three daughters. Four grandchildren (two ). No pets. Travelled previously to [place]. Has visited [place] several times.      Social Determinants of Health     Financial Resource Strain: Not on file   Food Insecurity: Not on file   Transportation Needs: Not on file   Physical Activity: Not on file   Stress: Not on file   Social Connections: Not on file   Intimate Partner Violence: Not on file   Housing Stability: Not on file            Medications:     Current Outpatient Medications   Medication     calcium acetate (PHOSLO) 667 MG CAPS capsule     acetaminophen (TYLENOL) 325 MG tablet     albuterol (PROVENTIL) (2.5 MG/3ML) 0.083% neb solution     azaTHIOprine (IMURAN) 50 MG tablet     budesonide (PULMICORT) 0.5 MG/2ML neb solution     Magnesium Cl-Calcium Carbonate (SLOW-MAG) 71.5-119 MG TBEC     metoprolol tartrate (LOPRESSOR) 25 MG tablet     multivitamin RENAL (RENAVITE RX/NEPHROVITE) 1 MG tablet     pantoprazole (PROTONIX) 40 MG EC tablet     posaconazole (NOXAFIL) 100 MG EC tablet     predniSONE (DELTASONE) 2.5 MG tablet     predniSONE (DELTASONE) 5 MG tablet     sulfamethoxazole-trimethoprim (BACTRIM) 400-80 MG tablet     tacrolimus (GENERIC EQUIVALENT) 0.5 MG capsule     tacrolimus (GENERIC EQUIVALENT) 1 MG capsule     No current facility-administered medications for this visit.         "    Physical Exam:   BP (!) 151/87 (BP Location: Right arm)   Pulse 79   Ht 1.626 m (5' 4\")   Wt 58.5 kg (129 lb)   LMP 06/07/2014 (Exact Date)   SpO2 99%   BMI 22.14 kg/m      GENERAL: alert, NAD  HEENT: NCAT, EOMI, no scleral icterus, oral mucosa moist and without lesions  Neck: no cervical or supraclavicular adenopathy  Lungs: moderate airflow, scattered basilar crackles with expiratory wheezing on the right  CV: RRR, S1S2, no murmurs noted  Abdomen: normoactive BS, soft, non tender   Lymph:trace BLE edema  Neuro: AAO X 3, CN 2-12 grossly intact  Psychiatric: normal affect, good eye contact  Skin: no rash, jaundice or lesions on limited exam  MSK: left knee in brace         Data:   All laboratory and imaging data reviewed.      Recent Results (from the past 168 hour(s))   COVID-19 Virus (Coronavirus) by PCR (External Result)    Collection Time: 05/23/22 10:13 AM   Result Value Ref Range    COVID-19 Virus by PCR (External Result) Undetected Undetected   COVID-19 VIRUS (CORONAVIRUS) BY PCR (EXTERNAL RESULT)    Collection Time: 05/23/22 10:13 AM   Result Value Ref Range    Specimen Source Nasopharynx     COVID-19 Virus PCR to [facility] - Result Undetected Undetected    SARS-CoV-2 PCR Comment SEE COMMENTS    General PFT Lab (Please always keep checked)    Collection Time: 05/26/22  6:01 AM   Result Value Ref Range    FVC-Pred 3.20 L    FVC-Pre 1.56 L    FVC-%Pred-Pre 48 %    FEV1-Pre 1.25 L    FEV1-%Pred-Pre 49 %    FEV1FVC-Pred 79 %    FEV1FVC-Pre 80 %    FEFMax-Pred 6.35 L/sec    FEFMax-Pre 3.20 L/sec    FEFMax-%Pred-Pre 50 %    FEF2575-Pred 2.32 L/sec    FEF2575-Pre 1.11 L/sec    MRY9645-%Pred-Pre 48 %    ExpTime-Pre 6.68 sec    FIFMax-Pre 2.77 L/sec    VC-Pred 3.29 L    VC-Pre 1.46 L    VC-%Pred-Pre 44 %    IC-Pred 2.32 L    IC-Pre 1.12 L    IC-%Pred-Pre 48 %    ERV-Pred 0.97 L    ERV-Pre 0.34 L    ERV-%Pred-Pre 35 %    FEV1FEV6-Pred 81 %    FEV1FEV6-Pre 80 %    FRCPleth-Pred 2.70 L    FRCPleth-Pre 1.97 L    " FRCPleth-%Pred-Pre 72 %    RVPleth-Pred 1.89 L    RVPleth-Pre 1.62 L    RVPleth-%Pred-Pre 86 %    TLCPleth-Pred 4.94 L    TLCPleth-Pre 3.08 L    TLCPleth-%Pred-Pre 62 %    DLCOunc-Pred 20.31 ml/min/mmHg    DLCOunc-Pre 10.06 ml/min/mmHg    DLCOunc-%Pred-Pre 49 %    VA-Pre 2.07 L    VA-%Pred-Pre 43 %    FEV1SVC-Pred 77 %    FEV1SVC-Pre 86 %   Magnesium    Collection Time: 05/26/22  7:50 AM   Result Value Ref Range    Magnesium 1.8 1.6 - 2.3 mg/dL   Basic metabolic panel    Collection Time: 05/26/22  7:50 AM   Result Value Ref Range    Sodium 137 133 - 144 mmol/L    Potassium 3.5 3.4 - 5.3 mmol/L    Chloride 96 94 - 109 mmol/L    Carbon Dioxide (CO2) 32 20 - 32 mmol/L    Anion Gap 9 3 - 14 mmol/L    Urea Nitrogen 42 (H) 7 - 30 mg/dL    Creatinine 3.98 (H) 0.52 - 1.04 mg/dL    Calcium 9.5 8.5 - 10.1 mg/dL    Glucose 110 (H) 70 - 99 mg/dL    GFR Estimate 12 (L) >60 mL/min/1.73m2   Hemoglobin A1c    Collection Time: 05/26/22  7:50 AM   Result Value Ref Range    Hemoglobin A1C 5.2 0.0 - 5.6 %   Lipid Profile    Collection Time: 05/26/22  7:50 AM   Result Value Ref Range    Cholesterol 171 <200 mg/dL    Triglycerides 155 (H) <150 mg/dL    Direct Measure HDL 69 >=50 mg/dL    LDL Cholesterol Calculated 71 <=100 mg/dL    Non HDL Cholesterol 102 <130 mg/dL    Patient Fasting > 8hrs? Unknown    INR    Collection Time: 05/26/22  7:50 AM   Result Value Ref Range    INR 0.96 0.85 - 1.15   CBC with platelets and differential    Collection Time: 05/26/22  7:50 AM   Result Value Ref Range    WBC Count 5.6 4.0 - 11.0 10e3/uL    RBC Count 3.49 (L) 3.80 - 5.20 10e6/uL    Hemoglobin 11.2 (L) 11.7 - 15.7 g/dL    Hematocrit 36.2 35.0 - 47.0 %     (H) 78 - 100 fL    MCH 32.1 26.5 - 33.0 pg    MCHC 30.9 (L) 31.5 - 36.5 g/dL    RDW 16.0 (H) 10.0 - 15.0 %    Platelet Count 199 150 - 450 10e3/uL    % Neutrophils 64 %    % Lymphocytes 18 %    % Monocytes 15 %    % Eosinophils 1 %    % Basophils 1 %    % Immature Granulocytes 1 %    NRBCs  per 100 WBC 0 <1 /100    Absolute Neutrophils 3.7 1.6 - 8.3 10e3/uL    Absolute Lymphocytes 1.0 0.8 - 5.3 10e3/uL    Absolute Monocytes 0.8 0.0 - 1.3 10e3/uL    Absolute Eosinophils 0.1 0.0 - 0.7 10e3/uL    Absolute Basophils 0.0 0.0 - 0.2 10e3/uL    Absolute Immature Granulocytes 0.0 <=0.4 10e3/uL    Absolute NRBCs 0.0 10e3/uL     PFT interpretation:  Maneuver: valid and met ATS guidelines  Normal ratio with decreased FEV1 and FVC  Compared to prior: FEV1 of 1.25 is stable from prior, FVC down 140 cc  Decrease in FVC is likely related to restriction; lung volumes would be necessary to determine

## 2022-05-26 ENCOUNTER — OFFICE VISIT (OUTPATIENT)
Dept: PULMONOLOGY | Facility: CLINIC | Age: 62
End: 2022-05-26
Attending: PHYSICIAN ASSISTANT
Payer: MEDICARE

## 2022-05-26 ENCOUNTER — LAB (OUTPATIENT)
Dept: LAB | Facility: CLINIC | Age: 62
End: 2022-05-26
Payer: MEDICARE

## 2022-05-26 ENCOUNTER — HOSPITAL ENCOUNTER (OUTPATIENT)
Facility: CLINIC | Age: 62
Discharge: HOME | End: 2022-05-26
Attending: INTERNAL MEDICINE | Admitting: INTERNAL MEDICINE
Payer: MEDICARE

## 2022-05-26 VITALS
BODY MASS INDEX: 22.73 KG/M2 | OXYGEN SATURATION: 98 % | HEART RATE: 74 BPM | DIASTOLIC BLOOD PRESSURE: 76 MMHG | WEIGHT: 128.31 LBS | HEIGHT: 63 IN | RESPIRATION RATE: 18 BRPM | SYSTOLIC BLOOD PRESSURE: 132 MMHG | TEMPERATURE: 98.4 F

## 2022-05-26 VITALS
DIASTOLIC BLOOD PRESSURE: 87 MMHG | BODY MASS INDEX: 22.02 KG/M2 | WEIGHT: 129 LBS | OXYGEN SATURATION: 99 % | HEART RATE: 79 BPM | HEIGHT: 64 IN | SYSTOLIC BLOOD PRESSURE: 151 MMHG

## 2022-05-26 DIAGNOSIS — Z94.2 LUNG REPLACED BY TRANSPLANT (H): ICD-10-CM

## 2022-05-26 DIAGNOSIS — Z94.2 S/P LUNG TRANSPLANT (H): Primary | ICD-10-CM

## 2022-05-26 DIAGNOSIS — D84.9 IMMUNOSUPPRESSED STATUS (H): ICD-10-CM

## 2022-05-26 DIAGNOSIS — E83.42 HYPOMAGNESEMIA: ICD-10-CM

## 2022-05-26 DIAGNOSIS — R79.9 ABNORMAL FINDING OF BLOOD CHEMISTRY, UNSPECIFIED: ICD-10-CM

## 2022-05-26 DIAGNOSIS — Z94.2 LUNG REPLACED BY TRANSPLANT (H): Primary | ICD-10-CM

## 2022-05-26 DIAGNOSIS — Z79.52 LONG TERM (CURRENT) USE OF SYSTEMIC STEROIDS: ICD-10-CM

## 2022-05-26 DIAGNOSIS — M81.8 OTHER OSTEOPOROSIS WITHOUT CURRENT PATHOLOGICAL FRACTURE: ICD-10-CM

## 2022-05-26 DIAGNOSIS — Z94.2 S/P LUNG TRANSPLANT (H): ICD-10-CM

## 2022-05-26 LAB
ANION GAP SERPL CALCULATED.3IONS-SCNC: 9 MMOL/L (ref 3–14)
BASOPHILS # BLD AUTO: 0 10E3/UL (ref 0–0.2)
BASOPHILS NFR BLD AUTO: 1 %
BRONCHOSCOPY: NORMAL
BUN SERPL-MCNC: 42 MG/DL (ref 7–30)
CALCIUM SERPL-MCNC: 9.5 MG/DL (ref 8.5–10.1)
CHLORIDE BLD-SCNC: 96 MMOL/L (ref 94–109)
CHOLEST SERPL-MCNC: 171 MG/DL
CMV DNA SPEC NAA+PROBE-ACNC: <137 IU/ML
CMV DNA SPEC NAA+PROBE-LOG#: <2.1 {LOG_COPIES}/ML
CO2 SERPL-SCNC: 32 MMOL/L (ref 20–32)
CREAT SERPL-MCNC: 3.98 MG/DL (ref 0.52–1.04)
DEPRECATED CALCIDIOL+CALCIFEROL SERPL-MC: 40 UG/L (ref 20–75)
DLCOUNC-%PRED-PRE: 49 %
DLCOUNC-PRE: 10.06 ML/MIN/MMHG
DLCOUNC-PRED: 20.31 ML/MIN/MMHG
EOSINOPHIL # BLD AUTO: 0.1 10E3/UL (ref 0–0.7)
EOSINOPHIL NFR BLD AUTO: 1 %
ERV-%PRED-PRE: 35 %
ERV-PRE: 0.34 L
ERV-PRED: 0.97 L
ERYTHROCYTE [DISTWIDTH] IN BLOOD BY AUTOMATED COUNT: 16 % (ref 10–15)
EXPTIME-PRE: 6.68 SEC
FASTING STATUS PATIENT QL REPORTED: ABNORMAL
FEF2575-%PRED-PRE: 48 %
FEF2575-PRE: 1.11 L/SEC
FEF2575-PRED: 2.32 L/SEC
FEFMAX-%PRED-PRE: 50 %
FEFMAX-PRE: 3.2 L/SEC
FEFMAX-PRED: 6.35 L/SEC
FEV1-%PRED-PRE: 49 %
FEV1-PRE: 1.25 L
FEV1FEV6-PRE: 80 %
FEV1FEV6-PRED: 81 %
FEV1FVC-PRE: 80 %
FEV1FVC-PRED: 79 %
FEV1SVC-PRE: 86 %
FEV1SVC-PRED: 77 %
FIFMAX-PRE: 2.77 L/SEC
FRCPLETH-%PRED-PRE: 72 %
FRCPLETH-PRE: 1.97 L
FRCPLETH-PRED: 2.7 L
FVC-%PRED-PRE: 48 %
FVC-PRE: 1.56 L
FVC-PRED: 3.2 L
GFR SERPL CREATININE-BSD FRML MDRD: 12 ML/MIN/1.73M2
GLUCOSE BLD-MCNC: 110 MG/DL (ref 70–99)
HBA1C MFR BLD: 5.2 % (ref 0–5.6)
HCT VFR BLD AUTO: 36.2 % (ref 35–47)
HDLC SERPL-MCNC: 69 MG/DL
HGB BLD-MCNC: 11.2 G/DL (ref 11.7–15.7)
HOLD SPECIMEN: NORMAL
HOLD SPECIMEN: NORMAL
IC-%PRED-PRE: 48 %
IC-PRE: 1.12 L
IC-PRED: 2.32 L
IMM GRANULOCYTES # BLD: 0 10E3/UL
IMM GRANULOCYTES NFR BLD: 1 %
INR PPP: 0.96 (ref 0.85–1.15)
LDLC SERPL CALC-MCNC: 71 MG/DL
LYMPHOCYTES # BLD AUTO: 1 10E3/UL (ref 0.8–5.3)
LYMPHOCYTES NFR BLD AUTO: 18 %
MAGNESIUM SERPL-MCNC: 1.8 MG/DL (ref 1.6–2.3)
MCH RBC QN AUTO: 32.1 PG (ref 26.5–33)
MCHC RBC AUTO-ENTMCNC: 30.9 G/DL (ref 31.5–36.5)
MCV RBC AUTO: 104 FL (ref 78–100)
MONOCYTES # BLD AUTO: 0.8 10E3/UL (ref 0–1.3)
MONOCYTES NFR BLD AUTO: 15 %
NEUTROPHILS # BLD AUTO: 3.7 10E3/UL (ref 1.6–8.3)
NEUTROPHILS NFR BLD AUTO: 64 %
NONHDLC SERPL-MCNC: 102 MG/DL
NRBC # BLD AUTO: 0 10E3/UL
NRBC BLD AUTO-RTO: 0 /100
PLATELET # BLD AUTO: 199 10E3/UL (ref 150–450)
POSACONAZOLE SERPL-MCNC: 2 UG/ML (ref 0.7–5)
POTASSIUM BLD-SCNC: 3.5 MMOL/L (ref 3.4–5.3)
RBC # BLD AUTO: 3.49 10E6/UL (ref 3.8–5.2)
RVPLETH-%PRED-PRE: 86 %
RVPLETH-PRE: 1.62 L
RVPLETH-PRED: 1.89 L
SODIUM SERPL-SCNC: 137 MMOL/L (ref 133–144)
TACROLIMUS BLD-MCNC: 9.1 UG/L (ref 5–15)
TLCPLETH-%PRED-PRE: 62 %
TLCPLETH-PRE: 3.08 L
TLCPLETH-PRED: 4.94 L
TME LAST DOSE: NORMAL H
TME LAST DOSE: NORMAL H
TRIGL SERPL-MCNC: 155 MG/DL
VA-%PRED-PRE: 43 %
VA-PRE: 2.07 L
VC-%PRED-PRE: 44 %
VC-PRE: 1.46 L
VC-PRED: 3.29 L
WBC # BLD AUTO: 5.6 10E3/UL (ref 4–11)

## 2022-05-26 PROCEDURE — 36415 COLL VENOUS BLD VENIPUNCTURE: CPT | Performed by: PATHOLOGY

## 2022-05-26 PROCEDURE — 99152 MOD SED SAME PHYS/QHP 5/>YRS: CPT | Mod: GC | Performed by: INTERNAL MEDICINE

## 2022-05-26 PROCEDURE — M0220 HC INJECTION TIXAGEVIMAB & CILGAVIMAB (EVUSHELD): HCPCS | Performed by: PHYSICIAN ASSISTANT

## 2022-05-26 PROCEDURE — 31622 DX BRONCHOSCOPE/WASH: CPT | Performed by: INTERNAL MEDICINE

## 2022-05-26 PROCEDURE — 80187 DRUG ASSAY POSACONAZOLE: CPT | Performed by: PHYSICIAN ASSISTANT

## 2022-05-26 PROCEDURE — 86833 HLA CLASS II HIGH DEFIN QUAL: CPT | Performed by: PHYSICIAN ASSISTANT

## 2022-05-26 PROCEDURE — 99214 OFFICE O/P EST MOD 30 MIN: CPT | Mod: 25 | Performed by: PHYSICIAN ASSISTANT

## 2022-05-26 PROCEDURE — 94375 RESPIRATORY FLOW VOLUME LOOP: CPT | Performed by: PHYSICIAN ASSISTANT

## 2022-05-26 PROCEDURE — 83036 HEMOGLOBIN GLYCOSYLATED A1C: CPT | Performed by: PATHOLOGY

## 2022-05-26 PROCEDURE — 94729 DIFFUSING CAPACITY: CPT | Performed by: PHYSICIAN ASSISTANT

## 2022-05-26 PROCEDURE — 85025 COMPLETE CBC W/AUTO DIFF WBC: CPT | Performed by: PATHOLOGY

## 2022-05-26 PROCEDURE — G0500 MOD SEDAT ENDO SERVICE >5YRS: HCPCS | Performed by: INTERNAL MEDICINE

## 2022-05-26 PROCEDURE — 83735 ASSAY OF MAGNESIUM: CPT | Performed by: PATHOLOGY

## 2022-05-26 PROCEDURE — 80048 BASIC METABOLIC PNL TOTAL CA: CPT | Performed by: PATHOLOGY

## 2022-05-26 PROCEDURE — 31624 DX BRONCHOSCOPE/LAVAGE: CPT | Performed by: INTERNAL MEDICINE

## 2022-05-26 PROCEDURE — 87799 DETECT AGENT NOS DNA QUANT: CPT | Performed by: PHYSICIAN ASSISTANT

## 2022-05-26 PROCEDURE — 82306 VITAMIN D 25 HYDROXY: CPT | Performed by: PHYSICIAN ASSISTANT

## 2022-05-26 PROCEDURE — 999N000248 HC STATISTIC IV INSERT WITH US BY RN

## 2022-05-26 PROCEDURE — 31622 DX BRONCHOSCOPE/WASH: CPT | Mod: GC | Performed by: INTERNAL MEDICINE

## 2022-05-26 PROCEDURE — 80061 LIPID PANEL: CPT | Performed by: PATHOLOGY

## 2022-05-26 PROCEDURE — 250N000011 HC RX IP 250 OP 636: Performed by: INTERNAL MEDICINE

## 2022-05-26 PROCEDURE — 85610 PROTHROMBIN TIME: CPT | Performed by: PATHOLOGY

## 2022-05-26 PROCEDURE — 96372 THER/PROPH/DIAG INJ SC/IM: CPT | Performed by: PHYSICIAN ASSISTANT

## 2022-05-26 PROCEDURE — 86832 HLA CLASS I HIGH DEFIN QUAL: CPT | Performed by: PHYSICIAN ASSISTANT

## 2022-05-26 PROCEDURE — G0463 HOSPITAL OUTPT CLINIC VISIT: HCPCS | Mod: 25

## 2022-05-26 PROCEDURE — 94726 PLETHYSMOGRAPHY LUNG VOLUMES: CPT | Performed by: PHYSICIAN ASSISTANT

## 2022-05-26 PROCEDURE — 80197 ASSAY OF TACROLIMUS: CPT | Performed by: PHYSICIAN ASSISTANT

## 2022-05-26 PROCEDURE — 250N000011 HC RX IP 250 OP 636: Performed by: PHYSICIAN ASSISTANT

## 2022-05-26 RX ORDER — NALOXONE HYDROCHLORIDE 0.4 MG/ML
0.2 INJECTION, SOLUTION INTRAMUSCULAR; INTRAVENOUS; SUBCUTANEOUS
Status: DISCONTINUED | OUTPATIENT
Start: 2022-05-26 | End: 2022-05-26 | Stop reason: HOSPADM

## 2022-05-26 RX ORDER — FENTANYL CITRATE 50 UG/ML
INJECTION, SOLUTION INTRAMUSCULAR; INTRAVENOUS PRN
Status: COMPLETED | OUTPATIENT
Start: 2022-05-26 | End: 2022-05-26

## 2022-05-26 RX ORDER — NALOXONE HYDROCHLORIDE 0.4 MG/ML
0.4 INJECTION, SOLUTION INTRAMUSCULAR; INTRAVENOUS; SUBCUTANEOUS
Status: DISCONTINUED | OUTPATIENT
Start: 2022-05-26 | End: 2022-05-26 | Stop reason: HOSPADM

## 2022-05-26 RX ORDER — FLUMAZENIL 0.1 MG/ML
0.2 INJECTION, SOLUTION INTRAVENOUS
Status: DISCONTINUED | OUTPATIENT
Start: 2022-05-26 | End: 2022-05-26 | Stop reason: HOSPADM

## 2022-05-26 RX ORDER — CALCIUM ACETATE 667 MG/1
667 CAPSULE ORAL
Status: ON HOLD | COMMUNITY
Start: 2022-05-26 | End: 2023-01-01

## 2022-05-26 RX ORDER — ONDANSETRON 4 MG/1
4 TABLET, ORALLY DISINTEGRATING ORAL EVERY 6 HOURS PRN
Status: DISCONTINUED | OUTPATIENT
Start: 2022-05-26 | End: 2022-05-26 | Stop reason: HOSPADM

## 2022-05-26 RX ORDER — ONDANSETRON 2 MG/ML
4 INJECTION INTRAMUSCULAR; INTRAVENOUS EVERY 6 HOURS PRN
Status: DISCONTINUED | OUTPATIENT
Start: 2022-05-26 | End: 2022-05-26 | Stop reason: HOSPADM

## 2022-05-26 RX ADMIN — MIDAZOLAM 0.5 MG: 1 INJECTION INTRAMUSCULAR; INTRAVENOUS at 11:03

## 2022-05-26 RX ADMIN — MIDAZOLAM 1 MG: 1 INJECTION INTRAMUSCULAR; INTRAVENOUS at 11:00

## 2022-05-26 RX ADMIN — FENTANYL CITRATE 25 MCG: 50 INJECTION, SOLUTION INTRAMUSCULAR; INTRAVENOUS at 11:03

## 2022-05-26 RX ADMIN — MIDAZOLAM 0.5 MG: 1 INJECTION INTRAMUSCULAR; INTRAVENOUS at 11:08

## 2022-05-26 RX ADMIN — FENTANYL CITRATE 50 MCG: 50 INJECTION, SOLUTION INTRAMUSCULAR; INTRAVENOUS at 11:00

## 2022-05-26 RX ADMIN — AZD7442 6 ML: KIT at 12:37

## 2022-05-26 ASSESSMENT — PAIN SCALES - GENERAL: PAINLEVEL: NO PAIN (0)

## 2022-05-26 NOTE — LETTER
5/26/2022         RE: Kecia Blue  [address] [place] [address]        Dear Colleague,    Thank you for referring your patient, Kecia Blue, to the [facility] [place]. Please see a copy of my visit note below.    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 This is a pre op clearance for a bronch/BAL scheduled for later today.     1. Bilateral lung transplant: no new pulmonary complaints, essentially non mobile secondary to her significant left knee pain. Sating 99% on room air. DSA 9/15/21 negative. CXR reviewed by me with stable basilar and perihilar opacities. PFTs are stable at her baseline. No acute issues.   - Continue AZA 25 mg daily, continue tacrolimus (goal 8-10) and prednisone   - On Bactrim 3 times/week    2. Right main bronchial stenosis s/p ligation: with multiple bronchs in the OR, last included debulking/dilation of the RERE. Scheduled for routine surveillance bronch today and if everything is stable, she will be discharged from IP  - Continue albuterol and pulmicort nebs daily    3. Congestive hepatopathy with fibrosis: complicated by ascites secondary to portal hypertension. Chronic elevation of alk phos. Fluid optimization with dialysis. Recently seen by Dr. Denise.     4. H/o CMV viremia (D+/R+): VGCV ppx with steroid burst, completed 2/23. Last CMV 3/28 negative.  - CMV pending for today     5. H/o EBV viremia: last level negative on 3/3.  - EBV pending for today     6.  - BG under fair control  - A1c 4.1  - Hold home Metformin  - Accu checks ACHS with SSI prn  - Monitor     Left-sided aspergillus empyema: noted 10/8/19. CT scan on 7/17/20 with increased mass-like density in LLL area s/p needle aspiration (8/20) with Aspergillus fumigatus on cultures s/p intrapleural bead placement (amphoterecin, 11/20). S/p chest tube drainage in May 2021. Has been stable. Likely with be on posaconazole for life per ID. Last level of 1.1 on 2/10.   - Continue posaconazole  - Level pending for today    7. ESRD on iHD (since 10/2019):  HD: BP elevated today at 151/87. Continues now on iHD M/W/F, dry weight is currently 128 lbs.  Has been waiting to hear from Nephrology re: listing for renal transplant.  - Continue metoprolol    8. HCM: reviewed with the patient including normal WBC and platelets, low, but improved hgb of 11.2, normal Na/K, elevated BUN/Cr, stable cholesterol panel, TGs slightly elevated at 155. AIC of 5.2.     9. Osteoporosis: on DEXA from 5/19 with significant loss of bone in her lumbar spine and femur. Completed by steroid use and fact that her activity is severely limited by her knee pain.  - Vitamin D pending  - Continue supplementation  - Endocrine referral today    Chronic issues:  1. Paroxysmal AFib  2. Hypomagnesemia  3. Dermatomyositis with Raynauds    We discussed the risks and benefits of receiving EVUSHELD, as well as alternative treatments. The Emergency Use Administration (EUA) was provided to the patient for review and an opportunity for questions was provided. Patient wishes to proceed with EVUSHELD. She will come back and get the injection after her bronchoscopy today.     RTC: 4 months pending bronch  Annuals: ordered for next visit; defer walk test  Preventative: received her 3rd covid vaccine; colonoscopy 2022    Ame Chow PA-C  Pulmonary, Allergy, Critical Care and Sleep Medicine        Interval History:     Overall is feeling ok, lungs are fine, knee has been an issue. Sat Orthopedic last week and they would like her evaluated by Neurology for neuropathic joint.  L>R knee, severely limited, using a walker at home, WC for longer distances. No fever or chills, no change in breathing although does no activity per above. Minimal cough, no wheeze, chest pains or palpitations. No nausea or bloating, no gas.           Review of Systems:   Please see HPI, otherwise the complete 10 point ROS is negative.           Past Medical and Surgical History:     Past Medical History:   Diagnosis Date     Acute on chronic respiratory failure with hypoxia (H) 02/21/2018     Anisocoria      Antisynthetase syndrome (H) 2014     Anxiety      Aspergillus (H)      Aspergillus pneumonia (H) 11/20/2020     Benign essential hypertension      C. difficile colitis      Cytomegalovirus (CMV) viremia (H)      Dermatomyositis (H)      Dysplasia of cervix, low grade (ESTRADA 1)      EBV (Franklin-Barr virus) viremia      ESRD (end stage renal disease) on dialysis (H)      ILD (interstitial lung disease) (H)      Lung replaced by transplant (H)      Osteopenia      Paroxysmal atrial fibrillation (H)      Pneumocystis jiroveci pneumonia (H)      PONV (postoperative nausea and vomiting)      Post-menopause      Pulmonary hypertension (H)      Raynaud's disease      Seronegative rheumatoid arthritis (H)      Past Surgical History:   Procedure Laterality Date     BRONCHOSCOPY (RIGID OR FLEXIBLE), DIAGNOSTIC N/A 4/10/2018    Procedure: COMBINED BRONCHOSCOPY (RIGID OR FLEXIBLE), LAVAGE;;  Surgeon: Mariposa Donohue MD;  [facility]: [facility]     BRONCHOSCOPY (RIGID OR FLEXIBLE), DIAGNOSTIC N/A 12/23/2020    Procedure: BRONCHOSCOPY, WITH BRONCHOALVEOLAR LAVAGE;  Surgeon: Uri Bass MD;  [facility]: [facility]     BRONCHOSCOPY (RIGID OR FLEXIBLE), DILATE BRONCHUS / TRACHEA N/A 10/11/2018    Procedure: BRONCHOSCOPY (RIGID OR FLEXIBLE), DILATE BRONCHUS / TRACHEA;  Flexible And Rigid Bronchoscopy and Dilation;  Surgeon: Wilber Lin MD;  [facility]     BRONCHOSCOPY FLEXIBLE N/A 3/13/2018    Procedure:  BRONCHOSCOPY FLEXIBLE;  Flexible Bronchoscopy ;  Surgeon: Gissell Sanchez MD;  [facility]: [facility]     BRONCHOSCOPY FLEXIBLE N/A 5/9/2018    Procedure: BRONCHOSCOPY FLEXIBLE;;  Surgeon: Wilber Lin MD;  [facility]     BRONCHOSCOPY FLEXIBLE AND RIGID N/A 9/10/2018    Procedure: BRONCHOSCOPY FLEXIBLE AND RIGID;  Flexible and Rigid Bronchoscopy with Balloon Dilation, tissue debulking with cryo, and Right mainstem bronchus stent placement;  Surgeon: Wilber Lin MD;  [facility]     BRONCHOSCOPY RIGID N/A 6/6/2018    Procedure: BRONCHOSCOPY RIGID;;  Surgeon: Lopez Macias MD;  [facility]     BRONCHOSCOPY, DILATE BRONCHUS, STENT BRONCHUS, COMBINED N/A 6/11/2018    Procedure: COMBINED BRONCHOSCOPY, DILATE BRONCHUS, STENT BRONCHUS;  Flexible Bronchoscopy, Balloon Dilation, Bronchial Washings;  Surgeon: Wilber Lin MD;  [facility]     BRONCHOSCOPY, DILATE BRONCHUS, STENT BRONCHUS, COMBINED Right 7/10/2018    Procedure: COMBINED BRONCHOSCOPY, DILATE BRONCHUS, STENT BRONCHUS;  Flexible Bronchoscopy, Balloon Dilation, Bronchial Washings  ;  Surgeon: Wilber Lin MD;  [facility]     BRONCHOSCOPY, DILATE BRONCHUS, STENT BRONCHUS, COMBINED N/A 8/2/2018    Procedure: COMBINED BRONCHOSCOPY, DILATE BRONCHUS, STENT BRONCHUS;  Flexible Bronchoscopy, Bronchial Washings, Balloon Dilation;  Surgeon: Wilber Lin MD;  [facility]     BRONCHOSCOPY, DILATE BRONCHUS, STENT BRONCHUS, COMBINED N/A 8/20/2018    Procedure: COMBINED BRONCHOSCOPY, DILATE BRONCHUS, STENT BRONCHUS;  Flexible Bronchoscopy, Balloon Dilation;  Surgeon: Wilber Lin MD;  [facility]     BRONCHOSCOPY, DILATE BRONCHUS, STENT BRONCHUS, COMBINED N/A 10/29/2018    Procedure: Flexible Bronchoscopy, Balloon Dilation, Stent Revision, Airway Examination And Therapeutic Suctioning, Cyro Tumor Debulking;  Surgeon: Wilber Lin MD;  [facility]     BRONCHOSCOPY,  DILATE BRONCHUS, STENT BRONCHUS, COMBINED N/A 11/20/2018    Procedure: Rigid Bronchoscopy, Stent Removal and dilitation;  Surgeon: Wilber Lin MD;  [facility]     BRONCHOSCOPY, DILATE BRONCHUS, STENT BRONCHUS, COMBINED N/A 12/14/2018    Procedure: Flexible And Rigid Bronchoscopy, Balloon Dilation, Bronchial Washing;  Surgeon: Wilber Lin MD;  [facility]     BRONCHOSCOPY, DILATE BRONCHUS, STENT BRONCHUS, COMBINED N/A 1/17/2019    Procedure: Flexible And Rigid Bronchoscopy, Balloon Dilation.;  Surgeon: Wilber Lin MD;  [facility]     BRONCHOSCOPY, DILATE BRONCHUS, STENT BRONCHUS, COMBINED N/A 3/7/2019    Procedure: Flexible and Rigid Bronchoscopy, Bronchial Washing, Balloon Dilation;  Surgeon: Wilber Lin MD;  [facility]     BRONCHOSCOPY, DILATE BRONCHUS, STENT BRONCHUS, COMBINED N/A 6/6/2019    Procedure: Rigid and Flexible Bronchoscopy, Balloon Dilation;  Surgeon: Wilber Lin MD;  [facility]     BRONCHOSCOPY, DILATE BRONCHUS, STENT BRONCHUS, COMBINED N/A 10/11/2019    Procedure: Flexible and Rigid Bronchoscopy, Balloon Dilation, Bronchoalveolar Lagave;  Surgeon: Wilber Lin MD;  [facility]     BRONCHOSCOPY, DILATE BRONCHUS, STENT BRONCHUS, COMBINED N/A 2/19/2021    Procedure: BRONCHOSCOPY, flexible, airway dilation, and bronchial wash;  Surgeon: Wilber Lin MD;  [facility]     BRONCHOSCOPY, DILATE BRONCHUS, STENT BRONCHUS, COMBINED N/A 4/9/2021    Procedure: BRONCHOSCOPY, flexible and rigid, Airway suctioning;  Surgeon: Mati Norris MD;  Location:  OR     CV RIGHT HEART CATH MEASUREMENTS RECORDED N/A 3/10/2020    Procedure: CV RIGHT HEART CATH;  Surgeon: Wai Garcia MD;  Location:  HEART CARDIAC CATH LAB     ENT SURGERY      tonsillectomy as a child     ESOPHAGOSCOPY, GASTROSCOPY, DUODENOSCOPY (EGD), COMBINED N/A 10/29/2018    Procedure: COMBINED ESOPHAGOSCOPY, GASTROSCOPY, DUODENOSCOPY  (EGD) with biopsies and polypectomy;  Surgeon: Chente Bloom MD;  [facility]     INSERT EXTRACORPORAL MEMBRANE OXYGENATOR ADULT (OUTSIDE OR) N/A 2/27/2018    Procedure: INSERT EXTRACORPORAL MEMBRANE OXYGENATOR ADULT (OUTSIDE OR);  INSERT EXTRACORPORAL MEMBRANE OXYGENATOR ADULT (OUTSIDE OR) ;  Surgeon: Toby Hernandez MD;  [facility]     IR CVC TUNNEL PLACEMENT > 5 YRS OF AGE  10/25/2019     IR DIALYSIS FISTULOGRAM LEFT  3/2/2021     IR DIALYSIS MECH THROMB, PTA  3/2/2021     IR FLUORO 0-1 HOUR  5/7/2021     IR GASTRO JEJUNOSTOMY TUBE PLACEMENT  2/16/2021     IR PARACENTESIS  1/8/2020     IR THORACENTESIS  9/13/2019     IR TRANSCATHETER BIOPSY  1/8/2020     LASER CO2 BRONCHOSCOPY N/A 4/30/2021    Procedure: Flexible and Rigid Bronchoscopy and Tissue Debulking with CO2 Laser Assistance;  Surgeon: Mati Norris MD;  [facility]     LASER CO2 BRONCHOSCOPY N/A 6/11/2021    Procedure: BRONCHOSCOPY, Flexible and Rigid Bronchoscopy, Tissue Debulking with cryo Assistance, airway dilation,;  Surgeon: Mati Norris MD;  [facility]     LASER CO2 BRONCHOSCOPY N/A 9/16/2021    Procedure: BRONCHOSCOPY, flexible and rigid, CO2 Laser Debulking;  Surgeon: Mati Norris MD;  [facility]     LASER CO2 BRONCHOSCOPY N/A 2/11/2022    Procedure: flexible, rigid bronchoscopy, tissue debulking, airway dilation, co2 laser, bronchoalveolar lavage;  Surgeon: Juana Baugh MD;  [facility]     no prior surgery       REMOVE EXTRACORPORAL MEMBRANE OXYGENATOR ADULT N/A 3/3/2018    Procedure: REMOVE EXTRACORPORAL MEMBRANE OXYGENATOR ADULT;  Removal of Right Femoral Venous and Right Axillary Arterial Extracorporeal Membrane Oxygenator;  Surgeon: Toby Hernandez MD;  [facility]     TRANSPLANT LUNG RECIPIENT SINGLE X2 Bilateral 3/1/2018    Procedure: TRANSPLANT LUNG RECIPIENT SINGLE X2;  Median Sternotomy, Extracorporeal Membrane Oxygenator, bilateral sequential lung  transplant;  Surgeon: Toby Hernandez MD;  Location:  OR           Family History:     Family History   Problem Relation Age of Onset     Hypertension Mother      Arthritis Mother      Pancreatic Cancer Father      Diabetes Father             Social History:     Social History     Socioeconomic History     Marital status:      Spouse name: Not on file     Number of children: Not on file     Years of education: Not on file     Highest education level: Not on file   Occupational History     Not on file   Tobacco Use     Smoking status: Never Smoker     Smokeless tobacco: Never Used   Substance and Sexual Activity     Alcohol use: No     Alcohol/week: 1.0 standard drink     Types: 1 Glasses of wine per week     Drug use: No     Sexual activity: Not on file   Other Topics Concern     Parent/sibling w/ CABG, MI or angioplasty before 65F 55M? No   Social History Narrative    3/6/2019 - Lives with . Has three daughters. Four grandchildren (two ). No pets. Travelled previously to [place]. Has visited [place] several times.      Social Determinants of Health     Financial Resource Strain: Not on file   Food Insecurity: Not on file   Transportation Needs: Not on file   Physical Activity: Not on file   Stress: Not on file   Social Connections: Not on file   Intimate Partner Violence: Not on file   Housing Stability: Not on file            Medications:     Current Outpatient Medications   Medication     calcium acetate (PHOSLO) 667 MG CAPS capsule     acetaminophen (TYLENOL) 325 MG tablet     albuterol (PROVENTIL) (2.5 MG/3ML) 0.083% neb solution     azaTHIOprine (IMURAN) 50 MG tablet     budesonide (PULMICORT) 0.5 MG/2ML neb solution     Magnesium Cl-Calcium Carbonate (SLOW-MAG) 71.5-119 MG TBEC     metoprolol tartrate (LOPRESSOR) 25 MG tablet     multivitamin RENAL (RENAVITE RX/NEPHROVITE) 1 MG tablet     pantoprazole (PROTONIX) 40 MG EC tablet     posaconazole (NOXAFIL) 100 MG EC  "tablet     predniSONE (DELTASONE) 2.5 MG tablet     predniSONE (DELTASONE) 5 MG tablet     sulfamethoxazole-trimethoprim (BACTRIM) 400-80 MG tablet     tacrolimus (GENERIC EQUIVALENT) 0.5 MG capsule     tacrolimus (GENERIC EQUIVALENT) 1 MG capsule     No current facility-administered medications for this visit.            Physical Exam:   BP (!) 151/87 (BP Location: Right arm)   Pulse 79   Ht 1.626 m (5' 4\")   Wt 58.5 kg (129 lb)   LMP 06/07/2014 (Exact Date)   SpO2 99%   BMI 22.14 kg/m      GENERAL: alert, NAD  HEENT: NCAT, EOMI, no scleral icterus, oral mucosa moist and without lesions  Neck: no cervical or supraclavicular adenopathy  Lungs: moderate airflow, scattered basilar crackles with expiratory wheezing on the right  CV: RRR, S1S2, no murmurs noted  Abdomen: normoactive BS, soft, non tender   Lymph:trace BLE edema  Neuro: AAO X 3, CN 2-12 grossly intact  Psychiatric: normal affect, good eye contact  Skin: no rash, jaundice or lesions on limited exam  MSK: left knee in brace         Data:   All laboratory and imaging data reviewed.      Recent Results (from the past 168 hour(s))   COVID-19 Virus (Coronavirus) by PCR (External Result)    Collection Time: 05/23/22 10:13 AM   Result Value Ref Range    COVID-19 Virus by PCR (External Result) Undetected Undetected   COVID-19 VIRUS (CORONAVIRUS) BY PCR (EXTERNAL RESULT)    Collection Time: 05/23/22 10:13 AM   Result Value Ref Range    Specimen Source Nasopharynx     COVID-19 Virus PCR to [employer] - Result Undetected Undetected    SARS-CoV-2 PCR Comment SEE COMMENTS    General PFT Lab (Please always keep checked)    Collection Time: 05/26/22  6:01 AM   Result Value Ref Range    FVC-Pred 3.20 L    FVC-Pre 1.56 L    FVC-%Pred-Pre 48 %    FEV1-Pre 1.25 L    FEV1-%Pred-Pre 49 %    FEV1FVC-Pred 79 %    FEV1FVC-Pre 80 %    FEFMax-Pred 6.35 L/sec    FEFMax-Pre 3.20 L/sec    FEFMax-%Pred-Pre 50 %    FEF2575-Pred 2.32 L/sec    FEF2575-Pre 1.11 L/sec    BTW9829-%Pred-Pre " 48 %    ExpTime-Pre 6.68 sec    FIFMax-Pre 2.77 L/sec    VC-Pred 3.29 L    VC-Pre 1.46 L    VC-%Pred-Pre 44 %    IC-Pred 2.32 L    IC-Pre 1.12 L    IC-%Pred-Pre 48 %    ERV-Pred 0.97 L    ERV-Pre 0.34 L    ERV-%Pred-Pre 35 %    FEV1FEV6-Pred 81 %    FEV1FEV6-Pre 80 %    FRCPleth-Pred 2.70 L    FRCPleth-Pre 1.97 L    FRCPleth-%Pred-Pre 72 %    RVPleth-Pred 1.89 L    RVPleth-Pre 1.62 L    RVPleth-%Pred-Pre 86 %    TLCPleth-Pred 4.94 L    TLCPleth-Pre 3.08 L    TLCPleth-%Pred-Pre 62 %    DLCOunc-Pred 20.31 ml/min/mmHg    DLCOunc-Pre 10.06 ml/min/mmHg    DLCOunc-%Pred-Pre 49 %    VA-Pre 2.07 L    VA-%Pred-Pre 43 %    FEV1SVC-Pred 77 %    FEV1SVC-Pre 86 %   Magnesium    Collection Time: 05/26/22  7:50 AM   Result Value Ref Range    Magnesium 1.8 1.6 - 2.3 mg/dL   Basic metabolic panel    Collection Time: 05/26/22  7:50 AM   Result Value Ref Range    Sodium 137 133 - 144 mmol/L    Potassium 3.5 3.4 - 5.3 mmol/L    Chloride 96 94 - 109 mmol/L    Carbon Dioxide (CO2) 32 20 - 32 mmol/L    Anion Gap 9 3 - 14 mmol/L    Urea Nitrogen 42 (H) 7 - 30 mg/dL    Creatinine 3.98 (H) 0.52 - 1.04 mg/dL    Calcium 9.5 8.5 - 10.1 mg/dL    Glucose 110 (H) 70 - 99 mg/dL    GFR Estimate 12 (L) >60 mL/min/1.73m2   Hemoglobin A1c    Collection Time: 05/26/22  7:50 AM   Result Value Ref Range    Hemoglobin A1C 5.2 0.0 - 5.6 %   Lipid Profile    Collection Time: 05/26/22  7:50 AM   Result Value Ref Range    Cholesterol 171 <200 mg/dL    Triglycerides 155 (H) <150 mg/dL    Direct Measure HDL 69 >=50 mg/dL    LDL Cholesterol Calculated 71 <=100 mg/dL    Non HDL Cholesterol 102 <130 mg/dL    Patient Fasting > 8hrs? Unknown    INR    Collection Time: 05/26/22  7:50 AM   Result Value Ref Range    INR 0.96 0.85 - 1.15   CBC with platelets and differential    Collection Time: 05/26/22  7:50 AM   Result Value Ref Range    WBC Count 5.6 4.0 - 11.0 10e3/uL    RBC Count 3.49 (L) 3.80 - 5.20 10e6/uL    Hemoglobin 11.2 (L) 11.7 - 15.7 g/dL    Hematocrit 36.2  35.0 - 47.0 %     (H) 78 - 100 fL    MCH 32.1 26.5 - 33.0 pg    MCHC 30.9 (L) 31.5 - 36.5 g/dL    RDW 16.0 (H) 10.0 - 15.0 %    Platelet Count 199 150 - 450 10e3/uL    % Neutrophils 64 %    % Lymphocytes 18 %    % Monocytes 15 %    % Eosinophils 1 %    % Basophils 1 %    % Immature Granulocytes 1 %    NRBCs per 100 WBC 0 <1 /100    Absolute Neutrophils 3.7 1.6 - 8.3 10e3/uL    Absolute Lymphocytes 1.0 0.8 - 5.3 10e3/uL    Absolute Monocytes 0.8 0.0 - 1.3 10e3/uL    Absolute Eosinophils 0.1 0.0 - 0.7 10e3/uL    Absolute Basophils 0.0 0.0 - 0.2 10e3/uL    Absolute Immature Granulocytes 0.0 <=0.4 10e3/uL    Absolute NRBCs 0.0 10e3/uL     PFT interpretation:  Maneuver: valid and met ATS guidelines  Normal ratio with decreased FEV1 and FVC  Compared to prior: FEV1 of 1.25 is stable from prior, FVC down 140 cc  Decrease in FVC is likely related to restriction; lung volumes would be necessary to determine      Again, thank you for allowing me to participate in the care of your patient.        Sincerely,        Ame Chow PA-C

## 2022-05-26 NOTE — OR NURSING
Pt had bronch with sedation, no endoscopic interventions. Pt tolerated procedure well. Pt stable at time of transfer to  recovery. Report given to Lisa GIL for transport to .

## 2022-05-26 NOTE — PATIENT INSTRUCTIONS
Patient Instructions  1. Continue to hydrate with 60-70 oz fluids daily.  2. Continue to exercise daily or most days of the week.  3. Follow up with your primary care provider for annual gender health maintenance procedures.  4. Follow up with colonoscopy schedule.  5. Follow up with annual dermatology visits.  6. Mikayla will reach out to neurology and the kidney team asking them to call you.   7. Endocrine referral for osteoporosis.     Next transplant clinic appointment: 4 months with lab, xray, PFT before appt  Next lab draw: monthly

## 2022-05-26 NOTE — NURSING NOTE
"5/26/22 bronchoscopy with lavage    Reason: inspection, follow up previous OR bronchs to check on stenosis. From previous OR bronch procedure note: \"Discussed case with Dr. Norris after the procedure as he has done many of Ms. Blue's prior bronchs. Despite completing a significant dilation the previous time, patient only felt slightly better, and continues to feel well today. Her PFTs one month after the last dilation did not show any change compared to PFTs done one day prior to her last dilation. Her bronch today shows similar narrowing compared to previous bronchoscopy and overall her stenosis has not progressed to where it had been in 2019. There was even a 1.5 year time interval between her 10/2019 bronch and 2/2021 bronch where there was some narrowing in the right mainstem anastomosis, but not significantly so. I question whether continued dilations are beneficial as they seem to have minimal change on symptoms and PFTs. Will plan on doing an inspection bronchoscopy in 3 months in endoscopy and if stable, may not need further scheduled bronchs. \"    Date of transplant 3/1/18   Transplant type lung  Date of labs 5/26/22  BUN 42 DDAVP no, no biopies to be done  Plt 199  INR 0.96 Anticoag therapy none Last dose N/A    "

## 2022-05-26 NOTE — DISCHARGE INSTRUCTIONS
Discharge Instructions after Bronchoscopy    Activity  ___ You had medicine to relax and for pain. You may feel dizzy or sleepy.  For 24 hours:   Do not drive or use heavy equipment.   Do not make important decisions.   Do not drink any alcohol.    Diet  ___ When you can swallow easily, you may go back to your regular diet, medicines  and light exercise.    Follow-up  ___ We took small tissue or fluid samples to study. We will call you with the results in about 10 business days.    Call right away if you have:   Unusual chest pain   Temperature above 100.6  F (37.5  C)   Coughing that does not stop.    If you have severe pain, bleeding, or shortness of breath, go to an emergency room.    If you have questions, call:  Monday to Friday, 8 a.m. to 4:30 p.m.  Adult Pulmonology Clinic: [phone]    After hours:  Hospital: [phone] (Ask for the pulmonary fellow on call)

## 2022-05-26 NOTE — NURSING NOTE
Chief Complaint   Patient presents with     Lung Transplant     3 month follow up      Vitals were taken and medications were reconciled.     Cecille Reed RMA  8:28 AM

## 2022-05-26 NOTE — PROGRESS NOTES
Vascular access paged: Hi, PIV needed for EDYTA Blue prior to her bronchoscopy in [facility]. Thank you, Janiya RN *[phone]

## 2022-05-26 NOTE — NURSING NOTE
Transplant Coordinator Note    Reason for visit: Post lung transplant follow up visit   Coordinator: Present (via phone)  Caregiver: Ranjan    Health concerns addressed today:  1. Resp: breathing good.   2. Dealing with knee issue. Saw ortho last week, was supposed to see neuro for eval first.   3. DEXA- osteoporosis.   4.     Activity/rehab: Limited due to knee.  Oxygen needs: RA  Pain management/RX: Denies  High risk donor: Yes  CMV status: D+/R+  DVT/PE: H/o DVT  Post op AFIB/follow up with EP: One time occurrence of a-fib  PJP prophylactic: Bactrim    Pt Education: medications (use/dose/side effects), how/when to call coordinator, frequency of labs, s/s of infection/rejection, call prior to starting any new medications, lab/vital sign book    Health Maintenance:     Last colonoscopy:     Next colonoscopy due:     Dermatology:    Vaccinations this visit:     Labs, CXR, PFTs reviewed with patient  Medication record reviewed and reconciled  Questions and concerns addressed    Patient Instructions  1. Continue to hydrate with 60-70 oz fluids daily.  2. Continue to exercise daily or most days of the week.  3. Follow up with your primary care provider for annual gender health maintenance procedures.  4. Follow up with colonoscopy schedule.  5. Follow up with annual dermatology visits.  6. Mikayla will reach out to neurology and the kidney team asking them to call you.   7. Endocrine referral for osteoporosis.     Next transplant clinic appointment: 4 months with lab, xray, PFT before appt  Next lab draw: monthly    AVS printed at time of check out

## 2022-05-27 LAB
EBV DNA COPIES/ML, INSTRUMENT: ABNORMAL COPIES/ML
EBV DNA SPEC NAA+PROBE-LOG#: 4.9 {LOG_COPIES}/ML

## 2022-05-31 DIAGNOSIS — Z94.2 S/P LUNG TRANSPLANT (H): Primary | ICD-10-CM

## 2022-05-31 LAB
DONOR IDENTIFICATION: NORMAL
DSA COMMENTS: NORMAL
DSA PRESENT: NO
DSA TEST METHOD: NORMAL
ORGAN: NORMAL
SA 1 CELL: NORMAL
SA 1 TEST METHOD: NORMAL
SA 2 CELL: NORMAL
SA 2 TEST METHOD: NORMAL
SA1 HI RISK ABY: NORMAL
SA1 MOD RISK ABY: NORMAL
SA2 HI RISK ABY: NORMAL
SA2 MOD RISK ABY: NORMAL
UNACCEPTABLE ANTIGENS: NORMAL
UNOS CPRA: 0
ZZZSA 1  COMMENTS: NORMAL
ZZZSA 2 COMMENTS: NORMAL

## 2022-06-02 ENCOUNTER — TELEPHONE (OUTPATIENT)
Dept: TRANSPLANT | Facility: CLINIC | Age: 62
End: 2022-06-02
Payer: MEDICARE

## 2022-06-02 DIAGNOSIS — K76.89 LIVER DYSFUNCTION: ICD-10-CM

## 2022-06-02 DIAGNOSIS — N18.5 CHRONIC KIDNEY DISEASE, STAGE V (H): ICD-10-CM

## 2022-06-02 DIAGNOSIS — I10 ESSENTIAL HYPERTENSION: ICD-10-CM

## 2022-06-02 DIAGNOSIS — Z01.818 PRE-TRANSPLANT EVALUATION FOR KIDNEY TRANSPLANT: ICD-10-CM

## 2022-06-02 DIAGNOSIS — J98.4 DISEASE OF LUNG: ICD-10-CM

## 2022-06-02 NOTE — TELEPHONE ENCOUNTER
Discussed the results of the selection committee with Kecia. We talked about the different consults that were ordered and follow ups: cardiology, hepatology, surgery and frailty assessment. She is in good understanding of the plan. She is also scheduled to see a neurologist to rule out nerve issues and a source of her knee pain.

## 2022-06-14 ENCOUNTER — TELEPHONE (OUTPATIENT)
Dept: TRANSPLANT | Facility: CLINIC | Age: 62
End: 2022-06-14
Payer: MEDICARE

## 2022-06-14 NOTE — TELEPHONE ENCOUNTER
Kecia calls reporting she has the rest of the things for her kidney transplant listing scheduled, but still hasn't heard from neurology for her knee. Scheduling line given, requested she call writer back if she is not able to schedule and will reach out myself.

## 2022-06-21 ENCOUNTER — TELEPHONE (OUTPATIENT)
Dept: TRANSPLANT | Facility: CLINIC | Age: 62
End: 2022-06-21

## 2022-06-21 DIAGNOSIS — I10 BENIGN ESSENTIAL HYPERTENSION: ICD-10-CM

## 2022-06-21 RX ORDER — METOPROLOL TARTRATE 25 MG/1
25 TABLET, FILM COATED ORAL 2 TIMES DAILY
Qty: 60 TABLET | Refills: 11 | Status: SHIPPED | OUTPATIENT
Start: 2022-06-21 | End: 2022-06-21

## 2022-06-21 RX ORDER — METOPROLOL TARTRATE 25 MG/1
25 TABLET, FILM COATED ORAL 2 TIMES DAILY
Qty: 60 TABLET | Refills: 11 | Status: SHIPPED | OUTPATIENT
Start: 2022-06-21 | End: 2022-06-22

## 2022-06-21 NOTE — TELEPHONE ENCOUNTER
Medication Refill  Route to N  Instruct the patient to first contact their pharmacy. If they have called their pharmacy and require further assistance, route to LPN.    Pharmacy Name: [id number] - [place], [address] AT [facility]      Name of Medication: metoprolol tartrate (LOPRESSOR) 25 MG tablet Dose: 1 tablet (25 mg) by Oral or Feeding Tube route 2 times daily, Disp-60 tablet, R-11, E-Prescribe    When will the patient be out of this medication?: Less than 24 hours [facility] then page if no answer)

## 2022-06-22 ENCOUNTER — TELEPHONE (OUTPATIENT)
Dept: TRANSPLANT | Facility: CLINIC | Age: 62
End: 2022-06-22

## 2022-06-22 DIAGNOSIS — I10 BENIGN ESSENTIAL HYPERTENSION: ICD-10-CM

## 2022-06-22 RX ORDER — METOPROLOL TARTRATE 25 MG/1
25 TABLET, FILM COATED ORAL 2 TIMES DAILY
Qty: 60 TABLET | Refills: 11 | Status: SHIPPED | OUTPATIENT
Start: 2022-06-22 | End: 2023-07-06

## 2022-06-22 NOTE — TELEPHONE ENCOUNTER
[facility]oc notified pt they have questions  re her Lopressor Rx that was sent to them  Pt is out of med needs to pck up today  Call pt back when done

## 2022-06-23 ENCOUNTER — TELEPHONE (OUTPATIENT)
Dept: PULMONOLOGY | Facility: CLINIC | Age: 62
End: 2022-06-23

## 2022-06-26 ENCOUNTER — LAB (OUTPATIENT)
Dept: LAB | Facility: CLINIC | Age: 62
End: 2022-06-26
Payer: MEDICARE

## 2022-06-26 DIAGNOSIS — Z94.2 S/P LUNG TRANSPLANT (H): ICD-10-CM

## 2022-06-27 ENCOUNTER — TELEPHONE (OUTPATIENT)
Dept: TRANSPLANT | Facility: CLINIC | Age: 62
End: 2022-06-27

## 2022-06-27 ENCOUNTER — HOSPITAL ENCOUNTER (OUTPATIENT)
Facility: CLINIC | Age: 62
Discharge: HOME | End: 2022-06-27
Admitting: PHYSICIAN ASSISTANT
Payer: MEDICARE

## 2022-06-27 PROCEDURE — 80197 ASSAY OF TACROLIMUS: CPT

## 2022-06-27 NOTE — TELEPHONE ENCOUNTER
DATE:  6/27/2022     TIME OF RECEIPT FROM LAB:  1124    ORDERING PROVIDER: Ame Chow    LAB TEST:  BUN 81           Creatinine 5.9           Alk phos  341      RESULTS GIVEN WITH READ-BACK TO (PROVIDER):  Mikayla Latham RNCC    TIME LAB VALUE REPORTED TO PROVIDER:   1130

## 2022-06-28 LAB
TACROLIMUS BLD-MCNC: 11.8 UG/L (ref 5–15)
TME LAST DOSE: NORMAL H
TME LAST DOSE: NORMAL H

## 2022-06-30 ENCOUNTER — TELEPHONE (OUTPATIENT)
Dept: TRANSPLANT | Facility: CLINIC | Age: 62
End: 2022-06-30

## 2022-06-30 DIAGNOSIS — Z94.2 LUNG REPLACED BY TRANSPLANT (H): ICD-10-CM

## 2022-06-30 RX ORDER — TACROLIMUS 0.5 MG/1
0.5 CAPSULE ORAL 2 TIMES DAILY
Qty: 60 CAPSULE | Refills: 11 | Status: SHIPPED | OUTPATIENT
Start: 2022-06-30 | End: 2022-07-14

## 2022-06-30 NOTE — TELEPHONE ENCOUNTER
Tacrolimus level 11.8 at 12 hours, on 6/27/22.  Goal 8-10.   Current dose 1 mg in AM, 0.5 mg in PM    Dose changed to 0.5 mg in AM, 0.5 mg in PM   Recheck level on 7/11/22    Discussed with patient. She reports doing well, no concerns at this time.   Green [employer] message sent

## 2022-07-11 ENCOUNTER — LAB (OUTPATIENT)
Dept: LAB | Facility: CLINIC | Age: 62
End: 2022-07-11

## 2022-07-11 ENCOUNTER — TELEPHONE (OUTPATIENT)
Dept: TRANSPLANT | Facility: CLINIC | Age: 62
End: 2022-07-11

## 2022-07-11 DIAGNOSIS — Z94.2 S/P LUNG TRANSPLANT (H): ICD-10-CM

## 2022-07-11 PROCEDURE — 80197 ASSAY OF TACROLIMUS: CPT | Performed by: PHYSICIAN ASSISTANT

## 2022-07-11 NOTE — TELEPHONE ENCOUNTER
DATE:  7/11/2022     TIME OF RECEIPT FROM LAB:  11:24 AM    ORDERING PROVIDER:     LAB TEST:  Alk phos & Creatinine    LAB VALUE:  373 & 5.8    RESULTS GIVEN WITH READ-BACK TO (PROVIDER):  Mikayla Latham RN    TIME LAB VALUE REPORTED TO PROVIDER:   11:51 AM

## 2022-07-13 LAB
TACROLIMUS BLD-MCNC: 5.8 UG/L (ref 5–15)
TME LAST DOSE: NORMAL H
TME LAST DOSE: NORMAL H

## 2022-07-14 ENCOUNTER — TELEPHONE (OUTPATIENT)
Dept: TRANSPLANT | Facility: CLINIC | Age: 62
End: 2022-07-14

## 2022-07-14 DIAGNOSIS — Z94.2 LUNG REPLACED BY TRANSPLANT (H): ICD-10-CM

## 2022-07-14 RX ORDER — TACROLIMUS 0.5 MG/1
CAPSULE ORAL
Qty: 90 CAPSULE | Refills: 11 | Status: SHIPPED | OUTPATIENT
Start: 2022-07-14 | End: 2023-01-17

## 2022-07-14 NOTE — TELEPHONE ENCOUNTER
Tacrolimus level 5.8 at 12 hours, on 7/11/22.  Goal 8-10.   Current dose 0.5 mg in AM, 0.5 mg in PM    Dose changed to 0.5 mg in AM, 1 mg in PM   Recheck level in 7-14 days.     Nurse to contact pt with dose change and plan to recheck.

## 2022-07-14 NOTE — TELEPHONE ENCOUNTER
Tacrolimus level 5.8 at 12 hours, on 7/11/22.  Goal 8-10.   Current dose 0.5 mg in AM, 0.5 mg in PM     Dose changed to 0.5 mg in AM, 1 mg in PM   Recheck level in 7-14 days.      Nurse to contact pt with dose change and plan to recheck.     Contacted Kecia with Taco level, dose change and next lab.    July 14, 2022 9:31 AM - Merna Webster LPN:

## 2022-07-25 NOTE — PROGRESS NOTES
RD assessing frailty. H/o lung txp 2018. Saw for virtual kidney txp stephanie 3/8/22. Is on HD.   Reported no activity d/t needing knee replacement. This is still the case. She reports her lack of mobility is all from needing a knee replacement, which is a work in progress. Hopefully can get one done soon.     Frail (3/5 points)- inability to complete walk test (d/t knee pain), reduced , and low activity level (inability to do so d/t needing a knee replacement)  ** Frail status should be improved with knee replacement. This is the driving factor of her frailty.    -- Weight: stable   -- Exhaustion/Energy Levels: energy level good, but is in pain so unable to do much  -- Activity: none d/t still needing a knee replacement   * Has a walker and gets around the house with walker  * Did not bring walker to clinic; therefore unable to get to the exam table or complete walk portion of test

## 2022-07-26 ENCOUNTER — OFFICE VISIT (OUTPATIENT)
Dept: CARDIOLOGY | Facility: CLINIC | Age: 62
End: 2022-07-26
Attending: INTERNAL MEDICINE
Payer: MEDICARE

## 2022-07-26 ENCOUNTER — ALLIED HEALTH/NURSE VISIT (OUTPATIENT)
Dept: TRANSPLANT | Facility: CLINIC | Age: 62
End: 2022-07-26
Attending: INTERNAL MEDICINE
Payer: MEDICARE

## 2022-07-26 ENCOUNTER — PRE VISIT (OUTPATIENT)
Dept: CARDIOLOGY | Facility: CLINIC | Age: 62
End: 2022-07-26

## 2022-07-26 VITALS — OXYGEN SATURATION: 99 % | DIASTOLIC BLOOD PRESSURE: 89 MMHG | HEART RATE: 77 BPM | SYSTOLIC BLOOD PRESSURE: 166 MMHG

## 2022-07-26 VITALS
OXYGEN SATURATION: 100 % | BODY MASS INDEX: 22.92 KG/M2 | SYSTOLIC BLOOD PRESSURE: 182 MMHG | WEIGHT: 129.4 LBS | HEART RATE: 77 BPM | DIASTOLIC BLOOD PRESSURE: 83 MMHG

## 2022-07-26 DIAGNOSIS — I10 ESSENTIAL HYPERTENSION: ICD-10-CM

## 2022-07-26 DIAGNOSIS — J98.4 DISEASE OF LUNG: ICD-10-CM

## 2022-07-26 DIAGNOSIS — Z01.818 PRE-TRANSPLANT EVALUATION FOR KIDNEY TRANSPLANT: ICD-10-CM

## 2022-07-26 DIAGNOSIS — K76.89 LIVER DYSFUNCTION: ICD-10-CM

## 2022-07-26 DIAGNOSIS — Z01.818 PRE-TRANSPLANT EVALUATION FOR KIDNEY TRANSPLANT: Primary | ICD-10-CM

## 2022-07-26 DIAGNOSIS — N18.5 CHRONIC KIDNEY DISEASE, STAGE V (H): ICD-10-CM

## 2022-07-26 DIAGNOSIS — I25.110 CORONARY ARTERY DISEASE INVOLVING NATIVE CORONARY ARTERY OF NATIVE HEART WITH UNSTABLE ANGINA PECTORIS (H): ICD-10-CM

## 2022-07-26 DIAGNOSIS — Z94.2 LUNG REPLACED BY TRANSPLANT (H): Primary | ICD-10-CM

## 2022-07-26 LAB — LVEF ECHO: NORMAL

## 2022-07-26 PROCEDURE — G0463 HOSPITAL OUTPT CLINIC VISIT: HCPCS

## 2022-07-26 PROCEDURE — 93306 TTE W/DOPPLER COMPLETE: CPT | Performed by: INTERNAL MEDICINE

## 2022-07-26 PROCEDURE — 99205 OFFICE O/P NEW HI 60 MIN: CPT | Mod: 25 | Performed by: INTERNAL MEDICINE

## 2022-07-26 PROCEDURE — 99215 OFFICE O/P EST HI 40 MIN: CPT | Performed by: SURGERY

## 2022-07-26 ASSESSMENT — PAIN SCALES - GENERAL
PAINLEVEL: NO PAIN (0)
PAINLEVEL: NO PAIN (0)

## 2022-07-26 NOTE — LETTER
7/26/2022      RE: Kecia Blue  [address] [place] [address]       Dear Colleague,    Thank you for the opportunity to participate in the care of your patient, Kecia Blue, at the [facility] [address] [facility]. Please see a copy of my visit note below.       SUBJECTIVE:  Kecia Blue is a 61 year old female who presents for renal transplant evaluation.  CKD stage V.  On hemodialysis for 2-1/2 years.  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    PMH most remarkable for ILD (status post bilateral lung transplant in 2018), ESRD (on dialysis TID), history of surgical related DVT (not on anticoagulation anymore), and paroxysmal atrial fibrillation in the setting of acute illness. She was recently hospitalized earlier this year for acute hypoxic respiratory failure which developed into ARDS. At that time she had several runs of rapid atrial fibrillation. She was evaluated in the hospital by the inpatient electrophysiology team. The inpatient team advised treatment of the underlying respiratory failure as it was thought that this was the driving factor behind her atrial fibrillation    Currently patient having significant pain in the left knee joint.  Planning for replacement.  Because of this she is not very active but denied cardiac symptoms.  No prior cardiac history.  Only cardiac medication is Lopressor 25 mg twice a day.  No diabetes.  Never smoked.  No premature coronary artery disease in family.  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     Priority: Medium     Pre-diabetes 04/06/2022     Priority: Medium     History of Pneumocystis jirovecii pneumonia 04/06/2022     Priority: Medium     Healthcare-associated pneumonia 09/20/2021     Priority: Medium     Lung transplant status, bilateral  (H) 09/20/2021     Priority: Medium     Loculated pleural effusion 04/29/2021     Priority: Medium     Pneumonia due to Pneumocystis jirovecii (H) 02/21/2021     Priority: Medium     Episodic anisocoria 02/21/2021     Priority: Medium     End stage renal failure on dialysis (H)      Priority: Medium     Respiratory failure (H) 01/24/2021     Priority: Medium     Bronchial stenosis, right 01/24/2021     Priority: Medium     Added automatically from request for surgery [id number]       Aspergillus pneumonia (H) 11/20/2020     Priority: Medium     Hemodialysis patient (H) 08/10/2020     Priority: Medium     Patient has a tunneled catheter in place for hemodialysis  She has dialysis 3 times a week M-W-F in [place]       Shortness of breath 01/22/2020     Priority: Medium     Added automatically from request for surgery [id number]       Pulmonary hypertension (H) 01/22/2020     Priority: Medium     Added automatically from request for surgery [id number]       RADHA (acute kidney injury) (H) 10/21/2019     Priority: Medium     Empyema of lung (H) 09/13/2019     Priority: Medium     Administration of long-term prophylactic antibiotics 09/13/2019     Priority: Medium     Cholecystitis 09/12/2019     Priority: Medium     Low hemoglobin 12/13/2018     Priority: Medium     Nausea and vomiting 12/04/2018     Priority: Medium     Dehydration 08/01/2018     Priority: Medium     Acute kidney injury (H) 08/01/2018     Priority: Medium     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 Thanks!)      Transplant providers, see Epic [place] for  additional detailed information      Transplant Hospitalization Summary:  Bilateral Sequential Lung Transplant       Involved in a trial? (ex. INSPIRE, EXPAND):     Notable Intra-Operative Information: None      DONOR Information:    CDC Increased Risk: Y/N?      PATIENT Information:  Serologies:     Donor Recipient Intervention   CMV status      EBV status      HSV status        Transplant Complications:   PRE-op (Y/N) POST-op (Y/N)    Trach      Vent support      Chest tube  n/a    ECMO   Decannulation date: &&&     Primary Graft Dysfunction Documentation:   POD#1   (0-24 hours) POD#2   (25-48 hours) POD#3   (49-72 hours)   Date of data      Time of data      Intubated? Y/N Y/N Y/N   PaO2      FiO2      P/F Ratio      PGD Grade   (0=mild, 3=severe)      ECMO? Y/N Y/N Y/N   Inhaled NO? Y/N Y/N Y/N   ISHLT PGD Scoring  Grade 0 - PaO2/FiO2 >300 and normal chest radiograph (must be extubated to be Grade 0)  Grade 1 - PaO2/FiO2 >300 and diffuse allograft infiltrates on chest radiograph  Grade 2 - PaO2/FiO2 between 200 and 300  Grade 3 - PaO2/FiO2 <200    Post-Op Data:  Complication Y/N Date Comments   Date of Extubation(s) N/A     Return to OR?      Reintubated?      Date Last Chest Tube Removed N/A     Rejection?      Afib?      Renal failure?      HCAP?      DVT/PE?      Native lung pathology results        Prednisone Taper Plan:  3/15/18 12.5 12.5   3/22/18 12 10   4/26/18 10 10   5/24/18 10 7.5   6/22/18 7.5 7.5   7/20/18 7.5 5   8/24/18 5 5   9/21/18 5 2.5       Discharge:  Discharge to: &&&Home / TCU / ARU  Discharge date: &&&       ILD (interstitial lung disease) (H) 02/27/2018     Priority: Medium    .  Current Outpatient Medications   Medication Sig     acetaminophen (TYLENOL) 325 MG tablet Take 1 tablet (325 mg) by mouth every 4 hours as needed for mild pain or fever     albuterol (PROVENTIL) (2.5 MG/3ML) 0.083% neb solution Take 1 vial (2.5 mg) by nebulization daily     azaTHIOprine (IMURAN) 50 MG tablet  Take 0.5 tablets (25 mg) by mouth daily     budesonide (PULMICORT) 0.5 MG/2ML neb solution Take 2 mLs (0.5 mg) by nebulization daily     calcium acetate (PHOSLO) 667 MG CAPS capsule Take 1 capsule (667 mg) by mouth 3 times daily (with meals) (largest meal)     Magnesium Cl-Calcium Carbonate (SLOW-MAG) 71.5-119 MG TBEC Take 3 tablets by mouth four times a week Take on non dialysis days T/Th/Sa/Su     metoprolol tartrate (LOPRESSOR) 25 MG tablet 1 tablet (25 mg) by Oral or Feeding Tube route 2 times daily     multivitamin RENAL (RENAVITE RX/NEPHROVITE) 1 MG tablet Take 1 tablet by mouth daily     pantoprazole (PROTONIX) 40 MG EC tablet Take 1 tablet (40 mg) by mouth every morning (before breakfast)     posaconazole (NOXAFIL) 100 MG EC tablet Take 3 tablets (300 mg) by mouth daily     predniSONE (DELTASONE) 2.5 MG tablet Take 1 tablet (2.5 mg) by mouth every evening     predniSONE (DELTASONE) 5 MG tablet Take 1 tablet (5 mg) by mouth every morning     sulfamethoxazole-trimethoprim (BACTRIM) 400-80 MG tablet Take 1 tablet by mouth Every Mon, Wed, Fri Morning     tacrolimus (GENERIC EQUIVALENT) 0.5 MG capsule Take 0.5mg in am (1 cap) and 1mg in pm (2 caps)     No current facility-administered medications for this visit.     Past Medical History:   Diagnosis Date     Acute on chronic respiratory failure with hypoxia (H) 02/21/2018     Anisocoria      Antisynthetase syndrome (H) 2014     Anxiety      Aspergillus (H)      Aspergillus pneumonia (H) 11/20/2020     Benign essential hypertension      C. difficile colitis      Cytomegalovirus (CMV) viremia (H)      Dermatomyositis (H)      Dysplasia of cervix, low grade (ESTRADA 1)      EBV (Franklin-Barr virus) viremia      ESRD (end stage renal disease) on dialysis (H)      ILD (interstitial lung disease) (H)      Lung replaced by transplant (H)      Osteopenia      Paroxysmal atrial fibrillation (H)      Pneumocystis jiroveci pneumonia (H)      PONV (postoperative nausea and  vomiting)      Post-menopause      Pulmonary hypertension (H)      Raynaud's disease      Seronegative rheumatoid arthritis (H)      Past Surgical History:   Procedure Laterality Date     BRONCHOSCOPY (RIGID OR FLEXIBLE), DIAGNOSTIC N/A 4/10/2018    Procedure: COMBINED BRONCHOSCOPY (RIGID OR FLEXIBLE), LAVAGE;;  Surgeon: Mariposa Donohue MD;  [facility]     BRONCHOSCOPY (RIGID OR FLEXIBLE), DIAGNOSTIC N/A 12/23/2020    Procedure: BRONCHOSCOPY, WITH BRONCHOALVEOLAR LAVAGE;  Surgeon: Uri Bass MD;  [facility]     BRONCHOSCOPY (RIGID OR FLEXIBLE), DIAGNOSTIC N/A 5/26/2022    Procedure: BRONCHOSCOPY, WITH BRONCHOALVEOLAR LAVAGE;  Surgeon: Uri Bass MD;  [facility]: [facility]     BRONCHOSCOPY (RIGID OR FLEXIBLE), DILATE BRONCHUS / TRACHEA N/A 10/11/2018    Procedure: BRONCHOSCOPY (RIGID OR FLEXIBLE), DILATE BRONCHUS / TRACHEA;  Flexible And Rigid Bronchoscopy and Dilation;  Surgeon: Wilber Lin MD;  [facility]     BRONCHOSCOPY FLEXIBLE N/A 3/13/2018    Procedure: BRONCHOSCOPY FLEXIBLE;  Flexible Bronchoscopy ;  Surgeon: Gissell Sanchez MD;  [facility]     BRONCHOSCOPY FLEXIBLE N/A 5/9/2018    Procedure: BRONCHOSCOPY FLEXIBLE;;  Surgeon: Wilber Lin MD;  [facility]     BRONCHOSCOPY FLEXIBLE AND RIGID N/A 9/10/2018    Procedure: BRONCHOSCOPY FLEXIBLE AND RIGID;  Flexible and Rigid Bronchoscopy with Balloon Dilation, tissue debulking with cryo, and Right mainstem bronchus stent placement;  Surgeon: Wilber Lin MD;  [facility]     BRONCHOSCOPY RIGID N/A 6/6/2018    Procedure: BRONCHOSCOPY RIGID;;  Surgeon: Lopez Macias MD;  [facility]     BRONCHOSCOPY, DILATE BRONCHUS, STENT BRONCHUS, COMBINED N/A 6/11/2018    Procedure: COMBINED BRONCHOSCOPY, DILATE BRONCHUS, STENT BRONCHUS;  Flexible Bronchoscopy, Balloon Dilation, Bronchial Washings;  Surgeon: Wilber Lin MD;  [facility]     BRONCHOSCOPY, DILATE BRONCHUS,  STENT BRONCHUS, COMBINED Right 7/10/2018    Procedure: COMBINED BRONCHOSCOPY, DILATE BRONCHUS, STENT BRONCHUS;  Flexible Bronchoscopy, Balloon Dilation, Bronchial Washings  ;  Surgeon: Wilber Lin MD;  [facility]     BRONCHOSCOPY, DILATE BRONCHUS, STENT BRONCHUS, COMBINED N/A 8/2/2018    Procedure: COMBINED BRONCHOSCOPY, DILATE BRONCHUS, STENT BRONCHUS;  Flexible Bronchoscopy, Bronchial Washings, Balloon Dilation;  Surgeon: Wilber Lin MD;  [facility]     BRONCHOSCOPY, DILATE BRONCHUS, STENT BRONCHUS, COMBINED N/A 8/20/2018    Procedure: COMBINED BRONCHOSCOPY, DILATE BRONCHUS, STENT BRONCHUS;  Flexible Bronchoscopy, Balloon Dilation;  Surgeon: Wilber Lin MD;  [facility]     BRONCHOSCOPY, DILATE BRONCHUS, STENT BRONCHUS, COMBINED N/A 10/29/2018    Procedure: Flexible Bronchoscopy, Balloon Dilation, Stent Revision, Airway Examination And Therapeutic Suctioning, Cyro Tumor Debulking;  Surgeon: Wilber Lin MD;  [facility]     BRONCHOSCOPY, DILATE BRONCHUS, STENT BRONCHUS, COMBINED N/A 11/20/2018    Procedure: Rigid Bronchoscopy, Stent Removal and dilitation;  Surgeon: Wilber Lin MD;  [facility]     BRONCHOSCOPY, DILATE BRONCHUS, STENT BRONCHUS, COMBINED N/A 12/14/2018    Procedure: Flexible And Rigid Bronchoscopy, Balloon Dilation, Bronchial Washing;  Surgeon: Wilber Lin MD;  [facility]     BRONCHOSCOPY, DILATE BRONCHUS, STENT BRONCHUS, COMBINED N/A 1/17/2019    Procedure: Flexible And Rigid Bronchoscopy, Balloon Dilation.;  Surgeon: Wilber Lin MD;  [facility]     BRONCHOSCOPY, DILATE BRONCHUS, STENT BRONCHUS, COMBINED N/A 3/7/2019    Procedure: Flexible and Rigid Bronchoscopy, Bronchial Washing, Balloon Dilation;  Surgeon: Wilber Lin MD;  [facility]     BRONCHOSCOPY, DILATE BRONCHUS, STENT BRONCHUS, COMBINED N/A 6/6/2019    Procedure: Rigid and Flexible Bronchoscopy, Balloon Dilation;   Surgeon: Wilber Lin MD;  [facility]     BRONCHOSCOPY, DILATE BRONCHUS, STENT BRONCHUS, COMBINED N/A 10/11/2019    Procedure: Flexible and Rigid Bronchoscopy, Balloon Dilation, Bronchoalveolar Lagave;  Surgeon: Wilber Lin MD;  [facility]     BRONCHOSCOPY, DILATE BRONCHUS, STENT BRONCHUS, COMBINED N/A 2/19/2021    Procedure: BRONCHOSCOPY, flexible, airway dilation, and bronchial wash;  Surgeon: Wilber Lin MD;  [facility]     BRONCHOSCOPY, DILATE BRONCHUS, STENT BRONCHUS, COMBINED N/A 4/9/2021    Procedure: BRONCHOSCOPY, flexible and rigid, Airway suctioning;  Surgeon: Mati Norris MD;  [facility]     CV RIGHT HEART CATH MEASUREMENTS RECORDED N/A 3/10/2020    Procedure: CV RIGHT HEART CATH;  Surgeon: Wai Garcia MD;  [facility]     ENT SURGERY      tonsillectomy as a child     ESOPHAGOSCOPY, GASTROSCOPY, DUODENOSCOPY (EGD), COMBINED N/A 10/29/2018    Procedure: COMBINED ESOPHAGOSCOPY, GASTROSCOPY, DUODENOSCOPY (EGD) with biopsies and polypectomy;  Surgeon: Chente Bloom MD;  [facility]     INSERT EXTRACORPORAL MEMBRANE OXYGENATOR ADULT (OUTSIDE OR) N/A 2/27/2018    Procedure: INSERT EXTRACORPORAL MEMBRANE OXYGENATOR ADULT (OUTSIDE OR);  INSERT EXTRACORPORAL MEMBRANE OXYGENATOR ADULT (OUTSIDE OR) ;  Surgeon: Toby Hernandez MD;  [facility]     IR CVC TUNNEL PLACEMENT > 5 YRS OF AGE  10/25/2019     IR DIALYSIS FISTULOGRAM LEFT  3/2/2021     IR DIALYSIS MECH THROMB, PTA  3/2/2021     IR FLUORO 0-1 HOUR  5/7/2021     IR GASTRO JEJUNOSTOMY TUBE PLACEMENT  2/16/2021     IR PARACENTESIS  1/8/2020     IR THORACENTESIS  9/13/2019     IR TRANSCATHETER BIOPSY  1/8/2020     LASER CO2 BRONCHOSCOPY N/A 4/30/2021    Procedure: Flexible and Rigid Bronchoscopy and Tissue Debulking with CO2 Laser Assistance;  Surgeon: Mati Norris MD;  [facility]     LASER CO2 BRONCHOSCOPY N/A 6/11/2021    Procedure:  BRONCHOSCOPY, Flexible and Rigid Bronchoscopy, Tissue Debulking with cryo Assistance, airway dilation,;  Surgeon: Mati Norris MD;  [facility]     LASER CO2 BRONCHOSCOPY N/A 2021    Procedure: BRONCHOSCOPY, flexible and rigid, CO2 Laser Debulking;  Surgeon: Mati Norris MD;  [facility]     LASER CO2 BRONCHOSCOPY N/A 2022    Procedure: flexible, rigid bronchoscopy, tissue debulking, airway dilation, co2 laser, bronchoalveolar lavage;  Surgeon: Juana Baugh MD;  [facility]     no prior surgery       REMOVE EXTRACORPORAL MEMBRANE OXYGENATOR ADULT N/A 3/3/2018    Procedure: REMOVE EXTRACORPORAL MEMBRANE OXYGENATOR ADULT;  Removal of Right Femoral Venous and Right Axillary Arterial Extracorporeal Membrane Oxygenator;  Surgeon: Toby Hernandez MD;  [facility]     TRANSPLANT LUNG RECIPIENT SINGLE X2 Bilateral 3/1/2018    Procedure: TRANSPLANT LUNG RECIPIENT SINGLE X2;  Median Sternotomy, Extracorporeal Membrane Oxygenator, bilateral sequential lung transplant;  Surgeon: Toby Hernandez MD;  [facility]     No Known Allergies  Social History     Socioeconomic History     Marital status:      Spouse name: Not on file     Number of children: Not on file     Years of education: Not on file     Highest education level: Not on file   Occupational History     Not on file   Tobacco Use     Smoking status: Never Smoker     Smokeless tobacco: Never Used   Substance and Sexual Activity     Alcohol use: No     Alcohol/week: 1.0 standard drink     Types: 1 Glasses of wine per week     Drug use: No     Sexual activity: Not on file   Other Topics Concern     Parent/sibling w/ CABG, MI or angioplasty before 65F 55M? No   Social History Narrative    3/6/2019 - Lives with . Has three daughters. Four grandchildren (two ). No pets. Travelled previously to [facility]. Has visited [place] several times.      Social Determinants of Health     Financial  Resource Strain: Not on file   Food Insecurity: Not on file   Transportation Needs: Not on file   Physical Activity: Not on file   Stress: Not on file   Social Connections: Not on file   Intimate Partner Violence: Not on file   Housing Stability: Not on file     Family History   Problem Relation Age of Onset     Hypertension Mother      Arthritis Mother      Pancreatic Cancer Father      Diabetes Father           REVIEW OF SYSTEMS:  General: negative, fever, chills, night sweats  Skin: negative, acne, rash and scaling  Eyes: negative, double vision, eye pain and photophobia  Ears/Nose/Throat: negative, nasal congestion and purulent rhinorrhea  Respiratory: No dyspnea on exertion, No cough, No hemoptysis and negative  Cardiovascular: negative, palpitations, tachycardia, irregular heart beat, chest pain, exertional chest pain or pressure, paroxysmal nocturnal dyspnea, dyspnea on exertion and orthopnea         OBJECTIVE:  Blood pressure (!) 166/89, pulse 77, last menstrual period 06/07/2014, SpO2 99 %, not currently breastfeeding.  General Appearance: healthy, alert and no distress  Head: Normocephalic. No masses, lesions, tenderness or abnormalities  Eyes: conjuctiva clear, PERRL, EOM intact  Ears: External ears normal. Canals clear. TM's normal.  Nose: Nares normal  Mouth: normal  Neck: Supple, no cervical adenopathy, no thyromegaly  Lungs: clear to auscultation  Cardiac: regular rate and rhythm, normal S1 and S2, no murmur       ASSESSMENT/PLAN:  Patient here for renal transplant evaluation.  CKD stage V on hemodialysis for past 2-1/2 years.  Etiology of CKD is calcineurin inhibitor toxicity status post lung transplant.  Patient with interstitial lung disease and had bilateral lung transplant in 2018.  Postop course was complicated by mainstem bronchus stricture requiring multiple dilations anoxic respiratory failure ARDS and atrial fibrillation at the time of this complication.  Currently patient is not very active  due to left knee joint pain.  But denied cardiac symptoms.  Only cardiac medication is Lopressor 25 mg twice a day.  Reviewed records.  EKG shows normal sinus rhythm.  LVH by voltage.  Inferior lateral ST-T changes of LVH.  Echocardiogram done today reviewed.  This was completely normal with normal PA pressure.  Prior to lung transplant patient has severe pulmonary hypertension.  She had a repeat right heart cath in 2020 which showed normal pressures.  Prior to transplant in February 2018 patient had a coronary angiogram which showed minimal coronary artery disease.  As part of transplant evaluation we will plan for a Lexiscan.  If this is normal patient will be able to proceed with transplant.  Her  is a potential donor.  Patient will be contacted with the test result.  Today's echocardiogram reviewed and result discussed with patient.  Normal biventricular function.  Normal PA pressure of 36 mmHg.  No significant valvular abnormalities.  Total visit duration 60 minutes.  This included face-to-face interview, physical exam, chart review, review of echocardiograms, coronary angiogram, right heart catheterization and documentation      Please do not hesitate to contact me if you have any questions/concerns.     Sincerely,     BAILEE Charles MD

## 2022-07-26 NOTE — PATIENT INSTRUCTIONS
"Cardiology Providers you saw during your visit:  Dr. Ochoa    Medication changes: None     Follow up:   Lexiscan stress test   Dr. Ochoa will reach out to you with test results    Prep for mynor-nuc stress test: Report to the  [facility] Waiting Room at the [facility]. The hospital is located at [address] [place] [address] on the [place] bank of the campus.    This test can take up to 3 hours.    NPO 3 hours prior, Water is ok and encouraged  NO alcohol, smoking, caffeine, or decaf products 12 hours prior  TAKE ALL medications as prescribed.     If you have any questions, contact Nura Ribera LPN. We are encouraging the use of [employer] to communicate with your HealthCare Provider     To contact the [facility], please call, [phone]  To schedule an appointment or to leave a message for your Care Team Press #1  If you are a physician calling for another physician Press #2  For Billing Press #3  For Medical Records Press #4\"    "

## 2022-07-26 NOTE — PROGRESS NOTES
SUBJECTIVE:  Kecia Blue is a 61 year old female who presents for renal transplant evaluation.  CKD stage V.  On hemodialysis for 2-1/2 years.  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    PMH most remarkable for ILD (status post bilateral lung transplant in 2018), ESRD (on dialysis TID), history of surgical related DVT (not on anticoagulation anymore), and paroxysmal atrial fibrillation in the setting of acute illness. She was recently hospitalized earlier this year for acute hypoxic respiratory failure which developed into ARDS. At that time she had several runs of rapid atrial fibrillation. She was evaluated in the hospital by the inpatient electrophysiology team. The inpatient team advised treatment of the underlying respiratory failure as it was thought that this was the driving factor behind her atrial fibrillation    Currently patient having significant pain in the left knee joint.  Planning for replacement.  Because of this she is not very active but denied cardiac symptoms.  No prior cardiac history.  Only cardiac medication is Lopressor 25 mg twice a day.  No diabetes.  Never smoked.  No premature coronary artery disease in family.  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     Priority: Medium     Pre-diabetes 04/06/2022     Priority: Medium     History of Pneumocystis jirovecii pneumonia 04/06/2022     Priority: Medium     Healthcare-associated pneumonia 09/20/2021     Priority: Medium     Lung transplant status, bilateral (H) 09/20/2021     Priority: Medium     Loculated pleural effusion 04/29/2021     Priority: Medium     Pneumonia due to Pneumocystis jirovecii (H) 02/21/2021     Priority: Medium     Episodic anisocoria 02/21/2021     Priority: Medium     End stage renal failure on dialysis (H)      Priority: Medium     Respiratory failure (H) 01/24/2021     Priority:  Medium     Bronchial stenosis, right 01/24/2021     Priority: Medium     Added automatically from request for surgery [id number]       Aspergillus pneumonia (H) 11/20/2020     Priority: Medium     Hemodialysis patient (H) 08/10/2020     Priority: Medium     Patient has a tunneled catheter in place for hemodialysis  She has dialysis 3 times a week M-W-F in [place]       Shortness of breath 01/22/2020     Priority: Medium     Added automatically from request for surgery [id number]       Pulmonary hypertension (H) 01/22/2020     Priority: Medium     Added automatically from request for surgery [id number]       RADHA (acute kidney injury) (H) 10/21/2019     Priority: Medium     Empyema of lung (H) 09/13/2019     Priority: Medium     Administration of long-term prophylactic antibiotics 09/13/2019     Priority: Medium     Cholecystitis 09/12/2019     Priority: Medium     Low hemoglobin 12/13/2018     Priority: Medium     Nausea and vomiting 12/04/2018     Priority: Medium     Dehydration 08/01/2018     Priority: Medium     Acute kidney injury (H) 08/01/2018     Priority: Medium     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 Thanks!)      Transplant providers, see [facility] for additional detailed information      Transplant Hospitalization Summary:  Bilateral Sequential Lung Transplant       Involved in a trial? (ex. INSPIRE, EXPAND):     Notable Intra-Operative Information: None      DONOR Information:    CDC Increased Risk: Y/N?      PATIENT Information:  Serologies:     Donor Recipient Intervention   CMV status      EBV  status      HSV status        Transplant Complications:   PRE-op (Y/N) POST-op (Y/N)    Trach      Vent support      Chest tube  n/a    ECMO   Decannulation date: &&&     Primary Graft Dysfunction Documentation:   POD#1   (0-24 hours) POD#2   (25-48 hours) POD#3   (49-72 hours)   Date of data      Time of data      Intubated? Y/N Y/N Y/N   PaO2      FiO2      P/F Ratio      PGD Grade   (0=mild, 3=severe)      ECMO? Y/N Y/N Y/N   Inhaled NO? Y/N Y/N Y/N   ISHLT PGD Scoring  Grade 0 - PaO2/FiO2 >300 and normal chest radiograph (must be extubated to be Grade 0)  Grade 1 - PaO2/FiO2 >300 and diffuse allograft infiltrates on chest radiograph  Grade 2 - PaO2/FiO2 between 200 and 300  Grade 3 - PaO2/FiO2 <200    Post-Op Data:  Complication Y/N Date Comments   Date of Extubation(s) N/A     Return to OR?      Reintubated?      Date Last Chest Tube Removed N/A     Rejection?      Afib?      Renal failure?      HCAP?      DVT/PE?      Native lung pathology results        Prednisone Taper Plan:  3/15/18 12.5 12.5   3/22/18 12 10   4/26/18 10 10   5/24/18 10 7.5   6/22/18 7.5 7.5   7/20/18 7.5 5   8/24/18 5 5   9/21/18 5 2.5       Discharge:  Discharge to: &&&Home / TCU / ARU  Discharge date: &&&       ILD (interstitial lung disease) (H) 02/27/2018     Priority: Medium    .  Current Outpatient Medications   Medication Sig     acetaminophen (TYLENOL) 325 MG tablet Take 1 tablet (325 mg) by mouth every 4 hours as needed for mild pain or fever     albuterol (PROVENTIL) (2.5 MG/3ML) 0.083% neb solution Take 1 vial (2.5 mg) by nebulization daily     azaTHIOprine (IMURAN) 50 MG tablet Take 0.5 tablets (25 mg) by mouth daily     budesonide (PULMICORT) 0.5 MG/2ML neb solution Take 2 mLs (0.5 mg) by nebulization daily     calcium acetate (PHOSLO) 667 MG CAPS capsule Take 1 capsule (667 mg) by mouth 3 times daily (with meals) (largest meal)     Magnesium Cl-Calcium Carbonate (SLOW-MAG) 71.5-119 MG TBEC Take 3 tablets by mouth four times a  week Take on non dialysis days T/Th/Sa/Su     metoprolol tartrate (LOPRESSOR) 25 MG tablet 1 tablet (25 mg) by Oral or Feeding Tube route 2 times daily     multivitamin RENAL (RENAVITE RX/NEPHROVITE) 1 MG tablet Take 1 tablet by mouth daily     pantoprazole (PROTONIX) 40 MG EC tablet Take 1 tablet (40 mg) by mouth every morning (before breakfast)     posaconazole (NOXAFIL) 100 MG EC tablet Take 3 tablets (300 mg) by mouth daily     predniSONE (DELTASONE) 2.5 MG tablet Take 1 tablet (2.5 mg) by mouth every evening     predniSONE (DELTASONE) 5 MG tablet Take 1 tablet (5 mg) by mouth every morning     sulfamethoxazole-trimethoprim (BACTRIM) 400-80 MG tablet Take 1 tablet by mouth Every Mon, Wed, Fri Morning     tacrolimus (GENERIC EQUIVALENT) 0.5 MG capsule Take 0.5mg in am (1 cap) and 1mg in pm (2 caps)     No current facility-administered medications for this visit.     Past Medical History:   Diagnosis Date     Acute on chronic respiratory failure with hypoxia (H) 02/21/2018     Anisocoria      Antisynthetase syndrome (H) 2014     Anxiety      Aspergillus (H)      Aspergillus pneumonia (H) 11/20/2020     Benign essential hypertension      C. difficile colitis      Cytomegalovirus (CMV) viremia (H)      Dermatomyositis (H)      Dysplasia of cervix, low grade (ESTRADA 1)      EBV (Franklin-Barr virus) viremia      ESRD (end stage renal disease) on dialysis (H)      ILD (interstitial lung disease) (H)      Lung replaced by transplant (H)      Osteopenia      Paroxysmal atrial fibrillation (H)      Pneumocystis jiroveci pneumonia (H)      PONV (postoperative nausea and vomiting)      Post-menopause      Pulmonary hypertension (H)      Raynaud's disease      Seronegative rheumatoid arthritis (H)      Past Surgical History:   Procedure Laterality Date     BRONCHOSCOPY (RIGID OR FLEXIBLE), DIAGNOSTIC N/A 4/10/2018    Procedure: COMBINED BRONCHOSCOPY (RIGID OR FLEXIBLE), LAVAGE;;  Surgeon: Mariposa Donohue MD;  Location:   GI     BRONCHOSCOPY (RIGID OR FLEXIBLE), DIAGNOSTIC N/A 12/23/2020    Procedure: BRONCHOSCOPY, WITH BRONCHOALVEOLAR LAVAGE;  Surgeon: Uri Bass MD;  [facility]: [facility]     BRONCHOSCOPY (RIGID OR FLEXIBLE), DIAGNOSTIC N/A 5/26/2022    Procedure: BRONCHOSCOPY, WITH BRONCHOALVEOLAR LAVAGE;  Surgeon: Uri Bass MD;  [facility]: [facility]     BRONCHOSCOPY (RIGID OR FLEXIBLE), DILATE BRONCHUS / TRACHEA N/A 10/11/2018    Procedure: BRONCHOSCOPY (RIGID OR FLEXIBLE), DILATE BRONCHUS / TRACHEA;  Flexible And Rigid Bronchoscopy and Dilation;  Surgeon: Wilber Lin MD;  [facility]     BRONCHOSCOPY FLEXIBLE N/A 3/13/2018    Procedure: BRONCHOSCOPY FLEXIBLE;  Flexible Bronchoscopy ;  Surgeon: Gissell Sanchez MD;  [facility]     BRONCHOSCOPY FLEXIBLE N/A 5/9/2018    Procedure: BRONCHOSCOPY FLEXIBLE;;  Surgeon: Wilber Lin MD;  [facility]     BRONCHOSCOPY FLEXIBLE AND RIGID N/A 9/10/2018    Procedure: BRONCHOSCOPY FLEXIBLE AND RIGID;  Flexible and Rigid Bronchoscopy with Balloon Dilation, tissue debulking with cryo, and Right mainstem bronchus stent placement;  Surgeon: Wilber Lin MD;  [facility]     BRONCHOSCOPY RIGID N/A 6/6/2018    Procedure: BRONCHOSCOPY RIGID;;  Surgeon: Lopez Macias MD;  [facility]     BRONCHOSCOPY, DILATE BRONCHUS, STENT BRONCHUS, COMBINED N/A 6/11/2018    Procedure: COMBINED BRONCHOSCOPY, DILATE BRONCHUS, STENT BRONCHUS;  Flexible Bronchoscopy, Balloon Dilation, Bronchial Washings;  Surgeon: Wilber Lin MD;  [facility]     BRONCHOSCOPY, DILATE BRONCHUS, STENT BRONCHUS, COMBINED Right 7/10/2018    Procedure: COMBINED BRONCHOSCOPY, DILATE BRONCHUS, STENT BRONCHUS;  Flexible Bronchoscopy, Balloon Dilation, Bronchial Washings  ;  Surgeon: Wilber Lin MD;  [facility]     BRONCHOSCOPY, DILATE BRONCHUS, STENT BRONCHUS, COMBINED N/A 8/2/2018    Procedure: COMBINED BRONCHOSCOPY, DILATE BRONCHUS, STENT  BRONCHUS;  Flexible Bronchoscopy, Bronchial Washings, Balloon Dilation;  Surgeon: Wilber Lin MD;  [facility]     BRONCHOSCOPY, DILATE BRONCHUS, STENT BRONCHUS, COMBINED N/A 8/20/2018    Procedure: COMBINED BRONCHOSCOPY, DILATE BRONCHUS, STENT BRONCHUS;  Flexible Bronchoscopy, Balloon Dilation;  Surgeon: Wilber Lin MD;  [facility]     BRONCHOSCOPY, DILATE BRONCHUS, STENT BRONCHUS, COMBINED N/A 10/29/2018    Procedure: Flexible Bronchoscopy, Balloon Dilation, Stent Revision, Airway Examination And Therapeutic Suctioning, Cyro Tumor Debulking;  Surgeon: Wilber Lin MD;  [facility]     BRONCHOSCOPY, DILATE BRONCHUS, STENT BRONCHUS, COMBINED N/A 11/20/2018    Procedure: Rigid Bronchoscopy, Stent Removal and dilitation;  Surgeon: Wilber Lin MD;  [facility]     BRONCHOSCOPY, DILATE BRONCHUS, STENT BRONCHUS, COMBINED N/A 12/14/2018    Procedure: Flexible And Rigid Bronchoscopy, Balloon Dilation, Bronchial Washing;  Surgeon: Wilber Lin MD;  [facility]     BRONCHOSCOPY, DILATE BRONCHUS, STENT BRONCHUS, COMBINED N/A 1/17/2019    Procedure: Flexible And Rigid Bronchoscopy, Balloon Dilation.;  Surgeon: Wilber Lin MD;  [facility]     BRONCHOSCOPY, DILATE BRONCHUS, STENT BRONCHUS, COMBINED N/A 3/7/2019    Procedure: Flexible and Rigid Bronchoscopy, Bronchial Washing, Balloon Dilation;  Surgeon: Wilber Lin MD;  [facility]     BRONCHOSCOPY, DILATE BRONCHUS, STENT BRONCHUS, COMBINED N/A 6/6/2019    Procedure: Rigid and Flexible Bronchoscopy, Balloon Dilation;  Surgeon: Wilber Lin MD;  [facility]     BRONCHOSCOPY, DILATE BRONCHUS, STENT BRONCHUS, COMBINED N/A 10/11/2019    Procedure: Flexible and Rigid Bronchoscopy, Balloon Dilation, Bronchoalveolar Lagave;  Surgeon: Wilber Lin MD;  [facility]     BRONCHOSCOPY, DILATE BRONCHUS, STENT BRONCHUS, COMBINED N/A 2/19/2021    Procedure:  BRONCHOSCOPY, flexible, airway dilation, and bronchial wash;  Surgeon: Wilber Lin MD;  [facility]     BRONCHOSCOPY, DILATE BRONCHUS, STENT BRONCHUS, COMBINED N/A 4/9/2021    Procedure: BRONCHOSCOPY, flexible and rigid, Airway suctioning;  Surgeon: Mati Norris MD;  [facility]     CV RIGHT HEART CATH MEASUREMENTS RECORDED N/A 3/10/2020    Procedure: CV RIGHT HEART CATH;  Surgeon: Wai Garcia MD;  [facility]     ENT SURGERY      tonsillectomy as a child     ESOPHAGOSCOPY, GASTROSCOPY, DUODENOSCOPY (EGD), COMBINED N/A 10/29/2018    Procedure: COMBINED ESOPHAGOSCOPY, GASTROSCOPY, DUODENOSCOPY (EGD) with biopsies and polypectomy;  Surgeon: Chente Bloom MD;  [facility]     INSERT EXTRACORPORAL MEMBRANE OXYGENATOR ADULT (OUTSIDE OR) N/A 2/27/2018    Procedure: INSERT EXTRACORPORAL MEMBRANE OXYGENATOR ADULT (OUTSIDE OR);  INSERT EXTRACORPORAL MEMBRANE OXYGENATOR ADULT (OUTSIDE OR) ;  Surgeon: Toby Hernandez MD;  [facility]     IR CVC TUNNEL PLACEMENT > 5 YRS OF AGE  10/25/2019     IR DIALYSIS FISTULOGRAM LEFT  3/2/2021     IR DIALYSIS MECH THROMB, PTA  3/2/2021     IR FLUORO 0-1 HOUR  5/7/2021     IR GASTRO JEJUNOSTOMY TUBE PLACEMENT  2/16/2021     IR PARACENTESIS  1/8/2020     IR THORACENTESIS  9/13/2019     IR TRANSCATHETER BIOPSY  1/8/2020     LASER CO2 BRONCHOSCOPY N/A 4/30/2021    Procedure: Flexible and Rigid Bronchoscopy and Tissue Debulking with CO2 Laser Assistance;  Surgeon: Mati Norris MD;  [facility]     LASER CO2 BRONCHOSCOPY N/A 6/11/2021    Procedure: BRONCHOSCOPY, Flexible and Rigid Bronchoscopy, Tissue Debulking with cryo Assistance, airway dilation,;  Surgeon: Mati Norris MD;  [facility]     LASER CO2 BRONCHOSCOPY N/A 9/16/2021    Procedure: BRONCHOSCOPY, flexible and rigid, CO2 Laser Debulking;  Surgeon: Mati Norris MD;  [facility]     LASER CO2 BRONCHOSCOPY N/A 2/11/2022     Procedure: flexible, rigid bronchoscopy, tissue debulking, airway dilation, co2 laser, bronchoalveolar lavage;  Surgeon: Juana Baugh MD;  [facility]     no prior surgery       REMOVE EXTRACORPORAL MEMBRANE OXYGENATOR ADULT N/A 3/3/2018    Procedure: REMOVE EXTRACORPORAL MEMBRANE OXYGENATOR ADULT;  Removal of Right Femoral Venous and Right Axillary Arterial Extracorporeal Membrane Oxygenator;  Surgeon: Toby Hernandez MD;  [facility]     TRANSPLANT LUNG RECIPIENT SINGLE X2 Bilateral 3/1/2018    Procedure: TRANSPLANT LUNG RECIPIENT SINGLE X2;  Median Sternotomy, Extracorporeal Membrane Oxygenator, bilateral sequential lung transplant;  Surgeon: Toby Hernandez MD;  [facility]     No Known Allergies  Social History     Socioeconomic History     Marital status:      Spouse name: Not on file     Number of children: Not on file     Years of education: Not on file     Highest education level: Not on file   Occupational History     Not on file   Tobacco Use     Smoking status: Never Smoker     Smokeless tobacco: Never Used   Substance and Sexual Activity     Alcohol use: No     Alcohol/week: 1.0 standard drink     Types: 1 Glasses of wine per week     Drug use: No     Sexual activity: Not on file   Other Topics Concern     Parent/sibling w/ CABG, MI or angioplasty before 65F 55M? No   Social History Narrative    3/6/2019 - Lives with . Has three daughters. Four grandchildren (two ). No pets. Travelled previously to [facility]. Has visited [place] several times.      Social Determinants of Health     Financial Resource Strain: Not on file   Food Insecurity: Not on file   Transportation Needs: Not on file   Physical Activity: Not on file   Stress: Not on file   Social Connections: Not on file   Intimate Partner Violence: Not on file   Housing Stability: Not on file     Family History   Problem Relation Age of Onset     Hypertension Mother      Arthritis  Mother      Pancreatic Cancer Father      Diabetes Father           REVIEW OF SYSTEMS:  General: negative, fever, chills, night sweats  Skin: negative, acne, rash and scaling  Eyes: negative, double vision, eye pain and photophobia  Ears/Nose/Throat: negative, nasal congestion and purulent rhinorrhea  Respiratory: No dyspnea on exertion, No cough, No hemoptysis and negative  Cardiovascular: negative, palpitations, tachycardia, irregular heart beat, chest pain, exertional chest pain or pressure, paroxysmal nocturnal dyspnea, dyspnea on exertion and orthopnea         OBJECTIVE:  Blood pressure (!) 166/89, pulse 77, last menstrual period 06/07/2014, SpO2 99 %, not currently breastfeeding.  General Appearance: healthy, alert and no distress  Head: Normocephalic. No masses, lesions, tenderness or abnormalities  Eyes: conjuctiva clear, PERRL, EOM intact  Ears: External ears normal. Canals clear. TM's normal.  Nose: Nares normal  Mouth: normal  Neck: Supple, no cervical adenopathy, no thyromegaly  Lungs: clear to auscultation  Cardiac: regular rate and rhythm, normal S1 and S2, no murmur       ASSESSMENT/PLAN:  Patient here for renal transplant evaluation.  CKD stage V on hemodialysis for past 2-1/2 years.  Etiology of CKD is calcineurin inhibitor toxicity status post lung transplant.  Patient with interstitial lung disease and had bilateral lung transplant in 2018.  Postop course was complicated by mainstem bronchus stricture requiring multiple dilations anoxic respiratory failure ARDS and atrial fibrillation at the time of this complication.  Currently patient is not very active due to left knee joint pain.  But denied cardiac symptoms.  Only cardiac medication is Lopressor 25 mg twice a day.  Reviewed records.  EKG shows normal sinus rhythm.  LVH by voltage.  Inferior lateral ST-T changes of LVH.  Echocardiogram done today reviewed.  This was completely normal with normal PA pressure.  Prior to lung transplant patient  has severe pulmonary hypertension.  She had a repeat right heart cath in 2020 which showed normal pressures.  Prior to transplant in February 2018 patient had a coronary angiogram which showed minimal coronary artery disease.  As part of transplant evaluation we will plan for a Lexiscan.  If this is normal patient will be able to proceed with transplant.  Her  is a potential donor.  Patient will be contacted with the test result.  Today's echocardiogram reviewed and result discussed with patient.  Normal biventricular function.  Normal PA pressure of 36 mmHg.  No significant valvular abnormalities.  Total visit duration 60 minutes.  This included face-to-face interview, physical exam, chart review, review of echocardiograms, coronary angiogram, right heart catheterization and documentation

## 2022-07-26 NOTE — NURSING NOTE
No medication changes today.     Plan for lexiscan stress test in September, we will contact pt with results. Procedure instructions reviewed with pt, questions answered.     Follow up with Dr. Ochoa based on test results.     Ernesto Chen RN   Cardiology Nurse Coordinator

## 2022-07-26 NOTE — NURSING NOTE
Chief Complaint   Patient presents with     Transplant Evaluation     Kidney tx eval/wait list     Blood pressure (!) 182/83, pulse 77, weight 58.7 kg (129 lb 6.4 oz), last menstrual period 06/07/2014, SpO2 100 %, not currently breastfeeding.    Jailyn Neri, CMA

## 2022-07-26 NOTE — PROGRESS NOTES
Transplant Surgery Consult Note     Medical record number: [id number]  YOB: 1960,   Consult requested  for evaluation of kidney transplant candidacy.    Assessment and Recommendations:Ms. Blue appears to be a good candidate for kidney transplantation and has a good understanding of the risks and benefits of this approach to the management of renal failure. The following issues should be addressed prior to finalizing her transplant candidacy:     Transplant order: Ms. 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  Recommend he be considered as a candidate for kidney transplant.  Cardiology consult for cardiac risk stratification to be ordered: Yes.  Seeing them today  CT abdomen and pelvis without contrast to be ordered for assessment of vascular targets: No  Transplant listing labs ordered to include HLA, ABOx2, Cr, etc.  Dietician consult ordered: Yes  Social work consult ordered: Yes  Imaging reports reviewed:  CT from 2/2021:  IMPRESSION:   1. No evidence of intraabdominal abscess or infection.   2. Slightly improved aeration of the diffuse groundglass opacities,  irregular interlobular septal thickening and consolidative opacities  in the lower lobes.   3. Stable size of partially calcified cystic structure abutting the  medial pleura in the left lower lobe, consistent with patient's known  chronic empyema.  4. Cholelithiasis with mild gallbladder mucosal enhancement and trace  pericholecystic fluid, likely due to anasarca/third spacing rather  than acute cholecystitis.  5. Mild degree of intramesenteric edema, soft tissue anasarca and  dependent pelvic fluid, likely due to third spacing.  6. Chronic appearing compression deformities of the T5 and T9  vertebral bodies. No suspicious osseous lesions.  7. Support devices as described in the  report.  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  She agrees and would like to move forward with this first.  There is mention of liver disease but her platelets adequate without evidence of thrombocytopenia, her spleen does not appear enlarged, and her liver does not have a cirrhotic appearance on her CT from .  I am doubtful she has appreciable portal hypertension at this time. I did not appreciate ascites on exam however Dr. Denise mentions elevated portal pressures and SAAG>1.1.  Bx in  showed:  Liver, needle biopsy:   - Congestive hepatopathy with moderate zone 3 pericellular fibrosis (see   microscopic description)   We should have hepatology comment on kidney alone transplant but I suspect she should be fine to proceed.  They have not seen her in over a year.        The majority of our visit was spent in counselling, discussing the medical and surgical risks of kidney transplantation. We discussed approximate wait time and how that is influenced by issues such as blood type and sensitization (PRA) and access to a living donor. I contrasted potential waiting time for living vs  donor kidneys from  normal (0-85%) or higher (%) kidney donor profile index (KDPI) donors and their associated outcomes. I would not recommend this individual to consider kidneys from high KDPI donors. The reason for this decision is best summarized as: should try to avoid delayed graft function. Potential surgical complications of kidney transplantation include bleeding, superficial or deep wound complications (infection, hernia, lymphocele), ureteral anastomotic failure (leak or stenosis), graft thrombosis, need for  reoperation and other issues such as cardiac complications, pneumonia, deep venous thrombosis, pulmonary embolism, post transplant diabetes and death. The potential for recurrent disease or need for retransplantation was also addressed. We discussed the possible need for ureteral stent (and subsequent removal), and the utility of protocol biopsy and laboratory studies to evaluate for rejection or recurrent disease. We discussed the risk of graft rejection, our center's average graft and patient survival rates, immunosuppression protocols, as well as the potential opportunity to participate in clinical trials.  We also discussed the average length of stay, recovery process, and posttransplant lab and monitoring protocol.  I emphasized the need for strict immunosuppression medication adherence and the potential for complications of immunosuppression such as skin cancer or lymphoma, as well as a very low but not zero risk of donor-derived disease transmission risks (infection, cancer). Ms. Blue asked good questions and her candidacy will be reviewed at our Multidisciplinary Selection Committee. Thank you for the opportunity to participate in Ms. Blue's care.      Total time: 60 minutes        Jacqueline Lehman MD FACS  Assistant Professor of Surgery  Director, Living Kidney Donor Program.    ---------------------------------------------------------------------------------------------------    HPI: Ms. 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 The patient is non-diabetic.   Was supposed to knee replacement in April      The patient is on dialysis.    Has potential kidney donors:  No.  Interested in participation in paired exchange if a donor is willing: Yes     The patient has the following pertinent history:       No    Yes  Dialysis:    []      [x] via:       Blood Transfusion                  []      [x]  Number of units:   Most recently:2021   Pregnancy:    []      [x] Number:   4     Previous Transplant:  []      [x] Details:  Lung 2018  Cancer    [x]      [] Comment:   Kidney stones   [x]      [] Comment:      Recurrent infections  [x]      []  Type:                  Bladder dysfunction  [x]      [] Cause:    Claudication   [x]      [] Distance:    Previous Amputation  [x]      [] Cause:     Chronic anticoagulation  [x]      [] Indication:   Yarsanism  [x]      []      Past Medical History:   Diagnosis Date     Acute on chronic respiratory failure with hypoxia (H) 02/21/2018     Anisocoria      Antisynthetase syndrome (H) 2014     Anxiety      Aspergillus (H)      Aspergillus pneumonia (H) 11/20/2020     Benign essential hypertension      C. difficile colitis      Cytomegalovirus (CMV) viremia (H)      Dermatomyositis (H)      Dysplasia of cervix, low grade (ESTRADA 1)      EBV (Franklin-Barr virus) viremia      ESRD (end stage renal disease) on dialysis (H)      ILD (interstitial lung disease) (H)      Lung replaced by transplant (H)      Osteopenia      Paroxysmal atrial fibrillation (H)      Pneumocystis jiroveci pneumonia (H)      PONV (postoperative nausea and vomiting)      Post-menopause      Pulmonary hypertension (H)      Raynaud's disease      Seronegative rheumatoid arthritis (H)      Past Surgical History:   Procedure Laterality Date     BRONCHOSCOPY (RIGID OR FLEXIBLE), DIAGNOSTIC N/A 4/10/2018    Procedure: COMBINED BRONCHOSCOPY (RIGID OR FLEXIBLE), LAVAGE;;  Surgeon: Mariposa Donohue MD;  [facility]     BRONCHOSCOPY (RIGID OR FLEXIBLE), DIAGNOSTIC N/A 12/23/2020    Procedure: BRONCHOSCOPY, WITH BRONCHOALVEOLAR LAVAGE;  Surgeon: Uri Bass MD;  [facility]     BRONCHOSCOPY (RIGID OR FLEXIBLE), DIAGNOSTIC N/A 5/26/2022    Procedure: BRONCHOSCOPY, WITH BRONCHOALVEOLAR LAVAGE;  Surgeon: Uri Bass MD;  [facility]     BRONCHOSCOPY (RIGID OR FLEXIBLE), DILATE BRONCHUS / TRACHEA N/A 10/11/2018    Procedure: BRONCHOSCOPY (RIGID OR FLEXIBLE), DILATE  BRONCHUS / TRACHEA;  Flexible And Rigid Bronchoscopy and Dilation;  Surgeon: Wilber Lin MD;  [facility]     BRONCHOSCOPY FLEXIBLE N/A 3/13/2018    Procedure: BRONCHOSCOPY FLEXIBLE;  Flexible Bronchoscopy ;  Surgeon: Gissell Sanchez MD;  [facility]     BRONCHOSCOPY FLEXIBLE N/A 5/9/2018    Procedure: BRONCHOSCOPY FLEXIBLE;;  Surgeon: Wilber Lin MD;  [facility]     BRONCHOSCOPY FLEXIBLE AND RIGID N/A 9/10/2018    Procedure: BRONCHOSCOPY FLEXIBLE AND RIGID;  Flexible and Rigid Bronchoscopy with Balloon Dilation, tissue debulking with cryo, and Right mainstem bronchus stent placement;  Surgeon: Wilber Lin MD;  [facility]     BRONCHOSCOPY RIGID N/A 6/6/2018    Procedure: BRONCHOSCOPY RIGID;;  Surgeon: Lopez Macias MD;  [facility]     BRONCHOSCOPY, DILATE BRONCHUS, STENT BRONCHUS, COMBINED N/A 6/11/2018    Procedure: COMBINED BRONCHOSCOPY, DILATE BRONCHUS, STENT BRONCHUS;  Flexible Bronchoscopy, Balloon Dilation, Bronchial Washings;  Surgeon: Wilber Lin MD;  [facility]     BRONCHOSCOPY, DILATE BRONCHUS, STENT BRONCHUS, COMBINED Right 7/10/2018    Procedure: COMBINED BRONCHOSCOPY, DILATE BRONCHUS, STENT BRONCHUS;  Flexible Bronchoscopy, Balloon Dilation, Bronchial Washings  ;  Surgeon: Wilber Lin MD;  [facility]     BRONCHOSCOPY, DILATE BRONCHUS, STENT BRONCHUS, COMBINED N/A 8/2/2018    Procedure: COMBINED BRONCHOSCOPY, DILATE BRONCHUS, STENT BRONCHUS;  Flexible Bronchoscopy, Bronchial Washings, Balloon Dilation;  Surgeon: Wilber Lin MD;  [facility]     BRONCHOSCOPY, DILATE BRONCHUS, STENT BRONCHUS, COMBINED N/A 8/20/2018    Procedure: COMBINED BRONCHOSCOPY, DILATE BRONCHUS, STENT BRONCHUS;  Flexible Bronchoscopy, Balloon Dilation;  Surgeon: Wilber Lin MD;  [facility]     BRONCHOSCOPY, DILATE BRONCHUS, STENT BRONCHUS, COMBINED N/A 10/29/2018    Procedure: Flexible Bronchoscopy,  Balloon Dilation, Stent Revision, Airway Examination And Therapeutic Suctioning, Cyro Tumor Debulking;  Surgeon: Wilber Lin MD;  [facility]     BRONCHOSCOPY, DILATE BRONCHUS, STENT BRONCHUS, COMBINED N/A 11/20/2018    Procedure: Rigid Bronchoscopy, Stent Removal and dilitation;  Surgeon: Wilber Lin MD;  [facility]     BRONCHOSCOPY, DILATE BRONCHUS, STENT BRONCHUS, COMBINED N/A 12/14/2018    Procedure: Flexible And Rigid Bronchoscopy, Balloon Dilation, Bronchial Washing;  Surgeon: Wilber Lin MD;  [facility]     BRONCHOSCOPY, DILATE BRONCHUS, STENT BRONCHUS, COMBINED N/A 1/17/2019    Procedure: Flexible And Rigid Bronchoscopy, Balloon Dilation.;  Surgeon: Wilber Lin MD;  [facility]     BRONCHOSCOPY, DILATE BRONCHUS, STENT BRONCHUS, COMBINED N/A 3/7/2019    Procedure: Flexible and Rigid Bronchoscopy, Bronchial Washing, Balloon Dilation;  Surgeon: Wilber Lin MD;  [facility]     BRONCHOSCOPY, DILATE BRONCHUS, STENT BRONCHUS, COMBINED N/A 6/6/2019    Procedure: Rigid and Flexible Bronchoscopy, Balloon Dilation;  Surgeon: Wilber Lin MD;  [facility]     BRONCHOSCOPY, DILATE BRONCHUS, STENT BRONCHUS, COMBINED N/A 10/11/2019    Procedure: Flexible and Rigid Bronchoscopy, Balloon Dilation, Bronchoalveolar Lagave;  Surgeon: Wilber Lin MD;  [facility]     BRONCHOSCOPY, DILATE BRONCHUS, STENT BRONCHUS, COMBINED N/A 2/19/2021    Procedure: BRONCHOSCOPY, flexible, airway dilation, and bronchial wash;  Surgeon: Wilber Lin MD;  [facility]     BRONCHOSCOPY, DILATE BRONCHUS, STENT BRONCHUS, COMBINED N/A 4/9/2021    Procedure: BRONCHOSCOPY, flexible and rigid, Airway suctioning;  Surgeon: Mati Norris MD;  Location:  OR     CV RIGHT HEART CATH MEASUREMENTS RECORDED N/A 3/10/2020    Procedure: CV RIGHT HEART CATH;  Surgeon: Wai Garcia MD;  Location:  HEART CARDIAC CATH LAB     ENT  SURGERY      tonsillectomy as a child     ESOPHAGOSCOPY, GASTROSCOPY, DUODENOSCOPY (EGD), COMBINED N/A 10/29/2018    Procedure: COMBINED ESOPHAGOSCOPY, GASTROSCOPY, DUODENOSCOPY (EGD) with biopsies and polypectomy;  Surgeon: Chente Bloom MD;  [facility]     INSERT EXTRACORPORAL MEMBRANE OXYGENATOR ADULT (OUTSIDE OR) N/A 2/27/2018    Procedure: INSERT EXTRACORPORAL MEMBRANE OXYGENATOR ADULT (OUTSIDE OR);  INSERT EXTRACORPORAL MEMBRANE OXYGENATOR ADULT (OUTSIDE OR) ;  Surgeon: Toby Hernandez MD;  [facility]     IR CVC TUNNEL PLACEMENT > 5 YRS OF AGE  10/25/2019     IR DIALYSIS FISTULOGRAM LEFT  3/2/2021     IR DIALYSIS MECH THROMB, PTA  3/2/2021     IR FLUORO 0-1 HOUR  5/7/2021     IR GASTRO JEJUNOSTOMY TUBE PLACEMENT  2/16/2021     IR PARACENTESIS  1/8/2020     IR THORACENTESIS  9/13/2019     IR TRANSCATHETER BIOPSY  1/8/2020     LASER CO2 BRONCHOSCOPY N/A 4/30/2021    Procedure: Flexible and Rigid Bronchoscopy and Tissue Debulking with CO2 Laser Assistance;  Surgeon: Mati Norris MD;  [facility]     LASER CO2 BRONCHOSCOPY N/A 6/11/2021    Procedure: BRONCHOSCOPY, Flexible and Rigid Bronchoscopy, Tissue Debulking with cryo Assistance, airway dilation,;  Surgeon: Mati Norris MD;  [facility]     LASER CO2 BRONCHOSCOPY N/A 9/16/2021    Procedure: BRONCHOSCOPY, flexible and rigid, CO2 Laser Debulking;  Surgeon: Mati Norris MD;  [facility]     LASER CO2 BRONCHOSCOPY N/A 2/11/2022    Procedure: flexible, rigid bronchoscopy, tissue debulking, airway dilation, co2 laser, bronchoalveolar lavage;  Surgeon: Juana Baugh MD;  [facility]     no prior surgery       REMOVE EXTRACORPORAL MEMBRANE OXYGENATOR ADULT N/A 3/3/2018    Procedure: REMOVE EXTRACORPORAL MEMBRANE OXYGENATOR ADULT;  Removal of Right Femoral Venous and Right Axillary Arterial Extracorporeal Membrane Oxygenator;  Surgeon: Toby Hernandez MD;  [facility]     TRANSPLANT LUNG  RECIPIENT SINGLE X2 Bilateral 3/1/2018    Procedure: TRANSPLANT LUNG RECIPIENT SINGLE X2;  Median Sternotomy, Extracorporeal Membrane Oxygenator, bilateral sequential lung transplant;  Surgeon: Toby Hernandez MD;  Location:  OR     Family History   Problem Relation Age of Onset     Hypertension Mother      Arthritis Mother      Pancreatic Cancer Father      Diabetes Father      Social History     Socioeconomic History     Marital status:      Spouse name: Not on file     Number of children: Not on file     Years of education: Not on file     Highest education level: Not on file   Occupational History     Not on file   Tobacco Use     Smoking status: Never Smoker     Smokeless tobacco: Never Used   Substance and Sexual Activity     Alcohol use: No     Alcohol/week: 1.0 standard drink     Types: 1 Glasses of wine per week     Drug use: No     Sexual activity: Not on file   Other Topics Concern     Parent/sibling w/ CABG, MI or angioplasty before 65F 55M? No   Social History Narrative    3/6/2019 - Lives with . Has three daughters. Four grandchildren (two ). No pets. Travelled previously to [facility]. Has visited [place] several times.      Social Determinants of Health     Financial Resource Strain: Not on file   Food Insecurity: Not on file   Transportation Needs: Not on file   Physical Activity: Not on file   Stress: Not on file   Social Connections: Not on file   Intimate Partner Violence: Not on file   Housing Stability: Not on file       ROS:   CONSTITUTIONAL:  No fevers or chills  EYES: negative for icterus  ENT:  negative for hearing loss, tinnitus and sore throat  RESPIRATORY:  negative for cough, sputum, dyspnea  CARDIOVASCULAR:  negative for chest pain Fatigue  Renal Insufficiency  GASTROINTESTINAL:  negative for nausea, vomiting, diarrhea or constipation  GENITOURINARY:  negative for incontinence, dysuria, bladder emptying problems  HEME:  No easy  bruising  INTEGUMENT:  negative for rash and pruritus  NEURO:  Negative for headache, seizure disorder  Allergies:   No Known Allergies  Medications:  Prescription Medications as of 7/26/2022       Rx Number Disp Refills Start End Last Dispensed Date Next Fill Date Owning Pharmacy    acetaminophen (TYLENOL) 325 MG tablet  60 tablet  2/25/2021        Sig: Take 1 tablet (325 mg) by mouth every 4 hours as needed for mild pain or fever    Class: No Print Out    Route: Oral    albuterol (PROVENTIL) (2.5 MG/3ML) 0.083% neb solution  360 mL 4 2/10/2022    [facility] #[id number] - [place], [address] AT [facility]    Sig: Take 1 vial (2.5 mg) by nebulization daily    Class: Historical    Route: Nebulization    azaTHIOprine (IMURAN) 50 MG tablet  45 tablet 3 3/3/2022    [id number] - [place] AT [facility] & [facility]    Sig: Take 0.5 tablets (25 mg) by mouth daily    Class: E-Prescribe    Notes to Pharmacy: TXP DT 3/1/2018 (Lung) TXP Dischg DT  DX Lung replaced by transplant Z94.2 TX Center [facility] ([place], [place])    Route: Oral    budesonide (PULMICORT) 0.5 MG/2ML neb solution  60 mL 11 2/10/2022    [facility] #[id number] - [place] AT [facility] & [facility]    Sig: Take 2 mLs (0.5 mg) by nebulization daily    Class: Historical    Route: Nebulization    calcium acetate (PHOSLO) 667 MG CAPS capsule    5/26/2022    [facility] #[id number] - [place] AT [facility]    Sig: Take 1 capsule (667 mg) by mouth 3 times daily (with meals) (largest meal)    Class: Historical    Route: Oral    Magnesium Cl-Calcium Carbonate (SLOW-MAG) 71.5-119 MG TBEC  90 tablet 3 4/8/2021    [id number] - [address] AT [facility] & [facility]    Sig: Take 3 tablets by mouth four times a week Take on non dialysis days T/Th/Sa/Su     Class: Historical    Route: Oral    metoprolol tartrate (LOPRESSOR) 25 MG tablet  60 tablet 11 2022    [facility][id number] - [place] AT [facility]    Si tablet (25 mg) by Oral or Feeding Tube route 2 times daily    Class: E-Prescribe    Route: Oral or Feeding Tube    multivitamin RENAL (RENAVITE RX/NEPHROVITE) 1 MG tablet  30 tablet 11 2022    [facility] #[id number] - [address] AT [facility]    Sig: Take 1 tablet by mouth daily    Class: E-Prescribe    Route: Oral    pantoprazole (PROTONIX) 40 MG EC tablet  30 tablet 11 3/1/2022    [facility] #[id number] - [address] AT [address]    Sig: Take 1 tablet (40 mg) by mouth every morning (before breakfast)    Class: E-Prescribe    Route: Oral    posaconazole (NOXAFIL) 100 MG EC tablet  90 tablet 11 11/15/2021    [facility] #[id number] - [place] AT [facility]    Sig: Take 3 tablets (300 mg) by mouth daily    Class: E-Prescribe    Route: Oral    No prior authorization was found for this prescription.    Found prior authorization for another prescription for the same medication: Closed - Other    predniSONE (DELTASONE) 2.5 MG tablet  30 tablet 11 5/10/2022    [id number] [place] AT [facility]    Sig: Take 1 tablet (2.5 mg) by mouth every evening    Class: E-Prescribe    Notes to Pharmacy: TXP DT 3/1/2018 (Lung) TXP Dischg DT  DX Lung replaced by transplant Z94.2 TX Center [facility] ([place], [place])    Route: Oral    predniSONE (DELTASONE) 5 MG tablet  30 tablet 11 5/10/2022    [facility] #[id number] - [place] AT [facility]    Sig: Take 1 tablet (5 mg) by mouth every morning    Class: E-Prescribe    Notes to Pharmacy: TXP DT 3/1/2018 (Lung) TXP Dischg DT  DX Lung  replaced by transplant Z94.2 TX [facility] ([place], [place])    Route: Oral    sulfamethoxazole-trimethoprim (BACTRIM) 400-80 MG tablet  13 tablet 11 5/4/2022    [facility] #[id number] - [place], [place] - [address] [facility] AT [facility]    Sig: Take 1 tablet by mouth Every Mon, Wed, Fri Morning    Class: E-Prescribe    Route: Oral    tacrolimus (GENERIC EQUIVALENT) 0.5 MG capsule  90 capsule 11 7/14/2022    [facility] #[id number] - [place], [place] - [address] [facility] AT [facility]    Sig: Take 0.5mg in am (1 cap) and 1mg in pm (2 caps)    Class: E-Prescribe    Notes to Pharmacy: TXP DT 3/1/2018 (Lung) TXP Dischg DT  DX Lung replaced by transplant Z94.2 [facility] ([place], [place])        Exam:     BP (!) 182/83   Pulse 77   Wt 58.7 kg (129 lb 6.4 oz)   LMP 06/07/2014 (Exact Date)   SpO2 100%   BMI 22.92 kg/m    Appearance: in no apparent distress.   Skin: normal  Eyes:  no redness or discharge.  Sclera anicteric  Head and Neck: Normal, no rashes or jaundice  Respiratory: easy respirations, no audible wheezing.  Abdomen: flat, No distention and No surgical scars   Extremeties: femoral 4+/4+, Edema, none  Neuro: gait abnormal due to knee pain   Psychiatric: Normal mood and affect    Diagnostics:   Recent Results (from the past 672 hour(s))   Tacrolimus by [employer] Mass Spectrometry    Collection Time: 07/11/22 10:08 AM   Result Value Ref Range    Tacrolimus by Tandem Mass Spectrometry 5.8 5.0 - 15.0 ug/L    Tacrolimus Last Dose Date 7/10/2022     Tacrolimus Last Dose Time 10:00 PM    CBC with Platelets & Differential    Collection Time: 07/11/22 10:12 AM   Result Value Ref Range    WBC Count (External) 6.6 4.5 - 11.0 10(3)/uL    RBC Count (External) 3.49 (L) 4.00 - 5.20 10(6)/uL    Hemoglobin (External) 10.9 (L) 12.0 - 16.0 g/dL    Hematocrit (External) 34.4 33.0 - 51.0 %    MCV (External) 98.6  80.0 - 100.0 fL    MCH (External) 31.2 26.0 - 34.0 pg    MCHC (External) 31.7 (L) 32.0 - 36.0 g/dL    RDW (External) 15.6 (H) 11.5 - 13.1 %    Platelet Count (External) 226 150 - 450 10(3)/uL    % Neutrophils (External) 75.3 35.0 - 77.0 %    % Lymphocytes (External) 11.2 (L) 24.0 - 44.0 %    % Monocytes (External) 10.3 7.0 - 13.0 %    % Eosinophils (External) 1.8 0.0 - 3.0 %    % Basophils (External) 0.6 0.0 - 1.0 %    % Immature Granulocytes (External) 0.8 %    Absolute Neutrophils (External) 5.0 1.5 - 8.5 10(3)/uL    Absolute Lymphocytes (External) 0.7 (L) 1.1 - 5.0 10(3)/uL    Absolute Monocytes (External) 0.7 0.1 - 0.7 10(3)/uL    Absolute Eosinophils (External) 0.1 0.0 - 0.3 10(3)/uL    Absolute Basophils (External) 0.0 0.0 - 0.1 10(3)/uL    Absolute Immature Granulocytes (External) 0.05 10(3)/uL   Magnesium    Collection Time: 07/11/22 10:12 AM   Result Value Ref Range    Magnesium (External) 2.0 1.6 - 2.6 mg/dL   Phosphorus    Collection Time: 07/11/22 10:12 AM   Result Value Ref Range    Phosphorus (External) 4.8 (H) 2.3 - 4.7 mg/dL   Hepatic function panel    Collection Time: 07/11/22 10:12 AM   Result Value Ref Range    Protein Total (External) 7.5 6.4 - 8.3 g/dL    Albumin (External) 3.3 (L) 3.5 - 5.0 g/dL    AST (External) 28 5 - 34 U/L    ALT (External) 53 0 - 55 U/L    Alk Phosphatase (External) 373 (HH) 40 - 150 U/L    Bilirubin Total (External) 0.7 0.2 - 1.2 mg/dL   Basic metabolic panel    Collection Time: 07/11/22 10:12 AM   Result Value Ref Range    Sodium (External) 137 136 - 145 mmol/L    Potassium (External) 4.6 3.5 - 5.1 mmol/L    Chloride (External) (External) 100 98 - 107 mmol/L    CO2 (External) 24 20 - 31 mmol/L    Anion Gap (External) 17.6 10.0 - 20.0 mmol/L    Creatinine (External) 5.80 (HH) 0.55 - 1.02 mg/dL    Urea Nitrogen (External) 73.3 (H) 7.0 - 26.0 mg/dL    BUN/Creatinine Ratio (External) 12.64 ratio    Calcium (External) 9.9 8.4 - 10.2 mg/dL    Glucose (External) 101 70 - 105  mg/dL    GFR Estimated (External) 8 (L) >=60 ml/min/1.73m2   CMV Quantitative, PCR    Collection Time: 07/11/22 10:12 AM    Specimen: Blood   Result Value Ref Range    CMV DNA Quant (External) <35 (A) Undetected IU/mL    Log IU/ML of CMVQNT (External) <1.54 log IU/mL     UNOS cPRA   Date Value Ref Range Status   05/01/2021 0  Final

## 2022-07-26 NOTE — LETTER
7/26/2022         RE: Kecia Blue  [address] [place] [address]        Dear Colleague,    Thank you for referring your patient, Kecia Blue, to the [facility]. Please see a copy of my visit note below.    Transplant Surgery Consult Note     Medical record number: [id number]  YOB: 1960,   Consult requested  for evaluation of kidney transplant candidacy.    Assessment and Recommendations:Ms. Blue appears to be a good candidate for kidney transplantation and has a good understanding of the risks and benefits of this approach to the management of renal failure. The following issues should be addressed prior to finalizing her transplant candidacy:     Transplant order: Ms. 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  Recommend he be considered as a candidate for kidney transplant.  Cardiology consult for cardiac risk stratification to be ordered: Yes.  Seeing them today  CT abdomen and pelvis without contrast to be ordered for assessment of vascular targets: No  Transplant listing labs ordered to include HLA, ABOx2, Cr, etc.  Dietician consult ordered: Yes  Social work consult ordered: Yes  Imaging reports reviewed:  CT from 2/2021:  IMPRESSION:   1. No evidence of intraabdominal abscess or infection.   2. Slightly improved aeration of the diffuse groundglass opacities,  irregular interlobular septal thickening and consolidative opacities  in the lower lobes.   3. Stable size of partially calcified cystic structure abutting the  medial pleura in the left lower lobe, consistent with patient's known  chronic empyema.  4. Cholelithiasis with mild gallbladder mucosal enhancement and trace  pericholecystic fluid, likely due to anasarca/third spacing rather  than acute cholecystitis.  5. Mild  degree of intramesenteric edema, soft tissue anasarca and  dependent pelvic fluid, likely due to third spacing.  6. Chronic appearing compression deformities of the T5 and T9  vertebral bodies. No suspicious osseous lesions.  7. Support devices as described in the report.  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  She agrees and would like to move forward with this first.  There is mention of liver disease but her platelets adequate without evidence of thrombocytopenia, her spleen does not appear enlarged, and her liver does not have a cirrhotic appearance on her CT from .  I am doubtful she has appreciable portal hypertension at this time. I did not appreciate ascites on exam however Dr. Denise mentions elevated portal pressures and SAAG>1.1.  Bx in  showed:  Liver, needle biopsy:   - Congestive hepatopathy with moderate zone 3 pericellular fibrosis (see   microscopic description)   We should have hepatology comment on kidney alone transplant but I suspect she should be fine to proceed.  They have not seen her in over a year.        The majority of our visit was spent in counselling, discussing the medical and surgical risks of kidney transplantation. We discussed approximate wait time and how that is influenced by issues such as blood type and sensitization (PRA) and access to a living donor. I contrasted potential waiting time for living vs  donor kidneys from  normal (0-85%) or higher (%) kidney donor profile index (KDPI) donors and their associated outcomes. I would not recommend this individual to consider kidneys from high KDPI donors. The reason for this decision is best summarized as: should  try to avoid delayed graft function. Potential surgical complications of kidney transplantation include bleeding, superficial or deep wound complications (infection, hernia, lymphocele), ureteral anastomotic failure (leak or stenosis), graft thrombosis, need for reoperation and other issues such as cardiac complications, pneumonia, deep venous thrombosis, pulmonary embolism, post transplant diabetes and death. The potential for recurrent disease or need for retransplantation was also addressed. We discussed the possible need for ureteral stent (and subsequent removal), and the utility of protocol biopsy and laboratory studies to evaluate for rejection or recurrent disease. We discussed the risk of graft rejection, our center's average graft and patient survival rates, immunosuppression protocols, as well as the potential opportunity to participate in clinical trials.  We also discussed the average length of stay, recovery process, and posttransplant lab and monitoring protocol.  I emphasized the need for strict immunosuppression medication adherence and the potential for complications of immunosuppression such as skin cancer or lymphoma, as well as a very low but not zero risk of donor-derived disease transmission risks (infection, cancer). Ms. Blue asked good questions and her candidacy will be reviewed at our Multidisciplinary Selection Committee. Thank you for the opportunity to participate in Ms. Blue's care.      Total time: 60 minutes        Jacqueline Lehman MD FACS  Assistant Professor of Surgery  Director, Living Kidney Donor Program.    ---------------------------------------------------------------------------------------------------    HPI: Ms. 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 The patient is non-diabetic.   Was supposed to knee replacement in April      The patient is on dialysis.    Has potential kidney donors:  No.  Interested in participation in paired  exchange if a donor is willing: Yes     The patient has the following pertinent history:       No    Yes  Dialysis:    []      [x] via:       Blood Transfusion                  []      [x]  Number of units:   Most recently:2021   Pregnancy:    []      [x] Number:   4    Previous Transplant:  []      [x] Details:  Lung 2018  Cancer    [x]      [] Comment:   Kidney stones   [x]      [] Comment:      Recurrent infections  [x]      []  Type:                  Bladder dysfunction  [x]      [] Cause:    Claudication   [x]      [] Distance:    Previous Amputation  [x]      [] Cause:     Chronic anticoagulation  [x]      [] Indication:   Jew  [x]      []      Past Medical History:   Diagnosis Date     Acute on chronic respiratory failure with hypoxia (H) 02/21/2018     Anisocoria      Antisynthetase syndrome (H) 2014     Anxiety      Aspergillus (H)      Aspergillus pneumonia (H) 11/20/2020     Benign essential hypertension      C. difficile colitis      Cytomegalovirus (CMV) viremia (H)      Dermatomyositis (H)      Dysplasia of cervix, low grade (ESTRADA 1)      EBV (Franklin-Barr virus) viremia      ESRD (end stage renal disease) on dialysis (H)      ILD (interstitial lung disease) (H)      Lung replaced by transplant (H)      Osteopenia      Paroxysmal atrial fibrillation (H)      Pneumocystis jiroveci pneumonia (H)      PONV (postoperative nausea and vomiting)      Post-menopause      Pulmonary hypertension (H)      Raynaud's disease      Seronegative rheumatoid arthritis (H)      Past Surgical History:   Procedure Laterality Date     BRONCHOSCOPY (RIGID OR FLEXIBLE), DIAGNOSTIC N/A 4/10/2018    Procedure: COMBINED BRONCHOSCOPY (RIGID OR FLEXIBLE), LAVAGE;;  Surgeon: Mariposa Donohue MD;  [facility]     BRONCHOSCOPY (RIGID OR FLEXIBLE), DIAGNOSTIC N/A 12/23/2020    Procedure: BRONCHOSCOPY, WITH BRONCHOALVEOLAR LAVAGE;  Surgeon: Uri Bass MD;  [facility]     BRONCHOSCOPY (RIGID OR  FLEXIBLE), DIAGNOSTIC N/A 5/26/2022    Procedure: BRONCHOSCOPY, WITH BRONCHOALVEOLAR LAVAGE;  Surgeon: Uri Bass MD;  [facility]     BRONCHOSCOPY (RIGID OR FLEXIBLE), DILATE BRONCHUS / TRACHEA N/A 10/11/2018    Procedure: BRONCHOSCOPY (RIGID OR FLEXIBLE), DILATE BRONCHUS / TRACHEA;  Flexible And Rigid Bronchoscopy and Dilation;  Surgeon: Wilber Lin MD;  [facility]     BRONCHOSCOPY FLEXIBLE N/A 3/13/2018    Procedure: BRONCHOSCOPY FLEXIBLE;  Flexible Bronchoscopy ;  Surgeon: Gissell Sanchez MD;  [facility]     BRONCHOSCOPY FLEXIBLE N/A 5/9/2018    Procedure: BRONCHOSCOPY FLEXIBLE;;  Surgeon: Wilber Lin MD;  [facility]     BRONCHOSCOPY FLEXIBLE AND RIGID N/A 9/10/2018    Procedure: BRONCHOSCOPY FLEXIBLE AND RIGID;  Flexible and Rigid Bronchoscopy with Balloon Dilation, tissue debulking with cryo, and Right mainstem bronchus stent placement;  Surgeon: Wilber Lin MD;  [facility]     BRONCHOSCOPY RIGID N/A 6/6/2018    Procedure: BRONCHOSCOPY RIGID;;  Surgeon: Lopez Macias MD;  [facility]     BRONCHOSCOPY, DILATE BRONCHUS, STENT BRONCHUS, COMBINED N/A 6/11/2018    Procedure: COMBINED BRONCHOSCOPY, DILATE BRONCHUS, STENT BRONCHUS;  Flexible Bronchoscopy, Balloon Dilation, Bronchial Washings;  Surgeon: Wilber Lin MD;  [facility]     BRONCHOSCOPY, DILATE BRONCHUS, STENT BRONCHUS, COMBINED Right 7/10/2018    Procedure: COMBINED BRONCHOSCOPY, DILATE BRONCHUS, STENT BRONCHUS;  Flexible Bronchoscopy, Balloon Dilation, Bronchial Washings  ;  Surgeon: Wilber iLn MD;  [facility]     BRONCHOSCOPY, DILATE BRONCHUS, STENT BRONCHUS, COMBINED N/A 8/2/2018    Procedure: COMBINED BRONCHOSCOPY, DILATE BRONCHUS, STENT BRONCHUS;  Flexible Bronchoscopy, Bronchial Washings, Balloon Dilation;  Surgeon: Wilber Lin MD;  [facility]     BRONCHOSCOPY, DILATE BRONCHUS, STENT BRONCHUS, COMBINED N/A 8/20/2018     Procedure: COMBINED BRONCHOSCOPY, DILATE BRONCHUS, STENT BRONCHUS;  Flexible Bronchoscopy, Balloon Dilation;  Surgeon: Wilber Lin MD;  [facility]     BRONCHOSCOPY, DILATE BRONCHUS, STENT BRONCHUS, COMBINED N/A 10/29/2018    Procedure: Flexible Bronchoscopy, Balloon Dilation, Stent Revision, Airway Examination And Therapeutic Suctioning, Cyro Tumor Debulking;  Surgeon: Wilber Lin MD;  [facility]     BRONCHOSCOPY, DILATE BRONCHUS, STENT BRONCHUS, COMBINED N/A 11/20/2018    Procedure: Rigid Bronchoscopy, Stent Removal and dilitation;  Surgeon: Wilber Lin MD;  [facility]     BRONCHOSCOPY, DILATE BRONCHUS, STENT BRONCHUS, COMBINED N/A 12/14/2018    Procedure: Flexible And Rigid Bronchoscopy, Balloon Dilation, Bronchial Washing;  Surgeon: Wilber Lin MD;  [facility]     BRONCHOSCOPY, DILATE BRONCHUS, STENT BRONCHUS, COMBINED N/A 1/17/2019    Procedure: Flexible And Rigid Bronchoscopy, Balloon Dilation.;  Surgeon: Wilber Lin MD;  [facility]     BRONCHOSCOPY, DILATE BRONCHUS, STENT BRONCHUS, COMBINED N/A 3/7/2019    Procedure: Flexible and Rigid Bronchoscopy, Bronchial Washing, Balloon Dilation;  Surgeon: Wilber Lin MD;  [facility]     BRONCHOSCOPY, DILATE BRONCHUS, STENT BRONCHUS, COMBINED N/A 6/6/2019    Procedure: Rigid and Flexible Bronchoscopy, Balloon Dilation;  Surgeon: Wilber Lin MD;  [facility]     BRONCHOSCOPY, DILATE BRONCHUS, STENT BRONCHUS, COMBINED N/A 10/11/2019    Procedure: Flexible and Rigid Bronchoscopy, Balloon Dilation, Bronchoalveolar Lagave;  Surgeon: Wilber Lin MD;  [facility]     BRONCHOSCOPY, DILATE BRONCHUS, STENT BRONCHUS, COMBINED N/A 2/19/2021    Procedure: BRONCHOSCOPY, flexible, airway dilation, and bronchial wash;  Surgeon: Wilber Lin MD;  [facility]     BRONCHOSCOPY, DILATE BRONCHUS, STENT BRONCHUS, COMBINED N/A 4/9/2021    Procedure:  BRONCHOSCOPY, flexible and rigid, Airway suctioning;  Surgeon: Mati Norris MD;  [facility]     CV RIGHT HEART CATH MEASUREMENTS RECORDED N/A 3/10/2020    Procedure: CV RIGHT HEART CATH;  Surgeon: Wai Garcia MD;  [facility]     ENT SURGERY      tonsillectomy as a child     ESOPHAGOSCOPY, GASTROSCOPY, DUODENOSCOPY (EGD), COMBINED N/A 10/29/2018    Procedure: COMBINED ESOPHAGOSCOPY, GASTROSCOPY, DUODENOSCOPY (EGD) with biopsies and polypectomy;  Surgeon: Chente Bloom MD;  [facility]     INSERT EXTRACORPORAL MEMBRANE OXYGENATOR ADULT (OUTSIDE OR) N/A 2/27/2018    Procedure: INSERT EXTRACORPORAL MEMBRANE OXYGENATOR ADULT (OUTSIDE OR);  INSERT EXTRACORPORAL MEMBRANE OXYGENATOR ADULT (OUTSIDE OR) ;  Surgeon: Toby Hernandez MD;  [facility]     IR CVC TUNNEL PLACEMENT > 5 YRS OF AGE  10/25/2019     IR DIALYSIS FISTULOGRAM LEFT  3/2/2021     IR DIALYSIS MECH THROMB, PTA  3/2/2021     IR FLUORO 0-1 HOUR  5/7/2021     IR GASTRO JEJUNOSTOMY TUBE PLACEMENT  2/16/2021     IR PARACENTESIS  1/8/2020     IR THORACENTESIS  9/13/2019     IR TRANSCATHETER BIOPSY  1/8/2020     LASER CO2 BRONCHOSCOPY N/A 4/30/2021    Procedure: Flexible and Rigid Bronchoscopy and Tissue Debulking with CO2 Laser Assistance;  Surgeon: Mati Norris MD;  [facility]     LASER CO2 BRONCHOSCOPY N/A 6/11/2021    Procedure: BRONCHOSCOPY, Flexible and Rigid Bronchoscopy, Tissue Debulking with cryo Assistance, airway dilation,;  Surgeon: Mati Norris MD;  [facility]     LASER CO2 BRONCHOSCOPY N/A 9/16/2021    Procedure: BRONCHOSCOPY, flexible and rigid, CO2 Laser Debulking;  Surgeon: Mati Norris MD;  [facility]     LASER CO2 BRONCHOSCOPY N/A 2/11/2022    Procedure: flexible, rigid bronchoscopy, tissue debulking, airway dilation, co2 laser, bronchoalveolar lavage;  Surgeon: Juana Baugh MD;  [facility]     no prior surgery       REMOVE EXTRACORPORAL  MEMBRANE OXYGENATOR ADULT N/A 3/3/2018    Procedure: REMOVE EXTRACORPORAL MEMBRANE OXYGENATOR ADULT;  Removal of Right Femoral Venous and Right Axillary Arterial Extracorporeal Membrane Oxygenator;  Surgeon: Toby Hernandez MD;  [facility]     TRANSPLANT LUNG RECIPIENT SINGLE X2 Bilateral 3/1/2018    Procedure: TRANSPLANT LUNG RECIPIENT SINGLE X2;  Median Sternotomy, Extracorporeal Membrane Oxygenator, bilateral sequential lung transplant;  Surgeon: Toby Hernandez MD;  [facility]     Family History   Problem Relation Age of Onset     Hypertension Mother      Arthritis Mother      Pancreatic Cancer Father      Diabetes Father      Social History     Socioeconomic History     Marital status:      Spouse name: Not on file     Number of children: Not on file     Years of education: Not on file     Highest education level: Not on file   Occupational History     Not on file   Tobacco Use     Smoking status: Never Smoker     Smokeless tobacco: Never Used   Substance and Sexual Activity     Alcohol use: No     Alcohol/week: 1.0 standard drink     Types: 1 Glasses of wine per week     Drug use: No     Sexual activity: Not on file   Other Topics Concern     Parent/sibling w/ CABG, MI or angioplasty before 65F 55M? No   Social History Narrative    3/6/2019 - Lives with . Has three daughters. Four grandchildren (two ). No pets. Travelled previously to [place]. Has visited [place] several times.      Social Determinants of Health     Financial Resource Strain: Not on file   Food Insecurity: Not on file   Transportation Needs: Not on file   Physical Activity: Not on file   Stress: Not on file   Social Connections: Not on file   Intimate Partner Violence: Not on file   Housing Stability: Not on file       ROS:   CONSTITUTIONAL:  No fevers or chills  EYES: negative for icterus  ENT:  negative for hearing loss, tinnitus and sore throat  RESPIRATORY:  negative for cough,  sputum, dyspnea  CARDIOVASCULAR:  negative for chest pain Fatigue  Renal Insufficiency  GASTROINTESTINAL:  negative for nausea, vomiting, diarrhea or constipation  GENITOURINARY:  negative for incontinence, dysuria, bladder emptying problems  HEME:  No easy bruising  INTEGUMENT:  negative for rash and pruritus  NEURO:  Negative for headache, seizure disorder  Allergies:   No Known Allergies  Medications:  Prescription Medications as of 7/26/2022       Rx Number Disp Refills Start End Last Dispensed Date Next Fill Date Owning Pharmacy    acetaminophen (TYLENOL) 325 MG tablet  60 tablet  2/25/2021        Sig: Take 1 tablet (325 mg) by mouth every 4 hours as needed for mild pain or fever    Class: No Print Out    Route: Oral    albuterol (PROVENTIL) (2.5 MG/3ML) 0.083% neb solution  360 mL 4 2/10/2022    [facility] #[id number] - [address] & [facility]    Sig: Take 1 vial (2.5 mg) by nebulization daily    Class: Historical    Route: Nebulization    azaTHIOprine (IMURAN) 50 MG tablet  45 tablet 3 3/3/2022    [facility] #[id number] - [address] & [facility]    Sig: Take 0.5 tablets (25 mg) by mouth daily    Class: E-Prescribe    Notes to Pharmacy: TXP DT 3/1/2018 (Lung) TXP Dischg DT  DX Lung replaced by transplant Z94.2 TX [facility] ([place], [place])    Route: Oral    budesonide (PULMICORT) 0.5 MG/2ML neb solution  60 mL 11 2/10/2022    [facility][id number] - [address] & [facility]    Sig: Take 2 mLs (0.5 mg) by nebulization daily    Class: Historical    Route: Nebulization    calcium acetate (PHOSLO) 667 MG CAPS capsule    5/26/2022    [facility] #[id number] - [address] & [facility]    Sig: Take 1 capsule (667 mg) by mouth 3 times daily (with meals) (largest meal)    Class: Historical    Route: Oral    Magnesium  Cl-Calcium Carbonate (SLOW-MAG) 71.5-119 MG TBEC  90 tablet 3 2021    [facility] #[id number] - [address] AT [facility] & [facility]    Sig: Take 3 tablets by mouth four times a week Take on non dialysis days ///    Class: Historical    Route: Oral    metoprolol tartrate (LOPRESSOR) 25 MG tablet  60 tablet 11 2022    [facility] #[id number] - [address] AT [facility] & [facility]    Si tablet (25 mg) by Oral or Feeding Tube route 2 times daily    Class: E-Prescribe    Route: Oral or Feeding Tube    multivitamin RENAL (RENAVITE RX/NEPHROVITE) 1 MG tablet  30 tablet 11 2022    [facility] #[id number] - [address] AT [facility] & [facility]    Sig: Take 1 tablet by mouth daily    Class: E-Prescribe    Route: Oral    pantoprazole (PROTONIX) 40 MG EC tablet  30 tablet 11 3/1/2022    [facility] #[id number] - [address] AT [facility] & [facility]    Sig: Take 1 tablet (40 mg) by mouth every morning (before breakfast)    Class: E-Prescribe    Route: Oral    posaconazole (NOXAFIL) 100 MG EC tablet  90 tablet 11 11/15/2021    [facility] #[id number] - [address] & [facility]    Sig: Take 3 tablets (300 mg) by mouth daily    Class: E-Prescribe    Route: Oral    No prior authorization was found for this prescription.    Found prior authorization for another prescription for the same medication: Closed - Other    predniSONE (DELTASONE) 2.5 MG tablet  30 tablet 11 5/10/2022    [facility] #[id number] - [address] AT [facility] & [facility]    Sig: Take 1 tablet (2.5 mg) by mouth every evening    Class: E-Prescribe    Notes to Pharmacy: TXP DT 3/1/2018 (Lung) TXP Dischg DT  DX Lung replaced by transplant Z94.2 [facility] [place] ([place], [place])    Route: Oral    predniSONE (DELTASONE) 5 MG tablet  30  tablet 11 5/10/2022    [id number] - [place] [place] - [address] [facility] AT [facility] & [facility]    Sig: Take 1 tablet (5 mg) by mouth every morning    Class: E-Prescribe    Notes to Pharmacy: TXP DT 3/1/2018 (Lung) TXP Dischg DT  DX Lung replaced by transplant Z94.2 TX [facility] ([place], [place])    Route: Oral    sulfamethoxazole-trimethoprim (BACTRIM) 400-80 MG tablet  13 tablet 11 5/4/2022    [facility] #[id number] - [place], [place] - [address] [facility] AT [facility] & [facility]    Sig: Take 1 tablet by mouth Every Mon, Wed, Fri Morning    Class: E-Prescribe    Route: Oral    tacrolimus (GENERIC EQUIVALENT) 0.5 MG capsule  90 capsule 11 7/14/2022    [facility] #[id number] - [place], [place] - [address] [facility] AT [facility] & [facility]    Sig: Take 0.5mg in am (1 cap) and 1mg in pm (2 caps)    Class: E-Prescribe    Notes to Pharmacy: TXP DT 3/1/2018 (Lung) TXP Dischg DT  DX Lung replaced by transplant Z94.2 TX [facility] ([place], [place])        Exam:     BP (!) 182/83   Pulse 77   Wt 58.7 kg (129 lb 6.4 oz)   LMP 06/07/2014 (Exact Date)   SpO2 100%   BMI 22.92 kg/m    Appearance: in no apparent distress.   Skin: normal  Eyes:  no redness or discharge.  Sclera anicteric  Head and Neck: Normal, no rashes or jaundice  Respiratory: easy respirations, no audible wheezing.  Abdomen: flat, No distention and No surgical scars   Extremeties: femoral 4+/4+, Edema, none  Neuro: gait abnormal due to knee pain   Psychiatric: Normal mood and affect    Diagnostics:   Recent Results (from the past 672 hour(s))   Tacrolimus by [employer] Mass Spectrometry    Collection Time: 07/11/22 10:08 AM   Result Value Ref Range    Tacrolimus by [employer] Mass Spectrometry 5.8 5.0 - 15.0 ug/L    Tacrolimus Last Dose Date 7/10/2022     Tacrolimus Last Dose Time 10:00 PM    CBC with Platelets & Differential    Collection Time: 07/11/22  10:12 AM   Result Value Ref Range    WBC Count (External) 6.6 4.5 - 11.0 10(3)/uL    RBC Count (External) 3.49 (L) 4.00 - 5.20 10(6)/uL    Hemoglobin (External) 10.9 (L) 12.0 - 16.0 g/dL    Hematocrit (External) 34.4 33.0 - 51.0 %    MCV (External) 98.6 80.0 - 100.0 fL    MCH (External) 31.2 26.0 - 34.0 pg    MCHC (External) 31.7 (L) 32.0 - 36.0 g/dL    RDW (External) 15.6 (H) 11.5 - 13.1 %    Platelet Count (External) 226 150 - 450 10(3)/uL    % Neutrophils (External) 75.3 35.0 - 77.0 %    % Lymphocytes (External) 11.2 (L) 24.0 - 44.0 %    % Monocytes (External) 10.3 7.0 - 13.0 %    % Eosinophils (External) 1.8 0.0 - 3.0 %    % Basophils (External) 0.6 0.0 - 1.0 %    % Immature Granulocytes (External) 0.8 %    Absolute Neutrophils (External) 5.0 1.5 - 8.5 10(3)/uL    Absolute Lymphocytes (External) 0.7 (L) 1.1 - 5.0 10(3)/uL    Absolute Monocytes (External) 0.7 0.1 - 0.7 10(3)/uL    Absolute Eosinophils (External) 0.1 0.0 - 0.3 10(3)/uL    Absolute Basophils (External) 0.0 0.0 - 0.1 10(3)/uL    Absolute Immature Granulocytes (External) 0.05 10(3)/uL   Magnesium    Collection Time: 07/11/22 10:12 AM   Result Value Ref Range    Magnesium (External) 2.0 1.6 - 2.6 mg/dL   Phosphorus    Collection Time: 07/11/22 10:12 AM   Result Value Ref Range    Phosphorus (External) 4.8 (H) 2.3 - 4.7 mg/dL   Hepatic function panel    Collection Time: 07/11/22 10:12 AM   Result Value Ref Range    Protein Total (External) 7.5 6.4 - 8.3 g/dL    Albumin (External) 3.3 (L) 3.5 - 5.0 g/dL    AST (External) 28 5 - 34 U/L    ALT (External) 53 0 - 55 U/L    Alk Phosphatase (External) 373 (HH) 40 - 150 U/L    Bilirubin Total (External) 0.7 0.2 - 1.2 mg/dL   Basic metabolic panel    Collection Time: 07/11/22 10:12 AM   Result Value Ref Range    Sodium (External) 137 136 - 145 mmol/L    Potassium (External) 4.6 3.5 - 5.1 mmol/L    Chloride (External) (External) 100 98 - 107 mmol/L    CO2 (External) 24 20 - 31 mmol/L    Anion Gap (External) 17.6  10.0 - 20.0 mmol/L    Creatinine (External) 5.80 (HH) 0.55 - 1.02 mg/dL    Urea Nitrogen (External) 73.3 (H) 7.0 - 26.0 mg/dL    BUN/Creatinine Ratio (External) 12.64 ratio    Calcium (External) 9.9 8.4 - 10.2 mg/dL    Glucose (External) 101 70 - 105 mg/dL    GFR Estimated (External) 8 (L) >=60 ml/min/1.73m2   CMV Quantitative, PCR    Collection Time: 07/11/22 10:12 AM    Specimen: Blood   Result Value Ref Range    CMV DNA Quant (External) <35 (A) Undetected IU/mL    Log IU/ML of CMVQNT (External) <1.54 log IU/mL     UNOS cPRA   Date Value Ref Range Status   05/01/2021 0  Final       Again, thank you for allowing me to participate in the care of your patient.        Sincerely,        Jacqueline Lehman MD, MD

## 2022-07-26 NOTE — NURSING NOTE
Chief Complaint   Patient presents with     New Patient      kidney transplant eval        Vitals were taken and medications reconciled.    Edin Arzola, EMT  12:49 PM

## 2022-08-01 ENCOUNTER — VIRTUAL VISIT (OUTPATIENT)
Dept: INFECTIOUS DISEASES | Facility: CLINIC | Age: 62
End: 2022-08-01
Attending: STUDENT IN AN ORGANIZED HEALTH CARE EDUCATION/TRAINING PROGRAM
Payer: MEDICARE

## 2022-08-01 DIAGNOSIS — J86.9 EMPYEMA OF LUNG (H): ICD-10-CM

## 2022-08-01 DIAGNOSIS — Z79.2 ADMINISTRATION OF LONG-TERM PROPHYLACTIC ANTIBIOTICS: ICD-10-CM

## 2022-08-01 DIAGNOSIS — Z76.82 ORGAN TRANSPLANT CANDIDATE: ICD-10-CM

## 2022-08-01 DIAGNOSIS — N18.6 ESRD (END STAGE RENAL DISEASE) (H): ICD-10-CM

## 2022-08-01 DIAGNOSIS — Z23 NEED FOR VACCINATION: ICD-10-CM

## 2022-08-01 DIAGNOSIS — Z94.2 LUNG TRANSPLANT STATUS, BILATERAL (H): ICD-10-CM

## 2022-08-01 DIAGNOSIS — B44.89 INFECTION BY ASPERGILLUS FUMIGATUS (H): Primary | ICD-10-CM

## 2022-08-01 PROCEDURE — 99213 OFFICE O/P EST LOW 20 MIN: CPT | Mod: 95 | Performed by: STUDENT IN AN ORGANIZED HEALTH CARE EDUCATION/TRAINING PROGRAM

## 2022-08-01 PROCEDURE — G0463 HOSPITAL OUTPT CLINIC VISIT: HCPCS | Mod: PN,RTG | Performed by: STUDENT IN AN ORGANIZED HEALTH CARE EDUCATION/TRAINING PROGRAM

## 2022-08-01 NOTE — PROGRESS NOTES
Kecia is a 61 year old who is being evaluated via a billable video visit.      How would you like to obtain your AVS? MyChart  If the video visit is dropped, the invitation should be resent by: Text to cell phone: [phone]  Will anyone else be joining your video visit? No      Video Start Time: 3.12 pm   Video-Visit Details    Type of service:  Video Visit    Video End Time:3:23 PM    Originating Location (pt. Location): hospital, had outpatient bronch and is in recovery     Distant Location (provider location):  [facility] [place]     Platform used for Video Visit: Fab     Total time including chart review, care-coordination and documentation time on the date of encounter - 27 mins    _______________________________________________________________________________________________________  [facility]  Transplant Infectious Disease Progress Note     Patient:  Kecia Blue, Date of birth 1960, Medical record number [id number]  Date of Visit:  08/01/2022            Assessment and Recommendations:   Recommendations:  --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 This will most likely be for life   - periodic posaconazole trough levels - every 6 months   - elevated to normal alk phos (twice normal) with normal AST/ALT - could be from posa - no intervention as mild  --Cont bactrim for PCP secondary prophylaxis.This will be for life  - Check EBV VL every 6-12 months. Currently neg   - with respect to Pursuing a kidney transplant - she is at risk for worsening aspergillus infection with heightened immune suppression soon after kidney transplant especially in lieu of liver issues. However it is hard to quantify that risk. There has been no recurrent pleural effusion since 9/2021. She is on posaconazole. She is willing to take the risk. Therefore can proceed with kidney transplant from an ID standpoint. Communicated  with lung transplant team.   - recommend covid vaccine 4 th dose. She says she will get it next month when she has an appt with the lung transplant team     No follow up scheduled at this time. Patient knows to contact me if issues arise.       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 s/p drainage and vori (S to both vori and posa) in 2019, Calcified mass in left pleural cavity s/p biopsy Oct 2020 with aspergillus on cx and path (S to both vori and posa) vori changed to posaconazole. S/p left pleural effusion drainage April 2021 - exudative, fungal elements seen on KOH but fungal cx neg. Posa levels good and no recurrence of pleural effusion in Sep 2021 CT chest.     I suspect the left pleural effusion is reactive to the calcified aspergillus elements there along with fluid disturbance related to dialysis. I do not think this is failure of anti-fungal therapy or recurrence of Aspergillus infection. It seems Dr. Layton has had discussions with surgery for source control (removal of calcified mass), however, there is  concern for high morbidity with surgery and therefore it was decided to pursue long term posaconazole.     EBV viremia: elevated to 960k in 10/2021. Suspect due to recent hospitalization, health stress. Decreased to 135k 2/2022. Neg 6/27/22. Can check every 6-12 months.     Candidacy for kidney transplant: This is a very difficult call. She is at risk for aspergillus  infection worsening with heightened immune suppression soon after kidney transplant but its hard to know how much of a risk this will be. Also people with liver disease have worse fungal infection severity. Patient is ok with taking this risk. Ok to proceed with transplant from an ID standpoint.                Interval History:   Continues to be on posaconazole 300 mg po daily, tolerating it well.   posa level 5/26 2 - most likely trough. Alk phos is normal now   On Bactrim three times a week prophylaxis -  CBC ok    EBV neg 6/27/22    Got evusheld in May ?. Can get covid vaccine 4 th dose. She says she will get it next month when she has an appt with the lung transplant team   She feels well.    being worked up as living donor for kidney transplant       HPI: 10/2021   Patient is a 58 yo female with PMHx significant for ILD, seronegative rheumatoid arthritis and dermatomyositis s/p B/L lung transplantation 3/1/2018 (CMV D+/R+, EBV D+/R+). 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 Underwent CT guided biopsy on 10/8/19 which yielded purulent fluid--cx positive for aspergillus fumigatus. She underwent bronchoscopy with BAL on 10/11/19 which showed septate hyphae.  Notably, she had also grown Actinomyces from multiple BAL specimens in the past. She was started on voriconazole at the time.      Patient subsequently underwent repaet CT chest on 7/18/20 at OSH and was noted to have interval increased hypodense mass like region in medial aspect of left lower lung. As a  result, patient underwent CT guided lung tissue biopsy. Histopath showed acute inflammation, necrosis and myofibroblastic proliferation with focal fungal hyphae highlighted by GMS.  Cultures positive for Aspergillus fumigatus. Voriconazole was changed to posaconazole at that time     1/5/21- BAL Cx with stenotrophomonas maltophilia-treated with ceftazidime x 2 weeks     1/24/21-2/25/21 hospitalized with AHRF requiring intubation 2/2 PCP pneumonia/ARDS(bactrim prophylaxis had been stopped due to side effects) with subsequent trach on 2/12/21. She was discharged to the acute rehab and subsequently went home on 3/5/21.     In April 2021 she was found to have a left pleural effusion and hence had it drained with a chest tube x 1 week- ampho beads were placed. Pleurodesis was planned but abandoned due to fungal elements on stain. Fluid was exudative, fungal elements were seen but fungal cx was negative including other cxs. Serum Asp GM qas neg, serum BDG continued to be positive. She had a follow up CT chest in Sep which did not show a recurrence of the left pleural effusion.     She continues on posaconazole 300 mg daily and levels have been good and she is tolerating it well. She has been on dialysis for the past 2 years and is wondering about a kidney transplant.     She also has a history of liver dysfunction with alk phos elevation, ascites and portal HTN, followed by hepatology, suggestive of congestive hepatopathy with moderate zone 3 pericellular fibrosis. The ascites improved with dialysis. She reports that it has been a long time since she had a paracentesis.     Recent admission in Sep 2021 for most likely fluid overload vs HCAP. Oct 2021 EBV elevated to 969k from 45k in Sep 2021.     Results for SARAI KILLIAN (MRN [id number]) as of 10/15/2021 21:46   Ref. Range 9/15/2021 09:15 10/12/2021 12:51   EBV DNA Copies/mL Latest Ref Range: <=0 copies/mL 45,122 (H) 969,411 (H)     Results for SARAI KILLIAN  (MRN [id number]) as of 10/15/2021 21:46   Ref. Range 12/9/2020 07:21 1/25/2021 12:59 2/22/2021 05:27 5/1/2021 06:54   EBV DNA Copies/mL Latest Ref Range: EBVNEG^EBV DNA Not Detected Copies/mL 10,686 (A) 5,619 (A) 75,709 (A) 38,186 (A)              Current Medications & Allergies:   Reviewed    No Known Allergies         Physical Exam:     Unable to examine due to virtual visit          Laboratory Data:     Inflammatory Markers    Recent Labs   Lab Test 10/07/19  1221 10/06/19  1444 09/13/19  0934 09/11/19  2325 08/22/19  0820   SED 93*  --  111* 102*  --    CRP  --  25.0* 58.0* 66.0* 47.0*       Immune Globulin Studies     Recent Labs   Lab Test 09/15/21  0915 01/31/21  0441 01/25/21  0406 10/27/19  0621 03/01/18  0356 02/19/18  0759   IGG 1,198 763  --  936 698 1,790*   IGM  --   --   --   --   --  502*   IGE  --   --  <2  --   --  <2   IGA  --   --   --   --   --  425*   IGG1  --   --   --   --   --  1,300*   IGG2  --   --   --   --   --  131*   IGG3  --   --   --   --   --  101   IGG4  --   --   --   --   --  1*       Metabolic Studies       Recent Labs   Lab Test 07/11/22  1012 06/27/22  1013 05/26/22  0750 02/11/22  1032 02/10/22  0919 09/29/21  1105 09/23/21  1144 09/23/21  0628 09/22/21  1008 09/20/21  2128 09/20/21  1903 09/16/21  0749 09/15/21  0915 05/05/21  0707 05/03/21  0640 10/22/19  1314 10/21/19  1752 08/13/19  0000 08/07/19  0959   NA  --   --  137  --  134   < >  --    < > 134   < > 132*  --  136   < > 132*   < > 133   < > 133   POTASSIUM  --   --  3.5  --  4.3   < >  --    < > 3.5   < > 5.3   < > 4.8   < > 5.5*   < > 5.0   < > 4.2   CHLORIDE 100 99 96   < > 94   < >  --    < > 103   < > 101  --  98   < > 100   < > 109   < > 98   CO2  --   --  32  --  30   < >  --    < > 22   < > 23  --  26   < > 23   < > 10*   < > 29   ANIONGAP  --   --  9  --  10   < >  --    < > 9   < > 8  --  12   < > 9   < > 13   < > 6   BUN  --   --  42*  --  44*   < >  --    < > 51*   < > 90*  --  79*   < > 85*   < > 66*   <  > 27   CR  --   --  3.98*  --  4.27*   < >  --    < > 4.46*   < > 5.85*  --  5.63*   < > 4.55*   < > 5.76*   < > 1.80*   GFRESTIMATED  --   --  12*  --  11*   < >  --    < > 10*   < > 7*  --  8*   < > 10*   < > 8*   < > 31*   GLC  --   --  110*   < > 157*   < >  --    < > 192*   < > 98   < > 152*   < > 129*   < > 138*   < > 119*   A1C  --   --  5.2  --   --   --   --   --   --   --   --   --  5.8*   < >  --    < >  --   --   --    CHELSEY  --   --  9.5  --  9.6   < >  --    < > 8.0*   < > 8.5  --  8.6   < > 7.8*   < > 8.0*   < > 9.8   PHOS  --   --   --   --   --   --   --   --   --   --   --   --   --   --  5.9*   < > 6.7*   < >  --    MAG  --   --  1.8  --  1.8   < >  --   --   --   --   --   --   --   --   --    < > 2.0   < > 2.2   LACT  --   --   --   --   --   --  1.5  --  0.7   < >  --   --   --   --   --    < >  --    < >  --    PCAL  --   --   --   --   --   --   --   --   --   --  0.21  --   --   --   --    < >  --    < >  --    FGTL  --   --   --   --   --   --   --   --   --   --   --   --  210  --   --    < >  --    < >  --    CKT  --   --   --   --   --   --   --   --   --   --   --   --   --   --   --   --  70  --  36    < > = values in this interval not displayed.       Hepatic Studies    Recent Labs   Lab Test 10/12/21  1251 09/21/21  1055 09/20/21  1903 09/15/21  0915 05/01/21  0654 01/27/21  0414 01/26/21  0425   BILITOTAL 0.8 1.2 1.1   < >  --    < > 0.8   DBIL 0.4*  --   --    < >  --    < > 0.6*   ALKPHOS 316* 324* 321*   < >  --    < > 184*   PROTTOTAL 7.8 6.7* 7.4   < > 7.2   < > 6.0*  6.0*   ALBUMIN 2.8* 2.6* 3.0*   < >  --    < > 2.0*   AST 24 22 22   < >  --    < > 11   ALT 52* 80* 98*   < >  --    < > 30   LDH  --   --   --   --  164  --  187    < > = values in this interval not displayed.     Hematology Studies      Recent Labs   Lab Test 05/26/22  0750 02/10/22  0920 09/23/21  0628 09/22/21  0610 09/21/21  1055 09/20/21  1903 04/29/21  1020 04/08/21  0722 02/27/21  0853 02/25/21  0542   WBC  5.6 7.4 13.9* 12.2* 11.5* 9.9   < > 6.3   < > 2.9*   ANEU  --   --   --   --   --   --   --  4.8  --  1.9   ALYM  --   --   --   --   --   --   --  0.7*  --  0.6*   SHERRI  --   --   --   --   --   --   --  0.8  --  0.4   AEOS  --   --   --   --   --   --   --  0.1  --  0.1   HGB 11.2* 11.9 10.4* 10.7* 10.3* 11.3*   < > 9.7*   < > 9.9*   HCT 36.2 37.0 33.0* 33.3* 31.9* 36.1   < > 29.9*   < > 32.0*    243 165 200 218 228   < > 178   < > 293    < > = values in this interval not displayed.       posaconazole level ok 9/2021, 5/2022    Microbiology:  CMV, EBV neg 7/2022 5/2021 pleural fluid KOH - pos fungal elements, fungal cx neg    10/2019 pleural fluid fungal cx   Aspergillus fumigatus    E-TEST   Amphotericin B 0.5 ug/mL No interpretation available    Comment: See comment... 1     Itraconazole 0.12 ug/mL No interpretation available    Micafungin <=0.06 ug/mL No interpretation available    Posaconazole <=0.06 ug/ml No interpretation available    Voriconazole 0.5 ug/mL No interpretation available      Lung tissue Fungal cx aug 2020    Aspergillus fumigatus     FUNGAL YEAST JAYME     Amphotericin B 1.0 ug/mL No interpretation available     Comment: See comment... 1      Itraconazole 0.25 ug/mL No interpretation available     Micafungin <=0.03 ug/mL No interpretation available     Posaconazole 0.12 ug/ml No interpretation available     Voriconazole 0.5 ug/mL No interpretation available      Imaging: personally reviewed   CXR 2/10/22  IMPRESSION:  1. Decreased retrocardiac opacities, likely representing  fibroatelectasis. Cannot exclude infectious component.  2. No new focal opacities, pleural effusion, or pneumothorax.  3. Mild bowel loop dilation in the limited upper abdomen.    CT chest 9/15/21  IMPRESSION: Small old partially calcified empyema paravertebral left  lower lung. Removal of previous thoracostomy tube. Unchanged  osteopenic T5 compression deformity. Bilateral lung transplantation.  Increased  opacities in right lower lung which may indicate worsening  inflammation rather than edema. Continued right-sided bronchial  anastomotic narrowing, unchanged from just over 4 months ago.  Cholelithiasis. Aortic atherosclerosis.    CT chest 5/4/21  1. Decreased size of the peripherally calcified chronic empyema within  the medial left lower lobe with left sided chest tube in place.  Retained amphotericin bead is within the collection.  2. Cardiomegaly with basilar predominance groundglass opacities and  interlobular septal thickening, compatible with pulmonary edema.  3. Postsurgical changes of bilateral lung transplant with unchanged  mild right bronchial anastomotic narrowing.    CT chest 4/8/21  1. Stable partially calcified pleural cystic structure/collection  along the medial right lower lobe, consistent with chronic  empyema/sequela of prior infection. Trace bilateral pleural effusions.  2. Cardiomegaly with basilar predominant patchy groundglass opacities  with diffuse interlobular septal thickening, consistent with pulmonary  edema.  3. Tree-in-bud nodular opacities predominantly involving the lingula,  concerning for infection.  4. Stable postsurgical changes of bilateral lung transplantation.  5. Cholelithiasis.  6. Splenomegaly.

## 2022-08-01 NOTE — LETTER
8/1/2022       RE: Kecia Blue  [address] [place] [address]     Dear Colleague,    Thank you for referring your patient, Kecia Blue, to the [facility] [place] at [facility]. Please see a copy of my visit note below.      Kecia is a 61 year old who is being evaluated via a billable video visit.      How would you like to obtain your AVS? MyChart  If the video visit is dropped, the invitation should be resent by: Text to cell phone: [phone]  Will anyone else be joining your video visit? No      Video Start Time: 3.12 pm   Video-Visit Details    Type of service:  Video Visit    Video End Time:3:23 PM    Originating Location (pt. Location): hospital, had outpatient bronch and is in recovery     Distant Location (provider location):  [facility]     Platform used for Video Visit: Keynoir     Total time including chart review, care-coordination and documentation time on the date of encounter - 27 mins    _______________________________________________________________________________________________________  [facility]  Transplant Infectious Disease Progress Note     Patient:  Kecia Blue, Date of birth 1960, Medical record number [id number]  Date of Visit:  08/01/2022            Assessment and Recommendations:   Recommendations:  --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 This will most likely be for life   - periodic posaconazole trough levels - every 6 months   - elevated to normal alk phos (twice normal) with normal AST/ALT - could be from posa - no intervention as mild  --Cont bactrim for PCP secondary prophylaxis.This will be for life  - Check EBV VL every 6-12 months. Currently neg   - with respect to Pursuing a kidney transplant - she is at risk for worsening aspergillus infection with  heightened immune suppression soon after kidney transplant especially in lieu of liver issues. However it is hard to quantify that risk. There has been no recurrent pleural effusion since 9/2021. She is on posaconazole. She is willing to take the risk. Therefore can proceed with kidney transplant from an ID standpoint. Communicated with lung transplant team.   - recommend covid vaccine 4 th dose. She says she will get it next month when she has an appt with the lung transplant team     No follow up scheduled at this time. Patient knows to contact me if issues arise.       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 s/p drainage and vori (S to both vori and posa) in 2019, Calcified mass in left pleural cavity s/p biopsy Oct 2020 with aspergillus on cx and path (S to both vori and posa) vori changed to posaconazole. S/p left pleural effusion drainage April 2021 - exudative, fungal elements seen on KOH but fungal cx neg. Posa levels good and no recurrence of pleural effusion in Sep 2021 CT chest.     I suspect the left pleural effusion is reactive to the calcified aspergillus elements there along with fluid disturbance related to dialysis. I do not think this is failure of anti-fungal therapy or recurrence of Aspergillus infection. It seems Dr. Layton has had discussions with surgery for source control (removal of calcified mass), however, there is  concern for high morbidity with  surgery and therefore it was decided to pursue long term posaconazole.     EBV viremia: elevated to 960k in 10/2021. Suspect due to recent hospitalization, health stress. Decreased to 135k 2/2022. Neg 6/27/22. Can check every 6-12 months.     Candidacy for kidney transplant: This is a very difficult call. She is at risk for aspergillus infection worsening with heightened immune suppression soon after kidney transplant but its hard to know how much of a risk this will be. Also people with liver disease have worse fungal infection severity. Patient is ok with taking this risk. Ok to proceed with transplant from an ID standpoint.                Interval History:   Continues to be on posaconazole 300 mg po daily, tolerating it well.   posa level 5/26 2 - most likely trough. Alk phos is normal now   On Bactrim three times a week prophylaxis -  CBC ok    EBV neg 6/27/22    Got evusheld in May ?. Can get covid vaccine 4 th dose. She says she will get it next month when she has an appt with the lung transplant team   She feels well.    being worked up as living donor for kidney transplant       HPI: 10/2021   Patient is a 60 yo female with PMHx significant for ILD, seronegative rheumatoid arthritis and dermatomyositis s/p B/L lung transplantation 3/1/2018 (CMV D+/R+, EBV D+/R+). 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 Underwent CT guided biopsy on 10/8/19 which yielded purulent fluid--cx positive for aspergillus fumigatus. She underwent bronchoscopy with BAL on 10/11/19  which showed septate hyphae.  Notably, she had also grown Actinomyces from multiple BAL specimens in the past. She was started on voriconazole at the time.      Patient subsequently underwent repaet CT chest on 7/18/20 at OSH and was noted to have interval increased hypodense mass like region in medial aspect of left lower lung. As a result, patient underwent CT guided lung tissue biopsy. Histopath showed acute inflammation, necrosis and myofibroblastic proliferation with focal fungal hyphae highlighted by GMS.  Cultures positive for Aspergillus fumigatus. Voriconazole was changed to posaconazole at that time     1/5/21- BAL Cx with stenotrophomonas maltophilia-treated with ceftazidime x 2 weeks     1/24/21-2/25/21 hospitalized with AHRF requiring intubation 2/2 PCP pneumonia/ARDS(bactrim prophylaxis had been stopped due to side effects) with subsequent trach on 2/12/21. She was discharged to the acute rehab and subsequently went home on 3/5/21.     In April 2021 she was found to have a left pleural effusion and hence had it drained with a chest tube x 1 week- ampho beads were placed. Pleurodesis was planned but abandoned due to fungal elements on stain. Fluid was exudative, fungal elements were seen but fungal cx was negative including other cxs. Serum Asp GM qas neg, serum BDG continued to be positive. She had a follow up CT chest in Sep which did not show a recurrence of the left pleural effusion.     She continues on posaconazole 300 mg daily and levels have been good and she is tolerating it well. She has been on dialysis for the past 2 years and is wondering about a kidney transplant.     She also has a history of liver dysfunction with alk phos elevation, ascites and portal HTN, followed by hepatology, suggestive of congestive hepatopathy with moderate zone 3 pericellular fibrosis. The ascites improved with dialysis. She reports that it has been a long time since she had a paracentesis.     Recent admission  in Sep 2021 for most likely fluid overload vs HCAP. Oct 2021 EBV elevated to 969k from 45k in Sep 2021.     Results for SARAI KILLIAN (MRN [id number]) as of 10/15/2021 21:46   Ref. Range 9/15/2021 09:15 10/12/2021 12:51   EBV DNA Copies/mL Latest Ref Range: <=0 copies/mL 45,122 (H) 969,411 (H)     Results for SARAI KILLIAN (MRN [id number]) as of 10/15/2021 21:46   Ref. Range 12/9/2020 07:21 1/25/2021 12:59 2/22/2021 05:27 5/1/2021 06:54   EBV DNA Copies/mL Latest Ref Range: EBVNEG^EBV DNA Not Detected Copies/mL 10,686 (A) 5,619 (A) 75,709 (A) 38,186 (A)              Current Medications & Allergies:   Reviewed    No Known Allergies         Physical Exam:     Unable to examine due to virtual visit          Laboratory Data:     Inflammatory Markers    Recent Labs   Lab Test 10/07/19  1221 10/06/19  1444 09/13/19  0934 09/11/19  2325 08/22/19  0820   SED 93*  --  111* 102*  --    CRP  --  25.0* 58.0* 66.0* 47.0*       Immune Globulin Studies     Recent Labs   Lab Test 09/15/21  0915 01/31/21  0441 01/25/21  0406 10/27/19  0621 03/01/18  0356 02/19/18  0759   IGG 1,198 763  --  936 698 1,790*   IGM  --   --   --   --   --  502*   IGE  --   --  <2  --   --  <2   IGA  --   --   --   --   --  425*   IGG1  --   --   --   --   --  1,300*   IGG2  --   --   --   --   --  131*   IGG3  --   --   --   --   --  101   IGG4  --   --   --   --   --  1*       Metabolic Studies       Recent Labs   Lab Test 07/11/22  1012 06/27/22  1013 05/26/22  0750 02/11/22  1032 02/10/22  0919 09/29/21  1105 09/23/21  1144 09/23/21  0628 09/22/21  1008 09/20/21  2128 09/20/21  1903 09/16/21  0749 09/15/21  0915 05/05/21  0707 05/03/21  0640 10/22/19  1314 10/21/19  1752 08/13/19  0000 08/07/19  0959   NA  --   --  137  --  134   < >  --    < > 134   < > 132*  --  136   < > 132*   < > 133   < > 133   POTASSIUM  --   --  3.5  --  4.3   < >  --    < > 3.5   < > 5.3   < > 4.8   < > 5.5*   < > 5.0   < > 4.2   CHLORIDE 100 99 96   < > 94   < >   --    < > 103   < > 101  --  98   < > 100   < > 109   < > 98   CO2  --   --  32  --  30   < >  --    < > 22   < > 23  --  26   < > 23   < > 10*   < > 29   ANIONGAP  --   --  9  --  10   < >  --    < > 9   < > 8  --  12   < > 9   < > 13   < > 6   BUN  --   --  42*  --  44*   < >  --    < > 51*   < > 90*  --  79*   < > 85*   < > 66*   < > 27   CR  --   --  3.98*  --  4.27*   < >  --    < > 4.46*   < > 5.85*  --  5.63*   < > 4.55*   < > 5.76*   < > 1.80*   GFRESTIMATED  --   --  12*  --  11*   < >  --    < > 10*   < > 7*  --  8*   < > 10*   < > 8*   < > 31*   GLC  --   --  110*   < > 157*   < >  --    < > 192*   < > 98   < > 152*   < > 129*   < > 138*   < > 119*   A1C  --   --  5.2  --   --   --   --   --   --   --   --   --  5.8*   < >  --    < >  --   --   --    CHELSEY  --   --  9.5  --  9.6   < >  --    < > 8.0*   < > 8.5  --  8.6   < > 7.8*   < > 8.0*   < > 9.8   PHOS  --   --   --   --   --   --   --   --   --   --   --   --   --   --  5.9*   < > 6.7*   < >  --    MAG  --   --  1.8  --  1.8   < >  --   --   --   --   --   --   --   --   --    < > 2.0   < > 2.2   LACT  --   --   --   --   --   --  1.5  --  0.7   < >  --   --   --   --   --    < >  --    < >  --    PCAL  --   --   --   --   --   --   --   --   --   --  0.21  --   --   --   --    < >  --    < >  --    FGTL  --   --   --   --   --   --   --   --   --   --   --   --  210  --   --    < >  --    < >  --    CKT  --   --   --   --   --   --   --   --   --   --   --   --   --   --   --   --  70  --  36    < > = values in this interval not displayed.       Hepatic Studies    Recent Labs   Lab Test 10/12/21  1251 09/21/21  1055 09/20/21  1903 09/15/21  0915 05/01/21  0654 01/27/21  0414 01/26/21  0425   BILITOTAL 0.8 1.2 1.1   < >  --    < > 0.8   DBIL 0.4*  --   --    < >  --    < > 0.6*   ALKPHOS 316* 324* 321*   < >  --    < > 184*   PROTTOTAL 7.8 6.7* 7.4   < > 7.2   < > 6.0*  6.0*   ALBUMIN 2.8* 2.6* 3.0*   < >  --    < > 2.0*   AST 24 22 22   < >  --    <  > 11   ALT 52* 80* 98*   < >  --    < > 30   LDH  --   --   --   --  164  --  187    < > = values in this interval not displayed.     Hematology Studies      Recent Labs   Lab Test 05/26/22  0750 02/10/22  0920 09/23/21  0628 09/22/21  0610 09/21/21  1055 09/20/21  1903 04/29/21  1020 04/08/21  0722 02/27/21  0853 02/25/21  0542   WBC 5.6 7.4 13.9* 12.2* 11.5* 9.9   < > 6.3   < > 2.9*   ANEU  --   --   --   --   --   --   --  4.8  --  1.9   ALYM  --   --   --   --   --   --   --  0.7*  --  0.6*   SHERRI  --   --   --   --   --   --   --  0.8  --  0.4   AEOS  --   --   --   --   --   --   --  0.1  --  0.1   HGB 11.2* 11.9 10.4* 10.7* 10.3* 11.3*   < > 9.7*   < > 9.9*   HCT 36.2 37.0 33.0* 33.3* 31.9* 36.1   < > 29.9*   < > 32.0*    243 165 200 218 228   < > 178   < > 293    < > = values in this interval not displayed.       posaconazole level ok 9/2021, 5/2022    Microbiology:  CMV, EBV neg 7/2022 5/2021 pleural fluid KOH - pos fungal elements, fungal cx neg    10/2019 pleural fluid fungal cx   Aspergillus fumigatus    E-TEST   Amphotericin B 0.5 ug/mL No interpretation available    Comment: See comment... 1     Itraconazole 0.12 ug/mL No interpretation available    Micafungin <=0.06 ug/mL No interpretation available    Posaconazole <=0.06 ug/ml No interpretation available    Voriconazole 0.5 ug/mL No interpretation available      Lung tissue Fungal cx aug 2020    Aspergillus fumigatus     FUNGAL YEAST JAYME     Amphotericin B 1.0 ug/mL No interpretation available     Comment: See comment... 1      Itraconazole 0.25 ug/mL No interpretation available     Micafungin <=0.03 ug/mL No interpretation available     Posaconazole 0.12 ug/ml No interpretation available     Voriconazole 0.5 ug/mL No interpretation available      Imaging: personally reviewed   CXR 2/10/22  IMPRESSION:  1. Decreased retrocardiac opacities, likely representing  fibroatelectasis. Cannot exclude infectious component.  2. No new focal  opacities, pleural effusion, or pneumothorax.  3. Mild bowel loop dilation in the limited upper abdomen.    CT chest 9/15/21  IMPRESSION: Small old partially calcified empyema paravertebral left  lower lung. Removal of previous thoracostomy tube. Unchanged  osteopenic T5 compression deformity. Bilateral lung transplantation.  Increased opacities in right lower lung which may indicate worsening  inflammation rather than edema. Continued right-sided bronchial  anastomotic narrowing, unchanged from just over 4 months ago.  Cholelithiasis. Aortic atherosclerosis.    CT chest 5/4/21  1. Decreased size of the peripherally calcified chronic empyema within  the medial left lower lobe with left sided chest tube in place.  Retained amphotericin bead is within the collection.  2. Cardiomegaly with basilar predominance groundglass opacities and  interlobular septal thickening, compatible with pulmonary edema.  3. Postsurgical changes of bilateral lung transplant with unchanged  mild right bronchial anastomotic narrowing.    CT chest 4/8/21  1. Stable partially calcified pleural cystic structure/collection  along the medial right lower lobe, consistent with chronic  empyema/sequela of prior infection. Trace bilateral pleural effusions.  2. Cardiomegaly with basilar predominant patchy groundglass opacities  with diffuse interlobular septal thickening, consistent with pulmonary  edema.  3. Tree-in-bud nodular opacities predominantly involving the lingula,  concerning for infection.  4. Stable postsurgical changes of bilateral lung transplantation.  5. Cholelithiasis.  6. Splenomegaly.     Again, thank you for allowing me to participate in the care of your patient.      Sincerely,    Julia Espinoza MD

## 2022-08-03 ENCOUNTER — TELEPHONE (OUTPATIENT)
Dept: TRANSPLANT | Facility: CLINIC | Age: 62
End: 2022-08-03

## 2022-08-03 ENCOUNTER — LAB (OUTPATIENT)
Dept: LAB | Facility: CLINIC | Age: 62
End: 2022-08-03
Payer: MEDICARE

## 2022-08-03 DIAGNOSIS — Z94.2 S/P LUNG TRANSPLANT (H): ICD-10-CM

## 2022-08-03 PROCEDURE — 80197 ASSAY OF TACROLIMUS: CPT

## 2022-08-03 NOTE — TELEPHONE ENCOUNTER
DATE:  8/3/2022     TIME OF RECEIPT FROM LAB:  11:22    ORDERING PROVIDER:     LAB TESTS: Creatinine = 5.3  Alk Phos = 316    RESULTS GIVEN WITH READ-BACK TO: Mandi Brown    TIME LAB VALUE REPORTED TO PROVIDER:   11:35

## 2022-08-04 LAB
TACROLIMUS BLD-MCNC: 9.4 UG/L (ref 5–15)
TME LAST DOSE: NORMAL H
TME LAST DOSE: NORMAL H

## 2022-08-05 NOTE — RESULT ENCOUNTER NOTE
Armand Mcdonnell,   Your tacrolimus level was 9.4 at 12 hours on 8/3/22 which is within your goal range of 8-10. No dose change at this time. Please call the transplant office ([phone]) with any questions.   Thanks,   Mikayla

## 2022-08-12 DIAGNOSIS — N18.5 CHRONIC KIDNEY DISEASE, STAGE V (H): Primary | ICD-10-CM

## 2022-08-12 PROCEDURE — 999N001093 HLA VIRTUAL CROSSMATCH (VXM), LIVING DONOR: Performed by: SURGERY

## 2022-08-15 ENCOUNTER — TELEPHONE (OUTPATIENT)
Dept: TRANSPLANT | Facility: CLINIC | Age: 62
End: 2022-08-15

## 2022-08-15 NOTE — TELEPHONE ENCOUNTER
PA Initiation    Medication: posaconazole (NOXAFIL) 100 MG EC tablet- PA Pending  Insurance Company: [employer] - Phone [phone] Fax [phone]  Pharmacy Filling the Rx: [id number] - [place], [place] - [address] [place] [address] & [facility]  Filling Pharmacy Phone: [phone]  Filling Pharmacy Fax:    Start Date: 8/15/2022

## 2022-08-15 NOTE — TELEPHONE ENCOUNTER
Prior Authorization Approval    Authorization Effective Date: 5/17/2022  Authorization Expiration Date: 2/11/2023  Medication: posaconazole (NOXAFIL) 100 MG EC tablet- PA Approved  Approved Dose/Quantity: UD  Reference #:     Insurance Company: [employer] - Phone [phone] Fax [phone]  Expected CoPay: $635.68  CoPay Card Available:      Foundation Assistance Needed:    Which Pharmacy is filling the prescription (Not needed for infusion/clinic administered): [id number] - [place], [place] - [address] [place] [address] AT [address]  Pharmacy Notified: Yes  Patient Notified: yes

## 2022-08-24 ENCOUNTER — LAB REQUISITION (OUTPATIENT)
Dept: LAB | Facility: CLINIC | Age: 62
End: 2022-08-24
Payer: MEDICARE

## 2022-08-24 LAB
CROSSMATCHDATEVXM: NORMAL
DONOR VXM: NORMAL
DSA VXM B1: NORMAL
DSA VXM B2: NORMAL
DSA VXMT1: NORMAL
DSA VXMT2: NORMAL
PROTOCOL CUTOFF: NORMAL
RESULT VXM B1: NORMAL
RESULT VXM B2: NORMAL
RESULT VXM T1: NORMAL
RESULT VXM T2: NORMAL
SERUM DATE VXM B1: NORMAL
SERUM DATE VXM B2: NORMAL
SERUM DATE VXM T1: NORMAL
SERUM DATE VXM T2: NORMAL
UNACCEPTABLE ANTIGENS: NORMAL
UNOS CPRA: 84
ZZZCOMMENT VXMB1: NORMAL
ZZZCOMMENT VXMB2: NORMAL
ZZZCOMMENT VXMT1: NORMAL
ZZZCOMMENT VXMT2: NORMAL

## 2022-09-06 ENCOUNTER — VIRTUAL VISIT (OUTPATIENT)
Dept: GASTROENTEROLOGY | Facility: CLINIC | Age: 62
End: 2022-09-06
Attending: INTERNAL MEDICINE
Payer: MEDICARE

## 2022-09-06 DIAGNOSIS — K76.89 LIVER DYSFUNCTION: ICD-10-CM

## 2022-09-06 DIAGNOSIS — Z01.818 PRE-TRANSPLANT EVALUATION FOR KIDNEY TRANSPLANT: ICD-10-CM

## 2022-09-06 DIAGNOSIS — J98.4 DISEASE OF LUNG: ICD-10-CM

## 2022-09-06 DIAGNOSIS — I10 ESSENTIAL HYPERTENSION: ICD-10-CM

## 2022-09-06 DIAGNOSIS — N18.5 CHRONIC KIDNEY DISEASE, STAGE V (H): ICD-10-CM

## 2022-09-06 PROCEDURE — G0463 HOSPITAL OUTPT CLINIC VISIT: HCPCS | Mod: PN,RTG | Performed by: INTERNAL MEDICINE

## 2022-09-06 PROCEDURE — 99215 OFFICE O/P EST HI 40 MIN: CPT | Mod: 95 | Performed by: INTERNAL MEDICINE

## 2022-09-06 PROCEDURE — G0463 HOSPITAL OUTPT CLINIC VISIT: HCPCS | Mod: GT

## 2022-09-06 NOTE — LETTER
9/6/2022         RE: Kecia Blue  [address] [place] [address]        Dear Colleague,    Thank you for referring your patient, Kecia Blue, to the [facility] [place]. Please see a copy of my visit note below.    Kecia is a 61 year old who is being evaluated via a billable video visit.      How would you like to obtain your AVS? MyChart  If the video visit is dropped, the invitation should be resent by: Text to cell phone: [phone]  Will anyone else be joining your video visit? No      [facility]    Follow-up Visit    CHIEF COMPLAINT AND REASON FOR THE VISIT:  History of new onset ascites.    CONSULTING HEALTHCARE PROVIDER:  Rosy Vu MD    SUBJECTIVE:  Mrs. Blue is a 61-year-old female with a history of hypertension, end-stage kidney disease, on dialysis, interstitial lung disease with antisynthetase syndrome, who had in the past, i.e., 03/2018, bilateral lung transplantation.  We saw her for new onset ascites related with portal hypertension.  In fact, when we did a workup here, her SAAG was 1.1 and liver biopsy done at that time showed that this could be congestive hepatopathy with moderate zone 3 pericellular fibrosis without bridging fibrosis, which was good as it was not showing any cirrhosis.  Her portal pressure gradient was also elevated.      When the patient's dialysis was optimized in anyway, the fluid in her abdomen improved.  In fact, she did not have any abdominal distention today, according to her, and she had no edema.  She was also seen by Cardiology who did not find any heart issues, which could be an impediment for her to kidney transplantation.  Mrs. Blue also denied any jaundice, did not have any signs of lethargy or confusion and she did not have any gastrointestinal bleeding.  She also denies today any shortness of breath.  She had, as far as the vaccine is concerned, the Pfizer and has  done 3 doses so far.  As far as the dialysis is concerned, she gets Monday, Wednesday and Friday.  The patient's weight also has stayed stable and in fact, she claims to be 129 pounds.    Medical hx Surgical hx   Past Medical History:   Diagnosis Date     Acute on chronic respiratory failure with hypoxia (H) 02/21/2018     Anisocoria      Antisynthetase syndrome (H) 2014     Anxiety      Aspergillus (H)      Aspergillus pneumonia (H) 11/20/2020     Benign essential hypertension      C. difficile colitis      Cytomegalovirus (CMV) viremia (H)      Dermatomyositis (H)      Dysplasia of cervix, low grade (ESTRADA 1)      EBV (Franklin-Barr virus) viremia      ESRD (end stage renal disease) on dialysis (H)      ILD (interstitial lung disease) (H)      Lung replaced by transplant (H)      Osteopenia      Paroxysmal atrial fibrillation (H)      Pneumocystis jiroveci pneumonia (H)      PONV (postoperative nausea and vomiting)      Post-menopause      Pulmonary hypertension (H)      Raynaud's disease      Seronegative rheumatoid arthritis (H)       Past Surgical History:   Procedure Laterality Date     BRONCHOSCOPY (RIGID OR FLEXIBLE), DIAGNOSTIC N/A 4/10/2018    Procedure: COMBINED BRONCHOSCOPY (RIGID OR FLEXIBLE), LAVAGE;;  Surgeon: Mariposa Donohue MD;  [facility]     BRONCHOSCOPY (RIGID OR FLEXIBLE), DIAGNOSTIC N/A 12/23/2020    Procedure: BRONCHOSCOPY, WITH BRONCHOALVEOLAR LAVAGE;  Surgeon: Uri Bass MD;  [facility]     BRONCHOSCOPY (RIGID OR FLEXIBLE), DIAGNOSTIC N/A 5/26/2022    Procedure: BRONCHOSCOPY, WITH BRONCHOALVEOLAR LAVAGE;  Surgeon: Uri Bass MD;  [facility]     BRONCHOSCOPY (RIGID OR FLEXIBLE), DILATE BRONCHUS / TRACHEA N/A 10/11/2018    Procedure: BRONCHOSCOPY (RIGID OR FLEXIBLE), DILATE BRONCHUS / TRACHEA;  Flexible And Rigid Bronchoscopy and Dilation;  Surgeon: Wilber Lin MD;  [facility]     BRONCHOSCOPY FLEXIBLE N/A 3/13/2018    Procedure: BRONCHOSCOPY  FLEXIBLE;  Flexible Bronchoscopy ;  Surgeon: Gissell Sanchez MD;  [facility]     BRONCHOSCOPY FLEXIBLE N/A 5/9/2018    Procedure: BRONCHOSCOPY FLEXIBLE;;  Surgeon: Wilber Lin MD;  [facility]     BRONCHOSCOPY FLEXIBLE AND RIGID N/A 9/10/2018    Procedure: BRONCHOSCOPY FLEXIBLE AND RIGID;  Flexible and Rigid Bronchoscopy with Balloon Dilation, tissue debulking with cryo, and Right mainstem bronchus stent placement;  Surgeon: Wilber Lin MD;  [facility]     BRONCHOSCOPY RIGID N/A 6/6/2018    Procedure: BRONCHOSCOPY RIGID;;  Surgeon: Lopez Macias MD;  [facility]     BRONCHOSCOPY, DILATE BRONCHUS, STENT BRONCHUS, COMBINED N/A 6/11/2018    Procedure: COMBINED BRONCHOSCOPY, DILATE BRONCHUS, STENT BRONCHUS;  Flexible Bronchoscopy, Balloon Dilation, Bronchial Washings;  Surgeon: Wilber Lin MD;  [facility]     BRONCHOSCOPY, DILATE BRONCHUS, STENT BRONCHUS, COMBINED Right 7/10/2018    Procedure: COMBINED BRONCHOSCOPY, DILATE BRONCHUS, STENT BRONCHUS;  Flexible Bronchoscopy, Balloon Dilation, Bronchial Washings  ;  Surgeon: Wilber Lin MD;  [facility]     BRONCHOSCOPY, DILATE BRONCHUS, STENT BRONCHUS, COMBINED N/A 8/2/2018    Procedure: COMBINED BRONCHOSCOPY, DILATE BRONCHUS, STENT BRONCHUS;  Flexible Bronchoscopy, Bronchial Washings, Balloon Dilation;  Surgeon: Wilber Lin MD;  [facility]     BRONCHOSCOPY, DILATE BRONCHUS, STENT BRONCHUS, COMBINED N/A 8/20/2018    Procedure: COMBINED BRONCHOSCOPY, DILATE BRONCHUS, STENT BRONCHUS;  Flexible Bronchoscopy, Balloon Dilation;  Surgeon: Wilber Lin MD;  [facility]     BRONCHOSCOPY, DILATE BRONCHUS, STENT BRONCHUS, COMBINED N/A 10/29/2018    Procedure: Flexible Bronchoscopy, Balloon Dilation, Stent Revision, Airway Examination And Therapeutic Suctioning, Cyro Tumor Debulking;  Surgeon: Wilber Lin MD;  [facility]     BRONCHOSCOPY, DILATE  BRONCHUS, STENT BRONCHUS, COMBINED N/A 11/20/2018    Procedure: Rigid Bronchoscopy, Stent Removal and dilitation;  Surgeon: Wilber Lin MD;  [facility]     BRONCHOSCOPY, DILATE BRONCHUS, STENT BRONCHUS, COMBINED N/A 12/14/2018    Procedure: Flexible And Rigid Bronchoscopy, Balloon Dilation, Bronchial Washing;  Surgeon: Wilber Lin MD;  [facility]     BRONCHOSCOPY, DILATE BRONCHUS, STENT BRONCHUS, COMBINED N/A 1/17/2019    Procedure: Flexible And Rigid Bronchoscopy, Balloon Dilation.;  Surgeon: Wilber Lin MD;  [facility]     BRONCHOSCOPY, DILATE BRONCHUS, STENT BRONCHUS, COMBINED N/A 3/7/2019    Procedure: Flexible and Rigid Bronchoscopy, Bronchial Washing, Balloon Dilation;  Surgeon: Wilber Lin MD;  [facility]     BRONCHOSCOPY, DILATE BRONCHUS, STENT BRONCHUS, COMBINED N/A 6/6/2019    Procedure: Rigid and Flexible Bronchoscopy, Balloon Dilation;  Surgeon: Wilber Lin MD;  [facility]     BRONCHOSCOPY, DILATE BRONCHUS, STENT BRONCHUS, COMBINED N/A 10/11/2019    Procedure: Flexible and Rigid Bronchoscopy, Balloon Dilation, Bronchoalveolar Lagave;  Surgeon: Wilber Lin MD;  [facility]     BRONCHOSCOPY, DILATE BRONCHUS, STENT BRONCHUS, COMBINED N/A 2/19/2021    Procedure: BRONCHOSCOPY, flexible, airway dilation, and bronchial wash;  Surgeon: Wilber Lin MD;  [facility]     BRONCHOSCOPY, DILATE BRONCHUS, STENT BRONCHUS, COMBINED N/A 4/9/2021    Procedure: BRONCHOSCOPY, flexible and rigid, Airway suctioning;  Surgeon: Mati Norris MD;  [facility]     CV RIGHT HEART CATH MEASUREMENTS RECORDED N/A 3/10/2020    Procedure: CV RIGHT HEART CATH;  Surgeon: Wai Garcia MD;  Location:  HEART CARDIAC CATH LAB     ENT SURGERY      tonsillectomy as a child     ESOPHAGOSCOPY, GASTROSCOPY, DUODENOSCOPY (EGD), COMBINED N/A 10/29/2018    Procedure: COMBINED ESOPHAGOSCOPY, GASTROSCOPY, DUODENOSCOPY (EGD) with  biopsies and polypectomy;  Surgeon: Chente Bloom MD;  [facility]     INSERT EXTRACORPORAL MEMBRANE OXYGENATOR ADULT (OUTSIDE OR) N/A 2/27/2018    Procedure: INSERT EXTRACORPORAL MEMBRANE OXYGENATOR ADULT (OUTSIDE OR);  INSERT EXTRACORPORAL MEMBRANE OXYGENATOR ADULT (OUTSIDE OR) ;  Surgeon: Toby Hernandez MD;  [facility]     IR CVC TUNNEL PLACEMENT > 5 YRS OF AGE  10/25/2019     IR DIALYSIS FISTULOGRAM LEFT  3/2/2021     IR DIALYSIS MECH THROMB, PTA  3/2/2021     IR FLUORO 0-1 HOUR  5/7/2021     IR GASTRO JEJUNOSTOMY TUBE PLACEMENT  2/16/2021     IR PARACENTESIS  1/8/2020     IR THORACENTESIS  9/13/2019     IR TRANSCATHETER BIOPSY  1/8/2020     LASER CO2 BRONCHOSCOPY N/A 4/30/2021    Procedure: Flexible and Rigid Bronchoscopy and Tissue Debulking with CO2 Laser Assistance;  Surgeon: Mati Norris MD;  [facility]     LASER CO2 BRONCHOSCOPY N/A 6/11/2021    Procedure: BRONCHOSCOPY, Flexible and Rigid Bronchoscopy, Tissue Debulking with cryo Assistance, airway dilation,;  Surgeon: Mati Norris MD;  [facility]     LASER CO2 BRONCHOSCOPY N/A 9/16/2021    Procedure: BRONCHOSCOPY, flexible and rigid, CO2 Laser Debulking;  Surgeon: Mati Norris MD;  [facility]     LASER CO2 BRONCHOSCOPY N/A 2/11/2022    Procedure: flexible, rigid bronchoscopy, tissue debulking, airway dilation, co2 laser, bronchoalveolar lavage;  Surgeon: Juana Baugh MD;  [facility]     no prior surgery       REMOVE EXTRACORPORAL MEMBRANE OXYGENATOR ADULT N/A 3/3/2018    Procedure: REMOVE EXTRACORPORAL MEMBRANE OXYGENATOR ADULT;  Removal of Right Femoral Venous and Right Axillary Arterial Extracorporeal Membrane Oxygenator;  Surgeon: Toby Hernandez MD;  [facility]     TRANSPLANT LUNG RECIPIENT SINGLE X2 Bilateral 3/1/2018    Procedure: TRANSPLANT LUNG RECIPIENT SINGLE X2;  Median Sternotomy, Extracorporeal Membrane Oxygenator, bilateral sequential lung transplant;   Surgeon: Toby Hernandez MD;  [facility]          Medications  Prior to Admission medications    Medication Sig Start Date End Date Taking? Authorizing Provider   acetaminophen (TYLENOL) 325 MG tablet Take 1 tablet (325 mg) by mouth every 4 hours as needed for mild pain or fever 2/25/21  Yes Leelee Huang MD   albuterol (PROVENTIL) (2.5 MG/3ML) 0.083% neb solution Take 1 vial (2.5 mg) by nebulization daily 2/10/22  Yes Ame Chow PA-C   azaTHIOprine (IMURAN) 50 MG tablet Take 0.5 tablets (25 mg) by mouth daily 3/3/22  Yes Yenni Graham MD   budesonide (PULMICORT) 0.5 MG/2ML neb solution Take 2 mLs (0.5 mg) by nebulization daily 2/10/22  Yes Ame Chow PA-C   calcium acetate (PHOSLO) 667 MG CAPS capsule Take 1 capsule (667 mg) by mouth 3 times daily (with meals) (largest meal) 5/26/22  Yes Ame Chow PA-C   Magnesium Cl-Calcium Carbonate (SLOW-MAG) 71.5-119 MG TBEC Take 3 tablets by mouth four times a week Take on non dialysis days T/Th/Sa/Su 4/8/21  Yes Yenni Graham MD   metoprolol tartrate (LOPRESSOR) 25 MG tablet 1 tablet (25 mg) by Oral or Feeding Tube route 2 times daily 6/22/22  Yes Ame Chow PA-C   multivitamin RENAL (RENAVITE RX/NEPHROVITE) 1 MG tablet Take 1 tablet by mouth daily 4/26/22  Yes Ame Chow PA-C   pantoprazole (PROTONIX) 40 MG EC tablet Take 1 tablet (40 mg) by mouth every morning (before breakfast) 3/1/22  Yes Ame Chow PA-C   posaconazole (NOXAFIL) 100 MG EC tablet Take 3 tablets (300 mg) by mouth daily 11/15/21  Yes Ame Chow PA-C   predniSONE (DELTASONE) 2.5 MG tablet Take 1 tablet (2.5 mg) by mouth every evening 5/10/22  Yes Ame Chow PA-C   predniSONE (DELTASONE) 5 MG tablet Take 1 tablet (5 mg) by mouth every morning 5/10/22  Yes Ame Chow PA-C   sulfamethoxazole-trimethoprim (BACTRIM) 400-80 MG tablet Take 1 tablet by mouth Every Mon, Wed, Fri Morning 5/4/22   Yes Ame Chow PA-C   tacrolimus (GENERIC EQUIVALENT) 0.5 MG capsule Take 0.5mg in am (1 cap) and 1mg in pm (2 caps) 7/14/22  Yes Ame Chow PA-C       Allergies  No Known Allergies    Review of systems  A 10-point review of systems was negative    Examination  LMP 06/07/2014 (Exact Date)   There is no height or weight on file to calculate BMI.    Gen- well, NAD, A+Ox3, normal color  Psych- normal mood    Laboratory  Lab Results   Component Value Date     05/26/2022     05/07/2021    POTASSIUM 3.5 05/26/2022    POTASSIUM 4.3 05/07/2021    CHLORIDE 102 08/03/2022    CHLORIDE 102 05/07/2021    CO2 32 05/26/2022    CO2 23 05/07/2021    BUN 42 05/26/2022    BUN 66 05/07/2021    CR 3.98 05/26/2022    CR 3.90 05/07/2021       Lab Results   Component Value Date    BILITOTAL 0.8 10/12/2021    BILITOTAL 0.9 03/29/2021    ALT 52 10/12/2021    ALT 36 03/29/2021    AST 24 10/12/2021    AST 19 03/29/2021    ALKPHOS 316 10/12/2021    ALKPHOS 288 03/29/2021       Lab Results   Component Value Date    ALBUMIN 2.8 10/12/2021    ALBUMIN 3.5 03/29/2021    PROTTOTAL 7.8 10/12/2021    PROTTOTAL 7.2 05/01/2021        Lab Results   Component Value Date    WBC 5.6 05/26/2022    WBC 7.5 05/02/2021    HGB 11.2 05/26/2022    HGB 10.6 05/02/2021     05/26/2022    MCV 97 05/02/2021     05/26/2022     05/02/2021       Lab Results   Component Value Date    INR 0.96 05/26/2022    INR 0.99 04/29/2021       Radiology    ASSESSMENT AND PLAN:  New onset ascites.  Ms. Blue had what appears to be new onset ascites, thought to be related with portal hypertension.  She had a liver biopsy, which showed congestive hepatopathy with moderate zone 3 hepatocellular fibrosis.  This has since improved when her dialysis was optimized.  In fact, she states that she has no more abdominal distention.  She also denies any edema.      Our plan now is to get an abdominal ultrasound to check for any fluid and   if it is present do a diagnostic paracentesis.  She had already seen Cardiology, who thought that she could go forward for the kidney transplantation and that she does not have any impediment to that.  Cardiologically, she had paroxysmal atrial fibrillation.  She had been in the hospital for ARDS.  This has since resolved.  She otherwise follows with Dr. Schaffer.  Mrs. Blue will follow up with her PCP and her pulmonary doctors regarding her healthcare maintenance and she will be seen here in 6 months.    I spent 40 minutes on this encounter of 09/06/2022 in chart reviewing, history taking and documentation.  I spent some of the time also in coordination of care and consult.    Feng Denise MD  Hepatology  [facility]

## 2022-09-06 NOTE — PROGRESS NOTES
Kecia is a 61 year old who is being evaluated via a billable video visit.      How would you like to obtain your AVS? MyChart  If the video visit is dropped, the invitation should be resent by: Text to cell phone: [phone]  Will anyone else be joining your video visit? [facility]    Follow-up Visit    CHIEF COMPLAINT AND REASON FOR THE VISIT:  History of new onset ascites.    CONSULTING HEALTHCARE PROVIDER:  Rosy Vu MD    SUBJECTIVE:  Mrs. Blue is a 61-year-old female with a history of hypertension, end-stage kidney disease, on dialysis, interstitial lung disease with antisynthetase syndrome, who had in the past, i.e., 03/2018, bilateral lung transplantation.  We saw her for new onset ascites related with portal hypertension.  In fact, when we did a workup here, her SAAG was 1.1 and liver biopsy done at that time showed that this could be congestive hepatopathy with moderate zone 3 pericellular fibrosis without bridging fibrosis, which was good as it was not showing any cirrhosis.  Her portal pressure gradient was also elevated.      When the patient's dialysis was optimized in anyway, the fluid in her abdomen improved.  In fact, she did not have any abdominal distention today, according to her, and she had no edema.  She was also seen by Cardiology who did not find any heart issues, which could be an impediment for her to kidney transplantation.  Mrs. Blue also denied any jaundice, did not have any signs of lethargy or confusion and she did not have any gastrointestinal bleeding.  She also denies today any shortness of breath.  She had, as far as the vaccine is concerned, the Pfizer and has done 3 doses so far.  As far as the dialysis is concerned, she gets Monday, Wednesday and Friday.  The patient's weight also has stayed stable and in fact, she claims to be 129 pounds.    Medical hx Surgical hx   Past Medical History:   Diagnosis Date     Acute on chronic  respiratory failure with hypoxia (H) 02/21/2018     Anisocoria      Antisynthetase syndrome (H) 2014     Anxiety      Aspergillus (H)      Aspergillus pneumonia (H) 11/20/2020     Benign essential hypertension      C. difficile colitis      Cytomegalovirus (CMV) viremia (H)      Dermatomyositis (H)      Dysplasia of cervix, low grade (ESTRADA 1)      EBV (Franklin-Barr virus) viremia      ESRD (end stage renal disease) on dialysis (H)      ILD (interstitial lung disease) (H)      Lung replaced by transplant (H)      Osteopenia      Paroxysmal atrial fibrillation (H)      Pneumocystis jiroveci pneumonia (H)      PONV (postoperative nausea and vomiting)      Post-menopause      Pulmonary hypertension (H)      Raynaud's disease      Seronegative rheumatoid arthritis (H)       Past Surgical History:   Procedure Laterality Date     BRONCHOSCOPY (RIGID OR FLEXIBLE), DIAGNOSTIC N/A 4/10/2018    Procedure: COMBINED BRONCHOSCOPY (RIGID OR FLEXIBLE), LAVAGE;;  Surgeon: Mariposa Donohue MD;  Location: [facility]     BRONCHOSCOPY (RIGID OR FLEXIBLE), DIAGNOSTIC N/A 12/23/2020    Procedure: BRONCHOSCOPY, WITH BRONCHOALVEOLAR LAVAGE;  Surgeon: Uri Bass MD;  Location: [facility]     BRONCHOSCOPY (RIGID OR FLEXIBLE), DIAGNOSTIC N/A 5/26/2022    Procedure: BRONCHOSCOPY, WITH BRONCHOALVEOLAR LAVAGE;  Surgeon: Uri Bass MD;  Location: [facility]     BRONCHOSCOPY (RIGID OR FLEXIBLE), DILATE BRONCHUS / TRACHEA N/A 10/11/2018    Procedure: BRONCHOSCOPY (RIGID OR FLEXIBLE), DILATE BRONCHUS / TRACHEA;  Flexible And Rigid Bronchoscopy and Dilation;  Surgeon: Wilber Lin MD;  [facility]     BRONCHOSCOPY FLEXIBLE N/A 3/13/2018    Procedure: BRONCHOSCOPY FLEXIBLE;  Flexible Bronchoscopy ;  Surgeon: Gissell Sanchez MD;  [facility]     BRONCHOSCOPY FLEXIBLE N/A 5/9/2018    Procedure: BRONCHOSCOPY FLEXIBLE;;  Surgeon: Wilber Lin MD;  [facility]     BRONCHOSCOPY FLEXIBLE AND RIGID N/A 9/10/2018     Procedure: BRONCHOSCOPY FLEXIBLE AND RIGID;  Flexible and Rigid Bronchoscopy with Balloon Dilation, tissue debulking with cryo, and Right mainstem bronchus stent placement;  Surgeon: Wilber Lin MD;  [facility]     BRONCHOSCOPY RIGID N/A 6/6/2018    Procedure: BRONCHOSCOPY RIGID;;  Surgeon: Lopez Macias MD;  [facility]     BRONCHOSCOPY, DILATE BRONCHUS, STENT BRONCHUS, COMBINED N/A 6/11/2018    Procedure: COMBINED BRONCHOSCOPY, DILATE BRONCHUS, STENT BRONCHUS;  Flexible Bronchoscopy, Balloon Dilation, Bronchial Washings;  Surgeon: Wilber Lin MD;  [facility]     BRONCHOSCOPY, DILATE BRONCHUS, STENT BRONCHUS, COMBINED Right 7/10/2018    Procedure: COMBINED BRONCHOSCOPY, DILATE BRONCHUS, STENT BRONCHUS;  Flexible Bronchoscopy, Balloon Dilation, Bronchial Washings  ;  Surgeon: Wilber Lin MD;  [facility]     BRONCHOSCOPY, DILATE BRONCHUS, STENT BRONCHUS, COMBINED N/A 8/2/2018    Procedure: COMBINED BRONCHOSCOPY, DILATE BRONCHUS, STENT BRONCHUS;  Flexible Bronchoscopy, Bronchial Washings, Balloon Dilation;  Surgeon: Wilber Lin MD;  [facility]     BRONCHOSCOPY, DILATE BRONCHUS, STENT BRONCHUS, COMBINED N/A 8/20/2018    Procedure: COMBINED BRONCHOSCOPY, DILATE BRONCHUS, STENT BRONCHUS;  Flexible Bronchoscopy, Balloon Dilation;  Surgeon: Wilber Lin MD;  [facility]     BRONCHOSCOPY, DILATE BRONCHUS, STENT BRONCHUS, COMBINED N/A 10/29/2018    Procedure: Flexible Bronchoscopy, Balloon Dilation, Stent Revision, Airway Examination And Therapeutic Suctioning, Cyro Tumor Debulking;  Surgeon: Wilber Lin MD;  [facility]     BRONCHOSCOPY, DILATE BRONCHUS, STENT BRONCHUS, COMBINED N/A 11/20/2018    Procedure: Rigid Bronchoscopy, Stent Removal and dilitation;  Surgeon: Wilber Lin MD;  [facility]     BRONCHOSCOPY, DILATE BRONCHUS, STENT BRONCHUS, COMBINED N/A 12/14/2018    Procedure: Flexible And Rigid  Bronchoscopy, Balloon Dilation, Bronchial Washing;  Surgeon: Wilber Lin MD;  [facility]     BRONCHOSCOPY, DILATE BRONCHUS, STENT BRONCHUS, COMBINED N/A 1/17/2019    Procedure: Flexible And Rigid Bronchoscopy, Balloon Dilation.;  Surgeon: Wilber Lin MD;  [facility]     BRONCHOSCOPY, DILATE BRONCHUS, STENT BRONCHUS, COMBINED N/A 3/7/2019    Procedure: Flexible and Rigid Bronchoscopy, Bronchial Washing, Balloon Dilation;  Surgeon: Wilber Lin MD;  [facility]     BRONCHOSCOPY, DILATE BRONCHUS, STENT BRONCHUS, COMBINED N/A 6/6/2019    Procedure: Rigid and Flexible Bronchoscopy, Balloon Dilation;  Surgeon: Wilber Lin MD;  [facility]     BRONCHOSCOPY, DILATE BRONCHUS, STENT BRONCHUS, COMBINED N/A 10/11/2019    Procedure: Flexible and Rigid Bronchoscopy, Balloon Dilation, Bronchoalveolar Lagave;  Surgeon: Wilber Lin MD;  [facility]     BRONCHOSCOPY, DILATE BRONCHUS, STENT BRONCHUS, COMBINED N/A 2/19/2021    Procedure: BRONCHOSCOPY, flexible, airway dilation, and bronchial wash;  Surgeon: Wilber Lin MD;  [facility]     BRONCHOSCOPY, DILATE BRONCHUS, STENT BRONCHUS, COMBINED N/A 4/9/2021    Procedure: BRONCHOSCOPY, flexible and rigid, Airway suctioning;  Surgeon: Mati Norris MD;  [facility]     CV RIGHT HEART CATH MEASUREMENTS RECORDED N/A 3/10/2020    Procedure: CV RIGHT HEART CATH;  Surgeon: Wai Garcia MD;  [facility]     ENT SURGERY      tonsillectomy as a child     ESOPHAGOSCOPY, GASTROSCOPY, DUODENOSCOPY (EGD), COMBINED N/A 10/29/2018    Procedure: COMBINED ESOPHAGOSCOPY, GASTROSCOPY, DUODENOSCOPY (EGD) with biopsies and polypectomy;  Surgeon: Cehnte Bloom MD;  [facility]     INSERT EXTRACORPORAL MEMBRANE OXYGENATOR ADULT (OUTSIDE OR) N/A 2/27/2018    Procedure: INSERT EXTRACORPORAL MEMBRANE OXYGENATOR ADULT (OUTSIDE OR);  INSERT EXTRACORPORAL MEMBRANE  OXYGENATOR ADULT (OUTSIDE OR) ;  Surgeon: Toby Hernandez MD;  [facility]     IR CVC TUNNEL PLACEMENT > 5 YRS OF AGE  10/25/2019     IR DIALYSIS FISTULOGRAM LEFT  3/2/2021     IR DIALYSIS MECH THROMB, PTA  3/2/2021     IR FLUORO 0-1 HOUR  5/7/2021     IR GASTRO JEJUNOSTOMY TUBE PLACEMENT  2/16/2021     IR PARACENTESIS  1/8/2020     IR THORACENTESIS  9/13/2019     IR TRANSCATHETER BIOPSY  1/8/2020     LASER CO2 BRONCHOSCOPY N/A 4/30/2021    Procedure: Flexible and Rigid Bronchoscopy and Tissue Debulking with CO2 Laser Assistance;  Surgeon: Mati Norris MD;  [facility]     LASER CO2 BRONCHOSCOPY N/A 6/11/2021    Procedure: BRONCHOSCOPY, Flexible and Rigid Bronchoscopy, Tissue Debulking with cryo Assistance, airway dilation,;  Surgeon: Mati Norris MD;  [facility]     LASER CO2 BRONCHOSCOPY N/A 9/16/2021    Procedure: BRONCHOSCOPY, flexible and rigid, CO2 Laser Debulking;  Surgeon: Mati Norris MD;  [facility]     LASER CO2 BRONCHOSCOPY N/A 2/11/2022    Procedure: flexible, rigid bronchoscopy, tissue debulking, airway dilation, co2 laser, bronchoalveolar lavage;  Surgeon: Juana Baugh MD;  [facility]     no prior surgery       REMOVE EXTRACORPORAL MEMBRANE OXYGENATOR ADULT N/A 3/3/2018    Procedure: REMOVE EXTRACORPORAL MEMBRANE OXYGENATOR ADULT;  Removal of Right Femoral Venous and Right Axillary Arterial Extracorporeal Membrane Oxygenator;  Surgeon: Toby Hernandez MD;  [facility]     TRANSPLANT LUNG RECIPIENT SINGLE X2 Bilateral 3/1/2018    Procedure: TRANSPLANT LUNG RECIPIENT SINGLE X2;  Median Sternotomy, Extracorporeal Membrane Oxygenator, bilateral sequential lung transplant;  Surgeon: Toby Hernandez MD;  [facility]          Medications  Prior to Admission medications    Medication Sig Start Date End Date Taking? Authorizing Provider   acetaminophen (TYLENOL) 325 MG tablet Take 1 tablet (325 mg) by mouth every 4 hours as needed  for mild pain or fever 2/25/21  Yes Leelee Huang MD   albuterol (PROVENTIL) (2.5 MG/3ML) 0.083% neb solution Take 1 vial (2.5 mg) by nebulization daily 2/10/22  Yes Ame Chow PA-C   azaTHIOprine (IMURAN) 50 MG tablet Take 0.5 tablets (25 mg) by mouth daily 3/3/22  Yes Yenni Graham MD   budesonide (PULMICORT) 0.5 MG/2ML neb solution Take 2 mLs (0.5 mg) by nebulization daily 2/10/22  Yes Ame Chow PA-C   calcium acetate (PHOSLO) 667 MG CAPS capsule Take 1 capsule (667 mg) by mouth 3 times daily (with meals) (largest meal) 5/26/22  Yes Ame Chow PA-C   Magnesium Cl-Calcium Carbonate (SLOW-MAG) 71.5-119 MG TBEC Take 3 tablets by mouth four times a week Take on non dialysis days T/Th/Sa/Su 4/8/21  Yes Yenni Graham MD   metoprolol tartrate (LOPRESSOR) 25 MG tablet 1 tablet (25 mg) by Oral or Feeding Tube route 2 times daily 6/22/22  Yes Ame Chow PA-C   multivitamin RENAL (RENAVITE RX/NEPHROVITE) 1 MG tablet Take 1 tablet by mouth daily 4/26/22  Yes Ame Chow PA-C   pantoprazole (PROTONIX) 40 MG EC tablet Take 1 tablet (40 mg) by mouth every morning (before breakfast) 3/1/22  Yes Ame Chow PA-C   posaconazole (NOXAFIL) 100 MG EC tablet Take 3 tablets (300 mg) by mouth daily 11/15/21  Yes Ame Chow PA-C   predniSONE (DELTASONE) 2.5 MG tablet Take 1 tablet (2.5 mg) by mouth every evening 5/10/22  Yes Ame Chow PA-C   predniSONE (DELTASONE) 5 MG tablet Take 1 tablet (5 mg) by mouth every morning 5/10/22  Yes Ame Chow PA-C   sulfamethoxazole-trimethoprim (BACTRIM) 400-80 MG tablet Take 1 tablet by mouth Every Mon, Wed, Fri Morning 5/4/22  Yes Ame Chow PA-C   tacrolimus (GENERIC EQUIVALENT) 0.5 MG capsule Take 0.5mg in am (1 cap) and 1mg in pm (2 caps) 7/14/22  Yes Ame Chow PA-C       Allergies  No Known Allergies    Review of systems  A 10-point review of systems was  negative    Examination  LMP 06/07/2014 (Exact Date)   There is no height or weight on file to calculate BMI.    Gen- well, NAD, A+Ox3, normal color  Psych- normal mood    Laboratory  Lab Results   Component Value Date     05/26/2022     05/07/2021    POTASSIUM 3.5 05/26/2022    POTASSIUM 4.3 05/07/2021    CHLORIDE 102 08/03/2022    CHLORIDE 102 05/07/2021    CO2 32 05/26/2022    CO2 23 05/07/2021    BUN 42 05/26/2022    BUN 66 05/07/2021    CR 3.98 05/26/2022    CR 3.90 05/07/2021       Lab Results   Component Value Date    BILITOTAL 0.8 10/12/2021    BILITOTAL 0.9 03/29/2021    ALT 52 10/12/2021    ALT 36 03/29/2021    AST 24 10/12/2021    AST 19 03/29/2021    ALKPHOS 316 10/12/2021    ALKPHOS 288 03/29/2021       Lab Results   Component Value Date    ALBUMIN 2.8 10/12/2021    ALBUMIN 3.5 03/29/2021    PROTTOTAL 7.8 10/12/2021    PROTTOTAL 7.2 05/01/2021        Lab Results   Component Value Date    WBC 5.6 05/26/2022    WBC 7.5 05/02/2021    HGB 11.2 05/26/2022    HGB 10.6 05/02/2021     05/26/2022    MCV 97 05/02/2021     05/26/2022     05/02/2021       Lab Results   Component Value Date    INR 0.96 05/26/2022    INR 0.99 04/29/2021       Radiology    ASSESSMENT AND PLAN:  New onset ascites.  Ms. Blue had what appears to be new onset ascites, thought to be related with portal hypertension.  She had a liver biopsy, which showed congestive hepatopathy with moderate zone 3 hepatocellular fibrosis.  This has since improved when her dialysis was optimized.  In fact, she states that she has no more abdominal distention.  She also denies any edema.      Our plan now is to get an abdominal ultrasound to check for any fluid and  if it is present do a diagnostic paracentesis.  She had already seen Cardiology, who thought that she could go forward for the kidney transplantation and that she does not have any impediment to that.  Cardiologically, she had paroxysmal atrial fibrillation.   She had been in the hospital for ARDS.  This has since resolved.  She otherwise follows with Dr. Schaffer.  Mrs. Blue will follow up with her PCP and her pulmonary doctors regarding her healthcare maintenance and she will be seen here in 6 months.    I spent 40 minutes on this encounter of 09/06/2022 in chart reviewing, history taking and documentation.  I spent some of the time also in coordination of care and consult.    Feng Denise MD  Hepatology  [facility]    Video-Visit Details    Video Start Time: 10:40 AM    Type of service:  Video Visit    Video End Time:10:58 AM.    Originating Location (pt. Location): Home    Distant Location (provider location):  [facility] [place]     Platform used for Video Visit: [employer].

## 2022-09-11 ENCOUNTER — HEALTH MAINTENANCE LETTER (OUTPATIENT)
Age: 62
End: 2022-09-11

## 2022-09-21 NOTE — TELEPHONE ENCOUNTER
Per [email or web address] from Dr. Destiney Mejia, \"Good- no changes in medications\"    Writer contacted patient and informed her of message. Patient verbalizes understanding and has no further questions at this time.     Pt to restart AZA at 75 mg daily. Pt will monitor labs weekly.

## 2022-09-26 NOTE — PROGRESS NOTES
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     1. Bilateral lung transplant: no new pulmonary complaints, essentially non mobile secondary to her significant left knee pain, does use a walker when she can. Sating 99% on room air. DSA 5/26 and CMV 8/3 negative. CXR reviewed by me with stable basilar and perihilar opacities. PFTs today have improved to her yearly best. No acute issues.   - Continue AZA 25 mg daily, continue tacrolimus (goal 8-10) and prednisone   - On Bactrim 3 times/week  - Patient is cleared for surgery next week    2. Right main bronchial stenosis s/p ligation: with multiple bronchs in the OR, last included debulking/dilation of the RERE. No new complaints, using her nebs every other day, but wondering if she still needs them.  - Continue albuterol and pulmicort nebs PRN  - Follow up with IP as needed    3. Congestive hepatopathy with fibrosis: complicated by ascites secondary to portal hypertension. Chronic elevation of alk phos, up higher today than recent. Fluid optimization with dialysis. Follows with Dr. Denise.  - Recheck hepatic panel next week  - Posaconazole level pending    4. Left-sided aspergillus empyema: noted 10/8/19. CT scan on 7/17/20 with increased mass-like density in LLL area s/p needle aspiration (8/20) with Aspergillus fumigatus on cultures s/p intrapleural bead placement (amphoterecin, 11/20). S/p chest tube drainage in May 2021. Likely with be on  posaconazole for life per ID.   - Continue posaconazole  - Level pending for today    5. ESRD on iHD (since 10/2019):  HD: BP elevated today at 149/81. Continues now on iHD M/W/F. In the process of getting listed for renal transplant, has to do her stress test.   - Continue metoprolol, renal VMI, phosphate binder    6. Osteoporosis: on DEXA from 5/19 with significant loss of bone in her lumbar spine and femur. Complicated by steroid use and fact that her activity is severely limited by her knee pain. Vitamin D level of 40.  - Continue supplementation  - Endocrine referral reordered today    Chronic issues:  1. Paroxysmal AFib  2. Hypomagnesemia  3. Dermatomyositis with Raynauds    RTC: 4 months  Vaccinations: Evusheld > 11/26; get flu host and bivalent covid booster next week  Preventative: colonoscopy, mammogram and pap due by 2022; DEXA > May 2024; following with Alexia Chow PA-C  Pulmonary, Allergy, Critical Care and Sleep Medicine        Interval History:     Left knee surgery on Monday, pretty much sedentary other than with a walker. Planning for PT/rehab X 2 months after surgery. Breathing is good, no fever or chills. Cough in the am only, occasionally productive, maybe overall improved. No chest pain or palpitations. No new Gi complaints, stools are normal.          Review of Systems:   Please see HPI, otherwise the complete 10 point ROS is negative.           Past Medical and Surgical History:     Past Medical History:   Diagnosis Date     Acute on chronic respiratory failure with hypoxia (H) 02/21/2018     Anisocoria      Antisynthetase syndrome (H) 2014     Anxiety      Aspergillus (H)      Aspergillus pneumonia (H) 11/20/2020     Benign essential hypertension      C. difficile colitis      Cytomegalovirus (CMV) viremia (H)      Dermatomyositis (H)      Dysplasia of cervix, low grade (ESTRADA 1)      EBV (Franklin-Barr virus) viremia      ESRD (end stage renal disease) on dialysis (H)      ILD  (interstitial lung disease) (H)      Lung replaced by transplant (H)      Osteopenia      Paroxysmal atrial fibrillation (H)      Pneumocystis jiroveci pneumonia (H)      PONV (postoperative nausea and vomiting)      Post-menopause      Pulmonary hypertension (H)      Raynaud's disease      Seronegative rheumatoid arthritis (H)      Past Surgical History:   Procedure Laterality Date     BRONCHOSCOPY (RIGID OR FLEXIBLE), DIAGNOSTIC N/A 4/10/2018    Procedure: COMBINED BRONCHOSCOPY (RIGID OR FLEXIBLE), LAVAGE;;  Surgeon: Mariposa Donohue MD;  [facility]     BRONCHOSCOPY (RIGID OR FLEXIBLE), DIAGNOSTIC N/A 12/23/2020    Procedure: BRONCHOSCOPY, WITH BRONCHOALVEOLAR LAVAGE;  Surgeon: Uri Bass MD;  [facility]     BRONCHOSCOPY (RIGID OR FLEXIBLE), DIAGNOSTIC N/A 5/26/2022    Procedure: BRONCHOSCOPY, WITH BRONCHOALVEOLAR LAVAGE;  Surgeon: Uri Bass MD;  [facility]     BRONCHOSCOPY (RIGID OR FLEXIBLE), DILATE BRONCHUS / TRACHEA N/A 10/11/2018    Procedure: BRONCHOSCOPY (RIGID OR FLEXIBLE), DILATE BRONCHUS / TRACHEA;  Flexible And Rigid Bronchoscopy and Dilation;  Surgeon: Wilber Lin MD;  [facility]     BRONCHOSCOPY FLEXIBLE N/A 3/13/2018    Procedure: BRONCHOSCOPY FLEXIBLE;  Flexible Bronchoscopy ;  Surgeon: Gissell Sanchez MD;  [facility]     BRONCHOSCOPY FLEXIBLE N/A 5/9/2018    Procedure: BRONCHOSCOPY FLEXIBLE;;  Surgeon: Wilber Lin MD;  [facility]     BRONCHOSCOPY FLEXIBLE AND RIGID N/A 9/10/2018    Procedure: BRONCHOSCOPY FLEXIBLE AND RIGID;  Flexible and Rigid Bronchoscopy with Balloon Dilation, tissue debulking with cryo, and Right mainstem bronchus stent placement;  Surgeon: Wilber Lin MD;  [facility]     BRONCHOSCOPY RIGID N/A 6/6/2018    Procedure: BRONCHOSCOPY RIGID;;  Surgeon: Lopez Macias MD;  [facility]     BRONCHOSCOPY, DILATE BRONCHUS, STENT BRONCHUS, COMBINED N/A 6/11/2018    Procedure:  COMBINED BRONCHOSCOPY, DILATE BRONCHUS, STENT BRONCHUS;  Flexible Bronchoscopy, Balloon Dilation, Bronchial Washings;  Surgeon: Wilber Lin MD;  [facility]     BRONCHOSCOPY, DILATE BRONCHUS, STENT BRONCHUS, COMBINED Right 7/10/2018    Procedure: COMBINED BRONCHOSCOPY, DILATE BRONCHUS, STENT BRONCHUS;  Flexible Bronchoscopy, Balloon Dilation, Bronchial Washings  ;  Surgeon: Wilber Lin MD;  [facility]     BRONCHOSCOPY, DILATE BRONCHUS, STENT BRONCHUS, COMBINED N/A 8/2/2018    Procedure: COMBINED BRONCHOSCOPY, DILATE BRONCHUS, STENT BRONCHUS;  Flexible Bronchoscopy, Bronchial Washings, Balloon Dilation;  Surgeon: Wilber Lin MD;  [facility]     BRONCHOSCOPY, DILATE BRONCHUS, STENT BRONCHUS, COMBINED N/A 8/20/2018    Procedure: COMBINED BRONCHOSCOPY, DILATE BRONCHUS, STENT BRONCHUS;  Flexible Bronchoscopy, Balloon Dilation;  Surgeon: Wilber Lin MD;  [facility]     BRONCHOSCOPY, DILATE BRONCHUS, STENT BRONCHUS, COMBINED N/A 10/29/2018    Procedure: Flexible Bronchoscopy, Balloon Dilation, Stent Revision, Airway Examination And Therapeutic Suctioning, Cyro Tumor Debulking;  Surgeon: Wilber Lin MD;  [facility]     BRONCHOSCOPY, DILATE BRONCHUS, STENT BRONCHUS, COMBINED N/A 11/20/2018    Procedure: Rigid Bronchoscopy, Stent Removal and dilitation;  Surgeon: Wilber Lin MD;  [facility]     BRONCHOSCOPY, DILATE BRONCHUS, STENT BRONCHUS, COMBINED N/A 12/14/2018    Procedure: Flexible And Rigid Bronchoscopy, Balloon Dilation, Bronchial Washing;  Surgeon: Wilber Lin MD;  [facility]     BRONCHOSCOPY, DILATE BRONCHUS, STENT BRONCHUS, COMBINED N/A 1/17/2019    Procedure: Flexible And Rigid Bronchoscopy, Balloon Dilation.;  Surgeon: Wilber Lin MD;  [facility]     BRONCHOSCOPY, DILATE BRONCHUS, STENT BRONCHUS, COMBINED N/A 3/7/2019    Procedure: Flexible and Rigid Bronchoscopy, Bronchial Washing,  Balloon Dilation;  Surgeon: Wilber Lin MD;  [facility]     BRONCHOSCOPY, DILATE BRONCHUS, STENT BRONCHUS, COMBINED N/A 6/6/2019    Procedure: Rigid and Flexible Bronchoscopy, Balloon Dilation;  Surgeon: Wilber Lin MD;  [facility]     BRONCHOSCOPY, DILATE BRONCHUS, STENT BRONCHUS, COMBINED N/A 10/11/2019    Procedure: Flexible and Rigid Bronchoscopy, Balloon Dilation, Bronchoalveolar Lagave;  Surgeon: Wilber Lin MD;  [facility]     BRONCHOSCOPY, DILATE BRONCHUS, STENT BRONCHUS, COMBINED N/A 2/19/2021    Procedure: BRONCHOSCOPY, flexible, airway dilation, and bronchial wash;  Surgeon: Wilber Lin MD;  [facility]     BRONCHOSCOPY, DILATE BRONCHUS, STENT BRONCHUS, COMBINED N/A 4/9/2021    Procedure: BRONCHOSCOPY, flexible and rigid, Airway suctioning;  Surgeon: Mati Norris MD;  [facility]     CV RIGHT HEART CATH MEASUREMENTS RECORDED N/A 3/10/2020    Procedure: CV RIGHT HEART CATH;  Surgeon: Wai Garcia MD;  [facility]     ENT SURGERY      tonsillectomy as a child     ESOPHAGOSCOPY, GASTROSCOPY, DUODENOSCOPY (EGD), COMBINED N/A 10/29/2018    Procedure: COMBINED ESOPHAGOSCOPY, GASTROSCOPY, DUODENOSCOPY (EGD) with biopsies and polypectomy;  Surgeon: Chente Bloom MD;  [facility]     INSERT EXTRACORPORAL MEMBRANE OXYGENATOR ADULT (OUTSIDE OR) N/A 2/27/2018    Procedure: INSERT EXTRACORPORAL MEMBRANE OXYGENATOR ADULT (OUTSIDE OR);  INSERT EXTRACORPORAL MEMBRANE OXYGENATOR ADULT (OUTSIDE OR) ;  Surgeon: Toby Hernandez MD;  [facility]     IR CVC TUNNEL PLACEMENT > 5 YRS OF AGE  10/25/2019     IR DIALYSIS FISTULOGRAM LEFT  3/2/2021     IR DIALYSIS MECH THROMB, PTA  3/2/2021     IR FLUORO 0-1 HOUR  5/7/2021     IR GASTRO JEJUNOSTOMY TUBE PLACEMENT  2/16/2021     IR PARACENTESIS  1/8/2020     IR THORACENTESIS  9/13/2019     IR TRANSCATHETER BIOPSY  1/8/2020     LASER CO2 BRONCHOSCOPY N/A  4/30/2021    Procedure: Flexible and Rigid Bronchoscopy and Tissue Debulking with CO2 Laser Assistance;  Surgeon: Mati Norris MD;  [facility]     LASER CO2 BRONCHOSCOPY N/A 6/11/2021    Procedure: BRONCHOSCOPY, Flexible and Rigid Bronchoscopy, Tissue Debulking with cryo Assistance, airway dilation,;  Surgeon: Mati Norris MD;  [facility]     LASER CO2 BRONCHOSCOPY N/A 9/16/2021    Procedure: BRONCHOSCOPY, flexible and rigid, CO2 Laser Debulking;  Surgeon: Mati Norris MD;  [facility]     LASER CO2 BRONCHOSCOPY N/A 2/11/2022    Procedure: flexible, rigid bronchoscopy, tissue debulking, airway dilation, co2 laser, bronchoalveolar lavage;  Surgeon: Juana Baugh MD;  [facility]     no prior surgery       REMOVE EXTRACORPORAL MEMBRANE OXYGENATOR ADULT N/A 3/3/2018    Procedure: REMOVE EXTRACORPORAL MEMBRANE OXYGENATOR ADULT;  Removal of Right Femoral Venous and Right Axillary Arterial Extracorporeal Membrane Oxygenator;  Surgeon: Toby Hernandez MD;  [facility]     TRANSPLANT LUNG RECIPIENT SINGLE X2 Bilateral 3/1/2018    Procedure: TRANSPLANT LUNG RECIPIENT SINGLE X2;  Median Sternotomy, Extracorporeal Membrane Oxygenator, bilateral sequential lung transplant;  Surgeon: Toby Hernandez MD;  [facility]           Family History:     Family History   Problem Relation Age of Onset     Hypertension Mother      Arthritis Mother      Pancreatic Cancer Father      Diabetes Father             Social History:     Social History     Socioeconomic History     Marital status:      Spouse name: Not on file     Number of children: Not on file     Years of education: Not on file     Highest education level: Not on file   Occupational History     Not on file   Tobacco Use     Smoking status: Never Smoker     Smokeless tobacco: Never Used   Substance and Sexual Activity     Alcohol use: No     Alcohol/week: 1.0 standard drink     Types: 1 Glasses of wine per week  "    Drug use: No     Sexual activity: Not on file   Other Topics Concern     Parent/sibling w/ CABG, MI or angioplasty before 65F 55M? No   Social History Narrative    3/6/2019 - Lives with . Has three daughters. Four grandchildren (two ). No pets. Travelled previously to [facility]. Has visited [place] several times.      Social Determinants of Health     Financial Resource Strain: Not on file   Food Insecurity: Not on file   Transportation Needs: Not on file   Physical Activity: Not on file   Stress: Not on file   Social Connections: Not on file   Intimate Partner Violence: Not on file   Housing Stability: Not on file            Medications:     Current Outpatient Medications   Medication     acetaminophen (TYLENOL) 325 MG tablet     albuterol (PROVENTIL) (2.5 MG/3ML) 0.083% neb solution     azaTHIOprine (IMURAN) 50 MG tablet     budesonide (PULMICORT) 0.5 MG/2ML neb solution     calcium acetate (PHOSLO) 667 MG CAPS capsule     Magnesium Cl-Calcium Carbonate (SLOW-MAG) 71.5-119 MG TBEC     metoprolol tartrate (LOPRESSOR) 25 MG tablet     multivitamin RENAL (RENAVITE RX/NEPHROVITE) 1 MG tablet     pantoprazole (PROTONIX) 40 MG EC tablet     posaconazole (NOXAFIL) 100 MG EC tablet     predniSONE (DELTASONE) 2.5 MG tablet     predniSONE (DELTASONE) 5 MG tablet     sulfamethoxazole-trimethoprim (BACTRIM) 400-80 MG tablet     tacrolimus (GENERIC EQUIVALENT) 0.5 MG capsule     No current facility-administered medications for this visit.            Physical Exam:   BP (!) 149/81 (BP Location: Right arm, Patient Position: Chair, Cuff Size: Adult Regular)   Pulse 95   Ht 1.626 m (5' 4\")   Wt 58.1 kg (128 lb)   LMP 2014 (Exact Date)   SpO2 99%   BMI 21.97 kg/m      GENERAL: alert, NAD  HEENT: NCAT, EOMI, no scleral icterus, oral mucosa moist and without lesions  Neck: no cervical or supraclavicular adenopathy  Lungs: moderate airflow, bilateral mid lung expiratory wheeze, clear  CV: irregular " rhythm, S1S2, no murmurs noted  Abdomen: normoactive BS, soft  Lymph:trace BLE edema  Neuro: AAO X 3, CN 2-12 grossly intact  Psychiatric: normal affect, good eye contact  Skin: no rash, jaundice or lesions on limited exam  MSK: left knee in brace         Data:   All laboratory and imaging data reviewed.      Recent Results (from the past 168 hour(s))   Magnesium    Collection Time: 09/27/22 10:12 AM   Result Value Ref Range    Magnesium 2.1 1.7 - 2.3 mg/dL   CBC with platelets    Collection Time: 09/27/22 10:12 AM   Result Value Ref Range    WBC Count 6.2 4.0 - 11.0 10e3/uL    RBC Count 3.57 (L) 3.80 - 5.20 10e6/uL    Hemoglobin 11.3 (L) 11.7 - 15.7 g/dL    Hematocrit 35.5 35.0 - 47.0 %    MCV 99 78 - 100 fL    MCH 31.7 26.5 - 33.0 pg    MCHC 31.8 31.5 - 36.5 g/dL    RDW 15.9 (H) 10.0 - 15.0 %    Platelet Count 202 150 - 450 10e3/uL   Comprehensive metabolic panel    Collection Time: 09/27/22 10:12 AM   Result Value Ref Range    Sodium 137 136 - 145 mmol/L    Potassium 4.1 3.4 - 5.3 mmol/L    Chloride 94 (L) 98 - 107 mmol/L    Carbon Dioxide (CO2) 30 (H) 22 - 29 mmol/L    Anion Gap 13 7 - 15 mmol/L    Urea Nitrogen 44.0 (H) 8.0 - 23.0 mg/dL    Creatinine 4.29 (H) 0.51 - 0.95 mg/dL    Calcium 9.8 8.6 - 10.0 mg/dL    Glucose 131 (H) 70 - 99 mg/dL    Alkaline Phosphatase 425 (H) 35 - 104 U/L    AST 33 10 - 35 U/L    ALT 64 (H) 10 - 35 U/L    Protein Total 7.9 6.4 - 8.3 g/dL    Albumin 4.0 3.5 - 5.2 g/dL    Bilirubin Total 0.6 <=1.2 mg/dL    GFR Estimate 11 (L) >60 mL/min/1.73m2   Bilirubin direct    Collection Time: 09/27/22 10:12 AM   Result Value Ref Range    Bilirubin Direct 0.31 (H) 0.00 - 0.30 mg/dL   General PFT Lab (Please always keep checked)    Collection Time: 09/27/22 10:31 AM   Result Value Ref Range    FVC-Pred 3.20 L    FVC-Pre 1.61 L    FVC-%Pred-Pre 50 %    FEV1-Pre 1.32 L    FEV1-%Pred-Pre 52 %    FEV1FVC-Pred 79 %    FEV1FVC-Pre 82 %    FEFMax-Pred 6.35 L/sec    FEFMax-Pre 3.40 L/sec     FEFMax-%Pred-Pre 53 %    FEF2575-Pred 2.32 L/sec    FEF2575-Pre 1.13 L/sec    YYV3340-%Pred-Pre 48 %    ExpTime-Pre 5.92 sec    FIFMax-Pre 3.04 L/sec    FEV1FEV6-Pred 81 %    FEV1FEV6-Pre 82 %     PFT interpretation:  Maneuver: valid and met ATS guidelines  Normal ratio with decreased FEV1 and FVC  Compared to prior: FEV1 of 1.32 is 70 ml above prior  Decrease in FVC is likely related to restriction; lung volumes would be necessary to determine

## 2022-09-27 ENCOUNTER — LAB (OUTPATIENT)
Dept: LAB | Facility: CLINIC | Age: 62
End: 2022-09-27
Attending: PHYSICIAN ASSISTANT
Payer: MEDICARE

## 2022-09-27 ENCOUNTER — OFFICE VISIT (OUTPATIENT)
Dept: PULMONOLOGY | Facility: CLINIC | Age: 62
End: 2022-09-27
Attending: PHYSICIAN ASSISTANT
Payer: MEDICARE

## 2022-09-27 VITALS
DIASTOLIC BLOOD PRESSURE: 81 MMHG | HEART RATE: 95 BPM | BODY MASS INDEX: 21.85 KG/M2 | WEIGHT: 128 LBS | SYSTOLIC BLOOD PRESSURE: 149 MMHG | HEIGHT: 64 IN | OXYGEN SATURATION: 99 %

## 2022-09-27 DIAGNOSIS — D84.9 IMMUNOSUPPRESSED STATUS (H): Primary | ICD-10-CM

## 2022-09-27 DIAGNOSIS — Z94.2 S/P LUNG TRANSPLANT (H): ICD-10-CM

## 2022-09-27 DIAGNOSIS — M81.8 OTHER OSTEOPOROSIS WITHOUT CURRENT PATHOLOGICAL FRACTURE: ICD-10-CM

## 2022-09-27 DIAGNOSIS — Z94.2 LUNG REPLACED BY TRANSPLANT (H): ICD-10-CM

## 2022-09-27 DIAGNOSIS — I10 ESSENTIAL HYPERTENSION: ICD-10-CM

## 2022-09-27 DIAGNOSIS — K76.89 LIVER DYSFUNCTION: ICD-10-CM

## 2022-09-27 DIAGNOSIS — Z01.818 PRE-TRANSPLANT EVALUATION FOR KIDNEY TRANSPLANT: ICD-10-CM

## 2022-09-27 DIAGNOSIS — N18.5 CHRONIC KIDNEY DISEASE, STAGE V (H): ICD-10-CM

## 2022-09-27 DIAGNOSIS — J98.4 DISEASE OF LUNG: ICD-10-CM

## 2022-09-27 LAB
ALBUMIN SERPL BCG-MCNC: 4 G/DL (ref 3.5–5.2)
ALP SERPL-CCNC: 425 U/L (ref 35–104)
ALT SERPL W P-5'-P-CCNC: 64 U/L (ref 10–35)
ANION GAP SERPL CALCULATED.3IONS-SCNC: 13 MMOL/L (ref 7–15)
AST SERPL W P-5'-P-CCNC: 33 U/L (ref 10–35)
BILIRUB DIRECT SERPL-MCNC: 0.31 MG/DL (ref 0–0.3)
BILIRUB SERPL-MCNC: 0.6 MG/DL
BUN SERPL-MCNC: 44 MG/DL (ref 8–23)
CALCIUM SERPL-MCNC: 9.8 MG/DL (ref 8.6–10)
CHLORIDE SERPL-SCNC: 94 MMOL/L (ref 98–107)
CREAT SERPL-MCNC: 4.29 MG/DL (ref 0.51–0.95)
DEPRECATED HCO3 PLAS-SCNC: 30 MMOL/L (ref 22–29)
ERYTHROCYTE [DISTWIDTH] IN BLOOD BY AUTOMATED COUNT: 15.9 % (ref 10–15)
EXPTIME-PRE: 5.92 SEC
FEF2575-%PRED-PRE: 48 %
FEF2575-PRE: 1.13 L/SEC
FEF2575-PRED: 2.32 L/SEC
FEFMAX-%PRED-PRE: 53 %
FEFMAX-PRE: 3.4 L/SEC
FEFMAX-PRED: 6.35 L/SEC
FEV1-%PRED-PRE: 52 %
FEV1-PRE: 1.32 L
FEV1FEV6-PRE: 82 %
FEV1FEV6-PRED: 81 %
FEV1FVC-PRE: 82 %
FEV1FVC-PRED: 79 %
FIFMAX-PRE: 3.04 L/SEC
FVC-%PRED-PRE: 50 %
FVC-PRE: 1.61 L
FVC-PRED: 3.2 L
GFR SERPL CREATININE-BSD FRML MDRD: 11 ML/MIN/1.73M2
GLUCOSE SERPL-MCNC: 131 MG/DL (ref 70–99)
HCT VFR BLD AUTO: 35.5 % (ref 35–47)
HGB BLD-MCNC: 11.3 G/DL (ref 11.7–15.7)
MAGNESIUM SERPL-MCNC: 2.1 MG/DL (ref 1.7–2.3)
MCH RBC QN AUTO: 31.7 PG (ref 26.5–33)
MCHC RBC AUTO-ENTMCNC: 31.8 G/DL (ref 31.5–36.5)
MCV RBC AUTO: 99 FL (ref 78–100)
PLATELET # BLD AUTO: 202 10E3/UL (ref 150–450)
POTASSIUM SERPL-SCNC: 4.1 MMOL/L (ref 3.4–5.3)
PROT SERPL-MCNC: 7.9 G/DL (ref 6.4–8.3)
RBC # BLD AUTO: 3.57 10E6/UL (ref 3.8–5.2)
SODIUM SERPL-SCNC: 137 MMOL/L (ref 136–145)
TACROLIMUS BLD-MCNC: 8.2 UG/L (ref 5–15)
TME LAST DOSE: NORMAL H
TME LAST DOSE: NORMAL H
WBC # BLD AUTO: 6.2 10E3/UL (ref 4–11)

## 2022-09-27 PROCEDURE — 80053 COMPREHEN METABOLIC PANEL: CPT | Performed by: PATHOLOGY

## 2022-09-27 PROCEDURE — G0463 HOSPITAL OUTPT CLINIC VISIT: HCPCS | Mod: 25

## 2022-09-27 PROCEDURE — 80187 DRUG ASSAY POSACONAZOLE: CPT | Performed by: PHYSICIAN ASSISTANT

## 2022-09-27 PROCEDURE — 82248 BILIRUBIN DIRECT: CPT | Performed by: PATHOLOGY

## 2022-09-27 PROCEDURE — 99214 OFFICE O/P EST MOD 30 MIN: CPT | Mod: 25 | Performed by: PHYSICIAN ASSISTANT

## 2022-09-27 PROCEDURE — 36415 COLL VENOUS BLD VENIPUNCTURE: CPT | Performed by: PATHOLOGY

## 2022-09-27 PROCEDURE — 85027 COMPLETE CBC AUTOMATED: CPT | Performed by: PATHOLOGY

## 2022-09-27 PROCEDURE — 83735 ASSAY OF MAGNESIUM: CPT | Performed by: PATHOLOGY

## 2022-09-27 PROCEDURE — 99000 SPECIMEN HANDLING OFFICE-LAB: CPT | Performed by: PATHOLOGY

## 2022-09-27 PROCEDURE — 84080 ASSAY ALKALINE PHOSPHATASES: CPT | Mod: 90 | Performed by: PATHOLOGY

## 2022-09-27 PROCEDURE — 87799 DETECT AGENT NOS DNA QUANT: CPT | Performed by: PHYSICIAN ASSISTANT

## 2022-09-27 PROCEDURE — 80197 ASSAY OF TACROLIMUS: CPT | Performed by: PHYSICIAN ASSISTANT

## 2022-09-27 PROCEDURE — 94375 RESPIRATORY FLOW VOLUME LOOP: CPT | Performed by: PHYSICIAN ASSISTANT

## 2022-09-27 ASSESSMENT — PAIN SCALES - GENERAL: PAINLEVEL: NO PAIN (0)

## 2022-09-27 NOTE — NURSING NOTE
Transplant Coordinator Note    Reason for visit: Post lung transplant follow up visit   Coordinator: Present   Caregiver:  Pt's significant otherRanjan    iCrumz concerns addressed today:  1. Left knee surgery next Monday  2. Respiratory: at baseline; intermittent cough  3. GI/: no concerns; having regular BMs  4. Continues on dialysis    Activity/rehab: Uses a walker with activity - will work with PT after surgery  Oxygen needs: Room air  Pain management/RX: Denies  High risk donor: Yes  CMV status: D+/R+  DVT/PE: H/o DVT  Post op AFIB/follow up with EP: One time occurrence of a-fib  PJP prophylactic: Bactrim    COVID:  1. COVID-19 infection (yes/no, date of most recent positive test):   2. Status/instructions given about COVID-19 vaccine:     Pt Education: medications (use/dose/side effects), how/when to call coordinator, frequency of labs, s/s of infection/rejection, call prior to starting any new medications, lab/vital sign book    Health Maintenance:     Last colonoscopy:     Next colonoscopy due:     Dermatology:     Vaccinations this visit:     Labs, CXR, PFTs reviewed with patient  Medication record reviewed and reconciled  Questions and concerns addressed    Patient Instructions  1. Continue to hydrate with 60-70 oz fluids daily.  2. Continue to exercise daily or most days of the week.  3. Follow up with your primary care provider for annual gender health maintenance procedures.  4. Follow up with colonoscopy schedule.  5. Follow up with annual dermatology visits.  6. It doesn't seem like the COVID vaccine is working well in lung transplant patients. A number of lung transplant patients have gotten sick with COVID even after receiving the vaccines.  Based on our recent experience, it can be life-threatening to get COVID  even after being vaccinated. Please continue to act like you did not get the COVID vaccine - social distancing, wearing a mask, good hand hygiene, etc. If the people around you are  vaccinated, it will help reduce the risk of you getting COVID. All members of your household should be vaccinated.  7. Get the flu shot in the next couple of weeks.  Get the bivalent COVID booster as well.  8. Let us know in the next couple of weeks if you don't get an appointment with endocrine.  9. You are due for Evpriscillaeld on or after November 26th, 2022.    Next transplant clinic appointment:  4 months with CXR, labs and PFTs  Next lab draw: one week, then monthly      AVS printed at time of check out

## 2022-09-27 NOTE — LETTER
Date:September 28, 2022      Patient was self referred, no letter generated. Do not send.        [facility] Health Information

## 2022-09-27 NOTE — LETTER
9/27/2022         RE: Kecia Blue  [address] [place] [address]        Dear Colleague,    Thank you for referring your patient, Kecia Blue, to the [facility] [place]. Please see a copy of my visit note below.    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     1. Bilateral lung transplant: no new pulmonary complaints, essentially non mobile secondary to her significant left knee pain, does use a walker when she can. Sating 99% on room air. DSA 5/26 and CMV 8/3 negative. CXR reviewed by me with stable basilar and perihilar opacities. PFTs today have improved to her yearly best. No acute issues.   - Continue AZA 25 mg daily, continue tacrolimus (goal 8-10) and prednisone   - On Bactrim 3 times/week  - Patient is cleared for surgery next week    2. Right main bronchial stenosis s/p ligation: with multiple bronchs in the OR, last included debulking/dilation of the RERE. No new complaints, using her nebs every other day, but wondering if she still needs them.  - Continue albuterol and pulmicort nebs PRN  - Follow up with IP as needed    3. Congestive hepatopathy with fibrosis: complicated by ascites secondary to portal hypertension. Chronic elevation of alk phos, up higher today than recent. Fluid optimization with dialysis. Follows with Dr. Denise.  - Recheck hepatic panel next week  - Posaconazole level  pending    4. Left-sided aspergillus empyema: noted 10/8/19. CT scan on 7/17/20 with increased mass-like density in LLL area s/p needle aspiration (8/20) with Aspergillus fumigatus on cultures s/p intrapleural bead placement (amphoterecin, 11/20). S/p chest tube drainage in May 2021. Likely with be on posaconazole for life per ID.   - Continue posaconazole  - Level pending for today    5. ESRD on iHD (since 10/2019):  HD: BP elevated today at 149/81. Continues now on iHD M/W/F. In the process of getting listed for renal transplant, has to do her stress test.   - Continue metoprolol, renal VMI, phosphate binder    6. Osteoporosis: on DEXA from 5/19 with significant loss of bone in her lumbar spine and femur. Complicated by steroid use and fact that her activity is severely limited by her knee pain. Vitamin D level of 40.  - Continue supplementation  - Endocrine referral reordered today    Chronic issues:  1. Paroxysmal AFib  2. Hypomagnesemia  3. Dermatomyositis with Raynauds    RTC: 4 months  Vaccinations: Evusheld > 11/26; get flu host and bivalent covid booster next week  Preventative: colonoscopy, mammogram and pap due by 2022; DEXA > May 2024; following with Alexia Chow PA-C  Pulmonary, Allergy, Critical Care and Sleep Medicine        Interval History:     Left knee surgery on Monday, pretty much sedentary other than with a walker. Planning for PT/rehab X 2 months after surgery. Breathing is good, no fever or chills. Cough in the am only, occasionally productive, maybe overall improved. No chest pain or palpitations. No new Gi complaints, stools are normal.          Review of Systems:   Please see HPI, otherwise the complete 10 point ROS is negative.           Past Medical and Surgical History:     Past Medical History:   Diagnosis Date     Acute on chronic respiratory failure with hypoxia (H) 02/21/2018     Anisocoria      Antisynthetase syndrome (H) 2014     Anxiety      Aspergillus (H)       Aspergillus pneumonia (H) 11/20/2020     Benign essential hypertension      C. difficile colitis      Cytomegalovirus (CMV) viremia (H)      Dermatomyositis (H)      Dysplasia of cervix, low grade (ESTRADA 1)      EBV (Franklin-Barr virus) viremia      ESRD (end stage renal disease) on dialysis (H)      ILD (interstitial lung disease) (H)      Lung replaced by transplant (H)      Osteopenia      Paroxysmal atrial fibrillation (H)      Pneumocystis jiroveci pneumonia (H)      PONV (postoperative nausea and vomiting)      Post-menopause      Pulmonary hypertension (H)      Raynaud's disease      Seronegative rheumatoid arthritis (H)      Past Surgical History:   Procedure Laterality Date     BRONCHOSCOPY (RIGID OR FLEXIBLE), DIAGNOSTIC N/A 4/10/2018    Procedure: COMBINED BRONCHOSCOPY (RIGID OR FLEXIBLE), LAVAGE;;  Surgeon: Mariposa Donohue MD;  [facility]: [facility]     BRONCHOSCOPY (RIGID OR FLEXIBLE), DIAGNOSTIC N/A 12/23/2020    Procedure: BRONCHOSCOPY, WITH BRONCHOALVEOLAR LAVAGE;  Surgeon: Uri Bass MD;  [facility]     BRONCHOSCOPY (RIGID OR FLEXIBLE), DIAGNOSTIC N/A 5/26/2022    Procedure: BRONCHOSCOPY, WITH BRONCHOALVEOLAR LAVAGE;  Surgeon: Uri Bass MD;  [facility]     BRONCHOSCOPY (RIGID OR FLEXIBLE), DILATE BRONCHUS / TRACHEA N/A 10/11/2018    Procedure: BRONCHOSCOPY (RIGID OR FLEXIBLE), DILATE BRONCHUS / TRACHEA;  Flexible And Rigid Bronchoscopy and Dilation;  Surgeon: Wilber Lin MD;  [facility]     BRONCHOSCOPY FLEXIBLE N/A 3/13/2018    Procedure: BRONCHOSCOPY FLEXIBLE;  Flexible Bronchoscopy ;  Surgeon: Gissell Sanchez MD;  [facility]     BRONCHOSCOPY FLEXIBLE N/A 5/9/2018    Procedure: BRONCHOSCOPY FLEXIBLE;;  Surgeon: Wilber Lin MD;  [facility]     BRONCHOSCOPY FLEXIBLE AND RIGID N/A 9/10/2018    Procedure: BRONCHOSCOPY FLEXIBLE AND RIGID;  Flexible and Rigid Bronchoscopy with Balloon Dilation, tissue debulking with cryo, and Right  mainstem bronchus stent placement;  Surgeon: Wilber Lin MD;  [facility]     BRONCHOSCOPY RIGID N/A 6/6/2018    Procedure: BRONCHOSCOPY RIGID;;  Surgeon: Lopez Macias MD;  [facility]     BRONCHOSCOPY, DILATE BRONCHUS, STENT BRONCHUS, COMBINED N/A 6/11/2018    Procedure: COMBINED BRONCHOSCOPY, DILATE BRONCHUS, STENT BRONCHUS;  Flexible Bronchoscopy, Balloon Dilation, Bronchial Washings;  Surgeon: Wilber Lin MD;  [facility]     BRONCHOSCOPY, DILATE BRONCHUS, STENT BRONCHUS, COMBINED Right 7/10/2018    Procedure: COMBINED BRONCHOSCOPY, DILATE BRONCHUS, STENT BRONCHUS;  Flexible Bronchoscopy, Balloon Dilation, Bronchial Washings  ;  Surgeon: Wilber Lin MD;  [facility]     BRONCHOSCOPY, DILATE BRONCHUS, STENT BRONCHUS, COMBINED N/A 8/2/2018    Procedure: COMBINED BRONCHOSCOPY, DILATE BRONCHUS, STENT BRONCHUS;  Flexible Bronchoscopy, Bronchial Washings, Balloon Dilation;  Surgeon: Wilber Lin MD;  [facility]     BRONCHOSCOPY, DILATE BRONCHUS, STENT BRONCHUS, COMBINED N/A 8/20/2018    Procedure: COMBINED BRONCHOSCOPY, DILATE BRONCHUS, STENT BRONCHUS;  Flexible Bronchoscopy, Balloon Dilation;  Surgeon: Wilber Lin MD;  [facility]     BRONCHOSCOPY, DILATE BRONCHUS, STENT BRONCHUS, COMBINED N/A 10/29/2018    Procedure: Flexible Bronchoscopy, Balloon Dilation, Stent Revision, Airway Examination And Therapeutic Suctioning, Cyro Tumor Debulking;  Surgeon: Wilber Lin MD;  [facility]     BRONCHOSCOPY, DILATE BRONCHUS, STENT BRONCHUS, COMBINED N/A 11/20/2018    Procedure: Rigid Bronchoscopy, Stent Removal and dilitation;  Surgeon: Wilber Lin MD;  [facility]     BRONCHOSCOPY, DILATE BRONCHUS, STENT BRONCHUS, COMBINED N/A 12/14/2018    Procedure: Flexible And Rigid Bronchoscopy, Balloon Dilation, Bronchial Washing;  Surgeon: Wilber Lin MD;  [facility]     BRONCHOSCOPY, DILATE BRONCHUS,  STENT BRONCHUS, COMBINED N/A 1/17/2019    Procedure: Flexible And Rigid Bronchoscopy, Balloon Dilation.;  Surgeon: Wilber Lin MD;  [facility]     BRONCHOSCOPY, DILATE BRONCHUS, STENT BRONCHUS, COMBINED N/A 3/7/2019    Procedure: Flexible and Rigid Bronchoscopy, Bronchial Washing, Balloon Dilation;  Surgeon: Wilber Lin MD;  [facility]     BRONCHOSCOPY, DILATE BRONCHUS, STENT BRONCHUS, COMBINED N/A 6/6/2019    Procedure: Rigid and Flexible Bronchoscopy, Balloon Dilation;  Surgeon: Wilber Lin MD;  [facility]     BRONCHOSCOPY, DILATE BRONCHUS, STENT BRONCHUS, COMBINED N/A 10/11/2019    Procedure: Flexible and Rigid Bronchoscopy, Balloon Dilation, Bronchoalveolar Lagave;  Surgeon: Wilber Lin MD;  [facility]     BRONCHOSCOPY, DILATE BRONCHUS, STENT BRONCHUS, COMBINED N/A 2/19/2021    Procedure: BRONCHOSCOPY, flexible, airway dilation, and bronchial wash;  Surgeon: Wilber Lin MD;  [facility]     BRONCHOSCOPY, DILATE BRONCHUS, STENT BRONCHUS, COMBINED N/A 4/9/2021    Procedure: BRONCHOSCOPY, flexible and rigid, Airway suctioning;  Surgeon: Mati Norris MD;  [facility]     CV RIGHT HEART CATH MEASUREMENTS RECORDED N/A 3/10/2020    Procedure: CV RIGHT HEART CATH;  Surgeon: Wai Garcia MD;  Location:  HEART CARDIAC CATH LAB     ENT SURGERY      tonsillectomy as a child     ESOPHAGOSCOPY, GASTROSCOPY, DUODENOSCOPY (EGD), COMBINED N/A 10/29/2018    Procedure: COMBINED ESOPHAGOSCOPY, GASTROSCOPY, DUODENOSCOPY (EGD) with biopsies and polypectomy;  Surgeon: Chente Bloom MD;  [facility]     INSERT EXTRACORPORAL MEMBRANE OXYGENATOR ADULT (OUTSIDE OR) N/A 2/27/2018    Procedure: INSERT EXTRACORPORAL MEMBRANE OXYGENATOR ADULT (OUTSIDE OR);  INSERT EXTRACORPORAL MEMBRANE OXYGENATOR ADULT (OUTSIDE OR) ;  Surgeon: Toby Hernandez MD;  [facility]     IR CVC TUNNEL PLACEMENT > 5 YRS OF AGE  10/25/2019      IR DIALYSIS FISTULOGRAM LEFT  3/2/2021     IR DIALYSIS MECH THROMB, PTA  3/2/2021     IR FLUORO 0-1 HOUR  5/7/2021     IR GASTRO JEJUNOSTOMY TUBE PLACEMENT  2/16/2021     IR PARACENTESIS  1/8/2020     IR THORACENTESIS  9/13/2019     IR TRANSCATHETER BIOPSY  1/8/2020     LASER CO2 BRONCHOSCOPY N/A 4/30/2021    Procedure: Flexible and Rigid Bronchoscopy and Tissue Debulking with CO2 Laser Assistance;  Surgeon: Mati Norris MD;  [facility]     LASER CO2 BRONCHOSCOPY N/A 6/11/2021    Procedure: BRONCHOSCOPY, Flexible and Rigid Bronchoscopy, Tissue Debulking with cryo Assistance, airway dilation,;  Surgeon: Mati Norris MD;  [facility]     LASER CO2 BRONCHOSCOPY N/A 9/16/2021    Procedure: BRONCHOSCOPY, flexible and rigid, CO2 Laser Debulking;  Surgeon: Mati Norris MD;  [facility]     LASER CO2 BRONCHOSCOPY N/A 2/11/2022    Procedure: flexible, rigid bronchoscopy, tissue debulking, airway dilation, co2 laser, bronchoalveolar lavage;  Surgeon: Juana Baugh MD;  [facility]     no prior surgery       REMOVE EXTRACORPORAL MEMBRANE OXYGENATOR ADULT N/A 3/3/2018    Procedure: REMOVE EXTRACORPORAL MEMBRANE OXYGENATOR ADULT;  Removal of Right Femoral Venous and Right Axillary Arterial Extracorporeal Membrane Oxygenator;  Surgeon: Toby Hernandez MD;  [facility]     TRANSPLANT LUNG RECIPIENT SINGLE X2 Bilateral 3/1/2018    Procedure: TRANSPLANT LUNG RECIPIENT SINGLE X2;  Median Sternotomy, Extracorporeal Membrane Oxygenator, bilateral sequential lung transplant;  Surgeon: Toby Hernandez MD;  [facility]           Family History:     Family History   Problem Relation Age of Onset     Hypertension Mother      Arthritis Mother      Pancreatic Cancer Father      Diabetes Father             Social History:     Social History     Socioeconomic History     Marital status:      Spouse name: Not on file     Number of children: Not on file     Years of education:  "Not on file     Highest education level: Not on file   Occupational History     Not on file   Tobacco Use     Smoking status: Never Smoker     Smokeless tobacco: Never Used   Substance and Sexual Activity     Alcohol use: No     Alcohol/week: 1.0 standard drink     Types: 1 Glasses of wine per week     Drug use: No     Sexual activity: Not on file   Other Topics Concern     Parent/sibling w/ CABG, MI or angioplasty before 65F 55M? No   Social History Narrative    3/6/2019 - Lives with . Has three daughters. Four grandchildren (two ). No pets. Travelled previously to [place]. Has visited [place] several times.      Social Determinants of Health     Financial Resource Strain: Not on file   Food Insecurity: Not on file   Transportation Needs: Not on file   Physical Activity: Not on file   Stress: Not on file   Social Connections: Not on file   Intimate Partner Violence: Not on file   Housing Stability: Not on file            Medications:     Current Outpatient Medications   Medication     acetaminophen (TYLENOL) 325 MG tablet     albuterol (PROVENTIL) (2.5 MG/3ML) 0.083% neb solution     azaTHIOprine (IMURAN) 50 MG tablet     budesonide (PULMICORT) 0.5 MG/2ML neb solution     calcium acetate (PHOSLO) 667 MG CAPS capsule     Magnesium Cl-Calcium Carbonate (SLOW-MAG) 71.5-119 MG TBEC     metoprolol tartrate (LOPRESSOR) 25 MG tablet     multivitamin RENAL (RENAVITE RX/NEPHROVITE) 1 MG tablet     pantoprazole (PROTONIX) 40 MG EC tablet     posaconazole (NOXAFIL) 100 MG EC tablet     predniSONE (DELTASONE) 2.5 MG tablet     predniSONE (DELTASONE) 5 MG tablet     sulfamethoxazole-trimethoprim (BACTRIM) 400-80 MG tablet     tacrolimus (GENERIC EQUIVALENT) 0.5 MG capsule     No current facility-administered medications for this visit.            Physical Exam:   BP (!) 149/81 (BP Location: Right arm, Patient Position: Chair, Cuff Size: Adult Regular)   Pulse 95   Ht 1.626 m (5' 4\")   Wt 58.1 kg (128 " lb)   LMP 06/07/2014 (Exact Date)   SpO2 99%   BMI 21.97 kg/m      GENERAL: alert, NAD  HEENT: NCAT, EOMI, no scleral icterus, oral mucosa moist and without lesions  Neck: no cervical or supraclavicular adenopathy  Lungs: moderate airflow, bilateral mid lung expiratory wheeze, clear  CV: irregular rhythm, S1S2, no murmurs noted  Abdomen: normoactive BS, soft  Lymph:trace BLE edema  Neuro: AAO X 3, CN 2-12 grossly intact  Psychiatric: normal affect, good eye contact  Skin: no rash, jaundice or lesions on limited exam  MSK: left knee in brace         Data:   All laboratory and imaging data reviewed.      Recent Results (from the past 168 hour(s))   Magnesium    Collection Time: 09/27/22 10:12 AM   Result Value Ref Range    Magnesium 2.1 1.7 - 2.3 mg/dL   CBC with platelets    Collection Time: 09/27/22 10:12 AM   Result Value Ref Range    WBC Count 6.2 4.0 - 11.0 10e3/uL    RBC Count 3.57 (L) 3.80 - 5.20 10e6/uL    Hemoglobin 11.3 (L) 11.7 - 15.7 g/dL    Hematocrit 35.5 35.0 - 47.0 %    MCV 99 78 - 100 fL    MCH 31.7 26.5 - 33.0 pg    MCHC 31.8 31.5 - 36.5 g/dL    RDW 15.9 (H) 10.0 - 15.0 %    Platelet Count 202 150 - 450 10e3/uL   Comprehensive metabolic panel    Collection Time: 09/27/22 10:12 AM   Result Value Ref Range    Sodium 137 136 - 145 mmol/L    Potassium 4.1 3.4 - 5.3 mmol/L    Chloride 94 (L) 98 - 107 mmol/L    Carbon Dioxide (CO2) 30 (H) 22 - 29 mmol/L    Anion Gap 13 7 - 15 mmol/L    Urea Nitrogen 44.0 (H) 8.0 - 23.0 mg/dL    Creatinine 4.29 (H) 0.51 - 0.95 mg/dL    Calcium 9.8 8.6 - 10.0 mg/dL    Glucose 131 (H) 70 - 99 mg/dL    Alkaline Phosphatase 425 (H) 35 - 104 U/L    AST 33 10 - 35 U/L    ALT 64 (H) 10 - 35 U/L    Protein Total 7.9 6.4 - 8.3 g/dL    Albumin 4.0 3.5 - 5.2 g/dL    Bilirubin Total 0.6 <=1.2 mg/dL    GFR Estimate 11 (L) >60 mL/min/1.73m2   Bilirubin direct    Collection Time: 09/27/22 10:12 AM   Result Value Ref Range    Bilirubin Direct 0.31 (H) 0.00 - 0.30 mg/dL   General PFT Lab  (Please always keep checked)    Collection Time: 09/27/22 10:31 AM   Result Value Ref Range    FVC-Pred 3.20 L    FVC-Pre 1.61 L    FVC-%Pred-Pre 50 %    FEV1-Pre 1.32 L    FEV1-%Pred-Pre 52 %    FEV1FVC-Pred 79 %    FEV1FVC-Pre 82 %    FEFMax-Pred 6.35 L/sec    FEFMax-Pre 3.40 L/sec    FEFMax-%Pred-Pre 53 %    FEF2575-Pred 2.32 L/sec    FEF2575-Pre 1.13 L/sec    EDB5028-%Pred-Pre 48 %    ExpTime-Pre 5.92 sec    FIFMax-Pre 3.04 L/sec    FEV1FEV6-Pred 81 %    FEV1FEV6-Pre 82 %     PFT interpretation:  Maneuver: valid and met ATS guidelines  Normal ratio with decreased FEV1 and FVC  Compared to prior: FEV1 of 1.32 is 70 ml above prior  Decrease in FVC is likely related to restriction; lung volumes would be necessary to determine      Again, thank you for allowing me to participate in the care of your patient.        Sincerely,        Ame Chow PA-C

## 2022-09-27 NOTE — NURSING NOTE
Chief Complaint   Patient presents with     Lung Transplant     Lung TXP      Vitals were taken and medications were reconciled.     Cecille Reed RMA  11:09 AM

## 2022-09-27 NOTE — PATIENT INSTRUCTIONS
Patient Instructions  1. Continue to hydrate with 60-70 oz fluids daily.  2. Continue to exercise daily or most days of the week.  3. Follow up with your primary care provider for annual gender health maintenance procedures.  4. Follow up with colonoscopy schedule.  5. Follow up with annual dermatology visits.  6. It doesn't seem like the COVID vaccine is working well in lung transplant patients. A number of lung transplant patients have gotten sick with COVID even after receiving the vaccines.  Based on our recent experience, it can be life-threatening to get COVID  even after being vaccinated. Please continue to act like you did not get the COVID vaccine - social distancing, wearing a mask, good hand hygiene, etc. If the people around you are vaccinated, it will help reduce the risk of you getting COVID. All members of your household should be vaccinated.  7. Get the flu shot in the next couple of weeks.  Get the bivalent COVID booster as well.  8. Let us know in the next couple of weeks if you don't get an appointment with endocrine.  9. You are due for [facility] on or after November 26th, 2022.    Next transplant clinic appointment:  4 months with CXR, labs and PFTs  Next lab draw: one week, then monthly    ~~~~~~~~~~~~~~~~~~~~~~~~~    Thoracic Transplant Office phone [phone], fax [phone]    Office Hours 8:30 - 5:00     For after-hours urgent issues, please dial [phone], and ask to speak with the Thoracic Transplant Coordinator On-Call.  --------------------  To expedite your medication refill(s), please contact your pharmacy and have them fax a refill request to: [phone]  .   *Please allow 3 business days for routine medication refills.  *Please allow 5 business days for controlled substance medication refills.    **For Diabetic medications and supplies refill(s), please contact your pharmacy and have them contact your Endocrine team.  --------------------  For scheduling appointments call  [phone].  --------------------  Please Note: If you are active on [employer], all future test results will be sent by [employer] message only, and will no longer be called to patient. You may also receive communication directly from your physician.

## 2022-09-28 LAB
CMV DNA SPEC NAA+PROBE-ACNC: NOT DETECTED IU/ML
EBV DNA COPIES/ML, INSTRUMENT: ABNORMAL COPIES/ML
EBV DNA SPEC NAA+PROBE-LOG#: 4.3 {LOG_COPIES}/ML

## 2022-09-28 NOTE — PROGRESS NOTES
Spoke with Felicia from [facility] regarding completing repeat labs for pt on 10/3, when pt has left knee replacement.  Orders faxed to [phone].  Felicia will call transplant office if not able to complete labs.

## 2022-09-28 NOTE — LETTER
PHYSICIAN ORDERS      DATE & TIME ISSUED: 2022 9:36 AM  PATIENT NAME: Kecia Blue   : 1960     [facility] MR# [if applicable]: [id number]     DIAGNOSIS:  Lung Transplant  Z94.2    Please check hepatic panel on 10/3/22.     Any questions please call: [place] [phone]    Please fax these results to [phone].        Ame Chow PA-C

## 2022-09-29 LAB
ALP BONE SERPL-CCNC: 100 U/L
ALP LIVER SERPL-CCNC: 333 U/L
ALP OTHER SERPL-CCNC: 0 U/L
ALP SERPL-CCNC: 433 U/L
POSACONAZOLE SERPL-MCNC: 1.6 UG/ML (ref 0.7–5)

## 2022-10-12 NOTE — PROGRESS NOTES
Care Management Discharge Note    Discharge Date: 02/25/21     Discharge Disposition:  [facility]    Discharge Services: n/a     Discharge DME:  n/a    Discharge Transportation: [facility] via stretcher.  [phone]  Arranged for 1400 on Thursday, 2/25  Private pay costs discussed: Not applicable    PAS Confirmation Code:  N/a. Going to ARU  Patient/family educated on Medicare website which has current facility and service quality ratings:  Yes.  Prefer [facility] where they can be followed by U bernardo WAGGONER pulmonar    Education Provided on the Discharge Plan:  yes  Persons Notified of Discharge Plans: patient, spouse, 6B RN, Medicine and pulm. Teams. Dialysis unit  Patient/Family in Agreement with the Plan:  yes  Handoff Referral Completed: will email ARU SW and CC    Additional Information:  Patient medically stable for discharge to ARU on 2/25 (tomorrow).  Transport confirmed for 1400.  Insurance auth received.          DANA RoseSW         Azathioprine Pregnancy And Lactation Text: This medication is Pregnancy Category D and isn't considered safe during pregnancy. It is unknown if this medication is excreted in breast milk.

## 2022-10-13 ENCOUNTER — TELEPHONE (OUTPATIENT)
Dept: TRANSPLANT | Facility: CLINIC | Age: 62
End: 2022-10-13

## 2022-10-13 DIAGNOSIS — Z94.2 S/P LUNG TRANSPLANT (H): Primary | ICD-10-CM

## 2022-10-13 DIAGNOSIS — D84.9 IMMUNOSUPPRESSED STATUS (H): ICD-10-CM

## 2022-10-13 NOTE — TELEPHONE ENCOUNTER
Patient Call: General  Route to LPN    Reason for call: patient called to get some additional details.     Call back needed? Yes    Return Call Needed  Same as documented in contacts section  When to return call?: Greater than one day: Route standard priority

## 2022-10-14 NOTE — TELEPHONE ENCOUNTER
Pt reports her local team is wanting her to be on a baby aspirin for a few weeks after her knee replacement next week, wondering if this is okay with us. Per Ame and pharmacist, this is okay. Pt notified.

## 2022-11-07 ENCOUNTER — TELEPHONE (OUTPATIENT)
Dept: TRANSPLANT | Facility: CLINIC | Age: 62
End: 2022-11-07

## 2022-11-07 DIAGNOSIS — Z94.2 LUNG REPLACED BY TRANSPLANT (H): Primary | ICD-10-CM

## 2022-11-07 DIAGNOSIS — Z94.2 LUNG REPLACED BY TRANSPLANT (H): ICD-10-CM

## 2022-11-07 RX ORDER — TACROLIMUS 0.5 MG/1
CAPSULE ORAL
Qty: 90 CAPSULE | Refills: 1 | Status: SHIPPED | OUTPATIENT
Start: 2022-11-07 | End: 2023-01-17

## 2022-11-07 RX ORDER — TACROLIMUS 0.5 MG/1
CAPSULE ORAL
Qty: 90 CAPSULE | Refills: 11 | Status: CANCELLED | OUTPATIENT
Start: 2022-11-07

## 2022-11-07 NOTE — TELEPHONE ENCOUNTER
General  Route to LPN    Reason for call: Kecia had questions about blood work    Call back needed? Yes  Return Call Needed  Same as documented in contacts section  When to return call?: Greater than one day: Route standard priority     =====================================================    Medication Refill  Route to LPN  Instruct the patient to first contact their pharmacy. If they have called their pharmacy and require further assistance, route to LPN.    Pharmacy Name: [id number] - [place], [place] - [address] AT [address] [address] [address] [facility] [facility] [address]    Name of Medication: tacrolimus (GENERIC EQUIVALENT) 0.5 MG capsule   Dose: Take 0.5mg in am (1 cap) and 1mg in pm (2 caps), Disp-90 capsule, R-11, E-Prescribe    When will the patient be out of this medication?: Less than 24 hours ([facility] LPN, then page if no answer)

## 2022-11-07 NOTE — TELEPHONE ENCOUNTER
Having issues getting refills of Tacrolimus at [facility] as it is too soon to get a refill. They have another company who does specialty medications but unable to get ahold of the company. Kecia confirmed that [employer] works for now until we can figure out what's going on with [facility]. Patient runs out of medication tomorrow. I sent escript over to [employer] and they said they will be able to fill this today. Merly padron was unsure what the out of pocket price will be at this time.

## 2022-11-15 NOTE — PROGRESS NOTES
Seen on 3/31/22 by Dr. Sinclair.     This patient was not seen on this day.   This encounter was opened in error. Please disregard.

## 2022-11-17 ENCOUNTER — TELEPHONE (OUTPATIENT)
Dept: TRANSPLANT | Facility: CLINIC | Age: 62
End: 2022-11-17

## 2022-11-17 DIAGNOSIS — Z94.2 S/P LUNG TRANSPLANT (H): ICD-10-CM

## 2022-11-17 RX ORDER — SULFAMETHOXAZOLE AND TRIMETHOPRIM 400; 80 MG/1; MG/1
1 TABLET ORAL
Qty: 13 TABLET | Refills: 11 | Status: CANCELLED | OUTPATIENT
Start: 2022-11-18

## 2022-11-17 NOTE — TELEPHONE ENCOUNTER
Patient Call: Medication Refill  Route to LPN  Instruct the patient to first contact their pharmacy. If they have called their pharmacy and require further assistance, route to LPN.    [facility] Name: [facility]  Pharmacy Location: [place]  Name of Medication: Bactrim Dose: I tab every other day  Pharmacy states she does not have any refills left   When will the patient be out of this medication?: Less than 3 days (Route high priority)

## 2022-11-18 ENCOUNTER — TELEPHONE (OUTPATIENT)
Dept: TRANSPLANT | Facility: CLINIC | Age: 62
End: 2022-11-18

## 2022-11-18 ENCOUNTER — LAB (OUTPATIENT)
Dept: LAB | Facility: CLINIC | Age: 62
End: 2022-11-18
Payer: MEDICARE

## 2022-11-18 DIAGNOSIS — Z94.2 S/P LUNG TRANSPLANT (H): ICD-10-CM

## 2022-11-18 PROCEDURE — 80197 ASSAY OF TACROLIMUS: CPT

## 2022-11-18 NOTE — TELEPHONE ENCOUNTER
DATE:  11/18/2022     TIME OF RECEIPT FROM LAB:  10:41    [facility]    LAB TEST/ VALUE:  Alk Phos 208, Cr 5.4      RESULTS GIVEN WITH READ-BACK TO (PROVIDER):  Mkiayla Latham    TIME LAB VALUE REPORTED TO PROVIDER:   10:48

## 2022-11-19 NOTE — NURSING NOTE
Chief Complaint   Patient presents with     RECHECK     Lung Tx 1 week follow up    Yohana Valencia CMA    
Transplant Coordinator Note    Reason for visit: Post lung transplant follow up visit   Coordinator: Present   Caregiver:  Ranjan    Health concerns addressed today:  1. Non productive cough, afebrile  2. Two blister-like areas on chest incision.       Activity: Pulmonary rehab    Oxygen needs: room air. PFT's improved    Pain management:  Slight sternal discomfort    Diabetic management: NA    Pt Education: Reviewed signs and symptoms of infection and when to call.    Health Maintenance:  Up to date    Labs, CXR, PFTs reviewed with patient  Medication record reviewed and reconciled  Questions and concerns addressed      Patient Instructions  1.  CT chest today  2. Call if areas on chest incision change in size, color or temperature.  3.  Call for questions or concerns    Next transplant clinic appointment: two weeks  with CXR, labs and PFTs  Next lab draw:  One week      AVS printed at time of check out    You are scheduled for a bronchoscopy on 5/9 at 1200 at the [facility] on 1st floor. Arrive 1 hour early.     Instructions     1. Nothing to eat or drink 8 hours before procedure.  2. Hold your morning medications. Bring them with you to take after the procedure.  3. Have a  available to take you home.   4. If you are a diabetic NA  5. Stop ASA 10 day before procedure.not taking  6. If you are taking coumadin you will need contact your coumadin clinic for instructions. Following with anticoagulation clinic  7. If taking medications for atrial fib NA  ~~~~~~~~~~~~~~~~~~~~~~~~~      
[[id number]]

## 2022-11-21 LAB
TACROLIMUS BLD-MCNC: 8 UG/L (ref 5–15)
TME LAST DOSE: NORMAL H
TME LAST DOSE: NORMAL H

## 2022-11-22 NOTE — RESULT ENCOUNTER NOTE
Armand Mcdonnell,   Your tacrolimus level was 8 at 12 hours on 11/18/22 which is within your goal range of 8-10. No dose change at this time. Please call the transplant office ([phone]) with any questions.   Thanks,   Mikayla

## 2022-11-28 ENCOUNTER — TELEPHONE (OUTPATIENT)
Dept: TRANSPLANT | Facility: CLINIC | Age: 62
End: 2022-11-28

## 2022-11-28 NOTE — TELEPHONE ENCOUNTER
Patient reports she woke up this morning around 0300 with a new cough. White/clear phlegm. Does not feel congested. Tested positive for COVID.  tested positive on Friday, have been doing their best to isolate. Reviewed with Ame. Plan to give MoA. Pt will contact her PCP to arrange and call writer back if she wants to get infusion through [place]. Hold AZA for 14 days. Monitor oxygen sats 3-4x daily and let writer know if it's running less than 93-94% or overall if her symptoms worsen. Monitor temp daily and let writer know if >100.5.     She was due to get Evusheld next Monday. Will need to hold for 3 months if she gets MoA.     She reports PCP is working on MoA, she may get infusion later today.

## 2022-11-28 NOTE — TELEPHONE ENCOUNTER
Patient called stated she has tested positive for covid and would like to know how to get the monoclonal antibodies.

## 2022-12-01 ENCOUNTER — TELEPHONE (OUTPATIENT)
Dept: TRANSPLANT | Facility: CLINIC | Age: 62
End: 2022-12-01

## 2022-12-01 DIAGNOSIS — Z94.2 S/P LUNG TRANSPLANT (H): ICD-10-CM

## 2022-12-01 RX ORDER — POSACONAZOLE 100 MG/1
300 TABLET, DELAYED RELEASE ORAL DAILY
Qty: 90 TABLET | Refills: 11 | Status: SHIPPED | OUTPATIENT
Start: 2022-12-01 | End: 2024-01-01

## 2022-12-01 NOTE — TELEPHONE ENCOUNTER
Patient Call: Medication Refill  Route to LPN  Instruct the patient to first contact their pharmacy. If they have called their pharmacy and require further assistance, route to LPN.    Pharmacy Name: [id number]     Pharmacy Location: [place] AT [facility]    Name of Medication: posaconazole (NOXAFIL)    Dose: 100 MG EC tablet      When will the patient be out of this medication?: Less than 24 hours ([facility] then page if no answer)

## 2022-12-21 ENCOUNTER — LAB (OUTPATIENT)
Dept: LAB | Facility: CLINIC | Age: 62
End: 2022-12-21
Payer: MEDICARE

## 2022-12-21 ENCOUNTER — TELEPHONE (OUTPATIENT)
Dept: TRANSPLANT | Facility: CLINIC | Age: 62
End: 2022-12-21

## 2022-12-21 DIAGNOSIS — Z94.2 S/P LUNG TRANSPLANT (H): ICD-10-CM

## 2022-12-21 PROCEDURE — 80197 ASSAY OF TACROLIMUS: CPT

## 2022-12-21 NOTE — TELEPHONE ENCOUNTER
DATE:  12/21/2022     TIME OF RECEIPT FROM LAB:0920    LAB TEST: alk phos    LAB VALUE: 394    RESULTS GIVEN WITH READ-BACK TO:Gaye Story    TIME LAB VALUE REPORTED TO PROVIDER:0922  Lab phone # [phone]

## 2022-12-23 LAB
TACROLIMUS BLD-MCNC: 8.2 UG/L (ref 5–15)
TME LAST DOSE: NORMAL H
TME LAST DOSE: NORMAL H

## 2022-12-24 NOTE — RESULT ENCOUNTER NOTE
Armand Mcdonnell,   Your tacrolimus level was 8.2 at 12 hours on 12/21/22 which is within your goal range of 8-10. No dose change at this time. Please call the transplant office ([phone]) with any questions.   Thanks,   Mikayla

## 2023-01-01 ENCOUNTER — OFFICE VISIT (OUTPATIENT)
Dept: PULMONOLOGY | Facility: CLINIC | Age: 63
End: 2023-01-01
Payer: MEDICARE

## 2023-01-01 ENCOUNTER — APPOINTMENT (OUTPATIENT)
Dept: ULTRASOUND IMAGING | Facility: CLINIC | Age: 63
DRG: 853 | End: 2023-01-01
Payer: MEDICARE

## 2023-01-01 ENCOUNTER — OFFICE VISIT (OUTPATIENT)
Dept: CARDIOLOGY | Facility: CLINIC | Age: 63
End: 2023-01-01
Attending: SURGERY
Payer: MEDICARE

## 2023-01-01 ENCOUNTER — PATIENT OUTREACH (OUTPATIENT)
Dept: ONCOLOGY | Facility: CLINIC | Age: 63
End: 2023-01-01
Payer: MEDICARE

## 2023-01-01 ENCOUNTER — LAB (OUTPATIENT)
Dept: LAB | Facility: CLINIC | Age: 63
End: 2023-01-01
Attending: PHYSICIAN ASSISTANT
Payer: MEDICARE

## 2023-01-01 ENCOUNTER — LAB (OUTPATIENT)
Dept: LAB | Facility: CLINIC | Age: 63
End: 2023-01-01
Payer: MEDICARE

## 2023-01-01 ENCOUNTER — ANCILLARY PROCEDURE (OUTPATIENT)
Dept: GENERAL RADIOLOGY | Facility: CLINIC | Age: 63
End: 2023-01-01
Attending: PHYSICIAN ASSISTANT
Payer: MEDICARE

## 2023-01-01 ENCOUNTER — ANESTHESIA (OUTPATIENT)
Dept: SURGERY | Facility: CLINIC | Age: 63
End: 2023-01-01
Payer: MEDICARE

## 2023-01-01 ENCOUNTER — ANCILLARY PROCEDURE (OUTPATIENT)
Dept: CT IMAGING | Facility: CLINIC | Age: 63
End: 2023-01-01
Attending: PHYSICIAN ASSISTANT
Payer: MEDICARE

## 2023-01-01 ENCOUNTER — TELEPHONE (OUTPATIENT)
Dept: TRANSPLANT | Facility: CLINIC | Age: 63
End: 2023-01-01

## 2023-01-01 ENCOUNTER — TRANSFERRED RECORDS (OUTPATIENT)
Dept: HEALTH INFORMATION MANAGEMENT | Facility: CLINIC | Age: 63
End: 2023-01-01
Payer: MEDICARE

## 2023-01-01 ENCOUNTER — TELEPHONE (OUTPATIENT)
Dept: TRANSPLANT | Facility: CLINIC | Age: 63
End: 2023-01-01
Payer: MEDICARE

## 2023-01-01 ENCOUNTER — PREP FOR PROCEDURE (OUTPATIENT)
Dept: PULMONOLOGY | Facility: CLINIC | Age: 63
End: 2023-01-01
Payer: MEDICARE

## 2023-01-01 ENCOUNTER — APPOINTMENT (OUTPATIENT)
Dept: GENERAL RADIOLOGY | Facility: CLINIC | Age: 63
DRG: 853 | End: 2023-01-01
Attending: INTERNAL MEDICINE
Payer: MEDICARE

## 2023-01-01 ENCOUNTER — APPOINTMENT (OUTPATIENT)
Dept: CT IMAGING | Facility: CLINIC | Age: 63
DRG: 853 | End: 2023-01-01
Payer: MEDICARE

## 2023-01-01 ENCOUNTER — ANESTHESIA EVENT (OUTPATIENT)
Dept: SURGERY | Facility: CLINIC | Age: 63
End: 2023-01-01
Payer: MEDICARE

## 2023-01-01 ENCOUNTER — APPOINTMENT (OUTPATIENT)
Dept: CARDIOLOGY | Facility: CLINIC | Age: 63
DRG: 853 | End: 2023-01-01
Payer: MEDICARE

## 2023-01-01 ENCOUNTER — OFFICE VISIT (OUTPATIENT)
Dept: PULMONOLOGY | Facility: CLINIC | Age: 63
End: 2023-01-01
Attending: PHYSICIAN ASSISTANT
Payer: MEDICARE

## 2023-01-01 ENCOUNTER — DOCUMENTATION ONLY (OUTPATIENT)
Dept: TRANSPLANT | Facility: CLINIC | Age: 63
End: 2023-01-01
Payer: MEDICARE

## 2023-01-01 ENCOUNTER — HOSPITAL ENCOUNTER (OUTPATIENT)
Dept: NUCLEAR MEDICINE | Facility: CLINIC | Age: 63
Setting detail: NUCLEAR MEDICINE
Discharge: HOME | End: 2023-08-22
Attending: INTERNAL MEDICINE
Payer: MEDICARE

## 2023-01-01 ENCOUNTER — INFUSION THERAPY VISIT (OUTPATIENT)
Dept: ONCOLOGY | Facility: CLINIC | Age: 63
End: 2023-01-01
Attending: INTERNAL MEDICINE
Payer: MEDICARE

## 2023-01-01 ENCOUNTER — APPOINTMENT (OUTPATIENT)
Dept: MRI IMAGING | Facility: CLINIC | Age: 63
DRG: 853 | End: 2023-01-01
Payer: MEDICARE

## 2023-01-01 ENCOUNTER — ONCOLOGY VISIT (OUTPATIENT)
Dept: TRANSPLANT | Facility: CLINIC | Age: 63
End: 2023-01-01
Attending: INTERNAL MEDICINE
Payer: MEDICARE

## 2023-01-01 ENCOUNTER — APPOINTMENT (OUTPATIENT)
Dept: ULTRASOUND IMAGING | Facility: CLINIC | Age: 63
DRG: 853 | End: 2023-01-01
Attending: EMERGENCY MEDICINE
Payer: MEDICARE

## 2023-01-01 ENCOUNTER — ALLIED HEALTH/NURSE VISIT (OUTPATIENT)
Dept: TRANSPLANT | Facility: CLINIC | Age: 63
End: 2023-01-01
Attending: SURGERY
Payer: MEDICARE

## 2023-01-01 ENCOUNTER — VIRTUAL VISIT (OUTPATIENT)
Dept: INFECTIOUS DISEASES | Facility: CLINIC | Age: 63
End: 2023-01-01
Attending: STUDENT IN AN ORGANIZED HEALTH CARE EDUCATION/TRAINING PROGRAM

## 2023-01-01 ENCOUNTER — HOSPITAL ENCOUNTER (OUTPATIENT)
Facility: CLINIC | Age: 63
Discharge: HOME | End: 2023-10-17
Attending: INTERNAL MEDICINE | Admitting: INTERNAL MEDICINE
Payer: MEDICARE

## 2023-01-01 ENCOUNTER — HOSPITAL ENCOUNTER (INPATIENT)
Facility: CLINIC | Age: 63
LOS: 10 days | Discharge: HOME | DRG: 853 | End: 2023-11-04
Attending: EMERGENCY MEDICINE | Admitting: INTERNAL MEDICINE
Payer: MEDICARE

## 2023-01-01 ENCOUNTER — PATIENT OUTREACH (OUTPATIENT)
Dept: ONCOLOGY | Facility: CLINIC | Age: 63
End: 2023-01-01

## 2023-01-01 ENCOUNTER — TELEPHONE (OUTPATIENT)
Dept: DERMATOLOGY | Facility: CLINIC | Age: 63
End: 2023-01-01
Payer: MEDICARE

## 2023-01-01 ENCOUNTER — APPOINTMENT (OUTPATIENT)
Dept: INTERVENTIONAL RADIOLOGY/VASCULAR | Facility: CLINIC | Age: 63
DRG: 853 | End: 2023-01-01
Attending: NURSE PRACTITIONER
Payer: MEDICARE

## 2023-01-01 ENCOUNTER — TELEPHONE (OUTPATIENT)
Dept: ENDOCRINOLOGY | Facility: CLINIC | Age: 63
End: 2023-01-01
Payer: MEDICARE

## 2023-01-01 ENCOUNTER — HOSPITAL ENCOUNTER (OUTPATIENT)
Dept: CARDIOLOGY | Facility: CLINIC | Age: 63
Discharge: HOME | End: 2023-08-22
Attending: INTERNAL MEDICINE
Payer: MEDICARE

## 2023-01-01 ENCOUNTER — TELEPHONE (OUTPATIENT)
Dept: GASTROENTEROLOGY | Facility: CLINIC | Age: 63
End: 2023-01-01
Payer: MEDICARE

## 2023-01-01 ENCOUNTER — TRANSFERRED RECORDS (OUTPATIENT)
Dept: HEALTH INFORMATION MANAGEMENT | Facility: CLINIC | Age: 63
End: 2023-01-01

## 2023-01-01 ENCOUNTER — APPOINTMENT (OUTPATIENT)
Dept: GENERAL RADIOLOGY | Facility: CLINIC | Age: 63
DRG: 853 | End: 2023-01-01
Payer: MEDICARE

## 2023-01-01 ENCOUNTER — TELEPHONE (OUTPATIENT)
Dept: MULTI SPECIALTY CLINIC | Facility: CLINIC | Age: 63
End: 2023-01-01
Payer: MEDICARE

## 2023-01-01 ENCOUNTER — MYC MEDICAL ADVICE (OUTPATIENT)
Dept: CARDIOLOGY | Facility: CLINIC | Age: 63
End: 2023-01-01
Payer: MEDICARE

## 2023-01-01 ENCOUNTER — HOME INFUSION (PRE-WILLOW HOME INFUSION) (OUTPATIENT)
Dept: PHARMACY | Facility: CLINIC | Age: 63
End: 2023-01-01

## 2023-01-01 ENCOUNTER — PRE VISIT (OUTPATIENT)
Dept: TRANSPLANT | Facility: CLINIC | Age: 63
End: 2023-01-01
Payer: MEDICARE

## 2023-01-01 ENCOUNTER — VIRTUAL VISIT (OUTPATIENT)
Dept: PALLIATIVE CARE | Facility: CLINIC | Age: 63
End: 2023-01-01
Attending: FAMILY MEDICINE
Payer: MEDICARE

## 2023-01-01 ENCOUNTER — VIRTUAL VISIT (OUTPATIENT)
Dept: TRANSPLANT | Facility: CLINIC | Age: 63
End: 2023-01-01
Attending: PHYSICIAN ASSISTANT
Payer: MEDICARE

## 2023-01-01 ENCOUNTER — ANCILLARY PROCEDURE (OUTPATIENT)
Dept: CT IMAGING | Facility: CLINIC | Age: 63
End: 2023-01-01
Attending: PHYSICIAN ASSISTANT

## 2023-01-01 ENCOUNTER — DOCUMENTATION ONLY (OUTPATIENT)
Dept: PULMONOLOGY | Facility: CLINIC | Age: 63
End: 2023-01-01

## 2023-01-01 ENCOUNTER — HOSPITAL ENCOUNTER (INPATIENT)
Dept: GENERAL RADIOLOGY | Facility: CLINIC | Age: 63
Discharge: HOME | End: 2023-10-16
Attending: PHYSICIAN ASSISTANT
Payer: MEDICARE

## 2023-01-01 ENCOUNTER — OFFICE VISIT (OUTPATIENT)
Dept: PULMONOLOGY | Facility: CLINIC | Age: 63
DRG: 853 | End: 2023-01-01
Attending: PHYSICIAN ASSISTANT
Payer: MEDICARE

## 2023-01-01 ENCOUNTER — APPOINTMENT (OUTPATIENT)
Dept: LAB | Facility: CLINIC | Age: 63
End: 2023-01-01
Attending: INTERNAL MEDICINE
Payer: MEDICARE

## 2023-01-01 ENCOUNTER — APPOINTMENT (OUTPATIENT)
Dept: GENERAL RADIOLOGY | Facility: CLINIC | Age: 63
DRG: 853 | End: 2023-01-01
Attending: NURSE PRACTITIONER
Payer: MEDICARE

## 2023-01-01 ENCOUNTER — PROCEDURE ONLY VISIT (OUTPATIENT)
Dept: PULMONOLOGY | Facility: CLINIC | Age: 63
End: 2023-01-01
Payer: MEDICARE

## 2023-01-01 ENCOUNTER — HOSPITAL ENCOUNTER (OUTPATIENT)
Facility: CLINIC | Age: 63
Discharge: HOME | End: 2023-08-17
Payer: MEDICARE

## 2023-01-01 ENCOUNTER — APPOINTMENT (OUTPATIENT)
Dept: GENERAL RADIOLOGY | Facility: CLINIC | Age: 63
DRG: 853 | End: 2023-01-01
Attending: PHYSICIAN ASSISTANT
Payer: MEDICARE

## 2023-01-01 ENCOUNTER — APPOINTMENT (OUTPATIENT)
Dept: GENERAL RADIOLOGY | Facility: CLINIC | Age: 63
DRG: 853 | End: 2023-01-01
Attending: EMERGENCY MEDICINE
Payer: MEDICARE

## 2023-01-01 ENCOUNTER — HOSPITAL ENCOUNTER (OUTPATIENT)
Facility: CLINIC | Age: 63
Discharge: HOME | End: 2023-11-17
Attending: INTERNAL MEDICINE | Admitting: INTERNAL MEDICINE
Payer: MEDICARE

## 2023-01-01 VITALS — BODY MASS INDEX: 21.76 KG/M2 | WEIGHT: 122.8 LBS | HEIGHT: 63 IN

## 2023-01-01 VITALS
HEART RATE: 90 BPM | WEIGHT: 134.7 LBS | OXYGEN SATURATION: 95 % | RESPIRATION RATE: 17 BRPM | BODY MASS INDEX: 23 KG/M2 | SYSTOLIC BLOOD PRESSURE: 142 MMHG | DIASTOLIC BLOOD PRESSURE: 88 MMHG | HEIGHT: 64 IN

## 2023-01-01 VITALS
BODY MASS INDEX: 23.57 KG/M2 | SYSTOLIC BLOOD PRESSURE: 158 MMHG | DIASTOLIC BLOOD PRESSURE: 96 MMHG | RESPIRATION RATE: 17 BRPM | OXYGEN SATURATION: 93 % | HEART RATE: 98 BPM | WEIGHT: 133 LBS | HEIGHT: 63 IN

## 2023-01-01 VITALS
BODY MASS INDEX: 22.58 KG/M2 | SYSTOLIC BLOOD PRESSURE: 146 MMHG | DIASTOLIC BLOOD PRESSURE: 81 MMHG | RESPIRATION RATE: 18 BRPM | HEART RATE: 97 BPM | TEMPERATURE: 97.4 F | OXYGEN SATURATION: 99 % | WEIGHT: 127.43 LBS | HEIGHT: 63 IN

## 2023-01-01 VITALS
WEIGHT: 131.2 LBS | RESPIRATION RATE: 16 BRPM | DIASTOLIC BLOOD PRESSURE: 69 MMHG | TEMPERATURE: 98.5 F | SYSTOLIC BLOOD PRESSURE: 110 MMHG | HEART RATE: 99 BPM | OXYGEN SATURATION: 95 % | BODY MASS INDEX: 23.24 KG/M2

## 2023-01-01 VITALS — DIASTOLIC BLOOD PRESSURE: 76 MMHG | OXYGEN SATURATION: 100 % | HEART RATE: 96 BPM | SYSTOLIC BLOOD PRESSURE: 123 MMHG

## 2023-01-01 VITALS
WEIGHT: 133.9 LBS | HEART RATE: 78 BPM | HEIGHT: 63 IN | OXYGEN SATURATION: 94 % | DIASTOLIC BLOOD PRESSURE: 89 MMHG | SYSTOLIC BLOOD PRESSURE: 144 MMHG | BODY MASS INDEX: 23.73 KG/M2

## 2023-01-01 VITALS — HEIGHT: 63 IN | BODY MASS INDEX: 22.68 KG/M2 | WEIGHT: 128 LBS

## 2023-01-01 VITALS
BODY MASS INDEX: 22.93 KG/M2 | SYSTOLIC BLOOD PRESSURE: 164 MMHG | RESPIRATION RATE: 17 BRPM | HEIGHT: 63 IN | HEART RATE: 100 BPM | DIASTOLIC BLOOD PRESSURE: 89 MMHG | OXYGEN SATURATION: 99 % | WEIGHT: 129.4 LBS

## 2023-01-01 VITALS
OXYGEN SATURATION: 95 % | HEART RATE: 80 BPM | DIASTOLIC BLOOD PRESSURE: 63 MMHG | SYSTOLIC BLOOD PRESSURE: 96 MMHG | RESPIRATION RATE: 16 BRPM

## 2023-01-01 VITALS
BODY MASS INDEX: 23.44 KG/M2 | HEIGHT: 63 IN | HEART RATE: 76 BPM | TEMPERATURE: 97.8 F | RESPIRATION RATE: 22 BRPM | SYSTOLIC BLOOD PRESSURE: 150 MMHG | OXYGEN SATURATION: 96 % | WEIGHT: 132.28 LBS | DIASTOLIC BLOOD PRESSURE: 86 MMHG

## 2023-01-01 VITALS
OXYGEN SATURATION: 95 % | DIASTOLIC BLOOD PRESSURE: 74 MMHG | WEIGHT: 128.7 LBS | HEART RATE: 102 BPM | SYSTOLIC BLOOD PRESSURE: 140 MMHG | TEMPERATURE: 97.8 F | BODY MASS INDEX: 22.8 KG/M2 | RESPIRATION RATE: 16 BRPM

## 2023-01-01 VITALS — BODY MASS INDEX: 22.65 KG/M2 | WEIGHT: 132.7 LBS | HEIGHT: 64 IN

## 2023-01-01 VITALS — OXYGEN SATURATION: 98 % | HEART RATE: 105 BPM | DIASTOLIC BLOOD PRESSURE: 86 MMHG | SYSTOLIC BLOOD PRESSURE: 135 MMHG

## 2023-01-01 DIAGNOSIS — I25.10 CARDIOVASCULAR DISEASE: ICD-10-CM

## 2023-01-01 DIAGNOSIS — Z94.2 LUNG REPLACED BY TRANSPLANT (H): Primary | ICD-10-CM

## 2023-01-01 DIAGNOSIS — D72.819 LEUKOPENIA, UNSPECIFIED TYPE: ICD-10-CM

## 2023-01-01 DIAGNOSIS — Z94.2 LUNG REPLACED BY TRANSPLANT (H): ICD-10-CM

## 2023-01-01 DIAGNOSIS — Z94.2 S/P LUNG TRANSPLANT (H): ICD-10-CM

## 2023-01-01 DIAGNOSIS — I25.110 CORONARY ARTERY DISEASE INVOLVING NATIVE CORONARY ARTERY OF NATIVE HEART WITH UNSTABLE ANGINA PECTORIS (H): ICD-10-CM

## 2023-01-01 DIAGNOSIS — Z79.899 ENCOUNTER FOR LONG-TERM (CURRENT) USE OF HIGH-RISK MEDICATION: ICD-10-CM

## 2023-01-01 DIAGNOSIS — Z79.52 LONG TERM (CURRENT) USE OF SYSTEMIC STEROIDS: ICD-10-CM

## 2023-01-01 DIAGNOSIS — J98.4 DISEASE OF LUNG: ICD-10-CM

## 2023-01-01 DIAGNOSIS — E83.42 HYPOMAGNESEMIA: ICD-10-CM

## 2023-01-01 DIAGNOSIS — D70.9 NEUTROPENIA (H): ICD-10-CM

## 2023-01-01 DIAGNOSIS — B25.9 CYTOMEGALOVIRUS (CMV) VIREMIA (H): ICD-10-CM

## 2023-01-01 DIAGNOSIS — D47.Z1 LYMPHOPROLIFERATIVE DISORDER, POST-TRANSPLANT (PTLD) (H): Primary | ICD-10-CM

## 2023-01-01 DIAGNOSIS — Z01.818 PRE-TRANSPLANT EVALUATION FOR KIDNEY TRANSPLANT: ICD-10-CM

## 2023-01-01 DIAGNOSIS — J18.9 POSTOBSTRUCTIVE PNEUMONIA: ICD-10-CM

## 2023-01-01 DIAGNOSIS — K76.6 PORTAL HYPERTENSION (H): ICD-10-CM

## 2023-01-01 DIAGNOSIS — J4A.9 CHRONIC LUNG ALLOGRAFT DYSFUNCTION (CLAD) (H): ICD-10-CM

## 2023-01-01 DIAGNOSIS — N61.0 CELLULITIS OF RIGHT BREAST: ICD-10-CM

## 2023-01-01 DIAGNOSIS — A49.8 INFECTION DUE TO STENOTROPHOMONAS MALTOPHILIA: Primary | ICD-10-CM

## 2023-01-01 DIAGNOSIS — Z94.2 LUNG TRANSPLANT STATUS, BILATERAL (H): ICD-10-CM

## 2023-01-01 DIAGNOSIS — I10 ESSENTIAL HYPERTENSION: ICD-10-CM

## 2023-01-01 DIAGNOSIS — R91.1 LUNG NODULE: Primary | ICD-10-CM

## 2023-01-01 DIAGNOSIS — J98.09 BRONCHIAL STENOSIS: Primary | ICD-10-CM

## 2023-01-01 DIAGNOSIS — D70.9 NEUTROPENIA (H): Primary | ICD-10-CM

## 2023-01-01 DIAGNOSIS — Z99.2 END STAGE RENAL FAILURE ON DIALYSIS (H): ICD-10-CM

## 2023-01-01 DIAGNOSIS — N18.6 END STAGE RENAL DISEASE (H): ICD-10-CM

## 2023-01-01 DIAGNOSIS — N18.5 CHRONIC KIDNEY DISEASE, STAGE V (H): ICD-10-CM

## 2023-01-01 DIAGNOSIS — K74.00 LIVER FIBROSIS: ICD-10-CM

## 2023-01-01 DIAGNOSIS — R06.00 DYSPNEA, UNSPECIFIED TYPE: ICD-10-CM

## 2023-01-01 DIAGNOSIS — B25.9 CYTOMEGALOVIRUS (CMV) VIREMIA (H): Primary | ICD-10-CM

## 2023-01-01 DIAGNOSIS — Z76.82 ORGAN TRANSPLANT CANDIDATE: ICD-10-CM

## 2023-01-01 DIAGNOSIS — Z94.2 S/P LUNG TRANSPLANT (H): Primary | ICD-10-CM

## 2023-01-01 DIAGNOSIS — Z94.2 STATUS POST LUNG TRANSPLANTATION (H): ICD-10-CM

## 2023-01-01 DIAGNOSIS — J98.09 STENOSIS OF RIGHT BRONCHUS: ICD-10-CM

## 2023-01-01 DIAGNOSIS — A42.9 ACTINOMYCES INFECTION: Primary | ICD-10-CM

## 2023-01-01 DIAGNOSIS — N18.6 END STAGE RENAL FAILURE ON DIALYSIS (H): ICD-10-CM

## 2023-01-01 DIAGNOSIS — T45.1X5A CALCINEURIN INHIBITOR TOXICITY, THERAPEUTIC USE: ICD-10-CM

## 2023-01-01 DIAGNOSIS — B27.00 EBV (EPSTEIN-BARR VIRUS) VIREMIA: ICD-10-CM

## 2023-01-01 DIAGNOSIS — D72.819 LEUKOPENIA, UNSPECIFIED TYPE: Primary | ICD-10-CM

## 2023-01-01 DIAGNOSIS — T50.995S ADVERSE EFFECT OF OTHER DRUGS, MEDICAMENTS AND BIOLOGICAL SUBSTANCES, SEQUELA: Primary | ICD-10-CM

## 2023-01-01 DIAGNOSIS — Z23 NEED FOR VACCINATION: ICD-10-CM

## 2023-01-01 DIAGNOSIS — K76.89 LIVER DYSFUNCTION: ICD-10-CM

## 2023-01-01 DIAGNOSIS — Z51.5 PALLIATIVE CARE PATIENT: Primary | ICD-10-CM

## 2023-01-01 DIAGNOSIS — D47.Z1 LYMPHOPROLIFERATIVE DISORDER, POST-TRANSPLANT (PTLD) (H): ICD-10-CM

## 2023-01-01 DIAGNOSIS — R60.0 EDEMA OF LEFT UPPER EXTREMITY: ICD-10-CM

## 2023-01-01 DIAGNOSIS — J86.9 EMPYEMA OF LUNG (H): ICD-10-CM

## 2023-01-01 LAB
% LINING CELLS, BODY FLUID: 3 %
1,3 BETA GLUCAN SER-MCNC: 35 PG/ML
1,3 BETA GLUCAN SER-MCNC: 57 PG/ML
6 MIN WALK (FT): 500 FT
6 MIN WALK (M): 152 M
ACID FAST STAIN (ARUP): NORMAL
ALBUMIN SERPL BCG-MCNC: 2.7 G/DL (ref 3.5–5.2)
ALBUMIN SERPL BCG-MCNC: 2.9 G/DL (ref 3.5–5.2)
ALBUMIN SERPL BCG-MCNC: 2.9 G/DL (ref 3.5–5.2)
ALBUMIN SERPL BCG-MCNC: 3 G/DL (ref 3.5–5.2)
ALBUMIN SERPL BCG-MCNC: 3.1 G/DL (ref 3.5–5.2)
ALBUMIN SERPL BCG-MCNC: 3.2 G/DL (ref 3.5–5.2)
ALBUMIN SERPL BCG-MCNC: 3.6 G/DL (ref 3.5–5.2)
ALBUMIN SERPL BCG-MCNC: 3.8 G/DL (ref 3.5–5.2)
ALDOLASE SERPL-CCNC: 6.5 U/L
ALP SERPL-CCNC: 107 U/L (ref 35–104)
ALP SERPL-CCNC: 114 U/L (ref 35–104)
ALP SERPL-CCNC: 114 U/L (ref 35–104)
ALP SERPL-CCNC: 124 U/L (ref 35–104)
ALP SERPL-CCNC: 131 U/L (ref 35–104)
ALP SERPL-CCNC: 140 U/L (ref 35–104)
ALP SERPL-CCNC: 141 U/L (ref 35–104)
ALP SERPL-CCNC: 153 U/L (ref 35–104)
ALP SERPL-CCNC: 168 U/L (ref 35–104)
ALP SERPL-CCNC: 179 U/L (ref 35–104)
ALP SERPL-CCNC: 186 U/L (ref 35–104)
ALP SERPL-CCNC: 208 U/L (ref 40–150)
ALP SERPL-CCNC: 232 U/L (ref 35–104)
ALP SERPL-CCNC: 306 U/L (ref 35–104)
ALT SERPL W P-5'-P-CCNC: 15 U/L (ref 0–50)
ALT SERPL W P-5'-P-CCNC: 16 U/L (ref 0–50)
ALT SERPL W P-5'-P-CCNC: 19 U/L (ref 0–50)
ALT SERPL W P-5'-P-CCNC: 19 U/L (ref 0–50)
ALT SERPL W P-5'-P-CCNC: 20 U/L (ref 0–50)
ALT SERPL W P-5'-P-CCNC: 22 U/L (ref 0–50)
ALT SERPL W P-5'-P-CCNC: 23 U/L (ref 0–50)
ALT SERPL W P-5'-P-CCNC: 24 U/L (ref 0–50)
ALT SERPL W P-5'-P-CCNC: 24 U/L (ref 0–50)
ALT SERPL W P-5'-P-CCNC: 25 U/L (ref 0–50)
ALT SERPL W P-5'-P-CCNC: 31 U/L (ref 0–50)
ANION GAP SERPL CALCULATED.3IONS-SCNC: 10 MMOL/L (ref 7–15)
ANION GAP SERPL CALCULATED.3IONS-SCNC: 11 MMOL/L (ref 7–15)
ANION GAP SERPL CALCULATED.3IONS-SCNC: 12 MMOL/L (ref 7–15)
ANION GAP SERPL CALCULATED.3IONS-SCNC: 13 MMOL/L (ref 7–15)
ANION GAP SERPL CALCULATED.3IONS-SCNC: 14 MMOL/L (ref 7–15)
ANION GAP SERPL CALCULATED.3IONS-SCNC: 15 MMOL/L (ref 7–15)
ANION GAP SERPL CALCULATED.3IONS-SCNC: 8 MMOL/L (ref 7–15)
ANION GAP SERPL CALCULATED.3IONS-SCNC: 9 MMOL/L (ref 7–15)
APPEARANCE FLD: ABNORMAL
APPEARANCE FLD: ABNORMAL
APPEARANCE FLD: CLEAR
AST SERPL W P-5'-P-CCNC: 12 U/L (ref 0–45)
AST SERPL W P-5'-P-CCNC: 14 U/L (ref 0–45)
AST SERPL W P-5'-P-CCNC: 14 U/L (ref 0–45)
AST SERPL W P-5'-P-CCNC: 17 U/L (ref 0–45)
AST SERPL W P-5'-P-CCNC: 18 U/L (ref 0–45)
AST SERPL W P-5'-P-CCNC: 22 U/L (ref 0–45)
AST SERPL W P-5'-P-CCNC: 24 U/L (ref 0–45)
AST SERPL W P-5'-P-CCNC: 26 U/L (ref 0–45)
AST SERPL W P-5'-P-CCNC: 27 U/L (ref 0–45)
AST SERPL W P-5'-P-CCNC: 34 U/L (ref 0–45)
AST SERPL W P-5'-P-CCNC: 39 U/L (ref 0–45)
ATRIAL RATE - MUSE: 108 BPM
ATRIAL RATE - MUSE: 115 BPM
ATRIAL RATE - MUSE: 123 BPM
ATRIAL RATE - MUSE: 91 BPM
ATRIAL RATE - MUSE: 93 BPM
ATRIAL RATE - MUSE: 93 BPM
BACTERIA BLD CULT: NO GROWTH
BACTERIA BLD CULT: NO GROWTH
BACTERIA BRONCH: ABNORMAL
BACTERIA BRONCH: NO GROWTH
BACTERIA SPEC CULT: ABNORMAL
BACTERIA SPEC CULT: ABNORMAL
BACTERIA SPEC CULT: NO GROWTH
BACTERIA SPEC CULT: NO GROWTH
BACTERIA SPT CULT: NORMAL
BASE EXCESS BLDV CALC-SCNC: 9.5 MMOL/L (ref -8.1–1.9)
BASO+EOS+MONOS # BLD AUTO: ABNORMAL 10*3/UL
BASO+EOS+MONOS NFR BLD AUTO: ABNORMAL %
BASOPHILS # BLD AUTO: 0 10E3/UL (ref 0–0.2)
BASOPHILS # BLD AUTO: 0.1 10E3/UL (ref 0–0.2)
BASOPHILS # BLD AUTO: 0.1 10E3/UL (ref 0–0.2)
BASOPHILS # BLD AUTO: ABNORMAL 10*3/UL
BASOPHILS # BLD MANUAL: 0 10E3/UL (ref 0–0.2)
BASOPHILS # BLD MANUAL: 0 10E3/UL (ref 0–0.2)
BASOPHILS # BLD MANUAL: 0.1 10E3/UL (ref 0–0.2)
BASOPHILS # BLD MANUAL: 0.3 10E3/UL (ref 0–0.2)
BASOPHILS NFR BLD AUTO: 0 %
BASOPHILS NFR BLD AUTO: 1 %
BASOPHILS NFR BLD AUTO: 2 %
BASOPHILS NFR BLD AUTO: ABNORMAL %
BASOPHILS NFR BLD MANUAL: 0 %
BASOPHILS NFR BLD MANUAL: 0 %
BASOPHILS NFR BLD MANUAL: 1 %
BASOPHILS NFR BLD MANUAL: 1 %
BASOPHILS NFR BLD MANUAL: 2 %
BASOPHILS NFR BLD MANUAL: 3 %
BASOPHILS NFR FLD MANUAL: 1 %
BILIRUB DIRECT SERPL-MCNC: 0.5 MG/DL (ref 0–0.3)
BILIRUB SERPL-MCNC: 0.5 MG/DL
BILIRUB SERPL-MCNC: 0.5 MG/DL
BILIRUB SERPL-MCNC: 0.6 MG/DL
BILIRUB SERPL-MCNC: 0.6 MG/DL
BILIRUB SERPL-MCNC: 0.7 MG/DL
BILIRUB SERPL-MCNC: 0.7 MG/DL
BILIRUB SERPL-MCNC: 0.8 MG/DL
BILIRUB SERPL-MCNC: 0.8 MG/DL
BILIRUB SERPL-MCNC: 0.9 MG/DL
BILIRUB SERPL-MCNC: 0.9 MG/DL
BILIRUB SERPL-MCNC: 1 MG/DL
BILIRUB SERPL-MCNC: 1.2 MG/DL
BILIRUB SERPL-MCNC: 1.5 MG/DL
BILIRUB SERPL-MCNC: 1.5 MG/DL
BRONCHOSCOPY: NORMAL
BUN SERPL-MCNC: 11 MG/DL (ref 8–23)
BUN SERPL-MCNC: 12.3 MG/DL (ref 8–23)
BUN SERPL-MCNC: 14.1 MG/DL (ref 8–23)
BUN SERPL-MCNC: 14.6 MG/DL (ref 8–23)
BUN SERPL-MCNC: 15.7 MG/DL (ref 8–23)
BUN SERPL-MCNC: 16 MG/DL (ref 8–23)
BUN SERPL-MCNC: 18.2 MG/DL (ref 8–23)
BUN SERPL-MCNC: 19 MG/DL (ref 8–23)
BUN SERPL-MCNC: 20.5 MG/DL (ref 8–23)
BUN SERPL-MCNC: 21.2 MG/DL (ref 8–23)
BUN SERPL-MCNC: 22.1 MG/DL (ref 8–23)
BUN SERPL-MCNC: 22.2 MG/DL (ref 8–23)
BUN SERPL-MCNC: 24.6 MG/DL (ref 8–23)
BUN SERPL-MCNC: 24.6 MG/DL (ref 8–23)
BUN SERPL-MCNC: 26.4 MG/DL (ref 8–23)
BUN SERPL-MCNC: 27.7 MG/DL (ref 8–23)
BUN SERPL-MCNC: 29.5 MG/DL (ref 8–23)
BUN SERPL-MCNC: 31.7 MG/DL (ref 8–23)
BUN SERPL-MCNC: 41 MG/DL (ref 8–23)
BUN SERPL-MCNC: 48.1 MG/DL (ref 8–23)
BURR CELLS BLD QL SMEAR: SLIGHT
C DIFF TOX B STL QL: NEGATIVE
C PNEUM DNA SPEC QL NAA+PROBE: NOT DETECTED
C3 SERPL-MCNC: 76 MG/DL (ref 81–157)
C4 SERPL-MCNC: 15 MG/DL (ref 13–39)
CA-I BLD-MCNC: 4.5 MG/DL (ref 4.4–5.2)
CALCIUM SERPL-MCNC: 7.9 MG/DL (ref 8.8–10.2)
CALCIUM SERPL-MCNC: 7.9 MG/DL (ref 8.8–10.2)
CALCIUM SERPL-MCNC: 8 MG/DL (ref 8.8–10.2)
CALCIUM SERPL-MCNC: 8.2 MG/DL (ref 8.8–10.2)
CALCIUM SERPL-MCNC: 8.2 MG/DL (ref 8.8–10.2)
CALCIUM SERPL-MCNC: 8.3 MG/DL (ref 8.8–10.2)
CALCIUM SERPL-MCNC: 8.4 MG/DL (ref 8.8–10.2)
CALCIUM SERPL-MCNC: 8.5 MG/DL (ref 8.8–10.2)
CALCIUM SERPL-MCNC: 8.5 MG/DL (ref 8.8–10.2)
CALCIUM SERPL-MCNC: 8.6 MG/DL (ref 8.8–10.2)
CALCIUM SERPL-MCNC: 8.7 MG/DL (ref 8.8–10.2)
CALCIUM SERPL-MCNC: 8.8 MG/DL (ref 8.8–10.2)
CALCIUM SERPL-MCNC: 8.9 MG/DL (ref 8.8–10.2)
CALCIUM SERPL-MCNC: 9 MG/DL (ref 8.8–10.2)
CALCIUM SERPL-MCNC: 9 MG/DL (ref 8.8–10.2)
CALCIUM SERPL-MCNC: 9.2 MG/DL (ref 8.8–10.2)
CALCIUM SERPL-MCNC: 9.3 MG/DL (ref 8.8–10.2)
CALCIUM SERPL-MCNC: 9.4 MG/DL (ref 8.8–10.2)
CCP AB SER IA-ACNC: 2.7 U/ML
CELL COUNT BODY FLUID SOURCE: ABNORMAL
CELL COUNT BODY FLUID SOURCE: ABNORMAL
CELL COUNT BODY FLUID SOURCE: NORMAL
CHLORIDE SERPL-SCNC: 101 MMOL/L (ref 98–107)
CHLORIDE SERPL-SCNC: 102 MMOL/L (ref 98–107)
CHLORIDE SERPL-SCNC: 103 MMOL/L (ref 98–107)
CHLORIDE SERPL-SCNC: 103 MMOL/L (ref 98–107)
CHLORIDE SERPL-SCNC: 92 MMOL/L (ref 98–107)
CHLORIDE SERPL-SCNC: 94 MMOL/L (ref 98–107)
CHLORIDE SERPL-SCNC: 94 MMOL/L (ref 98–107)
CHLORIDE SERPL-SCNC: 95 MMOL/L (ref 98–107)
CHLORIDE SERPL-SCNC: 96 MMOL/L (ref 98–107)
CHLORIDE SERPL-SCNC: 96 MMOL/L (ref 98–107)
CHLORIDE SERPL-SCNC: 97 MMOL/L (ref 98–107)
CHLORIDE SERPL-SCNC: 98 MMOL/L (ref 98–107)
CHLORIDE SERPL-SCNC: 99 MMOL/L (ref 98–107)
CK SERPL-CCNC: 69 U/L (ref 26–192)
CMV DNA SPEC NAA+PROBE-ACNC: 103 IU/ML
CMV DNA SPEC NAA+PROBE-ACNC: 46 IU/ML
CMV DNA SPEC NAA+PROBE-ACNC: 521 IU/ML
CMV DNA SPEC NAA+PROBE-ACNC: 75 IU/ML
CMV DNA SPEC NAA+PROBE-ACNC: 80 IU/ML
CMV DNA SPEC NAA+PROBE-ACNC: <35 IU/ML
CMV DNA SPEC NAA+PROBE-ACNC: NOT DETECTED IU/ML
CMV DNA SPEC NAA+PROBE-ACNC: NOT DETECTED IU/ML
CMV DNA SPEC NAA+PROBE-LOG#: 1.7 {LOG_COPIES}/ML
CMV DNA SPEC NAA+PROBE-LOG#: 1.9 {LOG_COPIES}/ML
CMV DNA SPEC NAA+PROBE-LOG#: 1.9 {LOG_COPIES}/ML
CMV DNA SPEC NAA+PROBE-LOG#: 2 {LOG_COPIES}/ML
CMV DNA SPEC NAA+PROBE-LOG#: 2.7 {LOG_COPIES}/ML
CMV DNA SPEC NAA+PROBE-LOG#: <1.5 {LOG_COPIES}/ML
CMV DNA SPEC QL NAA+PROBE: NOT DETECTED
COLOR FLD: ABNORMAL
COLOR FLD: COLORLESS
COLOR FLD: COLORLESS
CREAT SERPL-MCNC: 1.94 MG/DL (ref 0.51–0.95)
CREAT SERPL-MCNC: 2.23 MG/DL (ref 0.51–0.95)
CREAT SERPL-MCNC: 2.51 MG/DL (ref 0.51–0.95)
CREAT SERPL-MCNC: 2.6 MG/DL (ref 0.51–0.95)
CREAT SERPL-MCNC: 2.6 MG/DL (ref 0.51–0.95)
CREAT SERPL-MCNC: 2.72 MG/DL (ref 0.51–0.95)
CREAT SERPL-MCNC: 2.86 MG/DL (ref 0.51–0.95)
CREAT SERPL-MCNC: 3 MG/DL (ref 0.51–0.95)
CREAT SERPL-MCNC: 3.43 MG/DL (ref 0.51–0.95)
CREAT SERPL-MCNC: 3.44 MG/DL (ref 0.51–0.95)
CREAT SERPL-MCNC: 3.54 MG/DL (ref 0.51–0.95)
CREAT SERPL-MCNC: 3.55 MG/DL (ref 0.51–0.95)
CREAT SERPL-MCNC: 3.68 MG/DL (ref 0.51–0.95)
CREAT SERPL-MCNC: 3.79 MG/DL (ref 0.51–0.95)
CREAT SERPL-MCNC: 4.02 MG/DL (ref 0.51–0.95)
CREAT SERPL-MCNC: 4.48 MG/DL (ref 0.51–0.95)
CREAT SERPL-MCNC: 4.9 MG/DL (ref 0.51–0.95)
CREAT SERPL-MCNC: 5.32 MG/DL (ref 0.51–0.95)
CREAT SERPL-MCNC: 5.61 MG/DL (ref 0.51–0.95)
CREAT SERPL-MCNC: 6.35 MG/DL (ref 0.51–0.95)
CRP SERPL-MCNC: 139 MG/L
CRYPTOC AG SPEC QL: NEGATIVE
CV STRESS MAX HR HE: 90
DEPRECATED HCO3 PLAS-SCNC: 21 MMOL/L (ref 22–29)
DEPRECATED HCO3 PLAS-SCNC: 23 MMOL/L (ref 22–29)
DEPRECATED HCO3 PLAS-SCNC: 24 MMOL/L (ref 22–29)
DEPRECATED HCO3 PLAS-SCNC: 24 MMOL/L (ref 22–29)
DEPRECATED HCO3 PLAS-SCNC: 25 MMOL/L (ref 22–29)
DEPRECATED HCO3 PLAS-SCNC: 26 MMOL/L (ref 22–29)
DEPRECATED HCO3 PLAS-SCNC: 29 MMOL/L (ref 22–29)
DEPRECATED HCO3 PLAS-SCNC: 30 MMOL/L (ref 22–29)
DEPRECATED HCO3 PLAS-SCNC: 31 MMOL/L (ref 22–29)
DEPRECATED HCO3 PLAS-SCNC: 31 MMOL/L (ref 22–29)
DEPRECATED HCO3 PLAS-SCNC: 32 MMOL/L (ref 22–29)
DEPRECATED HCO3 PLAS-SCNC: 34 MMOL/L (ref 22–29)
DIASTOLIC BLOOD PRESSURE - MUSE: NORMAL MMHG
DONOR IDENTIFICATION: NORMAL
DONOR IDENTIFICATION: NORMAL
DSA COMMENTS: NORMAL
DSA COMMENTS: NORMAL
DSA PRESENT: NO
DSA PRESENT: NO
DSA TEST METHOD: NORMAL
DSA TEST METHOD: NORMAL
DSDNA AB SER-ACNC: 1 IU/ML
EBV DNA COPIES/ML, INSTRUMENT: ABNORMAL COPIES/ML
EBV DNA SPEC NAA+PROBE-LOG#: 4 {LOG_COPIES}/ML
EBV DNA SPEC NAA+PROBE-LOG#: 4.5 {LOG_COPIES}/ML
EBV DNA SPEC NAA+PROBE-LOG#: 4.6 {LOG_COPIES}/ML
EBV DNA SPEC NAA+PROBE-LOG#: 5.7 {LOG_COPIES}/ML
EBV DNA SPEC NAA+PROBE-LOG#: 6 {LOG_COPIES}/ML
EGFRCR SERPLBLD CKD-EPI 2021: 10 ML/MIN/1.73M2
EGFRCR SERPLBLD CKD-EPI 2021: 11 ML/MIN/1.73M2
EGFRCR SERPLBLD CKD-EPI 2021: 12 ML/MIN/1.73M2
EGFRCR SERPLBLD CKD-EPI 2021: 13 ML/MIN/1.73M2
EGFRCR SERPLBLD CKD-EPI 2021: 13 ML/MIN/1.73M2
EGFRCR SERPLBLD CKD-EPI 2021: 14 ML/MIN/1.73M2
EGFRCR SERPLBLD CKD-EPI 2021: 14 ML/MIN/1.73M2
EGFRCR SERPLBLD CKD-EPI 2021: 15 ML/MIN/1.73M2
EGFRCR SERPLBLD CKD-EPI 2021: 15 ML/MIN/1.73M2
EGFRCR SERPLBLD CKD-EPI 2021: 17 ML/MIN/1.73M2
EGFRCR SERPLBLD CKD-EPI 2021: 18 ML/MIN/1.73M2
EGFRCR SERPLBLD CKD-EPI 2021: 19 ML/MIN/1.73M2
EGFRCR SERPLBLD CKD-EPI 2021: 20 ML/MIN/1.73M2
EGFRCR SERPLBLD CKD-EPI 2021: 20 ML/MIN/1.73M2
EGFRCR SERPLBLD CKD-EPI 2021: 21 ML/MIN/1.73M2
EGFRCR SERPLBLD CKD-EPI 2021: 25 ML/MIN/1.73M2
EGFRCR SERPLBLD CKD-EPI 2021: 29 ML/MIN/1.73M2
EGFRCR SERPLBLD CKD-EPI 2021: 8 ML/MIN/1.73M2
EGFRCR SERPLBLD CKD-EPI 2021: 9 ML/MIN/1.73M2
EJ AB SER QL: NEGATIVE
ENA JO1 AB SER IA-ACNC: 33 UNITS
ENA JO1 AB SER IA-ACNC: 38 U/ML
ENA JO1 IGG SER-ACNC: POSITIVE
ENA PM/SCL AB SER-ACNC: <20 UNITS
ENA SCL70 IGG SER IA-ACNC: <0.6 U/ML
ENA SCL70 IGG SER IA-ACNC: NEGATIVE
ENA SM IGG SER IA-ACNC: 1.9 U/ML
ENA SM IGG SER IA-ACNC: NEGATIVE
ENA SS-A 52KD IGG SER IA-ACNC: 31 UNITS
ENA SS-A AB SER IA-ACNC: 6.8 U/ML
ENA SS-A AB SER IA-ACNC: NEGATIVE
ENA SS-B IGG SER IA-ACNC: 3.1 U/ML
ENA SS-B IGG SER IA-ACNC: NEGATIVE
EOSINOPHIL # BLD AUTO: 0 10E3/UL (ref 0–0.7)
EOSINOPHIL # BLD AUTO: 0 10E3/UL (ref 0–0.7)
EOSINOPHIL # BLD AUTO: 0.1 10E3/UL (ref 0–0.7)
EOSINOPHIL # BLD AUTO: 0.2 10E3/UL (ref 0–0.7)
EOSINOPHIL # BLD AUTO: ABNORMAL 10*3/UL
EOSINOPHIL # BLD MANUAL: 0 10E3/UL (ref 0–0.7)
EOSINOPHIL # BLD MANUAL: 0 10E3/UL (ref 0–0.7)
EOSINOPHIL # BLD MANUAL: 0.1 10E3/UL (ref 0–0.7)
EOSINOPHIL # BLD MANUAL: 0.1 10E3/UL (ref 0–0.7)
EOSINOPHIL # BLD MANUAL: 0.2 10E3/UL (ref 0–0.7)
EOSINOPHIL # BLD MANUAL: 0.2 10E3/UL (ref 0–0.7)
EOSINOPHIL NFR BLD AUTO: 0 %
EOSINOPHIL NFR BLD AUTO: 1 %
EOSINOPHIL NFR BLD AUTO: 2 %
EOSINOPHIL NFR BLD AUTO: 5 %
EOSINOPHIL NFR BLD AUTO: ABNORMAL %
EOSINOPHIL NFR BLD MANUAL: 0 %
EOSINOPHIL NFR BLD MANUAL: 0 %
EOSINOPHIL NFR BLD MANUAL: 1 %
EOSINOPHIL NFR BLD MANUAL: 2 %
EOSINOPHIL NFR BLD MANUAL: 4 %
EOSINOPHIL NFR BLD MANUAL: 7 %
EOSINOPHIL NFR FLD MANUAL: 5 %
EOSINOPHIL NFR FLD MANUAL: 6 %
EOSINOPHIL NFR FLD MANUAL: 7 %
ERYTHROCYTE [DISTWIDTH] IN BLOOD BY AUTOMATED COUNT: 15.7 % (ref 10–15)
ERYTHROCYTE [DISTWIDTH] IN BLOOD BY AUTOMATED COUNT: 15.7 % (ref 10–15)
ERYTHROCYTE [DISTWIDTH] IN BLOOD BY AUTOMATED COUNT: 15.9 % (ref 10–15)
ERYTHROCYTE [DISTWIDTH] IN BLOOD BY AUTOMATED COUNT: 16 % (ref 10–15)
ERYTHROCYTE [DISTWIDTH] IN BLOOD BY AUTOMATED COUNT: 16.7 % (ref 10–15)
ERYTHROCYTE [DISTWIDTH] IN BLOOD BY AUTOMATED COUNT: 17.1 % (ref 10–15)
ERYTHROCYTE [DISTWIDTH] IN BLOOD BY AUTOMATED COUNT: 17.2 % (ref 10–15)
ERYTHROCYTE [DISTWIDTH] IN BLOOD BY AUTOMATED COUNT: 17.3 % (ref 10–15)
ERYTHROCYTE [DISTWIDTH] IN BLOOD BY AUTOMATED COUNT: 17.4 % (ref 10–15)
ERYTHROCYTE [DISTWIDTH] IN BLOOD BY AUTOMATED COUNT: 17.4 % (ref 10–15)
ERYTHROCYTE [DISTWIDTH] IN BLOOD BY AUTOMATED COUNT: 17.5 % (ref 10–15)
ERYTHROCYTE [DISTWIDTH] IN BLOOD BY AUTOMATED COUNT: 17.9 % (ref 10–15)
ERYTHROCYTE [DISTWIDTH] IN BLOOD BY AUTOMATED COUNT: 18.3 % (ref 10–15)
ERYTHROCYTE [DISTWIDTH] IN BLOOD BY AUTOMATED COUNT: 23.3 % (ref 10–15)
ERYTHROCYTE [SEDIMENTATION RATE] IN BLOOD BY WESTERGREN METHOD: 58 MM/HR (ref 0–30)
ERYTHROCYTE [SEDIMENTATION RATE] IN BLOOD BY WESTERGREN METHOD: 66 MM/HR (ref 0–30)
EXPTIME-PRE: 2.42 SEC
EXPTIME-PRE: 4.06 SEC
EXPTIME-PRE: 5 SEC
EXPTIME-PRE: 5.45 SEC
EXPTIME-PRE: 6.62 SEC
FEF2575-%PRED-PRE: 46 %
FEF2575-%PRED-PRE: 50 %
FEF2575-%PRED-PRE: 51 %
FEF2575-PRE: 0.98 L/SEC
FEF2575-PRE: 1.06 L/SEC
FEF2575-PRE: 1.08 L/SEC
FEF2575-PRE: 1.1 L/SEC
FEF2575-PRE: 1.1 L/SEC
FEF2575-PRED: 2.11 L/SEC
FEF2575-PRED: 2.12 L/SEC
FEF2575-PRED: 2.12 L/SEC
FEF2575-PRED: 2.16 L/SEC
FEF2575-PRED: 2.16 L/SEC
FEFMAX-%PRED-PRE: 33 %
FEFMAX-%PRED-PRE: 39 %
FEFMAX-%PRED-PRE: 40 %
FEFMAX-%PRED-PRE: 49 %
FEFMAX-%PRED-PRE: 53 %
FEFMAX-PRE: 2.11 L/SEC
FEFMAX-PRE: 2.49 L/SEC
FEFMAX-PRE: 2.56 L/SEC
FEFMAX-PRE: 3.09 L/SEC
FEFMAX-PRE: 3.38 L/SEC
FEFMAX-PRED: 6.24 L/SEC
FEFMAX-PRED: 6.24 L/SEC
FEFMAX-PRED: 6.25 L/SEC
FEFMAX-PRED: 6.29 L/SEC
FEFMAX-PRED: 6.29 L/SEC
FEV1-%PRED-PRE: 33 %
FEV1-%PRED-PRE: 37 %
FEV1-%PRED-PRE: 38 %
FEV1-%PRED-PRE: 46 %
FEV1-%PRED-PRE: 48 %
FEV1-PRE: 0.78 L
FEV1-PRE: 0.87 L
FEV1-PRE: 0.89 L
FEV1-PRE: 1.07 L
FEV1-PRE: 1.12 L
FEV1FEV6-PRE: 83 %
FEV1FEV6-PRE: 86 %
FEV1FEV6-PRE: 87 %
FEV1FEV6-PRE: 91 %
FEV1FEV6-PRE: 91 %
FEV1FEV6-PRED: 81 %
FEV1FVC-PRE: 83 %
FEV1FVC-PRE: 85 %
FEV1FVC-PRE: 86 %
FEV1FVC-PRE: 91 %
FEV1FVC-PRE: 91 %
FEV1FVC-PRED: 80 %
FIBRILLARIN AB SER QL: NEGATIVE
FIFMAX-PRE: 2.2 L/SEC
FIFMAX-PRE: 2.34 L/SEC
FIFMAX-PRE: 2.63 L/SEC
FIFMAX-PRE: 2.66 L/SEC
FIFMAX-PRE: 2.83 L/SEC
FLUAV H1 2009 PAND RNA SPEC QL NAA+PROBE: NOT DETECTED
FLUAV H1 RNA SPEC QL NAA+PROBE: NOT DETECTED
FLUAV H3 RNA SPEC QL NAA+PROBE: NOT DETECTED
FLUAV RNA SPEC QL NAA+PROBE: NOT DETECTED
FLUBV RNA SPEC QL NAA+PROBE: NOT DETECTED
FRAGMENTS BLD QL SMEAR: SLIGHT
FVC-%PRED-PRE: 29 %
FVC-%PRED-PRE: 33 %
FVC-%PRED-PRE: 36 %
FVC-%PRED-PRE: 42 %
FVC-%PRED-PRE: 46 %
FVC-PRE: 0.85 L
FVC-PRE: 0.96 L
FVC-PRE: 1.04 L
FVC-PRE: 1.25 L
FVC-PRE: 1.35 L
FVC-PRED: 2.89 L
FVC-PRED: 2.91 L
FVC-PRED: 2.91 L
GALACTOMANNAN AG BAL QL: NEGATIVE
GALACTOMANNAN AG BAL QL: NEGATIVE
GALACTOMANNAN AG SERPL QL IA: NEGATIVE
GALACTOMANNAN AG SERPL QL IA: NEGATIVE
GALACTOMANNAN AG SPEC IA-ACNC: 0.05
GALACTOMANNAN AG SPEC IA-ACNC: 0.05
GALACTOMANNAN AG SPEC IA-ACNC: 0.07
GALACTOMANNAN AG SPEC IA-ACNC: 0.07
GFR SERPL CREATININE-BSD FRML MDRD: 7 ML/MIN/1.73M2
GLUCOSE BLD-MCNC: 86 MG/DL (ref 70–99)
GLUCOSE SERPL-MCNC: 100 MG/DL (ref 70–99)
GLUCOSE SERPL-MCNC: 108 MG/DL (ref 70–99)
GLUCOSE SERPL-MCNC: 114 MG/DL (ref 70–99)
GLUCOSE SERPL-MCNC: 114 MG/DL (ref 70–99)
GLUCOSE SERPL-MCNC: 116 MG/DL (ref 70–99)
GLUCOSE SERPL-MCNC: 119 MG/DL (ref 70–99)
GLUCOSE SERPL-MCNC: 120 MG/DL (ref 70–99)
GLUCOSE SERPL-MCNC: 122 MG/DL (ref 70–99)
GLUCOSE SERPL-MCNC: 130 MG/DL (ref 70–99)
GLUCOSE SERPL-MCNC: 134 MG/DL (ref 70–99)
GLUCOSE SERPL-MCNC: 136 MG/DL (ref 70–99)
GLUCOSE SERPL-MCNC: 140 MG/DL (ref 70–99)
GLUCOSE SERPL-MCNC: 144 MG/DL (ref 70–99)
GLUCOSE SERPL-MCNC: 156 MG/DL (ref 70–99)
GLUCOSE SERPL-MCNC: 75 MG/DL (ref 70–99)
GLUCOSE SERPL-MCNC: 81 MG/DL (ref 70–99)
GLUCOSE SERPL-MCNC: 85 MG/DL (ref 70–99)
GLUCOSE SERPL-MCNC: 90 MG/DL (ref 70–99)
GLUCOSE SERPL-MCNC: 92 MG/DL (ref 70–99)
GLUCOSE SERPL-MCNC: 95 MG/DL (ref 70–99)
GRAM STAIN RESULT: NORMAL
HADV DNA SPEC QL NAA+PROBE: NOT DETECTED
HBV CORE AB SERPL QL IA: NONREACTIVE
HBV SURFACE AB SERPL IA-ACNC: 0.67 M[IU]/ML
HBV SURFACE AB SERPL IA-ACNC: 0.92 M[IU]/ML
HBV SURFACE AB SERPL IA-ACNC: NONREACTIVE M[IU]/ML
HBV SURFACE AB SERPL IA-ACNC: NONREACTIVE M[IU]/ML
HBV SURFACE AG SERPL QL IA: NONREACTIVE
HBV SURFACE AG SERPL QL IA: NONREACTIVE
HCO3 BLDV-SCNC: 34 MMOL/L (ref 21–28)
HCOV PNL SPEC NAA+PROBE: NOT DETECTED
HCT VFR BLD AUTO: 23.8 % (ref 35–47)
HCT VFR BLD AUTO: 24.6 % (ref 35–47)
HCT VFR BLD AUTO: 24.8 % (ref 35–47)
HCT VFR BLD AUTO: 24.8 % (ref 35–47)
HCT VFR BLD AUTO: 25.3 % (ref 35–47)
HCT VFR BLD AUTO: 25.5 % (ref 35–47)
HCT VFR BLD AUTO: 26 % (ref 35–47)
HCT VFR BLD AUTO: 26 % (ref 35–47)
HCT VFR BLD AUTO: 26.8 % (ref 35–47)
HCT VFR BLD AUTO: 27.1 % (ref 35–47)
HCT VFR BLD AUTO: 27.1 % (ref 35–47)
HCT VFR BLD AUTO: 29.4 % (ref 35–47)
HCT VFR BLD AUTO: 29.9 % (ref 35–47)
HCT VFR BLD AUTO: 30.8 % (ref 35–47)
HCT VFR BLD AUTO: 31.3 % (ref 35–47)
HCT VFR BLD AUTO: 32.6 % (ref 35–47)
HCT VFR BLD AUTO: 33.3 % (ref 35–47)
HCT VFR BLD AUTO: 33.4 % (ref 35–47)
HCT VFR BLD AUTO: 33.5 % (ref 35–47)
HCT VFR BLD AUTO: 34.5 % (ref 35–47)
HGB BLD-MCNC: 10.1 G/DL (ref 11.7–15.7)
HGB BLD-MCNC: 10.4 G/DL (ref 11.7–15.7)
HGB BLD-MCNC: 10.4 G/DL (ref 11.7–15.7)
HGB BLD-MCNC: 10.5 G/DL (ref 11.7–15.7)
HGB BLD-MCNC: 10.7 G/DL (ref 11.7–15.7)
HGB BLD-MCNC: 10.9 G/DL (ref 11.7–15.7)
HGB BLD-MCNC: 11.2 G/DL (ref 11.7–15.7)
HGB BLD-MCNC: 7.6 G/DL (ref 11.7–15.7)
HGB BLD-MCNC: 7.8 G/DL (ref 11.7–15.7)
HGB BLD-MCNC: 7.9 G/DL (ref 11.7–15.7)
HGB BLD-MCNC: 7.9 G/DL (ref 11.7–15.7)
HGB BLD-MCNC: 8 G/DL (ref 11.7–15.7)
HGB BLD-MCNC: 8.2 G/DL (ref 11.7–15.7)
HGB BLD-MCNC: 8.5 G/DL (ref 11.7–15.7)
HGB BLD-MCNC: 8.6 G/DL (ref 11.7–15.7)
HGB BLD-MCNC: 8.7 G/DL (ref 11.7–15.7)
HGB BLD-MCNC: 9.3 G/DL (ref 11.7–15.7)
HGB BLD-MCNC: 9.5 G/DL (ref 11.7–15.7)
HGB BLD-MCNC: 9.6 G/DL (ref 11.7–15.7)
HMPV RNA SPEC QL NAA+PROBE: NOT DETECTED
HOLD SPECIMEN: NORMAL
HPIV1 RNA SPEC QL NAA+PROBE: NOT DETECTED
HPIV2 RNA SPEC QL NAA+PROBE: NOT DETECTED
HPIV3 RNA SPEC QL NAA+PROBE: NOT DETECTED
HPIV4 RNA SPEC QL NAA+PROBE: NOT DETECTED
IGE SERPL-ACNC: <2 KU/L (ref 0–114)
IGG SERPL-MCNC: 979 MG/DL (ref 610–1616)
IMM GRANULOCYTES # BLD: 0 10E3/UL
IMM GRANULOCYTES # BLD: 0.1 10E3/UL
IMM GRANULOCYTES # BLD: 0.1 10E3/UL
IMM GRANULOCYTES # BLD: 0.2 10E3/UL
IMM GRANULOCYTES # BLD: ABNORMAL 10*3/UL
IMM GRANULOCYTES NFR BLD: 0 %
IMM GRANULOCYTES NFR BLD: 1 %
IMM GRANULOCYTES NFR BLD: 2 %
IMM GRANULOCYTES NFR BLD: 2 %
IMM GRANULOCYTES NFR BLD: ABNORMAL %
IMMUKNOW IMMUNE CELL FUNCTION: 169 NG/ML
IMMUKNOW IMMUNE CELL FUNCTION: 412 NG/ML
IMMUKNOW IMMUNE CELL FUNCTION: 479 NG/ML
INR PPP: 0.99 (ref 0.85–1.15)
INTERPRETATION ECG - MUSE: NORMAL
KOH PREPARATION: NORMAL
KOH PREPARATION: NORMAL
KU AB SER QL: NEGATIVE
LACTATE BLD-SCNC: 0.6 MMOL/L
LACTATE SERPL-SCNC: 0.7 MMOL/L (ref 0.7–2)
LACTATE SERPL-SCNC: 0.9 MMOL/L (ref 0.7–2)
LACTATE SERPL-SCNC: 1.1 MMOL/L (ref 0.7–2)
LACTATE SERPL-SCNC: 1.1 MMOL/L (ref 0.7–2)
LACTATE SERPL-SCNC: 1.2 MMOL/L (ref 0.7–2)
LIPASE SERPL-CCNC: 24 U/L (ref 13–60)
LVEF ECHO: NORMAL
LVEF ECHO: NORMAL
LYMPHOCYTES # BLD AUTO: 0.1 10E3/UL (ref 0.8–5.3)
LYMPHOCYTES # BLD AUTO: 0.1 10E3/UL (ref 0.8–5.3)
LYMPHOCYTES # BLD AUTO: 0.2 10E3/UL (ref 0.8–5.3)
LYMPHOCYTES # BLD AUTO: 0.3 10E3/UL (ref 0.8–5.3)
LYMPHOCYTES # BLD AUTO: 0.4 10E3/UL (ref 0.8–5.3)
LYMPHOCYTES # BLD AUTO: 0.5 10E3/UL (ref 0.8–5.3)
LYMPHOCYTES # BLD AUTO: ABNORMAL 10*3/UL
LYMPHOCYTES # BLD MANUAL: 0.1 10E3/UL (ref 0.8–5.3)
LYMPHOCYTES # BLD MANUAL: 0.1 10E3/UL (ref 0.8–5.3)
LYMPHOCYTES # BLD MANUAL: 0.4 10E3/UL (ref 0.8–5.3)
LYMPHOCYTES # BLD MANUAL: 0.6 10E3/UL (ref 0.8–5.3)
LYMPHOCYTES NFR BLD AUTO: 1 %
LYMPHOCYTES NFR BLD AUTO: 1 %
LYMPHOCYTES NFR BLD AUTO: 10 %
LYMPHOCYTES NFR BLD AUTO: 14 %
LYMPHOCYTES NFR BLD AUTO: 14 %
LYMPHOCYTES NFR BLD AUTO: 18 %
LYMPHOCYTES NFR BLD AUTO: 2 %
LYMPHOCYTES NFR BLD AUTO: 29 %
LYMPHOCYTES NFR BLD AUTO: 9 %
LYMPHOCYTES NFR BLD AUTO: ABNORMAL %
LYMPHOCYTES NFR BLD MANUAL: 1 %
LYMPHOCYTES NFR BLD MANUAL: 2 %
LYMPHOCYTES NFR BLD MANUAL: 22 %
LYMPHOCYTES NFR BLD MANUAL: 4 %
LYMPHOCYTES NFR BLD MANUAL: 5 %
LYMPHOCYTES NFR BLD MANUAL: 7 %
LYMPHOCYTES NFR FLD MANUAL: 4 %
LYMPHOCYTES NFR FLD MANUAL: 4 %
LYMPHOCYTES NFR FLD MANUAL: 6 %
Lab: NORMAL
M PNEUMO DNA SPEC QL NAA+PROBE: NOT DETECTED
MAGNESIUM SERPL-MCNC: 1.5 MG/DL (ref 1.7–2.3)
MAGNESIUM SERPL-MCNC: 1.5 MG/DL (ref 1.7–2.3)
MAGNESIUM SERPL-MCNC: 1.8 MG/DL (ref 1.7–2.3)
MAGNESIUM SERPL-MCNC: 1.9 MG/DL (ref 1.7–2.3)
MAGNESIUM SERPL-MCNC: 1.9 MG/DL (ref 1.7–2.3)
MAGNESIUM SERPL-MCNC: 2 MG/DL (ref 1.7–2.3)
MCH RBC QN AUTO: 32.3 PG (ref 26.5–33)
MCH RBC QN AUTO: 32.6 PG (ref 26.5–33)
MCH RBC QN AUTO: 32.7 PG (ref 26.5–33)
MCH RBC QN AUTO: 32.9 PG (ref 26.5–33)
MCH RBC QN AUTO: 33 PG (ref 26.5–33)
MCH RBC QN AUTO: 33.1 PG (ref 26.5–33)
MCH RBC QN AUTO: 33.1 PG (ref 26.5–33)
MCH RBC QN AUTO: 33.2 PG (ref 26.5–33)
MCH RBC QN AUTO: 33.5 PG (ref 26.5–33)
MCH RBC QN AUTO: 35.6 PG (ref 26.5–33)
MCH RBC QN AUTO: 35.7 PG (ref 26.5–33)
MCH RBC QN AUTO: 36.9 PG (ref 26.5–33)
MCHC RBC AUTO-ENTMCNC: 30.4 G/DL (ref 31.5–36.5)
MCHC RBC AUTO-ENTMCNC: 31 G/DL (ref 31.5–36.5)
MCHC RBC AUTO-ENTMCNC: 31 G/DL (ref 31.5–36.5)
MCHC RBC AUTO-ENTMCNC: 31.5 G/DL (ref 31.5–36.5)
MCHC RBC AUTO-ENTMCNC: 31.6 G/DL (ref 31.5–36.5)
MCHC RBC AUTO-ENTMCNC: 31.6 G/DL (ref 31.5–36.5)
MCHC RBC AUTO-ENTMCNC: 31.7 G/DL (ref 31.5–36.5)
MCHC RBC AUTO-ENTMCNC: 31.7 G/DL (ref 31.5–36.5)
MCHC RBC AUTO-ENTMCNC: 31.9 G/DL (ref 31.5–36.5)
MCHC RBC AUTO-ENTMCNC: 32.1 G/DL (ref 31.5–36.5)
MCHC RBC AUTO-ENTMCNC: 32.2 G/DL (ref 31.5–36.5)
MCHC RBC AUTO-ENTMCNC: 32.6 G/DL (ref 31.5–36.5)
MCHC RBC AUTO-ENTMCNC: 32.8 G/DL (ref 31.5–36.5)
MCV RBC AUTO: 100 FL (ref 78–100)
MCV RBC AUTO: 101 FL (ref 78–100)
MCV RBC AUTO: 102 FL (ref 78–100)
MCV RBC AUTO: 103 FL (ref 78–100)
MCV RBC AUTO: 103 FL (ref 78–100)
MCV RBC AUTO: 104 FL (ref 78–100)
MCV RBC AUTO: 105 FL (ref 78–100)
MCV RBC AUTO: 106 FL (ref 78–100)
MCV RBC AUTO: 109 FL (ref 78–100)
MCV RBC AUTO: 113 FL (ref 78–100)
MCV RBC AUTO: 115 FL (ref 78–100)
MCV RBC AUTO: 115 FL (ref 78–100)
MDA5 AB SER LINE BLOT-ACNC: 45 UNITS
MI2 AB SER QL: NEGATIVE
MISCELLANEOUS TEST 1 (ARUP): NORMAL
MJ AB SER LINE BLOT-ACNC: <20 UNITS
MONOCYTES # BLD AUTO: 0.1 10E3/UL (ref 0–1.3)
MONOCYTES # BLD AUTO: 0.2 10E3/UL (ref 0–1.3)
MONOCYTES # BLD AUTO: 0.3 10E3/UL (ref 0–1.3)
MONOCYTES # BLD AUTO: 0.4 10E3/UL (ref 0–1.3)
MONOCYTES # BLD AUTO: 0.7 10E3/UL (ref 0–1.3)
MONOCYTES # BLD AUTO: ABNORMAL 10*3/UL
MONOCYTES # BLD MANUAL: 0.1 10E3/UL (ref 0–1.3)
MONOCYTES # BLD MANUAL: 0.1 10E3/UL (ref 0–1.3)
MONOCYTES # BLD MANUAL: 0.2 10E3/UL (ref 0–1.3)
MONOCYTES # BLD MANUAL: 0.3 10E3/UL (ref 0–1.3)
MONOCYTES NFR BLD AUTO: 1 %
MONOCYTES NFR BLD AUTO: 10 %
MONOCYTES NFR BLD AUTO: 14 %
MONOCYTES NFR BLD AUTO: 5 %
MONOCYTES NFR BLD AUTO: ABNORMAL %
MONOCYTES NFR BLD MANUAL: 1 %
MONOCYTES NFR BLD MANUAL: 1 %
MONOCYTES NFR BLD MANUAL: 10 %
MONOCYTES NFR BLD MANUAL: 2 %
MONOCYTES NFR BLD MANUAL: 3 %
MONOCYTES NFR BLD MANUAL: 4 %
MONOS+MACROS NFR FLD MANUAL: 78 %
MONOS+MACROS NFR FLD MANUAL: 84 %
MONOS+MACROS NFR FLD MANUAL: 88 %
MRSA DNA SPEC QL NAA+PROBE: NEGATIVE
NEUTROPHILS # BLD AUTO: 0.8 10E3/UL (ref 1.6–8.3)
NEUTROPHILS # BLD AUTO: 1.4 10E3/UL (ref 1.6–8.3)
NEUTROPHILS # BLD AUTO: 1.7 10E3/UL (ref 1.6–8.3)
NEUTROPHILS # BLD AUTO: 1.9 10E3/UL (ref 1.6–8.3)
NEUTROPHILS # BLD AUTO: 11 10E3/UL (ref 1.6–8.3)
NEUTROPHILS # BLD AUTO: 3.1 10E3/UL (ref 1.6–8.3)
NEUTROPHILS # BLD AUTO: 4.1 10E3/UL (ref 1.6–8.3)
NEUTROPHILS # BLD AUTO: 7.2 10E3/UL (ref 1.6–8.3)
NEUTROPHILS # BLD AUTO: 8.1 10E3/UL (ref 1.6–8.3)
NEUTROPHILS # BLD AUTO: ABNORMAL 10*3/UL
NEUTROPHILS # BLD MANUAL: 1 10E3/UL (ref 1.6–8.3)
NEUTROPHILS # BLD MANUAL: 10.7 10E3/UL (ref 1.6–8.3)
NEUTROPHILS # BLD MANUAL: 5 10E3/UL (ref 1.6–8.3)
NEUTROPHILS # BLD MANUAL: 5 10E3/UL (ref 1.6–8.3)
NEUTROPHILS # BLD MANUAL: 5.7 10E3/UL (ref 1.6–8.3)
NEUTROPHILS # BLD MANUAL: 7.7 10E3/UL (ref 1.6–8.3)
NEUTROPHILS NFR BLD AUTO: 53 %
NEUTROPHILS NFR BLD AUTO: 64 %
NEUTROPHILS NFR BLD AUTO: 67 %
NEUTROPHILS NFR BLD AUTO: 73 %
NEUTROPHILS NFR BLD AUTO: 75 %
NEUTROPHILS NFR BLD AUTO: 78 %
NEUTROPHILS NFR BLD AUTO: 94 %
NEUTROPHILS NFR BLD AUTO: 95 %
NEUTROPHILS NFR BLD AUTO: 96 %
NEUTROPHILS NFR BLD AUTO: ABNORMAL %
NEUTROPHILS NFR BLD MANUAL: 60 %
NEUTROPHILS NFR BLD MANUAL: 87 %
NEUTROPHILS NFR BLD MANUAL: 88 %
NEUTROPHILS NFR BLD MANUAL: 90 %
NEUTROPHILS NFR BLD MANUAL: 94 %
NEUTROPHILS NFR BLD MANUAL: 96 %
NEUTS BAND NFR FLD MANUAL: 2 %
NEUTS BAND NFR FLD MANUAL: 5 %
NEUTS BAND NFR FLD MANUAL: 7 %
NRBC # BLD AUTO: 0 10E3/UL
NRBC # BLD AUTO: 0.1 10E3/UL
NRBC BLD AUTO-RTO: 0 /100
NRBC BLD AUTO-RTO: 1 /100
NRBC BLD AUTO-RTO: 1 /100
NRBC BLD MANUAL-RTO: 1 %
NRBC BLD MANUAL-RTO: 1 %
O2/TOTAL GAS SETTING VFR VENT: 21 %
OBSERVATION IMP: NEGATIVE
OBSERVATION IMP: NEGATIVE
OJ AB SER QL: NEGATIVE
ORGAN: NORMAL
ORGAN: NORMAL
P AXIS - MUSE: 36 DEGREES
P AXIS - MUSE: 39 DEGREES
P AXIS - MUSE: 43 DEGREES
P AXIS - MUSE: 45 DEGREES
P AXIS - MUSE: 53 DEGREES
P AXIS - MUSE: 55 DEGREES
P JIROVECII DNA SPEC QL NAA+PROBE: NOT DETECTED
PATH INTERP SPEC-IMP: NORMAL
PATH REPORT.COMMENTS IMP SPEC: NORMAL
PATH REPORT.FINAL DX SPEC: NORMAL
PATH REPORT.GROSS SPEC: NORMAL
PATH REPORT.MICROSCOPIC SPEC OTHER STN: NORMAL
PATH REPORT.RELEVANT HX SPEC: NORMAL
PATH REV: ABNORMAL
PCO2 BLDV: 47 MM HG (ref 40–50)
PERFORMING LABORATORY: NORMAL
PH BLDV: 7.47 [PH] (ref 7.32–7.43)
PHOSPHATE SERPL-MCNC: 1.1 MG/DL (ref 2.5–4.5)
PHOSPHATE SERPL-MCNC: 1.9 MG/DL (ref 2.5–4.5)
PHOSPHATE SERPL-MCNC: 2.2 MG/DL (ref 2.5–4.5)
PHOSPHATE SERPL-MCNC: 2.3 MG/DL (ref 2.5–4.5)
PHOSPHATE SERPL-MCNC: 2.6 MG/DL (ref 2.5–4.5)
PHOSPHATE SERPL-MCNC: 3 MG/DL (ref 2.5–4.5)
PHOSPHATE SERPL-MCNC: 3.1 MG/DL (ref 2.5–4.5)
PHOSPHATE SERPL-MCNC: 3.5 MG/DL (ref 2.5–4.5)
PHOSPHATE SERPL-MCNC: 3.6 MG/DL (ref 2.5–4.5)
PHOSPHATE SERPL-MCNC: 3.8 MG/DL (ref 2.5–4.5)
PHOSPHATE SERPL-MCNC: 3.9 MG/DL (ref 2.5–4.5)
PHOSPHATE SERPL-MCNC: 4 MG/DL (ref 2.5–4.5)
PL12 AB SER QL: NEGATIVE
PL7 AB SER QL: NEGATIVE
PLAT MORPH BLD: ABNORMAL
PLATELET # BLD AUTO: 103 10E3/UL (ref 150–450)
PLATELET # BLD AUTO: 118 10E3/UL (ref 150–450)
PLATELET # BLD AUTO: 120 10E3/UL (ref 150–450)
PLATELET # BLD AUTO: 130 10E3/UL (ref 150–450)
PLATELET # BLD AUTO: 131 10E3/UL (ref 150–450)
PLATELET # BLD AUTO: 133 10E3/UL (ref 150–450)
PLATELET # BLD AUTO: 138 10E3/UL (ref 150–450)
PLATELET # BLD AUTO: 142 10E3/UL (ref 150–450)
PLATELET # BLD AUTO: 143 10E3/UL (ref 150–450)
PLATELET # BLD AUTO: 143 10E3/UL (ref 150–450)
PLATELET # BLD AUTO: 149 10E3/UL (ref 150–450)
PLATELET # BLD AUTO: 152 10E3/UL (ref 150–450)
PLATELET # BLD AUTO: 153 10E3/UL (ref 150–450)
PLATELET # BLD AUTO: 155 10E3/UL (ref 150–450)
PLATELET # BLD AUTO: 165 10E3/UL (ref 150–450)
PLATELET # BLD AUTO: 187 10E3/UL (ref 150–450)
PLATELET # BLD AUTO: 190 10E3/UL (ref 150–450)
PLATELET # BLD AUTO: 192 10E3/UL (ref 150–450)
PLATELET # BLD AUTO: 204 10E3/UL (ref 150–450)
PLATELET # BLD AUTO: 256 10E3/UL (ref 150–450)
PM/SCL-100 AB SER QL LINE BLOT: NEGATIVE
PO2 BLDV: 40 MM HG (ref 25–47)
POLYCHROMASIA BLD QL SMEAR: SLIGHT
POSACONAZOLE SERPL-MCNC: 0.4 UG/ML (ref 0.7–5)
POSACONAZOLE SERPL-MCNC: 1.4 UG/ML (ref 0.7–5)
POSACONAZOLE SERPL-MCNC: 1.5 UG/ML (ref 0.7–5)
POTASSIUM BLD-SCNC: 3.8 MMOL/L (ref 3.5–5)
POTASSIUM SERPL-SCNC: 3.2 MMOL/L (ref 3.4–5.3)
POTASSIUM SERPL-SCNC: 3.2 MMOL/L (ref 3.4–5.3)
POTASSIUM SERPL-SCNC: 3.4 MMOL/L (ref 3.4–5.3)
POTASSIUM SERPL-SCNC: 3.5 MMOL/L (ref 3.4–5.3)
POTASSIUM SERPL-SCNC: 3.6 MMOL/L (ref 3.4–5.3)
POTASSIUM SERPL-SCNC: 3.7 MMOL/L (ref 3.4–5.3)
POTASSIUM SERPL-SCNC: 3.7 MMOL/L (ref 3.4–5.3)
POTASSIUM SERPL-SCNC: 3.8 MMOL/L (ref 3.4–5.3)
POTASSIUM SERPL-SCNC: 3.9 MMOL/L (ref 3.4–5.3)
POTASSIUM SERPL-SCNC: 4 MMOL/L (ref 3.4–5.3)
POTASSIUM SERPL-SCNC: 4.1 MMOL/L (ref 3.4–5.3)
POTASSIUM SERPL-SCNC: 4.3 MMOL/L (ref 3.4–5.3)
POTASSIUM SERPL-SCNC: 4.4 MMOL/L (ref 3.4–5.3)
POTASSIUM SERPL-SCNC: 4.4 MMOL/L (ref 3.4–5.3)
POTASSIUM SERPL-SCNC: 4.5 MMOL/L (ref 3.4–5.3)
POTASSIUM SERPL-SCNC: 4.5 MMOL/L (ref 3.4–5.3)
POTASSIUM SERPL-SCNC: 4.6 MMOL/L (ref 3.4–5.3)
PR INTERVAL - MUSE: 150 MS
PR INTERVAL - MUSE: 154 MS
PR INTERVAL - MUSE: 156 MS
PR INTERVAL - MUSE: 160 MS
PR INTERVAL - MUSE: 168 MS
PR INTERVAL - MUSE: 184 MS
PROCALCITONIN SERPL IA-MCNC: 0.7 NG/ML
PROMYELOCYTES # BLD MANUAL: 0 10E3/UL
PROMYELOCYTES # BLD MANUAL: 0.1 10E3/UL
PROMYELOCYTES NFR BLD MANUAL: 1 %
PROMYELOCYTES NFR BLD MANUAL: 1 %
PROT SERPL-MCNC: 5.3 G/DL (ref 6.4–8.3)
PROT SERPL-MCNC: 5.5 G/DL (ref 6.4–8.3)
PROT SERPL-MCNC: 5.5 G/DL (ref 6.4–8.3)
PROT SERPL-MCNC: 5.8 G/DL (ref 6.4–8.3)
PROT SERPL-MCNC: 5.9 G/DL (ref 6.4–8.3)
PROT SERPL-MCNC: 6 G/DL (ref 6.4–8.3)
PROT SERPL-MCNC: 6 G/DL (ref 6.4–8.3)
PROT SERPL-MCNC: 6.3 G/DL (ref 6.4–8.3)
PROT SERPL-MCNC: 7.3 G/DL (ref 6.4–8.3)
PROT SERPL-MCNC: 7.4 G/DL (ref 6.4–8.3)
PROTOCOL CUTOFF: NORMAL
PROTOCOL CUTOFF: NORMAL
QRS DURATION - MUSE: 106 MS
QRS DURATION - MUSE: 106 MS
QRS DURATION - MUSE: 110 MS
QRS DURATION - MUSE: 120 MS
QRS DURATION - MUSE: 88 MS
QRS DURATION - MUSE: 98 MS
QT - MUSE: 304 MS
QT - MUSE: 324 MS
QT - MUSE: 358 MS
QT - MUSE: 366 MS
QT - MUSE: 366 MS
QT - MUSE: 374 MS
QTC - MUSE: 420 MS
QTC - MUSE: 434 MS
QTC - MUSE: 450 MS
QTC - MUSE: 455 MS
QTC - MUSE: 465 MS
QTC - MUSE: 512 MS
R AXIS - MUSE: -23 DEGREES
R AXIS - MUSE: -23 DEGREES
R AXIS - MUSE: -25 DEGREES
R AXIS - MUSE: -6 DEGREES
R AXIS - MUSE: -6 DEGREES
R AXIS - MUSE: 0 DEGREES
RATE PRESSURE PRODUCT: NORMAL
RBC # BLD AUTO: 2.3 10E6/UL (ref 3.8–5.2)
RBC # BLD AUTO: 2.31 10E6/UL (ref 3.8–5.2)
RBC # BLD AUTO: 2.39 10E6/UL (ref 3.8–5.2)
RBC # BLD AUTO: 2.39 10E6/UL (ref 3.8–5.2)
RBC # BLD AUTO: 2.41 10E6/UL (ref 3.8–5.2)
RBC # BLD AUTO: 2.42 10E6/UL (ref 3.8–5.2)
RBC # BLD AUTO: 2.49 10E6/UL (ref 3.8–5.2)
RBC # BLD AUTO: 2.51 10E6/UL (ref 3.8–5.2)
RBC # BLD AUTO: 2.54 10E6/UL (ref 3.8–5.2)
RBC # BLD AUTO: 2.6 10E6/UL (ref 3.8–5.2)
RBC # BLD AUTO: 2.64 10E6/UL (ref 3.8–5.2)
RBC # BLD AUTO: 2.66 10E6/UL (ref 3.8–5.2)
RBC # BLD AUTO: 2.81 10E6/UL (ref 3.8–5.2)
RBC # BLD AUTO: 2.88 10E6/UL (ref 3.8–5.2)
RBC # BLD AUTO: 2.92 10E6/UL (ref 3.8–5.2)
RBC # BLD AUTO: 3.07 10E6/UL (ref 3.8–5.2)
RBC # BLD AUTO: 3.19 10E6/UL (ref 3.8–5.2)
RBC # BLD AUTO: 3.19 10E6/UL (ref 3.8–5.2)
RBC # BLD AUTO: 3.28 10E6/UL (ref 3.8–5.2)
RBC # BLD AUTO: 3.3 10E6/UL (ref 3.8–5.2)
RBC MORPH BLD: ABNORMAL
RHEUMATOID FACT SER NEPH-ACNC: 22 IU/ML
RNAP III IGG SERPL IA-ACNC: 5 UNITS
RSV RNA SPEC QL NAA+PROBE: NOT DETECTED
RV+EV RNA SPEC QL NAA+PROBE: NOT DETECTED
SA 1 CELL: NORMAL
SA 1 CELL: NORMAL
SA 1 TEST METHOD: NORMAL
SA 1 TEST METHOD: NORMAL
SA 2 CELL: NORMAL
SA 2 CELL: NORMAL
SA 2 TEST METHOD: NORMAL
SA 2 TEST METHOD: NORMAL
SA TARGET DNA: NEGATIVE
SA1 HI RISK ABY: NORMAL
SA1 HI RISK ABY: NORMAL
SA1 MOD RISK ABY: NORMAL
SA1 MOD RISK ABY: NORMAL
SA2 HI RISK ABY: NORMAL
SA2 HI RISK ABY: NORMAL
SA2 MOD RISK ABY: NORMAL
SA2 MOD RISK ABY: NORMAL
SAE1 IGG SER QL LINE BLOT: <20 UNITS
SCANNED LAB RESULT: NORMAL
SODIUM BLD-SCNC: 139 MMOL/L (ref 135–145)
SODIUM SERPL-SCNC: 127 MMOL/L (ref 135–145)
SODIUM SERPL-SCNC: 132 MMOL/L (ref 135–145)
SODIUM SERPL-SCNC: 133 MMOL/L (ref 135–145)
SODIUM SERPL-SCNC: 135 MMOL/L (ref 135–145)
SODIUM SERPL-SCNC: 135 MMOL/L (ref 135–145)
SODIUM SERPL-SCNC: 136 MMOL/L (ref 135–145)
SODIUM SERPL-SCNC: 137 MMOL/L (ref 135–145)
SODIUM SERPL-SCNC: 138 MMOL/L (ref 135–145)
SODIUM SERPL-SCNC: 138 MMOL/L (ref 135–145)
SODIUM SERPL-SCNC: 138 MMOL/L (ref 136–145)
SODIUM SERPL-SCNC: 139 MMOL/L (ref 135–145)
SODIUM SERPL-SCNC: 139 MMOL/L (ref 136–145)
SODIUM SERPL-SCNC: 140 MMOL/L (ref 135–145)
SODIUM SERPL-SCNC: 140 MMOL/L (ref 135–145)
SRP AB SERPL QL: NEGATIVE
STRESS ECHO BASELINE DIASTOLIC HE: 64
STRESS ECHO BASELINE HR: 81 BPM
STRESS ECHO BASELINE SYSTOLIC BP: 114
STRESS ECHO CALCULATED PERCENT HR: 56 %
STRESS ECHO LAST STRESS DIASTOLIC BP: 62
STRESS ECHO LAST STRESS SYSTOLIC BP: 118
STRESS ECHO TARGET HR: 160
SYSTOLIC BLOOD PRESSURE - MUSE: NORMAL MMHG
T AXIS - MUSE: -37 DEGREES
T AXIS - MUSE: -54 DEGREES
T AXIS - MUSE: -62 DEGREES
T AXIS - MUSE: -83 DEGREES
T AXIS - MUSE: -86 DEGREES
T AXIS - MUSE: 234 DEGREES
TACROLIMUS BLD-MCNC: 10.6 UG/L (ref 5–15)
TACROLIMUS BLD-MCNC: 11.4 UG/L (ref 5–15)
TACROLIMUS BLD-MCNC: 13.2 UG/L (ref 5–15)
TACROLIMUS BLD-MCNC: 15.2 UG/L (ref 5–15)
TACROLIMUS BLD-MCNC: 15.7 UG/L (ref 5–15)
TACROLIMUS BLD-MCNC: 3.5 UG/L (ref 5–15)
TACROLIMUS BLD-MCNC: 4.6 UG/L (ref 5–15)
TACROLIMUS BLD-MCNC: 5.8 UG/L (ref 5–15)
TACROLIMUS BLD-MCNC: 5.9 UG/L (ref 5–15)
TACROLIMUS BLD-MCNC: 6.8 UG/L (ref 5–15)
TACROLIMUS BLD-MCNC: 6.9 UG/L (ref 5–15)
TACROLIMUS BLD-MCNC: 7 UG/L (ref 5–15)
TACROLIMUS BLD-MCNC: 7.3 UG/L (ref 5–15)
TACROLIMUS BLD-MCNC: 7.6 UG/L (ref 5–15)
TACROLIMUS BLD-MCNC: 7.8 UG/L (ref 5–15)
TACROLIMUS BLD-MCNC: 8.1 UG/L (ref 5–15)
TACROLIMUS BLD-MCNC: 8.5 UG/L (ref 5–15)
TACROLIMUS BLD-MCNC: 9.6 UG/L (ref 5–15)
TEST NAME: NORMAL
TIF1-GAMMA AB SER LINE BLOT-ACNC: <20 UNITS
TME LAST DOSE: ABNORMAL H
TME LAST DOSE: NORMAL H
U1 SNRNP AB SER IA-ACNC: <20 UNITS
U1 SNRNP IGG SER IA-ACNC: 1.9 U/ML
U1 SNRNP IGG SER IA-ACNC: 2.7 U/ML
U1 SNRNP IGG SER IA-ACNC: NEGATIVE
U1 SNRNP IGG SER IA-ACNC: NEGATIVE
U2 SNRNP AB SER QL: NEGATIVE
UNACCEPTABLE ANTIGENS: NORMAL
UNACCEPTABLE ANTIGENS: NORMAL
UNOS CPRA: 88
UNOS CPRA: 88
VANCOMYCIN SERPL-MCNC: 18.6 UG/ML
VENTRICULAR RATE- MUSE: 108 BPM
VENTRICULAR RATE- MUSE: 115 BPM
VENTRICULAR RATE- MUSE: 123 BPM
VENTRICULAR RATE- MUSE: 91 BPM
VENTRICULAR RATE- MUSE: 93 BPM
VENTRICULAR RATE- MUSE: 93 BPM
WBC # BLD AUTO: 1.4 10E3/UL (ref 4–11)
WBC # BLD AUTO: 1.6 10E3/UL (ref 4–11)
WBC # BLD AUTO: 1.6 10E3/UL (ref 4–11)
WBC # BLD AUTO: 11.6 10E3/UL (ref 4–11)
WBC # BLD AUTO: 11.9 10E3/UL (ref 4–11)
WBC # BLD AUTO: 2.1 10E3/UL (ref 4–11)
WBC # BLD AUTO: 2.1 10E3/UL (ref 4–11)
WBC # BLD AUTO: 2.4 10E3/UL (ref 4–11)
WBC # BLD AUTO: 2.9 10E3/UL (ref 4–11)
WBC # BLD AUTO: 2.9 10E3/UL (ref 4–11)
WBC # BLD AUTO: 3.1 10E3/UL (ref 4–11)
WBC # BLD AUTO: 3.9 10E3/UL (ref 4–11)
WBC # BLD AUTO: 5 10E3/UL (ref 4–11)
WBC # BLD AUTO: 5.2 10E3/UL (ref 4–11)
WBC # BLD AUTO: 5.3 10E3/UL (ref 4–11)
WBC # BLD AUTO: 5.4 10E3/UL (ref 4–11)
WBC # BLD AUTO: 5.7 10E3/UL (ref 4–11)
WBC # BLD AUTO: 6.1 10E3/UL (ref 4–11)
WBC # BLD AUTO: 7.6 10E3/UL (ref 4–11)
WBC # BLD AUTO: 7.6 10E3/UL (ref 4–11)
WBC # BLD AUTO: 8.5 10E3/UL (ref 4–11)
WBC # BLD AUTO: 8.8 10E3/UL (ref 4–11)
WBC # BLD AUTO: 9.9 10E3/UL (ref 4–11)
WBC # FLD AUTO: 236 /UL
WBC # FLD AUTO: 267 /UL
WBC # FLD AUTO: 49 /UL
ZZZSA 1  COMMENTS: NORMAL
ZZZSA 1  COMMENTS: NORMAL
ZZZSA 2 COMMENTS: NORMAL
ZZZSA 2 COMMENTS: NORMAL

## 2023-01-01 PROCEDURE — 76882 US LMTD JT/FCL EVL NVASC XTR: CPT | Mod: 26 | Performed by: STUDENT IN AN ORGANIZED HEALTH CARE EDUCATION/TRAINING PROGRAM

## 2023-01-01 PROCEDURE — 250N000009 HC RX 250: Performed by: STUDENT IN AN ORGANIZED HEALTH CARE EDUCATION/TRAINING PROGRAM

## 2023-01-01 PROCEDURE — 36415 COLL VENOUS BLD VENIPUNCTURE: CPT | Performed by: NURSE PRACTITIONER

## 2023-01-01 PROCEDURE — 87798 DETECT AGENT NOS DNA AMP: CPT | Performed by: INTERNAL MEDICINE

## 2023-01-01 PROCEDURE — 710N000012 HC RECOVERY PHASE 2, PER MINUTE: Performed by: INTERNAL MEDICINE

## 2023-01-01 PROCEDURE — 250N000013 HC RX MED GY IP 250 OP 250 PS 637

## 2023-01-01 PROCEDURE — 250N000009 HC RX 250

## 2023-01-01 PROCEDURE — 71046 X-RAY EXAM CHEST 2 VIEWS: CPT

## 2023-01-01 PROCEDURE — 86706 HEP B SURFACE ANTIBODY: CPT | Performed by: STUDENT IN AN ORGANIZED HEALTH CARE EDUCATION/TRAINING PROGRAM

## 2023-01-01 PROCEDURE — 87799 DETECT AGENT NOS DNA QUANT: CPT | Performed by: INTERNAL MEDICINE

## 2023-01-01 PROCEDURE — 99000 SPECIMEN HANDLING OFFICE-LAB: CPT | Performed by: PATHOLOGY

## 2023-01-01 PROCEDURE — 71045 X-RAY EXAM CHEST 1 VIEW: CPT | Mod: 26 | Performed by: RADIOLOGY

## 2023-01-01 PROCEDURE — 80053 COMPREHEN METABOLIC PANEL: CPT

## 2023-01-01 PROCEDURE — 250N000013 HC RX MED GY IP 250 OP 250 PS 637: Performed by: PHYSICIAN ASSISTANT

## 2023-01-01 PROCEDURE — 93017 CV STRESS TEST TRACING ONLY: CPT

## 2023-01-01 PROCEDURE — 87102 FUNGUS ISOLATION CULTURE: CPT | Performed by: INTERNAL MEDICINE

## 2023-01-01 PROCEDURE — 99222 1ST HOSP IP/OBS MODERATE 55: CPT | Performed by: STUDENT IN AN ORGANIZED HEALTH CARE EDUCATION/TRAINING PROGRAM

## 2023-01-01 PROCEDURE — 94640 AIRWAY INHALATION TREATMENT: CPT

## 2023-01-01 PROCEDURE — 999N000157 HC STATISTIC RCP TIME EA 10 MIN

## 2023-01-01 PROCEDURE — 93010 ELECTROCARDIOGRAM REPORT: CPT | Performed by: INTERNAL MEDICINE

## 2023-01-01 PROCEDURE — 83605 ASSAY OF LACTIC ACID: CPT | Performed by: EMERGENCY MEDICINE

## 2023-01-01 PROCEDURE — 36415 COLL VENOUS BLD VENIPUNCTURE: CPT | Performed by: INTERNAL MEDICINE

## 2023-01-01 PROCEDURE — 83735 ASSAY OF MAGNESIUM: CPT | Performed by: PATHOLOGY

## 2023-01-01 PROCEDURE — 250N000012 HC RX MED GY IP 250 OP 636 PS 637

## 2023-01-01 PROCEDURE — G0463 HOSPITAL OUTPT CLINIC VISIT: HCPCS | Mod: 27

## 2023-01-01 PROCEDURE — 80048 BASIC METABOLIC PNL TOTAL CA: CPT | Performed by: ANESTHESIOLOGY

## 2023-01-01 PROCEDURE — 999N000141 HC STATISTIC PRE-PROCEDURE NURSING ASSESSMENT: Performed by: INTERNAL MEDICINE

## 2023-01-01 PROCEDURE — 250N000011 HC RX IP 250 OP 636: Mod: JZ | Performed by: INTERNAL MEDICINE

## 2023-01-01 PROCEDURE — 83735 ASSAY OF MAGNESIUM: CPT

## 2023-01-01 PROCEDURE — 99233 SBSQ HOSP IP/OBS HIGH 50: CPT | Mod: GC | Performed by: INTERNAL MEDICINE

## 2023-01-01 PROCEDURE — 71046 X-RAY EXAM CHEST 2 VIEWS: CPT | Mod: 26 | Performed by: RADIOLOGY

## 2023-01-01 PROCEDURE — 84100 ASSAY OF PHOSPHORUS: CPT

## 2023-01-01 PROCEDURE — 36415 COLL VENOUS BLD VENIPUNCTURE: CPT | Performed by: PATHOLOGY

## 2023-01-01 PROCEDURE — 85027 COMPLETE CBC AUTOMATED: CPT | Performed by: PATHOLOGY

## 2023-01-01 PROCEDURE — 87799 DETECT AGENT NOS DNA QUANT: CPT | Performed by: PHYSICIAN ASSISTANT

## 2023-01-01 PROCEDURE — 250N000011 HC RX IP 250 OP 636: Mod: JZ

## 2023-01-01 PROCEDURE — 99214 OFFICE O/P EST MOD 30 MIN: CPT | Mod: 25 | Performed by: PHYSICIAN ASSISTANT

## 2023-01-01 PROCEDURE — 86706 HEP B SURFACE ANTIBODY: CPT | Performed by: INTERNAL MEDICINE

## 2023-01-01 PROCEDURE — 99233 SBSQ HOSP IP/OBS HIGH 50: CPT | Performed by: INTERNAL MEDICINE

## 2023-01-01 PROCEDURE — 99207 PR NO BILLABLE SERVICE THIS VISIT: CPT | Performed by: STUDENT IN AN ORGANIZED HEALTH CARE EDUCATION/TRAINING PROGRAM

## 2023-01-01 PROCEDURE — 99207 PR NO BILLABLE SERVICE THIS VISIT: CPT | Performed by: NURSE PRACTITIONER

## 2023-01-01 PROCEDURE — 250N000009 HC RX 250: Performed by: NURSE PRACTITIONER

## 2023-01-01 PROCEDURE — 250N000011 HC RX IP 250 OP 636: Mod: JZ | Performed by: PHYSICIAN ASSISTANT

## 2023-01-01 PROCEDURE — 87641 MR-STAPH DNA AMP PROBE: CPT

## 2023-01-01 PROCEDURE — 85025 COMPLETE CBC W/AUTO DIFF WBC: CPT

## 2023-01-01 PROCEDURE — P9047 ALBUMIN (HUMAN), 25%, 50ML: HCPCS | Performed by: INTERNAL MEDICINE

## 2023-01-01 PROCEDURE — 36907 BALO ANGIOP CTR DIALYSIS SEG: CPT | Mod: GC | Performed by: STUDENT IN AN ORGANIZED HEALTH CARE EDUCATION/TRAINING PROGRAM

## 2023-01-01 PROCEDURE — 0B9F8ZX DRAINAGE OF RIGHT LOWER LUNG LOBE, VIA NATURAL OR ARTIFICIAL OPENING ENDOSCOPIC, DIAGNOSTIC: ICD-10-PCS | Performed by: STUDENT IN AN ORGANIZED HEALTH CARE EDUCATION/TRAINING PROGRAM

## 2023-01-01 PROCEDURE — 87205 SMEAR GRAM STAIN: CPT | Performed by: INTERNAL MEDICINE

## 2023-01-01 PROCEDURE — 93971 EXTREMITY STUDY: CPT | Mod: LT

## 2023-01-01 PROCEDURE — 82330 ASSAY OF CALCIUM: CPT

## 2023-01-01 PROCEDURE — 94640 AIRWAY INHALATION TREATMENT: CPT | Mod: 76

## 2023-01-01 PROCEDURE — 93018 CV STRESS TEST I&R ONLY: CPT | Performed by: INTERNAL MEDICINE

## 2023-01-01 PROCEDURE — 87102 FUNGUS ISOLATION CULTURE: CPT | Mod: XU

## 2023-01-01 PROCEDURE — 93971 EXTREMITY STUDY: CPT | Mod: 26 | Performed by: STUDENT IN AN ORGANIZED HEALTH CARE EDUCATION/TRAINING PROGRAM

## 2023-01-01 PROCEDURE — 250N000012 HC RX MED GY IP 250 OP 636 PS 637: Performed by: NURSE PRACTITIONER

## 2023-01-01 PROCEDURE — 80197 ASSAY OF TACROLIMUS: CPT

## 2023-01-01 PROCEDURE — 710N000010 HC RECOVERY PHASE 1, LEVEL 2, PER MIN: Performed by: INTERNAL MEDICINE

## 2023-01-01 PROCEDURE — 85027 COMPLETE CBC AUTOMATED: CPT

## 2023-01-01 PROCEDURE — 89051 BODY FLUID CELL COUNT: CPT

## 2023-01-01 PROCEDURE — 250N000011 HC RX IP 250 OP 636

## 2023-01-01 PROCEDURE — 31624 DX BRONCHOSCOPE/LAVAGE: CPT | Performed by: STUDENT IN AN ORGANIZED HEALTH CARE EDUCATION/TRAINING PROGRAM

## 2023-01-01 PROCEDURE — 87581 M.PNEUMON DNA AMP PROBE: CPT | Performed by: NURSE PRACTITIONER

## 2023-01-01 PROCEDURE — G0463 HOSPITAL OUTPT CLINIC VISIT: HCPCS | Performed by: PHYSICIAN ASSISTANT

## 2023-01-01 PROCEDURE — 360N000084 HC SURGERY LEVEL 4 W/ FLUORO, PER MIN: Performed by: INTERNAL MEDICINE

## 2023-01-01 PROCEDURE — P9045 ALBUMIN (HUMAN), 5%, 250 ML: HCPCS | Mod: JZ | Performed by: INTERNAL MEDICINE

## 2023-01-01 PROCEDURE — 71552 MRI CHEST W/O & W/DYE: CPT | Mod: 26 | Performed by: RADIOLOGY

## 2023-01-01 PROCEDURE — 96415 CHEMO IV INFUSION ADDL HR: CPT

## 2023-01-01 PROCEDURE — 36415 COLL VENOUS BLD VENIPUNCTURE: CPT | Performed by: PHYSICIAN ASSISTANT

## 2023-01-01 PROCEDURE — 85610 PROTHROMBIN TIME: CPT | Performed by: PATHOLOGY

## 2023-01-01 PROCEDURE — 90935 HEMODIALYSIS ONE EVALUATION: CPT

## 2023-01-01 PROCEDURE — 90935 HEMODIALYSIS ONE EVALUATION: CPT | Performed by: STUDENT IN AN ORGANIZED HEALTH CARE EDUCATION/TRAINING PROGRAM

## 2023-01-01 PROCEDURE — 250N000012 HC RX MED GY IP 250 OP 636 PS 637: Performed by: STUDENT IN AN ORGANIZED HEALTH CARE EDUCATION/TRAINING PROGRAM

## 2023-01-01 PROCEDURE — 99233 SBSQ HOSP IP/OBS HIGH 50: CPT | Mod: FS

## 2023-01-01 PROCEDURE — 03JY3ZZ INSPECTION OF UPPER ARTERY, PERCUTANEOUS APPROACH: ICD-10-PCS | Performed by: STUDENT IN AN ORGANIZED HEALTH CARE EDUCATION/TRAINING PROGRAM

## 2023-01-01 PROCEDURE — 99232 SBSQ HOSP IP/OBS MODERATE 35: CPT | Mod: GC | Performed by: STUDENT IN AN ORGANIZED HEALTH CARE EDUCATION/TRAINING PROGRAM

## 2023-01-01 PROCEDURE — 87206 SMEAR FLUORESCENT/ACID STAI: CPT | Mod: XU | Performed by: INTERNAL MEDICINE

## 2023-01-01 PROCEDURE — 999N000208 ECHOCARDIOGRAM COMPLETE

## 2023-01-01 PROCEDURE — 258N000003 HC RX IP 258 OP 636: Performed by: STUDENT IN AN ORGANIZED HEALTH CARE EDUCATION/TRAINING PROGRAM

## 2023-01-01 PROCEDURE — 88312 SPECIAL STAINS GROUP 1: CPT | Mod: TC | Performed by: INTERNAL MEDICINE

## 2023-01-01 PROCEDURE — G1010 CDSM STANSON: HCPCS | Performed by: RADIOLOGY

## 2023-01-01 PROCEDURE — 85007 BL SMEAR W/DIFF WBC COUNT: CPT

## 2023-01-01 PROCEDURE — 258N000003 HC RX IP 258 OP 636: Performed by: INTERNAL MEDICINE

## 2023-01-01 PROCEDURE — 87798 DETECT AGENT NOS DNA AMP: CPT | Mod: XU

## 2023-01-01 PROCEDURE — 87496 CYTOMEG DNA AMP PROBE: CPT

## 2023-01-01 PROCEDURE — 250N000009 HC RX 250: Performed by: INTERNAL MEDICINE

## 2023-01-01 PROCEDURE — 80197 ASSAY OF TACROLIMUS: CPT | Performed by: PHYSICIAN ASSISTANT

## 2023-01-01 PROCEDURE — 85652 RBC SED RATE AUTOMATED: CPT | Performed by: STUDENT IN AN ORGANIZED HEALTH CARE EDUCATION/TRAINING PROGRAM

## 2023-01-01 PROCEDURE — 31624 DX BRONCHOSCOPE/LAVAGE: CPT

## 2023-01-01 PROCEDURE — 71046 X-RAY EXAM CHEST 2 VIEWS: CPT | Mod: GC | Performed by: RADIOLOGY

## 2023-01-01 PROCEDURE — 250N000013 HC RX MED GY IP 250 OP 250 PS 637: Performed by: INTERNAL MEDICINE

## 2023-01-01 PROCEDURE — 87305 ASPERGILLUS AG IA: CPT | Performed by: NURSE PRACTITIONER

## 2023-01-01 PROCEDURE — 82248 BILIRUBIN DIRECT: CPT | Performed by: PATHOLOGY

## 2023-01-01 PROCEDURE — 93016 CV STRESS TEST SUPVJ ONLY: CPT | Performed by: STUDENT IN AN ORGANIZED HEALTH CARE EDUCATION/TRAINING PROGRAM

## 2023-01-01 PROCEDURE — 88312 SPECIAL STAINS GROUP 1: CPT | Mod: 26 | Performed by: PATHOLOGY

## 2023-01-01 PROCEDURE — 120N000002 HC R&B MED SURG/OB UMMC

## 2023-01-01 PROCEDURE — 258N000003 HC RX IP 258 OP 636: Performed by: PHYSICIAN ASSISTANT

## 2023-01-01 PROCEDURE — 255N000002 HC RX 255 OP 636: Performed by: INTERNAL MEDICINE

## 2023-01-01 PROCEDURE — 360N000083 HC SURGERY LEVEL 3 W/ FLUORO, PER MIN: Performed by: INTERNAL MEDICINE

## 2023-01-01 PROCEDURE — 86225 DNA ANTIBODY NATIVE: CPT | Performed by: STUDENT IN AN ORGANIZED HEALTH CARE EDUCATION/TRAINING PROGRAM

## 2023-01-01 PROCEDURE — 88108 CYTOPATH CONCENTRATE TECH: CPT | Mod: TC

## 2023-01-01 PROCEDURE — G1010 CDSM STANSON: HCPCS

## 2023-01-01 PROCEDURE — 84155 ASSAY OF PROTEIN SERUM: CPT

## 2023-01-01 PROCEDURE — 99207 PR NO BILLABLE SERVICE THIS VISIT: CPT

## 2023-01-01 PROCEDURE — 250N000011 HC RX IP 250 OP 636: Performed by: STUDENT IN AN ORGANIZED HEALTH CARE EDUCATION/TRAINING PROGRAM

## 2023-01-01 PROCEDURE — 36415 COLL VENOUS BLD VENIPUNCTURE: CPT

## 2023-01-01 PROCEDURE — 87070 CULTURE OTHR SPECIMN AEROBIC: CPT | Performed by: PHYSICIAN ASSISTANT

## 2023-01-01 PROCEDURE — 83690 ASSAY OF LIPASE: CPT | Performed by: EMERGENCY MEDICINE

## 2023-01-01 PROCEDURE — 86235 NUCLEAR ANTIGEN ANTIBODY: CPT | Performed by: STUDENT IN AN ORGANIZED HEALTH CARE EDUCATION/TRAINING PROGRAM

## 2023-01-01 PROCEDURE — 99233 SBSQ HOSP IP/OBS HIGH 50: CPT | Performed by: PHYSICIAN ASSISTANT

## 2023-01-01 PROCEDURE — 36415 COLL VENOUS BLD VENIPUNCTURE: CPT | Performed by: EMERGENCY MEDICINE

## 2023-01-01 PROCEDURE — 250N000012 HC RX MED GY IP 250 OP 636 PS 637: Performed by: PHYSICIAN ASSISTANT

## 2023-01-01 PROCEDURE — 99207 PR NO BILLABLE SERVICE THIS VISIT: CPT | Mod: GC | Performed by: DERMATOLOGY

## 2023-01-01 PROCEDURE — 93970 EXTREMITY STUDY: CPT | Mod: 26 | Performed by: STUDENT IN AN ORGANIZED HEALTH CARE EDUCATION/TRAINING PROGRAM

## 2023-01-01 PROCEDURE — 93005 ELECTROCARDIOGRAM TRACING: CPT

## 2023-01-01 PROCEDURE — 634N000001 HC RX 634: Mod: JZ | Performed by: STUDENT IN AN ORGANIZED HEALTH CARE EDUCATION/TRAINING PROGRAM

## 2023-01-01 PROCEDURE — 80202 ASSAY OF VANCOMYCIN: CPT

## 2023-01-01 PROCEDURE — 88108 CYTOPATH CONCENTRATE TECH: CPT | Mod: 26 | Performed by: PATHOLOGY

## 2023-01-01 PROCEDURE — 99205 OFFICE O/P NEW HI 60 MIN: CPT | Mod: GC | Performed by: INTERNAL MEDICINE

## 2023-01-01 PROCEDURE — 87077 CULTURE AEROBIC IDENTIFY: CPT | Performed by: STUDENT IN AN ORGANIZED HEALTH CARE EDUCATION/TRAINING PROGRAM

## 2023-01-01 PROCEDURE — 99223 1ST HOSP IP/OBS HIGH 75: CPT | Mod: GC | Performed by: INTERNAL MEDICINE

## 2023-01-01 PROCEDURE — 99152 MOD SED SAME PHYS/QHP 5/>YRS: CPT

## 2023-01-01 PROCEDURE — 84295 ASSAY OF SERUM SODIUM: CPT | Mod: 91 | Performed by: PHYSICIAN ASSISTANT

## 2023-01-01 PROCEDURE — 93306 TTE W/DOPPLER COMPLETE: CPT | Mod: 26 | Performed by: INTERNAL MEDICINE

## 2023-01-01 PROCEDURE — 94375 RESPIRATORY FLOW VOLUME LOOP: CPT | Performed by: PHYSICIAN ASSISTANT

## 2023-01-01 PROCEDURE — 99153 MOD SED SAME PHYS/QHP EA: CPT

## 2023-01-01 PROCEDURE — 76705 ECHO EXAM OF ABDOMEN: CPT | Mod: 26 | Performed by: STUDENT IN AN ORGANIZED HEALTH CARE EDUCATION/TRAINING PROGRAM

## 2023-01-01 PROCEDURE — 999N000128 HC STATISTIC PERIPHERAL IV START W/O US GUIDANCE

## 2023-01-01 PROCEDURE — 250N000011 HC RX IP 250 OP 636: Performed by: INTERNAL MEDICINE

## 2023-01-01 PROCEDURE — 250N000011 HC RX IP 250 OP 636: Mod: JZ | Performed by: STUDENT IN AN ORGANIZED HEALTH CARE EDUCATION/TRAINING PROGRAM

## 2023-01-01 PROCEDURE — 71045 X-RAY EXAM CHEST 1 VIEW: CPT

## 2023-01-01 PROCEDURE — G1010 CDSM STANSON: HCPCS | Mod: GC | Performed by: RADIOLOGY

## 2023-01-01 PROCEDURE — 86200 CCP ANTIBODY: CPT | Performed by: STUDENT IN AN ORGANIZED HEALTH CARE EDUCATION/TRAINING PROGRAM

## 2023-01-01 PROCEDURE — 74018 RADEX ABDOMEN 1 VIEW: CPT | Mod: 26 | Performed by: RADIOLOGY

## 2023-01-01 PROCEDURE — 85027 COMPLETE CBC AUTOMATED: CPT | Performed by: INTERNAL MEDICINE

## 2023-01-01 PROCEDURE — 272N000001 HC OR GENERAL SUPPLY STERILE: Performed by: INTERNAL MEDICINE

## 2023-01-01 PROCEDURE — 87633 RESP VIRUS 12-25 TARGETS: CPT

## 2023-01-01 PROCEDURE — 31624 DX BRONCHOSCOPE/LAVAGE: CPT | Mod: GC

## 2023-01-01 PROCEDURE — 99213 OFFICE O/P EST LOW 20 MIN: CPT | Mod: VID | Performed by: STUDENT IN AN ORGANIZED HEALTH CARE EDUCATION/TRAINING PROGRAM

## 2023-01-01 PROCEDURE — 82310 ASSAY OF CALCIUM: CPT | Performed by: INTERNAL MEDICINE

## 2023-01-01 PROCEDURE — 0B738ZZ DILATION OF RIGHT MAIN BRONCHUS, VIA NATURAL OR ARTIFICIAL OPENING ENDOSCOPIC: ICD-10-PCS | Performed by: STUDENT IN AN ORGANIZED HEALTH CARE EDUCATION/TRAINING PROGRAM

## 2023-01-01 PROCEDURE — 87496 CYTOMEG DNA AMP PROBE: CPT | Performed by: STUDENT IN AN ORGANIZED HEALTH CARE EDUCATION/TRAINING PROGRAM

## 2023-01-01 PROCEDURE — 343N000001 HC RX 343: Performed by: INTERNAL MEDICINE

## 2023-01-01 PROCEDURE — 88312 SPECIAL STAINS GROUP 1: CPT | Mod: TC | Performed by: STUDENT IN AN ORGANIZED HEALTH CARE EDUCATION/TRAINING PROGRAM

## 2023-01-01 PROCEDURE — 71250 CT THORAX DX C-: CPT | Mod: MG | Performed by: RADIOLOGY

## 2023-01-01 PROCEDURE — 82785 ASSAY OF IGE: CPT

## 2023-01-01 PROCEDURE — 87102 FUNGUS ISOLATION CULTURE: CPT | Performed by: STUDENT IN AN ORGANIZED HEALTH CARE EDUCATION/TRAINING PROGRAM

## 2023-01-01 PROCEDURE — 87210 SMEAR WET MOUNT SALINE/INK: CPT | Performed by: INTERNAL MEDICINE

## 2023-01-01 PROCEDURE — 93971 EXTREMITY STUDY: CPT | Mod: 26 | Performed by: RADIOLOGY

## 2023-01-01 PROCEDURE — 87340 HEPATITIS B SURFACE AG IA: CPT | Performed by: STUDENT IN AN ORGANIZED HEALTH CARE EDUCATION/TRAINING PROGRAM

## 2023-01-01 PROCEDURE — 370N000017 HC ANESTHESIA TECHNICAL FEE, PER MIN: Performed by: INTERNAL MEDICINE

## 2023-01-01 PROCEDURE — 93970 EXTREMITY STUDY: CPT

## 2023-01-01 PROCEDURE — 80187 DRUG ASSAY POSACONAZOLE: CPT | Performed by: NURSE PRACTITIONER

## 2023-01-01 PROCEDURE — 85014 HEMATOCRIT: CPT

## 2023-01-01 PROCEDURE — 87116 MYCOBACTERIA CULTURE: CPT | Performed by: STUDENT IN AN ORGANIZED HEALTH CARE EDUCATION/TRAINING PROGRAM

## 2023-01-01 PROCEDURE — 83605 ASSAY OF LACTIC ACID: CPT | Performed by: INTERNAL MEDICINE

## 2023-01-01 PROCEDURE — 258N000003 HC RX IP 258 OP 636

## 2023-01-01 PROCEDURE — 85025 COMPLETE CBC W/AUTO DIFF WBC: CPT | Performed by: EMERGENCY MEDICINE

## 2023-01-01 PROCEDURE — 99207 PR NO CHARGE LOS: CPT

## 2023-01-01 PROCEDURE — 99205 OFFICE O/P NEW HI 60 MIN: CPT | Mod: VID | Performed by: FAMILY MEDICINE

## 2023-01-01 PROCEDURE — 272N000504 HC NEEDLE CR4

## 2023-01-01 PROCEDURE — G0463 HOSPITAL OUTPT CLINIC VISIT: HCPCS | Performed by: INTERNAL MEDICINE

## 2023-01-01 PROCEDURE — 76937 US GUIDE VASCULAR ACCESS: CPT | Mod: 26 | Performed by: STUDENT IN AN ORGANIZED HEALTH CARE EDUCATION/TRAINING PROGRAM

## 2023-01-01 PROCEDURE — C1726 CATH, BAL DIL, NON-VASCULAR: HCPCS | Performed by: INTERNAL MEDICINE

## 2023-01-01 PROCEDURE — 85025 COMPLETE CBC W/AUTO DIFF WBC: CPT | Performed by: INTERNAL MEDICINE

## 2023-01-01 PROCEDURE — 80187 DRUG ASSAY POSACONAZOLE: CPT | Performed by: PHYSICIAN ASSISTANT

## 2023-01-01 PROCEDURE — 84145 PROCALCITONIN (PCT): CPT

## 2023-01-01 PROCEDURE — 36901 INTRO CATH DIALYSIS CIRCUIT: CPT | Mod: GC | Performed by: STUDENT IN AN ORGANIZED HEALTH CARE EDUCATION/TRAINING PROGRAM

## 2023-01-01 PROCEDURE — 83516 IMMUNOASSAY NONANTIBODY: CPT | Performed by: STUDENT IN AN ORGANIZED HEALTH CARE EDUCATION/TRAINING PROGRAM

## 2023-01-01 PROCEDURE — 87070 CULTURE OTHR SPECIMN AEROBIC: CPT | Performed by: STUDENT IN AN ORGANIZED HEALTH CARE EDUCATION/TRAINING PROGRAM

## 2023-01-01 PROCEDURE — 87305 ASPERGILLUS AG IA: CPT

## 2023-01-01 PROCEDURE — 999N000111 HC STATISTIC OT IP EVAL DEFER: Performed by: OCCUPATIONAL THERAPIST

## 2023-01-01 PROCEDURE — 31630 BRONCHOSCOPY DILATE/FX REPR: CPT | Performed by: STUDENT IN AN ORGANIZED HEALTH CARE EDUCATION/TRAINING PROGRAM

## 2023-01-01 PROCEDURE — G1010 CDSM STANSON: HCPCS | Mod: GC | Performed by: STUDENT IN AN ORGANIZED HEALTH CARE EDUCATION/TRAINING PROGRAM

## 2023-01-01 PROCEDURE — G0500 MOD SEDAT ENDO SERVICE >5YRS: HCPCS | Performed by: STUDENT IN AN ORGANIZED HEALTH CARE EDUCATION/TRAINING PROGRAM

## 2023-01-01 PROCEDURE — 99285 EMERGENCY DEPT VISIT HI MDM: CPT | Mod: 25 | Performed by: EMERGENCY MEDICINE

## 2023-01-01 PROCEDURE — 87486 CHLMYD PNEUM DNA AMP PROBE: CPT | Mod: XU

## 2023-01-01 PROCEDURE — 87493 C DIFF AMPLIFIED PROBE: CPT

## 2023-01-01 PROCEDURE — 99233 SBSQ HOSP IP/OBS HIGH 50: CPT | Mod: GC | Performed by: STUDENT IN AN ORGANIZED HEALTH CARE EDUCATION/TRAINING PROGRAM

## 2023-01-01 PROCEDURE — 86352 CELL FUNCTION ASSAY W/STIM: CPT | Performed by: NURSE PRACTITIONER

## 2023-01-01 PROCEDURE — 250N000011 HC RX IP 250 OP 636: Performed by: RADIOLOGY

## 2023-01-01 PROCEDURE — 85025 COMPLETE CBC W/AUTO DIFF WBC: CPT | Performed by: PHYSICIAN ASSISTANT

## 2023-01-01 PROCEDURE — 86704 HEP B CORE ANTIBODY TOTAL: CPT | Performed by: INTERNAL MEDICINE

## 2023-01-01 PROCEDURE — 80197 ASSAY OF TACROLIMUS: CPT | Performed by: INTERNAL MEDICINE

## 2023-01-01 PROCEDURE — 83735 ASSAY OF MAGNESIUM: CPT | Performed by: PHYSICIAN ASSISTANT

## 2023-01-01 PROCEDURE — 36901 INTRO CATH DIALYSIS CIRCUIT: CPT

## 2023-01-01 PROCEDURE — 86352 CELL FUNCTION ASSAY W/STIM: CPT | Performed by: INTERNAL MEDICINE

## 2023-01-01 PROCEDURE — 99152 MOD SED SAME PHYS/QHP 5/>YRS: CPT | Mod: GC | Performed by: STUDENT IN AN ORGANIZED HEALTH CARE EDUCATION/TRAINING PROGRAM

## 2023-01-01 PROCEDURE — 71250 CT THORAX DX C-: CPT | Performed by: RADIOLOGY

## 2023-01-01 PROCEDURE — 36907 BALO ANGIOP CTR DIALYSIS SEG: CPT

## 2023-01-01 PROCEDURE — A9502 TC99M TETROFOSMIN: HCPCS | Performed by: INTERNAL MEDICINE

## 2023-01-01 PROCEDURE — 250N000011 HC RX IP 250 OP 636: Mod: JZ | Performed by: EMERGENCY MEDICINE

## 2023-01-01 PROCEDURE — 99215 OFFICE O/P EST HI 40 MIN: CPT | Performed by: INTERNAL MEDICINE

## 2023-01-01 PROCEDURE — C1769 GUIDE WIRE: HCPCS

## 2023-01-01 PROCEDURE — 99222 1ST HOSP IP/OBS MODERATE 55: CPT | Mod: FS | Performed by: NURSE PRACTITIONER

## 2023-01-01 PROCEDURE — 86140 C-REACTIVE PROTEIN: CPT | Performed by: STUDENT IN AN ORGANIZED HEALTH CARE EDUCATION/TRAINING PROGRAM

## 2023-01-01 PROCEDURE — 71275 CT ANGIOGRAPHY CHEST: CPT | Mod: MF

## 2023-01-01 PROCEDURE — 87015 SPECIMEN INFECT AGNT CONCNTJ: CPT | Performed by: STUDENT IN AN ORGANIZED HEALTH CARE EDUCATION/TRAINING PROGRAM

## 2023-01-01 PROCEDURE — 86833 HLA CLASS II HIGH DEFIN QUAL: CPT | Performed by: NURSE PRACTITIONER

## 2023-01-01 PROCEDURE — 99215 OFFICE O/P EST HI 40 MIN: CPT | Mod: 25 | Performed by: PHYSICIAN ASSISTANT

## 2023-01-01 PROCEDURE — 87899 AGENT NOS ASSAY W/OPTIC: CPT

## 2023-01-01 PROCEDURE — 31630 BRONCHOSCOPY DILATE/FX REPR: CPT | Mod: GC | Performed by: STUDENT IN AN ORGANIZED HEALTH CARE EDUCATION/TRAINING PROGRAM

## 2023-01-01 PROCEDURE — 255N000002 HC RX 255 OP 636: Performed by: STUDENT IN AN ORGANIZED HEALTH CARE EDUCATION/TRAINING PROGRAM

## 2023-01-01 PROCEDURE — 36415 COLL VENOUS BLD VENIPUNCTURE: CPT | Performed by: STUDENT IN AN ORGANIZED HEALTH CARE EDUCATION/TRAINING PROGRAM

## 2023-01-01 PROCEDURE — 87449 NOS EACH ORGANISM AG IA: CPT | Performed by: NURSE PRACTITIONER

## 2023-01-01 PROCEDURE — P9045 ALBUMIN (HUMAN), 5%, 250 ML: HCPCS | Mod: JZ | Performed by: PHYSICIAN ASSISTANT

## 2023-01-01 PROCEDURE — 31899 UNLISTED PX TRACHEA BRONCHI: CPT | Performed by: INTERNAL MEDICINE

## 2023-01-01 PROCEDURE — 86832 HLA CLASS I HIGH DEFIN QUAL: CPT | Performed by: NURSE PRACTITIONER

## 2023-01-01 PROCEDURE — 99214 OFFICE O/P EST MOD 30 MIN: CPT | Mod: VID | Performed by: PHYSICIAN ASSISTANT

## 2023-01-01 PROCEDURE — A9585 GADOBUTROL INJECTION: HCPCS | Mod: JZ | Performed by: STUDENT IN AN ORGANIZED HEALTH CARE EDUCATION/TRAINING PROGRAM

## 2023-01-01 PROCEDURE — 80053 COMPREHEN METABOLIC PANEL: CPT | Performed by: EMERGENCY MEDICINE

## 2023-01-01 PROCEDURE — 74176 CT ABD & PELVIS W/O CONTRAST: CPT | Mod: 26 | Performed by: STUDENT IN AN ORGANIZED HEALTH CARE EDUCATION/TRAINING PROGRAM

## 2023-01-01 PROCEDURE — 71552 MRI CHEST W/O & W/DYE: CPT | Mod: MG

## 2023-01-01 PROCEDURE — 99239 HOSP IP/OBS DSCHRG MGMT >30: CPT | Mod: GC | Performed by: STUDENT IN AN ORGANIZED HEALTH CARE EDUCATION/TRAINING PROGRAM

## 2023-01-01 PROCEDURE — G0500 MOD SEDAT ENDO SERVICE >5YRS: HCPCS

## 2023-01-01 PROCEDURE — 87040 BLOOD CULTURE FOR BACTERIA: CPT | Performed by: EMERGENCY MEDICINE

## 2023-01-01 PROCEDURE — 82784 ASSAY IGA/IGD/IGG/IGM EACH: CPT | Performed by: NURSE PRACTITIONER

## 2023-01-01 PROCEDURE — 87205 SMEAR GRAM STAIN: CPT | Performed by: PHYSICIAN ASSISTANT

## 2023-01-01 PROCEDURE — 80048 BASIC METABOLIC PNL TOTAL CA: CPT | Performed by: PATHOLOGY

## 2023-01-01 PROCEDURE — 31641 BRONCHOSCOPY TREAT BLOCKAGE: CPT | Performed by: INTERNAL MEDICINE

## 2023-01-01 PROCEDURE — 84999 UNLISTED CHEMISTRY PROCEDURE: CPT | Mod: XU

## 2023-01-01 PROCEDURE — 93000 ELECTROCARDIOGRAM COMPLETE: CPT | Performed by: INTERNAL MEDICINE

## 2023-01-01 PROCEDURE — 86832 HLA CLASS I HIGH DEFIN QUAL: CPT | Performed by: PHYSICIAN ASSISTANT

## 2023-01-01 PROCEDURE — 87205 SMEAR GRAM STAIN: CPT | Mod: XU

## 2023-01-01 PROCEDURE — 87116 MYCOBACTERIA CULTURE: CPT

## 2023-01-01 PROCEDURE — 05763Z1 DILATION OF LEFT SUBCLAVIAN VEIN USING DRUG-COATED BALLOON, PERCUTANEOUS APPROACH: ICD-10-PCS | Performed by: STUDENT IN AN ORGANIZED HEALTH CARE EDUCATION/TRAINING PROGRAM

## 2023-01-01 PROCEDURE — 71275 CT ANGIOGRAPHY CHEST: CPT | Mod: 26 | Performed by: RADIOLOGY

## 2023-01-01 PROCEDURE — C2623 CATH, TRANSLUMIN, DRUG-COAT: HCPCS

## 2023-01-01 PROCEDURE — 85025 COMPLETE CBC W/AUTO DIFF WBC: CPT | Performed by: PATHOLOGY

## 2023-01-01 PROCEDURE — 84999 UNLISTED CHEMISTRY PROCEDURE: CPT | Performed by: PHYSICIAN ASSISTANT

## 2023-01-01 PROCEDURE — 87206 SMEAR FLUORESCENT/ACID STAI: CPT | Performed by: STUDENT IN AN ORGANIZED HEALTH CARE EDUCATION/TRAINING PROGRAM

## 2023-01-01 PROCEDURE — 87205 SMEAR GRAM STAIN: CPT | Performed by: STUDENT IN AN ORGANIZED HEALTH CARE EDUCATION/TRAINING PROGRAM

## 2023-01-01 PROCEDURE — 87340 HEPATITIS B SURFACE AG IA: CPT | Performed by: INTERNAL MEDICINE

## 2023-01-01 PROCEDURE — 86160 COMPLEMENT ANTIGEN: CPT | Performed by: STUDENT IN AN ORGANIZED HEALTH CARE EDUCATION/TRAINING PROGRAM

## 2023-01-01 PROCEDURE — 86833 HLA CLASS II HIGH DEFIN QUAL: CPT | Performed by: PHYSICIAN ASSISTANT

## 2023-01-01 PROCEDURE — 76882 US LMTD JT/FCL EVL NVASC XTR: CPT | Mod: LT

## 2023-01-01 PROCEDURE — 272N000564 HC SHEATH CR2

## 2023-01-01 PROCEDURE — 85652 RBC SED RATE AUTOMATED: CPT

## 2023-01-01 PROCEDURE — 76705 ECHO EXAM OF ABDOMEN: CPT

## 2023-01-01 PROCEDURE — 71260 CT THORAX DX C+: CPT | Mod: 26 | Performed by: RADIOLOGY

## 2023-01-01 PROCEDURE — 89050 BODY FLUID CELL COUNT: CPT | Performed by: STUDENT IN AN ORGANIZED HEALTH CARE EDUCATION/TRAINING PROGRAM

## 2023-01-01 PROCEDURE — 71260 CT THORAX DX C+: CPT | Mod: MG

## 2023-01-01 PROCEDURE — 99285 EMERGENCY DEPT VISIT HI MDM: CPT | Performed by: EMERGENCY MEDICINE

## 2023-01-01 PROCEDURE — 78452 HT MUSCLE IMAGE SPECT MULT: CPT | Mod: 26 | Performed by: INTERNAL MEDICINE

## 2023-01-01 PROCEDURE — 93990 DOPPLER FLOW TESTING: CPT

## 2023-01-01 PROCEDURE — 87799 DETECT AGENT NOS DNA QUANT: CPT

## 2023-01-01 PROCEDURE — 86352 CELL FUNCTION ASSAY W/STIM: CPT | Performed by: PHYSICIAN ASSISTANT

## 2023-01-01 PROCEDURE — 87081 CULTURE SCREEN ONLY: CPT | Performed by: INTERNAL MEDICINE

## 2023-01-01 PROCEDURE — 82550 ASSAY OF CK (CPK): CPT

## 2023-01-01 PROCEDURE — 31624 DX BRONCHOSCOPE/LAVAGE: CPT | Mod: GC | Performed by: STUDENT IN AN ORGANIZED HEALTH CARE EDUCATION/TRAINING PROGRAM

## 2023-01-01 PROCEDURE — 99222 1ST HOSP IP/OBS MODERATE 55: CPT | Mod: GC | Performed by: INTERNAL MEDICINE

## 2023-01-01 PROCEDURE — C1725 CATH, TRANSLUMIN NON-LASER: HCPCS

## 2023-01-01 PROCEDURE — 87305 ASPERGILLUS AG IA: CPT | Mod: 90 | Performed by: PATHOLOGY

## 2023-01-01 PROCEDURE — 87206 SMEAR FLUORESCENT/ACID STAI: CPT

## 2023-01-01 PROCEDURE — 99214 OFFICE O/P EST MOD 30 MIN: CPT | Mod: 25 | Performed by: INTERNAL MEDICINE

## 2023-01-01 PROCEDURE — 80187 DRUG ASSAY POSACONAZOLE: CPT

## 2023-01-01 PROCEDURE — 99222 1ST HOSP IP/OBS MODERATE 55: CPT | Mod: GC | Performed by: DERMATOLOGY

## 2023-01-01 PROCEDURE — 87449 NOS EACH ORGANISM AG IA: CPT | Mod: 90 | Performed by: PATHOLOGY

## 2023-01-01 PROCEDURE — 87015 SPECIMEN INFECT AGNT CONCNTJ: CPT

## 2023-01-01 PROCEDURE — 71250 CT THORAX DX C-: CPT | Mod: 26 | Performed by: RADIOLOGY

## 2023-01-01 PROCEDURE — 80187 DRUG ASSAY POSACONAZOLE: CPT | Performed by: INTERNAL MEDICINE

## 2023-01-01 PROCEDURE — 83605 ASSAY OF LACTIC ACID: CPT

## 2023-01-01 PROCEDURE — 96413 CHEMO IV INFUSION 1 HR: CPT

## 2023-01-01 PROCEDURE — 258N000003 HC RX IP 258 OP 636: Performed by: EMERGENCY MEDICINE

## 2023-01-01 PROCEDURE — 99152 MOD SED SAME PHYS/QHP 5/>YRS: CPT | Mod: GC

## 2023-01-01 PROCEDURE — G1010 CDSM STANSON: HCPCS | Performed by: INTERNAL MEDICINE

## 2023-01-01 PROCEDURE — 31630 BRONCHOSCOPY DILATE/FX REPR: CPT | Mod: GC | Performed by: INTERNAL MEDICINE

## 2023-01-01 PROCEDURE — 250N000009 HC RX 250: Performed by: NURSE ANESTHETIST, CERTIFIED REGISTERED

## 2023-01-01 PROCEDURE — 5A1D70Z PERFORMANCE OF URINARY FILTRATION, INTERMITTENT, LESS THAN 6 HOURS PER DAY: ICD-10-PCS | Performed by: STUDENT IN AN ORGANIZED HEALTH CARE EDUCATION/TRAINING PROGRAM

## 2023-01-01 PROCEDURE — 80197 ASSAY OF TACROLIMUS: CPT | Performed by: NURSE PRACTITIONER

## 2023-01-01 PROCEDURE — 93990 DOPPLER FLOW TESTING: CPT | Mod: 26 | Performed by: RADIOLOGY

## 2023-01-01 PROCEDURE — 80053 COMPREHEN METABOLIC PANEL: CPT | Performed by: PATHOLOGY

## 2023-01-01 PROCEDURE — 250N000013 HC RX MED GY IP 250 OP 250 PS 637: Performed by: STUDENT IN AN ORGANIZED HEALTH CARE EDUCATION/TRAINING PROGRAM

## 2023-01-01 PROCEDURE — 05743Z1 DILATION OF LEFT INNOMINATE VEIN USING DRUG-COATED BALLOON, PERCUTANEOUS APPROACH: ICD-10-PCS | Performed by: STUDENT IN AN ORGANIZED HEALTH CARE EDUCATION/TRAINING PROGRAM

## 2023-01-01 PROCEDURE — 272N000302 HC DEVICE INFLATION CR5

## 2023-01-01 PROCEDURE — 86431 RHEUMATOID FACTOR QUANT: CPT | Performed by: STUDENT IN AN ORGANIZED HEALTH CARE EDUCATION/TRAINING PROGRAM

## 2023-01-01 PROCEDURE — 82085 ASSAY OF ALDOLASE: CPT

## 2023-01-01 PROCEDURE — 74018 RADEX ABDOMEN 1 VIEW: CPT

## 2023-01-01 PROCEDURE — 87581 M.PNEUMON DNA AMP PROBE: CPT | Performed by: STUDENT IN AN ORGANIZED HEALTH CARE EDUCATION/TRAINING PROGRAM

## 2023-01-01 PROCEDURE — 87077 CULTURE AEROBIC IDENTIFY: CPT

## 2023-01-01 PROCEDURE — 255N000002 HC RX 255 OP 636: Mod: JZ | Performed by: STUDENT IN AN ORGANIZED HEALTH CARE EDUCATION/TRAINING PROGRAM

## 2023-01-01 PROCEDURE — G0463 HOSPITAL OUTPT CLINIC VISIT: HCPCS | Mod: 25 | Performed by: INTERNAL MEDICINE

## 2023-01-01 RX ORDER — LIDOCAINE HYDROCHLORIDE 20 MG/ML
INJECTION, SOLUTION INFILTRATION; PERINEURAL PRN
Status: DISCONTINUED | OUTPATIENT
Start: 2023-01-01 | End: 2023-01-01 | Stop reason: HOSPADM

## 2023-01-01 RX ORDER — PANTOPRAZOLE SODIUM 40 MG/1
40 TABLET, DELAYED RELEASE ORAL
Status: DISCONTINUED | OUTPATIENT
Start: 2023-01-01 | End: 2023-01-01 | Stop reason: HOSPADM

## 2023-01-01 RX ORDER — SODIUM CHLORIDE, SODIUM LACTATE, POTASSIUM CHLORIDE, CALCIUM CHLORIDE 600; 310; 30; 20 MG/100ML; MG/100ML; MG/100ML; MG/100ML
INJECTION, SOLUTION INTRAVENOUS CONTINUOUS
Status: DISCONTINUED | OUTPATIENT
Start: 2023-01-01 | End: 2023-01-01 | Stop reason: HOSPADM

## 2023-01-01 RX ORDER — EPINEPHRINE 1 MG/ML
0.3 INJECTION, SOLUTION, CONCENTRATE INTRAVENOUS EVERY 5 MIN PRN
Status: CANCELLED | OUTPATIENT
Start: 2023-01-01

## 2023-01-01 RX ORDER — LIDOCAINE/PRILOCAINE 2.5 %-2.5%
CREAM (GRAM) TOPICAL
Status: ON HOLD | COMMUNITY
Start: 2022-02-04 | End: 2023-01-01

## 2023-01-01 RX ORDER — FLUMAZENIL 0.1 MG/ML
0.2 INJECTION, SOLUTION INTRAVENOUS
Status: DISCONTINUED | OUTPATIENT
Start: 2023-01-01 | End: 2023-01-01 | Stop reason: HOSPADM

## 2023-01-01 RX ORDER — TACROLIMUS 1 MG/1
1 CAPSULE ORAL EVERY EVENING
Qty: 90 CAPSULE | Refills: 3 | Status: SHIPPED | OUTPATIENT
Start: 2023-01-01 | End: 2023-01-01

## 2023-01-01 RX ORDER — SODIUM PHOSPHATE, MONOBASIC, MONOHYDRATE 276; 142 MG/ML; MG/ML
35-105 INJECTION, SOLUTION INTRAVENOUS
Status: COMPLETED | OUTPATIENT
Start: 2023-01-01 | End: 2023-01-01

## 2023-01-01 RX ORDER — PROCHLORPERAZINE 25 MG
25 SUPPOSITORY, RECTAL RECTAL EVERY 12 HOURS PRN
Status: DISCONTINUED | OUTPATIENT
Start: 2023-01-01 | End: 2023-01-01 | Stop reason: HOSPADM

## 2023-01-01 RX ORDER — AMOXICILLIN 250 MG
2 CAPSULE ORAL
COMMUNITY
Start: 2023-04-15 | End: 2023-01-01

## 2023-01-01 RX ORDER — ACETYLCYSTEINE 100 MG/ML
4 SOLUTION ORAL; RESPIRATORY (INHALATION) 3 TIMES DAILY PRN
Start: 2023-01-01

## 2023-01-01 RX ORDER — TACROLIMUS 0.5 MG/1
0.5 CAPSULE ORAL 2 TIMES DAILY
Qty: 60 CAPSULE | Refills: 0 | Status: SHIPPED | OUTPATIENT
Start: 2023-01-01 | End: 2023-01-01

## 2023-01-01 RX ORDER — SULFAMETHOXAZOLE AND TRIMETHOPRIM 400; 80 MG/1; MG/1
1 TABLET ORAL
Qty: 13 TABLET | Refills: 11 | Status: SHIPPED | OUTPATIENT
Start: 2023-01-01

## 2023-01-01 RX ORDER — AZITHROMYCIN 250 MG/1
250 TABLET, FILM COATED ORAL DAILY
Status: DISCONTINUED | OUTPATIENT
Start: 2023-01-01 | End: 2023-01-01

## 2023-01-01 RX ORDER — IPRATROPIUM BROMIDE 42 UG/1
2 SPRAY, METERED NASAL 4 TIMES DAILY
Qty: 15 ML | Refills: 3 | Status: SHIPPED | OUTPATIENT
Start: 2023-01-01 | End: 2024-01-01

## 2023-01-01 RX ORDER — DIPHENHYDRAMINE HYDROCHLORIDE 50 MG/ML
50 INJECTION INTRAMUSCULAR; INTRAVENOUS
Status: CANCELLED
Start: 2023-01-01

## 2023-01-01 RX ORDER — ONDANSETRON 2 MG/ML
INJECTION INTRAMUSCULAR; INTRAVENOUS PRN
Status: DISCONTINUED | OUTPATIENT
Start: 2023-01-01 | End: 2023-01-01

## 2023-01-01 RX ORDER — HEPARIN SODIUM (PORCINE) LOCK FLUSH IV SOLN 100 UNIT/ML 100 UNIT/ML
5 SOLUTION INTRAVENOUS
Status: CANCELLED | OUTPATIENT
Start: 2023-01-01

## 2023-01-01 RX ORDER — METHYLPREDNISOLONE SODIUM SUCCINATE 125 MG/2ML
125 INJECTION, POWDER, LYOPHILIZED, FOR SOLUTION INTRAMUSCULAR; INTRAVENOUS
Status: CANCELLED
Start: 2023-01-01

## 2023-01-01 RX ORDER — TACROLIMUS 0.5 MG/1
0.5 CAPSULE ORAL 2 TIMES DAILY
Qty: 60 CAPSULE | Refills: 11 | Status: SHIPPED | OUTPATIENT
Start: 2023-01-01 | End: 2023-01-01

## 2023-01-01 RX ORDER — BENZONATATE 100 MG/1
100 CAPSULE ORAL EVERY 4 HOURS PRN
Qty: 30 CAPSULE | Refills: 0 | Status: SHIPPED | OUTPATIENT
Start: 2023-01-01

## 2023-01-01 RX ORDER — HYDROMORPHONE HCL IN WATER/PF 6 MG/30 ML
0.2 PATIENT CONTROLLED ANALGESIA SYRINGE INTRAVENOUS EVERY 5 MIN PRN
Status: DISCONTINUED | OUTPATIENT
Start: 2023-01-01 | End: 2023-01-01 | Stop reason: HOSPADM

## 2023-01-01 RX ORDER — ACETYLCYSTEINE 100 MG/ML
4 SOLUTION ORAL; RESPIRATORY (INHALATION) 2 TIMES DAILY PRN
Status: DISCONTINUED | OUTPATIENT
Start: 2023-01-01 | End: 2023-01-01 | Stop reason: HOSPADM

## 2023-01-01 RX ORDER — PREDNISONE 20 MG/1
TABLET ORAL
Qty: 11 TABLET | Refills: 0 | Status: SHIPPED | OUTPATIENT
Start: 2023-01-01 | End: 2023-01-01

## 2023-01-01 RX ORDER — NALOXONE HYDROCHLORIDE 0.4 MG/ML
0.4 INJECTION, SOLUTION INTRAMUSCULAR; INTRAVENOUS; SUBCUTANEOUS
Status: DISCONTINUED | OUTPATIENT
Start: 2023-01-01 | End: 2023-01-01 | Stop reason: HOSPADM

## 2023-01-01 RX ORDER — PREDNISONE 20 MG/1
20 TABLET ORAL EVERY MORNING
Status: COMPLETED | OUTPATIENT
Start: 2023-01-01 | End: 2023-01-01

## 2023-01-01 RX ORDER — ALBUTEROL SULFATE 90 UG/1
1-2 AEROSOL, METERED RESPIRATORY (INHALATION)
Status: CANCELLED
Start: 2023-01-01

## 2023-01-01 RX ORDER — NALOXONE HYDROCHLORIDE 0.4 MG/ML
0.4 INJECTION, SOLUTION INTRAMUSCULAR; INTRAVENOUS; SUBCUTANEOUS
Status: DISCONTINUED | OUTPATIENT
Start: 2023-01-01 | End: 2023-01-01

## 2023-01-01 RX ORDER — VALGANCICLOVIR 450 MG/1
450 TABLET, FILM COATED ORAL
Status: DISCONTINUED | OUTPATIENT
Start: 2023-01-01 | End: 2023-01-01

## 2023-01-01 RX ORDER — TACROLIMUS 0.5 MG/1
0.5 CAPSULE ORAL EVERY MORNING
Qty: 90 CAPSULE | Refills: 3 | Status: SHIPPED | OUTPATIENT
Start: 2023-01-01 | End: 2023-01-01

## 2023-01-01 RX ORDER — ALBUTEROL SULFATE 0.83 MG/ML
2.5 SOLUTION RESPIRATORY (INHALATION) 4 TIMES DAILY
Start: 2023-01-01 | End: 2023-01-01

## 2023-01-01 RX ORDER — MAGNESIUM HYDROXIDE 1200 MG/15ML
LIQUID ORAL PRN
Status: DISCONTINUED | OUTPATIENT
Start: 2023-01-01 | End: 2023-01-01 | Stop reason: HOSPADM

## 2023-01-01 RX ORDER — ONDANSETRON 2 MG/ML
INJECTION INTRAMUSCULAR; INTRAVENOUS PRN
Status: COMPLETED | OUTPATIENT
Start: 2023-01-01 | End: 2023-01-01

## 2023-01-01 RX ORDER — DIPHENHYDRAMINE HYDROCHLORIDE 50 MG/ML
25 INJECTION INTRAMUSCULAR; INTRAVENOUS
Status: COMPLETED | OUTPATIENT
Start: 2023-01-01 | End: 2023-01-01

## 2023-01-01 RX ORDER — BISACODYL 5 MG/1
5 TABLET, DELAYED RELEASE ORAL
COMMUNITY
End: 2023-01-01

## 2023-01-01 RX ORDER — ACETYLCYSTEINE 100 MG/ML
4 SOLUTION ORAL; RESPIRATORY (INHALATION) 3 TIMES DAILY
Qty: 90 ML | Refills: 0 | Status: SHIPPED | OUTPATIENT
Start: 2023-01-01 | End: 2023-01-01

## 2023-01-01 RX ORDER — POSACONAZOLE 100 MG/1
300 TABLET, DELAYED RELEASE ORAL DAILY
Status: DISCONTINUED | OUTPATIENT
Start: 2023-01-01 | End: 2023-01-01 | Stop reason: HOSPADM

## 2023-01-01 RX ORDER — ACETYLCYSTEINE 200 MG/ML
2 SOLUTION ORAL; RESPIRATORY (INHALATION) 3 TIMES DAILY
Status: DISCONTINUED | OUTPATIENT
Start: 2023-01-01 | End: 2023-01-01

## 2023-01-01 RX ORDER — ONDANSETRON 4 MG/1
4 TABLET, ORALLY DISINTEGRATING ORAL EVERY 30 MIN PRN
Status: DISCONTINUED | OUTPATIENT
Start: 2023-01-01 | End: 2023-01-01 | Stop reason: HOSPADM

## 2023-01-01 RX ORDER — MIDODRINE HYDROCHLORIDE 2.5 MG/1
2.5 TABLET ORAL
Status: DISCONTINUED | OUTPATIENT
Start: 2023-01-01 | End: 2023-01-01

## 2023-01-01 RX ORDER — FENTANYL CITRATE 50 UG/ML
INJECTION, SOLUTION INTRAMUSCULAR; INTRAVENOUS PRN
Status: COMPLETED | OUTPATIENT
Start: 2023-01-01 | End: 2023-01-01

## 2023-01-01 RX ORDER — BENZONATATE 100 MG/1
100 CAPSULE ORAL 3 TIMES DAILY PRN
Status: DISCONTINUED | OUTPATIENT
Start: 2023-01-01 | End: 2023-01-01

## 2023-01-01 RX ORDER — OXYCODONE AND ACETAMINOPHEN 5; 325 MG/1; MG/1
TABLET ORAL
COMMUNITY
Start: 2022-10-19 | End: 2023-01-01

## 2023-01-01 RX ORDER — DEXAMETHASONE SODIUM PHOSPHATE 4 MG/ML
INJECTION, SOLUTION INTRA-ARTICULAR; INTRALESIONAL; INTRAMUSCULAR; INTRAVENOUS; SOFT TISSUE PRN
Status: DISCONTINUED | OUTPATIENT
Start: 2023-01-01 | End: 2023-01-01

## 2023-01-01 RX ORDER — TRIAMCINOLONE ACETONIDE 1 MG/G
CREAM TOPICAL 2 TIMES DAILY
Status: DISCONTINUED | OUTPATIENT
Start: 2023-01-01 | End: 2023-01-01 | Stop reason: HOSPADM

## 2023-01-01 RX ORDER — ACETAMINOPHEN 325 MG/1
650 TABLET ORAL ONCE
Status: CANCELLED
Start: 2023-01-01

## 2023-01-01 RX ORDER — PREDNISONE 10 MG/1
10 TABLET ORAL EVERY MORNING
Status: DISCONTINUED | OUTPATIENT
Start: 2023-01-01 | End: 2023-01-01 | Stop reason: HOSPADM

## 2023-01-01 RX ORDER — HYDROXYZINE HYDROCHLORIDE 25 MG/1
25 TABLET, FILM COATED ORAL EVERY 6 HOURS PRN
Status: DISCONTINUED | OUTPATIENT
Start: 2023-01-01 | End: 2023-01-01 | Stop reason: HOSPADM

## 2023-01-01 RX ORDER — LORAZEPAM 2 MG/ML
0.5 INJECTION INTRAMUSCULAR EVERY 4 HOURS PRN
Status: CANCELLED | OUTPATIENT
Start: 2023-01-01

## 2023-01-01 RX ORDER — VITAMIN B COMPLEX
50 TABLET ORAL DAILY
Status: DISCONTINUED | OUTPATIENT
Start: 2023-01-01 | End: 2023-01-01 | Stop reason: HOSPADM

## 2023-01-01 RX ORDER — LEVOFLOXACIN 5 MG/ML
750 INJECTION, SOLUTION INTRAVENOUS ONCE
Status: DISCONTINUED | OUTPATIENT
Start: 2023-01-01 | End: 2023-01-01

## 2023-01-01 RX ORDER — LIDOCAINE HYDROCHLORIDE 10 MG/ML
1-30 INJECTION, SOLUTION EPIDURAL; INFILTRATION; INTRACAUDAL; PERINEURAL
Status: COMPLETED | OUTPATIENT
Start: 2023-01-01 | End: 2023-01-01

## 2023-01-01 RX ORDER — VIT B COMP NO.3/FOLIC/C/BIOTIN 1 MG-60 MG
1 TABLET ORAL DAILY
Status: DISCONTINUED | OUTPATIENT
Start: 2023-01-01 | End: 2023-01-01 | Stop reason: HOSPADM

## 2023-01-01 RX ORDER — AZATHIOPRINE 50 MG/1
50 TABLET ORAL DAILY
COMMUNITY
Start: 2023-01-01 | End: 2024-01-01

## 2023-01-01 RX ORDER — TACROLIMUS 0.5 MG/1
0.5 CAPSULE ORAL EVERY MORNING
Qty: 90 CAPSULE | Refills: 3 | Status: ON HOLD | OUTPATIENT
Start: 2023-01-01 | End: 2023-01-01

## 2023-01-01 RX ORDER — DIPHENHYDRAMINE HCL 50 MG
50 CAPSULE ORAL ONCE
Status: CANCELLED
Start: 2023-01-01

## 2023-01-01 RX ORDER — IODIXANOL 320 MG/ML
100 INJECTION, SOLUTION INTRAVASCULAR ONCE
Status: COMPLETED | OUTPATIENT
Start: 2023-01-01 | End: 2023-01-01

## 2023-01-01 RX ORDER — HYDROXYZINE HYDROCHLORIDE 25 MG/1
1 TABLET, FILM COATED ORAL EVERY 4 HOURS
COMMUNITY
Start: 2023-04-15 | End: 2023-01-01

## 2023-01-01 RX ORDER — TACROLIMUS 0.5 MG/1
CAPSULE ORAL
Start: 2023-01-01 | End: 2024-01-01

## 2023-01-01 RX ORDER — AZITHROMYCIN 250 MG/1
250 TABLET, FILM COATED ORAL DAILY
Qty: 30 TABLET | Refills: 11 | Status: ON HOLD | OUTPATIENT
Start: 2023-01-01 | End: 2023-01-01

## 2023-01-01 RX ORDER — GADOBUTROL 604.72 MG/ML
6 INJECTION INTRAVENOUS ONCE
Status: COMPLETED | OUTPATIENT
Start: 2023-01-01 | End: 2023-01-01

## 2023-01-01 RX ORDER — AZATHIOPRINE 50 MG/1
50 TABLET ORAL DAILY
Qty: 30 TABLET | Refills: 0 | Status: SHIPPED | OUTPATIENT
Start: 2023-01-01 | End: 2023-01-01

## 2023-01-01 RX ORDER — AZATHIOPRINE 50 MG/1
50 TABLET ORAL DAILY
Status: DISCONTINUED | OUTPATIENT
Start: 2023-01-01 | End: 2023-01-01

## 2023-01-01 RX ORDER — REGADENOSON 0.08 MG/ML
0.4 INJECTION, SOLUTION INTRAVENOUS ONCE
Status: COMPLETED | OUTPATIENT
Start: 2023-01-01 | End: 2023-01-01

## 2023-01-01 RX ORDER — ONDANSETRON 2 MG/ML
4 INJECTION INTRAMUSCULAR; INTRAVENOUS EVERY 30 MIN PRN
Status: DISCONTINUED | OUTPATIENT
Start: 2023-01-01 | End: 2023-01-01 | Stop reason: HOSPADM

## 2023-01-01 RX ORDER — MEPERIDINE HYDROCHLORIDE 25 MG/ML
25 INJECTION INTRAMUSCULAR; INTRAVENOUS; SUBCUTANEOUS
Status: CANCELLED
Start: 2023-01-01

## 2023-01-01 RX ORDER — NALOXONE HYDROCHLORIDE 0.4 MG/ML
0.2 INJECTION, SOLUTION INTRAMUSCULAR; INTRAVENOUS; SUBCUTANEOUS
Status: DISCONTINUED | OUTPATIENT
Start: 2023-01-01 | End: 2023-01-01

## 2023-01-01 RX ORDER — AZATHIOPRINE 50 MG/1
TABLET ORAL
Qty: 90 TABLET | Refills: 3
Start: 2023-01-01 | End: 2023-01-01

## 2023-01-01 RX ORDER — MIDODRINE HYDROCHLORIDE 5 MG/1
10 TABLET ORAL
Status: DISCONTINUED | OUTPATIENT
Start: 2023-01-01 | End: 2023-01-01 | Stop reason: HOSPADM

## 2023-01-01 RX ORDER — AMOXICILLIN AND CLAVULANATE POTASSIUM 500; 125 MG/1; MG/1
1 TABLET, FILM COATED ORAL EVERY 24 HOURS
Qty: 2 TABLET | Refills: 0 | Status: SHIPPED | OUTPATIENT
Start: 2023-01-01 | End: 2023-01-01

## 2023-01-01 RX ORDER — AMOXICILLIN AND CLAVULANATE POTASSIUM 500; 125 MG/1; MG/1
1 TABLET, FILM COATED ORAL ONCE
Status: COMPLETED | OUTPATIENT
Start: 2023-01-01 | End: 2023-01-01

## 2023-01-01 RX ORDER — ALBUTEROL SULFATE 90 UG/1
2 AEROSOL, METERED RESPIRATORY (INHALATION) EVERY 5 MIN PRN
Status: DISCONTINUED | OUTPATIENT
Start: 2023-01-01 | End: 2023-01-01 | Stop reason: HOSPADM

## 2023-01-01 RX ORDER — HEPARIN SODIUM,PORCINE 10 UNIT/ML
5-20 VIAL (ML) INTRAVENOUS DAILY PRN
Status: CANCELLED | OUTPATIENT
Start: 2023-01-01

## 2023-01-01 RX ORDER — PREDNISONE 10 MG/1
TABLET ORAL
Qty: 11 TABLET | Refills: 0 | Status: SHIPPED | OUTPATIENT
Start: 2023-01-01 | End: 2023-01-01

## 2023-01-01 RX ORDER — TACROLIMUS 0.5 MG/1
0.5 CAPSULE ORAL
Status: DISCONTINUED | OUTPATIENT
Start: 2023-01-01 | End: 2023-01-01 | Stop reason: HOSPADM

## 2023-01-01 RX ORDER — AZITHROMYCIN 250 MG/1
250 TABLET, FILM COATED ORAL DAILY
Status: DISCONTINUED | OUTPATIENT
Start: 2023-01-01 | End: 2023-01-01 | Stop reason: HOSPADM

## 2023-01-01 RX ORDER — TRIAMCINOLONE ACETONIDE 1 MG/G
OINTMENT TOPICAL 2 TIMES DAILY
Status: DISCONTINUED | OUTPATIENT
Start: 2023-01-01 | End: 2023-01-01

## 2023-01-01 RX ORDER — METOPROLOL TARTRATE 1 MG/ML
5 INJECTION, SOLUTION INTRAVENOUS ONCE
Status: DISCONTINUED | OUTPATIENT
Start: 2023-01-01 | End: 2023-01-01

## 2023-01-01 RX ORDER — MAGNESIUM HYDROXIDE 1200 MG/15ML
LIQUID ORAL PRN
Status: COMPLETED | OUTPATIENT
Start: 2023-01-01 | End: 2023-01-01

## 2023-01-01 RX ORDER — SULFAMETHOXAZOLE AND TRIMETHOPRIM 400; 80 MG/1; MG/1
1 TABLET ORAL 2 TIMES DAILY
Qty: 28 TABLET | Refills: 0 | Status: SHIPPED | OUTPATIENT
Start: 2023-01-01 | End: 2023-01-01

## 2023-01-01 RX ORDER — MINOCYCLINE HYDROCHLORIDE 100 MG/1
100 TABLET ORAL 2 TIMES DAILY
Qty: 14 TABLET | Refills: 0 | Status: SHIPPED | OUTPATIENT
Start: 2023-01-01 | End: 2023-01-01

## 2023-01-01 RX ORDER — GUAIFENESIN 600 MG/1
1200 TABLET, EXTENDED RELEASE ORAL 2 TIMES DAILY
Qty: 120 TABLET | Refills: 3 | Status: ON HOLD | OUTPATIENT
Start: 2023-01-01 | End: 2024-01-01

## 2023-01-01 RX ORDER — METOPROLOL TARTRATE 25 MG/1
25 TABLET, FILM COATED ORAL 2 TIMES DAILY
Status: DISCONTINUED | OUTPATIENT
Start: 2023-01-01 | End: 2023-01-01 | Stop reason: HOSPADM

## 2023-01-01 RX ORDER — MEPERIDINE HYDROCHLORIDE 25 MG/ML
25 INJECTION INTRAMUSCULAR; INTRAVENOUS; SUBCUTANEOUS EVERY 30 MIN PRN
Status: CANCELLED | OUTPATIENT
Start: 2023-01-01

## 2023-01-01 RX ORDER — PROCHLORPERAZINE MALEATE 10 MG
10 TABLET ORAL EVERY 6 HOURS PRN
Status: DISCONTINUED | OUTPATIENT
Start: 2023-01-01 | End: 2023-01-01 | Stop reason: HOSPADM

## 2023-01-01 RX ORDER — ONDANSETRON 2 MG/ML
4 INJECTION INTRAMUSCULAR; INTRAVENOUS EVERY 6 HOURS PRN
Status: DISCONTINUED | OUTPATIENT
Start: 2023-01-01 | End: 2023-01-01 | Stop reason: HOSPADM

## 2023-01-01 RX ORDER — MIDODRINE HYDROCHLORIDE 10 MG/1
10 TABLET ORAL
Qty: 90 TABLET | Refills: 0 | Status: SHIPPED | OUTPATIENT
Start: 2023-01-01 | End: 2023-01-01

## 2023-01-01 RX ORDER — PREDNISONE 5 MG/1
5 TABLET ORAL EVERY MORNING
Status: DISCONTINUED | OUTPATIENT
Start: 2023-01-01 | End: 2023-01-01 | Stop reason: HOSPADM

## 2023-01-01 RX ORDER — ALBUMIN (HUMAN) 12.5 G/50ML
50 SOLUTION INTRAVENOUS
Status: DISCONTINUED | OUTPATIENT
Start: 2023-01-01 | End: 2023-01-01

## 2023-01-01 RX ORDER — ALBUTEROL SULFATE 0.83 MG/ML
2.5 SOLUTION RESPIRATORY (INHALATION) EVERY 12 HOURS
Start: 2023-01-01

## 2023-01-01 RX ORDER — ALBUTEROL SULFATE 0.83 MG/ML
2.5 SOLUTION RESPIRATORY (INHALATION)
Status: CANCELLED | OUTPATIENT
Start: 2023-01-01

## 2023-01-01 RX ORDER — LANOLIN ALCOHOL/MO/W.PET/CERES
3 CREAM (GRAM) TOPICAL
Status: DISCONTINUED | OUTPATIENT
Start: 2023-01-01 | End: 2023-01-01 | Stop reason: HOSPADM

## 2023-01-01 RX ORDER — AMINOPHYLLINE 25 MG/ML
50-100 INJECTION, SOLUTION INTRAVENOUS
Status: DISCONTINUED | OUTPATIENT
Start: 2023-01-01 | End: 2023-01-01 | Stop reason: HOSPADM

## 2023-01-01 RX ORDER — ALBUTEROL SULFATE 0.83 MG/ML
2.5 SOLUTION RESPIRATORY (INHALATION) EVERY 6 HOURS PRN
Status: DISCONTINUED | OUTPATIENT
Start: 2023-01-01 | End: 2023-01-01 | Stop reason: HOSPADM

## 2023-01-01 RX ORDER — GUAIFENESIN 200 MG/10ML
200 LIQUID ORAL EVERY 4 HOURS PRN
Status: DISCONTINUED | OUTPATIENT
Start: 2023-01-01 | End: 2023-01-01 | Stop reason: HOSPADM

## 2023-01-01 RX ORDER — HYDROMORPHONE HCL IN WATER/PF 6 MG/30 ML
0.2 PATIENT CONTROLLED ANALGESIA SYRINGE INTRAVENOUS EVERY 4 HOURS PRN
Status: DISCONTINUED | OUTPATIENT
Start: 2023-01-01 | End: 2023-01-01 | Stop reason: HOSPADM

## 2023-01-01 RX ORDER — ONDANSETRON 4 MG/1
4 TABLET, ORALLY DISINTEGRATING ORAL EVERY 6 HOURS PRN
Status: DISCONTINUED | OUTPATIENT
Start: 2023-01-01 | End: 2023-01-01 | Stop reason: HOSPADM

## 2023-01-01 RX ORDER — VALGANCICLOVIR 450 MG/1
450 TABLET, FILM COATED ORAL
Status: DISCONTINUED | OUTPATIENT
Start: 2023-01-01 | End: 2023-01-01 | Stop reason: HOSPADM

## 2023-01-01 RX ORDER — AZATHIOPRINE 50 MG/1
50 TABLET ORAL DAILY
Start: 2023-01-01 | End: 2023-01-01

## 2023-01-01 RX ORDER — TACROLIMUS 0.5 MG/1
0.5 CAPSULE ORAL EVERY MORNING
Status: DISCONTINUED | OUTPATIENT
Start: 2023-01-01 | End: 2023-01-01

## 2023-01-01 RX ORDER — HEPARIN SODIUM 5000 [USP'U]/.5ML
5000 INJECTION, SOLUTION INTRAVENOUS; SUBCUTANEOUS EVERY 12 HOURS
Status: DISCONTINUED | OUTPATIENT
Start: 2023-01-01 | End: 2023-01-01 | Stop reason: HOSPADM

## 2023-01-01 RX ORDER — HEPARIN SODIUM 200 [USP'U]/100ML
1 INJECTION, SOLUTION INTRAVENOUS CONTINUOUS PRN
Status: DISCONTINUED | OUTPATIENT
Start: 2023-01-01 | End: 2023-01-01 | Stop reason: HOSPADM

## 2023-01-01 RX ORDER — ACETYLCYSTEINE 200 MG/ML
2 SOLUTION ORAL; RESPIRATORY (INHALATION) 4 TIMES DAILY
Status: DISCONTINUED | OUTPATIENT
Start: 2023-01-01 | End: 2023-01-01 | Stop reason: HOSPADM

## 2023-01-01 RX ORDER — VALGANCICLOVIR 450 MG/1
450 TABLET, FILM COATED ORAL
Qty: 12 TABLET | Refills: 1 | Status: ON HOLD | OUTPATIENT
Start: 2023-01-01 | End: 2023-01-01

## 2023-01-01 RX ORDER — ACETAMINOPHEN 325 MG/1
325 TABLET ORAL EVERY 4 HOURS PRN
Status: DISCONTINUED | OUTPATIENT
Start: 2023-01-01 | End: 2023-01-01 | Stop reason: HOSPADM

## 2023-01-01 RX ORDER — HYDROMORPHONE HCL IN WATER/PF 6 MG/30 ML
0.2 PATIENT CONTROLLED ANALGESIA SYRINGE INTRAVENOUS ONCE
Status: DISCONTINUED | OUTPATIENT
Start: 2023-01-01 | End: 2023-01-01 | Stop reason: HOSPADM

## 2023-01-01 RX ORDER — LIDOCAINE 40 MG/G
CREAM TOPICAL
Status: COMPLETED | OUTPATIENT
Start: 2023-01-01 | End: 2023-01-01

## 2023-01-01 RX ORDER — OXYCODONE HYDROCHLORIDE 10 MG/1
10 TABLET ORAL
Status: DISCONTINUED | OUTPATIENT
Start: 2023-01-01 | End: 2023-01-01 | Stop reason: HOSPADM

## 2023-01-01 RX ORDER — AMOXICILLIN AND CLAVULANATE POTASSIUM 500; 125 MG/1; MG/1
1 TABLET, FILM COATED ORAL EVERY 24 HOURS
Status: DISCONTINUED | OUTPATIENT
Start: 2023-01-01 | End: 2023-01-01 | Stop reason: HOSPADM

## 2023-01-01 RX ORDER — ACETYLCYSTEINE 100 MG/ML
4 SOLUTION ORAL; RESPIRATORY (INHALATION) 3 TIMES DAILY
Start: 2023-01-01 | End: 2023-01-01

## 2023-01-01 RX ORDER — HYDROMORPHONE HCL IN WATER/PF 6 MG/30 ML
0.4 PATIENT CONTROLLED ANALGESIA SYRINGE INTRAVENOUS EVERY 5 MIN PRN
Status: DISCONTINUED | OUTPATIENT
Start: 2023-01-01 | End: 2023-01-01 | Stop reason: HOSPADM

## 2023-01-01 RX ORDER — HYDROCORTISONE 2.5 %
CREAM (GRAM) TOPICAL 2 TIMES DAILY
Status: DISCONTINUED | OUTPATIENT
Start: 2023-01-01 | End: 2023-01-01

## 2023-01-01 RX ORDER — MONTELUKAST SODIUM 10 MG/1
10 TABLET ORAL AT BEDTIME
Status: DISCONTINUED | OUTPATIENT
Start: 2023-01-01 | End: 2023-01-01 | Stop reason: HOSPADM

## 2023-01-01 RX ORDER — VALGANCICLOVIR 450 MG/1
450 TABLET, FILM COATED ORAL
COMMUNITY
Start: 2023-01-01 | End: 2023-01-01

## 2023-01-01 RX ORDER — LIDOCAINE HYDROCHLORIDE 20 MG/ML
INJECTION, SOLUTION INFILTRATION; PERINEURAL PRN
Status: DISCONTINUED | OUTPATIENT
Start: 2023-01-01 | End: 2023-01-01

## 2023-01-01 RX ORDER — PREDNISONE 5 MG/1
5 TABLET ORAL EVERY MORNING
Status: DISCONTINUED | OUTPATIENT
Start: 2023-01-01 | End: 2023-01-01

## 2023-01-01 RX ORDER — AMOXICILLIN AND CLAVULANATE POTASSIUM 500; 125 MG/1; MG/1
TABLET, FILM COATED ORAL
Qty: 20 TABLET | Refills: 1 | Status: SHIPPED | OUTPATIENT
Start: 2023-01-01 | End: 2023-01-01

## 2023-01-01 RX ORDER — ESMOLOL HYDROCHLORIDE 10 MG/ML
INJECTION INTRAVENOUS PRN
Status: DISCONTINUED | OUTPATIENT
Start: 2023-01-01 | End: 2023-01-01

## 2023-01-01 RX ORDER — LEVALBUTEROL INHALATION SOLUTION 1.25 MG/3ML
1.25 SOLUTION RESPIRATORY (INHALATION) 4 TIMES DAILY
Status: DISCONTINUED | OUTPATIENT
Start: 2023-01-01 | End: 2023-01-01 | Stop reason: HOSPADM

## 2023-01-01 RX ORDER — ACETYLCYSTEINE 100 MG/ML
4 SOLUTION ORAL; RESPIRATORY (INHALATION) 2 TIMES DAILY
Qty: 240 ML | Refills: 1 | Status: SHIPPED | OUTPATIENT
Start: 2023-01-01 | End: 2023-01-01

## 2023-01-01 RX ORDER — LANOLIN ALCOHOL/MO/W.PET/CERES
CREAM (GRAM) TOPICAL 2 TIMES DAILY
COMMUNITY
Start: 2023-03-10 | End: 2023-01-01

## 2023-01-01 RX ORDER — AMOXICILLIN AND CLAVULANATE POTASSIUM 500; 125 MG/1; MG/1
1 TABLET, FILM COATED ORAL
Status: DISCONTINUED | OUTPATIENT
Start: 2023-01-01 | End: 2023-01-01

## 2023-01-01 RX ORDER — ONDANSETRON 2 MG/ML
4 INJECTION INTRAMUSCULAR; INTRAVENOUS ONCE
Status: COMPLETED | OUTPATIENT
Start: 2023-01-01 | End: 2023-01-01

## 2023-01-01 RX ORDER — LIDOCAINE HYDROCHLORIDE 40 MG/ML
INJECTION, SOLUTION RETROBULBAR PRN
Status: COMPLETED | OUTPATIENT
Start: 2023-01-01 | End: 2023-01-01

## 2023-01-01 RX ORDER — HYDROCORTISONE 25 MG/G
OINTMENT TOPICAL 2 TIMES DAILY
Status: DISCONTINUED | OUTPATIENT
Start: 2023-01-01 | End: 2023-01-01

## 2023-01-01 RX ORDER — PROPOFOL 10 MG/ML
INJECTION, EMULSION INTRAVENOUS PRN
Status: DISCONTINUED | OUTPATIENT
Start: 2023-01-01 | End: 2023-01-01

## 2023-01-01 RX ORDER — PIPERACILLIN SODIUM, TAZOBACTAM SODIUM 2; .25 G/10ML; G/10ML
2.25 INJECTION, POWDER, LYOPHILIZED, FOR SOLUTION INTRAVENOUS EVERY 6 HOURS
Status: DISCONTINUED | OUTPATIENT
Start: 2023-01-01 | End: 2023-01-01

## 2023-01-01 RX ORDER — AZITHROMYCIN 250 MG/1
250 TABLET, FILM COATED ORAL DAILY
Status: ON HOLD | COMMUNITY
End: 2023-01-01

## 2023-01-01 RX ORDER — FENTANYL CITRATE 50 UG/ML
50 INJECTION, SOLUTION INTRAMUSCULAR; INTRAVENOUS EVERY 5 MIN PRN
Status: DISCONTINUED | OUTPATIENT
Start: 2023-01-01 | End: 2023-01-01 | Stop reason: HOSPADM

## 2023-01-01 RX ORDER — PIPERACILLIN SODIUM, TAZOBACTAM SODIUM 2; .25 G/10ML; G/10ML
2.25 INJECTION, POWDER, LYOPHILIZED, FOR SOLUTION INTRAVENOUS EVERY 6 HOURS
Status: COMPLETED | OUTPATIENT
Start: 2023-01-01 | End: 2023-01-01

## 2023-01-01 RX ORDER — TBO-FILGRASTIM 300 UG/.5ML
300 INJECTION, SOLUTION SUBCUTANEOUS ONCE
Qty: 0.5 ML | Refills: 0 | Status: SHIPPED | OUTPATIENT
Start: 2023-01-01 | End: 2023-01-01

## 2023-01-01 RX ORDER — MIDODRINE HYDROCHLORIDE 5 MG/1
10 TABLET ORAL DAILY PRN
Status: DISCONTINUED | OUTPATIENT
Start: 2023-01-01 | End: 2023-01-01

## 2023-01-01 RX ORDER — LEVALBUTEROL INHALATION SOLUTION 1.25 MG/3ML
1.25 SOLUTION RESPIRATORY (INHALATION) 3 TIMES DAILY
Qty: 270 ML | Refills: 0 | Status: SHIPPED | OUTPATIENT
Start: 2023-01-01 | End: 2023-01-01

## 2023-01-01 RX ORDER — ACYCLOVIR 200 MG/1
0-1 CAPSULE ORAL
Status: DISCONTINUED | OUTPATIENT
Start: 2023-01-01 | End: 2023-01-01 | Stop reason: HOSPADM

## 2023-01-01 RX ORDER — CALCIUM ACETATE 667 MG/1
667 CAPSULE ORAL
Status: DISCONTINUED | OUTPATIENT
Start: 2023-01-01 | End: 2023-01-01 | Stop reason: HOSPADM

## 2023-01-01 RX ORDER — TACROLIMUS 1 MG/1
1 CAPSULE ORAL 2 TIMES DAILY
Qty: 60 CAPSULE | Refills: 11 | Status: SHIPPED | OUTPATIENT
Start: 2023-01-01 | End: 2023-01-01

## 2023-01-01 RX ORDER — OXYCODONE HYDROCHLORIDE 5 MG/1
5 TABLET ORAL
Status: DISCONTINUED | OUTPATIENT
Start: 2023-01-01 | End: 2023-01-01 | Stop reason: HOSPADM

## 2023-01-01 RX ORDER — MAGNESIUM CHLORIDE 71.5 G/G
2 TABLET ORAL
Qty: 90 TABLET | Refills: 3
Start: 2023-01-01

## 2023-01-01 RX ORDER — PREDNISONE 5 MG/1
5 TABLET ORAL EVERY MORNING
Start: 2023-01-01 | End: 2023-01-01

## 2023-01-01 RX ORDER — IOPAMIDOL 755 MG/ML
67 INJECTION, SOLUTION INTRAVASCULAR ONCE
Status: COMPLETED | OUTPATIENT
Start: 2023-01-01 | End: 2023-01-01

## 2023-01-01 RX ORDER — ACETYLCYSTEINE 100 MG/ML
4 SOLUTION ORAL; RESPIRATORY (INHALATION) 2 TIMES DAILY PRN
Status: ON HOLD
Start: 2023-01-01 | End: 2023-01-01

## 2023-01-01 RX ORDER — SODIUM CHLORIDE, SODIUM LACTATE, POTASSIUM CHLORIDE, CALCIUM CHLORIDE 600; 310; 30; 20 MG/100ML; MG/100ML; MG/100ML; MG/100ML
INJECTION, SOLUTION INTRAVENOUS CONTINUOUS PRN
Status: DISCONTINUED | OUTPATIENT
Start: 2023-01-01 | End: 2023-01-01

## 2023-01-01 RX ORDER — LIDOCAINE 40 MG/G
CREAM TOPICAL
Status: CANCELLED | OUTPATIENT
Start: 2023-01-01

## 2023-01-01 RX ORDER — FENTANYL CITRATE 50 UG/ML
INJECTION, SOLUTION INTRAMUSCULAR; INTRAVENOUS PRN
Status: DISCONTINUED | OUTPATIENT
Start: 2023-01-01 | End: 2023-01-01 | Stop reason: HOSPADM

## 2023-01-01 RX ORDER — FENTANYL CITRATE 50 UG/ML
INJECTION, SOLUTION INTRAMUSCULAR; INTRAVENOUS PRN
Status: DISCONTINUED | OUTPATIENT
Start: 2023-01-01 | End: 2023-01-01

## 2023-01-01 RX ORDER — VALGANCICLOVIR 450 MG/1
450 TABLET, FILM COATED ORAL
Qty: 30 TABLET | Refills: 0 | Status: SHIPPED | OUTPATIENT
Start: 2023-01-01 | End: 2023-01-01

## 2023-01-01 RX ORDER — IOPAMIDOL 755 MG/ML
53 INJECTION, SOLUTION INTRAVASCULAR ONCE
Status: COMPLETED | OUTPATIENT
Start: 2023-01-01 | End: 2023-01-01

## 2023-01-01 RX ORDER — LIDOCAINE HYDROCHLORIDE 10 MG/ML
INJECTION, SOLUTION INFILTRATION; PERINEURAL PRN
Status: DISCONTINUED | OUTPATIENT
Start: 2023-01-01 | End: 2023-01-01 | Stop reason: HOSPADM

## 2023-01-01 RX ORDER — SODIUM CHLORIDE, SODIUM LACTATE, POTASSIUM CHLORIDE, CALCIUM CHLORIDE 600; 310; 30; 20 MG/100ML; MG/100ML; MG/100ML; MG/100ML
INJECTION, SOLUTION INTRAVENOUS CONTINUOUS
Status: CANCELLED | OUTPATIENT
Start: 2023-01-01

## 2023-01-01 RX ORDER — AZITHROMYCIN 250 MG/1
250 TABLET, FILM COATED ORAL DAILY
Start: 2023-01-01

## 2023-01-01 RX ORDER — MAGNESIUM OXIDE 400 MG/1
1 TABLET ORAL
COMMUNITY
Start: 2022-10-12 | End: 2023-01-01

## 2023-01-01 RX ORDER — MAGNESIUM SULFATE HEPTAHYDRATE 40 MG/ML
2 INJECTION, SOLUTION INTRAVENOUS ONCE
Status: COMPLETED | OUTPATIENT
Start: 2023-01-01 | End: 2023-01-01

## 2023-01-01 RX ORDER — FENTANYL CITRATE 50 UG/ML
25 INJECTION, SOLUTION INTRAMUSCULAR; INTRAVENOUS EVERY 5 MIN PRN
Status: DISCONTINUED | OUTPATIENT
Start: 2023-01-01 | End: 2023-01-01 | Stop reason: HOSPADM

## 2023-01-01 RX ORDER — PREDNISONE 5 MG/1
TABLET ORAL
Qty: 32 TABLET | Refills: 0 | Status: SHIPPED | OUTPATIENT
Start: 2023-01-01 | End: 2023-01-01

## 2023-01-01 RX ORDER — DIPHENHYDRAMINE HYDROCHLORIDE 50 MG/ML
50 INJECTION INTRAMUSCULAR; INTRAVENOUS ONCE
Status: COMPLETED | OUTPATIENT
Start: 2023-01-01 | End: 2023-01-01

## 2023-01-01 RX ORDER — TACROLIMUS 1 MG/1
1 CAPSULE ORAL
Status: DISCONTINUED | OUTPATIENT
Start: 2023-01-01 | End: 2023-01-01

## 2023-01-01 RX ORDER — FLUTICASONE PROPIONATE AND SALMETEROL 250; 50 UG/1; UG/1
1 POWDER RESPIRATORY (INHALATION) EVERY 12 HOURS
Qty: 60 EACH | Refills: 11 | Status: SHIPPED | OUTPATIENT
Start: 2023-01-01

## 2023-01-01 RX ORDER — ACETAMINOPHEN 325 MG/1
650 TABLET ORAL ONCE
Status: COMPLETED | OUTPATIENT
Start: 2023-01-01 | End: 2023-01-01

## 2023-01-01 RX ORDER — LIDOCAINE 40 MG/G
CREAM TOPICAL
Status: DISCONTINUED | OUTPATIENT
Start: 2023-01-01 | End: 2023-01-01 | Stop reason: HOSPADM

## 2023-01-01 RX ORDER — DIPHENHYDRAMINE HCL 25 MG
25 CAPSULE ORAL ONCE
Status: COMPLETED | OUTPATIENT
Start: 2023-01-01 | End: 2023-01-01

## 2023-01-01 RX ORDER — EPINEPHRINE 1 MG/ML
INJECTION, SOLUTION, CONCENTRATE INTRAVENOUS PRN
Status: DISCONTINUED | OUTPATIENT
Start: 2023-01-01 | End: 2023-01-01 | Stop reason: HOSPADM

## 2023-01-01 RX ORDER — LEVALBUTEROL INHALATION SOLUTION 1.25 MG/3ML
1.25 SOLUTION RESPIRATORY (INHALATION)
Status: DISCONTINUED | OUTPATIENT
Start: 2023-01-01 | End: 2023-01-01

## 2023-01-01 RX ORDER — TACROLIMUS 1 MG/1
1 CAPSULE ORAL EVERY EVENING
Qty: 90 CAPSULE | Refills: 3 | Status: ON HOLD | OUTPATIENT
Start: 2023-01-01 | End: 2023-01-01

## 2023-01-01 RX ORDER — VALGANCICLOVIR 450 MG/1
450 TABLET, FILM COATED ORAL DAILY
Status: DISCONTINUED | OUTPATIENT
Start: 2023-01-01 | End: 2023-01-01

## 2023-01-01 RX ORDER — PROPOFOL 10 MG/ML
INJECTION, EMULSION INTRAVENOUS CONTINUOUS PRN
Status: DISCONTINUED | OUTPATIENT
Start: 2023-01-01 | End: 2023-01-01

## 2023-01-01 RX ORDER — AZATHIOPRINE 50 MG/1
75 TABLET ORAL DAILY
Qty: 135 TABLET | Refills: 3 | Status: ON HOLD | OUTPATIENT
Start: 2023-01-01 | End: 2023-01-01

## 2023-01-01 RX ORDER — TACROLIMUS 1 MG/1
1 CAPSULE ORAL EVERY EVENING
COMMUNITY
Start: 2023-01-01 | End: 2023-01-01

## 2023-01-01 RX ORDER — LIDOCAINE HYDROCHLORIDE 40 MG/ML
INJECTION, SOLUTION RETROBULBAR PRN
Status: DISCONTINUED | OUTPATIENT
Start: 2023-01-01 | End: 2023-01-01 | Stop reason: HOSPADM

## 2023-01-01 RX ORDER — AZATHIOPRINE 50 MG/1
50 TABLET ORAL EVERY EVENING
Status: DISCONTINUED | OUTPATIENT
Start: 2023-01-01 | End: 2023-01-01 | Stop reason: HOSPADM

## 2023-01-01 RX ORDER — VALGANCICLOVIR 450 MG/1
450 TABLET, FILM COATED ORAL
Qty: 10 TABLET | Refills: 0 | Status: SHIPPED | OUTPATIENT
Start: 2023-01-01 | End: 2023-01-01

## 2023-01-01 RX ORDER — ALBUTEROL SULFATE 0.83 MG/ML
2.5 SOLUTION RESPIRATORY (INHALATION) EVERY 6 HOURS PRN
Qty: 360 ML | Refills: 4 | Status: SHIPPED | OUTPATIENT
Start: 2023-01-01 | End: 2023-01-01

## 2023-01-01 RX ORDER — PIPERACILLIN SODIUM, TAZOBACTAM SODIUM 2; .25 G/10ML; G/10ML
2.25 INJECTION, POWDER, LYOPHILIZED, FOR SOLUTION INTRAVENOUS ONCE
Qty: 10 ML | Refills: 0 | Status: COMPLETED | OUTPATIENT
Start: 2023-01-01 | End: 2023-01-01

## 2023-01-01 RX ORDER — SULFAMETHOXAZOLE AND TRIMETHOPRIM 400; 80 MG/1; MG/1
1 TABLET ORAL
Status: DISCONTINUED | OUTPATIENT
Start: 2023-01-01 | End: 2023-01-01 | Stop reason: HOSPADM

## 2023-01-01 RX ORDER — BENZONATATE 100 MG/1
100 CAPSULE ORAL EVERY 4 HOURS PRN
Status: DISCONTINUED | OUTPATIENT
Start: 2023-01-01 | End: 2023-01-01 | Stop reason: HOSPADM

## 2023-01-01 RX ORDER — NALOXONE HYDROCHLORIDE 0.4 MG/ML
0.2 INJECTION, SOLUTION INTRAMUSCULAR; INTRAVENOUS; SUBCUTANEOUS
Status: DISCONTINUED | OUTPATIENT
Start: 2023-01-01 | End: 2023-01-01 | Stop reason: HOSPADM

## 2023-01-01 RX ORDER — HYDROCORTISONE 25 MG/G
OINTMENT TOPICAL 2 TIMES DAILY
Status: DISCONTINUED | OUTPATIENT
Start: 2023-01-01 | End: 2023-01-01 | Stop reason: HOSPADM

## 2023-01-01 RX ORDER — TACROLIMUS 1 MG/1
1 CAPSULE ORAL EVERY EVENING
Start: 2023-01-01 | End: 2023-01-01

## 2023-01-01 RX ORDER — FENTANYL CITRATE 50 UG/ML
25-50 INJECTION, SOLUTION INTRAMUSCULAR; INTRAVENOUS EVERY 5 MIN PRN
Status: DISCONTINUED | OUTPATIENT
Start: 2023-01-01 | End: 2023-01-01 | Stop reason: HOSPADM

## 2023-01-01 RX ORDER — MONTELUKAST SODIUM 10 MG/1
10 TABLET ORAL AT BEDTIME
Qty: 30 TABLET | Refills: 11 | Status: SHIPPED | OUTPATIENT
Start: 2023-01-01

## 2023-01-01 RX ORDER — VALGANCICLOVIR 450 MG/1
450 TABLET, FILM COATED ORAL ONCE
Qty: 1 TABLET | Refills: 0 | Status: COMPLETED | OUTPATIENT
Start: 2023-01-01 | End: 2023-01-01

## 2023-01-01 RX ORDER — PREDNISONE 5 MG/1
TABLET ORAL
Qty: 12 TABLET | Refills: 0 | Status: SHIPPED | OUTPATIENT
Start: 2023-01-01 | End: 2023-01-01

## 2023-01-01 RX ORDER — BUDESONIDE 0.5 MG/2ML
0.5 INHALANT ORAL DAILY PRN
Status: DISCONTINUED | OUTPATIENT
Start: 2023-01-01 | End: 2023-01-01 | Stop reason: HOSPADM

## 2023-01-01 RX ORDER — CAFFEINE CITRATE 20 MG/ML
60 SOLUTION INTRAVENOUS
Status: DISCONTINUED | OUTPATIENT
Start: 2023-01-01 | End: 2023-01-01 | Stop reason: HOSPADM

## 2023-01-01 RX ORDER — MIDODRINE HYDROCHLORIDE 5 MG/1
1 TABLET ORAL
COMMUNITY
Start: 2023-04-24 | End: 2023-01-01

## 2023-01-01 RX ORDER — ONDANSETRON 2 MG/ML
4 INJECTION INTRAMUSCULAR; INTRAVENOUS ONCE
Status: DISCONTINUED | OUTPATIENT
Start: 2023-01-01 | End: 2023-01-01 | Stop reason: HOSPADM

## 2023-01-01 RX ORDER — TACROLIMUS 0.5 MG/1
0.5 CAPSULE ORAL EVERY MORNING
Start: 2023-01-01 | End: 2023-01-01

## 2023-01-01 RX ORDER — BUDESONIDE 0.5 MG/2ML
0.5 INHALANT ORAL DAILY PRN
Start: 2023-01-01 | End: 2023-01-01

## 2023-01-01 RX ORDER — PREDNISONE 5 MG/1
5 TABLET ORAL DAILY
Qty: 30 TABLET | Refills: 11 | Status: SHIPPED | OUTPATIENT
Start: 2023-01-01

## 2023-01-01 RX ORDER — DIPHENHYDRAMINE HCL 25 MG
50 CAPSULE ORAL ONCE
Status: COMPLETED | OUTPATIENT
Start: 2023-01-01 | End: 2023-01-01

## 2023-01-01 RX ORDER — BUDESONIDE 0.5 MG/2ML
0.5 INHALANT ORAL DAILY PRN
Qty: 60 ML | Refills: 0 | Status: SHIPPED | OUTPATIENT
Start: 2023-01-01 | End: 2023-01-01

## 2023-01-01 RX ORDER — LIDOCAINE HYDROCHLORIDE 10 MG/ML
INJECTION, SOLUTION INFILTRATION; PERINEURAL PRN
Status: COMPLETED | OUTPATIENT
Start: 2023-01-01 | End: 2023-01-01

## 2023-01-01 RX ORDER — SULFAMETHOXAZOLE AND TRIMETHOPRIM 400; 80 MG/1; MG/1
1 TABLET ORAL
Start: 2023-01-01 | End: 2023-01-01

## 2023-01-01 RX ORDER — FLUTICASONE FUROATE AND VILANTEROL 100; 25 UG/1; UG/1
1 POWDER RESPIRATORY (INHALATION) DAILY
Status: DISCONTINUED | OUTPATIENT
Start: 2023-01-01 | End: 2023-01-01 | Stop reason: HOSPADM

## 2023-01-01 RX ADMIN — LEVALBUTEROL HYDROCHLORIDE 1.25 MG: 1.25 SOLUTION RESPIRATORY (INHALATION) at 20:33

## 2023-01-01 RX ADMIN — BENZONATATE 100 MG: 100 CAPSULE ORAL at 14:46

## 2023-01-01 RX ADMIN — Medication 50 MCG: at 11:05

## 2023-01-01 RX ADMIN — SODIUM CHLORIDE 300 ML: 9 INJECTION, SOLUTION INTRAVENOUS at 07:50

## 2023-01-01 RX ADMIN — SODIUM CHLORIDE, POTASSIUM CHLORIDE, SODIUM LACTATE AND CALCIUM CHLORIDE 1000 ML: 600; 310; 30; 20 INJECTION, SOLUTION INTRAVENOUS at 00:57

## 2023-01-01 RX ADMIN — MIDODRINE HYDROCHLORIDE 10 MG: 5 TABLET ORAL at 12:13

## 2023-01-01 RX ADMIN — AZITHROMYCIN 250 MG: 250 TABLET, FILM COATED ORAL at 14:46

## 2023-01-01 RX ADMIN — PIPERACILLIN AND TAZOBACTAM 2.25 G: 2; .25 INJECTION, POWDER, FOR SOLUTION INTRAVENOUS at 06:26

## 2023-01-01 RX ADMIN — FLUTICASONE FUROATE AND VILANTEROL TRIFENATATE 1 PUFF: 100; 25 POWDER RESPIRATORY (INHALATION) at 08:44

## 2023-01-01 RX ADMIN — TACROLIMUS 1 MG: 1 CAPSULE ORAL at 18:09

## 2023-01-01 RX ADMIN — ACETYLCYSTEINE 2 ML: 200 SOLUTION ORAL; RESPIRATORY (INHALATION) at 20:22

## 2023-01-01 RX ADMIN — BENZONATATE 100 MG: 100 CAPSULE ORAL at 20:53

## 2023-01-01 RX ADMIN — ALBUMIN HUMAN 25 G: 0.05 INJECTION, SOLUTION INTRAVENOUS at 21:11

## 2023-01-01 RX ADMIN — PIPERACILLIN AND TAZOBACTAM 2.25 G: 2; .25 INJECTION, POWDER, FOR SOLUTION INTRAVENOUS at 18:26

## 2023-01-01 RX ADMIN — Medication 50 MCG: at 11:55

## 2023-01-01 RX ADMIN — Medication 50 MG: at 10:51

## 2023-01-01 RX ADMIN — ACETAMINOPHEN 325 MG: 325 TABLET, FILM COATED ORAL at 08:59

## 2023-01-01 RX ADMIN — HEPARIN SODIUM 5000 UNITS: 5000 INJECTION, SOLUTION INTRAVENOUS; SUBCUTANEOUS at 20:31

## 2023-01-01 RX ADMIN — PIPERACILLIN AND TAZOBACTAM 2.25 G: 2; .25 INJECTION, POWDER, FOR SOLUTION INTRAVENOUS at 00:54

## 2023-01-01 RX ADMIN — MIDAZOLAM 2 MG: 1 INJECTION INTRAMUSCULAR; INTRAVENOUS at 14:21

## 2023-01-01 RX ADMIN — BENZONATATE 100 MG: 100 CAPSULE ORAL at 08:59

## 2023-01-01 RX ADMIN — PREDNISONE 20 MG: 20 TABLET ORAL at 08:52

## 2023-01-01 RX ADMIN — HEPARIN SODIUM 5000 UNITS: 5000 INJECTION, SOLUTION INTRAVENOUS; SUBCUTANEOUS at 20:19

## 2023-01-01 RX ADMIN — PIPERACILLIN SODIUM AND TAZOBACTAM SODIUM 2.25 G: 2; .25 INJECTION, POWDER, LYOPHILIZED, FOR SOLUTION INTRAVENOUS at 15:11

## 2023-01-01 RX ADMIN — AZATHIOPRINE 50 MG: 50 TABLET ORAL at 21:15

## 2023-01-01 RX ADMIN — BENZONATATE 100 MG: 100 CAPSULE ORAL at 18:59

## 2023-01-01 RX ADMIN — BENZONATATE 100 MG: 100 CAPSULE ORAL at 22:11

## 2023-01-01 RX ADMIN — BENZONATATE 100 MG: 100 CAPSULE ORAL at 12:25

## 2023-01-01 RX ADMIN — BENZONATATE 100 MG: 100 CAPSULE ORAL at 21:46

## 2023-01-01 RX ADMIN — BENZONATATE 100 MG: 100 CAPSULE ORAL at 15:30

## 2023-01-01 RX ADMIN — PANTOPRAZOLE SODIUM 40 MG: 40 TABLET, DELAYED RELEASE ORAL at 09:40

## 2023-01-01 RX ADMIN — SODIUM CHLORIDE 250 ML: 9 INJECTION, SOLUTION INTRAVENOUS at 10:26

## 2023-01-01 RX ADMIN — TRIAMCINOLONE ACETONIDE: 1 CREAM TOPICAL at 10:12

## 2023-01-01 RX ADMIN — MIDODRINE HYDROCHLORIDE 10 MG: 5 TABLET ORAL at 19:49

## 2023-01-01 RX ADMIN — HYDROMORPHONE HYDROCHLORIDE 0.2 MG: 0.2 INJECTION, SOLUTION INTRAMUSCULAR; INTRAVENOUS; SUBCUTANEOUS at 17:15

## 2023-01-01 RX ADMIN — Medication 50 MCG: at 09:46

## 2023-01-01 RX ADMIN — MIDODRINE HYDROCHLORIDE 10 MG: 5 TABLET ORAL at 21:03

## 2023-01-01 RX ADMIN — AMOXICILLIN AND CLAVULANATE POTASSIUM 1 TABLET: 500; 125 TABLET, FILM COATED ORAL at 14:21

## 2023-01-01 RX ADMIN — TRIAMCINOLONE ACETONIDE: 1 OINTMENT TOPICAL at 20:45

## 2023-01-01 RX ADMIN — AZITHROMYCIN 250 MG: 250 TABLET, FILM COATED ORAL at 20:24

## 2023-01-01 RX ADMIN — HEPARIN SODIUM 5000 UNITS: 5000 INJECTION, SOLUTION INTRAVENOUS; SUBCUTANEOUS at 08:52

## 2023-01-01 RX ADMIN — MONTELUKAST 10 MG: 10 TABLET, FILM COATED ORAL at 23:41

## 2023-01-01 RX ADMIN — Medication 50 MCG: at 08:42

## 2023-01-01 RX ADMIN — SODIUM CHLORIDE 300 ML: 9 INJECTION, SOLUTION INTRAVENOUS at 15:24

## 2023-01-01 RX ADMIN — LEVALBUTEROL HYDROCHLORIDE 1.25 MG: 1.25 SOLUTION RESPIRATORY (INHALATION) at 13:02

## 2023-01-01 RX ADMIN — LIDOCAINE HYDROCHLORIDE 100 MG: 20 INJECTION, SOLUTION INFILTRATION; PERINEURAL at 14:33

## 2023-01-01 RX ADMIN — LIDOCAINE HYDROCHLORIDE 0.5 ML: 10 INJECTION, SOLUTION EPIDURAL; INFILTRATION; INTRACAUDAL; PERINEURAL at 12:36

## 2023-01-01 RX ADMIN — B-COMPLEX W/ C & FOLIC ACID TAB 1 MG 1 TABLET: 1 TAB at 07:01

## 2023-01-01 RX ADMIN — FILGRASTIM-AAFI 300 MCG: 300 INJECTION, SOLUTION INTRAVENOUS; SUBCUTANEOUS at 22:00

## 2023-01-01 RX ADMIN — LIDOCAINE HYDROCHLORIDE 0.5 ML: 10 INJECTION, SOLUTION EPIDURAL; INFILTRATION; INTRACAUDAL; PERINEURAL at 14:50

## 2023-01-01 RX ADMIN — PIPERACILLIN AND TAZOBACTAM 2.25 G: 2; .25 INJECTION, POWDER, FOR SOLUTION INTRAVENOUS at 05:17

## 2023-01-01 RX ADMIN — PANTOPRAZOLE SODIUM 40 MG: 40 TABLET, DELAYED RELEASE ORAL at 08:52

## 2023-01-01 RX ADMIN — TACROLIMUS 1 MG: 1 CAPSULE ORAL at 18:59

## 2023-01-01 RX ADMIN — POSACONAZOLE 300 MG: 100 TABLET, DELAYED RELEASE ORAL at 09:48

## 2023-01-01 RX ADMIN — AZITHROMYCIN 250 MG: 250 TABLET, FILM COATED ORAL at 09:48

## 2023-01-01 RX ADMIN — BENZONATATE 100 MG: 100 CAPSULE ORAL at 20:39

## 2023-01-01 RX ADMIN — HEPARIN SODIUM 5000 UNITS: 5000 INJECTION, SOLUTION INTRAVENOUS; SUBCUTANEOUS at 20:46

## 2023-01-01 RX ADMIN — ACETYLCYSTEINE 2 ML: 200 SOLUTION ORAL; RESPIRATORY (INHALATION) at 13:58

## 2023-01-01 RX ADMIN — HEPARIN SODIUM 5000 UNITS: 5000 INJECTION, SOLUTION INTRAVENOUS; SUBCUTANEOUS at 08:57

## 2023-01-01 RX ADMIN — AZATHIOPRINE 50 MG: 50 TABLET ORAL at 20:16

## 2023-01-01 RX ADMIN — TACROLIMUS 0.5 MG: 0.5 CAPSULE ORAL at 08:52

## 2023-01-01 RX ADMIN — IOPAMIDOL 53 ML: 755 INJECTION, SOLUTION INTRAVENOUS at 15:45

## 2023-01-01 RX ADMIN — ACETYLCYSTEINE 2 ML: 200 SOLUTION ORAL; RESPIRATORY (INHALATION) at 13:41

## 2023-01-01 RX ADMIN — TACROLIMUS 0.5 MG: 0.5 CAPSULE ORAL at 11:56

## 2023-01-01 RX ADMIN — ACETYLCYSTEINE 2 ML: 200 SOLUTION ORAL; RESPIRATORY (INHALATION) at 21:17

## 2023-01-01 RX ADMIN — B-COMPLEX W/ C & FOLIC ACID TAB 1 MG 1 TABLET: 1 TAB at 19:17

## 2023-01-01 RX ADMIN — ALBUMIN HUMAN 50 ML: 0.25 SOLUTION INTRAVENOUS at 12:36

## 2023-01-01 RX ADMIN — LEVALBUTEROL HYDROCHLORIDE 1.25 MG: 1.25 SOLUTION RESPIRATORY (INHALATION) at 17:22

## 2023-01-01 RX ADMIN — VALGANCICLOVIR HYDROCHLORIDE 450 MG: 450 TABLET ORAL at 19:15

## 2023-01-01 RX ADMIN — Medication 5 MG: at 15:06

## 2023-01-01 RX ADMIN — PIPERACILLIN AND TAZOBACTAM 2.25 G: 2; .25 INJECTION, POWDER, FOR SOLUTION INTRAVENOUS at 06:29

## 2023-01-01 RX ADMIN — PREDNISONE 15 MG: 5 TABLET ORAL at 11:56

## 2023-01-01 RX ADMIN — FLUTICASONE FUROATE AND VILANTEROL TRIFENATATE 1 PUFF: 100; 25 POWDER RESPIRATORY (INHALATION) at 19:12

## 2023-01-01 RX ADMIN — B-COMPLEX W/ C & FOLIC ACID TAB 1 MG 1 TABLET: 1 TAB at 12:46

## 2023-01-01 RX ADMIN — HEPARIN SODIUM 5000 UNITS: 5000 INJECTION, SOLUTION INTRAVENOUS; SUBCUTANEOUS at 21:14

## 2023-01-01 RX ADMIN — MIDODRINE HYDROCHLORIDE 10 MG: 5 TABLET ORAL at 07:03

## 2023-01-01 RX ADMIN — MONTELUKAST 10 MG: 10 TABLET, FILM COATED ORAL at 21:52

## 2023-01-01 RX ADMIN — CALCIUM ACETATE 667 MG: 667 CAPSULE ORAL at 09:47

## 2023-01-01 RX ADMIN — HEPARIN SODIUM 5000 UNITS: 5000 INJECTION, SOLUTION INTRAVENOUS; SUBCUTANEOUS at 20:16

## 2023-01-01 RX ADMIN — TACROLIMUS 0.5 MG: 0.5 CAPSULE ORAL at 19:18

## 2023-01-01 RX ADMIN — PREDNISONE 10 MG: 10 TABLET ORAL at 08:42

## 2023-01-01 RX ADMIN — PROPOFOL 150 MCG/KG/MIN: 10 INJECTION, EMULSION INTRAVENOUS at 14:33

## 2023-01-01 RX ADMIN — SODIUM CHLORIDE 250 ML: 9 INJECTION, SOLUTION INTRAVENOUS at 15:23

## 2023-01-01 RX ADMIN — DIPHENHYDRAMINE HYDROCHLORIDE 25 MG: 50 INJECTION, SOLUTION INTRAMUSCULAR; INTRAVENOUS at 09:40

## 2023-01-01 RX ADMIN — ALBUTEROL SULFATE 2.5 MG: 2.5 SOLUTION RESPIRATORY (INHALATION) at 21:58

## 2023-01-01 RX ADMIN — VALGANCICLOVIR HYDROCHLORIDE 450 MG: 450 TABLET ORAL at 16:09

## 2023-01-01 RX ADMIN — BENZONATATE 100 MG: 100 CAPSULE ORAL at 22:38

## 2023-01-01 RX ADMIN — LEVALBUTEROL HYDROCHLORIDE 1.25 MG: 1.25 SOLUTION RESPIRATORY (INHALATION) at 15:43

## 2023-01-01 RX ADMIN — MONTELUKAST 10 MG: 10 TABLET, FILM COATED ORAL at 22:32

## 2023-01-01 RX ADMIN — PIPERACILLIN AND TAZOBACTAM 2.25 G: 2; .25 INJECTION, POWDER, FOR SOLUTION INTRAVENOUS at 06:30

## 2023-01-01 RX ADMIN — Medication 50 MCG: at 09:48

## 2023-01-01 RX ADMIN — Medication 50 MCG: at 07:01

## 2023-01-01 RX ADMIN — SODIUM CHLORIDE 300 ML: 9 INJECTION, SOLUTION INTRAVENOUS at 12:27

## 2023-01-01 RX ADMIN — VALGANCICLOVIR HYDROCHLORIDE 450 MG: 450 TABLET ORAL at 13:40

## 2023-01-01 RX ADMIN — SODIUM CHLORIDE 300 ML: 9 INJECTION, SOLUTION INTRAVENOUS at 07:59

## 2023-01-01 RX ADMIN — GUAIFENESIN 200 MG: 200 SOLUTION ORAL at 09:54

## 2023-01-01 RX ADMIN — ONDANSETRON 4 MG: 2 INJECTION INTRAMUSCULAR; INTRAVENOUS at 15:02

## 2023-01-01 RX ADMIN — AZATHIOPRINE 50 MG: 50 TABLET ORAL at 20:46

## 2023-01-01 RX ADMIN — ACETYLCYSTEINE 2 ML: 200 SOLUTION ORAL; RESPIRATORY (INHALATION) at 13:02

## 2023-01-01 RX ADMIN — ONDANSETRON 4 MG: 2 INJECTION INTRAMUSCULAR; INTRAVENOUS at 11:19

## 2023-01-01 RX ADMIN — MIDODRINE HYDROCHLORIDE 10 MG: 5 TABLET ORAL at 12:25

## 2023-01-01 RX ADMIN — B-COMPLEX W/ C & FOLIC ACID TAB 1 MG 1 TABLET: 1 TAB at 11:05

## 2023-01-01 RX ADMIN — PIPERACILLIN AND TAZOBACTAM 2.25 G: 2; .25 INJECTION, POWDER, FOR SOLUTION INTRAVENOUS at 18:31

## 2023-01-01 RX ADMIN — DIPHENHYDRAMINE HYDROCHLORIDE 50 MG: 25 CAPSULE ORAL at 09:48

## 2023-01-01 RX ADMIN — LEVALBUTEROL HYDROCHLORIDE 1.25 MG: 1.25 SOLUTION RESPIRATORY (INHALATION) at 14:13

## 2023-01-01 RX ADMIN — MIDODRINE HYDROCHLORIDE 10 MG: 5 TABLET ORAL at 08:43

## 2023-01-01 RX ADMIN — Medication: at 07:59

## 2023-01-01 RX ADMIN — GADOBUTROL 6 ML: 604.72 INJECTION INTRAVENOUS at 14:29

## 2023-01-01 RX ADMIN — PANTOPRAZOLE SODIUM 40 MG: 40 TABLET, DELAYED RELEASE ORAL at 07:01

## 2023-01-01 RX ADMIN — PREDNISONE 5 MG: 5 TABLET ORAL at 07:01

## 2023-01-01 RX ADMIN — HEPARIN SODIUM 5000 UNITS: 5000 INJECTION, SOLUTION INTRAVENOUS; SUBCUTANEOUS at 09:57

## 2023-01-01 RX ADMIN — POSACONAZOLE 300 MG: 100 TABLET, DELAYED RELEASE ORAL at 09:45

## 2023-01-01 RX ADMIN — HEPARIN SODIUM 5000 UNITS: 5000 INJECTION, SOLUTION INTRAVENOUS; SUBCUTANEOUS at 20:25

## 2023-01-01 RX ADMIN — ACETYLCYSTEINE 2 ML: 200 SOLUTION ORAL; RESPIRATORY (INHALATION) at 20:33

## 2023-01-01 RX ADMIN — POSACONAZOLE 300 MG: 100 TABLET, DELAYED RELEASE ORAL at 07:01

## 2023-01-01 RX ADMIN — BENZONATATE 100 MG: 100 CAPSULE ORAL at 07:03

## 2023-01-01 RX ADMIN — PROPOFOL 170 MCG/KG/MIN: 10 INJECTION, EMULSION INTRAVENOUS at 10:54

## 2023-01-01 RX ADMIN — PIPERACILLIN SODIUM AND TAZOBACTAM SODIUM 2.25 G: 2; .25 INJECTION, POWDER, LYOPHILIZED, FOR SOLUTION INTRAVENOUS at 00:32

## 2023-01-01 RX ADMIN — DIPHENHYDRAMINE HYDROCHLORIDE 50 MG: 50 INJECTION, SOLUTION INTRAMUSCULAR; INTRAVENOUS at 16:19

## 2023-01-01 RX ADMIN — TACROLIMUS 0.5 MG: 0.5 CAPSULE ORAL at 18:31

## 2023-01-01 RX ADMIN — B-COMPLEX W/ C & FOLIC ACID TAB 1 MG 1 TABLET: 1 TAB at 09:41

## 2023-01-01 RX ADMIN — HYDROCORTISONE: 25 OINTMENT TOPICAL at 20:46

## 2023-01-01 RX ADMIN — METOPROLOL TARTRATE 25 MG: 25 TABLET, FILM COATED ORAL at 20:25

## 2023-01-01 RX ADMIN — PREDNISONE 20 MG: 20 TABLET ORAL at 12:47

## 2023-01-01 RX ADMIN — LEVALBUTEROL HYDROCHLORIDE 1.25 MG: 1.25 SOLUTION RESPIRATORY (INHALATION) at 20:22

## 2023-01-01 RX ADMIN — BENZONATATE 100 MG: 100 CAPSULE ORAL at 15:48

## 2023-01-01 RX ADMIN — MIDODRINE HYDROCHLORIDE 10 MG: 5 TABLET ORAL at 12:03

## 2023-01-01 RX ADMIN — MINOCYCLINE HYDROCHLORIDE 200 MG: 100 INJECTION INTRAVENOUS at 16:04

## 2023-01-01 RX ADMIN — SODIUM CHLORIDE 300 ML: 9 INJECTION, SOLUTION INTRAVENOUS at 14:53

## 2023-01-01 RX ADMIN — MONTELUKAST 10 MG: 10 TABLET, FILM COATED ORAL at 21:38

## 2023-01-01 RX ADMIN — CALCIUM ACETATE 667 MG: 667 CAPSULE ORAL at 18:59

## 2023-01-01 RX ADMIN — HYDROXYZINE HYDROCHLORIDE 25 MG: 25 TABLET, FILM COATED ORAL at 06:29

## 2023-01-01 RX ADMIN — DEXAMETHASONE SODIUM PHOSPHATE 10 MG: 4 INJECTION, SOLUTION INTRA-ARTICULAR; INTRALESIONAL; INTRAMUSCULAR; INTRAVENOUS; SOFT TISSUE at 15:30

## 2023-01-01 RX ADMIN — POSACONAZOLE 300 MG: 100 TABLET, DELAYED RELEASE ORAL at 12:45

## 2023-01-01 RX ADMIN — FLUTICASONE FUROATE AND VILANTEROL TRIFENATATE 1 PUFF: 100; 25 POWDER RESPIRATORY (INHALATION) at 09:58

## 2023-01-01 RX ADMIN — TACROLIMUS 0.5 MG: 0.5 CAPSULE ORAL at 08:42

## 2023-01-01 RX ADMIN — ALBUMIN HUMAN 250 ML: 0.05 INJECTION, SOLUTION INTRAVENOUS at 12:28

## 2023-01-01 RX ADMIN — HYDROCORTISONE: 25 OINTMENT TOPICAL at 07:01

## 2023-01-01 RX ADMIN — ACETAMINOPHEN 325 MG: 325 TABLET, FILM COATED ORAL at 16:08

## 2023-01-01 RX ADMIN — SULFAMETHOXAZOLE AND TRIMETHOPRIM 1 TABLET: 400; 80 TABLET ORAL at 21:46

## 2023-01-01 RX ADMIN — MIDODRINE HYDROCHLORIDE 10 MG: 5 TABLET ORAL at 15:19

## 2023-01-01 RX ADMIN — PREDNISONE 20 MG: 20 TABLET ORAL at 16:49

## 2023-01-01 RX ADMIN — VANCOMYCIN HYDROCHLORIDE 1250 MG: 10 INJECTION, POWDER, LYOPHILIZED, FOR SOLUTION INTRAVENOUS at 17:21

## 2023-01-01 RX ADMIN — TRIAMCINOLONE ACETONIDE: 1 CREAM TOPICAL at 08:38

## 2023-01-01 RX ADMIN — SUGAMMADEX 200 MG: 100 INJECTION, SOLUTION INTRAVENOUS at 11:17

## 2023-01-01 RX ADMIN — LEVALBUTEROL HYDROCHLORIDE 1.25 MG: 1.25 SOLUTION RESPIRATORY (INHALATION) at 13:41

## 2023-01-01 RX ADMIN — ONDANSETRON 4 MG: 2 INJECTION INTRAMUSCULAR; INTRAVENOUS at 08:32

## 2023-01-01 RX ADMIN — MIDODRINE HYDROCHLORIDE 10 MG: 5 TABLET ORAL at 18:19

## 2023-01-01 RX ADMIN — TRIAMCINOLONE ACETONIDE: 1 CREAM TOPICAL at 19:12

## 2023-01-01 RX ADMIN — ACETAMINOPHEN 325 MG: 325 TABLET, FILM COATED ORAL at 21:10

## 2023-01-01 RX ADMIN — HEPARIN SODIUM 5000 UNITS: 5000 INJECTION, SOLUTION INTRAVENOUS; SUBCUTANEOUS at 11:53

## 2023-01-01 RX ADMIN — ESMOLOL HYDROCHLORIDE 20 MG: 10 INJECTION, SOLUTION INTRAVENOUS at 11:00

## 2023-01-01 RX ADMIN — Medication 50 MG: at 14:36

## 2023-01-01 RX ADMIN — FENTANYL CITRATE 100 MCG: 50 INJECTION INTRAMUSCULAR; INTRAVENOUS at 14:39

## 2023-01-01 RX ADMIN — MIDODRINE HYDROCHLORIDE 10 MG: 5 TABLET ORAL at 18:23

## 2023-01-01 RX ADMIN — HEPARIN SODIUM 5000 UNITS: 5000 INJECTION, SOLUTION INTRAVENOUS; SUBCUTANEOUS at 22:31

## 2023-01-01 RX ADMIN — TETROFOSMIN 41.8 MILLICURIE: 1.38 INJECTION, POWDER, LYOPHILIZED, FOR SOLUTION INTRAVENOUS at 12:18

## 2023-01-01 RX ADMIN — HYDROCORTISONE: 25 OINTMENT TOPICAL at 08:57

## 2023-01-01 RX ADMIN — AMOXICILLIN AND CLAVULANATE POTASSIUM 1 TABLET: 500; 125 TABLET, FILM COATED ORAL at 11:41

## 2023-01-01 RX ADMIN — SODIUM CHLORIDE, POTASSIUM CHLORIDE, SODIUM LACTATE AND CALCIUM CHLORIDE: 600; 310; 30; 20 INJECTION, SOLUTION INTRAVENOUS at 10:49

## 2023-01-01 RX ADMIN — RITUXIMAB-ABBS 600 MG: 10 INJECTION, SOLUTION INTRAVENOUS at 10:25

## 2023-01-01 RX ADMIN — PIPERACILLIN AND TAZOBACTAM 2.25 G: 2; .25 INJECTION, POWDER, FOR SOLUTION INTRAVENOUS at 12:40

## 2023-01-01 RX ADMIN — PIPERACILLIN AND TAZOBACTAM 2.25 G: 2; .25 INJECTION, POWDER, FOR SOLUTION INTRAVENOUS at 23:54

## 2023-01-01 RX ADMIN — TETROFOSMIN 9.1 MILLICURIE: 1.38 INJECTION, POWDER, LYOPHILIZED, FOR SOLUTION INTRAVENOUS at 11:19

## 2023-01-01 RX ADMIN — AZITHROMYCIN 250 MG: 250 TABLET, FILM COATED ORAL at 19:17

## 2023-01-01 RX ADMIN — REGADENOSON 0.4 MG: 0.08 INJECTION, SOLUTION INTRAVENOUS at 12:13

## 2023-01-01 RX ADMIN — BENZONATATE 100 MG: 100 CAPSULE ORAL at 13:30

## 2023-01-01 RX ADMIN — SODIUM CHLORIDE 250 ML: 9 INJECTION, SOLUTION INTRAVENOUS at 14:52

## 2023-01-01 RX ADMIN — Medication 50 MCG: at 19:18

## 2023-01-01 RX ADMIN — SODIUM CHLORIDE, POTASSIUM CHLORIDE, SODIUM LACTATE AND CALCIUM CHLORIDE 500 ML: 600; 310; 30; 20 INJECTION, SOLUTION INTRAVENOUS at 23:03

## 2023-01-01 RX ADMIN — BENZONATATE 100 MG: 100 CAPSULE ORAL at 09:47

## 2023-01-01 RX ADMIN — PANTOPRAZOLE SODIUM 40 MG: 40 TABLET, DELAYED RELEASE ORAL at 12:46

## 2023-01-01 RX ADMIN — POSACONAZOLE 300 MG: 100 TABLET, DELAYED RELEASE ORAL at 08:43

## 2023-01-01 RX ADMIN — FENTANYL CITRATE 50 MCG: 50 INJECTION INTRAMUSCULAR; INTRAVENOUS at 10:50

## 2023-01-01 RX ADMIN — HEPARIN SODIUM 5000 UNITS: 5000 INJECTION, SOLUTION INTRAVENOUS; SUBCUTANEOUS at 12:45

## 2023-01-01 RX ADMIN — FLUTICASONE FUROATE AND VILANTEROL TRIFENATATE 1 PUFF: 100; 25 POWDER RESPIRATORY (INHALATION) at 08:53

## 2023-01-01 RX ADMIN — ACETYLCYSTEINE 2 ML: 200 SOLUTION ORAL; RESPIRATORY (INHALATION) at 13:30

## 2023-01-01 RX ADMIN — PREDNISONE 10 MG: 10 TABLET ORAL at 12:03

## 2023-01-01 RX ADMIN — LIDOCAINE HYDROCHLORIDE 60 MG: 20 INJECTION, SOLUTION INFILTRATION; PERINEURAL at 10:51

## 2023-01-01 RX ADMIN — LEVALBUTEROL HYDROCHLORIDE 1.25 MG: 1.25 SOLUTION RESPIRATORY (INHALATION) at 13:30

## 2023-01-01 RX ADMIN — SULFAMETHOXAZOLE AND TRIMETHOPRIM 1 TABLET: 400; 80 TABLET ORAL at 18:23

## 2023-01-01 RX ADMIN — LEVALBUTEROL HYDROCHLORIDE 1.25 MG: 1.25 SOLUTION RESPIRATORY (INHALATION) at 08:53

## 2023-01-01 RX ADMIN — PIPERACILLIN AND TAZOBACTAM 2.25 G: 2; .25 INJECTION, POWDER, FOR SOLUTION INTRAVENOUS at 20:25

## 2023-01-01 RX ADMIN — ACETYLCYSTEINE 2 ML: 200 SOLUTION ORAL; RESPIRATORY (INHALATION) at 13:16

## 2023-01-01 RX ADMIN — B-COMPLEX W/ C & FOLIC ACID TAB 1 MG 1 TABLET: 1 TAB at 08:52

## 2023-01-01 RX ADMIN — TACROLIMUS 0.5 MG: 0.5 CAPSULE ORAL at 09:42

## 2023-01-01 RX ADMIN — B-COMPLEX W/ C & FOLIC ACID TAB 1 MG 1 TABLET: 1 TAB at 12:03

## 2023-01-01 RX ADMIN — TRIAMCINOLONE ACETONIDE: 1 CREAM TOPICAL at 14:22

## 2023-01-01 RX ADMIN — SODIUM CHLORIDE 300 ML: 9 INJECTION, SOLUTION INTRAVENOUS at 07:57

## 2023-01-01 RX ADMIN — PIPERACILLIN AND TAZOBACTAM 2.25 G: 2; .25 INJECTION, POWDER, FOR SOLUTION INTRAVENOUS at 05:23

## 2023-01-01 RX ADMIN — AZITHROMYCIN 250 MG: 250 TABLET, FILM COATED ORAL at 11:56

## 2023-01-01 RX ADMIN — MIDODRINE HYDROCHLORIDE 10 MG: 5 TABLET ORAL at 12:40

## 2023-01-01 RX ADMIN — MIDODRINE HYDROCHLORIDE 10 MG: 5 TABLET ORAL at 11:55

## 2023-01-01 RX ADMIN — PIPERACILLIN AND TAZOBACTAM 2.25 G: 2; .25 INJECTION, POWDER, FOR SOLUTION INTRAVENOUS at 02:55

## 2023-01-01 RX ADMIN — BENZONATATE 100 MG: 100 CAPSULE ORAL at 08:51

## 2023-01-01 RX ADMIN — AZATHIOPRINE 75 MG: 50 TABLET ORAL at 09:42

## 2023-01-01 RX ADMIN — DEXAMETHASONE SODIUM PHOSPHATE 4 MG: 4 INJECTION, SOLUTION INTRA-ARTICULAR; INTRALESIONAL; INTRAMUSCULAR; INTRAVENOUS; SOFT TISSUE at 10:51

## 2023-01-01 RX ADMIN — AZATHIOPRINE 50 MG: 50 TABLET ORAL at 22:32

## 2023-01-01 RX ADMIN — MIDODRINE HYDROCHLORIDE 10 MG: 5 TABLET ORAL at 08:51

## 2023-01-01 RX ADMIN — LIDOCAINE HYDROCHLORIDE 0.5 ML: 10 INJECTION, SOLUTION EPIDURAL; INFILTRATION; INTRACAUDAL; PERINEURAL at 07:51

## 2023-01-01 RX ADMIN — PROPOFOL 80 MG: 10 INJECTION, EMULSION INTRAVENOUS at 14:33

## 2023-01-01 RX ADMIN — BENZONATATE 100 MG: 100 CAPSULE ORAL at 12:07

## 2023-01-01 RX ADMIN — Medication 50 MCG: at 12:46

## 2023-01-01 RX ADMIN — ALBUMIN HUMAN 250 ML: 0.05 INJECTION, SOLUTION INTRAVENOUS at 07:58

## 2023-01-01 RX ADMIN — POSACONAZOLE 300 MG: 100 TABLET, DELAYED RELEASE ORAL at 08:59

## 2023-01-01 RX ADMIN — ALBUMIN HUMAN 25 G: 0.05 INJECTION, SOLUTION INTRAVENOUS at 21:22

## 2023-01-01 RX ADMIN — PROPOFOL 30 MG: 10 INJECTION, EMULSION INTRAVENOUS at 15:16

## 2023-01-01 RX ADMIN — EPOETIN ALFA-EPBX 6000 UNITS: 10000 INJECTION, SOLUTION INTRAVENOUS; SUBCUTANEOUS at 14:48

## 2023-01-01 RX ADMIN — MONTELUKAST 10 MG: 10 TABLET, FILM COATED ORAL at 21:53

## 2023-01-01 RX ADMIN — VALGANCICLOVIR HYDROCHLORIDE 450 MG: 450 TABLET ORAL at 12:47

## 2023-01-01 RX ADMIN — TACROLIMUS 0.5 MG: 0.5 CAPSULE ORAL at 18:19

## 2023-01-01 RX ADMIN — PIPERACILLIN AND TAZOBACTAM 2.25 G: 2; .25 INJECTION, POWDER, FOR SOLUTION INTRAVENOUS at 12:25

## 2023-01-01 RX ADMIN — SODIUM CHLORIDE 250 ML: 9 INJECTION, SOLUTION INTRAVENOUS at 07:59

## 2023-01-01 RX ADMIN — PANTOPRAZOLE SODIUM 40 MG: 40 TABLET, DELAYED RELEASE ORAL at 11:54

## 2023-01-01 RX ADMIN — HEPARIN SODIUM 5000 UNITS: 5000 INJECTION, SOLUTION INTRAVENOUS; SUBCUTANEOUS at 19:22

## 2023-01-01 RX ADMIN — MONTELUKAST 10 MG: 10 TABLET, FILM COATED ORAL at 22:26

## 2023-01-01 RX ADMIN — TACROLIMUS 1 MG: 1 CAPSULE ORAL at 18:23

## 2023-01-01 RX ADMIN — B-COMPLEX W/ C & FOLIC ACID TAB 1 MG 1 TABLET: 1 TAB at 08:43

## 2023-01-01 RX ADMIN — PIPERACILLIN AND TAZOBACTAM 2.25 G: 2; .25 INJECTION, POWDER, FOR SOLUTION INTRAVENOUS at 06:09

## 2023-01-01 RX ADMIN — LIDOCAINE HYDROCHLORIDE 0.5 ML: 10 INJECTION, SOLUTION EPIDURAL; INFILTRATION; INTRACAUDAL; PERINEURAL at 08:03

## 2023-01-01 RX ADMIN — LIDOCAINE HYDROCHLORIDE 0.5 ML: 10 INJECTION, SOLUTION EPIDURAL; INFILTRATION; INTRACAUDAL; PERINEURAL at 15:23

## 2023-01-01 RX ADMIN — LEVALBUTEROL HYDROCHLORIDE 1.25 MG: 1.25 SOLUTION RESPIRATORY (INHALATION) at 20:07

## 2023-01-01 RX ADMIN — HEPARIN SODIUM 1 BAG: 200 INJECTION, SOLUTION INTRAVENOUS at 08:36

## 2023-01-01 RX ADMIN — LIDOCAINE HYDROCHLORIDE 0.5 ML: 10 INJECTION, SOLUTION EPIDURAL; INFILTRATION; INTRACAUDAL; PERINEURAL at 07:57

## 2023-01-01 RX ADMIN — FLUTICASONE FUROATE AND VILANTEROL TRIFENATATE 1 PUFF: 100; 25 POWDER RESPIRATORY (INHALATION) at 07:02

## 2023-01-01 RX ADMIN — PIPERACILLIN SODIUM AND TAZOBACTAM SODIUM 2.25 G: 2; .25 INJECTION, POWDER, LYOPHILIZED, FOR SOLUTION INTRAVENOUS at 19:12

## 2023-01-01 RX ADMIN — LEVALBUTEROL HYDROCHLORIDE 1.25 MG: 1.25 SOLUTION RESPIRATORY (INHALATION) at 20:36

## 2023-01-01 RX ADMIN — LEVALBUTEROL HYDROCHLORIDE 1.25 MG: 1.25 SOLUTION RESPIRATORY (INHALATION) at 08:43

## 2023-01-01 RX ADMIN — LEVALBUTEROL HYDROCHLORIDE 1.25 MG: 1.25 SOLUTION RESPIRATORY (INHALATION) at 07:47

## 2023-01-01 RX ADMIN — AZITHROMYCIN 250 MG: 250 TABLET, FILM COATED ORAL at 12:03

## 2023-01-01 RX ADMIN — PIPERACILLIN AND TAZOBACTAM 2.25 G: 2; .25 INJECTION, POWDER, FOR SOLUTION INTRAVENOUS at 00:18

## 2023-01-01 RX ADMIN — MINOCYCLINE HYDROCHLORIDE 200 MG: 100 INJECTION INTRAVENOUS at 03:57

## 2023-01-01 RX ADMIN — ACETYLCYSTEINE 2 ML: 200 SOLUTION ORAL; RESPIRATORY (INHALATION) at 07:44

## 2023-01-01 RX ADMIN — IODIXANOL 20 ML: 320 INJECTION, SOLUTION INTRAVASCULAR at 09:23

## 2023-01-01 RX ADMIN — PREDNISONE 5 MG: 5 TABLET ORAL at 09:46

## 2023-01-01 RX ADMIN — MIDODRINE HYDROCHLORIDE 10 MG: 5 TABLET ORAL at 09:00

## 2023-01-01 RX ADMIN — VALGANCICLOVIR HYDROCHLORIDE 450 MG: 450 TABLET ORAL at 11:06

## 2023-01-01 RX ADMIN — MIDODRINE HYDROCHLORIDE 10 MG: 5 TABLET ORAL at 18:31

## 2023-01-01 RX ADMIN — PIPERACILLIN AND TAZOBACTAM 2.25 G: 2; .25 INJECTION, POWDER, FOR SOLUTION INTRAVENOUS at 17:17

## 2023-01-01 RX ADMIN — POTASSIUM & SODIUM PHOSPHATES POWDER PACK 280-160-250 MG 2 PACKET: 280-160-250 PACK at 06:30

## 2023-01-01 RX ADMIN — MIDODRINE HYDROCHLORIDE 10 MG: 5 TABLET ORAL at 18:09

## 2023-01-01 RX ADMIN — TRIAMCINOLONE ACETONIDE: 1 CREAM TOPICAL at 19:22

## 2023-01-01 RX ADMIN — BENZONATATE 100 MG: 100 CAPSULE ORAL at 21:52

## 2023-01-01 RX ADMIN — PIPERACILLIN AND TAZOBACTAM 2.25 G: 2; .25 INJECTION, POWDER, FOR SOLUTION INTRAVENOUS at 12:19

## 2023-01-01 RX ADMIN — DIPHENHYDRAMINE HYDROCHLORIDE 25 MG: 25 CAPSULE ORAL at 21:31

## 2023-01-01 RX ADMIN — PIPERACILLIN AND TAZOBACTAM 2.25 G: 2; .25 INJECTION, POWDER, FOR SOLUTION INTRAVENOUS at 09:50

## 2023-01-01 RX ADMIN — CALCIUM ACETATE 667 MG: 667 CAPSULE ORAL at 09:42

## 2023-01-01 RX ADMIN — POSACONAZOLE 300 MG: 100 TABLET, DELAYED RELEASE ORAL at 11:54

## 2023-01-01 RX ADMIN — SODIUM PHOSPHATE, MONOBASIC, MONOHYDRATE AND SODIUM PHOSPHATE, DIBASIC, ANHYDROUS 70 ML: 142; 276 INJECTION, SOLUTION INTRAVENOUS at 14:30

## 2023-01-01 RX ADMIN — PIPERACILLIN AND TAZOBACTAM 2.25 G: 2; .25 INJECTION, POWDER, FOR SOLUTION INTRAVENOUS at 00:04

## 2023-01-01 RX ADMIN — FLUTICASONE FUROATE AND VILANTEROL TRIFENATATE 1 PUFF: 100; 25 POWDER RESPIRATORY (INHALATION) at 09:43

## 2023-01-01 RX ADMIN — ACETYLCYSTEINE 2 ML: 200 SOLUTION ORAL; RESPIRATORY (INHALATION) at 14:13

## 2023-01-01 RX ADMIN — ACETYLCYSTEINE 2 ML: 200 SOLUTION ORAL; RESPIRATORY (INHALATION) at 15:43

## 2023-01-01 RX ADMIN — ACETYLCYSTEINE 2 ML: 200 SOLUTION ORAL; RESPIRATORY (INHALATION) at 11:29

## 2023-01-01 RX ADMIN — ACETYLCYSTEINE 2 ML: 200 SOLUTION ORAL; RESPIRATORY (INHALATION) at 08:43

## 2023-01-01 RX ADMIN — ACETYLCYSTEINE 2 ML: 200 SOLUTION ORAL; RESPIRATORY (INHALATION) at 20:07

## 2023-01-01 RX ADMIN — MIDODRINE HYDROCHLORIDE 10 MG: 5 TABLET ORAL at 08:52

## 2023-01-01 RX ADMIN — HYDROCORTISONE: 25 OINTMENT TOPICAL at 10:12

## 2023-01-01 RX ADMIN — ACETYLCYSTEINE 2 ML: 200 SOLUTION ORAL; RESPIRATORY (INHALATION) at 16:37

## 2023-01-01 RX ADMIN — POTASSIUM & SODIUM PHOSPHATES POWDER PACK 280-160-250 MG 2 PACKET: 280-160-250 PACK at 23:54

## 2023-01-01 RX ADMIN — PROPOFOL 160 MG: 10 INJECTION, EMULSION INTRAVENOUS at 10:51

## 2023-01-01 RX ADMIN — HEPARIN SODIUM 5000 UNITS: 5000 INJECTION, SOLUTION INTRAVENOUS; SUBCUTANEOUS at 07:03

## 2023-01-01 RX ADMIN — SODIUM PHOSPHATE, MONOBASIC, MONOHYDRATE AND SODIUM PHOSPHATE, DIBASIC, ANHYDROUS 15 MMOL: 142; 276 INJECTION, SOLUTION INTRAVENOUS at 19:11

## 2023-01-01 RX ADMIN — ACETYLCYSTEINE 2 ML: 200 SOLUTION ORAL; RESPIRATORY (INHALATION) at 20:36

## 2023-01-01 RX ADMIN — LEVALBUTEROL HYDROCHLORIDE 1.25 MG: 1.25 SOLUTION RESPIRATORY (INHALATION) at 20:30

## 2023-01-01 RX ADMIN — FENTANYL CITRATE 50 MCG: 50 INJECTION, SOLUTION INTRAMUSCULAR; INTRAVENOUS at 08:48

## 2023-01-01 RX ADMIN — MIDAZOLAM HYDROCHLORIDE 0.5 MG: 1 INJECTION, SOLUTION INTRAMUSCULAR; INTRAVENOUS at 08:53

## 2023-01-01 RX ADMIN — B-COMPLEX W/ C & FOLIC ACID TAB 1 MG 1 TABLET: 1 TAB at 09:42

## 2023-01-01 RX ADMIN — ACETYLCYSTEINE 2 ML: 200 SOLUTION ORAL; RESPIRATORY (INHALATION) at 20:30

## 2023-01-01 RX ADMIN — LEVALBUTEROL HYDROCHLORIDE 1.25 MG: 1.25 SOLUTION RESPIRATORY (INHALATION) at 13:07

## 2023-01-01 RX ADMIN — HUMAN ALBUMIN MICROSPHERES AND PERFLUTREN 5 ML: 10; .22 INJECTION, SOLUTION INTRAVENOUS at 08:47

## 2023-01-01 RX ADMIN — LEVALBUTEROL HYDROCHLORIDE 1.25 MG: 1.25 SOLUTION RESPIRATORY (INHALATION) at 21:17

## 2023-01-01 RX ADMIN — PANTOPRAZOLE SODIUM 40 MG: 40 TABLET, DELAYED RELEASE ORAL at 09:42

## 2023-01-01 RX ADMIN — PREDNISONE 15 MG: 5 TABLET ORAL at 19:17

## 2023-01-01 RX ADMIN — MIDODRINE HYDROCHLORIDE 10 MG: 5 TABLET ORAL at 11:41

## 2023-01-01 RX ADMIN — HEPARIN SODIUM 5000 UNITS: 5000 INJECTION, SOLUTION INTRAVENOUS; SUBCUTANEOUS at 19:19

## 2023-01-01 RX ADMIN — MIDAZOLAM HYDROCHLORIDE 0.5 MG: 1 INJECTION, SOLUTION INTRAMUSCULAR; INTRAVENOUS at 09:02

## 2023-01-01 RX ADMIN — BENZONATATE 100 MG: 100 CAPSULE ORAL at 21:22

## 2023-01-01 RX ADMIN — HYDROCORTISONE: 25 OINTMENT TOPICAL at 20:19

## 2023-01-01 RX ADMIN — TACROLIMUS 0.5 MG: 0.5 CAPSULE ORAL at 12:47

## 2023-01-01 RX ADMIN — SODIUM CHLORIDE, POTASSIUM CHLORIDE, SODIUM LACTATE AND CALCIUM CHLORIDE: 600; 310; 30; 20 INJECTION, SOLUTION INTRAVENOUS at 14:30

## 2023-01-01 RX ADMIN — MIDODRINE HYDROCHLORIDE 10 MG: 5 TABLET ORAL at 19:22

## 2023-01-01 RX ADMIN — LEVALBUTEROL HYDROCHLORIDE 1.25 MG: 1.25 SOLUTION RESPIRATORY (INHALATION) at 13:58

## 2023-01-01 RX ADMIN — TACROLIMUS 0.5 MG: 0.5 CAPSULE ORAL at 07:01

## 2023-01-01 RX ADMIN — TRIAMCINOLONE ACETONIDE: 1 OINTMENT TOPICAL at 07:01

## 2023-01-01 RX ADMIN — B-COMPLEX W/ C & FOLIC ACID TAB 1 MG 1 TABLET: 1 TAB at 11:54

## 2023-01-01 RX ADMIN — TACROLIMUS 0.5 MG: 0.5 CAPSULE ORAL at 11:06

## 2023-01-01 RX ADMIN — ACETAMINOPHEN 325 MG: 325 TABLET, FILM COATED ORAL at 10:08

## 2023-01-01 RX ADMIN — PREDNISONE 5 MG: 5 TABLET ORAL at 09:42

## 2023-01-01 RX ADMIN — POSACONAZOLE 300 MG: 100 TABLET, DELAYED RELEASE ORAL at 19:16

## 2023-01-01 RX ADMIN — BENZONATATE 100 MG: 100 CAPSULE ORAL at 06:29

## 2023-01-01 RX ADMIN — ACETAMINOPHEN 650 MG: 325 TABLET ORAL at 09:48

## 2023-01-01 RX ADMIN — PIPERACILLIN AND TAZOBACTAM 2.25 G: 2; .25 INJECTION, POWDER, FOR SOLUTION INTRAVENOUS at 17:04

## 2023-01-01 RX ADMIN — SODIUM CHLORIDE, PRESERVATIVE FREE 84 ML: 5 INJECTION INTRAVENOUS at 15:45

## 2023-01-01 RX ADMIN — BENZONATATE 100 MG: 100 CAPSULE ORAL at 16:08

## 2023-01-01 RX ADMIN — PIPERACILLIN AND TAZOBACTAM 2.25 G: 2; .25 INJECTION, POWDER, FOR SOLUTION INTRAVENOUS at 22:26

## 2023-01-01 RX ADMIN — HEPARIN SODIUM 5000 UNITS: 5000 INJECTION, SOLUTION INTRAVENOUS; SUBCUTANEOUS at 21:38

## 2023-01-01 RX ADMIN — IOPAMIDOL 67 ML: 755 INJECTION, SOLUTION INTRAVENOUS at 20:52

## 2023-01-01 RX ADMIN — HEPARIN SODIUM 5000 UNITS: 5000 INJECTION, SOLUTION INTRAVENOUS; SUBCUTANEOUS at 09:41

## 2023-01-01 RX ADMIN — TACROLIMUS 1 MG: 1 CAPSULE ORAL at 20:24

## 2023-01-01 RX ADMIN — MIDODRINE HYDROCHLORIDE 10 MG: 5 TABLET ORAL at 16:09

## 2023-01-01 RX ADMIN — AZATHIOPRINE 50 MG: 50 TABLET ORAL at 20:31

## 2023-01-01 RX ADMIN — PIPERACILLIN AND TAZOBACTAM 2.25 G: 2; .25 INJECTION, POWDER, FOR SOLUTION INTRAVENOUS at 00:53

## 2023-01-01 RX ADMIN — EPOETIN ALFA-EPBX 6000 UNITS: 10000 INJECTION, SOLUTION INTRAVENOUS; SUBCUTANEOUS at 11:01

## 2023-01-01 RX ADMIN — LEVALBUTEROL HYDROCHLORIDE 1.25 MG: 1.25 SOLUTION RESPIRATORY (INHALATION) at 11:29

## 2023-01-01 RX ADMIN — VALGANCICLOVIR HYDROCHLORIDE 450 MG: 450 TABLET ORAL at 07:03

## 2023-01-01 RX ADMIN — PANTOPRAZOLE SODIUM 40 MG: 40 TABLET, DELAYED RELEASE ORAL at 12:03

## 2023-01-01 RX ADMIN — SULFAMETHOXAZOLE AND TRIMETHOPRIM 1 TABLET: 400; 80 TABLET ORAL at 19:22

## 2023-01-01 RX ADMIN — ACETYLCYSTEINE 2 ML: 200 SOLUTION ORAL; RESPIRATORY (INHALATION) at 13:07

## 2023-01-01 RX ADMIN — MONTELUKAST 10 MG: 10 TABLET, FILM COATED ORAL at 21:46

## 2023-01-01 RX ADMIN — SULFAMETHOXAZOLE AND TRIMETHOPRIM 1 TABLET: 400; 80 TABLET ORAL at 18:25

## 2023-01-01 RX ADMIN — TACROLIMUS 0.5 MG: 0.5 CAPSULE ORAL at 19:22

## 2023-01-01 RX ADMIN — MAGNESIUM SULFATE IN WATER 2 G: 40 INJECTION, SOLUTION INTRAVENOUS at 01:02

## 2023-01-01 RX ADMIN — MIDAZOLAM HYDROCHLORIDE 1 MG: 1 INJECTION, SOLUTION INTRAMUSCULAR; INTRAVENOUS at 08:48

## 2023-01-01 RX ADMIN — PREDNISONE 15 MG: 5 TABLET ORAL at 08:48

## 2023-01-01 RX ADMIN — FENTANYL CITRATE 50 MCG: 50 INJECTION INTRAMUSCULAR; INTRAVENOUS at 10:55

## 2023-01-01 RX ADMIN — HEPARIN SODIUM 5000 UNITS: 5000 INJECTION, SOLUTION INTRAVENOUS; SUBCUTANEOUS at 12:12

## 2023-01-01 RX ADMIN — POSACONAZOLE 300 MG: 100 TABLET, DELAYED RELEASE ORAL at 11:05

## 2023-01-01 RX ADMIN — HEPARIN SODIUM 5000 UNITS: 5000 INJECTION, SOLUTION INTRAVENOUS; SUBCUTANEOUS at 08:42

## 2023-01-01 RX ADMIN — VALGANCICLOVIR HYDROCHLORIDE 450 MG: 450 TABLET ORAL at 12:04

## 2023-01-01 RX ADMIN — AMOXICILLIN AND CLAVULANATE POTASSIUM 1 TABLET: 500; 125 TABLET, FILM COATED ORAL at 11:54

## 2023-01-01 RX ADMIN — POSACONAZOLE 300 MG: 100 TABLET, DELAYED RELEASE ORAL at 12:16

## 2023-01-01 RX ADMIN — PIPERACILLIN AND TAZOBACTAM 2.25 G: 2; .25 INJECTION, POWDER, FOR SOLUTION INTRAVENOUS at 12:45

## 2023-01-01 RX ADMIN — Medication: at 07:58

## 2023-01-01 RX ADMIN — ACETYLCYSTEINE 4 ML: 100 SOLUTION ORAL; RESPIRATORY (INHALATION) at 21:58

## 2023-01-01 RX ADMIN — SODIUM CHLORIDE 250 ML: 9 INJECTION, SOLUTION INTRAVENOUS at 07:50

## 2023-01-01 RX ADMIN — MONTELUKAST 10 MG: 10 TABLET, FILM COATED ORAL at 21:11

## 2023-01-01 RX ADMIN — LEVALBUTEROL HYDROCHLORIDE 1.25 MG: 1.25 SOLUTION RESPIRATORY (INHALATION) at 07:44

## 2023-01-01 RX ADMIN — POTASSIUM & SODIUM PHOSPHATES POWDER PACK 280-160-250 MG 2 PACKET: 280-160-250 PACK at 18:09

## 2023-01-01 RX ADMIN — Medication: at 07:51

## 2023-01-01 RX ADMIN — MIDODRINE HYDROCHLORIDE 10 MG: 5 TABLET ORAL at 12:47

## 2023-01-01 RX ADMIN — AZITHROMYCIN 250 MG: 250 TABLET, FILM COATED ORAL at 08:42

## 2023-01-01 RX ADMIN — ONDANSETRON 4 MG: 2 INJECTION INTRAMUSCULAR; INTRAVENOUS at 17:40

## 2023-01-01 RX ADMIN — Medication: at 12:28

## 2023-01-01 RX ADMIN — DEXAMETHASONE SODIUM PHOSPHATE 8 MG: 4 INJECTION, SOLUTION INTRA-ARTICULAR; INTRALESIONAL; INTRAMUSCULAR; INTRAVENOUS; SOFT TISSUE at 14:37

## 2023-01-01 RX ADMIN — FENTANYL CITRATE 25 MCG: 50 INJECTION, SOLUTION INTRAMUSCULAR; INTRAVENOUS at 09:03

## 2023-01-01 RX ADMIN — BENZONATATE 100 MG: 100 CAPSULE ORAL at 20:35

## 2023-01-01 RX ADMIN — MONTELUKAST 10 MG: 10 TABLET, FILM COATED ORAL at 22:11

## 2023-01-01 RX ADMIN — ACETYLCYSTEINE 2 ML: 200 SOLUTION ORAL; RESPIRATORY (INHALATION) at 17:22

## 2023-01-01 RX ADMIN — LIDOCAINE HYDROCHLORIDE 5 ML: 10 INJECTION, SOLUTION EPIDURAL; INFILTRATION; INTRACAUDAL; PERINEURAL at 08:49

## 2023-01-01 RX ADMIN — PIPERACILLIN AND TAZOBACTAM 2.25 G: 2; .25 INJECTION, POWDER, FOR SOLUTION INTRAVENOUS at 18:30

## 2023-01-01 RX ADMIN — AZITHROMYCIN 250 MG: 250 TABLET, FILM COATED ORAL at 11:05

## 2023-01-01 RX ADMIN — MONTELUKAST 10 MG: 10 TABLET, FILM COATED ORAL at 21:14

## 2023-01-01 RX ADMIN — SUGAMMADEX 200 MG: 100 INJECTION, SOLUTION INTRAVENOUS at 15:31

## 2023-01-01 RX ADMIN — ACETYLCYSTEINE 2 ML: 200 SOLUTION ORAL; RESPIRATORY (INHALATION) at 08:53

## 2023-01-01 RX ADMIN — LIDOCAINE: 40 CREAM TOPICAL at 10:21

## 2023-01-01 RX ADMIN — FLUTICASONE FUROATE AND VILANTEROL TRIFENATATE 1 PUFF: 100; 25 POWDER RESPIRATORY (INHALATION) at 12:12

## 2023-01-01 RX ADMIN — TACROLIMUS 0.5 MG: 0.5 CAPSULE ORAL at 12:04

## 2023-01-01 RX ADMIN — LEVALBUTEROL HYDROCHLORIDE 1.25 MG: 1.25 SOLUTION RESPIRATORY (INHALATION) at 13:16

## 2023-01-01 RX ADMIN — PIPERACILLIN AND TAZOBACTAM 2.25 G: 2; .25 INJECTION, POWDER, FOR SOLUTION INTRAVENOUS at 11:17

## 2023-01-01 RX ADMIN — LEVALBUTEROL HYDROCHLORIDE 1.25 MG: 1.25 SOLUTION RESPIRATORY (INHALATION) at 16:37

## 2023-01-01 RX ADMIN — AZATHIOPRINE 75 MG: 50 TABLET ORAL at 21:31

## 2023-01-01 RX ADMIN — FLUTICASONE FUROATE AND VILANTEROL TRIFENATATE 1 PUFF: 100; 25 POWDER RESPIRATORY (INHALATION) at 08:58

## 2023-01-01 RX ADMIN — TRIAMCINOLONE ACETONIDE: 1 CREAM TOPICAL at 20:19

## 2023-01-01 RX ADMIN — HYDROCORTISONE: 25 OINTMENT TOPICAL at 19:12

## 2023-01-01 RX ADMIN — TACROLIMUS 0.5 MG: 0.5 CAPSULE ORAL at 09:45

## 2023-01-01 RX ADMIN — FENTANYL CITRATE 25 MCG: 50 INJECTION, SOLUTION INTRAMUSCULAR; INTRAVENOUS at 08:53

## 2023-01-01 RX ADMIN — VALGANCICLOVIR 450 MG: 450 TABLET, FILM COATED ORAL at 18:59

## 2023-01-01 RX ADMIN — MIDODRINE HYDROCHLORIDE 10 MG: 5 TABLET ORAL at 18:25

## 2023-01-01 RX ADMIN — Medication 50 MCG: at 12:03

## 2023-01-01 RX ADMIN — Medication 50 MCG: at 08:51

## 2023-01-01 RX ADMIN — PANTOPRAZOLE SODIUM 40 MG: 40 TABLET, DELAYED RELEASE ORAL at 08:43

## 2023-01-01 ASSESSMENT — ACTIVITIES OF DAILY LIVING (ADL)
ADLS_ACUITY_SCORE: 32
ADLS_ACUITY_SCORE: 26
ADLS_ACUITY_SCORE: 30
ADLS_ACUITY_SCORE: 30
ADLS_ACUITY_SCORE: 37
ADLS_ACUITY_SCORE: 30
ADLS_ACUITY_SCORE: 30
ADLS_ACUITY_SCORE: 32
ADLS_ACUITY_SCORE: 32
HEARING_DIFFICULTY_OR_DEAF: NO
ADLS_ACUITY_SCORE: 30
DIFFICULTY_EATING/SWALLOWING: NO
ADLS_ACUITY_SCORE: 26
ADLS_ACUITY_SCORE: 30
ADLS_ACUITY_SCORE: 32
ADLS_ACUITY_SCORE: 30
ADLS_ACUITY_SCORE: 26
ADLS_ACUITY_SCORE: 30
ADLS_ACUITY_SCORE: 32
ADLS_ACUITY_SCORE: 32
ADLS_ACUITY_SCORE: 30
DRESSING/BATHING_DIFFICULTY: NO
ADLS_ACUITY_SCORE: 30
TOILETING_ISSUES: NO
ADLS_ACUITY_SCORE: 30
ADLS_ACUITY_SCORE: 30
ADLS_ACUITY_SCORE: 39
DEPENDENT_IADLS:: INDEPENDENT
ADLS_ACUITY_SCORE: 26
ADLS_ACUITY_SCORE: 32
ADLS_ACUITY_SCORE: 26
ADLS_ACUITY_SCORE: 32
ADLS_ACUITY_SCORE: 39
ADLS_ACUITY_SCORE: 32
ADLS_ACUITY_SCORE: 39
ADLS_ACUITY_SCORE: 32
ADLS_ACUITY_SCORE: 30
ADLS_ACUITY_SCORE: 30
ADLS_ACUITY_SCORE: 39
ADLS_ACUITY_SCORE: 39
ADLS_ACUITY_SCORE: 30
ADLS_ACUITY_SCORE: 39
ADLS_ACUITY_SCORE: 26
ADLS_ACUITY_SCORE: 26
ADLS_ACUITY_SCORE: 30
ADLS_ACUITY_SCORE: 30
FALL_HISTORY_WITHIN_LAST_SIX_MONTHS: NO
ADLS_ACUITY_SCORE: 32
ADLS_ACUITY_SCORE: 39
ADLS_ACUITY_SCORE: 30
DIFFICULTY_COMMUNICATING: NO
ADLS_ACUITY_SCORE: 39
ADLS_ACUITY_SCORE: 30
ADLS_ACUITY_SCORE: 39
ADLS_ACUITY_SCORE: 26
ADLS_ACUITY_SCORE: 39
ADLS_ACUITY_SCORE: 39
ADLS_ACUITY_SCORE: 26
ADLS_ACUITY_SCORE: 30
ADLS_ACUITY_SCORE: 30
ADLS_ACUITY_SCORE: 39
ADLS_ACUITY_SCORE: 30
ADLS_ACUITY_SCORE: 39
ADLS_ACUITY_SCORE: 32
ADLS_ACUITY_SCORE: 30
ADLS_ACUITY_SCORE: 26
ADLS_ACUITY_SCORE: 26
ADLS_ACUITY_SCORE: 30
ADLS_ACUITY_SCORE: 26
WEAR_GLASSES_OR_BLIND: YES
ADLS_ACUITY_SCORE: 26
ADLS_ACUITY_SCORE: 39
ADLS_ACUITY_SCORE: 32
ADLS_ACUITY_SCORE: 30
ADLS_ACUITY_SCORE: 26
ADLS_ACUITY_SCORE: 39
ADLS_ACUITY_SCORE: 32
ADLS_ACUITY_SCORE: 39
ADLS_ACUITY_SCORE: 26
ADLS_ACUITY_SCORE: 30
ADLS_ACUITY_SCORE: 32
ADLS_ACUITY_SCORE: 32
ADLS_ACUITY_SCORE: 26
ADLS_ACUITY_SCORE: 26
ADLS_ACUITY_SCORE: 39
ADLS_ACUITY_SCORE: 30
ADLS_ACUITY_SCORE: 30
ADLS_ACUITY_SCORE: 32
ADLS_ACUITY_SCORE: 26
ADLS_ACUITY_SCORE: 30
ADLS_ACUITY_SCORE: 26
ADLS_ACUITY_SCORE: 30
ADLS_ACUITY_SCORE: 30
ADLS_ACUITY_SCORE: 26
WALKING_OR_CLIMBING_STAIRS_DIFFICULTY: NO
ADLS_ACUITY_SCORE: 30
ADLS_ACUITY_SCORE: 26
CONCENTRATING,_REMEMBERING_OR_MAKING_DECISIONS_DIFFICULTY: NO
ADLS_ACUITY_SCORE: 39
ADLS_ACUITY_SCORE: 26
ADLS_ACUITY_SCORE: 32
ADLS_ACUITY_SCORE: 30
CHANGE_IN_FUNCTIONAL_STATUS_SINCE_ONSET_OF_CURRENT_ILLNESS/INJURY: NO
ADLS_ACUITY_SCORE: 30
DOING_ERRANDS_INDEPENDENTLY_DIFFICULTY: YES
ADLS_ACUITY_SCORE: 39
ADLS_ACUITY_SCORE: 30
VISION_MANAGEMENT: GLASSES
ADLS_ACUITY_SCORE: 30
ADLS_ACUITY_SCORE: 39
ADLS_ACUITY_SCORE: 30
ADLS_ACUITY_SCORE: 39
ADLS_ACUITY_SCORE: 30
ADLS_ACUITY_SCORE: 32
ADLS_ACUITY_SCORE: 26
ADLS_ACUITY_SCORE: 26
ADLS_ACUITY_SCORE: 30
ADLS_ACUITY_SCORE: 30
ADLS_ACUITY_SCORE: 26
ADLS_ACUITY_SCORE: 32
ADLS_ACUITY_SCORE: 32
ADLS_ACUITY_SCORE: 39
ADLS_ACUITY_SCORE: 32
ADLS_ACUITY_SCORE: 30
ADLS_ACUITY_SCORE: 26

## 2023-01-01 ASSESSMENT — PAIN SCALES - GENERAL
PAINLEVEL: NO PAIN (0)

## 2023-01-01 ASSESSMENT — ENCOUNTER SYMPTOMS
DYSRHYTHMIAS: 1
DYSRHYTHMIAS: 1

## 2023-01-01 ASSESSMENT — COPD QUESTIONNAIRES: COPD: 0

## 2023-01-01 ASSESSMENT — LIFESTYLE VARIABLES: TOBACCO_USE: 0

## 2023-01-11 ENCOUNTER — TELEPHONE (OUTPATIENT)
Dept: PULMONOLOGY | Facility: CLINIC | Age: 63
End: 2023-01-11

## 2023-01-11 NOTE — TELEPHONE ENCOUNTER
Prior Authorization Retail Medication Request    Medication/Dose: Tacrolimus 0.5mg capsules   ICD code (if different than what is on RX):  Z94.2 s/p lung transplant  Previously Tried and Failed:  na  Rationale:  Immunosuppression medication s/p lung transplant    Insurance Name:  Medicare, secondary BCBS  Insurance ID:  [id number] / BCBS: [id number]      Pharmacy Information (if different than what is on RX)  Name:  [facility] in [place]  Phone:  [phone]

## 2023-01-13 ENCOUNTER — TELEPHONE (OUTPATIENT)
Dept: TRANSPLANT | Facility: CLINIC | Age: 63
End: 2023-01-13

## 2023-01-13 PROCEDURE — 80197 ASSAY OF TACROLIMUS: CPT | Performed by: PHYSICIAN ASSISTANT

## 2023-01-13 NOTE — TELEPHONE ENCOUNTER
DATE:  1/13/2023     TIME OF RECEIPT FROM LAB:  10:28    ORDERING PROVIDER: Claudine    LAB TEST:  Alk phos    LAB VALUE:  298    RESULTS GIVEN WITH READ-BACK TO (PROVIDER):  Mikayla christianson    TIME LAB VALUE REPORTED TO PROVIDER:   10:29

## 2023-01-13 NOTE — TELEPHONE ENCOUNTER
Prior Authorization Approval    Authorization Effective Date: 1/1/2023  Authorization Expiration Date:    Medication: tacrolimus (GENERIC EQUIVALENT) 0.5 MG capsule--APPROVED  Approved Dose/Quantity:   Reference #:     Insurance Company: [employer] - Phone [phone] Fax [phone]  Expected CoPay:       CoPay Card Available:      Foundation Assistance Needed:    Which Pharmacy is filling the prescription (Not needed for infusion/clinic administered): [id number] - [place], [place] - [address] [place] [address]  Pharmacy Notified: Yes  Patient Notified: Yes **Instructed pharmacy to notify patient when script is ready to /ship.**

## 2023-01-13 NOTE — TELEPHONE ENCOUNTER
PA Initiation    Medication: tacrolimus (GENERIC EQUIVALENT) 0.5 MG capsule   Insurance Company: [employer] - Phone [phone] Fax [phone]  Pharmacy Filling the Rx: [id number] - [place] - [address] [place] [address]  Filling Pharmacy Phone: [phone]  Filling Pharmacy Fax: [phone]  Start Date: 1/13/2023

## 2023-01-14 ENCOUNTER — LAB (OUTPATIENT)
Dept: LAB | Facility: CLINIC | Age: 63
End: 2023-01-14
Payer: MEDICARE

## 2023-01-14 DIAGNOSIS — Z94.2 S/P LUNG TRANSPLANT (H): ICD-10-CM

## 2023-01-15 LAB
TACROLIMUS BLD-MCNC: 11.5 UG/L (ref 5–15)
TME LAST DOSE: NORMAL H
TME LAST DOSE: NORMAL H

## 2023-01-17 ENCOUNTER — TELEPHONE (OUTPATIENT)
Dept: TRANSPLANT | Facility: CLINIC | Age: 63
End: 2023-01-17
Payer: MEDICARE

## 2023-01-17 DIAGNOSIS — Z94.2 LUNG REPLACED BY TRANSPLANT (H): ICD-10-CM

## 2023-01-17 RX ORDER — TACROLIMUS 0.5 MG/1
0.5 CAPSULE ORAL 2 TIMES DAILY
Qty: 60 CAPSULE | Refills: 11 | Status: SHIPPED | OUTPATIENT
Start: 2023-01-17 | End: 2023-06-01

## 2023-01-17 NOTE — TELEPHONE ENCOUNTER
Tacrolimus level 11.5 at 12 hours, on 1/13/23.  Goal 8-10.   Current dose 0.5 mg in AM, 1 mg in PM    Dose changed to 0.5 mg in AM, 0.5 mg in PM   Recheck level at upcoming appt in early Feb.     LM for patient.    [employer] message sent.

## 2023-01-26 NOTE — TELEPHONE ENCOUNTER
Bronch washing with positive RSV and rhinovirus.  Patient reports ongoing runny nose with cough that seems worse than usual. Denies fevers or any other symptoms at this time.  Reviewed with Ame Chow and will treat with ribavirin, renally dosed for CrCl of 35 at dose of 200mg TID for 10 days.  Reviewed medication and plan of care with patient and she is in agreement with plan of care.  Instructed patient to stay well hydrated with fluids and also obtain CBC 4 days after starting the drug.     [employer] Pregnancy And Lactation Text: This medication is Pregnancy Category B and is considered safe. There is some data to suggest avoiding during the third trimester. It is unknown if this medication is excreted in breast milk.

## 2023-02-07 NOTE — PROGRESS NOTES
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 She had covid back in November 2022 and still has a slight cough above baseline.     1. Bilateral lung transplant: since covid, feels like she has improved considerably, but still has a mainly dry cough above her baseline. Has been working with PT since her knee surgery and does feel like her endurance has improved. Sating 97% on room air. DSA 5/26/22 and CMV 1/13 negative. CXR reviewed by me with stable basilar and perihilar opacities. PFTs did not meet ATS guidelines today, unable to reproduce best effort.   - Continue AZA 25 mg daily, continue tacrolimus (goal 8-10) and prednisone   - On Bactrim 3 times/week  - Continue PT    2. Right main bronchial stenosis s/p ligation: with multiple bronchs in the OR, last included debulking/dilation of the RERE. No new complaints, only using her nebs PRN.   - Follow up with IP as needed    3. Congestive hepatopathy with fibrosis: complicated by ascites secondary to portal hypertension. Chronic elevation of alk phos. Fluid optimization with dialysis. Follows with Dr. Denise.    4. Left-sided aspergillus empyema: noted 10/8/19. CT scan on 7/17/20 with increased mass-like density in LLL area s/p needle aspiration (8/20) with Aspergillus fumigatus on cultures s/p intrapleural bead placement (amphoterecin, 11/20). S/p chest tube drainage in May 2021. Likely with be on posaconazole for life per ID.   - Continue posaconazole  - Added on level to today    5. ESRD on  iHD (since 10/2019):  HD: BP elevated today at 174/83. Continues now on iHD M/W/F. Renal transplant listing on hold for her knee surgeries and likely for 6 months afterward.   - Continue metoprolol, renal VMI, phosphate binder    6. Osteoporosis: on DEXA from 5/19 with significant loss of bone in her lumbar spine and femur. Complicated by steroid use and fact that her activity is severely limited by her knee pain. Vitamin D level of 40. Has Endocrine appt in April.  - Continue supplementation    Chronic issues:  1. Paroxysmal AFib  2. Hypomagnesemia  3. Dermatomyositis with Raynauds    RTC: 2 months prior to her right knee surgery  Vaccinations: UTD with flu and bivalent covid vaccine  Preventative: colonoscopy due, will talk to primary; mammogram on Monday; DEXA > May 2024; following with Alexia Chow PA-C  Pulmonary, Allergy, Critical Care and Sleep Medicine        Interval History:     Left knee is much better after surgery, PT is going well, endurance and balance has improved. Still using a wheeled walker at home and in the [facility]. Right knee is scheduled for April surgery. Will continue PT. Had covid after Thanksgiving, cough is still above baseline, but better. Cough is still productive of clear phelgm in the morning. No congestion or tightness, started humidifier. No new or unexpected shortness of breath. No Gi complaints, stools are stable.           Review of Systems:   Please see HPI, otherwise the complete 10 point ROS is negative.           Past Medical and Surgical History:     Past Medical History:   Diagnosis Date     Acute on chronic respiratory failure with hypoxia (H) 02/21/2018     Anisocoria      Antisynthetase syndrome (H) 2014     Anxiety      Aspergillus (H)      Aspergillus pneumonia (H) 11/20/2020     Benign essential hypertension      C. difficile colitis      Cytomegalovirus (CMV) viremia (H)      Dermatomyositis (H)      Dysplasia of cervix, low grade (ESTRADA 1)      EBV (Franklin-Barr  virus) viremia      ESRD (end stage renal disease) on dialysis (H)      ILD (interstitial lung disease) (H)      Lung replaced by transplant (H)      Osteopenia      Paroxysmal atrial fibrillation (H)      Pneumocystis jiroveci pneumonia (H)      PONV (postoperative nausea and vomiting)      Post-menopause      Pulmonary hypertension (H)      Raynaud's disease      Seronegative rheumatoid arthritis (H)      Past Surgical History:   Procedure Laterality Date     BRONCHOSCOPY (RIGID OR FLEXIBLE), DIAGNOSTIC N/A 4/10/2018    Procedure: COMBINED BRONCHOSCOPY (RIGID OR FLEXIBLE), LAVAGE;;  Surgeon: Mariposa Donohue MD;  [facility]     BRONCHOSCOPY (RIGID OR FLEXIBLE), DIAGNOSTIC N/A 12/23/2020    Procedure: BRONCHOSCOPY, WITH BRONCHOALVEOLAR LAVAGE;  Surgeon: Uri Bass MD;  [facility]     BRONCHOSCOPY (RIGID OR FLEXIBLE), DIAGNOSTIC N/A 5/26/2022    Procedure: BRONCHOSCOPY, WITH BRONCHOALVEOLAR LAVAGE;  Surgeon: Uri Bass MD;  [facility]     BRONCHOSCOPY (RIGID OR FLEXIBLE), DILATE BRONCHUS / TRACHEA N/A 10/11/2018    Procedure: BRONCHOSCOPY (RIGID OR FLEXIBLE), DILATE BRONCHUS / TRACHEA;  Flexible And Rigid Bronchoscopy and Dilation;  Surgeon: Wilber Lin MD;  [facility]     BRONCHOSCOPY FLEXIBLE N/A 3/13/2018    Procedure: BRONCHOSCOPY FLEXIBLE;  Flexible Bronchoscopy ;  Surgeon: Gissell Sanchez MD;  [facility]     BRONCHOSCOPY FLEXIBLE N/A 5/9/2018    Procedure: BRONCHOSCOPY FLEXIBLE;;  Surgeon: Wilber Lin MD;  [facility]     BRONCHOSCOPY FLEXIBLE AND RIGID N/A 9/10/2018    Procedure: BRONCHOSCOPY FLEXIBLE AND RIGID;  Flexible and Rigid Bronchoscopy with Balloon Dilation, tissue debulking with cryo, and Right mainstem bronchus stent placement;  Surgeon: Wilber Lin MD;  [facility]     BRONCHOSCOPY RIGID N/A 6/6/2018    Procedure: BRONCHOSCOPY RIGID;;  Surgeon: Lopez Macias MD;  [facility]     BRONCHOSCOPY,  DILATE BRONCHUS, STENT BRONCHUS, COMBINED N/A 6/11/2018    Procedure: COMBINED BRONCHOSCOPY, DILATE BRONCHUS, STENT BRONCHUS;  Flexible Bronchoscopy, Balloon Dilation, Bronchial Washings;  Surgeon: Wilber Lin MD;  [facility]     BRONCHOSCOPY, DILATE BRONCHUS, STENT BRONCHUS, COMBINED Right 7/10/2018    Procedure: COMBINED BRONCHOSCOPY, DILATE BRONCHUS, STENT BRONCHUS;  Flexible Bronchoscopy, Balloon Dilation, Bronchial Washings  ;  Surgeon: Wilber Lin MD;  [facility]     BRONCHOSCOPY, DILATE BRONCHUS, STENT BRONCHUS, COMBINED N/A 8/2/2018    Procedure: COMBINED BRONCHOSCOPY, DILATE BRONCHUS, STENT BRONCHUS;  Flexible Bronchoscopy, Bronchial Washings, Balloon Dilation;  Surgeon: Wilber Lin MD;  [facility]     BRONCHOSCOPY, DILATE BRONCHUS, STENT BRONCHUS, COMBINED N/A 8/20/2018    Procedure: COMBINED BRONCHOSCOPY, DILATE BRONCHUS, STENT BRONCHUS;  Flexible Bronchoscopy, Balloon Dilation;  Surgeon: Wilber Lin MD;  [facility]     BRONCHOSCOPY, DILATE BRONCHUS, STENT BRONCHUS, COMBINED N/A 10/29/2018    Procedure: Flexible Bronchoscopy, Balloon Dilation, Stent Revision, Airway Examination And Therapeutic Suctioning, Cyro Tumor Debulking;  Surgeon: Wilber Lin MD;  [facility]     BRONCHOSCOPY, DILATE BRONCHUS, STENT BRONCHUS, COMBINED N/A 11/20/2018    Procedure: Rigid Bronchoscopy, Stent Removal and dilitation;  Surgeon: Wilber Lin MD;  [facility]     BRONCHOSCOPY, DILATE BRONCHUS, STENT BRONCHUS, COMBINED N/A 12/14/2018    Procedure: Flexible And Rigid Bronchoscopy, Balloon Dilation, Bronchial Washing;  Surgeon: Wilber Lin MD;  [facility]     BRONCHOSCOPY, DILATE BRONCHUS, STENT BRONCHUS, COMBINED N/A 1/17/2019    Procedure: Flexible And Rigid Bronchoscopy, Balloon Dilation.;  Surgeon: Wilber Lin MD;  [facility]     BRONCHOSCOPY, DILATE BRONCHUS, STENT BRONCHUS, COMBINED N/A 3/7/2019     Procedure: Flexible and Rigid Bronchoscopy, Bronchial Washing, Balloon Dilation;  Surgeon: Wilber Lin MD;  [facility]     BRONCHOSCOPY, DILATE BRONCHUS, STENT BRONCHUS, COMBINED N/A 6/6/2019    Procedure: Rigid and Flexible Bronchoscopy, Balloon Dilation;  Surgeon: Wilber Lin MD;  [facility]     BRONCHOSCOPY, DILATE BRONCHUS, STENT BRONCHUS, COMBINED N/A 10/11/2019    Procedure: Flexible and Rigid Bronchoscopy, Balloon Dilation, Bronchoalveolar Lagave;  Surgeon: Wilber Lin MD;  [facility]     BRONCHOSCOPY, DILATE BRONCHUS, STENT BRONCHUS, COMBINED N/A 2/19/2021    Procedure: BRONCHOSCOPY, flexible, airway dilation, and bronchial wash;  Surgeon: Wilber Lin MD;  [facility]     BRONCHOSCOPY, DILATE BRONCHUS, STENT BRONCHUS, COMBINED N/A 4/9/2021    Procedure: BRONCHOSCOPY, flexible and rigid, Airway suctioning;  Surgeon: Mati Norris MD;  [facility]     CV RIGHT HEART CATH MEASUREMENTS RECORDED N/A 3/10/2020    Procedure: CV RIGHT HEART CATH;  Surgeon: Wai Garcia MD;  [facility]     ENT SURGERY      tonsillectomy as a child     ESOPHAGOSCOPY, GASTROSCOPY, DUODENOSCOPY (EGD), COMBINED N/A 10/29/2018    Procedure: COMBINED ESOPHAGOSCOPY, GASTROSCOPY, DUODENOSCOPY (EGD) with biopsies and polypectomy;  Surgeon: Chente Bloom MD;  [facility]     INSERT EXTRACORPORAL MEMBRANE OXYGENATOR ADULT (OUTSIDE OR) N/A 2/27/2018    Procedure: INSERT EXTRACORPORAL MEMBRANE OXYGENATOR ADULT (OUTSIDE OR);  INSERT EXTRACORPORAL MEMBRANE OXYGENATOR ADULT (OUTSIDE OR) ;  Surgeon: Toby Hernandez MD;  [facility]     IR CVC TUNNEL PLACEMENT > 5 YRS OF AGE  10/25/2019     IR DIALYSIS FISTULOGRAM LEFT  3/2/2021     IR DIALYSIS MECH THROMB, PTA  3/2/2021     IR FLUORO 0-1 HOUR  5/7/2021     IR GASTRO JEJUNOSTOMY TUBE PLACEMENT  2/16/2021     IR PARACENTESIS  1/8/2020     IR THORACENTESIS  9/13/2019      IR TRANSCATHETER BIOPSY  1/8/2020     LASER CO2 BRONCHOSCOPY N/A 4/30/2021    Procedure: Flexible and Rigid Bronchoscopy and Tissue Debulking with CO2 Laser Assistance;  Surgeon: Mati Norris MD;  [facility]     LASER CO2 BRONCHOSCOPY N/A 6/11/2021    Procedure: BRONCHOSCOPY, Flexible and Rigid Bronchoscopy, Tissue Debulking with cryo Assistance, airway dilation,;  Surgeon: Mati Norris MD;  [facility]     LASER CO2 BRONCHOSCOPY N/A 9/16/2021    Procedure: BRONCHOSCOPY, flexible and rigid, CO2 Laser Debulking;  Surgeon: Mati Norris MD;  [facility]     LASER CO2 BRONCHOSCOPY N/A 2/11/2022    Procedure: flexible, rigid bronchoscopy, tissue debulking, airway dilation, co2 laser, bronchoalveolar lavage;  Surgeon: Juana Baugh MD;  Location:  OR     no prior surgery       REMOVE EXTRACORPORAL MEMBRANE OXYGENATOR ADULT N/A 3/3/2018    Procedure: REMOVE EXTRACORPORAL MEMBRANE OXYGENATOR ADULT;  Removal of Right Femoral Venous and Right Axillary Arterial Extracorporeal Membrane Oxygenator;  Surgeon: Toby Hernandez MD;  Location:  OR     TRANSPLANT LUNG RECIPIENT SINGLE X2 Bilateral 3/1/2018    Procedure: TRANSPLANT LUNG RECIPIENT SINGLE X2;  Median Sternotomy, Extracorporeal Membrane Oxygenator, bilateral sequential lung transplant;  Surgeon: Toby Hernandez MD;  Location:  OR           Family History:     Family History   Problem Relation Age of Onset     Hypertension Mother      Arthritis Mother      Pancreatic Cancer Father      Diabetes Father             Social History:     Social History     Socioeconomic History     Marital status:      Spouse name: Not on file     Number of children: Not on file     Years of education: Not on file     Highest education level: Not on file   Occupational History     Not on file   Tobacco Use     Smoking status: Never     Smokeless tobacco: Never   Substance and Sexual Activity     Alcohol use: No     Alcohol/week: 1.0  standard drink     Types: 1 Glasses of wine per week     Drug use: No     Sexual activity: Not on file   Other Topics Concern     Parent/sibling w/ CABG, MI or angioplasty before 65F 55M? No   Social History Narrative    3/6/2019 - Lives with . Has three daughters. Four grandchildren (two ). No pets. Travelled previously to [place]. Has visited [place] several times.      Social Determinants of Health     Financial Resource Strain: Not on file   Food Insecurity: Not on file   Transportation Needs: Not on file   Physical Activity: Not on file   Stress: Not on file   Social Connections: Not on file   Intimate Partner Violence: Not on file   Housing Stability: Not on file            Medications:     Current Outpatient Medications   Medication     albuterol (PROVENTIL) (2.5 MG/3ML) 0.083% neb solution     budesonide (PULMICORT) 0.5 MG/2ML neb solution     acetaminophen (TYLENOL) 325 MG tablet     azaTHIOprine (IMURAN) 50 MG tablet     calcium acetate (PHOSLO) 667 MG CAPS capsule     Magnesium Cl-Calcium Carbonate (SLOW-MAG) 71.5-119 MG TBEC     metoprolol tartrate (LOPRESSOR) 25 MG tablet     multivitamin RENAL (RENAVITE RX/NEPHROVITE) 1 MG tablet     pantoprazole (PROTONIX) 40 MG EC tablet     posaconazole (NOXAFIL) 100 MG EC tablet     predniSONE (DELTASONE) 2.5 MG tablet     predniSONE (DELTASONE) 5 MG tablet     sulfamethoxazole-trimethoprim (BACTRIM) 400-80 MG tablet     tacrolimus (GENERIC EQUIVALENT) 0.5 MG capsule     No current facility-administered medications for this visit.            Physical Exam:   BP (!) 174/83   Pulse 107   LMP 2014 (Exact Date)   SpO2 97%     GENERAL: alert, NAD  HEENT: NCAT, EOMI, no scleral icterus, oral mucosa moist and without lesions  Neck: no cervical or supraclavicular adenopathy  Lungs: moderate airflow, bibasilar fine crackles and squeaks  CV: irregular rhythm, S1S2, no murmurs noted  Abdomen: normoactive BS, soft  Lymph: no edema  Neuro: AAO X  3, CN 2-12 grossly intact  Psychiatric: normal affect, good eye contact  Skin: no rash, jaundice or lesions on limited exam  MSK: left knee in brace         Data:   All laboratory and imaging data reviewed.      Recent Results (from the past 168 hour(s))   Basic metabolic panel    Collection Time: 02/09/23 10:34 AM   Result Value Ref Range    Sodium 138 136 - 145 mmol/L    Potassium 4.2 3.4 - 5.3 mmol/L    Chloride 98 98 - 107 mmol/L    Carbon Dioxide (CO2) 30 (H) 22 - 29 mmol/L    Anion Gap 10 7 - 15 mmol/L    Urea Nitrogen 41.7 (H) 8.0 - 23.0 mg/dL    Creatinine 3.70 (H) 0.51 - 0.95 mg/dL    Calcium 9.6 8.8 - 10.2 mg/dL    Glucose 115 (H) 70 - 99 mg/dL    GFR Estimate 13 (L) >60 mL/min/1.73m2   CBC with platelets    Collection Time: 02/09/23 10:34 AM   Result Value Ref Range    WBC Count 6.0 4.0 - 11.0 10e3/uL    RBC Count 3.46 (L) 3.80 - 5.20 10e6/uL    Hemoglobin 11.0 (L) 11.7 - 15.7 g/dL    Hematocrit 34.9 (L) 35.0 - 47.0 %     (H) 78 - 100 fL    MCH 31.8 26.5 - 33.0 pg    MCHC 31.5 31.5 - 36.5 g/dL    RDW 16.9 (H) 10.0 - 15.0 %    Platelet Count 231 150 - 450 10e3/uL   Magnesium    Collection Time: 02/09/23 10:34 AM   Result Value Ref Range    Magnesium 1.9 1.7 - 2.3 mg/dL   Hepatic function panel    Collection Time: 02/09/23 10:34 AM   Result Value Ref Range    Protein Total 7.7 6.4 - 8.3 g/dL    Albumin 4.1 3.5 - 5.2 g/dL    Bilirubin Total 0.5 <=1.2 mg/dL    Alkaline Phosphatase 322 (H) 35 - 104 U/L    AST 30 10 - 35 U/L    ALT 48 (H) 10 - 35 U/L    Bilirubin Direct 0.25 0.00 - 0.30 mg/dL   General PFT Lab (Please always keep checked)    Collection Time: 02/09/23 10:40 AM   Result Value Ref Range    FVC-Pred 2.91 L    FVC-Pre 1.50 L    FVC-%Pred-Pre 51 %    FEV1-Pre 1.20 L    FEV1-%Pred-Pre 51 %    FEV1FVC-Pred 80 %    FEV1FVC-Pre 80 %    FEFMax-Pred 6.29 L/sec    FEFMax-Pre 3.36 L/sec    FEFMax-%Pred-Pre 53 %    FEF2575-Pred 2.16 L/sec    FEF2575-Pre 1.21 L/sec    SLH7801-%Pred-Pre 56 %     ExpTime-Pre 4.79 sec    FIFMax-Pre 2.92 L/sec    FEV1FEV6-Pred 81 %    FEV1FEV6-Pre 84 %     PFT interpretation:  Maneuver: valid, but did not meet ATS guidelines

## 2023-02-09 ENCOUNTER — LAB (OUTPATIENT)
Dept: LAB | Facility: CLINIC | Age: 63
End: 2023-02-09
Attending: PHYSICIAN ASSISTANT
Payer: MEDICARE

## 2023-02-09 ENCOUNTER — ANCILLARY PROCEDURE (OUTPATIENT)
Dept: GENERAL RADIOLOGY | Facility: CLINIC | Age: 63
End: 2023-02-09
Attending: PHYSICIAN ASSISTANT
Payer: MEDICARE

## 2023-02-09 ENCOUNTER — OFFICE VISIT (OUTPATIENT)
Dept: PULMONOLOGY | Facility: CLINIC | Age: 63
End: 2023-02-09
Attending: PHYSICIAN ASSISTANT
Payer: MEDICARE

## 2023-02-09 VITALS — HEART RATE: 107 BPM | SYSTOLIC BLOOD PRESSURE: 174 MMHG | OXYGEN SATURATION: 97 % | DIASTOLIC BLOOD PRESSURE: 83 MMHG

## 2023-02-09 DIAGNOSIS — D84.9 IMMUNOSUPPRESSED STATUS (H): ICD-10-CM

## 2023-02-09 DIAGNOSIS — E83.42 HYPOMAGNESEMIA: ICD-10-CM

## 2023-02-09 DIAGNOSIS — Z94.2 S/P LUNG TRANSPLANT (H): ICD-10-CM

## 2023-02-09 DIAGNOSIS — Z94.2 LUNG REPLACED BY TRANSPLANT (H): Primary | ICD-10-CM

## 2023-02-09 DIAGNOSIS — Z94.2 LUNG REPLACED BY TRANSPLANT (H): ICD-10-CM

## 2023-02-09 DIAGNOSIS — Z79.52 LONG TERM (CURRENT) USE OF SYSTEMIC STEROIDS: ICD-10-CM

## 2023-02-09 LAB
ALBUMIN SERPL BCG-MCNC: 4.1 G/DL (ref 3.5–5.2)
ALP SERPL-CCNC: 322 U/L (ref 35–104)
ALT SERPL W P-5'-P-CCNC: 48 U/L (ref 10–35)
ANION GAP SERPL CALCULATED.3IONS-SCNC: 10 MMOL/L (ref 7–15)
AST SERPL W P-5'-P-CCNC: 30 U/L (ref 10–35)
BILIRUB DIRECT SERPL-MCNC: 0.25 MG/DL (ref 0–0.3)
BILIRUB SERPL-MCNC: 0.5 MG/DL
BUN SERPL-MCNC: 41.7 MG/DL (ref 8–23)
CALCIUM SERPL-MCNC: 9.6 MG/DL (ref 8.8–10.2)
CHLORIDE SERPL-SCNC: 98 MMOL/L (ref 98–107)
CREAT SERPL-MCNC: 3.7 MG/DL (ref 0.51–0.95)
DEPRECATED HCO3 PLAS-SCNC: 30 MMOL/L (ref 22–29)
ERYTHROCYTE [DISTWIDTH] IN BLOOD BY AUTOMATED COUNT: 16.9 % (ref 10–15)
EXPTIME-PRE: 4.79 SEC
FEF2575-%PRED-PRE: 56 %
FEF2575-PRE: 1.21 L/SEC
FEF2575-PRED: 2.16 L/SEC
FEFMAX-%PRED-PRE: 53 %
FEFMAX-PRE: 3.36 L/SEC
FEFMAX-PRED: 6.29 L/SEC
FEV1-%PRED-PRE: 51 %
FEV1-PRE: 1.2 L
FEV1FEV6-PRE: 84 %
FEV1FEV6-PRED: 81 %
FEV1FVC-PRE: 80 %
FEV1FVC-PRED: 80 %
FIFMAX-PRE: 2.92 L/SEC
FVC-%PRED-PRE: 51 %
FVC-PRE: 1.5 L
FVC-PRED: 2.91 L
GFR SERPL CREATININE-BSD FRML MDRD: 13 ML/MIN/1.73M2
GLUCOSE SERPL-MCNC: 115 MG/DL (ref 70–99)
HCT VFR BLD AUTO: 34.9 % (ref 35–47)
HGB BLD-MCNC: 11 G/DL (ref 11.7–15.7)
MAGNESIUM SERPL-MCNC: 1.9 MG/DL (ref 1.7–2.3)
MCH RBC QN AUTO: 31.8 PG (ref 26.5–33)
MCHC RBC AUTO-ENTMCNC: 31.5 G/DL (ref 31.5–36.5)
MCV RBC AUTO: 101 FL (ref 78–100)
PLATELET # BLD AUTO: 231 10E3/UL (ref 150–450)
POTASSIUM SERPL-SCNC: 4.2 MMOL/L (ref 3.4–5.3)
PROT SERPL-MCNC: 7.7 G/DL (ref 6.4–8.3)
RBC # BLD AUTO: 3.46 10E6/UL (ref 3.8–5.2)
SODIUM SERPL-SCNC: 138 MMOL/L (ref 136–145)
TACROLIMUS BLD-MCNC: 7.6 UG/L (ref 5–15)
TME LAST DOSE: NORMAL H
TME LAST DOSE: NORMAL H
WBC # BLD AUTO: 6 10E3/UL (ref 4–11)

## 2023-02-09 PROCEDURE — 82248 BILIRUBIN DIRECT: CPT | Performed by: PHYSICIAN ASSISTANT

## 2023-02-09 PROCEDURE — 99214 OFFICE O/P EST MOD 30 MIN: CPT | Mod: 25 | Performed by: PHYSICIAN ASSISTANT

## 2023-02-09 PROCEDURE — 86832 HLA CLASS I HIGH DEFIN QUAL: CPT | Performed by: PHYSICIAN ASSISTANT

## 2023-02-09 PROCEDURE — 94375 RESPIRATORY FLOW VOLUME LOOP: CPT | Performed by: PHYSICIAN ASSISTANT

## 2023-02-09 PROCEDURE — 80187 DRUG ASSAY POSACONAZOLE: CPT | Performed by: PHYSICIAN ASSISTANT

## 2023-02-09 PROCEDURE — 85027 COMPLETE CBC AUTOMATED: CPT | Performed by: PATHOLOGY

## 2023-02-09 PROCEDURE — 83735 ASSAY OF MAGNESIUM: CPT | Performed by: PATHOLOGY

## 2023-02-09 PROCEDURE — 87799 DETECT AGENT NOS DNA QUANT: CPT | Performed by: PHYSICIAN ASSISTANT

## 2023-02-09 PROCEDURE — 80197 ASSAY OF TACROLIMUS: CPT | Performed by: PHYSICIAN ASSISTANT

## 2023-02-09 PROCEDURE — 71046 X-RAY EXAM CHEST 2 VIEWS: CPT | Mod: GC | Performed by: RADIOLOGY

## 2023-02-09 PROCEDURE — 80053 COMPREHEN METABOLIC PANEL: CPT | Performed by: PATHOLOGY

## 2023-02-09 PROCEDURE — G0463 HOSPITAL OUTPT CLINIC VISIT: HCPCS | Performed by: PHYSICIAN ASSISTANT

## 2023-02-09 PROCEDURE — 36415 COLL VENOUS BLD VENIPUNCTURE: CPT | Performed by: PATHOLOGY

## 2023-02-09 PROCEDURE — 86833 HLA CLASS II HIGH DEFIN QUAL: CPT | Performed by: PHYSICIAN ASSISTANT

## 2023-02-09 RX ORDER — ALBUTEROL SULFATE 0.83 MG/ML
2.5 SOLUTION RESPIRATORY (INHALATION) EVERY 6 HOURS PRN
Qty: 360 ML | Refills: 4
Start: 2023-02-09 | End: 2023-01-01

## 2023-02-09 RX ORDER — BUDESONIDE 0.5 MG/2ML
0.5 INHALANT ORAL DAILY PRN
Qty: 60 ML | Refills: 11 | Status: ON HOLD
Start: 2023-02-09 | End: 2023-01-01

## 2023-02-09 NOTE — NURSING NOTE
Chief Complaint   Patient presents with     Transplant     Ltx f/u     Vitals were taken and medications were reconciled.     KVNG Gabriel

## 2023-02-09 NOTE — PATIENT INSTRUCTIONS
Patient Instructions  1. Continue to hydrate with 60-70 oz fluids daily.  2. Continue to exercise daily or most days of the week.  3. Follow up with your primary care provider for annual gender health maintenance procedures.  4. Follow up with colonoscopy schedule. Do this closer to home, due anytime.   5. Follow up with annual dermatology visits.  6. It doesn't seem like the COVID vaccine is working well in lung transplant patients. A number of lung transplant patients have gotten sick with COVID even after receiving the vaccines. Based on our recent experience, it can be life-threatening to get COVID even after being vaccinated. Please continue to act like you did not get the COVID vaccine - social distancing, wearing a mask, good hand hygiene, etc. If the people around you are vaccinated, it will help reduce the risk of you getting COVID. All members of your household should be vaccinated.  7. Do your incentive spirometer a few times a day to help keep things open.    Next transplant clinic appointment: 2 months with CXR, labs, and PFTs  Next lab draw: monthly

## 2023-02-09 NOTE — NURSING NOTE
Transplant Coordinator Note    Reason for visit: Post lung transplant follow up visit   Coordinator: Present   Caregiver: Pt's significant other, Ranjan    Sirigen concerns addressed today:  1. R knee replacement in April. Doing PT for L knee. Pt going well.   2. Resp: coughs more than she did pre-COVID. Clear sputum.   3. On hold for kidney transplant until after knee replacements are done, may have to wait 6 months after the next replacement. PFTs slightly down. Feels like she doesn't have as much endurance as before.   4. Fistulagram a week ago, noticing some swelling in L arm/breast.     Activity/rehab: Uses a walker with activity - will work with PT after surgery  Oxygen needs: Room air  Pain management/RX: Denies  High risk donor: Yes  CMV status: D+/R+  DVT/PE: H/o DVT  Post op AFIB/follow up with EP: One time occurrence of a-fib  PJP prophylactic: Bactrim     COVID:  1. COVID-19 infection (yes/no, date of most recent positive test):   2. Status/instructions given about COVID-19 vaccine:      Pt Education: medications (use/dose/side effects), how/when to call coordinator, frequency of labs, s/s of infection/rejection, call prior to starting any new medications, lab/vital sign book     Health Maintenance:     Last colonoscopy: 12/2012    Next colonoscopy due:     Dermatology:     Vaccinations this visit: None    Labs, CXR, PFTs reviewed with patient  Medication record reviewed and reconciled  Questions and concerns addressed    Patient Instructions  1. Continue to hydrate with 60-70 oz fluids daily.  2. Continue to exercise daily or most days of the week.  3. Follow up with your primary care provider for annual gender health maintenance procedures.  4. Follow up with colonoscopy schedule. Do this closer to home, due anytime.   5. Follow up with annual dermatology visits.  6. It doesn't seem like the COVID vaccine is working well in lung transplant patients. A number of lung transplant patients have gotten sick  with COVID even after receiving the vaccines. Based on our recent experience, it can be life-threatening to get COVID even after being vaccinated. Please continue to act like you did not get the COVID vaccine - social distancing, wearing a mask, good hand hygiene, etc. If the people around you are vaccinated, it will help reduce the risk of you getting COVID. All members of your household should be vaccinated.  7. Do your incentive spirometer a few times a day to help keep things open.    Next transplant clinic appointment: 2 months with CXR, labs, and PFTs  Next lab draw: monthly    AVS printed at time of check out

## 2023-02-09 NOTE — LETTER
2/9/2023         RE: Kecia Blue  [address] [place] [address]        Dear Colleague,    Thank you for referring your patient, Kecia Blue, to the [facility] [place]. Please see a copy of my visit note below.    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 She had covid back in November 2022 and still has a slight cough above baseline.     1. Bilateral lung transplant: since covid, feels like she has improved considerably, but still has a mainly dry cough above her baseline. Has been working with PT since her knee surgery and does feel like her endurance has improved. Sating 97% on room air. DSA 5/26/22 and CMV 1/13 negative. CXR reviewed by me with stable basilar and perihilar opacities. PFTs did not meet ATS guidelines today, unable to reproduce best effort.   - Continue AZA 25 mg daily, continue tacrolimus (goal 8-10) and prednisone   - On Bactrim 3 times/week  - Continue PT    2. Right main bronchial stenosis s/p ligation: with multiple bronchs in the OR, last included debulking/dilation of the RERE. No new complaints, only using her nebs PRN.   - Follow up with IP as needed    3. Congestive hepatopathy with fibrosis: complicated by ascites secondary to portal hypertension. Chronic elevation of alk phos. Fluid optimization with dialysis. Follows with Dr. Denise.    4. Left-sided aspergillus empyema: noted 10/8/19. CT scan on 7/17/20 with increased  mass-like density in LLL area s/p needle aspiration (8/20) with Aspergillus fumigatus on cultures s/p intrapleural bead placement (amphoterecin, 11/20). S/p chest tube drainage in May 2021. Likely with be on posaconazole for life per ID.   - Continue posaconazole  - Added on level to today    5. ESRD on iHD (since 10/2019):  HD: BP elevated today at 174/83. Continues now on iHD M/W/F. Renal transplant listing on hold for her knee surgeries and likely for 6 months afterward.   - Continue metoprolol, renal VMI, phosphate binder    6. Osteoporosis: on DEXA from 5/19 with significant loss of bone in her lumbar spine and femur. Complicated by steroid use and fact that her activity is severely limited by her knee pain. Vitamin D level of 40. Has Endocrine appt in April.  - Continue supplementation    Chronic issues:  1. Paroxysmal AFib  2. Hypomagnesemia  3. Dermatomyositis with Raynauds    RTC: 2 months prior to her right knee surgery  Vaccinations: UTD with flu and bivalent covid vaccine  Preventative: colonoscopy due, will talk to primary; mammogram on Monday; DEXA > May 2024; following with Alexia Chow PA-C  Pulmonary, Allergy, Critical Care and Sleep Medicine        Interval History:     Left knee is much better after surgery, PT is going well, endurance and balance has improved. Still using a wheeled walker at home and in the [facility]. Right knee is scheduled for April surgery. Will continue PT. Had covid after Thanksgiving, cough is still above baseline, but better. Cough is still productive of clear phelgm in the morning. No congestion or tightness, started humidifier. No new or unexpected shortness of breath. No Gi complaints, stools are stable.           Review of Systems:   Please see HPI, otherwise the complete 10 point ROS is negative.           Past Medical and Surgical History:     Past Medical History:   Diagnosis Date     Acute on chronic respiratory failure with hypoxia (H) 02/21/2018     Anisocoria       Antisynthetase syndrome (H) 2014     Anxiety      Aspergillus (H)      Aspergillus pneumonia (H) 11/20/2020     Benign essential hypertension      C. difficile colitis      Cytomegalovirus (CMV) viremia (H)      Dermatomyositis (H)      Dysplasia of cervix, low grade (ESTRADA 1)      EBV (Franklin-Barr virus) viremia      ESRD (end stage renal disease) on dialysis (H)      ILD (interstitial lung disease) (H)      Lung replaced by transplant (H)      Osteopenia      Paroxysmal atrial fibrillation (H)      Pneumocystis jiroveci pneumonia (H)      PONV (postoperative nausea and vomiting)      Post-menopause      Pulmonary hypertension (H)      Raynaud's disease      Seronegative rheumatoid arthritis (H)      Past Surgical History:   Procedure Laterality Date     BRONCHOSCOPY (RIGID OR FLEXIBLE), DIAGNOSTIC N/A 4/10/2018    Procedure: COMBINED BRONCHOSCOPY (RIGID OR FLEXIBLE), LAVAGE;;  Surgeon: Mariposa Donohue MD;  [facility]     BRONCHOSCOPY (RIGID OR FLEXIBLE), DIAGNOSTIC N/A 12/23/2020    Procedure: BRONCHOSCOPY, WITH BRONCHOALVEOLAR LAVAGE;  Surgeon: Uri Bass MD;  [facility]     BRONCHOSCOPY (RIGID OR FLEXIBLE), DIAGNOSTIC N/A 5/26/2022    Procedure: BRONCHOSCOPY, WITH BRONCHOALVEOLAR LAVAGE;  Surgeon: Uri Bass MD;  [facility]     BRONCHOSCOPY (RIGID OR FLEXIBLE), DILATE BRONCHUS / TRACHEA N/A 10/11/2018    Procedure: BRONCHOSCOPY (RIGID OR FLEXIBLE), DILATE BRONCHUS / TRACHEA;  Flexible And Rigid Bronchoscopy and Dilation;  Surgeon: Wilber Lin MD;  [facility]     BRONCHOSCOPY FLEXIBLE N/A 3/13/2018    Procedure: BRONCHOSCOPY FLEXIBLE;  Flexible Bronchoscopy ;  Surgeon: Gissell Sanchez MD;  [facility]     BRONCHOSCOPY FLEXIBLE N/A 5/9/2018    Procedure: BRONCHOSCOPY FLEXIBLE;;  Surgeon: Wilber Lin MD;  [facility]     BRONCHOSCOPY FLEXIBLE AND RIGID N/A 9/10/2018    Procedure: BRONCHOSCOPY FLEXIBLE AND RIGID;  Flexible and Rigid  Bronchoscopy with Balloon Dilation, tissue debulking with cryo, and Right mainstem bronchus stent placement;  Surgeon: Wilber Lin MD;  [facility]     BRONCHOSCOPY RIGID N/A 6/6/2018    Procedure: BRONCHOSCOPY RIGID;;  Surgeon: Lopez Macias MD;  [facility]     BRONCHOSCOPY, DILATE BRONCHUS, STENT BRONCHUS, COMBINED N/A 6/11/2018    Procedure: COMBINED BRONCHOSCOPY, DILATE BRONCHUS, STENT BRONCHUS;  Flexible Bronchoscopy, Balloon Dilation, Bronchial Washings;  Surgeon: Wilber Lin MD;  [facility]     BRONCHOSCOPY, DILATE BRONCHUS, STENT BRONCHUS, COMBINED Right 7/10/2018    Procedure: COMBINED BRONCHOSCOPY, DILATE BRONCHUS, STENT BRONCHUS;  Flexible Bronchoscopy, Balloon Dilation, Bronchial Washings  ;  Surgeon: Wilber Lin MD;  [facility]     BRONCHOSCOPY, DILATE BRONCHUS, STENT BRONCHUS, COMBINED N/A 8/2/2018    Procedure: COMBINED BRONCHOSCOPY, DILATE BRONCHUS, STENT BRONCHUS;  Flexible Bronchoscopy, Bronchial Washings, Balloon Dilation;  Surgeon: Wilber Lin MD;  [facility]     BRONCHOSCOPY, DILATE BRONCHUS, STENT BRONCHUS, COMBINED N/A 8/20/2018    Procedure: COMBINED BRONCHOSCOPY, DILATE BRONCHUS, STENT BRONCHUS;  Flexible Bronchoscopy, Balloon Dilation;  Surgeon: Wilber Lin MD;  [facility]     BRONCHOSCOPY, DILATE BRONCHUS, STENT BRONCHUS, COMBINED N/A 10/29/2018    Procedure: Flexible Bronchoscopy, Balloon Dilation, Stent Revision, Airway Examination And Therapeutic Suctioning, Cyro Tumor Debulking;  Surgeon: Wilber Lin MD;  [facility]     BRONCHOSCOPY, DILATE BRONCHUS, STENT BRONCHUS, COMBINED N/A 11/20/2018    Procedure: Rigid Bronchoscopy, Stent Removal and dilitation;  Surgeon: Wilber Lin MD;  [facility]     BRONCHOSCOPY, DILATE BRONCHUS, STENT BRONCHUS, COMBINED N/A 12/14/2018    Procedure: Flexible And Rigid Bronchoscopy, Balloon Dilation, Bronchial Washing;  Surgeon:  Wilber Lin MD;  [facility]     BRONCHOSCOPY, DILATE BRONCHUS, STENT BRONCHUS, COMBINED N/A 1/17/2019    Procedure: Flexible And Rigid Bronchoscopy, Balloon Dilation.;  Surgeon: Wilber Lin MD;  [facility]     BRONCHOSCOPY, DILATE BRONCHUS, STENT BRONCHUS, COMBINED N/A 3/7/2019    Procedure: Flexible and Rigid Bronchoscopy, Bronchial Washing, Balloon Dilation;  Surgeon: Wilber Lin MD;  [facility]     BRONCHOSCOPY, DILATE BRONCHUS, STENT BRONCHUS, COMBINED N/A 6/6/2019    Procedure: Rigid and Flexible Bronchoscopy, Balloon Dilation;  Surgeon: Wilber Lin MD;  [facility]     BRONCHOSCOPY, DILATE BRONCHUS, STENT BRONCHUS, COMBINED N/A 10/11/2019    Procedure: Flexible and Rigid Bronchoscopy, Balloon Dilation, Bronchoalveolar Lagave;  Surgeon: Wilber Lin MD;  [facility]     BRONCHOSCOPY, DILATE BRONCHUS, STENT BRONCHUS, COMBINED N/A 2/19/2021    Procedure: BRONCHOSCOPY, flexible, airway dilation, and bronchial wash;  Surgeon: Wilber Lin MD;  [facility]     BRONCHOSCOPY, DILATE BRONCHUS, STENT BRONCHUS, COMBINED N/A 4/9/2021    Procedure: BRONCHOSCOPY, flexible and rigid, Airway suctioning;  Surgeon: Mati Norris MD;  [facility]     CV RIGHT HEART CATH MEASUREMENTS RECORDED N/A 3/10/2020    Procedure: CV RIGHT HEART CATH;  Surgeon: Wai Garcia MD;  [facility]     ENT SURGERY      tonsillectomy as a child     ESOPHAGOSCOPY, GASTROSCOPY, DUODENOSCOPY (EGD), COMBINED N/A 10/29/2018    Procedure: COMBINED ESOPHAGOSCOPY, GASTROSCOPY, DUODENOSCOPY (EGD) with biopsies and polypectomy;  Surgeon: Chente Bloom MD;  [facility]     INSERT EXTRACORPORAL MEMBRANE OXYGENATOR ADULT (OUTSIDE OR) N/A 2/27/2018    Procedure: INSERT EXTRACORPORAL MEMBRANE OXYGENATOR ADULT (OUTSIDE OR);  INSERT EXTRACORPORAL MEMBRANE OXYGENATOR ADULT (OUTSIDE OR) ;  Surgeon: Toby Hernandez,  MD;  [facility]     IR CVC TUNNEL PLACEMENT > 5 YRS OF AGE  10/25/2019     IR DIALYSIS FISTULOGRAM LEFT  3/2/2021     IR DIALYSIS MECH THROMB, PTA  3/2/2021     IR FLUORO 0-1 HOUR  5/7/2021     IR GASTRO JEJUNOSTOMY TUBE PLACEMENT  2/16/2021     IR PARACENTESIS  1/8/2020     IR THORACENTESIS  9/13/2019     IR TRANSCATHETER BIOPSY  1/8/2020     LASER CO2 BRONCHOSCOPY N/A 4/30/2021    Procedure: Flexible and Rigid Bronchoscopy and Tissue Debulking with CO2 Laser Assistance;  Surgeon: Mati Norris MD;  [facility]     LASER CO2 BRONCHOSCOPY N/A 6/11/2021    Procedure: BRONCHOSCOPY, Flexible and Rigid Bronchoscopy, Tissue Debulking with cryo Assistance, airway dilation,;  Surgeon: Mati Norris MD;  [facility]     LASER CO2 BRONCHOSCOPY N/A 9/16/2021    Procedure: BRONCHOSCOPY, flexible and rigid, CO2 Laser Debulking;  Surgeon: Mati Norris MD;  [facility]     LASER CO2 BRONCHOSCOPY N/A 2/11/2022    Procedure: flexible, rigid bronchoscopy, tissue debulking, airway dilation, co2 laser, bronchoalveolar lavage;  Surgeon: Juana Baugh MD;  [facility]     no prior surgery       REMOVE EXTRACORPORAL MEMBRANE OXYGENATOR ADULT N/A 3/3/2018    Procedure: REMOVE EXTRACORPORAL MEMBRANE OXYGENATOR ADULT;  Removal of Right Femoral Venous and Right Axillary Arterial Extracorporeal Membrane Oxygenator;  Surgeon: Toby Hernandez MD;  [facility]     TRANSPLANT LUNG RECIPIENT SINGLE X2 Bilateral 3/1/2018    Procedure: TRANSPLANT LUNG RECIPIENT SINGLE X2;  Median Sternotomy, Extracorporeal Membrane Oxygenator, bilateral sequential lung transplant;  Surgeon: Toby Hernandez MD;  [facility]           Family History:     Family History   Problem Relation Age of Onset     Hypertension Mother      Arthritis Mother      Pancreatic Cancer Father      Diabetes Father             Social History:     Social History     Socioeconomic History     Marital status:      Spouse  name: Not on file     Number of children: Not on file     Years of education: Not on file     Highest education level: Not on file   Occupational History     Not on file   Tobacco Use     Smoking status: Never     Smokeless tobacco: Never   Substance and Sexual Activity     Alcohol use: No     Alcohol/week: 1.0 standard drink     Types: 1 Glasses of wine per week     Drug use: No     Sexual activity: Not on file   Other Topics Concern     Parent/sibling w/ CABG, MI or angioplasty before 65F 55M? No   Social History Narrative    3/6/2019 - Lives with . Has three daughters. Four grandchildren (two ). No pets. Travelled previously to [facility]. Has visited [place] several times.      Social Determinants of Health     Financial Resource Strain: Not on file   Food Insecurity: Not on file   Transportation Needs: Not on file   Physical Activity: Not on file   Stress: Not on file   Social Connections: Not on file   Intimate Partner Violence: Not on file   Housing Stability: Not on file            Medications:     Current Outpatient Medications   Medication     albuterol (PROVENTIL) (2.5 MG/3ML) 0.083% neb solution     budesonide (PULMICORT) 0.5 MG/2ML neb solution     acetaminophen (TYLENOL) 325 MG tablet     azaTHIOprine (IMURAN) 50 MG tablet     calcium acetate (PHOSLO) 667 MG CAPS capsule     Magnesium Cl-Calcium Carbonate (SLOW-MAG) 71.5-119 MG TBEC     metoprolol tartrate (LOPRESSOR) 25 MG tablet     multivitamin RENAL (RENAVITE RX/NEPHROVITE) 1 MG tablet     pantoprazole (PROTONIX) 40 MG EC tablet     posaconazole (NOXAFIL) 100 MG EC tablet     predniSONE (DELTASONE) 2.5 MG tablet     predniSONE (DELTASONE) 5 MG tablet     sulfamethoxazole-trimethoprim (BACTRIM) 400-80 MG tablet     tacrolimus (GENERIC EQUIVALENT) 0.5 MG capsule     No current facility-administered medications for this visit.            Physical Exam:   BP (!) 174/83   Pulse 107   LMP 2014 (Exact Date)   SpO2 97%      GENERAL: alert, NAD  HEENT: NCAT, EOMI, no scleral icterus, oral mucosa moist and without lesions  Neck: no cervical or supraclavicular adenopathy  Lungs: moderate airflow, bibasilar fine crackles and squeaks  CV: irregular rhythm, S1S2, no murmurs noted  Abdomen: normoactive BS, soft  Lymph: no edema  Neuro: AAO X 3, CN 2-12 grossly intact  Psychiatric: normal affect, good eye contact  Skin: no rash, jaundice or lesions on limited exam  MSK: left knee in brace         Data:   All laboratory and imaging data reviewed.      Recent Results (from the past 168 hour(s))   Basic metabolic panel    Collection Time: 02/09/23 10:34 AM   Result Value Ref Range    Sodium 138 136 - 145 mmol/L    Potassium 4.2 3.4 - 5.3 mmol/L    Chloride 98 98 - 107 mmol/L    Carbon Dioxide (CO2) 30 (H) 22 - 29 mmol/L    Anion Gap 10 7 - 15 mmol/L    Urea Nitrogen 41.7 (H) 8.0 - 23.0 mg/dL    Creatinine 3.70 (H) 0.51 - 0.95 mg/dL    Calcium 9.6 8.8 - 10.2 mg/dL    Glucose 115 (H) 70 - 99 mg/dL    GFR Estimate 13 (L) >60 mL/min/1.73m2   CBC with platelets    Collection Time: 02/09/23 10:34 AM   Result Value Ref Range    WBC Count 6.0 4.0 - 11.0 10e3/uL    RBC Count 3.46 (L) 3.80 - 5.20 10e6/uL    Hemoglobin 11.0 (L) 11.7 - 15.7 g/dL    Hematocrit 34.9 (L) 35.0 - 47.0 %     (H) 78 - 100 fL    MCH 31.8 26.5 - 33.0 pg    MCHC 31.5 31.5 - 36.5 g/dL    RDW 16.9 (H) 10.0 - 15.0 %    Platelet Count 231 150 - 450 10e3/uL   Magnesium    Collection Time: 02/09/23 10:34 AM   Result Value Ref Range    Magnesium 1.9 1.7 - 2.3 mg/dL   Hepatic function panel    Collection Time: 02/09/23 10:34 AM   Result Value Ref Range    Protein Total 7.7 6.4 - 8.3 g/dL    Albumin 4.1 3.5 - 5.2 g/dL    Bilirubin Total 0.5 <=1.2 mg/dL    Alkaline Phosphatase 322 (H) 35 - 104 U/L    AST 30 10 - 35 U/L    ALT 48 (H) 10 - 35 U/L    Bilirubin Direct 0.25 0.00 - 0.30 mg/dL   General PFT Lab (Please always keep checked)    Collection Time: 02/09/23 10:40 AM   Result  Value Ref Range    FVC-Pred 2.91 L    FVC-Pre 1.50 L    FVC-%Pred-Pre 51 %    FEV1-Pre 1.20 L    FEV1-%Pred-Pre 51 %    FEV1FVC-Pred 80 %    FEV1FVC-Pre 80 %    FEFMax-Pred 6.29 L/sec    FEFMax-Pre 3.36 L/sec    FEFMax-%Pred-Pre 53 %    FEF2575-Pred 2.16 L/sec    FEF2575-Pre 1.21 L/sec    PAX8747-%Pred-Pre 56 %    ExpTime-Pre 4.79 sec    FIFMax-Pre 2.92 L/sec    FEV1FEV6-Pred 81 %    FEV1FEV6-Pre 84 %     PFT interpretation:  Maneuver: valid, but did not meet ATS guidelines      Again, thank you for allowing me to participate in the care of your patient.        Sincerely,        Ame Chow PA-C

## 2023-02-09 NOTE — RESULT ENCOUNTER NOTE
Armand Mcdonnell,   Your tacrolimus level was 7.6 at 12 hours on 2/9/23 which is within your goal range of 8-10. No dose change at this time. Please call the transplant office ([phone]) with any questions.   Thanks,   Mikayla

## 2023-02-09 NOTE — LETTER
Date:February 9, 2023      Patient was self referred, no letter generated. Do not send.        [facility] Health Information

## 2023-02-10 LAB
CMV DNA SPEC NAA+PROBE-ACNC: <137 IU/ML
CMV DNA SPEC NAA+PROBE-LOG#: <2.1 {LOG_COPIES}/ML
DONOR IDENTIFICATION: NORMAL
DSA COMMENTS: NORMAL
DSA PRESENT: NO
DSA TEST METHOD: NORMAL
EBV DNA COPIES/ML, INSTRUMENT: ABNORMAL COPIES/ML
EBV DNA SPEC NAA+PROBE-LOG#: 5.1 {LOG_COPIES}/ML
ORGAN: NORMAL
PROTOCOL CUTOFF: NORMAL
SA 1 CELL: NORMAL
SA 1 TEST METHOD: NORMAL
SA 2 CELL: NORMAL
SA 2 TEST METHOD: NORMAL
SA1 HI RISK ABY: NORMAL
SA1 MOD RISK ABY: NORMAL
SA2 HI RISK ABY: NORMAL
SA2 MOD RISK ABY: NORMAL
UNACCEPTABLE ANTIGENS: NORMAL
UNOS CPRA: 84
ZZZSA 1  COMMENTS: NORMAL
ZZZSA 2 COMMENTS: NORMAL

## 2023-02-11 LAB — POSACONAZOLE SERPL-MCNC: 1 UG/ML

## 2023-02-13 DIAGNOSIS — Z94.2 LUNG REPLACED BY TRANSPLANT (H): Primary | ICD-10-CM

## 2023-02-13 NOTE — RESULT ENCOUNTER NOTE
Per Ame, recheck EBV as soon as possible. Will mail kit to patient and send order to local lab. Reviewed with patient and she will make a lab appt as soon as she gets the kit in the mail.

## 2023-02-15 ENCOUNTER — TELEPHONE (OUTPATIENT)
Dept: PULMONOLOGY | Facility: CLINIC | Age: 63
End: 2023-02-15
Payer: MEDICARE

## 2023-02-15 NOTE — TELEPHONE ENCOUNTER
PA Initiation    Medication: Posaconazole- pa pending  Insurance Company: Medicare Blue RX - Phone [phone] Fax [phone]  Pharmacy Filling the Rx: [id number] - [id number] AT [facility]  Filling Pharmacy Phone:    Filling Pharmacy Fax:    Start Date: 2/15/2023

## 2023-02-16 NOTE — TELEPHONE ENCOUNTER
Prior Authorization Approval    Authorization Effective Date: 1/1/2023  Authorization Expiration Date: 8/16/2023  Medication: Posaconazole- pa approved  Approved Dose/Quantity: UD  Reference #:     Insurance Company: Medicare Blue RX - Phone [phone] Fax [phone]  Expected CoPay:       CoPay Card Available:      Foundation Assistance Needed:    Which Pharmacy is filling the prescription (Not needed for infusion/clinic administered): [facility] #[id number] - [address]  Pharmacy Notified: No  Patient Notified: No

## 2023-02-20 PROCEDURE — 87799 DETECT AGENT NOS DNA QUANT: CPT | Performed by: PHYSICIAN ASSISTANT

## 2023-02-21 ENCOUNTER — TELEPHONE (OUTPATIENT)
Dept: TRANSPLANT | Facility: CLINIC | Age: 63
End: 2023-02-21
Payer: MEDICARE

## 2023-02-21 ENCOUNTER — LAB (OUTPATIENT)
Dept: LAB | Facility: CLINIC | Age: 63
End: 2023-02-21
Payer: MEDICARE

## 2023-02-21 DIAGNOSIS — Z94.2 LUNG REPLACED BY TRANSPLANT (H): ICD-10-CM

## 2023-02-21 NOTE — TELEPHONE ENCOUNTER
Received call from lab at [facility] that EBV from outside lab was plasma instead of whole blood.  Unable to run lab.  Plan for pt to repeat labs at local lab.  Confirmed plan with pt and pt's lab regarding need for whole blood instead of plasma.

## 2023-02-21 NOTE — LETTER
PHYSICIAN ORDERS      DATE & TIME ISSUED: 2023 1:54 PM  PATIENT NAME: Kecia Blue   : 1960     [facility] MR# [if applicable]: [id number]     DIAGNOSIS:  Lung Transplant  Z94.2    EBV DNA quant (pt will provide  kit to send sample to [facility]. Kit will say for CMV, but okay to use for EBV too. Please label tube as EBV sample and collect as WHOLE BLOOD). Thanks!     Any questions please call: Mikayla     Please fax these results to [phone].        Ame Chow PA-C

## 2023-02-23 ASSESSMENT — ENCOUNTER SYMPTOMS: NEW SYMPTOMS OF CORONARY ARTERY DISEASE: 0

## 2023-03-01 ENCOUNTER — LAB (OUTPATIENT)
Dept: LAB | Facility: CLINIC | Age: 63
End: 2023-03-01

## 2023-03-01 DIAGNOSIS — Z94.2 LUNG REPLACED BY TRANSPLANT (H): ICD-10-CM

## 2023-03-01 PROCEDURE — 87799 DETECT AGENT NOS DNA QUANT: CPT | Performed by: PHYSICIAN ASSISTANT

## 2023-03-03 DIAGNOSIS — Z94.2 LUNG REPLACED BY TRANSPLANT (H): Primary | ICD-10-CM

## 2023-03-03 LAB
EBV DNA COPIES/ML, INSTRUMENT: ABNORMAL COPIES/ML
EBV DNA SPEC NAA+PROBE-LOG#: 4.9 {LOG_COPIES}/ML

## 2023-03-08 ENCOUNTER — TELEPHONE (OUTPATIENT)
Dept: TRANSPLANT | Facility: CLINIC | Age: 63
End: 2023-03-08
Payer: MEDICARE

## 2023-03-08 ENCOUNTER — LAB (OUTPATIENT)
Dept: LAB | Facility: CLINIC | Age: 63
End: 2023-03-08
Payer: MEDICARE

## 2023-03-08 DIAGNOSIS — Z94.2 S/P LUNG TRANSPLANT (H): ICD-10-CM

## 2023-03-08 PROCEDURE — 80197 ASSAY OF TACROLIMUS: CPT

## 2023-03-08 NOTE — TELEPHONE ENCOUNTER
DATE:  3/8/2023     TIME OF RECEIPT FROM LAB:  0946    ORDERING PROVIDER:     LAB TEST:  Alk Phos 218    LAB VALUE:  Creatinine 5.4    RESULTS GIVEN WITH READ-BACK TO (PROVIDER):  CHANTE CHANEL LPN    TIME LAB VALUE REPORTED TO PROVIDER:   0946

## 2023-03-09 ENCOUNTER — TELEPHONE (OUTPATIENT)
Dept: TRANSPLANT | Facility: CLINIC | Age: 63
End: 2023-03-09
Payer: MEDICARE

## 2023-03-09 LAB
TACROLIMUS BLD-MCNC: 11.7 UG/L (ref 5–15)
TME LAST DOSE: NORMAL H
TME LAST DOSE: NORMAL H

## 2023-03-09 NOTE — TELEPHONE ENCOUNTER
Tacrolimus level 11.7 at 11.75 hours, on 3/8/23.  Goal 8-10.   Current dose 0.5 mg in AM, 1 mg in PM (per pt report)    Dose changed to 0.5 mg in AM, 0.5 mg in PM   Recheck level in 7-10 days.    Discussed with pt.

## 2023-03-14 ENCOUNTER — VIRTUAL VISIT (OUTPATIENT)
Dept: GASTROENTEROLOGY | Facility: CLINIC | Age: 63
End: 2023-03-14
Attending: INTERNAL MEDICINE
Payer: MEDICARE

## 2023-03-14 DIAGNOSIS — R18.8 OTHER ASCITES: ICD-10-CM

## 2023-03-14 DIAGNOSIS — Z94.2 LUNG TRANSPLANT STATUS, BILATERAL (H): Primary | ICD-10-CM

## 2023-03-14 PROCEDURE — 99214 OFFICE O/P EST MOD 30 MIN: CPT | Mod: VID | Performed by: INTERNAL MEDICINE

## 2023-03-14 PROCEDURE — G0463 HOSPITAL OUTPT CLINIC VISIT: HCPCS | Mod: PN,GT | Performed by: INTERNAL MEDICINE

## 2023-03-14 ASSESSMENT — PAIN SCALES - GENERAL: PAINLEVEL: NO PAIN (0)

## 2023-03-14 NOTE — LETTER
3/14/2023         RE: Kecia Blue  [address] [place] [address]        Dear Colleague,    Thank you for referring your patient, Kecia Blue, to the [facility] [place]. Please see a copy of my visit note below.    Video-Visit Details    Type of service:  Video Visit    [facility] Hepatology    Follow-up Visit    CHIEF COMPLAINT AND REASON FOR THE VISIT:  New onset ascites.    CONSULTING HEALTHCARE PROVIDER:  Rosy Vu MD.    SUBJECTIVE:  Ms. Blue is a 62-year-old female with a history of hypertension, end-stage kidney disease, currently on dialysis, interstitial lung disease with antisynthetase syndrome who had bilateral lung transplantation in 03/2018.  We did see her here for new onset ascites thought to be related with portal hypertension.      We did a full workup here and this was compatible with portal hypertension as the SAAG was more than 1.1 and biopsies of the liver showed that she had congestive hepatopathy with moderate zone 3 pericellular fibrosis without bridging fibrosis.  The portal pressure has hepatic vein portal pressure gradients that were elevated.     Now, as her dialysis was optimized, she has no abdominal distention, according to her, and no edema.  She also denies any abdominal pain, nausea or vomiting.  She is having no dyspnea on exertion or chest pain.  She is breathing relatively good and she is having regular bowel movements with no blood.  She also gets her dialysis.  Ms. Blue in 10/2022 did have a left knee replacement and it is planned that she will have her right knee also replaced in October of this year.  She is also very compliant with her medications.      Otherwise, she had no recent infections.  She did get the COVID Pfizer brand vaccination and has done so far 4 doses.    Medical hx Surgical hx   Past Medical History:   Diagnosis Date     Acute on chronic respiratory failure with  hypoxia (H) 02/21/2018     Anisocoria      Antisynthetase syndrome (H) 2014     Anxiety      Aspergillus (H)      Aspergillus pneumonia (H) 11/20/2020     Benign essential hypertension      C. difficile colitis      Cytomegalovirus (CMV) viremia (H)      Dermatomyositis (H)      Dysplasia of cervix, low grade (ESTRADA 1)      EBV (Franklin-Barr virus) viremia      ESRD (end stage renal disease) on dialysis (H)      ILD (interstitial lung disease) (H)      Lung replaced by transplant (H)      Osteopenia      Paroxysmal atrial fibrillation (H)      Pneumocystis jiroveci pneumonia (H)      PONV (postoperative nausea and vomiting)      Post-menopause      Pulmonary hypertension (H)      Raynaud's disease      Seronegative rheumatoid arthritis (H)       Past Surgical History:   Procedure Laterality Date     BRONCHOSCOPY (RIGID OR FLEXIBLE), DIAGNOSTIC N/A 4/10/2018    Procedure: COMBINED BRONCHOSCOPY (RIGID OR FLEXIBLE), LAVAGE;;  Surgeon: Mariposa Donohue MD;  [facility]     BRONCHOSCOPY (RIGID OR FLEXIBLE), DIAGNOSTIC N/A 12/23/2020    Procedure: BRONCHOSCOPY, WITH BRONCHOALVEOLAR LAVAGE;  Surgeon: Uri Bass MD;  [facility]     BRONCHOSCOPY (RIGID OR FLEXIBLE), DIAGNOSTIC N/A 5/26/2022    Procedure: BRONCHOSCOPY, WITH BRONCHOALVEOLAR LAVAGE;  Surgeon: Uri Bass MD;  Location: [facility]     BRONCHOSCOPY (RIGID OR FLEXIBLE), DILATE BRONCHUS / TRACHEA N/A 10/11/2018    Procedure: BRONCHOSCOPY (RIGID OR FLEXIBLE), DILATE BRONCHUS / TRACHEA;  Flexible And Rigid Bronchoscopy and Dilation;  Surgeon: Wilber Lin MD;  [facility]     BRONCHOSCOPY FLEXIBLE N/A 3/13/2018    Procedure: BRONCHOSCOPY FLEXIBLE;  Flexible Bronchoscopy ;  Surgeon: Gissell Sanchez MD;  [facility]     BRONCHOSCOPY FLEXIBLE N/A 5/9/2018    Procedure: BRONCHOSCOPY FLEXIBLE;;  Surgeon: Wilber Lin MD;  [facility]     BRONCHOSCOPY FLEXIBLE AND RIGID N/A 9/10/2018    Procedure: BRONCHOSCOPY  FLEXIBLE AND RIGID;  Flexible and Rigid Bronchoscopy with Balloon Dilation, tissue debulking with cryo, and Right mainstem bronchus stent placement;  Surgeon: Wilber Lin MD;  [facility]     BRONCHOSCOPY RIGID N/A 6/6/2018    Procedure: BRONCHOSCOPY RIGID;;  Surgeon: Lopez Macias MD;  [facility]     BRONCHOSCOPY, DILATE BRONCHUS, STENT BRONCHUS, COMBINED N/A 6/11/2018    Procedure: COMBINED BRONCHOSCOPY, DILATE BRONCHUS, STENT BRONCHUS;  Flexible Bronchoscopy, Balloon Dilation, Bronchial Washings;  Surgeon: Wilber Lin MD;  [facility]     BRONCHOSCOPY, DILATE BRONCHUS, STENT BRONCHUS, COMBINED Right 7/10/2018    Procedure: COMBINED BRONCHOSCOPY, DILATE BRONCHUS, STENT BRONCHUS;  Flexible Bronchoscopy, Balloon Dilation, Bronchial Washings  ;  Surgeon: Wilber Lin MD;  [facility]     BRONCHOSCOPY, DILATE BRONCHUS, STENT BRONCHUS, COMBINED N/A 8/2/2018    Procedure: COMBINED BRONCHOSCOPY, DILATE BRONCHUS, STENT BRONCHUS;  Flexible Bronchoscopy, Bronchial Washings, Balloon Dilation;  Surgeon: Wilber Lin MD;  [facility]     BRONCHOSCOPY, DILATE BRONCHUS, STENT BRONCHUS, COMBINED N/A 8/20/2018    Procedure: COMBINED BRONCHOSCOPY, DILATE BRONCHUS, STENT BRONCHUS;  Flexible Bronchoscopy, Balloon Dilation;  Surgeon: Wilber Lin MD;  [facility]     BRONCHOSCOPY, DILATE BRONCHUS, STENT BRONCHUS, COMBINED N/A 10/29/2018    Procedure: Flexible Bronchoscopy, Balloon Dilation, Stent Revision, Airway Examination And Therapeutic Suctioning, Cyro Tumor Debulking;  Surgeon: Wilber Lin MD;  [facility]     BRONCHOSCOPY, DILATE BRONCHUS, STENT BRONCHUS, COMBINED N/A 11/20/2018    Procedure: Rigid Bronchoscopy, Stent Removal and dilitation;  Surgeon: Wilber Lin MD;  [facility]     BRONCHOSCOPY, DILATE BRONCHUS, STENT BRONCHUS, COMBINED N/A 12/14/2018    Procedure: Flexible And Rigid Bronchoscopy, Balloon  Dilation, Bronchial Washing;  Surgeon: Wilber Lin MD;  [facility]     BRONCHOSCOPY, DILATE BRONCHUS, STENT BRONCHUS, COMBINED N/A 1/17/2019    Procedure: Flexible And Rigid Bronchoscopy, Balloon Dilation.;  Surgeon: Wilber Lin MD;  [facility]     BRONCHOSCOPY, DILATE BRONCHUS, STENT BRONCHUS, COMBINED N/A 3/7/2019    Procedure: Flexible and Rigid Bronchoscopy, Bronchial Washing, Balloon Dilation;  Surgeon: Wilber Lin MD;  [facility]     BRONCHOSCOPY, DILATE BRONCHUS, STENT BRONCHUS, COMBINED N/A 6/6/2019    Procedure: Rigid and Flexible Bronchoscopy, Balloon Dilation;  Surgeon: Wilber Lin MD;  [facility]     BRONCHOSCOPY, DILATE BRONCHUS, STENT BRONCHUS, COMBINED N/A 10/11/2019    Procedure: Flexible and Rigid Bronchoscopy, Balloon Dilation, Bronchoalveolar Lagave;  Surgeon: Wilber Lin MD;  [facility]     BRONCHOSCOPY, DILATE BRONCHUS, STENT BRONCHUS, COMBINED N/A 2/19/2021    Procedure: BRONCHOSCOPY, flexible, airway dilation, and bronchial wash;  Surgeon: Wilber Lin MD;  [facility]     BRONCHOSCOPY, DILATE BRONCHUS, STENT BRONCHUS, COMBINED N/A 4/9/2021    Procedure: BRONCHOSCOPY, flexible and rigid, Airway suctioning;  Surgeon: Mati Norris MD;  [facility]     CV RIGHT HEART CATH MEASUREMENTS RECORDED N/A 3/10/2020    Procedure: CV RIGHT HEART CATH;  Surgeon: Wai Garcia MD;  [facility]     ENT SURGERY      tonsillectomy as a child     ESOPHAGOSCOPY, GASTROSCOPY, DUODENOSCOPY (EGD), COMBINED N/A 10/29/2018    Procedure: COMBINED ESOPHAGOSCOPY, GASTROSCOPY, DUODENOSCOPY (EGD) with biopsies and polypectomy;  Surgeon: Chente Bloom MD;  [facility]     INSERT EXTRACORPORAL MEMBRANE OXYGENATOR ADULT (OUTSIDE OR) N/A 2/27/2018    Procedure: INSERT EXTRACORPORAL MEMBRANE OXYGENATOR ADULT (OUTSIDE OR);  INSERT EXTRACORPORAL MEMBRANE OXYGENATOR ADULT (OUTSIDE OR)  ;  Surgeon: Toby Hernandez MD;  [facility]     IR CVC TUNNEL PLACEMENT > 5 YRS OF AGE  10/25/2019     IR DIALYSIS FISTULOGRAM LEFT  3/2/2021     IR DIALYSIS MECH THROMB, PTA  3/2/2021     IR FLUORO 0-1 HOUR  5/7/2021     IR GASTRO JEJUNOSTOMY TUBE PLACEMENT  2/16/2021     IR PARACENTESIS  1/8/2020     IR THORACENTESIS  9/13/2019     IR TRANSCATHETER BIOPSY  1/8/2020     LASER CO2 BRONCHOSCOPY N/A 4/30/2021    Procedure: Flexible and Rigid Bronchoscopy and Tissue Debulking with CO2 Laser Assistance;  Surgeon: Mati Norris MD;  [facility]     LASER CO2 BRONCHOSCOPY N/A 6/11/2021    Procedure: BRONCHOSCOPY, Flexible and Rigid Bronchoscopy, Tissue Debulking with cryo Assistance, airway dilation,;  Surgeon: Mati Norris MD;  [facility]     LASER CO2 BRONCHOSCOPY N/A 9/16/2021    Procedure: BRONCHOSCOPY, flexible and rigid, CO2 Laser Debulking;  Surgeon: Mati Norris MD;  [facility]     LASER CO2 BRONCHOSCOPY N/A 2/11/2022    Procedure: flexible, rigid bronchoscopy, tissue debulking, airway dilation, co2 laser, bronchoalveolar lavage;  Surgeon: Juana Baugh MD;  [facility]     no prior surgery       REMOVE EXTRACORPORAL MEMBRANE OXYGENATOR ADULT N/A 3/3/2018    Procedure: REMOVE EXTRACORPORAL MEMBRANE OXYGENATOR ADULT;  Removal of Right Femoral Venous and Right Axillary Arterial Extracorporeal Membrane Oxygenator;  Surgeon: Toby Hernandez MD;  [facility]     TRANSPLANT LUNG RECIPIENT SINGLE X2 Bilateral 3/1/2018    Procedure: TRANSPLANT LUNG RECIPIENT SINGLE X2;  Median Sternotomy, Extracorporeal Membrane Oxygenator, bilateral sequential lung transplant;  Surgeon: Toby Hernandez MD;  [facility]          Medications  Prior to Admission medications    Medication Sig Start Date End Date Taking? Authorizing Provider   acetaminophen (TYLENOL) 325 MG tablet Take 1 tablet (325 mg) by mouth every 4 hours as needed for mild pain or fever 2/25/21   Yes Leelee Huang MD   albuterol (PROVENTIL) (2.5 MG/3ML) 0.083% neb solution Take 1 vial (2.5 mg) by nebulization every 6 hours as needed for shortness of breath, wheezing or cough 2/9/23  Yes Ame Chow PA-C   azaTHIOprine (IMURAN) 50 MG tablet Take 0.5 tablets (25 mg) by mouth daily 3/3/22  Yes Yenni Graham MD   budesonide (PULMICORT) 0.5 MG/2ML neb solution Take 2 mLs (0.5 mg) by nebulization daily as needed 2/9/23  Yes Ame Chow PA-C   calcium acetate (PHOSLO) 667 MG CAPS capsule Take 1 capsule (667 mg) by mouth 3 times daily (with meals) (largest meal) 5/26/22  Yes Ame Chow PA-C   Magnesium Cl-Calcium Carbonate (SLOW-MAG) 71.5-119 MG TBEC Take 3 tablets by mouth four times a week Take on non dialysis days T/Th/Sa/Su 4/8/21  Yes Yenni Graham MD   metoprolol tartrate (LOPRESSOR) 25 MG tablet 1 tablet (25 mg) by Oral or Feeding Tube route 2 times daily 6/22/22  Yes Ame Chow PA-C   multivitamin RENAL (RENAVITE RX/NEPHROVITE) 1 MG tablet Take 1 tablet by mouth daily 4/26/22  Yes Ame Chow PA-C   pantoprazole (PROTONIX) 40 MG EC tablet Take 1 tablet (40 mg) by mouth every morning (before breakfast) 3/1/22  Yes Ame Chow PA-C   posaconazole (NOXAFIL) 100 MG EC tablet Take 3 tablets (300 mg) by mouth daily 12/1/22  Yes Ame Chow PA-C   predniSONE (DELTASONE) 2.5 MG tablet Take 1 tablet (2.5 mg) by mouth every evening 5/10/22  Yes Ame Chow PA-C   predniSONE (DELTASONE) 5 MG tablet Take 1 tablet (5 mg) by mouth every morning 5/10/22  Yes Ame Chow PA-C   sulfamethoxazole-trimethoprim (BACTRIM) 400-80 MG tablet Take 1 tablet by mouth Every Mon, Wed, Fri Morning 5/4/22  Yes Ame Chow PA-C   tacrolimus (GENERIC EQUIVALENT) 0.5 MG capsule Take 1 capsule (0.5 mg) by mouth 2 times daily 1/17/23  Yes Ame Chow PA-C       Allergies  No Known Allergies    Review of systems  A 10-point  review of systems was negative    Examination  LMP 06/07/2014 (Exact Date)   There is no height or weight on file to calculate BMI.    Gen- well, NAD, A+Ox3, normal color  Psych- normal mood    Laboratory  Lab Results   Component Value Date     02/09/2023     05/07/2021    POTASSIUM 4.2 02/09/2023    POTASSIUM 3.5 05/26/2022    POTASSIUM 4.3 05/07/2021    CHLORIDE 100 03/08/2023    CHLORIDE 102 05/07/2021    CO2 30 02/09/2023    CO2 32 05/26/2022    CO2 23 05/07/2021    BUN 41.7 02/09/2023    BUN 42 05/26/2022    BUN 66 05/07/2021    CR 3.70 02/09/2023    CR 3.90 05/07/2021       Lab Results   Component Value Date    BILITOTAL 0.5 02/09/2023    BILITOTAL 0.9 03/29/2021    ALT 48 02/09/2023    ALT 36 03/29/2021    AST 30 02/09/2023    AST 19 03/29/2021    ALKPHOS 322 02/09/2023    ALKPHOS 288 03/29/2021       Lab Results   Component Value Date    ALBUMIN 4.1 02/09/2023    ALBUMIN 2.8 10/12/2021    ALBUMIN 3.5 03/29/2021    PROTTOTAL 7.7 02/09/2023    PROTTOTAL 7.2 05/01/2021        Lab Results   Component Value Date    WBC 6.0 02/09/2023    WBC 7.5 05/02/2021    HGB 11.0 02/09/2023    HGB 10.6 05/02/2021     02/09/2023    MCV 97 05/02/2021     02/09/2023     05/02/2021       Lab Results   Component Value Date    INR 0.96 05/26/2022    INR 0.99 04/29/2021       Radiology    ASSESSMENT AND PLAN:  New onset ascites.      Ms. Blue had new onset ascites.  We did a full workup. Her fluid analysis was compatible with portal hypertension.  She had done, also, a liver biopsy, which showed moderate zone 3 hepatocellular fibrosis.  She got at that time a diagnosis of congestive hepatopathy.  As her dialysis was optimized, she did well.  Her ascites resolved.     We did order an abdominal ultrasound the last time, which she has not done, but since the patient is coming here on 04/04, she will have that done here.  Her alkaline phosphatase is also the only elevated liver function test, and we  will isoenzyme that and see if part of it is related with bone, as the patient is on dialysis.  Otherwise, she will be seen here in 6 months, and at that time, we would like her to be in person.  Ms. Blue will follow with her nephrologist, and she will follow with her pulmonary specialists and PCP for her other healthcare maintenance issues.  She will be seen here in 6 months.    I spent 30 minutes on this encounter of 03/14/2023 in chart reviewing, history taking and documentation.  I spent some of the time, also, in coordination of care and counseling.    Feng Denise MD  Hepatology  [facility]

## 2023-03-14 NOTE — PROGRESS NOTES
Video-Visit Details    Type of service:  Video Visit    [facility]    Follow-up Visit    CHIEF COMPLAINT AND REASON FOR THE VISIT:  New onset ascites.    CONSULTING HEALTHCARE PROVIDER:  Rosy Vu MD.    SUBJECTIVE:  Ms. Blue is a 62-year-old female with a history of hypertension, end-stage kidney disease, currently on dialysis, interstitial lung disease with antisynthetase syndrome who had bilateral lung transplantation in 03/2018.  We did see her here for new onset ascites thought to be related with portal hypertension.      We did a full workup here and this was compatible with portal hypertension as the SAAG was more than 1.1 and biopsies of the liver showed that she had congestive hepatopathy with moderate zone 3 pericellular fibrosis without bridging fibrosis.  The portal pressure has hepatic vein portal pressure gradients that were elevated.     Now, as her dialysis was optimized, she has no abdominal distention, according to her, and no edema.  She also denies any abdominal pain, nausea or vomiting.  She is having no dyspnea on exertion or chest pain.  She is breathing relatively good and she is having regular bowel movements with no blood.  She also gets her dialysis.  Ms. Blue in 10/2022 did have a left knee replacement and it is planned that she will have her right knee also replaced in October of this year.  She is also very compliant with her medications.      Otherwise, she had no recent infections.  She did get the COVID Pfizer brand vaccination and has done so far 4 doses.    Medical hx Surgical hx   Past Medical History:   Diagnosis Date     Acute on chronic respiratory failure with hypoxia (H) 02/21/2018     Anisocoria      Antisynthetase syndrome (H) 2014     Anxiety      Aspergillus (H)      Aspergillus pneumonia (H) 11/20/2020     Benign essential hypertension      C. difficile colitis      Cytomegalovirus (CMV) viremia (H)      Dermatomyositis (H)       Dysplasia of cervix, low grade (ESTRADA 1)      EBV (Franklin-Barr virus) viremia      ESRD (end stage renal disease) on dialysis (H)      ILD (interstitial lung disease) (H)      Lung replaced by transplant (H)      Osteopenia      Paroxysmal atrial fibrillation (H)      Pneumocystis jiroveci pneumonia (H)      PONV (postoperative nausea and vomiting)      Post-menopause      Pulmonary hypertension (H)      Raynaud's disease      Seronegative rheumatoid arthritis (H)       Past Surgical History:   Procedure Laterality Date     BRONCHOSCOPY (RIGID OR FLEXIBLE), DIAGNOSTIC N/A 4/10/2018    Procedure: COMBINED BRONCHOSCOPY (RIGID OR FLEXIBLE), LAVAGE;;  Surgeon: Mariposa Donohue MD;  [facility]     BRONCHOSCOPY (RIGID OR FLEXIBLE), DIAGNOSTIC N/A 12/23/2020    Procedure: BRONCHOSCOPY, WITH BRONCHOALVEOLAR LAVAGE;  Surgeon: Uri Bass MD;  [facility]     BRONCHOSCOPY (RIGID OR FLEXIBLE), DIAGNOSTIC N/A 5/26/2022    Procedure: BRONCHOSCOPY, WITH BRONCHOALVEOLAR LAVAGE;  Surgeon: Uri Bass MD;  [facility]: [facility]     BRONCHOSCOPY (RIGID OR FLEXIBLE), DILATE BRONCHUS / TRACHEA N/A 10/11/2018    Procedure: BRONCHOSCOPY (RIGID OR FLEXIBLE), DILATE BRONCHUS / TRACHEA;  Flexible And Rigid Bronchoscopy and Dilation;  Surgeon: Wilber Lin MD;  [facility]     BRONCHOSCOPY FLEXIBLE N/A 3/13/2018    Procedure: BRONCHOSCOPY FLEXIBLE;  Flexible Bronchoscopy ;  Surgeon: Gissell Sanchez MD;  [facility]     BRONCHOSCOPY FLEXIBLE N/A 5/9/2018    Procedure: BRONCHOSCOPY FLEXIBLE;;  Surgeon: Wilber Lin MD;  [facility]     BRONCHOSCOPY FLEXIBLE AND RIGID N/A 9/10/2018    Procedure: BRONCHOSCOPY FLEXIBLE AND RIGID;  Flexible and Rigid Bronchoscopy with Balloon Dilation, tissue debulking with cryo, and Right mainstem bronchus stent placement;  Surgeon: Wilber Lin MD;  [facility]     BRONCHOSCOPY RIGID N/A 6/6/2018    Procedure: BRONCHOSCOPY RIGID;;  Surgeon:  Lopez Macias MD;  [facility]     BRONCHOSCOPY, DILATE BRONCHUS, STENT BRONCHUS, COMBINED N/A 6/11/2018    Procedure: COMBINED BRONCHOSCOPY, DILATE BRONCHUS, STENT BRONCHUS;  Flexible Bronchoscopy, Balloon Dilation, Bronchial Washings;  Surgeon: Wibler Lin MD;  [facility]     BRONCHOSCOPY, DILATE BRONCHUS, STENT BRONCHUS, COMBINED Right 7/10/2018    Procedure: COMBINED BRONCHOSCOPY, DILATE BRONCHUS, STENT BRONCHUS;  Flexible Bronchoscopy, Balloon Dilation, Bronchial Washings  ;  Surgeon: Wilber Lin MD;  [facility]     BRONCHOSCOPY, DILATE BRONCHUS, STENT BRONCHUS, COMBINED N/A 8/2/2018    Procedure: COMBINED BRONCHOSCOPY, DILATE BRONCHUS, STENT BRONCHUS;  Flexible Bronchoscopy, Bronchial Washings, Balloon Dilation;  Surgeon: Wilber Lin MD;  [facility]     BRONCHOSCOPY, DILATE BRONCHUS, STENT BRONCHUS, COMBINED N/A 8/20/2018    Procedure: COMBINED BRONCHOSCOPY, DILATE BRONCHUS, STENT BRONCHUS;  Flexible Bronchoscopy, Balloon Dilation;  Surgeon: Wilber Lin MD;  [facility]     BRONCHOSCOPY, DILATE BRONCHUS, STENT BRONCHUS, COMBINED N/A 10/29/2018    Procedure: Flexible Bronchoscopy, Balloon Dilation, Stent Revision, Airway Examination And Therapeutic Suctioning, Cyro Tumor Debulking;  Surgeon: Wilber Lin MD;  [facility]     BRONCHOSCOPY, DILATE BRONCHUS, STENT BRONCHUS, COMBINED N/A 11/20/2018    Procedure: Rigid Bronchoscopy, Stent Removal and dilitation;  Surgeon: Wilber Lin MD;  [facility]     BRONCHOSCOPY, DILATE BRONCHUS, STENT BRONCHUS, COMBINED N/A 12/14/2018    Procedure: Flexible And Rigid Bronchoscopy, Balloon Dilation, Bronchial Washing;  Surgeon: Wilber Lin MD;  [facility]     BRONCHOSCOPY, DILATE BRONCHUS, STENT BRONCHUS, COMBINED N/A 1/17/2019    Procedure: Flexible And Rigid Bronchoscopy, Balloon Dilation.;  Surgeon: Wilber Lin MD;  [facility]      BRONCHOSCOPY, DILATE BRONCHUS, STENT BRONCHUS, COMBINED N/A 3/7/2019    Procedure: Flexible and Rigid Bronchoscopy, Bronchial Washing, Balloon Dilation;  Surgeon: Wilber Lin MD;  [facility]     BRONCHOSCOPY, DILATE BRONCHUS, STENT BRONCHUS, COMBINED N/A 6/6/2019    Procedure: Rigid and Flexible Bronchoscopy, Balloon Dilation;  Surgeon: Wilber Lin MD;  [facility]     BRONCHOSCOPY, DILATE BRONCHUS, STENT BRONCHUS, COMBINED N/A 10/11/2019    Procedure: Flexible and Rigid Bronchoscopy, Balloon Dilation, Bronchoalveolar Lagave;  Surgeon: Wilber Lin MD;  [facility]     BRONCHOSCOPY, DILATE BRONCHUS, STENT BRONCHUS, COMBINED N/A 2/19/2021    Procedure: BRONCHOSCOPY, flexible, airway dilation, and bronchial wash;  Surgeon: Wilber Lin MD;  [facility]     BRONCHOSCOPY, DILATE BRONCHUS, STENT BRONCHUS, COMBINED N/A 4/9/2021    Procedure: BRONCHOSCOPY, flexible and rigid, Airway suctioning;  Surgeon: Mati Norris MD;  [facility]     CV RIGHT HEART CATH MEASUREMENTS RECORDED N/A 3/10/2020    Procedure: CV RIGHT HEART CATH;  Surgeon: Wai Garcia MD;  [facility]     ENT SURGERY      tonsillectomy as a child     ESOPHAGOSCOPY, GASTROSCOPY, DUODENOSCOPY (EGD), COMBINED N/A 10/29/2018    Procedure: COMBINED ESOPHAGOSCOPY, GASTROSCOPY, DUODENOSCOPY (EGD) with biopsies and polypectomy;  Surgeon: Chente Bloom MD;  [facility]     INSERT EXTRACORPORAL MEMBRANE OXYGENATOR ADULT (OUTSIDE OR) N/A 2/27/2018    Procedure: INSERT EXTRACORPORAL MEMBRANE OXYGENATOR ADULT (OUTSIDE OR);  INSERT EXTRACORPORAL MEMBRANE OXYGENATOR ADULT (OUTSIDE OR) ;  Surgeon: Toby Hernandez MD;  [facility]     IR CVC TUNNEL PLACEMENT > 5 YRS OF AGE  10/25/2019     IR DIALYSIS FISTULOGRAM LEFT  3/2/2021     IR DIALYSIS MECH THROMB, PTA  3/2/2021     IR FLUORO 0-1 HOUR  5/7/2021     IR GASTRO JEJUNOSTOMY TUBE PLACEMENT   2/16/2021     IR PARACENTESIS  1/8/2020     IR THORACENTESIS  9/13/2019     IR TRANSCATHETER BIOPSY  1/8/2020     LASER CO2 BRONCHOSCOPY N/A 4/30/2021    Procedure: Flexible and Rigid Bronchoscopy and Tissue Debulking with CO2 Laser Assistance;  Surgeon: Mati Norris MD;  [facility]     LASER CO2 BRONCHOSCOPY N/A 6/11/2021    Procedure: BRONCHOSCOPY, Flexible and Rigid Bronchoscopy, Tissue Debulking with cryo Assistance, airway dilation,;  Surgeon: Mati Norris MD;  [facility]     LASER CO2 BRONCHOSCOPY N/A 9/16/2021    Procedure: BRONCHOSCOPY, flexible and rigid, CO2 Laser Debulking;  Surgeon: Mati Norris MD;  [facility]     LASER CO2 BRONCHOSCOPY N/A 2/11/2022    Procedure: flexible, rigid bronchoscopy, tissue debulking, airway dilation, co2 laser, bronchoalveolar lavage;  Surgeon: Juana Baugh MD;  [facility]     no prior surgery       REMOVE EXTRACORPORAL MEMBRANE OXYGENATOR ADULT N/A 3/3/2018    Procedure: REMOVE EXTRACORPORAL MEMBRANE OXYGENATOR ADULT;  Removal of Right Femoral Venous and Right Axillary Arterial Extracorporeal Membrane Oxygenator;  Surgeon: Toby Hernandez MD;  [facility]     TRANSPLANT LUNG RECIPIENT SINGLE X2 Bilateral 3/1/2018    Procedure: TRANSPLANT LUNG RECIPIENT SINGLE X2;  Median Sternotomy, Extracorporeal Membrane Oxygenator, bilateral sequential lung transplant;  Surgeon: Toby Hernandez MD;  [facility]          Medications  Prior to Admission medications    Medication Sig Start Date End Date Taking? Authorizing Provider   acetaminophen (TYLENOL) 325 MG tablet Take 1 tablet (325 mg) by mouth every 4 hours as needed for mild pain or fever 2/25/21  Yes Leelee Huang MD   albuterol (PROVENTIL) (2.5 MG/3ML) 0.083% neb solution Take 1 vial (2.5 mg) by nebulization every 6 hours as needed for shortness of breath, wheezing or cough 2/9/23  Yes Ame Chow PA-C   azaTHIOprine (IMURAN) 50 MG tablet Take 0.5  tablets (25 mg) by mouth daily 3/3/22  Yes Yenni Graham MD   budesonide (PULMICORT) 0.5 MG/2ML neb solution Take 2 mLs (0.5 mg) by nebulization daily as needed 2/9/23  Yes Ame Chow PA-C   calcium acetate (PHOSLO) 667 MG CAPS capsule Take 1 capsule (667 mg) by mouth 3 times daily (with meals) (largest meal) 5/26/22  Yes Ame Chow PA-C   Magnesium Cl-Calcium Carbonate (SLOW-MAG) 71.5-119 MG TBEC Take 3 tablets by mouth four times a week Take on non dialysis days T/Th/Sa/Su 4/8/21  Yes Yenni Graham MD   metoprolol tartrate (LOPRESSOR) 25 MG tablet 1 tablet (25 mg) by Oral or Feeding Tube route 2 times daily 6/22/22  Yes Ame Chow PA-C   multivitamin RENAL (RENAVITE RX/NEPHROVITE) 1 MG tablet Take 1 tablet by mouth daily 4/26/22  Yes Ame Chow PA-C   pantoprazole (PROTONIX) 40 MG EC tablet Take 1 tablet (40 mg) by mouth every morning (before breakfast) 3/1/22  Yes Ame Chow PA-C   posaconazole (NOXAFIL) 100 MG EC tablet Take 3 tablets (300 mg) by mouth daily 12/1/22  Yes Ame Chow PA-C   predniSONE (DELTASONE) 2.5 MG tablet Take 1 tablet (2.5 mg) by mouth every evening 5/10/22  Yes Ame Chow PA-C   predniSONE (DELTASONE) 5 MG tablet Take 1 tablet (5 mg) by mouth every morning 5/10/22  Yes Ame Chow PA-C   sulfamethoxazole-trimethoprim (BACTRIM) 400-80 MG tablet Take 1 tablet by mouth Every Mon, Wed, Fri Morning 5/4/22  Yes Aem Chow PA-C   tacrolimus (GENERIC EQUIVALENT) 0.5 MG capsule Take 1 capsule (0.5 mg) by mouth 2 times daily 1/17/23  Yes Ame Chow PA-C       Allergies  No Known Allergies    Review of systems  A 10-point review of systems was negative    Examination  LMP 06/07/2014 (Exact Date)   There is no height or weight on file to calculate BMI.    Gen- well, NAD, A+Ox3, normal color  Psych- normal mood    Laboratory  Lab Results   Component Value Date     02/09/2023    NA  136 05/07/2021    POTASSIUM 4.2 02/09/2023    POTASSIUM 3.5 05/26/2022    POTASSIUM 4.3 05/07/2021    CHLORIDE 100 03/08/2023    CHLORIDE 102 05/07/2021    CO2 30 02/09/2023    CO2 32 05/26/2022    CO2 23 05/07/2021    BUN 41.7 02/09/2023    BUN 42 05/26/2022    BUN 66 05/07/2021    CR 3.70 02/09/2023    CR 3.90 05/07/2021       Lab Results   Component Value Date    BILITOTAL 0.5 02/09/2023    BILITOTAL 0.9 03/29/2021    ALT 48 02/09/2023    ALT 36 03/29/2021    AST 30 02/09/2023    AST 19 03/29/2021    ALKPHOS 322 02/09/2023    ALKPHOS 288 03/29/2021       Lab Results   Component Value Date    ALBUMIN 4.1 02/09/2023    ALBUMIN 2.8 10/12/2021    ALBUMIN 3.5 03/29/2021    PROTTOTAL 7.7 02/09/2023    PROTTOTAL 7.2 05/01/2021        Lab Results   Component Value Date    WBC 6.0 02/09/2023    WBC 7.5 05/02/2021    HGB 11.0 02/09/2023    HGB 10.6 05/02/2021     02/09/2023    MCV 97 05/02/2021     02/09/2023     05/02/2021       Lab Results   Component Value Date    INR 0.96 05/26/2022    INR 0.99 04/29/2021       Radiology    ASSESSMENT AND PLAN:  New onset ascites.      Ms. Blue had new onset ascites.  We did a full workup. Her fluid analysis was compatible with portal hypertension.  She had done, also, a liver biopsy, which showed moderate zone 3 hepatocellular fibrosis.  She got at that time a diagnosis of congestive hepatopathy.  As her dialysis was optimized, she did well.  Her ascites resolved.     We did order an abdominal ultrasound the last time, which she has not done, but since the patient is coming here on 04/04, she will have that done here.  Her alkaline phosphatase is also the only elevated liver function test, and we will isoenzyme that and see if part of it is related with bone, as the patient is on dialysis.  Otherwise, she will be seen here in 6 months, and at that time, we would like her to be in person.  Ms. Pintojudy will follow with her nephrologist, and she will follow  with her pulmonary specialists and PCP for her other healthcare maintenance issues.  She will be seen here in 6 months.    I spent 30 minutes on this encounter of 03/14/2023 in chart reviewing, history taking and documentation.  I spent some of the time, also, in coordination of care and counseling.    Feng Denise MD  Hepatology  [facility]    Video Start Time (time video started): 8:48 AM.    Video End Time (time video stopped): 9:11 AM.    Originating Location (pt. Location): Home        Distant Location (provider location):  On-site    Mode of Communication:  Video Conference via Personally

## 2023-03-15 DIAGNOSIS — Z94.2 LUNG TRANSPLANT STATUS, BILATERAL (H): ICD-10-CM

## 2023-03-15 RX ORDER — AZATHIOPRINE 50 MG/1
25 TABLET ORAL DAILY
Qty: 45 TABLET | Refills: 3 | Status: SHIPPED | OUTPATIENT
Start: 2023-03-15 | End: 2023-07-11

## 2023-03-16 ENCOUNTER — TELEPHONE (OUTPATIENT)
Dept: PULMONOLOGY | Facility: CLINIC | Age: 63
End: 2023-03-16
Payer: MEDICARE

## 2023-03-16 NOTE — TELEPHONE ENCOUNTER
Prior Authorization Approval    Authorization Effective Date: 1/1/2023  Authorization Expiration Date:  For as long as you remain on this plan   Medication: azaTHIOprine 50MG tablets- pa approved  Approved Dose/Quantity: UD  Reference #:     Insurance Company: [employer] - Phone [phone] Fax [phone]  Expected CoPay:       CoPay Card Available:      Foundation Assistance Needed:    Which Pharmacy is filling the prescription (Not needed for infusion/clinic administered): [id number] - [place], [place] - [address] [place] [address] AT [facility]  Pharmacy Notified: Yes  Patient Notified: yes

## 2023-03-16 NOTE — TELEPHONE ENCOUNTER
PA Initiation    Medication: azaTHIOprine 50MG tablets- pa pending  Insurance Company: [id number] - Phone [phone] Fax [phone]  Pharmacy Filling the Rx: [id number] - [place] [address] AT [facility]  Filling Pharmacy Phone: [phone]  Filling Pharmacy Fax:    Start Date: 3/16/2023

## 2023-03-17 DIAGNOSIS — Z94.2 S/P LUNG TRANSPLANT (H): ICD-10-CM

## 2023-03-17 RX ORDER — SULFAMETHOXAZOLE AND TRIMETHOPRIM 400; 80 MG/1; MG/1
1 TABLET ORAL
Qty: 39 TABLET | Refills: 3 | Status: ON HOLD | OUTPATIENT
Start: 2023-03-17 | End: 2023-01-01

## 2023-03-28 NOTE — PROCEDURES
INTERVENTIONAL PULMONOLOGY     Procedure(s):    A flexible and rigid bronchoscopy   Airway exam  Balloon bronchoplasty without stent placement (1 sites)     Indication:  BSLT with R anastomotic stenosis    Attending of Record:  Alana Lin MD    Interventional Pulmonary Fellow   Preet Patel MD     Trainees Present:   None    Medications:    General Anesthesia - See anesthesia flowsheet for details    Sedation Time:   Per Anesthesia Care Provider    Time Out:  Performed    The patient's medical record has been reviewed.  The indication for the procedure was reviewed.  The necessary history and physical examination was performed and reviewed.  The risks, benefits and alternatives of the procedure were discussed with the the patient in detail and she had the opportunity to ask questions.  I discussed in particular the potential complications including risks of minor or life-threatening bleeding and/or infection, respiratory failure, vocal cord trauma / paralysis, pneumothorax, and discomfort. Sedation risks were also discussed including abnormal heart rhythms, low blood pressure, and respiratory failure. All questions were answered to the best of my ability.  Verbal and written informed consent was obtained.  The proposed procedure and the patient's identification were verified prior to the procedure by the physician and the surgical team.    The patient was assessed for the adequacy for the procedure and to receive medications.   Mental Status:  Alert and oriented x 3  Airway examination:  Class II (Complete visualization of uvula)  Pulmonary:  Decreased breathsound throughout  CV:  RRR, no murmurs or gallops  ASA Grade:  (III)  Severe systemic disease    After clinical evaluation and reviewing the indication, risks, alternatives and benefits of the procedure the patient was deemed to be in satisfactory condition to undergo the procedure.           A Tuberculosis risk assessment was performed:  The patient  has no known RISK of Tuberculosis    The procedure was performed in a negative airflow room: The patient could not be moved to a negative airflow room because of needed OR for the procedure    Maneuvers / Procedure:      A Flexible and 8.5mm Rigid bronchoscope bronchoscope was used for the procedure. The rigid bronchoscope was inserted into the mouth. Uvula, epiglottis and vocal cords were seen. The scope was advanced turning the bevel to 90degress while passing through the cords and into the trachea.   Airway dilation: Airway dilation#1: The 8-9-10mm Elation Ballooon was used to dilate the Right mainstem  airway. Each dilation was held for 60sec and repeated 3 times  Airway Examination: A complete airway examination was performed from the distal trachea to the subsegmental level in each lobe of both lungs.  Pertinent findings include moderate R main stenosis with some TBM.         Any disposable equipment was visually inspected and deemed to be intact immediately post procedure.      Relevant Pictures    Right anastomosis        Post dilation, R anastomsosi        Recommendations:     -->  Plan for repeat bronchoscopy in OR in 6 weeks  -->  We will update transplant team        Preet Patel MD  Interventional Pulmonary  Department of Pulmonary, Allergy, Critical Care and Sleep Medicine   [facility]    Krystle CHAVEZ, OCN  Clinical Nurse Specialist  Department of Thoracic Surgery  Office: [phone]  Email: [email or web address]    Chloe Avila  Interventional Pulmonology Surgery Scheduler  Office: [phone]  Email: [email or web address]    I was present with the patient, Kecia Blue, for the entire viewing portion of the bronchscopy procedure (including scope insertion and withdrawal) and agree with the interpretation and report as documented by  .   Alana Lin MD     Home

## 2023-04-03 NOTE — PROGRESS NOTES
[facility]  April 6, 2023         Assessment and Plan:   Kecia Blue is a 62 year old female with h/o bilateral lung transplant on 3/1/18 for ILD with antisynthetase syndrome with postoperative course complicated by right mainstem bronchial stenosis s/p several dilations, left-sided Aspergillus empyema s/p amphotericin beads, current on posaconazole indefinitely, EBV viremia, CMV viremia, C diff colitis and ESRD on HD who is seen today for pre op clearance. She is scheduled for a R TKA on 4/14.     1. Bilateral lung transplant: feeling well, has been able to do a bit more activity after her first knee replacement. No recent illnesses. Sating 99% on room air. DSA 2/9 and CMV 3/8 negative. CXR reviewed by me with stable basilar and perihilar opacities. PFTs today improved to her recent best.   - Continue AZA 25 mg daily, continue tacrolimus (goal 8-10) and prednisone, decrease to 5 mg daily for significant osteoporosis  - On Bactrim 3 times/week    2. Right main bronchial stenosis s/p ligation: with multiple bronchs in the OR, last included debulking/dilation of the RERE. No new complaints.  - Follow up with IP as needed    3. Congestive hepatopathy with fibrosis: complicated by ascites secondary to portal hypertension. Chronic elevation of alk phos. Fluid optimization with dialysis. Follows with Dr. Denise.    4. Left-sided aspergillus empyema: noted in 2019 s/p needle aspiration with Aspergillus fumigatus on cultures s/p intrapleural amphotericin bead placement. Likely with be on posaconazole for life per ID.   - Continue posaconazole    5. ESRD on iHD (since 10/2019): continues on iHD M/W/F. Renal transplant listing on hold for her knee surgeries and likely for 6 months afterward.   - Continue metoprolol, renal VMI, phosphate binder    6. Osteoporosis: on DEXA from 2022 with significant loss of bone in her lumbar spine and femur. Complicated by  steroid use and fact that her activity is severely limited by her knee pain. Vitamin D level of 40. Has Endocrine appt in April.  - Continue supplementation  - Decrease prednisone to 5 mg daily    Chronic issues:  1. Paroxysmal AFib  2. Hypomagnesemia  3. Dermatomyositis with Raynauds    RTC: 3 months  Vaccinations: UTD with flu and bivalent covid vaccine  Preventative: colonoscopy due this year; mammogram in February negative; DEXA > May 2024; following with Derm    Ame Chow PA-C  Pulmonary, Allergy, Critical Care and Sleep Medicine        Interval History:     Done with PT for her left knee, using a wheeled walker outside, not using any assistive device in the house. No fever or chills, no recent illnesses. Breathing is good, mild shortness of breath with activity, minimal cough in the am, occasionally productive, no congestion or tightness. No chest pain or palpitations. No bloating or gas, better appetite. Stools are normal, not loose.           Review of Systems:   Please see HPI, otherwise the complete 10 point ROS is negative.           Past Medical and Surgical History:     Past Medical History:   Diagnosis Date     Acute on chronic respiratory failure with hypoxia (H) 02/21/2018     Anisocoria      Antisynthetase syndrome (H) 2014     Anxiety      Aspergillus (H)      Aspergillus pneumonia (H) 11/20/2020     Benign essential hypertension      C. difficile colitis      Cytomegalovirus (CMV) viremia (H)      Dermatomyositis (H)      Dysplasia of cervix, low grade (ESTRADA 1)      EBV (Franklin-Barr virus) viremia      ESRD (end stage renal disease) on dialysis (H)      ILD (interstitial lung disease) (H)      Lung replaced by transplant (H)      Osteopenia      Paroxysmal atrial fibrillation (H)      Pneumocystis jiroveci pneumonia (H)      PONV (postoperative nausea and vomiting)      Post-menopause      Pulmonary hypertension (H)      Raynaud's disease      Seronegative rheumatoid arthritis (H)      Past  Surgical History:   Procedure Laterality Date     BRONCHOSCOPY (RIGID OR FLEXIBLE), DIAGNOSTIC N/A 4/10/2018    Procedure: COMBINED BRONCHOSCOPY (RIGID OR FLEXIBLE), LAVAGE;;  Surgeon: Mariposa Donohue MD;  Location: [facility]     BRONCHOSCOPY (RIGID OR FLEXIBLE), DIAGNOSTIC N/A 12/23/2020    Procedure: BRONCHOSCOPY, WITH BRONCHOALVEOLAR LAVAGE;  Surgeon: Uri Bass MD;  [facility]: [facility]     BRONCHOSCOPY (RIGID OR FLEXIBLE), DIAGNOSTIC N/A 5/26/2022    Procedure: BRONCHOSCOPY, WITH BRONCHOALVEOLAR LAVAGE;  Surgeon: Uri Bass MD;  Location: [facility]     BRONCHOSCOPY (RIGID OR FLEXIBLE), DILATE BRONCHUS / TRACHEA N/A 10/11/2018    Procedure: BRONCHOSCOPY (RIGID OR FLEXIBLE), DILATE BRONCHUS / TRACHEA;  Flexible And Rigid Bronchoscopy and Dilation;  Surgeon: Wilber Lin MD;  [facility]     BRONCHOSCOPY FLEXIBLE N/A 3/13/2018    Procedure: BRONCHOSCOPY FLEXIBLE;  Flexible Bronchoscopy ;  Surgeon: Gissell Sanchez MD;  [facility]: [facility]     BRONCHOSCOPY FLEXIBLE N/A 5/9/2018    Procedure: BRONCHOSCOPY FLEXIBLE;;  Surgeon: Wilber Lin MD;  [facility]: [facility]     BRONCHOSCOPY FLEXIBLE AND RIGID N/A 9/10/2018    Procedure: BRONCHOSCOPY FLEXIBLE AND RIGID;  Flexible and Rigid Bronchoscopy with Balloon Dilation, tissue debulking with cryo, and Right mainstem bronchus stent placement;  Surgeon: Wilber Lin MD;  [facility]     BRONCHOSCOPY RIGID N/A 6/6/2018    Procedure: BRONCHOSCOPY RIGID;;  Surgeon: Lopez Macias MD;  [facility]: [facility]     BRONCHOSCOPY, DILATE BRONCHUS, STENT BRONCHUS, COMBINED N/A 6/11/2018    Procedure: COMBINED BRONCHOSCOPY, DILATE BRONCHUS, STENT BRONCHUS;  Flexible Bronchoscopy, Balloon Dilation, Bronchial Washings;  Surgeon: Wilber Lin MD;  [facility]     BRONCHOSCOPY, DILATE BRONCHUS, STENT BRONCHUS, COMBINED Right 7/10/2018    Procedure: COMBINED BRONCHOSCOPY, DILATE BRONCHUS, STENT BRONCHUS;  Flexible Bronchoscopy,  Balloon Dilation, Bronchial Washings  ;  Surgeon: Wilber Lin MD;  [facility]     BRONCHOSCOPY, DILATE BRONCHUS, STENT BRONCHUS, COMBINED N/A 8/2/2018    Procedure: COMBINED BRONCHOSCOPY, DILATE BRONCHUS, STENT BRONCHUS;  Flexible Bronchoscopy, Bronchial Washings, Balloon Dilation;  Surgeon: Wilber Lin MD;  [facility]     BRONCHOSCOPY, DILATE BRONCHUS, STENT BRONCHUS, COMBINED N/A 8/20/2018    Procedure: COMBINED BRONCHOSCOPY, DILATE BRONCHUS, STENT BRONCHUS;  Flexible Bronchoscopy, Balloon Dilation;  Surgeon: Wilber Lin MD;  [facility]     BRONCHOSCOPY, DILATE BRONCHUS, STENT BRONCHUS, COMBINED N/A 10/29/2018    Procedure: Flexible Bronchoscopy, Balloon Dilation, Stent Revision, Airway Examination And Therapeutic Suctioning, Cyro Tumor Debulking;  Surgeon: Wilber Lin MD;  [facility]     BRONCHOSCOPY, DILATE BRONCHUS, STENT BRONCHUS, COMBINED N/A 11/20/2018    Procedure: Rigid Bronchoscopy, Stent Removal and dilitation;  Surgeon: Wilber Lin MD;  [facility]     BRONCHOSCOPY, DILATE BRONCHUS, STENT BRONCHUS, COMBINED N/A 12/14/2018    Procedure: Flexible And Rigid Bronchoscopy, Balloon Dilation, Bronchial Washing;  Surgeon: Wilber Lin MD;  [facility]     BRONCHOSCOPY, DILATE BRONCHUS, STENT BRONCHUS, COMBINED N/A 1/17/2019    Procedure: Flexible And Rigid Bronchoscopy, Balloon Dilation.;  Surgeon: Wilber Lin MD;  [facility]     BRONCHOSCOPY, DILATE BRONCHUS, STENT BRONCHUS, COMBINED N/A 3/7/2019    Procedure: Flexible and Rigid Bronchoscopy, Bronchial Washing, Balloon Dilation;  Surgeon: Wilber Lin MD;  [facility]     BRONCHOSCOPY, DILATE BRONCHUS, STENT BRONCHUS, COMBINED N/A 6/6/2019    Procedure: Rigid and Flexible Bronchoscopy, Balloon Dilation;  Surgeon: Wilber Lin MD;  [facility]     BRONCHOSCOPY, DILATE BRONCHUS, STENT BRONCHUS, COMBINED N/A 10/11/2019     Procedure: Flexible and Rigid Bronchoscopy, Balloon Dilation, Bronchoalveolar Lagave;  Surgeon: Wilber Lin MD;  [facility]     BRONCHOSCOPY, DILATE BRONCHUS, STENT BRONCHUS, COMBINED N/A 2/19/2021    Procedure: BRONCHOSCOPY, flexible, airway dilation, and bronchial wash;  Surgeon: Wilber Lin MD;  [facility]     BRONCHOSCOPY, DILATE BRONCHUS, STENT BRONCHUS, COMBINED N/A 4/9/2021    Procedure: BRONCHOSCOPY, flexible and rigid, Airway suctioning;  Surgeon: Mati Norris MD;  [facility]     CV RIGHT HEART CATH MEASUREMENTS RECORDED N/A 3/10/2020    Procedure: CV RIGHT HEART CATH;  Surgeon: Wai Garcia MD;  [facility]     ENT SURGERY      tonsillectomy as a child     ESOPHAGOSCOPY, GASTROSCOPY, DUODENOSCOPY (EGD), COMBINED N/A 10/29/2018    Procedure: COMBINED ESOPHAGOSCOPY, GASTROSCOPY, DUODENOSCOPY (EGD) with biopsies and polypectomy;  Surgeon: Chente Bloom MD;  [facility]     INSERT EXTRACORPORAL MEMBRANE OXYGENATOR ADULT (OUTSIDE OR) N/A 2/27/2018    Procedure: INSERT EXTRACORPORAL MEMBRANE OXYGENATOR ADULT (OUTSIDE OR);  INSERT EXTRACORPORAL MEMBRANE OXYGENATOR ADULT (OUTSIDE OR) ;  Surgeon: Toby Hernandez MD;  [facility]     IR CVC TUNNEL PLACEMENT > 5 YRS OF AGE  10/25/2019     IR DIALYSIS FISTULOGRAM LEFT  3/2/2021     IR DIALYSIS MECH THROMB, PTA  3/2/2021     IR FLUORO 0-1 HOUR  5/7/2021     IR GASTRO JEJUNOSTOMY TUBE PLACEMENT  2/16/2021     IR PARACENTESIS  1/8/2020     IR THORACENTESIS  9/13/2019     IR TRANSCATHETER BIOPSY  1/8/2020     LASER CO2 BRONCHOSCOPY N/A 4/30/2021    Procedure: Flexible and Rigid Bronchoscopy and Tissue Debulking with CO2 Laser Assistance;  Surgeon: Mati Norris MD;  [facility]     LASER CO2 BRONCHOSCOPY N/A 6/11/2021    Procedure: BRONCHOSCOPY, Flexible and Rigid Bronchoscopy, Tissue Debulking with cryo Assistance, airway dilation,;  Surgeon: Mati Norris MD;   [facility]     LASER CO2 BRONCHOSCOPY N/A 2021    Procedure: BRONCHOSCOPY, flexible and rigid, CO2 Laser Debulking;  Surgeon: Mati Norris MD;  [facility]     LASER CO2 BRONCHOSCOPY N/A 2022    Procedure: flexible, rigid bronchoscopy, tissue debulking, airway dilation, co2 laser, bronchoalveolar lavage;  Surgeon: Juana Baugh MD;  [facility]     no prior surgery       REMOVE EXTRACORPORAL MEMBRANE OXYGENATOR ADULT N/A 3/3/2018    Procedure: REMOVE EXTRACORPORAL MEMBRANE OXYGENATOR ADULT;  Removal of Right Femoral Venous and Right Axillary Arterial Extracorporeal Membrane Oxygenator;  Surgeon: Toby Hernandez MD;  [facility]     TRANSPLANT LUNG RECIPIENT SINGLE X2 Bilateral 3/1/2018    Procedure: TRANSPLANT LUNG RECIPIENT SINGLE X2;  Median Sternotomy, Extracorporeal Membrane Oxygenator, bilateral sequential lung transplant;  Surgeon: Toby Hernandez MD;  [facility]           Family History:     Family History   Problem Relation Age of Onset     Hypertension Mother      Arthritis Mother      Pancreatic Cancer Father      Diabetes Father             Social History:     Social History     Socioeconomic History     Marital status:      Spouse name: Not on file     Number of children: Not on file     Years of education: Not on file     Highest education level: Not on file   Occupational History     Not on file   Tobacco Use     Smoking status: Never     Smokeless tobacco: Never   Vaping Use     Vaping status: Not on file   Substance and Sexual Activity     Alcohol use: No     Alcohol/week: 1.0 standard drink of alcohol     Types: 1 Glasses of wine per week     Drug use: No     Sexual activity: Not on file   Other Topics Concern     Parent/sibling w/ CABG, MI or angioplasty before 65F 55M? No   Social History Narrative    3/6/2019 - Lives with . Has three daughters. Four grandchildren (two ). No pets. Travelled previously to [place],  [place]. Has visited [place] several times.      Social Determinants of Health     Financial Resource Strain: Not on file   Food Insecurity: Not on file   Transportation Needs: Not on file   Physical Activity: Not on file   Stress: Not on file   Social Connections: Not on file   Intimate Partner Violence: Not on file   Housing Stability: Not on file            Medications:     Current Outpatient Medications   Medication     acetaminophen (TYLENOL) 325 MG tablet     albuterol (PROVENTIL) (2.5 MG/3ML) 0.083% neb solution     azaTHIOprine (IMURAN) 50 MG tablet     budesonide (PULMICORT) 0.5 MG/2ML neb solution     calcium acetate (PHOSLO) 667 MG CAPS capsule     Magnesium Cl-Calcium Carbonate (SLOW-MAG) 71.5-119 MG TBEC     metoprolol tartrate (LOPRESSOR) 25 MG tablet     multivitamin RENAL (RENAVITE RX/NEPHROVITE) 1 MG tablet     pantoprazole (PROTONIX) 40 MG EC tablet     posaconazole (NOXAFIL) 100 MG EC tablet     predniSONE (DELTASONE) 5 MG tablet     sulfamethoxazole-trimethoprim (BACTRIM) 400-80 MG tablet     tacrolimus (GENERIC EQUIVALENT) 0.5 MG capsule     No current facility-administered medications for this visit.            Physical Exam:   /80   Pulse 80   LMP 06/07/2014 (Exact Date)   SpO2 99%     GENERAL: alert, NAD  HEENT: NCAT, EOMI, no scleral icterus, oral mucosa moist and without lesions  Neck: no cervical or supraclavicular adenopathy  Lungs: moderate airflow, mainly clear  CV: irregular rhythm, S1S2, no murmurs noted  Abdomen: normoactive BS, soft  Lymph: no edema  Neuro: AAO X 3, CN 2-12 grossly intact  Psychiatric: normal affect, good eye contact  Skin: no rash, jaundice or lesions on limited exam         Data:   All laboratory and imaging data reviewed.      Recent Results (from the past 168 hour(s))   Basic metabolic panel    Collection Time: 04/06/23  7:25 AM   Result Value Ref Range    Sodium 137 136 - 145 mmol/L    Potassium 5.4 (H) 3.4 - 5.3 mmol/L    Chloride 96 (L) 98 - 107  mmol/L    Carbon Dioxide (CO2) 29 22 - 29 mmol/L    Anion Gap 12 7 - 15 mmol/L    Urea Nitrogen 69.0 (H) 8.0 - 23.0 mg/dL    Creatinine 5.18 (H) 0.51 - 0.95 mg/dL    Calcium 9.3 8.8 - 10.2 mg/dL    Glucose 149 (H) 70 - 99 mg/dL    GFR Estimate 9 (L) >60 mL/min/1.73m2   Magnesium    Collection Time: 04/06/23  7:25 AM   Result Value Ref Range    Magnesium 2.2 1.7 - 2.3 mg/dL   CBC with platelets    Collection Time: 04/06/23  7:25 AM   Result Value Ref Range    WBC Count 6.7 4.0 - 11.0 10e3/uL    RBC Count 3.09 (L) 3.80 - 5.20 10e6/uL    Hemoglobin 10.0 (L) 11.7 - 15.7 g/dL    Hematocrit 31.2 (L) 35.0 - 47.0 %     (H) 78 - 100 fL    MCH 32.4 26.5 - 33.0 pg    MCHC 32.1 31.5 - 36.5 g/dL    RDW 15.1 (H) 10.0 - 15.0 %    Platelet Count 199 150 - 450 10e3/uL   General PFT Lab (Please always keep checked)    Collection Time: 04/06/23  7:29 AM   Result Value Ref Range    FVC-Pred 2.91 L    FVC-Pre 1.62 L    FVC-%Pred-Pre 55 %    FEV1-Pre 1.41 L    FEV1-%Pred-Pre 60 %    FEV1FVC-Pred 80 %    FEV1FVC-Pre 87 %    FEFMax-Pred 6.29 L/sec    FEFMax-Pre 4.12 L/sec    FEFMax-%Pred-Pre 65 %    FEF2575-Pred 2.16 L/sec    FEF2575-Pre 1.55 L/sec    CLH7175-%Pred-Pre 71 %    ExpTime-Pre 6.36 sec    FIFMax-Pre 2.99 L/sec    FEV1FEV6-Pred 81 %    FEV1FEV6-Pre 87 %     PFT interpretation:  Maneuver: valid and met ATS guidelines  Moderate obstruction based on Z score  Compared to prior: FEV1 of 1.41 is 210 ml above prior   Decrease in FVC may be related to obstruction vs restriction; lung volumes would be necessary to determine

## 2023-04-06 ENCOUNTER — OFFICE VISIT (OUTPATIENT)
Dept: PULMONOLOGY | Facility: CLINIC | Age: 63
End: 2023-04-06
Attending: PHYSICIAN ASSISTANT
Payer: MEDICARE

## 2023-04-06 ENCOUNTER — ANCILLARY PROCEDURE (OUTPATIENT)
Dept: GENERAL RADIOLOGY | Facility: CLINIC | Age: 63
End: 2023-04-06
Attending: PHYSICIAN ASSISTANT
Payer: MEDICARE

## 2023-04-06 ENCOUNTER — LAB (OUTPATIENT)
Dept: LAB | Facility: CLINIC | Age: 63
End: 2023-04-06
Attending: PHYSICIAN ASSISTANT
Payer: MEDICARE

## 2023-04-06 VITALS — HEART RATE: 80 BPM | OXYGEN SATURATION: 99 % | SYSTOLIC BLOOD PRESSURE: 138 MMHG | DIASTOLIC BLOOD PRESSURE: 80 MMHG

## 2023-04-06 DIAGNOSIS — R18.8 OTHER ASCITES: ICD-10-CM

## 2023-04-06 DIAGNOSIS — Z94.2 S/P LUNG TRANSPLANT (H): Primary | ICD-10-CM

## 2023-04-06 DIAGNOSIS — E83.42 HYPOMAGNESEMIA: ICD-10-CM

## 2023-04-06 DIAGNOSIS — Z94.2 LUNG REPLACED BY TRANSPLANT (H): ICD-10-CM

## 2023-04-06 DIAGNOSIS — Z79.52 LONG TERM (CURRENT) USE OF SYSTEMIC STEROIDS: ICD-10-CM

## 2023-04-06 DIAGNOSIS — Z94.2 LUNG REPLACED BY TRANSPLANT (H): Primary | ICD-10-CM

## 2023-04-06 DIAGNOSIS — Z94.2 S/P LUNG TRANSPLANT (H): ICD-10-CM

## 2023-04-06 DIAGNOSIS — Z94.2 LUNG TRANSPLANT STATUS, BILATERAL (H): ICD-10-CM

## 2023-04-06 LAB
ANION GAP SERPL CALCULATED.3IONS-SCNC: 12 MMOL/L (ref 7–15)
BUN SERPL-MCNC: 69 MG/DL (ref 8–23)
CALCIUM SERPL-MCNC: 9.3 MG/DL (ref 8.8–10.2)
CHLORIDE SERPL-SCNC: 96 MMOL/L (ref 98–107)
CMV DNA SPEC NAA+PROBE-ACNC: NOT DETECTED IU/ML
CREAT SERPL-MCNC: 5.18 MG/DL (ref 0.51–0.95)
DEPRECATED HCO3 PLAS-SCNC: 29 MMOL/L (ref 22–29)
ERYTHROCYTE [DISTWIDTH] IN BLOOD BY AUTOMATED COUNT: 15.1 % (ref 10–15)
EXPTIME-PRE: 6.36 SEC
FEF2575-%PRED-PRE: 71 %
FEF2575-PRE: 1.55 L/SEC
FEF2575-PRED: 2.16 L/SEC
FEFMAX-%PRED-PRE: 65 %
FEFMAX-PRE: 4.12 L/SEC
FEFMAX-PRED: 6.29 L/SEC
FEV1-%PRED-PRE: 60 %
FEV1-PRE: 1.41 L
FEV1FEV6-PRE: 87 %
FEV1FEV6-PRED: 81 %
FEV1FVC-PRE: 87 %
FEV1FVC-PRED: 80 %
FIFMAX-PRE: 2.99 L/SEC
FVC-%PRED-PRE: 55 %
FVC-PRE: 1.62 L
FVC-PRED: 2.91 L
GFR SERPL CREATININE-BSD FRML MDRD: 9 ML/MIN/1.73M2
GLUCOSE SERPL-MCNC: 149 MG/DL (ref 70–99)
HCT VFR BLD AUTO: 31.2 % (ref 35–47)
HGB BLD-MCNC: 10 G/DL (ref 11.7–15.7)
MAGNESIUM SERPL-MCNC: 2.2 MG/DL (ref 1.7–2.3)
MCH RBC QN AUTO: 32.4 PG (ref 26.5–33)
MCHC RBC AUTO-ENTMCNC: 32.1 G/DL (ref 31.5–36.5)
MCV RBC AUTO: 101 FL (ref 78–100)
PLATELET # BLD AUTO: 199 10E3/UL (ref 150–450)
POTASSIUM SERPL-SCNC: 5.4 MMOL/L (ref 3.4–5.3)
RBC # BLD AUTO: 3.09 10E6/UL (ref 3.8–5.2)
SODIUM SERPL-SCNC: 137 MMOL/L (ref 136–145)
TACROLIMUS BLD-MCNC: 6.2 UG/L (ref 5–15)
TME LAST DOSE: NORMAL H
TME LAST DOSE: NORMAL H
WBC # BLD AUTO: 6.7 10E3/UL (ref 4–11)

## 2023-04-06 PROCEDURE — 94375 RESPIRATORY FLOW VOLUME LOOP: CPT | Performed by: PHYSICIAN ASSISTANT

## 2023-04-06 PROCEDURE — 36415 COLL VENOUS BLD VENIPUNCTURE: CPT | Performed by: PATHOLOGY

## 2023-04-06 PROCEDURE — 80197 ASSAY OF TACROLIMUS: CPT | Performed by: PHYSICIAN ASSISTANT

## 2023-04-06 PROCEDURE — 99214 OFFICE O/P EST MOD 30 MIN: CPT | Mod: 25 | Performed by: PHYSICIAN ASSISTANT

## 2023-04-06 PROCEDURE — 71046 X-RAY EXAM CHEST 2 VIEWS: CPT | Mod: GC | Performed by: RADIOLOGY

## 2023-04-06 PROCEDURE — 87799 DETECT AGENT NOS DNA QUANT: CPT | Performed by: PHYSICIAN ASSISTANT

## 2023-04-06 PROCEDURE — 83735 ASSAY OF MAGNESIUM: CPT | Performed by: PATHOLOGY

## 2023-04-06 PROCEDURE — 99000 SPECIMEN HANDLING OFFICE-LAB: CPT | Performed by: PATHOLOGY

## 2023-04-06 PROCEDURE — 84080 ASSAY ALKALINE PHOSPHATASES: CPT | Mod: 90 | Performed by: PATHOLOGY

## 2023-04-06 PROCEDURE — 84075 ASSAY ALKALINE PHOSPHATASE: CPT | Mod: 90 | Performed by: PATHOLOGY

## 2023-04-06 PROCEDURE — 80048 BASIC METABOLIC PNL TOTAL CA: CPT | Performed by: PATHOLOGY

## 2023-04-06 PROCEDURE — G0463 HOSPITAL OUTPT CLINIC VISIT: HCPCS | Performed by: PHYSICIAN ASSISTANT

## 2023-04-06 PROCEDURE — 85027 COMPLETE CBC AUTOMATED: CPT | Performed by: PATHOLOGY

## 2023-04-06 NOTE — LETTER
PHYSICIAN ORDERS      DATE & TIME ISSUED: 2023 8:39 AM  PATIENT NAME: Kecia Blue   : 1960     [facility] MR# [if applicable]: [id number]     DIAGNOSIS:  Lung Transplant  Z94.2    Colonoscopy referral- screening   (pt is having a knee replacement next week so she wants to schedule the colonoscopy sometime later this year)    Any questions please call: Mikayla     Please fax these results to [phone].        Ame Chow PA-C

## 2023-04-06 NOTE — NURSING NOTE
Transplant Coordinator Note     Reason for visit: Post lung transplant follow up visit   Coordinator: Present   Caregiver: Pt's significant otherRanjan    [facility] concerns addressed today:  1. R knee replacement next Friday.   2.     Activity/rehab: Uses a walker with activity - will work with PT again after surgery  Oxygen needs: Room air  Pain management/RX: Denies  High risk donor: Yes  CMV status: D+/R+  DVT/PE: H/o DVT  Post op AFIB/follow up with EP: One time occurrence of a-fib  PJP prophylactic: Bactrim     COVID:  1. COVID-19 infection (yes/no, date of most recent positive test):   2. Status/instructions given about COVID-19 vaccine:      Pt Education: medications (use/dose/side effects), how/when to call coordinator, frequency of labs, s/s of infection/rejection, call prior to starting any new medications, lab/vital sign book     Health Maintenance:     Last colonoscopy: 12/2012    Next colonoscopy due:     Dermatology:     Vaccinations this visit: None    Labs, CXR, PFTs reviewed with patient  Medication record reviewed and reconciled  Questions and concerns addressed    Patient Instructions  1. Continue to hydrate with 60-70 oz fluids daily.  2. Continue to exercise daily or most days of the week.  3. Follow up with your primary care provider for annual gender health maintenance procedures.  4. Follow up with colonoscopy schedule. Do this closer to home, due anytime.   5. Follow up with annual dermatology visits.  6. It doesn't seem like the COVID vaccine is working well in lung transplant patients. A number of lung transplant patients have gotten sick with COVID even after receiving the vaccines. Based on our recent experience, it can be life-threatening to get COVID even after being vaccinated. Please continue to act like you did not get the COVID vaccine - social distancing, wearing a mask, good hand hygiene, etc. If the people around you are vaccinated, it will help reduce the risk of you getting  COVID. All members of your household should be vaccinated.  7. Let dialysis know your potassium was 5.4 today.   8. Decrease prednisone to 5 mg daily.     Next transplant clinic appointment: 3 months with CXR, labs, and PFTs  Next lab draw: monthly    AVS printed at time of check out

## 2023-04-06 NOTE — PATIENT INSTRUCTIONS
Patient Instructions  1. Continue to hydrate with 60-70 oz fluids daily.  2. Continue to exercise daily or most days of the week.  3. Follow up with your primary care provider for annual gender health maintenance procedures.  4. Follow up with colonoscopy schedule. Do this closer to home, due anytime.   5. Follow up with annual dermatology visits.  6. It doesn't seem like the COVID vaccine is working well in lung transplant patients. A number of lung transplant patients have gotten sick with COVID even after receiving the vaccines. Based on our recent experience, it can be life-threatening to get COVID even after being vaccinated. Please continue to act like you did not get the COVID vaccine - social distancing, wearing a mask, good hand hygiene, etc. If the people around you are vaccinated, it will help reduce the risk of you getting COVID. All members of your household should be vaccinated.  7. Let dialysis know your potassium was 5.4 today.   8. Decrease prednisone to 5 mg daily.     Next transplant clinic appointment: 3 months with CXR, labs, and PFTs  Next lab draw: monthly

## 2023-04-06 NOTE — LETTER
4/6/2023         RE: Kecia Blue  [address] [place] [address]        Dear Colleague,    Thank you for referring your patient, Kecia Blue, to the [facility] [place]. Please see a copy of my visit note below.    [facility]  April 6, 2023         Assessment and Plan:   Kecia Blue is a 62 year old female with h/o bilateral lung transplant on 3/1/18 for ILD with antisynthetase syndrome with postoperative course complicated by right mainstem bronchial stenosis s/p several dilations, left-sided Aspergillus empyema s/p amphotericin beads, current on posaconazole indefinitely, EBV viremia, CMV viremia, C diff colitis and ESRD on HD who is seen today for pre op clearance. She is scheduled for a R TKA on 4/14.     1. Bilateral lung transplant: feeling well, has been able to do a bit more activity after her first knee replacement. No recent illnesses. Sating 99% on room air. DSA 2/9 and CMV 3/8 negative. CXR reviewed by me with stable basilar and perihilar opacities. PFTs today improved to her recent best.   - Continue AZA 25 mg daily, continue tacrolimus (goal 8-10) and prednisone, decrease to 5 mg daily for significant osteoporosis  - On Bactrim 3 times/week    2. Right main bronchial stenosis s/p ligation: with multiple bronchs in the OR, last included debulking/dilation of the RERE. No new complaints.  - Follow up with IP as needed    3. Congestive hepatopathy with fibrosis: complicated by ascites secondary to portal hypertension. Chronic elevation of alk phos. Fluid optimization with dialysis. Follows with Dr. Denise.    4. Left-sided aspergillus empyema: noted in 2019 s/p needle aspiration with Aspergillus fumigatus on cultures s/p intrapleural amphotericin bead placement. Likely with be on posaconazole for life per ID.   - Continue posaconazole    5. ESRD on iHD  (since 10/2019): continues on iHD M/W/F. Renal transplant listing on hold for her knee surgeries and likely for 6 months afterward.   - Continue metoprolol, renal VMI, phosphate binder    6. Osteoporosis: on DEXA from 2022 with significant loss of bone in her lumbar spine and femur. Complicated by steroid use and fact that her activity is severely limited by her knee pain. Vitamin D level of 40. Has Endocrine appt in April.  - Continue supplementation  - Decrease prednisone to 5 mg daily    Chronic issues:  1. Paroxysmal AFib  2. Hypomagnesemia  3. Dermatomyositis with Raynauds    RTC: 3 months  Vaccinations: UTD with flu and bivalent covid vaccine  Preventative: colonoscopy due this year; mammogram in February negative; DEXA > May 2024; following with Alexia Chow PA-C  Pulmonary, Allergy, Critical Care and Sleep Medicine        Interval History:     Done with PT for her left knee, using a wheeled walker outside, not using any assistive device in the house. No fever or chills, no recent illnesses. Breathing is good, mild shortness of breath with activity, minimal cough in the am, occasionally productive, no congestion or tightness. No chest pain or palpitations. No bloating or gas, better appetite. Stools are normal, not loose.           Review of Systems:   Please see HPI, otherwise the complete 10 point ROS is negative.           Past Medical and Surgical History:     Past Medical History:   Diagnosis Date     Acute on chronic respiratory failure with hypoxia (H) 02/21/2018     Anisocoria      Antisynthetase syndrome (H) 2014     Anxiety      Aspergillus (H)      Aspergillus pneumonia (H) 11/20/2020     Benign essential hypertension      C. difficile colitis      Cytomegalovirus (CMV) viremia (H)      Dermatomyositis (H)      Dysplasia of cervix, low grade (ESTRADA 1)      EBV (Franklin-Barr virus) viremia      ESRD (end stage renal disease) on dialysis (H)      ILD (interstitial lung disease) (H)      Lung  replaced by transplant (H)      Osteopenia      Paroxysmal atrial fibrillation (H)      Pneumocystis jiroveci pneumonia (H)      PONV (postoperative nausea and vomiting)      Post-menopause      Pulmonary hypertension (H)      Raynaud's disease      Seronegative rheumatoid arthritis (H)      Past Surgical History:   Procedure Laterality Date     BRONCHOSCOPY (RIGID OR FLEXIBLE), DIAGNOSTIC N/A 4/10/2018    Procedure: COMBINED BRONCHOSCOPY (RIGID OR FLEXIBLE), LAVAGE;;  Surgeon: Mariposa Donohue MD;  [facility]     BRONCHOSCOPY (RIGID OR FLEXIBLE), DIAGNOSTIC N/A 12/23/2020    Procedure: BRONCHOSCOPY, WITH BRONCHOALVEOLAR LAVAGE;  Surgeon: Uri Bass MD;  [facility]     BRONCHOSCOPY (RIGID OR FLEXIBLE), DIAGNOSTIC N/A 5/26/2022    Procedure: BRONCHOSCOPY, WITH BRONCHOALVEOLAR LAVAGE;  Surgeon: Uri Bass MD;  [facility]     BRONCHOSCOPY (RIGID OR FLEXIBLE), DILATE BRONCHUS / TRACHEA N/A 10/11/2018    Procedure: BRONCHOSCOPY (RIGID OR FLEXIBLE), DILATE BRONCHUS / TRACHEA;  Flexible And Rigid Bronchoscopy and Dilation;  Surgeon: Wilber Lin MD;  [facility]     BRONCHOSCOPY FLEXIBLE N/A 3/13/2018    Procedure: BRONCHOSCOPY FLEXIBLE;  Flexible Bronchoscopy ;  Surgeon: Gissell Sanchez MD;  [facility]     BRONCHOSCOPY FLEXIBLE N/A 5/9/2018    Procedure: BRONCHOSCOPY FLEXIBLE;;  Surgeon: Wilber Lin MD;  [facility]     BRONCHOSCOPY FLEXIBLE AND RIGID N/A 9/10/2018    Procedure: BRONCHOSCOPY FLEXIBLE AND RIGID;  Flexible and Rigid Bronchoscopy with Balloon Dilation, tissue debulking with cryo, and Right mainstem bronchus stent placement;  Surgeon: Wilber Lin MD;  [facility]     BRONCHOSCOPY RIGID N/A 6/6/2018    Procedure: BRONCHOSCOPY RIGID;;  Surgeon: Lopez Macias MD;  [facility]     BRONCHOSCOPY, DILATE BRONCHUS, STENT BRONCHUS, COMBINED N/A 6/11/2018    Procedure: COMBINED BRONCHOSCOPY, DILATE BRONCHUS, STENT  BRONCHUS;  Flexible Bronchoscopy, Balloon Dilation, Bronchial Washings;  Surgeon: Wilber Lin MD;  [facility]     BRONCHOSCOPY, DILATE BRONCHUS, STENT BRONCHUS, COMBINED Right 7/10/2018    Procedure: COMBINED BRONCHOSCOPY, DILATE BRONCHUS, STENT BRONCHUS;  Flexible Bronchoscopy, Balloon Dilation, Bronchial Washings  ;  Surgeon: Wilber Lin MD;  [facility]     BRONCHOSCOPY, DILATE BRONCHUS, STENT BRONCHUS, COMBINED N/A 8/2/2018    Procedure: COMBINED BRONCHOSCOPY, DILATE BRONCHUS, STENT BRONCHUS;  Flexible Bronchoscopy, Bronchial Washings, Balloon Dilation;  Surgeon: Wilber Lin MD;  [facility]     BRONCHOSCOPY, DILATE BRONCHUS, STENT BRONCHUS, COMBINED N/A 8/20/2018    Procedure: COMBINED BRONCHOSCOPY, DILATE BRONCHUS, STENT BRONCHUS;  Flexible Bronchoscopy, Balloon Dilation;  Surgeon: Wilber Lin MD;  [facility]     BRONCHOSCOPY, DILATE BRONCHUS, STENT BRONCHUS, COMBINED N/A 10/29/2018    Procedure: Flexible Bronchoscopy, Balloon Dilation, Stent Revision, Airway Examination And Therapeutic Suctioning, Cyro Tumor Debulking;  Surgeon: Wilber Lin MD;  [facility]     BRONCHOSCOPY, DILATE BRONCHUS, STENT BRONCHUS, COMBINED N/A 11/20/2018    Procedure: Rigid Bronchoscopy, Stent Removal and dilitation;  Surgeon: Wilber Lin MD;  [facility]     BRONCHOSCOPY, DILATE BRONCHUS, STENT BRONCHUS, COMBINED N/A 12/14/2018    Procedure: Flexible And Rigid Bronchoscopy, Balloon Dilation, Bronchial Washing;  Surgeon: Wilber Lin MD;  [facility]     BRONCHOSCOPY, DILATE BRONCHUS, STENT BRONCHUS, COMBINED N/A 1/17/2019    Procedure: Flexible And Rigid Bronchoscopy, Balloon Dilation.;  Surgeon: Wilber Lin MD;  [facility]     BRONCHOSCOPY, DILATE BRONCHUS, STENT BRONCHUS, COMBINED N/A 3/7/2019    Procedure: Flexible and Rigid Bronchoscopy, Bronchial Washing, Balloon Dilation;  Surgeon: Wilber Lin,  MD;  [facility]     BRONCHOSCOPY, DILATE BRONCHUS, STENT BRONCHUS, COMBINED N/A 6/6/2019    Procedure: Rigid and Flexible Bronchoscopy, Balloon Dilation;  Surgeon: Wilber Lin MD;  [facility]     BRONCHOSCOPY, DILATE BRONCHUS, STENT BRONCHUS, COMBINED N/A 10/11/2019    Procedure: Flexible and Rigid Bronchoscopy, Balloon Dilation, Bronchoalveolar Lagave;  Surgeon: Wilber Lin MD;  [facility]     BRONCHOSCOPY, DILATE BRONCHUS, STENT BRONCHUS, COMBINED N/A 2/19/2021    Procedure: BRONCHOSCOPY, flexible, airway dilation, and bronchial wash;  Surgeon: Wilber Lin MD;  [facility]     BRONCHOSCOPY, DILATE BRONCHUS, STENT BRONCHUS, COMBINED N/A 4/9/2021    Procedure: BRONCHOSCOPY, flexible and rigid, Airway suctioning;  Surgeon: Mati Norris MD;  [facility]     CV RIGHT HEART CATH MEASUREMENTS RECORDED N/A 3/10/2020    Procedure: CV RIGHT HEART CATH;  Surgeon: Wai Garcia MD;  [facility]     ENT SURGERY      tonsillectomy as a child     ESOPHAGOSCOPY, GASTROSCOPY, DUODENOSCOPY (EGD), COMBINED N/A 10/29/2018    Procedure: COMBINED ESOPHAGOSCOPY, GASTROSCOPY, DUODENOSCOPY (EGD) with biopsies and polypectomy;  Surgeon: Chente Bloom MD;  [facility]     INSERT EXTRACORPORAL MEMBRANE OXYGENATOR ADULT (OUTSIDE OR) N/A 2/27/2018    Procedure: INSERT EXTRACORPORAL MEMBRANE OXYGENATOR ADULT (OUTSIDE OR);  INSERT EXTRACORPORAL MEMBRANE OXYGENATOR ADULT (OUTSIDE OR) ;  Surgeon: Toby Hernandez MD;  [facility]     IR CVC TUNNEL PLACEMENT > 5 YRS OF AGE  10/25/2019     IR DIALYSIS FISTULOGRAM LEFT  3/2/2021     IR DIALYSIS MECH THROMB, PTA  3/2/2021     IR FLUORO 0-1 HOUR  5/7/2021     IR GASTRO JEJUNOSTOMY TUBE PLACEMENT  2/16/2021     IR PARACENTESIS  1/8/2020     IR THORACENTESIS  9/13/2019     IR TRANSCATHETER BIOPSY  1/8/2020     LASER CO2 BRONCHOSCOPY N/A 4/30/2021    Procedure: Flexible and Rigid  Bronchoscopy and Tissue Debulking with CO2 Laser Assistance;  Surgeon: Mati Norris MD;  [facility]     LASER CO2 BRONCHOSCOPY N/A 6/11/2021    Procedure: BRONCHOSCOPY, Flexible and Rigid Bronchoscopy, Tissue Debulking with cryo Assistance, airway dilation,;  Surgeon: Mati Norris MD;  [facility]     LASER CO2 BRONCHOSCOPY N/A 9/16/2021    Procedure: BRONCHOSCOPY, flexible and rigid, CO2 Laser Debulking;  Surgeon: Mati Norris MD;  [facility]     LASER CO2 BRONCHOSCOPY N/A 2/11/2022    Procedure: flexible, rigid bronchoscopy, tissue debulking, airway dilation, co2 laser, bronchoalveolar lavage;  Surgeon: Juana Baugh MD;  [facility]     no prior surgery       REMOVE EXTRACORPORAL MEMBRANE OXYGENATOR ADULT N/A 3/3/2018    Procedure: REMOVE EXTRACORPORAL MEMBRANE OXYGENATOR ADULT;  Removal of Right Femoral Venous and Right Axillary Arterial Extracorporeal Membrane Oxygenator;  Surgeon: Toby Hernandez MD;  [facility]     TRANSPLANT LUNG RECIPIENT SINGLE X2 Bilateral 3/1/2018    Procedure: TRANSPLANT LUNG RECIPIENT SINGLE X2;  Median Sternotomy, Extracorporeal Membrane Oxygenator, bilateral sequential lung transplant;  Surgeon: Toby Hernandez MD;  [facility]           Family History:     Family History   Problem Relation Age of Onset     Hypertension Mother      Arthritis Mother      Pancreatic Cancer Father      Diabetes Father             Social History:     Social History     Socioeconomic History     Marital status:      Spouse name: Not on file     Number of children: Not on file     Years of education: Not on file     Highest education level: Not on file   Occupational History     Not on file   Tobacco Use     Smoking status: Never     Smokeless tobacco: Never   Vaping Use     Vaping status: Not on file   Substance and Sexual Activity     Alcohol use: No     Alcohol/week: 1.0 standard drink of alcohol     Types: 1 Glasses of wine per week      Drug use: No     Sexual activity: Not on file   Other Topics Concern     Parent/sibling w/ CABG, MI or angioplasty before 65F 55M? No   Social History Narrative    3/6/2019 - Lives with . Has three daughters. Four grandchildren (two ). No pets. Travelled previously to [place]. Has visited [place] several times.      Social Determinants of Health     Financial Resource Strain: Not on file   Food Insecurity: Not on file   Transportation Needs: Not on file   Physical Activity: Not on file   Stress: Not on file   Social Connections: Not on file   Intimate Partner Violence: Not on file   Housing Stability: Not on file            Medications:     Current Outpatient Medications   Medication     acetaminophen (TYLENOL) 325 MG tablet     albuterol (PROVENTIL) (2.5 MG/3ML) 0.083% neb solution     azaTHIOprine (IMURAN) 50 MG tablet     budesonide (PULMICORT) 0.5 MG/2ML neb solution     calcium acetate (PHOSLO) 667 MG CAPS capsule     Magnesium Cl-Calcium Carbonate (SLOW-MAG) 71.5-119 MG TBEC     metoprolol tartrate (LOPRESSOR) 25 MG tablet     multivitamin RENAL (RENAVITE RX/NEPHROVITE) 1 MG tablet     pantoprazole (PROTONIX) 40 MG EC tablet     posaconazole (NOXAFIL) 100 MG EC tablet     predniSONE (DELTASONE) 5 MG tablet     sulfamethoxazole-trimethoprim (BACTRIM) 400-80 MG tablet     tacrolimus (GENERIC EQUIVALENT) 0.5 MG capsule     No current facility-administered medications for this visit.            Physical Exam:   /80   Pulse 80   LMP 2014 (Exact Date)   SpO2 99%     GENERAL: alert, NAD  HEENT: NCAT, EOMI, no scleral icterus, oral mucosa moist and without lesions  Neck: no cervical or supraclavicular adenopathy  Lungs: moderate airflow, mainly clear  CV: irregular rhythm, S1S2, no murmurs noted  Abdomen: normoactive BS, soft  Lymph: no edema  Neuro: AAO X 3, CN 2-12 grossly intact  Psychiatric: normal affect, good eye contact  Skin: no rash, jaundice or lesions on limited  exam         Data:   All laboratory and imaging data reviewed.      Recent Results (from the past 168 hour(s))   Basic metabolic panel    Collection Time: 04/06/23  7:25 AM   Result Value Ref Range    Sodium 137 136 - 145 mmol/L    Potassium 5.4 (H) 3.4 - 5.3 mmol/L    Chloride 96 (L) 98 - 107 mmol/L    Carbon Dioxide (CO2) 29 22 - 29 mmol/L    Anion Gap 12 7 - 15 mmol/L    Urea Nitrogen 69.0 (H) 8.0 - 23.0 mg/dL    Creatinine 5.18 (H) 0.51 - 0.95 mg/dL    Calcium 9.3 8.8 - 10.2 mg/dL    Glucose 149 (H) 70 - 99 mg/dL    GFR Estimate 9 (L) >60 mL/min/1.73m2   Magnesium    Collection Time: 04/06/23  7:25 AM   Result Value Ref Range    Magnesium 2.2 1.7 - 2.3 mg/dL   CBC with platelets    Collection Time: 04/06/23  7:25 AM   Result Value Ref Range    WBC Count 6.7 4.0 - 11.0 10e3/uL    RBC Count 3.09 (L) 3.80 - 5.20 10e6/uL    Hemoglobin 10.0 (L) 11.7 - 15.7 g/dL    Hematocrit 31.2 (L) 35.0 - 47.0 %     (H) 78 - 100 fL    MCH 32.4 26.5 - 33.0 pg    MCHC 32.1 31.5 - 36.5 g/dL    RDW 15.1 (H) 10.0 - 15.0 %    Platelet Count 199 150 - 450 10e3/uL   General PFT Lab (Please always keep checked)    Collection Time: 04/06/23  7:29 AM   Result Value Ref Range    FVC-Pred 2.91 L    FVC-Pre 1.62 L    FVC-%Pred-Pre 55 %    FEV1-Pre 1.41 L    FEV1-%Pred-Pre 60 %    FEV1FVC-Pred 80 %    FEV1FVC-Pre 87 %    FEFMax-Pred 6.29 L/sec    FEFMax-Pre 4.12 L/sec    FEFMax-%Pred-Pre 65 %    FEF2575-Pred 2.16 L/sec    FEF2575-Pre 1.55 L/sec    [id number]-%Pred-Pre 71 %    ExpTime-Pre 6.36 sec    FIFMax-Pre 2.99 L/sec    FEV1FEV6-Pred 81 %    FEV1FEV6-Pre 87 %     PFT interpretation:  Maneuver: valid and met ATS guidelines  Moderate obstruction based on Z score  Compared to prior: FEV1 of 1.41 is 210 ml above prior   Decrease in FVC may be related to obstruction vs restriction; lung volumes would be necessary to determine      Again, thank you for allowing me to participate in the care of your patient.        Sincerely,        Ame Patel  JONI Chow

## 2023-04-06 NOTE — NURSING NOTE
Chief Complaint   Patient presents with     Follow Up     ltx     Vitals were taken and medications were reconciled.     KVNG Gabriel

## 2023-04-06 NOTE — RESULT ENCOUNTER NOTE
Armand Mcdonnell,   Your tacrolimus level was 6.2 at 12 hours on 4/6/23 which is a little below your goal. Since your level was too high at the higher dose, let's just keep your dose the same for now and recheck a level in a few weeks and we'll adjust then if needed. Please call the transplant office ([phone]) with any questions.   Thanks,   Mikayla

## 2023-04-07 LAB
EBV DNA COPIES/ML, INSTRUMENT: ABNORMAL COPIES/ML
EBV DNA SPEC NAA+PROBE-LOG#: 4.6 {LOG_COPIES}/ML

## 2023-04-08 LAB
ALP BONE SERPL-CCNC: 106 U/L
ALP LIVER SERPL-CCNC: 152 U/L
ALP OTHER SERPL-CCNC: 0 U/L
ALP SERPL-CCNC: 258 U/L

## 2023-04-13 ENCOUNTER — TELEPHONE (OUTPATIENT)
Dept: TRANSPLANT | Facility: CLINIC | Age: 63
End: 2023-04-13
Payer: MEDICARE

## 2023-04-13 DIAGNOSIS — K21.9 GASTROESOPHAGEAL REFLUX DISEASE WITHOUT ESOPHAGITIS: ICD-10-CM

## 2023-04-13 DIAGNOSIS — Z94.2 LUNG REPLACED BY TRANSPLANT (H): ICD-10-CM

## 2023-04-13 RX ORDER — PANTOPRAZOLE SODIUM 40 MG/1
40 TABLET, DELAYED RELEASE ORAL
Qty: 30 TABLET | Refills: 11 | Status: SHIPPED | OUTPATIENT
Start: 2023-04-13 | End: 2023-04-19

## 2023-04-13 NOTE — TELEPHONE ENCOUNTER
Patient called asking for a refill for pantoprazole (PROTONIX) 40 MG EC tablet, to be sent too [id number] - [place], [place] - [address] AT [facility]   Phone:  [phone]  Fax:  [phone]

## 2023-04-17 NOTE — PROGRESS NOTES
Outcome for 04/17/23 7:41 AM: [employer] message sent  Kortney Parson MA  Outcome for 04/19/23 7:03 AM: Patient is not checking blood sugars  Kortney Parson MA

## 2023-04-19 DIAGNOSIS — Z94.2 LUNG REPLACED BY TRANSPLANT (H): ICD-10-CM

## 2023-04-19 DIAGNOSIS — K21.9 GASTROESOPHAGEAL REFLUX DISEASE WITHOUT ESOPHAGITIS: ICD-10-CM

## 2023-04-19 RX ORDER — PANTOPRAZOLE SODIUM 40 MG/1
40 TABLET, DELAYED RELEASE ORAL
Qty: 30 TABLET | Refills: 11 | Status: SHIPPED | OUTPATIENT
Start: 2023-04-19 | End: 2024-01-01

## 2023-04-24 ENCOUNTER — VIRTUAL VISIT (OUTPATIENT)
Dept: ENDOCRINOLOGY | Facility: CLINIC | Age: 63
End: 2023-04-24
Attending: PHYSICIAN ASSISTANT
Payer: MEDICARE

## 2023-04-24 DIAGNOSIS — D84.9 IMMUNOSUPPRESSED STATUS (H): ICD-10-CM

## 2023-04-24 DIAGNOSIS — N18.6 ESRD (END STAGE RENAL DISEASE) ON DIALYSIS (H): ICD-10-CM

## 2023-04-24 DIAGNOSIS — Z79.52 CURRENT CHRONIC USE OF SYSTEMIC STEROIDS: ICD-10-CM

## 2023-04-24 DIAGNOSIS — Z94.2 S/P LUNG TRANSPLANT (H): ICD-10-CM

## 2023-04-24 DIAGNOSIS — M81.0 AGE-RELATED OSTEOPOROSIS WITHOUT CURRENT PATHOLOGICAL FRACTURE: Primary | ICD-10-CM

## 2023-04-24 DIAGNOSIS — Z99.2 ESRD (END STAGE RENAL DISEASE) ON DIALYSIS (H): ICD-10-CM

## 2023-04-24 PROCEDURE — 99205 OFFICE O/P NEW HI 60 MIN: CPT | Mod: VID | Performed by: INTERNAL MEDICINE

## 2023-04-24 RX ORDER — EPINEPHRINE 1 MG/ML
0.3 INJECTION, SOLUTION INTRAMUSCULAR; SUBCUTANEOUS EVERY 5 MIN PRN
Status: CANCELLED | OUTPATIENT
Start: 2023-04-24

## 2023-04-24 RX ORDER — ALBUTEROL SULFATE 0.83 MG/ML
2.5 SOLUTION RESPIRATORY (INHALATION)
Status: CANCELLED | OUTPATIENT
Start: 2023-04-24

## 2023-04-24 RX ORDER — ALBUTEROL SULFATE 90 UG/1
1-2 AEROSOL, METERED RESPIRATORY (INHALATION)
Status: CANCELLED
Start: 2023-04-24

## 2023-04-24 RX ORDER — MEPERIDINE HYDROCHLORIDE 25 MG/ML
25 INJECTION INTRAMUSCULAR; INTRAVENOUS; SUBCUTANEOUS EVERY 30 MIN PRN
Status: CANCELLED | OUTPATIENT
Start: 2023-04-24

## 2023-04-24 RX ORDER — DIPHENHYDRAMINE HYDROCHLORIDE 50 MG/ML
50 INJECTION INTRAMUSCULAR; INTRAVENOUS
Status: CANCELLED
Start: 2023-04-24

## 2023-04-24 ASSESSMENT — ENCOUNTER SYMPTOMS
DOUBLE VISION: 0
POOR WOUND HEALING: 0
NAIL CHANGES: 0
EYE PAIN: 0
SKIN CHANGES: 0
EYE WATERING: 0
EYE REDNESS: 1
EYE IRRITATION: 0

## 2023-04-24 NOTE — PROGRESS NOTES
Endocrinology Clinic Visit    Chief Complaint: New Patient and Video Visit     Information obtained from:Patient      Assessment/Treatment Plan:    Osteoporosis   ESRD currently on hemodialysis  Chronic steroid therapy-prednisone 7.5 mg daily longstanding and recently changed to 5 mg daily  Status post lung transplant    I have personally reviewed bone density from 5/22/2022.  I agree with the interpretation of lumbar T score -3.8, left femoral neck -3.2, right femoral neck -3.5, left and right total femur -4.4 and -4.4 respectively.  Risk factor for osteoporosis includes 8 years of treatment with high-dose chronic steroid therapy.  Has not had bone specific therapy previously.  Patient has also ESRD and chronic medical conditions that are known to increase risk of osteoporosis.  For additional risk assessment we are going to check the following labs.  Most importantly to prevent fractures patient needs bone specific therapy and the options are limited because of ESRD.  After discussing the risk and benefit including osteonecrosis of the jaw, immunosuppression and risk of pneumonia, hypocalcemia and other risk factors decision was made to proceed with Prolia injection every 6 months.  Admit therapy for Prolia placed and clinic team will call and schedule the appointment for her.  Continue current calcium of at least 1200 mg daily from all sources, current treatment with vitamin D of 1000 IU daily at least and further determination regarding vitamin D supplement based on the blood work we will be doing today.  Strengthening exercises as much as possible and formulation addressed.  The hope is patient might receive kidney transplant and would receive consolidative treatment with other osteoporosis medications once transplant is done.  For the time being Prolia every 6 months for at least 3-5 years.  Of note, the possibility of CKD BMD entertained however this patient has a risk for osteoporosis which is a chronic  steroid therapy therefore we have proceeded treatment with osteoporosis given verification for CKD BMD needs invasive procedure which is bone biopsy.      Return to clinic in 8 months.    Test and/or medications prescribed today:  Orders Placed This Encounter   Procedures     25 Hydroxyvitamin D2 and D3     Albumin level     Calcium     Tissue transglutaminase bette IgA and IgG     TSH with free T4 reflex         Lewis Patel MD  Staff Endocrinologist    Division of Endocrinology and Diabetes      Subjective:         HPI: Kecia Blue is a 62 year old female with history of osteoporosis who is seen in consultation at Amejustice Chow's request for the same.  Fracture risk and osteoporosis  Previous fracture after the age of 50- no  Age >65 - no   Loss of height - Yes, an inch  Low body mass index; weight less than 127 pounds I- no   Family/Parental history of hip fracture/Osteoporosis -GM  Smoking   Early menopause   Previous fragility fracture, morphometric vertebral fracture:  Current glucocorticoid treatment: >5 mg prednisone for 3 mo or longer; current or previously- 8 years = prednisone 7.5 for years then recently changed to 5 mg daily.   Dialysis 3 + years   Excessive Alcohol intake  Falls/risk factors     Spine image/ if height loss:    Secondary causes of osteoporosis:   RA  Hypogonadism or premature menopause/aromatase inhibitor therapy, androgen inhibitor therapy, Lupron therapy, Depo-Provera use.   Prolonged immobility   Organ transplantation   Type I diabetes   Hyperthyroidism   GI disease/inflammatory bowel disease   Chronic liver disease   COPD       Calcium and vitamin D intake  calcium acetate (PHOSLO) 667 MG CAPS capsule   5/26/2022  No   Sig - Route: Take 1 capsule (667 mg) by mouth 3 times daily (with meals) (largest meal) - Oral   MVT  Vitamin D three times per week - 400 international unit(s)  - level 40 11 months ago     Exercise - no - very little     Dental procedure planned. -  no     Results   Lumbar spine   T-score -3.8 ., BMD is 0.730 g/cm2.      Left femoral neck  T-score -3.2      Right femoral neck  T-score -3.5      Left Total femur  T-score -4.4 , BMD is 0.451 g/cm2.     Right total femur  T-score -4.4, BMD is 0.455 g/cm2.     Answers for HPI/ROS submitted by the patient on 4/24/2023  General Symptoms: No  Skin Symptoms: Yes  HENT Symptoms: No  EYE SYMPTOMS: Yes  HEART SYMPTOMS: No  LUNG SYMPTOMS: No  INTESTINAL SYMPTOMS: No  URINARY SYMPTOMS: No  GYNECOLOGIC SYMPTOMS: No  BREAST SYMPTOMS: No  SKELETAL SYMPTOMS: No  BLOOD SYMPTOMS: No  NERVOUS SYSTEM SYMPTOMS: No  MENTAL HEALTH SYMPTOMS: No  Changes in hair: No  Changes in moles/birth marks: No  Itching: Yes  Rashes: No  Changes in nails: No  Acne: No  Hair in places you don't want it: No  Change in facial hair: No  Warts: No  Non-healing sores: No  Scarring: No  Flaking of skin: Yes  Color changes of hands/feet in cold : No  Sun sensitivity: No  Skin thickening: No  Eye pain: No  Vision loss: No  Dry eyes: Yes  Watery eyes: No  Eye bulging: No  Double vision: No  Flashing of lights: No  Spots: No  Floaters: No  Redness: Yes  Crossed eyes: No  Tunnel Vision: No  Yellowing of eyes: No  Eye irritation: No       No Known Allergies    Current Outpatient Medications   Medication Sig Dispense Refill     acetaminophen (TYLENOL) 325 MG tablet Take 1 tablet (325 mg) by mouth every 4 hours as needed for mild pain or fever 60 tablet      albuterol (PROVENTIL) (2.5 MG/3ML) 0.083% neb solution Take 1 vial (2.5 mg) by nebulization every 6 hours as needed for shortness of breath, wheezing or cough 360 mL 4     azaTHIOprine (IMURAN) 50 MG tablet Take 0.5 tablets (25 mg) by mouth daily 45 tablet 3     budesonide (PULMICORT) 0.5 MG/2ML neb solution Take 2 mLs (0.5 mg) by nebulization daily as needed 60 mL 11     calcium acetate (PHOSLO) 667 MG CAPS capsule Take 1 capsule (667 mg) by mouth 3 times daily (with meals) (largest meal)       Magnesium  Cl-Calcium Carbonate (SLOW-MAG) 71.5-119 MG TBEC Take 3 tablets by mouth four times a week Take on non dialysis days T/Th/Sa/Su 90 tablet 3     metoprolol tartrate (LOPRESSOR) 25 MG tablet 1 tablet (25 mg) by Oral or Feeding Tube route 2 times daily 60 tablet 11     multivitamin RENAL (RENAVITE RX/NEPHROVITE) 1 MG tablet Take 1 tablet by mouth daily 30 tablet 11     pantoprazole (PROTONIX) 40 MG EC tablet Take 1 tablet (40 mg) by mouth every morning (before breakfast) 30 tablet 11     posaconazole (NOXAFIL) 100 MG EC tablet Take 3 tablets (300 mg) by mouth daily 90 tablet 11     predniSONE (DELTASONE) 5 MG tablet Take 1 tablet (5 mg) by mouth every morning 30 tablet 11     sulfamethoxazole-trimethoprim (BACTRIM) 400-80 MG tablet Take 1 tablet by mouth Every Mon, Wed, Fri Morning 39 tablet 3     tacrolimus (GENERIC EQUIVALENT) 0.5 MG capsule Take 1 capsule (0.5 mg) by mouth 2 times daily 60 capsule 11       Review of Systems     11 point review system (Constitutional, HENT, Eyes, Respiratory, Cardiovascular, Gastrointestinal, Genitourinary, Musculoskeletal,Neurological, Psychiatric/Behavioural, Endocrine) is negative or is as per HPI above.  Pertinent to the visit past medical history, past surgical history, social and family history reviewed.        Objective:   LMP 06/07/2014 (Exact Date)   GENERAL:  alert and no distress  EYES: Eyes grossly normal to inspection.    RESP: No audible wheeze, cough, or visible cyanosis.    PSYCH: Mentation appears normal, affect normal/bright, judgement and insight intact, normal speech and appearance well-groomed.    In House Labs:   Lab Results   Component Value Date    A1C 5.2 05/26/2022    A1C 5.8 09/15/2021    A1C 6.0 01/24/2021    A1C 5.5 03/01/2018    A1C Canceled, Test credited 03/01/2018    A1C 5.3 02/27/2018       TSH   Date Value Ref Range Status   08/01/2018 1.80 0.40 - 4.00 mU/L Final   02/19/2018 3.07 0.40 - 4.00 mU/L Final       Creatinine   Date Value Ref Range Status    04/06/2023 5.18 (H) 0.51 - 0.95 mg/dL Final   05/07/2021 3.90 (H) 0.52 - 1.04 mg/dL Final   ]     Ref Range & Units 1 yr ago   Parathyroid Hormone, Intact 11.0 - 101.0 pg/mL 438.3 High        Outside labs for vitamin D reviewed.  Over the last 3 years vitamin D range between 38-44.    This note has been dictated using voice recognition software.  As a result, there may be errors in the documentation that have gone undetected.  Please consider this when interpreting information in this documentation.      Video-Visit Details    Type of service:  Video Visit    Video Start Time: 8:00 AM    Video End Time:8:24 AM    Originating Location (pt. Location): Home    Distant Location (provider location):  Off-site.     Platform used for Video Visit: [employer]  55 minutes spent by me on the date of the encounter doing chart review, history, documentation, counseling on the management of osteoporosis and further activities per the note.

## 2023-04-24 NOTE — LETTER
4/24/2023         RE: Kecia Blue  [address] [place] [address]        Dear Colleague,    Thank you for referring your patient, Kecia Blue, to the [facility]. Please see a copy of my visit note below.    Outcome for 04/17/23 7:41 AM: [employer] message sent  Kortney Parson MA  Outcome for 04/19/23 7:03 AM: Patient is not checking blood sugars  Kortney Parson MA        Endocrinology Clinic Visit    Chief Complaint: New Patient and Video Visit     Information obtained from:Patient      Assessment/Treatment Plan:    Osteoporosis   ESRD currently on hemodialysis  Chronic steroid therapy-prednisone 7.5 mg daily longstanding and recently changed to 5 mg daily  Status post lung transplant    I have personally reviewed bone density from 5/22/2022.  I agree with the interpretation of lumbar T score -3.8, left femoral neck -3.2, right femoral neck -3.5, left and right total femur -4.4 and -4.4 respectively.  Risk factor for osteoporosis includes 8 years of treatment with high-dose chronic steroid therapy.  Has not had bone specific therapy previously.  Patient has also ESRD and chronic medical conditions that are known to increase risk of osteoporosis.  For additional risk assessment we are going to check the following labs.  Most importantly to prevent fractures patient needs bone specific therapy and the options are limited because of ESRD.  After discussing the risk and benefit including osteonecrosis of the jaw, immunosuppression and risk of pneumonia, hypocalcemia and other risk factors decision was made to proceed with Prolia injection every 6 months.  Admit therapy for Prolia placed and clinic team will call and schedule the appointment for her.  Continue current calcium of at least 1200 mg daily from all sources, current treatment with vitamin D of 1000 IU daily at least and further determination regarding vitamin D supplement based on the blood work we  will be doing today.  Strengthening exercises as much as possible and formulation addressed.  The hope is patient might receive kidney transplant and would receive consolidative treatment with other osteoporosis medications once transplant is done.  For the time being Prolia every 6 months for at least 3-5 years.  Of note, the possibility of CKD BMD entertained however this patient has a risk for osteoporosis which is a chronic steroid therapy therefore we have proceeded treatment with osteoporosis given verification for CKD BMD needs invasive procedure which is bone biopsy.      Return to clinic in 8 months.    Test and/or medications prescribed today:  Orders Placed This Encounter   Procedures     25 Hydroxyvitamin D2 and D3     Albumin level     Calcium     Tissue transglutaminase bette IgA and IgG     TSH with free T4 reflex         Lewis Patel MD  Staff Endocrinologist    Division of Endocrinology and Diabetes      Subjective:         HPI: Kecia Blue is a 62 year old female with history of osteoporosis who is seen in consultation at Ame Chow's request for the same.  Fracture risk and osteoporosis  Previous fracture after the age of 50- no  Age >65 - no   Loss of height - Yes, an inch  Low body mass index; weight less than 127 pounds I- no   Family/Parental history of hip fracture/Osteoporosis -GM  Smoking   Early menopause   Previous fragility fracture, morphometric vertebral fracture:  Current glucocorticoid treatment: >5 mg prednisone for 3 mo or longer; current or previously- 8 years = prednisone 7.5 for years then recently changed to 5 mg daily.   Dialysis 3 + years   Excessive Alcohol intake  Falls/risk factors     Spine image/ if height loss:    Secondary causes of osteoporosis:   RA  Hypogonadism or premature menopause/aromatase inhibitor therapy, androgen inhibitor therapy, Lupron therapy, Depo-Provera use.   Prolonged immobility   Organ transplantation   Type I diabetes    Hyperthyroidism   GI disease/inflammatory bowel disease   Chronic liver disease   COPD       Calcium and vitamin D intake  calcium acetate (PHOSLO) 667 MG CAPS capsule   5/26/2022  No   Sig - Route: Take 1 capsule (667 mg) by mouth 3 times daily (with meals) (largest meal) - Oral   MVT  Vitamin D three times per week - 400 international unit(s)  - level 40 11 months ago     Exercise - no - very little     Dental procedure planned. - no     Results   Lumbar spine   T-score -3.8 ., BMD is 0.730 g/cm2.      Left femoral neck  T-score -3.2      Right femoral neck  T-score -3.5      Left Total femur  T-score -4.4 , BMD is 0.451 g/cm2.     Right total femur  T-score -4.4, BMD is 0.455 g/cm2.     Answers for HPI/ROS submitted by the patient on 4/24/2023  General Symptoms: No  Skin Symptoms: Yes  HENT Symptoms: No  EYE SYMPTOMS: Yes  HEART SYMPTOMS: No  LUNG SYMPTOMS: No  INTESTINAL SYMPTOMS: No  URINARY SYMPTOMS: No  GYNECOLOGIC SYMPTOMS: No  BREAST SYMPTOMS: No  SKELETAL SYMPTOMS: No  BLOOD SYMPTOMS: No  NERVOUS SYSTEM SYMPTOMS: No  MENTAL HEALTH SYMPTOMS: No  Changes in hair: No  Changes in moles/birth marks: No  Itching: Yes  Rashes: No  Changes in nails: No  Acne: No  Hair in places you don't want it: No  Change in facial hair: No  Warts: No  Non-healing sores: No  Scarring: No  Flaking of skin: Yes  Color changes of hands/feet in cold : No  Sun sensitivity: No  Skin thickening: No  Eye pain: No  Vision loss: No  Dry eyes: Yes  Watery eyes: No  Eye bulging: No  Double vision: No  Flashing of lights: No  Spots: No  Floaters: No  Redness: Yes  Crossed eyes: No  Tunnel Vision: No  Yellowing of eyes: No  Eye irritation: No       No Known Allergies    Current Outpatient Medications   Medication Sig Dispense Refill     acetaminophen (TYLENOL) 325 MG tablet Take 1 tablet (325 mg) by mouth every 4 hours as needed for mild pain or fever 60 tablet      albuterol (PROVENTIL) (2.5 MG/3ML) 0.083% neb solution Take 1 vial (2.5 mg)  by nebulization every 6 hours as needed for shortness of breath, wheezing or cough 360 mL 4     azaTHIOprine (IMURAN) 50 MG tablet Take 0.5 tablets (25 mg) by mouth daily 45 tablet 3     budesonide (PULMICORT) 0.5 MG/2ML neb solution Take 2 mLs (0.5 mg) by nebulization daily as needed 60 mL 11     calcium acetate (PHOSLO) 667 MG CAPS capsule Take 1 capsule (667 mg) by mouth 3 times daily (with meals) (largest meal)       Magnesium Cl-Calcium Carbonate (SLOW-MAG) 71.5-119 MG TBEC Take 3 tablets by mouth four times a week Take on non dialysis days T/Th/Sa/Su 90 tablet 3     metoprolol tartrate (LOPRESSOR) 25 MG tablet 1 tablet (25 mg) by Oral or Feeding Tube route 2 times daily 60 tablet 11     multivitamin RENAL (RENAVITE RX/NEPHROVITE) 1 MG tablet Take 1 tablet by mouth daily 30 tablet 11     pantoprazole (PROTONIX) 40 MG EC tablet Take 1 tablet (40 mg) by mouth every morning (before breakfast) 30 tablet 11     posaconazole (NOXAFIL) 100 MG EC tablet Take 3 tablets (300 mg) by mouth daily 90 tablet 11     predniSONE (DELTASONE) 5 MG tablet Take 1 tablet (5 mg) by mouth every morning 30 tablet 11     sulfamethoxazole-trimethoprim (BACTRIM) 400-80 MG tablet Take 1 tablet by mouth Every Mon, Wed, Fri Morning 39 tablet 3     tacrolimus (GENERIC EQUIVALENT) 0.5 MG capsule Take 1 capsule (0.5 mg) by mouth 2 times daily 60 capsule 11       Review of Systems     11 point review system (Constitutional, HENT, Eyes, Respiratory, Cardiovascular, Gastrointestinal, Genitourinary, Musculoskeletal,Neurological, Psychiatric/Behavioural, Endocrine) is negative or is as per HPI above.  Pertinent to the visit past medical history, past surgical history, social and family history reviewed.        Objective:   LMP 06/07/2014 (Exact Date)   GENERAL:  alert and no distress  EYES: Eyes grossly normal to inspection.    RESP: No audible wheeze, cough, or visible cyanosis.    PSYCH: Mentation appears normal, affect normal/bright, judgement and  insight intact, normal speech and appearance well-groomed.    In House Labs:   Lab Results   Component Value Date    A1C 5.2 05/26/2022    A1C 5.8 09/15/2021    A1C 6.0 01/24/2021    A1C 5.5 03/01/2018    A1C Canceled, Test credited 03/01/2018    A1C 5.3 02/27/2018       TSH   Date Value Ref Range Status   08/01/2018 1.80 0.40 - 4.00 mU/L Final   02/19/2018 3.07 0.40 - 4.00 mU/L Final       Creatinine   Date Value Ref Range Status   04/06/2023 5.18 (H) 0.51 - 0.95 mg/dL Final   05/07/2021 3.90 (H) 0.52 - 1.04 mg/dL Final   ]     Ref Range & Units 1 yr ago   Parathyroid Hormone, Intact 11.0 - 101.0 pg/mL 438.3 High        Outside labs for vitamin D reviewed.  Over the last 3 years vitamin D range between 38-44.    This note has been dictated using voice recognition software.  As a result, there may be errors in the documentation that have gone undetected.  Please consider this when interpreting information in this documentation.      Video-Visit Details    Type of service:  Video Visit    Video Start Time: 8:00 AM    Video End Time:8:24 AM    Originating Location (pt. Location): Home    Distant Location (provider location):  Off-site.     Platform used for Video Visit: [employer]  55 minutes spent by me on the date of the encounter doing chart review, history, documentation, counseling on the management of osteoporosis and further activities per the note.       Again, thank you for allowing me to participate in the care of your patient.        Sincerely,        Lewis Patel MD

## 2023-04-24 NOTE — NURSING NOTE
Is the patient currently in the state of [place]? YES    Visit mode:VIDEO    If the visit is dropped, the patient can be reconnected by: VIDEO VISIT: Text to cell phone: [phone]    Will anyone else be joining the visit? NO      How would you like to obtain your AVS? MyChart    Are changes needed to the allergy or medication list? NO    Reason for visit: New Patient and Video Visit

## 2023-04-24 NOTE — PATIENT INSTRUCTIONS
Weight bearing Exercises; walking   Fall prevention  Adequate Calcium and vitamin D intake: for maintenance calcium 1200 mg daily from all sources & continue current dose of  vitamin D 1000 IU daily.  Prolia injection every 6 months.  Please note that this medication should be given every 6 months without missing a dose.  The hope is after possible kidney transplant will be able to switch Prolia injection after 3-5 years to other consolidative treatment.  Please see the following information on Prolia in addition to risk-benefit of Prolia that we discussed at the time of your follow-up.    Prolia is used to treat osteoporosis in women after menopause  How should I use this medicine?  This medicine is for injection under the skin. It is given by a health care professional in a hospital or clinic setting.  A special MedGuide will be given to you before each treatment. Be sure to read this information carefully each time.    What side effects may I notice from receiving this medicine?  Side effects that you should report to your doctor or health care professional as soon as possible:  allergic reactions like skin rash, itching or hives, swelling of the face, lips, or tongue  bone pain  breathing problems  dizziness  jaw pain, especially after dental work (osteonecrosis of the Jaw)  redness, blistering, peeling of the skin  signs and symptoms of infection like fever or chills; cough; sore throat; pain or trouble passing urine  signs of low calcium like fast heartbeat, muscle cramps or muscle pain; pain, tingling, numbness in the hands or feet; seizures  unusual bleeding or bruising  unusually weak or tired  Atypical fracture of the hip      What may interact with this medicine?  Do not take this medicine with any of the following medications:  other medicines containing denosumab  This medicine may also interact with the following medications:  medicines that lower your chance of fighting infection  steroid medicines  like prednisone or cortisone  What if I miss a dose?  It is important not to miss your dose.   What should I tell my health care provider before I take this medicine?  They need to know if you have any of these conditions:  dental disease  having surgery or tooth extraction  infection  kidney disease  low levels of calcium or Vitamin D in the blood  malnutrition  on hemodialysis  skin conditions or sensitivity  thyroid or parathyroid disease  an unusual reaction to denosumab, other medicines, foods, dyes, or preservatives  pregnant or trying to get pregnant  breast-feeding  What should I watch for while using this medicine?  Visit your doctor or health care professional for regular checks on your progress. Your doctor or health care professional may order blood tests and other tests to see how you are doing.  Call your doctor or health care professional for advice if you get a fever, chills or sore throat, or other symptoms of a cold or flu. Do not treat yourself. This drug may decrease your body's ability to fight infection. Try to avoid being around people who are sick.  You should make sure you get enough calcium and vitamin D while you are taking this medicine, unless your doctor tells you not to. Discuss the foods you eat and the vitamins you take with your health care professional.  See your dentist regularly. Brush and floss your teeth as directed. Before you have any dental work done, tell your dentist you are receiving this medicine.  Do not become pregnant while taking this medicine or for 5 months after stopping it. Talk with your doctor or health care professional about your birth control options while taking this medicine. Women should inform their doctor if they wish to become pregnant or think they might be pregnant. There is a potential for serious side effects to an unborn child. Talk to your health care professional or pharmacist for more information.

## 2023-05-06 ENCOUNTER — HEALTH MAINTENANCE LETTER (OUTPATIENT)
Age: 63
End: 2023-05-06

## 2023-05-25 DIAGNOSIS — Z94.2 S/P LUNG TRANSPLANT (H): ICD-10-CM

## 2023-05-25 RX ORDER — PREDNISONE 5 MG/1
5 TABLET ORAL EVERY MORNING
Qty: 30 TABLET | Refills: 11 | Status: ON HOLD | OUTPATIENT
Start: 2023-05-25 | End: 2023-01-01

## 2023-05-26 ENCOUNTER — TELEPHONE (OUTPATIENT)
Dept: TRANSPLANT | Facility: CLINIC | Age: 63
End: 2023-05-26
Payer: MEDICARE

## 2023-05-26 ENCOUNTER — LAB (OUTPATIENT)
Dept: LAB | Facility: CLINIC | Age: 63
End: 2023-05-26

## 2023-05-26 DIAGNOSIS — Z94.2 S/P LUNG TRANSPLANT (H): ICD-10-CM

## 2023-05-26 PROCEDURE — 80197 ASSAY OF TACROLIMUS: CPT | Performed by: PHYSICIAN ASSISTANT

## 2023-05-26 NOTE — TELEPHONE ENCOUNTER
DATE:  5/26/2023     TIME OF RECEIPT FROM LAB:  922    ORDERING PROVIDER: Ame Chow PA-C     LAB TEST:  Creatinine 6.5 and Alkptase 259    LAB VALUE:  See above     RESULTS GIVEN WITH READ-BACK TO (PROVIDER):  Mikayla Latham RN     TIME LAB VALUE REPORTED TO PROVIDER:   4089    ZEINAB Hilario, LPN   Pre-Liver/TPIAT Transplant

## 2023-05-31 ENCOUNTER — TELEPHONE (OUTPATIENT)
Dept: TRANSPLANT | Facility: CLINIC | Age: 63
End: 2023-05-31
Payer: MEDICARE

## 2023-05-31 DIAGNOSIS — Z94.2 LUNG REPLACED BY TRANSPLANT (H): ICD-10-CM

## 2023-05-31 LAB
TACROLIMUS BLD-MCNC: 5.3 UG/L (ref 5–15)
TME LAST DOSE: NORMAL H
TME LAST DOSE: NORMAL H

## 2023-05-31 NOTE — TELEPHONE ENCOUNTER
Tacrolimus level 5.3 at 12 hours, on 5/26/23.  Goal 8-10.   Current dose 0.5 mg in AM, 0.5 mg in PM    Tac level was 6.6 roughly a month ago, plan was to repeat in a couple weeks as higher dose was too high for patient.    Dose changed to 0.5 mg in AM, 1 mg in PM   Recheck level in as week    Discussed with patient   [email or web address] message sent

## 2023-06-01 RX ORDER — TACROLIMUS 0.5 MG/1
0.5 CAPSULE ORAL DAILY
Qty: 30 CAPSULE | Refills: 11 | Status: SHIPPED | OUTPATIENT
Start: 2023-06-01 | End: 2023-06-23

## 2023-06-01 RX ORDER — TACROLIMUS 1 MG/1
1 CAPSULE ORAL DAILY
Qty: 30 CAPSULE | Refills: 11 | Status: SHIPPED | OUTPATIENT
Start: 2023-06-01 | End: 2023-06-23

## 2023-06-09 ENCOUNTER — LAB (OUTPATIENT)
Dept: LAB | Facility: CLINIC | Age: 63
End: 2023-06-09

## 2023-06-09 DIAGNOSIS — Z94.2 LUNG REPLACED BY TRANSPLANT (H): ICD-10-CM

## 2023-06-09 PROCEDURE — 80197 ASSAY OF TACROLIMUS: CPT | Performed by: PHYSICIAN ASSISTANT

## 2023-06-10 LAB
TACROLIMUS BLD-MCNC: 2.7 UG/L (ref 5–15)
TME LAST DOSE: ABNORMAL H
TME LAST DOSE: ABNORMAL H

## 2023-06-15 ENCOUNTER — TELEPHONE (OUTPATIENT)
Dept: TRANSPLANT | Facility: CLINIC | Age: 63
End: 2023-06-15
Payer: MEDICARE

## 2023-06-15 DIAGNOSIS — Z94.2 STATUS POST LUNG TRANSPLANTATION (H): ICD-10-CM

## 2023-06-15 DIAGNOSIS — J98.4 DISEASE OF LUNG: ICD-10-CM

## 2023-06-15 DIAGNOSIS — I10 ESSENTIAL HYPERTENSION: ICD-10-CM

## 2023-06-15 DIAGNOSIS — Z76.82 ORGAN TRANSPLANT CANDIDATE: ICD-10-CM

## 2023-06-15 DIAGNOSIS — I25.10 CARDIOVASCULAR DISEASE: ICD-10-CM

## 2023-06-15 DIAGNOSIS — Z01.818 PRE-TRANSPLANT EVALUATION FOR KIDNEY TRANSPLANT: ICD-10-CM

## 2023-06-21 ENCOUNTER — LAB (OUTPATIENT)
Dept: LAB | Facility: CLINIC | Age: 63
End: 2023-06-21

## 2023-06-21 PROCEDURE — 80197 ASSAY OF TACROLIMUS: CPT | Performed by: PHYSICIAN ASSISTANT

## 2023-06-22 ENCOUNTER — TELEPHONE (OUTPATIENT)
Dept: TRANSPLANT | Facility: CLINIC | Age: 63
End: 2023-06-22
Payer: MEDICARE

## 2023-06-22 DIAGNOSIS — Z94.2 LUNG REPLACED BY TRANSPLANT (H): ICD-10-CM

## 2023-06-22 LAB
TACROLIMUS BLD-MCNC: 5.1 UG/L (ref 5–15)
TME LAST DOSE: NORMAL H
TME LAST DOSE: NORMAL H

## 2023-06-23 RX ORDER — TACROLIMUS 1 MG/1
1 CAPSULE ORAL 2 TIMES DAILY
Qty: 60 CAPSULE | Refills: 11 | Status: SHIPPED | OUTPATIENT
Start: 2023-06-23 | End: 2023-07-12

## 2023-06-23 NOTE — TELEPHONE ENCOUNTER
Tacrolimus level 5.1 at 12 hours, on 6/21/23.  Goal 8-10.   Current dose 0.5 mg in AM, 1 mg in PM    Dose changed to 1 mg in AM, 1 mg in PM   Recheck level in 7-10 days.     Discussed with patient.

## 2023-07-05 DIAGNOSIS — T50.995S ADVERSE EFFECT OF OTHER DRUGS, MEDICAMENTS AND BIOLOGICAL SUBSTANCES, SEQUELA: ICD-10-CM

## 2023-07-05 DIAGNOSIS — N18.5 CHRONIC KIDNEY DISEASE, STAGE V (VERY SEVERE) (H): ICD-10-CM

## 2023-07-06 ENCOUNTER — TELEPHONE (OUTPATIENT)
Dept: TRANSPLANT | Facility: CLINIC | Age: 63
End: 2023-07-06

## 2023-07-06 ENCOUNTER — TELEPHONE (OUTPATIENT)
Dept: TRANSPLANT | Facility: CLINIC | Age: 63
End: 2023-07-06
Payer: MEDICARE

## 2023-07-06 DIAGNOSIS — I10 BENIGN ESSENTIAL HYPERTENSION: ICD-10-CM

## 2023-07-06 DIAGNOSIS — Z99.2 END STAGE RENAL FAILURE ON DIALYSIS (H): ICD-10-CM

## 2023-07-06 DIAGNOSIS — N18.6 END STAGE RENAL FAILURE ON DIALYSIS (H): ICD-10-CM

## 2023-07-06 RX ORDER — METOPROLOL TARTRATE 25 MG/1
25 TABLET, FILM COATED ORAL 2 TIMES DAILY
Qty: 180 TABLET | Refills: 3 | Status: ON HOLD | OUTPATIENT
Start: 2023-07-06 | End: 2023-01-01

## 2023-07-06 RX ORDER — VIT B COMP NO.3/FOLIC/C/BIOTIN 1 MG-60 MG
1 TABLET ORAL DAILY
Qty: 30 TABLET | Refills: 11 | Status: ON HOLD | OUTPATIENT
Start: 2023-07-06 | End: 2023-01-01

## 2023-07-06 RX ORDER — METOPROLOL TARTRATE 25 MG/1
25 TABLET, FILM COATED ORAL 2 TIMES DAILY
Qty: 60 TABLET | Refills: 11 | Status: SHIPPED | OUTPATIENT
Start: 2023-07-06 | End: 2023-07-06

## 2023-07-06 NOTE — TELEPHONE ENCOUNTER
Patient Call: Medication Refill  Route to LPN  Instruct the patient to first contact their pharmacy. If they have called their pharmacy and require further assistance, route to LPN.    Pharmacy Name: [facility]  Pharmacy Location: [place]  Name of Medication: Metoprolol 25 mg- Renavite-1 mg  30 day supply   When will the patient be out of this medication?: Greater than 3 days (Route standard priority)

## 2023-07-06 NOTE — TELEPHONE ENCOUNTER
Patient calls wondering how to contact her kidney transplant coordinator.  She is wondering what is the status of her evaluation.  Writer directed her to [facility] message from 6/20/23 which she reviewed.  Colonoscopy is scheduled for later in the month.  Writer's message to Kacy asking her to contact the patient when she can.

## 2023-07-07 ENCOUNTER — LAB (OUTPATIENT)
Dept: LAB | Facility: CLINIC | Age: 63
End: 2023-07-07
Payer: MEDICARE

## 2023-07-07 DIAGNOSIS — Z94.2 LUNG REPLACED BY TRANSPLANT (H): Primary | ICD-10-CM

## 2023-07-07 PROCEDURE — 80197 ASSAY OF TACROLIMUS: CPT | Performed by: PHYSICIAN ASSISTANT

## 2023-07-09 NOTE — PROGRESS NOTES
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     1. Bilateral lung transplant:  Possible bronchitis: notes increased productive cough with intermittent wheeze for the last month. No new dyspnea. A few grandkids with fevers recently. Sating 94% on room air. DSA 2/9 and CMV 5/26 negative. CXR reviewed by me with stable basilar and perihilar opacities. PFTs down significantly today compared to her last visti/best, suspect related to infection.  - Viral and covid swab  - Sputum sample, bacterial and fungal  - Levaquin X 10 day course  - Continue AZA , increase to 50 mg daily; continue tacrolimus (goal 8-10) and prednisone, decreased to 5 mg daily for significant osteoporosis  - On Bactrim 3 times/week    2. EBV viremia: last level of 43K (log 4.6) on 4/6.   - Level pending for today    3. Right main bronchial stenosis s/p ligation: with multiple bronchs in the OR, last included debulking/dilation of the RERE. Monitor.  - If symptoms not improving, will do CT chest with possible bronch by IP    4. Left-sided aspergillus empyema: noted in 2019 s/p needle aspiration with Aspergillus fumigatus on cultures s/p intrapleural amphotericin bead placement. Posaconazole indefinitely per ID.  - Continue posaconazole    5. ESRD on iHD (since 10/2019):  HTN: continues on iHD M/W/F. BP sis elevated today at 154/89, but oftentimes lower at HD. Renal transplant listing on hold for another few months s/p knee surgeries.   - Continue metoprolol, renal MVI, phosphate  binder    6. Osteoporosis: on DEXA from 2022 with significant loss of bone in her lumbar spine and femur. Complicated by steroid use and fact that her activity is severely limited by her knee pain. Vitamin D level of 40. Following with Endocrine, supposed to get Prolia injection.   - Continue calcium/vitamin D    Chronic issues:  1. Paroxysmal AFib  2. Hypomagnesemia  3. Dermatomyositis with Raynauds  4. Congestive hepatopathy with fibrosis    RTC: 1 month  Vaccinations:  Preventative: colonoscopy scheduled for 7/25; mammogram in February negative; DEXA > May 2024; following with Alexia Chow PA-C  Pulmonary, Allergy, Critical Care and Sleep Medicine        Interval History:     Done with PT for her right knee, no longer using a walker other than at dialysis, cane for longer distances and nothing in the house. Denies shortness of breath, but notes a cough and wheezing, mucous is white, thick and clear. Started right around the time of air quality alerts,, about a month. Also with a running nose and some PND. No fever or chills, no congestion, but does have some wheezing which is similar to prior. No tightness or shortness of breath. No chest pain or palpitations. No bloating or gas, stools are normal for Kecia.           Review of Systems:   Please see HPI, otherwise the complete 10 point ROS is negative.           Past Medical and Surgical History:     Past Medical History:   Diagnosis Date     Acute on chronic respiratory failure with hypoxia (H) 02/21/2018     Anisocoria      Antisynthetase syndrome (H) 2014     Anxiety      Aspergillus (H)      Aspergillus pneumonia (H) 11/20/2020     Benign essential hypertension      C. difficile colitis      Cytomegalovirus (CMV) viremia (H)      Dermatomyositis (H)      Dysplasia of cervix, low grade (ESTRADA 1)      EBV (Franklin-Barr virus) viremia      ESRD (end stage renal disease) on dialysis (H)      ILD (interstitial lung disease) (H)      Lung replaced by  transplant (H)      Osteopenia      Paroxysmal atrial fibrillation (H)      Pneumocystis jiroveci pneumonia (H)      PONV (postoperative nausea and vomiting)      Post-menopause      Pulmonary hypertension (H)      Raynaud's disease      Seronegative rheumatoid arthritis (H)      Past Surgical History:   Procedure Laterality Date     BRONCHOSCOPY (RIGID OR FLEXIBLE), DIAGNOSTIC N/A 4/10/2018    Procedure: COMBINED BRONCHOSCOPY (RIGID OR FLEXIBLE), LAVAGE;;  Surgeon: Mariposa Donohue MD;  Location: [facility]     BRONCHOSCOPY (RIGID OR FLEXIBLE), DIAGNOSTIC N/A 12/23/2020    Procedure: BRONCHOSCOPY, WITH BRONCHOALVEOLAR LAVAGE;  Surgeon: Uri Bass MD;  [facility]: [facility]     BRONCHOSCOPY (RIGID OR FLEXIBLE), DIAGNOSTIC N/A 5/26/2022    Procedure: BRONCHOSCOPY, WITH BRONCHOALVEOLAR LAVAGE;  Surgeon: Uri Bass MD;  Location: [facility]     BRONCHOSCOPY (RIGID OR FLEXIBLE), DILATE BRONCHUS / TRACHEA N/A 10/11/2018    Procedure: BRONCHOSCOPY (RIGID OR FLEXIBLE), DILATE BRONCHUS / TRACHEA;  Flexible And Rigid Bronchoscopy and Dilation;  Surgeon: Wilber Lin MD;  [facility]     BRONCHOSCOPY FLEXIBLE N/A 3/13/2018    Procedure: BRONCHOSCOPY FLEXIBLE;  Flexible Bronchoscopy ;  Surgeon: Gissell Sanchez MD;  [facility]     BRONCHOSCOPY FLEXIBLE N/A 5/9/2018    Procedure: BRONCHOSCOPY FLEXIBLE;;  Surgeon: Wilber Lin MD;  [facility]     BRONCHOSCOPY FLEXIBLE AND RIGID N/A 9/10/2018    Procedure: BRONCHOSCOPY FLEXIBLE AND RIGID;  Flexible and Rigid Bronchoscopy with Balloon Dilation, tissue debulking with cryo, and Right mainstem bronchus stent placement;  Surgeon: Wilber Lin MD;  [facility]     BRONCHOSCOPY RIGID N/A 6/6/2018    Procedure: BRONCHOSCOPY RIGID;;  Surgeon: Lopez Macias MD;  [facility]: [facility]     BRONCHOSCOPY, DILATE BRONCHUS, STENT BRONCHUS, COMBINED N/A 6/11/2018    Procedure: COMBINED BRONCHOSCOPY, DILATE BRONCHUS, STENT BRONCHUS;   Flexible Bronchoscopy, Balloon Dilation, Bronchial Washings;  Surgeon: Wilber Lin MD;  [facility]     BRONCHOSCOPY, DILATE BRONCHUS, STENT BRONCHUS, COMBINED Right 7/10/2018    Procedure: COMBINED BRONCHOSCOPY, DILATE BRONCHUS, STENT BRONCHUS;  Flexible Bronchoscopy, Balloon Dilation, Bronchial Washings  ;  Surgeon: Wilber iLn MD;  [facility]     BRONCHOSCOPY, DILATE BRONCHUS, STENT BRONCHUS, COMBINED N/A 8/2/2018    Procedure: COMBINED BRONCHOSCOPY, DILATE BRONCHUS, STENT BRONCHUS;  Flexible Bronchoscopy, Bronchial Washings, Balloon Dilation;  Surgeon: Wilber Lin MD;  [facility]     BRONCHOSCOPY, DILATE BRONCHUS, STENT BRONCHUS, COMBINED N/A 8/20/2018    Procedure: COMBINED BRONCHOSCOPY, DILATE BRONCHUS, STENT BRONCHUS;  Flexible Bronchoscopy, Balloon Dilation;  Surgeon: Wilber Lin MD;  [facility]     BRONCHOSCOPY, DILATE BRONCHUS, STENT BRONCHUS, COMBINED N/A 10/29/2018    Procedure: Flexible Bronchoscopy, Balloon Dilation, Stent Revision, Airway Examination And Therapeutic Suctioning, Cyro Tumor Debulking;  Surgeon: Wilber Lin MD;  [facility]     BRONCHOSCOPY, DILATE BRONCHUS, STENT BRONCHUS, COMBINED N/A 11/20/2018    Procedure: Rigid Bronchoscopy, Stent Removal and dilitation;  Surgeon: Wilber Lin MD;  [facility]     BRONCHOSCOPY, DILATE BRONCHUS, STENT BRONCHUS, COMBINED N/A 12/14/2018    Procedure: Flexible And Rigid Bronchoscopy, Balloon Dilation, Bronchial Washing;  Surgeon: Wilber Lin MD;  [facility]     BRONCHOSCOPY, DILATE BRONCHUS, STENT BRONCHUS, COMBINED N/A 1/17/2019    Procedure: Flexible And Rigid Bronchoscopy, Balloon Dilation.;  Surgeon: Wilber Lin MD;  [facility]     BRONCHOSCOPY, DILATE BRONCHUS, STENT BRONCHUS, COMBINED N/A 3/7/2019    Procedure: Flexible and Rigid Bronchoscopy, Bronchial Washing, Balloon Dilation;  Surgeon: Wilber Lin MD;   [facility]     BRONCHOSCOPY, DILATE BRONCHUS, STENT BRONCHUS, COMBINED N/A 6/6/2019    Procedure: Rigid and Flexible Bronchoscopy, Balloon Dilation;  Surgeon: Wilber Lin MD;  [facility]     BRONCHOSCOPY, DILATE BRONCHUS, STENT BRONCHUS, COMBINED N/A 10/11/2019    Procedure: Flexible and Rigid Bronchoscopy, Balloon Dilation, Bronchoalveolar Lagave;  Surgeon: Wilber Lin MD;  [facility]     BRONCHOSCOPY, DILATE BRONCHUS, STENT BRONCHUS, COMBINED N/A 2/19/2021    Procedure: BRONCHOSCOPY, flexible, airway dilation, and bronchial wash;  Surgeon: Wilber Lin MD;  [facility]     BRONCHOSCOPY, DILATE BRONCHUS, STENT BRONCHUS, COMBINED N/A 4/9/2021    Procedure: BRONCHOSCOPY, flexible and rigid, Airway suctioning;  Surgeon: Mati Norris MD;  [facility]     CV RIGHT HEART CATH MEASUREMENTS RECORDED N/A 3/10/2020    Procedure: CV RIGHT HEART CATH;  Surgeon: Wai Garcia MD;  [facility]     ENT SURGERY      tonsillectomy as a child     ESOPHAGOSCOPY, GASTROSCOPY, DUODENOSCOPY (EGD), COMBINED N/A 10/29/2018    Procedure: COMBINED ESOPHAGOSCOPY, GASTROSCOPY, DUODENOSCOPY (EGD) with biopsies and polypectomy;  Surgeon: Chente Bloom MD;  [facility]     INSERT EXTRACORPORAL MEMBRANE OXYGENATOR ADULT (OUTSIDE OR) N/A 2/27/2018    Procedure: INSERT EXTRACORPORAL MEMBRANE OXYGENATOR ADULT (OUTSIDE OR);  INSERT EXTRACORPORAL MEMBRANE OXYGENATOR ADULT (OUTSIDE OR) ;  Surgeon: Toby Hernandez MD;  [facility]     IR CVC TUNNEL PLACEMENT > 5 YRS OF AGE  10/25/2019     IR DIALYSIS FISTULOGRAM LEFT  3/2/2021     IR DIALYSIS MECH THROMB, PTA  3/2/2021     IR FLUORO 0-1 HOUR  5/7/2021     IR GASTRO JEJUNOSTOMY TUBE PLACEMENT  2/16/2021     IR PARACENTESIS  1/8/2020     IR THORACENTESIS  9/13/2019     IR TRANSCATHETER BIOPSY  1/8/2020     LASER CO2 BRONCHOSCOPY N/A 4/30/2021    Procedure: Flexible and Rigid Bronchoscopy  and Tissue Debulking with CO2 Laser Assistance;  Surgeon: Mati Norris MD;  [facility]     LASER CO2 BRONCHOSCOPY N/A 6/11/2021    Procedure: BRONCHOSCOPY, Flexible and Rigid Bronchoscopy, Tissue Debulking with cryo Assistance, airway dilation,;  Surgeon: Mati Norris MD;  [facility]     LASER CO2 BRONCHOSCOPY N/A 9/16/2021    Procedure: BRONCHOSCOPY, flexible and rigid, CO2 Laser Debulking;  Surgeon: Mati Norris MD;  [facility]     LASER CO2 BRONCHOSCOPY N/A 2/11/2022    Procedure: flexible, rigid bronchoscopy, tissue debulking, airway dilation, co2 laser, bronchoalveolar lavage;  Surgeon: Juana Baugh MD;  [facility]     no prior surgery       REMOVE EXTRACORPORAL MEMBRANE OXYGENATOR ADULT N/A 3/3/2018    Procedure: REMOVE EXTRACORPORAL MEMBRANE OXYGENATOR ADULT;  Removal of Right Femoral Venous and Right Axillary Arterial Extracorporeal Membrane Oxygenator;  Surgeon: Toby Hernandez MD;  [facility]     TRANSPLANT LUNG RECIPIENT SINGLE X2 Bilateral 3/1/2018    Procedure: TRANSPLANT LUNG RECIPIENT SINGLE X2;  Median Sternotomy, Extracorporeal Membrane Oxygenator, bilateral sequential lung transplant;  Surgeon: Toby Hernandez MD;  [facility]           Family History:     Family History   Problem Relation Age of Onset     Hypertension Mother      Arthritis Mother      Pancreatic Cancer Father      Diabetes Father             Social History:     Social History     Socioeconomic History     Marital status:      Spouse name: Not on file     Number of children: Not on file     Years of education: Not on file     Highest education level: Not on file   Occupational History     Not on file   Tobacco Use     Smoking status: Never     Smokeless tobacco: Never   Substance and Sexual Activity     Alcohol use: No     Alcohol/week: 1.0 standard drink of alcohol     Types: 1 Glasses of wine per week     Drug use: No     Sexual activity: Not on file   Other  Topics Concern     Parent/sibling w/ CABG, MI or angioplasty before 65F 55M? No   Social History Narrative    3/6/2019 - Lives with . Has three daughters. Four grandchildren (two ). No pets. Travelled previously to [facility]. Has visited [place] several times.      Social Determinants of Health     Financial Resource Strain: Not on file   Food Insecurity: Not on file   Transportation Needs: Not on file   Physical Activity: Not on file   Stress: Not on file   Social Connections: Not on file   Intimate Partner Violence: Not on file   Housing Stability: Not on file            Medications:     Current Outpatient Medications   Medication     azaTHIOprine (IMURAN) 50 MG tablet     [START ON 2023] levofloxacin (LEVAQUIN) 250 MG tablet     levofloxacin (LEVAQUIN) 750 MG tablet     acetaminophen (TYLENOL) 325 MG tablet     albuterol (PROVENTIL) (2.5 MG/3ML) 0.083% neb solution     budesonide (PULMICORT) 0.5 MG/2ML neb solution     calcium acetate (PHOSLO) 667 MG CAPS capsule     Magnesium Cl-Calcium Carbonate (SLOW-MAG) 71.5-119 MG TBEC     metoprolol tartrate (LOPRESSOR) 25 MG tablet     multivitamin RENAL (RENAVITE RX/NEPHROVITE) 1 tablet tablet     pantoprazole (PROTONIX) 40 MG EC tablet     posaconazole (NOXAFIL) 100 MG EC tablet     predniSONE (DELTASONE) 5 MG tablet     sulfamethoxazole-trimethoprim (BACTRIM) 400-80 MG tablet     tacrolimus (GENERIC EQUIVALENT) 1 MG capsule     No current facility-administered medications for this visit.            Physical Exam:   BP (!) 154/89   Pulse 96   LMP 2014 (Exact Date)   SpO2 94%     GENERAL: alert, NAD  HEENT: NCAT, EOMI, no scleral icterus, oral mucosa moist and without lesions  Neck: no cervical or supraclavicular adenopathy  Lungs: moderate airflow, scattered crackles, increased in right middle lobe with scattered expiratory wheezing, increased in left base  CV: irregular rhythm, S1S2, no murmurs noted  Abdomen: normoactive BS,  soft  Lymph: no edema  Neuro: AAO X 3, CN 2-12 grossly intact  Psychiatric: normal affect, good eye contact  Skin: no rash, jaundice or lesions on limited exam         Data:   All laboratory and imaging data reviewed.      Recent Results (from the past 168 hour(s))   Tacrolimus by [employer] Mass Spectrometry    Collection Time: 07/07/23  8:25 AM   Result Value Ref Range    Tacrolimus by [employer] Mass Spectrometry 13.0 5.0 - 15.0 ug/L    Tacrolimus Last Dose Date 7/6/2023     Tacrolimus Last Dose Time  9:15 PM    Basic metabolic panel    Collection Time: 07/11/23  9:52 AM   Result Value Ref Range    Sodium 137 136 - 145 mmol/L    Potassium 5.0 3.4 - 5.3 mmol/L    Chloride 98 98 - 107 mmol/L    Carbon Dioxide (CO2) 26 22 - 29 mmol/L    Anion Gap 13 7 - 15 mmol/L    Urea Nitrogen 46.2 (H) 8.0 - 23.0 mg/dL    Creatinine 6.42 (H) 0.51 - 0.95 mg/dL    Calcium 10.0 8.8 - 10.2 mg/dL    Glucose 125 (H) 70 - 99 mg/dL    GFR Estimate 7 (L) >60 mL/min/1.73m2   Magnesium    Collection Time: 07/11/23  9:52 AM   Result Value Ref Range    Magnesium 2.0 1.7 - 2.3 mg/dL   CBC with platelets    Collection Time: 07/11/23  9:52 AM   Result Value Ref Range    WBC Count 5.8 4.0 - 11.0 10e3/uL    RBC Count 3.13 (L) 3.80 - 5.20 10e6/uL    Hemoglobin 9.9 (L) 11.7 - 15.7 g/dL    Hematocrit 31.6 (L) 35.0 - 47.0 %     (H) 78 - 100 fL    MCH 31.6 26.5 - 33.0 pg    MCHC 31.3 (L) 31.5 - 36.5 g/dL    RDW 15.0 10.0 - 15.0 %    Platelet Count 212 150 - 450 10e3/uL   Albumin level    Collection Time: 07/11/23  9:52 AM   Result Value Ref Range    Albumin 4.0 3.5 - 5.2 g/dL   Calcium    Collection Time: 07/11/23  9:52 AM   Result Value Ref Range    Calcium 10.0 8.8 - 10.2 mg/dL   TSH with free T4 reflex    Collection Time: 07/11/23  9:52 AM   Result Value Ref Range    TSH 1.23 0.30 - 4.20 uIU/mL   General PFT Lab (Please always keep checked)    Collection Time: 07/11/23 10:14 AM   Result Value Ref Range    FVC-Pred 2.91 L    FVC-Pre 1.47 L     FVC-%Pred-Pre 50 %    FEV1-Pre 1.16 L    FEV1-%Pred-Pre 49 %    FEV1FVC-Pred 80 %    FEV1FVC-Pre 79 %    FEFMax-Pred 6.29 L/sec    FEFMax-Pre 3.38 L/sec    FEFMax-%Pred-Pre 53 %    FEF2575-Pred 2.16 L/sec    FEF2575-Pre 0.93 L/sec    IRH0865-%Pred-Pre 43 %    ExpTime-Pre 5.34 sec    FIFMax-Pre 2.61 L/sec    FEV1FEV6-Pred 81 %    FEV1FEV6-Pre 77 %     PFT interpretation:  Maneuver: valid and met ATS guidelines  Moderate obstruction based on Z score  Compared to prior: FEV1 of 1.16 is 250 ml below prior  Decrease in FVC may be related to obstruction vs restriction; lung volumes would be necessary to determine

## 2023-07-10 LAB
TACROLIMUS BLD-MCNC: 13 UG/L (ref 5–15)
TME LAST DOSE: NORMAL H
TME LAST DOSE: NORMAL H

## 2023-07-10 NOTE — TELEPHONE ENCOUNTER
Pt said she is ready to get on the transplant list   
Spoke with pt about getting scheduled for official cardiac clearance. Placed order for cardiology visit. Pt will follow up after visit is scheduled/completed so she can be brought to committee for active status on the kidney transplant wait list.  
68

## 2023-07-11 ENCOUNTER — LAB (OUTPATIENT)
Dept: LAB | Facility: CLINIC | Age: 63
End: 2023-07-11
Attending: PHYSICIAN ASSISTANT
Payer: MEDICARE

## 2023-07-11 ENCOUNTER — ANCILLARY PROCEDURE (OUTPATIENT)
Dept: GENERAL RADIOLOGY | Facility: CLINIC | Age: 63
End: 2023-07-11
Attending: PHYSICIAN ASSISTANT
Payer: MEDICARE

## 2023-07-11 ENCOUNTER — OFFICE VISIT (OUTPATIENT)
Dept: PULMONOLOGY | Facility: CLINIC | Age: 63
End: 2023-07-11
Attending: PHYSICIAN ASSISTANT
Payer: MEDICARE

## 2023-07-11 VITALS — HEART RATE: 96 BPM | DIASTOLIC BLOOD PRESSURE: 89 MMHG | SYSTOLIC BLOOD PRESSURE: 154 MMHG | OXYGEN SATURATION: 94 %

## 2023-07-11 DIAGNOSIS — Z94.2 LUNG REPLACED BY TRANSPLANT (H): ICD-10-CM

## 2023-07-11 DIAGNOSIS — Z94.2 LUNG TRANSPLANT STATUS, BILATERAL (H): ICD-10-CM

## 2023-07-11 DIAGNOSIS — Z94.2 LUNG REPLACED BY TRANSPLANT (H): Primary | ICD-10-CM

## 2023-07-11 DIAGNOSIS — M81.0 AGE-RELATED OSTEOPOROSIS WITHOUT CURRENT PATHOLOGICAL FRACTURE: ICD-10-CM

## 2023-07-11 DIAGNOSIS — Z79.52 CURRENT CHRONIC USE OF SYSTEMIC STEROIDS: ICD-10-CM

## 2023-07-11 DIAGNOSIS — Z99.2 ESRD (END STAGE RENAL DISEASE) ON DIALYSIS (H): ICD-10-CM

## 2023-07-11 DIAGNOSIS — N18.6 ESRD (END STAGE RENAL DISEASE) ON DIALYSIS (H): ICD-10-CM

## 2023-07-11 LAB
ALBUMIN SERPL BCG-MCNC: 3.9 G/DL (ref 3.5–5.2)
ALBUMIN SERPL BCG-MCNC: 4 G/DL (ref 3.5–5.2)
ALP SERPL-CCNC: 344 U/L (ref 35–104)
ALT SERPL W P-5'-P-CCNC: 55 U/L (ref 0–50)
ANION GAP SERPL CALCULATED.3IONS-SCNC: 13 MMOL/L (ref 7–15)
AST SERPL W P-5'-P-CCNC: 33 U/L (ref 0–45)
BILIRUB DIRECT SERPL-MCNC: 0.22 MG/DL (ref 0–0.3)
BILIRUB SERPL-MCNC: 0.5 MG/DL
BUN SERPL-MCNC: 46.2 MG/DL (ref 8–23)
C PNEUM DNA SPEC QL NAA+PROBE: NOT DETECTED
CALCIUM SERPL-MCNC: 10 MG/DL (ref 8.8–10.2)
CALCIUM SERPL-MCNC: 10 MG/DL (ref 8.8–10.2)
CHLORIDE SERPL-SCNC: 98 MMOL/L (ref 98–107)
CREAT SERPL-MCNC: 6.42 MG/DL (ref 0.51–0.95)
DEPRECATED HCO3 PLAS-SCNC: 26 MMOL/L (ref 22–29)
ERYTHROCYTE [DISTWIDTH] IN BLOOD BY AUTOMATED COUNT: 15 % (ref 10–15)
EXPTIME-PRE: 5.34 SEC
FEF2575-%PRED-PRE: 43 %
FEF2575-PRE: 0.93 L/SEC
FEF2575-PRED: 2.16 L/SEC
FEFMAX-%PRED-PRE: 53 %
FEFMAX-PRE: 3.38 L/SEC
FEFMAX-PRED: 6.29 L/SEC
FEV1-%PRED-PRE: 49 %
FEV1-PRE: 1.16 L
FEV1FEV6-PRE: 77 %
FEV1FEV6-PRED: 81 %
FEV1FVC-PRE: 79 %
FEV1FVC-PRED: 80 %
FIFMAX-PRE: 2.61 L/SEC
FLUAV H1 2009 PAND RNA SPEC QL NAA+PROBE: NOT DETECTED
FLUAV H1 RNA SPEC QL NAA+PROBE: NOT DETECTED
FLUAV H3 RNA SPEC QL NAA+PROBE: NOT DETECTED
FLUAV RNA SPEC QL NAA+PROBE: NOT DETECTED
FLUBV RNA SPEC QL NAA+PROBE: NOT DETECTED
FVC-%PRED-PRE: 50 %
FVC-PRE: 1.47 L
FVC-PRED: 2.91 L
GFR SERPL CREATININE-BSD FRML MDRD: 7 ML/MIN/1.73M2
GLUCOSE SERPL-MCNC: 125 MG/DL (ref 70–99)
HADV DNA SPEC QL NAA+PROBE: NOT DETECTED
HCOV PNL SPEC NAA+PROBE: NOT DETECTED
HCT VFR BLD AUTO: 31.6 % (ref 35–47)
HGB BLD-MCNC: 9.9 G/DL (ref 11.7–15.7)
HMPV RNA SPEC QL NAA+PROBE: NOT DETECTED
HPIV1 RNA SPEC QL NAA+PROBE: NOT DETECTED
HPIV2 RNA SPEC QL NAA+PROBE: NOT DETECTED
HPIV3 RNA SPEC QL NAA+PROBE: NOT DETECTED
HPIV4 RNA SPEC QL NAA+PROBE: NOT DETECTED
M PNEUMO DNA SPEC QL NAA+PROBE: NOT DETECTED
MAGNESIUM SERPL-MCNC: 2 MG/DL (ref 1.7–2.3)
MCH RBC QN AUTO: 31.6 PG (ref 26.5–33)
MCHC RBC AUTO-ENTMCNC: 31.3 G/DL (ref 31.5–36.5)
MCV RBC AUTO: 101 FL (ref 78–100)
PLATELET # BLD AUTO: 212 10E3/UL (ref 150–450)
POTASSIUM SERPL-SCNC: 5 MMOL/L (ref 3.4–5.3)
PROT SERPL-MCNC: 7.9 G/DL (ref 6.4–8.3)
RBC # BLD AUTO: 3.13 10E6/UL (ref 3.8–5.2)
RSV RNA SPEC QL NAA+PROBE: NOT DETECTED
RSV RNA SPEC QL NAA+PROBE: NOT DETECTED
RV+EV RNA SPEC QL NAA+PROBE: NOT DETECTED
SARS-COV-2 RNA RESP QL NAA+PROBE: NEGATIVE
SODIUM SERPL-SCNC: 137 MMOL/L (ref 136–145)
TACROLIMUS BLD-MCNC: 11.4 UG/L (ref 5–15)
TME LAST DOSE: NORMAL H
TME LAST DOSE: NORMAL H
TSH SERPL DL<=0.005 MIU/L-ACNC: 1.23 UIU/ML (ref 0.3–4.2)
WBC # BLD AUTO: 5.8 10E3/UL (ref 4–11)

## 2023-07-11 PROCEDURE — 83735 ASSAY OF MAGNESIUM: CPT | Performed by: PATHOLOGY

## 2023-07-11 PROCEDURE — 87799 DETECT AGENT NOS DNA QUANT: CPT | Performed by: PHYSICIAN ASSISTANT

## 2023-07-11 PROCEDURE — 82306 VITAMIN D 25 HYDROXY: CPT | Performed by: INTERNAL MEDICINE

## 2023-07-11 PROCEDURE — 99000 SPECIMEN HANDLING OFFICE-LAB: CPT | Performed by: PATHOLOGY

## 2023-07-11 PROCEDURE — 87635 SARS-COV-2 COVID-19 AMP PRB: CPT | Performed by: PHYSICIAN ASSISTANT

## 2023-07-11 PROCEDURE — 80197 ASSAY OF TACROLIMUS: CPT | Performed by: PHYSICIAN ASSISTANT

## 2023-07-11 PROCEDURE — 87486 CHLMYD PNEUM DNA AMP PROBE: CPT | Performed by: PHYSICIAN ASSISTANT

## 2023-07-11 PROCEDURE — 85027 COMPLETE CBC AUTOMATED: CPT | Performed by: PATHOLOGY

## 2023-07-11 PROCEDURE — 84443 ASSAY THYROID STIM HORMONE: CPT | Mod: GA | Performed by: PATHOLOGY

## 2023-07-11 PROCEDURE — 86364 TISS TRNSGLTMNASE EA IG CLAS: CPT | Mod: 59 | Performed by: INTERNAL MEDICINE

## 2023-07-11 PROCEDURE — G0463 HOSPITAL OUTPT CLINIC VISIT: HCPCS | Performed by: PHYSICIAN ASSISTANT

## 2023-07-11 PROCEDURE — 82248 BILIRUBIN DIRECT: CPT | Performed by: PATHOLOGY

## 2023-07-11 PROCEDURE — 36415 COLL VENOUS BLD VENIPUNCTURE: CPT | Performed by: PATHOLOGY

## 2023-07-11 PROCEDURE — 94375 RESPIRATORY FLOW VOLUME LOOP: CPT | Performed by: PHYSICIAN ASSISTANT

## 2023-07-11 PROCEDURE — 80053 COMPREHEN METABOLIC PANEL: CPT | Performed by: PATHOLOGY

## 2023-07-11 PROCEDURE — 71046 X-RAY EXAM CHEST 2 VIEWS: CPT | Mod: GC | Performed by: RADIOLOGY

## 2023-07-11 PROCEDURE — 87070 CULTURE OTHR SPECIMN AEROBIC: CPT | Performed by: PHYSICIAN ASSISTANT

## 2023-07-11 PROCEDURE — 99214 OFFICE O/P EST MOD 30 MIN: CPT | Mod: 25 | Performed by: PHYSICIAN ASSISTANT

## 2023-07-11 RX ORDER — AZATHIOPRINE 50 MG/1
50 TABLET ORAL DAILY
Qty: 45 TABLET | Refills: 3
Start: 2023-07-11 | End: 2023-07-20

## 2023-07-11 RX ORDER — LEVOFLOXACIN 750 MG/1
750 TABLET, FILM COATED ORAL ONCE
Qty: 1 TABLET | Refills: 0 | Status: SHIPPED | OUTPATIENT
Start: 2023-07-11 | End: 2023-07-11

## 2023-07-11 RX ORDER — LEVOFLOXACIN 250 MG/1
250 TABLET, FILM COATED ORAL DAILY
Qty: 9 TABLET | Refills: 0 | Status: SHIPPED | OUTPATIENT
Start: 2023-07-12 | End: 2023-07-21

## 2023-07-11 ASSESSMENT — PAIN SCALES - GENERAL: PAINLEVEL: NO PAIN (0)

## 2023-07-11 NOTE — NURSING NOTE
Chief Complaint   Patient presents with    Lung Transplant     3 month follow up     Vitals were taken and medications were reconciled.     Cecille Reed RMA  10:58 AM

## 2023-07-11 NOTE — PATIENT INSTRUCTIONS
PATIENT INSTRUCTIONS:    1. Please drop off sputum sample at [facility].  2. Start levaquin today. One 750 mg tablet today, then one 250 mg tablet daily for the next 9 days (starting tomorrow).   3. Increase azathioprine to 50 mg daily  4. Continue to hydrate with 60-70 oz fluids daily.  5. Continue to exercise daily or most days of the week.  6. Follow up with your primary care provider for annual gender health maintenance procedures.  Thoracic Transplant Office phone [phone], fax [phone]  Office Hours 8:30 - 5:00     For after-hours urgent issues, please dial [phone], option [phone] and ask to speak with the Thoracic Transplant Coordinator  On-Call, pager [phone].  --------------------  To expedite your medication refill(s), please contact your pharmacy and have them fax a refill request to: [phone]  .   *Please allow 3 business days for routine medication refills.  *Please allow 5 business days for controlled substance medication refills.    **For Diabetic medications and supplies refill(s), please contact your pharmacy and have them  Contact your Endocrine team.  --------------------  For scheduling appointments call [facility] :  [phone]. For lab appointments call [phone] or [facility].  --------------------  Please Note: If you are active on [employer], all future test results will be sent by [employer] message only, and will no longer be called to patient. You may also receive communication directly from your physician.

## 2023-07-11 NOTE — LETTER
PHYSICIAN ORDERS      DATE & TIME ISSUED: 2023 11:35 AM  PATIENT NAME: Kecia Blue   : 1960     [facility] MR# [if applicable]: [id number]     DIAGNOSIS:  Lung Transplant  Z94.2    Sputum culture aerobic bacterial with gram stain  Fungal culture non blood - sputum      Any questions please call:     Please fax these results to [phone].        Ame Chow PA-C

## 2023-07-11 NOTE — LETTER
7/11/2023         RE: Kecia Blue  [address] [place] [address]        Dear Colleague,    Thank you for referring your patient, Kecia Blue, to the [facility]. Please see a copy of my visit note below.    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     1. Bilateral lung transplant:  Possible bronchitis: notes increased productive cough with intermittent wheeze for the last month. No new dyspnea. A few grandkids with fevers recently. Sating 94% on room air. DSA 2/9 and CMV 5/26 negative. CXR reviewed by me with stable basilar and perihilar opacities. PFTs down significantly today compared to her last visti/best, suspect related to infection.  - Viral and covid swab  - Sputum sample, bacterial and fungal  - Levaquin X 10 day course  - Continue AZA , increase to 50 mg daily; continue tacrolimus (goal 8-10) and prednisone, decreased to 5 mg daily for significant osteoporosis  - On Bactrim 3 times/week    2. EBV viremia: last level of 43K (log 4.6) on 4/6.   - Level pending for today    3. Right main bronchial stenosis s/p ligation: with multiple bronchs in the OR, last included debulking/dilation of the RERE. Monitor.  - If symptoms not improving, will do CT chest with possible bronch by IP    4. Left-sided aspergillus empyema: noted in 2019 s/p needle aspiration with Aspergillus fumigatus on cultures s/p intrapleural amphotericin bead placement.  Posaconazole indefinitely per ID.  - Continue posaconazole    5. ESRD on iHD (since 10/2019):  HTN: continues on iHD M/W/F. BP sis elevated today at 154/89, but oftentimes lower at HD. Renal transplant listing on hold for another few months s/p knee surgeries.   - Continue metoprolol, renal MVI, phosphate binder    6. Osteoporosis: on DEXA from 2022 with significant loss of bone in her lumbar spine and femur. Complicated by steroid use and fact that her activity is severely limited by her knee pain. Vitamin D level of 40. Following with Endocrine, supposed to get Prolia injection.   - Continue calcium/vitamin D    Chronic issues:  1. Paroxysmal AFib  2. Hypomagnesemia  3. Dermatomyositis with Raynauds  4. Congestive hepatopathy with fibrosis    RTC: 1 month  Vaccinations:  Preventative: colonoscopy scheduled for 7/25; mammogram in February negative; DEXA > May 2024; following with Alexia Chow PA-C  Pulmonary, Allergy, Critical Care and Sleep Medicine        Interval History:     Done with PT for her right knee, no longer using a walker other than at dialysis, cane for longer distances and nothing in the house. Denies shortness of breath, but notes a cough and wheezing, mucous is white, thick and clear. Started right around the time of air quality alerts,, about a month. Also with a running nose and some PND. No fever or chills, no congestion, but does have some wheezing which is similar to prior. No tightness or shortness of breath. No chest pain or palpitations. No bloating or gas, stools are normal for Kecia.           Review of Systems:   Please see HPI, otherwise the complete 10 point ROS is negative.           Past Medical and Surgical History:     Past Medical History:   Diagnosis Date     Acute on chronic respiratory failure with hypoxia (H) 02/21/2018     Anisocoria      Antisynthetase syndrome (H) 2014     Anxiety      Aspergillus (H)      Aspergillus pneumonia (H) 11/20/2020     Benign essential  hypertension      C. difficile colitis      Cytomegalovirus (CMV) viremia (H)      Dermatomyositis (H)      Dysplasia of cervix, low grade (ESTRADA 1)      EBV (Franklin-Barr virus) viremia      ESRD (end stage renal disease) on dialysis (H)      ILD (interstitial lung disease) (H)      Lung replaced by transplant (H)      Osteopenia      Paroxysmal atrial fibrillation (H)      Pneumocystis jiroveci pneumonia (H)      PONV (postoperative nausea and vomiting)      Post-menopause      Pulmonary hypertension (H)      Raynaud's disease      Seronegative rheumatoid arthritis (H)      Past Surgical History:   Procedure Laterality Date     BRONCHOSCOPY (RIGID OR FLEXIBLE), DIAGNOSTIC N/A 4/10/2018    Procedure: COMBINED BRONCHOSCOPY (RIGID OR FLEXIBLE), LAVAGE;;  Surgeon: Mariposa Donohue MD;  [facility]     BRONCHOSCOPY (RIGID OR FLEXIBLE), DIAGNOSTIC N/A 12/23/2020    Procedure: BRONCHOSCOPY, WITH BRONCHOALVEOLAR LAVAGE;  Surgeon: Uri Bass MD;  [facility]     BRONCHOSCOPY (RIGID OR FLEXIBLE), DIAGNOSTIC N/A 5/26/2022    Procedure: BRONCHOSCOPY, WITH BRONCHOALVEOLAR LAVAGE;  Surgeon: Uri Bass MD;  [facility]     BRONCHOSCOPY (RIGID OR FLEXIBLE), DILATE BRONCHUS / TRACHEA N/A 10/11/2018    Procedure: BRONCHOSCOPY (RIGID OR FLEXIBLE), DILATE BRONCHUS / TRACHEA;  Flexible And Rigid Bronchoscopy and Dilation;  Surgeon: Wilber Lin MD;  [facility]     BRONCHOSCOPY FLEXIBLE N/A 3/13/2018    Procedure: BRONCHOSCOPY FLEXIBLE;  Flexible Bronchoscopy ;  Surgeon: Gissell Sanchez MD;  [facility]     BRONCHOSCOPY FLEXIBLE N/A 5/9/2018    Procedure: BRONCHOSCOPY FLEXIBLE;;  Surgeon: Wilber Lin MD;  [facility]     BRONCHOSCOPY FLEXIBLE AND RIGID N/A 9/10/2018    Procedure: BRONCHOSCOPY FLEXIBLE AND RIGID;  Flexible and Rigid Bronchoscopy with Balloon Dilation, tissue debulking with cryo, and Right mainstem bronchus stent placement;  Surgeon: Wilber Lin  MD Alana;  [facility]     BRONCHOSCOPY RIGID N/A 6/6/2018    Procedure: BRONCHOSCOPY RIGID;;  Surgeon: Lopez Macias MD;  [facility]     BRONCHOSCOPY, DILATE BRONCHUS, STENT BRONCHUS, COMBINED N/A 6/11/2018    Procedure: COMBINED BRONCHOSCOPY, DILATE BRONCHUS, STENT BRONCHUS;  Flexible Bronchoscopy, Balloon Dilation, Bronchial Washings;  Surgeon: Wilber Lin MD;  [facility]     BRONCHOSCOPY, DILATE BRONCHUS, STENT BRONCHUS, COMBINED Right 7/10/2018    Procedure: COMBINED BRONCHOSCOPY, DILATE BRONCHUS, STENT BRONCHUS;  Flexible Bronchoscopy, Balloon Dilation, Bronchial Washings  ;  Surgeon: Wilber Lin MD;  [facility]     BRONCHOSCOPY, DILATE BRONCHUS, STENT BRONCHUS, COMBINED N/A 8/2/2018    Procedure: COMBINED BRONCHOSCOPY, DILATE BRONCHUS, STENT BRONCHUS;  Flexible Bronchoscopy, Bronchial Washings, Balloon Dilation;  Surgeon: Wilber Lin MD;  [facility]     BRONCHOSCOPY, DILATE BRONCHUS, STENT BRONCHUS, COMBINED N/A 8/20/2018    Procedure: COMBINED BRONCHOSCOPY, DILATE BRONCHUS, STENT BRONCHUS;  Flexible Bronchoscopy, Balloon Dilation;  Surgeon: Wilber Lin MD;  [facility]     BRONCHOSCOPY, DILATE BRONCHUS, STENT BRONCHUS, COMBINED N/A 10/29/2018    Procedure: Flexible Bronchoscopy, Balloon Dilation, Stent Revision, Airway Examination And Therapeutic Suctioning, Cyro Tumor Debulking;  Surgeon: Wilber Lin MD;  [facility]     BRONCHOSCOPY, DILATE BRONCHUS, STENT BRONCHUS, COMBINED N/A 11/20/2018    Procedure: Rigid Bronchoscopy, Stent Removal and dilitation;  Surgeon: Wilber iLn MD;  [facility]     BRONCHOSCOPY, DILATE BRONCHUS, STENT BRONCHUS, COMBINED N/A 12/14/2018    Procedure: Flexible And Rigid Bronchoscopy, Balloon Dilation, Bronchial Washing;  Surgeon: Wilber Lin MD;  [facility]     BRONCHOSCOPY, DILATE BRONCHUS, STENT BRONCHUS, COMBINED N/A 1/17/2019    Procedure: Flexible  And Rigid Bronchoscopy, Balloon Dilation.;  Surgeon: Wilber Lin MD;  [facility]     BRONCHOSCOPY, DILATE BRONCHUS, STENT BRONCHUS, COMBINED N/A 3/7/2019    Procedure: Flexible and Rigid Bronchoscopy, Bronchial Washing, Balloon Dilation;  Surgeon: Wilber Lin MD;  [facility]     BRONCHOSCOPY, DILATE BRONCHUS, STENT BRONCHUS, COMBINED N/A 6/6/2019    Procedure: Rigid and Flexible Bronchoscopy, Balloon Dilation;  Surgeon: Wilber Lin MD;  [facility]     BRONCHOSCOPY, DILATE BRONCHUS, STENT BRONCHUS, COMBINED N/A 10/11/2019    Procedure: Flexible and Rigid Bronchoscopy, Balloon Dilation, Bronchoalveolar Lagave;  Surgeon: Wilber Lin MD;  [facility]     BRONCHOSCOPY, DILATE BRONCHUS, STENT BRONCHUS, COMBINED N/A 2/19/2021    Procedure: BRONCHOSCOPY, flexible, airway dilation, and bronchial wash;  Surgeon: Wilber Lin MD;  [facility]     BRONCHOSCOPY, DILATE BRONCHUS, STENT BRONCHUS, COMBINED N/A 4/9/2021    Procedure: BRONCHOSCOPY, flexible and rigid, Airway suctioning;  Surgeon: Mati Norris MD;  [facility]     CV RIGHT HEART CATH MEASUREMENTS RECORDED N/A 3/10/2020    Procedure: CV RIGHT HEART CATH;  Surgeon: Wai Garcia MD;  [facility]     ENT SURGERY      tonsillectomy as a child     ESOPHAGOSCOPY, GASTROSCOPY, DUODENOSCOPY (EGD), COMBINED N/A 10/29/2018    Procedure: COMBINED ESOPHAGOSCOPY, GASTROSCOPY, DUODENOSCOPY (EGD) with biopsies and polypectomy;  Surgeon: Chente Bloom MD;  [facility]     INSERT EXTRACORPORAL MEMBRANE OXYGENATOR ADULT (OUTSIDE OR) N/A 2/27/2018    Procedure: INSERT EXTRACORPORAL MEMBRANE OXYGENATOR ADULT (OUTSIDE OR);  INSERT EXTRACORPORAL MEMBRANE OXYGENATOR ADULT (OUTSIDE OR) ;  Surgeon: Toby Hernandez MD;  [facility]     IR CVC TUNNEL PLACEMENT > 5 YRS OF AGE  10/25/2019     IR DIALYSIS FISTULOGRAM LEFT  3/2/2021     IR DIALYSIS [facility]  THROMB, PTA  3/2/2021     IR FLUORO 0-1 HOUR  5/7/2021     IR GASTRO JEJUNOSTOMY TUBE PLACEMENT  2/16/2021     IR PARACENTESIS  1/8/2020     IR THORACENTESIS  9/13/2019     IR TRANSCATHETER BIOPSY  1/8/2020     LASER CO2 BRONCHOSCOPY N/A 4/30/2021    Procedure: Flexible and Rigid Bronchoscopy and Tissue Debulking with CO2 Laser Assistance;  Surgeon: Mati Norris MD;  [facility]     LASER CO2 BRONCHOSCOPY N/A 6/11/2021    Procedure: BRONCHOSCOPY, Flexible and Rigid Bronchoscopy, Tissue Debulking with cryo Assistance, airway dilation,;  Surgeon: Mati Norris MD;  [facility]     LASER CO2 BRONCHOSCOPY N/A 9/16/2021    Procedure: BRONCHOSCOPY, flexible and rigid, CO2 Laser Debulking;  Surgeon: Mati Norris MD;  [facility]     LASER CO2 BRONCHOSCOPY N/A 2/11/2022    Procedure: flexible, rigid bronchoscopy, tissue debulking, airway dilation, co2 laser, bronchoalveolar lavage;  Surgeon: Juana Baugh MD;  [facility]     no prior surgery       REMOVE EXTRACORPORAL MEMBRANE OXYGENATOR ADULT N/A 3/3/2018    Procedure: REMOVE EXTRACORPORAL MEMBRANE OXYGENATOR ADULT;  Removal of Right Femoral Venous and Right Axillary Arterial Extracorporeal Membrane Oxygenator;  Surgeon: Toby Hernandez MD;  [facility]     TRANSPLANT LUNG RECIPIENT SINGLE X2 Bilateral 3/1/2018    Procedure: TRANSPLANT LUNG RECIPIENT SINGLE X2;  Median Sternotomy, Extracorporeal Membrane Oxygenator, bilateral sequential lung transplant;  Surgeon: Toby Hernandez MD;  [facility]           Family History:     Family History   Problem Relation Age of Onset     Hypertension Mother      Arthritis Mother      Pancreatic Cancer Father      Diabetes Father             Social History:     Social History     Socioeconomic History     Marital status:      Spouse name: Not on file     Number of children: Not on file     Years of education: Not on file     Highest education level: Not on file    Occupational History     Not on file   Tobacco Use     Smoking status: Never     Smokeless tobacco: Never   Substance and Sexual Activity     Alcohol use: No     Alcohol/week: 1.0 standard drink of alcohol     Types: 1 Glasses of wine per week     Drug use: No     Sexual activity: Not on file   Other Topics Concern     Parent/sibling w/ CABG, MI or angioplasty before 65F 55M? No   Social History Narrative    3/6/2019 - Lives with . Has three daughters. Four grandchildren (two ). No pets. Travelled previously to [place]. Has visited [place] several times.      Social Determinants of Health     Financial Resource Strain: Not on file   Food Insecurity: Not on file   Transportation Needs: Not on file   Physical Activity: Not on file   Stress: Not on file   Social Connections: Not on file   Intimate Partner Violence: Not on file   Housing Stability: Not on file            Medications:     Current Outpatient Medications   Medication     azaTHIOprine (IMURAN) 50 MG tablet     [START ON 2023] levofloxacin (LEVAQUIN) 250 MG tablet     levofloxacin (LEVAQUIN) 750 MG tablet     acetaminophen (TYLENOL) 325 MG tablet     albuterol (PROVENTIL) (2.5 MG/3ML) 0.083% neb solution     budesonide (PULMICORT) 0.5 MG/2ML neb solution     calcium acetate (PHOSLO) 667 MG CAPS capsule     Magnesium Cl-Calcium Carbonate (SLOW-MAG) 71.5-119 MG TBEC     metoprolol tartrate (LOPRESSOR) 25 MG tablet     multivitamin RENAL (RENAVITE RX/NEPHROVITE) 1 tablet tablet     pantoprazole (PROTONIX) 40 MG EC tablet     posaconazole (NOXAFIL) 100 MG EC tablet     predniSONE (DELTASONE) 5 MG tablet     sulfamethoxazole-trimethoprim (BACTRIM) 400-80 MG tablet     tacrolimus (GENERIC EQUIVALENT) 1 MG capsule     No current facility-administered medications for this visit.            Physical Exam:   BP (!) 154/89   Pulse 96   LMP 2014 (Exact Date)   SpO2 94%     GENERAL: alert, NAD  HEENT: NCAT, EOMI, no scleral icterus,  oral mucosa moist and without lesions  Neck: no cervical or supraclavicular adenopathy  Lungs: moderate airflow, scattered crackles, increased in right middle lobe with scattered expiratory wheezing, increased in left base  CV: irregular rhythm, S1S2, no murmurs noted  Abdomen: normoactive BS, soft  Lymph: no edema  Neuro: AAO X 3, CN 2-12 grossly intact  Psychiatric: normal affect, good eye contact  Skin: no rash, jaundice or lesions on limited exam         Data:   All laboratory and imaging data reviewed.      Recent Results (from the past 168 hour(s))   Tacrolimus by [employer] Mass Spectrometry    Collection Time: 07/07/23  8:25 AM   Result Value Ref Range    Tacrolimus by Tandem Mass Spectrometry 13.0 5.0 - 15.0 ug/L    Tacrolimus Last Dose Date 7/6/2023     Tacrolimus Last Dose Time  9:15 PM    Basic metabolic panel    Collection Time: 07/11/23  9:52 AM   Result Value Ref Range    Sodium 137 136 - 145 mmol/L    Potassium 5.0 3.4 - 5.3 mmol/L    Chloride 98 98 - 107 mmol/L    Carbon Dioxide (CO2) 26 22 - 29 mmol/L    Anion Gap 13 7 - 15 mmol/L    Urea Nitrogen 46.2 (H) 8.0 - 23.0 mg/dL    Creatinine 6.42 (H) 0.51 - 0.95 mg/dL    Calcium 10.0 8.8 - 10.2 mg/dL    Glucose 125 (H) 70 - 99 mg/dL    GFR Estimate 7 (L) >60 mL/min/1.73m2   Magnesium    Collection Time: 07/11/23  9:52 AM   Result Value Ref Range    Magnesium 2.0 1.7 - 2.3 mg/dL   CBC with platelets    Collection Time: 07/11/23  9:52 AM   Result Value Ref Range    WBC Count 5.8 4.0 - 11.0 10e3/uL    RBC Count 3.13 (L) 3.80 - 5.20 10e6/uL    Hemoglobin 9.9 (L) 11.7 - 15.7 g/dL    Hematocrit 31.6 (L) 35.0 - 47.0 %     (H) 78 - 100 fL    MCH 31.6 26.5 - 33.0 pg    MCHC 31.3 (L) 31.5 - 36.5 g/dL    RDW 15.0 10.0 - 15.0 %    Platelet Count 212 150 - 450 10e3/uL   Albumin level    Collection Time: 07/11/23  9:52 AM   Result Value Ref Range    Albumin 4.0 3.5 - 5.2 g/dL   Calcium    Collection Time: 07/11/23  9:52 AM   Result Value Ref Range    Calcium 10.0  8.8 - 10.2 mg/dL   TSH with free T4 reflex    Collection Time: 07/11/23  9:52 AM   Result Value Ref Range    TSH 1.23 0.30 - 4.20 uIU/mL   General PFT Lab (Please always keep checked)    Collection Time: 07/11/23 10:14 AM   Result Value Ref Range    FVC-Pred 2.91 L    FVC-Pre 1.47 L    FVC-%Pred-Pre 50 %    FEV1-Pre 1.16 L    FEV1-%Pred-Pre 49 %    FEV1FVC-Pred 80 %    FEV1FVC-Pre 79 %    FEFMax-Pred 6.29 L/sec    FEFMax-Pre 3.38 L/sec    FEFMax-%Pred-Pre 53 %    FEF2575-Pred 2.16 L/sec    FEF2575-Pre 0.93 L/sec    LTZ6225-%Pred-Pre 43 %    ExpTime-Pre 5.34 sec    FIFMax-Pre 2.61 L/sec    FEV1FEV6-Pred 81 %    FEV1FEV6-Pre 77 %     PFT interpretation:  Maneuver: valid and met ATS guidelines  Moderate obstruction based on Z score  Compared to prior: FEV1 of 1.16 is 250 ml below prior  Decrease in FVC may be related to obstruction vs restriction; lung volumes would be necessary to determine      Again, thank you for allowing me to participate in the care of your patient.        Sincerely,        Ame Chow PA-C

## 2023-07-12 ENCOUNTER — TELEPHONE (OUTPATIENT)
Dept: TRANSPLANT | Facility: CLINIC | Age: 63
End: 2023-07-12
Payer: MEDICARE

## 2023-07-12 DIAGNOSIS — Z94.2 LUNG REPLACED BY TRANSPLANT (H): ICD-10-CM

## 2023-07-12 DIAGNOSIS — Z94.2 LUNG REPLACED BY TRANSPLANT (H): Primary | ICD-10-CM

## 2023-07-12 LAB
CMV DNA SPEC NAA+PROBE-ACNC: NOT DETECTED IU/ML
EBV DNA COPIES/ML, INSTRUMENT: ABNORMAL COPIES/ML
EBV DNA SPEC NAA+PROBE-LOG#: 4.7 {LOG_COPIES}/ML

## 2023-07-12 RX ORDER — TACROLIMUS 0.5 MG/1
0.5 CAPSULE ORAL EVERY EVENING
Qty: 30 CAPSULE | Refills: 11 | Status: SHIPPED | OUTPATIENT
Start: 2023-07-12 | End: 2023-01-01

## 2023-07-12 RX ORDER — TACROLIMUS 1 MG/1
1 CAPSULE ORAL EVERY MORNING
Qty: 30 CAPSULE | Refills: 11 | Status: SHIPPED | OUTPATIENT
Start: 2023-07-12 | End: 2023-01-01

## 2023-07-12 NOTE — TELEPHONE ENCOUNTER
Tacrolimus level 11.4 at 12 hours, on 7/11/23.  Goal 8-10.   Current dose 1 mg in AM, 1 mg in PM    Dose changed to 1 mg in AM, 0.5 mg in PM   Recheck level in 7-10 days.    Discussed with pt.

## 2023-07-14 LAB
TTG IGA SER-ACNC: 0.6 U/ML
TTG IGG SER-ACNC: 2.4 U/ML

## 2023-07-16 LAB
DEPRECATED CALCIDIOL+CALCIFEROL SERPL-MC: <80 UG/L (ref 20–75)
VITAMIN D2 SERPL-MCNC: <5 UG/L
VITAMIN D3 SERPL-MCNC: 75 UG/L

## 2023-07-17 NOTE — RESULT ENCOUNTER NOTE
Hello -    Your vitamin d level is higher than the desired range. I recommend reducing your vitamin d supplement by 40% and rechecking vitamin d level in  in 2-3 months.     Please let us know if you have any questions or concerns.     Regards,  Lewis Patel MD

## 2023-07-18 ENCOUNTER — TELEPHONE (OUTPATIENT)
Dept: TRANSPLANT | Facility: CLINIC | Age: 63
End: 2023-07-18

## 2023-07-18 NOTE — TELEPHONE ENCOUNTER
Patient Call: Medication Refill  Route to LPN  Instruct the patient to first contact their pharmacy. If they have called their pharmacy and require further assistance, route to LPN.    Pharmacy Name: [facility]  Pharmacy Location: [address] [place] [address]  Name of Medication: azathioprineDose: 50 mg  When will the patient be out of this medication?: Less than 3 days (Route high priority)

## 2023-07-20 DIAGNOSIS — Z94.2 LUNG TRANSPLANT STATUS, BILATERAL (H): ICD-10-CM

## 2023-07-20 RX ORDER — AZATHIOPRINE 50 MG/1
50 TABLET ORAL DAILY
Qty: 90 TABLET | Refills: 3 | Status: SHIPPED | OUTPATIENT
Start: 2023-07-20 | End: 2023-01-01

## 2023-07-28 ENCOUNTER — TELEPHONE (OUTPATIENT)
Dept: TRANSPLANT | Facility: CLINIC | Age: 63
End: 2023-07-28
Payer: MEDICARE

## 2023-07-31 NOTE — TELEPHONE ENCOUNTER
Dr. Barnhart from local dentist calls regarding patient, she is needing a tooth extracted. Reports the tooth is infected, but thinks once the tooth is removed, source of infection, this will fix the problem. She will receive Amoxicillin prior to dental work as she recently had knee replacements. This okay per Ame Chow and pre kidney team, Dr. Barnhart notified of this on 7/31/23.

## 2023-08-02 ENCOUNTER — TELEPHONE (OUTPATIENT)
Dept: ONCOLOGY | Facility: CLINIC | Age: 63
End: 2023-08-02
Payer: MEDICARE

## 2023-08-02 ENCOUNTER — TELEPHONE (OUTPATIENT)
Dept: TRANSPLANT | Facility: CLINIC | Age: 63
End: 2023-08-02
Payer: MEDICARE

## 2023-08-02 ENCOUNTER — COMMITTEE REVIEW (OUTPATIENT)
Dept: TRANSPLANT | Facility: CLINIC | Age: 63
End: 2023-08-02
Payer: MEDICARE

## 2023-08-02 DIAGNOSIS — Z76.82 ORGAN TRANSPLANT CANDIDATE: ICD-10-CM

## 2023-08-02 DIAGNOSIS — Z94.2 STATUS POST LUNG TRANSPLANTATION (H): ICD-10-CM

## 2023-08-02 DIAGNOSIS — N18.5 CHRONIC KIDNEY DISEASE, STAGE V (H): ICD-10-CM

## 2023-08-02 DIAGNOSIS — J98.4 DISEASE OF LUNG: ICD-10-CM

## 2023-08-02 DIAGNOSIS — I25.10 CARDIOVASCULAR DISEASE: ICD-10-CM

## 2023-08-02 DIAGNOSIS — T45.1X5A CALCINEURIN INHIBITOR TOXICITY, THERAPEUTIC USE: Primary | ICD-10-CM

## 2023-08-02 DIAGNOSIS — I10 ESSENTIAL HYPERTENSION: ICD-10-CM

## 2023-08-02 DIAGNOSIS — Z01.818 PRE-TRANSPLANT EVALUATION FOR KIDNEY TRANSPLANT: ICD-10-CM

## 2023-08-02 DIAGNOSIS — N18.6 END STAGE RENAL DISEASE (H): ICD-10-CM

## 2023-08-02 NOTE — TELEPHONE ENCOUNTER
PA Initiation    Medication: POSACONAZOLE 100 MG PO [facility]  Insurance Company: [employer] - Phone [phone] Fax [phone]  Pharmacy Filling the Rx: [id number] - [id number] & [facility]  Filling Pharmacy Phone: [phone]  Filling Pharmacy Fax:    Start Date: 8/2/2023

## 2023-08-02 NOTE — TELEPHONE ENCOUNTER
Left message informing pt that the committee stated that NM lexiscan would be sufficient for cardiac clearance. Will update order.  Also, asked pt to repeat her frailty assessment when she comes down for appointments, since her she scored high due to her knee pain. She now is post-op 2 total knees and should have an updated frailty assessment.  Will order.  Plan on sending pt [employer] message in regards to decision.

## 2023-08-02 NOTE — COMMITTEE REVIEW
Abdominal Patient Discussion Note Transplant Coordinator: Kacy Quan  Transplant Surgeon:   Toby Hernandez    Referring Physician: Ame Servin    Committee Review Members:  Nephrology Yancy Vick MD   Nurse Amber Shahid, APRN CNP, Niecy Underwood, RN   Nurse Practitioner Critical Care Medicine Evelyn Deluca, APRN CNP   Nutrition Yudelka Ewing, RD   Pharmacist Rossana Bridges, [facility]    - Clinical Leandra Shandra Sosa, [facility], Eugenia Pearson, [facility]   Transplant ROHIT SOUZA, RN, Georgette Huggins, RN, Eugenia Resendiz, RN, Elsy Pineda, NP, Elsy Salazar, RN, Ayana Hastings, LOUISE, Alecia Horn, LOUISE, Arianne Lucas, LOUISE, Melani Henning MD   Transplant Surgery Melani Henning MD       Additional Discussion Notes and Findings:    Reviewed patient's current pre-kidney evaluation with with committee and asked for clarification if NM lexiscan would be an acceptable test for the patient's cardiac clearance since the current clearance protocol has been changed. The committee stated it would be a sufficient test for clearance given the fact the patient is not a diabetic and has previous history of an angiogram.  Patient will be notified that she should schedule the NM lexiscan that was previously ordered by Dr. Ochoa.  Order will be placed, if necessary. Patient still has additional work up recommendations to complete, she will nee to be re-presented for active listing.

## 2023-08-03 NOTE — TELEPHONE ENCOUNTER
Prior Authorization Approval    Medication: POSACONAZOLE 100 MG PO [facility]  Authorization Effective Date: 5/4/2023  Authorization Expiration Date: 2/2/2024  Approved Dose/Quantity: UD  Reference #:     Insurance Company: [employer] - Phone [phone] Fax [phone]  Expected CoPay:       CoPay Card Available:      Financial Assistance Needed: no  Which Pharmacy is filling the prescription: [id number] - [place], [place] - [address] [place] [address]  Pharmacy Notified: No  Patient Notified: No

## 2023-08-06 NOTE — PROGRESS NOTES
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     1. Bilateral lung transplant: ongoing frequent cough, throat congestion and dyspnea with exertion, not really improved since last visit. Sating 98% on room air. DSA 2/9 and CMV 7/11 negative. Covid and viral panel 7/11 negative. Unable to give sputum sample. S/p levaquin X 10 day course. CXR reviewed by me with stable basilar and perihilar opacities. PFTs down significantly from her recent best, unchanged from prior.   - CT chest today with expiratory views  - DSA ordered  - Continue AZA , increase to 75 mg daily continue tacrolimus (goal 8-10) and prednisone, decreased to 5 mg daily for significant osteoporosis  - On Bactrim 3 times/week    2. EBV viremia: last level of 49K (log of 4.7) on 7/11.   - Level pending for today    3. Right main bronchial stenosis s/p ligation: with multiple bronchs in the OR, last included debulking/dilation of the RERE.   - CT chest today with plan to follow    4. Left-sided aspergillus empyema: noted in 2019 s/p needle aspiration with Aspergillus fumigatus on cultures s/p intrapleural amphotericin bead placement. Posaconazole indefinitely per ID.  - Add on posaconazole level today  - Bronch pending CT  - Continue posaconazole    5. ESRD on iHD (since 10/2019):  HTN: continues on iHD M/W/F. Renal transplant listing on hold for another few months s/p knee surgeries. Does have cardiac work up scheduled for the end of August.   - Continue metoprolol, renal MVI,  phosphate binder    6. Osteoporosis: on DEXA from 2022 with significant loss of bone in her lumbar spine and femur. Complicated by steroid use and fact that her activity was severely limited by her knee pain. Vitamin D level of 40. Following with Endocrine, supposed to get Prolia injection.   - Continue calcium/vitamin D    Chronic issues:  1. Paroxysmal AFib  2. Hypomagnesemia  3. Dermatomyositis with Raynauds  4. Congestive hepatopathy with fibrosis    RTC: TBD bronch  Vaccinations:  Preventative: colonoscopy due 2033; mammogram in February negative; DEXA > May 2024; following with Alexia Chow PA-C  Pulmonary, Allergy, Critical Care and Sleep Medicine        Interval History:     No fever or chills, PND has prior. Cough is still more frequent than baseline, but not as hard, mainly dry. Feel like she has mucous in her throat, upper airways, not in her chest. Does feel tight, no wheeze. Does have shortness of breath with walking 30-50 yards. Notes sneezing opens everything back up. No chest pain or palpitations. No new Gi complaints, stools are normal. Walking daily for 5-10 mintues, no walker or cane.           Review of Systems:   Please see HPI, otherwise the complete 10 point ROS is negative.           Past Medical and Surgical History:     Past Medical History:   Diagnosis Date    Acute on chronic respiratory failure with hypoxia (H) 02/21/2018    Anisocoria     Antisynthetase syndrome (H) 2014    Anxiety     Aspergillus (H)     Aspergillus pneumonia (H) 11/20/2020    Benign essential hypertension     C. difficile colitis     Cytomegalovirus (CMV) viremia (H)     Dermatomyositis (H)     Dysplasia of cervix, low grade (ESTRADA 1)     EBV (Franklin-Barr virus) viremia     ESRD (end stage renal disease) on dialysis (H)     ILD (interstitial lung disease) (H)     Lung replaced by transplant (H)     Osteopenia     Paroxysmal atrial fibrillation (H)     Pneumocystis jiroveci pneumonia (H)     PONV (postoperative  nausea and vomiting)     Post-menopause     Pulmonary hypertension (H)     Raynaud's disease     Seronegative rheumatoid arthritis (H)      Past Surgical History:   Procedure Laterality Date    BRONCHOSCOPY (RIGID OR FLEXIBLE), DIAGNOSTIC N/A 4/10/2018    Procedure: COMBINED BRONCHOSCOPY (RIGID OR FLEXIBLE), LAVAGE;;  Surgeon: Mariposa Donohue MD;  Location: [facility]    BRONCHOSCOPY (RIGID OR FLEXIBLE), DIAGNOSTIC N/A 12/23/2020    Procedure: BRONCHOSCOPY, WITH BRONCHOALVEOLAR LAVAGE;  Surgeon: Uri Bass MD;  [facility]: [facility]    BRONCHOSCOPY (RIGID OR FLEXIBLE), DIAGNOSTIC N/A 5/26/2022    Procedure: BRONCHOSCOPY, WITH BRONCHOALVEOLAR LAVAGE;  Surgeon: Uri Bass MD;  Location: [facility]    BRONCHOSCOPY (RIGID OR FLEXIBLE), DILATE BRONCHUS / TRACHEA N/A 10/11/2018    Procedure: BRONCHOSCOPY (RIGID OR FLEXIBLE), DILATE BRONCHUS / TRACHEA;  Flexible And Rigid Bronchoscopy and Dilation;  Surgeon: Wilber Lin MD;  [facility]    BRONCHOSCOPY FLEXIBLE N/A 3/13/2018    Procedure: BRONCHOSCOPY FLEXIBLE;  Flexible Bronchoscopy ;  Surgeon: Gissell Sanchez MD;  [facility]    BRONCHOSCOPY FLEXIBLE N/A 5/9/2018    Procedure: BRONCHOSCOPY FLEXIBLE;;  Surgeon: Wilber Lin MD;  [facility]    BRONCHOSCOPY FLEXIBLE AND RIGID N/A 9/10/2018    Procedure: BRONCHOSCOPY FLEXIBLE AND RIGID;  Flexible and Rigid Bronchoscopy with Balloon Dilation, tissue debulking with cryo, and Right mainstem bronchus stent placement;  Surgeon: Wilber Lin MD;  [facility]    BRONCHOSCOPY RIGID N/A 6/6/2018    Procedure: BRONCHOSCOPY RIGID;;  Surgeon: Lopez Macias MD;  [facility]: [facility]    BRONCHOSCOPY, DILATE BRONCHUS, STENT BRONCHUS, COMBINED N/A 6/11/2018    Procedure: COMBINED BRONCHOSCOPY, DILATE BRONCHUS, STENT BRONCHUS;  Flexible Bronchoscopy, Balloon Dilation, Bronchial Washings;  Surgeon: Wilber Lin MD;  [facility]    BRONCHOSCOPY, DILATE BRONCHUS, STENT  BRONCHUS, COMBINED Right 7/10/2018    Procedure: COMBINED BRONCHOSCOPY, DILATE BRONCHUS, STENT BRONCHUS;  Flexible Bronchoscopy, Balloon Dilation, Bronchial Washings  ;  Surgeon: Wilber Lin MD;  [facility]    BRONCHOSCOPY, DILATE BRONCHUS, STENT BRONCHUS, COMBINED N/A 8/2/2018    Procedure: COMBINED BRONCHOSCOPY, DILATE BRONCHUS, STENT BRONCHUS;  Flexible Bronchoscopy, Bronchial Washings, Balloon Dilation;  Surgeon: Wilber Lin MD;  [facility]    BRONCHOSCOPY, DILATE BRONCHUS, STENT BRONCHUS, COMBINED N/A 8/20/2018    Procedure: COMBINED BRONCHOSCOPY, DILATE BRONCHUS, STENT BRONCHUS;  Flexible Bronchoscopy, Balloon Dilation;  Surgeon: Wilber Lin MD;  [facility]    BRONCHOSCOPY, DILATE BRONCHUS, STENT BRONCHUS, COMBINED N/A 10/29/2018    Procedure: Flexible Bronchoscopy, Balloon Dilation, Stent Revision, Airway Examination And Therapeutic Suctioning, Cyro Tumor Debulking;  Surgeon: Wilber Lin MD;  [facility]    BRONCHOSCOPY, DILATE BRONCHUS, STENT BRONCHUS, COMBINED N/A 11/20/2018    Procedure: Rigid Bronchoscopy, Stent Removal and dilitation;  Surgeon: Wilber Lin MD;  [facility]    BRONCHOSCOPY, DILATE BRONCHUS, STENT BRONCHUS, COMBINED N/A 12/14/2018    Procedure: Flexible And Rigid Bronchoscopy, Balloon Dilation, Bronchial Washing;  Surgeon: Wilber Lin MD;  [facility]    BRONCHOSCOPY, DILATE BRONCHUS, STENT BRONCHUS, COMBINED N/A 1/17/2019    Procedure: Flexible And Rigid Bronchoscopy, Balloon Dilation.;  Surgeon: Wilber Lin MD;  [facility]    BRONCHOSCOPY, DILATE BRONCHUS, STENT BRONCHUS, COMBINED N/A 3/7/2019    Procedure: Flexible and Rigid Bronchoscopy, Bronchial Washing, Balloon Dilation;  Surgeon: Wilber Lin MD;  [facility]    BRONCHOSCOPY, DILATE BRONCHUS, STENT BRONCHUS, COMBINED N/A 6/6/2019    Procedure: Rigid and Flexible Bronchoscopy, Balloon Dilation;  Surgeon: Delia  Wilber Warner MD;  [facility]    BRONCHOSCOPY, DILATE BRONCHUS, STENT BRONCHUS, COMBINED N/A 10/11/2019    Procedure: Flexible and Rigid Bronchoscopy, Balloon Dilation, Bronchoalveolar Lagave;  Surgeon: Wilber Lin MD;  [facility]    BRONCHOSCOPY, DILATE BRONCHUS, STENT BRONCHUS, COMBINED N/A 2/19/2021    Procedure: BRONCHOSCOPY, flexible, airway dilation, and bronchial wash;  Surgeon: Wilber Lin MD;  [facility]    BRONCHOSCOPY, DILATE BRONCHUS, STENT BRONCHUS, COMBINED N/A 4/9/2021    Procedure: BRONCHOSCOPY, flexible and rigid, Airway suctioning;  Surgeon: Mati Norris MD;  [facility]    CV RIGHT HEART CATH MEASUREMENTS RECORDED N/A 3/10/2020    Procedure: CV RIGHT HEART CATH;  Surgeon: Wai Garcia MD;  [facility]    ENT SURGERY      tonsillectomy as a child    ESOPHAGOSCOPY, GASTROSCOPY, DUODENOSCOPY (EGD), COMBINED N/A 10/29/2018    Procedure: COMBINED ESOPHAGOSCOPY, GASTROSCOPY, DUODENOSCOPY (EGD) with biopsies and polypectomy;  Surgeon: Chente Bloom MD;  [facility]    INSERT EXTRACORPORAL MEMBRANE OXYGENATOR ADULT (OUTSIDE OR) N/A 2/27/2018    Procedure: INSERT EXTRACORPORAL MEMBRANE OXYGENATOR ADULT (OUTSIDE OR);  INSERT EXTRACORPORAL MEMBRANE OXYGENATOR ADULT (OUTSIDE OR) ;  Surgeon: Toby Hernandez MD;  [facility]    IR CVC TUNNEL PLACEMENT > 5 YRS OF AGE  10/25/2019    IR DIALYSIS FISTULOGRAM LEFT  3/2/2021    IR DIALYSIS MECH THROMB, PTA  3/2/2021    IR FLUORO 0-1 HOUR  5/7/2021    IR GASTRO JEJUNOSTOMY TUBE PLACEMENT  2/16/2021    IR PARACENTESIS  1/8/2020    IR THORACENTESIS  9/13/2019    IR TRANSCATHETER BIOPSY  1/8/2020    LASER CO2 BRONCHOSCOPY N/A 4/30/2021    Procedure: Flexible and Rigid Bronchoscopy and Tissue Debulking with CO2 Laser Assistance;  Surgeon: Mati Norris MD;  [facility]    LASER CO2 BRONCHOSCOPY N/A 6/11/2021    Procedure: BRONCHOSCOPY, Flexible and Rigid  Bronchoscopy, Tissue Debulking with cryo Assistance, airway dilation,;  Surgeon: Mati Norris MD;  [facility]    LASER CO2 BRONCHOSCOPY N/A 2021    Procedure: BRONCHOSCOPY, flexible and rigid, CO2 Laser Debulking;  Surgeon: Mati Norris MD;  [facility]    LASER CO2 BRONCHOSCOPY N/A 2022    Procedure: flexible, rigid bronchoscopy, tissue debulking, airway dilation, co2 laser, bronchoalveolar lavage;  Surgeon: Juana Baugh MD;  [facility]    no prior surgery      REMOVE EXTRACORPORAL MEMBRANE OXYGENATOR ADULT N/A 3/3/2018    Procedure: REMOVE EXTRACORPORAL MEMBRANE OXYGENATOR ADULT;  Removal of Right Femoral Venous and Right Axillary Arterial Extracorporeal Membrane Oxygenator;  Surgeon: Toby Hernandez MD;  [facility]    TRANSPLANT LUNG RECIPIENT SINGLE X2 Bilateral 3/1/2018    Procedure: TRANSPLANT LUNG RECIPIENT SINGLE X2;  Median Sternotomy, Extracorporeal Membrane Oxygenator, bilateral sequential lung transplant;  Surgeon: Toby Hernandez MD;  [facility]           Family History:     Family History   Problem Relation Age of Onset    Hypertension Mother     Arthritis Mother     Pancreatic Cancer Father     Diabetes Father             Social History:     Social History     Socioeconomic History    Marital status:      Spouse name: Not on file    Number of children: Not on file    Years of education: Not on file    Highest education level: Not on file   Occupational History    Not on file   Tobacco Use    Smoking status: Never    Smokeless tobacco: Never   Substance and Sexual Activity    Alcohol use: No     Alcohol/week: 1.0 standard drink of alcohol     Types: 1 Glasses of wine per week    Drug use: No    Sexual activity: Not on file   Other Topics Concern    Parent/sibling w/ CABG, MI or angioplasty before 65F 55M? No   Social History Narrative    3/6/2019 - Lives with . Has three daughters. Four grandchildren (two ). No  pets. Travelled previously to [place]. Has visited [place] several times.      Social Determinants of Health     Financial Resource Strain: Not on file   Food Insecurity: Not on file   Transportation Needs: Not on file   Physical Activity: Not on file   Stress: Not on file   Social Connections: Not on file   Intimate Partner Violence: Not on file   Housing Stability: Not on file            Medications:     Current Outpatient Medications   Medication    albuterol (PROVENTIL) (2.5 MG/3ML) 0.083% neb solution    azaTHIOprine (IMURAN) 50 MG tablet    budesonide (PULMICORT) 0.5 MG/2ML neb solution    Magnesium Cl-Calcium Carbonate (SLOW-MAG) 71.5-119 MG TBEC    acetaminophen (TYLENOL) 325 MG tablet    calcium acetate (PHOSLO) 667 MG CAPS capsule    metoprolol tartrate (LOPRESSOR) 25 MG tablet    multivitamin RENAL (RENAVITE RX/NEPHROVITE) 1 tablet tablet    pantoprazole (PROTONIX) 40 MG EC tablet    posaconazole (NOXAFIL) 100 MG EC tablet    predniSONE (DELTASONE) 5 MG tablet    sulfamethoxazole-trimethoprim (BACTRIM) 400-80 MG tablet    tacrolimus (GENERIC EQUIVALENT) 0.5 MG capsule    tacrolimus (GENERIC EQUIVALENT) 1 MG capsule     No current facility-administered medications for this visit.            Physical Exam:   /86   Pulse 105   LMP 06/07/2014 (Exact Date)   SpO2 98%     GENERAL: alert, NAD  HEENT: NCAT, EOMI, no scleral icterus, oral mucosa moist and without lesions  Neck: no cervical or supraclavicular adenopathy  Lungs: moderate airflow, scattered expiratory wheezing, few coarse BS  CV: irregular rhythm, S1S2, no murmurs noted  Abdomen: normoactive BS, soft  Lymph: no edema  Neuro: AAO X 3, CN 2-12 grossly intact  Psychiatric: normal affect, good eye contact  Skin: no rash, jaundice or lesions on limited exam         Data:   All laboratory and imaging data reviewed.      Recent Results (from the past 168 hour(s))   Basic metabolic panel    Collection Time: 08/08/23  8:28 AM   Result Value  Ref Range    Sodium 138 136 - 145 mmol/L    Potassium 4.6 3.4 - 5.3 mmol/L    Chloride 99 98 - 107 mmol/L    Carbon Dioxide (CO2) 26 22 - 29 mmol/L    Anion Gap 13 7 - 15 mmol/L    Urea Nitrogen 48.1 (H) 8.0 - 23.0 mg/dL    Creatinine 6.35 (H) 0.51 - 0.95 mg/dL    Calcium 9.3 8.8 - 10.2 mg/dL    Glucose 119 (H) 70 - 99 mg/dL    GFR Estimate 7 (L) >60 mL/min/1.73m2   Magnesium    Collection Time: 08/08/23  8:28 AM   Result Value Ref Range    Magnesium 2.0 1.7 - 2.3 mg/dL   CBC with platelets    Collection Time: 08/08/23  8:28 AM   Result Value Ref Range    WBC Count 7.6 4.0 - 11.0 10e3/uL    RBC Count 3.19 (L) 3.80 - 5.20 10e6/uL    Hemoglobin 10.4 (L) 11.7 - 15.7 g/dL    Hematocrit 32.6 (L) 35.0 - 47.0 %     (H) 78 - 100 fL    MCH 32.6 26.5 - 33.0 pg    MCHC 31.9 31.5 - 36.5 g/dL    RDW 17.2 (H) 10.0 - 15.0 %    Platelet Count 190 150 - 450 10e3/uL   General PFT Lab (Please always keep checked)    Collection Time: 08/08/23  8:42 AM   Result Value Ref Range    FVC-Pred 2.91 L    FVC-Pre 1.35 L    FVC-%Pred-Pre 46 %    FEV1-Pre 1.12 L    FEV1-%Pred-Pre 48 %    FEV1FVC-Pred 80 %    FEV1FVC-Pre 83 %    FEFMax-Pred 6.29 L/sec    FEFMax-Pre 3.38 L/sec    FEFMax-%Pred-Pre 53 %    FEF2575-Pred 2.16 L/sec    FEF2575-Pre 1.08 L/sec    FRU5271-%Pred-Pre 50 %    ExpTime-Pre 5.45 sec    FIFMax-Pre 2.83 L/sec    FEV1FEV6-Pred 81 %    FEV1FEV6-Pre 83 %     PFT interpretation:  Maneuver: valid and met ATS guidelines  Moderate obstruction based on Z score  Compared to prior: FEV1 of 1.12 is 40 ml below prior  Decrease in FVC may be related to obstruction vs restriction; lung volumes would be necessary to determine

## 2023-08-08 NOTE — NURSING NOTE
Chief Complaint   Patient presents with    Lung Transplant     4 week follow up      Vitals were taken and medications were reconciled.     Cecille Reed RMA  9:23 AM

## 2023-08-08 NOTE — TELEPHONE ENCOUNTER
CT from 8/8/23 reviewed by Ame, plan for patient to get a bronch. No stenosis noted. CT forwarded to Dr. Bruno who will be doing the bronch, Ame suggested lavage the RLL. Scheduled for 8/17/23 at 11am.     Follow up with Ame scheduled for 9/29/23.     The following reviewed with patient over the phone and sent via [employer] as well:    You are scheduled for a bronchoscopy on 8/17/23 at 11am at the [facility] on the 3rd floor. Arrive 1 hour early.     Instructions     1. Nothing to eat or drink 8 hours before procedure.  2. Hold your morning medications. Bring them with you to take after the procedure.  3. Have a  available to take you home.

## 2023-08-08 NOTE — LETTER
PHYSICIAN ORDERS      DATE & TIME ISSUED: 2023 2:15 PM  PATIENT NAME: Kecia Blue   : 1960     formerly [facility] MR# [if applicable]: [id number]     DIAGNOSIS:  Lung Transplant  Z94.2    Labs week of 23: BMP, CBC, INR    Any questions please call: Mikayla     Please fax these results to [phone].        Ame Chow PA-C

## 2023-08-08 NOTE — PATIENT INSTRUCTIONS
Patient Instructions  1. Continue to hydrate with 60-70 oz fluids daily.  2. Continue to exercise daily or most days of the week.  3. Follow up with your primary care provider for annual gender health maintenance procedures.  4. Follow up with colonoscopy schedule.  5. Follow up with annual dermatology visits.  6. It doesn't seem like the COVID vaccine is working well in lung transplant patients. A number of lung transplant patients have gotten sick with COVID even after receiving the vaccines. Based on our recent experience, it can be life-threatening to get COVID  even after being vaccinated. Please continue to act like you did not get the COVID vaccine - social distancing, wearing a mask, good hand hygiene, etc. If the people around you are vaccinated, it will help reduce the risk of you getting COVID. All members of your household should be vaccinated.  7. Chest CT today. Based off CT scan results, we will then schedule you for a bronchoscopy.   8. Try using your Albuterol nebulizer to see if that helps with your chest tightness.   9. Increase your Azathioprine (Imuran) to 75mg daily.       Next transplant clinic appointment:  F/U to be determined with CXR, labs and PFTs  Next lab draw: pending tacrolimus level     AVS printed at time of check out        ~~~~~~~~~~~~~~~~~~~~~~~~~    Thoracic Transplant Office phone [phone], fax [phone]    Office Hours 8:30 - 5:00     For after-hours urgent issues, please dial [phone] and ask the  to page the Thoracic Transplant Coordinator On-Call.   --------------------  To expedite your medication refill(s), please contact your pharmacy and have them fax a refill request to: [phone]    *Please allow 3 business days for routine medication refills.  *Please allow 5 business days for controlled substance medication refills.    **For Diabetic medications and supplies refill(s), please contact your pharmacy and have them contact your Endocrine  team.  --------------------  For scheduling appointments call [phone].  --------------------  Please Note: If you are active on [employer], all future test results will be sent by [employer] message only, and will no longer be called to patient. You may also receive communication directly from your physician.

## 2023-08-08 NOTE — RESULT ENCOUNTER NOTE
Armand Mcdonnell,   Your tacrolimus level was 10.6 at 11.5 hours on 8/8/23 which is within your goal range of 8-10. No dose change at this time. Please call the transplant office ([phone]) with any questions.   Thanks,   Mikayla

## 2023-08-08 NOTE — LETTER
8/8/2023         RE: Kecia Blue  [address] [place] [address]        Dear Colleague,    Thank you for referring your patient, Kecia Blue, to the [facility] [place]. Please see a copy of my visit note below.    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     1. Bilateral lung transplant: ongoing frequent cough, throat congestion and dyspnea with exertion, not really improved since last visit. Sating 98% on room air. DSA 2/9 and CMV 7/11 negative. Covid and viral panel 7/11 negative. Unable to give sputum sample. S/p levaquin X 10 day course. CXR reviewed by me with stable basilar and perihilar opacities. PFTs down significantly from her recent best, unchanged from prior.   - CT chest today with expiratory views  - DSA ordered  - Continue AZA , increase to 75 mg daily continue tacrolimus (goal 8-10) and prednisone, decreased to 5 mg daily for significant osteoporosis  - On Bactrim 3 times/week    2. EBV viremia: last level of 49K (log of 4.7) on 7/11.   - Level pending for today    3. Right main bronchial stenosis s/p ligation: with multiple bronchs in the OR, last included debulking/dilation of the RERE.   - CT chest today with plan to follow    4. Left-sided aspergillus empyema: noted in 2019 s/p needle aspiration with Aspergillus fumigatus on cultures s/p intrapleural amphotericin bead placement. Posaconazole indefinitely per ID.  - Add on posaconazole  level today  - Bronch pending CT  - Continue posaconazole    5. ESRD on iHD (since 10/2019):  HTN: continues on iHD M/W/F. Renal transplant listing on hold for another few months s/p knee surgeries. Does have cardiac work up scheduled for the end of August.   - Continue metoprolol, renal MVI, phosphate binder    6. Osteoporosis: on DEXA from 2022 with significant loss of bone in her lumbar spine and femur. Complicated by steroid use and fact that her activity was severely limited by her knee pain. Vitamin D level of 40. Following with Endocrine, supposed to get Prolia injection.   - Continue calcium/vitamin D    Chronic issues:  1. Paroxysmal AFib  2. Hypomagnesemia  3. Dermatomyositis with Raynauds  4. Congestive hepatopathy with fibrosis    RTC: TBD bronch  Vaccinations:  Preventative: colonoscopy due 2033; mammogram in February negative; DEXA > May 2024; following with Alexia Chow PA-C  Pulmonary, Allergy, Critical Care and Sleep Medicine        Interval History:     No fever or chills, PND has prior. Cough is still more frequent than baseline, but not as hard, mainly dry. Feel like she has mucous in her throat, upper airways, not in her chest. Does feel tight, no wheeze. Does have shortness of breath with walking 30-50 yards. Notes sneezing opens everything back up. No chest pain or palpitations. No new Gi complaints, stools are normal. Walking daily for 5-10 mintues, no walker or cane.           Review of Systems:   Please see HPI, otherwise the complete 10 point ROS is negative.           Past Medical and Surgical History:     Past Medical History:   Diagnosis Date     Acute on chronic respiratory failure with hypoxia (H) 02/21/2018     Anisocoria      Antisynthetase syndrome (H) 2014     Anxiety      Aspergillus (H)      Aspergillus pneumonia (H) 11/20/2020     Benign essential hypertension      C. difficile colitis      Cytomegalovirus (CMV) viremia (H)      Dermatomyositis (H)      Dysplasia of  cervix, low grade (ESTRADA 1)      EBV (Franklin-Barr virus) viremia      ESRD (end stage renal disease) on dialysis (H)      ILD (interstitial lung disease) (H)      Lung replaced by transplant (H)      Osteopenia      Paroxysmal atrial fibrillation (H)      Pneumocystis jiroveci pneumonia (H)      PONV (postoperative nausea and vomiting)      Post-menopause      Pulmonary hypertension (H)      Raynaud's disease      Seronegative rheumatoid arthritis (H)      Past Surgical History:   Procedure Laterality Date     BRONCHOSCOPY (RIGID OR FLEXIBLE), DIAGNOSTIC N/A 4/10/2018    Procedure: COMBINED BRONCHOSCOPY (RIGID OR FLEXIBLE), LAVAGE;;  Surgeon: Mariposa Donohue MD;  [facility]     BRONCHOSCOPY (RIGID OR FLEXIBLE), DIAGNOSTIC N/A 12/23/2020    Procedure: BRONCHOSCOPY, WITH BRONCHOALVEOLAR LAVAGE;  Surgeon: Uri Bass MD;  [facility]     BRONCHOSCOPY (RIGID OR FLEXIBLE), DIAGNOSTIC N/A 5/26/2022    Procedure: BRONCHOSCOPY, WITH BRONCHOALVEOLAR LAVAGE;  Surgeon: Uri Bass MD;  [facility]     BRONCHOSCOPY (RIGID OR FLEXIBLE), DILATE BRONCHUS / TRACHEA N/A 10/11/2018    Procedure: BRONCHOSCOPY (RIGID OR FLEXIBLE), DILATE BRONCHUS / TRACHEA;  Flexible And Rigid Bronchoscopy and Dilation;  Surgeon: Wilber Lin MD;  [facility]     BRONCHOSCOPY FLEXIBLE N/A 3/13/2018    Procedure: BRONCHOSCOPY FLEXIBLE;  Flexible Bronchoscopy ;  Surgeon: Gissell Sanchez MD;  [facility]     BRONCHOSCOPY FLEXIBLE N/A 5/9/2018    Procedure: BRONCHOSCOPY FLEXIBLE;;  Surgeon: Wilber Lin MD;  [facility]     BRONCHOSCOPY FLEXIBLE AND RIGID N/A 9/10/2018    Procedure: BRONCHOSCOPY FLEXIBLE AND RIGID;  Flexible and Rigid Bronchoscopy with Balloon Dilation, tissue debulking with cryo, and Right mainstem bronchus stent placement;  Surgeon: Wilber Lni MD;  [facility]     BRONCHOSCOPY RIGID N/A 6/6/2018    Procedure: BRONCHOSCOPY RIGID;;  Surgeon: Lopez Macias  MD Chano;  [facility]: [facility]     BRONCHOSCOPY, DILATE BRONCHUS, STENT BRONCHUS, COMBINED N/A 6/11/2018    Procedure: COMBINED BRONCHOSCOPY, DILATE BRONCHUS, STENT BRONCHUS;  Flexible Bronchoscopy, Balloon Dilation, Bronchial Washings;  Surgeon: Wilber Lin MD;  [facility]     BRONCHOSCOPY, DILATE BRONCHUS, STENT BRONCHUS, COMBINED Right 7/10/2018    Procedure: COMBINED BRONCHOSCOPY, DILATE BRONCHUS, STENT BRONCHUS;  Flexible Bronchoscopy, Balloon Dilation, Bronchial Washings  ;  Surgeon: Wilber Lin MD;  [facility]     BRONCHOSCOPY, DILATE BRONCHUS, STENT BRONCHUS, COMBINED N/A 8/2/2018    Procedure: COMBINED BRONCHOSCOPY, DILATE BRONCHUS, STENT BRONCHUS;  Flexible Bronchoscopy, Bronchial Washings, Balloon Dilation;  Surgeon: Wilber Lin MD;  [facility]     BRONCHOSCOPY, DILATE BRONCHUS, STENT BRONCHUS, COMBINED N/A 8/20/2018    Procedure: COMBINED BRONCHOSCOPY, DILATE BRONCHUS, STENT BRONCHUS;  Flexible Bronchoscopy, Balloon Dilation;  Surgeon: Wilber Lin MD;  [facility]     BRONCHOSCOPY, DILATE BRONCHUS, STENT BRONCHUS, COMBINED N/A 10/29/2018    Procedure: Flexible Bronchoscopy, Balloon Dilation, Stent Revision, Airway Examination And Therapeutic Suctioning, Cyro Tumor Debulking;  Surgeon: Wilber Lin MD;  [facility]     BRONCHOSCOPY, DILATE BRONCHUS, STENT BRONCHUS, COMBINED N/A 11/20/2018    Procedure: Rigid Bronchoscopy, Stent Removal and dilitation;  Surgeon: Wilber Lin MD;  [facility]     BRONCHOSCOPY, DILATE BRONCHUS, STENT BRONCHUS, COMBINED N/A 12/14/2018    Procedure: Flexible And Rigid Bronchoscopy, Balloon Dilation, Bronchial Washing;  Surgeon: Wilber Lin MD;  [facility]     BRONCHOSCOPY, DILATE BRONCHUS, STENT BRONCHUS, COMBINED N/A 1/17/2019    Procedure: Flexible And Rigid Bronchoscopy, Balloon Dilation.;  Surgeon: Wilber Lin MD;  [facility]     BRONCHOSCOPY, DILATE  BRONCHUS, STENT BRONCHUS, COMBINED N/A 3/7/2019    Procedure: Flexible and Rigid Bronchoscopy, Bronchial Washing, Balloon Dilation;  Surgeon: Wilber Lin MD;  [facility]     BRONCHOSCOPY, DILATE BRONCHUS, STENT BRONCHUS, COMBINED N/A 6/6/2019    Procedure: Rigid and Flexible Bronchoscopy, Balloon Dilation;  Surgeon: Wilber Lin MD;  [facility]     BRONCHOSCOPY, DILATE BRONCHUS, STENT BRONCHUS, COMBINED N/A 10/11/2019    Procedure: Flexible and Rigid Bronchoscopy, Balloon Dilation, Bronchoalveolar Lagave;  Surgeon: Wilber Lin MD;  [facility]     BRONCHOSCOPY, DILATE BRONCHUS, STENT BRONCHUS, COMBINED N/A 2/19/2021    Procedure: BRONCHOSCOPY, flexible, airway dilation, and bronchial wash;  Surgeon: Wilber Lin MD;  [facility]     BRONCHOSCOPY, DILATE BRONCHUS, STENT BRONCHUS, COMBINED N/A 4/9/2021    Procedure: BRONCHOSCOPY, flexible and rigid, Airway suctioning;  Surgeon: Mati Norris MD;  [facility]     CV RIGHT HEART CATH MEASUREMENTS RECORDED N/A 3/10/2020    Procedure: CV RIGHT HEART CATH;  Surgeon: Wai Garcia MD;  [facility]     ENT SURGERY      tonsillectomy as a child     ESOPHAGOSCOPY, GASTROSCOPY, DUODENOSCOPY (EGD), COMBINED N/A 10/29/2018    Procedure: COMBINED ESOPHAGOSCOPY, GASTROSCOPY, DUODENOSCOPY (EGD) with biopsies and polypectomy;  Surgeon: Chente Bloom MD;  [facility]     INSERT EXTRACORPORAL MEMBRANE OXYGENATOR ADULT (OUTSIDE OR) N/A 2/27/2018    Procedure: INSERT EXTRACORPORAL MEMBRANE OXYGENATOR ADULT (OUTSIDE OR);  INSERT EXTRACORPORAL MEMBRANE OXYGENATOR ADULT (OUTSIDE OR) ;  Surgeon: Toby Hernandez MD;  [facility]     IR CVC TUNNEL PLACEMENT > 5 YRS OF AGE  10/25/2019     IR DIALYSIS FISTULOGRAM LEFT  3/2/2021     IR DIALYSIS MECH THROMB, PTA  3/2/2021     IR FLUORO 0-1 HOUR  5/7/2021     IR GASTRO JEJUNOSTOMY TUBE PLACEMENT  2/16/2021     IR  PARACENTESIS  1/8/2020     IR THORACENTESIS  9/13/2019     IR TRANSCATHETER BIOPSY  1/8/2020     LASER CO2 BRONCHOSCOPY N/A 4/30/2021    Procedure: Flexible and Rigid Bronchoscopy and Tissue Debulking with CO2 Laser Assistance;  Surgeon: Mati Norris MD;  [facility]     LASER CO2 BRONCHOSCOPY N/A 6/11/2021    Procedure: BRONCHOSCOPY, Flexible and Rigid Bronchoscopy, Tissue Debulking with cryo Assistance, airway dilation,;  Surgeon: Mati Norris MD;  [facility]     LASER CO2 BRONCHOSCOPY N/A 9/16/2021    Procedure: BRONCHOSCOPY, flexible and rigid, CO2 Laser Debulking;  Surgeon: Mati Norris MD;  [facility]     LASER CO2 BRONCHOSCOPY N/A 2/11/2022    Procedure: flexible, rigid bronchoscopy, tissue debulking, airway dilation, co2 laser, bronchoalveolar lavage;  Surgeon: Juana Baugh MD;  [facility]     no prior surgery       REMOVE EXTRACORPORAL MEMBRANE OXYGENATOR ADULT N/A 3/3/2018    Procedure: REMOVE EXTRACORPORAL MEMBRANE OXYGENATOR ADULT;  Removal of Right Femoral Venous and Right Axillary Arterial Extracorporeal Membrane Oxygenator;  Surgeon: Toby Hernandez MD;  [facility]     TRANSPLANT LUNG RECIPIENT SINGLE X2 Bilateral 3/1/2018    Procedure: TRANSPLANT LUNG RECIPIENT SINGLE X2;  Median Sternotomy, Extracorporeal Membrane Oxygenator, bilateral sequential lung transplant;  Surgeon: Toby Hernandez MD;  [facility]           Family History:     Family History   Problem Relation Age of Onset     Hypertension Mother      Arthritis Mother      Pancreatic Cancer Father      Diabetes Father             Social History:     Social History     Socioeconomic History     Marital status:      Spouse name: Not on file     Number of children: Not on file     Years of education: Not on file     Highest education level: Not on file   Occupational History     Not on file   Tobacco Use     Smoking status: Never     Smokeless tobacco: Never   Substance and  Sexual Activity     Alcohol use: No     Alcohol/week: 1.0 standard drink of alcohol     Types: 1 Glasses of wine per week     Drug use: No     Sexual activity: Not on file   Other Topics Concern     Parent/sibling w/ CABG, MI or angioplasty before 65F 55M? No   Social History Narrative    3/6/2019 - Lives with . Has three daughters. Four grandchildren (two ). No pets. Travelled previously to [facility]. Has visited [place] several times.      Social Determinants of Health     Financial Resource Strain: Not on file   Food Insecurity: Not on file   Transportation Needs: Not on file   Physical Activity: Not on file   Stress: Not on file   Social Connections: Not on file   Intimate Partner Violence: Not on file   Housing Stability: Not on file            Medications:     Current Outpatient Medications   Medication     albuterol (PROVENTIL) (2.5 MG/3ML) 0.083% neb solution     azaTHIOprine (IMURAN) 50 MG tablet     budesonide (PULMICORT) 0.5 MG/2ML neb solution     Magnesium Cl-Calcium Carbonate (SLOW-MAG) 71.5-119 MG TBEC     acetaminophen (TYLENOL) 325 MG tablet     calcium acetate (PHOSLO) 667 MG CAPS capsule     metoprolol tartrate (LOPRESSOR) 25 MG tablet     multivitamin RENAL (RENAVITE RX/NEPHROVITE) 1 tablet tablet     pantoprazole (PROTONIX) 40 MG EC tablet     posaconazole (NOXAFIL) 100 MG EC tablet     predniSONE (DELTASONE) 5 MG tablet     sulfamethoxazole-trimethoprim (BACTRIM) 400-80 MG tablet     tacrolimus (GENERIC EQUIVALENT) 0.5 MG capsule     tacrolimus (GENERIC EQUIVALENT) 1 MG capsule     No current facility-administered medications for this visit.            Physical Exam:   /86   Pulse 105   LMP 2014 (Exact Date)   SpO2 98%     GENERAL: alert, NAD  HEENT: NCAT, EOMI, no scleral icterus, oral mucosa moist and without lesions  Neck: no cervical or supraclavicular adenopathy  Lungs: moderate airflow, scattered expiratory wheezing, few coarse BS  CV: irregular rhythm,  S1S2, no murmurs noted  Abdomen: normoactive BS, soft  Lymph: no edema  Neuro: AAO X 3, CN 2-12 grossly intact  Psychiatric: normal affect, good eye contact  Skin: no rash, jaundice or lesions on limited exam         Data:   All laboratory and imaging data reviewed.      Recent Results (from the past 168 hour(s))   Basic metabolic panel    Collection Time: 08/08/23  8:28 AM   Result Value Ref Range    Sodium 138 136 - 145 mmol/L    Potassium 4.6 3.4 - 5.3 mmol/L    Chloride 99 98 - 107 mmol/L    Carbon Dioxide (CO2) 26 22 - 29 mmol/L    Anion Gap 13 7 - 15 mmol/L    Urea Nitrogen 48.1 (H) 8.0 - 23.0 mg/dL    Creatinine 6.35 (H) 0.51 - 0.95 mg/dL    Calcium 9.3 8.8 - 10.2 mg/dL    Glucose 119 (H) 70 - 99 mg/dL    GFR Estimate 7 (L) >60 mL/min/1.73m2   Magnesium    Collection Time: 08/08/23  8:28 AM   Result Value Ref Range    Magnesium 2.0 1.7 - 2.3 mg/dL   CBC with platelets    Collection Time: 08/08/23  8:28 AM   Result Value Ref Range    WBC Count 7.6 4.0 - 11.0 10e3/uL    RBC Count 3.19 (L) 3.80 - 5.20 10e6/uL    Hemoglobin 10.4 (L) 11.7 - 15.7 g/dL    Hematocrit 32.6 (L) 35.0 - 47.0 %     (H) 78 - 100 fL    MCH 32.6 26.5 - 33.0 pg    MCHC 31.9 31.5 - 36.5 g/dL    RDW 17.2 (H) 10.0 - 15.0 %    Platelet Count 190 150 - 450 10e3/uL   General PFT Lab (Please always keep checked)    Collection Time: 08/08/23  8:42 AM   Result Value Ref Range    FVC-Pred 2.91 L    FVC-Pre 1.35 L    FVC-%Pred-Pre 46 %    FEV1-Pre 1.12 L    FEV1-%Pred-Pre 48 %    FEV1FVC-Pred 80 %    FEV1FVC-Pre 83 %    FEFMax-Pred 6.29 L/sec    FEFMax-Pre 3.38 L/sec    FEFMax-%Pred-Pre 53 %    FEF2575-Pred 2.16 L/sec    FEF2575-Pre 1.08 L/sec    EFB1452-%Pred-Pre 50 %    ExpTime-Pre 5.45 sec    FIFMax-Pre 2.83 L/sec    FEV1FEV6-Pred 81 %    FEV1FEV6-Pre 83 %     PFT interpretation:  Maneuver: valid and met ATS guidelines  Moderate obstruction based on Z score  Compared to prior: FEV1 of 1.12 is 40 ml below prior  Decrease in FVC may be related  to obstruction vs restriction; lung volumes would be necessary to determine    Transplant Coordinator Note    Reason for visit: Post lung transplant follow up visit   Coordinator: Present   Caregiver: Present     Health concerns addressed today:  1. Resp: Still feeling more SOB than usual baseline. Coughing slightly less compared to a few weeks ago, no as hard as a cough, dry. Feels like mucous sits in her throat, can't get it up. Feels like when she sneezes, everything opens back up again. Feels tight, no wheezing. PFTs slight down from last visit.   2. ENT:  3. GI: Stools at baseline.   4. Eventually will need to go back to pulmonary rehab.       Derm?     Activity/rehab: Walking everyday for 5-10 minutes.   Oxygen needs: Room air  Pain management/RX: Denies  High risk donor: Yes  CMV status: D+/R+  DVT/PE: H/o DVT  Post op AFIB/follow up with EP: One time occurrence of a-fib  PJP prophylactic: Bactrim     COVID:  COVID-19 infection (yes/no, date of most recent positive test):   Status/instructions given about COVID-19 vaccine:      Pt Education: medications (use/dose/side effects), how/when to call coordinator, frequency of labs, s/s of infection/rejection, call prior to starting any new medications, lab/vital sign book     Health Maintenance:   Last colonoscopy: 7/25/23  Next colonoscopy due: 10 years   Dermatology:   Vaccinations this visit: None    Labs, CXR, PFTs reviewed with patient  Medication record reviewed and reconciled  Questions and concerns addressed    Patient Instructions  1. Continue to hydrate with 60-70 oz fluids daily.  2. Continue to exercise daily or most days of the week.  3. Follow up with your primary care provider for annual gender health maintenance procedures.  4. Follow up with colonoscopy schedule.  5. Follow up with annual dermatology visits.  6. It doesn't seem like the COVID vaccine is working well in lung transplant patients. A number of lung transplant patients have gotten sick  with COVID even after receiving the vaccines. Based on our recent experience, it can be life-threatening to get COVID  even after being vaccinated. Please continue to act like you did not get the COVID vaccine - social distancing, wearing a mask, good hand hygiene, etc. If the people around you are vaccinated, it will help reduce the risk of you getting COVID. All members of your household should be vaccinated.  7. Chest CT today. Based off CT scan results, we will then schedule you for a bronchoscopy.   8. Try using your Albuterol nebulizer to see if that helps with your chest tightness.   9. Increase your Azathioprine (Imuran) to 75mg daily.       Next transplant clinic appointment:  F/U to be determined with CXR, labs and PFTs  Next lab draw: pending tacrolimus level     AVS printed at time of check out        Again, thank you for allowing me to participate in the care of your patient.        Sincerely,        Ame Chow PA-C

## 2023-08-08 NOTE — PROGRESS NOTES
Transplant Coordinator Note    Reason for visit: Post lung transplant follow up visit   Coordinator: Present   Caregiver: Present     Health concerns addressed today:  1. Resp: Still feeling more SOB than usual baseline. Coughing slightly less compared to a few weeks ago, no as hard as a cough, dry. Feels like mucous sits in her throat, can't get it up. Feels like when she sneezes, everything opens back up again. Feels tight, no wheezing. PFTs slight down from last visit.   2. ENT:  3. GI: Stools at baseline.   4. Eventually will need to go back to pulmonary rehab.       Derm?     Activity/rehab: Walking everyday for 5-10 minutes.   Oxygen needs: Room air  Pain management/RX: Denies  High risk donor: Yes  CMV status: D+/R+  DVT/PE: H/o DVT  Post op AFIB/follow up with EP: One time occurrence of a-fib  PJP prophylactic: Bactrim     COVID:  COVID-19 infection (yes/no, date of most recent positive test):   Status/instructions given about COVID-19 vaccine:      Pt Education: medications (use/dose/side effects), how/when to call coordinator, frequency of labs, s/s of infection/rejection, call prior to starting any new medications, lab/vital sign book     Health Maintenance:   Last colonoscopy: 7/25/23  Next colonoscopy due: 10 years   Dermatology:   Vaccinations this visit: None    Labs, CXR, PFTs reviewed with patient  Medication record reviewed and reconciled  Questions and concerns addressed    Patient Instructions  1. Continue to hydrate with 60-70 oz fluids daily.  2. Continue to exercise daily or most days of the week.  3. Follow up with your primary care provider for annual gender health maintenance procedures.  4. Follow up with colonoscopy schedule.  5. Follow up with annual dermatology visits.  6. It doesn't seem like the COVID vaccine is working well in lung transplant patients. A number of lung transplant patients have gotten sick with COVID even after receiving the vaccines. Based on our recent experience,  it can be life-threatening to get COVID  even after being vaccinated. Please continue to act like you did not get the COVID vaccine - social distancing, wearing a mask, good hand hygiene, etc. If the people around you are vaccinated, it will help reduce the risk of you getting COVID. All members of your household should be vaccinated.  7. Chest CT today. Based off CT scan results, we will then schedule you for a bronchoscopy.   8. Try using your Albuterol nebulizer to see if that helps with your chest tightness.   9. Increase your Azathioprine (Imuran) to 75mg daily.       Next transplant clinic appointment:  F/U to be determined with CXR, labs and PFTs  Next lab draw: pending tacrolimus level     AVS printed at time of check out

## 2023-08-11 NOTE — RESULT ENCOUNTER NOTE
Per Dr. Garland (covering for Dr. Espinoza), posa level is therapeutic (therapeutic dose for posaconazole is >1,250 ng/mL).     Previous chest CT result forwarded to Dr. Bruno who is doing patient's bronch next week.

## 2023-08-16 NOTE — PROGRESS NOTES
Plan for pt to have bronch w/ BAL on 8/17.  Pre bronch signed/held orders in place.      Date of transplant: 3/1/2018   Transplant type: lung  Date of labs: 8/16/23 (listed in CareEverywhere)  BUN: 50.7 DDAVP: N/a (no biopsies)  Plt: 302  INR: 0.9 Anticoag therapy:N/a   Comment: Ame Chow suggested to lavage RLL per recent chest CT result from 8/8.

## 2023-08-16 NOTE — TELEPHONE ENCOUNTER
DATE:  8/16/2023     TIME OF RECEIPT FROM LAB:  9:20    ORDERING PROVIDER: cherie    LAB TEST:  creatinine    LAB VALUE:  7.3    RESULTS GIVEN WITH READ-BACK TO (PROVIDER):  Airam Balbuena    TIME LAB VALUE REPORTED TO PROVIDER:   9:20

## 2023-08-22 NOTE — LETTER
8/22/2023      RE: Kecia Blue  [address] [place] [address]       Dear Colleague,    Thank you for the opportunity to participate in the care of your patient, Kecia Blue, at the [facility] [place] at [facility]. Please see a copy of my visit note below.       SUBJECTIVE:  Kecia Blue is a 62 year old female who presents for evaluation to maintain renal transplant waitlist status.  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  Patient is on hemodialysis for over 3 years.    PMH most remarkable for ILD (status post bilateral lung transplant in 2018), ESRD (on dialysis ), history of surgical related DVT (not on anticoagulation anymore), and paroxysmal atrial fibrillation in the setting of acute illness   Currently patient is not very active but denied cardiac symptoms.  Overall she is doing well.  Patient Active Problem List    Diagnosis Date Noted    Chronic kidney disease, stage V (very severe) (H) 07/05/2023     Priority: Medium    Adverse effect of other drugs, medicaments and biological substances, sequela 07/05/2023     Priority: Medium    Current chronic use of systemic steroids 04/24/2023     Priority: Medium    Age-related osteoporosis without current pathological fracture 04/24/2023     Priority: Medium    ESRD (end stage renal disease) on dialysis (H) 04/24/2023     Priority: Medium    Immunosuppressed status (H) 05/26/2022     Priority: Medium    EBV (Franklin-Barr virus) viremia 04/06/2022     Priority: Medium    Pre-diabetes 04/06/2022     Priority: Medium    History of Pneumocystis jirovecii pneumonia 04/06/2022     Priority: Medium    Healthcare-associated pneumonia 09/20/2021     Priority: Medium    Lung transplant status, bilateral (H) 09/20/2021     Priority: Medium    Loculated pleural effusion 04/29/2021     Priority: Medium    Pneumonia due to Pneumocystis jirovecii (H) 02/21/2021      Priority: Medium    Episodic anisocoria 02/21/2021     Priority: Medium    End stage renal failure on dialysis (H)      Priority: Medium    Respiratory failure (H) 01/24/2021     Priority: Medium    Bronchial stenosis, right 01/24/2021     Priority: Medium     Added automatically from request for surgery [id number]      Aspergillus pneumonia (H) 11/20/2020     Priority: Medium    Hemodialysis patient (H) 08/10/2020     Priority: Medium     Patient has a tunneled catheter in place for hemodialysis  She has dialysis 3 times a week M-W-F in [place]      Shortness of breath 01/22/2020     Priority: Medium     Added automatically from request for surgery [id number]      Pulmonary hypertension (H) 01/22/2020     Priority: Medium     Added automatically from request for surgery [id number]      RADHA (acute kidney injury) (H) 10/21/2019     Priority: Medium    Empyema of lung (H) 09/13/2019     Priority: Medium    Administration of long-term prophylactic antibiotics 09/13/2019     Priority: Medium    Cholecystitis 09/12/2019     Priority: Medium    Low hemoglobin 12/13/2018     Priority: Medium    Nausea and vomiting 12/04/2018     Priority: Medium    Dehydration 08/01/2018     Priority: Medium    Acute kidney injury (H) 08/01/2018     Priority: Medium    Cytomegalovirus (CMV) viremia (H) 08/01/2018     Priority: Medium    Encounter for long-term (current) use of high-risk medication 04/03/2018     Priority: Medium    Deep vein thrombosis (DVT) (H) [I82.409] 03/30/2018     Priority: Medium    Steroid-induced hyperglycemia 03/11/2018     Priority: Medium    S/P lung transplant (H) 03/01/2018     Priority: Medium     (Note: This summary should only be edited by a member of the lung transplant team. Thanks!)      Transplant providers, see [facility] [place] for additional detailed information      Transplant Hospitalization Summary:  Bilateral Sequential Lung Transplant     Involved in a trial? (ex. INSPIRE, EXPAND):     Notable  Intra-Operative Information: None      DONOR Information:  CDC Increased Risk: Y/N?      PATIENT Information:  Serologies:     Donor Recipient Intervention   CMV status      EBV status      HSV status        Transplant Complications:   PRE-op (Y/N) POST-op (Y/N)    Trach      Vent support      Chest tube  n/a    ECMO   Decannulation date: &&&     Primary Graft Dysfunction Documentation:   POD#1   (0-24 hours) POD#2   (25-48 hours) POD#3   (49-72 hours)   Date of data      Time of data      Intubated? Y/N Y/N Y/N   PaO2      FiO2      P/F Ratio      PGD Grade   (0=mild, 3=severe)      ECMO? Y/N Y/N Y/N   Inhaled NO? Y/N Y/N Y/N   ISHLT PGD Scoring  Grade 0 - PaO2/FiO2 >300 and normal chest radiograph (must be extubated to be Grade 0)  Grade 1 - PaO2/FiO2 >300 and diffuse allograft infiltrates on chest radiograph  Grade 2 - PaO2/FiO2 between 200 and 300  Grade 3 - PaO2/FiO2 <200    Post-Op Data:  Complication Y/N Date Comments   Date of Extubation(s) N/A     Return to OR?      Reintubated?      Date Last Chest Tube Removed N/A     Rejection?      Afib?      Renal failure?      HCAP?      DVT/PE?      Native lung pathology results        Prednisone Taper Plan:  3/15/18 12.5 12.5   3/22/18 12 10   4/26/18 10 10   5/24/18 10 7.5   6/22/18 7.5 7.5   7/20/18 7.5 5   8/24/18 5 5   9/21/18 5 2.5       Discharge:  Discharge to: &&&Home / TCU / ARU  Discharge date: &&&      ILD (interstitial lung disease) (H) 02/27/2018     Priority: Medium    .  Current Outpatient Medications   Medication Sig    acetaminophen (TYLENOL) 325 MG tablet Take 1 tablet (325 mg) by mouth every 4 hours as needed for mild pain or fever    albuterol (PROVENTIL) (2.5 MG/3ML) 0.083% neb solution Take 1 vial (2.5 mg) by nebulization every 6 hours as needed for shortness of breath, wheezing or cough    azaTHIOprine (IMURAN) 50 MG tablet Take 1 1/2 tablets daily.    budesonide (PULMICORT) 0.5 MG/2ML neb solution Take 2 mLs (0.5 mg) by nebulization daily as  needed    calcium acetate (PHOSLO) 667 MG CAPS capsule Take 1 capsule (667 mg) by mouth 3 times daily (with meals) (largest meal)    Magnesium Cl-Calcium Carbonate (SLOW-MAG) 71.5-119 MG TBEC Take 2 tablets by mouth four times a week Take on non dialysis days T/Th/Sa/Su    metoprolol tartrate (LOPRESSOR) 25 MG tablet 1 tablet (25 mg) by Oral or Feeding Tube route 2 times daily    multivitamin RENAL (RENAVITE RX/NEPHROVITE) 1 tablet tablet Take 1 tablet by mouth daily    pantoprazole (PROTONIX) 40 MG EC tablet Take 1 tablet (40 mg) by mouth every morning (before breakfast)    posaconazole (NOXAFIL) 100 MG EC tablet Take 3 tablets (300 mg) by mouth daily    predniSONE (DELTASONE) 5 MG tablet Take 1 tablet (5 mg) by mouth every morning    sulfamethoxazole-trimethoprim (BACTRIM) 400-80 MG tablet Take 1 tablet by mouth Every Mon, Wed, Fri Morning    tacrolimus (GENERIC EQUIVALENT) 0.5 MG capsule Take 1 capsule (0.5 mg) by mouth every evening Total dose: 1 mg in AM and 0.5 mg in PM    tacrolimus (GENERIC EQUIVALENT) 1 MG capsule Take 1 capsule (1 mg) by mouth every morning Total dose: 1 mg in AM and 0.5 mg in PM     No current facility-administered medications for this visit.     Past Medical History:   Diagnosis Date    Acute on chronic respiratory failure with hypoxia (H) 02/21/2018    Anisocoria     Antisynthetase syndrome (H) 2014    Anxiety     Aspergillus (H)     Aspergillus pneumonia (H) 11/20/2020    Benign essential hypertension     C. difficile colitis     Cytomegalovirus (CMV) viremia (H)     Dermatomyositis (H)     Dysplasia of cervix, low grade (ESTRADA 1)     EBV (Franklin-Barr virus) viremia     ESRD (end stage renal disease) on dialysis (H)     ILD (interstitial lung disease) (H)     Lung replaced by transplant (H)     Osteopenia     Paroxysmal atrial fibrillation (H)     Pneumocystis jiroveci pneumonia (H)     PONV (postoperative nausea and vomiting)     Post-menopause     Pulmonary hypertension (H)      Raynaud's disease     Seronegative rheumatoid arthritis (H)      Past Surgical History:   Procedure Laterality Date    BRONCHOSCOPY (RIGID OR FLEXIBLE), DIAGNOSTIC N/A 4/10/2018    Procedure: COMBINED BRONCHOSCOPY (RIGID OR FLEXIBLE), LAVAGE;;  Surgeon: Mariposa Donohue MD;  Location: [facility]    BRONCHOSCOPY (RIGID OR FLEXIBLE), DIAGNOSTIC N/A 12/23/2020    Procedure: BRONCHOSCOPY, WITH BRONCHOALVEOLAR LAVAGE;  Surgeon: Uri Bass MD;  [facility]: [facility]    BRONCHOSCOPY (RIGID OR FLEXIBLE), DIAGNOSTIC N/A 5/26/2022    Procedure: BRONCHOSCOPY, WITH BRONCHOALVEOLAR LAVAGE;  Surgeon: Uri Bass MD;  Location: [facility]    BRONCHOSCOPY (RIGID OR FLEXIBLE), DIAGNOSTIC N/A 8/17/2023    Procedure: BRONCHOSCOPY, WITH BRONCHOALVEOLAR LAVAGE;  Surgeon: Esau Bruno MD;  Location: [facility]    BRONCHOSCOPY (RIGID OR FLEXIBLE), DILATE BRONCHUS / TRACHEA N/A 10/11/2018    Procedure: BRONCHOSCOPY (RIGID OR FLEXIBLE), DILATE BRONCHUS / TRACHEA;  Flexible And Rigid Bronchoscopy and Dilation;  Surgeon: Wilber Lin MD;  [facility]    BRONCHOSCOPY FLEXIBLE N/A 3/13/2018    Procedure: BRONCHOSCOPY FLEXIBLE;  Flexible Bronchoscopy ;  Surgeon: Gissell Sanchez MD;  [facility]: [facility]    BRONCHOSCOPY FLEXIBLE N/A 5/9/2018    Procedure: BRONCHOSCOPY FLEXIBLE;;  Surgeon: Wilber Lin MD;  Location: [facility]    BRONCHOSCOPY FLEXIBLE AND RIGID N/A 9/10/2018    Procedure: BRONCHOSCOPY FLEXIBLE AND RIGID;  Flexible and Rigid Bronchoscopy with Balloon Dilation, tissue debulking with cryo, and Right mainstem bronchus stent placement;  Surgeon: Wilber Lin MD;  [facility]    BRONCHOSCOPY RIGID N/A 6/6/2018    Procedure: BRONCHOSCOPY RIGID;;  Surgeon: Lopez Macias MD;  [facility]: [facility]    BRONCHOSCOPY, DILATE BRONCHUS, STENT BRONCHUS, COMBINED N/A 6/11/2018    Procedure: COMBINED BRONCHOSCOPY, DILATE BRONCHUS, STENT BRONCHUS;  Flexible Bronchoscopy, Balloon Dilation, Bronchial  Washings;  Surgeon: Wilber Lin MD;  [facility]    BRONCHOSCOPY, DILATE BRONCHUS, STENT BRONCHUS, COMBINED Right 7/10/2018    Procedure: COMBINED BRONCHOSCOPY, DILATE BRONCHUS, STENT BRONCHUS;  Flexible Bronchoscopy, Balloon Dilation, Bronchial Washings  ;  Surgeon: Wilber Lin MD;  [facility]    BRONCHOSCOPY, DILATE BRONCHUS, STENT BRONCHUS, COMBINED N/A 8/2/2018    Procedure: COMBINED BRONCHOSCOPY, DILATE BRONCHUS, STENT BRONCHUS;  Flexible Bronchoscopy, Bronchial Washings, Balloon Dilation;  Surgeon: Wilber Lin MD;  [facility]    BRONCHOSCOPY, DILATE BRONCHUS, STENT BRONCHUS, COMBINED N/A 8/20/2018    Procedure: COMBINED BRONCHOSCOPY, DILATE BRONCHUS, STENT BRONCHUS;  Flexible Bronchoscopy, Balloon Dilation;  Surgeon: Wilber Lin MD;  [facility]    BRONCHOSCOPY, DILATE BRONCHUS, STENT BRONCHUS, COMBINED N/A 10/29/2018    Procedure: Flexible Bronchoscopy, Balloon Dilation, Stent Revision, Airway Examination And Therapeutic Suctioning, Cyro Tumor Debulking;  Surgeon: Wilber Lin MD;  [facility]    BRONCHOSCOPY, DILATE BRONCHUS, STENT BRONCHUS, COMBINED N/A 11/20/2018    Procedure: Rigid Bronchoscopy, Stent Removal and dilitation;  Surgeon: Wilber Lin MD;  [facility]    BRONCHOSCOPY, DILATE BRONCHUS, STENT BRONCHUS, COMBINED N/A 12/14/2018    Procedure: Flexible And Rigid Bronchoscopy, Balloon Dilation, Bronchial Washing;  Surgeon: Wilber Lin MD;  [facility]    BRONCHOSCOPY, DILATE BRONCHUS, STENT BRONCHUS, COMBINED N/A 1/17/2019    Procedure: Flexible And Rigid Bronchoscopy, Balloon Dilation.;  Surgeon: Wilber Lin MD;  [facility]    BRONCHOSCOPY, DILATE BRONCHUS, STENT BRONCHUS, COMBINED N/A 3/7/2019    Procedure: Flexible and Rigid Bronchoscopy, Bronchial Washing, Balloon Dilation;  Surgeon: Wilber Lin MD;  [facility]    BRONCHOSCOPY, DILATE BRONCHUS, STENT BRONCHUS,  COMBINED N/A 6/6/2019    Procedure: Rigid and Flexible Bronchoscopy, Balloon Dilation;  Surgeon: Wilber Lin MD;  [facility]    BRONCHOSCOPY, DILATE BRONCHUS, STENT BRONCHUS, COMBINED N/A 10/11/2019    Procedure: Flexible and Rigid Bronchoscopy, Balloon Dilation, Bronchoalveolar Lagave;  Surgeon: Wilber Lin MD;  [facility]    BRONCHOSCOPY, DILATE BRONCHUS, STENT BRONCHUS, COMBINED N/A 2/19/2021    Procedure: BRONCHOSCOPY, flexible, airway dilation, and bronchial wash;  Surgeon: Wilber Lin MD;  [facility]    BRONCHOSCOPY, DILATE BRONCHUS, STENT BRONCHUS, COMBINED N/A 4/9/2021    Procedure: BRONCHOSCOPY, flexible and rigid, Airway suctioning;  Surgeon: Mati Norris MD;  [facility]    CV RIGHT HEART CATH MEASUREMENTS RECORDED N/A 3/10/2020    Procedure: CV RIGHT HEART CATH;  Surgeon: Wai Garcia MD;  [facility]    ENT SURGERY      tonsillectomy as a child    ESOPHAGOSCOPY, GASTROSCOPY, DUODENOSCOPY (EGD), COMBINED N/A 10/29/2018    Procedure: COMBINED ESOPHAGOSCOPY, GASTROSCOPY, DUODENOSCOPY (EGD) with biopsies and polypectomy;  Surgeon: Chente Bloom MD;  [facility]    INSERT EXTRACORPORAL MEMBRANE OXYGENATOR ADULT (OUTSIDE OR) N/A 2/27/2018    Procedure: INSERT EXTRACORPORAL MEMBRANE OXYGENATOR ADULT (OUTSIDE OR);  INSERT EXTRACORPORAL MEMBRANE OXYGENATOR ADULT (OUTSIDE OR) ;  Surgeon: Toby Hernandez MD;  [facility]    IR CVC TUNNEL PLACEMENT > 5 YRS OF AGE  10/25/2019    IR DIALYSIS FISTULOGRAM LEFT  3/2/2021    IR DIALYSIS MECH THROMB, PTA  3/2/2021    IR FLUORO 0-1 HOUR  5/7/2021    IR GASTRO JEJUNOSTOMY TUBE PLACEMENT  2/16/2021    IR PARACENTESIS  1/8/2020    IR THORACENTESIS  9/13/2019    IR TRANSCATHETER BIOPSY  1/8/2020    LASER CO2 BRONCHOSCOPY N/A 4/30/2021    Procedure: Flexible and Rigid Bronchoscopy and Tissue Debulking with CO2 Laser Assistance;  Surgeon: Mati Norris MD;   [facility]    LASER CO2 BRONCHOSCOPY N/A 2021    Procedure: BRONCHOSCOPY, Flexible and Rigid Bronchoscopy, Tissue Debulking with cryo Assistance, airway dilation,;  Surgeon: Mati Norris MD;  [facility]    LASER CO2 BRONCHOSCOPY N/A 2021    Procedure: BRONCHOSCOPY, flexible and rigid, CO2 Laser Debulking;  Surgeon: Mati Norris MD;  [facility]    LASER CO2 BRONCHOSCOPY N/A 2022    Procedure: flexible, rigid bronchoscopy, tissue debulking, airway dilation, co2 laser, bronchoalveolar lavage;  Surgeon: Juana Baugh MD;  [facility]    no prior surgery      REMOVE EXTRACORPORAL MEMBRANE OXYGENATOR ADULT N/A 3/3/2018    Procedure: REMOVE EXTRACORPORAL MEMBRANE OXYGENATOR ADULT;  Removal of Right Femoral Venous and Right Axillary Arterial Extracorporeal Membrane Oxygenator;  Surgeon: Toby Hernandez MD;  [facility]    TRANSPLANT LUNG RECIPIENT SINGLE X2 Bilateral 3/1/2018    Procedure: TRANSPLANT LUNG RECIPIENT SINGLE X2;  Median Sternotomy, Extracorporeal Membrane Oxygenator, bilateral sequential lung transplant;  Surgeon: Toby Hernandez MD;  [facility]     No Known Allergies  Social History     Socioeconomic History    Marital status:      Spouse name: Not on file    Number of children: Not on file    Years of education: Not on file    Highest education level: Not on file   Occupational History    Not on file   Tobacco Use    Smoking status: Never    Smokeless tobacco: Never   Substance and Sexual Activity    Alcohol use: No     Alcohol/week: 1.0 standard drink of alcohol     Types: 1 Glasses of wine per week    Drug use: No    Sexual activity: Not on file   Other Topics Concern    Parent/sibling w/ CABG, MI or angioplasty before 65F 55M? No   Social History Narrative    3/6/2019 - Lives with . Has three daughters. Four grandchildren (two ). No pets. Travelled previously to [place], [place]. Has visited [place] several  "times.      Social Determinants of Health     Financial Resource Strain: Not on file   Food Insecurity: Not on file   Transportation Needs: Not on file   Physical Activity: Not on file   Stress: Not on file   Social Connections: Not on file   Intimate Partner Violence: Not on file   Housing Stability: Not on file     Family History   Problem Relation Age of Onset    Hypertension Mother     Arthritis Mother     Pancreatic Cancer Father     Diabetes Father           REVIEW OF SYSTEMS:  General: negative, fever, chills, night sweats  Skin: negative, acne, rash, and scaling  Eyes: negative, double vision, eye pain, and photophobia  Ears/Nose/Throat: negative, nasal congestion, and purulent rhinorrhea  Respiratory: No dyspnea on exertion, No cough, No hemoptysis, and negative  Cardiovascular: negative, palpitations, tachycardia, irregular heart beat, chest pain, exertional chest pain or pressure, paroxysmal nocturnal dyspnea, dyspnea on exertion, and orthopnea       OBJECTIVE:  Blood pressure (!) 144/89, pulse 78, height 1.6 m (5' 2.99\"), weight 60.7 kg (133 lb 14.4 oz), last menstrual period 06/07/2014, SpO2 94 %, not currently breastfeeding.  General Appearance: alert and no distress  Head: Normocephalic. No masses, lesions, tenderness or abnormalities  Eyes: conjuctiva clear, PERRL, EOM intact  Ears: External ears normal. Canals clear. TM's normal.  Nose: Nares normal  Mouth: normal  Neck: Supple, no cervical adenopathy, no thyromegaly  Lungs: clear to auscultation  Cardiac: regular rate and rhythm, normal S1 and S2, no murmur       ASSESSMENT/PLAN:  Patient here for evaluation to maintain renal transplant waitlist status.  CKD stage V on hemodialysis due to calcineurin inhibitor toxicity.  On dialysis for past 3 years.  Patient is status post bilateral lung transplant in 2018.  Currently patient is not very active but denied cardiac symptoms.  Her only cardiac medication is Lopressor 25 mg twice a day.  As part of " lung transplant evaluation patient had a coronary angiogram in 2018.  This showed no significant coronary artery disease.  Patient's EKG shows normal sinus rhythm LVH with strain.  Echocardiogram shows normal biventricular function and no significant valvular abnormalities.  Patient is scheduled for a Lexiscan today.  If this is normal she will be able to proceed with the transplant.  Total visit duration 30 minutes.  This includes face-to-face interview, physical exam, chart review, review of EKG echocardiogram coronary angiogram and documentation.      Please do not hesitate to contact me if you have any questions/concerns.     Sincerely,     BAILEE Charles MD

## 2023-08-22 NOTE — PROGRESS NOTES
Pt here for Lexiscan nuclear stress test. Medication and side effects reviewed with patient. Lung sounds clear to auscultation bilaterally. Denied caffeine use. Patient tolerated Lexiscan dose without any adverse reactions. VSS. Monitored post injection and then taken to  nuclear medicine for follow up imaging.

## 2023-08-22 NOTE — NURSING NOTE
Chief Complaint   Patient presents with    Follow Up     follow-up ; most remarkable for ILD (status post bilateral lung transplant in 2018), ESRD (on dialysis TID), history of surgical related DVT (not on anticoagulation anymore), and paroxysmal atrial fibrillation in the setting of acute illness.       Vitals were taken, medications reconciled.    Paige Thurner, Facilitator   9:13 AM

## 2023-08-22 NOTE — PROGRESS NOTES
SUBJECTIVE:  Kecia Blue is a 62 year old female who presents for evaluation to maintain renal transplant waitlist status.  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  Patient is on hemodialysis for over 3 years.    PMH most remarkable for ILD (status post bilateral lung transplant in 2018), ESRD (on dialysis ), history of surgical related DVT (not on anticoagulation anymore), and paroxysmal atrial fibrillation in the setting of acute illness   Currently patient is not very active but denied cardiac symptoms.  Overall she is doing well.  Patient Active Problem List    Diagnosis Date Noted    Chronic kidney disease, stage V (very severe) (H) 07/05/2023     Priority: Medium    Adverse effect of other drugs, medicaments and biological substances, sequela 07/05/2023     Priority: Medium    Current chronic use of systemic steroids 04/24/2023     Priority: Medium    Age-related osteoporosis without current pathological fracture 04/24/2023     Priority: Medium    ESRD (end stage renal disease) on dialysis (H) 04/24/2023     Priority: Medium    Immunosuppressed status (H) 05/26/2022     Priority: Medium    EBV (Franklin-Barr virus) viremia 04/06/2022     Priority: Medium    Pre-diabetes 04/06/2022     Priority: Medium    History of Pneumocystis jirovecii pneumonia 04/06/2022     Priority: Medium    Healthcare-associated pneumonia 09/20/2021     Priority: Medium    Lung transplant status, bilateral (H) 09/20/2021     Priority: Medium    Loculated pleural effusion 04/29/2021     Priority: Medium    Pneumonia due to Pneumocystis jirovecii (H) 02/21/2021     Priority: Medium    Episodic anisocoria 02/21/2021     Priority: Medium    End stage renal failure on dialysis (H)      Priority: Medium    Respiratory failure (H) 01/24/2021     Priority: Medium    Bronchial stenosis, right 01/24/2021     Priority: Medium     Added automatically from request for surgery [id number]      Aspergillus pneumonia (H) 11/20/2020      Priority: Medium    Hemodialysis patient (H) 08/10/2020     Priority: Medium     Patient has a tunneled catheter in place for hemodialysis  She has dialysis 3 times a week M-W-F in [place]      Shortness of breath 01/22/2020     Priority: Medium     Added automatically from request for surgery [id number]      Pulmonary hypertension (H) 01/22/2020     Priority: Medium     Added automatically from request for surgery [id number]      RADHA (acute kidney injury) (H) 10/21/2019     Priority: Medium    Empyema of lung (H) 09/13/2019     Priority: Medium    Administration of long-term prophylactic antibiotics 09/13/2019     Priority: Medium    Cholecystitis 09/12/2019     Priority: Medium    Low hemoglobin 12/13/2018     Priority: Medium    Nausea and vomiting 12/04/2018     Priority: Medium    Dehydration 08/01/2018     Priority: Medium    Acute kidney injury (H) 08/01/2018     Priority: Medium    Cytomegalovirus (CMV) viremia (H) 08/01/2018     Priority: Medium    Encounter for long-term (current) use of high-risk medication 04/03/2018     Priority: Medium    Deep vein thrombosis (DVT) (H) [I82.409] 03/30/2018     Priority: Medium    Steroid-induced hyperglycemia 03/11/2018     Priority: Medium    S/P lung transplant (H) 03/01/2018     Priority: Medium     (Note: This summary should only be edited by a member of the lung transplant team. Thanks!)      Transplant providers, see [facility] for additional detailed information      Transplant Hospitalization Summary:  Bilateral Sequential Lung Transplant     Involved in a trial? (ex. INSPIRE, EXPAND):     Notable Intra-Operative Information: None      DONOR Information:  CDC Increased Risk: Y/N?      PATIENT Information:  Serologies:     Donor Recipient Intervention   CMV status      EBV status      HSV status        Transplant Complications:   PRE-op (Y/N) POST-op (Y/N)    Trach      Vent support      Chest tube  n/a    ECMO   Decannulation date: &&&     Primary Graft  Dysfunction Documentation:   POD#1   (0-24 hours) POD#2   (25-48 hours) POD#3   (49-72 hours)   Date of data      Time of data      Intubated? Y/N Y/N Y/N   PaO2      FiO2      P/F Ratio      PGD Grade   (0=mild, 3=severe)      ECMO? Y/N Y/N Y/N   Inhaled NO? Y/N Y/N Y/N   ISHLT PGD Scoring  Grade 0 - PaO2/FiO2 >300 and normal chest radiograph (must be extubated to be Grade 0)  Grade 1 - PaO2/FiO2 >300 and diffuse allograft infiltrates on chest radiograph  Grade 2 - PaO2/FiO2 between 200 and 300  Grade 3 - PaO2/FiO2 <200    Post-Op Data:  Complication Y/N Date Comments   Date of Extubation(s) N/A     Return to OR?      Reintubated?      Date Last Chest Tube Removed N/A     Rejection?      Afib?      Renal failure?      HCAP?      DVT/PE?      Native lung pathology results        Prednisone Taper Plan:  3/15/18 12.5 12.5   3/22/18 12 10   4/26/18 10 10   5/24/18 10 7.5   6/22/18 7.5 7.5   7/20/18 7.5 5   8/24/18 5 5   9/21/18 5 2.5       Discharge:  Discharge to: &&&Home / TCU / ARU  Discharge date: &&&      ILD (interstitial lung disease) (H) 02/27/2018     Priority: Medium    .  Current Outpatient Medications   Medication Sig    acetaminophen (TYLENOL) 325 MG tablet Take 1 tablet (325 mg) by mouth every 4 hours as needed for mild pain or fever    albuterol (PROVENTIL) (2.5 MG/3ML) 0.083% neb solution Take 1 vial (2.5 mg) by nebulization every 6 hours as needed for shortness of breath, wheezing or cough    azaTHIOprine (IMURAN) 50 MG tablet Take 1 1/2 tablets daily.    budesonide (PULMICORT) 0.5 MG/2ML neb solution Take 2 mLs (0.5 mg) by nebulization daily as needed    calcium acetate (PHOSLO) 667 MG CAPS capsule Take 1 capsule (667 mg) by mouth 3 times daily (with meals) (largest meal)    Magnesium Cl-Calcium Carbonate (SLOW-MAG) 71.5-119 MG TBEC Take 2 tablets by mouth four times a week Take on non dialysis days T/Th/Sa/Su    metoprolol tartrate (LOPRESSOR) 25 MG tablet 1 tablet (25 mg) by Oral or Feeding Tube  route 2 times daily    multivitamin RENAL (RENAVITE RX/NEPHROVITE) 1 tablet tablet Take 1 tablet by mouth daily    pantoprazole (PROTONIX) 40 MG EC tablet Take 1 tablet (40 mg) by mouth every morning (before breakfast)    posaconazole (NOXAFIL) 100 MG EC tablet Take 3 tablets (300 mg) by mouth daily    predniSONE (DELTASONE) 5 MG tablet Take 1 tablet (5 mg) by mouth every morning    sulfamethoxazole-trimethoprim (BACTRIM) 400-80 MG tablet Take 1 tablet by mouth Every Mon, Wed, Fri Morning    tacrolimus (GENERIC EQUIVALENT) 0.5 MG capsule Take 1 capsule (0.5 mg) by mouth every evening Total dose: 1 mg in AM and 0.5 mg in PM    tacrolimus (GENERIC EQUIVALENT) 1 MG capsule Take 1 capsule (1 mg) by mouth every morning Total dose: 1 mg in AM and 0.5 mg in PM     No current facility-administered medications for this visit.     Past Medical History:   Diagnosis Date    Acute on chronic respiratory failure with hypoxia (H) 02/21/2018    Anisocoria     Antisynthetase syndrome (H) 2014    Anxiety     Aspergillus (H)     Aspergillus pneumonia (H) 11/20/2020    Benign essential hypertension     C. difficile colitis     Cytomegalovirus (CMV) viremia (H)     Dermatomyositis (H)     Dysplasia of cervix, low grade (ESTRADA 1)     EBV (Franklin-Barr virus) viremia     ESRD (end stage renal disease) on dialysis (H)     ILD (interstitial lung disease) (H)     Lung replaced by transplant (H)     Osteopenia     Paroxysmal atrial fibrillation (H)     Pneumocystis jiroveci pneumonia (H)     PONV (postoperative nausea and vomiting)     Post-menopause     Pulmonary hypertension (H)     Raynaud's disease     Seronegative rheumatoid arthritis (H)      Past Surgical History:   Procedure Laterality Date    BRONCHOSCOPY (RIGID OR FLEXIBLE), DIAGNOSTIC N/A 4/10/2018    Procedure: COMBINED BRONCHOSCOPY (RIGID OR FLEXIBLE), LAVAGE;;  Surgeon: Mariposa Donohue MD;  Location:  GI    BRONCHOSCOPY (RIGID OR FLEXIBLE), DIAGNOSTIC N/A 12/23/2020     Procedure: BRONCHOSCOPY, WITH BRONCHOALVEOLAR LAVAGE;  Surgeon: Uri Bass MD;  [facility]    BRONCHOSCOPY (RIGID OR FLEXIBLE), DIAGNOSTIC N/A 5/26/2022    Procedure: BRONCHOSCOPY, WITH BRONCHOALVEOLAR LAVAGE;  Surgeon: Uri Bass MD;  [facility]    BRONCHOSCOPY (RIGID OR FLEXIBLE), DIAGNOSTIC N/A 8/17/2023    Procedure: BRONCHOSCOPY, WITH BRONCHOALVEOLAR LAVAGE;  Surgeon: Esau Bruno MD;  [facility]    BRONCHOSCOPY (RIGID OR FLEXIBLE), DILATE BRONCHUS / TRACHEA N/A 10/11/2018    Procedure: BRONCHOSCOPY (RIGID OR FLEXIBLE), DILATE BRONCHUS / TRACHEA;  Flexible And Rigid Bronchoscopy and Dilation;  Surgeon: Wilber Lin MD;  [facility]    BRONCHOSCOPY FLEXIBLE N/A 3/13/2018    Procedure: BRONCHOSCOPY FLEXIBLE;  Flexible Bronchoscopy ;  Surgeon: Gissell Sanchez MD;  [facility]    BRONCHOSCOPY FLEXIBLE N/A 5/9/2018    Procedure: BRONCHOSCOPY FLEXIBLE;;  Surgeon: Wilber Lin MD;  [facility]    BRONCHOSCOPY FLEXIBLE AND RIGID N/A 9/10/2018    Procedure: BRONCHOSCOPY FLEXIBLE AND RIGID;  Flexible and Rigid Bronchoscopy with Balloon Dilation, tissue debulking with cryo, and Right mainstem bronchus stent placement;  Surgeon: Wilber Lin MD;  [facility]    BRONCHOSCOPY RIGID N/A 6/6/2018    Procedure: BRONCHOSCOPY RIGID;;  Surgeon: Lopez Macias MD;  [facility]    BRONCHOSCOPY, DILATE BRONCHUS, STENT BRONCHUS, COMBINED N/A 6/11/2018    Procedure: COMBINED BRONCHOSCOPY, DILATE BRONCHUS, STENT BRONCHUS;  Flexible Bronchoscopy, Balloon Dilation, Bronchial Washings;  Surgeon: Wilber Lin MD;  [facility]    BRONCHOSCOPY, DILATE BRONCHUS, STENT BRONCHUS, COMBINED Right 7/10/2018    Procedure: COMBINED BRONCHOSCOPY, DILATE BRONCHUS, STENT BRONCHUS;  Flexible Bronchoscopy, Balloon Dilation, Bronchial Washings  ;  Surgeon: Wilber Lin MD;  [facility]    BRONCHOSCOPY, DILATE BRONCHUS, STENT  BRONCHUS, COMBINED N/A 8/2/2018    Procedure: COMBINED BRONCHOSCOPY, DILATE BRONCHUS, STENT BRONCHUS;  Flexible Bronchoscopy, Bronchial Washings, Balloon Dilation;  Surgeon: Wilber Lin MD;  [facility]    BRONCHOSCOPY, DILATE BRONCHUS, STENT BRONCHUS, COMBINED N/A 8/20/2018    Procedure: COMBINED BRONCHOSCOPY, DILATE BRONCHUS, STENT BRONCHUS;  Flexible Bronchoscopy, Balloon Dilation;  Surgeon: Wilber Lin MD;  [facility]    BRONCHOSCOPY, DILATE BRONCHUS, STENT BRONCHUS, COMBINED N/A 10/29/2018    Procedure: Flexible Bronchoscopy, Balloon Dilation, Stent Revision, Airway Examination And Therapeutic Suctioning, Cyro Tumor Debulking;  Surgeon: Wilber Lin MD;  [facility]    BRONCHOSCOPY, DILATE BRONCHUS, STENT BRONCHUS, COMBINED N/A 11/20/2018    Procedure: Rigid Bronchoscopy, Stent Removal and dilitation;  Surgeon: Wilber Lin MD;  [facility]    BRONCHOSCOPY, DILATE BRONCHUS, STENT BRONCHUS, COMBINED N/A 12/14/2018    Procedure: Flexible And Rigid Bronchoscopy, Balloon Dilation, Bronchial Washing;  Surgeon: Wilber Lin MD;  [facility]    BRONCHOSCOPY, DILATE BRONCHUS, STENT BRONCHUS, COMBINED N/A 1/17/2019    Procedure: Flexible And Rigid Bronchoscopy, Balloon Dilation.;  Surgeon: Wilber Lin MD;  [facility]    BRONCHOSCOPY, DILATE BRONCHUS, STENT BRONCHUS, COMBINED N/A 3/7/2019    Procedure: Flexible and Rigid Bronchoscopy, Bronchial Washing, Balloon Dilation;  Surgeon: Wilber Lin MD;  [facility]    BRONCHOSCOPY, DILATE BRONCHUS, STENT BRONCHUS, COMBINED N/A 6/6/2019    Procedure: Rigid and Flexible Bronchoscopy, Balloon Dilation;  Surgeon: Wilber Lin MD;  [facility]    BRONCHOSCOPY, DILATE BRONCHUS, STENT BRONCHUS, COMBINED N/A 10/11/2019    Procedure: Flexible and Rigid Bronchoscopy, Balloon Dilation, Bronchoalveolar Lagave;  Surgeon: Wilber Lin MD;  [facility]     BRONCHOSCOPY, DILATE BRONCHUS, STENT BRONCHUS, COMBINED N/A 2/19/2021    Procedure: BRONCHOSCOPY, flexible, airway dilation, and bronchial wash;  Surgeon: Wilber Lin MD;  [facility]    BRONCHOSCOPY, DILATE BRONCHUS, STENT BRONCHUS, COMBINED N/A 4/9/2021    Procedure: BRONCHOSCOPY, flexible and rigid, Airway suctioning;  Surgeon: Mati Norris MD;  [facility]    CV RIGHT HEART CATH MEASUREMENTS RECORDED N/A 3/10/2020    Procedure: CV RIGHT HEART CATH;  Surgeon: Wai Garcia MD;  [facility]    ENT SURGERY      tonsillectomy as a child    ESOPHAGOSCOPY, GASTROSCOPY, DUODENOSCOPY (EGD), COMBINED N/A 10/29/2018    Procedure: COMBINED ESOPHAGOSCOPY, GASTROSCOPY, DUODENOSCOPY (EGD) with biopsies and polypectomy;  Surgeon: Chente Bloom MD;  [facility]    INSERT EXTRACORPORAL MEMBRANE OXYGENATOR ADULT (OUTSIDE OR) N/A 2/27/2018    Procedure: INSERT EXTRACORPORAL MEMBRANE OXYGENATOR ADULT (OUTSIDE OR);  INSERT EXTRACORPORAL MEMBRANE OXYGENATOR ADULT (OUTSIDE OR) ;  Surgeon: Toby Hernandez MD;  [facility]    IR CVC TUNNEL PLACEMENT > 5 YRS OF AGE  10/25/2019    IR DIALYSIS FISTULOGRAM LEFT  3/2/2021    IR DIALYSIS MECH THROMB, PTA  3/2/2021    IR FLUORO 0-1 HOUR  5/7/2021    IR GASTRO JEJUNOSTOMY TUBE PLACEMENT  2/16/2021    IR PARACENTESIS  1/8/2020    IR THORACENTESIS  9/13/2019    IR TRANSCATHETER BIOPSY  1/8/2020    LASER CO2 BRONCHOSCOPY N/A 4/30/2021    Procedure: Flexible and Rigid Bronchoscopy and Tissue Debulking with CO2 Laser Assistance;  Surgeon: Mati Norris MD;  [facility]    LASER CO2 BRONCHOSCOPY N/A 6/11/2021    Procedure: BRONCHOSCOPY, Flexible and Rigid Bronchoscopy, Tissue Debulking with cryo Assistance, airway dilation,;  Surgeon: Mati Norris MD;  [facility]    LASER CO2 BRONCHOSCOPY N/A 9/16/2021    Procedure: BRONCHOSCOPY, flexible and rigid, CO2 Laser Debulking;  Surgeon: Mati Norris MD;   [facility]    LASER CO2 BRONCHOSCOPY N/A 2022    Procedure: flexible, rigid bronchoscopy, tissue debulking, airway dilation, co2 laser, bronchoalveolar lavage;  Surgeon: Juana Baugh MD;  [facility]    no prior surgery      REMOVE EXTRACORPORAL MEMBRANE OXYGENATOR ADULT N/A 3/3/2018    Procedure: REMOVE EXTRACORPORAL MEMBRANE OXYGENATOR ADULT;  Removal of Right Femoral Venous and Right Axillary Arterial Extracorporeal Membrane Oxygenator;  Surgeon: Toby Hernandez MD;  [facility]    TRANSPLANT LUNG RECIPIENT SINGLE X2 Bilateral 3/1/2018    Procedure: TRANSPLANT LUNG RECIPIENT SINGLE X2;  Median Sternotomy, Extracorporeal Membrane Oxygenator, bilateral sequential lung transplant;  Surgeon: Toby Hernandez MD;  [facility]     No Known Allergies  Social History     Socioeconomic History    Marital status:      Spouse name: Not on file    Number of children: Not on file    Years of education: Not on file    Highest education level: Not on file   Occupational History    Not on file   Tobacco Use    Smoking status: Never    Smokeless tobacco: Never   Substance and Sexual Activity    Alcohol use: No     Alcohol/week: 1.0 standard drink of alcohol     Types: 1 Glasses of wine per week    Drug use: No    Sexual activity: Not on file   Other Topics Concern    Parent/sibling w/ CABG, MI or angioplasty before 65F 55M? No   Social History Narrative    3/6/2019 - Lives with . Has three daughters. Four grandchildren (two ). No pets. Travelled previously to [facility]. Has visited [place] several times.      Social Determinants of Health     Financial Resource Strain: Not on file   Food Insecurity: Not on file   Transportation Needs: Not on file   Physical Activity: Not on file   Stress: Not on file   Social Connections: Not on file   Intimate Partner Violence: Not on file   Housing Stability: Not on file     Family History   Problem Relation Age of Onset  "   Hypertension Mother     Arthritis Mother     Pancreatic Cancer Father     Diabetes Father           REVIEW OF SYSTEMS:  General: negative, fever, chills, night sweats  Skin: negative, acne, rash, and scaling  Eyes: negative, double vision, eye pain, and photophobia  Ears/Nose/Throat: negative, nasal congestion, and purulent rhinorrhea  Respiratory: No dyspnea on exertion, No cough, No hemoptysis, and negative  Cardiovascular: negative, palpitations, tachycardia, irregular heart beat, chest pain, exertional chest pain or pressure, paroxysmal nocturnal dyspnea, dyspnea on exertion, and orthopnea       OBJECTIVE:  Blood pressure (!) 144/89, pulse 78, height 1.6 m (5' 2.99\"), weight 60.7 kg (133 lb 14.4 oz), last menstrual period 06/07/2014, SpO2 94 %, not currently breastfeeding.  General Appearance: alert and no distress  Head: Normocephalic. No masses, lesions, tenderness or abnormalities  Eyes: conjuctiva clear, PERRL, EOM intact  Ears: External ears normal. Canals clear. TM's normal.  Nose: Nares normal  Mouth: normal  Neck: Supple, no cervical adenopathy, no thyromegaly  Lungs: clear to auscultation  Cardiac: regular rate and rhythm, normal S1 and S2, no murmur       ASSESSMENT/PLAN:  Patient here for evaluation to maintain renal transplant waitlist status.  CKD stage V on hemodialysis due to calcineurin inhibitor toxicity.  On dialysis for past 3 years.  Patient is status post bilateral lung transplant in 2018.  Currently patient is not very active but denied cardiac symptoms.  Her only cardiac medication is Lopressor 25 mg twice a day.  As part of lung transplant evaluation patient had a coronary angiogram in 2018.  This showed no significant coronary artery disease.  Patient's EKG shows normal sinus rhythm LVH with strain.  Echocardiogram shows normal biventricular function and no significant valvular abnormalities.  Patient is scheduled for a Lexiscan today.  If this is normal she will be able to proceed " with the transplant.  Total visit duration 30 minutes.  This includes face-to-face interview, physical exam, chart review, review of EKG echocardiogram coronary angiogram and documentation.     Stress test reviewed.  No perfusion defect.  Normal LV function.  Patient cleared for transplant.

## 2023-08-22 NOTE — PATIENT INSTRUCTIONS
August 22, 2023    Cardiology Provider You Saw During Your Visit: Dr. Ochoa      Medication Changes: None      Follow Up: No follow-up indicated        Follow the American Heart Association Diet and Lifestyle Recommendations:  -Limit saturated fat, trans fat, sodium, red meat, sweets and sugar-sweetened beverages. If you choose to eat red meat, compare labels and select the leanest cuts available.  -Aim for at least 150 minutes of moderate physical activity or 75 minutes of vigorous physical activity - or an equal combination of both - each week.      To Reach Us:  -During business hours: [phone], press option # 1 to schedule an appointment or to leave a message for your care team.     -After hours, weekends or holidays: [phone], press option #4 and ask to speak to the on-call cardiologist. Inform them you are a patient at the [facility].        **If you have a cardiac device, please make sure you schedule an in-person device check just prior to your cardiology provider appointments**        Antoinette Mendieta RN  Cardiology Care Coordinator - General Cardiology  [facility]

## 2023-08-22 NOTE — TELEPHONE ENCOUNTER
Estimated Creatinine Clearance: 8.6 mL/min (A) (based on SCr of 6.35 mg/dL (H)).    Bronch from 8/17/23 growing stenotrophomonas maltophilia. Reviewed with Dr. Christensen and plan to treat with Bactrim for 2 weeks. Dose is 1 single strength tab BID since CrCl <15 (reviewed with pharmacist and confirmed dose okay with Dr. Christensen). Pt will hold usual prophylactic bactrim dose while on this treatment dose.     Will notify Ame about this, bronch report, and inquire if patient still needs to come to clinic next week or should delay until antibiotic course is complete.

## 2023-08-22 NOTE — NURSING NOTE
Frailty Assessment Note:     Overall Score 3/5    Weight:   Wt Readings from Last 3 Encounters:   08/22/23 60.2 kg (132 lb 11.2 oz)   08/22/23 60.7 kg (133 lb 14.4 oz)   09/27/22 58.1 kg (128 lb)     Height: 161.3 cm    Weight lost over 10lbs in last year: No    Reported Exhaustion/Energy Levels: Mild    Slowness: 6.84 seconds / 15 feet walking (average of 2 attempts)    Low Activity Level Score: 1      Strength: 16 (average of 3 trials on dominant hand)      MADHU RYAN, RN on 8/22/2023 at 10:27 AM

## 2023-08-23 NOTE — PROGRESS NOTES
"4C PT Eval     01/25/21 1300   Quick Adds   Type of Visit Initial PT Evaluation   Living Environment   People in home spouse   Current Living Arrangements house   Home Accessibility no concerns   Transportation Anticipated family or friend will provide;car, drives self   Living Environment Comments Lives in house with spouse. Home has ramp enterance and patient is able to stay on main level. Spouse reports patient has been frequently sick since lung transplant ~3 years ago and with limited ability to participate in therapy on OP basis.    Self-Care   Usual Activity Tolerance moderate   Current Activity Tolerance fair   Regular Exercise No   Equipment Currently Used at Home none   Activity/Exercise/Self-Care Comment Spouse reports that patient is unable to navigate stairs at baseline and can walk to/from garage (~50 feet) prior to noting fatigue. In EMR patient with recent 6MWT (~1.5 moths ago) with 650' ambulatory distance.    Disability/Function   Hearing Difficulty or Deaf no   Wear Glasses or Blind yes   Vision Management glasses   Concentrating, Remembering or Making Decisions Difficulty no   Difficulty Communicating no   Difficulty Eating/Swallowing no   Walking or Climbing Stairs Difficulty no   Dressing/Bathing Difficulty no   Toileting issues no   Doing Errands Independently Difficulty (such as shopping) no   Fall history within last six months no   Change in Functional Status Since Onset of Current Illness/Injury yes   General Information   Onset of Illness/Injury or Date of Surgery 01/24/21   Referring Physician Chano Thompson, APRN CNP   Patient/Family Therapy Goals Statement (PT) To return home   Pertinent History of Current Problem (include personal factors and/or comorbidities that impact the POC) Per EMR \"Kecia Blue is a 60 year old female with PMH significant for HTN, ESRD on dialysis, 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 ALL assessment sites listed below have been assessed. Areas assessed by both nurses:    Head, Face, Ears, Shoulders, Back, Chest, Arms, Elbows, Hands, Sacrum. Buttock, Coccyx, Ischium, Legs. Feet and Heels, and Under Medical Devices         Does the Patient have a Wound? Yes wound(s) were present on assessment.  LDA wound assessment was Initiated and completed by RN       Reddy Prevention initiated by RN: Yes  Wound Care Orders initiated by RN: Yes    Pressure Injury (Stage 3,4, Unstageable, DTI, NWPT, and Complex wounds) if present, place Wound referral order by RN under : Yes    New Ostomies, if present place, Ostomy referral order under : No     Nurse 1 eSignature: Electronically signed by Román Maldonado RN on 8/23/23 at 6:45 AM EDT    **SHARE this note so that the co-signing nurse can place an eSignature**    Nurse 2 eSignature: Electronically signed by Gwyn Hillman RN on 8/23/23 at 10:44 AM EDT "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 She was transferred to [facility] ICU 1/22 and intubated and requiring vasopressors. 1/24 transferred to [facility] ICU service for ongoing management.\"   General Observations verbal orders clearning patient for participation in therapy   Cognition   Affect/Mental Status (Cognition) WNL   Follows Commands (Cognition) WNL   Cognitive Status Comments patient with difficutly staying awake otherwise cognition WNL   Range of Motion (ROM)   ROM Quick Adds ROM WNL   Strength   Manual Muscle Testing Quick Adds Strength WFL   Strength Comments grossly deconditioned   Bed Mobility   Bed Mobility sit-supine   Sit-Supine [place] (Bed Mobility) moderate assist (50% patient effort)   Bed Mobility Limitations impaired ability to control trunk for mobility   Impairments Contributing to Impaired Bed Mobility impaired postural control;decreased strength   Transfers   Transfers sit-stand transfer   Transfer Safety Concerns Noted losing balance backward   Impairments Contributing to Impaired Transfers decreased strength   Transfer Safety Comments significant posterior lean requiring sustained mod A   Sit-Stand Transfer   Sit-Stand [place] (Transfers) moderate assist (50% patient effort)   Sit/Stand Transfer Comments B UE support on PT   Balance   Balance Comments CGA for unsupported sitting but mod A  for unsupported standing   Clinical Impression   Criteria for Skilled Therapeutic Intervention yes, treatment indicated   PT Diagnosis (PT) impaired functional mobility   Influenced by the following impairments decreased functional strength, impaired acitivity tolerance   Functional limitations due to impairments bed mobility, transfers, gait, ambulation   Clinical Presentation Stable/Uncomplicated   Clinical Presentation " Rationale clinical judgement   Clinical Decision Making (Complexity) low complexity   Therapy Frequency (PT) 5x/week   Predicted Duration of Therapy Intervention (days/wks) 2 weeks   Planned Therapy Interventions (PT) gait training;balance training;bed mobility training;stair training;ROM (range of motion);strengthening;stretching;transfer training   Risk & Benefits of therapy have been explained evaluation/treatment results reviewed;care plan/treatment goals reviewed;patient;spouse/significant other   PT Discharge Planning    PT Discharge Recommendation (DC Rec) home with assist;home with outpatient pulmonary rehab   PT Rationale for DC Rec Anticipate patient will progress well in house and become appropriate for d/c home when medically stable.    PT Brief overview of current status  mod A for sit<>stand and stand pivot transfer   Total Evaluation Time   Total Evaluation Time (Minutes) 8       Lawson Jacinto PT, DPT  Pager #[phone]

## 2023-08-23 NOTE — TELEPHONE ENCOUNTER
Called pt and confirmed plan for pt to have follow up in tx clinic after completion of bactrim on 9/5.  Appt scheduled on 9/7.

## 2023-09-03 NOTE — PROGRESS NOTES
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 She recently underwent bronch and grew out steno on cultures. She has completed a round of levaquin and Bactrim without improvement.     1. Bilateral lung transplant: ongoing frequent cough and lower energy, just not feeling great. Congestion improved, does have dyspnea with exertion. Sating 95% on room air. DSA 8/8 and CMV 8/17 negative. S/p bronch on 8/17 + for steno s/p bactrim (prior course of levaquin). CXR reviewed by me with stable basilar and perihilar opacities. PFTs continue to trend down from April 2023.  - Immunknow ordered  - Start minocycline 100 mg BID X 14 days  - Looking into a PICC line and IV avycaz  - Continue AZA 75 mg daily, tacrolimus (goal 8-10) and prednisone, decreased to 5 mg daily for significant osteoporosis  - On Bactrim 3 times/week  - EKG today, then start CLAD therapy: azithromycin, singular and advair    2. EBV viremia: last level of 42K (log 4.6) on 8/8.   - Recheck at next follow up    3. Right main bronchial stenosis s/p ligation: with multiple bronchs in the OR, last included debulking/dilation of the RERE. Most recent bronch on 8/17 demonstrates 50% stenosis.  - Treatment per above, will likely discuss with IP    4. Left-sided aspergillus empyema: noted in 2019 s/p needle aspiration with Aspergillus fumigatus on cultures s/p intrapleural amphotericin bead placement. Posaconazole indefinitely per ID.  - Continue posaconazole    5. ESRD on iHD (since  10/2019):  HTN: continues on iHD M/W/F. Renal transplant listing on hold for another few months s/p knee surgeries.  - Continue metoprolol, renal MVI, phosphate binder    6. Osteoporosis: on DEXA from 2022 with significant loss of bone in her lumbar spine and femur. Complicated by steroid use and fact that her activity was severely limited by her knee pain. Vitamin D level of 40. Following with Endocrine, supposed to get Prolia injection.   - Continue calcium/vitamin D    Chronic issues:  1. Paroxysmal AFib  2. Hypomagnesemia  3. Dermatomyositis with Raynauds  4. Congestive hepatopathy with fibrosis    RTC: 3-4 weeks  Vaccinations: will need influenza, covid and RSV vaccine  Preventative: colonoscopy due 2033; mammogram in February negative; DEXA > May 2024; following with Alexia Chow PA-C  Pulmonary, Allergy, Critical Care and Sleep Medicine        Interval History:     Felt a little better with the Bactrim, but still coughing, slightly less frequent. Energy is a little better, but not a lot. No fever or chills, but always cold. No allergy symptoms, no PND. Mucous is clear phlegm, chest congestion has improved, no tightness, still with shortness of breath with walking (house to garage), no wheezing. No chest pain or palpitations. No bloating or gas, BMs are normal.           Review of Systems:   Please see HPI, otherwise the complete 10 point ROS is negative.           Past Medical and Surgical History:     Past Medical History:   Diagnosis Date    Acute on chronic respiratory failure with hypoxia (H) 02/21/2018    Anisocoria     Antisynthetase syndrome (H) 2014    Anxiety     Aspergillus (H)     Aspergillus pneumonia (H) 11/20/2020    Benign essential hypertension     C. difficile colitis     Cytomegalovirus (CMV) viremia (H)     Dermatomyositis (H)     Dysplasia of cervix, low grade (ESTRADA 1)     EBV (Franklin-Barr virus) viremia     ESRD (end stage renal disease) on dialysis (H)     ILD (interstitial lung  disease) (H)     Lung replaced by transplant (H)     Osteopenia     Paroxysmal atrial fibrillation (H)     Pneumocystis jiroveci pneumonia (H)     PONV (postoperative nausea and vomiting)     Post-menopause     Pulmonary hypertension (H)     Raynaud's disease     Seronegative rheumatoid arthritis (H)      Past Surgical History:   Procedure Laterality Date    BRONCHOSCOPY (RIGID OR FLEXIBLE), DIAGNOSTIC N/A 4/10/2018    Procedure: COMBINED BRONCHOSCOPY (RIGID OR FLEXIBLE), LAVAGE;;  Surgeon: Mariposa Donohue MD;  [facility]    BRONCHOSCOPY (RIGID OR FLEXIBLE), DIAGNOSTIC N/A 12/23/2020    Procedure: BRONCHOSCOPY, WITH BRONCHOALVEOLAR LAVAGE;  Surgeon: Uri Bass MD;  [facility]    BRONCHOSCOPY (RIGID OR FLEXIBLE), DIAGNOSTIC N/A 5/26/2022    Procedure: BRONCHOSCOPY, WITH BRONCHOALVEOLAR LAVAGE;  Surgeon: Uri Bass MD;  [facility]    BRONCHOSCOPY (RIGID OR FLEXIBLE), DIAGNOSTIC N/A 8/17/2023    Procedure: BRONCHOSCOPY, WITH BRONCHOALVEOLAR LAVAGE;  Surgeon: Esau Bruno MD;  [facility]    BRONCHOSCOPY (RIGID OR FLEXIBLE), DILATE BRONCHUS / TRACHEA N/A 10/11/2018    Procedure: BRONCHOSCOPY (RIGID OR FLEXIBLE), DILATE BRONCHUS / TRACHEA;  Flexible And Rigid Bronchoscopy and Dilation;  Surgeon: Wilber Lin MD;  [facility]    BRONCHOSCOPY FLEXIBLE N/A 3/13/2018    Procedure: BRONCHOSCOPY FLEXIBLE;  Flexible Bronchoscopy ;  Surgeon: Gissell Sanchez MD;  [facility]    BRONCHOSCOPY FLEXIBLE N/A 5/9/2018    Procedure: BRONCHOSCOPY FLEXIBLE;;  Surgeon: Wilber Lin MD;  [facility]    BRONCHOSCOPY FLEXIBLE AND RIGID N/A 9/10/2018    Procedure: BRONCHOSCOPY FLEXIBLE AND RIGID;  Flexible and Rigid Bronchoscopy with Balloon Dilation, tissue debulking with cryo, and Right mainstem bronchus stent placement;  Surgeon: Wilber Lin MD;  [facility]    BRONCHOSCOPY RIGID N/A 6/6/2018    Procedure: BRONCHOSCOPY RIGID;;  Surgeon:  Lopez Macias MD;  [facility]    BRONCHOSCOPY, DILATE BRONCHUS, STENT BRONCHUS, COMBINED N/A 6/11/2018    Procedure: COMBINED BRONCHOSCOPY, DILATE BRONCHUS, STENT BRONCHUS;  Flexible Bronchoscopy, Balloon Dilation, Bronchial Washings;  Surgeon: Wilber Lin MD;  [facility]    BRONCHOSCOPY, DILATE BRONCHUS, STENT BRONCHUS, COMBINED Right 7/10/2018    Procedure: COMBINED BRONCHOSCOPY, DILATE BRONCHUS, STENT BRONCHUS;  Flexible Bronchoscopy, Balloon Dilation, Bronchial Washings  ;  Surgeon: Wilber Lin MD;  [facility]    BRONCHOSCOPY, DILATE BRONCHUS, STENT BRONCHUS, COMBINED N/A 8/2/2018    Procedure: COMBINED BRONCHOSCOPY, DILATE BRONCHUS, STENT BRONCHUS;  Flexible Bronchoscopy, Bronchial Washings, Balloon Dilation;  Surgeon: Wilber Lin MD;  [facility]    BRONCHOSCOPY, DILATE BRONCHUS, STENT BRONCHUS, COMBINED N/A 8/20/2018    Procedure: COMBINED BRONCHOSCOPY, DILATE BRONCHUS, STENT BRONCHUS;  Flexible Bronchoscopy, Balloon Dilation;  Surgeon: Wilber Lin MD;  [facility]    BRONCHOSCOPY, DILATE BRONCHUS, STENT BRONCHUS, COMBINED N/A 10/29/2018    Procedure: Flexible Bronchoscopy, Balloon Dilation, Stent Revision, Airway Examination And Therapeutic Suctioning, Cyro Tumor Debulking;  Surgeon: Wilber Lin MD;  [facility]    BRONCHOSCOPY, DILATE BRONCHUS, STENT BRONCHUS, COMBINED N/A 11/20/2018    Procedure: Rigid Bronchoscopy, Stent Removal and dilitation;  Surgeon: Wilber Lin MD;  [facility]    BRONCHOSCOPY, DILATE BRONCHUS, STENT BRONCHUS, COMBINED N/A 12/14/2018    Procedure: Flexible And Rigid Bronchoscopy, Balloon Dilation, Bronchial Washing;  Surgeon: Wilber Lin MD;  [facility]    BRONCHOSCOPY, DILATE BRONCHUS, STENT BRONCHUS, COMBINED N/A 1/17/2019    Procedure: Flexible And Rigid Bronchoscopy, Balloon Dilation.;  Surgeon: Wilber Lin MD;  [facility]    BRONCHOSCOPY,  DILATE BRONCHUS, STENT BRONCHUS, COMBINED N/A 3/7/2019    Procedure: Flexible and Rigid Bronchoscopy, Bronchial Washing, Balloon Dilation;  Surgeon: Wilber Lin MD;  [facility]    BRONCHOSCOPY, DILATE BRONCHUS, STENT BRONCHUS, COMBINED N/A 6/6/2019    Procedure: Rigid and Flexible Bronchoscopy, Balloon Dilation;  Surgeon: Wilber Lin MD;  [facility]    BRONCHOSCOPY, DILATE BRONCHUS, STENT BRONCHUS, COMBINED N/A 10/11/2019    Procedure: Flexible and Rigid Bronchoscopy, Balloon Dilation, Bronchoalveolar Lagave;  Surgeon: Wilber Lin MD;  [facility]    BRONCHOSCOPY, DILATE BRONCHUS, STENT BRONCHUS, COMBINED N/A 2/19/2021    Procedure: BRONCHOSCOPY, flexible, airway dilation, and bronchial wash;  Surgeon: Wilber Lin MD;  [facility]    BRONCHOSCOPY, DILATE BRONCHUS, STENT BRONCHUS, COMBINED N/A 4/9/2021    Procedure: BRONCHOSCOPY, flexible and rigid, Airway suctioning;  Surgeon: Mati Norris MD;  [facility]    CV RIGHT HEART CATH MEASUREMENTS RECORDED N/A 3/10/2020    Procedure: CV RIGHT HEART CATH;  Surgeon: Wai Garcia MD;  [facility]    ENT SURGERY      tonsillectomy as a child    ESOPHAGOSCOPY, GASTROSCOPY, DUODENOSCOPY (EGD), COMBINED N/A 10/29/2018    Procedure: COMBINED ESOPHAGOSCOPY, GASTROSCOPY, DUODENOSCOPY (EGD) with biopsies and polypectomy;  Surgeon: Chente Bloom MD;  [facility]    INSERT EXTRACORPORAL MEMBRANE OXYGENATOR ADULT (OUTSIDE OR) N/A 2/27/2018    Procedure: INSERT EXTRACORPORAL MEMBRANE OXYGENATOR ADULT (OUTSIDE OR);  INSERT EXTRACORPORAL MEMBRANE OXYGENATOR ADULT (OUTSIDE OR) ;  Surgeon: Toby Hernandez MD;  [facility]    IR CVC TUNNEL PLACEMENT > 5 YRS OF AGE  10/25/2019    IR DIALYSIS FISTULOGRAM LEFT  3/2/2021    IR DIALYSIS MECH THROMB, PTA  3/2/2021    IR FLUORO 0-1 HOUR  5/7/2021    IR GASTRO JEJUNOSTOMY TUBE PLACEMENT  2/16/2021    IR PARACENTESIS   1/8/2020    IR THORACENTESIS  9/13/2019    IR TRANSCATHETER BIOPSY  1/8/2020    LASER CO2 BRONCHOSCOPY N/A 4/30/2021    Procedure: Flexible and Rigid Bronchoscopy and Tissue Debulking with CO2 Laser Assistance;  Surgeon: Mati Norris MD;  [facility]    LASER CO2 BRONCHOSCOPY N/A 6/11/2021    Procedure: BRONCHOSCOPY, Flexible and Rigid Bronchoscopy, Tissue Debulking with cryo Assistance, airway dilation,;  Surgeon: Mati Norris MD;  [facility]    LASER CO2 BRONCHOSCOPY N/A 9/16/2021    Procedure: BRONCHOSCOPY, flexible and rigid, CO2 Laser Debulking;  Surgeon: Mati Norris MD;  [facility]    LASER CO2 BRONCHOSCOPY N/A 2/11/2022    Procedure: flexible, rigid bronchoscopy, tissue debulking, airway dilation, co2 laser, bronchoalveolar lavage;  Surgeon: Juana Baugh MD;  [facility]    no prior surgery      REMOVE EXTRACORPORAL MEMBRANE OXYGENATOR ADULT N/A 3/3/2018    Procedure: REMOVE EXTRACORPORAL MEMBRANE OXYGENATOR ADULT;  Removal of Right Femoral Venous and Right Axillary Arterial Extracorporeal Membrane Oxygenator;  Surgeon: Toby Hernandez MD;  [facility]    TRANSPLANT LUNG RECIPIENT SINGLE X2 Bilateral 3/1/2018    Procedure: TRANSPLANT LUNG RECIPIENT SINGLE X2;  Median Sternotomy, Extracorporeal Membrane Oxygenator, bilateral sequential lung transplant;  Surgeon: Toby Hernandez MD;  [facility]           Family History:     Family History   Problem Relation Age of Onset    Hypertension Mother     Arthritis Mother     Pancreatic Cancer Father     Diabetes Father             Social History:     Social History     Socioeconomic History    Marital status:      Spouse name: Not on file    Number of children: Not on file    Years of education: Not on file    Highest education level: Not on file   Occupational History    Not on file   Tobacco Use    Smoking status: Never    Smokeless tobacco: Never   Substance and Sexual Activity    Alcohol use:  "No     Alcohol/week: 1.0 standard drink of alcohol     Types: 1 Glasses of wine per week    Drug use: No    Sexual activity: Not on file   Other Topics Concern    Parent/sibling w/ CABG, MI or angioplasty before 65F 55M? No   Social History Narrative    3/6/2019 - Lives with . Has three daughters. Four grandchildren (two ). No pets. Travelled previously to [facility]. Has visited [place] several times.      Social Determinants of Health     Financial Resource Strain: Not on file   Food Insecurity: Not on file   Transportation Needs: Not on file   Physical Activity: Not on file   Stress: Not on file   Social Connections: Not on file   Intimate Partner Violence: Not on file   Housing Stability: Not on file            Medications:     Current Outpatient Medications   Medication    acetaminophen (TYLENOL) 325 MG tablet    albuterol (PROVENTIL) (2.5 MG/3ML) 0.083% neb solution    azaTHIOprine (IMURAN) 50 MG tablet    azithromycin (ZITHROMAX) 250 MG tablet    budesonide (PULMICORT) 0.5 MG/2ML neb solution    calcium acetate (PHOSLO) 667 MG CAPS capsule    fluticasone-salmeterol (ADVAIR) 250-50 MCG/ACT inhaler    Magnesium Cl-Calcium Carbonate (SLOW-MAG) 71.5-119 MG TBEC    metoprolol tartrate (LOPRESSOR) 25 MG tablet    minocycline (DYNACIN) 100 MG tablet    montelukast (SINGULAIR) 10 MG tablet    multivitamin RENAL (RENAVITE RX/NEPHROVITE) 1 tablet tablet    pantoprazole (PROTONIX) 40 MG EC tablet    posaconazole (NOXAFIL) 100 MG EC tablet    predniSONE (DELTASONE) 5 MG tablet    sulfamethoxazole-trimethoprim (BACTRIM) 400-80 MG tablet    tacrolimus (GENERIC EQUIVALENT) 0.5 MG capsule    tacrolimus (GENERIC EQUIVALENT) 1 MG capsule     No current facility-administered medications for this visit.            Physical Exam:   BP (!) 142/88   Pulse 90   Resp 17   Ht 1.626 m (5' 4\")   Wt 61.1 kg (134 lb 11.2 oz)   LMP 2014 (Exact Date)   SpO2 95%   BMI 23.12 kg/m      GENERAL: alert, " NAD  HEENT: NCAT, EOMI, no scleral icterus, oral mucosa moist and without lesions  Neck: no cervical or supraclavicular adenopathy  Lungs: moderate airflow, scattered expiratory wheezing, few coarse BS  CV: irregular rhythm, S1S2, no murmurs noted  Abdomen: normoactive BS, soft  Lymph: no edema  Neuro: AAO X 3, CN 2-12 grossly intact  Psychiatric: normal affect, good eye contact  Skin: scattered lesions on her face         Data:   All laboratory and imaging data reviewed.      Recent Results (from the past 168 hour(s))   Basic metabolic panel    Collection Time: 09/07/23  7:10 AM   Result Value Ref Range    Sodium 139 136 - 145 mmol/L    Potassium 4.5 3.4 - 5.3 mmol/L    Chloride 97 (L) 98 - 107 mmol/L    Carbon Dioxide (CO2) 32 (H) 22 - 29 mmol/L    Anion Gap 10 7 - 15 mmol/L    Urea Nitrogen 22.2 8.0 - 23.0 mg/dL    Creatinine 3.79 (H) 0.51 - 0.95 mg/dL    Calcium 9.4 8.8 - 10.2 mg/dL    Glucose 156 (H) 70 - 99 mg/dL    GFR Estimate 13 (L) >60 mL/min/1.73m2   Magnesium    Collection Time: 09/07/23  7:10 AM   Result Value Ref Range    Magnesium 1.8 1.7 - 2.3 mg/dL   CBC with platelets    Collection Time: 09/07/23  7:10 AM   Result Value Ref Range    WBC Count 5.3 4.0 - 11.0 10e3/uL    RBC Count 3.28 (L) 3.80 - 5.20 10e6/uL    Hemoglobin 10.7 (L) 11.7 - 15.7 g/dL    Hematocrit 34.5 (L) 35.0 - 47.0 %     (H) 78 - 100 fL    MCH 32.6 26.5 - 33.0 pg    MCHC 31.0 (L) 31.5 - 36.5 g/dL    RDW 17.4 (H) 10.0 - 15.0 %    Platelet Count 187 150 - 450 10e3/uL   General PFT Lab (Please always keep checked)    Collection Time: 09/07/23  8:16 AM   Result Value Ref Range    FVC-Pred 2.91 L    FVC-Pre 1.25 L    FVC-%Pred-Pre 42 %    FEV1-Pre 1.07 L    FEV1-%Pred-Pre 46 %    FEV1FVC-Pred 80 %    FEV1FVC-Pre 86 %    FEFMax-Pred 6.29 L/sec    FEFMax-Pre 3.09 L/sec    FEFMax-%Pred-Pre 49 %    FEF2575-Pred 2.16 L/sec    FEF2575-Pre 1.10 L/sec    YCT7515-%Pred-Pre 50 %    ExpTime-Pre 5.00 sec    FIFMax-Pre 2.66 L/sec     FEV1FEV6-Pred 81 %    FEV1FEV6-Pre 86 %     PFT interpretation:  Maneuver: valid and met ATS guidelines  Moderate obstruction based on Z score  Compared to prior: FEV1 of 1.07 is 50 ml below prior  Decrease in FVC may be related to obstruction vs restriction; lung volumes would be necessary to determine

## 2023-09-07 NOTE — PATIENT INSTRUCTIONS
Patient Instructions  1. Continue to hydrate with 60-70 oz fluids daily.  2. Continue to exercise daily or most days of the week.  3. Follow up with your primary care provider for annual gender health maintenance procedures.  4. Follow up with colonoscopy schedule.  5. Follow up with annual dermatology visits.  6. Plan to start treatment for chronic lung dysfunction: azithromycin 250 mg daily, Advair 1 puff twice daily, and Singulair 10 mg daily.   7. We will work on starting you on a course of IV antibiotics.   8. Start minocycline 100 mg twice daily x10 days.   9. PFTs locally once you finish the minocycline.     Next transplant clinic appointment: 1 month with CXR, labs and PFTs  Next lab draw: today

## 2023-09-07 NOTE — LETTER
PHYSICIAN ORDERS      DATE & TIME ISSUED: 2023 2:59 PM  PATIENT NAME: Kecia Blue   : 1960     [facility] MR# [if applicable]: [id number]     DIAGNOSIS:  Lung Transplant  Z94.2    Patient needs PFTs (spirometry only) on approximately 23 (as early in the week as possible)    Any questions please call: Mikayla     Please fax these results to [phone].        Ame Chow PA-C

## 2023-09-07 NOTE — NURSING NOTE
Transplant Coordinator Note    Reason for visit: Post lung transplant follow up visit   Coordinator: Present   Caregiver: PresentRanjan.     Health concerns addressed today:  1. Resp: still coughing after Bactrim treatment. Not feeling as congested.   2. Feels tired all the time.   3.   4.     Activity/rehab: Walking everyday for 5-10 minutes.   Oxygen needs: Room air  Pain management/RX: Denies  High risk donor: Yes  CMV status: D+/R+  DVT/PE: H/o DVT  Post op AFIB/follow up with EP: One time occurrence of a-fib  PJP prophylactic: Bactrim     COVID:  COVID-19 infection (yes/no, date of most recent positive test):   Status/instructions given about COVID-19 vaccine:      Pt Education: medications (use/dose/side effects), how/when to call coordinator, frequency of labs, s/s of infection/rejection, call prior to starting any new medications, lab/vital sign book     Health Maintenance:   Last colonoscopy: 7/25/23  Next colonoscopy due: 10 years   Dermatology:   Vaccinations this visit: None     Labs, CXR, PFTs reviewed with patient  Medication record reviewed and reconciled  Questions and concerns addressed     Patient Instructions  1. Continue to hydrate with 60-70 oz fluids daily.  2. Continue to exercise daily or most days of the week.  3. Follow up with your primary care provider for annual gender health maintenance procedures.  4. Follow up with colonoscopy schedule.  5. Follow up with annual dermatology visits.  6. Plan to start treatment for chronic lung dysfunction: azithromycin 250 mg daily, Advair 1 puff twice daily, and Singulair 10 mg daily.   7. We will work on starting you on a course of IV antibiotics.   8. Start minocycline 100 mg twice daily x10 days.   9. PFTs locally once you finish the minocycline.     Next transplant clinic appointment: 1 month with CXR, labs and PFTs  Next lab draw: today     AVS printed at time of check out

## 2023-09-07 NOTE — LETTER
9/7/2023         RE: Kecia Blue  [address] [place] [address]        Dear Colleague,    Thank you for referring your patient, Kecia Blue, to the [facility] [place]. Please see a copy of my visit note below.    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 She recently underwent bronch and grew out steno on cultures. She has completed a round of levaquin and Bactrim without improvement.     1. Bilateral lung transplant: ongoing frequent cough and lower energy, just not feeling great. Congestion improved, does have dyspnea with exertion. Sating 95% on room air. DSA 8/8 and CMV 8/17 negative. S/p bronch on 8/17 + for steno s/p bactrim (prior course of levaquin). CXR reviewed by me with stable basilar and perihilar opacities. PFTs continue to trend down from April 2023.  - Immunknow ordered  - Start minocycline 100 mg BID X 14 days  - Looking into a PICC line and IV avycaz  - Continue AZA 75 mg daily, tacrolimus (goal 8-10) and prednisone, decreased to 5 mg daily for significant osteoporosis  - On Bactrim 3 times/week  - EKG today, then start CLAD therapy: azithromycin, singular and advair    2. EBV viremia: last level of 42K (log 4.6) on 8/8.   - Recheck at next follow up    3. Right main bronchial stenosis s/p ligation: with multiple bronchs in the OR, last included debulking/dilation of the RERE. Most recent bronch on 8/17 demonstrates 50% stenosis.  -  Treatment per above, will likely discuss with IP    4. Left-sided aspergillus empyema: noted in 2019 s/p needle aspiration with Aspergillus fumigatus on cultures s/p intrapleural amphotericin bead placement. Posaconazole indefinitely per ID.  - Continue posaconazole    5. ESRD on iHD (since 10/2019):  HTN: continues on iHD M/W/F. Renal transplant listing on hold for another few months s/p knee surgeries.  - Continue metoprolol, renal MVI, phosphate binder    6. Osteoporosis: on DEXA from 2022 with significant loss of bone in her lumbar spine and femur. Complicated by steroid use and fact that her activity was severely limited by her knee pain. Vitamin D level of 40. Following with Endocrine, supposed to get Prolia injection.   - Continue calcium/vitamin D    Chronic issues:  1. Paroxysmal AFib  2. Hypomagnesemia  3. Dermatomyositis with Raynauds  4. Congestive hepatopathy with fibrosis    RTC: 3-4 weeks  Vaccinations: will need influenza, covid and RSV vaccine  Preventative: colonoscopy due 2033; mammogram in February negative; DEXA > May 2024; following with Alexia Chow PA-C  Pulmonary, Allergy, Critical Care and Sleep Medicine        Interval History:     Felt a little better with the Bactrim, but still coughing, slightly less frequent. Energy is a little better, but not a lot. No fever or chills, but always cold. No allergy symptoms, no PND. Mucous is clear phlegm, chest congestion has improved, no tightness, still with shortness of breath with walking (house to garage), no wheezing. No chest pain or palpitations. No bloating or gas, BMs are normal.           Review of Systems:   Please see HPI, otherwise the complete 10 point ROS is negative.           Past Medical and Surgical History:     Past Medical History:   Diagnosis Date     Acute on chronic respiratory failure with hypoxia (H) 02/21/2018     Anisocoria      Antisynthetase syndrome (H) 2014     Anxiety      Aspergillus (H)      Aspergillus  pneumonia (H) 11/20/2020     Benign essential hypertension      C. difficile colitis      Cytomegalovirus (CMV) viremia (H)      Dermatomyositis (H)      Dysplasia of cervix, low grade (ESTRADA 1)      EBV (Franklin-Barr virus) viremia      ESRD (end stage renal disease) on dialysis (H)      ILD (interstitial lung disease) (H)      Lung replaced by transplant (H)      Osteopenia      Paroxysmal atrial fibrillation (H)      Pneumocystis jiroveci pneumonia (H)      PONV (postoperative nausea and vomiting)      Post-menopause      Pulmonary hypertension (H)      Raynaud's disease      Seronegative rheumatoid arthritis (H)      Past Surgical History:   Procedure Laterality Date     BRONCHOSCOPY (RIGID OR FLEXIBLE), DIAGNOSTIC N/A 4/10/2018    Procedure: COMBINED BRONCHOSCOPY (RIGID OR FLEXIBLE), LAVAGE;;  Surgeon: Mariposa Donohue MD;  [facility]     BRONCHOSCOPY (RIGID OR FLEXIBLE), DIAGNOSTIC N/A 12/23/2020    Procedure: BRONCHOSCOPY, WITH BRONCHOALVEOLAR LAVAGE;  Surgeon: Uri Bass MD;  [facility]     BRONCHOSCOPY (RIGID OR FLEXIBLE), DIAGNOSTIC N/A 5/26/2022    Procedure: BRONCHOSCOPY, WITH BRONCHOALVEOLAR LAVAGE;  Surgeon: Uri Bass MD;  [facility]     BRONCHOSCOPY (RIGID OR FLEXIBLE), DIAGNOSTIC N/A 8/17/2023    Procedure: BRONCHOSCOPY, WITH BRONCHOALVEOLAR LAVAGE;  Surgeon: Esau Bruno MD;  [facility]     BRONCHOSCOPY (RIGID OR FLEXIBLE), DILATE BRONCHUS / TRACHEA N/A 10/11/2018    Procedure: BRONCHOSCOPY (RIGID OR FLEXIBLE), DILATE BRONCHUS / TRACHEA;  Flexible And Rigid Bronchoscopy and Dilation;  Surgeon: Wilber Lin MD;  [facility]     BRONCHOSCOPY FLEXIBLE N/A 3/13/2018    Procedure: BRONCHOSCOPY FLEXIBLE;  Flexible Bronchoscopy ;  Surgeon: Gissell Sanchez MD;  [facility]     BRONCHOSCOPY FLEXIBLE N/A 5/9/2018    Procedure: BRONCHOSCOPY FLEXIBLE;;  Surgeon: Wilber Lin MD;  [facility]     BRONCHOSCOPY FLEXIBLE AND RIGID  N/A 9/10/2018    Procedure: BRONCHOSCOPY FLEXIBLE AND RIGID;  Flexible and Rigid Bronchoscopy with Balloon Dilation, tissue debulking with cryo, and Right mainstem bronchus stent placement;  Surgeon: Wilber Lin MD;  [facility]     BRONCHOSCOPY RIGID N/A 6/6/2018    Procedure: BRONCHOSCOPY RIGID;;  Surgeon: Lopez Macias MD;  [facility]     BRONCHOSCOPY, DILATE BRONCHUS, STENT BRONCHUS, COMBINED N/A 6/11/2018    Procedure: COMBINED BRONCHOSCOPY, DILATE BRONCHUS, STENT BRONCHUS;  Flexible Bronchoscopy, Balloon Dilation, Bronchial Washings;  Surgeon: Wilber Lin MD;  [facility]     BRONCHOSCOPY, DILATE BRONCHUS, STENT BRONCHUS, COMBINED Right 7/10/2018    Procedure: COMBINED BRONCHOSCOPY, DILATE BRONCHUS, STENT BRONCHUS;  Flexible Bronchoscopy, Balloon Dilation, Bronchial Washings  ;  Surgeon: Wilebr Lin MD;  [facility]     BRONCHOSCOPY, DILATE BRONCHUS, STENT BRONCHUS, COMBINED N/A 8/2/2018    Procedure: COMBINED BRONCHOSCOPY, DILATE BRONCHUS, STENT BRONCHUS;  Flexible Bronchoscopy, Bronchial Washings, Balloon Dilation;  Surgeon: Wilber Lin MD;  [facility]     BRONCHOSCOPY, DILATE BRONCHUS, STENT BRONCHUS, COMBINED N/A 8/20/2018    Procedure: COMBINED BRONCHOSCOPY, DILATE BRONCHUS, STENT BRONCHUS;  Flexible Bronchoscopy, Balloon Dilation;  Surgeon: Wilber Lin MD;  [facility]     BRONCHOSCOPY, DILATE BRONCHUS, STENT BRONCHUS, COMBINED N/A 10/29/2018    Procedure: Flexible Bronchoscopy, Balloon Dilation, Stent Revision, Airway Examination And Therapeutic Suctioning, Cyro Tumor Debulking;  Surgeon: Wilber Lin MD;  [facility]     BRONCHOSCOPY, DILATE BRONCHUS, STENT BRONCHUS, COMBINED N/A 11/20/2018    Procedure: Rigid Bronchoscopy, Stent Removal and dilitation;  Surgeon: Wilber Lin MD;  [facility]     BRONCHOSCOPY, DILATE BRONCHUS, STENT BRONCHUS, COMBINED N/A 12/14/2018    Procedure:  Flexible And Rigid Bronchoscopy, Balloon Dilation, Bronchial Washing;  Surgeon: Wilber Lin MD;  [facility]     BRONCHOSCOPY, DILATE BRONCHUS, STENT BRONCHUS, COMBINED N/A 1/17/2019    Procedure: Flexible And Rigid Bronchoscopy, Balloon Dilation.;  Surgeon: Wilber Lin MD;  [facility]     BRONCHOSCOPY, DILATE BRONCHUS, STENT BRONCHUS, COMBINED N/A 3/7/2019    Procedure: Flexible and Rigid Bronchoscopy, Bronchial Washing, Balloon Dilation;  Surgeon: Wilber Lin MD;  [facility]     BRONCHOSCOPY, DILATE BRONCHUS, STENT BRONCHUS, COMBINED N/A 6/6/2019    Procedure: Rigid and Flexible Bronchoscopy, Balloon Dilation;  Surgeon: Wilber Lin MD;  [facility]     BRONCHOSCOPY, DILATE BRONCHUS, STENT BRONCHUS, COMBINED N/A 10/11/2019    Procedure: Flexible and Rigid Bronchoscopy, Balloon Dilation, Bronchoalveolar Lagave;  Surgeon: Wilber Lin MD;  [facility]     BRONCHOSCOPY, DILATE BRONCHUS, STENT BRONCHUS, COMBINED N/A 2/19/2021    Procedure: BRONCHOSCOPY, flexible, airway dilation, and bronchial wash;  Surgeon: Wilber Lin MD;  [facility]     BRONCHOSCOPY, DILATE BRONCHUS, STENT BRONCHUS, COMBINED N/A 4/9/2021    Procedure: BRONCHOSCOPY, flexible and rigid, Airway suctioning;  Surgeon: Mati Norris MD;  [facility]     CV RIGHT HEART CATH MEASUREMENTS RECORDED N/A 3/10/2020    Procedure: CV RIGHT HEART CATH;  Surgeon: Wai Garcia MD;  Location:  HEART CARDIAC CATH LAB     ENT SURGERY      tonsillectomy as a child     ESOPHAGOSCOPY, GASTROSCOPY, DUODENOSCOPY (EGD), COMBINED N/A 10/29/2018    Procedure: COMBINED ESOPHAGOSCOPY, GASTROSCOPY, DUODENOSCOPY (EGD) with biopsies and polypectomy;  Surgeon: Chente Bloom MD;  [facility]     INSERT EXTRACORPORAL MEMBRANE OXYGENATOR ADULT (OUTSIDE OR) N/A 2/27/2018    Procedure: INSERT EXTRACORPORAL MEMBRANE OXYGENATOR ADULT (OUTSIDE OR);  INSERT  EXTRACORPORAL MEMBRANE OXYGENATOR ADULT (OUTSIDE OR) ;  Surgeon: Toby Hernandez MD;  [facility]     IR CVC TUNNEL PLACEMENT > 5 YRS OF AGE  10/25/2019     IR DIALYSIS FISTULOGRAM LEFT  3/2/2021     IR DIALYSIS MECH THROMB, PTA  3/2/2021     IR FLUORO 0-1 HOUR  5/7/2021     IR GASTRO JEJUNOSTOMY TUBE PLACEMENT  2/16/2021     IR PARACENTESIS  1/8/2020     IR THORACENTESIS  9/13/2019     IR TRANSCATHETER BIOPSY  1/8/2020     LASER CO2 BRONCHOSCOPY N/A 4/30/2021    Procedure: Flexible and Rigid Bronchoscopy and Tissue Debulking with CO2 Laser Assistance;  Surgeon: Mati Norris MD;  [facility]     LASER CO2 BRONCHOSCOPY N/A 6/11/2021    Procedure: BRONCHOSCOPY, Flexible and Rigid Bronchoscopy, Tissue Debulking with cryo Assistance, airway dilation,;  Surgeon: Mati Norris MD;  [facility]     LASER CO2 BRONCHOSCOPY N/A 9/16/2021    Procedure: BRONCHOSCOPY, flexible and rigid, CO2 Laser Debulking;  Surgeon: Mati Norris MD;  [facility]     LASER CO2 BRONCHOSCOPY N/A 2/11/2022    Procedure: flexible, rigid bronchoscopy, tissue debulking, airway dilation, co2 laser, bronchoalveolar lavage;  Surgeon: Juana Baugh MD;  [facility]     no prior surgery       REMOVE EXTRACORPORAL MEMBRANE OXYGENATOR ADULT N/A 3/3/2018    Procedure: REMOVE EXTRACORPORAL MEMBRANE OXYGENATOR ADULT;  Removal of Right Femoral Venous and Right Axillary Arterial Extracorporeal Membrane Oxygenator;  Surgeon: Toby Hernandez MD;  [facility]     TRANSPLANT LUNG RECIPIENT SINGLE X2 Bilateral 3/1/2018    Procedure: TRANSPLANT LUNG RECIPIENT SINGLE X2;  Median Sternotomy, Extracorporeal Membrane Oxygenator, bilateral sequential lung transplant;  Surgeon: Toby Hernandez MD;  [facility]           Family History:     Family History   Problem Relation Age of Onset     Hypertension Mother      Arthritis Mother      Pancreatic Cancer Father      Diabetes Father             Social  History:     Social History     Socioeconomic History     Marital status:      Spouse name: Not on file     Number of children: Not on file     Years of education: Not on file     Highest education level: Not on file   Occupational History     Not on file   Tobacco Use     Smoking status: Never     Smokeless tobacco: Never   Substance and Sexual Activity     Alcohol use: No     Alcohol/week: 1.0 standard drink of alcohol     Types: 1 Glasses of wine per week     Drug use: No     Sexual activity: Not on file   Other Topics Concern     Parent/sibling w/ CABG, MI or angioplasty before 65F 55M? No   Social History Narrative    3/6/2019 - Lives with . Has three daughters. Four grandchildren (two ). No pets. Travelled previously to [facility]. Has visited [place] several times.      Social Determinants of Health     Financial Resource Strain: Not on file   Food Insecurity: Not on file   Transportation Needs: Not on file   Physical Activity: Not on file   Stress: Not on file   Social Connections: Not on file   Intimate Partner Violence: Not on file   Housing Stability: Not on file            Medications:     Current Outpatient Medications   Medication     acetaminophen (TYLENOL) 325 MG tablet     albuterol (PROVENTIL) (2.5 MG/3ML) 0.083% neb solution     azaTHIOprine (IMURAN) 50 MG tablet     azithromycin (ZITHROMAX) 250 MG tablet     budesonide (PULMICORT) 0.5 MG/2ML neb solution     calcium acetate (PHOSLO) 667 MG CAPS capsule     fluticasone-salmeterol (ADVAIR) 250-50 MCG/ACT inhaler     Magnesium Cl-Calcium Carbonate (SLOW-MAG) 71.5-119 MG TBEC     metoprolol tartrate (LOPRESSOR) 25 MG tablet     minocycline (DYNACIN) 100 MG tablet     montelukast (SINGULAIR) 10 MG tablet     multivitamin RENAL (RENAVITE RX/NEPHROVITE) 1 tablet tablet     pantoprazole (PROTONIX) 40 MG EC tablet     posaconazole (NOXAFIL) 100 MG EC tablet     predniSONE (DELTASONE) 5 MG tablet     sulfamethoxazole-trimethoprim  "(BACTRIM) 400-80 MG tablet     tacrolimus (GENERIC EQUIVALENT) 0.5 MG capsule     tacrolimus (GENERIC EQUIVALENT) 1 MG capsule     No current facility-administered medications for this visit.            Physical Exam:   BP (!) 142/88   Pulse 90   Resp 17   Ht 1.626 m (5' 4\")   Wt 61.1 kg (134 lb 11.2 oz)   LMP 06/07/2014 (Exact Date)   SpO2 95%   BMI 23.12 kg/m      GENERAL: alert, NAD  HEENT: NCAT, EOMI, no scleral icterus, oral mucosa moist and without lesions  Neck: no cervical or supraclavicular adenopathy  Lungs: moderate airflow, scattered expiratory wheezing, few coarse BS  CV: irregular rhythm, S1S2, no murmurs noted  Abdomen: normoactive BS, soft  Lymph: no edema  Neuro: AAO X 3, CN 2-12 grossly intact  Psychiatric: normal affect, good eye contact  Skin: scattered lesions on her face         Data:   All laboratory and imaging data reviewed.      Recent Results (from the past 168 hour(s))   Basic metabolic panel    Collection Time: 09/07/23  7:10 AM   Result Value Ref Range    Sodium 139 136 - 145 mmol/L    Potassium 4.5 3.4 - 5.3 mmol/L    Chloride 97 (L) 98 - 107 mmol/L    Carbon Dioxide (CO2) 32 (H) 22 - 29 mmol/L    Anion Gap 10 7 - 15 mmol/L    Urea Nitrogen 22.2 8.0 - 23.0 mg/dL    Creatinine 3.79 (H) 0.51 - 0.95 mg/dL    Calcium 9.4 8.8 - 10.2 mg/dL    Glucose 156 (H) 70 - 99 mg/dL    GFR Estimate 13 (L) >60 mL/min/1.73m2   Magnesium    Collection Time: 09/07/23  7:10 AM   Result Value Ref Range    Magnesium 1.8 1.7 - 2.3 mg/dL   CBC with platelets    Collection Time: 09/07/23  7:10 AM   Result Value Ref Range    WBC Count 5.3 4.0 - 11.0 10e3/uL    RBC Count 3.28 (L) 3.80 - 5.20 10e6/uL    Hemoglobin 10.7 (L) 11.7 - 15.7 g/dL    Hematocrit 34.5 (L) 35.0 - 47.0 %     (H) 78 - 100 fL    MCH 32.6 26.5 - 33.0 pg    MCHC 31.0 (L) 31.5 - 36.5 g/dL    RDW 17.4 (H) 10.0 - 15.0 %    Platelet Count 187 150 - 450 10e3/uL   General PFT Lab (Please always keep checked)    Collection Time: 09/07/23  " 8:16 AM   Result Value Ref Range    FVC-Pred 2.91 L    FVC-Pre 1.25 L    FVC-%Pred-Pre 42 %    FEV1-Pre 1.07 L    FEV1-%Pred-Pre 46 %    FEV1FVC-Pred 80 %    FEV1FVC-Pre 86 %    FEFMax-Pred 6.29 L/sec    FEFMax-Pre 3.09 L/sec    FEFMax-%Pred-Pre 49 %    FEF2575-Pred 2.16 L/sec    FEF2575-Pre 1.10 L/sec    LJA1095-%Pred-Pre 50 %    ExpTime-Pre 5.00 sec    FIFMax-Pre 2.66 L/sec    FEV1FEV6-Pred 81 %    FEV1FEV6-Pre 86 %     PFT interpretation:  Maneuver: valid and met ATS guidelines  Moderate obstruction based on Z score  Compared to prior: FEV1 of 1.07 is 50 ml below prior  Decrease in FVC may be related to obstruction vs restriction; lung volumes would be necessary to determine      Again, thank you for allowing me to participate in the care of your patient.        Sincerely,        Ame Chow PA-C

## 2023-09-07 NOTE — LETTER
PHYSICIAN ORDERS      DATE & TIME ISSUED: 2023 3:00 PM  PATIENT NAME: Kecia Blue   : 1960     [facility] MR# [if applicable]: [id number]     DIAGNOSIS:  Lung Transplant  Z94.2    Patient needs a 12 lead EKG with read on approximately 23.     Any questions please call: Mikayla     Please fax these results to [phone].        Ame Chow PA-C

## 2023-09-07 NOTE — TELEPHONE ENCOUNTER
QTc 450 on 9/7/23. Per akbar Mccloud to start azithromycin. Repeat EKG in 1 week.     PFT order faxed to [facility].     Pt would prefer to go to an infusion center for IV abx if needed, dose of Avycaz would be q48 hours. Will plan to check in with patient on Monday.     Tacrolimus level 5.8 at 12 hours, on 9/7/23.  Goal 8-10.   Current dose 1 mg in AM, 0.5 mg in PM    Dose changed to 1 mg in AM, 1 mg in PM   Recheck level in 7-10 days.     Discussed with patient. Consider suspension if this dose change makes her level too high.

## 2023-09-07 NOTE — PROGRESS NOTES
Therapy: IV ABX (Avycaz)  Insurance: Self-Pay     Medicare does not cover IV ABX in the home, and the patient would be self-pay for home infusion.  The patient should have coverage in a TCU or Infusion center.    Per Rona ARROYO [facility]: I would recommend Avycaz 0.94 g IV Q48 hours. Dose after dialysis on dialysis days.    This medication will require a prior auth through the patients Part-D plan.   So, we are not able to determine what the copay would be through the Part-D.    This self-pay quote is with the [facility] 55% discount.  The daily estimate is $192.45 for medication and supplies.   For nursing, patient should have coverage if homebound.   However, [facility] is not contracted with Medicare and an outside nursing agency would be utilized instead. If patient is not homebound, there is no coverage and [facility] can see patient if patient agrees to self-pay for $90 per visit.    Please contact Intake with any questions, [phone] or In Basket pool, [facility] ([phone]).

## 2023-09-08 NOTE — RESULT ENCOUNTER NOTE
on 9/7/2023.  We will have patient repeat when she rechecks a tacrolimus level on 9/18/2023.  Order faxed to her local lab.

## 2023-09-11 NOTE — TELEPHONE ENCOUNTER
Called patient to check in, she reports her fatigue is better, less tired. Cough is a little improved, mostly coughing during the morning and less often during the day. Will notify Ame.     She will start the CLAD meds tomorrow.

## 2023-09-12 NOTE — TELEPHONE ENCOUNTER
Per Ame, since pt is feeling better, finish minocycline course, repeat PFTs, then determine plan if IV abx are needed or not.

## 2023-09-13 NOTE — TELEPHONE ENCOUNTER
----- Message from Lewis Patel MD sent at 9/11/2023  6:22 PM CDT -----  Regarding: RE: PROLIA  Naomi, please see question below. Please check with patient if she has started prolia treatment. Thanks, Lewis Patel MD    ----- Message -----  From: Gianna Hernandez  Sent: 9/8/2023  10:16 AM CDT  To: [facility]; Lewis Patel MD  Subject: PROLIA                                           Hello,    Orders have come to the [facility] infusion work queue - I am reaching out for clarity on these orders:    Date ordered : 4/24/23       Type of Infusion: PROLIA    #If this is being done at outside of [place] or through [facility] please remove the Infusion Appointment Request from therapy plan. Please discontinue therapy plan if infusion is not needed.     Preferred infusion location (if applicable):    Is patient aware they need infusion Y/N:    When does it need to be done/does to be scheduled?             It looks like Prolia was ordered but has not been completed. Will she be doing this closer to home, or should we coordinate with other visits she has later in the year?       Thank you,  Gianna STERN [facility] - [facility]nd [facility]  [facility]  [phone] (option 3, then option 2)

## 2023-09-13 NOTE — TELEPHONE ENCOUNTER
Writer called patient to review. Patient states that she does plan to have the Prolia completed at an outside facility but wants to finish her antibiotics for current infection. Patient states that it is not related to her teeth. Patient does not currently have the information on where orders need to be faxed so patient was going to call back with details when she is ready to have orders sent.      Will update Dr. Patel.      Naomi Wayne, RN  Endocrine Care Coordinator  [facility]

## 2023-09-18 NOTE — TELEPHONE ENCOUNTER
Patient started CLAD meds on Tuesday last week. Finished minocycline either Thursday or Friday. Over the weekend, started feeling a little more SOB and more cough. Able to do ADLs, but having to stop to catch her earlier than she usually does when going around her house. Noticing some wheezing. Today, feels better. Oxygen sats 94-95%. Will notify Ame. Pt is getting PFTs tomorrow afternoon.

## 2023-09-21 NOTE — TELEPHONE ENCOUNTER
Called pt to touch base on symptoms.  She states that she's feeling better since this past weekend.  Endorses continued cough in AM with clear sputum production.  Shortness of breath is improving.  No wheeze noted since last weekend.  O2 sats 95-96%.  Provider updated.

## 2023-09-22 NOTE — TELEPHONE ENCOUNTER
LM with patient that IP is wanting to do an OR bronch so they will contact her to schedule. No plan for IV abx currently as patient is feeling better. Requested a call back with any questions.

## 2023-09-28 NOTE — LETTER
PHYSICIAN ORDERS      DATE & TIME ISSUED: 2023 2:58 PM  PATIENT NAME: Kecia Blue   : 1960     [facility] MR# [if applicable]: [id number]     DIAGNOSIS:  Lung Transplant  Z94.2    Pt needs the following testing early in the week of 10/2/23:    PFTs (spirometry only)  2 view chest x-ray  Labs: BMP, magnesium, CBC, tacrolimus level, CMV DNA quant    Any questions please call: Mikayla [phone]    Please fax these results to [phone].        Ame Chow PA-C

## 2023-09-28 NOTE — TELEPHONE ENCOUNTER
Patient Call: General  Route to LPN    Reason for call: Kecia is needing to change her appointment for 10/5/23 and would like for Coordinator to give her a call back.    Call back needed? Yes    Return Call Needed  Same as documented in contacts section  When to return call?: Same day: Route High Priority

## 2023-09-28 NOTE — TELEPHONE ENCOUNTER
Pt is not wanting to make two trips to [facility] for pre op and bronch. Spoke with akbar Bose for pre op with us to be virtual. Orders for testing faxed to patient's local clinic.

## 2023-10-02 PROBLEM — M85.89 OSTEOPENIA OF MULTIPLE SITES: Status: ACTIVE | Noted: 2017-11-29

## 2023-10-02 PROBLEM — M06.00 SERONEGATIVE RHEUMATOID ARTHRITIS (H): Status: ACTIVE | Noted: 2017-11-29

## 2023-10-02 PROBLEM — L94.4 GOTTRON'S PAPULES: Status: ACTIVE | Noted: 2017-11-29

## 2023-10-02 PROBLEM — M17.12 PRIMARY OSTEOARTHRITIS OF LEFT KNEE: Status: ACTIVE | Noted: 2022-08-02

## 2023-10-02 PROBLEM — R79.0 LOW MAGNESIUM LEVEL: Status: ACTIVE | Noted: 2018-05-18

## 2023-10-02 PROBLEM — R76.8 OTHER SPECIFIED ABNORMAL IMMUNOLOGICAL FINDINGS IN SERUM: Status: ACTIVE | Noted: 2018-04-09

## 2023-10-02 PROBLEM — N25.81 SECONDARY HYPERPARATHYROIDISM OF RENAL ORIGIN (H): Status: ACTIVE | Noted: 2021-09-01

## 2023-10-02 PROBLEM — M85.89 OTHER SPECIFIED DISORDERS OF BONE DENSITY AND STRUCTURE, MULTIPLE SITES: Status: ACTIVE | Noted: 2017-11-29

## 2023-10-02 PROBLEM — D50.8 IRON DEFICIENCY ANEMIA SECONDARY TO INADEQUATE DIETARY IRON INTAKE: Status: ACTIVE | Noted: 2019-10-31

## 2023-10-02 PROBLEM — R76.8 CMV (CYTOMEGALOVIRUS) ANTIBODY POSITIVE: Status: ACTIVE | Noted: 2018-04-09

## 2023-10-02 PROBLEM — T82.898A OTHER SPECIFIED COMPLICATION OF VASCULAR PROSTHETIC DEVICES, IMPLANTS AND GRAFTS, INITIAL ENCOUNTER (H): Status: ACTIVE | Noted: 2020-11-13

## 2023-10-02 PROBLEM — Z79.899 HIGH RISK MEDICATIONS (NOT ANTICOAGULANTS) LONG-TERM USE: Status: ACTIVE | Noted: 2017-11-29

## 2023-10-02 PROBLEM — K21.9 GERD (GASTROESOPHAGEAL REFLUX DISEASE): Status: ACTIVE | Noted: 2018-05-18

## 2023-10-02 PROBLEM — M17.11 PRIMARY OSTEOARTHRITIS OF RIGHT KNEE: Status: ACTIVE | Noted: 2023-01-11

## 2023-10-02 PROBLEM — D63.1 ANEMIA IN CHRONIC KIDNEY DISEASE: Status: ACTIVE | Noted: 2019-10-30

## 2023-10-02 PROBLEM — J98.09: Status: ACTIVE | Noted: 2020-09-16

## 2023-10-02 PROBLEM — Z99.2 DEPENDENCE ON RENAL DIALYSIS (H): Status: ACTIVE | Noted: 2020-09-23

## 2023-10-02 PROBLEM — N18.9 ANEMIA IN CHRONIC KIDNEY DISEASE: Status: ACTIVE | Noted: 2019-10-30

## 2023-10-02 PROBLEM — N18.9 CHRONIC KIDNEY DISEASE, UNSPECIFIED: Status: ACTIVE | Noted: 2019-10-30

## 2023-10-02 PROBLEM — T82.898A: Status: ACTIVE | Noted: 2020-11-13

## 2023-10-02 PROBLEM — J84.9 PULMONARY HYPERTENSION DUE TO INTERSTITIAL LUNG DISEASE (H): Status: ACTIVE | Noted: 2017-03-31

## 2023-10-02 PROBLEM — R79.0 ABNORMAL LEVEL OF BLOOD MINERAL: Status: ACTIVE | Noted: 2018-05-18

## 2023-10-02 PROBLEM — I27.23 PULMONARY HYPERTENSION DUE TO INTERSTITIAL LUNG DISEASE (H): Status: ACTIVE | Noted: 2017-03-31

## 2023-10-02 PROBLEM — Z96.651 PRESENCE OF RIGHT ARTIFICIAL KNEE JOINT: Status: ACTIVE | Noted: 2022-10-20

## 2023-10-02 PROBLEM — N18.6 END STAGE RENAL DISEASE (H): Status: ACTIVE | Noted: 2020-03-24

## 2023-10-02 PROBLEM — G62.9 PERIPHERAL NEUROPATHY: Status: ACTIVE | Noted: 2020-03-24

## 2023-10-02 PROBLEM — B25.9: Status: ACTIVE | Noted: 2018-08-01

## 2023-10-02 PROBLEM — Z79.52 CURRENT USE OF STEROID MEDICATION: Status: ACTIVE | Noted: 2017-11-29

## 2023-10-02 PROBLEM — J98.09 OTHER DISEASES OF BRONCHUS, NOT ELSEWHERE CLASSIFIED: Status: ACTIVE | Noted: 2020-09-16

## 2023-10-02 PROBLEM — Z79.2 LONG TERM (CURRENT) USE OF ANTIBIOTICS: Status: ACTIVE | Noted: 2019-09-13

## 2023-10-02 PROBLEM — Z96.659 HISTORY OF TOTAL KNEE REPLACEMENT: Status: ACTIVE | Noted: 2022-10-18

## 2023-10-02 PROBLEM — E88.09 OTHER DISORDERS OF PLASMA-PROTEIN METABOLISM, NOT ELSEWHERE CLASSIFIED: Status: ACTIVE | Noted: 2022-02-22

## 2023-10-02 PROBLEM — I27.20 PULMONARY HYPERTENSION (H): Status: ACTIVE | Noted: 2017-11-29

## 2023-10-02 PROBLEM — N17.9 AKI (ACUTE KIDNEY INJURY) (H): Status: ACTIVE | Noted: 2018-08-01

## 2023-10-02 NOTE — TELEPHONE ENCOUNTER
Pt calls reporting local clinic doesn't have orders for lab, xray, or PFT for this week. Refaxed orders, she will call later to schedule. Imuran refill sent.

## 2023-10-02 NOTE — TELEPHONE ENCOUNTER
Patient Call: General  Route to LPN    Reason for call: Felicia from [facility] called about some orders that were sent to them at 8:30am today. They are needing some clarifications.     Call back needed? Yes    Return Call Needed  Same as documented in contacts section  When to return call?: Same day: Route High Priority

## 2023-10-04 NOTE — PROGRESS NOTES
Kecia is a 62 year old female who is being evaluated via a billable video visit. 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 She recently underwent bronch and grew out steno on cultures and has completed levaquin and Bactrim without improvement. Currently, she is scheduled for a bronch in the OR on 10/17 for recurrent right stenosis and this is a pre op visit.       1. Bilateral lung transplant: ongoing cough, mainly in the am and intermittently productive and dyspnea with exertion, unchanged despite courses of antibiotics. No vitals today. DSA 8/8 negative. S/p bronch on 8/17 + for steno s/p bactrim (prior course of levaquin). CXR at OSH with read noting improvement in opacities (but comparison is 2021); PFT scheduled for tomorrow.   - Continue AZA 75 mg daily, tacrolimus (goal 8-10) and prednisone, decreased to 5 mg daily for significant osteoporosis  - On Bactrim 3 times/week  - CLAD therapy: azithromycin, singular and advair     2. EBV viremia: last level of 42K (log 4.6) on 8/8.   - Will try and add on EBV to labs from yesterday.     3. Right main bronchial stenosis s/p ligation: with multiple bronchs in the OR, last included debulking/dilation of the RERE. Most recent bronch on 8/17 demonstrates 50% stenosis, discussed with IP.  - Continue albuterol TID; start 10% Mucomyst BID to help with congestion/secretions  - Scheduled for OR on 10/17, messaged IP to send BAL fluid  - PFTs on 10/20      4. Left-sided aspergillus empyema: noted in 2019 s/p needle aspiration with Aspergillus fumigatus on cultures s/p intrapleural amphotericin bead placement. Posaconazole indefinitely per ID.  - Continue posaconazole     5. ESRD on iHD  (since 10/2019):  HTN: continues on iHD M/W/F. Renal transplant listing on hold currently.   - Continue metoprolol, renal MVI, phosphate binder     6. Osteoporosis: on DEXA from 2022 with significant loss of bone in her lumbar spine and femur. Complicated by steroid use and fact that her activity was severely limited by her knee pain. Vitamin D level of 40. Following with Endocrine, supposed to get Prolia injection.   - Continue calcium/vitamin D  - Decreased prednisone dose per above    7. + CMV: level of 521 on 9/7. Did not get labs until yesterday.  - CMV pending     Chronic issues:  1. Paroxysmal AFib  2. Hypomagnesemia  3. Dermatomyositis with Raynauds  4. Congestive hepatopathy with fibrosis     RTC: TBD bronch and PFTs  Vaccinations: will need influenza, covid and RSV vaccine  Preventative: colonoscopy due 2033; mammogram in February negative; DEXA > May 2024; following with Alexia Chow PA-C  Pulmonary, Allergy, Critical Care and Sleep Medicine    Interval history:  Since her last visit, patient is still coughing and short of breath with exertion. The cough is the same, the shortness of breath is worse. No fever or chills, no sinus congestion or runny nose. Cough is intermittently productive, mainly in the am, occasionally during the day and at night. No shortness of breath at rest. No chest pain or palpitations. Activity is limited unless she is sitting or laying, doing things in small spurts. No new GI issues. Normal stools.     No vitals yet today; O2 sats have been between 91-93%.     GENERAL: Healthy, alert and no distress  EYES: Eyes grossly normal to inspection.  No discharge or erythema, or obvious scleral/conjunctival abnormalities.  RESP: No audible wheeze, cough, or visible cyanosis.  No visible retractions or increased work of breathing.    SKIN: Visible skin clear. No significant rash, abnormal pigmentation or lesions.  NEURO: Cranial nerves grossly intact.  Mentation and speech  appropriate for age.  PSYCH: Mentation appears normal, affect normal/bright, judgement and insight intact, normal speech and appearance well-groomed.     How would you like to obtain your AVS? [employer]  If the video visit is dropped, the invitation should be resent by: Other e-mail: Personal email  Will anyone else be joining your video visit? No      Video-Visit Details    Type of service:  Video Visit   Video Start Time:  8:15  Video End Time: 8:44 AM    Originating Location (pt. Location): Home    Distant Location (provider location):  On-site  Platform used for Video Visit: [employer]

## 2023-10-04 NOTE — TELEPHONE ENCOUNTER
DATE:  10/4/2023     TIME OF RECEIPT FROM LAB:  9:20    ORDERING PROVIDER: Kamilla    LAB TEST:  cr    LAB VALUE:  5.4    RESULTS GIVEN WITH READ-BACK TO (PROVIDER):  Airam madrigal    TIME LAB VALUE REPORTED TO PROVIDER:   9:21

## 2023-10-05 NOTE — LETTER
PHYSICIAN ORDERS      DATE & TIME ISSUED: 2023 11:33 AM  PATIENT NAME: Kecia Blue   : 1960     [facility] MR# [if applicable]: [id number]     DIAGNOSIS:  Lung Transplant  Z94.2    Patient needs PFTs (spirometry only) on 10/20/23 (or as close to this date as possible)    Any questions please call: Mikayla     Please fax these results to [phone].        Ame Chwo PA-C

## 2023-10-05 NOTE — PATIENT INSTRUCTIONS
Patient Instructions  1. Continue to hydrate with 60-70 oz fluids daily.  2. Continue to exercise daily or most days of the week.  3. Follow up with your primary care provider for annual gender health maintenance procedures.  4. Follow up with colonoscopy schedule.  5. Follow up with annual dermatology visits.  6. Get flu, COVID and RSV vaccines when you can.   7. We'll plan to do PFTs around 10/20 after your bronch.   8. Do your albuterol nebs 3x/day. Start Mucomyst nebs with the albuterol nebs twice daily.     Next transplant clinic appointment: TBD with CXR, labs and PFTs  Next lab draw: 1 week

## 2023-10-05 NOTE — LETTER
PHYSICIAN ORDERS      DATE & TIME ISSUED: 2023 11:27 AM  PATIENT NAME: Kecia Blue   : 1960     [facility] MR# [if applicable]: [id number]     DIAGNOSIS:  Lung Transplant  Z94.2    Patient needs a non-contrast chest CT asap. Please contact her to schedule. Push images in PACS to [facility] once completed. Thanks!    Any questions please call: Mikayla     Please fax these results to [phone].        Ame Chow PA-C

## 2023-10-05 NOTE — NURSING NOTE
Transplant Coordinator Note     Reason for visit: Post lung transplant follow up visit   Coordinator: Present   Caregiver: Not present     Health concerns addressed today:  1. Resp: still coughing and SOB with exertion. Coughing up some sputum. Will get BAL with bronch.   3. Lab and xray yesterday, PFT tomorrow.   4.     Activity/rehab: Walking everyday for 5-10 minutes.   Oxygen needs: Room air  Pain management/RX: Denies  High risk donor: Yes  CMV status: D+/R+  DVT/PE: H/o DVT  Post op AFIB/follow up with EP: One time occurrence of a-fib  PJP prophylactic: Bactrim     COVID:  COVID-19 infection (yes/no, date of most recent positive test):   Status/instructions given about COVID-19 vaccine:      Pt Education: medications (use/dose/side effects), how/when to call coordinator, frequency of labs, s/s of infection/rejection, call prior to starting any new medications, lab/vital sign book     Health Maintenance:   Last colonoscopy: 7/25/23  Next colonoscopy due: 10 years   Dermatology:   Vaccinations this visit: None    Labs, CXR, PFTs reviewed with patient  Medication record reviewed and reconciled  Questions and concerns addressed    Patient Instructions  1. Continue to hydrate with 60-70 oz fluids daily.  2. Continue to exercise daily or most days of the week.  3. Follow up with your primary care provider for annual gender health maintenance procedures.  4. Follow up with colonoscopy schedule.  5. Follow up with annual dermatology visits.  6. Get flu, COVID and RSV vaccines when you can.   7. We'll plan to do PFTs around 10/20 after your bronch.   8. Do your albuterol nebs 3x/day. Start Mucomyst nebs with the albuterol nebs twice daily.     Next transplant clinic appointment: TBD with CXR, labs and PFTs  Next lab draw: 1 week    AVS printed at time of check out

## 2023-10-05 NOTE — LETTER
PHYSICIAN ORDERS      DATE & TIME ISSUED: 2023 12:36 PM  PATIENT NAME: Kecia Blue   : 1960     [facility] MR# [if applicable]: [id number]     DIAGNOSIS:  Lung Transplant  Z94.2    Please add on EBV DNA quant to labs from 10/4/23 if possible. If not able to add on, please draw this plus a CBC with diff week of 10/9/23. Thanks!    Any questions please call: Mikayla     Please fax these results to [phone].        Ame Chow PA-C

## 2023-10-05 NOTE — TELEPHONE ENCOUNTER
Chest x-ray from 10/4/23 reviewed by Ame, worsening infiltrates. Plan to get a non contrast chest CT asap. Order sent to [facility]. Pt notified who agrees with plan.     PFT order for 10/20/23 faxed as well.

## 2023-10-06 NOTE — TELEPHONE ENCOUNTER
Patient Call: General    Reason for call: patient called stated she got the VM and all is well.    Call back needed? If needed

## 2023-10-06 NOTE — TELEPHONE ENCOUNTER
Messaged patient to review recent elevated blood pressure reading.  Per [place] Community [place] guidelines, patients blood pressure is out of parameters and recheck blood pressure is recommended.    Recent Blood Pressure: 135/67  Date: 10/6/23    Patient reported blood pressure updated in Epic. Blood pressure falls within [place] Community [place] guidelines.  Patient will follow up as previously advised.

## 2023-10-06 NOTE — TELEPHONE ENCOUNTER
CMV 2450 on 10/4/23. Plan to start treatment dose Valcyte which is 450 mg thrice weekly given after dialysis per protocol. Plan for weekly labs including CBC with diff and CMV DNA quant.     CT order received, Esther will be calling her today to schedule.     Tacrolimus level 15.7 at 12 hours, on 10/4/23.  Goal 8-10.   Current dose 1 mg in AM, 1 mg in PM    Dose changed to 0.5 mg in AM, 0.5 mg in PM   Recheck level next week.     Left detailed message with patient, requested call back to confirm plan.

## 2023-10-06 NOTE — LETTER
PHYSICIAN ORDERS      DATE & TIME ISSUED: 2023 8:36 AM  PATIENT NAME: Kecia Blue   : 1960     [facility] MR# [if applicable]: [id number]     DIAGNOSIS:  Lung Transplant  Z94.2    Patient needs the following labs weekly x2 months:  BMP  Magnesium  CBC with diff  CMV DNA quant    Please do a Tacrolimus level week of 10/9/23, then every other week after that x2 months.     Any questions please call: Mikayla     Please fax these results to [phone].        Ame Chow PA-C

## 2023-10-12 NOTE — TELEPHONE ENCOUNTER
CMV 4680 on 10/11/23. Pt reports she just started Valcyte about 2 days ago due to pharmacy having to order the medication. Will await level next week. Received images from chest CT from 10/11/23, awaiting report to be faxed. Notified Ame that images in PACS.

## 2023-10-13 NOTE — TELEPHONE ENCOUNTER
Tacrolimus level 15.2 at 10.5 hours, on 10/11/23.  Goal 8-10.   Current dose 0.5 mg in AM, 0.5 mg in PM    Dose changed to 0.4 mg in AM, 0.4 mg in PM   Recheck level next week.     Discussed with patient. Script sent to local pharmacy for suspension so she will make the switch as soon as she can get the medication.     Labs for next week ordered to be done when patient is in pre op for bronch on Tuesday. IP fellow notified.

## 2023-10-16 NOTE — ANESTHESIA PREPROCEDURE EVALUATION
Anesthesia Pre-Procedure Evaluation    Patient: Kecia Blue   MRN: [id number] : 1960        Preoperative Diagnosis: Lung replaced by transplant (H) [Z94.2]    Procedure :           Past Medical History:   Diagnosis Date    Acute on chronic respiratory failure with hypoxia (H) 2018    Anisocoria     Antisynthetase syndrome (H)     Anxiety     Aspergillus (H)     Aspergillus pneumonia (H) 2020    Benign essential hypertension     C. difficile colitis     Cytomegalovirus (CMV) viremia (H)     Dermatomyositis (H)     Dysplasia of cervix, low grade (ESTRADA 1)     EBV (Franklin-Barr virus) viremia     ESRD (end stage renal disease) on dialysis (H)     ILD (interstitial lung disease) (H)     Lung replaced by transplant (H)     Osteopenia     Paroxysmal atrial fibrillation (H)     Pneumocystis jiroveci pneumonia (H)     PONV (postoperative nausea and vomiting)     Post-menopause     Pulmonary hypertension (H)     Raynaud's disease     Seronegative rheumatoid arthritis (H)       Past Surgical History:   Procedure Laterality Date    BRONCHOSCOPY (RIGID OR FLEXIBLE), DIAGNOSTIC N/A 4/10/2018    Procedure: COMBINED BRONCHOSCOPY (RIGID OR FLEXIBLE), LAVAGE;;  Surgeon: Mariposa Donohue MD;  Location: [facility]    BRONCHOSCOPY (RIGID OR FLEXIBLE), DIAGNOSTIC N/A 2020    Procedure: BRONCHOSCOPY, WITH BRONCHOALVEOLAR LAVAGE;  Surgeon: Uri Bass MD;  Location: [facility]    BRONCHOSCOPY (RIGID OR FLEXIBLE), DIAGNOSTIC N/A 2022    Procedure: BRONCHOSCOPY, WITH BRONCHOALVEOLAR LAVAGE;  Surgeon: Uri Bass MD;  Location: [facility]    BRONCHOSCOPY (RIGID OR FLEXIBLE), DIAGNOSTIC N/A 2023    Procedure: BRONCHOSCOPY, WITH BRONCHOALVEOLAR LAVAGE;  Surgeon: Esau Bruno MD;  Location: [facility]    BRONCHOSCOPY (RIGID OR FLEXIBLE), DILATE BRONCHUS / TRACHEA N/A 10/11/2018    Procedure: BRONCHOSCOPY (RIGID OR FLEXIBLE), DILATE BRONCHUS / TRACHEA;  Flexible And Rigid Bronchoscopy and  Dilation;  Surgeon: Wilber Lin MD;  [facility]    BRONCHOSCOPY FLEXIBLE N/A 3/13/2018    Procedure: BRONCHOSCOPY FLEXIBLE;  Flexible Bronchoscopy ;  Surgeon: Gissell Sanchez MD;  Location: [facility]    BRONCHOSCOPY FLEXIBLE N/A 5/9/2018    Procedure: BRONCHOSCOPY FLEXIBLE;;  Surgeon: Wilber Lin MD;  Location: [facility]    BRONCHOSCOPY FLEXIBLE AND RIGID N/A 9/10/2018    Procedure: BRONCHOSCOPY FLEXIBLE AND RIGID;  Flexible and Rigid Bronchoscopy with Balloon Dilation, tissue debulking with cryo, and Right mainstem bronchus stent placement;  Surgeon: Wilber Lin MD;  [facility]    BRONCHOSCOPY RIGID N/A 6/6/2018    Procedure: BRONCHOSCOPY RIGID;;  Surgeon: Lopez Macias MD;  [facility]: [facility]    BRONCHOSCOPY, DILATE BRONCHUS, STENT BRONCHUS, COMBINED N/A 6/11/2018    Procedure: COMBINED BRONCHOSCOPY, DILATE BRONCHUS, STENT BRONCHUS;  Flexible Bronchoscopy, Balloon Dilation, Bronchial Washings;  Surgeon: Wilber Lin MD;  [facility]    BRONCHOSCOPY, DILATE BRONCHUS, STENT BRONCHUS, COMBINED Right 7/10/2018    Procedure: COMBINED BRONCHOSCOPY, DILATE BRONCHUS, STENT BRONCHUS;  Flexible Bronchoscopy, Balloon Dilation, Bronchial Washings  ;  Surgeon: Wilber Lin MD;  [facility]    BRONCHOSCOPY, DILATE BRONCHUS, STENT BRONCHUS, COMBINED N/A 8/2/2018    Procedure: COMBINED BRONCHOSCOPY, DILATE BRONCHUS, STENT BRONCHUS;  Flexible Bronchoscopy, Bronchial Washings, Balloon Dilation;  Surgeon: Wilber Lin MD;  [facility]    BRONCHOSCOPY, DILATE BRONCHUS, STENT BRONCHUS, COMBINED N/A 8/20/2018    Procedure: COMBINED BRONCHOSCOPY, DILATE BRONCHUS, STENT BRONCHUS;  Flexible Bronchoscopy, Balloon Dilation;  Surgeon: Wilber Lin MD;  [facility]    BRONCHOSCOPY, DILATE BRONCHUS, STENT BRONCHUS, COMBINED N/A 10/29/2018    Procedure: Flexible Bronchoscopy, Balloon Dilation, Stent Revision, Airway Examination And Therapeutic  Suctioning, Cyro Tumor Debulking;  Surgeon: Wilber Lin MD;  [facility]    BRONCHOSCOPY, DILATE BRONCHUS, STENT BRONCHUS, COMBINED N/A 11/20/2018    Procedure: Rigid Bronchoscopy, Stent Removal and dilitation;  Surgeon: Wilber Lin MD;  [facility]    BRONCHOSCOPY, DILATE BRONCHUS, STENT BRONCHUS, COMBINED N/A 12/14/2018    Procedure: Flexible And Rigid Bronchoscopy, Balloon Dilation, Bronchial Washing;  Surgeon: Wilber Lin MD;  [facility]    BRONCHOSCOPY, DILATE BRONCHUS, STENT BRONCHUS, COMBINED N/A 1/17/2019    Procedure: Flexible And Rigid Bronchoscopy, Balloon Dilation.;  Surgeon: Wilber Lin MD;  [facility]    BRONCHOSCOPY, DILATE BRONCHUS, STENT BRONCHUS, COMBINED N/A 3/7/2019    Procedure: Flexible and Rigid Bronchoscopy, Bronchial Washing, Balloon Dilation;  Surgeon: Wilber Lin MD;  [facility]    BRONCHOSCOPY, DILATE BRONCHUS, STENT BRONCHUS, COMBINED N/A 6/6/2019    Procedure: Rigid and Flexible Bronchoscopy, Balloon Dilation;  Surgeon: Wilber Lin MD;  [facility]    BRONCHOSCOPY, DILATE BRONCHUS, STENT BRONCHUS, COMBINED N/A 10/11/2019    Procedure: Flexible and Rigid Bronchoscopy, Balloon Dilation, Bronchoalveolar Lagave;  Surgeon: Wliber Lin MD;  [facility]    BRONCHOSCOPY, DILATE BRONCHUS, STENT BRONCHUS, COMBINED N/A 2/19/2021    Procedure: BRONCHOSCOPY, flexible, airway dilation, and bronchial wash;  Surgeon: Wilber Lin MD;  [facility]    BRONCHOSCOPY, DILATE BRONCHUS, STENT BRONCHUS, COMBINED N/A 4/9/2021    Procedure: BRONCHOSCOPY, flexible and rigid, Airway suctioning;  Surgeon: Mati Norris MD;  [facility]    CV RIGHT HEART CATH MEASUREMENTS RECORDED N/A 3/10/2020    Procedure: CV RIGHT HEART CATH;  Surgeon: Wai Garcia MD;  Location:  [facility]    ENT SURGERY      tonsillectomy as a child    ESOPHAGOSCOPY, GASTROSCOPY, DUODENOSCOPY  (EGD), COMBINED N/A 10/29/2018    Procedure: COMBINED ESOPHAGOSCOPY, GASTROSCOPY, DUODENOSCOPY (EGD) with biopsies and polypectomy;  Surgeon: Chente Bloom MD;  [facility]    INSERT EXTRACORPORAL MEMBRANE OXYGENATOR ADULT (OUTSIDE OR) N/A 2/27/2018    Procedure: INSERT EXTRACORPORAL MEMBRANE OXYGENATOR ADULT (OUTSIDE OR);  INSERT EXTRACORPORAL MEMBRANE OXYGENATOR ADULT (OUTSIDE OR) ;  Surgeon: Toby Hernandez MD;  [facility]    IR CVC TUNNEL PLACEMENT > 5 YRS OF AGE  10/25/2019    IR DIALYSIS FISTULOGRAM LEFT  3/2/2021    IR DIALYSIS MECH THROMB, PTA  3/2/2021    IR FLUORO 0-1 HOUR  5/7/2021    IR GASTRO JEJUNOSTOMY TUBE PLACEMENT  2/16/2021    IR PARACENTESIS  1/8/2020    IR THORACENTESIS  9/13/2019    IR TRANSCATHETER BIOPSY  1/8/2020    LASER CO2 BRONCHOSCOPY N/A 4/30/2021    Procedure: Flexible and Rigid Bronchoscopy and Tissue Debulking with CO2 Laser Assistance;  Surgeon: Mati Norris MD;  [facility]    LASER CO2 BRONCHOSCOPY N/A 6/11/2021    Procedure: BRONCHOSCOPY, Flexible and Rigid Bronchoscopy, Tissue Debulking with cryo Assistance, airway dilation,;  Surgeon: Mati Norris MD;  [facility]    LASER CO2 BRONCHOSCOPY N/A 9/16/2021    Procedure: BRONCHOSCOPY, flexible and rigid, CO2 Laser Debulking;  Surgeon: Mati Norris MD;  [facility]    LASER CO2 BRONCHOSCOPY N/A 2/11/2022    Procedure: flexible, rigid bronchoscopy, tissue debulking, airway dilation, co2 laser, bronchoalveolar lavage;  Surgeon: Juana Baugh MD;  [facility]    no prior surgery      REMOVE EXTRACORPORAL MEMBRANE OXYGENATOR ADULT N/A 3/3/2018    Procedure: REMOVE EXTRACORPORAL MEMBRANE OXYGENATOR ADULT;  Removal of Right Femoral Venous and Right Axillary Arterial Extracorporeal Membrane Oxygenator;  Surgeon: Toby Hernandez MD;  [facility]    TRANSPLANT LUNG RECIPIENT SINGLE X2 Bilateral 3/1/2018    Procedure: TRANSPLANT LUNG RECIPIENT SINGLE X2;  Median  Sternotomy, Extracorporeal Membrane Oxygenator, bilateral sequential lung transplant;  Surgeon: Toby Hernandez MD;  [facility]      No Known Allergies   Social History     Tobacco Use    Smoking status: Never    Smokeless tobacco: Never   Substance Use Topics    Alcohol use: No     Alcohol/week: 1.0 standard drink of alcohol     Types: 1 Glasses of wine per week      Wt Readings from Last 1 Encounters:   09/07/23 61.1 kg (134 lb 11.2 oz)        Anesthesia Evaluation   Pt has had prior anesthetic. Type: General, MAC and Regional.    History of anesthetic complications  - PONV.      ROS/MED HX  ENT/Pulmonary: Comment: Pt s/p bilateral lung transplant for interstitial lung disease, R bronchial mainstem stenosis requiring dilation/stent,   - PFTs (2/10): at patient baseline (FEV1/FVC 79% pred)        Neurologic:  - neg neurologic ROS     Cardiovascular: Comment: H/o DVT, paroxysmal Afib, SVT, now SR, pulm HTN, EF 60-65%    (+)  hypertension- -   -  - -                        dysrhythmias (Paroxysmal AFib),        pulmonary hypertension, Previous cardiac testing   Echo: Date: 7/2022 Results:  LV EF of 55-60%.  Mild mitral and tricuspid insufficiency is present.  Estimated pulmonary artery systolic pressure is 36 mmHg (upper limits of  normal).  Estimated mean right atrial pressure is normal.    Stress Test:  Date: 8/2023 Results:  The nuclear stress test is negative for inducible myocardial ischemia or infarction.     Hyperdynamic LV function with LLV EF over 75%.    ECG Reviewed:  Date: Results:    Cath:  Date: 3/10/2020 Results:    Right sided filling pressures are mildly elevated.    Mild elevated Pulmonary Hypertension.    Left sided filling pressures are mildly elevated.    Normal cardiac output level.        METS/Exercise Tolerance:  Comment: Dermatomyositis with Raynauds   Hematologic:  - neg hematologic  ROS     Musculoskeletal: Comment: Raynaud's, RA, antisynthetase syndrome  - Left knee pain       GI/Hepatic: Comment: - Congestive hepatopathy with fibrosis        Renal/Genitourinary:     (+) renal disease (HD MWF, Left-sided fistula), type: ESRD, Pt requires dialysis, type: Hemodialysis,          Endo:  - neg endo ROS     Psychiatric/Substance Use:     (+) psychiatric history anxiety       Infectious Disease: Comment: course complicated by right mainstem bronchial stenosis s/p several dilations, left-sided Aspergillus empyema s/p amphotericin beads, 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      Malignancy:  - neg malignancy ROS     Other:            Physical Exam    Airway        Mallampati: III   TM distance: > 3 FB   Neck ROM: full   Mouth opening: > 3 cm    Respiratory Devices and Support         Dental       (+) chipped    B=Bridge, C=Chipped, L=Loose, M=Missing    Cardiovascular          Rhythm and rate: regular and normal     Pulmonary   pulmonary exam normal        breath sounds clear to auscultation           OUTSIDE LABS:  CBC:   Lab Results   Component Value Date    WBC 5.3 09/07/2023    WBC 7.6 08/08/2023    HGB 10.7 (L) 09/07/2023    HGB 10.4 (L) 08/08/2023    HCT 34.5 (L) 09/07/2023    HCT 32.6 (L) 08/08/2023     09/07/2023     08/08/2023     BMP:   Lab Results   Component Value Date     09/07/2023     08/08/2023    POTASSIUM 4.5 09/07/2023    POTASSIUM 4.6 08/08/2023    CHLORIDE 100 10/11/2023    CHLORIDE 101 10/04/2023    CO2 32 (H) 09/07/2023    CO2 26 08/08/2023    BUN 22.2 09/07/2023    BUN 48.1 (H) 08/08/2023    CR 3.79 (H) 09/07/2023    CR 6.35 (H) 08/08/2023     (H) 09/07/2023     (H) 08/08/2023     COAGS:   Lab Results   Component Value Date    PTT 28 02/12/2021    INR 0.9 08/16/2023    INR 0.9 08/16/2023    FIBR 358 02/11/2021     POC:   Lab Results   Component Value Date    BGM 75 06/11/2021    HCG Negative 06/06/2019     HEPATIC:   Lab Results   Component Value Date    ALBUMIN 4.0 07/11/2023    ALBUMIN  3.9 07/11/2023    PROTTOTAL 7.9 07/11/2023    ALT 55 (H) 07/11/2023    AST 33 07/11/2023     (H) 11/11/2019    ALKPHOS 344 (H) 07/11/2023    BILITOTAL 0.5 07/11/2023    SALAS <10 (L) 01/24/2021     OTHER:   Lab Results   Component Value Date    PH 7.41 02/10/2021    LACT 1.5 09/23/2021    A1C 5.2 05/26/2022    CHELSEY 9.4 09/07/2023    PHOS 5.9 (H) 05/03/2021    MAG 1.8 09/07/2023    LIPASE 212 10/24/2019    AMYLASE 58 02/19/2018    TSH 1.23 07/11/2023    CRP 25.0 (H) 10/06/2019    SED 93 (H) 10/07/2019       Anesthesia Plan    ASA Status:  3    NPO Status:  NPO Appropriate    Anesthesia Type: General.   Induction: Intravenous.   Maintenance: TIVA.   Techniques and Equipment:     - Airway: Jet ventilation (Monsoon ventilation)     - Lines/Monitors: BIS     Consents    Anesthesia Plan(s) and associated risks, benefits, and realistic alternatives discussed. Questions answered and patient/representative(s) expressed understanding.     - Discussed:     - Discussed with:  Patient      - Extended Intubation/Ventilatory Support Discussed: No.      - Patient is DNR/DNI Status: No     Use of blood products discussed: No .     Postoperative Care    Pain management: Oral pain medications, IV analgesics.   PONV prophylaxis: Ondansetron (or other 5HT-3), Background Propofol Infusion, Dexamethasone or Solumedrol     Comments:                Fer Torres MD

## 2023-10-17 NOTE — ANESTHESIA CARE TRANSFER NOTE
Patient: Kecia Blue    Procedure: Procedure(s):  RIGID, flexible bronchoscopy with airway dilation       Diagnosis: Bronchial stenosis [J98.09]  Diagnosis Additional Information: No value filed.    Anesthesia Type:   General     Note:    Oropharynx: spontaneously breathing  Level of Consciousness: awake  Oxygen Supplementation: face mask  Level of Supplemental Oxygen (L/min / FiO2): 6  Independent Airway: airway patency satisfactory and stable  Dentition: dentition unchanged  Vital Signs Stable: post-procedure vital signs reviewed and stable  Report to RN Given: handoff report given  Patient transferred to: PACU    Handoff Report: Identifed the Patient, Identified the Reponsible Provider, Reviewed the pertinent medical history, Discussed the surgical course, Reviewed Intra-OP anesthesia mangement and issues during anesthesia, Set expectations for post-procedure period and Allowed opportunity for questions and acknowledgement of understanding    Vitals:  Vitals Value Taken Time   /72 10/17/23 1130   Temp     Pulse 84 10/17/23 1133   Resp 14 10/17/23 1133   SpO2 98 % 10/17/23 1133   Vitals shown include unfiled device data.    Electronically Signed By: Fer Torres MD  October 17, 2023  11:33 AM

## 2023-10-17 NOTE — DISCHARGE INSTRUCTIONS
Post Bronchoscopy Patient Instructions:    October 17, 2023  Kecia Blue    Your procedure completed (bronchoscopy with airway examination and balloon dilation right main airway) without any immediate complications.  You may cough up scant amount of blood for the next 12-24 hours. If you have excessive cough with blood, chest pain, shortness of breath, please report to the closest emergency room.    You may experience low grade (less than 100.5 F) fever next 24 hours, if so, you can take Tylenol. If the fever persists more than 24 hours, please contact to our office or your primary care provider.        You may resume your diet as it was prior to procedure.    You may resume your medications after the procedure unless you are instructed to do differently.     Please follow instructions from the nursing staff upon discharge in terms of activity. In general, you should avoid any attention or motor skill requiring activities (e.g., driving or operating any motorized vehicle) for 24 hours as you might be still under the effect of sedation medications. Please make sure an adult to accompany you next 24 hours.     Should you have any question, please do not hesitate to call our office.    Hayden Lou MD    [facility], [place]  Same-Day Surgery   Adult Discharge Orders & Instructions     For 24 hours after surgery    Get plenty of rest.  A responsible adult must stay with you for at least 24 hours after you leave the hospital.   Do not drive or use heavy equipment.  If you have weakness or tingling, don't drive or use heavy equipment until this feeling goes away.  Do not drink alcohol.  Avoid strenuous or risky activities.  Ask for help when climbing stairs.   You may feel lightheaded.  IF so, sit for a few minutes before standing.  Have someone help you get up.   If you have nausea (feel sick to your stomach): Drink only clear liquids such as apple juice, ginger ale, broth or 7-Up.   Rest may also help.  Be sure to drink enough fluids.  Move to a regular diet as you feel able.  You may have a slight fever. Call the doctor if your fever is over 100 F (37.7 C) (taken under the tongue) or lasts longer than 24 hours.  You may have a dry mouth, a sore throat, muscle aches or trouble sleeping.  These should go away after 24 hours.  Do not make important or legal decisions.   Call your doctor for any of the followin.  Signs of infection (fever, growing tenderness at the surgery site, a large amount of drainage or bleeding, severe pain, foul-smelling drainage, redness, swelling).    2. It has been over 8 to 10 hours since surgery and you are still not able to urinate (pass water).    3.  Headache for over 24 hours.    To contact a doctor, call [facility] @ [phone] (Mon-Fri, 8:00am-4:30pm)  or:    '   [phone] and ask for the resident on call for Pulmonology (answered 24 hours a day)  '   Emergency Department:    [facility]: [phone]       (TTY for hearing impaired: [phone])    [facility]: [phone]       (TTY for hearing impaired: [phone])

## 2023-10-17 NOTE — PROCEDURES
INTERVENTIONAL PULMONOLOGY       Procedure(s):    A flexible and rigid bronchoscopy   Airway exam  Balloon bronchoplasty without stent placement (1 site)   Bronchial washing (1 site)    Indication:  RMB stenosis    Attending of Record:  Preet Patel MD    Interventional Pulmonary Fellow   Hayden Ervin Present:   None     Medications:    General Anesthesia - See anesthesia flowsheet for details    Sedation Time:   Per Anesthesia Care Provider    Time Out:  Performed    The patient's medical record has been reviewed.  The indication for the procedure was reviewed.  The necessary history and physical examination was performed and reviewed.  The risks, benefits and alternatives of the procedure were discussed with the the patient in detail and she had the opportunity to ask questions.  I discussed in particular the potential complications including risks of minor or life-threatening bleeding and/or infection, respiratory failure, vocal cord trauma / paralysis, pneumothorax, and discomfort. Sedation risks were also discussed including abnormal heart rhythms, low blood pressure, and respiratory failure. All questions were answered to the best of my ability.  Verbal and written informed consent was obtained.  The proposed procedure and the patient's identification were verified prior to the procedure by the physician and the nurse.    The patient was assessed for the adequacy for the procedure and to receive medications.   Mental Status:  Alert and oriented x 3  Airway examination:  Class I (Complete visualization of soft palate)  Pulmonary:  Non labored respirations  CV:  Regular rate  ASA Grade:  (II)  Mild systemic disease    After clinical evaluation and reviewing the indication, risks, alternatives and benefits of the procedure the patient was deemed to be in satisfactory condition to undergo the procedure.           A Tuberculosis risk assessment was performed:  The patient has no known RISK of  Tuberculosis    The procedure was performed in a negative airflow room: The patient could not be moved to a negative airflow room because of needed OR for the procedure    Maneuvers / Procedure:      A Flexible and 12mm Rigid bronchoscope bronchoscope was used for the procedure. The rigid bronchoscope was inserted into the mouth. Uvula, epiglottis and vocal cords were seen. The scope was advanced turning the bevel to 90 degress while passing through the cords and into the trachea.     Airway Examination: A complete airway examination was performed from the distal trachea to the subsegmental level in each lobe of both lungs.  Pertinent findings include moderate degree RMB anastomotic stenosis, I was unable to pass therapeutic scope.       Airway dilation: Airway dilation#1: The Right mainstem  airway was dilated to 12mm . Each dilation was held for 60sec and repeated 2 times.            Bronchial washing: Right mainstem, RLL, and RUL was washed and sent for analysis.     Any disposable equipment was visually inspected and deemed to be intact immediately post procedure.      Relevant Pictures  Main gee        LMB anastomosis          RMB anastomosis        Balloon dilation of RMB anastomosis        RMB anastomosis after balloon dilation        Assessment and Recommendations:     Successful completion of rigid and flexible bronchoscopy with balloon dilation up to 12 mm, right bronchial washings  Ok to discharge once medically stable.   Follow up clinically          Hayden Lou  Interventional pulmonary fellow  Pager: [phone]

## 2023-10-17 NOTE — ANESTHESIA POSTPROCEDURE EVALUATION
Patient: Kecia Blue    Procedure: Procedure(s):  RIGID, flexible bronchoscopy with airway dilation       Anesthesia Type:  General    Note:  Disposition: Outpatient   Postop Pain Control: Uneventful            Sign Out: Well controlled pain   PONV: No   Neuro/Psych: Uneventful            Sign Out: Acceptable/Baseline neuro status   Airway/Respiratory: Uneventful            Sign Out: Acceptable/Baseline resp. status   CV/Hemodynamics: Uneventful            Sign Out: Acceptable CV status; No obvious hypovolemia; No obvious fluid overload   Other NRE: NONE   DID A NON-ROUTINE EVENT OCCUR? No           Last vitals:  Vitals Value Taken Time   /75 10/17/23 1215   Temp 36.6  C (97.8  F) 10/17/23 1200   Pulse 77 10/17/23 1219   Resp 28 10/17/23 1219   SpO2 95 % 10/17/23 1219   Vitals shown include unfiled device data.    Electronically Signed By: Farhad Freeman MD  October 17, 2023  12:20 PM

## 2023-10-18 NOTE — TELEPHONE ENCOUNTER
Tacrolimus level 3.5 at 14-15 hours?, on 10/17/23.  Goal 8-10.   Current dose 0.5 mg in AM, 0.5 mg in PM  Pt had not switched to tacrolimus suspension yet.  No missed doses or other medication changes.  Previous level 15.2 when pt was taking 1 mg BID.    Dose changed to 0.5 mg in AM, 1 mg in PM   Recheck level in 3-5 days.    Discussed with pt.

## 2023-10-20 NOTE — TELEPHONE ENCOUNTER
DATE:  10/20/2023     TIME OF RECEIPT FROM LAB:  10:15 AM    ORDERING PROVIDER:     LAB TEST:  Alk phos    LAB VALUE:  239    RESULTS GIVEN WITH READ-BACK TO (PROVIDER):  Airam Schuster RN    TIME LAB VALUE REPORTED TO PROVIDER:   10:29 AM

## 2023-10-20 NOTE — TELEPHONE ENCOUNTER
Pt has chronically elevated alkaline phosphatase.  Pt last saw hepatology locally in June 2023.  On chronic posaconazole.

## 2023-10-22 NOTE — PROGRESS NOTES
[facility]  October 24, 2023         Assessment and Plan:   Kecia Blue is a 62 year old female with h/o bilateral lung transplant on 3/1/18 for ILD for anti synthetase syndrome with postoperative course complicated by right mainstem bronchial stenosis s/p several dilations, left-sided Aspergillus empyema s/p amphotericin beads, currently on posaconazole indefinitely, EBV viremia, CMV viremia, C diff colitis and ESRD on HD. Symptomatic x 2 months, s/p bronch with BAL + for stento s/p levaquin and Bactrim without improvement. CT chest demonstrated recurrent stenosis and now is s/p OR bronch on 10/17 with right stenosis dilation. She felt well for ~~ 24 hours and symptoms have returned. BAL + for actinomyces, GGOs and nodules on OSH CT. Multiple other cultures pending. She also is undergoing treamtment for recurrent CMV.     1. Recurrent CMV viremia: peaked at 2680 on 10/11, down to 75 on 10/17.   - Continue renally dosed valgancyclovir  - CMV pending for today     2. Bilateral lung transplant:   Right mainstem bronchial stenosis: s/p dilation on 10/17. Aubrey well for about 24 hours and then all her prior symptoms returned including ongoing cough, mainly in the am and  dyspnea with exertion, now on 2L O2 continuous. Sating 93%. DSA 8/8 negative. CT chest 10/11 overread here with RML and KONRAD GGOs and nodules, BAL from 10/17 + for actinomyces, multiple cultures pending including AFB. CXR reviewed today and demonstrates stable transplant. PFTs are unchanged from pre dilation (FEV1 of 0.87 now and at OSH).  - DSA and Immunknow today  - CT chest with expiratory views to evaluation for recurrent stenosis, worsening infection  - Start Augmentin 500 mg, renally dosed until follow up ID scheduled for Monday, 10/30  - Continue AZA 75 mg daily, tacrolimus (goal 8-10) and prednisone 5 mg daily (decreased for significant osteoporosis)  - On Bactrim 3 times/week  - CLAD  therapy: azithromycin, singular and advair    3. Left-sided aspergillus empyema: noted in 2019 s/p needle aspiration with Aspergillus fumigatus on cultures s/p intrapleural amphotericin bead placement. Posaconazole indefinitely per ID.  - Continue posaconazole     4. ESRD on iHD (since 10/2019):  HTN: continues on iHD M/W/F. BP elevated today at 158/96. Renal transplant listing on hold currently for infection and worsening PFTs of unknown etiology  - Continue metoprolol, renal MVI, phosphate binder     5. Osteoporosis: on DEXA from 2022 with significant loss of bone in her lumbar spine and femur. Complicated by steroid use and fact that her activity was severely limited by her knee pain. Vitamin D level of 40. Following with Endocrine, supposed to get Prolia injection.   - Continue calcium/vitamin D  - Decreased prednisone dose per above     Chronic issues:  1. Paroxysmal AFib  2. Hypomagnesemia  3. Dermatomyositis with Raynauds  4. Congestive hepatopathy with fibrosis     RTC: TBD bronch and PFTs  Vaccinations: will need influenza, covid and RSV vaccine  Preventative: colonoscopy due 2033; mammogram in February negative; DEXA > May 2024; following with Alexia Chow PA-C  Pulmonary, Allergy, Critical Care and Sleep Medicine        Interval History:     Felt better for maybe 24 hours after dilation, then symptoms  started back up on Thursday. Started coughing with increased shortness of breath, overall felt unwell. Placed O2 back on last Thursday, went to HD on Friday and felt a little bit better, but decided to stay on the O2. Takes so much energy to get over the shortness of breath. Using 2L O2 continuous at home. No fever, or chills, no URI symptoms. Cough is more frequent overall, brings up mucous and then is better. Has been coughing 3-4 times/night other than last night. No tightness or wheezing. No chest pain or palpitations. No GI complaints. No pain.          Review of Systems:   Please see HPI,  otherwise the complete 10 point ROS is negative.           Past Medical and Surgical History:     Past Medical History:   Diagnosis Date    Acute on chronic respiratory failure with hypoxia (H) 02/21/2018    Anisocoria     Antisynthetase syndrome (H24) 2014    Anxiety     Aspergillus (H)     Aspergillus pneumonia (H) 11/20/2020    Benign essential hypertension     C. difficile colitis     Cytomegalovirus (CMV) viremia (H)     Dermatomyositis (H)     Dysplasia of cervix, low grade (ESTRADA 1)     EBV (Franklin-Barr virus) viremia     ESRD (end stage renal disease) on dialysis (H)     ILD (interstitial lung disease) (H)     Lung replaced by transplant (H)     Osteopenia     Paroxysmal atrial fibrillation (H)     Pneumocystis jiroveci pneumonia (H)     PONV (postoperative nausea and vomiting)     Post-menopause     Pulmonary hypertension (H)     Raynaud's disease     Seronegative rheumatoid arthritis (H)      Past Surgical History:   Procedure Laterality Date    BRONCHOSCOPY (RIGID OR FLEXIBLE), DIAGNOSTIC N/A 4/10/2018    Procedure: COMBINED BRONCHOSCOPY (RIGID OR FLEXIBLE), LAVAGE;;  Surgeon: Mariposa Donohue MD;  Location: [facility]    BRONCHOSCOPY (RIGID OR FLEXIBLE), DIAGNOSTIC N/A 12/23/2020    Procedure: BRONCHOSCOPY, WITH BRONCHOALVEOLAR LAVAGE;  Surgeon: Uri Bass MD;  [facility]: [facility]    BRONCHOSCOPY (RIGID OR FLEXIBLE), DIAGNOSTIC N/A 5/26/2022    Procedure: BRONCHOSCOPY, WITH BRONCHOALVEOLAR LAVAGE;  Surgeon: Uri Bass MD;  Location: [facility]    BRONCHOSCOPY (RIGID OR FLEXIBLE), DIAGNOSTIC N/A 8/17/2023    Procedure: BRONCHOSCOPY, WITH BRONCHOALVEOLAR LAVAGE;  Surgeon: Esau Bruno MD;  [facility]: [facility]    BRONCHOSCOPY (RIGID OR FLEXIBLE), DILATE BRONCHUS / TRACHEA N/A 10/11/2018    Procedure: BRONCHOSCOPY (RIGID OR FLEXIBLE), DILATE BRONCHUS / TRACHEA;  Flexible And Rigid Bronchoscopy and Dilation;  Surgeon: Wilber Lin MD;  [facility]    BRONCHOSCOPY FLEXIBLE N/A 3/13/2018     Procedure: BRONCHOSCOPY FLEXIBLE;  Flexible Bronchoscopy ;  Surgeon: Gissell Sanchez MD;  [facility]: [facility]    BRONCHOSCOPY FLEXIBLE N/A 5/9/2018    Procedure: BRONCHOSCOPY FLEXIBLE;;  Surgeon: Wilber Lin MD;  [facility]    BRONCHOSCOPY FLEXIBLE AND RIGID N/A 9/10/2018    Procedure: BRONCHOSCOPY FLEXIBLE AND RIGID;  Flexible and Rigid Bronchoscopy with Balloon Dilation, tissue debulking with cryo, and Right mainstem bronchus stent placement;  Surgeon: Wilber Lin MD;  [facility]    BRONCHOSCOPY RIGID N/A 6/6/2018    Procedure: BRONCHOSCOPY RIGID;;  Surgeon: Lopez Macias MD;  [facility]    BRONCHOSCOPY, DILATE BRONCHUS, STENT BRONCHUS, COMBINED N/A 6/11/2018    Procedure: COMBINED BRONCHOSCOPY, DILATE BRONCHUS, STENT BRONCHUS;  Flexible Bronchoscopy, Balloon Dilation, Bronchial Washings;  Surgeon: Wilber Lin MD;  [facility]    BRONCHOSCOPY, DILATE BRONCHUS, STENT BRONCHUS, COMBINED Right 7/10/2018    Procedure: COMBINED BRONCHOSCOPY, DILATE BRONCHUS, STENT BRONCHUS;  Flexible Bronchoscopy, Balloon Dilation, Bronchial Washings  ;  Surgeon: Wilber Lin MD;  [facility]    BRONCHOSCOPY, DILATE BRONCHUS, STENT BRONCHUS, COMBINED N/A 8/2/2018    Procedure: COMBINED BRONCHOSCOPY, DILATE BRONCHUS, STENT BRONCHUS;  Flexible Bronchoscopy, Bronchial Washings, Balloon Dilation;  Surgeon: Wilber Lin MD;  [facility]    BRONCHOSCOPY, DILATE BRONCHUS, STENT BRONCHUS, COMBINED N/A 8/20/2018    Procedure: COMBINED BRONCHOSCOPY, DILATE BRONCHUS, STENT BRONCHUS;  Flexible Bronchoscopy, Balloon Dilation;  Surgeon: Wilber Lin MD;  [facility]    BRONCHOSCOPY, DILATE BRONCHUS, STENT BRONCHUS, COMBINED N/A 10/29/2018    Procedure: Flexible Bronchoscopy, Balloon Dilation, Stent Revision, Airway Examination And Therapeutic Suctioning, Cyro Tumor Debulking;  Surgeon: Wilber Lin MD;  [facility]     BRONCHOSCOPY, DILATE BRONCHUS, STENT BRONCHUS, COMBINED N/A 11/20/2018    Procedure: Rigid Bronchoscopy, Stent Removal and dilitation;  Surgeon: Wilber Lin MD;  [facility]    BRONCHOSCOPY, DILATE BRONCHUS, STENT BRONCHUS, COMBINED N/A 12/14/2018    Procedure: Flexible And Rigid Bronchoscopy, Balloon Dilation, Bronchial Washing;  Surgeon: Wilber Lin MD;  [facility]    BRONCHOSCOPY, DILATE BRONCHUS, STENT BRONCHUS, COMBINED N/A 1/17/2019    Procedure: Flexible And Rigid Bronchoscopy, Balloon Dilation.;  Surgeon: Wilber Lin MD;  [facility]    BRONCHOSCOPY, DILATE BRONCHUS, STENT BRONCHUS, COMBINED N/A 3/7/2019    Procedure: Flexible and Rigid Bronchoscopy, Bronchial Washing, Balloon Dilation;  Surgeon: Wilber Lin MD;  [facility]    BRONCHOSCOPY, DILATE BRONCHUS, STENT BRONCHUS, COMBINED N/A 6/6/2019    Procedure: Rigid and Flexible Bronchoscopy, Balloon Dilation;  Surgeon: Wilber Lin MD;  [facility]    BRONCHOSCOPY, DILATE BRONCHUS, STENT BRONCHUS, COMBINED N/A 10/11/2019    Procedure: Flexible and Rigid Bronchoscopy, Balloon Dilation, Bronchoalveolar Lagave;  Surgeon: Wilber Lin MD;  [facility]    BRONCHOSCOPY, DILATE BRONCHUS, STENT BRONCHUS, COMBINED N/A 2/19/2021    Procedure: BRONCHOSCOPY, flexible, airway dilation, and bronchial wash;  Surgeon: Wilber Lin MD;  [facility]    BRONCHOSCOPY, DILATE BRONCHUS, STENT BRONCHUS, COMBINED N/A 4/9/2021    Procedure: BRONCHOSCOPY, flexible and rigid, Airway suctioning;  Surgeon: Mati Norris MD;  [facility]    BRONCHOSCOPY, DILATE BRONCHUS, STENT BRONCHUS, COMBINED N/A 10/17/2023    Procedure: RIGID, flexible bronchoscopy with airway dilation;  Surgeon: Preet Patel MD;  [facility]    CV RIGHT HEART CATH MEASUREMENTS RECORDED N/A 3/10/2020    Procedure: CV RIGHT HEART CATH;  Surgeon: Wai Garcia MD;  Location:  [facility] CARDIAC CATH  LAB    ENT SURGERY      tonsillectomy as a child    ESOPHAGOSCOPY, GASTROSCOPY, DUODENOSCOPY (EGD), COMBINED N/A 10/29/2018    Procedure: COMBINED ESOPHAGOSCOPY, GASTROSCOPY, DUODENOSCOPY (EGD) with biopsies and polypectomy;  Surgeon: Chente Bloom MD;  [facility]    INSERT EXTRACORPORAL MEMBRANE OXYGENATOR ADULT (OUTSIDE OR) N/A 2/27/2018    Procedure: INSERT EXTRACORPORAL MEMBRANE OXYGENATOR ADULT (OUTSIDE OR);  INSERT EXTRACORPORAL MEMBRANE OXYGENATOR ADULT (OUTSIDE OR) ;  Surgeon: Toby Hernandez MD;  [facility]    IR CVC TUNNEL PLACEMENT > 5 YRS OF AGE  10/25/2019    IR DIALYSIS FISTULOGRAM LEFT  3/2/2021    IR DIALYSIS MECH THROMB, PTA  3/2/2021    IR FLUORO 0-1 HOUR  5/7/2021    IR GASTRO JEJUNOSTOMY TUBE PLACEMENT  2/16/2021    IR PARACENTESIS  1/8/2020    IR THORACENTESIS  9/13/2019    IR TRANSCATHETER BIOPSY  1/8/2020    LASER CO2 BRONCHOSCOPY N/A 4/30/2021    Procedure: Flexible and Rigid Bronchoscopy and Tissue Debulking with CO2 Laser Assistance;  Surgeon: Mati Norris MD;  [facility]    LASER CO2 BRONCHOSCOPY N/A 6/11/2021    Procedure: BRONCHOSCOPY, Flexible and Rigid Bronchoscopy, Tissue Debulking with cryo Assistance, airway dilation,;  Surgeon: Mati Norris MD;  [facility]    LASER CO2 BRONCHOSCOPY N/A 9/16/2021    Procedure: BRONCHOSCOPY, flexible and rigid, CO2 Laser Debulking;  Surgeon: Mati Norris MD;  [facility]    LASER CO2 BRONCHOSCOPY N/A 2/11/2022    Procedure: flexible, rigid bronchoscopy, tissue debulking, airway dilation, co2 laser, bronchoalveolar lavage;  Surgeon: Juana Baugh MD;  [facility]    no prior surgery      REMOVE EXTRACORPORAL MEMBRANE OXYGENATOR ADULT N/A 3/3/2018    Procedure: REMOVE EXTRACORPORAL MEMBRANE OXYGENATOR ADULT;  Removal of Right Femoral Venous and Right Axillary Arterial Extracorporeal Membrane Oxygenator;  Surgeon: Toby Hernandez MD;  [facility]    TRANSPLANT LUNG RECIPIENT  SINGLE X2 Bilateral 3/1/2018    Procedure: TRANSPLANT LUNG RECIPIENT SINGLE X2;  Median Sternotomy, Extracorporeal Membrane Oxygenator, bilateral sequential lung transplant;  Surgeon: Toby Hernandez MD;  Location:  OR           Family History:     Family History   Problem Relation Age of Onset    Hypertension Mother     Arthritis Mother     Pancreatic Cancer Father     Diabetes Father             Social History:     Social History     Socioeconomic History    Marital status:      Spouse name: Not on file    Number of children: Not on file    Years of education: Not on file    Highest education level: Not on file   Occupational History    Not on file   Tobacco Use    Smoking status: Never    Smokeless tobacco: Never   Substance and Sexual Activity    Alcohol use: No     Alcohol/week: 1.0 standard drink of alcohol     Types: 1 Glasses of wine per week    Drug use: No    Sexual activity: Not on file   Other Topics Concern    Parent/sibling w/ CABG, MI or angioplasty before 65F 55M? No   Social History Narrative    3/6/2019 - Lives with . Has three daughters. Four grandchildren (two ). No pets. Travelled previously to [facility]. Has visited [place] several times.      Social Determinants of Health     Financial Resource Strain: Not on file   Food Insecurity: Not on file   Transportation Needs: Not on file   Physical Activity: Not on file   Stress: Not on file   Social Connections: Not on file   Interpersonal Safety: Not on file   Housing Stability: Not on file            Medications:     Current Outpatient Medications   Medication    acetaminophen (TYLENOL) 325 MG tablet    acetylcysteine (MUCOMYST) 10 % nebulizer solution    albuterol (PROVENTIL) (2.5 MG/3ML) 0.083% neb solution    Aspirin 81 MG CAPS    azaTHIOprine (IMURAN) 50 MG tablet    azithromycin (ZITHROMAX) 250 MG tablet    bisacodyl (DULCOLAX) 5 MG EC tablet    budesonide (PULMICORT) 0.5 MG/2ML neb solution    calcium  acetate (PHOSLO) 667 MG CAPS capsule    cholecalciferol 50 MCG (2000 UT) CAPS    fluticasone-salmeterol (ADVAIR) 250-50 MCG/ACT inhaler    lidocaine-prilocaine (EMLA) 2.5-2.5 % external cream    Magnesium Cl-Calcium Carbonate (SLOW-MAG) 71.5-119 MG TBEC    metoprolol tartrate (LOPRESSOR) 25 MG tablet    montelukast (SINGULAIR) 10 MG tablet    multivitamin RENAL (RENAVITE RX/NEPHROVITE) 1 tablet tablet    pantoprazole (PROTONIX) 40 MG EC tablet    posaconazole (NOXAFIL) 100 MG EC tablet    predniSONE (DELTASONE) 5 MG tablet    sulfamethoxazole-trimethoprim (BACTRIM) 400-80 MG tablet    tacrolimus (GENERIC) 0.5 MG capsule    tacrolimus (GENERIC) 1 MG capsule    valGANciclovir (VALCYTE) 450 MG tablet     No current facility-administered medications for this visit.            Physical Exam:   [facility] 06/07/2014 (Exact Date)     GENERAL: alert, NAD  HEENT: NCAT, EOMI, no scleral icterus, oral mucosa moist and without lesions  Neck: no cervical or supraclavicular adenopathy  Lungs: good air flow, no crackles, rhonchi or wheezing  CV: RRR, S1S2, no murmurs noted  Abdomen: normoactive BS, soft, non tender and no organomegaly  Lymph: no edema  Neuro: AAO X 3, CN 2-12 grossly intact  Psychiatric: normal affect, good eye contact  Skin: no rash, jaundice or lesions on limited exam         Data:   All laboratory and imaging data reviewed.      Recent Results (from the past 168 hour(s))   Potassium    Collection Time: 10/17/23  9:39 AM   Result Value Ref Range    Potassium 3.9 3.4 - 5.3 mmol/L   Basic metabolic panel    Collection Time: 10/17/23 10:11 AM   Result Value Ref Range    Sodium 139 135 - 145 mmol/L    Potassium 4.4 3.4 - 5.3 mmol/L    Chloride 99 98 - 107 mmol/L    Carbon Dioxide (CO2) 30 (H) 22 - 29 mmol/L    Anion Gap 10 7 - 15 mmol/L    Urea Nitrogen 22.1 8.0 - 23.0 mg/dL    Creatinine 3.55 (H) 0.51 - 0.95 mg/dL    GFR Estimate 14 (L) >60 mL/min/1.73m2    Calcium 8.9 8.8 - 10.2 mg/dL    Glucose 85 70 - 99 mg/dL    Magnesium    Collection Time: 10/17/23 10:11 AM   Result Value Ref Range    Magnesium 1.9 1.7 - 2.3 mg/dL   Tacrolimus by Tandem Mass Spectrometry    Collection Time: 10/17/23 10:11 AM   Result Value Ref Range    Tacrolimus by Tandem Mass Spectrometry 3.5 (L) 5.0 - 15.0 ug/L    Tacrolimus Last Dose Date      Tacrolimus Last Dose Time     Cytomegalovirus DNA by PCR, Quantitative    Collection Time: 10/17/23 10:11 AM    Specimen: Arm, Right; Blood   Result Value Ref Range    CMV DNA IU/mL, Instrument 75 (H) <1 IU/mL    CMV log 1.9    CBC with platelets and differential    Collection Time: 10/17/23 10:11 AM   Result Value Ref Range    WBC Count 2.4 (L) 4.0 - 11.0 10e3/uL    RBC Count 2.88 (L) 3.80 - 5.20 10e6/uL    Hemoglobin 9.5 (L) 11.7 - 15.7 g/dL    Hematocrit 31.3 (L) 35.0 - 47.0 %     (H) 78 - 100 fL    MCH 33.0 26.5 - 33.0 pg    MCHC 30.4 (L) 31.5 - 36.5 g/dL    RDW 15.7 (H) 10.0 - 15.0 %    Platelet Count 130 (L) 150 - 450 10e3/uL    % Neutrophils 73 %    % Lymphocytes 14 %    % Monocytes 10 %    Mids % (Monos, Eos, Basos)      % Eosinophils 2 %    % Basophils 1 %    % Immature Granulocytes 0 %    NRBCs per 100 WBC 0 <1 /100    Absolute Neutrophils 1.7 1.6 - 8.3 10e3/uL    Absolute Lymphocytes 0.3 (L) 0.8 - 5.3 10e3/uL    Absolute Monocytes 0.2 0.0 - 1.3 10e3/uL    Mids Abs (Monos, Eos, Basos)      Absolute Eosinophils 0.1 0.0 - 0.7 10e3/uL    Absolute Basophils 0.0 0.0 - 0.2 10e3/uL    Absolute Immature Granulocytes 0.0 <=0.4 10e3/uL    Absolute NRBCs 0.0 10e3/uL   Venous Panel POCT    Collection Time: 10/17/23 10:35 AM   Result Value Ref Range    pH Venous POCT 7.47 (H) 7.32 - 7.43    pCO2 Venous POCT 47 40 - 50 mm Hg    pO2 Venous POCT 40 25 - 47 mm Hg    Bicarbonate Venous POCT 34 (H) 21 - 28 mmol/L    Sodium POCT 139 135 - 145 mmol/L    Potassium POCT 3.8 3.5 - 5.0 mmol/L    Hemoglobin POCT 11.2 (L) 11.7 - 15.7 g/dL    Glucose Whole Blood POCT 86 70 - 99 mg/dL    Base Excess/Deficit (+/-) POCT 9.5  (H) -8.1 - 1.9 mmol/L    Calcium, Ionized Whole Blood POCT 4.5 4.4 - 5.2 mg/dL    Lactic Acid POCT 0.6 <=2.0 mmol/L    FIO2 POCT 21.0 %   Actinomyces species culture    Collection Time: 10/17/23 11:05 AM    Specimen: Lung, Right; Washings   Result Value Ref Range    Culture No Actinomyces species isolated after 3 days    Gram Stain    Collection Time: 10/17/23 11:05 AM    Specimen: Lung, Right; Washings   Result Value Ref Range    Gram Stain Result >25 PMNs/low power field     Gram Stain Result 1+ Mixed alo    KOH Preparation    Collection Time: 10/17/23 11:05 AM    Specimen: Lung, Right; Washings   Result Value Ref Range    KOH Preparation No fungal elements seen     KOH Preparation Reference Range: No fungal elements seen.    Nocardia species culture    Collection Time: 10/17/23 11:05 AM    Specimen: Lung, Right; Washings   Result Value Ref Range    Culture No growth after 5 days    Fungal or Yeast Culture Routine    Collection Time: 10/17/23 11:05 AM    Specimen: Lung, Right; Washings   Result Value Ref Range    Culture No growth after 5 days    Non Gyn Cytology with GMS Lung, Right    Collection Time: 10/17/23 11:05 AM   Result Value Ref Range    Final Diagnosis       Specimen A     Interpretation:      Negative for malignancy     Other Findings:      No fungal or Pneumocystis organisms are identified on GMS stained preparations.    Viral cytopathic effect is absent.     Adequacy:     Satisfactory for evaluation          Clinical Information       S/p lung transplant with ongoing cough and exertional dyspnea      Gross Description       A(1). Lung, Right, Right Lung Washing:A. Lung, Right, Right Lung Washing, Bronchial Washing with GMS:  Received 7 ml of hazy, colorless fluid, concentrated, resuspended and processed as 2 Pap stained direct smears and 2 GMS stained direct smears.               Microscopic Description       A microscopic examination was performed.     Case was reviewed by the  following:  Resident Pathologist: Ivan Miranda MD  A resident or fellow in a training program was involved in the initial review, preparation, and/or interpretation of this case.  I, as the senior physician, attest that I have personally reviewed all specimens and or slides, including the listed special stains, and used them with my medical judgement to determine the final diagnosis.              Other Findings Specimen A  Acute inflammation present., Chronic inflammation present., No fungal or Pneumocystis organisms are identified on GMS stained preparations.  Viral cytopathic effect is absent., Viral cytopathic change absent., Mucicarmine stain is negative for intrace...     No fungal or Pneumocystis organisms are identified on GMS stained preparations.  Viral cytopathic effect is absent.    Performing Labs       The technical component of this testing was completed at [facility]     Pneumocystis jirovecil by PCR    Collection Time: 10/17/23 11:05 AM   Result Value Ref Range    P. jirovecii By PCR Not Detected    Cytomegalovirus Quant PCR Non Blood    Collection Time: 10/17/23 11:05 AM   Result Value Ref Range    See Scanned Result CYTOMEGALOVIRUS QUANT PCR NON BLOOD-Scanned    Acid-Fast Bacilli Culture and Stain    Collection Time: 10/17/23 11:05 AM    Specimen: Lung, Right; Washings   Result Value Ref Range    Acid Fast Stain No acid fast bacilli seen     Acid Fast Stain No acid fast bacilli seen      PFT interpretation:  Maneuver: valid and meets ATS guidelines  Moderate obstruction based on Z score  Compared to prior: FEV1 0f 0.87 is unchanged from prior (PFTS at OSH)  The decrease in FVC may represent air trapping v. restrictive physiology.  Lung volumes would be necessary to determine.

## 2023-10-23 NOTE — PROGRESS NOTES
Transplant Coordinator Note    Reason for visit: Post lung transplant follow up visit   Coordinator: Present   Caregiver:     Health concerns addressed today:  1. Resp: felt good for 24 hours post OR dilation, then started to feel worse again. Increased cough and SOB and overall unwell since last Thursday. Put on oxygen which helped a little. Clear sputum. Bronch grew actinomyces.   2. BP high today.   3. CMV pending.   4.     Activity/rehab: Walking everyday for 5-10 minutes.   Oxygen needs: 2L NC continuous.   Pain management/RX: Denies  High risk donor: Yes  CMV status: D+/R+  DVT/PE: H/o DVT  Post op AFIB/follow up with EP: One time occurrence of a-fib  PJP prophylactic: Bactrim    COVID:  COVID-19 infection (yes/no, date of most recent positive test):   Status/instructions given about COVID-19 vaccine:     Pt Education: medications (use/dose/side effects), how/when to call coordinator, frequency of labs, s/s of infection/rejection, call prior to starting any new medications, lab/vital sign book    Health Maintenance:   Last colonoscopy: 7/25/23  Next colonoscopy due: 10 years   Dermatology:   Vaccinations this visit: None     Labs, CXR, PFTs reviewed with patient  Medication record reviewed and reconciled  Questions and concerns addressed     Patient Instructions  1. Continue to hydrate with 60-70 oz fluids daily.  2. Continue to exercise daily or most days of the week.  3. Follow up with your primary care provider for annual gender health maintenance procedures.  4. Follow up with colonoscopy schedule.  5. Follow up with annual dermatology visits.  6. Due for COVID booster and RSV locally.   7. Plan to see infectious disease on 10/30 at 2pm  8. CT scan today.   9. Start Augmentin, will send dose soon.     Next transplant clinic appointment: 4 weeks with CXR, labs and PFTs  Next lab draw: 1 week     AVS printed at time of check out

## 2023-10-24 NOTE — RESULT ENCOUNTER NOTE
Armand Mcdonnell,   Your tacrolimus level was 7.8 at 12 hours on 10/24/23 which is within your goal range of 8-10. No dose change at this time. Please call the transplant office ([phone]) with any questions.   Thanks,   Mikayla

## 2023-10-24 NOTE — LETTER
PHYSICIAN ORDERS      DATE & TIME ISSUED: 2023 3:56 PM  PATIENT NAME: Kecia Blue   : 1960     [facility] MR# [if applicable]: [id number]     DIAGNOSIS:  Lung Transplant  Z94.2    Can you please do the following labs tomorrow instead of her usual standing orders (these were done already today). 10/25/23 just needs:  1,3 beta D glucan fungitell   Aspergillus galactomannan    Sputum culture for bacterial, fungal, nocardia and AFB      Any questions please call: Mikayla     Please fax these results to [phone].        Ame Chow PA-C

## 2023-10-24 NOTE — PATIENT INSTRUCTIONS
Patient Instructions  1. Continue to hydrate with 60-70 oz fluids daily.  2. Continue to exercise daily or most days of the week.  3. Follow up with your primary care provider for annual gender health maintenance procedures.  4. Follow up with colonoscopy schedule.  5. Follow up with annual dermatology visits.  6. Due for COVID booster and RSV locally.   7. Plan to see infectious disease on 10/30 at 2pm  8. CT scan today.   9. Start Augmentin 500 mg daily plus a 500 mg dose after dialysis on dialysis days only.     Next transplant clinic appointment: 4 weeks with CXR, labs and PFTs  Next lab draw: 1 week

## 2023-10-24 NOTE — LETTER
10/24/2023         RE: Kecia Blue  [address] [place] [address]        Dear Colleague,    Thank you for referring your patient, Kecia Blue, to the [facility] [place]. Please see a copy of my visit note below.    [facility]  October 24, 2023         Assessment and Plan:   Kecia Blue is a 62 year old female with h/o bilateral lung transplant on 3/1/18 for ILD for anti synthetase syndrome with postoperative course complicated by right mainstem bronchial stenosis s/p several dilations, left-sided Aspergillus empyema s/p amphotericin beads, currently on posaconazole indefinitely, EBV viremia, CMV viremia, C diff colitis and ESRD on HD. Symptomatic x 2 months, s/p bronch with BAL + for stento s/p levaquin and Bactrim without improvement. CT chest demonstrated recurrent stenosis and now is s/p OR bronch on 10/17 with right stenosis dilation. She felt well for ~~ 24 hours and symptoms have returned. BAL + for actinomyces, GGOs and nodules on OSH CT. Multiple other cultures pending. She also is undergoing treamtment for recurrent CMV.     1. Recurrent CMV viremia: peaked at 2680 on 10/11, down to 75 on 10/17.   - Continue renally dosed valgancyclovir  - CMV pending for today     2. Bilateral lung transplant:   Right mainstem bronchial stenosis: s/p dilation on 10/17. [place] well for about 24 hours and then all her prior symptoms returned including ongoing cough, mainly in the am and  dyspnea with exertion, now on 2L O2 continuous. Sating 93%. DSA 8/8 negative. CT chest 10/11 overread here with RML and KONRAD GGOs and nodules, BAL from 10/17 + for actinomyces, multiple cultures pending including AFB. CXR reviewed today and demonstrates stable transplant. PFTs are unchanged from pre dilation (FEV1 of 0.87 now and at OSH).  - DSA and Immunknow today  - CT chest with expiratory  views to evaluation for recurrent stenosis, worsening infection  - Start Augmentin 500 mg, renally dosed until follow up ID scheduled for Monday, 10/30  - Continue AZA 75 mg daily, tacrolimus (goal 8-10) and prednisone 5 mg daily (decreased for significant osteoporosis)  - On Bactrim 3 times/week  - CLAD therapy: azithromycin, singular and advair    3. Left-sided aspergillus empyema: noted in 2019 s/p needle aspiration with Aspergillus fumigatus on cultures s/p intrapleural amphotericin bead placement. Posaconazole indefinitely per ID.  - Continue posaconazole     4. ESRD on iHD (since 10/2019):  HTN: continues on iHD M/W/F. BP elevated today at 158/96. Renal transplant listing on hold currently for infection and worsening PFTs of unknown etiology  - Continue metoprolol, renal MVI, phosphate binder     5. Osteoporosis: on DEXA from 2022 with significant loss of bone in her lumbar spine and femur. Complicated by steroid use and fact that her activity was severely limited by her knee pain. Vitamin D level of 40. Following with Endocrine, supposed to get Prolia injection.   - Continue calcium/vitamin D  - Decreased prednisone dose per above     Chronic issues:  1. Paroxysmal AFib  2. Hypomagnesemia  3. Dermatomyositis with Raynauds  4. Congestive hepatopathy with fibrosis     RTC: TBD bronch and PFTs  Vaccinations: will need influenza, covid and RSV vaccine  Preventative: colonoscopy due 2033; mammogram in February negative; DEXA > May 2024; following with Alexia Chow PA-C  Pulmonary, Allergy, Critical Care and Sleep Medicine        Interval History:     Felt better for maybe 24 hours after dilation, then symptoms  started back up on Thursday. Started coughing with increased shortness of breath, overall felt unwell. Placed O2 back on last Thursday, went to HD on Friday and felt a little bit better, but decided to stay on the O2. Takes so much energy to get over the shortness of breath. Using 2L O2 continuous  at home. No fever, or chills, no URI symptoms. Cough is more frequent overall, brings up mucous and then is better. Has been coughing 3-4 times/night other than last night. No tightness or wheezing. No chest pain or palpitations. No GI complaints. No pain.          Review of Systems:   Please see HPI, otherwise the complete 10 point ROS is negative.           Past Medical and Surgical History:     Past Medical History:   Diagnosis Date     Acute on chronic respiratory failure with hypoxia (H) 02/21/2018     Anisocoria      Antisynthetase syndrome (H24) 2014     Anxiety      Aspergillus (H)      Aspergillus pneumonia (H) 11/20/2020     Benign essential hypertension      C. difficile colitis      Cytomegalovirus (CMV) viremia (H)      Dermatomyositis (H)      Dysplasia of cervix, low grade (ESTRADA 1)      EBV (Franklin-Barr virus) viremia      ESRD (end stage renal disease) on dialysis (H)      ILD (interstitial lung disease) (H)      Lung replaced by transplant (H)      Osteopenia      Paroxysmal atrial fibrillation (H)      Pneumocystis jiroveci pneumonia (H)      PONV (postoperative nausea and vomiting)      Post-menopause      Pulmonary hypertension (H)      Raynaud's disease      Seronegative rheumatoid arthritis (H)      Past Surgical History:   Procedure Laterality Date     BRONCHOSCOPY (RIGID OR FLEXIBLE), DIAGNOSTIC N/A 4/10/2018    Procedure: COMBINED BRONCHOSCOPY (RIGID OR FLEXIBLE), LAVAGE;;  Surgeon: Mariposa Donohue MD;  [facility]     BRONCHOSCOPY (RIGID OR FLEXIBLE), DIAGNOSTIC N/A 12/23/2020    Procedure: BRONCHOSCOPY, WITH BRONCHOALVEOLAR LAVAGE;  Surgeon: Uri Bass MD;  [facility]     BRONCHOSCOPY (RIGID OR FLEXIBLE), DIAGNOSTIC N/A 5/26/2022    Procedure: BRONCHOSCOPY, WITH BRONCHOALVEOLAR LAVAGE;  Surgeon: Uri Bass MD;  [facility]     BRONCHOSCOPY (RIGID OR FLEXIBLE), DIAGNOSTIC N/A 8/17/2023    Procedure: BRONCHOSCOPY, WITH BRONCHOALVEOLAR LAVAGE;  Surgeon:  Esau Bruno MD;  [facility]     BRONCHOSCOPY (RIGID OR FLEXIBLE), DILATE BRONCHUS / TRACHEA N/A 10/11/2018    Procedure: BRONCHOSCOPY (RIGID OR FLEXIBLE), DILATE BRONCHUS / TRACHEA;  Flexible And Rigid Bronchoscopy and Dilation;  Surgeon: Wilber Lin MD;  [facility]     BRONCHOSCOPY FLEXIBLE N/A 3/13/2018    Procedure: BRONCHOSCOPY FLEXIBLE;  Flexible Bronchoscopy ;  Surgeon: Gissell Sanchez MD;  [facility]     BRONCHOSCOPY FLEXIBLE N/A 5/9/2018    Procedure: BRONCHOSCOPY FLEXIBLE;;  Surgeon: Wilber Lin MD;  [facility]     BRONCHOSCOPY FLEXIBLE AND RIGID N/A 9/10/2018    Procedure: BRONCHOSCOPY FLEXIBLE AND RIGID;  Flexible and Rigid Bronchoscopy with Balloon Dilation, tissue debulking with cryo, and Right mainstem bronchus stent placement;  Surgeon: Wilber Lin MD;  [facility]     BRONCHOSCOPY RIGID N/A 6/6/2018    Procedure: BRONCHOSCOPY RIGID;;  Surgeon: Lopez Macias MD;  [facility]     BRONCHOSCOPY, DILATE BRONCHUS, STENT BRONCHUS, COMBINED N/A 6/11/2018    Procedure: COMBINED BRONCHOSCOPY, DILATE BRONCHUS, STENT BRONCHUS;  Flexible Bronchoscopy, Balloon Dilation, Bronchial Washings;  Surgeon: Wilber Lin MD;  [facility]     BRONCHOSCOPY, DILATE BRONCHUS, STENT BRONCHUS, COMBINED Right 7/10/2018    Procedure: COMBINED BRONCHOSCOPY, DILATE BRONCHUS, STENT BRONCHUS;  Flexible Bronchoscopy, Balloon Dilation, Bronchial Washings  ;  Surgeon: Wilber Lin MD;  [facility]     BRONCHOSCOPY, DILATE BRONCHUS, STENT BRONCHUS, COMBINED N/A 8/2/2018    Procedure: COMBINED BRONCHOSCOPY, DILATE BRONCHUS, STENT BRONCHUS;  Flexible Bronchoscopy, Bronchial Washings, Balloon Dilation;  Surgeon: Wilber Lin MD;  [facility]     BRONCHOSCOPY, DILATE BRONCHUS, STENT BRONCHUS, COMBINED N/A 8/20/2018    Procedure: COMBINED BRONCHOSCOPY, DILATE BRONCHUS, STENT BRONCHUS;  Flexible Bronchoscopy, Balloon  Dilation;  Surgeon: Wilber Lin MD;  [facility]     BRONCHOSCOPY, DILATE BRONCHUS, STENT BRONCHUS, COMBINED N/A 10/29/2018    Procedure: Flexible Bronchoscopy, Balloon Dilation, Stent Revision, Airway Examination And Therapeutic Suctioning, Cyro Tumor Debulking;  Surgeon: Wilber Lin MD;  [facility]     BRONCHOSCOPY, DILATE BRONCHUS, STENT BRONCHUS, COMBINED N/A 11/20/2018    Procedure: Rigid Bronchoscopy, Stent Removal and dilitation;  Surgeon: Wilber Lin MD;  [facility]     BRONCHOSCOPY, DILATE BRONCHUS, STENT BRONCHUS, COMBINED N/A 12/14/2018    Procedure: Flexible And Rigid Bronchoscopy, Balloon Dilation, Bronchial Washing;  Surgeon: Wilber Lin MD;  [facility]     BRONCHOSCOPY, DILATE BRONCHUS, STENT BRONCHUS, COMBINED N/A 1/17/2019    Procedure: Flexible And Rigid Bronchoscopy, Balloon Dilation.;  Surgeon: Wilber Lin MD;  [facility]     BRONCHOSCOPY, DILATE BRONCHUS, STENT BRONCHUS, COMBINED N/A 3/7/2019    Procedure: Flexible and Rigid Bronchoscopy, Bronchial Washing, Balloon Dilation;  Surgeon: Wilber Lin MD;  [facility]     BRONCHOSCOPY, DILATE BRONCHUS, STENT BRONCHUS, COMBINED N/A 6/6/2019    Procedure: Rigid and Flexible Bronchoscopy, Balloon Dilation;  Surgeon: Wilber Lin MD;  [facility]     BRONCHOSCOPY, DILATE BRONCHUS, STENT BRONCHUS, COMBINED N/A 10/11/2019    Procedure: Flexible and Rigid Bronchoscopy, Balloon Dilation, Bronchoalveolar Lagave;  Surgeon: Wilber Lin MD;  [facility]     BRONCHOSCOPY, DILATE BRONCHUS, STENT BRONCHUS, COMBINED N/A 2/19/2021    Procedure: BRONCHOSCOPY, flexible, airway dilation, and bronchial wash;  Surgeon: Wilber Lin MD;  [facility]     BRONCHOSCOPY, DILATE BRONCHUS, STENT BRONCHUS, COMBINED N/A 4/9/2021    Procedure: BRONCHOSCOPY, flexible and rigid, Airway suctioning;  Surgeon: Mati Norris MD;  [facility]      BRONCHOSCOPY, DILATE BRONCHUS, STENT BRONCHUS, COMBINED N/A 10/17/2023    Procedure: RIGID, flexible bronchoscopy with airway dilation;  Surgeon: Preet Patel MD;  [facility]     CV RIGHT HEART CATH MEASUREMENTS RECORDED N/A 3/10/2020    Procedure: CV RIGHT HEART CATH;  Surgeon: Wai Garcia MD;  [facility]     ENT SURGERY      tonsillectomy as a child     ESOPHAGOSCOPY, GASTROSCOPY, DUODENOSCOPY (EGD), COMBINED N/A 10/29/2018    Procedure: COMBINED ESOPHAGOSCOPY, GASTROSCOPY, DUODENOSCOPY (EGD) with biopsies and polypectomy;  Surgeon: Chente Bloom MD;  [facility]     INSERT EXTRACORPORAL MEMBRANE OXYGENATOR ADULT (OUTSIDE OR) N/A 2/27/2018    Procedure: INSERT EXTRACORPORAL MEMBRANE OXYGENATOR ADULT (OUTSIDE OR);  INSERT EXTRACORPORAL MEMBRANE OXYGENATOR ADULT (OUTSIDE OR) ;  Surgeon: Toby Hernandez MD;  [facility]     IR CVC TUNNEL PLACEMENT > 5 YRS OF AGE  10/25/2019     IR DIALYSIS FISTULOGRAM LEFT  3/2/2021     IR DIALYSIS MECH THROMB, PTA  3/2/2021     IR FLUORO 0-1 HOUR  5/7/2021     IR GASTRO JEJUNOSTOMY TUBE PLACEMENT  2/16/2021     IR PARACENTESIS  1/8/2020     IR THORACENTESIS  9/13/2019     IR TRANSCATHETER BIOPSY  1/8/2020     LASER CO2 BRONCHOSCOPY N/A 4/30/2021    Procedure: Flexible and Rigid Bronchoscopy and Tissue Debulking with CO2 Laser Assistance;  Surgeon: Mati Norris MD;  [facility]     LASER CO2 BRONCHOSCOPY N/A 6/11/2021    Procedure: BRONCHOSCOPY, Flexible and Rigid Bronchoscopy, Tissue Debulking with cryo Assistance, airway dilation,;  Surgeon: Mati Norris MD;  [facility]     LASER CO2 BRONCHOSCOPY N/A 9/16/2021    Procedure: BRONCHOSCOPY, flexible and rigid, CO2 Laser Debulking;  Surgeon: Mati Norris MD;  [facility]     LASER CO2 BRONCHOSCOPY N/A 2/11/2022    Procedure: flexible, rigid bronchoscopy, tissue debulking, airway dilation, co2 laser, bronchoalveolar lavage;  Surgeon: Amauri  MD Juana;  [facility]     no prior surgery       REMOVE EXTRACORPORAL MEMBRANE OXYGENATOR ADULT N/A 3/3/2018    Procedure: REMOVE EXTRACORPORAL MEMBRANE OXYGENATOR ADULT;  Removal of Right Femoral Venous and Right Axillary Arterial Extracorporeal Membrane Oxygenator;  Surgeon: Toby Hernandez MD;  [facility]     TRANSPLANT LUNG RECIPIENT SINGLE X2 Bilateral 3/1/2018    Procedure: TRANSPLANT LUNG RECIPIENT SINGLE X2;  Median Sternotomy, Extracorporeal Membrane Oxygenator, bilateral sequential lung transplant;  Surgeon: Toby Hernandez MD;  Location:  OR           Family History:     Family History   Problem Relation Age of Onset     Hypertension Mother      Arthritis Mother      Pancreatic Cancer Father      Diabetes Father             Social History:     Social History     Socioeconomic History     Marital status:      Spouse name: Not on file     Number of children: Not on file     Years of education: Not on file     Highest education level: Not on file   Occupational History     Not on file   Tobacco Use     Smoking status: Never     Smokeless tobacco: Never   Substance and Sexual Activity     Alcohol use: No     Alcohol/week: 1.0 standard drink of alcohol     Types: 1 Glasses of wine per week     Drug use: No     Sexual activity: Not on file   Other Topics Concern     Parent/sibling w/ CABG, MI or angioplasty before 65F 55M? No   Social History Narrative    3/6/2019 - Lives with . Has three daughters. Four grandchildren (two ). No pets. Travelled previously to [facility]. Has visited [place] several times.      Social Determinants of Health     Financial Resource Strain: Not on file   Food Insecurity: Not on file   Transportation Needs: Not on file   Physical Activity: Not on file   Stress: Not on file   Social Connections: Not on file   Interpersonal Safety: Not on file   Housing Stability: Not on file            Medications:     Current Outpatient  Medications   Medication     acetaminophen (TYLENOL) 325 MG tablet     acetylcysteine (MUCOMYST) 10 % nebulizer solution     albuterol (PROVENTIL) (2.5 MG/3ML) 0.083% neb solution     Aspirin 81 MG CAPS     azaTHIOprine (IMURAN) 50 MG tablet     azithromycin (ZITHROMAX) 250 MG tablet     bisacodyl (DULCOLAX) 5 MG EC tablet     budesonide (PULMICORT) 0.5 MG/2ML neb solution     calcium acetate (PHOSLO) 667 MG CAPS capsule     cholecalciferol 50 MCG (2000 UT) CAPS     fluticasone-salmeterol (ADVAIR) 250-50 MCG/ACT inhaler     lidocaine-prilocaine (EMLA) 2.5-2.5 % external cream     Magnesium Cl-Calcium Carbonate (SLOW-MAG) 71.5-119 MG TBEC     metoprolol tartrate (LOPRESSOR) 25 MG tablet     montelukast (SINGULAIR) 10 MG tablet     multivitamin RENAL (RENAVITE RX/NEPHROVITE) 1 tablet tablet     pantoprazole (PROTONIX) 40 MG EC tablet     posaconazole (NOXAFIL) 100 MG EC tablet     predniSONE (DELTASONE) 5 MG tablet     sulfamethoxazole-trimethoprim (BACTRIM) 400-80 MG tablet     tacrolimus (GENERIC) 0.5 MG capsule     tacrolimus (GENERIC) 1 MG capsule     valGANciclovir (VALCYTE) 450 MG tablet     No current facility-administered medications for this visit.            Physical Exam:   [facility] 06/07/2014 (Exact Date)     GENERAL: alert, NAD  HEENT: NCAT, EOMI, no scleral icterus, oral mucosa moist and without lesions  Neck: no cervical or supraclavicular adenopathy  Lungs: good air flow, no crackles, rhonchi or wheezing  CV: RRR, S1S2, no murmurs noted  Abdomen: normoactive BS, soft, non tender and no organomegaly  Lymph: no edema  Neuro: AAO X 3, CN 2-12 grossly intact  Psychiatric: normal affect, good eye contact  Skin: no rash, jaundice or lesions on limited exam         Data:   All laboratory and imaging data reviewed.      Recent Results (from the past 168 hour(s))   Potassium    Collection Time: 10/17/23  9:39 AM   Result Value Ref Range    Potassium 3.9 3.4 - 5.3 mmol/L   Basic metabolic panel    Collection Time:  10/17/23 10:11 AM   Result Value Ref Range    Sodium 139 135 - 145 mmol/L    Potassium 4.4 3.4 - 5.3 mmol/L    Chloride 99 98 - 107 mmol/L    Carbon Dioxide (CO2) 30 (H) 22 - 29 mmol/L    Anion Gap 10 7 - 15 mmol/L    Urea Nitrogen 22.1 8.0 - 23.0 mg/dL    Creatinine 3.55 (H) 0.51 - 0.95 mg/dL    GFR Estimate 14 (L) >60 mL/min/1.73m2    Calcium 8.9 8.8 - 10.2 mg/dL    Glucose 85 70 - 99 mg/dL   Magnesium    Collection Time: 10/17/23 10:11 AM   Result Value Ref Range    Magnesium 1.9 1.7 - 2.3 mg/dL   Tacrolimus by Tandem Mass Spectrometry    Collection Time: 10/17/23 10:11 AM   Result Value Ref Range    Tacrolimus by Tandem Mass Spectrometry 3.5 (L) 5.0 - 15.0 ug/L    Tacrolimus Last Dose Date      Tacrolimus Last Dose Time     Cytomegalovirus DNA by PCR, Quantitative    Collection Time: 10/17/23 10:11 AM    Specimen: Arm, Right; Blood   Result Value Ref Range    CMV DNA IU/mL, Instrument 75 (H) <1 IU/mL    CMV log 1.9    CBC with platelets and differential    Collection Time: 10/17/23 10:11 AM   Result Value Ref Range    WBC Count 2.4 (L) 4.0 - 11.0 10e3/uL    RBC Count 2.88 (L) 3.80 - 5.20 10e6/uL    Hemoglobin 9.5 (L) 11.7 - 15.7 g/dL    Hematocrit 31.3 (L) 35.0 - 47.0 %     (H) 78 - 100 fL    MCH 33.0 26.5 - 33.0 pg    MCHC 30.4 (L) 31.5 - 36.5 g/dL    RDW 15.7 (H) 10.0 - 15.0 %    Platelet Count 130 (L) 150 - 450 10e3/uL    % Neutrophils 73 %    % Lymphocytes 14 %    % Monocytes 10 %    Mids % (Monos, Eos, Basos)      % Eosinophils 2 %    % Basophils 1 %    % Immature Granulocytes 0 %    NRBCs per 100 WBC 0 <1 /100    Absolute Neutrophils 1.7 1.6 - 8.3 10e3/uL    Absolute Lymphocytes 0.3 (L) 0.8 - 5.3 10e3/uL    Absolute Monocytes 0.2 0.0 - 1.3 10e3/uL    Mids Abs (Monos, Eos, Basos)      Absolute Eosinophils 0.1 0.0 - 0.7 10e3/uL    Absolute Basophils 0.0 0.0 - 0.2 10e3/uL    Absolute Immature Granulocytes 0.0 <=0.4 10e3/uL    Absolute NRBCs 0.0 10e3/uL   Venous Panel POCT    Collection Time: 10/17/23  10:35 AM   Result Value Ref Range    pH Venous POCT 7.47 (H) 7.32 - 7.43    pCO2 Venous POCT 47 40 - 50 mm Hg    pO2 Venous POCT 40 25 - 47 mm Hg    Bicarbonate Venous POCT 34 (H) 21 - 28 mmol/L    Sodium POCT 139 135 - 145 mmol/L    Potassium POCT 3.8 3.5 - 5.0 mmol/L    Hemoglobin POCT 11.2 (L) 11.7 - 15.7 g/dL    Glucose Whole Blood POCT 86 70 - 99 mg/dL    Base Excess/Deficit (+/-) POCT 9.5 (H) -8.1 - 1.9 mmol/L    Calcium, Ionized Whole Blood POCT 4.5 4.4 - 5.2 mg/dL    Lactic Acid POCT 0.6 <=2.0 mmol/L    FIO2 POCT 21.0 %   Actinomyces species culture    Collection Time: 10/17/23 11:05 AM    Specimen: Lung, Right; Washings   Result Value Ref Range    Culture No Actinomyces species isolated after 3 days    Gram Stain    Collection Time: 10/17/23 11:05 AM    Specimen: Lung, Right; Washings   Result Value Ref Range    Gram Stain Result >25 PMNs/low power field     Gram Stain Result 1+ Mixed alo    KOH Preparation    Collection Time: 10/17/23 11:05 AM    Specimen: Lung, Right; Washings   Result Value Ref Range    KOH Preparation No fungal elements seen     KOH Preparation Reference Range: No fungal elements seen.    Nocardia species culture    Collection Time: 10/17/23 11:05 AM    Specimen: Lung, Right; Washings   Result Value Ref Range    Culture No growth after 5 days    Fungal or Yeast Culture Routine    Collection Time: 10/17/23 11:05 AM    Specimen: Lung, Right; Washings   Result Value Ref Range    Culture No growth after 5 days    Non Gyn Cytology with GMS Lung, Right    Collection Time: 10/17/23 11:05 AM   Result Value Ref Range    Final Diagnosis       Specimen A     Interpretation:      Negative for malignancy     Other Findings:      No fungal or Pneumocystis organisms are identified on GMS stained preparations.    Viral cytopathic effect is absent.     Adequacy:     Satisfactory for evaluation          Clinical Information       S/p lung transplant with ongoing cough and exertional dyspnea      Gross  Description       A(1). Lung, Right, Right Lung Washing:A. Lung, Right, Right Lung Washing, Bronchial Washing with GMS:  Received 7 ml of hazy, colorless fluid, concentrated, resuspended and processed as 2 Pap stained direct smears and 2 GMS stained direct smears.               Microscopic Description       A microscopic examination was performed.     Case was reviewed by the following:  Resident Pathologist: Ivan Miranda MD  A resident or fellow in a training program was involved in the initial review, preparation, and/or interpretation of this case.  I, as the senior physician, attest that I have personally reviewed all specimens and or slides, including the listed special stains, and used them with my medical judgement to determine the final diagnosis.              Other Findings Specimen A  Acute inflammation present., Chronic inflammation present., No fungal or Pneumocystis organisms are identified on GMS stained preparations.  Viral cytopathic effect is absent., Viral cytopathic change absent., Mucicarmine stain is negative for intrace...     No fungal or Pneumocystis organisms are identified on GMS stained preparations.  Viral cytopathic effect is absent.    Performing Labs       The technical component of this testing was completed at [facility]     Pneumocystis jirovecil by PCR    Collection Time: 10/17/23 11:05 AM   Result Value Ref Range    P. jirovecii By PCR Not Detected    Cytomegalovirus Quant PCR Non Blood    Collection Time: 10/17/23 11:05 AM   Result Value Ref Range    See Scanned Result CYTOMEGALOVIRUS QUANT PCR NON BLOOD-Scanned    Acid-Fast Bacilli Culture and Stain    Collection Time: 10/17/23 11:05 AM    Specimen: Lung, Right; Washings   Result Value Ref Range    Acid Fast Stain No acid fast bacilli seen     Acid Fast Stain No acid fast bacilli seen      PFT interpretation:  Maneuver: valid and meets ATS  guidelines  Moderate obstruction based on Z score  Compared to prior: FEV1 0f 0.87 is unchanged from prior (PFTS at OSH)  The decrease in FVC may represent air trapping v. restrictive physiology.  Lung volumes would be necessary to determine.    Transplant Coordinator Note    Reason for visit: Post lung transplant follow up visit   Coordinator: Present   Caregiver:     Health concerns addressed today:  1. Resp: felt good for 24 hours post OR dilation, then started to feel worse again. Increased cough and SOB and overall unwell since last Thursday. Put on oxygen which helped a little. Clear sputum. Bronch grew actinomyces.   2. BP high today.   3. CMV pending.   4.     Activity/rehab: Walking everyday for 5-10 minutes.   Oxygen needs: 2L NC continuous.   Pain management/RX: Denies  High risk donor: Yes  CMV status: D+/R+  DVT/PE: H/o DVT  Post op AFIB/follow up with EP: One time occurrence of a-fib  PJP prophylactic: Bactrim    COVID:  COVID-19 infection (yes/no, date of most recent positive test):   Status/instructions given about COVID-19 vaccine:     Pt Education: medications (use/dose/side effects), how/when to call coordinator, frequency of labs, s/s of infection/rejection, call prior to starting any new medications, lab/vital sign book    Health Maintenance:   Last colonoscopy: 7/25/23  Next colonoscopy due: 10 years   Dermatology:   Vaccinations this visit: None     Labs, CXR, PFTs reviewed with patient  Medication record reviewed and reconciled  Questions and concerns addressed     Patient Instructions  1. Continue to hydrate with 60-70 oz fluids daily.  2. Continue to exercise daily or most days of the week.  3. Follow up with your primary care provider for annual gender health maintenance procedures.  4. Follow up with colonoscopy schedule.  5. Follow up with annual dermatology visits.  6. Due for COVID booster and RSV locally.   7. Plan to see infectious disease on 10/30 at 2pm  8. CT scan today.   9.  Start Augmentin, will send dose soon.     Next transplant clinic appointment: 4 weeks with CXR, labs and PFTs  Next lab draw: 1 week     AVS printed at time of check out      Again, thank you for allowing me to participate in the care of your patient.        Sincerely,        Ame Chow PA-C

## 2023-10-24 NOTE — TELEPHONE ENCOUNTER
"CT from 10/24/23 reviewed by Ame:    \"Her CT looks worse. Can we see if she can drop off a sputum sample locally rechecking for bacterial, fungal, nocardia and AFB?     Can we have her get a BD glucan, galactomanan, IgE?\"    Reviewed with patient who agrees with plan, she will get labs tomorrow. Advised patient if her breathing gets worse or she needs more oxygen, she should come to the ER. Also inquiring if any other transplant ID provider can see her sooner than 10/30/23.   "

## 2023-10-25 PROBLEM — R60.0 EDEMA OF LEFT UPPER EXTREMITY: Status: ACTIVE | Noted: 2023-01-01

## 2023-10-25 PROBLEM — R06.00 DYSPNEA, UNSPECIFIED TYPE: Status: ACTIVE | Noted: 2023-01-01

## 2023-10-25 NOTE — ED PROVIDER NOTES
"    [place] EMERGENCY DEPARTMENT ([facility])    10/25/23       ED PROVIDER NOTE   ED 8  History     Chief Complaint   Patient presents with    Cough     The history is provided by the patient, medical records and the spouse.     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  She is accompanied by  who provides history as well.   states that the patient's recurrent cough has been ongoing for a long time, and worsening over past few months.  He states that this cough is being evaluated by pulmonology, solid organ transplant service with imaging, bronchial lavage, PFTs, and lab work.      Patient was at [facility] yesterday for this cough, had a CT scan yesterday at clinic, was started on amoxicillin yesterday.  Took a couple doses of this so far.  On the way home patient has been more notified there were some abnormalities noted on their CT scan, they are not sure exactly what the abnormality is. She was told that \"If you don't feel any better you get to the [facility] ER.\"  Plan was made for patient to have labs drawn this morning but couldn't make it due to feeling so poorly. In the meantime her cough has been worsening, has been coughing to point where she vomits.  She denies any preceding nausea before these episodes of emesis, states that this is triggered by coughing.  states that patient was awake from midnight " "last night with increasingly frequent, persistent cough that kept her from sleeping and she is \"wore out, coughed out.\"  She was unable to make her lab draw this morning due to feeling so poorly, called the pulmonary transplant team and came here for further evaluation.  She continues to have coughing fits.  She has been on a nebulizer in past, was told that if it wasn't helping that she should discontinue it.  She would be amenable to a nebulizer at this time.  The patient did have subjective and cold chills on the way here, has not states that they had a keep alternating between turning heat on the car or switching it off. Does gag/dry heave when she has a strong coughing fit.  She hasn't eaten at all since yesterday.     Patient has future labs ordered by their specialist, Ame SIN.   asks if this can be performed here today.    Social history: Accompanied by     I have reviewed the Medications, Allergies, Past Medical and Surgical History, and Social History in the [employer] system.  Past Medical History:   Diagnosis Date    Acute on chronic respiratory failure with hypoxia (H) 02/21/2018    Anisocoria     Antisynthetase syndrome (H24) 2014    Anxiety     Aspergillus (H)     Aspergillus pneumonia (H) 11/20/2020    Benign essential hypertension     C. difficile colitis     Cytomegalovirus (CMV) viremia (H)     Dermatomyositis (H)     Dysplasia of cervix, low grade (ESTRADA 1)     EBV (Franklin-Barr virus) viremia     ESRD (end stage renal disease) on dialysis (H)     ILD (interstitial lung disease) (H)     Lung replaced by transplant (H)     Osteopenia     Paroxysmal atrial fibrillation (H)     Pneumocystis jiroveci pneumonia (H)     PONV (postoperative nausea and vomiting)     Post-menopause     Pulmonary hypertension (H)     Raynaud's disease     Seronegative rheumatoid arthritis (H)        Past Surgical History:   Procedure Laterality Date    BRONCHOSCOPY (RIGID OR FLEXIBLE), DIAGNOSTIC N/A " 4/10/2018    Procedure: COMBINED BRONCHOSCOPY (RIGID OR FLEXIBLE), LAVAGE;;  Surgeon: Mariposa Donohue MD;  [facility]    BRONCHOSCOPY (RIGID OR FLEXIBLE), DIAGNOSTIC N/A 12/23/2020    Procedure: BRONCHOSCOPY, WITH BRONCHOALVEOLAR LAVAGE;  Surgeon: Uri Bass MD;  [facility]    BRONCHOSCOPY (RIGID OR FLEXIBLE), DIAGNOSTIC N/A 5/26/2022    Procedure: BRONCHOSCOPY, WITH BRONCHOALVEOLAR LAVAGE;  Surgeon: Uri Bass MD;  [facility]    BRONCHOSCOPY (RIGID OR FLEXIBLE), DIAGNOSTIC N/A 8/17/2023    Procedure: BRONCHOSCOPY, WITH BRONCHOALVEOLAR LAVAGE;  Surgeon: Esau Bruno MD;  [facility]    BRONCHOSCOPY (RIGID OR FLEXIBLE), DILATE BRONCHUS / TRACHEA N/A 10/11/2018    Procedure: BRONCHOSCOPY (RIGID OR FLEXIBLE), DILATE BRONCHUS / TRACHEA;  Flexible And Rigid Bronchoscopy and Dilation;  Surgeon: Wilber Lin MD;  [facility]    BRONCHOSCOPY FLEXIBLE N/A 3/13/2018    Procedure: BRONCHOSCOPY FLEXIBLE;  Flexible Bronchoscopy ;  Surgeon: Gissell Sanchez MD;  [facility]    BRONCHOSCOPY FLEXIBLE N/A 5/9/2018    Procedure: BRONCHOSCOPY FLEXIBLE;;  Surgeon: Wilber Lin MD;  [facility]    BRONCHOSCOPY FLEXIBLE AND RIGID N/A 9/10/2018    Procedure: BRONCHOSCOPY FLEXIBLE AND RIGID;  Flexible and Rigid Bronchoscopy with Balloon Dilation, tissue debulking with cryo, and Right mainstem bronchus stent placement;  Surgeon: Wilber Lin MD;  [facility]    BRONCHOSCOPY RIGID N/A 6/6/2018    Procedure: BRONCHOSCOPY RIGID;;  Surgeon: Lopez Macias MD;  [facility]    BRONCHOSCOPY, DILATE BRONCHUS, STENT BRONCHUS, COMBINED N/A 6/11/2018    Procedure: COMBINED BRONCHOSCOPY, DILATE BRONCHUS, STENT BRONCHUS;  Flexible Bronchoscopy, Balloon Dilation, Bronchial Washings;  Surgeon: Wilber Lin MD;  [facility]    BRONCHOSCOPY, DILATE BRONCHUS, STENT BRONCHUS, COMBINED Right 7/10/2018    Procedure: COMBINED  BRONCHOSCOPY, DILATE BRONCHUS, STENT BRONCHUS;  Flexible Bronchoscopy, Balloon Dilation, Bronchial Washings  ;  Surgeon: Wilber Lin MD;  [facility]    BRONCHOSCOPY, DILATE BRONCHUS, STENT BRONCHUS, COMBINED N/A 8/2/2018    Procedure: COMBINED BRONCHOSCOPY, DILATE BRONCHUS, STENT BRONCHUS;  Flexible Bronchoscopy, Bronchial Washings, Balloon Dilation;  Surgeon: Wilber Lin MD;  [facility]    BRONCHOSCOPY, DILATE BRONCHUS, STENT BRONCHUS, COMBINED N/A 8/20/2018    Procedure: COMBINED BRONCHOSCOPY, DILATE BRONCHUS, STENT BRONCHUS;  Flexible Bronchoscopy, Balloon Dilation;  Surgeon: Wilber Lin MD;  [facility]    BRONCHOSCOPY, DILATE BRONCHUS, STENT BRONCHUS, COMBINED N/A 10/29/2018    Procedure: Flexible Bronchoscopy, Balloon Dilation, Stent Revision, Airway Examination And Therapeutic Suctioning, Cyro Tumor Debulking;  Surgeon: Wilber Lin MD;  [facility]    BRONCHOSCOPY, DILATE BRONCHUS, STENT BRONCHUS, COMBINED N/A 11/20/2018    Procedure: Rigid Bronchoscopy, Stent Removal and dilitation;  Surgeon: Wilber Lin MD;  [facility]    BRONCHOSCOPY, DILATE BRONCHUS, STENT BRONCHUS, COMBINED N/A 12/14/2018    Procedure: Flexible And Rigid Bronchoscopy, Balloon Dilation, Bronchial Washing;  Surgeon: Wilber Lin MD;  [facility]    BRONCHOSCOPY, DILATE BRONCHUS, STENT BRONCHUS, COMBINED N/A 1/17/2019    Procedure: Flexible And Rigid Bronchoscopy, Balloon Dilation.;  Surgeon: Wilber Lin MD;  [facility]    BRONCHOSCOPY, DILATE BRONCHUS, STENT BRONCHUS, COMBINED N/A 3/7/2019    Procedure: Flexible and Rigid Bronchoscopy, Bronchial Washing, Balloon Dilation;  Surgeon: Wilber Lin MD;  [facility]    BRONCHOSCOPY, DILATE BRONCHUS, STENT BRONCHUS, COMBINED N/A 6/6/2019    Procedure: Rigid and Flexible Bronchoscopy, Balloon Dilation;  Surgeon: Wilber Lin MD;  [facility]    BRONCHOSCOPY,  DILATE BRONCHUS, STENT BRONCHUS, COMBINED N/A 10/11/2019    Procedure: Flexible and Rigid Bronchoscopy, Balloon Dilation, Bronchoalveolar Lagave;  Surgeon: Wilber Lin MD;  [facility]    BRONCHOSCOPY, DILATE BRONCHUS, STENT BRONCHUS, COMBINED N/A 2/19/2021    Procedure: BRONCHOSCOPY, flexible, airway dilation, and bronchial wash;  Surgeon: Wilber Lin MD;  [facility]    BRONCHOSCOPY, DILATE BRONCHUS, STENT BRONCHUS, COMBINED N/A 4/9/2021    Procedure: BRONCHOSCOPY, flexible and rigid, Airway suctioning;  Surgeon: Mati Norris MD;  [facility]    BRONCHOSCOPY, DILATE BRONCHUS, STENT BRONCHUS, COMBINED N/A 10/17/2023    Procedure: RIGID, flexible bronchoscopy with airway dilation;  Surgeon: Preet Patel MD;  [facility]    CV RIGHT HEART CATH MEASUREMENTS RECORDED N/A 3/10/2020    Procedure: CV RIGHT HEART CATH;  Surgeon: Wai Garcia MD;  [facility]    ENT SURGERY      tonsillectomy as a child    ESOPHAGOSCOPY, GASTROSCOPY, DUODENOSCOPY (EGD), COMBINED N/A 10/29/2018    Procedure: COMBINED ESOPHAGOSCOPY, GASTROSCOPY, DUODENOSCOPY (EGD) with biopsies and polypectomy;  Surgeon: Chente Bloom MD;  [facility]    INSERT EXTRACORPORAL MEMBRANE OXYGENATOR ADULT (OUTSIDE OR) N/A 2/27/2018    Procedure: INSERT EXTRACORPORAL MEMBRANE OXYGENATOR ADULT (OUTSIDE OR);  INSERT EXTRACORPORAL MEMBRANE OXYGENATOR ADULT (OUTSIDE OR) ;  Surgeon: Toby Hernandez MD;  [facility]    IR CVC TUNNEL PLACEMENT > 5 YRS OF AGE  10/25/2019    IR DIALYSIS FISTULOGRAM LEFT  3/2/2021    IR DIALYSIS MECH THROMB, PTA  3/2/2021    IR FLUORO 0-1 HOUR  5/7/2021    IR GASTRO JEJUNOSTOMY TUBE PLACEMENT  2/16/2021    IR PARACENTESIS  1/8/2020    IR THORACENTESIS  9/13/2019    IR TRANSCATHETER BIOPSY  1/8/2020    LASER CO2 BRONCHOSCOPY N/A 4/30/2021    Procedure: Flexible and Rigid Bronchoscopy and Tissue Debulking with CO2 Laser Assistance;   Surgeon: Mati Norris MD;  [facility]    LASER CO2 BRONCHOSCOPY N/A 6/11/2021    Procedure: BRONCHOSCOPY, Flexible and Rigid Bronchoscopy, Tissue Debulking with cryo Assistance, airway dilation,;  Surgeon: Mati Norris MD;  [facility]    LASER CO2 BRONCHOSCOPY N/A 9/16/2021    Procedure: BRONCHOSCOPY, flexible and rigid, CO2 Laser Debulking;  Surgeon: Mati Norris MD;  [facility]    LASER CO2 BRONCHOSCOPY N/A 2/11/2022    Procedure: flexible, rigid bronchoscopy, tissue debulking, airway dilation, co2 laser, bronchoalveolar lavage;  Surgeon: Juana Baugh MD;  [facility]    no prior surgery      REMOVE EXTRACORPORAL MEMBRANE OXYGENATOR ADULT N/A 3/3/2018    Procedure: REMOVE EXTRACORPORAL MEMBRANE OXYGENATOR ADULT;  Removal of Right Femoral Venous and Right Axillary Arterial Extracorporeal Membrane Oxygenator;  Surgeon: Toby Hernandez MD;  [facility]    TRANSPLANT LUNG RECIPIENT SINGLE X2 Bilateral 3/1/2018    Procedure: TRANSPLANT LUNG RECIPIENT SINGLE X2;  Median Sternotomy, Extracorporeal Membrane Oxygenator, bilateral sequential lung transplant;  Surgeon: Toby Hernandez MD;  [facility]       Past medical history, past surgical history, medications, and allergies were reviewed with the patient. Additional pertinent items: None    Family History   Problem Relation Age of Onset    Hypertension Mother     Arthritis Mother     Pancreatic Cancer Father     Diabetes Father        Social History     Tobacco Use    Smoking status: Never    Smokeless tobacco: Never   Substance Use Topics    Alcohol use: No     Alcohol/week: 1.0 standard drink of alcohol     Types: 1 Glasses of wine per week        Social history was reviewed with the patient. Additional pertinent items: None    Past Medical History  Past Medical History:   Diagnosis Date    Acute on chronic respiratory failure with hypoxia (H) 02/21/2018    Anisocoria     Antisynthetase syndrome (H24) 2014     Anxiety     Aspergillus (H)     Aspergillus pneumonia (H) 11/20/2020    Benign essential hypertension     C. difficile colitis     Cytomegalovirus (CMV) viremia (H)     Dermatomyositis (H)     Dysplasia of cervix, low grade (ESTRADA 1)     EBV (Franklin-Barr virus) viremia     ESRD (end stage renal disease) on dialysis (H)     ILD (interstitial lung disease) (H)     Lung replaced by transplant (H)     Osteopenia     Paroxysmal atrial fibrillation (H)     Pneumocystis jiroveci pneumonia (H)     PONV (postoperative nausea and vomiting)     Post-menopause     Pulmonary hypertension (H)     Raynaud's disease     Seronegative rheumatoid arthritis (H)      Past Surgical History:   Procedure Laterality Date    BRONCHOSCOPY (RIGID OR FLEXIBLE), DIAGNOSTIC N/A 4/10/2018    Procedure: COMBINED BRONCHOSCOPY (RIGID OR FLEXIBLE), LAVAGE;;  Surgeon: Mariposa Donohue MD;  [facility]    BRONCHOSCOPY (RIGID OR FLEXIBLE), DIAGNOSTIC N/A 12/23/2020    Procedure: BRONCHOSCOPY, WITH BRONCHOALVEOLAR LAVAGE;  Surgeon: Uri Bass MD;  [facility]    BRONCHOSCOPY (RIGID OR FLEXIBLE), DIAGNOSTIC N/A 5/26/2022    Procedure: BRONCHOSCOPY, WITH BRONCHOALVEOLAR LAVAGE;  Surgeon: Uri Bass MD;  [facility]    BRONCHOSCOPY (RIGID OR FLEXIBLE), DIAGNOSTIC N/A 8/17/2023    Procedure: BRONCHOSCOPY, WITH BRONCHOALVEOLAR LAVAGE;  Surgeon: Esau Bruno MD;  [facility]    BRONCHOSCOPY (RIGID OR FLEXIBLE), DILATE BRONCHUS / TRACHEA N/A 10/11/2018    Procedure: BRONCHOSCOPY (RIGID OR FLEXIBLE), DILATE BRONCHUS / TRACHEA;  Flexible And Rigid Bronchoscopy and Dilation;  Surgeon: Wilber Lin MD;  [facility]    BRONCHOSCOPY FLEXIBLE N/A 3/13/2018    Procedure: BRONCHOSCOPY FLEXIBLE;  Flexible Bronchoscopy ;  Surgeon: Gissell Sanchez MD;  [facility]    BRONCHOSCOPY FLEXIBLE N/A 5/9/2018    Procedure: BRONCHOSCOPY FLEXIBLE;;  Surgeon: Wilber Lin MD;  Location:  GI     BRONCHOSCOPY FLEXIBLE AND RIGID N/A 9/10/2018    Procedure: BRONCHOSCOPY FLEXIBLE AND RIGID;  Flexible and Rigid Bronchoscopy with Balloon Dilation, tissue debulking with cryo, and Right mainstem bronchus stent placement;  Surgeon: Wilber Lin MD;  [facility]    BRONCHOSCOPY RIGID N/A 6/6/2018    Procedure: BRONCHOSCOPY RIGID;;  Surgeon: Lopez Macias MD;  [facility]    BRONCHOSCOPY, DILATE BRONCHUS, STENT BRONCHUS, COMBINED N/A 6/11/2018    Procedure: COMBINED BRONCHOSCOPY, DILATE BRONCHUS, STENT BRONCHUS;  Flexible Bronchoscopy, Balloon Dilation, Bronchial Washings;  Surgeon: Wilber Lin MD;  [facility]    BRONCHOSCOPY, DILATE BRONCHUS, STENT BRONCHUS, COMBINED Right 7/10/2018    Procedure: COMBINED BRONCHOSCOPY, DILATE BRONCHUS, STENT BRONCHUS;  Flexible Bronchoscopy, Balloon Dilation, Bronchial Washings  ;  Surgeon: Wilber Lin MD;  [facility]    BRONCHOSCOPY, DILATE BRONCHUS, STENT BRONCHUS, COMBINED N/A 8/2/2018    Procedure: COMBINED BRONCHOSCOPY, DILATE BRONCHUS, STENT BRONCHUS;  Flexible Bronchoscopy, Bronchial Washings, Balloon Dilation;  Surgeon: Wilber Lin MD;  [facility]    BRONCHOSCOPY, DILATE BRONCHUS, STENT BRONCHUS, COMBINED N/A 8/20/2018    Procedure: COMBINED BRONCHOSCOPY, DILATE BRONCHUS, STENT BRONCHUS;  Flexible Bronchoscopy, Balloon Dilation;  Surgeon: Wilber Lin MD;  [facility]    BRONCHOSCOPY, DILATE BRONCHUS, STENT BRONCHUS, COMBINED N/A 10/29/2018    Procedure: Flexible Bronchoscopy, Balloon Dilation, Stent Revision, Airway Examination And Therapeutic Suctioning, Cyro Tumor Debulking;  Surgeon: Wilber Lin MD;  [facility]    BRONCHOSCOPY, DILATE BRONCHUS, STENT BRONCHUS, COMBINED N/A 11/20/2018    Procedure: Rigid Bronchoscopy, Stent Removal and dilitation;  Surgeon: Wilber Lin MD;  [facility]    BRONCHOSCOPY, DILATE BRONCHUS, STENT BRONCHUS, COMBINED N/A  12/14/2018    Procedure: Flexible And Rigid Bronchoscopy, Balloon Dilation, Bronchial Washing;  Surgeon: Wilber Lin MD;  [facility]    BRONCHOSCOPY, DILATE BRONCHUS, STENT BRONCHUS, COMBINED N/A 1/17/2019    Procedure: Flexible And Rigid Bronchoscopy, Balloon Dilation.;  Surgeon: Wilber Lin MD;  [facility]    BRONCHOSCOPY, DILATE BRONCHUS, STENT BRONCHUS, COMBINED N/A 3/7/2019    Procedure: Flexible and Rigid Bronchoscopy, Bronchial Washing, Balloon Dilation;  Surgeon: Wilber Lin MD;  [facility]    BRONCHOSCOPY, DILATE BRONCHUS, STENT BRONCHUS, COMBINED N/A 6/6/2019    Procedure: Rigid and Flexible Bronchoscopy, Balloon Dilation;  Surgeon: Wilber Lin MD;  [facility]    BRONCHOSCOPY, DILATE BRONCHUS, STENT BRONCHUS, COMBINED N/A 10/11/2019    Procedure: Flexible and Rigid Bronchoscopy, Balloon Dilation, Bronchoalveolar Lagave;  Surgeon: Wilber Lin MD;  [facility]    BRONCHOSCOPY, DILATE BRONCHUS, STENT BRONCHUS, COMBINED N/A 2/19/2021    Procedure: BRONCHOSCOPY, flexible, airway dilation, and bronchial wash;  Surgeon: Wilber Lin MD;  [facility]    BRONCHOSCOPY, DILATE BRONCHUS, STENT BRONCHUS, COMBINED N/A 4/9/2021    Procedure: BRONCHOSCOPY, flexible and rigid, Airway suctioning;  Surgeon: Mati Norris MD;  [facility]    BRONCHOSCOPY, DILATE BRONCHUS, STENT BRONCHUS, COMBINED N/A 10/17/2023    Procedure: RIGID, flexible bronchoscopy with airway dilation;  Surgeon: Preet Patel MD;  [facility]    CV RIGHT HEART CATH MEASUREMENTS RECORDED N/A 3/10/2020    Procedure: CV RIGHT HEART CATH;  Surgeon: Wai Garcia MD;  Location:  [facility] CARD[facility]AC CATH LAB    ENT SURGERY      tonsillectomy as a child    ESOPHAGOSCOPY, GASTROSCOPY, DUODENOSCOPY (EGD), COMBINED N/A 10/29/2018    Procedure: COMBINED ESOPHAGOSCOPY, GASTROSCOPY, DUODENOSCOPY (EGD) with biopsies and polypectomy;  Surgeon: Chente Bloom  MD Maninder;  [facility]    INSERT EXTRACORPORAL MEMBRANE OXYGENATOR ADULT (OUTSIDE OR) N/A 2/27/2018    Procedure: INSERT EXTRACORPORAL MEMBRANE OXYGENATOR ADULT (OUTSIDE OR);  INSERT EXTRACORPORAL MEMBRANE OXYGENATOR ADULT (OUTSIDE OR) ;  Surgeon: Toby Hernandez MD;  [facility]    IR CVC TUNNEL PLACEMENT > 5 YRS OF AGE  10/25/2019    IR DIALYSIS FISTULOGRAM LEFT  3/2/2021    IR DIALYSIS MECH THROMB, PTA  3/2/2021    IR FLUORO 0-1 HOUR  5/7/2021    IR GASTRO JEJUNOSTOMY TUBE PLACEMENT  2/16/2021    IR PARACENTESIS  1/8/2020    IR THORACENTESIS  9/13/2019    IR TRANSCATHETER BIOPSY  1/8/2020    LASER CO2 BRONCHOSCOPY N/A 4/30/2021    Procedure: Flexible and Rigid Bronchoscopy and Tissue Debulking with CO2 Laser Assistance;  Surgeon: Mati Norris MD;  [facility]    LASER CO2 BRONCHOSCOPY N/A 6/11/2021    Procedure: BRONCHOSCOPY, Flexible and Rigid Bronchoscopy, Tissue Debulking with cryo Assistance, airway dilation,;  Surgeon: Mati Norris MD;  [facility]    LASER CO2 BRONCHOSCOPY N/A 9/16/2021    Procedure: BRONCHOSCOPY, flexible and rigid, CO2 Laser Debulking;  Surgeon: Mati Norirs MD;  [facility]    LASER CO2 BRONCHOSCOPY N/A 2/11/2022    Procedure: flexible, rigid bronchoscopy, tissue debulking, airway dilation, co2 laser, bronchoalveolar lavage;  Surgeon: Juana Baugh MD;  [facility]    no prior surgery      REMOVE EXTRACORPORAL MEMBRANE OXYGENATOR ADULT N/A 3/3/2018    Procedure: REMOVE EXTRACORPORAL MEMBRANE OXYGENATOR ADULT;  Removal of Right Femoral Venous and Right Axillary Arterial Extracorporeal Membrane Oxygenator;  Surgeon: Toby Hernandez MD;  [facility]    TRANSPLANT LUNG RECIPIENT SINGLE X2 Bilateral 3/1/2018    Procedure: TRANSPLANT LUNG RECIPIENT SINGLE X2;  Median Sternotomy, Extracorporeal Membrane Oxygenator, bilateral sequential lung transplant;  Surgeon: Toby Hernandez MD;  [facility]     acetaminophen  (TYLENOL) 325 MG tablet  acetylcysteine (MUCOMYST) 10 % nebulizer solution  albuterol (PROVENTIL) (2.5 MG/3ML) 0.083% neb solution  amoxicillin-clavulanate (AUGMENTIN) 500-125 MG tablet  azaTHIOprine (IMURAN) 50 MG tablet  azithromycin (ZITHROMAX) 250 MG tablet  budesonide (PULMICORT) 0.5 MG/2ML neb solution  calcium acetate (PHOSLO) 667 MG CAPS capsule  cholecalciferol 50 MCG (2000 UT) CAPS  fluticasone-salmeterol (ADVAIR) 250-50 MCG/ACT inhaler  lidocaine-prilocaine (EMLA) 2.5-2.5 % external cream  Magnesium Cl-Calcium Carbonate (SLOW-MAG) 71.5-119 MG TBEC  metoprolol tartrate (LOPRESSOR) 25 MG tablet  montelukast (SINGULAIR) 10 MG tablet  multivitamin RENAL (RENAVITE RX/NEPHROVITE) 1 tablet tablet  pantoprazole (PROTONIX) 40 MG EC tablet  posaconazole (NOXAFIL) 100 MG EC tablet  predniSONE (DELTASONE) 5 MG tablet  sulfamethoxazole-trimethoprim (BACTRIM) 400-80 MG tablet  tacrolimus (GENERIC) 0.5 MG capsule  tacrolimus (GENERIC) 1 MG capsule  valGANciclovir (VALCYTE) 450 MG tablet      No Known Allergies  Family History  Family History   Problem Relation Age of Onset    Hypertension Mother     Arthritis Mother     Pancreatic Cancer Father     Diabetes Father      Social History   Social History     Tobacco Use    Smoking status: Never    Smokeless tobacco: Never   Substance Use Topics    Alcohol use: No     Alcohol/week: 1.0 standard drink of alcohol     Types: 1 Glasses of wine per week    Drug use: No         A medically appropriate review of systems was performed with pertinent positives and negatives noted in the HPI, and all other systems negative.   Physical Exam   BP: 114/76  Pulse: 116  Temp: 97.9  F (36.6  C)  Resp: 19  SpO2: 97 %    Physical Exam    Physical Exam   Constitutional:   well nourished, well developed, resting comfortably   HENT:   Head: Normocephalic and atraumatic.   Eyes: Conjunctivae are normal. Pupils are equal, round, and reactive to light.   TMS are clear, pharynx has no erythema or  exudate, mucous membranes are moist  Neck:   no adenopathy, no bony tenderness  Chest: Midline scar with ecchymoses (old).  Erythema and heat to the left breast (new)  Cardiovascular: tachycardia without murmurs or gallops  Pulmonary/Chest: bibasilar crackles  GI: Soft with good bowel sounds.  Non-tender, non-distended, with no guarding, no rebound, no peritoneal signs.   Back:  No bony or CVA tenderness   Musculoskeletal: 1 to 2 mm pitting edema of bilateral lower extremities  Left upper extremity: Swelling to left upper extremity in the area of fistula, left hand is swollen compared to right.  Skin: Skin is warm and dry. No rash noted.   Neurological: alert and oriented to person, place, and time. Nonfocal exam  Psychiatric:  normal mood and affect.     ED Course        Procedures              Results for orders placed or performed during the hospital encounter of 10/25/23 (from the past 24 hour(s))   XR Chest 2 Views    Narrative    XR CHEST 2 VIEWS  10/25/2023 1:10 PM     HISTORY:  cough, B lung transplant       COMPARISON:  CT chest 10/24/2023, and chest radiograph 9/7/2023    TECHNIQUE: XR CHEST 2 VIEWS    FINDINGS:   Unchanged postsurgical changes related to bilateral lung transplant  including midline sternotomy wires, and right axillary and mediastinal  surgical clips.  Low lung volumes with scattered basilar predominant linear  interstitial opacities, particularly at the right lung base. Bilateral  perihilar airspace opacification.  Small bilateral pleural effusions. No pneumothorax. Cardiac silhouette  is normal.        Impression    IMPRESSION:  Low lung volumes with findings reflective of moderate pulmonary edema.  Cannot rule out superimposed infectious process    I have personally reviewed the examination and initial interpretation  and I agree with the findings.    TERRI ISSA MD         SYSTEM ID:  [id number]   CBC with platelets differential    Narrative    The following orders were created  for panel order CBC with platelets differential.  Procedure                               Abnormality         Status                     ---------                               -----------         ------                     CBC with platelets and d...[[id number]]  Abnormal            Final result                 Please view results for these tests on the individual orders.   Comprehensive metabolic panel   Result Value Ref Range    Sodium 133 (L) 135 - 145 mmol/L    Potassium 4.4 3.4 - 5.3 mmol/L    Carbon Dioxide (CO2) 26 22 - 29 mmol/L    Anion Gap 11 7 - 15 mmol/L    Urea Nitrogen 24.6 (H) 8.0 - 23.0 mg/dL    Creatinine 4.90 (H) 0.51 - 0.95 mg/dL    GFR Estimate 10 (L) >60 mL/min/1.73m2    Calcium 8.3 (L) 8.8 - 10.2 mg/dL    Chloride 96 (L) 98 - 107 mmol/L    Glucose 92 70 - 99 mg/dL    Alkaline Phosphatase 232 (H) 35 - 104 U/L    AST 34 0 - 45 U/L    ALT 25 0 - 50 U/L    Protein Total 6.0 (L) 6.4 - 8.3 g/dL    Albumin 3.0 (L) 3.5 - 5.2 g/dL    Bilirubin Total 1.0 <=1.2 mg/dL   Lipase   Result Value Ref Range    Lipase 24 13 - 60 U/L   Lactic acid whole blood   Result Value Ref Range    Lactic Acid 1.1 0.7 - 2.0 mmol/L   CBC with platelets and differential   Result Value Ref Range    WBC Count 11.6 (H) 4.0 - 11.0 10e3/uL    RBC Count 3.07 (L) 3.80 - 5.20 10e6/uL    Hemoglobin 10.1 (L) 11.7 - 15.7 g/dL    Hematocrit 30.8 (L) 35.0 - 47.0 %     78 - 100 fL    MCH 32.9 26.5 - 33.0 pg    MCHC 32.8 31.5 - 36.5 g/dL    RDW 16.7 (H) 10.0 - 15.0 %    Platelet Count 142 (L) 150 - 450 10e3/uL    % Neutrophils 94 %    % Lymphocytes 2 %    % Monocytes 1 %    % Eosinophils 1 %    % Basophils 0 %    % Immature Granulocytes 2 %    NRBCs per 100 WBC 0 <1 /100    Absolute Neutrophils 11.0 (H) 1.6 - 8.3 10e3/uL    Absolute Lymphocytes 0.2 (L) 0.8 - 5.3 10e3/uL    Absolute Monocytes 0.1 0.0 - 1.3 10e3/uL    Absolute Eosinophils 0.1 0.0 - 0.7 10e3/uL    Absolute Basophils 0.0 0.0 - 0.2 10e3/uL    Absolute Immature Granulocytes  0.2 <=0.4 10e3/uL    Absolute NRBCs 0.0 10e3/uL   Extra Tube    Narrative    The following orders were created for panel order Extra Tube.  Procedure                               Abnormality         Status                     ---------                               -----------         ------                     Extra Blue Top Tube[[id number]]                              Final result                 Please view results for these tests on the individual orders.   Extra Blue Top Tube   Result Value Ref Range    Hold Specimen JIC      *Note: Due to a large number of results and/or encounters for the requested time period, some results have not been displayed. A complete set of results can be found in Results Review.     Medications   piperacillin-tazobactam (ZOSYN) 2.25 g vial to attach to  ml bag (2.25 g Intravenous $New Bag 10/25/23 5561)   minocycline (MINOCIN) 200 mg in sodium chloride 0.9 % 100 mL intermittent infusion (has no administration in time range)   pharmacy alert - intermittent dosing (has no administration in time range)   vancomycin place jordan - receiving intermittent dosing (has no administration in time range)   vancomycin (VANCOCIN) 1,250 mg in 0.9% NaCl 250 mL intermittent infusion (has no administration in time range)              Critical care was not performed.     Medical Decision Making  The patient's presentation was of high complexity (a chronic illness severe exacerbation, progression, or side effect of treatment).    The patient's evaluation involved:  review of 2 test result(s) ordered prior to this encounter (prior CT, laboratory and imaging studies)  ordering and/or review of 3+ test(s) in this encounter (see separate area of note for details)  discussion of management or test interpretation with another health professional (transplant clinic)    The patient's management necessitated moderate risk (prescription drug management including medications given in the ED) and high risk  (a decision regarding hospitalization).     Assessments & Plan (with Medical Decision Making)       I have reviewed the nursing notes.  EMERGENCY DEPARTMENT COURSE: Patient was seen and examined at 1148 am in room 8.       Chart review shows that the chest CT done yesterday 10/24/23 showed  IMPRESSION: Worsening consolidation bilaterally concerning for  worsening infection. New prominent left upper lobe anterior  pleural-based soft tissue thickening. Recommend follow to clearing.  Neoplasm somewhat less likely given the short interval but cannot be  entirely excluded. Mild air trapping.  Ectasia of the mid ascending thoracic aorta.  Left breast skin thickening and edema in the breast tissues again  present. Rim calcified almost certainly benign density at the left  lung base. Trace pleural effusions bilaterally.  Unchanged osteopenic T5 compression deformity.  Atherosclerosis.     CBC today shows pancytopenia, with a WBC of 11.6, hemoglobin of 10.1, and platelets of 142.  Comprehensive metabolic panel shows hyponatremia, with a sodium of 133.  Creatinine is elevated at 4.9 with a GFR of 10 consistent with her end-stage renal disease.  Total protein and albumin are low.  Alkaline phosphatase is elevated at 232.  Lactate is normal at 1.1.  Chest x-ray shows FINDINGS:   Unchanged postsurgical changes related to bilateral lung transplant  including midline sternotomy wires, and right axillary and mediastinal  surgical clips.  Low lung volumes with scattered basilar predominant linear  interstitial opacities, particularly at the right lung base. Bilateral  perihilar airspace opacification.  Small bilateral pleural effusions. No pneumothorax. Cardiac silhouette  is normal.    I obtained an ultrasound of the patient's left upper extremity to rule out DVT.  Results of this study are pending at the time of this dictation.    Kecia VILLAFANA Mirza is a 62 year old female with a history of bilateral lung transplant who presents  with worsening shortness of breath cough, posttussive vomiting and chills.  Chest CT from yesterday suggest worsening infection.  She is pancytopenic here.  Physical examination suggest that the patient may have a developing cellulitis of her right breast.  I treated her with IV vancomycin and IV Zosyn for presumed healthcare acquired pneumonia and developing cellulitis.  She will be admitted to the medicine service for further evaluation and treatment.    I spoke with Dr. Brito, triage hospitalist regarding admission  I have reviewed the findings, diagnosis, plan and need for follow up with the patient.    New Prescriptions    No medications on file       Final diagnoses:   Dyspnea, unspecified type   Edema of left upper extremity   S/P lung transplant (H)   Cellulitis of right breast       I, Yuli Arnold, am serving as a trained medical scribe to document services personally performed by Nicole Cobos MD based on the provider's statements to me on October 25, 2023.  This document has been checked and approved by the attending provider.    I, Nicole Cobos MD, was physically present and have reviewed and verified the accuracy of this note documented by Yuli Arnold, medical scribe.       This note was created in part by the use of Dragon voice recognition dictation system. Inadvertent grammatical errors and typographical errors may still exist.    Nicole Cobos MD      10/25/2023   [facility] EMERGENCY DEPARTMENT       Nicole Cobos MD  10/25/23 1529

## 2023-10-25 NOTE — TELEPHONE ENCOUNTER
Razia,  reports that patient has not been doing well.  Overnight has been coughing and unable to sleep.  Patient feels miserable.  Patient also needs increased amounts of oxygen to keep sats above 90%.  Patient does have Raynaud's show  is not sure if oxygen sat readings are accurate, but patient feeling increased shortness of breath.  Patient on dialysis Monday Wednesday Friday.    Recent CT scan showing the following:    IMPRESSION: Worsening consolidation bilaterally concerning for  worsening infection. New prominent left upper lobe anterior  pleural-based soft tissue thickening. Recommend follow to clearing.  Neoplasm somewhat less likely given the short interval but cannot be  entirely excluded. Mild air trapping.  Ectasia of the mid ascending thoracic aorta.  Left breast skin thickening and edema in the breast tissues again  present. Rim calcified almost certainly benign density at the left  lung base. Trace pleural effusions bilaterally.  Unchanged osteopenic T5 compression deformity.  Atherosclerosis.     and patient in route to [facility] ED for the above symptoms.  Report called to ED. ETA: 11: 30.

## 2023-10-25 NOTE — H&P
Resident/Fellow Attestation   I, Keri Tejeda MD, was present with the medical/MARIA A student who participated in the service and in the documentation of the note.  I have verified the history and personally performed the physical exam and medical decision making.  I agree with the assessment and plan of care as documented in the note.      Keri Tejeda MD  PGY3  Date of Service (when I saw the patient): 10/25/23    [facility]    History and Physical - Medicine Service, MAROON TEAM 3       Date of Admission:  10/25/2023    Assessment & Plan      Kecia Blue is a 62 year old female admitted on 10/25/2023. 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    #Cough  #Leukocytosis in immunosuppressed pt  #ILD 2/2 anti-synthetase syndrome s/p B/L lung transplant 3/2018  #Actinomyces (+) BAL  #Hx Stenotrophomonas Pneumonia  #Chronic hypoxic respiratory failure requiring 2L home NC  Pt with 2month hx of cough. Hx BL lung transplant (2018) c/b right mainstem bronchial stenosis requiring serial treatments with balloon bronchoplasty procedures, most recently on 10/17/23. Cough improved for 24 hours, but the with increased dyspnea, increased need for oxygen for comfort, and cough. Pt saw transplant pulm day pta in clinic who started augmentin with planned ID follow-up on 10/30, however, cough continued to progress. On presentation to ED, but with mild tachycardia, tachypnea, and  leukocytosis. CT Chest from 10/24 notable for worsening consolidation BL c/f worsening infection, along with new prominent KONRAD soft tissue thickening. BAL on 10/17 notable actinomyces. Pt also has hx of stenotrophomonas from August of 2023. Given the above, highest suspicion for pulmonary source of infection. Completing infectious work-up with UA, Bcx x2. Will defer further pulmonary work-up to pulmonary transplant team in a.m.  -Supplemental o2 PRN to maintain o2 saturations >92%  -Pulmonary transplant team consulted   -c/w azathioprine 75mg qday   -c/w azithromycin 250mg qday   -c/w tacrolimus    -c/w prednisone 5mg qday   -c/w Bactrim for PJP ppx  -c/w singulair + advair   -Transplant ID consult for assistance with work-up/abx management  -Abx coverage:   -c/w vancomycin (10/25-p)   -c/w minocycline (10/25-p)   -c/w zosyn (10/25-p)  -CBC w/ diff tomorrow  -procal pending    #Diffuse pruritic erythema:  Pt noted to have diffuse blanchable erythema during exam today that is very pruritic. Per pt and  this is new this afternoon. No dyspnea, breathing concerns. Pt receiving zosyn infusion at time of interview. C/f allergic reaction- discussed with pharmacy and patient has received all antitiotics (zosyn, vancomycin, minocycline, and augmentin) previously and w/o complication. Unclear etiology at this time. Will trial benadryl with symptom relief. VSS; no c/f anaphylaxis.  -Diphenhydramine 50mg x1  -Atarax 25mg q6h PRN for itching  -Will closely monitor for progression of rash    #Hx Left Sided Aspergillus Empyema:  First noted in 2019. Needle aspiration confirmatory of aspergillus fumigatus, now s/p intrapleural amphotericin bead placement. Discussions had regarding surgery, however, surgery felt to be associated with too high of morbidity, and indefinite posaconazole decided as plan.  -c/w pta posaconazole  -Can defer checking level tonight per transplant pulm    #Left Breast Rash:  Pt with diffuse erythema  including left breast. Left breast with orange peel skin, increased skin thickness. Per patient this lesion has been present for months, even years and has not changed in appearance. Does not cause her discomfort. No nipple discharge. Per pt appeared after fistula placement. 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 Highest suspicion for lymphatic/venous changes post-fistulogram as cause of these changes, though differential also includes mastitis/cellulitis (though reassuring that area is not warm or w/ inc. Erythema compared to rest of body and is chronic and unchanged), malignancy (last mammogram  in 02/2023 and lesion was present and is unchanged since then), or dermatomyositis associated rash.  -Consider dermatology consult in the a.m. for further assessment    #ESRD on HD (M, W, F)  #Macrocytic anemia  #Hyperphosphatemia  Hx of ESRD on HD. Missed dialysis today as she is in the emergency room. Does not appear to be grossly volume overloaded; labs without any emergent abnormalities. Hb 10.1 today, which is at pt's baseline. Suspect anemia of renal disease.   -ESRD service consulted:   -c/w pta vitamins   -Epo/iron per nephrology   -c/w phoslo   -renal panel daily    #h/o EBV viremia  #h/o CMV viremia  -c/w valgancyclovir  -CMV + EMV levels per transplant pulm    #h/o C diff colitis  Pt appears to have remote hx of c. Diff colitis. Currently without any diarrhea.     Diet: Renal Diet  DVT Prophylaxis: Heparin SQ  Lines: None     Cardiac Monitoring: None  Code Status:  Full code    Disposition Plan      Expected Discharge Date: 10/27/2023                The patient's care was discussed with the Attending Physician, Dr. Emanuel and Patient.    George Jean  Medical Student  Medicine Service, JOSE TEAM 3  [facility]  Securely message with Vocera (more info)  Text page via  "[facility] Paging/Directory   See signed in provider for up to date coverage information  ______________________________________________________________________    Chief Complaint   \"Cough is getting worse\"    History is obtained from the patient and patient's spouse, and chart review of pulmonary team documentation.    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      Pt and spouse report first noticing cough appear approximately 2 months ago.  She has history of bilateral lung transplant in 2018 and has subsequently experienced ongoing difficulty with right mainstem bronchial stenosis requiring serial treatments with balloon bronchoplasty procedures, which was most recently performed on 10/17/23. She reports her cough had improved some for approx 24 hrs following this most recent bronchoplasty procedure, however her symptoms subsequently returned and cough has now worsened. Pt endorses increased shortness of breath, fatigue, feeling generally unwell, in addition to her cough. She has been using 2L O2 continuous at home d/t worsening respiratory function. Denies fevers, chills. Reports cough has become more frequent, is productive with mucus. Reports cough improves for short period following having productive cough. Reports coughing occurs throughout day and night. Denies chest tightness, wheezing, chest pain, palpitations. " Denies nausea, vomiting, diarrhea, constipation.    Has has recent BAL which returned positive for actinomyces. Additionally recent CXR demonstrating GGOs, nodules.     During exam, Pt observed to be itching skin of her hands and wrists. Pt reported feeling increasingly itchy diffusely across her body. Pt and her spouse also note that the skin of her hands, arms, feet, legs, chest, and back appear more red than her baseline. Additionally state her lower arms, wrists, and hands appear more swollen than her baseline. She was started on Augmentin yesterday 10/24/23 for concern of infectious process. Also, she had been receiving IV Zosyn treatment for 20 min in ED when we began meeting with her. Pt and spouse deny history of known drug allergies. They state noticing the redness and itchiness begin yesterday.  She also undergoes dialysis treatment three times weekly M, W, F, however has not undergone scheduled treatment today.      Past Medical History    Past Medical History:   Diagnosis Date    Acute on chronic respiratory failure with hypoxia (H) 02/21/2018    Anisocoria     Antisynthetase syndrome (H24) 2014    Anxiety     Aspergillus (H)     Aspergillus pneumonia (H) 11/20/2020    Benign essential hypertension     C. difficile colitis     Cytomegalovirus (CMV) viremia (H)     Dermatomyositis (H)     Dysplasia of cervix, low grade (ESTRADA 1)     EBV (Franklin-Barr virus) viremia     ESRD (end stage renal disease) on dialysis (H)     ILD (interstitial lung disease) (H)     Lung replaced by transplant (H)     Osteopenia     Paroxysmal atrial fibrillation (H)     Pneumocystis jiroveci pneumonia (H)     PONV (postoperative nausea and vomiting)     Post-menopause     Pulmonary hypertension (H)     Raynaud's disease     Seronegative rheumatoid arthritis (H)        Past Surgical History   Past Surgical History:   Procedure Laterality Date    BRONCHOSCOPY (RIGID OR FLEXIBLE), DIAGNOSTIC N/A 4/10/2018    Procedure: COMBINED  BRONCHOSCOPY (RIGID OR FLEXIBLE), LAVAGE;;  Surgeon: Mariposa Donohue MD;  [facility]    BRONCHOSCOPY (RIGID OR FLEXIBLE), DIAGNOSTIC N/A 12/23/2020    Procedure: BRONCHOSCOPY, WITH BRONCHOALVEOLAR LAVAGE;  Surgeon: Uri Bass MD;  [facility]    BRONCHOSCOPY (RIGID OR FLEXIBLE), DIAGNOSTIC N/A 5/26/2022    Procedure: BRONCHOSCOPY, WITH BRONCHOALVEOLAR LAVAGE;  Surgeon: Uri Bass MD;  [facility]: [facility]    BRONCHOSCOPY (RIGID OR FLEXIBLE), DIAGNOSTIC N/A 8/17/2023    Procedure: BRONCHOSCOPY, WITH BRONCHOALVEOLAR LAVAGE;  Surgeon: Esau Bruno MD;  [facility]    BRONCHOSCOPY (RIGID OR FLEXIBLE), DILATE BRONCHUS / TRACHEA N/A 10/11/2018    Procedure: BRONCHOSCOPY (RIGID OR FLEXIBLE), DILATE BRONCHUS / TRACHEA;  Flexible And Rigid Bronchoscopy and Dilation;  Surgeon: Wilber Lin MD;  [facility]    BRONCHOSCOPY FLEXIBLE N/A 3/13/2018    Procedure: BRONCHOSCOPY FLEXIBLE;  Flexible Bronchoscopy ;  Surgeon: Gissell Sanchez MD;  [facility]    BRONCHOSCOPY FLEXIBLE N/A 5/9/2018    Procedure: BRONCHOSCOPY FLEXIBLE;;  Surgeon: Wilber Lin MD;  [facility]    BRONCHOSCOPY FLEXIBLE AND RIGID N/A 9/10/2018    Procedure: BRONCHOSCOPY FLEXIBLE AND RIGID;  Flexible and Rigid Bronchoscopy with Balloon Dilation, tissue debulking with cryo, and Right mainstem bronchus stent placement;  Surgeon: Wilber Lin MD;  [facility]    BRONCHOSCOPY RIGID N/A 6/6/2018    Procedure: BRONCHOSCOPY RIGID;;  Surgeon: Lopez Macias MD;  [facility]    BRONCHOSCOPY, DILATE BRONCHUS, STENT BRONCHUS, COMBINED N/A 6/11/2018    Procedure: COMBINED BRONCHOSCOPY, DILATE BRONCHUS, STENT BRONCHUS;  Flexible Bronchoscopy, Balloon Dilation, Bronchial Washings;  Surgeon: Wilber Lin MD;  [facility]    BRONCHOSCOPY, DILATE BRONCHUS, STENT BRONCHUS, COMBINED Right 7/10/2018    Procedure: COMBINED BRONCHOSCOPY, DILATE BRONCHUS, STENT  BRONCHUS;  Flexible Bronchoscopy, Balloon Dilation, Bronchial Washings  ;  Surgeon: Wilber Lin MD;  [facility]    BRONCHOSCOPY, DILATE BRONCHUS, STENT BRONCHUS, COMBINED N/A 8/2/2018    Procedure: COMBINED BRONCHOSCOPY, DILATE BRONCHUS, STENT BRONCHUS;  Flexible Bronchoscopy, Bronchial Washings, Balloon Dilation;  Surgeon: Wilber Lin MD;  [facility]    BRONCHOSCOPY, DILATE BRONCHUS, STENT BRONCHUS, COMBINED N/A 8/20/2018    Procedure: COMBINED BRONCHOSCOPY, DILATE BRONCHUS, STENT BRONCHUS;  Flexible Bronchoscopy, Balloon Dilation;  Surgeon: Wilber Lin MD;  [facility]    BRONCHOSCOPY, DILATE BRONCHUS, STENT BRONCHUS, COMBINED N/A 10/29/2018    Procedure: Flexible Bronchoscopy, Balloon Dilation, Stent Revision, Airway Examination And Therapeutic Suctioning, Cyro Tumor Debulking;  Surgeon: Wilber Lin MD;  [facility]    BRONCHOSCOPY, DILATE BRONCHUS, STENT BRONCHUS, COMBINED N/A 11/20/2018    Procedure: Rigid Bronchoscopy, Stent Removal and dilitation;  Surgeon: Wilber Lin MD;  [facility]    BRONCHOSCOPY, DILATE BRONCHUS, STENT BRONCHUS, COMBINED N/A 12/14/2018    Procedure: Flexible And Rigid Bronchoscopy, Balloon Dilation, Bronchial Washing;  Surgeon: Wilber Lin MD;  [facility]    BRONCHOSCOPY, DILATE BRONCHUS, STENT BRONCHUS, COMBINED N/A 1/17/2019    Procedure: Flexible And Rigid Bronchoscopy, Balloon Dilation.;  Surgeon: Wilber Lin MD;  [facility]    BRONCHOSCOPY, DILATE BRONCHUS, STENT BRONCHUS, COMBINED N/A 3/7/2019    Procedure: Flexible and Rigid Bronchoscopy, Bronchial Washing, Balloon Dilation;  Surgeon: Wilber Lin MD;  [facility]    BRONCHOSCOPY, DILATE BRONCHUS, STENT BRONCHUS, COMBINED N/A 6/6/2019    Procedure: Rigid and Flexible Bronchoscopy, Balloon Dilation;  Surgeon: Wilber Lin MD;  [facility]    BRONCHOSCOPY, DILATE BRONCHUS, STENT BRONCHUS, COMBINED  N/A 10/11/2019    Procedure: Flexible and Rigid Bronchoscopy, Balloon Dilation, Bronchoalveolar Lagave;  Surgeon: Wilber Lin MD;  [facility]    BRONCHOSCOPY, DILATE BRONCHUS, STENT BRONCHUS, COMBINED N/A 2/19/2021    Procedure: BRONCHOSCOPY, flexible, airway dilation, and bronchial wash;  Surgeon: Wilber Lin MD;  [facility]    BRONCHOSCOPY, DILATE BRONCHUS, STENT BRONCHUS, COMBINED N/A 4/9/2021    Procedure: BRONCHOSCOPY, flexible and rigid, Airway suctioning;  Surgeon: Mati Norris MD;  [facility]    BRONCHOSCOPY, DILATE BRONCHUS, STENT BRONCHUS, COMBINED N/A 10/17/2023    Procedure: RIGID, flexible bronchoscopy with airway dilation;  Surgeon: Preet Patel MD;  [facility]    CV RIGHT HEART CATH MEASUREMENTS RECORDED N/A 3/10/2020    Procedure: CV RIGHT HEART CATH;  Surgeon: Wai Garcia MD;  Location:  HEART CARDIAC CATH LAB    ENT SURGERY      tonsillectomy as a child    ESOPHAGOSCOPY, GASTROSCOPY, DUODENOSCOPY (EGD), COMBINED N/A 10/29/2018    Procedure: COMBINED ESOPHAGOSCOPY, GASTROSCOPY, DUODENOSCOPY (EGD) with biopsies and polypectomy;  Surgeon: Chente Bloom MD;  [facility]    INSERT EXTRACORPORAL MEMBRANE OXYGENATOR ADULT (OUTSIDE OR) N/A 2/27/2018    Procedure: INSERT EXTRACORPORAL MEMBRANE OXYGENATOR ADULT (OUTSIDE OR);  INSERT EXTRACORPORAL MEMBRANE OXYGENATOR ADULT (OUTSIDE OR) ;  Surgeon: Toby Hernandez MD;  [facility]    IR CVC TUNNEL PLACEMENT > 5 YRS OF AGE  10/25/2019    IR DIALYSIS FISTULOGRAM LEFT  3/2/2021    IR DIALYSIS MECH THROMB, PTA  3/2/2021    IR FLUORO 0-1 HOUR  5/7/2021    IR GASTRO JEJUNOSTOMY TUBE PLACEMENT  2/16/2021    IR PARACENTESIS  1/8/2020    IR THORACENTESIS  9/13/2019    IR TRANSCATHETER BIOPSY  1/8/2020    LASER CO2 BRONCHOSCOPY N/A 4/30/2021    Procedure: Flexible and Rigid Bronchoscopy and Tissue Debulking with CO2 Laser Assistance;  Surgeon: Mati Norris MD;  [facility]    LASER CO2 BRONCHOSCOPY N/A 6/11/2021    Procedure: BRONCHOSCOPY, Flexible and Rigid Bronchoscopy, Tissue Debulking with cryo Assistance, airway dilation,;  Surgeon: Mati Norris MD;  [facility]    LASER CO2 BRONCHOSCOPY N/A 9/16/2021    Procedure: BRONCHOSCOPY, flexible and rigid, CO2 Laser Debulking;  Surgeon: Mati Norris MD;  [facility]    LASER CO2 BRONCHOSCOPY N/A 2/11/2022    Procedure: flexible, rigid bronchoscopy, tissue debulking, airway dilation, co2 laser, bronchoalveolar lavage;  Surgeon: Juana Baugh MD;  [facility]    no prior surgery      REMOVE EXTRACORPORAL MEMBRANE OXYGENATOR ADULT N/A 3/3/2018    Procedure: REMOVE EXTRACORPORAL MEMBRANE OXYGENATOR ADULT;  Removal of Right Femoral Venous and Right Axillary Arterial Extracorporeal Membrane Oxygenator;  Surgeon: Toby Hernandez MD;  [facility]    TRANSPLANT LUNG RECIPIENT SINGLE X2 Bilateral 3/1/2018    Procedure: TRANSPLANT LUNG RECIPIENT SINGLE X2;  Median Sternotomy, Extracorporeal Membrane Oxygenator, bilateral sequential lung transplant;  Surgeon: Toby Hernandez MD;  [facility]       Prior to Admission Medications   Prior to Admission Medications   Prescriptions Last Dose Informant Patient Reported? Taking?   Magnesium Cl-Calcium Carbonate (SLOW-MAG) 71.5-119 MG TBEC   No No   Sig: Take 2 tablets by mouth four times a week Take on non dialysis days T/Th/Sa/Su   acetaminophen (TYLENOL) 325 MG tablet  Self No No   Sig: Take 1 tablet (325 mg) by mouth every 4 hours as needed for mild pain or fever   acetylcysteine (MUCOMYST) 10 % nebulizer solution   No No   Sig: Inhale 4 mLs into the lungs 2 times daily as needed for mucolysis/respiratory distress   albuterol (PROVENTIL) (2.5 MG/3ML) 0.083% neb solution   No No   Sig: Take 1 vial (2.5 mg) by nebulization every 6 hours as needed for shortness of breath, wheezing or cough   amoxicillin-clavulanate (AUGMENTIN) 500-125 MG tablet   No No    Sig: Take one tablet daily. On dialysis days, take an additional tablet after dialysis (so one tablet in the morning and one in the evening on dialysis days).   azaTHIOprine (IMURAN) 50 MG tablet   No No   Sig: Take 1.5 tablets (75 mg) by mouth daily   azithromycin (ZITHROMAX) 250 MG tablet   No No   Sig: Take 1 tablet (250 mg) by mouth daily   budesonide (PULMICORT) 0.5 MG/2ML neb solution   No No   Sig: Take 2 mLs (0.5 mg) by nebulization daily as needed   calcium acetate (PHOSLO) 667 MG CAPS capsule   Yes No   Sig: Take 1 capsule (667 mg) by mouth 3 times daily (with meals) (largest meal)   cholecalciferol 50 MCG (2000 UT) CAPS   Yes No   Sig: cholecalciferol (vitamin D3) 50 mcg (2,000 unit)   fluticasone-salmeterol (ADVAIR) 250-50 MCG/ACT inhaler   No No   Sig: Inhale 1 puff into the lungs every 12 hours   lidocaine-prilocaine (EMLA) 2.5-2.5 % external cream   Yes No   Sig: lidocaine-prilocaine 2.5-2.5%   metoprolol tartrate (LOPRESSOR) 25 MG tablet   No No   Si tablet (25 mg) by Oral or Feeding Tube route 2 times daily   montelukast (SINGULAIR) 10 MG tablet   No No   Sig: Take 1 tablet (10 mg) by mouth At Bedtime   multivitamin RENAL (RENAVITE RX/NEPHROVITE) 1 tablet tablet   No No   Sig: Take 1 tablet by mouth daily   pantoprazole (PROTONIX) 40 MG EC tablet   No No   Sig: Take 1 tablet (40 mg) by mouth every morning (before breakfast)   posaconazole (NOXAFIL) 100 MG EC tablet   No No   Sig: Take 3 tablets (300 mg) by mouth daily   predniSONE (DELTASONE) 5 MG tablet   No No   Sig: Take 1 tablet (5 mg) by mouth every morning   sulfamethoxazole-trimethoprim (BACTRIM) 400-80 MG tablet   No No   Sig: Take 1 tablet by mouth Every Mon, Wed, Fri Morning   tacrolimus (GENERIC) 0.5 MG capsule   No No   Sig: Take 1 capsule (0.5 mg) by mouth every morning Total dose: 0.5 mg in AM and 1 mg in PM   tacrolimus (GENERIC) 1 MG capsule   No No   Sig: Take 1 capsule (1 mg) by mouth every evening Total dose: 0.5 mg in AM  and 1 mg in PM   valGANciclovir (VALCYTE) 450 MG tablet   No No   Sig: Take 1 tablet (450 mg) by mouth three times a week Take on dialysis days AFTER dialysis      Facility-Administered Medications: None        Review of Systems    The 10 point Review of Systems is negative other than noted in the HPI or here.     Physical Exam   Vital Signs: Temp: 97.9  F (36.6  C) Temp src: Oral BP: 117/71 Pulse: 105   Resp: (!) 31 SpO2: 100 % O2 Device: Nasal cannula Oxygen Delivery: 2 LPM  Weight: 0 lbs 0 oz    General Appearance: Laying supine in bed, appears in moderate distress d/t frequent cough, pruritus. Awake, alert, oriented to person, place, time. Pt observed to be itching skin of hands and wrists throughout visit.   Respiratory: Coarse breath sounds bilaterally, mildly labored breathing on 3L NC w/ frequent cough.  Cardiovascular: RRR, normal S1 and S2, no additional heart sounds, murmurs, gallops.   GI: Soft, nontender, nondistended. BS+  Skin: Skin appears diffusely erythematous across body.     Data   All labs and imaging reviewed over past 24 hours

## 2023-10-25 NOTE — ED TRIAGE NOTES
"Pt comes in with a cough that is getting worse. Pt was having a cough \"fit\" from 0000 until this morning that is wearing her out. Pt was seen at clinic for it but not feeling better. Hx: double lung transplant in 2018     Triage Assessment (Adult)       Row Name 10/25/23 1134          Triage Assessment    Airway WDL WDL        Respiratory WDL    Respiratory WDL X;all;cough     Cough Frequency frequent        Skin Circulation/Temperature WDL    Skin Circulation/Temperature WDL WDL        Cardiac WDL    Cardiac WDL X;all     Pulse Rate & Regularity tachycardic        Peripheral/Neurovascular WDL    Peripheral Neurovascular WDL WDL        Cognitive/Neuro/Behavioral WDL    Cognitive/Neuro/Behavioral WDL WDL                     "

## 2023-10-25 NOTE — PHARMACY-VANCOMYCIN DOSING SERVICE
"Pharmacy Vancomycin Initial Note  Date of Service 2023  Patient's  1960  62 year old, female    Indication: Healthcare-Associated Pneumonia and Skin and Soft Tissue Infection    Current estimated CrCl = iHD    Creatinine for last 3 days  10/24/2023: 10:33 AM Creatinine 3.43 mg/dL  10/25/2023:  1:56 PM Creatinine 4.90 mg/dL    Recent Vancomycin Level(s) for last 3 days  No results found for requested labs within last 3 days.      Vancomycin IV Administrations (past 72 hours)        No vancomycin orders with administrations in past 72 hours.                    Nephrotoxins and other renal medications (From now, onward)      Start     Dose/Rate Route Frequency Ordered Stop    10/25/23 2000  vancomycin (VANCOCIN) 1,250 mg in 0.9% NaCl 250 mL intermittent infusion         20 mg/kg × 60.3 kg  over 90 Minutes Intravenous ONCE 10/25/23 1452      10/25/23 1500  piperacillin-tazobactam (ZOSYN) 2.25 g vial to attach to  ml bag        Note to Pharmacy: For [facility], [facility] and [facility]: For Zosyn-naive patients, use the \"Zosyn initial dose + extended infusion\" order panel.    2.25 g  over 30 Minutes Intravenous ONCE 10/25/23 1440      10/25/23 1448  vancomycin place jordan - receiving intermittent dosing         1 each Intravenous SEE ADMIN INSTRUCTIONS 10/25/23 1448              Contrast Orders - past 72 hours (72h ago, onward)      None                Plan:  Start vancomycin  1250 mg IV x1, then intermittent dosing. Missed iHD this AM, but no plans for iHD today.  Vancomycin monitoring method: Renal Replacement Therapy (iHD)  Vancomycin therapeutic monitoring goal: 15-20 mg/L  Pharmacy will check vancomycin levels as appropriate in 1-3 Days.    Serum creatinine levels will be ordered daily for the first week of therapy and at least twice weekly for subsequent weeks.      Danielle Mar, [facility]    "

## 2023-10-25 NOTE — CONSULTS
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 Nephrology consulted 10/25/2023 for dialysis needs while in house.    Patient evaluated while in ED. Family at bedside. Sounds like last HD session was Monday and went well. No major issues with access or her treatment. She has a LUE AVG that has required multiple interventions in the past (thrombosis/stenosis issues) but reports working well for the last 3 months or so.    She has had poor PO intake over the last few days. She is just about at her dry weight/reports to me that she feels slightly volume down. On exam she does have some LE edema and Bps are reasonable in the low 100's. She was satting 100% on 2L NC at the time of my eval.     She follows with [facility] in [facility] [address] [place].    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 Will plan for routine run Thursday.  -MBD: Calcium 8.3, Phos out of date. On PhosLo 667mg w/ meals.  Please update phos with AM labs  -Anemia: Hemoglobin 10.1. Above goal for EPO  -We recommend Daily Nephrocap (replaces water soluble vitamins lost with HD) in all patients on RRT  -Avoid Lovenox, Gadolinium (MRI contrast), Morphine, Magnesium or Phos containing enemas    PHYSICAL EXAM:   Temp  Av.9  F (36.6  C)  Min: 97.9  F (36.6  C)  Max: 97.9  F (36.6  C)      Pulse  Av.8  Min: 98  Max: 117 Resp  Av  Min: 17  Max: 47  SpO2  Av.8 %  Min: 93 %  Max: 100 %       /71   Pulse 105   Temp 97.9  F (36.6  C) (Oral)   Resp (!) 31   LMP 2014 (Exact Date)   SpO2 100%       Admit     GENERAL APPEARANCE: no distress, alert and awake  EYES: no scleral icterus, pupils equal  CV:   - Edema: 1+ b/l LE  GI: soft, nontender, normal bowel sounds  MS: no evidence of inflammation in joints, no muscle tenderness  : no rivas  SKIN: no rash, warm, dry, no cyanosis  NEURO: face symmetric, no asterixis    PAST MEDICAL HISTORY:  Past Medical History:   Diagnosis Date    Acute on chronic respiratory failure with hypoxia (H) 2018    Anisocoria     Antisynthetase syndrome (H24)     Anxiety     Aspergillus (H)     Aspergillus pneumonia (H) 2020    Benign essential hypertension     C. difficile colitis     Cytomegalovirus (CMV) viremia (H)     Dermatomyositis (H)     Dysplasia of cervix, low grade (ESTRADA 1)     EBV (Franklin-Barr virus) viremia     ESRD (end stage renal disease) on dialysis (H)     ILD (interstitial lung disease) (H)     Lung replaced by transplant (H)     Osteopenia     Paroxysmal atrial fibrillation (H)     Pneumocystis jiroveci pneumonia (H)     PONV (postoperative nausea and vomiting)     Post-menopause     Pulmonary hypertension (H)     Raynaud's disease     Seronegative rheumatoid arthritis (H)        Past Surgical History:   Procedure Laterality Date    BRONCHOSCOPY (RIGID OR FLEXIBLE), DIAGNOSTIC N/A 4/10/2018    Procedure: COMBINED BRONCHOSCOPY (RIGID OR FLEXIBLE),  LAVAGE;;  Surgeon: Mariposa Donohue MD;  [facility]: [facility]    BRONCHOSCOPY (RIGID OR FLEXIBLE), DIAGNOSTIC N/A 12/23/2020    Procedure: BRONCHOSCOPY, WITH BRONCHOALVEOLAR LAVAGE;  Surgeon: Uri Bass MD;  [facility]    BRONCHOSCOPY (RIGID OR FLEXIBLE), DIAGNOSTIC N/A 5/26/2022    Procedure: BRONCHOSCOPY, WITH BRONCHOALVEOLAR LAVAGE;  Surgeon: Uri Bass MD;  [facility]    BRONCHOSCOPY (RIGID OR FLEXIBLE), DIAGNOSTIC N/A 8/17/2023    Procedure: BRONCHOSCOPY, WITH BRONCHOALVEOLAR LAVAGE;  Surgeon: Esau Bruno MD;  [facility]    BRONCHOSCOPY (RIGID OR FLEXIBLE), DILATE BRONCHUS / TRACHEA N/A 10/11/2018    Procedure: BRONCHOSCOPY (RIGID OR FLEXIBLE), DILATE BRONCHUS / TRACHEA;  Flexible And Rigid Bronchoscopy and Dilation;  Surgeon: Wilber Lin MD;  [facility]    BRONCHOSCOPY FLEXIBLE N/A 3/13/2018    Procedure: BRONCHOSCOPY FLEXIBLE;  Flexible Bronchoscopy ;  Surgeon: Gissell Sanchez MD;  [facility]    BRONCHOSCOPY FLEXIBLE N/A 5/9/2018    Procedure: BRONCHOSCOPY FLEXIBLE;;  Surgeon: Wilber Lin MD;  [facility]    BRONCHOSCOPY FLEXIBLE AND RIGID N/A 9/10/2018    Procedure: BRONCHOSCOPY FLEXIBLE AND RIGID;  Flexible and Rigid Bronchoscopy with Balloon Dilation, tissue debulking with cryo, and Right mainstem bronchus stent placement;  Surgeon: Wilber Lin MD;  [facility]    BRONCHOSCOPY RIGID N/A 6/6/2018    Procedure: BRONCHOSCOPY RIGID;;  Surgeon: Lopez Macias MD;  [facility]    BRONCHOSCOPY, DILATE BRONCHUS, STENT BRONCHUS, COMBINED N/A 6/11/2018    Procedure: COMBINED BRONCHOSCOPY, DILATE BRONCHUS, STENT BRONCHUS;  Flexible Bronchoscopy, Balloon Dilation, Bronchial Washings;  Surgeon: Wilber Lin MD;  [facility]    BRONCHOSCOPY, DILATE BRONCHUS, STENT BRONCHUS, COMBINED Right 7/10/2018    Procedure: COMBINED BRONCHOSCOPY, DILATE BRONCHUS, STENT BRONCHUS;  Flexible Bronchoscopy, Balloon  Dilation, Bronchial Washings  ;  Surgeon: Wilber Lin MD;  [facility]    BRONCHOSCOPY, DILATE BRONCHUS, STENT BRONCHUS, COMBINED N/A 8/2/2018    Procedure: COMBINED BRONCHOSCOPY, DILATE BRONCHUS, STENT BRONCHUS;  Flexible Bronchoscopy, Bronchial Washings, Balloon Dilation;  Surgeon: Wilber Lin MD;  [facility]    BRONCHOSCOPY, DILATE BRONCHUS, STENT BRONCHUS, COMBINED N/A 8/20/2018    Procedure: COMBINED BRONCHOSCOPY, DILATE BRONCHUS, STENT BRONCHUS;  Flexible Bronchoscopy, Balloon Dilation;  Surgeon: Wilber Lin MD;  [facility]    BRONCHOSCOPY, DILATE BRONCHUS, STENT BRONCHUS, COMBINED N/A 10/29/2018    Procedure: Flexible Bronchoscopy, Balloon Dilation, Stent Revision, Airway Examination And Therapeutic Suctioning, Cyro Tumor Debulking;  Surgeon: Wilber Lin MD;  [facility]    BRONCHOSCOPY, DILATE BRONCHUS, STENT BRONCHUS, COMBINED N/A 11/20/2018    Procedure: Rigid Bronchoscopy, Stent Removal and dilitation;  Surgeon: Wilber Lin MD;  [facility]    BRONCHOSCOPY, DILATE BRONCHUS, STENT BRONCHUS, COMBINED N/A 12/14/2018    Procedure: Flexible And Rigid Bronchoscopy, Balloon Dilation, Bronchial Washing;  Surgeon: Wilber Lin MD;  [facility]    BRONCHOSCOPY, DILATE BRONCHUS, STENT BRONCHUS, COMBINED N/A 1/17/2019    Procedure: Flexible And Rigid Bronchoscopy, Balloon Dilation.;  Surgeon: Wilber Lin MD;  [facility]    BRONCHOSCOPY, DILATE BRONCHUS, STENT BRONCHUS, COMBINED N/A 3/7/2019    Procedure: Flexible and Rigid Bronchoscopy, Bronchial Washing, Balloon Dilation;  Surgeon: Wilber Lin MD;  [facility]    BRONCHOSCOPY, DILATE BRONCHUS, STENT BRONCHUS, COMBINED N/A 6/6/2019    Procedure: Rigid and Flexible Bronchoscopy, Balloon Dilation;  Surgeon: Wilber Lin MD;  [facility]    BRONCHOSCOPY, DILATE BRONCHUS, STENT BRONCHUS, COMBINED N/A 10/11/2019    Procedure: Flexible and  Rigid Bronchoscopy, Balloon Dilation, Bronchoalveolar Lagave;  Surgeon: Wilber Lin MD;  [facility]    BRONCHOSCOPY, DILATE BRONCHUS, STENT BRONCHUS, COMBINED N/A 2/19/2021    Procedure: BRONCHOSCOPY, flexible, airway dilation, and bronchial wash;  Surgeon: Wilber Lin MD;  [facility]    BRONCHOSCOPY, DILATE BRONCHUS, STENT BRONCHUS, COMBINED N/A 4/9/2021    Procedure: BRONCHOSCOPY, flexible and rigid, Airway suctioning;  Surgeon: Mati Norris MD;  [facility]    BRONCHOSCOPY, DILATE BRONCHUS, STENT BRONCHUS, COMBINED N/A 10/17/2023    Procedure: RIGID, flexible bronchoscopy with airway dilation;  Surgeon: Preet Patel MD;  [facility]    CV RIGHT HEART CATH MEASUREMENTS RECORDED N/A 3/10/2020    Procedure: CV RIGHT HEART CATH;  Surgeon: Wai Garcia MD;  [facility]    ENT SURGERY      tonsillectomy as a child    ESOPHAGOSCOPY, GASTROSCOPY, DUODENOSCOPY (EGD), COMBINED N/A 10/29/2018    Procedure: COMBINED ESOPHAGOSCOPY, GASTROSCOPY, DUODENOSCOPY (EGD) with biopsies and polypectomy;  Surgeon: Chente Bloom MD;  [facility]    INSERT EXTRACORPORAL MEMBRANE OXYGENATOR ADULT (OUTSIDE OR) N/A 2/27/2018    Procedure: INSERT EXTRACORPORAL MEMBRANE OXYGENATOR ADULT (OUTSIDE OR);  INSERT EXTRACORPORAL MEMBRANE OXYGENATOR ADULT (OUTSIDE OR) ;  Surgeon: Toby Hernandez MD;  [facility]    IR CVC TUNNEL PLACEMENT > 5 YRS OF AGE  10/25/2019    IR DIALYSIS FISTULOGRAM LEFT  3/2/2021    IR DIALYSIS MECH THROMB, PTA  3/2/2021    IR FLUORO 0-1 HOUR  5/7/2021    IR GASTRO JEJUNOSTOMY TUBE PLACEMENT  2/16/2021    IR PARACENTESIS  1/8/2020    IR THORACENTESIS  9/13/2019    IR TRANSCATHETER BIOPSY  1/8/2020    LASER CO2 BRONCHOSCOPY N/A 4/30/2021    Procedure: Flexible and Rigid Bronchoscopy and Tissue Debulking with CO2 Laser Assistance;  Surgeon: Mati Norris MD;  [facility]    LASER CO2 BRONCHOSCOPY N/A 6/11/2021     Procedure: BRONCHOSCOPY, Flexible and Rigid Bronchoscopy, Tissue Debulking with cryo Assistance, airway dilation,;  Surgeon: Mati Norris MD;  [facility]    LASER CO2 BRONCHOSCOPY N/A 9/16/2021    Procedure: BRONCHOSCOPY, flexible and rigid, CO2 Laser Debulking;  Surgeon: Mati Norris MD;  [facility]    LASER CO2 BRONCHOSCOPY N/A 2/11/2022    Procedure: flexible, rigid bronchoscopy, tissue debulking, airway dilation, co2 laser, bronchoalveolar lavage;  Surgeon: Juana Baugh MD;  [facility]    no prior surgery      REMOVE EXTRACORPORAL MEMBRANE OXYGENATOR ADULT N/A 3/3/2018    Procedure: REMOVE EXTRACORPORAL MEMBRANE OXYGENATOR ADULT;  Removal of Right Femoral Venous and Right Axillary Arterial Extracorporeal Membrane Oxygenator;  Surgeon: Toby Hernandez MD;  [facility]    TRANSPLANT LUNG RECIPIENT SINGLE X2 Bilateral 3/1/2018    Procedure: TRANSPLANT LUNG RECIPIENT SINGLE X2;  Median Sternotomy, Extracorporeal Membrane Oxygenator, bilateral sequential lung transplant;  Surgeon: Toby Hernandez MD;  [facility]        MEDICATIONS:  PTA Meds  Prior to Admission medications    Medication Sig Last Dose Taking? Auth Provider Long Term End Date   acetaminophen (TYLENOL) 325 MG tablet Take 1 tablet (325 mg) by mouth every 4 hours as needed for mild pain or fever   Leelee Huang MD     acetylcysteine (MUCOMYST) 10 % nebulizer solution Inhale 4 mLs into the lungs 2 times daily as needed for mucolysis/respiratory distress   Ame Chow PA-C     albuterol (PROVENTIL) (2.5 MG/3ML) 0.083% neb solution Take 1 vial (2.5 mg) by nebulization every 6 hours as needed for shortness of breath, wheezing or cough   Ame Chow PA-C Yes    azaTHIOprine (IMURAN) 50 MG tablet Take 1.5 tablets (75 mg) by mouth daily   Ame Chow PA-C Yes    azithromycin (ZITHROMAX) 250 MG tablet Take 1 tablet (250 mg) by mouth daily   Ame Chow PA-C      budesonide (PULMICORT) 0.5 MG/2ML neb solution Take 2 mLs (0.5 mg) by nebulization daily as needed   Ame Chow PA-C Yes    calcium acetate (PHOSLO) 667 MG CAPS capsule Take 1 capsule (667 mg) by mouth 3 times daily (with meals) (largest meal)   Ame Chow PA-C     cholecalciferol 50 MCG (2000 UT) CAPS cholecalciferol (vitamin D3) 50 mcg (2,000 unit)   Reported, Patient     fluticasone-salmeterol (ADVAIR) 250-50 MCG/ACT inhaler Inhale 1 puff into the lungs every 12 hours   Ame Chow PA-C Yes    lidocaine-prilocaine (EMLA) 2.5-2.5 % external cream lidocaine-prilocaine 2.5-2.5%   Reported, Patient No    Magnesium Cl-Calcium Carbonate (SLOW-MAG) 71.5-119 MG TBEC Take 2 tablets by mouth four times a week Take on non dialysis days T/Th/Sa/Su   Ame Chow PA-C No    metoprolol tartrate (LOPRESSOR) 25 MG tablet 1 tablet (25 mg) by Oral or Feeding Tube route 2 times daily   Ame Chow PA-C Yes    montelukast (SINGULAIR) 10 MG tablet Take 1 tablet (10 mg) by mouth At Bedtime   Ame Chow PA-C Yes    multivitamin RENAL (RENAVITE RX/NEPHROVITE) 1 tablet tablet Take 1 tablet by mouth daily   Ame Chow PA-C     pantoprazole (PROTONIX) 40 MG EC tablet Take 1 tablet (40 mg) by mouth every morning (before breakfast)   Yenni Graham MD     posaconazole (NOXAFIL) 100 MG EC tablet Take 3 tablets (300 mg) by mouth daily   Ame Chow PA-C     predniSONE (DELTASONE) 5 MG tablet Take 1 tablet (5 mg) by mouth every morning   Ame Chow PA-C     sulfamethoxazole-trimethoprim (BACTRIM) 400-80 MG tablet Take 1 tablet by mouth Every Mon, Wed, Fri Morning   Ame Chow PA-C No    tacrolimus (GENERIC) 0.5 MG capsule Take 1 capsule (0.5 mg) by mouth every morning Total dose: 0.5 mg in AM and 1 mg in PM   Ame Chow PA-C No    tacrolimus (GENERIC) 1 MG capsule Take 1 capsule (1 mg) by mouth every evening Total dose: 0.5  mg in AM and 1 mg in PM   Ame Chow PA-C No    valGANciclovir (VALCYTE) 450 MG tablet Take 1 tablet (450 mg) by mouth three times a week Take on dialysis days AFTER dialysis   Ame Chow PA-C Yes         ALLERGIES:    No Known Allergies    REVIEW OF SYSTEMS:  Pertinent positives listed in HPI. Complete ROS otherwise negative.    SOCIAL HISTORY:   Social History     Socioeconomic History    Marital status:      Spouse name: Not on file    Number of children: Not on file    Years of education: Not on file    Highest education level: Not on file   Occupational History    Not on file   Tobacco Use    Smoking status: Never    Smokeless tobacco: Never   Substance and Sexual Activity    Alcohol use: No     Alcohol/week: 1.0 standard drink of alcohol     Types: 1 Glasses of wine per week    Drug use: No    Sexual activity: Not on file   Other Topics Concern    Parent/sibling w/ CABG, MI or angioplasty before 65F 55M? No   Social History Narrative    3/6/2019 - Lives with . Has three daughters. Four grandchildren (two ). No pets. Travelled previously to [facility]. Has visited [place] several times.      Social Determinants of Health     Financial Resource Strain: Not on file   Food Insecurity: Not on file   Transportation Needs: Not on file   Physical Activity: Not on file   Stress: Not on file   Social Connections: Not on file   Interpersonal Safety: Not on file   Housing Stability: Not on file       FAMILY MEDICAL HISTORY:   Family History   Problem Relation Age of Onset    Hypertension Mother     Arthritis Mother     Pancreatic Cancer Father     Diabetes Father        LABS:   BMP  Recent Labs   Lab Test 10/25/23  1356 10/24/23  1033 10/17/23  1035 10/17/23  1011 10/17/23  0939 10/11/23  0806 10/04/23  0810 23  0710 23  0827 23  0828 23  0952 23  0848 23  1034   * 138 139 139  --   --   --  139  --  138 137   < > 138   POTASSIUM  4.4 3.7 3.8 4.4   < >  --   --  4.5  --  4.6 5.0   < > 4.2   CHLORIDE 96* 96*  --  99  --  100   < > 97*   < > 99 98   < > 98   CO2 26 31*  --  30*  --   --   --  32*  --  26 26   < > 30*   ANIONGAP 11 11  --  10  --   --   --  10  --  13 13   < > 10   BUN 24.6* 14.6  --  22.1  --   --   --  22.2  --  48.1* 46.2*   < > 41.7*   CR 4.90* 3.43*  --  3.55*  --   --   --  3.79*  --  6.35* 6.42*   < > 3.70*   GFRESTIMATED 10* 15*  --  14*  --   --   --  13*  --  7* 7*   < > 13*   MAG  --  1.9  --  1.9  --   --   --  1.8  --  2.0 2.0   < > 1.9   PROTTOTAL 6.0* 7.4  --   --   --   --   --   --   --   --  7.9  --  7.7    < > = values in this interval not displayed.       CBC  Recent Labs   Lab Test 10/25/23  1356 10/24/23  1033 10/17/23  1035 10/17/23  1011 09/07/23  0710   HGB 10.1* 10.9* 11.2* 9.5* 10.7*   WBC 11.6* 3.9*  --  2.4* 5.3   HCT 30.8* 33.4*  --  31.3* 34.5*    101*  --  109* 105*   * 165  --  130* 187       ANEMIA  Recent Labs   Lab Test 10/25/23  1356 10/24/23  1033 10/17/23  1035 10/17/23  1011 02/04/21  0310 02/03/21  0415 12/14/18  0951 12/13/18  1033 08/02/18  1100 08/01/18  0921 05/16/18  1006 05/08/18  0709   HGB 10.1* 10.9* 11.2* 9.5*   < > 10.0*   < > 6.6*   < > 7.6*   < > 9.6*   IRONSAT  --   --   --   --   --  36  --  7*  --  37  --  18   YOLA  --   --   --   --   --   --   --  302*  --  571*  --   --     < > = values in this interval not displayed.       MBD  Recent Labs   Lab Test 10/25/23  1356 10/24/23  1033 10/17/23  1011 09/07/23  0710 08/08/23  0828 07/11/23  0952 04/06/23  0725 02/09/23  1034 05/05/21  0707 05/03/21  0640 04/08/21  0722 03/29/21  0000 03/04/21  0611 03/01/21  1559 03/01/21  0637   CHELSEY 8.3* 9.0 8.9 9.4   < > 10.0  10.0   < > 9.6   < > 7.8*   < > 7.9 7.9*   < > 8.4*   ALBUMIN 3.0* 3.6  --   --   --  3.9  4.0  --  4.1   < >  --   --  3.5  --   --  2.8*   PHOS  --   --   --   --   --   --   --   --   --  5.9*  --  5.8 6.1*  --  5.0*    < > = values in this interval  not displayed.        URINE STUDIES  Recent Labs   Lab Test 01/24/21  1729 10/21/19  2240 09/12/19  0125 08/07/19  1512   COLOR Yellow Yellow Light Yellow Yellow   APPEARANCE Slightly Cloudy Cloudy Clear Clear   URINEGLC Negative Negative Negative Negative   URINEBILI Negative Negative Negative Negative   URINEKETONE 5* Negative 10* Negative   SG 1.023 1.018 1.008 1.016   UBLD Small* Trace* Negative Negative   URINEPH 5.0 5.0 7.5* 7.0   PROTEIN 30* 30* 30* 100*   NITRITE Negative Negative Negative Negative   LEUKEST Moderate* Large* Negative Trace*   RBCU 26* 25* <1 10*   WBCU 34* 115* 3 19*     Recent Labs   Lab Test 08/07/19  1512   [facility] 2.47*       40 minutes spent on the date of the encounter doing chart review, history and exam, documentation and further activities as noted above

## 2023-10-25 NOTE — TELEPHONE ENCOUNTER
Patient Call: General  Route to LPN    Reason for call: Pt seen here I clinic yesterday  she is not doing good   wants to bring her here to ER     Call back needed? Yes    Return Call Needed  Same as documented in contacts section  When to return call?: Same day: Route High Priority

## 2023-10-26 PROBLEM — A42.9 ACTINOMYCES INFECTION: Status: ACTIVE | Noted: 2023-01-01

## 2023-10-26 NOTE — PROGRESS NOTES
Nephrology Initial Consult  October 26, 2023      Kecia Blue MRN:[id number] YOB: 1960  Date of Admission:10/25/2023  Primary care provider: Ame Chow  Requesting physician: Kuldip Emanuel MD    ASSESSMENT AND RECOMMENDATIONS:   Kecia Blue is a 62 year old year old female with PMHx most significant for Bilateral Lung Transplant 3/1/2018 w/ multiple post transplant infectious complications (Aspergillus, EBV, CMV), ESKD on iHD (since 2019 and 2/2 CNI toxicity) via LUE AVG who presents with cough and feeling unwell.     # ESKD: pt dialyzes MWF at [facility] (ph [phone], f [phone]) with Dr. Glasgow. Run time: 3.5 hrs. EDW 59.7 kg. Access: L AVF.  - Dialysis today, next run likely on Saturday    # BP/Volume: EDW 59.7 kg, pt maintains this very well. PTA metoprolol 25 mg bid, on occasion pt requires prn midodrine however generally her BP's are in 150-170's. Minimal UOP  - CXR with pulm edema, O2 95% on 2L, appears hypervolemic with 1+ edema  - very soft pressures, was bolused 1L  - agree with being judicious with fluids   - no UF today, using albumin prime and prn to support pressures during HD  - ordered prn midodrine 10 mg    pAfib: on BB, does not appear to be on AC    # L upper and lower extremity > R upper and lower extremity:  - US ruled out DVT    # Anemia of CKD: hgb has been 10-11's. PTA on Mirerca 75mcg [id number] (last dose 9/27, due for next dose); venofer 50 mg qweek.  - hold TASH for hgb > 10; if drops below 10 g/dL, will give epogen equivalent dose of 4600 units per HD  - hold iron in setting of infection    # BMD: PTA on PO calcitriol 0.75 mcg MWF, Phoslo 2 tabs tid WM    # ID: on multiple antibiotics and anti-fungals, likely a drug reaction (vanc?) as pt became red over entire body and pruritic, much improved with benadryl    Recommendations were communicated to primary team via this note and vocera    Seen and discussed with Dr. 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 Pt reports she's been trying to treat through this cough and SOB for a month or so including being on IV minocycline. She is not on oxygen at baseline but has been requiring 2L at home for the last several weeks. She's also not been eating well and started feeling nauseated and therefore decided to come in. She was started on multiple anti-biotics and anti-fungals and developed likely a drug reaction  (vanc?) with significant redness and pruritus covering body, much improved with benadryl.     Outpatient dialysis information was obtained and reviewed. Pt is significantly hypotensive today wherein her baseline is hypertensive in 160-170's. Pressures have not improved in spite of 1L fluid. However appreciate team exercising caution with fluids given pulm edema on CXR and minimal UOP. Pt is seen on dialysis, we are not pulling any fluid today and are using albumin prime and albumin throughout to maintain BP's. Pt endorse cough and mild SOB. Denies current n/v, CP, chills. Has poor appetite.        PAST MEDICAL HISTORY:  Reviewed with patient on 10/26/2023     Past Medical History:   Diagnosis Date    Acute on chronic respiratory failure with hypoxia (H) 02/21/2018    Anisocoria     Antisynthetase syndrome (H24) 2014    Anxiety     Aspergillus (H)     Aspergillus pneumonia (H) 11/20/2020    Benign essential hypertension     C. difficile colitis     Cytomegalovirus (CMV) viremia (H)     Dermatomyositis (H)     Dysplasia of cervix, low grade (ESTRADA 1)     EBV (Franklin-Barr virus) viremia      ESRD (end stage renal disease) on dialysis (H)     ILD (interstitial lung disease) (H)     Lung replaced by transplant (H)     Osteopenia     Paroxysmal atrial fibrillation (H)     Pneumocystis jiroveci pneumonia (H)     PONV (postoperative nausea and vomiting)     Post-menopause     Pulmonary hypertension (H)     Raynaud's disease     Seronegative rheumatoid arthritis (H)        Past Surgical History:   Procedure Laterality Date    BRONCHOSCOPY (RIGID OR FLEXIBLE), DIAGNOSTIC N/A 4/10/2018    Procedure: COMBINED BRONCHOSCOPY (RIGID OR FLEXIBLE), LAVAGE;;  Surgeon: Mariposa Donohue MD;  Location:  GI    BRONCHOSCOPY (RIGID OR FLEXIBLE), DIAGNOSTIC N/A 12/23/2020    Procedure: BRONCHOSCOPY, WITH BRONCHOALVEOLAR LAVAGE;  Surgeon: Uri Bass MD;  [facility]: [facility]    BRONCHOSCOPY (RIGID OR FLEXIBLE), DIAGNOSTIC N/A 5/26/2022    Procedure: BRONCHOSCOPY, WITH BRONCHOALVEOLAR LAVAGE;  Surgeon: Uri Bass MD;  Location:  GI    BRONCHOSCOPY (RIGID OR FLEXIBLE), DIAGNOSTIC N/A 8/17/2023    Procedure: BRONCHOSCOPY, WITH BRONCHOALVEOLAR LAVAGE;  Surgeon: Esau Bruno MD;  Location:  GI    BRONCHOSCOPY (RIGID OR FLEXIBLE), DILATE BRONCHUS / TRACHEA N/A 10/11/2018    Procedure: BRONCHOSCOPY (RIGID OR FLEXIBLE), DILATE BRONCHUS / TRACHEA;  Flexible And Rigid Bronchoscopy and Dilation;  Surgeon: Wilber Lin MD;  [facility]    BRONCHOSCOPY FLEXIBLE N/A 3/13/2018    Procedure: BRONCHOSCOPY FLEXIBLE;  Flexible Bronchoscopy ;  Surgeon: Gissell Sanchez MD;  [facility]    BRONCHOSCOPY FLEXIBLE N/A 5/9/2018    Procedure: BRONCHOSCOPY FLEXIBLE;;  Surgeon: Wilber Lin MD;  [facility]    BRONCHOSCOPY FLEXIBLE AND RIGID N/A 9/10/2018    Procedure: BRONCHOSCOPY FLEXIBLE AND RIGID;  Flexible and Rigid Bronchoscopy with Balloon Dilation, tissue debulking with cryo, and Right mainstem bronchus stent placement;  Surgeon: Wilber Lin MD;  Location:  OR     BRONCHOSCOPY RIGID N/A 6/6/2018    Procedure: BRONCHOSCOPY RIGID;;  Surgeon: Lopez Macias MD;  [facility]    BRONCHOSCOPY, DILATE BRONCHUS, STENT BRONCHUS, COMBINED N/A 6/11/2018    Procedure: COMBINED BRONCHOSCOPY, DILATE BRONCHUS, STENT BRONCHUS;  Flexible Bronchoscopy, Balloon Dilation, Bronchial Washings;  Surgeon: Wilber Lin MD;  [facility]    BRONCHOSCOPY, DILATE BRONCHUS, STENT BRONCHUS, COMBINED Right 7/10/2018    Procedure: COMBINED BRONCHOSCOPY, DILATE BRONCHUS, STENT BRONCHUS;  Flexible Bronchoscopy, Balloon Dilation, Bronchial Washings  ;  Surgeon: Wilber Lin MD;  [facility]    BRONCHOSCOPY, DILATE BRONCHUS, STENT BRONCHUS, COMBINED N/A 8/2/2018    Procedure: COMBINED BRONCHOSCOPY, DILATE BRONCHUS, STENT BRONCHUS;  Flexible Bronchoscopy, Bronchial Washings, Balloon Dilation;  Surgeon: Wilber Lin MD;  [facility]    BRONCHOSCOPY, DILATE BRONCHUS, STENT BRONCHUS, COMBINED N/A 8/20/2018    Procedure: COMBINED BRONCHOSCOPY, DILATE BRONCHUS, STENT BRONCHUS;  Flexible Bronchoscopy, Balloon Dilation;  Surgeon: Wilber Lin MD;  [facility]    BRONCHOSCOPY, DILATE BRONCHUS, STENT BRONCHUS, COMBINED N/A 10/29/2018    Procedure: Flexible Bronchoscopy, Balloon Dilation, Stent Revision, Airway Examination And Therapeutic Suctioning, Cyro Tumor Debulking;  Surgeon: Wilber Lin MD;  [facility]    BRONCHOSCOPY, DILATE BRONCHUS, STENT BRONCHUS, COMBINED N/A 11/20/2018    Procedure: Rigid Bronchoscopy, Stent Removal and dilitation;  Surgeon: Wilber Lin MD;  [facility]    BRONCHOSCOPY, DILATE BRONCHUS, STENT BRONCHUS, COMBINED N/A 12/14/2018    Procedure: Flexible And Rigid Bronchoscopy, Balloon Dilation, Bronchial Washing;  Surgeon: Wilber Lin MD;  [facility]    BRONCHOSCOPY, DILATE BRONCHUS, STENT BRONCHUS, COMBINED N/A 1/17/2019    Procedure: Flexible And Rigid Bronchoscopy, Balloon  Dilation.;  Surgeon: Wilber Lin MD;  [facility]    BRONCHOSCOPY, DILATE BRONCHUS, STENT BRONCHUS, COMBINED N/A 3/7/2019    Procedure: Flexible and Rigid Bronchoscopy, Bronchial Washing, Balloon Dilation;  Surgeon: Wilber Lin MD;  [facility]    BRONCHOSCOPY, DILATE BRONCHUS, STENT BRONCHUS, COMBINED N/A 6/6/2019    Procedure: Rigid and Flexible Bronchoscopy, Balloon Dilation;  Surgeon: Wilber Lin MD;  [facility]    BRONCHOSCOPY, DILATE BRONCHUS, STENT BRONCHUS, COMBINED N/A 10/11/2019    Procedure: Flexible and Rigid Bronchoscopy, Balloon Dilation, Bronchoalveolar Lagave;  Surgeon: Wilber Lin MD;  [facility]    BRONCHOSCOPY, DILATE BRONCHUS, STENT BRONCHUS, COMBINED N/A 2/19/2021    Procedure: BRONCHOSCOPY, flexible, airway dilation, and bronchial wash;  Surgeon: Wilber Lin MD;  [facility]    BRONCHOSCOPY, DILATE BRONCHUS, STENT BRONCHUS, COMBINED N/A 4/9/2021    Procedure: BRONCHOSCOPY, flexible and rigid, Airway suctioning;  Surgeon: Mati Norris MD;  [facility]    BRONCHOSCOPY, DILATE BRONCHUS, STENT BRONCHUS, COMBINED N/A 10/17/2023    Procedure: RIGID, flexible bronchoscopy with airway dilation;  Surgeon: Preet Patel MD;  [facility]    CV RIGHT HEART CATH MEASUREMENTS RECORDED N/A 3/10/2020    Procedure: CV RIGHT HEART CATH;  Surgeon: Wai Garcia MD;  [facility]    ENT SURGERY      tonsillectomy as a child    ESOPHAGOSCOPY, GASTROSCOPY, DUODENOSCOPY (EGD), COMBINED N/A 10/29/2018    Procedure: COMBINED ESOPHAGOSCOPY, GASTROSCOPY, DUODENOSCOPY (EGD) with biopsies and polypectomy;  Surgeon: Chente Bloom MD;  [facility]    INSERT EXTRACORPORAL MEMBRANE OXYGENATOR ADULT (OUTSIDE OR) N/A 2/27/2018    Procedure: INSERT EXTRACORPORAL MEMBRANE OXYGENATOR ADULT (OUTSIDE OR);  INSERT EXTRACORPORAL MEMBRANE OXYGENATOR ADULT (OUTSIDE OR) ;  Surgeon: Toby Hernandez  MD Jerry;  [facility]    IR CVC TUNNEL PLACEMENT > 5 YRS OF AGE  10/25/2019    IR DIALYSIS FISTULOGRAM LEFT  3/2/2021    IR DIALYSIS MECH THROMB, PTA  3/2/2021    IR FLUORO 0-1 HOUR  5/7/2021    IR GASTRO JEJUNOSTOMY TUBE PLACEMENT  2/16/2021    IR PARACENTESIS  1/8/2020    IR THORACENTESIS  9/13/2019    IR TRANSCATHETER BIOPSY  1/8/2020    LASER CO2 BRONCHOSCOPY N/A 4/30/2021    Procedure: Flexible and Rigid Bronchoscopy and Tissue Debulking with CO2 Laser Assistance;  Surgeon: Mati Norris MD;  [facility]    LASER CO2 BRONCHOSCOPY N/A 6/11/2021    Procedure: BRONCHOSCOPY, Flexible and Rigid Bronchoscopy, Tissue Debulking with cryo Assistance, airway dilation,;  Surgeon: Mati Norris MD;  [facility]    LASER CO2 BRONCHOSCOPY N/A 9/16/2021    Procedure: BRONCHOSCOPY, flexible and rigid, CO2 Laser Debulking;  Surgeon: Mati Norris MD;  [facility]    LASER CO2 BRONCHOSCOPY N/A 2/11/2022    Procedure: flexible, rigid bronchoscopy, tissue debulking, airway dilation, co2 laser, bronchoalveolar lavage;  Surgeon: Juana Baugh MD;  [facility]    no prior surgery      REMOVE EXTRACORPORAL MEMBRANE OXYGENATOR ADULT N/A 3/3/2018    Procedure: REMOVE EXTRACORPORAL MEMBRANE OXYGENATOR ADULT;  Removal of Right Femoral Venous and Right Axillary Arterial Extracorporeal Membrane Oxygenator;  Surgeon: Toby Hernandez MD;  [facility]    TRANSPLANT LUNG RECIPIENT SINGLE X2 Bilateral 3/1/2018    Procedure: TRANSPLANT LUNG RECIPIENT SINGLE X2;  Median Sternotomy, Extracorporeal Membrane Oxygenator, bilateral sequential lung transplant;  Surgeon: Toby Hernandez MD;  [facility]        MEDICATIONS:  PTA Meds  Prior to Admission medications    Medication Sig Last Dose Taking? Auth Provider Long Term End Date   acetaminophen (TYLENOL) 325 MG tablet Take 1 tablet (325 mg) by mouth every 4 hours as needed for mild pain or fever   Leelee Huang MD     acetylcysteine  (MUCOMYST) 10 % nebulizer solution Inhale 4 mLs into the lungs 2 times daily as needed for mucolysis/respiratory distress   Ame Chow PA-C     albuterol (PROVENTIL) (2.5 MG/3ML) 0.083% neb solution Take 1 vial (2.5 mg) by nebulization every 6 hours as needed for shortness of breath, wheezing or cough   Ame Chow PA-C Yes    azaTHIOprine (IMURAN) 50 MG tablet Take 1.5 tablets (75 mg) by mouth daily   Ame Chow PA-C Yes    azithromycin (ZITHROMAX) 250 MG tablet Take 1 tablet (250 mg) by mouth daily   Ame Chow PA-C     budesonide (PULMICORT) 0.5 MG/2ML neb solution Take 2 mLs (0.5 mg) by nebulization daily as needed   Ame Chow PA-C Yes    calcium acetate (PHOSLO) 667 MG CAPS capsule Take 1 capsule (667 mg) by mouth 3 times daily (with meals) (largest meal)   Ame Chow PA-C     cholecalciferol 50 MCG (2000 UT) CAPS cholecalciferol (vitamin D3) 50 mcg (2,000 unit)   Reported, Patient     fluticasone-salmeterol (ADVAIR) 250-50 MCG/ACT inhaler Inhale 1 puff into the lungs every 12 hours   Ame Chow PA-C Yes    lidocaine-prilocaine (EMLA) 2.5-2.5 % external cream lidocaine-prilocaine 2.5-2.5%   Reported, Patient No    Magnesium Cl-Calcium Carbonate (SLOW-MAG) 71.5-119 MG TBEC Take 2 tablets by mouth four times a week Take on non dialysis days T/Th/Sa/Su   Ame Chow PA-C No    metoprolol tartrate (LOPRESSOR) 25 MG tablet 1 tablet (25 mg) by Oral or Feeding Tube route 2 times daily   Ame Chow PA-C Yes    montelukast (SINGULAIR) 10 MG tablet Take 1 tablet (10 mg) by mouth At Bedtime   Ame Chow PA-C Yes    multivitamin RENAL (RENAVITE RX/NEPHROVITE) 1 tablet tablet Take 1 tablet by mouth daily   Ame Chow PA-C     pantoprazole (PROTONIX) 40 MG EC tablet Take 1 tablet (40 mg) by mouth every morning (before breakfast)   Yenni Graham MD     posaconazole (NOXAFIL) 100 MG EC tablet Take 3  tablets (300 mg) by mouth daily   Ame Chow PA-C     predniSONE (DELTASONE) 5 MG tablet Take 1 tablet (5 mg) by mouth every morning   Ame Chow PA-C     sulfamethoxazole-trimethoprim (BACTRIM) 400-80 MG tablet Take 1 tablet by mouth Every Mon, Wed, Fri Morning   Ame Chow PA-C No    tacrolimus (GENERIC) 0.5 MG capsule Take 1 capsule (0.5 mg) by mouth every morning Total dose: 0.5 mg in AM and 1 mg in PM   Ame Chow PA-C No    tacrolimus (GENERIC) 1 MG capsule Take 1 capsule (1 mg) by mouth every evening Total dose: 0.5 mg in AM and 1 mg in PM   Ame Chow PA-C No    valGANciclovir (VALCYTE) 450 MG tablet Take 1 tablet (450 mg) by mouth three times a week Take on dialysis days AFTER dialysis   Ame Chow PA-C Yes       Current Meds   azaTHIOprine  75 mg Oral Daily    azithromycin  250 mg Oral Daily    calcium acetate  667 mg Oral TID w/meals    fluticasone-vilanterol  1 puff Inhalation Daily    heparin ANTICOAGULANT  5,000 Units Subcutaneous Q12H    [Held by provider] metoprolol tartrate  25 mg Oral or Feeding Tube BID    minocycline  200 mg Intravenous Q12H    montelukast  10 mg Oral At Bedtime    multivitamin RENAL  1 tablet Oral Daily    pantoprazole  40 mg Oral QAM AC    piperacillin-tazobactam  2.25 g Intravenous Q6H    posaconazole  300 mg Oral Daily    predniSONE  5 mg Oral QAM    [START ON 10/27/2023] sulfamethoxazole-trimethoprim  1 tablet Oral Once per day on Monday Wednesday Friday    tacrolimus  0.5 mg Oral QAM    tacrolimus  1 mg Oral QPM    [START ON 10/27/2023] valGANciclovir  450 mg Oral Once per day on Monday Wednesday Friday    vancomycin place jordan - receiving intermittent dosing  1 each Intravenous See Admin Instructions    Vitamin D3  50 mcg Oral Daily     Infusion Meds      ALLERGIES:    No Known Allergies    REVIEW OF SYSTEMS:  A comprehensive of systems was negative except as noted above.    SOCIAL HISTORY:   Social  History     Socioeconomic History    Marital status:      Spouse name: Not on file    Number of children: Not on file    Years of education: Not on file    Highest education level: Not on file   Occupational History    Not on file   Tobacco Use    Smoking status: Never    Smokeless tobacco: Never   Substance and Sexual Activity    Alcohol use: No     Alcohol/week: 1.0 standard drink of alcohol     Types: 1 Glasses of wine per week    Drug use: No    Sexual activity: Not on file   Other Topics Concern    Parent/sibling w/ CABG, MI or angioplasty before 65F 55M? No   Social History Narrative    3/6/2019 - Lives with . Has three daughters. Four grandchildren (two ). No pets. Travelled previously to [facility]. Has visited [place] several times.      Social Determinants of Health     Financial Resource Strain: Not on file   Food Insecurity: Not on file   Transportation Needs: Not on file   Physical Activity: Not on file   Stress: Not on file   Social Connections: Not on file   Interpersonal Safety: Not on file   Housing Stability: Not on file     Reviewed with patient      FAMILY MEDICAL HISTORY:   Family History   Problem Relation Age of Onset    Hypertension Mother     Arthritis Mother     Pancreatic Cancer Father     Diabetes Father      No known Family history of kidney disease       PHYSICAL EXAM:   Temp  Av.2  F (37.3  C)  Min: 97.9  F (36.6  C)  Max: 100  F (37.8  C)      Pulse  Av.7  Min: 94  Max: 127 Resp  Av.7  Min: 17  Max: 47  SpO2  Av.9 %  Min: 66 %  Max: 100 %       BP 98/59   Pulse 114   Temp 99.5  F (37.5  C) (Oral)   Resp 20   LMP 2014 (Exact Date)   SpO2 99%       Admit       GENERAL APPEARANCE: alert  EYES: no scleral icterus, pupils equal  Pulmonary: course  CV: afib   - Edema 1+  GI: soft, non-tender   MS: no evidence of inflammation in joints, no muscle tenderness  : no rivas  SKIN: erythema covering body  NEURO: face symmetric,  a/o3  Access: L AVF    LABS:   I have reviewed the following labs:  CMP  Recent Labs   Lab 10/26/23  0606 10/26/23  0054 10/25/23  1356 10/24/23  1033   * 132* 133* 138   POTASSIUM 4.5 4.1 4.4 3.7   CHLORIDE 94* 94* 96* 96*   CO2 24 25 26 31*   ANIONGAP 15 13 11 11   GLC 75 81 92 120*   BUN 31.7* 29.5* 24.6* 14.6   CR 5.61* 5.32* 4.90* 3.43*   GFRESTIMATED 8* 9* 10* 15*   CHELSEY 8.4* 8.0* 8.3* 9.0   MAG  --   --   --  1.9   PHOS 3.9  --   --   --    PROTTOTAL 6.3* 5.5* 6.0* 7.4   ALBUMIN 3.2* 2.9* 3.0* 3.6   BILITOTAL 1.5* 1.5* 1.0 0.9   ALKPHOS 186* 168* 232* 306*   AST 26 24 34 39   ALT 24 22 25 31     CBC  Recent Labs   Lab 10/26/23  0606 10/26/23  0054 10/25/23  1356 10/24/23  1033   HGB 10.5* 8.6* 10.1* 10.9*   WBC 8.5 9.9 11.6* 3.9*   RBC 3.19* 2.66* 3.07* 3.30*   HCT 33.3* 27.1* 30.8* 33.4*   * 102* 100 101*   MCH 32.9 32.3 32.9 33.0   MCHC 31.5 31.7 32.8 32.6   RDW 17.3* 17.1* 16.7* 16.0*   * 131* 142* 165     INR  Recent Labs   Lab 10/24/23  1033   INR 0.99     ABGNo lab results found in last 7 days.   URINE STUDIES  Recent Labs   Lab Test 01/24/21  1729 10/21/19  2240 09/12/19  0125 08/07/19  1512   COLOR Yellow Yellow Light Yellow Yellow   APPEARANCE Slightly Cloudy Cloudy Clear Clear   URINEGLC Negative Negative Negative Negative   URINEBILI Negative Negative Negative Negative   URINEKETONE 5* Negative 10* Negative   SG 1.023 1.018 1.008 1.016   UBLD Small* Trace* Negative Negative   URINEPH 5.0 5.0 7.5* 7.0   PROTEIN 30* 30* 30* 100*   NITRITE Negative Negative Negative Negative   LEUKEST Moderate* Large* Negative Trace*   RBCU 26* 25* <1 10*   WBCU 34* 115* 3 19*     Recent Labs   Lab Test 08/07/19  1512   [facility] 2.47*     PTH  No lab results found.  IRON STUDIES  Recent Labs   Lab Test 02/03/21  0415 12/13/18  1033 08/01/18  0921 05/08/18  0709   IRON 51 16* 93 48   * 221* 248 275   IRONSAT 36 7* 37 18   YOLA  --  302* 571*  --        IMAGING:  Reviewed    MERRICK Coronado

## 2023-10-26 NOTE — PROGRESS NOTES
[facility]    Medicine Progress Note - Medicine Service, JOSE TEAM 3    Date of Admission:  10/25/2023    Assessment & Plan      Kecia Blue is a 62 year old female admitted on 10/25/2023. 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    Today:  - labs per transplant pulm recs  - stopped vancomycin and minocycline   - decrease azathioprine to 50mg qday  - scheduled nebs for breathing    #Hypotension  Rapid response was called overnight 10/26 d/t hypotension, tachycardia, and tachypnea. Doppler venous US of bilateral lower extremities with no DVT. Pt had been taking PTA metoprolol given concerns of HTN in range of 150-160s during dialysis treatments. Lactate 1.1. Temperature remains < 100.4* F. Metoprolol held d/t hypotension. Midodrine ordered for PRN use. MAP has remained >65 since overnight, mentation fully intact when examining today. Main concern on differential is sepsis 2/2 suspected pulmonary infection, drug reaction (to vancomycin and/or minocycline) or possible adrenal insufficiency. May consider stress dose steroids in case of drug reaction or possible adrenal insufficiency if becomes hypotensive again.  - c/w midodrine 10 mg daily PRN for SBP < 100.  - Management of respiratory function and suspected pulmonary infection as below.    #Cough   #Leukocytosis in immunosuppressed pt  #ILD 2/2 anti-synthetase syndrome s/p B/L lung transplant 3/2018  #Actinomyces (+) BAL  #Hx  Stenotrophomonas Pneumonia  #Chronic hypoxic respiratory failure requiring 2L home NC  Pt with 2month hx of cough. Hx BL lung transplant (2018) c/b right mainstem bronchial stenosis requiring serial treatments with balloon bronchoplasty procedures, most recently on 10/17/23. Cough improved for 24 hours, but the with increased dyspnea, increased need for oxygen for comfort, and cough. Pt saw transplant pulm day pta in clinic who started augmentin with planned ID follow-up on 10/30, however, cough continued to progress. On presentation to ED, but with mild tachycardia, tachypnea, and leukocytosis. CT Chest from 10/24 notable for worsening consolidation BL c/f worsening infection, along with new prominent KONRAD soft tissue thickening. BAL on 10/17 notable actinomyces. Pt also has hx of stenotrophomonas from August of 2023. Given the above, highest suspicion for pulmonary source of infection. Procalcitonin 10/25/23 elevated at 0.7, suggestive of moderate systemic infection. Completing infectious work-up with UA, Bcx x2. MRSA/MSSA PCR 10/25/23 negative.  Blood cultures collected 10/25/23, pending results.   CXR obtained 10/25/23 with findings including: Low lung volumes with scattered basilar predominant linear interstitial opacities, particularly at the right lung base. Bilateral perihilar airspace opacification, small bilateral pleural effusions, no pneumothorax, normal cardiac silhouette is normal. Findings of low lung volumes noted to be reflective of moderate pulm edema, unable to r/o superimposed infectious process.  -Supplemental O2 PRN to maintain o2 saturations >92%  -Pulmonary transplant team consulted   -decrease azathioprine 75mg qday to 50mg qday.   -c/w azithromycin 250mg qday   -c/w tacrolimus. Check level tomorrow AM.    -c/w prednisone 5mg qday   -c/w Bactrim for PJP ppx  -c/w singulair + advair   -Transplant ID consulted for assistance with work-up/abx management  -Urine histoplasma antigen  quant  -Cryptococcus serum antigen  -Respiratory pathogen panel  -Sputum culture  -AFB culture   -Nocardia culture  -Fungitell   -Scheduled nebs for breathing  -Abx coverage:   -discontinue vancomycin (10/25-10/26). Per ID, maintain low threshold to restart if Pt decompensates and c/f sepsis   -discontinue minocycline (10/25-10/26). Per ID, maintain low threshold to restart if Pt decompensates and c/f sepsis   -c/w zosyn (10/25-p)    #Diffuse erythema  #Diffuse pruritus, currently resolved  Pt noted to have diffuse blanchable erythema during initial exam in ED 10/25/23, was very pruritic. Per pt and  this was new 10/25/23 afternoon. No dyspnea, breathing concerns. Pt receiving zosyn infusion at time of interview. C/f allergic reaction- discussed with pharmacy and patient has received all antitiotics (zosyn, vancomycin, minocycline, and augmentin) previously and w/o complication. C/f possible confounding vancomycin infusion reaction/flushing syndrome, however erythema appeared prior to first vanco infusion given 10/25/23 at 1700. Benadryl given yesterday 10/25/23 which alleviated pruritus. On exam today, Pt continues to have diffuse blanchable erythema throughout torso and extremities, no pruritus since receiving benadryl. Etiology remains unclear at this time.   -Discontinue vancomycin and minocycline out of abundance of caution for vancomycin infusion reaction/ minocycline hypersensitivity reaction  -Atarax 25mg q6h PRN for itching  -Will closely monitor for progression of rash    #Hx Left Sided Aspergillus Empyema:  First noted in 2019. Needle aspiration confirmatory of aspergillus fumigatus, now s/p intrapleural amphotericin bead placement. Discussions had regarding surgery, however, surgery felt to be associated with too high of morbidity, and indefinite posaconazole decided as plan.  -c/w PTA posaconazole. Check level today  -Transplant pulmonology consulted, appreciate recs when available    #Left  Breast Rash:  Pt with diffuse erythema including left breast. Left breast with orange peel skin, increased skin thickness. Per patient this lesion has been present for months, even years and has not changed in appearance. Does not cause her discomfort. No nipple discharge. Per pt appeared after fistula placement. 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 Highest suspicion for lymphatic/venous changes post-fistulogram as cause of these changes, though differential also includes mastitis/cellulitis (though reassuring that area is not warm or w/ inc. Erythema compared to rest of body and is chronic and unchanged), malignancy (last mammogram in 02/2023 and lesion was present and is unchanged since then), or dermatomyositis associated rash.  -Consider dermatology consult for further assessment, consider bilateral breast ultrasound.    #ESRD on HD (M, W, F)  #Macrocytic anemia  #Hx Hyperphosphatemia  Hx of ESRD on HD. Missed dialysis 10/25/23  as she was in the emergency room. Does not appear to be grossly volume overloaded; labs without any emergent abnormalities. Hb 10.5 today, which is at pt's baseline. Suspect anemia of renal disease. Phosphorous level 10/26/23 result wnl at 3.9. Pt to receive dialysis today per dialysis team.  -c/w HD (M, W, F)  -ESRD service consulted:   -c/w pta vitamins   -Epo/iron per nephrology   -c/w phoslo   -renal panel daily    #h/o EBV viremia  #h/o CMV viremia  -c/w valgancyclovir  -CMV + EMV levels per transplant pulm    #h/o C diff colitis  Pt appears to have remote hx of c. Diff colitis. Currently without any diarrhea.     Diet: Renal Diet  DVT Prophylaxis: Heparin SQ  Lines: None     Cardiac Monitoring: None  Code Status:  Full code          Diet: Renal Diet (dialysis)    DVT Prophylaxis: Heparin SQ  Lines: None     Cardiac Monitoring: None  Code Status: Full Code      Disposition Plan      Expected  "Discharge Date: 10/30/2023                  The patient's care was discussed with the Attending Physician, Dr. Emanuel and Patient.    Resident/Fellow Attestation   I, Sheeba Nesbitt MD, was present with the medical/MARIA A student who participated in the service and in the documentation of the note.  I have verified the history and personally performed the physical exam and medical decision making.  I agree with the assessment and plan of care as documented in the note.      Sheeba Nesbitt MD  PGY1  Date of Service (when I saw the patient): 10/26/23     George Swann Miranda  Medical Student  Medicine Service, [facility] TEAM 3  [facility]  Securely message with [employer] (more info)  Text page via [facility] Paging/Directory   See signed in provider for up to date coverage information    ______________________________________________________________________    Interval History   Overnight, rapid response called d/t hypotension in the 70s/40s, tachycardia and tachypnea. Pt reports remaining asymptomatic throughout rapid response, did not notice feeling any different when she became hypotensive. Hypotension responded to albumin and IVC, MAP has since remained >65.     Pt reports feeling \"better\" today. Reports sleeping well through the night. Appetite has improved and is eating meals.     Denies dizziness, HA, chills, abdominal pain.     Continues producing some urine on own, urinated yesterday. Did not receive HD yesterday.    Physical Exam   Vital Signs: Temp: 99.5  F (37.5  C) Temp src: Oral BP: 98/59 Pulse: 114   Resp: 20 SpO2: 99 % O2 Device: Nasal cannula Oxygen Delivery: 2 LPM      General Appearance:  Laying supine in bed, appears in moderate distress d/t frequent cough. Awake, alert, oriented to person, place, time.  Respiratory: Coarse breath sounds bilaterally, mildly labored breathing on 2L NC w/ frequent cough.  Cardiovascular: RRR, normal S1 and S2, no additional heart " sounds, murmurs, gallops.   GI: Soft, nontender, nondistended. BS+  Skin: Skin appears diffusely erythematous across torso, extremities.     Data   NOTE: Data reviewed over the past 24 hrs contributes toward MDM complexity

## 2023-10-26 NOTE — PROGRESS NOTES
BP (!) 72/49   Pulse 103   Temp 100  F (37.8  C) (Oral)   Resp 29   LMP 06/07/2014 (Exact Date)   SpO2 (!) 66%     Neuro: A&Ox4.   Cardiac: Afebrile, VSS.   Respiratory:2L NC  GI/: Voiding spontaneously. No BM this shift.   Diet/appetite: Tolerating diet. Denies nausea   Activity: Up SBA   Pain: Denies   Skin: No new deficits noted.  Lines: PIV  Drains:None      No new complaints. Will continue to monitor and follow plan of care.

## 2023-10-26 NOTE — CONSULTS
[facility]  Transplant Infectious Disease Consult Note - New Patient     Patient:  Kecia Blue, Date of birth 1960, Medical record number [id number]  Date of Visit:  10/26/2023  Consult requested by Dr. Kuldip Emanuel for evaluation of          Assessment and Recommendations:   Recommendations:  1. Continue pip-tazo. Stop vancomycin and minocycline. Anticipate ~2 week course, probably can de-escalate to amox-clav once she improves.  2. Send serum Crypto Ag and urine Histo Ag. Follow BAL cultures.   3. Continue posaconazole, TMP-SMX, and azithromycin for prophylaxis. Continue VGCV.   4. Her diffusely erythrodermic pruritic reaction after receiving vancomycin + pip/tazo is consistent with vancomycin-infusion syndrome. Allergy list updated. This is a pseudoallergy and not a contraindication. She can receive vancomycin but would premedicate with an antihistamine and slow infusion time in the future.   5. She would benefit from post-dilation courses of amox-clav for ~2 weeks in the future (or alternative, 1 week out of each month).     Thank you very much for this consultation. Discussed with Dr. Meier, ID staff. Transplant Infectious Disease will continue to follow with you.    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 Would not consider this pulmonary actinomyces at this time, but rather use that as a surrogate of significant oral contamination that has caused her slowly progressive symptoms. Zosyn is an excellent drug for this syndrome and she already has had signficant improvement since starting Zosyn on 10/25. MRSA nare negative - would stop vancomycin and minocycline as well (steno has not grown again, and no symptom improvement with extended tx of this organism).     Viral cause seems unlikely to be going on for 2 months. CMV viremia is responding well to treatment plus CMV BAL is negative. Fungal cause is possible - especially aspergillus given her history of it, though BAL cultures and galactomannan have not suggested this organism. CMV viremia is a major risk factor for IFI.  PJP possible, but PCR negative. Nocardia cultures negative multiple times. AFB negative. Can round out fungal work-up with CrAg and histoplasma urinary Ag. Will follow serum galactomannan, though her BAL galactomannan is negative. If positive, may suggest extra-pulmonary source (eg. Pleura/effusion specifically of note the left upper pleural thickening seen on most recent CT chest). She can continue her prior posaconazole while work-up proceeds.     #CMV viremia  She is on VGCV with good treatment response.      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 BAL showed septate hyphae. 7/2020 had hypodense mass in left lung with biopsy showing fungal hyphae, cx aspergillus. Started on voriconazole in 2019, changed to posaconazole in 2020, she remains on 300 mg daily.  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 Recovery  from this required tracheostomy.  2019 - She had also grown Actinomyces from multiple BAL    - QTc interval: 420 msec (10/25/2023)   - Bacterial prophylaxis: Azithromycin  - Pneumocystis prophylaxis: TMP-SMX for life (hx PJP)  - Serostatus & viral prophylaxis: CMV D+/R+, EBV D+/R+  - Fungal prophylaxis: posaconazole   - Immunization status: Seasonal influenza, RSV, and COVID vaccine  - Gamma globulin status: Not recently checked  - Isolation status: Hx MRSA - contact         History of Infectious Disease Illness:   59F with history of ILD, seronative RA, dermatomyositis s/p bilateral lung transplant 3/2018 on tac + prednisone + azathioprine with post transplant course c/b left aspergilus empyema (2019, on posaconazole), CMV viremia, ESRD on HD, and 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 She was started on Amox/clav 10/24, but on presentation was broadened to vancomycin + Zosyn + micocycline. She subsequently developed pruritic erythematous rash around the time of Zosyn infusion (though had tolerated this in the past). She had two rapid responses called overnight for tachycardia and hypotension tx with IV fluids without much improvement.     Last seen by xplant ID 8/[id number] - doing well, continued on posaconazole and TMP-SMX, discussed risks of kidney transplant and ultimately decided there was risk of aspergillus returning but no contraindication.    On my interview, patient reports approximately 2 months of slowly progressive cough. She associated malaise, fatigue, and poor appetite with this. No headache, fevers, chills, sweats, changes in vision, sore throat. No nausea or diarrhea. She notes she had a dental cleaning 3  months ago but has not noticed any oral lesions, drainage, or pain. She had not really felt short of breath, but was told to try to wear oxygen by a PFT technician to see if it helped her cough and energy level (which it did a bit).     It appears she has bronchoscopy for dilation every 3 months or so - last 8/2023 prior to 10/17/2023 bronchoscopy. She grew stenotrophomonas on 8/17 bronchoscopy which was treated with a course of TMP-SMX and potentially minocycline, though patient notes not much improvement with this regimen. She does feel notably better since being admitted to the hospital - less cough, more energy.     Exposure:  [place] [facility]. No farm animals or pets. Lives with her . They grow corn and soybean. She does not spend much time outside recently. No recent travel in the past few months, even prior to her illness.      Transplants:  3/1/2018 (Lung), Postoperative day:  2065.  Coordinator Kacy Martínez    Past Medical History:   Diagnosis Date     Acute on chronic respiratory failure with hypoxia (H) 02/21/2018     Anisocoria      Antisynthetase syndrome (H24) 2014     Anxiety      Aspergillus (H)      Aspergillus pneumonia (H) 11/20/2020     Benign essential hypertension      C. difficile colitis      Cytomegalovirus (CMV) viremia (H)      Dermatomyositis (H)      Dysplasia of cervix, low grade (ESTRADA 1)      EBV (Franklin-Barr virus) viremia      ESRD (end stage renal disease) on dialysis (H)      ILD (interstitial lung disease) (H)      Lung replaced by transplant (H)      Osteopenia      Paroxysmal atrial fibrillation (H)      Pneumocystis jiroveci pneumonia (H)      PONV (postoperative nausea and vomiting)      Post-menopause      Pulmonary hypertension (H)      Raynaud's disease      Seronegative rheumatoid arthritis (H)        Past Surgical History:   Procedure Laterality Date     BRONCHOSCOPY (RIGID OR FLEXIBLE), DIAGNOSTIC N/A 4/10/2018    Procedure:  COMBINED BRONCHOSCOPY (RIGID OR FLEXIBLE), LAVAGE;;  Surgeon: Mariposa Donohue MD;  [facility]: [facility]     BRONCHOSCOPY (RIGID OR FLEXIBLE), DIAGNOSTIC N/A 12/23/2020    Procedure: BRONCHOSCOPY, WITH BRONCHOALVEOLAR LAVAGE;  Surgeon: Uri Bass MD;  [facility]     BRONCHOSCOPY (RIGID OR FLEXIBLE), DIAGNOSTIC N/A 5/26/2022    Procedure: BRONCHOSCOPY, WITH BRONCHOALVEOLAR LAVAGE;  Surgeon: Uri Bass MD;  [facility]: [facility]     BRONCHOSCOPY (RIGID OR FLEXIBLE), DIAGNOSTIC N/A 8/17/2023    Procedure: BRONCHOSCOPY, WITH BRONCHOALVEOLAR LAVAGE;  Surgeon: Esau Bruno MD;  [facility]: [facility]     BRONCHOSCOPY (RIGID OR FLEXIBLE), DILATE BRONCHUS / TRACHEA N/A 10/11/2018    Procedure: BRONCHOSCOPY (RIGID OR FLEXIBLE), DILATE BRONCHUS / TRACHEA;  Flexible And Rigid Bronchoscopy and Dilation;  Surgeon: Wilber Lin MD;  [facility]     BRONCHOSCOPY FLEXIBLE N/A 3/13/2018    Procedure: BRONCHOSCOPY FLEXIBLE;  Flexible Bronchoscopy ;  Surgeon: Gissell Sanchez MD;  [facility]     BRONCHOSCOPY FLEXIBLE N/A 5/9/2018    Procedure: BRONCHOSCOPY FLEXIBLE;;  Surgeon: Wilber Lin MD;  [facility]     BRONCHOSCOPY FLEXIBLE AND RIGID N/A 9/10/2018    Procedure: BRONCHOSCOPY FLEXIBLE AND RIGID;  Flexible and Rigid Bronchoscopy with Balloon Dilation, tissue debulking with cryo, and Right mainstem bronchus stent placement;  Surgeon: Wilber Lin MD;  [facility]     BRONCHOSCOPY RIGID N/A 6/6/2018    Procedure: BRONCHOSCOPY RIGID;;  Surgeon: Lopez Macias MD;  [facility]     BRONCHOSCOPY, DILATE BRONCHUS, STENT BRONCHUS, COMBINED N/A 6/11/2018    Procedure: COMBINED BRONCHOSCOPY, DILATE BRONCHUS, STENT BRONCHUS;  Flexible Bronchoscopy, Balloon Dilation, Bronchial Washings;  Surgeon: Wilber Lin MD;  [facility]     BRONCHOSCOPY, DILATE BRONCHUS, STENT BRONCHUS, COMBINED Right 7/10/2018    Procedure: COMBINED BRONCHOSCOPY, DILATE  BRONCHUS, STENT BRONCHUS;  Flexible Bronchoscopy, Balloon Dilation, Bronchial Washings  ;  Surgeon: Wilber Lin MD;  [facility]     BRONCHOSCOPY, DILATE BRONCHUS, STENT BRONCHUS, COMBINED N/A 8/2/2018    Procedure: COMBINED BRONCHOSCOPY, DILATE BRONCHUS, STENT BRONCHUS;  Flexible Bronchoscopy, Bronchial Washings, Balloon Dilation;  Surgeon: Wilber Lin MD;  [facility]     BRONCHOSCOPY, DILATE BRONCHUS, STENT BRONCHUS, COMBINED N/A 8/20/2018    Procedure: COMBINED BRONCHOSCOPY, DILATE BRONCHUS, STENT BRONCHUS;  Flexible Bronchoscopy, Balloon Dilation;  Surgeon: Wilber Lin MD;  [facility]     BRONCHOSCOPY, DILATE BRONCHUS, STENT BRONCHUS, COMBINED N/A 10/29/2018    Procedure: Flexible Bronchoscopy, Balloon Dilation, Stent Revision, Airway Examination And Therapeutic Suctioning, Cyro Tumor Debulking;  Surgeon: Wilber Lin MD;  [facility]     BRONCHOSCOPY, DILATE BRONCHUS, STENT BRONCHUS, COMBINED N/A 11/20/2018    Procedure: Rigid Bronchoscopy, Stent Removal and dilitation;  Surgeon: Wilber Lin MD;  [facility]     BRONCHOSCOPY, DILATE BRONCHUS, STENT BRONCHUS, COMBINED N/A 12/14/2018    Procedure: Flexible And Rigid Bronchoscopy, Balloon Dilation, Bronchial Washing;  Surgeon: Wilber Lin MD;  [facility]     BRONCHOSCOPY, DILATE BRONCHUS, STENT BRONCHUS, COMBINED N/A 1/17/2019    Procedure: Flexible And Rigid Bronchoscopy, Balloon Dilation.;  Surgeon: Wilber Lin MD;  [facility]     BRONCHOSCOPY, DILATE BRONCHUS, STENT BRONCHUS, COMBINED N/A 3/7/2019    Procedure: Flexible and Rigid Bronchoscopy, Bronchial Washing, Balloon Dilation;  Surgeon: Wilber Lin MD;  [facility]     BRONCHOSCOPY, DILATE BRONCHUS, STENT BRONCHUS, COMBINED N/A 6/6/2019    Procedure: Rigid and Flexible Bronchoscopy, Balloon Dilation;  Surgeon: Wilber Lin MD;  [facility]     BRONCHOSCOPY, DILATE BRONCHUS,  STENT BRONCHUS, COMBINED N/A 10/11/2019    Procedure: Flexible and Rigid Bronchoscopy, Balloon Dilation, Bronchoalveolar Lagave;  Surgeon: Wilber Lin MD;  [facility]     BRONCHOSCOPY, DILATE BRONCHUS, STENT BRONCHUS, COMBINED N/A 2/19/2021    Procedure: BRONCHOSCOPY, flexible, airway dilation, and bronchial wash;  Surgeon: Wilber Lin MD;  [facility]     BRONCHOSCOPY, DILATE BRONCHUS, STENT BRONCHUS, COMBINED N/A 4/9/2021    Procedure: BRONCHOSCOPY, flexible and rigid, Airway suctioning;  Surgeon: Mati Norris MD;  [facility]     BRONCHOSCOPY, DILATE BRONCHUS, STENT BRONCHUS, COMBINED N/A 10/17/2023    Procedure: RIGID, flexible bronchoscopy with airway dilation;  Surgeon: Preet Patel MD;  [facility]     CV RIGHT HEART CATH MEASUREMENTS RECORDED N/A 3/10/2020    Procedure: CV RIGHT HEART CATH;  Surgeon: Wai Garcia MD;  [facility]     ENT SURGERY      tonsillectomy as a child     ESOPHAGOSCOPY, GASTROSCOPY, DUODENOSCOPY (EGD), COMBINED N/A 10/29/2018    Procedure: COMBINED ESOPHAGOSCOPY, GASTROSCOPY, DUODENOSCOPY (EGD) with biopsies and polypectomy;  Surgeon: Chente Bloom MD;  [facility]     INSERT EXTRACORPORAL MEMBRANE OXYGENATOR ADULT (OUTSIDE OR) N/A 2/27/2018    Procedure: INSERT EXTRACORPORAL MEMBRANE OXYGENATOR ADULT (OUTSIDE OR);  INSERT EXTRACORPORAL MEMBRANE OXYGENATOR ADULT (OUTSIDE OR) ;  Surgeon: Toby Hernandez MD;  [facility]     IR CVC TUNNEL PLACEMENT > 5 YRS OF AGE  10/25/2019     IR DIALYSIS FISTULOGRAM LEFT  3/2/2021     IR DIALYSIS MECH THROMB, PTA  3/2/2021     IR FLUORO 0-1 HOUR  5/7/2021     IR GASTRO JEJUNOSTOMY TUBE PLACEMENT  2/16/2021     IR PARACENTESIS  1/8/2020     IR THORACENTESIS  9/13/2019     IR TRANSCATHETER BIOPSY  1/8/2020     LASER CO2 BRONCHOSCOPY N/A 4/30/2021    Procedure: Flexible and Rigid Bronchoscopy and Tissue Debulking with CO2 Laser Assistance;   Surgeon: Mati Norris MD;  [facility]     LASER CO2 BRONCHOSCOPY N/A 2021    Procedure: BRONCHOSCOPY, Flexible and Rigid Bronchoscopy, Tissue Debulking with cryo Assistance, airway dilation,;  Surgeon: Mati Norris MD;  [facility]     LASER CO2 BRONCHOSCOPY N/A 2021    Procedure: BRONCHOSCOPY, flexible and rigid, CO2 Laser Debulking;  Surgeon: Mati Norris MD;  [facility]     LASER CO2 BRONCHOSCOPY N/A 2022    Procedure: flexible, rigid bronchoscopy, tissue debulking, airway dilation, co2 laser, bronchoalveolar lavage;  Surgeon: Juana Baugh MD;  [facility]     no prior surgery       REMOVE EXTRACORPORAL MEMBRANE OXYGENATOR ADULT N/A 3/3/2018    Procedure: REMOVE EXTRACORPORAL MEMBRANE OXYGENATOR ADULT;  Removal of Right Femoral Venous and Right Axillary Arterial Extracorporeal Membrane Oxygenator;  Surgeon: Toby Hernandez MD;  [facility]     TRANSPLANT LUNG RECIPIENT SINGLE X2 Bilateral 3/1/2018    Procedure: TRANSPLANT LUNG RECIPIENT SINGLE X2;  Median Sternotomy, Extracorporeal Membrane Oxygenator, bilateral sequential lung transplant;  Surgeon: Toby Hernandez MD;  [facility]       Family History   Problem Relation Age of Onset     Hypertension Mother      Arthritis Mother      Pancreatic Cancer Father      Diabetes Father        Social History     Social History Narrative    3/6/2019 - Lives with . Has three daughters. Four grandchildren (two ). No pets. Travelled previously to [facility]. Has visited [place] several times.      Social History     Tobacco Use     Smoking status: Never     Smokeless tobacco: Never   Substance Use Topics     Alcohol use: No     Alcohol/week: 1.0 standard drink of alcohol     Types: 1 Glasses of wine per week     Drug use: No       Immunization History   Administered Date(s) Administered     COVID-19 Bivalent 12+ (Pfizer) 2022     COVID-19 MONOVALENT 12+ (Pfizer)  03/10/2021, 03/30/2021, 10/12/2021     FLU 6-35 months 02/24/2016     Hep B, Adult (Heplisav- B) 06/04/2020, 07/04/2020, 08/05/2020, 12/07/2020, 06/15/2022, 07/20/2022, 08/17/2022, 01/18/2023, 07/19/2023, 08/16/2023     HepB, Unspecified 06/04/2020, 07/04/2020, 08/05/2020, 12/07/2020     Hepatitis B, Adult 06/04/2020, 07/04/2020, 08/05/2020, 12/07/2020     Influenza Vaccine 18-64 (Flublok) 10/09/2019     Influenza Vaccine 65+ (Fluzone HD) 02/24/2016     Influenza Vaccine >6 months (Alfuria,Fluzone) 10/22/2014, 10/26/2015, 02/24/2016, 11/29/2016, 12/01/2017, 10/29/2018, 10/12/2020, 10/03/2022     Influenza Vaccine IM Ages 6-35 Months 4 Valent (PF) 02/24/2016     Mantoux Tuberculin Skin Test 10/30/2019, 11/07/2019     Pneumo Conj 13-V (2010&after) 02/24/2016     Pneumococcal 23 valent 11/20/2014, 12/18/2019     Pneumococcal, Unspecified 02/24/2016     TD,PF 7+ (Tenivac) 03/11/1996     TDAP (Adacel,Boostrix) 02/04/2008     TDAP Vaccine (Boostrix) 12/13/2018     Td (Adult), Adsorbed 03/11/1996     Tdap (Adult) Unspecified Formulation 02/04/2008     Zoster recombinant adjuvanted (SHINGRIX) 03/06/2019, 06/05/2019       Patient Active Problem List   Diagnosis     ILD (interstitial lung disease) (H)     S/P lung transplant (H)     Steroid-induced hyperglycemia     Deep vein thrombosis (DVT) (H) [I82.409]     Encounter for long-term (current) use of high-risk medication     Dehydration     Acute kidney injury (H24)     Cytomegalovirus (CMV) viremia (H)     Nausea and vomiting     Low hemoglobin     Cholecystitis, unspecified     Empyema of lung (H)     Administration of long-term prophylactic antibiotics     RADHA (acute kidney injury) (H24)     Shortness of breath     Pulmonary hypertension (H)     Hemodialysis patient (H24)     Respiratory failure (H)     End stage renal failure on dialysis (H)     Stenosis of right bronchus     Pneumonia due to Pneumocystis jirovecii (H)     Episodic anisocoria     Loculated pleural  effusion     Healthcare-associated pneumonia     Lung transplant status, bilateral (H)     Aspergillus pneumonia (H)     EBV (Franklin-Barr virus) viremia     Pre-diabetes     History of Pneumocystis jirovecii pneumonia     Immunosuppressed status (H24)     Current chronic use of systemic steroids     Age-related osteoporosis without current pathological fracture     ESRD (end stage renal disease) on dialysis (H)     Chronic kidney disease, unspecified     Adverse effect of other drugs, medicaments and biological substances, sequela     Peripheral neuropathy     Other specified disorders of bone density and structure, multiple sites     Other specified disorders involving the immune mechanism, not elsewhere classified (H24)     Abnormal level of blood mineral     Anemia in chronic kidney disease     Antisynthetase syndrome (H24)     BMI 20.0-20.9, adult     CMV (cytomegalovirus) antibody positive     Dermatomyositis (H)     Dermatopolymyositis, unspecified, organ involvement unspecified (H)     Dysplasia of cervix, low grade (ESTRADA 1)     Family history of diabetes mellitus type II     GERD (gastroesophageal reflux disease)     Gottron's papules     Current use of steroid medication     High risk medications (not anticoagulants) long-term use     History of total knee replacement     Iron deficiency anemia secondary to inadequate dietary iron intake     Long term (current) use of antibiotics     Low magnesium level     Osteopenia of multiple sites     Other diseases of bronchus, not elsewhere classified     Other disorders of plasma-protein metabolism, not elsewhere classified     Other specified complication of vascular prosthetic devices, implants and grafts, initial encounter (H24)     Presence of right artificial knee joint     Primary osteoarthritis of left knee     Primary osteoarthritis of right knee     Pulmonary hypertension due to interstitial lung disease (H)     Secondary hyperparathyroidism of renal origin  (H24)     Seronegative rheumatoid arthritis (H)     Steal syndrome of dialysis vascular access (H24)     Cytomegaloviral disease, unspecified (H)     End stage renal disease (H)     Dependence on renal dialysis (H24)     Lung interstitial disease (H)     Other specified abnormal immunological findings in serum     Dyspnea, unspecified type     Edema of left upper extremity            Current Medications & Allergies:       azaTHIOprine  75 mg Oral Daily     azithromycin  250 mg Oral Daily     calcium acetate  667 mg Oral TID w/meals     fluticasone-vilanterol  1 puff Inhalation Daily     heparin ANTICOAGULANT  5,000 Units Subcutaneous Q12H     [Held by provider] metoprolol tartrate  25 mg Oral or Feeding Tube BID     minocycline  200 mg Intravenous Q12H     montelukast  10 mg Oral At Bedtime     multivitamin RENAL  1 tablet Oral Daily     pantoprazole  40 mg Oral QAM AC     piperacillin-tazobactam  2.25 g Intravenous Q6H     posaconazole  300 mg Oral Daily     predniSONE  5 mg Oral QAM     [START ON 10/27/2023] sulfamethoxazole-trimethoprim  1 tablet Oral Once per day on Monday Wednesday Friday     tacrolimus  0.5 mg Oral QAM     tacrolimus  1 mg Oral QPM     [START ON 10/27/2023] valGANciclovir  450 mg Oral Once per day on Monday Wednesday Friday     vancomycin place jordan - receiving intermittent dosing  1 each Intravenous See Admin Instructions     Vitamin D3  50 mcg Oral Daily       Infusions/Drips:      No Known Allergies         Physical Exam:     Patient Vitals for the past 24 hrs:   BP Temp Temp src Pulse Resp SpO2   10/26/23 0700 (!) 81/54 -- -- 103 (!) 33 100 %   10/26/23 0640 (!) 83/56 -- -- 107 -- --   10/26/23 0620 90/54 -- -- 100 (!) 35 100 %   10/26/23 0600 93/56 -- -- 107 (!) 32 99 %   10/26/23 0550 (!) 87/50 -- -- 102 (!) 33 97 %   10/26/23 0536 (!) 84/51 -- -- 104 24 96 %   10/26/23 0524 (!) 84/54 -- -- 101 (!) 31 97 %   10/26/23 0508 (!) 80/46 -- -- 110 -- --   10/26/23 0452 (!) 76/48 -- -- 105 --  --   10/26/23 0434 -- -- -- 108 (!) 34 100 %   10/26/23 0430 (!) 85/46 -- -- 105 -- --   10/26/23 0426 (!) 81/44 -- -- 116 -- --   10/26/23 0400 (!) 83/44 -- -- 100 -- --   10/26/23 0345 94/44 -- -- 102 -- --   10/26/23 0330 (!) 89/40 -- -- 105 -- --   10/26/23 0315 (!) 90/38 -- -- 104 -- --   10/26/23 0256 (!) 89/48 -- -- 110 30 98 %   10/26/23 0252 (!) 82/55 -- -- 103 (!) 32 91 %   10/26/23 0235 (!) 78/49 -- -- 100 -- --   10/26/23 0220 (!) 77/52 -- -- -- -- --   10/26/23 0205 (!) 83/48 -- -- 105 30 98 %   10/26/23 0150 (!) 79/50 -- -- 101 29 99 %   10/26/23 0142 (!) 74/49 -- -- 101 -- --   10/26/23 0124 (!) 76/56 -- -- 94 -- 94 %   10/26/23 0105 (!) 81/50 -- -- 100 (!) 31 95 %   10/26/23 0059 (!) 80/50 -- -- 105 (!) 31 98 %   10/26/23 0046 (!) 77/50 -- -- 100 -- --   10/26/23 0032 (!) 72/47 -- -- 100 (!) 31 95 %   10/26/23 0015 (!) 84/49 -- -- 105 -- --   10/26/23 0014 -- -- -- 107 24 98 %   10/26/23 0000 (!) 74/46 -- -- 106 -- --   10/25/23 2345 (!) 71/48 -- -- 102 -- --   10/25/23 2337 (!) 84/51 -- -- 118 19 --   10/25/23 2314 (!) 79/48 99.3  F (37.4  C) Oral 105 (!) 34 100 %   10/25/23 2313 (!) 78/48 -- -- 106 -- --   10/25/23 2240 (!) 72/49 -- -- 103 29 (!) 66 %   10/25/23 2235 (!) 82/51 -- -- 108 30 95 %   10/25/23 2200 92/59 -- -- 104 28 96 %   10/25/23 2100 92/60 -- -- 114 (!) 37 99 %   10/25/23 2052 (!) 88/62 -- -- (!) 125 (!) 36 100 %   10/25/23 2049 (!) 84/59 -- -- (!) 127 29 100 %   10/25/23 2021 94/58 100  F (37.8  C) Oral (!) 123 (!) 33 96 %   10/25/23 1510 117/71 -- -- 105 (!) 31 100 %   10/25/23 1430 -- -- -- 117 (!) 47 100 %   10/25/23 1420 -- -- -- 105 27 100 %   10/25/23 1410 -- -- -- 102 28 99 %   10/25/23 1400 -- -- -- 106 (!) 31 100 %   10/25/23 1300 99/77 -- -- 114 -- 100 %   10/25/23 1200 -- -- -- 116 24 100 %   10/25/23 1134 114/76 97.9  F (36.6  C) Oral 116 19 97 %     Ranges for vital signs:  Temp:  [97.9  F (36.6  C)-100  F (37.8  C)] 99.3  F (37.4  C)  Pulse:  [] 103  Resp:   "[19-47] 33  BP: ()/(38-77) 81/54  SpO2:  [66 %-100 %] 100 %  There were no vitals filed for this visit.    Physical Examination:  GENERAL:  in bed in no acute distress; O2 in place.  HEAD:  Head is normocephalic, atraumatic   EYES:  Eyes have anicteric sclerae without conjunctival injection   ENT:  Oropharynx is moist without exudates or ulcers. Tongue is midline. Fair dentition.  NECK:  Supple. No cervical lymphadenopathy  LUNGS:  Right lung field rhonchi. Left relatively clear. No TTP over chest wall.   CARDIOVASCULAR:  Regular rate and rhythm with no murmurs, gallops or rubs.  ABDOMEN:  Normal bowel sounds, soft, nontender. No appreciable hepatosplenomegaly.  SKIN:  No acute rashes.  Line in place without any surrounding erythema or exudate.  NEUROLOGIC:  Grossly nonfocal. Active x4 extremities         Laboratory Data:     No results found for: \"ACD4\", \"HIVPCR\"    Inflammatory Markers    Recent Labs   Lab Test 10/27/19  1147 10/07/19  1221 10/06/19  1444 09/13/19  0934 09/11/19  2325 08/22/19  0820 05/16/18  1006   SED  --  93*  --  111* 102*  --   --    CRP  --   --  25.0* 58.0* 66.0*   < >  --    G6PD 19.0*  --   --   --   --   --   --    E4SRVFL  --   --   --   --   --   --  113    < > = values in this interval not displayed.       Immune Globulin Studies     Recent Labs   Lab Test 10/24/23  1033 09/15/21  0915 01/31/21  0441 01/25/21  0406 10/27/19  0621 03/01/18  0356 02/19/18  0759   IGG  --  1,198 763  --  936 698 1,790*   IGM  --   --   --   --   --   --  502*   IGE <2  --   --  <2  --   --  <2   IGA  --   --   --   --   --   --  425*   IGG1  --   --   --   --   --   --  1,300*   IGG2  --   --   --   --   --   --  131*   IGG3  --   --   --   --   --   --  101   IGG4  --   --   --   --   --   --  1*       Metabolic Studies       Recent Labs   Lab Test 10/26/23  0606 10/26/23  0054 10/25/23  1356 10/24/23  1033 06/27/22  1013 05/26/22  0750 09/16/21  0749 09/15/21  0915 10/22/19  1314 10/21/19  1752 " 08/13/19  0000 08/07/19  0959   * 132* 133* 138   < > 137   < > 136   < > 133   < > 133   POTASSIUM 4.5 4.1 4.4 3.7   < > 3.5   < > 4.8   < > 5.0   < > 4.2   CHLORIDE 94* 94* 96* 96*   < > 96   < > 98   < > 109   < > 98   CO2 24 25 26 31*   < > 32   < > 26   < > 10*   < > 29   ANIONGAP 15 13 11 11   < > 9   < > 12   < > 13   < > 6   BUN 31.7* 29.5* 24.6* 14.6   < > 42*   < > 79*   < > 66*   < > 27   CR 5.61* 5.32* 4.90* 3.43*   < > 3.98*   < > 5.63*   < > 5.76*   < > 1.80*   GFRESTIMATED 8* 9* 10* 15*   < > 12*   < > 8*   < > 8*   < > 31*   GLC 75 81 92 120*   < > 110*   < > 152*   < > 138*   < > 119*   A1C  --   --   --   --   --  5.2  --  5.8*   < >  --   --   --    CHELSEY 8.4* 8.0* 8.3* 9.0   < > 9.5   < > 8.6   < > 8.0*   < > 9.8   PHOS 3.9  --   --   --   --   --   --   --    < > 6.7*   < >  --    MAG  --   --   --  1.9   < > 1.8   < >  --    < > 2.0   < > 2.2   LACT  --  1.1 1.1  --    < >  --    < >  --    < >  --    < >  --    PCAL  --   --  0.70*  --   --   --    < >  --    < >  --    < >  --    FGTL  --   --   --   --   --   --   --  210   < >  --    < >  --    CKT  --   --   --   --   --   --   --   --   --  70  --  36    < > = values in this interval not displayed.       Hepatic Studies    Recent Labs   Lab Test 10/26/23  0606 10/26/23  0054 10/25/23  1356 10/24/23  1033 07/11/23  0952 05/26/23  0828 05/27/21  1205 05/01/21  0654 01/27/21  0414 01/26/21  0425 01/24/21  1458 11/19/19  1453 11/11/19  1131   BILITOTAL 1.5* 1.5*   < > 0.9   < >  --    < >  --    < > 0.8 1.2   < > 0.4   70208  --   --   --   --   --  0.5   < >  --    < >  --   --    < >  --    DBIL  --   --   --  0.50*   < >  --    < >  --    < > 0.6*  --    < >  --    ALKPHOS 186* 168*   < > 306*   < >  --    < >  --    < > 184* 244*   < > 316*   96022  --   --   --   --   --  259*   < >  --    < >  --   --    < >  --    PROTTOTAL 6.3* 5.5*   < > 7.4   < >  --    < > 7.2   < > 6.0*  6.0* 6.1*   < > 7.2   02380  --   --   --   --   --   7.1   < >  --    < >  --   --    < >  --    ALBUMIN 3.2* 2.9*   < > 3.6   < >  --    < >  --    < > 2.0* 2.0*   < > 3.0*   19162  --   --   --   --   --  3.2*   < >  --    < >  --   --    < >  --    AST 26 24   < > 39   < >  --    < >  --    < > 11 31   < > 30   15903  --   --   --   --   --  31   < >  --    < >  --   --    < >  --    ALT 24 22   < > 31   < >  --    < >  --    < > 30 35   < > 27   34204  --   --   --   --   --  48   < >  --    < >  --   --    < >  --    LDH  --   --   --   --   --   --   --  164  --  187  --   --   --    GGT  --   --   --   --   --   --   --   --   --   --   --   --  510*   SALAS  --   --   --   --   --   --   --   --   --   --  <10*  --   --     < > = values in this interval not displayed.       Pancreatitis testing    Recent Labs   Lab Test 10/25/23  1356 05/26/22  0750 12/31/19  1033 10/24/19  2032 10/21/19  1451 10/06/19  1444 03/04/18  0353 02/19/18  0759   AMYLASE  --   --   --   --   --   --   --  58   LIPASE 24  --   --  212 119 172   < >  --    TRIG  --  155*   < >  --   --   --    < > 115    < > = values in this interval not displayed.       Gout Labs    No lab results found.    Hematology Studies   Recent Labs   Lab Test 10/26/23  0606 10/26/23  0054 10/25/23  1356 10/24/23  1033 10/17/23  1011 10/11/23  0806 04/22/21  1022 04/08/21  0722 02/27/21  0853 02/25/21  0542   WBC 8.5 9.9 11.6* 3.9*   < >  --    < > 6.3   < > 2.9*   01105  --   --   --   --   --  3.7*  3.7*   < >  --    < >  --    ANEU  --   --   --   --   --   --   --  4.8  --  1.9   ANEUTAUTO 8.1  --  11.0* 3.1   < >  --    < >  --   --   --    ALYM  --   --   --   --   --   --   --  0.7*  --  0.6*   ALYMPAUTO 0.1*  --  0.2* 0.4*   < >  --    < >  --   --   --    SHERRI  --   --   --   --   --   --   --  0.8  --  0.4   AMONOAUTO 0.1  --  0.1 0.2   < >  --    < >  --   --   --    AEOS  --   --   --   --   --   --   --  0.1  --  0.1   AEOSAUTO 0.1  --  0.1 0.2   < >  --    < >  --   --   --    ABSBASO 0.0  --   "0.0 0.0   < >  --    < >  --   --   --    HGB 10.5* 8.6* 10.1* 10.9*   < >  --    < > 9.7*   < > 9.9*   14691  --   --   --   --   --  11.4*  11.4*   < >  --    < >  --    HCT 33.3* 27.1* 30.8* 33.4*   < >  --    < > 29.9*   < > 32.0*   * 131* 142* 165   < >  --    < > 178   < > 293   92103  --   --   --   --   --  167  167   < >  --    < >  --     < > = values in this interval not displayed.       Clotting Studies    Recent Labs   Lab Test 10/24/23  1033 08/16/23  0827 05/26/22  0750 09/15/21  0915 02/19/21  0458 02/12/21  0315   INR 0.99 0.9  0.9 0.96 0.96   < >  --    PTT  --   --   --   --   --  28    < > = values in this interval not displayed.       Iron Testing    Recent Labs   Lab Test 10/26/23  0606 02/12/21  0315 02/11/21  0645 02/04/21  0310 02/03/21  0415 12/14/18  0951 12/13/18  1033 09/04/18  1052 08/20/18  0553 08/02/18  1100 08/01/18  0921   IRON  --   --   --   --  51  --  16*  --   --   --  93   FEB  --   --   --   --  143*  --  221*  --   --   --  248   IRONSAT  --   --   --   --  36  --  7*  --   --   --  37   YOLA  --   --   --   --   --   --  302*  --   --   --  571*   *   < > 92   < > 91   < > 107*   < >  --    < > 101*   FOLIC  --   --   --   --   --   --   --   --  43.4  --   --    B12  --   --   --   --   --   --   --   --   --   --  1,753*   HAPT  --   --   --   --   --   --  296*  --   --   --   --    RETP  --   --  2.9*  --   --    < > 4.6*  --   --    < >  --    RETICABSCT  --   --  72.4  --   --    < > 94.2  --   --    < >  --     < > = values in this interval not displayed.       Markers  No lab results found.    Invalid input(s): \"FETOPROTEIN\", \"SERUM\", \"AFP\"    Autoimmune Testing    Recent Labs   Lab Test 05/16/18  1006 02/22/18  1800   J0NVTNA 113  --    RNPIGG 0.2  --    SMIGG <0.2  --    SSAIGG 2.8*  --    SSBIGG 7.3*  --    SCLIGG <0.2 <0.2       Arterial Blood Gas Testing    Recent Labs   Lab Test 10/17/23  1035 02/10/21  2000 02/10/21  1555 02/09/21  1225 " "02/09/21  0403 02/08/21  1200   PH  --  7.41 7.36 7.38 7.42 7.40   PCO2  --  42 46* 49* 44 47*   PO2  --  90 105 74* 116* 72*   HCO3  --  26 26 29* 28 29*   O2PER 21.0 50 50 60 60.0 60        Thyroid Studies     Recent Labs   Lab Test 07/11/23  0952 08/01/18  0921 02/19/18  0759   TSH 1.23 1.80 3.07       Urine Studies     Recent Labs   Lab Test 01/24/21  1729 10/21/19  2240 09/12/19  0125 08/07/19  1512 03/04/18  1425   URINEPH 5.0 5.0 7.5* 7.0 5.5   NITRITE Negative Negative Negative Negative Negative   LEUKEST Moderate* Large* Negative Trace* Negative   WBCU 34* 115* 3 19* 5   UWBCLM Present*  --   --   --   --        Medication levels    Recent Labs   Lab Test 10/26/23  0606 10/24/23  1033 03/31/20  1131 03/19/20  1032   VANCOMYCIN 18.6  --    < >  --    VCON  --   --   --  1.4   TACROL  --  7.8   < > 10.3    < > = values in this interval not displayed.       CSF testing   No lab results found.    Invalid input(s): \"CADAM\", \"EVPCR\", \"ENTPCR\", \"ENTEROVIRUS\"    Body fluid stats    Recent Labs   Lab Test 08/17/23  1144 08/17/23  1137 02/11/22  1308 02/11/22  1308 04/30/21  1937 04/29/21  1315 02/03/21  0941 01/25/21  1347 01/24/21  1629 02/20/20  0800 10/22/19  1430 01/17/19  1207 12/14/18  1054   FTYP  --   --   --   --  Pleural fluid  --   --  Pleural fluid Bronchoalveolar Lavage   < > Ascites   < > Bronchial washing   FCOL Pink* Colorless  --  Colorless Slightly Bloody  --   --  Yellow Pink   < > Yellow   < > [place]   FAPR Hazy* Hazy*   < > Cloudy* Cloudy  --   --  Cloudy Slightly Cloudy   < > Clear   < > Turbid   FRBC  --   --   --   --   --   --   --   --   --   --   --   --  << Do Not Report >>   FWBC 267 236  --  63 9460  --   --  660 355   < > 44   < > 9520   FNEU 7 2   < >  --   --   --   --  87 81   < > 4   < > 94   FLYM 4 4   < > 12  --   --   --   --  5   < > 49  --  1   FMONO 78 88   < >  --   --   --   --  13  --    < >  --    < > 4   FBAS 1  --   --   --   --   --   --   --   --    < >  --   --   --  "   FOTH  --   --   --  88 0  --   --   --  11  --  47   < >  --    FALB  --   --   --   --   --   --   --   --   --   --  0.8  --   --    FTP  --   --   --   --  3.3  --   --  3.4  --   --  1.7  --   --    GS  --   --   --   --   --  No organisms seen  Rare  WBC'S seen  predominantly PMN's     < > No organisms seen  Moderate  WBC'S seen  predominantly mononuclear cells   >25 PMNs/low power field  No organisms seen   < > No organisms seen  Few  WBC'S seen  predominantly mononuclear cells    Quantification of host cells and microbiological organisms was done on a cytocentrifuged   preparation.     < > >25 PMNs/low power field  Many  Gram negative diplococci  *  Moderate  Gram positive cocci  *    < > = values in this interval not displayed.       Microbiology:  Fungal testing  Recent Labs   Lab Test 10/17/23  1105 08/17/23  1144 08/17/23  1137 09/15/21  0915 04/30/21  2036 10/07/19  1544 09/14/19  1625   FGTL  --   --   --  210 459   < > 122   FGTLI  --   --   --  Positive* Positive*   < > Positive*   PJRDFA Not Detected Not Detected   < >  --   --    < >  --    ASPGAI  --  0.05   < >  --  0.08   < > 1.08   ASPAG  --  Negative   < >  --   --    < >  --    ASPGAA  --   --   --   --  Negative   < > Positive*   CIABB  --   --   --   --   --   --  <1:2    < > = values in this interval not displayed.       Last Culture results   S Pneumoniae Antigen   Date Value Ref Range Status   01/24/2021   Final    Negative, no Streptococcus pneumoniae antigen detected by immunochromatographic membrane   assay. A negative Streptococcus pneumoniae antigen result does not rule out infection with   Streptococcus pneumoniae.       P. jirovecii By PCR   Date Value Ref Range Status   10/17/2023 Not Detected  Final     Comment:     NOT DETECTED - A negative result does not rule out the   presence of PCR inhibitors in the patient specimen or   assay specific nucleic acid in concentrations below the   level of detection by the  assay.    This test was developed and its performance characteristics   determined by [employer]. It has not been cleared or   approved by the US Food and Drug Administration. This test   was performed in a CLIA certified laboratory and is   intended for clinical purposes.  Performed By: [facility]  [address] [place] [address]  : Rogelio Llanos MD, PhD  CLIA Number: [id number]   08/17/2023 Not Detected  Final     Comment:     NOT DETECTED - A negative result does not rule out the   presence of PCR inhibitors in the patient specimen or   assay specific nucleic acid in concentrations below the   level of detection by the assay.    This test was developed and its performance characteristics   determined by [employer]. It has not been cleared or   approved by the US Food and Drug Administration. This test   was performed in a CLIA certified laboratory and is   intended for clinical purposes.  Performed By: [facility]  [address] [place] [address]  : Rogelio Llanos MD, PhD  CLIA Number: [id number]   08/17/2023 Not Detected  Final     Comment:     NOT DETECTED - A negative result does not rule out the   presence of PCR inhibitors in the patient specimen or   assay specific nucleic acid in concentrations below the   level of detection by the assay.    This test was developed and its performance characteristics   determined by [employer]. It has not been cleared or   approved by the US Food and Drug Administration. This test   was performed in a CLIA certified laboratory and is   intended for clinical purposes.  Performed By: [facility]  [address] [place] [address]  : Rogelio Llanos MD, PhD  CLIA Number: [id number]   01/24/2021 Detected (A)  Final     Comment:     (Note)  DETECTED - P. jirovecii DNA detected in this specimen.  Results should be used to aid in the  diagnosis of PCP  pneumonia and must be interpreted in the context of host  risk factors, clinical presentation, and radiographic  imaging.  TEST INFORMATION: Pneumocystis jirovecii Detection by PCR  Test developed and characteristics determined by [facility]. See Compliance Statement B: [facility]  Performed by [facility],  [address] [place] [address] [phone]  [email or web address], Carri Red MD, Lab. Director       Culture   Date Value Ref Range Status   10/25/2023 No growth after 12 hours  Preliminary   10/25/2023 No growth after 12 hours  Preliminary   10/17/2023 1+ Actinomyces species (A)  Final     Comment:     Susceptibilities not routinely done, refer to antibiogram to view typical susceptibility profiles  This organism is part of normal alo, but on occasion may be a true pathogen. Clinical correlation must be applied to interpreting this result.   10/17/2023 1+ Normal alo  Final   10/17/2023 No growth after 8 days  Preliminary   10/17/2023 No growth after 8 days  Preliminary   08/17/2023 No Growth  Final   08/17/2023 No Growth  Final   08/17/2023 1+ Normal alo  Final   08/17/2023 1+ Stenotrophomonas maltophilia (A)  Final     Comment:     Susceptibilities done on previous cultures   08/17/2023 No Growth  Final   08/17/2023 No Growth  Final   08/17/2023 1+ Normal alo  Final   08/17/2023 1+ Stenotrophomonas maltophilia (A)  Final   02/11/2022 No Actinomyces isolated  Final   02/11/2022 No Growth  Final   02/11/2022 No Growth  Final   09/23/2021   Final    >10 Squamous epithelial cells/low power field indicates oral contamination. Please recollect.   09/23/2021 No Growth  Final   09/20/2021 No Growth  Final   09/20/2021 No Growth  Final     Culture Micro   Date Value Ref Range Status   05/01/2021   Final    Quantity not sufficient  Called to Maxim Norman at 1236 5/1/21.    mlb     05/01/2021 Culture negative after 30 days  Final   04/29/2021 No anaerobes isolated  Final  "  04/29/2021 No growth  Final   02/19/2021 Culture negative for acid fast bacilli  Final   02/19/2021   Final    Assayed at [facility] [address] [place] [address] [phone]   02/19/2021 Culture negative after 4 weeks  Final   02/19/2021 No growth after 4 weeks  Final   02/19/2021 Moderate growth  Staphylococcus epidermidis   (A)  Final   02/09/2021 No growth  Final         Last checks of Clostridioides difficile testing  Recent Labs   Lab Test 02/13/21  0530 12/04/18  1500 08/02/18  2253 03/24/18  1022   CDBPCT Negative Negative Positive* Negative       Syphilis Testing    No results found for: \"TREPT\", \"TREPAB\", \"RPR\", \"TPPAT\", \"CVD\"    Quantiferon testing   Recent Labs   Lab Test 10/26/23  0606 10/25/23  1356 09/20/21  1903 05/01/21  1102 02/22/21  0527 02/19/21  0853 02/11/21  0645 01/25/21  1347 01/24/21  1629 10/28/19  0553 10/25/19  1628 10/06/19  1444 09/14/19  1625 02/21/18  1439 02/19/18  0759   TBRES  --   --   --   --   --   --   --   --   --   --  Negative  --  Negative  --   --    TBRSLT  --   --   --   --   --   --   --   --   --   --   --   --   --   --  Negative   TBAGN  --   --   --   --   --   --   --   --   --   --   --   --   --   --  0.00   LYMPH 1 2   < >  --    < >  --    < >  --   --    < >  --    < >  --    < > 8.4   AFBSMS  --   --   --  Quantity not sufficient  Called to Dr Maxim Norman at 1236 5/1/21.    mlb    --  Negative for acid fast bacteria  Less than 5ml of specimen received.  A minimum of 5 mL of sputum or fluid is recommended for recovery of acid fast bacilli   (AFB).  Volumes less than 5 mL are suboptimal and may compromise recovery of AFB from   culture.    Assayed at [facility] [address] [place] [id number] [phone]  --  Negative for acid fast bacteria  Less than 5ml of specimen received.  A minimum of 5 mL of sputum or fluid is recommended for recovery of acid fast bacilli   (AFB).  Volumes less than 5 mL are suboptimal and may " "compromise recovery of AFB from   culture.    Assayed at [facility] [address] [facility] [place] [address] [phone] Negative for acid fast bacteria  Less than 5ml of specimen received.  A minimum of 5 mL of sputum or fluid is recommended for recovery of acid fast bacilli   (AFB).  Volumes less than 5 mL are suboptimal and may compromise recovery of AFB from   culture.    Assayed at [facility] [address], [facility] [place], [place] [id number] [phone]   < >  --    < >  --    < >  --     < > = values in this interval not displayed.       Infection Studies to assess Diarrhea  No lab results found.    Invalid input(s): \"BIDYD\"    Virology:  Coronavirus-19 testing    Recent Labs   Lab Test 07/11/23  1132 10/12/22  1528 09/26/22  0956 05/23/22  1013 04/01/22  1257 02/07/22  1301 01/31/22  1309 01/24/22  1324 09/20/21  1859 09/15/21  0623 06/07/21  1310 05/02/21  0715 04/26/21  1151 04/08/21  0723   QDC50OR  --   --   --   --   --   --   --   --   --   --   --  Negative  --   --    [id number]  --   --   --   --   --   --   --   --   --   --   --  Not Applicable  --   --    [id number] Negative  --   --   --   --   --   --   --  Negative Negative  --   --   --  Test received-See reflex to IDDL test SARS CoV2 (COVID-19) Virus RT-PCR  NEGATIVE   [id number]  --   --   --   --   --   --   --   --   --   --   --   --   --  Nasopharyngeal   [id number]  --   --   --   --   --   --   --   --   --   --   --   --   --  Nasopharyngeal   COVIDPCREXT  --  Negative Negative Undetected   < > Undetected Undetected   < >  --   --    < >  --   --   --    SOUREXT  --   --   --  Nasopharynx  --  Nasopharynx Nasopharynx   < >  --   --    < >  --    < >  --    [id number]  --   --   --  Undetected  --  Undetected Undetected   < >  --   --    < >  --    < >  --     < > = values in this interval not displayed.       Respiratory virus (non-coronavirus-19) testing    Recent Labs   Lab Test 08/17/23  1144 08/17/23  1137 07/11/23  1132 " 01/24/21  1822 01/24/21  1629 12/23/20  1102 02/27/18  1016 02/22/18  0900   RVSPEC  --   --   --   --  Bronchial Bronchial   < >  --    AFLU  --   --   --   --   --   --   --  Duplicate request*   IFLUA Not Detected Not Detected Not Detected   < > Negative Negative   < >  --    INFZA  --   --   --   --   --   --   --  Negative   FLUAH1 Not Detected Not Detected Not Detected   < > Negative Negative   < >  --    [id number] Not Detected Not Detected Not Detected   < > Negative Negative   < >  --    FLUAH3 Not Detected Not Detected Not Detected   < > Negative Negative   < >  --    BFLU  --   --   --   --   --   --   --  Duplicate request*   IFLUB Not Detected Not Detected Not Detected   < > Negative Negative   < >  --    INFZB  --   --   --   --   --   --   --  Negative   PIV1 Not Detected Not Detected Not Detected   < > Negative Negative   < >  --    PIV2 Not Detected Not Detected Not Detected   < > Negative Negative   < >  --    PIV3 Not Detected Not Detected Not Detected   < > Negative Negative   < >  --    PIV4 Not Detected Not Detected Not Detected   < >  --   --    < >  --    IRSV  --   --   --   --   --   --   --  Negative   HRVS  --   --   --   --  Negative Negative   < >  --    RSVA Not Detected Not Detected Not Detected   < > Negative Negative   < >  --    RSVB Not Detected Not Detected Not Detected   < > Negative Negative   < >  --    HMPV Not Detected Not Detected Not Detected   < > Negative Negative   < >  --    ADVBE  --   --   --   --  Negative Negative   < >  --    ADVC  --   --   --   --  Negative Negative   < >  --    ADENOV Not Detected Not Detected Not Detected   < >  --   --    < >  --    CORONA Not Detected Not Detected Not Detected   < >  --   --    < >  --     < > = values in this interval not displayed.       CMV viral loads    Recent Labs   Lab Test 10/24/23  1033 10/17/23  1011 10/11/23  0806 10/04/23  0810 09/07/23  0710 08/17/23  1144 08/17/23  1137 08/08/23  0828 08/08/23  0828 07/11/23  0952  "05/26/23  0828 04/06/23  0725 03/08/23  0848 02/09/23  1034 11/18/22  0928 09/27/22  1012 06/27/22  1013 05/26/22  0750 03/03/22  1054 02/11/22  1308 02/10/22  0919 11/10/21  1106 10/12/21  1251 09/29/21  1105 09/21/21  1055 03/15/21  0904 02/25/21  0542   CMVQNT  --   --   --   --   --   --   --   --   --  Not Detected  --  Not Detected  --  <137*  --  Not Detected  --  <137*  --  Not Detected Not Detected  --  Not Detected  --  Not Detected  --  CMV DNA Not Detected   CMVRESINST 103*  80* 75*  --   --  521*  --   --   --  46*  --   --   --   --   --   --   --   --   --   --   --   --   --   --   --   --   --   --    CSPEC  --   --   --   --   --   --   --   --   --   --   --   --   --   --   --   --   --   --   --   --   --   --   --   --   --   --  EDTA PLASMA   CMVLOG 2.0  1.9 1.9  --   --  2.7  --   --    < > 1.7  --   --   --   --  <2.1  --   --   --  <2.1   < >  --   --   --   --   --   --   --  Not Calculated   [address]  --   --  2,680*  2,680*   < >  --   --   --   --   --   --    < >  --    < >  --    < >  --    < >  --    < >  --   --    < >  --    < >  --    < >  --    CMVQAL  --   --   --   --   --  Not Detected Not Detected  --   --   --   --   --   --   --   --   --   --   --   --   --   --   --   --   --   --   --   --     < > = values in this interval not displayed.       CMV resistance testing  Recent Labs   Lab Test 08/03/18  0624   CMVCID Sensitive   CMVFOS Sensitive   CMVGAN Sensitive       No results found for: \"H6RES\"    EBV DNA Copies/mL   Date Value Ref Range Status   08/08/2023 42,461 (H) <=0 copies/mL Final   07/11/2023 49,591 (H) <=0 copies/mL Final   04/06/2023 43,506 (H) <=0 copies/mL Final   03/01/2023 83,207 (H) <=0 copies/mL Final   02/09/2023 114,508 (H) <=0 copies/mL Final   09/27/2022 21,749 (H) <=0 copies/mL Final   05/26/2022 77,030 (H) <=0 copies/mL Final   02/10/2022 135,117 (H) <=0 copies/mL Final   10/12/2021 969,411 (H) <=0 copies/mL Final   09/15/2021 45,122 (H) <=0 " "copies/mL Final   05/01/2021 38,186 (A) EBVNEG^EBV DNA Not Detected [Copies]/mL Final   02/22/2021 75,709 (A) EBVNEG^EBV DNA Not Detected [Copies]/mL Final   01/25/2021 5,619 (A) EBVNEG^EBV DNA Not Detected [Copies]/mL Final   12/09/2020 10,686 (A) EBVNEG^EBV DNA Not Detected [Copies]/mL Final   09/09/2020 2,935 (A) EBVNEG^EBV DNA Not Detected [Copies]/mL Final   03/09/2020 8,918 (A) EBVNEG^EBV DNA Not Detected [Copies]/mL Final     EBV DNA Quant (External)   Date Value Ref Range Status   10/11/2023 <35 (A) Undetected IU/mL Final   05/26/2023 <35 (A) Undetected IU/mL Final   03/08/2023 <35 (A) undetected IU/ml Final   03/08/2023 <35 (A) Undetected IU/mL Final   01/13/2023 Undetected Undetected IU/mL Final     EBV Interp DNA Quant (External)   Date Value Ref Range Status   03/08/2023   Final    EBV DNA is detected, but level present is <35 IU/mL (<1.54 log IU/mL). This assay cannot accurately quantify EBV DNA below this level.       BK Virus Result   Date Value Ref Range Status   08/07/2019 BK Virus DNA Not Detected BKNEG^BK Virus DNA Not Detected copies/mL Final       Parvovirus Testing  No lab results found.    Invalid input(s): \"PRVRES\"    Adenovirus Testing  No lab results found.    Invalid input(s): \"ADENAB\", \"ADENOVIRUS\", \"ADQT\"    Hepatitis B Testing     Recent Labs   Lab Test 09/21/21  1055 04/30/21  1127 10/25/19  1800 06/05/19  1156 06/05/18  1029 03/01/18  0356   AUSAB 0.50 0.00   < >  --   --  0.41   HBCAB  --   --   --  Nonreactive  --  Nonreactive   HEPBANG Nonreactive Nonreactive   < > Nonreactive   < > Nonreactive    < > = values in this interval not displayed.        Hepatitis C Antibody   Date Value Ref Range Status   06/05/2019 Nonreactive NR^Nonreactive Final     Comment:     Assay performance characteristics have not been established for newborns,   infants, and children     02/19/2018 Nonreactive NR^Nonreactive Final     Comment:     Assay performance characteristics have not been established " for newborns,   infants, and children         CMV Antibody IgG   Date Value Ref Range Status   03/01/2018 Canceled, Test credited 0.0 - 0.8 AI Final     Comment:     Test reordered as correct code  SEE CMV QUANTITATIVE PCR     03/01/2018 >8.0 (H) 0.0 - 0.8 AI Final     Comment:     Positive  Antibody index (AI) values reflect qualitative changes in antibody   concentration that cannot be directly associated with clinical condition or   disease state.     02/19/2018 >8.0 (H) 0.0 - 0.8 AI Final     Comment:     Positive  Antibody index (AI) values reflect qualitative changes in antibody   concentration that cannot be directly associated with clinical condition or   disease state.       Varicella Zoster Virus Antibody IgG   Date Value Ref Range Status   02/19/2018 3.8 (H) 0.0 - 0.8 AI Final     Comment:     Positive, suggests prev. exposure and probable immunity  Antibody index (AI) values reflect qualitative changes in antibody   concentration that cannot be directly associated with clinical condition or   disease state.       EBV Capsid Antibody IgG   Date Value Ref Range Status   03/01/2018 >8.0 (H) 0.0 - 0.8 AI Final     Comment:     Positive, suggests recent or past exposure  Antibody index (AI) values reflect qualitative changes in antibody   concentration that cannot be directly associated with clinical condition or   disease state.     02/19/2018 >8.0 (H) 0.0 - 0.8 AI Final     Comment:     Positive, suggests recent or past exposure  Antibody index (AI) values reflect qualitative changes in antibody   concentration that cannot be directly associated with clinical condition or   disease state.       Toxoplasma Antibody IgG   Date Value Ref Range Status   02/19/2018 <3.0 0.0 - 7.1 IU/mL Final     Comment:     Negative- Absence of detectable Toxoplasma gondii IgG antibodies. A negative   result does not rule out acute infection.  The magnitude of the measured result is not indicative of the amount of   antibody  present. The concentrations of anti-Toxoplasma gondii IgG in a given   specimen determined with assays from different manufacturers can vary due to   differences in assay methods and reagent specificity.       Herpes Simplex Virus Type 1 IgG   Date Value Ref Range Status   03/01/2018 >8.0 (H) 0.0 - 0.8 AI Final     Comment:     Positive.  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  Antibody index (AI) values reflect qualitative changes in antibody   concentration that cannot be directly associated with clinical condition or   disease state.     02/19/2018 >8.0 (H) 0.0 - 0.8 AI Final     Comment:     Positive.  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  Antibody index (AI) values reflect qualitative changes in antibody   concentration that cannot be directly associated with clinical condition or   disease state.       Herpes Simplex Virus Type 2 IgG   Date Value Ref Range Status   03/01/2018 <0.2 0.0 - 0.8 AI Final     Comment:     No HSV-2 IgG antibodies detected.  Antibody index (AI) values reflect qualitative changes in antibody   concentration that cannot be directly associated with clinical condition or   disease state.     02/19/2018 <0.2 0.0 - 0.8 AI Final     Comment:     No HSV-2 IgG antibodies detected.  Antibody index (AI) values reflect qualitative changes in antibody   concentration that cannot be directly associated with clinical condition or   disease state.         Imaging:  Recent Results (from the past 48 hour(s))   XR Chest 2 Views    Narrative    EXAM: XR CHEST 2 VIEWS  10/24/2023 10:23 AM     HISTORY:  Lung replaced by transplant (H)       COMPARISON:  9/7/2023    TECHNIQUE: PA and lateral views the chest    FINDINGS:   Postoperative changes of bilateral lung transplantation. Surgical  clips project over the right upper lobe..    Heart size is normal. Low lung volumes. No pneumothorax or pleural  effusion. Streaky perihilar and bibasilar opacities are not  significantly changed. No focal airspace opacity. No acute  osseous  abnormality. Degenerative changes of the spine. Similar appearing  prominent gaseous distended loops of bowel without evidence of  obstruction.      Impression    IMPRESSION: Stable post surgical changes of bilateral lung  transplantation with continued low lung volumes with  perihilar/bibasilar atelectasis versus scarring. No acute airspace  disease.    I have personally reviewed the examination and initial interpretation  and I agree with the findings.    TERRI ISSA MD         SYSTEM ID:  [id number]   CT Chest w/o Contrast    Narrative    Chest CT without contrast    INDICATION: Bilateral lung transplant. On chronic antifungal. Post  dilation last week    COMPARISON: 8/8/2023    FINDINGS: No contrast. Inspiration and expiration supine imaging  obtained. The included thyroid appears unremarkable. There is left  breast skin thickening an some edema in the left breast. Heart is  mildly prominent in size. Median sternotomy from prior bilateral lung  transplant. Thoracic aorta is mildly ectatic at approximately 4.1 cm  in the mid ascending portion. Pulmonary artery is borderline enlarged  at 3.3 cm. Calcified right hilar lymph node.  Gallstones.  Pleural thickening mild in both lung bases with trace pleural  effusions. Rim calcified density posterior to the distal descending  thoracic aorta is unchanged. Apparent surgical material at the left  pulmonary venous confluence with left atrium unchanged. No ectopic  air.  Left breast skin thickening and breast tissue edema.  No other suspicious upper abdominal finding. There is splenic artery  diffuse tortuosity and atherosclerotic calcification mild abdominal  aortic calcification.  No enlarged lymph nodes in the chest.  Bone detail other than the sternotomy shows diffuse osteopenia. T5  vertebral compression deformity grossly unchanged.    Detail of the lungs post pleural thickening anteriorly at the left  upper lobe which is markedly increased (series 3  image 81) which is  not simple fat density. Increasing septal thickening noted. Patchy  opacities in the inferior right upper lobe and also peripherally in  the right middle and lower lobes are increased with air bronchogram  formation again clearly visible especially in the lower lobe.  Consolidative opacities are definitely worse in the right lower lobe  and mildly increased in the left lower lobe an minimally increased in  the left upper lobe as well. Accessory fissure in the right upper line  medially.  Expiration imaging shows mild air trapping.      Impression    IMPRESSION: Worsening consolidation bilaterally concerning for  worsening infection. New prominent left upper lobe anterior  pleural-based soft tissue thickening. Recommend follow to clearing.  Neoplasm somewhat less likely given the short interval but cannot be  entirely excluded. Mild air trapping.  Ectasia of the mid ascending thoracic aorta.  Left breast skin thickening and edema in the breast tissues again  present. Rim calcified almost certainly benign density at the left  lung base. Trace pleural effusions bilaterally.  Unchanged osteopenic T5 compression deformity.  Atherosclerosis.    TERRI ISSA MD         SYSTEM ID:  [id number]   XR Chest 2 Views    Narrative    XR CHEST 2 VIEWS  10/25/2023 1:10 PM     HISTORY:  cough, B lung transplant       COMPARISON:  CT chest 10/24/2023, and chest radiograph 9/7/2023    TECHNIQUE: XR CHEST 2 VIEWS    FINDINGS:   Unchanged postsurgical changes related to bilateral lung transplant  including midline sternotomy wires, and right axillary and mediastinal  surgical clips.  Low lung volumes with scattered basilar predominant linear  interstitial opacities, particularly at the right lung base. Bilateral  perihilar airspace opacification.  Small bilateral pleural effusions. No pneumothorax. Cardiac silhouette  is normal.        Impression    IMPRESSION:  Low lung volumes with findings reflective of  moderate pulmonary edema.  Cannot rule out superimposed infectious process    I have personally reviewed the examination and initial interpretation  and I agree with the findings.    TERRI ISSA MD         SYSTEM ID:  [id number]   US Upper Extremity Venous Duplex Left    Narrative    EXAMINATION: DOPPLER VENOUS ULTRASOUND OF THE LEFT UPPER EXTREMITY,  10/25/2023 3:21 PM     COMPARISON: None.    HISTORY: Marked left upper extremity swelling. Dialysis fistula is  present in the left upper extremity.    TECHNIQUE:  Gray-scale evaluation with compression, spectral flow and  color Doppler assessment of the deep venous system of the left upper  extremity.    FINDINGS:    Left: Normal blood flow and waveforms are demonstrated in the internal  jugular, innominate, subclavian, and axillary veins. There is normal  compressibility of the brachial, basilic and cephalic veins.      Impression    IMPRESSION:  No left upper extremity deep venous thrombosis  demonstrated.    I have personally reviewed the examination and initial interpretation  and I agree with the findings.    EDIE VILLA MD         SYSTEM ID:  [id number]   US Lower Extremity Venous Duplex Bilateral    Impression    RESIDENT PRELIMINARY INTERPRETATION  IMPRESSION:  No evidence of deep venous thrombosis in either lower extremity.     CT CHEST 10/24/2023                                                                   IMPRESSION: Worsening consolidation bilaterally concerning for  worsening infection. New prominent left upper lobe anterior  pleural-based soft tissue thickening. Recommend follow to clearing.  Neoplasm somewhat less likely given the short interval but cannot be  entirely excluded. Mild air trapping.  Ectasia of the mid ascending thoracic aorta.  Left breast skin thickening and edema in the breast tissues again  present. Rim calcified almost certainly benign density at the left  lung base. Trace pleural effusions bilaterally.  Unchanged  osteopenic T5 compression deformity.  Atherosclerosis.

## 2023-10-26 NOTE — PROGRESS NOTES
Rapid Response     Rapid was called  due to increased RR, HR and low BP and temp of 100. Pt stated that she didn't feel good. New orders received  Action:  EKG ordered, albumin given    2250.    BP (!) 72/49   Pulse 103   Temp 100  F (37.8  C) (Oral)   Resp 29   LMP 06/07/2014 (Exact Date)   SpO2 (!) 66%    Team notified of BP.

## 2023-10-26 NOTE — ED NOTES
Ksenia BROWN pts BPs is currently 79/48, RR 30s, -110s, Temp- 99.3 orally. pt states she is feeling better than earlier.

## 2023-10-26 NOTE — CODE/RAPID RESPONSE
"   10/25/23 2000   Call Information   Date of Call 10/25/23   Time of Call 2057   Name of person requesting the team Scott ANDERSON   Title of person requesting team RN   RRT Arrival time 2101   Time RRT ended 2137   Reason for call   Type of RRT Adult   Primary reason for call Cardiovascular   Cardiovascular Other (describe)  (HR increasing, BP downtrending, increased RR)   Was patient transferred from the ED, ICU, or PACU within last 24 hours prior to RRT call? No   SBAR   Situation Called for increasing HR and decreasing BP, incresed RR, c/f sepsis   Background Per H&P \"62 year old female admitted on 10/25/2023. 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 VSS except heart rate 110s-120s, SBP 80s-90s (maps low 70s). Denies SOB and pain.   Interventions Fluid bolus;ECG;Neb treatment;Meds  (albumin, tylenol for temp 99.1)   Patient Outcome   Patient Outcome   (Pt. still boarding in ED)   RRT Team   Attending/Primary/Covering Physician Shavon   Physician(s) Margo Chase PA   Lead RN Lynn Elaine   RT Huang Renae   Other staff Carisa Gillette   Post RRT Intervention Assessment   Post RRT Assessment Worsened   Date Follow Up Done 10/26/23   Time Follow Up Done 0120   Comments Remains hypotensive, SBP now 70s-80s (MAPs 50s-low 60s). Per bedside nurse, shavon team aware and assessed patient, they did not want currently more fluids, ordered chest CT. Pt. feeling slightly better than earlier.     "

## 2023-10-26 NOTE — ED NOTES
Pt states did not have dialysis yesterday.  Pt on MWF schedule, but also states provider this AM told her next dialysis is Friday. Dialysis will contact nephrologist.

## 2023-10-26 NOTE — PHARMACY-VANCOMYCIN DOSING SERVICE
"Pharmacy Vancomycin Note  Date of Service 2023  Patient's  1960   62 year old, female    Indication: Healthcare-Associated Pneumonia  Day of Therapy: 2  Current vancomycin regimen:  Intermittent dosing  Current vancomycin monitoring method: Trough (Method 2 = manual dose calculation)  Current vancomycin therapeutic monitoring goal: 15-20 mg/L    Current estimated CrCl = Estimated Creatinine Clearance: 10.2 mL/min (A) (based on SCr of 5.61 mg/dL (H)).    Creatinine for last 3 days  10/24/2023: 10:33 AM Creatinine 3.43 mg/dL  10/25/2023:  1:56 PM Creatinine 4.90 mg/dL  10/26/2023: 12:54 AM Creatinine 5.32 mg/dL;  6:06 AM Creatinine 5.61 mg/dL    Recent Vancomycin Levels (past 3 days)  10/26/2023:  6:06 AM Vancomycin 18.6 ug/mL    Vancomycin IV Administrations (past 72 hours)                     vancomycin (VANCOCIN) 1,250 mg in 0.9% NaCl 250 mL intermittent infusion (mg) 1,250 mg New Bag 10/25/23 172                    Nephrotoxins and other renal medications (From now, onward)      Start     Dose/Rate Route Frequency Ordered Stop    10/26/23 0800  tacrolimus (GENERIC) capsule 0.5 mg        Note to Pharmacy: PTA Sig:Take 1 capsule (0.5 mg) by mouth every morning Total dose: 0.5 mg in AM and 1 mg in PM      0.5 mg Oral EVERY MORNING 10/25/23 1715      10/25/23 2100  piperacillin-tazobactam (ZOSYN) 2.25 g vial to attach to  ml bag        Note to Pharmacy: For [facility], [facility] and [facility]: For Zosyn-naive patients, use the \"Zosyn initial dose + extended infusion\" order panel.    2.25 g  over 30 Minutes Intravenous EVERY 6 HOURS 10/25/23 1740      10/25/23 2000  tacrolimus (GENERIC) capsule 1 mg        Note to Pharmacy: PTA Sig:Take 1 capsule (1 mg) by mouth every evening Total dose: 0.5 mg in AM and 1 mg in PM      1 mg Oral EVERY EVENING. 10/25/23 171                 Contrast Orders - past 72 hours (72h ago, onward)      None            Interpretation of levels and current regimen:  Vancomycin level is " reflective of subtherapeutic level    Has serum creatinine changed greater than 50% in last 72 hours: No    Urine output:  anuric    Renal Function: ESRD on Dialysis    Plan:  Vanco level this AM  was 18.6 mg/L, plan was for a 10 - 15mg/kg IV x1 dose post HD -- vancomycin now discontinued  Vancomycin monitoring method: Trough (Method 2 = manual dose calculation)  Vancomycin therapeutic monitoring goal: 15-20 mg/L  Pharmacy will check vancomycin levels as appropriate in 1-3 Days.  Serum creatinine levels will be ordered daily for the first week of therapy and at least twice weekly for subsequent weeks.    Magnolia Fang RPH

## 2023-10-26 NOTE — ED NOTES
Paged linnette at 2320 and 0011 about patients VS. Pt is hypotensive and tachy. Pt is asymptomatic A&OX4. Orders were placed at 0040 to give fluids and draw labs. MD came down to assess pt and fluids were stopped at 0120 per MD. MD put order in for US and discontinued CT at this moment.   MD is aware of BPS being 70s/40s at this time     Courtney J. Gosselin, RN

## 2023-10-26 NOTE — ED NOTES
Talked with Shavon Resident about patients BP.   Was told that they will touch base in the morning with the day team to talk about midodrine and compression socks. Team is aware of the patients soft BPS.

## 2023-10-26 NOTE — CODE/RAPID RESPONSE
Rapid Response Team Note    Assessment   In assessment a rapid response was called on Kecia Blue due to hypotension. This presentation is likely due to suspected infection     Plan   -  MAPs >75, will give albumin fluid bolus   - give mucomyst neb  - EKG unchanged from previous   - attempt sputum culture   -  The Internal Medicine primary team was able to be reached and they are in agreement with the above plan.  -  Disposition: The patient will remain on the current unit. We will continue to monitor this patient closely.  -  Reassessment and plan follow-up will be performed by the primary team    Margo Chase PA-C  [facility] RRT [facility] Job Code Contact #[phone]  [facility] Paging/Directory    Hospital Course   Brief Summary of events leading to rapid response:   Rapid response called for tachycardia, hypotension, and tachypnea.  Patient currently admitted with concerns for a pulmonary infection.   Patient states she can't stop coughing.  She feels terrible and tired, but notes it is unchanged from initial presentation.     Admission Diagnosis:   S/P lung transplant (H) [Z94.2]    Physical Exam   Temp: 100  F (37.8  C) Temp  Min: 97.9  F (36.6  C)  Max: 100  F (37.8  C)  Resp: (!) 36 Resp  Min: 19  Max: 47  SpO2: 100 % SpO2  Min: 96 %  Max: 100 %  Pulse: (!) 125 Pulse  Min: 102  Max: 127    No data recorded  BP: (!) 88/62 Systolic (24hrs), Av , Min:84 , Max:117   Diastolic (24hrs), Av, Min:58, Max:77     I/Os: No intake/output data recorded.     Exam:   GENERAL: Alert and oriented x 3. NAD. Cooperative.   HEENT: Anicteric sclera. Mucous membranes moist. NC. AT.   CV: Tachycardia. S1, S2. No murmurs appreciated.   RESPIRATORY: tachypneic on 2Ls NC Lungs diminished in bilateral bases. Some wheezes noted with cough.   GI: Abdomen soft and non distended with normoactive bowel sounds present in all quadrants.   NEUROLOGICAL: No focal deficits. Moves all extremities  EXTREMITIES: Peripheral edema of  bilateral hands  SKIN: No jaundice. No rashes.        Significant Results and Procedures   Lactic Acid:   Recent Labs   Lab Test 10/25/23  1356 10/17/23  1035 09/23/21  1144 09/20/21  2128 05/07/21  1725 03/02/21  1727 02/28/21  0755   LACT 1.1 0.6 1.5   < >  --   --   --    LACTS  --   --   --   --  1.2 0.4* 1.0    < > = values in this interval not displayed.     CBC:   Recent Labs   Lab Test 10/25/23  1356 10/24/23  1033 10/17/23  1035 10/17/23  1011   WBC 11.6* 3.9*  --  2.4*   HGB 10.1* 10.9* 11.2* 9.5*   HCT 30.8* 33.4*  --  31.3*   * 165  --  130*        Sepsis Evaluation   The patient is known to have an infection.  Kecia Blue meets SIRS criteria but does NOT have a lactate >2 or other evidence of acute organ damage.  These vital sign, lab and physical exam findings constitute a diagnosis of SEPSIS.         Anti-infectives (From now, onward)      Start     Dose/Rate Route Frequency Ordered Stop    10/27/23 1800  sulfamethoxazole-trimethoprim (BACTRIM) 400-80 MG per tablet 1 tablet        Note to Pharmacy: PTA Sig:Take 1 tablet by mouth Every Mon, Wed, Fri Morning      1 tablet Oral Once per day on Monday Wednesday Friday 10/25/23 1715      10/27/23 1800  valGANciclovir (VALCYTE) tablet 450 mg        Note to Pharmacy: PTA Sig:Take 1 tablet (450 mg) by mouth three times a week Take on dialysis days AFTER dialysis      450 mg Oral Once per day on Monday Wednesday Friday 10/25/23 1715      10/26/23 0800  posaconazole (NOXAFIL) EC tablet TBEC 300 mg        Note to Pharmacy: PTA Sig:Take 3 tablets (300 mg) by mouth daily      300 mg Oral DAILY 10/25/23 1715      10/26/23 0400  minocycline (MINOCIN) 200 mg in sodium chloride 0.9 % 100 mL intermittent infusion         200 mg  100 mL/hr over 60 Minutes Intravenous EVERY 12 HOURS 10/25/23 1901      10/25/23 2100  piperacillin-tazobactam (ZOSYN) 2.25 g vial to attach to  ml bag        Note to Pharmacy: For [facility] and [facility]: For Zosyn-naive patients,  "use the \"Zosyn initial dose + extended infusion\" order panel.    2.25 g  over 30 Minutes Intravenous EVERY 6 HOURS 10/25/23 1740      10/25/23 2000  azithromycin (ZITHROMAX) tablet 250 mg        Note to Pharmacy: PTA Sig:Take 1 tablet (250 mg) by mouth daily      250 mg Oral DAILY 10/25/23 1715      10/25/23 1448  vancomycin place jordan - receiving intermittent dosing         1 each Intravenous SEE ADMIN INSTRUCTIONS 10/25/23 1448            Current antibiotic coverage is appropriate for source of infection.     "

## 2023-10-26 NOTE — PROGRESS NOTES
HEMODIALYSIS TREATMENT NOTE    Date: 10/26/2023  Time: 3:57 PM    Data:  Weight change:  0kg  Ultrafiltration - Post Run Net Total Removed (mL):  0mL  Vascular Access Status: patent  Dialyzer Rinse:  streaked   Total Blood Volume Processed: 75.7 L Liters  Total Dialysis (Treatment) Time:   3.5Hours  K2Ca3  [id number]   AVF used   15g x 2 successful     Lab:   Yes    Interventions:  Albumin prime. Pt tolerated tx of 3.5Hr and NO UF removal. B/t+. Hemostasis obtained within 10 minutes.     Assessment:  A&Ox3. HR-RRR- a. Fib- asymptomatic, 20 prm. Lung sounds diminished ant & post BUL/BLL. No complaints of pain. Pt denies complaints since last tx. B/t+. Hemostasis obtained within 10 minutes      Plan:    Per renal

## 2023-10-26 NOTE — CONSULTS
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  The patient was admitted on 10/25/2023 with malaise, cough, dyspnea and CT chest 10/24 concerning for uncontrolled infection.    Acute hypoxic respiratory failure:  Concern for pneumonia:  Sepsis:  Progressive malaise, productive cough (clear sputum), and dyspnea for 2 months with occasional wheezing. S/p bronch  BAL with Steno s/p Levaquin and Bactrim without improvement. CT chest 10/11 ([facility] overread) with increased GGO and nodules t/o RML and KONRAD.  Newly on home O2 2L x 2 weeks. S/p OR bronch on 10/17 with BAL and RM stenosis dilation (as below). Felt well for 24 hours and symptoms have returned.  Bronch culture with Actinomyces and started on Augmentin day prior to admission. CT chest (10/24) with worsening consolidation bilaterally concerning for worsening infection, new prominent left upper lobe anterior pleural-based soft tissue thickening, mild air trapping.  Low grade temp (10/25), mild leukocytosis, and procal 0.79 indicating moderate risk of systemic infection. DDx includes infection, PE (tachycardia and hypotension, US of extremities negative DVT), worsening CLAD, rejection (DSA ordered as below, cannot rule out ACR), cardiac, hypervolemia (missed dialysis 10/25 and CXR 10/25 with pulmonary edema).  - Sputum  bacterial culture 10/25 pending  - Sputum fungal, AFB, nocardia cultures ordered, still needs collection  - Blood cultures (10/25) NGTD  - Respiratory panel ordered  - Encourage airway clearance with IS and aerobika and Nebs: Xopenex/Mucomyst (10/26) TID  - ABX: Zosyn (10/25) per txpt ID, Minocycline and Vancomycin 10/26-10/26 stopped by txpt ID, low threshold to resume with decompensation, if Vanco resumed premedicate with antihistamine and slow infusion time given vancomycin-infusion erythrodermic pruritic reaction on 10/25  - Recommend echo to evaluate for cardiac etiology and evaluate for RV strain (?PE)  - Supplemental O2 to maintain SpO2>92%, wean as able  - IgG ordered  - Repeat CXR 10/27 ordered for interval follow up    S/p bilateral sequential lung transplant (BSLT) for ILD:  Clinic visit with Ame Chow 10/24 with increased cough and dyspena, occasional wheezing, as above. FTs decreased. Recent immuknow 479 on 9/7/23, and DSA negative 8/8.  - DSA and Immunknow ordered     Immunosuppression:  - Tacrolimus 1 mg qAM/ 0.5 mg qPM (pt. missed 10/25 AM dose).  Goal level 8-10.  Level 10/28 (ordered).  - AZA 50 mg daily (decreased 10/26 from 75 mg daily given concern for infection)  - Prednisone 5 mg qAM / 2.5 mg qPM    Prophylaxis:   - Bactrim MWF for PJP     CLAD: PTA azithromycin, Singulair, and [facility]    Right main bronchial stenosis s/p recurrent dilatation: Follow with IP for serial bronchoscopies with dilation. Last bronch 10/17/23 with dilation  - Airway clearance as above,  albuterol and Pulmicort BID PRN     Left-sided Aspergillus empyema:  Noted in 2019 s/p needle aspiration with Aspergillus fumigatus on cultures s/p intrapleural amphotericin bead placement. Posaconazole indefinitely per ID.    - BDG fungitell and galactomannan ordered  - PTA Posaconazole, repeat level ordered 10/27    Recurrent CMV viremia: Positive 10/4 at 2450 and peaked at 2,680 on 10/11, down to 80  on 10/24.   - PTA VGCV induction dosing (renally dosed) MWF after dialysis  - CMV weekly (due 10/31)     EBV viremia:  Most recent EBV stable at 2,680/log 4.7 (10/11).   - Repeat EBV as OP     ESRD on iHD (since 10/2019): On MWF iHD.  Renal transplant evaluation on hold currently for infection and worsening PFTs of unknown etiology  Missed dialysis run on 10/25.  - Management per nephrology    We appreciate the excellent care provided by the [place] [id number] team.  Recommendations communicated via phone and this note.  Will continue to follow along closely, please do not hesitate to call with any questions or concerns.    Patient discussed with Dr. Yeyo Ibarra, APRN, CNP   Inpatient Nurse Practitioner  Pulmonary CF/Transplant     Chief Complaint:     Progressive malaise, cough, and dyspnea for 2 months with occasional wheezing and acute hypoxia    History of Present Illness:     History obtained from patient and chart review.    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      The patient was admitted on 10/25/2023 with progressive malaise, productive cough (clear sputum), and dyspnea for 2 months with occasional wheezing. S/p bronch 8/17 BAL with Steno s/p Levaquin and Bactrim without improvement. CT chest 10/11 ([facility] overread) with increased GGO and nodules t/o RML and KONRAD.  Newly on home O2 2L x 2 weeks. S/p OR bronch on 10/17 with BAL and RM stenosis dilation (as below). Felt well for 24 hours and symptoms have returned.  Bronch culture with Actinomyces and started on Augmentin day prior to admission. Low grade temp (10/25), mild leukocytosis, and procal 0.79 indicating moderate risk of  systemic infection. Denies fever at home, or chills, aches, sweats.  No HA, sore throat, or nasal drainage. Denies reflux or abdominal pain.  Stools normal, although loose last night after starting ABX. No nausea or vomiting.  Appetite at baseline, no weight loss. Pruritus rash started after receiving vanco yesterday.  Improving today with benadryl. Oliguric on HD. No wounds or swelling.     Review of Systems:     Complete ROS negative except as noted in HPI.    Medical and Surgical History:     Past Medical History:   Diagnosis Date    Acute on chronic respiratory failure with hypoxia (H) 02/21/2018    Anisocoria     Antisynthetase syndrome (H24) 2014    Anxiety     Aspergillus (H)     Aspergillus pneumonia (H) 11/20/2020    Benign essential hypertension     C. difficile colitis     Cytomegalovirus (CMV) viremia (H)     Dermatomyositis (H)     Dysplasia of cervix, low grade (ESTRADA 1)     EBV (Franklin-Barr virus) viremia     ESRD (end stage renal disease) on dialysis (H)     ILD (interstitial lung disease) (H)     Lung replaced by transplant (H)     Osteopenia     Paroxysmal atrial fibrillation (H)     Pneumocystis jiroveci pneumonia (H)     PONV (postoperative nausea and vomiting)     Post-menopause     Pulmonary hypertension (H)     Raynaud's disease     Seronegative rheumatoid arthritis (H)      Past Surgical History:   Procedure Laterality Date    BRONCHOSCOPY (RIGID OR FLEXIBLE), DIAGNOSTIC N/A 4/10/2018    Procedure: COMBINED BRONCHOSCOPY (RIGID OR FLEXIBLE), LAVAGE;;  Surgeon: Mariposa Donohue MD;  Location:  GI    BRONCHOSCOPY (RIGID OR FLEXIBLE), DIAGNOSTIC N/A 12/23/2020    Procedure: BRONCHOSCOPY, WITH BRONCHOALVEOLAR LAVAGE;  Surgeon: Uri Bass MD;  Location:  GI    BRONCHOSCOPY (RIGID OR FLEXIBLE), DIAGNOSTIC N/A 5/26/2022    Procedure: BRONCHOSCOPY, WITH BRONCHOALVEOLAR LAVAGE;  Surgeon: Uri Bass MD;  Location:  GI    BRONCHOSCOPY (RIGID OR FLEXIBLE), DIAGNOSTIC N/A 8/17/2023     Procedure: BRONCHOSCOPY, WITH BRONCHOALVEOLAR LAVAGE;  Surgeon: Esau Bruno MD;  [facility]    BRONCHOSCOPY (RIGID OR FLEXIBLE), DILATE BRONCHUS / TRACHEA N/A 10/11/2018    Procedure: BRONCHOSCOPY (RIGID OR FLEXIBLE), DILATE BRONCHUS / TRACHEA;  Flexible And Rigid Bronchoscopy and Dilation;  Surgeon: Wilber Lin MD;  [facility]    BRONCHOSCOPY FLEXIBLE N/A 3/13/2018    Procedure: BRONCHOSCOPY FLEXIBLE;  Flexible Bronchoscopy ;  Surgeon: Gissell Sanchez MD;  [facility]    BRONCHOSCOPY FLEXIBLE N/A 5/9/2018    Procedure: BRONCHOSCOPY FLEXIBLE;;  Surgeon: Wilber Lin MD;  [facility]    BRONCHOSCOPY FLEXIBLE AND RIGID N/A 9/10/2018    Procedure: BRONCHOSCOPY FLEXIBLE AND RIGID;  Flexible and Rigid Bronchoscopy with Balloon Dilation, tissue debulking with cryo, and Right mainstem bronchus stent placement;  Surgeon: Wilber Lin MD;  [facility]    BRONCHOSCOPY RIGID N/A 6/6/2018    Procedure: BRONCHOSCOPY RIGID;;  Surgeon: Lopez Macias MD;  [facility]    BRONCHOSCOPY, DILATE BRONCHUS, STENT BRONCHUS, COMBINED N/A 6/11/2018    Procedure: COMBINED BRONCHOSCOPY, DILATE BRONCHUS, STENT BRONCHUS;  Flexible Bronchoscopy, Balloon Dilation, Bronchial Washings;  Surgeon: Wilber Lin MD;  [facility]    BRONCHOSCOPY, DILATE BRONCHUS, STENT BRONCHUS, COMBINED Right 7/10/2018    Procedure: COMBINED BRONCHOSCOPY, DILATE BRONCHUS, STENT BRONCHUS;  Flexible Bronchoscopy, Balloon Dilation, Bronchial Washings  ;  Surgeon: Wilber Lin MD;  [facility]    BRONCHOSCOPY, DILATE BRONCHUS, STENT BRONCHUS, COMBINED N/A 8/2/2018    Procedure: COMBINED BRONCHOSCOPY, DILATE BRONCHUS, STENT BRONCHUS;  Flexible Bronchoscopy, Bronchial Washings, Balloon Dilation;  Surgeon: Wilber Lin MD;  [facility]    BRONCHOSCOPY, DILATE BRONCHUS, STENT BRONCHUS, COMBINED N/A 8/20/2018    Procedure: COMBINED BRONCHOSCOPY, DILATE  BRONCHUS, STENT BRONCHUS;  Flexible Bronchoscopy, Balloon Dilation;  Surgeon: Wilber Lin MD;  [facility]    BRONCHOSCOPY, DILATE BRONCHUS, STENT BRONCHUS, COMBINED N/A 10/29/2018    Procedure: Flexible Bronchoscopy, Balloon Dilation, Stent Revision, Airway Examination And Therapeutic Suctioning, Cyro Tumor Debulking;  Surgeon: Wilber Lin MD;  [facility]    BRONCHOSCOPY, DILATE BRONCHUS, STENT BRONCHUS, COMBINED N/A 11/20/2018    Procedure: Rigid Bronchoscopy, Stent Removal and dilitation;  Surgeon: Wilber Lin MD;  [facility]    BRONCHOSCOPY, DILATE BRONCHUS, STENT BRONCHUS, COMBINED N/A 12/14/2018    Procedure: Flexible And Rigid Bronchoscopy, Balloon Dilation, Bronchial Washing;  Surgeon: Wilber Lin MD;  [facility]    BRONCHOSCOPY, DILATE BRONCHUS, STENT BRONCHUS, COMBINED N/A 1/17/2019    Procedure: Flexible And Rigid Bronchoscopy, Balloon Dilation.;  Surgeon: Wilber Lin MD;  [facility]    BRONCHOSCOPY, DILATE BRONCHUS, STENT BRONCHUS, COMBINED N/A 3/7/2019    Procedure: Flexible and Rigid Bronchoscopy, Bronchial Washing, Balloon Dilation;  Surgeon: Wilber Lin MD;  [facility]    BRONCHOSCOPY, DILATE BRONCHUS, STENT BRONCHUS, COMBINED N/A 6/6/2019    Procedure: Rigid and Flexible Bronchoscopy, Balloon Dilation;  Surgeon: Wilber Lin MD;  [facility]    BRONCHOSCOPY, DILATE BRONCHUS, STENT BRONCHUS, COMBINED N/A 10/11/2019    Procedure: Flexible and Rigid Bronchoscopy, Balloon Dilation, Bronchoalveolar Lagave;  Surgeon: Wilber Lin MD;  [facility]    BRONCHOSCOPY, DILATE BRONCHUS, STENT BRONCHUS, COMBINED N/A 2/19/2021    Procedure: BRONCHOSCOPY, flexible, airway dilation, and bronchial wash;  Surgeon: Wilber Lin MD;  [facility]    BRONCHOSCOPY, DILATE BRONCHUS, STENT BRONCHUS, COMBINED N/A 4/9/2021    Procedure: BRONCHOSCOPY, flexible and rigid, Airway suctioning;   Surgeon: Mati Norris MD;  [facility]    BRONCHOSCOPY, DILATE BRONCHUS, STENT BRONCHUS, COMBINED N/A 10/17/2023    Procedure: RIGID, flexible bronchoscopy with airway dilation;  Surgeon: Preet Patel MD;  [facility]    CV RIGHT HEART CATH MEASUREMENTS RECORDED N/A 3/10/2020    Procedure: CV RIGHT HEART CATH;  Surgeon: Wai Garcia MD;  [facility] C[facility]    ENT SURGERY      tonsillectomy as a child    ESOPHAGOSCOPY, GASTROSCOPY, DUODENOSCOPY (EGD), COMBINED N/A 10/29/2018    Procedure: COMBINED ESOPHAGOSCOPY, GASTROSCOPY, DUODENOSCOPY (EGD) with biopsies and polypectomy;  Surgeon: Chente Bloom MD;  [facility]    INSERT EXTRACORPORAL MEMBRANE OXYGENATOR ADULT (OUTSIDE OR) N/A 2/27/2018    Procedure: INSERT EXTRACORPORAL MEMBRANE OXYGENATOR ADULT (OUTSIDE OR);  INSERT EXTRACORPORAL MEMBRANE OXYGENATOR ADULT (OUTSIDE OR) ;  Surgeon: Toby Hernandez MD;  [facility]    IR CVC TUNNEL PLACEMENT > 5 YRS OF AGE  10/25/2019    IR DIALYSIS FISTULOGRAM LEFT  3/2/2021    IR DIALYSIS MECH THROMB, PTA  3/2/2021    IR FLUORO 0-1 HOUR  5/7/2021    IR GASTRO JEJUNOSTOMY TUBE PLACEMENT  2/16/2021    IR PARACENTESIS  1/8/2020    IR THORACENTESIS  9/13/2019    IR TRANSCATHETER BIOPSY  1/8/2020    LASER CO2 BRONCHOSCOPY N/A 4/30/2021    Procedure: Flexible and Rigid Bronchoscopy and Tissue Debulking with CO2 Laser Assistance;  Surgeon: Mati Norris MD;  [facility]    LASER CO2 BRONCHOSCOPY N/A 6/11/2021    Procedure: BRONCHOSCOPY, Flexible and Rigid Bronchoscopy, Tissue Debulking with cryo Assistance, airway dilation,;  Surgeon: Mati Norris MD;  [facility]    LASER CO2 BRONCHOSCOPY N/A 9/16/2021    Procedure: BRONCHOSCOPY, flexible and rigid, CO2 Laser Debulking;  Surgeon: Mati Norris MD;  [facility]    LASER CO2 BRONCHOSCOPY N/A 2/11/2022    Procedure: flexible, rigid bronchoscopy, tissue debulking, airway dilation, co2 laser,  bronchoalveolar lavage;  Surgeon: Juana Baugh MD;  [facility]    no prior surgery      REMOVE EXTRACORPORAL MEMBRANE OXYGENATOR ADULT N/A 3/3/2018    Procedure: REMOVE EXTRACORPORAL MEMBRANE OXYGENATOR ADULT;  Removal of Right Femoral Venous and Right Axillary Arterial Extracorporeal Membrane Oxygenator;  Surgeon: Toby Hernandez MD;  [facility]    TRANSPLANT LUNG RECIPIENT SINGLE X2 Bilateral 3/1/2018    Procedure: TRANSPLANT LUNG RECIPIENT SINGLE X2;  Median Sternotomy, Extracorporeal Membrane Oxygenator, bilateral sequential lung transplant;  Surgeon: Toby Hernandez MD;  [facility]     Social and Family History:     Social History     Socioeconomic History    Marital status:      Spouse name: Not on file    Number of children: Not on file    Years of education: Not on file    Highest education level: Not on file   Occupational History    Not on file   Tobacco Use    Smoking status: Never    Smokeless tobacco: Never   Substance and Sexual Activity    Alcohol use: No     Alcohol/week: 1.0 standard drink of alcohol     Types: 1 Glasses of wine per week    Drug use: No    Sexual activity: Not on file   Other Topics Concern    Parent/sibling w/ CABG, MI or angioplasty before 65F 55M? No   Social History Narrative    3/6/2019 - Lives with . Has three daughters. Four grandchildren (two ). No pets. Travelled previously to [place], [place]. Has visited [place] several times.      Social Determinants of Health     Financial Resource Strain: Not on file   Food Insecurity: Not on file   Transportation Needs: Not on file   Physical Activity: Not on file   Stress: Not on file   Social Connections: Not on file   Interpersonal Safety: Not on file   Housing Stability: Not on file     Family History   Problem Relation Age of Onset    Hypertension Mother     Arthritis Mother     Pancreatic Cancer Father     Diabetes Father      Allergies and Home Medications:      Allergies   Allergen Reactions    Vancomycin Other (See Comments)     Diffuse erythroderma with itching (improved with Benadryl) after receiving vancomycin over 1 hour. Highly suspicious for vancomycin-infusion syndrome. Can use vancomycin in the future, but please give over slower infusion time (at least 2 hours) and premedicate with Benadryl.     (Not in a hospital admission)    Current Scheduled Meds   [START ON 10/27/2023] azaTHIOprine  50 mg Oral QPM    azithromycin  250 mg Oral Daily    calcium acetate  667 mg Oral TID w/meals    fluticasone-vilanterol  1 puff Inhalation Daily    heparin ANTICOAGULANT  5,000 Units Subcutaneous Q12H    [Held by provider] metoprolol tartrate  25 mg Oral or Feeding Tube BID    montelukast  10 mg Oral At Bedtime    multivitamin RENAL  1 tablet Oral Daily    pantoprazole  40 mg Oral QAM AC    piperacillin-tazobactam  2.25 g Intravenous Q6H    posaconazole  300 mg Oral Daily    predniSONE  5 mg Oral QAM    [START ON 10/27/2023] sulfamethoxazole-trimethoprim  1 tablet Oral Once per day on Monday Wednesday Friday    tacrolimus  0.5 mg Oral QAM    tacrolimus  1 mg Oral QPM    valGANciclovir  450 mg Oral Once    [START ON 10/27/2023] valGANciclovir  450 mg Oral Once per day on Monday Wednesday Friday    Vitamin D3  50 mcg Oral Daily      Current PRN Meds  acetaminophen, acetylcysteine, albumin human, albuterol, benzonatate, budesonide, guaiFENesin, hydrOXYzine, melatonin, midodrine, ondansetron **OR** ondansetron, prochlorperazine **OR** prochlorperazine **OR** prochlorperazine, sodium chloride 0.9%, - MEDICATION INSTRUCTIONS -     Physical Exam:     All notes, images, and labs from past 24 hours (at minimum) were reviewed.    Vital signs:  Temp: 98.1  F (36.7  C) Temp src: Oral BP: 113/72 Pulse: 118   Resp: 25 SpO2: 93 % O2 Device: Nasal cannula Oxygen Delivery: 2 LPM      I/O: No intake or output data in the 24 hours ending 10/26/23 1601    Constitutional: lying in ED cot, in no  "apparent distress.   HEENT: Eyes with pink conjunctivae, anicteric.  Oral mucosa dry without lesions.  Neck supple without lymphadenopathy.   PULM: Diminished air flow bilaterally. +scattered crackles, no rhonchi, no wheezes.  Non-labored breathing on 2LNC.  CV: Normal S1 and S2.  +tachycardia.  No murmur, gallop, or rub.  No peripheral edema.   ABD: NABS, soft, nontender, nondistended.    MSK: Moves all extremities.  No apparent muscle wasting.   NEURO: Alert and oriented, conversant.   SKIN: Warm, dry.  No rash on limited exam.   PSYCH: Mood stable.      Results:     LABS    CMP:   Recent Labs   Lab 10/26/23  0606 10/26/23  0054 10/25/23  1356 10/24/23  1033   * 132* 133* 138   POTASSIUM 4.5 4.1 4.4 3.7   CHLORIDE 94* 94* 96* 96*   CO2 24 25 26 31*   ANIONGAP 15 13 11 11   GLC 75 81 92 120*   BUN 31.7* 29.5* 24.6* 14.6   CR 5.61* 5.32* 4.90* 3.43*   GFRESTIMATED 8* 9* 10* 15*   CHELSEY 8.4* 8.0* 8.3* 9.0   MAG  --   --   --  1.9   PHOS 3.9  --   --   --    PROTTOTAL 6.3* 5.5* 6.0* 7.4   ALBUMIN 3.2* 2.9* 3.0* 3.6   BILITOTAL 1.5* 1.5* 1.0 0.9   ALKPHOS 186* 168* 232* 306*   AST 26 24 34 39   ALT 24 22 25 31     CBC:   Recent Labs   Lab 10/26/23  0606 10/26/23  0054 10/25/23  1356 10/24/23  1033   WBC 8.5 9.9 11.6* 3.9*   RBC 3.19* 2.66* 3.07* 3.30*   HGB 10.5* 8.6* 10.1* 10.9*   HCT 33.3* 27.1* 30.8* 33.4*   * 102* 100 101*   MCH 32.9 32.3 32.9 33.0   MCHC 31.5 31.7 32.8 32.6   RDW 17.3* 17.1* 16.7* 16.0*   * 131* 142* 165       INR:   Recent Labs   Lab 10/24/23  1033   INR 0.99       Glucose:   Recent Labs   Lab 10/26/23  0606 10/26/23  0054 10/25/23  1356 10/24/23  1033   GLC 75 81 92 120*       Blood Gas: No lab results found in last 7 days.    Culture Data No results for input(s): \"CULT\" in the last 168 hours.    Virology Data:   Lab Results   Component Value Date    FLUAH1 Not Detected 08/17/2023    FLUAH3 Not Detected 08/17/2023    WE2826 Not Detected 08/17/2023    IFLUB Not Detected " 08/17/2023    RSVA Not Detected 08/17/2023    RSVB Not Detected 08/17/2023    PIV1 Not Detected 08/17/2023    PIV2 Not Detected 08/17/2023    PIV3 Not Detected 08/17/2023    HMPV Not Detected 08/17/2023    HRVS Negative 01/24/2021    ADVBE Negative 01/24/2021    ADVC Negative 01/24/2021    ADVC Negative 12/23/2020    ADVC Negative 10/07/2019       Historical CMV results (last 3 of prior testing):  Lab Results   Component Value Date    CMVQNT Not Detected 07/11/2023    CMVQNT Not Detected 04/06/2023    CMVQNT <137 (A) 02/09/2023     Lab Results   Component Value Date    CMVLOG 2.0 10/24/2023    CMVLOG 1.9 10/24/2023    CMVLOG 1.9 10/17/2023       Urine Studies    Recent Labs   Lab Test 01/24/21  1729 10/21/19  2240   URINEPH 5.0 5.0   NITRITE Negative Negative   LEUKEST Moderate* Large*   WBCU 34* 115*       Most Recent [place] Pulmonary Function Testing (FVC/FEV1 only)  FVC-Pre   Date Value Ref Range Status   10/24/2023 0.96 L    09/07/2023 1.25 L    08/08/2023 1.35 L    07/11/2023 1.47 L      FVC-%Pred-Pre   Date Value Ref Range Status   10/24/2023 33 %    09/07/2023 42 %    08/08/2023 46 %    07/11/2023 50 %      FEV1-Pre   Date Value Ref Range Status   10/24/2023 0.87 L    09/07/2023 1.07 L    08/08/2023 1.12 L    07/11/2023 1.16 L      FEV1-%Pred-Pre   Date Value Ref Range Status   10/24/2023 37 %    09/07/2023 46 %    08/08/2023 48 %    07/11/2023 49 %        IMAGING    Recent Results (from the past 48 hour(s))   XR Chest 2 Views    Narrative    XR CHEST 2 VIEWS  10/25/2023 1:10 PM     HISTORY:  cough, B lung transplant       COMPARISON:  CT chest 10/24/2023, and chest radiograph 9/7/2023    TECHNIQUE: XR CHEST 2 VIEWS    FINDINGS:   Unchanged postsurgical changes related to bilateral lung transplant  including midline sternotomy wires, and right axillary and mediastinal  surgical clips.  Low lung volumes with scattered basilar predominant linear  interstitial opacities, particularly at the right lung base.  Bilateral  perihilar airspace opacification.  Small bilateral pleural effusions. No pneumothorax. Cardiac silhouette  is normal.        Impression    IMPRESSION:  Low lung volumes with findings reflective of moderate pulmonary edema.  Cannot rule out superimposed infectious process    I have personally reviewed the examination and initial interpretation  and I agree with the findings.    TERRI ISSA MD         SYSTEM ID:  [id number]   US Upper Extremity Venous Duplex Left    Narrative    EXAMINATION: DOPPLER VENOUS ULTRASOUND OF THE LEFT UPPER EXTREMITY,  10/25/2023 3:21 PM     COMPARISON: None.    HISTORY: Marked left upper extremity swelling. Dialysis fistula is  present in the left upper extremity.    TECHNIQUE:  Gray-scale evaluation with compression, spectral flow and  color Doppler assessment of the deep venous system of the left upper  extremity.    FINDINGS:    Left: Normal blood flow and waveforms are demonstrated in the internal  jugular, innominate, subclavian, and axillary veins. There is normal  compressibility of the brachial, basilic and cephalic veins.      Impression    IMPRESSION:  No left upper extremity deep venous thrombosis  demonstrated.    I have personally reviewed the examination and initial interpretation  and I agree with the findings.    EDIE VILLA MD         SYSTEM ID:  [id number]   US Lower Extremity Venous Duplex Bilateral    Narrative    EXAMINATION: DOPPLER VENOUS ULTRASOUND OF BILATERAL LOWER EXTREMITIES,  10/26/2023 2:09 AM     COMPARISON: Lower extremity venous duplex ultrasound 10/6/2019    HISTORY: Rule out PE in ESRD patient with hypotension, tachycardia,  not responding to fluids    TECHNIQUE:  Gray-scale evaluation with compression, spectral flow and  color Doppler assessment of the deep venous system of both legs from  groin to knee, and then at the ankles.    FINDINGS:  In both lower extremities, the common femoral, femoral, popliteal and  posterior tibial veins  demonstrate normal compressibility and blood  flow.      Impression    IMPRESSION:  No evidence of deep venous thrombosis in either lower extremity.    I have personally reviewed the examination and initial interpretation  and I agree with the findings.    REJI VIVAS DO         SYSTEM ID:  [id number]

## 2023-10-27 NOTE — PROGRESS NOTES
[facility]  Transplant Infectious Disease Progress Note     Patient:  Kecia Blue, Date of birth 1960, Medical record number [id number]  Date of Visit:  10/27/2023         Assessment and Recommendations:   Recommendations:  1. Continue pip-tazo. Anticipate 2 week course, probably can de-escalate to amox-clav once she improves for discharge.  2. Following non-invasive fungal work-up and BAL cultures.   3. Continue posaconazole, TMP-SMX, and azithromycin for prophylaxis.   4. Increase valganciclovir to 450 mg po daily for 3 weeks, or until VL negative for 2 checks, whichever is longer. Can switch back to three times weekly dosing after that. Give after HD on HD days.  5. 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 Allergy list updated. Though I agree with dermatology that the persistence of the erythema is not typical. Appreciate their recommendations.   6. She would benefit from post-dilation courses of amox-clav for ~2 weeks in the future (or alternatively, 1 week out of each month).     Discussed with Dr. Meier, ID staff. Transplant Infectious Disease will continue to follow with you.    Dr Trino Mathews pager [phone] is covering transplant ID and available over the weekend if questions or concerns arise. I will be back on service Monday along with Dr. Lewis.     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 Would not consider this pulmonary actinomyces at this time, but rather use that as a surrogate of significant oral contamination that has caused her slowly progressive symptoms. Zosyn is an excellent drug for this syndrome and she already has had signficant improvement since starting Zosyn on 10/25. MRSA nare negative - would stop vancomycin and minocycline as well (steno has not grown again, and no symptom improvement with extended tx of this organism).     Viral cause seems unlikely to be going on for 2 months. CMV viremia is responding well to treatment plus CMV BAL is negative. Fungal cause is possible - especially aspergillus given her history of it, though BAL cultures and galactomannan have not suggested this organism. CMV viremia is a major risk factor for IFI.  PJP possible, but PCR negative. Nocardia cultures negative multiple times. AFB negative. Can round out fungal work-up with CrAg and histoplasma urinary Ag. Will follow serum galactomannan, though her BAL galactomannan is negative. If positive, may suggest extra-pulmonary source (eg. Pleura/effusion specifically of note the left upper pleural thickening seen on most recent CT chest). She can continue her prior posaconazole while work-up proceeds. Possibly some of these findings/symptoms could be related to dermatomyositis/RA-related ILD as well.    #Erythrodermic skin rash, diffuse, pruritus, after receiving vancomycin + pip-tazo  Responded well to diphenhydramine, so it seemed consistent with vanco-infusion syndrome, but given her history of autoimmune disease  (dermatomyositis), and the fact the rash persisted for quite awhile, dermatology is involved who feel it may resemble recurrence of autoimmune disease and are considering skin biopsy. Updated allergy list.    #CMV viremia  She is on VGCV with good treatment response, though would increase dose to daily given persistently low-level viremia and unclear evidence for dosing in HD patients.    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 BAL showed septate hyphae. 7/2020 had hypodense mass in left lung with biopsy showing fungal hyphae, cx aspergillus. Started on voriconazole in 2019, changed to posaconazole in 2020, she remains on 300 mg daily.  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 Recovery from this required tracheostomy.  2019 - She had also grown Actinomyces from multiple BAL    - QTc interval: 420 msec (10/25/2023)   - Bacterial prophylaxis: Azithromycin  - Pneumocystis prophylaxis: TMP-SMX for life (hx PJP)  - Serostatus & viral prophylaxis: CMV D+/R+, EBV D+/R+  - Fungal prophylaxis: posaconazole   - Immunization status: Seasonal influenza, RSV, and COVID vaccine  - Gamma globulin status: 979 10/27/2023  - Isolation status: Hx MRSA - contact         Summary of presentation:   59F with history of ILD, seronative RA, dermatomyositis s/p bilateral lung transplant 3/2018 with post transplant course c/b left aspergilus empyema (2019, on posaconazole), CMV viremia, ESRD on HD, and 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 Still had a 2 hour coughing spell this AM that required her to increase her O2 which helped symptoms. No fevers, chills, and not really short of breath. Still has an asymptomatic diffuse skin rash.      Transplants:  3/1/2018 (Lung), Postoperative day:  2066.  Coordinator Kacy Martínez           Current Medications & Allergies:       acetylcysteine  2 mL Nebulization TID     azaTHIOprine  50 mg Oral QPM     [Held by provider] azithromycin  250 mg Oral Daily     [Held by provider] calcium acetate  667 mg Oral TID w/meals     fluticasone-vilanterol  1 puff Inhalation Daily     heparin ANTICOAGULANT  5,000 Units Subcutaneous Q12H     hydrocortisone   Topical BID     levalbuterol  1.25 mg Nebulization 3 times daily     [Held by provider] metoprolol tartrate  25 mg Oral or Feeding Tube BID     midodrine  10 mg Oral TID w/meals     montelukast  10 mg Oral At Bedtime     multivitamin RENAL  1 tablet Oral Daily     pantoprazole  40 mg Oral QAM AC     piperacillin-tazobactam  2.25 g Intravenous Q6H     posaconazole  300 mg Oral Daily     predniSONE  5 mg Oral QAM     sulfamethoxazole-trimethoprim  1 tablet Oral Once per day on Monday Wednesday Friday     tacrolimus  0.5 mg Oral QAM     tacrolimus  1 mg Oral QPM     triamcinolone   Topical BID     valGANciclovir  450 mg Oral Daily     Vitamin D3  50 mcg Oral Daily            Physical Exam:     Patient Vitals for the past 24 hrs:   BP Temp Temp src Pulse Resp SpO2   10/27/23 1443 97/56 97.6  F (36.4  C) Oral (!) 121 22 97 %   10/27/23 1358 -- -- -- -- -- 97 %   10/27/23 0531 91/51 98.7  F (37.1  C) Oral 90 20 --   10/27/23 0257 92/52 -- -- 113 -- 99 %   10/27/23 0103 95/49 98.9  F (37.2  C) Oral 120 25 --   10/26/23 2327 92/50 98.9  F (37.2  C) Oral (!) 125 24 95 %   10/26/23 2321 (!) 83/42 -- -- -- -- --   10/26/23 2301 90/48 -- -- -- -- --   10/26/23 2236 (!) 83/37 -- -- 117 -- 96 %   10/26/23 2052 (!) 79/42 -- -- (!) 124 -- 92 % " "  10/26/23 2048 (!) 80/45 -- -- (!) 130 -- --   10/26/23 2042 (!) 82/45 -- -- (!) 127 24 --   10/26/23 2025 (!) 88/41 98.8  F (37.1  C) Oral (!) 136 20 94 %   10/26/23 1730 -- -- -- 120 -- 100 %   10/26/23 1729 -- -- -- 114 -- 100 %   10/26/23 1657 -- 98.6  F (37  C) -- -- -- --   10/26/23 1655 -- -- -- -- -- 96 %   10/26/23 1652 95/58 -- -- -- -- 99 %   10/26/23 1605 120/75 -- -- (!) 124 13 92 %   10/26/23 1600 (!) 111/97 -- -- (!) 124 21 94 %     Ranges for vital signs:  Temp:  [97.6  F (36.4  C)-98.9  F (37.2  C)] 97.6  F (36.4  C)  Pulse:  [] 121  Resp:  [13-25] 22  BP: ()/(37-97) 97/56  SpO2:  [92 %-100 %] 97 %  There were no vitals filed for this visit.    Physical Examination:  GENERAL:  in bed in no acute distress; O2 in place.  HEAD:  Head is normocephalic, atraumatic   EYES:  Eyes have anicteric sclerae without conjunctival injection   ENT:  Oropharynx is moist without exudates or ulcers. Tongue is midline. Fair dentition.  NECK:  Supple. No cervical lymphadenopathy  LUNGS:  Left lower lobe crackles. Scattered rhonchi throughout.   CARDIOVASCULAR:  Regular rate and rhythm with no murmurs, gallops or rubs.  ABDOMEN:  Normal bowel sounds, soft, nontender. No appreciable hepatosplenomegaly.  SKIN:  Diffuse warm erythematous rash.  Line in place without any surrounding erythema or exudate.  NEUROLOGIC:  Grossly nonfocal. Active x4 extremities         Laboratory Data:     No results found for: \"ACD4\", \"HIVPCR\"    Inflammatory Markers    Recent Labs   Lab Test 10/27/19  1147 10/07/19  1221 10/06/19  1444 09/13/19  0934 09/11/19  2325 08/22/19  0820 05/16/18  1006   SED  --  93*  --  111* 102*  --   --    CRP  --   --  25.0* 58.0* 66.0*   < >  --    G6PD 19.0*  --   --   --   --   --   --    F8VEOGA  --   --   --   --   --   --  113    < > = values in this interval not displayed.       Immune Globulin Studies     Recent Labs   Lab Test 10/27/23  0701 10/24/23  1033 09/15/21  0915 01/31/21  0441 " 01/25/21  0406 10/27/19  0621 03/01/18  0356 02/19/18  0759     --  1,198 763  --  936 698 1,790*   IGM  --   --   --   --   --   --   --  502*   IGE  --  <2  --   --  <2  --   --  <2   IGA  --   --   --   --   --   --   --  425*   IGG1  --   --   --   --   --   --   --  1,300*   IGG2  --   --   --   --   --   --   --  131*   IGG3  --   --   --   --   --   --   --  101   IGG4  --   --   --   --   --   --   --  1*       Metabolic Studies       Recent Labs   Lab Test 10/27/23  0701 10/26/23  2132 10/26/23  0606 10/26/23  0054 10/26/23  0054 10/25/23  1356 06/27/22  1013 05/26/22  0750 09/16/21  0749 09/15/21  0915 10/22/19  1314 10/21/19  1752   *  --  133*  --  132* 133*   < > 137   < > 136   < > 133   POTASSIUM 3.5  --  4.5  --  4.1 4.4   < > 3.5   < > 4.8   < > 5.0   CHLORIDE 95*  --  94*  --  94* 96*   < > 96   < > 98   < > 109   CO2 26  --  24  --  25 26   < > 32   < > 26   < > 10*   ANIONGAP 12  --  15  --  13 11   < > 9   < > 12   < > 13   BUN 16.0  --  31.7*  --  29.5* 24.6*   < > 42*   < > 79*   < > 66*   CR 3.00*  --  5.61*  --  5.32* 4.90*   < > 3.98*   < > 5.63*   < > 5.76*   GFRESTIMATED 17*  --  8*  --  9* 10*   < > 12*   < > 8*   < > 8*   *  --  75  --  81 92   < > 110*   < > 152*   < > 138*   A1C  --   --   --   --   --   --   --  5.2  --  5.8*   < >  --    CHELSEY 8.9  --  8.4*  --  8.0* 8.3*   < > 9.5   < > 8.6   < > 8.0*   PHOS 2.3*  --  3.9   < >  --   --   --   --   --   --    < > 6.7*   MAG  --  1.5*  --   --   --   --    < > 1.8   < >  --    < > 2.0   LACT  --  1.2  --   --  1.1 1.1   < >  --    < >  --    < >  --    PCAL  --   --   --   --   --  0.70*  --   --    < >  --    < >  --    FGTL  --   --   --   --   --   --   --   --   --  210   < >  --    CKT 69  --   --   --   --   --   --   --   --   --   --  70    < > = values in this interval not displayed.       Hepatic Studies    Recent Labs   Lab Test 10/27/23  0701 10/26/23  0606 10/25/23  1356 10/24/23  1033 07/11/23  0952  05/26/23  0828 05/27/21  1205 05/01/21  0654 01/27/21  0414 01/26/21  0425 01/24/21  1458 11/19/19  1453 11/11/19  1131   BILITOTAL 1.2 1.5*   < > 0.9   < >  --    < >  --    < > 0.8 1.2   < > 0.4   23115  --   --   --   --   --  0.5   < >  --    < >  --   --    < >  --    DBIL  --   --   --  0.50*   < >  --    < >  --    < > 0.6*  --    < >  --    ALKPHOS 141* 186*   < > 306*   < >  --    < >  --    < > 184* 244*   < > 316*   91718  --   --   --   --   --  259*   < >  --    < >  --   --    < >  --    PROTTOTAL 6.3* 6.3*   < > 7.4   < >  --    < > 7.2   < > 6.0*  6.0* 6.1*   < > 7.2   69886  --   --   --   --   --  7.1   < >  --    < >  --   --    < >  --    ALBUMIN 3.2* 3.2*   < > 3.6   < >  --    < >  --    < > 2.0* 2.0*   < > 3.0*   16183  --   --   --   --   --  3.2*   < >  --    < >  --   --    < >  --    AST 17 26   < > 39   < >  --    < >  --    < > 11 31   < > 30   94349  --   --   --   --   --  31   < >  --    < >  --   --    < >  --    ALT 20 24   < > 31   < >  --    < >  --    < > 30 35   < > 27   77361  --   --   --   --   --  48   < >  --    < >  --   --    < >  --    LDH  --   --   --   --   --   --   --  164  --  187  --   --   --    GGT  --   --   --   --   --   --   --   --   --   --   --   --  510*   SALAS  --   --   --   --   --   --   --   --   --   --  <10*  --   --     < > = values in this interval not displayed.       Pancreatitis testing    Recent Labs   Lab Test 10/25/23  1356 05/26/22  0750 12/31/19  1033 10/24/19  2032 10/21/19  1451 10/06/19  1444 03/04/18  0353 02/19/18  0759   AMYLASE  --   --   --   --   --   --   --  58   LIPASE 24  --   --  212 119 172   < >  --    TRIG  --  155*   < >  --   --   --    < > 115    < > = values in this interval not displayed.       Gout Labs    No lab results found.    Hematology Studies   Recent Labs   Lab Test 10/27/23  0701 10/26/23  0606 10/26/23  0054 10/25/23  1356 10/17/23  1011 10/11/23  0806 04/22/21  1022 04/08/21  0722 02/27/21  0853  02/25/21  0542   WBC 7.6 8.5 9.9 11.6*   < >  --    < > 6.3   < > 2.9*   25547  --   --   --   --   --  3.7*  3.7*   < >  --    < >  --    ANEU  --   --   --   --   --   --   --  4.8  --  1.9   ANEUTAUTO 7.2 8.1  --  11.0*   < >  --    < >  --   --   --    ALYM  --   --   --   --   --   --   --  0.7*  --  0.6*   ALYMPAUTO 0.1* 0.1*  --  0.2*   < >  --    < >  --   --   --    SHERRI  --   --   --   --   --   --   --  0.8  --  0.4   AMONOAUTO 0.1 0.1  --  0.1   < >  --    < >  --   --   --    AEOS  --   --   --   --   --   --   --  0.1  --  0.1   AEOSAUTO 0.1 0.1  --  0.1   < >  --    < >  --   --   --    ABSBASO 0.0 0.0  --  0.0   < >  --    < >  --   --   --    HGB 9.3* 10.5* 8.6* 10.1*   < >  --    < > 9.7*   < > 9.9*   08209  --   --   --   --   --  11.4*  11.4*   < >  --    < >  --    HCT 29.4* 33.3* 27.1* 30.8*   < >  --    < > 29.9*   < > 32.0*   * 143* 131* 142*   < >  --    < > 178   < > 293   02946  --   --   --   --   --  167  167   < >  --    < >  --     < > = values in this interval not displayed.       Clotting Studies    Recent Labs   Lab Test 10/24/23  1033 08/16/23  0827 05/26/22  0750 09/15/21  0915 02/19/21  0458 02/12/21  0315   INR 0.99 0.9  0.9 0.96 0.96   < >  --    PTT  --   --   --   --   --  28    < > = values in this interval not displayed.       Iron Testing    Recent Labs   Lab Test 10/27/23  0701 02/12/21 0315 02/11/21  0645 02/04/21  0310 02/03/21  0415 12/14/18  0951 12/13/18  1033 09/04/18  1052 08/20/18  0553 08/02/18  1100 08/01/18  0921   IRON  --   --   --   --  51  --  16*  --   --   --  93   FEB  --   --   --   --  143*  --  221*  --   --   --  248   IRONSAT  --   --   --   --  36  --  7*  --   --   --  37   YOLA  --   --   --   --   --   --  302*  --   --   --  571*   *   < > 92   < > 91   < > 107*   < >  --    < > 101*   FOLIC  --   --   --   --   --   --   --   --  43.4  --   --    B12  --   --   --   --   --   --   --   --   --   --  1,753*   HAPT  --   --    "--   --   --   --  296*  --   --   --   --    RETP  --   --  2.9*  --   --    < > 4.6*  --   --    < >  --    RETICABSCT  --   --  72.4  --   --    < > 94.2  --   --    < >  --     < > = values in this interval not displayed.       Markers  No lab results found.    Invalid input(s): \"FETOPROTEIN\", \"SERUM\", \"AFP\"    Autoimmune Testing    Recent Labs   Lab Test 05/16/18  1006 02/22/18  1800   F2JPUJQ 113  --    RNPIGG 0.2  --    SMIGG <0.2  --    SSAIGG 2.8*  --    SSBIGG 7.3*  --    SCLIGG <0.2 <0.2       Arterial Blood Gas Testing    Recent Labs   Lab Test 10/17/23  1035 02/10/21  2000 02/10/21  1555 02/09/21  1225 02/09/21  0403 02/08/21  1200   PH  --  7.41 7.36 7.38 7.42 7.40   PCO2  --  42 46* 49* 44 47*   PO2  --  90 105 74* 116* 72*   HCO3  --  26 26 29* 28 29*   O2PER 21.0 50 50 60 60.0 60        Thyroid Studies     Recent Labs   Lab Test 07/11/23  0952 08/01/18  0921 02/19/18  0759   TSH 1.23 1.80 3.07       Urine Studies     Recent Labs   Lab Test 01/24/21  1729 10/21/19  2240 09/12/19  0125 08/07/19  1512 03/04/18  1425   URINEPH 5.0 5.0 7.5* 7.0 5.5   NITRITE Negative Negative Negative Negative Negative   LEUKEST Moderate* Large* Negative Trace* Negative   WBCU 34* 115* 3 19* 5   UWBCLM Present*  --   --   --   --        Medication levels    Recent Labs   Lab Test 10/26/23  0606 10/24/23  1033 03/31/20  1131 03/19/20  1032   VANCOMYCIN 18.6  --    < >  --    VCON  --   --   --  1.4   TACROL  --  7.8   < > 10.3    < > = values in this interval not displayed.       CSF testing   No lab results found.    Invalid input(s): \"CADAM\", \"EVPCR\", \"ENTPCR\", \"ENTEROVIRUS\"    Body fluid stats    Recent Labs   Lab Test 08/17/23  1144 08/17/23  1137 02/11/22  1308 02/11/22  1308 04/30/21  1937 04/29/21  1315 02/03/21  0941 01/25/21  1347 01/24/21  1629 02/20/20  0800 10/22/19  1430 01/17/19  1207 12/14/18  1054   FTYP  --   --   --   --  Pleural fluid  --   --  Pleural fluid Bronchoalveolar Lavage   < > Ascites   < > " Bronchial washing   FCOL Pink* Colorless  --  Colorless Slightly Bloody  --   --  Yellow Pink   < > Yellow   < > [place]   FAPR Hazy* Hazy*   < > Cloudy* Cloudy  --   --  Cloudy Slightly Cloudy   < > Clear   < > Turbid   FRBC  --   --   --   --   --   --   --   --   --   --   --   --  << Do Not Report >>   FWBC 267 236  --  63 9460  --   --  660 355   < > 44   < > 9520   FNEU 7 2   < >  --   --   --   --  87 81   < > 4   < > 94   FLYM 4 4   < > 12  --   --   --   --  5   < > 49  --  1   FMONO 78 88   < >  --   --   --   --  13  --    < >  --    < > 4   FBAS 1  --   --   --   --   --   --   --   --    < >  --   --   --    FOTH  --   --   --  88 0  --   --   --  11  --  47   < >  --    FALB  --   --   --   --   --   --   --   --   --   --  0.8  --   --    FTP  --   --   --   --  3.3  --   --  3.4  --   --  1.7  --   --    GS  --   --   --   --   --  No organisms seen  Rare  WBC'S seen  predominantly PMN's     < > No organisms seen  Moderate  WBC'S seen  predominantly mononuclear cells   >25 PMNs/low power field  No organisms seen   < > No organisms seen  Few  WBC'S seen  predominantly mononuclear cells    Quantification of host cells and microbiological organisms was done on a cytocentrifuged   preparation.     < > >25 PMNs/low power field  Many  Gram negative diplococci  *  Moderate  Gram positive cocci  *    < > = values in this interval not displayed.       Microbiology:  Fungal testing  Recent Labs   Lab Test 10/17/23  1105 08/17/23  1144 08/17/23  1137 09/15/21  0915 04/30/21  2036 10/07/19  1544 09/14/19  1625   FGTL  --   --   --  210 459   < > 122   FGTLI  --   --   --  Positive* Positive*   < > Positive*   PJRDFA Not Detected Not Detected   < >  --   --    < >  --    ASPGAI  --  0.05   < >  --  0.08   < > 1.08   ASPAG  --  Negative   < >  --   --    < >  --    ASPGAA  --   --   --   --  Negative   < > Positive*   CIABB  --   --   --   --   --   --  <1:2    < > = values in this interval not displayed.        Last Culture results   S Pneumoniae Antigen   Date Value Ref Range Status   01/24/2021   Final    Negative, no Streptococcus pneumoniae antigen detected by immunochromatographic membrane   assay. A negative Streptococcus pneumoniae antigen result does not rule out infection with   Streptococcus pneumoniae.       P. jirovecii By PCR   Date Value Ref Range Status   10/17/2023 Not Detected  Final     Comment:     NOT DETECTED - A negative result does not rule out the   presence of PCR inhibitors in the patient specimen or   assay specific nucleic acid in concentrations below the   level of detection by the assay.    This test was developed and its performance characteristics   determined by [facility]. It has not been cleared or   approved by the US Food and Drug Administration. This test   was performed in a CLIA certified laboratory and is   intended for clinical purposes.  Performed By: [facility]  [address] [place] [address]  : Rogelio Llanos MD, PhD  CLIA Number: [id number]   08/17/2023 Not Detected  Final     Comment:     NOT DETECTED - A negative result does not rule out the   presence of PCR inhibitors in the patient specimen or   assay specific nucleic acid in concentrations below the   level of detection by the assay.    This test was developed and its performance characteristics   determined by [facility]. It has not been cleared or   approved by the US Food and Drug Administration. This test   was performed in a CLIA certified laboratory and is   intended for clinical purposes.  Performed By: [facility]  [address] [place], [place] [id number]  : Rogelio Llanos MD, PhD  CLIA Number: [id number]   08/17/2023 Not Detected  Final     Comment:     NOT DETECTED - A negative result does not rule out the   presence of PCR inhibitors in the patient specimen or   assay specific nucleic acid in concentrations below the    level of detection by the assay.    This test was developed and its performance characteristics   determined by [facility]. It has not been cleared or   approved by the US Food and Drug Administration. This test   was performed in a CLIA certified laboratory and is   intended for clinical purposes.  Performed By: [facility]  [address] [place] [id number]  : Rogelio Llanos MD, PhD  CLIA Number: [id number]   01/24/2021 Detected (A)  Final     Comment:     (Note)  DETECTED - P. jirovecii DNA detected in this specimen.  Results should be used to aid in the diagnosis of PCP  pneumonia and must be interpreted in the context of host  risk factors, clinical presentation, and radiographic  imaging.  TEST INFORMATION: Pneumocystis jirovecii Detection by PCR  Test developed and characteristics determined by [facility]. See Compliance Statement B: [facility]/  Performed by [facility],  [address] [place] [id number] [phone]  [email or web address], Carri Red MD, Lab. Director       Culture   Date Value Ref Range Status   10/26/2023   Final    >10 Squamous epithelial cells/low power field indicates oral contamination. Please recollect.   10/25/2023 No growth after 1 day  Preliminary   10/25/2023 No growth after 1 day  Preliminary   10/17/2023 1+ Actinomyces species (A)  Final     Comment:     Susceptibilities not routinely done, refer to antibiogram to view typical susceptibility profiles  This organism is part of normal alo, but on occasion may be a true pathogen. Clinical correlation must be applied to interpreting this result.   10/17/2023 1+ Normal alo  Final   10/17/2023 No growth after 10 days  Preliminary   10/17/2023 No growth after 10 days  Preliminary   08/17/2023 No Growth  Final   08/17/2023 No Growth  Final   08/17/2023 1+ Normal alo  Final   08/17/2023 1+ Stenotrophomonas maltophilia (A)  Final     Comment:     Susceptibilities done on  "previous cultures   08/17/2023 No Growth  Final   08/17/2023 No Growth  Final   08/17/2023 1+ Normal alo  Final   08/17/2023 1+ Stenotrophomonas maltophilia (A)  Final   02/11/2022 No Actinomyces isolated  Final   02/11/2022 No Growth  Final   02/11/2022 No Growth  Final   09/23/2021   Final    >10 Squamous epithelial cells/low power field indicates oral contamination. Please recollect.   09/23/2021 No Growth  Final     Culture Micro   Date Value Ref Range Status   05/01/2021   Final    Quantity not sufficient  Called to Maxim Norman at 1236 5/1/21.    mlb     05/01/2021 Culture negative after 30 days  Final   04/29/2021 No anaerobes isolated  Final   04/29/2021 No growth  Final   02/19/2021 Culture negative for acid fast bacilli  Final   02/19/2021   Final    Assayed at [facility] [address] [place] [id number] [phone]   02/19/2021 Culture negative after 4 weeks  Final   02/19/2021 No growth after 4 weeks  Final   02/19/2021 Moderate growth  Staphylococcus epidermidis   (A)  Final   02/09/2021 No growth  Final         Last checks of Clostridioides difficile testing  Recent Labs   Lab Test 02/13/21  0530 12/04/18  1500 08/02/18  2253 03/24/18  1022   CDBPCT Negative Negative Positive* Negative       Syphilis Testing    No results found for: \"TREPT\", \"TREPAB\", \"RPR\", \"TPPAT\", \"CVD\"    Quantiferon testing   Recent Labs   Lab Test 10/27/23  0701 10/26/23  0606 09/20/21  1903 05/01/21  1102 02/22/21  0527 02/19/21  0853 02/11/21  0645 01/25/21  1347 01/24/21  1629 10/28/19  0553 10/25/19  1628 10/06/19  1444 09/14/19  1625 02/21/18  1439 02/19/18  0759   TBRES  --   --   --   --   --   --   --   --   --   --  Negative  --  Negative  --   --    TBRSLT  --   --   --   --   --   --   --   --   --   --   --   --   --   --  Negative   TBAGN  --   --   --   --   --   --   --   --   --   --   --   --   --   --  0.00   LYMPH 1 1   < >  --    < >  --    < >  --   --    < >  --    < >  --    < > 8.4   AFBSMS " " --   --   --  Quantity not sufficient  Called to Dr Maxim Norman at 1236 5/1/21.    mlb    --  Negative for acid fast bacteria  Less than 5ml of specimen received.  A minimum of 5 mL of sputum or fluid is recommended for recovery of acid fast bacilli   (AFB).  Volumes less than 5 mL are suboptimal and may compromise recovery of AFB from   culture.    Assayed at [facility] [address] [place] [address] [phone]  --  Negative for acid fast bacteria  Less than 5ml of specimen received.  A minimum of 5 mL of sputum or fluid is recommended for recovery of acid fast bacilli   (AFB).  Volumes less than 5 mL are suboptimal and may compromise recovery of AFB from   culture.    Assayed at [facility] [address] [place] [address] [phone] Negative for acid fast bacteria  Less than 5ml of specimen received.  A minimum of 5 mL of sputum or fluid is recommended for recovery of acid fast bacilli   (AFB).  Volumes less than 5 mL are suboptimal and may compromise recovery of AFB from   culture.    Assayed at [facility] [address] [place] [address] [phone]   < >  --    < >  --    < >  --     < > = values in this interval not displayed.       Infection Studies to assess Diarrhea  No lab results found.    Invalid input(s): \"BIDYD\"    Virology:  Coronavirus-19 testing    Recent Labs   Lab Test 07/11/23  1132 10/12/22  1528 09/26/22  0956 05/23/22  1013 04/01/22  1257 02/07/22  1301 01/31/22  1309 01/24/22  1324 09/20/21  1859 09/15/21  0623 06/07/21  1310 05/02/21  0715 04/26/21  1151 04/08/21  0723   ASB16TI  --   --   --   --   --   --   --   --   --   --   --  Negative  --   --    [id number]  --   --   --   --   --   --   --   --   --   --   --  Not Applicable  --   --    [id number] Negative  --   --   --   --   --   --   --  Negative Negative  --   --   --  Test received-See reflex to IDDL test SARS CoV2 (COVID-19) Virus RT-PCR  NEGATIVE   [id number]  --   --   --   " --   --   --   --   --   --   --   --   --   --  Nasopharyngeal   [id number]  --   --   --   --   --   --   --   --   --   --   --   --   --  Nasopharyngeal   COVIDPCREXT  --  Negative Negative Undetected   < > Undetected Undetected   < >  --   --    < >  --   --   --    SOUREXT  --   --   --  Nasopharynx  --  Nasopharynx Nasopharynx   < >  --   --    < >  --    < >  --    [id number]  --   --   --  Undetected  --  Undetected Undetected   < >  --   --    < >  --    < >  --     < > = values in this interval not displayed.       Respiratory virus (non-coronavirus-19) testing    Recent Labs   Lab Test 10/27/23  0449 08/17/23  1144 08/17/23  1137 01/24/21  1822 01/24/21  1629 12/23/20  1102 02/27/18  1016 02/22/18  0900   RVSPEC  --   --   --   --  Bronchial Bronchial   < >  --    AFLU  --   --   --   --   --   --   --  Duplicate request*   IFLUA Not Detected Not Detected Not Detected   < > Negative Negative   < >  --    INFZA  --   --   --   --   --   --   --  Negative   FLUAH1 Not Detected Not Detected Not Detected   < > Negative Negative   < >  --    [id number] Not Detected Not Detected Not Detected   < > Negative Negative   < >  --    FLUAH3 Not Detected Not Detected Not Detected   < > Negative Negative   < >  --    BFLU  --   --   --   --   --   --   --  Duplicate request*   IFLUB Not Detected Not Detected Not Detected   < > Negative Negative   < >  --    INFZB  --   --   --   --   --   --   --  Negative   PIV1 Not Detected Not Detected Not Detected   < > Negative Negative   < >  --    PIV2 Not Detected Not Detected Not Detected   < > Negative Negative   < >  --    PIV3 Not Detected Not Detected Not Detected   < > Negative Negative   < >  --    PIV4 Not Detected Not Detected Not Detected   < >  --   --    < >  --    IRSV  --   --   --   --   --   --   --  Negative   HRVS  --   --   --   --  Negative Negative   < >  --    RSVA Not Detected Not Detected Not Detected   < > Negative Negative   < >  --    RSVB Not  Detected Not Detected Not Detected   < > Negative Negative   < >  --    HMPV Not Detected Not Detected Not Detected   < > Negative Negative   < >  --    ADVBE  --   --   --   --  Negative Negative   < >  --    ADVC  --   --   --   --  Negative Negative   < >  --    ADENOV Not Detected Not Detected Not Detected   < >  --   --    < >  --    CORONA Not Detected Not Detected Not Detected   < >  --   --    < >  --     < > = values in this interval not displayed.       CMV viral loads    Recent Labs   Lab Test 10/24/23  1033 10/17/23  1011 10/11/23  0806 10/04/23  0810 09/07/23  0710 08/17/23  1144 08/17/23  1137 08/08/23  0828 08/08/23  0828 07/11/23  0952 05/26/23  0828 04/06/23  0725 03/08/23  0848 02/09/23  1034 11/18/22  0928 09/27/22  1012 06/27/22  1013 05/26/22  0750 03/03/22  1054 02/11/22  1308 02/10/22  0919 11/10/21  1106 10/12/21  1251 09/29/21  1105 09/21/21  1055 03/15/21  0904 02/25/21  0542   CMVQNT  --   --   --   --   --   --   --   --   --  Not Detected  --  Not Detected  --  <137*  --  Not Detected  --  <137*  --  Not Detected Not Detected  --  Not Detected  --  Not Detected  --  CMV DNA Not Detected   CMVRESINST 103*  80* 75*  --   --  521*  --   --   --  46*  --   --   --   --   --   --   --   --   --   --   --   --   --   --   --   --   --   --    CSPEC  --   --   --   --   --   --   --   --   --   --   --   --   --   --   --   --   --   --   --   --   --   --   --   --   --   --  EDTA PLASMA   CMVLOG 2.0  1.9 1.9  --   --  2.7  --   --    < > 1.7  --   --   --   --  <2.1  --   --   --  <2.1   < >  --   --   --   --   --   --   --  Not Calculated   51326  --   --  2,680*  2,680*   < >  --   --   --   --   --   --    < >  --    < >  --    < >  --    < >  --    < >  --   --    < >  --    < >  --    < >  --    CMVQAL  --   --   --   --   --  Not Detected Not Detected  --   --   --   --   --   --   --   --   --   --   --   --   --   --   --   --   --   --   --   --     < > = values in this interval  "not displayed.       CMV resistance testing  Recent Labs   Lab Test 08/03/18  0624   CMVCID Sensitive   CMVFOS Sensitive   CMVGAN Sensitive       No results found for: \"H6RES\"    EBV DNA Copies/mL   Date Value Ref Range Status   08/08/2023 42,461 (H) <=0 copies/mL Final   07/11/2023 49,591 (H) <=0 copies/mL Final   04/06/2023 43,506 (H) <=0 copies/mL Final   03/01/2023 83,207 (H) <=0 copies/mL Final   02/09/2023 114,508 (H) <=0 copies/mL Final   09/27/2022 21,749 (H) <=0 copies/mL Final   05/26/2022 77,030 (H) <=0 copies/mL Final   02/10/2022 135,117 (H) <=0 copies/mL Final   10/12/2021 969,411 (H) <=0 copies/mL Final   09/15/2021 45,122 (H) <=0 copies/mL Final   05/01/2021 38,186 (A) EBVNEG^EBV DNA Not Detected [Copies]/mL Final   02/22/2021 75,709 (A) EBVNEG^EBV DNA Not Detected [Copies]/mL Final   01/25/2021 5,619 (A) EBVNEG^EBV DNA Not Detected [Copies]/mL Final   12/09/2020 10,686 (A) EBVNEG^EBV DNA Not Detected [Copies]/mL Final   09/09/2020 2,935 (A) EBVNEG^EBV DNA Not Detected [Copies]/mL Final   03/09/2020 8,918 (A) EBVNEG^EBV DNA Not Detected [Copies]/mL Final     EBV DNA Quant (External)   Date Value Ref Range Status   10/11/2023 <35 (A) Undetected IU/mL Final   05/26/2023 <35 (A) Undetected IU/mL Final   03/08/2023 <35 (A) undetected IU/ml Final   03/08/2023 <35 (A) Undetected IU/mL Final   01/13/2023 Undetected Undetected IU/mL Final     EBV Interp DNA Quant (External)   Date Value Ref Range Status   03/08/2023   Final    EBV DNA is detected, but level present is <35 IU/mL (<1.54 log IU/mL). This assay cannot accurately quantify EBV DNA below this level.       BK Virus Result   Date Value Ref Range Status   08/07/2019 BK Virus DNA Not Detected BKNEG^BK Virus DNA Not Detected copies/mL Final       Parvovirus Testing  No lab results found.    Invalid input(s): \"PRVRES\"    Adenovirus Testing  No lab results found.    Invalid input(s): \"ADENAB\", \"ADENOVIRUS\", \"ADQT\"    Hepatitis B Testing     Recent Labs "   Lab Test 10/26/23  1233 09/21/21  1055 10/25/19  1800 06/05/19  1156 06/05/18  1029 03/01/18  0356   AUSAB 0.67 0.50   < >  --   --  0.41   HBCAB  --   --   --  Nonreactive  --  Nonreactive   HEPBANG Nonreactive Nonreactive   < > Nonreactive   < > Nonreactive    < > = values in this interval not displayed.        Hepatitis C Antibody   Date Value Ref Range Status   06/05/2019 Nonreactive NR^Nonreactive Final     Comment:     Assay performance characteristics have not been established for newborns,   infants, and children     02/19/2018 Nonreactive NR^Nonreactive Final     Comment:     Assay performance characteristics have not been established for newborns,   infants, and children         CMV Antibody IgG   Date Value Ref Range Status   03/01/2018 Canceled, Test credited 0.0 - 0.8 AI Final     Comment:     Test reordered as correct code  SEE CMV QUANTITATIVE PCR     03/01/2018 >8.0 (H) 0.0 - 0.8 AI Final     Comment:     Positive  Antibody index (AI) values reflect qualitative changes in antibody   concentration that cannot be directly associated with clinical condition or   disease state.     02/19/2018 >8.0 (H) 0.0 - 0.8 AI Final     Comment:     Positive  Antibody index (AI) values reflect qualitative changes in antibody   concentration that cannot be directly associated with clinical condition or   disease state.       Varicella Zoster Virus Antibody IgG   Date Value Ref Range Status   02/19/2018 3.8 (H) 0.0 - 0.8 AI Final     Comment:     Positive, suggests prev. exposure and probable immunity  Antibody index (AI) values reflect qualitative changes in antibody   concentration that cannot be directly associated with clinical condition or   disease state.       EBV Capsid Antibody IgG   Date Value Ref Range Status   03/01/2018 >8.0 (H) 0.0 - 0.8 AI Final     Comment:     Positive, suggests recent or past exposure  Antibody index (AI) values reflect qualitative changes in antibody   concentration that cannot be  directly associated with clinical condition or   disease state.     02/19/2018 >8.0 (H) 0.0 - 0.8 AI Final     Comment:     Positive, suggests recent or past exposure  Antibody index (AI) values reflect qualitative changes in antibody   concentration that cannot be directly associated with clinical condition or   disease state.       Toxoplasma Antibody IgG   Date Value Ref Range Status   02/19/2018 <3.0 0.0 - 7.1 IU/mL Final     Comment:     Negative- Absence of detectable Toxoplasma gondii IgG antibodies. A negative   result does not rule out acute infection.  The magnitude of the measured result is not indicative of the amount of   antibody present. The concentrations of anti-Toxoplasma gondii IgG in a given   specimen determined with assays from different manufacturers can vary due to   differences in assay methods and reagent specificity.       Herpes Simplex Virus Type 1 IgG   Date Value Ref Range Status   03/01/2018 >8.0 (H) 0.0 - 0.8 AI Final     Comment:     Positive.  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  Antibody index (AI) values reflect qualitative changes in antibody   concentration that cannot be directly associated with clinical condition or   disease state.     02/19/2018 >8.0 (H) 0.0 - 0.8 AI Final     Comment:     Positive.  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  Antibody index (AI) values reflect qualitative changes in antibody   concentration that cannot be directly associated with clinical condition or   disease state.       Herpes Simplex Virus Type 2 IgG   Date Value Ref Range Status   03/01/2018 <0.2 0.0 - 0.8 AI Final     Comment:     No HSV-2 IgG antibodies detected.  Antibody index (AI) values reflect qualitative changes in antibody   concentration that cannot be directly associated with clinical condition or   disease state.     02/19/2018 <0.2 0.0 - 0.8 AI Final     Comment:     No HSV-2 IgG antibodies detected.  Antibody index (AI) values reflect qualitative changes in antibody   concentration  that cannot be directly associated with clinical condition or   disease state.         Imaging:  Recent Results (from the past 48 hour(s))   US Lower Extremity Venous Duplex Bilateral    Narrative    EXAMINATION: DOPPLER VENOUS ULTRASOUND OF BILATERAL LOWER EXTREMITIES,  10/26/2023 2:09 AM     COMPARISON: Lower extremity venous duplex ultrasound 10/6/2019    HISTORY: Rule out PE in ESRD patient with hypotension, tachycardia,  not responding to fluids    TECHNIQUE:  Gray-scale evaluation with compression, spectral flow and  color Doppler assessment of the deep venous system of both legs from  groin to knee, and then at the ankles.    FINDINGS:  In both lower extremities, the common femoral, femoral, popliteal and  posterior tibial veins demonstrate normal compressibility and blood  flow.      Impression    IMPRESSION:  No evidence of deep venous thrombosis in either lower extremity.    I have personally reviewed the examination and initial interpretation  and I agree with the findings.    REJI VIVAS DO         SYSTEM ID:  [id number]   Echocardiogram Complete   Result Value    LVEF  55-60%    LVEF  60-65%    Narrative    [id number]  [id number]  [id number]  [id number]^NOHEMI^GISSEL     [facility]  Echocardiography [facility]  [address] [place] [address] [id number]     Name: SARAI KILLIAN  MRN: [id number]  : 1960  Study Date: 10/27/2023 08:08 AM  Age: 62 yrs  Gender: Female  [place] [facility] [place]  Reason For Study: Other, Please Specify in Comments  Ordering Physician: GISSEL SONG  Performed By: Kel Cruz     BSA: 1.6 m2  Height: 63 in  Weight: 133 lb  HR: 115  BP: 116/79 mmHg  ______________________________________________________________________________  Procedure  Complete Portable Echo Adult. Contrast Optison. TDS - pt scanned sitting due  to SOB, arrhythmia. Optison (NDC #5278-6594-18) given intravenously. Patient  was given 5 ml mixture of 3 ml Optison and 6 ml saline.  4 ml wasted. The  patient's rhythm is atrial fibrillation.  ______________________________________________________________________________  Interpretation Summary  The patient's rhythm is atrial fibrillation with RVR.     The visual ejection fraction is 60-65%.  No regional wall motion abnormalities are seen.  Left ventricular size is normal.  Global right ventricular function/size appear normal (RV is more difficult to  visualize).  No significant valvular abnormalities present.  Mild tricuspid insufficiency is present.  The right ventricular systolic pressure is 45mmHg above the right atrial  pressure.  Pulmonary hypertension is present.     This study was compared with the study from 7/26/2022 .Afib RVR and pulm HTN  are new findings.  ______________________________________________________________________________  Left Ventricle  Global and regional left ventricular function is normal with an EF of 55-60%.  Left ventricular size is normal. Mild concentric wall thickening consistent  with left ventricular hypertrophy is present. The visual ejection fraction is  60-65%. Left ventricular diastolic function is not assessable. No regional  wall motion abnormalities are seen.     Right Ventricle  Global right ventricular function is normal. The right ventricle is normal  size.     Atria  Both atria appear normal.     Mitral Valve  The mitral valve is normal.     Aortic Valve  Aortic valve sclerosis is present. On Doppler interrogation, there is no  significant stenosis or regurgitation.     Tricuspid Valve  Mild tricuspid insufficiency is present. Pulmonary hypertension is present.  The right ventricular systolic pressure is 45mmHg above the right atrial  pressure.     Pulmonic Valve  On Doppler interrogation, there is no significant stenosis or regurgitation.     Vessels  The aorta root is normal. The inferior vena cava cannot be assessed. The  thoracic aorta cannot be assessed.     Pericardium  No pericardial  effusion is present.     Miscellaneous  No significant valvular abnormalities present.     Compared to Previous Study  This study was compared with the study from 2022 . Afib RVR is new.  ______________________________________________________________________________  MMode/2D Measurements & Calculations  IVSd: 1.0 cm  LVIDd: 4.6 cm  LVIDs: 3.7 cm  LVPWd: 1.2 cm  FS: 19.5 %  LV mass(C)d: 182.6 grams  LV mass(C)dI: 112.3 grams/m2  Ao root diam: 3.3 cm  asc Aorta Diam: 3.2 cm  LVOT diam: 1.9 cm  LVOT area: 2.8 cm2  Ao root diam index Ht(cm/m): 2.1  Ao root diam index BSA (cm/m2): 2.0  Asc Ao diam index BSA (cm/m2): 2.0  Asc Ao diam index Ht(cm/m): 2.0  LA Volume (BP): 53.7 ml     LA Volume Index (BP): 32.9 ml/m2  RWT: 0.51     Doppler Measurements & Calculations  MV E max herberth: 73.4 cm/sec  MV A max herberth: 68.1 cm/sec  MV E/A: 1.1  MV dec slope: 285.3 cm/sec2  MV dec time: 0.27 sec  PA acc time: 0.10 sec  TR max herberth: 336.5 cm/sec  TR max P.3 mmHg  E/E' av.7  Lateral E/e': 6.5  Medial E/e': 8.8     ______________________________________________________________________________  Report approved by: Rebecca CAMPOS 10/27/2023 10:51 AM         XR Chest 2 Views    Narrative    XR CHEST 2 VIEWS  10/27/2023 9:46 AM     HISTORY:  S/p lung transpant, f/u consolidations       COMPARISON:  Chest radiograph 10/25/2023, CT chest 10/24/2023    TECHNIQUE: XR CHEST 2 VIEWS    FINDINGS:   Mild by basilar predominant mixed airspace and interstitial opacities,  with otherwise interval improved aeration compared to prior  radiograph.  Small bilateral pleural effusions. No pneumothorax. Cardiac silhouette  is normal.        Impression    IMPRESSION:  Persistent bibasilar mild mixed interstitial and airspace  opacification, with improved aeration compared to 10/25/2023. Small  bilateral pleural effusions.    I have personally reviewed the examination and initial interpretation  and I agree with the findings.    TERRI STERN  MD MAEGAN         SYSTEM ID:  [id number]   US Upper Extremity Venous Duplex Left    Narrative    EXAMINATION: DOPPLER VENOUS ULTRASOUND OF THE LEFT UPPER EXTREMITY,  10/27/2023 11:51 AM     COMPARISON: Ultrasound 10/25/2023, CT 10/24/2023    HISTORY: Isolated edema of left upper extremity, assess for DVT     TECHNIQUE:  Gray-scale evaluation with compression, spectral flow and  color Doppler assessment of the deep venous system of the left upper  extremity.    FINDINGS:    Left: Normal blood flow and waveforms are demonstrated in the internal  jugular, innominate, subclavian, and axillary veins. There is normal  compressibility of the brachial, basilic and cephalic veins.    Subcutaneous soft tissue thickening in the distal left upper extremity  may represent subcutaneous edema.    Multiple left cervical lymph nodes incidentally seen, each retaining  fatty marguerite, the largest measures 0.9 cm short axis.      Impression    IMPRESSION:    1. No deep venous thrombosis demonstrated in the left upper extremity.  2. Incidental left cervical lymphadenopathy, possibly reactive,  consider attention on follow-up.  3. Subcutaneous soft tissue thickening in the distal left upper  extremity may represent subcutaneous edema versus cellulitis in the  appropriate clinical settings.    I have personally reviewed the examination and initial interpretation  and I agree with the findings.    EDIE VILLA MD         SYSTEM ID:  [id number]     CT CHEST 10/24/2023                                                                   IMPRESSION: Worsening consolidation bilaterally concerning for  worsening infection. New prominent left upper lobe anterior  pleural-based soft tissue thickening. Recommend follow to clearing.  Neoplasm somewhat less likely given the short interval but cannot be  entirely excluded. Mild air trapping.  Ectasia of the mid ascending thoracic aorta.  Left breast skin thickening and edema in the breast tissues  again  present. Rim calcified almost certainly benign density at the left  lung base. Trace pleural effusions bilaterally.  Unchanged osteopenic T5 compression deformity.  Atherosclerosis.

## 2023-10-27 NOTE — CONSULTS
Care Management Initial Consult    General Information  Assessment completed with: Patient and Spouse, Ranjan  Type of CM/SW Visit: Initial Assessment  Primary Care Provider verified and updated as needed:  yes  Readmission within the last 30 days:  n/a  Reason for Consult: discharge planning  Advance Care Planning,  Reviewed: present on chart        Communication Assessment  Patient's communication style: spoken language (English or Bilingual)    Hearing Difficulty or Deaf: no      Cognitive  Cognitive/Neuro/Behavioral: WDL                      Living Environment:   People in home: spouse     Current living Arrangements: house      Able to return to prior arrangements: yes     Family/Social Support:  Care provided by: self  Provides care for: no one  Marital Status:   Support System: Children,           Description of Support System: Supportive, Involved       Current Resources:   Patient receiving home care services: No  Community Resources: Dialysis Services  Equipment currently used at home: grab bar, toilet, grab bar, tub/shower, shower chair, wheelchair, manual, cane, straight  Supplies currently used at home: Oxygen Tubing/Supplies, Nebulizer tubing    Employment/Financial:  Employment Status: disabled     Financial Concerns: none   Referral to Financial Worker: No  Does the patient's insurance plan have a 3 day qualifying hospital stay waiver?  No    Lifestyle & Psychosocial Needs:  Social Determinants of Health     Food Insecurity: Not on file   Depression: Not at risk (4/24/2023)    PHQ-2     PHQ-2 Score: 0   Housing Stability: Not on file   Tobacco Use: Low Risk  (10/25/2023)    Patient History     Smoking Tobacco Use: Never     Smokeless Tobacco Use: Never     Passive Exposure: Not on file   Financial Resource Strain: Not on file   Alcohol Use: Not on file   Transportation Needs: Not on file   Physical Activity: Not on file   Interpersonal Safety: Not on file   Stress: Not on file   Social  Connections: Not on file     Functional Status:  Prior to admission patient needed assistance:   Dependent ADLs:: Independent  Dependent IADLs:: Independent     Mental Health Status:  Mental Health Status: No Current Concerns       Chemical Dependency Status:  Chemical Dependency Status: No Current Concerns             Values/Beliefs:  Spiritual, Cultural Beliefs, Denominational Practices, Values that affect care: no               Additional Information:  CMA completed at bedside with pt and spouse, Ranjan; listed address, PCP ([facility]), and payer source(s) confirmed (IMM needed). Primary services also supported via [facility], detailed below. Pt reports a multitude of DME at the home, much of which are of PRN use, including walker, cane, shower chair, bathroom grab bars. Home oxygen support is available, arranged via private pay w/o affiliated vendor. , Ranjan, confirmed his plan to provide discharge transportation. Pt also has 3 adult daughters in local proximity for supportive involvement. OP HD services rendered via [facility] (detailed below). Pt denies need for financial, spiritual/Mandaen, BH/ESTELLA support. RNCC to continue to follow and support safe discharge planning.      [facility]: [facility]  [address] [place] [address]  Phone: [phone]  Fax: [phone]    - Pt also mentioned an occasional visit to [facility] for nephrology services, however RNCC was unable to confirm her as an active pt; [facility]'s note listed Dr. Glasgow of [facility] was a confirmed provider within [facility]'s Nephrology Dept.    Local PCP - [facility]  Elva Hunt PA-C  [address] [place] [address]  Phone: [phone]      Mohan Dietz RN BSN  7C RNCC  Phone: [phone]  Pager: [phone]    For Weekend & Holiday on call RN Care Coordinator:  (Tasks: Home care, home infusion, medical equipment, transportation, IMM & JARAMILLO forms, etc.)  [facility]  Bank (0800 - 1630) Saturday and Sunday    Units: 5A, 5B, & 5C: [phone]    Units: 6B, 6C, 6D: [phone]    Units: 7A, 7B, 7C, 7D: [phone]    Units: 6A/ICU : [phone]    [facility] ([id number]) Saturday and Sunday    Units: 6 Med/Surg, 8A, 10 ICU, & Pediatric Units: [phone]    Units: 5 Ortho, 5Med/Surg & WB ED: [phone]

## 2023-10-27 NOTE — PROGRESS NOTES
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  The patient was admitted on 10/25/2023 with malaise, cough, and dyspnea along with 2 weeks of new hypoxia, CT chest concerning for uncontrolled infection.    Today's recommendations:  - Follow pending blood cultures (10/25)  - Sputum cultures, Histo urine Ag, and UA/UC ordered pending collection  - ABX per transplant ID, low threshold to broaden with clinical decline  - Wean supplemental oxygen to maintain SpO2>92%  - CXR and CT PE today as below  - DSA and ImmuKnow (10/27) pending  - Tacrolimus level ordered 10/28  - Hold azithromycin for now with prolonged QTc  - Fungitell and Aspergillus galactomannan (10/27) pending  - Continue posaconazole, repeat level (10/27) pending  - VGCV dose increased per transplant ID, repeat CMV level ordered 10/31  - EBV ordered  - Rheumatology consult     Acute hypoxic respiratory failure:  Concern for pneumonia:  Hypotension:  Arrhythmia: Progressive malaise, productive cough of clear sputum, and dyspnea for 2 months with occasional wheezing.  S/p bronch/BAL () with Steno s/p Levaquin and Bactrim without improvement.  CT chest (10/11, [facility] overread) with increased GGO and nodules t/o RML and KONRAD.  New hypoxia at home (2L NC x 2 weeks).  S/p OR bronch/BAL (10/17) with RM stenosis dilation (as below).  Felt well for 24 hours then symptoms returned.   Bronch culture (10/17) with Actinomyces sp. and started on Augmentin the day PTA.  CT chest (10/24) with worsening consolidation bilaterally concerning for worsening infection, new prominent left upper lobe anterior pleural-based soft tissue thickening, mild air trapping.  Low grade temp (100 degree F on 10/25), leukocytosis (3.9 --> 11.6), LA 1.1, and procal 0.7.  MRSA nares (10/25), Cryptococcal Ag (10/26), and respiratory panel (10/27) all negative.  DDx includes pneumonia/infection, PE (tachycardia and hypotension, extremity US negative DVT, echo with normal appearing RV although pulmonary HTN noted), worsening CLAD, rejection (DSA pending as below, cannot rule out ACR), cardiac, hypervolemia (missed dialysis 10/25, CXR 10/25 with pulmonary edema).  Significant LUE swelling 10/27, repeat US without DVT although noted left cervical lymphadenopathy and subcutaneous soft tissue thickening in the distal upper extremity.    - Blood cultures (10/25) NGTD  - Bacterial, fungal, AFB, and Nocardia sputum cultures ordered pending collection  - Histo urine Ag, UA/UC ordered pending collection  - Encourage IS and Aerobika  - Nebs: Xopenex and Mucomyst TID (10/26)   - ABX: IV Zosyn (10/25) per transplant ID and anticipate 2 week course and probably deescalate to Augmentin once she improves for discharge; s/p minocycline (10/25-10/26) and vancomycin (10/25) discontinued per transplant ID, low threshold to resume or revisit ABX regimen with clinical decline, if vancomycin resumed premedicate with antihistamine and slow infusion time given vancomycin-infusion erythrodermic pruritic reaction on 10/25  - Supplemental O2 to maintain SpO2>92%, wean as able  - CXR 10/27 with persistent bibasilar mild mixed interstitial and airspace opacification, small bilateral pleural effusions (personally reviewed)  - CT PE 10/27 with no PE, noted decreased edema in the lung with continued small pleural effusions, cardiomegaly, and left breast  skin thickening and breast edema      S/p bilateral sequential lung transplant (BSLT) for ILD: Pulmonary clinic visit with Ame Chow 10/24 with increased cough and dyspena, occasional wheezing, as above.  PFTs decreased.  DSA negative 8/8.  IgG adequate 979 on 10/27.   - DSA (10/27) pending    Immunosuppression: Recent ImmuKnow 479 on 9/7, suggesting increased risk of rejection.  - Tacrolimus 0.5 mg qAM / 1 mg qPM (Pt. missed 10/25 AM dose).  Goal level 8-10.  Repeat level ordered 10/28.  - AZA 50 mg daily (decreased 10/26 from 75 mg daily given concern for infection)  - Prednisone 5 mg daily  - ImmunKnow (10/27) pending     Prophylaxis:   - Bactrim MWF for PJP ppx     CLAD: PTA azithromycin --> hold for now with prolonged QTc, Singulair, and Breo (hospital substitute for Advair)     Right main bronchial stenosis s/p recurrent dilatation: Follows with IP for serial bronchs with dilation.  Last bronch (10/17) with dilation  - Airway clearance as above (PTA albuterol nebs q6h prn, Mucomyst nebs BID prn, and Pulmicort neb daily prn)  - Per transplant ID: recommend post-dilation course of Augmentin for ~2 weeks in the future (or alternatively, 1 week out of each month)     Left-sided Aspergillus empyema: Noted in 2019 s/p needle aspiration with Aspergillus fumigatus on cultures s/p intrapleural amphotericin bead placement.  Posaconazole indefinitely per transplant ID, most recent level 1 on 2/9.    - Fungitell and Aspergillus galactomannan (10/27) pending  - PTA PO posaconazole, repeat level (10/27) pending     Recurrent CMV viremia: Positive at 2,450 on 10/4 and peaked at 2,680 on 10/11, down to  on 10/24.  - CMV weekly, ordered 10/31  - PTA VGCV induction dosing (450 mg MWF after HD), dose increased to 450 mg daily on 10/27 per transplant ID for 3 weeks or until viral load negative x2 checks, then back to 3 times weekly after HD dosing after that    EBV viremia: EBV <35 on 10/11, from 42k with log 4.6 on 8/8.    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   - Rheumatology consult     ESRD on iHD (since 10/2019): On MWF iHD.  Renal transplant evaluation on hold currently for infection and worsening PFTs of unknown etiology.  Missed dialysis run on 10/25.  Management per nephrology.    We appreciate the excellent care provided by the [place] [id number] team.  Recommendations communicated via ]in person rounding, telephone, and this note.  Will continue to follow along closely, please do not hesitate to call with any questions or concerns.    Patient discussed with Dr. Macias.    Cindy Mcadams PA-C  Inpatient [facility]  Pulmonary CF/Transplant     Subjective & Interval History:     On 2L NC, breathing feels comfortable.  Cough productive of clear/white sputum, increased this morning.  Denies chest pain.  Significant/increased LUE swelling this morning, denies pain or numbness/tingling.  Ongoing rash t/o body with possible slight improvement to legs.  Denies itchiness.  Tachycardia and soft BPs noted yesterday, concern for afib.  Denies chest pain, palpitations, dizziness, or lightheadedness.  Tmax 99.5.    Review of Systems:     C: No change in appetite  INTEGUMENTARY/SKIN: See interval history  ENT/MOUTH: No sore throat, no sinus pain, no nasal congestion or drainage  RESP: See interval history  CV: No orthopnea  GI: No nausea, no vomiting, no change in stools, no reflux symptoms  : + oliguric   MUSCULOSKELETAL: No myalgias, no arthralgias  ENDOCRINE: Blood sugars with adequate control  NEURO: No headache, no numbness or tingling  PSYCHIATRIC: Mood stable    Physical Exam:     All notes, images, and labs from past 24 hours (at minimum) were reviewed.    Vital  signs:  Temp: 97.6  F (36.4  C) Temp src: Oral BP: 97/56 Pulse: (!) 121   Resp: 22 SpO2: 97 % O2 Device: Nasal cannula Oxygen Delivery: 2 LPM      I/O:   Intake/Output Summary (Last 24 hours) at 10/27/2023 1545  Last data filed at 10/27/2023 0600  Gross per 24 hour   Intake 120 ml   Output 0 ml   Net 120 ml     Constitutional: Lying in bed, in no apparent distress.   HEENT: Eyes with pink conjunctivae, anicteric.  Oral mucosa moist without obvious lesions.   PULM: Mildly diminished air flow bilaterally.  Scattered crackles.  No rhonchi, no wheezes.  Non-labored breathing on 2L NC.  CV: Tachycardic.  + L>RUE edema.  ABD: NABS, soft, nontender, nondistended.    MSK: Moves all extremities.  No apparent muscle wasting.   NEURO: Alert and conversant.   SKIN: Warm, dry.  + diffuse warm erythematous rash.   PSYCH: Mood stable.     Data:     LABS    CMP:   Recent Labs   Lab 10/27/23  0701 10/26/23  2132 10/26/23  0606 10/26/23  0054 10/25/23  1356 10/24/23  1033   *  --  133* 132* 133* 138   POTASSIUM 3.5  --  4.5 4.1 4.4 3.7   CHLORIDE 95*  --  94* 94* 96* 96*   CO2 26  --  24 25 26 31*   ANIONGAP 12  --  15 13 11 11   *  --  75 81 92 120*   BUN 16.0  --  31.7* 29.5* 24.6* 14.6   CR 3.00*  --  5.61* 5.32* 4.90* 3.43*   GFRESTIMATED 17*  --  8* 9* 10* 15*   CHELSEY 8.9  --  8.4* 8.0* 8.3* 9.0   MAG  --  1.5*  --   --   --  1.9   PHOS 2.3*  --  3.9  --   --   --    PROTTOTAL 6.3*  --  6.3* 5.5* 6.0* 7.4   ALBUMIN 3.2*  --  3.2* 2.9* 3.0* 3.6   BILITOTAL 1.2  --  1.5* 1.5* 1.0 0.9   ALKPHOS 141*  --  186* 168* 232* 306*   AST 17  --  26 24 34 39   ALT 20  --  24 22 25 31     CBC:   Recent Labs   Lab 10/27/23  0701 10/26/23  0606 10/26/23  0054 10/25/23  1356   WBC 7.6 8.5 9.9 11.6*   RBC 2.81* 3.19* 2.66* 3.07*   HGB 9.3* 10.5* 8.6* 10.1*   HCT 29.4* 33.3* 27.1* 30.8*   * 104* 102* 100   MCH 33.1* 32.9 32.3 32.9   MCHC 31.6 31.5 31.7 32.8   RDW 17.2* 17.3* 17.1* 16.7*   * 143* 131* 142*       INR:  "  Recent Labs   Lab 10/24/23  1033   INR 0.99       Glucose:   Recent Labs   Lab 10/27/23  0701 10/26/23  0606 10/26/23  0054 10/25/23  1356 10/24/23  1033   * 75 81 92 120*       Blood Gas: No lab results found in last 7 days.    Culture Data No results for input(s): \"CULT\" in the last 168 hours.    Virology Data:   Lab Results   Component Value Date    FLUAH1 Not Detected 10/27/2023    FLUAH3 Not Detected 10/27/2023    [id number] Not Detected 10/27/2023    IFLUB Not Detected 10/27/2023    RSVA Not Detected 10/27/2023    RSVB Not Detected 10/27/2023    PIV1 Not Detected 10/27/2023    PIV2 Not Detected 10/27/2023    PIV3 Not Detected 10/27/2023    HMPV Not Detected 10/27/2023    HRVS Negative 01/24/2021    ADVBE Negative 01/24/2021    ADVC Negative 01/24/2021    ADVC Negative 12/23/2020    ADVC Negative 10/07/2019       Historical CMV results (last 3 of prior testing):  Lab Results   Component Value Date    CMVQNT Not Detected 07/11/2023    CMVQNT Not Detected 04/06/2023    CMVQNT <137 (A) 02/09/2023     Lab Results   Component Value Date    CMVLOG 2.0 10/24/2023    CMVLOG 1.9 10/24/2023    CMVLOG 1.9 10/17/2023       Urine Studies    Recent Labs   Lab Test 01/24/21  1729 10/21/19  2240   URINEPH 5.0 5.0   NITRITE Negative Negative   LEUKEST Moderate* Large*   WBCU 34* 115*       Most Recent [place] Pulmonary Function Testing (FVC/FEV1 only)  FVC-Pre   Date Value Ref Range Status   10/24/2023 0.96 L    09/07/2023 1.25 L    08/08/2023 1.35 L    07/11/2023 1.47 L      FVC-%Pred-Pre   Date Value Ref Range Status   10/24/2023 33 %    09/07/2023 42 %    08/08/2023 46 %    07/11/2023 50 %      FEV1-Pre   Date Value Ref Range Status   10/24/2023 0.87 L    09/07/2023 1.07 L    08/08/2023 1.12 L    07/11/2023 1.16 L      FEV1-%Pred-Pre   Date Value Ref Range Status   10/24/2023 37 %    09/07/2023 46 %    08/08/2023 48 %    07/11/2023 49 %        IMAGING    Recent Results (from the past 48 hour(s))   US Lower Extremity " Venous Duplex Bilateral    Narrative    EXAMINATION: DOPPLER VENOUS ULTRASOUND OF BILATERAL LOWER EXTREMITIES,  10/26/2023 2:09 AM     COMPARISON: Lower extremity venous duplex ultrasound 10/6/2019    HISTORY: Rule out PE in ESRD patient with hypotension, tachycardia,  not responding to fluids    TECHNIQUE:  Gray-scale evaluation with compression, spectral flow and  color Doppler assessment of the deep venous system of both legs from  groin to knee, and then at the ankles.    FINDINGS:  In both lower extremities, the common femoral, femoral, popliteal and  posterior tibial veins demonstrate normal compressibility and blood  flow.      Impression    IMPRESSION:  No evidence of deep venous thrombosis in either lower extremity.    I have personally reviewed the examination and initial interpretation  and I agree with the findings.    REJI VIVAS DO         SYSTEM ID:  [id number]   Echocardiogram Complete   Result Value    LVEF  55-60%    LVEF  60-65%    Narrative    [id number]  [id number]  [id number]  [id number]^NOHEMI^GISSEL     [facility]  Echocardiography Laboratory  [address] [place] [id number]     Name: SARAI KILLIAN  MRN: [id number]  : 1960  Study Date: 10/27/2023 08:08 AM  Age: 62 yrs  Gender: Female  Patient Location: [facility]  Reason For Study: Other, Please Specify in Comments  Ordering Physician: GISSEL SONG  Performed By: Kel Cruz     BSA: 1.6 m2  Height: 63 in  Weight: 133 lb  HR: 115  BP: 116/79 mmHg  ______________________________________________________________________________  Procedure  Complete Portable Echo Adult. Contrast Optison. TDS - pt scanned sitting due  to SOB, arrhythmia. Optison (NDC #3753-8337-16) given intravenously. Patient  was given 5 ml mixture of 3 ml Optison and 6 ml saline. 4 ml wasted. The  patient's rhythm is atrial fibrillation.  ______________________________________________________________________________  Interpretation  Summary  The patient's rhythm is atrial fibrillation with RVR.     The visual ejection fraction is 60-65%.  No regional wall motion abnormalities are seen.  Left ventricular size is normal.  Global right ventricular function/size appear normal (RV is more difficult to  visualize).  No significant valvular abnormalities present.  Mild tricuspid insufficiency is present.  The right ventricular systolic pressure is 45mmHg above the right atrial  pressure.  Pulmonary hypertension is present.     This study was compared with the study from 7/26/2022 .Afib RVR and pulm HTN  are new findings.  ______________________________________________________________________________  Left Ventricle  Global and regional left ventricular function is normal with an EF of 55-60%.  Left ventricular size is normal. Mild concentric wall thickening consistent  with left ventricular hypertrophy is present. The visual ejection fraction is  60-65%. Left ventricular diastolic function is not assessable. No regional  wall motion abnormalities are seen.     Right Ventricle  Global right ventricular function is normal. The right ventricle is normal  size.     Atria  Both atria appear normal.     Mitral Valve  The mitral valve is normal.     Aortic Valve  Aortic valve sclerosis is present. On Doppler interrogation, there is no  significant stenosis or regurgitation.     Tricuspid Valve  Mild tricuspid insufficiency is present. Pulmonary hypertension is present.  The right ventricular systolic pressure is 45mmHg above the right atrial  pressure.     Pulmonic Valve  On Doppler interrogation, there is no significant stenosis or regurgitation.     Vessels  The aorta root is normal. The inferior vena cava cannot be assessed. The  thoracic aorta cannot be assessed.     Pericardium  No pericardial effusion is present.     Miscellaneous  No significant valvular abnormalities present.     Compared to Previous Study  This study was compared with the study from  2022 . Afib RVR is new.  ______________________________________________________________________________  MMode/2D Measurements & Calculations  IVSd: 1.0 cm  LVIDd: 4.6 cm  LVIDs: 3.7 cm  LVPWd: 1.2 cm  FS: 19.5 %  LV mass(C)d: 182.6 grams  LV mass(C)dI: 112.3 grams/m2  Ao root diam: 3.3 cm  asc Aorta Diam: 3.2 cm  LVOT diam: 1.9 cm  LVOT area: 2.8 cm2  Ao root diam index Ht(cm/m): 2.1  Ao root diam index BSA (cm/m2): 2.0  Asc Ao diam index BSA (cm/m2): 2.0  Asc Ao diam index Ht(cm/m): 2.0  LA Volume (BP): 53.7 ml     LA Volume Index (BP): 32.9 ml/m2  RWT: 0.51     Doppler Measurements & Calculations  MV E max herberth: 73.4 cm/sec  MV A max herberth: 68.1 cm/sec  MV E/A: 1.1  MV dec slope: 285.3 cm/sec2  MV dec time: 0.27 sec  PA acc time: 0.10 sec  TR max herberth: 336.5 cm/sec  TR max P.3 mmHg  E/E' av.7  Lateral E/e': 6.5  Medial E/e': 8.8     ______________________________________________________________________________  Report approved by: Rebecca CAMPOS 10/27/2023 10:51 AM         XR Chest 2 Views    Narrative    XR CHEST 2 VIEWS  10/27/2023 9:46 AM     HISTORY:  S/p lung transpant, f/u consolidations       COMPARISON:  Chest radiograph 10/25/2023, CT chest 10/24/2023    TECHNIQUE: XR CHEST 2 VIEWS    FINDINGS:   Mild by basilar predominant mixed airspace and interstitial opacities,  with otherwise interval improved aeration compared to prior  radiograph.  Small bilateral pleural effusions. No pneumothorax. Cardiac silhouette  is normal.        Impression    IMPRESSION:  Persistent bibasilar mild mixed interstitial and airspace  opacification, with improved aeration compared to 10/25/2023. Small  bilateral pleural effusions.    I have personally reviewed the examination and initial interpretation  and I agree with the findings.    TERRI ISSA MD         SYSTEM ID:  [id number]   US Upper Extremity Venous Duplex Left    Narrative    EXAMINATION: DOPPLER VENOUS ULTRASOUND OF THE LEFT UPPER  EXTREMITY,  10/27/2023 11:51 AM     COMPARISON: Ultrasound 10/25/2023, CT 10/24/2023    HISTORY: Isolated edema of left upper extremity, assess for DVT     TECHNIQUE:  Gray-scale evaluation with compression, spectral flow and  color Doppler assessment of the deep venous system of the left upper  extremity.    FINDINGS:    Left: Normal blood flow and waveforms are demonstrated in the internal  jugular, innominate, subclavian, and axillary veins. There is normal  compressibility of the brachial, basilic and cephalic veins.    Subcutaneous soft tissue thickening in the distal left upper extremity  may represent subcutaneous edema.    Multiple left cervical lymph nodes incidentally seen, each retaining  fatty marguerite, the largest measures 0.9 cm short axis.      Impression    IMPRESSION:    1. No deep venous thrombosis demonstrated in the left upper extremity.  2. Incidental left cervical lymphadenopathy, possibly reactive,  consider attention on follow-up.  3. Subcutaneous soft tissue thickening in the distal left upper  extremity may represent subcutaneous edema versus cellulitis in the  appropriate clinical settings.    I have personally reviewed the examination and initial interpretation  and I agree with the findings.    EDIE VILLA MD         SYSTEM ID:  [id number]

## 2023-10-27 NOTE — PROGRESS NOTES
Pt arrived from ED around 2030. BP 87/48, O2 94% on 2L, HR irregular up to 136, jumping around a lot, pt asymptomatic. Paged primary team and called rapid response. Gave midodrine, albumin, benadryl, LR bolus. Patient erythema and warm on entire body. Team put in cardiac monitoring orders, patient needed to ne transferred. Gave report to 7C nurse.

## 2023-10-27 NOTE — PROGRESS NOTES
[facility]    Medicine Progress Note - Medicine Service, JOSE TEAM 3    Date of Admission:  10/25/2023    Assessment & Plan      Kecia Blue is a 62 year old female admitted on 10/25/2023. 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    Today:  - L arm US, assess for possible DVT  - change midodrine to 10 mg TID   - change valgancyclovir to 450 mg once daily   - HD schedule during hospitalization changed to Tu, Th, Sat - per dialysis team.   - scheduled nebs for breathing  - Derm consult    #Hypotension  #Arrythmia  Rapid response was called overnight on 10/26 and 10/27 d/t hypotension, tachycardia, and tachypnea. Doppler venous US of bilateral lower extremities on 10/26 with no DVT. PTA metoprolol held. Lactate 1.2. Temperature remains < 100.4* F. Hypotension responsive to albumin and midodrine. EKG 10/27 demonstrating Afib. Bedside cardiac ultrasound  10/27 with collapsible IVC. Main concern on differential remains sepsis 2/2 suspected pulmonary infection, drug reaction, or possible adrenal insufficiency. May consider stress dose steroids in case of drug reaction or possible adrenal insufficiency if becomes hypotensive again. TTE completed 10/27 w/ EF 60-65%. Pulmonary hypertension noted  - Change midodrine to 10 mg TID     #Left arm swelling  Pt's L arm appearing asymmetrically swollen compared to R arm. Notable change from previous days of hospitalization. Pt received HD  yesterday 10/26/23, has AV fistula placement on L arm. However, Pt and Pt's spouse report nothing like this has ever occurred in past after receiving dialysis treatment, or any remote history of similar changes. L arm appears erythematous, but this appears to be consistent with erythema that remains diffuse throughout torso and extremities. Sxs c/f DVT vs manifestation of existing dermatomyositis vs lymphatic/venous changes 2/2 HD treatment day prior in setting of existing AV in L arm  - L arm ultrasound to assess for DVT  - continue monitoring for changes or worsening sxs / vital sign changes    #Cough   #Leukocytosis in immunosuppressed pt  #ILD 2/2 anti-synthetase syndrome s/p B/L lung transplant 3/2018  #Actinomyces (+) BAL  #Hx Stenotrophomonas Pneumonia  #Chronic hypoxic respiratory failure requiring 2L home NC  #CMV viremia  #h/o EBV viremia  Pt with 2month hx of cough. Hx BL lung transplant (2018) c/b right mainstem bronchial stenosis requiring serial treatments with balloon bronchoplasty procedures, most recently on 10/17/23.  CT Chest from 10/24 notable for worsening consolidation BL c/f worsening infection, along with new prominent KONRAD soft tissue thickening. BAL on 10/17 notable actinomyces. Pt also has hx of stenotrophomonas from August of 2023. Given the above, highest suspicion remains pulmonary source of infection. Procalcitonin 10/25/23 elevated at 0.7, suggestive of moderate systemic infection. Completing infectious work-up with UA, Bcx x2. MRSA/MSSA PCR 10/25/23 negative.  Blood cultures collected 10/25/23, pending results. CXR obtained 10/25/23 demonstrating low lung volumes with scattered basilar predominant linear interstitial opacities, particularly at the right lung base., bilateral perihilar airspace opacification, small bilateral pleural effusions, no pneumothorax -  reflective of moderate pulm edema, unable to r/o superimposed infectious process. CrAg, HBV, Respiratory panel PCR 10/26  returned negative. Remaining transplant ID work-up pending results.  -Supplemental O2 PRN to maintain o2 saturations >92%  -Pulmonary transplant team consulted   -c/w azathioprine 50mg qday.   -c/w azithromycin 250mg qday   -c/w tacrolimus. Check level 10/28 @ 6 AM    -c/w prednisone 5mg qday   -c/w Bactrim for PJP ppx  -CMV + EMV levels per transplant pulm  -c/w singulair + advair   -Transplant ID following:  - Change Valgancyclovir dosing to  450 mg daily for 3 weeks, or until x2 negative checks, then return to three times weekly dosing - administer on dialysis days, AFTER Pt has received dialysis   -awaiting results from:  -Urine histoplasma antigen quant  -Sputum culture  -AFB culture   -Nocardia culture  -Fungitell   -Scheduled nebs for breathing  -Abx coverage:  -c/w zosyn (10/25-p) for 2 weeks, may then consider transitioning to augmentin, per ID transplant recs    #Diffuse erythema  #Diffuse pruritus, currently resolved  Pt noted to have diffuse blanchable erythema during initial exam in ED 10/25/23, was very pruritic. Per pt and  this was new 10/25/23 afternoon. No dyspnea, breathing concerns. Pt receiving zosyn infusion at time of interview. C/f allergic reaction- discussed with pharmacy and patient has received all antitiotics (zosyn, vancomycin, minocycline, and augmentin) previously and w/o complication. C/f possible confounding vancomycin infusion reaction/flushing syndrome, however erythema appeared prior to first vanco infusion given 10/25/23 at 1700. Benadryl given yesterday 10/25/23 which alleviated pruritus. On exam today, Pt continues to have diffuse blanchable erythema throughout torso and extremities, no pruritus since receiving benadryl. Etiology remains unclear at this time.  Dermatology consulted   - will appreciate recs   Per derm request:  - Photographs collected and uploaded into media tab in electronic chart  - New medications within last month: valgancyclovir (10/6-p),   acetylcysteine (MUCOMYST) 10 % nebulizer solution (10/5-p)  - Per MAR, vancomycin reported to be administered at 1721 on 10/25. However, ED physician Dr Cobos initial encounter note states Pt was treated with IV vanc and IV zosyn during Dr Cobos' visit.  I called pharmacy immediately upon noticing rash and verified vancomycin had not been administered prior to rash onset.   -Atarax 25mg q6h PRN for itching    #Left Breast Rash:  Pt with diffuse erythema including left breast. Left breast with orange peel skin, increased skin thickness. Per patient this lesion has been present for months, even years and has not changed in appearance. Does not cause her discomfort. No nipple discharge. Per pt appeared after fistula placement. 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 Highest suspicion for lymphatic/venous changes post-fistulogram as cause of these changes, though differential also includes mastitis/cellulitis (though reassuring that area is not warm or w/ inc. Erythema compared to rest of body and is chronic and unchanged), malignancy (last mammogram in 02/2023 and lesion was present and is unchanged since then), or dermatomyositis associated rash.  -Dermatology consulted today 10/27/2023, will appreciate recs when available    #ESRD on HD (M, W, F)  #Macrocytic anemia  #Hx Hyperphosphatemia  Hx of ESRD on HD. Missed dialysis 10/25/23  as she was in the emergency room. Does not appear to be grossly volume overloaded; labs without any emergent abnormalities. Hb 10.5 today, which is at pt's baseline. Suspect anemia of renal disease. Phosphorous level 10/26/23 result wnl at 3.9. Pt received dialysis 10/26/23.  Changing dialysis schedule to Tu, Th, Sat during hospitalization. Will be changed back to M, W, F schedule post discharge.  -c/w HD (M, W, F)  -ESRD service consulted:   -c/w pta vitamins   -Epo/iron per nephrology   -c/w phoslo    -renal panel daily    #Hx Left Sided Aspergillus Empyema:  First noted in 2019. Needle aspiration confirmatory of aspergillus fumigatus, now s/p intrapleural amphotericin bead placement. Discussions had regarding surgery, however, surgery felt to be associated with too high of morbidity, and indefinite posaconazole decided as plan.  -Transplant pulmonology following   - c/w PTA posaconazole    #h/o C diff colitis  Pt appears to have remote hx of c. Diff colitis. Currently without any diarrhea.     Diet: Renal Diet (dialysis)    DVT Prophylaxis: Heparin SQ  Lines: None     Cardiac Monitoring: ACTIVE order. Indication: Tachyarrhythmias, acute (48 hours)  Code Status: Full Code      Disposition Plan      Expected Discharge Date: 10/31/2023      Destination: home with family  Discharge Comments: Dispo: home w/ family, OP HD  Plan: Pending further infectious work-up; sepsis vs. albuterol side-effect vs. Vanco allergy  Progress: albumin w/ midodrine; LUE edema          The patient's care was discussed with the Attending Physician, Dr. Emanuel and Patient.    Richy Jean  Medical Student  Medicine Service, [address]  Securely message with Vocera (more info)  Text page via [employer] Paging/Directory   See signed in provider for up to date coverage information    Resident/Fellow Attestation   I, Keri Tejeda MD, was present with the medical/MARIA A student who participated in the service and in the documentation of the note.  I have verified the history and personally performed the physical exam and medical decision making.  I agree with the assessment and plan of care as documented in the note.      Keri Tejeda MD  PGY3  Date of Service (when I saw the patient): 10/27/23  ______________________________________________________________________    Interval History   Updates/Overnight events:   - Pt received dialysis 10/26/23, will switch HD schedule to  "Tu, Th, Sa while in hospital per dialysis team. Will transition back to M, W, F schedule after discharge.   - patient had another rapid response called overnight d/t hypotension and new onset arrhythmia. Patient reports remaining asymptomatic during response call.   - EKG and bedside cardiac ultrasound performed. EKG demonstrated Afib. Bedside cardiac US demonstrated collapsible IVC. Lactate drawn, result = 1.2     Today's interview:  - Pt reports cough was worse this morning upon waking, but has since been improved. Pt reports feeling rested today.   - Pt denies itchiness as she has been receiving benadryl. Skin remains erythematous but may be slightly improved from yesterday per spouse.   - Pt reports using supplemental O2 at home for comfort purposes. Wishes to continue supplemental O2 for comfort during daytime.   - Pt reports having \"soft\" BM's for past couple of days.     Physical Exam   Vital Signs: Temp: 97.6  F (36.4  C) Temp src: Oral BP: 97/56 Pulse: (!) 121   Resp: 22 SpO2: 97 % O2 Device: Nasal cannula Oxygen Delivery: 2 LPM    General Appearance:  Sitting upright in bed, does not appear to be in significant distress. Awake, alert, oriented to person, place, time.  Respiratory: Coarse breath sounds bilaterally, mildly labored breathing on 2L NC w/ frequent cough.  Cardiovascular: RRR, normal S1 and S2, no additional heart sounds, murmurs, gallops.   GI: Soft, nontender, nondistended. BS+  Skin: Skin appears diffusely erythematous across torso, extremities.   Extremities: L arm asymmetrically swollen (compared to R arm), erythematous symmetric between bilateral UE.     Data   NOTE: Data reviewed over the past 24 hrs contributes toward MDM complexity    "

## 2023-10-27 NOTE — CODE/RAPID RESPONSE
Rapid Response Team Note    Assessment   In assessment a rapid response was called on Kecia Blue due to hypotension and new onset arrhythmia. This presentation is uncertain, suspect 2/2 ongoing sepsis vs albuterol use vs vancomycin hypersensitivity     Plan   -  EGK with sinus tachycardia and frequent PVCs   -give albumin 5% 25g with midodrine   - Primary team to perform bedside cardiac US   - give oral benadryl   -  The Internal Medicine primary team was  at bedside and plan was formulated with them .  -  Disposition: The patient will remain on the current unit. We will continue to monitor this patient closely.  -  Reassessment and plan follow-up will be performed by the primary team    Margo Chase PA-C  [facility] RRT [facility] Job Code Contact #[phone]  [facility] Paging/Directory    Hospital Course   Brief Summary of events leading to rapid response:   Rapid response called for tachycardia and hypotension.  Patient states overall her breathing and cough have improved.  She notes ongoing erythema of her extremities.      Admission Diagnosis:   S/P lung transplant (H) [Z94.2]  Dyspnea, unspecified type [R06.00]  Cellulitis of right breast [N61.0]  Edema of left upper extremity [R60.0]    Physical Exam   Temp: 98.8  F (37.1  C) Temp  Min: 98.1  F (36.7  C)  Max: 99.5  F (37.5  C)  Resp: 24 Resp  Min: 13  Max: 37  SpO2: 94 % SpO2  Min: 62 %  Max: 100 %  Pulse: (!) 130 Pulse  Min: 92  Max: 136    No data recorded  BP: (!) 80/45 (MAP of 58) Systolic (24hrs), Av , Min:71 , Max:120   Diastolic (24hrs), Av, Min:38, Max:97     I/Os: No intake/output data recorded.     Exam:   GENERAL: Alert and oriented x 3. NAD  HEENT: Anicteric sclera. Mucous membranes dry NC. AT.  CV: irregular. S1, S2. No murmurs appreciated.   RESPIRATORY: Effort normal on 2-4Ls Lungs CTAB with no wheezing, rales, rhonchi.   NEUROLOGICAL: No focal deficits. Moves all extremities.    EXTREMITIES: 2+ peripheral edema of upper and lower  extremities   SKIN: diffuse blanching erythema of bilateral upper and lower extremities     Significant Results and Procedures   Lactic Acid:   Recent Labs   Lab Test 10/26/23  0054 10/25/23  1356 10/17/23  1035 09/20/21 2128 05/07/21  1725 03/02/21  1727 02/28/21  0755   LACT 1.1 1.1 0.6   < >  --   --   --    LACTS  --   --   --   --  1.2 0.4* 1.0    < > = values in this interval not displayed.     CBC:   Recent Labs   Lab Test 10/26/23  0606 10/26/23  0054 10/25/23  1356   WBC 8.5 9.9 11.6*   HGB 10.5* 8.6* 10.1*   HCT 33.3* 27.1* 30.8*   * 131* 142*        Sepsis Evaluation   The patient is known to have an infection.  NO EVIDENCE OF SEPSIS at this time.  Vital sign, physical exam, and lab findings are due to tachycardia.

## 2023-10-27 NOTE — PROGRESS NOTES
Pt's status remain the same. SBP remain above 90 and HR remain below 130's. Pt is asymptomatic. Respiratory PCR collected. Pt did not have any cough this shift. She 's aware that we need her sputum and urine for testing.

## 2023-10-27 NOTE — PROGRESS NOTES
Received pt from . Pt is alert and oriented x4, very sleepy and tired. Pt's HR remain irregular and SBP on the 90's. Pt remained asymptomatic. Pt is connected to cardiac monitor.     Admitted/transferred from:   2 RN full   skin assessment completed by Sylvia Benjamin RN and Kacy LANE RN.  Skin assessment finding: other skin intact but has erythema  all over her body and skin is very warm to touch.   Interventions/actions: other MD is aware. Will monitor for now.

## 2023-10-27 NOTE — PLAN OF CARE
Problem: Adult Inpatient Plan of Care  Goal: Plan of Care Review  Description: The Plan of Care Review/Shift note should be completed every shift.  The Outcome Evaluation is a brief statement about your assessment that the patient is improving, declining, or no change.  This information will be displayed automatically on your shift  note.  Flowsheets (Taken 10/27/2023 1973)  Outcome Evaluation:   Continue POC   s/p lung transplant   dyspnea work-up   sepsis vs albuterol effect vs vanco allergy   Pt makes needs known, pleasant demeanor    PTA HD with [facility]  Plan of Care Reviewed With:   patient   spouse  Overall Patient Progress: no change

## 2023-10-27 NOTE — PROVIDER NOTIFICATION
[facility] [facility] (room 226) Patient BP 87/48, O2 94% on 2L, HR irregular up to 136, jumping around a lot, pt asymptomatic. please advise. Lety Beaver RN

## 2023-10-27 NOTE — PLAN OF CARE
Goal Outcome Evaluation:      Plan of Care Reviewed With: patient    Overall Patient Progress: no change    Outcome Evaluation: Pt A&O. VSS. On 2-4L O2 NC. Up w/ SBA. Denies pain. L arm edematous > R arm, US neg for DVT. CT neg for PE. Congested cough, still needs sputum sample. Good appetite. Tele shows afib. No BM this shift. L arm fistula. Generalized rash, derm saw pt. Plan for HD tomorrow at 0740.

## 2023-10-27 NOTE — PROVIDER NOTIFICATION
Provider notified (name): Lisa Ricardo MD   Reason for notification: Change ECG rhythm from sinus tachy with PVC's to Afib.  Recommendation/request given to provider:Pt said she has Hx of Afib. -125. Denies any chest pain. Pt resting and comfortable. BP is improving.   Response from provider If HR consistently stays >130 and SBP <80 Notify provider.

## 2023-10-27 NOTE — CONSULTS
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 Kecia does also have some periorbital erythema/edema (possible heliotrope rash) and possible subtle faint Gottron papules which raise concern for dermatomyositis, however no obvious shawl sign, holster sign, or muscle weakness. Possible subtle 's hands although patient reports her hands are pretty much at their baseline besides the swelling; she does not feel this is like the prior 's hands she had during her dermatomyositis in the past. Differential diagnoses include potential viral exanthems (CMV viremia and history of EBV viremia) as well as drug reactions (although no obvious drug culprits).     Of note, patient with known history of dermatomyositis (anti-Felisa-1 positive, SSA positive, initial onset 2014) with ILD s/p bilateral lung transplant 2018, previously followed by rheumatology (last seen several years ago). Also with known history of sclerodermal features and Raynaud phenomenon concerning for an overlap syndrome. Previously with negative BENJAMÍN (but weak positive 8/2019), anti-centromere, anti-Scl-70, and RNA polymerase III. Previously treated with mycophenolate & rituximab; currently on prednisone, tacrolimus, and azathioprine.    Discussed potential skin biopsy with patient and  at bedside, although the current cutaneous features are subtle and may not result in definitive/diagnostic findings. Also unclear if the skin findings would significantly  at this time from a dermatologic perspective. Patient and  defers biopsy currently, but would appreciate rheumatology evaluation since they haven't seen rheum in several years.     Recommendations:  - Rheumatology consult for further evaluation and treatment considerations; consider repeating autoantibody  "profile if appropriate  - Agree with checking CK and aldolase  - Start triamcinolone 0.1% ointment BID to trunk and extremities  - Start hydrocortisone 2.5% ointment BID for face  - Agree with antihistamines as needed for symptomatic relief    Thank you for the dermatology consultation. Please do not hesitate to contact the dermatology resident/faculty on call for any additional questions or concerns.     Staffed with attending physician, Dr. Ana Fuller MD   Dermatology Resident (PGY-4)    Staff Physician Comments:  I discussed and evaluated the patient with the resident and I agree with the assessment and plan as documented in the resident's note.    Richard Perez MD  Professor  Head of Dermato-Allergy Division  Department of Dermatology  [facility]     Date of Admission: Oct 25, 2023   Encounter Date: 10/27/2023    Reason for Consultation:   \"Hx dermatomyositis + BL Lung txp; 3 days of acute onset blanchanble diffuse erythema. ? drug rxn v. dermatomyositis involvement?\"    History of Present Illness:  Ms. Kecia Blue is a 62 year old female with history of ESRD on HD, dermatomyositis and anti-synthetase syndrome with ILD s/p bilateral lung transplant 3/2018 complicated by multiple infections, who was admitted 10/25/23 with concern for pulmonary infection. Dermatology was consulted for new diffuse erythema noted in the ED on 10/25/23.   - Patient and  first noticed the rash upon arrival to the hospital a couple days ago. The rash was itchy at the time but pruritus has since resolved after Benadryl.   - Face and skin are dry with noticeable scaling today.  and patient feel this has been pretty chronic and relatively baseline for her. Denies scalp involvement  - States hands are swollen, but the loss of fingernails and fingertip erosions are chronic. Reports long history of Raynaud phenomenon. She states the hands otherwise appear like baseline " and does not seem to be like the prior 's hands she had during her past dermatomyositis  - No proximal muscle weakness like she had in the past  - Regarding the increased skin thickness and peau d'orange of her L breast, states this has been chronic for over a year without symptoms or other changes. Reports having a mammogram this year without concerns. Thinks this change is possible related to her fistula placement     Past Medical History:   Reviewed in epic, pertinent findings summarized as above    Social History:  Patient reports that she has never smoked. She has never used smokeless tobacco. She reports that she does not drink alcohol and does not use drugs.    Family History:  Family History   Problem Relation Age of Onset    Hypertension Mother     Arthritis Mother     Pancreatic Cancer Father     Diabetes Father        Medications:  Reviewed in epic, pertinent findings summarized as above     Allergies   Allergen Reactions    Vancomycin Other (See Comments)     Diffuse erythroderma with itching (improved with Benadryl) after receiving vancomycin over 1 hour. Highly suspicious for vancomycin-infusion syndrome. Can use vancomycin in the future, but please give over slower infusion time (at least 2 hours) and premedicate with Benadryl.       Review of Systems:  As per HPI.     Physical exam:  Vitals: BP 91/51 (BP Location: Right arm, Patient Position: Semi-Cobian's, Cuff Size: Adult Regular)   Pulse 90   Temp 98.7  F (37.1  C) (Oral)   Resp 20   LMP 06/07/2014 (Exact Date)   SpO2 99%   GEN: This is a well developed, well-nourished female in no acute distress, in a pleasant mood.    SKIN: Focused examination of the head, neck, face, trunk, and extremities was performed.  - Elkins skin type: II  - Scalp without erythema but there is moderate white flaky scale  - Periorbital region with scaly pink patches. There is also diffuse mild to moderate scaling observed on the face, as well as  trunk/extremities  - Diffuse light pink erythema involving the face down to the legs. No obvious shawl sign or holster sign (poikilodermatous scaly papules/plaques on the upper chest or lateral thighs)  - Possible faint subtle erythematous papules on the dorsal hands overlying the MCP joints. No obvious nailfold capillary changes  - Bilateral finger tips with dry scaly petechial papules. R 1st and 2nd finger with anonychia   - L hand with 2+ pitting edema  - L breast with slightly thickened peau d'orange texture    Laboratory:  Reviewed in epic, pertinent findings summarized as above    Staff Involved:  Resident/Staff

## 2023-10-28 NOTE — PLAN OF CARE
[id number]: Patient with continues with coughing attacks with some relief from Tessolon jeff x1. Did not sleep very well due to c/o being uncomfortable with generalized rash and swelling. All creams applied as ordered. Up to commode with assist of 1. BM x2. Hyptotensive this morning with BP in the 80's/50's. MD notified and came to assess pt. Will administer Midodrine along with morning meds prior to leaving for Diaylsis this morning.     Goal Outcome Evaluation:      Plan of Care Reviewed With: patient    Overall Patient Progress: no changeOverall Patient Progress: no change    Outcome Evaluation: Continues with cough, generalized rash and tele showing a-fib

## 2023-10-28 NOTE — PLAN OF CARE
Goal Outcome Evaluation:      Plan of Care Reviewed With: patient, spouse    Overall Patient Progress: no change     Outcome Evaluation: Pt A&O. VSS on 2-4L O2 NC. Up w/ A1 to BS commode. Denies pain. Frequent congested cough, worse today, prn tessalon given. Had HD this AM. Ring removed from finger by team. Abd & chest CT and upper/lower extremity US done. Rheumatology saw pt. Had 1 BM this shift. Triggered sepsis, lactic 0.7. K 3.2, MD aware. Phosp 1.1, replaced w/ PO and IV phosp. Will cont to monitor.

## 2023-10-28 NOTE — PROGRESS NOTES
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   Presents to ED on 10/25/2023 with worsening cough of 2 month c/f pulmonary infection.       NUTRITION HISTORY  Unable to obtain. Patient off floor in HD       CURRENT NUTRITION ORDERS  Diet: Dialysis  Intake/Tolerance: Good appetite, consuming 75% meals       LABS  Na+: 127 (L)  K+: 3.2 (L),   BUN: 27.7 (H), Cr: 4.02 (H), GFR: 12 -> On HD  K+:3.2 (L), Phos: 1.9 (L) -> phos binders on hold   Glucose: 95      MEDICATIONS  Medications reviewed      ANTHROPOMETRICS  Height: 0 cm (Data Unavailable) 160 cm   Most Recent Weight: N/A  ( Post HD Wt: 60.3 kg  from today's note)     IBW: 52.3 kg ( 115% IBW as compared to most recent wt of 60.3 kg on 10/24)  BMI: 23.5 kg /m2 Normal BMI  Weight History: EDW N/A   Wt Readings from Last 10 Encounters:   10/24/23 60.3 kg (133 lb)   10/17/23 60 kg (132 lb 4.4 oz)   09/07/23 61.1 kg (134 lb 11.2 oz)   08/22/23 60.2 kg (132 lb 11.2 oz)   08/22/23 60.7 kg (133 lb 14.4 oz)   09/27/22 58.1 kg (128 lb)   07/26/22 58.7 kg (129 lb 6.4 oz)   05/26/22 58.2 kg (128 lb 4.9 oz)   05/26/22 58.5 kg (129 lb)   03/08/22 57.2 kg (126 lb)       Dosing Weight: 60 kg ( post HD wt from today 10/28)     ASSESSED NUTRITION  NEEDS  Estimated Energy Needs: 1500- 1800 kcals/day (25 - 30 kcals/kg)  Justification: Maintenance  Estimated Protein Needs: 72- 90 grams protein/day (1.2 - 1.5 grams of pro/kg)  Justification: Dialysis  Estimated Fluid Needs:HD  Justification: Per provider pending fluid status    PHYSICAL FINDINGS  See malnutrition section below.    MALNUTRITION  % Intake: decrease intake does not meet criteria  % Weight Loss: None noted  Subcutaneous Fat Loss: Unable to assess  Muscle Loss: Unable to assess  Fluid Accumulation/Edema: None noted  Malnutrition Diagnosis: Unable to determine due to patient was off floor in HD       NUTRITION DIAGNOSIS  Increased kcal/protein needs related to infection / respiratory status/ HD repletion as evidenced by estimated need above     INTERVENTIONS  Implementation  Nutrition Education: No education needs assessed at this time   Medical food supplement therapy       Goals  Patient to consume % of nutritionally adequate meal trays TID, or the equivalent with supplements/snacks.       Monitoring/Evaluation  Progress toward goals will be monitored and evaluated per protocol.  Natan Pearson RD/STEPHANY  7C ([phone])/7D RD pager: [phone]  Weekend/Holiday RD pager: [phone]

## 2023-10-28 NOTE — PROGRESS NOTES
HEMODIALYSIS TREATMENT NOTE    Date: 10/28/2023  Time: 12:17 PM    Data:  Pre Wt: 60.3 kg    Desired Wt: 60.3 kg   Post Wt: 60.3 kg    Weight change:  0 kg  Ultrafiltration - Post Run Net Total Removed (mL): 0 mL  Vascular Access Status: Fistula  patent  Dialyzer Rinse: Streaked  Total Blood Volume Processed: 80.31 L   Total Dialysis (Treatment) Time: 3.5   Dialysate Bath: K 4, Ca 3  Heparin: None    Lab:   No    Interventions:  Pt had stable HD for 3.5 hrs via LUE AVF. Dialysis circuit was primed with 5% albumin for BP support. No fluid removed as per order. Left arm is very swollen and Dr. Lawton aware. No Medication r/t HD given. 400 BFR achieved with good pressure. Hemostasis obtained within 10 min. Handoff report given to LOUISE Becker.    Assessment:  A&Ox4  VSS  Calm and cooperative  On 4L O2 NC  Denied of pain  AVF +T/B     Plan:    Next HD per renal

## 2023-10-28 NOTE — CONSULTS
Rheumatology Consult Note - Fellow    Kecia Blue MRN# [id number]   Age: 62 year old YOB: 1960     Date of Admission: 10/25/2023    Reason for consult: ? Dermatomyositis flare    Requesting Physician: Dr. 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 She was found to have progressive pulmonary infiltrates with increased oxygen needs and shortly after arrival to the hospital she developed an acute onset erythematous skin rash with extremity swelling that continues to worsen. Rheumatology has been consulted to determine if this is an acute dermatomyositis flare and if immunosuppressive therapy should be started.     Impression is that it is unlikely to be an acute flare of dermatomyositis given acuity and temporal relation to antibiotic administration,  no weakness, normal CK (previously elevated CK and aldolase) and rash is not typical of dermatomyositis. Corticosteroids however would be helpful to assist with a more widespread exanthem such as hers.    DIAGNOSIS:  1. Dermatomyositis, anti-synthetase syndrome (+Jo1, + SSA/SSB) with ILD s/p bilateral lung transplant in 03/2018  2. Chronic cough with concern for post-obstructive PNA, treating with piperacillin-tazobactam  3. Acute onset generalized erythematous, pruritic skin rash - likely drug reaction.  4. Edema of extremities greater in LUE and LLE, less pronounced in RLE    PLAN:  -- Discussed with transplant ID team, okay with short prednisone burst which we would recommend for management of her rash: 20 mg x 3d, 15 mg x 3d, 10 mg x 3d then return to 5 mg/d.  -- We will obtain serologies (myositis panel,  Felisa-1, scl-70, RNA tracy III, pm scl 100, RNP, RF/CCP, dsDNA, C3/C4, EVELIN and CRP/ESR and await aldolase) for thoroughness.     I discussed the findings and recommendations with the patient.  I communicated the assessment and plan to the consulting team.    Case seen and discussed with Dr. Galvan.     Thank you Kuldip Emanuel MD for involving Rheumatology in the care of this patient. Please contact us if there are any questions.     Killian Clayton,   Rheumatology Fellow  PGY4  Pager: [phone]    Staff addendum  I performed the history and physical examination of the patient and discussed the management with the fellow. I reviewed the available lab and imaging studies. I reviewed the fellow's note and agree with the documented findings and plan of care.    Rikki Galvan MD  Rheumatology      HISTORY OF PRESENT ILLNESS     Kecia Blue is a 62 year old woman with PMHx as above that presents with chronic productive cough of 2 months duration. She recently had balloon bronchoplasty on 10/17/23 and cough improved after this but then returned. Her dyspnea has worsened as well as malaise. She did not have any skin rashes that developed prior to hospital stay but it was noted after admission that she had diffuse pruritus as well as diffuse erythema involving extremities and trunk. This was after starting antibiotics for pneumonia.     Patient is seen today with her  at bedside. Main concern for her right now is her skin rash and swelling in her extremities. This started only after admission to the hospital and after receiving antibiotics. She has not had an indolent onset of any skin rash. Regarding her dermatomyositis she feels this is not bothering her right now. She and  have not noted any worsening of gottron papules, 's hands or other characteristic rashes a/w DM. Likewise, her strength is good in her extremities and she does not have any issues with raising her arms  above her head to comb her hair or reach in a cupboard nor any issues with walking up stairs or standing up from the toilet. She continues to have Raynaud's. She otherwise denies other concerns at this time.     Rheum History  - Diagnosed with DM in 2014: dyspnea with GGO, finger swelling, Gottron papules, 's hand rash, Raynaud's, proximal mm weakness. MRI did not reveal significant myositis and no biopsy performed thus treated empirically with corticosteroids, mycophenolate for 3 months (ineffective as muscle markers remained elevated), azathioprine (02/2015 started - dose reduced d/t low TPMT). Steroid taper started in 07/2015. Skin findings persisted and azathioprine dose increased to 150 mg daily but with steroid tapering dyspnea resulted. Saw [facility] in 03/2016 and considered failure of mycophenolate thus got single course of Rituximab. Skin and muscle weakness had improved but breathing didn't. There was c/f pulmonary HTN with DLCO 19% and tacrolimus started together with azathioprine/prednisone. In 09/2017 CT chest showed NSIP and fibrotic changes though not much changed from prior imaging. In 01/2018 she had severe progressive ILD and underwent transplant in 02/2018.    Serology  Positive: strongly positive BENJAMÍN, Felisa-1 66, SSA 2.8, SSB 7.3. Elevated CK in past at 411, elevated aldolase 18.6.  Negative/Normal: RNA tracy III, RNP, Sm, scl-70, centromere, complements, RF, CCP, MPO, MI-3     Treatment History  - Corticosteroids  - Mycophenolate mofetil  - Azathioprine  - One course of Rituximab  - Tacrolimus    HISTORY REVIEW:  Past Medical History:   Diagnosis Date    Acute on chronic respiratory failure with hypoxia (H) 02/21/2018    Anisocoria     Antisynthetase syndrome (H24) 2014    Anxiety     Aspergillus (H)     Aspergillus pneumonia (H) 11/20/2020    Benign essential hypertension     C. difficile colitis     Cytomegalovirus (CMV) viremia (H)     Dermatomyositis (H)     Dysplasia of cervix, low  grade (ESTRADA 1)     EBV (Franklin-Barr virus) viremia     ESRD (end stage renal disease) on dialysis (H)     ILD (interstitial lung disease) (H)     Lung replaced by transplant (H)     Osteopenia     Paroxysmal atrial fibrillation (H)     Pneumocystis jiroveci pneumonia (H)     PONV (postoperative nausea and vomiting)     Post-menopause     Pulmonary hypertension (H)     Raynaud's disease     Seronegative rheumatoid arthritis (H)      Past Surgical History:   Procedure Laterality Date    BRONCHOSCOPY (RIGID OR FLEXIBLE), DIAGNOSTIC N/A 4/10/2018    Procedure: COMBINED BRONCHOSCOPY (RIGID OR FLEXIBLE), LAVAGE;;  Surgeon: Mariposa Donohue MD;  [facility]    BRONCHOSCOPY (RIGID OR FLEXIBLE), DIAGNOSTIC N/A 12/23/2020    Procedure: BRONCHOSCOPY, WITH BRONCHOALVEOLAR LAVAGE;  Surgeon: Uri Bass MD;  [facility]: [facility]    BRONCHOSCOPY (RIGID OR FLEXIBLE), DIAGNOSTIC N/A 5/26/2022    Procedure: BRONCHOSCOPY, WITH BRONCHOALVEOLAR LAVAGE;  Surgeon: Uri Bass MD;  [facility]: [facility]    BRONCHOSCOPY (RIGID OR FLEXIBLE), DIAGNOSTIC N/A 8/17/2023    Procedure: BRONCHOSCOPY, WITH BRONCHOALVEOLAR LAVAGE;  Surgeon: Esau Bruno MD;  [facility]: [facility]    BRONCHOSCOPY (RIGID OR FLEXIBLE), DILATE BRONCHUS / TRACHEA N/A 10/11/2018    Procedure: BRONCHOSCOPY (RIGID OR FLEXIBLE), DILATE BRONCHUS / TRACHEA;  Flexible And Rigid Bronchoscopy and Dilation;  Surgeon: Wilber Lin MD;  [facility]    BRONCHOSCOPY FLEXIBLE N/A 3/13/2018    Procedure: BRONCHOSCOPY FLEXIBLE;  Flexible Bronchoscopy ;  Surgeon: Gissell Sanchez MD;  [facility]    BRONCHOSCOPY FLEXIBLE N/A 5/9/2018    Procedure: BRONCHOSCOPY FLEXIBLE;;  Surgeon: Wilber Lin MD;  [facility]    BRONCHOSCOPY FLEXIBLE AND RIGID N/A 9/10/2018    Procedure: BRONCHOSCOPY FLEXIBLE AND RIGID;  Flexible and Rigid Bronchoscopy with Balloon Dilation, tissue debulking with cryo, and Right mainstem bronchus stent placement;  Surgeon:  Wilber Lin MD;  [facility]    BRONCHOSCOPY RIGID N/A 6/6/2018    Procedure: BRONCHOSCOPY RIGID;;  Surgeon: Lopez Macias MD;  [facility]    BRONCHOSCOPY, DILATE BRONCHUS, STENT BRONCHUS, COMBINED N/A 6/11/2018    Procedure: COMBINED BRONCHOSCOPY, DILATE BRONCHUS, STENT BRONCHUS;  Flexible Bronchoscopy, Balloon Dilation, Bronchial Washings;  Surgeon: Wilber Lin MD;  [facility]    BRONCHOSCOPY, DILATE BRONCHUS, STENT BRONCHUS, COMBINED Right 7/10/2018    Procedure: COMBINED BRONCHOSCOPY, DILATE BRONCHUS, STENT BRONCHUS;  Flexible Bronchoscopy, Balloon Dilation, Bronchial Washings  ;  Surgeon: Wilber Lin MD;  [facility]    BRONCHOSCOPY, DILATE BRONCHUS, STENT BRONCHUS, COMBINED N/A 8/2/2018    Procedure: COMBINED BRONCHOSCOPY, DILATE BRONCHUS, STENT BRONCHUS;  Flexible Bronchoscopy, Bronchial Washings, Balloon Dilation;  Surgeon: Wilber Lin MD;  [facility]    BRONCHOSCOPY, DILATE BRONCHUS, STENT BRONCHUS, COMBINED N/A 8/20/2018    Procedure: COMBINED BRONCHOSCOPY, DILATE BRONCHUS, STENT BRONCHUS;  Flexible Bronchoscopy, Balloon Dilation;  Surgeon: Wilber Lin MD;  [facility]    BRONCHOSCOPY, DILATE BRONCHUS, STENT BRONCHUS, COMBINED N/A 10/29/2018    Procedure: Flexible Bronchoscopy, Balloon Dilation, Stent Revision, Airway Examination And Therapeutic Suctioning, Cyro Tumor Debulking;  Surgeon: Wilber Lin MD;  [facility]    BRONCHOSCOPY, DILATE BRONCHUS, STENT BRONCHUS, COMBINED N/A 11/20/2018    Procedure: Rigid Bronchoscopy, Stent Removal and dilitation;  Surgeon: Wilber Lin MD;  [facility]    BRONCHOSCOPY, DILATE BRONCHUS, STENT BRONCHUS, COMBINED N/A 12/14/2018    Procedure: Flexible And Rigid Bronchoscopy, Balloon Dilation, Bronchial Washing;  Surgeon: Wilber Lin MD;  [facility]    BRONCHOSCOPY, DILATE BRONCHUS, STENT BRONCHUS, COMBINED N/A 1/17/2019    Procedure:  Flexible And Rigid Bronchoscopy, Balloon Dilation.;  Surgeon: Wilber Lin MD;  [facility]    BRONCHOSCOPY, DILATE BRONCHUS, STENT BRONCHUS, COMBINED N/A 3/7/2019    Procedure: Flexible and Rigid Bronchoscopy, Bronchial Washing, Balloon Dilation;  Surgeon: Wilber Lin MD;  [facility]    BRONCHOSCOPY, DILATE BRONCHUS, STENT BRONCHUS, COMBINED N/A 6/6/2019    Procedure: Rigid and Flexible Bronchoscopy, Balloon Dilation;  Surgeon: Wilber Lin MD;  [facility]    BRONCHOSCOPY, DILATE BRONCHUS, STENT BRONCHUS, COMBINED N/A 10/11/2019    Procedure: Flexible and Rigid Bronchoscopy, Balloon Dilation, Bronchoalveolar Lagave;  Surgeon: Wilber Lin MD;  [facility]    BRONCHOSCOPY, DILATE BRONCHUS, STENT BRONCHUS, COMBINED N/A 2/19/2021    Procedure: BRONCHOSCOPY, flexible, airway dilation, and bronchial wash;  Surgeon: Wilber Lin MD;  [facility]    BRONCHOSCOPY, DILATE BRONCHUS, STENT BRONCHUS, COMBINED N/A 4/9/2021    Procedure: BRONCHOSCOPY, flexible and rigid, Airway suctioning;  Surgeon: Mati Norris MD;  [facility]    BRONCHOSCOPY, DILATE BRONCHUS, STENT BRONCHUS, COMBINED N/A 10/17/2023    Procedure: RIGID, flexible bronchoscopy with airway dilation;  Surgeon: Preet Patel MD;  [facility]    CV RIGHT HEART CATH MEASUREMENTS RECORDED N/A 3/10/2020    Procedure: CV RIGHT HEART CATH;  Surgeon: Wai Garcia MD;  [facility]    ENT SURGERY      tonsillectomy as a child    ESOPHAGOSCOPY, GASTROSCOPY, DUODENOSCOPY (EGD), COMBINED N/A 10/29/2018    Procedure: COMBINED ESOPHAGOSCOPY, GASTROSCOPY, DUODENOSCOPY (EGD) with biopsies and polypectomy;  Surgeon: Chente Bloom MD;  [facility]    INSERT EXTRACORPORAL MEMBRANE OXYGENATOR ADULT (OUTSIDE OR) N/A 2/27/2018    Procedure: INSERT EXTRACORPORAL MEMBRANE OXYGENATOR ADULT (OUTSIDE OR);  INSERT EXTRACORPORAL MEMBRANE OXYGENATOR ADULT  (OUTSIDE OR) ;  Surgeon: Toby Hernandez MD;  [facility]    IR CVC TUNNEL PLACEMENT > 5 YRS OF AGE  10/25/2019    IR DIALYSIS FISTULOGRAM LEFT  3/2/2021    IR DIALYSIS MECH THROMB, PTA  3/2/2021    IR FLUORO 0-1 HOUR  5/7/2021    IR GASTRO JEJUNOSTOMY TUBE PLACEMENT  2/16/2021    IR PARACENTESIS  1/8/2020    IR THORACENTESIS  9/13/2019    IR TRANSCATHETER BIOPSY  1/8/2020    LASER CO2 BRONCHOSCOPY N/A 4/30/2021    Procedure: Flexible and Rigid Bronchoscopy and Tissue Debulking with CO2 Laser Assistance;  Surgeon: Mati Norris MD;  [facility]    LASER CO2 BRONCHOSCOPY N/A 6/11/2021    Procedure: BRONCHOSCOPY, Flexible and Rigid Bronchoscopy, Tissue Debulking with cryo Assistance, airway dilation,;  Surgeon: Mati Norris MD;  [facility]    LASER CO2 BRONCHOSCOPY N/A 9/16/2021    Procedure: BRONCHOSCOPY, flexible and rigid, CO2 Laser Debulking;  Surgeon: Mati Norris MD;  [facility]    LASER CO2 BRONCHOSCOPY N/A 2/11/2022    Procedure: flexible, rigid bronchoscopy, tissue debulking, airway dilation, co2 laser, bronchoalveolar lavage;  Surgeon: Juana Baugh MD;  [facility]    no prior surgery      REMOVE EXTRACORPORAL MEMBRANE OXYGENATOR ADULT N/A 3/3/2018    Procedure: REMOVE EXTRACORPORAL MEMBRANE OXYGENATOR ADULT;  Removal of Right Femoral Venous and Right Axillary Arterial Extracorporeal Membrane Oxygenator;  Surgeon: Toby Hernandez MD;  [facility]    TRANSPLANT LUNG RECIPIENT SINGLE X2 Bilateral 3/1/2018    Procedure: TRANSPLANT LUNG RECIPIENT SINGLE X2;  Median Sternotomy, Extracorporeal Membrane Oxygenator, bilateral sequential lung transplant;  Surgeon: Toby Hernandez MD;  [facility]     Family History   Problem Relation Age of Onset    Hypertension Mother     Arthritis Mother     Pancreatic Cancer Father     Diabetes Father      Social History     Socioeconomic History    Marital status:      Spouse name: Not on file     Number of children: Not on file    Years of education: Not on file    Highest education level: Not on file   Occupational History    Not on file   Tobacco Use    Smoking status: Never    Smokeless tobacco: Never   Substance and Sexual Activity    Alcohol use: No     Alcohol/week: 1.0 standard drink of alcohol     Types: 1 Glasses of wine per week    Drug use: No    Sexual activity: Not on file   Other Topics Concern    Parent/sibling w/ CABG, MI or angioplasty before 65F 55M? No   Social History Narrative    3/6/2019 - Lives with . Has three daughters. Four grandchildren (two ). No pets. Travelled previously to [facility]. Has visited [place] several times.      Social Determinants of Health     Financial Resource Strain: Not on file   Food Insecurity: Not on file   Transportation Needs: Not on file   Physical Activity: Not on file   Stress: Not on file   Social Connections: Not on file   Interpersonal Safety: Not on file   Housing Stability: Not on file     Patient Active Problem List   Diagnosis    ILD (interstitial lung disease) (H)    S/P lung transplant (H)    Steroid-induced hyperglycemia    Deep vein thrombosis (DVT) (H) [I82.409]    Encounter for long-term (current) use of high-risk medication    Dehydration    Acute kidney injury (H24)    Cytomegalovirus (CMV) viremia (H)    Nausea and vomiting    Low hemoglobin    Cholecystitis, unspecified    Empyema of lung (H)    Administration of long-term prophylactic antibiotics    RADHA (acute kidney injury) (H24)    Shortness of breath    Pulmonary hypertension (H)    Hemodialysis patient (H24)    Respiratory failure (H)    End stage renal failure on dialysis (H)    Stenosis of right bronchus    Pneumonia due to Pneumocystis jirovecii (H)    Episodic anisocoria    Loculated pleural effusion    Postobstructive pneumonia    Lung transplant status, bilateral (H)    Aspergillus pneumonia (H)    EBV (Franklin-Barr virus) viremia    Pre-diabetes     History of Pneumocystis jirovecii pneumonia    Immunosuppressed status (H24)    Current chronic use of systemic steroids    Age-related osteoporosis without current pathological fracture    ESRD (end stage renal disease) on dialysis (H)    Chronic kidney disease, unspecified    Adverse effect of other drugs, medicaments and biological substances, sequela    Peripheral neuropathy    Other specified disorders of bone density and structure, multiple sites    Other specified disorders involving the immune mechanism, not elsewhere classified (H24)    Abnormal level of blood mineral    Anemia in chronic kidney disease    Antisynthetase syndrome (H24)    BMI 20.0-20.9, adult    CMV (cytomegalovirus) antibody positive    Dermatomyositis (H)    Dermatopolymyositis, unspecified, organ involvement unspecified (H)    Dysplasia of cervix, low grade (ESTRADA 1)    Family history of diabetes mellitus type II    GERD (gastroesophageal reflux disease)    Gottron's papules    Current use of steroid medication    High risk medications (not anticoagulants) long-term use    History of total knee replacement    Iron deficiency anemia secondary to inadequate dietary iron intake    Long term (current) use of antibiotics    Low magnesium level    Osteopenia of multiple sites    Other diseases of bronchus, not elsewhere classified    Other disorders of plasma-protein metabolism, not elsewhere classified    Other specified complication of vascular prosthetic devices, implants and grafts, initial encounter (H24)    Presence of right artificial knee joint    Primary osteoarthritis of left knee    Primary osteoarthritis of right knee    Pulmonary hypertension due to interstitial lung disease (H)    Secondary hyperparathyroidism of renal origin (H24)    Seronegative rheumatoid arthritis (H)    Steal syndrome of dialysis vascular access (H24)    Cytomegaloviral disease, unspecified (H)    End stage renal disease (H)    Dependence on renal dialysis  (H24)    Lung interstitial disease (H)    Other specified abnormal immunological findings in serum    Dyspnea, unspecified type    Edema of left upper extremity    Actinomyces infection     Allergies   Allergen Reactions    Vancomycin Other (See Comments)     Diffuse erythroderma with itching (improved with Benadryl) after receiving vancomycin over 1 hour. 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 Can use vancomycin in the future, but please give over slower infusion time (at least 2 hours) and premedicate with Benadryl.       I have reviewed the patients past medical history, past surgical history, current medications, current allergies, family history and social history.     ROS     A 14 point ROS was performed with pertinent findings listed above.      OBJECTIVE     PHYSICAL EXAM  /61   Pulse 101   Temp 97.7  F (36.5  C) (Oral)   Resp 21   LMP 06/07/2014 (Exact Date)   SpO2 (!) 82%   Wt Readings from Last 4 Encounters:   10/24/23 60.3 kg (133 lb)   10/17/23 60 kg (132 lb 4.4 oz)   09/07/23 61.1 kg (134 lb 11.2 oz)   08/22/23 60.2 kg (132 lb 11.2 oz)     Constitutional: Chronically ill-appearing  Eyes: nl EOM, conjunctiva, sclera  ENT: nl external ears, nose  Resp: lungs coarse greater in bilateral bases nl to palpation  CV: RRR, no m/r/g. LUE > RUE edema. LLE > RLE edema.  MS: All TMJ, neck, shoulder, elbow, wrist, MCP/PIP/DIP, spine, hip, knee, ankle, and foot MTP/IP joints were examined and  found normal. No active synovitis or deformity. Diffuse subcutaneous pitting edema especially in left hand with ring still on and very restrictive. Full ROM.  Normal  strength.  Skin: Some missing nails. Distal finger wounds. 's hands. No sclerodactyly but skin is difficult to tent 2/2 edema. No calcinosis. No shawl sign or V sign. Diffuse erythematous rash over extremities and trunk as well as face with periorbital erythema. No  "alopecia or nodules.  Neuro: grossly nl cranial nerves and sensation. MM strength 5/5 with shoulder abd/add, elbow f/e,  strength, hip flex is 4/5, knee f/e 5/5 and dorsi/plantar flexion 5/5.  Psych: nl judgement, orientation, memory, affect.    INVESTIGATIONS:  Outside Records: YES    CBC:  Recent Labs   Lab Test 10/28/23  0725 10/27/23  0701 10/26/23  0606   WBC 6.1 7.6 8.5   RBC 2.49* 2.81* 3.19*   HGB 8.2* 9.3* 10.5*   HCT 25.5* 29.4* 33.3*   * 105* 104*   MCH 32.9 33.1* 32.9   MCHC 32.2 31.6 31.5   RDW 17.3* 17.2* 17.3*    149* 143*       BMP:  Recent Labs   Lab Test 10/28/23  0725 10/27/23  0701 10/26/23  0606   * 133* 133*   POTASSIUM 3.2* 3.5 4.5   CHLORIDE 92* 95* 94*   CO2 21* 26 24   ANIONGAP 14 12 15   GLC 95 100* 75   BUN 27.7* 16.0 31.7*   CR 4.02* 3.00* 5.61*   GFRESTIMATED 12* 17* 8*   CHELSEY 8.7* 8.9 8.4*       LFT:  Recent Labs   Lab Test 10/28/23  0725 10/27/23  0701 10/26/23  0606   PROTTOTAL 5.5* 6.3* 6.3*   ALBUMIN 2.7* 3.2* 3.2*   BILITOTAL 0.9 1.2 1.5*   ALKPHOS 107* 141* 186*   AST 12 17 26   ALT 16 20 24       No results found for: \"CKTOTAL\"  TSH   Date Value Ref Range Status   07/11/2023 1.23 0.30 - 4.20 uIU/mL Final   08/01/2018 1.80 0.40 - 4.00 mU/L Final     No results found for: \"URIC\"    Inflammatory markers  Lab Results   Component Value Date    CRP 25.0 10/06/2019    CRP 58.0 09/13/2019    CRP 66.0 09/11/2019     Lab Results   Component Value Date    SED 58 10/28/2023    SED 93 10/07/2019     09/13/2019     09/11/2019     Ferritin   Date Value Ref Range Status   12/13/2018 302 (H) 8 - 252 ng/mL Final   08/01/2018 571 (H) 8 - 252 ng/mL Final       UA RESULTS:  Recent Labs   Lab Test 01/24/21  1729 10/21/19  2240 09/12/19  0125   COLOR Yellow Yellow Light Yellow   APPEARANCE Slightly Cloudy Cloudy Clear   URINEGLC Negative Negative Negative   URINEBILI Negative Negative Negative   URINEKETONE 5* Negative 10*   SG 1.023 1.018 1.008   UBLD Small* " "Trace* Negative   URINEPH 5.0 5.0 7.5*   PROTEIN 30* 30* 30*   NITRITE Negative Negative Negative   LEUKEST Moderate* Large* Negative   RBCU 26* 25* <1   WBCU 34* 115* 3         AUTOIMMUNITY LABS     No results found for: \"RHF\"  No results found for: \"CCPIGG\"  No results found for: \"ANCA\"  Lab Results   Component Value Date    Q7MXQTE 113 05/16/2018     No results found for: \"M2LZWMU\"  No results found for: \"JUD\"  No results found for: \"DNA\"  Lab Results   Component Value Date    RNPIGG 0.2 05/16/2018    SMIGG <0.2 05/16/2018    SSAIGG 2.8 (H) 05/16/2018    SSBIGG 7.3 (H) 05/16/2018    SCLIGG <0.2 05/16/2018       IMAGING:  CT chest 10/24/23  IMPRESSION: Worsening consolidation bilaterally concerning for  worsening infection. New prominent left upper lobe anterior  pleural-based soft tissue thickening. Recommend follow to clearing.  Neoplasm somewhat less likely given the short interval but cannot be  entirely excluded. Mild air trapping.  Ectasia of the mid ascending thoracic aorta.  Left breast skin thickening and edema in the breast tissues again  present. Rim calcified almost certainly benign density at the left  lung base. Trace pleural effusions bilaterally.  Unchanged osteopenic T5 compression deformity.  Atherosclerosis.    CT chest 10/27/23  IMPRESSION:  1. No CT evidence of acute pulmonary embolus.  2. Decreased edema in the lungs with continued small pleural  effusions. Differential could be improving atypical infection.  3. Bilateral lung transplantation.  4. Cardiomegaly.  5. Left breast skin thickening and breast edema of uncertain etiology.    Upper and lower extremity US negative for DVTs.  "

## 2023-10-28 NOTE — PROGRESS NOTES
Hemodialysis Progress Note    I personally reviewed the vital signs, medications, labs, and imaging.  Subjective: I examined the patient during dialysis. Tolerating iHD session without issues. No UF planned for today. Bp's okay in the low 100's at the time of my eval. Na down to 127 this AM. Will improve with iHD but needs to reduce hypotonic fluid intake. K 3.2. Will dialyize against 4K bath. May need repletion if continues to trend donw.     Objective:  /61   Pulse 101   Temp 97.7  F (36.5  C) (Oral)   Resp 21   LMP 06/07/2014 (Exact Date)   SpO2 (!) 82%   No intake or output data in the 24 hours ending 10/28/23 0958      Problem list & Plan  # ESKD on Dialysis MWF  -Diagnosis: ESKD on iHD  -Continue TThSat iHD while in house for now. Eventually transition back to MWF  -Left AVG  --+ thrill  + bruit    # MBD  - Calcium 8.7, Phos 1.9 On PhosLo 667mg w/ meals. Holding PHosLo. Please update a phos this afternoon and give IV or PO repletion if remains low.     # Anemia of Renal Disease  - Hemoglobin 8.2. EPO 4k with iHD sessions    Marcio Lawton MD  Date of Service (when I saw the patient): October 28, 2023

## 2023-10-28 NOTE — PHARMACY-ADMISSION MEDICATION HISTORY
Pharmacy Intern Admission Medication History    Admission medication history is complete. The information provided in this note is only as accurate as the sources available at the time of the update.    Information Source(s): Patient and CareEverywhere/SureScripts via phone    Pertinent Information:   Patient was taking prn nebs every day the last week leading up to admission    Changes made to PTA medication list:  Added: None  Deleted:   Budesonide 0.5mg/2mL neb solution - take 2 mls by nebulization daily as needed - no longer takes  Lidocaine-prilocaine 2.5-2.5 external cream - no sig - no longer uses  Changed:   Cholecalciferol 50 mcg capsules - take 50 mcg -> take 1 capsule by mouth on dialysis days    Medication Affordability:       Allergies reviewed with patient and updates made in EHR: yes    Medication History Completed By: Keri Grene 10/28/2023 3:32 PM    Prior to Admission medications    Medication Sig Last Dose Taking? Auth Provider Long Term End Date   acetaminophen (TYLENOL) 325 MG tablet Take 1 tablet (325 mg) by mouth every 4 hours as needed for mild pain or fever Past Week Yes Leelee Huang MD     acetylcysteine (MUCOMYST) 10 % nebulizer solution Inhale 4 mLs into the lungs 2 times daily as needed for mucolysis/respiratory distress 10/24/2023 Yes Ame Chow PA-C     albuterol (PROVENTIL) (2.5 MG/3ML) 0.083% neb solution Take 1 vial (2.5 mg) by nebulization every 6 hours as needed for shortness of breath, wheezing or cough 10/24/2023 Yes Ame Chow PA-C Yes    azaTHIOprine (IMURAN) 50 MG tablet Take 1.5 tablets (75 mg) by mouth daily 10/24/2023 Yes Ame Chow PA-C Yes    azithromycin (ZITHROMAX) 250 MG tablet Take 250 mg by mouth daily 10/24/2023 Yes Unknown, Entered By History     calcium acetate (PHOSLO) 667 MG CAPS capsule Take 1 capsule (667 mg) by mouth 3 times daily (with meals) (largest meal) 10/24/2023 Yes Ame Chow PA-C      cholecalciferol 50 MCG (2000 UT) CAPS Take 1 capsule by mouth daily On dialysis days (MWF) 10/23/2023 Yes Reported, Patient     fluticasone-salmeterol (ADVAIR) 250-50 MCG/ACT inhaler Inhale 1 puff into the lungs every 12 hours 10/24/2023 Yes Ame Chow PA-C Yes    Magnesium Cl-Calcium Carbonate (SLOW-MAG) 71.5-119 MG TBEC Take 2 tablets by mouth four times a week Take on non dialysis days T/Th/Sa/Hope 10/24/2023 Yes Ame Chow PA-C No    metoprolol tartrate (LOPRESSOR) 25 MG tablet 1 tablet (25 mg) by Oral or Feeding Tube route 2 times daily 10/24/2023 Yes Ame Chow PA-C Yes    montelukast (SINGULAIR) 10 MG tablet Take 1 tablet (10 mg) by mouth At Bedtime 10/24/2023 Yes Ame Chow PA-C Yes    multivitamin RENAL (RENAVITE RX/NEPHROVITE) 1 tablet tablet Take 1 tablet by mouth daily 10/24/2023 Yes Ame Chow PA-C     pantoprazole (PROTONIX) 40 MG EC tablet Take 1 tablet (40 mg) by mouth every morning (before breakfast) 10/24/2023 Yes Yenni Graham MD     posaconazole (NOXAFIL) 100 MG EC tablet Take 3 tablets (300 mg) by mouth daily 10/24/2023 Yes Ame Chow PA-C     predniSONE (DELTASONE) 5 MG tablet Take 1 tablet (5 mg) by mouth every morning 10/24/2023 Yes Ame Chow PA-C     sulfamethoxazole-trimethoprim (BACTRIM) 400-80 MG tablet Take 1 tablet by mouth Every Mon, Wed, Fri Morning 10/23/2023 Yes Ame Chow PA-C No    tacrolimus (GENERIC) 0.5 MG capsule Take 1 capsule (0.5 mg) by mouth every morning Total dose: 0.5 mg in AM and 1 mg in PM 10/24/2023 Yes Ame Chow PA-C No    tacrolimus (GENERIC) 1 MG capsule Take 1 capsule (1 mg) by mouth every evening Total dose: 0.5 mg in AM and 1 mg in PM 10/23/2023 Yes Ame Chow PA-C No    valGANciclovir (VALCYTE) 450 MG tablet Take 1 tablet (450 mg) by mouth three times a week Take on dialysis days AFTER dialysis 10/23/2023 Yes Ame Chow PA-C Yes

## 2023-10-28 NOTE — PROGRESS NOTES
[facility]    Medicine Progress Note - Medicine Service, JOSE TEAM 3    Date of Admission:  10/25/2023    Assessment & Plan      Kecia Blue is a 62 year old female admitted on 10/25/2023. 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    Today:  - removed wedding ring on L hand d/t significant hand swelling  - B/L non-vascular breast/axillary US for possible mass/ compression  - B/L LE US for possible DVT  - CT CAP per discussion w/ radiology  - c/w midodrine 10 mg TID   - c/w valgancyclovir 450 mg once daily as below  - c/w HD (Tu, Th, Sat)   - scheduled nebs for breathing  - Repeat EBV    #LUE swelling  #LLE swelling  #L breast rash, chronic >1 yr  Pt's L arm appearing asymmetrically swollen compared to R arm, initially noted 10/27/23. L arm swelling was a notable change from previous days of hospitalization. L arm appearing more erythematous vs R arm, still blanchable erythema.  Today the LLE also appears more swollen and erythematous vs RLE.  D/t presence of unilateral extremity swelling, there is concern for DVT.  LUE US performed 10/27/23 returned negative for DVT, although low sensitivity of this test does not fully rule out possible clot. L cervical LAD also appreciable on LUE US. CT PE 10/27/23 returned negative for PE. Pt also has approx 1 yr history of peau d'orange appearance of L breast which may suggest etiology of malignancy and/or lymphatic obstruction extending  to LUE causing unilateral LUE edema. Pt also at risk of post transplant lymphoproliferative disorders given hx of b/l lung transplant c/b EBV virema. Of note, L cervical LAD present. Pt also receives HD and has L arm AV fistula, thus considering possible steal syndrome resulting in unilateral edema.  Discussed with renal dialysis team who examined Pt's previous records and AV fistula during today's HD treatment and assessed that AV fistula functioning appropriately, not currently believed to be attributing to Pt's present symptoms. Also concern for manifestation of existing dermatomyositis, CK ordered yesterday 10/27, result = 69. Also concern for possible viral exanthem in setting of CMV viremia and history of EBV viremia, resulting in diffuse erythema, although may be questionable if this would likely result in unilateral L-sided edema.   - B/L non-vascular breast/axillary US for possible mass/ compression  - B/L LE US for possible DVT  - Discussed case with radiology, will obtain CT CAP for initial w/u for possible PTLD  - Remove rings from 4th digit L hand d/t hand swelling     #Diffuse erythema  #Diffuse pruritus, currently resolved  #Hx dermatomyositis  Pt continues to have blanchable erythema diffusely throughout torso and extremities. Previous pruritus responsive to antihistamines, pruritus now resolved.   After discussion with dermatology team, presentation is considered to be inconsistent with drug reaction.  Presentation may be manifestation of underlying dermatomyositis or other rheumatologic disease.   - Dermatology following   - Pt and  deferring skin biopsy currently   - Will consult rheumatology    - CK and aldolase for dermatomyositis  - c/w Atarax 25mg q6h PRN for itching    #Hypotension  #Arrythmia (Afib)  Rapid response was called overnight on 10/26 and 10/27 d/t hypotension, tachycardia, and tachypnea. Doppler venous US of bilateral lower extremities on 10/26 with no DVT. PTA metoprolol  held. Lactate 1.2. Temperature remains < 100.4* F. Hypotension responsive to albumin and midodrine. EKG 10/27 demonstrating Afib. Bedside cardiac ultrasound  10/27 with collapsible IVC. Main concern on differential remains sepsis 2/2 suspected pulmonary infection, drug reaction, or possible adrenal insufficiency. However, episodic hypotension may be within Pt's normal range, as Pt continues to remain asymptomatic during RRTs. If concern for adrenal insufficiency, may consider stress dose steroids in case of drug reaction or possible adrenal insufficiency if becomes hypotensive again. TTE completed 10/27 w/ EF 60-65%. Pulmonary hypertension noted.   - c/w midodrine 10 mg TID     #Acute hypoxic respiratory failure  #Pneumonia   #Leukocytosis in immunosuppressed pt  #ILD 2/2 anti-synthetase syndrome s/p B/L lung transplant 3/2018  #CLAD  #Actinomyces (+) BAL (10/17)  #Hx Stenotrophomonas Pneumonia  #CMV viremia  #h/o EBV viremia  #Prolongation of QTc  Pt with 2month hx of cough. Hx BL lung transplant (2018) c/b right mainstem bronchial stenosis requiring serial treatments with balloon bronchoplasty procedures, most recently on 10/17/23.  CT Chest from 10/24 notable for worsening consolidation BL c/f worsening infection, along with new prominent KONRAD soft tissue thickening. BAL on 10/17 notable actinomyces. Pt also has hx of stenotrophomonas from August of 2023. Given the above, highest suspicion remains pulmonary source of infection. Procalcitonin 10/25/23 elevated at 0.7, suggestive of moderate systemic infection. Completing infectious work-up with UA, Bcx x2. MRSA/MSSA PCR 10/25/23 negative.  Blood cultures collected 10/25/23, pending results. CXR 10/27/23 demonstrating bilateral perihilar airspace opacification, small bilateral pleural effusions, no pneumothorax -  reflective of moderate pulm edema, unable to r/o superimposed infectious process. Chest CT 10/27/23 demonstrating decreased edema in lungs with continued  small pleural effusions, suggestive of improving atypical infection. CrAg, HBV, Respiratory panel PCR 10/26 returned negative. Echo 10/27/23 showing normal appearing RV, pulmonary hypertension noted. Concern for worsening CLAD or rejection, per transplant pulmonary note. Remaining transplant ID and transplant pulmonary work-up pending results.  -Pulmonary transplant team following:   -Wean supplemental O2 PRN to maintain o2 saturations >92%  - DSA and ImmuKnow (10/27) pending   -c/w azathioprine 50mg qday.   -c/w tacrolimus. Check levels per translplant pulm   -c/w prednisone 5mg qday   -c/w Bactrim for PJP ppx  -CMV + EMV levels per transplant pulm  -stopped azithromycin d/t prolonged Qtc (512)  -c/w singulair + advair   -Transplant ID following:  -c/w zosyn (10/25-p) for 2 weeks, may then consider transitioning to augmentin, per ID transplant recs  -c/w valgancyclovir 450 mg daily for 3 weeks (10/27-p), or until x2 negative CMV checks, whichever is longer. Then return to valgancyclovir three times weekly on dialysis days after receiving dialysis.    -awaiting results from:  -Urine histoplasma antigen quant  -Sputum culture  -AFB culture   -Nocardia culture  -Fungitell   -Scheduled nebs for breathing    #ESRD on HD (Tu, Th, Sa)  #Anemia of CKD, macrocytic  #Hx Hyperphosphatemia  Hx of ESRD on HD, schedule PTA was M, W, F. In hospital receiving HD Tu, Th, Sat; will be changed back to M, W, F schedule post discharge. Phosphorous level 1.9 10/28/23. Changing dialysis schedule to Tu, Th, Sat during hospitalization.   -c/w HD (Tu, Th, Sat). No ultrafiltration to reduce fluid removal.   -ESRD service consulted:   -c/w pta vitamins   -Epo/iron per nephrology   -hold phoslo d/t drop in phosphorous   -renal panel daily    #Hx Left Sided Aspergillus Empyema:  First noted in 2019. Needle aspiration confirmatory of aspergillus fumigatus, now s/p intrapleural amphotericin bead placement. Discussions had regarding surgery,  however, surgery felt to be associated with too high of morbidity, and indefinite posaconazole decided as plan.  -Transplant pulmonology following   - c/w PTA posaconazole. Check levels per transplant pulm.    #h/o C diff colitis  Pt appears to have remote hx of c. Diff colitis. Currently without any diarrhea.     Diet: Renal Diet (dialysis)    DVT Prophylaxis: Heparin SQ  Lines: None     Cardiac Monitoring: ACTIVE order. Indication: Tachyarrhythmias, acute (48 hours)  Code Status: Full Code      Disposition Plan      Expected Discharge Date: 10/31/2023      Destination: home with family  Discharge Comments: Dispo: home w/ family, OP HD  Plan: Pending further infectious work-up; sepsis vs. albuterol side-effect vs. Vanco allergy  Progress: albumin w/ midodrine; LUE edema          The patient's care was discussed with the Attending Physician, Dr. Emanuel and Patient.    Richy Jean  Medical Student  Medicine Service, [address]  Securely message with Vocera (more info)  Text page via [facility] Paging/Directory   See signed in provider for up to date coverage information    Resident/Fellow Attestation   I, Sheeba Nesbitt MD, was present with the medical/MARIA A student who participated in the service and in the documentation of the note.  I have verified the history and personally performed the physical exam and medical decision making.  I agree with the assessment and plan of care as documented in the note.      Sheeba Nesbitt MD  PGY-1  Medicine-Pediatrics Resident  [facility]  Date of Service (when I saw the patient): 10/28/2023   ______________________________________________________________________    Interval History   Overnight events:   - Rapid response at approx 6-630 AM, prior to Pt leaving for HD.  BP dropped to high 80s/50s. Midodrine given. Pt remained asymptomatic.      Today's interview:  - Pt reports cough worsened last night to point of  emesis of Pt's dinner meal and liquids. Cough improved enough to where she reports she could sleep, but has since returned upon waking this AM.   - Pt reports L arm remains swollen, is now painful. L hand swelling preventing Pt from being able to remove rings off fingers. Has tried removing rings via use of creams, using string to compress finger, but all attempts have failed. Pt asks if ED staff are capable of cutting of rings if needed.  - Pt states L arm painful when skin was sterilized prior to HD  - Has been applying steroid creams to extremities, unsure if finding notable relief  - continues to deny pruritus    Physical Exam   Vital Signs: Temp: 97.7  F (36.5  C) Temp src: Oral BP: 97/55 Pulse: 104   Resp: 30 SpO2: 99 % O2 Device: Nasal cannula Oxygen Delivery: 4 LPM    General Appearance:  Sitting upright in bed receiving HD, appears to be in moderate distress d/t frequent cough. Awake, alert, oriented to person, place, time.  Respiratory: Coarse breath sounds bilaterally, R > L lung. mildly labored breathing on 2L NC w/ frequent cough.  Cardiovascular: RRR, normal S1 and S2, no additional heart sounds, murmurs, gallops.   GI: Soft, nontender, nondistended. BS+  Skin/Extremities: Skin appears diffusely erythematous across torso, extremities. Erythema more prominent on L > R side of body extremities, erythema is blanchable. LUE and LLE edema.     Data   NOTE: Data reviewed over the past 24 hrs contributes toward MDM complexity

## 2023-10-28 NOTE — PROGRESS NOTES
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  The patient was admitted on 10/25/2023 with malaise, cough, and dyspnea along with 2 weeks of new hypoxia, CT chest concerning for uncontrolled infection.     Today's recommendations:  - Consider repeat bronchoscopy to assess for recurrent stenosis.  - Follow pending blood cultures (10/25)  - Sputum cultures, Histo urine Ag, and UA/UC ordered pending collection  - ABX per transplant ID, low threshold to broaden with clinical decline  - Wean supplemental oxygen to maintain SpO2>92%  - DSA and ImmuKnow (10/27) pending  - Tacrolimus level 7.3, continue current dose and recheck Monday (ordered)  - Hold azithromycin for now with prolonged QTc  - 10/27 Fungitell and Aspergillus galactomannan (-)  - Continue posaconazole, repeat level (10/27) pending  - VGCV dose increased per transplant ID, repeat CMV level ordered 10/31  - EBV ordered       Acute hypoxic respiratory failure:  Concern for pneumonia:  Hypotension:  Arrhythmia: Progressive malaise, productive cough of clear sputum, and dyspnea for 2 months with occasional wheezing.  S/p bronch/BAL (8/17) with Steno s/p Levaquin and Bactrim without improvement.  CT chest (10/11, [facility] overread) with increased GGO and nodules t/o RML and KONRAD.  New hypoxia at home (2L NC x 2 weeks).  S/p OR bronch/BAL (10/17) with RM stenosis dilation (as below).  Felt well for 24 hours then symptoms returned.  Bronch culture (10/17) with Actinomyces sp. and started on Augmentin the day PTA.  CT chest (10/24) with worsening  consolidation bilaterally concerning for worsening infection, new prominent left upper lobe anterior pleural-based soft tissue thickening, mild air trapping.  Low grade temp (100 degree F on 10/25), leukocytosis (3.9 --> 11.6), LA 1.1, and procal 0.7.  MRSA nares (10/25), Cryptococcal Ag (10/26), and respiratory panel (10/27) all negative.  DDx includes pneumonia/infection, PE (tachycardia and hypotension, extremity US negative DVT, echo with normal appearing RV although pulmonary HTN noted), worsening CLAD, rejection (DSA pending as below, cannot rule out ACR), cardiac, hypervolemia (missed dialysis 10/25, CXR 10/25 with pulmonary edema).  Significant LUE swelling 10/27, repeat US without DVT although noted left cervical lymphadenopathy and subcutaneous soft tissue thickening in the distal upper extremity.  No significant improvement in symptoms with current Abx.   - Blood cultures (10/25) NGTD  - 10/27 respiratory panel PCR (-)  - Bacterial, fungal, AFB, and Nocardia sputum cultures ordered pending collection  - Histo urine Ag, UA/UC ordered pending collection  - Encourage IS and Aerobika  - Nebs: Xopenex and Mucomyst TID (10/26)   - ABX: IV Zosyn (10/25) per transplant ID and anticipate 2 week course and probably deescalate to Augmentin once she improves for discharge; s/p minocycline (10/25-10/26) and vancomycin (10/25) discontinued per transplant ID, low threshold to resume or revisit ABX regimen with clinical decline, if vancomycin resumed premedicate with antihistamine and slow infusion time given vancomycin-infusion erythrodermic pruritic reaction on 10/25  - Supplemental O2 to maintain SpO2>92%, wean as able  - CXR 10/27 with persistent bibasilar mild mixed interstitial and airspace opacification, small bilateral pleural effusions.  - CT PE 10/27 with no PE, noted decreased edema in the lung with continued small pleural effusions, cardiomegaly, and left breast skin thickening and breast edema      S/p  bilateral sequential lung transplant (BSLT) for ILD: Pulmonary clinic visit with Ame Claudine 10/24 with increased cough and dyspena, occasional wheezing, as above.  PFTs decreased.  DSA negative 8/8.  IgG adequate 979 on 10/27.   - DSA (10/27) pending     Immunosuppression: Recent ImmuKnow 479 on 9/7, suggesting increased risk of rejection.  - Tacrolimus 0.5 mg qAM / 1 mg qPM (Pt. missed 10/25 AM dose).  Goal level 8-10.  Repeat level 10/28 is slightly subtherapeutic at 7.3 (13h) but with active infection, will not increased dose, recheck a level Monday.  - AZA 50 mg daily (decreased 10/26 from 75 mg daily given concern for infection)  - Prednisone 5 mg daily  - ImmunKnow (10/27) pending     Prophylaxis:   - Bactrim MWF for PJP ppx     CLAD: PTA azithromycin --> hold for now with prolonged QTc, Singulair, and Breo (hospital substitute for Advair)     Right main bronchial stenosis s/p recurrent dilatation: Follows with IP for serial bronchs with dilation.  Last bronch (10/17) with dilation. Patient reports marked improvement for approx 24h after the dilation but then rapid return of symptoms. Now with coarse insp/exp wheeze on exam. Concerning for recurrent stenosis. Recommend repeat bronchoscopy to assess airways.  - Recommend repeat bronchoscopy this week\ to check for recurrent stenosis  - Airway clearance as above (PTA albuterol nebs q6h prn, Mucomyst nebs BID prn, and Pulmicort neb daily prn)  - Per transplant ID: recommend post-dilation course of Augmentin for ~2 weeks in the future (or alternatively, 1 week out of each month)     Left-sided Aspergillus empyema: Noted in 2019 s/p needle aspiration with Aspergillus fumigatus on cultures s/p intrapleural amphotericin bead placement.  Posaconazole indefinitely per transplant ID, most recent level 1 on 2/9.    - Fungitell and Aspergillus galactomannan (10/27) (-)  - PTA PO posaconazole, repeat level (10/27) pending     Recurrent CMV viremia: Positive at 2,450 on  10/4 and peaked at 2,680 on 10/11, down to  on 10/24.  - CMV weekly, ordered 10/31  - PTA VGCV induction dosing (450 mg MWF after HD), dose increased to 450 mg daily on 10/27 per transplant ID for 3 weeks or until viral load negative x2 checks, then back to 3 times weekly after HD dosing after that     EBV viremia: EBV <35 on 10/11, from 42k with log 4.6 on 8/8.   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   - Rheumatology consult     ESRD on iHD (since 10/2019): On MWF iHD.  Renal transplant evaluation on hold currently for infection and worsening PFTs of unknown etiology.  Missed dialysis run on 10/25.  Management per nephrology.     We appreciate the excellent care provided by the [place] [id number] team.  Recommendations communicated via ]in person rounding, telephone, and this note.  Will continue to follow along closely, please do not hesitate to call with any questions or concerns.    Lopez Macias MD  [phone]            Interval History:   Slept poorly due to rash and swelling.  Hypotensive this morning.  Notes dyspnea improved after last dilation for about 24h then worse again  Persistent left upper ext swelling  Dyspnea at rest: None  Dyspnea on exertion:  With mild exertion, unchanged  Cough:  frequent especially when supine  Sputum: small amt of white-clear  Hemoptysis: none   Chest Pain: None           Review of Systems:   C: NEGATIVE for fever, chills  INTEGUMENTARY/SKIN: diffuse erythema  ENT/MOUTH: no sore throat, new sinus pain or nasal drainage  RESP: see interval history  CV: NEGATIVE for chest pain, palpitations or peripheral edema  GI: no nausea, vomiting, change in stools  : minimal urine outpt  MUSCULOSKELETAL: no  myalgias, arthralgias            Medications:      acetylcysteine  2 mL Nebulization TID    azaTHIOprine  50 mg Oral QPM    [Held by provider] azithromycin  250 mg Oral Daily    [Held by provider] calcium acetate  667 mg Oral TID w/meals    fluticasone-vilanterol  1 puff Inhalation Daily    heparin ANTICOAGULANT  5,000 Units Subcutaneous Q12H    hydrocortisone   Topical BID    levalbuterol  1.25 mg Nebulization 3 times daily    [Held by provider] metoprolol tartrate  25 mg Oral or Feeding Tube BID    midodrine  10 mg Oral TID w/meals    montelukast  10 mg Oral At Bedtime    multivitamin RENAL  1 tablet Oral Daily    pantoprazole  40 mg Oral QAM AC    piperacillin-tazobactam  2.25 g Intravenous Q6H    posaconazole  300 mg Oral Daily    predniSONE  5 mg Oral QAM    sulfamethoxazole-trimethoprim  1 tablet Oral Once per day on Monday Wednesday Friday    tacrolimus  0.5 mg Oral QAM    tacrolimus  1 mg Oral QPM    triamcinolone   Topical BID    valGANciclovir  450 mg Oral Daily    Vitamin D3  50 mcg Oral Daily     acetaminophen, acetylcysteine, albumin human, albuterol, benzonatate, budesonide, guaiFENesin, hydrOXYzine, melatonin, ondansetron **OR** ondansetron, prochlorperazine **OR** prochlorperazine **OR** prochlorperazine, sodium chloride 0.9%, - MEDICATION INSTRUCTIONS -         Physical Exam:   Temp:  [97.6  F (36.4  C)-98.4  F (36.9  C)] 97.7  F (36.5  C)  Pulse:  [] 103  Resp:  [13-34] 19  BP: ()/(53-91) 96/58  SpO2:  [91 %-98 %] 94 %  No intake or output data in the 24 hours ending 10/28/23 0848  Constitutional:   Awake, alert and in no apparent distress     Eyes:   nonicteric     ENT:    oral mucosa moist without lesions     Neck:   Supple without supraclavicular or cervical lymphadenopathy     Lungs:   Good air flow.  Left basilar  crackles. No rhonchi.  Diffuse coarse insp/exp wheezes.     Cardiovascular:   Normal S1 and S2.  RRR.  No murmur. No gallop. No rub.     Abdomen:   NABS, soft,  nondistended, nontender.  No HSM.     Musculoskeletal:   Tr  edema     Neurologic:   Alert and conversant.     Skin:   Diffuse erythema            Data:   All laboratory and imaging data reviewed.    Results for orders placed or performed during the hospital encounter of 10/25/23 (from the past 24 hour(s))   XR Chest 2 Views    Narrative    XR CHEST 2 VIEWS  10/27/2023 9:46 AM     HISTORY:  S/p lung transpant, f/u consolidations       COMPARISON:  Chest radiograph 10/25/2023, CT chest 10/24/2023    TECHNIQUE: XR CHEST 2 VIEWS    FINDINGS:   Mild by basilar predominant mixed airspace and interstitial opacities,  with otherwise interval improved aeration compared to prior  radiograph.  Small bilateral pleural effusions. No pneumothorax. Cardiac silhouette  is normal.        Impression    IMPRESSION:  Persistent bibasilar mild mixed interstitial and airspace  opacification, with improved aeration compared to 10/25/2023. Small  bilateral pleural effusions.    I have personally reviewed the examination and initial interpretation  and I agree with the findings.    TERRI ISSA MD         SYSTEM ID:  [id number]   US Upper Extremity Venous Duplex Left    Narrative    EXAMINATION: DOPPLER VENOUS ULTRASOUND OF THE LEFT UPPER EXTREMITY,  10/27/2023 11:51 AM     COMPARISON: Ultrasound 10/25/2023, CT 10/24/2023    HISTORY: Isolated edema of left upper extremity, assess for DVT     TECHNIQUE:  Gray-scale evaluation with compression, spectral flow and  color Doppler assessment of the deep venous system of the left upper  extremity.    FINDINGS:    Left: Normal blood flow and waveforms are demonstrated in the internal  jugular, innominate, subclavian, and axillary veins. There is normal  compressibility of the brachial, basilic and cephalic veins.    Subcutaneous soft tissue thickening in the distal left upper extremity  may represent subcutaneous edema.    Multiple left cervical lymph nodes incidentally seen, each  retaining  fatty marguerite, the largest measures 0.9 cm short axis.      Impression    IMPRESSION:    1. No deep venous thrombosis demonstrated in the left upper extremity.  2. Incidental left cervical lymphadenopathy, possibly reactive,  consider attention on follow-up.  3. Subcutaneous soft tissue thickening in the distal left upper  extremity may represent subcutaneous edema versus cellulitis in the  appropriate clinical settings.    I have personally reviewed the examination and initial interpretation  and I agree with the findings.    EDIE VILLA MD         SYSTEM ID:  [id number]   CT Chest Pulmonary Embolism w Contrast    Narrative    CT chest pulmonary angiogram with contrast    INDICATION: Assess for pulmonary embolism. Calcified left arm  swelling.    Contrast: 53 mL intravenous [id number]    COMPARISON: CT chest 10/24/2023    FINDINGS: Right upper extremity injection. Thyroid appears  unremarkable. Left breast skin thickening edema. Atherosclerotic  calcifications of the thoracic aorta. Aorta size normal. The pulmonary  artery caliber is upper normal, 3.2 cm. Multiple upper normal size  bilateral axillary lymph nodes may be reactive. No enlarged  mediastinal or hilar or internal mammary nodes. No enlarged  retroperitoneal nodes. There is left retropectoral (series 4 image  38).  Pulmonary system was reasonably well-opacified with contrast. No  hypodense filling defect in the pulmonary artery system to indicate  embolus.  Small pleural effusions bilaterally. No pericardial effusion. Mild  cardiomegaly.  Lungs are transplanted and show patchy opacities throughout the right  lower lobe especially with loculated pleural fluid on the left common  fissural thickening or other edema in the lungs. Right lower lobe  patchy edema rather than infectious consolidation also present. The  pleural effusions are similar. The edema appears decreased in the  lungs.  Bone detail shows osteopenia. Median sternotomy       Impression    IMPRESSION:  1. No CT evidence of acute pulmonary embolus.  2. Decreased edema in the lungs with continued small pleural  effusions. Differential could be improving atypical infection.  3. Bilateral lung transplantation.  4. Cardiomegaly.  5. Left breast skin thickening and breast edema of uncertain etiology.    TERRI ISSA MD         SYSTEM ID:  [id number]   CBC with Platelets & Differential    Narrative    The following orders were created for panel order CBC with Platelets & Differential.  Procedure                               Abnormality         Status                     ---------                               -----------         ------                     CBC with platelets and d...[[id number]]  Abnormal            Preliminary result           Please view results for these tests on the individual orders.   Comprehensive metabolic panel   Result Value Ref Range    Sodium 127 (L) 135 - 145 mmol/L    Potassium 3.2 (L) 3.4 - 5.3 mmol/L    Carbon Dioxide (CO2) 21 (L) 22 - 29 mmol/L    Anion Gap 14 7 - 15 mmol/L    Urea Nitrogen 27.7 (H) 8.0 - 23.0 mg/dL    Creatinine 4.02 (H) 0.51 - 0.95 mg/dL    GFR Estimate 12 (L) >60 mL/min/1.73m2    Calcium 8.7 (L) 8.8 - 10.2 mg/dL    Chloride 92 (L) 98 - 107 mmol/L    Glucose 95 70 - 99 mg/dL    Alkaline Phosphatase 107 (H) 35 - 104 U/L    AST 12 0 - 45 U/L    ALT 16 0 - 50 U/L    Protein Total 5.5 (L) 6.4 - 8.3 g/dL    Albumin 2.7 (L) 3.5 - 5.2 g/dL    Bilirubin Total 0.9 <=1.2 mg/dL   Phosphorus   Result Value Ref Range    Phosphorus 1.9 (L) 2.5 - 4.5 mg/dL   Erythrocyte sedimentation rate auto   Result Value Ref Range    Erythrocyte Sedimentation Rate 58 (H) 0 - 30 mm/hr   CBC with platelets and differential   Result Value Ref Range    WBC Count 6.1 4.0 - 11.0 10e3/uL    RBC Count 2.49 (L) 3.80 - 5.20 10e6/uL    Hemoglobin 8.2 (L) 11.7 - 15.7 g/dL    Hematocrit 25.5 (L) 35.0 - 47.0 %     (H) 78 - 100 fL    MCH 32.9 26.5 - 33.0 pg    MCHC 32.2  31.5 - 36.5 g/dL    RDW 17.3 (H) 10.0 - 15.0 %    Platelet Count 155 150 - 450 10e3/uL    % Neutrophils      % Lymphocytes      % Monocytes      % Eosinophils      % Basophils      % Immature Granulocytes      Absolute Neutrophils      Absolute Lymphocytes      Absolute Monocytes      Absolute Eosinophils      Absolute Basophils      Absolute Immature Granulocytes       *Note: Due to a large number of results and/or encounters for the requested time period, some results have not been displayed. A complete set of results can be found in Results Review.

## 2023-10-29 NOTE — PLAN OF CARE
[id number]: Patient denies pain and reports that tessalon pearls have been very helpful with cough. Slept well throughout the night. BP stable. Up to commode with SBA/A1 and had 2 BMs. Satting in the high 90's-100% on 2L. PIV running TKO in between antibiotics. Skin not as red as yesterday and swelling slightly improved overall. Will continue with plan of care and notify MD of any changes.      Goal Outcome Evaluation:      Plan of Care Reviewed With: patient, spouse    Overall Patient Progress: no changeOverall Patient Progress: no change    Outcome Evaluation: Pt feeling better today, denies pain, skin not as red but still hot to the touch. Up with SBA/A1 to bedside commode.

## 2023-10-29 NOTE — PROGRESS NOTES
[facility]    Medicine Progress Note - Medicine Service, JOSE TEAM 3    Date of Admission:  10/25/2023    Assessment & Plan      Kecia Blue is a 62 year old female admitted on 10/25/2023. 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    Today:  - Increase tessalon pearles to 100mg q4h PRN  - Consider CTV tomorrow   - continue with prednisone burst (currently at 20mg qday)    #LUE swelling, stable  #LLE swelling, stable- improevd  #L breast rash, chronic >1 yr  Pt's L arm appearing asymmetrically swollen compared to R arm, initially noted 10/27/23. L arm swelling was a notable change from previous days of hospitalization.  On 10/28, LLE also more swollen and erythematous vs RLE.  Edema significant to the point that wedding ring had to be cut off with mechanical saw. D/t presence of unilateral extremity swelling, there is concern for DVT.  LUE US performed 10/27/23 returned negative for DVT. CT PE 10/27/23 returned negative for PE. Pt also has approx 1 yr history of peau d'orange appearance of L breast- of note had mammogram in 02/2023 which was negative for malignancy. Compressive mass contributing to SVC picture considered as cause of unilateral edema- discussed with radiology, who recommended CT a/p to look for additional lymphadenopathy, along with possibility of CTV on Monday to assess for SVC syndrome. Pt also at risk of post transplant lymphoproliferative disorders given hx  of b/l lung transplant c/b EBV virema. Discussed c/f fistula issue v. Steal syndrome with nephrology who after chart review and physical exam feels confident this is not source of edema; no indication to pursue fistulogram at this point. Also concern for manifestation of existing dermatomyositis, CK ordered yesterday 10/27, result = 69. Also concern for possible viral exanthem in setting of CMV viremia and history of EBV viremia, resulting in diffuse erythema, although may be questionable if this would likely result in unilateral L-sided edema.   - Appreciate pulm transplants perspective on PTLD as etiology of edema  - Consider CTV 10/30 to further investigate for SVC syndrome.    #Diffuse erythema, resolved  #Diffuse pruritus, resolved  #Hx dermatomyositis  In ED, pt noted to have blanchable erythema diffusely throughout torso and extremities. Appears was acute in onset. Had received zosyn prior to rash, along with Augmentin the day before. Had not yet been given vancomycin. Dermatology consulted; not felt to be drug rash. Rheumatology also consulted d/t concern that skin presentation could be 2/2 dermatomyositis flare. Per Rheum, unlikely to be acute flare, however, reasonable to treat rash with steroids, which was discussed and approved with transplant pulmonology.  - Dermatology following  - Rheumatology following:   -Dermatomyositis serologies pending   -Prednisone 20mg x3 days, 15mg x3 days, 10mg x3 days then return to pta 5mg qday  - c/w Atarax 25mg q6h PRN for itching    #Recurrent transient hypotension w/o altered mentation  #Arrythmia (Afib)  Rapid response was called overnight on 10/26 and 10/27 d/t hypotension, tachycardia, and tachypnea. Doppler venous US of bilateral lower extremities on 10/26 with no DVT. PTA metoprolol held. Lactate 1.2. Temperature remains < 100.4* F. Hypotension responsive to albumin and midodrine. EKG 10/27 demonstrating Afib. Bedside cardiac ultrasound  10/27 with collapsible  IVC. Main concern on differential remains sepsis 2/2 suspected pulmonary infection, drug reaction, or possible adrenal insufficiency. However, episodic hypotension may be within Pt's normal range, as Pt continues to remain asymptomatic during RRTs. TTE completed 10/27 w/ EF 60-65%. Pulmonary hypertension noted. Of note, started on steroids for rash on 10/28 without recurrence of RRT overnight.  - c/w midodrine 10 mg TID     #Acute hypoxic respiratory failure  #Pneumonia   #Leukocytosis in immunosuppressed pt  #ILD 2/2 anti-synthetase syndrome s/p B/L lung transplant 3/2018  #CLAD  #Actinomyces (+) BAL (10/17)  #Hx Stenotrophomonas Pneumonia  #CMV viremia  #h/o EBV viremia  #Prolongation of QTc  Pt with 2month hx of cough. Hx BL lung transplant (2018) c/b right mainstem bronchial stenosis requiring serial treatments with balloon bronchoplasty procedures, most recently on 10/17/23.  CT Chest from 10/24 notable for worsening consolidation BL c/f worsening infection, along with new prominent KONRAD soft tissue thickening. BAL on 10/17 notable actinomyces. Pt also has hx of stenotrophomonas from August of 2023. Given the above, highest suspicion remains pulmonary source of infection. Procalcitonin 10/25/23 elevated at 0.7, suggestive of moderate systemic infection. Completing infectious work-up with UA, Bcx x2. MRSA/MSSA PCR 10/25/23 negative.  Blood cultures collected 10/25/23, pending results. CXR 10/27/23 demonstrating bilateral perihilar airspace opacification, small bilateral pleural effusions, no pneumothorax -  reflective of moderate pulm edema, unable to r/o superimposed infectious process. Chest CT 10/27/23 demonstrating decreased edema in lungs with continued small pleural effusions, suggestive of improving atypical infection. CrAg, HBV, Respiratory panel PCR 10/26 returned negative. Echo 10/27/23 showing normal appearing RV, pulmonary hypertension noted. Remaining transplant ID and transplant pulmonary work-up  pending results.  -Pulmonary transplant team following:   -Wean supplemental O2 PRN to maintain o2 saturations >92%  -DSA and ImmuKnow (10/27) pending  -Consider bronchoscopy per pulm   -c/w azathioprine 50mg qday.   -c/w tacrolimus. Check levels per translplant pulm   -c/w prednisone 5mg qday   -c/w Bactrim for PJP ppx  -CMV + EMV levels per transplant pulm  -stopped azithromycin d/t prolonged Qtc (512)  -c/w singulair + advair   -Transplant ID following:  -c/w zosyn (10/25-p) for 2 weeks per transplant ID   -Can transition to Augmentin at time of discharge  -c/w valgancyclovir 450 mg daily for 3 weeks (10/27-p), or until x2 negative CMV checks, whichever is longer. Then return to valgancyclovir three times weekly on dialysis days after receiving dialysis.    -awaiting results from:  -Urine histoplasma antigen quant  -Sputum culture  -AFB culture   -Nocardia culture  -Fungitell   -Scheduled nebs for breathing    #ESRD on HD (Tu, Th, Sa)  #Anemia of CKD, macrocytic  #Hx Hyperphosphatemia  Hx of ESRD on HD, schedule PTA was M, W, F. In hospital receiving HD Tu, Th, Sat; will be changed back to M, W, F schedule post discharge. Phosphorous level 1.9 10/28/23. Changing dialysis schedule to Tu, Th, Sat during hospitalization. Phoslo held on 10/29 due downtrending phos; recheck notable for phos 1.1; required IV + PO supplementation with resolution of hypophosphatemia.  -c/w HD (Tu, Th, Sat).  -ESRD service consulted:   -c/w pta vitamins   -Epo/iron per nephrology   -hold phoslo d/t drop in phosphorous   -renal panel daily    #Hx Left Sided Aspergillus Empyema:  First noted in 2019. Needle aspiration confirmatory of aspergillus fumigatus, now s/p intrapleural amphotericin bead placement. Discussions had regarding surgery, however, surgery felt to be associated with too high of morbidity, and indefinite posaconazole decided as plan.  -Transplant pulmonology following   - c/w PTA posaconazole. Check levels per transplant  pulm.    #h/o C diff colitis  Pt appears to have remote hx of c. Diff colitis. Currently without any diarrhea.     Diet: Renal Diet (dialysis)    DVT Prophylaxis: Heparin SQ  Lines: None     Cardiac Monitoring: None  Code Status: Full Code      Disposition Plan      Expected Discharge Date: 10/31/2023      Destination: home with family  Discharge Comments: Dispo: home w/ family, OP HD  Plan: Pending further infectious work-up; possible bronchoscopy          The patient's care was discussed with the Attending Physician, Dr. Emanuel and Patient.    Keri Tejeda, PGY-3  Medicine Service, [facility] TEAM 3  [facility]  Securely message with [employer] (more info)  Text page via [employer] Paging/Directory   See signed in provider for up to date coverage information  ______________________________________________________________________    Interval History   -No acute events overnight  -Pt overall is feeling better this morning. Feels erythema has completely resolved. Swelling stable in left arm and she feels it's better in her right arm  -No abdominal pain, n/v.    Physical Exam   Vital Signs: Temp: 97.6  F (36.4  C) Temp src: Oral BP: 111/71 Pulse: 95   Resp: 20 SpO2: 98 % O2 Device: Nasal cannula Oxygen Delivery: 2 LPM    General Appearance:  Awake, engages in conversation, appears comfortable.   Respiratory: Nonlabored breathing in 2L NC  GI: Soft, nontender, nondistended. BS+  Skin/Extremities: Diffuse dryness + scaling across face, arms over area of prior erythema. Erythema significantly improved if not resolved. Ongoing left upper extremity edema; unchanged from yesterday. 1+ pitting edema of left lower leg, similar to yesterday.     Data   NOTE: Data reviewed over the past 24 hrs contributes toward MDM complexity

## 2023-10-29 NOTE — PROGRESS NOTES
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  The patient was admitted on 10/25/2023 with malaise, cough, and dyspnea along with 2 weeks of new hypoxia, CT chest concerning for uncontrolled infection. Modest clinical improvement with current therapy.     Today's recommendations:  - Consider repeat bronchoscopy to assess for recurrent stenosis.  - Follow pending blood cultures (10/25)  -  Histo urine Ag, and UA/UC ordered pending collection  - ABX per transplant ID, low threshold to broaden with clinical decline  - Wean supplemental oxygen to maintain SpO2>92%  - DSA and ImmuKnow (10/27) pending  - Tacrolimus level Monday (ordered)  - Hold azithromycin for now with prolonged QTc  - Continue posaconazole, repeat level (10/27) pending  - VGCV dose increased per transplant ID, repeat CMV level ordered 10/31  - EBV 10/28 pending   - Bacterial, fungal, AFB, and Nocardia sputum cultures from 10/26 with oral contamination, needs to be recollected.        Right main bronchial stenosis s/p recurrent dilatation: Follows with IP for serial bronchs with dilation.  Last bronch (10/17) with dilation. Patient reports marked improvement for approx 24h after the dilation but then rapid return of symptoms. Now with monotone wheeze on exam R>L. Review of CT also suggests narrow BI and possible right anastomotic narrowing. Concerning for recurrent stenosis. Recommend repeat bronchoscopy to assess airways.  - Recommend repeat bronchoscopy this week\ to check for recurrent stenosis  - Airway clearance as above (PTA  albuterol nebs q6h prn, Mucomyst nebs BID prn, and Pulmicort neb daily prn)  - Per transplant ID: recommend post-dilation course of Augmentin for ~2 weeks in the future (or alternatively, 1 week out of each month)         Acute hypoxic respiratory failure:  Concern for pneumonia:  Hypotension:  Arrhythmia: Progressive malaise, productive cough of clear sputum, and dyspnea for 2 months with occasional wheezing.  S/p bronch/BAL (8/17) with Steno s/p Levaquin and Bactrim without improvement.  CT chest (10/11, [facility] overread) with increased GGO and nodules t/o RML and KONRAD.  New hypoxia at home (2L NC x 2 weeks).  S/p OR bronch/BAL (10/17) with RM stenosis dilation (as below).  Felt well for 24 hours then symptoms returned.  Bronch culture (10/17) with Actinomyces sp. and started on Augmentin the day PTA.  CT chest (10/24) with worsening consolidation bilaterally concerning for worsening infection, new prominent left upper lobe anterior pleural-based soft tissue thickening, mild air trapping.  Low grade temp (100 degree F on 10/25), leukocytosis (3.9 --> 11.6), LA 1.1, and procal 0.7.  MRSA nares (10/25), Cryptococcal Ag (10/26), and respiratory panel (10/27) all negative.  DDx includes pneumonia/infection, PE (tachycardia and hypotension, extremity US negative DVT, echo with normal appearing RV although pulmonary HTN noted), worsening CLAD, rejection (DSA pending as below, cannot rule out ACR), cardiac, hypervolemia (missed dialysis 10/25, CXR 10/25 with pulmonary edema).  Significant LUE swelling 10/27, repeat US without DVT although noted left cervical lymphadenopathy and subcutaneous soft tissue thickening in the distal upper extremity.  Modest linical improvement with current therapy.  - Blood cultures (10/25) NGTD  - 10/27 respiratory panel PCR (-)  - Bacterial, fungal, AFB, and Nocardia sputum cultures from 10/26 with oral contamination, needs to be recollected.   - Histo urine Ag, UA/UC ordered pending collection  (minimal urine output)  - Encourage IS and Aerobika  - Nebs: Xopenex and Mucomyst TID (10/26)   - ABX: IV Zosyn (10/25) per transplant ID and anticipate 2 week course and probably deescalate to Augmentin once she improves for discharge; s/p minocycline (10/25-10/26) and vancomycin (10/25) discontinued per transplant ID, low threshold to resume or revisit ABX regimen with clinical decline, if vancomycin resumed premedicate with antihistamine and slow infusion time given vancomycin-infusion erythrodermic pruritic reaction on 10/25  - Supplemental O2 to maintain SpO2>92%, wean as able  - CXR 10/27 with persistent bibasilar mild mixed interstitial and airspace opacification, small bilateral pleural effusions.  - CT PE 10/27 with no PE, noted decreased edema in the lung with continued small pleural effusions, cardiomegaly, and left breast skin thickening and breast edema      S/p bilateral sequential lung transplant (BSLT) for ILD: Pulmonary clinic visit with Ame Chow 10/24 with increased cough and dyspena, occasional wheezing, as above.  PFTs decreased.  DSA negative 8/8.  IgG adequate 979 on 10/27.   - DSA (10/27) pending     Immunosuppression: Recent ImmuKnow 479 on 9/7, suggesting increased risk of rejection.  - Tacrolimus 0.5 mg qAM / 1 mg qPM (Pt. missed 10/25 AM dose).  Goal level 8-10.  Repeat level 10/28 was slightly subtherapeutic at 7.3 (13h) but with active infection, dose was not increased, recheck a level Monday.  - AZA 50 mg daily (decreased 10/26 from 75 mg daily given concern for infection)  - Prednisone 5 mg daily  - ImmunKnow (10/27) pending     Prophylaxis:   - Bactrim MWF for PJP ppx     CLAD: PTA azithromycin --> hold for now with prolonged QTc, Singulair, and Breo (hospital substitute for Advair)       H/O Left-sided Aspergillus empyema: Noted in 2019 s/p needle aspiration with Aspergillus fumigatus on cultures s/p intrapleural amphotericin bead placement.  Posaconazole indefinitely per  transplant ID, most recent level 1 on 2/9.    - 10/27 Fungitell and Aspergillus galactomannan (-)  - PTA PO posaconazole, repeat level (10/27) pending     Recurrent CMV viremia: Positive at 2,450 on 10/4 and peaked at 2,680 on 10/11, down to  on 10/24.  - CMV weekly, ordered 10/31  - PTA VGCV induction dosing (450 mg MWF after HD), dose increased to 450 mg daily on 10/27 per transplant ID for 3 weeks or until viral load negative x2 checks, then back to 3 times weekly after HD dosing after that.    - Patient was not aware that the dose was increased to daily and declined the dose today. I explained the change and the rationale and she agreed to increase to daily as recommended. (10/29).     EBV viremia: EBV <35 on 10/11, from 42k with log 4.6 on 8/8.   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   - 10/28 Abd CT:  Basilar airspace consolidation in the visualized lungs. There is superimposed pulmonary edema. Asymmetric anasarca, left greater than right, and trace ascites. Mildly prominent retroperitoneal lymphadenopathy may be reactive, however post transplant lymphoproliferative disease cannot be excluded. Cholelithiasis without acute cholecystitis.  - Check EBV to assess adenopathy (10/28 pending)  - LLE U/S - No evidence left lower extremity deep venous thrombosis.     ESRD on iHD (since 10/2019): On MWF iHD.  Renal transplant evaluation on hold currently for infection and worsening PFTs of unknown etiology.  Missed dialysis run on 10/25.  Management per nephrology.     We appreciate the excellent care provided by the [facility] team.  Recommendations communicated via ]in person rounding, telephone, and this note.  Will continue to follow  along closely, please do not hesitate to call with any questions or concerns.     Lopez Macias MD  [phone]            Interval History:   Feeling generally better today with decreased cough with tessalon and decreased redness/heat from skin  Dyspnea  at rest: None  Dyspnea on exertion: with mild exertion  Cough: intermittent, decreased  Sputum: small amt clear-white  Hemoptysis: none   Chest Pain: None           Review of Systems:   C: NEGATIVE for fever, chills  INTEGUMENTARY/SKIN: no rash or obvious new lesions  ENT/MOUTH: no sore throat, new sinus pain or nasal drainage  RESP: see interval history  CV: NEGATIVE for chest pain, palpitations or peripheral edema  GI: no nausea, vomiting, frequent watery stool  : tolerating dialysis well  MUSCULOSKELETAL: no myalgias, arthralgias            Medications:      acetylcysteine  2 mL Nebulization TID    azaTHIOprine  50 mg Oral QPM    [Held by provider] azithromycin  250 mg Oral Daily    [Held by provider] calcium acetate  667 mg Oral TID w/meals    fluticasone-vilanterol  1 puff Inhalation Daily    heparin ANTICOAGULANT  5,000 Units Subcutaneous Q12H    hydrocortisone   Topical BID    levalbuterol  1.25 mg Nebulization 3 times daily    [Held by provider] metoprolol tartrate  25 mg Oral or Feeding Tube BID    midodrine  10 mg Oral TID w/meals    montelukast  10 mg Oral At Bedtime    multivitamin RENAL  1 tablet Oral Daily    pantoprazole  40 mg Oral QAM AC    piperacillin-tazobactam  2.25 g Intravenous Q6H    posaconazole  300 mg Oral Daily    predniSONE  20 mg Oral QAM    Followed by    [START ON 10/31/2023] predniSONE  15 mg Oral QAM    Followed by    [START ON 11/3/2023] predniSONE  10 mg Oral QAM    Followed by    [START ON 11/6/2023] predniSONE  5 mg Oral QAM    sulfamethoxazole-trimethoprim  1 tablet Oral Once per day on Monday Wednesday Friday    tacrolimus  0.5 mg Oral QAM    tacrolimus  1 mg Oral QPM    triamcinolone   Topical BID    valGANciclovir  450 mg  Oral Daily    Vitamin D3  50 mcg Oral Daily     acetaminophen, acetylcysteine, albuterol, sore throat, benzonatate, budesonide, guaiFENesin, hydrOXYzine, melatonin, ondansetron **OR** ondansetron, prochlorperazine **OR** prochlorperazine **OR** prochlorperazine         Physical Exam:   Temp:  [97.6  F (36.4  C)-98.2  F (36.8  C)] 97.6  F (36.4  C)  Pulse:  [] 95  Resp:  [10-30] 20  BP: ()/(50-74) 111/71  SpO2:  [82 %-100 %] 98 %    Intake/Output Summary (Last 24 hours) at 10/29/2023 0854  Last data filed at 10/28/2023 1145  Gross per 24 hour   Intake 0 ml   Output 0 ml   Net 0 ml     Constitutional:   Awake, alert and in no apparent distress     Eyes:   nonicteric     Neck:   Supple without supraclavicular or cervical lymphadenopathy     Lungs:   Diminished air flow.  Faint scattered crackles. No rhonchi.  Monotone wheeze, R>L     Cardiovascular:   Normal S1 and S2.  RRR.  No murmur. No gallop. No rub.     Abdomen:   NABS, soft, nondistended, nontender.  No HSM.     Musculoskeletal:   TR  edema     Neurologic:   Alert and conversant.     Skin:   Warm, dry.  No rash on limited exam.             Data:   All laboratory and imaging data reviewed.    Results for orders placed or performed during the hospital encounter of 10/25/23 (from the past 24 hour(s))   Lactic Acid STAT   Result Value Ref Range    Lactic Acid 0.7 0.7 - 2.0 mmol/L   CT Abdomen Pelvis w/o Contrast    Narrative    EXAMINATION: CT ABDOMEN PELVIS W/O CONTRAST  10/28/2023 2:12 PM      CLINICAL HISTORY: new L lymphadenopathy, L sided extremity swelling,  look for additional LAD    COMPARISON: 2/9/2021, 10/27/2023    PROCEDURE COMMENTS: CT of the abdomen was performed without   intravenous or oral contrast. Coronal and sagittal reformatted images  were obtained.    FINDINGS:  Lower thorax:   Status post lung transplant. There is a airspace consolidation in the  lower lobes, right middle lobe and lingula with small pleural  effusions and  compressive atelectasis. Calcification in the medial  left lower lobe pleural space is unchanged. Calcified right hilar  lymph node. Cardiomegaly and trace pericardial effusion. Persistent  abnormal skin thickening of the left breast is partially visualized.  Partially imaged sternotomy wires.    Abdomen and pelvis:  Hepatomegaly without cirrhotic morphology. Cholelithiasis without  acute cholecystitis, however the gallbladder is mildly hydropic.  Normal caliber biliary system. Normal pancreas. Borderline prominent  spleen. Normal adrenal glands. Atrophic kidneys with a cyst noted  within the lower pole right kidney and bilateral hilar vascular  calcifications.     Decompressed urinary bladder with intravesicular contrast.     Rectosigmoid distention with liquid stool. Fluid-filled small bowel  loops. Normal appendix.    Diffuse mesenteric edema and trace ascites. Normal-appearing uterus  and adnexa. Multiple prominent retroperitoneal lymph nodes are noted.  For example, a conglomeration of left periaortic nodes with  significant surrounding inflammatory fat stranding measuring up to 11  mm in short axis (series 2 images 38-51). No intra-abdominal free air.  Mild aortobiiliac atherosclerotic without aneurysm.    Osseous structures:   No acute abnormalities.      Impression    IMPRESSION:  1. Basilar airspace consolidation in the visualized lungs. There is  superimposed pulmonary edema.  2. Asymmetric anasarca, left greater than right, and trace ascites.  3. Mildly prominent retroperitoneal lymphadenopathy may be reactive,  however post transplant lymphoproliferative disease cannot be  excluded.  4. Cholelithiasis without acute cholecystitis.    I have personally reviewed the examination and initial interpretation  and I agree with the findings.    REJI VIVAS DO         SYSTEM ID:  [id number]   US Upper Extremity Non Vascular Left    Narrative    EXAMINATION: Doppler evaluation of the left axilla.    COMPARISON:  None.    HISTORY: Assess drainage, has unilateral left-sided swelling and axial    TECHNIQUE:  Gray-scale evaluation with color Doppler of the left  axilla.    FINDINGS: Within the area of interest at the left axilla there is mild  soft tissue edema. No drainable fluid collection.       Impression    IMPRESSION: No drainable fluid collection within the area of interest  in the left axilla. Mild soft tissue edema.    I have personally reviewed the examination and initial interpretation  and I agree with the findings.    REJI VIVAS DO         SYSTEM ID:  [id number]   US Lower Extremity Venous Duplex Left    Narrative    EXAMINATION: DOPPLER VENOUS ULTRASOUND OF THE LEFT LOWER EXTREMITY,  10/28/2023 4:40 PM     COMPARISON: Lower extremity venous ultrasound 7/26/2023    HISTORY: Assess for DVT, unilateral left leg swelling    TECHNIQUE:  Gray-scale evaluation with compression, spectral flow, and  color Doppler assessment of the deep venous system of the proximal  right leg and left leg from groin to knee, and then at the ankle.    FINDINGS:  In the left lower extremity, the common femoral, femoral, popliteal  and posterior tibial veins demonstrate normal compressibility and  blood flow.    Within the left groin there is a 1.5 x 0.4 x 1.0 lymph node, within  normal limits.    In the proximal right lower extremity, the common femoral vein  demonstrates normal compressibility and blood flow.      Impression    IMPRESSION:  1.  No evidence left lower extremity deep venous thrombosis.    I have personally reviewed the examination and initial interpretation  and I agree with the findings.    REJI VIVAS DO         SYSTEM ID:  [id number]   Phosphorus   Result Value Ref Range    Phosphorus 1.1 (L) 2.5 - 4.5 mg/dL   Phosphorus   Result Value Ref Range    Phosphorus 2.2 (L) 2.5 - 4.5 mg/dL   CBC with Platelets & Differential    Narrative    The following orders were created for panel order CBC with Platelets &  Differential.  Procedure                               Abnormality         Status                     ---------                               -----------         ------                     CBC with platelets and d...[[id number]]  Abnormal            Final result               Manual Differential[[id number]]          Abnormal            Final result                 Please view results for these tests on the individual orders.   Comprehensive metabolic panel   Result Value Ref Range    Sodium 135 135 - 145 mmol/L    Potassium 3.4 3.4 - 5.3 mmol/L    Carbon Dioxide (CO2) 23 22 - 29 mmol/L    Anion Gap 13 7 - 15 mmol/L    Urea Nitrogen 15.7 8.0 - 23.0 mg/dL    Creatinine 2.51 (H) 0.51 - 0.95 mg/dL    GFR Estimate 21 (L) >60 mL/min/1.73m2    Calcium 8.5 (L) 8.8 - 10.2 mg/dL    Chloride 99 98 - 107 mmol/L    Glucose 136 (H) 70 - 99 mg/dL    Alkaline Phosphatase 114 (H) 35 - 104 U/L    AST 12 0 - 45 U/L    ALT 15 0 - 50 U/L    Protein Total 5.8 (L) 6.4 - 8.3 g/dL    Albumin 3.0 (L) 3.5 - 5.2 g/dL    Bilirubin Total 0.8 <=1.2 mg/dL   Phosphorus   Result Value Ref Range    Phosphorus 3.6 2.5 - 4.5 mg/dL   Erythrocyte sedimentation rate auto   Result Value Ref Range    Erythrocyte Sedimentation Rate 66 (H) 0 - 30 mm/hr   CBC with platelets and differential   Result Value Ref Range    WBC Count 5.2 4.0 - 11.0 10e3/uL    RBC Count 2.51 (L) 3.80 - 5.20 10e6/uL    Hemoglobin 8.2 (L) 11.7 - 15.7 g/dL    Hematocrit 26.0 (L) 35.0 - 47.0 %     (H) 78 - 100 fL    MCH 32.7 26.5 - 33.0 pg    MCHC 31.5 31.5 - 36.5 g/dL    RDW 17.4 (H) 10.0 - 15.0 %    Platelet Count 133 (L) 150 - 450 10e3/uL    % Neutrophils      % Lymphocytes      % Monocytes      % Eosinophils      % Basophils      % Immature Granulocytes      NRBCs per 100 WBC 0 <1 /100    Absolute Neutrophils      Absolute Lymphocytes      Absolute Monocytes      Absolute Eosinophils      Absolute Basophils      Absolute Immature Granulocytes      Absolute NRBCs 0.0 10e3/uL    Manual Differential   Result Value Ref Range    % Neutrophils 96 %    % Lymphocytes 2 %    % Monocytes 1 %    % Eosinophils 0 %    % Basophils 1 %    Absolute Neutrophils 5.0 1.6 - 8.3 10e3/uL    Absolute Lymphocytes 0.1 (L) 0.8 - 5.3 10e3/uL    Absolute Monocytes 0.1 0.0 - 1.3 10e3/uL    Absolute Eosinophils 0.0 0.0 - 0.7 10e3/uL    Absolute Basophils 0.1 0.0 - 0.2 10e3/uL    RBC Morphology Confirmed RBC Indices     Platelet Assessment  Automated Count Confirmed. Platelet morphology is normal.     Automated Count Confirmed. Platelet morphology is normal.    [place] Cells Slight (A) None Seen    RBC Fragments Slight (A) None Seen     *Note: Due to a large number of results and/or encounters for the requested time period, some results have not been displayed. A complete set of results can be found in Results Review.

## 2023-10-29 NOTE — PLAN OF CARE
Goal Outcome Evaluation:      Plan of Care Reviewed With: patient    Overall Patient Progress: improving    Outcome Evaluation: Pt A&O. VSS on 2-4L O2 NC. Up w/ A1 to BS commode. Denies pain. Rash improving. LUE edematous, elevating with pillow. Tessalon given for cough. Had 4 loose stools this shift, MD paged. Took a shower today. Will cont to monitor.

## 2023-10-30 NOTE — PROGRESS NOTES
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 Glasgow. Run time: 3.5 hrs. EDW 59.7 kg. Access: L AVF.  - Dialysis per MWF schedule, will consider extra UF run tomorrow (not to interfere with scheduled bronch)  - Significant pain on fistula arm after using alcohol on it; tried benadryl initially with no improvement; then ordered Emla cream which helped significantly    # pAfib: on BB, does not appear to be on AC    # BP/Volume: EDW 59.7 kg, pt maintains this very well, however currently volume up from volume resuscitation and inability to remove fluid on HD. PTA metoprolol 25 mg bid, on occasion pt requires prn midodrine however generally her BP's are in 150-170's. Minimal UOP  - CXR with pulm edema, O2 98% on 2L, appears hypervolemic with 2-3+ edema of R arm and leg  - UF 3 kg today, stable  - ordered prn midodrine 10 mg    # L upper and lower extremity > R upper and lower extremity:  - US ruled out DVT  - not related to fistula as we are getting good flows and no issues  - further workup underway by primary team    # Anemia of CKD: hgb dropping, will restart TASH. PTA on Mirerca 75mcg [id number] (last dose 9/27, due for next dose); venofer 50 mg qweek.  -start epogen 6000 units per HD  - hold iron in setting of infection    # BMD: Ca 8's, phos 4.0, alb 3.1; PTA on PO calcitriol 0.75 mcg MWF, Phoslo 2 tabs tid WM    # ID: on zosyn, azithromycin, posaconazole        Recommendations were communicated to primary team via this note         Adriana  MERRICK Ziegler   Division of Renal Disease and Hypertension  [phone]    Interval History :   Seen on dialysis, stable run for 3 kg. Pt had significant pain on fistula arm after using alcohol on it. No improvement with benadryl, however significant relief with Emla cream. No n/v, CP, chills. Having bronch tomorrow, concern for recurrent bronchial stenosis      Review of Systems:   4 point ROS neg other than as noted above    Physical Exam:   No intake/output data recorded.   /70 (BP Location: Right arm)   Pulse 85   Temp 97.4  F (36.3  C) (Oral)   Resp 24   Wt 66.8 kg (147 lb 4.3 oz)   LMP 06/07/2014 (Exact Date)   SpO2 100%   BMI 26.09 kg/m       GENERAL APPEARANCE: alert, significant pain near fistula after using alcohol on it  EYES: no scleral icterus, pupils equal  Pulmonary: course  CV: afib   - Edema 2-3+ L upper and lower extremities  GI: soft, non-tender   MS: no evidence of inflammation in joints, no muscle tenderness  : no rivas  NEURO: face symmetric, a/o3  Access: L AVF    Labs:   All labs reviewed by me  Electrolytes/Renal -   Recent Labs   Lab Test 10/30/23  0652 10/29/23  0642 10/28/23  2129 10/28/23  1641 10/28/23  0725 10/27/23  0701 10/26/23  2132 10/25/23  1356 10/24/23  1033 10/17/23  1035 10/17/23  1011    135  --   --  127*   < >  --    < > 138   < > 139   POTASSIUM 3.4 3.4  --   --  3.2*   < >  --    < > 3.7   < > 4.4   CHLORIDE 101 99  --   --  92*   < >  --    < > 96*  --  99   CO2 23 23  --   --  21*   < >  --    < > 31*  --  30*   BUN 26.4* 15.7  --   --  27.7*   < >  --    < > 14.6  --  22.1   CR 3.44* 2.51*  --   --  4.02*   < >  --    < > 3.43*  --  3.55*   * 136*  --   --  95   < >  --    < > 120*   < > 85   CHELSEY 8.9 8.5*  --   --  8.7*   < >  --    < > 9.0  --  8.9   MAG  --   --   --   --   --   --  1.5*  --  1.9  --  1.9   PHOS 4.0 3.6 2.2*   < > 1.9*   < >  --    < >  --   --   --     < > = values in this interval not displayed.       CBC -   Recent  Labs   Lab Test 10/30/23  0652 10/29/23  0642 10/28/23  0725   WBC 5.0 5.2 6.1   HGB 8.7* 8.2* 8.2*    133* 155       LFTs -   Recent Labs   Lab Test 10/30/23  0652 10/29/23  0642 10/28/23  0725   ALKPHOS 114* 114* 107*   BILITOTAL 0.7 0.8 0.9   ALT 20 15 16   AST 12 12 12   PROTTOTAL 6.0* 5.8* 5.5*   ALBUMIN 3.1* 3.0* 2.7*       Iron Panel -   Recent Labs   Lab Test 02/03/21  0415 12/13/18  1033 08/01/18  0921   IRON 51 16* 93   IRONSAT 36 7* 37   YOLA  --  302* 571*         Imaging:  Reviewed    Current Medications:   acetylcysteine  2 mL Nebulization TID    azaTHIOprine  50 mg Oral QPM    azithromycin  250 mg Oral Daily    [Held by provider] calcium acetate  667 mg Oral TID w/meals    fluticasone-vilanterol  1 puff Inhalation Daily    heparin ANTICOAGULANT  5,000 Units Subcutaneous Q12H    hydrocortisone   Topical BID    levalbuterol  1.25 mg Nebulization 3 times daily    [Held by provider] metoprolol tartrate  25 mg Oral or Feeding Tube BID    midodrine  10 mg Oral TID w/meals    montelukast  10 mg Oral At Bedtime    multivitamin RENAL  1 tablet Oral Daily    pantoprazole  40 mg Oral QAM AC    piperacillin-tazobactam  2.25 g Intravenous Q6H    posaconazole  300 mg Oral Daily    [START ON 10/31/2023] predniSONE  15 mg Oral QAM    Followed by    [START ON 11/3/2023] predniSONE  10 mg Oral QAM    Followed by    [START ON 11/6/2023] predniSONE  5 mg Oral QAM    sulfamethoxazole-trimethoprim  1 tablet Oral Once per day on Monday Wednesday Friday    [START ON 10/31/2023] tacrolimus  0.5 mg Oral BID IS    triamcinolone   Topical BID    valGANciclovir  450 mg Oral Daily    Vitamin D3  50 mcg Oral Daily       MERRICK Coronado

## 2023-10-30 NOTE — PROGRESS NOTES
[facility]    Medicine Progress Note - Medicine Service, JOSE TEAM 3    Date of Admission:  10/25/2023    Assessment & Plan   Kecia Blue is a 62 year old female admitted on 10/25/2023. 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    Today:  - LUE doppler venous US 10/30 to reevaluate for clot  - Restart azithromycin for CLAD  - C/w with prednisone burst (begin 15 mg dose Tues 10/31)  - Interventional pulm to complete repeat bronchoscopy on 11/1/23    #LUE swelling, unchanged  #LLE swelling, stable- improved  #L breast rash, chronic >1 yr  Pt's L arm appearing asymmetrically swollen compared to R arm, initially noted 10/27/23. L arm swelling was a notable change from previous days of hospitalization.  On 10/28, LLE also more swollen and erythematous vs RLE.  Edema significant to the point that wedding ring had to be cut off with mechanical saw. D/t presence of unilateral extremity swelling, there is concern for DVT.  LUE US performed 10/27/23 returned negative for DVT. CT PE 10/27/23 returned negative for PE. Pt also has approx 1 yr history of peau d'orange appearance of L breast- of note had mammogram in 02/2023 which was negative for malignancy. Compressive mass contributing to SVC picture considered as cause of unilateral edema- discussed with radiology, who recommended CT a/p to look for additional lymphadenopathy, along with possibility of CTV on Monday to assess for SVC  syndrome. Pt also at risk of post transplant lymphoproliferative disorders given hx of b/l lung transplant c/b EBV virema. Discussed c/f fistula issue v. Steal syndrome with nephrology who after chart review and physical exam feels confident this is not source of edema; no indication to pursue fistulogram at this point. Also concern for manifestation of existing dermatomyositis, CK on 10/27 = 69.  Retroperitoneal lymphadenopathy that may be reactive noted on Abd CT 10/28, PTLD not ruled out, per pulm transplant team.  - Pulm transplant following:   - Appreciate pulm transplants perspective on PTLD as etiology of edema  - Consider CTV 10/30 to further investigate for SVC syndrome.    #Diffuse erythema, resolved  #Diffuse pruritus, resolved  #Hx dermatomyositis  In ED, pt noted to have blanchable erythema diffusely throughout torso and extremities. Appears was acute in onset. Had received zosyn prior to rash, along with Augmentin the day before. Had not yet been given vancomycin. Dermatology consulted; not felt to be drug rash. Rheumatology also consulted d/t concern that skin presentation could be 2/2 dermatomyositis flare. Per Rheum, unlikely to be acute flare, however, reasonable to treat rash with steroids, which was discussed and approved with transplant pulmonology.  - Dermatology following  - Rheumatology following:   -Dermatomyositis serologies pending   -Prednisone 20mg x3 days, 15mg x3 days, 10mg x3 days then return to pta 5mg qday  - c/w Atarax 25mg q6h PRN for itching    #h/o C diff colitis  Pt appears to have remote hx of c. Diff colitis. 6 BM's noted overnight.   - C diff toxin PCR with reflex antigen   - Enteric bacteria and virus panel PCR    #Acute hypoxic respiratory failure  #Recurrent right mainstem bronchial stenosis s/p several dilations  #Pneumonia, improving  #Multifocal progressive pulmonary consolidations  #Actinomyces (+) BAL (10/17)  #ILD 2/2 anti-synthetase syndrome s/p B/L lung transplant  3/2018  #Hx Stenotrophomonas pneumonia  Pt with 2month hx of cough c/f pneumonia. Hx BL lung transplant (2018) c/b right mainstem bronchial stenosis requiring serial treatments with balloon bronchoplasty procedures, most recently on 10/17/23.  CT Chest from 10/24 notable for worsening consolidation BL c/f worsening infection, along with new prominent KONRAD soft tissue thickening. BAL on 10/17 notable actinomyces. Pt also has hx of stenotrophomonas from 08/2023. Completing infectious work-up with UA, Bcx x2. MRSA/MSSA PCR 10/25/23 negative.  Blood cultures collected 10/25/23, pending results. CrAg, HBV, Respiratory panel PCR 10/26 returned negative. CXR 10/27/23 demonstrating bilateral perihilar airspace opacification, small bilateral pleural effusions, no pneumothorax. Chest CT 10/27/23 demonstrating decreased edema in lungs with continued small pleural effusions, suggestive of improving atypical infection. Echo 10/27/23 showing normal appearing RV, pulmonary hypertension noted. Cough improving w/ use of tessalon pearles. Remaining transplant ID and transplant pulmonary work-up pending results.  - C/w tessalon pearles 100mg q4h PRN  -Scheduled nebs for breathing  -Pulmonary transplant team following:   -Wean supplemental O2 PRN to maintain o2 saturations >92%  -Interventional pulm consulted:   -bronchoscopy scheduled for 11/1/23  -Transplant ID following:  -c/w zosyn (10/25-p) for 2 weeks    -Can transition to Augmentin at time of discharge   -awaiting results from:  -Urine histoplasma antigen quant  -Sputum culture  -AFB culture   -Nocardia culture  -Fungitell     #CLAD   PTA on azithromycin, singulair, advair. Azithromycin held for period during current admission d/t prolonged Qtc on EKG.   -Restart azithromycin given normalization of Qtc.  -c/w singulair + advair     #Recurrent transient hypotension w/o altered mentation, improving  Rapid response was called overnight on 10/26 and 10/27 d/t hypotension,  tachycardia, and tachypnea. Doppler venous US of bilateral lower extremities on 10/26 with no DVT. PTA metoprolol held. Lactate 1.2. Temperature remains < 100.4* F. Hypotension responsive to albumin and midodrine. Bedside cardiac ultrasound  10/27 with collapsible IVC. Main concern on differential remains sepsis 2/2 suspected pulmonary infection, drug reaction, or possible adrenal insufficiency. However, episodic hypotension may be within Pt's normal range, as Pt continues to remain asymptomatic during RRTs. TTE completed 10/27 w/ EF 60-65%. Pulmonary hypertension noted. Of note, started on steroids for rash on 10/28, no RRTs since 10/28.  - C/w midodrine 10 mg TID     #Immunosuppressed patient  #CMV viremia  #Hx EBV viremia  #Hx PJP  -Pulm transplant following:   -DSA and ImmuKnow (10/27) pending  -CMV + EMV levels per transplant pulm   -c/w azathioprine 50mg qday.   -c/w tacrolimus. Check levels per translplant pulm   -c/w Bactrim for PJP ppx  -ID transplant following:  -c/w valgancyclovir 450 mg daily for 3 weeks (10/27-p), or until x2 negative CMV checks, whichever is longer. Then return to valgancyclovir three times weekly on dialysis days after receiving dialysis.     #Prolongation of Qtc, resolved  #Arrythmia (Afib)  EKG 10/26/23 with prolonged Igy=572; normalized to 455 on EKG 10/30.  EKG 10/27 demonstrating Afib.    -Repeat EKG later this week (ordered for Thurs 11/2)    #ESRD on HD (Tu, Th, Sa)  #Anemia of CKD, macrocytic  #Hypophosphatemia, resolved  #Hx Hyperphosphatemia  Hx of ESRD on HD, currently M, W, F.  Phoslo held on 10/29 due downtrending phos; recheck notable for phos 1.1; required IV + PO supplementation with resolution of hypophosphatemia. Hg trending downwards, will appreciate nephrology recs.  -C/w iHD per renal dialysis team  -ESRD service consulted:   -c/w pta vitamins   -Epo/iron per nephrology   -hold phoslo d/t hypophosphatemia  -Renal panel daily    #Hx Left Sided Aspergillus  Empyema:  First noted in 2019. Needle aspiration confirmatory of aspergillus fumigatus, now s/p intrapleural amphotericin bead placement. Discussions had regarding surgery, however, surgery felt to be associated with too high of morbidity, and indefinite posaconazole decided as plan.  -Transplant pulmonology following   - c/w PTA posaconazole. Check levels per transplant pulm.    Diet: Renal Diet (dialysis)    DVT Prophylaxis: Heparin SQ  Lines: None     Cardiac Monitoring: None  Code Status: Full Code      Disposition Plan      Expected Discharge Date: 11/01/2023      Destination: home with family  Discharge Comments: Dispo: home w/ family, OP HD  Plan: Pending further infectious work-up; possible bronchoscopy          The patient's care was discussed with the Attending Physician, Dr. Emanuel .    Resident/Fellow Attestation   I, Sheeba Nesbitt MD, was present with the medical/MARIA A student who participated in the service and in the documentation of the note.  I have verified the history and personally performed the physical exam and medical decision making.  I agree with the assessment and plan of care as documented in the note.      Sheeba Nesbitt MD  PGY1  Date of Service (when I saw the patient): 10/30/23     [facility]  Medicine Service, [facility] TEAM [address] [place] [address]  Securely message with [employer] (more info)  Text page via [employer] Paging/Directory   See signed in provider for up to date coverage information  ______________________________________________________________________    Interval History   - No RRTs overnight  - 6 bowel movements overnight  - LUE remains swollen and painful      Physical Exam   Vital Signs: Temp: 97.6  F (36.4  C) Temp src: Oral BP: 118/82 Pulse: 91   Resp: 29 SpO2: 100 % O2 Device: Nasal cannula Oxygen Delivery: 2 LPM  Weight: 147 lbs 4.28 oz    General Appearance: Awake, alert, in no apparent acute distress  Respiratory: coarse breath  sounds bilaterally. Nonlabored breathing on 2L NC.  Cardiovascular: BLE edema noted  GI: abdomen soft, non-tender  Skin: Less erythematous than previous. Skin noted to appear dry, scaly over locations of previous erythema.  Extremities: LUE remains swollen.     Data   NOTE: Data reviewed over the past 24 hrs contributes toward MDM complexity

## 2023-10-30 NOTE — PROGRESS NOTES
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  The patient was admitted on 10/25/2023 with malaise, cough, and dyspnea along with 2 weeks of new hypoxia, CT chest concerning for uncontrolled infection.  Also with LUE swelling and erythema, worsened 10/27.     Today's recommendations:  - IP consult for consideration of repeat bronch (+cultures) this week  - Follow pending blood cultures (10/25)  - Sputum cultures, Histo urine Ag, and UA/UC ordered pending collection  - ABX per transplant ID, low threshold to broaden with clinical decline  - Wean supplemental oxygen to maintain SpO2>92%  - CXR ordered today  - DSA (10/27) pending  - Tacrolimus level supratherapeutic, hold dose this evening and resume at decreased dose tomorrow, daily level ordered through  for close monitoring  - Repeat ImmuKnow in 4 weeks (, not yet ordered)  - Plan to resume azithromycin and repeat EKG for QTc monitoring in 2-3 days  - Continue posaconazole, repeat level (10/27) pending  - VGCV per transplant ID, repeat CMV level ordered 10/31  - EBV (10/28) pending  - Further imaging (in setting of LUE edema) per primary team, consider vascular surgery consult    Right main bronchial stenosis s/p recurrent dilatation: Follows with IP for serial bronchs with dilation.  Last bronch (10/17) with dilation.  Patient reports marked improvement for approx 24h  after the dilation but then rapid return of symptoms.  Then with coarse inspiratory/expiratory wheeze on exam, concerning for recurrent stenosis.   - IP consult for consideration of bronch this week to evaluate for recurrent stenosis  - Airway clearance as below (PTA albuterol nebs q6h prn, Mucomyst nebs BID prn, and Pulmicort neb daily prn)  - Per transplant ID: recommend post-dilation course of Augmentin for ~2 weeks in the future (or alternatively, 1 week out of each month)    Acute hypoxic respiratory failure:  Concern for pneumonia:  Hypotension:  Arrhythmia: Progressive malaise, productive cough of clear sputum, and dyspnea for 2 months with occasional wheezing.  S/p bronch/BAL (8/17) with Steno s/p Levaquin and Bactrim without improvement.  CT chest (10/11, [facility] overread) with increased GGO and nodules t/o RML and KONRAD.  New hypoxia at home (2L NC x 2 weeks).  S/p OR bronch/BAL (10/17) with RM stenosis dilation (as below).  Felt well for 24 hours then symptoms returned.  Bronch culture (10/17) with Actinomyces sp. and started on Augmentin the day PTA.  CT chest (10/24) with worsening consolidation bilaterally concerning for worsening infection, new prominent left upper lobe anterior pleural-based soft tissue thickening, mild air trapping.  Low grade temp (100 degree F on 10/25), leukocytosis (3.9 --> 11.6), LA 1.1, and procal 0.7.  MRSA nares (10/25), Cryptococcal Ag (10/26), and respiratory panel (10/27) all negative.  DDx includes pneumonia/infection, PE (tachycardia and hypotension, extremity US negative DVT, echo with normal appearing RV although pulmonary HTN noted), worsening CLAD, rejection (DSA pending as below, cannot rule out ACR), cardiac, hypervolemia (missed dialysis 10/25, CXR 10/25 with pulmonary edema).  Significant LUE swelling 10/27, see below.  CT PE 10/27 with no PE, noted decreased edema in the lung with continued small pleural effusions, cardiomegaly, and left breast skin thickening and  breast edema.  No significant improvement in symptoms with current ABX.    - Blood cultures (10/25) NGTD  - Bacterial, fungal, AFB, and Nocardia sputum cultures ordered pending collection  - Histo urine Ag, UA/UC ordered pending collection  - Encourage IS and Aerobika  - Nebs: Xopenex and Mucomyst TID (10/26)   - ABX: IV Zosyn (10/25) per transplant ID and anticipate 2 week course and probably deescalate to Augmentin once she improves for discharge; s/p minocycline (10/25-10/26) and vancomycin (10/25) discontinued per transplant ID, low threshold to resume or revisit ABX regimen with clinical decline, if vancomycin resumed premedicate with antihistamine and slow infusion time given vancomycin-infusion erythrodermic pruritic reaction on 10/25  - Supplemental O2 to maintain SpO2>92%, wean as able  - CXR ordered today  - IP consult for consideration of repeat bronch (+cultures) as above     S/p bilateral sequential lung transplant (BSLT) for ILD: Pulmonary clinic visit with Ame Chow 10/24 with increased cough and dyspena, occasional wheezing, as above.  PFTs decreased.  DSA negative 8/8.  IgG adequate 979 on 10/27.   - DSA (10/27) pending     Immunosuppression: Recent ImmuKnow 479 on 9/7, suggesting increased risk of rejection, repeat 412 on 10/27.  - Tacrolimus 0.5 mg qAM / 1 mg qPM (Pt. missed 10/25 AM dose).  Goal level 8-10.  Level 10/30 supratherapeutic at 13.2, hold dose this evening and resume at decreased dose of 0.5 mg BID starting 10/31, daily level ordered through 11/2 for close monitoring.  - AZA 50 mg daily (decreased 10/26 from 75 mg daily given concern for infection)  - Prednisone held with burst/taper as below (chronic dose 5 mg daily)  - Repeat ImmuKnow in 4 weeks (11/24, not yet ordered)     Prophylaxis:   - Bactrim MWF for PJP ppx     CLAD: PTA azithromycin held 10/27 given prolonged QTc --> repeat QTc 10/30 improved to 455, plan to resume and repeat EKG for QTc monitoring in 2-3 days, Singulair,  and Breo (hospital substitute for [facility])     Left-sided Aspergillus empyema: Noted in 2019 s/p needle aspiration with Aspergillus fumigatus on cultures s/p intrapleural amphotericin bead placement.  Posaconazole indefinitely per transplant ID, most recent level 1 on 2/9.  Fungitell (10/27) and A. galactomannan (10/26) negative.  - PTA PO posaconazole, repeat level (10/27) pending     Recurrent CMV viremia: Positive at 2,450 on 10/4 and peaked at 2,680 on 10/11, down to  on 10/24.  - CMV weekly, ordered 10/31  - PTA VGCV induction dosing (450 mg daily increased 10/27 per transplant ID, prior 450 mg MWF after HD) increased dose for 3 weeks or until viral load negative x2 checks, then back to 3 times weekly after HD dosing after that     EBV viremia: EBV <35 on 10/11, from 42k with log 4.6 on 8/8.    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  Repeat LUE US (10/27) without DVT although noted left cervical lymphadenopathy and subcutaneous soft tissue thickening in the distal upper extremity.   CT AP (10/28, given left lymphadenopathy/extremity swelling) with basilar airspace consolidation and superimposed pulmonary edema, asymmetric anasarca (L>R) and trace ascites, mildly prominent retroperitoneal lymphadenopathy, and cholelithiasis without acute cholecystitis.  Rheumatology consulted 10/28, see their note for details.    - Prednisone burst/taper (10/28) per primary team, chronic dose ordered to resume 11/6  - Further imaging per primary team, consider vascular surgery consult     ESRD on iHD (since 10/2019): On MWF iHD.  Renal transplant evaluation on hold currently for infection and worsening PFTs of unknown etiology.  Missed dialysis run on  10/25.  Management per nephrology.    We appreciate the excellent care provided by the [facility] team.  Recommendations communicated via telephone and this note.  Will continue to follow along closely, please do not hesitate to call with any questions or concerns.    Patient discussed with Dr. Orona.    Cindy Mcadams, PA-C  [facility]  Pulmonary CF/Transplant     Subjective & Interval History:     On 2L NC, denies dyspnea.  Cough nonproductive, denies chest congestion or pain.  Having LUE pain since starting dialysis this morning, swelling and redness ongoing, started to have some peeling this morning.  Improving loose stools.      Review of Systems:     C: No fever, no chills  INTEGUMENTARY/SKIN: See interval history  ENT/MOUTH: No sore throat, no sinus pain, no nasal congestion or drainage  RESP: See interval history  CV: + peripheral edema  GI: No nausea, no vomiting, no reflux symptoms  : + oliguric, no dysuria   MUSCULOSKELETAL: See interval history  ENDOCRINE: Blood sugars with adequate control  NEURO: No headache, no numbness or tingling  PSYCHIATRIC: Mood stable    Physical Exam:     All notes, images, and labs from past 24 hours (at minimum) were reviewed.    Vital signs:  Temp: 97.7  F (36.5  C) Temp src: Oral BP: (!) 132/104 Pulse: (!) 125   Resp: (!) 38 SpO2: 98 % O2 Device: Nasal cannula Oxygen Delivery: 2 LPM   Weight: 66.8 kg (147 lb 4.3 oz)  I/O:   Intake/Output Summary (Last 24 hours) at 10/30/2023 1153  Last data filed at 10/30/2023 1145  Gross per 24 hour   Intake 0 ml   Output 3 ml   Net -3 ml     Constitutional: Lying in bed at dialysis, in no apparent distress.   HEENT: Eyes with pink conjunctivae, anicteric.  Oral mucosa moist without lesions.   PULM: Diminished air flow bilaterally, >bases.  Minimal crackles, occasional wheeze.  No rhonchi.  Non-labored breathing on 2L NC.  CV: Tachycardic.  + LUE edema and BLE edema.   ABD: NABS, soft, nontender, nondistended.    MSK: Moves all  "extremities.    NEURO: Alert and conversant.   SKIN: Warm, dry.  + LUE erythema with some areas of desquamation.   PSYCH: Mood stable.     Data:     LABS    CMP:   Recent Labs   Lab 10/30/23  0652 10/29/23  0642 10/28/23  2129 10/28/23  1641 10/28/23  0725 10/27/23  0701 10/26/23  2132 10/25/23  1356 10/24/23  1033    135  --   --  127* 133*  --    < > 138   POTASSIUM 3.4 3.4  --   --  3.2* 3.5  --    < > 3.7   CHLORIDE 101 99  --   --  92* 95*  --    < > 96*   CO2 23 23  --   --  21* 26  --    < > 31*   ANIONGAP 13 13  --   --  14 12  --    < > 11   * 136*  --   --  95 100*  --    < > 120*   BUN 26.4* 15.7  --   --  27.7* 16.0  --    < > 14.6   CR 3.44* 2.51*  --   --  4.02* 3.00*  --    < > 3.43*   GFRESTIMATED 15* 21*  --   --  12* 17*  --    < > 15*   CHELSEY 8.9 8.5*  --   --  8.7* 8.9  --    < > 9.0   MAG  --   --   --   --   --   --  1.5*  --  1.9   PHOS 4.0 3.6 2.2* 1.1* 1.9* 2.3*  --    < >  --    PROTTOTAL 6.0* 5.8*  --   --  5.5* 6.3*  --    < > 7.4   ALBUMIN 3.1* 3.0*  --   --  2.7* 3.2*  --    < > 3.6   BILITOTAL 0.7 0.8  --   --  0.9 1.2  --    < > 0.9   ALKPHOS 114* 114*  --   --  107* 141*  --    < > 306*   AST 12 12  --   --  12 17  --    < > 39   ALT 20 15  --   --  16 20  --    < > 31    < > = values in this interval not displayed.     CBC:   Recent Labs   Lab 10/30/23  0652 10/29/23  0642 10/28/23  0725 10/27/23  0701   WBC 5.0 5.2 6.1 7.6   RBC 2.64* 2.51* 2.49* 2.81*   HGB 8.7* 8.2* 8.2* 9.3*   HCT 27.1* 26.0* 25.5* 29.4*   * 104* 102* 105*   MCH 33.0 32.7 32.9 33.1*   MCHC 32.1 31.5 32.2 31.6   RDW 17.3* 17.4* 17.3* 17.2*    133* 155 149*       INR:   Recent Labs   Lab 10/24/23  1033   INR 0.99       Glucose:   Recent Labs   Lab 10/30/23  0652 10/29/23  0642 10/28/23  0725 10/27/23  0701 10/26/23  0606 10/26/23  0054   * 136* 95 100* 75 81       Blood Gas: No lab results found in last 7 days.    Culture Data No results for input(s): \"CULT\" in the last 168 " hours.    Virology Data:   Lab Results   Component Value Date    FLUAH1 Not Detected 10/27/2023    FLUAH3 Not Detected 10/27/2023    OC0078 Not Detected 10/27/2023    IFLUB Not Detected 10/27/2023    RSVA Not Detected 10/27/2023    RSVB Not Detected 10/27/2023    PIV1 Not Detected 10/27/2023    PIV2 Not Detected 10/27/2023    PIV3 Not Detected 10/27/2023    HMPV Not Detected 10/27/2023    HRVS Negative 01/24/2021    ADVBE Negative 01/24/2021    ADVC Negative 01/24/2021    ADVC Negative 12/23/2020    ADVC Negative 10/07/2019       Historical CMV results (last 3 of prior testing):  Lab Results   Component Value Date    CMVQNT Not Detected 07/11/2023    CMVQNT Not Detected 04/06/2023    CMVQNT <137 (A) 02/09/2023     Lab Results   Component Value Date    CMVLOG 2.0 10/24/2023    CMVLOG 1.9 10/24/2023    CMVLOG 1.9 10/17/2023       Urine Studies    Recent Labs   Lab Test 01/24/21  1729 10/21/19  2240   URINEPH 5.0 5.0   NITRITE Negative Negative   LEUKEST Moderate* Large*   WBCU 34* 115*       Most Recent [place]ze Pulmonary Function Testing (FVC/FEV1 only)  FVC-Pre   Date Value Ref Range Status   10/24/2023 0.96 L    09/07/2023 1.25 L    08/08/2023 1.35 L    07/11/2023 1.47 L      FVC-%Pred-Pre   Date Value Ref Range Status   10/24/2023 33 %    09/07/2023 42 %    08/08/2023 46 %    07/11/2023 50 %      FEV1-Pre   Date Value Ref Range Status   10/24/2023 0.87 L    09/07/2023 1.07 L    08/08/2023 1.12 L    07/11/2023 1.16 L      FEV1-%Pred-Pre   Date Value Ref Range Status   10/24/2023 37 %    09/07/2023 46 %    08/08/2023 48 %    07/11/2023 49 %        IMAGING    Recent Results (from the past 48 hour(s))   CT Abdomen Pelvis w/o Contrast    Narrative    EXAMINATION: CT ABDOMEN PELVIS W/O CONTRAST  10/28/2023 2:12 PM      CLINICAL HISTORY: new L lymphadenopathy, L sided extremity swelling,  look for additional LAD    COMPARISON: 2/9/2021, 10/27/2023    PROCEDURE COMMENTS: CT of the abdomen was performed without   intravenous  or oral contrast. Coronal and sagittal reformatted images  were obtained.    FINDINGS:  Lower thorax:   Status post lung transplant. There is a airspace consolidation in the  lower lobes, right middle lobe and lingula with small pleural  effusions and compressive atelectasis. Calcification in the medial  left lower lobe pleural space is unchanged. Calcified right hilar  lymph node. Cardiomegaly and trace pericardial effusion. Persistent  abnormal skin thickening of the left breast is partially visualized.  Partially imaged sternotomy wires.    Abdomen and pelvis:  Hepatomegaly without cirrhotic morphology. Cholelithiasis without  acute cholecystitis, however the gallbladder is mildly hydropic.  Normal caliber biliary system. Normal pancreas. Borderline prominent  spleen. Normal adrenal glands. Atrophic kidneys with a cyst noted  within the lower pole right kidney and bilateral hilar vascular  calcifications.     Decompressed urinary bladder with intravesicular contrast.     Rectosigmoid distention with liquid stool. Fluid-filled small bowel  loops. Normal appendix.    Diffuse mesenteric edema and trace ascites. Normal-appearing uterus  and adnexa. Multiple prominent retroperitoneal lymph nodes are noted.  For example, a conglomeration of left periaortic nodes with  significant surrounding inflammatory fat stranding measuring up to 11  mm in short axis (series 2 images 38-51). No intra-abdominal free air.  Mild aortobiiliac atherosclerotic without aneurysm.    Osseous structures:   No acute abnormalities.      Impression    IMPRESSION:  1. Basilar airspace consolidation in the visualized lungs. There is  superimposed pulmonary edema.  2. Asymmetric anasarca, left greater than right, and trace ascites.  3. Mildly prominent retroperitoneal lymphadenopathy may be reactive,  however post transplant lymphoproliferative disease cannot be  excluded.  4. Cholelithiasis without acute cholecystitis.    I have personally  reviewed the examination and initial interpretation  and I agree with the findings.    REJI VIVAS DO         SYSTEM ID:  [id number]   US Upper Extremity Non Vascular Left    Narrative    EXAMINATION: Doppler evaluation of the left axilla.    COMPARISON: None.    HISTORY: Assess drainage, has unilateral left-sided swelling and axial    TECHNIQUE:  Gray-scale evaluation with color Doppler of the left  axilla.    FINDINGS: Within the area of interest at the left axilla there is mild  soft tissue edema. No drainable fluid collection.       Impression    IMPRESSION: No drainable fluid collection within the area of interest  in the left axilla. Mild soft tissue edema.    I have personally reviewed the examination and initial interpretation  and I agree with the findings.    REJI VIVAS DO         SYSTEM ID:  [id number]   US Lower Extremity Venous Duplex Left    Narrative    EXAMINATION: DOPPLER VENOUS ULTRASOUND OF THE LEFT LOWER EXTREMITY,  10/28/2023 4:40 PM     COMPARISON: Lower extremity venous ultrasound 7/26/2023    HISTORY: Assess for DVT, unilateral left leg swelling    TECHNIQUE:  Gray-scale evaluation with compression, spectral flow, and  color Doppler assessment of the deep venous system of the proximal  right leg and left leg from groin to knee, and then at the ankle.    FINDINGS:  In the left lower extremity, the common femoral, femoral, popliteal  and posterior tibial veins demonstrate normal compressibility and  blood flow.    Within the left groin there is a 1.5 x 0.4 x 1.0 lymph node, within  normal limits.    In the proximal right lower extremity, the common femoral vein  demonstrates normal compressibility and blood flow.      Impression    IMPRESSION:  1.  No evidence left lower extremity deep venous thrombosis.    I have personally reviewed the examination and initial interpretation  and I agree with the findings.    REJI VIVAS DO         SYSTEM ID:  [id number]

## 2023-10-30 NOTE — PROGRESS NOTES
Rheumatology Follow Up Note     Kecia Blue MRN# [id number]   YOB: 1960 Age: 62 year old     Date of Admission: 10/25/2023   Date of Service: October 30, 2023  Reason for consult: ? 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 She was found to have progressive pulmonary infiltrates with increased oxygen needs and shortly after arrival to the hospital she developed an acute onset erythematous skin rash with extremity swelling that continues to worsen. Rheumatology has been consulted to determine if this is an acute dermatomyositis flare and if immunosuppressive therapy should be started.      Overall suspicion remains very low for an acute flare of dermatomyositis given acuity and temporal relation to antibiotic administration,  no weakness, normal CK and aldolase and rash is not typical of dermatomyositis. Corticosteroids however can be helpful with a more widespread exanthem such as hers, thus she was started on a prednisone burst on 10/28 with improvement in her rash.     Would plan to continue prednisone burst to completion and have her follow up with rheumatology in the outpatient setting. Inpatient rheumatology will plan to sign off today but we will follow her labs peripherally (the myomarker panel will take at least 3 weeks to come back but the rest will result sooner which we will follow up).        DIAGNOSIS:  1. Dermatomyositis, anti-synthetase syndrome (+Jo1, + SSA/SSB) with ILD s/p bilateral lung transplant in 03/2018  2. Chronic cough with concern for post-obstructive PNA, treating with piperacillin-tazobactam  3. Acute onset generalized erythematous,  "pruritic skin rash - likely drug reaction, improved  4. Edema of extremities greater in LUE and LLE, less pronounced in RLE    PLAN:  -- Cont prednisone burst for management of rash (cleared with transplant ID team): 20 mg x3d (10/28-10/30), 15 mg x3d (10/31-11/2), 10 mg x3d (11/3-11/5) and then return to PTA 5 mg daily   -- We will plan to sign off today but will peripherally follow to monitor lab results (Felisa-1, scl-70, RNA tracy III, pm scl 100, myositis panel)  -- Please reach out to rheumatology team prior to discharge so we can coordinate outpatient rheumatology follow up; she would like to follow up here at [facility]   -- Please do not hesitate to reach out to rheumatology team with any questions or concerns    Discussed with attending, Dr. Galvan who agrees with assessment and plan.      \"This note was generated using dragon software and reviewed by myself.  Please excuse any grammatical or spelling errors above\".   -----------------------------------------------------------------------------------------------------------------    Interval History:   Overall her rash is much improved today and she is feeling better, however, is tired after dialysis. The swelling in her left arm has also gone down and she is now able to flex her arm at the elbow which she was not able to do before. She underwent HD today with reported 3L removed.     Of note, she mentions she is having a burning skin reaction to the agent used to clean her fistula site before HD. This is new for her this admission- topical lidocaine has been helping.     Remains on zosyn for pneumonia. Currently on 2 LPM O2.    ROS: 10 points reviewed and all negative except that mentioned in HPI.    Patient Active Problem List   Diagnosis    ILD (interstitial lung disease) (H)    S/P lung transplant (H)    Steroid-induced hyperglycemia    Deep vein thrombosis (DVT) (H) [I82.409]    Encounter for long-term (current) use of high-risk medication    Dehydration    " Acute kidney injury (H24)    Cytomegalovirus (CMV) viremia (H)    Nausea and vomiting    Low hemoglobin    Cholecystitis, unspecified    Empyema of lung (H)    Administration of long-term prophylactic antibiotics    RADHA (acute kidney injury) (H24)    Shortness of breath    Pulmonary hypertension (H)    Hemodialysis patient (H24)    Respiratory failure (H)    End stage renal failure on dialysis (H)    Stenosis of right bronchus    Pneumonia due to Pneumocystis jirovecii (H)    Episodic anisocoria    Loculated pleural effusion    Postobstructive pneumonia    Lung transplant status, bilateral (H)    Aspergillus pneumonia (H)    EBV (Franklin-Barr virus) viremia    Pre-diabetes    History of Pneumocystis jirovecii pneumonia    Immunosuppressed status (H24)    Current chronic use of systemic steroids    Age-related osteoporosis without current pathological fracture    ESRD (end stage renal disease) on dialysis (H)    Chronic kidney disease, unspecified    Adverse effect of other drugs, medicaments and biological substances, sequela    Peripheral neuropathy    Other specified disorders of bone density and structure, multiple sites    Other specified disorders involving the immune mechanism, not elsewhere classified (H24)    Abnormal level of blood mineral    Anemia in chronic kidney disease    Antisynthetase syndrome (H24)    BMI 20.0-20.9, adult    CMV (cytomegalovirus) antibody positive    Dermatomyositis (H)    Dermatopolymyositis, unspecified, organ involvement unspecified (H)    Dysplasia of cervix, low grade (ESTRADA 1)    Family history of diabetes mellitus type II    GERD (gastroesophageal reflux disease)    Gottron's papules    Current use of steroid medication    High risk medications (not anticoagulants) long-term use    History of total knee replacement    Iron deficiency anemia secondary to inadequate dietary iron intake    Long term (current) use of antibiotics    Low magnesium level    Osteopenia of multiple  sites    Other diseases of bronchus, not elsewhere classified    Other disorders of plasma-protein metabolism, not elsewhere classified    Other specified complication of vascular prosthetic devices, implants and grafts, initial encounter (H24)    Presence of right artificial knee joint    Primary osteoarthritis of left knee    Primary osteoarthritis of right knee    Pulmonary hypertension due to interstitial lung disease (H)    Secondary hyperparathyroidism of renal origin (H24)    Seronegative rheumatoid arthritis (H)    Steal syndrome of dialysis vascular access (H24)    Cytomegaloviral disease, unspecified (H)    End stage renal disease (H)    Dependence on renal dialysis (H24)    Lung interstitial disease (H)    Other specified abnormal immunological findings in serum    Dyspnea, unspecified type    Edema of left upper extremity    Actinomyces infection      Past Medical History:   Diagnosis Date    Acute on chronic respiratory failure with hypoxia (H) 02/21/2018    Anisocoria     Antisynthetase syndrome (H24) 2014    Anxiety     Aspergillus (H)     Aspergillus pneumonia (H) 11/20/2020    Benign essential hypertension     C. difficile colitis     Cytomegalovirus (CMV) viremia (H)     Dermatomyositis (H)     Dysplasia of cervix, low grade (ESTRADA 1)     EBV (Franklin-Barr virus) viremia     ESRD (end stage renal disease) on dialysis (H)     ILD (interstitial lung disease) (H)     Lung replaced by transplant (H)     Osteopenia     Paroxysmal atrial fibrillation (H)     Pneumocystis jiroveci pneumonia (H)     PONV (postoperative nausea and vomiting)     Post-menopause     Pulmonary hypertension (H)     Raynaud's disease     Seronegative rheumatoid arthritis (H)      Past Surgical History:   Procedure Laterality Date    BRONCHOSCOPY (RIGID OR FLEXIBLE), DIAGNOSTIC N/A 4/10/2018    Procedure: COMBINED BRONCHOSCOPY (RIGID OR FLEXIBLE), LAVAGE;;  Surgeon: Mariposa Donohue MD;  Location:  GI    BRONCHOSCOPY (RIGID OR  FLEXIBLE), DIAGNOSTIC N/A 12/23/2020    Procedure: BRONCHOSCOPY, WITH BRONCHOALVEOLAR LAVAGE;  Surgeon: Uri Bass MD;  [facility]    BRONCHOSCOPY (RIGID OR FLEXIBLE), DIAGNOSTIC N/A 5/26/2022    Procedure: BRONCHOSCOPY, WITH BRONCHOALVEOLAR LAVAGE;  Surgeon: Uri Bass MD;  [facility]: [facility]    BRONCHOSCOPY (RIGID OR FLEXIBLE), DIAGNOSTIC N/A 8/17/2023    Procedure: BRONCHOSCOPY, WITH BRONCHOALVEOLAR LAVAGE;  Surgeon: Esau Bruno MD;  [facility]: [facility]    BRONCHOSCOPY (RIGID OR FLEXIBLE), DILATE BRONCHUS / TRACHEA N/A 10/11/2018    Procedure: BRONCHOSCOPY (RIGID OR FLEXIBLE), DILATE BRONCHUS / TRACHEA;  Flexible And Rigid Bronchoscopy and Dilation;  Surgeon: Wilber Lin MD;  [facility]    BRONCHOSCOPY FLEXIBLE N/A 3/13/2018    Procedure: BRONCHOSCOPY FLEXIBLE;  Flexible Bronchoscopy ;  Surgeon: Gissell Sanchez MD;  [facility]    BRONCHOSCOPY FLEXIBLE N/A 5/9/2018    Procedure: BRONCHOSCOPY FLEXIBLE;;  Surgeon: Wilber Lin MD;  [facility]    BRONCHOSCOPY FLEXIBLE AND RIGID N/A 9/10/2018    Procedure: BRONCHOSCOPY FLEXIBLE AND RIGID;  Flexible and Rigid Bronchoscopy with Balloon Dilation, tissue debulking with cryo, and Right mainstem bronchus stent placement;  Surgeon: Wilber Lin MD;  [facility]    BRONCHOSCOPY RIGID N/A 6/6/2018    Procedure: BRONCHOSCOPY RIGID;;  Surgeon: Lopez Macias MD;  Location:  GI    BRONCHOSCOPY, DILATE BRONCHUS, STENT BRONCHUS, COMBINED N/A 6/11/2018    Procedure: COMBINED BRONCHOSCOPY, DILATE BRONCHUS, STENT BRONCHUS;  Flexible Bronchoscopy, Balloon Dilation, Bronchial Washings;  Surgeon: Wilber Lin MD;  [facility]    BRONCHOSCOPY, DILATE BRONCHUS, STENT BRONCHUS, COMBINED Right 7/10/2018    Procedure: COMBINED BRONCHOSCOPY, DILATE BRONCHUS, STENT BRONCHUS;  Flexible Bronchoscopy, Balloon Dilation, Bronchial Washings  ;  Surgeon: Wilber Lin MD;  [facility]     BRONCHOSCOPY, DILATE BRONCHUS, STENT BRONCHUS, COMBINED N/A 8/2/2018    Procedure: COMBINED BRONCHOSCOPY, DILATE BRONCHUS, STENT BRONCHUS;  Flexible Bronchoscopy, Bronchial Washings, Balloon Dilation;  Surgeon: Wilber Lin MD;  [facility]    BRONCHOSCOPY, DILATE BRONCHUS, STENT BRONCHUS, COMBINED N/A 8/20/2018    Procedure: COMBINED BRONCHOSCOPY, DILATE BRONCHUS, STENT BRONCHUS;  Flexible Bronchoscopy, Balloon Dilation;  Surgeon: Wilber Lin MD;  [facility]    BRONCHOSCOPY, DILATE BRONCHUS, STENT BRONCHUS, COMBINED N/A 10/29/2018    Procedure: Flexible Bronchoscopy, Balloon Dilation, Stent Revision, Airway Examination And Therapeutic Suctioning, Cyro Tumor Debulking;  Surgeon: Wilber Lin MD;  [facility]    BRONCHOSCOPY, DILATE BRONCHUS, STENT BRONCHUS, COMBINED N/A 11/20/2018    Procedure: Rigid Bronchoscopy, Stent Removal and dilitation;  Surgeon: Wilber Lin MD;  [facility]    BRONCHOSCOPY, DILATE BRONCHUS, STENT BRONCHUS, COMBINED N/A 12/14/2018    Procedure: Flexible And Rigid Bronchoscopy, Balloon Dilation, Bronchial Washing;  Surgeon: Wilber Lin MD;  [facility]    BRONCHOSCOPY, DILATE BRONCHUS, STENT BRONCHUS, COMBINED N/A 1/17/2019    Procedure: Flexible And Rigid Bronchoscopy, Balloon Dilation.;  Surgeon: Wilber Lin MD;  [facility]    BRONCHOSCOPY, DILATE BRONCHUS, STENT BRONCHUS, COMBINED N/A 3/7/2019    Procedure: Flexible and Rigid Bronchoscopy, Bronchial Washing, Balloon Dilation;  Surgeon: Wilber Lin MD;  [facility]    BRONCHOSCOPY, DILATE BRONCHUS, STENT BRONCHUS, COMBINED N/A 6/6/2019    Procedure: Rigid and Flexible Bronchoscopy, Balloon Dilation;  Surgeon: Wilber Lin MD;  [facility]    BRONCHOSCOPY, DILATE BRONCHUS, STENT BRONCHUS, COMBINED N/A 10/11/2019    Procedure: Flexible and Rigid Bronchoscopy, Balloon Dilation, Bronchoalveolar Lagave;  Surgeon: Delia  Wilber Warner MD;  [facility]    BRONCHOSCOPY, DILATE BRONCHUS, STENT BRONCHUS, COMBINED N/A 2/19/2021    Procedure: BRONCHOSCOPY, flexible, airway dilation, and bronchial wash;  Surgeon: Wilber Lin MD;  [facility]    BRONCHOSCOPY, DILATE BRONCHUS, STENT BRONCHUS, COMBINED N/A 4/9/2021    Procedure: BRONCHOSCOPY, flexible and rigid, Airway suctioning;  Surgeon: Mati Norris MD;  [facility]    BRONCHOSCOPY, DILATE BRONCHUS, STENT BRONCHUS, COMBINED N/A 10/17/2023    Procedure: RIGID, flexible bronchoscopy with airway dilation;  Surgeon: Preet Patel MD;  [facility]    CV RIGHT HEART CATH MEASUREMENTS RECORDED N/A 3/10/2020    Procedure: CV RIGHT HEART CATH;  Surgeon: Wai Garcia MD;  [facility]    ENT SURGERY      tonsillectomy as a child    ESOPHAGOSCOPY, GASTROSCOPY, DUODENOSCOPY (EGD), COMBINED N/A 10/29/2018    Procedure: COMBINED ESOPHAGOSCOPY, GASTROSCOPY, DUODENOSCOPY (EGD) with biopsies and polypectomy;  Surgeon: Chente Bloom MD;  [facility]    INSERT EXTRACORPORAL MEMBRANE OXYGENATOR ADULT (OUTSIDE OR) N/A 2/27/2018    Procedure: INSERT EXTRACORPORAL MEMBRANE OXYGENATOR ADULT (OUTSIDE OR);  INSERT EXTRACORPORAL MEMBRANE OXYGENATOR ADULT (OUTSIDE OR) ;  Surgeon: Toby Hernandez MD;  [facility]    IR CVC TUNNEL PLACEMENT > 5 YRS OF AGE  10/25/2019    IR DIALYSIS FISTULOGRAM LEFT  3/2/2021    IR DIALYSIS MECH THROMB, PTA  3/2/2021    IR FLUORO 0-1 HOUR  5/7/2021    IR GASTRO JEJUNOSTOMY TUBE PLACEMENT  2/16/2021    IR PARACENTESIS  1/8/2020    IR THORACENTESIS  9/13/2019    IR TRANSCATHETER BIOPSY  1/8/2020    LASER CO2 BRONCHOSCOPY N/A 4/30/2021    Procedure: Flexible and Rigid Bronchoscopy and Tissue Debulking with CO2 Laser Assistance;  Surgeon: Mati Norris MD;  [facility]    LASER CO2 BRONCHOSCOPY N/A 6/11/2021    Procedure: BRONCHOSCOPY, Flexible and Rigid Bronchoscopy, Tissue Debulking  with cryo Assistance, airway dilation,;  Surgeon: Mati Norris MD;  [facility]    LASER CO2 BRONCHOSCOPY N/A 2021    Procedure: BRONCHOSCOPY, flexible and rigid, CO2 Laser Debulking;  Surgeon: Mati Norris MD;  [facility]    LASER CO2 BRONCHOSCOPY N/A 2022    Procedure: flexible, rigid bronchoscopy, tissue debulking, airway dilation, co2 laser, bronchoalveolar lavage;  Surgeon: Juana Baugh MD;  [facility]    no prior surgery      REMOVE EXTRACORPORAL MEMBRANE OXYGENATOR ADULT N/A 3/3/2018    Procedure: REMOVE EXTRACORPORAL MEMBRANE OXYGENATOR ADULT;  Removal of Right Femoral Venous and Right Axillary Arterial Extracorporeal Membrane Oxygenator;  Surgeon: Toby Hernandez MD;  [facility]    TRANSPLANT LUNG RECIPIENT SINGLE X2 Bilateral 3/1/2018    Procedure: TRANSPLANT LUNG RECIPIENT SINGLE X2;  Median Sternotomy, Extracorporeal Membrane Oxygenator, bilateral sequential lung transplant;  Surgeon: Toby Hernandez MD;  Location:  OR     Social History     Socioeconomic History    Marital status:      Spouse name: Not on file    Number of children: Not on file    Years of education: Not on file    Highest education level: Not on file   Occupational History    Not on file   Tobacco Use    Smoking status: Never    Smokeless tobacco: Never   Substance and Sexual Activity    Alcohol use: No     Alcohol/week: 1.0 standard drink of alcohol     Types: 1 Glasses of wine per week    Drug use: No    Sexual activity: Not on file   Other Topics Concern    Parent/sibling w/ CABG, MI or angioplasty before 65F 55M? No   Social History Narrative    3/6/2019 - Lives with . Has three daughters. Four grandchildren (two ). No pets. Travelled previously to [facility]. Has visited [place] several times.      Social Determinants of Health     Financial Resource Strain: Not on file   Food Insecurity: Not on file   Transportation Needs: Not on file    Physical Activity: Not on file   Stress: Not on file   Social Connections: Not on file   Interpersonal Safety: Not on file   Housing Stability: Not on file     Family History   Problem Relation Age of Onset    Hypertension Mother     Arthritis Mother     Pancreatic Cancer Father     Diabetes Father      No current outpatient medications on file.       PE: Temp:  [97.4  F (36.3  C)-97.8  F (36.6  C)] 97.4  F (36.3  C)  Pulse:  [] 85  Resp:  [18-38] 24  BP: (103-140)/() 128/70  Cuff Mean (mmHg):  [] 103  SpO2:  [98 %-100 %] 100 %  Constitutional: no acute distress, resting in bed comfortably  Eyes: scaling around mouth and dry scaly lips   Resp: no respiratory distress on 2 LPM O2   Skin: dry, scaling  skin but significant improvement in diffuse erythematous rash.   Ext: LUE remains edematous, however is reportedly improved and is less erythematous. Has dry scaling of left arm. Radial pulse readily palpable. Skin is warm with good capillary refill.   Neuro: no gross focal deficits  Psych: nl judgement, affect    MEDS/LABS/IMAGING: reviewed, of note:    Lab Results   Component Value Date    WBC 5.0 10/30/2023    WBC 7.5 05/02/2021     Lab Results   Component Value Date    RBC 2.64 10/30/2023    RBC 3.47 05/02/2021     Lab Results   Component Value Date    HGB 8.7 10/30/2023    HGB 10.6 05/02/2021     Lab Results   Component Value Date    HCT 27.1 10/30/2023    HCT 33.8 05/02/2021     Lab Results   Component Value Date     10/30/2023    MCV 97 05/02/2021     Lab Results   Component Value Date    MCH 33.0 10/30/2023    MCH 30.5 05/02/2021     Lab Results   Component Value Date    MCHC 32.1 10/30/2023    MCHC 31.4 05/02/2021     Lab Results   Component Value Date    RDW 17.3 10/30/2023    RDW 13.9 05/02/2021     Lab Results   Component Value Date     10/30/2023     05/02/2021        Last Basic Metabolic Panel:  Lab Results   Component Value Date     10/30/2023      05/07/2021      Lab Results   Component Value Date    POTASSIUM 3.4 10/30/2023    POTASSIUM 3.8 10/17/2023    POTASSIUM 3.5 05/26/2022    POTASSIUM 4.3 05/07/2021     Lab Results   Component Value Date    CHLORIDE 101 10/30/2023    CHLORIDE 100 10/11/2023    CHLORIDE 102 05/07/2021     Lab Results   Component Value Date    CHELSEY 8.9 10/30/2023    CHELSEY 8.1 05/07/2021     Lab Results   Component Value Date    CO2 23 10/30/2023    CO2 32 05/26/2022    CO2 23 05/07/2021     Lab Results   Component Value Date    BUN 26.4 10/30/2023    BUN 42 05/26/2022    BUN 66 05/07/2021     Lab Results   Component Value Date    CR 3.44 10/30/2023    CR 3.90 05/07/2021     Lab Results   Component Value Date     10/30/2023    GLC 86 10/17/2023     05/26/2022     05/07/2021

## 2023-10-30 NOTE — PLAN OF CARE
[id number]: Patient denies pain. Satting 100% on 2L via NC overnight. Still with +3 edema in LUE, otherwise swelling and rash much improved. Slept well throughout the night and did not need to get up to the commode. Possible dialysis today but no orders yet. Right PIV infusing at TKO in-between antibiotic. Will continue to assist as needed and follow plan of care.      Goal Outcome Evaluation:           Overall Patient Progress: improvingOverall Patient Progress: improving    Outcome Evaluation: Swelling and rash much improved. LUE still with +3 edema. Denies pain.

## 2023-10-30 NOTE — PROGRESS NOTES
HEMODIALYSIS TREATMENT NOTE    Date: 10/30/2023  Time: 10:51 AM    Data:  Pre Wt: 66.8 kg (147 lb 4.3 oz)   Desired Wt: To be established   Post Wt: 63.8   Weight change:-3.3 kg  Ultrafiltration - Post Run Net Total Removed (mL): 3000mL  Vascular Access Status: Lt arm upper fistular  Dialyzer Rinse: Streaked  Total Blood Volume Processed:  68.73L Liters  Total Dialysis (Treatment) Time: 3.5 Hours    Lab:   No    Interventions: Assessment:  Checked V/S Q15 min (stable vital sign throughout the run) Pt alert and Oriented x4.  Pt ran for 3.5hrs on K 3.0 / Ca2.25 bath with a /  mL.  Net fluid removal 3.0 kg instead of 2.0kg. Lt arm is very swelling and lightly redness. Applied L.M.X cream for release pain.  Rinse back streaked. Pt sent back to floor with stable condition. Post dialysis hand off report  given to PCN.     Plan:    Next HD per renal

## 2023-10-30 NOTE — CONSULTS
"         Interventional Pulmonology          Initial Inpatient Consultation Note                                     October 30, 2023            Patient: Kecia Blue    Date of Admission: 10/25/2023  Reason for Consultation: RMB anastomotic stenosis  Requesting Physician: No referring provider defined for this encounter.      Assessment:   Kecia Blue is a 62 year old admitted on 10/25/2023 with worsening respiratory failure. Interventional Pulmonology is consulted today for bronchoscopy , airway examination.    S/p BSLT 2018, complicated by RMB stenosis, post transplant infection  S/p Bronchoscopy and balloon dilation of RMB stenosis on Oct 17 2023  CMV viremia  Pneumonia  Afib   Left arm swelling    Plan:  Continue 4 l/min NC oxygen  Antimicrobials per ID  We have discussed risks and benefits of doing repeat bronchoscopy.  The fact that patient feels better with balloon dilation in October 17, 2023 s/p short lasting patient may need a stent placement in the right main bronchus  We will plan to do flexible bronchoscopy, airway examination and another balloon dilation on November 1, 2023  If after repeat bronchoscopy patient is not going  to have long-lasting improvement of symptoms then may need airway stenting (may need custom made silicon stent)  Please keep patient n.p.o.  October 31 1023 midnight for bronchoscopy which is scheduled for November 1, 2023      Patient seen and discussed with Dr. Archana Lou  Interventional Pulmonary Fellow    Pager: [phone]            Chief Complaint: \"Worsening respiratory failure\"    History of Present Illness:   Kecia Blue is a 62 year old female with past medical history of bilateral sequential lung transplant done in March 2018 for interstitial lung disease, complicated transplant course including right main bronchus anastomotic stenosis, status post multiple bronchoscopies, stent placement and airway dilation last 5 years, last " bronchoscopy was October 17, 2023 with balloon dilation of right main bronchus anastomosis, ESRD on hemodialysis, chronic hypoxic respiratory failure, pneumonia admitted on October 25, 2023 with worsening respiratory symptoms.  Interventional pulmonology was consulted for evaluation for repeat bronchoscopy, airway examination on dilation.  During the evaluation patient was on 4 L/min nasal cannula oxygen, patient states that after the last bronchoscopy which was done October 17, 2023 she felt better for 1 day then her symptoms again got worse requiring oxygen.  Patient does not have fever or chills has a productive cough which is the same as before.  Patient currently on multiple antimicrobials including Zosyn, Bactrim.            Data:   All pertinent laboratory and imaging data reviewed.           Past Medical History:     Past Medical History:   Diagnosis Date    Acute on chronic respiratory failure with hypoxia (H) 02/21/2018    Anisocoria     Antisynthetase syndrome (H24) 2014    Anxiety     Aspergillus (H)     Aspergillus pneumonia (H) 11/20/2020    Benign essential hypertension     C. difficile colitis     Cytomegalovirus (CMV) viremia (H)     Dermatomyositis (H)     Dysplasia of cervix, low grade (ESTRADA 1)     EBV (Franklin-Barr virus) viremia     ESRD (end stage renal disease) on dialysis (H)     ILD (interstitial lung disease) (H)     Lung replaced by transplant (H)     Osteopenia     Paroxysmal atrial fibrillation (H)     Pneumocystis jiroveci pneumonia (H)     PONV (postoperative nausea and vomiting)     Post-menopause     Pulmonary hypertension (H)     Raynaud's disease     Seronegative rheumatoid arthritis (H)             Past Surgical History:     Past Surgical History:   Procedure Laterality Date    BRONCHOSCOPY (RIGID OR FLEXIBLE), DIAGNOSTIC N/A 4/10/2018    Procedure: COMBINED BRONCHOSCOPY (RIGID OR FLEXIBLE), LAVAGE;;  Surgeon: Mariposa Donohue MD;  Location:  GI    BRONCHOSCOPY (RIGID OR  FLEXIBLE), DIAGNOSTIC N/A 12/23/2020    Procedure: BRONCHOSCOPY, WITH BRONCHOALVEOLAR LAVAGE;  Surgeon: Uri Bass MD;  Location: [facility]    BRONCHOSCOPY (RIGID OR FLEXIBLE), DIAGNOSTIC N/A 5/26/2022    Procedure: BRONCHOSCOPY, WITH BRONCHOALVEOLAR LAVAGE;  Surgeon: Uri Bass MD;  Location: [facility]    BRONCHOSCOPY (RIGID OR FLEXIBLE), DIAGNOSTIC N/A 8/17/2023    Procedure: BRONCHOSCOPY, WITH BRONCHOALVEOLAR LAVAGE;  Surgeon: Esau Bruno MD;  Location: [facility]    BRONCHOSCOPY (RIGID OR FLEXIBLE), DILATE BRONCHUS / TRACHEA N/A 10/11/2018    Procedure: BRONCHOSCOPY (RIGID OR FLEXIBLE), DILATE BRONCHUS / TRACHEA;  Flexible And Rigid Bronchoscopy and Dilation;  Surgeon: Wilber Lin MD;  [facility]    BRONCHOSCOPY FLEXIBLE N/A 3/13/2018    Procedure: BRONCHOSCOPY FLEXIBLE;  Flexible Bronchoscopy ;  Surgeon: Gissell Sanchez MD;  [facility]: [facility]    BRONCHOSCOPY FLEXIBLE N/A 5/9/2018    Procedure: BRONCHOSCOPY FLEXIBLE;;  Surgeon: Wilber Lin MD;  [facility]: [facility]    BRONCHOSCOPY FLEXIBLE AND RIGID N/A 9/10/2018    Procedure: BRONCHOSCOPY FLEXIBLE AND RIGID;  Flexible and Rigid Bronchoscopy with Balloon Dilation, tissue debulking with cryo, and Right mainstem bronchus stent placement;  Surgeon: Wilber Lin MD;  [facility]    BRONCHOSCOPY RIGID N/A 6/6/2018    Procedure: BRONCHOSCOPY RIGID;;  Surgeon: Lopez Macias MD;  Location:  GI    BRONCHOSCOPY, DILATE BRONCHUS, STENT BRONCHUS, COMBINED N/A 6/11/2018    Procedure: COMBINED BRONCHOSCOPY, DILATE BRONCHUS, STENT BRONCHUS;  Flexible Bronchoscopy, Balloon Dilation, Bronchial Washings;  Surgeon: Wilber Lin MD;  [facility]    BRONCHOSCOPY, DILATE BRONCHUS, STENT BRONCHUS, COMBINED Right 7/10/2018    Procedure: COMBINED BRONCHOSCOPY, DILATE BRONCHUS, STENT BRONCHUS;  Flexible Bronchoscopy, Balloon Dilation, Bronchial Washings  ;  Surgeon: Wilber Lin MD;  [facility]     BRONCHOSCOPY, DILATE BRONCHUS, STENT BRONCHUS, COMBINED N/A 8/2/2018    Procedure: COMBINED BRONCHOSCOPY, DILATE BRONCHUS, STENT BRONCHUS;  Flexible Bronchoscopy, Bronchial Washings, Balloon Dilation;  Surgeon: Wilber Lin MD;  [facility]    BRONCHOSCOPY, DILATE BRONCHUS, STENT BRONCHUS, COMBINED N/A 8/20/2018    Procedure: COMBINED BRONCHOSCOPY, DILATE BRONCHUS, STENT BRONCHUS;  Flexible Bronchoscopy, Balloon Dilation;  Surgeon: Wilber Lin MD;  [facility]    BRONCHOSCOPY, DILATE BRONCHUS, STENT BRONCHUS, COMBINED N/A 10/29/2018    Procedure: Flexible Bronchoscopy, Balloon Dilation, Stent Revision, Airway Examination And Therapeutic Suctioning, Cyro Tumor Debulking;  Surgeon: Wilber Lin MD;  [facility]    BRONCHOSCOPY, DILATE BRONCHUS, STENT BRONCHUS, COMBINED N/A 11/20/2018    Procedure: Rigid Bronchoscopy, Stent Removal and dilitation;  Surgeon: Wilber Lin MD;  [facility]    BRONCHOSCOPY, DILATE BRONCHUS, STENT BRONCHUS, COMBINED N/A 12/14/2018    Procedure: Flexible And Rigid Bronchoscopy, Balloon Dilation, Bronchial Washing;  Surgeon: Wilber Lin MD;  [facility]    BRONCHOSCOPY, DILATE BRONCHUS, STENT BRONCHUS, COMBINED N/A 1/17/2019    Procedure: Flexible And Rigid Bronchoscopy, Balloon Dilation.;  Surgeon: Wilber Lin MD;  [facility]    BRONCHOSCOPY, DILATE BRONCHUS, STENT BRONCHUS, COMBINED N/A 3/7/2019    Procedure: Flexible and Rigid Bronchoscopy, Bronchial Washing, Balloon Dilation;  Surgeon: Wilber Lin MD;  [facility]    BRONCHOSCOPY, DILATE BRONCHUS, STENT BRONCHUS, COMBINED N/A 6/6/2019    Procedure: Rigid and Flexible Bronchoscopy, Balloon Dilation;  Surgeon: Wilber Lin MD;  [facility]    BRONCHOSCOPY, DILATE BRONCHUS, STENT BRONCHUS, COMBINED N/A 10/11/2019    Procedure: Flexible and Rigid Bronchoscopy, Balloon Dilation, Bronchoalveolar Lagave;  Surgeon: Delia  Wilber Warner MD;  [facility]    BRONCHOSCOPY, DILATE BRONCHUS, STENT BRONCHUS, COMBINED N/A 2/19/2021    Procedure: BRONCHOSCOPY, flexible, airway dilation, and bronchial wash;  Surgeon: Wilber Lin MD;  [facility]    BRONCHOSCOPY, DILATE BRONCHUS, STENT BRONCHUS, COMBINED N/A 4/9/2021    Procedure: BRONCHOSCOPY, flexible and rigid, Airway suctioning;  Surgeon: Mati Norris MD;  [facility]    BRONCHOSCOPY, DILATE BRONCHUS, STENT BRONCHUS, COMBINED N/A 10/17/2023    Procedure: RIGID, flexible bronchoscopy with airway dilation;  Surgeon: Preet Patel MD;  [facility]    CV RIGHT HEART CATH MEASUREMENTS RECORDED N/A 3/10/2020    Procedure: CV RIGHT HEART CATH;  Surgeon: Wai Garcia MD;  [facility]    ENT SURGERY      tonsillectomy as a child    ESOPHAGOSCOPY, GASTROSCOPY, DUODENOSCOPY (EGD), COMBINED N/A 10/29/2018    Procedure: COMBINED ESOPHAGOSCOPY, GASTROSCOPY, DUODENOSCOPY (EGD) with biopsies and polypectomy;  Surgeon: Chente Bloom MD;  [facility]    INSERT EXTRACORPORAL MEMBRANE OXYGENATOR ADULT (OUTSIDE OR) N/A 2/27/2018    Procedure: INSERT EXTRACORPORAL MEMBRANE OXYGENATOR ADULT (OUTSIDE OR);  INSERT EXTRACORPORAL MEMBRANE OXYGENATOR ADULT (OUTSIDE OR) ;  Surgeon: Toby Hernandez MD;  [facility]    IR CVC TUNNEL PLACEMENT > 5 YRS OF AGE  10/25/2019    IR DIALYSIS FISTULOGRAM LEFT  3/2/2021    IR DIALYSIS MECH THROMB, PTA  3/2/2021    IR FLUORO 0-1 HOUR  5/7/2021    IR GASTRO JEJUNOSTOMY TUBE PLACEMENT  2/16/2021    IR PARACENTESIS  1/8/2020    IR THORACENTESIS  9/13/2019    IR TRANSCATHETER BIOPSY  1/8/2020    LASER CO2 BRONCHOSCOPY N/A 4/30/2021    Procedure: Flexible and Rigid Bronchoscopy and Tissue Debulking with CO2 Laser Assistance;  Surgeon: Mati Norris MD;  [facility]    LASER CO2 BRONCHOSCOPY N/A 6/11/2021    Procedure: BRONCHOSCOPY, Flexible and Rigid Bronchoscopy, Tissue Debulking  with cryo Assistance, airway dilation,;  Surgeon: Mati Norris MD;  [facility]    LASER CO2 BRONCHOSCOPY N/A 2021    Procedure: BRONCHOSCOPY, flexible and rigid, CO2 Laser Debulking;  Surgeon: Mati Norris MD;  [facility]    LASER CO2 BRONCHOSCOPY N/A 2022    Procedure: flexible, rigid bronchoscopy, tissue debulking, airway dilation, co2 laser, bronchoalveolar lavage;  Surgeon: Juana Baugh MD;  [facility]    no prior surgery      REMOVE EXTRACORPORAL MEMBRANE OXYGENATOR ADULT N/A 3/3/2018    Procedure: REMOVE EXTRACORPORAL MEMBRANE OXYGENATOR ADULT;  Removal of Right Femoral Venous and Right Axillary Arterial Extracorporeal Membrane Oxygenator;  Surgeon: Toby eHrnandez MD;  [facility]    TRANSPLANT LUNG RECIPIENT SINGLE X2 Bilateral 3/1/2018    Procedure: TRANSPLANT LUNG RECIPIENT SINGLE X2;  Median Sternotomy, Extracorporeal Membrane Oxygenator, bilateral sequential lung transplant;  Surgeon: Toby Hernandez MD;  [facility]            Social History:     Social History     Socioeconomic History    Marital status:      Spouse name: Not on file    Number of children: Not on file    Years of education: Not on file    Highest education level: Not on file   Occupational History    Not on file   Tobacco Use    Smoking status: Never    Smokeless tobacco: Never   Substance and Sexual Activity    Alcohol use: No     Alcohol/week: 1.0 standard drink of alcohol     Types: 1 Glasses of wine per week    Drug use: No    Sexual activity: Not on file   Other Topics Concern    Parent/sibling w/ CABG, MI or angioplasty before 65F 55M? No   Social History Narrative    3/6/2019 - Lives with . Has three daughters. Four grandchildren (two ). No pets. Travelled previously to [facility]. Has visited [place] several times.      Social Determinants of Health     Financial Resource Strain: Not on file   Food Insecurity: Not on file    Transportation Needs: Not on file   Physical Activity: Not on file   Stress: Not on file   Social Connections: Not on file   Interpersonal Safety: Not on file   Housing Stability: Not on file            Family History:     Family History   Problem Relation Age of Onset    Hypertension Mother     Arthritis Mother     Pancreatic Cancer Father     Diabetes Father             Allergies:     Allergies   Allergen Reactions    Vancomycin Other (See Comments)     Diffuse erythroderma with itching (improved with Benadryl) after receiving vancomycin over 1 hour. 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 Can use vancomycin in the future, but please give over slower infusion time (at least 2 hours) and premedicate with Benadryl.            Medications:      acetylcysteine  2 mL Nebulization TID    azaTHIOprine  50 mg Oral QPM    [Held by provider] azithromycin  250 mg Oral Daily    [Held by provider] calcium acetate  667 mg Oral TID w/meals    fluticasone-vilanterol  1 puff Inhalation Daily    heparin ANTICOAGULANT  5,000 Units Subcutaneous Q12H    hydrocortisone   Topical BID    levalbuterol  1.25 mg Nebulization 3 times daily    [Held by provider] metoprolol tartrate  25 mg Oral or Feeding Tube BID    midodrine  10 mg Oral TID w/meals    montelukast  10 mg Oral At Bedtime    multivitamin RENAL  1 tablet Oral Daily    pantoprazole  40 mg Oral QAM AC    piperacillin-tazobactam  2.25 g Intravenous Q6H    posaconazole  300 mg Oral Daily    [START ON 10/31/2023] predniSONE  15 mg Oral QAM    Followed by    [START ON 11/3/2023] predniSONE  10 mg Oral QAM    Followed by    [START ON 11/6/2023] predniSONE  5 mg Oral QAM    sulfamethoxazole-trimethoprim  1 tablet Oral Once per day on Monday Wednesday Friday    tacrolimus  0.5 mg Oral QAM    tacrolimus  1 mg Oral QPM    triamcinolone   Topical BID    valGANciclovir  450 mg Oral Daily    Vitamin D3  50 mcg Oral  Daily         Review of Systems:  Gen: negative for fever, chills, change in weight  ENT: no sore throat, new sinus pain or nasal drainage  Resp: see interval history  CV: no chest pain, no palpitations  GI: no nausea, vomiting, change in stools  : no dysuria  MSK: no myalgias, arthralgias  Lymph no new lymphadenopathy  Neuro: no numbness, weakness, headaches  Heme: no bleeding or easy bruisability  Skin: no rash or obvious new lesions  Psych: mood stable         Physical Exam:   Temp:  [97.5  F (36.4  C)-98.3  F (36.8  C)] 97.7  F (36.5  C)  Pulse:  [] 125  Resp:  [18-38] 38  BP: (103-140)/() 132/104  Cuff Mean (mmHg):  [] 103  SpO2:  [98 %-100 %] 98 %  General: Awake, alert and in no apparent distress   Neck: Trachea supple/midline  Pulm: rhonchi b/l, no wheezes or crackles  CV: RRR, no murmurs  Abdomen: Soft, non-tender, non-distended   MSK: left arm swelling  Neurologic: No focal deficits  Skin: No obvious rash. Warm and dry  Psych: AAOx3, not agitated, answering to questions appropriately

## 2023-10-31 NOTE — PROGRESS NOTES
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  The patient was admitted on 10/25/2023 with malaise, cough, and dyspnea along with 2 weeks of new hypoxia, CT chest concerning for uncontrolled infection.  Also with LUE swelling and erythema, worsened 10/27.     Today's recommendations:    - IP bronchoscopy tomorrow for possible dilatation +/- stent placement  - ABX per transplant ID, low threshold to broaden with clinical decline  - Wean supplemental oxygen to maintain SpO2>92%  - DSA (10/27) pending  - Tacrolimus level 8.1,   -  Repeat ImmuKnow in 4 weeks (, not yet ordered)  - Plan to resume azithromycin and repeat EKG for QTc monitoring in 2-3 days  - Continue posaconazole, repeat level (10/27) pending  - VGCV per transplant ID, repeat CMV level ordered 10/31  - EBV (10/28) pending  - Discussed with primary team regarding further workup for left upper arm swelling- imaging +/_ vascular consult    Yonny Orona MD FCCP  Associate Professor of Medicine  Division of Pulmonary, Allergy & Critical Care   [facility]        Right main bronchial stenosis s/p recurrent dilatation: Follows with IP for serial bronchs with dilation.  Last bronch (10/17) with dilation.  Patient reports marked improvement for approx 24h after the dilation but then rapid return of symptoms.  Then with coarse  inspiratory/expiratory wheeze on exam, concerning for recurrent stenosis.   - IP consult for consideration of bronch this week to evaluate for recurrent stenosis  - Airway clearance as below (PTA albuterol nebs q6h prn, Mucomyst nebs BID prn, and Pulmicort neb daily prn)  - Per transplant ID: recommend post-dilation course of Augmentin for ~2 weeks in the future (or alternatively, 1 week out of each month)    Acute hypoxic respiratory failure:  Concern for pneumonia:  Hypotension:  Arrhythmia: Progressive malaise, productive cough of clear sputum, and dyspnea for 2 months with occasional wheezing.  S/p bronch/BAL (8/17) with Steno s/p Levaquin and Bactrim without improvement.  CT chest (10/11, [facility] overread) with increased GGO and nodules t/o RML and KONRAD.  New hypoxia at home (2L NC x 2 weeks).  S/p OR bronch/BAL (10/17) with RM stenosis dilation (as below).  Felt well for 24 hours then symptoms returned.  Bronch culture (10/17) with Actinomyces sp. and started on Augmentin the day PTA.  CT chest (10/24) with worsening consolidation bilaterally concerning for worsening infection, new prominent left upper lobe anterior pleural-based soft tissue thickening, mild air trapping.  Low grade temp (100 degree F on 10/25), leukocytosis (3.9 --> 11.6), LA 1.1, and procal 0.7.  MRSA nares (10/25), Cryptococcal Ag (10/26), and respiratory panel (10/27) all negative.  DDx includes pneumonia/infection, PE (tachycardia and hypotension, extremity US negative DVT, echo with normal appearing RV although pulmonary HTN noted), worsening CLAD, rejection (DSA pending as below, cannot rule out ACR), cardiac, hypervolemia (missed dialysis 10/25, CXR 10/25 with pulmonary edema).  Significant LUE swelling 10/27, see below.  CT PE 10/27 with no PE, noted decreased edema in the lung with continued small pleural effusions, cardiomegaly, and left breast skin thickening and breast edema.  No significant improvement in symptoms with current ABX.     - Blood cultures (10/25) NGTD  - Bacterial, fungal, AFB, and Nocardia sputum cultures ordered pending collection  - Histo urine Ag, UA/UC ordered pending collection  - Encourage IS and Aerobika  - Nebs: Xopenex and Mucomyst TID (10/26)   - ABX: IV Zosyn (10/25) per transplant ID and anticipate 2 week course and probably deescalate to Augmentin once she improves for discharge; s/p minocycline (10/25-10/26) and vancomycin (10/25) discontinued per transplant ID, low threshold to resume or revisit ABX regimen with clinical decline, if vancomycin resumed premedicate with antihistamine and slow infusion time given vancomycin-infusion erythrodermic pruritic reaction on 10/25  - Supplemental O2 to maintain SpO2>92%, wean as able  - CXR ordered today  - IP consult for consideration of repeat bronch (+cultures) as above     S/p bilateral sequential lung transplant (BSLT) for ILD: Pulmonary clinic visit with Ame Chow 10/24 with increased cough and dyspena, occasional wheezing, as above.  PFTs decreased.  DSA negative 8/8.  IgG adequate 979 on 10/27.   - DSA (10/27) pending     Immunosuppression: Recent ImmuKnow 479 on 9/7, suggesting increased risk of rejection, repeat 412 on 10/27.  - Tacrolimus 0.5 mg qAM / 1 mg qPM (Pt. missed 10/25 AM dose).  Goal level 8-10.  Level 10/30 supratherapeutic at 13.2, hold dose this evening and resume at decreased dose of 0.5 mg BID starting 10/31, daily level ordered through 11/2 for close monitoring.  - AZA 50 mg daily (decreased 10/26 from 75 mg daily given concern for infection)  - Prednisone held with burst/taper as below (chronic dose 5 mg daily)  - Repeat ImmuKnow in 4 weeks (11/24, not yet ordered)     Prophylaxis:   - Bactrim MWF for PJP ppx     CLAD: PTA azithromycin held 10/27 given prolonged QTc --> repeat QTc 10/30 improved to 455, plan to resume and repeat EKG for QTc monitoring in 2-3 days, Singulair, and Breo (hospital substitute for [facility])     Left-sided Aspergillus  empyema: Noted in 2019 s/p needle aspiration with Aspergillus fumigatus on cultures s/p intrapleural amphotericin bead placement.  Posaconazole indefinitely per transplant ID, most recent level 1 on 2/9.  Fungitell (10/27) and A. galactomannan (10/26) negative.  - PTA PO posaconazole, repeat level (10/27) pending     Recurrent CMV viremia: Positive at 2,450 on 10/4 and peaked at 2,680 on 10/11, down to  on 10/24.  - CMV weekly, ordered 10/31  - PTA VGCV induction dosing (450 mg daily increased 10/27 per transplant ID, prior 450 mg MWF after HD) increased dose for 3 weeks or until viral load negative x2 checks, then back to 3 times weekly after HD dosing after that     EBV viremia: EBV <35 on 10/11, from 42k with log 4.6 on 8/8.    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  Repeat LUE US (10/27) without DVT although noted left cervical lymphadenopathy and subcutaneous soft tissue thickening in the distal upper extremity.   CT AP (10/28, given left lymphadenopathy/extremity swelling) with basilar airspace consolidation and superimposed pulmonary edema, asymmetric anasarca (L>R) and trace ascites, mildly prominent retroperitoneal lymphadenopathy, and cholelithiasis without acute cholecystitis.  Rheumatology consulted 10/28, see their note for details.    - Prednisone burst/taper (10/28) per primary team, chronic dose ordered to resume 11/6  - Further imaging per primary team, consider vascular surgery consult     ESRD on iHD (since 10/2019): On MWF iHD.  Renal transplant evaluation on hold currently for infection and worsening PFTs of unknown etiology.  Missed dialysis run on 10/25.  Management per nephrology.    We appreciate the excellent care  provided by the [facility] team.  Recommendations communicated via telephone and this note.  Will continue to follow along closely, please do not hesitate to call with any questions or concerns.    Patient discussed with Dr. Orona.    Cindy Mcadams PA-C  [facility]patient [facility]  Pulmonary CF/Transplant     Subjective & Interval History:     On 2L NC, denies dyspnea.  Cough nonproductive, denies chest congestion or pain.  Having LUE pain since starting dialysis this morning, swelling and redness ongoing, started to have some peeling this morning.  Improving loose stools.      Review of Systems:     C: No fever, no chills  INTEGUMENTARY/SKIN: See interval history  ENT/MOUTH: No sore throat, no sinus pain, no nasal congestion or drainage  RESP: See interval history  CV: + peripheral edema  GI: No nausea, no vomiting, no reflux symptoms  : + oliguric, no dysuria   MUSCULOSKELETAL: See interval history  ENDOCRINE: Blood sugars with adequate control  NEURO: No headache, no numbness or tingling  PSYCHIATRIC: Mood stable    Physical Exam:     All notes, images, and labs from past 24 hours (at minimum) were reviewed.    Vital signs:  Temp: 98.4  F (36.9  C) Temp src: Oral BP: 119/73 Pulse: 100   Resp: 16 SpO2: 100 % O2 Device: Nasal cannula Oxygen Delivery: 2 LPM   Weight: 63.9 kg (140 lb 14.4 oz)  I/O:   Intake/Output Summary (Last 24 hours) at 10/30/2023 1153  Last data filed at 10/30/2023 1145  Gross per 24 hour   Intake 0 ml   Output 3 ml   Net -3 ml     Constitutional: Lying in bed at dialysis, in no apparent distress.   HEENT: Eyes with pink conjunctivae, anicteric.  Oral mucosa moist without lesions.   PULM: Diminished air flow bilaterally, >bases.  Minimal crackles, occasional wheeze.  No rhonchi.  Non-labored breathing on 2L NC.  CV: Tachycardic.  + LUE edema and BLE edema.   ABD: NABS, soft, nontender, nondistended.    MSK: Moves all extremities.    NEURO: Alert and conversant.   SKIN: Warm, dry.  + LUE erythema  "with some areas of desquamation.   PSYCH: Mood stable.     Data:     LABS    CMP:   Recent Labs   Lab 10/31/23  0606 10/30/23  1706 10/30/23  0652 10/29/23  0642 10/28/23  2129 10/28/23  1641 10/28/23  0725 10/27/23  0701 10/26/23  2132    136 137 135  --   --  127*   < >  --    POTASSIUM 3.6 3.4 3.4 3.4  --   --  3.2*   < >  --    CHLORIDE 101 99 101 99  --   --  92*   < >  --    CO2 26 25 23 23  --   --  21*   < >  --    ANIONGAP 10 12 13 13  --   --  14   < >  --    * 134* 116* 136*  --   --  95   < >  --    BUN 21.2 11.0 26.4* 15.7  --   --  27.7*   < >  --    CR 2.60* 1.94* 3.44* 2.51*  --   --  4.02*   < >  --    GFRESTIMATED 20* 29* 15* 21*  --   --  12*   < >  --    CHELSEY 8.2* 7.9* 8.9 8.5*  --   --  8.7*   < >  --    MAG  --   --   --   --   --   --   --   --  1.5*   PHOS 3.1  --  4.0 3.6 2.2*   < > 1.9*   < >  --    PROTTOTAL 5.3*  --  6.0* 5.8*  --   --  5.5*   < >  --    ALBUMIN 2.9*  --  3.1* 3.0*  --   --  2.7*   < >  --    BILITOTAL 0.6  --  0.7 0.8  --   --  0.9   < >  --    ALKPHOS 124*  --  114* 114*  --   --  107*   < >  --    AST 12  --  12 12  --   --  12   < >  --    ALT 19  --  20 15  --   --  16   < >  --     < > = values in this interval not displayed.     CBC:   Recent Labs   Lab 10/31/23  0606 10/30/23  1706 10/30/23  0652 10/29/23  0642   WBC 2.1*  2.1* 3.1* 5.0 5.2   RBC 2.31* 2.42* 2.64* 2.51*   HGB 7.6* 7.9* 8.7* 8.2*   HCT 23.8* 24.6* 27.1* 26.0*   * 102* 103* 104*   MCH 32.9 32.6 33.0 32.7   MCHC 31.9 32.1 32.1 31.5   RDW 17.2* 17.2* 17.3* 17.4*   * 152 192 133*       INR:   No lab results found in last 7 days.      Glucose:   Recent Labs   Lab 10/31/23  0606 10/30/23  1706 10/30/23  0652 10/29/23  0642 10/28/23  0725 10/27/23  0701   * 134* 116* 136* 95 100*       Blood Gas: No lab results found in last 7 days.    Culture Data No results for input(s): \"CULT\" in the last 168 hours.    Virology Data:   Lab Results   Component Value Date    FLUAH1 Not " Detected 10/27/2023    FLUAH3 Not Detected 10/27/2023    XX9574 Not Detected 10/27/2023    IFLUB Not Detected 10/27/2023    RSVA Not Detected 10/27/2023    RSVB Not Detected 10/27/2023    PIV1 Not Detected 10/27/2023    PIV2 Not Detected 10/27/2023    PIV3 Not Detected 10/27/2023    HMPV Not Detected 10/27/2023    HRVS Negative 01/24/2021    ADVBE Negative 01/24/2021    ADVC Negative 01/24/2021    ADVC Negative 12/23/2020    ADVC Negative 10/07/2019       Historical CMV results (last 3 of prior testing):  Lab Results   Component Value Date    CMVQNT Not Detected 07/11/2023    CMVQNT Not Detected 04/06/2023    CMVQNT <137 (A) 02/09/2023     Lab Results   Component Value Date    CMVLOG 2.0 10/24/2023    CMVLOG 1.9 10/24/2023    CMVLOG 1.9 10/17/2023       Urine Studies    Recent Labs   Lab Test 01/24/21  1729 10/21/19  2240   URINEPH 5.0 5.0   NITRITE Negative Negative   LEUKEST Moderate* Large*   WBCU 34* 115*       Most Recent Breeze Pulmonary Function Testing (FVC/FEV1 only)  FVC-Pre   Date Value Ref Range Status   10/24/2023 0.96 L    09/07/2023 1.25 L    08/08/2023 1.35 L    07/11/2023 1.47 L      FVC-%Pred-Pre   Date Value Ref Range Status   10/24/2023 33 %    09/07/2023 42 %    08/08/2023 46 %    07/11/2023 50 %      FEV1-Pre   Date Value Ref Range Status   10/24/2023 0.87 L    09/07/2023 1.07 L    08/08/2023 1.12 L    07/11/2023 1.16 L      FEV1-%Pred-Pre   Date Value Ref Range Status   10/24/2023 37 %    09/07/2023 46 %    08/08/2023 48 %    07/11/2023 49 %        IMAGING    Recent Results (from the past 48 hour(s))   CT Abdomen Pelvis w/o Contrast    Narrative    EXAMINATION: CT ABDOMEN PELVIS W/O CONTRAST  10/28/2023 2:12 PM      CLINICAL HISTORY: new L lymphadenopathy, L sided extremity swelling,  look for additional LAD    COMPARISON: 2/9/2021, 10/27/2023    PROCEDURE COMMENTS: CT of the abdomen was performed without   intravenous or oral contrast. Coronal and sagittal reformatted images  were  obtained.    FINDINGS:  Lower thorax:   Status post lung transplant. There is a airspace consolidation in the  lower lobes, right middle lobe and lingula with small pleural  effusions and compressive atelectasis. Calcification in the medial  left lower lobe pleural space is unchanged. Calcified right hilar  lymph node. Cardiomegaly and trace pericardial effusion. Persistent  abnormal skin thickening of the left breast is partially visualized.  Partially imaged sternotomy wires.    Abdomen and pelvis:  Hepatomegaly without cirrhotic morphology. Cholelithiasis without  acute cholecystitis, however the gallbladder is mildly hydropic.  Normal caliber biliary system. Normal pancreas. Borderline prominent  spleen. Normal adrenal glands. Atrophic kidneys with a cyst noted  within the lower pole right kidney and bilateral hilar vascular  calcifications.     Decompressed urinary bladder with intravesicular contrast.     Rectosigmoid distention with liquid stool. Fluid-filled small bowel  loops. Normal appendix.    Diffuse mesenteric edema and trace ascites. Normal-appearing uterus  and adnexa. Multiple prominent retroperitoneal lymph nodes are noted.  For example, a conglomeration of left periaortic nodes with  significant surrounding inflammatory fat stranding measuring up to 11  mm in short axis (series 2 images 38-51). No intra-abdominal free air.  Mild aortobiiliac atherosclerotic without aneurysm.    Osseous structures:   No acute abnormalities.      Impression    IMPRESSION:  1. Basilar airspace consolidation in the visualized lungs. There is  superimposed pulmonary edema.  2. Asymmetric anasarca, left greater than right, and trace ascites.  3. Mildly prominent retroperitoneal lymphadenopathy may be reactive,  however post transplant lymphoproliferative disease cannot be  excluded.  4. Cholelithiasis without acute cholecystitis.    I have personally reviewed the examination and initial interpretation  and I agree with  the findings.    REJI VIVAS DO         SYSTEM ID:  [id number]   US Upper Extremity Non Vascular Left    Narrative    EXAMINATION: Doppler evaluation of the left axilla.    COMPARISON: None.    HISTORY: Assess drainage, has unilateral left-sided swelling and axial    TECHNIQUE:  Gray-scale evaluation with color Doppler of the left  axilla.    FINDINGS: Within the area of interest at the left axilla there is mild  soft tissue edema. No drainable fluid collection.       Impression    IMPRESSION: No drainable fluid collection within the area of interest  in the left axilla. Mild soft tissue edema.    I have personally reviewed the examination and initial interpretation  and I agree with the findings.    REJI VIVAS DO         SYSTEM ID:  [id number]   US Lower Extremity Venous Duplex Left    Narrative    EXAMINATION: DOPPLER VENOUS ULTRASOUND OF THE LEFT LOWER EXTREMITY,  10/28/2023 4:40 PM     COMPARISON: Lower extremity venous ultrasound 7/26/2023    HISTORY: Assess for DVT, unilateral left leg swelling    TECHNIQUE:  Gray-scale evaluation with compression, spectral flow, and  color Doppler assessment of the deep venous system of the proximal  right leg and left leg from groin to knee, and then at the ankle.    FINDINGS:  In the left lower extremity, the common femoral, femoral, popliteal  and posterior tibial veins demonstrate normal compressibility and  blood flow.    Within the left groin there is a 1.5 x 0.4 x 1.0 lymph node, within  normal limits.    In the proximal right lower extremity, the common femoral vein  demonstrates normal compressibility and blood flow.      Impression    IMPRESSION:  1.  No evidence left lower extremity deep venous thrombosis.    I have personally reviewed the examination and initial interpretation  and I agree with the findings.    REJI VIVAS DO         SYSTEM ID:  [id number]

## 2023-10-31 NOTE — PROGRESS NOTES
HEMODIALYSIS TREATMENT NOTE    Date: 10/31/2023  Time: 5:49 PM    Data:  Pre Wt: 63.5 kg (139 lb 15.9 oz)   Desired Wt:   kg   Post Wt: 61.5 kg (135 lb 9.3 oz)  Weight change: 2 kg  Ultrafiltration - Post Run Net Total Removed (mL): 2 mL  Vascular Access Status: patent  Dialyzer Rinse: Clear  Total Blood Volume Processed: 46.43 L Liters  Total Dialysis (Treatment) Time: 2 Hours    Lab:   No    Interventions: TX ended as ordered. Vital sings stable    Assessment:  Vital sings stable     Plan:    Per renal team

## 2023-10-31 NOTE — PROGRESS NOTES
[facility]  Transplant Infectious Disease Progress Note     Patient:  Kecia Blue, Date of birth 1960, Medical record number [id number]  Date of Visit:  10/31/2023         Assessment and Recommendations:   Recommendations:  1. Continue pip-tazo through planned dilation tomorrow 11/1, then can switch to po amox-clav through 11/3 for 10 day course for post-obstruction PNA  2. Continue posaconazole, TMP-SMX, and azithromycin for prophylaxis.   3. Continue valganciclovir to 450 mg po daily for now, may need to adjust dose soon in the setting of worsening leukopenia. Be sure to give after HD on HD days. Following CMV VL from 10/31.  4. Draw daily CBC w diff while leukopenic to trend ANC    Discussed with Dr. Lewis, ID staff. Transplant Infectious Disease will continue to follow with you.    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 Would not consider this pulmonary actinomyces at this time, but rather use that as a surrogate of significant oral contamination that has caused her slowly progressive symptoms.  "Zosyn is an excellent drug for this syndrome and she already has had signficant improvement since starting Zosyn on 10/25 and rest of work-up has been unrevealing. She is planned for repeat bronchoscopy in tomorrow for dilation to see if that helps her symptoms; possibly may have stents placed in the future.    #Erythrodermic skin rash, diffuse, pruritus, after receiving vancomycin + pip-tazo  Responded well to diphenhydramine, so it seemed consistent with vanco-infusion syndrome, but given her history of autoimmune disease (dermatomyositis), and the fact the rash persisted for quite awhile, dermatology is involved who feel it may resemble recurrence of autoimmune disease and are considering skin biopsy. Updated allergy list. Her rash has continued to improve while still on pip-tazo.    #CMV viremia  #Leukopenia  Low-grade viremia since 8/2023. She had a level up to log 521 (log 2.7) on 9/7/2023, though was not started on VGCV (three times weekly after HD) until approx 10/6/2023. She remains low-level viremic on 10/17 and 10/24 (log 1.9). She was undetectable throughout rest of 2022 and 2023. She reports taking VGCV in the past for \"cold sores\" for short courses, though I wonder if she means valacyclovir. Either way, she may be at risk for resistance. Increased dose to daily and she is now progressively leukopenic; ANC 1.4 on 10/31, so may need to decrease dose back to three times per week or even stop, since the viremia is low-grade.    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 BAL showed septate hyphae. 7/2020 had hypodense mass in left lung with biopsy showing fungal hyphae, cx aspergillus. Started on voriconazole in 2019, changed to posaconazole in 2020, she remains on 300 mg daily.  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 Recovery from this " required tracheostomy.  2019 - She had also grown Actinomyces from multiple BAL    - QTc interval: 420 msec (10/25/2023)   - Bacterial prophylaxis: Azithromycin  - Pneumocystis prophylaxis: TMP-SMX for life (hx PJP)  - Serostatus & viral prophylaxis: CMV D+/R+, EBV D+/R+  - Fungal prophylaxis: posaconazole   - Immunization status: Seasonal influenza, RSV, and COVID vaccine  - Gamma globulin status: 979 10/27/2023  - Isolation status: Hx MRSA - contact         Summary of presentation:   59F with history of ILD, seronative RA, dermatomyositis s/p bilateral lung transplant 3/2018 with post transplant course c/b left aspergilus empyema (2019, on posaconazole), CMV viremia, ESRD on HD, and 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 Cough is improving. Minimal dyspnea. No fevers, chills. Dermatology adjusted her skin cream regimen.     Transplants:  3/1/2018 (Lung), Postoperative day:  2070.  Coordinator Kacy Martínez           Current Medications & Allergies:       acetylcysteine  2 mL Nebulization TID     azaTHIOprine  50 mg Oral QPM     azithromycin  250 mg Oral Daily     [Held by provider] calcium acetate  667 mg Oral TID w/meals     fluticasone-vilanterol  1 puff Inhalation Daily     heparin ANTICOAGULANT  5,000 Units Subcutaneous Q12H     hydrocortisone   Topical BID     HYDROmorphone  0.2 mg Intravenous Once     levalbuterol  1.25 mg Nebulization 3 times daily     [Held by provider] metoprolol tartrate  25 mg Oral or Feeding Tube BID     midodrine  10 mg Oral TID  w/meals     montelukast  10 mg Oral At Bedtime     multivitamin RENAL  1 tablet Oral Daily     - MEDICATION INSTRUCTIONS -   Does not apply Once     pantoprazole  40 mg Oral QAM AC     piperacillin-tazobactam  2.25 g Intravenous Q6H     posaconazole  300 mg Oral Daily     predniSONE  15 mg Oral QAM    Followed by     [START ON 11/3/2023] predniSONE  10 mg Oral QAM    Followed by     [START ON 11/6/2023] predniSONE  5 mg Oral QAM     sodium chloride 0.9%  250 mL Intravenous Once in dialysis/CRRT     sodium chloride 0.9%  300 mL Hemodialysis Machine Once     sulfamethoxazole-trimethoprim  1 tablet Oral Once per day on Monday Wednesday Friday     tacrolimus  0.5 mg Oral BID IS     triamcinolone   Topical BID     valGANciclovir  450 mg Oral Daily     Vitamin D3  50 mcg Oral Daily            Physical Exam:     Patient Vitals for the past 24 hrs:   BP Temp Temp src Pulse Resp SpO2 Weight   10/31/23 1119 -- -- -- -- -- -- 63.9 kg (140 lb 14.4 oz)   10/31/23 0900 119/73 -- -- 100 -- 100 % --   10/31/23 0645 132/71 98.4  F (36.9  C) Oral 84 16 100 % --   10/30/23 2134 127/67 98.6  F (37  C) Oral 82 18 100 % --   10/30/23 1422 128/70 97.4  F (36.3  C) Oral 85 24 100 % --     Ranges for vital signs:  Temp:  [97.4  F (36.3  C)-98.6  F (37  C)] 98.4  F (36.9  C)  Pulse:  [] 100  Resp:  [16-24] 16  BP: (119-132)/(67-73) 119/73  FiO2 (%):  [2 %] 2 %  SpO2:  [100 %] 100 %  Vitals:    10/29/23 1555 10/30/23 0800 10/31/23 1119   Weight: 66.5 kg (146 lb 8 oz) 66.8 kg (147 lb 4.3 oz) 63.9 kg (140 lb 14.4 oz)       Physical Examination:  GENERAL:  in bed in no acute distress; O2 in place.  HEAD:  Head is normocephalic, atraumatic   EYES:  Eyes have anicteric sclerae without conjunctival injection   ENT:  No oral ulcers. Peeling skin up to the vermillion border but does not involve lips or oral cavity.  NECK:  Supple. No cervical lymphadenopathy  LUNGS:  Scattered rhonchi throughout.   CARDIOVASCULAR:  Regular rate and rhythm with  "no murmurs, gallops or rubs.  ABDOMEN:  Normal bowel sounds, soft, nontender. No appreciable hepatosplenomegaly.  SKIN:  Diffuse warm erythematous rash that is now exfoliating.  Line in place without any surrounding erythema or exudate. Bandage over L AVF.  NEUROLOGIC:  Grossly nonfocal. Active x4 extremities         Laboratory Data:     No results found for: \"ACD4\", \"HIVPCR\"    Inflammatory Markers    Recent Labs   Lab Test 10/29/23  0642 10/28/23  1737 10/28/23  0725 10/27/23  0701 10/27/19  1147 10/07/19  1221 10/06/19  1444 09/13/19  0934 08/22/19  0820 05/16/18  1006   SED 66*  --  58*  --   --  93*  --  111*   < >  --    CRP  --   --   --   --   --   --  25.0* 58.0*   < >  --    CRPI  --   --  139.00*  --   --   --   --   --   --   --    G6PD  --   --   --   --  19.0*  --   --   --   --   --    RHF  --   --   --  22*  --   --   --   --   --   --    R5JXBBZ  --  76*  --   --   --   --   --   --   --  113   U3JJMND  --   --   --  15  --   --   --   --   --   --     < > = values in this interval not displayed.       Immune Globulin Studies     Recent Labs   Lab Test 10/27/23  0701 10/24/23  1033 09/15/21  0915 01/31/21  0441 01/25/21  0406 10/27/19  0621 03/01/18  0356 02/19/18  0759     --  1,198 763  --  936 698 1,790*   IGM  --   --   --   --   --   --   --  502*   IGE  --  <2  --   --  <2  --   --  <2   IGA  --   --   --   --   --   --   --  425*   IGG1  --   --   --   --   --   --   --  1,300*   IGG2  --   --   --   --   --   --   --  131*   IGG3  --   --   --   --   --   --   --  101   IGG4  --   --   --   --   --   --   --  1*       Metabolic Studies       Recent Labs   Lab Test 10/31/23  0606 10/30/23  1706 10/28/23  1641 10/28/23  0914 10/28/23  0725 10/27/23  0701 10/26/23  2132 10/26/23  0054 10/25/23  1356 06/27/22  1013 05/26/22  0750 10/22/19  1314 10/21/19  1752    136   < >  --    < > 133*  --    < > 133*   < > 137   < > 133   POTASSIUM 3.6 3.4   < >  --    < > 3.5  --    < > 4.4   < " > 3.5   < > 5.0   CHLORIDE 101 99   < >  --    < > 95*  --    < > 96*   < > 96   < > 109   CO2 26 25   < >  --    < > 26  --    < > 26   < > 32   < > 10*   ANIONGAP 10 12   < >  --    < > 12  --    < > 11   < > 9   < > 13   BUN 21.2 11.0   < >  --    < > 16.0  --    < > 24.6*   < > 42*   < > 66*   CR 2.60* 1.94*   < >  --    < > 3.00*  --    < > 4.90*   < > 3.98*   < > 5.76*   GFRESTIMATED 20* 29*   < >  --    < > 17*  --    < > 10*   < > 12*   < > 8*   * 134*   < >  --    < > 100*  --    < > 92   < > 110*   < > 138*   A1C  --   --   --   --   --   --   --   --   --   --  5.2   < >  --    CHELSEY 8.2* 7.9*   < >  --    < > 8.9  --    < > 8.3*   < > 9.5   < > 8.0*   PHOS 3.1  --    < >  --    < > 2.3*  --    < >  --   --   --    < > 6.7*   MAG  --   --   --   --   --   --  1.5*  --   --    < > 1.8   < > 2.0   LACT  --  0.9  --  0.7  --   --  1.2   < > 1.1   < >  --    < >  --    PCAL  --   --   --   --   --   --   --   --  0.70*  --   --    < >  --    FGTL  --   --   --   --   --  35  --   --   --   --   --    < >  --    CKT  --   --   --   --   --  69  --   --   --   --   --   --  70    < > = values in this interval not displayed.       Hepatic Studies    Recent Labs   Lab Test 10/31/23  0606 10/30/23  0652 10/25/23  1356 10/24/23  1033 07/11/23  0952 05/26/23  0828 05/27/21  1205 05/01/21  0654 01/27/21  0414 01/26/21  0425 01/24/21  1458 11/19/19  1453 11/11/19  1131   BILITOTAL 0.6 0.7   < > 0.9   < >  --    < >  --    < > 0.8 1.2   < > 0.4   29100  --   --   --   --   --  0.5   < >  --    < >  --   --    < >  --    DBIL  --   --   --  0.50*   < >  --    < >  --    < > 0.6*  --    < >  --    ALKPHOS 124* 114*   < > 306*   < >  --    < >  --    < > 184* 244*   < > 316*   49411  --   --   --   --   --  259*   < >  --    < >  --   --    < >  --    PROTTOTAL 5.3* 6.0*   < > 7.4   < >  --    < > 7.2   < > 6.0*  6.0* 6.1*   < > 7.2   55604  --   --   --   --   --  7.1   < >  --    < >  --   --    < >  --    ALBUMIN  2.9* 3.1*   < > 3.6   < >  --    < >  --    < > 2.0* 2.0*   < > 3.0*   22509  --   --   --   --   --  3.2*   < >  --    < >  --   --    < >  --    AST 12 12   < > 39   < >  --    < >  --    < > 11 31   < > 30   84085  --   --   --   --   --  31   < >  --    < >  --   --    < >  --    ALT 19 20   < > 31   < >  --    < >  --    < > 30 35   < > 27   59561  --   --   --   --   --  48   < >  --    < >  --   --    < >  --    LDH  --   --   --   --   --   --   --  164  --  187  --   --   --    GGT  --   --   --   --   --   --   --   --   --   --   --   --  510*   SALAS  --   --   --   --   --   --   --   --   --   --  <10*  --   --     < > = values in this interval not displayed.       Pancreatitis testing    Recent Labs   Lab Test 10/25/23  1356 05/26/22  0750 12/31/19  1033 10/24/19  2032 10/21/19  1451 10/06/19  1444 03/04/18  0353 02/19/18  0759   AMYLASE  --   --   --   --   --   --   --  58   LIPASE 24  --   --  212 119 172   < >  --    TRIG  --  155*   < >  --   --   --    < > 115    < > = values in this interval not displayed.       Gout Labs    No lab results found.    Hematology Studies   Recent Labs   Lab Test 10/31/23  0606 10/30/23  1706 10/30/23  0652 10/29/23  0642 10/28/23  0725 10/27/23  0701 10/27/23  0701 10/17/23  1011 10/11/23  0806   WBC 2.1*  2.1* 3.1* 5.0 5.2 6.1  --  7.6   < >  --    97639  --   --   --   --   --   --   --   --  3.7*  3.7*   ANEU  --   --   --  5.0 5.7  --   --   --   --    ANEUTAUTO 1.4*  --   --   --   --   --  7.2   < >  --    ALYM  --   --   --  0.1* 0.1*   < >  --   --   --    ALYMPAUTO 0.4*  --   --   --   --   --  0.1*   < >  --    SHERRI  --   --   --  0.1 0.1   < >  --   --   --    AMONOAUTO 0.3  --   --   --   --   --  0.1   < >  --    AEOS  --   --   --  0.0 0.2   < >  --   --   --    AEOSAUTO 0.1  --   --   --   --   --  0.1   < >  --    ABSBASO 0.0  --   --   --   --   --  0.0   < >  --    HGB 7.6* 7.9* 8.7* 8.2* 8.2*  --  9.3*   < >  --    20557  --   --   --   --    "--   --   --   --  11.4*  11.4*   HCT 23.8* 24.6* 27.1* 26.0* 25.5*  --  29.4*   < >  --    * 152 192 133* 155  --  149*   < >  --    66262  --   --   --   --   --   --   --   --  167  167    < > = values in this interval not displayed.       Clotting Studies    Recent Labs   Lab Test 10/24/23  1033 08/16/23  0827 05/26/22  0750 09/15/21  0915 02/19/21  0458 02/12/21  0315   INR 0.99 0.9  0.9 0.96 0.96   < >  --    PTT  --   --   --   --   --  28    < > = values in this interval not displayed.       Iron Testing    Recent Labs   Lab Test 10/31/23  0606 02/12/21  0315 02/11/21  0645 02/04/21  0310 02/03/21  0415 12/14/18  0951 12/13/18  1033 09/04/18  1052 08/20/18  0553 08/02/18  1100 08/01/18  0921   IRON  --   --   --   --  51  --  16*  --   --   --  93   FEB  --   --   --   --  143*  --  221*  --   --   --  248   IRONSAT  --   --   --   --  36  --  7*  --   --   --  37   YOLA  --   --   --   --   --   --  302*  --   --   --  571*   *   < > 92   < > 91   < > 107*   < >  --    < > 101*   FOLIC  --   --   --   --   --   --   --   --  43.4  --   --    B12  --   --   --   --   --   --   --   --   --   --  1,753*   HAPT  --   --   --   --   --   --  296*  --   --   --   --    RETP  --   --  2.9*  --   --    < > 4.6*  --   --    < >  --    RETICABSCT  --   --  72.4  --   --    < > 94.2  --   --    < >  --     < > = values in this interval not displayed.       Markers  No lab results found.    Invalid input(s): \"FETOPROTEIN\", \"SERUM\", \"AFP\"    Autoimmune Testing    Recent Labs   Lab Test 10/28/23  1737 10/28/23  0725 10/27/23  0701 05/16/18  1006 02/22/18  1800   RHF  --   --  22*  --   --    DNA  --  1.0  --   --   --    E4BGBTX 76*  --   --  113  --    O1PNMXS  --   --  15  --   --    RNPIGG Negative Negative  --  0.2  --    SMIGG Negative  --   --  <0.2  --    SSAIGG Negative  --   --  2.8*  --    SSBIGG Negative  --   --  7.3*  --    SCLIGG  --  Negative  --  <0.2 <0.2       Arterial Blood Gas Testing " "   Recent Labs   Lab Test 10/17/23  1035 02/10/21  2000 02/10/21  1555 02/09/21  1225 02/09/21  0403 02/08/21  1200   PH  --  7.41 7.36 7.38 7.42 7.40   PCO2  --  42 46* 49* 44 47*   PO2  --  90 105 74* 116* 72*   HCO3  --  26 26 29* 28 29*   O2PER 21.0 50 50 60 60.0 60        Thyroid Studies     Recent Labs   Lab Test 07/11/23  0952 08/01/18  0921 02/19/18  0759   TSH 1.23 1.80 3.07       Urine Studies     Recent Labs   Lab Test 01/24/21  1729 10/21/19  2240 09/12/19  0125 08/07/19  1512 03/04/18  1425   URINEPH 5.0 5.0 7.5* 7.0 5.5   NITRITE Negative Negative Negative Negative Negative   LEUKEST Moderate* Large* Negative Trace* Negative   WBCU 34* 115* 3 19* 5   UWBCLM Present*  --   --   --   --        Medication levels    Recent Labs   Lab Test 10/31/23  0606 10/28/23  0725 10/26/23  0606 03/31/20  1131 03/19/20  1032   VANCOMYCIN  --   --  18.6   < >  --    VCON  --   --   --   --  1.4   TACROL 8.1   < >  --    < > 10.3    < > = values in this interval not displayed.       CSF testing   No lab results found.    Invalid input(s): \"CADAM\", \"EVPCR\", \"ENTPCR\", \"ENTEROVIRUS\"    Body fluid stats    Recent Labs   Lab Test 08/17/23  1144 08/17/23  1137 02/11/22  1308 02/11/22  1308 04/30/21  1937 04/29/21  1315 02/03/21  0941 01/25/21  1347 01/24/21  1629 02/20/20  0800 10/22/19  1430 01/17/19  1207 12/14/18  1054   FTYP  --   --   --   --  Pleural fluid  --   --  Pleural fluid Bronchoalveolar Lavage   < > Ascites   < > Bronchial washing   FCOL Pink* Colorless  --  Colorless Slightly Bloody  --   --  Yellow Pink   < > Yellow   < > [place]   FAPR Hazy* Hazy*   < > Cloudy* Cloudy  --   --  Cloudy Slightly Cloudy   < > Clear   < > Turbid   FRBC  --   --   --   --   --   --   --   --   --   --   --   --  << Do Not Report >>   FWBC 267 236  --  63 9460  --   --  660 355   < > 44   < > 9520   FNEU 7 2   < >  --   --   --   --  87 81   < > 4   < > 94   FLYM 4 4   < > 12  --   --   --   --  5   < > 49  --  1   FMONO 78 88   < >  " --   --   --   --  13  --    < >  --    < > 4   FBAS 1  --   --   --   --   --   --   --   --    < >  --   --   --    FOTH  --   --   --  88 0  --   --   --  11  --  47   < >  --    FALB  --   --   --   --   --   --   --   --   --   --  0.8  --   --    FTP  --   --   --   --  3.3  --   --  3.4  --   --  1.7  --   --    GS  --   --   --   --   --  No organisms seen  Rare  WBC'S seen  predominantly PMN's     < > No organisms seen  Moderate  WBC'S seen  predominantly mononuclear cells   >25 PMNs/low power field  No organisms seen   < > No organisms seen  Few  WBC'S seen  predominantly mononuclear cells    Quantification of host cells and microbiological organisms was done on a cytocentrifuged   preparation.     < > >25 PMNs/low power field  Many  Gram negative diplococci  *  Moderate  Gram positive cocci  *    < > = values in this interval not displayed.       Microbiology:  Fungal testing  Recent Labs   Lab Test 10/27/23  0701 10/26/23  1318 10/17/23  1105 08/17/23  1144 10/07/19  1544 09/14/19  1625   FGTL 35  --   --   --    < > 122   FGTLI Negative  --   --   --    < > Positive*   PJRDFA  --   --  Not Detected Not Detected   < >  --    ASPGAI  --  0.07  --  0.05   < > 1.08   ASPAG  --   --   --  Negative   < >  --    ASPGAA  --  Negative  --   --    < > Positive*   CIABB  --   --   --   --   --  <1:2    < > = values in this interval not displayed.       Last Culture results   S Pneumoniae Antigen   Date Value Ref Range Status   01/24/2021   Final    Negative, no Streptococcus pneumoniae antigen detected by immunochromatographic membrane   assay. A negative Streptococcus pneumoniae antigen result does not rule out infection with   Streptococcus pneumoniae.       P. jirovecii By PCR   Date Value Ref Range Status   10/17/2023 Not Detected  Final     Comment:     NOT DETECTED - A negative result does not rule out the   presence of PCR inhibitors in the patient specimen or   assay specific nucleic acid in  concentrations below the   level of detection by the assay.    This test was developed and its performance characteristics   determined by [facility]. It has not been cleared or   approved by the US Food and Drug Administration. This test   was performed in a CLIA certified laboratory and is   intended for clinical purposes.  Performed By: [facility]  [address] [place] [address]  : Rogelio Llanos MD, PhD  CLIA Number: [id number]   08/17/2023 Not Detected  Final     Comment:     NOT DETECTED - A negative result does not rule out the   presence of PCR inhibitors in the patient specimen or   assay specific nucleic acid in concentrations below the   level of detection by the assay.    This test was developed and its performance characteristics   determined by [facility]. It has not been cleared or   approved by the [place] Food and Drug Administration. This test   was performed in a CLIA certified laboratory and is   intended for clinical purposes.  Performed By: [facility]  [address] [place] [address]  : Rogelio Llanos MD, PhD  CLIA Number: [id number]   08/17/2023 Not Detected  Final     Comment:     NOT DETECTED - A negative result does not rule out the   presence of PCR inhibitors in the patient specimen or   assay specific nucleic acid in concentrations below the   level of detection by the assay.    This test was developed and its performance characteristics   determined by [facility]. It has not been cleared or   approved by the US Food and Drug Administration. This test   was performed in a CLIA certified laboratory and is   intended for clinical purposes.  Performed By: [facility]  [address] [place] [address]  : Rogelio Llanos MD, PhD  CLIA Number: [id number]   01/24/2021 Detected (A)  Final     Comment:     (Note)  DETECTED - P. jirovecii DNA detected in this  specimen.  Results should be used to aid in the diagnosis of PCP  pneumonia and must be interpreted in the context of host  risk factors, clinical presentation, and radiographic  imaging.  TEST INFORMATION: Pneumocystis jirovecii Detection by PCR  Test developed and characteristics determined by [facility]. See Compliance Statement B: [employer]  Performed by [facility],  [address] [place] [place] [address] [phone]  [email or web address], Carri Red MD, Lab. Director       Culture   Date Value Ref Range Status   10/26/2023   Final    >10 Squamous epithelial cells/low power field indicates oral contamination. Please recollect.   10/25/2023 No Growth  Final   10/25/2023 No Growth  Final   10/17/2023 1+ Actinomyces species (A)  Final     Comment:     Susceptibilities not routinely done, refer to antibiogram to view typical susceptibility profiles  This organism is part of normal alo, but on occasion may be a true pathogen. Clinical correlation must be applied to interpreting this result.   10/17/2023 1+ Normal alo  Final   10/17/2023 No growth after 13 days  Preliminary   10/17/2023 No growth after 13 days  Preliminary   08/17/2023 No Growth  Final   08/17/2023 No Growth  Final   08/17/2023 1+ Normal alo  Final   08/17/2023 1+ Stenotrophomonas maltophilia (A)  Final     Comment:     Susceptibilities done on previous cultures   08/17/2023 No Growth  Final   08/17/2023 No Growth  Final   08/17/2023 1+ Normal alo  Final   08/17/2023 1+ Stenotrophomonas maltophilia (A)  Final   02/11/2022 No Actinomyces isolated  Final   02/11/2022 No Growth  Final   02/11/2022 No Growth  Final   09/23/2021   Final    >10 Squamous epithelial cells/low power field indicates oral contamination. Please recollect.   09/23/2021 No Growth  Final     Culture Micro   Date Value Ref Range Status   05/01/2021   Final    Quantity not sufficient  Called to Maxim Norman at 1236 5/1/21.    mlb     05/01/2021 Culture negative  "after 30 days  Final   04/29/2021 No anaerobes isolated  Final   04/29/2021 No growth  Final   02/19/2021 Culture negative for acid fast bacilli  Final   02/19/2021   Final    Assayed at [facility] [address] [place] [address], [place] [address] [phone]   02/19/2021 Culture negative after 4 weeks  Final   02/19/2021 No growth after 4 weeks  Final   02/19/2021 Moderate growth  Staphylococcus epidermidis   (A)  Final   02/09/2021 No growth  Final         Last checks of Clostridioides difficile testing  Recent Labs   Lab Test 10/31/23  0732 02/13/21  0530 12/04/18  1500 08/02/18  2253   CDBPCT Negative Negative Negative Positive*       Syphilis Testing    No results found for: \"TREPT\", \"TREPAB\", \"RPR\", \"TPPAT\", \"CVD\"    Quantiferon testing   Recent Labs   Lab Test 10/31/23  0606 10/29/23  0642 09/20/21  1903 05/01/21  1102 02/22/21  0527 02/19/21  0853 02/11/21  0645 01/25/21  1347 01/24/21  1629 10/28/19  0553 10/25/19  1628 10/06/19  1444 09/14/19  1625 02/21/18  1439 02/19/18  0759   TBRES  --   --   --   --   --   --   --   --   --   --  Negative  --  Negative  --   --    TBRSLT  --   --   --   --   --   --   --   --   --   --   --   --   --   --  Negative   TBAGN  --   --   --   --   --   --   --   --   --   --   --   --   --   --  0.00   LYMPH 18 2   < >  --    < >  --    < >  --   --    < >  --    < >  --    < > 8.4   AFBSMS  --   --   --  Quantity not sufficient  Called to Dr Maxim Norman at 1236 5/1/21.    mlb    --  Negative for acid fast bacteria  Less than 5ml of specimen received.  A minimum of 5 mL of sputum or fluid is recommended for recovery of acid fast bacilli   (AFB).  Volumes less than 5 mL are suboptimal and may compromise recovery of AFB from   culture.    Assayed at [facility] [address] [place] [id number] [phone]  --  Negative for acid fast bacteria  Less than 5ml of specimen received.  A minimum of 5 mL of sputum or fluid is recommended for recovery of acid fast " "bacilli   (AFB).  Volumes less than 5 mL are suboptimal and may compromise recovery of AFB from   culture.    Assayed at [facility] [address] [facility], [place] [id number] [phone] Negative for acid fast bacteria  Less than 5ml of specimen received.  A minimum of 5 mL of sputum or fluid is recommended for recovery of acid fast bacilli   (AFB).  Volumes less than 5 mL are suboptimal and may compromise recovery of AFB from   culture.    Assayed at [facility] [address] [facility], [place] [id number] [phone]   < >  --    < >  --    < >  --     < > = values in this interval not displayed.       Infection Studies to assess Diarrhea  No lab results found.    Invalid input(s): \"BIDYD\"    Virology:  Coronavirus-19 testing    Recent Labs   Lab Test 07/11/23  1132 10/12/22  1528 09/26/22  0956 05/23/22  1013 04/01/22  1257 02/07/22  1301 01/31/22  1309 01/24/22  1324 09/20/21  1859 09/15/21  0623 06/07/21  1310 05/02/21  0715 04/26/21  1151 04/08/21  0723   [id number]  --   --   --   --   --   --   --   --   --   --   --  Negative  --   --    [id number]  --   --   --   --   --   --   --   --   --   --   --  Not Applicable  --   --    [id number] Negative  --   --   --   --   --   --   --  Negative Negative  --   --   --  Test received-See reflex to IDDL test SARS CoV2 (COVID-19) Virus RT-PCR  NEGATIVE   [id number]  --   --   --   --   --   --   --   --   --   --   --   --   --  Nasopharyngeal   [id number]  --   --   --   --   --   --   --   --   --   --   --   --   --  Nasopharyngeal   COVIDPCREXT  --  Negative Negative Undetected   < > Undetected Undetected   < >  --   --    < >  --   --   --    SOUREXT  --   --   --  Nasopharynx  --  Nasopharynx Nasopharynx   < >  --   --    < >  --    < >  --    [id number]  --   --   --  Undetected  --  Undetected Undetected   < >  --   --    < >  --    < >  --     < > = values in this interval not displayed.       Respiratory virus (non-coronavirus-19) testing    Recent " Labs   Lab Test 10/27/23  0449 08/17/23  1144 08/17/23  1137 01/24/21  1822 01/24/21  1629 12/23/20  1102 02/27/18  1016 02/22/18  0900   RVSPEC  --   --   --   --  Bronchial Bronchial   < >  --    AFLU  --   --   --   --   --   --   --  Duplicate request*   IFLUA Not Detected Not Detected Not Detected   < > Negative Negative   < >  --    INFZA  --   --   --   --   --   --   --  Negative   FLUAH1 Not Detected Not Detected Not Detected   < > Negative Negative   < >  --    [id number] Not Detected Not Detected Not Detected   < > Negative Negative   < >  --    FLUAH3 Not Detected Not Detected Not Detected   < > Negative Negative   < >  --    BFLU  --   --   --   --   --   --   --  Duplicate request*   IFLUB Not Detected Not Detected Not Detected   < > Negative Negative   < >  --    INFZB  --   --   --   --   --   --   --  Negative   PIV1 Not Detected Not Detected Not Detected   < > Negative Negative   < >  --    PIV2 Not Detected Not Detected Not Detected   < > Negative Negative   < >  --    PIV3 Not Detected Not Detected Not Detected   < > Negative Negative   < >  --    PIV4 Not Detected Not Detected Not Detected   < >  --   --    < >  --    IRSV  --   --   --   --   --   --   --  Negative   HRVS  --   --   --   --  Negative Negative   < >  --    RSVA Not Detected Not Detected Not Detected   < > Negative Negative   < >  --    RSVB Not Detected Not Detected Not Detected   < > Negative Negative   < >  --    HMPV Not Detected Not Detected Not Detected   < > Negative Negative   < >  --    ADVBE  --   --   --   --  Negative Negative   < >  --    ADVC  --   --   --   --  Negative Negative   < >  --    ADENOV Not Detected Not Detected Not Detected   < >  --   --    < >  --    CORONA Not Detected Not Detected Not Detected   < >  --   --    < >  --     < > = values in this interval not displayed.       CMV viral loads    Recent Labs   Lab Test 10/24/23  1033 10/17/23  1011 10/11/23  0806 10/04/23  0810 09/07/23  0710 08/17/23  1144  "08/17/23  1137 08/08/23  0828 08/08/23  0828 07/11/23  0952 05/26/23  0828 04/06/23  0725 03/08/23  0848 02/09/23  1034 11/18/22  0928 09/27/22  1012 06/27/22  1013 05/26/22  0750 03/03/22  1054 02/11/22  1308 02/10/22  0919 11/10/21  1106 10/12/21  1251 09/29/21  1105 09/21/21  1055 03/15/21  0904 02/25/21  0542   CMVQNT  --   --   --   --   --   --   --   --   --  Not Detected  --  Not Detected  --  <137*  --  Not Detected  --  <137*  --  Not Detected Not Detected  --  Not Detected  --  Not Detected  --  CMV DNA Not Detected   CMVRESINST 103*  80* 75*  --   --  521*  --   --   --  46*  --   --   --   --   --   --   --   --   --   --   --   --   --   --   --   --   --   --    CSPEC  --   --   --   --   --   --   --   --   --   --   --   --   --   --   --   --   --   --   --   --   --   --   --   --   --   --  EDTA PLASMA   CMVLOG 2.0  1.9 1.9  --   --  2.7  --   --    < > 1.7  --   --   --   --  <2.1  --   --   --  <2.1   < >  --   --   --   --   --   --   --  Not Calculated   18354  --   --  2,680*  2,680*   < >  --   --   --   --   --   --    < >  --    < >  --    < >  --    < >  --    < >  --   --    < >  --    < >  --    < >  --    CMVQAL  --   --   --   --   --  Not Detected Not Detected  --   --   --   --   --   --   --   --   --   --   --   --   --   --   --   --   --   --   --   --     < > = values in this interval not displayed.       CMV resistance testing  Recent Labs   Lab Test 08/03/18  0624   CMVCID Sensitive   CMVFOS Sensitive   CMVGAN Sensitive       No results found for: \"H6RES\"    EBV DNA Copies/mL   Date Value Ref Range Status   10/28/2023 10,175 (H) <=0 copies/mL Final   08/08/2023 42,461 (H) <=0 copies/mL Final   07/11/2023 49,591 (H) <=0 copies/mL Final   04/06/2023 43,506 (H) <=0 copies/mL Final   03/01/2023 83,207 (H) <=0 copies/mL Final   02/09/2023 114,508 (H) <=0 copies/mL Final   09/27/2022 21,749 (H) <=0 copies/mL Final   05/26/2022 77,030 (H) <=0 copies/mL Final   02/10/2022 135,117 " "(H) <=0 copies/mL Final   10/12/2021 969,411 (H) <=0 copies/mL Final   09/15/2021 45,122 (H) <=0 copies/mL Final   05/01/2021 38,186 (A) EBVNEG^EBV DNA Not Detected [Copies]/mL Final   02/22/2021 75,709 (A) EBVNEG^EBV DNA Not Detected [Copies]/mL Final   01/25/2021 5,619 (A) EBVNEG^EBV DNA Not Detected [Copies]/mL Final   12/09/2020 10,686 (A) EBVNEG^EBV DNA Not Detected [Copies]/mL Final   09/09/2020 2,935 (A) EBVNEG^EBV DNA Not Detected [Copies]/mL Final   03/09/2020 8,918 (A) EBVNEG^EBV DNA Not Detected [Copies]/mL Final     EBV DNA Quant (External)   Date Value Ref Range Status   10/11/2023 <35 (A) Undetected IU/mL Final   05/26/2023 <35 (A) Undetected IU/mL Final   03/08/2023 <35 (A) undetected IU/ml Final   03/08/2023 <35 (A) Undetected IU/mL Final   01/13/2023 Undetected Undetected IU/mL Final     EBV Interp DNA Quant (External)   Date Value Ref Range Status   03/08/2023   Final    EBV DNA is detected, but level present is <35 IU/mL (<1.54 log IU/mL). This assay cannot accurately quantify EBV DNA below this level.       BK Virus Result   Date Value Ref Range Status   08/07/2019 BK Virus DNA Not Detected BKNEG^BK Virus DNA Not Detected copies/mL Final       Parvovirus Testing  No lab results found.    Invalid input(s): \"PRVRES\"    Adenovirus Testing  No lab results found.    Invalid input(s): \"ADENAB\", \"ADENOVIRUS\", \"ADQT\"    Hepatitis B Testing     Recent Labs   Lab Test 10/26/23  1233 09/21/21  1055 10/25/19  1800 06/05/19  1156 06/05/18  1029 03/01/18  0356   AUSAB 0.67 0.50   < >  --   --  0.41   HBCAB  --   --   --  Nonreactive  --  Nonreactive   HEPBANG Nonreactive Nonreactive   < > Nonreactive   < > Nonreactive    < > = values in this interval not displayed.        Hepatitis C Antibody   Date Value Ref Range Status   06/05/2019 Nonreactive NR^Nonreactive Final     Comment:     Assay performance characteristics have not been established for newborns,   infants, and children     02/19/2018 Nonreactive " NR^Nonreactive Final     Comment:     Assay performance characteristics have not been established for newborns,   infants, and children         CMV Antibody IgG   Date Value Ref Range Status   03/01/2018 Canceled, Test credited 0.0 - 0.8 AI Final     Comment:     Test reordered as correct code  SEE CMV QUANTITATIVE PCR     03/01/2018 >8.0 (H) 0.0 - 0.8 AI Final     Comment:     Positive  Antibody index (AI) values reflect qualitative changes in antibody   concentration that cannot be directly associated with clinical condition or   disease state.     02/19/2018 >8.0 (H) 0.0 - 0.8 AI Final     Comment:     Positive  Antibody index (AI) values reflect qualitative changes in antibody   concentration that cannot be directly associated with clinical condition or   disease state.       Varicella Zoster Virus Antibody IgG   Date Value Ref Range Status   02/19/2018 3.8 (H) 0.0 - 0.8 AI Final     Comment:     Positive, suggests prev. exposure and probable immunity  Antibody index (AI) values reflect qualitative changes in antibody   concentration that cannot be directly associated with clinical condition or   disease state.       EBV Capsid Antibody IgG   Date Value Ref Range Status   03/01/2018 >8.0 (H) 0.0 - 0.8 AI Final     Comment:     Positive, suggests recent or past exposure  Antibody index (AI) values reflect qualitative changes in antibody   concentration that cannot be directly associated with clinical condition or   disease state.     02/19/2018 >8.0 (H) 0.0 - 0.8 AI Final     Comment:     Positive, suggests recent or past exposure  Antibody index (AI) values reflect qualitative changes in antibody   concentration that cannot be directly associated with clinical condition or   disease state.       Toxoplasma Antibody IgG   Date Value Ref Range Status   02/19/2018 <3.0 0.0 - 7.1 IU/mL Final     Comment:     Negative- Absence of detectable Toxoplasma gondii IgG antibodies. A negative   result does not rule out acute  infection.  The magnitude of the measured result is not indicative of the amount of   antibody present. The concentrations of anti-Toxoplasma gondii IgG in a given   specimen determined with assays from different manufacturers can vary due to   differences in assay methods and reagent specificity.       Herpes Simplex Virus Type 1 IgG   Date Value Ref Range Status   03/01/2018 >8.0 (H) 0.0 - 0.8 AI Final     Comment:     Positive.  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  Antibody index (AI) values reflect qualitative changes in antibody   concentration that cannot be directly associated with clinical condition or   disease state.     02/19/2018 >8.0 (H) 0.0 - 0.8 AI Final     Comment:     Positive.  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  Antibody index (AI) values reflect qualitative changes in antibody   concentration that cannot be directly associated with clinical condition or   disease state.       Herpes Simplex Virus Type 2 IgG   Date Value Ref Range Status   03/01/2018 <0.2 0.0 - 0.8 AI Final     Comment:     No HSV-2 IgG antibodies detected.  Antibody index (AI) values reflect qualitative changes in antibody   concentration that cannot be directly associated with clinical condition or   disease state.     02/19/2018 <0.2 0.0 - 0.8 AI Final     Comment:     No HSV-2 IgG antibodies detected.  Antibody index (AI) values reflect qualitative changes in antibody   concentration that cannot be directly associated with clinical condition or   disease state.         Imaging:  Recent Results (from the past 48 hour(s))   US Upper Extremity Venous Duplex Left    Narrative    EXAMINATION: DOPPLER VENOUS ULTRASOUND OF THE LEFT UPPER EXTREMITY,  10/30/2023 1:58 PM     COMPARISON: Left upper extremity venous duplex 10/20/2023    HISTORY: Ongoing left upper extremity swelling, reevaluate for  possible clot    TECHNIQUE:  Gray-scale evaluation with compression, spectral flow and  color Doppler assessment of the deep venous system of the  left upper  extremity.    FINDINGS:  Left: No thrombus is demonstrated in the internal jugular, innominate,  subclavian, and axillary veins. There is normal compressibility of the  basilic and cephalic veins. There is normal compressibility of the  upper and lower portions of the brachial vein; Please note the middle  portion of the brachial vein was not evaluated due to an overlying  bandage.    There is retrograde flow in the left axillary vein, mid subclavian  vein, and left cephalic vein near its confluence to the subclavian  vein, likely related to dialysis access.      Impression    IMPRESSION:  No evidence of left upper extremity deep venous thrombosis.    I have personally reviewed the examination and initial interpretation  and I agree with the findings.    JAMES LAI MD         SYSTEM ID:  [id number]   XR Abdomen 1 View    Narrative    EXAMINATION:  XR ABDOMEN 1 VIEW 10/30/2023 6:04 PM     COMPARISON: CT abdomen and pelvis 10/28/2023    HISTORY: acute abdominal pain    TECHNIQUE: Supine frontal views of the abdomen.    FINDINGS: Air-filled, nondilated loops of small and large bowel.  Vascular calcifications visualized within the pelvis. Partially  visualized sternotomy wires appear intact..  No free air, pneumatosis  or portal venous gas.       Impression    IMPRESSION: Nonobstructive bowel gas pattern.    I have personally reviewed the examination and initial interpretation  and I agree with the findings.    ANA CRISOSTOMO DO         SYSTEM ID:  [id number]   XR Chest 1 View    Narrative    EXAM: XR CHEST 1 VIEW 10/30/2023 6:04 PM      HISTORY: interval follow up, lung transplant.    COMPARISON: CT PE and chest radiograph 10/27/2023.     TECHNIQUE: Frontal view of the chest.    FINDINGS:   Median sternotomy wires appear intact. Postoperative clips scattered  throughout the mediastinum. Perihilar and basilar predominant mixed  airspace and interstitial opacities are not significantly  changed.  Bilateral costophrenic angles are blunted. Cardiac silhouette stable.  No appreciable pneumothorax.      Impression    IMPRESSION:   1. Persistent perihilar bibasilar mixed interstitial airspace  opacities, not significantly changed compared to 10/27/2023.  2. Small bilateral pleural effusions.    I have personally reviewed the examination and initial interpretation  and I agree with the findings.    ANA CRISOSTOMO,          SYSTEM ID:  [id number]     CT CHEST 10/24/2023                                                                   IMPRESSION: Worsening consolidation bilaterally concerning for  worsening infection. New prominent left upper lobe anterior  pleural-based soft tissue thickening. Recommend follow to clearing.  Neoplasm somewhat less likely given the short interval but cannot be  entirely excluded. Mild air trapping.  Ectasia of the mid ascending thoracic aorta.  Left breast skin thickening and edema in the breast tissues again  present. Rim calcified almost certainly benign density at the left  lung base. Trace pleural effusions bilaterally.  Unchanged osteopenic T5 compression deformity.  Atherosclerosis.

## 2023-10-31 NOTE — PLAN OF CARE
Goal Outcome Evaluation:                 Outcome Evaluation: Pt A/Ox4, VSS on 2L NC when resting (up to 4L NC when ambulating). Pt reported x1 BM today (no longer watery). Up with A1 in halls. Creams/lotion in use for pts rash (derm following). US ordered to look at pts fistula (unable to be done today d/t pt not being avaialble). Chest MRI done to look at cause of fluid around fistula as well (in process). Pt endorses non-productive cough, PRN tessalon pearls in use. Lymph consulted to address LUE fluid overload but could not wrap d/t fistula being present at that location. Pt had HD run post MRI and had 2L removed per HD report. Pt remains on empric abx coverage for possible PNA (PO azithro and IV zosyn). Plan is for pt to have bronch tomorrow, pt to be NPO at [facility]

## 2023-10-31 NOTE — PROGRESS NOTES
[facility]    Medicine Progress Note - Medicine Service, JOSE TEAM 3    Date of Admission:  10/25/2023    Assessment & Plan   Kecia Blue is a 62 year old female admitted on 10/25/2023. 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    Today:  - MRI Chest w/ contrast to assess LUE swelling and possible DVT or mass/obstruction  - HD w/ UF following MRI w/ contrast  - US LUE AV dialysis fistula   - C/w with prednisone burst (begin 15 mg dose Tues 10/31) per rheum  - Interventional pulm to complete repeat bronchoscopy tomorrow 11/1/23    #LUE swelling, unchanged  #LLE swelling, stable- improved  #L breast rash, chronic >1 yr  #Lymphadenopathy (?reactive)- retroperitoneal, bilateral axillary, L retropectoral regions  Pt's L arm appearing asymmetrically swollen compared to R arm, initially noted 10/27/23. L arm swelling was a notable change from previous days of hospitalization.  On 10/28, LLE also more swollen and erythematous vs RLE.  Edema significant to the point that wedding ring had to be cut off with mechanical saw. D/t presence of unilateral extremity swelling, there is concern for DVT.  LUE US performed 10/27, 10/28, 10/30 all negative for DVT. B/l LE US 10/26, 10/28 negative for PE. Pt also has approx 1 yr history of peau d'orange appearance of L breast- of note had mammogram in 02/2023 which was negative for malignancy. Pt also at risk of post transplant lymphoproliferative  disorders given hx of b/l lung transplant c/b EBV virema. Multiple prominent retroperitoneal and L periaortic (?reactive) lymph nodes noted on CT abdomen 10/28, PTLD not ruled out, per pulm transplant team. CT chest 10/28 with no evidence of PE, but multiple b/l axillary and L retropectoral (?reactive) lymph nodes noted, L breast skin thickening and breast edema of uncertain etiology noted. Have discussed c/f fistula issue v. steal syndrome with nephrology who after chart review and physical exam feels confident this is not source of edema, did not identify indication to pursue fistulogram at this point; however radiology recommending US of LUE AV fistula as reasonable initial test to assess for fistula dysfunction. Appreciating input from radiology for recommended imaging modalities for further assessment, considering compressive mass contributing to SVC picture as possible etiology of LUE swelling.   - US LUE AV dialysis fistula - assess for etiology of LUE swelling   - MRI Chest w/ contrast to assess LUE swelling and possible DVT or mass/obstruction   -If MRI unremarkable for DVT/mass/obstruction and if LUE swelling persists, will reach back out to radiology to reconsider CTV for SVC syndrome    - If CTV recommended by radiology, will proceed with this    - If radiology recommends against CTV, will plan to reach out to vascular team for further recs  - Pulm transplant following:   - Appreciate pulm transplants perspective on PTLD as etiology of edema    #Erythroderma  #Diffuse erythema, resolved  #Diffuse pruritus, resolved  #C/f recurrent dermatomyositis  Pt noted to have blanchable erythema diffusely throughout torso and extremities upon admission while in ED. Appears was acute in onset. Differential including recurrent dermatomyositis, viral exanthem (hx EBV viremia, CMV viremia on VGCL), or drug eruption. Pt had received zosyn prior to rash in ED, along with Augmentin the day before arrival to ED. Rash  appeared prior to vancomycin being administered in ED, this was confirmed by ED pharmacist on staff day of admission. Dermatology consulted; not felt to be drug rash; unsure if acute DM flare d/t absence of shawl sign, holster sign, muscle weakness, however Pt with periorbital edema and plaques suggestive of DM. Diffuse erythema resolving, no pruritus since day of admission; however Pt now with near full-body erythrodermic eruption involving face, trunk, extremities. Rheumatology also following d/t concern that skin presentation could be 2/2 DM flare. Rheum panels ordered and being followed by rheum. Per Rheum, unlikely to be acute DM flare, however, reasonable to treat rash with prednisone burst and topical steroids, which was discussed and approved with transplant pulmonology. Cutaneous findings improving in response to steroids.   - Dermatology following:   -No indication for skin biopsy at this time  - Rheumatology following:   -Dermatomyositis serologies / rheum panels followed by rheum   -Prednisone 20mg x3 days (completed), 15mg x3 days (current), 10mg x3 days then return to pta 5mg qday  - c/w Atarax 25mg q6h PRN for itching    #CMV viremia  #Leukopenia  Low-grade CMV viremia since 8/2023. Has been following with transplant ID team. Was started on VGCV three times weekly after HD at approx 10/6/23. VGCV dosing increased to daily beginning 10/27 given persisting low-level viremia on repeat CMV levels. WBC trending downwards over the past week 11.6 (10/25) --> 2.1 (10/31) following change in dosing frequency of VGCV.   -ID transplant following:  -c/w valgancyclovir 450 mg daily for 3 weeks (10/27-p), or until x2 negative CMV checks, whichever is longer. Then return to valgancyclovir three times weekly on dialysis days after receiving dialysis.   -Per ID transplant, may need to decrease VGCV dose back to three times per week or even stop, since CMV viremia is low-grade.   -Repeat CMV levels per transplant  ID/transplant pulm    #Acute abdominal pain, LLQ, resolving  #Watery diarrhea, resolved  #h/o C diff colitis  Sudden onset of acute LLQ abdominal pain in afternoon of 10/30. for sudden acute abdominal pain. Had a more formed BM this morning, no other stooling since. Anorexia currently without emesis. Lactate, CBC, BMP all wnl. AXR with nonobstructive bowel gas pattern. Etiology possibly 2/2 gastroenteritis. Pt with 6 watery BM's evening of 10/29. No watery diarrhea since 10/29. Two BM's that were more formed 10/30 and one again on morning of 10/31. Pt appears to have remote hx of C. Diff colitis. C diff toxin and enteric bacteria/virus panel PCR was initially ordered 10/30 in setting of new acute onset watery diarrhea night prior. Per lab policy and given Pt's resolution of watery diarrhea, deferring further infectious workup of possible gastroenteritis at this time.   - No further w/u at this time. Will continue to monitor for changes in BMs/worsening of sxs.    #Acute hypoxic respiratory failure, improving  #Recurrent right mainstem bronchial stenosis s/p several dilations  #Pneumonia, improving  #Multifocal progressive pulmonary consolidations  #Chronic lung allograft dysfunction  #Actinomyces (+) BAL (10/17)  #ILD 2/2 anti-synthetase syndrome s/p B/L lung transplant 3/2018  #Hx Stenotrophomonas pneumonia  #Hx EBV viremia  #Hx PJP  Pt with 2month hx of cough c/f pneumonia. Hx BL lung transplant (2018) c/b right mainstem bronchial stenosis requiring serial treatments with balloon bronchoplasty procedures, most recently on 10/17/23.  CT Chest from 10/24 notable for worsening consolidation BL c/f worsening infection, along with new prominent KONRAD soft tissue thickening. BAL on 10/17 notable actinomyces. Pt also has hx of stenotrophomonas from 08/2023. Completing infectious work-up with UA, Bcx x2. MRSA/MSSA PCR 10/25/23 negative.  Blood cultures collected 10/25/23, pending results. CrAg, HBV, Respiratory panel PCR  10/26 returned negative. Chest CT and serial CXR findings of bilateral perihilar airspace opacification, and small b/l pleural effusions remaining largely during hospitalization, no pneumothorax. For CLAD, PTA was on azithromycin, singulair, advair. Azithromycin held for period during current admission d/t prolonged Qtc on EKG, but was restarted 10/30 after Qtc normalization on EKG. Echo 10/27/23 showing normal appearing RV, pulmonary hypertension noted. Cough improving w/ use of tessalon pearles. Remaining transplant ID and transplant pulmonary work-up pending results.  -Interventional pulm consulted:   -bronchoscopy scheduled for 11/1/23 possible dilatation +/- stent placement   -Transplant ID following:  -c/w zosyn through planned dilation tomorrow 11/1. Then can switch to augmentin PO through 11/3 for 10d course for post-obstruction pneumonia.   -C/w tessalon pearles 100mg q4h PRN  -Scheduled nebs for breathing  -Pulm transplant following:   -Wean supplemental O2 PRN to maintain o2 saturations >92%   -DSA report 10/30, interpretation per pulm transplant  -ImmuKnow (10/27) pending  -EBV levels per transplant pulm   -c/w azathioprine 50mg qday.   -c/w tacrolimus. Check levels per translplant pulm   -c/w Bactrim for PJP ppx  -c/w azithromycin, singulair + advair for CLAD    #Recurrent transient hypotension w/o altered mentation, improving  Rapid response was called overnight on 10/26 and 10/27 d/t hypotension, tachycardia, and tachypnea. Doppler venous US of bilateral lower extremities on 10/26 with no DVT. PTA metoprolol held. Lactate 1.2. Temperature remains < 100.4* F. Hypotension responsive to albumin and midodrine. Bedside cardiac ultrasound  10/27 with collapsible IVC. Main concern on differential remains sepsis 2/2 suspected pulmonary infection, drug reaction, or possible adrenal insufficiency. However, episodic hypotension may be within Pt's normal range, as Pt continues to remain asymptomatic during RRTs.  TTE completed 10/27 w/ EF 60-65%. Pulmonary hypertension noted. Of note, started on steroids for rash on 10/28, no RRTs since 10/28.  - C/w midodrine 10 mg TID     #Prolongation of Qtc, resolved  #Arrythmia (Afib)  EKG 10/26/23 with prolonged Pzg=627; normalized to 455 on EKG 10/30.  EKG 10/27 demonstrating Afib.    -Repeat EKG later this week (ordered for Thurs 11/2)    #ESRD on HD (M, W, F)  #Anemia of CKD, macrocytic  #Hypophosphatemia, resolved  #Hx Hyperphosphatemia  Hx of ESRD on HD, currently M, W, F.  Phoslo held on 10/29 due downtrending phos; recheck notable for phos 1.1; required IV + PO supplementation with resolution of hypophosphatemia, continuing to monitor trend. Hg trending downwards, will appreciate nephrology recs.  -C/w iHD per renal dialysis team  -ESRD service consulted:   -c/w pta vitamins   -Epo/iron per nephrology   -hold phoslo d/t hypophosphatemia  -Renal panel daily    #Hx Left Sided Aspergillus Empyema:  First noted in 2019. Needle aspiration confirmatory of aspergillus fumigatus, now s/p intrapleural amphotericin bead placement. Discussions had regarding surgery, however, surgery felt to be associated with too high of morbidity, and indefinite posaconazole decided as plan.  -Transplant pulmonology following   - c/w PTA posaconazole. Check levels per transplant pulm.    Diet: Renal Diet (dialysis)    DVT Prophylaxis: Heparin SQ  Lines: None     Cardiac Monitoring: None  Code Status: Full Code        Disposition Plan      Expected Discharge Date: 11/02/2023      Destination: home with family  Discharge Comments: Dispo: home w/ family, OP HD  Plan: AV fistula US and MRI chest; possible Sup.VenaCava Syndrome?          The patient's care was discussed with the Attending Physician, Dr. Rey .    Donny Shah MD  Medicine Service, [facility] TEAM 3  [facility]  Securely message with Last 2 Left (more info)  Text page via [employer] Paging/Directory   See  signed in provider for up to date coverage information    Resident/Fellow Attestation   I, Donny Shah MD, was present with the medical/MARIA A student who participated in the service and in the documentation of the note.  I have verified the history and personally performed the physical exam and medical decision making.  I agree with the assessment and plan of care as documented in the note.      Donny Shah MD  PGY2  Date of Service (when I saw the patient): 10/31/23  ______________________________________________________________________    Interval History   - Had total of 2 BM's yesterday, more formed than on 10/29 (when Pt had watery diarrhea x6). BM again this morning, was formed as well. Pt states BM's no longer watery since 10/29.  - Abdominal pain improved from yesterday. Still noted when Pt coughs, otherwise denies pain.  - Received ultra-filtration yesterday. Pt states notable improvement in cough following UF.   - Pt states rash improving, appears improving as well. Appears responding well to topical steroids.  Pt noted having similar full-body dry flaky rash earlier this past summer, resolved after approx 1 wk. More involvement of fingertips currently, skin has broken. Pt now wearing mittens over hands.     Physical Exam   Vital Signs: Temp: 97.8  F (36.6  C) Temp src: Oral BP: 121/70 Pulse: 86   Resp: 22 SpO2: 100 % O2 Device: Nasal cannula Oxygen Delivery: 2 LPM  Weight: 140 lbs 14.4 oz    General Appearance: Awake, alert, pleasant. Wearing mittens on hands d/t erythrodermic eruption involving fingertips.  Respiratory: Wheezing in R upper lobe, crackles in L upper/middle lobe.   Cardiovascular: RRR, normal S1/S2, no m/r/g.   Skin: diffuse erythrodermic eruption involving face, torso, extremities. Fingertips with broken skin.   Extremities: LUE remains swollen. AV fistula on LUE noted, appearing unchanged. LE edema improved from yesterday, 1+ bilaterally, L mildly > R LE.     Data   NOTE: Data  reviewed over the past 24 hrs contributes toward MDM complexity

## 2023-10-31 NOTE — PLAN OF CARE
Goal Outcome Evaluation:      Plan of Care Reviewed With: patient    Overall Patient Progress: no changeOverall Patient Progress: no change    Outcome Evaluation: Pt A&Ox4, VSS on 2L NC. Encouraging sleep tonight per pt request. Tessalon given x1 for cough at bedtime. Pt had one BM. Stool sample still needs to be collected. Pt has pinkish skin with some flaking present. L arm more swollen than R arm. R PIV TKO. L arm fistula. Plan for MRI today; checklist done. Pt will also get hemodialysis today and possible IP lymphedema evaluation. Abd xray showed nothing significant. CXR showed small BL pleural effusions. Plan for possible bronchoscopy on 11/1. Continue with POC.

## 2023-10-31 NOTE — CONSULTS
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  Fortunately, her cutaneous findings and symptoms are improving.  Rheumatological work-up remains pending with resulted labs notable for negative EVELIN panel RNP antibody, CCP, double-stranded DNA.  C4 is within normal limits, and C3 is slightly low at 76.  Myomarker panel, RNP, Felisa 1, scleroderma panel remain pending.  EBV and CMV PCRs pending.    The differential diagnosis includes recurrence of dermatomyositis versus viral exanthem or drug eruption, although there are no clear culprits to indicate the latter.  She does have some findings that may be consistent with dermatomyositis including periorbital edema and plaques, as well as scaling and erosions of the distal fingertips that can be seen in dermatomyositis.  However, the patient lacks the findings of dermatomyositis including shawl sign, holster sign, and muscle weakness.  Given that she is improving at this time, recommend continuing to treat supportively with topical steroids, short prednisone taper and follow rheumatologic panels.  No indication to biopsy at this time.      Recommendations:  -Change topical steroids from ointment to cream vehicle base per patient preference: Triamcinolone 0.1% cream twice daily to body, hydrocortisone 2.5% cream twice daily to face  - Continue to follow remaining autoantibodies  - Await EBV and CMV PCR  - Appreciate rheumatology recommendations  - Agree with continuing short prednisone taper outlined per  "rheumatology    Thank you for the dermatology consultation. Please do not hesitate to contact the dermatology resident/faculty on call for any additional questions or concerns.      Staffed with attending physician, Dr. Garima Morley MD PGY-3  Dermatology Resident   I, Miranda Painting MD, reviewed the medical record and photos of this patient with the resident and agree with the resident s findings and plan of care as documented in the resident s note.      Dermatology Problem List:  1. Erythroderma  - ddx dermatomyositis vs drug eruption (no clear culprits)  - Current treatment: Triamcinolone 0.1% cream twice daily to body, hydrocortisone 2.5% cream twice daily to face  2. Hx dermatomyositis with antisynthetase syndrome  - Anti-Jo1, SSA positive previously  - ILD leading to bilateral pulmonary transplant in 2014  - History of CMV and EBV viremia    Date of Admission: Oct 25, 2023   Encounter Date: 10/30/2023      Interval history:    Patient was interviewed today with her  at bedside.  Together, they state that the patient is quite tired.  They think that her rash is improving.  She appears \"less red\", with the rash now leading to dry, peeling skin.  She has not been applying topical steroids very frequently because she does not like the texture, especially with the greasy ointment base.  She continues to have some shortness of breath requiring supplemental oxygen, which is not present at baseline.  She denies any muscle weakness.  She is unsure if her current cutaneous symptoms are similar to those that she had in 2014 when she was diagnosed with dermatomyositis and antisynthetase syndrome.    Medications:  Current Facility-Administered Medications   Medication    acetaminophen (TYLENOL) tablet 325 mg    acetylcysteine (MUCOMYST) 10 % nebulizer solution 4 mL    acetylcysteine (MUCOMYST) 20 % nebulizer solution 2 mL    albuterol (PROVENTIL) neb solution 2.5 mg    azaTHIOprine (IMURAN) " tablet 50 mg    azithromycin (ZITHROMAX) tablet 250 mg    benzocaine-menthol (CHLORASEPTIC MAX) 15-10 MG lozenge 1 lozenge    benzonatate (TESSALON) capsule 100 mg    budesonide (PULMICORT) neb solution 0.5 mg    [Held by provider] calcium acetate (PHOSLO) capsule 667 mg    fluticasone-vilanterol (BREO ELLIPTA) 100-25 MCG/ACT inhaler 1 puff    guaiFENesin (ROBITUSSIN) 20 mg/mL solution 200 mg    heparin ANTICOAGULANT injection 5,000 Units    hydrocortisone 2.5 % ointment    HYDROmorphone (DILAUDID) injection 0.2 mg    HYDROmorphone (DILAUDID) injection 0.2 mg    hydrOXYzine (ATARAX) tablet 25 mg    levalbuterol (XOPENEX) neb solution 1.25 mg    melatonin tablet 3 mg    [Held by provider] metoprolol tartrate (LOPRESSOR) tablet 25 mg    midodrine (PROAMATINE) tablet 10 mg    montelukast (SINGULAIR) tablet 10 mg    multivitamin RENAL (RENAVITE RX/NEPHROVITE) tablet 1 tablet    naloxone (NARCAN) injection 0.2 mg    Or    naloxone (NARCAN) injection 0.4 mg    Or    naloxone (NARCAN) injection 0.2 mg    Or    naloxone (NARCAN) injection 0.4 mg    ondansetron (ZOFRAN ODT) ODT tab 4 mg    Or    ondansetron (ZOFRAN) injection 4 mg    pantoprazole (PROTONIX) EC tablet 40 mg    piperacillin-tazobactam (ZOSYN) 2.25 g vial to attach to  ml bag    posaconazole (NOXAFIL) EC tablet TBEC 300 mg    [START ON 10/31/2023] predniSONE (DELTASONE) tablet 15 mg    Followed by    [START ON 11/3/2023] predniSONE (DELTASONE) tablet 10 mg    Followed by    [START ON 11/6/2023] predniSONE (DELTASONE) tablet 5 mg    prochlorperazine (COMPAZINE) injection 10 mg    Or    prochlorperazine (COMPAZINE) tablet 10 mg    Or    prochlorperazine (COMPAZINE) suppository 25 mg    sulfamethoxazole-trimethoprim (BACTRIM) 400-80 MG per tablet 1 tablet    [START ON 10/31/2023] tacrolimus (GENERIC) capsule 0.5 mg    triamcinolone (KENALOG) 0.1 % ointment    valGANciclovir (VALCYTE) tablet 450 mg    Vitamin D3 (CHOLECALCIFEROL) tablet 50 mcg        Review  of Systems:    See HPI    Physical exam:  /67 (BP Location: Right arm)   Pulse 82   Temp 98.6  F (37  C) (Oral)   Resp 18   Wt 66.8 kg (147 lb 4.3 oz)   LMP 06/07/2014 (Exact Date)   SpO2 100%   BMI 26.09 kg/m    GEN:This is a well developed, well-nourished female in no acute distress, in a pleasant mood.  Appears tired.  SKIN: Focused examination of the face arms, legs was performed.  -Elkins skin type: II  -On the bilateral dorsal forearms and upper arms there is thick, white desquamating scale with minimal underlying erythema  - Some swelling of the left upper extremity when compared to the right upper extremity  - On the cheeks and forehead there are white scaly plaques with minimal underlying erythema  - Bilateral fingertips with intervening white scaly plaques and eroded papules                                  Laboratory:  Results for orders placed or performed during the hospital encounter of 10/25/23 (from the past 24 hour(s))   Comprehensive metabolic panel   Result Value Ref Range    Sodium 137 135 - 145 mmol/L    Potassium 3.4 3.4 - 5.3 mmol/L    Carbon Dioxide (CO2) 23 22 - 29 mmol/L    Anion Gap 13 7 - 15 mmol/L    Urea Nitrogen 26.4 (H) 8.0 - 23.0 mg/dL    Creatinine 3.44 (H) 0.51 - 0.95 mg/dL    GFR Estimate 15 (L) >60 mL/min/1.73m2    Calcium 8.9 8.8 - 10.2 mg/dL    Chloride 101 98 - 107 mmol/L    Glucose 116 (H) 70 - 99 mg/dL    Alkaline Phosphatase 114 (H) 35 - 104 U/L    AST 12 0 - 45 U/L    ALT 20 0 - 50 U/L    Protein Total 6.0 (L) 6.4 - 8.3 g/dL    Albumin 3.1 (L) 3.5 - 5.2 g/dL    Bilirubin Total 0.7 <=1.2 mg/dL   Phosphorus   Result Value Ref Range    Phosphorus 4.0 2.5 - 4.5 mg/dL   Tacrolimus by Tandem Mass Spectrometry   Result Value Ref Range    Tacrolimus by Tandem Mass Spectrometry 13.2 5.0 - 15.0 ug/L    Tacrolimus Last Dose Date      Tacrolimus Last Dose Time      Narrative    This test was developed and its performance characteristics determined by the [place]  [facility],  Special Chemistry Laboratory. It has not been cleared or approved by the FDA. The laboratory is regulated under CLIA as qualified to perform high-complexity testing. This test is used for clinical purposes. It should not be regarded as investigational or for research.   CBC with platelets   Result Value Ref Range    WBC Count 5.0 4.0 - 11.0 10e3/uL    RBC Count 2.64 (L) 3.80 - 5.20 10e6/uL    Hemoglobin 8.7 (L) 11.7 - 15.7 g/dL    Hematocrit 27.1 (L) 35.0 - 47.0 %     (H) 78 - 100 fL    MCH 33.0 26.5 - 33.0 pg    MCHC 32.1 31.5 - 36.5 g/dL    RDW 17.3 (H) 10.0 - 15.0 %    Platelet Count 192 150 - 450 10e3/uL   EKG 12-lead, complete   Result Value Ref Range    Systolic Blood Pressure  mmHg    Diastolic Blood Pressure  mmHg    Ventricular Rate 93 BPM    Atrial Rate 93 BPM    SD Interval 160 ms    QRS Duration 120 ms     ms    QTc 455 ms    P Axis 53 degrees    R AXIS -6 degrees    T Axis -37 degrees    Interpretation ECG       Sinus rhythm with Premature atrial complexes  Cannot rule out Anterior infarct (cited on or before 25-OCT-2023)  Abnormal ECG  When compared with ECG of 26-OCT-2023 21:02,  Premature atrial complexes are now Present  Questionable change in initial forces of Anteroseptal leads     US Upper Extremity Venous Duplex Left    Narrative    EXAMINATION: DOPPLER VENOUS ULTRASOUND OF THE LEFT UPPER EXTREMITY,  10/30/2023 1:58 PM     COMPARISON: Left upper extremity venous duplex 10/20/2023    HISTORY: Ongoing left upper extremity swelling, reevaluate for  possible clot    TECHNIQUE:  Gray-scale evaluation with compression, spectral flow and  color Doppler assessment of the deep venous system of the left upper  extremity.    FINDINGS:  Left: No thrombus is demonstrated in the internal jugular, innominate,  subclavian, and axillary veins. There is normal compressibility of the  basilic and cephalic veins. There is normal compressibility of the  upper and lower  portions of the brachial vein; Please note the middle  portion of the brachial vein was not evaluated due to an overlying  bandage.    There is retrograde flow in the left axillary vein, mid subclavian  vein, and left cephalic vein near its confluence to the subclavian  vein, likely related to dialysis access.      Impression    IMPRESSION:  No evidence of left upper extremity deep venous thrombosis.    I have personally reviewed the examination and initial interpretation  and I agree with the findings.    JAMES LAI MD         SYSTEM ID:  [id number]   Lactic acid whole blood   Result Value Ref Range    Lactic Acid 0.9 0.7 - 2.0 mmol/L   CBC with platelets   Result Value Ref Range    WBC Count 3.1 (L) 4.0 - 11.0 10e3/uL    RBC Count 2.42 (L) 3.80 - 5.20 10e6/uL    Hemoglobin 7.9 (L) 11.7 - 15.7 g/dL    Hematocrit 24.6 (L) 35.0 - 47.0 %     (H) 78 - 100 fL    MCH 32.6 26.5 - 33.0 pg    MCHC 32.1 31.5 - 36.5 g/dL    RDW 17.2 (H) 10.0 - 15.0 %    Platelet Count 152 150 - 450 10e3/uL   Basic metabolic panel   Result Value Ref Range    Sodium 136 135 - 145 mmol/L    Potassium 3.4 3.4 - 5.3 mmol/L    Chloride 99 98 - 107 mmol/L    Carbon Dioxide (CO2) 25 22 - 29 mmol/L    Anion Gap 12 7 - 15 mmol/L    Urea Nitrogen 11.0 8.0 - 23.0 mg/dL    Creatinine 1.94 (H) 0.51 - 0.95 mg/dL    GFR Estimate 29 (L) >60 mL/min/1.73m2    Calcium 7.9 (L) 8.8 - 10.2 mg/dL    Glucose 134 (H) 70 - 99 mg/dL   XR Abdomen 1 View    Narrative    EXAMINATION:  XR ABDOMEN 1 VIEW 10/30/2023 6:04 PM     COMPARISON: CT abdomen and pelvis 10/28/2023    HISTORY: acute abdominal pain    TECHNIQUE: Supine frontal views of the abdomen.    FINDINGS: Air-filled, nondilated loops of small and large bowel.  Vascular calcifications visualized within the pelvis. Partially  visualized sternotomy wires appear intact..  No free air, pneumatosis  or portal venous gas.       Impression    IMPRESSION: Nonobstructive bowel gas pattern.    I have personally  reviewed the examination and initial interpretation  and I agree with the findings.    ANA CRISOSTOMO DO         SYSTEM ID:  [id number]   XR Chest 1 View    Narrative    EXAM: XR CHEST 1 VIEW 10/30/2023 6:04 PM      HISTORY: interval follow up, lung transplant.    COMPARISON: CT PE and chest radiograph 10/27/2023.     TECHNIQUE: Frontal view of the chest.    FINDINGS:   Median sternotomy wires appear intact. Postoperative clips scattered  throughout the mediastinum. Perihilar and basilar predominant mixed  airspace and interstitial opacities are not significantly changed.  Bilateral costophrenic angles are blunted. Cardiac silhouette stable.  No appreciable pneumothorax.      Impression    IMPRESSION:   1. Persistent perihilar bibasilar mixed interstitial airspace  opacities, not significantly changed compared to 10/27/2023.  2. Small bilateral pleural effusions.    I have personally reviewed the examination and initial interpretation  and I agree with the findings.    ANA CRISOSTOMO DO         SYSTEM ID:  [id number]     *Note: Due to a large number of results and/or encounters for the requested time period, some results have not been displayed. A complete set of results can be found in Results Review.       Staff Involved:  Resident/Staff

## 2023-10-31 NOTE — PLAN OF CARE
Goal Outcome Evaluation:      Plan of Care Reviewed With: patient, spouse    Overall Patient Progress: no changeOverall Patient Progress: no change    A&Ox4, VSS on 2L of O2. C/o new L abdomen pain. MD paged. Dilaudid x1 was administered with relief. LA was 0.9. HD this morning. Renal diet. SBA to commode, no BM this shift. Still with +3 edema in LUE. R PIV TKO in between antibiotics. Will continue with POC.

## 2023-10-31 NOTE — CARE PLAN
LymphedemaTherapy: Orders received. Chart reviewed and discussed with care team.? Met w/ patient who presents w/ LUE swelling, though reports is improving. 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 Offered compression for distal LUE, which pt declined. Therapist educated pt in elevation on x2 pillows and performing light exercise to conservatively manage LUE edema. Issued blue foam block and exercise hand out. Issued x3 sets soft cotton gloves to protect skin on Álvaro hands and promote IND w/ ADLs. Futher Lymphedema Therapy not indicated due to pt declining.? Defer discharge recommendations to medical team.? Will complete orders.

## 2023-10-31 NOTE — PROGRESS NOTES
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 Glasgow. Run time: 3.5 hrs. EDW 59.7 kg. Access: L AVF.  - Dialysis per MWF schedule, plan on additional run today for volume optimization post MRI with contrast       # pAfib: on BB, does not appear to be on AC    # BP/Volume: EDW 59.7 kg, pt maintains this very well, however currently volume up from volume resuscitation and inability to remove fluid on HD. PTA metoprolol 25 mg bid, on occasion pt requires prn midodrine however generally her BP's are in 150-170's. Minimal UOP  - CXR with pulm edema, O2 98% on 2L, appears hypervolemic with 2-3+ edema of R arm and leg  - UF 2-3 kg today, stable  - ordered prn midodrine 10 mg  - pre HD wt today 63.9 kg    # L upper and lower extremity > R upper and lower extremity:  - US ruled out DVT  - not related to fistula as we are getting good flows and no issues  - further workup underway by primary team including MRI today    # Anemia of CKD: hgb dropping, will restart TASH. PTA on Mirerca 75mcg [id number] (last dose 9/27, due for next dose); venofer 50 mg qweek.  -start epogen 6000 units per HD  - hold iron in setting of infection    # BMD: Ca 8's, phos 3.1, alb 3.1; PTA on PO calcitriol 0.75 mcg MWF, Phoslo 2 tabs tid WM  - Phoslo is held    # ID: on zosyn, azithromycin, posaconazole  - having bronch tomorrow        Recommendations were communicated to primary team via this note      MERRICK Coronado   Division of Renal Disease and Hypertension  P [phone]  [phone]    Interval History :   Seen bedside, will dialyze again today for volume off as well as clearance after MRI with contrast. She denies current n/v, CP, chills. Is quite wheezy at baseline      Review of Systems:   4 point ROS neg other than as noted above    Physical Exam:   I/O last 3 completed shifts:  In: 0   Out: 3 [Other:3]   /73 (BP Location: Right arm, Patient Position: Semi-Cobian's, Cuff Size: Adult Regular)   Pulse 100   Temp 98.4  F (36.9  C) (Oral)   Resp 16   Wt 63.9 kg (140 lb 14.4 oz)   LMP 06/07/2014 (Exact Date)   SpO2 100%   BMI 24.96 kg/m       GENERAL APPEARANCE: alert, NAD  EYES: no scleral icterus, pupils equal  Pulmonary: course  CV: afib   - Edema 2-3+ L upper and lower extremities  GI: soft, non-tender   MS: no evidence of inflammation in joints, no muscle tenderness  : no rivas  NEURO: face symmetric, a/o3  Access: L AVF    Labs:   All labs reviewed by me  Electrolytes/Renal -   Recent Labs   Lab Test 10/31/23  0606 10/30/23  1706 10/30/23  0652 10/29/23  0642 10/27/23  0701 10/26/23  2132 10/25/23  1356 10/24/23  1033 10/17/23  1035 10/17/23  1011    136 137 135   < >  --    < > 138   < > 139   POTASSIUM 3.6 3.4 3.4 3.4   < >  --    < > 3.7   < > 4.4   CHLORIDE 101 99 101 99   < >  --    < > 96*  --  99   CO2 26 25 23 23   < >  --    < > 31*  --  30*   BUN 21.2 11.0 26.4* 15.7   < >  --    < > 14.6  --  22.1   CR 2.60* 1.94* 3.44* 2.51*   < >  --    < > 3.43*  --  3.55*   * 134* 116* 136*   < >  --    < > 120*   < > 85   CHELSEY 8.2* 7.9* 8.9 8.5*   < >  --    < > 9.0  --  8.9   MAG  --   --   --   --   --  1.5*  --  1.9  --  1.9   PHOS 3.1  --  4.0 3.6   < >  --    < >  --   --   --     < > = values in this interval not displayed.       CBC -   Recent Labs   Lab Test 10/31/23  0606 10/30/23  1706 10/30/23  0652   WBC 2.1*  2.1* 3.1* 5.0   HGB 7.6* 7.9* 8.7*   * 152 192       LFTs -   Recent Labs   Lab Test 10/31/23  0606 10/30/23  0652 10/29/23  0642    ALKPHOS 124* 114* 114*   BILITOTAL 0.6 0.7 0.8   ALT 19 20 15   AST 12 12 12   PROTTOTAL 5.3* 6.0* 5.8*   ALBUMIN 2.9* 3.1* 3.0*       Iron Panel -   Recent Labs   Lab Test 02/03/21  0415 12/13/18  1033 08/01/18  0921   IRON 51 16* 93   IRONSAT 36 7* 37   YOLA  --  302* 571*         Imaging:  Reviewed    Current Medications:   acetylcysteine  2 mL Nebulization TID    azaTHIOprine  50 mg Oral QPM    azithromycin  250 mg Oral Daily    [Held by provider] calcium acetate  667 mg Oral TID w/meals    fluticasone-vilanterol  1 puff Inhalation Daily    heparin ANTICOAGULANT  5,000 Units Subcutaneous Q12H    hydrocortisone   Topical BID    HYDROmorphone  0.2 mg Intravenous Once    levalbuterol  1.25 mg Nebulization 3 times daily    [Held by provider] metoprolol tartrate  25 mg Oral or Feeding Tube BID    midodrine  10 mg Oral TID w/meals    montelukast  10 mg Oral At Bedtime    multivitamin RENAL  1 tablet Oral Daily    - MEDICATION INSTRUCTIONS -   Does not apply Once    pantoprazole  40 mg Oral QAM AC    piperacillin-tazobactam  2.25 g Intravenous Q6H    posaconazole  300 mg Oral Daily    predniSONE  15 mg Oral QAM    Followed by    [START ON 11/3/2023] predniSONE  10 mg Oral QAM    Followed by    [START ON 11/6/2023] predniSONE  5 mg Oral QAM    sodium chloride 0.9%  250 mL Intravenous Once in dialysis/CRRT    sodium chloride 0.9%  300 mL Hemodialysis Machine Once    sulfamethoxazole-trimethoprim  1 tablet Oral Once per day on Monday Wednesday Friday    tacrolimus  0.5 mg Oral BID IS    triamcinolone   Topical BID    valGANciclovir  450 mg Oral Daily    Vitamin D3  50 mcg Oral Daily      - MEDICATION INSTRUCTIONS -       MERRICK Coronado

## 2023-11-01 NOTE — PROGRESS NOTES
[facility]  Transplant Infectious Disease Progress Note     Patient:  Kecia Blue, Date of birth 1960, Medical record number [id number]  Date of Visit:  11/01/2023         Assessment and Recommendations:   Recommendations:  1. Ok to transition to amox/clav with plans to complete a total of 10 days of antibiotics for post-obstructive pneumonia (10/25-11/3)  2. Given her leukopenia, would decrease valganciclovir to 450 mg PO on HD days. Remember to give this post-HD.   3. Agree with giving one-time dose of G-CSF today, given neutropenia  4. Continue posaconazole, TMP-SMX, and azithromycin for prophylaxis.   4. Draw daily CBC w diff while leukopenic to trend ANC.    I spent >50 minutes on direct patient care or coordinating patient care activities.     Lissett Lewis MD  Pager: [phone]    Assessment:  59F with history of ILD, seronative RA, dermatomyositis s/p bilateral lung transplant 3/2018 with post transplant course c/b left aspergilus empyema (2019, on posaconazole), low-grade CMV viremia, ESRD on HD, and right mainstem bronchial stenosis requiring serial dilation (last 10/17/2023), hx of congestive hepatopathy improving with dialysis, here with slowly progressive productive cough for 2 months, progressive pulmonary infiltrates, increasing O2 requirements, diffuse erythematous skin rash for further evaluation    #Cough, improving  #Multifocal progressive pulmonary consolidations  #BAL with 1+ Actinomyces, normal alo  Her story of needing frequent dilations and even with worsening progression after dilation are suggestive of post-obstructive pneumonia, probably from oral alo, as signalled by Actinomyces. Would not consider this pulmonary actinomyces at this time, but rather use that as a surrogate of significant oral contamination that has caused her slowly progressive symptoms. Zosyn is an excellent drug for this syndrome and she already has had signficant  "improvement since starting Zosyn on 10/25 and rest of work-up has been unrevealing. She underwent repeat bronchoscopy with dilation on 11/1 without complication. Ongoing discussions regarding need for stenting with a custom stent in the future.     #Erythrodermic skin rash, diffuse, pruritus, after receiving vancomycin + pip-tazo  Responded well to diphenhydramine, so it seemed consistent with vanco-infusion syndrome, but given her history of autoimmune disease (dermatomyositis), and the fact the rash persisted for quite awhile, dermatology is involved who feel it may resemble recurrence of autoimmune disease and are considering skin biopsy. Updated allergy list. Her rash has continued to improve while still on pip-tazo.    #CMV viremia  #Leukopenia  Low-grade viremia since 8/2023. She had a level up to log 521 (log 2.7) on 9/7/2023, though was not started on VGCV (three times weekly after HD) until approx 10/6/2023. She remains low-level viremic on 10/17 and 10/24 (log 1.9). She was undetectable throughout rest of 2022 and 2023. She reports taking VGCV in the past for \"cold sores\" for short courses, though I wonder if she means valacyclovir. Either way, she may be at risk for resistance. Increased dose to daily and she is now progressively leukopenic; ANC 1.4 on 10/31 and 1.0 on 11/1, so would transition back to three-times-per week dosing.     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 BAL showed septate hyphae. 7/2020 had hypodense mass in left lung with biopsy showing fungal hyphae, cx aspergillus. Started on voriconazole in 2019, changed to posaconazole in 2020, she remains on 300 mg daily.  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 Recovery from this required tracheostomy.  2019 - She had also grown Actinomyces from multiple BAL    - QTc " interval: 420 msec (10/25/2023)   - Bacterial prophylaxis: Azithromycin  - Pneumocystis prophylaxis: TMP-SMX for life (hx PJP)  - Serostatus & viral prophylaxis: CMV D+/R+, EBV D+/R+  - Fungal prophylaxis: posaconazole   - Immunization status: Seasonal influenza, RSV, and COVID vaccine  - Gamma globulin status: 979 10/27/2023  - Isolation status: Hx MRSA - contact         Summary of presentation:   59F with history of ILD, seronative RA, dermatomyositis s/p bilateral lung transplant 3/2018 with post transplant course c/b left aspergilus empyema (2019, on posaconazole), CMV viremia, ESRD on HD, and right mainstem bronchial stenosis requiring serial dilation (last 10/17/2023), hx of congestive hepatopathy improving with dialysis, here with slowly progressive productive cough for 2 months, progressive pulmonary infiltrates, increasing O2 requirements, diffuse erythematous skin rash for further evaluation          Interval:   - Underwent bronchoscopy with dilation today without issue. No other acute complaints noted. Did undergo MR Chest due to the swelling in her LUE. This was remarkable for skin thickening of the L breast and edea in the L breast tissue.     Transplants:  3/1/2018 (Lung), Postoperative day:  2071.  Coordinator Kacy Martínez           Current Medications & Allergies:      acetylcysteine  2 mL Nebulization TID    [START ON 11/3/2023] amoxicillin-clavulanate  1 tablet Oral Q24H    [START ON 11/2/2023] amoxicillin-clavulanate  1 tablet Oral Once    [Held by provider] azaTHIOprine  50 mg Oral QPM    azithromycin  250 mg Oral Daily    [Held by provider] calcium acetate  667 mg Oral TID w/meals    epoetin alisha-epbx  6,000 Units Intravenous Once in dialysis/CRRT    fluticasone-vilanterol  1 puff Inhalation Daily    heparin ANTICOAGULANT  5,000 Units Subcutaneous Q12H    hydrocortisone   Topical BID    HYDROmorphone  0.2 mg Intravenous Once    levalbuterol  1.25 mg Nebulization 3 times daily     [Held by provider] metoprolol tartrate  25 mg Oral or Feeding Tube BID    midodrine  10 mg Oral TID w/meals    montelukast  10 mg Oral At Bedtime    multivitamin RENAL  1 tablet Oral Daily    - MEDICATION INSTRUCTIONS -   Does not apply Once    pantoprazole  40 mg Oral QAM AC    piperacillin-tazobactam  2.25 g Intravenous Q6H    posaconazole  300 mg Oral Daily    predniSONE  15 mg Oral QAM    Followed by    [START ON 11/3/2023] predniSONE  10 mg Oral QAM    Followed by    [START ON 11/6/2023] predniSONE  5 mg Oral QAM    sodium chloride 0.9%  250 mL Intravenous Once in dialysis/CRRT    sodium chloride 0.9%  300 mL Hemodialysis Machine Once    sulfamethoxazole-trimethoprim  1 tablet Oral Once per day on Monday Wednesday Friday    tacrolimus  0.5 mg Oral BID IS    triamcinolone   Topical BID    valGANciclovir  450 mg Oral Daily    Vitamin D3  50 mcg Oral Daily            Physical Exam:     Patient Vitals for the past 24 hrs:   BP Temp Temp src Pulse Resp SpO2 Weight   11/01/23 1000 112/69 -- -- 89 -- 97 % --   11/01/23 0945 120/85 -- -- 84 21 97 % --   11/01/23 0940 114/77 -- -- 115 24 96 % --   11/01/23 0935 -- -- -- 87 28 100 % --   11/01/23 0929 133/86 -- -- 94 17 98 % --   11/01/23 0927 133/86 -- -- 104 (!) 31 96 % --   11/01/23 0925 133/86 -- -- (!) 131 20 96 % --   11/01/23 0920 137/77 -- -- 97 24 100 % --   11/01/23 0917 138/71 -- -- 99 (!) 40 (!) 89 % --   11/01/23 0915 138/71 -- -- 93 (!) 31 (!) 89 % --   11/01/23 0910 (!) 152/88 -- -- 96 20 100 % --   11/01/23 0905 (!) 150/72 -- -- 105 25 (!) 82 % --   11/01/23 0900 (!) 143/72 -- -- 118 (!) 33 100 % --   11/01/23 0856 (!) 141/73 -- -- 85 21 100 % --   11/01/23 0850 -- -- -- -- -- -- 61.8 kg (136 lb 3.2 oz)   11/01/23 0547 131/79 97.9  F (36.6  C) Oral 80 20 100 % --   10/31/23 2128 113/66 97.4  F (36.3  C) Oral 95 19 100 % --   10/31/23 1725 128/66 -- -- 112 -- 99 % --   10/31/23 1720 121/67 98.1  F (36.7  C) Oral 90 -- 100 % --   10/31/23 1700 116/66 --  "-- 88 -- 100 % --   10/31/23 1645 121/70 -- -- 86 -- 100 % --   10/31/23 1630 117/73 -- -- 87 -- 100 % --   10/31/23 1615 124/68 -- -- 82 -- 100 % --   10/31/23 1600 130/77 -- -- 80 -- 100 % --   10/31/23 1545 136/81 -- -- 90 -- 100 % --   10/31/23 1530 (!) 141/73 -- -- 82 -- 100 % --   10/31/23 1519 (!) 148/74 -- -- 89 -- 99 % --   10/31/23 1510 137/82 97.8  F (36.6  C) Oral 93 -- 100 % --   10/31/23 1450 130/76 97.8  F (36.6  C) Oral 98 22 -- --   10/31/23 1119 -- -- -- -- -- -- 63.9 kg (140 lb 14.4 oz)     Ranges for vital signs:  Temp:  [97.4  F (36.3  C)-98.1  F (36.7  C)] 97.9  F (36.6  C)  Pulse:  [] 89  Resp:  [17-40] 21  BP: (112-152)/(66-88) 112/69  SpO2:  [82 %-100 %] 97 %  Vitals:    10/30/23 0800 10/31/23 1119 11/01/23 0850   Weight: 66.8 kg (147 lb 4.3 oz) 63.9 kg (140 lb 14.4 oz) 61.8 kg (136 lb 3.2 oz)       Physical Examination:  GENERAL:  Lying in bed and undergoing HD today.  HEAD:  Head is normocephalic, atraumatic   EYES:  Eyes have anicteric sclerae without conjunctival injection   ENT:  No oral ulcers noted.   NECK:  Supple.   LUNGS:  Clear throughout bilaterally   CARDIOVASCULAR:  Regular rate and rhythm with no murmurs, gallops or rubs.  ABDOMEN:  Normal bowel sounds, soft, nontender.   SKIN:  Diffuse warm erythematous rash that continues to exfoliate.  Line in place without any surrounding erythema or exudate. Bandage over L AVF.  NEUROLOGIC:  Grossly nonfocal. Active x4 extremities         Laboratory Data:     No results found for: \"ACD4\", \"HIVPCR\"    Inflammatory Markers    Recent Labs   Lab Test 10/29/23  0642 10/28/23  1737 10/28/23  0725 10/27/23  0701 10/27/19  1147 10/07/19  1221 10/06/19  1444 09/13/19  0934 08/22/19  0820 05/16/18  1006   SED 66*  --  58*  --   --  93*  --  111*   < >  --    CRP  --   --   --   --   --   --  25.0* 58.0*   < >  --    CRPI  --   --  139.00*  --   --   --   --   --   --   --    G6PD  --   --   --   --  19.0*  --   --   --   --   --    RHF  --   " --   --  22*  --   --   --   --   --   --    [id number]  --  76*  --   --   --   --   --   --   --  113   I9FXGIM  --   --   --  15  --   --   --   --   --   --     < > = values in this interval not displayed.       Immune Globulin Studies     Recent Labs   Lab Test 10/27/23  0701 10/24/23  1033 09/15/21  0915 01/31/21  0441 01/25/21  0406 10/27/19  0621 03/01/18  0356 02/19/18  0759     --  1,198 763  --  936 698 1,790*   IGM  --   --   --   --   --   --   --  502*   IGE  --  <2  --   --  <2  --   --  <2   IGA  --   --   --   --   --   --   --  425*   IGG1  --   --   --   --   --   --   --  1,300*   IGG2  --   --   --   --   --   --   --  131*   IGG3  --   --   --   --   --   --   --  101   IGG4  --   --   --   --   --   --   --  1*       Metabolic Studies       Recent Labs   Lab Test 11/01/23  0722 10/31/23  0606 10/30/23  1706 10/28/23  1641 10/28/23  0914 10/28/23  0725 10/27/23  0701 10/26/23  0054 10/25/23  1356 06/27/22  1013 05/26/22  0750 10/22/19  1314 10/21/19  1752    137 136   < >  --    < > 133*   < > 133*   < > 137   < > 133   POTASSIUM 3.2* 3.6 3.4   < >  --    < > 3.5   < > 4.4   < > 3.5   < > 5.0   CHLORIDE 101 101 99   < >  --    < > 95*   < > 96*   < > 96   < > 109   CO2 26 26 25   < >  --    < > 26   < > 26   < > 32   < > 10*   ANIONGAP 12 10 12   < >  --    < > 12   < > 11   < > 9   < > 13   BUN 19.0 21.2 11.0   < >  --    < > 16.0   < > 24.6*   < > 42*   < > 66*   CR 2.72* 2.60* 1.94*   < >  --    < > 3.00*   < > 4.90*   < > 3.98*   < > 5.76*   GFRESTIMATED 19* 20* 29*   < >  --    < > 17*   < > 10*   < > 12*   < > 8*   GLC 90 130* 134*   < >  --    < > 100*   < > 92   < > 110*   < > 138*   A1C  --   --   --   --   --   --   --   --   --   --  5.2   < >  --    CHELSEY 8.6* 8.2* 7.9*   < >  --    < > 8.9   < > 8.3*   < > 9.5   < > 8.0*   PHOS 2.6 3.1  --    < >  --    < > 2.3*   < >  --   --   --    < > 6.7*   MAG 1.5*  --   --   --   --   --   --    < >  --    < > 1.8   < > 2.0   LACT   --   --  0.9  --  0.7  --   --    < > 1.1   < >  --    < >  --    PCAL  --   --   --   --   --   --   --   --  0.70*  --   --    < >  --    FGTL  --   --   --   --   --   --  35  --   --   --   --    < >  --    CKT  --   --   --   --   --   --  69  --   --   --   --   --  70    < > = values in this interval not displayed.       Hepatic Studies    Recent Labs   Lab Test 11/01/23  0722 10/31/23  0606 10/25/23  1356 10/24/23  1033 07/11/23  0952 05/26/23  0828 05/27/21  1205 05/01/21  0654 01/27/21  0414 01/26/21  0425 01/24/21  1458 11/19/19  1453 11/11/19  1131   BILITOTAL 0.7 0.6   < > 0.9   < >  --    < >  --    < > 0.8 1.2   < > 0.4   81841  --   --   --   --   --  0.5   < >  --    < >  --   --    < >  --    DBIL  --   --   --  0.50*   < >  --    < >  --    < > 0.6*  --    < >  --    ALKPHOS 131* 124*   < > 306*   < >  --    < >  --    < > 184* 244*   < > 316*   77981  --   --   --   --   --  259*   < >  --    < >  --   --    < >  --    PROTTOTAL 5.8* 5.3*   < > 7.4   < >  --    < > 7.2   < > 6.0*  6.0* 6.1*   < > 7.2   24723  --   --   --   --   --  7.1   < >  --    < >  --   --    < >  --    ALBUMIN 3.0* 2.9*   < > 3.6   < >  --    < >  --    < > 2.0* 2.0*   < > 3.0*   51725  --   --   --   --   --  3.2*   < >  --    < >  --   --    < >  --    AST 14 12   < > 39   < >  --    < >  --    < > 11 31   < > 30   88011  --   --   --   --   --  31   < >  --    < >  --   --    < >  --    ALT 19 19   < > 31   < >  --    < >  --    < > 30 35   < > 27   68961  --   --   --   --   --  48   < >  --    < >  --   --    < >  --    LDH  --   --   --   --   --   --   --  164  --  187  --   --   --    GGT  --   --   --   --   --   --   --   --   --   --   --   --  510*   SALAS  --   --   --   --   --   --   --   --   --   --  <10*  --   --     < > = values in this interval not displayed.       Pancreatitis testing    Recent Labs   Lab Test 10/25/23  1356 05/26/22  0750 12/31/19  1033 10/24/19  2032 10/21/19  1451 10/06/19  1444  03/04/18  0353 02/19/18  0759   AMYLASE  --   --   --   --   --   --   --  58   LIPASE 24  --   --  212 119 172   < >  --    TRIG  --  155*   < >  --   --   --    < > 115    < > = values in this interval not displayed.       Gout Labs    No lab results found.    Hematology Studies   Recent Labs   Lab Test 11/01/23  0722 10/31/23  0606 10/30/23  1706 10/30/23  0652 10/29/23  0642 10/28/23  0725 10/27/23  0701 10/17/23  1011 10/11/23  0806   WBC 1.6*  1.6* 2.1*  2.1* 3.1* 5.0 5.2   < > 7.6   < >  --    97286  --   --   --   --   --   --   --   --  3.7*  3.7*   ANEU 1.0*  --   --   --  5.0   < >  --   --   --    ANEUTAUTO  --  1.4*  --   --   --   --  7.2   < >  --    ALYM 0.4*  --   --   --  0.1*   < >  --   --   --    ALYMPAUTO  --  0.4*  --   --   --   --  0.1*   < >  --    SHERRI 0.2  --   --   --  0.1   < >  --   --   --    AMONOAUTO  --  0.3  --   --   --   --  0.1   < >  --    AEOS 0.1  --   --   --  0.0   < >  --   --   --    AEOSAUTO  --  0.1  --   --   --   --  0.1   < >  --    ABSBASO  --  0.0  --   --   --   --  0.0   < >  --    HGB 8.0* 7.6* 7.9* 8.7* 8.2*   < > 9.3*   < >  --    61237  --   --   --   --   --   --   --   --  11.4*  11.4*   HCT 25.3* 23.8* 24.6* 27.1* 26.0*   < > 29.4*   < >  --    * 138* 152 192 133*   < > 149*   < >  --    68772  --   --   --   --   --   --   --   --  167  167    < > = values in this interval not displayed.       Clotting Studies    Recent Labs   Lab Test 10/24/23  1033 08/16/23  0827 05/26/22  0750 09/15/21  0915 02/19/21  0458 02/12/21  0315   INR 0.99 0.9  0.9 0.96 0.96   < >  --    PTT  --   --   --   --   --  28    < > = values in this interval not displayed.       Iron Testing    Recent Labs   Lab Test 11/01/23  0722 02/12/21  0315 02/11/21  0645 02/04/21  0310 02/03/21  0415 12/14/18  0951 12/13/18  1033 09/04/18  1052 08/20/18  0553 08/02/18  1100 08/01/18  0921   IRON  --   --   --   --  51  --  16*  --   --   --  93   FEB  --   --   --   --  143*  --   "221*  --   --   --  248   IRONSAT  --   --   --   --  36  --  7*  --   --   --  37   YOLA  --   --   --   --   --   --  302*  --   --   --  571*   *   < > 92   < > 91   < > 107*   < >  --    < > 101*   FOLIC  --   --   --   --   --   --   --   --  43.4  --   --    B12  --   --   --   --   --   --   --   --   --   --  1,753*   HAPT  --   --   --   --   --   --  296*  --   --   --   --    RETP  --   --  2.9*  --   --    < > 4.6*  --   --    < >  --    RETICABSCT  --   --  72.4  --   --    < > 94.2  --   --    < >  --     < > = values in this interval not displayed.       Markers  No lab results found.    Invalid input(s): \"FETOPROTEIN\", \"SERUM\", \"AFP\"    Autoimmune Testing    Recent Labs   Lab Test 10/28/23  1737 10/28/23  0725 10/27/23  0701 05/16/18  1006 02/22/18  1800   RHF  --   --  22*  --   --    DNA  --  1.0  --   --   --    B2PBTYE 76*  --   --  113  --    J0WMOSJ  --   --  15  --   --    RNPIGG Negative Negative  --  0.2  --    SMIGG Negative  --   --  <0.2  --    SSAIGG Negative  --   --  2.8*  --    SSBIGG Negative  --   --  7.3*  --    SCLIGG  --  Negative  --  <0.2 <0.2       Arterial Blood Gas Testing    Recent Labs   Lab Test 10/17/23  1035 02/10/21  2000 02/10/21  1555 02/09/21  1225 02/09/21  0403 02/08/21  1200   PH  --  7.41 7.36 7.38 7.42 7.40   PCO2  --  42 46* 49* 44 47*   PO2  --  90 105 74* 116* 72*   HCO3  --  26 26 29* 28 29*   O2PER 21.0 50 50 60 60.0 60        Thyroid Studies     Recent Labs   Lab Test 07/11/23  0952 08/01/18  0921 02/19/18  0759   TSH 1.23 1.80 3.07       Urine Studies     Recent Labs   Lab Test 01/24/21  1729 10/21/19  2240 09/12/19  0125 08/07/19  1512 03/04/18  1425   URINEPH 5.0 5.0 7.5* 7.0 5.5   NITRITE Negative Negative Negative Negative Negative   LEUKEST Moderate* Large* Negative Trace* Negative   WBCU 34* 115* 3 19* 5   UWBCLM Present*  --   --   --   --        Medication levels    Recent Labs   Lab Test 10/31/23  0606 10/28/23  0725 10/26/23  0606 " "03/31/20  1131 03/19/20  1032   VANCOMYCIN  --   --  18.6   < >  --    VCON  --   --   --   --  1.4   TACROL 8.1   < >  --    < > 10.3    < > = values in this interval not displayed.       CSF testing   No lab results found.    Invalid input(s): \"CADAM\", \"EVPCR\", \"ENTPCR\", \"ENTEROVIRUS\"    Body fluid stats    Recent Labs   Lab Test 08/17/23  1144 08/17/23  1137 02/11/22  1308 02/11/22  1308 04/30/21  1937 04/29/21  1315 02/03/21  0941 01/25/21  1347 01/24/21  1629 02/20/20  0800 10/22/19  1430 01/17/19  1207 12/14/18  1054   FTYP  --   --   --   --  Pleural fluid  --   --  Pleural fluid Bronchoalveolar Lavage   < > Ascites   < > Bronchial washing   FCOL Pink* Colorless  --  Colorless Slightly Bloody  --   --  Yellow Pink   < > Yellow   < > [place]   FAPR Hazy* Hazy*   < > Cloudy* Cloudy  --   --  Cloudy Slightly Cloudy   < > Clear   < > Turbid   FRBC  --   --   --   --   --   --   --   --   --   --   --   --  << Do Not Report >>   FWBC 267 236  --  63 9460  --   --  660 355   < > 44   < > 9520   FNEU 7 2   < >  --   --   --   --  87 81   < > 4   < > 94   FLYM 4 4   < > 12  --   --   --   --  5   < > 49  --  1   FMONO 78 88   < >  --   --   --   --  13  --    < >  --    < > 4   FBAS 1  --   --   --   --   --   --   --   --    < >  --   --   --    FOTH  --   --   --  88 0  --   --   --  11  --  47   < >  --    FALB  --   --   --   --   --   --   --   --   --   --  0.8  --   --    FTP  --   --   --   --  3.3  --   --  3.4  --   --  1.7  --   --    GS  --   --   --   --   --  No organisms seen  Rare  WBC'S seen  predominantly PMN's     < > No organisms seen  Moderate  WBC'S seen  predominantly mononuclear cells   >25 PMNs/low power field  No organisms seen   < > No organisms seen  Few  WBC'S seen  predominantly mononuclear cells    Quantification of host cells and microbiological organisms was done on a cytocentrifuged   preparation.     < > >25 PMNs/low power field  Many  Gram negative diplococci  *  Moderate  Gram " positive cocci  *    < > = values in this interval not displayed.       Microbiology:  Fungal testing  Recent Labs   Lab Test 10/27/23  0701 10/26/23  1318 10/17/23  1105 08/17/23  1144 10/07/19  1544 09/14/19  1625   FGTL 35  --   --   --    < > 122   FGTLI Negative  --   --   --    < > Positive*   PJRDFA  --   --  Not Detected Not Detected   < >  --    ASPGAI  --  0.07  --  0.05   < > 1.08   ASPAG  --   --   --  Negative   < >  --    ASPGAA  --  Negative  --   --    < > Positive*   CIABB  --   --   --   --   --  <1:2    < > = values in this interval not displayed.       Last Culture results   S Pneumoniae Antigen   Date Value Ref Range Status   01/24/2021   Final    Negative, no Streptococcus pneumoniae antigen detected by immunochromatographic membrane   assay. A negative Streptococcus pneumoniae antigen result does not rule out infection with   Streptococcus pneumoniae.       P. jirovecii By PCR   Date Value Ref Range Status   10/17/2023 Not Detected  Final     Comment:     NOT DETECTED - A negative result does not rule out the   presence of PCR inhibitors in the patient specimen or   assay specific nucleic acid in concentrations below the   level of detection by the assay.    This test was developed and its performance characteristics   determined by [facility]. It has not been cleared or   approved by the US Food and Drug Administration. This test   was performed in a CLIA certified laboratory and is   intended for clinical purposes.  Performed By: [facility]  [address] [place] [id number]  : Rogelio Llanos MD, PhD  CLIA Number: [id number]   08/17/2023 Not Detected  Final     Comment:     NOT DETECTED - A negative result does not rule out the   presence of PCR inhibitors in the patient specimen or   assay specific nucleic acid in concentrations below the   level of detection by the assay.    This test was developed and its performance characteristics    determined by [facility]. It has not been cleared or   approved by the US Food and Drug Administration. This test   was performed in a CLIA certified laboratory and is   intended for clinical purposes.  Performed By: [facility]  [address] [place], [place] [id number]  : Rogelio lLanos MD, PhD  CLIA Number: [id number]   08/17/2023 Not Detected  Final     Comment:     NOT DETECTED - A negative result does not rule out the   presence of PCR inhibitors in the patient specimen or   assay specific nucleic acid in concentrations below the   level of detection by the assay.    This test was developed and its performance characteristics   determined by [facility]. It has not been cleared or   approved by the US Food and Drug Administration. This test   was performed in a CLIA certified laboratory and is   intended for clinical purposes.  Performed By: [facility]  [address] [place], [place] [id number]  : Rogelio Llanos MD, PhD  CLIA Number: [id number]   01/24/2021 Detected (A)  Final     Comment:     (Note)  DETECTED - P. jirovecii DNA detected in this specimen.  Results should be used to aid in the diagnosis of PCP  pneumonia and must be interpreted in the context of host  risk factors, clinical presentation, and radiographic  imaging.  TEST INFORMATION: Pneumocystis jirovecii Detection by PCR  Test developed and characteristics determined by [facility]. See Compliance Statement B: [facility]/  Performed by [facility],  [address] [place], [place] [id number] [phone]  [email or web address] Carri Red MD, Lab. Director       Culture   Date Value Ref Range Status   10/26/2023   Final    >10 Squamous epithelial cells/low power field indicates oral contamination. Please recollect.   10/25/2023 No Growth  Final   10/25/2023 No Growth  Final   10/17/2023 1+ Actinomyces species (A)  Final     Comment:     Susceptibilities not routinely  "done, refer to antibiogram to view typical susceptibility profiles  This organism is part of normal alo, but on occasion may be a true pathogen. Clinical correlation must be applied to interpreting this result.   10/17/2023 1+ Normal alo  Final   10/17/2023 No growth after 14 days  Preliminary   10/17/2023 No growth after 14 days  Preliminary   08/17/2023 No Growth  Final   08/17/2023 No Growth  Final   08/17/2023 1+ Normal alo  Final   08/17/2023 1+ Stenotrophomonas maltophilia (A)  Final     Comment:     Susceptibilities done on previous cultures   08/17/2023 No Growth  Final   08/17/2023 No Growth  Final   08/17/2023 1+ Normal alo  Final   08/17/2023 1+ Stenotrophomonas maltophilia (A)  Final   02/11/2022 No Actinomyces isolated  Final   02/11/2022 No Growth  Final   02/11/2022 No Growth  Final   09/23/2021   Final    >10 Squamous epithelial cells/low power field indicates oral contamination. Please recollect.   09/23/2021 No Growth  Final     Culture Micro   Date Value Ref Range Status   05/01/2021   Final    Quantity not sufficient  Called to Maxim Norman at 1236 5/1/21.    mlb     05/01/2021 Culture negative after 30 days  Final   04/29/2021 No anaerobes isolated  Final   04/29/2021 No growth  Final   02/19/2021 Culture negative for acid fast bacilli  Final   02/19/2021   Final    Assayed at [facility] [address] [place] [address] [phone]   02/19/2021 Culture negative after 4 weeks  Final   02/19/2021 No growth after 4 weeks  Final   02/19/2021 Moderate growth  Staphylococcus epidermidis   (A)  Final   02/09/2021 No growth  Final         Last checks of Clostridioides difficile testing  Recent Labs   Lab Test 10/31/23  0732 02/13/21  0530 12/04/18  1500 08/02/18  2253   CDBPCT Negative Negative Negative Positive*       Syphilis Testing    No results found for: \"TREPT\", \"TREPAB\", \"RPR\", \"TPPAT\", \"CVD\"    Quantiferon testing   Recent Labs   Lab Test 11/01/23  0722 10/31/23  0606 " "09/20/21  1903 05/01/21  1102 02/22/21  0527 02/19/21  0853 02/11/21  0645 01/25/21  1347 01/24/21  1629 10/28/19  0553 10/25/19  1628 10/06/19  1444 09/14/19  1625 02/21/18  1439 02/19/18  0759   TBRES  --   --   --   --   --   --   --   --   --   --  Negative  --  Negative  --   --    TBRSLT  --   --   --   --   --   --   --   --   --   --   --   --   --   --  Negative   TBAGN  --   --   --   --   --   --   --   --   --   --   --   --   --   --  0.00   LYMPH 22 18   < >  --    < >  --    < >  --   --    < >  --    < >  --    < > 8.4   AFBSMS  --   --   --  Quantity not sufficient  Called to Dr Maxim Norman at 1236 5/1/21.    mlb    --  Negative for acid fast bacteria  Less than 5ml of specimen received.  A minimum of 5 mL of sputum or fluid is recommended for recovery of acid fast bacilli   (AFB).  Volumes less than 5 mL are suboptimal and may compromise recovery of AFB from   culture.    Assayed at [facility] [address] [place] [address], [place] [address] [phone]  --  Negative for acid fast bacteria  Less than 5ml of specimen received.  A minimum of 5 mL of sputum or fluid is recommended for recovery of acid fast bacilli   (AFB).  Volumes less than 5 mL are suboptimal and may compromise recovery of AFB from   culture.    Assayed at [facility] [address] [place] [address] [phone] Negative for acid fast bacteria  Less than 5ml of specimen received.  A minimum of 5 mL of sputum or fluid is recommended for recovery of acid fast bacilli   (AFB).  Volumes less than 5 mL are suboptimal and may compromise recovery of AFB from   culture.    Assayed at [facility] [address] [place] [address] [phone]   < >  --    < >  --    < >  --     < > = values in this interval not displayed.       Infection Studies to assess Diarrhea  No lab results found.    Invalid input(s): \"BIDYD\"    Virology:  Coronavirus-19 testing    Recent Labs   Lab Test 07/11/23  1132 10/12/22  1528 " 09/26/22  0956 05/23/22  1013 04/01/22  1257 02/07/22  1301 01/31/22  1309 01/24/22  1324 09/20/21  1859 09/15/21  0623 06/07/21  1310 05/02/21  0715 04/26/21  1151 04/08/21  0723   YZV24DR  --   --   --   --   --   --   --   --   --   --   --  Negative  --   --    [id number]  --   --   --   --   --   --   --   --   --   --   --  Not Applicable  --   --    [id number] Negative  --   --   --   --   --   --   --  Negative Negative  --   --   --  Test received-See reflex to IDDL test SARS CoV2 (COVID-19) Virus RT-PCR  NEGATIVE   [id number]  --   --   --   --   --   --   --   --   --   --   --   --   --  Nasopharyngeal   [id number]  --   --   --   --   --   --   --   --   --   --   --   --   --  Nasopharyngeal   COVIDPCREXT  --  Negative Negative Undetected   < > Undetected Undetected   < >  --   --    < >  --   --   --    SOUREXT  --   --   --  Nasopharynx  --  Nasopharynx Nasopharynx   < >  --   --    < >  --    < >  --    [id number]  --   --   --  Undetected  --  Undetected Undetected   < >  --   --    < >  --    < >  --     < > = values in this interval not displayed.       Respiratory virus (non-coronavirus-19) testing    Recent Labs   Lab Test 10/27/23  0449 08/17/23  1144 08/17/23  1137 01/24/21  1822 01/24/21  1629 12/23/20  1102 02/27/18  1016 02/22/18  0900   RVSPEC  --   --   --   --  Bronchial Bronchial   < >  --    AFLU  --   --   --   --   --   --   --  Duplicate request*   IFLUA Not Detected Not Detected Not Detected   < > Negative Negative   < >  --    INFZA  --   --   --   --   --   --   --  Negative   FLUAH1 Not Detected Not Detected Not Detected   < > Negative Negative   < >  --    [id number] Not Detected Not Detected Not Detected   < > Negative Negative   < >  --    FLUAH3 Not Detected Not Detected Not Detected   < > Negative Negative   < >  --    BFLU  --   --   --   --   --   --   --  Duplicate request*   IFLUB Not Detected Not Detected Not Detected   < > Negative Negative   < >  --    INFZB  --   --    --   --   --   --   --  Negative   PIV1 Not Detected Not Detected Not Detected   < > Negative Negative   < >  --    PIV2 Not Detected Not Detected Not Detected   < > Negative Negative   < >  --    PIV3 Not Detected Not Detected Not Detected   < > Negative Negative   < >  --    PIV4 Not Detected Not Detected Not Detected   < >  --   --    < >  --    IRSV  --   --   --   --   --   --   --  Negative   HRVS  --   --   --   --  Negative Negative   < >  --    RSVA Not Detected Not Detected Not Detected   < > Negative Negative   < >  --    RSVB Not Detected Not Detected Not Detected   < > Negative Negative   < >  --    HMPV Not Detected Not Detected Not Detected   < > Negative Negative   < >  --    ADVBE  --   --   --   --  Negative Negative   < >  --    ADVC  --   --   --   --  Negative Negative   < >  --    ADENOV Not Detected Not Detected Not Detected   < >  --   --    < >  --    CORONA Not Detected Not Detected Not Detected   < >  --   --    < >  --     < > = values in this interval not displayed.       CMV viral loads    Recent Labs   Lab Test 10/31/23  0606 10/24/23  1033 10/17/23  1011 10/11/23  0806 10/04/23  0810 09/07/23  0710 08/17/23  1144 08/17/23  1137 08/08/23  0828 08/08/23  0828 07/11/23  0952 05/26/23  0828 04/06/23  0725 03/08/23  0848 02/09/23  1034 11/18/22  0928 09/27/22  1012 06/27/22  1013 05/26/22  0750 03/03/22  1054 02/11/22  1308 02/10/22  0919 11/10/21  1106 10/12/21  1251 03/15/21  0904 02/25/21  0542   CMVQNT <35*  --   --   --   --   --   --   --   --   --  Not Detected  --  Not Detected  --  <137*  --  Not Detected  --  <137*  --  Not Detected Not Detected  --  Not Detected   < > CMV DNA Not Detected   CMVRESINST  --  103*  80* 75*  --   --  521*  --   --   --  46*  --   --   --   --   --   --   --   --   --   --   --   --   --   --   --   --    CSPEC  --   --   --   --   --   --   --   --   --   --   --   --   --   --   --   --   --   --   --   --   --   --   --   --   --  EDTA PLASMA  "  CMVLOG <1.5 2.0  1.9 1.9  --   --  2.7  --   --    < > 1.7  --   --   --   --  <2.1  --   --   --  <2.1   < >  --   --   --   --   --  Not Calculated   38958  --   --   --  2,680*  2,680*   < >  --   --   --   --   --   --    < >  --    < >  --    < >  --    < >  --    < >  --   --    < >  --    < >  --    CMVQAL  --   --   --   --   --   --  Not Detected Not Detected  --   --   --   --   --   --   --   --   --   --   --   --   --   --   --   --   --   --     < > = values in this interval not displayed.       CMV resistance testing  Recent Labs   Lab Test 08/03/18  0624   CMVCID Sensitive   CMVFOS Sensitive   CMVGAN Sensitive       No results found for: \"H6RES\"    EBV DNA Copies/mL   Date Value Ref Range Status   10/28/2023 10,175 (H) <=0 copies/mL Final   08/08/2023 42,461 (H) <=0 copies/mL Final   07/11/2023 49,591 (H) <=0 copies/mL Final   04/06/2023 43,506 (H) <=0 copies/mL Final   03/01/2023 83,207 (H) <=0 copies/mL Final   02/09/2023 114,508 (H) <=0 copies/mL Final   09/27/2022 21,749 (H) <=0 copies/mL Final   05/26/2022 77,030 (H) <=0 copies/mL Final   02/10/2022 135,117 (H) <=0 copies/mL Final   10/12/2021 969,411 (H) <=0 copies/mL Final   09/15/2021 45,122 (H) <=0 copies/mL Final   05/01/2021 38,186 (A) EBVNEG^EBV DNA Not Detected [Copies]/mL Final   02/22/2021 75,709 (A) EBVNEG^EBV DNA Not Detected [Copies]/mL Final   01/25/2021 5,619 (A) EBVNEG^EBV DNA Not Detected [Copies]/mL Final   12/09/2020 10,686 (A) EBVNEG^EBV DNA Not Detected [Copies]/mL Final   09/09/2020 2,935 (A) EBVNEG^EBV DNA Not Detected [Copies]/mL Final   03/09/2020 8,918 (A) EBVNEG^EBV DNA Not Detected [Copies]/mL Final     EBV DNA Quant (External)   Date Value Ref Range Status   10/11/2023 <35 (A) Undetected IU/mL Final   05/26/2023 <35 (A) Undetected IU/mL Final   03/08/2023 <35 (A) undetected IU/ml Final   03/08/2023 <35 (A) Undetected IU/mL Final   01/13/2023 Undetected Undetected IU/mL Final     EBV Interp DNA Quant (External) " "  Date Value Ref Range Status   03/08/2023   Final    EBV DNA is detected, but level present is <35 IU/mL (<1.54 log IU/mL). This assay cannot accurately quantify EBV DNA below this level.       BK Virus Result   Date Value Ref Range Status   08/07/2019 BK Virus DNA Not Detected BKNEG^BK Virus DNA Not Detected copies/mL Final       Parvovirus Testing  No lab results found.    Invalid input(s): \"PRVRES\"    Adenovirus Testing  No lab results found.    Invalid input(s): \"ADENAB\", \"ADENOVIRUS\", \"ADQT\"    Hepatitis B Testing     Recent Labs   Lab Test 10/26/23  1233 09/21/21  1055 10/25/19  1800 06/05/19  1156 06/05/18  1029 03/01/18  0356   AUSAB 0.67 0.50   < >  --   --  0.41   HBCAB  --   --   --  Nonreactive  --  Nonreactive   HEPBANG Nonreactive Nonreactive   < > Nonreactive   < > Nonreactive    < > = values in this interval not displayed.        Hepatitis C Antibody   Date Value Ref Range Status   06/05/2019 Nonreactive NR^Nonreactive Final     Comment:     Assay performance characteristics have not been established for newborns,   infants, and children     02/19/2018 Nonreactive NR^Nonreactive Final     Comment:     Assay performance characteristics have not been established for newborns,   infants, and children         CMV Antibody IgG   Date Value Ref Range Status   03/01/2018 Canceled, Test credited 0.0 - 0.8 AI Final     Comment:     Test reordered as correct code  SEE CMV QUANTITATIVE PCR     03/01/2018 >8.0 (H) 0.0 - 0.8 AI Final     Comment:     Positive  Antibody index (AI) values reflect qualitative changes in antibody   concentration that cannot be directly associated with clinical condition or   disease state.     02/19/2018 >8.0 (H) 0.0 - 0.8 AI Final     Comment:     Positive  Antibody index (AI) values reflect qualitative changes in antibody   concentration that cannot be directly associated with clinical condition or   disease state.       Varicella Zoster Virus Antibody IgG   Date Value Ref Range " Status   02/19/2018 3.8 (H) 0.0 - 0.8 AI Final     Comment:     Positive, suggests prev. exposure and probable immunity  Antibody index (AI) values reflect qualitative changes in antibody   concentration that cannot be directly associated with clinical condition or   disease state.       EBV Capsid Antibody IgG   Date Value Ref Range Status   03/01/2018 >8.0 (H) 0.0 - 0.8 AI Final     Comment:     Positive, suggests recent or past exposure  Antibody index (AI) values reflect qualitative changes in antibody   concentration that cannot be directly associated with clinical condition or   disease state.     02/19/2018 >8.0 (H) 0.0 - 0.8 AI Final     Comment:     Positive, suggests recent or past exposure  Antibody index (AI) values reflect qualitative changes in antibody   concentration that cannot be directly associated with clinical condition or   disease state.       Toxoplasma Antibody IgG   Date Value Ref Range Status   02/19/2018 <3.0 0.0 - 7.1 IU/mL Final     Comment:     Negative- Absence of detectable Toxoplasma gondii IgG antibodies. A negative   result does not rule out acute infection.  The magnitude of the measured result is not indicative of the amount of   antibody present. The concentrations of anti-Toxoplasma gondii IgG in a given   specimen determined with assays from different manufacturers can vary due to   differences in assay methods and reagent specificity.       Herpes Simplex Virus Type 1 IgG   Date Value Ref Range Status   03/01/2018 >8.0 (H) 0.0 - 0.8 AI Final     Comment:     Positive.  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  Antibody index (AI) values reflect qualitative changes in antibody   concentration that cannot be directly associated with clinical condition or   disease state.     02/19/2018 >8.0 (H) 0.0 - 0.8 AI Final     Comment:     Positive.  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  Antibody index (AI) values reflect qualitative changes in antibody   concentration that cannot be directly  associated with clinical condition or   disease state.       Herpes Simplex Virus Type 2 IgG   Date Value Ref Range Status   03/01/2018 <0.2 0.0 - 0.8 AI Final     Comment:     No HSV-2 IgG antibodies detected.  Antibody index (AI) values reflect qualitative changes in antibody   concentration that cannot be directly associated with clinical condition or   disease state.     02/19/2018 <0.2 0.0 - 0.8 AI Final     Comment:     No HSV-2 IgG antibodies detected.  Antibody index (AI) values reflect qualitative changes in antibody   concentration that cannot be directly associated with clinical condition or   disease state.         Imaging:  Recent Results (from the past 48 hour(s))   US Upper Extremity Venous Duplex Left    Narrative    EXAMINATION: DOPPLER VENOUS ULTRASOUND OF THE LEFT UPPER EXTREMITY,  10/30/2023 1:58 PM     COMPARISON: Left upper extremity venous duplex 10/20/2023    HISTORY: Ongoing left upper extremity swelling, reevaluate for  possible clot    TECHNIQUE:  Gray-scale evaluation with compression, spectral flow and  color Doppler assessment of the deep venous system of the left upper  extremity.    FINDINGS:  Left: No thrombus is demonstrated in the internal jugular, innominate,  subclavian, and axillary veins. There is normal compressibility of the  basilic and cephalic veins. There is normal compressibility of the  upper and lower portions of the brachial vein; Please note the middle  portion of the brachial vein was not evaluated due to an overlying  bandage.    There is retrograde flow in the left axillary vein, mid subclavian  vein, and left cephalic vein near its confluence to the subclavian  vein, likely related to dialysis access.      Impression    IMPRESSION:  No evidence of left upper extremity deep venous thrombosis.    I have personally reviewed the examination and initial interpretation  and I agree with the findings.    JAMES LAI MD         SYSTEM ID:  [id number]   XR Abdomen 1  View    Narrative    EXAMINATION:  XR ABDOMEN 1 VIEW 10/30/2023 6:04 PM     COMPARISON: CT abdomen and pelvis 10/28/2023    HISTORY: acute abdominal pain    TECHNIQUE: Supine frontal views of the abdomen.    FINDINGS: Air-filled, nondilated loops of small and large bowel.  Vascular calcifications visualized within the pelvis. Partially  visualized sternotomy wires appear intact..  No free air, pneumatosis  or portal venous gas.       Impression    IMPRESSION: Nonobstructive bowel gas pattern.    I have personally reviewed the examination and initial interpretation  and I agree with the findings.    ANA CRISOSTOMO DO         SYSTEM ID:  [id number]   XR Chest 1 View    Narrative    EXAM: XR CHEST 1 VIEW 10/30/2023 6:04 PM      HISTORY: interval follow up, lung transplant.    COMPARISON: CT PE and chest radiograph 10/27/2023.     TECHNIQUE: Frontal view of the chest.    FINDINGS:   Median sternotomy wires appear intact. Postoperative clips scattered  throughout the mediastinum. Perihilar and basilar predominant mixed  airspace and interstitial opacities are not significantly changed.  Bilateral costophrenic angles are blunted. Cardiac silhouette stable.  No appreciable pneumothorax.      Impression    IMPRESSION:   1. Persistent perihilar bibasilar mixed interstitial airspace  opacities, not significantly changed compared to 10/27/2023.  2. Small bilateral pleural effusions.    I have personally reviewed the examination and initial interpretation  and I agree with the findings.    ANA CRISOSTOMO DO         SYSTEM ID:  [id number]   MR Chest w/o & w Contrast    Narrative    MRI chest with and without contrast    Indication persistent marked left upper extremity swelling    COMPARISON: 10/10/2019    FINDINGS: Images obtained before and after intravenous administration  of 6 mL Gadavist contrast.  Loculated left pleural effusion. Trace right layering pleural  effusion. Median sternotomy artifact.  Cord and CSF signals appear  unremarkable. Aorta normal size. Main  pulmonary artery mildly enlarged at 3.4 cm. The no obvious brachial  plexus mass on either side. Fatty muscle atrophy throughout. No  enlarged lymph nodes in the chest. Benign morphologically appearing  bilateral axillary lymph nodes with fatty marguerite.   The included upper abdomen shows pericholecystic fluid with a  nondistended gallbladder. No pericardial effusion. No streaky  perihilar opacities relatively similar to chest CT of 10/10/2019  consistent with chronic probable inflammatory/infectious process.  Postcontrast imaging shows normal appearance of the left subclavian  artery..  Prominent lymph nodes possibly noted in the left supraclavicular  space. Density near the left paravertebral lobe pleural space with rim  hyperdensity on recent CT 10/27/2023 showing internal signal on DWI an  lack of enhancement. This may represent a healed infection. History of  prior bilateral lung transplant.  Subcutaneous edema also in the left breast tissues and subcutaneous  left chest wall compared to the right.      Impression    IMPRESSION:  1. Mildly prominent left supraclavicular lymph nodes. Grossly similar  to recent CT scan of the chest 4 days ago.  2. Diffuse muscle atrophy.  3. Abnormal density anterior posterior left lower rib in the left  pleural space which was peripherally calcified on the recent CT. This  may be old inflammatory/infectious process.  4. Loculated left pleural effusion with small free-flowing right  pleural effusion. 9 skin thickening of left breast.  5. Skin thickening of left breast. Please correlate with dedicated  breast imaging as appropriate. Edema in the left breast tissues as  well as left chest wall. Possible inflammatory/infectious process.  6. Bilateral lung transplant history.  7. Nondistended gallbladder with small amount of pericholecystic fluid  in patient with reported previous history cholelithiasis.    TERRI ISSA MD         SYSTEM ID:   [id number]     CT CHEST 10/24/2023                                                                   IMPRESSION: Worsening consolidation bilaterally concerning for  worsening infection. New prominent left upper lobe anterior  pleural-based soft tissue thickening. Recommend follow to clearing.  Neoplasm somewhat less likely given the short interval but cannot be  entirely excluded. Mild air trapping.  Ectasia of the mid ascending thoracic aorta.  Left breast skin thickening and edema in the breast tissues again  present. Rim calcified almost certainly benign density at the left  lung base. Trace pleural effusions bilaterally.  Unchanged osteopenic T5 compression deformity.  Atherosclerosis.    MR Chest 10/25/2023  IMPRESSION:  1. Mildly prominent left supraclavicular lymph nodes. Grossly similar  to recent CT scan of the chest 4 days ago.  2. Diffuse muscle atrophy.  3. Abnormal density anterior posterior left lower rib in the left  pleural space which was peripherally calcified on the recent CT. This  may be old inflammatory/infectious process.  4. Loculated left pleural effusion with small free-flowing right  pleural effusion. 9 skin thickening of left breast.  5. Skin thickening of left breast. Please correlate with dedicated  breast imaging as appropriate. Edema in the left breast tissues as  well as left chest wall. Possible inflammatory/infectious process.  6. Bilateral lung transplant history.  7. Nondistended gallbladder with small amount of pericholecystic fluid  in patient with reported previous history cholelithiasis.

## 2023-11-01 NOTE — PLAN OF CARE
Goal Outcome Evaluation:      Plan of Care Reviewed With: patient    Overall Patient Progress: no changeOverall Patient Progress: no change    Outcome Evaluation: Pt alert and oriented x4. VSS on 2L. Tessalon given x1 for cough. Pt c/o of L abd pain that has improved. Pt up with A! to bedside commode. Pt has redish/flaky skin. Pt refused kenalog and hydrocortisone cream overnight. L arm more swollen than R. R PIV TKO. L arm fistula in place. Continue w/ POC.

## 2023-11-01 NOTE — PROGRESS NOTES
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 Glasgow. Run time: 3.5 hrs. EDW 59.7 kg. Access: L AVF.  - Dialysis per MWF schedule        # pAfib: on BB, does not appear to be on AC    # BP/Volume: EDW 59.7 kg, PTA metoprolol 25 mg bid, on occasion pt requires prn midodrine however generally her BP's are in 150-170's. Minimal UOP  -  improving volume status with 1-2+ edema of R arm and leg only  - UF 2-3 kg goal  - ordered prn midodrine 10 mg  - pre HD wt today 62.5 kg    # L upper and lower extremity > R upper and lower extremity, improving:  - US ruled out DVT  - US of fistula pending  - extensive US of other L arm vessels pending  - MRI without obvious obstructing mass    # Anemia of CKD: hgb 8's. PTA on Mirerca 75mcg [id number] (last dose 9/27, due for next dose); venofer 50 mg qweek.  -start epogen 6000 units per HD  - hold iron in setting of infection    # BMD: Ca 8's, phos 2.6, alb 3.0; PTA on PO calcitriol 0.75 mcg MWF, Phoslo 2 tabs tid WM  - Phoslo is held  - adding phos to dialysate today    # ID: on zosyn, azithromycin, posaconazole    # s/p b/l lung tx: c/b recurrent stenosis  - s/p bronch with dilation  - pulm is considering custom fit stent in case this dilation doesn't hold        Recommendations were communicated to primary team via this note      Adriana Jarvis PA   Division of Renal Disease and Hypertension  [phone]    Interval History :   Seen bedside, now on room air s/p bronch with dilation; dialyzing again today  per usual schedule. Adding phos to dialysate. Phoslo has been held. Pt denies current n/v, CP, chills, SOB        Review of Systems:   4 point ROS neg other than as noted above    Physical Exam:   I/O last 3 completed shifts:  In: 480 [P.O.:480]  Out: 2 [Other:2]   BP 97/59 (BP Location: Right arm)   Pulse 98   Temp 97.6  F (36.4  C) (Oral)   Resp 20   Wt 61.8 kg (136 lb 3.2 oz)   LMP 06/07/2014 (Exact Date)   SpO2 97%   BMI 24.13 kg/m       GENERAL APPEARANCE: alert, NAD  EYES: no scleral icterus, pupils equal  Pulmonary: diminished  CV: RRR   - Edema 1-2+ L upper and lower extremities  GI: soft, non-tender   MS: no evidence of inflammation in joints, no muscle tenderness  : no rivas  NEURO: face symmetric, a/o3  Access: L AVF    Labs:   All labs reviewed by me  Electrolytes/Renal -   Recent Labs   Lab Test 11/01/23  0722 10/31/23  0606 10/30/23  1706 10/30/23  0652 10/27/23  0701 10/26/23  2132 10/25/23  1356 10/24/23  1033    137 136 137   < >  --    < > 138   POTASSIUM 3.2* 3.6 3.4 3.4   < >  --    < > 3.7   CHLORIDE 101 101 99 101   < >  --    < > 96*   CO2 26 26 25 23   < >  --    < > 31*   BUN 19.0 21.2 11.0 26.4*   < >  --    < > 14.6   CR 2.72* 2.60* 1.94* 3.44*   < >  --    < > 3.43*   GLC 90 130* 134* 116*   < >  --    < > 120*   CHELSEY 8.6* 8.2* 7.9* 8.9   < >  --    < > 9.0   MAG 1.5*  --   --   --   --  1.5*  --  1.9   PHOS 2.6 3.1  --  4.0   < >  --    < >  --     < > = values in this interval not displayed.       CBC -   Recent Labs   Lab Test 11/01/23  0722 10/31/23  0606 10/30/23  1706   WBC 1.6*  1.6* 2.1*  2.1* 3.1*   HGB 8.0* 7.6* 7.9*   * 138* 152       LFTs -   Recent Labs   Lab Test 11/01/23  0722 10/31/23  0606 10/30/23  0652   ALKPHOS 131* 124* 114*   BILITOTAL 0.7 0.6 0.7   ALT 19 19 20   AST 14 12 12   PROTTOTAL 5.8* 5.3* 6.0*   ALBUMIN 3.0* 2.9* 3.1*       Iron Panel -   Recent Labs   Lab Test 02/03/21  0415 12/13/18  1033 08/01/18  0921   IRON 51 16* 93   IRONSAT 36  7* 37   YOLA  --  302* 571*         Imaging:  Reviewed    Current Medications:   acetylcysteine  2 mL Nebulization TID    [START ON 11/3/2023] amoxicillin-clavulanate  1 tablet Oral Q24H    [START ON 11/2/2023] amoxicillin-clavulanate  1 tablet Oral Once    [Held by provider] azaTHIOprine  50 mg Oral QPM    azithromycin  250 mg Oral Daily    [Held by provider] calcium acetate  667 mg Oral TID w/meals    epoetin alisha-epbx  6,000 Units Intravenous Once in dialysis/CRRT    filgrastim-sndz  350 mcg Subcutaneous Once    fluticasone-vilanterol  1 puff Inhalation Daily    heparin ANTICOAGULANT  5,000 Units Subcutaneous Q12H    hydrocortisone   Topical BID    HYDROmorphone  0.2 mg Intravenous Once    levalbuterol  1.25 mg Nebulization 3 times daily    [Held by provider] metoprolol tartrate  25 mg Oral or Feeding Tube BID    midodrine  10 mg Oral TID w/meals    montelukast  10 mg Oral At Bedtime    multivitamin RENAL  1 tablet Oral Daily    - MEDICATION INSTRUCTIONS -   Does not apply Once    pantoprazole  40 mg Oral QAM AC    piperacillin-tazobactam  2.25 g Intravenous Q6H    posaconazole  300 mg Oral Daily    predniSONE  15 mg Oral QAM    Followed by    [START ON 11/3/2023] predniSONE  10 mg Oral QAM    Followed by    [START ON 11/6/2023] predniSONE  5 mg Oral QAM    sodium chloride 0.9%  250 mL Intravenous Once in dialysis/CRRT    sodium chloride 0.9%  300 mL Hemodialysis Machine Once    sulfamethoxazole-trimethoprim  1 tablet Oral Once per day on Monday Wednesday Friday    tacrolimus  0.5 mg Oral BID IS    triamcinolone   Topical BID    [START ON 11/3/2023] valGANciclovir  450 mg Oral Q Mon Wed Fri AM    Vitamin D3  50 mcg Oral Daily      - MEDICATION INSTRUCTIONS -       MERRICK Coronado

## 2023-11-01 NOTE — PROGRESS NOTES
[facility]    Medicine Progress Note - Medicine Service, JOSE TEAM 3    Date of Admission:  10/25/2023    Assessment & Plan   Kecia Blue is a 62 year old female admitted on 10/25/2023. 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    Today:  - Interventional pulm completed bronchoscopy with BAL today 11/1/23  - US LUE AV dialysis fistula- showing Central venous stenosis/occlusion  - C/w with prednisone burst (15 mg dose - 10/31-11/2) per rheum  - IR consult for LUE fistulogram   - Holding Azathioprine  - SubQ GCSF today  - Valgancyclovir decreased to MWF  - Zosyn to stop today, start Augmentin in AM for 10 day course (500mg q12hr with renal adjustment)       #LUE swelling, unchanged  #LLE swelling, stable- improved  #L breast rash, chronic >1 yr  #Lymphadenopathy (?reactive)- retroperitoneal, bilateral axillary, L retropectoral regions  Pt's L arm appearing asymmetrically swollen compared to R arm, initially noted 10/27/23. L arm swelling was a notable change from previous days of hospitalization.  On 10/28, LLE also more swollen and erythematous vs RLE.  Edema significant to the point that wedding ring had to be cut off with mechanical saw. D/t presence of unilateral extremity swelling, there is concern for DVT.  LUE US performed 10/27, 10/28, 10/30 all negative for DVT. B/l LE US 10/26, 10/28 negative for PE. Pt also has approx 1 yr history of peau d'orange appearance of L  breast- of note had mammogram in 02/2023 which was negative for malignancy. Pt also at risk of post transplant lymphoproliferative disorders given hx of b/l lung transplant c/b EBV virema. Multiple prominent retroperitoneal and L periaortic (?reactive) lymph nodes noted on CT abdomen 10/28, PTLD not ruled out, per pulm transplant team. CT chest 10/28 with no evidence of PE, but multiple b/l axillary and L retropectoral (?reactive) lymph nodes noted, L breast skin thickening and breast edema of uncertain etiology noted. Have discussed c/f fistula issue v. steal syndrome with nephrology who after chart review and physical exam feels confident this is not source of edema, did not identify indication to pursue fistulogram at this point; however radiology recommending US of LUE AV fistula as reasonable initial test to assess for fistula dysfunction. Appreciating input from radiology for recommended imaging modalities for further assessment, considering compressive mass contributing to SVC picture as possible etiology of LUE swelling.   - US LUE AV dialysis fistula - demonstrating stenosis/occlusion  - MRI Chest w/ contrast with no overt signs of UE obstruction, unspecified chest/breast swelling at this point with US findings will defer further investigation  - Plan to place IR consult for LUE Fistulogram  - Pulm transplant following:   - Appreciate pulm transplants perspective on PTLD as etiology of edema    #Erythroderma  #Diffuse erythema, resolved  #Diffuse pruritus, resolved  #C/f recurrent dermatomyositis  Pt noted to have blanchable erythema diffusely throughout torso and extremities upon admission while in ED. Appears was acute in onset. Differential including recurrent dermatomyositis, viral exanthem (hx EBV viremia, CMV viremia on VGCL), or drug eruption. Pt had received zosyn prior to rash in ED, along with Augmentin the day before arrival to ED. Rash appeared prior to vancomycin being administered in ED, this  was confirmed by ED pharmacist on staff day of admission. Dermatology consulted; not felt to be drug rash; unsure if acute DM flare d/t absence of shawl sign, holster sign, muscle weakness, however Pt with periorbital edema and plaques suggestive of DM. Diffuse erythema resolving, no pruritus since day of admission; however Pt now with near full-body erythrodermic eruption involving face, trunk, extremities. Rheumatology also following d/t concern that skin presentation could be 2/2 DM flare. Rheum panels ordered and being followed by rheum. Per Rheum, unlikely to be acute DM flare, however, reasonable to treat rash with prednisone burst and topical steroids, which was discussed and approved with transplant pulmonology. Cutaneous findings improving in response to steroids.   - Dermatology following:   -No indication for skin biopsy at this time  - Rheumatology following:   -Dermatomyositis serologies / rheum panels followed by rheum   -Prednisone 20mg x3 days (completed), 15mg x3 days (current), 10mg x3 days then return to pta 5mg qday  - c/w Atarax 25mg q6h PRN for itching    #Acute hypoxic respiratory failure, improving  #Recurrent right mainstem bronchial stenosis s/p several dilations  #Pneumonia, improving  #Multifocal progressive pulmonary consolidations  #Chronic lung allograft dysfunction  #Actinomyces (+) BAL (10/17)  #ILD 2/2 anti-synthetase syndrome s/p B/L lung transplant 3/2018  #Hx Stenotrophomonas pneumonia  #Hx EBV viremia  #Hx PJP  Pt with 2month hx of cough c/f pneumonia. Hx BL lung transplant (2018) c/b right mainstem bronchial stenosis requiring serial treatments with balloon bronchoplasty procedures, most recently on 10/17/23.  CT Chest from 10/24 notable for worsening consolidation BL c/f worsening infection, along with new prominent KONRAD soft tissue thickening. BAL on 10/17 notable actinomyces. Pt also has hx of stenotrophomonas from 08/2023. Completing infectious work-up with UA, Bcx x2.  MRSA/MSSA PCR 10/25/23 negative.  Blood cultures collected 10/25/23, pending results. CrAg, HBV, Respiratory panel PCR 10/26 returned negative. Chest CT and serial CXR findings of bilateral perihilar airspace opacification, and small b/l pleural effusions remaining largely during hospitalization, no pneumothorax. For CLAD, PTA was on azithromycin, singulair, advair. Azithromycin held for period during current admission d/t prolonged Qtc on EKG, but was restarted 10/30 after Qtc normalization on EKG. Echo 10/27/23 showing normal appearing RV, pulmonary hypertension noted. Cough improving w/ use of tessalon pearles. Remaining transplant ID and transplant pulmonary work-up pending results.  -Interventional pulm consulted:   -bronchoscopy scheduled for 11/1/23 possible dilatation, BAL samples obtained  -Transplant ID following:  -Switch to augmentin PO for 10d course for post-obstruction pneumonia.   -C/w tessalon pearles 100mg q4h PRN  -Scheduled nebs for breathing  -Pulm transplant following:   -Wean supplemental O2 PRN to maintain o2 saturations >92%   -DSA report 10/30, interpretation per pulm transplant  -ImmuKnow (10/27) pending  -EBV levels per transplant pulm   -Hold azathioprine 50mg qday.   -c/w tacrolimus. Check levels per translplant pulm   -c/w Bactrim for PJP ppx  -c/w azithromycin, singulair + advair for CLAD  -SubQ GCSF today    #CMV viremia  #Leukopenia  Low-grade CMV viremia since 8/2023. Has been following with transplant ID team. Was started on VGCV three times weekly after HD at approx 10/6/23. VGCV dosing increased to daily beginning 10/27 given persisting low-level viremia on repeat CMV levels. WBC trending downwards over the past week 11.6 (10/25) --> 2.1 (10/31) following change in dosing frequency of VGCV.   -ID transplant following:  - Decrease valgancyclovir 450 mg to three times weekly on dialysis days after receiving dialysis. Due to neutropenia/leukopenia  -CMV levels per transplant  ID/transplant pulm    #Acute abdominal pain, LLQ, resolving  #Watery diarrhea, resolved  #h/o C diff colitis  Sudden onset of acute LLQ abdominal pain in afternoon of 10/30. for sudden acute abdominal pain. Had a more formed BM this morning, no other stooling since. Anorexia currently without emesis. Lactate, CBC, BMP all wnl. AXR with nonobstructive bowel gas pattern. Etiology possibly 2/2 gastroenteritis. Pt with 6 watery BM's evening of 10/29. No watery diarrhea since 10/29. Two BM's that were more formed 10/30 and one again on morning of 10/31. Pt appears to have remote hx of C. Diff colitis. C diff toxin and enteric bacteria/virus panel PCR was initially ordered 10/30 in setting of new acute onset watery diarrhea night prior. Per lab policy and given Pt's resolution of watery diarrhea, deferring further infectious workup of possible gastroenteritis at this time.   - No further w/u at this time. Will continue to monitor for changes in BMs/worsening of sxs.    #Recurrent transient hypotension w/o altered mentation, improving  Rapid response was called overnight on 10/26 and 10/27 d/t hypotension, tachycardia, and tachypnea. Doppler venous US of bilateral lower extremities on 10/26 with no DVT. PTA metoprolol held. Lactate 1.2. Temperature remains < 100.4* F. Hypotension responsive to albumin and midodrine. Bedside cardiac ultrasound  10/27 with collapsible IVC. Main concern on differential remains sepsis 2/2 suspected pulmonary infection, drug reaction, or possible adrenal insufficiency. However, episodic hypotension may be within Pt's normal range, as Pt continues to remain asymptomatic during RRTs. TTE completed 10/27 w/ EF 60-65%. Pulmonary hypertension noted. Of note, started on steroids for rash on 10/28, no RRTs since 10/28.  - C/w midodrine 10 mg TID     #Prolongation of Qtc, resolved  #Arrythmia (Afib)  EKG 10/26/23 with prolonged Lug=342; normalized to 455 on EKG 10/30.  EKG 10/27 demonstrating Afib.     -Repeat EKG tomorrow    #ESRD on HD (M, W, F)  #Anemia of CKD, macrocytic  #Hypophosphatemia, resolved  #Hx Hyperphosphatemia  Hx of ESRD on HD, currently M, W, F.  Phoslo held on 10/29 due downtrending phos; recheck notable for phos 1.1; required IV + PO supplementation with resolution of hypophosphatemia, continuing to monitor trend. Hg trending downwards, will appreciate nephrology recs.  -C/w iHD per renal dialysis team  -ESRD service consulted:   -c/w pta vitamins   -Epo/iron per nephrology   -hold phoslo d/t hypophosphatemia  -Renal panel daily    #Hx Left Sided Aspergillus Empyema:  First noted in 2019. Needle aspiration confirmatory of aspergillus fumigatus, now s/p intrapleural amphotericin bead placement. Discussions had regarding surgery, however, surgery felt to be associated with too high of morbidity, and indefinite posaconazole decided as plan.  -Transplant pulmonology following   - c/w PTA posaconazole. Check levels per transplant pulm.    Diet: Renal Diet (dialysis)    DVT Prophylaxis: Heparin SQ  Lines: None     Cardiac Monitoring: None  Code Status: Full Code        Disposition Plan     Expected Discharge Date: 11/02/2023      Destination: home with family  Discharge Comments: Dispo: home w/ family, OP HD  Plan: AV fistula US and MRI chest; possible Sup.VenaCava Syndrome?          The patient's care was discussed with the Attending Physician, Dr. Rey .    George Jean  Medical Student  Medicine Service, [facility] TEAM 3  [facility]  Securely message with [employer] (more info)  Text page via [facility] Paging/Directory   See signed in provider for up to date coverage information    Resident/Fellow Attestation   I, Donny Shah MD, was present with the medical/MARIA A student who participated in the service and in the documentation of the note.  I have verified the history and personally performed the physical exam and medical decision making.  I  agree with the assessment and plan of care as documented in the note.      Donny Shah MD  PGY2  Date of Service (when I saw the patient): 11/01/23   ______________________________________________________________________    Interval History   - no acute overnight events   - remains free of abdominal pain overnight and this morning  - improvement in LE edema bilaterally  - LUE remains swollen. US of LUE AV fistula not completed yesterday d/t scheduling conflict.   - Skin rash improving. Remains free of pruritus    Physical Exam   Vital Signs: Temp: 97.9  F (36.6  C) Temp src: Oral BP: 112/69 Pulse: 89   Resp: 21 SpO2: 97 % O2 Device: Oxymask Oxygen Delivery: 3 LPM  Weight: 136 lbs 3.2 oz    General Appearance: Awake (had been awoken from sleep), laying on side in bed, appears comfortable  Respiratory: no labored breathing or use of accessory muscles on gross examination  Skin: dry flaky skin remains, improving, diffuse across body       Data   NOTE: Data reviewed over the past 24 hrs contributes toward MDM complexity

## 2023-11-01 NOTE — OR NURSING
Bronch with dilation and lavage under conscious sedation.  Pt required nasal trumpet insertion to right nare.  Pt required high levels of 02 during procedure.  Pt tolerated dilation and lavage.  Post pt was titrated down on 02 over 10 minutes.  Satting fine on 3lpm 02 via oxymask.  Report called to Esau at 0937.

## 2023-11-01 NOTE — PLAN OF CARE
Goal Outcome Evaluation:      Plan of Care Reviewed With: patient    Overall Patient Progress: no changeOverall Patient Progress: no change    Outcome Evaluation: Writer unable to work with pt today d/t constant transport to tests. Pt had Bronch at 9am, results pending. Pt went for US of YADI to investigate fluid accumulation around L fistula (see results review). Pt then left for HD around 14:00 and pt is still off the unit. Discharge plan would be to home once medical stability achieved. Pt abx plan may change depending on bronch results.

## 2023-11-01 NOTE — PROCEDURES
INTERVENTIONAL PULMONOLOGY       Procedure(s):    A flexible bronchoscopy  Airway exam  BAL  Balloon bronchoplasty without stent placement (1 site)       Indication:  RMB stenosis    Attending of Record:  Beto Magaña DO     Interventional Pulmonary Fellow   Hayden Lou    Trainees Present:   None     Medications:    9 ml 4% lidocaine  12 ml 1% lidocaine  1 spray(s) hurricaine spray  2 mg versed  100 mcg fentanyl  4 mg Zofran    Sedation Time:   Total sedation time was Choose Duration: 17 minutes of continuous bedside 1:1 monitoring.    Time Out:  Performed    The patient's medical record has been reviewed.  The indication for the procedure was reviewed.  The necessary history and physical examination was performed and reviewed.  The risks, benefits and alternatives of the procedure were discussed with the the patient in detail and she had the opportunity to ask questions.  I discussed in particular the potential complications including risks of minor or life-threatening bleeding and/or infection, respiratory failure, vocal cord trauma / paralysis, pneumothorax, and discomfort. Sedation risks were also discussed including abnormal heart rhythms, low blood pressure, and respiratory failure. All questions were answered to the best of my ability.  Verbal and written informed consent was obtained.  The proposed procedure and the patient's identification were verified prior to the procedure by the physician and the nurse.    The patient was assessed for the adequacy for the procedure and to receive medications.   Mental Status:  Alert and oriented x 3  Airway examination:  Class I (Complete visualization of soft palate)  Pulmonary:  Non labored respirations  CV:  Regular rate  ASA Grade:  (II)  Mild systemic disease    After clinical evaluation and reviewing the indication, risks, alternatives and benefits of the procedure the patient was deemed to be in satisfactory condition to undergo the procedure.       Immediately before administration of medications the patient was re-assessed for adequacy to receive sedatives including the heart rate, respiratory rate, mental status, oxygen saturation, blood pressure and adequacy of pulmonary ventilation. These same parameters were continuously monitored throughout the procedure.    A Tuberculosis risk assessment was performed:  The patient has no known RISK of Tuberculosis    The procedure was performed in a negative airflow room: Yes    Maneuvers / Procedure:      A Flexible  bronchoscope was used for the procedure. The flexible bronchoscope was inserted through the mouth observing the epiglottis and posterior pharyngeal structures. The VC were anesthetized with 1% and 4% lidocaine. The scope was then advanced throught the cords and into the trachea.      Airway Examination: A complete airway examination was performed from the distal trachea to the subsegmental level in each lobe of both lungs.  Pertinent findings include stenosis of RMB anastomosis, unable to pass therapeutic bronchoscope, LMB anastomosis looks normal without malacia or stenosis.     Airway dilation: Airway dilation#1: The Right mainstem  airway was dilated to 12mm . Each dilation was held for 60sec and repeated 2 times. Post dilation was able to pass therapeutic scope into BI, RML orifice also narrowed. Distance from RMB stenosis to main gee is 2 cm, from RML take off to RC1 is 2 cm.           BAL: The bronchoscope was wedged into the RLL anterior segmental bronchus. A total of 120cc of saline was instilled and a total of 35 ml was aspirated. This was sent for analysis.     Any disposable equipment was visually inspected and deemed to be intact immediately post procedure.      Relevant Pictures  Main gee        LMB anastomosis        RMB anastomosis with stenosis        Distal BI after dilation        Assessment and Recommendations:     Successful completion of FB with Balloon dilation of RMB   moderate anastomosis, BAL RLLL  Recommend to follow patient clinically post dilation, if symptoms recurs recommend custom made silicone stenting (we will start planning for it)  Follow up BAL results          Hayden Lou  Interventional pulmonary fellow  Pager: [phone]

## 2023-11-01 NOTE — PROGRESS NOTES
Date: 11/1/2023  Time: 1832     Data:  Pre Wt:   62.5 kg  Desired Wt:To be established  Post Wt:  60.3 kg (estimated)  Weight change: - 2.2 kg  Ultrafiltration - Post Run Net Total Removed (mL):  2205 ml  Vascular Access Status: Fistula patent  Dialyzer Rinse:  Light  Total Blood Volume Processed: 70.3 L   Total Dialysis (Treatment) Time:   3.5 Hrs  Dialysate Bath: K 4, Ca 3  Heparin: Heparin: None     Lab:   No    Interventions:  Dialysis done through left AV Fistula using 15 gauge needles. UF set to 2.8 Liters, accommodating priming and rinse back volumes.UF net  decrease to 2.2 L.due to decreasing Bp.  Pt wanted to order dinner at the end of her run , so because she wants to eat back in her floor.  , . Medication administered per MAR.  Decannulation done post HD, hemostasis is achieved in 10 minutes. Pressure dressing is applied, to be removed after 4-6 hours.Report given to PCN, sent back to Bed [facility] room in stable condition.     Assessment:  A/O x 4, calm and cooperative, denies pain  Lung sounds clear anterior and lateral BUL,  BLL  Left  arm AV Fistula has good thrill and bruit                Plan:    Per Renal team      ARIES Newell, RN  Acute Dialysis RN  [facility] & [facility]

## 2023-11-02 NOTE — PLAN OF CARE
Hours of care 1900 - 2300     A&Ox4. VSS. R PIV TKO. Went down for ct chest @ 2000. No acute changes this shift.

## 2023-11-02 NOTE — PROGRESS NOTES
Interventional Pulmonology          Follow-up Inpatient Progress Note                                      November 2, 2023              Patient: Kecia Blue  Date of Service: 11/2/2023    Date of Admission: 10/25/2023      Assessment:   Kecia Blue is a 62 year old admitted on 10/25/2023 with worsening respiratory failure. Interventional Pulmonology is consulted today for bronchoscopy , airway examination.     S/p BSLT 2018, complicated by RMB stenosis, post transplant infection  S/p Bronchoscopy and balloon dilation of RMB stenosis on Oct 17 2023  S/p FB and Balloon dilation RMB and BAL RLL  CMV viremia  Pneumonia  Afib   Left arm swelling     Plan:  Continue supplemental oxygen  Antimicrobials per ID  Pt today feels slightly better after balloon dilation  We have started the process of  custom made silicon stent design for RMB stenosis  Pt can be discharged from IP prospective  We will contact the pt to arrange the bronchoscopy when the stent is ready     IP will sign off, call if needed     Patient seen and discussed with Dr. Archana Lou  Interventional Pulmonary Fellow     Pager: [phone]         Interval History:   S/p FB with RMB balloon dilation, RLL BAL, feels better, deneis hemoptysis, still on oxygen                  Review of Systems:  Resp: see interval history  Psych: mood stable           Physical Exam:   Temp:  [97.3  F (36.3  C)-98.3  F (36.8  C)] 98.3  F (36.8  C)  Pulse:  [] 93  Resp:  [10-40] 25  BP: ()/(53-90) 130/66  Cuff Mean (mmHg):  [69-96] 96  SpO2:  [80 %-100 %] 98 %    Intake/Output Summary (Last 24 hours) at 11/2/2023 1051  Last data filed at 11/1/2023 1815  Gross per 24 hour   Intake --   Output 2 ml   Net -2 ml

## 2023-11-02 NOTE — PROGRESS NOTES
Physician Attestation   I, Emanuel Rey MD, was present with the medical/MARIA A student who participated in the service and in the documentation of the note.  I have verified the history and personally performed the physical exam and medical decision making.  I agree with the assessment and plan of care as documented in the note.      Please see A&P for additional details of medical decision making.        Emanuel Rey MD  Date of Service (when I saw the patient): 11/02/23      [facility]    Medicine Progress Note - Medicine Service, [facility] TEAM 3    Date of Admission:  10/25/2023    Assessment & Plan   Kecia Blue is a 62 year old female admitted on 10/25/2023. 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 Later developed significant LUE edema c/f central venous obstruction in setting of LUE AV fistula for HD.    Today:  - LUE IR dialysis fistulogram and central segment angioplasty, brachiocephalic stenosis noted with multiple chest collaterals, discussed with IR, these findings correlate clinically with her extremity and chest edema. Will monitor for improvement.   - CXR with b/l pleural effusions w/ bibasilar atelectasis   - US abdomen evaluating for cholelithiasis, sludge but no obstruction or cholecystitis  - Stop Zosyn.  Start Augmentin for 14 day total abx course (500mg q12hr with renal adjustment). Per pulm and ID  transplant      #LUE swelling, improving  #LLE swelling, stable- improved  #L breast rash, chronic >1 yr  #Lymphadenopathy (?reactive)- retroperitoneal, bilateral axillary, L retropectoral regions  Pt's L arm appearing asymmetrically swollen compared to R arm, initially noted 10/27/23. L arm swelling was a notable change from previous days of hospitalization.  On 10/28, LLE also more swollen and erythematous vs RLE.  Edema significant to the point that wedding ring had to be cut off with mechanical saw. D/t presence of unilateral extremity swelling, there is concern for DVT.  LUE US performed 10/27, 10/28, 10/30 all negative for DVT. B/l LE US 10/26, 10/28 negative for PE. Pt also has approx 1 yr history of peau d'orange appearance of L breast- of note had mammogram in 02/2023 which was negative for malignancy. Pt also at risk of post transplant lymphoproliferative disorders given hx of b/l lung transplant c/b EBV virema. Multiple prominent retroperitoneal and L periaortic (?reactive) lymph nodes noted on CT abdomen 10/28, PTLD not ruled out, per pulm transplant team. CT chest 10/28 with no evidence of PE, but multiple b/l axillary and L retropectoral (?reactive) lymph nodes noted, L breast skin thickening and breast edema of uncertain etiology noted. Have discussed c/f fistula issue v. steal syndrome with nephrology who after chart review and physical exam feels confident this is not source of edema, did not identify indication to pursue fistulogram at this point; however radiology recommending US of LUE AV fistula as reasonable initial test to assess for fistula dysfunction. Appreciating input from radiology for recommended imaging modalities for further assessment, considering compressive mass contributing to SVC picture as possible etiology of LUE swelling. Fistulogram with venoplasty performed, brachiocephalic stenosis noted with multiple chest collaterals, discussed with IR, these findings correlate clinically  with her extremity and chest edema. Will monitor for improvement.   - US LUE AV dialysis fistula - demonstrating stenosis/occlusion  - MRI Chest w/ contrast with no overt signs of UE obstruction, unspecified chest/breast swelling at this point with US findings will defer further investigation  - Plan to place IR consult for LUE Fistulogram  - Pulm transplant following:   - Appreciate pulm transplants perspective on PTLD as etiology of edema    #Erythroderma  #Diffuse erythema, resolved  #Diffuse pruritus, resolved  #C/f recurrent dermatomyositis  Pt noted to have blanchable erythema diffusely throughout torso and extremities upon admission while in ED. Appears was acute in onset. Differential including recurrent dermatomyositis, viral exanthem (hx EBV viremia, CMV viremia on VGCL), or drug eruption. Pt had received zosyn prior to rash in ED, along with Augmentin the day before arrival to ED. Rash appeared prior to vancomycin being administered in ED, this was confirmed by ED pharmacist on staff day of admission. Dermatology consulted; not felt to be drug rash; unsure if acute DM flare d/t absence of shawl sign, holster sign, muscle weakness, however Pt with periorbital edema and plaques suggestive of DM. Diffuse erythema resolving, no pruritus since day of admission; however Pt now with near full-body erythrodermic eruption involving face, trunk, extremities. Rheumatology also following d/t concern that skin presentation could be 2/2 DM flare. Rheum panels ordered and being followed by rheum. Per Rheum, unlikely to be acute DM flare, however, reasonable to treat rash with prednisone burst and topical steroids, which was discussed and approved with transplant pulmonology. Cutaneous findings improving in response to steroids.   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 Hx BL lung transplant (2018) c/b right mainstem bronchial stenosis requiring serial treatments with balloon bronchoplasty procedures, most recently on 10/17/23.  CT Chest from 10/24 notable for worsening consolidation BL c/f worsening infection, along with new prominent KONRAD soft tissue thickening. BAL on 10/17 notable actinomyces. Pt also has hx of stenotrophomonas from 08/2023. Completing infectious work-up with UA, Bcx x2. MRSA/MSSA PCR 10/25/23 negative.  Blood cultures collected 10/25/23, pending results. CrAg, HBV, Respiratory panel PCR 10/26 returned negative. Chest CT and serial CXR findings of bilateral perihilar airspace opacification, and small b/l pleural effusions remaining largely during hospitalization, no pneumothorax. For CLAD, PTA was on azithromycin, singulair, advair. Azithromycin held for period during current admission d/t prolonged Qtc on EKG, but was restarted 10/30 after Qtc normalization on EKG. Echo 10/27/23 showing normal appearing RV, pulmonary hypertension noted. Cough improving w/ use of tessalon pearles. Remaining transplant ID and transplant pulmonary work-up pending results.  -Interventional pulm consulted:   -bronchoscopy scheduled for 11/1/23 possible dilatation, BAL samples obtained  -Transplant ID following:  -Switch to augmentin PO for 10d course for post-obstruction pneumonia.   -C/w tessalon pearles 100mg q4h PRN  -Scheduled nebs for  breathing  -Pulm transplant following:   -Wean supplemental O2 PRN to maintain o2 saturations >92%   -DSA report 10/30, interpretation per pulm transplant  -ImmuKnow (10/27) pending  -EBV levels per transplant pulm   -Hold azathioprine 50mg qday.   -c/w tacrolimus. Check levels per translplant pulm   -c/w Bactrim for PJP ppx  -c/w azithromycin, singulair + advair for CLAD  -SubQ GCSF today    #CMV viremia  #Leukopenia  Low-grade CMV viremia since 8/2023. Has been following with transplant ID team. Was started on VGCV three times weekly after HD at approx 10/6/23. VGCV dosing increased to daily beginning 10/27 given persisting low-level viremia on repeat CMV levels. WBC trending downwards over the past week 11.6 (10/25) --> 2.1 (10/31) following change in dosing frequency of VGCV.   -ID transplant following:  - Decrease valgancyclovir 450 mg to three times weekly on dialysis days after receiving dialysis. Due to neutropenia/leukopenia  -CMV levels per transplant ID/transplant pulm    #Acute abdominal pain, LLQ, resolving  #Watery diarrhea, resolved  #h/o C diff colitis  Sudden onset of acute LLQ abdominal pain in afternoon of 10/30. for sudden acute abdominal pain. Had a more formed BM this morning, no other stooling since. Anorexia currently without emesis. Lactate, CBC, BMP all wnl. AXR with nonobstructive bowel gas pattern. Etiology possibly 2/2 gastroenteritis. Pt with 6 watery BM's evening of 10/29. No watery diarrhea since 10/29. Two BM's that were more formed 10/30 and one again on morning of 10/31. Pt appears to have remote hx of C. Diff colitis. C diff toxin and enteric bacteria/virus panel PCR was initially ordered 10/30 in setting of new acute onset watery diarrhea night prior. Per lab policy and given Pt's resolution of watery diarrhea, deferring further infectious workup of possible gastroenteritis at this time.   - No further w/u at this time. Will continue to monitor for changes in BMs/worsening of  sxs.    #Recurrent transient hypotension w/o altered mentation, improving  Rapid response was called overnight on 10/26 and 10/27 d/t hypotension, tachycardia, and tachypnea. Doppler venous US of bilateral lower extremities on 10/26 with no DVT. PTA metoprolol held. Lactate 1.2. Temperature remains < 100.4* F. Hypotension responsive to albumin and midodrine. Bedside cardiac ultrasound  10/27 with collapsible IVC. Main concern on differential remains sepsis 2/2 suspected pulmonary infection, drug reaction, or possible adrenal insufficiency. However, episodic hypotension may be within Pt's normal range, as Pt continues to remain asymptomatic during RRTs. TTE completed 10/27 w/ EF 60-65%. Pulmonary hypertension noted. Of note, started on steroids for rash on 10/28, no RRTs since 10/28.  - C/w midodrine 10 mg TID     #Prolongation of Qtc, resolved  #Arrythmia (Afib)  EKG 10/26/23 with prolonged Pim=356; normalized to 455 on EKG 10/30, 465 on 11/2  EKG 10/27 demonstrating Afib.      #ESRD on HD (M, W, F)  #Anemia of CKD, macrocytic  #Hypophosphatemia, resolved  #Hx Hyperphosphatemia  Hx of ESRD on HD, currently M, W, F.  Phoslo held on 10/29 due downtrending phos; recheck notable for phos 1.1; required IV + PO supplementation with resolution of hypophosphatemia, continuing to monitor trend. Hg trending downwards, will appreciate nephrology recs.  -C/w iHD per renal dialysis team  -ESRD service consulted:   -c/w pta vitamins   -Epo/iron per nephrology   -hold phoslo d/t hypophosphatemia  -Renal panel daily    #Hx Left Sided Aspergillus Empyema:  First noted in 2019. Needle aspiration confirmatory of aspergillus fumigatus, now s/p intrapleural amphotericin bead placement. Discussions had regarding surgery, however, surgery felt to be associated with too high of morbidity, and indefinite posaconazole decided as plan.  -Transplant pulmonology following   - c/w PTA posaconazole. Check levels per transplant pulm.    #  Hypokalemia:   # Hypomagnesemia:   Correction per dialysis    Diet: Renal Diet (dialysis)    DVT Prophylaxis: Heparin SQ  Lines: None     Cardiac Monitoring: None  Code Status: Full Code         Clinically Significant Risk Factors                            # Financial/Environmental Concerns: none         Disposition Plan      Expected Discharge Date: 11/03/2023      Destination: home with family  Discharge Comments: Dispo: home w/ family, OP HD  Plan: pending improvement in LUE edema  Progress: Start augmentin q12h (x14 days)          The patient's care was discussed with the Attending Physician, Dr. Rey .    George Jean  Medical Student  Medicine Service, [facility] TEAM [address] [place] [address]  Securely message with [email or web address] (more info)  Text page via [email or web address] Paging/Directory   See signed in provider for up to date coverage information  ______________________________________________________________________    Interval History   No acute overnight events. Fistulogram with IR this morning.     Physical Exam   Vital Signs: Temp: 98.3  F (36.8  C) Temp src: Oral BP: 130/66 Pulse: 93   Resp: 25 SpO2: 92 % O2 Device: Nasal cannula Oxygen Delivery: 1 LPM  Weight: 136 lbs 3.2 oz      Constitutional: alert and oriented, NAD  Lungs: crackles bilaterally  MSK: swelling of left upper extremity      Data     I have personally reviewed the following data over the past 24 hrs:    5.7  \   7.8 (L)   / 153     135 102 12.3 /  140 (H)   4.0 24 2.23 (H) \     ALT: 20 AST: 18 AP: 153 (H) TBILI: 0.6   ALB: 3.1 (L) TOT PROTEIN: 5.8 (L) LIPASE: N/A

## 2023-11-02 NOTE — PROCEDURES
[facility]    Procedure: IR Procedure Note    Date/Time: 11/2/2023 9:23 AM    Performed by: Dario Urbano MD  Authorized by: Edin Rodriguez MD      UNIVERSAL PROTOCOL   Site Marked: NA  Prior Images Obtained and Reviewed:  Yes  Required items: Required blood products, implants, devices and special equipment available    Patient identity confirmed:  Verbally with patient, arm band, provided demographic data and hospital-assigned identification number  Patient was reevaluated immediately before administering moderate or deep sedation or anesthesia  Confirmation Checklist:  Patient's identity using two indicators, relevant allergies, procedure was appropriate and matched the consent or emergent situation and correct equipment/implants were available  Time out: Immediately prior to the procedure a time out was called    Universal Protocol: the Joint Commission Universal Protocol was followed    Preparation: Patient was prepped and draped in usual sterile fashion       ANESTHESIA    Anesthesia: Local infiltration  Local Anesthetic:  Lidocaine 1% without epinephrine      SEDATION  Patient Sedated: Yes    Sedation:  Fentanyl and midazolam  Vital signs: Vital signs monitored during sedation    See dictated procedure note for full details.  Findings: LUE fistulagram with venoplasty    Specimens: none    Complications: None    Condition: Stable    Plan: LUE fistulagram with venoplasty      PROCEDURE  Describe Procedure: LUE fistulagram with venoplasty  Patient Tolerance:  Patient tolerated the procedure well with no immediate complications  Length of time physician/provider present for 1:1 monitoring during sedation: 30

## 2023-11-02 NOTE — IR NOTE
Patient Name: Kecia Blue  Medical Record Number: [id number]  Today's Date: 11/2/2023    Procedure: image guided percutaneous left upper extremity left axillary to cephalic graft fistulogram with possible intervention  Proceduralist: Michael GREENE, Sundeep GREENE    Procedure Start: 0842  Procedure end: 0915  Sedation medications administered: 4mg zofran IV pre procedure, 2 mg versed IV, 100 mcg fentanyl IV     Report given to: Esau GIL  : n/a    Other Notes: Pt arrived to IR room 4 from . Consent reviewed. Pt denies any questions or concerns regarding procedure. Pt positioned supine and monitored per protocol. Pt tolerated procedure without any noted complications. Pt transferred back to  after xray. Provider to bedside to removed woggle later this afternoon.

## 2023-11-02 NOTE — PROGRESS NOTES
[facility]  Transplant Infectious Disease Progress Note     Patient:  Kecia Blue, Date of birth 1960, Medical record number [id number]  Date of Visit:  11/02/2023         Assessment and Recommendations:   Recommendations:  1. Ok to transition to amox/clav with plans to complete a total of 10-14 days of antibiotics for post-obstructive pneumonia (End date: 11/3 at the earliest)  2. Continue with valganciclovir 450 mg 3 times weekly to be timed after HD. Her last CMV viral load was negative, so awaiting next CMV VL on 11/7.   3. Continue posaconazole at 300 mg/day. Since she has been on the same dose of posaconazole for some time, would repeat the trough tomorrow morning. If this remains low, would increase her dose of posaconazole to 400 mg/day.   4. TMP-SMX, and azithromycin for prophylaxis. '    Thank you for this consult. ID will sign off at this time. Please do not hesitate to call back with additional questions or concerns.     I spent >50 minutes on direct patient care or coordinating patient care activities.     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 Would not consider this pulmonary actinomyces at this time, but rather use that as a surrogate of significant oral contamination that has caused her slowly progressive symptoms. Zosyn is an excellent drug for this syndrome and she already has had signficant improvement since starting Zosyn on 10/25 and rest of work-up has been unrevealing. She underwent repeat bronchoscopy with dilation on 11/1 without complication. Plan is for a custom stent to be created and placed as an outpatient.     #Erythrodermic skin rash, diffuse, pruritus, after receiving vancomycin + pip-tazo  Responded well to diphenhydramine, so it seemed consistent with vanco-infusion syndrome, but given her history of autoimmune disease (dermatomyositis), and the fact the rash persisted for quite awhile, dermatology is involved who feel it may resemble recurrence of autoimmune disease and are considering skin biopsy. Updated allergy list. Her rash has continued to improve while still on pip-tazo.    #CMV viremia  #Leukopenia  Low-grade viremia since 8/2023. She had a level up to log 521 (log 2.7) on 9/7/2023, though was not started on VGCV (three times weekly after HD) until approx 10/6/2023. She remains low-level viremic on 10/17 and 10/24 (log 1.9). She was undetectable throughout rest of 2022 and 2023. She reports taking VGCV in the past for \"cold sores\" for short courses, though I wonder if she means valacyclovir. Either way, she may be at risk for resistance. Increased dose to daily and she is now progressively leukopenic; ANC 1.4 on 10/31 and 1.0 on 11/1, so would transition back to three-times-per week dosing.     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 BAL showed septate hyphae. 7/2020 had hypodense mass in left lung with biopsy showing fungal hyphae, cx aspergillus. Started on voriconazole in 2019, changed to posaconazole in 2020, she remains on 300 mg " daily.  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 Recovery from this required tracheostomy.  2019 - She had also grown Actinomyces from multiple BAL    - QTc interval: 420 msec (10/25/2023)   - Bacterial prophylaxis: Azithromycin  - Pneumocystis prophylaxis: TMP-SMX for life (hx PJP)  - Serostatus & viral prophylaxis: CMV D+/R+, EBV D+/R+  - Fungal prophylaxis: posaconazole   - Immunization status: Seasonal influenza, RSV, and COVID vaccine  - Gamma globulin status: 979 10/27/2023  - Isolation status: Hx MRSA - contact         Summary of presentation:   59F with history of ILD, seronative RA, dermatomyositis s/p bilateral lung transplant 3/2018 with post transplant course c/b left aspergilus empyema (2019, on posaconazole), CMV viremia, ESRD on HD, and 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 WBC has increased to 5.7 after receiving G-CSF. Plan is to undergo stenting using a custom stent as an outpatient.      Transplants:  3/1/2018 (Lung), Postoperative day:  2072.  Coordinator Kacy Martínez           Current Medications & Allergies:      acetylcysteine  2 mL Nebulization 4x Daily    [START ON 11/3/2023] amoxicillin-clavulanate  1 tablet Oral Q24H    amoxicillin-clavulanate  1 tablet Oral Once    [Held by provider] azaTHIOprine  50 mg Oral QPM    azithromycin  250 mg Oral Daily    [Held by provider] calcium acetate  667 mg Oral TID w/meals    fluticasone-vilanterol  1 puff Inhalation Daily    heparin ANTICOAGULANT  5,000 Units Subcutaneous Q12H    hydrocortisone    Topical BID    HYDROmorphone  0.2 mg Intravenous Once    levalbuterol  1.25 mg Nebulization 4x Daily    [Held by provider] metoprolol tartrate  25 mg Oral or Feeding Tube BID    midodrine  10 mg Oral TID w/meals    montelukast  10 mg Oral At Bedtime    multivitamin RENAL  1 tablet Oral Daily    pantoprazole  40 mg Oral QAM AC    posaconazole  300 mg Oral Daily    predniSONE  15 mg Oral QAM    Followed by    [START ON 11/3/2023] predniSONE  10 mg Oral QAM    Followed by    [START ON 11/6/2023] predniSONE  5 mg Oral QAM    sulfamethoxazole-trimethoprim  1 tablet Oral Once per day on Monday Wednesday Friday    tacrolimus  0.5 mg Oral BID IS    triamcinolone   Topical BID    [START ON 11/3/2023] valGANciclovir  450 mg Oral Q Mon Wed Fri AM    Vitamin D3  50 mcg Oral Daily            Physical Exam:     Patient Vitals for the past 24 hrs:   BP Temp Temp src Pulse Resp SpO2 Weight   11/02/23 1023 -- -- -- -- -- 98 % --   11/02/23 1015 -- -- -- -- -- 100 % --   11/02/23 0915 -- -- -- 93 25 95 % --   11/02/23 0910 130/66 -- -- 97 (!) 34 98 % --   11/02/23 0905 124/64 -- -- 96 (!) 31 98 % --   11/02/23 0900 108/63 -- -- 93 (!) 33 99 % --   11/02/23 0855 127/67 -- -- 95 24 100 % --   11/02/23 0850 120/65 -- -- 90 23 98 % --   11/02/23 0845 129/64 -- -- 87 (!) 31 99 % --   11/02/23 0840 129/67 -- -- 90 (!) 40 99 % --   11/02/23 0835 136/65 -- -- 89 16 98 % --   11/02/23 0830 130/72 -- -- 93 10 97 % --   11/02/23 0804 -- -- -- -- -- -- (P) 61.1 kg (134 lb 11.2 oz)   11/02/23 0540 136/68 98.3  F (36.8  C) Oral 93 18 99 % --   11/01/23 2129 106/69 98.1  F (36.7  C) Oral 99 20 96 % --   11/01/23 1902 102/66 97.9  F (36.6  C) Oral 120 16 (!) 88 % --   11/01/23 1818 (!) 108/90 -- -- 93 12 91 % --   11/01/23 1815 112/66 97.3  F (36.3  C) Oral 91 25 98 % --   11/01/23 1800 96/69 -- -- 94 26 95 % --   11/01/23 1745 96/62 -- -- 96 20 90 % --   11/01/23 1734 (!) 89/58 -- -- 93 30 100 % --   11/01/23 1731 (!) 84/53 -- -- 93 23 99 % --  "  11/01/23 1730 (!) 89/62 -- -- 96 29 100 % --   11/01/23 1715 91/58 -- -- 95 16 100 % --   11/01/23 1700 105/63 -- -- 93 16 97 % --   11/01/23 1645 97/64 -- -- 93 16 100 % --   11/01/23 1630 103/63 -- -- 97 10 100 % --   11/01/23 1615 97/67 -- -- 101 16 100 % --   11/01/23 1601 100/68 -- -- 105 22 98 % --   11/01/23 1600 100/68 -- -- 105 22 -- --   11/01/23 1545 101/62 -- -- 95 28 100 % --   11/01/23 1530 105/67 -- -- 94 21 100 % --   11/01/23 1515 98/66 -- -- 93 22 100 % --   11/01/23 1500 112/58 -- -- 109 15 98 % --   11/01/23 1445 104/56 -- -- 94 16 100 % --   11/01/23 1440 99/58 -- -- 92 26 (!) 80 % --   11/01/23 1419 105/59 -- -- 98 16 95 % --   11/01/23 1341 -- -- -- -- -- (P) 98 % --   11/01/23 1336 97/59 97.6  F (36.4  C) Oral 98 20 -- --     Ranges for vital signs:  Temp:  [97.3  F (36.3  C)-98.3  F (36.8  C)] 98.3  F (36.8  C)  Pulse:  [] 93  Resp:  [10-40] 25  BP: ()/(53-90) 130/66  Cuff Mean (mmHg):  [69-96] 96  SpO2:  [80 %-100 %] 98 %  Vitals:    10/31/23 1119 11/01/23 0850 11/02/23 0804   Weight: 63.9 kg (140 lb 14.4 oz) 61.8 kg (136 lb 3.2 oz) (P) 61.1 kg (134 lb 11.2 oz)       Physical Examination:  GENERAL:  Sitting up in bed in no apparent distress.   HEAD:  Head is normocephalic, atraumatic   EYES:  Eyes have anicteric sclerae without conjunctival injection   ENT:  No oral ulcers noted.   NECK:  Supple.   LUNGS:  Crackles noted throughout lungs bilaterally. No wheezes noted.   CARDIOVASCULAR:  Regular rate and rhythm with no murmurs, gallops or rubs.  ABDOMEN:  Normal bowel sounds, soft, nontender.   SKIN:  Diffuse warm erythematous rash that continues to exfoliate.  Line in place without any surrounding erythema or exudate. Bandage over L AVF.  NEUROLOGIC:  Grossly nonfocal. Active x4 extremities         Laboratory Data:     No results found for: \"ACD4\", \"HIVPCR\"    Inflammatory Markers    Recent Labs   Lab Test 10/29/23  0642 10/28/23  1737 10/28/23  0725 10/27/23  0701 " 10/27/19  1147 10/07/19  1221 10/06/19  1444 09/13/19  0934 08/22/19  0820 05/16/18  1006   SED 66*  --  58*  --   --  93*  --  111*   < >  --    CRP  --   --   --   --   --   --  25.0* 58.0*   < >  --    CRPI  --   --  139.00*  --   --   --   --   --   --   --    G6PD  --   --   --   --  19.0*  --   --   --   --   --    RHF  --   --   --  22*  --   --   --   --   --   --    X7TPEXF  --  76*  --   --   --   --   --   --   --  113   L4PYKXJ  --   --   --  15  --   --   --   --   --   --     < > = values in this interval not displayed.       Immune Globulin Studies     Recent Labs   Lab Test 10/27/23  0701 10/24/23  1033 09/15/21  0915 01/31/21  0441 01/25/21  0406 10/27/19  0621 03/01/18  0356 02/19/18  0759     --  1,198 763  --  936 698 1,790*   IGM  --   --   --   --   --   --   --  502*   IGE  --  <2  --   --  <2  --   --  <2   IGA  --   --   --   --   --   --   --  425*   IGG1  --   --   --   --   --   --   --  1,300*   IGG2  --   --   --   --   --   --   --  131*   IGG3  --   --   --   --   --   --   --  101   IGG4  --   --   --   --   --   --   --  1*       Metabolic Studies       Recent Labs   Lab Test 11/02/23  0734 11/01/23  0722 10/31/23  0606 10/30/23  1706 10/28/23  1641 10/28/23  0914 10/28/23  0725 10/27/23  0701 10/26/23  0054 10/25/23  1356 06/27/22  1013 05/26/22  0750 10/22/19  1314 10/21/19  2662    139   < > 136   < >  --    < > 133*   < > 133*   < > 137   < > 133   POTASSIUM 4.0 3.2*   < > 3.4   < >  --    < > 3.5   < > 4.4   < > 3.5   < > 5.0   CHLORIDE 102 101   < > 99   < >  --    < > 95*   < > 96*   < > 96   < > 109   CO2 24 26   < > 25   < >  --    < > 26   < > 26   < > 32   < > 10*   ANIONGAP 9 12   < > 12   < >  --    < > 12   < > 11   < > 9   < > 13   BUN 12.3 19.0   < > 11.0   < >  --    < > 16.0   < > 24.6*   < > 42*   < > 66*   CR 2.23* 2.72*   < > 1.94*   < >  --    < > 3.00*   < > 4.90*   < > 3.98*   < > 5.76*   GFRESTIMATED 25* 19*   < > 29*   < >  --    < > 17*   < >  10*   < > 12*   < > 8*   * 90   < > 134*   < >  --    < > 100*   < > 92   < > 110*   < > 138*   A1C  --   --   --   --   --   --   --   --   --   --   --  5.2   < >  --    CHELSEY 8.5* 8.6*   < > 7.9*   < >  --    < > 8.9   < > 8.3*   < > 9.5   < > 8.0*   PHOS 3.5 2.6   < >  --    < >  --    < > 2.3*   < >  --   --   --    < > 6.7*   MAG  --  1.5*  --   --   --   --   --   --    < >  --    < > 1.8   < > 2.0   LACT  --   --   --  0.9  --  0.7  --   --    < > 1.1   < >  --    < >  --    PCAL  --   --   --   --   --   --   --   --   --  0.70*  --   --    < >  --    FGTL  --   --   --   --   --   --   --  35  --   --   --   --    < >  --    CKT  --   --   --   --   --   --   --  69  --   --   --   --   --  70    < > = values in this interval not displayed.       Hepatic Studies    Recent Labs   Lab Test 11/02/23  0734 11/01/23  0722 10/25/23  1356 10/24/23  1033 07/11/23  0952 05/26/23  0828 05/27/21  1205 05/01/21  0654 01/27/21  0414 01/26/21  0425 01/24/21  1458 11/19/19  1453 11/11/19  1131   BILITOTAL 0.6 0.7   < > 0.9   < >  --    < >  --    < > 0.8 1.2   < > 0.4   00195  --   --   --   --   --  0.5   < >  --    < >  --   --    < >  --    DBIL  --   --   --  0.50*   < >  --    < >  --    < > 0.6*  --    < >  --    ALKPHOS 153* 131*   < > 306*   < >  --    < >  --    < > 184* 244*   < > 316*   09215  --   --   --   --   --  259*   < >  --    < >  --   --    < >  --    PROTTOTAL 5.8* 5.8*   < > 7.4   < >  --    < > 7.2   < > 6.0*  6.0* 6.1*   < > 7.2   06509  --   --   --   --   --  7.1   < >  --    < >  --   --    < >  --    ALBUMIN 3.1* 3.0*   < > 3.6   < >  --    < >  --    < > 2.0* 2.0*   < > 3.0*   42883  --   --   --   --   --  3.2*   < >  --    < >  --   --    < >  --    AST 18 14   < > 39   < >  --    < >  --    < > 11 31   < > 30   04402  --   --   --   --   --  31   < >  --    < >  --   --    < >  --    ALT 20 19   < > 31   < >  --    < >  --    < > 30 35   < > 27   14809  --   --   --   --   --  48    < >  --    < >  --   --    < >  --    LDH  --   --   --   --   --   --   --  164  --  187  --   --   --    GGT  --   --   --   --   --   --   --   --   --   --   --   --  510*   SALAS  --   --   --   --   --   --   --   --   --   --  <10*  --   --     < > = values in this interval not displayed.       Pancreatitis testing    Recent Labs   Lab Test 10/25/23  1356 05/26/22  0750 12/31/19  1033 10/24/19  2032 10/21/19  1451 10/06/19  1444 03/04/18  0353 02/19/18  0759   AMYLASE  --   --   --   --   --   --   --  58   LIPASE 24  --   --  212 119 172   < >  --    TRIG  --  155*   < >  --   --   --    < > 115    < > = values in this interval not displayed.       Gout Labs    No lab results found.    Hematology Studies   Recent Labs   Lab Test 11/02/23  0734 11/01/23  0722 10/31/23  0606 10/30/23  1706 10/28/23  0725 10/27/23  0701 10/17/23  1011 10/11/23  0806   WBC 5.7 1.6*  1.6* 2.1*  2.1* 3.1*   < > 7.6   < >  --    35372  --   --   --   --   --   --   --  3.7*  3.7*   ANEU 5.0 1.0*  --   --    < >  --   --   --    ANEUTAUTO  --   --  1.4*  --   --  7.2   < >  --    ALYM 0.4* 0.4*  --   --    < >  --   --   --    ALYMPAUTO  --   --  0.4*  --   --  0.1*   < >  --    SHERRI 0.2 0.2  --   --    < >  --   --   --    AMONOAUTO  --   --  0.3  --   --  0.1   < >  --    AEOS 0.0 0.1  --   --    < >  --   --   --    AEOSAUTO  --   --  0.1  --   --  0.1   < >  --    ABSBASO  --   --  0.0  --   --  0.0   < >  --    HGB 7.8* 8.0* 7.6* 7.9*   < > 9.3*   < >  --    86088  --   --   --   --   --   --   --  11.4*  11.4*   HCT 24.8* 25.3* 23.8* 24.6*   < > 29.4*   < >  --     143* 138* 152   < > 149*   < >  --    34158  --   --   --   --   --   --   --  167  167    < > = values in this interval not displayed.       Clotting Studies    Recent Labs   Lab Test 10/24/23  1033 08/16/23  0827 05/26/22  0750 09/15/21  0915 02/19/21  0458 02/12/21  0315   INR 0.99 0.9  0.9 0.96 0.96   < >  --    PTT  --   --   --   --   --  28    <  "> = values in this interval not displayed.       Iron Testing    Recent Labs   Lab Test 11/02/23  0734 02/12/21  0315 02/11/21  0645 02/04/21  0310 02/03/21  0415 12/14/18  0951 12/13/18  1033 09/04/18  1052 08/20/18  0553 08/02/18  1100 08/01/18  0921   IRON  --   --   --   --  51  --  16*  --   --   --  93   FEB  --   --   --   --  143*  --  221*  --   --   --  248   IRONSAT  --   --   --   --  36  --  7*  --   --   --  37   YOLA  --   --   --   --   --   --  302*  --   --   --  571*   *   < > 92   < > 91   < > 107*   < >  --    < > 101*   FOLIC  --   --   --   --   --   --   --   --  43.4  --   --    B12  --   --   --   --   --   --   --   --   --   --  1,753*   HAPT  --   --   --   --   --   --  296*  --   --   --   --    RETP  --   --  2.9*  --   --    < > 4.6*  --   --    < >  --    RETICABSCT  --   --  72.4  --   --    < > 94.2  --   --    < >  --     < > = values in this interval not displayed.       Markers  No lab results found.    Invalid input(s): \"FETOPROTEIN\", \"SERUM\", \"AFP\"    Autoimmune Testing    Recent Labs   Lab Test 10/28/23  1737 10/28/23  0725 10/27/23  0701 05/16/18  1006 02/22/18  1800   RHF  --   --  22*  --   --    DNA  --  1.0  --   --   --    Y1MEXJH 76*  --   --  113  --    O9OXANG  --   --  15  --   --    RNPIGG Negative Negative  --  0.2  --    SMIGG Negative  --   --  <0.2  --    SSAIGG Negative  --   --  2.8*  --    SSBIGG Negative  --   --  7.3*  --    SCLIGG  --  Negative  --  <0.2 <0.2       Arterial Blood Gas Testing    Recent Labs   Lab Test 10/17/23  1035 02/10/21  2000 02/10/21  1555 02/09/21  1225 02/09/21  0403 02/08/21  1200   PH  --  7.41 7.36 7.38 7.42 7.40   PCO2  --  42 46* 49* 44 47*   PO2  --  90 105 74* 116* 72*   HCO3  --  26 26 29* 28 29*   O2PER 21.0 50 50 60 60.0 60        Thyroid Studies     Recent Labs   Lab Test 07/11/23  0952 08/01/18  0921 02/19/18  0759   TSH 1.23 1.80 3.07       Urine Studies     Recent Labs   Lab Test 01/24/21  1729 10/21/19  2240 " "09/12/19  0125 08/07/19  1512 03/04/18  1425   URINEPH 5.0 5.0 7.5* 7.0 5.5   NITRITE Negative Negative Negative Negative Negative   LEUKEST Moderate* Large* Negative Trace* Negative   WBCU 34* 115* 3 19* 5   UWBCLM Present*  --   --   --   --        Medication levels    Recent Labs   Lab Test 11/01/23  0722 10/28/23  0725 10/26/23  0606 03/31/20  1131 03/19/20  1032   VANCOMYCIN  --   --  18.6   < >  --    VCON  --   --   --   --  1.4   TACROL 7.6   < >  --    < > 10.3    < > = values in this interval not displayed.       CSF testing   No lab results found.    Invalid input(s): \"CADAM\", \"EVPCR\", \"ENTPCR\", \"ENTEROVIRUS\"    Body fluid stats    Recent Labs   Lab Test 11/01/23  0920 08/17/23  1144 08/17/23  1137 02/11/22  1308 02/11/22  1308 04/30/21  1937 04/29/21  1315 02/03/21  0941 01/25/21  1347 01/24/21  1629 02/20/20  0800 10/22/19  1430 01/17/19  1207 12/14/18  1054   FTYP  --   --   --   --   --  Pleural fluid  --   --  Pleural fluid Bronchoalveolar Lavage   < > Ascites   < > Bronchial washing   FCOL Colorless Pink* Colorless   < > Colorless Slightly Bloody  --   --  Yellow Pink   < > Yellow   < > [place]   FAPR Clear Hazy* Hazy*   < > Cloudy* Cloudy  --   --  Cloudy Slightly Cloudy   < > Clear   < > Turbid   FRBC  --   --   --   --   --   --   --   --   --   --   --   --   --  << Do Not Report >>   FWBC 49 267 236   < > 63 9460  --   --  660 355   < > 44   < > 9520   FNEU 5 7 2   < >  --   --   --   --  87 81   < > 4   < > 94   FLYM 6 4 4   < > 12  --   --   --   --  5   < > 49  --  1   FMONO 84 78 88   < >  --   --   --   --  13  --    < >  --    < > 4   FBAS  --  1  --   --   --   --   --   --   --   --    < >  --   --   --    FOTH  --   --   --   --  88 0  --   --   --  11  --  47   < >  --    FALB  --   --   --   --   --   --   --   --   --   --   --  0.8  --   --    FTP  --   --   --   --   --  3.3  --   --  3.4  --   --  1.7  --   --    GS  --   --   --   --   --   --  No organisms seen  Rare  WBC'S " seen  predominantly PMN's     < > No organisms seen  Moderate  WBC'S seen  predominantly mononuclear cells   >25 PMNs/low power field  No organisms seen   < > No organisms seen  Few  WBC'S seen  predominantly mononuclear cells    Quantification of host cells and microbiological organisms was done on a cytocentrifuged   preparation.     < > >25 PMNs/low power field  Many  Gram negative diplococci  *  Moderate  Gram positive cocci  *    < > = values in this interval not displayed.       Microbiology:  Fungal testing  Recent Labs   Lab Test 10/27/23  0701 10/26/23  1318 10/17/23  1105 08/17/23  1144 10/07/19  1544 09/14/19  1625   FGTL 35  --   --   --    < > 122   FGTLI Negative  --   --   --    < > Positive*   PJRDFA  --   --  Not Detected Not Detected   < >  --    ASPGAI  --  0.07  --  0.05   < > 1.08   ASPAG  --   --   --  Negative   < >  --    ASPGAA  --  Negative  --   --    < > Positive*   CIABB  --   --   --   --   --  <1:2    < > = values in this interval not displayed.       Last Culture results   S Pneumoniae Antigen   Date Value Ref Range Status   01/24/2021   Final    Negative, no Streptococcus pneumoniae antigen detected by immunochromatographic membrane   assay. A negative Streptococcus pneumoniae antigen result does not rule out infection with   Streptococcus pneumoniae.       P. jirovecii By PCR   Date Value Ref Range Status   10/17/2023 Not Detected  Final     Comment:     NOT DETECTED - A negative result does not rule out the   presence of PCR inhibitors in the patient specimen or   assay specific nucleic acid in concentrations below the   level of detection by the assay.    This test was developed and its performance characteristics   determined by [facility]. It has not been cleared or   approved by the US Food and Drug Administration. This test   was performed in a CLIA certified laboratory and is   intended for clinical purposes.  Performed By: [facility]  [address]  [place] [address]  : Rogelio Llanos MD, PhD  CLIA Number: [id number]   08/17/2023 Not Detected  Final     Comment:     NOT DETECTED - A negative result does not rule out the   presence of PCR inhibitors in the patient specimen or   assay specific nucleic acid in concentrations below the   level of detection by the assay.    This test was developed and its performance characteristics   determined by [facility]. It has not been cleared or   approved by the US Food and Drug Administration. This test   was performed in a CLIA certified laboratory and is   intended for clinical purposes.  Performed By: [facility]  [address] [place] [address]  : Rogelio Llanos MD, PhD  CLIA Number: [id number]   08/17/2023 Not Detected  Final     Comment:     NOT DETECTED - A negative result does not rule out the   presence of PCR inhibitors in the patient specimen or   assay specific nucleic acid in concentrations below the   level of detection by the assay.    This test was developed and its performance characteristics   determined by [facility]. It has not been cleared or   approved by the [place] Food and Drug Administration. This test   was performed in a CLIA certified laboratory and is   intended for clinical purposes.  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  Results should be used to aid in the diagnosis of PCP  pneumonia and must be interpreted in the context of host  risk factors, clinical presentation, and radiographic  imaging.  TEST INFORMATION: Pneumocystis jirovecii Detection by PCR  Test developed and characteristics determined by [facility]. See Compliance Statement B: [email or web address]  Performed by [facility],  [address] [facility] [place] [address] [phone]  [email or web address], Carri Red MD, Lab. Director       Culture   Date Value Ref Range Status   10/26/2023   Final    >10 Squamous epithelial cells/low power field indicates oral contamination. Please recollect.   10/25/2023 No Growth  Final   10/25/2023 No Growth  Final   10/17/2023 1+ Actinomyces species (A)  Final     Comment:     Susceptibilities not routinely done, refer to antibiogram to view typical susceptibility profiles  This organism is part of normal alo, but on occasion may be a true pathogen. Clinical correlation must be applied to interpreting this result.   10/17/2023 1+ Normal alo  Final   10/17/2023 No growth after 15 days  Preliminary   10/17/2023 No growth after 15 days  Preliminary   08/17/2023 No Growth  Final   08/17/2023 No Growth  Final   08/17/2023 1+ Normal alo  Final   08/17/2023 1+ Stenotrophomonas maltophilia (A)  Final     Comment:     Susceptibilities done on previous cultures   08/17/2023 No Growth  Final   08/17/2023 No Growth  Final   08/17/2023 1+ Normal alo  Final   08/17/2023 1+ Stenotrophomonas maltophilia (A)  Final   02/11/2022 No Actinomyces isolated  Final   02/11/2022 No Growth  Final   02/11/2022 No Growth  Final   09/23/2021   Final    >10 Squamous epithelial cells/low power field indicates oral contamination. Please recollect.   09/23/2021 No Growth  Final     Culture Micro   Date Value Ref Range Status   05/01/2021   Final    Quantity not sufficient  Called to Maxim Norman at 1236 5/1/21.    mlb     05/01/2021 Culture negative after 30 days  Final   04/29/2021 No anaerobes isolated  Final   04/29/2021 No growth  Final   02/19/2021 Culture negative for acid fast bacilli  Final   02/19/2021   Final    Assayed at [facility], [address] [place] [address] [phone]   02/19/2021 Culture negative after 4 weeks  Final   02/19/2021 No growth after 4 weeks  Final   02/19/2021 Moderate  "growth  Staphylococcus epidermidis   (A)  Final   02/09/2021 No growth  Final         Last checks of Clostridioides difficile testing  Recent Labs   Lab Test 10/31/23  0732 02/13/21  0530 12/04/18  1500 08/02/18  2253   CDBPCT Negative Negative Negative Positive*       Syphilis Testing    No results found for: \"TREPT\", \"TREPAB\", \"RPR\", \"TPPAT\", \"CVD\"    Quantiferon testing   Recent Labs   Lab Test 11/02/23  0734 11/01/23  0722 09/20/21  1903 05/01/21  1102 02/22/21  0527 02/19/21  0853 02/11/21  0645 01/25/21  1347 01/24/21  1629 10/28/19  0553 10/25/19  1628 10/06/19  1444 09/14/19  1625 02/21/18  1439 02/19/18  0759   TBRES  --   --   --   --   --   --   --   --   --   --  Negative  --  Negative  --   --    TBRSLT  --   --   --   --   --   --   --   --   --   --   --   --   --   --  Negative   TBAGN  --   --   --   --   --   --   --   --   --   --   --   --   --   --  0.00   LYMPH 7 22   < >  --    < >  --    < >  --   --    < >  --    < >  --    < > 8.4   AFBSMS  --   --   --  Quantity not sufficient  Called to Dr Maxim Norman at 1236 5/1/21.    mlb    --  Negative for acid fast bacteria  Less than 5ml of specimen received.  A minimum of 5 mL of sputum or fluid is recommended for recovery of acid fast bacilli   (AFB).  Volumes less than 5 mL are suboptimal and may compromise recovery of AFB from   culture.    Assayed at [facility] [address] [place] [id number] [phone]  --  Negative for acid fast bacteria  Less than 5ml of specimen received.  A minimum of 5 mL of sputum or fluid is recommended for recovery of acid fast bacilli   (AFB).  Volumes less than 5 mL are suboptimal and may compromise recovery of AFB from   culture.    Assayed at [facility] [address] [place] [address] [phone] Negative for acid fast bacteria  Less than 5ml of specimen received.  A minimum of 5 mL of sputum or fluid is recommended for recovery of acid fast bacilli   (AFB).  Volumes less than 5 " "mL are suboptimal and may compromise recovery of AFB from   culture.    Assayed at [facility] [address] [place] [address] [phone]   < >  --    < >  --    < >  --     < > = values in this interval not displayed.       Infection Studies to assess Diarrhea  No lab results found.    Invalid input(s): \"BIDYD\"    Virology:  Coronavirus-19 testing    Recent Labs   Lab Test 07/11/23  1132 10/12/22  1528 09/26/22  0956 05/23/22  1013 04/01/22  1257 02/07/22  1301 01/31/22  1309 01/24/22  1324 09/20/21  1859 09/15/21  0623 06/07/21  1310 05/02/21  0715 04/26/21  1151 04/08/21  0723   OTN95RD  --   --   --   --   --   --   --   --   --   --   --  Negative  --   --    [id number]  --   --   --   --   --   --   --   --   --   --   --  Not Applicable  --   --    [id number] Negative  --   --   --   --   --   --   --  Negative Negative  --   --   --  Test received-See reflex to IDDL test SARS CoV2 (COVID-19) Virus RT-PCR  NEGATIVE   [id number]  --   --   --   --   --   --   --   --   --   --   --   --   --  Nasopharyngeal   [id number]  --   --   --   --   --   --   --   --   --   --   --   --   --  Nasopharyngeal   COVIDPCREXT  --  Negative Negative Undetected   < > Undetected Undetected   < >  --   --    < >  --   --   --    SOUREXT  --   --   --  Nasopharynx  --  Nasopharynx Nasopharynx   < >  --   --    < >  --    < >  --    [id number]  --   --   --  Undetected  --  Undetected Undetected   < >  --   --    < >  --    < >  --     < > = values in this interval not displayed.       Respiratory virus (non-coronavirus-19) testing    Recent Labs   Lab Test 11/01/23  0920 10/27/23  0449 08/17/23  1144 01/24/21  1822 01/24/21  1629 12/23/20  1102 02/27/18  1016 02/22/18  0900   RVSPEC  --   --   --   --  Bronchial Bronchial   < >  --    AFLU  --   --   --   --   --   --   --  Duplicate request*   IFLUA Not Detected Not Detected Not Detected   < > Negative Negative   < >  --    INFZA  --   --   --   --   --   --   " --  Negative   FLUAH1 Not Detected Not Detected Not Detected   < > Negative Negative   < >  --    [id number] Not Detected Not Detected Not Detected   < > Negative Negative   < >  --    FLUAH3 Not Detected Not Detected Not Detected   < > Negative Negative   < >  --    BFLU  --   --   --   --   --   --   --  Duplicate request*   IFLUB Not Detected Not Detected Not Detected   < > Negative Negative   < >  --    INFZB  --   --   --   --   --   --   --  Negative   PIV1 Not Detected Not Detected Not Detected   < > Negative Negative   < >  --    PIV2 Not Detected Not Detected Not Detected   < > Negative Negative   < >  --    PIV3 Not Detected Not Detected Not Detected   < > Negative Negative   < >  --    PIV4 Not Detected Not Detected Not Detected   < >  --   --    < >  --    IRSV  --   --   --   --   --   --   --  Negative   HRVS  --   --   --   --  Negative Negative   < >  --    RSVA Not Detected Not Detected Not Detected   < > Negative Negative   < >  --    RSVB Not Detected Not Detected Not Detected   < > Negative Negative   < >  --    HMPV Not Detected Not Detected Not Detected   < > Negative Negative   < >  --    ADVBE  --   --   --   --  Negative Negative   < >  --    ADVC  --   --   --   --  Negative Negative   < >  --    ADENOV Not Detected Not Detected Not Detected   < >  --   --    < >  --    CORONA Not Detected Not Detected Not Detected   < >  --   --    < >  --     < > = values in this interval not displayed.       CMV viral loads    Recent Labs   Lab Test 10/31/23  0606 10/24/23  1033 10/17/23  1011 10/11/23  0806 10/04/23  0810 09/07/23  0710 08/17/23  1144 08/17/23  1137 08/08/23  0828 08/08/23  0828 07/11/23  0952 05/26/23  0828 04/06/23  0725 03/08/23  0848 02/09/23  1034 11/18/22  0928 09/27/22  1012 06/27/22  1013 05/26/22  0750 03/03/22  1054 02/11/22  1308 02/10/22  0919 11/10/21  1106 10/12/21  1251 03/15/21  0904 02/25/21  0542   CMVQNT <35*  --   --   --   --   --   --   --   --   --  Not Detected  --   "Not Detected  --  <137*  --  Not Detected  --  <137*  --  Not Detected Not Detected  --  Not Detected   < > CMV DNA Not Detected   CMVRESINST  --  103*  80* 75*  --   --  521*  --   --   --  46*  --   --   --   --   --   --   --   --   --   --   --   --   --   --   --   --    CSPEC  --   --   --   --   --   --   --   --   --   --   --   --   --   --   --   --   --   --   --   --   --   --   --   --   --  EDTA PLASMA   CMVLOG <1.5 2.0  1.9 1.9  --   --  2.7  --   --    < > 1.7  --   --   --   --  <2.1  --   --   --  <2.1   < >  --   --   --   --   --  Not Calculated   18065  --   --   --  2,680*  2,680*   < >  --   --   --   --   --   --    < >  --    < >  --    < >  --    < >  --    < >  --   --    < >  --    < >  --    CMVQAL  --   --   --   --   --   --  Not Detected Not Detected  --   --   --   --   --   --   --   --   --   --   --   --   --   --   --   --   --   --     < > = values in this interval not displayed.       CMV resistance testing  Recent Labs   Lab Test 08/03/18  0624   CMVCID Sensitive   CMVFOS Sensitive   CMVGAN Sensitive       No results found for: \"H6RES\"    EBV DNA Copies/mL   Date Value Ref Range Status   10/28/2023 10,175 (H) <=0 copies/mL Final   08/08/2023 42,461 (H) <=0 copies/mL Final   07/11/2023 49,591 (H) <=0 copies/mL Final   04/06/2023 43,506 (H) <=0 copies/mL Final   03/01/2023 83,207 (H) <=0 copies/mL Final   02/09/2023 114,508 (H) <=0 copies/mL Final   09/27/2022 21,749 (H) <=0 copies/mL Final   05/26/2022 77,030 (H) <=0 copies/mL Final   02/10/2022 135,117 (H) <=0 copies/mL Final   10/12/2021 969,411 (H) <=0 copies/mL Final   09/15/2021 45,122 (H) <=0 copies/mL Final   05/01/2021 38,186 (A) EBVNEG^EBV DNA Not Detected [Copies]/mL Final   02/22/2021 75,709 (A) EBVNEG^EBV DNA Not Detected [Copies]/mL Final   01/25/2021 5,619 (A) EBVNEG^EBV DNA Not Detected [Copies]/mL Final   12/09/2020 10,686 (A) EBVNEG^EBV DNA Not Detected [Copies]/mL Final   09/09/2020 2,935 (A) EBVNEG^EBV DNA " "Not Detected [Copies]/mL Final   03/09/2020 8,918 (A) EBVNEG^EBV DNA Not Detected [Copies]/mL Final     EBV DNA Quant (External)   Date Value Ref Range Status   10/11/2023 <35 (A) Undetected IU/mL Final   05/26/2023 <35 (A) Undetected IU/mL Final   03/08/2023 <35 (A) undetected IU/ml Final   03/08/2023 <35 (A) Undetected IU/mL Final   01/13/2023 Undetected Undetected IU/mL Final     EBV Interp DNA Quant (External)   Date Value Ref Range Status   03/08/2023   Final    EBV DNA is detected, but level present is <35 IU/mL (<1.54 log IU/mL). This assay cannot accurately quantify EBV DNA below this level.       BK Virus Result   Date Value Ref Range Status   08/07/2019 BK Virus DNA Not Detected BKNEG^BK Virus DNA Not Detected copies/mL Final       Parvovirus Testing  No lab results found.    Invalid input(s): \"PRVRES\"    Adenovirus Testing  No lab results found.    Invalid input(s): \"ADENAB\", \"ADENOVIRUS\", \"ADQT\"    Hepatitis B Testing     Recent Labs   Lab Test 10/26/23  1233 09/21/21  1055 10/25/19  1800 06/05/19  1156 06/05/18  1029 03/01/18  0356   AUSAB 0.67 0.50   < >  --   --  0.41   HBCAB  --   --   --  Nonreactive  --  Nonreactive   HEPBANG Nonreactive Nonreactive   < > Nonreactive   < > Nonreactive    < > = values in this interval not displayed.        Hepatitis C Antibody   Date Value Ref Range Status   06/05/2019 Nonreactive NR^Nonreactive Final     Comment:     Assay performance characteristics have not been established for newborns,   infants, and children     02/19/2018 Nonreactive NR^Nonreactive Final     Comment:     Assay performance characteristics have not been established for newborns,   infants, and children         CMV Antibody IgG   Date Value Ref Range Status   03/01/2018 Canceled, Test credited 0.0 - 0.8 AI Final     Comment:     Test reordered as correct code  SEE CMV QUANTITATIVE PCR     03/01/2018 >8.0 (H) 0.0 - 0.8 AI Final     Comment:     Positive  Antibody index (AI) values reflect " qualitative changes in antibody   concentration that cannot be directly associated with clinical condition or   disease state.     02/19/2018 >8.0 (H) 0.0 - 0.8 AI Final     Comment:     Positive  Antibody index (AI) values reflect qualitative changes in antibody   concentration that cannot be directly associated with clinical condition or   disease state.       Varicella Zoster Virus Antibody IgG   Date Value Ref Range Status   02/19/2018 3.8 (H) 0.0 - 0.8 AI Final     Comment:     Positive, suggests prev. exposure and probable immunity  Antibody index (AI) values reflect qualitative changes in antibody   concentration that cannot be directly associated with clinical condition or   disease state.       EBV Capsid Antibody IgG   Date Value Ref Range Status   03/01/2018 >8.0 (H) 0.0 - 0.8 AI Final     Comment:     Positive, suggests recent or past exposure  Antibody index (AI) values reflect qualitative changes in antibody   concentration that cannot be directly associated with clinical condition or   disease state.     02/19/2018 >8.0 (H) 0.0 - 0.8 AI Final     Comment:     Positive, suggests recent or past exposure  Antibody index (AI) values reflect qualitative changes in antibody   concentration that cannot be directly associated with clinical condition or   disease state.       Toxoplasma Antibody IgG   Date Value Ref Range Status   02/19/2018 <3.0 0.0 - 7.1 IU/mL Final     Comment:     Negative- Absence of detectable Toxoplasma gondii IgG antibodies. A negative   result does not rule out acute infection.  The magnitude of the measured result is not indicative of the amount of   antibody present. The concentrations of anti-Toxoplasma gondii IgG in a given   specimen determined with assays from different manufacturers can vary due to   differences in assay methods and reagent specificity.       Herpes Simplex Virus Type 1 IgG   Date Value Ref Range Status   03/01/2018 >8.0 (H) 0.0 - 0.8 AI Final     Comment:      Positive.  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  Antibody index (AI) values reflect qualitative changes in antibody   concentration that cannot be directly associated with clinical condition or   disease state.     02/19/2018 >8.0 (H) 0.0 - 0.8 AI Final     Comment:     Positive.  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  Antibody index (AI) values reflect qualitative changes in antibody   concentration that cannot be directly associated with clinical condition or   disease state.       Herpes Simplex Virus Type 2 IgG   Date Value Ref Range Status   03/01/2018 <0.2 0.0 - 0.8 AI Final     Comment:     No HSV-2 IgG antibodies detected.  Antibody index (AI) values reflect qualitative changes in antibody   concentration that cannot be directly associated with clinical condition or   disease state.     02/19/2018 <0.2 0.0 - 0.8 AI Final     Comment:     No HSV-2 IgG antibodies detected.  Antibody index (AI) values reflect qualitative changes in antibody   concentration that cannot be directly associated with clinical condition or   disease state.         Imaging:  Recent Results (from the past 48 hour(s))   MR Chest w/o & w Contrast    Narrative    MRI chest with and without contrast    Indication persistent marked left upper extremity swelling    COMPARISON: 10/10/2019    FINDINGS: Images obtained before and after intravenous administration  of 6 mL Gadavist contrast.  Loculated left pleural effusion. Trace right layering pleural  effusion. Median sternotomy artifact.  Cord and CSF signals appear unremarkable. Aorta normal size. Main  pulmonary artery mildly enlarged at 3.4 cm. The no obvious brachial  plexus mass on either side. Fatty muscle atrophy throughout. No  enlarged lymph nodes in the chest. Benign morphologically appearing  bilateral axillary lymph nodes with fatty marguerite.   The included upper abdomen shows pericholecystic fluid with a  nondistended gallbladder. No pericardial effusion. No streaky  perihilar opacities  relatively similar to chest CT of 10/10/2019  consistent with chronic probable inflammatory/infectious process.  Postcontrast imaging shows normal appearance of the left subclavian  artery..  Prominent lymph nodes possibly noted in the left supraclavicular  space. Density near the left paravertebral lobe pleural space with rim  hyperdensity on recent CT 10/27/2023 showing internal signal on DWI an  lack of enhancement. This may represent a healed infection. History of  prior bilateral lung transplant.  Subcutaneous edema also in the left breast tissues and subcutaneous  left chest wall compared to the right.      Impression    IMPRESSION:  1. Mildly prominent left supraclavicular lymph nodes. Grossly similar  to recent CT scan of the chest 4 days ago.  2. Diffuse muscle atrophy.  3. Abnormal density anterior posterior left lower rib in the left  pleural space which was peripherally calcified on the recent CT. This  may be old inflammatory/infectious process.  4. Loculated left pleural effusion with small free-flowing right  pleural effusion. 9 skin thickening of left breast.  5. Skin thickening of left breast. Please correlate with dedicated  breast imaging as appropriate. Edema in the left breast tissues as  well as left chest wall. Possible inflammatory/infectious process.  6. Bilateral lung transplant history.  7. Nondistended gallbladder with small amount of pericholecystic fluid  in patient with reported previous history cholelithiasis.    TERRI ISSA MD         SYSTEM ID:  [id number]   US Ext Arterial Venous Dialys Acs Graft    Narrative    ULTRASOUND EXTREMITY ARTERIAL VENOUS DIALYSIS ACCESS GRAFT 11/1/2023  11:31 AM    CLINICAL HISTORY: Dialysis fistula on LUE, having LUE swelling no  clear etiology. Left axillary to cephalic graft. Fistulogram and  declot 3/2/2021.    COMPARISONS: Fistulogram and declot 3/2/2021.    REFERRING PROVIDER: GISSEL SONG    TECHNIQUE: Left arm dialysis graft evaluated with  grayscale, color  Doppler, and spectral pulsed wave Doppler ultrasound. Flow volumes  obtained.    FINDINGS: LEFT:  Subclavian artery: 119/36 cm/s    Axillary artery, above anastomosis: 181/67 cm/s  Axillary artery, above anastomosis: 6.8 mm, 1240 cc/min Vol Flow    Brachial artery, below anastomosis: 102/0 cm/s, antegrade    Arterial anastomosis: 3.6 mm, 420 cm/s, 2.32 velocity ratio    Graft, distal to anastomosis: 5.9 mm, 561 cm/s  Graft, distal to anastomosis: 6.1mm, 362 cm/s  Graft, upper arm: 6.4 mm, 244 cm/s  Graft, upper arm: 6.1 mm, 1207 cc/min Vol Flow  Graft, upper arm: 15.6 mm, 112 cm/s, mural thrombus causes minimal  narrowing  Graft, mid arm: 12.2 mm, 128 cm/s  Graft, above antecubital fossa: 8.2 mm, 127/63 cm/s  Graft, above antecubital fossa: 7.6 mm, 1267 cc/min Vol Flow  Graft, above venous anastomosis: 7.4 mm, 142 cm/s    Venous anastomosis: 5.3 mm, 125 cm/s    Cephalic vein, antecubital fossa, above anastomosis: 13.3 mm, 169 cm/s  Cephalic vein, antecubital fossa: 7.2 mm, 179 cm/s  Cephalic vein, antecubital fossa: 6.0 mm, 1085 cc/min Vol Flow  Cephalic vein, mid arm: 8.0 mm, 117 cm/s  Cephalic vein, upper arm: 8.6 mm, 93 cm/s  Cephalic vein, shoulder: 7.9 mm, 126 cm/s  Cephalic vein, peripheral to confluence: 7.2 mm, 154 cm/s  Cephalic vein, nearing confluence: 232 cm/s    Cephalic subclavian confluence not well visualized.    Cephalic subclavian confluence: 5.1 mm, 153 cm/s, 194 cm/s    Subclavian vein, medial to confluence: 228 / -102 cm/s, bidirectional    Subclavian vein, mid: 59 cm/s, retrograde  Axillary vein: 76 cm/s, retrograde    Innominate vein: not visualized    Internal jugular vein: occluded      Impression    IMPRESSION: Left axillary to cephalic graft.  1. No arterial inflow stenosis demonstrated.    2. Arterial anastomosis patent. 3.6 mm, 420 cm/s, 2.32 velocity ratio.    3. Graft patent. 15.6 mm pseudoaneurysm in the upper arm with mural  thrombus causing minimal narrowing. 12.2  mm pseudoaneurysm in the mid  arm. Flow volumes in the graft well exceed 600 cc/min (1207 cc/min in  the upper arm and 1085 cc/min above the antecubital fossa.    4. Venous anastomosis patent. 5.1 mm, 153 cm/s, 194 cm/s    5. Outflow cephalic vein patent. Pseudoaneurysmal or aneurysmal  dilation in the antecubital fossa to 13.3 mm diameter. Flow volumes  exceed 600 cm/s.    6. Fistulogram demonstrated two cephalic subclavian confluences. Only  1 demonstrated in this study. [place] not well visualized.    7. Central venous stenosis or occlusion suggested. Fistulogram could  be performed for further evaluation and possible treatment. Innominate  vein not visualized. Bidirectional flow in the subclavian vein medial  to the cephalic subclavian confluence. Retrograde flow in subclavian  vein lateral to the confluence and in the axillary vein.    I have personally reviewed the examination and initial interpretation  and I agree with the findings.    OBIE DAWSON MD         SYSTEM ID:  [id number]   CT Chest w Contrast    Narrative    EXAM: CT CHEST W CONTRAST 11/1/2023 9:14 PM    HISTORY: 62 years Female Evaluate for possible clot/mass/lymphatic  obstruction/inflammatory cause of LUE swelling, please include LUE    COMPARISON: CT chest 10/27/2023, MR chest 10/31/2023.    TECHNIQUE: Helical CT imaging of the chest was obtained with contrast.  Multiplanar post-processed and MIP reformats were obtained reviewed.  The left upper extremity was additionally included within the  field-of-view.    FINDINGS:  LINES/TUBES: None.    MEDIASTINUM: Prominent left inferior cervical lymph nodes. Mildly  enlarged bilateral pulmonary window lymph node measuring 1.0 cm,  subcarinal node measuring 1.1 cm an additional subcentimeter  paratracheal lymph nodes. Normal size/configuration of the great  vessels. Normal heart size. No pericardial effusion. Atherosclerotic  calcifications of the aortic arch. Patent vascular anastomoses.    LUNG:  Postsurgical change of bilateral lung transplantation. The  bronchial anastomoses are patent. There is unchanged mild narrowing of  the right mainstem bronchus just distal to the anastomosis. Similar  appearance of patchy peribronchial vascular opacities predominantly in  the right lower lobe, and to a lesser degree the left upper lobe, left  lower lobe, and right middle lobe. There is decreased septal  thickening in the right upper lobe.     PLEURA: No pneumothorax. Small loculated left pleural effusion. Trace  right pleural effusion. Unchanged peripherally calcified 2.6 x 1.7 cm  soft tissue density within the inferior medial left pleural space.    LOWER NECK: Thyroid unremarkable.    UPPER ABDOMEN: Esophagus appears unremarkable. Cholelithiasis with  mildly prominent gallbladder wall and small volume pericholecystic  fluid. Mildly dilated common bile duct measuring up to 1.1 cm. Mild  dilation of the main pancreatic duct without visualized pancreatic  mass.    BONES: No acute osseous abnormality. No suspicious bony lesions.    SOFT TISSUES: Overlying subcutaneous thickening and soft tissue fat  stranding involving the left breast.     Lymph nodes: Ongoing prominent and enlarged axillary lymph nodes. For  example there is a 1.5 x 1.1 cm left axillary lymph node (series 4,  image 80), previously 1.6 x 1.1 cm, as well as a right axillary lymph  node measuring 1.6 x 1.1 cm (series 4, image 64), previously 1.4 x 1.1  cm. Numerous prominent supraclavicular and mediastinal lymph nodes,  unchanged compared to prior.    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  Vasculature appears patent. 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  Soft tissue swelling throughout the left upper extremity.      Impression    IMPRESSION:   1.  The left upper extremity  vasculature, including the arteriovenous  fistula graft, appear patent with known pseudoaneurysms in the upper  and mid arm. Left upper extremity subcutaneous edema. No additional  findings identified in the left upper extremity to explain patient's  symptoms.  2.  Ongoing left breast subcutaneous edema and skin thickening with  adjacent lymphadenopathy, likely infectious/inflammatory.  3.  Unchanged axillary, mediastinal, and supraclavicular  lymphadenopathy.  4.  Cholelithiasis with pericholecystic fluid, mild wall thickening,  and mild dilation of the common bile duct and main pancreatic duct.  Recommend correlation with clinical symptoms. Consider further  evaluation with ultrasound versus MRCP if concern for  cholecystitis/biliary obstruction.  5.  Ongoing but slightly improved pulmonary opacities compared to  10/27/2023. Differential includes pulmonary edema and/or infection.  6.  Stable loculated left pleural effusion and trace right pleural  effusion.    I have personally reviewed the examination and initial interpretation  and I agree with the findings.    SUNITA ADKINS MD         SYSTEM ID:  [id number]   IR Dialysis Fistulogram Left    Narrative    PROCEDURE: Dialysis circuit evaluation and interventions 11/2/2023    Procedural Personnel  Attending physician(s): LLUVIA Rodriguez MD  Fellow physician(s): BRODERICK Urbano MD  Resident physician(s): None  Advanced practice provider(s): None    Pre-procedure diagnosis: Functioning fistulagram, arm swelling  Post-procedure diagnosis: Same  Indication: Arm swelling  Additional clinical history: None    Complications: No immediate complications.      Impression    IMPRESSION:  Fistulagram demonstrates significant recurrent narrowing of the left  innominate vein/medial subclavian vein with significant chest  collaterals. Segment was venoplastied to 10 mm followed by application  of 12 mm drug coated balloon with good angiographic results.     Plan:  The graft can be used for  dialysis immediately.  _______________________________________________________________    PROCEDURE SUMMARY:  - Access of dialysis circuit with ultrasound guidance  - Procedure: Angiogram and central segment angioplasty  - Additional procedure(s): None    PROCEDURE DETAILS:    Pre-procedure  Consent: Informed consent for the procedure including risks, benefits  and alternatives was obtained and time-out was performed prior to the  procedure.  Preparation: The site was prepared and draped using maximal sterile  barrier technique including cutaneous antisepsis.    Anesthesia/sedation  Level of anesthesia/sedation: Moderate sedation (conscious sedation)  Anesthesia/sedation administered by: Independent trained observer  under attending supervision with continuous monitoring of the  patient?s level of consciousness and physiologic status  Total intra-service sedation time (minutes): 38  4mg zofran IV pre procedure, 2 mg versed IV, 100 mcg fentanyl IV?    Initial dialysis circuit evaluation  Dialysis circuit side: Left  Dialysis circuit type: Upper arm loop graft  Initial circuit palpation: Pulse but no thrill    Access (towards outflow)  Local anesthesia was administered. The dialysis circuit was  sonographically evaluated and judged to be patent. Real time  ultrasound was used to visualize needle entry into the dialysis  circuit and a permanent image was stored..   Access technique: Micropuncture set with 21 gauge needle  Sheath size ([place]): 7    Initial angiography  Initial angiography of the dialysis circuit, outflow veins, and  central veins was performed.  Findings: Fistulagram demonstrates significant recurrent narrowing of  the left innominate vein/medial subclavian vein with significant chest  collaterals.     Angioplasty  Location: Left innominate vein/medial SCV  Angioplasty balloon: 10 mm [place] followed by 12 mm Lutonix  Angioplasty balloon type: Conventional followed by Lutonix    Final  angiography  Findings: Significant caliber improvement of segment and resolution of  chest wall collaterals  Final dialysis circuit palpation: Palpable thrill    Reflux angiogram showed axillary artery anastomosis with no evidence  of stenosis.    Closure (outflow)  The sheath was removed and hemostasis was achieved with an external  device. A sterile dressing was applied.    Radiation Dose  Fluoroscopy time (minutes): 4.1    Reference air kerma (mGy): 6.0   Kerma area product (uGy-m2): 694.87      Additional Details  Additional description of procedure: None  Registry event: V/3/g  Device used: None  Equipment details: None  Unique Device Identifiers: Not available  Specimens removed: None  Estimated blood loss (mL): Less than 10  Standardized report: [email or web address]tion_v3.1    Attestation  Signer name: CLAUDINE ESTRADA MD  I attest that I was present for the key elements of the procedure and  immediately available. I reviewed the stored images and agree with the  report as written.    I have personally reviewed the examination and initial interpretation  and I agree with the findings.    CLAUDINE ESTRADA MD         SYSTEM ID:  [id number]     CT CHEST 10/24/2023                                                                   IMPRESSION: Worsening consolidation bilaterally concerning for  worsening infection. New prominent left upper lobe anterior  pleural-based soft tissue thickening. Recommend follow to clearing.  Neoplasm somewhat less likely given the short interval but cannot be  entirely excluded. Mild air trapping.  Ectasia of the mid ascending thoracic aorta.  Left breast skin thickening and edema in the breast tissues again  present. Rim calcified almost certainly benign density at the left  lung base. Trace pleural effusions bilaterally.  Unchanged osteopenic T5 compression deformity.  Atherosclerosis.    MR Chest 10/25/2023  IMPRESSION:  1. Mildly prominent left  supraclavicular lymph nodes. Grossly similar  to recent CT scan of the chest 4 days ago.  2. Diffuse muscle atrophy.  3. Abnormal density anterior posterior left lower rib in the left  pleural space which was peripherally calcified on the recent CT. This  may be old inflammatory/infectious process.  4. Loculated left pleural effusion with small free-flowing right  pleural effusion. 9 skin thickening of left breast.  5. Skin thickening of left breast. Please correlate with dedicated  breast imaging as appropriate. Edema in the left breast tissues as  well as left chest wall. Possible inflammatory/infectious process.  6. Bilateral lung transplant history.  7. Nondistended gallbladder with small amount of pericholecystic fluid  in patient with reported previous history cholelithiasis.

## 2023-11-02 NOTE — PROGRESS NOTES
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  The patient was admitted on 10/25/2023 with malaise, cough, and dyspnea along with 2 weeks of new hypoxia, CT chest concerning for uncontrolled infection.  Also with worsening LUE swelling and erythema.  IP bronch with dilation .  Currently on 1-3 L O2 via NC.     Today's Recommendations:   - Chest CT yesterday and CXR today as below, repeat CXR every other day (ordered next on )  - IP planning for custom silicone stent, per IP no need for repeat bronch/dilation based on yesterday's chest CT   - Bronch cultures () NGTD  - Augmentin starting today, recommend duration through  (for total ABX course of 2-weeks)  - Tacrolimus level today subtherapeutic but unable to interpret given missed AM dose yesterday, will instead repeat  (ordered) for steady state level  - AZA on HOLD given pancytopenia, will revisit daily  - Prednisone burst/taper through , with chronic dose ordered to resume   - Repeat ImmuKnow in 4 weeks (, not yet ordered)  - Repeat QTc 465 msec today (in setting of resuming azithromycin 10/30)  - Posaconazole level (10/27) subtherapeutic, per txp ID repeat posa trough level tomorrow AM (ordered)  - CMV weekly, next on  (ordered)  - EBV level next on  (ordered)  - US yesterday and fistulogram today as below, further workup of LUE  swelling per primary  - Strongly recommend vascular and general surgery consults     Acute hypoxic respiratory failure:  Right main bronchial stenosis s/p recurrent dilatation:   Concern for pneumonia:  Follows with IP for serial bronchs with dilation.  Progressive malaise, productive cough of clear sputum, and dyspnea for 2 months with occasional wheezing.  New hypoxia at home (2L NC x 2 weeks).  S/p bronch/BAL (8/17) with Steno s/p Levaquin and Bactrim without improvement.  CT chest (10/11, [facility] overread) with increased GGO and nodules t/o RML and KONRAD.  Bronch/BAL (10/17) with RM stenosis dilation (improvement x1 day then return of sx) and Actinomyces sp., started on Augmentin the day PTA.  Admission workup notable for low grade temp & leukocytosis, CT chest (10/24) with worsening consolidation bilaterally concerning for worsening infection, new prominent left upper lobe anterior pleural-based soft tissue thickening, and mild air trapping.  DDx includes pneumonia/infection, stenosis (coarse inspiratory/expiratory wheeze on exam), PE (tachycardia and hypotension, extremity US negative DVT, echo with normal appearing RV although pulmonary HTN noted), worsening CLAD, rejection (DSA pending as below, cannot rule out ACR), cardiac, hypervolemia (missed dialysis 10/25, CXR 10/25 with pulmonary edema).  Significant LUE swelling 10/27, see below.  CT PE (10/27) with no PE, noted decreased edema in the lung with continued small pleural effusions, cardiomegaly, and left breast skin thickening and breast edema.   IP bronch (11/1) with dilation but no stent placement.  No significant improvement in symptoms with current ABX.  Remains on 1-3 L O2 via NC.  Increasingly wheezy 11/2.  - Supplemental O2 to maintain SpO2>92%, wean as able  - Nebs: Xopenex TID & Mucomyst TID, Pulmicort daily PRN, additionally encourage IS and Aerobika  - CXR (11/2) with moderate right and small/moderate left pleural effusions with slightly worsened  bibasilar atelectasis / scattered hazy airspace opacities (personally reviewed), repeat every other day, next on 11/4 (ordered)  - Chest CT (11/1) with stable loculated left pleural effusion and trace right pleural effusion, ongoing pulmonary opacities, and similar level of right main bronchus narrowing (reviewed with Dr. Orona)  - IP planning for custom silicone stent, and per IP no need for repeat bronch/dilation based on chest CT (11/1)   - Bacterial, fungal, AFB, and Nocardia cultures from bronch (11/1) NGTD  - Histo urine Ag, UA/UC ordered pending collection (oliguric)  - ABX: Augmentin (11/2, recommend duration through 11/7 for total ABX course of 2-weeks, s/p IV Zosyn (10/25-11/1));  s/p minocycline (10/25-10/26) and vancomycin (10/25) discontinued per transplant ID, low threshold to resume or revisit ABX regimen with clinical decline, if vancomycin resumed premedicate with antihistamine and slow infusion time given vancomycin-infusion erythrodermic pruritic reaction on 10/25  - Per transplant ID: recommend post-dilation course of Augmentin for ~2 weeks in the future (or alternatively, 1 week out of each month)  - Follow-up as OP 11/21 in pulm clinic with repeat chest CT     S/p bilateral sequential lung transplant (BSLT) for ILD: Pulmonary clinic visit with Ame Chow 10/24 with increased cough and dyspena, occasional wheezing, as above.  PFTs decreased.  DSA negative 8/8.  IgG adequate 979 on 10/27.  DSA (10/27) negative.       Immunosuppression: Recent ImmuKnow 479 on 9/7, suggesting increased risk of rejection, repeat 412 on 10/27, indicating normal immune response.    - Tacrolimus 0.5 mg BID (held then resumed at dec. dose 10/31).  Goal level 8-10.  Level today (11/2) subtherapeutic at 6.8, however unable to interpret given that pt missed her AM dose yesterday (11/1).  Will repeat level on 11/4 (ordered) to obtain steady state level.  - AZA on HOLD as of 11/1 for pancytopenia, will revisit daily  -  Prednisone chronic dose on hold with steroid burst/taper (10/28-11/5 as below), chronic dose scheduled to resume 11/6  - Repeat ImmuKnow in 4 weeks (11/24, not yet ordered)     Prophylaxis:   - Bactrim MWF for PJP ppx     CLAD: PTA Breo (hospital substitute for Advair), Singulair, and azithromycin held 10/27 given prolonged QTc of 512 --> repeat QTc 10/30 improved to 455, azithro resumed 10/30, repeat QTc on 11/2 was 465.       Left-sided Aspergillus empyema: Noted in 2019 s/p needle aspiration with Aspergillus fumigatus on cultures s/p intrapleural amphotericin bead placement.  Posaconazole indefinitely per transplant ID, most recent level 1 on 2/9.  Fungitell (10/27) and A. galactomannan (10/26) negative.  - PTA PO posaconazole, level (10/27) subtherapeutic at 0.4, per transplant ID, repeat posaconazole trough level 11/3 AM (ordered)     Recurrent CMV viremia: Positive at 2,450 on 10/4 and peaked at 2,680 on 10/11, down to  on 10/24.  Repeat level (10/31) <35 log <1.5  - CMV weekly, next on 11/6 (ordered)   - PTA  VGCV induction dosing (dec.11/1 per transplant ID back to 450mg MWF after HD)     EBV viremia: EBV 42k with log 4.6 on 8/8, then <35 on 10/11, now 10k log 4.0 on 10/28.    - Repeat EBV next on 11/13 (ordered)     Other relevant problems being managed by the primary team:     Pancytopenia:  Etiology unclear, could be due to AZA and/or worsening leukopenia (11/1) could be due to increased dose of VGCV per transplant ID.  VGCV dose decreased back to 450mg MWF on 11/1 per transplant ID.  S/p Neupogen (11/1).  WBC improving, 1.6 on 11/1 -> 5.7 on 11/2  - Continue to hold AZA (11/1), will revisit daily     Diffuse erythema/pruritus:  New diffuse erythema noted 10/25, initially though to be related to ABX (vancomycin-infusion syndrome) although initially derm consult with concern for dermatomyositis.  Rheumatology consulted 10/28, likely drug reaction (unlikely dermatomyositis).    - Rheumatology  recommended prednisone burst/taper (10/28), full taper ordered by primary through 11/5, chronic dose of prednisone ordered to resume 11/6     LUE swelling:  Repeat LUE US (10/27) without DVT although noted left cervical lymphadenopathy and subcutaneous soft tissue thickening in the distal upper extremity.   CT AP (10/28, given left lymphadenopathy/extremity swelling) with basilar airspace consolidation and superimposed pulmonary edema, asymmetric anasarca (L>R) and trace ascites, mildly prominent retroperitoneal lymphadenopathy, and cholelithiasis without acute cholecystitis.  MRI (11/1) with loculated L pleural effusion, small free-flowing R pleural effusion, L supraclavicular lymph nodes, similar to recent CT, L breast edema/L chest wall edema, and L breast skin thickening.  US completed (11/1) suggestive of central venous stenosis or occlusion.  Also with increased breast density, leathery skin texture, and peau d'orange appearance noted on exam 11/2.    - Repeat chest CT 11/1 again with left breast subcutaneous edema and skin thickening with adjacent lymphadenopathy, and unchanged axillary, mediastinal, and supraclavicular lymphadenopathy  - Fistulogram completed 11/2, with narrowing of left innominate vein/medial subclavian vein with collaterals, venoplasty to 10 then 12 mm  - Strongly recommend vascular consult for further evaluation of LUE swelling  - Strongly recommend general surgery consult for possible left breast malignancy     ESRD on iHD (since 10/2019): On MWF iHD.  Renal transplant evaluation on hold currently for infection and worsening PFTs of unknown etiology.  Missed dialysis run on 10/25.  Management per nephrology.      We appreciate the excellent care provided by the [facility] team.  Recommendations communicated via telephone rounding and this note.  Will continue to follow along closely, please do not hesitate to call with any questions or concerns.    Patient discussed with   "Yonny Orona.        ADRIÁN Fernandes, CNP  Pulmonary Transplant/CF Nurse Practitioner     Subjective & Interval History:     Slept okay.  No new or increased dyspnea, though very wheezy (of note has not gotten AM nebs yet).  Minimal cough, productive of clear thick sputum, easily expectorated, describes sputum as \"bubbly\".  Had chest CT last night, and fistulogram this morning, currently on bedrest, required to keep her left arm straight.  Swelling in left arm persists, has not noted any weepiness, pain, or paresthesias in the LUE.  Describes history with left breast: Noticed changes earlier this year, which is what prompted her to seek a repeat mammogram in February 2023.  Notes her left breast seems bigger and more dense than her right breast, feels that it is a leathery texture, has not noticed any nipple drainage or tenderness.  Has never had any abnormal mammograms in the past.    Review of Systems:     C: No pain, fever, chills, or night sweats.  + decreasing weight.  Appetite improving.  SKIN: Diffuse rash persists.  HEENT: No visual changes, sinus/nasal congestion, nasal drainage, sore throat, or difficulty swallowing.  PULM: See interval history  CV: No chest pain or palpitations.    GI: No heartburn or acid reflux.  No nausea or vomiting.  + loose stools, improving.    : Oliguric.  MUSCULOSKELETAL: No myalgias or arthralgias.  Decreased activity.  ENDOCRINE: Blood sugars with adequate control.  NEURO: No headache, numbness/tingling, or dizziness/light-headedness.  PSYCHIATRIC: Mood stable.    Physical Exam:     All notes, images, and labs from past 24 hours (at minimum) were reviewed.    Vital signs:  Temp: 98.3  F (36.8  C) Temp src: Oral BP: 130/66 Pulse: 93   Resp: 25 SpO2: 98 % O2 Device: Nasal cannula Oxygen Delivery: 1 LPM   Weight: (P) 61.1 kg (134 lb 11.2 oz)  I/O:   Intake/Output Summary (Last 24 hours) at 11/2/2023 1044  Last data filed at 11/1/2023 1815  Gross per 24 hour   Intake -- "   Output 2 ml   Net -2 ml     C: Lying in bed, supine in no apparent distress.   HEENT: Pupils equal, round, , conjunctivae pink, antiicteric sclerae. Oral mucosa moist without exudate.  No lymphadenopathy noted.  PULM: Coarse, inspiratory and expiratory wheezing throughout all lobes. No crackles. Non-labored breathing on  1 L O2 via nasal cannula.   CV: S1/S2 RRR, with  no murmurs, gallops, or rubs.  + BLE edema, L>R, 1-2+ pitting.  + LUE non-pitting edema.  ABD: NABS, soft, nontender, nondistended.   MSK: Moves all extremities. No apparent muscle wasting.   SKIN: Warm, dry.  + Diffuse, flaky erythematous rash.  LUE skin appears tight/stretched, but is not weeping.  Left breast skin with peau d'orange appearance, leathery texture, and more dense than right breast.  NEURO: Alert and conversant.  PSYCH: Calm, appropriate, pleasant and cooperative.    Data:     LABS    CMP:   Recent Labs   Lab 11/02/23  0734 11/01/23  0722 10/31/23  0606 10/30/23  1706 10/30/23  0652 10/27/23  0701 10/26/23  2132    139 137 136 137   < >  --    POTASSIUM 4.0 3.2* 3.6 3.4 3.4   < >  --    CHLORIDE 102 101 101 99 101   < >  --    CO2 24 26 26 25 23   < >  --    ANIONGAP 9 12 10 12 13   < >  --    * 90 130* 134* 116*   < >  --    BUN 12.3 19.0 21.2 11.0 26.4*   < >  --    CR 2.23* 2.72* 2.60* 1.94* 3.44*   < >  --    GFRESTIMATED 25* 19* 20* 29* 15*   < >  --    CHELSEY 8.5* 8.6* 8.2* 7.9* 8.9   < >  --    MAG  --  1.5*  --   --   --   --  1.5*   PHOS 3.5 2.6 3.1  --  4.0   < >  --    PROTTOTAL 5.8* 5.8* 5.3*  --  6.0*   < >  --    ALBUMIN 3.1* 3.0* 2.9*  --  3.1*   < >  --    BILITOTAL 0.6 0.7 0.6  --  0.7   < >  --    ALKPHOS 153* 131* 124*  --  114*   < >  --    AST 18 14 12  --  12   < >  --    ALT 20 19 19  --  20   < >  --     < > = values in this interval not displayed.     CBC:   Recent Labs   Lab 11/02/23  0734 11/01/23  0722 10/31/23  0606 10/30/23  1706   WBC 5.7 1.6*  1.6* 2.1*  2.1* 3.1*   RBC 2.39* 2.41* 2.31*  "2.42*   HGB 7.8* 8.0* 7.6* 7.9*   HCT 24.8* 25.3* 23.8* 24.6*   * 105* 103* 102*   MCH 32.6 33.2* 32.9 32.6   MCHC 31.5 31.6 31.9 32.1   RDW 17.5* 17.2* 17.2* 17.2*    143* 138* 152       INR: No lab results found in last 7 days.    Glucose:   Recent Labs   Lab 11/02/23  0734 11/01/23  0722 10/31/23  0606 10/30/23  1706 10/30/23  0652 10/29/23  0642   * 90 130* 134* 116* 136*       Blood Gas: No lab results found in last 7 days.    Culture Data No results for input(s): \"CULT\" in the last 168 hours.    Virology Data:   Lab Results   Component Value Date    FLUAH1 Not Detected 11/01/2023    FLUAH3 Not Detected 11/01/2023    BG4267 Not Detected 11/01/2023    IFLUB Not Detected 11/01/2023    RSVA Not Detected 11/01/2023    RSVB Not Detected 11/01/2023    PIV1 Not Detected 11/01/2023    PIV2 Not Detected 11/01/2023    PIV3 Not Detected 11/01/2023    HMPV Not Detected 11/01/2023    HRVS Negative 01/24/2021    ADVBE Negative 01/24/2021    ADVC Negative 01/24/2021    ADVC Negative 12/23/2020    ADVC Negative 10/07/2019       Historical CMV results (last 3 of prior testing):  Lab Results   Component Value Date    CMVQNT <35 (A) 10/31/2023    CMVQNT Not Detected 07/11/2023    CMVQNT Not Detected 04/06/2023     Lab Results   Component Value Date    CMVLOG <1.5 10/31/2023    CMVLOG 2.0 10/24/2023    CMVLOG 1.9 10/24/2023       Urine Studies    Recent Labs   Lab Test 01/24/21  1729 10/21/19  2240   URINEPH 5.0 5.0   NITRITE Negative Negative   LEUKEST Moderate* Large*   WBCU 34* 115*       Most Recent [place] Pulmonary Function Testing (FVC/FEV1 only)  FVC-Pre   Date Value Ref Range Status   10/24/2023 0.96 L    09/07/2023 1.25 L    08/08/2023 1.35 L    07/11/2023 1.47 L      FVC-%Pred-Pre   Date Value Ref Range Status   10/24/2023 33 %    09/07/2023 42 %    08/08/2023 46 %    07/11/2023 50 %      FEV1-Pre   Date Value Ref Range Status   10/24/2023 0.87 L    09/07/2023 1.07 L    08/08/2023 1.12 L  "   07/11/2023 1.16 L      FEV1-%Pred-Pre   Date Value Ref Range Status   10/24/2023 37 %    09/07/2023 46 %    08/08/2023 48 %    07/11/2023 49 %        IMAGING    Recent Results (from the past 48 hour(s))   MR Chest w/o & w Contrast    Narrative    MRI chest with and without contrast    Indication persistent marked left upper extremity swelling    COMPARISON: 10/10/2019    FINDINGS: Images obtained before and after intravenous administration  of 6 mL Gadavist contrast.  Loculated left pleural effusion. Trace right layering pleural  effusion. Median sternotomy artifact.  Cord and CSF signals appear unremarkable. Aorta normal size. Main  pulmonary artery mildly enlarged at 3.4 cm. The no obvious brachial  plexus mass on either side. Fatty muscle atrophy throughout. No  enlarged lymph nodes in the chest. Benign morphologically appearing  bilateral axillary lymph nodes with fatty marguerite.   The included upper abdomen shows pericholecystic fluid with a  nondistended gallbladder. No pericardial effusion. No streaky  perihilar opacities relatively similar to chest CT of 10/10/2019  consistent with chronic probable inflammatory/infectious process.  Postcontrast imaging shows normal appearance of the left subclavian  artery..  Prominent lymph nodes possibly noted in the left supraclavicular  space. Density near the left paravertebral lobe pleural space with rim  hyperdensity on recent CT 10/27/2023 showing internal signal on DWI an  lack of enhancement. This may represent a healed infection. History of  prior bilateral lung transplant.  Subcutaneous edema also in the left breast tissues and subcutaneous  left chest wall compared to the right.      Impression    IMPRESSION:  1. Mildly prominent left supraclavicular lymph nodes. Grossly similar  to recent CT scan of the chest 4 days ago.  2. Diffuse muscle atrophy.  3. Abnormal density anterior posterior left lower rib in the left  pleural space which was peripherally calcified  on the recent CT. This  may be old inflammatory/infectious process.  4. Loculated left pleural effusion with small free-flowing right  pleural effusion. 9 skin thickening of left breast.  5. Skin thickening of left breast. Please correlate with dedicated  breast imaging as appropriate. Edema in the left breast tissues as  well as left chest wall. Possible inflammatory/infectious process.  6. Bilateral lung transplant history.  7. Nondistended gallbladder with small amount of pericholecystic fluid  in patient with reported previous history cholelithiasis.    TERRI ISSA MD         SYSTEM ID:  [id number]   US Ext Arterial Venous Dialys Acs Graft    Narrative    ULTRASOUND EXTREMITY ARTERIAL VENOUS DIALYSIS ACCESS GRAFT 11/1/2023  11:31 AM    CLINICAL HISTORY: Dialysis fistula on LUE, having LUE swelling no  clear etiology. Left axillary to cephalic graft. Fistulogram and  declot 3/2/2021.    COMPARISONS: Fistulogram and declot 3/2/2021.    REFERRING PROVIDER: GISSEL SONG    TECHNIQUE: Left arm dialysis graft evaluated with grayscale, color  Doppler, and spectral pulsed wave Doppler ultrasound. Flow volumes  obtained.    FINDINGS: LEFT:  Subclavian artery: 119/36 cm/s    Axillary artery, above anastomosis: 181/67 cm/s  Axillary artery, above anastomosis: 6.8 mm, 1240 cc/min Vol Flow    Brachial artery, below anastomosis: 102/0 cm/s, antegrade    Arterial anastomosis: 3.6 mm, 420 cm/s, 2.32 velocity ratio    Graft, distal to anastomosis: 5.9 mm, 561 cm/s  Graft, distal to anastomosis: 6.1mm, 362 cm/s  Graft, upper arm: 6.4 mm, 244 cm/s  Graft, upper arm: 6.1 mm, 1207 cc/min Vol Flow  Graft, upper arm: 15.6 mm, 112 cm/s, mural thrombus causes minimal  narrowing  Graft, mid arm: 12.2 mm, 128 cm/s  Graft, above antecubital fossa: 8.2 mm, 127/63 cm/s  Graft, above antecubital fossa: 7.6 mm, 1267 cc/min Vol Flow  Graft, above venous anastomosis: 7.4 mm, 142 cm/s    Venous anastomosis: 5.3 mm, 125 cm/s    Cephalic vein,  antecubital fossa, above anastomosis: 13.3 mm, 169 cm/s  Cephalic vein, antecubital fossa: 7.2 mm, 179 cm/s  Cephalic vein, antecubital fossa: 6.0 mm, 1085 cc/min Vol Flow  Cephalic vein, mid arm: 8.0 mm, 117 cm/s  Cephalic vein, upper arm: 8.6 mm, 93 cm/s  Cephalic vein, shoulder: 7.9 mm, 126 cm/s  Cephalic vein, peripheral to confluence: 7.2 mm, 154 cm/s  Cephalic vein, nearing confluence: 232 cm/s    Cephalic subclavian confluence not well visualized.    Cephalic subclavian confluence: 5.1 mm, 153 cm/s, 194 cm/s    Subclavian vein, medial to confluence: 228 / -102 cm/s, bidirectional    Subclavian vein, mid: 59 cm/s, retrograde  Axillary vein: 76 cm/s, retrograde    Innominate vein: not visualized    Internal jugular vein: occluded      Impression    IMPRESSION: Left axillary to cephalic graft.  1. No arterial inflow stenosis demonstrated.    2. Arterial anastomosis patent. 3.6 mm, 420 cm/s, 2.32 velocity ratio.    3. Graft patent. 15.6 mm pseudoaneurysm in the upper arm with mural  thrombus causing minimal narrowing. 12.2 mm pseudoaneurysm in the mid  arm. Flow volumes in the graft well exceed 600 cc/min (1207 cc/min in  the upper arm and 1085 cc/min above the antecubital fossa.    4. Venous anastomosis patent. 5.1 mm, 153 cm/s, 194 cm/s    5. Outflow cephalic vein patent. Pseudoaneurysmal or aneurysmal  dilation in the antecubital fossa to 13.3 mm diameter. Flow volumes  exceed 600 cm/s.    6. Fistulogram demonstrated two cephalic subclavian confluences. Only  1 demonstrated in this study. [place] not well visualized.    7. Central venous stenosis or occlusion suggested. Fistulogram could  be performed for further evaluation and possible treatment. Innominate  vein not visualized. Bidirectional flow in the subclavian vein medial  to the cephalic subclavian confluence. Retrograde flow in subclavian  vein lateral to the confluence and in the axillary vein.    I have personally reviewed the examination and  initial interpretation  and I agree with the findings.    OBIE DAWSON MD         SYSTEM ID:  [id number]   CT Chest w Contrast    Narrative    EXAM: CT CHEST W CONTRAST 11/1/2023 9:14 PM    HISTORY: 62 years Female Evaluate for possible clot/mass/lymphatic  obstruction/inflammatory cause of LUE swelling, please include LUE    COMPARISON: CT chest 10/27/2023, MR chest 10/31/2023.    TECHNIQUE: Helical CT imaging of the chest was obtained with contrast.  Multiplanar post-processed and MIP reformats were obtained reviewed.  The left upper extremity was additionally included within the  field-of-view.    FINDINGS:  LINES/TUBES: None.    MEDIASTINUM: Prominent left inferior cervical lymph nodes. Mildly  enlarged bilateral pulmonary window lymph node measuring 1.0 cm,  subcarinal node measuring 1.1 cm an additional subcentimeter  paratracheal lymph nodes. Normal size/configuration of the great  vessels. Normal heart size. No pericardial effusion. Atherosclerotic  calcifications of the aortic arch. Patent vascular anastomoses.    LUNG: Postsurgical change of bilateral lung transplantation. The  bronchial anastomoses are patent. There is unchanged mild narrowing of  the right mainstem bronchus just distal to the anastomosis. Similar  appearance of patchy peribronchial vascular opacities predominantly in  the right lower lobe, and to a lesser degree the left upper lobe, left  lower lobe, and right middle lobe. There is decreased septal  thickening in the right upper lobe.     PLEURA: No pneumothorax. Small loculated left pleural effusion. Trace  right pleural effusion. Unchanged peripherally calcified 2.6 x 1.7 cm  soft tissue density within the inferior medial left pleural space.    LOWER NECK: Thyroid unremarkable.    UPPER ABDOMEN: Esophagus appears unremarkable. Cholelithiasis with  mildly prominent gallbladder wall and small volume pericholecystic  fluid. Mildly dilated common bile duct measuring up to 1.1 cm.  Mild  dilation of the main pancreatic duct without visualized pancreatic  mass.    BONES: No acute osseous abnormality. No suspicious bony lesions.    SOFT TISSUES: Overlying subcutaneous thickening and soft tissue fat  stranding involving the left breast.     Lymph nodes: Ongoing prominent and enlarged axillary lymph nodes. For  example there is a 1.5 x 1.1 cm left axillary lymph node (series 4,  image 80), previously 1.6 x 1.1 cm, as well as a right axillary lymph  node measuring 1.6 x 1.1 cm (series 4, image 64), previously 1.4 x 1.1  cm. Numerous prominent supraclavicular and mediastinal lymph nodes,  unchanged compared to prior.    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  Vasculature appears patent. 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  Soft tissue swelling throughout the left upper extremity.      Impression    IMPRESSION:   1.  The left upper extremity vasculature, including the arteriovenous  fistula graft, appear patent with known pseudoaneurysms in the upper  and mid arm. Left upper extremity subcutaneous edema. No additional  findings identified in the left upper extremity to explain patient's  symptoms.  2.  Ongoing left breast subcutaneous edema and skin thickening with  adjacent lymphadenopathy, likely infectious/inflammatory.  3.  Unchanged axillary, mediastinal, and supraclavicular  lymphadenopathy.  4.  Cholelithiasis with pericholecystic fluid, mild wall thickening,  and mild dilation of the common bile duct and main pancreatic duct.  Recommend correlation with clinical symptoms. Consider further  evaluation with ultrasound versus MRCP if concern for  cholecystitis/biliary obstruction.  5.  Ongoing but slightly improved pulmonary opacities compared to  10/27/2023. Differential includes pulmonary edema and/or infection.  6.   Stable loculated left pleural effusion and trace right pleural  effusion.    I have personally reviewed the examination and initial interpretation  and I agree with the findings.    SUNITA ADKINS MD         SYSTEM ID:  [id number]   IR Dialysis Fistulogram Left    Narrative    PROCEDURE: Dialysis circuit evaluation and interventions 11/2/2023    Procedural Personnel  Attending physician(s): LLUVIA Rodriguez MD  Fellow physician(s): BRODERICK Urbano MD  Resident physician(s): None  Advanced practice provider(s): None    Pre-procedure diagnosis: Functioning fistulagram, arm swelling  Post-procedure diagnosis: Same  Indication: Arm swelling  Additional clinical history: None    Complications: No immediate complications.      Impression    IMPRESSION:  Fistulagram demonstrates significant recurrent narrowing of the left  innominate vein/medial subclavian vein with significant chest  collaterals. Segment was venoplastied to 10 mm followed by application  of 12 mm drug coated balloon with good angiographic results.     Plan:  The graft can be used for dialysis immediately.  _______________________________________________________________    PROCEDURE SUMMARY:  - Access of dialysis circuit with ultrasound guidance  - Procedure: Angiogram and central segment angioplasty  - Additional procedure(s): None    PROCEDURE DETAILS:    Pre-procedure  Consent: Informed consent for the procedure including risks, benefits  and alternatives was obtained and time-out was performed prior to the  procedure.  Preparation: The site was prepared and draped using maximal sterile  barrier technique including cutaneous antisepsis.    Anesthesia/sedation  Level of anesthesia/sedation: Moderate sedation (conscious sedation)  Anesthesia/sedation administered by: Independent trained observer  under attending supervision with continuous monitoring of the  patient?s level of consciousness and physiologic status  Total intra-service sedation time (minutes): 38  4mg  zofran IV pre procedure, 2 mg versed IV, 100 mcg fentanyl IV?    Initial dialysis circuit evaluation  Dialysis circuit side: Left  Dialysis circuit type: Upper arm loop graft  Initial circuit palpation: Pulse but no thrill    Access (towards outflow)  Local anesthesia was administered. The dialysis circuit was  sonographically evaluated and judged to be patent. Real time  ultrasound was used to visualize needle entry into the dialysis  circuit and a permanent image was stored..   Access technique: Micropuncture set with 21 gauge needle  Sheath size (Malagasy): 7    Initial angiography  Initial angiography of the dialysis circuit, outflow veins, and  central veins was performed.  Findings: Fistulagram demonstrates significant recurrent narrowing of  the left innominate vein/medial subclavian vein with significant chest  collaterals.     Angioplasty  Location: Left innominate vein/medial SCV  Angioplasty balloon: 10 mm Huron followed by 12 mm Lutonix  Angioplasty balloon type: Conventional followed by Lutonix    Final angiography  Findings: Significant caliber improvement of segment and resolution of  chest wall collaterals  Final dialysis circuit palpation: Palpable thrill    Reflux angiogram showed axillary artery anastomosis with no evidence  of stenosis.    Closure (outflow)  The sheath was removed and hemostasis was achieved with an external  device. A sterile dressing was applied.    Radiation Dose  Fluoroscopy time (minutes): 4.1    Reference air kerma (mGy): 6.0   Kerma area product (uGy-m2): 694.87      Additional Details  Additional description of procedure: None  Registry event: V/3/g  Device used: None  Equipment details: None  Unique Device Identifiers: Not available  Specimens removed: None  Estimated blood loss (mL): Less than 10  Standardized report: [email or web address]_v3.1    Attestation  Signer name: CLAUDINE ESTRADA MD  I attest that I was present for the key elements  of the procedure and  immediately available. I reviewed the stored images and agree with the  report as written.    I have personally reviewed the examination and initial interpretation  and I agree with the findings.    CLAUDINE ESTRADA MD         SYSTEM ID:  [id number]     \

## 2023-11-02 NOTE — CONSULTS
"    Interventional Radiology  [facility] Consult Service Note  11/02/23   7:28 AM    Consult Requested: \"fistulogram of LUE\"    Recommendations/Plan:    Patient is on IR schedule 11/2 for a LUE fistulogram with possible intervention.   Labs WNL for procedure.  Orders entered for procedure, NPO status.  Consent will be done prior to procedure.     Please contact the IR charge RN at [phone] for estimated time of procedure.     Case and imaging discussed with IR attending, Dr. Sanchez.  Page to Dr. Nesbitt at [phone].    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 Per nephrology is dialyzing without issue but endorses significant LUE swelling. Last fistulogram at OSH 8/15/23 with treatment of occluded brachiocephalic vein and multifocal severe stenosis of the venous outflow cephalic vein. IR is now consulted for LUE fistulogram and possible intervention given recurrent swelling. Based on US 11/1 would evaluate for central stenosis or occlusion.     Pertinent Imaging Reviewed: US extremity AV graft 11/1    Expected date of discharge:  TBD    Vitals:   /68 (BP Location: Right arm)   Pulse 93   Temp 98.3  F (36.8  C) (Oral)   Resp 18   Wt 61.8 kg (136 lb 3.2 oz)   LMP 06/07/2014 (Exact Date)   SpO2 99%   BMI 24.13 kg/m      Pertinent Labs:   Lab Results   Component Value Date    WBC 1.6 (L) 11/01/2023    WBC 1.6 (L) 11/01/2023    WBC 2.1 (L) 10/31/2023    WBC 2.1 (L) 10/31/2023    WBC 7.5 05/02/2021    WBC 6.8 04/30/2021    WBC 6.3 04/29/2021     Lab Results   Component Value Date    HGB 8.0 11/01/2023    HGB 7.6 10/31/2023    HGB 7.9 10/30/2023    HGB " 10.6 05/02/2021    HGB 10.9 04/30/2021    HGB 11.1 04/29/2021     Lab Results   Component Value Date     11/01/2023     10/31/2023     10/30/2023     05/02/2021     04/30/2021     04/29/2021     Lab Results   Component Value Date    INR 0.99 10/24/2023    INR 0.9 08/16/2023    INR 0.9 08/16/2023    INR 0.99 04/29/2021    PTT 28 02/12/2021     Lab Results   Component Value Date    POTASSIUM 3.2 (L) 11/01/2023    POTASSIUM 3.8 10/17/2023    POTASSIUM 3.5 05/26/2022    POTASSIUM 4.3 05/07/2021        COVID-19 Antibody Results, Testing for Immunity           No data to display              COVID-19 PCR Results          1/24/2022    13:24 1/28/2022    13:10 1/31/2022    13:09 2/7/2022    13:01 4/1/2022    12:57 5/23/2022    10:13 9/26/2022    09:56 10/12/2022    15:28 7/11/2023    11:32   COVID-19 PCR Results   COVID-19 Virus PCR to [place] - Result  Undetected     Undetected     Undetected      Undetected          COVID-19 Virus by PCR (External Result) Positive        Positive     Undetected     Undetected     Undetected     Negative     Undetected     Negative     Negative        SARS CoV2 PCR         Negative       Details          This result is from an external source.               ADRIÁN Oneill CNP  Interventional Radiology  Pager: [phone]

## 2023-11-02 NOTE — PRE-PROCEDURE
GENERAL PRE-PROCEDURE:   Procedure:  LUE fistulagram  Date/Time:  11/2/2023 8:20 AM    Verbal consent obtained?: Yes    Written consent obtained?: Yes    Risks and benefits: Risks, benefits and alternatives were discussed    Consent given by:  Patient  Patient states understanding of procedure being performed: Yes    Patient's understanding of procedure matches consent: Yes    Procedure consent matches procedure scheduled: Yes    Expected level of sedation:  Moderate  Appropriately NPO:  Yes  Mallampati  :  Grade 3- soft palate visible, posterior pharyngeal wall not visible  Lungs:  Lungs clear with good breath sounds bilaterally  Heart:  Normal heart sounds and rate  History & Physical reviewed:  History and physical reviewed and no updates needed  Statement of review:  I have reviewed the lab findings, diagnostic data, medications, and the plan for sedation

## 2023-11-02 NOTE — PLAN OF CARE
Goal Outcome Evaluation:      Plan of Care Reviewed With: patient    Overall Patient Progress: no changeOverall Patient Progress: no change    Outcome Evaluation: [id number]: Pt A&Ox4, VSS on 2.5L NC. Denies pain. Assist of one to bedside commode. Pt has red and flaky skin. Refused kenalog cream but applied hydrocortisone to hands last night. L arm more swollen than R. R PIV TKO. L arm fistula for HD. Bronchoscopy done yesterday w/ a CXR as a follow-up. Continue with POC.

## 2023-11-03 NOTE — PROGRESS NOTES
Resident/Fellow Attestation   I, Donny Shah MD, was present with the medical/MARIA A student who participated in the service and in the documentation of the note.  I have verified the history and personally performed the physical exam and medical decision making.  I agree with the assessment and plan of care as documented in the note.      Donny Shah MD  PGY2  Date of Service (when I saw the patient): 11/03/23      [facility]    Medicine Progress Note - Medicine Service, [facility] TEAM 3    Date of Admission:  10/25/2023    Assessment & Plan   Kecia Blue is a 62 year old female admitted on 10/25/2023. 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    Today:  - Near resolution of LUE and L breast edema s/p central segment angioplasty 11/2  - C/w prednisone taper (10 mg dose - 11/3 through 11/5) per rheum  - C/w Augmentin 500 mg (with renal adjustment) po qday (take through 11/7)  - CXR repeat in the AM      #LUE swelling, improved  #LLE swelling, improved  #L breast edema, chronic >1 yr, improved  #Lymphadenopathy (?reactive)- retroperitoneal, bilateral axillary, L retropectoral regions  Pt's L arm appearing asymmetrically swollen compared to R arm, initially noted 10/27/23. L arm swelling was a notable change from previous days of hospitalization.  On 10/28, LLE also more swollen and erythematous vs RLE.  Edema significant to the point that wedding ring had to be cut  off with mechanical saw. D/t presence of unilateral extremity swelling, there is concern for DVT.  LUE US performed 10/27, 10/28, 10/30 all negative for DVT. B/l LE US 10/26, 10/28 negative for PE. Pt also has approx 1 yr history of peau d'orange appearance of L breast- of note had mammogram in 02/2023 which was negative for malignancy. Pt also at risk of post transplant lymphoproliferative disorders given hx of b/l lung transplant c/b EBV virema. Multiple prominent retroperitoneal and L periaortic (?reactive) lymph nodes noted on CT abdomen 10/28, PTLD not ruled out, per pulm transplant team. CT chest 10/28 with no evidence of PE, but multiple b/l axillary and L retropectoral (?reactive) lymph nodes noted, L breast skin thickening and breast edema of uncertain etiology noted. Have discussed c/f fistula issue v. steal syndrome with nephrology who after chart review and physical exam feels confident this is not source of edema, did not identify indication to pursue fistulogram at this point; however radiology recommending US of LUE AV fistula as reasonable initial test to assess for fistula dysfunction. Appreciating input from radiology for recommended imaging modalities for further assessment, considering compressive mass contributing to SVC picture as possible etiology of LUE swelling.   US LUE AV dialysis fistula 11/1 demonstrating central venous stenosis/occlusion. Fistulogram with central segment venoplasty performed 11/2, brachiocephalic stenosis noted with multiple chest collaterals, discussed with IR, these findings correlate clinically with her LUE and chest/L breast edema, which have since resolved following venoplasty.     #Erythroderma, improving  #Diffuse erythema, resolved  #Diffuse pruritus, resolved  #Hx dermatomyositis  Pt noted to have blanchable erythema diffusely throughout torso and extremities upon admission while in ED. Appears was acute in onset. Differential including recurrent  dermatomyositis, viral exanthem (hx EBV viremia, CMV viremia on VGCL), or drug eruption. Pt had received zosyn prior to rash in ED, along with Augmentin the day before arrival to ED. Rash appeared prior to vancomycin being administered in ED, this was confirmed by ED pharmacist on staff day of admission. Dermatology consulted; not felt to be drug rash; unsure if acute DM flare d/t absence of shawl sign, holster sign, muscle weakness, however Pt with periorbital edema and plaques suggestive of DM. Diffuse erythema resolving, no pruritus since day of admission; however Pt now with near full-body erythrodermic eruption involving face, trunk, extremities. Rheumatology also following d/t concern that skin presentation could be 2/2 DM flare. Rheum panels ordered and being followed by rheum. Per Rheum, unlikely to be acute DM flare, however, reasonable to treat rash with prednisone burst and topical steroids, which was discussed and approved with transplant pulmonology. Cutaneous findings improving in response to steroids.   - Dermatology following:   -No indication for skin biopsy at this time  - Rheumatology following:   -Dermatomyositis serologies / rheum panels followed by rheum  -Prednisone 20mg x3 days (completed), 15mg x3 days (completed), 10mg x3 days (11/3-11/5) then return to pta 5mg qday on 11/6  - c/w Atarax 25mg q6h PRN for itching    #Recurrent right mainstem bronchial stenosis s/p balloon dilations  #Post-obstructive pneumonia in setting of recurrent RMB stenosis s/p balloon dilation, improved  #Actinomyces (+) BAL (10/17)  #Acute hypoxic respiratory failure, improved  Pt with 2month hx of cough c/f pneumonia. Hx BL lung transplant (2018) c/b right mainstem bronchial stenosis requiring serial treatments with balloon bronchoplasty procedures. Pt assessed by Transplant ID team throughout hospital course who characterized her history of repeated balloon dilations followed by worsening cough as post-obstructive  pneumonia, likely from significant oral contamination by Actinomyces leading to slowly progressive pulmonary symptoms. Pt was treated with Zosyn then with Augmentin for total abx course of 14 days. Interventional Pulmonology performed bronchoscopy and balloon dilation RMB and BAL RLL on 11/2. Plan is for Interventional Pulm team to create custom made silicon stent design for RMB stenosis. Pt will be contacted by Interventional Pulm to arrange bronchoscopy when stent ready after hospital discharge.   -Transplant ID & Transplant Pulm following  - C/w Augmentin 500 mg po qday (11/3-11/7) dosed after dialysis    #ILD 2/2 anti-synthetase syndrome s/p B/L lung transplant 3/2018  #Chronic lung allograft dysfunction  #Hx Stenotrophomonas pneumonia  #Hx EBV viremia  #Hx PJP  BAL on 10/17 notable actinomyces. Pt also has hx of stenotrophomonas from 08/2023. Chest CT and serial CXR findings of bilateral perihilar airspace opacification, and small b/l pleural effusions remaining largely during hospitalization, no pneumothorax. For CLAD, was on azithromycin, singulair, advair PTA. Azithromycin held for period during current admission d/t prolonged Qtc on EKG, but was restarted 10/30 after Qtc normalization on EKG. Echo 10/27/23 showing normal appearing RV, pulmonary hypertension noted. Cough improving w/ use of tessalon pearles.   -C/w tessalon pearles 100mg q4h PRN  -Scheduled nebs for breathing  -Pulm transplant following:   -Wean supplemental O2 PRN to maintain o2 saturations >92%  -EBV levels per transplant pulm   -Restart azathioprine 50mg qday on 11/8 (after completing abx treatment for PNA)   -c/w tacrolimus. Check levels per translplant pulm   -c/w Bactrim for PJP ppx  -c/w azithromycin, singulair + advair for CLAD    #CMV viremia  #Leukopenia, resolved  Low-grade CMV viremia since 8/2023. Has been following with transplant ID team. Was started on VGCV three times weekly after HD at approx 10/6/23. VGCV dosing increased  to daily beginning 10/27 given persisting low-level viremia on repeat CMV levels. WBC trending downwards over the past week 11.6 (10/25) --> 2.1 (10/31) following change in dosing frequency of VGCV.  SubQ filgrastim 11/1 administered for WBC 1.6; WBC subsequently improving. Transplant ID followed, now signed off.  -CMV levels per transplant ID/transplant pulm  -C/w valgancyclovir 450 mg three times weekly on dialysis days after receiving dialysis. Due to neutropenia/leukopenia when taking at qday dosing.    #Cholelithiasis/tumefactive sludge w/o obstruction of CBD  #Acute abdominal pain, LLQ, resolving  #Watery diarrhea, resolved  #h/o C diff colitis  Sudden onset of acute LLQ abdominal pain in afternoon of 10/30. for sudden acute abdominal pain. Had a more formed BM this morning, no other stooling since. Anorexia currently without emesis. Lactate, CBC, BMP all wnl. AXR with nonobstructive bowel gas pattern. Etiology possibly 2/2 gastroenteritis. Pt with 6 watery BM's evening of 10/29. No watery diarrhea since 10/29. Two BM's that were more formed 10/30 and one again on morning of 10/31. Pt appears to have remote hx of C diff colitis. C diff testing returned negative this hospitalization. Abdominal US performed 11/2 after elevation in alk phosph c/f possible biliary obstruction- results demonstrating cholelithiasis / tumefactive sludge with some gallbladder wall thickening. No common bile duct obstruction identified.     #Recurrent transient hypotension w/o altered mentation, improved  Rapid response was called overnight on 10/26 and 10/27 d/t hypotension, tachycardia, and tachypnea. Doppler venous US of bilateral lower extremities on 10/26 with no DVT. PTA metoprolol held. Lactate 1.2. Temperature remains < 100.4* F. Hypotension responsive to albumin and midodrine. Bedside cardiac ultrasound  10/27 with collapsible IVC. Main concern on differential remains sepsis 2/2 suspected pulmonary infection, drug reaction, or  possible adrenal insufficiency. However, episodic hypotension may be within Pt's normal range, as Pt continues to remain asymptomatic during RRTs. TTE completed 10/27 w/ EF 60-65%. Pulmonary hypertension noted. Of note, started on steroids for rash on 10/28, no RRTs since 10/28.  - C/w midodrine 10 mg TID     #Prolongation of Qtc, resolved  #Hx Arrythmia (Afib)  EKG 10/26/23 with prolonged Qni=455; normalized to 455 on EKG 10/30.  EKG 10/27 demonstrating Afib.      #ESRD on HD (M, W, F)  #Anemia of CKD, macrocytic  #Hypophosphatemia  #Hx Hyperphosphatemia  Hx of ESRD on HD, currently M, W, F.  Phoslo held on 10/29 due downtrending phos; recheck notable for phos 1.1; required IV + PO supplementation with resolution of hypophosphatemia, continuing to monitor trend. Low Hg c/w noted hx of anemia of CKD  -C/w iHD per renal dialysis team  -ESRD service consulted:   -c/w PTA vitamins   -Epo/iron per nephrology   -hold phoslo d/t recent hypophosphatemia  -Renal panel daily    #Hx Left Sided Aspergillus Empyema:  First noted in 2019. Needle aspiration confirmatory of aspergillus fumigatus, now s/p intrapleural amphotericin bead placement. Discussions had regarding surgery, however, surgery felt to be associated with too high of morbidity, and indefinite posaconazole decided as plan.  -Transplant pulmonology following   - c/w PTA posaconazole. Check levels per transplant pulm.    Diet: Renal Diet (dialysis)    DVT Prophylaxis: Heparin SQ  Lines: None     Cardiac Monitoring: None  Code Status: Full Code        Disposition Plan      Expected Discharge Date: 11/04/2023, 12:00 PM    Destination: home with family  Discharge Comments: Dispo: home w/ family, OP HD  Plan: discharge Sat 11/4  Progress: improvement in LUE + L breast edema s/p venoplasty of central vv          The patient's care was discussed with the Attending Physician, Dr. Rey .    Rihcy Jean  Medical Student  Medicine Service, [facility] TEAM [address] [place] [address]  General acute hospital  Securely message with [employer] (more info)  Text page via [employer] Paging/Directory   See signed in provider for up to date coverage information  ______________________________________________________________________    Interval History   - Significant improvement, near resolution of LUE and L breast edema after having venoplasty for central venous stenosis/obstruction  - Pt reports she has been seen before for concerns of AV fistula obstruction/stenosis. Reports having fistulogram done sometime within this past year at some location in [place], [place] for obstruction/stenosis concerns related to AV fistula. States having long-standing challenges with complications related to fistula. The fistulogram she received PTA helped improve flow through fistula. Angioplasty had been performed in central veins too, but was not successful and she continued to have progressively worsening swelling in chest/L breast region, then more recently swelling in LUE.   - Pt states cough is near baseline, cough noticeable primarily upon waking in AM. Denies significant cough throughout the day as she had previously during this hospitalization. Continues to report tessalon perles are very helpful for cough.   - Planning discharge to home for Sat 11/4    Physical Exam   Vital Signs: Temp: 97.9  F (36.6  C) Temp src: Oral BP: 123/48 Pulse: 100   Resp: 18 SpO2: 92 % O2 Device: Nasal cannula Oxygen Delivery: 2 LPM  Weight: 132 lbs 4.8 oz    General appearance: NAD, awake, alert, pleasant. Sitting upright in bed.  Respiratory: intermittent coughing. Normal respiratory effort on NC O2. Minimal wheezing bilaterally, significantly improved from previous  Extremities: LUE edema notably reduced. RUE and LUE hand and forearm appear symmetric in size. LUE upper arm (around AV fistula site) > RUE upper arm edema.   Skin: no diffuse erythema as previous. Dry flaky skin minimal across body.       Data   NOTE: Data  reviewed over the past 24 hrs contributes toward MDM complexity

## 2023-11-03 NOTE — PLAN OF CARE
Goal Outcome Evaluation:      Plan of Care Reviewed With: patient    Overall Patient Progress: no changeOverall Patient Progress: no change    Outcome Evaluation: Patient AxOx4, VSS on 1 L o2 overnight. No acute changes this shift. Patient slept well, denied pain/discomfort. LS clear on auscultation. L arm swelling improving per patient. Possible d/c today.

## 2023-11-03 NOTE — PLAN OF CARE
Goal Outcome Evaluation:                 Outcome Evaluation: IR fistulogram done today, successful balloon placement to treat pt's stenosis. LUE swelling improving. ID agreed with pts current abx plan that is now PO. HD tomorrow. Pulmonology followed up with pt on bronch results and plan to place custom silicone stent in the future. Pulmonology discussed with writer concern over pts thickened skin around breast tissue and defered work up to primary team.

## 2023-11-03 NOTE — PROGRESS NOTES
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 Glasgow. Run time: 3.5 hrs. EDW 59.7 kg. Access: L AVF.  - Will likely discharge 11/3 or 11/4       # pAfib: on BB, does not appear to be on AC    # BP/Volume: EDW 59.7 kg, PTA metoprolol 25 mg bid, on occasion pt requires prn midodrine however generally her BP's are in 150-170's. Minimal UOP  -  improving volume status with trace to 1+ edema of R arm and leg only  - ordered prn midodrine 10 mg  - pre HD wt today 61.7 kg, UF to EDW    # L upper  extremity > R upper extremity, improving:  - US ruled out DVT  - US of fistula with central stenosis  - s/p fistulogram with venoplasty on 11/2  - MRI without obvious obstructing mass    # Anemia of CKD: hgb 8's. PTA on Mirerca 75mcg [id number] (last dose 9/27, due for next dose); venofer 50 mg qweek.  -start epogen 6000 units per HD       # BMD: Ca 8's, phos 3.8, alb 3.0; PTA on PO calcitriol 0.75 mcg MWF, Phoslo 2 tabs tid WM  - Phoslo is held       # ID: on zosyn, azithromycin, posaconazole    # s/p b/l lung tx: c/b recurrent stenosis  - s/p bronch with dilation  - pulm is ordering custom fit stent          Recommendations were communicated to primary team via this note      MERRICK Coronado   Division of Renal Disease and Hypertension  [phone]    Interval History :   Seen on dialysis, stable run to dry weight. L arm swelling improving s/p fistulogram  with venoplasty of central veins. May discharge today. No n/v, CP, SOB, chills currently      Review of Systems:   4 point ROS neg other than as noted above    Physical Exam:   I/O last 3 completed shifts:  In: 480 [P.O.:480]  Out: -    /73   Pulse 104   Temp 97.9  F (36.6  C) (Oral)   Resp 16   Wt 61.7 kg (136 lb 0.4 oz)   LMP 06/07/2014 (Exact Date)   SpO2 99%   BMI 24.10 kg/m       GENERAL APPEARANCE: alert, NAD  EYES: no scleral icterus, pupils equal  Pulmonary: diminished  CV: RRR   - Edema trace to 1+ LUE  GI: soft, non-tender   MS: no evidence of inflammation in joints, no muscle tenderness  : no rivas  NEURO: face symmetric, a/o3  Access: L AVF    Labs:   All labs reviewed by me  Electrolytes/Renal -   Recent Labs   Lab Test 11/03/23  0556 11/02/23  0734 11/01/23  0722 10/27/23  0701 10/26/23  2132 10/25/23  1356 10/24/23  1033    135 139   < >  --    < > 138   POTASSIUM 3.6 4.0 3.2*   < >  --    < > 3.7   CHLORIDE 103 102 101   < >  --    < > 96*   CO2 23 24 26   < >  --    < > 31*   BUN 24.6* 12.3 19.0   < >  --    < > 14.6   CR 3.68* 2.23* 2.72*   < >  --    < > 3.43*   * 140* 90   < >  --    < > 120*   CHELSEY 8.2* 8.5* 8.6*   < >  --    < > 9.0   MAG  --   --  1.5*  --  1.5*  --  1.9   PHOS 3.8 3.5 2.6   < >  --    < >  --     < > = values in this interval not displayed.       CBC -   Recent Labs   Lab Test 11/03/23  0556 11/02/23 0734 11/01/23 0722   WBC 8.8 5.7 1.6*  1.6*   HGB 7.9* 7.8* 8.0*    153 143*       LFTs -   Recent Labs   Lab Test 11/03/23  0556 11/02/23  0734 11/01/23  0722   ALKPHOS 140* 153* 131*   BILITOTAL 0.5 0.6 0.7   ALT 20 20 19   AST 14 18 14   PROTTOTAL 5.9* 5.8* 5.8*   ALBUMIN 3.1* 3.1* 3.0*       Iron Panel -   Recent Labs   Lab Test 02/03/21  0415 12/13/18  1033 08/01/18  0921   IRON 51 16* 93   IRONSAT 36 7* 37   YOLA  --  302* 571*         Imaging:  Reviewed    Current Medications:   acetylcysteine  2 mL Nebulization 4x Daily     amoxicillin-clavulanate  1 tablet Oral Q24H    [Held by provider] azaTHIOprine  50 mg Oral QPM    azithromycin  250 mg Oral Daily    [Held by provider] calcium acetate  667 mg Oral TID w/meals    fluticasone-vilanterol  1 puff Inhalation Daily    heparin ANTICOAGULANT  5,000 Units Subcutaneous Q12H    hydrocortisone   Topical BID    HYDROmorphone  0.2 mg Intravenous Once    levalbuterol  1.25 mg Nebulization 4x Daily    [Held by provider] metoprolol tartrate  25 mg Oral or Feeding Tube BID    midodrine  10 mg Oral TID w/meals    montelukast  10 mg Oral At Bedtime    multivitamin RENAL  1 tablet Oral Daily    pantoprazole  40 mg Oral QAM AC    posaconazole  300 mg Oral Daily    predniSONE  10 mg Oral QAM    Followed by    [START ON 11/6/2023] predniSONE  5 mg Oral QAM    sulfamethoxazole-trimethoprim  1 tablet Oral Once per day on Monday Wednesday Friday    tacrolimus  0.5 mg Oral BID IS    triamcinolone   Topical BID    valGANciclovir  450 mg Oral Q Mon Wed Fri AM    Vitamin D3  50 mcg Oral Daily      heparin       MERRICK Coronado

## 2023-11-03 NOTE — PROGRESS NOTES
CLINICAL NUTRITION SERVICES - REASSESSMENT NOTE     Nutrition Prescription    RECOMMENDATIONS FOR MDs/PROVIDERS TO ORDER:  Dialysis diet as tolerated     Malnutrition Status:    Unable to determine due to unable to perform all aspects of NFPA    Recommendations already ordered by Registered Dietitian (RD):  [facility] once per day @ 2:00 pm     Future/Additional Recommendations:  PO adequacy       EVALUATION OF THE PROGRESS TOWARD GOALS   Diet: Dialysis  Intake: patient off floor in HD, not available to see  Per flow sheet review, patient with good po and appetite, consuming 100% of meals        NEW FINDINGS   PMHx: significant for 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 Access: L AVF.  - likely discharging today 11/3 or   - Edema: Edema trace to 1+ LUE     Meds:  Phoslo 2 tabs tid WM     Wt trend:  Standing admit wt: 66.5 kg (146 lb 8 oz) on 10/29  Most recent wt / driest wt: 60 kg (132 lb 4.8 oz) at noon on 11/3 ( standing wt)  EDW 59.7 kg.     Labs noted:   BUN: 24.6 (H), Cr: 3.68 (H), GFr: 13 (L) > on HD   CRP: 139 ( 10/28)  B (H)    Stool:  X2 ( last BM on )      MALNUTRITION  % Intake: No decreased intake noted  % Weight Loss: Weight loss does not meet criteria  Subcutaneous Fat Loss: Unable to assess  Muscle Loss: Unable to assess  Fluid Accumulation/Edema: Mild  Malnutrition Diagnosis: Unable to determine due to patient off floor in HD     Previous Goals   Patient to consume % of nutritionally adequate meal trays TID, or the equivalent with supplements/snacks.   Evaluation: Met    Previous Nutrition Diagnosis  ncreased kcal/protein needs related to infection /  respiratory status/ HD repletion as evidenced by estimated need above    Evaluation: No change    CURRENT NUTRITION DIAGNOSIS  ncreased kcal/protein needs related to infection / respiratory status/ HD repletion as evidenced by estimated needs     INTERVENTIONS  Implementation  Medical food supplement therapy  Provided patient yesterday with renal / dialysis menu to order appropriate meals per patient's request     Goals  Patient to consume % of nutritionally adequate meal trays TID, or the equivalent with supplements/snacks.    Monitoring/Evaluation  Progress toward goals will be monitored and evaluated per protocol.      Natan Pearson RD/STEPHANY  [address] RD pager: [phone]  Weekend/Holiday RD pager: [phone]

## 2023-11-03 NOTE — PROGRESS NOTES
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  The patient was admitted on 10/25/2023 with malaise, cough, and dyspnea along with 2 weeks of new hypoxia, CT chest concerning for uncontrolled infection, however primary problem was bronchial stenosis.  IP bronch with dilation , will require custom stent.  Additionaly with LUE swelling and left breast changes, now resolving  s/p venous fistulogram ().  Continues to require 1-3 L O2 via NC.  Planning for discharge .       Discharge Recommendations:  - Nebs:  Xopenex TID and Mucomyst TID.  Pulmicort daily PRN.  - Outstanding labs:  Tacrolimus level () pending.  Posaconazole level (11/3) pending (of note, the 11/3, 12:22 level will be accurate;  disregard the 11/3, 05:56 level).  - Routine labs:  CMV weekly to continue for now until two consecutive CMV levels in a row one week apart, then CMV every other week (negative on ).  - Future labs:  In ~1 week, obtain CMP, CBC w/diff, tacrolimus, and CMV.  At next OP appt on , obtain CMP, CBC w/diff, tacrolimus, CMV, EBV, and ImmuKnow (additionally, repeat posa level if dose is changed).  - IST:  Tacrolimus 0.5 mg BID (goal 8-10).  AZA currently on HOLD (since  for pancytopenia), to be resumed at decreased dose of 50 mg daily on .  Prednisone home/chronic dose of 5 mg daily to be resumed on  (currently  finishing pred taper 10/28-11/5 for rash).  - PPX:  Bactrim MWF for PJP ppx (lifelong).  Posaconazole 300 mg daily for Aspergillus ppx (lifelong),  increase dose to 400 mg daily if repeat  posa level (11/3, 12:22) is subtherapeutic.  Augmentin for ~2 weeks following all future IP bronchs that require dilation (or alternatively, 1 week out of each month) per txp ID.  - ABX:  Augmentin through 11/7.  Continue PTA Valcyte 450 mg MWF after HD.    - CLAD:  Singulair, Advair, and azithromycin (azithro held briefly for prolonged QTc, now resolved).  - Notable cultures:  Stenotrophomonas on BAL (11/1), per txp ID no need to treat.  - Imaging:  Repeat chest CT at OP clinic appt 11/21.  - Procedures:  IP bronchoscopy with custom silicone stent, likely first week of December.  - PFTs:  Deferred until OP clinic appt 11/21.  - Other:  Continue MWF  HD.  PTA home O2 to continue.  Follow-up of LUE / left breast per PCP.    - Outpatient follow-up:  Scheduled 11/21 with pulmonary transplant.  Additionally transplant ID on 12/1.      Acute hypoxic respiratory failure:  Right main bronchial stenosis s/p recurrent dilatation:   Concern for pneumonia:  Follows with IP for serial bronchs with dilation.  Progressive malaise, productive cough of clear sputum, and dyspnea for 2 months with occasional wheezing.  New hypoxia at home (2L NC x 2 weeks).  S/p bronch/BAL (8/17) with Steno s/p Levaquin and Bactrim without improvement.  Bronch/BAL (10/17) with RM stenosis dilation (improvement x1 day then return of sx) and Actinomyces sp., started on Augmentin the day PTA.  Admission workup notable for low grade temp & leukocytosis, CT chest (10/24) with worsening consolidation bilaterally concerning for worsening infection and mild air trapping.  DDx includes pneumonia/infection, stenosis, worsening CLAD, ACR, cardiac/hypervolemia (missed HD 10/25).  PE ruled out (CT PE 10/27 with no PE).  Also with significant LUE swelling 10/27, see below.  IP  bronch (11/1) with dilation but no stent placement.  Chest CT (11/1) with stable loculated left pleural effusion and trace right pleural effusion, ongoing pulmonary opacities, and similar level of right main bronchus narrowing.  No significant improvement in symptoms with current ABX.  Increasingly wheezy 11/2 but per IP, no need for repeat bronch/dilation.  Continues to require 1-3 L O2 via NC.    - Supplemental O2 to maintain SpO2>92%, wean as able  - Nebs: Xopenex TID & Mucomyst TID, Pulmicort daily PRN, additionally encourage IS and Aerobika  - CXR ordered 11/4 (will be reviewed by our team on day of discharge)  - IP planning for custom silicone stent, likely first week of December (outpatient team coordinating)  - Fungal, AFB, and Nocardia cultures from bronch (11/1) NGTD  - Bacterial culture from bronch (11/1) with Stenotrophomonas, per transplant ID no need to treat given that pt is known to be colonized with Steno (8/17/23) and given respiratory improvement/stability  - Histo urine Ag, UA/UC ordered pending collection (oliguric)  - ABX: Augmentin (11/2-11/7 for total ABX course of 2-weeks, s/p IV Zosyn (10/25-11/1));  s/p minocycline (10/25-10/26) and vancomycin (10/25)   - Per transplant ID: recommend course of Augmentin for ~2 weeks following all future IP bronchs that require dilation (or alternatively, 1 week out of each month)  - Follow-up as OP 11/21 in pulm clinic with repeat chest CT     S/p bilateral sequential lung transplant (BSLT) for ILD: Pulmonary clinic visit with Ame Chow 10/24 with increased cough and dyspena, occasional wheezing, as above.  PFTs decreased.  DSA negative 8/8.  IgG adequate 979 on 10/27.  DSA (10/27) negative.       Immunosuppression: Recent ImmuKnow 479 on 9/7, suggesting increased risk of rejection, repeat 412 on 10/27, indicating normal immune response.    - Tacrolimus 0.5 mg BID (held then resumed at dec. dose 10/31).  Goal level 8-10.  Level (11/2) subtherapeutic at  6.8, however unable to interpret given that pt missed her AM dose on 11/1.  Repeat level ordered for 11/4 to obtain steady state level (to be reviewed by our team on day of discharge if resulted in time, otherwise OP transplant coordinators will review/adjust).    - AZA on HOLD as of 11/1 for pancytopenia, resume at decreased dose of 50 mg daily on 11/8 after ABX regimen is complete on 11/7 (PTA dose was 75 mg daily)  - Prednisone chronic dose on hold with steroid burst/taper (10/28-11/5 as below), chronic dose scheduled to resume 11/6  - Repeat ImmuKnow ~11/24 (will obtain early on 11/21 at OP appt)     Prophylaxis:   - Bactrim MWF for PJP ppx     CLAD: PTA Breo (hospital substitute for Advair), Singulair, and azithromycin held 10/27 given prolonged QTc of 512 --> repeat QTc 10/30 improved to 455, azithro resumed 10/30, repeat QTc on 11/2 was 465.       Left-sided Aspergillus empyema: Noted in 2019 s/p needle aspiration with Aspergillus fumigatus on cultures s/p intrapleural amphotericin bead placement.  Posaconazole indefinitely per transplant ID, most recent level 1 on 2/9.  Fungitell (10/27) and A. galactomannan (10/26) negative.  - Continue PTA posaconazole 300 mg daily, level (10/27) subtherapeutic at 0.4, per transplant ID, repeat posaconazole trough level 11/3 - ordered initially for early AM, but not full trough, so reordered per primary for noon.  If repeat trough (the level timed 12:22) remains low, increase dose to 400 mg daily.     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  Markedly increased to 2,450 on 10/4 and peaked at 2,680 on 10/11.  Down to  on 10/24, then <35 on 10/31, and now (11/6) negative for the first time since August 2023.    - CMV weekly next ~11/13;  per lung txp protocol, if two consecutive CMV levels in a row one week apart, can decrease frequency of CMV levels to every other week with consideration of maintenance/ppx  therapy vs. completion/discontinuation of therapy, per OP provider  - Continue PTA VGCV induction dosing 450 mg MWF after HD (decreased 11/1 per transplant ID back to 450mg MWF after HD), revisit as OP as above     EBV viremia: EBV 42k with log 4.6 on 8/8, then <35 on 10/11, now 10k log 4.0 on 10/28.    - Repeat EBV next ~11/13 (ok to defer to 11/21 OP appt per Dr. Orona)     Other relevant problems being managed by the primary team:     Pancytopenia:  Etiology unclear, could be due to AZA and/or worsening leukopenia (11/1) could be due to increased dose of VGCV per transplant ID.  VGCV dose decreased back to 450mg MWF on 11/1 per transplant ID.  S/p Neupogen (11/1).  WBC improving, 1.6 on 11/1 -> 8.8 on 11/3 and  ANC 1 on 11/1 -> 7.7 on 11/3.    - Resume AZA on 11/8 once ABX course has finished (on hold since 11/1)     Diffuse erythema/pruritus:  New diffuse erythema noted 10/25, initially though to be related to ABX (vancomycin-infusion syndrome) although initially derm consult with concern for dermatomyositis.  Rheumatology consulted 10/28, likely drug reaction (unlikely dermatomyositis).    - Rheumatology recommended prednisone burst/taper (10/28), full taper ordered by primary through 11/5, chronic dose of prednisone ordered to resume 11/6     LUE swelling, Resolving:  Left breast changes, Resolving:  CT chest (10/24) with new prominent left upper lobe anterior pleural-based soft tissue thickening.  LUE US (10/27) without DVT although noted left cervical lymphadenopathy and subcutaneous soft tissue thickening in the distal upper extremity.    CT PE (10/27) with left breast skin thickening and breast edema.  CT AP (10/28, given left lymphadenopathy/extremity swelling) with basilar airspace consolidation and superimposed pulmonary edema, asymmetric anasarca (L>R) and trace ascites, mildly prominent retroperitoneal lymphadenopathy, and cholelithiasis without acute cholecystitis.  MRI (11/1) with loculated L  pleural effusion, small free-flowing R pleural effusion, L supraclavicular lymph nodes, similar to recent CT, L breast edema/L chest wall edema, and L breast skin thickening.  US completed (11/1) suggestive of central venous stenosis or occlusion.  Repeat chest CT (11/1) again with left breast subcutaneous edema and skin thickening with adjacent lymphadenopathy, and unchanged axillary, mediastinal, and supraclavicular lymphadenopathy.  Also with increased breast density, leathery skin texture, and peau d'orange appearance noted on exam 11/2.  Fistulogram (11/2) with narrowing of left innominate vein/medial subclavian vein with collaterals, venoplasty to 10 then 12 mm.  LUE swelling and left breast changes markedly improved 11/3 after fistulogram.  - Follow-up per primary team     ESRD on iHD (since 10/2019): On MWF iHD.  Renal transplant evaluation on hold currently for infection and worsening PFTs of unknown etiology.  Missed dialysis run on 10/25.  Management per nephrology.      We appreciate the excellent care provided by the [facility] team.  Recommendations communicated via in person rounding and this note.  Will continue to follow along closely, please do not hesitate to call with any questions or concerns.    Patient discussed with Dr. Yonny Orona.      ADRIÁN Fernandes, CNP  Pulmonary Transplant/CF Nurse Practitioner     Subjective & Interval History:     Slept great, breathing comfortably on 1 L O2.  Cough productive of clear phlegm, easily expectorated, especially after nebs.  LUE swelling has decreased significantly, pt feels her left breast is lighter-weight and less dense than yesterday.  BMs are improving, now only ~3-4/day and consistency improving to become more semi-formed.  HD today.  Feels ready to go home tomorrow.      Review of Systems:     C: No pain, fever, chills, or night sweats.  + decreasing weight.    SKIN:  + Ongoing diffuse rash.  HEENT: No visual changes, sinus/nasal  congestion, nasal drainage, sore throat, or difficulty swallowing.  PULM: See interval history  CV: See interval hisotry  GI: No heartburn or acid reflux.  No nausea or vomiting.  + loose stool, improving.  :  Oliguric.  MUSCULOSKELETAL: No myalgias or arthralgias.  ENDOCRINE: Blood sugars with adequate control.  NEURO: No headache, numbness/tingling, or dizziness/light-headedness.  PSYCHIATRIC: Mood stable.    Physical Exam:     All notes, images, and labs from past 24 hours (at minimum) were reviewed.    Vital signs:  Temp: 97.9  F (36.6  C) Temp src: Oral BP: 128/73 Pulse: 104   Resp: 16 SpO2: 99 % O2 Device: Nasal cannula Oxygen Delivery: 1 LPM   Weight: 61.7 kg (136 lb 0.4 oz)  I/O:   Intake/Output Summary (Last 24 hours) at 11/3/2023 1149  Last data filed at 11/3/2023 1123  Gross per 24 hour   Intake 480 ml   Output 1700 ml   Net -1220 ml     C: Lying in bed, supine, in no apparent distress.   HEENT: Pupils equal, round, conjunctivae pink, antiicteric sclerae. Oral mucosa moist without exudate.    PULM: Expiratory wheezing in RUL, coarse crackles in RML/RLL.  Left lung sounds clear without any adventitious sounds.  Non-labored breathing on  1 L O2 via nasal cannula.   CV: S1/S2 RRR, with no murmurs, gallops, or rubs.  Minimal bilateral ankle edema.  LUE edema significantly decreased, now only mild to moderate.  ABD: NABS, soft, nontender, nondistended.   MSK: Moves all extremities. No apparent muscle wasting.   SKIN: Warm, dry.  + Diffuse flaky erythematous rash.  LUE skin no longer appearing tight/stretched.  Left breast skin still with some peau d'orange appearance but significantly less so.  Density of left breast much more closely matching right breast today.  NEURO: Alert and conversant.  PSYCH: Calm, appropriate, pleasant and cooperative.    Data:     LABS    CMP:   Recent Labs   Lab 11/03/23  0556 11/02/23  0734 11/01/23  0722 10/31/23  0606    135 139 137   POTASSIUM 3.6 4.0 3.2* 3.6   CHLORIDE  "103 102 101 101   CO2 23 24 26 26   ANIONGAP 11 9 12 10   * 140* 90 130*   BUN 24.6* 12.3 19.0 21.2   CR 3.68* 2.23* 2.72* 2.60*   GFRESTIMATED 13* 25* 19* 20*   CHELSEY 8.2* 8.5* 8.6* 8.2*   MAG  --   --  1.5*  --    PHOS 3.8 3.5 2.6 3.1   PROTTOTAL 5.9* 5.8* 5.8* 5.3*   ALBUMIN 3.1* 3.1* 3.0* 2.9*   BILITOTAL 0.5 0.6 0.7 0.6   ALKPHOS 140* 153* 131* 124*   AST 14 18 14 12   ALT 20 20 19 19     CBC:   Recent Labs   Lab 11/03/23  0556 11/02/23  0734 11/01/23  0722 10/31/23  0606   WBC 8.8 5.7 1.6*  1.6* 2.1*  2.1*   RBC 2.39* 2.39* 2.41* 2.31*   HGB 7.9* 7.8* 8.0* 7.6*   HCT 24.8* 24.8* 25.3* 23.8*   * 104* 105* 103*   MCH 33.1* 32.6 33.2* 32.9   MCHC 31.9 31.5 31.6 31.9   RDW 17.9* 17.5* 17.2* 17.2*    153 143* 138*       INR: No lab results found in last 7 days.    Glucose:   Recent Labs   Lab 11/03/23  0556 11/02/23  0734 11/01/23  0722 10/31/23  0606 10/30/23  1706 10/30/23  0652   * 140* 90 130* 134* 116*       Blood Gas: No lab results found in last 7 days.    Culture Data No results for input(s): \"CULT\" in the last 168 hours.    Virology Data:   Lab Results   Component Value Date    FLUAH1 Not Detected 11/01/2023    FLUAH3 Not Detected 11/01/2023    FC5551 Not Detected 11/01/2023    IFLUB Not Detected 11/01/2023    RSVA Not Detected 11/01/2023    RSVB Not Detected 11/01/2023    PIV1 Not Detected 11/01/2023    PIV2 Not Detected 11/01/2023    PIV3 Not Detected 11/01/2023    HMPV Not Detected 11/01/2023    HRVS Negative 01/24/2021    ADVBE Negative 01/24/2021    ADVC Negative 01/24/2021    ADVC Negative 12/23/2020    ADVC Negative 10/07/2019       Historical CMV results (last 3 of prior testing):  Lab Results   Component Value Date    CMVQNT <35 (A) 10/31/2023    CMVQNT Not Detected 07/11/2023    CMVQNT Not Detected 04/06/2023     Lab Results   Component Value Date    CMVLOG <1.5 10/31/2023    CMVLOG 2.0 10/24/2023    CMVLOG 1.9 10/24/2023       Urine Studies    Recent Labs   Lab Test " 01/24/21  1729 10/21/19  2240   URINEPH 5.0 5.0   NITRITE Negative Negative   LEUKEST Moderate* Large*   WBCU 34* 115*       Most Recent Breeze Pulmonary Function Testing (FVC/FEV1 only)  FVC-Pre   Date Value Ref Range Status   10/24/2023 0.96 L    09/07/2023 1.25 L    08/08/2023 1.35 L    07/11/2023 1.47 L      FVC-%Pred-Pre   Date Value Ref Range Status   10/24/2023 33 %    09/07/2023 42 %    08/08/2023 46 %    07/11/2023 50 %      FEV1-Pre   Date Value Ref Range Status   10/24/2023 0.87 L    09/07/2023 1.07 L    08/08/2023 1.12 L    07/11/2023 1.16 L      FEV1-%Pred-Pre   Date Value Ref Range Status   10/24/2023 37 %    09/07/2023 46 %    08/08/2023 48 %    07/11/2023 49 %        IMAGING    Recent Results (from the past 48 hour(s))   CT Chest w Contrast    Narrative    EXAM: CT CHEST W CONTRAST 11/1/2023 9:14 PM    HISTORY: 62 years Female Evaluate for possible clot/mass/lymphatic  obstruction/inflammatory cause of LUE swelling, please include LUE    COMPARISON: CT chest 10/27/2023, MR chest 10/31/2023.    TECHNIQUE: Helical CT imaging of the chest was obtained with contrast.  Multiplanar post-processed and MIP reformats were obtained reviewed.  The left upper extremity was additionally included within the  field-of-view.    FINDINGS:  LINES/TUBES: None.    MEDIASTINUM: Prominent left inferior cervical lymph nodes. Mildly  enlarged bilateral pulmonary window lymph node measuring 1.0 cm,  subcarinal node measuring 1.1 cm an additional subcentimeter  paratracheal lymph nodes. Normal size/configuration of the great  vessels. Normal heart size. No pericardial effusion. Atherosclerotic  calcifications of the aortic arch. Patent vascular anastomoses.    LUNG: Postsurgical change of bilateral lung transplantation. The  bronchial anastomoses are patent. There is unchanged mild narrowing of  the right mainstem bronchus just distal to the anastomosis. Similar  appearance of patchy peribronchial vascular opacities  predominantly in  the right lower lobe, and to a lesser degree the left upper lobe, left  lower lobe, and right middle lobe. There is decreased septal  thickening in the right upper lobe.     PLEURA: No pneumothorax. Small loculated left pleural effusion. Trace  right pleural effusion. Unchanged peripherally calcified 2.6 x 1.7 cm  soft tissue density within the inferior medial left pleural space.    LOWER NECK: Thyroid unremarkable.    UPPER ABDOMEN: Esophagus appears unremarkable. Cholelithiasis with  mildly prominent gallbladder wall and small volume pericholecystic  fluid. Mildly dilated common bile duct measuring up to 1.1 cm. Mild  dilation of the main pancreatic duct without visualized pancreatic  mass.    BONES: No acute osseous abnormality. No suspicious bony lesions.    SOFT TISSUES: Overlying subcutaneous thickening and soft tissue fat  stranding involving the left breast.     Lymph nodes: Ongoing prominent and enlarged axillary lymph nodes. For  example there is a 1.5 x 1.1 cm left axillary lymph node (series 4,  image 80), previously 1.6 x 1.1 cm, as well as a right axillary lymph  node measuring 1.6 x 1.1 cm (series 4, image 64), previously 1.4 x 1.1  cm. Numerous prominent supraclavicular and mediastinal lymph nodes,  unchanged compared to prior.    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  Vasculature appears patent. 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  Soft tissue swelling throughout the left upper extremity.      Impression    IMPRESSION:   1.  The left upper extremity vasculature, including the arteriovenous  fistula graft, appear patent with known pseudoaneurysms in the upper  and mid arm. Left upper extremity subcutaneous edema. No additional  findings identified in the left upper extremity to explain patient's  symptoms.  2.   Ongoing left breast subcutaneous edema and skin thickening with  adjacent lymphadenopathy, likely infectious/inflammatory.  3.  Unchanged axillary, mediastinal, and supraclavicular  lymphadenopathy.  4.  Cholelithiasis with pericholecystic fluid, mild wall thickening,  and mild dilation of the common bile duct and main pancreatic duct.  Recommend correlation with clinical symptoms. Consider further  evaluation with ultrasound versus MRCP if concern for  cholecystitis/biliary obstruction.  5.  Ongoing but slightly improved pulmonary opacities compared to  10/27/2023. Differential includes pulmonary edema and/or infection.  6.  Stable loculated left pleural effusion and trace right pleural  effusion.    I have personally reviewed the examination and initial interpretation  and I agree with the findings.    SUNITA ADKINS MD         SYSTEM ID:  [id number]   IR Dialysis Fistulogram Left    Narrative    PROCEDURE: Dialysis circuit evaluation and interventions 11/2/2023    Procedural Personnel  Attending physician(s): LLUVIA Rodriguez MD  Fellow physician(s): BRODERICK Urbano MD  Resident physician(s): None  Advanced practice provider(s): None    Pre-procedure diagnosis: Functioning fistulagram, arm swelling  Post-procedure diagnosis: Same  Indication: Arm swelling  Additional clinical history: None    Complications: No immediate complications.      Impression    IMPRESSION:  Fistulagram demonstrates significant recurrent narrowing of the left  innominate vein/medial subclavian vein with significant chest  collaterals. Segment was venoplastied to 10 mm followed by application  of 12 mm drug coated balloon with good angiographic results.     Plan:  The graft can be used for dialysis immediately.  _______________________________________________________________    PROCEDURE SUMMARY:  - Access of dialysis circuit with ultrasound guidance  - Procedure: Angiogram and central segment angioplasty  - Additional procedure(s): None    PROCEDURE  DETAILS:    Pre-procedure  Consent: Informed consent for the procedure including risks, benefits  and alternatives was obtained and time-out was performed prior to the  procedure.  Preparation: The site was prepared and draped using maximal sterile  barrier technique including cutaneous antisepsis.    Anesthesia/sedation  Level of anesthesia/sedation: Moderate sedation (conscious sedation)  Anesthesia/sedation administered by: Independent trained observer  under attending supervision with continuous monitoring of the  patient?s level of consciousness and physiologic status  Total intra-service sedation time (minutes): 38  4mg zofran IV pre procedure, 2 mg versed IV, 100 mcg fentanyl IV?    Initial dialysis circuit evaluation  Dialysis circuit side: Left  Dialysis circuit type: Upper arm loop graft  Initial circuit palpation: Pulse but no thrill    Access (towards outflow)  Local anesthesia was administered. The dialysis circuit was  sonographically evaluated and judged to be patent. Real time  ultrasound was used to visualize needle entry into the dialysis  circuit and a permanent image was stored..   Access technique: Micropuncture set with 21 gauge needle  Sheath size (Qatari): 7    Initial angiography  Initial angiography of the dialysis circuit, outflow veins, and  central veins was performed.  Findings: Fistulagram demonstrates significant recurrent narrowing of  the left innominate vein/medial subclavian vein with significant chest  collaterals.     Angioplasty  Location: Left innominate vein/medial SCV  Angioplasty balloon: 10 mm Galt followed by 12 mm Lutonix  Angioplasty balloon type: Conventional followed by Lutonix    Final angiography  Findings: Significant caliber improvement of segment and resolution of  chest wall collaterals  Final dialysis circuit palpation: Palpable thrill    Reflux angiogram showed axillary artery anastomosis with no evidence  of stenosis.    Closure (outflow)  The sheath was  removed and hemostasis was achieved with an external  device. A sterile dressing was applied.    Radiation Dose  Fluoroscopy time (minutes): 4.1    Reference air kerma (mGy): 6.0   Kerma area product (uGy-m2): 694.87      Additional Details  Additional description of procedure: None  Registry event: V/3/g  Device used: None  Equipment details: None  Unique Device Identifiers: Not available  Specimens removed: None  Estimated blood loss (mL): Less than 10  Standardized report: [email or web address]tion_v3.1    Attestation  Signer name: CLAUDINE ESTRADA MD  I attest that I was present for the key elements of the procedure and  immediately available. I reviewed the stored images and agree with the  report as written.    I have personally reviewed the examination and initial interpretation  and I agree with the findings.    CLAUDINE ESTRADA MD         SYSTEM ID:  [id number]   XR Chest 1 View    Narrative    XR CHEST 1 VIEW  11/2/2023 9:40 AM     HISTORY:  s/p BSLT in 2018; bronchoscopy with right middle lobe  bronchus and balloon dilation 10/17/2023.    COMPARISON:  CT chest 11/1/2023    TECHNIQUE: XR CHEST 1 VIEW    FINDINGS:   Postsurgical changes related to bilateral lung transplant in 2018.  Low lung volumes with mixed hazy airspace and reticular opacities  diffusely in the left greater than right bilateral lungs. Moderate  right and small/moderate left pleural effusions with opacification of  the bilateral hemidiaphragms and bibasilar atelectasis.  No pneumothorax. Cardiac silhouette is normal.      Impression    IMPRESSION:  Moderate right and small/moderate left pleural effusions with  bibasilar atelectasis. Scattered hazy airspace opacities which may  reflect pulmonary edema versus superimposed infectious process, in the  right clinical context    I have personally reviewed the examination and initial interpretation  and I agree with the findings.    ADAM GONZALEZ MD         SYSTEM ID:   [id number]   US Abdomen Limited    Narrative    EXAMINATION: Limited Abdominal Ultrasound, 11/2/2023 9:52 AM     COMPARISON: CT abdomen and pelvis 10/28/2023    HISTORY: Cholelithiasis on CT, elevated alk phos, RUQ US    FINDINGS:     Fluid: No evidence of ascites or pleural effusions.    Liver: The liver demonstrates increased echogenicity, measuring 19.9  cm in craniocaudal dimension. There is no focal mass.     Gallbladder: Cholelithiasis/tumefactive sludge. No gallbladder wall  thickening or pericholecystic fluid. Negative sonographic Swanson sign.    Bile Ducts: Both the intra- and extrahepatic biliary system are of  normal caliber.  The common bile duct measures 7 mm in diameter.    Pancreas: Visualized portions of the head and body of the pancreas are  unremarkable. Pancreas is partially obscured    Kidney: The right kidney measures 7.8 cm long. Simple cyst of the  right kidney measuring 1.4 x 1.7 x 1.1 cm. Echogenic appearance of the  renal parenchyma. There is no hydronephrosis or hydroureter, or no  shadowing renal calculi.      Impression    IMPRESSION:       1. Increased hepatic parenchymal echogenicity which may be seen with  parenchymal liver disease including hepatic steatosis.  2. Mild hepatomegaly. No focal liver lesion demonstrated.  3. Cholelithiasis/tumefactive sludge with borderline gallbladder wall  thickening, no additional signs for cholecystitis  4. Common bile duct size is at upper limits of normal measuring 7 mm.  No obstructing lesion seen in the visualized common bile duct  5. Small right kidney with increased parenchymal echogenicity which  may be seen with chronic renal parenchymal disease    I have personally reviewed the examination and initial interpretation  and I agree with the findings.    EDIE VILLA MD         SYSTEM ID:  [id number]

## 2023-11-03 NOTE — PROGRESS NOTES
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   Continue amox/clav with plans to complete a total of 10-14 days of antibiotics for post-obstructive pneumonia (End date: 11/3 at the earliest)  Continue with valganciclovir 450 mg 3 times weekly to be timed after HD. Her last CMV viral load was negative, so awaiting next CMV VL on 11/7.   Continue posaconazole at 300 mg/day. If repeat trough remains low, would increase her dose of posaconazole to 400 mg/day.   TMP-SMX, and azithromycin for prophylaxis. '    Thank you for this consult. ID will sign off at this time. Please do not hesitate to call back with additional questions or concerns.     I spent >50 minutes on direct patient care or coordinating patient care activities.     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 Repeat BAL from her follow-up dilatations on 11/1 now growing Stenotrophomonas.  Last grew from BAL on 8/17/2023 and was treated with TMP/SMX. Given her significant respiratory improvement without treatment, will treat this as a colonizing organism. Consequently, there is no need to treat the Steno at this time.     #Erythrodermic skin rash, diffuse, pruritus, after receiving vancomycin + pip-tazo  Responded well to diphenhydramine, so it seemed consistent with vanco-infusion syndrome, but given her history of autoimmune disease (dermatomyositis), and the fact the rash persisted for quite awhile, dermatology is involved who feel it may resemble recurrence of autoimmune disease and are considering skin biopsy. Updated allergy list. Her rash has continued to improve while still on pip-tazo.    #CMV viremia  #Leukopenia  Low-grade viremia since 8/2023. She had a level up to log 521 (log 2.7) on 9/7/2023, though was not started on VGCV (three times weekly after HD) until approx 10/6/2023. She remains low-level viremic on 10/17 and 10/24 (log 1.9). She was undetectable throughout rest of 2022 and 2023. She reports taking VGCV in the past for \"cold sores\" for short courses, though I wonder if she means valacyclovir. Either way, she may be at risk for resistance. Increased dose to daily and she is now progressively leukopenic; ANC 1.4 on 10/31 and 1.0 on 11/1, so would transition back to three-times-per week dosing.     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 BAL showed septate hyphae. 7/2020 had hypodense mass in left lung with biopsy showing fungal hyphae, cx aspergillus. Started on voriconazole in 2019, changed to posaconazole in 2020, she remains on 300 mg daily.  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 Recovery from this required tracheostomy.  2019 - She had also grown Actinomyces from multiple BAL    - QTc interval: 420 msec (10/25/2023)   - Bacterial prophylaxis: Azithromycin  - Pneumocystis prophylaxis: TMP-SMX for life (hx PJP)  - Serostatus & viral prophylaxis: CMV D+/R+, EBV D+/R+  - Fungal prophylaxis: posaconazole   - Immunization status: Seasonal influenza, RSV, and COVID vaccine  - Gamma globulin status: 979 10/27/2023  - Isolation status: Hx MRSA - contact         Summary of presentation:   59F with history of ILD, seronative RA, dermatomyositis s/p bilateral lung transplant 3/2018 with post transplant course c/b left aspergilus empyema (2019, on posaconazole), CMV viremia, ESRD on HD, and 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 She is feeling well today without any new complaints or concerns. Eager to leave tomorrow.     Transplants:  3/1/2018 (Lung), Postoperative day:  2073.  Coordinator Kacy Martínez           Current Medications & Allergies:      acetylcysteine  2 mL Nebulization 4x Daily    amoxicillin-clavulanate  1 tablet Oral Q24H    [Held by provider] azaTHIOprine  50 mg Oral QPM    azithromycin  250 mg Oral Daily    [Held by provider] calcium acetate  667 mg Oral TID w/meals    fluticasone-vilanterol  1 puff Inhalation Daily    heparin ANTICOAGULANT  5,000 Units Subcutaneous Q12H    hydrocortisone   Topical BID    HYDROmorphone  0.2 mg Intravenous Once    levalbuterol  1.25 mg Nebulization 4x Daily    [Held by provider] metoprolol tartrate  25 mg Oral or Feeding Tube BID     midodrine  10 mg Oral TID w/meals    montelukast  10 mg Oral At Bedtime    multivitamin RENAL  1 tablet Oral Daily    pantoprazole  40 mg Oral QAM AC    posaconazole  300 mg Oral Daily    predniSONE  10 mg Oral QAM    Followed by    [START ON 11/6/2023] predniSONE  5 mg Oral QAM    sulfamethoxazole-trimethoprim  1 tablet Oral Once per day on Monday Wednesday Friday    tacrolimus  0.5 mg Oral BID IS    triamcinolone   Topical BID    valGANciclovir  450 mg Oral Q Mon Wed Fri AM    Vitamin D3  50 mcg Oral Daily            Physical Exam:     Patient Vitals for the past 24 hrs:   BP Temp Temp src Pulse Resp SpO2 Weight   11/03/23 1130 128/73 -- -- 104 -- 99 % --   11/03/23 1123 122/67 -- -- 94 16 100 % --   11/03/23 1115 120/81 -- -- 94 -- 100 % --   11/03/23 1100 119/68 -- -- 89 -- 100 % --   11/03/23 1045 119/72 -- -- 100 -- 100 % --   11/03/23 1030 119/70 -- -- 90 -- 100 % --   11/03/23 1015 115/67 -- -- 96 -- 100 % --   11/03/23 1000 116/71 -- -- 94 -- 100 % --   11/03/23 0945 120/71 -- -- 93 -- 100 % --   11/03/23 0930 113/69 -- -- 90 -- 100 % --   11/03/23 0915 126/81 -- -- 93 -- 100 % --   11/03/23 0905 137/76 -- -- 93 -- 99 % --   11/03/23 0900 137/76 -- -- 82 -- 100 % --   11/03/23 0845 131/84 -- -- 89 -- 100 % --   11/03/23 0830 125/76 -- -- 89 -- 100 % --   11/03/23 0815 122/70 -- -- 99 -- 100 % --   11/03/23 0800 124/69 -- -- 89 -- 100 % --   11/03/23 0752 -- -- -- -- -- -- 61.7 kg (136 lb 0.4 oz)   11/03/23 0750 114/70 -- -- 101 -- 100 % --   11/03/23 0745 119/66 -- -- 93 -- 100 % --   11/03/23 0736 132/78 97.9  F (36.6  C) Oral 86 16 100 % --   11/03/23 0656 94/43 98.3  F (36.8  C) Oral 90 20 93 % --   11/02/23 2230 118/59 97.8  F (36.6  C) Oral 120 18 100 % --   11/02/23 2030 -- -- -- -- -- 100 % --   11/02/23 1543 -- -- -- -- -- 100 % --   11/02/23 1414 105/67 98.2  F (36.8  C) Oral 98 20 94 % --     Ranges for vital signs:  Temp:  [97.8  F (36.6  C)-98.3  F (36.8  C)] 97.9  F (36.6  C)  Pulse:  []  "104  Resp:  [16-20] 16  BP: ()/(43-84) 128/73  SpO2:  [93 %-100 %] 99 %  Vitals:    11/01/23 0850 11/02/23 0804 11/03/23 0752   Weight: 61.8 kg (136 lb 3.2 oz) (P) 61.1 kg (134 lb 11.2 oz) 61.7 kg (136 lb 0.4 oz)       Physical Examination:  GENERAL:  Completing a nebulizer treatment today.    HEAD:  Head is normocephalic, atraumatic   EYES:  Eyes have anicteric sclerae without conjunctival injection   ENT:  No oral ulcers noted.   NECK:  Supple.   LUNGS:  Crackles noted throughout lungs bilaterally. Again no wheezes are noted.   CARDIOVASCULAR:  Regular rate and rhythm with no murmurs, gallops or rubs.  ABDOMEN:  Normal bowel sounds, soft, nontender.   SKIN:  Desquamation on skin continues to decrease. No new rashes noted. .  NEUROLOGIC:  Grossly nonfocal. Active x4 extremities         Laboratory Data:     No results found for: \"ACD4\", \"HIVPCR\"    Inflammatory Markers    Recent Labs   Lab Test 10/29/23  0642 10/28/23  1737 10/28/23  0725 10/27/23  0701 10/27/19  1147 10/07/19  1221 10/06/19  1444 09/13/19  0934 08/22/19  0820 05/16/18  1006   SED 66*  --  58*  --   --  93*  --  111*   < >  --    CRP  --   --   --   --   --   --  25.0* 58.0*   < >  --    CRPI  --   --  139.00*  --   --   --   --   --   --   --    G6PD  --   --   --   --  19.0*  --   --   --   --   --    RHF  --   --   --  22*  --   --   --   --   --   --    V3EEXZR  --  76*  --   --   --   --   --   --   --  113   Z9IGPAC  --   --   --  15  --   --   --   --   --   --     < > = values in this interval not displayed.       Immune Globulin Studies     Recent Labs   Lab Test 10/27/23  0701 10/24/23  1033 09/15/21  0915 01/31/21  0441 01/25/21  0406 10/27/19  0621 03/01/18  0356 02/19/18  0759     --  1,198 763  --  936 698 1,790*   IGM  --   --   --   --   --   --   --  502*   IGE  --  <2  --   --  <2  --   --  <2   IGA  --   --   --   --   --   --   --  425*   IGG1  --   --   --   --   --   --   --  1,300*   IGG2  --   --   --   --   --   " --   --  131*   IGG3  --   --   --   --   --   --   --  101   IGG4  --   --   --   --   --   --   --  1*       Metabolic Studies       Recent Labs   Lab Test 11/03/23  0556 11/02/23  0734 11/01/23  0722 10/31/23  0606 10/30/23  1706 10/28/23  1641 10/28/23  0914 10/28/23  0725 10/27/23  0701 10/26/23  0054 10/25/23  1356 06/27/22  1013 05/26/22  0750 10/22/19  1314 10/21/19  1752    135 139   < > 136   < >  --    < > 133*   < > 133*   < > 137   < > 133   POTASSIUM 3.6 4.0 3.2*   < > 3.4   < >  --    < > 3.5   < > 4.4   < > 3.5   < > 5.0   CHLORIDE 103 102 101   < > 99   < >  --    < > 95*   < > 96*   < > 96   < > 109   CO2 23 24 26   < > 25   < >  --    < > 26   < > 26   < > 32   < > 10*   ANIONGAP 11 9 12   < > 12   < >  --    < > 12   < > 11   < > 9   < > 13   BUN 24.6* 12.3 19.0   < > 11.0   < >  --    < > 16.0   < > 24.6*   < > 42*   < > 66*   CR 3.68* 2.23* 2.72*   < > 1.94*   < >  --    < > 3.00*   < > 4.90*   < > 3.98*   < > 5.76*   GFRESTIMATED 13* 25* 19*   < > 29*   < >  --    < > 17*   < > 10*   < > 12*   < > 8*   * 140* 90   < > 134*   < >  --    < > 100*   < > 92   < > 110*   < > 138*   A1C  --   --   --   --   --   --   --   --   --   --   --   --  5.2   < >  --    CHELSEY 8.2* 8.5* 8.6*   < > 7.9*   < >  --    < > 8.9   < > 8.3*   < > 9.5   < > 8.0*   PHOS 3.8 3.5 2.6   < >  --    < >  --    < > 2.3*   < >  --   --   --    < > 6.7*   MAG  --   --  1.5*  --   --   --   --   --   --    < >  --    < > 1.8   < > 2.0   LACT  --   --   --   --  0.9  --  0.7  --   --    < > 1.1   < >  --    < >  --    PCAL  --   --   --   --   --   --   --   --   --   --  0.70*  --   --    < >  --    FGTL  --   --   --   --   --   --   --   --  35  --   --   --   --    < >  --    CKT  --   --   --   --   --   --   --   --  69  --   --   --   --   --  70    < > = values in this interval not displayed.       Hepatic Studies    Recent Labs   Lab Test 11/03/23  0556 11/02/23  0734 10/25/23  1356 10/24/23  7939  07/11/23  0952 05/26/23  0828 05/27/21  1205 05/01/21  0654 01/27/21  0414 01/26/21  0425 01/24/21  1458 11/19/19  1453 11/11/19  1131   BILITOTAL 0.5 0.6   < > 0.9   < >  --    < >  --    < > 0.8 1.2   < > 0.4   24103  --   --   --   --   --  0.5   < >  --    < >  --   --    < >  --    DBIL  --   --   --  0.50*   < >  --    < >  --    < > 0.6*  --    < >  --    ALKPHOS 140* 153*   < > 306*   < >  --    < >  --    < > 184* 244*   < > 316*   00904  --   --   --   --   --  259*   < >  --    < >  --   --    < >  --    PROTTOTAL 5.9* 5.8*   < > 7.4   < >  --    < > 7.2   < > 6.0*  6.0* 6.1*   < > 7.2   96112  --   --   --   --   --  7.1   < >  --    < >  --   --    < >  --    ALBUMIN 3.1* 3.1*   < > 3.6   < >  --    < >  --    < > 2.0* 2.0*   < > 3.0*   61445  --   --   --   --   --  3.2*   < >  --    < >  --   --    < >  --    AST 14 18   < > 39   < >  --    < >  --    < > 11 31   < > 30   38214  --   --   --   --   --  31   < >  --    < >  --   --    < >  --    ALT 20 20   < > 31   < >  --    < >  --    < > 30 35   < > 27   85710  --   --   --   --   --  48   < >  --    < >  --   --    < >  --    LDH  --   --   --   --   --   --   --  164  --  187  --   --   --    GGT  --   --   --   --   --   --   --   --   --   --   --   --  510*   SALAS  --   --   --   --   --   --   --   --   --   --  <10*  --   --     < > = values in this interval not displayed.       Pancreatitis testing    Recent Labs   Lab Test 10/25/23  1356 05/26/22  0750 12/31/19  1033 10/24/19  2032 10/21/19  1451 10/06/19  1444 03/04/18  0353 02/19/18  0759   AMYLASE  --   --   --   --   --   --   --  58   LIPASE 24  --   --  212 119 172   < >  --    TRIG  --  155*   < >  --   --   --    < > 115    < > = values in this interval not displayed.       Gout Labs    No lab results found.    Hematology Studies   Recent Labs   Lab Test 11/03/23  0556 11/02/23  0734 11/01/23  0722 10/31/23  0606 10/28/23  0725 10/27/23  0701 10/17/23  1011 10/11/23  0806   WBC  8.8 5.7 1.6*  1.6* 2.1*  2.1*   < > 7.6   < >  --    38454  --   --   --   --   --   --   --  3.7*  3.7*   ANEU 7.7 5.0 1.0*  --    < >  --   --   --    ANEUTAUTO  --   --   --  1.4*  --  7.2   < >  --    ALYM 0.4* 0.4* 0.4*  --    < >  --   --   --    ALYMPAUTO  --   --   --  0.4*  --  0.1*   < >  --    SHERRI 0.3 0.2 0.2  --    < >  --   --   --    AMONOAUTO  --   --   --  0.3  --  0.1   < >  --    AEOS 0.2 0.0 0.1  --    < >  --   --   --    AEOSAUTO  --   --   --  0.1  --  0.1   < >  --    ABSBASO  --   --   --  0.0  --  0.0   < >  --    HGB 7.9* 7.8* 8.0* 7.6*   < > 9.3*   < >  --    86392  --   --   --   --   --   --   --  11.4*  11.4*   HCT 24.8* 24.8* 25.3* 23.8*   < > 29.4*   < >  --     153 143* 138*   < > 149*   < >  --    77723  --   --   --   --   --   --   --  167  167    < > = values in this interval not displayed.       Clotting Studies    Recent Labs   Lab Test 10/24/23  1033 08/16/23  0827 05/26/22  0750 09/15/21  0915 02/19/21  0458 02/12/21 0315   INR 0.99 0.9  0.9 0.96 0.96   < >  --    PTT  --   --   --   --   --  28    < > = values in this interval not displayed.       Iron Testing    Recent Labs   Lab Test 11/03/23  0556 02/12/21  0315 02/11/21  0645 02/04/21  0310 02/03/21 0415 12/14/18  0951 12/13/18  1033 09/04/18  1052 08/20/18  0553 08/02/18  1100 08/01/18  0921   IRON  --   --   --   --  51  --  16*  --   --   --  93   FEB  --   --   --   --  143*  --  221*  --   --   --  248   IRONSAT  --   --   --   --  36  --  7*  --   --   --  37   YOLA  --   --   --   --   --   --  302*  --   --   --  571*   *   < > 92   < > 91   < > 107*   < >  --    < > 101*   FOLIC  --   --   --   --   --   --   --   --  43.4  --   --    B12  --   --   --   --   --   --   --   --   --   --  1,753*   HAPT  --   --   --   --   --   --  296*  --   --   --   --    RETP  --   --  2.9*  --   --    < > 4.6*  --   --    < >  --    RETICABSCT  --   --  72.4  --   --    < > 94.2  --   --    < >  --      "< > = values in this interval not displayed.       Markers  No lab results found.    Invalid input(s): \"FETOPROTEIN\", \"SERUM\", \"AFP\"    Autoimmune Testing    Recent Labs   Lab Test 10/28/23  1737 10/28/23  0725 10/27/23  0701 05/16/18  1006 02/22/18  1800   RHF  --   --  22*  --   --    DNA  --  1.0  --   --   --    N3PLHEO 76*  --   --  113  --    N7VYMZI  --   --  15  --   --    RNPIGG Negative Negative  --  0.2  --    SMIGG Negative  --   --  <0.2  --    SSAIGG Negative  --   --  2.8*  --    SSBIGG Negative  --   --  7.3*  --    SCLIGG  --  Negative  --  <0.2 <0.2       Arterial Blood Gas Testing    Recent Labs   Lab Test 10/17/23  1035 02/10/21  2000 02/10/21  1555 02/09/21  1225 02/09/21  0403 02/08/21  1200   PH  --  7.41 7.36 7.38 7.42 7.40   PCO2  --  42 46* 49* 44 47*   PO2  --  90 105 74* 116* 72*   HCO3  --  26 26 29* 28 29*   O2PER 21.0 50 50 60 60.0 60        Thyroid Studies     Recent Labs   Lab Test 07/11/23  0952 08/01/18  0921 02/19/18  0759   TSH 1.23 1.80 3.07       Urine Studies     Recent Labs   Lab Test 01/24/21  1729 10/21/19  2240 09/12/19  0125 08/07/19  1512 03/04/18  1425   URINEPH 5.0 5.0 7.5* 7.0 5.5   NITRITE Negative Negative Negative Negative Negative   LEUKEST Moderate* Large* Negative Trace* Negative   WBCU 34* 115* 3 19* 5   UWBCLM Present*  --   --   --   --        Medication levels    Recent Labs   Lab Test 11/02/23  0734 10/28/23  0725 10/26/23  0606 03/31/20  1131 03/19/20  1032   VANCOMYCIN  --   --  18.6   < >  --    VCON  --   --   --   --  1.4   TACROL 6.8   < >  --    < > 10.3    < > = values in this interval not displayed.       CSF testing   No lab results found.    Invalid input(s): \"CADAM\", \"EVPCR\", \"ENTPCR\", \"ENTEROVIRUS\"    Body fluid stats    Recent Labs   Lab Test 11/01/23  0920 08/17/23  1144 08/17/23  1137 02/11/22  1308 02/11/22  1308 04/30/21  1937 04/29/21  1315 02/03/21  0941 01/25/21  1347 01/24/21  1629 02/20/20  0800 10/22/19  1430 01/17/19  1207 " 12/14/18  1054   FTYP  --   --   --   --   --  Pleural fluid  --   --  Pleural fluid Bronchoalveolar Lavage   < > Ascites   < > Bronchial washing   FCOL Colorless Pink* Colorless   < > Colorless Slightly Bloody  --   --  Yellow Pink   < > Yellow   < > [place]   FAPR Clear Hazy* Hazy*   < > Cloudy* Cloudy  --   --  Cloudy Slightly Cloudy   < > Clear   < > Turbid   FRBC  --   --   --   --   --   --   --   --   --   --   --   --   --  << Do Not Report >>   FWBC 49 267 236   < > 63 9460  --   --  660 355   < > 44   < > 9520   FNEU 5 7 2   < >  --   --   --   --  87 81   < > 4   < > 94   FLYM 6 4 4   < > 12  --   --   --   --  5   < > 49  --  1   FMONO 84 78 88   < >  --   --   --   --  13  --    < >  --    < > 4   FBAS  --  1  --   --   --   --   --   --   --   --    < >  --   --   --    FOTH  --   --   --   --  88 0  --   --   --  11  --  47   < >  --    FALB  --   --   --   --   --   --   --   --   --   --   --  0.8  --   --    FTP  --   --   --   --   --  3.3  --   --  3.4  --   --  1.7  --   --    GS  --   --   --   --   --   --  No organisms seen  Rare  WBC'S seen  predominantly PMN's     < > No organisms seen  Moderate  WBC'S seen  predominantly mononuclear cells   >25 PMNs/low power field  No organisms seen   < > No organisms seen  Few  WBC'S seen  predominantly mononuclear cells    Quantification of host cells and microbiological organisms was done on a cytocentrifuged   preparation.     < > >25 PMNs/low power field  Many  Gram negative diplococci  *  Moderate  Gram positive cocci  *    < > = values in this interval not displayed.       Microbiology:  Fungal testing  Recent Labs   Lab Test 10/27/23  0701 10/26/23  1318 10/17/23  1105 08/17/23  1144 10/07/19  1544 09/14/19  1625   FGTL 35  --   --   --    < > 122   FGTLI Negative  --   --   --    < > Positive*   PJRDFA  --   --  Not Detected Not Detected   < >  --    ASPGAI  --  0.07  --  0.05   < > 1.08   ASPAG  --   --   --  Negative   < >  --    ASPGAA  --   Negative  --   --    < > Positive*   CIABB  --   --   --   --   --  <1:2    < > = values in this interval not displayed.       Last Culture results   S Pneumoniae Antigen   Date Value Ref Range Status   01/24/2021   Final    Negative, no Streptococcus pneumoniae antigen detected by immunochromatographic membrane   assay. A negative Streptococcus pneumoniae antigen result does not rule out infection with   Streptococcus pneumoniae.       P. jirovecii By PCR   Date Value Ref Range Status   10/17/2023 Not Detected  Final     Comment:     NOT DETECTED - A negative result does not rule out the   presence of PCR inhibitors in the patient specimen or   assay specific nucleic acid in concentrations below the   level of detection by the assay.    This test was developed and its performance characteristics   determined by [facility]. It has not been cleared or   approved by the US Food and Drug Administration. This test   was performed in a CLIA certified laboratory and is   intended for clinical purposes.  Performed By: [facility]  [address] [place] [address]  : Rogelio Llanos MD, PhD  CLIA Number: [id number]   08/17/2023 Not Detected  Final     Comment:     NOT DETECTED - A negative result does not rule out the   presence of PCR inhibitors in the patient specimen or   assay specific nucleic acid in concentrations below the   level of detection by the assay.    This test was developed and its performance characteristics   determined by [facility]. It has not been cleared or   approved by the US Food and Drug Administration. This test   was performed in a CLIA certified laboratory and is   intended for clinical purposes.  Performed By: [facility]  [address] [place] [address]  : Rogelio Llanos MD, PhD  CLIA Number: [id number]   08/17/2023 Not Detected  Final     Comment:     NOT DETECTED - A negative result does not rule  out the   presence of PCR inhibitors in the patient specimen or   assay specific nucleic acid in concentrations below the   level of detection by the assay.    This test was developed and its performance characteristics   determined by [facility]. It has not been cleared or   approved by the US Food and Drug Administration. This test   was performed in a CLIA certified laboratory and is   intended for clinical purposes.  Performed By: [facility]  [address] [place] [id number]  : Rogelio Llanos MD, PhD  CLIA Number: [id number]   01/24/2021 Detected (A)  Final     Comment:     (Note)  DETECTED - P. jirovecii DNA detected in this specimen.  Results should be used to aid in the diagnosis of PCP  pneumonia and must be interpreted in the context of host  risk factors, clinical presentation, and radiographic  imaging.  TEST INFORMATION: Pneumocystis jirovecii Detection by PCR  Test developed and characteristics determined by [facility]. See Compliance Statement B: [employer]/  Performed by [facility],  [address] [place] [id number] [phone]  [email or web address], Carri Red MD, Lab. Director       Culture   Date Value Ref Range Status   11/01/2023 No growth after 2 days  Preliminary   11/01/2023 No growth after 2 days  Preliminary   11/01/2023 1+ Normal alo  Preliminary   11/01/2023 1+ Stenotrophomonas maltophilia (A)  Preliminary   10/26/2023   Final    >10 Squamous epithelial cells/low power field indicates oral contamination. Please recollect.   10/25/2023 No Growth  Final   10/25/2023 No Growth  Final   10/17/2023 1+ Actinomyces species (A)  Final     Comment:     Susceptibilities not routinely done, refer to antibiogram to view typical susceptibility profiles  This organism is part of normal alo, but on occasion may be a true pathogen. Clinical correlation must be applied to interpreting this result.   10/17/2023 1+ Normal alo  Final  "  10/17/2023 No growth after 16 days  Preliminary   10/17/2023 No growth after 16 days  Preliminary   08/17/2023 No Growth  Final   08/17/2023 No Growth  Final   08/17/2023 1+ Normal alo  Final   08/17/2023 1+ Stenotrophomonas maltophilia (A)  Final     Comment:     Susceptibilities done on previous cultures   08/17/2023 No Growth  Final   08/17/2023 No Growth  Final   08/17/2023 1+ Normal alo  Final   08/17/2023 1+ Stenotrophomonas maltophilia (A)  Final   02/11/2022 No Actinomyces isolated  Final   02/11/2022 No Growth  Final   02/11/2022 No Growth  Final     Culture Micro   Date Value Ref Range Status   05/01/2021   Final    Quantity not sufficient  Called to Maxim Norman at 1236 5/1/21.    mlb     05/01/2021 Culture negative after 30 days  Final   04/29/2021 No anaerobes isolated  Final   04/29/2021 No growth  Final   02/19/2021 Culture negative for acid fast bacilli  Final   02/19/2021   Final    Assayed at [facility] [address] [place] [id number] [phone]   02/19/2021 Culture negative after 4 weeks  Final   02/19/2021 No growth after 4 weeks  Final   02/19/2021 Moderate growth  Staphylococcus epidermidis   (A)  Final   02/09/2021 No growth  Final         Last checks of Clostridioides difficile testing  Recent Labs   Lab Test 10/31/23  0732 02/13/21  0530 12/04/18  1500 08/02/18  2253   CDBPCT Negative Negative Negative Positive*       Syphilis Testing    No results found for: \"TREPT\", \"TREPAB\", \"RPR\", \"TPPAT\", \"CVD\"    Quantiferon testing   Recent Labs   Lab Test 11/03/23  0556 11/02/23  0734 09/20/21  1903 05/01/21  1102 02/22/21  0527 02/19/21  0853 02/11/21  0645 01/25/21  1347 01/24/21  1629 10/28/19  0553 10/25/19  1628 10/06/19  1444 09/14/19  1625 02/21/18  1439 02/19/18  0759   TBRES  --   --   --   --   --   --   --   --   --   --  Negative  --  Negative  --   --    TBRSLT  --   --   --   --   --   --   --   --   --   --   --   --   --   --  Negative   TBAGN  --   --   --   --  " " --   --   --   --   --   --   --   --   --   --  0.00   LYMPH 4 7   < >  --    < >  --    < >  --   --    < >  --    < >  --    < > 8.4   AFBSMS  --   --   --  Quantity not sufficient  Called to Dr Maxim Norman at 1236 5/1/21.    mlb    --  Negative for acid fast bacteria  Less than 5ml of specimen received.  A minimum of 5 mL of sputum or fluid is recommended for recovery of acid fast bacilli   (AFB).  Volumes less than 5 mL are suboptimal and may compromise recovery of AFB from   culture.    Assayed at [facility] [address] [place] [address] [phone]  --  Negative for acid fast bacteria  Less than 5ml of specimen received.  A minimum of 5 mL of sputum or fluid is recommended for recovery of acid fast bacilli   (AFB).  Volumes less than 5 mL are suboptimal and may compromise recovery of AFB from   culture.    Assayed at [facility] [address] [place] [address] [phone] Negative for acid fast bacteria  Less than 5ml of specimen received.  A minimum of 5 mL of sputum or fluid is recommended for recovery of acid fast bacilli   (AFB).  Volumes less than 5 mL are suboptimal and may compromise recovery of AFB from   culture.    Assayed at [facility] [address] [place] [address] [phone]   < >  --    < >  --    < >  --     < > = values in this interval not displayed.       Infection Studies to assess Diarrhea  No lab results found.    Invalid input(s): \"BIDYD\"    Virology:  Coronavirus-19 testing    Recent Labs   Lab Test 07/11/23  1132 10/12/22  1528 09/26/22  0956 05/23/22  1013 04/01/22  1257 02/07/22  1301 01/31/22  1309 01/24/22  1324 09/20/21  1859 09/15/21  0623 06/07/21  1310 05/02/21  0715 04/26/21  1151 04/08/21  0723   PSE79AS  --   --   --   --   --   --   --   --   --   --   --  Negative  --   --    [id number]  --   --   --   --   --   --   --   --   --   --   --  Not Applicable  --   --    [id number] Negative  --   --   --   --   --   --   --  " Negative Negative  --   --   --  Test received-See reflex to IDDL test SARS CoV2 (COVID-19) Virus RT-PCR  NEGATIVE   [id number]  --   --   --   --   --   --   --   --   --   --   --   --   --  Nasopharyngeal   [id number]  --   --   --   --   --   --   --   --   --   --   --   --   --  Nasopharyngeal   COVIDPCREXT  --  Negative Negative Undetected   < > Undetected Undetected   < >  --   --    < >  --   --   --    SOUREXT  --   --   --  Nasopharynx  --  Nasopharynx Nasopharynx   < >  --   --    < >  --    < >  --    [id number]  --   --   --  Undetected  --  Undetected Undetected   < >  --   --    < >  --    < >  --     < > = values in this interval not displayed.       Respiratory virus (non-coronavirus-19) testing    Recent Labs   Lab Test 11/01/23  0920 10/27/23  0449 08/17/23  1144 01/24/21  1822 01/24/21  1629 12/23/20  1102 02/27/18  1016 02/22/18  0900   RVSPEC  --   --   --   --  Bronchial Bronchial   < >  --    AFLU  --   --   --   --   --   --   --  Duplicate request*   IFLUA Not Detected Not Detected Not Detected   < > Negative Negative   < >  --    INFZA  --   --   --   --   --   --   --  Negative   FLUAH1 Not Detected Not Detected Not Detected   < > Negative Negative   < >  --    [id number] Not Detected Not Detected Not Detected   < > Negative Negative   < >  --    FLUAH3 Not Detected Not Detected Not Detected   < > Negative Negative   < >  --    BFLU  --   --   --   --   --   --   --  Duplicate request*   IFLUB Not Detected Not Detected Not Detected   < > Negative Negative   < >  --    INFZB  --   --   --   --   --   --   --  Negative   PIV1 Not Detected Not Detected Not Detected   < > Negative Negative   < >  --    PIV2 Not Detected Not Detected Not Detected   < > Negative Negative   < >  --    PIV3 Not Detected Not Detected Not Detected   < > Negative Negative   < >  --    PIV4 Not Detected Not Detected Not Detected   < >  --   --    < >  --    IRSV  --   --   --   --   --   --   --  Negative   HRVS   --   --   --   --  Negative Negative   < >  --    RSVA Not Detected Not Detected Not Detected   < > Negative Negative   < >  --    RSVB Not Detected Not Detected Not Detected   < > Negative Negative   < >  --    HMPV Not Detected Not Detected Not Detected   < > Negative Negative   < >  --    ADVBE  --   --   --   --  Negative Negative   < >  --    ADVC  --   --   --   --  Negative Negative   < >  --    ADENOV Not Detected Not Detected Not Detected   < >  --   --    < >  --    CORONA Not Detected Not Detected Not Detected   < >  --   --    < >  --     < > = values in this interval not displayed.       CMV viral loads    Recent Labs   Lab Test 10/31/23  0606 10/24/23  1033 10/17/23  1011 10/11/23  0806 10/04/23  0810 09/07/23  0710 08/17/23  1144 08/17/23  1137 08/08/23  0828 08/08/23  0828 07/11/23  0952 05/26/23  0828 04/06/23  0725 03/08/23  0848 02/09/23  1034 11/18/22  0928 09/27/22  1012 06/27/22  1013 05/26/22  0750 03/03/22  1054 02/11/22  1308 02/10/22  0919 11/10/21  1106 10/12/21  1251 03/15/21  0904 02/25/21  0542   CMVQNT <35*  --   --   --   --   --   --   --   --   --  Not Detected  --  Not Detected  --  <137*  --  Not Detected  --  <137*  --  Not Detected Not Detected  --  Not Detected   < > CMV DNA Not Detected   CMVRESINST  --  103*  80* 75*  --   --  521*  --   --   --  46*  --   --   --   --   --   --   --   --   --   --   --   --   --   --   --   --    CSPEC  --   --   --   --   --   --   --   --   --   --   --   --   --   --   --   --   --   --   --   --   --   --   --   --   --  EDTA PLASMA   CMVLOG <1.5 2.0  1.9 1.9  --   --  2.7  --   --    < > 1.7  --   --   --   --  <2.1  --   --   --  <2.1   < >  --   --   --   --   --  Not Calculated   315[address]  --   --   --  2,680*  2,680*   < >  --   --   --   --   --   --    < >  --    < >  --    < >  --    < >  --    < >  --   --    < >  --    < >  --    CMVQAL  --   --   --   --   --   --  Not Detected Not Detected  --   --   --   --   --   --   --    "--   --   --   --   --   --   --   --   --   --   --     < > = values in this interval not displayed.       CMV resistance testing  Recent Labs   Lab Test 08/03/18  0624   CMVCID Sensitive   CMVFOS Sensitive   CMVGAN Sensitive       No results found for: \"H6RES\"    EBV DNA Copies/mL   Date Value Ref Range Status   10/28/2023 10,175 (H) <=0 copies/mL Final   08/08/2023 42,461 (H) <=0 copies/mL Final   07/11/2023 49,591 (H) <=0 copies/mL Final   04/06/2023 43,506 (H) <=0 copies/mL Final   03/01/2023 83,207 (H) <=0 copies/mL Final   02/09/2023 114,508 (H) <=0 copies/mL Final   09/27/2022 21,749 (H) <=0 copies/mL Final   05/26/2022 77,030 (H) <=0 copies/mL Final   02/10/2022 135,117 (H) <=0 copies/mL Final   10/12/2021 969,411 (H) <=0 copies/mL Final   09/15/2021 45,122 (H) <=0 copies/mL Final   05/01/2021 38,186 (A) EBVNEG^EBV DNA Not Detected [Copies]/mL Final   02/22/2021 75,709 (A) EBVNEG^EBV DNA Not Detected [Copies]/mL Final   01/25/2021 5,619 (A) EBVNEG^EBV DNA Not Detected [Copies]/mL Final   12/09/2020 10,686 (A) EBVNEG^EBV DNA Not Detected [Copies]/mL Final   09/09/2020 2,935 (A) EBVNEG^EBV DNA Not Detected [Copies]/mL Final   03/09/2020 8,918 (A) EBVNEG^EBV DNA Not Detected [Copies]/mL Final     EBV DNA Quant (External)   Date Value Ref Range Status   10/11/2023 <35 (A) Undetected IU/mL Final   05/26/2023 <35 (A) Undetected IU/mL Final   03/08/2023 <35 (A) undetected IU/ml Final   03/08/2023 <35 (A) Undetected IU/mL Final   01/13/2023 Undetected Undetected IU/mL Final     EBV Interp DNA Quant (External)   Date Value Ref Range Status   03/08/2023   Final    EBV DNA is detected, but level present is <35 IU/mL (<1.54 log IU/mL). This assay cannot accurately quantify EBV DNA below this level.       BK Virus Result   Date Value Ref Range Status   08/07/2019 BK Virus DNA Not Detected BKNEG^BK Virus DNA Not Detected copies/mL Final       Parvovirus Testing  No lab results found.    Invalid input(s): " "\"PRVRES\"    Adenovirus Testing  No lab results found.    Invalid input(s): \"ADENAB\", \"ADENOVIRUS\", \"ADQT\"    Hepatitis B Testing     Recent Labs   Lab Test 10/26/23  1233 09/21/21  1055 10/25/19  1800 06/05/19  1156 06/05/18  1029 03/01/18  0356   AUSAB 0.67 0.50   < >  --   --  0.41   HBCAB  --   --   --  Nonreactive  --  Nonreactive   HEPBANG Nonreactive Nonreactive   < > Nonreactive   < > Nonreactive    < > = values in this interval not displayed.        Hepatitis C Antibody   Date Value Ref Range Status   06/05/2019 Nonreactive NR^Nonreactive Final     Comment:     Assay performance characteristics have not been established for newborns,   infants, and children     02/19/2018 Nonreactive NR^Nonreactive Final     Comment:     Assay performance characteristics have not been established for newborns,   infants, and children         CMV Antibody IgG   Date Value Ref Range Status   03/01/2018 Canceled, Test credited 0.0 - 0.8 AI Final     Comment:     Test reordered as correct code  SEE CMV QUANTITATIVE PCR     03/01/2018 >8.0 (H) 0.0 - 0.8 AI Final     Comment:     Positive  Antibody index (AI) values reflect qualitative changes in antibody   concentration that cannot be directly associated with clinical condition or   disease state.     02/19/2018 >8.0 (H) 0.0 - 0.8 AI Final     Comment:     Positive  Antibody index (AI) values reflect qualitative changes in antibody   concentration that cannot be directly associated with clinical condition or   disease state.       Varicella Zoster Virus Antibody IgG   Date Value Ref Range Status   02/19/2018 3.8 (H) 0.0 - 0.8 AI Final     Comment:     Positive, suggests prev. exposure and probable immunity  Antibody index (AI) values reflect qualitative changes in antibody   concentration that cannot be directly associated with clinical condition or   disease state.       EBV Capsid Antibody IgG   Date Value Ref Range Status   03/01/2018 >8.0 (H) 0.0 - 0.8 AI Final     Comment: "     Positive, suggests recent or past exposure  Antibody index (AI) values reflect qualitative changes in antibody   concentration that cannot be directly associated with clinical condition or   disease state.     02/19/2018 >8.0 (H) 0.0 - 0.8 AI Final     Comment:     Positive, suggests recent or past exposure  Antibody index (AI) values reflect qualitative changes in antibody   concentration that cannot be directly associated with clinical condition or   disease state.       Toxoplasma Antibody IgG   Date Value Ref Range Status   02/19/2018 <3.0 0.0 - 7.1 IU/mL Final     Comment:     Negative- Absence of detectable Toxoplasma gondii IgG antibodies. A negative   result does not rule out acute infection.  The magnitude of the measured result is not indicative of the amount of   antibody present. The concentrations of anti-Toxoplasma gondii IgG in a given   specimen determined with assays from different manufacturers can vary due to   differences in assay methods and reagent specificity.       Herpes Simplex Virus Type 1 IgG   Date Value Ref Range Status   03/01/2018 >8.0 (H) 0.0 - 0.8 AI Final     Comment:     Positive.  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  Antibody index (AI) values reflect qualitative changes in antibody   concentration that cannot be directly associated with clinical condition or   disease state.     02/19/2018 >8.0 (H) 0.0 - 0.8 AI Final     Comment:     Positive.  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  Antibody index (AI) values reflect qualitative changes in antibody   concentration that cannot be directly associated with clinical condition or   disease state.       Herpes Simplex Virus Type 2 IgG   Date Value Ref Range Status   03/01/2018 <0.2 0.0 - 0.8 AI Final     Comment:     No HSV-2 IgG antibodies detected.  Antibody index (AI) values reflect qualitative changes in antibody   concentration that cannot be directly associated with clinical condition or   disease state.     02/19/2018 <0.2 0.0 - 0.8  AI Final     Comment:     No HSV-2 IgG antibodies detected.  Antibody index (AI) values reflect qualitative changes in antibody   concentration that cannot be directly associated with clinical condition or   disease state.         Imaging:  Recent Results (from the past 48 hour(s))   CT Chest w Contrast    Narrative    EXAM: CT CHEST W CONTRAST 11/1/2023 9:14 PM    HISTORY: 62 years Female Evaluate for possible clot/mass/lymphatic  obstruction/inflammatory cause of LUE swelling, please include LUE    COMPARISON: CT chest 10/27/2023, MR chest 10/31/2023.    TECHNIQUE: Helical CT imaging of the chest was obtained with contrast.  Multiplanar post-processed and MIP reformats were obtained reviewed.  The left upper extremity was additionally included within the  field-of-view.    FINDINGS:  LINES/TUBES: None.    MEDIASTINUM: Prominent left inferior cervical lymph nodes. Mildly  enlarged bilateral pulmonary window lymph node measuring 1.0 cm,  subcarinal node measuring 1.1 cm an additional subcentimeter  paratracheal lymph nodes. Normal size/configuration of the great  vessels. Normal heart size. No pericardial effusion. Atherosclerotic  calcifications of the aortic arch. Patent vascular anastomoses.    LUNG: Postsurgical change of bilateral lung transplantation. The  bronchial anastomoses are patent. There is unchanged mild narrowing of  the right mainstem bronchus just distal to the anastomosis. Similar  appearance of patchy peribronchial vascular opacities predominantly in  the right lower lobe, and to a lesser degree the left upper lobe, left  lower lobe, and right middle lobe. There is decreased septal  thickening in the right upper lobe.     PLEURA: No pneumothorax. Small loculated left pleural effusion. Trace  right pleural effusion. Unchanged peripherally calcified 2.6 x 1.7 cm  soft tissue density within the inferior medial left pleural space.    LOWER NECK: Thyroid unremarkable.    UPPER ABDOMEN: Esophagus  appears unremarkable. Cholelithiasis with  mildly prominent gallbladder wall and small volume pericholecystic  fluid. Mildly dilated common bile duct measuring up to 1.1 cm. Mild  dilation of the main pancreatic duct without visualized pancreatic  mass.    BONES: No acute osseous abnormality. No suspicious bony lesions.    SOFT TISSUES: Overlying subcutaneous thickening and soft tissue fat  stranding involving the left breast.     Lymph nodes: Ongoing prominent and enlarged axillary lymph nodes. For  example there is a 1.5 x 1.1 cm left axillary lymph node (series 4,  image 80), previously 1.6 x 1.1 cm, as well as a right axillary lymph  node measuring 1.6 x 1.1 cm (series 4, image 64), previously 1.4 x 1.1  cm. Numerous prominent supraclavicular and mediastinal lymph nodes,  unchanged compared to prior.    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  Vasculature appears patent. 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  Soft tissue swelling throughout the left upper extremity.      Impression    IMPRESSION:   1.  The left upper extremity vasculature, including the arteriovenous  fistula graft, appear patent with known pseudoaneurysms in the upper  and mid arm. Left upper extremity subcutaneous edema. No additional  findings identified in the left upper extremity to explain patient's  symptoms.  2.  Ongoing left breast subcutaneous edema and skin thickening with  adjacent lymphadenopathy, likely infectious/inflammatory.  3.  Unchanged axillary, mediastinal, and supraclavicular  lymphadenopathy.  4.  Cholelithiasis with pericholecystic fluid, mild wall thickening,  and mild dilation of the common bile duct and main pancreatic duct.  Recommend correlation with clinical symptoms. Consider further  evaluation with ultrasound versus MRCP if concern  for  cholecystitis/biliary obstruction.  5.  Ongoing but slightly improved pulmonary opacities compared to  10/27/2023. Differential includes pulmonary edema and/or infection.  6.  Stable loculated left pleural effusion and trace right pleural  effusion.    I have personally reviewed the examination and initial interpretation  and I agree with the findings.    SUNITA ADKINS MD         SYSTEM ID:  [id number]   IR Dialysis Fistulogram Left    Narrative    PROCEDURE: Dialysis circuit evaluation and interventions 11/2/2023    Procedural Personnel  Attending physician(s): LLUVIA Rodriguez MD  Fellow physician(s): BRODERICK Urbano MD  Resident physician(s): None  Advanced practice provider(s): None    Pre-procedure diagnosis: Functioning fistulagram, arm swelling  Post-procedure diagnosis: Same  Indication: Arm swelling  Additional clinical history: None    Complications: No immediate complications.      Impression    IMPRESSION:  Fistulagram demonstrates significant recurrent narrowing of the left  innominate vein/medial subclavian vein with significant chest  collaterals. Segment was venoplastied to 10 mm followed by application  of 12 mm drug coated balloon with good angiographic results.     Plan:  The graft can be used for dialysis immediately.  _______________________________________________________________    PROCEDURE SUMMARY:  - Access of dialysis circuit with ultrasound guidance  - Procedure: Angiogram and central segment angioplasty  - Additional procedure(s): None    PROCEDURE DETAILS:    Pre-procedure  Consent: Informed consent for the procedure including risks, benefits  and alternatives was obtained and time-out was performed prior to the  procedure.  Preparation: The site was prepared and draped using maximal sterile  barrier technique including cutaneous antisepsis.    Anesthesia/sedation  Level of anesthesia/sedation: Moderate sedation (conscious sedation)  Anesthesia/sedation administered by: Independent trained  observer  under attending supervision with continuous monitoring of the  patient?s level of consciousness and physiologic status  Total intra-service sedation time (minutes): 38  4mg zofran IV pre procedure, 2 mg versed IV, 100 mcg fentanyl IV?    Initial dialysis circuit evaluation  Dialysis circuit side: Left  Dialysis circuit type: Upper arm loop graft  Initial circuit palpation: Pulse but no thrill    Access (towards outflow)  Local anesthesia was administered. The dialysis circuit was  sonographically evaluated and judged to be patent. Real time  ultrasound was used to visualize needle entry into the dialysis  circuit and a permanent image was stored..   Access technique: Micropuncture set with 21 gauge needle  Sheath size (Egyptian): 7    Initial angiography  Initial angiography of the dialysis circuit, outflow veins, and  central veins was performed.  Findings: Fistulagram demonstrates significant recurrent narrowing of  the left innominate vein/medial subclavian vein with significant chest  collaterals.     Angioplasty  Location: Left innominate vein/medial SCV  Angioplasty balloon: 10 mm [place] followed by 12 mm Lutonix  Angioplasty balloon type: Conventional followed by Lutonix    Final angiography  Findings: Significant caliber improvement of segment and resolution of  chest wall collaterals  Final dialysis circuit palpation: Palpable thrill    Reflux angiogram showed axillary artery anastomosis with no evidence  of stenosis.    Closure (outflow)  The sheath was removed and hemostasis was achieved with an external  device. A sterile dressing was applied.    Radiation Dose  Fluoroscopy time (minutes): 4.1    Reference air kerma (mGy): 6.0   Kerma area product (uGy-m2): 694.87      Additional Details  Additional description of procedure: None  Registry event: V/3/g  Device used: None  Equipment details: None  Unique Device Identifiers: Not available  Specimens removed: None  Estimated blood loss (mL): Less  than 10  Standardized report: [email or web address]nandIntervention_v3.1    Attestation  Signer name: CLAUDINE ESTRADA MD  I attest that I was present for the key elements of the procedure and  immediately available. I reviewed the stored images and agree with the  report as written.    I have personally reviewed the examination and initial interpretation  and I agree with the findings.    CLAUDINE ESTRADA MD         SYSTEM ID:  [id number]   XR Chest 1 View    Narrative    XR CHEST 1 VIEW  11/2/2023 9:40 AM     HISTORY:  s/p BSLT in 2018; bronchoscopy with right middle lobe  bronchus and balloon dilation 10/17/2023.    COMPARISON:  CT chest 11/1/2023    TECHNIQUE: XR CHEST 1 VIEW    FINDINGS:   Postsurgical changes related to bilateral lung transplant in 2018.  Low lung volumes with mixed hazy airspace and reticular opacities  diffusely in the left greater than right bilateral lungs. Moderate  right and small/moderate left pleural effusions with opacification of  the bilateral hemidiaphragms and bibasilar atelectasis.  No pneumothorax. Cardiac silhouette is normal.      Impression    IMPRESSION:  Moderate right and small/moderate left pleural effusions with  bibasilar atelectasis. Scattered hazy airspace opacities which may  reflect pulmonary edema versus superimposed infectious process, in the  right clinical context    I have personally reviewed the examination and initial interpretation  and I agree with the findings.    ADAM GONZALEZ MD         SYSTEM ID:  [id number]   US Abdomen Limited    Narrative    EXAMINATION: Limited Abdominal Ultrasound, 11/2/2023 9:52 AM     COMPARISON: CT abdomen and pelvis 10/28/2023    HISTORY: Cholelithiasis on CT, elevated alk phos, RUQ US    FINDINGS:     Fluid: No evidence of ascites or pleural effusions.    Liver: The liver demonstrates increased echogenicity, measuring 19.9  cm in craniocaudal dimension. There is no focal mass.     Gallbladder:  Cholelithiasis/tumefactive sludge. No gallbladder wall  thickening or pericholecystic fluid. Negative sonographic Swanson sign.    Bile Ducts: Both the intra- and extrahepatic biliary system are of  normal caliber.  The common bile duct measures 7 mm in diameter.    Pancreas: Visualized portions of the head and body of the pancreas are  unremarkable. Pancreas is partially obscured    Kidney: The right kidney measures 7.8 cm long. Simple cyst of the  right kidney measuring 1.4 x 1.7 x 1.1 cm. Echogenic appearance of the  renal parenchyma. There is no hydronephrosis or hydroureter, or no  shadowing renal calculi.      Impression    IMPRESSION:       1. Increased hepatic parenchymal echogenicity which may be seen with  parenchymal liver disease including hepatic steatosis.  2. Mild hepatomegaly. No focal liver lesion demonstrated.  3. Cholelithiasis/tumefactive sludge with borderline gallbladder wall  thickening, no additional signs for cholecystitis  4. Common bile duct size is at upper limits of normal measuring 7 mm.  No obstructing lesion seen in the visualized common bile duct  5. Small right kidney with increased parenchymal echogenicity which  may be seen with chronic renal parenchymal disease    I have personally reviewed the examination and initial interpretation  and I agree with the findings.    EDIE VILLA MD         SYSTEM ID:  [id number]     CT CHEST 10/24/2023                                                                   IMPRESSION: Worsening consolidation bilaterally concerning for  worsening infection. New prominent left upper lobe anterior  pleural-based soft tissue thickening. Recommend follow to clearing.  Neoplasm somewhat less likely given the short interval but cannot be  entirely excluded. Mild air trapping.  Ectasia of the mid ascending thoracic aorta.  Left breast skin thickening and edema in the breast tissues again  present. Rim calcified almost certainly benign density at the  left  lung base. Trace pleural effusions bilaterally.  Unchanged osteopenic T5 compression deformity.  Atherosclerosis.    MR Chest 10/25/2023  IMPRESSION:  1. Mildly prominent left supraclavicular lymph nodes. Grossly similar  to recent CT scan of the chest 4 days ago.  2. Diffuse muscle atrophy.  3. Abnormal density anterior posterior left lower rib in the left  pleural space which was peripherally calcified on the recent CT. This  may be old inflammatory/infectious process.  4. Loculated left pleural effusion with small free-flowing right  pleural effusion. 9 skin thickening of left breast.  5. Skin thickening of left breast. Please correlate with dedicated  breast imaging as appropriate. Edema in the left breast tissues as  well as left chest wall. Possible inflammatory/infectious process.  6. Bilateral lung transplant history.  7. Nondistended gallbladder with small amount of pericholecystic fluid  in patient with reported previous history cholelithiasis.

## 2023-11-03 NOTE — PLAN OF CARE
Goal Outcome Evaluation:      Plan of Care Reviewed With: patient    Overall Patient Progress: no changeOverall Patient Progress: no change    Outcome Evaluation: 1L oxygen at baseline.  Increases amount with activity.  Showered today.  Dialysis this morning. Patient plainning for discharge tomorrow.  Family coming from 3 hours away to pick her up.

## 2023-11-03 NOTE — TELEPHONE ENCOUNTER
Kecia Blue is getting discharged today or tomorrow.   She has appt coming up with Ame Chow in 3 weeks  IP is working on a custom stent ( for right stenosis that has required bronchial dilation x 2)-could you coordinate with the patient regarding timing for bronchoscopy once this is arranged.    
Gen: Alert, Well appearing. NAD    Head: NC, AT, PERRL, normal lids/conjunctiva   ENT: Bilateral TM WNL, patent oropharynx without erythema/exudate, uvula midline  Neck: supple, no tenderness/meningismus  Pulm: Bilateral clear BS, normal resp effort  CV: RRR, no M/R/G, +dist pulses   Abd: soft, NT/ND, +BS, no guarding/rebound tenderness  Mskel: extremities x4 with normal ROM. no ctl spine ttp. no edema/erythema/cyanosis   Skin: no rash, no bruising  Neuro: AAOx3, no sensory/motor deficits, CN 2-12 intact

## 2023-11-03 NOTE — PROGRESS NOTES
HEMODIALYSIS TREATMENT NOTE    Date: 11/3/2023  Time: 11:56 AM    Data:  Pre Wt: 61.7 kg  Desired Wt:  60 kg   Post Wt: 60 kg  Weight change: 1.7 kg  Ultrafiltration - Post Run Net Total Removed (mL): 1700 mL  Vascular Access Status: patent  Dialyzer Rinse: Streaked, Light  Total Blood Volume Processed: 76.01 L Liters  Total Dialysis (Treatment) Time: 3.5 Hours    Lab:   No    Interventions:  LAVF cannulated with 15 gauge needles  Needles removed  Pressure dressing applied  Epoetin    Assessment:  Pt ran for 3.5 hrs on a K3/ Ca 2.25 bath with a -400/  and 1.7 L pulled with no complications. LAVF positive for T/B. Pt rinsed back and hemodialysis achieved in about 10 mins. Pt alert and oriented x4 with no complaints. Pt ate meal tray during tx. Report given to PCN.      Plan:    Per renal team

## 2023-11-04 NOTE — PROGRESS NOTES
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  The patient was admitted on 10/25/2023 with malaise, cough, and dyspnea along with 2 weeks of new hypoxia, CT chest concerning for uncontrolled infection, however primary problem was bronchial stenosis.  IP bronch with dilation 11/1, will require custom stent.  Additionaly with LUE swelling and left breast changes, now resolving  s/p venous fistulogram (11/2).  Continues to require 1-3 L O2 via NC.  Discharge home 11/4.      CXR today personally reviewed; improved aeration throughout with persistent bilateral perihilar and bibasilar streaky/hazy opacities.     Immunosuppression:   - Tacrolimus 0.5 mg BID. Goal level 8-10.  Level 11/4 therapeutic, no dose adjustment. Repeat level in one week as OP  - AZA on HOLD (11/1 for pancytopenia, Plan to resume at decreased dose of 50 mg daily on 11/8 after ABX regimen is completed; PTA dose was 75 mg daily)  - Prednisone chronic dose on hold with steroid burst/taper, chronic dose to resume 11/6  - Repeat ImmuKnow ~11/21 at OP appt     Discharge Recommendations:  - Nebs:  Xopenex TID and Mucomyst TID.  Pulmicort daily PRN.  - Outstanding labs:  Posaconazole level (11/3, 12:22) pending (of note, disregard the 11/3, 05:56 level).  - Routine labs:  CMV weekly to continue for now until two consecutive CMV levels in a row one week apart, then CMV every other week (negative on 11/6).  - Future labs:  In ~1 week, obtain CMP, CBC w/diff, tacrolimus, and CMV.  At next OP appt on 11/21, obtain CMP, CBC w/diff, tacrolimus, CMV, EBV, and ImmuKnow (additionally,  repeat posa level if dose is changed).  - PPX:  Bactrim MWF for PJP ppx (lifelong).  Posaconazole 300 mg daily for Aspergillus ppx (lifelong),  increase dose to 400 mg daily if repeat  posa level (11/3, 12:22) is subtherapeutic.  Augmentin for ~2 weeks following all future IP bronchs that require dilation (or alternatively, 1 week out of each month) per txp ID.  - ABX:  Augmentin through 11/7.  Continue PTA Valcyte 450 mg MWF after HD.    - CLAD:  Singulair, Advair, and azithromycin (azithro held briefly for prolonged QTc, now resolved).  - Notable cultures:  Stenotrophomonas on BAL (11/1), per txp ID no need to treat.  - Imaging:  Repeat chest CT at OP clinic appt 11/21.  - Procedures:  IP bronchoscopy with custom silicone stent, likely first week of December.  - PFTs:  Deferred until OP clinic appt 11/21.  - Other:  Continue MWF  HD.  PTA home O2 to continue.  Follow-up of LUE / left breast per PCP.    - Outpatient follow-up:  Scheduled 11/21 with pulmonary transplant.  Additionally transplant ID on 12/1.      Patient discussed with Dr. Yeyo Ibarra, APRN, CNP   Inpatient Nurse Practitioner  Pulmonary CF/Transplant

## 2023-11-04 NOTE — PLAN OF CARE
Goal Outcome Evaluation:      Plan of Care Reviewed With: patient    Overall Patient Progress: no changeOverall Patient Progress: no change     A&Ox4, VSS on 1 L NC. SBA. PIV SL, L fistula WNL. Slept well overnight. No acute changes. POC continued.

## 2023-11-06 NOTE — DISCHARGE SUMMARY
[facility]  Discharge Summary - Medicine & Pediatrics       Date of Admission:  10/25/2023  Date of Discharge:  11/4/2023  2:58 PM  Discharging Provider: Dr. 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  #Chronic lung allograft dysfunction  #Hx Stenotrophomonas pneumonia  #Erythroderma, improving  #Diffuse erythema, resolved  #Diffuse pruritus, resolved  #Hx dermatomyositis  #LUE swelling, improved  #LLE swelling, improved  #L breast edema, chronic >1 yr, improved  #Lymphadenopathy (?reactive)- retroperitoneal, bilateral axillary, L retropectoral regions  #Hx EBV viremia  #Hx PJP  #CMV viremia  #Leukopenia, resolved  #Cholelithiasis/tumefactive sludge w/o obstruction of CBD  #Acute abdominal pain, LLQ, resolving  #Watery diarrhea, resolved  #h/o C diff colitis  #Recurrent transient hypotension w/o altered mentation, improved  #Prolongation of Qtc, resolved  #Hx Arrythmia (Afib)  #ESRD on HD (M, W, F)  #Anemia of CKD, macrocytic  #Hypophosphatemia  #Hx Hyperphosphatemia  #Hx Left Sided Aspergillus Empyema:      Clinically Significant Risk Factors          Follow-ups Needed After Discharge   Follow-up Appointments     Follow Up and recommended labs and tests      Follow up with primary care provider, Ame Chow, already   scheduled          Per Transplant Pulmonology:    Nebs: Xopenex TID and Mucomyst TID. Pulmicort daily PRN.   - Outstanding labs: Tacrolimus level (11/4) pending. Posaconazole level (11/3) pending (of note, the 11/3, 12:22 level will be accurate; disregard the 11/3, 05:56  level).   - Routine labs: CMV weekly to continue for now until two consecutive CMV levels in a row one week apart, then CMV every other week (negative on 11/6).   - Future labs: In ~1 week, obtain CMP, CBC w/diff, tacrolimus, and CMV. At next OP appt on 11/21, obtain CMP, CBC w/diff, tacrolimus, CMV, EBV, and ImmuKnow (additionally, repeat posa level if dose is changed).   - IST: Tacrolimus 0.5 mg BID (goal 8-10). AZA currently on HOLD (since 11/1 for pancytopenia), to be resumed at decreased dose of 50 mg daily on 11/8. Prednisone home/chronic dose of 5 mg daily to be resumed on 11/6 (currently finishing pred taper 10/28-11/5 for rash).   - PPX: Bactrim MWF for PJP ppx (lifelong). Posaconazole 300 mg daily for Aspergillus ppx (lifelong), increase dose to 400 mg daily if repeat posa level (11/3, 12:22) is subtherapeutic. Augmentin for ~2 weeks following all future IP bronchs that require dilation (or alternatively, 1 week out of each month) per txp ID.   - ABX: Augmentin through 11/7. Continue PTA Valcyte 450 mg MWF after HD.   - CLAD: Singulair, Advair, and azithromycin (azithro held briefly for prolonged QTc, now resolved).   - Notable cultures: Stenotrophomonas on BAL (11/1), per txp ID no need to treat.   - Imaging: Repeat chest CT at OP clinic appt 11/21.   - Procedures: IP bronchoscopy with custom silicone stent, likely first week of December.   - PFTs: Deferred until OP clinic appt 11/21.     - Other: Continue MWF HD. PTA home O2 to continue. Follow-up of LUE / left breast per PCP. - Outpatient follow-up: Scheduled 11/21 with pulmonary transplant. Additionally transplant ID on 12/1.     Unresulted Labs Ordered in the Past 30 Days of this Admission       Date and Time Order Name Status Description    11/3/2023 11:44 AM Posaconazole Level In process     11/3/2023  1:00 AM Posaconazole Level In process     11/1/2023  9:29 AM Fungal or Yeast Culture Routine Preliminary     11/1/2023  9:29 AM Nocardia species  culture Preliminary     11/1/2023  9:29 AM Acid-Fast Bacilli Culture and Stain In process     10/29/2023  1:01 AM MyoMarker Panel 3 Plus In process     10/17/2023 11:15 AM Fungal or Yeast Culture Routine Preliminary     10/17/2023 11:15 AM Nocardia species culture Preliminary     10/17/2023 11:15 AM Acid-Fast Bacilli Culture and Stain In process         These results will be followed up by Transplant Pulm and PCP    Discharge Disposition   Discharged to home  Condition at discharge: Stable    Hospital Course   Kecia Blue is a 62 year old female admitted on 10/25/2023. 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 On 11/4 pt was discussed with Transplant Pulm as well as consult teams and it was determined that pt was appropriate to discharge with good follow up.      Discharge recommendations from Transplant Pulm as below:  - Outstanding labs: Tacrolimus level (11/4) pending. Posaconazole level (11/3) pending (of note, the 11/3, 12:22 level will be accurate; disregard the 11/3, 05:56 level).   - Routine labs: CMV weekly to continue for now until two consecutive CMV levels in a row one week apart, then CMV every other week (negative on 11/6).   - Future labs: In ~1 week, obtain CMP,  CBC w/diff, tacrolimus, and CMV. At next OP appt on 11/21, obtain CMP, CBC w/diff, tacrolimus, CMV, EBV, and ImmuKnow (additionally, repeat posa level if dose is changed).   - IST: Tacrolimus 0.5 mg BID (goal 8-10). AZA currently on HOLD (since 11/1 for pancytopenia), to be resumed at decreased dose of 50 mg daily on 11/8. Prednisone home/chronic dose of 5 mg daily to be resumed on 11/6 (currently finishing pred taper 10/28-11/5 for rash).   - PPX: Bactrim MWF for PJP ppx (lifelong). Posaconazole 300 mg daily for Aspergillus ppx (lifelong), increase dose to 400 mg daily if repeat posa level (11/3, 12:22) is subtherapeutic. Augmentin for ~2 weeks following all future IP bronchs that require dilation (or alternatively, 1 week out of each month) per txp ID.   - ABX: Augmentin through 11/7. Continue PTA Valcyte 450 mg MWF after HD.   - CLAD: Singulair, Advair, and azithromycin (azithro held briefly for prolonged QTc, now resolved).   - Notable cultures: Stenotrophomonas on BAL (11/1), per txp ID no need to treat.   - Imaging: Repeat chest CT at OP clinic appt 11/21.   - Procedures: IP bronchoscopy with custom silicone stent, likely first week of December.   - PFTs: Deferred until OP clinic appt 11/21.       #Recurrent right mainstem bronchial stenosis s/p balloon dilations  #Post-obstructive pneumonia in setting of recurrent RMB stenosis s/p balloon dilation, improved  #Actinomyces (+) BAL (10/17)  #Acute hypoxic respiratory failure, improved  Pt with 2month hx of cough c/f pneumonia. Hx BL lung transplant (2018) c/b right mainstem bronchial stenosis requiring serial treatments with balloon bronchoplasty procedures. Pt assessed by Transplant ID team throughout hospital course who characterized her history of repeated balloon dilations followed by worsening cough as post-obstructive pneumonia, likely from significant oral contamination by Actinomyces leading to slowly progressive pulmonary symptoms. Pt was treated with  Zosyn then with Augmentin for total abx course of 14 days. Interventional Pulmonology performed bronchoscopy and balloon dilation RMB and BAL RLL on 11/2. Plan is for Interventional Pulm team to create custom made silicon stent design for RMB stenosis. Pt will be contacted by Interventional Pulm to arrange bronchoscopy when stent ready after hospital discharge.   -Transplant ID & Transplant Pulm following  - C/w Augmentin 500 mg po qday (11/3-11/7) dosed after dialysis    #Erythroderma, improving  #Diffuse erythema, resolved  #Diffuse pruritus, resolved  #Hx dermatomyositis  Pt noted to have blanchable erythema diffusely throughout torso and extremities upon admission while in ED. Appears was acute in onset. Differential including recurrent dermatomyositis, viral exanthem (hx EBV viremia, CMV viremia on VGCL), or drug eruption. Pt had received zosyn prior to rash in ED, along with Augmentin the day before arrival to ED. Rash appeared prior to vancomycin being administered in ED, this was confirmed by ED pharmacist on staff day of admission. Dermatology consulted; not felt to be drug rash; unsure if acute DM flare d/t absence of shawl sign, holster sign, muscle weakness, however Pt with periorbital edema and plaques suggestive of DM. Diffuse erythema resolving, no pruritus since day of admission; however Pt now with near full-body erythrodermic eruption involving face, trunk, extremities. Rheumatology also following d/t concern that skin presentation could be 2/2 DM flare. Rheum panels ordered and being followed by rheum. Per Rheum, unlikely to be acute DM flare, however, reasonable to treat rash with prednisone burst and topical steroids, which was discussed and approved with transplant pulmonology. Cutaneous findings improved in response to steroids.   - Dermatology following:              -No indication for skin biopsy at this time  - Rheumatology following:              -Dermatomyositis serologies / rheum panels  followed by rheum  -Prednisone 20mg x3 days (completed), 15mg x3 days (completed), 10mg x3 days (11/3-11/5) then return to pta 5mg qday on 11/6  - c/w Atarax 25mg q6h PRN for itching    #LUE swelling, improved  #LLE swelling, improved  #L breast edema, chronic >1 yr, improved  #Lymphadenopathy (?reactive)- retroperitoneal, bilateral axillary, L retropectoral regions  Pt's L arm appearing asymmetrically swollen compared to R arm, initially noted 10/27/23. L arm swelling was a notable change from previous days of hospitalization.  On 10/28, LLE also more swollen and erythematous vs RLE.  Edema significant to the point that wedding ring had to be cut off with mechanical saw. D/t presence of unilateral extremity swelling, there is concern for DVT.  LUE US performed 10/27, 10/28, 10/30 all negative for DVT. B/l LE US 10/26, 10/28 negative for PE. Pt also has approx 1 yr history of peau d'orange appearance of L breast- of note had mammogram in 02/2023 which was negative for malignancy. Pt also at risk of post transplant lymphoproliferative disorders given hx of b/l lung transplant c/b EBV virema. Multiple prominent retroperitoneal and L periaortic (?reactive) lymph nodes noted on CT abdomen 10/28, PTLD not ruled out, per pulm transplant team. CT chest 10/28 with no evidence of PE, but multiple b/l axillary and L retropectoral (?reactive) lymph nodes noted, L breast skin thickening and breast edema of uncertain etiology noted. Have discussed c/f fistula issue v. steal syndrome with nephrology who after chart review and physical exam feels confident this is not source of edema, did not identify indication to pursue fistulogram at this point; however radiology recommending US of LUE AV fistula as reasonable initial test to assess for fistula dysfunction. Appreciating input from radiology for recommended imaging modalities for further assessment, considering compressive mass contributing to SVC picture as possible etiology of  LUE swelling.   US LUE AV dialysis fistula 11/1 demonstrating central venous stenosis/occlusion. Fistulogram with central segment venoplasty performed 11/2, brachiocephalic stenosis noted with multiple chest collaterals, discussed with IR, these findings correlate clinically with her LUE and chest/L breast edema, which have since resolved following venoplasty.      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 Pt also has hx of stenotrophomonas from 08/2023. Chest CT and serial CXR findings of bilateral perihilar airspace opacification, and small b/l pleural effusions remaining largely during hospitalization, no pneumothorax. For CLAD, was on azithromycin, singulair, advair PTA. Azithromycin held for period during current admission d/t prolonged Qtc on EKG, but was restarted 10/30 after Qtc normalization on EKG. Echo 10/27/23 showing normal appearing RV, pulmonary hypertension noted. Cough improved w/ use of tessalon pearles.   -C/w tessalon pearles 100mg q4h PRN  -Scheduled nebs for breathing  -Pulm transplant following:              -Wean supplemental O2 PRN to maintain o2 saturations >92%  -EBV levels per transplant pulm              -Restart azathioprine 50mg qday on 11/8 (after completing abx treatment for PNA)              -c/w tacrolimus. Check levels per translplant pulm              -c/w Bactrim for PJP ppx  -c/w azithromycin, singulair + advair for CLAD     #CMV viremia  #Leukopenia, resolved  Low-grade CMV viremia since 8/2023. Has been following with transplant ID team. Was started on VGCV three times weekly after HD at approx 10/6/23. VGCV dosing increased to daily beginning 10/27 given persisting low-level viremia on repeat CMV levels. WBC trending downwards over the past week 11.6 (10/25) --> 2.1 (10/31) following change in dosing frequency of VGCV.  SubQ  filgrastim 11/1 administered for WBC 1.6; WBC subsequently improving. Transplant ID followed, now signed off.  -CMV levels per transplant ID/transplant pulm  -C/w valgancyclovir 450 mg three times weekly on dialysis days after receiving dialysis. Due to neutropenia/leukopenia when taking at qday dosing.     #Cholelithiasis/tumefactive sludge w/o obstruction of CBD  #Acute abdominal pain, LLQ, resolving  #Watery diarrhea, resolved  #h/o C diff colitis  Sudden onset of acute LLQ abdominal pain in afternoon of 10/30. for sudden acute abdominal pain. Had a more formed BM this morning, no other stooling since. Anorexia currently without emesis. Lactate, CBC, BMP all wnl. AXR with nonobstructive bowel gas pattern. Etiology possibly 2/2 gastroenteritis. Pt with 6 watery BM's evening of 10/29. No watery diarrhea since 10/29. Two BM's that were more formed 10/30 and one again on morning of 10/31. Pt appears to have remote hx of C diff colitis. C diff testing returned negative this hospitalization. Abdominal US performed 11/2 after elevation in alk phosph c/f possible biliary obstruction- results demonstrating cholelithiasis / tumefactive sludge with some gallbladder wall thickening. No common bile duct obstruction identified.      #Recurrent transient hypotension w/o altered mentation, improved  Rapid response was called overnight on 10/26 and 10/27 d/t hypotension, tachycardia, and tachypnea. Doppler venous US of bilateral lower extremities on 10/26 with no DVT. PTA metoprolol held. Lactate 1.2. Temperature remains < 100.4* F. Hypotension responsive to albumin and midodrine. Bedside cardiac ultrasound  10/27 with collapsible IVC. Main concern on differential remains sepsis 2/2 suspected pulmonary infection, drug reaction, or possible adrenal insufficiency. However, episodic hypotension may be within Pt's normal range, as Pt continues to remain asymptomatic during RRTs. TTE completed 10/27 w/ EF 60-65%. Pulmonary  hypertension noted.  - C/w midodrine 10 mg TID      #Prolongation of Qtc, resolved  #Hx Arrythmia (Afib)  EKG 10/26/23 with prolonged Esm=777; normalized to 455 on EKG 10/30.  EKG 10/27 demonstrating Afib.       #ESRD on HD (M, W, F)  #Anemia of CKD, macrocytic  #Hypophosphatemia  #Hx Hyperphosphatemia  Hx of ESRD on HD, currently M, W, F.  Phoslo held on 10/29 due downtrending phos; recheck notable for phos 1.1; required IV + PO supplementation with resolution of hypophosphatemia, continuing to monitor trend. Low Hg c/w noted hx of anemia of CKD  -C/w iHD per renal dialysis team, restart with home dialysis team  -ESRD service consulted:              -c/w PTA vitamins              -Epo/iron per nephrology              -hold phoslo d/t recent hypophosphatemia     #Hx Left Sided Aspergillus Empyema:  First noted in 2019. Needle aspiration confirmatory of aspergillus fumigatus, now s/p intrapleural amphotericin bead placement. Discussions had regarding surgery, however, surgery felt to be associated with too high of morbidity, and indefinite posaconazole decided as plan.  -Transplant pulmonology following              - c/w PTA posaconazole. Check levels per transplant pulm.    Consultations This Hospital Stay   NURSING TO CONSULT FOR VASCULAR ACCESS CARE IP CONSULT  PHARMACY TO DOSE VANCO  PULMONARY CF/TRANSPLANT ADULT IP CONSULT  NEPHROLOGY ESRD ADULT IP CONSULT  INFECTIOUS DISEASE TRANSPLANT SOT ADULT IP CONSULT  NURSING TO CONSULT FOR VASCULAR ACCESS CARE IP CONSULT  NURSING TO CONSULT FOR VASCULAR ACCESS CARE IP CONSULT  CARE MANAGEMENT / SOCIAL WORK IP CONSULT  DERMATOLOGY IP CONSULT  RHEUMATOLOGY IP CONSULT  NUTRITION SERVICES ADULT IP CONSULT  LYMPHEDEMA THERAPY IP CONSULT  INTERVENTIONAL PULMONARY ADULT IP CONSULT  INTERVENTIONAL RADIOLOGY ADULT/PEDS IP CONSULT    Code Status   Prior       The patient was discussed with Dr. Candido Shah MD  [address]  [facility]  [address]  [place] [address]  Phone: [phone]  ______________________________________________________________________    Physical Exam   Vital Signs:                    Weight: 131 lbs 3.2 oz  General appearance: NAD, awake, alert, pleasant. Sitting upright in bed.  Respiratory: intermittent coughing. Normal respiratory effort on NC O2. Minimal wheezing bilaterally, significantly improved from previous  Extremities: LUE edema notably reduced. RUE and LUE hand and forearm appear symmetric in size. LUE upper arm (around AV fistula site) > RUE upper arm edema.   Skin: no diffuse erythema as previous. Dry flaky skin minimal across body.       Primary Care Physician   Ame Chow    Discharge Orders      Comprehensive metabolic panel     Tacrolimus by Tandem Mass Spectrometry     Reason for your hospital stay    Post-obstructive PNA     Activity    Your activity upon discharge: activity as tolerated     Discharge Instructions    Per Pulmonology:  Discharge Recommendations:  - Nebs:  Xopenex TID and Mucomyst TID.  Pulmicort daily PRN.  - Outstanding labs:  Tacrolimus level (11/4) pending.  Posaconazole level (11/3) pending (of note, the 11/3, 12:22 level will be accurate;  disregard the 11/3, 05:56 level).  - Routine labs:  CMV weekly to continue for now until two consecutive CMV levels in a row one week apart, then CMV every other week (negative on 11/6).  - Future labs:  In ~1 week, obtain CMP, CBC w/diff, tacrolimus, and CMV.  At next OP appt on 11/21, obtain CMP, CBC w/diff, tacrolimus, CMV, EBV, and ImmuKnow (additionally, repeat posa level if dose is changed).  - IST:  Tacrolimus 0.5 mg BID (goal 8-10).  AZA currently on HOLD (since 11/1 for pancytopenia), to be resumed at decreased dose of 50 mg daily on 11/8.  Prednisone home/chronic dose of 5 mg daily to be resumed on 11/6 (currently finishing pred taper 10/28-11/5 for rash).  - PPX:  Bactrim MWF for PJP ppx (lifelong).  Posaconazole 300  mg daily for Aspergillus ppx (lifelong),  increase dose to 400 mg daily if repeat  posa level (11/3, 12:22) is subtherapeutic.  Augmentin for ~2 weeks following all future IP bronchs that require dilation (or alternatively, 1 week out of each month) per txp ID.  - ABX:  Augmentin through 11/7.  Continue PTA Valcyte 450 mg MWF after HD.    - CLAD:  Singulair, Advair, and azithromycin (azithro held briefly for prolonged QTc, now resolved).  - Notable cultures:  Stenotrophomonas on BAL (11/1), per txp ID no need to treat.  - Imaging:  Repeat chest CT at OP clinic appt 11/21.  - Procedures:  IP bronchoscopy with custom silicone stent, likely first week of December.  - PFTs:  Deferred until OP clinic appt 11/21.  - Other:  Continue MWF  HD.  PTA home O2 to continue.  Follow-up of LUE / left breast per PCP.    - Outpatient follow-up:  Scheduled 11/21 with pulmonary transplant.  Additionally transplant ID on 12/1.     Follow Up and recommended labs and tests    Follow up with primary care provider, Ame Chow, already scheduled     Enteric Isolation     CMV Quantitative, PCR     Diet    Follow this diet upon discharge: Orders Placed This Encounter      Renal Diet (dialysis)     CBC with Platelets & Differential       Significant Results and Procedures   Results for orders placed or performed during the hospital encounter of 10/25/23   XR Chest 2 Views    Narrative    XR CHEST 2 VIEWS  10/25/2023 1:10 PM     HISTORY:  cough, B lung transplant       COMPARISON:  CT chest 10/24/2023, and chest radiograph 9/7/2023    TECHNIQUE: XR CHEST 2 VIEWS    FINDINGS:   Unchanged postsurgical changes related to bilateral lung transplant  including midline sternotomy wires, and right axillary and mediastinal  surgical clips.  Low lung volumes with scattered basilar predominant linear  interstitial opacities, particularly at the right lung base. Bilateral  perihilar airspace opacification.  Small bilateral pleural effusions. No  pneumothorax. Cardiac silhouette  is normal.        Impression    IMPRESSION:  Low lung volumes with findings reflective of moderate pulmonary edema.  Cannot rule out superimposed infectious process    I have personally reviewed the examination and initial interpretation  and I agree with the findings.    TERRI ISSA MD         SYSTEM ID:  [id number]   US Upper Extremity Venous Duplex Left    Narrative    EXAMINATION: DOPPLER VENOUS ULTRASOUND OF THE LEFT UPPER EXTREMITY,  10/25/2023 3:21 PM     COMPARISON: None.    HISTORY: Marked left upper extremity swelling. Dialysis fistula is  present in the left upper extremity.    TECHNIQUE:  Gray-scale evaluation with compression, spectral flow and  color Doppler assessment of the deep venous system of the left upper  extremity.    FINDINGS:    Left: Normal blood flow and waveforms are demonstrated in the internal  jugular, innominate, subclavian, and axillary veins. There is normal  compressibility of the brachial, basilic and cephalic veins.      Impression    IMPRESSION:  No left upper extremity deep venous thrombosis  demonstrated.    I have personally reviewed the examination and initial interpretation  and I agree with the findings.    EDIE VILLA MD         SYSTEM ID:  [id number]   US Lower Extremity Venous Duplex Bilateral    Narrative    EXAMINATION: DOPPLER VENOUS ULTRASOUND OF BILATERAL LOWER EXTREMITIES,  10/26/2023 2:09 AM     COMPARISON: Lower extremity venous duplex ultrasound 10/6/2019    HISTORY: Rule out PE in ESRD patient with hypotension, tachycardia,  not responding to fluids    TECHNIQUE:  Gray-scale evaluation with compression, spectral flow and  color Doppler assessment of the deep venous system of both legs from  groin to knee, and then at the ankles.    FINDINGS:  In both lower extremities, the common femoral, femoral, popliteal and  posterior tibial veins demonstrate normal compressibility and blood  flow.      Impression     IMPRESSION:  No evidence of deep venous thrombosis in either lower extremity.    I have personally reviewed the examination and initial interpretation  and I agree with the findings.    REJI VIVAS DO         SYSTEM ID:  [id number]   XR Chest 2 Views    Narrative    XR CHEST 2 VIEWS  10/27/2023 9:46 AM     HISTORY:  S/p lung transpant, f/u consolidations       COMPARISON:  Chest radiograph 10/25/2023, CT chest 10/24/2023    TECHNIQUE: XR CHEST 2 VIEWS    FINDINGS:   Mild by basilar predominant mixed airspace and interstitial opacities,  with otherwise interval improved aeration compared to prior  radiograph.  Small bilateral pleural effusions. No pneumothorax. Cardiac silhouette  is normal.        Impression    IMPRESSION:  Persistent bibasilar mild mixed interstitial and airspace  opacification, with improved aeration compared to 10/25/2023. Small  bilateral pleural effusions.    I have personally reviewed the examination and initial interpretation  and I agree with the findings.    TERRI ISSA MD         SYSTEM ID:  [id number]   US Upper Extremity Venous Duplex Left    Narrative    EXAMINATION: DOPPLER VENOUS ULTRASOUND OF THE LEFT UPPER EXTREMITY,  10/27/2023 11:51 AM     COMPARISON: Ultrasound 10/25/2023, CT 10/24/2023    HISTORY: Isolated edema of left upper extremity, assess for DVT     TECHNIQUE:  Gray-scale evaluation with compression, spectral flow and  color Doppler assessment of the deep venous system of the left upper  extremity.    FINDINGS:    Left: Normal blood flow and waveforms are demonstrated in the internal  jugular, innominate, subclavian, and axillary veins. There is normal  compressibility of the brachial, basilic and cephalic veins.    Subcutaneous soft tissue thickening in the distal left upper extremity  may represent subcutaneous edema.    Multiple left cervical lymph nodes incidentally seen, each retaining  fatty marguerite, the largest measures 0.9 cm short axis.      Impression     IMPRESSION:    1. No deep venous thrombosis demonstrated in the left upper extremity.  2. Incidental left cervical lymphadenopathy, possibly reactive,  consider attention on follow-up.  3. Subcutaneous soft tissue thickening in the distal left upper  extremity may represent subcutaneous edema versus cellulitis in the  appropriate clinical settings.    I have personally reviewed the examination and initial interpretation  and I agree with the findings.    EDIE VILLA MD         SYSTEM ID:  [id number]   CT Chest Pulmonary Embolism w Contrast    Narrative    CT chest pulmonary angiogram with contrast    INDICATION: Assess for pulmonary embolism. Calcified left arm  swelling.    Contrast: 53 mL intravenous [id number]    COMPARISON: CT chest 10/24/2023    FINDINGS: Right upper extremity injection. Thyroid appears  unremarkable. Left breast skin thickening edema. Atherosclerotic  calcifications of the thoracic aorta. Aorta size normal. The pulmonary  artery caliber is upper normal, 3.2 cm. Multiple upper normal size  bilateral axillary lymph nodes may be reactive. No enlarged  mediastinal or hilar or internal mammary nodes. No enlarged  retroperitoneal nodes. There is left retropectoral (series 4 image  38).  Pulmonary system was reasonably well-opacified with contrast. No  hypodense filling defect in the pulmonary artery system to indicate  embolus.  Small pleural effusions bilaterally. No pericardial effusion. Mild  cardiomegaly.  Lungs are transplanted and show patchy opacities throughout the right  lower lobe especially with loculated pleural fluid on the left common  fissural thickening or other edema in the lungs. Right lower lobe  patchy edema rather than infectious consolidation also present. The  pleural effusions are similar. The edema appears decreased in the  lungs.  Bone detail shows osteopenia. Median sternotomy      Impression    IMPRESSION:  1. No CT evidence of acute pulmonary embolus.  2. Decreased  edema in the lungs with continued small pleural  effusions. Differential could be improving atypical infection.  3. Bilateral lung transplantation.  4. Cardiomegaly.  5. Left breast skin thickening and breast edema of uncertain etiology.    TERRI ISSA MD         SYSTEM ID:  [id number]   US Upper Extremity Non Vascular Left    Narrative    EXAMINATION: Doppler evaluation of the left axilla.    COMPARISON: None.    HISTORY: Assess drainage, has unilateral left-sided swelling and axial    TECHNIQUE:  Gray-scale evaluation with color Doppler of the left  axilla.    FINDINGS: Within the area of interest at the left axilla there is mild  soft tissue edema. No drainable fluid collection.       Impression    IMPRESSION: No drainable fluid collection within the area of interest  in the left axilla. Mild soft tissue edema.    I have personally reviewed the examination and initial interpretation  and I agree with the findings.    REJI VIVAS DO         SYSTEM ID:  [id number]   US Lower Extremity Venous Duplex Left    Narrative    EXAMINATION: DOPPLER VENOUS ULTRASOUND OF THE LEFT LOWER EXTREMITY,  10/28/2023 4:40 PM     COMPARISON: Lower extremity venous ultrasound 7/26/2023    HISTORY: Assess for DVT, unilateral left leg swelling    TECHNIQUE:  Gray-scale evaluation with compression, spectral flow, and  color Doppler assessment of the deep venous system of the proximal  right leg and left leg from groin to knee, and then at the ankle.    FINDINGS:  In the left lower extremity, the common femoral, femoral, popliteal  and posterior tibial veins demonstrate normal compressibility and  blood flow.    Within the left groin there is a 1.5 x 0.4 x 1.0 lymph node, within  normal limits.    In the proximal right lower extremity, the common femoral vein  demonstrates normal compressibility and blood flow.      Impression    IMPRESSION:  1.  No evidence left lower extremity deep venous thrombosis.    I have personally reviewed the  examination and initial interpretation  and I agree with the findings.    REJI VIVAS DO         SYSTEM ID:  [id number]   CT Abdomen Pelvis w/o Contrast    Narrative    EXAMINATION: CT ABDOMEN PELVIS W/O CONTRAST  10/28/2023 2:12 PM      CLINICAL HISTORY: new L lymphadenopathy, L sided extremity swelling,  look for additional LAD    COMPARISON: 2/9/2021, 10/27/2023    PROCEDURE COMMENTS: CT of the abdomen was performed without   intravenous or oral contrast. Coronal and sagittal reformatted images  were obtained.    FINDINGS:  Lower thorax:   Status post lung transplant. There is a airspace consolidation in the  lower lobes, right middle lobe and lingula with small pleural  effusions and compressive atelectasis. Calcification in the medial  left lower lobe pleural space is unchanged. Calcified right hilar  lymph node. Cardiomegaly and trace pericardial effusion. Persistent  abnormal skin thickening of the left breast is partially visualized.  Partially imaged sternotomy wires.    Abdomen and pelvis:  Hepatomegaly without cirrhotic morphology. Cholelithiasis without  acute cholecystitis, however the gallbladder is mildly hydropic.  Normal caliber biliary system. Normal pancreas. Borderline prominent  spleen. Normal adrenal glands. Atrophic kidneys with a cyst noted  within the lower pole right kidney and bilateral hilar vascular  calcifications.     Decompressed urinary bladder with intravesicular contrast.     Rectosigmoid distention with liquid stool. Fluid-filled small bowel  loops. Normal appendix.    Diffuse mesenteric edema and trace ascites. Normal-appearing uterus  and adnexa. Multiple prominent retroperitoneal lymph nodes are noted.  For example, a conglomeration of left periaortic nodes with  significant surrounding inflammatory fat stranding measuring up to 11  mm in short axis (series 2 images 38-51). No intra-abdominal free air.  Mild aortobiiliac atherosclerotic without aneurysm.    Osseous structures:    No acute abnormalities.      Impression    IMPRESSION:  1. Basilar airspace consolidation in the visualized lungs. There is  superimposed pulmonary edema.  2. Asymmetric anasarca, left greater than right, and trace ascites.  3. Mildly prominent retroperitoneal lymphadenopathy may be reactive,  however post transplant lymphoproliferative disease cannot be  excluded.  4. Cholelithiasis without acute cholecystitis.    I have personally reviewed the examination and initial interpretation  and I agree with the findings.    REJI VIVAS DO         SYSTEM ID:  [id number]   US Upper Extremity Venous Duplex Left    Narrative    EXAMINATION: DOPPLER VENOUS ULTRASOUND OF THE LEFT UPPER EXTREMITY,  10/30/2023 1:58 PM     COMPARISON: Left upper extremity venous duplex 10/20/2023    HISTORY: Ongoing left upper extremity swelling, reevaluate for  possible clot    TECHNIQUE:  Gray-scale evaluation with compression, spectral flow and  color Doppler assessment of the deep venous system of the left upper  extremity.    FINDINGS:  Left: No thrombus is demonstrated in the internal jugular, innominate,  subclavian, and axillary veins. There is normal compressibility of the  basilic and cephalic veins. There is normal compressibility of the  upper and lower portions of the brachial vein; Please note the middle  portion of the brachial vein was not evaluated due to an overlying  bandage.    There is retrograde flow in the left axillary vein, mid subclavian  vein, and left cephalic vein near its confluence to the subclavian  vein, likely related to dialysis access.      Impression    IMPRESSION:  No evidence of left upper extremity deep venous thrombosis.    I have personally reviewed the examination and initial interpretation  and I agree with the findings.    JAMES LAI MD         SYSTEM ID:  [id number]   MR Chest w/o & w Contrast    Narrative    MRI chest with and without contrast    Indication persistent marked left upper  extremity swelling    COMPARISON: 10/10/2019    FINDINGS: Images obtained before and after intravenous administration  of 6 mL Gadavist contrast.  Loculated left pleural effusion. Trace right layering pleural  effusion. Median sternotomy artifact.  Cord and CSF signals appear unremarkable. Aorta normal size. Main  pulmonary artery mildly enlarged at 3.4 cm. The no obvious brachial  plexus mass on either side. Fatty muscle atrophy throughout. No  enlarged lymph nodes in the chest. Benign morphologically appearing  bilateral axillary lymph nodes with fatty marguerite.   The included upper abdomen shows pericholecystic fluid with a  nondistended gallbladder. No pericardial effusion. No streaky  perihilar opacities relatively similar to chest CT of 10/10/2019  consistent with chronic probable inflammatory/infectious process.  Postcontrast imaging shows normal appearance of the left subclavian  artery..  Prominent lymph nodes possibly noted in the left supraclavicular  space. Density near the left paravertebral lobe pleural space with rim  hyperdensity on recent CT 10/27/2023 showing internal signal on DWI an  lack of enhancement. This may represent a healed infection. History of  prior bilateral lung transplant.  Subcutaneous edema also in the left breast tissues and subcutaneous  left chest wall compared to the right.      Impression    IMPRESSION:  1. Mildly prominent left supraclavicular lymph nodes. Grossly similar  to recent CT scan of the chest 4 days ago.  2. Diffuse muscle atrophy.  3. Abnormal density anterior posterior left lower rib in the left  pleural space which was peripherally calcified on the recent CT. This  may be old inflammatory/infectious process.  4. Loculated left pleural effusion with small free-flowing right  pleural effusion. 9 skin thickening of left breast.  5. Skin thickening of left breast. Please correlate with dedicated  breast imaging as appropriate. Edema in the left breast tissues as  well  as left chest wall. Possible inflammatory/infectious process.  6. Bilateral lung transplant history.  7. Nondistended gallbladder with small amount of pericholecystic fluid  in patient with reported previous history cholelithiasis.    TERRI ISSA MD         SYSTEM ID:  [id number]   XR Abdomen 1 View    Narrative    EXAMINATION:  XR ABDOMEN 1 VIEW 10/30/2023 6:04 PM     COMPARISON: CT abdomen and pelvis 10/28/2023    HISTORY: acute abdominal pain    TECHNIQUE: Supine frontal views of the abdomen.    FINDINGS: Air-filled, nondilated loops of small and large bowel.  Vascular calcifications visualized within the pelvis. Partially  visualized sternotomy wires appear intact..  No free air, pneumatosis  or portal venous gas.       Impression    IMPRESSION: Nonobstructive bowel gas pattern.    I have personally reviewed the examination and initial interpretation  and I agree with the findings.    ANA CRISOSTOMO DO         SYSTEM ID:  [id number]   XR Chest 1 View    Narrative    EXAM: XR CHEST 1 VIEW 10/30/2023 6:04 PM      HISTORY: interval follow up, lung transplant.    COMPARISON: CT PE and chest radiograph 10/27/2023.     TECHNIQUE: Frontal view of the chest.    FINDINGS:   Median sternotomy wires appear intact. Postoperative clips scattered  throughout the mediastinum. Perihilar and basilar predominant mixed  airspace and interstitial opacities are not significantly changed.  Bilateral costophrenic angles are blunted. Cardiac silhouette stable.  No appreciable pneumothorax.      Impression    IMPRESSION:   1. Persistent perihilar bibasilar mixed interstitial airspace  opacities, not significantly changed compared to 10/27/2023.  2. Small bilateral pleural effusions.    I have personally reviewed the examination and initial interpretation  and I agree with the findings.    ANA CRISOSTOMO DO         SYSTEM ID:  [id number]   US Ext Arterial Venous Dialys Acs Graft    Narrative    ULTRASOUND EXTREMITY ARTERIAL VENOUS  DIALYSIS ACCESS GRAFT 11/1/2023  11:31 AM    CLINICAL HISTORY: Dialysis fistula on LUE, having LUE swelling no  clear etiology. Left axillary to cephalic graft. Fistulogram and  declot 3/2/2021.    COMPARISONS: Fistulogram and declot 3/2/2021.    REFERRING PROVIDER: GISSEL SONG    TECHNIQUE: Left arm dialysis graft evaluated with grayscale, color  Doppler, and spectral pulsed wave Doppler ultrasound. Flow volumes  obtained.    FINDINGS: LEFT:  Subclavian artery: 119/36 cm/s    Axillary artery, above anastomosis: 181/67 cm/s  Axillary artery, above anastomosis: 6.8 mm, 1240 cc/min Vol Flow    Brachial artery, below anastomosis: 102/0 cm/s, antegrade    Arterial anastomosis: 3.6 mm, 420 cm/s, 2.32 velocity ratio    Graft, distal to anastomosis: 5.9 mm, 561 cm/s  Graft, distal to anastomosis: 6.1mm, 362 cm/s  Graft, upper arm: 6.4 mm, 244 cm/s  Graft, upper arm: 6.1 mm, 1207 cc/min Vol Flow  Graft, upper arm: 15.6 mm, 112 cm/s, mural thrombus causes minimal  narrowing  Graft, mid arm: 12.2 mm, 128 cm/s  Graft, above antecubital fossa: 8.2 mm, 127/63 cm/s  Graft, above antecubital fossa: 7.6 mm, 1267 cc/min Vol Flow  Graft, above venous anastomosis: 7.4 mm, 142 cm/s    Venous anastomosis: 5.3 mm, 125 cm/s    Cephalic vein, antecubital fossa, above anastomosis: 13.3 mm, 169 cm/s  Cephalic vein, antecubital fossa: 7.2 mm, 179 cm/s  Cephalic vein, antecubital fossa: 6.0 mm, 1085 cc/min Vol Flow  Cephalic vein, mid arm: 8.0 mm, 117 cm/s  Cephalic vein, upper arm: 8.6 mm, 93 cm/s  Cephalic vein, shoulder: 7.9 mm, 126 cm/s  Cephalic vein, peripheral to confluence: 7.2 mm, 154 cm/s  Cephalic vein, nearing confluence: 232 cm/s    Cephalic subclavian confluence not well visualized.    Cephalic subclavian confluence: 5.1 mm, 153 cm/s, 194 cm/s    Subclavian vein, medial to confluence: 228 / -102 cm/s, bidirectional    Subclavian vein, mid: 59 cm/s, retrograde  Axillary vein: 76 cm/s, retrograde    Innominate vein: not  visualized    Internal jugular vein: occluded      Impression    IMPRESSION: Left axillary to cephalic graft.  1. No arterial inflow stenosis demonstrated.    2. Arterial anastomosis patent. 3.6 mm, 420 cm/s, 2.32 velocity ratio.    3. Graft patent. 15.6 mm pseudoaneurysm in the upper arm with mural  thrombus causing minimal narrowing. 12.2 mm pseudoaneurysm in the mid  arm. Flow volumes in the graft well exceed 600 cc/min (1207 cc/min in  the upper arm and 1085 cc/min above the antecubital fossa.    4. Venous anastomosis patent. 5.1 mm, 153 cm/s, 194 cm/s    5. Outflow cephalic vein patent. Pseudoaneurysmal or aneurysmal  dilation in the antecubital fossa to 13.3 mm diameter. Flow volumes  exceed 600 cm/s.    6. Fistulogram demonstrated two cephalic subclavian confluences. Only  1 demonstrated in this study. [place] not well visualized.    7. Central venous stenosis or occlusion suggested. Fistulogram could  be performed for further evaluation and possible treatment. Innominate  vein not visualized. Bidirectional flow in the subclavian vein medial  to the cephalic subclavian confluence. Retrograde flow in subclavian  vein lateral to the confluence and in the axillary vein.    I have personally reviewed the examination and initial interpretation  and I agree with the findings.    OBIE DAWSON MD         SYSTEM ID:  [id number]   CT Chest w Contrast    Narrative    EXAM: CT CHEST W CONTRAST 11/1/2023 9:14 PM    HISTORY: 62 years Female Evaluate for possible clot/mass/lymphatic  obstruction/inflammatory cause of LUE swelling, please include LUE    COMPARISON: CT chest 10/27/2023, MR chest 10/31/2023.    TECHNIQUE: Helical CT imaging of the chest was obtained with contrast.  Multiplanar post-processed and MIP reformats were obtained reviewed.  The left upper extremity was additionally included within the  field-of-view.    FINDINGS:  LINES/TUBES: None.    MEDIASTINUM: Prominent left inferior cervical lymph nodes.  Mildly  enlarged bilateral pulmonary window lymph node measuring 1.0 cm,  subcarinal node measuring 1.1 cm an additional subcentimeter  paratracheal lymph nodes. Normal size/configuration of the great  vessels. Normal heart size. No pericardial effusion. Atherosclerotic  calcifications of the aortic arch. Patent vascular anastomoses.    LUNG: Postsurgical change of bilateral lung transplantation. The  bronchial anastomoses are patent. There is unchanged mild narrowing of  the right mainstem bronchus just distal to the anastomosis. Similar  appearance of patchy peribronchial vascular opacities predominantly in  the right lower lobe, and to a lesser degree the left upper lobe, left  lower lobe, and right middle lobe. There is decreased septal  thickening in the right upper lobe.     PLEURA: No pneumothorax. Small loculated left pleural effusion. Trace  right pleural effusion. Unchanged peripherally calcified 2.6 x 1.7 cm  soft tissue density within the inferior medial left pleural space.    LOWER NECK: Thyroid unremarkable.    UPPER ABDOMEN: Esophagus appears unremarkable. Cholelithiasis with  mildly prominent gallbladder wall and small volume pericholecystic  fluid. Mildly dilated common bile duct measuring up to 1.1 cm. Mild  dilation of the main pancreatic duct without visualized pancreatic  mass.    BONES: No acute osseous abnormality. No suspicious bony lesions.    SOFT TISSUES: Overlying subcutaneous thickening and soft tissue fat  stranding involving the left breast.     Lymph nodes: Ongoing prominent and enlarged axillary lymph nodes. For  example there is a 1.5 x 1.1 cm left axillary lymph node (series 4,  image 80), previously 1.6 x 1.1 cm, as well as a right axillary lymph  node measuring 1.6 x 1.1 cm (series 4, image 64), previously 1.4 x 1.1  cm. Numerous prominent supraclavicular and mediastinal lymph nodes,  unchanged compared to prior.    Left upper extremity: Postsurgical changes of left upper  extremity  arterial venous fistula formation with multifocal areas of dilation.  Vasculature appears patent. 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  Soft tissue swelling throughout the left upper extremity.      Impression    IMPRESSION:   1.  The left upper extremity vasculature, including the arteriovenous  fistula graft, appear patent with known pseudoaneurysms in the upper  and mid arm. Left upper extremity subcutaneous edema. No additional  findings identified in the left upper extremity to explain patient's  symptoms.  2.  Ongoing left breast subcutaneous edema and skin thickening with  adjacent lymphadenopathy, likely infectious/inflammatory.  3.  Unchanged axillary, mediastinal, and supraclavicular  lymphadenopathy.  4.  Cholelithiasis with pericholecystic fluid, mild wall thickening,  and mild dilation of the common bile duct and main pancreatic duct.  Recommend correlation with clinical symptoms. Consider further  evaluation with ultrasound versus MRCP if concern for  cholecystitis/biliary obstruction.  5.  Ongoing but slightly improved pulmonary opacities compared to  10/27/2023. Differential includes pulmonary edema and/or infection.  6.  Stable loculated left pleural effusion and trace right pleural  effusion.    I have personally reviewed the examination and initial interpretation  and I agree with the findings.    SUNITA ADKINS MD         SYSTEM ID:  [id number]   IR Dialysis Fistulogram Left    Narrative    PROCEDURE: Dialysis circuit evaluation and interventions 11/2/2023    Procedural Personnel  Attending physician(s): LLUVIA Rodriguez MD  Fellow physician(s): BRODERICK Urbano MD  Resident physician(s): None  Advanced practice provider(s): None    Pre-procedure diagnosis: Functioning fistulagram, arm swelling  Post-procedure diagnosis: Same  Indication: Arm swelling  Additional clinical history: None    Complications: No immediate  complications.      Impression    IMPRESSION:  Fistulagram demonstrates significant recurrent narrowing of the left  innominate vein/medial subclavian vein with significant chest  collaterals. Segment was venoplastied to 10 mm followed by application  of 12 mm drug coated balloon with good angiographic results.     Plan:  The graft can be used for dialysis immediately.  _______________________________________________________________    PROCEDURE SUMMARY:  - Access of dialysis circuit with ultrasound guidance  - Procedure: Angiogram and central segment angioplasty  - Additional procedure(s): None    PROCEDURE DETAILS:    Pre-procedure  Consent: Informed consent for the procedure including risks, benefits  and alternatives was obtained and time-out was performed prior to the  procedure.  Preparation: The site was prepared and draped using maximal sterile  barrier technique including cutaneous antisepsis.    Anesthesia/sedation  Level of anesthesia/sedation: Moderate sedation (conscious sedation)  Anesthesia/sedation administered by: Independent trained observer  under attending supervision with continuous monitoring of the  patient?s level of consciousness and physiologic status  Total intra-service sedation time (minutes): 38  4mg zofran IV pre procedure, 2 mg versed IV, 100 mcg fentanyl IV?    Initial dialysis circuit evaluation  Dialysis circuit side: Left  Dialysis circuit type: Upper arm loop graft  Initial circuit palpation: Pulse but no thrill    Access (towards outflow)  Local anesthesia was administered. The dialysis circuit was  sonographically evaluated and judged to be patent. Real time  ultrasound was used to visualize needle entry into the dialysis  circuit and a permanent image was stored..   Access technique: Micropuncture set with 21 gauge needle  Sheath size (Swedish): 7    Initial angiography  Initial angiography of the dialysis circuit, outflow veins, and  central veins was performed.  Findings:  Fistulagram demonstrates significant recurrent narrowing of  the left innominate vein/medial subclavian vein with significant chest  collaterals.     Angioplasty  Location: Left innominate vein/medial SCV  Angioplasty balloon: 10 mm [place] followed by 12 mm Lutonix  Angioplasty balloon type: Conventional followed by Lutonix    Final angiography  Findings: Significant caliber improvement of segment and resolution of  chest wall collaterals  Final dialysis circuit palpation: Palpable thrill    Reflux angiogram showed axillary artery anastomosis with no evidence  of stenosis.    Closure (outflow)  The sheath was removed and hemostasis was achieved with an external  device. A sterile dressing was applied.    Radiation Dose  Fluoroscopy time (minutes): 4.1    Reference air kerma (mGy): 6.0   Kerma area product (uGy-m2): 694.87      Additional Details  Additional description of procedure: None  Registry event: V/3/g  Device used: None  Equipment details: None  Unique Device Identifiers: Not available  Specimens removed: None  Estimated blood loss (mL): Less than 10  Standardized report: [email or web address]tion_v3.1    Attestation  Signer name: CLAUDINE ESTRADA MD  I attest that I was present for the key elements of the procedure and  immediately available. I reviewed the stored images and agree with the  report as written.    I have personally reviewed the examination and initial interpretation  and I agree with the findings.    CLAUDINE ESTRADA MD         SYSTEM ID:  [id number]   US Abdomen Limited    Narrative    EXAMINATION: Limited Abdominal Ultrasound, 11/2/2023 9:52 AM     COMPARISON: CT abdomen and pelvis 10/28/2023    HISTORY: Cholelithiasis on CT, elevated alk phos, RUQ US    FINDINGS:     Fluid: No evidence of ascites or pleural effusions.    Liver: The liver demonstrates increased echogenicity, measuring 19.9  cm in craniocaudal dimension. There is no focal mass.     Gallbladder:  Cholelithiasis/tumefactive sludge. No gallbladder wall  thickening or pericholecystic fluid. Negative sonographic Swanson sign.    Bile Ducts: Both the intra- and extrahepatic biliary system are of  normal caliber.  The common bile duct measures 7 mm in diameter.    Pancreas: Visualized portions of the head and body of the pancreas are  unremarkable. Pancreas is partially obscured    Kidney: The right kidney measures 7.8 cm long. Simple cyst of the  right kidney measuring 1.4 x 1.7 x 1.1 cm. Echogenic appearance of the  renal parenchyma. There is no hydronephrosis or hydroureter, or no  shadowing renal calculi.      Impression    IMPRESSION:       1. Increased hepatic parenchymal echogenicity which may be seen with  parenchymal liver disease including hepatic steatosis.  2. Mild hepatomegaly. No focal liver lesion demonstrated.  3. Cholelithiasis/tumefactive sludge with borderline gallbladder wall  thickening, no additional signs for cholecystitis  4. Common bile duct size is at upper limits of normal measuring 7 mm.  No obstructing lesion seen in the visualized common bile duct  5. Small right kidney with increased parenchymal echogenicity which  may be seen with chronic renal parenchymal disease    I have personally reviewed the examination and initial interpretation  and I agree with the findings.    EDIE VILLA MD         SYSTEM ID:  [id number]   XR Chest 1 View    Narrative    XR CHEST 1 VIEW  11/2/2023 9:40 AM     HISTORY:  s/p BSLT in 2018; bronchoscopy with right middle lobe  bronchus and balloon dilation 10/17/2023.    COMPARISON:  CT chest 11/1/2023    TECHNIQUE: XR CHEST 1 VIEW    FINDINGS:   Postsurgical changes related to bilateral lung transplant in 2018.  Low lung volumes with mixed hazy airspace and reticular opacities  diffusely in the left greater than right bilateral lungs. Moderate  right and small/moderate left pleural effusions with opacification of  the bilateral hemidiaphragms and bibasilar  atelectasis.  No pneumothorax. Cardiac silhouette is normal.      Impression    IMPRESSION:  Moderate right and small/moderate left pleural effusions with  bibasilar atelectasis. Scattered hazy airspace opacities which may  reflect pulmonary edema versus superimposed infectious process, in the  right clinical context    I have personally reviewed the examination and initial interpretation  and I agree with the findings.    ADAM GONZALEZ MD         SYSTEM ID:  [id number]   XR Chest 2 Views    Narrative    Exam: XR CHEST 2 VIEWS, 2023 8:03 AM    Indication: s/p BSLT in 2018    Comparison: Chest x-ray 2023    Findings: PA and lateral chest radiograph. Sternotomy wires are  intact. Trachea is midline. Cardiac silhouette is partially obscured.  Left costophrenic angle is sharp. The right costophrenic angle is  mildly blunted. Improved predominantly perihilar and bibasilar  streaky/hazy opacities when compared to previous exam on 2023  with improved aeration of lungs. Prominent loops of small bowel in the  partially visualized upper abdomen. Oxygen tubing outside the patient  overlying the epigastrium and mediastinum.      Impression    Impression:   1. Improved aeration through the lung fields with persistent  predominant bilateral perihilar and bibasilar streaky/hazy opacities,  congestion versus atelectasis.  2. Prominent loops of small bowel within the partially visualized  upper abdomen    I have personally reviewed the examination and initial interpretation  and I agree with the findings.    NITHYA GUIDO MD         SYSTEM ID:  [id number]   Echocardiogram Complete     Value    LVEF  55-60%    LVEF  60-65%    Narrative    [id number]  [id number]  [id number]  [id number]^NOHEMI^GISSEL     [facility],[place]  Echocardiography [facility]  [address] [place] [id number]     Name: SARAI KILLIAN  MRN: [id number]  : 1960  Study Date: 10/27/2023 08:08 AM  Age: 62  yrs  Gender: Female  Patient Location: [facility] [place]  Reason For Study: Other, Please Specify in Comments  Ordering Physician: GISSEL SONG  Performed By: Kel Cruz     BSA: 1.6 m2  Height: 63 in  Weight: 133 lb  HR: 115  BP: 116/79 mmHg  ______________________________________________________________________________  Procedure  Complete Portable Echo Adult. Contrast Optison. TDS - pt scanned sitting due  to SOB, arrhythmia. Optison (NDC #5009-7556-09) given intravenously. Patient  was given 5 ml mixture of 3 ml Optison and 6 ml saline. 4 ml wasted. The  patient's rhythm is atrial fibrillation.  ______________________________________________________________________________  Interpretation Summary  The patient's rhythm is atrial fibrillation with RVR.     The visual ejection fraction is 60-65%.  No regional wall motion abnormalities are seen.  Left ventricular size is normal.  Global right ventricular function/size appear normal (RV is more difficult to  visualize).  No significant valvular abnormalities present.  Mild tricuspid insufficiency is present.  The right ventricular systolic pressure is 45mmHg above the right atrial  pressure.  Pulmonary hypertension is present.     This study was compared with the study from 7/26/2022 .Afib RVR and pulm HTN  are new findings.  ______________________________________________________________________________  Left Ventricle  Global and regional left ventricular function is normal with an EF of 55-60%.  Left ventricular size is normal. Mild concentric wall thickening consistent  with left ventricular hypertrophy is present. The visual ejection fraction is  60-65%. Left ventricular diastolic function is not assessable. No regional  wall motion abnormalities are seen.     Right Ventricle  Global right ventricular function is normal. The right ventricle is normal  size.     Atria  Both atria appear normal.     Mitral Valve  The mitral valve is normal.     Aortic Valve  Aortic valve  sclerosis is present. On Doppler interrogation, there is no  significant stenosis or regurgitation.     Tricuspid Valve  Mild tricuspid insufficiency is present. Pulmonary hypertension is present.  The right ventricular systolic pressure is 45mmHg above the right atrial  pressure.     Pulmonic Valve  On Doppler interrogation, there is no significant stenosis or regurgitation.     Vessels  The aorta root is normal. The inferior vena cava cannot be assessed. The  thoracic aorta cannot be assessed.     Pericardium  No pericardial effusion is present.     Miscellaneous  No significant valvular abnormalities present.     Compared to Previous Study  This study was compared with the study from 2022 . Afib RVR is new.  ______________________________________________________________________________  MMode/2D Measurements & Calculations  IVSd: 1.0 cm  LVIDd: 4.6 cm  LVIDs: 3.7 cm  LVPWd: 1.2 cm  FS: 19.5 %  LV mass(C)d: 182.6 grams  LV mass(C)dI: 112.3 grams/m2  Ao root diam: 3.3 cm  asc Aorta Diam: 3.2 cm  LVOT diam: 1.9 cm  LVOT area: 2.8 cm2  Ao root diam index Ht(cm/m): 2.1  Ao root diam index BSA (cm/m2): 2.0  Asc Ao diam index BSA (cm/m2): 2.0  Asc Ao diam index Ht(cm/m): 2.0  LA Volume (BP): 53.7 ml     LA Volume Index (BP): 32.9 ml/m2  RWT: 0.51     Doppler Measurements & Calculations  MV E max herberth: 73.4 cm/sec  MV A max herberth: 68.1 cm/sec  MV E/A: 1.1  MV dec slope: 285.3 cm/sec2  MV dec time: 0.27 sec  PA acc time: 0.10 sec  TR max herberth: 336.5 cm/sec  TR max P.3 mmHg  E/E' av.7  Lateral E/e': 6.5  Medial E/e': 8.8     ______________________________________________________________________________  Report approved by: Rebecca CAMPOS 10/27/2023 10:51 AM           *Note: Due to a large number of results and/or encounters for the requested time period, some results have not been displayed. A complete set of results can be found in Results Review.       Discharge Medications   Discharge Medication List as of  11/4/2023  2:14 PM        START taking these medications    Details   amoxicillin-clavulanate (AUGMENTIN) 500-125 MG tablet Take 1 tablet by mouth every 24 hours for 2 days, Disp-2 tablet, R-0, E-PrescribeTake after dialysis on dialysis days      benzonatate (TESSALON) 100 MG capsule Take 1 capsule (100 mg) by mouth every 4 hours as needed for cough, Disp-30 capsule, R-0, E-Prescribe      levalbuterol (XOPENEX) 1.25 MG/3ML neb solution Take 3 mLs (1.25 mg) by nebulization 3 times daily, Disp-270 mL, R-0, E-Prescribe      midodrine (PROAMATINE) 10 MG tablet Take 1 tablet (10 mg) by mouth 3 times daily (with meals), Disp-90 tablet, R-0, E-Prescribe           CONTINUE these medications which have CHANGED    Details   acetylcysteine (MUCOMYST) 10 % nebulizer solution Inhale 4 mLs into the lungs 3 times daily, Disp-90 mL, R-0, E-Prescribe      azaTHIOprine (IMURAN) 50 MG tablet Take 1 tablet (50 mg) by mouth daily for 30 days, Disp-30 tablet, R-0, E-Prescribe      azithromycin (ZITHROMAX) 250 MG tablet Take 1 tablet (250 mg) by mouth daily, No Print Out      budesonide (PULMICORT) 0.5 MG/2ML neb solution Take 2 mLs (0.5 mg) by nebulization daily as needed, Disp-60 mL, R-0, E-Prescribe      predniSONE (DELTASONE) 5 MG tablet Take 2 tablets (10 mg) by mouth every morning for 2 days, THEN 1 tablet (5 mg) every morning for 28 days., Disp-32 tablet, R-0, E-Prescribe      sulfamethoxazole-trimethoprim (BACTRIM) 400-80 MG tablet Take 1 tablet by mouth three times a week MWF, No Print OutMWF      tacrolimus (GENERIC) 0.5 MG capsule Take 1 capsule (0.5 mg) by mouth 2 times daily, Disp-60 capsule, R-0, E-Prescribe      valGANciclovir (VALCYTE) 450 MG tablet Take 1 tablet (450 mg) by mouth Every Mon, Wed, Fri Morning, Disp-30 tablet, R-0, E-PrescribeAfter dialysis on dialysis days           CONTINUE these medications which have NOT CHANGED    Details   acetaminophen (TYLENOL) 325 MG tablet Take 1 tablet (325 mg) by mouth every 4  hours as needed for mild pain or fever, Disp-60 tablet, No Print Out      albuterol (PROVENTIL) (2.5 MG/3ML) 0.083% neb solution Take 1 vial (2.5 mg) by nebulization every 6 hours as needed for shortness of breath, wheezing or cough, Disp-360 mL, R-4, E-Prescribe      cholecalciferol 50 MCG (2000 UT) CAPS Take 1 capsule by mouth daily On dialysis days (MWF), Historical      fluticasone-salmeterol (ADVAIR) 250-50 MCG/ACT inhaler Inhale 1 puff into the lungs every 12 hours, Disp-60 each, R-11, E-Prescribe      Magnesium Cl-Calcium Carbonate (SLOW-MAG) 71.5-119 MG TBEC Take 2 tablets by mouth four times a week Take on non dialysis days T/Th/Sa/Su, Disp-90 tablet, R-3, No Print Out      montelukast (SINGULAIR) 10 MG tablet Take 1 tablet (10 mg) by mouth At Bedtime, Disp-30 tablet, R-11, E-Prescribe      pantoprazole (PROTONIX) 40 MG EC tablet Take 1 tablet (40 mg) by mouth every morning (before breakfast), Disp-30 tablet, R-11, E-Prescribe      posaconazole (NOXAFIL) 100 MG EC tablet Take 3 tablets (300 mg) by mouth daily, Disp-90 tablet, R-11, E-Prescribe           STOP taking these medications       calcium acetate (PHOSLO) 667 MG CAPS capsule Comments:   Reason for Stopping:         metoprolol tartrate (LOPRESSOR) 25 MG tablet Comments:   Reason for Stopping:         multivitamin RENAL (RENAVITE RX/NEPHROVITE) 1 tablet tablet Comments:   Reason for Stopping:             Allergies   Allergies   Allergen Reactions    Isopropyl Alcohol Other (See Comments)     The alcohol burns the open areas on her skin when used as a skin prep for procedures    Vancomycin Other (See Comments)     Diffuse erythroderma with itching (improved with Benadryl) after receiving vancomycin over 1 hour. 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 Can use vancomycin in the future, but please give over slower infusion time (at least 2 hours) and premedicate with Benadryl.

## 2023-11-08 NOTE — TELEPHONE ENCOUNTER
DATE:  11/8/2023     TIME OF RECEIPT FROM LAB:  1548    ORDERING PROVIDER:     LAB TEST:  Alk Phos    LAB VALUE:  203    RESULTS GIVEN WITH READ-BACK TO (PROVIDER):  CHANTE CHANEL    TIME LAB VALUE REPORTED TO PROVIDER:   1547

## 2023-11-09 NOTE — PROGRESS NOTES
The OR called and asked me to put in a case mod to update the procedure to include a laser. After I did, she realized that Dr. Lou already had taken care of it.    Patrick Mejia on 11/9/2023 at 3:28 PM

## 2023-11-10 NOTE — TELEPHONE ENCOUNTER
Tacrolimus level 7.0 at 12.25 hours, on 11/8/23.  Goal 8-10.   Current dose 0.5 mg in AM, 0.5 mg in PM    Dose changed to 0.5 mg in AM, 1 mg in PM   Recheck level in 7-10 days.    Discussed with ptAd Lin message sent

## 2023-11-13 NOTE — TELEPHONE ENCOUNTER
CMV negative x2 (10/31 and 11/5). Per Ame, reduce to maintenance dosing which is Valcyte 450 mg twice weekly (M/F after dialysis). Pt notified and agrees with plan. Further plan pending ID visit on 12/1. Refill of Bactrim sent to local pharmacy per her request.

## 2023-11-15 NOTE — NURSING NOTE
Is the patient currently in the state of [place]? YES    Visit mode:VIDEO    If the visit is dropped, the patient can be reconnected by: VIDEO VISIT: Send to e-mail at: [email or web address]    Will anyone else be joining the visit? NO  (If patient encounters technical issues they should call [phone]    How would you like to obtain your AVS? MyChart    Are changes needed to the allergy or medication list? Pt stated no changes to allergies and Pt stated no med changes    Reason for visit: GRABIEL JAVED

## 2023-11-15 NOTE — OR NURSING
RE: plan for pre-op H&P  Received: Today  Keri Hubbard, Migdalia Miller, RN; Patrick Mejia; Mati Norris MD Dr. Cho can complete DOS.          Previous Messages       ----- Message -----  From: Migdalia Chin RN  Sent: 11/15/2023   1:58 PM CST  To: Patrick Meija; Keri Hubbard RN  Subject: plan for pre-op H&P                              Hello,    Pt is scheduled for flexible bronchosocopy, tissue/tumor debulking, using CO2 laser and related procedures on 11/17/23.    Do you know what the plan is for her pre-op H&P?    Kind regards,    Migdalia Chin RN, BSN  Pre-Anesthesia Screening  [phone] Office

## 2023-11-15 NOTE — LETTER
11/15/2023       RE: Kecia Blue  [address] [place] [address]     Dear Colleague,    Thank you for referring your patient, Kecia Blue, to the [facility] INFECTIOUS DISE[facility] [place] at [facility]. Please see a copy of my visit note below.    Virtual Visit Details    Type of service:  Video Visit   Video Start Time:  2.48 pm   Video End Time: 3.02 pm     Originating Location (pt. Location): Home    Distant Location (provider location):  Off-site  Platform used for Video Visit: [employer]    Total time including chart review, care-coordination and documentation time on the date of encounter - 20 mins    _______________________________________________________________________________________________________  [facility]  Transplant Infectious Disease Progress Note     Patient:  Kecia Blue, Date of birth 1960, Medical record number [id number]  Date of Visit:  11/15/2023         Assessment and Recommendations:   Recommendations:  - no specific interventions this visit other than monitor CMV VL for 2 months after stopping valcyte   --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 This will most likely be for life   - periodic posaconazole trough levels - every 6 months   - elevated to normal alk phos (twice normal) with normal AST/ALT - could be from posa - no intervention as mild  --Cont bactrim for PCP secondary prophylaxis.This will be for life  - Check EBV VL every 6-12 months. Currently neg   - with respect to Pursuing a kidney transplant - she is at risk for worsening aspergillus infection with heightened immune suppression soon after kidney transplant especially in lieu of liver issues. However it is hard to quantify that risk. There has been no recurrent pleural effusion since 9/2021. She is on posaconazole. She is willing to take the risk. Therefore can proceed  with kidney transplant from an ID standpoint. Communicated with lung transplant team previously   - Due for covid and RSV vaccination     No follow up scheduled at this time. Patient knows to contact me if issues arise.       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 s/p drainage and vori (S to both vori and posa) in 2019, Calcified mass in left pleural cavity s/p biopsy Oct 2020 with aspergillus on cx and path (S to both vori and posa) vori changed to posaconazole. S/p left pleural effusion drainage April 2021 - exudative, fungal elements seen on KOH but fungal cx neg. Posa levels good and no recurrence of pleural effusion in Sep 2021 CT chest.     I suspect the left pleural effusion is reactive to the calcified aspergillus elements there along with fluid disturbance related to dialysis. I do not think this is failure of anti-fungal therapy or recurrence of Aspergillus infection. It seems Dr. Layton has had discussions with surgery for source control (removal of calcified mass), however, there is  concern for high morbidity with surgery and therefore it was decided to pursue long term posaconazole.     EBV viremia: elevated to 960k in 10/2021. Suspect due to recent hospitalization, health stress. Decreased to 135k 2/2022. Neg 6/27/22. Can check every 6-12 months.     Candidacy for kidney transplant: This is a very difficult  call. She is at risk for aspergillus infection worsening with heightened immune suppression soon after kidney transplant but its hard to know how much of a risk this will be. Also people with liver disease have worse fungal infection severity. Patient is ok with taking this risk. Ok to proceed with transplant from an ID standpoint.                 Interval History:   I last saw her 8/2022  Low grade CMV viremia since 8/2023, not viremic anymore, plan for Valcyte for 2 more weeks and plan to stop   Recent admission Oct/Nov 2023 for post obstructive pneumonia which was treated with Augmentin, finished 11/8/23     Has Right bronch stenosis, gets balloon angioplasty, there is plan for laser   CT chest is planned for next week   Got flu shot, hoping to get covid and rsv     Continues to be on posaconazole 300 mg po daily, tolerating it well.   posa level 5/26 = 2 - most likely trough. Alk phos is normal now   On Bactrim three times a week prophylaxis -  CBC ok    EBV neg 6/27/22    She feels well, no more breathing issues.            Current Medications & Allergies:   Reviewed    Allergies   Allergen Reactions    Isopropyl Alcohol Other (See Comments)     The alcohol burns the open areas on her skin when used as a skin prep for procedures    Vancomycin Other (See Comments)     Diffuse erythroderma with itching (improved with Benadryl) after receiving vancomycin over 1 hour. 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 Can use vancomycin in the future, but please give over slower infusion time (at least 2 hours) and premedicate with Benadryl.            Physical Exam:     Unable to examine due to virtual visit          Laboratory Data:     Inflammatory Markers    Recent Labs   Lab Test 10/29/23  0642 10/28/23  0725 10/07/19  1221 10/06/19  1444 09/13/19  0934 09/11/19  2325 08/22/19  0820   SED 66* 58* 93*  --  111* 102*  --    CRP  --   --   --  25.0*  58.0* 66.0* 47.0*       Immune Globulin Studies     Recent Labs   Lab Test 10/27/23  0701 10/24/23  1033 09/15/21  0915 01/31/21  0441 01/25/21  0406 10/27/19  0621 03/01/18  0356 02/19/18  0759     --  1,198 763  --  936 698 1,790*   IGM  --   --   --   --   --   --   --  502*   IGE  --  <2  --   --  <2  --   --  <2   IGA  --   --   --   --   --   --   --  425*   IGG1  --   --   --   --   --   --   --  1,300*   IGG2  --   --   --   --   --   --   --  131*   IGG3  --   --   --   --   --   --   --  101   IGG4  --   --   --   --   --   --   --  1*       Metabolic Studies       Recent Labs   Lab Test 11/08/23  0846 11/04/23  0653 11/03/23  0556 11/02/23  0734 11/01/23  0722 10/31/23  0606 10/30/23  1706 10/28/23  1641 10/28/23  0914 10/28/23  0725 10/27/23  0701 10/26/23  0054 10/25/23  1356 06/27/22  1013 05/26/22  0750 10/22/19  1314 10/21/19  1752   NA  --  138 137   < > 139   < > 136   < >  --    < > 133*   < > 133*   < > 137   < > 133   POTASSIUM  --  3.8 3.6   < > 3.2*   < > 3.4   < >  --    < > 3.5   < > 4.4   < > 3.5   < > 5.0   CHLORIDE 101 103 103   < > 101   < > 99   < >  --    < > 95*   < > 96*   < > 96   < > 109   CO2  --  25 23   < > 26   < > 25   < >  --    < > 26   < > 26   < > 32   < > 10*   ANIONGAP  --  10 11   < > 12   < > 12   < >  --    < > 12   < > 11   < > 9   < > 13   BUN  --  14.1 24.6*   < > 19.0   < > 11.0   < >  --    < > 16.0   < > 24.6*   < > 42*   < > 66*   CR  --  2.86* 3.68*   < > 2.72*   < > 1.94*   < >  --    < > 3.00*   < > 4.90*   < > 3.98*   < > 5.76*   GFRESTIMATED  --  18* 13*   < > 19*   < > 29*   < >  --    < > 17*   < > 10*   < > 12*   < > 8*   GLC  --  114* 144*   < > 90   < > 134*   < >  --    < > 100*   < > 92   < > 110*   < > 138*   A1C  --   --   --   --   --   --   --   --   --   --   --   --   --   --  5.2   < >  --    CHELSEY  --  8.8 8.2*   < > 8.6*   < > 7.9*   < >  --    < > 8.9   < > 8.3*   < > 9.5   < > 8.0*   PHOS  --  3.0 3.8   < > 2.6   < >  --    < >  --     < > 2.3*   < >  --   --   --    < > 6.7*   MAG  --   --   --   --  1.5*  --   --   --   --   --   --    < >  --    < > 1.8   < > 2.0   LACT  --   --   --   --   --   --  0.9  --  0.7  --   --    < > 1.1   < >  --    < >  --    PCAL  --   --   --   --   --   --   --   --   --   --   --   --  0.70*  --   --    < >  --    FGTL  --   --   --   --   --   --   --   --   --   --  35  --   --   --   --    < >  --    CKT  --   --   --   --   --   --   --   --   --   --  69  --   --   --   --   --  70    < > = values in this interval not displayed.       Hepatic Studies    Recent Labs   Lab Test 11/04/23  0653 11/03/23  0556 11/02/23  0734 10/25/23  1356 10/24/23  1033 09/15/21  0915 05/01/21  0654 01/27/21  0414 01/26/21  0425   BILITOTAL 0.5 0.5 0.6   < > 0.9   < >  --    < > 0.8   DBIL  --   --   --   --  0.50*   < >  --    < > 0.6*   ALKPHOS 179* 140* 153*   < > 306*   < >  --    < > 184*   PROTTOTAL 6.3* 5.9* 5.8*   < > 7.4   < > 7.2   < > 6.0*  6.0*   ALBUMIN 3.2* 3.1* 3.1*   < > 3.6   < >  --    < > 2.0*   AST 22 14 18   < > 39   < >  --    < > 11   ALT 24 20 20   < > 31   < >  --    < > 30   LDH  --   --   --   --   --   --  164  --  187    < > = values in this interval not displayed.     Hematology Studies      Recent Labs   Lab Test 11/04/23  0653 11/03/23  0556 11/02/23  0734 11/01/23  0722 10/31/23  0606 10/30/23  1706   WBC 11.9* 8.8 5.7 1.6*  1.6* 2.1*  2.1* 3.1*   ANEU 10.7* 7.7 5.0 1.0*  --   --    ALYM 0.6* 0.4* 0.4* 0.4*  --   --    SHERRI 0.2 0.3 0.2 0.2  --   --    AEOS 0.1 0.2 0.0 0.1  --   --    HGB 8.5* 7.9* 7.8* 8.0* 7.6* 7.9*   HCT 26.8* 24.8* 24.8* 25.3* 23.8* 24.6*    204 153 143* 138* 152       Microbiology:  5/2021 pleural fluid KOH - pos fungal elements, fungal cx neg    10/2019 pleural fluid fungal cx   Aspergillus fumigatus    E-TEST   Amphotericin B 0.5 ug/mL No interpretation available    Comment: See comment... 1     Itraconazole 0.12 ug/mL No interpretation available    Micafungin  <=0.06 ug/mL No interpretation available    Posaconazole <=0.06 ug/ml No interpretation available    Voriconazole 0.5 ug/mL No interpretation available      Lung tissue Fungal cx aug 2020    Aspergillus fumigatus     FUNGAL YEAST JAYME     Amphotericin B 1.0 ug/mL No interpretation available     Comment: See comment... 1      Itraconazole 0.25 ug/mL No interpretation available     Micafungin <=0.03 ug/mL No interpretation available     Posaconazole 0.12 ug/ml No interpretation available     Voriconazole 0.5 ug/mL No interpretation available      Imaging: personally reviewed   CT chest 11/21/23  1. Increasing mixed groundglass and interstitial opacities and focal  consolidation in the right lower lobe, and to a lesser extent the  right upper lobe and left lower lobe. These findings may represent  infection (viral, PJP), recurrence of interstitial lung disease,  restrictive allograft syndrome/CLAD.  2. Chronic appearing pulmonary embolism with calcification in the  right lower lobe pulmonary artery. PA enlargement suggestive of  pulmonary hypertension possibly CTEPH .  3. Upper left anterior chest wall and breast subcutaneous edema and  skin thickening. Nonspecific.  4. Stenosis of the bronchus intermedius.  5. Loculated anterior superior left pleural effusion. Chronic pleural  calcification left base secondary to prior empyema.  6. Gallbladder wall thickening and cholelithiasis may represent  chronic cholecystitis.     CXR 2/10/22  IMPRESSION:  1. Decreased retrocardiac opacities, likely representing  fibroatelectasis. Cannot exclude infectious component.  2. No new focal opacities, pleural effusion, or pneumothorax.  3. Mild bowel loop dilation in the limited upper abdomen.    CT chest 9/15/21  IMPRESSION: Small old partially calcified empyema paravertebral left  lower lung. Removal of previous thoracostomy tube. Unchanged  osteopenic T5 compression deformity. Bilateral lung transplantation.  Increased opacities in  right lower lung which may indicate worsening  inflammation rather than edema. Continued right-sided bronchial  anastomotic narrowing, unchanged from just over 4 months ago.  Cholelithiasis. Aortic atherosclerosis.    CT chest 5/4/21  1. Decreased size of the peripherally calcified chronic empyema within  the medial left lower lobe with left sided chest tube in place.  Retained amphotericin bead is within the collection.  2. Cardiomegaly with basilar predominance groundglass opacities and  interlobular septal thickening, compatible with pulmonary edema.  3. Postsurgical changes of bilateral lung transplant with unchanged  mild right bronchial anastomotic narrowing.    CT chest 4/8/21  1. Stable partially calcified pleural cystic structure/collection  along the medial right lower lobe, consistent with chronic  empyema/sequela of prior infection. Trace bilateral pleural effusions.  2. Cardiomegaly with basilar predominant patchy groundglass opacities  with diffuse interlobular septal thickening, consistent with pulmonary  edema.  3. Tree-in-bud nodular opacities predominantly involving the lingula,  concerning for infection.  4. Stable postsurgical changes of bilateral lung transplantation.  5. Cholelithiasis.  6. Splenomegaly.       Julia Espinoza MD

## 2023-11-15 NOTE — PROGRESS NOTES
Virtual Visit Details    Type of service:  Video Visit   Video Start Time:  2.48 pm   Video End Time: 3.02 pm     Originating Location (pt. Location): Home    Distant Location (provider location):  Off-site  Platform used for Video Visit: Wirama    Total time including chart review, care-coordination and documentation time on the date of encounter - 20 mins    _______________________________________________________________________________________________________  [facility]  Transplant Infectious Disease Progress Note     Patient:  Kecia Blue, Date of birth 1960, Medical record number [id number]  Date of Visit:  11/15/2023         Assessment and Recommendations:   Recommendations:  - no specific interventions this visit other than monitor CMV VL for 2 months after stopping valcyte   --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 This will most likely be for life   - periodic posaconazole trough levels - every 6 months   - elevated to normal alk phos (twice normal) with normal AST/ALT - could be from posa - no intervention as mild  --Cont bactrim for PCP secondary prophylaxis.This will be for life  - Check EBV VL every 6-12 months. Currently neg   - with respect to Pursuing a kidney transplant - she is at risk for worsening aspergillus infection with heightened immune suppression soon after kidney transplant especially in lieu of liver issues. However it is hard to quantify that risk. There has been no recurrent pleural effusion since 9/2021. She is on posaconazole. She is willing to take the risk. Therefore can proceed with kidney transplant from an ID standpoint. Communicated with lung transplant team previously   - Due for covid and RSV vaccination     No follow up scheduled at this time. Patient knows to contact me if issues arise.       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 s/p drainage and vori (S to both vori and posa) in 2019, Calcified mass in left pleural cavity s/p biopsy Oct 2020 with aspergillus on cx and path (S to both vori and posa) vori changed to posaconazole. S/p left pleural effusion drainage April 2021 - exudative, fungal elements seen on KOH but fungal cx neg. Posa levels good and no recurrence of pleural effusion in Sep 2021 CT chest.     I suspect the left pleural effusion is reactive to the calcified aspergillus elements there along with fluid disturbance related to dialysis. I do not think this is failure of anti-fungal therapy or recurrence of Aspergillus infection. It seems Dr. Layton has had discussions with surgery for source control (removal of calcified mass), however, there is  concern for high morbidity with surgery and therefore it was decided to pursue long term posaconazole.     EBV viremia: elevated to 960k in 10/2021. Suspect due to recent hospitalization, health stress. Decreased to 135k 2/2022. Neg 6/27/22. Can check every 6-12 months.     Candidacy for kidney transplant: This is a very difficult call. She is at risk for aspergillus infection worsening with heightened immune suppression soon after kidney transplant but its hard to know how much of a risk this will be. Also people with liver disease have worse fungal infection severity. Patient is ok with taking this risk. Ok to proceed with transplant from an ID standpoint.                  Interval History:   I last saw her 8/2022  Low grade CMV viremia since 8/2023, not viremic anymore, plan for Valcyte for 2 more weeks and plan to stop   Recent admission Oct/Nov 2023 for post obstructive pneumonia which was treated with Augmentin, finished 11/8/23     Has Right bronch stenosis, gets balloon angioplasty, there is plan for laser   CT chest is planned for next week   Got flu shot, hoping to get covid and rsv     Continues to be on posaconazole 300 mg po daily, tolerating it well.   posa level 5/26 = 2 - most likely trough. Alk phos is normal now   On Bactrim three times a week prophylaxis -  CBC ok    EBV neg 6/27/22    She feels well, no more breathing issues.            Current Medications & Allergies:   Reviewed    Allergies   Allergen Reactions    Isopropyl Alcohol Other (See Comments)     The alcohol burns the open areas on her skin when used as a skin prep for procedures    Vancomycin Other (See Comments)     Diffuse erythroderma with itching (improved with Benadryl) after receiving vancomycin over 1 hour. 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 Can use vancomycin in the future, but please give over slower infusion time (at least 2 hours) and premedicate with Benadryl.            Physical Exam:     Unable to examine due to virtual visit          Laboratory Data:     Inflammatory Markers    Recent Labs   Lab Test 10/29/23  0642 10/28/23  0725 10/07/19  1221 10/06/19  1444 09/13/19  0934 09/11/19  2325 08/22/19  0820   SED 66* 58* 93*  --  111* 102*  --    CRP  --   --   --  25.0* 58.0* 66.0* 47.0*       Immune Globulin Studies     Recent Labs   Lab Test 10/27/23  0701 10/24/23  1033 09/15/21  0915 01/31/21  0441 01/25/21  0406 10/27/19  0621 03/01/18  0356 02/19/18  0759     --  1,198 763  --  936 698 1,790*   IGM  --   --   --   --   --   --   --  502*   IGE  --  <2  --   --  <2  --   --  <2   IGA  --    --   --   --   --   --   --  425*   IGG1  --   --   --   --   --   --   --  1,300*   IGG2  --   --   --   --   --   --   --  131*   IGG3  --   --   --   --   --   --   --  101   IGG4  --   --   --   --   --   --   --  1*       Metabolic Studies       Recent Labs   Lab Test 11/08/23  0846 11/04/23  0653 11/03/23  0556 11/02/23  0734 11/01/23  0722 10/31/23  0606 10/30/23  1706 10/28/23  1641 10/28/23  0914 10/28/23  0725 10/27/23  0701 10/26/23  0054 10/25/23  1356 06/27/22  1013 05/26/22  0750 10/22/19  1314 10/21/19  1752   NA  --  138 137   < > 139   < > 136   < >  --    < > 133*   < > 133*   < > 137   < > 133   POTASSIUM  --  3.8 3.6   < > 3.2*   < > 3.4   < >  --    < > 3.5   < > 4.4   < > 3.5   < > 5.0   CHLORIDE 101 103 103   < > 101   < > 99   < >  --    < > 95*   < > 96*   < > 96   < > 109   CO2  --  25 23   < > 26   < > 25   < >  --    < > 26   < > 26   < > 32   < > 10*   ANIONGAP  --  10 11   < > 12   < > 12   < >  --    < > 12   < > 11   < > 9   < > 13   BUN  --  14.1 24.6*   < > 19.0   < > 11.0   < >  --    < > 16.0   < > 24.6*   < > 42*   < > 66*   CR  --  2.86* 3.68*   < > 2.72*   < > 1.94*   < >  --    < > 3.00*   < > 4.90*   < > 3.98*   < > 5.76*   GFRESTIMATED  --  18* 13*   < > 19*   < > 29*   < >  --    < > 17*   < > 10*   < > 12*   < > 8*   GLC  --  114* 144*   < > 90   < > 134*   < >  --    < > 100*   < > 92   < > 110*   < > 138*   A1C  --   --   --   --   --   --   --   --   --   --   --   --   --   --  5.2   < >  --    CHELSEY  --  8.8 8.2*   < > 8.6*   < > 7.9*   < >  --    < > 8.9   < > 8.3*   < > 9.5   < > 8.0*   PHOS  --  3.0 3.8   < > 2.6   < >  --    < >  --    < > 2.3*   < >  --   --   --    < > 6.7*   MAG  --   --   --   --  1.5*  --   --   --   --   --   --    < >  --    < > 1.8   < > 2.0   LACT  --   --   --   --   --   --  0.9  --  0.7  --   --    < > 1.1   < >  --    < >  --    PCAL  --   --   --   --   --   --   --   --   --   --   --   --  0.70*  --   --    < >  --    FGTL  --   --    --   --   --   --   --   --   --   --  35  --   --   --   --    < >  --    CKT  --   --   --   --   --   --   --   --   --   --  69  --   --   --   --   --  70    < > = values in this interval not displayed.       Hepatic Studies    Recent Labs   Lab Test 11/04/23  0653 11/03/23  0556 11/02/23  0734 10/25/23  1356 10/24/23  1033 09/15/21  0915 05/01/21  0654 01/27/21  0414 01/26/21  0425   BILITOTAL 0.5 0.5 0.6   < > 0.9   < >  --    < > 0.8   DBIL  --   --   --   --  0.50*   < >  --    < > 0.6*   ALKPHOS 179* 140* 153*   < > 306*   < >  --    < > 184*   PROTTOTAL 6.3* 5.9* 5.8*   < > 7.4   < > 7.2   < > 6.0*  6.0*   ALBUMIN 3.2* 3.1* 3.1*   < > 3.6   < >  --    < > 2.0*   AST 22 14 18   < > 39   < >  --    < > 11   ALT 24 20 20   < > 31   < >  --    < > 30   LDH  --   --   --   --   --   --  164  --  187    < > = values in this interval not displayed.     Hematology Studies      Recent Labs   Lab Test 11/04/23  0653 11/03/23  0556 11/02/23  0734 11/01/23  0722 10/31/23  0606 10/30/23  1706   WBC 11.9* 8.8 5.7 1.6*  1.6* 2.1*  2.1* 3.1*   ANEU 10.7* 7.7 5.0 1.0*  --   --    ALYM 0.6* 0.4* 0.4* 0.4*  --   --    SHERIR 0.2 0.3 0.2 0.2  --   --    AEOS 0.1 0.2 0.0 0.1  --   --    HGB 8.5* 7.9* 7.8* 8.0* 7.6* 7.9*   HCT 26.8* 24.8* 24.8* 25.3* 23.8* 24.6*    204 153 143* 138* 152       Microbiology:  5/2021 pleural fluid KOH - pos fungal elements, fungal cx neg    10/2019 pleural fluid fungal cx   Aspergillus fumigatus    E-TEST   Amphotericin B 0.5 ug/mL No interpretation available    Comment: See comment... 1     Itraconazole 0.12 ug/mL No interpretation available    Micafungin <=0.06 ug/mL No interpretation available    Posaconazole <=0.06 ug/ml No interpretation available    Voriconazole 0.5 ug/mL No interpretation available      Lung tissue Fungal cx aug 2020    Aspergillus fumigatus     FUNGAL YEAST JAYME     Amphotericin B 1.0 ug/mL No interpretation available     Comment: See comment... 1      Itraconazole  0.25 ug/mL No interpretation available     Micafungin <=0.03 ug/mL No interpretation available     Posaconazole 0.12 ug/ml No interpretation available     Voriconazole 0.5 ug/mL No interpretation available      Imaging: personally reviewed   CT chest 11/21/23  1. Increasing mixed groundglass and interstitial opacities and focal  consolidation in the right lower lobe, and to a lesser extent the  right upper lobe and left lower lobe. These findings may represent  infection (viral, PJP), recurrence of interstitial lung disease,  restrictive allograft syndrome/CLAD.  2. Chronic appearing pulmonary embolism with calcification in the  right lower lobe pulmonary artery. PA enlargement suggestive of  pulmonary hypertension possibly CTEPH .  3. Upper left anterior chest wall and breast subcutaneous edema and  skin thickening. Nonspecific.  4. Stenosis of the bronchus intermedius.  5. Loculated anterior superior left pleural effusion. Chronic pleural  calcification left base secondary to prior empyema.  6. Gallbladder wall thickening and cholelithiasis may represent  chronic cholecystitis.     CXR 2/10/22  IMPRESSION:  1. Decreased retrocardiac opacities, likely representing  fibroatelectasis. Cannot exclude infectious component.  2. No new focal opacities, pleural effusion, or pneumothorax.  3. Mild bowel loop dilation in the limited upper abdomen.    CT chest 9/15/21  IMPRESSION: Small old partially calcified empyema paravertebral left  lower lung. Removal of previous thoracostomy tube. Unchanged  osteopenic T5 compression deformity. Bilateral lung transplantation.  Increased opacities in right lower lung which may indicate worsening  inflammation rather than edema. Continued right-sided bronchial  anastomotic narrowing, unchanged from just over 4 months ago.  Cholelithiasis. Aortic atherosclerosis.    CT chest 5/4/21  1. Decreased size of the peripherally calcified chronic empyema within  the medial left lower lobe with  left sided chest tube in place.  Retained amphotericin bead is within the collection.  2. Cardiomegaly with basilar predominance groundglass opacities and  interlobular septal thickening, compatible with pulmonary edema.  3. Postsurgical changes of bilateral lung transplant with unchanged  mild right bronchial anastomotic narrowing.    CT chest 4/8/21  1. Stable partially calcified pleural cystic structure/collection  along the medial right lower lobe, consistent with chronic  empyema/sequela of prior infection. Trace bilateral pleural effusions.  2. Cardiomegaly with basilar predominant patchy groundglass opacities  with diffuse interlobular septal thickening, consistent with pulmonary  edema.  3. Tree-in-bud nodular opacities predominantly involving the lingula,  concerning for infection.  4. Stable postsurgical changes of bilateral lung transplantation.  5. Cholelithiasis.  6. Splenomegaly.

## 2023-11-16 NOTE — TELEPHONE ENCOUNTER
Via phone patient will call to schedule if she can't get an appointment closer to her home for the following    Appointment type: HFU  Provider: Dr. Khan  Return date:   Specialty phone number: [phone]

## 2023-11-17 NOTE — ANESTHESIA PROCEDURE NOTES
Airway       Patient location during procedure: OR       Procedure Start/Stop Times: 11/17/2023 3:21 PM  Staff -        Anesthesiologist:  Farhad Freeman MD       CRNA: Solo Marroquin APRN CRNA       Other Anesthesia Staff: Salome Peralta       Performed By: SRNA  Consent for Airway        Urgency: elective  Indications and Patient Condition       Indications for airway management: georges-procedural       Induction type:intravenous       Mask difficulty assessment: 0 - not attempted    Final Airway Details       Final airway type: endotracheal airway       Successful airway: ETT - single  Endotracheal Airway Details        ETT size (mm): 8.5       Cuffed: yes       Successful intubation technique: video laryngoscopy       VL Blade Size: Glidescope 3       Grade View of Cords: 1       Adjucts: stylet       Position: Right       Measured from: gums/teeth       Secured at (cm): 23       Bite block used: None    Post intubation assessment        Placement verified by: capnometry, equal breath sounds and chest rise        Number of attempts at approach: 1       Number of other approaches attempted: 0       Secured with: tape       Ease of procedure: easy       Dentition: Intact and Unchanged    Medication(s) Administered   Medication Administration Time: 11/17/2023 3:21 PM

## 2023-11-17 NOTE — PROCEDURES
INTERVENTIONAL PULMONOLOGY       Procedure(s):    A flexible and rigid bronchoscopy   Airway exam  Balloon bronchoplasty without stent placement (1 sites)   Therapeutic suctioning (3 sites)  Tissue/tumor debulking     Indication:  lung transplant    Attending of Record:  Mati Norris MD     Interventional Pulmonary Fellow   None    Trainees Present:   None     Medications:    General Anesthesia - See anesthesia flowsheet for details    Sedation Time:   Per Anesthesia Care Provider    Time Out:  Performed    The patient's medical record has been reviewed.  The indication for the procedure was reviewed.  The necessary history and physical examination was performed and reviewed.  The risks, benefits and alternatives of the procedure were discussed with the the patient in detail and she had the opportunity to ask questions.  I discussed in particular the potential complications including risks of minor or life-threatening bleeding and/or infection, respiratory failure, vocal cord trauma / paralysis, pneumothorax, and discomfort. Sedation risks were also discussed including abnormal heart rhythms, low blood pressure, and respiratory failure. All questions were answered to the best of my ability.  Verbal and written informed consent was obtained.  The proposed procedure and the patient's identification were verified prior to the procedure by the physician and the surgical team.    The patient was assessed for the adequacy for the procedure and to receive medications.   Mental Status:  Alert and oriented x 3  Airway examination:  Class I (Complete visualization of soft palate)  Pulmonary:  Clear to ausculation bilaterally  CV:  RRR, no murmurs or gallops  ASA Grade:  (I)  Normal healthy patient    After clinical evaluation and reviewing the indication, risks, alternatives and benefits of the procedure the patient was deemed to be in satisfactory condition to undergo the procedure.           A Tuberculosis risk  assessment was performed:  The patient has no known RISK of Tuberculosis    The procedure was performed in a negative airflow room: The patient could not be moved to a negative airflow room because of needed OR for the procedure    Maneuvers / Procedure:      A Flexible  bronchoscope was used for the procedure. The rigid bronchoscope was inserted into the mouth. Uvula, epiglottis and vocal cords were seen. The scope was advanced turning the bevel to 90 degress while passing through the cords and into the trachea.   Airway Examination: A complete airway examination was performed from the distal trachea to the subsegmental level in each lobe of both lungs.  Pertinent findings include RM stenosis seen.       Therapeutic suctioning: 15-20min of operative time was spent clearing out the airway of debris, blood and mucous prior to the intervention.   Tumor/Tissue Debulking: scar/granulation tissue incised using CO2 laser then dilated using elation balloon inflated to 12mm for 60sec x3. There was bleeding following mitomcyin C application and APC was performed. Hemostasis was achieved.     Relevant Pictures  See media     Recommendations:     -->  Successful flex bronch, tissue debulking with dilation and CO2 laser assistance, therapeutic suctioning and APC   -->  Follow-up bronch in o      Mati Norris MD, MHA  Associate Professor of Medicine  Section of Interventional Pulmonology   Division of Pulmonary, Allergy, Critical Care and Sleep Medicine   [facility]  Pager: [phone]   Office: [phone]  Email: [email or web address]    Keri Hubbard RN   Interventional Pulmonary Care Coordinator   Office: [phone]  Email: [email or web address]     Patrick Mejia  Interventional Pulmonary Surgery Scheduler   Office: [phone]  Email: [email or web address]

## 2023-11-17 NOTE — ANESTHESIA POSTPROCEDURE EVALUATION
Patient: Kecia Blue    Procedure: Procedure(s):  flexible bronchosocopy, tissue/tumor debulking, using CO2 laser, mitomycin application and argon plasma coagulation       Anesthesia Type:  General    Note:  Disposition: Outpatient   Postop Pain Control: Uneventful            Sign Out: Well controlled pain   PONV: No   Neuro/Psych: Uneventful            Sign Out: Acceptable/Baseline neuro status   Airway/Respiratory: Uneventful            Sign Out: Acceptable/Baseline resp. status   CV/Hemodynamics: Uneventful            Sign Out: Acceptable CV status; No obvious hypovolemia; No obvious fluid overload   Other NRE: NONE   DID A NON-ROUTINE EVENT OCCUR? No           Last vitals:  Vitals Value Taken Time   /77 11/17/23 1559   Temp     Pulse 86 11/17/23 1605   Resp     SpO2 95 % 11/17/23 1605   Vitals shown include unfiled device data.    Electronically Signed By: Farhad Freeman MD  November 17, 2023  4:06 PM

## 2023-11-17 NOTE — ANESTHESIA CARE TRANSFER NOTE
Patient: eKcia Blue    Procedure: Procedure(s):  flexible bronchosocopy, tissue/tumor debulking, using CO2 laser, mitomycin application and argon plasma coagulation       Diagnosis: Lung replaced by transplant (H) [Z94.2]  Diagnosis Additional Information: No value filed.    Anesthesia Type:   General     Note:    Oropharynx: oropharynx clear of all foreign objects and spontaneously breathing  Level of Consciousness: awake  Oxygen Supplementation: nasal cannula  Level of Supplemental Oxygen (L/min / FiO2): 3  Independent Airway: airway patency satisfactory and stable  Dentition: dentition unchanged  Vital Signs Stable: post-procedure vital signs reviewed and stable  Report to RN Given: handoff report given  Patient transferred to: PACU    Handoff Report: Identifed the Patient, Identified the Reponsible Provider, Reviewed the pertinent medical history, Discussed the surgical course, Reviewed Intra-OP anesthesia mangement and issues during anesthesia, Set expectations for post-procedure period and Allowed opportunity for questions and acknowledgement of understanding    Vitals:  Vitals Value Taken Time   /77 11/17/23 1559   Temp     Pulse 89 11/17/23 1601   Resp     SpO2 99 % 11/17/23 1601   Vitals shown include unfiled device data.    Electronically Signed By: ADRIÁN Dick CRNA  November 17, 2023  4:02 PM

## 2023-11-17 NOTE — ANESTHESIA PREPROCEDURE EVALUATION
Anesthesia Pre-Procedure Evaluation    Patient: Kecia Blue   MRN: [id number] : 1960        Preoperative Diagnosis: Lung replaced by transplant (H) [Z94.2]    Procedure :   flexible bronchosocopy, tissue/tumor debulking, using CO2 laser and related procedures (Bronchus)      Past Medical History:   Diagnosis Date    Acute on chronic respiratory failure with hypoxia (H) 2018    Anisocoria     Antisynthetase syndrome (H24) 2014    Anxiety     Aspergillus (H)     Aspergillus pneumonia (H) 2020    Benign essential hypertension     C. difficile colitis     Cytomegalovirus (CMV) viremia (H)     Dermatomyositis (H)     Dysplasia of cervix, low grade (ESTRADA 1)     EBV (Franklin-Barr virus) viremia     ESRD (end stage renal disease) on dialysis (H)     ILD (interstitial lung disease) (H)     Lung replaced by transplant (H)     Osteopenia     Paroxysmal atrial fibrillation (H)     Pneumocystis jiroveci pneumonia (H)     PONV (postoperative nausea and vomiting)     Post-menopause     Pulmonary hypertension (H)     Raynaud's disease     Seronegative rheumatoid arthritis (H)       Past Surgical History:   Procedure Laterality Date    BRONCHOSCOPY (RIGID OR FLEXIBLE), DIAGNOSTIC N/A 4/10/2018    Procedure: COMBINED BRONCHOSCOPY (RIGID OR FLEXIBLE), LAVAGE;;  Surgeon: Mariposa Donohue MD;  Location: [facility]    BRONCHOSCOPY (RIGID OR FLEXIBLE), DIAGNOSTIC N/A 2020    Procedure: BRONCHOSCOPY, WITH BRONCHOALVEOLAR LAVAGE;  Surgeon: Uri Bass MD;  Location: [facility]    BRONCHOSCOPY (RIGID OR FLEXIBLE), DIAGNOSTIC N/A 2022    Procedure: BRONCHOSCOPY, WITH BRONCHOALVEOLAR LAVAGE;  Surgeon: Uri Bass MD;  Location: [facility]    BRONCHOSCOPY (RIGID OR FLEXIBLE), DIAGNOSTIC N/A 2023    Procedure: BRONCHOSCOPY, WITH BRONCHOALVEOLAR LAVAGE;  Surgeon: Esau Bruno MD;  Location: [facility]    BRONCHOSCOPY (RIGID OR FLEXIBLE), DIAGNOSTIC N/A 2023    Procedure: BRONCHOSCOPY, WITH  BRONCHOALVEOLAR LAVAGE;  Surgeon: Beto Magaña DO;  Location: [facility]    BRONCHOSCOPY (RIGID OR FLEXIBLE), DILATE BRONCHUS / TRACHEA N/A 10/11/2018    Procedure: BRONCHOSCOPY (RIGID OR FLEXIBLE), DILATE BRONCHUS / TRACHEA;  Flexible And Rigid Bronchoscopy and Dilation;  Surgeon: Wilber Lin MD;  [facility]    BRONCHOSCOPY (RIGID OR FLEXIBLE), DILATE BRONCHUS / TRACHEA N/A 11/1/2023    Procedure: Bronchoscopy (Rigid Or Flexible), Dilate Bronchus / Trachea;  Surgeon: Beto Magaña DO;  Location: [facility]    BRONCHOSCOPY FLEXIBLE N/A 3/13/2018    Procedure: BRONCHOSCOPY FLEXIBLE;  Flexible Bronchoscopy ;  Surgeon: Gissell Sanchez MD;  Location: [facility]    BRONCHOSCOPY FLEXIBLE N/A 5/9/2018    Procedure: BRONCHOSCOPY FLEXIBLE;;  Surgeon: Wilber Lin MD;  Location: [facility]    BRONCHOSCOPY FLEXIBLE AND RIGID N/A 9/10/2018    Procedure: BRONCHOSCOPY FLEXIBLE AND RIGID;  Flexible and Rigid Bronchoscopy with Balloon Dilation, tissue debulking with cryo, and Right mainstem bronchus stent placement;  Surgeon: Wilber Lin MD;  [facility]    BRONCHOSCOPY RIGID N/A 6/6/2018    Procedure: BRONCHOSCOPY RIGID;;  Surgeon: Lopez Macias MD;  Location: [facility]    BRONCHOSCOPY, DILATE BRONCHUS, STENT BRONCHUS, COMBINED N/A 6/11/2018    Procedure: COMBINED BRONCHOSCOPY, DILATE BRONCHUS, STENT BRONCHUS;  Flexible Bronchoscopy, Balloon Dilation, Bronchial Washings;  Surgeon: Wilber Lin MD;  [facility]    BRONCHOSCOPY, DILATE BRONCHUS, STENT BRONCHUS, COMBINED Right 7/10/2018    Procedure: COMBINED BRONCHOSCOPY, DILATE BRONCHUS, STENT BRONCHUS;  Flexible Bronchoscopy, Balloon Dilation, Bronchial Washings  ;  Surgeon: Wilber Lin MD;  [facility]    BRONCHOSCOPY, DILATE BRONCHUS, STENT BRONCHUS, COMBINED N/A 8/2/2018    Procedure: COMBINED BRONCHOSCOPY, DILATE BRONCHUS, STENT BRONCHUS;  Flexible Bronchoscopy, Bronchial Washings, Balloon Dilation;   Surgeon: Wilber Lin MD;  [facility]    BRONCHOSCOPY, DILATE BRONCHUS, STENT BRONCHUS, COMBINED N/A 8/20/2018    Procedure: COMBINED BRONCHOSCOPY, DILATE BRONCHUS, STENT BRONCHUS;  Flexible Bronchoscopy, Balloon Dilation;  Surgeon: Wilber Lin MD;  [facility]    BRONCHOSCOPY, DILATE BRONCHUS, STENT BRONCHUS, COMBINED N/A 10/29/2018    Procedure: Flexible Bronchoscopy, Balloon Dilation, Stent Revision, Airway Examination And Therapeutic Suctioning, Cyro Tumor Debulking;  Surgeon: Wilber Lin MD;  [facility]    BRONCHOSCOPY, DILATE BRONCHUS, STENT BRONCHUS, COMBINED N/A 11/20/2018    Procedure: Rigid Bronchoscopy, Stent Removal and dilitation;  Surgeon: Wilber Lin MD;  [facility]    BRONCHOSCOPY, DILATE BRONCHUS, STENT BRONCHUS, COMBINED N/A 12/14/2018    Procedure: Flexible And Rigid Bronchoscopy, Balloon Dilation, Bronchial Washing;  Surgeon: Wilber Lin MD;  [facility]    BRONCHOSCOPY, DILATE BRONCHUS, STENT BRONCHUS, COMBINED N/A 1/17/2019    Procedure: Flexible And Rigid Bronchoscopy, Balloon Dilation.;  Surgeon: Wilber Lin MD;  [facility]    BRONCHOSCOPY, DILATE BRONCHUS, STENT BRONCHUS, COMBINED N/A 3/7/2019    Procedure: Flexible and Rigid Bronchoscopy, Bronchial Washing, Balloon Dilation;  Surgeon: Wilber Lin MD;  [facility]    BRONCHOSCOPY, DILATE BRONCHUS, STENT BRONCHUS, COMBINED N/A 6/6/2019    Procedure: Rigid and Flexible Bronchoscopy, Balloon Dilation;  Surgeon: Wilber Lin MD;  [facility]    BRONCHOSCOPY, DILATE BRONCHUS, STENT BRONCHUS, COMBINED N/A 10/11/2019    Procedure: Flexible and Rigid Bronchoscopy, Balloon Dilation, Bronchoalveolar Lagave;  Surgeon: Wilber Lin MD;  [facility]    BRONCHOSCOPY, DILATE BRONCHUS, STENT BRONCHUS, COMBINED N/A 2/19/2021    Procedure: BRONCHOSCOPY, flexible, airway dilation, and bronchial wash;  Surgeon: Wilber Lin  MD Alana;  [facility]    BRONCHOSCOPY, DILATE BRONCHUS, STENT BRONCHUS, COMBINED N/A 4/9/2021    Procedure: BRONCHOSCOPY, flexible and rigid, Airway suctioning;  Surgeon: Mati Norris MD;  [facility]    BRONCHOSCOPY, DILATE BRONCHUS, STENT BRONCHUS, COMBINED N/A 10/17/2023    Procedure: RIGID, flexible bronchoscopy with airway dilation;  Surgeon: Preet Patel MD;  [facility]    CV RIGHT HEART CATH MEASUREMENTS RECORDED N/A 3/10/2020    Procedure: CV RIGHT HEART CATH;  Surgeon: Wai Garcia MD;  [facility]    ENT SURGERY      tonsillectomy as a child    ESOPHAGOSCOPY, GASTROSCOPY, DUODENOSCOPY (EGD), COMBINED N/A 10/29/2018    Procedure: COMBINED ESOPHAGOSCOPY, GASTROSCOPY, DUODENOSCOPY (EGD) with biopsies and polypectomy;  Surgeon: Chente Bloom MD;  [facility]    INSERT EXTRACORPORAL MEMBRANE OXYGENATOR ADULT (OUTSIDE OR) N/A 2/27/2018    Procedure: INSERT EXTRACORPORAL MEMBRANE OXYGENATOR ADULT (OUTSIDE OR);  INSERT EXTRACORPORAL MEMBRANE OXYGENATOR ADULT (OUTSIDE OR) ;  Surgeon: Toby Hernandez MD;  [facility]    IR CVC TUNNEL PLACEMENT > 5 YRS OF AGE  10/25/2019    IR DIALYSIS FISTULOGRAM LEFT  3/2/2021    IR DIALYSIS FISTULOGRAM LEFT  11/2/2023    IR DIALYSIS MECH THROMB, PTA  3/2/2021    IR FLUORO 0-1 HOUR  5/7/2021    IR GASTRO JEJUNOSTOMY TUBE PLACEMENT  2/16/2021    IR PARACENTESIS  1/8/2020    IR THORACENTESIS  9/13/2019    IR TRANSCATHETER BIOPSY  1/8/2020    LASER CO2 BRONCHOSCOPY N/A 4/30/2021    Procedure: Flexible and Rigid Bronchoscopy and Tissue Debulking with CO2 Laser Assistance;  Surgeon: Mati Norris MD;  [facility]    LASER CO2 BRONCHOSCOPY N/A 6/11/2021    Procedure: BRONCHOSCOPY, Flexible and Rigid Bronchoscopy, Tissue Debulking with cryo Assistance, airway dilation,;  Surgeon: Mati Norris MD;  [facility]    LASER CO2 BRONCHOSCOPY N/A 9/16/2021    Procedure: BRONCHOSCOPY, flexible and  rigid, CO2 Laser Debulking;  Surgeon: Mati Norris MD;  [facility]    LASER CO2 BRONCHOSCOPY N/A 2/11/2022    Procedure: flexible, rigid bronchoscopy, tissue debulking, airway dilation, co2 laser, bronchoalveolar lavage;  Surgeon: Juana Baugh MD;  [facility]    no prior surgery      REMOVE EXTRACORPORAL MEMBRANE OXYGENATOR ADULT N/A 3/3/2018    Procedure: REMOVE EXTRACORPORAL MEMBRANE OXYGENATOR ADULT;  Removal of Right Femoral Venous and Right Axillary Arterial Extracorporeal Membrane Oxygenator;  Surgeon: Toby Hernandez MD;  [facility]    TRANSPLANT LUNG RECIPIENT SINGLE X2 Bilateral 3/1/2018    Procedure: TRANSPLANT LUNG RECIPIENT SINGLE X2;  Median Sternotomy, Extracorporeal Membrane Oxygenator, bilateral sequential lung transplant;  Surgeon: Toby Hernandez MD;  [facility]      Allergies   Allergen Reactions    Isopropyl Alcohol Other (See Comments)     The alcohol burns the open areas on her skin when used as a skin prep for procedures    Vancomycin Other (See Comments)     Diffuse erythroderma with itching (improved with Benadryl) after receiving vancomycin over 1 hour. 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 Can use vancomycin in the future, but please give over slower infusion time (at least 2 hours) and premedicate with Benadryl.      Social History     Tobacco Use    Smoking status: Never    Smokeless tobacco: Never   Substance Use Topics    Alcohol use: No     Alcohol/week: 1.0 standard drink of alcohol     Types: 1 Glasses of wine per week      Wt Readings from Last 1 Encounters:   11/17/23 57.8 kg (127 lb 6.8 oz)        Medications Prior to Admission   Medication Sig Dispense Refill Last Dose    acetylcysteine (MUCOMYST) 10 % nebulizer solution Inhale 4 mLs into the lungs 3 times daily 90 mL 0     albuterol (PROVENTIL) (2.5 MG/3ML) 0.083% neb solution Take 1 vial (2.5 mg) by  nebulization every 6 hours as needed for shortness of breath, wheezing or cough 360 mL 4     azaTHIOprine (IMURAN) 50 MG tablet Take 1 tablet (50 mg) by mouth daily for 30 days 30 tablet 0     azithromycin (ZITHROMAX) 250 MG tablet Take 1 tablet (250 mg) by mouth daily       budesonide (PULMICORT) 0.5 MG/2ML neb solution Take 2 mLs (0.5 mg) by nebulization daily as needed 60 mL 0     fluticasone-salmeterol (ADVAIR) 250-50 MCG/ACT inhaler Inhale 1 puff into the lungs every 12 hours 60 each 11     levalbuterol (XOPENEX) 1.25 MG/3ML neb solution Take 3 mLs (1.25 mg) by nebulization 3 times daily 270 mL 0     midodrine (PROAMATINE) 10 MG tablet Take 1 tablet (10 mg) by mouth 3 times daily (with meals) 90 tablet 0     montelukast (SINGULAIR) 10 MG tablet Take 1 tablet (10 mg) by mouth At Bedtime 30 tablet 11     pantoprazole (PROTONIX) 40 MG EC tablet Take 1 tablet (40 mg) by mouth every morning (before breakfast) 30 tablet 11     posaconazole (NOXAFIL) 100 MG EC tablet Take 3 tablets (300 mg) by mouth daily 90 tablet 11     predniSONE (DELTASONE) 5 MG tablet Take 2 tablets (10 mg) by mouth every morning for 2 days, THEN 1 tablet (5 mg) every morning for 28 days. 32 tablet 0     sulfamethoxazole-trimethoprim (BACTRIM) 400-80 MG tablet Take 1 tablet by mouth three times a week (Patient taking differently: Take 1 tablet by mouth three times a week MW) 13 tablet 11     tacrolimus (GENERIC) 0.5 MG capsule Take 1 capsule (0.5 mg) by mouth every morning Total dose: 0.5 mg in AM and 1 mg in PM 90 capsule 3     tacrolimus (GENERIC) 1 MG capsule Take 1 capsule (1 mg) by mouth every evening Total dose: 0.5 mg in AM and 1 mg in PM       valGANciclovir (VALCYTE) 450 MG tablet Take 1 tablet (450 mg) by mouth twice a week Monday and Fridays after dialysis       acetaminophen (TYLENOL) 325 MG tablet Take 1 tablet (325 mg) by mouth every 4 hours as needed for mild pain or fever 60 tablet      benzonatate (TESSALON) 100 MG capsule Take  1 capsule (100 mg) by mouth every 4 hours as needed for cough 30 capsule 0     cholecalciferol 50 MCG (2000 UT) CAPS Take 1 capsule by mouth daily On dialysis days (MWF)       Magnesium Cl-Calcium Carbonate (SLOW-MAG) 71.5-119 MG TBEC Take 2 tablets by mouth four times a week Take on non dialysis days T/Th/Sa/Hope 90 tablet 3          Anesthesia Evaluation   Pt has had prior anesthetic. Type: General.    History of anesthetic complications  - PONV.      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  No regional wall motion abnormalities are seen.  Left ventricular size is normal.  Global right ventricular function/size appear normal (RV is more difficult to  visualize).  No significant valvular abnormalities present.  Mild tricuspid insufficiency is present.  The right ventricular systolic pressure is 45mmHg above the right atrial  pressure.  Pulmonary hypertension is present.      (+)  hypertension- -   -  - -                        dysrhythmias (Paroxysmal AFib),        pulmonary hypertension, Previous cardiac testing   Echo: Date: Results:      Stress Test:  Date: 8/2023 Results:  The nuclear stress test is negative for inducible myocardial ischemia or infarction.  Hyperdynamic LV function with LLV EF over 75%.    ECG Reviewed:  Date: Results:    Cath:  Date: Results:      METS/Exercise Tolerance:  Comment: Dermatomyositis with Raynauds   Hematologic:  - neg hematologic  ROS   (+)       "anemia,          Musculoskeletal: Comment: Raynaud's, RA, antisynthetase syndrome  - Left knee pain      GI/Hepatic: Comment: - Congestive hepatopathy with fibrosis      (+) GERD, Asymptomatic on medication,           liver disease,       Renal/Genitourinary:     (+) renal disease, type: ESRD, Pt requires dialysis, type: Hemodialysis,          Endo:  - neg endo ROS   (+)            Chronic steroid usage for Post Transplant Immunosuppression.         Psychiatric/Substance Use:     (+) psychiatric history anxiety       Infectious Disease:       Malignancy:  - neg malignancy ROS     Other:          Blood pressure (!) 158/91, pulse 99, temperature 36.6  C (97.8  F), temperature source Oral, resp. rate 20, height 1.6 m (5' 3\"), weight 57.8 kg (127 lb 6.8 oz), last menstrual period 06/07/2014, SpO2 100%, not currently breastfeeding.    Physical Exam    Airway        Mallampati: III   TM distance: > 3 FB   Neck ROM: full   Mouth opening: > 3 cm    Respiratory Devices and Support         Dental     Comment: Chipped upper central incisors    (+) chipped    B=Bridge, C=Chipped, L=Loose, M=Missing    Cardiovascular          Rhythm and rate: regular and normal     Pulmonary   pulmonary exam normal        breath sounds clear to auscultation           OUTSIDE LABS:  CBC:   Lab Results   Component Value Date    WBC 11.9 (H) 11/04/2023    WBC 8.8 11/03/2023    HGB 8.5 (L) 11/04/2023    HGB 7.9 (L) 11/03/2023    HCT 26.8 (L) 11/04/2023    HCT 24.8 (L) 11/03/2023     11/04/2023     11/03/2023     BMP:   Lab Results   Component Value Date     11/04/2023     11/03/2023    POTASSIUM 3.8 11/04/2023    POTASSIUM 3.6 11/03/2023    CHLORIDE 101 11/08/2023    CHLORIDE 103 11/04/2023    CO2 25 11/04/2023    CO2 23 11/03/2023    BUN 14.1 11/04/2023    BUN 24.6 (H) 11/03/2023    CR 2.86 (H) 11/04/2023    CR 3.68 (H) 11/03/2023     (H) 11/04/2023     (H) 11/03/2023     COAGS:   Lab Results   Component " Value Date    PTT 28 02/12/2021    INR 0.99 10/24/2023    FIBR 358 02/11/2021     POC:   Lab Results   Component Value Date    BGM 75 06/11/2021    HCG Negative 06/06/2019     HEPATIC:   Lab Results   Component Value Date    ALBUMIN 3.2 (L) 11/04/2023    PROTTOTAL 6.3 (L) 11/04/2023    ALT 24 11/04/2023    AST 22 11/04/2023     (H) 11/11/2019    ALKPHOS 179 (H) 11/04/2023    BILITOTAL 0.5 11/04/2023    SALAS <10 (L) 01/24/2021     OTHER:   Lab Results   Component Value Date    PH 7.41 02/10/2021    LACT 0.9 10/30/2023    A1C 5.2 05/26/2022    CHELSEY 8.8 11/04/2023    PHOS 3.0 11/04/2023    MAG 1.5 (L) 11/01/2023    LIPASE 24 10/25/2023    AMYLASE 58 02/19/2018    TSH 1.23 07/11/2023    CRP 25.0 (H) 10/06/2019    SED 66 (H) 10/29/2023       Anesthesia Plan    ASA Status:  3    NPO Status:  NPO Appropriate    Anesthesia Type: General.   Induction: Intravenous, Propofol.   Maintenance: TIVA.   Techniques and Equipment:     - Airway: Jet ventilation       Consents    Anesthesia Plan(s) and associated risks, benefits, and realistic alternatives discussed. Questions answered and patient/representative(s) expressed understanding.     - Discussed: Risks, Benefits and Alternatives for BOTH SEDATION and the PROCEDURE were discussed     - Discussed with:  Patient            Postoperative Care    Pain management: Oral pain medications, IV analgesics.   PONV prophylaxis: Ondansetron (or other 5HT-3), Background Propofol Infusion, Dexamethasone or Solumedrol     Comments:    Other Comments: The patient was given an opportunity to ask questions after discussion of the anesthetic plan and risks. All questions were answered. The patient wishes to proceed with the anesthetic plan.    Discussed potential for risks of dental damage and injury to oral soft tissues: Yes    Discussed potential for risk of positioning injuries: Yes    The anesthetic risks discussed include but are not limited to brain damage, damage to internal organs,  heart attack, stroke, awareness, nausea/vomiting and exacerbation of underlying medical problems.        H&P reviewed: Unable to attach H&P to encounter due to EHR limitations. H&P Update: appropriate H&P reviewed, patient examined. No interval changes since H&P (within 30 days).         Emanuel Coronel MD

## 2023-11-19 NOTE — PROGRESS NOTES
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 Last bronch on 11/17 with tissue debulking with dilation and suctioning, no stent placement.     1. Recurrent right mainstem bronchial stenosis s/p balloon dilation:  Post-obstructive pneumonia in setting of recurrent RMB stenosis:  Acute hypoxic respiratory failure: with actinomyces (+) BAL 10/17 thought to be contributing to slowly progressive pulmonary symptoms. S/p Zosyn then Augmentin for total of 14 day course. Most recent bronch on 11/17 per above.   - Bronch in 3 months, ~ 2/17/24    2. Bilateral lung transplant: complicated by above, now with ESLD secondary to likely progressive CLAD. Recent bronch improved symptoms for one day and then had recurrence of dyspnea requiring O2 with ongoing frequent and productive cough. Does not feel sick per se, very fatigued. DSA 10/27 negative. Sating 99% on 2 L O2. CT read pending for today. PFTs declined futher.  - 6 MWT and O2 overnight titration study, likely to need more than 2 L  - Referral to pulmonary rehab  - Sputum sample today if able  - Will do steroid burst and taper over 12 days  - Continue albuterol nebs TID-QID; stop mucomyst as it seems to be more irritating  than helpful  - Start Mucinex BID; tessalon perles PRN  - Immuknow and tacrolimus pending  - Continue IS including azathioprine (decrease to 25mg daily), tacrolimus and prednsione (hold baseline dose with burst)  - Bactrim for PJP ppx  CLAD: continue azithromycin, singulair, advair (will increase fluticasone dose after oral steroid burst)  - Palliative referral to discuss goals of care     3. Erythroderma:  H/o dermatomyositis: resolved.    4. LUE/LLE swelling:  L breast edema:  Lymphadenopathy: L arm appeared asymmetrically swollen compared to R arm, initially noted 10/27/23. On 10/28, LLE also more swollen and erythematous vs RLE. Edema significant to the point that wedding ring had to be cut off with mechanical saw. LUE US performed 10/27, 10/28, 10/30 all negative for DVT. B/l LE US 10/26, 10/28 negative for DVT. Multiple prominent retroperitoneal and L periaortic lymph nodes noted on CT abdomen 10/28, PTLD not ruled out, per pulm transplant team. CT chest 10/28 with no evidence of PE, but multiple b/l axillary and L retropectoral lymph nodes noted, L breast skin thickening and breast edema of uncertain etiology noted. US LUE AV dialysis fistula 11/1 demonstrating central venous stenosis/occlusion. Fistulogram with central segment venoplasty performed 11/2, brachiocephalic stenosis noted with multiple chest collaterals, discussed with IR, these findings correlate clinically with her LUE and chest/L breast edema, which have since resolved following venoplasty. Resolved.      5. EBV viremia: last level of 354 on 11/8.   - EBV pending for today     6. CMV viremia: low grade CMV viremia since 8/23. Was started on VGCV three times weekly after HD approx 10/6/23. VGCV dosing increased to daily beginning 10/27 given persisting low-level viremia on repeat CMV levels. Last CMV negative 11/1 and 11/8 negative.   - Valgancyclovir 450 mg twice times weekly, will continue for at least one week after steroid burst/taper      7. Recurrent transient hypotension w/o altered mentation: responsive to albumin and midodrine. Bedside cardiac ultrasound 10/27 with collapsible IVC. Episodic hypotension thought to be within patient's normal range as Kecia was asymptomatic during RRTs. TTE completed 10/27 w/ EF 60-65%. Midodrine discontinued by renal secondary to hypertension. No acute issues.      8. Left-sided aspergillus empyema:   A. Ochraceus: noted in 2019 s/p needle aspiration with Aspergillus fumigatus on cultures s/p intrapleural amphotericin bead placement. Now with A. Ochraceus on BAL from 11/1. Posaconazole indefinitely per ID.  - Continue posaconazole  - Levels every 6 months     9. ESRD on iHD (since 10/2019):  HTN: continues on iHD M/W/F. BP elevated today at 164/89, midodrine discontinued. At this point, pulmonary function precludes Kecia from renal transplant, discussed with patient and her  Ranjan, today.     10. Neutropenia: with ANC of 0.8 today. Suspect secondary to medications.  - Decrease azathioprine to 25 mg daily  - Continue valcyte prophylaxis  - Neupogen today     Chronic issues:  1. Paroxysmal AFib  2. Hypomagnesemia  3. Congestive hepatopathy with fibrosis  4. Osteoporosis    Excluding time spent reading PFTs, I have spent > 70 minutes on the day of encounter reviewing the patient's chart including admission notes, discussing vitals, labs, imaging, medications and plan of care with the patient and her .       RTC: 3-4 weeks  Vaccinations: got flu, needs to get covid and RSV vaccine  Preventative: colonoscopy due 2033; mammogram in February negative; DEXA > May 2024; following with Alexia Chow PA-C  Pulmonary, Allergy, Critical Care and Sleep Medicine        Interval History:     Got a call from Dr. Norris to do the bronch on 11/17 to try the CO2 laser again. Since that time, Kecia felt okay on Sat, couldn't catch her Sat night. Using 2 L o2 at rest, feels she likely needs more with activity. No  fever or chills, no sinus or nasal congestion, still coughing a lot, about the same since Friday. Productive, doesn't feel sick. Did cough up some dried blood on Sunday. Denies chest congestion, no tightness. No chest pain or pleuritic pain. No nausea, or vomiting, stools are back to baseline. No rash. Feels very tired          Review of Systems:   Please see HPI, otherwise the complete 10 point ROS is negative.           Past Medical and Surgical History:     Past Medical History:   Diagnosis Date    Acute on chronic respiratory failure with hypoxia (H) 02/21/2018    Anisocoria     Antisynthetase syndrome (H24) 2014    Anxiety     Aspergillus (H)     Aspergillus pneumonia (H) 11/20/2020    Benign essential hypertension     C. difficile colitis     Cytomegalovirus (CMV) viremia (H)     Dermatomyositis (H)     Dysplasia of cervix, low grade (ESTRADA 1)     EBV (Franklin-Barr virus) viremia     ESRD (end stage renal disease) on dialysis (H)     ILD (interstitial lung disease) (H)     Lung replaced by transplant (H)     Osteopenia     Paroxysmal atrial fibrillation (H)     Pneumocystis jiroveci pneumonia (H)     PONV (postoperative nausea and vomiting)     Post-menopause     Pulmonary hypertension (H)     Raynaud's disease     Seronegative rheumatoid arthritis (H)      Past Surgical History:   Procedure Laterality Date    BRONCHOSCOPY (RIGID OR FLEXIBLE), DIAGNOSTIC N/A 4/10/2018    Procedure: COMBINED BRONCHOSCOPY (RIGID OR FLEXIBLE), LAVAGE;;  Surgeon: Mariposa Donohue MD;  Location:  GI    BRONCHOSCOPY (RIGID OR FLEXIBLE), DIAGNOSTIC N/A 12/23/2020    Procedure: BRONCHOSCOPY, WITH BRONCHOALVEOLAR LAVAGE;  Surgeon: Uri Bass MD;  [facility]    BRONCHOSCOPY (RIGID OR FLEXIBLE), DIAGNOSTIC N/A 5/26/2022    Procedure: BRONCHOSCOPY, WITH BRONCHOALVEOLAR LAVAGE;  Surgeon: Uri Bass MD;  [facility]    BRONCHOSCOPY (RIGID OR FLEXIBLE), DIAGNOSTIC N/A 8/17/2023    Procedure: BRONCHOSCOPY, WITH  BRONCHOALVEOLAR LAVAGE;  Surgeon: Esau Bruno MD;  [facility]    BRONCHOSCOPY (RIGID OR FLEXIBLE), DIAGNOSTIC N/A 11/1/2023    Procedure: BRONCHOSCOPY, WITH BRONCHOALVEOLAR LAVAGE;  Surgeon: Beto Magaña DO;  [facility]    BRONCHOSCOPY (RIGID OR FLEXIBLE), DILATE BRONCHUS / TRACHEA N/A 10/11/2018    Procedure: BRONCHOSCOPY (RIGID OR FLEXIBLE), DILATE BRONCHUS / TRACHEA;  Flexible And Rigid Bronchoscopy and Dilation;  Surgeon: Wilber Lin MD;  [facility]    BRONCHOSCOPY (RIGID OR FLEXIBLE), DILATE BRONCHUS / TRACHEA N/A 11/1/2023    Procedure: Bronchoscopy (Rigid Or Flexible), Dilate Bronchus / Trachea;  Surgeon: Beto Magaña DO;  [facility]    BRONCHOSCOPY FLEXIBLE N/A 3/13/2018    Procedure: BRONCHOSCOPY FLEXIBLE;  Flexible Bronchoscopy ;  Surgeon: Gissell Sanchez MD;  [facility]    BRONCHOSCOPY FLEXIBLE N/A 5/9/2018    Procedure: BRONCHOSCOPY FLEXIBLE;;  Surgeon: Wilber Lin MD;  [facility]    BRONCHOSCOPY FLEXIBLE AND RIGID N/A 9/10/2018    Procedure: BRONCHOSCOPY FLEXIBLE AND RIGID;  Flexible and Rigid Bronchoscopy with Balloon Dilation, tissue debulking with cryo, and Right mainstem bronchus stent placement;  Surgeon: Wilber Lin MD;  [facility]    BRONCHOSCOPY RIGID N/A 6/6/2018    Procedure: BRONCHOSCOPY RIGID;;  Surgeon: Lopez Macias MD;  [facility]    BRONCHOSCOPY, DILATE BRONCHUS, STENT BRONCHUS, COMBINED N/A 6/11/2018    Procedure: COMBINED BRONCHOSCOPY, DILATE BRONCHUS, STENT BRONCHUS;  Flexible Bronchoscopy, Balloon Dilation, Bronchial Washings;  Surgeon: Wilber Lin MD;  [facility]    BRONCHOSCOPY, DILATE BRONCHUS, STENT BRONCHUS, COMBINED Right 7/10/2018    Procedure: COMBINED BRONCHOSCOPY, DILATE BRONCHUS, STENT BRONCHUS;  Flexible Bronchoscopy, Balloon Dilation, Bronchial Washings  ;  Surgeon: Wilber Lin MD;  [facility]    BRONCHOSCOPY, DILATE BRONCHUS, STENT  BRONCHUS, COMBINED N/A 8/2/2018    Procedure: COMBINED BRONCHOSCOPY, DILATE BRONCHUS, STENT BRONCHUS;  Flexible Bronchoscopy, Bronchial Washings, Balloon Dilation;  Surgeon: Wilber Lin MD;  [facility]    BRONCHOSCOPY, DILATE BRONCHUS, STENT BRONCHUS, COMBINED N/A 8/20/2018    Procedure: COMBINED BRONCHOSCOPY, DILATE BRONCHUS, STENT BRONCHUS;  Flexible Bronchoscopy, Balloon Dilation;  Surgeon: Wilber Lin MD;  [facility]    BRONCHOSCOPY, DILATE BRONCHUS, STENT BRONCHUS, COMBINED N/A 10/29/2018    Procedure: Flexible Bronchoscopy, Balloon Dilation, Stent Revision, Airway Examination And Therapeutic Suctioning, Cyro Tumor Debulking;  Surgeon: Wilber Lin MD;  [facility]    BRONCHOSCOPY, DILATE BRONCHUS, STENT BRONCHUS, COMBINED N/A 11/20/2018    Procedure: Rigid Bronchoscopy, Stent Removal and dilitation;  Surgeon: Wilber Lin MD;  [facility]    BRONCHOSCOPY, DILATE BRONCHUS, STENT BRONCHUS, COMBINED N/A 12/14/2018    Procedure: Flexible And Rigid Bronchoscopy, Balloon Dilation, Bronchial Washing;  Surgeon: Wilber Lin MD;  [facility]    BRONCHOSCOPY, DILATE BRONCHUS, STENT BRONCHUS, COMBINED N/A 1/17/2019    Procedure: Flexible And Rigid Bronchoscopy, Balloon Dilation.;  Surgeon: Wilber Lin MD;  [facility]    BRONCHOSCOPY, DILATE BRONCHUS, STENT BRONCHUS, COMBINED N/A 3/7/2019    Procedure: Flexible and Rigid Bronchoscopy, Bronchial Washing, Balloon Dilation;  Surgeon: Wilber Lin MD;  [facility]    BRONCHOSCOPY, DILATE BRONCHUS, STENT BRONCHUS, COMBINED N/A 6/6/2019    Procedure: Rigid and Flexible Bronchoscopy, Balloon Dilation;  Surgeon: Wilber Lin MD;  [facility]    BRONCHOSCOPY, DILATE BRONCHUS, STENT BRONCHUS, COMBINED N/A 10/11/2019    Procedure: Flexible and Rigid Bronchoscopy, Balloon Dilation, Bronchoalveolar Lagave;  Surgeon: Wilber Lin MD;  [facility]     BRONCHOSCOPY, DILATE BRONCHUS, STENT BRONCHUS, COMBINED N/A 2/19/2021    Procedure: BRONCHOSCOPY, flexible, airway dilation, and bronchial wash;  Surgeon: Wilber Lin MD;  [facility]    BRONCHOSCOPY, DILATE BRONCHUS, STENT BRONCHUS, COMBINED N/A 4/9/2021    Procedure: BRONCHOSCOPY, flexible and rigid, Airway suctioning;  Surgeon: Mati Norris MD;  [facility]    BRONCHOSCOPY, DILATE BRONCHUS, STENT BRONCHUS, COMBINED N/A 10/17/2023    Procedure: RIGID, flexible bronchoscopy with airway dilation;  Surgeon: Preet Patel MD;  [facility]    CV RIGHT HEART CATH MEASUREMENTS RECORDED N/A 3/10/2020    Procedure: CV RIGHT HEART CATH;  Surgeon: Wai Garcia MD;  [facility]    ENT SURGERY      tonsillectomy as a child    ESOPHAGOSCOPY, GASTROSCOPY, DUODENOSCOPY (EGD), COMBINED N/A 10/29/2018    Procedure: COMBINED ESOPHAGOSCOPY, GASTROSCOPY, DUODENOSCOPY (EGD) with biopsies and polypectomy;  Surgeon: Chente Bloom MD;  [facility]    INSERT EXTRACORPORAL MEMBRANE OXYGENATOR ADULT (OUTSIDE OR) N/A 2/27/2018    Procedure: INSERT EXTRACORPORAL MEMBRANE OXYGENATOR ADULT (OUTSIDE OR);  INSERT EXTRACORPORAL MEMBRANE OXYGENATOR ADULT (OUTSIDE OR) ;  Surgeon: Toby Hernandez MD;  [facility]    IR CVC TUNNEL PLACEMENT > 5 YRS OF AGE  10/25/2019    IR DIALYSIS FISTULOGRAM LEFT  3/2/2021    IR DIALYSIS FISTULOGRAM LEFT  11/2/2023    IR DIALYSIS MECH THROMB, PTA  3/2/2021    IR FLUORO 0-1 HOUR  5/7/2021    IR GASTRO JEJUNOSTOMY TUBE PLACEMENT  2/16/2021    IR PARACENTESIS  1/8/2020    IR THORACENTESIS  9/13/2019    IR TRANSCATHETER BIOPSY  1/8/2020    LASER CO2 BRONCHOSCOPY N/A 4/30/2021    Procedure: Flexible and Rigid Bronchoscopy and Tissue Debulking with CO2 Laser Assistance;  Surgeon: Mati Norris MD;  [facility]    LASER CO2 BRONCHOSCOPY N/A 6/11/2021    Procedure: BRONCHOSCOPY, Flexible and Rigid Bronchoscopy, Tissue  Debulking with cryo Assistance, airway dilation,;  Surgeon: Mati Norris MD;  [facility]    LASER CO2 BRONCHOSCOPY N/A 9/16/2021    Procedure: BRONCHOSCOPY, flexible and rigid, CO2 Laser Debulking;  Surgeon: Mati Norris MD;  [facility]    LASER CO2 BRONCHOSCOPY N/A 2/11/2022    Procedure: flexible, rigid bronchoscopy, tissue debulking, airway dilation, co2 laser, bronchoalveolar lavage;  Surgeon: Juana Baugh MD;  [facility]    LASER CO2 BRONCHOSCOPY Right 11/17/2023    Procedure: flexible bronchosocopy, tissue/tumor debulking, using CO2 laser, mitomycin application and argon plasma coagulation;  Surgeon: Mati Norris MD;  [facility]    no prior surgery      REMOVE EXTRACORPORAL MEMBRANE OXYGENATOR ADULT N/A 3/3/2018    Procedure: REMOVE EXTRACORPORAL MEMBRANE OXYGENATOR ADULT;  Removal of Right Femoral Venous and Right Axillary Arterial Extracorporeal Membrane Oxygenator;  Surgeon: Toby Hernandez MD;  [facility]    TRANSPLANT LUNG RECIPIENT SINGLE X2 Bilateral 3/1/2018    Procedure: TRANSPLANT LUNG RECIPIENT SINGLE X2;  Median Sternotomy, Extracorporeal Membrane Oxygenator, bilateral sequential lung transplant;  Surgeon: Toby Hernandez MD;  [facility]           Family History:     Family History   Problem Relation Age of Onset    Hypertension Mother     Arthritis Mother     Pancreatic Cancer Father     Diabetes Father             Social History:     Social History     Socioeconomic History    Marital status:      Spouse name: Not on file    Number of children: Not on file    Years of education: Not on file    Highest education level: Not on file   Occupational History    Not on file   Tobacco Use    Smoking status: Never    Smokeless tobacco: Never   Substance and Sexual Activity    Alcohol use: No     Alcohol/week: 1.0 standard drink of alcohol     Types: 1 Glasses of wine per week    Drug use: No    Sexual activity: Not on file   Other  "Topics Concern    Parent/sibling w/ CABG, MI or angioplasty before 65F 55M? No   Social History Narrative    3/6/2019 - Lives with . Has three daughters. Four grandchildren (two ). No pets. Travelled previously to [facility]. Has visited [place] several times.      Social Determinants of Health     Financial Resource Strain: Not on file   Food Insecurity: Not on file   Transportation Needs: Not on file   Physical Activity: Not on file   Stress: Not on file   Social Connections: Not on file   Interpersonal Safety: Not on file   Housing Stability: Not on file            Medications:     Current Outpatient Medications   Medication    acetaminophen (TYLENOL) 325 MG tablet    acetylcysteine (MUCOMYST) 10 % nebulizer solution    albuterol (PROVENTIL) (2.5 MG/3ML) 0.083% neb solution    azaTHIOprine (IMURAN) 50 MG tablet    azithromycin (ZITHROMAX) 250 MG tablet    benzonatate (TESSALON) 100 MG capsule    cholecalciferol 50 MCG (2000 UT) CAPS    fluticasone-salmeterol (ADVAIR) 250-50 MCG/ACT inhaler    guaiFENesin (MUCINEX) 600 MG 12 hr tablet    Magnesium Cl-Calcium Carbonate (SLOW-MAG) 71.5-119 MG TBEC    montelukast (SINGULAIR) 10 MG tablet    pantoprazole (PROTONIX) 40 MG EC tablet    posaconazole (NOXAFIL) 100 MG EC tablet    predniSONE (DELTASONE) 5 MG tablet    sulfamethoxazole-trimethoprim (BACTRIM) 400-80 MG tablet    tacrolimus (GENERIC) 0.5 MG capsule    tacrolimus (GENERIC) 1 MG capsule    [START ON 2023] valGANciclovir (VALCYTE) 450 MG tablet     No current facility-administered medications for this visit.            Physical Exam:   BP (!) 164/89   Pulse 100   Resp 17   Ht 1.6 m (5' 3\")   Wt 58.7 kg (129 lb 6.4 oz)   LMP 2014 (Exact Date)   SpO2 99%   BMI 22.92 kg/m      GENERAL: alert, NAD  HEENT: NCAT, EOMI, no scleral icterus, oral mucosa moist and without lesions  Neck: no cervical or supraclavicular adenopathy  Lungs: poor airflow, scattered rhonchi bilaterally "   CV: RRR, S1S2, no murmurs noted  Abdomen: normoactive BS, soft  Lymph: no edema  Neuro: AAO X 3, CN 2-12 grossly intact  Psychiatric: normal affect, good eye contact  Skin: no rash, jaundice or lesions on limited exam         Data:   All laboratory and imaging data reviewed.      Recent Results (from the past 168 hour(s))   Magnesium    Collection Time: 11/21/23  7:52 AM   Result Value Ref Range    Magnesium 1.8 1.7 - 2.3 mg/dL   Comprehensive metabolic panel    Collection Time: 11/21/23  7:52 AM   Result Value Ref Range    Sodium 140 135 - 145 mmol/L    Potassium 3.6 3.4 - 5.3 mmol/L    Carbon Dioxide (CO2) 34 (H) 22 - 29 mmol/L    Anion Gap 8 7 - 15 mmol/L    Urea Nitrogen 18.2 8.0 - 23.0 mg/dL    Creatinine 2.60 (H) 0.51 - 0.95 mg/dL    GFR Estimate 20 (L) >60 mL/min/1.73m2    Calcium 9.2 8.8 - 10.2 mg/dL    Chloride 98 98 - 107 mmol/L    Glucose 108 (H) 70 - 99 mg/dL    Alkaline Phosphatase 208 (H) 40 - 150 U/L    AST 27 0 - 45 U/L    ALT 23 0 - 50 U/L    Protein Total 7.3 6.4 - 8.3 g/dL    Albumin 3.8 3.5 - 5.2 g/dL    Bilirubin Total 0.8 <=1.2 mg/dL   CBC with platelets and differential    Collection Time: 11/21/23  7:52 AM   Result Value Ref Range    WBC Count 1.4 (L) 4.0 - 11.0 10e3/uL    RBC Count 2.30 (L) 3.80 - 5.20 10e6/uL    Hemoglobin 8.2 (L) 11.7 - 15.7 g/dL    Hematocrit 26.0 (L) 35.0 - 47.0 %     (H) 78 - 100 fL    MCH 35.7 (H) 26.5 - 33.0 pg    MCHC 31.5 31.5 - 36.5 g/dL    RDW 23.3 (H) 10.0 - 15.0 %    Platelet Count 120 (L) 150 - 450 10e3/uL    % Neutrophils 53 %    % Lymphocytes 29 %    % Monocytes 14 %    % Eosinophils 2 %    % Basophils 1 %    % Immature Granulocytes 1 %    NRBCs per 100 WBC 0 <1 /100    Absolute Neutrophils 0.8 (L) 1.6 - 8.3 10e3/uL    Absolute Lymphocytes 0.4 (L) 0.8 - 5.3 10e3/uL    Absolute Monocytes 0.2 0.0 - 1.3 10e3/uL    Absolute Eosinophils 0.0 0.0 - 0.7 10e3/uL    Absolute Basophils 0.0 0.0 - 0.2 10e3/uL    Absolute Immature Granulocytes 0.0 <=0.4 10e3/uL     Absolute NRBCs 0.0 10e3/uL   General PFT Lab (Please always keep checked)    Collection Time: 11/21/23  8:28 AM   Result Value Ref Range    FVC-Pred 2.89 L    FVC-Pre 0.85 L    FVC-%Pred-Pre 29 %    FEV1-Pre 0.78 L    FEV1-%Pred-Pre 33 %    FEV1FVC-Pred 80 %    FEV1FVC-Pre 91 %    FEFMax-Pred 6.24 L/sec    FEFMax-Pre 2.11 L/sec    FEFMax-%Pred-Pre 33 %    FEF2575-Pred 2.12 L/sec    FEF2575-Pre 0.98 L/sec    BCS7776-%Pred-Pre 46 %    ExpTime-Pre 4.06 sec    FIFMax-Pre 2.20 L/sec    FEV1FEV6-Pred 81 %    FEV1FEV6-Pre 91 %     PFT interpretation:  Maneuver: valid and meets ATS guidelines  Severe obstruction based on Z score  Compared to prior: FEV1 of 0.78 is 90 ml below prior  The decrease in FVC may represent air trapping v. restrictive physiology.  Lung volumes would be necessary to determine.

## 2023-11-21 NOTE — RESULT ENCOUNTER NOTE
Armand Mcdonnell,   Your tacrolimus level was 8.5 at 12 hours on 11/21/23 which is within your goal range of 8-10. No dose change at this time. Please call the transplant office ([phone]) with any questions.   Thanks,   Mikayla

## 2023-11-21 NOTE — PATIENT INSTRUCTIONS
Patient Instructions  1. Continue to hydrate with 70 oz fluids daily.  2. Continue to exercise daily or most days of the week.  3. Follow up with your primary care provider for annual gender health maintenance procedures.  4. Follow up with colonoscopy schedule.  5. Follow up with annual dermatology visits.  6. Do a 6 minute walk test locally.  7. Give a sputum sample.  8. Plan to restart pulmonary rehab.  9. Palliative care referral placed.  10. Plan for high dose steroid taper.  Continue to take   11. Use albuterol nebs 3-4 times per day.  12. Use mucomyst nebs as needed.  13. Plan for Peter to follow up with you about your meds and dialysis questions.  14. Start taking mucinex to help thin and break up your sputum.  15. Get one dose of neupogen.  16. Lower your azathioprine dose to 25 mg daily.  17. Plan for overnight oximetry study on 2 L.    Next transplant clinic appointment:  3-4 weeks with CXR, labs and PFTs  Next lab draw: weekly    ~~~~~~~~~~~~~~~~~~~~~~~~~    Thoracic Transplant Office phone [phone], fax [phone]    Office Hours 8:30 - 5:00     For after-hours urgent issues, please dial [phone] and ask the  to page the Thoracic Transplant Coordinator On-Call.   --------------------  To expedite your medication refill(s), please contact your pharmacy and have them fax a refill request to: [phone]    *Please allow 3 business days for routine medication refills.  *Please allow 5 business days for controlled substance medication refills.    **For Diabetic medications and supplies refill(s), please contact your pharmacy and have them contact your Endocrine team.  --------------------  For scheduling appointments call [phone].  --------------------  Please Note: If you are active on [employer], all future test results will be sent by [employer] message only, and will no longer be called to patient. You may also receive communication directly from your physician.

## 2023-11-21 NOTE — PROGRESS NOTES
Order for pulm rehab and 6MW with oxygen titration faxed to local clinic. Will fax clinic note shortly once signed.

## 2023-11-21 NOTE — CONFIDENTIAL NOTE
CLEANING INSTRUCTIONS FOR NON-DISPOSABLE (VANIA LC or Brito Sidestream) NEBULIZER CUPS   Met with patient and  to review neb cleaning. Reviewed all techniques, and they would like to proceed with bottle sterilizer technique. Additional sidestream cups provided to ease routine at home. Cleaning instructions provided via handout.          Always wash your hands thoroughly with soap and water using the proper hand-washing procedure OR use an alcohol based hand  prior to cleaning equipment.     BOTTLE STERILIZER   Gather equipment: Nebulizer cups and masks, small bowl with lid, clear anti-bacterial dish soap, electric counter-top bottle sterilizer device, and paper towels.     Follow below steps:  Separate the parts of the nebulizer cup.   Place a drop of soap in the small bowl and fill with warm water. Add nebulizer cup pieces and masks. Place the lid on the bowl and swish to clean nebulizer pieces and masks.   Drain and rinse well under warm water.   Place parts into the sterilizer. Add water according to device specifications. Turn on and run device as instructed.    Once cycle is complete, set all parts on a paper towel to dry. DO NOT store parts inside the sterilizer. Once equipment is dry, place in a sealed container and store until needed.     To watch a video demonstration of bottle sterilizer technique, go to: [email or web address] [email or web address]/[email or web address]?[email or web address]        ADDITIONAL TIPS:     Weekly: Wipe off outside of nebulizer tubing, nebulizer, and vest compressors with rubbing alcohol. If moisture collects inside the tubing, immediately discard and replace.     Monthly: Discard and replace nebulizer tubing and change neb compressor filter.

## 2023-11-21 NOTE — LETTER
PHYSICIAN ORDERS      DATE & TIME ISSUED: 2023 9:55 AM  PATIENT NAME: Kecia Blue   : 1960     [facility] MR# [if applicable]: [id number]     DIAGNOSIS:  Lung Transplant  Z94.2    Pulmonary rehab twice weekly x12 weeks or what insurance will allow. Will fax clinic note shortly.     Any questions please call: Mikayla     Please fax these results to [phone].        Ame Chow PA-C

## 2023-11-21 NOTE — PROGRESS NOTES
TERRI RX SIGNED BY: NICKIE OSMAN  DATE: 11/21/23  STUDY PERFORMED ON (PAP/O2/RA): ON 2LPM OF OXYGEN  DOWNLOAD METHOD (BREATHE/NONIN): NONIN  PROVIDER FAX:[phone]    DID YOU DOCUMENT CONTACT ATTEMPTS IN [facility]? YES   AFTER FIRST ATTEMPT PLEASE VERIFY PHONE NUMBER IN [employer] MATCHES PHONE NUMBER IN EPIC.    11/21 AV- RCVD REFERRAL FOR TERRI ON 2LPM OF OXYGEN. PT DOES NOT GET OXYGEN THROUGH [facility], REFERRAL WILL NEED TO BE SENT TO PROVIDER THAT IS PROVIDING OXYGEN SERVICES TO PT.    SPOKE WITH EMORY AT CLINIC AND SHE IS UNSURE WHO PT HAS OXYGEN THROUGH OR IF SHE IS EVEN BEING SERVICES BY ANOTHER PROVIDER.  INFORMED EMORY IF THEY ARE UNABLE TO LOCATE PROVIDER AND PT PURCHASED EQUIPMENT, SHE COULD GIVE ME A CALL BACK TO COORDINATE TERRI WITH PATIENT BUT IT SHOULD GO THROUGH HER OXYGEN PROVIDER.  CANCELLING TERRI INTAKE AND EMORY WILL CALL ME BACK IF WE STILL NEED TO COORDINATE.

## 2023-11-21 NOTE — TELEPHONE ENCOUNTER
PA Initiation    Medication: GRANIX 300 MCG/ML SC SOLN  Insurance Company: [employer] - Phone [phone] Fax [phone]  Pharmacy Filling the Rx:    Filling Pharmacy Phone:    Filling Pharmacy Fax:    Start Date: 11/21/2023

## 2023-11-21 NOTE — LETTER
11/21/2023         RE: Kecia Blue  [address] [place] [address]        Dear Colleague,    Thank you for referring your patient, Kecia Blue, to the [facility] [place]. Please see a copy of my visit note below.    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 Last bronch on 11/17 with tissue debulking with dilation and suctioning, no stent placement.     1. Recurrent right mainstem bronchial stenosis s/p balloon dilation:  Post-obstructive pneumonia in setting of recurrent RMB stenosis:  Acute hypoxic respiratory failure: with actinomyces (+) BAL 10/17 thought to be contributing to slowly progressive pulmonary symptoms. S/p Zosyn then Augmentin for total of 14 day course. Most recent bronch on 11/17 per above.   - Bronch in 3 months, ~ 2/17/24    2. Bilateral lung transplant: complicated by above, now with ESLD secondary to likely progressive CLAD. Recent bronch improved symptoms for one day and then had recurrence of dyspnea requiring O2 with ongoing frequent and productive cough. Does not feel sick per se, very fatigued. DSA 10/27 negative. Sating 99% on 2 L O2. CT read  pending for today. PFTs declined futher.  - 6 MWT and O2 overnight titration study, likely to need more than 2 L  - Referral to pulmonary rehab  - Sputum sample today if able  - Will do steroid burst and taper over 12 days  - Continue albuterol nebs TID-QID; stop mucomyst as it seems to be more irritating than helpful  - Start Mucinex BID; tessalon perles PRN  - Immuknow and tacrolimus pending  - Continue IS including azathioprine (decrease to 25mg daily), tacrolimus and prednsione (hold baseline dose with burst)  - Bactrim for PJP ppx  CLAD: continue azithromycin, singulair, advair (will increase fluticasone dose after oral steroid burst)  - Palliative referral to discuss goals of care     3. Erythroderma:  H/o dermatomyositis: resolved.    4. LUE/LLE swelling:  L breast edema:  Lymphadenopathy: L arm appeared asymmetrically swollen compared to R arm, initially noted 10/27/23. On 10/28, LLE also more swollen and erythematous vs RLE. Edema significant to the point that wedding ring had to be cut off with mechanical saw. LUE US performed 10/27, 10/28, 10/30 all negative for DVT. B/l LE US 10/26, 10/28 negative for DVT. Multiple prominent retroperitoneal and L periaortic lymph nodes noted on CT abdomen 10/28, PTLD not ruled out, per pulm transplant team. CT chest 10/28 with no evidence of PE, but multiple b/l axillary and L retropectoral lymph nodes noted, L breast skin thickening and breast edema of uncertain etiology noted. US LUE AV dialysis fistula 11/1 demonstrating central venous stenosis/occlusion. Fistulogram with central segment venoplasty performed 11/2, brachiocephalic stenosis noted with multiple chest collaterals, discussed with IR, these findings correlate clinically with her LUE and chest/L breast edema, which have since resolved following venoplasty. Resolved.      5. EBV viremia: last level of 354 on 11/8.   - EBV pending for today     6. CMV viremia: low grade CMV viremia since 8/23. Was started on  VGCV three times weekly after HD approx 10/6/23. VGCV dosing increased to daily beginning 10/27 given persisting low-level viremia on repeat CMV levels. Last CMV negative 11/1 and 11/8 negative.   - Valgancyclovir 450 mg twice times weekly, will continue for at least one week after steroid burst/taper     7. Recurrent transient hypotension w/o altered mentation: responsive to albumin and midodrine. Bedside cardiac ultrasound 10/27 with collapsible IVC. Episodic hypotension thought to be within patient's normal range as Kecia was asymptomatic during RRTs. TTE completed 10/27 w/ EF 60-65%. Midodrine discontinued by renal secondary to hypertension. No acute issues.      8. Left-sided aspergillus empyema:   A. Ochraceus: noted in 2019 s/p needle aspiration with Aspergillus fumigatus on cultures s/p intrapleural amphotericin bead placement. Now with A. Ochraceus on BAL from 11/1. Posaconazole indefinitely per ID.  - Continue posaconazole  - Levels every 6 months     9. ESRD on iHD (since 10/2019):  HTN: continues on iHD M/W/F. BP elevated today at 164/89, midodrine discontinued. At this point, pulmonary function precludes Kecia from renal transplant, discussed with patient and her  Ranjan, today.     10. Neutropenia: with ANC of 0.8 today. Suspect secondary to medications.  - Decrease azathioprine to 25 mg daily  - Continue valcyte prophylaxis  - Neupogen today     Chronic issues:  1. Paroxysmal AFib  2. Hypomagnesemia  3. Congestive hepatopathy with fibrosis  4. Osteoporosis    Excluding time spent reading PFTs, I have spent > 70 minutes on the day of encounter reviewing the patient's chart including admission notes, discussing vitals, labs, imaging, medications and plan of care with the patient and her .       RTC: 3-4 weeks  Vaccinations: got flu, needs to get covid and RSV vaccine  Preventative: colonoscopy due 2033; mammogram in February negative; DEXA > May 2024; following with Alexia Chow  [facility]  Pulmonary, Allergy, Critical Care and Sleep Medicine        Interval History:     Got a call from Dr. Norris to do the bronch on 11/17 to try the CO2 laser again. Since that time, Kecia felt okay on Sat, couldn't catch her Sat night. Using 2 L o2 at rest, feels she likely needs more with activity. No fever or chills, no sinus or nasal congestion, still coughing a lot, about the same since Friday. Productive, doesn't feel sick. Did cough up some dried blood on Sunday. Denies chest congestion, no tightness. No chest pain or pleuritic pain. No nausea, or vomiting, stools are back to baseline. No rash. Feels very tired          Review of Systems:   Please see HPI, otherwise the complete 10 point ROS is negative.           Past Medical and Surgical History:     Past Medical History:   Diagnosis Date     Acute on chronic respiratory failure with hypoxia (H) 02/21/2018     Anisocoria      Antisynthetase syndrome (H24) 2014     Anxiety      Aspergillus (H)      Aspergillus pneumonia (H) 11/20/2020     Benign essential hypertension      C. difficile colitis      Cytomegalovirus (CMV) viremia (H)      Dermatomyositis (H)      Dysplasia of cervix, low grade (ESTRADA 1)      EBV (Franklin-Barr virus) viremia      ESRD (end stage renal disease) on dialysis (H)      ILD (interstitial lung disease) (H)      Lung replaced by transplant (H)      Osteopenia      Paroxysmal atrial fibrillation (H)      Pneumocystis jiroveci pneumonia (H)      PONV (postoperative nausea and vomiting)      Post-menopause      Pulmonary hypertension (H)      Raynaud's disease      Seronegative rheumatoid arthritis (H)      Past Surgical History:   Procedure Laterality Date     BRONCHOSCOPY (RIGID OR FLEXIBLE), DIAGNOSTIC N/A 4/10/2018    Procedure: COMBINED BRONCHOSCOPY (RIGID OR FLEXIBLE), LAVAGE;;  Surgeon: Mariposa Donohue MD;  Location:  GI     BRONCHOSCOPY (RIGID OR FLEXIBLE), DIAGNOSTIC N/A 12/23/2020    Procedure: BRONCHOSCOPY, WITH  BRONCHOALVEOLAR LAVAGE;  Surgeon: Uri Bass MD;  [facility]: [facility]     BRONCHOSCOPY (RIGID OR FLEXIBLE), DIAGNOSTIC N/A 5/26/2022    Procedure: BRONCHOSCOPY, WITH BRONCHOALVEOLAR LAVAGE;  Surgeon: Uri Bass MD;  Location: [facility]     BRONCHOSCOPY (RIGID OR FLEXIBLE), DIAGNOSTIC N/A 8/17/2023    Procedure: BRONCHOSCOPY, WITH BRONCHOALVEOLAR LAVAGE;  Surgeon: Esau Bruno MD;  Location: [facility]     BRONCHOSCOPY (RIGID OR FLEXIBLE), DIAGNOSTIC N/A 11/1/2023    Procedure: BRONCHOSCOPY, WITH BRONCHOALVEOLAR LAVAGE;  Surgeon: Beto Magaña DO;  Location: [facility]     BRONCHOSCOPY (RIGID OR FLEXIBLE), DILATE BRONCHUS / TRACHEA N/A 10/11/2018    Procedure: BRONCHOSCOPY (RIGID OR FLEXIBLE), DILATE BRONCHUS / TRACHEA;  Flexible And Rigid Bronchoscopy and Dilation;  Surgeon: Wilber Lin MD;  [facility]     BRONCHOSCOPY (RIGID OR FLEXIBLE), DILATE BRONCHUS / TRACHEA N/A 11/1/2023    Procedure: Bronchoscopy (Rigid Or Flexible), Dilate Bronchus / Trachea;  Surgeon: Beto Magaña DO;  [facility]: [facility]     BRONCHOSCOPY FLEXIBLE N/A 3/13/2018    Procedure: BRONCHOSCOPY FLEXIBLE;  Flexible Bronchoscopy ;  Surgeon: Gissell Sanchez MD;  [facility]: [facility]     BRONCHOSCOPY FLEXIBLE N/A 5/9/2018    Procedure: BRONCHOSCOPY FLEXIBLE;;  Surgeon: Wilber Lin MD;  [facility]     BRONCHOSCOPY FLEXIBLE AND RIGID N/A 9/10/2018    Procedure: BRONCHOSCOPY FLEXIBLE AND RIGID;  Flexible and Rigid Bronchoscopy with Balloon Dilation, tissue debulking with cryo, and Right mainstem bronchus stent placement;  Surgeon: Wilber Lin MD;  [facility]     BRONCHOSCOPY RIGID N/A 6/6/2018    Procedure: BRONCHOSCOPY RIGID;;  Surgeon: Lopez Macias MD;  [facility]     BRONCHOSCOPY, DILATE BRONCHUS, STENT BRONCHUS, COMBINED N/A 6/11/2018    Procedure: COMBINED BRONCHOSCOPY, DILATE BRONCHUS, STENT BRONCHUS;  Flexible Bronchoscopy, Balloon Dilation, Bronchial Washings;  Surgeon:  Wilber Lin MD;  [facility]     BRONCHOSCOPY, DILATE BRONCHUS, STENT BRONCHUS, COMBINED Right 7/10/2018    Procedure: COMBINED BRONCHOSCOPY, DILATE BRONCHUS, STENT BRONCHUS;  Flexible Bronchoscopy, Balloon Dilation, Bronchial Washings  ;  Surgeon: Wilber Lin MD;  [facility]     BRONCHOSCOPY, DILATE BRONCHUS, STENT BRONCHUS, COMBINED N/A 8/2/2018    Procedure: COMBINED BRONCHOSCOPY, DILATE BRONCHUS, STENT BRONCHUS;  Flexible Bronchoscopy, Bronchial Washings, Balloon Dilation;  Surgeon: Wilber Lin MD;  [facility]     BRONCHOSCOPY, DILATE BRONCHUS, STENT BRONCHUS, COMBINED N/A 8/20/2018    Procedure: COMBINED BRONCHOSCOPY, DILATE BRONCHUS, STENT BRONCHUS;  Flexible Bronchoscopy, Balloon Dilation;  Surgeon: Wilber Lin MD;  [facility]     BRONCHOSCOPY, DILATE BRONCHUS, STENT BRONCHUS, COMBINED N/A 10/29/2018    Procedure: Flexible Bronchoscopy, Balloon Dilation, Stent Revision, Airway Examination And Therapeutic Suctioning, Cyro Tumor Debulking;  Surgeon: Wilber Lin MD;  [facility]     BRONCHOSCOPY, DILATE BRONCHUS, STENT BRONCHUS, COMBINED N/A 11/20/2018    Procedure: Rigid Bronchoscopy, Stent Removal and dilitation;  Surgeon: Wilber Lin MD;  [facility]     BRONCHOSCOPY, DILATE BRONCHUS, STENT BRONCHUS, COMBINED N/A 12/14/2018    Procedure: Flexible And Rigid Bronchoscopy, Balloon Dilation, Bronchial Washing;  Surgeon: Wilebr Lin MD;  [facility]     BRONCHOSCOPY, DILATE BRONCHUS, STENT BRONCHUS, COMBINED N/A 1/17/2019    Procedure: Flexible And Rigid Bronchoscopy, Balloon Dilation.;  Surgeon: Wilber Lin MD;  [facility]     BRONCHOSCOPY, DILATE BRONCHUS, STENT BRONCHUS, COMBINED N/A 3/7/2019    Procedure: Flexible and Rigid Bronchoscopy, Bronchial Washing, Balloon Dilation;  Surgeon: Wilber Lin MD;  [facility]     BRONCHOSCOPY, DILATE BRONCHUS, STENT BRONCHUS, COMBINED  N/A 6/6/2019    Procedure: Rigid and Flexible Bronchoscopy, Balloon Dilation;  Surgeon: Wilber Lin MD;  [facility]     BRONCHOSCOPY, DILATE BRONCHUS, STENT BRONCHUS, COMBINED N/A 10/11/2019    Procedure: Flexible and Rigid Bronchoscopy, Balloon Dilation, Bronchoalveolar Lagave;  Surgeon: Wilber Lin MD;  [facility]     BRONCHOSCOPY, DILATE BRONCHUS, STENT BRONCHUS, COMBINED N/A 2/19/2021    Procedure: BRONCHOSCOPY, flexible, airway dilation, and bronchial wash;  Surgeon: Wilber Lin MD;  [facility]     BRONCHOSCOPY, DILATE BRONCHUS, STENT BRONCHUS, COMBINED N/A 4/9/2021    Procedure: BRONCHOSCOPY, flexible and rigid, Airway suctioning;  Surgeon: Mati Norris MD;  [facility]     BRONCHOSCOPY, DILATE BRONCHUS, STENT BRONCHUS, COMBINED N/A 10/17/2023    Procedure: RIGID, flexible bronchoscopy with airway dilation;  Surgeon: Preet Patel MD;  [facility]     CV RIGHT HEART CATH MEASUREMENTS RECORDED N/A 3/10/2020    Procedure: CV RIGHT HEART CATH;  Surgeon: Wai Garcia MD;  [facility]     ENT SURGERY      tonsillectomy as a child     ESOPHAGOSCOPY, GASTROSCOPY, DUODENOSCOPY (EGD), COMBINED N/A 10/29/2018    Procedure: COMBINED ESOPHAGOSCOPY, GASTROSCOPY, DUODENOSCOPY (EGD) with biopsies and polypectomy;  Surgeon: Chente Bloom MD;  [facility]     INSERT EXTRACORPORAL MEMBRANE OXYGENATOR ADULT (OUTSIDE OR) N/A 2/27/2018    Procedure: INSERT EXTRACORPORAL MEMBRANE OXYGENATOR ADULT (OUTSIDE OR);  INSERT EXTRACORPORAL MEMBRANE OXYGENATOR ADULT (OUTSIDE OR) ;  Surgeon: Toby Hernandez MD;  [facility]     IR CVC TUNNEL PLACEMENT > 5 YRS OF AGE  10/25/2019     IR DIALYSIS FISTULOGRAM LEFT  3/2/2021     IR DIALYSIS FISTULOGRAM LEFT  11/2/2023     IR DIALYSIS MECH THROMB, PTA  3/2/2021     IR FLUORO 0-1 HOUR  5/7/2021     IR GASTRO JEJUNOSTOMY TUBE PLACEMENT  2/16/2021     IR PARACENTESIS  1/8/2020      IR THORACENTESIS  9/13/2019     IR TRANSCATHETER BIOPSY  1/8/2020     LASER CO2 BRONCHOSCOPY N/A 4/30/2021    Procedure: Flexible and Rigid Bronchoscopy and Tissue Debulking with CO2 Laser Assistance;  Surgeon: Mati Norris MD;  [facility]     LASER CO2 BRONCHOSCOPY N/A 6/11/2021    Procedure: BRONCHOSCOPY, Flexible and Rigid Bronchoscopy, Tissue Debulking with cryo Assistance, airway dilation,;  Surgeon: Mati Norris MD;  [facility]     LASER CO2 BRONCHOSCOPY N/A 9/16/2021    Procedure: BRONCHOSCOPY, flexible and rigid, CO2 Laser Debulking;  Surgeon: Mati Norris MD;  [facility]     LASER CO2 BRONCHOSCOPY N/A 2/11/2022    Procedure: flexible, rigid bronchoscopy, tissue debulking, airway dilation, co2 laser, bronchoalveolar lavage;  Surgeon: Juana Baugh MD;  [facility]     LASER CO2 BRONCHOSCOPY Right 11/17/2023    Procedure: flexible bronchosocopy, tissue/tumor debulking, using CO2 laser, mitomycin application and argon plasma coagulation;  Surgeon: Mati Norris MD;  [facility]     no prior surgery       REMOVE EXTRACORPORAL MEMBRANE OXYGENATOR ADULT N/A 3/3/2018    Procedure: REMOVE EXTRACORPORAL MEMBRANE OXYGENATOR ADULT;  Removal of Right Femoral Venous and Right Axillary Arterial Extracorporeal Membrane Oxygenator;  Surgeon: Toby Hernandez MD;  Location:  OR     TRANSPLANT LUNG RECIPIENT SINGLE X2 Bilateral 3/1/2018    Procedure: TRANSPLANT LUNG RECIPIENT SINGLE X2;  Median Sternotomy, Extracorporeal Membrane Oxygenator, bilateral sequential lung transplant;  Surgeon: Toby Hernandez MD;  Location:  OR           Family History:     Family History   Problem Relation Age of Onset     Hypertension Mother      Arthritis Mother      Pancreatic Cancer Father      Diabetes Father             Social History:     Social History     Socioeconomic History     Marital status:      Spouse name: Not on file     Number of children: Not on file      Years of education: Not on file     Highest education level: Not on file   Occupational History     Not on file   Tobacco Use     Smoking status: Never     Smokeless tobacco: Never   Substance and Sexual Activity     Alcohol use: No     Alcohol/week: 1.0 standard drink of alcohol     Types: 1 Glasses of wine per week     Drug use: No     Sexual activity: Not on file   Other Topics Concern     Parent/sibling w/ CABG, MI or angioplasty before 65F 55M? No   Social History Narrative    3/6/2019 - Lives with . Has three daughters. Four grandchildren (two ). No pets. Travelled previously to [facility]. Has visited [place] several times.      Social Determinants of Health     Financial Resource Strain: Not on file   Food Insecurity: Not on file   Transportation Needs: Not on file   Physical Activity: Not on file   Stress: Not on file   Social Connections: Not on file   Interpersonal Safety: Not on file   Housing Stability: Not on file            Medications:     Current Outpatient Medications   Medication     acetaminophen (TYLENOL) 325 MG tablet     acetylcysteine (MUCOMYST) 10 % nebulizer solution     albuterol (PROVENTIL) (2.5 MG/3ML) 0.083% neb solution     azaTHIOprine (IMURAN) 50 MG tablet     azithromycin (ZITHROMAX) 250 MG tablet     benzonatate (TESSALON) 100 MG capsule     cholecalciferol 50 MCG ( UT) CAPS     fluticasone-salmeterol (ADVAIR) 250-50 MCG/ACT inhaler     guaiFENesin (MUCINEX) 600 MG 12 hr tablet     Magnesium Cl-Calcium Carbonate (SLOW-MAG) 71.5-119 MG TBEC     montelukast (SINGULAIR) 10 MG tablet     pantoprazole (PROTONIX) 40 MG EC tablet     posaconazole (NOXAFIL) 100 MG EC tablet     predniSONE (DELTASONE) 5 MG tablet     sulfamethoxazole-trimethoprim (BACTRIM) 400-80 MG tablet     tacrolimus (GENERIC) 0.5 MG capsule     tacrolimus (GENERIC) 1 MG capsule     [START ON 2023] valGANciclovir (VALCYTE) 450 MG tablet     No current facility-administered medications  "for this visit.            Physical Exam:   BP (!) 164/89   Pulse 100   Resp 17   Ht 1.6 m (5' 3\")   Wt 58.7 kg (129 lb 6.4 oz)   LMP 06/07/2014 (Exact Date)   SpO2 99%   BMI 22.92 kg/m      GENERAL: alert, NAD  HEENT: NCAT, EOMI, no scleral icterus, oral mucosa moist and without lesions  Neck: no cervical or supraclavicular adenopathy  Lungs: poor airflow, scattered rhonchi bilaterally   CV: RRR, S1S2, no murmurs noted  Abdomen: normoactive BS, soft  Lymph: no edema  Neuro: AAO X 3, CN 2-12 grossly intact  Psychiatric: normal affect, good eye contact  Skin: no rash, jaundice or lesions on limited exam         Data:   All laboratory and imaging data reviewed.      Recent Results (from the past 168 hour(s))   Magnesium    Collection Time: 11/21/23  7:52 AM   Result Value Ref Range    Magnesium 1.8 1.7 - 2.3 mg/dL   Comprehensive metabolic panel    Collection Time: 11/21/23  7:52 AM   Result Value Ref Range    Sodium 140 135 - 145 mmol/L    Potassium 3.6 3.4 - 5.3 mmol/L    Carbon Dioxide (CO2) 34 (H) 22 - 29 mmol/L    Anion Gap 8 7 - 15 mmol/L    Urea Nitrogen 18.2 8.0 - 23.0 mg/dL    Creatinine 2.60 (H) 0.51 - 0.95 mg/dL    GFR Estimate 20 (L) >60 mL/min/1.73m2    Calcium 9.2 8.8 - 10.2 mg/dL    Chloride 98 98 - 107 mmol/L    Glucose 108 (H) 70 - 99 mg/dL    Alkaline Phosphatase 208 (H) 40 - 150 U/L    AST 27 0 - 45 U/L    ALT 23 0 - 50 U/L    Protein Total 7.3 6.4 - 8.3 g/dL    Albumin 3.8 3.5 - 5.2 g/dL    Bilirubin Total 0.8 <=1.2 mg/dL   CBC with platelets and differential    Collection Time: 11/21/23  7:52 AM   Result Value Ref Range    WBC Count 1.4 (L) 4.0 - 11.0 10e3/uL    RBC Count 2.30 (L) 3.80 - 5.20 10e6/uL    Hemoglobin 8.2 (L) 11.7 - 15.7 g/dL    Hematocrit 26.0 (L) 35.0 - 47.0 %     (H) 78 - 100 fL    MCH 35.7 (H) 26.5 - 33.0 pg    MCHC 31.5 31.5 - 36.5 g/dL    RDW 23.3 (H) 10.0 - 15.0 %    Platelet Count 120 (L) 150 - 450 10e3/uL    % Neutrophils 53 %    % Lymphocytes 29 %    % " Monocytes 14 %    % Eosinophils 2 %    % Basophils 1 %    % Immature Granulocytes 1 %    NRBCs per 100 WBC 0 <1 /100    Absolute Neutrophils 0.8 (L) 1.6 - 8.3 10e3/uL    Absolute Lymphocytes 0.4 (L) 0.8 - 5.3 10e3/uL    Absolute Monocytes 0.2 0.0 - 1.3 10e3/uL    Absolute Eosinophils 0.0 0.0 - 0.7 10e3/uL    Absolute Basophils 0.0 0.0 - 0.2 10e3/uL    Absolute Immature Granulocytes 0.0 <=0.4 10e3/uL    Absolute NRBCs 0.0 10e3/uL   General PFT Lab (Please always keep checked)    Collection Time: 11/21/23  8:28 AM   Result Value Ref Range    FVC-Pred 2.89 L    FVC-Pre 0.85 L    FVC-%Pred-Pre 29 %    FEV1-Pre 0.78 L    FEV1-%Pred-Pre 33 %    FEV1FVC-Pred 80 %    FEV1FVC-Pre 91 %    FEFMax-Pred 6.24 L/sec    FEFMax-Pre 2.11 L/sec    FEFMax-%Pred-Pre 33 %    FEF2575-Pred 2.12 L/sec    FEF2575-Pre 0.98 L/sec    HLR6867-%Pred-Pre 46 %    ExpTime-Pre 4.06 sec    FIFMax-Pre 2.20 L/sec    FEV1FEV6-Pred 81 %    FEV1FEV6-Pre 91 %     PFT interpretation:  Maneuver: valid and meets ATS guidelines  Severe obstruction based on Z score  Compared to prior: FEV1 of 0.78 is 90 ml below prior  The decrease in FVC may represent air trapping v. restrictive physiology.  Lung volumes would be necessary to determine.      Again, thank you for allowing me to participate in the care of your patient.        Sincerely,        Ame Chow PA-C

## 2023-11-21 NOTE — LETTER
PHYSICIAN ORDERS      DATE & TIME ISSUED: 2023 11:17 AM  PATIENT NAME: Kecia Blue   : 1960     [facility] MR# [if applicable]: [id number]     DIAGNOSIS:  Lung Transplant  Z94.2 and Shortness of breath R06.02    Please complete overnight oximetry on 2 L via NC.    Any questions please call: Airam [phone]    Please fax these results to [phone].        Ame Chow PA-C

## 2023-11-21 NOTE — PROGRESS NOTES
Plan for pt to start oral steroid pulse.  Pt has glucometer supplies at home and will monitor blood sugar while on steroid pulse as well.  Instructions sent via [employer] message.  Pt will continue to take valcyte during steroid pulse and monitor CMV weekly.      Overnight O2 on 2 L order faxed to  [facility] per pt request.      Plan for tx follow up in 3-4 weeks, pending coordination of palliative care visit around similar timeframe.

## 2023-11-21 NOTE — LETTER
PHYSICIAN ORDERS      DATE & TIME ISSUED: 2023 9:52 AM  PATIENT NAME: Kecia Blue   : 1960     [facility] MR# [if applicable]: [id number]     DIAGNOSIS:  Lung Transplant  Z94.2    Patient needs a 6 minute walk with oxygen titration. She is currently using 2L with activity, but may need more so need to determine that. Hoping to have this done ASAP.     Any questions please call: Mikayla [phone]    Please fax these results to [phone].        Ame Chow PA-C

## 2023-11-22 NOTE — TELEPHONE ENCOUNTER
Prior Authorization Approval    Medication: GRANIX 300 MCG/ML SC SOLN  Authorization Effective Date: 8/24/2023  Authorization Expiration Date: 5/22/2024  Approved Dose/Quantity: 1/1  Reference #: [id number]   Insurance Company: [employer] - Phone [phone] Fax [phone]  Expected CoPay: $12.15  CoPay Card Available:      Financial Assistance Needed: No  Which Pharmacy is filling the prescription:    Pharmacy Notified: Yes   Patient Notified: Yes

## 2023-11-24 NOTE — LETTER
PHYSICIAN ORDERS      DATE & TIME ISSUED: 2023 9:30 AM  PATIENT NAME: Kecia Blue   : 1960     [facility] MR# [if applicable]: [id number]     DIAGNOSIS:  Lung Transplant  Z94.2    Patient needs an urgent PET-CT scan whole body.      Clinical information for interpreting provider: patient s/p lung transplant now with significant elevation in EBV (1 million) and neutropenia, concern for PTLD.     Any questions please call: Mikayla [phone]    Please fax these results to [phone].                   Yonny Orona MD      PATIENT DEMOGRAPHICS:   Name: Kecia Blue MRN: [id number]   Address: [address] [place] [address] Dr Chavez Sex: Female   City/St/Zip: [place] [address] : 1960   Home Phone: [phone] Work Phone:     [facility]: [place] Cell Phone: [phone]       EMERGENCY CONTACT:  Contact Name: Ranjan Blue Relationship: Spouse   Home Phone: [phone] Work Phone:         Cell Phone: [phone]      GUARANTOR INFORMATION: Relationship to Patient: Self  Name: Kecia Blue       Address: [address] [place] [address] Dr Chavez  Account Type: Personal/family   City/St/[place] [address] Employer:     Home Phone: [phone] Work Phone:          COVERAGE INFORMATION:  Primary Payor: Medicare Plan: Medicare   Subscriber: Kecia Blue Group #:     Subscriber Sex: Female       Subscriber : 1960 Patient Rel'ship: Self   Subscriber Effective Date:   Member Effective Date: 10/1/2021    Subscriber ID [id number] Member ID: [id number]      Secondary Payor: [facility] Plan: [facility]   Subscriber: Kecia Blue Group #: [id number]   Subscriber Sex: Female       Subscriber : 1960 Patient Rel'ship: Self   Subscriber Effective Date:   Member Effective Date: 2022   Subscriber ID [id number] Member ID: [id number]      PROVIDER INFORMATION:  Referring Physician:   Referring Address:     Referring Phone: N/A Referring Fax:     Primary  Physician: Ame Chow Primary Address: [address] [place] [address]   Primary Phone: [phone] Primary Fax: [phone]

## 2023-11-24 NOTE — LETTER
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   If authorization can not be obtained in time to do this scan on 23, then patient should get a CT chest, abdomen, pelvis with IV contrast on 23 instead.     Clinic information for interpreting provider: patient s/p lung transplant now with significant elevation in EBV (1 million) and neutropenia, concern for PTLD.     Any questions please call: Mikayla     Please fax these results to [phone].         Yonny Orona MD

## 2023-11-24 NOTE — TELEPHONE ENCOUNTER
EBV 1,088,072 on 23.     Per Dr. Orona:    EBV with significantly high copies compared to last levels.Leukopenia. CT chest recently with adenopathy-axillary. Suspicious for PTLD. Please do the followin. Stop AZA   2. Needs urgent PET-CT scan whole body. If delay CT chest abdomen pelvis with IV contrast. If delay in getting this done, may need to get patient admitted for all evaluation and Hematology consult.     Spoke with patient, she stopped AZA. Granix should be arrive by Tuesday per speciality pharmacy. Recheck labs Wednesday. Dr. Orona would prefer a PET scan early next week, so order sent to [facility]. Pt does not need to come to the ER unless she is more SOB. Pt feels at baseline today, no worse.

## 2023-11-24 NOTE — TELEPHONE ENCOUNTER
11/23/20233 - Received call from Dr Orona with request to contact patient regarding elevated EBV.     Patient also neutropenic, Dr Orona requesting to ask patient if she had received Granix Rx. Patient reported she had never received it, no indication in her chart.    Per Dr Orona patient was to stop her Imuran. If symptoms were worse than what she reported in clinic, she was to go to ER for workup (imaging, possible admit for bronch and IV medications). Patient reported she did not have new symptoms and did not feel worse, but symptoms had not improved. Due to holiday plan made for patient to wait until 11/24/2023 for post transplant coordinator to follow up with more detailed plan of care.     Patient reported she had dialysis on 11/24/2023, shoul dbe done after 130PM and would like to complete imaging afterwards if possible.     11/24/2023 Discussed plan with post transplant coordinator. Plan made for post transplant coordinator to follow up today and arrange for CT Chest with contrast and possible PET Scan (requires PA).

## 2023-11-27 NOTE — TELEPHONE ENCOUNTER
Patient Call: General  Route to LPN    Reason for call: Zoraida from [facility] imaging dept calling and has questions regarding Pet Scan orders they received.    Call back needed? Yes    Return Call Needed  Same as documented in contacts section  When to return call?: Same day: Route High Priority

## 2023-11-27 NOTE — LETTER
PHYSICIAN ORDERS      DATE & TIME ISSUED: 2023 3:16 PM  PATIENT NAME: Kecia Blue   : 1960     [facility] MR# [if applicable]: [id number]       DIAGNOSIS:  Lung Transplant  Z94.2, R91.8 Other nonspecific abnormal finding of lung field    Patient needs an urgent PET-CT scan whole body.      Clinical information for interpreting provider: patient s/p lung transplant now with significant elevation in EBV (1 million), neutropenia, CT chest recently with adenopathy-axillary, concern for PTLD.      Any questions please call: Mikayla [phone]     Please fax these results to [phone].        Yonny Orona MD

## 2023-11-27 NOTE — TELEPHONE ENCOUNTER
Updated Dx code for PET order and faxed back to [facility] imaging scheduling. Message sent to patient that this has been done.

## 2023-11-28 NOTE — PROGRESS NOTES
[facility]: Cancer Care                                                                   Hem/Onc  Referral reviewed  Oncology/Hematology Adult Referral [[id number]]  Ordering user: Mikayla Latham RN 11/28/23 1020 Ordering provider: Ame Chow PA-C     Referral Details  Referred By  Referred To   Ame Chow PA-C   [address] [address]   Phone: [phone]   Fax: [phone]    Diagnoses: Lung replaced by transplant (H)   Order: Oncology/Hematology Adult Referral    [facility]        Comment: Please schedule with Alex Alberto or Deann Smith     Order Questions  Question Answer   My Clinical Question Is: pt with EBV viremia (1 million), adenopathy, and neutropenia, concern for PTLD. getting PET scan on 11/30/23 [facility]        ASSESSMENT      Clinical History (per Nurse review of records provided):    61 yo with hx of bilat lung transplant referred as above. PET ordered by SOT team, scheduled locally at [facility] as above  She lives in [place], [place] ~ 2.5 hrs from [facility]    Records Location: [facility] Everywhere   Pertinent labs -- BOOKMARKED  Pertinent imaging -- BOOKMARKED  Referring provider note(s)-- BOOKMARKED    INTERVENTION(S)                                                      -OUTGOING CALL to pt:   Introduced my role as nurse navigator with [facility] Hematology/Oncology dept and that we have recd the referral to see oncology from SOT team.  Identity verified. Pt confirms they are aware of the referral and ready to schedule, video offered with understanding that if the PET shows disease that needs to be treated she will need to come to [facility] in person at later date(s) for PE and f2f office visits with our dept.  Pt voiced understanding of above instructions and information and denied further questions and was    warm-transferred to Oncology Patient Access rep to schedule the consultation.    -Referral Triage Scheduling Instructions added to Hem/Onc  referral for Patient Access rep    PLAN                                                      Hem/Onc consult     Records Needed: PET SCAN report and images when available: 11/30 -- [facility]    Additional testing needed prior to consult:     See records team's Pre-Visit encounter documentation for additional records retrieval information.    Rona Sandhu, RN, BSN, OCN  Hematology/Oncology New Patient Nurse Navigator   [facility]  [phone]

## 2023-11-29 NOTE — LETTER
11/29/2023         RE: Kecia Blue  [address] [place] [address]        Dear Colleague,    Thank you for referring your patient, Kecia Blue, to the [facility] [place]. Please see a copy of my visit note below.    Attending Physician Attestation    I, Preet Alberto MD, have personally seen this patient independently of the fellow, Dr. Onofre. I have personally reviewed vital signs, medications, labs, imaging, and hospital course. I agree with their findings and plan of care as documented in the note with the following additions/exceptions:    Key findings:  EBV log 6 in the setting of acute illness. She feels well overall. On 2-3 L of NC. Energy is low but seems to be recovering. PET completed at the time of this note and no lymphadenopathy. We will set her up to be seen in person and repeat EBV and give 1 dose of Ritux is she looks/feels well.    Preet Alberto MD  Date of Service (when I saw the patient): 11/30/2023     60 minutes spent on the date of the encounter doing chart review, interpretation of results, patient visit, documentation and coordination of care.      Virtual Visit Details    Type of service:  Video Visit   Originating Location (pt. Location): Home    Distant Location (provider location):  On-site  Platform used for Video Visit: Shaheen    Joined the call at 11/29/2023, 3:31:11 pm.  Left the call at 11/29/2023, 3:50:08 pm.  You were on the call for 18 minutes 57 seconds .    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 She is on indefinite posaconazole, valgancyclovir.   She is also on dialysis for ESRD.  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 CT chest on 10/24 showed worsening bilateral consolidation due to infection.BAL showed actinomyces.  Pulmonary transplant team were consulted and they recommended Azathioprine 75 mg qday, azithromycin 250 mg qday, tacrolimus, prednisone 5mg qday and bactrim PJP prophylaxis.  Transplant ID was consulted and recommended vancomycin , minocycline, zosyn.  Patient developed diffuse pruritic erythema, left breast rash, macrocytic anemia as well as EBV and CNV viremia. ID continued patient on Valgancyclovir.    Interval History:  We virtually met with Kecia. She is on 2 lit oxygen at her baseline. She has good energy level as well as normal appetite. She does not endorse any fever, soar throat, chills , weight loss. She has not noticed any lymphadenopathy. She denies shortness of breath, diarrhea, nausea or vomiting.    ROS: 10 point ROS neg other than the symptoms noted above in the HPI.         CBC RESULTS:   Recent Labs   Lab Test 11/21/23  0752   WBC 1.4*   RBC 2.30*   HGB 8.2*   HCT 26.0*   *   MCH 35.7*   MCHC 31.5   RDW 23.3*   *      Last Comprehensive Metabolic Panel:  Lab Results   Component Value Date     11/21/2023    POTASSIUM 3.6 11/21/2023    CHLORIDE 100 11/29/2023    CO2 34 (H) 11/21/2023    ANIONGAP 8 11/21/2023     (H) 11/21/2023    BUN 18.2 11/21/2023    CR 2.60 (H) 11/21/2023    GFRESTIMATED 20 (L) 11/21/2023    CHELSEY 9.2 11/21/2023     CT 11/21/23:    1. Increasing mixed groundglass and interstitial opacities and focal  consolidation in the right lower lobe, and to a lesser extent the  right upper lobe and left lower lobe. These findings may represent  infection (viral, PJP), recurrence of interstitial lung disease,  restrictive allograft syndrome/CLAD.  2. Chronic appearing pulmonary embolism with calcification in the  right lower lobe pulmonary artery. PA enlargement suggestive of  pulmonary hypertension  possibly CTEPH .  3. Upper left anterior chest wall and breast subcutaneous edema and  skin thickening. Nonspecific.  4. Stenosis of the bronchus intermedius.  5. Loculated anterior superior left pleural effusion. Chronic pleural  calcification left base secondary to prior empyema.  6. Gallbladder wall thickening and cholelithiasis may represent  chronic cholecystitis.     Assessment and Plan:    EBV viremia: EBV DNA [id number] on 11/21/2023 . CT scan shows ncreasing mixed groundglass and interstitial opacities and focal  consolidation in the right lower lobe most likely infectious etiology. Patient is on appropriate antibiotic therapy under transplant and ID team. She is relatively asymptomatic at the time of our virtual visit with her.   She is scheduled for a PET scan. If there is lymphadenopathy on PET, next step would be to do LN biopsy and flow cytometry. Decreasing the immunosuppressive medication if possible is recommended in case of proven PTLD. If CD20 PTLD, next step would be to use Rituximab 375 mg/m2 weekly X4 weeks. If she is found to have CD20 negative PTLD, we have a trial ( EBV antiviral therapy) that we can potentially enrol her at that time.  Based on the type of PTLD and if it is CD20 or negative, we will decide about potential next step.    Plan:  We will revisit with her after PET and potential LN biopsy.        Preet Alberto MD

## 2023-11-29 NOTE — NURSING NOTE
Patient confirms medications and allergies are accurate via patients echeck in completion, and or denies any changes since last reviewed/verified.     Is the patient currently in the state of [place]? YES    Visit mode:VIDEO    If the visit is dropped, the patient can be reconnected by: VIDEO VISIT: Text to cell phone:   Telephone Information:   Mobile [phone]       Will anyone else be joining the visit? NO  (If patient encounters technical issues they should call [phone])    How would you like to obtain your AVS? MyChart    Are changes needed to the allergy or medication list? No    Reason for visit: RECHECK    Nancy JAVED

## 2023-11-29 NOTE — TELEPHONE ENCOUNTER
RECORDS STATUS - ALL OTHER DIAGNOSIS      RECORDS RECEIVED FROM: Epic   DATE RECEIVED:    NOTES STATUS DETAILS   OFFICE NOTE from referring provider Epic 11/21/23: Ame Chow PA-C   MEDICATION LIST [facility]    LABS     PATHOLOGY REPORTS Report in [facility] Cytology:  11/1/23: [id number]   8/17/23: [id number]   2/11/22: [id number]     Surg Path:  8/13/20: [id number]  4/10/18: [id number]    3/1/18: [id number]    ANYTHING RELATED TO DIAGNOSIS Epic 11/21/23   IMAGING (NEED IMAGES & REPORT)     CT SCANS PACS 11/21/23-2/22/18: CT Chest  3/8/18: CT Chest Abd Pel   MRI PACS 10/31/23-10/10/19: MR Chest   XRAYS PACS 11/4/23-8/12/16: XR Chest   NM PACS 2/19/18: NM Lung Scan   PET PACS 3/18/16: PET Whole Body

## 2023-11-29 NOTE — PROGRESS NOTES
Attending Physician Attestation    I, Preet Alberto MD, have personally seen this patient independently of the fellow, Dr. Onofre. I have personally reviewed vital signs, medications, labs, imaging, and hospital course. I agree with their findings and plan of care as documented in the note with the following additions/exceptions:    Key findings:  EBV log 6 in the setting of acute illness. She feels well overall. On 2-3 L of NC. Energy is low but seems to be recovering. PET completed at the time of this note and no lymphadenopathy. We will set her up to be seen in person and repeat EBV and give 1 dose of Ritux is she looks/feels well.    Preet Alberto MD  Date of Service (when I saw the patient): 11/30/2023     60 minutes spent on the date of the encounter doing chart review, interpretation of results, patient visit, documentation and coordination of care.      Virtual Visit Details    Type of service:  Video Visit   Originating Location (pt. Location): Home    Distant Location (provider location):  On-site  Platform used for Video Visit: Shaheen    Joined the call at 11/29/2023, 3:31:11 pm.  Left the call at 11/29/2023, 3:50:08 pm.  You were on the call for 18 minutes 57 seconds .    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 She is on indefinite posaconazole, valgancyclovir.   She is also on dialysis for ESRD.  She was recently admitted for shortness of breath and hypotension and found to have right mainstem bronchial stenosis s/p stent placement and bronchoplasties. CT chest on 10/24 showed worsening bilateral consolidation due to infection.BAL showed actinomyces.  Pulmonary transplant team were consulted and they recommended Azathioprine 75 mg qday, azithromycin 250 mg qday, tacrolimus,  prednisone 5mg qday and bactrim PJP prophylaxis.  Transplant ID was consulted and recommended vancomycin , minocycline, zosyn.  Patient developed diffuse pruritic erythema, left breast rash, macrocytic anemia as well as EBV and CNV viremia. ID continued patient on Valgancyclovir.    Interval History:  We virtually met with Kecia. She is on 2 lit oxygen at her baseline. She has good energy level as well as normal appetite. She does not endorse any fever, soar throat, chills , weight loss. She has not noticed any lymphadenopathy. She denies shortness of breath, diarrhea, nausea or vomiting.    ROS: 10 point ROS neg other than the symptoms noted above in the HPI.         CBC RESULTS:   Recent Labs   Lab Test 11/21/23  0752   WBC 1.4*   RBC 2.30*   HGB 8.2*   HCT 26.0*   *   MCH 35.7*   MCHC 31.5   RDW 23.3*   *      Last Comprehensive Metabolic Panel:  Lab Results   Component Value Date     11/21/2023    POTASSIUM 3.6 11/21/2023    CHLORIDE 100 11/29/2023    CO2 34 (H) 11/21/2023    ANIONGAP 8 11/21/2023     (H) 11/21/2023    BUN 18.2 11/21/2023    CR 2.60 (H) 11/21/2023    GFRESTIMATED 20 (L) 11/21/2023    CHELSEY 9.2 11/21/2023     CT 11/21/23:    1. Increasing mixed groundglass and interstitial opacities and focal  consolidation in the right lower lobe, and to a lesser extent the  right upper lobe and left lower lobe. These findings may represent  infection (viral, PJP), recurrence of interstitial lung disease,  restrictive allograft syndrome/CLAD.  2. Chronic appearing pulmonary embolism with calcification in the  right lower lobe pulmonary artery. PA enlargement suggestive of  pulmonary hypertension possibly CTEPH .  3. Upper left anterior chest wall and breast subcutaneous edema and  skin thickening. Nonspecific.  4. Stenosis of the bronchus intermedius.  5. Loculated anterior superior left pleural effusion. Chronic pleural  calcification left base secondary to prior empyema.  6.  Gallbladder wall thickening and cholelithiasis may represent  chronic cholecystitis.     Assessment and Plan:    EBV viremia: EBV DNA [id number] on 11/21/2023 . CT scan shows ncreasing mixed groundglass and interstitial opacities and focal  consolidation in the right lower lobe most likely infectious etiology. Patient is on appropriate antibiotic therapy under transplant and ID team. She is relatively asymptomatic at the time of our virtual visit with her.   She is scheduled for a PET scan. If there is lymphadenopathy on PET, next step would be to do LN biopsy and flow cytometry. Decreasing the immunosuppressive medication if possible is recommended in case of proven PTLD. If CD20 PTLD, next step would be to use Rituximab 375 mg/m2 weekly X4 weeks. If she is found to have CD20 negative PTLD, we have a trial ( EBV antiviral therapy) that we can potentially enrol her at that time.  Based on the type of PTLD and if it is CD20 or negative, we will decide about potential next step.    Plan:  We will revisit with her after PET and potential LN biopsy.

## 2023-12-01 NOTE — PROGRESS NOTES
[facility]: Cancer Care Plan of Care Education Note                                    Discussion with Patient:                                                      Called patient after office visit/consult with Dr. Alberto. Introduced self as RN Care Coordinator. Went over contact information for [facility].      Informed her that Dr. Alberto reviewed PET-- didn't show any cancer, though she still has consolidation in her lungs (this has been known)    Plan to give Rituxan x1 for high EBV. Discussed reason for this. Offered HL due to patient living 2 hours away, though she declined.    Assessment:                                                      Assessment completed with:: Patient    Plan of Care Education   Yearly learning assessment completed?: Yes (see Education tab)  Diagnosis:: High EBV-- concern for PTLD-- Md review PET from 11/30 which showed no evidence of cancer. Would like to give Rituxan x1 for high EBV  Does patient understand diagnosis?: Yes  Tx plan/regimen:: Rituxan x1  Does patient understand treatment plan/regimen?: Yes  Preparing for treatment:: Reviewed treatment preparation information with patient (vascular access, day of chemo, visitor policy, what to bring, etc.) (visitor policy, location, rational for treatment, etc)  Vascular access education provided for:: Peripheral IV  Side effect education:: Fatigue (infusion reaction side effects from rituxan)  Safety/self care at home reviewed with patient:: Yes  Coping - concerns/fears reviewed with patient:: Yes  Plan of Care:: Lab appointment;MD follow-up appointment;Treatment schedule (labs, MD visit, an rituxan on 12/13)  Reasons for deferring treatment reviewed with patient:: No    Evaluation of Learning  Patient Education Provided: Yes  Readiness:: Eager  Method:: Explanation (will provide written literature via [employer] and in person on 12/13)  Response:: Verbalizes understanding      Intervention/Education provided  during outreach:                                                         Patient to follow up as scheduled at next appt  Patient to call/[employer] message with updates    Ciara Willoughby, MSN, RN, OCN   RN Care Coordinator   [facility] [address] [address] [phone]

## 2023-12-07 PROBLEM — D47.Z1: Status: ACTIVE | Noted: 2023-01-01

## 2023-12-11 NOTE — PLAN OF CARE
Problem: Patient Care Overview  Goal: Plan of Care/Patient Progress Review  DATE:  3/4/2018   TIME OF RECEIPT FROM LAB: 0015  LAB TEST:  3/1/18 Bronchial culture from donor lungs  LAB VALUE: donor lung culture from 3/1/18  with  MRSA positive result  RESULTS GIVEN WITH READ-BACK TO (PROVIDER):  CVTS team   TIME LAB VALUE REPORTED TO PROVIDER:   0020    Patient will continue to be monitored and assessed. Please see MAR, flow sheets, and remainder of chart for further details and updated orders .          Pt is scheduled for fasting labs on 12/14 but there are no orders in the system. Please place lab orders.

## 2023-12-13 NOTE — NURSING NOTE
Chief Complaint   Patient presents with    Blood Draw     Labs drawn via piv by rn in lab. VS taken.     Labs drawn from PIV placed by RN. Line flushed with saline. Vitals taken. Pt checked in for appointment(s).    Emanuel Boateng RN

## 2023-12-13 NOTE — PROGRESS NOTES
Infusion Nursing Note:  Kecia VILLAFANA Satrosalia presents today for Cycle 1 Day 1 Rituximab-abbs.    Patient seen by provider today: Yes: Dr. Alberto   present during visit today: Not Applicable.    Note: Pt presents to infusion today feeling well. Pt offers no new concerns following visit with provider today.    Intravenous Access:  Peripheral IV placed.    Treatment Conditions:  Lab Results   Component Value Date    HGB 9.6 (L) 12/13/2023    WBC 5.4 12/13/2023    ANEU 10.7 (H) 11/04/2023    ANEUTAUTO 4.1 12/13/2023     (L) 12/13/2023        Lab Results   Component Value Date     12/13/2023    POTASSIUM 4.3 12/13/2023    MAG 1.8 11/21/2023    CR 4.48 (H) 12/13/2023    CHELSEY 7.9 (L) 12/13/2023    BILITOTAL 0.8 11/21/2023    ALBUMIN 3.8 11/21/2023    ALT 23 11/21/2023    AST 27 11/21/2023     Results reviewed, labs MET treatment parameters, ok to proceed with treatment.    Post Infusion Assessment:  Patient tolerated infusion without incident.  Blood return noted pre and post infusion.  Site patent and intact, free from redness, edema or discomfort.  No evidence of extravasations.  Access discontinued per protocol.     Discharge Plan:   Patient declined prescription refills.  Discharge instructions reviewed with: Patient and Family.  Patient and/or family verbalized understanding of discharge instructions and all questions answered.  Copy of AVS reviewed with patient and/or family.  Active request with scheduling for 12/29/23 for next appointment.  Patient discharged in stable condition accompanied by: self and .  Departure Mode: Wheelchair.    Neyda Reese, RN

## 2023-12-13 NOTE — NURSING NOTE
"Oncology Rooming Note    December 13, 2023 8:35 AM   Kecia Blue is a 63 year old female who presents for:    Chief Complaint   Patient presents with    Blood Draw     Labs drawn via piv by rn in lab. VS taken.    Oncology Clinic Visit     Lymphoproliferative disorder, post transplant      Initial Vitals: BP (!) 140/74   Pulse 102   Temp 97.8  F (36.6  C)   Resp 16   Wt 58.4 kg (128 lb 11.2 oz)   LMP 06/07/2014 (Exact Date)   SpO2 95%   PF (!) 2 L/min   BMI 22.80 kg/m   Estimated body mass index is 22.8 kg/m  as calculated from the following:    Height as of 11/29/23: 1.6 m (5' 3\").    Weight as of this encounter: 58.4 kg (128 lb 11.2 oz). Body surface area is 1.61 meters squared.  No Pain (0) Comment: Data Unavailable   Patient's last menstrual period was 06/07/2014 (exact date).  Allergies reviewed: Yes  Medications reviewed: Yes    Medications: Medication refills not needed today.  Pharmacy name entered into [facility]:    [facility] - [place], [place] - [phone]  [facility] - [place], [place] - [address]  [facility] [place], [place] - [address]  [id number] - [place] - [address] [facility] & [address] [place] [address] [id number] - [place] - [address] [place] [address]    Clinical concerns:        Elke Valerio              "

## 2023-12-13 NOTE — LETTER
12/13/2023         RE: Kecia Blue  [address] [place] [facility] [address]        Dear Colleague,    Thank you for referring your patient, Kecia Blue, to the [facility] [place]. Please see a copy of my visit note below.    Date: Dec 13, 2023    Chief Complaint: PTLD evaluation    History Of Present Illness:    64 yo with history of bilateral lung transplant in 2018 with several complications, more recently, admitted with bronchial stenosis. She was found to have an elevated EBV level. We discussed last week and PET scan didn't show any masses. We meet to discuss. She reports that she continues to feel well. Energy is stable. She is still on oxygen. No lumps or bumps.     ROS: 14-point ROS is negative unless otherwise stated in HPI.    Oncology History from Prior Notes:  PET 11/30/23 negative      Medications:  Current Outpatient Medications   Medication    acetaminophen (TYLENOL) 325 MG tablet    acetylcysteine (MUCOMYST) 10 % nebulizer solution    albuterol (PROVENTIL) (2.5 MG/3ML) 0.083% neb solution    azaTHIOprine (IMURAN) 50 MG tablet    azithromycin (ZITHROMAX) 250 MG tablet    benzonatate (TESSALON) 100 MG capsule    fluticasone-salmeterol (ADVAIR) 250-50 MCG/ACT inhaler    guaiFENesin (MUCINEX) 600 MG 12 hr tablet    Magnesium Cl-Calcium Carbonate (SLOW-MAG) 71.5-119 MG TBEC    montelukast (SINGULAIR) 10 MG tablet    pantoprazole (PROTONIX) 40 MG EC tablet    posaconazole (NOXAFIL) 100 MG EC tablet    sulfamethoxazole-trimethoprim (BACTRIM) 400-80 MG tablet    valGANciclovir (VALCYTE) 450 MG tablet    cholecalciferol 50 MCG (2000 UT) CAPS    predniSONE (DELTASONE) 5 MG tablet    tacrolimus (GENERIC) 0.5 MG capsule     No current facility-administered medications for this visit.     Updated PMH:  Past Medical History:   Diagnosis Date    Acute on chronic respiratory failure with hypoxia (H) 02/21/2018    Anisocoria     Antisynthetase  syndrome (H24) 2014    Anxiety     Aspergillus (H)     Aspergillus pneumonia (H) 11/20/2020    Benign essential hypertension     C. difficile colitis     Cytomegalovirus (CMV) viremia (H)     Dermatomyositis (H)     Dysplasia of cervix, low grade (ESTRADA 1)     EBV (Franklin-Barr virus) viremia     ESRD (end stage renal disease) on dialysis (H)     ILD (interstitial lung disease) (H)     Lung replaced by transplant (H)     Osteopenia     Paroxysmal atrial fibrillation (H)     Pneumocystis jiroveci pneumonia (H)     PONV (postoperative nausea and vomiting)     Post-menopause     Pulmonary hypertension (H)     Raynaud's disease     Seronegative rheumatoid arthritis (H)      Updated PSH:  Past Surgical History:   Procedure Laterality Date    BRONCHOSCOPY (RIGID OR FLEXIBLE), DIAGNOSTIC N/A 4/10/2018    Procedure: COMBINED BRONCHOSCOPY (RIGID OR FLEXIBLE), LAVAGE;;  Surgeon: Mariposa Donohue MD;  [facility]    BRONCHOSCOPY (RIGID OR FLEXIBLE), DIAGNOSTIC N/A 12/23/2020    Procedure: BRONCHOSCOPY, WITH BRONCHOALVEOLAR LAVAGE;  Surgeon: Uri Bass MD;  [facility]    BRONCHOSCOPY (RIGID OR FLEXIBLE), DIAGNOSTIC N/A 5/26/2022    Procedure: BRONCHOSCOPY, WITH BRONCHOALVEOLAR LAVAGE;  Surgeon: Uri Bass MD;  [facility]    BRONCHOSCOPY (RIGID OR FLEXIBLE), DIAGNOSTIC N/A 8/17/2023    Procedure: BRONCHOSCOPY, WITH BRONCHOALVEOLAR LAVAGE;  Surgeon: Esau Bruno MD;  [facility]    BRONCHOSCOPY (RIGID OR FLEXIBLE), DIAGNOSTIC N/A 11/1/2023    Procedure: BRONCHOSCOPY, WITH BRONCHOALVEOLAR LAVAGE;  Surgeon: Beto Magaña DO;  [facility]    BRONCHOSCOPY (RIGID OR FLEXIBLE), DILATE BRONCHUS / TRACHEA N/A 10/11/2018    Procedure: BRONCHOSCOPY (RIGID OR FLEXIBLE), DILATE BRONCHUS / TRACHEA;  Flexible And Rigid Bronchoscopy and Dilation;  Surgeon: Wilber Lin MD;  [facility]    BRONCHOSCOPY (RIGID OR FLEXIBLE), DILATE BRONCHUS / TRACHEA N/A 11/1/2023    Procedure:  Bronchoscopy (Rigid Or Flexible), Dilate Bronchus / Trachea;  Surgeon: Beto Magaña DO;  [facility]    BRONCHOSCOPY FLEXIBLE N/A 3/13/2018    Procedure: BRONCHOSCOPY FLEXIBLE;  Flexible Bronchoscopy ;  Surgeon: Gissell Sanchez MD;  [facility]    BRONCHOSCOPY FLEXIBLE N/A 5/9/2018    Procedure: BRONCHOSCOPY FLEXIBLE;;  Surgeon: Wilber Lin MD;  [facility]    BRONCHOSCOPY FLEXIBLE AND RIGID N/A 9/10/2018    Procedure: BRONCHOSCOPY FLEXIBLE AND RIGID;  Flexible and Rigid Bronchoscopy with Balloon Dilation, tissue debulking with cryo, and Right mainstem bronchus stent placement;  Surgeon: Wilber Lin MD;  [facility]    BRONCHOSCOPY RIGID N/A 6/6/2018    Procedure: BRONCHOSCOPY RIGID;;  Surgeon: Lopez Macias MD;  [facility]    BRONCHOSCOPY, DILATE BRONCHUS, STENT BRONCHUS, COMBINED N/A 6/11/2018    Procedure: COMBINED BRONCHOSCOPY, DILATE BRONCHUS, STENT BRONCHUS;  Flexible Bronchoscopy, Balloon Dilation, Bronchial Washings;  Surgeon: Wilber Lin MD;  [facility]    BRONCHOSCOPY, DILATE BRONCHUS, STENT BRONCHUS, COMBINED Right 7/10/2018    Procedure: COMBINED BRONCHOSCOPY, DILATE BRONCHUS, STENT BRONCHUS;  Flexible Bronchoscopy, Balloon Dilation, Bronchial Washings  ;  Surgeon: Wilber Lin MD;  [facility]    BRONCHOSCOPY, DILATE BRONCHUS, STENT BRONCHUS, COMBINED N/A 8/2/2018    Procedure: COMBINED BRONCHOSCOPY, DILATE BRONCHUS, STENT BRONCHUS;  Flexible Bronchoscopy, Bronchial Washings, Balloon Dilation;  Surgeon: Wilber Lin MD;  [facility]    BRONCHOSCOPY, DILATE BRONCHUS, STENT BRONCHUS, COMBINED N/A 8/20/2018    Procedure: COMBINED BRONCHOSCOPY, DILATE BRONCHUS, STENT BRONCHUS;  Flexible Bronchoscopy, Balloon Dilation;  Surgeon: Wilber Lin MD;  [facility]    BRONCHOSCOPY, DILATE BRONCHUS, STENT BRONCHUS, COMBINED N/A 10/29/2018    Procedure: Flexible Bronchoscopy, Balloon Dilation,  Stent Revision, Airway Examination And Therapeutic Suctioning, Cyro Tumor Debulking;  Surgeon: Wilber Lin MD;  [facility]    BRONCHOSCOPY, DILATE BRONCHUS, STENT BRONCHUS, COMBINED N/A 11/20/2018    Procedure: Rigid Bronchoscopy, Stent Removal and dilitation;  Surgeon: Wilber Lin MD;  [facility]    BRONCHOSCOPY, DILATE BRONCHUS, STENT BRONCHUS, COMBINED N/A 12/14/2018    Procedure: Flexible And Rigid Bronchoscopy, Balloon Dilation, Bronchial Washing;  Surgeon: Wilber Lin MD;  [facility]    BRONCHOSCOPY, DILATE BRONCHUS, STENT BRONCHUS, COMBINED N/A 1/17/2019    Procedure: Flexible And Rigid Bronchoscopy, Balloon Dilation.;  Surgeon: Wilber Lin MD;  [facility]    BRONCHOSCOPY, DILATE BRONCHUS, STENT BRONCHUS, COMBINED N/A 3/7/2019    Procedure: Flexible and Rigid Bronchoscopy, Bronchial Washing, Balloon Dilation;  Surgeon: Wilber Lin MD;  [facility]    BRONCHOSCOPY, DILATE BRONCHUS, STENT BRONCHUS, COMBINED N/A 6/6/2019    Procedure: Rigid and Flexible Bronchoscopy, Balloon Dilation;  Surgeon: Wilber Lin MD;  [facility]    BRONCHOSCOPY, DILATE BRONCHUS, STENT BRONCHUS, COMBINED N/A 10/11/2019    Procedure: Flexible and Rigid Bronchoscopy, Balloon Dilation, Bronchoalveolar Lagave;  Surgeon: Wilber Lin MD;  [facility]    BRONCHOSCOPY, DILATE BRONCHUS, STENT BRONCHUS, COMBINED N/A 2/19/2021    Procedure: BRONCHOSCOPY, flexible, airway dilation, and bronchial wash;  Surgeon: Wilber Lin MD;  [facility]    BRONCHOSCOPY, DILATE BRONCHUS, STENT BRONCHUS, COMBINED N/A 4/9/2021    Procedure: BRONCHOSCOPY, flexible and rigid, Airway suctioning;  Surgeon: Mati Norris MD;  [facility]    BRONCHOSCOPY, DILATE BRONCHUS, STENT BRONCHUS, COMBINED N/A 10/17/2023    Procedure: RIGID, flexible bronchoscopy with airway dilation;  Surgeon: Preet Patel MD;  [facility]    CV RIGHT HEART  CATH MEASUREMENTS RECORDED N/A 3/10/2020    Procedure: CV RIGHT HEART CATH;  Surgeon: Wai Garcia MD;  [facility]    ENT SURGERY      tonsillectomy as a child    ESOPHAGOSCOPY, GASTROSCOPY, DUODENOSCOPY (EGD), COMBINED N/A 10/29/2018    Procedure: COMBINED ESOPHAGOSCOPY, GASTROSCOPY, DUODENOSCOPY (EGD) with biopsies and polypectomy;  Surgeon: Chente Bloom MD;  [facility]    INSERT EXTRACORPORAL MEMBRANE OXYGENATOR ADULT (OUTSIDE OR) N/A 2/27/2018    Procedure: INSERT EXTRACORPORAL MEMBRANE OXYGENATOR ADULT (OUTSIDE OR);  INSERT EXTRACORPORAL MEMBRANE OXYGENATOR ADULT (OUTSIDE OR) ;  Surgeon: Toby Hernandez MD;  [facility]    IR CVC TUNNEL PLACEMENT > 5 YRS OF AGE  10/25/2019    IR DIALYSIS FISTULOGRAM LEFT  3/2/2021    IR DIALYSIS FISTULOGRAM LEFT  11/2/2023    IR DIALYSIS MECH THROMB, PTA  3/2/2021    IR FLUORO 0-1 HOUR  5/7/2021    IR GASTRO JEJUNOSTOMY TUBE PLACEMENT  2/16/2021    IR PARACENTESIS  1/8/2020    IR THORACENTESIS  9/13/2019    IR TRANSCATHETER BIOPSY  1/8/2020    LASER CO2 BRONCHOSCOPY N/A 4/30/2021    Procedure: Flexible and Rigid Bronchoscopy and Tissue Debulking with CO2 Laser Assistance;  Surgeon: Mati Norris MD;  [facility]    LASER CO2 BRONCHOSCOPY N/A 6/11/2021    Procedure: BRONCHOSCOPY, Flexible and Rigid Bronchoscopy, Tissue Debulking with cryo Assistance, airway dilation,;  Surgeon: Mati Norris MD;  [facility]    LASER CO2 BRONCHOSCOPY N/A 9/16/2021    Procedure: BRONCHOSCOPY, flexible and rigid, CO2 Laser Debulking;  Surgeon: Mati Norris MD;  [facility]    LASER CO2 BRONCHOSCOPY N/A 2/11/2022    Procedure: flexible, rigid bronchoscopy, tissue debulking, airway dilation, co2 laser, bronchoalveolar lavage;  Surgeon: Juana Baugh MD;  [facility]    LASER CO2 BRONCHOSCOPY Right 11/17/2023    Procedure: flexible bronchosocopy, tissue/tumor debulking, using CO2 laser, mitomycin application  and argon plasma coagulation;  Surgeon: Mati Norris MD;  [facility]    no prior surgery      REMOVE EXTRACORPORAL MEMBRANE OXYGENATOR ADULT N/A 3/3/2018    Procedure: REMOVE EXTRACORPORAL MEMBRANE OXYGENATOR ADULT;  Removal of Right Femoral Venous and Right Axillary Arterial Extracorporeal Membrane Oxygenator;  Surgeon: Toby Hernandez MD;  [facility]    TRANSPLANT LUNG RECIPIENT SINGLE X2 Bilateral 3/1/2018    Procedure: TRANSPLANT LUNG RECIPIENT SINGLE X2;  Median Sternotomy, Extracorporeal Membrane Oxygenator, bilateral sequential lung transplant;  Surgeon: Toby Hernandez MD;  [facility]     Updated FH:  Family History   Problem Relation Age of Onset    Hypertension Mother     Arthritis Mother     Pancreatic Cancer Father     Diabetes Father      Updated SH:  Social History     Tobacco Use    Smoking status: Never    Smokeless tobacco: Never   Substance Use Topics    Alcohol use: No     Alcohol/week: 1.0 standard drink of alcohol     Types: 1 Glasses of wine per week    Drug use: No     Physical Exam:  Vitals: BP (!) 140/74   Pulse 102   Temp 97.8  F (36.6  C)   Resp 16   Wt 58.4 kg (128 lb 11.2 oz)   LMP 06/07/2014 (Exact Date)   SpO2 95%   PF (!) 2 L/min   BMI 22.80 kg/m    Wt Readings from Last 3 Encounters:   12/13/23 58.4 kg (128 lb 11.2 oz)   11/29/23 55.7 kg (122 lb 12.7 oz)   11/21/23 58.7 kg (129 lb 6.4 oz)      GA: NAD, appears as stated age  Skin: No acute rashes, no lesions  Psyc: appropriate, reactive    KPS: 70    Labs, pathology and other diagnostics reviewed    WBC   Date Value Ref Range Status   05/02/2021 7.5 4.0 - 11.0 10e9/L Final     WBC Count   Date Value Ref Range Status   12/13/2023 5.4 4.0 - 11.0 10e3/uL Final     Hemoglobin   Date Value Ref Range Status   12/13/2023 9.6 (L) 11.7 - 15.7 g/dL Final   05/02/2021 10.6 (L) 11.7 - 15.7 g/dL Final     Platelet Count   Date Value Ref Range Status   12/13/2023 103 (L) 150 - 450 10e3/uL Final    05/02/2021 214 150 - 450 10e9/L Final     Creatinine   Date Value Ref Range Status   12/13/2023 4.48 (H) 0.51 - 0.95 mg/dL Final   05/07/2021 3.90 (H) 0.52 - 1.04 mg/dL Final     Potassium   Date Value Ref Range Status   12/13/2023 4.3 3.4 - 5.3 mmol/L Final   05/26/2022 3.5 3.4 - 5.3 mmol/L Final   05/07/2021 4.3 3.4 - 5.3 mmol/L Final     Potassium POCT   Date Value Ref Range Status   10/17/2023 3.8 3.5 - 5.0 mmol/L Final     Magnesium   Date Value Ref Range Status   11/21/2023 1.8 1.7 - 2.3 mg/dL Final   04/30/2021 1.7 1.6 - 2.3 mg/dL Final     Tacrolimus Level   Date Value Ref Range Status   06/30/2021 7.2 5.0 - 15.0 ug/L Final     Comment:     Tacrolimus Reference Range  Kidney Transplant  Pediatric                      ug/L    0-3 months post transplant   10-12    3-6 months post transplant   8-10    6-12 months post transplant  6-8    >12 months post transplant   4-7  Adult    0-6 months post transplant   8-10    6-12 months post transplant  6-8    >12 months post transplant   4-6    >5 years post transplant     3-5  Heart Transplant  Pediatric    0-12 months post transplant  10-15    >12 months post transplant   5-10  Adult    0-3 months post transplant   10-15    3-6 months post transplant   8-12    6-12 months post transplant  6-12    >12 months post transplant   6-10  Lung Transplant    0-12 months post transplant  10-15    >12 months post transplant   8-12  Liver Transplant  Pediatric    0-3 months post transplant   10-15    3-6 months post transplant   8-10    >6 months post transplant    6-8  Adult    0-3 months post transplant   10-12    3-6 months post transplant   8-10    >6 months post transplant    6-8  Pancreas Transplant    0-6 months post transplant   8-10    >6 months post transplant    5-8  This test was developed and its performance characteristics determined by the   [facility].   It has not been cleared or approved by the  FDA. The laboratory is regulated   under CLIA as qualified to perform high-complexity testing. This test is used   for clinical purposes. It should not be regarded as investigational or for   research.       Tacrolimus by [employer] Mass Spectrometry   Date Value Ref Range Status   12/13/2023 5.9 5.0 - 15.0 ug/L Final     Comment:     Tacrolimus Reference Range (ug/L):    Kidney Transplant:  Pediatric  0-3 months post transplant: 10-12  3-6 months post transplant: 8-10  6-12 months post transplant: 6-8  >12 months post transplant: 4-7    Adult  0-6 months post transplant: 8-10  6-12 months post transplant: 6-8  >12 months post transplant: 4-6  >5 years post transplant: 3-5    Heart Transplant:  Pediatric  0-12 months post transplant: 10-15  >12 months post transplant: 5-10    Adult  0-3 months post transplant: 10-15  3-6 months post transplant: 8-12  6-12 months post transplant: 6-12  >12 months post transplant: 6-10    Lung Transplant:  0-12 months post transplant: 10-15  >12 months post transplant: 8-12    Liver Transplant:  Pediatric  0-3 months post transplant: 10-15  3-6 months post transplant: 8-10  6 months-5 years post transplant: 6-8   >5 years post transplant: 1-3    Adult  0-3 months post transplant: 10-12  3-6 months post transplant: 8-10  >6 months post transplant: 6-8    Pancreas Transplant:  0-6 months post transplant: 8-10  >6 months post transplant: 5-8     EBV DNA Copies/mL   Date Value Ref Range Status   05/01/2021 38,186 (A) EBVNEG^EBV DNA Not Detected [Copies]/mL Final         A/P    #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 Will proceed and recheck EBV next time she is here.    If EBV continues trending down, we can discharge from clinic and follow up prn.    40 minutes spent on the date of the encounter doing chart review, interpretation of results, patient visit, documentation and coordination of care.        Preet Alberto MD

## 2023-12-13 NOTE — PROGRESS NOTES
[facility]: Cancer Care Plan of Care Education Note                                    Discussion with Patient:                                                      Met with patient prior to visit with Dr. Alberto. Business card given to patient. Reviewed again literature on Rituxan.     Assessment:                                                      Assessment completed with:: Patient    Plan of Care Education   Yearly learning assessment completed?: Yes (see Education tab)  Diagnosis:: high EBV  Does patient understand diagnosis?: Yes  Tx plan/regimen:: Rituxan x1  Does patient understand treatment plan/regimen?: Yes  Preparing for treatment:: Reviewed treatment preparation information with patient (vascular access, day of chemo, visitor policy, what to bring, etc.)  Vascular access education provided for:: Peripheral IV  Side effect education:: Fatigue (risk for infection, risk for infusion reaction discussed)  Safety/self care at home reviewed with patient:: Yes  Coping - concerns/fears reviewed with patient:: Yes  Plan of Care:: Lab appointment;Treatment schedule (EBV next week when here to see SOT on 12/19)  When to call provider:: Bleeding;Increased shortness of breath;New/worsening pain;Temperature >100.4F;Shaking chills;Uncontrolled diarrhea/constipation;Uncontrolled nausea/vomiting  Reasons for deferring treatment reviewed with patient:: No    Evaluation of Learning  Patient Education Provided: Yes  Readiness:: Acceptance  Method:: Literature;Explanation  Response:: Demonstrates understanding    Intervention/Education provided during outreach:                                                       Plan for labs on 12/19 and will have EBV checked then--- ordered by SOT team.     Patient to follow up as scheduled at next appt  Patient to call/[employer] message with updates  Confirmed patient has clinic and triage numbers    Ciara Willoughby, MSN, RN, OCN   RN Care Coordinator   [facility]  [facility] [address] [address]   [phone]

## 2023-12-13 NOTE — PATIENT INSTRUCTIONS
[facility] Triage and after hours / weekends / holidays:  [phone]    Please call the triage or after hours line if you experience a temperature greater than or equal to 100.4, shaking chills, have uncontrolled nausea, vomiting and/or diarrhea, dizziness, shortness of breath, chest pain, bleeding, unexplained bruising, or if you have any other new/concerning symptoms, questions or concerns.      If you are having any concerning symptoms or wish to speak to a provider before your next infusion visit, please call triage to notify them so we can adequately serve you.     If you need a refill on a narcotic prescription or other medication, please call before your infusion appointment.                December 2023 Sunday Monday Tuesday Wednesday Thursday Friday Saturday                            1     2       3     4     5     6    LAB MAIL IN   8:30 AM   (15 min.)   [facility] LAB MAIL IN   [facility] 7     8     9       10  Happy Birthday!     11     12     13    LAB PERIPHERAL   7:45 AM   (15 min.)   [facility] LAB DRAW   [facility]    RETURN CCSL   8:15 AM   (30 min.)   Preet Alberto MD   [facility] Trans[facility] [place]    ONC INFUSION 6 HR (360 MIN)   9:00 AM   (360 min.)    ONC INFUSION NURSE   [facility] 14     15     16       17     18    NEW ONCOLOGY   7:45 AM   (80 min.)   Case Rey MD   [facility] 19    LAB  11:15 AM   (15 min.)   [facility]   [facility] [place]    XR CHEST 2 VIEWS  11:15 AM   (20 min.)   [id number]   [facility] Xray [place]    SPIROMETRY / FLOW LOOP  11:45 AM   (30 min.)   [facility] PFL A   [facility] Pulmonary Function Testing [place]    LUNG POST RETURN TXP PULM  12:35 PM   (40 min.)   Ame Chow PA-C   [facility]  20     21     22     23       24     25     26     27     28     29     30       31                                               January 2024 Sunday Monday Tuesday Wednesday Thursday Friday Saturday        1     2     3     4     5     6       7     8     9     10    RETURN ENDOCRINE   7:45 AM   (30 min.)   Lewis Patel MD   [facility] [place] 11     12     13       14     15     16     17     18     19     20       21     22     23     24     25     26     27       28     29     30     31                                   Recent Results (from the past 24 hour(s))   Basic metabolic panel    Collection Time: 12/13/23  8:21 AM   Result Value Ref Range    Sodium 140 135 - 145 mmol/L    Potassium 4.3 3.4 - 5.3 mmol/L    Chloride 98 98 - 107 mmol/L    Carbon Dioxide (CO2) 29 22 - 29 mmol/L    Anion Gap 13 7 - 15 mmol/L    Urea Nitrogen 41.0 (H) 8.0 - 23.0 mg/dL    Creatinine 4.48 (H) 0.51 - 0.95 mg/dL    GFR Estimate 11 (L) >60 mL/min/1.73m2    Calcium 7.9 (L) 8.8 - 10.2 mg/dL    Glucose 122 (H) 70 - 99 mg/dL   CBC with platelets and differential    Collection Time: 12/13/23  8:21 AM   Result Value Ref Range    WBC Count 5.4 4.0 - 11.0 10e3/uL    RBC Count 2.60 (L) 3.80 - 5.20 10e6/uL    Hemoglobin 9.6 (L) 11.7 - 15.7 g/dL    Hematocrit 29.9 (L) 35.0 - 47.0 %     (H) 78 - 100 fL    MCH 36.9 (H) 26.5 - 33.0 pg    MCHC 32.1 31.5 - 36.5 g/dL    RDW 15.9 (H) 10.0 - 15.0 %    Platelet Count 103 (L) 150 - 450 10e3/uL    % Neutrophils 75 %    % Lymphocytes 9 %    % Monocytes 14 %    % Eosinophils 0 %    % Basophils 1 %    % Immature Granulocytes 1 %    NRBCs per 100 WBC 0 <1 /100    Absolute Neutrophils 4.1 1.6 - 8.3 10e3/uL    Absolute Lymphocytes 0.5 (L) 0.8 - 5.3 10e3/uL    Absolute Monocytes 0.7 0.0 - 1.3 10e3/uL    Absolute Eosinophils 0.0 0.0 - 0.7 10e3/uL    Absolute Basophils 0.1 0.0 - 0.2 10e3/uL    Absolute Immature Granulocytes 0.0 <=0.4 10e3/uL    Absolute NRBCs  0.0 10e3/uL

## 2023-12-15 NOTE — PROGRESS NOTES
Date: Dec 13, 2023    Chief Complaint: PTLD evaluation    History Of Present Illness:    62 yo with history of bilateral lung transplant in 2018 with several complications, more recently, admitted with bronchial stenosis. She was found to have an elevated EBV level. We discussed last week and PET scan didn't show any masses. We meet to discuss. She reports that she continues to feel well. Energy is stable. She is still on oxygen. No lumps or bumps.     ROS: 14-point ROS is negative unless otherwise stated in HPI.    Oncology History from Prior Notes:  PET 11/30/23 negative      Medications:  Current Outpatient Medications   Medication    acetaminophen (TYLENOL) 325 MG tablet    acetylcysteine (MUCOMYST) 10 % nebulizer solution    albuterol (PROVENTIL) (2.5 MG/3ML) 0.083% neb solution    azaTHIOprine (IMURAN) 50 MG tablet    azithromycin (ZITHROMAX) 250 MG tablet    benzonatate (TESSALON) 100 MG capsule    fluticasone-salmeterol (ADVAIR) 250-50 MCG/ACT inhaler    guaiFENesin (MUCINEX) 600 MG 12 hr tablet    Magnesium Cl-Calcium Carbonate (SLOW-MAG) 71.5-119 MG TBEC    montelukast (SINGULAIR) 10 MG tablet    pantoprazole (PROTONIX) 40 MG EC tablet    posaconazole (NOXAFIL) 100 MG EC tablet    sulfamethoxazole-trimethoprim (BACTRIM) 400-80 MG tablet    valGANciclovir (VALCYTE) 450 MG tablet    cholecalciferol 50 MCG (2000 UT) CAPS    predniSONE (DELTASONE) 5 MG tablet    tacrolimus (GENERIC) 0.5 MG capsule     No current facility-administered medications for this visit.     Updated PMH:  Past Medical History:   Diagnosis Date    Acute on chronic respiratory failure with hypoxia (H) 02/21/2018    Anisocoria     Antisynthetase syndrome (H24) 2014    Anxiety     Aspergillus (H)     Aspergillus pneumonia (H) 11/20/2020    Benign essential hypertension     C. difficile colitis     Cytomegalovirus (CMV) viremia (H)     Dermatomyositis (H)     Dysplasia of cervix, low grade (ESTRADA 1)     EBV (Franklin-Barr virus) viremia      ESRD (end stage renal disease) on dialysis (H)     ILD (interstitial lung disease) (H)     Lung replaced by transplant (H)     Osteopenia     Paroxysmal atrial fibrillation (H)     Pneumocystis jiroveci pneumonia (H)     PONV (postoperative nausea and vomiting)     Post-menopause     Pulmonary hypertension (H)     Raynaud's disease     Seronegative rheumatoid arthritis (H)      Updated PSH:  Past Surgical History:   Procedure Laterality Date    BRONCHOSCOPY (RIGID OR FLEXIBLE), DIAGNOSTIC N/A 4/10/2018    Procedure: COMBINED BRONCHOSCOPY (RIGID OR FLEXIBLE), LAVAGE;;  Surgeon: Mariposa Donohue MD;  [facility]    BRONCHOSCOPY (RIGID OR FLEXIBLE), DIAGNOSTIC N/A 12/23/2020    Procedure: BRONCHOSCOPY, WITH BRONCHOALVEOLAR LAVAGE;  Surgeon: Uri Bass MD;  [facility]    BRONCHOSCOPY (RIGID OR FLEXIBLE), DIAGNOSTIC N/A 5/26/2022    Procedure: BRONCHOSCOPY, WITH BRONCHOALVEOLAR LAVAGE;  Surgeon: Uri Bass MD;  [facility]    BRONCHOSCOPY (RIGID OR FLEXIBLE), DIAGNOSTIC N/A 8/17/2023    Procedure: BRONCHOSCOPY, WITH BRONCHOALVEOLAR LAVAGE;  Surgeon: Esau Bruno MD;  [facility]    BRONCHOSCOPY (RIGID OR FLEXIBLE), DIAGNOSTIC N/A 11/1/2023    Procedure: BRONCHOSCOPY, WITH BRONCHOALVEOLAR LAVAGE;  Surgeon: Beto Magaña DO;  [facility]    BRONCHOSCOPY (RIGID OR FLEXIBLE), DILATE BRONCHUS / TRACHEA N/A 10/11/2018    Procedure: BRONCHOSCOPY (RIGID OR FLEXIBLE), DILATE BRONCHUS / TRACHEA;  Flexible And Rigid Bronchoscopy and Dilation;  Surgeon: Wilber Lin MD;  [facility]    BRONCHOSCOPY (RIGID OR FLEXIBLE), DILATE BRONCHUS / TRACHEA N/A 11/1/2023    Procedure: Bronchoscopy (Rigid Or Flexible), Dilate Bronchus / Trachea;  Surgeon: Beto Magaña DO;  [facility]    BRONCHOSCOPY FLEXIBLE N/A 3/13/2018    Procedure: BRONCHOSCOPY FLEXIBLE;  Flexible Bronchoscopy ;  Surgeon: Gissell Sanchez MD;  [facility]    BRONCHOSCOPY FLEXIBLE N/A  5/9/2018    Procedure: BRONCHOSCOPY FLEXIBLE;;  Surgeon: Wilber Lin MD;  [facility]    BRONCHOSCOPY FLEXIBLE AND RIGID N/A 9/10/2018    Procedure: BRONCHOSCOPY FLEXIBLE AND RIGID;  Flexible and Rigid Bronchoscopy with Balloon Dilation, tissue debulking with cryo, and Right mainstem bronchus stent placement;  Surgeon: Wilber Lin MD;  [facility]    BRONCHOSCOPY RIGID N/A 6/6/2018    Procedure: BRONCHOSCOPY RIGID;;  Surgeon: Lopez Macias MD;  [facility]    BRONCHOSCOPY, DILATE BRONCHUS, STENT BRONCHUS, COMBINED N/A 6/11/2018    Procedure: COMBINED BRONCHOSCOPY, DILATE BRONCHUS, STENT BRONCHUS;  Flexible Bronchoscopy, Balloon Dilation, Bronchial Washings;  Surgeon: Wilber Lin MD;  [facility]    BRONCHOSCOPY, DILATE BRONCHUS, STENT BRONCHUS, COMBINED Right 7/10/2018    Procedure: COMBINED BRONCHOSCOPY, DILATE BRONCHUS, STENT BRONCHUS;  Flexible Bronchoscopy, Balloon Dilation, Bronchial Washings  ;  Surgeon: Wilber Lin MD;  [facility]    BRONCHOSCOPY, DILATE BRONCHUS, STENT BRONCHUS, COMBINED N/A 8/2/2018    Procedure: COMBINED BRONCHOSCOPY, DILATE BRONCHUS, STENT BRONCHUS;  Flexible Bronchoscopy, Bronchial Washings, Balloon Dilation;  Surgeon: Wilber Lin MD;  [facility]    BRONCHOSCOPY, DILATE BRONCHUS, STENT BRONCHUS, COMBINED N/A 8/20/2018    Procedure: COMBINED BRONCHOSCOPY, DILATE BRONCHUS, STENT BRONCHUS;  Flexible Bronchoscopy, Balloon Dilation;  Surgeon: Wilber Lin MD;  [facility]    BRONCHOSCOPY, DILATE BRONCHUS, STENT BRONCHUS, COMBINED N/A 10/29/2018    Procedure: Flexible Bronchoscopy, Balloon Dilation, Stent Revision, Airway Examination And Therapeutic Suctioning, Cyro Tumor Debulking;  Surgeon: Wilber Lin MD;  [facility]    BRONCHOSCOPY, DILATE BRONCHUS, STENT BRONCHUS, COMBINED N/A 11/20/2018    Procedure: Rigid Bronchoscopy, Stent Removal and dilitation;  Surgeon: Delia  Wilber Warner MD;  [facility]    BRONCHOSCOPY, DILATE BRONCHUS, STENT BRONCHUS, COMBINED N/A 12/14/2018    Procedure: Flexible And Rigid Bronchoscopy, Balloon Dilation, Bronchial Washing;  Surgeon: Wilber Lin MD;  [facility]    BRONCHOSCOPY, DILATE BRONCHUS, STENT BRONCHUS, COMBINED N/A 1/17/2019    Procedure: Flexible And Rigid Bronchoscopy, Balloon Dilation.;  Surgeon: Wilber Lin MD;  [facility]    BRONCHOSCOPY, DILATE BRONCHUS, STENT BRONCHUS, COMBINED N/A 3/7/2019    Procedure: Flexible and Rigid Bronchoscopy, Bronchial Washing, Balloon Dilation;  Surgeon: Wilber Lin MD;  [facility]    BRONCHOSCOPY, DILATE BRONCHUS, STENT BRONCHUS, COMBINED N/A 6/6/2019    Procedure: Rigid and Flexible Bronchoscopy, Balloon Dilation;  Surgeon: Wilber Lin MD;  [facility]    BRONCHOSCOPY, DILATE BRONCHUS, STENT BRONCHUS, COMBINED N/A 10/11/2019    Procedure: Flexible and Rigid Bronchoscopy, Balloon Dilation, Bronchoalveolar Lagave;  Surgeon: Wilber Lin MD;  [facility]    BRONCHOSCOPY, DILATE BRONCHUS, STENT BRONCHUS, COMBINED N/A 2/19/2021    Procedure: BRONCHOSCOPY, flexible, airway dilation, and bronchial wash;  Surgeon: Wilber Lin MD;  [facility]    BRONCHOSCOPY, DILATE BRONCHUS, STENT BRONCHUS, COMBINED N/A 4/9/2021    Procedure: BRONCHOSCOPY, flexible and rigid, Airway suctioning;  Surgeon: Mati Norris MD;  [facility]    BRONCHOSCOPY, DILATE BRONCHUS, STENT BRONCHUS, COMBINED N/A 10/17/2023    Procedure: RIGID, flexible bronchoscopy with airway dilation;  Surgeon: Preet Patel MD;  [facility]    CV RIGHT HEART CATH MEASUREMENTS RECORDED N/A 3/10/2020    Procedure: CV RIGHT HEART CATH;  Surgeon: Wai Garcia MD;  [facility]    ENT SURGERY      tonsillectomy as a child    ESOPHAGOSCOPY, GASTROSCOPY, DUODENOSCOPY (EGD), COMBINED N/A 10/29/2018    Procedure: COMBINED  ESOPHAGOSCOPY, GASTROSCOPY, DUODENOSCOPY (EGD) with biopsies and polypectomy;  Surgeon: Chente Bloom MD;  [facility]    INSERT EXTRACORPORAL MEMBRANE OXYGENATOR ADULT (OUTSIDE OR) N/A 2/27/2018    Procedure: INSERT EXTRACORPORAL MEMBRANE OXYGENATOR ADULT (OUTSIDE OR);  INSERT EXTRACORPORAL MEMBRANE OXYGENATOR ADULT (OUTSIDE OR) ;  Surgeon: Toby Hernandez MD;  [facility]    IR CVC TUNNEL PLACEMENT > 5 YRS OF AGE  10/25/2019    IR DIALYSIS FISTULOGRAM LEFT  3/2/2021    IR DIALYSIS FISTULOGRAM LEFT  11/2/2023    IR DIALYSIS MECH THROMB, PTA  3/2/2021    IR FLUORO 0-1 HOUR  5/7/2021    IR GASTRO JEJUNOSTOMY TUBE PLACEMENT  2/16/2021    IR PARACENTESIS  1/8/2020    IR THORACENTESIS  9/13/2019    IR TRANSCATHETER BIOPSY  1/8/2020    LASER CO2 BRONCHOSCOPY N/A 4/30/2021    Procedure: Flexible and Rigid Bronchoscopy and Tissue Debulking with CO2 Laser Assistance;  Surgeon: Mati Norris MD;  [facility]    LASER CO2 BRONCHOSCOPY N/A 6/11/2021    Procedure: BRONCHOSCOPY, Flexible and Rigid Bronchoscopy, Tissue Debulking with cryo Assistance, airway dilation,;  Surgeon: Mati Norris MD;  [facility]    LASER CO2 BRONCHOSCOPY N/A 9/16/2021    Procedure: BRONCHOSCOPY, flexible and rigid, CO2 Laser Debulking;  Surgeon: Mati Norris MD;  [facility]    LASER CO2 BRONCHOSCOPY N/A 2/11/2022    Procedure: flexible, rigid bronchoscopy, tissue debulking, airway dilation, co2 laser, bronchoalveolar lavage;  Surgeon: Juana Baugh MD;  [facility]    LASER CO2 BRONCHOSCOPY Right 11/17/2023    Procedure: flexible bronchosocopy, tissue/tumor debulking, using CO2 laser, mitomycin application and argon plasma coagulation;  Surgeon: Mati Norris MD;  [facility]    no prior surgery      REMOVE EXTRACORPORAL MEMBRANE OXYGENATOR ADULT N/A 3/3/2018    Procedure: REMOVE EXTRACORPORAL MEMBRANE OXYGENATOR ADULT;  Removal of Right Femoral Venous and Right Axillary Arterial  Extracorporeal Membrane Oxygenator;  Surgeon: Toby Hernandez MD;  [facility]    TRANSPLANT LUNG RECIPIENT SINGLE X2 Bilateral 3/1/2018    Procedure: TRANSPLANT LUNG RECIPIENT SINGLE X2;  Median Sternotomy, Extracorporeal Membrane Oxygenator, bilateral sequential lung transplant;  Surgeon: Toby Hernandez MD;  [facility]     Updated FH:  Family History   Problem Relation Age of Onset    Hypertension Mother     Arthritis Mother     Pancreatic Cancer Father     Diabetes Father      Updated SH:  Social History     Tobacco Use    Smoking status: Never    Smokeless tobacco: Never   Substance Use Topics    Alcohol use: No     Alcohol/week: 1.0 standard drink of alcohol     Types: 1 Glasses of wine per week    Drug use: No     Physical Exam:  Vitals: BP (!) 140/74   Pulse 102   Temp 97.8  F (36.6  C)   Resp 16   Wt 58.4 kg (128 lb 11.2 oz)   LMP 06/07/2014 (Exact Date)   SpO2 95%   PF (!) 2 L/min   BMI 22.80 kg/m    Wt Readings from Last 3 Encounters:   12/13/23 58.4 kg (128 lb 11.2 oz)   11/29/23 55.7 kg (122 lb 12.7 oz)   11/21/23 58.7 kg (129 lb 6.4 oz)      GA: NAD, appears as stated age  Skin: No acute rashes, no lesions  Psyc: appropriate, reactive    KPS: 70    Labs, pathology and other diagnostics reviewed    WBC   Date Value Ref Range Status   05/02/2021 7.5 4.0 - 11.0 10e9/L Final     WBC Count   Date Value Ref Range Status   12/13/2023 5.4 4.0 - 11.0 10e3/uL Final     Hemoglobin   Date Value Ref Range Status   12/13/2023 9.6 (L) 11.7 - 15.7 g/dL Final   05/02/2021 10.6 (L) 11.7 - 15.7 g/dL Final     Platelet Count   Date Value Ref Range Status   12/13/2023 103 (L) 150 - 450 10e3/uL Final   05/02/2021 214 150 - 450 10e9/L Final     Creatinine   Date Value Ref Range Status   12/13/2023 4.48 (H) 0.51 - 0.95 mg/dL Final   05/07/2021 3.90 (H) 0.52 - 1.04 mg/dL Final     Potassium   Date Value Ref Range Status   12/13/2023 4.3 3.4 - 5.3 mmol/L Final   05/26/2022 3.5 3.4 - 5.3  mmol/L Final   05/07/2021 4.3 3.4 - 5.3 mmol/L Final     Potassium POCT   Date Value Ref Range Status   10/17/2023 3.8 3.5 - 5.0 mmol/L Final     Magnesium   Date Value Ref Range Status   11/21/2023 1.8 1.7 - 2.3 mg/dL Final   04/30/2021 1.7 1.6 - 2.3 mg/dL Final     Tacrolimus Level   Date Value Ref Range Status   06/30/2021 7.2 5.0 - 15.0 ug/L Final     Comment:     Tacrolimus Reference Range  Kidney Transplant  Pediatric                      ug/L    0-3 months post transplant   10-12    3-6 months post transplant   8-10    6-12 months post transplant  6-8    >12 months post transplant   4-7  Adult    0-6 months post transplant   8-10    6-12 months post transplant  6-8    >12 months post transplant   4-6    >5 years post transplant     3-5  Heart Transplant  Pediatric    0-12 months post transplant  10-15    >12 months post transplant   5-10  Adult    0-3 months post transplant   10-15    3-6 months post transplant   8-12    6-12 months post transplant  6-12    >12 months post transplant   6-10  Lung Transplant    0-12 months post transplant  10-15    >12 months post transplant   8-12  Liver Transplant  Pediatric    0-3 months post transplant   10-15    3-6 months post transplant   8-10    >6 months post transplant    6-8  Adult    0-3 months post transplant   10-12    3-6 months post transplant   8-10    >6 months post transplant    6-8  Pancreas Transplant    0-6 months post transplant   8-10    >6 months post transplant    5-8  This test was developed and its performance characteristics determined by the   [facility].   It has not been cleared or approved by the FDA. The laboratory is regulated   under CLIA as qualified to perform high-complexity testing. This test is used   for clinical purposes. It should not be regarded as investigational or for   research.       Tacrolimus by [employer] Mass Spectrometry   Date Value Ref Range Status    12/13/2023 5.9 5.0 - 15.0 ug/L Final     Comment:     Tacrolimus Reference Range (ug/L):    Kidney Transplant:  Pediatric  0-3 months post transplant: 10-12  3-6 months post transplant: 8-10  6-12 months post transplant: 6-8  >12 months post transplant: 4-7    Adult  0-6 months post transplant: 8-10  6-12 months post transplant: 6-8  >12 months post transplant: 4-6  >5 years post transplant: 3-5    Heart Transplant:  Pediatric  0-12 months post transplant: 10-15  >12 months post transplant: 5-10    Adult  0-3 months post transplant: 10-15  3-6 months post transplant: 8-12  6-12 months post transplant: 6-12  >12 months post transplant: 6-10    Lung Transplant:  0-12 months post transplant: 10-15  >12 months post transplant: 8-12    Liver Transplant:  Pediatric  0-3 months post transplant: 10-15  3-6 months post transplant: 8-10  6 months-5 years post transplant: 6-8   >5 years post transplant: 1-3    Adult  0-3 months post transplant: 10-12  3-6 months post transplant: 8-10  >6 months post transplant: 6-8    Pancreas Transplant:  0-6 months post transplant: 8-10  >6 months post transplant: 5-8     EBV DNA Copies/mL   Date Value Ref Range Status   05/01/2021 38,186 (A) EBVNEG^EBV DNA Not Detected [Copies]/mL Final         A/P    #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 Will proceed and recheck EBV next time she is here.    If EBV continues trending down, we can discharge from clinic and follow up prn.    40 minutes spent on the date of the encounter doing chart review, interpretation of results, patient visit, documentation and coordination of care.

## 2023-12-16 NOTE — PROGRESS NOTES
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 Last bronch on 11/17 with tissue debulking with dilation and suctioning, no stent placement.     1. Recurrent right mainstem bronchial stenosis s/p balloon dilation: doing well, much better activity likely related to her EBV treatment. Active around the house, wondering if she still needs her O2.  - 6 MWT today on room air with O2 titration as necessary  - Bronch in 3 months, ~ 2/17/24, will schedule next follow up prior to OR    2. 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 Overall improved, interested in starting PT as a bridge to pulmonary rehab the beginning of February. Sating 100% on 2L. DSA 10/27 negative. Immunknow of 169 on 11/21. CXR reviewed by me demonstrates stable changes of transplant. PFTs improved  110 ml today from prior.  - PT now with plan to start pulmonary rehab in February.   - Continue albuterol nebs PRN and Mucinex BID  - Continue IS including tacrolimus and prednisone; AZA on hold for EBV, also with Immunknow per above  - Bactrim for PJP ppx  CLAD: continue azithromycin, singulair, advair  - Following with Palliative care for GOC     3. EBV  viremia: level peaked at 1 million on 11/21, underwent PET scan and saw Heme/Onc and is s/p rituxan last week. Recheck was down to 559K on 12/13.   - EBV pending for today    4. CMV viremia: low grade CMV viremia since 8/23. Was started on VGCV three times weekly after HD approx 10/6/23. VGCV dosing increased to daily beginning 10/27 given persisting low-level viremia on repeat CMV levels. CMV has been negative x 1 after steroid burst/taper.   - If CMV is negative today, will stop valgancyclovir and monitor weekly    5. Left-sided aspergillus empyema:   A. Ochraceus: noted in 2019 s/p needle aspiration with Aspergillus fumigatus on cultures s/p intrapleural amphotericin bead placement. Now with A. Ochraceus on BAL from 11/1. Posaconazole indefinitely per ID. Last level of 1.4 on 11/3.   - Continue posaconazole  - Levels every 6 months     6. ESRD on iHD (since 10/2019):  HTN: continues on iHD M/W/F. BP improved today. At this point, pulmonary function precludes Kecia from renal transplant, will continue to monitor pulmonary function for improvement.     7. Leukopenia: WBC of 2.9 today, likely related to valcyte and recent rituxan.   - AZA on hold  - CMV monitoring per above, will recheck next week     Chronic issues:  1. Paroxysmal AFib  2. Hypomagnesemia  3. Congestive hepatopathy with fibrosis  4. Osteoporosis    RTC: 2 months prior to OR bronch  Vaccinations: got flu, covid and RSV vaccine  Preventative: colonoscopy due 2033; mammogram in February negative; DEXA > May 2024; following with Derm, needs follow up    Ame Chow PA-C  Pulmonary, Allergy, Critical Care and Sleep Medicine        Interval History:     S/p rituxan last week, follow up EBV level of 559. Better energy, can get some work done around the house. Using 2 L O2 at home, had a walk test scheduled. No fever or chills, does have a cough in the am, feels like it could be related to PND at night. Otherwise cough is baseline and productive, no blood.  No congestion, tightness or wheezing, using 2L O2 at night as well. No headaches, no chest pain or palpitations. No bloating or gas, stools are stable.           Review of Systems:   Please see HPI, otherwise the complete 10 point ROS is negative.           Past Medical and Surgical History:     Past Medical History:   Diagnosis Date    Acute on chronic respiratory failure with hypoxia (H) 02/21/2018    Anisocoria     Antisynthetase syndrome (H24) 2014    Anxiety     Aspergillus (H)     Aspergillus pneumonia (H) 11/20/2020    Benign essential hypertension     C. difficile colitis     Cytomegalovirus (CMV) viremia (H)     Dermatomyositis (H)     Dysplasia of cervix, low grade (ESTRADA 1)     EBV (Franklin-Barr virus) viremia     ESRD (end stage renal disease) on dialysis (H)     ILD (interstitial lung disease) (H)     Lung replaced by transplant (H)     Osteopenia     Paroxysmal atrial fibrillation (H)     Pneumocystis jiroveci pneumonia (H)     PONV (postoperative nausea and vomiting)     Post-menopause     Pulmonary hypertension (H)     Raynaud's disease     Seronegative rheumatoid arthritis (H)      Past Surgical History:   Procedure Laterality Date    BRONCHOSCOPY (RIGID OR FLEXIBLE), DIAGNOSTIC N/A 4/10/2018    Procedure: COMBINED BRONCHOSCOPY (RIGID OR FLEXIBLE), LAVAGE;;  Surgeon: Mariposa Donohue MD;  [facility]    BRONCHOSCOPY (RIGID OR FLEXIBLE), DIAGNOSTIC N/A 12/23/2020    Procedure: BRONCHOSCOPY, WITH BRONCHOALVEOLAR LAVAGE;  Surgeon: Uri Bass MD;  [facility]    BRONCHOSCOPY (RIGID OR FLEXIBLE), DIAGNOSTIC N/A 5/26/2022    Procedure: BRONCHOSCOPY, WITH BRONCHOALVEOLAR LAVAGE;  Surgeon: Uri Bass MD;  [facility]    BRONCHOSCOPY (RIGID OR FLEXIBLE), DIAGNOSTIC N/A 8/17/2023    Procedure: BRONCHOSCOPY, WITH BRONCHOALVEOLAR LAVAGE;  Surgeon: Esau Bruno MD;  [facility]    BRONCHOSCOPY (RIGID OR FLEXIBLE), DIAGNOSTIC N/A 11/1/2023    Procedure: BRONCHOSCOPY,  WITH BRONCHOALVEOLAR LAVAGE;  Surgeon: Beto Magaña DO;  [facility]: [facility]    BRONCHOSCOPY (RIGID OR FLEXIBLE), DILATE BRONCHUS / TRACHEA N/A 10/11/2018    Procedure: BRONCHOSCOPY (RIGID OR FLEXIBLE), DILATE BRONCHUS / TRACHEA;  Flexible And Rigid Bronchoscopy and Dilation;  Surgeon: Wilber Lin MD;  [facility]    BRONCHOSCOPY (RIGID OR FLEXIBLE), DILATE BRONCHUS / TRACHEA N/A 11/1/2023    Procedure: Bronchoscopy (Rigid Or Flexible), Dilate Bronchus / Trachea;  Surgeon: Beto Magaña DO;  [facility]    BRONCHOSCOPY FLEXIBLE N/A 3/13/2018    Procedure: BRONCHOSCOPY FLEXIBLE;  Flexible Bronchoscopy ;  Surgeon: Gissell Sanchez MD;  [facility]    BRONCHOSCOPY FLEXIBLE N/A 5/9/2018    Procedure: BRONCHOSCOPY FLEXIBLE;;  Surgeon: Wilber Lin MD;  [facility]    BRONCHOSCOPY FLEXIBLE AND RIGID N/A 9/10/2018    Procedure: BRONCHOSCOPY FLEXIBLE AND RIGID;  Flexible and Rigid Bronchoscopy with Balloon Dilation, tissue debulking with cryo, and Right mainstem bronchus stent placement;  Surgeon: Wilber Lin MD;  [facility]    BRONCHOSCOPY RIGID N/A 6/6/2018    Procedure: BRONCHOSCOPY RIGID;;  Surgeon: Lopez Macias MD;  [facility]    BRONCHOSCOPY, DILATE BRONCHUS, STENT BRONCHUS, COMBINED N/A 6/11/2018    Procedure: COMBINED BRONCHOSCOPY, DILATE BRONCHUS, STENT BRONCHUS;  Flexible Bronchoscopy, Balloon Dilation, Bronchial Washings;  Surgeon: Wilber Lin MD;  [facility]    BRONCHOSCOPY, DILATE BRONCHUS, STENT BRONCHUS, COMBINED Right 7/10/2018    Procedure: COMBINED BRONCHOSCOPY, DILATE BRONCHUS, STENT BRONCHUS;  Flexible Bronchoscopy, Balloon Dilation, Bronchial Washings  ;  Surgeon: Wilber Lin MD;  [facility]    BRONCHOSCOPY, DILATE BRONCHUS, STENT BRONCHUS, COMBINED N/A 8/2/2018    Procedure: COMBINED BRONCHOSCOPY, DILATE BRONCHUS, STENT BRONCHUS;  Flexible Bronchoscopy, Bronchial Washings, Balloon Dilation;   Surgeon: Wilber Lin MD;  [facility]    BRONCHOSCOPY, DILATE BRONCHUS, STENT BRONCHUS, COMBINED N/A 8/20/2018    Procedure: COMBINED BRONCHOSCOPY, DILATE BRONCHUS, STENT BRONCHUS;  Flexible Bronchoscopy, Balloon Dilation;  Surgeon: Wilber Lin MD;  [facility]    BRONCHOSCOPY, DILATE BRONCHUS, STENT BRONCHUS, COMBINED N/A 10/29/2018    Procedure: Flexible Bronchoscopy, Balloon Dilation, Stent Revision, Airway Examination And Therapeutic Suctioning, Cyro Tumor Debulking;  Surgeon: Wilber Lin MD;  [facility]    BRONCHOSCOPY, DILATE BRONCHUS, STENT BRONCHUS, COMBINED N/A 11/20/2018    Procedure: Rigid Bronchoscopy, Stent Removal and dilitation;  Surgeon: Wilber Lin MD;  [facility]    BRONCHOSCOPY, DILATE BRONCHUS, STENT BRONCHUS, COMBINED N/A 12/14/2018    Procedure: Flexible And Rigid Bronchoscopy, Balloon Dilation, Bronchial Washing;  Surgeon: Wilber Lin MD;  [facility]    BRONCHOSCOPY, DILATE BRONCHUS, STENT BRONCHUS, COMBINED N/A 1/17/2019    Procedure: Flexible And Rigid Bronchoscopy, Balloon Dilation.;  Surgeon: Wilber Lin MD;  [facility]    BRONCHOSCOPY, DILATE BRONCHUS, STENT BRONCHUS, COMBINED N/A 3/7/2019    Procedure: Flexible and Rigid Bronchoscopy, Bronchial Washing, Balloon Dilation;  Surgeon: Wilber Lin MD;  [facility]    BRONCHOSCOPY, DILATE BRONCHUS, STENT BRONCHUS, COMBINED N/A 6/6/2019    Procedure: Rigid and Flexible Bronchoscopy, Balloon Dilation;  Surgeon: Wilber Lin MD;  [facility]    BRONCHOSCOPY, DILATE BRONCHUS, STENT BRONCHUS, COMBINED N/A 10/11/2019    Procedure: Flexible and Rigid Bronchoscopy, Balloon Dilation, Bronchoalveolar Lagave;  Surgeon: Wilber Lin MD;  [facility]    BRONCHOSCOPY, DILATE BRONCHUS, STENT BRONCHUS, COMBINED N/A 2/19/2021    Procedure: BRONCHOSCOPY, flexible, airway dilation, and bronchial wash;  Surgeon: Wilber Lin  MD Alana;  [facility]    BRONCHOSCOPY, DILATE BRONCHUS, STENT BRONCHUS, COMBINED N/A 4/9/2021    Procedure: BRONCHOSCOPY, flexible and rigid, Airway suctioning;  Surgeon: Mati Norris MD;  [facility]    BRONCHOSCOPY, DILATE BRONCHUS, STENT BRONCHUS, COMBINED N/A 10/17/2023    Procedure: RIGID, flexible bronchoscopy with airway dilation;  Surgeon: Preet Patel MD;  [facility]    CV RIGHT HEART CATH MEASUREMENTS RECORDED N/A 3/10/2020    Procedure: CV RIGHT HEART CATH;  Surgeon: Wai Garcia MD;  [facility]    ENT SURGERY      tonsillectomy as a child    ESOPHAGOSCOPY, GASTROSCOPY, DUODENOSCOPY (EGD), COMBINED N/A 10/29/2018    Procedure: COMBINED ESOPHAGOSCOPY, GASTROSCOPY, DUODENOSCOPY (EGD) with biopsies and polypectomy;  Surgeon: Chente Bloom MD;  [facility]    INSERT EXTRACORPORAL MEMBRANE OXYGENATOR ADULT (OUTSIDE OR) N/A 2/27/2018    Procedure: INSERT EXTRACORPORAL MEMBRANE OXYGENATOR ADULT (OUTSIDE OR);  INSERT EXTRACORPORAL MEMBRANE OXYGENATOR ADULT (OUTSIDE OR) ;  Surgeon: Toby Hernandez MD;  [facility]    IR CVC TUNNEL PLACEMENT > 5 YRS OF AGE  10/25/2019    IR DIALYSIS FISTULOGRAM LEFT  3/2/2021    IR DIALYSIS FISTULOGRAM LEFT  11/2/2023    IR DIALYSIS MECH THROMB, PTA  3/2/2021    IR FLUORO 0-1 HOUR  5/7/2021    IR GASTRO JEJUNOSTOMY TUBE PLACEMENT  2/16/2021    IR PARACENTESIS  1/8/2020    IR THORACENTESIS  9/13/2019    IR TRANSCATHETER BIOPSY  1/8/2020    LASER CO2 BRONCHOSCOPY N/A 4/30/2021    Procedure: Flexible and Rigid Bronchoscopy and Tissue Debulking with CO2 Laser Assistance;  Surgeon: Mati Norris MD;  [facility]    LASER CO2 BRONCHOSCOPY N/A 6/11/2021    Procedure: BRONCHOSCOPY, Flexible and Rigid Bronchoscopy, Tissue Debulking with cryo Assistance, airway dilation,;  Surgeon: Mati Norris MD;  [facility]    LASER CO2 BRONCHOSCOPY N/A 9/16/2021    Procedure: BRONCHOSCOPY, flexible and  rigid, CO2 Laser Debulking;  Surgeon: Mati Norris MD;  [facility]    LASER CO2 BRONCHOSCOPY N/A 2/11/2022    Procedure: flexible, rigid bronchoscopy, tissue debulking, airway dilation, co2 laser, bronchoalveolar lavage;  Surgeon: Juana Baugh MD;  [facility]    LASER CO2 BRONCHOSCOPY Right 11/17/2023    Procedure: flexible bronchosocopy, tissue/tumor debulking, using CO2 laser, mitomycin application and argon plasma coagulation;  Surgeon: Mati Norris MD;  [facility]    no prior surgery      REMOVE EXTRACORPORAL MEMBRANE OXYGENATOR ADULT N/A 3/3/2018    Procedure: REMOVE EXTRACORPORAL MEMBRANE OXYGENATOR ADULT;  Removal of Right Femoral Venous and Right Axillary Arterial Extracorporeal Membrane Oxygenator;  Surgeon: Toby Hernandez MD;  [facility]    TRANSPLANT LUNG RECIPIENT SINGLE X2 Bilateral 3/1/2018    Procedure: TRANSPLANT LUNG RECIPIENT SINGLE X2;  Median Sternotomy, Extracorporeal Membrane Oxygenator, bilateral sequential lung transplant;  Surgeon: Toby Hernandez MD;  [facility]           Family History:     Family History   Problem Relation Age of Onset    Hypertension Mother     Arthritis Mother     Pancreatic Cancer Father     Diabetes Father             Social History:     Social History     Socioeconomic History    Marital status:      Spouse name: Not on file    Number of children: Not on file    Years of education: Not on file    Highest education level: Not on file   Occupational History    Not on file   Tobacco Use    Smoking status: Never    Smokeless tobacco: Never   Substance and Sexual Activity    Alcohol use: No     Alcohol/week: 1.0 standard drink of alcohol     Types: 1 Glasses of wine per week    Drug use: No    Sexual activity: Not on file   Other Topics Concern    Parent/sibling w/ CABG, MI or angioplasty before 65F 55M? No   Social History Narrative    3/6/2019 - Lives with . Has three daughters. Four grandchildren  (two ). No pets. Travelled previously to [facility]. Has visited [place] several times.      Social Determinants of Health     Financial Resource Strain: Not on file   Food Insecurity: Not on file   Transportation Needs: Not on file   Physical Activity: Not on file   Stress: Not on file   Social Connections: Not on file   Interpersonal Safety: Not on file   Housing Stability: Not on file            Medications:     Current Outpatient Medications   Medication    albuterol (PROVENTIL) (2.5 MG/3ML) 0.083% neb solution    ipratropium (ATROVENT) 0.06 % nasal spray    acetaminophen (TYLENOL) 325 MG tablet    acetylcysteine (MUCOMYST) 10 % nebulizer solution    azaTHIOprine (IMURAN) 50 MG tablet    azithromycin (ZITHROMAX) 250 MG tablet    benzonatate (TESSALON) 100 MG capsule    cholecalciferol 50 MCG ( UT) CAPS    fluticasone-salmeterol (ADVAIR) 250-50 MCG/ACT inhaler    guaiFENesin (MUCINEX) 600 MG 12 hr tablet    Magnesium Cl-Calcium Carbonate (SLOW-MAG) 71.5-119 MG TBEC    montelukast (SINGULAIR) 10 MG tablet    pantoprazole (PROTONIX) 40 MG EC tablet    posaconazole (NOXAFIL) 100 MG EC tablet    predniSONE (DELTASONE) 5 MG tablet    sulfamethoxazole-trimethoprim (BACTRIM) 400-80 MG tablet    tacrolimus (GENERIC) 0.5 MG capsule    valGANciclovir (VALCYTE) 450 MG tablet     No current facility-administered medications for this visit.            Physical Exam:   /76   Pulse 96   LMP 2014 (Exact Date)   SpO2 100%     GENERAL: alert, NAD  HEENT: NCAT, EOMI, no scleral icterus, oral mucosa moist and without lesions  Neck: no cervical or supraclavicular adenopathy  Lungs: moderate airflow, scattered crackles and rhonchi  CV: irregular rhythm, S1S2, no murmurs noted  Abdomen: normoactive BS, soft  Lymph: no edema  Neuro: AAO X 3, CN 2-12 grossly intact  Psychiatric: normal affect, good eye contact  Skin: no rash, jaundice or lesions on limited exam         Data:   All laboratory and imaging  data reviewed.      Recent Results (from the past 168 hour(s))   EBV DNA PCR Quantitative Whole Blood    Collection Time: 12/13/23  8:21 AM   Result Value Ref Range    EBV DNA Copies/mL 559,942 (H) <=0 copies/mL    EBV log 5.7    Hepatitis B surface antigen    Collection Time: 12/13/23  8:21 AM   Result Value Ref Range    Hepatitis B Surface Antigen Nonreactive Nonreactive   Hepatitis B Surface Antibody    Collection Time: 12/13/23  8:21 AM   Result Value Ref Range    Hepatitis B Surface Antibody Instrument Value 0.92 <8.00 m[IU]/mL    Hepatitis B Surface Antibody Nonreactive    Hepatitis B core antibody    Collection Time: 12/13/23  8:21 AM   Result Value Ref Range    Hepatitis B Core Antibody Total Nonreactive Nonreactive   Tacrolimus by Tandem Mass Spectrometry    Collection Time: 12/13/23  8:21 AM   Result Value Ref Range    Tacrolimus by Tandem Mass Spectrometry 5.9 5.0 - 15.0 ug/L    Tacrolimus Last Dose Date 12/12/2023     Tacrolimus Last Dose Time  7:45 PM    Basic metabolic panel    Collection Time: 12/13/23  8:21 AM   Result Value Ref Range    Sodium 140 135 - 145 mmol/L    Potassium 4.3 3.4 - 5.3 mmol/L    Chloride 98 98 - 107 mmol/L    Carbon Dioxide (CO2) 29 22 - 29 mmol/L    Anion Gap 13 7 - 15 mmol/L    Urea Nitrogen 41.0 (H) 8.0 - 23.0 mg/dL    Creatinine 4.48 (H) 0.51 - 0.95 mg/dL    GFR Estimate 11 (L) >60 mL/min/1.73m2    Calcium 7.9 (L) 8.8 - 10.2 mg/dL    Glucose 122 (H) 70 - 99 mg/dL   CBC with platelets and differential    Collection Time: 12/13/23  8:21 AM   Result Value Ref Range    WBC Count 5.4 4.0 - 11.0 10e3/uL    RBC Count 2.60 (L) 3.80 - 5.20 10e6/uL    Hemoglobin 9.6 (L) 11.7 - 15.7 g/dL    Hematocrit 29.9 (L) 35.0 - 47.0 %     (H) 78 - 100 fL    MCH 36.9 (H) 26.5 - 33.0 pg    MCHC 32.1 31.5 - 36.5 g/dL    RDW 15.9 (H) 10.0 - 15.0 %    Platelet Count 103 (L) 150 - 450 10e3/uL    % Neutrophils 75 %    % Lymphocytes 9 %    % Monocytes 14 %    % Eosinophils 0 %    % Basophils 1 %     % Immature Granulocytes 1 %    NRBCs per 100 WBC 0 <1 /100    Absolute Neutrophils 4.1 1.6 - 8.3 10e3/uL    Absolute Lymphocytes 0.5 (L) 0.8 - 5.3 10e3/uL    Absolute Monocytes 0.7 0.0 - 1.3 10e3/uL    Absolute Eosinophils 0.0 0.0 - 0.7 10e3/uL    Absolute Basophils 0.1 0.0 - 0.2 10e3/uL    Absolute Immature Granulocytes 0.0 <=0.4 10e3/uL    Absolute NRBCs 0.0 10e3/uL   CBC with platelets    Collection Time: 12/19/23 11:38 AM   Result Value Ref Range    WBC Count 2.9 (L) 4.0 - 11.0 10e3/uL    RBC Count 2.92 (L) 3.80 - 5.20 10e6/uL    Hemoglobin 10.4 (L) 11.7 - 15.7 g/dL    Hematocrit 33.5 (L) 35.0 - 47.0 %     (H) 78 - 100 fL    MCH 35.6 (H) 26.5 - 33.0 pg    MCHC 31.0 (L) 31.5 - 36.5 g/dL    RDW 15.7 (H) 10.0 - 15.0 %    Platelet Count 118 (L) 150 - 450 10e3/uL   Magnesium    Collection Time: 12/19/23 11:38 AM   Result Value Ref Range    Magnesium 1.8 1.7 - 2.3 mg/dL   Basic metabolic panel    Collection Time: 12/19/23 11:38 AM   Result Value Ref Range    Sodium 139 135 - 145 mmol/L    Potassium 3.7 3.4 - 5.3 mmol/L    Chloride 98 98 - 107 mmol/L    Carbon Dioxide (CO2) 31 (H) 22 - 29 mmol/L    Anion Gap 10 7 - 15 mmol/L    Urea Nitrogen 20.5 8.0 - 23.0 mg/dL    Creatinine 3.54 (H) 0.51 - 0.95 mg/dL    GFR Estimate 14 (L) >60 mL/min/1.73m2    Calcium 9.0 8.8 - 10.2 mg/dL    Glucose 114 (H) 70 - 99 mg/dL   WBC and Differential    Collection Time: 12/19/23 11:38 AM   Result Value Ref Range    WBC Count 2.9 (L) 4.0 - 11.0 10e3/uL    % Neutrophils 67 %    % Lymphocytes 14 %    % Monocytes 14 %    % Eosinophils 2 %    % Basophils 2 %    % Immature Granulocytes 1 %    NRBCs per 100 WBC 0 <1 /100    Absolute Neutrophils 1.9 1.6 - 8.3 10e3/uL    Absolute Lymphocytes 0.4 (L) 0.8 - 5.3 10e3/uL    Absolute Monocytes 0.4 0.0 - 1.3 10e3/uL    Absolute Eosinophils 0.1 0.0 - 0.7 10e3/uL    Absolute Basophils 0.1 0.0 - 0.2 10e3/uL    Absolute Immature Granulocytes 0.0 <=0.4 10e3/uL    Absolute NRBCs 0.0 10e3/uL   General  PFT Lab (Please always keep checked)    Collection Time: 12/19/23 11:47 AM   Result Value Ref Range    FVC-Pred 2.89 L    FVC-Pre 1.04 L    FVC-%Pred-Pre 36 %    FEV1-Pre 0.89 L    FEV1-%Pred-Pre 38 %    FEV1FVC-Pred 80 %    FEV1FVC-Pre 85 %    FEFMax-Pred 6.24 L/sec    FEFMax-Pre 2.49 L/sec    FEFMax-%Pred-Pre 39 %    FEF2575-Pred 2.11 L/sec    FEF2575-Pre 1.06 L/sec    ACG4245-%Pred-Pre 50 %    ExpTime-Pre 6.62 sec    FIFMax-Pre 2.63 L/sec    FEV1FEV6-Pred 81 %    FEV1FEV6-Pre 87 %     PFT interpretation:  Maneuver: valid and meets ATS guidelines  Severe obstruction based on Z score  Compared to prior: FEV1 of 0.89 is 110 ml above prior  The decrease in FVC may represent air trapping v. restrictive physiology.  Lung volumes would be necessary to determine.

## 2023-12-18 NOTE — PROGRESS NOTES
Virtual Visit Details    Type of service:  Video Visit   Video Start Time: 8:08 AM  Video End Time:8:41 AM    Originating Location (pt. Location): Home    Distant Location (provider location):  On-site  Platform used for Video Visit: [employer].    Palliative Care Outpatient Clinic Consultation Note    Patient:  Kecia Blue    Chief Complaint:   Kecia Blue 63 year old female who is presenting to the palliative medicine clinic today at the request of Ame Chow for a palliative care consultation secondary to EBV viremia in setting of post lung transplant in 2018 and to review goals of care.   The patient's primary care provider is:  Ame Chow.     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 Last bronch on 11/17 with tissue debulking with dilation and suctioning, no stent placement.      1. Recurrent right mainstem bronchial stenosis s/p balloon dilation:  Post-obstructive pneumonia in setting of recurrent RMB stenosis:  Acute hypoxic respiratory failure: with actinomyces (+) BAL 10/17 thought to be contributing to slowly progressive pulmonary symptoms. S/p Zosyn then Augmentin for total of 14 day course. Most recent bronch on 11/17 per above.   - Bronch in 3 months, ~ 2/17/24     2. Bilateral  lung transplant: complicated by above, now with ESLD secondary to likely progressive CLAD. Recent bronch improved symptoms for one day and then had recurrence of dyspnea requiring O2 with ongoing frequent and productive cough. Does not feel sick per se, very fatigued. DSA 10/27 negative. Sating 99% on 2 L O2. CT read pending for today. PFTs declined further.  - 6 MWT and O2 overnight titration study, likely to need more than 2 L  - Referral to pulmonary rehab  - Sputum sample today if able  - Will do steroid burst and taper over 12 days  - Continue albuterol nebs TID-QID; stop mucomyst as it seems to be more irritating than helpful  - Start Mucinex BID; tessalon perles PRN  - Immuknow and tacrolimus pending  - Continue IS including azathioprine (decrease to 25mg daily), tacrolimus and prednsione (hold baseline dose with burst)  - Bactrim for PJP ppx  CLAD: continue azithromycin, singulair, advair (will increase fluticasone dose after oral steroid burst)  - Palliative referral to discuss goals of care     3. Erythroderma:  H/o dermatomyositis: resolved.     4. LUE/LLE swelling:  L breast edema:  Lymphadenopathy: L arm appeared asymmetrically swollen compared to R arm, initially noted 10/27/23. On 10/28, LLE also more swollen and erythematous vs RLE. Edema significant to the point that wedding ring had to be cut off with mechanical saw. LUE US performed 10/27, 10/28, 10/30 all negative for DVT. B/l LE US 10/26, 10/28 negative for DVT. Multiple prominent retroperitoneal and L periaortic lymph nodes noted on CT abdomen 10/28, PTLD not ruled out, per pulm transplant team. CT chest 10/28 with no evidence of PE, but multiple b/l axillary and L retropectoral lymph nodes noted, L breast skin thickening and breast edema of uncertain etiology noted. US LUE AV dialysis fistula 11/1 demonstrating central venous stenosis/occlusion. Fistulogram with central segment venoplasty performed 11/2, brachiocephalic stenosis noted with  multiple chest collaterals, discussed with IR, these findings correlate clinically with her LUE and chest/L breast edema, which have since resolved following venoplasty. Resolved.      5. EBV viremia: last level of 354 on 11/8.   - EBV pending for today     6. CMV viremia: low grade CMV viremia since 8/23. Was started on VGCV three times weekly after HD approx 10/6/23. VGCV dosing increased to daily beginning 10/27 given persisting low-level viremia on repeat CMV levels. Last CMV negative 11/1 and 11/8 negative.   - Valgancyclovir 450 mg twice times weekly, will continue for at least one week after steroid burst/taper     7. Recurrent transient hypotension w/o altered mentation: responsive to albumin and midodrine. Bedside cardiac ultrasound 10/27 with collapsible IVC. Episodic hypotension thought to be within patient's normal range as Kecia was asymptomatic during RRTs. TTE completed 10/27 w/ EF 60-65%. Midodrine discontinued by renal secondary to hypertension. No acute issues.      8. Left-sided aspergillus empyema:   A. Ochraceus: noted in 2019 s/p needle aspiration with Aspergillus fumigatus on cultures s/p intrapleural amphotericin bead placement. Now with A. Ochraceus on BAL from 11/1. Posaconazole indefinitely per ID.  - Continue posaconazole  - Levels every 6 months     9. ESRD on iHD (since 10/2019):  HTN: continues on iHD M/W/F. BP elevated today at 164/89, midodrine discontinued. At this point, pulmonary function precludes Kecia from renal transplant, discussed with patient and her  Ranjan, today.      10. Neutropenia: with ANC of 0.8 today. Suspect secondary to medications.  - Decrease azathioprine to 25 mg daily  - Continue valcyte prophylaxis  - Neupogen today     Chronic issues:  1. Paroxysmal AFib  2. Hypomagnesemia  3. Congestive hepatopathy with fibrosis  4. Osteoporosis'  iHD MWF;     Distressing Symptom/s: no unmet symptom burden at this time; EBV viremia is most pressing concern for Kecia  at this time; she'll next have a viral load measured in ten days; some knee DJD s/p TKA      Patient's Disease Understanding: very good with good communication from pulmonary and hematology teams.    Coping:  family--'amazing' , Ranjan, 3 daughters, and 5 grand children;     Social History  Born: near [place], [place]  Education: [employer] grad; went to [employer] in [place]  Living Situation: [facility] and lives on one floor no one else at home but Ranjan  Relationships:  to Ranjan for 42 years!  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  Hobbies: crafting,   Patient is 'famous for her [employer] shop and being a good grand mother'  Substance Use/History of misuse: none  Financial Concerns: none  Spiritual Background: member of an [employer] in [place], [place]  Spiritual Concerns/Needs: none    Social History     Tobacco Use    Smoking status: Never    Smokeless tobacco: Never   Substance Use Topics    Alcohol use: No     Alcohol/week: 1.0 standard drink of alcohol     Types: 1 Glasses of wine per week    Drug use: No       Family History  Family History   Problem Relation Age of Onset    Hypertension Mother     Arthritis Mother     Pancreatic Cancer Father     Diabetes Father        Advance Care Planning:  Advance Directive:    one present in Epic from 2017  Where is written copy located: home and in [facility]  Health Care Agent Contact Information: , Ranjan  POLST:   n/a  CODE STATUS: FULL CODE    HCD was reviewed.  Kecia says she has reviewed it recently and it reflects her preferences at this time.    Allergies   Allergen Reactions    Isopropyl Alcohol Other (See Comments)     The alcohol burns the open areas on her skin when used as a skin prep for procedures     Vancomycin Other (See Comments)     Diffuse erythroderma with itching (improved with Benadryl) after receiving vancomycin over 1 hour. 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 Can use vancomycin in the future, but please give over slower infusion time (at least 2 hours) and premedicate with Benadryl.     Current Outpatient Medications   Medication Sig Dispense Refill    acetaminophen (TYLENOL) 325 MG tablet Take 1 tablet (325 mg) by mouth every 4 hours as needed for mild pain or fever 60 tablet     acetylcysteine (MUCOMYST) 10 % nebulizer solution Inhale 4 mLs into the lungs 3 times daily as needed for mucolysis/respiratory distress      albuterol (PROVENTIL) (2.5 MG/3ML) 0.083% neb solution Take 1 vial (2.5 mg) by nebulization 4 times daily      azaTHIOprine (IMURAN) 50 MG tablet Take 1 tablet (50 mg) by mouth daily On hold 11/23/23 per Dr. Orona due to EBV viremia      azithromycin (ZITHROMAX) 250 MG tablet Take 1 tablet (250 mg) by mouth daily      benzonatate (TESSALON) 100 MG capsule Take 1 capsule (100 mg) by mouth every 4 hours as needed for cough 30 capsule 0    cholecalciferol 50 MCG (2000 UT) CAPS Take 1 capsule by mouth daily On dialysis days (MWF) (Patient not taking: Reported on 11/29/2023)      fluticasone-salmeterol (ADVAIR) 250-50 MCG/ACT inhaler Inhale 1 puff into the lungs every 12 hours 60 each 11    guaiFENesin (MUCINEX) 600 MG 12 hr tablet Take 2 tablets (1,200 mg) by mouth 2 times daily 120 tablet 3    Magnesium Cl-Calcium Carbonate (SLOW-MAG) 71.5-119 MG TBEC Take 2 tablets by mouth four times a week Take on non dialysis days T/Th/Sa/Su 90 tablet 3    montelukast (SINGULAIR) 10 MG tablet Take 1 tablet (10 mg) by mouth At Bedtime 30 tablet 11    pantoprazole (PROTONIX) 40 MG EC tablet Take 1 tablet (40 mg) by mouth every morning (before breakfast) 30 tablet 11    posaconazole (NOXAFIL) 100 MG EC tablet Take 3 tablets  (300 mg) by mouth daily 90 tablet 11    predniSONE (DELTASONE) 5 MG tablet Take 1 tablet (5 mg) by mouth daily 30 tablet 11    sulfamethoxazole-trimethoprim (BACTRIM) 400-80 MG tablet Take 1 tablet by mouth three times a week (Patient taking differently: Take 1 tablet by mouth three times a week MWF) 13 tablet 11    tacrolimus (GENERIC) 0.5 MG capsule Take 1 capsule (0.5 mg) by mouth every morning AND 2 capsules (1 mg) every evening.      valGANciclovir (VALCYTE) 450 MG tablet Take 1 tablet (450 mg) by mouth twice a week Monday and Fridays after dialysis 10 tablet 0     Past Medical History:   Diagnosis Date    Acute on chronic respiratory failure with hypoxia (H) 02/21/2018    Anisocoria     Antisynthetase syndrome (H24) 2014    Anxiety     Aspergillus (H)     Aspergillus pneumonia (H) 11/20/2020    Benign essential hypertension     C. difficile colitis     Cytomegalovirus (CMV) viremia (H)     Dermatomyositis (H)     Dysplasia of cervix, low grade (ESTRADA 1)     EBV (Franklin-Barr virus) viremia     ESRD (end stage renal disease) on dialysis (H)     ILD (interstitial lung disease) (H)     Lung replaced by transplant (H)     Osteopenia     Paroxysmal atrial fibrillation (H)     Pneumocystis jiroveci pneumonia (H)     PONV (postoperative nausea and vomiting)     Post-menopause     Pulmonary hypertension (H)     Raynaud's disease     Seronegative rheumatoid arthritis (H)      Past Surgical History:   Procedure Laterality Date    BRONCHOSCOPY (RIGID OR FLEXIBLE), DIAGNOSTIC N/A 4/10/2018    Procedure: COMBINED BRONCHOSCOPY (RIGID OR FLEXIBLE), LAVAGE;;  Surgeon: Mariposa Donohue MD;  [facility]    BRONCHOSCOPY (RIGID OR FLEXIBLE), DIAGNOSTIC N/A 12/23/2020    Procedure: BRONCHOSCOPY, WITH BRONCHOALVEOLAR LAVAGE;  Surgeon: Uri Bass MD;  [facility]    BRONCHOSCOPY (RIGID OR FLEXIBLE), DIAGNOSTIC N/A 5/26/2022    Procedure: BRONCHOSCOPY, WITH BRONCHOALVEOLAR LAVAGE;  Surgeon: Uri Bass MD;   [facility]    BRONCHOSCOPY (RIGID OR FLEXIBLE), DIAGNOSTIC N/A 8/17/2023    Procedure: BRONCHOSCOPY, WITH BRONCHOALVEOLAR LAVAGE;  Surgeon: Esau Bruno MD;  [facility]    BRONCHOSCOPY (RIGID OR FLEXIBLE), DIAGNOSTIC N/A 11/1/2023    Procedure: BRONCHOSCOPY, WITH BRONCHOALVEOLAR LAVAGE;  Surgeon: Beto Magaña DO;  [facility]    BRONCHOSCOPY (RIGID OR FLEXIBLE), DILATE BRONCHUS / TRACHEA N/A 10/11/2018    Procedure: BRONCHOSCOPY (RIGID OR FLEXIBLE), DILATE BRONCHUS / TRACHEA;  Flexible And Rigid Bronchoscopy and Dilation;  Surgeon: Wilber Lin MD;  [facility]    BRONCHOSCOPY (RIGID OR FLEXIBLE), DILATE BRONCHUS / TRACHEA N/A 11/1/2023    Procedure: Bronchoscopy (Rigid Or Flexible), Dilate Bronchus / Trachea;  Surgeon: Beot Magaña DO;  [facility]    BRONCHOSCOPY FLEXIBLE N/A 3/13/2018    Procedure: BRONCHOSCOPY FLEXIBLE;  Flexible Bronchoscopy ;  Surgeon: Gissell Sanchez MD;  [facility]    BRONCHOSCOPY FLEXIBLE N/A 5/9/2018    Procedure: BRONCHOSCOPY FLEXIBLE;;  Surgeon: Wilber Lin MD;  [facility]    BRONCHOSCOPY FLEXIBLE AND RIGID N/A 9/10/2018    Procedure: BRONCHOSCOPY FLEXIBLE AND RIGID;  Flexible and Rigid Bronchoscopy with Balloon Dilation, tissue debulking with cryo, and Right mainstem bronchus stent placement;  Surgeon: Wilber Lin MD;  [facility]    BRONCHOSCOPY RIGID N/A 6/6/2018    Procedure: BRONCHOSCOPY RIGID;;  Surgeon: Lopez Macias MD;  [facility]    BRONCHOSCOPY, DILATE BRONCHUS, STENT BRONCHUS, COMBINED N/A 6/11/2018    Procedure: COMBINED BRONCHOSCOPY, DILATE BRONCHUS, STENT BRONCHUS;  Flexible Bronchoscopy, Balloon Dilation, Bronchial Washings;  Surgeon: Wilber Lin MD;  [facility]    BRONCHOSCOPY, DILATE BRONCHUS, STENT BRONCHUS, COMBINED Right 7/10/2018    Procedure: COMBINED BRONCHOSCOPY, DILATE BRONCHUS, STENT BRONCHUS;  Flexible Bronchoscopy, Balloon Dilation,  Bronchial Washings  ;  Surgeon: Wilber Lin MD;  [facility]    BRONCHOSCOPY, DILATE BRONCHUS, STENT BRONCHUS, COMBINED N/A 8/2/2018    Procedure: COMBINED BRONCHOSCOPY, DILATE BRONCHUS, STENT BRONCHUS;  Flexible Bronchoscopy, Bronchial Washings, Balloon Dilation;  Surgeon: Wilber Lin MD;  [facility]    BRONCHOSCOPY, DILATE BRONCHUS, STENT BRONCHUS, COMBINED N/A 8/20/2018    Procedure: COMBINED BRONCHOSCOPY, DILATE BRONCHUS, STENT BRONCHUS;  Flexible Bronchoscopy, Balloon Dilation;  Surgeon: Wilber Lin MD;  [facility]    BRONCHOSCOPY, DILATE BRONCHUS, STENT BRONCHUS, COMBINED N/A 10/29/2018    Procedure: Flexible Bronchoscopy, Balloon Dilation, Stent Revision, Airway Examination And Therapeutic Suctioning, Cyro Tumor Debulking;  Surgeon: Wilber Lin MD;  [facility]    BRONCHOSCOPY, DILATE BRONCHUS, STENT BRONCHUS, COMBINED N/A 11/20/2018    Procedure: Rigid Bronchoscopy, Stent Removal and dilitation;  Surgeon: Wilber Lin MD;  [facility]    BRONCHOSCOPY, DILATE BRONCHUS, STENT BRONCHUS, COMBINED N/A 12/14/2018    Procedure: Flexible And Rigid Bronchoscopy, Balloon Dilation, Bronchial Washing;  Surgeon: Wilber Lin MD;  [facility]    BRONCHOSCOPY, DILATE BRONCHUS, STENT BRONCHUS, COMBINED N/A 1/17/2019    Procedure: Flexible And Rigid Bronchoscopy, Balloon Dilation.;  Surgeon: Wilber Lin MD;  [facility]    BRONCHOSCOPY, DILATE BRONCHUS, STENT BRONCHUS, COMBINED N/A 3/7/2019    Procedure: Flexible and Rigid Bronchoscopy, Bronchial Washing, Balloon Dilation;  Surgeon: Wilber Lin MD;  [facility]    BRONCHOSCOPY, DILATE BRONCHUS, STENT BRONCHUS, COMBINED N/A 6/6/2019    Procedure: Rigid and Flexible Bronchoscopy, Balloon Dilation;  Surgeon: Wilber Lin MD;  [facility]    BRONCHOSCOPY, DILATE BRONCHUS, STENT BRONCHUS, COMBINED N/A 10/11/2019    Procedure: Flexible and Rigid  Bronchoscopy, Balloon Dilation, Bronchoalveolar Lagave;  Surgeon: Wilber Lin MD;  [facility]    BRONCHOSCOPY, DILATE BRONCHUS, STENT BRONCHUS, COMBINED N/A 2/19/2021    Procedure: BRONCHOSCOPY, flexible, airway dilation, and bronchial wash;  Surgeon: Wilber Lin MD;  [facility]    BRONCHOSCOPY, DILATE BRONCHUS, STENT BRONCHUS, COMBINED N/A 4/9/2021    Procedure: BRONCHOSCOPY, flexible and rigid, Airway suctioning;  Surgeon: Mati Norris MD;  [facility]    BRONCHOSCOPY, DILATE BRONCHUS, STENT BRONCHUS, COMBINED N/A 10/17/2023    Procedure: RIGID, flexible bronchoscopy with airway dilation;  Surgeon: Preet Patel MD;  [facility]    CV RIGHT HEART CATH MEASUREMENTS RECORDED N/A 3/10/2020    Procedure: CV RIGHT HEART CATH;  Surgeon: Wai Garcia MD;  [facility]    ENT SURGERY      tonsillectomy as a child    ESOPHAGOSCOPY, GASTROSCOPY, DUODENOSCOPY (EGD), COMBINED N/A 10/29/2018    Procedure: COMBINED ESOPHAGOSCOPY, GASTROSCOPY, DUODENOSCOPY (EGD) with biopsies and polypectomy;  Surgeon: Chente Bloom MD;  [facility]    INSERT EXTRACORPORAL MEMBRANE OXYGENATOR ADULT (OUTSIDE OR) N/A 2/27/2018    Procedure: INSERT EXTRACORPORAL MEMBRANE OXYGENATOR ADULT (OUTSIDE OR);  INSERT EXTRACORPORAL MEMBRANE OXYGENATOR ADULT (OUTSIDE OR) ;  Surgeon: Toby Hernandez MD;  [facility]    IR CVC TUNNEL PLACEMENT > 5 YRS OF AGE  10/25/2019    IR DIALYSIS FISTULOGRAM LEFT  3/2/2021    IR DIALYSIS FISTULOGRAM LEFT  11/2/2023    IR DIALYSIS MECH THROMB, PTA  3/2/2021    IR FLUORO 0-1 HOUR  5/7/2021    IR GASTRO JEJUNOSTOMY TUBE PLACEMENT  2/16/2021    IR PARACENTESIS  1/8/2020    IR THORACENTESIS  9/13/2019    IR TRANSCATHETER BIOPSY  1/8/2020    LASER CO2 BRONCHOSCOPY N/A 4/30/2021    Procedure: Flexible and Rigid Bronchoscopy and Tissue Debulking with CO2 Laser Assistance;  Surgeon: Mati Norris MD;  [facility]     LASER CO2 BRONCHOSCOPY N/A 6/11/2021    Procedure: BRONCHOSCOPY, Flexible and Rigid Bronchoscopy, Tissue Debulking with cryo Assistance, airway dilation,;  Surgeon: Mati Norris MD;  [facility]    LASER CO2 BRONCHOSCOPY N/A 9/16/2021    Procedure: BRONCHOSCOPY, flexible and rigid, CO2 Laser Debulking;  Surgeon: Mati Norris MD;  [facility]    LASER CO2 BRONCHOSCOPY N/A 2/11/2022    Procedure: flexible, rigid bronchoscopy, tissue debulking, airway dilation, co2 laser, bronchoalveolar lavage;  Surgeon: Juana Baugh MD;  [facility]    LASER CO2 BRONCHOSCOPY Right 11/17/2023    Procedure: flexible bronchosocopy, tissue/tumor debulking, using CO2 laser, mitomycin application and argon plasma coagulation;  Surgeon: Mati Norris MD;  [facility]    no prior surgery      REMOVE EXTRACORPORAL MEMBRANE OXYGENATOR ADULT N/A 3/3/2018    Procedure: REMOVE EXTRACORPORAL MEMBRANE OXYGENATOR ADULT;  Removal of Right Femoral Venous and Right Axillary Arterial Extracorporeal Membrane Oxygenator;  Surgeon: Toby Hernandez MD;  [facility]    TRANSPLANT LUNG RECIPIENT SINGLE X2 Bilateral 3/1/2018    Procedure: TRANSPLANT LUNG RECIPIENT SINGLE X2;  Median Sternotomy, Extracorporeal Membrane Oxygenator, bilateral sequential lung transplant;  Surgeon: Toby Hernandez MD;  [facility]       REVIEW OF SYSTEMS:   ROS: 10 point ROS neg other than the symptoms noted above in the HPI and here:  Palliative Symptom Review (0=no symptom/no concern, 1=mild, 2=moderate, 3=severe):      Pain: 0      Fatigue: 1 sharmaine on iHD days      Nausea: 0      Constipation: 0      Diarrhea: 0      Depressive Symptoms: 0      Anxiety: 0      Drowsiness: 0      Poor Appetite: 0      Shortness of Breath: 1 using 2 lpm by NC and can increase if needed like after showering      Insomnia: 0      Cough:  slightly improved during the day; coughs a lot in the morning; and is usually productive of clear sputum;        Overall (0 good/no concerns, 3 very poor):  1    GENERAL APPEARANCE: healthy, alert and no distress; neatly groomed; using supplemental O2 via NC  EYES: Eyes grossly normal to inspection, PERRLA, conjunctivae and sclerae without injection or discharge, EOM intact   RESP:  no increased work of breathing; speaks in complete sentences;   MS: No musculoskeletal defects are noted  SKIN: No suspicious lesions or rashes, hydration status appears adequate with normal skin turgor   PSYCH: Alert and oriented x3; speech- coherent , normal rate and volume; able to articulate logical thoughts, able to abstract reason, no tangential thoughts, no hallucinations or delusions, mentation appears normal, Mood is euthymic. Affect is appropriate for this mood state and bright. Thought content is free of suicidal ideation, hallucinations, and delusions.  Eye contact is good during conversation.       Data Reviewed:  LABS: most recent results Cr 4.48; alb 3.8; Hgb 9.6;  IMAGIN2023 PET ONCOLOGY (EYES TO THIGHS)    IMPRESSION:   Bilateral lung transplants again noted.     Subsegmental atelectasis/scarring within the mid to lower lung zones, without   significant increased radiotracer accumulation relative to background.     Mild diffuse increased radiotracer accumulation throughout the axial and   appendicular skeleton, a nonspecific finding and may be related to stimulation.   Clinical correlation is recommended and bone marrow biopsy can be considered as   clinically relevant.     Pleural thickening along the anterior aspect of the left upper lobe without   significant increased radiotracer accumulation however is new since 2021   and may related to evolving postoperative changes.     Potential mildly dilated lymphatics or less likely lymph nodes along the left   aspect of the abdominal aorta, and without increased radiotracer accumulation.     Left rectus abdominus hematoma.     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        Recommendations & Counseling:  No new medications to add--current symptom burden seems well managed  F/up in 2 months to continue GOC conversation (our visit started late today and had to end early so Kecia could get to a dialysis appointment.)    Counseling: All of the above was explained to the patient in lay language. The patient has verbalized a clear understanding of the discussion, asked appropriate questions, which have been answered to patient's apparent satisfaction. The patient is in agreement with the above plan.    62 minutes spent on the date of the encounter doing chart review, history and exam, patient education & counseling, documentation and other activities as noted above.    Case Rey MD MS FAAFP CAQHPM  [facility]  Office [phone]  Fax [phone]

## 2023-12-18 NOTE — PATIENT INSTRUCTIONS
It was good to see you today, Brady.  Radha Tijerina!  I'm sorry we had to cut our visit short today.    Here are the things we talked about:  I don't have any new medicines to add to your regimen.    Keep using the guaifenesin and tessalon for your cough.  If it becomes more bothersome we can consider a low dose of morphine in the future.    Someone from the team will reach out to schedule a follow up appointment in 2 months and I can always see you sooner, if needed.    Here are some Tips for Conserving Your Energy that I've collected from other patients over the years.  Serious illnesses  can beaccompanied by feelings of extreme fatigue. To help you during the times you feel tired, these easy tips help conserve the energy you do have.   Activities of Daily Living   Plan ahead to avoid rushing.  Sit down to bathe and dry off. Wear a aneesh robe instead of drying off.   Use a shower/bath organizer to decrease leaning and reaching.   Use extension handles on sponges and brushes.   Install grab rails inthe bathroom or use an elevated toilet seat.   Lay out clothes and toiletries before dressing.   Minimize leaning over to put on clothes and shoes. Bring your foot to your knee to apply socks and shoes. Fasten bra infront then turn to back.   Modify your home to maximize efficient energy use. For example, place chairs strategically to allow for rest stops -- for instance, along a long hallway.   Wear comfortable shoes andlow-heeled, slip on shoes. Wear button front shirts rather than pullovers.   Housekeeping   Schedule household tasks throughout the week.   Do housework sitting down when possible. Use long-handleddusters, dust mops, etc. Use a wheeled cart or wilkinson's apron to carry supplies.   Delegate heavy housework, shopping, laundry and childcare when possible.   Drag or slide objects rather than lifting. If you do needto lift an object, use your leg muscles rather than your back muscles.   Sit when  ironing and take rest periods.   Stop working before becoming overly tired.   Shopping   Organize list by [place].   Use a grocery cart for support.   Shop at less busy times.   Ask for help in getting to the car.   Buy clothes that don't require ironing.   Meal Preparation   Use convenience and easy-to-preparefoods.   Use small appliances that take less effort to use.   Arrange the preparation environment for easy access to frequently used items.   Prepare meals sitting down.   Soak dishes instead of scrubbing and letdishes air dry.   Prepare double portions and freeze half.   Plan activities that can be done sitting down, such as drawing pictures, playing games, reading, and computer games.   Encourage children to climb up ontoyour lap or into the highchair instead of being lifted.   Make a game of the household chores so that children will want to help.   Delegate childcare when possible.   Workplace   Plan workload totake advantage of peak energy times. Alternate physically demanding tasks with less demanding tasks.   Arrange work environment for easy access to commonly used equipment and supplies.   Leisure Do activities with acompanion.   Select activities that match your energy level. Balance activity and rest. Don't get over-tired.     How to get a hold of us:  For non-urgent matters, MyChart works best.    For more urgent matters, or if you prefer not to use MyChart, call our clinic nurse coordinator Yudelka Fitzgerald RN at [phone]    We have an on-call number for evenings and weekends. Please call this only if you are having uncontrolled symptoms or serious side effects from your medicines: [phone].     For refills, please give us a week (5 working days) notice. We don't always have providers available everyday to do refills. If you call the day you run out of your medicine, we may not be able to refill it in time, so call 5 days in advance!    Case Rey MD MS [facility]FP CAB[facility]  [facility]  Office [phone]  Fax [phone]

## 2023-12-18 NOTE — LETTER
12/18/2023       RE: Kecia Blue  [address] [place] [address]       Dear Colleague,    Thank you for referring your patient, Kecia Blue, to the [facility] at [facility]. Please see a copy of my visit note below.      Palliative Care Outpatient Clinic Consultation Note    Patient:  Kecia Blue    Chief Complaint:   Kecia Blue 63 year old female who is presenting to the palliative medicine clinic today at the request of Ame Chow for a palliative care consultation secondary to EBV viremia in setting of post lung transplant in 2018 and to review goals of care.   The patient's primary care provider is:  Ame Chow.     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 Last bronch on 11/17 with tissue debulking with dilation and suctioning, no stent placement.      1. Recurrent right mainstem bronchial stenosis s/p balloon dilation:  Post-obstructive pneumonia in setting of recurrent RMB stenosis:  Acute hypoxic respiratory failure: with actinomyces (+) BAL 10/17 thought to be contributing to slowly progressive pulmonary symptoms. S/p Zosyn then Augmentin for total of 14 day course. Most  recent bronch on 11/17 per above.   - Bronch in 3 months, ~ 2/17/24     2. Bilateral lung transplant: complicated by above, now with ESLD secondary to likely progressive CLAD. Recent bronch improved symptoms for one day and then had recurrence of dyspnea requiring O2 with ongoing frequent and productive cough. Does not feel sick per se, very fatigued. DSA 10/27 negative. Sating 99% on 2 L O2. CT read pending for today. PFTs declined further.  - 6 MWT and O2 overnight titration study, likely to need more than 2 L  - Referral to pulmonary rehab  - Sputum sample today if able  - Will do steroid burst and taper over 12 days  - Continue albuterol nebs TID-QID; stop mucomyst as it seems to be more irritating than helpful  - Start Mucinex BID; tessalon perles PRN  - Immuknow and tacrolimus pending  - Continue IS including azathioprine (decrease to 25mg daily), tacrolimus and prednsione (hold baseline dose with burst)  - Bactrim for PJP ppx  CLAD: continue azithromycin, singulair, advair (will increase fluticasone dose after oral steroid burst)  - Palliative referral to discuss goals of care     3. Erythroderma:  H/o dermatomyositis: resolved.     4. LUE/LLE swelling:  L breast edema:  Lymphadenopathy: L arm appeared asymmetrically swollen compared to R arm, initially noted 10/27/23. On 10/28, LLE also more swollen and erythematous vs RLE. Edema significant to the point that wedding ring had to be cut off with mechanical saw. LUE US performed 10/27, 10/28, 10/30 all negative for DVT. B/l LE US 10/26, 10/28 negative for DVT. Multiple prominent retroperitoneal and L periaortic lymph nodes noted on CT abdomen 10/28, PTLD not ruled out, per pulm transplant team. CT chest 10/28 with no evidence of PE, but multiple b/l axillary and L retropectoral lymph nodes noted, L breast skin thickening and breast edema of uncertain etiology noted. US LUE AV dialysis fistula 11/1 demonstrating central venous stenosis/occlusion. Fistulogram  with central segment venoplasty performed 11/2, brachiocephalic stenosis noted with multiple chest collaterals, discussed with IR, these findings correlate clinically with her LUE and chest/L breast edema, which have since resolved following venoplasty. Resolved.      5. EBV viremia: last level of 354 on 11/8.   - EBV pending for today     6. CMV viremia: low grade CMV viremia since 8/23. Was started on VGCV three times weekly after HD approx 10/6/23. VGCV dosing increased to daily beginning 10/27 given persisting low-level viremia on repeat CMV levels. Last CMV negative 11/1 and 11/8 negative.   - Valgancyclovir 450 mg twice times weekly, will continue for at least one week after steroid burst/taper     7. Recurrent transient hypotension w/o altered mentation: responsive to albumin and midodrine. Bedside cardiac ultrasound 10/27 with collapsible IVC. Episodic hypotension thought to be within patient's normal range as Kecia was asymptomatic during RRTs. TTE completed 10/27 w/ EF 60-65%. Midodrine discontinued by renal secondary to hypertension. No acute issues.      8. Left-sided aspergillus empyema:   A. Ochraceus: noted in 2019 s/p needle aspiration with Aspergillus fumigatus on cultures s/p intrapleural amphotericin bead placement. Now with A. Ochraceus on BAL from 11/1. Posaconazole indefinitely per ID.  - Continue posaconazole  - Levels every 6 months     9. ESRD on iHD (since 10/2019):  HTN: continues on iHD M/W/F. BP elevated today at 164/89, midodrine discontinued. At this point, pulmonary function precludes Kecia from renal transplant, discussed with patient and her  Ranjan, today.      10. Neutropenia: with ANC of 0.8 today. Suspect secondary to medications.  - Decrease azathioprine to 25 mg daily  - Continue valcyte prophylaxis  - Neupogen today     Chronic issues:  1. Paroxysmal AFib  2. Hypomagnesemia  3. Congestive hepatopathy with fibrosis  4. Osteoporosis'  iHD MWF;     Distressing Symptom/s:  no unmet symptom burden at this time; EBV viremia is most pressing concern for Kecia at this time; she'll next have a viral load measured in ten days; some knee DJD s/p TKA      Patient's Disease Understanding: very good with good communication from pulmonary and hematology teams.    Coping:  family--'amazing' , Ranjan, 3 daughters, and 5 grand children;     Social History  Born: near [place], [place]  Education: [employer] grad; went to [employer] in [place]  Living Situation: [facility] and lives on one floor no one else at home but Ranjan  Relationships:  to Ranjan for 42 years!  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  Hobbies: crafting,   Patient is 'famous for her [employer] and being a good grand mother'  Substance Use/History of misuse: none  Financial Concerns: none  Spiritual Background: member of an [employer] in [place], [place]  Spiritual Concerns/Needs: none    Social History     Tobacco Use    Smoking status: Never    Smokeless tobacco: Never   Substance Use Topics    Alcohol use: No     Alcohol/week: 1.0 standard drink of alcohol     Types: 1 Glasses of wine per week    Drug use: No       Family History  Family History   Problem Relation Age of Onset    Hypertension Mother     Arthritis Mother     Pancreatic Cancer Father     Diabetes Father        Advance Care Planning:  Advance Directive:    one present in Epic from 2017  Where is written copy located: home and in [facility]  Health Care Agent Contact Information: , Ranjan  POLST:   n/a  CODE STATUS: FULL CODE    HCD was reviewed.  Kecia says she has reviewed it recently and it reflects her preferences at this time.    Allergies   Allergen Reactions    Isopropyl Alcohol Other (See Comments)     The  alcohol burns the open areas on her skin when used as a skin prep for procedures    Vancomycin Other (See Comments)     Diffuse erythroderma with itching (improved with Benadryl) after receiving vancomycin over 1 hour. 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 Can use vancomycin in the future, but please give over slower infusion time (at least 2 hours) and premedicate with Benadryl.     Current Outpatient Medications   Medication Sig Dispense Refill    acetaminophen (TYLENOL) 325 MG tablet Take 1 tablet (325 mg) by mouth every 4 hours as needed for mild pain or fever 60 tablet     acetylcysteine (MUCOMYST) 10 % nebulizer solution Inhale 4 mLs into the lungs 3 times daily as needed for mucolysis/respiratory distress      albuterol (PROVENTIL) (2.5 MG/3ML) 0.083% neb solution Take 1 vial (2.5 mg) by nebulization 4 times daily      azaTHIOprine (IMURAN) 50 MG tablet Take 1 tablet (50 mg) by mouth daily On hold 11/23/23 per Dr. Orona due to EBV viremia      azithromycin (ZITHROMAX) 250 MG tablet Take 1 tablet (250 mg) by mouth daily      benzonatate (TESSALON) 100 MG capsule Take 1 capsule (100 mg) by mouth every 4 hours as needed for cough 30 capsule 0    cholecalciferol 50 MCG (2000 UT) CAPS Take 1 capsule by mouth daily On dialysis days (MWF) (Patient not taking: Reported on 11/29/2023)      fluticasone-salmeterol (ADVAIR) 250-50 MCG/ACT inhaler Inhale 1 puff into the lungs every 12 hours 60 each 11    guaiFENesin (MUCINEX) 600 MG 12 hr tablet Take 2 tablets (1,200 mg) by mouth 2 times daily 120 tablet 3    Magnesium Cl-Calcium Carbonate (SLOW-MAG) 71.5-119 MG TBEC Take 2 tablets by mouth four times a week Take on non dialysis days T/Th/Sa/Su 90 tablet 3    montelukast (SINGULAIR) 10 MG tablet Take 1 tablet (10 mg) by mouth At Bedtime 30 tablet 11    pantoprazole (PROTONIX) 40 MG EC tablet Take 1 tablet (40 mg) by mouth every morning (before  breakfast) 30 tablet 11    posaconazole (NOXAFIL) 100 MG EC tablet Take 3 tablets (300 mg) by mouth daily 90 tablet 11    predniSONE (DELTASONE) 5 MG tablet Take 1 tablet (5 mg) by mouth daily 30 tablet 11    sulfamethoxazole-trimethoprim (BACTRIM) 400-80 MG tablet Take 1 tablet by mouth three times a week (Patient taking differently: Take 1 tablet by mouth three times a week MWF) 13 tablet 11    tacrolimus (GENERIC) 0.5 MG capsule Take 1 capsule (0.5 mg) by mouth every morning AND 2 capsules (1 mg) every evening.      valGANciclovir (VALCYTE) 450 MG tablet Take 1 tablet (450 mg) by mouth twice a week Monday and Fridays after dialysis 10 tablet 0     Past Medical History:   Diagnosis Date    Acute on chronic respiratory failure with hypoxia (H) 02/21/2018    Anisocoria     Antisynthetase syndrome (H24) 2014    Anxiety     Aspergillus (H)     Aspergillus pneumonia (H) 11/20/2020    Benign essential hypertension     C. difficile colitis     Cytomegalovirus (CMV) viremia (H)     Dermatomyositis (H)     Dysplasia of cervix, low grade (ESTRADA 1)     EBV (Franklin-Barr virus) viremia     ESRD (end stage renal disease) on dialysis (H)     ILD (interstitial lung disease) (H)     Lung replaced by transplant (H)     Osteopenia     Paroxysmal atrial fibrillation (H)     Pneumocystis jiroveci pneumonia (H)     PONV (postoperative nausea and vomiting)     Post-menopause     Pulmonary hypertension (H)     Raynaud's disease     Seronegative rheumatoid arthritis (H)      Past Surgical History:   Procedure Laterality Date    BRONCHOSCOPY (RIGID OR FLEXIBLE), DIAGNOSTIC N/A 4/10/2018    Procedure: COMBINED BRONCHOSCOPY (RIGID OR FLEXIBLE), LAVAGE;;  Surgeon: Mariposa Donohue MD;  Location:  GI    BRONCHOSCOPY (RIGID OR FLEXIBLE), DIAGNOSTIC N/A 12/23/2020    Procedure: BRONCHOSCOPY, WITH BRONCHOALVEOLAR LAVAGE;  Surgeon: Uri Bass MD;  Location:  GI    BRONCHOSCOPY (RIGID OR FLEXIBLE), DIAGNOSTIC N/A 5/26/2022     Procedure: BRONCHOSCOPY, WITH BRONCHOALVEOLAR LAVAGE;  Surgeon: Uri Bass MD;  [facility]    BRONCHOSCOPY (RIGID OR FLEXIBLE), DIAGNOSTIC N/A 8/17/2023    Procedure: BRONCHOSCOPY, WITH BRONCHOALVEOLAR LAVAGE;  Surgeon: Esau Bruno MD;  [facility]: [facility]    BRONCHOSCOPY (RIGID OR FLEXIBLE), DIAGNOSTIC N/A 11/1/2023    Procedure: BRONCHOSCOPY, WITH BRONCHOALVEOLAR LAVAGE;  Surgeon: Beto Magaña DO;  [facility]: [facility]    BRONCHOSCOPY (RIGID OR FLEXIBLE), DILATE BRONCHUS / TRACHEA N/A 10/11/2018    Procedure: BRONCHOSCOPY (RIGID OR FLEXIBLE), DILATE BRONCHUS / TRACHEA;  Flexible And Rigid Bronchoscopy and Dilation;  Surgeon: Wilber Lin MD;  [facility]    BRONCHOSCOPY (RIGID OR FLEXIBLE), DILATE BRONCHUS / TRACHEA N/A 11/1/2023    Procedure: Bronchoscopy (Rigid Or Flexible), Dilate Bronchus / Trachea;  Surgeon: Beto Magaña DO;  [facility]: [facility]    BRONCHOSCOPY FLEXIBLE N/A 3/13/2018    Procedure: BRONCHOSCOPY FLEXIBLE;  Flexible Bronchoscopy ;  Surgeon: Gissell Sanchez MD;  [facility]    BRONCHOSCOPY FLEXIBLE N/A 5/9/2018    Procedure: BRONCHOSCOPY FLEXIBLE;;  Surgeon: Wilber Lin MD;  [facility]    BRONCHOSCOPY FLEXIBLE AND RIGID N/A 9/10/2018    Procedure: BRONCHOSCOPY FLEXIBLE AND RIGID;  Flexible and Rigid Bronchoscopy with Balloon Dilation, tissue debulking with cryo, and Right mainstem bronchus stent placement;  Surgeon: Wilber Lin MD;  [facility]    BRONCHOSCOPY RIGID N/A 6/6/2018    Procedure: BRONCHOSCOPY RIGID;;  Surgeon: Lopez Macias MD;  [facility]    BRONCHOSCOPY, DILATE BRONCHUS, STENT BRONCHUS, COMBINED N/A 6/11/2018    Procedure: COMBINED BRONCHOSCOPY, DILATE BRONCHUS, STENT BRONCHUS;  Flexible Bronchoscopy, Balloon Dilation, Bronchial Washings;  Surgeon: Wilber Lin MD;  [facility]    BRONCHOSCOPY, DILATE BRONCHUS, STENT BRONCHUS, COMBINED Right 7/10/2018    Procedure: COMBINED  BRONCHOSCOPY, DILATE BRONCHUS, STENT BRONCHUS;  Flexible Bronchoscopy, Balloon Dilation, Bronchial Washings  ;  Surgeon: Wilber Lin MD;  [facility]    BRONCHOSCOPY, DILATE BRONCHUS, STENT BRONCHUS, COMBINED N/A 8/2/2018    Procedure: COMBINED BRONCHOSCOPY, DILATE BRONCHUS, STENT BRONCHUS;  Flexible Bronchoscopy, Bronchial Washings, Balloon Dilation;  Surgeon: Wilber Lin MD;  [facility]    BRONCHOSCOPY, DILATE BRONCHUS, STENT BRONCHUS, COMBINED N/A 8/20/2018    Procedure: COMBINED BRONCHOSCOPY, DILATE BRONCHUS, STENT BRONCHUS;  Flexible Bronchoscopy, Balloon Dilation;  Surgeon: Wilber Lin MD;  [facility]    BRONCHOSCOPY, DILATE BRONCHUS, STENT BRONCHUS, COMBINED N/A 10/29/2018    Procedure: Flexible Bronchoscopy, Balloon Dilation, Stent Revision, Airway Examination And Therapeutic Suctioning, Cyro Tumor Debulking;  Surgeon: Wilber Lin MD;  [facility]    BRONCHOSCOPY, DILATE BRONCHUS, STENT BRONCHUS, COMBINED N/A 11/20/2018    Procedure: Rigid Bronchoscopy, Stent Removal and dilitation;  Surgeon: Wilber Lin MD;  [facility]    BRONCHOSCOPY, DILATE BRONCHUS, STENT BRONCHUS, COMBINED N/A 12/14/2018    Procedure: Flexible And Rigid Bronchoscopy, Balloon Dilation, Bronchial Washing;  Surgeon: Wilber Lin MD;  [facility]    BRONCHOSCOPY, DILATE BRONCHUS, STENT BRONCHUS, COMBINED N/A 1/17/2019    Procedure: Flexible And Rigid Bronchoscopy, Balloon Dilation.;  Surgeon: Wilber Lin MD;  [facility]    BRONCHOSCOPY, DILATE BRONCHUS, STENT BRONCHUS, COMBINED N/A 3/7/2019    Procedure: Flexible and Rigid Bronchoscopy, Bronchial Washing, Balloon Dilation;  Surgeon: Wilber Lin MD;  [facility]    BRONCHOSCOPY, DILATE BRONCHUS, STENT BRONCHUS, COMBINED N/A 6/6/2019    Procedure: Rigid and Flexible Bronchoscopy, Balloon Dilation;  Surgeon: Wilber Lin MD;  [facility]    BRONCHOSCOPY,  DILATE BRONCHUS, STENT BRONCHUS, COMBINED N/A 10/11/2019    Procedure: Flexible and Rigid Bronchoscopy, Balloon Dilation, Bronchoalveolar Lagave;  Surgeon: Wilber Lin MD;  [facility]    BRONCHOSCOPY, DILATE BRONCHUS, STENT BRONCHUS, COMBINED N/A 2/19/2021    Procedure: BRONCHOSCOPY, flexible, airway dilation, and bronchial wash;  Surgeon: Wilber Lin MD;  [facility]    BRONCHOSCOPY, DILATE BRONCHUS, STENT BRONCHUS, COMBINED N/A 4/9/2021    Procedure: BRONCHOSCOPY, flexible and rigid, Airway suctioning;  Surgeon: Mati Norris MD;  [facility]    BRONCHOSCOPY, DILATE BRONCHUS, STENT BRONCHUS, COMBINED N/A 10/17/2023    Procedure: RIGID, flexible bronchoscopy with airway dilation;  Surgeon: Preet Patel MD;  [facility]    CV RIGHT HEART CATH MEASUREMENTS RECORDED N/A 3/10/2020    Procedure: CV RIGHT HEART CATH;  Surgeon: Wai Garcia MD;  [facility]    ENT SURGERY      tonsillectomy as a child    ESOPHAGOSCOPY, GASTROSCOPY, DUODENOSCOPY (EGD), COMBINED N/A 10/29/2018    Procedure: COMBINED ESOPHAGOSCOPY, GASTROSCOPY, DUODENOSCOPY (EGD) with biopsies and polypectomy;  Surgeon: Chente Bloom MD;  [facility]    INSERT EXTRACORPORAL MEMBRANE OXYGENATOR ADULT (OUTSIDE OR) N/A 2/27/2018    Procedure: INSERT EXTRACORPORAL MEMBRANE OXYGENATOR ADULT (OUTSIDE OR);  INSERT EXTRACORPORAL MEMBRANE OXYGENATOR ADULT (OUTSIDE OR) ;  Surgeon: Toby Hernandez MD;  [facility]    IR CVC TUNNEL PLACEMENT > 5 YRS OF AGE  10/25/2019    IR DIALYSIS FISTULOGRAM LEFT  3/2/2021    IR DIALYSIS FISTULOGRAM LEFT  11/2/2023    IR DIALYSIS MECH THROMB, PTA  3/2/2021    IR FLUORO 0-1 HOUR  5/7/2021    IR GASTRO JEJUNOSTOMY TUBE PLACEMENT  2/16/2021    IR PARACENTESIS  1/8/2020    IR THORACENTESIS  9/13/2019    IR TRANSCATHETER BIOPSY  1/8/2020    LASER CO2 BRONCHOSCOPY N/A 4/30/2021    Procedure: Flexible and Rigid Bronchoscopy and  Tissue Debulking with CO2 Laser Assistance;  Surgeon: Mati Norris MD;  [facility]    LASER CO2 BRONCHOSCOPY N/A 6/11/2021    Procedure: BRONCHOSCOPY, Flexible and Rigid Bronchoscopy, Tissue Debulking with cryo Assistance, airway dilation,;  Surgeon: Mati Norris MD;  [facility]    LASER CO2 BRONCHOSCOPY N/A 9/16/2021    Procedure: BRONCHOSCOPY, flexible and rigid, CO2 Laser Debulking;  Surgeon: Mati Norris MD;  [facility]    LASER CO2 BRONCHOSCOPY N/A 2/11/2022    Procedure: flexible, rigid bronchoscopy, tissue debulking, airway dilation, co2 laser, bronchoalveolar lavage;  Surgeon: Juana Baugh MD;  [facility]    LASER CO2 BRONCHOSCOPY Right 11/17/2023    Procedure: flexible bronchosocopy, tissue/tumor debulking, using CO2 laser, mitomycin application and argon plasma coagulation;  Surgeon: Mati Norris MD;  [facility]    no prior surgery      REMOVE EXTRACORPORAL MEMBRANE OXYGENATOR ADULT N/A 3/3/2018    Procedure: REMOVE EXTRACORPORAL MEMBRANE OXYGENATOR ADULT;  Removal of Right Femoral Venous and Right Axillary Arterial Extracorporeal Membrane Oxygenator;  Surgeon: Toby Hernandez MD;  [facility]    TRANSPLANT LUNG RECIPIENT SINGLE X2 Bilateral 3/1/2018    Procedure: TRANSPLANT LUNG RECIPIENT SINGLE X2;  Median Sternotomy, Extracorporeal Membrane Oxygenator, bilateral sequential lung transplant;  Surgeon: Toby Hernandez MD;  [facility]       REVIEW OF SYSTEMS:   ROS: 10 point ROS neg other than the symptoms noted above in the HPI and here:  Palliative Symptom Review (0=no symptom/no concern, 1=mild, 2=moderate, 3=severe):      Pain: 0      Fatigue: 1 sharmaine on iHD days      Nausea: 0      Constipation: 0      Diarrhea: 0      Depressive Symptoms: 0      Anxiety: 0      Drowsiness: 0      Poor Appetite: 0      Shortness of Breath: 1 using 2 lpm by NC and can increase if needed like after showering      Insomnia: 0      Cough:  slightly  improved during the day; coughs a lot in the morning; and is usually productive of clear sputum;       Overall (0 good/no concerns, 3 very poor):  1    GENERAL APPEARANCE: healthy, alert and no distress; neatly groomed; using supplemental O2 via NC  EYES: Eyes grossly normal to inspection, PERRLA, conjunctivae and sclerae without injection or discharge, EOM intact   RESP:  no increased work of breathing; speaks in complete sentences;   MS: No musculoskeletal defects are noted  SKIN: No suspicious lesions or rashes, hydration status appears adequate with normal skin turgor   PSYCH: Alert and oriented x3; speech- coherent , normal rate and volume; able to articulate logical thoughts, able to abstract reason, no tangential thoughts, no hallucinations or delusions, mentation appears normal, Mood is euthymic. Affect is appropriate for this mood state and bright. Thought content is free of suicidal ideation, hallucinations, and delusions.  Eye contact is good during conversation.       Data Reviewed:  LABS: most recent results Cr 4.48; alb 3.8; Hgb 9.6;  IMAGIN2023 PET ONCOLOGY (EYES TO THIGHS)    IMPRESSION:   Bilateral lung transplants again noted.     Subsegmental atelectasis/scarring within the mid to lower lung zones, without   significant increased radiotracer accumulation relative to background.     Mild diffuse increased radiotracer accumulation throughout the axial and   appendicular skeleton, a nonspecific finding and may be related to stimulation.   Clinical correlation is recommended and bone marrow biopsy can be considered as   clinically relevant.     Pleural thickening along the anterior aspect of the left upper lobe without   significant increased radiotracer accumulation however is new since 2021   and may related to evolving postoperative changes.     Potential mildly dilated lymphatics or less likely lymph nodes along the left   aspect of the abdominal aorta, and without increased  radiotracer accumulation.     Left rectus abdominus hematoma.     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        Recommendations & Counseling:  No new medications to add--current symptom burden seems well managed  F/up in 2 months to continue GOC conversation (our visit started late today and had to end early so Kecia could get to a dialysis appointment.)    Counseling: All of the above was explained to the patient in lay language. The patient has verbalized a clear understanding of the discussion, asked appropriate questions, which have been answered to patient's apparent satisfaction. The patient is in agreement with the above plan.    62 minutes spent on the date of the encounter doing chart review, history and exam, patient education & counseling, documentation and other activities as noted above.             Again, thank you for allowing me to participate in the care of your patient.      Sincerely,    Case Rey MD

## 2023-12-18 NOTE — NURSING NOTE
Is the patient currently in the state of [place]? YES    Visit mode:VIDEO    If the visit is dropped, the patient can be reconnected by: VIDEO VISIT: Text to cell phone:   Telephone Information:   Mobile [phone]       Will anyone else be joining the visit? NO  (If patient encounters technical issues they should call [phone])    How would you like to obtain your AVS? MyChart    Are changes needed to the allergy or medication list? Pt stated no changes to allergies and Pt stated no med changes No changes since last seen in clinic on 12/13    Reason for visit: Consult    Juana Perez LPN

## 2023-12-19 NOTE — PROGRESS NOTES
Transplant Coordinator Note    Reason for visit: Post lung transplant follow up visit   Coordinator: Present   Caregiver: Ranjan    Health concerns addressed today:  1. Resp: coughing still in the morning. Will start pulmonary rehab in early February.   2. Overall feeling better, able to get more stuff done.   3. Saw palliative, will see again in February.     Activity/rehab: Walking everyday for 5-10 minutes.   Oxygen needs: 2L NC continuous.   Pain management/RX: Denies  High risk donor: Yes  CMV status: D+/R+  DVT/PE: H/o DVT  Post op AFIB/follow up with EP: One time occurrence of a-fib  PJP prophylactic: Bactrim     COVID:  COVID-19 infection (yes/no, date of most recent positive test):   Status/instructions given about COVID-19 vaccine:      Pt Education: medications (use/dose/side effects), how/when to call coordinator, frequency of labs, s/s of infection/rejection, call prior to starting any new medications, lab/vital sign book     Health Maintenance:   Last colonoscopy: 7/25/23  Next colonoscopy due: 10 years   Dermatology:   Vaccinations this visit: None     Labs, CXR, PFTs reviewed with patient  Medication record reviewed and reconciled  Questions and concerns addressed     Patient Instructions  1. Continue to hydrate with 60-70 oz fluids daily.  2. Continue to exercise daily or most days of the week.  3. Follow up with your primary care provider for annual gender health maintenance procedures.  4. Follow up with colonoscopy schedule.  5. Follow up with annual dermatology visits.  6. 6 minute walk today to see how much oxygen you need.   7. If your CMV is negative today, we'll stop your Valcyte.   8. Talk to your nephrologist about your renal vitamin and phosphorus binder.  9. Mikayla will fax an order for PT to do for now until you can start pulmonary rehab.    10. Start Atrovent nasal spray at bedtime.   11. See dermatology for your skin.     Next transplant clinic appointment: February before OR bronch  with CXR, labs and PFTs  Next lab draw: weekly x1 month CMV checks     AVS printed at time of check out

## 2023-12-19 NOTE — PATIENT INSTRUCTIONS
Patient Instructions  1. Continue to hydrate with 60-70 oz fluids daily.  2. Continue to exercise daily or most days of the week.  3. Follow up with your primary care provider for annual gender health maintenance procedures.  4. Follow up with colonoscopy schedule.  5. Follow up with annual dermatology visits.  6. 6 minute walk today to see how much oxygen you need.   7. If your CMV is negative today, we'll stop your Valcyte.   8. Talk to your nephrologist about your renal vitamin and phosphorus binder.  9. Mikayla will fax an order for PT to do for now until you can start pulmonary rehab.   10. Start Atrovent nasal spray.   11. See dermatology for your skin.     Next transplant clinic appointment: February before OR bronch with CXR, labs and PFTs  Next lab draw: weekly x1 month CMV checks

## 2023-12-19 NOTE — RESULT ENCOUNTER NOTE
"Per mAe:   \"I would have her continue to use her O2 for PT and exercise/activity as I think she will feel better with it.     We can repeat a walk test in 3-6 months.\"    Message sent to patient. "

## 2023-12-19 NOTE — LETTER
12/19/2023         RE: Kecia Blue  [address] [place] [address]        Dear Colleague,    Thank you for referring your patient, Kecia Blue, to the [facility] [place]. Please see a copy of my visit note below.    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 Last bronch on 11/17 with tissue debulking with dilation and suctioning, no stent placement.     1. Recurrent right mainstem bronchial stenosis s/p balloon dilation: doing well, much better activity likely related to her EBV treatment. Active around the house, wondering if she still needs her O2.  - 6 MWT today on room air with O2 titration as necessary  - Bronch in 3 months, ~ 2/17/24, will schedule next follow up prior to OR    2. 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 Overall improved, interested in starting PT as a bridge to pulmonary rehab the beginning of February. Sating 100% on 2L. DSA 10/27 negative. Immunknow of 169 on 11/21. CXR reviewed by me demonstrates stable changes of transplant. PFTs improved  110 ml today from prior.  - PT now with plan to start  pulmonary rehab in February.   - Continue albuterol nebs PRN and Mucinex BID  - Continue IS including tacrolimus and prednisone; AZA on hold for EBV, also with Immunknow per above  - Bactrim for PJP ppx  CLAD: continue azithromycin, singulair, advair  - Following with Palliative care for GOC     3. EBV viremia: level peaked at 1 million on 11/21, underwent PET scan and saw Heme/Onc and is s/p rituxan last week. Recheck was down to 559K on 12/13.   - EBV pending for today    4. CMV viremia: low grade CMV viremia since 8/23. Was started on VGCV three times weekly after HD approx 10/6/23. VGCV dosing increased to daily beginning 10/27 given persisting low-level viremia on repeat CMV levels. CMV has been negative x 1 after steroid burst/taper.   - If CMV is negative today, will stop valgancyclovir and monitor weekly    5. Left-sided aspergillus empyema:   A. Ochraceus: noted in 2019 s/p needle aspiration with Aspergillus fumigatus on cultures s/p intrapleural amphotericin bead placement. Now with A. Ochraceus on BAL from 11/1. Posaconazole indefinitely per ID. Last level of 1.4 on 11/3.   - Continue posaconazole  - Levels every 6 months     6. ESRD on iHD (since 10/2019):  HTN: continues on iHD M/W/F. BP improved today. At this point, pulmonary function precludes Kecia from renal transplant, will continue to monitor pulmonary function for improvement.     7. Leukopenia: WBC of 2.9 today, likely related to valcyte and recent rituxan.   - AZA on hold  - CMV monitoring per above, will recheck next week     Chronic issues:  1. Paroxysmal AFib  2. Hypomagnesemia  3. Congestive hepatopathy with fibrosis  4. Osteoporosis    RTC: 2 months prior to OR bronch  Vaccinations: got flu, covid and RSV vaccine  Preventative: colonoscopy due 2033; mammogram in February negative; DEXA > May 2024; following with Derm, needs follow up    Ame Chow PA-C  Pulmonary, Allergy, Critical Care and Sleep Medicine        Interval History:      S/p rituxan last week, follow up EBV level of 559. Better energy, can get some work done around the house. Using 2 L O2 at home, had a walk test scheduled. No fever or chills, does have a cough in the am, feels like it could be related to PND at night. Otherwise cough is baseline and productive, no blood. No congestion, tightness or wheezing, using 2L O2 at night as well. No headaches, no chest pain or palpitations. No bloating or gas, stools are stable.           Review of Systems:   Please see HPI, otherwise the complete 10 point ROS is negative.           Past Medical and Surgical History:     Past Medical History:   Diagnosis Date     Acute on chronic respiratory failure with hypoxia (H) 02/21/2018     Anisocoria      Antisynthetase syndrome (H24) 2014     Anxiety      Aspergillus (H)      Aspergillus pneumonia (H) 11/20/2020     Benign essential hypertension      C. difficile colitis      Cytomegalovirus (CMV) viremia (H)      Dermatomyositis (H)      Dysplasia of cervix, low grade (ESTRADA 1)      EBV (Franklin-Barr virus) viremia      ESRD (end stage renal disease) on dialysis (H)      ILD (interstitial lung disease) (H)      Lung replaced by transplant (H)      Osteopenia      Paroxysmal atrial fibrillation (H)      Pneumocystis jiroveci pneumonia (H)      PONV (postoperative nausea and vomiting)      Post-menopause      Pulmonary hypertension (H)      Raynaud's disease      Seronegative rheumatoid arthritis (H)      Past Surgical History:   Procedure Laterality Date     BRONCHOSCOPY (RIGID OR FLEXIBLE), DIAGNOSTIC N/A 4/10/2018    Procedure: COMBINED BRONCHOSCOPY (RIGID OR FLEXIBLE), LAVAGE;;  Surgeon: Mariposa Donohue MD;  [facility]     BRONCHOSCOPY (RIGID OR FLEXIBLE), DIAGNOSTIC N/A 12/23/2020    Procedure: BRONCHOSCOPY, WITH BRONCHOALVEOLAR LAVAGE;  Surgeon: Uri Bass MD;  [facility]     BRONCHOSCOPY (RIGID OR FLEXIBLE), DIAGNOSTIC N/A 5/26/2022    Procedure: BRONCHOSCOPY, WITH  BRONCHOALVEOLAR LAVAGE;  Surgeon: Uri Bass MD;  [facility]     BRONCHOSCOPY (RIGID OR FLEXIBLE), DIAGNOSTIC N/A 8/17/2023    Procedure: BRONCHOSCOPY, WITH BRONCHOALVEOLAR LAVAGE;  Surgeon: Esau Bruno MD;  [facility]     BRONCHOSCOPY (RIGID OR FLEXIBLE), DIAGNOSTIC N/A 11/1/2023    Procedure: BRONCHOSCOPY, WITH BRONCHOALVEOLAR LAVAGE;  Surgeon: Beto Magaña DO;  [facility]     BRONCHOSCOPY (RIGID OR FLEXIBLE), DILATE BRONCHUS / TRACHEA N/A 10/11/2018    Procedure: BRONCHOSCOPY (RIGID OR FLEXIBLE), DILATE BRONCHUS / TRACHEA;  Flexible And Rigid Bronchoscopy and Dilation;  Surgeon: Wilber Lin MD;  [facility]     BRONCHOSCOPY (RIGID OR FLEXIBLE), DILATE BRONCHUS / TRACHEA N/A 11/1/2023    Procedure: Bronchoscopy (Rigid Or Flexible), Dilate Bronchus / Trachea;  Surgeon: Beto Magaña DO;  [facility]     BRONCHOSCOPY FLEXIBLE N/A 3/13/2018    Procedure: BRONCHOSCOPY FLEXIBLE;  Flexible Bronchoscopy ;  Surgeon: Gissell Sanchez MD;  [facility]     BRONCHOSCOPY FLEXIBLE N/A 5/9/2018    Procedure: BRONCHOSCOPY FLEXIBLE;;  Surgeon: Wilber Lin MD;  [facility]     BRONCHOSCOPY FLEXIBLE AND RIGID N/A 9/10/2018    Procedure: BRONCHOSCOPY FLEXIBLE AND RIGID;  Flexible and Rigid Bronchoscopy with Balloon Dilation, tissue debulking with cryo, and Right mainstem bronchus stent placement;  Surgeon: Wilber Lin MD;  [facility]     BRONCHOSCOPY RIGID N/A 6/6/2018    Procedure: BRONCHOSCOPY RIGID;;  Surgeon: Lopez Macias MD;  [facility]     BRONCHOSCOPY, DILATE BRONCHUS, STENT BRONCHUS, COMBINED N/A 6/11/2018    Procedure: COMBINED BRONCHOSCOPY, DILATE BRONCHUS, STENT BRONCHUS;  Flexible Bronchoscopy, Balloon Dilation, Bronchial Washings;  Surgeon: Wilber Lin MD;  [facility]     BRONCHOSCOPY, DILATE BRONCHUS, STENT BRONCHUS, COMBINED Right 7/10/2018    Procedure: COMBINED BRONCHOSCOPY, DILATE  BRONCHUS, STENT BRONCHUS;  Flexible Bronchoscopy, Balloon Dilation, Bronchial Washings  ;  Surgeon: Wilber Lin MD;  [facility]     BRONCHOSCOPY, DILATE BRONCHUS, STENT BRONCHUS, COMBINED N/A 8/2/2018    Procedure: COMBINED BRONCHOSCOPY, DILATE BRONCHUS, STENT BRONCHUS;  Flexible Bronchoscopy, Bronchial Washings, Balloon Dilation;  Surgeon: Wilber Lin MD;  [facility]     BRONCHOSCOPY, DILATE BRONCHUS, STENT BRONCHUS, COMBINED N/A 8/20/2018    Procedure: COMBINED BRONCHOSCOPY, DILATE BRONCHUS, STENT BRONCHUS;  Flexible Bronchoscopy, Balloon Dilation;  Surgeon: Wilber Lin MD;  [facility]     BRONCHOSCOPY, DILATE BRONCHUS, STENT BRONCHUS, COMBINED N/A 10/29/2018    Procedure: Flexible Bronchoscopy, Balloon Dilation, Stent Revision, Airway Examination And Therapeutic Suctioning, Cyro Tumor Debulking;  Surgeon: Wilber Lin MD;  [facility]     BRONCHOSCOPY, DILATE BRONCHUS, STENT BRONCHUS, COMBINED N/A 11/20/2018    Procedure: Rigid Bronchoscopy, Stent Removal and dilitation;  Surgeon: Wilber Lin MD;  [facility]     BRONCHOSCOPY, DILATE BRONCHUS, STENT BRONCHUS, COMBINED N/A 12/14/2018    Procedure: Flexible And Rigid Bronchoscopy, Balloon Dilation, Bronchial Washing;  Surgeon: Wilber Lin MD;  [facility]     BRONCHOSCOPY, DILATE BRONCHUS, STENT BRONCHUS, COMBINED N/A 1/17/2019    Procedure: Flexible And Rigid Bronchoscopy, Balloon Dilation.;  Surgeon: Wilber Lin MD;  [facility]     BRONCHOSCOPY, DILATE BRONCHUS, STENT BRONCHUS, COMBINED N/A 3/7/2019    Procedure: Flexible and Rigid Bronchoscopy, Bronchial Washing, Balloon Dilation;  Surgeon: Wilber Lin MD;  [facility]     BRONCHOSCOPY, DILATE BRONCHUS, STENT BRONCHUS, COMBINED N/A 6/6/2019    Procedure: Rigid and Flexible Bronchoscopy, Balloon Dilation;  Surgeon: Wilber Lin MD;  [facility]     BRONCHOSCOPY, DILATE BRONCHUS,  STENT BRONCHUS, COMBINED N/A 10/11/2019    Procedure: Flexible and Rigid Bronchoscopy, Balloon Dilation, Bronchoalveolar Lagave;  Surgeon: Wilber Lin MD;  [facility]     BRONCHOSCOPY, DILATE BRONCHUS, STENT BRONCHUS, COMBINED N/A 2/19/2021    Procedure: BRONCHOSCOPY, flexible, airway dilation, and bronchial wash;  Surgeon: Wilber Lin MD;  [facility]     BRONCHOSCOPY, DILATE BRONCHUS, STENT BRONCHUS, COMBINED N/A 4/9/2021    Procedure: BRONCHOSCOPY, flexible and rigid, Airway suctioning;  Surgeon: Mati Norris MD;  [facility]     BRONCHOSCOPY, DILATE BRONCHUS, STENT BRONCHUS, COMBINED N/A 10/17/2023    Procedure: RIGID, flexible bronchoscopy with airway dilation;  Surgeon: Preet Patel MD;  [facility]     CV RIGHT HEART CATH MEASUREMENTS RECORDED N/A 3/10/2020    Procedure: CV RIGHT HEART CATH;  Surgeon: Wai Garcia MD;  [facility]     ENT SURGERY      tonsillectomy as a child     ESOPHAGOSCOPY, GASTROSCOPY, DUODENOSCOPY (EGD), COMBINED N/A 10/29/2018    Procedure: COMBINED ESOPHAGOSCOPY, GASTROSCOPY, DUODENOSCOPY (EGD) with biopsies and polypectomy;  Surgeon: Chente Bloom MD;  [facility]     INSERT EXTRACORPORAL MEMBRANE OXYGENATOR ADULT (OUTSIDE OR) N/A 2/27/2018    Procedure: INSERT EXTRACORPORAL MEMBRANE OXYGENATOR ADULT (OUTSIDE OR);  INSERT EXTRACORPORAL MEMBRANE OXYGENATOR ADULT (OUTSIDE OR) ;  Surgeon: Toby Hernandez MD;  [facility]     IR CVC TUNNEL PLACEMENT > 5 YRS OF AGE  10/25/2019     IR DIALYSIS FISTULOGRAM LEFT  3/2/2021     IR DIALYSIS FISTULOGRAM LEFT  11/2/2023     IR DIALYSIS MECH THROMB, PTA  3/2/2021     IR FLUORO 0-1 HOUR  5/7/2021     IR GASTRO JEJUNOSTOMY TUBE PLACEMENT  2/16/2021     IR PARACENTESIS  1/8/2020     IR THORACENTESIS  9/13/2019     IR TRANSCATHETER BIOPSY  1/8/2020     LASER CO2 BRONCHOSCOPY N/A 4/30/2021    Procedure: Flexible and Rigid Bronchoscopy and  Tissue Debulking with CO2 Laser Assistance;  Surgeon: Mati Norris MD;  [facility]     LASER CO2 BRONCHOSCOPY N/A 6/11/2021    Procedure: BRONCHOSCOPY, Flexible and Rigid Bronchoscopy, Tissue Debulking with cryo Assistance, airway dilation,;  Surgeon: Mati Norris MD;  [facility]     LASER CO2 BRONCHOSCOPY N/A 9/16/2021    Procedure: BRONCHOSCOPY, flexible and rigid, CO2 Laser Debulking;  Surgeon: Mati Norris MD;  [facility]     LASER CO2 BRONCHOSCOPY N/A 2/11/2022    Procedure: flexible, rigid bronchoscopy, tissue debulking, airway dilation, co2 laser, bronchoalveolar lavage;  Surgeon: Juana Baugh MD;  [facility]     LASER CO2 BRONCHOSCOPY Right 11/17/2023    Procedure: flexible bronchosocopy, tissue/tumor debulking, using CO2 laser, mitomycin application and argon plasma coagulation;  Surgeon: Mati Norris MD;  [facility]     no prior surgery       REMOVE EXTRACORPORAL MEMBRANE OXYGENATOR ADULT N/A 3/3/2018    Procedure: REMOVE EXTRACORPORAL MEMBRANE OXYGENATOR ADULT;  Removal of Right Femoral Venous and Right Axillary Arterial Extracorporeal Membrane Oxygenator;  Surgeon: Toby Hernandez MD;  [facility]     TRANSPLANT LUNG RECIPIENT SINGLE X2 Bilateral 3/1/2018    Procedure: TRANSPLANT LUNG RECIPIENT SINGLE X2;  Median Sternotomy, Extracorporeal Membrane Oxygenator, bilateral sequential lung transplant;  Surgeon: Toby Hernandez MD;  [facility]           Family History:     Family History   Problem Relation Age of Onset     Hypertension Mother      Arthritis Mother      Pancreatic Cancer Father      Diabetes Father             Social History:     Social History     Socioeconomic History     Marital status:      Spouse name: Not on file     Number of children: Not on file     Years of education: Not on file     Highest education level: Not on file   Occupational History     Not on file   Tobacco Use     Smoking status: Never      Smokeless tobacco: Never   Substance and Sexual Activity     Alcohol use: No     Alcohol/week: 1.0 standard drink of alcohol     Types: 1 Glasses of wine per week     Drug use: No     Sexual activity: Not on file   Other Topics Concern     Parent/sibling w/ CABG, MI or angioplasty before 65F 55M? No   Social History Narrative    3/6/2019 - Lives with . Has three daughters. Four grandchildren (two ). No pets. Travelled previously to [facility]. Has visited [place] several times.      Social Determinants of Health     Financial Resource Strain: Not on file   Food Insecurity: Not on file   Transportation Needs: Not on file   Physical Activity: Not on file   Stress: Not on file   Social Connections: Not on file   Interpersonal Safety: Not on file   Housing Stability: Not on file            Medications:     Current Outpatient Medications   Medication     albuterol (PROVENTIL) (2.5 MG/3ML) 0.083% neb solution     ipratropium (ATROVENT) 0.06 % nasal spray     acetaminophen (TYLENOL) 325 MG tablet     acetylcysteine (MUCOMYST) 10 % nebulizer solution     azaTHIOprine (IMURAN) 50 MG tablet     azithromycin (ZITHROMAX) 250 MG tablet     benzonatate (TESSALON) 100 MG capsule     cholecalciferol 50 MCG ( UT) CAPS     fluticasone-salmeterol (ADVAIR) 250-50 MCG/ACT inhaler     guaiFENesin (MUCINEX) 600 MG 12 hr tablet     Magnesium Cl-Calcium Carbonate (SLOW-MAG) 71.5-119 MG TBEC     montelukast (SINGULAIR) 10 MG tablet     pantoprazole (PROTONIX) 40 MG EC tablet     posaconazole (NOXAFIL) 100 MG EC tablet     predniSONE (DELTASONE) 5 MG tablet     sulfamethoxazole-trimethoprim (BACTRIM) 400-80 MG tablet     tacrolimus (GENERIC) 0.5 MG capsule     valGANciclovir (VALCYTE) 450 MG tablet     No current facility-administered medications for this visit.            Physical Exam:   /76   Pulse 96   LMP 2014 (Exact Date)   SpO2 100%     GENERAL: alert, NAD  HEENT: NCAT, EOMI, no scleral icterus,  oral mucosa moist and without lesions  Neck: no cervical or supraclavicular adenopathy  Lungs: moderate airflow, scattered crackles and rhonchi  CV: irregular rhythm, S1S2, no murmurs noted  Abdomen: normoactive BS, soft  Lymph: no edema  Neuro: AAO X 3, CN 2-12 grossly intact  Psychiatric: normal affect, good eye contact  Skin: no rash, jaundice or lesions on limited exam         Data:   All laboratory and imaging data reviewed.      Recent Results (from the past 168 hour(s))   EBV DNA PCR Quantitative Whole Blood    Collection Time: 12/13/23  8:21 AM   Result Value Ref Range    EBV DNA Copies/mL 559,942 (H) <=0 copies/mL    EBV log 5.7    Hepatitis B surface antigen    Collection Time: 12/13/23  8:21 AM   Result Value Ref Range    Hepatitis B Surface Antigen Nonreactive Nonreactive   Hepatitis B Surface Antibody    Collection Time: 12/13/23  8:21 AM   Result Value Ref Range    Hepatitis B Surface Antibody Instrument Value 0.92 <8.00 m[IU]/mL    Hepatitis B Surface Antibody Nonreactive    Hepatitis B core antibody    Collection Time: 12/13/23  8:21 AM   Result Value Ref Range    Hepatitis B Core Antibody Total Nonreactive Nonreactive   Tacrolimus by Tandem Mass Spectrometry    Collection Time: 12/13/23  8:21 AM   Result Value Ref Range    Tacrolimus by Tandem Mass Spectrometry 5.9 5.0 - 15.0 ug/L    Tacrolimus Last Dose Date 12/12/2023     Tacrolimus Last Dose Time  7:45 PM    Basic metabolic panel    Collection Time: 12/13/23  8:21 AM   Result Value Ref Range    Sodium 140 135 - 145 mmol/L    Potassium 4.3 3.4 - 5.3 mmol/L    Chloride 98 98 - 107 mmol/L    Carbon Dioxide (CO2) 29 22 - 29 mmol/L    Anion Gap 13 7 - 15 mmol/L    Urea Nitrogen 41.0 (H) 8.0 - 23.0 mg/dL    Creatinine 4.48 (H) 0.51 - 0.95 mg/dL    GFR Estimate 11 (L) >60 mL/min/1.73m2    Calcium 7.9 (L) 8.8 - 10.2 mg/dL    Glucose 122 (H) 70 - 99 mg/dL   CBC with platelets and differential    Collection Time: 12/13/23  8:21 AM   Result Value Ref Range     WBC Count 5.4 4.0 - 11.0 10e3/uL    RBC Count 2.60 (L) 3.80 - 5.20 10e6/uL    Hemoglobin 9.6 (L) 11.7 - 15.7 g/dL    Hematocrit 29.9 (L) 35.0 - 47.0 %     (H) 78 - 100 fL    MCH 36.9 (H) 26.5 - 33.0 pg    MCHC 32.1 31.5 - 36.5 g/dL    RDW 15.9 (H) 10.0 - 15.0 %    Platelet Count 103 (L) 150 - 450 10e3/uL    % Neutrophils 75 %    % Lymphocytes 9 %    % Monocytes 14 %    % Eosinophils 0 %    % Basophils 1 %    % Immature Granulocytes 1 %    NRBCs per 100 WBC 0 <1 /100    Absolute Neutrophils 4.1 1.6 - 8.3 10e3/uL    Absolute Lymphocytes 0.5 (L) 0.8 - 5.3 10e3/uL    Absolute Monocytes 0.7 0.0 - 1.3 10e3/uL    Absolute Eosinophils 0.0 0.0 - 0.7 10e3/uL    Absolute Basophils 0.1 0.0 - 0.2 10e3/uL    Absolute Immature Granulocytes 0.0 <=0.4 10e3/uL    Absolute NRBCs 0.0 10e3/uL   CBC with platelets    Collection Time: 12/19/23 11:38 AM   Result Value Ref Range    WBC Count 2.9 (L) 4.0 - 11.0 10e3/uL    RBC Count 2.92 (L) 3.80 - 5.20 10e6/uL    Hemoglobin 10.4 (L) 11.7 - 15.7 g/dL    Hematocrit 33.5 (L) 35.0 - 47.0 %     (H) 78 - 100 fL    MCH 35.6 (H) 26.5 - 33.0 pg    MCHC 31.0 (L) 31.5 - 36.5 g/dL    RDW 15.7 (H) 10.0 - 15.0 %    Platelet Count 118 (L) 150 - 450 10e3/uL   Magnesium    Collection Time: 12/19/23 11:38 AM   Result Value Ref Range    Magnesium 1.8 1.7 - 2.3 mg/dL   Basic metabolic panel    Collection Time: 12/19/23 11:38 AM   Result Value Ref Range    Sodium 139 135 - 145 mmol/L    Potassium 3.7 3.4 - 5.3 mmol/L    Chloride 98 98 - 107 mmol/L    Carbon Dioxide (CO2) 31 (H) 22 - 29 mmol/L    Anion Gap 10 7 - 15 mmol/L    Urea Nitrogen 20.5 8.0 - 23.0 mg/dL    Creatinine 3.54 (H) 0.51 - 0.95 mg/dL    GFR Estimate 14 (L) >60 mL/min/1.73m2    Calcium 9.0 8.8 - 10.2 mg/dL    Glucose 114 (H) 70 - 99 mg/dL   WBC and Differential    Collection Time: 12/19/23 11:38 AM   Result Value Ref Range    WBC Count 2.9 (L) 4.0 - 11.0 10e3/uL    % Neutrophils 67 %    % Lymphocytes 14 %    % Monocytes 14 %    %  Eosinophils 2 %    % Basophils 2 %    % Immature Granulocytes 1 %    NRBCs per 100 WBC 0 <1 /100    Absolute Neutrophils 1.9 1.6 - 8.3 10e3/uL    Absolute Lymphocytes 0.4 (L) 0.8 - 5.3 10e3/uL    Absolute Monocytes 0.4 0.0 - 1.3 10e3/uL    Absolute Eosinophils 0.1 0.0 - 0.7 10e3/uL    Absolute Basophils 0.1 0.0 - 0.2 10e3/uL    Absolute Immature Granulocytes 0.0 <=0.4 10e3/uL    Absolute NRBCs 0.0 10e3/uL   General PFT Lab (Please always keep checked)    Collection Time: 12/19/23 11:47 AM   Result Value Ref Range    FVC-Pred 2.89 L    FVC-Pre 1.04 L    FVC-%Pred-Pre 36 %    FEV1-Pre 0.89 L    FEV1-%Pred-Pre 38 %    FEV1FVC-Pred 80 %    FEV1FVC-Pre 85 %    FEFMax-Pred 6.24 L/sec    FEFMax-Pre 2.49 L/sec    FEFMax-%Pred-Pre 39 %    FEF2575-Pred 2.11 L/sec    FEF2575-Pre 1.06 L/sec    JKX4288-%Pred-Pre 50 %    ExpTime-Pre 6.62 sec    FIFMax-Pre 2.63 L/sec    FEV1FEV6-Pred 81 %    FEV1FEV6-Pre 87 %     PFT interpretation:  Maneuver: valid and meets ATS guidelines  Severe obstruction based on Z score  Compared to prior: FEV1 of 0.89 is 110 ml above prior  The decrease in FVC may represent air trapping v. restrictive physiology.  Lung volumes would be necessary to determine.    Transplant Coordinator Note    Reason for visit: Post lung transplant follow up visit   Coordinator: Present   Caregiver: Ranjan    [facility] concerns addressed today:  1. Resp: coughing still in the morning. Will start pulmonary rehab in early February.   2. Overall feeling better, able to get more stuff done.   3. Saw palliative, will see again in February.     Activity/rehab: Walking everyday for 5-10 minutes.   Oxygen needs: 2L NC continuous.   Pain management/RX: Denies  High risk donor: Yes  CMV status: D+/R+  DVT/PE: H/o DVT  Post op AFIB/follow up with EP: One time occurrence of a-fib  PJP prophylactic: Bactrim     COVID:  COVID-19 infection (yes/no, date of most recent positive test):   Status/instructions given about COVID-19 vaccine:      Pt  Education: medications (use/dose/side effects), how/when to call coordinator, frequency of labs, s/s of infection/rejection, call prior to starting any new medications, lab/vital sign book     Health Maintenance:   Last colonoscopy: 7/25/23  Next colonoscopy due: 10 years   Dermatology:   Vaccinations this visit: None     Labs, CXR, PFTs reviewed with patient  Medication record reviewed and reconciled  Questions and concerns addressed     Patient Instructions  1. Continue to hydrate with 60-70 oz fluids daily.  2. Continue to exercise daily or most days of the week.  3. Follow up with your primary care provider for annual gender health maintenance procedures.  4. Follow up with colonoscopy schedule.  5. Follow up with annual dermatology visits.  6. 6 minute walk today to see how much oxygen you need.   7. If your CMV is negative today, we'll stop your Valcyte.   8. Talk to your nephrologist about your renal vitamin and phosphorus binder.  9. Mikayla will fax an order for PT to do for now until you can start pulmonary rehab.    10. Start Atrovent nasal spray at bedtime.   11. See dermatology for your skin.     Next transplant clinic appointment: February before OR bronch with CXR, labs and PFTs  Next lab draw: weekly x1 month CMV checks     AVS printed at time of check out      Again, thank you for allowing me to participate in the care of your patient.        Sincerely,        Ame Chow PA-C

## 2023-12-19 NOTE — LETTER
PHYSICIAN ORDERS      DATE & TIME ISSUED: 2023 2:46 PM  PATIENT NAME: Kecia Blue   : 1960     [facility] MR# [if applicable]: [id number]     DIAGNOSIS:  Lung Transplant  Z94.2  Physical therapy referral to eval and treat. See attached recent clinic visit note.       Any questions please call: Mikayla [phone]       Ame Chow PA-C

## 2023-12-20 NOTE — TELEPHONE ENCOUNTER
Tacrolimus level 4.6 at 12.5 hours, on 12/19.  Goal 8-10.   Current dose 0.5 mg in AM, 0.5 mg in PM    Dose changed to 0.5 mg in AM, 1 mg in PM   Recheck level in 5-7 days    Discussed with patient   MyChart message sent

## 2023-12-27 NOTE — PROGRESS NOTES
[facility]: Cancer Care Short Note                                    Discussion with Patient:                                                      OUTBOUND CALL: RNCC called patient to discuss recent EBV levels. Overall, EBV levels are downtrending and look much better. Patrice Schroeder reviewed--- will follow along with one more EBV level to ensure that she is continuing to downtrend. Pt states she had labs done this am locally so will watch for that level.   Will notify SOT team but plan for onc team to follow up PRN if her EBV levels start to climb again.       Intervention/Education provided during outreach:                                                         Patient to call/ZinMobihart message with updates    Patient verbalizes understanding of POC and has no other questions or concerns at this time.     Ciara Willoughby, RN, MSN, OCN   RN Care Coordinator   [facility]   [address] [place] [address] [id number] [phone]

## 2023-12-27 NOTE — TELEPHONE ENCOUNTER
[facility] lab calling to report a critical level, creatinine 5.5. Patient is ESRD on HD M/W/F.

## 2024-01-01 ENCOUNTER — APPOINTMENT (OUTPATIENT)
Dept: GENERAL RADIOLOGY | Facility: CLINIC | Age: 64
DRG: 003 | End: 2024-01-01
Attending: INTERNAL MEDICINE
Payer: MEDICARE

## 2024-01-01 ENCOUNTER — ANCILLARY PROCEDURE (OUTPATIENT)
Dept: GENERAL RADIOLOGY | Facility: CLINIC | Age: 64
DRG: 208 | End: 2024-01-01
Attending: NURSE PRACTITIONER
Payer: MEDICARE

## 2024-01-01 ENCOUNTER — LAB (OUTPATIENT)
Dept: LAB | Facility: CLINIC | Age: 64
End: 2024-01-01
Payer: MEDICARE

## 2024-01-01 ENCOUNTER — LAB (OUTPATIENT)
Dept: LAB | Facility: CLINIC | Age: 64
End: 2024-01-01

## 2024-01-01 ENCOUNTER — APPOINTMENT (OUTPATIENT)
Dept: OCCUPATIONAL THERAPY | Facility: CLINIC | Age: 64
DRG: 003 | End: 2024-01-01
Attending: INTERNAL MEDICINE
Payer: MEDICARE

## 2024-01-01 ENCOUNTER — TELEPHONE (OUTPATIENT)
Dept: TRANSPLANT | Facility: CLINIC | Age: 64
End: 2024-01-01
Payer: MEDICARE

## 2024-01-01 ENCOUNTER — TELEPHONE (OUTPATIENT)
Dept: TRANSPLANT | Facility: CLINIC | Age: 64
End: 2024-01-01

## 2024-01-01 ENCOUNTER — APPOINTMENT (OUTPATIENT)
Dept: CT IMAGING | Facility: CLINIC | Age: 64
DRG: 003 | End: 2024-01-01
Payer: MEDICARE

## 2024-01-01 ENCOUNTER — MYC MEDICAL ADVICE (OUTPATIENT)
Dept: TRANSPLANT | Facility: CLINIC | Age: 64
End: 2024-01-01
Payer: MEDICARE

## 2024-01-01 ENCOUNTER — APPOINTMENT (OUTPATIENT)
Dept: PHYSICAL THERAPY | Facility: CLINIC | Age: 64
DRG: 003 | End: 2024-01-01
Attending: INTERNAL MEDICINE
Payer: MEDICARE

## 2024-01-01 ENCOUNTER — DOCUMENTATION ONLY (OUTPATIENT)
Dept: TRANSPLANT | Facility: CLINIC | Age: 64
End: 2024-01-01
Payer: MEDICARE

## 2024-01-01 ENCOUNTER — APPOINTMENT (OUTPATIENT)
Dept: MRI IMAGING | Facility: CLINIC | Age: 64
DRG: 003 | End: 2024-01-01
Payer: MEDICARE

## 2024-01-01 ENCOUNTER — ANESTHESIA EVENT (OUTPATIENT)
Dept: INTENSIVE CARE | Facility: CLINIC | Age: 64
DRG: 003 | End: 2024-01-01
Payer: MEDICARE

## 2024-01-01 ENCOUNTER — APPOINTMENT (OUTPATIENT)
Dept: GENERAL RADIOLOGY | Facility: CLINIC | Age: 64
DRG: 003 | End: 2024-01-01
Payer: MEDICARE

## 2024-01-01 ENCOUNTER — TELEPHONE (OUTPATIENT)
Dept: PULMONOLOGY | Facility: CLINIC | Age: 64
End: 2024-01-01
Payer: MEDICARE

## 2024-01-01 ENCOUNTER — APPOINTMENT (OUTPATIENT)
Dept: PHYSICAL THERAPY | Facility: CLINIC | Age: 64
DRG: 208 | End: 2024-01-01
Attending: HOSPITALIST
Payer: MEDICARE

## 2024-01-01 ENCOUNTER — HEALTH MAINTENANCE LETTER (OUTPATIENT)
Age: 64
End: 2024-01-01

## 2024-01-01 ENCOUNTER — RESULTS ONLY (OUTPATIENT)
Dept: INTERNAL MEDICINE | Facility: CLINIC | Age: 64
End: 2024-01-01
Payer: MEDICARE

## 2024-01-01 ENCOUNTER — MYC REFILL (OUTPATIENT)
Dept: TRANSPLANT | Facility: CLINIC | Age: 64
End: 2024-01-01
Payer: MEDICARE

## 2024-01-01 ENCOUNTER — HOSPITAL ENCOUNTER (INPATIENT)
Facility: CLINIC | Age: 64
LOS: 1 days | Discharge: NURSING, REHAB OR LONG-TERM CARE | DRG: 003 | End: 2024-07-19
Attending: INTERNAL MEDICINE | Admitting: INTERNAL MEDICINE
Payer: MEDICARE

## 2024-01-01 ENCOUNTER — VIRTUAL VISIT (OUTPATIENT)
Dept: PULMONOLOGY | Facility: CLINIC | Age: 64
End: 2024-01-01
Attending: PHYSICIAN ASSISTANT
Payer: MEDICARE

## 2024-01-01 ENCOUNTER — LAB (OUTPATIENT)
Dept: LAB | Facility: CLINIC | Age: 64
End: 2024-01-01
Attending: PHYSICIAN ASSISTANT
Payer: MEDICARE

## 2024-01-01 ENCOUNTER — APPOINTMENT (OUTPATIENT)
Dept: SPEECH THERAPY | Facility: CLINIC | Age: 64
DRG: 208 | End: 2024-01-01
Attending: HOSPITALIST
Payer: MEDICARE

## 2024-01-01 ENCOUNTER — PATIENT OUTREACH (OUTPATIENT)
Dept: ONCOLOGY | Facility: CLINIC | Age: 64
End: 2024-01-01
Payer: MEDICARE

## 2024-01-01 ENCOUNTER — APPOINTMENT (OUTPATIENT)
Dept: CT IMAGING | Facility: CLINIC | Age: 64
DRG: 003 | End: 2024-01-01
Attending: INTERNAL MEDICINE
Payer: MEDICARE

## 2024-01-01 ENCOUNTER — APPOINTMENT (OUTPATIENT)
Dept: OCCUPATIONAL THERAPY | Facility: CLINIC | Age: 64
DRG: 208 | End: 2024-01-01
Attending: HOSPITALIST
Payer: MEDICARE

## 2024-01-01 ENCOUNTER — MYC MEDICAL ADVICE (OUTPATIENT)
Dept: INTERVENTIONAL RADIOLOGY/VASCULAR | Facility: CLINIC | Age: 64
End: 2024-01-01
Payer: MEDICARE

## 2024-01-01 ENCOUNTER — ANESTHESIA (OUTPATIENT)
Dept: SURGERY | Facility: CLINIC | Age: 64
DRG: 003 | End: 2024-01-01
Payer: MEDICARE

## 2024-01-01 ENCOUNTER — APPOINTMENT (OUTPATIENT)
Dept: GENERAL RADIOLOGY | Facility: CLINIC | Age: 64
DRG: 003 | End: 2024-01-01
Attending: PHYSICIAN ASSISTANT
Payer: MEDICARE

## 2024-01-01 ENCOUNTER — APPOINTMENT (OUTPATIENT)
Dept: GENERAL RADIOLOGY | Facility: CLINIC | Age: 64
DRG: 003 | End: 2024-01-01
Attending: STUDENT IN AN ORGANIZED HEALTH CARE EDUCATION/TRAINING PROGRAM
Payer: MEDICARE

## 2024-01-01 ENCOUNTER — TRANSFERRED RECORDS (OUTPATIENT)
Dept: HEALTH INFORMATION MANAGEMENT | Facility: CLINIC | Age: 64
End: 2024-01-01
Payer: MEDICARE

## 2024-01-01 ENCOUNTER — ANESTHESIA EVENT (OUTPATIENT)
Dept: SURGERY | Facility: CLINIC | Age: 64
DRG: 003 | End: 2024-01-01
Payer: MEDICARE

## 2024-01-01 ENCOUNTER — APPOINTMENT (OUTPATIENT)
Dept: PHYSICAL THERAPY | Facility: CLINIC | Age: 64
DRG: 003 | End: 2024-01-01
Payer: MEDICARE

## 2024-01-01 ENCOUNTER — COMMITTEE REVIEW (OUTPATIENT)
Dept: TRANSPLANT | Facility: CLINIC | Age: 64
End: 2024-01-01
Payer: MEDICARE

## 2024-01-01 ENCOUNTER — APPOINTMENT (OUTPATIENT)
Dept: ULTRASOUND IMAGING | Facility: CLINIC | Age: 64
DRG: 003 | End: 2024-01-01
Attending: INTERNAL MEDICINE
Payer: MEDICARE

## 2024-01-01 ENCOUNTER — EXTERNAL ORDER RESULTS (OUTPATIENT)
Dept: LAB | Facility: CLINIC | Age: 64
End: 2024-01-01

## 2024-01-01 ENCOUNTER — APPOINTMENT (OUTPATIENT)
Dept: OCCUPATIONAL THERAPY | Facility: CLINIC | Age: 64
DRG: 003 | End: 2024-01-01
Payer: MEDICARE

## 2024-01-01 ENCOUNTER — PRE VISIT (OUTPATIENT)
Dept: OTHER | Age: 64
End: 2024-01-01

## 2024-01-01 ENCOUNTER — TELEPHONE (OUTPATIENT)
Dept: TRANSPLANT | Facility: CLINIC | Age: 64
End: 2024-01-01
Payer: COMMERCIAL

## 2024-01-01 ENCOUNTER — APPOINTMENT (OUTPATIENT)
Dept: MEDSURG UNIT | Facility: CLINIC | Age: 64
End: 2024-01-01
Attending: RADIOLOGY
Payer: MEDICARE

## 2024-01-01 ENCOUNTER — HOSPITAL ENCOUNTER (OUTPATIENT)
Dept: INTERVENTIONAL RADIOLOGY/VASCULAR | Facility: HOSPITAL | Age: 64
Discharge: HOME | End: 2024-07-25
Attending: NURSE PRACTITIONER | Admitting: RADIOLOGY
Payer: MEDICARE

## 2024-01-01 ENCOUNTER — HOSPITAL ENCOUNTER (OUTPATIENT)
Facility: CLINIC | Age: 64
End: 2024-01-01
Attending: INTERNAL MEDICINE | Admitting: INTERNAL MEDICINE
Payer: MEDICARE

## 2024-01-01 ENCOUNTER — OFFICE VISIT (OUTPATIENT)
Dept: PULMONOLOGY | Facility: CLINIC | Age: 64
End: 2024-01-01
Payer: MEDICARE

## 2024-01-01 ENCOUNTER — HOSPITAL ENCOUNTER (INPATIENT)
Facility: CLINIC | Age: 64
LOS: 30 days | Discharge: NURSING, REHAB OR LONG-TERM CARE | DRG: 003 | End: 2024-07-18
Attending: INTERNAL MEDICINE | Admitting: STUDENT IN AN ORGANIZED HEALTH CARE EDUCATION/TRAINING PROGRAM
Payer: MEDICARE

## 2024-01-01 ENCOUNTER — ANESTHESIA (OUTPATIENT)
Dept: INTENSIVE CARE | Facility: CLINIC | Age: 64
DRG: 003 | End: 2024-01-01
Payer: MEDICARE

## 2024-01-01 ENCOUNTER — HOSPITAL ENCOUNTER (OUTPATIENT)
Facility: CLINIC | Age: 64
Discharge: HOME | End: 2024-03-05
Attending: RADIOLOGY | Admitting: STUDENT IN AN ORGANIZED HEALTH CARE EDUCATION/TRAINING PROGRAM
Payer: MEDICARE

## 2024-01-01 ENCOUNTER — ANESTHESIA EVENT (OUTPATIENT)
Dept: SURGERY | Facility: CLINIC | Age: 64
End: 2024-01-01
Payer: MEDICARE

## 2024-01-01 ENCOUNTER — APPOINTMENT (OUTPATIENT)
Dept: INTERVENTIONAL RADIOLOGY/VASCULAR | Facility: CLINIC | Age: 64
End: 2024-01-01
Attending: INTERNAL MEDICINE
Payer: MEDICARE

## 2024-01-01 ENCOUNTER — TELEPHONE (OUTPATIENT)
Dept: ENDOCRINOLOGY | Facility: CLINIC | Age: 64
End: 2024-01-01
Payer: MEDICARE

## 2024-01-01 ENCOUNTER — APPOINTMENT (OUTPATIENT)
Dept: INTERVENTIONAL RADIOLOGY/VASCULAR | Facility: CLINIC | Age: 64
DRG: 003 | End: 2024-01-01
Attending: NURSE PRACTITIONER
Payer: MEDICARE

## 2024-01-01 ENCOUNTER — VIRTUAL VISIT (OUTPATIENT)
Dept: INFECTIOUS DISEASES | Facility: CLINIC | Age: 64
End: 2024-01-01
Attending: STUDENT IN AN ORGANIZED HEALTH CARE EDUCATION/TRAINING PROGRAM
Payer: MEDICARE

## 2024-01-01 ENCOUNTER — HOSPITAL ENCOUNTER (INPATIENT)
Facility: CLINIC | Age: 64
LOS: 1 days | Discharge: ANOTHER HOSPITAL | DRG: 208 | End: 2024-07-18
Attending: STUDENT IN AN ORGANIZED HEALTH CARE EDUCATION/TRAINING PROGRAM | Admitting: HOSPITALIST
Payer: MEDICARE

## 2024-01-01 ENCOUNTER — APPOINTMENT (OUTPATIENT)
Dept: CARDIOLOGY | Facility: CLINIC | Age: 64
DRG: 003 | End: 2024-01-01
Payer: MEDICARE

## 2024-01-01 ENCOUNTER — APPOINTMENT (OUTPATIENT)
Dept: ULTRASOUND IMAGING | Facility: CLINIC | Age: 64
DRG: 003 | End: 2024-01-01
Attending: CLINICAL NURSE SPECIALIST
Payer: MEDICARE

## 2024-01-01 ENCOUNTER — ANESTHESIA (OUTPATIENT)
Dept: SURGERY | Facility: CLINIC | Age: 64
End: 2024-01-01
Payer: MEDICARE

## 2024-01-01 ENCOUNTER — TELEPHONE (OUTPATIENT)
Dept: INFECTIOUS DISEASES | Facility: CLINIC | Age: 64
End: 2024-01-01
Payer: MEDICARE

## 2024-01-01 ENCOUNTER — ANCILLARY PROCEDURE (OUTPATIENT)
Dept: CT IMAGING | Facility: CLINIC | Age: 64
End: 2024-01-01
Attending: PHYSICIAN ASSISTANT
Payer: MEDICARE

## 2024-01-01 ENCOUNTER — HOSPITAL ENCOUNTER (OUTPATIENT)
Facility: CLINIC | Age: 64
Discharge: HOME | End: 2024-02-15
Attending: STUDENT IN AN ORGANIZED HEALTH CARE EDUCATION/TRAINING PROGRAM | Admitting: STUDENT IN AN ORGANIZED HEALTH CARE EDUCATION/TRAINING PROGRAM
Payer: MEDICARE

## 2024-01-01 ENCOUNTER — HOSPITAL ENCOUNTER (INPATIENT)
Facility: CLINIC | Age: 64
LOS: 16 days | DRG: 208 | End: 2024-08-04
Attending: HOSPITALIST | Admitting: HOSPITALIST
Payer: MEDICARE

## 2024-01-01 VITALS
WEIGHT: 127.87 LBS | HEIGHT: 63 IN | TEMPERATURE: 97.5 F | BODY MASS INDEX: 22.66 KG/M2 | HEART RATE: 78 BPM | SYSTOLIC BLOOD PRESSURE: 159 MMHG | RESPIRATION RATE: 22 BRPM | OXYGEN SATURATION: 94 % | DIASTOLIC BLOOD PRESSURE: 85 MMHG

## 2024-01-01 VITALS
OXYGEN SATURATION: 100 % | HEIGHT: 63 IN | BODY MASS INDEX: 19.8 KG/M2 | SYSTOLIC BLOOD PRESSURE: 107 MMHG | WEIGHT: 111.77 LBS | TEMPERATURE: 98 F | HEART RATE: 88 BPM | RESPIRATION RATE: 26 BRPM | DIASTOLIC BLOOD PRESSURE: 67 MMHG

## 2024-01-01 VITALS — SYSTOLIC BLOOD PRESSURE: 158 MMHG | DIASTOLIC BLOOD PRESSURE: 75 MMHG | OXYGEN SATURATION: 93 % | HEART RATE: 77 BPM

## 2024-01-01 VITALS
OXYGEN SATURATION: 99 % | RESPIRATION RATE: 22 BRPM | HEART RATE: 75 BPM | DIASTOLIC BLOOD PRESSURE: 71 MMHG | SYSTOLIC BLOOD PRESSURE: 137 MMHG

## 2024-01-01 VITALS
OXYGEN SATURATION: 96 % | RESPIRATION RATE: 18 BRPM | TEMPERATURE: 98 F | WEIGHT: 127.3 LBS | BODY MASS INDEX: 22.55 KG/M2 | SYSTOLIC BLOOD PRESSURE: 142 MMHG | DIASTOLIC BLOOD PRESSURE: 86 MMHG | HEART RATE: 80 BPM

## 2024-01-01 VITALS
SYSTOLIC BLOOD PRESSURE: 118 MMHG | RESPIRATION RATE: 12 BRPM | BODY MASS INDEX: 20.85 KG/M2 | WEIGHT: 117.73 LBS | TEMPERATURE: 98.4 F | OXYGEN SATURATION: 100 % | DIASTOLIC BLOOD PRESSURE: 59 MMHG | HEART RATE: 86 BPM

## 2024-01-01 VITALS
TEMPERATURE: 98.1 F | HEART RATE: 89 BPM | DIASTOLIC BLOOD PRESSURE: 65 MMHG | RESPIRATION RATE: 38 BRPM | OXYGEN SATURATION: 100 % | SYSTOLIC BLOOD PRESSURE: 122 MMHG

## 2024-01-01 VITALS
SYSTOLIC BLOOD PRESSURE: 162 MMHG | DIASTOLIC BLOOD PRESSURE: 81 MMHG | RESPIRATION RATE: 25 BRPM | OXYGEN SATURATION: 96 % | TEMPERATURE: 98.2 F | HEART RATE: 90 BPM

## 2024-01-01 VITALS — HEIGHT: 63 IN | BODY MASS INDEX: 21.26 KG/M2 | WEIGHT: 120 LBS

## 2024-01-01 VITALS — BODY MASS INDEX: 21.62 KG/M2 | WEIGHT: 122 LBS | HEIGHT: 63 IN

## 2024-01-01 DIAGNOSIS — Z94.2 S/P LUNG TRANSPLANT (H): ICD-10-CM

## 2024-01-01 DIAGNOSIS — R93.89 ABNORMAL CT OF THE CHEST: ICD-10-CM

## 2024-01-01 DIAGNOSIS — N18.6 END STAGE RENAL FAILURE ON DIALYSIS (H): Primary | ICD-10-CM

## 2024-01-01 DIAGNOSIS — Z94.2 LUNG REPLACED BY TRANSPLANT (H): ICD-10-CM

## 2024-01-01 DIAGNOSIS — Z94.2 LUNG REPLACED BY TRANSPLANT (H): Primary | ICD-10-CM

## 2024-01-01 DIAGNOSIS — N18.6 ESRD (END STAGE RENAL DISEASE) ON DIALYSIS (H): ICD-10-CM

## 2024-01-01 DIAGNOSIS — J18.9 POSTOBSTRUCTIVE PNEUMONIA: Primary | ICD-10-CM

## 2024-01-01 DIAGNOSIS — B25.9 CYTOMEGALOVIRUS (CMV) VIREMIA (H): ICD-10-CM

## 2024-01-01 DIAGNOSIS — J98.09 BRONCHIAL STENOSIS: Primary | ICD-10-CM

## 2024-01-01 DIAGNOSIS — R63.30 FEEDING DIFFICULTIES: Primary | ICD-10-CM

## 2024-01-01 DIAGNOSIS — R63.30 FEEDING DIFFICULTIES: ICD-10-CM

## 2024-01-01 DIAGNOSIS — T82.898S OTHER SPECIFIED COMPLICATION OF VASCULAR PROSTHETIC DEVICES, IMPLANTS AND GRAFTS, SEQUELA: ICD-10-CM

## 2024-01-01 DIAGNOSIS — J96.22 ACUTE ON CHRONIC RESPIRATORY FAILURE WITH HYPERCAPNIA (H): ICD-10-CM

## 2024-01-01 DIAGNOSIS — B44.89 INFECTION BY ASPERGILLUS FUMIGATUS (H): ICD-10-CM

## 2024-01-01 DIAGNOSIS — Z99.2 ESRD (END STAGE RENAL DISEASE) ON DIALYSIS (H): ICD-10-CM

## 2024-01-01 DIAGNOSIS — Z99.2 END STAGE RENAL FAILURE ON DIALYSIS (H): Primary | ICD-10-CM

## 2024-01-01 DIAGNOSIS — T86.810 LUNG TRANSPLANT REJECTION (H): Primary | ICD-10-CM

## 2024-01-01 DIAGNOSIS — R09.02 HYPOXIA: ICD-10-CM

## 2024-01-01 DIAGNOSIS — J98.09 BRONCHIAL STENOSIS, RIGHT: ICD-10-CM

## 2024-01-01 DIAGNOSIS — N18.6 END STAGE RENAL FAILURE ON DIALYSIS (H): ICD-10-CM

## 2024-01-01 DIAGNOSIS — D70.8 OTHER NEUTROPENIA (H): Primary | ICD-10-CM

## 2024-01-01 DIAGNOSIS — Z99.2 END STAGE RENAL FAILURE ON DIALYSIS (H): ICD-10-CM

## 2024-01-01 DIAGNOSIS — B27.00 EBV (EPSTEIN-BARR VIRUS) VIREMIA: ICD-10-CM

## 2024-01-01 DIAGNOSIS — R23.9 ALTERATION IN SKIN INTEGRITY: Primary | ICD-10-CM

## 2024-01-01 DIAGNOSIS — Z79.899 ENCOUNTER FOR LONG-TERM (CURRENT) USE OF HIGH-RISK MEDICATION: ICD-10-CM

## 2024-01-01 DIAGNOSIS — J86.9 EMPYEMA OF LUNG (H): ICD-10-CM

## 2024-01-01 DIAGNOSIS — K21.9 GASTROESOPHAGEAL REFLUX DISEASE WITHOUT ESOPHAGITIS: ICD-10-CM

## 2024-01-01 LAB
1,3 BETA GLUCAN SER-MCNC: <31 PG/ML
1,3 BETA GLUCAN SER-MCNC: <31 PG/ML
ABO/RH(D): NORMAL
ACANTHOCYTES BLD QL SMEAR: NORMAL
ACID FAST STAIN (ARUP): NORMAL
ALBUMIN SERPL BCG-MCNC: 2.2 G/DL (ref 3.5–5.2)
ALBUMIN SERPL BCG-MCNC: 2.3 G/DL (ref 3.5–5.2)
ALBUMIN SERPL BCG-MCNC: 2.4 G/DL (ref 3.5–5.2)
ALBUMIN SERPL BCG-MCNC: 2.5 G/DL (ref 3.5–5.2)
ALBUMIN SERPL BCG-MCNC: 2.6 G/DL (ref 3.5–5.2)
ALBUMIN SERPL BCG-MCNC: 2.7 G/DL (ref 3.5–5.2)
ALBUMIN SERPL BCG-MCNC: 2.8 G/DL (ref 3.5–5.2)
ALBUMIN SERPL BCG-MCNC: 2.8 G/DL (ref 3.5–5.2)
ALBUMIN SERPL BCG-MCNC: 3.5 G/DL (ref 3.5–5.2)
ALBUMIN SERPL BCG-MCNC: 3.6 G/DL (ref 3.5–5.2)
ALBUMIN SERPL BCG-MCNC: 3.6 G/DL (ref 3.5–5.2)
ALBUMIN UR-MCNC: 600 MG/DL
ALLEN'S TEST: ABNORMAL
ALLEN'S TEST: NO
ALP BONE SERPL-CCNC: 81 U/L
ALP LIVER SERPL-CCNC: 156 U/L
ALP OTHER SERPL-CCNC: 0 U/L
ALP SERPL-CCNC: 114 U/L (ref 40–150)
ALP SERPL-CCNC: 122 U/L (ref 40–150)
ALP SERPL-CCNC: 123 U/L (ref 40–150)
ALP SERPL-CCNC: 134 U/L (ref 40–150)
ALP SERPL-CCNC: 135 U/L (ref 40–150)
ALP SERPL-CCNC: 146 U/L (ref 40–150)
ALP SERPL-CCNC: 153 U/L (ref 40–150)
ALP SERPL-CCNC: 173 U/L (ref 40–150)
ALP SERPL-CCNC: 175 U/L (ref 40–150)
ALP SERPL-CCNC: 176 U/L (ref 40–150)
ALP SERPL-CCNC: 178 U/L (ref 40–150)
ALP SERPL-CCNC: 235 U/L (ref 40–150)
ALP SERPL-CCNC: 237 U/L
ALP SERPL-CCNC: 237 U/L (ref 40–150)
ALP SERPL-CCNC: 282 U/L (ref 40–150)
ALP SERPL-CCNC: 287 U/L (ref 40–150)
ALP SERPL-CCNC: 382 U/L (ref 40–150)
ALP SERPL-CCNC: 474 U/L (ref 40–150)
ALP SERPL-CCNC: 78 U/L (ref 40–150)
ALP SERPL-CCNC: 79 U/L (ref 40–150)
ALP SERPL-CCNC: 89 U/L (ref 40–150)
ALT SERPL W P-5'-P-CCNC: 13 U/L (ref 0–50)
ALT SERPL W P-5'-P-CCNC: 17 U/L (ref 0–50)
ALT SERPL W P-5'-P-CCNC: 18 U/L (ref 0–50)
ALT SERPL W P-5'-P-CCNC: 20 U/L (ref 0–50)
ALT SERPL W P-5'-P-CCNC: 20 U/L (ref 0–50)
ALT SERPL W P-5'-P-CCNC: 23 U/L (ref 0–50)
ALT SERPL W P-5'-P-CCNC: 28 U/L (ref 0–50)
ALT SERPL W P-5'-P-CCNC: 28 U/L (ref 0–50)
ALT SERPL W P-5'-P-CCNC: 32 U/L (ref 0–50)
ALT SERPL W P-5'-P-CCNC: 36 U/L (ref 0–50)
ALT SERPL W P-5'-P-CCNC: 36 U/L (ref 0–50)
ALT SERPL W P-5'-P-CCNC: 37 U/L (ref 0–50)
ALT SERPL W P-5'-P-CCNC: 37 U/L (ref 0–50)
ALT SERPL W P-5'-P-CCNC: 38 U/L (ref 0–50)
ALT SERPL W P-5'-P-CCNC: 39 U/L (ref 0–50)
ALT SERPL W P-5'-P-CCNC: 39 U/L (ref 0–50)
ALT SERPL W P-5'-P-CCNC: 61 U/L (ref 0–50)
ANION GAP SERPL CALCULATED.3IONS-SCNC: 10 MMOL/L (ref 7–15)
ANION GAP SERPL CALCULATED.3IONS-SCNC: 11 MMOL/L (ref 7–15)
ANION GAP SERPL CALCULATED.3IONS-SCNC: 12 MMOL/L (ref 7–15)
ANION GAP SERPL CALCULATED.3IONS-SCNC: 13 MMOL/L (ref 7–15)
ANION GAP SERPL CALCULATED.3IONS-SCNC: 13 MMOL/L (ref 7–15)
ANION GAP SERPL CALCULATED.3IONS-SCNC: 14 MMOL/L (ref 7–15)
ANION GAP SERPL CALCULATED.3IONS-SCNC: 15 MMOL/L (ref 7–15)
ANION GAP SERPL CALCULATED.3IONS-SCNC: 6 MMOL/L (ref 7–15)
ANION GAP SERPL CALCULATED.3IONS-SCNC: 6 MMOL/L (ref 7–15)
ANION GAP SERPL CALCULATED.3IONS-SCNC: 7 MMOL/L (ref 7–15)
ANION GAP SERPL CALCULATED.3IONS-SCNC: 8 MMOL/L (ref 7–15)
ANION GAP SERPL CALCULATED.3IONS-SCNC: 9 MMOL/L (ref 7–15)
ANTIBODY SCREEN: NEGATIVE
APPEARANCE FLD: ABNORMAL
APPEARANCE FLD: ABNORMAL
APPEARANCE FLD: CLEAR
APPEARANCE UR: ABNORMAL
AST SERPL W P-5'-P-CCNC: 11 U/L (ref 0–45)
AST SERPL W P-5'-P-CCNC: 12 U/L (ref 0–45)
AST SERPL W P-5'-P-CCNC: 12 U/L (ref 0–45)
AST SERPL W P-5'-P-CCNC: 16 U/L (ref 0–45)
AST SERPL W P-5'-P-CCNC: 16 U/L (ref 0–45)
AST SERPL W P-5'-P-CCNC: 17 U/L (ref 0–45)
AST SERPL W P-5'-P-CCNC: 20 U/L (ref 0–45)
AST SERPL W P-5'-P-CCNC: 22 U/L (ref 0–45)
AST SERPL W P-5'-P-CCNC: 22 U/L (ref 0–45)
AST SERPL W P-5'-P-CCNC: 25 U/L (ref 0–45)
AST SERPL W P-5'-P-CCNC: 25 U/L (ref 0–45)
AST SERPL W P-5'-P-CCNC: 26 U/L (ref 0–45)
AST SERPL W P-5'-P-CCNC: 29 U/L (ref 0–45)
AST SERPL W P-5'-P-CCNC: 30 U/L (ref 0–45)
AST SERPL W P-5'-P-CCNC: 31 U/L (ref 0–45)
AST SERPL W P-5'-P-CCNC: 39 U/L (ref 0–45)
AST SERPL W P-5'-P-CCNC: 41 U/L (ref 0–45)
AST SERPL W P-5'-P-CCNC: ABNORMAL U/L
ATRIAL RATE - MUSE: 100 BPM
ATRIAL RATE - MUSE: 103 BPM
ATRIAL RATE - MUSE: 103 BPM
ATRIAL RATE - MUSE: 105 BPM
ATRIAL RATE - MUSE: 106 BPM
ATRIAL RATE - MUSE: 114 BPM
ATRIAL RATE - MUSE: 123 BPM
ATRIAL RATE - MUSE: 126 BPM
ATRIAL RATE - MUSE: 69 BPM
ATRIAL RATE - MUSE: 70 BPM
ATRIAL RATE - MUSE: 73 BPM
ATRIAL RATE - MUSE: 76 BPM
ATRIAL RATE - MUSE: 76 BPM
ATRIAL RATE - MUSE: 77 BPM
ATRIAL RATE - MUSE: 77 BPM
ATRIAL RATE - MUSE: 78 BPM
ATRIAL RATE - MUSE: 79 BPM
ATRIAL RATE - MUSE: 80 BPM
ATRIAL RATE - MUSE: 81 BPM
ATRIAL RATE - MUSE: 81 BPM
ATRIAL RATE - MUSE: 82 BPM
ATRIAL RATE - MUSE: 83 BPM
ATRIAL RATE - MUSE: 84 BPM
ATRIAL RATE - MUSE: 85 BPM
ATRIAL RATE - MUSE: 86 BPM
ATRIAL RATE - MUSE: 88 BPM
ATRIAL RATE - MUSE: 89 BPM
ATRIAL RATE - MUSE: 90 BPM
ATRIAL RATE - MUSE: 95 BPM
ATRIAL RATE - MUSE: 97 BPM
ATRIAL RATE - MUSE: 98 BPM
ATRIAL RATE - MUSE: 99 BPM
ATRIAL RATE - MUSE: NORMAL BPM
AUER BODIES BLD QL SMEAR: NORMAL
BACTERIA #/AREA URNS HPF: ABNORMAL /HPF
BACTERIA BLD CULT: NO GROWTH
BACTERIA BRONCH: ABNORMAL
BACTERIA BRONCH: NO GROWTH
BACTERIA SPEC CULT: ABNORMAL
BACTERIA SPEC CULT: NO GROWTH
BACTERIA SPEC CULT: NO GROWTH
BACTERIA SPEC CULT: NORMAL
BACTERIA SPT CULT: ABNORMAL
BACTERIA UR CULT: ABNORMAL
BASE EXCESS BLDA CALC-SCNC: -0.8 MMOL/L (ref -3–3)
BASE EXCESS BLDA CALC-SCNC: -1 MMOL/L (ref -3–3)
BASE EXCESS BLDA CALC-SCNC: -1.3 MMOL/L (ref -3–3)
BASE EXCESS BLDA CALC-SCNC: -1.5 MMOL/L (ref -3–3)
BASE EXCESS BLDA CALC-SCNC: -1.6 MMOL/L (ref -3–3)
BASE EXCESS BLDA CALC-SCNC: -2 MMOL/L (ref -3–3)
BASE EXCESS BLDA CALC-SCNC: -2.1 MMOL/L (ref -3–3)
BASE EXCESS BLDA CALC-SCNC: -2.2 MMOL/L (ref -3–3)
BASE EXCESS BLDA CALC-SCNC: -2.3 MMOL/L (ref -3–3)
BASE EXCESS BLDA CALC-SCNC: -2.3 MMOL/L (ref -3–3)
BASE EXCESS BLDA CALC-SCNC: -2.4 MMOL/L (ref -3–3)
BASE EXCESS BLDA CALC-SCNC: -2.6 MMOL/L (ref -3–3)
BASE EXCESS BLDA CALC-SCNC: -2.9 MMOL/L (ref -3–3)
BASE EXCESS BLDA CALC-SCNC: -3 MMOL/L (ref -3–3)
BASE EXCESS BLDA CALC-SCNC: -3.2 MMOL/L (ref -3–3)
BASE EXCESS BLDA CALC-SCNC: -4.3 MMOL/L (ref -3–3)
BASE EXCESS BLDV CALC-SCNC: -0.5 MMOL/L (ref -3–3)
BASE EXCESS BLDV CALC-SCNC: -1.3 MMOL/L (ref -3–3)
BASE EXCESS BLDV CALC-SCNC: -1.6 MMOL/L (ref -3–3)
BASE EXCESS BLDV CALC-SCNC: -2.1 MMOL/L (ref -3–3)
BASE EXCESS BLDV CALC-SCNC: 0 MMOL/L (ref -3–3)
BASE EXCESS BLDV CALC-SCNC: 0.3 MMOL/L (ref -3–3)
BASE EXCESS BLDV CALC-SCNC: 1.1 MMOL/L (ref -3–3)
BASE EXCESS BLDV CALC-SCNC: 1.3 MMOL/L (ref -3–3)
BASE EXCESS BLDV CALC-SCNC: 1.9 MMOL/L (ref -3–3)
BASE EXCESS BLDV CALC-SCNC: 1.9 MMOL/L (ref -3–3)
BASE EXCESS BLDV CALC-SCNC: 2 MMOL/L (ref -3–3)
BASE EXCESS BLDV CALC-SCNC: 4.5 MMOL/L (ref -3–3)
BASE EXCESS BLDV CALC-SCNC: 4.6 MMOL/L (ref -3–3)
BASE EXCESS BLDV CALC-SCNC: 5.2 MMOL/L (ref -3–3)
BASE EXCESS BLDV CALC-SCNC: 5.4 MMOL/L (ref -3–3)
BASE EXCESS BLDV CALC-SCNC: 5.5 MMOL/L (ref -3–3)
BASE EXCESS BLDV CALC-SCNC: 5.7 MMOL/L (ref -3–3)
BASE EXCESS BLDV CALC-SCNC: 6.2 MMOL/L (ref -3–3)
BASO STIPL BLD QL SMEAR: NORMAL
BASOPHILS # BLD AUTO: 0 10E3/UL (ref 0–0.2)
BASOPHILS # BLD AUTO: ABNORMAL 10*3/UL
BASOPHILS # BLD MANUAL: 0 10E3/UL (ref 0–0.2)
BASOPHILS # BLD MANUAL: 0.1 10E3/UL (ref 0–0.2)
BASOPHILS # BLD MANUAL: 0.1 10E3/UL (ref 0–0.2)
BASOPHILS NFR BLD AUTO: 0 %
BASOPHILS NFR BLD AUTO: 1 %
BASOPHILS NFR BLD AUTO: ABNORMAL %
BASOPHILS NFR BLD MANUAL: 0 %
BASOPHILS NFR BLD MANUAL: 1 %
BASOPHILS NFR BLD MANUAL: 3 %
BILIRUB DIRECT SERPL-MCNC: 0.38 MG/DL (ref 0–0.3)
BILIRUB DIRECT SERPL-MCNC: 0.38 MG/DL (ref 0–0.3)
BILIRUB DIRECT SERPL-MCNC: 0.56 MG/DL (ref 0–0.3)
BILIRUB DIRECT SERPL-MCNC: 0.56 MG/DL (ref 0–0.3)
BILIRUB DIRECT SERPL-MCNC: 0.6 MG/DL (ref 0–0.3)
BILIRUB DIRECT SERPL-MCNC: 0.63 MG/DL (ref 0–0.3)
BILIRUB DIRECT SERPL-MCNC: 0.64 MG/DL (ref 0–0.3)
BILIRUB DIRECT SERPL-MCNC: 0.7 MG/DL (ref 0–0.3)
BILIRUB DIRECT SERPL-MCNC: 0.81 MG/DL (ref 0–0.3)
BILIRUB DIRECT SERPL-MCNC: 0.93 MG/DL (ref 0–0.3)
BILIRUB DIRECT SERPL-MCNC: 0.99 MG/DL (ref 0–0.3)
BILIRUB DIRECT SERPL-MCNC: ABNORMAL MG/DL
BILIRUB DIRECT SERPL-MCNC: NORMAL MG/DL
BILIRUB SERPL-MCNC: 0.4 MG/DL
BILIRUB SERPL-MCNC: 0.4 MG/DL
BILIRUB SERPL-MCNC: 0.5 MG/DL
BILIRUB SERPL-MCNC: 0.6 MG/DL
BILIRUB SERPL-MCNC: 0.7 MG/DL
BILIRUB SERPL-MCNC: 0.8 MG/DL
BILIRUB SERPL-MCNC: 0.9 MG/DL
BILIRUB SERPL-MCNC: 1 MG/DL
BILIRUB SERPL-MCNC: 1.1 MG/DL
BILIRUB SERPL-MCNC: 1.1 MG/DL
BILIRUB UR QL STRIP: NEGATIVE
BITE CELLS BLD QL SMEAR: NORMAL
BLD PROD TYP BPU: NORMAL
BLD PROD TYP BPU: NORMAL
BLISTER CELLS BLD QL SMEAR: NORMAL
BLOOD COMPONENT TYPE: NORMAL
BLOOD COMPONENT TYPE: NORMAL
BUN SERPL-MCNC: 16.4 MG/DL (ref 8–23)
BUN SERPL-MCNC: 17.8 MG/DL (ref 8–23)
BUN SERPL-MCNC: 21.8 MG/DL (ref 8–23)
BUN SERPL-MCNC: 25.3 MG/DL (ref 8–23)
BUN SERPL-MCNC: 26.4 MG/DL (ref 8–23)
BUN SERPL-MCNC: 26.6 MG/DL (ref 8–23)
BUN SERPL-MCNC: 27 MG/DL (ref 8–23)
BUN SERPL-MCNC: 28 MG/DL (ref 8–23)
BUN SERPL-MCNC: 28.1 MG/DL (ref 8–23)
BUN SERPL-MCNC: 28.1 MG/DL (ref 8–23)
BUN SERPL-MCNC: 28.2 MG/DL (ref 8–23)
BUN SERPL-MCNC: 28.2 MG/DL (ref 8–23)
BUN SERPL-MCNC: 29 MG/DL (ref 8–23)
BUN SERPL-MCNC: 29.2 MG/DL (ref 8–23)
BUN SERPL-MCNC: 29.3 MG/DL (ref 8–23)
BUN SERPL-MCNC: 29.7 MG/DL (ref 8–23)
BUN SERPL-MCNC: 30.2 MG/DL (ref 8–23)
BUN SERPL-MCNC: 30.5 MG/DL (ref 8–23)
BUN SERPL-MCNC: 30.6 MG/DL (ref 8–23)
BUN SERPL-MCNC: 31.7 MG/DL (ref 8–23)
BUN SERPL-MCNC: 32.3 MG/DL (ref 8–23)
BUN SERPL-MCNC: 32.3 MG/DL (ref 8–23)
BUN SERPL-MCNC: 32.6 MG/DL (ref 8–23)
BUN SERPL-MCNC: 33 MG/DL (ref 8–23)
BUN SERPL-MCNC: 33 MG/DL (ref 8–23)
BUN SERPL-MCNC: 33.9 MG/DL (ref 8–23)
BUN SERPL-MCNC: 34.4 MG/DL (ref 8–23)
BUN SERPL-MCNC: 34.4 MG/DL (ref 8–23)
BUN SERPL-MCNC: 34.5 MG/DL (ref 8–23)
BUN SERPL-MCNC: 34.9 MG/DL (ref 8–23)
BUN SERPL-MCNC: 35 MG/DL (ref 8–23)
BUN SERPL-MCNC: 36.6 MG/DL (ref 8–23)
BUN SERPL-MCNC: 38 MG/DL (ref 8–23)
BUN SERPL-MCNC: 38.1 MG/DL (ref 8–23)
BUN SERPL-MCNC: 38.5 MG/DL (ref 8–23)
BUN SERPL-MCNC: 38.7 MG/DL (ref 8–23)
BUN SERPL-MCNC: 39.1 MG/DL (ref 8–23)
BUN SERPL-MCNC: 39.8 MG/DL (ref 8–23)
BUN SERPL-MCNC: 40.7 MG/DL (ref 8–23)
BUN SERPL-MCNC: 40.7 MG/DL (ref 8–23)
BUN SERPL-MCNC: 41.7 MG/DL (ref 8–23)
BUN SERPL-MCNC: 47.6 MG/DL (ref 8–23)
BUN SERPL-MCNC: 54.1 MG/DL (ref 8–23)
BUN SERPL-MCNC: 54.2 MG/DL (ref 8–23)
BUN SERPL-MCNC: 55.5 MG/DL (ref 8–23)
BUN SERPL-MCNC: 57.5 MG/DL (ref 8–23)
BUN SERPL-MCNC: 59.7 MG/DL (ref 8–23)
BUN SERPL-MCNC: 61.6 MG/DL (ref 8–23)
BUN SERPL-MCNC: 63 MG/DL (ref 8–23)
BUN SERPL-MCNC: 63.3 MG/DL (ref 8–23)
BUN SERPL-MCNC: 65.2 MG/DL (ref 8–23)
BUN SERPL-MCNC: 67.4 MG/DL (ref 8–23)
BUN SERPL-MCNC: 80.2 MG/DL (ref 8–23)
BUN SERPL-MCNC: 86.1 MG/DL (ref 8–23)
BUN SERPL-MCNC: 86.4 MG/DL (ref 8–23)
BURR CELLS BLD QL SMEAR: NORMAL
BURR CELLS BLD QL SMEAR: SLIGHT
C DIFF TOX B STL QL: NEGATIVE
C PNEUM DNA SPEC QL NAA+PROBE: NOT DETECTED
CA-I BLD-MCNC: 4.2 MG/DL (ref 4.4–5.2)
CA-I BLD-MCNC: 4.2 MG/DL (ref 4.4–5.2)
CA-I BLD-MCNC: 4.3 MG/DL (ref 4.4–5.2)
CA-I BLD-MCNC: 4.4 MG/DL (ref 4.4–5.2)
CA-I BLD-MCNC: 4.5 MG/DL (ref 4.4–5.2)
CA-I BLD-MCNC: 4.6 MG/DL (ref 4.4–5.2)
CA-I BLD-MCNC: 4.7 MG/DL (ref 4.4–5.2)
CA-I BLD-MCNC: 4.8 MG/DL (ref 4.4–5.2)
CA-I BLD-MCNC: 4.9 MG/DL (ref 4.4–5.2)
CA-I BLD-MCNC: 5 MG/DL (ref 4.4–5.2)
CA-I BLD-MCNC: 5.1 MG/DL (ref 4.4–5.2)
CA-I BLD-MCNC: 5.2 MG/DL (ref 4.4–5.2)
CALCIUM SERPL-MCNC: 6.9 MG/DL (ref 8.8–10.2)
CALCIUM SERPL-MCNC: 7.2 MG/DL (ref 8.8–10.2)
CALCIUM SERPL-MCNC: 7.4 MG/DL (ref 8.8–10.2)
CALCIUM SERPL-MCNC: 7.4 MG/DL (ref 8.8–10.4)
CALCIUM SERPL-MCNC: 7.5 MG/DL (ref 8.8–10.2)
CALCIUM SERPL-MCNC: 7.5 MG/DL (ref 8.8–10.2)
CALCIUM SERPL-MCNC: 7.6 MG/DL (ref 8.8–10.2)
CALCIUM SERPL-MCNC: 7.7 MG/DL (ref 8.8–10.2)
CALCIUM SERPL-MCNC: 7.8 MG/DL (ref 8.8–10.2)
CALCIUM SERPL-MCNC: 7.9 MG/DL (ref 8.8–10.2)
CALCIUM SERPL-MCNC: 8 MG/DL (ref 8.8–10.2)
CALCIUM SERPL-MCNC: 8.1 MG/DL (ref 8.8–10.2)
CALCIUM SERPL-MCNC: 8.1 MG/DL (ref 8.8–10.4)
CALCIUM SERPL-MCNC: 8.2 MG/DL (ref 8.8–10.4)
CALCIUM SERPL-MCNC: 8.3 MG/DL (ref 8.8–10.2)
CALCIUM SERPL-MCNC: 8.4 MG/DL (ref 8.8–10.2)
CALCIUM SERPL-MCNC: 8.5 MG/DL (ref 8.8–10.2)
CALCIUM SERPL-MCNC: 8.5 MG/DL (ref 8.8–10.2)
CALCIUM SERPL-MCNC: 8.5 MG/DL (ref 8.8–10.4)
CALCIUM SERPL-MCNC: 8.6 MG/DL (ref 8.8–10.2)
CALCIUM SERPL-MCNC: 8.6 MG/DL (ref 8.8–10.4)
CALCIUM SERPL-MCNC: 8.6 MG/DL (ref 8.8–10.4)
CALCIUM SERPL-MCNC: 8.7 MG/DL (ref 8.8–10.2)
CALCIUM SERPL-MCNC: 8.8 MG/DL (ref 8.8–10.2)
CELL COUNT BODY FLUID SOURCE: ABNORMAL
CELL COUNT BODY FLUID SOURCE: ABNORMAL
CELL COUNT BODY FLUID SOURCE: NORMAL
CHLORIDE SERPL-SCNC: 100 MMOL/L (ref 98–107)
CHLORIDE SERPL-SCNC: 101 MMOL/L (ref 98–107)
CHLORIDE SERPL-SCNC: 103 MMOL/L (ref 98–107)
CHLORIDE SERPL-SCNC: 104 MMOL/L (ref 98–107)
CHLORIDE SERPL-SCNC: 105 MMOL/L (ref 98–107)
CHLORIDE SERPL-SCNC: 106 MMOL/L (ref 98–107)
CHLORIDE SERPL-SCNC: 107 MMOL/L (ref 98–107)
CHLORIDE SERPL-SCNC: 108 MMOL/L (ref 98–107)
CHLORIDE SERPL-SCNC: 108 MMOL/L (ref 98–107)
CHLORIDE SERPL-SCNC: 109 MMOL/L (ref 98–107)
CHLORIDE SERPL-SCNC: 109 MMOL/L (ref 98–107)
CHLORIDE SERPL-SCNC: 110 MMOL/L (ref 98–107)
CHLORIDE SERPL-SCNC: 92 MMOL/L (ref 98–107)
CHLORIDE SERPL-SCNC: 92 MMOL/L (ref 98–107)
CHLORIDE SERPL-SCNC: 93 MMOL/L (ref 98–107)
CHLORIDE SERPL-SCNC: 93 MMOL/L (ref 98–107)
CHLORIDE SERPL-SCNC: 95 MMOL/L (ref 98–107)
CHLORIDE SERPL-SCNC: 96 MMOL/L (ref 98–107)
CHLORIDE SERPL-SCNC: 97 MMOL/L (ref 98–107)
CHLORIDE SERPL-SCNC: 98 MMOL/L (ref 98–107)
CHLORIDE SERPL-SCNC: 99 MMOL/L (ref 98–107)
CHLORIDE SERPL-SCNC: 99 MMOL/L (ref 98–107)
CK SERPL-CCNC: 107 U/L (ref 26–192)
CK SERPL-CCNC: 18 U/L (ref 26–192)
CK SERPL-CCNC: 23 U/L (ref 26–192)
CK SERPL-CCNC: 24 U/L (ref 26–192)
CK SERPL-CCNC: 24 U/L (ref 26–192)
CK SERPL-CCNC: 26 U/L (ref 26–192)
CK SERPL-CCNC: 32 U/L (ref 26–192)
CK SERPL-CCNC: 37 U/L (ref 26–192)
CK SERPL-CCNC: 38 U/L (ref 26–192)
CK SERPL-CCNC: 49 U/L (ref 26–192)
CMV DNA SPEC NAA+PROBE-ACNC: 350 IU/ML
CMV DNA SPEC NAA+PROBE-ACNC: 39 IU/ML
CMV DNA SPEC NAA+PROBE-ACNC: 39 IU/ML
CMV DNA SPEC NAA+PROBE-ACNC: <35 IU/ML
CMV DNA SPEC NAA+PROBE-ACNC: NOT DETECTED IU/ML
CMV DNA SPEC NAA+PROBE-ACNC: NOT DETECTED IU/ML
CMV DNA SPEC NAA+PROBE-LOG#: 1.6 {LOG_COPIES}/ML
CMV DNA SPEC NAA+PROBE-LOG#: 1.6 {LOG_COPIES}/ML
CMV DNA SPEC NAA+PROBE-LOG#: 2.5 {LOG_COPIES}/ML
CMV DNA SPEC NAA+PROBE-LOG#: <1.5 {LOG_COPIES}/ML
COCCIDIOIDES AB TITR SER CF: NORMAL {TITER}
CODING SYSTEM: NORMAL
CODING SYSTEM: NORMAL
COHGB MFR BLD: 100 % (ref 96–97)
COHGB MFR BLD: 88.3 % (ref 96–97)
COHGB MFR BLD: 92.7 % (ref 96–97)
COHGB MFR BLD: 93.5 % (ref 96–97)
COHGB MFR BLD: 93.6 % (ref 96–97)
COHGB MFR BLD: 95.6 % (ref 96–97)
COHGB MFR BLD: 95.9 % (ref 96–97)
COHGB MFR BLD: 96.4 % (ref 96–97)
COHGB MFR BLD: 96.5 % (ref 96–97)
COHGB MFR BLD: 97.1 % (ref 96–97)
COHGB MFR BLD: 97.1 % (ref 96–97)
COHGB MFR BLD: 97.3 % (ref 96–97)
COHGB MFR BLD: 98.8 % (ref 96–97)
COHGB MFR BLD: 99.9 % (ref 96–97)
COHGB MFR BLD: >100 % (ref 96–97)
COHGB MFR BLD: >100 % (ref 96–97)
COLOR FLD: ABNORMAL
COLOR FLD: ABNORMAL
COLOR FLD: COLORLESS
COLOR UR AUTO: ABNORMAL
CREAT SERPL-MCNC: 0.82 MG/DL (ref 0.51–0.95)
CREAT SERPL-MCNC: 0.88 MG/DL (ref 0.51–0.95)
CREAT SERPL-MCNC: 0.88 MG/DL (ref 0.51–0.95)
CREAT SERPL-MCNC: 0.89 MG/DL (ref 0.51–0.95)
CREAT SERPL-MCNC: 0.89 MG/DL (ref 0.51–0.95)
CREAT SERPL-MCNC: 0.9 MG/DL (ref 0.51–0.95)
CREAT SERPL-MCNC: 0.9 MG/DL (ref 0.51–0.95)
CREAT SERPL-MCNC: 0.91 MG/DL (ref 0.51–0.95)
CREAT SERPL-MCNC: 0.94 MG/DL (ref 0.51–0.95)
CREAT SERPL-MCNC: 0.95 MG/DL (ref 0.51–0.95)
CREAT SERPL-MCNC: 0.96 MG/DL (ref 0.51–0.95)
CREAT SERPL-MCNC: 0.96 MG/DL (ref 0.51–0.95)
CREAT SERPL-MCNC: 0.97 MG/DL (ref 0.51–0.95)
CREAT SERPL-MCNC: 1.02 MG/DL (ref 0.51–0.95)
CREAT SERPL-MCNC: 1.04 MG/DL (ref 0.51–0.95)
CREAT SERPL-MCNC: 1.06 MG/DL (ref 0.51–0.95)
CREAT SERPL-MCNC: 1.15 MG/DL (ref 0.51–0.95)
CREAT SERPL-MCNC: 1.18 MG/DL (ref 0.51–0.95)
CREAT SERPL-MCNC: 1.28 MG/DL (ref 0.51–0.95)
CREAT SERPL-MCNC: 1.39 MG/DL (ref 0.51–0.95)
CREAT SERPL-MCNC: 1.46 MG/DL (ref 0.51–0.95)
CREAT SERPL-MCNC: 1.5 MG/DL (ref 0.51–0.95)
CREAT SERPL-MCNC: 1.57 MG/DL (ref 0.51–0.95)
CREAT SERPL-MCNC: 1.61 MG/DL (ref 0.51–0.95)
CREAT SERPL-MCNC: 1.65 MG/DL (ref 0.51–0.95)
CREAT SERPL-MCNC: 1.67 MG/DL (ref 0.51–0.95)
CREAT SERPL-MCNC: 1.67 MG/DL (ref 0.51–0.95)
CREAT SERPL-MCNC: 1.8 MG/DL (ref 0.51–0.95)
CREAT SERPL-MCNC: 1.8 MG/DL (ref 0.51–0.95)
CREAT SERPL-MCNC: 1.84 MG/DL (ref 0.51–0.95)
CREAT SERPL-MCNC: 1.84 MG/DL (ref 0.51–0.95)
CREAT SERPL-MCNC: 1.85 MG/DL (ref 0.51–0.95)
CREAT SERPL-MCNC: 1.91 MG/DL (ref 0.51–0.95)
CREAT SERPL-MCNC: 2.11 MG/DL (ref 0.51–0.95)
CREAT SERPL-MCNC: 2.23 MG/DL (ref 0.51–0.95)
CREAT SERPL-MCNC: 2.3 MG/DL (ref 0.51–0.95)
CREAT SERPL-MCNC: 2.3 MG/DL (ref 0.51–0.95)
CREAT SERPL-MCNC: 2.34 MG/DL (ref 0.51–0.95)
CREAT SERPL-MCNC: 2.42 MG/DL (ref 0.51–0.95)
CREAT SERPL-MCNC: 2.49 MG/DL (ref 0.51–0.95)
CREAT SERPL-MCNC: 2.52 MG/DL (ref 0.51–0.95)
CREAT SERPL-MCNC: 2.54 MG/DL (ref 0.51–0.95)
CREAT SERPL-MCNC: 2.61 MG/DL (ref 0.51–0.95)
CREAT SERPL-MCNC: 2.62 MG/DL (ref 0.51–0.95)
CREAT SERPL-MCNC: 2.72 MG/DL (ref 0.51–0.95)
CREAT SERPL-MCNC: 2.72 MG/DL (ref 0.51–0.95)
CREAT SERPL-MCNC: 2.84 MG/DL (ref 0.51–0.95)
CREAT SERPL-MCNC: 3.13 MG/DL (ref 0.51–0.95)
CREAT SERPL-MCNC: 3.15 MG/DL (ref 0.51–0.95)
CREAT SERPL-MCNC: 3.33 MG/DL (ref 0.51–0.95)
CREAT SERPL-MCNC: 3.74 MG/DL (ref 0.51–0.95)
CREAT SERPL-MCNC: 4.04 MG/DL (ref 0.51–0.95)
CREAT SERPL-MCNC: 4.09 MG/DL (ref 0.51–0.95)
CREAT SERPL-MCNC: 4.27 MG/DL (ref 0.51–0.95)
CREAT SERPL-MCNC: 4.31 MG/DL (ref 0.51–0.95)
CROSSMATCH: NORMAL
CROSSMATCH: NORMAL
CRP SERPL-MCNC: 182 MG/L
CRP SERPL-MCNC: 19.4 MG/L
CRP SERPL-MCNC: 264 MG/L
CRP SERPL-MCNC: 6.61 MG/L
CRP SERPL-MCNC: 7.55 MG/L
CRP SERPL-MCNC: 7.62 MG/L
CRP SERPL-MCNC: 8.74 MG/L
CRYPTOC AG SPEC QL: NEGATIVE
DACRYOCYTES BLD QL SMEAR: ABNORMAL
DACRYOCYTES BLD QL SMEAR: NORMAL
DACRYOCYTES BLD QL SMEAR: SLIGHT
DEPRECATED HCO3 PLAS-SCNC: 18 MMOL/L (ref 22–29)
DEPRECATED HCO3 PLAS-SCNC: 20 MMOL/L (ref 22–29)
DEPRECATED HCO3 PLAS-SCNC: 21 MMOL/L (ref 22–29)
DEPRECATED HCO3 PLAS-SCNC: 22 MMOL/L (ref 22–29)
DEPRECATED HCO3 PLAS-SCNC: 23 MMOL/L (ref 22–29)
DEPRECATED HCO3 PLAS-SCNC: 24 MMOL/L (ref 22–29)
DEPRECATED HCO3 PLAS-SCNC: 25 MMOL/L (ref 22–29)
DEPRECATED HCO3 PLAS-SCNC: 26 MMOL/L (ref 22–29)
DEPRECATED HCO3 PLAS-SCNC: 26 MMOL/L (ref 22–29)
DEPRECATED HCO3 PLAS-SCNC: 27 MMOL/L (ref 22–29)
DEPRECATED HCO3 PLAS-SCNC: 29 MMOL/L (ref 22–29)
DIASTOLIC BLOOD PRESSURE - MUSE: NORMAL MMHG
DONOR IDENTIFICATION: NORMAL
DONOR IDENTIFICATION: NORMAL
DSA COMMENTS: NORMAL
DSA COMMENTS: NORMAL
DSA PRESENT: NO
DSA PRESENT: NO
DSA TEST METHOD: NORMAL
DSA TEST METHOD: NORMAL
EBV DNA COPIES/ML, INSTRUMENT: 1610 COPIES/ML
EBV DNA SERPL NAA+PROBE-ACNC: 1680 IU/ML
EBV DNA SERPL NAA+PROBE-ACNC: <35 IU/ML
EBV DNA SERPL NAA+PROBE-ACNC: NOT DETECTED IU/ML
EBV DNA SERPL NAA+PROBE-LOG#: 3.2 {LOG_COPIES}/ML
EBV DNA SERPL NAA+PROBE-LOG#: <1.5 {LOG_COPIES}/ML
EBV DNA SPEC NAA+PROBE-LOG#: 3.2 {LOG_COPIES}/ML
EGFRCR SERPLBLD CKD-EPI 2021: 11 ML/MIN/1.73M2
EGFRCR SERPLBLD CKD-EPI 2021: 11 ML/MIN/1.73M2
EGFRCR SERPLBLD CKD-EPI 2021: 12 ML/MIN/1.73M2
EGFRCR SERPLBLD CKD-EPI 2021: 12 ML/MIN/1.73M2
EGFRCR SERPLBLD CKD-EPI 2021: 13 ML/MIN/1.73M2
EGFRCR SERPLBLD CKD-EPI 2021: 15 ML/MIN/1.73M2
EGFRCR SERPLBLD CKD-EPI 2021: 16 ML/MIN/1.73M2
EGFRCR SERPLBLD CKD-EPI 2021: 16 ML/MIN/1.73M2
EGFRCR SERPLBLD CKD-EPI 2021: 18 ML/MIN/1.73M2
EGFRCR SERPLBLD CKD-EPI 2021: 19 ML/MIN/1.73M2
EGFRCR SERPLBLD CKD-EPI 2021: 19 ML/MIN/1.73M2
EGFRCR SERPLBLD CKD-EPI 2021: 20 ML/MIN/1.73M2
EGFRCR SERPLBLD CKD-EPI 2021: 20 ML/MIN/1.73M2
EGFRCR SERPLBLD CKD-EPI 2021: 21 ML/MIN/1.73M2
EGFRCR SERPLBLD CKD-EPI 2021: 22 ML/MIN/1.73M2
EGFRCR SERPLBLD CKD-EPI 2021: 23 ML/MIN/1.73M2
EGFRCR SERPLBLD CKD-EPI 2021: 24 ML/MIN/1.73M2
EGFRCR SERPLBLD CKD-EPI 2021: 26 ML/MIN/1.73M2
EGFRCR SERPLBLD CKD-EPI 2021: 29 ML/MIN/1.73M2
EGFRCR SERPLBLD CKD-EPI 2021: 30 ML/MIN/1.73M2
EGFRCR SERPLBLD CKD-EPI 2021: 31 ML/MIN/1.73M2
EGFRCR SERPLBLD CKD-EPI 2021: 31 ML/MIN/1.73M2
EGFRCR SERPLBLD CKD-EPI 2021: 32 ML/MIN/1.73M2
EGFRCR SERPLBLD CKD-EPI 2021: 32 ML/MIN/1.73M2
EGFRCR SERPLBLD CKD-EPI 2021: 34 ML/MIN/1.73M2
EGFRCR SERPLBLD CKD-EPI 2021: 34 ML/MIN/1.73M2
EGFRCR SERPLBLD CKD-EPI 2021: 35 ML/MIN/1.73M2
EGFRCR SERPLBLD CKD-EPI 2021: 36 ML/MIN/1.73M2
EGFRCR SERPLBLD CKD-EPI 2021: 37 ML/MIN/1.73M2
EGFRCR SERPLBLD CKD-EPI 2021: 39 ML/MIN/1.73M2
EGFRCR SERPLBLD CKD-EPI 2021: 41 ML/MIN/1.73M2
EGFRCR SERPLBLD CKD-EPI 2021: 43 ML/MIN/1.73M2
EGFRCR SERPLBLD CKD-EPI 2021: 47 ML/MIN/1.73M2
EGFRCR SERPLBLD CKD-EPI 2021: 52 ML/MIN/1.73M2
EGFRCR SERPLBLD CKD-EPI 2021: 54 ML/MIN/1.73M2
EGFRCR SERPLBLD CKD-EPI 2021: 59 ML/MIN/1.73M2
EGFRCR SERPLBLD CKD-EPI 2021: 61 ML/MIN/1.73M2
EGFRCR SERPLBLD CKD-EPI 2021: 62 ML/MIN/1.73M2
EGFRCR SERPLBLD CKD-EPI 2021: 66 ML/MIN/1.73M2
EGFRCR SERPLBLD CKD-EPI 2021: 67 ML/MIN/1.73M2
EGFRCR SERPLBLD CKD-EPI 2021: 67 ML/MIN/1.73M2
EGFRCR SERPLBLD CKD-EPI 2021: 68 ML/MIN/1.73M2
EGFRCR SERPLBLD CKD-EPI 2021: 69 ML/MIN/1.73M2
EGFRCR SERPLBLD CKD-EPI 2021: 71 ML/MIN/1.73M2
EGFRCR SERPLBLD CKD-EPI 2021: 72 ML/MIN/1.73M2
EGFRCR SERPLBLD CKD-EPI 2021: 72 ML/MIN/1.73M2
EGFRCR SERPLBLD CKD-EPI 2021: 73 ML/MIN/1.73M2
EGFRCR SERPLBLD CKD-EPI 2021: 73 ML/MIN/1.73M2
EGFRCR SERPLBLD CKD-EPI 2021: 74 ML/MIN/1.73M2
EGFRCR SERPLBLD CKD-EPI 2021: 74 ML/MIN/1.73M2
EGFRCR SERPLBLD CKD-EPI 2021: 81 ML/MIN/1.73M2
ELLIPTOCYTES BLD QL SMEAR: NORMAL
ELLIPTOCYTES BLD QL SMEAR: SLIGHT
EOSINOPHIL # BLD AUTO: 0 10E3/UL (ref 0–0.7)
EOSINOPHIL # BLD AUTO: 0.1 10E3/UL (ref 0–0.7)
EOSINOPHIL # BLD AUTO: ABNORMAL 10*3/UL
EOSINOPHIL # BLD MANUAL: 0 10E3/UL (ref 0–0.7)
EOSINOPHIL # BLD MANUAL: 0.1 10E3/UL (ref 0–0.7)
EOSINOPHIL # BLD MANUAL: 0.2 10E3/UL (ref 0–0.7)
EOSINOPHIL NFR BLD AUTO: 0 %
EOSINOPHIL NFR BLD AUTO: 1 %
EOSINOPHIL NFR BLD AUTO: ABNORMAL %
EOSINOPHIL NFR BLD MANUAL: 0 %
EOSINOPHIL NFR BLD MANUAL: 1 %
EOSINOPHIL NFR BLD MANUAL: 2 %
EOSINOPHIL NFR BLD MANUAL: 3 %
ERYTHROCYTE [DISTWIDTH] IN BLOOD BY AUTOMATED COUNT: 14.6 % (ref 10–15)
ERYTHROCYTE [DISTWIDTH] IN BLOOD BY AUTOMATED COUNT: 14.7 % (ref 10–15)
ERYTHROCYTE [DISTWIDTH] IN BLOOD BY AUTOMATED COUNT: 14.8 % (ref 10–15)
ERYTHROCYTE [DISTWIDTH] IN BLOOD BY AUTOMATED COUNT: 14.9 % (ref 10–15)
ERYTHROCYTE [DISTWIDTH] IN BLOOD BY AUTOMATED COUNT: 14.9 % (ref 10–15)
ERYTHROCYTE [DISTWIDTH] IN BLOOD BY AUTOMATED COUNT: 15 % (ref 10–15)
ERYTHROCYTE [DISTWIDTH] IN BLOOD BY AUTOMATED COUNT: 15.1 % (ref 10–15)
ERYTHROCYTE [DISTWIDTH] IN BLOOD BY AUTOMATED COUNT: 15.2 % (ref 10–15)
ERYTHROCYTE [DISTWIDTH] IN BLOOD BY AUTOMATED COUNT: 16.8 % (ref 10–15)
ERYTHROCYTE [DISTWIDTH] IN BLOOD BY AUTOMATED COUNT: 16.8 % (ref 10–15)
ERYTHROCYTE [DISTWIDTH] IN BLOOD BY AUTOMATED COUNT: 17 % (ref 10–15)
ERYTHROCYTE [DISTWIDTH] IN BLOOD BY AUTOMATED COUNT: 17.1 % (ref 10–15)
ERYTHROCYTE [DISTWIDTH] IN BLOOD BY AUTOMATED COUNT: 17.3 % (ref 10–15)
ERYTHROCYTE [DISTWIDTH] IN BLOOD BY AUTOMATED COUNT: 17.3 % (ref 10–15)
ERYTHROCYTE [DISTWIDTH] IN BLOOD BY AUTOMATED COUNT: 17.5 % (ref 10–15)
ERYTHROCYTE [DISTWIDTH] IN BLOOD BY AUTOMATED COUNT: 17.9 % (ref 10–15)
ERYTHROCYTE [DISTWIDTH] IN BLOOD BY AUTOMATED COUNT: 18.4 % (ref 10–15)
ERYTHROCYTE [DISTWIDTH] IN BLOOD BY AUTOMATED COUNT: 18.6 % (ref 10–15)
ERYTHROCYTE [DISTWIDTH] IN BLOOD BY AUTOMATED COUNT: 18.7 % (ref 10–15)
ERYTHROCYTE [DISTWIDTH] IN BLOOD BY AUTOMATED COUNT: 18.8 % (ref 10–15)
ERYTHROCYTE [DISTWIDTH] IN BLOOD BY AUTOMATED COUNT: 18.8 % (ref 10–15)
ERYTHROCYTE [DISTWIDTH] IN BLOOD BY AUTOMATED COUNT: 18.9 % (ref 10–15)
ERYTHROCYTE [DISTWIDTH] IN BLOOD BY AUTOMATED COUNT: 19.3 % (ref 10–15)
ERYTHROCYTE [DISTWIDTH] IN BLOOD BY AUTOMATED COUNT: 19.4 % (ref 10–15)
ERYTHROCYTE [DISTWIDTH] IN BLOOD BY AUTOMATED COUNT: 19.6 % (ref 10–15)
ERYTHROCYTE [DISTWIDTH] IN BLOOD BY AUTOMATED COUNT: 19.9 % (ref 10–15)
ERYTHROCYTE [DISTWIDTH] IN BLOOD BY AUTOMATED COUNT: 20.9 % (ref 10–15)
ERYTHROCYTE [DISTWIDTH] IN BLOOD BY AUTOMATED COUNT: 20.9 % (ref 10–15)
ERYTHROCYTE [DISTWIDTH] IN BLOOD BY AUTOMATED COUNT: 21.5 % (ref 10–15)
ERYTHROCYTE [DISTWIDTH] IN BLOOD BY AUTOMATED COUNT: 21.9 % (ref 10–15)
ERYTHROCYTE [DISTWIDTH] IN BLOOD BY AUTOMATED COUNT: 22 % (ref 10–15)
ERYTHROCYTE [DISTWIDTH] IN BLOOD BY AUTOMATED COUNT: 22.2 % (ref 10–15)
ERYTHROCYTE [DISTWIDTH] IN BLOOD BY AUTOMATED COUNT: 22.7 % (ref 10–15)
ERYTHROCYTE [DISTWIDTH] IN BLOOD BY AUTOMATED COUNT: 22.8 % (ref 10–15)
ERYTHROCYTE [DISTWIDTH] IN BLOOD BY AUTOMATED COUNT: 22.9 % (ref 10–15)
ERYTHROCYTE [DISTWIDTH] IN BLOOD BY AUTOMATED COUNT: 23 % (ref 10–15)
ERYTHROCYTE [DISTWIDTH] IN BLOOD BY AUTOMATED COUNT: 23.2 % (ref 10–15)
ERYTHROCYTE [DISTWIDTH] IN BLOOD BY AUTOMATED COUNT: 23.6 % (ref 10–15)
ERYTHROCYTE [DISTWIDTH] IN BLOOD BY AUTOMATED COUNT: 24.3 % (ref 10–15)
ERYTHROCYTE [DISTWIDTH] IN BLOOD BY AUTOMATED COUNT: 24.5 % (ref 10–15)
ERYTHROCYTE [DISTWIDTH] IN BLOOD BY AUTOMATED COUNT: 24.7 % (ref 10–15)
ERYTHROCYTE [DISTWIDTH] IN BLOOD BY AUTOMATED COUNT: 25 % (ref 10–15)
ERYTHROCYTE [SEDIMENTATION RATE] IN BLOOD BY WESTERGREN METHOD: 10 MM/HR (ref 0–30)
EXPTIME-PRE: 4.12 SEC
FEF2575-%PRED-PRE: 43 %
FEF2575-PRE: 0.91 L/SEC
FEF2575-PRED: 2.11 L/SEC
FEFMAX-%PRED-PRE: 38 %
FEFMAX-PRE: 2.41 L/SEC
FEFMAX-PRED: 6.23 L/SEC
FEV1-%PRED-PRE: 34 %
FEV1-PRE: 0.8 L
FEV1FEV6-PRE: 94 %
FEV1FEV6-PRED: 81 %
FEV1FVC-PRE: 94 %
FEV1FVC-PRED: 80 %
FIFMAX-PRE: 1.78 L/SEC
FLUAV H1 2009 PAND RNA SPEC QL NAA+PROBE: NOT DETECTED
FLUAV H1 RNA SPEC QL NAA+PROBE: NOT DETECTED
FLUAV H3 RNA SPEC QL NAA+PROBE: NOT DETECTED
FLUAV RNA SPEC QL NAA+PROBE: NEGATIVE
FLUAV RNA SPEC QL NAA+PROBE: NEGATIVE
FLUAV RNA SPEC QL NAA+PROBE: NOT DETECTED
FLUBV RNA RESP QL NAA+PROBE: NEGATIVE
FLUBV RNA RESP QL NAA+PROBE: NEGATIVE
FLUBV RNA SPEC QL NAA+PROBE: NOT DETECTED
FRAGMENTS BLD QL SMEAR: NORMAL
FRAGMENTS BLD QL SMEAR: SLIGHT
FVC-%PRED-PRE: 29 %
FVC-PRE: 0.85 L
FVC-PRED: 2.88 L
GALACTOMANNAN AG BAL QL: NEGATIVE
GALACTOMANNAN AG BAL QL: NEGATIVE
GALACTOMANNAN AG BAL QL: POSITIVE
GALACTOMANNAN AG SERPL QL IA: NEGATIVE
GALACTOMANNAN AG SERPL QL IA: NEGATIVE
GALACTOMANNAN AG SPEC IA-ACNC: 0.03
GALACTOMANNAN AG SPEC IA-ACNC: 0.04
GALACTOMANNAN AG SPEC IA-ACNC: 0.06
GALACTOMANNAN AG SPEC IA-ACNC: 0.07
GALACTOMANNAN AG SPEC IA-ACNC: 1.87
GGT SERPL-CCNC: 147 U/L (ref 5–36)
GLUCOSE BLD-MCNC: 145 MG/DL (ref 70–99)
GLUCOSE BLD-MCNC: 156 MG/DL (ref 70–99)
GLUCOSE BLD-MCNC: 164 MG/DL (ref 70–99)
GLUCOSE BLD-MCNC: 176 MG/DL (ref 70–99)
GLUCOSE BLD-MCNC: 204 MG/DL (ref 70–99)
GLUCOSE BLDC GLUCOMTR-MCNC: 101 MG/DL (ref 70–99)
GLUCOSE BLDC GLUCOMTR-MCNC: 103 MG/DL (ref 70–99)
GLUCOSE BLDC GLUCOMTR-MCNC: 104 MG/DL (ref 70–99)
GLUCOSE BLDC GLUCOMTR-MCNC: 104 MG/DL (ref 70–99)
GLUCOSE BLDC GLUCOMTR-MCNC: 105 MG/DL (ref 70–99)
GLUCOSE BLDC GLUCOMTR-MCNC: 105 MG/DL (ref 70–99)
GLUCOSE BLDC GLUCOMTR-MCNC: 107 MG/DL (ref 70–99)
GLUCOSE BLDC GLUCOMTR-MCNC: 111 MG/DL (ref 70–99)
GLUCOSE BLDC GLUCOMTR-MCNC: 111 MG/DL (ref 70–99)
GLUCOSE BLDC GLUCOMTR-MCNC: 114 MG/DL (ref 70–99)
GLUCOSE BLDC GLUCOMTR-MCNC: 114 MG/DL (ref 70–99)
GLUCOSE BLDC GLUCOMTR-MCNC: 115 MG/DL (ref 70–99)
GLUCOSE BLDC GLUCOMTR-MCNC: 116 MG/DL (ref 70–99)
GLUCOSE BLDC GLUCOMTR-MCNC: 116 MG/DL (ref 70–99)
GLUCOSE BLDC GLUCOMTR-MCNC: 117 MG/DL (ref 70–99)
GLUCOSE BLDC GLUCOMTR-MCNC: 118 MG/DL (ref 70–99)
GLUCOSE BLDC GLUCOMTR-MCNC: 118 MG/DL (ref 70–99)
GLUCOSE BLDC GLUCOMTR-MCNC: 119 MG/DL (ref 70–99)
GLUCOSE BLDC GLUCOMTR-MCNC: 120 MG/DL (ref 70–99)
GLUCOSE BLDC GLUCOMTR-MCNC: 121 MG/DL (ref 70–99)
GLUCOSE BLDC GLUCOMTR-MCNC: 122 MG/DL (ref 70–99)
GLUCOSE BLDC GLUCOMTR-MCNC: 123 MG/DL (ref 70–99)
GLUCOSE BLDC GLUCOMTR-MCNC: 124 MG/DL (ref 70–99)
GLUCOSE BLDC GLUCOMTR-MCNC: 125 MG/DL (ref 70–99)
GLUCOSE BLDC GLUCOMTR-MCNC: 125 MG/DL (ref 70–99)
GLUCOSE BLDC GLUCOMTR-MCNC: 126 MG/DL (ref 70–99)
GLUCOSE BLDC GLUCOMTR-MCNC: 127 MG/DL (ref 70–99)
GLUCOSE BLDC GLUCOMTR-MCNC: 128 MG/DL (ref 70–99)
GLUCOSE BLDC GLUCOMTR-MCNC: 129 MG/DL (ref 70–99)
GLUCOSE BLDC GLUCOMTR-MCNC: 129 MG/DL (ref 70–99)
GLUCOSE BLDC GLUCOMTR-MCNC: 130 MG/DL (ref 70–99)
GLUCOSE BLDC GLUCOMTR-MCNC: 130 MG/DL (ref 70–99)
GLUCOSE BLDC GLUCOMTR-MCNC: 131 MG/DL (ref 70–99)
GLUCOSE BLDC GLUCOMTR-MCNC: 132 MG/DL (ref 70–99)
GLUCOSE BLDC GLUCOMTR-MCNC: 133 MG/DL (ref 70–99)
GLUCOSE BLDC GLUCOMTR-MCNC: 134 MG/DL (ref 70–99)
GLUCOSE BLDC GLUCOMTR-MCNC: 134 MG/DL (ref 70–99)
GLUCOSE BLDC GLUCOMTR-MCNC: 135 MG/DL (ref 70–99)
GLUCOSE BLDC GLUCOMTR-MCNC: 136 MG/DL (ref 70–99)
GLUCOSE BLDC GLUCOMTR-MCNC: 136 MG/DL (ref 70–99)
GLUCOSE BLDC GLUCOMTR-MCNC: 137 MG/DL (ref 70–99)
GLUCOSE BLDC GLUCOMTR-MCNC: 138 MG/DL (ref 70–99)
GLUCOSE BLDC GLUCOMTR-MCNC: 139 MG/DL (ref 70–99)
GLUCOSE BLDC GLUCOMTR-MCNC: 141 MG/DL (ref 70–99)
GLUCOSE BLDC GLUCOMTR-MCNC: 142 MG/DL (ref 70–99)
GLUCOSE BLDC GLUCOMTR-MCNC: 143 MG/DL (ref 70–99)
GLUCOSE BLDC GLUCOMTR-MCNC: 144 MG/DL (ref 70–99)
GLUCOSE BLDC GLUCOMTR-MCNC: 145 MG/DL (ref 70–99)
GLUCOSE BLDC GLUCOMTR-MCNC: 146 MG/DL (ref 70–99)
GLUCOSE BLDC GLUCOMTR-MCNC: 146 MG/DL (ref 70–99)
GLUCOSE BLDC GLUCOMTR-MCNC: 147 MG/DL (ref 70–99)
GLUCOSE BLDC GLUCOMTR-MCNC: 148 MG/DL (ref 70–99)
GLUCOSE BLDC GLUCOMTR-MCNC: 148 MG/DL (ref 70–99)
GLUCOSE BLDC GLUCOMTR-MCNC: 149 MG/DL (ref 70–99)
GLUCOSE BLDC GLUCOMTR-MCNC: 151 MG/DL (ref 70–99)
GLUCOSE BLDC GLUCOMTR-MCNC: 151 MG/DL (ref 70–99)
GLUCOSE BLDC GLUCOMTR-MCNC: 152 MG/DL (ref 70–99)
GLUCOSE BLDC GLUCOMTR-MCNC: 153 MG/DL (ref 70–99)
GLUCOSE BLDC GLUCOMTR-MCNC: 154 MG/DL (ref 70–99)
GLUCOSE BLDC GLUCOMTR-MCNC: 155 MG/DL (ref 70–99)
GLUCOSE BLDC GLUCOMTR-MCNC: 156 MG/DL (ref 70–99)
GLUCOSE BLDC GLUCOMTR-MCNC: 157 MG/DL (ref 70–99)
GLUCOSE BLDC GLUCOMTR-MCNC: 158 MG/DL (ref 70–99)
GLUCOSE BLDC GLUCOMTR-MCNC: 159 MG/DL (ref 70–99)
GLUCOSE BLDC GLUCOMTR-MCNC: 160 MG/DL (ref 70–99)
GLUCOSE BLDC GLUCOMTR-MCNC: 161 MG/DL (ref 70–99)
GLUCOSE BLDC GLUCOMTR-MCNC: 162 MG/DL (ref 70–99)
GLUCOSE BLDC GLUCOMTR-MCNC: 165 MG/DL (ref 70–99)
GLUCOSE BLDC GLUCOMTR-MCNC: 165 MG/DL (ref 70–99)
GLUCOSE BLDC GLUCOMTR-MCNC: 166 MG/DL (ref 70–99)
GLUCOSE BLDC GLUCOMTR-MCNC: 167 MG/DL (ref 70–99)
GLUCOSE BLDC GLUCOMTR-MCNC: 167 MG/DL (ref 70–99)
GLUCOSE BLDC GLUCOMTR-MCNC: 168 MG/DL (ref 70–99)
GLUCOSE BLDC GLUCOMTR-MCNC: 169 MG/DL (ref 70–99)
GLUCOSE BLDC GLUCOMTR-MCNC: 170 MG/DL (ref 70–99)
GLUCOSE BLDC GLUCOMTR-MCNC: 171 MG/DL (ref 70–99)
GLUCOSE BLDC GLUCOMTR-MCNC: 171 MG/DL (ref 70–99)
GLUCOSE BLDC GLUCOMTR-MCNC: 172 MG/DL (ref 70–99)
GLUCOSE BLDC GLUCOMTR-MCNC: 173 MG/DL (ref 70–99)
GLUCOSE BLDC GLUCOMTR-MCNC: 174 MG/DL (ref 70–99)
GLUCOSE BLDC GLUCOMTR-MCNC: 174 MG/DL (ref 70–99)
GLUCOSE BLDC GLUCOMTR-MCNC: 175 MG/DL (ref 70–99)
GLUCOSE BLDC GLUCOMTR-MCNC: 176 MG/DL (ref 70–99)
GLUCOSE BLDC GLUCOMTR-MCNC: 177 MG/DL (ref 70–99)
GLUCOSE BLDC GLUCOMTR-MCNC: 177 MG/DL (ref 70–99)
GLUCOSE BLDC GLUCOMTR-MCNC: 178 MG/DL (ref 70–99)
GLUCOSE BLDC GLUCOMTR-MCNC: 178 MG/DL (ref 70–99)
GLUCOSE BLDC GLUCOMTR-MCNC: 179 MG/DL (ref 70–99)
GLUCOSE BLDC GLUCOMTR-MCNC: 179 MG/DL (ref 70–99)
GLUCOSE BLDC GLUCOMTR-MCNC: 180 MG/DL (ref 70–99)
GLUCOSE BLDC GLUCOMTR-MCNC: 180 MG/DL (ref 70–99)
GLUCOSE BLDC GLUCOMTR-MCNC: 181 MG/DL (ref 70–99)
GLUCOSE BLDC GLUCOMTR-MCNC: 182 MG/DL (ref 70–99)
GLUCOSE BLDC GLUCOMTR-MCNC: 184 MG/DL (ref 70–99)
GLUCOSE BLDC GLUCOMTR-MCNC: 185 MG/DL (ref 70–99)
GLUCOSE BLDC GLUCOMTR-MCNC: 186 MG/DL (ref 70–99)
GLUCOSE BLDC GLUCOMTR-MCNC: 186 MG/DL (ref 70–99)
GLUCOSE BLDC GLUCOMTR-MCNC: 187 MG/DL (ref 70–99)
GLUCOSE BLDC GLUCOMTR-MCNC: 187 MG/DL (ref 70–99)
GLUCOSE BLDC GLUCOMTR-MCNC: 189 MG/DL (ref 70–99)
GLUCOSE BLDC GLUCOMTR-MCNC: 189 MG/DL (ref 70–99)
GLUCOSE BLDC GLUCOMTR-MCNC: 190 MG/DL (ref 70–99)
GLUCOSE BLDC GLUCOMTR-MCNC: 192 MG/DL (ref 70–99)
GLUCOSE BLDC GLUCOMTR-MCNC: 193 MG/DL (ref 70–99)
GLUCOSE BLDC GLUCOMTR-MCNC: 194 MG/DL (ref 70–99)
GLUCOSE BLDC GLUCOMTR-MCNC: 195 MG/DL (ref 70–99)
GLUCOSE BLDC GLUCOMTR-MCNC: 196 MG/DL (ref 70–99)
GLUCOSE BLDC GLUCOMTR-MCNC: 197 MG/DL (ref 70–99)
GLUCOSE BLDC GLUCOMTR-MCNC: 198 MG/DL (ref 70–99)
GLUCOSE BLDC GLUCOMTR-MCNC: 198 MG/DL (ref 70–99)
GLUCOSE BLDC GLUCOMTR-MCNC: 199 MG/DL (ref 70–99)
GLUCOSE BLDC GLUCOMTR-MCNC: 200 MG/DL (ref 70–99)
GLUCOSE BLDC GLUCOMTR-MCNC: 201 MG/DL (ref 70–99)
GLUCOSE BLDC GLUCOMTR-MCNC: 203 MG/DL (ref 70–99)
GLUCOSE BLDC GLUCOMTR-MCNC: 204 MG/DL (ref 70–99)
GLUCOSE BLDC GLUCOMTR-MCNC: 206 MG/DL (ref 70–99)
GLUCOSE BLDC GLUCOMTR-MCNC: 208 MG/DL (ref 70–99)
GLUCOSE BLDC GLUCOMTR-MCNC: 208 MG/DL (ref 70–99)
GLUCOSE BLDC GLUCOMTR-MCNC: 209 MG/DL (ref 70–99)
GLUCOSE BLDC GLUCOMTR-MCNC: 210 MG/DL (ref 70–99)
GLUCOSE BLDC GLUCOMTR-MCNC: 211 MG/DL (ref 70–99)
GLUCOSE BLDC GLUCOMTR-MCNC: 212 MG/DL (ref 70–99)
GLUCOSE BLDC GLUCOMTR-MCNC: 213 MG/DL (ref 70–99)
GLUCOSE BLDC GLUCOMTR-MCNC: 215 MG/DL (ref 70–99)
GLUCOSE BLDC GLUCOMTR-MCNC: 216 MG/DL (ref 70–99)
GLUCOSE BLDC GLUCOMTR-MCNC: 216 MG/DL (ref 70–99)
GLUCOSE BLDC GLUCOMTR-MCNC: 217 MG/DL (ref 70–99)
GLUCOSE BLDC GLUCOMTR-MCNC: 219 MG/DL (ref 70–99)
GLUCOSE BLDC GLUCOMTR-MCNC: 220 MG/DL (ref 70–99)
GLUCOSE BLDC GLUCOMTR-MCNC: 220 MG/DL (ref 70–99)
GLUCOSE BLDC GLUCOMTR-MCNC: 222 MG/DL (ref 70–99)
GLUCOSE BLDC GLUCOMTR-MCNC: 224 MG/DL (ref 70–99)
GLUCOSE BLDC GLUCOMTR-MCNC: 224 MG/DL (ref 70–99)
GLUCOSE BLDC GLUCOMTR-MCNC: 225 MG/DL (ref 70–99)
GLUCOSE BLDC GLUCOMTR-MCNC: 225 MG/DL (ref 70–99)
GLUCOSE BLDC GLUCOMTR-MCNC: 231 MG/DL (ref 70–99)
GLUCOSE BLDC GLUCOMTR-MCNC: 232 MG/DL (ref 70–99)
GLUCOSE BLDC GLUCOMTR-MCNC: 235 MG/DL (ref 70–99)
GLUCOSE BLDC GLUCOMTR-MCNC: 236 MG/DL (ref 70–99)
GLUCOSE BLDC GLUCOMTR-MCNC: 237 MG/DL (ref 70–99)
GLUCOSE BLDC GLUCOMTR-MCNC: 238 MG/DL (ref 70–99)
GLUCOSE BLDC GLUCOMTR-MCNC: 239 MG/DL (ref 70–99)
GLUCOSE BLDC GLUCOMTR-MCNC: 239 MG/DL (ref 70–99)
GLUCOSE BLDC GLUCOMTR-MCNC: 241 MG/DL (ref 70–99)
GLUCOSE BLDC GLUCOMTR-MCNC: 242 MG/DL (ref 70–99)
GLUCOSE BLDC GLUCOMTR-MCNC: 242 MG/DL (ref 70–99)
GLUCOSE BLDC GLUCOMTR-MCNC: 244 MG/DL (ref 70–99)
GLUCOSE BLDC GLUCOMTR-MCNC: 244 MG/DL (ref 70–99)
GLUCOSE BLDC GLUCOMTR-MCNC: 246 MG/DL (ref 70–99)
GLUCOSE BLDC GLUCOMTR-MCNC: 247 MG/DL (ref 70–99)
GLUCOSE BLDC GLUCOMTR-MCNC: 252 MG/DL (ref 70–99)
GLUCOSE BLDC GLUCOMTR-MCNC: 255 MG/DL (ref 70–99)
GLUCOSE BLDC GLUCOMTR-MCNC: 258 MG/DL (ref 70–99)
GLUCOSE BLDC GLUCOMTR-MCNC: 258 MG/DL (ref 70–99)
GLUCOSE BLDC GLUCOMTR-MCNC: 266 MG/DL (ref 70–99)
GLUCOSE BLDC GLUCOMTR-MCNC: 270 MG/DL (ref 70–99)
GLUCOSE BLDC GLUCOMTR-MCNC: 278 MG/DL (ref 70–99)
GLUCOSE BLDC GLUCOMTR-MCNC: 290 MG/DL (ref 70–99)
GLUCOSE BLDC GLUCOMTR-MCNC: 292 MG/DL (ref 70–99)
GLUCOSE BLDC GLUCOMTR-MCNC: 40 MG/DL (ref 70–99)
GLUCOSE BLDC GLUCOMTR-MCNC: 64 MG/DL (ref 70–99)
GLUCOSE BLDC GLUCOMTR-MCNC: 66 MG/DL (ref 70–99)
GLUCOSE BLDC GLUCOMTR-MCNC: 74 MG/DL (ref 70–99)
GLUCOSE BLDC GLUCOMTR-MCNC: 76 MG/DL (ref 70–99)
GLUCOSE BLDC GLUCOMTR-MCNC: 77 MG/DL (ref 70–99)
GLUCOSE BLDC GLUCOMTR-MCNC: 80 MG/DL (ref 70–99)
GLUCOSE BLDC GLUCOMTR-MCNC: 81 MG/DL (ref 70–99)
GLUCOSE BLDC GLUCOMTR-MCNC: 82 MG/DL (ref 70–99)
GLUCOSE BLDC GLUCOMTR-MCNC: 82 MG/DL (ref 70–99)
GLUCOSE BLDC GLUCOMTR-MCNC: 85 MG/DL (ref 70–99)
GLUCOSE BLDC GLUCOMTR-MCNC: 88 MG/DL (ref 70–99)
GLUCOSE BLDC GLUCOMTR-MCNC: 91 MG/DL (ref 70–99)
GLUCOSE BLDC GLUCOMTR-MCNC: 96 MG/DL (ref 70–99)
GLUCOSE BLDC GLUCOMTR-MCNC: 97 MG/DL (ref 70–99)
GLUCOSE BLDC GLUCOMTR-MCNC: 99 MG/DL (ref 70–99)
GLUCOSE SERPL-MCNC: 100 MG/DL (ref 70–99)
GLUCOSE SERPL-MCNC: 107 MG/DL (ref 70–99)
GLUCOSE SERPL-MCNC: 115 MG/DL (ref 70–99)
GLUCOSE SERPL-MCNC: 121 MG/DL (ref 70–99)
GLUCOSE SERPL-MCNC: 122 MG/DL (ref 70–99)
GLUCOSE SERPL-MCNC: 123 MG/DL (ref 70–99)
GLUCOSE SERPL-MCNC: 126 MG/DL (ref 70–99)
GLUCOSE SERPL-MCNC: 128 MG/DL (ref 70–99)
GLUCOSE SERPL-MCNC: 128 MG/DL (ref 70–99)
GLUCOSE SERPL-MCNC: 129 MG/DL (ref 70–99)
GLUCOSE SERPL-MCNC: 130 MG/DL (ref 70–99)
GLUCOSE SERPL-MCNC: 131 MG/DL (ref 70–99)
GLUCOSE SERPL-MCNC: 131 MG/DL (ref 70–99)
GLUCOSE SERPL-MCNC: 136 MG/DL (ref 70–99)
GLUCOSE SERPL-MCNC: 143 MG/DL (ref 70–99)
GLUCOSE SERPL-MCNC: 150 MG/DL (ref 70–99)
GLUCOSE SERPL-MCNC: 150 MG/DL (ref 70–99)
GLUCOSE SERPL-MCNC: 151 MG/DL (ref 70–99)
GLUCOSE SERPL-MCNC: 154 MG/DL (ref 70–99)
GLUCOSE SERPL-MCNC: 154 MG/DL (ref 70–99)
GLUCOSE SERPL-MCNC: 155 MG/DL (ref 70–99)
GLUCOSE SERPL-MCNC: 157 MG/DL (ref 70–99)
GLUCOSE SERPL-MCNC: 159 MG/DL (ref 70–99)
GLUCOSE SERPL-MCNC: 159 MG/DL (ref 70–99)
GLUCOSE SERPL-MCNC: 161 MG/DL (ref 70–99)
GLUCOSE SERPL-MCNC: 164 MG/DL (ref 70–99)
GLUCOSE SERPL-MCNC: 166 MG/DL (ref 70–99)
GLUCOSE SERPL-MCNC: 167 MG/DL (ref 70–99)
GLUCOSE SERPL-MCNC: 170 MG/DL (ref 70–99)
GLUCOSE SERPL-MCNC: 173 MG/DL (ref 70–99)
GLUCOSE SERPL-MCNC: 177 MG/DL (ref 70–99)
GLUCOSE SERPL-MCNC: 180 MG/DL (ref 70–99)
GLUCOSE SERPL-MCNC: 180 MG/DL (ref 70–99)
GLUCOSE SERPL-MCNC: 182 MG/DL (ref 70–99)
GLUCOSE SERPL-MCNC: 185 MG/DL (ref 70–99)
GLUCOSE SERPL-MCNC: 187 MG/DL (ref 70–99)
GLUCOSE SERPL-MCNC: 192 MG/DL (ref 70–99)
GLUCOSE SERPL-MCNC: 193 MG/DL (ref 70–99)
GLUCOSE SERPL-MCNC: 196 MG/DL (ref 70–99)
GLUCOSE SERPL-MCNC: 199 MG/DL (ref 70–99)
GLUCOSE SERPL-MCNC: 199 MG/DL (ref 70–99)
GLUCOSE SERPL-MCNC: 200 MG/DL (ref 70–99)
GLUCOSE SERPL-MCNC: 202 MG/DL (ref 70–99)
GLUCOSE SERPL-MCNC: 206 MG/DL (ref 70–99)
GLUCOSE SERPL-MCNC: 207 MG/DL (ref 70–99)
GLUCOSE SERPL-MCNC: 217 MG/DL (ref 70–99)
GLUCOSE SERPL-MCNC: 225 MG/DL (ref 70–99)
GLUCOSE SERPL-MCNC: 229 MG/DL (ref 70–99)
GLUCOSE SERPL-MCNC: 253 MG/DL (ref 70–99)
GLUCOSE SERPL-MCNC: 255 MG/DL (ref 70–99)
GLUCOSE SERPL-MCNC: 265 MG/DL (ref 70–99)
GLUCOSE SERPL-MCNC: 279 MG/DL (ref 70–99)
GLUCOSE SERPL-MCNC: 88 MG/DL (ref 70–99)
GLUCOSE SERPL-MCNC: 90 MG/DL (ref 70–99)
GLUCOSE SERPL-MCNC: 93 MG/DL (ref 70–99)
GLUCOSE UR STRIP-MCNC: NEGATIVE MG/DL
GRAM STAIN RESULT: ABNORMAL
GRAM STAIN RESULT: NORMAL
HADV DNA SPEC QL NAA+PROBE: NOT DETECTED
HBA1C MFR BLD: 5.1 %
HBV SURFACE AB SERPL IA-ACNC: <3.5 M[IU]/ML
HBV SURFACE AB SERPL IA-ACNC: NONREACTIVE M[IU]/ML
HBV SURFACE AG SERPL QL IA: NONREACTIVE
HCO3 BLD-SCNC: 23 MMOL/L (ref 21–28)
HCO3 BLD-SCNC: 24 MMOL/L (ref 21–28)
HCO3 BLD-SCNC: 25 MMOL/L (ref 21–28)
HCO3 BLDV-SCNC: 25 MMOL/L (ref 21–28)
HCO3 BLDV-SCNC: 26 MMOL/L (ref 21–28)
HCO3 BLDV-SCNC: 26 MMOL/L (ref 21–28)
HCO3 BLDV-SCNC: 27 MMOL/L (ref 21–28)
HCO3 BLDV-SCNC: 28 MMOL/L (ref 21–28)
HCO3 BLDV-SCNC: 29 MMOL/L (ref 21–28)
HCO3 BLDV-SCNC: 29 MMOL/L (ref 21–28)
HCO3 BLDV-SCNC: 30 MMOL/L (ref 21–28)
HCO3 BLDV-SCNC: 30 MMOL/L (ref 21–28)
HCO3 BLDV-SCNC: 31 MMOL/L (ref 21–28)
HCO3 BLDV-SCNC: 31 MMOL/L (ref 21–28)
HCO3 BLDV-SCNC: 32 MMOL/L (ref 21–28)
HCO3 BLDV-SCNC: 32 MMOL/L (ref 21–28)
HCO3 SERPL-SCNC: 24 MMOL/L (ref 22–29)
HCO3 SERPL-SCNC: 27 MMOL/L (ref 22–29)
HCO3 SERPL-SCNC: 27 MMOL/L (ref 22–29)
HCO3 SERPL-SCNC: 28 MMOL/L (ref 22–29)
HCO3 SERPL-SCNC: 28 MMOL/L (ref 22–29)
HCO3 SERPL-SCNC: 29 MMOL/L (ref 22–29)
HCO3 SERPL-SCNC: 30 MMOL/L (ref 22–29)
HCOV PNL SPEC NAA+PROBE: NOT DETECTED
HCT VFR BLD AUTO: 21.8 % (ref 35–47)
HCT VFR BLD AUTO: 21.8 % (ref 35–47)
HCT VFR BLD AUTO: 21.9 % (ref 35–47)
HCT VFR BLD AUTO: 22 % (ref 35–47)
HCT VFR BLD AUTO: 22 % (ref 35–47)
HCT VFR BLD AUTO: 22.3 % (ref 35–47)
HCT VFR BLD AUTO: 22.4 % (ref 35–47)
HCT VFR BLD AUTO: 22.5 % (ref 35–47)
HCT VFR BLD AUTO: 22.9 % (ref 35–47)
HCT VFR BLD AUTO: 22.9 % (ref 35–47)
HCT VFR BLD AUTO: 23 % (ref 35–47)
HCT VFR BLD AUTO: 23 % (ref 35–47)
HCT VFR BLD AUTO: 23.1 % (ref 35–47)
HCT VFR BLD AUTO: 23.3 % (ref 35–47)
HCT VFR BLD AUTO: 23.4 % (ref 35–47)
HCT VFR BLD AUTO: 23.7 % (ref 35–47)
HCT VFR BLD AUTO: 23.8 % (ref 35–47)
HCT VFR BLD AUTO: 24.3 % (ref 35–47)
HCT VFR BLD AUTO: 24.3 % (ref 35–47)
HCT VFR BLD AUTO: 24.6 % (ref 35–47)
HCT VFR BLD AUTO: 24.8 % (ref 35–47)
HCT VFR BLD AUTO: 24.8 % (ref 35–47)
HCT VFR BLD AUTO: 24.9 % (ref 35–47)
HCT VFR BLD AUTO: 25.1 % (ref 35–47)
HCT VFR BLD AUTO: 25.2 % (ref 35–47)
HCT VFR BLD AUTO: 25.3 % (ref 35–47)
HCT VFR BLD AUTO: 25.3 % (ref 35–47)
HCT VFR BLD AUTO: 25.5 % (ref 35–47)
HCT VFR BLD AUTO: 25.6 % (ref 35–47)
HCT VFR BLD AUTO: 25.9 % (ref 35–47)
HCT VFR BLD AUTO: 26.4 % (ref 35–47)
HCT VFR BLD AUTO: 26.7 % (ref 35–47)
HCT VFR BLD AUTO: 27.1 % (ref 35–47)
HCT VFR BLD AUTO: 27.2 % (ref 35–47)
HCT VFR BLD AUTO: 27.2 % (ref 35–47)
HCT VFR BLD AUTO: 28.3 % (ref 35–47)
HCT VFR BLD AUTO: 29.1 % (ref 35–47)
HCT VFR BLD AUTO: 29.4 % (ref 35–47)
HCT VFR BLD AUTO: 29.8 % (ref 35–47)
HCT VFR BLD AUTO: 30.9 % (ref 35–47)
HCT VFR BLD AUTO: 31.1 % (ref 35–47)
HCT VFR BLD AUTO: 31.2 % (ref 35–47)
HCT VFR BLD AUTO: 32.2 % (ref 35–47)
HCT VFR BLD AUTO: 33.3 % (ref 35–47)
HCT VFR BLD AUTO: 35.8 % (ref 35–47)
HCT VFR BLD AUTO: 36.9 % (ref 35–47)
HCT VFR BLD AUTO: 39.5 % (ref 35–47)
HCT VFR BLD AUTO: 39.7 % (ref 35–47)
HGB BLD-MCNC: 10.1 G/DL (ref 11.7–15.7)
HGB BLD-MCNC: 10.4 G/DL (ref 11.7–15.7)
HGB BLD-MCNC: 10.4 G/DL (ref 11.7–15.7)
HGB BLD-MCNC: 10.5 G/DL (ref 11.7–15.7)
HGB BLD-MCNC: 11.2 G/DL (ref 11.7–15.7)
HGB BLD-MCNC: 11.2 G/DL (ref 11.7–15.7)
HGB BLD-MCNC: 12.1 G/DL (ref 11.7–15.7)
HGB BLD-MCNC: 12.1 G/DL (ref 11.7–15.7)
HGB BLD-MCNC: 6.8 G/DL (ref 11.7–15.7)
HGB BLD-MCNC: 6.9 G/DL (ref 11.7–15.7)
HGB BLD-MCNC: 7 G/DL (ref 11.7–15.7)
HGB BLD-MCNC: 7.1 G/DL (ref 11.7–15.7)
HGB BLD-MCNC: 7.1 G/DL (ref 11.7–15.7)
HGB BLD-MCNC: 7.2 G/DL (ref 11.7–15.7)
HGB BLD-MCNC: 7.3 G/DL (ref 11.7–15.7)
HGB BLD-MCNC: 7.3 G/DL (ref 11.7–15.7)
HGB BLD-MCNC: 7.4 G/DL (ref 11.7–15.7)
HGB BLD-MCNC: 7.4 G/DL (ref 11.7–15.7)
HGB BLD-MCNC: 7.5 G/DL (ref 11.7–15.7)
HGB BLD-MCNC: 7.5 G/DL (ref 11.7–15.7)
HGB BLD-MCNC: 7.6 G/DL (ref 11.7–15.7)
HGB BLD-MCNC: 7.8 G/DL (ref 11.7–15.7)
HGB BLD-MCNC: 7.9 G/DL (ref 11.7–15.7)
HGB BLD-MCNC: 8 G/DL (ref 11.7–15.7)
HGB BLD-MCNC: 8.1 G/DL (ref 11.7–15.7)
HGB BLD-MCNC: 8.4 G/DL (ref 11.7–15.7)
HGB BLD-MCNC: 8.7 G/DL (ref 11.7–15.7)
HGB BLD-MCNC: 8.7 G/DL (ref 11.7–15.7)
HGB BLD-MCNC: 8.8 G/DL (ref 11.7–15.7)
HGB BLD-MCNC: 8.8 G/DL (ref 11.7–15.7)
HGB BLD-MCNC: 8.9 G/DL (ref 11.7–15.7)
HGB BLD-MCNC: 9 G/DL (ref 11.7–15.7)
HGB BLD-MCNC: 9 G/DL (ref 11.7–15.7)
HGB BLD-MCNC: 9.6 G/DL (ref 11.7–15.7)
HGB BLD-MCNC: 9.6 G/DL (ref 11.7–15.7)
HGB BLD-MCNC: 9.7 G/DL (ref 11.7–15.7)
HGB C CRYSTALS: NORMAL
HGB UR QL STRIP: ABNORMAL
HMPV RNA SPEC QL NAA+PROBE: NOT DETECTED
HOWELL-JOLLY BOD BLD QL SMEAR: NORMAL
HPIV1 RNA SPEC QL NAA+PROBE: NOT DETECTED
HPIV2 RNA SPEC QL NAA+PROBE: NOT DETECTED
HPIV3 RNA SPEC QL NAA+PROBE: NOT DETECTED
HPIV4 RNA SPEC QL NAA+PROBE: NOT DETECTED
IGG SERPL-MCNC: 826 MG/DL (ref 610–1616)
IMM GRANULOCYTES # BLD: 0 10E3/UL
IMM GRANULOCYTES # BLD: 0.1 10E3/UL
IMM GRANULOCYTES # BLD: 0.2 10E3/UL
IMM GRANULOCYTES # BLD: 0.2 10E3/UL
IMM GRANULOCYTES # BLD: ABNORMAL 10*3/UL
IMM GRANULOCYTES NFR BLD: 0 %
IMM GRANULOCYTES NFR BLD: 0 %
IMM GRANULOCYTES NFR BLD: 1 %
IMM GRANULOCYTES NFR BLD: 2 %
IMM GRANULOCYTES NFR BLD: 3 %
IMM GRANULOCYTES NFR BLD: 4 %
IMM GRANULOCYTES NFR BLD: ABNORMAL %
IMMUKNOW IMMUNE CELL FUNCTION: 227 NG/ML
IMMUKNOW IMMUNE CELL FUNCTION: 87 NG/ML
IMMUKNOW IMMUNE CELL FUNCTION: 89 NG/ML
INR PPP: 0.96 (ref 0.85–1.15)
INR PPP: 0.98 (ref 0.85–1.15)
INR PPP: 0.98 (ref 0.85–1.15)
INTERPRETATION ECG - MUSE: NORMAL
ISSUE DATE AND TIME: NORMAL
ISSUE DATE AND TIME: NORMAL
KETONES UR STRIP-MCNC: NEGATIVE MG/DL
KOH PREPARATION: NORMAL
L PNEUMO1 AG UR QL IA: NEGATIVE
LACTATE BLD-SCNC: 0.4 MMOL/L
LACTATE BLD-SCNC: 0.5 MMOL/L
LACTATE BLD-SCNC: 0.8 MMOL/L (ref 0.7–2)
LACTATE BLD-SCNC: 1 MMOL/L (ref 0.7–2)
LACTATE BLD-SCNC: 1.4 MMOL/L (ref 0.7–2)
LACTATE SERPL-SCNC: 0.6 MMOL/L (ref 0.7–2)
LACTATE SERPL-SCNC: 0.7 MMOL/L (ref 0.7–2)
LACTATE SERPL-SCNC: 0.8 MMOL/L (ref 0.7–2)
LACTATE SERPL-SCNC: 0.8 MMOL/L (ref 0.7–2)
LACTATE SERPL-SCNC: 0.9 MMOL/L (ref 0.7–2)
LACTATE SERPL-SCNC: 1.2 MMOL/L (ref 0.7–2)
LACTATE SERPL-SCNC: 1.3 MMOL/L (ref 0.7–2)
LEUKOCYTE ESTERASE UR QL STRIP: ABNORMAL
LIPASE SERPL-CCNC: 39 U/L (ref 13–60)
LIPASE SERPL-CCNC: 40 U/L (ref 13–60)
LVEF ECHO: NORMAL
LYMPHOCYTES # BLD AUTO: 0.2 10E3/UL (ref 0.8–5.3)
LYMPHOCYTES # BLD AUTO: 0.4 10E3/UL (ref 0.8–5.3)
LYMPHOCYTES # BLD AUTO: 0.4 10E3/UL (ref 0.8–5.3)
LYMPHOCYTES # BLD AUTO: 0.5 10E3/UL (ref 0.8–5.3)
LYMPHOCYTES # BLD AUTO: 0.6 10E3/UL (ref 0.8–5.3)
LYMPHOCYTES # BLD AUTO: 0.7 10E3/UL (ref 0.8–5.3)
LYMPHOCYTES # BLD AUTO: 0.8 10E3/UL (ref 0.8–5.3)
LYMPHOCYTES # BLD AUTO: 1 10E3/UL (ref 0.8–5.3)
LYMPHOCYTES # BLD AUTO: 1.1 10E3/UL (ref 0.8–5.3)
LYMPHOCYTES # BLD AUTO: 1.3 10E3/UL (ref 0.8–5.3)
LYMPHOCYTES # BLD AUTO: 1.3 10E3/UL (ref 0.8–5.3)
LYMPHOCYTES # BLD AUTO: 1.4 10E3/UL (ref 0.8–5.3)
LYMPHOCYTES # BLD AUTO: ABNORMAL 10*3/UL
LYMPHOCYTES # BLD MANUAL: 0 10E3/UL (ref 0.8–5.3)
LYMPHOCYTES # BLD MANUAL: 0.1 10E3/UL (ref 0.8–5.3)
LYMPHOCYTES # BLD MANUAL: 0.3 10E3/UL (ref 0.8–5.3)
LYMPHOCYTES # BLD MANUAL: 0.3 10E3/UL (ref 0.8–5.3)
LYMPHOCYTES # BLD MANUAL: 0.5 10E3/UL (ref 0.8–5.3)
LYMPHOCYTES # BLD MANUAL: 0.6 10E3/UL (ref 0.8–5.3)
LYMPHOCYTES # BLD MANUAL: 0.8 10E3/UL (ref 0.8–5.3)
LYMPHOCYTES # BLD MANUAL: 0.9 10E3/UL (ref 0.8–5.3)
LYMPHOCYTES # BLD MANUAL: 0.9 10E3/UL (ref 0.8–5.3)
LYMPHOCYTES # BLD MANUAL: 1 10E3/UL (ref 0.8–5.3)
LYMPHOCYTES # BLD MANUAL: 1.1 10E3/UL (ref 0.8–5.3)
LYMPHOCYTES # BLD MANUAL: 1.6 10E3/UL (ref 0.8–5.3)
LYMPHOCYTES NFR BLD AUTO: 10 %
LYMPHOCYTES NFR BLD AUTO: 10 %
LYMPHOCYTES NFR BLD AUTO: 11 %
LYMPHOCYTES NFR BLD AUTO: 16 %
LYMPHOCYTES NFR BLD AUTO: 18 %
LYMPHOCYTES NFR BLD AUTO: 19 %
LYMPHOCYTES NFR BLD AUTO: 20 %
LYMPHOCYTES NFR BLD AUTO: 21 %
LYMPHOCYTES NFR BLD AUTO: 23 %
LYMPHOCYTES NFR BLD AUTO: 23 %
LYMPHOCYTES NFR BLD AUTO: 25 %
LYMPHOCYTES NFR BLD AUTO: 25 %
LYMPHOCYTES NFR BLD AUTO: 26 %
LYMPHOCYTES NFR BLD AUTO: 28 %
LYMPHOCYTES NFR BLD AUTO: 29 %
LYMPHOCYTES NFR BLD AUTO: 30 %
LYMPHOCYTES NFR BLD AUTO: 31 %
LYMPHOCYTES NFR BLD AUTO: 31 %
LYMPHOCYTES NFR BLD AUTO: 34 %
LYMPHOCYTES NFR BLD AUTO: 5 %
LYMPHOCYTES NFR BLD AUTO: ABNORMAL %
LYMPHOCYTES NFR BLD MANUAL: 0 %
LYMPHOCYTES NFR BLD MANUAL: 1 %
LYMPHOCYTES NFR BLD MANUAL: 18 %
LYMPHOCYTES NFR BLD MANUAL: 2 %
LYMPHOCYTES NFR BLD MANUAL: 20 %
LYMPHOCYTES NFR BLD MANUAL: 21 %
LYMPHOCYTES NFR BLD MANUAL: 28 %
LYMPHOCYTES NFR BLD MANUAL: 30 %
LYMPHOCYTES NFR BLD MANUAL: 30 %
LYMPHOCYTES NFR BLD MANUAL: 32 %
LYMPHOCYTES NFR BLD MANUAL: 35 %
LYMPHOCYTES NFR BLD MANUAL: 37 %
LYMPHOCYTES NFR BLD MANUAL: 38 %
LYMPHOCYTES NFR BLD MANUAL: 43 %
LYMPHOCYTES NFR BLD MANUAL: 59 %
LYMPHOCYTES NFR BLD MANUAL: 7 %
LYMPHOCYTES NFR BLD MANUAL: 9 %
LYMPHOCYTES NFR FLD MANUAL: 12 %
LYMPHOCYTES NFR FLD MANUAL: 2 %
LYMPHOCYTES NFR FLD MANUAL: 4 %
M PNEUMO DNA SPEC QL NAA+PROBE: NOT DETECTED
MAGNESIUM SERPL-MCNC: 1.5 MG/DL (ref 1.7–2.3)
MAGNESIUM SERPL-MCNC: 1.6 MG/DL (ref 1.7–2.3)
MAGNESIUM SERPL-MCNC: 1.7 MG/DL (ref 1.7–2.3)
MAGNESIUM SERPL-MCNC: 1.8 MG/DL (ref 1.7–2.3)
MAGNESIUM SERPL-MCNC: 1.9 MG/DL (ref 1.7–2.3)
MAGNESIUM SERPL-MCNC: 2 MG/DL (ref 1.7–2.3)
MAGNESIUM SERPL-MCNC: 2.1 MG/DL (ref 1.7–2.3)
MAGNESIUM SERPL-MCNC: 2.2 MG/DL (ref 1.7–2.3)
MAGNESIUM SERPL-MCNC: 2.3 MG/DL (ref 1.7–2.3)
MAGNESIUM SERPL-MCNC: 2.4 MG/DL (ref 1.7–2.3)
MAGNESIUM SERPL-MCNC: 2.5 MG/DL (ref 1.7–2.3)
MAGNESIUM SERPL-MCNC: 2.6 MG/DL (ref 1.7–2.3)
MAGNESIUM SERPL-MCNC: 2.6 MG/DL (ref 1.7–2.3)
MCH RBC QN AUTO: 30.2 PG (ref 26.5–33)
MCH RBC QN AUTO: 30.2 PG (ref 26.5–33)
MCH RBC QN AUTO: 30.3 PG (ref 26.5–33)
MCH RBC QN AUTO: 30.3 PG (ref 26.5–33)
MCH RBC QN AUTO: 30.4 PG (ref 26.5–33)
MCH RBC QN AUTO: 30.4 PG (ref 26.5–33)
MCH RBC QN AUTO: 30.6 PG (ref 26.5–33)
MCH RBC QN AUTO: 30.8 PG (ref 26.5–33)
MCH RBC QN AUTO: 30.9 PG (ref 26.5–33)
MCH RBC QN AUTO: 31 PG (ref 26.5–33)
MCH RBC QN AUTO: 31.1 PG (ref 26.5–33)
MCH RBC QN AUTO: 31.2 PG (ref 26.5–33)
MCH RBC QN AUTO: 31.2 PG (ref 26.5–33)
MCH RBC QN AUTO: 31.3 PG (ref 26.5–33)
MCH RBC QN AUTO: 31.4 PG (ref 26.5–33)
MCH RBC QN AUTO: 31.5 PG (ref 26.5–33)
MCH RBC QN AUTO: 31.5 PG (ref 26.5–33)
MCH RBC QN AUTO: 31.6 PG (ref 26.5–33)
MCH RBC QN AUTO: 31.7 PG (ref 26.5–33)
MCH RBC QN AUTO: 32.3 PG (ref 26.5–33)
MCH RBC QN AUTO: 32.3 PG (ref 26.5–33)
MCH RBC QN AUTO: 32.5 PG (ref 26.5–33)
MCH RBC QN AUTO: 32.5 PG (ref 26.5–33)
MCH RBC QN AUTO: 32.7 PG (ref 26.5–33)
MCH RBC QN AUTO: 32.7 PG (ref 26.5–33)
MCH RBC QN AUTO: 32.8 PG (ref 26.5–33)
MCH RBC QN AUTO: 33.3 PG (ref 26.5–33)
MCH RBC QN AUTO: 33.5 PG (ref 26.5–33)
MCH RBC QN AUTO: 34 PG (ref 26.5–33)
MCH RBC QN AUTO: 34 PG (ref 26.5–33)
MCH RBC QN AUTO: 34.1 PG (ref 26.5–33)
MCH RBC QN AUTO: 34.1 PG (ref 26.5–33)
MCH RBC QN AUTO: 34.2 PG (ref 26.5–33)
MCH RBC QN AUTO: 34.3 PG (ref 26.5–33)
MCH RBC QN AUTO: 34.4 PG (ref 26.5–33)
MCH RBC QN AUTO: 34.6 PG (ref 26.5–33)
MCH RBC QN AUTO: 34.8 PG (ref 26.5–33)
MCHC RBC AUTO-ENTMCNC: 29.9 G/DL (ref 31.5–36.5)
MCHC RBC AUTO-ENTMCNC: 30.3 G/DL (ref 31.5–36.5)
MCHC RBC AUTO-ENTMCNC: 30.4 G/DL (ref 31.5–36.5)
MCHC RBC AUTO-ENTMCNC: 30.4 G/DL (ref 31.5–36.5)
MCHC RBC AUTO-ENTMCNC: 30.5 G/DL (ref 31.5–36.5)
MCHC RBC AUTO-ENTMCNC: 30.6 G/DL (ref 31.5–36.5)
MCHC RBC AUTO-ENTMCNC: 30.9 G/DL (ref 31.5–36.5)
MCHC RBC AUTO-ENTMCNC: 31 G/DL (ref 31.5–36.5)
MCHC RBC AUTO-ENTMCNC: 31.1 G/DL (ref 31.5–36.5)
MCHC RBC AUTO-ENTMCNC: 31.2 G/DL (ref 31.5–36.5)
MCHC RBC AUTO-ENTMCNC: 31.3 G/DL (ref 31.5–36.5)
MCHC RBC AUTO-ENTMCNC: 31.4 G/DL (ref 31.5–36.5)
MCHC RBC AUTO-ENTMCNC: 31.5 G/DL (ref 31.5–36.5)
MCHC RBC AUTO-ENTMCNC: 31.6 G/DL (ref 31.5–36.5)
MCHC RBC AUTO-ENTMCNC: 31.6 G/DL (ref 31.5–36.5)
MCHC RBC AUTO-ENTMCNC: 31.7 G/DL (ref 31.5–36.5)
MCHC RBC AUTO-ENTMCNC: 31.8 G/DL (ref 31.5–36.5)
MCHC RBC AUTO-ENTMCNC: 31.9 G/DL (ref 31.5–36.5)
MCHC RBC AUTO-ENTMCNC: 31.9 G/DL (ref 31.5–36.5)
MCHC RBC AUTO-ENTMCNC: 32 G/DL (ref 31.5–36.5)
MCHC RBC AUTO-ENTMCNC: 32 G/DL (ref 31.5–36.5)
MCHC RBC AUTO-ENTMCNC: 32.1 G/DL (ref 31.5–36.5)
MCHC RBC AUTO-ENTMCNC: 32.1 G/DL (ref 31.5–36.5)
MCHC RBC AUTO-ENTMCNC: 32.2 G/DL (ref 31.5–36.5)
MCHC RBC AUTO-ENTMCNC: 32.2 G/DL (ref 31.5–36.5)
MCHC RBC AUTO-ENTMCNC: 32.3 G/DL (ref 31.5–36.5)
MCHC RBC AUTO-ENTMCNC: 32.3 G/DL (ref 31.5–36.5)
MCHC RBC AUTO-ENTMCNC: 32.5 G/DL (ref 31.5–36.5)
MCHC RBC AUTO-ENTMCNC: 32.7 G/DL (ref 31.5–36.5)
MCHC RBC AUTO-ENTMCNC: 32.9 G/DL (ref 31.5–36.5)
MCHC RBC AUTO-ENTMCNC: 34.1 G/DL (ref 31.5–36.5)
MCHC RBC AUTO-ENTMCNC: 34.1 G/DL (ref 31.5–36.5)
MCHC RBC AUTO-ENTMCNC: 34.3 G/DL (ref 31.5–36.5)
MCV RBC AUTO: 100 FL (ref 78–100)
MCV RBC AUTO: 101 FL (ref 78–100)
MCV RBC AUTO: 103 FL (ref 78–100)
MCV RBC AUTO: 104 FL (ref 78–100)
MCV RBC AUTO: 105 FL (ref 78–100)
MCV RBC AUTO: 107 FL (ref 78–100)
MCV RBC AUTO: 107 FL (ref 78–100)
MCV RBC AUTO: 108 FL (ref 78–100)
MCV RBC AUTO: 109 FL (ref 78–100)
MCV RBC AUTO: 110 FL (ref 78–100)
MCV RBC AUTO: 111 FL (ref 78–100)
MCV RBC AUTO: 95 FL (ref 78–100)
MCV RBC AUTO: 97 FL (ref 78–100)
MCV RBC AUTO: 98 FL (ref 78–100)
MCV RBC AUTO: 99 FL (ref 78–100)
METAMYELOCYTES # BLD MANUAL: 0 10E3/UL
METAMYELOCYTES # BLD MANUAL: 0 10E3/UL
METAMYELOCYTES # BLD MANUAL: 0.1 10E3/UL
METAMYELOCYTES # BLD MANUAL: 0.1 10E3/UL
METAMYELOCYTES NFR BLD MANUAL: 1 %
METAMYELOCYTES NFR BLD MANUAL: 3 %
MONOCYTES # BLD AUTO: 0.1 10E3/UL (ref 0–1.3)
MONOCYTES # BLD AUTO: 0.2 10E3/UL (ref 0–1.3)
MONOCYTES # BLD AUTO: 0.3 10E3/UL (ref 0–1.3)
MONOCYTES # BLD AUTO: 0.4 10E3/UL (ref 0–1.3)
MONOCYTES # BLD AUTO: 0.5 10E3/UL (ref 0–1.3)
MONOCYTES # BLD AUTO: 0.5 10E3/UL (ref 0–1.3)
MONOCYTES # BLD AUTO: 0.6 10E3/UL (ref 0–1.3)
MONOCYTES # BLD AUTO: 0.7 10E3/UL (ref 0–1.3)
MONOCYTES # BLD AUTO: 0.7 10E3/UL (ref 0–1.3)
MONOCYTES # BLD AUTO: ABNORMAL 10*3/UL
MONOCYTES # BLD MANUAL: 0.1 10E3/UL (ref 0–1.3)
MONOCYTES # BLD MANUAL: 0.2 10E3/UL (ref 0–1.3)
MONOCYTES # BLD MANUAL: 0.3 10E3/UL (ref 0–1.3)
MONOCYTES # BLD MANUAL: 0.3 10E3/UL (ref 0–1.3)
MONOCYTES # BLD MANUAL: 0.4 10E3/UL (ref 0–1.3)
MONOCYTES # BLD MANUAL: 0.5 10E3/UL (ref 0–1.3)
MONOCYTES # BLD MANUAL: 0.7 10E3/UL (ref 0–1.3)
MONOCYTES NFR BLD AUTO: 11 %
MONOCYTES NFR BLD AUTO: 11 %
MONOCYTES NFR BLD AUTO: 12 %
MONOCYTES NFR BLD AUTO: 12 %
MONOCYTES NFR BLD AUTO: 13 %
MONOCYTES NFR BLD AUTO: 13 %
MONOCYTES NFR BLD AUTO: 14 %
MONOCYTES NFR BLD AUTO: 15 %
MONOCYTES NFR BLD AUTO: 15 %
MONOCYTES NFR BLD AUTO: 16 %
MONOCYTES NFR BLD AUTO: 3 %
MONOCYTES NFR BLD AUTO: 3 %
MONOCYTES NFR BLD AUTO: 4 %
MONOCYTES NFR BLD AUTO: 5 %
MONOCYTES NFR BLD AUTO: 6 %
MONOCYTES NFR BLD AUTO: 6 %
MONOCYTES NFR BLD AUTO: 8 %
MONOCYTES NFR BLD AUTO: 9 %
MONOCYTES NFR BLD AUTO: 9 %
MONOCYTES NFR BLD AUTO: ABNORMAL %
MONOCYTES NFR BLD MANUAL: 11 %
MONOCYTES NFR BLD MANUAL: 12 %
MONOCYTES NFR BLD MANUAL: 12 %
MONOCYTES NFR BLD MANUAL: 13 %
MONOCYTES NFR BLD MANUAL: 14 %
MONOCYTES NFR BLD MANUAL: 15 %
MONOCYTES NFR BLD MANUAL: 17 %
MONOCYTES NFR BLD MANUAL: 4 %
MONOCYTES NFR BLD MANUAL: 5 %
MONOCYTES NFR BLD MANUAL: 6 %
MONOCYTES NFR BLD MANUAL: 7 %
MONOCYTES NFR BLD MANUAL: 8 %
MONOS+MACROS NFR FLD MANUAL: 20 %
MONOS+MACROS NFR FLD MANUAL: 81 %
MONOS+MACROS NFR FLD MANUAL: 91 %
MRSA DNA SPEC QL NAA+PROBE: NEGATIVE
MYELOCYTES # BLD MANUAL: 0 10E3/UL
MYELOCYTES # BLD MANUAL: 0.1 10E3/UL
MYELOCYTES # BLD MANUAL: 0.1 10E3/UL
MYELOCYTES NFR BLD MANUAL: 1 %
MYELOCYTES NFR BLD MANUAL: 2 %
MYELOCYTES NFR BLD MANUAL: 2 %
NEUTROPHILS # BLD AUTO: 1.3 10E3/UL (ref 1.6–8.3)
NEUTROPHILS # BLD AUTO: 1.3 10E3/UL (ref 1.6–8.3)
NEUTROPHILS # BLD AUTO: 1.5 10E3/UL (ref 1.6–8.3)
NEUTROPHILS # BLD AUTO: 1.5 10E3/UL (ref 1.6–8.3)
NEUTROPHILS # BLD AUTO: 1.6 10E3/UL (ref 1.6–8.3)
NEUTROPHILS # BLD AUTO: 1.6 10E3/UL (ref 1.6–8.3)
NEUTROPHILS # BLD AUTO: 1.7 10E3/UL (ref 1.6–8.3)
NEUTROPHILS # BLD AUTO: 1.8 10E3/UL (ref 1.6–8.3)
NEUTROPHILS # BLD AUTO: 1.8 10E3/UL (ref 1.6–8.3)
NEUTROPHILS # BLD AUTO: 1.9 10E3/UL (ref 1.6–8.3)
NEUTROPHILS # BLD AUTO: 2 10E3/UL (ref 1.6–8.3)
NEUTROPHILS # BLD AUTO: 2.1 10E3/UL (ref 1.6–8.3)
NEUTROPHILS # BLD AUTO: 2.7 10E3/UL (ref 1.6–8.3)
NEUTROPHILS # BLD AUTO: 2.9 10E3/UL (ref 1.6–8.3)
NEUTROPHILS # BLD AUTO: 2.9 10E3/UL (ref 1.6–8.3)
NEUTROPHILS # BLD AUTO: 3.3 10E3/UL (ref 1.6–8.3)
NEUTROPHILS # BLD AUTO: 4.7 10E3/UL (ref 1.6–8.3)
NEUTROPHILS # BLD AUTO: 4.9 10E3/UL (ref 1.6–8.3)
NEUTROPHILS # BLD AUTO: 4.9 10E3/UL (ref 1.6–8.3)
NEUTROPHILS # BLD AUTO: 5.1 10E3/UL (ref 1.6–8.3)
NEUTROPHILS # BLD AUTO: 5.9 10E3/UL (ref 1.6–8.3)
NEUTROPHILS # BLD AUTO: ABNORMAL 10*3/UL
NEUTROPHILS # BLD MANUAL: 0.2 10E3/UL (ref 1.6–8.3)
NEUTROPHILS # BLD MANUAL: 0.6 10E3/UL (ref 1.6–8.3)
NEUTROPHILS # BLD MANUAL: 1.3 10E3/UL (ref 1.6–8.3)
NEUTROPHILS # BLD MANUAL: 1.4 10E3/UL (ref 1.6–8.3)
NEUTROPHILS # BLD MANUAL: 1.5 10E3/UL (ref 1.6–8.3)
NEUTROPHILS # BLD MANUAL: 1.7 10E3/UL (ref 1.6–8.3)
NEUTROPHILS # BLD MANUAL: 1.7 10E3/UL (ref 1.6–8.3)
NEUTROPHILS # BLD MANUAL: 2.2 10E3/UL (ref 1.6–8.3)
NEUTROPHILS # BLD MANUAL: 2.8 10E3/UL (ref 1.6–8.3)
NEUTROPHILS # BLD MANUAL: 2.9 10E3/UL (ref 1.6–8.3)
NEUTROPHILS # BLD MANUAL: 3 10E3/UL (ref 1.6–8.3)
NEUTROPHILS # BLD MANUAL: 3.2 10E3/UL (ref 1.6–8.3)
NEUTROPHILS # BLD MANUAL: 3.3 10E3/UL (ref 1.6–8.3)
NEUTROPHILS # BLD MANUAL: 4.2 10E3/UL (ref 1.6–8.3)
NEUTROPHILS # BLD MANUAL: 4.6 10E3/UL (ref 1.6–8.3)
NEUTROPHILS # BLD MANUAL: 4.8 10E3/UL (ref 1.6–8.3)
NEUTROPHILS # BLD MANUAL: 5.1 10E3/UL (ref 1.6–8.3)
NEUTROPHILS # BLD MANUAL: 5.2 10E3/UL (ref 1.6–8.3)
NEUTROPHILS # BLD MANUAL: 5.3 10E3/UL (ref 1.6–8.3)
NEUTROPHILS NFR BLD AUTO: 52 %
NEUTROPHILS NFR BLD AUTO: 56 %
NEUTROPHILS NFR BLD AUTO: 56 %
NEUTROPHILS NFR BLD AUTO: 57 %
NEUTROPHILS NFR BLD AUTO: 58 %
NEUTROPHILS NFR BLD AUTO: 59 %
NEUTROPHILS NFR BLD AUTO: 62 %
NEUTROPHILS NFR BLD AUTO: 62 %
NEUTROPHILS NFR BLD AUTO: 63 %
NEUTROPHILS NFR BLD AUTO: 65 %
NEUTROPHILS NFR BLD AUTO: 68 %
NEUTROPHILS NFR BLD AUTO: 71 %
NEUTROPHILS NFR BLD AUTO: 72 %
NEUTROPHILS NFR BLD AUTO: 72 %
NEUTROPHILS NFR BLD AUTO: 73 %
NEUTROPHILS NFR BLD AUTO: 74 %
NEUTROPHILS NFR BLD AUTO: 75 %
NEUTROPHILS NFR BLD AUTO: 76 %
NEUTROPHILS NFR BLD AUTO: 77 %
NEUTROPHILS NFR BLD AUTO: 84 %
NEUTROPHILS NFR BLD AUTO: ABNORMAL %
NEUTROPHILS NFR BLD MANUAL: 20 %
NEUTROPHILS NFR BLD MANUAL: 48 %
NEUTROPHILS NFR BLD MANUAL: 49 %
NEUTROPHILS NFR BLD MANUAL: 50 %
NEUTROPHILS NFR BLD MANUAL: 51 %
NEUTROPHILS NFR BLD MANUAL: 57 %
NEUTROPHILS NFR BLD MANUAL: 59 %
NEUTROPHILS NFR BLD MANUAL: 61 %
NEUTROPHILS NFR BLD MANUAL: 64 %
NEUTROPHILS NFR BLD MANUAL: 70 %
NEUTROPHILS NFR BLD MANUAL: 75 %
NEUTROPHILS NFR BLD MANUAL: 75 %
NEUTROPHILS NFR BLD MANUAL: 78 %
NEUTROPHILS NFR BLD MANUAL: 85 %
NEUTROPHILS NFR BLD MANUAL: 86 %
NEUTROPHILS NFR BLD MANUAL: 88 %
NEUTROPHILS NFR BLD MANUAL: 89 %
NEUTS BAND NFR FLD MANUAL: 5 %
NEUTS BAND NFR FLD MANUAL: 78 %
NEUTS BAND NFR FLD MANUAL: 8 %
NEUTS HYPERSEG BLD QL SMEAR: NORMAL
NITRATE UR QL: NEGATIVE
NRBC # BLD AUTO: 0 10E3/UL
NRBC # BLD AUTO: 0.1 10E3/UL
NRBC BLD AUTO-RTO: 0 /100
NRBC BLD AUTO-RTO: 1 /100
NRBC BLD AUTO-RTO: 1 /100
NRBC BLD MANUAL-RTO: 1 %
O2/TOTAL GAS SETTING VFR VENT: 2 %
O2/TOTAL GAS SETTING VFR VENT: 30 %
O2/TOTAL GAS SETTING VFR VENT: 40 %
O2/TOTAL GAS SETTING VFR VENT: 44 %
O2/TOTAL GAS SETTING VFR VENT: 45 %
O2/TOTAL GAS SETTING VFR VENT: 50 %
O2/TOTAL GAS SETTING VFR VENT: 60 %
OBSERVATION IMP: NEGATIVE
OBSERVATION IMP: NEGATIVE
ORGAN: NORMAL
ORGAN: NORMAL
OXYHGB MFR BLDV: 39 % (ref 70–75)
OXYHGB MFR BLDV: 40 % (ref 70–75)
OXYHGB MFR BLDV: 49 % (ref 70–75)
OXYHGB MFR BLDV: 52 % (ref 70–75)
OXYHGB MFR BLDV: 61 % (ref 70–75)
OXYHGB MFR BLDV: 62 % (ref 70–75)
OXYHGB MFR BLDV: 62 % (ref 70–75)
OXYHGB MFR BLDV: 74 % (ref 70–75)
OXYHGB MFR BLDV: 76 % (ref 70–75)
OXYHGB MFR BLDV: 82 % (ref 70–75)
OXYHGB MFR BLDV: 83 % (ref 70–75)
OXYHGB MFR BLDV: 83 % (ref 70–75)
OXYHGB MFR BLDV: 86 % (ref 70–75)
OXYHGB MFR BLDV: 86 % (ref 70–75)
OXYHGB MFR BLDV: 88 % (ref 70–75)
OXYHGB MFR BLDV: 98 % (ref 70–75)
P AXIS - MUSE: 108 DEGREES
P AXIS - MUSE: 23 DEGREES
P AXIS - MUSE: 25 DEGREES
P AXIS - MUSE: 27 DEGREES
P AXIS - MUSE: 27 DEGREES
P AXIS - MUSE: 34 DEGREES
P AXIS - MUSE: 35 DEGREES
P AXIS - MUSE: 41 DEGREES
P AXIS - MUSE: 45 DEGREES
P AXIS - MUSE: 45 DEGREES
P AXIS - MUSE: 47 DEGREES
P AXIS - MUSE: 50 DEGREES
P AXIS - MUSE: 50 DEGREES
P AXIS - MUSE: 54 DEGREES
P AXIS - MUSE: 55 DEGREES
P AXIS - MUSE: 55 DEGREES
P AXIS - MUSE: 56 DEGREES
P AXIS - MUSE: 59 DEGREES
P AXIS - MUSE: 60 DEGREES
P AXIS - MUSE: 61 DEGREES
P AXIS - MUSE: 61 DEGREES
P AXIS - MUSE: 62 DEGREES
P AXIS - MUSE: 65 DEGREES
P AXIS - MUSE: 66 DEGREES
P AXIS - MUSE: 66 DEGREES
P AXIS - MUSE: 7 DEGREES
P AXIS - MUSE: 70 DEGREES
P AXIS - MUSE: 70 DEGREES
P AXIS - MUSE: 72 DEGREES
P AXIS - MUSE: 72 DEGREES
P AXIS - MUSE: 74 DEGREES
P AXIS - MUSE: 74 DEGREES
P AXIS - MUSE: 77 DEGREES
P AXIS - MUSE: 78 DEGREES
P AXIS - MUSE: 80 DEGREES
P AXIS - MUSE: 80 DEGREES
P AXIS - MUSE: 81 DEGREES
P AXIS - MUSE: 82 DEGREES
P AXIS - MUSE: 86 DEGREES
P AXIS - MUSE: 9 DEGREES
P AXIS - MUSE: 90 DEGREES
P AXIS - MUSE: 99 DEGREES
P AXIS - MUSE: NORMAL DEGREES
P JIROVECII DNA SPEC QL NAA+PROBE: NOT DETECTED
P JIROVECII DNA SPEC QL NAA+PROBE: NOT DETECTED
PATH REPORT.COMMENTS IMP SPEC: NORMAL
PATH REPORT.FINAL DX SPEC: NORMAL
PATH REPORT.GROSS SPEC: NORMAL
PATH REPORT.GROSS SPEC: NORMAL
PATH REPORT.MICROSCOPIC SPEC OTHER STN: NORMAL
PATH REPORT.RELEVANT HX SPEC: NORMAL
PCO2 BLD: 41 MM HG (ref 35–45)
PCO2 BLD: 41 MM HG (ref 35–45)
PCO2 BLD: 43 MM HG (ref 35–45)
PCO2 BLD: 44 MM HG (ref 35–45)
PCO2 BLD: 44 MM HG (ref 35–45)
PCO2 BLD: 45 MM HG (ref 35–45)
PCO2 BLD: 46 MM HG (ref 35–45)
PCO2 BLD: 46 MM HG (ref 35–45)
PCO2 BLD: 47 MM HG (ref 35–45)
PCO2 BLD: 47 MM HG (ref 35–45)
PCO2 BLD: 50 MM HG (ref 35–45)
PCO2 BLD: 50 MM HG (ref 35–45)
PCO2 BLD: 51 MM HG (ref 35–45)
PCO2 BLD: 53 MM HG (ref 35–45)
PCO2 BLDV: 37 MM HG (ref 40–50)
PCO2 BLDV: 43 MM HG (ref 40–50)
PCO2 BLDV: 43 MM HG (ref 40–50)
PCO2 BLDV: 45 MM HG (ref 40–50)
PCO2 BLDV: 48 MM HG (ref 40–50)
PCO2 BLDV: 52 MM HG (ref 40–50)
PCO2 BLDV: 53 MM HG (ref 40–50)
PCO2 BLDV: 53 MM HG (ref 40–50)
PCO2 BLDV: 55 MM HG (ref 40–50)
PCO2 BLDV: 55 MM HG (ref 40–50)
PCO2 BLDV: 56 MM HG (ref 40–50)
PCO2 BLDV: 59 MM HG (ref 40–50)
PCO2 BLDV: 64 MM HG (ref 40–50)
PCO2 BLDV: 72 MM HG (ref 40–50)
PCO2 BLDV: 81 MM HG (ref 40–50)
PCO2 BLDV: 85 MM HG (ref 40–50)
PCO2 BLDV: 88 MM HG (ref 40–50)
PCO2 BLDV: 95 MM HG (ref 40–50)
PEEP: 7 CM H2O
PEEP: 8 CM H2O
PH BLD: 7.27 [PH] (ref 7.35–7.45)
PH BLD: 7.28 [PH] (ref 7.35–7.45)
PH BLD: 7.29 [PH] (ref 7.35–7.45)
PH BLD: 7.3 [PH] (ref 7.35–7.45)
PH BLD: 7.32 [PH] (ref 7.35–7.45)
PH BLD: 7.33 [PH] (ref 7.35–7.45)
PH BLD: 7.35 [PH] (ref 7.35–7.45)
PH BLD: 7.37 [PH] (ref 7.35–7.45)
PH BLD: 7.37 [PH] (ref 7.35–7.45)
PH BLD: 7.38 [PH] (ref 7.35–7.45)
PH BLDV: 7.11 [PH] (ref 7.32–7.43)
PH BLDV: 7.15 [PH] (ref 7.32–7.43)
PH BLDV: 7.17 [PH] (ref 7.32–7.43)
PH BLDV: 7.18 [PH] (ref 7.32–7.43)
PH BLDV: 7.26 [PH] (ref 7.32–7.43)
PH BLDV: 7.28 [PH] (ref 7.32–7.43)
PH BLDV: 7.28 [PH] (ref 7.32–7.43)
PH BLDV: 7.29 [PH] (ref 7.32–7.43)
PH BLDV: 7.32 [PH] (ref 7.32–7.43)
PH BLDV: 7.33 [PH] (ref 7.32–7.43)
PH BLDV: 7.35 [PH] (ref 7.32–7.43)
PH BLDV: 7.35 [PH] (ref 7.32–7.43)
PH BLDV: 7.39 [PH] (ref 7.32–7.43)
PH BLDV: 7.4 [PH] (ref 7.32–7.43)
PH BLDV: 7.4 [PH] (ref 7.32–7.43)
PH BLDV: 7.41 [PH] (ref 7.32–7.43)
PH BLDV: 7.41 [PH] (ref 7.32–7.43)
PH BLDV: 7.45 [PH] (ref 7.32–7.43)
PH UR STRIP: 6.5 [PH] (ref 5–7)
PHOSPHATE SERPL-MCNC: 1.9 MG/DL (ref 2.5–4.5)
PHOSPHATE SERPL-MCNC: 2.4 MG/DL (ref 2.5–4.5)
PHOSPHATE SERPL-MCNC: 2.5 MG/DL (ref 2.5–4.5)
PHOSPHATE SERPL-MCNC: 2.6 MG/DL (ref 2.5–4.5)
PHOSPHATE SERPL-MCNC: 2.7 MG/DL (ref 2.5–4.5)
PHOSPHATE SERPL-MCNC: 3 MG/DL (ref 2.5–4.5)
PHOSPHATE SERPL-MCNC: 3 MG/DL (ref 2.5–4.5)
PHOSPHATE SERPL-MCNC: 3.1 MG/DL (ref 2.5–4.5)
PHOSPHATE SERPL-MCNC: 3.3 MG/DL (ref 2.5–4.5)
PHOSPHATE SERPL-MCNC: 3.4 MG/DL (ref 2.5–4.5)
PHOSPHATE SERPL-MCNC: 3.4 MG/DL (ref 2.5–4.5)
PHOSPHATE SERPL-MCNC: 3.5 MG/DL (ref 2.5–4.5)
PHOSPHATE SERPL-MCNC: 3.6 MG/DL (ref 2.5–4.5)
PHOSPHATE SERPL-MCNC: 3.9 MG/DL (ref 2.5–4.5)
PHOSPHATE SERPL-MCNC: 4 MG/DL (ref 2.5–4.5)
PHOSPHATE SERPL-MCNC: 4 MG/DL (ref 2.5–4.5)
PHOSPHATE SERPL-MCNC: 4.1 MG/DL (ref 2.5–4.5)
PHOSPHATE SERPL-MCNC: 4.2 MG/DL (ref 2.5–4.5)
PHOSPHATE SERPL-MCNC: 4.2 MG/DL (ref 2.5–4.5)
PHOSPHATE SERPL-MCNC: 4.3 MG/DL (ref 2.5–4.5)
PHOSPHATE SERPL-MCNC: 4.4 MG/DL (ref 2.5–4.5)
PHOSPHATE SERPL-MCNC: 4.5 MG/DL (ref 2.5–4.5)
PHOSPHATE SERPL-MCNC: 4.6 MG/DL (ref 2.5–4.5)
PHOSPHATE SERPL-MCNC: 4.7 MG/DL (ref 2.5–4.5)
PHOSPHATE SERPL-MCNC: 4.7 MG/DL (ref 2.5–4.5)
PHOSPHATE SERPL-MCNC: 4.8 MG/DL (ref 2.5–4.5)
PHOSPHATE SERPL-MCNC: 5 MG/DL (ref 2.5–4.5)
PHOSPHATE SERPL-MCNC: 5 MG/DL (ref 2.5–4.5)
PHOSPHATE SERPL-MCNC: 5.1 MG/DL (ref 2.5–4.5)
PHOSPHATE SERPL-MCNC: 5.3 MG/DL (ref 2.5–4.5)
PHOSPHATE SERPL-MCNC: 6 MG/DL (ref 2.5–4.5)
PHOSPHATE SERPL-MCNC: 6.1 MG/DL (ref 2.5–4.5)
PHOSPHATE SERPL-MCNC: 7 MG/DL (ref 2.5–4.5)
PLAT MORPH BLD: ABNORMAL
PLAT MORPH BLD: NORMAL
PLATELET # BLD AUTO: 100 10E3/UL (ref 150–450)
PLATELET # BLD AUTO: 102 10E3/UL (ref 150–450)
PLATELET # BLD AUTO: 106 10E3/UL (ref 150–450)
PLATELET # BLD AUTO: 109 10E3/UL (ref 150–450)
PLATELET # BLD AUTO: 111 10E3/UL (ref 150–450)
PLATELET # BLD AUTO: 112 10E3/UL (ref 150–450)
PLATELET # BLD AUTO: 123 10E3/UL (ref 150–450)
PLATELET # BLD AUTO: 126 10E3/UL (ref 150–450)
PLATELET # BLD AUTO: 131 10E3/UL (ref 150–450)
PLATELET # BLD AUTO: 137 10E3/UL (ref 150–450)
PLATELET # BLD AUTO: 140 10E3/UL (ref 150–450)
PLATELET # BLD AUTO: 142 10E3/UL (ref 150–450)
PLATELET # BLD AUTO: 147 10E3/UL (ref 150–450)
PLATELET # BLD AUTO: 147 10E3/UL (ref 150–450)
PLATELET # BLD AUTO: 150 10E3/UL (ref 150–450)
PLATELET # BLD AUTO: 152 10E3/UL (ref 150–450)
PLATELET # BLD AUTO: 153 10E3/UL (ref 150–450)
PLATELET # BLD AUTO: 153 10E3/UL (ref 150–450)
PLATELET # BLD AUTO: 157 10E3/UL (ref 150–450)
PLATELET # BLD AUTO: 157 10E3/UL (ref 150–450)
PLATELET # BLD AUTO: 165 10E3/UL (ref 150–450)
PLATELET # BLD AUTO: 167 10E3/UL (ref 150–450)
PLATELET # BLD AUTO: 177 10E3/UL (ref 150–450)
PLATELET # BLD AUTO: 183 10E3/UL (ref 150–450)
PLATELET # BLD AUTO: 187 10E3/UL (ref 150–450)
PLATELET # BLD AUTO: 188 10E3/UL (ref 150–450)
PLATELET # BLD AUTO: 202 10E3/UL (ref 150–450)
PLATELET # BLD AUTO: 216 10E3/UL (ref 150–450)
PLATELET # BLD AUTO: 45 10E3/UL (ref 150–450)
PLATELET # BLD AUTO: 47 10E3/UL (ref 150–450)
PLATELET # BLD AUTO: 48 10E3/UL (ref 150–450)
PLATELET # BLD AUTO: 50 10E3/UL (ref 150–450)
PLATELET # BLD AUTO: 52 10E3/UL (ref 150–450)
PLATELET # BLD AUTO: 56 10E3/UL (ref 150–450)
PLATELET # BLD AUTO: 63 10E3/UL (ref 150–450)
PLATELET # BLD AUTO: 69 10E3/UL (ref 150–450)
PLATELET # BLD AUTO: 70 10E3/UL (ref 150–450)
PLATELET # BLD AUTO: 70 10E3/UL (ref 150–450)
PLATELET # BLD AUTO: 75 10E3/UL (ref 150–450)
PLATELET # BLD AUTO: 84 10E3/UL (ref 150–450)
PLATELET # BLD AUTO: 84 10E3/UL (ref 150–450)
PLATELET # BLD AUTO: 86 10E3/UL (ref 150–450)
PLATELET # BLD AUTO: 89 10E3/UL (ref 150–450)
PLATELET # BLD AUTO: 94 10E3/UL (ref 150–450)
PLATELET # BLD AUTO: 94 10E3/UL (ref 150–450)
PLATELET # BLD AUTO: 95 10E3/UL (ref 150–450)
PLATELET # BLD AUTO: 96 10E3/UL (ref 150–450)
PLATELET # BLD AUTO: 97 10E3/UL (ref 150–450)
PLATELET # BLD AUTO: 98 10E3/UL (ref 150–450)
PLATELET # BLD AUTO: 98 10E3/UL (ref 150–450)
PO2 BLD: 103 MM HG (ref 80–105)
PO2 BLD: 123 MM HG (ref 80–105)
PO2 BLD: 133 MM HG (ref 80–105)
PO2 BLD: 137 MM HG (ref 80–105)
PO2 BLD: 154 MM HG (ref 80–105)
PO2 BLD: 60 MM HG (ref 80–105)
PO2 BLD: 63 MM HG (ref 80–105)
PO2 BLD: 66 MM HG (ref 80–105)
PO2 BLD: 68 MM HG (ref 80–105)
PO2 BLD: 71 MM HG (ref 80–105)
PO2 BLD: 75 MM HG (ref 80–105)
PO2 BLD: 79 MM HG (ref 80–105)
PO2 BLD: 80 MM HG (ref 80–105)
PO2 BLD: 81 MM HG (ref 80–105)
PO2 BLDV: 141 MM HG (ref 25–47)
PO2 BLDV: 26 MM HG (ref 25–47)
PO2 BLDV: 28 MM HG (ref 25–47)
PO2 BLDV: 29 MM HG (ref 25–47)
PO2 BLDV: 30 MM HG (ref 25–47)
PO2 BLDV: 33 MM HG (ref 25–47)
PO2 BLDV: 35 MM HG (ref 25–47)
PO2 BLDV: 35 MM HG (ref 25–47)
PO2 BLDV: 38 MM HG (ref 25–47)
PO2 BLDV: 40 MM HG (ref 25–47)
PO2 BLDV: 41 MM HG (ref 25–47)
PO2 BLDV: 41 MM HG (ref 25–47)
PO2 BLDV: 43 MM HG (ref 25–47)
PO2 BLDV: 43 MM HG (ref 25–47)
PO2 BLDV: 46 MM HG (ref 25–47)
PO2 BLDV: 48 MM HG (ref 25–47)
PO2 BLDV: 51 MM HG (ref 25–47)
PO2 BLDV: 59 MM HG (ref 25–47)
POLYCHROMASIA BLD QL SMEAR: ABNORMAL
POLYCHROMASIA BLD QL SMEAR: NORMAL
POLYCHROMASIA BLD QL SMEAR: SLIGHT
POSACONAZOLE SERPL-MCNC: 0.8 UG/ML (ref 0.7–5)
POSACONAZOLE SERPL-MCNC: 2.5 UG/ML (ref 0.7–5)
POTASSIUM BLD-SCNC: 3.3 MMOL/L (ref 3.4–5.3)
POTASSIUM BLD-SCNC: 3.6 MMOL/L (ref 3.4–5.3)
POTASSIUM BLD-SCNC: 3.6 MMOL/L (ref 3.4–5.3)
POTASSIUM BLD-SCNC: 4.5 MMOL/L (ref 3.4–5.3)
POTASSIUM BLD-SCNC: 4.7 MMOL/L (ref 3.4–5.3)
POTASSIUM SERPL-SCNC: 3.1 MMOL/L (ref 3.4–5.3)
POTASSIUM SERPL-SCNC: 3.2 MMOL/L (ref 3.4–5.3)
POTASSIUM SERPL-SCNC: 3.2 MMOL/L (ref 3.4–5.3)
POTASSIUM SERPL-SCNC: 3.3 MMOL/L (ref 3.4–5.3)
POTASSIUM SERPL-SCNC: 3.4 MMOL/L (ref 3.4–5.3)
POTASSIUM SERPL-SCNC: 3.5 MMOL/L (ref 3.4–5.3)
POTASSIUM SERPL-SCNC: 3.6 MMOL/L (ref 3.4–5.3)
POTASSIUM SERPL-SCNC: 3.7 MMOL/L (ref 3.4–5.3)
POTASSIUM SERPL-SCNC: 3.8 MMOL/L (ref 3.4–5.3)
POTASSIUM SERPL-SCNC: 3.9 MMOL/L (ref 3.4–5.3)
POTASSIUM SERPL-SCNC: 4 MMOL/L (ref 3.4–5.3)
POTASSIUM SERPL-SCNC: 4.1 MMOL/L (ref 3.4–5.3)
POTASSIUM SERPL-SCNC: 4.2 MMOL/L (ref 3.4–5.3)
POTASSIUM SERPL-SCNC: 4.2 MMOL/L (ref 3.4–5.3)
POTASSIUM SERPL-SCNC: 4.3 MMOL/L (ref 3.4–5.3)
POTASSIUM SERPL-SCNC: 4.4 MMOL/L (ref 3.4–5.3)
POTASSIUM SERPL-SCNC: 4.5 MMOL/L (ref 3.4–5.3)
POTASSIUM SERPL-SCNC: 4.6 MMOL/L (ref 3.4–5.3)
PR INTERVAL - MUSE: 126 MS
PR INTERVAL - MUSE: 128 MS
PR INTERVAL - MUSE: 132 MS
PR INTERVAL - MUSE: 136 MS
PR INTERVAL - MUSE: 144 MS
PR INTERVAL - MUSE: 144 MS
PR INTERVAL - MUSE: 148 MS
PR INTERVAL - MUSE: 152 MS
PR INTERVAL - MUSE: 152 MS
PR INTERVAL - MUSE: 154 MS
PR INTERVAL - MUSE: 156 MS
PR INTERVAL - MUSE: 160 MS
PR INTERVAL - MUSE: 162 MS
PR INTERVAL - MUSE: 164 MS
PR INTERVAL - MUSE: 164 MS
PR INTERVAL - MUSE: 166 MS
PR INTERVAL - MUSE: 168 MS
PR INTERVAL - MUSE: 170 MS
PR INTERVAL - MUSE: 170 MS
PR INTERVAL - MUSE: 172 MS
PR INTERVAL - MUSE: 174 MS
PR INTERVAL - MUSE: 176 MS
PR INTERVAL - MUSE: 178 MS
PR INTERVAL - MUSE: 182 MS
PR INTERVAL - MUSE: 184 MS
PR INTERVAL - MUSE: 184 MS
PR INTERVAL - MUSE: 186 MS
PR INTERVAL - MUSE: 192 MS
PR INTERVAL - MUSE: NORMAL MS
PROCALCITONIN SERPL IA-MCNC: 0.23 NG/ML
PROCALCITONIN SERPL IA-MCNC: 0.24 NG/ML
PROCALCITONIN SERPL IA-MCNC: 0.97 NG/ML
PROCALCITONIN SERPL IA-MCNC: 1.07 NG/ML
PROCALCITONIN SERPL IA-MCNC: 1.16 NG/ML
PROCALCITONIN SERPL IA-MCNC: 1.77 NG/ML
PROMYELOCYTES # BLD MANUAL: 0.1 10E3/UL
PROMYELOCYTES NFR BLD MANUAL: 2 %
PROSPERA TRANSPLANT MONITORING: 2.26 %
PROSPERA TRANSPLANT MONITORING: 2.3 %
PROSPERA TRANSPLANT MONITORING: 2.92 %
PROT SERPL-MCNC: 4.3 G/DL (ref 6.4–8.3)
PROT SERPL-MCNC: 4.6 G/DL (ref 6.4–8.3)
PROT SERPL-MCNC: 4.7 G/DL (ref 6.4–8.3)
PROT SERPL-MCNC: 4.9 G/DL (ref 6.4–8.3)
PROT SERPL-MCNC: 5 G/DL (ref 6.4–8.3)
PROT SERPL-MCNC: 5 G/DL (ref 6.4–8.3)
PROT SERPL-MCNC: 5.1 G/DL (ref 6.4–8.3)
PROT SERPL-MCNC: 5.3 G/DL (ref 6.4–8.3)
PROT SERPL-MCNC: 5.4 G/DL (ref 6.4–8.3)
PROT SERPL-MCNC: 6.3 G/DL (ref 6.4–8.3)
PROT SERPL-MCNC: 6.3 G/DL (ref 6.4–8.3)
PROT SERPL-MCNC: 6.5 G/DL (ref 6.4–8.3)
PROTOCOL CUTOFF: NORMAL
PROTOCOL CUTOFF: NORMAL
PTH-INTACT SERPL-MCNC: 91 PG/ML (ref 15–65)
QRS DURATION - MUSE: 114 MS
QRS DURATION - MUSE: 116 MS
QRS DURATION - MUSE: 118 MS
QRS DURATION - MUSE: 120 MS
QRS DURATION - MUSE: 122 MS
QRS DURATION - MUSE: 124 MS
QRS DURATION - MUSE: 126 MS
QRS DURATION - MUSE: 130 MS
QRS DURATION - MUSE: 132 MS
QRS DURATION - MUSE: 138 MS
QT - MUSE: 282 MS
QT - MUSE: 308 MS
QT - MUSE: 320 MS
QT - MUSE: 332 MS
QT - MUSE: 334 MS
QT - MUSE: 334 MS
QT - MUSE: 340 MS
QT - MUSE: 342 MS
QT - MUSE: 350 MS
QT - MUSE: 360 MS
QT - MUSE: 364 MS
QT - MUSE: 364 MS
QT - MUSE: 370 MS
QT - MUSE: 370 MS
QT - MUSE: 372 MS
QT - MUSE: 372 MS
QT - MUSE: 374 MS
QT - MUSE: 380 MS
QT - MUSE: 380 MS
QT - MUSE: 396 MS
QT - MUSE: 396 MS
QT - MUSE: 406 MS
QT - MUSE: 408 MS
QT - MUSE: 410 MS
QT - MUSE: 412 MS
QT - MUSE: 412 MS
QT - MUSE: 414 MS
QT - MUSE: 416 MS
QT - MUSE: 418 MS
QT - MUSE: 420 MS
QT - MUSE: 422 MS
QT - MUSE: 424 MS
QT - MUSE: 430 MS
QT - MUSE: 432 MS
QT - MUSE: 434 MS
QT - MUSE: 434 MS
QT - MUSE: 436 MS
QT - MUSE: 438 MS
QT - MUSE: 448 MS
QT - MUSE: 450 MS
QTC - MUSE: 374 MS
QTC - MUSE: 413 MS
QTC - MUSE: 426 MS
QTC - MUSE: 428 MS
QTC - MUSE: 430 MS
QTC - MUSE: 434 MS
QTC - MUSE: 438 MS
QTC - MUSE: 438 MS
QTC - MUSE: 442 MS
QTC - MUSE: 444 MS
QTC - MUSE: 445 MS
QTC - MUSE: 448 MS
QTC - MUSE: 450 MS
QTC - MUSE: 454 MS
QTC - MUSE: 454 MS
QTC - MUSE: 457 MS
QTC - MUSE: 462 MS
QTC - MUSE: 463 MS
QTC - MUSE: 471 MS
QTC - MUSE: 474 MS
QTC - MUSE: 475 MS
QTC - MUSE: 476 MS
QTC - MUSE: 476 MS
QTC - MUSE: 477 MS
QTC - MUSE: 478 MS
QTC - MUSE: 479 MS
QTC - MUSE: 480 MS
QTC - MUSE: 482 MS
QTC - MUSE: 483 MS
QTC - MUSE: 486 MS
QTC - MUSE: 486 MS
QTC - MUSE: 487 MS
QTC - MUSE: 488 MS
QTC - MUSE: 491 MS
QTC - MUSE: 495 MS
QTC - MUSE: 497 MS
QTC - MUSE: 499 MS
QTC - MUSE: 502 MS
QTC - MUSE: 504 MS
QTC - MUSE: 504 MS
QTC - MUSE: 506 MS
QTC - MUSE: 522 MS
QTC - MUSE: 523 MS
QTC - MUSE: 542 MS
R AXIS - MUSE: -10 DEGREES
R AXIS - MUSE: -11 DEGREES
R AXIS - MUSE: -12 DEGREES
R AXIS - MUSE: -12 DEGREES
R AXIS - MUSE: -13 DEGREES
R AXIS - MUSE: -13 DEGREES
R AXIS - MUSE: -15 DEGREES
R AXIS - MUSE: -18 DEGREES
R AXIS - MUSE: -2 DEGREES
R AXIS - MUSE: -20 DEGREES
R AXIS - MUSE: -21 DEGREES
R AXIS - MUSE: -22 DEGREES
R AXIS - MUSE: -22 DEGREES
R AXIS - MUSE: -23 DEGREES
R AXIS - MUSE: -25 DEGREES
R AXIS - MUSE: -26 DEGREES
R AXIS - MUSE: -27 DEGREES
R AXIS - MUSE: -28 DEGREES
R AXIS - MUSE: -3 DEGREES
R AXIS - MUSE: -3 DEGREES
R AXIS - MUSE: -30 DEGREES
R AXIS - MUSE: -40 DEGREES
R AXIS - MUSE: -5 DEGREES
R AXIS - MUSE: -6 DEGREES
R AXIS - MUSE: -6 DEGREES
R AXIS - MUSE: -7 DEGREES
R AXIS - MUSE: -8 DEGREES
R AXIS - MUSE: -9 DEGREES
RBC # BLD AUTO: 2 10E6/UL (ref 3.8–5.2)
RBC # BLD AUTO: 2.05 10E6/UL (ref 3.8–5.2)
RBC # BLD AUTO: 2.17 10E6/UL (ref 3.8–5.2)
RBC # BLD AUTO: 2.18 10E6/UL (ref 3.8–5.2)
RBC # BLD AUTO: 2.21 10E6/UL (ref 3.8–5.2)
RBC # BLD AUTO: 2.23 10E6/UL (ref 3.8–5.2)
RBC # BLD AUTO: 2.23 10E6/UL (ref 3.8–5.2)
RBC # BLD AUTO: 2.26 10E6/UL (ref 3.8–5.2)
RBC # BLD AUTO: 2.26 10E6/UL (ref 3.8–5.2)
RBC # BLD AUTO: 2.27 10E6/UL (ref 3.8–5.2)
RBC # BLD AUTO: 2.28 10E6/UL (ref 3.8–5.2)
RBC # BLD AUTO: 2.28 10E6/UL (ref 3.8–5.2)
RBC # BLD AUTO: 2.31 10E6/UL (ref 3.8–5.2)
RBC # BLD AUTO: 2.32 10E6/UL (ref 3.8–5.2)
RBC # BLD AUTO: 2.32 10E6/UL (ref 3.8–5.2)
RBC # BLD AUTO: 2.34 10E6/UL (ref 3.8–5.2)
RBC # BLD AUTO: 2.34 10E6/UL (ref 3.8–5.2)
RBC # BLD AUTO: 2.35 10E6/UL (ref 3.8–5.2)
RBC # BLD AUTO: 2.36 10E6/UL (ref 3.8–5.2)
RBC # BLD AUTO: 2.36 10E6/UL (ref 3.8–5.2)
RBC # BLD AUTO: 2.38 10E6/UL (ref 3.8–5.2)
RBC # BLD AUTO: 2.4 10E6/UL (ref 3.8–5.2)
RBC # BLD AUTO: 2.41 10E6/UL (ref 3.8–5.2)
RBC # BLD AUTO: 2.42 10E6/UL (ref 3.8–5.2)
RBC # BLD AUTO: 2.45 10E6/UL (ref 3.8–5.2)
RBC # BLD AUTO: 2.49 10E6/UL (ref 3.8–5.2)
RBC # BLD AUTO: 2.58 10E6/UL (ref 3.8–5.2)
RBC # BLD AUTO: 2.58 10E6/UL (ref 3.8–5.2)
RBC # BLD AUTO: 2.59 10E6/UL (ref 3.8–5.2)
RBC # BLD AUTO: 2.62 10E6/UL (ref 3.8–5.2)
RBC # BLD AUTO: 2.64 10E6/UL (ref 3.8–5.2)
RBC # BLD AUTO: 2.65 10E6/UL (ref 3.8–5.2)
RBC # BLD AUTO: 2.76 10E6/UL (ref 3.8–5.2)
RBC # BLD AUTO: 2.76 10E6/UL (ref 3.8–5.2)
RBC # BLD AUTO: 2.78 10E6/UL (ref 3.8–5.2)
RBC # BLD AUTO: 2.82 10E6/UL (ref 3.8–5.2)
RBC # BLD AUTO: 2.9 10E6/UL (ref 3.8–5.2)
RBC # BLD AUTO: 2.94 10E6/UL (ref 3.8–5.2)
RBC # BLD AUTO: 2.95 10E6/UL (ref 3.8–5.2)
RBC # BLD AUTO: 3.04 10E6/UL (ref 3.8–5.2)
RBC # BLD AUTO: 3.1 10E6/UL (ref 3.8–5.2)
RBC # BLD AUTO: 3.21 10E6/UL (ref 3.8–5.2)
RBC # BLD AUTO: 3.32 10E6/UL (ref 3.8–5.2)
RBC # BLD AUTO: 3.32 10E6/UL (ref 3.8–5.2)
RBC # BLD AUTO: 3.56 10E6/UL (ref 3.8–5.2)
RBC # BLD AUTO: 3.59 10E6/UL (ref 3.8–5.2)
RBC # BLD AUTO: 3.85 10E6/UL (ref 3.8–5.2)
RBC # BLD AUTO: 3.85 10E6/UL (ref 3.8–5.2)
RBC AGGLUT BLD QL: NORMAL
RBC MORPH BLD: ABNORMAL
RBC MORPH BLD: NORMAL
RBC URINE: 35 /HPF
RETICS # AUTO: 0.04 10E6/UL (ref 0.03–0.1)
RETICS # AUTO: 0.06 10E6/UL (ref 0.03–0.1)
RETICS # AUTO: 0.11 10E6/UL (ref 0.03–0.1)
RETICS/RBC NFR AUTO: 1.9 % (ref 0.5–2)
RETICS/RBC NFR AUTO: 2.1 % (ref 0.5–2)
RETICS/RBC NFR AUTO: 3.1 % (ref 0.5–2)
ROULEAUX BLD QL SMEAR: NORMAL
RSV RNA SPEC NAA+PROBE: NEGATIVE
RSV RNA SPEC NAA+PROBE: NEGATIVE
RSV RNA SPEC QL NAA+PROBE: NOT DETECTED
RSV RNA SPEC QL NAA+PROBE: NOT DETECTED
RV+EV RNA SPEC QL NAA+PROBE: NOT DETECTED
S PNEUM AG SPEC QL: NEGATIVE
SA 1  COMMENTS: NORMAL
SA 1  COMMENTS: NORMAL
SA 1 CELL: NORMAL
SA 1 CELL: NORMAL
SA 1 TEST METHOD: NORMAL
SA 1 TEST METHOD: NORMAL
SA 2 CELL: NORMAL
SA 2 CELL: NORMAL
SA 2 COMMENTS: NORMAL
SA 2 COMMENTS: NORMAL
SA 2 TEST METHOD: NORMAL
SA 2 TEST METHOD: NORMAL
SA TARGET DNA: NEGATIVE
SA1 HI RISK ABY: NORMAL
SA1 HI RISK ABY: NORMAL
SA1 MOD RISK ABY: NORMAL
SA1 MOD RISK ABY: NORMAL
SA2 HI RISK ABY: NORMAL
SA2 HI RISK ABY: NORMAL
SA2 MOD RISK ABY: NORMAL
SA2 MOD RISK ABY: NORMAL
SAO2 % BLDA: 86 % (ref 92–100)
SAO2 % BLDA: 90 % (ref 92–100)
SAO2 % BLDA: 91 % (ref 92–100)
SAO2 % BLDA: 91 % (ref 92–100)
SAO2 % BLDA: 93 % (ref 92–100)
SAO2 % BLDA: 93 % (ref 92–100)
SAO2 % BLDA: 94 % (ref 92–100)
SAO2 % BLDA: 95 % (ref 92–100)
SAO2 % BLDA: 95 % (ref 92–100)
SAO2 % BLDA: 96 % (ref 92–100)
SAO2 % BLDA: 97 % (ref 92–100)
SAO2 % BLDA: 98 % (ref 92–100)
SAO2 % BLDV: 39.7 % (ref 70–75)
SAO2 % BLDV: 41 % (ref 70–75)
SAO2 % BLDV: 51 % (ref 70–75)
SAO2 % BLDV: 53.1 % (ref 70–75)
SAO2 % BLDV: 59 % (ref 70–75)
SAO2 % BLDV: 63 % (ref 70–75)
SAO2 % BLDV: 63.3 % (ref 70–75)
SAO2 % BLDV: 64 % (ref 70–75)
SAO2 % BLDV: 69 % (ref 70–75)
SAO2 % BLDV: 76.2 % (ref 70–75)
SAO2 % BLDV: 78 % (ref 70–75)
SAO2 % BLDV: 84 % (ref 70–75)
SAO2 % BLDV: 85.2 % (ref 70–75)
SAO2 % BLDV: 85.8 % (ref 70–75)
SAO2 % BLDV: 87.8 % (ref 70–75)
SAO2 % BLDV: 88 % (ref 70–75)
SAO2 % BLDV: 90.7 % (ref 70–75)
SAO2 % BLDV: 99.8 % (ref 70–75)
SARS-COV-2 RNA RESP QL NAA+PROBE: NEGATIVE
SARS-COV-2 RNA RESP QL NAA+PROBE: NEGATIVE
SCANNED LAB RESULT: NORMAL
SCANNED LAB RESULT: NORMAL
SICKLE CELLS BLD QL SMEAR: NORMAL
SMUDGE CELLS BLD QL SMEAR: NORMAL
SODIUM BLD-SCNC: 133 MMOL/L (ref 135–145)
SODIUM BLD-SCNC: 135 MMOL/L (ref 135–145)
SODIUM BLD-SCNC: 137 MMOL/L (ref 135–145)
SODIUM BLD-SCNC: 137 MMOL/L (ref 135–145)
SODIUM BLD-SCNC: 138 MMOL/L (ref 135–145)
SODIUM SERPL-SCNC: 127 MMOL/L (ref 135–145)
SODIUM SERPL-SCNC: 129 MMOL/L (ref 135–145)
SODIUM SERPL-SCNC: 130 MMOL/L (ref 135–145)
SODIUM SERPL-SCNC: 130 MMOL/L (ref 135–145)
SODIUM SERPL-SCNC: 131 MMOL/L (ref 135–145)
SODIUM SERPL-SCNC: 132 MMOL/L (ref 135–145)
SODIUM SERPL-SCNC: 133 MMOL/L (ref 135–145)
SODIUM SERPL-SCNC: 134 MMOL/L (ref 135–145)
SODIUM SERPL-SCNC: 135 MMOL/L (ref 135–145)
SODIUM SERPL-SCNC: 136 MMOL/L (ref 135–145)
SODIUM SERPL-SCNC: 136 MMOL/L (ref 135–145)
SODIUM SERPL-SCNC: 137 MMOL/L (ref 135–145)
SODIUM SERPL-SCNC: 138 MMOL/L (ref 135–145)
SODIUM SERPL-SCNC: 139 MMOL/L (ref 135–145)
SODIUM SERPL-SCNC: 140 MMOL/L (ref 135–145)
SP GR UR STRIP: 1.02 (ref 1–1.03)
SPECIMEN EXPIRATION DATE: NORMAL
SPHEROCYTES BLD QL SMEAR: NORMAL
STOMATOCYTES BLD QL SMEAR: NORMAL
SYSTOLIC BLOOD PRESSURE - MUSE: NORMAL MMHG
T AXIS - MUSE: -23 DEGREES
T AXIS - MUSE: -28 DEGREES
T AXIS - MUSE: -30 DEGREES
T AXIS - MUSE: -33 DEGREES
T AXIS - MUSE: -35 DEGREES
T AXIS - MUSE: -36 DEGREES
T AXIS - MUSE: -37 DEGREES
T AXIS - MUSE: -38 DEGREES
T AXIS - MUSE: -40 DEGREES
T AXIS - MUSE: -42 DEGREES
T AXIS - MUSE: -43 DEGREES
T AXIS - MUSE: -45 DEGREES
T AXIS - MUSE: -46 DEGREES
T AXIS - MUSE: -46 DEGREES
T AXIS - MUSE: -47 DEGREES
T AXIS - MUSE: -47 DEGREES
T AXIS - MUSE: -49 DEGREES
T AXIS - MUSE: -49 DEGREES
T AXIS - MUSE: -5 DEGREES
T AXIS - MUSE: -5 DEGREES
T AXIS - MUSE: -51 DEGREES
T AXIS - MUSE: -52 DEGREES
T AXIS - MUSE: -53 DEGREES
T AXIS - MUSE: -54 DEGREES
T AXIS - MUSE: -54 DEGREES
T AXIS - MUSE: -57 DEGREES
T AXIS - MUSE: -58 DEGREES
T AXIS - MUSE: -72 DEGREES
T AXIS - MUSE: 10 DEGREES
T AXIS - MUSE: 112 DEGREES
T AXIS - MUSE: 115 DEGREES
T AXIS - MUSE: 115 DEGREES
T AXIS - MUSE: 124 DEGREES
T AXIS - MUSE: 132 DEGREES
T AXIS - MUSE: 154 DEGREES
T AXIS - MUSE: 213 DEGREES
T AXIS - MUSE: 238 DEGREES
T AXIS - MUSE: 37 DEGREES
T AXIS - MUSE: 39 DEGREES
T AXIS - MUSE: 47 DEGREES
T AXIS - MUSE: 94 DEGREES
TACROLIMUS BLD-MCNC: 10.2 UG/L (ref 5–15)
TACROLIMUS BLD-MCNC: 10.3 UG/L (ref 5–15)
TACROLIMUS BLD-MCNC: 10.4 UG/L (ref 5–15)
TACROLIMUS BLD-MCNC: 10.7 UG/L (ref 5–15)
TACROLIMUS BLD-MCNC: 10.8 UG/L (ref 5–15)
TACROLIMUS BLD-MCNC: 10.9 UG/L (ref 5–15)
TACROLIMUS BLD-MCNC: 11.1 UG/L (ref 5–15)
TACROLIMUS BLD-MCNC: 11.2 UG/L (ref 5–15)
TACROLIMUS BLD-MCNC: 11.7 UG/L (ref 5–15)
TACROLIMUS BLD-MCNC: 11.9 UG/L (ref 5–15)
TACROLIMUS BLD-MCNC: 12.1 UG/L (ref 5–15)
TACROLIMUS BLD-MCNC: 12.6 UG/L (ref 5–15)
TACROLIMUS BLD-MCNC: 12.7 UG/L (ref 5–15)
TACROLIMUS BLD-MCNC: 13.4 UG/L (ref 5–15)
TACROLIMUS BLD-MCNC: 14.8 UG/L (ref 5–15)
TACROLIMUS BLD-MCNC: 14.9 UG/L (ref 5–15)
TACROLIMUS BLD-MCNC: 15.8 UG/L (ref 5–15)
TACROLIMUS BLD-MCNC: 18 UG/L (ref 5–15)
TACROLIMUS BLD-MCNC: 4.1 UG/L (ref 5–15)
TACROLIMUS BLD-MCNC: 4.1 UG/L (ref 5–15)
TACROLIMUS BLD-MCNC: 4.5 UG/L (ref 5–15)
TACROLIMUS BLD-MCNC: 5.1 UG/L (ref 5–15)
TACROLIMUS BLD-MCNC: 5.1 UG/L (ref 5–15)
TACROLIMUS BLD-MCNC: 5.3 UG/L (ref 5–15)
TACROLIMUS BLD-MCNC: 5.9 UG/L (ref 5–15)
TACROLIMUS BLD-MCNC: 6 UG/L (ref 5–15)
TACROLIMUS BLD-MCNC: 6.2 UG/L (ref 5–15)
TACROLIMUS BLD-MCNC: 6.6 UG/L (ref 5–15)
TACROLIMUS BLD-MCNC: 6.6 UG/L (ref 5–15)
TACROLIMUS BLD-MCNC: 6.9 UG/L (ref 5–15)
TACROLIMUS BLD-MCNC: 7.3 UG/L (ref 5–15)
TACROLIMUS BLD-MCNC: 7.4 UG/L (ref 5–15)
TACROLIMUS BLD-MCNC: 7.8 UG/L (ref 5–15)
TACROLIMUS BLD-MCNC: 7.9 UG/L (ref 5–15)
TACROLIMUS BLD-MCNC: 8.1 UG/L (ref 5–15)
TACROLIMUS BLD-MCNC: 8.3 UG/L (ref 5–15)
TACROLIMUS BLD-MCNC: 8.7 UG/L (ref 5–15)
TACROLIMUS BLD-MCNC: 8.7 UG/L (ref 5–15)
TACROLIMUS BLD-MCNC: 9 UG/L (ref 5–15)
TACROLIMUS BLD-MCNC: 9.5 UG/L (ref 5–15)
TACROLIMUS BLD-MCNC: 9.6 UG/L (ref 5–15)
TACROLIMUS BLD-MCNC: 9.8 UG/L (ref 5–15)
TARGETS BLD QL SMEAR: ABNORMAL
TARGETS BLD QL SMEAR: NORMAL
TARGETS BLD QL SMEAR: SLIGHT
TME LAST DOSE: ABNORMAL H
TME LAST DOSE: NORMAL H
TOXIC GRANULES BLD QL SMEAR: NORMAL
TROPONIN T SERPL HS-MCNC: 480 NG/L
TROPONIN T SERPL HS-MCNC: 482 NG/L
TROPONIN T SERPL HS-MCNC: 499 NG/L
TROPONIN T SERPL HS-MCNC: 80 NG/L
TSH SERPL DL<=0.005 MIU/L-ACNC: 1.63 UIU/ML (ref 0.3–4.2)
TSH SERPL DL<=0.005 MIU/L-ACNC: 2.98 UIU/ML (ref 0.3–4.2)
UNACCEPTABLE ANTIGENS: NORMAL
UNACCEPTABLE ANTIGENS: NORMAL
UNIT ABO/RH: NORMAL
UNIT ABO/RH: NORMAL
UNIT NUMBER: NORMAL
UNIT NUMBER: NORMAL
UNIT STATUS: NORMAL
UNIT STATUS: NORMAL
UNIT TYPE ISBT: 6200
UNIT TYPE ISBT: 6200
UNOS CPRA: 88
UNOS CPRA: 88
UROBILINOGEN UR STRIP-MCNC: NORMAL MG/DL
VANCOMYCIN SERPL-MCNC: 15.6 UG/ML
VARIANT LYMPHS BLD QL SMEAR: NORMAL
VENTRICULAR RATE- MUSE: 100 BPM
VENTRICULAR RATE- MUSE: 103 BPM
VENTRICULAR RATE- MUSE: 103 BPM
VENTRICULAR RATE- MUSE: 105 BPM
VENTRICULAR RATE- MUSE: 106 BPM
VENTRICULAR RATE- MUSE: 111 BPM
VENTRICULAR RATE- MUSE: 114 BPM
VENTRICULAR RATE- MUSE: 123 BPM
VENTRICULAR RATE- MUSE: 126 BPM
VENTRICULAR RATE- MUSE: 131 BPM
VENTRICULAR RATE- MUSE: 69 BPM
VENTRICULAR RATE- MUSE: 70 BPM
VENTRICULAR RATE- MUSE: 73 BPM
VENTRICULAR RATE- MUSE: 74 BPM
VENTRICULAR RATE- MUSE: 76 BPM
VENTRICULAR RATE- MUSE: 76 BPM
VENTRICULAR RATE- MUSE: 77 BPM
VENTRICULAR RATE- MUSE: 77 BPM
VENTRICULAR RATE- MUSE: 78 BPM
VENTRICULAR RATE- MUSE: 79 BPM
VENTRICULAR RATE- MUSE: 80 BPM
VENTRICULAR RATE- MUSE: 81 BPM
VENTRICULAR RATE- MUSE: 81 BPM
VENTRICULAR RATE- MUSE: 82 BPM
VENTRICULAR RATE- MUSE: 83 BPM
VENTRICULAR RATE- MUSE: 84 BPM
VENTRICULAR RATE- MUSE: 85 BPM
VENTRICULAR RATE- MUSE: 86 BPM
VENTRICULAR RATE- MUSE: 88 BPM
VENTRICULAR RATE- MUSE: 89 BPM
VENTRICULAR RATE- MUSE: 90 BPM
VENTRICULAR RATE- MUSE: 95 BPM
VENTRICULAR RATE- MUSE: 97 BPM
VENTRICULAR RATE- MUSE: 98 BPM
VENTRICULAR RATE- MUSE: 99 BPM
VIT D+METAB SERPL-MCNC: 37 NG/ML (ref 20–50)
WBC # BLD AUTO: 1 10E3/UL (ref 4–11)
WBC # BLD AUTO: 1.1 10E3/UL (ref 4–11)
WBC # BLD AUTO: 1.3 10E3/UL (ref 4–11)
WBC # BLD AUTO: 2.2 10E3/UL (ref 4–11)
WBC # BLD AUTO: 2.3 10E3/UL (ref 4–11)
WBC # BLD AUTO: 2.4 10E3/UL (ref 4–11)
WBC # BLD AUTO: 2.4 10E3/UL (ref 4–11)
WBC # BLD AUTO: 2.5 10E3/UL (ref 4–11)
WBC # BLD AUTO: 2.6 10E3/UL (ref 4–11)
WBC # BLD AUTO: 2.6 10E3/UL (ref 4–11)
WBC # BLD AUTO: 2.7 10E3/UL (ref 4–11)
WBC # BLD AUTO: 2.9 10E3/UL (ref 4–11)
WBC # BLD AUTO: 3 10E3/UL (ref 4–11)
WBC # BLD AUTO: 3.1 10E3/UL (ref 4–11)
WBC # BLD AUTO: 3.1 10E3/UL (ref 4–11)
WBC # BLD AUTO: 3.2 10E3/UL (ref 4–11)
WBC # BLD AUTO: 3.2 10E3/UL (ref 4–11)
WBC # BLD AUTO: 3.6 10E3/UL (ref 4–11)
WBC # BLD AUTO: 3.8 10E3/UL (ref 4–11)
WBC # BLD AUTO: 3.8 10E3/UL (ref 4–11)
WBC # BLD AUTO: 4 10E3/UL (ref 4–11)
WBC # BLD AUTO: 4 10E3/UL (ref 4–11)
WBC # BLD AUTO: 4.2 10E3/UL (ref 4–11)
WBC # BLD AUTO: 4.4 10E3/UL (ref 4–11)
WBC # BLD AUTO: 4.5 10E3/UL (ref 4–11)
WBC # BLD AUTO: 4.7 10E3/UL (ref 4–11)
WBC # BLD AUTO: 4.7 10E3/UL (ref 4–11)
WBC # BLD AUTO: 4.8 10E3/UL (ref 4–11)
WBC # BLD AUTO: 5.1 10E3/UL (ref 4–11)
WBC # BLD AUTO: 5.2 10E3/UL (ref 4–11)
WBC # BLD AUTO: 5.2 10E3/UL (ref 4–11)
WBC # BLD AUTO: 5.4 10E3/UL (ref 4–11)
WBC # BLD AUTO: 5.5 10E3/UL (ref 4–11)
WBC # BLD AUTO: 5.8 10E3/UL (ref 4–11)
WBC # BLD AUTO: 6 10E3/UL (ref 4–11)
WBC # BLD AUTO: 6.3 10E3/UL (ref 4–11)
WBC # BLD AUTO: 6.4 10E3/UL (ref 4–11)
WBC # BLD AUTO: 6.5 10E3/UL (ref 4–11)
WBC # BLD AUTO: 6.8 10E3/UL (ref 4–11)
WBC # BLD AUTO: 6.8 10E3/UL (ref 4–11)
WBC # BLD AUTO: 7.8 10E3/UL (ref 4–11)
WBC # FLD AUTO: 126 /UL
WBC # FLD AUTO: 145 /UL
WBC # FLD AUTO: 56 /UL
WBC CLUMPS #/AREA URNS HPF: PRESENT /HPF
WBC URINE: >182 /HPF

## 2024-01-01 PROCEDURE — 99233 SBSQ HOSP IP/OBS HIGH 50: CPT | Performed by: STUDENT IN AN ORGANIZED HEALTH CARE EDUCATION/TRAINING PROGRAM

## 2024-01-01 PROCEDURE — 85007 BL SMEAR W/DIFF WBC COUNT: CPT | Performed by: NURSE PRACTITIONER

## 2024-01-01 PROCEDURE — 83735 ASSAY OF MAGNESIUM: CPT | Performed by: CLINICAL NURSE SPECIALIST

## 2024-01-01 PROCEDURE — 71045 X-RAY EXAM CHEST 1 VIEW: CPT

## 2024-01-01 PROCEDURE — 250N000011 HC RX IP 250 OP 636: Mod: JZ | Performed by: INTERNAL MEDICINE

## 2024-01-01 PROCEDURE — 85025 COMPLETE CBC W/AUTO DIFF WBC: CPT

## 2024-01-01 PROCEDURE — 94640 AIRWAY INHALATION TREATMENT: CPT | Mod: 76

## 2024-01-01 PROCEDURE — 258N000003 HC RX IP 258 OP 636

## 2024-01-01 PROCEDURE — 99233 SBSQ HOSP IP/OBS HIGH 50: CPT | Mod: FS | Performed by: CLINICAL NURSE SPECIALIST

## 2024-01-01 PROCEDURE — 250N000012 HC RX MED GY IP 250 OP 636 PS 637: Performed by: NURSE PRACTITIONER

## 2024-01-01 PROCEDURE — 94003 VENT MGMT INPAT SUBQ DAY: CPT

## 2024-01-01 PROCEDURE — 84100 ASSAY OF PHOSPHORUS: CPT | Performed by: INTERNAL MEDICINE

## 2024-01-01 PROCEDURE — 82248 BILIRUBIN DIRECT: CPT | Performed by: CLINICAL NURSE SPECIALIST

## 2024-01-01 PROCEDURE — 634N000001 HC RX 634: Performed by: CLINICAL NURSE SPECIALIST

## 2024-01-01 PROCEDURE — 200N000002 HC R&B ICU UMMC

## 2024-01-01 PROCEDURE — 250N000009 HC RX 250: Performed by: INTERNAL MEDICINE

## 2024-01-01 PROCEDURE — 82565 ASSAY OF CREATININE: CPT

## 2024-01-01 PROCEDURE — 250N000009 HC RX 250: Performed by: STUDENT IN AN ORGANIZED HEALTH CARE EDUCATION/TRAINING PROGRAM

## 2024-01-01 PROCEDURE — 250N000012 HC RX MED GY IP 250 OP 636 PS 637

## 2024-01-01 PROCEDURE — 999N000009 HC STATISTIC AIRWAY CARE

## 2024-01-01 PROCEDURE — 85045 AUTOMATED RETICULOCYTE COUNT: CPT

## 2024-01-01 PROCEDURE — 90937 HEMODIALYSIS REPEATED EVAL: CPT

## 2024-01-01 PROCEDURE — 250N000011 HC RX IP 250 OP 636: Mod: JZ

## 2024-01-01 PROCEDURE — 90947 DIALYSIS REPEATED EVAL: CPT

## 2024-01-01 PROCEDURE — 99232 SBSQ HOSP IP/OBS MODERATE 35: CPT | Performed by: STUDENT IN AN ORGANIZED HEALTH CARE EDUCATION/TRAINING PROGRAM

## 2024-01-01 PROCEDURE — 94640 AIRWAY INHALATION TREATMENT: CPT

## 2024-01-01 PROCEDURE — 83605 ASSAY OF LACTIC ACID: CPT

## 2024-01-01 PROCEDURE — 250N000013 HC RX MED GY IP 250 OP 250 PS 637

## 2024-01-01 PROCEDURE — 93306 TTE W/DOPPLER COMPLETE: CPT

## 2024-01-01 PROCEDURE — 93010 ELECTROCARDIOGRAM REPORT: CPT | Mod: GC | Performed by: INTERNAL MEDICINE

## 2024-01-01 PROCEDURE — C1769 GUIDE WIRE: HCPCS | Performed by: STUDENT IN AN ORGANIZED HEALTH CARE EDUCATION/TRAINING PROGRAM

## 2024-01-01 PROCEDURE — 250N000009 HC RX 250

## 2024-01-01 PROCEDURE — 272N000564 HC SHEATH CR2

## 2024-01-01 PROCEDURE — 84155 ASSAY OF PROTEIN SERUM: CPT | Performed by: INTERNAL MEDICINE

## 2024-01-01 PROCEDURE — 258N000003 HC RX IP 258 OP 636: Mod: JZ

## 2024-01-01 PROCEDURE — 84100 ASSAY OF PHOSPHORUS: CPT

## 2024-01-01 PROCEDURE — 93010 ELECTROCARDIOGRAM REPORT: CPT | Performed by: INTERNAL MEDICINE

## 2024-01-01 PROCEDURE — 93005 ELECTROCARDIOGRAM TRACING: CPT | Performed by: INTERNAL MEDICINE

## 2024-01-01 PROCEDURE — 80048 BASIC METABOLIC PNL TOTAL CA: CPT

## 2024-01-01 PROCEDURE — 84484 ASSAY OF TROPONIN QUANT: CPT

## 2024-01-01 PROCEDURE — 93990 DOPPLER FLOW TESTING: CPT

## 2024-01-01 PROCEDURE — 99233 SBSQ HOSP IP/OBS HIGH 50: CPT | Mod: 24 | Performed by: PHYSICIAN ASSISTANT

## 2024-01-01 PROCEDURE — 85007 BL SMEAR W/DIFF WBC COUNT: CPT

## 2024-01-01 PROCEDURE — 71045 X-RAY EXAM CHEST 1 VIEW: CPT | Mod: 26 | Performed by: STUDENT IN AN ORGANIZED HEALTH CARE EDUCATION/TRAINING PROGRAM

## 2024-01-01 PROCEDURE — 258N000003 HC RX IP 258 OP 636: Performed by: CLINICAL NURSE SPECIALIST

## 2024-01-01 PROCEDURE — 36415 COLL VENOUS BLD VENIPUNCTURE: CPT

## 2024-01-01 PROCEDURE — 97530 THERAPEUTIC ACTIVITIES: CPT | Mod: GP

## 2024-01-01 PROCEDURE — 84075 ASSAY ALKALINE PHOSPHATASE: CPT | Performed by: CLINICAL NURSE SPECIALIST

## 2024-01-01 PROCEDURE — 82550 ASSAY OF CK (CPK): CPT | Performed by: INTERNAL MEDICINE

## 2024-01-01 PROCEDURE — 86923 COMPATIBILITY TEST ELECTRIC: CPT

## 2024-01-01 PROCEDURE — 82040 ASSAY OF SERUM ALBUMIN: CPT

## 2024-01-01 PROCEDURE — 250N000013 HC RX MED GY IP 250 OP 250 PS 637: Performed by: NURSE PRACTITIONER

## 2024-01-01 PROCEDURE — 0B968ZX DRAINAGE OF RIGHT LOWER LOBE BRONCHUS, VIA NATURAL OR ARTIFICIAL OPENING ENDOSCOPIC, DIAGNOSTIC: ICD-10-PCS | Performed by: STUDENT IN AN ORGANIZED HEALTH CARE EDUCATION/TRAINING PROGRAM

## 2024-01-01 PROCEDURE — 258N000001 HC RX 258

## 2024-01-01 PROCEDURE — 88312 SPECIAL STAINS GROUP 1: CPT | Mod: TC | Performed by: STUDENT IN AN ORGANIZED HEALTH CARE EDUCATION/TRAINING PROGRAM

## 2024-01-01 PROCEDURE — 999N000253 HC STATISTIC WEANING TRIALS

## 2024-01-01 PROCEDURE — 99152 MOD SED SAME PHYS/QHP 5/>YRS: CPT | Mod: GC | Performed by: STUDENT IN AN ORGANIZED HEALTH CARE EDUCATION/TRAINING PROGRAM

## 2024-01-01 PROCEDURE — 93005 ELECTROCARDIOGRAM TRACING: CPT | Performed by: NURSE PRACTITIONER

## 2024-01-01 PROCEDURE — 82805 BLOOD GASES W/O2 SATURATION: CPT

## 2024-01-01 PROCEDURE — 999N000157 HC STATISTIC RCP TIME EA 10 MIN

## 2024-01-01 PROCEDURE — 250N000011 HC RX IP 250 OP 636

## 2024-01-01 PROCEDURE — 250N000011 HC RX IP 250 OP 636: Performed by: STUDENT IN AN ORGANIZED HEALTH CARE EDUCATION/TRAINING PROGRAM

## 2024-01-01 PROCEDURE — 97110 THERAPEUTIC EXERCISES: CPT | Mod: GP

## 2024-01-01 PROCEDURE — 93010 ELECTROCARDIOGRAM REPORT: CPT | Mod: HIP | Performed by: INTERNAL MEDICINE

## 2024-01-01 PROCEDURE — 97530 THERAPEUTIC ACTIVITIES: CPT | Mod: GO

## 2024-01-01 PROCEDURE — 250N000013 HC RX MED GY IP 250 OP 250 PS 637: Performed by: INTERNAL MEDICINE

## 2024-01-01 PROCEDURE — 250N000013 HC RX MED GY IP 250 OP 250 PS 637: Performed by: HOSPITALIST

## 2024-01-01 PROCEDURE — 255N000002 HC RX 255 OP 636: Performed by: RADIOLOGY

## 2024-01-01 PROCEDURE — 99418 PROLNG IP/OBS E/M EA 15 MIN: CPT | Performed by: STUDENT IN AN ORGANIZED HEALTH CARE EDUCATION/TRAINING PROGRAM

## 2024-01-01 PROCEDURE — 85048 AUTOMATED LEUKOCYTE COUNT: CPT

## 2024-01-01 PROCEDURE — 71045 X-RAY EXAM CHEST 1 VIEW: CPT | Mod: 26 | Performed by: RADIOLOGY

## 2024-01-01 PROCEDURE — 250N000009 HC RX 250: Performed by: NURSE PRACTITIONER

## 2024-01-01 PROCEDURE — 82330 ASSAY OF CALCIUM: CPT

## 2024-01-01 PROCEDURE — 87186 SC STD MICRODIL/AGAR DIL: CPT

## 2024-01-01 PROCEDURE — 250N000012 HC RX MED GY IP 250 OP 636 PS 637: Performed by: HOSPITALIST

## 2024-01-01 PROCEDURE — 999N000179 XR SURGERY CARM FLUORO LESS THAN 5 MIN W STILLS: Mod: TC

## 2024-01-01 PROCEDURE — 250N000011 HC RX IP 250 OP 636: Performed by: INTERNAL MEDICINE

## 2024-01-01 PROCEDURE — 258N000003 HC RX IP 258 OP 636: Performed by: INTERNAL MEDICINE

## 2024-01-01 PROCEDURE — 99232 SBSQ HOSP IP/OBS MODERATE 35: CPT | Mod: GC | Performed by: INTERNAL MEDICINE

## 2024-01-01 PROCEDURE — 87252 VIRUS INOCULATION TISSUE: CPT | Performed by: INTERNAL MEDICINE

## 2024-01-01 PROCEDURE — 99222 1ST HOSP IP/OBS MODERATE 55: CPT | Performed by: NURSE PRACTITIONER

## 2024-01-01 PROCEDURE — 90935 HEMODIALYSIS ONE EVALUATION: CPT | Performed by: PHYSICIAN ASSISTANT

## 2024-01-01 PROCEDURE — 84100 ASSAY OF PHOSPHORUS: CPT | Performed by: CLINICAL NURSE SPECIALIST

## 2024-01-01 PROCEDURE — 93005 ELECTROCARDIOGRAM TRACING: CPT

## 2024-01-01 PROCEDURE — 74018 RADEX ABDOMEN 1 VIEW: CPT | Mod: 26 | Performed by: RADIOLOGY

## 2024-01-01 PROCEDURE — 89050 BODY FLUID CELL COUNT: CPT | Performed by: STUDENT IN AN ORGANIZED HEALTH CARE EDUCATION/TRAINING PROGRAM

## 2024-01-01 PROCEDURE — 370N000003 HC ANESTHESIA WARD SERVICE

## 2024-01-01 PROCEDURE — 94003 VENT MGMT INPAT SUBQ DAY: CPT | Performed by: NURSE PRACTITIONER

## 2024-01-01 PROCEDURE — 99231 SBSQ HOSP IP/OBS SF/LOW 25: CPT | Performed by: INTERNAL MEDICINE

## 2024-01-01 PROCEDURE — 36620 INSERTION CATHETER ARTERY: CPT | Mod: GC | Performed by: INTERNAL MEDICINE

## 2024-01-01 PROCEDURE — 250N000013 HC RX MED GY IP 250 OP 250 PS 637: Performed by: STUDENT IN AN ORGANIZED HEALTH CARE EDUCATION/TRAINING PROGRAM

## 2024-01-01 PROCEDURE — 82784 ASSAY IGA/IGD/IGG/IGM EACH: CPT | Performed by: NURSE PRACTITIONER

## 2024-01-01 PROCEDURE — 250N000011 HC RX IP 250 OP 636: Mod: JZ | Performed by: CLINICAL NURSE SPECIALIST

## 2024-01-01 PROCEDURE — 88312 SPECIAL STAINS GROUP 1: CPT | Mod: 26 | Performed by: PATHOLOGY

## 2024-01-01 PROCEDURE — 31622 DX BRONCHOSCOPE/WASH: CPT

## 2024-01-01 PROCEDURE — 36415 COLL VENOUS BLD VENIPUNCTURE: CPT | Performed by: INTERNAL MEDICINE

## 2024-01-01 PROCEDURE — 80187 DRUG ASSAY POSACONAZOLE: CPT | Performed by: HOSPITALIST

## 2024-01-01 PROCEDURE — 99222 1ST HOSP IP/OBS MODERATE 55: CPT | Performed by: PHYSICIAN ASSISTANT

## 2024-01-01 PROCEDURE — 634N000001 HC RX 634: Performed by: INTERNAL MEDICINE

## 2024-01-01 PROCEDURE — 84295 ASSAY OF SERUM SODIUM: CPT

## 2024-01-01 PROCEDURE — 82330 ASSAY OF CALCIUM: CPT | Performed by: CLINICAL NURSE SPECIALIST

## 2024-01-01 PROCEDURE — 90935 HEMODIALYSIS ONE EVALUATION: CPT

## 2024-01-01 PROCEDURE — 84100 ASSAY OF PHOSPHORUS: CPT | Performed by: HOSPITALIST

## 2024-01-01 PROCEDURE — 99291 CRITICAL CARE FIRST HOUR: CPT | Performed by: INTERNAL MEDICINE

## 2024-01-01 PROCEDURE — 85014 HEMATOCRIT: CPT

## 2024-01-01 PROCEDURE — 85027 COMPLETE CBC AUTOMATED: CPT | Performed by: PATHOLOGY

## 2024-01-01 PROCEDURE — 258N000003 HC RX IP 258 OP 636: Performed by: STUDENT IN AN ORGANIZED HEALTH CARE EDUCATION/TRAINING PROGRAM

## 2024-01-01 PROCEDURE — 250N000012 HC RX MED GY IP 250 OP 636 PS 637: Performed by: PHYSICIAN ASSISTANT

## 2024-01-01 PROCEDURE — C1874 STENT, COATED/COV W/DEL SYS: HCPCS | Performed by: STUDENT IN AN ORGANIZED HEALTH CARE EDUCATION/TRAINING PROGRAM

## 2024-01-01 PROCEDURE — 71250 CT THORAX DX C-: CPT | Mod: MG

## 2024-01-01 PROCEDURE — 36620 INSERTION CATHETER ARTERY: CPT | Performed by: ANESTHESIOLOGY

## 2024-01-01 PROCEDURE — 86900 BLOOD TYPING SEROLOGIC ABO: CPT

## 2024-01-01 PROCEDURE — 85048 AUTOMATED LEUKOCYTE COUNT: CPT | Performed by: STUDENT IN AN ORGANIZED HEALTH CARE EDUCATION/TRAINING PROGRAM

## 2024-01-01 PROCEDURE — 120N000017 HC R&B RESPIRATORY CARE

## 2024-01-01 PROCEDURE — 86352 CELL FUNCTION ASSAY W/STIM: CPT | Performed by: PHYSICIAN ASSISTANT

## 2024-01-01 PROCEDURE — 250N000011 HC RX IP 250 OP 636: Performed by: HOSPITALIST

## 2024-01-01 PROCEDURE — 93010 ELECTROCARDIOGRAM REPORT: CPT | Mod: XP | Performed by: INTERNAL MEDICINE

## 2024-01-01 PROCEDURE — 36415 COLL VENOUS BLD VENIPUNCTURE: CPT | Performed by: NURSE PRACTITIONER

## 2024-01-01 PROCEDURE — 99214 OFFICE O/P EST MOD 30 MIN: CPT | Mod: 25 | Performed by: PHYSICIAN ASSISTANT

## 2024-01-01 PROCEDURE — 80197 ASSAY OF TACROLIMUS: CPT | Performed by: PHYSICIAN ASSISTANT

## 2024-01-01 PROCEDURE — 99223 1ST HOSP IP/OBS HIGH 75: CPT | Mod: AI | Performed by: HOSPITALIST

## 2024-01-01 PROCEDURE — 99291 CRITICAL CARE FIRST HOUR: CPT | Mod: GC | Performed by: INTERNAL MEDICINE

## 2024-01-01 PROCEDURE — 87116 MYCOBACTERIA CULTURE: CPT | Performed by: INTERNAL MEDICINE

## 2024-01-01 PROCEDURE — 250N000012 HC RX MED GY IP 250 OP 636 PS 637: Performed by: INTERNAL MEDICINE

## 2024-01-01 PROCEDURE — 250N000009 HC RX 250: Performed by: CLINICAL NURSE SPECIALIST

## 2024-01-01 PROCEDURE — 87206 SMEAR FLUORESCENT/ACID STAI: CPT | Performed by: STUDENT IN AN ORGANIZED HEALTH CARE EDUCATION/TRAINING PROGRAM

## 2024-01-01 PROCEDURE — 999N000065 XR CHEST PORT 1 VIEW

## 2024-01-01 PROCEDURE — 250N000024 HC ISOFLURANE, PER MIN: Performed by: STUDENT IN AN ORGANIZED HEALTH CARE EDUCATION/TRAINING PROGRAM

## 2024-01-01 PROCEDURE — 97110 THERAPEUTIC EXERCISES: CPT | Mod: GO | Performed by: OCCUPATIONAL THERAPIST

## 2024-01-01 PROCEDURE — 36620 INSERTION CATHETER ARTERY: CPT | Performed by: NURSE ANESTHETIST, CERTIFIED REGISTERED

## 2024-01-01 PROCEDURE — 99233 SBSQ HOSP IP/OBS HIGH 50: CPT | Performed by: CLINICAL NURSE SPECIALIST

## 2024-01-01 PROCEDURE — 85027 COMPLETE CBC AUTOMATED: CPT | Performed by: NURSE PRACTITIONER

## 2024-01-01 PROCEDURE — 87899 AGENT NOS ASSAY W/OPTIC: CPT | Performed by: NURSE PRACTITIONER

## 2024-01-01 PROCEDURE — 250N000009 HC RX 250: Performed by: PHYSICIAN ASSISTANT

## 2024-01-01 PROCEDURE — C1887 CATHETER, GUIDING: HCPCS

## 2024-01-01 PROCEDURE — 0BB38ZZ EXCISION OF RIGHT MAIN BRONCHUS, VIA NATURAL OR ARTIFICIAL OPENING ENDOSCOPIC: ICD-10-PCS | Performed by: STUDENT IN AN ORGANIZED HEALTH CARE EDUCATION/TRAINING PROGRAM

## 2024-01-01 PROCEDURE — 97165 OT EVAL LOW COMPLEX 30 MIN: CPT | Mod: GO | Performed by: OCCUPATIONAL THERAPIST

## 2024-01-01 PROCEDURE — 82247 BILIRUBIN TOTAL: CPT

## 2024-01-01 PROCEDURE — 99291 CRITICAL CARE FIRST HOUR: CPT | Mod: 24 | Performed by: INTERNAL MEDICINE

## 2024-01-01 PROCEDURE — 87206 SMEAR FLUORESCENT/ACID STAI: CPT | Performed by: INTERNAL MEDICINE

## 2024-01-01 PROCEDURE — 83735 ASSAY OF MAGNESIUM: CPT

## 2024-01-01 PROCEDURE — C2623 CATH, TRANSLUMIN, DRUG-COAT: HCPCS

## 2024-01-01 PROCEDURE — 87210 SMEAR WET MOUNT SALINE/INK: CPT | Performed by: INTERNAL MEDICINE

## 2024-01-01 PROCEDURE — 86352 CELL FUNCTION ASSAY W/STIM: CPT | Performed by: NURSE PRACTITIONER

## 2024-01-01 PROCEDURE — 634N000001 HC RX 634: Performed by: HOSPITALIST

## 2024-01-01 PROCEDURE — 97530 THERAPEUTIC ACTIVITIES: CPT | Mod: GO | Performed by: OCCUPATIONAL THERAPIST

## 2024-01-01 PROCEDURE — 99418 PROLNG IP/OBS E/M EA 15 MIN: CPT | Performed by: CLINICAL NURSE SPECIALIST

## 2024-01-01 PROCEDURE — 250N000011 HC RX IP 250 OP 636: Performed by: CLINICAL NURSE SPECIALIST

## 2024-01-01 PROCEDURE — 360N000075 HC SURGERY LEVEL 2, PER MIN: Performed by: OTOLARYNGOLOGY

## 2024-01-01 PROCEDURE — 634N000001 HC RX 634: Mod: JZ

## 2024-01-01 PROCEDURE — 84460 ALANINE AMINO (ALT) (SGPT): CPT | Performed by: CLINICAL NURSE SPECIALIST

## 2024-01-01 PROCEDURE — 99418 PROLNG IP/OBS E/M EA 15 MIN: CPT | Performed by: INTERNAL MEDICINE

## 2024-01-01 PROCEDURE — 999N000127 HC STATISTIC PERIPHERAL IV START W US GUIDANCE

## 2024-01-01 PROCEDURE — 0B738ZZ DILATION OF RIGHT MAIN BRONCHUS, VIA NATURAL OR ARTIFICIAL OPENING ENDOSCOPIC: ICD-10-PCS | Performed by: STUDENT IN AN ORGANIZED HEALTH CARE EDUCATION/TRAINING PROGRAM

## 2024-01-01 PROCEDURE — 84100 ASSAY OF PHOSPHORUS: CPT | Performed by: NURSE PRACTITIONER

## 2024-01-01 PROCEDURE — 250N000009 HC RX 250: Performed by: HOSPITALIST

## 2024-01-01 PROCEDURE — 87106 FUNGI IDENTIFICATION YEAST: CPT | Performed by: STUDENT IN AN ORGANIZED HEALTH CARE EDUCATION/TRAINING PROGRAM

## 2024-01-01 PROCEDURE — 86352 CELL FUNCTION ASSAY W/STIM: CPT | Performed by: INTERNAL MEDICINE

## 2024-01-01 PROCEDURE — 999N000185 HC STATISTIC TRANSPORT TIME EA 15 MIN

## 2024-01-01 PROCEDURE — 80053 COMPREHEN METABOLIC PANEL: CPT | Performed by: HOSPITALIST

## 2024-01-01 PROCEDURE — 89050 BODY FLUID CELL COUNT: CPT | Performed by: INTERNAL MEDICINE

## 2024-01-01 PROCEDURE — 97110 THERAPEUTIC EXERCISES: CPT | Mod: GO

## 2024-01-01 PROCEDURE — 99291 CRITICAL CARE FIRST HOUR: CPT | Mod: 25 | Performed by: INTERNAL MEDICINE

## 2024-01-01 PROCEDURE — 0DHA3UZ INSERTION OF FEEDING DEVICE INTO JEJUNUM, PERCUTANEOUS APPROACH: ICD-10-PCS | Performed by: STUDENT IN AN ORGANIZED HEALTH CARE EDUCATION/TRAINING PROGRAM

## 2024-01-01 PROCEDURE — 82040 ASSAY OF SERUM ALBUMIN: CPT | Performed by: CLINICAL NURSE SPECIALIST

## 2024-01-01 PROCEDURE — 99222 1ST HOSP IP/OBS MODERATE 55: CPT | Performed by: FAMILY MEDICINE

## 2024-01-01 PROCEDURE — 0BP08DZ REMOVAL OF INTRALUMINAL DEVICE FROM TRACHEOBRONCHIAL TREE, VIA NATURAL OR ARTIFICIAL OPENING ENDOSCOPIC: ICD-10-PCS | Performed by: STUDENT IN AN ORGANIZED HEALTH CARE EDUCATION/TRAINING PROGRAM

## 2024-01-01 PROCEDURE — 76705 ECHO EXAM OF ABDOMEN: CPT | Mod: 26 | Performed by: STUDENT IN AN ORGANIZED HEALTH CARE EDUCATION/TRAINING PROGRAM

## 2024-01-01 PROCEDURE — 87070 CULTURE OTHR SPECIMN AEROBIC: CPT | Performed by: STUDENT IN AN ORGANIZED HEALTH CARE EDUCATION/TRAINING PROGRAM

## 2024-01-01 PROCEDURE — 250N000011 HC RX IP 250 OP 636: Mod: JZ | Performed by: STUDENT IN AN ORGANIZED HEALTH CARE EDUCATION/TRAINING PROGRAM

## 2024-01-01 PROCEDURE — 84155 ASSAY OF PROTEIN SERUM: CPT

## 2024-01-01 PROCEDURE — 36907 BALO ANGIOP CTR DIALYSIS SEG: CPT | Mod: GC | Performed by: RADIOLOGY

## 2024-01-01 PROCEDURE — 250N000011 HC RX IP 250 OP 636: Performed by: NURSE ANESTHETIST, CERTIFIED REGISTERED

## 2024-01-01 PROCEDURE — 99233 SBSQ HOSP IP/OBS HIGH 50: CPT | Mod: 24 | Performed by: INTERNAL MEDICINE

## 2024-01-01 PROCEDURE — 86833 HLA CLASS II HIGH DEFIN QUAL: CPT | Performed by: INTERNAL MEDICINE

## 2024-01-01 PROCEDURE — 80197 ASSAY OF TACROLIMUS: CPT

## 2024-01-01 PROCEDURE — 99233 SBSQ HOSP IP/OBS HIGH 50: CPT | Performed by: HOSPITALIST

## 2024-01-01 PROCEDURE — 999N000147 HC STATISTIC PT IP EVAL DEFER

## 2024-01-01 PROCEDURE — 99232 SBSQ HOSP IP/OBS MODERATE 35: CPT | Performed by: CLINICAL NURSE SPECIALIST

## 2024-01-01 PROCEDURE — 83735 ASSAY OF MAGNESIUM: CPT | Performed by: PATHOLOGY

## 2024-01-01 PROCEDURE — 80197 ASSAY OF TACROLIMUS: CPT | Performed by: NURSE PRACTITIONER

## 2024-01-01 PROCEDURE — 82550 ASSAY OF CK (CPK): CPT

## 2024-01-01 PROCEDURE — 999N000054 HC STATISTIC EKG NON-CHARGEABLE

## 2024-01-01 PROCEDURE — 05763ZZ DILATION OF LEFT SUBCLAVIAN VEIN, PERCUTANEOUS APPROACH: ICD-10-PCS | Performed by: RADIOLOGY

## 2024-01-01 PROCEDURE — 31624 DX BRONCHOSCOPE/LAVAGE: CPT

## 2024-01-01 PROCEDURE — C9113 INJ PANTOPRAZOLE SODIUM, VIA: HCPCS | Mod: JZ

## 2024-01-01 PROCEDURE — 99233 SBSQ HOSP IP/OBS HIGH 50: CPT | Performed by: PHYSICIAN ASSISTANT

## 2024-01-01 PROCEDURE — 3E043XZ INTRODUCTION OF VASOPRESSOR INTO CENTRAL VEIN, PERCUTANEOUS APPROACH: ICD-10-PCS | Performed by: STUDENT IN AN ORGANIZED HEALTH CARE EDUCATION/TRAINING PROGRAM

## 2024-01-01 PROCEDURE — 99232 SBSQ HOSP IP/OBS MODERATE 35: CPT | Performed by: INTERNAL MEDICINE

## 2024-01-01 PROCEDURE — 0BJ08ZZ INSPECTION OF TRACHEOBRONCHIAL TREE, VIA NATURAL OR ARTIFICIAL OPENING ENDOSCOPIC: ICD-10-PCS | Performed by: STUDENT IN AN ORGANIZED HEALTH CARE EDUCATION/TRAINING PROGRAM

## 2024-01-01 PROCEDURE — 99152 MOD SED SAME PHYS/QHP 5/>YRS: CPT

## 2024-01-01 PROCEDURE — 85027 COMPLETE CBC AUTOMATED: CPT

## 2024-01-01 PROCEDURE — 87305 ASPERGILLUS AG IA: CPT | Performed by: STUDENT IN AN ORGANIZED HEALTH CARE EDUCATION/TRAINING PROGRAM

## 2024-01-01 PROCEDURE — 36415 COLL VENOUS BLD VENIPUNCTURE: CPT | Performed by: STUDENT IN AN ORGANIZED HEALTH CARE EDUCATION/TRAINING PROGRAM

## 2024-01-01 PROCEDURE — 31635 BRONCHOSCOPY W/FB REMOVAL: CPT | Mod: GC | Performed by: STUDENT IN AN ORGANIZED HEALTH CARE EDUCATION/TRAINING PROGRAM

## 2024-01-01 PROCEDURE — 272N000001 HC OR GENERAL SUPPLY STERILE: Performed by: INTERNAL MEDICINE

## 2024-01-01 PROCEDURE — 82805 BLOOD GASES W/O2 SATURATION: CPT | Performed by: INTERNAL MEDICINE

## 2024-01-01 PROCEDURE — 99233 SBSQ HOSP IP/OBS HIGH 50: CPT | Performed by: INTERNAL MEDICINE

## 2024-01-01 PROCEDURE — 87633 RESP VIRUS 12-25 TARGETS: CPT

## 2024-01-01 PROCEDURE — 272N000302 HC DEVICE INFLATION CR5

## 2024-01-01 PROCEDURE — 85018 HEMOGLOBIN: CPT

## 2024-01-01 PROCEDURE — 84145 PROCALCITONIN (PCT): CPT

## 2024-01-01 PROCEDURE — 99223 1ST HOSP IP/OBS HIGH 75: CPT | Mod: FS | Performed by: STUDENT IN AN ORGANIZED HEALTH CARE EDUCATION/TRAINING PROGRAM

## 2024-01-01 PROCEDURE — 71250 CT THORAX DX C-: CPT | Mod: 26 | Performed by: RADIOLOGY

## 2024-01-01 PROCEDURE — G1010 CDSM STANSON: HCPCS | Performed by: RADIOLOGY

## 2024-01-01 PROCEDURE — 250N000013 HC RX MED GY IP 250 OP 250 PS 637: Performed by: CLINICAL NURSE SPECIALIST

## 2024-01-01 PROCEDURE — 360N000084 HC SURGERY LEVEL 4 W/ FLUORO, PER MIN: Performed by: INTERNAL MEDICINE

## 2024-01-01 PROCEDURE — 87106 FUNGI IDENTIFICATION YEAST: CPT | Performed by: INTERNAL MEDICINE

## 2024-01-01 PROCEDURE — 99233 SBSQ HOSP IP/OBS HIGH 50: CPT | Mod: 25 | Performed by: PHYSICIAN ASSISTANT

## 2024-01-01 PROCEDURE — 86900 BLOOD TYPING SEROLOGIC ABO: CPT | Performed by: INTERNAL MEDICINE

## 2024-01-01 PROCEDURE — 85007 BL SMEAR W/DIFF WBC COUNT: CPT | Performed by: PHYSICIAN ASSISTANT

## 2024-01-01 PROCEDURE — 87899 AGENT NOS ASSAY W/OPTIC: CPT

## 2024-01-01 PROCEDURE — 82374 ASSAY BLOOD CARBON DIOXIDE: CPT

## 2024-01-01 PROCEDURE — 87205 SMEAR GRAM STAIN: CPT

## 2024-01-01 PROCEDURE — 83735 ASSAY OF MAGNESIUM: CPT | Performed by: HOSPITALIST

## 2024-01-01 PROCEDURE — 85041 AUTOMATED RBC COUNT: CPT

## 2024-01-01 PROCEDURE — 87070 CULTURE OTHR SPECIMN AEROBIC: CPT | Performed by: INTERNAL MEDICINE

## 2024-01-01 PROCEDURE — 99000 SPECIMEN HANDLING OFFICE-LAB: CPT | Performed by: PATHOLOGY

## 2024-01-01 PROCEDURE — 36415 COLL VENOUS BLD VENIPUNCTURE: CPT | Performed by: PHYSICIAN ASSISTANT

## 2024-01-01 PROCEDURE — 92507 TX SP LANG VOICE COMM INDIV: CPT | Mod: GN | Performed by: SPEECH-LANGUAGE PATHOLOGIST

## 2024-01-01 PROCEDURE — 36556 INSERT NON-TUNNEL CV CATH: CPT | Mod: GC | Performed by: STUDENT IN AN ORGANIZED HEALTH CARE EDUCATION/TRAINING PROGRAM

## 2024-01-01 PROCEDURE — 49452 REPLACE G-J TUBE PERC: CPT

## 2024-01-01 PROCEDURE — 272N000192 HC ACCESSORY CR2

## 2024-01-01 PROCEDURE — 49440 PLACE GASTROSTOMY TUBE PERC: CPT | Mod: GC | Performed by: STUDENT IN AN ORGANIZED HEALTH CARE EDUCATION/TRAINING PROGRAM

## 2024-01-01 PROCEDURE — 99291 CRITICAL CARE FIRST HOUR: CPT | Mod: 25

## 2024-01-01 PROCEDURE — 250N000011 HC RX IP 250 OP 636: Performed by: NURSE PRACTITIONER

## 2024-01-01 PROCEDURE — C1769 GUIDE WIRE: HCPCS

## 2024-01-01 PROCEDURE — 99153 MOD SED SAME PHYS/QHP EA: CPT

## 2024-01-01 PROCEDURE — 634N000001 HC RX 634: Performed by: PHYSICIAN ASSISTANT

## 2024-01-01 PROCEDURE — 85610 PROTHROMBIN TIME: CPT | Performed by: NURSE PRACTITIONER

## 2024-01-01 PROCEDURE — 272N000220 HC BRONCHOSCOPE, DISPOSABLE

## 2024-01-01 PROCEDURE — 84075 ASSAY ALKALINE PHOSPHATASE: CPT

## 2024-01-01 PROCEDURE — 87205 SMEAR GRAM STAIN: CPT | Performed by: STUDENT IN AN ORGANIZED HEALTH CARE EDUCATION/TRAINING PROGRAM

## 2024-01-01 PROCEDURE — 36620 INSERTION CATHETER ARTERY: CPT | Mod: GC | Performed by: STUDENT IN AN ORGANIZED HEALTH CARE EDUCATION/TRAINING PROGRAM

## 2024-01-01 PROCEDURE — 255N000002 HC RX 255 OP 636: Mod: JZ | Performed by: STUDENT IN AN ORGANIZED HEALTH CARE EDUCATION/TRAINING PROGRAM

## 2024-01-01 PROCEDURE — 97530 THERAPEUTIC ACTIVITIES: CPT | Mod: GP | Performed by: REHABILITATION PRACTITIONER

## 2024-01-01 PROCEDURE — 99207 PR NO BILLABLE SERVICE THIS VISIT: CPT | Performed by: HOSPITALIST

## 2024-01-01 PROCEDURE — 86706 HEP B SURFACE ANTIBODY: CPT | Performed by: CLINICAL NURSE SPECIALIST

## 2024-01-01 PROCEDURE — 81001 URINALYSIS AUTO W/SCOPE: CPT

## 2024-01-01 PROCEDURE — 99232 SBSQ HOSP IP/OBS MODERATE 35: CPT | Mod: 24 | Performed by: INTERNAL MEDICINE

## 2024-01-01 PROCEDURE — 999N000111 HC STATISTIC OT IP EVAL DEFER

## 2024-01-01 PROCEDURE — 36902 INTRO CATH DIALYSIS CIRCUIT: CPT | Mod: GC | Performed by: STUDENT IN AN ORGANIZED HEALTH CARE EDUCATION/TRAINING PROGRAM

## 2024-01-01 PROCEDURE — 250N000011 HC RX IP 250 OP 636: Mod: JZ | Performed by: PHYSICIAN ASSISTANT

## 2024-01-01 PROCEDURE — 93306 TTE W/DOPPLER COMPLETE: CPT | Mod: 26 | Performed by: INTERNAL MEDICINE

## 2024-01-01 PROCEDURE — 87798 DETECT AGENT NOS DNA AMP: CPT | Performed by: INTERNAL MEDICINE

## 2024-01-01 PROCEDURE — 99232 SBSQ HOSP IP/OBS MODERATE 35: CPT | Performed by: PHYSICIAN ASSISTANT

## 2024-01-01 PROCEDURE — 84075 ASSAY ALKALINE PHOSPHATASE: CPT | Performed by: HOSPITALIST

## 2024-01-01 PROCEDURE — 74183 MRI ABD W/O CNTR FLWD CNTR: CPT | Mod: MG

## 2024-01-01 PROCEDURE — 36415 COLL VENOUS BLD VENIPUNCTURE: CPT | Performed by: PATHOLOGY

## 2024-01-01 PROCEDURE — 86140 C-REACTIVE PROTEIN: CPT

## 2024-01-01 PROCEDURE — 88108 CYTOPATH CONCENTRATE TECH: CPT | Mod: 26 | Performed by: PATHOLOGY

## 2024-01-01 PROCEDURE — P9016 RBC LEUKOCYTES REDUCED: HCPCS

## 2024-01-01 PROCEDURE — 97110 THERAPEUTIC EXERCISES: CPT | Mod: GP | Performed by: PHYSICAL THERAPIST

## 2024-01-01 PROCEDURE — 99233 SBSQ HOSP IP/OBS HIGH 50: CPT | Mod: FS | Performed by: STUDENT IN AN ORGANIZED HEALTH CARE EDUCATION/TRAINING PROGRAM

## 2024-01-01 PROCEDURE — 87385 HISTOPLASMA CAPSUL AG IA: CPT

## 2024-01-01 PROCEDURE — 634N000001 HC RX 634: Mod: JZ | Performed by: PHYSICIAN ASSISTANT

## 2024-01-01 PROCEDURE — 99233 SBSQ HOSP IP/OBS HIGH 50: CPT | Mod: 25 | Performed by: INTERNAL MEDICINE

## 2024-01-01 PROCEDURE — 99233 SBSQ HOSP IP/OBS HIGH 50: CPT | Mod: FS | Performed by: INTERNAL MEDICINE

## 2024-01-01 PROCEDURE — 272N000151 HC KIT CR11

## 2024-01-01 PROCEDURE — 272N000569 HC SHEATH CR6

## 2024-01-01 PROCEDURE — 80197 ASSAY OF TACROLIMUS: CPT | Performed by: STUDENT IN AN ORGANIZED HEALTH CARE EDUCATION/TRAINING PROGRAM

## 2024-01-01 PROCEDURE — 99291 CRITICAL CARE FIRST HOUR: CPT | Mod: GC | Performed by: STUDENT IN AN ORGANIZED HEALTH CARE EDUCATION/TRAINING PROGRAM

## 2024-01-01 PROCEDURE — A7035 POS AIRWAY PRESS HEADGEAR: HCPCS

## 2024-01-01 PROCEDURE — 255N000002 HC RX 255 OP 636: Performed by: INTERNAL MEDICINE

## 2024-01-01 PROCEDURE — 370N000017 HC ANESTHESIA TECHNICAL FEE, PER MIN: Performed by: INTERNAL MEDICINE

## 2024-01-01 PROCEDURE — 83735 ASSAY OF MAGNESIUM: CPT | Performed by: NURSE PRACTITIONER

## 2024-01-01 PROCEDURE — 93005 ELECTROCARDIOGRAM TRACING: CPT | Performed by: HOSPITALIST

## 2024-01-01 PROCEDURE — 31641 BRONCHOSCOPY TREAT BLOCKAGE: CPT | Mod: GC | Performed by: INTERNAL MEDICINE

## 2024-01-01 PROCEDURE — 80187 DRUG ASSAY POSACONAZOLE: CPT

## 2024-01-01 PROCEDURE — 99233 SBSQ HOSP IP/OBS HIGH 50: CPT | Performed by: NURSE PRACTITIONER

## 2024-01-01 PROCEDURE — 99233 SBSQ HOSP IP/OBS HIGH 50: CPT | Mod: FS | Performed by: PHYSICIAN ASSISTANT

## 2024-01-01 PROCEDURE — 999N000248 HC STATISTIC IV INSERT WITH US BY RN

## 2024-01-01 PROCEDURE — 97162 PT EVAL MOD COMPLEX 30 MIN: CPT | Mod: GP | Performed by: PHYSICAL THERAPIST

## 2024-01-01 PROCEDURE — 76937 US GUIDE VASCULAR ACCESS: CPT | Mod: 26 | Performed by: STUDENT IN AN ORGANIZED HEALTH CARE EDUCATION/TRAINING PROGRAM

## 2024-01-01 PROCEDURE — 84520 ASSAY OF UREA NITROGEN: CPT

## 2024-01-01 PROCEDURE — 93990 DOPPLER FLOW TESTING: CPT | Mod: 26 | Performed by: RADIOLOGY

## 2024-01-01 PROCEDURE — 84443 ASSAY THYROID STIM HORMONE: CPT

## 2024-01-01 PROCEDURE — 99233 SBSQ HOSP IP/OBS HIGH 50: CPT | Mod: 25 | Performed by: NURSE PRACTITIONER

## 2024-01-01 PROCEDURE — 258N000001 HC RX 258: Performed by: HOSPITALIST

## 2024-01-01 PROCEDURE — 99233 SBSQ HOSP IP/OBS HIGH 50: CPT | Mod: 24 | Performed by: NURSE PRACTITIONER

## 2024-01-01 PROCEDURE — 83735 ASSAY OF MAGNESIUM: CPT | Performed by: PHYSICIAN ASSISTANT

## 2024-01-01 PROCEDURE — 84450 TRANSFERASE (AST) (SGOT): CPT | Performed by: CLINICAL NURSE SPECIALIST

## 2024-01-01 PROCEDURE — 87305 ASPERGILLUS AG IA: CPT | Performed by: INTERNAL MEDICINE

## 2024-01-01 PROCEDURE — 99418 PROLNG IP/OBS E/M EA 15 MIN: CPT | Mod: FS | Performed by: STUDENT IN AN ORGANIZED HEALTH CARE EDUCATION/TRAINING PROGRAM

## 2024-01-01 PROCEDURE — 258N000003 HC RX IP 258 OP 636: Performed by: NURSE PRACTITIONER

## 2024-01-01 PROCEDURE — 82977 ASSAY OF GGT: CPT

## 2024-01-01 PROCEDURE — 87040 BLOOD CULTURE FOR BACTERIA: CPT

## 2024-01-01 PROCEDURE — 83036 HEMOGLOBIN GLYCOSYLATED A1C: CPT

## 2024-01-01 PROCEDURE — 36569 INSJ PICC 5 YR+ W/O IMAGING: CPT

## 2024-01-01 PROCEDURE — 90832 PSYTX W PT 30 MINUTES: CPT | Performed by: STUDENT IN AN ORGANIZED HEALTH CARE EDUCATION/TRAINING PROGRAM

## 2024-01-01 PROCEDURE — 85027 COMPLETE CBC AUTOMATED: CPT | Performed by: INTERNAL MEDICINE

## 2024-01-01 PROCEDURE — 87040 BLOOD CULTURE FOR BACTERIA: CPT | Performed by: STUDENT IN AN ORGANIZED HEALTH CARE EDUCATION/TRAINING PROGRAM

## 2024-01-01 PROCEDURE — 87102 FUNGUS ISOLATION CULTURE: CPT | Performed by: INTERNAL MEDICINE

## 2024-01-01 PROCEDURE — 85007 BL SMEAR W/DIFF WBC COUNT: CPT | Performed by: STUDENT IN AN ORGANIZED HEALTH CARE EDUCATION/TRAINING PROGRAM

## 2024-01-01 PROCEDURE — 31630 BRONCHOSCOPY DILATE/FX REPR: CPT | Mod: GC | Performed by: INTERNAL MEDICINE

## 2024-01-01 PROCEDURE — 99215 OFFICE O/P EST HI 40 MIN: CPT | Mod: 95 | Performed by: STUDENT IN AN ORGANIZED HEALTH CARE EDUCATION/TRAINING PROGRAM

## 2024-01-01 PROCEDURE — 370N000017 HC ANESTHESIA TECHNICAL FEE, PER MIN: Performed by: OTOLARYNGOLOGY

## 2024-01-01 PROCEDURE — 99233 SBSQ HOSP IP/OBS HIGH 50: CPT | Mod: FS | Performed by: NURSE PRACTITIONER

## 2024-01-01 PROCEDURE — 86832 HLA CLASS I HIGH DEFIN QUAL: CPT | Performed by: INTERNAL MEDICINE

## 2024-01-01 PROCEDURE — 80197 ASSAY OF TACROLIMUS: CPT | Performed by: INTERNAL MEDICINE

## 2024-01-01 PROCEDURE — 85610 PROTHROMBIN TIME: CPT | Performed by: INTERNAL MEDICINE

## 2024-01-01 PROCEDURE — 272N000001 HC OR GENERAL SUPPLY STERILE: Performed by: OTOLARYNGOLOGY

## 2024-01-01 PROCEDURE — 83690 ASSAY OF LIPASE: CPT

## 2024-01-01 PROCEDURE — 87102 FUNGUS ISOLATION CULTURE: CPT | Performed by: STUDENT IN AN ORGANIZED HEALTH CARE EDUCATION/TRAINING PROGRAM

## 2024-01-01 PROCEDURE — 250N000009 HC RX 250: Performed by: NURSE ANESTHETIST, CERTIFIED REGISTERED

## 2024-01-01 PROCEDURE — 86833 HLA CLASS II HIGH DEFIN QUAL: CPT | Performed by: NURSE PRACTITIONER

## 2024-01-01 PROCEDURE — 36415 COLL VENOUS BLD VENIPUNCTURE: CPT | Performed by: HOSPITALIST

## 2024-01-01 PROCEDURE — 97535 SELF CARE MNGMENT TRAINING: CPT | Mod: GO

## 2024-01-01 PROCEDURE — 99238 HOSP IP/OBS DSCHRG MGMT 30/<: CPT | Mod: GC | Performed by: INTERNAL MEDICINE

## 2024-01-01 PROCEDURE — 5A1D70Z PERFORMANCE OF URINARY FILTRATION, INTERMITTENT, LESS THAN 6 HOURS PER DAY: ICD-10-PCS | Performed by: CLINICAL NURSE SPECIALIST

## 2024-01-01 PROCEDURE — 36907 BALO ANGIOP CTR DIALYSIS SEG: CPT

## 2024-01-01 PROCEDURE — 0B9H8ZX DRAINAGE OF LUNG LINGULA, VIA NATURAL OR ARTIFICIAL OPENING ENDOSCOPIC, DIAGNOSTIC: ICD-10-PCS | Performed by: STUDENT IN AN ORGANIZED HEALTH CARE EDUCATION/TRAINING PROGRAM

## 2024-01-01 PROCEDURE — 250N000012 HC RX MED GY IP 250 OP 636 PS 637: Performed by: STUDENT IN AN ORGANIZED HEALTH CARE EDUCATION/TRAINING PROGRAM

## 2024-01-01 PROCEDURE — 86832 HLA CLASS I HIGH DEFIN QUAL: CPT | Performed by: NURSE PRACTITIONER

## 2024-01-01 PROCEDURE — 82306 VITAMIN D 25 HYDROXY: CPT | Performed by: PHYSICIAN ASSISTANT

## 2024-01-01 PROCEDURE — 82040 ASSAY OF SERUM ALBUMIN: CPT | Performed by: NURSE PRACTITIONER

## 2024-01-01 PROCEDURE — 87637 SARSCOV2&INF A&B&RSV AMP PRB: CPT

## 2024-01-01 PROCEDURE — 74018 RADEX ABDOMEN 1 VIEW: CPT

## 2024-01-01 PROCEDURE — 99232 SBSQ HOSP IP/OBS MODERATE 35: CPT | Performed by: FAMILY MEDICINE

## 2024-01-01 PROCEDURE — 99291 CRITICAL CARE FIRST HOUR: CPT | Mod: 25 | Performed by: STUDENT IN AN ORGANIZED HEALTH CARE EDUCATION/TRAINING PROGRAM

## 2024-01-01 PROCEDURE — 94799 UNLISTED PULMONARY SVC/PX: CPT

## 2024-01-01 PROCEDURE — 999N000254 HC STATISTIC VENTILATOR TRANSFER

## 2024-01-01 PROCEDURE — 31641 BRONCHOSCOPY TREAT BLOCKAGE: CPT | Mod: GC | Performed by: STUDENT IN AN ORGANIZED HEALTH CARE EDUCATION/TRAINING PROGRAM

## 2024-01-01 PROCEDURE — 97165 OT EVAL LOW COMPLEX 30 MIN: CPT | Mod: GO

## 2024-01-01 PROCEDURE — 80053 COMPREHEN METABOLIC PANEL: CPT

## 2024-01-01 PROCEDURE — 87637 SARSCOV2&INF A&B&RSV AMP PRB: CPT | Performed by: INTERNAL MEDICINE

## 2024-01-01 PROCEDURE — G0463 HOSPITAL OUTPT CLINIC VISIT: HCPCS | Mod: 25

## 2024-01-01 PROCEDURE — C1726 CATH, BAL DIL, NON-VASCULAR: HCPCS | Performed by: STUDENT IN AN ORGANIZED HEALTH CARE EDUCATION/TRAINING PROGRAM

## 2024-01-01 PROCEDURE — 82247 BILIRUBIN TOTAL: CPT | Performed by: CLINICAL NURSE SPECIALIST

## 2024-01-01 PROCEDURE — 710N000012 HC RECOVERY PHASE 2, PER MINUTE: Performed by: INTERNAL MEDICINE

## 2024-01-01 PROCEDURE — 87081 CULTURE SCREEN ONLY: CPT | Performed by: HOSPITALIST

## 2024-01-01 PROCEDURE — 82550 ASSAY OF CK (CPK): CPT | Performed by: STUDENT IN AN ORGANIZED HEALTH CARE EDUCATION/TRAINING PROGRAM

## 2024-01-01 PROCEDURE — 71250 CT THORAX DX C-: CPT | Mod: MG | Performed by: RADIOLOGY

## 2024-01-01 PROCEDURE — 99418 PROLNG IP/OBS E/M EA 15 MIN: CPT | Mod: AI | Performed by: HOSPITALIST

## 2024-01-01 PROCEDURE — 0B738DZ DILATION OF RIGHT MAIN BRONCHUS WITH INTRALUMINAL DEVICE, VIA NATURAL OR ARTIFICIAL OPENING ENDOSCOPIC: ICD-10-PCS | Performed by: STUDENT IN AN ORGANIZED HEALTH CARE EDUCATION/TRAINING PROGRAM

## 2024-01-01 PROCEDURE — 710N000011 HC RECOVERY PHASE 1, LEVEL 3, PER MIN: Performed by: INTERNAL MEDICINE

## 2024-01-01 PROCEDURE — 272N000450 HC KIT 4FR POWER PICC SINGLE LUMEN

## 2024-01-01 PROCEDURE — 83605 ASSAY OF LACTIC ACID: CPT | Performed by: INTERNAL MEDICINE

## 2024-01-01 PROCEDURE — 99223 1ST HOSP IP/OBS HIGH 75: CPT | Performed by: CLINICAL NURSE SPECIALIST

## 2024-01-01 PROCEDURE — 258N000003 HC RX IP 258 OP 636: Mod: JZ | Performed by: ANESTHESIOLOGY

## 2024-01-01 PROCEDURE — 370N000017 HC ANESTHESIA TECHNICAL FEE, PER MIN: Performed by: STUDENT IN AN ORGANIZED HEALTH CARE EDUCATION/TRAINING PROGRAM

## 2024-01-01 PROCEDURE — 87799 DETECT AGENT NOS DNA QUANT: CPT | Performed by: PHYSICIAN ASSISTANT

## 2024-01-01 PROCEDURE — 999N000285 HC STATISTIC VASC ACCESS LAB DRAW WITH PIV START

## 2024-01-01 PROCEDURE — 99223 1ST HOSP IP/OBS HIGH 75: CPT | Mod: GC | Performed by: STUDENT IN AN ORGANIZED HEALTH CARE EDUCATION/TRAINING PROGRAM

## 2024-01-01 PROCEDURE — 87799 DETECT AGENT NOS DNA QUANT: CPT

## 2024-01-01 PROCEDURE — 97162 PT EVAL MOD COMPLEX 30 MIN: CPT | Mod: GP

## 2024-01-01 PROCEDURE — 87205 SMEAR GRAM STAIN: CPT | Performed by: INTERNAL MEDICINE

## 2024-01-01 PROCEDURE — 999N000132 HC STATISTIC PP CARE STAGE 1

## 2024-01-01 PROCEDURE — 85049 AUTOMATED PLATELET COUNT: CPT | Performed by: STUDENT IN AN ORGANIZED HEALTH CARE EDUCATION/TRAINING PROGRAM

## 2024-01-01 PROCEDURE — 82805 BLOOD GASES W/O2 SATURATION: CPT | Performed by: STUDENT IN AN ORGANIZED HEALTH CARE EDUCATION/TRAINING PROGRAM

## 2024-01-01 PROCEDURE — 87799 DETECT AGENT NOS DNA QUANT: CPT | Performed by: NURSE PRACTITIONER

## 2024-01-01 PROCEDURE — 99152 MOD SED SAME PHYS/QHP 5/>YRS: CPT | Mod: GC | Performed by: RADIOLOGY

## 2024-01-01 PROCEDURE — 76705 ECHO EXAM OF ABDOMEN: CPT

## 2024-01-01 PROCEDURE — 94002 VENT MGMT INPAT INIT DAY: CPT

## 2024-01-01 PROCEDURE — 94002 VENT MGMT INPAT INIT DAY: CPT | Mod: ZZRT

## 2024-01-01 PROCEDURE — 87449 NOS EACH ORGANISM AG IA: CPT

## 2024-01-01 PROCEDURE — 250N000011 HC RX IP 250 OP 636: Mod: JZ | Performed by: ANESTHESIOLOGY

## 2024-01-01 PROCEDURE — 250N000009 HC RX 250: Performed by: ANESTHESIOLOGY

## 2024-01-01 PROCEDURE — 92610 EVALUATE SWALLOWING FUNCTION: CPT | Mod: GN | Performed by: SPEECH-LANGUAGE PATHOLOGIST

## 2024-01-01 PROCEDURE — 85027 COMPLETE CBC AUTOMATED: CPT | Performed by: PHYSICIAN ASSISTANT

## 2024-01-01 PROCEDURE — 272N000195 HC ACCESSORY CR4

## 2024-01-01 PROCEDURE — 87493 C DIFF AMPLIFIED PROBE: CPT

## 2024-01-01 PROCEDURE — 99232 SBSQ HOSP IP/OBS MODERATE 35: CPT | Mod: FS | Performed by: STUDENT IN AN ORGANIZED HEALTH CARE EDUCATION/TRAINING PROGRAM

## 2024-01-01 PROCEDURE — 99232 SBSQ HOSP IP/OBS MODERATE 35: CPT | Performed by: NURSE PRACTITIONER

## 2024-01-01 PROCEDURE — 5A1955Z RESPIRATORY VENTILATION, GREATER THAN 96 CONSECUTIVE HOURS: ICD-10-PCS | Performed by: STUDENT IN AN ORGANIZED HEALTH CARE EDUCATION/TRAINING PROGRAM

## 2024-01-01 PROCEDURE — 99221 1ST HOSP IP/OBS SF/LOW 40: CPT | Performed by: CLINICAL NURSE SPECIALIST

## 2024-01-01 PROCEDURE — 87210 SMEAR WET MOUNT SALINE/INK: CPT | Performed by: STUDENT IN AN ORGANIZED HEALTH CARE EDUCATION/TRAINING PROGRAM

## 2024-01-01 PROCEDURE — 258N000003 HC RX IP 258 OP 636: Mod: JZ | Performed by: INTERNAL MEDICINE

## 2024-01-01 PROCEDURE — 74183 MRI ABD W/O CNTR FLWD CNTR: CPT | Mod: 26 | Performed by: RADIOLOGY

## 2024-01-01 PROCEDURE — 94375 RESPIRATORY FLOW VOLUME LOOP: CPT | Performed by: PHYSICIAN ASSISTANT

## 2024-01-01 PROCEDURE — 84145 PROCALCITONIN (PCT): CPT | Performed by: INTERNAL MEDICINE

## 2024-01-01 PROCEDURE — 0B758DZ DILATION OF RIGHT MIDDLE LOBE BRONCHUS WITH INTRALUMINAL DEVICE, VIA NATURAL OR ARTIFICIAL OPENING ENDOSCOPIC: ICD-10-PCS | Performed by: STUDENT IN AN ORGANIZED HEALTH CARE EDUCATION/TRAINING PROGRAM

## 2024-01-01 PROCEDURE — 44500 INTRO GASTROINTESTINAL TUBE: CPT

## 2024-01-01 PROCEDURE — 85027 COMPLETE CBC AUTOMATED: CPT | Performed by: HOSPITALIST

## 2024-01-01 PROCEDURE — 999N000123 HC STATISTIC OXYGEN O2DAILY TECH TIME

## 2024-01-01 PROCEDURE — 80053 COMPREHEN METABOLIC PANEL: CPT | Performed by: CLINICAL NURSE SPECIALIST

## 2024-01-01 PROCEDURE — 86635 COCCIDIOIDES ANTIBODY: CPT

## 2024-01-01 PROCEDURE — 96372 THER/PROPH/DIAG INJ SC/IM: CPT | Performed by: PHYSICIAN ASSISTANT

## 2024-01-01 PROCEDURE — 71045 X-RAY EXAM CHEST 1 VIEW: CPT | Mod: 77

## 2024-01-01 PROCEDURE — 250N000009 HC RX 250: Performed by: OTOLARYNGOLOGY

## 2024-01-01 PROCEDURE — 49446 CHANGE G-TUBE TO G-J PERC: CPT | Mod: GC | Performed by: STUDENT IN AN ORGANIZED HEALTH CARE EDUCATION/TRAINING PROGRAM

## 2024-01-01 PROCEDURE — 31622 DX BRONCHOSCOPE/WASH: CPT | Mod: GC | Performed by: STUDENT IN AN ORGANIZED HEALTH CARE EDUCATION/TRAINING PROGRAM

## 2024-01-01 PROCEDURE — 0B9H8ZX DRAINAGE OF LUNG LINGULA, VIA NATURAL OR ARTIFICIAL OPENING ENDOSCOPIC, DIAGNOSTIC: ICD-10-PCS | Performed by: INTERNAL MEDICINE

## 2024-01-01 PROCEDURE — 80202 ASSAY OF VANCOMYCIN: CPT

## 2024-01-01 PROCEDURE — 634N000001 HC RX 634: Performed by: STUDENT IN AN ORGANIZED HEALTH CARE EDUCATION/TRAINING PROGRAM

## 2024-01-01 PROCEDURE — 80048 BASIC METABOLIC PNL TOTAL CA: CPT | Performed by: PHYSICIAN ASSISTANT

## 2024-01-01 PROCEDURE — 82803 BLOOD GASES ANY COMBINATION: CPT

## 2024-01-01 PROCEDURE — 87116 MYCOBACTERIA CULTURE: CPT | Performed by: STUDENT IN AN ORGANIZED HEALTH CARE EDUCATION/TRAINING PROGRAM

## 2024-01-01 PROCEDURE — C1725 CATH, TRANSLUMIN NON-LASER: HCPCS

## 2024-01-01 PROCEDURE — 85652 RBC SED RATE AUTOMATED: CPT | Performed by: INTERNAL MEDICINE

## 2024-01-01 PROCEDURE — 87086 URINE CULTURE/COLONY COUNT: CPT

## 2024-01-01 PROCEDURE — 87641 MR-STAPH DNA AMP PROBE: CPT

## 2024-01-01 PROCEDURE — 360N000083 HC SURGERY LEVEL 3 W/ FLUORO, PER MIN: Performed by: STUDENT IN AN ORGANIZED HEALTH CARE EDUCATION/TRAINING PROGRAM

## 2024-01-01 PROCEDURE — 99232 SBSQ HOSP IP/OBS MODERATE 35: CPT | Mod: 24 | Performed by: CLINICAL NURSE SPECIALIST

## 2024-01-01 PROCEDURE — 76937 US GUIDE VASCULAR ACCESS: CPT

## 2024-01-01 PROCEDURE — 272N000001 HC OR GENERAL SUPPLY STERILE: Performed by: STUDENT IN AN ORGANIZED HEALTH CARE EDUCATION/TRAINING PROGRAM

## 2024-01-01 PROCEDURE — 88108 CYTOPATH CONCENTRATE TECH: CPT | Mod: TC | Performed by: INTERNAL MEDICINE

## 2024-01-01 PROCEDURE — 87305 ASPERGILLUS AG IA: CPT

## 2024-01-01 PROCEDURE — 85610 PROTHROMBIN TIME: CPT | Performed by: PATHOLOGY

## 2024-01-01 PROCEDURE — 0D2DXUZ CHANGE FEEDING DEVICE IN LOWER INTESTINAL TRACT, EXTERNAL APPROACH: ICD-10-PCS | Performed by: RADIOLOGY

## 2024-01-01 PROCEDURE — 87340 HEPATITIS B SURFACE AG IA: CPT | Performed by: CLINICAL NURSE SPECIALIST

## 2024-01-01 PROCEDURE — 5A1D70Z PERFORMANCE OF URINARY FILTRATION, INTERMITTENT, LESS THAN 6 HOURS PER DAY: ICD-10-PCS | Performed by: PHYSICIAN ASSISTANT

## 2024-01-01 PROCEDURE — 99214 OFFICE O/P EST MOD 30 MIN: CPT | Mod: 95 | Performed by: STUDENT IN AN ORGANIZED HEALTH CARE EDUCATION/TRAINING PROGRAM

## 2024-01-01 PROCEDURE — 87798 DETECT AGENT NOS DNA AMP: CPT | Performed by: NURSE PRACTITIONER

## 2024-01-01 PROCEDURE — 258N000003 HC RX IP 258 OP 636: Mod: JZ | Performed by: STUDENT IN AN ORGANIZED HEALTH CARE EDUCATION/TRAINING PROGRAM

## 2024-01-01 PROCEDURE — 99207 PR NO BILLABLE SERVICE THIS VISIT: CPT | Performed by: PHYSICIAN ASSISTANT

## 2024-01-01 PROCEDURE — 36901 INTRO CATH DIALYSIS CIRCUIT: CPT | Mod: GC | Performed by: RADIOLOGY

## 2024-01-01 PROCEDURE — 272N000566 HC SHEATH CR3

## 2024-01-01 PROCEDURE — 999N000065 XR ABDOMEN PORT 1 VIEW

## 2024-01-01 PROCEDURE — 272N000504 HC NEEDLE CR4

## 2024-01-01 PROCEDURE — 87081 CULTURE SCREEN ONLY: CPT | Performed by: INTERNAL MEDICINE

## 2024-01-01 PROCEDURE — 999N000141 HC STATISTIC PRE-PROCEDURE NURSING ASSESSMENT: Performed by: INTERNAL MEDICINE

## 2024-01-01 PROCEDURE — 97535 SELF CARE MNGMENT TRAINING: CPT | Mod: GO | Performed by: OCCUPATIONAL THERAPIST

## 2024-01-01 PROCEDURE — 250N000025 HC SEVOFLURANE, PER MIN: Performed by: OTOLARYNGOLOGY

## 2024-01-01 PROCEDURE — 258N000003 HC RX IP 258 OP 636: Mod: JZ | Performed by: CLINICAL NURSE SPECIALIST

## 2024-01-01 PROCEDURE — 85060 BLOOD SMEAR INTERPRETATION: CPT | Performed by: STUDENT IN AN ORGANIZED HEALTH CARE EDUCATION/TRAINING PROGRAM

## 2024-01-01 PROCEDURE — 0B110F4 BYPASS TRACHEA TO CUTANEOUS WITH TRACHEOSTOMY DEVICE, OPEN APPROACH: ICD-10-PCS | Performed by: OTOLARYNGOLOGY

## 2024-01-01 PROCEDURE — 31645 BRNCHSC W/THER ASPIR 1ST: CPT | Mod: GC | Performed by: STUDENT IN AN ORGANIZED HEALTH CARE EDUCATION/TRAINING PROGRAM

## 2024-01-01 PROCEDURE — 83970 ASSAY OF PARATHORMONE: CPT | Performed by: PHYSICIAN ASSISTANT

## 2024-01-01 PROCEDURE — 84450 TRANSFERASE (AST) (SGOT): CPT

## 2024-01-01 PROCEDURE — 999N000142 HC STATISTIC PROCEDURE PREP ONLY

## 2024-01-01 PROCEDURE — 5A1D90Z PERFORMANCE OF URINARY FILTRATION, CONTINUOUS, GREATER THAN 18 HOURS PER DAY: ICD-10-PCS | Performed by: CLINICAL NURSE SPECIALIST

## 2024-01-01 PROCEDURE — 5A1935Z RESPIRATORY VENTILATION, LESS THAN 24 CONSECUTIVE HOURS: ICD-10-PCS | Performed by: NURSE PRACTITIONER

## 2024-01-01 PROCEDURE — 99285 EMERGENCY DEPT VISIT HI MDM: CPT | Mod: 25 | Performed by: INTERNAL MEDICINE

## 2024-01-01 PROCEDURE — 84155 ASSAY OF PROTEIN SERUM: CPT | Performed by: CLINICAL NURSE SPECIALIST

## 2024-01-01 PROCEDURE — 255N000002 HC RX 255 OP 636: Performed by: STUDENT IN AN ORGANIZED HEALTH CARE EDUCATION/TRAINING PROGRAM

## 2024-01-01 PROCEDURE — 76937 US GUIDE VASCULAR ACCESS: CPT | Mod: 26 | Performed by: RADIOLOGY

## 2024-01-01 PROCEDURE — 86140 C-REACTIVE PROTEIN: CPT | Performed by: INTERNAL MEDICINE

## 2024-01-01 PROCEDURE — 31624 DX BRONCHOSCOPE/LAVAGE: CPT | Performed by: ANESTHESIOLOGY

## 2024-01-01 PROCEDURE — 31624 DX BRONCHOSCOPE/LAVAGE: CPT | Mod: GC | Performed by: INTERNAL MEDICINE

## 2024-01-01 PROCEDURE — 85004 AUTOMATED DIFF WBC COUNT: CPT | Performed by: INTERNAL MEDICINE

## 2024-01-01 PROCEDURE — 92597 ORAL SPEECH DEVICE EVAL: CPT | Mod: GN | Performed by: SPEECH-LANGUAGE PATHOLOGIST

## 2024-01-01 PROCEDURE — 31636 BRONCHOSCOPY BRONCH STENTS: CPT | Mod: GC | Performed by: STUDENT IN AN ORGANIZED HEALTH CARE EDUCATION/TRAINING PROGRAM

## 2024-01-01 PROCEDURE — 85004 AUTOMATED DIFF WBC COUNT: CPT

## 2024-01-01 PROCEDURE — G0463 HOSPITAL OUTPT CLINIC VISIT: HCPCS | Performed by: PHYSICIAN ASSISTANT

## 2024-01-01 PROCEDURE — A9585 GADOBUTROL INJECTION: HCPCS | Performed by: INTERNAL MEDICINE

## 2024-01-01 PROCEDURE — 31624 DX BRONCHOSCOPE/LAVAGE: CPT | Performed by: INTERNAL MEDICINE

## 2024-01-01 PROCEDURE — C1726 CATH, BAL DIL, NON-VASCULAR: HCPCS | Performed by: INTERNAL MEDICINE

## 2024-01-01 PROCEDURE — 82805 BLOOD GASES W/O2 SATURATION: CPT | Performed by: PHYSICIAN ASSISTANT

## 2024-01-01 DEVICE — IMPLANTABLE DEVICE: Type: IMPLANTABLE DEVICE | Site: BRONCHUS | Status: FUNCTIONAL

## 2024-01-01 DEVICE — IMPLANTABLE DEVICE: Type: IMPLANTABLE DEVICE | Site: LUNG | Status: FUNCTIONAL

## 2024-01-01 RX ORDER — NALOXONE HYDROCHLORIDE 0.4 MG/ML
0.1 INJECTION, SOLUTION INTRAMUSCULAR; INTRAVENOUS; SUBCUTANEOUS
Status: DISCONTINUED | OUTPATIENT
Start: 2024-01-01 | End: 2024-01-01

## 2024-01-01 RX ORDER — FLUTICASONE FUROATE AND VILANTEROL 100; 25 UG/1; UG/1
1 POWDER RESPIRATORY (INHALATION) DAILY
Status: DISCONTINUED | OUTPATIENT
Start: 2024-01-01 | End: 2024-01-01

## 2024-01-01 RX ORDER — DIPHENHYDRAMINE HYDROCHLORIDE 50 MG/ML
25 INJECTION INTRAMUSCULAR; INTRAVENOUS ONCE
Qty: 1 ML | Refills: 0 | Status: COMPLETED | OUTPATIENT
Start: 2024-01-01 | End: 2024-01-01

## 2024-01-01 RX ORDER — PROPOFOL 10 MG/ML
5-75 INJECTION, EMULSION INTRAVENOUS CONTINUOUS
Status: DISCONTINUED | OUTPATIENT
Start: 2024-01-01 | End: 2024-01-01

## 2024-01-01 RX ORDER — FLUMAZENIL 0.1 MG/ML
0.2 INJECTION, SOLUTION INTRAVENOUS
Status: DISCONTINUED | OUTPATIENT
Start: 2024-01-01 | End: 2024-01-01

## 2024-01-01 RX ORDER — NALOXONE HYDROCHLORIDE 0.4 MG/ML
0.4 INJECTION, SOLUTION INTRAMUSCULAR; INTRAVENOUS; SUBCUTANEOUS
Status: DISCONTINUED | OUTPATIENT
Start: 2024-01-01 | End: 2024-01-01 | Stop reason: HOSPADM

## 2024-01-01 RX ORDER — NALOXONE HYDROCHLORIDE 0.4 MG/ML
0.2 INJECTION, SOLUTION INTRAMUSCULAR; INTRAVENOUS; SUBCUTANEOUS
Status: DISCONTINUED | OUTPATIENT
Start: 2024-01-01 | End: 2024-01-01 | Stop reason: HOSPADM

## 2024-01-01 RX ORDER — OLANZAPINE 10 MG/1
1.25 INJECTION, POWDER, LYOPHILIZED, FOR SOLUTION INTRAMUSCULAR 2 TIMES DAILY PRN
Status: DISCONTINUED | OUTPATIENT
Start: 2024-01-01 | End: 2024-01-01

## 2024-01-01 RX ORDER — PROCHLORPERAZINE 25 MG
25 SUPPOSITORY, RECTAL RECTAL EVERY 12 HOURS PRN
Status: DISCONTINUED | OUTPATIENT
Start: 2024-01-01 | End: 2024-01-01

## 2024-01-01 RX ORDER — LIDOCAINE 40 MG/G
CREAM TOPICAL
Status: DISCONTINUED | OUTPATIENT
Start: 2024-01-01 | End: 2024-01-01 | Stop reason: HOSPADM

## 2024-01-01 RX ORDER — CHLORHEXIDINE GLUCONATE ORAL RINSE 1.2 MG/ML
15 SOLUTION DENTAL EVERY 12 HOURS
Status: DISCONTINUED | OUTPATIENT
Start: 2024-01-01 | End: 2024-01-01 | Stop reason: HOSPADM

## 2024-01-01 RX ORDER — NALOXONE HYDROCHLORIDE 0.4 MG/ML
0.2 INJECTION, SOLUTION INTRAMUSCULAR; INTRAVENOUS; SUBCUTANEOUS
Status: DISCONTINUED | OUTPATIENT
Start: 2024-01-01 | End: 2024-01-01

## 2024-01-01 RX ORDER — VALGANCICLOVIR 450 MG/1
450 TABLET, FILM COATED ORAL
Qty: 15 TABLET | Refills: 1 | Status: SHIPPED | OUTPATIENT
Start: 2024-01-01 | End: 2024-01-01

## 2024-01-01 RX ORDER — SODIUM BICARBONATE TAB 650 MG 650 MG
325 TAB ORAL ONCE
Status: COMPLETED | OUTPATIENT
Start: 2024-01-01 | End: 2024-01-01

## 2024-01-01 RX ORDER — ACETAMINOPHEN 325 MG/1
975 TABLET ORAL EVERY 8 HOURS PRN
Status: DISCONTINUED | OUTPATIENT
Start: 2024-01-01 | End: 2024-01-01

## 2024-01-01 RX ORDER — SODIUM CHLORIDE FOR INHALATION 0.9 %
3 VIAL, NEBULIZER (ML) INHALATION 2 TIMES DAILY
Status: DISCONTINUED | OUTPATIENT
Start: 2024-01-01 | End: 2024-01-01

## 2024-01-01 RX ORDER — PANTOPRAZOLE SODIUM 40 MG/1
40 TABLET, DELAYED RELEASE ORAL
Status: DISCONTINUED | OUTPATIENT
Start: 2024-01-01 | End: 2024-01-01

## 2024-01-01 RX ORDER — LIDOCAINE HYDROCHLORIDE 20 MG/ML
5 SOLUTION OROPHARYNGEAL ONCE
Status: COMPLETED | OUTPATIENT
Start: 2024-01-01 | End: 2024-01-01

## 2024-01-01 RX ORDER — LORAZEPAM 2 MG/ML
1 INJECTION INTRAMUSCULAR ONCE
Status: COMPLETED | OUTPATIENT
Start: 2024-01-01 | End: 2024-01-01

## 2024-01-01 RX ORDER — PHENYLEPHRINE HCL IN 0.9% NACL 50MG/250ML
.5-1.25 PLASTIC BAG, INJECTION (ML) INTRAVENOUS CONTINUOUS
Status: DISCONTINUED | OUTPATIENT
Start: 2024-01-01 | End: 2024-01-01

## 2024-01-01 RX ORDER — AMINO ACIDS/PROTEIN HYDROLYS 11G-40/45
1 LIQUID IN PACKET (ML) ORAL DAILY
Status: CANCELLED | OUTPATIENT
Start: 2024-01-01

## 2024-01-01 RX ORDER — NICOTINE POLACRILEX 4 MG
15-30 LOZENGE BUCCAL
Status: CANCELLED | OUTPATIENT
Start: 2024-01-01

## 2024-01-01 RX ORDER — RAMELTEON 8 MG/1
8 TABLET ORAL AT BEDTIME
Status: CANCELLED | OUTPATIENT
Start: 2024-01-01

## 2024-01-01 RX ORDER — POLYETHYLENE GLYCOL 3350 17 G/17G
17 POWDER, FOR SOLUTION ORAL DAILY PRN
Status: DISCONTINUED | OUTPATIENT
Start: 2024-01-01 | End: 2024-01-01

## 2024-01-01 RX ORDER — ONDANSETRON 2 MG/ML
4 INJECTION INTRAMUSCULAR; INTRAVENOUS ONCE
Status: COMPLETED | OUTPATIENT
Start: 2024-01-01 | End: 2024-01-01

## 2024-01-01 RX ORDER — SODIUM CHLORIDE FOR INHALATION 3 %
3 VIAL, NEBULIZER (ML) INHALATION 2 TIMES DAILY
Status: DISCONTINUED | OUTPATIENT
Start: 2024-01-01 | End: 2024-01-01 | Stop reason: HOSPADM

## 2024-01-01 RX ORDER — HYDROMORPHONE HYDROCHLORIDE 1 MG/ML
0.3 INJECTION, SOLUTION INTRAMUSCULAR; INTRAVENOUS; SUBCUTANEOUS
Status: DISCONTINUED | OUTPATIENT
Start: 2024-01-01 | End: 2024-01-01

## 2024-01-01 RX ORDER — LEVALBUTEROL INHALATION SOLUTION 1.25 MG/3ML
1.25 SOLUTION RESPIRATORY (INHALATION) EVERY 6 HOURS PRN
Status: DISCONTINUED | OUTPATIENT
Start: 2024-01-01 | End: 2024-01-01

## 2024-01-01 RX ORDER — VITAMIN B COMPLEX
50 TABLET ORAL
Status: DISCONTINUED | OUTPATIENT
Start: 2024-01-01 | End: 2024-01-01 | Stop reason: HOSPADM

## 2024-01-01 RX ORDER — METOPROLOL TARTRATE 1 MG/ML
5 INJECTION, SOLUTION INTRAVENOUS
Status: COMPLETED | OUTPATIENT
Start: 2024-01-01 | End: 2024-01-01

## 2024-01-01 RX ORDER — PREDNISONE 5 MG/1
5 TABLET ORAL DAILY
Status: DISCONTINUED | OUTPATIENT
Start: 2024-01-01 | End: 2024-01-01 | Stop reason: HOSPADM

## 2024-01-01 RX ORDER — ACETAMINOPHEN 325 MG/1
650 TABLET ORAL EVERY 4 HOURS PRN
Status: DISCONTINUED | OUTPATIENT
Start: 2024-01-01 | End: 2024-01-01

## 2024-01-01 RX ORDER — DEXTROSE MONOHYDRATE 25 G/50ML
25-50 INJECTION, SOLUTION INTRAVENOUS
Status: DISCONTINUED | OUTPATIENT
Start: 2024-01-01 | End: 2024-01-01

## 2024-01-01 RX ORDER — PREDNISONE 50 MG/1
50 TABLET ORAL DAILY
Status: DISCONTINUED | OUTPATIENT
Start: 2024-01-01 | End: 2024-01-01

## 2024-01-01 RX ORDER — POSACONAZOLE 100 MG/1
300 TABLET, DELAYED RELEASE ORAL DAILY
Status: DISCONTINUED | OUTPATIENT
Start: 2024-01-01 | End: 2024-01-01 | Stop reason: HOSPADM

## 2024-01-01 RX ORDER — LORAZEPAM 2 MG/ML
INJECTION INTRAMUSCULAR
Status: COMPLETED
Start: 2024-01-01 | End: 2024-01-01

## 2024-01-01 RX ORDER — LEVALBUTEROL INHALATION SOLUTION 0.63 MG/3ML
0.63 SOLUTION RESPIRATORY (INHALATION) 4 TIMES DAILY
Status: DISCONTINUED | OUTPATIENT
Start: 2024-01-01 | End: 2024-01-01

## 2024-01-01 RX ORDER — LIDOCAINE 40 MG/G
CREAM TOPICAL
Status: CANCELLED | OUTPATIENT
Start: 2024-01-01

## 2024-01-01 RX ORDER — FENTANYL CITRATE 50 UG/ML
25-50 INJECTION, SOLUTION INTRAMUSCULAR; INTRAVENOUS EVERY 5 MIN PRN
Status: DISCONTINUED | OUTPATIENT
Start: 2024-01-01 | End: 2024-01-01

## 2024-01-01 RX ORDER — CALCIUM CHLORIDE, MAGNESIUM CHLORIDE, SODIUM CHLORIDE, SODIUM BICARBONATE, POTASSIUM CHLORIDE AND SODIUM PHOSPHATE DIBASIC DIHYDRATE 3.68; 3.05; 6.34; 3.09; .314; .187 G/L; G/L; G/L; G/L; G/L; G/L
12.5 INJECTION INTRAVENOUS CONTINUOUS
Status: DISCONTINUED | OUTPATIENT
Start: 2024-01-01 | End: 2024-01-01

## 2024-01-01 RX ORDER — POTASSIUM CHLORIDE 1.5 G/1.58G
20 POWDER, FOR SOLUTION ORAL ONCE
Status: COMPLETED | OUTPATIENT
Start: 2024-01-01 | End: 2024-01-01

## 2024-01-01 RX ORDER — NICOTINE POLACRILEX 4 MG
15-30 LOZENGE BUCCAL
Status: DISCONTINUED | OUTPATIENT
Start: 2024-01-01 | End: 2024-01-01 | Stop reason: HOSPADM

## 2024-01-01 RX ORDER — PREDNISONE 50 MG/1
50 TABLET ORAL DAILY
Status: DISCONTINUED | OUTPATIENT
Start: 2024-01-01 | End: 2024-01-01 | Stop reason: HOSPADM

## 2024-01-01 RX ORDER — AMOXICILLIN 250 MG
1 CAPSULE ORAL 2 TIMES DAILY
Status: DISCONTINUED | OUTPATIENT
Start: 2024-01-01 | End: 2024-01-01

## 2024-01-01 RX ORDER — VALGANCICLOVIR HYDROCHLORIDE 50 MG/ML
450 POWDER, FOR SOLUTION ORAL
Status: DISCONTINUED | OUTPATIENT
Start: 2024-01-01 | End: 2024-01-01 | Stop reason: HOSPADM

## 2024-01-01 RX ORDER — LINEZOLID 600 MG/1
600 TABLET, FILM COATED ORAL EVERY 12 HOURS
Status: COMPLETED | OUTPATIENT
Start: 2024-01-01 | End: 2024-01-01

## 2024-01-01 RX ORDER — ACETAMINOPHEN 325 MG/1
650 TABLET ORAL EVERY 6 HOURS PRN
Status: DISCONTINUED | OUTPATIENT
Start: 2024-01-01 | End: 2024-01-01

## 2024-01-01 RX ORDER — SULFAMETHOXAZOLE AND TRIMETHOPRIM 400; 80 MG/1; MG/1
1 TABLET ORAL DAILY
Status: DISCONTINUED | OUTPATIENT
Start: 2024-01-01 | End: 2024-01-01

## 2024-01-01 RX ORDER — CARBOXYMETHYLCELLULOSE SODIUM 5 MG/ML
1 SOLUTION/ DROPS OPHTHALMIC
Status: DISCONTINUED | OUTPATIENT
Start: 2024-01-01 | End: 2024-01-01 | Stop reason: HOSPADM

## 2024-01-01 RX ORDER — VITAMIN B COMPLEX
50 TABLET ORAL
Status: CANCELLED | OUTPATIENT
Start: 2024-01-01

## 2024-01-01 RX ORDER — IOPAMIDOL 408 MG/ML
INJECTION, SOLUTION INTRAVASCULAR PRN
Status: DISCONTINUED | OUTPATIENT
Start: 2024-01-01 | End: 2024-01-01 | Stop reason: HOSPADM

## 2024-01-01 RX ORDER — POTASSIUM CHLORIDE 1.5 G/1.58G
20 POWDER, FOR SOLUTION ORAL 2 TIMES DAILY
Status: DISCONTINUED | OUTPATIENT
Start: 2024-01-01 | End: 2024-01-01

## 2024-01-01 RX ORDER — HEPARIN SODIUM 5000 [USP'U]/.5ML
5000 INJECTION, SOLUTION INTRAVENOUS; SUBCUTANEOUS EVERY 8 HOURS
Status: DISCONTINUED | OUTPATIENT
Start: 2024-01-01 | End: 2024-01-01

## 2024-01-01 RX ORDER — FENTANYL CITRATE 50 UG/ML
50 INJECTION, SOLUTION INTRAMUSCULAR; INTRAVENOUS
Status: DISCONTINUED | OUTPATIENT
Start: 2024-01-01 | End: 2024-01-01

## 2024-01-01 RX ORDER — NOREPINEPHRINE BITARTRATE 0.06 MG/ML
.01-.6 INJECTION, SOLUTION INTRAVENOUS CONTINUOUS
Status: DISCONTINUED | OUTPATIENT
Start: 2024-01-01 | End: 2024-01-01

## 2024-01-01 RX ORDER — ONDANSETRON 2 MG/ML
4 INJECTION INTRAMUSCULAR; INTRAVENOUS
Status: DISCONTINUED | OUTPATIENT
Start: 2024-01-01 | End: 2024-01-01 | Stop reason: HOSPADM

## 2024-01-01 RX ORDER — MIDODRINE HYDROCHLORIDE 5 MG/1
5 TABLET ORAL
Status: DISCONTINUED | OUTPATIENT
Start: 2024-01-01 | End: 2024-01-01

## 2024-01-01 RX ORDER — VITAMIN B COMPLEX
50 TABLET ORAL
Status: DISCONTINUED | OUTPATIENT
Start: 2024-01-01 | End: 2024-01-01

## 2024-01-01 RX ORDER — ONDANSETRON 2 MG/ML
4 INJECTION INTRAMUSCULAR; INTRAVENOUS EVERY 6 HOURS PRN
Status: DISCONTINUED | OUTPATIENT
Start: 2024-01-01 | End: 2024-01-01 | Stop reason: HOSPADM

## 2024-01-01 RX ORDER — AMOXICILLIN 875 MG
875 TABLET ORAL 2 TIMES DAILY
Qty: 60 TABLET | Refills: 0 | Status: SHIPPED | OUTPATIENT
Start: 2024-01-01 | End: 2024-01-01

## 2024-01-01 RX ORDER — AMINO ACIDS/PROTEIN HYDROLYS 11G-40/45
1 LIQUID IN PACKET (ML) ORAL DAILY
Status: DISCONTINUED | OUTPATIENT
Start: 2024-01-01 | End: 2024-01-01 | Stop reason: HOSPADM

## 2024-01-01 RX ORDER — QUETIAPINE FUMARATE 25 MG/1
25 TABLET, FILM COATED ORAL AT BEDTIME
Status: DISCONTINUED | OUTPATIENT
Start: 2024-01-01 | End: 2024-01-01

## 2024-01-01 RX ORDER — QUETIAPINE FUMARATE 25 MG/1
25 TABLET, FILM COATED ORAL AT BEDTIME
Status: DISCONTINUED | OUTPATIENT
Start: 2024-01-01 | End: 2024-01-01 | Stop reason: HOSPADM

## 2024-01-01 RX ORDER — VIT B COMP NO.3/FOLIC/C/BIOTIN 1 MG-60 MG
1 TABLET ORAL DAILY
Status: CANCELLED | OUTPATIENT
Start: 2024-01-01

## 2024-01-01 RX ORDER — BENZONATATE 100 MG/1
100 CAPSULE ORAL EVERY 4 HOURS PRN
Status: DISCONTINUED | OUTPATIENT
Start: 2024-01-01 | End: 2024-01-01

## 2024-01-01 RX ORDER — CARBOXYMETHYLCELLULOSE SODIUM 5 MG/ML
1 SOLUTION/ DROPS OPHTHALMIC
Status: DISCONTINUED | OUTPATIENT
Start: 2024-01-01 | End: 2024-01-01

## 2024-01-01 RX ORDER — POTASSIUM CHLORIDE 7.45 MG/ML
10 INJECTION INTRAVENOUS ONCE
Status: COMPLETED | OUTPATIENT
Start: 2024-01-01 | End: 2024-01-01

## 2024-01-01 RX ORDER — DEXTROSE MONOHYDRATE 100 MG/ML
INJECTION, SOLUTION INTRAVENOUS CONTINUOUS PRN
Status: CANCELLED | OUTPATIENT
Start: 2024-01-01

## 2024-01-01 RX ORDER — LIDOCAINE HYDROCHLORIDE 10 MG/ML
INJECTION, SOLUTION EPIDURAL; INFILTRATION; INTRACAUDAL; PERINEURAL
Status: COMPLETED
Start: 2024-01-01 | End: 2024-01-01

## 2024-01-01 RX ORDER — SODIUM CHLORIDE FOR INHALATION 3 %
3 VIAL, NEBULIZER (ML) INHALATION 2 TIMES DAILY
Status: CANCELLED | OUTPATIENT
Start: 2024-01-01

## 2024-01-01 RX ORDER — POTASSIUM CHLORIDE 20MEQ/15ML
20 LIQUID (ML) ORAL ONCE
Status: COMPLETED | OUTPATIENT
Start: 2024-01-01 | End: 2024-01-01

## 2024-01-01 RX ORDER — HYDROMORPHONE HYDROCHLORIDE 1 MG/ML
0.2 INJECTION, SOLUTION INTRAMUSCULAR; INTRAVENOUS; SUBCUTANEOUS ONCE
Status: COMPLETED | OUTPATIENT
Start: 2024-01-01 | End: 2024-01-01

## 2024-01-01 RX ORDER — HEPARIN SODIUM 1000 [USP'U]/ML
500 INJECTION, SOLUTION INTRAVENOUS; SUBCUTANEOUS CONTINUOUS
Status: DISCONTINUED | OUTPATIENT
Start: 2024-01-01 | End: 2024-01-01

## 2024-01-01 RX ORDER — LORAZEPAM 2 MG/ML
0.5 INJECTION INTRAMUSCULAR
Status: CANCELLED | OUTPATIENT
Start: 2024-01-01

## 2024-01-01 RX ORDER — MONTELUKAST SODIUM 10 MG/1
10 TABLET ORAL AT BEDTIME
Status: CANCELLED | OUTPATIENT
Start: 2024-01-01

## 2024-01-01 RX ORDER — POLYETHYLENE GLYCOL 3350 17 G/17G
17 POWDER, FOR SOLUTION ORAL DAILY
Status: DISCONTINUED | OUTPATIENT
Start: 2024-01-01 | End: 2024-01-01

## 2024-01-01 RX ORDER — LOPERAMIDE HCL 2 MG
2 CAPSULE ORAL 4 TIMES DAILY PRN
Status: DISCONTINUED | OUTPATIENT
Start: 2024-01-01 | End: 2024-01-01 | Stop reason: HOSPADM

## 2024-01-01 RX ORDER — AMOXICILLIN 250 MG
1 CAPSULE ORAL DAILY PRN
Status: DISCONTINUED | OUTPATIENT
Start: 2024-01-01 | End: 2024-01-01

## 2024-01-01 RX ORDER — QUETIAPINE FUMARATE 50 MG/1
50 TABLET, FILM COATED ORAL 2 TIMES DAILY
Status: DISCONTINUED | OUTPATIENT
Start: 2024-01-01 | End: 2024-01-01

## 2024-01-01 RX ORDER — QUETIAPINE FUMARATE 50 MG/1
150 TABLET, FILM COATED ORAL AT BEDTIME
Status: DISCONTINUED | OUTPATIENT
Start: 2024-01-01 | End: 2024-01-01

## 2024-01-01 RX ORDER — RAMELTEON 8 MG/1
8 TABLET ORAL AT BEDTIME
Status: DISCONTINUED | OUTPATIENT
Start: 2024-01-01 | End: 2024-01-01

## 2024-01-01 RX ORDER — SULFAMETHOXAZOLE AND TRIMETHOPRIM 400; 80 MG/1; MG/1
1 TABLET ORAL
Status: CANCELLED | OUTPATIENT
Start: 2024-01-01

## 2024-01-01 RX ORDER — ONDANSETRON 2 MG/ML
INJECTION INTRAMUSCULAR; INTRAVENOUS PRN
Status: DISCONTINUED | OUTPATIENT
Start: 2024-01-01 | End: 2024-01-01

## 2024-01-01 RX ORDER — MONTELUKAST SODIUM 10 MG/1
10 TABLET ORAL AT BEDTIME
Status: DISCONTINUED | OUTPATIENT
Start: 2024-01-01 | End: 2024-01-01

## 2024-01-01 RX ORDER — AMLODIPINE BESYLATE 2.5 MG/1
2.5 TABLET ORAL DAILY
Status: DISCONTINUED | OUTPATIENT
Start: 2024-01-01 | End: 2024-01-01

## 2024-01-01 RX ORDER — EPHEDRINE SULFATE 50 MG/ML
INJECTION, SOLUTION INTRAMUSCULAR; INTRAVENOUS; SUBCUTANEOUS PRN
Status: DISCONTINUED | OUTPATIENT
Start: 2024-01-01 | End: 2024-01-01

## 2024-01-01 RX ORDER — PREDNISOLONE 5 MG/1
45 TABLET ORAL DAILY
Status: DISCONTINUED | OUTPATIENT
Start: 2024-01-01 | End: 2024-01-01

## 2024-01-01 RX ORDER — LEVALBUTEROL INHALATION SOLUTION 0.63 MG/3ML
0.63 SOLUTION RESPIRATORY (INHALATION) 4 TIMES DAILY
Status: DISCONTINUED | OUTPATIENT
Start: 2024-01-01 | End: 2024-01-01 | Stop reason: HOSPADM

## 2024-01-01 RX ORDER — HEPARIN SODIUM 5000 [USP'U]/.5ML
5000 INJECTION, SOLUTION INTRAVENOUS; SUBCUTANEOUS EVERY 8 HOURS
Status: DISCONTINUED | OUTPATIENT
Start: 2024-01-01 | End: 2024-01-01 | Stop reason: HOSPADM

## 2024-01-01 RX ORDER — AMINO ACIDS/PROTEIN HYDROLYS 11G-40/45
1 LIQUID IN PACKET (ML) ORAL DAILY
Status: DISCONTINUED | OUTPATIENT
Start: 2024-01-01 | End: 2024-01-01

## 2024-01-01 RX ORDER — SODIUM CHLORIDE 9 MG/ML
INJECTION, SOLUTION INTRAVENOUS CONTINUOUS
Status: DISCONTINUED | OUTPATIENT
Start: 2024-01-01 | End: 2024-01-01 | Stop reason: HOSPADM

## 2024-01-01 RX ORDER — METOPROLOL TARTRATE 1 MG/ML
5 INJECTION, SOLUTION INTRAVENOUS ONCE
Status: COMPLETED | OUTPATIENT
Start: 2024-01-01 | End: 2024-01-01

## 2024-01-01 RX ORDER — HYDROMORPHONE HCL IN WATER/PF 6 MG/30 ML
0.4 PATIENT CONTROLLED ANALGESIA SYRINGE INTRAVENOUS EVERY 5 MIN PRN
Status: DISCONTINUED | OUTPATIENT
Start: 2024-01-01 | End: 2024-01-01 | Stop reason: HOSPADM

## 2024-01-01 RX ORDER — FENTANYL CITRATE 50 UG/ML
INJECTION, SOLUTION INTRAMUSCULAR; INTRAVENOUS PRN
Status: DISCONTINUED | OUTPATIENT
Start: 2024-01-01 | End: 2024-01-01

## 2024-01-01 RX ORDER — OLANZAPINE 10 MG/1
2.5 INJECTION, POWDER, LYOPHILIZED, FOR SOLUTION INTRAMUSCULAR ONCE
Status: COMPLETED | OUTPATIENT
Start: 2024-01-01 | End: 2024-01-01

## 2024-01-01 RX ORDER — NALOXONE HYDROCHLORIDE 0.4 MG/ML
0.4 INJECTION, SOLUTION INTRAMUSCULAR; INTRAVENOUS; SUBCUTANEOUS
Status: DISCONTINUED | OUTPATIENT
Start: 2024-01-01 | End: 2024-01-01

## 2024-01-01 RX ORDER — CHLORHEXIDINE GLUCONATE ORAL RINSE 1.2 MG/ML
15 SOLUTION DENTAL EVERY 12 HOURS
Status: CANCELLED | OUTPATIENT
Start: 2024-01-01

## 2024-01-01 RX ORDER — ACETAMINOPHEN 325 MG/1
975 TABLET ORAL EVERY 8 HOURS
Status: DISCONTINUED | OUTPATIENT
Start: 2024-01-01 | End: 2024-01-01

## 2024-01-01 RX ORDER — DEXTROSE MONOHYDRATE 25 G/50ML
25-50 INJECTION, SOLUTION INTRAVENOUS
Status: DISCONTINUED | OUTPATIENT
Start: 2024-01-01 | End: 2024-01-01 | Stop reason: HOSPADM

## 2024-01-01 RX ORDER — PROPOFOL 10 MG/ML
INJECTION, EMULSION INTRAVENOUS CONTINUOUS PRN
Status: DISCONTINUED | OUTPATIENT
Start: 2024-01-01 | End: 2024-01-01

## 2024-01-01 RX ORDER — ONDANSETRON 4 MG/1
4 TABLET, FILM COATED ORAL EVERY 6 HOURS PRN
Status: DISCONTINUED | OUTPATIENT
Start: 2024-01-01 | End: 2024-01-01

## 2024-01-01 RX ORDER — POSACONAZOLE 100 MG/1
300 TABLET, DELAYED RELEASE ORAL DAILY
Status: DISCONTINUED | OUTPATIENT
Start: 2024-01-01 | End: 2024-01-01

## 2024-01-01 RX ORDER — HEPARIN SODIUM 1000 [USP'U]/ML
500 INJECTION, SOLUTION INTRAVENOUS; SUBCUTANEOUS CONTINUOUS
Status: DISCONTINUED | OUTPATIENT
Start: 2024-01-01 | End: 2024-01-01 | Stop reason: HOSPADM

## 2024-01-01 RX ORDER — MIDODRINE HYDROCHLORIDE 5 MG/1
15 TABLET ORAL
Status: DISCONTINUED | OUTPATIENT
Start: 2024-01-01 | End: 2024-01-01

## 2024-01-01 RX ORDER — FENTANYL CITRATE 50 UG/ML
50 INJECTION, SOLUTION INTRAMUSCULAR; INTRAVENOUS EVERY 5 MIN PRN
Status: DISCONTINUED | OUTPATIENT
Start: 2024-01-01 | End: 2024-01-01 | Stop reason: HOSPADM

## 2024-01-01 RX ORDER — FENTANYL CITRATE 50 UG/ML
25 INJECTION, SOLUTION INTRAMUSCULAR; INTRAVENOUS EVERY 5 MIN PRN
Status: DISCONTINUED | OUTPATIENT
Start: 2024-01-01 | End: 2024-01-01

## 2024-01-01 RX ORDER — VIT B COMP NO.3/FOLIC/C/BIOTIN 1 MG-60 MG
1 TABLET ORAL DAILY
Status: DISCONTINUED | OUTPATIENT
Start: 2024-01-01 | End: 2024-01-01 | Stop reason: HOSPADM

## 2024-01-01 RX ORDER — QUETIAPINE FUMARATE 50 MG/1
50 TABLET, FILM COATED ORAL AT BEDTIME
Status: DISCONTINUED | OUTPATIENT
Start: 2024-01-01 | End: 2024-01-01

## 2024-01-01 RX ORDER — DEXMEDETOMIDINE HYDROCHLORIDE 4 UG/ML
.1-1.2 INJECTION, SOLUTION INTRAVENOUS CONTINUOUS
Status: DISCONTINUED | OUTPATIENT
Start: 2024-01-01 | End: 2024-01-01

## 2024-01-01 RX ORDER — QUETIAPINE FUMARATE 25 MG/1
25 TABLET, FILM COATED ORAL 2 TIMES DAILY
Status: DISCONTINUED | OUTPATIENT
Start: 2024-01-01 | End: 2024-01-01

## 2024-01-01 RX ORDER — ACETYLCYSTEINE 200 MG/ML
2 SOLUTION ORAL; RESPIRATORY (INHALATION) 2 TIMES DAILY
Status: DISCONTINUED | OUTPATIENT
Start: 2024-01-01 | End: 2024-01-01

## 2024-01-01 RX ORDER — DEXTROSE MONOHYDRATE 100 MG/ML
INJECTION, SOLUTION INTRAVENOUS CONTINUOUS PRN
Status: DISCONTINUED | OUTPATIENT
Start: 2024-01-01 | End: 2024-01-01

## 2024-01-01 RX ORDER — QUETIAPINE FUMARATE 50 MG/1
100 TABLET, FILM COATED ORAL AT BEDTIME
Status: DISCONTINUED | OUTPATIENT
Start: 2024-01-01 | End: 2024-01-01

## 2024-01-01 RX ORDER — POSACONAZOLE 100 MG/1
300 TABLET, DELAYED RELEASE ORAL DAILY
Qty: 90 TABLET | Refills: 11 | Status: SHIPPED | OUTPATIENT
Start: 2024-01-01

## 2024-01-01 RX ORDER — METHOCARBAMOL 750 MG/1
750 TABLET, FILM COATED ORAL ONCE
Status: DISCONTINUED | OUTPATIENT
Start: 2024-01-01 | End: 2024-01-01

## 2024-01-01 RX ORDER — ONDANSETRON 4 MG/1
4 TABLET, FILM COATED ORAL ONCE
Status: DISCONTINUED | OUTPATIENT
Start: 2024-01-01 | End: 2024-01-01

## 2024-01-01 RX ORDER — ESCITALOPRAM OXALATE 5 MG/5ML
10 SOLUTION ORAL DAILY
Status: DISCONTINUED | OUTPATIENT
Start: 2024-01-01 | End: 2024-01-01

## 2024-01-01 RX ORDER — AMOXICILLIN 250 MG
1 CAPSULE ORAL 2 TIMES DAILY PRN
Status: DISCONTINUED | OUTPATIENT
Start: 2024-01-01 | End: 2024-01-01

## 2024-01-01 RX ORDER — VASOPRESSIN IN 0.9 % NACL 2 UNIT/2ML
SYRINGE (ML) INTRAVENOUS PRN
Status: DISCONTINUED | OUTPATIENT
Start: 2024-01-01 | End: 2024-01-01

## 2024-01-01 RX ORDER — BISACODYL 10 MG
10 SUPPOSITORY, RECTAL RECTAL
Status: DISCONTINUED | OUTPATIENT
Start: 2024-08-05 | End: 2024-01-01 | Stop reason: HOSPADM

## 2024-01-01 RX ORDER — LORAZEPAM 1 MG/1
1 TABLET ORAL EVERY 4 HOURS PRN
Status: DISCONTINUED | OUTPATIENT
Start: 2024-01-01 | End: 2024-01-01

## 2024-01-01 RX ORDER — CALCIUM CARBONATE 500 MG/1
500 TABLET, CHEWABLE ORAL
Status: DISCONTINUED | OUTPATIENT
Start: 2024-01-01 | End: 2024-01-01

## 2024-01-01 RX ORDER — CALCIUM CHLORIDE, MAGNESIUM CHLORIDE, SODIUM CHLORIDE, SODIUM BICARBONATE, POTASSIUM CHLORIDE AND SODIUM PHOSPHATE DIBASIC DIHYDRATE 3.68; 3.05; 6.34; 3.09; .314; .187 G/L; G/L; G/L; G/L; G/L; G/L
INJECTION INTRAVENOUS CONTINUOUS
Status: DISCONTINUED | OUTPATIENT
Start: 2024-01-01 | End: 2024-01-01

## 2024-01-01 RX ORDER — POTASSIUM CHLORIDE 20MEQ/15ML
40 LIQUID (ML) ORAL ONCE
Status: COMPLETED | OUTPATIENT
Start: 2024-01-01 | End: 2024-01-01

## 2024-01-01 RX ORDER — NICOTINE POLACRILEX 4 MG
15-30 LOZENGE BUCCAL
Status: DISCONTINUED | OUTPATIENT
Start: 2024-01-01 | End: 2024-01-01

## 2024-01-01 RX ORDER — POTASSIUM CHLORIDE 29.8 MG/ML
20 INJECTION INTRAVENOUS EVERY 8 HOURS PRN
Status: DISCONTINUED | OUTPATIENT
Start: 2024-01-01 | End: 2024-01-01

## 2024-01-01 RX ORDER — HYDROMORPHONE HYDROCHLORIDE 1 MG/ML
0.5 INJECTION, SOLUTION INTRAMUSCULAR; INTRAVENOUS; SUBCUTANEOUS
Status: DISCONTINUED | OUTPATIENT
Start: 2024-01-01 | End: 2024-01-01 | Stop reason: HOSPADM

## 2024-01-01 RX ORDER — LORAZEPAM 2 MG/ML
1 CONCENTRATE ORAL
Status: DISCONTINUED | OUTPATIENT
Start: 2024-01-01 | End: 2024-01-01

## 2024-01-01 RX ORDER — HEPARIN SODIUM 200 [USP'U]/100ML
1 INJECTION, SOLUTION INTRAVENOUS CONTINUOUS PRN
Status: DISCONTINUED | OUTPATIENT
Start: 2024-01-01 | End: 2024-01-01 | Stop reason: HOSPADM

## 2024-01-01 RX ORDER — VIT B COMP NO.3/FOLIC/C/BIOTIN 1 MG-60 MG
1 TABLET ORAL DAILY
Status: DISCONTINUED | OUTPATIENT
Start: 2024-01-01 | End: 2024-01-01

## 2024-01-01 RX ORDER — AZITHROMYCIN 250 MG/1
125 TABLET, FILM COATED ORAL DAILY
Status: CANCELLED | OUTPATIENT
Start: 2024-01-01

## 2024-01-01 RX ORDER — ACETYLCYSTEINE 100 MG/ML
4 SOLUTION ORAL; RESPIRATORY (INHALATION) 3 TIMES DAILY PRN
Status: DISCONTINUED | OUTPATIENT
Start: 2024-01-01 | End: 2024-01-01

## 2024-01-01 RX ORDER — CHOLECALCIFEROL (VITAMIN D3) 1250 MCG
1250 CAPSULE ORAL
Status: DISCONTINUED | OUTPATIENT
Start: 2024-01-01 | End: 2024-01-01

## 2024-01-01 RX ORDER — CARBOXYMETHYLCELLULOSE SODIUM 5 MG/ML
1 SOLUTION/ DROPS OPHTHALMIC
Status: CANCELLED | OUTPATIENT
Start: 2024-01-01

## 2024-01-01 RX ORDER — HEPARIN SODIUM 1000 [USP'U]/ML
500 INJECTION, SOLUTION INTRAVENOUS; SUBCUTANEOUS CONTINUOUS
Status: CANCELLED | OUTPATIENT
Start: 2024-01-01

## 2024-01-01 RX ORDER — SULFAMETHOXAZOLE AND TRIMETHOPRIM 400; 80 MG/1; MG/1
1 TABLET ORAL
Status: DISCONTINUED | OUTPATIENT
Start: 2024-01-01 | End: 2024-01-01 | Stop reason: HOSPADM

## 2024-01-01 RX ORDER — FENTANYL CITRATE 50 UG/ML
25 INJECTION, SOLUTION INTRAMUSCULAR; INTRAVENOUS
Status: DISCONTINUED | OUTPATIENT
Start: 2024-01-01 | End: 2024-01-01

## 2024-01-01 RX ORDER — ACETAMINOPHEN 650 MG/1
650 SUPPOSITORY RECTAL EVERY 4 HOURS PRN
Status: DISCONTINUED | OUTPATIENT
Start: 2024-01-01 | End: 2024-01-01

## 2024-01-01 RX ORDER — MIDODRINE HYDROCHLORIDE 5 MG/1
10 TABLET ORAL
Status: DISCONTINUED | OUTPATIENT
Start: 2024-01-01 | End: 2024-01-01

## 2024-01-01 RX ORDER — PROPOFOL 10 MG/ML
INJECTION, EMULSION INTRAVENOUS PRN
Status: DISCONTINUED | OUTPATIENT
Start: 2024-01-01 | End: 2024-01-01

## 2024-01-01 RX ORDER — CALCIUM GLUCONATE 20 MG/ML
2 INJECTION, SOLUTION INTRAVENOUS EVERY 8 HOURS PRN
Status: DISCONTINUED | OUTPATIENT
Start: 2024-01-01 | End: 2024-01-01

## 2024-01-01 RX ORDER — IODIXANOL 320 MG/ML
50 INJECTION, SOLUTION INTRAVASCULAR ONCE
Status: COMPLETED | OUTPATIENT
Start: 2024-01-01 | End: 2024-01-01

## 2024-01-01 RX ORDER — SODIUM CHLORIDE, SODIUM LACTATE, POTASSIUM CHLORIDE, CALCIUM CHLORIDE 600; 310; 30; 20 MG/100ML; MG/100ML; MG/100ML; MG/100ML
INJECTION, SOLUTION INTRAVENOUS CONTINUOUS PRN
Status: DISCONTINUED | OUTPATIENT
Start: 2024-01-01 | End: 2024-01-01

## 2024-01-01 RX ORDER — AZITHROMYCIN 200 MG/5ML
125 POWDER, FOR SUSPENSION ORAL DAILY
Status: DISCONTINUED | OUTPATIENT
Start: 2024-01-01 | End: 2024-01-01

## 2024-01-01 RX ORDER — LEVOFLOXACIN 5 MG/ML
500 INJECTION, SOLUTION INTRAVENOUS EVERY 24 HOURS
Status: DISCONTINUED | OUTPATIENT
Start: 2024-01-01 | End: 2024-01-01

## 2024-01-01 RX ORDER — LORAZEPAM 2 MG/ML
1 INJECTION INTRAMUSCULAR
Status: COMPLETED | OUTPATIENT
Start: 2024-01-01 | End: 2024-01-01

## 2024-01-01 RX ORDER — LINEZOLID 2 MG/ML
600 INJECTION, SOLUTION INTRAVENOUS EVERY 12 HOURS
Status: DISCONTINUED | OUTPATIENT
Start: 2024-01-01 | End: 2024-01-01

## 2024-01-01 RX ORDER — DEXTROSE MONOHYDRATE 100 MG/ML
INJECTION, SOLUTION INTRAVENOUS CONTINUOUS PRN
Status: DISCONTINUED | OUTPATIENT
Start: 2024-01-01 | End: 2024-01-01 | Stop reason: HOSPADM

## 2024-01-01 RX ORDER — CHLORHEXIDINE GLUCONATE ORAL RINSE 1.2 MG/ML
15 SOLUTION DENTAL EVERY 12 HOURS
Status: DISCONTINUED | OUTPATIENT
Start: 2024-01-01 | End: 2024-01-01

## 2024-01-01 RX ORDER — LIDOCAINE HYDROCHLORIDE 20 MG/ML
INJECTION, SOLUTION INFILTRATION; PERINEURAL PRN
Status: DISCONTINUED | OUTPATIENT
Start: 2024-01-01 | End: 2024-01-01

## 2024-01-01 RX ORDER — ACETAMINOPHEN 325 MG/1
650 TABLET ORAL EVERY 6 HOURS PRN
Status: CANCELLED | OUTPATIENT
Start: 2024-01-01

## 2024-01-01 RX ORDER — FLUMAZENIL 0.1 MG/ML
0.2 INJECTION, SOLUTION INTRAVENOUS
Status: DISCONTINUED | OUTPATIENT
Start: 2024-01-01 | End: 2024-01-01 | Stop reason: HOSPADM

## 2024-01-01 RX ORDER — AMOXICILLIN 250 MG
2 CAPSULE ORAL 2 TIMES DAILY PRN
Status: DISCONTINUED | OUTPATIENT
Start: 2024-01-01 | End: 2024-01-01 | Stop reason: HOSPADM

## 2024-01-01 RX ORDER — SODIUM CHLORIDE FOR INHALATION 3 %
3 VIAL, NEBULIZER (ML) INHALATION EVERY 6 HOURS PRN
Status: DISCONTINUED | OUTPATIENT
Start: 2024-01-01 | End: 2024-01-01

## 2024-01-01 RX ORDER — POLYETHYLENE GLYCOL 3350 17 G/17G
17 POWDER, FOR SOLUTION ORAL DAILY
Status: DISCONTINUED | OUTPATIENT
Start: 2024-01-01 | End: 2024-01-01 | Stop reason: HOSPADM

## 2024-01-01 RX ORDER — ONDANSETRON 4 MG/1
4 TABLET, FILM COATED ORAL
Status: COMPLETED | OUTPATIENT
Start: 2024-01-01 | End: 2024-01-01

## 2024-01-01 RX ORDER — QUETIAPINE FUMARATE 25 MG/1
25 TABLET, FILM COATED ORAL ONCE
Status: COMPLETED | OUTPATIENT
Start: 2024-01-01 | End: 2024-01-01

## 2024-01-01 RX ORDER — AZITHROMYCIN 250 MG/1
125 TABLET, FILM COATED ORAL DAILY
Status: DISCONTINUED | OUTPATIENT
Start: 2024-01-01 | End: 2024-01-01 | Stop reason: HOSPADM

## 2024-01-01 RX ORDER — SODIUM CHLORIDE, SODIUM LACTATE, POTASSIUM CHLORIDE, CALCIUM CHLORIDE 600; 310; 30; 20 MG/100ML; MG/100ML; MG/100ML; MG/100ML
INJECTION, SOLUTION INTRAVENOUS CONTINUOUS
Status: DISCONTINUED | OUTPATIENT
Start: 2024-01-01 | End: 2024-01-01 | Stop reason: HOSPADM

## 2024-01-01 RX ORDER — METOPROLOL TARTRATE 25 MG/1
25 TABLET, FILM COATED ORAL 2 TIMES DAILY
Status: DISCONTINUED | OUTPATIENT
Start: 2024-01-01 | End: 2024-01-01 | Stop reason: HOSPADM

## 2024-01-01 RX ORDER — ACETAMINOPHEN 325 MG/1
650 TABLET ORAL EVERY 6 HOURS PRN
Status: DISCONTINUED | OUTPATIENT
Start: 2024-01-01 | End: 2024-01-01 | Stop reason: HOSPADM

## 2024-01-01 RX ORDER — HYDROMORPHONE HCL IN WATER/PF 6 MG/30 ML
0.2 PATIENT CONTROLLED ANALGESIA SYRINGE INTRAVENOUS EVERY 5 MIN PRN
Status: DISCONTINUED | OUTPATIENT
Start: 2024-01-01 | End: 2024-01-01

## 2024-01-01 RX ORDER — VALGANCICLOVIR HYDROCHLORIDE 50 MG/ML
450 POWDER, FOR SOLUTION ORAL
Status: DISCONTINUED | OUTPATIENT
Start: 2024-01-01 | End: 2024-01-01

## 2024-01-01 RX ORDER — LORAZEPAM 2 MG/ML
0.5 CONCENTRATE ORAL EVERY 6 HOURS SCHEDULED
Status: DISCONTINUED | OUTPATIENT
Start: 2024-01-01 | End: 2024-01-01

## 2024-01-01 RX ORDER — ACETYLCYSTEINE 100 MG/ML
4 SOLUTION ORAL; RESPIRATORY (INHALATION) 4 TIMES DAILY
Status: DISCONTINUED | OUTPATIENT
Start: 2024-01-01 | End: 2024-01-01

## 2024-01-01 RX ORDER — ONDANSETRON 2 MG/ML
4 INJECTION INTRAMUSCULAR; INTRAVENOUS EVERY 30 MIN PRN
Status: DISCONTINUED | OUTPATIENT
Start: 2024-01-01 | End: 2024-01-01 | Stop reason: HOSPADM

## 2024-01-01 RX ORDER — ACETYLCYSTEINE 100 MG/ML
4 SOLUTION ORAL; RESPIRATORY (INHALATION) 2 TIMES DAILY
Status: DISCONTINUED | OUTPATIENT
Start: 2024-01-01 | End: 2024-01-01 | Stop reason: HOSPADM

## 2024-01-01 RX ORDER — MAGNESIUM SULFATE HEPTAHYDRATE 40 MG/ML
2 INJECTION, SOLUTION INTRAVENOUS EVERY 8 HOURS PRN
Status: DISCONTINUED | OUTPATIENT
Start: 2024-01-01 | End: 2024-01-01

## 2024-01-01 RX ORDER — POTASSIUM CHLORIDE 20MEQ/15ML
60 LIQUID (ML) ORAL ONCE
Status: COMPLETED | OUTPATIENT
Start: 2024-01-01 | End: 2024-01-01

## 2024-01-01 RX ORDER — POTASSIUM CHLORIDE 1.5 G/1.58G
40 POWDER, FOR SOLUTION ORAL 2 TIMES DAILY
Status: DISCONTINUED | OUTPATIENT
Start: 2024-01-01 | End: 2024-01-01

## 2024-01-01 RX ORDER — PREDNISONE 50 MG/1
50 TABLET ORAL DAILY
Status: CANCELLED | OUTPATIENT
Start: 2024-01-01

## 2024-01-01 RX ORDER — TRAZODONE HYDROCHLORIDE 50 MG/1
50 TABLET, FILM COATED ORAL AT BEDTIME
Status: DISCONTINUED | OUTPATIENT
Start: 2024-01-01 | End: 2024-01-01

## 2024-01-01 RX ORDER — SULFAMETHOXAZOLE AND TRIMETHOPRIM 400; 80 MG/1; MG/1
1 TABLET ORAL
Status: DISCONTINUED | OUTPATIENT
Start: 2024-01-01 | End: 2024-01-01

## 2024-01-01 RX ORDER — SODIUM CHLORIDE FOR INHALATION 3 %
3 VIAL, NEBULIZER (ML) INHALATION
Status: DISCONTINUED | OUTPATIENT
Start: 2024-01-01 | End: 2024-01-01

## 2024-01-01 RX ORDER — DEXTROSE MONOHYDRATE 25 G/50ML
25-50 INJECTION, SOLUTION INTRAVENOUS
Status: CANCELLED | OUTPATIENT
Start: 2024-01-01

## 2024-01-01 RX ORDER — LORAZEPAM 1 MG/1
1 TABLET ORAL EVERY 4 HOURS PRN
Status: CANCELLED | OUTPATIENT
Start: 2024-01-01

## 2024-01-01 RX ORDER — ONDANSETRON 2 MG/ML
4 INJECTION INTRAMUSCULAR; INTRAVENOUS EVERY 6 HOURS PRN
Status: CANCELLED | OUTPATIENT
Start: 2024-01-01

## 2024-01-01 RX ORDER — SODIUM CHLORIDE, SODIUM LACTATE, POTASSIUM CHLORIDE, CALCIUM CHLORIDE 600; 310; 30; 20 MG/100ML; MG/100ML; MG/100ML; MG/100ML
INJECTION, SOLUTION INTRAVENOUS CONTINUOUS
Status: DISCONTINUED | OUTPATIENT
Start: 2024-01-01 | End: 2024-01-01

## 2024-01-01 RX ORDER — AZITHROMYCIN 250 MG/1
125 TABLET, FILM COATED ORAL DAILY
Status: DISCONTINUED | OUTPATIENT
Start: 2024-01-01 | End: 2024-01-01

## 2024-01-01 RX ORDER — ONDANSETRON 4 MG/1
4 TABLET, ORALLY DISINTEGRATING ORAL EVERY 30 MIN PRN
Status: DISCONTINUED | OUTPATIENT
Start: 2024-01-01 | End: 2024-01-01 | Stop reason: HOSPADM

## 2024-01-01 RX ORDER — PROPOFOL 10 MG/ML
INJECTION, EMULSION INTRAVENOUS
Status: COMPLETED
Start: 2024-01-01 | End: 2024-01-01

## 2024-01-01 RX ORDER — QUETIAPINE FUMARATE 25 MG/1
25 TABLET, FILM COATED ORAL
Status: DISCONTINUED | OUTPATIENT
Start: 2024-01-01 | End: 2024-01-01

## 2024-01-01 RX ORDER — OXYCODONE HCL 5 MG/5 ML
5 SOLUTION, ORAL ORAL
Status: COMPLETED | OUTPATIENT
Start: 2024-01-01 | End: 2024-01-01

## 2024-01-01 RX ORDER — LOPERAMIDE HCL 2 MG
2 CAPSULE ORAL 4 TIMES DAILY PRN
Status: CANCELLED | OUTPATIENT
Start: 2024-01-01

## 2024-01-01 RX ORDER — QUETIAPINE FUMARATE 25 MG/1
25 TABLET, FILM COATED ORAL AT BEDTIME
Status: CANCELLED | OUTPATIENT
Start: 2024-01-01

## 2024-01-01 RX ORDER — PREDNISONE 5 MG/1
5 TABLET ORAL DAILY
Status: CANCELLED | OUTPATIENT
Start: 2024-01-01

## 2024-01-01 RX ORDER — LIDOCAINE HYDROCHLORIDE 20 MG/ML
JELLY TOPICAL ONCE
Status: COMPLETED | OUTPATIENT
Start: 2024-01-01 | End: 2024-01-01

## 2024-01-01 RX ORDER — LORAZEPAM 1 MG/1
1 TABLET ORAL EVERY 4 HOURS PRN
Status: DISCONTINUED | OUTPATIENT
Start: 2024-01-01 | End: 2024-01-01 | Stop reason: HOSPADM

## 2024-01-01 RX ORDER — FENTANYL CITRATE 50 UG/ML
25-50 INJECTION, SOLUTION INTRAMUSCULAR; INTRAVENOUS EVERY 5 MIN PRN
Status: DISCONTINUED | OUTPATIENT
Start: 2024-01-01 | End: 2024-01-01 | Stop reason: HOSPADM

## 2024-01-01 RX ORDER — GUAR GUM
1 PACKET (EA) ORAL DAILY
Status: DISCONTINUED | OUTPATIENT
Start: 2024-01-01 | End: 2024-01-01

## 2024-01-01 RX ORDER — GADOBUTROL 604.72 MG/ML
7.5 INJECTION INTRAVENOUS ONCE
Status: COMPLETED | OUTPATIENT
Start: 2024-01-01 | End: 2024-01-01

## 2024-01-01 RX ORDER — OLANZAPINE 2.5 MG/1
5 TABLET, FILM COATED ORAL ONCE
Status: COMPLETED | OUTPATIENT
Start: 2024-01-01 | End: 2024-01-01

## 2024-01-01 RX ORDER — FENTANYL CITRATE 50 UG/ML
100 INJECTION, SOLUTION INTRAMUSCULAR; INTRAVENOUS
Status: COMPLETED | OUTPATIENT
Start: 2024-01-01 | End: 2024-01-01

## 2024-01-01 RX ORDER — PREDNISONE 20 MG/1
40 TABLET ORAL DAILY
Status: DISCONTINUED | OUTPATIENT
Start: 2024-01-01 | End: 2024-01-01

## 2024-01-01 RX ORDER — VIT B COMP NO.3/FOLIC/C/BIOTIN 1 MG-60 MG
1 TABLET ORAL
COMMUNITY

## 2024-01-01 RX ORDER — AMOXICILLIN 250 MG
2 CAPSULE ORAL 2 TIMES DAILY PRN
Status: DISCONTINUED | OUTPATIENT
Start: 2024-01-01 | End: 2024-01-01

## 2024-01-01 RX ORDER — FENTANYL CITRATE 50 UG/ML
25 INJECTION, SOLUTION INTRAMUSCULAR; INTRAVENOUS EVERY 5 MIN PRN
Status: DISCONTINUED | OUTPATIENT
Start: 2024-01-01 | End: 2024-01-01 | Stop reason: HOSPADM

## 2024-01-01 RX ORDER — QUETIAPINE FUMARATE 25 MG/1
25 TABLET, FILM COATED ORAL 2 TIMES DAILY PRN
Status: DISCONTINUED | OUTPATIENT
Start: 2024-01-01 | End: 2024-01-01

## 2024-01-01 RX ORDER — POSACONAZOLE 100 MG/1
300 TABLET, DELAYED RELEASE ORAL EVERY MORNING
Status: CANCELLED | OUTPATIENT
Start: 2024-01-01

## 2024-01-01 RX ORDER — LORAZEPAM 2 MG/ML
0.5 INJECTION INTRAMUSCULAR
Status: COMPLETED | OUTPATIENT
Start: 2024-01-01 | End: 2024-01-01

## 2024-01-01 RX ORDER — AMINO ACIDS/PROTEIN HYDROLYS 11G-40/45
1 LIQUID IN PACKET (ML) ORAL 2 TIMES DAILY
Status: DISCONTINUED | OUTPATIENT
Start: 2024-01-01 | End: 2024-01-01

## 2024-01-01 RX ORDER — LORAZEPAM 2 MG/ML
.25-.5 INJECTION INTRAMUSCULAR EVERY 4 HOURS PRN
Status: DISCONTINUED | OUTPATIENT
Start: 2024-01-01 | End: 2024-01-01

## 2024-01-01 RX ORDER — MIDODRINE HYDROCHLORIDE 5 MG/1
15 TABLET ORAL ONCE
Status: COMPLETED | OUTPATIENT
Start: 2024-01-01 | End: 2024-01-01

## 2024-01-01 RX ORDER — LORAZEPAM 1 MG/1
1 TABLET ORAL EVERY 4 HOURS
Status: COMPLETED | OUTPATIENT
Start: 2024-01-01 | End: 2024-01-01

## 2024-01-01 RX ORDER — RAMELTEON 8 MG/1
8 TABLET ORAL AT BEDTIME
Status: DISCONTINUED | OUTPATIENT
Start: 2024-01-01 | End: 2024-01-01 | Stop reason: HOSPADM

## 2024-01-01 RX ORDER — HEPARIN SODIUM 5000 [USP'U]/.5ML
5000 INJECTION, SOLUTION INTRAVENOUS; SUBCUTANEOUS EVERY 8 HOURS
Status: CANCELLED | OUTPATIENT
Start: 2024-01-01

## 2024-01-01 RX ORDER — PHENYLEPHRINE HCL IN 0.9% NACL 50MG/250ML
.1-6 PLASTIC BAG, INJECTION (ML) INTRAVENOUS CONTINUOUS
Status: DISCONTINUED | OUTPATIENT
Start: 2024-01-01 | End: 2024-01-01

## 2024-01-01 RX ORDER — AMOXICILLIN 250 MG
1 CAPSULE ORAL 2 TIMES DAILY PRN
Status: DISCONTINUED | OUTPATIENT
Start: 2024-01-01 | End: 2024-01-01 | Stop reason: HOSPADM

## 2024-01-01 RX ORDER — AZITHROMYCIN 250 MG/1
250 TABLET, FILM COATED ORAL DAILY
Status: DISCONTINUED | OUTPATIENT
Start: 2024-01-01 | End: 2024-01-01

## 2024-01-01 RX ORDER — FILGRASTIM 300 UG/ML
300 INJECTION, SOLUTION INTRAVENOUS; SUBCUTANEOUS ONCE
Qty: 1 ML | Refills: 0 | Status: SHIPPED | OUTPATIENT
Start: 2024-01-01 | End: 2024-01-01

## 2024-01-01 RX ORDER — PIPERACILLIN SODIUM, TAZOBACTAM SODIUM 2; .25 G/10ML; G/10ML
2.25 INJECTION, POWDER, LYOPHILIZED, FOR SOLUTION INTRAVENOUS ONCE
Status: COMPLETED | OUTPATIENT
Start: 2024-01-01 | End: 2024-01-01

## 2024-01-01 RX ORDER — QUETIAPINE FUMARATE 25 MG/1
25 TABLET, FILM COATED ORAL EVERY 6 HOURS PRN
Status: DISCONTINUED | OUTPATIENT
Start: 2024-01-01 | End: 2024-01-01

## 2024-01-01 RX ORDER — OXYCODONE HYDROCHLORIDE 5 MG/1
5 TABLET ORAL
Status: DISCONTINUED | OUTPATIENT
Start: 2024-01-01 | End: 2024-01-01 | Stop reason: HOSPADM

## 2024-01-01 RX ORDER — CEFAZOLIN SODIUM 2 G/100ML
2 INJECTION, SOLUTION INTRAVENOUS
Status: COMPLETED | OUTPATIENT
Start: 2024-01-01 | End: 2024-01-01

## 2024-01-01 RX ORDER — SENNOSIDES 8.6 MG
1 TABLET ORAL 2 TIMES DAILY PRN
Status: DISCONTINUED | OUTPATIENT
Start: 2024-01-01 | End: 2024-01-01 | Stop reason: HOSPADM

## 2024-01-01 RX ORDER — AMOXICILLIN 250 MG
2 CAPSULE ORAL 2 TIMES DAILY PRN
Status: CANCELLED | OUTPATIENT
Start: 2024-01-01

## 2024-01-01 RX ORDER — OXYCODONE HCL 20 MG/ML
5 CONCENTRATE, ORAL ORAL EVERY 4 HOURS PRN
Status: DISCONTINUED | OUTPATIENT
Start: 2024-01-01 | End: 2024-01-01

## 2024-01-01 RX ORDER — METOPROLOL TARTRATE 25 MG/1
25 TABLET, FILM COATED ORAL 2 TIMES DAILY
Status: CANCELLED | OUTPATIENT
Start: 2024-01-01

## 2024-01-01 RX ORDER — ACETYLCYSTEINE 100 MG/ML
4 SOLUTION ORAL; RESPIRATORY (INHALATION)
Status: DISCONTINUED | OUTPATIENT
Start: 2024-01-01 | End: 2024-01-01 | Stop reason: HOSPADM

## 2024-01-01 RX ORDER — FENTANYL CITRATE 50 UG/ML
50 INJECTION, SOLUTION INTRAMUSCULAR; INTRAVENOUS EVERY 5 MIN PRN
Status: DISCONTINUED | OUTPATIENT
Start: 2024-01-01 | End: 2024-01-01

## 2024-01-01 RX ORDER — QUETIAPINE FUMARATE 50 MG/1
50 TABLET, FILM COATED ORAL
Status: DISCONTINUED | OUTPATIENT
Start: 2024-01-01 | End: 2024-01-01

## 2024-01-01 RX ORDER — HYDROMORPHONE HCL IN WATER/PF 6 MG/30 ML
0.4 PATIENT CONTROLLED ANALGESIA SYRINGE INTRAVENOUS EVERY 5 MIN PRN
Status: DISCONTINUED | OUTPATIENT
Start: 2024-01-01 | End: 2024-01-01

## 2024-01-01 RX ORDER — LOPERAMIDE HCL 2 MG
2 CAPSULE ORAL 4 TIMES DAILY PRN
Status: DISCONTINUED | OUTPATIENT
Start: 2024-01-01 | End: 2024-01-01

## 2024-01-01 RX ORDER — LORAZEPAM 2 MG/ML
1 INJECTION INTRAMUSCULAR
Status: DISCONTINUED | OUTPATIENT
Start: 2024-01-01 | End: 2024-01-01 | Stop reason: HOSPADM

## 2024-01-01 RX ORDER — AMOXICILLIN 875 MG
875 TABLET ORAL 2 TIMES DAILY
Qty: 60 TABLET | Refills: 1 | Status: SHIPPED | OUTPATIENT
Start: 2024-01-01

## 2024-01-01 RX ORDER — CALCIUM CARBONATE/VITAMIN D3 500-10/5ML
400 LIQUID (ML) ORAL
COMMUNITY

## 2024-01-01 RX ORDER — LORAZEPAM 2 MG/ML
1 CONCENTRATE ORAL EVERY 4 HOURS PRN
Status: DISCONTINUED | OUTPATIENT
Start: 2024-01-01 | End: 2024-01-01

## 2024-01-01 RX ORDER — POSACONAZOLE 100 MG/1
300 TABLET, DELAYED RELEASE ORAL DAILY
Status: CANCELLED | OUTPATIENT
Start: 2024-01-01

## 2024-01-01 RX ORDER — HALOPERIDOL 2 MG/ML
5 SOLUTION ORAL EVERY 4 HOURS PRN
Status: DISCONTINUED | OUTPATIENT
Start: 2024-01-01 | End: 2024-01-01 | Stop reason: HOSPADM

## 2024-01-01 RX ORDER — ACETAMINOPHEN 325 MG/1
325 TABLET ORAL EVERY 4 HOURS
Status: DISCONTINUED | OUTPATIENT
Start: 2024-01-01 | End: 2024-01-01

## 2024-01-01 RX ORDER — MONTELUKAST SODIUM 10 MG/1
10 TABLET ORAL AT BEDTIME
Status: DISCONTINUED | OUTPATIENT
Start: 2024-01-01 | End: 2024-01-01 | Stop reason: HOSPADM

## 2024-01-01 RX ORDER — ALBUTEROL SULFATE 0.83 MG/ML
2.5 SOLUTION RESPIRATORY (INHALATION) 4 TIMES DAILY
Status: DISCONTINUED | OUTPATIENT
Start: 2024-01-01 | End: 2024-01-01

## 2024-01-01 RX ORDER — ACETYLCYSTEINE 100 MG/ML
4 SOLUTION ORAL; RESPIRATORY (INHALATION) EVERY 12 HOURS
Status: DISCONTINUED | OUTPATIENT
Start: 2024-01-01 | End: 2024-01-01

## 2024-01-01 RX ORDER — LEVALBUTEROL INHALATION SOLUTION 0.63 MG/3ML
0.63 SOLUTION RESPIRATORY (INHALATION) 4 TIMES DAILY
Status: CANCELLED | OUTPATIENT
Start: 2024-01-01

## 2024-01-01 RX ORDER — POLYETHYLENE GLYCOL 3350 17 G/17G
17 POWDER, FOR SOLUTION ORAL DAILY
Status: CANCELLED | OUTPATIENT
Start: 2024-01-01

## 2024-01-01 RX ORDER — PIPERACILLIN SODIUM, TAZOBACTAM SODIUM 4; .5 G/20ML; G/20ML
4.5 INJECTION, POWDER, LYOPHILIZED, FOR SOLUTION INTRAVENOUS EVERY 6 HOURS
Status: DISCONTINUED | OUTPATIENT
Start: 2024-01-01 | End: 2024-01-01

## 2024-01-01 RX ORDER — METOPROLOL TARTRATE 25 MG/1
25 TABLET, FILM COATED ORAL 2 TIMES DAILY
Start: 2024-01-01

## 2024-01-01 RX ORDER — ACETAMINOPHEN 325 MG/1
325 TABLET ORAL EVERY 4 HOURS PRN
Status: DISCONTINUED | OUTPATIENT
Start: 2024-01-01 | End: 2024-01-01

## 2024-01-01 RX ORDER — LIDOCAINE HYDROCHLORIDE AND EPINEPHRINE 10; 10 MG/ML; UG/ML
INJECTION, SOLUTION INFILTRATION; PERINEURAL PRN
Status: DISCONTINUED | OUTPATIENT
Start: 2024-01-01 | End: 2024-01-01 | Stop reason: HOSPADM

## 2024-01-01 RX ORDER — FENTANYL CITRATE 50 UG/ML
25 INJECTION, SOLUTION INTRAMUSCULAR; INTRAVENOUS ONCE
Status: COMPLETED | OUTPATIENT
Start: 2024-01-01 | End: 2024-01-01

## 2024-01-01 RX ORDER — PIPERACILLIN SODIUM, TAZOBACTAM SODIUM 2; .25 G/10ML; G/10ML
2.25 INJECTION, POWDER, LYOPHILIZED, FOR SOLUTION INTRAVENOUS EVERY 6 HOURS
Status: DISCONTINUED | OUTPATIENT
Start: 2024-01-01 | End: 2024-01-01

## 2024-01-01 RX ORDER — LEVALBUTEROL INHALATION SOLUTION 0.63 MG/3ML
0.63 SOLUTION RESPIRATORY (INHALATION) EVERY 6 HOURS PRN
Status: DISCONTINUED | OUTPATIENT
Start: 2024-01-01 | End: 2024-01-01

## 2024-01-01 RX ORDER — MEROPENEM 1 G/1
1 INJECTION, POWDER, FOR SOLUTION INTRAVENOUS EVERY 12 HOURS
Status: DISCONTINUED | OUTPATIENT
Start: 2024-01-01 | End: 2024-01-01

## 2024-01-01 RX ORDER — LANOLIN ALCOHOL/MO/W.PET/CERES
3 CREAM (GRAM) TOPICAL AT BEDTIME
Status: DISCONTINUED | OUTPATIENT
Start: 2024-01-01 | End: 2024-01-01

## 2024-01-01 RX ORDER — MIDODRINE HYDROCHLORIDE 5 MG/1
20 TABLET ORAL
Status: DISCONTINUED | OUTPATIENT
Start: 2024-01-01 | End: 2024-01-01

## 2024-01-01 RX ORDER — LORAZEPAM 1 MG/1
1 TABLET ORAL EVERY 4 HOURS
Status: CANCELLED | OUTPATIENT
Start: 2024-01-01 | End: 2024-01-01

## 2024-01-01 RX ORDER — VALGANCICLOVIR HYDROCHLORIDE 50 MG/ML
450 POWDER, FOR SOLUTION ORAL
Status: CANCELLED | OUTPATIENT
Start: 2024-01-01

## 2024-01-01 RX ORDER — LORAZEPAM 2 MG/ML
1 CONCENTRATE ORAL EVERY 6 HOURS SCHEDULED
Status: DISCONTINUED | OUTPATIENT
Start: 2024-01-01 | End: 2024-01-01 | Stop reason: HOSPADM

## 2024-01-01 RX ORDER — POSACONAZOLE 100 MG/1
300 TABLET, DELAYED RELEASE ORAL EVERY MORNING
Status: DISCONTINUED | OUTPATIENT
Start: 2024-01-01 | End: 2024-01-01

## 2024-01-01 RX ORDER — OXYCODONE HYDROCHLORIDE 5 MG/1
5 TABLET ORAL EVERY 6 HOURS
Status: DISCONTINUED | OUTPATIENT
Start: 2024-01-01 | End: 2024-01-01

## 2024-01-01 RX ORDER — ONDANSETRON 2 MG/ML
4 INJECTION INTRAMUSCULAR; INTRAVENOUS EVERY 6 HOURS PRN
Status: DISCONTINUED | OUTPATIENT
Start: 2024-01-01 | End: 2024-01-01

## 2024-01-01 RX ORDER — FLUDROCORTISONE ACETATE 0.1 MG/1
0.1 TABLET ORAL DAILY
Status: DISCONTINUED | OUTPATIENT
Start: 2024-01-01 | End: 2024-01-01

## 2024-01-01 RX ORDER — ALBUTEROL SULFATE 0.83 MG/ML
2.5 SOLUTION RESPIRATORY (INHALATION) EVERY 12 HOURS
Status: DISCONTINUED | OUTPATIENT
Start: 2024-01-01 | End: 2024-01-01

## 2024-01-01 RX ORDER — ACETYLCYSTEINE 100 MG/ML
4 SOLUTION ORAL; RESPIRATORY (INHALATION) 2 TIMES DAILY
Status: CANCELLED | OUTPATIENT
Start: 2024-01-01

## 2024-01-01 RX ORDER — PANTOPRAZOLE SODIUM 40 MG/1
40 TABLET, DELAYED RELEASE ORAL
Qty: 30 TABLET | Refills: 11 | Status: SHIPPED | OUTPATIENT
Start: 2024-01-01

## 2024-01-01 RX ORDER — OXYCODONE HYDROCHLORIDE 10 MG/1
10 TABLET ORAL
Status: DISCONTINUED | OUTPATIENT
Start: 2024-01-01 | End: 2024-01-01 | Stop reason: HOSPADM

## 2024-01-01 RX ORDER — METOPROLOL TARTRATE 25 MG/1
25 TABLET, FILM COATED ORAL 2 TIMES DAILY
Status: DISCONTINUED | OUTPATIENT
Start: 2024-01-01 | End: 2024-01-01

## 2024-01-01 RX ORDER — GUAIFENESIN 600 MG/1
1200 TABLET, EXTENDED RELEASE ORAL 2 TIMES DAILY
Status: DISCONTINUED | OUTPATIENT
Start: 2024-01-01 | End: 2024-01-01

## 2024-01-01 RX ORDER — POSACONAZOLE 100 MG/1
300 TABLET, DELAYED RELEASE ORAL EVERY MORNING
Status: DISCONTINUED | OUTPATIENT
Start: 2024-01-01 | End: 2024-01-01 | Stop reason: HOSPADM

## 2024-01-01 RX ORDER — HYDROMORPHONE HCL IN WATER/PF 6 MG/30 ML
0.2 PATIENT CONTROLLED ANALGESIA SYRINGE INTRAVENOUS EVERY 5 MIN PRN
Status: DISCONTINUED | OUTPATIENT
Start: 2024-01-01 | End: 2024-01-01 | Stop reason: HOSPADM

## 2024-01-01 RX ORDER — CARVEDILOL 6.25 MG/1
6.25 TABLET ORAL 2 TIMES DAILY
Status: DISCONTINUED | OUTPATIENT
Start: 2024-01-01 | End: 2024-01-01

## 2024-01-01 RX ORDER — FENTANYL CITRATE 50 UG/ML
INJECTION, SOLUTION INTRAMUSCULAR; INTRAVENOUS
Status: COMPLETED
Start: 2024-01-01 | End: 2024-01-01

## 2024-01-01 RX ORDER — LIDOCAINE HYDROCHLORIDE 10 MG/ML
5 INJECTION, SOLUTION EPIDURAL; INFILTRATION; INTRACAUDAL; PERINEURAL
Status: DISCONTINUED | OUTPATIENT
Start: 2024-01-01 | End: 2024-01-01

## 2024-01-01 RX ORDER — DEXAMETHASONE SODIUM PHOSPHATE 4 MG/ML
INJECTION, SOLUTION INTRA-ARTICULAR; INTRALESIONAL; INTRAMUSCULAR; INTRAVENOUS; SOFT TISSUE PRN
Status: DISCONTINUED | OUTPATIENT
Start: 2024-01-01 | End: 2024-01-01

## 2024-01-01 RX ORDER — HYDROXYZINE HYDROCHLORIDE 25 MG/1
25 TABLET, FILM COATED ORAL EVERY 6 HOURS PRN
Status: DISCONTINUED | OUTPATIENT
Start: 2024-01-01 | End: 2024-01-01

## 2024-01-01 RX ORDER — CEFTAZIDIME 2 G/1
2 INJECTION, POWDER, FOR SOLUTION INTRAVENOUS EVERY 8 HOURS
Status: COMPLETED | OUTPATIENT
Start: 2024-01-01 | End: 2024-01-01

## 2024-01-01 RX ORDER — VANCOMYCIN HYDROCHLORIDE 1 G/200ML
1000 INJECTION, SOLUTION INTRAVENOUS ONCE
Status: COMPLETED | OUTPATIENT
Start: 2024-01-01 | End: 2024-01-01

## 2024-01-01 RX ORDER — HYDROXYZINE HYDROCHLORIDE 50 MG/ML
50 INJECTION, SOLUTION INTRAMUSCULAR ONCE
Status: DISCONTINUED | OUTPATIENT
Start: 2024-01-01 | End: 2024-01-01

## 2024-01-01 RX ORDER — AZITHROMYCIN 200 MG/5ML
125 POWDER, FOR SUSPENSION ORAL DAILY
Status: DISCONTINUED | OUTPATIENT
Start: 2024-01-01 | End: 2024-01-01 | Stop reason: HOSPADM

## 2024-01-01 RX ORDER — B COMPLEX C NO.10/FOLIC ACID 900MCG/5ML
5 LIQUID (ML) ORAL DAILY
Status: DISCONTINUED | OUTPATIENT
Start: 2024-01-01 | End: 2024-01-01

## 2024-01-01 RX ORDER — OXYCODONE HCL 20 MG/ML
5 CONCENTRATE, ORAL ORAL
Status: DISCONTINUED | OUTPATIENT
Start: 2024-01-01 | End: 2024-01-01 | Stop reason: HOSPADM

## 2024-01-01 RX ORDER — MAGNESIUM SULFATE 1 G/100ML
1 INJECTION INTRAVENOUS ONCE
Status: COMPLETED | OUTPATIENT
Start: 2024-01-01 | End: 2024-01-01

## 2024-01-01 RX ORDER — QUETIAPINE FUMARATE 25 MG/1
25 TABLET, FILM COATED ORAL EVERY 8 HOURS
Status: DISCONTINUED | OUTPATIENT
Start: 2024-01-01 | End: 2024-01-01

## 2024-01-01 RX ORDER — MAGNESIUM SULFATE HEPTAHYDRATE 40 MG/ML
2 INJECTION, SOLUTION INTRAVENOUS ONCE
Status: COMPLETED | OUTPATIENT
Start: 2024-01-01 | End: 2024-01-01

## 2024-01-01 RX ORDER — SULFAMETHOXAZOLE AND TRIMETHOPRIM 200; 40 MG/5ML; MG/5ML
10 SUSPENSION ORAL
Status: DISCONTINUED | OUTPATIENT
Start: 2024-01-01 | End: 2024-01-01

## 2024-01-01 RX ORDER — VALGANCICLOVIR HYDROCHLORIDE 50 MG/ML
450 POWDER, FOR SOLUTION ORAL DAILY
Status: DISCONTINUED | OUTPATIENT
Start: 2024-01-01 | End: 2024-01-01

## 2024-01-01 RX ORDER — ACETYLCYSTEINE 100 MG/ML
4 SOLUTION ORAL; RESPIRATORY (INHALATION) 2 TIMES DAILY
Status: DISCONTINUED | OUTPATIENT
Start: 2024-01-01 | End: 2024-01-01

## 2024-01-01 RX ORDER — GUAR GUM
1 PACKET (EA) ORAL EVERY 8 HOURS
Status: DISCONTINUED | OUTPATIENT
Start: 2024-01-01 | End: 2024-01-01

## 2024-01-01 RX ORDER — FLUTICASONE PROPIONATE AND SALMETEROL 250; 50 UG/1; UG/1
1 POWDER RESPIRATORY (INHALATION) EVERY 12 HOURS
Status: DISCONTINUED | OUTPATIENT
Start: 2024-01-01 | End: 2024-01-01

## 2024-01-01 RX ORDER — LORAZEPAM 2 MG/ML
0.5 INJECTION INTRAMUSCULAR EVERY 6 HOURS PRN
Status: DISCONTINUED | OUTPATIENT
Start: 2024-01-01 | End: 2024-01-01 | Stop reason: HOSPADM

## 2024-01-01 RX ORDER — HYDRALAZINE HYDROCHLORIDE 20 MG/ML
10 INJECTION INTRAMUSCULAR; INTRAVENOUS EVERY 6 HOURS PRN
Status: DISCONTINUED | OUTPATIENT
Start: 2024-01-01 | End: 2024-01-01

## 2024-01-01 RX ORDER — QUETIAPINE FUMARATE 25 MG/1
25 TABLET, FILM COATED ORAL 3 TIMES DAILY
Status: DISCONTINUED | OUTPATIENT
Start: 2024-01-01 | End: 2024-01-01

## 2024-01-01 RX ORDER — HYDROMORPHONE HYDROCHLORIDE 1 MG/ML
0.5 INJECTION, SOLUTION INTRAMUSCULAR; INTRAVENOUS; SUBCUTANEOUS
Status: COMPLETED | OUTPATIENT
Start: 2024-01-01 | End: 2024-01-01

## 2024-01-01 RX ORDER — MIDODRINE HYDROCHLORIDE 5 MG/1
5 TABLET ORAL 2 TIMES DAILY PRN
Status: DISCONTINUED | OUTPATIENT
Start: 2024-01-01 | End: 2024-01-01

## 2024-01-01 RX ORDER — NOREPINEPHRINE BITARTRATE 0.06 MG/ML
INJECTION, SOLUTION INTRAVENOUS
Status: COMPLETED
Start: 2024-01-01 | End: 2024-01-01

## 2024-01-01 RX ORDER — PIPERACILLIN SODIUM, TAZOBACTAM SODIUM 2; .25 G/10ML; G/10ML
2.25 INJECTION, POWDER, LYOPHILIZED, FOR SOLUTION INTRAVENOUS EVERY 8 HOURS
Status: DISCONTINUED | OUTPATIENT
Start: 2024-01-01 | End: 2024-01-01

## 2024-01-01 RX ORDER — AMOXICILLIN 250 MG
1 CAPSULE ORAL 2 TIMES DAILY PRN
Status: CANCELLED | OUTPATIENT
Start: 2024-01-01

## 2024-01-01 RX ORDER — HYDRALAZINE HYDROCHLORIDE 20 MG/ML
10 INJECTION INTRAMUSCULAR; INTRAVENOUS EVERY 6 HOURS PRN
Status: DISCONTINUED | OUTPATIENT
Start: 2024-01-01 | End: 2024-01-01 | Stop reason: HOSPADM

## 2024-01-01 RX ORDER — PROCHLORPERAZINE MALEATE 5 MG
10 TABLET ORAL EVERY 6 HOURS PRN
Status: DISCONTINUED | OUTPATIENT
Start: 2024-01-01 | End: 2024-01-01

## 2024-01-01 RX ORDER — LORAZEPAM 2 MG/ML
1 CONCENTRATE ORAL
Status: DISCONTINUED | OUTPATIENT
Start: 2024-01-01 | End: 2024-01-01 | Stop reason: HOSPADM

## 2024-01-01 RX ORDER — ALBUMIN (HUMAN) 12.5 G/50ML
50 SOLUTION INTRAVENOUS
Status: DISCONTINUED | OUTPATIENT
Start: 2024-01-01 | End: 2024-01-01

## 2024-01-01 RX ADMIN — LEVALBUTEROL HYDROCHLORIDE 0.63 MG: 0.63 SOLUTION RESPIRATORY (INHALATION) at 21:00

## 2024-01-01 RX ADMIN — ACETYLCYSTEINE 2 ML: 200 SOLUTION ORAL; RESPIRATORY (INHALATION) at 07:24

## 2024-01-01 RX ADMIN — PANCRELIPASE 1 CAPSULE: 60000; 12000; 38000 CAPSULE, DELAYED RELEASE PELLETS ORAL at 13:26

## 2024-01-01 RX ADMIN — FENTANYL CITRATE 25 MCG: 50 INJECTION INTRAMUSCULAR; INTRAVENOUS at 02:00

## 2024-01-01 RX ADMIN — QUETIAPINE FUMARATE 25 MG: 25 TABLET ORAL at 12:46

## 2024-01-01 RX ADMIN — ACETYLCYSTEINE 4 ML: 100 SOLUTION ORAL; RESPIRATORY (INHALATION) at 08:50

## 2024-01-01 RX ADMIN — Medication 1 TABLET: at 09:17

## 2024-01-01 RX ADMIN — CHLORHEXIDINE GLUCONATE 0.12% ORAL RINSE 15 ML: 1.2 LIQUID ORAL at 07:40

## 2024-01-01 RX ADMIN — TACROLIMUS 1 MG: 5 CAPSULE ORAL at 08:05

## 2024-01-01 RX ADMIN — LEVALBUTEROL HYDROCHLORIDE 0.63 MG: 0.63 SOLUTION RESPIRATORY (INHALATION) at 09:54

## 2024-01-01 RX ADMIN — MONTELUKAST 10 MG: 10 TABLET, FILM COATED ORAL at 22:09

## 2024-01-01 RX ADMIN — SODIUM CHLORIDE SOLN NEBU 3% 3 ML: 3 NEBU SOLN at 21:59

## 2024-01-01 RX ADMIN — LINEZOLID 600 MG: 600 TABLET, FILM COATED ORAL at 00:26

## 2024-01-01 RX ADMIN — MIDODRINE HYDROCHLORIDE 15 MG: 5 TABLET ORAL at 21:49

## 2024-01-01 RX ADMIN — CALCIUM CHLORIDE, MAGNESIUM CHLORIDE, SODIUM CHLORIDE, SODIUM BICARBONATE, POTASSIUM CHLORIDE AND SODIUM PHOSPHATE DIBASIC DIHYDRATE: 3.68; 3.05; 6.34; 3.09; .314; .187 INJECTION INTRAVENOUS at 13:14

## 2024-01-01 RX ADMIN — QUETIAPINE FUMARATE 50 MG: 50 TABLET ORAL at 08:00

## 2024-01-01 RX ADMIN — FENTANYL CITRATE 100 MCG: 50 INJECTION INTRAMUSCULAR; INTRAVENOUS at 11:17

## 2024-01-01 RX ADMIN — INSULIN HUMAN 8 UNITS: 100 INJECTION, SUSPENSION SUBCUTANEOUS at 08:47

## 2024-01-01 RX ADMIN — Medication 1 PACKET: at 20:06

## 2024-01-01 RX ADMIN — CHLORHEXIDINE GLUCONATE 0.12% ORAL RINSE 15 ML: 1.2 LIQUID ORAL at 08:16

## 2024-01-01 RX ADMIN — ACETAMINOPHEN 325 MG: 325 TABLET, FILM COATED ORAL at 10:26

## 2024-01-01 RX ADMIN — LEVALBUTEROL HYDROCHLORIDE 0.63 MG: 0.63 SOLUTION RESPIRATORY (INHALATION) at 16:07

## 2024-01-01 RX ADMIN — SODIUM CHLORIDE SOLN NEBU 3% 3 ML: 3 NEBU SOLN at 11:46

## 2024-01-01 RX ADMIN — PROPOFOL 10 MCG/KG/MIN: 10 INJECTION, EMULSION INTRAVENOUS at 22:35

## 2024-01-01 RX ADMIN — CALCIUM CHLORIDE, MAGNESIUM CHLORIDE, SODIUM CHLORIDE, SODIUM BICARBONATE, POTASSIUM CHLORIDE AND SODIUM PHOSPHATE DIBASIC DIHYDRATE 12.5 ML/KG/HR: 3.68; 3.05; 6.34; 3.09; .314; .187 INJECTION INTRAVENOUS at 04:21

## 2024-01-01 RX ADMIN — Medication 50 MG: at 22:28

## 2024-01-01 RX ADMIN — METOPROLOL TARTRATE 25 MG: 25 TABLET, FILM COATED ORAL at 18:10

## 2024-01-01 RX ADMIN — CALCIUM CHLORIDE, MAGNESIUM CHLORIDE, SODIUM CHLORIDE, SODIUM BICARBONATE, POTASSIUM CHLORIDE AND SODIUM PHOSPHATE DIBASIC DIHYDRATE 12.5 ML/KG/HR: 3.68; 3.05; 6.34; 3.09; .314; .187 INJECTION INTRAVENOUS at 19:52

## 2024-01-01 RX ADMIN — CARVEDILOL 6.25 MG: 6.25 TABLET, FILM COATED ORAL at 21:42

## 2024-01-01 RX ADMIN — Medication 100 MCG/HR: at 05:52

## 2024-01-01 RX ADMIN — AZITHROMYCIN 125 MG: 200 POWDER, FOR SUSPENSION PARENTERAL at 09:09

## 2024-01-01 RX ADMIN — HYDROCORTISONE SODIUM SUCCINATE 50 MG: 100 INJECTION, POWDER, FOR SOLUTION INTRAMUSCULAR; INTRAVENOUS at 20:18

## 2024-01-01 RX ADMIN — Medication 1 MG: at 08:20

## 2024-01-01 RX ADMIN — DEXMEDETOMIDINE HYDROCHLORIDE 1 MCG/KG/HR: 4 INJECTION, SOLUTION INTRAVENOUS at 04:26

## 2024-01-01 RX ADMIN — Medication 1 PACKET: at 08:02

## 2024-01-01 RX ADMIN — ACETYLCYSTEINE 2 ML: 200 SOLUTION ORAL; RESPIRATORY (INHALATION) at 08:12

## 2024-01-01 RX ADMIN — OXYCODONE HYDROCHLORIDE 5 MG: 100 SOLUTION ORAL at 10:11

## 2024-01-01 RX ADMIN — TACROLIMUS 0.8 MG: 5 CAPSULE ORAL at 08:51

## 2024-01-01 RX ADMIN — CHLORHEXIDINE GLUCONATE 0.12% ORAL RINSE 15 ML: 1.2 LIQUID ORAL at 08:09

## 2024-01-01 RX ADMIN — LORAZEPAM 1 MG: 2 INJECTION INTRAMUSCULAR; INTRAVENOUS at 21:53

## 2024-01-01 RX ADMIN — QUETIAPINE FUMARATE 50 MG: 50 TABLET ORAL at 22:10

## 2024-01-01 RX ADMIN — HYDROCORTISONE SODIUM SUCCINATE 50 MG: 100 INJECTION, POWDER, FOR SOLUTION INTRAMUSCULAR; INTRAVENOUS at 08:10

## 2024-01-01 RX ADMIN — AZITHROMYCIN 125 MG: 200 POWDER, FOR SUSPENSION PARENTERAL at 08:29

## 2024-01-01 RX ADMIN — POSACONAZOLE 300 MG: 100 TABLET, DELAYED RELEASE ORAL at 14:04

## 2024-01-01 RX ADMIN — Medication 5 ML: at 08:06

## 2024-01-01 RX ADMIN — QUETIAPINE FUMARATE 50 MG: 50 TABLET ORAL at 22:01

## 2024-01-01 RX ADMIN — CHLORHEXIDINE GLUCONATE 0.12% ORAL RINSE 15 ML: 1.2 LIQUID ORAL at 10:17

## 2024-01-01 RX ADMIN — MONTELUKAST 10 MG: 10 TABLET, FILM COATED ORAL at 21:16

## 2024-01-01 RX ADMIN — LEVALBUTEROL HYDROCHLORIDE 0.63 MG: 0.63 SOLUTION RESPIRATORY (INHALATION) at 15:20

## 2024-01-01 RX ADMIN — LEVALBUTEROL HYDROCHLORIDE 0.63 MG: 0.63 SOLUTION RESPIRATORY (INHALATION) at 16:09

## 2024-01-01 RX ADMIN — METOPROLOL TARTRATE 25 MG: 25 TABLET, FILM COATED ORAL at 08:06

## 2024-01-01 RX ADMIN — Medication 40 MG: at 21:27

## 2024-01-01 RX ADMIN — Medication 1 PACKET: at 08:47

## 2024-01-01 RX ADMIN — METOPROLOL TARTRATE 25 MG: 25 TABLET, FILM COATED ORAL at 07:30

## 2024-01-01 RX ADMIN — EPOETIN ALFA-EPBX 4000 UNITS: 10000 INJECTION, SOLUTION INTRAVENOUS; SUBCUTANEOUS at 11:50

## 2024-01-01 RX ADMIN — MIDODRINE HYDROCHLORIDE 15 MG: 5 TABLET ORAL at 18:11

## 2024-01-01 RX ADMIN — Medication 5 ML: at 07:54

## 2024-01-01 RX ADMIN — LEVALBUTEROL HYDROCHLORIDE 0.63 MG: 0.63 SOLUTION RESPIRATORY (INHALATION) at 11:28

## 2024-01-01 RX ADMIN — CHLORHEXIDINE GLUCONATE 0.12% ORAL RINSE 15 ML: 1.2 LIQUID ORAL at 07:38

## 2024-01-01 RX ADMIN — CEFTAZIDIME 2 G: 2 INJECTION, POWDER, FOR SOLUTION INTRAVENOUS at 14:04

## 2024-01-01 RX ADMIN — INSULIN HUMAN 8 UNITS: 100 INJECTION, SUSPENSION SUBCUTANEOUS at 23:11

## 2024-01-01 RX ADMIN — HEPARIN SODIUM 5000 UNITS: 5000 INJECTION, SOLUTION INTRAVENOUS; SUBCUTANEOUS at 06:10

## 2024-01-01 RX ADMIN — Medication 1 MG: at 08:49

## 2024-01-01 RX ADMIN — Medication 1 MG: at 17:22

## 2024-01-01 RX ADMIN — CHLORHEXIDINE GLUCONATE 0.12% ORAL RINSE 15 ML: 1.2 LIQUID ORAL at 08:35

## 2024-01-01 RX ADMIN — INSULIN ASPART 3 UNITS: 100 INJECTION, SOLUTION INTRAVENOUS; SUBCUTANEOUS at 17:43

## 2024-01-01 RX ADMIN — Medication 1.8 UNITS/HR: at 06:05

## 2024-01-01 RX ADMIN — LORAZEPAM 1 MG: 2 INJECTION INTRAMUSCULAR; INTRAVENOUS at 09:30

## 2024-01-01 RX ADMIN — DEXMEDETOMIDINE HYDROCHLORIDE 1.2 MCG/KG/HR: 4 INJECTION, SOLUTION INTRAVENOUS at 15:29

## 2024-01-01 RX ADMIN — FENTANYL CITRATE 50 MCG: 50 INJECTION INTRAMUSCULAR; INTRAVENOUS at 11:17

## 2024-01-01 RX ADMIN — FENTANYL CITRATE 50 MCG: 50 INJECTION INTRAMUSCULAR; INTRAVENOUS at 07:38

## 2024-01-01 RX ADMIN — LEVALBUTEROL HYDROCHLORIDE 0.63 MG: 0.63 SOLUTION RESPIRATORY (INHALATION) at 08:43

## 2024-01-01 RX ADMIN — CHLORHEXIDINE GLUCONATE 0.12% ORAL RINSE 15 ML: 1.2 LIQUID ORAL at 08:57

## 2024-01-01 RX ADMIN — LINEZOLID 600 MG: 600 TABLET, FILM COATED ORAL at 14:03

## 2024-01-01 RX ADMIN — MIDODRINE HYDROCHLORIDE 15 MG: 5 TABLET ORAL at 12:37

## 2024-01-01 RX ADMIN — ACETYLCYSTEINE 4 ML: 100 SOLUTION ORAL; RESPIRATORY (INHALATION) at 07:20

## 2024-01-01 RX ADMIN — Medication 1 MG: at 08:44

## 2024-01-01 RX ADMIN — METOPROLOL TARTRATE 25 MG: 25 TABLET, FILM COATED ORAL at 08:47

## 2024-01-01 RX ADMIN — PREDNISONE 40 MG: 20 TABLET ORAL at 08:49

## 2024-01-01 RX ADMIN — MINOCYCLINE HYDROCHLORIDE 100 MG: 100 INJECTION INTRAVENOUS at 23:03

## 2024-01-01 RX ADMIN — INSULIN HUMAN 8 UNITS: 100 INJECTION, SUSPENSION SUBCUTANEOUS at 20:43

## 2024-01-01 RX ADMIN — ACETAMINOPHEN 650 MG: 325 TABLET, FILM COATED ORAL at 04:46

## 2024-01-01 RX ADMIN — SULFAMETHOXAZOLE AND TRIMETHOPRIM 1 TABLET: 400; 80 TABLET ORAL at 21:26

## 2024-01-01 RX ADMIN — ACETAMINOPHEN 975 MG: 325 TABLET, FILM COATED ORAL at 13:58

## 2024-01-01 RX ADMIN — PROCHLORPERAZINE EDISYLATE 10 MG: 5 INJECTION INTRAMUSCULAR; INTRAVENOUS at 11:43

## 2024-01-01 RX ADMIN — ACETAMINOPHEN 325 MG: 325 TABLET, FILM COATED ORAL at 08:14

## 2024-01-01 RX ADMIN — MICAFUNGIN SODIUM 150 MG: 50 INJECTION, POWDER, LYOPHILIZED, FOR SOLUTION INTRAVENOUS at 22:07

## 2024-01-01 RX ADMIN — Medication 1 PACKET: at 08:29

## 2024-01-01 RX ADMIN — QUETIAPINE FUMARATE 25 MG: 25 TABLET ORAL at 22:28

## 2024-01-01 RX ADMIN — Medication 60 ML: at 11:50

## 2024-01-01 RX ADMIN — HYDROCORTISONE SODIUM SUCCINATE 50 MG: 100 INJECTION, POWDER, FOR SOLUTION INTRAMUSCULAR; INTRAVENOUS at 20:01

## 2024-01-01 RX ADMIN — METOPROLOL TARTRATE 25 MG: 25 TABLET, FILM COATED ORAL at 08:20

## 2024-01-01 RX ADMIN — MONTELUKAST 10 MG: 10 TABLET, FILM COATED ORAL at 21:43

## 2024-01-01 RX ADMIN — MIDODRINE HYDROCHLORIDE 20 MG: 5 TABLET ORAL at 21:00

## 2024-01-01 RX ADMIN — MINOCYCLINE HYDROCHLORIDE 100 MG: 100 INJECTION INTRAVENOUS at 00:23

## 2024-01-01 RX ADMIN — Medication 40 MG: at 20:54

## 2024-01-01 RX ADMIN — INSULIN ASPART 1 UNITS: 100 INJECTION, SOLUTION INTRAVENOUS; SUBCUTANEOUS at 16:11

## 2024-01-01 RX ADMIN — Medication 25 MG: at 21:00

## 2024-01-01 RX ADMIN — ACETYLCYSTEINE 4 ML: 100 SOLUTION ORAL; RESPIRATORY (INHALATION) at 17:02

## 2024-01-01 RX ADMIN — DEXTROSE MONOHYDRATE 1000 ML: 100 INJECTION, SOLUTION INTRAVENOUS at 21:25

## 2024-01-01 RX ADMIN — CHLORHEXIDINE GLUCONATE 0.12% ORAL RINSE 15 ML: 1.2 LIQUID ORAL at 20:25

## 2024-01-01 RX ADMIN — POSACONAZOLE 300 MG: 100 TABLET, DELAYED RELEASE ORAL at 12:24

## 2024-01-01 RX ADMIN — SODIUM CHLORIDE 300 ML: 9 INJECTION, SOLUTION INTRAVENOUS at 11:31

## 2024-01-01 RX ADMIN — Medication 50 MCG: at 10:09

## 2024-01-01 RX ADMIN — LEVALBUTEROL HYDROCHLORIDE 0.63 MG: 0.63 SOLUTION RESPIRATORY (INHALATION) at 21:04

## 2024-01-01 RX ADMIN — ACETAMINOPHEN 975 MG: 325 TABLET, FILM COATED ORAL at 15:00

## 2024-01-01 RX ADMIN — METOPROLOL TARTRATE 25 MG: 25 TABLET, FILM COATED ORAL at 06:22

## 2024-01-01 RX ADMIN — INSULIN GLARGINE 5 UNITS: 100 INJECTION, SOLUTION SUBCUTANEOUS at 08:32

## 2024-01-01 RX ADMIN — TRAZODONE HYDROCHLORIDE 50 MG: 50 TABLET ORAL at 21:15

## 2024-01-01 RX ADMIN — AZITHROMYCIN 125 MG: 200 POWDER, FOR SUSPENSION PARENTERAL at 09:05

## 2024-01-01 RX ADMIN — LINEZOLID 600 MG: 600 TABLET, FILM COATED ORAL at 14:04

## 2024-01-01 RX ADMIN — VALGANCICLOVIR HYDROCHLORIDE 450 MG: 50 POWDER, FOR SOLUTION ORAL at 21:19

## 2024-01-01 RX ADMIN — MINOCYCLINE HYDROCHLORIDE 100 MG: 100 INJECTION INTRAVENOUS at 23:49

## 2024-01-01 RX ADMIN — METOPROLOL TARTRATE 25 MG: 25 TABLET, FILM COATED ORAL at 23:00

## 2024-01-01 RX ADMIN — HEPARIN SODIUM 5000 UNITS: 5000 INJECTION, SOLUTION INTRAVENOUS; SUBCUTANEOUS at 14:11

## 2024-01-01 RX ADMIN — LORAZEPAM 0.5 MG: 2 LIQUID ORAL at 23:49

## 2024-01-01 RX ADMIN — INSULIN HUMAN 8 UNITS: 100 INJECTION, SUSPENSION SUBCUTANEOUS at 19:52

## 2024-01-01 RX ADMIN — Medication 40 MG: at 11:59

## 2024-01-01 RX ADMIN — HYDROCORTISONE SODIUM SUCCINATE 50 MG: 100 INJECTION, POWDER, FOR SOLUTION INTRAMUSCULAR; INTRAVENOUS at 10:34

## 2024-01-01 RX ADMIN — Medication 60 ML: at 11:09

## 2024-01-01 RX ADMIN — INSULIN ASPART 3 UNITS: 100 INJECTION, SOLUTION INTRAVENOUS; SUBCUTANEOUS at 16:14

## 2024-01-01 RX ADMIN — LEVALBUTEROL HYDROCHLORIDE 0.63 MG: 0.63 SOLUTION RESPIRATORY (INHALATION) at 21:20

## 2024-01-01 RX ADMIN — HEPARIN SODIUM 5000 UNITS: 5000 INJECTION, SOLUTION INTRAVENOUS; SUBCUTANEOUS at 21:13

## 2024-01-01 RX ADMIN — MONTELUKAST 10 MG: 10 TABLET, FILM COATED ORAL at 20:57

## 2024-01-01 RX ADMIN — MEROPENEM 1 G: 1 INJECTION, POWDER, FOR SOLUTION INTRAVENOUS at 05:04

## 2024-01-01 RX ADMIN — LIDOCAINE HYDROCHLORIDE 0.5 ML: 10 INJECTION, SOLUTION EPIDURAL; INFILTRATION; INTRACAUDAL; PERINEURAL at 08:20

## 2024-01-01 RX ADMIN — QUETIAPINE FUMARATE 25 MG: 25 TABLET ORAL at 20:59

## 2024-01-01 RX ADMIN — METOPROLOL TARTRATE 25 MG: 25 TABLET, FILM COATED ORAL at 06:50

## 2024-01-01 RX ADMIN — QUETIAPINE FUMARATE 25 MG: 25 TABLET ORAL at 06:00

## 2024-01-01 RX ADMIN — AZITHROMYCIN MONOHYDRATE 500 MG: 500 INJECTION, POWDER, LYOPHILIZED, FOR SOLUTION INTRAVENOUS at 20:56

## 2024-01-01 RX ADMIN — SULFAMETHOXAZOLE AND TRIMETHOPRIM 1 TABLET: 400; 80 TABLET ORAL at 23:42

## 2024-01-01 RX ADMIN — RAMELTEON 8 MG: 8 TABLET ORAL at 20:40

## 2024-01-01 RX ADMIN — ACETYLCYSTEINE 4 ML: 100 SOLUTION ORAL; RESPIRATORY (INHALATION) at 09:02

## 2024-01-01 RX ADMIN — TACROLIMUS 0.8 MG: 5 CAPSULE ORAL at 08:56

## 2024-01-01 RX ADMIN — HEPARIN SODIUM 5000 UNITS: 5000 INJECTION, SOLUTION INTRAVENOUS; SUBCUTANEOUS at 06:55

## 2024-01-01 RX ADMIN — HEPARIN SODIUM 500 UNITS: 1000 INJECTION INTRAVENOUS; SUBCUTANEOUS at 14:19

## 2024-01-01 RX ADMIN — ACETYLCYSTEINE 4 ML: 100 SOLUTION ORAL; RESPIRATORY (INHALATION) at 08:53

## 2024-01-01 RX ADMIN — LEVALBUTEROL HYDROCHLORIDE 0.63 MG: 0.63 SOLUTION RESPIRATORY (INHALATION) at 16:10

## 2024-01-01 RX ADMIN — Medication 0.5 MG: at 09:13

## 2024-01-01 RX ADMIN — INSULIN ASPART 2 UNITS: 100 INJECTION, SOLUTION INTRAVENOUS; SUBCUTANEOUS at 13:55

## 2024-01-01 RX ADMIN — Medication 1 PACKET: at 08:18

## 2024-01-01 RX ADMIN — HYDROMORPHONE HYDROCHLORIDE 0.5 MG: 1 INJECTION, SOLUTION INTRAMUSCULAR; INTRAVENOUS; SUBCUTANEOUS at 21:42

## 2024-01-01 RX ADMIN — HEPARIN SODIUM 5000 UNITS: 5000 INJECTION, SOLUTION INTRAVENOUS; SUBCUTANEOUS at 11:49

## 2024-01-01 RX ADMIN — ACETAMINOPHEN 650 MG: 325 TABLET, FILM COATED ORAL at 20:55

## 2024-01-01 RX ADMIN — DAPTOMYCIN 400 MG: 500 INJECTION, POWDER, LYOPHILIZED, FOR SOLUTION INTRAVENOUS at 13:47

## 2024-01-01 RX ADMIN — ACETAMINOPHEN 325 MG: 325 TABLET, FILM COATED ORAL at 15:43

## 2024-01-01 RX ADMIN — QUETIAPINE FUMARATE 25 MG: 25 TABLET ORAL at 20:51

## 2024-01-01 RX ADMIN — METOPROLOL TARTRATE 25 MG: 25 TABLET, FILM COATED ORAL at 08:28

## 2024-01-01 RX ADMIN — CALCIUM CHLORIDE, MAGNESIUM CHLORIDE, SODIUM CHLORIDE, SODIUM BICARBONATE, POTASSIUM CHLORIDE AND SODIUM PHOSPHATE DIBASIC DIHYDRATE 12.5 ML/KG/HR: 3.68; 3.05; 6.34; 3.09; .314; .187 INJECTION INTRAVENOUS at 10:25

## 2024-01-01 RX ADMIN — INSULIN ASPART 2 UNITS: 100 INJECTION, SOLUTION INTRAVENOUS; SUBCUTANEOUS at 15:56

## 2024-01-01 RX ADMIN — TACROLIMUS 0.8 MG: 5 CAPSULE ORAL at 08:25

## 2024-01-01 RX ADMIN — ACETAMINOPHEN 325 MG: 325 TABLET, FILM COATED ORAL at 01:03

## 2024-01-01 RX ADMIN — Medication 1 TABLET: at 08:46

## 2024-01-01 RX ADMIN — INSULIN ASPART 1 UNITS: 100 INJECTION, SOLUTION INTRAVENOUS; SUBCUTANEOUS at 20:20

## 2024-01-01 RX ADMIN — HYDROCORTISONE SODIUM SUCCINATE 50 MG: 100 INJECTION, POWDER, FOR SOLUTION INTRAMUSCULAR; INTRAVENOUS at 16:09

## 2024-01-01 RX ADMIN — CEFTAZIDIME 2 G: 2 INJECTION, POWDER, FOR SOLUTION INTRAVENOUS at 20:18

## 2024-01-01 RX ADMIN — ISODIUM CHLORIDE 3 ML: 0.03 SOLUTION RESPIRATORY (INHALATION) at 20:31

## 2024-01-01 RX ADMIN — INSULIN ASPART 1 UNITS: 100 INJECTION, SOLUTION INTRAVENOUS; SUBCUTANEOUS at 20:45

## 2024-01-01 RX ADMIN — ACETYLCYSTEINE 2 ML: 200 SOLUTION ORAL; RESPIRATORY (INHALATION) at 07:31

## 2024-01-01 RX ADMIN — POTASSIUM CHLORIDE 60 MEQ: 40 SOLUTION ORAL at 03:01

## 2024-01-01 RX ADMIN — Medication 1 MG: at 11:09

## 2024-01-01 RX ADMIN — LEVALBUTEROL HYDROCHLORIDE 0.63 MG: 0.63 SOLUTION RESPIRATORY (INHALATION) at 19:17

## 2024-01-01 RX ADMIN — LORAZEPAM 0.5 MG: 2 LIQUID ORAL at 06:14

## 2024-01-01 RX ADMIN — ONDANSETRON 4 MG: 2 INJECTION INTRAMUSCULAR; INTRAVENOUS at 14:29

## 2024-01-01 RX ADMIN — Medication 50 MCG: at 08:44

## 2024-01-01 RX ADMIN — CHLORHEXIDINE GLUCONATE 0.12% ORAL RINSE 15 ML: 1.2 LIQUID ORAL at 19:38

## 2024-01-01 RX ADMIN — PREDNISONE 50 MG: 50 TABLET ORAL at 08:34

## 2024-01-01 RX ADMIN — LEVALBUTEROL HYDROCHLORIDE 0.63 MG: 0.63 SOLUTION RESPIRATORY (INHALATION) at 20:49

## 2024-01-01 RX ADMIN — Medication 1 PACKET: at 20:01

## 2024-01-01 RX ADMIN — ACETYLCYSTEINE 4 ML: 100 SOLUTION ORAL; RESPIRATORY (INHALATION) at 21:01

## 2024-01-01 RX ADMIN — LEVALBUTEROL HYDROCHLORIDE 0.63 MG: 0.63 SOLUTION RESPIRATORY (INHALATION) at 13:04

## 2024-01-01 RX ADMIN — AZITHROMYCIN 125 MG: 200 POWDER, FOR SUSPENSION PARENTERAL at 09:13

## 2024-01-01 RX ADMIN — Medication 1 PACKET: at 00:12

## 2024-01-01 RX ADMIN — VALGANCICLOVIR HYDROCHLORIDE 450 MG: 50 POWDER, FOR SOLUTION ORAL at 20:40

## 2024-01-01 RX ADMIN — INSULIN ASPART 4 UNITS: 100 INJECTION, SOLUTION INTRAVENOUS; SUBCUTANEOUS at 20:17

## 2024-01-01 RX ADMIN — PANTOPRAZOLE SODIUM 40 MG: 40 INJECTION, POWDER, FOR SOLUTION INTRAVENOUS at 07:53

## 2024-01-01 RX ADMIN — POSACONAZOLE 300 MG: 100 TABLET, DELAYED RELEASE ORAL at 13:05

## 2024-01-01 RX ADMIN — POTASSIUM CHLORIDE 20 MEQ: 1.5 POWDER, FOR SOLUTION ORAL at 10:46

## 2024-01-01 RX ADMIN — CHLORHEXIDINE GLUCONATE 0.12% ORAL RINSE 15 ML: 1.2 LIQUID ORAL at 09:05

## 2024-01-01 RX ADMIN — Medication 40 MG: at 20:15

## 2024-01-01 RX ADMIN — AZITHROMYCIN DIHYDRATE 250 MG: 250 TABLET ORAL at 07:31

## 2024-01-01 RX ADMIN — POSACONAZOLE 300 MG: 100 TABLET, DELAYED RELEASE ORAL at 13:06

## 2024-01-01 RX ADMIN — QUETIAPINE FUMARATE 50 MG: 50 TABLET ORAL at 19:51

## 2024-01-01 RX ADMIN — ACETYLCYSTEINE 4 ML: 100 SOLUTION ORAL; RESPIRATORY (INHALATION) at 16:41

## 2024-01-01 RX ADMIN — METOPROLOL TARTRATE 25 MG: 25 TABLET, FILM COATED ORAL at 20:03

## 2024-01-01 RX ADMIN — INSULIN ASPART 1 UNITS: 100 INJECTION, SOLUTION INTRAVENOUS; SUBCUTANEOUS at 12:08

## 2024-01-01 RX ADMIN — PREDNISONE 50 MG: 50 TABLET ORAL at 09:05

## 2024-01-01 RX ADMIN — QUETIAPINE FUMARATE 25 MG: 25 TABLET ORAL at 05:33

## 2024-01-01 RX ADMIN — MICAFUNGIN SODIUM 150 MG: 50 INJECTION, POWDER, LYOPHILIZED, FOR SOLUTION INTRAVENOUS at 01:09

## 2024-01-01 RX ADMIN — CHLORHEXIDINE GLUCONATE 0.12% ORAL RINSE 15 ML: 1.2 LIQUID ORAL at 20:52

## 2024-01-01 RX ADMIN — MINOCYCLINE HYDROCHLORIDE 100 MG: 100 INJECTION INTRAVENOUS at 12:37

## 2024-01-01 RX ADMIN — DEXAMETHASONE SODIUM PHOSPHATE 10 MG: 4 INJECTION, SOLUTION INTRA-ARTICULAR; INTRALESIONAL; INTRAMUSCULAR; INTRAVENOUS; SOFT TISSUE at 08:04

## 2024-01-01 RX ADMIN — MIDAZOLAM 1 MG: 1 INJECTION INTRAMUSCULAR; INTRAVENOUS at 07:44

## 2024-01-01 RX ADMIN — Medication 30 MG: at 11:11

## 2024-01-01 RX ADMIN — RAMELTEON 8 MG: 8 TABLET, FILM COATED ORAL at 20:45

## 2024-01-01 RX ADMIN — PIPERACILLIN AND TAZOBACTAM 2.25 G: 2; .25 INJECTION, POWDER, FOR SOLUTION INTRAVENOUS at 05:09

## 2024-01-01 RX ADMIN — LEVALBUTEROL HYDROCHLORIDE 0.63 MG: 0.63 SOLUTION RESPIRATORY (INHALATION) at 19:45

## 2024-01-01 RX ADMIN — PIPERACILLIN AND TAZOBACTAM 2.25 G: 2; .25 INJECTION, POWDER, FOR SOLUTION INTRAVENOUS at 12:00

## 2024-01-01 RX ADMIN — INSULIN ASPART 1 UNITS: 100 INJECTION, SOLUTION INTRAVENOUS; SUBCUTANEOUS at 13:05

## 2024-01-01 RX ADMIN — TACROLIMUS 1 MG: 5 CAPSULE ORAL at 08:47

## 2024-01-01 RX ADMIN — QUETIAPINE FUMARATE 50 MG: 50 TABLET ORAL at 21:49

## 2024-01-01 RX ADMIN — CALCIUM CHLORIDE, MAGNESIUM CHLORIDE, SODIUM CHLORIDE, SODIUM BICARBONATE, POTASSIUM CHLORIDE AND SODIUM PHOSPHATE DIBASIC DIHYDRATE: 3.68; 3.05; 6.34; 3.09; .314; .187 INJECTION INTRAVENOUS at 23:21

## 2024-01-01 RX ADMIN — ESCITALOPRAM 10 MG: 5 SOLUTION ORAL at 08:34

## 2024-01-01 RX ADMIN — CHLORHEXIDINE GLUCONATE 0.12% ORAL RINSE 15 ML: 1.2 LIQUID ORAL at 11:41

## 2024-01-01 RX ADMIN — CALCIUM CHLORIDE, MAGNESIUM CHLORIDE, SODIUM CHLORIDE, SODIUM BICARBONATE, POTASSIUM CHLORIDE AND SODIUM PHOSPHATE DIBASIC DIHYDRATE 12.5 ML/KG/HR: 3.68; 3.05; 6.34; 3.09; .314; .187 INJECTION INTRAVENOUS at 23:41

## 2024-01-01 RX ADMIN — Medication 25 MG: at 22:21

## 2024-01-01 RX ADMIN — EPOETIN ALFA-EPBX 4000 UNITS: 10000 INJECTION, SOLUTION INTRAVENOUS; SUBCUTANEOUS at 11:00

## 2024-01-01 RX ADMIN — AZITHROMYCIN MONOHYDRATE 500 MG: 500 INJECTION, POWDER, LYOPHILIZED, FOR SOLUTION INTRAVENOUS at 18:26

## 2024-01-01 RX ADMIN — CHLORHEXIDINE GLUCONATE 0.12% ORAL RINSE 15 ML: 1.2 LIQUID ORAL at 21:09

## 2024-01-01 RX ADMIN — HEPARIN SODIUM 1 BAG: 200 INJECTION, SOLUTION INTRAVENOUS at 12:41

## 2024-01-01 RX ADMIN — HEPARIN SODIUM 5000 UNITS: 5000 INJECTION, SOLUTION INTRAVENOUS; SUBCUTANEOUS at 21:42

## 2024-01-01 RX ADMIN — MONTELUKAST 10 MG: 10 TABLET, FILM COATED ORAL at 21:00

## 2024-01-01 RX ADMIN — LORAZEPAM 1 MG: 2 LIQUID ORAL at 12:27

## 2024-01-01 RX ADMIN — HYDROCORTISONE SODIUM SUCCINATE 25 MG: 100 INJECTION, POWDER, FOR SOLUTION INTRAMUSCULAR; INTRAVENOUS at 20:05

## 2024-01-01 RX ADMIN — Medication 40 MG: at 19:38

## 2024-01-01 RX ADMIN — EPOETIN ALFA-EPBX 4000 UNITS: 10000 INJECTION, SOLUTION INTRAVENOUS; SUBCUTANEOUS at 08:33

## 2024-01-01 RX ADMIN — CHLORHEXIDINE GLUCONATE 0.12% ORAL RINSE 15 ML: 1.2 LIQUID ORAL at 08:22

## 2024-01-01 RX ADMIN — SULFAMETHOXAZOLE AND TRIMETHOPRIM 80 MG: 200; 40 SUSPENSION ORAL at 20:48

## 2024-01-01 RX ADMIN — MIDODRINE HYDROCHLORIDE 20 MG: 5 TABLET ORAL at 21:12

## 2024-01-01 RX ADMIN — LEVALBUTEROL HYDROCHLORIDE 0.63 MG: 0.63 SOLUTION RESPIRATORY (INHALATION) at 16:35

## 2024-01-01 RX ADMIN — LEVALBUTEROL HYDROCHLORIDE 0.63 MG: 0.63 SOLUTION RESPIRATORY (INHALATION) at 20:09

## 2024-01-01 RX ADMIN — VALGANCICLOVIR HYDROCHLORIDE 450 MG: 50 POWDER, FOR SOLUTION ORAL at 21:12

## 2024-01-01 RX ADMIN — ACETYLCYSTEINE 4 ML: 100 SOLUTION ORAL; RESPIRATORY (INHALATION) at 08:49

## 2024-01-01 RX ADMIN — RAMELTEON 8 MG: 8 TABLET ORAL at 20:59

## 2024-01-01 RX ADMIN — EPOETIN ALFA-EPBX 4000 UNITS: 10000 INJECTION, SOLUTION INTRAVENOUS; SUBCUTANEOUS at 16:06

## 2024-01-01 RX ADMIN — CARVEDILOL 6.25 MG: 6.25 TABLET, FILM COATED ORAL at 21:15

## 2024-01-01 RX ADMIN — RAMELTEON 8 MG: 8 TABLET, FILM COATED ORAL at 20:44

## 2024-01-01 RX ADMIN — ACETYLCYSTEINE 2 ML: 200 SOLUTION ORAL; RESPIRATORY (INHALATION) at 09:06

## 2024-01-01 RX ADMIN — Medication 5 ML: at 09:05

## 2024-01-01 RX ADMIN — SODIUM CHLORIDE SOLN NEBU 3% 3 ML: 3 NEBU SOLN at 11:53

## 2024-01-01 RX ADMIN — HYDROMORPHONE HYDROCHLORIDE 0.5 MG: 1 INJECTION, SOLUTION INTRAMUSCULAR; INTRAVENOUS; SUBCUTANEOUS at 01:48

## 2024-01-01 RX ADMIN — LEVALBUTEROL HYDROCHLORIDE 0.63 MG: 0.63 SOLUTION RESPIRATORY (INHALATION) at 12:42

## 2024-01-01 RX ADMIN — RAMELTEON 8 MG: 8 TABLET, FILM COATED ORAL at 20:55

## 2024-01-01 RX ADMIN — ACETAMINOPHEN 975 MG: 325 TABLET, FILM COATED ORAL at 13:47

## 2024-01-01 RX ADMIN — SODIUM CHLORIDE 300 ML: 9 INJECTION, SOLUTION INTRAVENOUS at 14:18

## 2024-01-01 RX ADMIN — QUETIAPINE FUMARATE 25 MG: 25 TABLET ORAL at 20:12

## 2024-01-01 RX ADMIN — TACROLIMUS 0.8 MG: 5 CAPSULE ORAL at 18:00

## 2024-01-01 RX ADMIN — Medication 40 MG: at 14:05

## 2024-01-01 RX ADMIN — HEPARIN SODIUM 5000 UNITS: 5000 INJECTION, SOLUTION INTRAVENOUS; SUBCUTANEOUS at 21:12

## 2024-01-01 RX ADMIN — METOPROLOL TARTRATE 25 MG: 25 TABLET, FILM COATED ORAL at 08:38

## 2024-01-01 RX ADMIN — IODIXANOL 30 ML: 320 INJECTION, SOLUTION INTRAVASCULAR at 13:21

## 2024-01-01 RX ADMIN — LORAZEPAM 1 MG: 2 LIQUID ORAL at 12:48

## 2024-01-01 RX ADMIN — INSULIN ASPART 5 UNITS: 100 INJECTION, SOLUTION INTRAVENOUS; SUBCUTANEOUS at 16:17

## 2024-01-01 RX ADMIN — TACROLIMUS 0.8 MG: 5 CAPSULE ORAL at 18:12

## 2024-01-01 RX ADMIN — LEVALBUTEROL HYDROCHLORIDE 0.63 MG: 0.63 SOLUTION RESPIRATORY (INHALATION) at 12:09

## 2024-01-01 RX ADMIN — RAMELTEON 8 MG: 8 TABLET ORAL at 21:12

## 2024-01-01 RX ADMIN — RAMELTEON 8 MG: 8 TABLET ORAL at 19:39

## 2024-01-01 RX ADMIN — INSULIN ASPART 2 UNITS: 100 INJECTION, SOLUTION INTRAVENOUS; SUBCUTANEOUS at 20:15

## 2024-01-01 RX ADMIN — EPOETIN ALFA-EPBX 4000 UNITS: 10000 INJECTION, SOLUTION INTRAVENOUS; SUBCUTANEOUS at 15:31

## 2024-01-01 RX ADMIN — MINOCYCLINE HYDROCHLORIDE 100 MG: 100 INJECTION INTRAVENOUS at 01:27

## 2024-01-01 RX ADMIN — SULFAMETHOXAZOLE AND TRIMETHOPRIM 1 TABLET: 400; 80 TABLET ORAL at 20:10

## 2024-01-01 RX ADMIN — ACETYLCYSTEINE 2 ML: 200 SOLUTION ORAL; RESPIRATORY (INHALATION) at 15:48

## 2024-01-01 RX ADMIN — MEROPENEM 1 G: 1 INJECTION, POWDER, FOR SOLUTION INTRAVENOUS at 16:42

## 2024-01-01 RX ADMIN — LEVALBUTEROL HYDROCHLORIDE 0.63 MG: 0.63 SOLUTION RESPIRATORY (INHALATION) at 15:40

## 2024-01-01 RX ADMIN — TACROLIMUS 0.7 MG: 5 CAPSULE ORAL at 18:10

## 2024-01-01 RX ADMIN — Medication 1 TABLET: at 08:23

## 2024-01-01 RX ADMIN — HEPARIN SODIUM 5000 UNITS: 5000 INJECTION, SOLUTION INTRAVENOUS; SUBCUTANEOUS at 05:53

## 2024-01-01 RX ADMIN — ONDANSETRON 4 MG: 2 INJECTION INTRAMUSCULAR; INTRAVENOUS at 08:27

## 2024-01-01 RX ADMIN — LORAZEPAM 1 MG: 2 LIQUID ORAL at 23:43

## 2024-01-01 RX ADMIN — OXYCODONE HYDROCHLORIDE 5 MG: 100 SOLUTION ORAL at 15:56

## 2024-01-01 RX ADMIN — CALCIUM CHLORIDE, MAGNESIUM CHLORIDE, SODIUM CHLORIDE, SODIUM BICARBONATE, POTASSIUM CHLORIDE AND SODIUM PHOSPHATE DIBASIC DIHYDRATE: 3.68; 3.05; 6.34; 3.09; .314; .187 INJECTION INTRAVENOUS at 14:14

## 2024-01-01 RX ADMIN — Medication 1 MG: at 18:05

## 2024-01-01 RX ADMIN — ACETYLCYSTEINE 2 ML: 200 SOLUTION ORAL; RESPIRATORY (INHALATION) at 15:47

## 2024-01-01 RX ADMIN — SULFAMETHOXAZOLE AND TRIMETHOPRIM 1 TABLET: 400; 80 TABLET ORAL at 19:38

## 2024-01-01 RX ADMIN — POLYETHYLENE GLYCOL 3350 17 G: 17 POWDER, FOR SOLUTION ORAL at 15:31

## 2024-01-01 RX ADMIN — MICAFUNGIN SODIUM 150 MG: 50 INJECTION, POWDER, LYOPHILIZED, FOR SOLUTION INTRAVENOUS at 00:18

## 2024-01-01 RX ADMIN — SODIUM CHLORIDE SOLN NEBU 3% 3 ML: 3 NEBU SOLN at 19:39

## 2024-01-01 RX ADMIN — MIDAZOLAM 1 MG: 1 INJECTION INTRAMUSCULAR; INTRAVENOUS at 12:31

## 2024-01-01 RX ADMIN — ACETYLCYSTEINE 4 ML: 100 SOLUTION ORAL; RESPIRATORY (INHALATION) at 16:19

## 2024-01-01 RX ADMIN — Medication 50 MCG: at 11:10

## 2024-01-01 RX ADMIN — INSULIN HUMAN 8 UNITS: 100 INJECTION, SUSPENSION SUBCUTANEOUS at 20:59

## 2024-01-01 RX ADMIN — CHLORHEXIDINE GLUCONATE 0.12% ORAL RINSE 15 ML: 1.2 LIQUID ORAL at 21:00

## 2024-01-01 RX ADMIN — Medication 25 MG: at 23:20

## 2024-01-01 RX ADMIN — QUETIAPINE FUMARATE 150 MG: 50 TABLET ORAL at 21:53

## 2024-01-01 RX ADMIN — CHLORHEXIDINE GLUCONATE 0.12% ORAL RINSE 15 ML: 1.2 LIQUID ORAL at 19:33

## 2024-01-01 RX ADMIN — DEXMEDETOMIDINE HYDROCHLORIDE 0.5 MCG/KG/HR: 4 INJECTION, SOLUTION INTRAVENOUS at 11:03

## 2024-01-01 RX ADMIN — QUETIAPINE FUMARATE 25 MG: 25 TABLET ORAL at 21:57

## 2024-01-01 RX ADMIN — SUGAMMADEX 150 MG: 100 INJECTION, SOLUTION INTRAVENOUS at 08:27

## 2024-01-01 RX ADMIN — LEVALBUTEROL HYDROCHLORIDE 0.63 MG: 0.63 SOLUTION RESPIRATORY (INHALATION) at 08:45

## 2024-01-01 RX ADMIN — ACETYLCYSTEINE 4 ML: 100 SOLUTION ORAL; RESPIRATORY (INHALATION) at 16:12

## 2024-01-01 RX ADMIN — FILGRASTIM-AAFI 300 MCG: 300 INJECTION, SOLUTION INTRAVENOUS; SUBCUTANEOUS at 13:32

## 2024-01-01 RX ADMIN — INSULIN HUMAN 8 UNITS: 100 INJECTION, SUSPENSION SUBCUTANEOUS at 20:29

## 2024-01-01 RX ADMIN — Medication 50 MCG: at 08:34

## 2024-01-01 RX ADMIN — ACETAMINOPHEN 325 MG: 325 TABLET, FILM COATED ORAL at 23:08

## 2024-01-01 RX ADMIN — LORAZEPAM 1 MG: 2 LIQUID ORAL at 08:27

## 2024-01-01 RX ADMIN — LEVALBUTEROL HYDROCHLORIDE 0.63 MG: 0.63 SOLUTION RESPIRATORY (INHALATION) at 20:04

## 2024-01-01 RX ADMIN — INSULIN ASPART 1 UNITS: 100 INJECTION, SOLUTION INTRAVENOUS; SUBCUTANEOUS at 11:51

## 2024-01-01 RX ADMIN — HEPARIN SODIUM 5000 UNITS: 5000 INJECTION, SOLUTION INTRAVENOUS; SUBCUTANEOUS at 21:07

## 2024-01-01 RX ADMIN — SODIUM CHLORIDE SOLN NEBU 3% 3 ML: 3 NEBU SOLN at 20:13

## 2024-01-01 RX ADMIN — METOPROLOL TARTRATE 25 MG: 25 TABLET, FILM COATED ORAL at 08:14

## 2024-01-01 RX ADMIN — MONTELUKAST 10 MG: 10 TABLET, FILM COATED ORAL at 21:53

## 2024-01-01 RX ADMIN — SULFAMETHOXAZOLE AND TRIMETHOPRIM 1 TABLET: 400; 80 TABLET ORAL at 20:05

## 2024-01-01 RX ADMIN — MIDODRINE HYDROCHLORIDE 15 MG: 5 TABLET ORAL at 08:45

## 2024-01-01 RX ADMIN — MINOCYCLINE HYDROCHLORIDE 100 MG: 100 INJECTION INTRAVENOUS at 12:28

## 2024-01-01 RX ADMIN — Medication 5 ML: at 08:29

## 2024-01-01 RX ADMIN — LIDOCAINE HYDROCHLORIDE 60 MG: 20 INJECTION, SOLUTION INFILTRATION; PERINEURAL at 07:52

## 2024-01-01 RX ADMIN — CALCIUM CARBONATE (ANTACID) CHEW TAB 500 MG 500 MG: 500 CHEW TAB at 08:47

## 2024-01-01 RX ADMIN — CARVEDILOL 6.25 MG: 6.25 TABLET, FILM COATED ORAL at 21:35

## 2024-01-01 RX ADMIN — MONTELUKAST 10 MG: 10 TABLET, FILM COATED ORAL at 23:11

## 2024-01-01 RX ADMIN — ACETYLCYSTEINE 4 ML: 100 SOLUTION ORAL; RESPIRATORY (INHALATION) at 16:00

## 2024-01-01 RX ADMIN — LEVALBUTEROL HYDROCHLORIDE 0.63 MG: 0.63 SOLUTION RESPIRATORY (INHALATION) at 21:53

## 2024-01-01 RX ADMIN — ACETYLCYSTEINE 4 ML: 100 SOLUTION ORAL; RESPIRATORY (INHALATION) at 09:54

## 2024-01-01 RX ADMIN — LEVALBUTEROL HYDROCHLORIDE 0.63 MG: 0.63 SOLUTION RESPIRATORY (INHALATION) at 17:30

## 2024-01-01 RX ADMIN — PREDNISONE 40 MG: 20 TABLET ORAL at 08:47

## 2024-01-01 RX ADMIN — HEPARIN SODIUM 5000 UNITS: 5000 INJECTION, SOLUTION INTRAVENOUS; SUBCUTANEOUS at 21:06

## 2024-01-01 RX ADMIN — HYDROMORPHONE HYDROCHLORIDE 0.5 MG: 1 INJECTION, SOLUTION INTRAMUSCULAR; INTRAVENOUS; SUBCUTANEOUS at 04:22

## 2024-01-01 RX ADMIN — INSULIN ASPART 2 UNITS: 100 INJECTION, SOLUTION INTRAVENOUS; SUBCUTANEOUS at 21:05

## 2024-01-01 RX ADMIN — Medication 5 ML: at 07:48

## 2024-01-01 RX ADMIN — DEXMEDETOMIDINE HYDROCHLORIDE 1.2 MCG/KG/HR: 4 INJECTION, SOLUTION INTRAVENOUS at 02:28

## 2024-01-01 RX ADMIN — SODIUM CHLORIDE SOLN NEBU 3% 3 ML: 3 NEBU SOLN at 13:56

## 2024-01-01 RX ADMIN — ACETAMINOPHEN 975 MG: 325 TABLET, FILM COATED ORAL at 05:57

## 2024-01-01 RX ADMIN — CHLORHEXIDINE GLUCONATE 0.12% ORAL RINSE 15 ML: 1.2 LIQUID ORAL at 20:58

## 2024-01-01 RX ADMIN — ACETAMINOPHEN 325 MG: 325 TABLET, FILM COATED ORAL at 23:31

## 2024-01-01 RX ADMIN — DEXTROSE MONOHYDRATE 1000 ML: 100 INJECTION, SOLUTION INTRAVENOUS at 00:39

## 2024-01-01 RX ADMIN — MONTELUKAST 10 MG: 10 TABLET, FILM COATED ORAL at 22:28

## 2024-01-01 RX ADMIN — POTASSIUM CHLORIDE 10 MEQ: 7.46 INJECTION, SOLUTION INTRAVENOUS at 08:14

## 2024-01-01 RX ADMIN — Medication 50 MCG: at 06:11

## 2024-01-01 RX ADMIN — PREDNISONE 5 MG: 5 TABLET ORAL at 07:38

## 2024-01-01 RX ADMIN — PREDNISONE 40 MG: 20 TABLET ORAL at 08:35

## 2024-01-01 RX ADMIN — CHLORHEXIDINE GLUCONATE 0.12% ORAL RINSE 15 ML: 1.2 LIQUID ORAL at 07:39

## 2024-01-01 RX ADMIN — RAMELTEON 8 MG: 8 TABLET, FILM COATED ORAL at 20:56

## 2024-01-01 RX ADMIN — INSULIN HUMAN 8 UNITS: 100 INJECTION, SUSPENSION SUBCUTANEOUS at 08:16

## 2024-01-01 RX ADMIN — DEXMEDETOMIDINE HYDROCHLORIDE 1.2 MCG/KG/HR: 4 INJECTION, SOLUTION INTRAVENOUS at 20:12

## 2024-01-01 RX ADMIN — CHLORHEXIDINE GLUCONATE 0.12% ORAL RINSE 15 ML: 1.2 LIQUID ORAL at 08:51

## 2024-01-01 RX ADMIN — CALCIUM CHLORIDE, MAGNESIUM CHLORIDE, SODIUM CHLORIDE, SODIUM BICARBONATE, POTASSIUM CHLORIDE AND SODIUM PHOSPHATE DIBASIC DIHYDRATE 12.5 ML/KG/HR: 3.68; 3.05; 6.34; 3.09; .314; .187 INJECTION INTRAVENOUS at 04:53

## 2024-01-01 RX ADMIN — SODIUM CHLORIDE SOLN NEBU 3% 3 ML: 3 NEBU SOLN at 21:11

## 2024-01-01 RX ADMIN — QUETIAPINE FUMARATE 50 MG: 50 TABLET ORAL at 00:04

## 2024-01-01 RX ADMIN — CALCIUM CHLORIDE, MAGNESIUM CHLORIDE, SODIUM CHLORIDE, SODIUM BICARBONATE, POTASSIUM CHLORIDE AND SODIUM PHOSPHATE DIBASIC DIHYDRATE 12.5 ML/KG/HR: 3.68; 3.05; 6.34; 3.09; .314; .187 INJECTION INTRAVENOUS at 07:49

## 2024-01-01 RX ADMIN — SODIUM CHLORIDE SOLN NEBU 3% 3 ML: 3 NEBU SOLN at 21:37

## 2024-01-01 RX ADMIN — EPOETIN ALFA-EPBX 4000 UNITS: 10000 INJECTION, SOLUTION INTRAVENOUS; SUBCUTANEOUS at 17:45

## 2024-01-01 RX ADMIN — HEPARIN SODIUM 5000 UNITS: 5000 INJECTION, SOLUTION INTRAVENOUS; SUBCUTANEOUS at 06:13

## 2024-01-01 RX ADMIN — SODIUM CHLORIDE SOLN NEBU 3% 3 ML: 3 NEBU SOLN at 19:35

## 2024-01-01 RX ADMIN — INSULIN ASPART 1 UNITS: 100 INJECTION, SOLUTION INTRAVENOUS; SUBCUTANEOUS at 23:00

## 2024-01-01 RX ADMIN — HEPARIN SODIUM 5000 UNITS: 5000 INJECTION, SOLUTION INTRAVENOUS; SUBCUTANEOUS at 21:00

## 2024-01-01 RX ADMIN — INSULIN HUMAN 8 UNITS: 100 INJECTION, SUSPENSION SUBCUTANEOUS at 20:20

## 2024-01-01 RX ADMIN — ACETYLCYSTEINE 4 ML: 100 SOLUTION ORAL; RESPIRATORY (INHALATION) at 09:21

## 2024-01-01 RX ADMIN — HYDROCORTISONE SODIUM SUCCINATE 50 MG: 100 INJECTION, POWDER, FOR SOLUTION INTRAMUSCULAR; INTRAVENOUS at 22:27

## 2024-01-01 RX ADMIN — POSACONAZOLE 300 MG: 100 TABLET, DELAYED RELEASE ORAL at 12:46

## 2024-01-01 RX ADMIN — HEPARIN SODIUM 500 UNITS: 1000 INJECTION INTRAVENOUS; SUBCUTANEOUS at 19:00

## 2024-01-01 RX ADMIN — TACROLIMUS 0.5 MG: 5 CAPSULE ORAL at 17:38

## 2024-01-01 RX ADMIN — SODIUM CHLORIDE SOLN NEBU 3% 3 ML: 3 NEBU SOLN at 13:52

## 2024-01-01 RX ADMIN — LIDOCAINE HYDROCHLORIDE 20 ML: 10 INJECTION, SOLUTION EPIDURAL; INFILTRATION; INTRACAUDAL; PERINEURAL at 09:46

## 2024-01-01 RX ADMIN — DEXMEDETOMIDINE HYDROCHLORIDE 1.1 MCG/KG/HR: 4 INJECTION, SOLUTION INTRAVENOUS at 09:14

## 2024-01-01 RX ADMIN — HEPARIN SODIUM 5000 UNITS: 5000 INJECTION, SOLUTION INTRAVENOUS; SUBCUTANEOUS at 05:44

## 2024-01-01 RX ADMIN — LEVALBUTEROL HYDROCHLORIDE 0.63 MG: 0.63 SOLUTION RESPIRATORY (INHALATION) at 12:26

## 2024-01-01 RX ADMIN — PREDNISONE 5 MG: 5 TABLET ORAL at 07:28

## 2024-01-01 RX ADMIN — HEPARIN SODIUM 5000 UNITS: 5000 INJECTION, SOLUTION INTRAVENOUS; SUBCUTANEOUS at 06:54

## 2024-01-01 RX ADMIN — LEVALBUTEROL HYDROCHLORIDE 0.63 MG: 0.63 SOLUTION RESPIRATORY (INHALATION) at 12:10

## 2024-01-01 RX ADMIN — POSACONAZOLE 300 MG: 100 TABLET, DELAYED RELEASE ORAL at 13:48

## 2024-01-01 RX ADMIN — MINOCYCLINE HYDROCHLORIDE 100 MG: 100 INJECTION INTRAVENOUS at 14:08

## 2024-01-01 RX ADMIN — TACROLIMUS 0.5 MG: 5 CAPSULE ORAL at 07:40

## 2024-01-01 RX ADMIN — ACETAMINOPHEN 975 MG: 325 TABLET, FILM COATED ORAL at 14:04

## 2024-01-01 RX ADMIN — HEPARIN SODIUM 500 UNITS/HR: 1000 INJECTION INTRAVENOUS; SUBCUTANEOUS at 08:08

## 2024-01-01 RX ADMIN — METOPROLOL TARTRATE 25 MG: 25 TABLET, FILM COATED ORAL at 08:08

## 2024-01-01 RX ADMIN — LEVALBUTEROL HYDROCHLORIDE 0.63 MG: 0.63 SOLUTION RESPIRATORY (INHALATION) at 20:05

## 2024-01-01 RX ADMIN — LEVALBUTEROL HYDROCHLORIDE 0.63 MG: 0.63 SOLUTION RESPIRATORY (INHALATION) at 09:26

## 2024-01-01 RX ADMIN — CALCIUM CHLORIDE, MAGNESIUM CHLORIDE, SODIUM CHLORIDE, SODIUM BICARBONATE, POTASSIUM CHLORIDE AND SODIUM PHOSPHATE DIBASIC DIHYDRATE 12.5 ML/KG/HR: 3.68; 3.05; 6.34; 3.09; .314; .187 INJECTION INTRAVENOUS at 18:52

## 2024-01-01 RX ADMIN — METOPROLOL TARTRATE 25 MG: 25 TABLET, FILM COATED ORAL at 21:00

## 2024-01-01 RX ADMIN — MIDAZOLAM 1 MG: 1 INJECTION INTRAMUSCULAR; INTRAVENOUS at 12:43

## 2024-01-01 RX ADMIN — INSULIN HUMAN 8 UNITS: 100 INJECTION, SUSPENSION SUBCUTANEOUS at 08:23

## 2024-01-01 RX ADMIN — SODIUM CHLORIDE SOLN NEBU 3% 3 ML: 3 NEBU SOLN at 11:21

## 2024-01-01 RX ADMIN — Medication 5 ML: at 08:49

## 2024-01-01 RX ADMIN — POTASSIUM CHLORIDE 40 MEQ: 40 SOLUTION ORAL at 18:04

## 2024-01-01 RX ADMIN — SODIUM PHOSPHATE, MONOBASIC, MONOHYDRATE AND SODIUM PHOSPHATE, DIBASIC, ANHYDROUS 15 MMOL: 142; 276 INJECTION, SOLUTION INTRAVENOUS at 19:52

## 2024-01-01 RX ADMIN — MONTELUKAST 10 MG: 10 TABLET, FILM COATED ORAL at 21:33

## 2024-01-01 RX ADMIN — DEXTROSE MONOHYDRATE 1000 ML: 100 INJECTION, SOLUTION INTRAVENOUS at 00:11

## 2024-01-01 RX ADMIN — CARVEDILOL 6.25 MG: 6.25 TABLET, FILM COATED ORAL at 21:23

## 2024-01-01 RX ADMIN — CHLORHEXIDINE GLUCONATE 0.12% ORAL RINSE 15 ML: 1.2 LIQUID ORAL at 08:23

## 2024-01-01 RX ADMIN — LINEZOLID 600 MG: 600 TABLET, FILM COATED ORAL at 12:40

## 2024-01-01 RX ADMIN — POSACONAZOLE 300 MG: 100 TABLET, DELAYED RELEASE ORAL at 14:00

## 2024-01-01 RX ADMIN — LORAZEPAM 1 MG: 2 LIQUID ORAL at 05:44

## 2024-01-01 RX ADMIN — SODIUM CHLORIDE SOLN NEBU 3% 3 ML: 3 NEBU SOLN at 11:44

## 2024-01-01 RX ADMIN — NOREPINEPHRINE BITARTRATE 0.21 MCG/KG/MIN: 0.06 INJECTION, SOLUTION INTRAVENOUS at 15:49

## 2024-01-01 RX ADMIN — MONTELUKAST 10 MG: 10 TABLET, FILM COATED ORAL at 22:21

## 2024-01-01 RX ADMIN — ACETAMINOPHEN 650 MG: 325 TABLET, FILM COATED ORAL at 08:49

## 2024-01-01 RX ADMIN — NOREPINEPHRINE BITARTRATE 0.08 MCG/KG/MIN: 0.06 INJECTION, SOLUTION INTRAVENOUS at 00:06

## 2024-01-01 RX ADMIN — SULFAMETHOXAZOLE AND TRIMETHOPRIM 1 TABLET: 400; 80 TABLET ORAL at 20:22

## 2024-01-01 RX ADMIN — Medication 40 MG: at 20:56

## 2024-01-01 RX ADMIN — OLANZAPINE 5 MG: 2.5 TABLET, FILM COATED ORAL at 00:10

## 2024-01-01 RX ADMIN — LEVALBUTEROL HYDROCHLORIDE 0.63 MG: 0.63 SOLUTION RESPIRATORY (INHALATION) at 15:44

## 2024-01-01 RX ADMIN — FLUDROCORTISONE ACETATE 0.1 MG: 0.1 TABLET ORAL at 08:51

## 2024-01-01 RX ADMIN — MONTELUKAST 10 MG: 10 TABLET, FILM COATED ORAL at 20:43

## 2024-01-01 RX ADMIN — CHLORHEXIDINE GLUCONATE 0.12% ORAL RINSE 15 ML: 1.2 LIQUID ORAL at 21:11

## 2024-01-01 RX ADMIN — Medication 25 MG: at 21:28

## 2024-01-01 RX ADMIN — LEVALBUTEROL HYDROCHLORIDE 0.63 MG: 0.63 SOLUTION RESPIRATORY (INHALATION) at 15:51

## 2024-01-01 RX ADMIN — Medication 40 MG: at 21:01

## 2024-01-01 RX ADMIN — Medication 50 MCG: at 12:47

## 2024-01-01 RX ADMIN — Medication 50 MCG: at 08:26

## 2024-01-01 RX ADMIN — Medication 1.8 UNITS/HR: at 04:34

## 2024-01-01 RX ADMIN — LINEZOLID 600 MG: 600 TABLET, FILM COATED ORAL at 23:08

## 2024-01-01 RX ADMIN — POSACONAZOLE 300 MG: 100 TABLET, DELAYED RELEASE ORAL at 13:09

## 2024-01-01 RX ADMIN — INSULIN ASPART 3 UNITS: 100 INJECTION, SOLUTION INTRAVENOUS; SUBCUTANEOUS at 04:06

## 2024-01-01 RX ADMIN — SODIUM BICARBONATE 325 MG: 650 TABLET ORAL at 13:48

## 2024-01-01 RX ADMIN — CHLORHEXIDINE GLUCONATE 0.12% ORAL RINSE 15 ML: 1.2 LIQUID ORAL at 20:06

## 2024-01-01 RX ADMIN — INSULIN ASPART 1 UNITS: 100 INJECTION, SOLUTION INTRAVENOUS; SUBCUTANEOUS at 20:36

## 2024-01-01 RX ADMIN — LORAZEPAM 1 MG: 2 LIQUID ORAL at 18:55

## 2024-01-01 RX ADMIN — QUETIAPINE FUMARATE 25 MG: 25 TABLET ORAL at 13:07

## 2024-01-01 RX ADMIN — SODIUM CHLORIDE SOLN NEBU 3% 3 ML: 3 NEBU SOLN at 20:05

## 2024-01-01 RX ADMIN — SODIUM CHLORIDE 250 ML: 9 INJECTION, SOLUTION INTRAVENOUS at 07:56

## 2024-01-01 RX ADMIN — LINEZOLID 600 MG: 600 INJECTION, SOLUTION INTRAVENOUS at 00:28

## 2024-01-01 RX ADMIN — Medication 5 ML: at 08:01

## 2024-01-01 RX ADMIN — SODIUM CHLORIDE SOLN NEBU 3% 3 ML: 3 NEBU SOLN at 11:55

## 2024-01-01 RX ADMIN — HEPARIN SODIUM 5000 UNITS: 5000 INJECTION, SOLUTION INTRAVENOUS; SUBCUTANEOUS at 06:21

## 2024-01-01 RX ADMIN — LEVALBUTEROL HYDROCHLORIDE 0.63 MG: 0.63 SOLUTION RESPIRATORY (INHALATION) at 07:34

## 2024-01-01 RX ADMIN — Medication 40 MG: at 20:52

## 2024-01-01 RX ADMIN — MIDODRINE HYDROCHLORIDE 20 MG: 5 TABLET ORAL at 13:17

## 2024-01-01 RX ADMIN — Medication 10 MG: at 23:13

## 2024-01-01 RX ADMIN — RAMELTEON 8 MG: 8 TABLET ORAL at 21:25

## 2024-01-01 RX ADMIN — SODIUM CHLORIDE SOLN NEBU 3% 3 ML: 3 NEBU SOLN at 11:28

## 2024-01-01 RX ADMIN — ACETYLCYSTEINE 4 ML: 100 SOLUTION ORAL; RESPIRATORY (INHALATION) at 15:31

## 2024-01-01 RX ADMIN — MIDAZOLAM 0.5 MG: 1 INJECTION INTRAMUSCULAR; INTRAVENOUS at 11:01

## 2024-01-01 RX ADMIN — MIDODRINE HYDROCHLORIDE 15 MG: 5 TABLET ORAL at 12:10

## 2024-01-01 RX ADMIN — Medication 40 MG: at 20:08

## 2024-01-01 RX ADMIN — LORAZEPAM 0.5 MG: 2 LIQUID ORAL at 00:08

## 2024-01-01 RX ADMIN — CHLORHEXIDINE GLUCONATE 0.12% ORAL RINSE 15 ML: 1.2 LIQUID ORAL at 07:59

## 2024-01-01 RX ADMIN — HYDROCORTISONE SODIUM SUCCINATE 50 MG: 100 INJECTION, POWDER, FOR SOLUTION INTRAMUSCULAR; INTRAVENOUS at 10:08

## 2024-01-01 RX ADMIN — DOCUSATE SODIUM 50 MG AND SENNOSIDES 8.6 MG 1 TABLET: 8.6; 5 TABLET, FILM COATED ORAL at 19:33

## 2024-01-01 RX ADMIN — Medication 125 MG: at 08:04

## 2024-01-01 RX ADMIN — DEXMEDETOMIDINE HYDROCHLORIDE 1.2 MCG/KG/HR: 4 INJECTION, SOLUTION INTRAVENOUS at 23:02

## 2024-01-01 RX ADMIN — LEVALBUTEROL HYDROCHLORIDE 0.63 MG: 0.63 SOLUTION RESPIRATORY (INHALATION) at 08:34

## 2024-01-01 RX ADMIN — MIDODRINE HYDROCHLORIDE 15 MG: 5 TABLET ORAL at 14:56

## 2024-01-01 RX ADMIN — INSULIN ASPART 1 UNITS: 100 INJECTION, SOLUTION INTRAVENOUS; SUBCUTANEOUS at 20:23

## 2024-01-01 RX ADMIN — Medication 40 MG: at 20:45

## 2024-01-01 RX ADMIN — Medication 10 MG: at 11:08

## 2024-01-01 RX ADMIN — INSULIN HUMAN 8 UNITS: 100 INJECTION, SUSPENSION SUBCUTANEOUS at 08:30

## 2024-01-01 RX ADMIN — ACETAMINOPHEN 325 MG: 325 TABLET, FILM COATED ORAL at 18:08

## 2024-01-01 RX ADMIN — RAMELTEON 8 MG: 8 TABLET, FILM COATED ORAL at 21:19

## 2024-01-01 RX ADMIN — SODIUM CHLORIDE SOLN NEBU 3% 3 ML: 3 NEBU SOLN at 20:54

## 2024-01-01 RX ADMIN — HEPARIN SODIUM 5000 UNITS: 5000 INJECTION, SOLUTION INTRAVENOUS; SUBCUTANEOUS at 21:59

## 2024-01-01 RX ADMIN — HEPARIN SODIUM 5000 UNITS: 5000 INJECTION, SOLUTION INTRAVENOUS; SUBCUTANEOUS at 21:03

## 2024-01-01 RX ADMIN — ESCITALOPRAM 10 MG: 5 SOLUTION ORAL at 08:50

## 2024-01-01 RX ADMIN — CARVEDILOL 6.25 MG: 6.25 TABLET, FILM COATED ORAL at 08:51

## 2024-01-01 RX ADMIN — LEVALBUTEROL HYDROCHLORIDE 0.63 MG: 0.63 SOLUTION RESPIRATORY (INHALATION) at 16:40

## 2024-01-01 RX ADMIN — LEVALBUTEROL HYDROCHLORIDE 0.63 MG: 0.63 SOLUTION RESPIRATORY (INHALATION) at 20:54

## 2024-01-01 RX ADMIN — ACETYLCYSTEINE 4 ML: 100 SOLUTION ORAL; RESPIRATORY (INHALATION) at 15:48

## 2024-01-01 RX ADMIN — Medication 50 MCG: at 09:13

## 2024-01-01 RX ADMIN — HEPARIN SODIUM 5000 UNITS: 5000 INJECTION, SOLUTION INTRAVENOUS; SUBCUTANEOUS at 12:00

## 2024-01-01 RX ADMIN — Medication 4 UNITS/HR: at 11:56

## 2024-01-01 RX ADMIN — CALCIUM CHLORIDE, MAGNESIUM CHLORIDE, SODIUM CHLORIDE, SODIUM BICARBONATE, POTASSIUM CHLORIDE AND SODIUM PHOSPHATE DIBASIC DIHYDRATE 12.5 ML/KG/HR: 3.68; 3.05; 6.34; 3.09; .314; .187 INJECTION INTRAVENOUS at 06:19

## 2024-01-01 RX ADMIN — Medication 1 PACKET: at 15:43

## 2024-01-01 RX ADMIN — CALCIUM CHLORIDE, MAGNESIUM CHLORIDE, SODIUM CHLORIDE, SODIUM BICARBONATE, POTASSIUM CHLORIDE AND SODIUM PHOSPHATE DIBASIC DIHYDRATE 12.5 ML/KG/HR: 3.68; 3.05; 6.34; 3.09; .314; .187 INJECTION INTRAVENOUS at 13:14

## 2024-01-01 RX ADMIN — QUETIAPINE FUMARATE 25 MG: 25 TABLET ORAL at 20:56

## 2024-01-01 RX ADMIN — MIDODRINE HYDROCHLORIDE 10 MG: 5 TABLET ORAL at 14:04

## 2024-01-01 RX ADMIN — FLUDROCORTISONE ACETATE 0.1 MG: 0.1 TABLET ORAL at 08:06

## 2024-01-01 RX ADMIN — TRAZODONE HYDROCHLORIDE 50 MG: 50 TABLET ORAL at 20:45

## 2024-01-01 RX ADMIN — ACETYLCYSTEINE 4 ML: 100 SOLUTION ORAL; RESPIRATORY (INHALATION) at 16:57

## 2024-01-01 RX ADMIN — RAMELTEON 8 MG: 8 TABLET, FILM COATED ORAL at 20:51

## 2024-01-01 RX ADMIN — INSULIN ASPART 3 UNITS: 100 INJECTION, SOLUTION INTRAVENOUS; SUBCUTANEOUS at 20:16

## 2024-01-01 RX ADMIN — FILGRASTIM-AAFI 300 MCG: 300 INJECTION, SOLUTION INTRAVENOUS; SUBCUTANEOUS at 16:41

## 2024-01-01 RX ADMIN — ACETAMINOPHEN 325 MG: 325 TABLET, FILM COATED ORAL at 21:06

## 2024-01-01 RX ADMIN — ACETAMINOPHEN 325 MG: 325 TABLET, FILM COATED ORAL at 14:04

## 2024-01-01 RX ADMIN — INSULIN ASPART 1 UNITS: 100 INJECTION, SOLUTION INTRAVENOUS; SUBCUTANEOUS at 16:22

## 2024-01-01 RX ADMIN — CARVEDILOL 6.25 MG: 6.25 TABLET, FILM COATED ORAL at 21:27

## 2024-01-01 RX ADMIN — Medication 1 MG: at 08:48

## 2024-01-01 RX ADMIN — Medication 40 MG: at 20:40

## 2024-01-01 RX ADMIN — SODIUM CHLORIDE 250 ML: 9 INJECTION, SOLUTION INTRAVENOUS at 15:56

## 2024-01-01 RX ADMIN — INSULIN HUMAN 8 UNITS: 100 INJECTION, SUSPENSION SUBCUTANEOUS at 08:10

## 2024-01-01 RX ADMIN — POSACONAZOLE 300 MG: 100 TABLET, DELAYED RELEASE ORAL at 13:49

## 2024-01-01 RX ADMIN — LORAZEPAM 0.5 MG: 2 INJECTION INTRAMUSCULAR; INTRAVENOUS at 16:04

## 2024-01-01 RX ADMIN — CALCIUM CHLORIDE, MAGNESIUM CHLORIDE, SODIUM CHLORIDE, SODIUM BICARBONATE, POTASSIUM CHLORIDE AND SODIUM PHOSPHATE DIBASIC DIHYDRATE 12.5 ML/KG/HR: 3.68; 3.05; 6.34; 3.09; .314; .187 INJECTION INTRAVENOUS at 12:40

## 2024-01-01 RX ADMIN — LORAZEPAM 0.5 MG: 2 LIQUID ORAL at 00:29

## 2024-01-01 RX ADMIN — CALCIUM CHLORIDE, MAGNESIUM CHLORIDE, SODIUM CHLORIDE, SODIUM BICARBONATE, POTASSIUM CHLORIDE AND SODIUM PHOSPHATE DIBASIC DIHYDRATE 12.5 ML/KG/HR: 3.68; 3.05; 6.34; 3.09; .314; .187 INJECTION INTRAVENOUS at 22:46

## 2024-01-01 RX ADMIN — CHLORHEXIDINE GLUCONATE 0.12% ORAL RINSE 15 ML: 1.2 LIQUID ORAL at 20:15

## 2024-01-01 RX ADMIN — PREDNISONE 5 MG: 5 TABLET ORAL at 20:44

## 2024-01-01 RX ADMIN — Medication 1 PACKET: at 09:03

## 2024-01-01 RX ADMIN — PHENYLEPHRINE HYDROCHLORIDE 100 MCG: 10 INJECTION INTRAVENOUS at 14:02

## 2024-01-01 RX ADMIN — RAMELTEON 8 MG: 8 TABLET ORAL at 20:08

## 2024-01-01 RX ADMIN — Medication 40 MG: at 12:04

## 2024-01-01 RX ADMIN — ACETAMINOPHEN 325 MG: 325 TABLET, FILM COATED ORAL at 18:00

## 2024-01-01 RX ADMIN — Medication 1 PACKET: at 07:31

## 2024-01-01 RX ADMIN — LEVALBUTEROL HYDROCHLORIDE 0.63 MG: 0.63 SOLUTION RESPIRATORY (INHALATION) at 08:24

## 2024-01-01 RX ADMIN — DEXMEDETOMIDINE HYDROCHLORIDE 0.5 MCG/KG/HR: 4 INJECTION, SOLUTION INTRAVENOUS at 11:34

## 2024-01-01 RX ADMIN — LIDOCAINE HYDROCHLORIDE 30 MG: 10 INJECTION, SOLUTION EPIDURAL; INFILTRATION; INTRACAUDAL; PERINEURAL at 12:38

## 2024-01-01 RX ADMIN — Medication 5 ML: at 08:44

## 2024-01-01 RX ADMIN — Medication 60 ML: at 09:14

## 2024-01-01 RX ADMIN — LEVALBUTEROL HYDROCHLORIDE 0.63 MG: 0.63 SOLUTION RESPIRATORY (INHALATION) at 21:36

## 2024-01-01 RX ADMIN — CALCIUM CARBONATE (ANTACID) CHEW TAB 500 MG 500 MG: 500 CHEW TAB at 09:10

## 2024-01-01 RX ADMIN — ACETYLCYSTEINE 2 ML: 200 SOLUTION ORAL; RESPIRATORY (INHALATION) at 07:47

## 2024-01-01 RX ADMIN — CARVEDILOL 6.25 MG: 6.25 TABLET, FILM COATED ORAL at 08:50

## 2024-01-01 RX ADMIN — DEXMEDETOMIDINE HYDROCHLORIDE 1 MCG/KG/HR: 4 INJECTION, SOLUTION INTRAVENOUS at 12:50

## 2024-01-01 RX ADMIN — SODIUM CHLORIDE SOLN NEBU 3% 3 ML: 3 NEBU SOLN at 09:21

## 2024-01-01 RX ADMIN — CARVEDILOL 6.25 MG: 6.25 TABLET, FILM COATED ORAL at 08:57

## 2024-01-01 RX ADMIN — CALCIUM CHLORIDE, MAGNESIUM CHLORIDE, SODIUM CHLORIDE, SODIUM BICARBONATE, POTASSIUM CHLORIDE AND SODIUM PHOSPHATE DIBASIC DIHYDRATE 12.5 ML/KG/HR: 3.68; 3.05; 6.34; 3.09; .314; .187 INJECTION INTRAVENOUS at 22:20

## 2024-01-01 RX ADMIN — POSACONAZOLE 300 MG: 100 TABLET, DELAYED RELEASE ORAL at 12:14

## 2024-01-01 RX ADMIN — SODIUM CHLORIDE SOLN NEBU 3% 3 ML: 3 NEBU SOLN at 20:57

## 2024-01-01 RX ADMIN — VALGANCICLOVIR HYDROCHLORIDE 450 MG: 50 POWDER, FOR SOLUTION ORAL at 20:14

## 2024-01-01 RX ADMIN — Medication 1 MG: at 08:33

## 2024-01-01 RX ADMIN — METOPROLOL TARTRATE 25 MG: 25 TABLET, FILM COATED ORAL at 20:18

## 2024-01-01 RX ADMIN — CALCIUM CHLORIDE, MAGNESIUM CHLORIDE, SODIUM CHLORIDE, SODIUM BICARBONATE, POTASSIUM CHLORIDE AND SODIUM PHOSPHATE DIBASIC DIHYDRATE 12.5 ML/KG/HR: 3.68; 3.05; 6.34; 3.09; .314; .187 INJECTION INTRAVENOUS at 11:30

## 2024-01-01 RX ADMIN — Medication 1 PACKET: at 05:13

## 2024-01-01 RX ADMIN — MINOCYCLINE HYDROCHLORIDE 100 MG: 100 INJECTION INTRAVENOUS at 23:28

## 2024-01-01 RX ADMIN — Medication 1 PACKET: at 07:29

## 2024-01-01 RX ADMIN — CALCIUM GLUCONATE 2 G: 20 INJECTION, SOLUTION INTRAVENOUS at 17:31

## 2024-01-01 RX ADMIN — INSULIN ASPART 4 UNITS: 100 INJECTION, SOLUTION INTRAVENOUS; SUBCUTANEOUS at 12:35

## 2024-01-01 RX ADMIN — TACROLIMUS 1.5 MG: 5 CAPSULE ORAL at 18:16

## 2024-01-01 RX ADMIN — PREDNISONE 40 MG: 20 TABLET ORAL at 09:13

## 2024-01-01 RX ADMIN — HYDROMORPHONE HYDROCHLORIDE 0.5 MG: 1 INJECTION, SOLUTION INTRAMUSCULAR; INTRAVENOUS; SUBCUTANEOUS at 18:19

## 2024-01-01 RX ADMIN — Medication 200 MG: at 12:45

## 2024-01-01 RX ADMIN — METOPROLOL TARTRATE 25 MG: 25 TABLET, FILM COATED ORAL at 20:05

## 2024-01-01 RX ADMIN — LEVALBUTEROL HYDROCHLORIDE 0.63 MG: 0.63 SOLUTION RESPIRATORY (INHALATION) at 20:13

## 2024-01-01 RX ADMIN — RAMELTEON 8 MG: 8 TABLET, FILM COATED ORAL at 21:23

## 2024-01-01 RX ADMIN — ACETAMINOPHEN 325 MG: 325 TABLET, FILM COATED ORAL at 08:06

## 2024-01-01 RX ADMIN — DEXMEDETOMIDINE HYDROCHLORIDE 1 MCG/KG/HR: 4 INJECTION, SOLUTION INTRAVENOUS at 21:42

## 2024-01-01 RX ADMIN — ACETAMINOPHEN 325 MG: 325 TABLET, FILM COATED ORAL at 04:13

## 2024-01-01 RX ADMIN — ACETYLCYSTEINE 4 ML: 100 SOLUTION ORAL; RESPIRATORY (INHALATION) at 08:19

## 2024-01-01 RX ADMIN — IOHEXOL 10 ML: 350 INJECTION, SOLUTION INTRAVENOUS at 09:09

## 2024-01-01 RX ADMIN — SODIUM CHLORIDE SOLN NEBU 3% 3 ML: 3 NEBU SOLN at 19:18

## 2024-01-01 RX ADMIN — DEXMEDETOMIDINE HYDROCHLORIDE 0.5 MCG/KG/HR: 4 INJECTION, SOLUTION INTRAVENOUS at 20:15

## 2024-01-01 RX ADMIN — ACETAMINOPHEN 325 MG: 325 TABLET, FILM COATED ORAL at 14:56

## 2024-01-01 RX ADMIN — RAMELTEON 8 MG: 8 TABLET ORAL at 20:25

## 2024-01-01 RX ADMIN — AZITHROMYCIN 125 MG: 200 POWDER, FOR SUSPENSION PARENTERAL at 08:47

## 2024-01-01 RX ADMIN — LORAZEPAM 1 MG: 2 LIQUID ORAL at 17:23

## 2024-01-01 RX ADMIN — RAMELTEON 8 MG: 8 TABLET ORAL at 22:18

## 2024-01-01 RX ADMIN — HEPARIN SODIUM 5000 UNITS: 5000 INJECTION, SOLUTION INTRAVENOUS; SUBCUTANEOUS at 14:47

## 2024-01-01 RX ADMIN — Medication 40 MG: at 11:38

## 2024-01-01 RX ADMIN — INSULIN ASPART 3 UNITS: 100 INJECTION, SOLUTION INTRAVENOUS; SUBCUTANEOUS at 16:19

## 2024-01-01 RX ADMIN — LEVALBUTEROL HYDROCHLORIDE 0.63 MG: 0.63 SOLUTION RESPIRATORY (INHALATION) at 20:38

## 2024-01-01 RX ADMIN — ACETAMINOPHEN 325 MG: 325 TABLET, FILM COATED ORAL at 12:53

## 2024-01-01 RX ADMIN — ACETYLCYSTEINE 2 ML: 200 SOLUTION ORAL; RESPIRATORY (INHALATION) at 16:06

## 2024-01-01 RX ADMIN — RAMELTEON 8 MG: 8 TABLET ORAL at 21:01

## 2024-01-01 RX ADMIN — ACETAMINOPHEN 325 MG: 325 TABLET, FILM COATED ORAL at 16:12

## 2024-01-01 RX ADMIN — ACETYLCYSTEINE 4 ML: 100 SOLUTION ORAL; RESPIRATORY (INHALATION) at 10:19

## 2024-01-01 RX ADMIN — ONDANSETRON 4 MG: 2 INJECTION INTRAMUSCULAR; INTRAVENOUS at 10:58

## 2024-01-01 RX ADMIN — SODIUM CHLORIDE SOLN NEBU 3% 3 ML: 3 NEBU SOLN at 19:50

## 2024-01-01 RX ADMIN — DEXMEDETOMIDINE HYDROCHLORIDE 0.2 MCG/KG/HR: 4 INJECTION, SOLUTION INTRAVENOUS at 22:02

## 2024-01-01 RX ADMIN — TRAZODONE HYDROCHLORIDE 50 MG: 50 TABLET ORAL at 20:44

## 2024-01-01 RX ADMIN — Medication 40 MG: at 12:08

## 2024-01-01 RX ADMIN — VANCOMYCIN HYDROCHLORIDE 1000 MG: 1 INJECTION, SOLUTION INTRAVENOUS at 19:44

## 2024-01-01 RX ADMIN — CALCIUM CHLORIDE, MAGNESIUM CHLORIDE, SODIUM CHLORIDE, SODIUM BICARBONATE, POTASSIUM CHLORIDE AND SODIUM PHOSPHATE DIBASIC DIHYDRATE 12.5 ML/KG/HR: 3.68; 3.05; 6.34; 3.09; .314; .187 INJECTION INTRAVENOUS at 00:44

## 2024-01-01 RX ADMIN — POSACONAZOLE 300 MG: 100 TABLET, DELAYED RELEASE ORAL at 14:16

## 2024-01-01 RX ADMIN — LINEZOLID 600 MG: 600 TABLET, FILM COATED ORAL at 11:38

## 2024-01-01 RX ADMIN — CALCIUM CHLORIDE, MAGNESIUM CHLORIDE, SODIUM CHLORIDE, SODIUM BICARBONATE, POTASSIUM CHLORIDE AND SODIUM PHOSPHATE DIBASIC DIHYDRATE 12.5 ML/KG/HR: 3.68; 3.05; 6.34; 3.09; .314; .187 INJECTION INTRAVENOUS at 21:06

## 2024-01-01 RX ADMIN — PROCHLORPERAZINE MALEATE 10 MG: 5 TABLET ORAL at 19:54

## 2024-01-01 RX ADMIN — CARVEDILOL 6.25 MG: 6.25 TABLET, FILM COATED ORAL at 08:28

## 2024-01-01 RX ADMIN — LEVALBUTEROL HYDROCHLORIDE 0.63 MG: 0.63 SOLUTION RESPIRATORY (INHALATION) at 11:20

## 2024-01-01 RX ADMIN — Medication 50 MCG: at 08:51

## 2024-01-01 RX ADMIN — METOPROLOL TARTRATE 25 MG: 25 TABLET, FILM COATED ORAL at 20:52

## 2024-01-01 RX ADMIN — Medication 1 PACKET: at 20:42

## 2024-01-01 RX ADMIN — VALGANCICLOVIR HYDROCHLORIDE 450 MG: 50 POWDER, FOR SOLUTION ORAL at 14:17

## 2024-01-01 RX ADMIN — QUETIAPINE FUMARATE 50 MG: 50 TABLET ORAL at 22:21

## 2024-01-01 RX ADMIN — Medication 50 MCG: at 09:09

## 2024-01-01 RX ADMIN — PREDNISONE 40 MG: 20 TABLET ORAL at 11:09

## 2024-01-01 RX ADMIN — QUETIAPINE FUMARATE 25 MG: 25 TABLET ORAL at 21:34

## 2024-01-01 RX ADMIN — SODIUM CHLORIDE 250 ML: 9 INJECTION, SOLUTION INTRAVENOUS at 19:00

## 2024-01-01 RX ADMIN — QUETIAPINE FUMARATE 25 MG: 25 TABLET ORAL at 04:24

## 2024-01-01 RX ADMIN — LEVALBUTEROL HYDROCHLORIDE 0.63 MG: 0.63 SOLUTION RESPIRATORY (INHALATION) at 19:50

## 2024-01-01 RX ADMIN — EPOETIN ALFA-EPBX 4000 UNITS: 10000 INJECTION, SOLUTION INTRAVENOUS; SUBCUTANEOUS at 10:48

## 2024-01-01 RX ADMIN — POSACONAZOLE 300 MG: 100 TABLET, DELAYED RELEASE ORAL at 14:05

## 2024-01-01 RX ADMIN — CHLORHEXIDINE GLUCONATE 0.12% ORAL RINSE 15 ML: 1.2 LIQUID ORAL at 08:33

## 2024-01-01 RX ADMIN — HEPARIN SODIUM 5000 UNITS: 5000 INJECTION, SOLUTION INTRAVENOUS; SUBCUTANEOUS at 06:17

## 2024-01-01 RX ADMIN — MIDAZOLAM 1 MG: 1 INJECTION INTRAMUSCULAR; INTRAVENOUS at 13:08

## 2024-01-01 RX ADMIN — RAMELTEON 8 MG: 8 TABLET ORAL at 21:39

## 2024-01-01 RX ADMIN — INSULIN ASPART 2 UNITS: 100 INJECTION, SOLUTION INTRAVENOUS; SUBCUTANEOUS at 16:20

## 2024-01-01 RX ADMIN — HYDRALAZINE HYDROCHLORIDE 10 MG: 20 INJECTION INTRAMUSCULAR; INTRAVENOUS at 15:26

## 2024-01-01 RX ADMIN — MONTELUKAST 10 MG: 10 TABLET, FILM COATED ORAL at 20:51

## 2024-01-01 RX ADMIN — QUETIAPINE FUMARATE 50 MG: 50 TABLET ORAL at 21:12

## 2024-01-01 RX ADMIN — Medication 60 ML: at 08:50

## 2024-01-01 RX ADMIN — ACETAMINOPHEN 975 MG: 325 TABLET, FILM COATED ORAL at 05:12

## 2024-01-01 RX ADMIN — CALCIUM CHLORIDE, MAGNESIUM CHLORIDE, SODIUM CHLORIDE, SODIUM BICARBONATE, POTASSIUM CHLORIDE AND SODIUM PHOSPHATE DIBASIC DIHYDRATE 12.5 ML/KG/HR: 3.68; 3.05; 6.34; 3.09; .314; .187 INJECTION INTRAVENOUS at 11:31

## 2024-01-01 RX ADMIN — SODIUM CHLORIDE SOLN NEBU 3% 3 ML: 3 NEBU SOLN at 20:14

## 2024-01-01 RX ADMIN — AZITHROMYCIN DIHYDRATE 250 MG: 250 TABLET ORAL at 08:25

## 2024-01-01 RX ADMIN — CALCIUM CHLORIDE, MAGNESIUM CHLORIDE, SODIUM CHLORIDE, SODIUM BICARBONATE, POTASSIUM CHLORIDE AND SODIUM PHOSPHATE DIBASIC DIHYDRATE 12.5 ML/KG/HR: 3.68; 3.05; 6.34; 3.09; .314; .187 INJECTION INTRAVENOUS at 17:57

## 2024-01-01 RX ADMIN — MONTELUKAST 10 MG: 10 TABLET, FILM COATED ORAL at 21:49

## 2024-01-01 RX ADMIN — ACETYLCYSTEINE 2 ML: 200 SOLUTION ORAL; RESPIRATORY (INHALATION) at 15:44

## 2024-01-01 RX ADMIN — POSACONAZOLE 300 MG: 100 TABLET, DELAYED RELEASE ORAL at 12:41

## 2024-01-01 RX ADMIN — EPOETIN ALFA-EPBX 7000 UNITS: 10000 INJECTION, SOLUTION INTRAVENOUS; SUBCUTANEOUS at 12:25

## 2024-01-01 RX ADMIN — DEXTROSE MONOHYDRATE 25 ML: 25 INJECTION, SOLUTION INTRAVENOUS at 01:39

## 2024-01-01 RX ADMIN — Medication 5 ML: at 07:40

## 2024-01-01 RX ADMIN — Medication 3 MG: at 20:03

## 2024-01-01 RX ADMIN — METOPROLOL TARTRATE 25 MG: 25 TABLET, FILM COATED ORAL at 08:01

## 2024-01-01 RX ADMIN — LORAZEPAM 1 MG: 2 LIQUID ORAL at 06:31

## 2024-01-01 RX ADMIN — ACETAMINOPHEN 325 MG: 325 TABLET, FILM COATED ORAL at 01:09

## 2024-01-01 RX ADMIN — TACROLIMUS 1.5 MG: 5 CAPSULE ORAL at 18:10

## 2024-01-01 RX ADMIN — DEXMEDETOMIDINE HYDROCHLORIDE 1.2 MCG/KG/HR: 4 INJECTION, SOLUTION INTRAVENOUS at 05:47

## 2024-01-01 RX ADMIN — LORAZEPAM 0.5 MG: 2 INJECTION INTRAMUSCULAR at 16:04

## 2024-01-01 RX ADMIN — INSULIN ASPART 2 UNITS: 100 INJECTION, SOLUTION INTRAVENOUS; SUBCUTANEOUS at 00:16

## 2024-01-01 RX ADMIN — INSULIN ASPART 1 UNITS: 100 INJECTION, SOLUTION INTRAVENOUS; SUBCUTANEOUS at 12:47

## 2024-01-01 RX ADMIN — Medication 50 MCG: at 07:47

## 2024-01-01 RX ADMIN — ACETAMINOPHEN 325 MG: 325 TABLET, FILM COATED ORAL at 10:09

## 2024-01-01 RX ADMIN — Medication 40 MG: at 20:13

## 2024-01-01 RX ADMIN — HEPARIN SODIUM 5000 UNITS: 5000 INJECTION, SOLUTION INTRAVENOUS; SUBCUTANEOUS at 14:21

## 2024-01-01 RX ADMIN — CALCIUM GLUCONATE 2 G: 20 INJECTION, SOLUTION INTRAVENOUS at 16:13

## 2024-01-01 RX ADMIN — TRAZODONE HYDROCHLORIDE 25 MG: 50 TABLET ORAL at 20:58

## 2024-01-01 RX ADMIN — HYDROCORTISONE SODIUM SUCCINATE 25 MG: 100 INJECTION, POWDER, FOR SOLUTION INTRAMUSCULAR; INTRAVENOUS at 08:10

## 2024-01-01 RX ADMIN — CALCIUM CHLORIDE, MAGNESIUM CHLORIDE, SODIUM CHLORIDE, SODIUM BICARBONATE, POTASSIUM CHLORIDE AND SODIUM PHOSPHATE DIBASIC DIHYDRATE 12.5 ML/KG/HR: 3.68; 3.05; 6.34; 3.09; .314; .187 INJECTION INTRAVENOUS at 10:26

## 2024-01-01 RX ADMIN — PIPERACILLIN AND TAZOBACTAM 4.5 G: 4; .5 INJECTION, POWDER, FOR SOLUTION INTRAVENOUS at 15:45

## 2024-01-01 RX ADMIN — HEPARIN SODIUM 500 UNITS/HR: 1000 INJECTION INTRAVENOUS; SUBCUTANEOUS at 15:02

## 2024-01-01 RX ADMIN — SULFAMETHOXAZOLE AND TRIMETHOPRIM 80 MG: 200; 40 SUSPENSION ORAL at 21:52

## 2024-01-01 RX ADMIN — CALCIUM CHLORIDE, MAGNESIUM CHLORIDE, SODIUM CHLORIDE, SODIUM BICARBONATE, POTASSIUM CHLORIDE AND SODIUM PHOSPHATE DIBASIC DIHYDRATE 12.5 ML/KG/HR: 3.68; 3.05; 6.34; 3.09; .314; .187 INJECTION INTRAVENOUS at 19:51

## 2024-01-01 RX ADMIN — ACETAMINOPHEN 325 MG: 325 TABLET, FILM COATED ORAL at 19:53

## 2024-01-01 RX ADMIN — Medication 40 MG: at 21:23

## 2024-01-01 RX ADMIN — RAMELTEON 8 MG: 8 TABLET ORAL at 20:37

## 2024-01-01 RX ADMIN — INSULIN HUMAN 8 UNITS: 100 INJECTION, SUSPENSION SUBCUTANEOUS at 21:11

## 2024-01-01 RX ADMIN — CHLORHEXIDINE GLUCONATE 0.12% ORAL RINSE 15 ML: 1.2 LIQUID ORAL at 21:35

## 2024-01-01 RX ADMIN — METOPROLOL TARTRATE 25 MG: 25 TABLET, FILM COATED ORAL at 20:29

## 2024-01-01 RX ADMIN — Medication 40 MG: at 12:22

## 2024-01-01 RX ADMIN — HYDROCORTISONE SODIUM SUCCINATE 50 MG: 100 INJECTION, POWDER, FOR SOLUTION INTRAMUSCULAR; INTRAVENOUS at 22:10

## 2024-01-01 RX ADMIN — TACROLIMUS 1.5 MG: 5 CAPSULE ORAL at 17:47

## 2024-01-01 RX ADMIN — RAMELTEON 8 MG: 8 TABLET, FILM COATED ORAL at 21:43

## 2024-01-01 RX ADMIN — LEVALBUTEROL HYDROCHLORIDE 0.63 MG: 0.63 SOLUTION RESPIRATORY (INHALATION) at 16:06

## 2024-01-01 RX ADMIN — ONDANSETRON 4 MG: 2 INJECTION INTRAMUSCULAR; INTRAVENOUS at 10:13

## 2024-01-01 RX ADMIN — HEPARIN SODIUM 5000 UNITS: 5000 INJECTION, SOLUTION INTRAVENOUS; SUBCUTANEOUS at 14:52

## 2024-01-01 RX ADMIN — HYDROMORPHONE HYDROCHLORIDE 0.5 MG: 1 INJECTION, SOLUTION INTRAMUSCULAR; INTRAVENOUS; SUBCUTANEOUS at 23:04

## 2024-01-01 RX ADMIN — ALBUTEROL SULFATE 2.5 MG: 2.5 SOLUTION RESPIRATORY (INHALATION) at 21:36

## 2024-01-01 RX ADMIN — LIDOCAINE HYDROCHLORIDE 0.5 ML: 10 INJECTION, SOLUTION EPIDURAL; INFILTRATION; INTRACAUDAL; PERINEURAL at 15:03

## 2024-01-01 RX ADMIN — MIDODRINE HYDROCHLORIDE 10 MG: 5 TABLET ORAL at 22:25

## 2024-01-01 RX ADMIN — Medication 40 MG: at 12:15

## 2024-01-01 RX ADMIN — MICAFUNGIN SODIUM 150 MG: 50 INJECTION, POWDER, LYOPHILIZED, FOR SOLUTION INTRAVENOUS at 23:02

## 2024-01-01 RX ADMIN — ACETYLCYSTEINE 4 ML: 100 SOLUTION ORAL; RESPIRATORY (INHALATION) at 08:33

## 2024-01-01 RX ADMIN — ACETYLCYSTEINE 4 ML: 100 SOLUTION ORAL; RESPIRATORY (INHALATION) at 12:35

## 2024-01-01 RX ADMIN — CALCIUM CHLORIDE, MAGNESIUM CHLORIDE, SODIUM CHLORIDE, SODIUM BICARBONATE, POTASSIUM CHLORIDE AND SODIUM PHOSPHATE DIBASIC DIHYDRATE 12.5 ML/KG/HR: 3.68; 3.05; 6.34; 3.09; .314; .187 INJECTION INTRAVENOUS at 01:05

## 2024-01-01 RX ADMIN — Medication 1 PACKET: at 07:39

## 2024-01-01 RX ADMIN — CHLORHEXIDINE GLUCONATE 0.12% ORAL RINSE 15 ML: 1.2 LIQUID ORAL at 20:08

## 2024-01-01 RX ADMIN — QUETIAPINE FUMARATE 50 MG: 50 TABLET ORAL at 21:00

## 2024-01-01 RX ADMIN — QUETIAPINE FUMARATE 50 MG: 50 TABLET ORAL at 21:28

## 2024-01-01 RX ADMIN — Medication 1 TABLET: at 08:38

## 2024-01-01 RX ADMIN — ACETAMINOPHEN 975 MG: 325 TABLET, FILM COATED ORAL at 14:10

## 2024-01-01 RX ADMIN — Medication 25 MG: at 21:42

## 2024-01-01 RX ADMIN — POLYETHYLENE GLYCOL 3350 17 G: 17 POWDER, FOR SOLUTION ORAL at 11:58

## 2024-01-01 RX ADMIN — Medication 40 MG: at 21:42

## 2024-01-01 RX ADMIN — MICAFUNGIN SODIUM 150 MG: 50 INJECTION, POWDER, LYOPHILIZED, FOR SOLUTION INTRAVENOUS at 23:05

## 2024-01-01 RX ADMIN — METOPROLOL TARTRATE 25 MG: 25 TABLET, FILM COATED ORAL at 08:23

## 2024-01-01 RX ADMIN — INSULIN HUMAN 8 UNITS: 100 INJECTION, SUSPENSION SUBCUTANEOUS at 20:17

## 2024-01-01 RX ADMIN — QUETIAPINE FUMARATE 150 MG: 50 TABLET ORAL at 21:34

## 2024-01-01 RX ADMIN — LINEZOLID 600 MG: 600 TABLET, FILM COATED ORAL at 00:18

## 2024-01-01 RX ADMIN — EPOETIN ALFA-EPBX 4000 UNITS: 10000 INJECTION, SOLUTION INTRAVENOUS; SUBCUTANEOUS at 08:45

## 2024-01-01 RX ADMIN — LEVALBUTEROL HYDROCHLORIDE 0.63 MG: 0.63 SOLUTION RESPIRATORY (INHALATION) at 08:37

## 2024-01-01 RX ADMIN — LEVALBUTEROL HYDROCHLORIDE 0.63 MG: 0.63 SOLUTION RESPIRATORY (INHALATION) at 15:48

## 2024-01-01 RX ADMIN — MIDODRINE HYDROCHLORIDE 10 MG: 5 TABLET ORAL at 06:15

## 2024-01-01 RX ADMIN — Medication 1 PACKET: at 08:49

## 2024-01-01 RX ADMIN — CHLORHEXIDINE GLUCONATE 0.12% ORAL RINSE 15 ML: 1.2 LIQUID ORAL at 12:28

## 2024-01-01 RX ADMIN — LORAZEPAM 1 MG: 2 INJECTION INTRAMUSCULAR; INTRAVENOUS at 05:39

## 2024-01-01 RX ADMIN — ESCITALOPRAM 10 MG: 5 SOLUTION ORAL at 08:58

## 2024-01-01 RX ADMIN — Medication 125 MG: at 09:10

## 2024-01-01 RX ADMIN — Medication 60 ML: at 09:10

## 2024-01-01 RX ADMIN — INSULIN HUMAN 8 UNITS: 100 INJECTION, SUSPENSION SUBCUTANEOUS at 21:10

## 2024-01-01 RX ADMIN — Medication 50 MCG: at 16:51

## 2024-01-01 RX ADMIN — HYDROCORTISONE SODIUM SUCCINATE 25 MG: 100 INJECTION, POWDER, FOR SOLUTION INTRAMUSCULAR; INTRAVENOUS at 19:49

## 2024-01-01 RX ADMIN — CALCIUM CARBONATE (ANTACID) CHEW TAB 500 MG 500 MG: 500 CHEW TAB at 08:12

## 2024-01-01 RX ADMIN — MINOCYCLINE HYDROCHLORIDE 100 MG: 100 INJECTION INTRAVENOUS at 15:11

## 2024-01-01 RX ADMIN — LEVALBUTEROL HYDROCHLORIDE 0.63 MG: 0.63 SOLUTION RESPIRATORY (INHALATION) at 15:47

## 2024-01-01 RX ADMIN — PREDNISONE 40 MG: 20 TABLET ORAL at 08:28

## 2024-01-01 RX ADMIN — ACETAMINOPHEN 975 MG: 325 TABLET, FILM COATED ORAL at 06:53

## 2024-01-01 RX ADMIN — LEVALBUTEROL HYDROCHLORIDE 0.63 MG: 0.63 SOLUTION RESPIRATORY (INHALATION) at 08:50

## 2024-01-01 RX ADMIN — Medication 1 PACKET: at 08:09

## 2024-01-01 RX ADMIN — Medication 1.8 UNITS/HR: at 12:05

## 2024-01-01 RX ADMIN — LORAZEPAM 1 MG: 2 INJECTION INTRAMUSCULAR; INTRAVENOUS at 12:01

## 2024-01-01 RX ADMIN — HEPARIN SODIUM 5000 UNITS: 5000 INJECTION, SOLUTION INTRAVENOUS; SUBCUTANEOUS at 13:59

## 2024-01-01 RX ADMIN — HEPARIN SODIUM 5000 UNITS: 5000 INJECTION, SOLUTION INTRAVENOUS; SUBCUTANEOUS at 23:08

## 2024-01-01 RX ADMIN — QUETIAPINE FUMARATE 50 MG: 50 TABLET ORAL at 21:39

## 2024-01-01 RX ADMIN — Medication 40 MG: at 21:06

## 2024-01-01 RX ADMIN — MINOCYCLINE HYDROCHLORIDE 100 MG: 100 INJECTION INTRAVENOUS at 11:51

## 2024-01-01 RX ADMIN — SODIUM CHLORIDE SOLN NEBU 3% 3 ML: 3 NEBU SOLN at 19:53

## 2024-01-01 RX ADMIN — ACETAMINOPHEN 325 MG: 325 TABLET, FILM COATED ORAL at 01:01

## 2024-01-01 RX ADMIN — SODIUM CHLORIDE SOLN NEBU 3% 3 ML: 3 NEBU SOLN at 11:36

## 2024-01-01 RX ADMIN — HEPARIN SODIUM 5000 UNITS: 5000 INJECTION, SOLUTION INTRAVENOUS; SUBCUTANEOUS at 04:08

## 2024-01-01 RX ADMIN — EPOETIN ALFA-EPBX 4000 UNITS: 10000 INJECTION, SOLUTION INTRAVENOUS; SUBCUTANEOUS at 10:17

## 2024-01-01 RX ADMIN — INSULIN ASPART 3 UNITS: 100 INJECTION, SOLUTION INTRAVENOUS; SUBCUTANEOUS at 12:50

## 2024-01-01 RX ADMIN — LEVALBUTEROL HYDROCHLORIDE 0.63 MG: 0.63 SOLUTION RESPIRATORY (INHALATION) at 16:50

## 2024-01-01 RX ADMIN — Medication 60 ML: at 08:29

## 2024-01-01 RX ADMIN — LEVALBUTEROL HYDROCHLORIDE 0.63 MG: 0.63 SOLUTION RESPIRATORY (INHALATION) at 08:10

## 2024-01-01 RX ADMIN — ESCITALOPRAM 10 MG: 5 SOLUTION ORAL at 08:28

## 2024-01-01 RX ADMIN — AZITHROMYCIN DIHYDRATE 250 MG: 250 TABLET ORAL at 08:01

## 2024-01-01 RX ADMIN — TRAZODONE HYDROCHLORIDE 50 MG: 50 TABLET ORAL at 21:16

## 2024-01-01 RX ADMIN — MIDODRINE HYDROCHLORIDE 10 MG: 5 TABLET ORAL at 06:54

## 2024-01-01 RX ADMIN — METOPROLOL TARTRATE 25 MG: 25 TABLET, FILM COATED ORAL at 08:12

## 2024-01-01 RX ADMIN — Medication 2 MCG/KG/MIN: at 22:43

## 2024-01-01 RX ADMIN — MIDODRINE HYDROCHLORIDE 10 MG: 5 TABLET ORAL at 22:01

## 2024-01-01 RX ADMIN — AZITHROMYCIN DIHYDRATE 250 MG: 250 TABLET ORAL at 07:38

## 2024-01-01 RX ADMIN — Medication 50 MCG: at 07:59

## 2024-01-01 RX ADMIN — VALGANCICLOVIR HYDROCHLORIDE 450 MG: 50 POWDER, FOR SOLUTION ORAL at 08:07

## 2024-01-01 RX ADMIN — HYDROMORPHONE HYDROCHLORIDE 0.5 MG: 1 INJECTION, SOLUTION INTRAMUSCULAR; INTRAVENOUS; SUBCUTANEOUS at 05:59

## 2024-01-01 RX ADMIN — Medication 25 MG: at 21:57

## 2024-01-01 RX ADMIN — CALCIUM CHLORIDE, MAGNESIUM CHLORIDE, SODIUM CHLORIDE, SODIUM BICARBONATE, POTASSIUM CHLORIDE AND SODIUM PHOSPHATE DIBASIC DIHYDRATE 12.5 ML/KG/HR: 3.68; 3.05; 6.34; 3.09; .314; .187 INJECTION INTRAVENOUS at 05:22

## 2024-01-01 RX ADMIN — HEPARIN SODIUM 5000 UNITS: 5000 INJECTION, SOLUTION INTRAVENOUS; SUBCUTANEOUS at 06:04

## 2024-01-01 RX ADMIN — TACROLIMUS 1.5 MG: 5 CAPSULE ORAL at 18:45

## 2024-01-01 RX ADMIN — SODIUM CHLORIDE SOLN NEBU 3% 3 ML: 3 NEBU SOLN at 12:48

## 2024-01-01 RX ADMIN — LEVALBUTEROL HYDROCHLORIDE 0.63 MG: 0.63 SOLUTION RESPIRATORY (INHALATION) at 08:49

## 2024-01-01 RX ADMIN — LIDOCAINE HYDROCHLORIDE 2 ML: 20 JELLY TOPICAL at 09:45

## 2024-01-01 RX ADMIN — HYDROCORTISONE SODIUM SUCCINATE 50 MG: 100 INJECTION, POWDER, FOR SOLUTION INTRAMUSCULAR; INTRAVENOUS at 04:27

## 2024-01-01 RX ADMIN — Medication 1 TABLET: at 09:10

## 2024-01-01 RX ADMIN — AZITHROMYCIN DIHYDRATE 250 MG: 250 TABLET ORAL at 07:48

## 2024-01-01 RX ADMIN — POSACONAZOLE 300 MG: 100 TABLET, DELAYED RELEASE ORAL at 11:38

## 2024-01-01 RX ADMIN — MINOCYCLINE HYDROCHLORIDE 100 MG: 100 INJECTION INTRAVENOUS at 00:10

## 2024-01-01 RX ADMIN — INSULIN ASPART 1 UNITS: 100 INJECTION, SOLUTION INTRAVENOUS; SUBCUTANEOUS at 08:58

## 2024-01-01 RX ADMIN — LEVALBUTEROL HYDROCHLORIDE 0.63 MG: 0.63 SOLUTION RESPIRATORY (INHALATION) at 19:59

## 2024-01-01 RX ADMIN — CHLORHEXIDINE GLUCONATE 0.12% ORAL RINSE 15 ML: 1.2 LIQUID ORAL at 20:45

## 2024-01-01 RX ADMIN — HYDROCORTISONE SODIUM SUCCINATE 50 MG: 100 INJECTION, POWDER, FOR SOLUTION INTRAMUSCULAR; INTRAVENOUS at 11:51

## 2024-01-01 RX ADMIN — PREDNISONE 50 MG: 50 TABLET ORAL at 08:45

## 2024-01-01 RX ADMIN — MIDODRINE HYDROCHLORIDE 15 MG: 5 TABLET ORAL at 08:11

## 2024-01-01 RX ADMIN — HEPARIN SODIUM 5000 UNITS: 5000 INJECTION, SOLUTION INTRAVENOUS; SUBCUTANEOUS at 21:09

## 2024-01-01 RX ADMIN — LEVALBUTEROL HYDROCHLORIDE 0.63 MG: 0.63 SOLUTION RESPIRATORY (INHALATION) at 12:31

## 2024-01-01 RX ADMIN — QUETIAPINE FUMARATE 25 MG: 25 TABLET ORAL at 09:10

## 2024-01-01 RX ADMIN — SULFAMETHOXAZOLE AND TRIMETHOPRIM 80 MG: 200; 40 SUSPENSION ORAL at 20:35

## 2024-01-01 RX ADMIN — LEVALBUTEROL HYDROCHLORIDE 0.63 MG: 0.63 SOLUTION RESPIRATORY (INHALATION) at 16:41

## 2024-01-01 RX ADMIN — ALBUTEROL SULFATE 2.5 MG: 2.5 SOLUTION RESPIRATORY (INHALATION) at 08:51

## 2024-01-01 RX ADMIN — DEXMEDETOMIDINE HYDROCHLORIDE 1 MCG/KG/HR: 4 INJECTION, SOLUTION INTRAVENOUS at 19:56

## 2024-01-01 RX ADMIN — INSULIN ASPART 1 UNITS: 100 INJECTION, SOLUTION INTRAVENOUS; SUBCUTANEOUS at 04:19

## 2024-01-01 RX ADMIN — LEVALBUTEROL HYDROCHLORIDE 0.63 MG: 0.63 SOLUTION RESPIRATORY (INHALATION) at 07:24

## 2024-01-01 RX ADMIN — LEVALBUTEROL HYDROCHLORIDE 0.63 MG: 0.63 SOLUTION RESPIRATORY (INHALATION) at 12:11

## 2024-01-01 RX ADMIN — ONDANSETRON HYDROCHLORIDE 4 MG: 4 TABLET, FILM COATED ORAL at 16:15

## 2024-01-01 RX ADMIN — POSACONAZOLE 300 MG: 100 TABLET, DELAYED RELEASE ORAL at 14:51

## 2024-01-01 RX ADMIN — CHLORHEXIDINE GLUCONATE 0.12% ORAL RINSE 15 ML: 1.2 LIQUID ORAL at 08:49

## 2024-01-01 RX ADMIN — QUETIAPINE FUMARATE 25 MG: 25 TABLET ORAL at 12:59

## 2024-01-01 RX ADMIN — Medication 40 MG: at 20:25

## 2024-01-01 RX ADMIN — OXYCODONE HYDROCHLORIDE 5 MG: 100 SOLUTION ORAL at 12:48

## 2024-01-01 RX ADMIN — ACETAMINOPHEN 650 MG: 325 TABLET, FILM COATED ORAL at 03:15

## 2024-01-01 RX ADMIN — LEVALBUTEROL HYDROCHLORIDE 0.63 MG: 0.63 SOLUTION RESPIRATORY (INHALATION) at 21:34

## 2024-01-01 RX ADMIN — Medication 25 MCG: at 22:39

## 2024-01-01 RX ADMIN — LIDOCAINE HYDROCHLORIDE 5 ML: 10 INJECTION, SOLUTION EPIDURAL; INFILTRATION; INTRACAUDAL; PERINEURAL at 12:28

## 2024-01-01 RX ADMIN — SODIUM BICARBONATE 325 MG: 650 TABLET ORAL at 13:26

## 2024-01-01 RX ADMIN — ACETYLCYSTEINE 4 ML: 100 SOLUTION ORAL; RESPIRATORY (INHALATION) at 22:17

## 2024-01-01 RX ADMIN — PREDNISONE 5 MG: 5 TABLET ORAL at 07:54

## 2024-01-01 RX ADMIN — GADOBUTROL 5.5 ML: 604.72 INJECTION INTRAVENOUS at 13:21

## 2024-01-01 RX ADMIN — Medication 4 UNITS/HR: at 03:04

## 2024-01-01 RX ADMIN — SODIUM CHLORIDE, POTASSIUM CHLORIDE, SODIUM LACTATE AND CALCIUM CHLORIDE: 600; 310; 30; 20 INJECTION, SOLUTION INTRAVENOUS at 13:59

## 2024-01-01 RX ADMIN — CHLORHEXIDINE GLUCONATE 0.12% ORAL RINSE 15 ML: 1.2 LIQUID ORAL at 20:40

## 2024-01-01 RX ADMIN — HYDROCORTISONE SODIUM SUCCINATE 50 MG: 100 INJECTION, POWDER, FOR SOLUTION INTRAMUSCULAR; INTRAVENOUS at 08:01

## 2024-01-01 RX ADMIN — METOPROLOL TARTRATE 5 MG: 5 INJECTION INTRAVENOUS at 02:36

## 2024-01-01 RX ADMIN — FLUDROCORTISONE ACETATE 0.1 MG: 0.1 TABLET ORAL at 08:26

## 2024-01-01 RX ADMIN — OXYCODONE HYDROCHLORIDE 5 MG: 100 SOLUTION ORAL at 16:14

## 2024-01-01 RX ADMIN — ACETAMINOPHEN 325 MG: 325 TABLET, FILM COATED ORAL at 12:40

## 2024-01-01 RX ADMIN — LEVALBUTEROL HYDROCHLORIDE 0.63 MG: 0.63 SOLUTION RESPIRATORY (INHALATION) at 12:48

## 2024-01-01 RX ADMIN — ACETYLCYSTEINE 4 ML: 100 SOLUTION ORAL; RESPIRATORY (INHALATION) at 07:48

## 2024-01-01 RX ADMIN — LEVALBUTEROL HYDROCHLORIDE 0.63 MG: 0.63 SOLUTION RESPIRATORY (INHALATION) at 12:25

## 2024-01-01 RX ADMIN — MIDODRINE HYDROCHLORIDE 20 MG: 5 TABLET ORAL at 06:06

## 2024-01-01 RX ADMIN — HEPARIN SODIUM 5000 UNITS: 5000 INJECTION, SOLUTION INTRAVENOUS; SUBCUTANEOUS at 21:57

## 2024-01-01 RX ADMIN — Medication 1 PACKET: at 23:09

## 2024-01-01 RX ADMIN — Medication 40 MG: at 20:59

## 2024-01-01 RX ADMIN — AZITHROMYCIN 125 MG: 200 POWDER, FOR SUSPENSION PARENTERAL at 08:49

## 2024-01-01 RX ADMIN — Medication 50 MCG: at 08:02

## 2024-01-01 RX ADMIN — LEVALBUTEROL HYDROCHLORIDE 0.63 MG: 0.63 SOLUTION RESPIRATORY (INHALATION) at 21:59

## 2024-01-01 RX ADMIN — Medication 10 MG: at 22:39

## 2024-01-01 RX ADMIN — HYDROMORPHONE HYDROCHLORIDE 0.5 MG: 1 INJECTION, SOLUTION INTRAMUSCULAR; INTRAVENOUS; SUBCUTANEOUS at 19:54

## 2024-01-01 RX ADMIN — Medication 0.5 UNITS: at 12:07

## 2024-01-01 RX ADMIN — QUETIAPINE FUMARATE 25 MG: 25 TABLET ORAL at 03:14

## 2024-01-01 RX ADMIN — LINEZOLID 600 MG: 600 TABLET, FILM COATED ORAL at 00:10

## 2024-01-01 RX ADMIN — METOPROLOL TARTRATE 25 MG: 25 TABLET, FILM COATED ORAL at 20:12

## 2024-01-01 RX ADMIN — ACETYLCYSTEINE 4 ML: 100 SOLUTION ORAL; RESPIRATORY (INHALATION) at 15:43

## 2024-01-01 RX ADMIN — ALBUTEROL SULFATE 2.5 MG: 2.5 SOLUTION RESPIRATORY (INHALATION) at 12:36

## 2024-01-01 RX ADMIN — INSULIN HUMAN 8 UNITS: 100 INJECTION, SUSPENSION SUBCUTANEOUS at 20:28

## 2024-01-01 RX ADMIN — FENTANYL CITRATE 50 MCG: 50 INJECTION INTRAMUSCULAR; INTRAVENOUS at 17:40

## 2024-01-01 RX ADMIN — DEXMEDETOMIDINE HYDROCHLORIDE 0.8 MCG/KG/HR: 4 INJECTION, SOLUTION INTRAVENOUS at 00:42

## 2024-01-01 RX ADMIN — AZITHROMYCIN 125 MG: 200 POWDER, FOR SUSPENSION PARENTERAL at 08:44

## 2024-01-01 RX ADMIN — INSULIN ASPART 2 UNITS: 100 INJECTION, SOLUTION INTRAVENOUS; SUBCUTANEOUS at 16:36

## 2024-01-01 RX ADMIN — PREDNISONE 50 MG: 50 TABLET ORAL at 11:46

## 2024-01-01 RX ADMIN — ACETYLCYSTEINE 4 ML: 100 SOLUTION ORAL; RESPIRATORY (INHALATION) at 07:29

## 2024-01-01 RX ADMIN — LORAZEPAM 1 MG: 2 LIQUID ORAL at 04:22

## 2024-01-01 RX ADMIN — CALCIUM CHLORIDE, MAGNESIUM CHLORIDE, SODIUM CHLORIDE, SODIUM BICARBONATE, POTASSIUM CHLORIDE AND SODIUM PHOSPHATE DIBASIC DIHYDRATE 12.5 ML/KG/HR: 3.68; 3.05; 6.34; 3.09; .314; .187 INJECTION INTRAVENOUS at 16:29

## 2024-01-01 RX ADMIN — LORAZEPAM 0.5 MG: 2 LIQUID ORAL at 05:54

## 2024-01-01 RX ADMIN — OXYCODONE HYDROCHLORIDE 5 MG: 100 SOLUTION ORAL at 05:36

## 2024-01-01 RX ADMIN — CARVEDILOL 6.25 MG: 6.25 TABLET, FILM COATED ORAL at 20:20

## 2024-01-01 RX ADMIN — METOPROLOL TARTRATE 25 MG: 25 TABLET, FILM COATED ORAL at 20:01

## 2024-01-01 RX ADMIN — INSULIN ASPART 1 UNITS: 100 INJECTION, SOLUTION INTRAVENOUS; SUBCUTANEOUS at 20:58

## 2024-01-01 RX ADMIN — ANORECTAL OINTMENT: 15.7; .44; 24; 20.6 OINTMENT TOPICAL at 12:22

## 2024-01-01 RX ADMIN — HEPARIN SODIUM 5000 UNITS: 5000 INJECTION, SOLUTION INTRAVENOUS; SUBCUTANEOUS at 06:08

## 2024-01-01 RX ADMIN — DEXTROSE MONOHYDRATE 50 ML: 25 INJECTION, SOLUTION INTRAVENOUS at 04:46

## 2024-01-01 RX ADMIN — Medication 5 ML: at 08:34

## 2024-01-01 RX ADMIN — SODIUM CHLORIDE SOLN NEBU 3% 3 ML: 3 NEBU SOLN at 12:06

## 2024-01-01 RX ADMIN — CALCIUM CHLORIDE, MAGNESIUM CHLORIDE, SODIUM CHLORIDE, SODIUM BICARBONATE, POTASSIUM CHLORIDE AND SODIUM PHOSPHATE DIBASIC DIHYDRATE 12.5 ML/KG/HR: 3.68; 3.05; 6.34; 3.09; .314; .187 INJECTION INTRAVENOUS at 09:11

## 2024-01-01 RX ADMIN — LORAZEPAM 1 MG: 2 LIQUID ORAL at 23:35

## 2024-01-01 RX ADMIN — ACETAMINOPHEN 325 MG: 325 TABLET, FILM COATED ORAL at 08:28

## 2024-01-01 RX ADMIN — Medication 10 MG: at 11:19

## 2024-01-01 RX ADMIN — INSULIN ASPART 3 UNITS: 100 INJECTION, SOLUTION INTRAVENOUS; SUBCUTANEOUS at 19:49

## 2024-01-01 RX ADMIN — TRAZODONE HYDROCHLORIDE 25 MG: 50 TABLET ORAL at 20:12

## 2024-01-01 RX ADMIN — Medication 40 MG: at 11:42

## 2024-01-01 RX ADMIN — ACETYLCYSTEINE 4 ML: 100 SOLUTION ORAL; RESPIRATORY (INHALATION) at 20:28

## 2024-01-01 RX ADMIN — ALBUTEROL SULFATE 2.5 MG: 2.5 SOLUTION RESPIRATORY (INHALATION) at 17:13

## 2024-01-01 RX ADMIN — CHLORHEXIDINE GLUCONATE 0.12% ORAL RINSE 15 ML: 1.2 LIQUID ORAL at 08:24

## 2024-01-01 RX ADMIN — HYDROMORPHONE HYDROCHLORIDE 0.5 MG: 1 INJECTION, SOLUTION INTRAMUSCULAR; INTRAVENOUS; SUBCUTANEOUS at 23:51

## 2024-01-01 RX ADMIN — INSULIN ASPART 2 UNITS: 100 INJECTION, SOLUTION INTRAVENOUS; SUBCUTANEOUS at 08:17

## 2024-01-01 RX ADMIN — LORAZEPAM 1 MG: 2 INJECTION INTRAMUSCULAR; INTRAVENOUS at 23:34

## 2024-01-01 RX ADMIN — FLUDROCORTISONE ACETATE 0.1 MG: 0.1 TABLET ORAL at 07:40

## 2024-01-01 RX ADMIN — LEVALBUTEROL HYDROCHLORIDE 0.63 MG: 0.63 SOLUTION RESPIRATORY (INHALATION) at 19:35

## 2024-01-01 RX ADMIN — HEPARIN SODIUM 5000 UNITS: 5000 INJECTION, SOLUTION INTRAVENOUS; SUBCUTANEOUS at 14:56

## 2024-01-01 RX ADMIN — MONTELUKAST 10 MG: 10 TABLET, FILM COATED ORAL at 20:56

## 2024-01-01 RX ADMIN — METOPROLOL TARTRATE 25 MG: 25 TABLET, FILM COATED ORAL at 08:45

## 2024-01-01 RX ADMIN — PANTOPRAZOLE SODIUM 40 MG: 40 INJECTION, POWDER, FOR SOLUTION INTRAVENOUS at 08:17

## 2024-01-01 RX ADMIN — HEPARIN SODIUM 5000 UNITS: 5000 INJECTION, SOLUTION INTRAVENOUS; SUBCUTANEOUS at 04:20

## 2024-01-01 RX ADMIN — Medication 0.5 MCG/KG/MIN: at 04:28

## 2024-01-01 RX ADMIN — PROPOFOL 40 MCG/KG/MIN: 10 INJECTION, EMULSION INTRAVENOUS at 05:09

## 2024-01-01 RX ADMIN — INSULIN ASPART 3 UNITS: 100 INJECTION, SOLUTION INTRAVENOUS; SUBCUTANEOUS at 16:51

## 2024-01-01 RX ADMIN — LIDOCAINE HYDROCHLORIDE 30 MG: 10 INJECTION, SOLUTION EPIDURAL; INFILTRATION; INTRACAUDAL; PERINEURAL at 11:19

## 2024-01-01 RX ADMIN — AZITHROMYCIN DIHYDRATE 250 MG: 250 TABLET ORAL at 08:14

## 2024-01-01 RX ADMIN — SODIUM CHLORIDE 300 ML: 9 INJECTION, SOLUTION INTRAVENOUS at 19:00

## 2024-01-01 RX ADMIN — HEPARIN SODIUM 5000 UNITS: 5000 INJECTION, SOLUTION INTRAVENOUS; SUBCUTANEOUS at 21:43

## 2024-01-01 RX ADMIN — LIDOCAINE HYDROCHLORIDE 0.5 ML: 10 INJECTION, SOLUTION EPIDURAL; INFILTRATION; INTRACAUDAL; PERINEURAL at 19:01

## 2024-01-01 RX ADMIN — HYDROMORPHONE HYDROCHLORIDE 0.5 MG: 1 INJECTION, SOLUTION INTRAMUSCULAR; INTRAVENOUS; SUBCUTANEOUS at 02:08

## 2024-01-01 RX ADMIN — TACROLIMUS 0.8 MG: 5 CAPSULE ORAL at 17:49

## 2024-01-01 RX ADMIN — SODIUM CHLORIDE, POTASSIUM CHLORIDE, SODIUM LACTATE AND CALCIUM CHLORIDE: 600; 310; 30; 20 INJECTION, SOLUTION INTRAVENOUS at 07:42

## 2024-01-01 RX ADMIN — MICAFUNGIN SODIUM 150 MG: 50 INJECTION, POWDER, LYOPHILIZED, FOR SOLUTION INTRAVENOUS at 23:33

## 2024-01-01 RX ADMIN — LEVALBUTEROL HYDROCHLORIDE 0.63 MG: 0.63 SOLUTION RESPIRATORY (INHALATION) at 11:53

## 2024-01-01 RX ADMIN — SODIUM CHLORIDE SOLN NEBU 3% 3 ML: 3 NEBU SOLN at 20:38

## 2024-01-01 RX ADMIN — TACROLIMUS 0.8 MG: 5 CAPSULE ORAL at 07:39

## 2024-01-01 RX ADMIN — LORAZEPAM 1 MG: 2 LIQUID ORAL at 18:26

## 2024-01-01 RX ADMIN — Medication 60 ML: at 08:19

## 2024-01-01 RX ADMIN — HEPARIN SODIUM 5000 UNITS: 5000 INJECTION, SOLUTION INTRAVENOUS; SUBCUTANEOUS at 06:15

## 2024-01-01 RX ADMIN — INSULIN HUMAN 8 UNITS: 100 INJECTION, SUSPENSION SUBCUTANEOUS at 07:44

## 2024-01-01 RX ADMIN — HEPARIN SODIUM 5000 UNITS: 5000 INJECTION, SOLUTION INTRAVENOUS; SUBCUTANEOUS at 06:50

## 2024-01-01 RX ADMIN — Medication 25 MG: at 21:01

## 2024-01-01 RX ADMIN — LEVALBUTEROL HYDROCHLORIDE 0.63 MG: 0.63 SOLUTION RESPIRATORY (INHALATION) at 07:47

## 2024-01-01 RX ADMIN — INSULIN ASPART 1 UNITS: 100 INJECTION, SOLUTION INTRAVENOUS; SUBCUTANEOUS at 00:30

## 2024-01-01 RX ADMIN — INSULIN HUMAN 8 UNITS: 100 INJECTION, SUSPENSION SUBCUTANEOUS at 19:59

## 2024-01-01 RX ADMIN — SODIUM CHLORIDE 300 ML: 9 INJECTION, SOLUTION INTRAVENOUS at 15:56

## 2024-01-01 RX ADMIN — POSACONAZOLE 300 MG: 100 TABLET, DELAYED RELEASE ORAL at 12:26

## 2024-01-01 RX ADMIN — CALCIUM CHLORIDE, MAGNESIUM CHLORIDE, SODIUM CHLORIDE, SODIUM BICARBONATE, POTASSIUM CHLORIDE AND SODIUM PHOSPHATE DIBASIC DIHYDRATE 12.5 ML/KG/HR: 3.68; 3.05; 6.34; 3.09; .314; .187 INJECTION INTRAVENOUS at 11:05

## 2024-01-01 RX ADMIN — CHLORHEXIDINE GLUCONATE 0.12% ORAL RINSE 15 ML: 1.2 LIQUID ORAL at 20:39

## 2024-01-01 RX ADMIN — RAMELTEON 8 MG: 8 TABLET ORAL at 21:10

## 2024-01-01 RX ADMIN — PIPERACILLIN AND TAZOBACTAM 2.25 G: 2; .25 INJECTION, POWDER, FOR SOLUTION INTRAVENOUS at 06:03

## 2024-01-01 RX ADMIN — PIPERACILLIN AND TAZOBACTAM 2.25 G: 2; .25 INJECTION, POWDER, FOR SOLUTION INTRAVENOUS at 23:31

## 2024-01-01 RX ADMIN — LORAZEPAM 0.5 MG: 2 LIQUID ORAL at 17:46

## 2024-01-01 RX ADMIN — Medication 1 PACKET: at 08:24

## 2024-01-01 RX ADMIN — LEVALBUTEROL HYDROCHLORIDE 0.63 MG: 0.63 SOLUTION RESPIRATORY (INHALATION) at 21:01

## 2024-01-01 RX ADMIN — Medication 5 ML: at 08:07

## 2024-01-01 RX ADMIN — CALCIUM CHLORIDE, MAGNESIUM CHLORIDE, SODIUM CHLORIDE, SODIUM BICARBONATE, POTASSIUM CHLORIDE AND SODIUM PHOSPHATE DIBASIC DIHYDRATE: 3.68; 3.05; 6.34; 3.09; .314; .187 INJECTION INTRAVENOUS at 21:53

## 2024-01-01 RX ADMIN — ANORECTAL OINTMENT: 15.7; .44; 24; 20.6 OINTMENT TOPICAL at 00:23

## 2024-01-01 RX ADMIN — ACETAMINOPHEN 650 MG: 325 TABLET, FILM COATED ORAL at 20:53

## 2024-01-01 RX ADMIN — SODIUM CHLORIDE SOLN NEBU 3% 3 ML: 3 NEBU SOLN at 12:59

## 2024-01-01 RX ADMIN — SULFAMETHOXAZOLE AND TRIMETHOPRIM 1 TABLET: 400; 80 TABLET ORAL at 20:40

## 2024-01-01 RX ADMIN — FENTANYL CITRATE 50 MCG: 50 INJECTION, SOLUTION INTRAMUSCULAR; INTRAVENOUS at 09:12

## 2024-01-01 RX ADMIN — FENTANYL CITRATE 50 MCG: 50 INJECTION, SOLUTION INTRAMUSCULAR; INTRAVENOUS at 11:48

## 2024-01-01 RX ADMIN — LEVALBUTEROL HYDROCHLORIDE 0.63 MG: 0.63 SOLUTION RESPIRATORY (INHALATION) at 11:13

## 2024-01-01 RX ADMIN — Medication 0.5 MCG/KG/MIN: at 09:20

## 2024-01-01 RX ADMIN — FLUDROCORTISONE ACETATE 0.1 MG: 0.1 TABLET ORAL at 07:48

## 2024-01-01 RX ADMIN — MIDODRINE HYDROCHLORIDE 20 MG: 5 TABLET ORAL at 05:52

## 2024-01-01 RX ADMIN — Medication 1 PACKET: at 16:12

## 2024-01-01 RX ADMIN — OXYCODONE HYDROCHLORIDE 5 MG: 100 SOLUTION ORAL at 16:33

## 2024-01-01 RX ADMIN — MONTELUKAST 10 MG: 10 TABLET, FILM COATED ORAL at 21:07

## 2024-01-01 RX ADMIN — LEVALBUTEROL HYDROCHLORIDE 0.63 MG: 0.63 SOLUTION RESPIRATORY (INHALATION) at 09:19

## 2024-01-01 RX ADMIN — RAMELTEON 8 MG: 8 TABLET, FILM COATED ORAL at 21:34

## 2024-01-01 RX ADMIN — ACETAMINOPHEN 650 MG: 325 TABLET, FILM COATED ORAL at 11:10

## 2024-01-01 RX ADMIN — POSACONAZOLE 300 MG: 100 TABLET, DELAYED RELEASE ORAL at 20:45

## 2024-01-01 RX ADMIN — ACETYLCYSTEINE 4 ML: 100 SOLUTION ORAL; RESPIRATORY (INHALATION) at 08:34

## 2024-01-01 RX ADMIN — POSACONAZOLE 300 MG: 100 TABLET, DELAYED RELEASE ORAL at 13:07

## 2024-01-01 RX ADMIN — PREDNISONE 50 MG: 50 TABLET ORAL at 09:10

## 2024-01-01 RX ADMIN — AZITHROMYCIN 125 MG: 200 POWDER, FOR SUSPENSION PARENTERAL at 08:33

## 2024-01-01 RX ADMIN — CEFTAZIDIME 2 G: 2 INJECTION, POWDER, FOR SOLUTION INTRAVENOUS at 04:20

## 2024-01-01 RX ADMIN — HEPARIN SODIUM 5000 UNITS: 5000 INJECTION, SOLUTION INTRAVENOUS; SUBCUTANEOUS at 06:16

## 2024-01-01 RX ADMIN — LORAZEPAM 0.5 MG: 2 INJECTION INTRAMUSCULAR; INTRAVENOUS at 16:32

## 2024-01-01 RX ADMIN — LORAZEPAM 1 MG: 2 LIQUID ORAL at 10:26

## 2024-01-01 RX ADMIN — MONTELUKAST 10 MG: 10 TABLET, FILM COATED ORAL at 21:15

## 2024-01-01 RX ADMIN — Medication 0.5 UNITS: at 12:35

## 2024-01-01 RX ADMIN — CHLORHEXIDINE GLUCONATE 0.12% ORAL RINSE 15 ML: 1.2 LIQUID ORAL at 08:37

## 2024-01-01 RX ADMIN — POSACONAZOLE 300 MG: 100 TABLET, DELAYED RELEASE ORAL at 13:21

## 2024-01-01 RX ADMIN — HEPARIN SODIUM 500 UNITS/HR: 1000 INJECTION INTRAVENOUS; SUBCUTANEOUS at 14:19

## 2024-01-01 RX ADMIN — LORAZEPAM 1 MG: 2 LIQUID ORAL at 23:55

## 2024-01-01 RX ADMIN — Medication 100 MCG/HR: at 04:52

## 2024-01-01 RX ADMIN — LEVALBUTEROL HYDROCHLORIDE 0.63 MG: 0.63 SOLUTION RESPIRATORY (INHALATION) at 08:29

## 2024-01-01 RX ADMIN — INSULIN ASPART 1 UNITS: 100 INJECTION, SOLUTION INTRAVENOUS; SUBCUTANEOUS at 08:31

## 2024-01-01 RX ADMIN — TACROLIMUS 0.8 MG: 5 CAPSULE ORAL at 17:48

## 2024-01-01 RX ADMIN — INSULIN HUMAN 2 UNITS/HR: 1 INJECTION, SOLUTION INTRAVENOUS at 05:09

## 2024-01-01 RX ADMIN — LIDOCAINE HYDROCHLORIDE 5 ML: 10 INJECTION, SOLUTION EPIDURAL; INFILTRATION; INTRACAUDAL; PERINEURAL at 12:05

## 2024-01-01 RX ADMIN — TACROLIMUS 0.5 MG: 5 CAPSULE ORAL at 08:02

## 2024-01-01 RX ADMIN — CARVEDILOL 6.25 MG: 6.25 TABLET, FILM COATED ORAL at 21:53

## 2024-01-01 RX ADMIN — HYDROMORPHONE HYDROCHLORIDE 0.5 MG: 1 INJECTION, SOLUTION INTRAMUSCULAR; INTRAVENOUS; SUBCUTANEOUS at 00:00

## 2024-01-01 RX ADMIN — ACETAMINOPHEN 325 MG: 325 TABLET, FILM COATED ORAL at 23:01

## 2024-01-01 RX ADMIN — Medication 1 PACKET: at 21:29

## 2024-01-01 RX ADMIN — LEVALBUTEROL HYDROCHLORIDE 0.63 MG: 0.63 SOLUTION RESPIRATORY (INHALATION) at 10:18

## 2024-01-01 RX ADMIN — HYDROMORPHONE HYDROCHLORIDE 0.2 MG: 0.2 INJECTION, SOLUTION INTRAMUSCULAR; INTRAVENOUS; SUBCUTANEOUS at 12:37

## 2024-01-01 RX ADMIN — TACROLIMUS 0.4 MG: 5 CAPSULE ORAL at 18:55

## 2024-01-01 RX ADMIN — ESCITALOPRAM 10 MG: 5 SOLUTION ORAL at 08:44

## 2024-01-01 RX ADMIN — LINEZOLID 600 MG: 600 TABLET, FILM COATED ORAL at 12:37

## 2024-01-01 RX ADMIN — MONTELUKAST 10 MG: 10 TABLET, FILM COATED ORAL at 21:25

## 2024-01-01 RX ADMIN — LEVALBUTEROL HYDROCHLORIDE 0.63 MG: 0.63 SOLUTION RESPIRATORY (INHALATION) at 20:42

## 2024-01-01 RX ADMIN — QUETIAPINE FUMARATE 50 MG: 50 TABLET ORAL at 22:18

## 2024-01-01 RX ADMIN — METOPROLOL TARTRATE 25 MG: 25 TABLET, FILM COATED ORAL at 19:55

## 2024-01-01 RX ADMIN — ACETAMINOPHEN 325 MG: 325 TABLET, FILM COATED ORAL at 21:00

## 2024-01-01 RX ADMIN — INSULIN ASPART 2 UNITS: 100 INJECTION, SOLUTION INTRAVENOUS; SUBCUTANEOUS at 16:58

## 2024-01-01 RX ADMIN — CHLORHEXIDINE GLUCONATE 0.12% ORAL RINSE 15 ML: 1.2 LIQUID ORAL at 19:36

## 2024-01-01 RX ADMIN — DEXMEDETOMIDINE HYDROCHLORIDE 0.5 MCG/KG/HR: 4 INJECTION, SOLUTION INTRAVENOUS at 06:29

## 2024-01-01 RX ADMIN — LEVALBUTEROL HYDROCHLORIDE 0.63 MG: 0.63 SOLUTION RESPIRATORY (INHALATION) at 15:37

## 2024-01-01 RX ADMIN — INSULIN HUMAN 8 UNITS: 100 INJECTION, SUSPENSION SUBCUTANEOUS at 21:06

## 2024-01-01 RX ADMIN — ACETYLCYSTEINE 4 ML: 100 SOLUTION ORAL; RESPIRATORY (INHALATION) at 08:45

## 2024-01-01 RX ADMIN — Medication 1 TABLET: at 07:59

## 2024-01-01 RX ADMIN — SODIUM CHLORIDE SOLN NEBU 3% 3 ML: 3 NEBU SOLN at 12:10

## 2024-01-01 RX ADMIN — CARVEDILOL 6.25 MG: 6.25 TABLET, FILM COATED ORAL at 08:34

## 2024-01-01 RX ADMIN — ALBUTEROL SULFATE 2.5 MG: 2.5 SOLUTION RESPIRATORY (INHALATION) at 20:28

## 2024-01-01 RX ADMIN — HYDROCORTISONE SODIUM SUCCINATE 25 MG: 100 INJECTION, POWDER, FOR SOLUTION INTRAMUSCULAR; INTRAVENOUS at 19:55

## 2024-01-01 RX ADMIN — Medication 1 PACKET: at 20:03

## 2024-01-01 RX ADMIN — LEVALBUTEROL HYDROCHLORIDE 0.63 MG: 0.63 SOLUTION RESPIRATORY (INHALATION) at 11:52

## 2024-01-01 RX ADMIN — HEPARIN SODIUM 5000 UNITS: 5000 INJECTION, SOLUTION INTRAVENOUS; SUBCUTANEOUS at 13:05

## 2024-01-01 RX ADMIN — LEVALBUTEROL HYDROCHLORIDE 0.63 MG: 0.63 SOLUTION RESPIRATORY (INHALATION) at 07:38

## 2024-01-01 RX ADMIN — Medication 4 UNITS/HR: at 12:12

## 2024-01-01 RX ADMIN — INSULIN ASPART 3 UNITS: 100 INJECTION, SOLUTION INTRAVENOUS; SUBCUTANEOUS at 00:27

## 2024-01-01 RX ADMIN — INSULIN ASPART 2 UNITS: 100 INJECTION, SOLUTION INTRAVENOUS; SUBCUTANEOUS at 14:51

## 2024-01-01 RX ADMIN — ACETYLCYSTEINE 2 ML: 200 SOLUTION ORAL; RESPIRATORY (INHALATION) at 15:52

## 2024-01-01 RX ADMIN — CHLORHEXIDINE GLUCONATE 0.12% ORAL RINSE 15 ML: 1.2 LIQUID ORAL at 19:59

## 2024-01-01 RX ADMIN — PREDNISONE 50 MG: 50 TABLET ORAL at 08:37

## 2024-01-01 RX ADMIN — LORAZEPAM 1 MG: 2 LIQUID ORAL at 00:20

## 2024-01-01 RX ADMIN — CARVEDILOL 6.25 MG: 6.25 TABLET, FILM COATED ORAL at 20:57

## 2024-01-01 RX ADMIN — INSULIN ASPART 2 UNITS: 100 INJECTION, SOLUTION INTRAVENOUS; SUBCUTANEOUS at 23:15

## 2024-01-01 RX ADMIN — HEPARIN SODIUM 5000 UNITS: 5000 INJECTION, SOLUTION INTRAVENOUS; SUBCUTANEOUS at 23:10

## 2024-01-01 RX ADMIN — TACROLIMUS 0.6 MG: 5 CAPSULE ORAL at 07:54

## 2024-01-01 RX ADMIN — ACETYLCYSTEINE 4 ML: 100 SOLUTION ORAL; RESPIRATORY (INHALATION) at 16:33

## 2024-01-01 RX ADMIN — POSACONAZOLE 300 MG: 100 TABLET, DELAYED RELEASE ORAL at 13:17

## 2024-01-01 RX ADMIN — PREDNISONE 50 MG: 50 TABLET ORAL at 08:19

## 2024-01-01 RX ADMIN — LEVALBUTEROL HYDROCHLORIDE 0.63 MG: 0.63 SOLUTION RESPIRATORY (INHALATION) at 11:44

## 2024-01-01 RX ADMIN — LEVALBUTEROL HYDROCHLORIDE 0.63 MG: 0.63 SOLUTION RESPIRATORY (INHALATION) at 21:23

## 2024-01-01 RX ADMIN — Medication 60 ML: at 08:48

## 2024-01-01 RX ADMIN — ACETYLCYSTEINE 2 ML: 200 SOLUTION ORAL; RESPIRATORY (INHALATION) at 07:38

## 2024-01-01 RX ADMIN — CHLORHEXIDINE GLUCONATE 0.12% ORAL RINSE 15 ML: 1.2 LIQUID ORAL at 20:07

## 2024-01-01 RX ADMIN — Medication 1 UNITS: at 22:33

## 2024-01-01 RX ADMIN — Medication 40 MG: at 12:47

## 2024-01-01 RX ADMIN — CALCIUM CHLORIDE, MAGNESIUM CHLORIDE, SODIUM CHLORIDE, SODIUM BICARBONATE, POTASSIUM CHLORIDE AND SODIUM PHOSPHATE DIBASIC DIHYDRATE 12.5 ML/KG/HR: 3.68; 3.05; 6.34; 3.09; .314; .187 INJECTION INTRAVENOUS at 03:55

## 2024-01-01 RX ADMIN — HEPARIN SODIUM 5000 UNITS: 5000 INJECTION, SOLUTION INTRAVENOUS; SUBCUTANEOUS at 14:51

## 2024-01-01 RX ADMIN — Medication 60 ML: at 08:43

## 2024-01-01 RX ADMIN — SODIUM CHLORIDE SOLN NEBU 3% 3 ML: 3 NEBU SOLN at 20:43

## 2024-01-01 RX ADMIN — CHLORHEXIDINE GLUCONATE 0.12% ORAL RINSE 15 ML: 1.2 LIQUID ORAL at 08:06

## 2024-01-01 RX ADMIN — ACETAMINOPHEN 325 MG: 325 TABLET, FILM COATED ORAL at 09:46

## 2024-01-01 RX ADMIN — CHLORHEXIDINE GLUCONATE 0.12% ORAL RINSE 15 ML: 1.2 LIQUID ORAL at 08:28

## 2024-01-01 RX ADMIN — TACROLIMUS 0.8 MG: 5 CAPSULE ORAL at 18:16

## 2024-01-01 RX ADMIN — Medication 1 MG: at 17:46

## 2024-01-01 RX ADMIN — Medication 1 PACKET: at 08:05

## 2024-01-01 RX ADMIN — Medication 60 ML: at 08:33

## 2024-01-01 RX ADMIN — QUETIAPINE FUMARATE 50 MG: 50 TABLET ORAL at 07:48

## 2024-01-01 RX ADMIN — PIPERACILLIN AND TAZOBACTAM 4.5 G: 4; .5 INJECTION, POWDER, FOR SOLUTION INTRAVENOUS at 19:34

## 2024-01-01 RX ADMIN — SODIUM CHLORIDE 250 ML: 9 INJECTION, SOLUTION INTRAVENOUS at 14:18

## 2024-01-01 RX ADMIN — MIDODRINE HYDROCHLORIDE 20 MG: 5 TABLET ORAL at 05:14

## 2024-01-01 RX ADMIN — AZITHROMYCIN 125 MG: 200 POWDER, FOR SUSPENSION PARENTERAL at 08:19

## 2024-01-01 RX ADMIN — LEVALBUTEROL HYDROCHLORIDE 0.63 MG: 0.63 SOLUTION RESPIRATORY (INHALATION) at 20:58

## 2024-01-01 RX ADMIN — ACETAMINOPHEN 650 MG: 325 TABLET, FILM COATED ORAL at 04:24

## 2024-01-01 RX ADMIN — LORAZEPAM 0.5 MG: 2 LIQUID ORAL at 19:39

## 2024-01-01 RX ADMIN — HYDROCORTISONE SODIUM SUCCINATE 50 MG: 100 INJECTION, POWDER, FOR SOLUTION INTRAMUSCULAR; INTRAVENOUS at 03:57

## 2024-01-01 RX ADMIN — NOREPINEPHRINE BITARTRATE 0.14 MCG/KG/MIN: 1 SOLUTION INTRAVENOUS at 10:24

## 2024-01-01 RX ADMIN — QUETIAPINE FUMARATE 100 MG: 50 TABLET ORAL at 21:18

## 2024-01-01 RX ADMIN — PIPERACILLIN AND TAZOBACTAM 2.25 G: 2; .25 INJECTION, POWDER, FOR SOLUTION INTRAVENOUS at 18:13

## 2024-01-01 RX ADMIN — CHLORHEXIDINE GLUCONATE 0.12% ORAL RINSE 15 ML: 1.2 LIQUID ORAL at 21:27

## 2024-01-01 RX ADMIN — MIDODRINE HYDROCHLORIDE 5 MG: 5 TABLET ORAL at 15:00

## 2024-01-01 RX ADMIN — ACETYLCYSTEINE 4 ML: 100 SOLUTION ORAL; RESPIRATORY (INHALATION) at 09:26

## 2024-01-01 RX ADMIN — Medication 1 MG: at 17:57

## 2024-01-01 RX ADMIN — ALBUTEROL SULFATE 2.5 MG: 2.5 SOLUTION RESPIRATORY (INHALATION) at 08:53

## 2024-01-01 RX ADMIN — LEVALBUTEROL HYDROCHLORIDE 0.63 MG: 0.63 SOLUTION RESPIRATORY (INHALATION) at 09:18

## 2024-01-01 RX ADMIN — FENTANYL CITRATE 25 MCG: 50 INJECTION, SOLUTION INTRAMUSCULAR; INTRAVENOUS at 12:41

## 2024-01-01 RX ADMIN — Medication 0.05 MG: at 08:11

## 2024-01-01 RX ADMIN — RAMELTEON 8 MG: 8 TABLET ORAL at 22:21

## 2024-01-01 RX ADMIN — CALCIUM CHLORIDE, MAGNESIUM CHLORIDE, SODIUM CHLORIDE, SODIUM BICARBONATE, POTASSIUM CHLORIDE AND SODIUM PHOSPHATE DIBASIC DIHYDRATE 12.5 ML/KG/HR: 3.68; 3.05; 6.34; 3.09; .314; .187 INJECTION INTRAVENOUS at 06:34

## 2024-01-01 RX ADMIN — LORAZEPAM 1 MG: 2 INJECTION INTRAMUSCULAR; INTRAVENOUS at 14:34

## 2024-01-01 RX ADMIN — INSULIN ASPART 1 UNITS: 100 INJECTION, SOLUTION INTRAVENOUS; SUBCUTANEOUS at 12:56

## 2024-01-01 RX ADMIN — PROPOFOL 20 MG: 10 INJECTION, EMULSION INTRAVENOUS at 14:07

## 2024-01-01 RX ADMIN — CHLORHEXIDINE GLUCONATE 0.12% ORAL RINSE 15 ML: 1.2 LIQUID ORAL at 20:03

## 2024-01-01 RX ADMIN — SODIUM CHLORIDE SOLN NEBU 3% 3 ML: 3 NEBU SOLN at 12:26

## 2024-01-01 RX ADMIN — SULFAMETHOXAZOLE AND TRIMETHOPRIM 1 TABLET: 400; 80 TABLET ORAL at 20:28

## 2024-01-01 RX ADMIN — DEXMEDETOMIDINE HYDROCHLORIDE 1 MCG/KG/HR: 4 INJECTION, SOLUTION INTRAVENOUS at 16:05

## 2024-01-01 RX ADMIN — LEVALBUTEROL HYDROCHLORIDE 0.63 MG: 0.63 SOLUTION RESPIRATORY (INHALATION) at 11:22

## 2024-01-01 RX ADMIN — Medication 1 MG: at 19:04

## 2024-01-01 RX ADMIN — NOREPINEPHRINE BITARTRATE 0.06 MCG/KG/MIN: 0.06 INJECTION, SOLUTION INTRAVENOUS at 17:22

## 2024-01-01 RX ADMIN — TACROLIMUS 1.5 MG: 5 CAPSULE ORAL at 20:50

## 2024-01-01 RX ADMIN — ACETYLCYSTEINE 4 ML: 100 SOLUTION ORAL; RESPIRATORY (INHALATION) at 16:51

## 2024-01-01 RX ADMIN — METOPROLOL TARTRATE 25 MG: 25 TABLET, FILM COATED ORAL at 08:49

## 2024-01-01 RX ADMIN — DEXMEDETOMIDINE HYDROCHLORIDE 0.2 MCG/KG/HR: 4 INJECTION, SOLUTION INTRAVENOUS at 22:23

## 2024-01-01 RX ADMIN — INSULIN ASPART 2 UNITS: 100 INJECTION, SOLUTION INTRAVENOUS; SUBCUTANEOUS at 12:10

## 2024-01-01 RX ADMIN — MONTELUKAST 10 MG: 10 TABLET, FILM COATED ORAL at 21:34

## 2024-01-01 RX ADMIN — SULFAMETHOXAZOLE AND TRIMETHOPRIM 1 TABLET: 400; 80 TABLET ORAL at 20:18

## 2024-01-01 RX ADMIN — CALCIUM CARBONATE (ANTACID) CHEW TAB 500 MG 500 MG: 500 CHEW TAB at 08:18

## 2024-01-01 RX ADMIN — LINEZOLID 600 MG: 600 TABLET, FILM COATED ORAL at 00:11

## 2024-01-01 RX ADMIN — EPOETIN ALFA-EPBX 4000 UNITS: 10000 INJECTION, SOLUTION INTRAVENOUS; SUBCUTANEOUS at 16:03

## 2024-01-01 RX ADMIN — HEPARIN SODIUM 5000 UNITS: 5000 INJECTION, SOLUTION INTRAVENOUS; SUBCUTANEOUS at 14:54

## 2024-01-01 RX ADMIN — HYDROCORTISONE SODIUM SUCCINATE 25 MG: 100 INJECTION, POWDER, FOR SOLUTION INTRAMUSCULAR; INTRAVENOUS at 08:05

## 2024-01-01 RX ADMIN — LORAZEPAM 1 MG: 2 INJECTION INTRAMUSCULAR; INTRAVENOUS at 18:25

## 2024-01-01 RX ADMIN — LEVALBUTEROL HYDROCHLORIDE 0.63 MG: 0.63 SOLUTION RESPIRATORY (INHALATION) at 20:08

## 2024-01-01 RX ADMIN — DEXTROSE MONOHYDRATE 1000 ML: 100 INJECTION, SOLUTION INTRAVENOUS at 13:58

## 2024-01-01 RX ADMIN — QUETIAPINE FUMARATE 25 MG: 25 TABLET ORAL at 16:27

## 2024-01-01 RX ADMIN — SODIUM CHLORIDE SOLN NEBU 3% 3 ML: 3 NEBU SOLN at 12:24

## 2024-01-01 RX ADMIN — TACROLIMUS 1 MG: 5 CAPSULE ORAL at 23:02

## 2024-01-01 RX ADMIN — MICAFUNGIN SODIUM 150 MG: 50 INJECTION, POWDER, LYOPHILIZED, FOR SOLUTION INTRAVENOUS at 00:10

## 2024-01-01 RX ADMIN — Medication 1 MG: at 08:57

## 2024-01-01 RX ADMIN — POTASSIUM CHLORIDE 20 MEQ: 29.8 INJECTION, SOLUTION INTRAVENOUS at 05:56

## 2024-01-01 RX ADMIN — QUETIAPINE FUMARATE 25 MG: 25 TABLET ORAL at 21:41

## 2024-01-01 RX ADMIN — MEROPENEM 1 G: 1 INJECTION, POWDER, FOR SOLUTION INTRAVENOUS at 17:19

## 2024-01-01 RX ADMIN — LEVALBUTEROL HYDROCHLORIDE 0.63 MG: 0.63 SOLUTION RESPIRATORY (INHALATION) at 19:56

## 2024-01-01 RX ADMIN — VALGANCICLOVIR HYDROCHLORIDE 450 MG: 50 POWDER, FOR SOLUTION ORAL at 21:35

## 2024-01-01 RX ADMIN — SULFAMETHOXAZOLE AND TRIMETHOPRIM 1 TABLET: 400; 80 TABLET ORAL at 20:41

## 2024-01-01 RX ADMIN — Medication 40 MG: at 23:42

## 2024-01-01 RX ADMIN — RAMELTEON 8 MG: 8 TABLET ORAL at 22:57

## 2024-01-01 RX ADMIN — TACROLIMUS 0.8 MG: 5 CAPSULE ORAL at 08:48

## 2024-01-01 RX ADMIN — POSACONAZOLE 300 MG: 100 TABLET, DELAYED RELEASE ORAL at 13:32

## 2024-01-01 RX ADMIN — EPOETIN ALFA-EPBX 4000 UNITS: 10000 INJECTION, SOLUTION INTRAVENOUS; SUBCUTANEOUS at 17:28

## 2024-01-01 RX ADMIN — INSULIN ASPART 1 UNITS: 100 INJECTION, SOLUTION INTRAVENOUS; SUBCUTANEOUS at 04:29

## 2024-01-01 RX ADMIN — CALCIUM CARBONATE (ANTACID) CHEW TAB 500 MG 500 MG: 500 CHEW TAB at 07:48

## 2024-01-01 RX ADMIN — Medication 1 PACKET: at 14:27

## 2024-01-01 RX ADMIN — INSULIN ASPART 1 UNITS: 100 INJECTION, SOLUTION INTRAVENOUS; SUBCUTANEOUS at 05:05

## 2024-01-01 RX ADMIN — CALCIUM CHLORIDE, MAGNESIUM CHLORIDE, SODIUM CHLORIDE, SODIUM BICARBONATE, POTASSIUM CHLORIDE AND SODIUM PHOSPHATE DIBASIC DIHYDRATE 12.5 ML/KG/HR: 3.68; 3.05; 6.34; 3.09; .314; .187 INJECTION INTRAVENOUS at 13:54

## 2024-01-01 RX ADMIN — DEXMEDETOMIDINE HYDROCHLORIDE 1.2 MCG/KG/HR: 4 INJECTION, SOLUTION INTRAVENOUS at 05:55

## 2024-01-01 RX ADMIN — LINEZOLID 600 MG: 600 TABLET, FILM COATED ORAL at 12:08

## 2024-01-01 RX ADMIN — ONDANSETRON 4 MG: 2 INJECTION INTRAMUSCULAR; INTRAVENOUS at 09:15

## 2024-01-01 RX ADMIN — SODIUM CHLORIDE SOLN NEBU 3% 3 ML: 3 NEBU SOLN at 20:04

## 2024-01-01 RX ADMIN — Medication 1 PACKET: at 20:10

## 2024-01-01 RX ADMIN — SODIUM CHLORIDE 300 ML: 9 INJECTION, SOLUTION INTRAVENOUS at 16:45

## 2024-01-01 RX ADMIN — CARVEDILOL 6.25 MG: 6.25 TABLET, FILM COATED ORAL at 20:52

## 2024-01-01 RX ADMIN — LORAZEPAM 1 MG: 2 LIQUID ORAL at 15:13

## 2024-01-01 RX ADMIN — FENTANYL CITRATE 100 MCG: 50 INJECTION, SOLUTION INTRAMUSCULAR; INTRAVENOUS at 14:22

## 2024-01-01 RX ADMIN — MINOCYCLINE HYDROCHLORIDE 100 MG: 100 INJECTION INTRAVENOUS at 23:31

## 2024-01-01 RX ADMIN — MIDODRINE HYDROCHLORIDE 15 MG: 5 TABLET ORAL at 14:04

## 2024-01-01 RX ADMIN — Medication 5 ML: at 15:48

## 2024-01-01 RX ADMIN — HEPARIN SODIUM 5000 UNITS: 5000 INJECTION, SOLUTION INTRAVENOUS; SUBCUTANEOUS at 13:18

## 2024-01-01 RX ADMIN — OXYCODONE HYDROCHLORIDE 5 MG: 100 SOLUTION ORAL at 16:52

## 2024-01-01 RX ADMIN — CHLORHEXIDINE GLUCONATE 0.12% ORAL RINSE 15 ML: 1.2 LIQUID ORAL at 20:19

## 2024-01-01 RX ADMIN — MICAFUNGIN SODIUM 150 MG: 50 INJECTION, POWDER, LYOPHILIZED, FOR SOLUTION INTRAVENOUS at 23:06

## 2024-01-01 RX ADMIN — Medication 3 MG: at 20:12

## 2024-01-01 RX ADMIN — TACROLIMUS 0.6 MG: 5 CAPSULE ORAL at 08:18

## 2024-01-01 RX ADMIN — LORAZEPAM 1 MG: 2 LIQUID ORAL at 06:11

## 2024-01-01 RX ADMIN — TACROLIMUS 0.4 MG: 5 CAPSULE ORAL at 08:00

## 2024-01-01 RX ADMIN — INSULIN ASPART 1 UNITS: 100 INJECTION, SOLUTION INTRAVENOUS; SUBCUTANEOUS at 00:24

## 2024-01-01 RX ADMIN — SODIUM CHLORIDE SOLN NEBU 3% 3 ML: 3 NEBU SOLN at 11:54

## 2024-01-01 RX ADMIN — CALCIUM CHLORIDE, MAGNESIUM CHLORIDE, SODIUM CHLORIDE, SODIUM BICARBONATE, POTASSIUM CHLORIDE AND SODIUM PHOSPHATE DIBASIC DIHYDRATE 12.5 ML/KG/HR: 3.68; 3.05; 6.34; 3.09; .314; .187 INJECTION INTRAVENOUS at 03:25

## 2024-01-01 RX ADMIN — ACETYLCYSTEINE 4 ML: 100 SOLUTION ORAL; RESPIRATORY (INHALATION) at 08:28

## 2024-01-01 RX ADMIN — INSULIN ASPART 2 UNITS: 100 INJECTION, SOLUTION INTRAVENOUS; SUBCUTANEOUS at 20:00

## 2024-01-01 RX ADMIN — MONTELUKAST 10 MG: 10 TABLET, FILM COATED ORAL at 22:19

## 2024-01-01 RX ADMIN — LIDOCAINE HYDROCHLORIDE 0.5 ML: 10 INJECTION, SOLUTION EPIDURAL; INFILTRATION; INTRACAUDAL; PERINEURAL at 16:41

## 2024-01-01 RX ADMIN — QUETIAPINE FUMARATE 50 MG: 50 TABLET ORAL at 21:06

## 2024-01-01 RX ADMIN — LIDOCAINE HYDROCHLORIDE 0.5 ML: 10 INJECTION, SOLUTION EPIDURAL; INFILTRATION; INTRACAUDAL; PERINEURAL at 09:09

## 2024-01-01 RX ADMIN — TACROLIMUS 0.8 MG: 5 CAPSULE ORAL at 08:23

## 2024-01-01 RX ADMIN — Medication 1 PACKET: at 22:02

## 2024-01-01 RX ADMIN — Medication 1 TABLET: at 08:51

## 2024-01-01 RX ADMIN — CHLORHEXIDINE GLUCONATE 0.12% ORAL RINSE 15 ML: 1.2 LIQUID ORAL at 09:13

## 2024-01-01 RX ADMIN — LEVALBUTEROL HYDROCHLORIDE 0.63 MG: 0.63 SOLUTION RESPIRATORY (INHALATION) at 07:31

## 2024-01-01 RX ADMIN — DEXMEDETOMIDINE HYDROCHLORIDE 1.2 MCG/KG/HR: 4 INJECTION, SOLUTION INTRAVENOUS at 13:47

## 2024-01-01 RX ADMIN — HEPARIN SODIUM 5000 UNITS: 5000 INJECTION, SOLUTION INTRAVENOUS; SUBCUTANEOUS at 06:07

## 2024-01-01 RX ADMIN — ALBUTEROL SULFATE 2.5 MG: 2.5 SOLUTION RESPIRATORY (INHALATION) at 20:57

## 2024-01-01 RX ADMIN — LIDOCAINE HYDROCHLORIDE 0.5 ML: 10 INJECTION, SOLUTION EPIDURAL; INFILTRATION; INTRACAUDAL; PERINEURAL at 14:23

## 2024-01-01 RX ADMIN — AZITHROMYCIN DIHYDRATE 250 MG: 250 TABLET ORAL at 07:59

## 2024-01-01 RX ADMIN — POSACONAZOLE 300 MG: 100 TABLET, DELAYED RELEASE ORAL at 11:50

## 2024-01-01 RX ADMIN — Medication 30 MG: at 14:02

## 2024-01-01 RX ADMIN — Medication 0.5 MCG/KG/MIN: at 22:14

## 2024-01-01 RX ADMIN — ACETAMINOPHEN 325 MG: 325 TABLET, FILM COATED ORAL at 19:55

## 2024-01-01 RX ADMIN — CHLORHEXIDINE GLUCONATE 0.12% ORAL RINSE 15 ML: 1.2 LIQUID ORAL at 19:53

## 2024-01-01 RX ADMIN — HYDROCORTISONE SODIUM SUCCINATE 50 MG: 100 INJECTION, POWDER, FOR SOLUTION INTRAMUSCULAR; INTRAVENOUS at 09:59

## 2024-01-01 RX ADMIN — DEXTROSE MONOHYDRATE: 100 INJECTION, SOLUTION INTRAVENOUS at 00:00

## 2024-01-01 RX ADMIN — LEVALBUTEROL HYDROCHLORIDE 0.63 MG: 0.63 SOLUTION RESPIRATORY (INHALATION) at 19:57

## 2024-01-01 RX ADMIN — LEVALBUTEROL HYDROCHLORIDE 0.63 MG: 0.63 SOLUTION RESPIRATORY (INHALATION) at 11:31

## 2024-01-01 RX ADMIN — SODIUM CHLORIDE SOLN NEBU 3% 3 ML: 3 NEBU SOLN at 21:17

## 2024-01-01 RX ADMIN — HEPARIN SODIUM 5000 UNITS: 5000 INJECTION, SOLUTION INTRAVENOUS; SUBCUTANEOUS at 06:29

## 2024-01-01 RX ADMIN — TACROLIMUS 0.8 MG: 5 CAPSULE ORAL at 18:19

## 2024-01-01 RX ADMIN — HEPARIN SODIUM 5000 UNITS: 5000 INJECTION, SOLUTION INTRAVENOUS; SUBCUTANEOUS at 21:15

## 2024-01-01 RX ADMIN — MIDODRINE HYDROCHLORIDE 20 MG: 5 TABLET ORAL at 22:20

## 2024-01-01 RX ADMIN — MONTELUKAST 10 MG: 10 TABLET, FILM COATED ORAL at 23:08

## 2024-01-01 RX ADMIN — CALCIUM CHLORIDE, MAGNESIUM CHLORIDE, SODIUM CHLORIDE, SODIUM BICARBONATE, POTASSIUM CHLORIDE AND SODIUM PHOSPHATE DIBASIC DIHYDRATE 12.5 ML/KG/HR: 3.68; 3.05; 6.34; 3.09; .314; .187 INJECTION INTRAVENOUS at 03:10

## 2024-01-01 RX ADMIN — PREDNISONE 5 MG: 5 TABLET ORAL at 08:28

## 2024-01-01 RX ADMIN — Medication 10 MG: at 22:53

## 2024-01-01 RX ADMIN — EPOETIN ALFA-EPBX 4000 UNITS: 10000 INJECTION, SOLUTION INTRAVENOUS; SUBCUTANEOUS at 08:12

## 2024-01-01 RX ADMIN — SODIUM CHLORIDE SOLN NEBU 3% 3 ML: 3 NEBU SOLN at 11:47

## 2024-01-01 RX ADMIN — Medication 40 MG: at 21:35

## 2024-01-01 RX ADMIN — MINOCYCLINE HYDROCHLORIDE 100 MG: 100 INJECTION INTRAVENOUS at 00:02

## 2024-01-01 RX ADMIN — MEROPENEM 1 G: 1 INJECTION, POWDER, FOR SOLUTION INTRAVENOUS at 18:15

## 2024-01-01 RX ADMIN — INSULIN ASPART 2 UNITS: 100 INJECTION, SOLUTION INTRAVENOUS; SUBCUTANEOUS at 04:30

## 2024-01-01 RX ADMIN — MINOCYCLINE HYDROCHLORIDE 100 MG: 100 INJECTION INTRAVENOUS at 12:01

## 2024-01-01 RX ADMIN — TACROLIMUS 1.5 MG: 5 CAPSULE ORAL at 18:15

## 2024-01-01 RX ADMIN — DEXTROSE MONOHYDRATE 25 ML: 25 INJECTION, SOLUTION INTRAVENOUS at 01:17

## 2024-01-01 RX ADMIN — Medication 1 DROP: at 17:37

## 2024-01-01 RX ADMIN — ACETYLCYSTEINE 2 ML: 200 SOLUTION ORAL; RESPIRATORY (INHALATION) at 16:29

## 2024-01-01 RX ADMIN — PROPOFOL 150 MCG/KG/MIN: 10 INJECTION, EMULSION INTRAVENOUS at 07:48

## 2024-01-01 RX ADMIN — Medication 25 MG: at 22:25

## 2024-01-01 RX ADMIN — Medication 1 TABLET: at 07:28

## 2024-01-01 RX ADMIN — NOREPINEPHRINE BITARTRATE 0.13 MCG/KG/MIN: 0.06 INJECTION, SOLUTION INTRAVENOUS at 10:46

## 2024-01-01 RX ADMIN — METOPROLOL TARTRATE 25 MG: 25 TABLET, FILM COATED ORAL at 07:28

## 2024-01-01 RX ADMIN — LORAZEPAM 1 MG: 1 TABLET ORAL at 09:08

## 2024-01-01 RX ADMIN — INSULIN ASPART 1 UNITS: 100 INJECTION, SOLUTION INTRAVENOUS; SUBCUTANEOUS at 16:07

## 2024-01-01 RX ADMIN — LEVALBUTEROL HYDROCHLORIDE 0.63 MG: 0.63 SOLUTION RESPIRATORY (INHALATION) at 16:33

## 2024-01-01 RX ADMIN — DAPTOMYCIN 400 MG: 500 INJECTION, POWDER, LYOPHILIZED, FOR SOLUTION INTRAVENOUS at 14:05

## 2024-01-01 RX ADMIN — QUETIAPINE FUMARATE 25 MG: 25 TABLET ORAL at 21:43

## 2024-01-01 RX ADMIN — Medication 10 MG: at 13:01

## 2024-01-01 RX ADMIN — LEVALBUTEROL HYDROCHLORIDE 0.63 MG: 0.63 SOLUTION RESPIRATORY (INHALATION) at 21:07

## 2024-01-01 RX ADMIN — INSULIN ASPART 5 UNITS: 100 INJECTION, SOLUTION INTRAVENOUS; SUBCUTANEOUS at 04:32

## 2024-01-01 RX ADMIN — PREDNISONE 5 MG: 5 TABLET ORAL at 08:17

## 2024-01-01 RX ADMIN — ACETAMINOPHEN 325 MG: 325 TABLET, FILM COATED ORAL at 04:07

## 2024-01-01 RX ADMIN — LINEZOLID 600 MG: 600 TABLET, FILM COATED ORAL at 23:57

## 2024-01-01 RX ADMIN — CEFTAZIDIME 2 G: 2 INJECTION, POWDER, FOR SOLUTION INTRAVENOUS at 13:18

## 2024-01-01 RX ADMIN — Medication 2.4 UNITS/HR: at 17:34

## 2024-01-01 RX ADMIN — CALCIUM CHLORIDE, MAGNESIUM CHLORIDE, SODIUM CHLORIDE, SODIUM BICARBONATE, POTASSIUM CHLORIDE AND SODIUM PHOSPHATE DIBASIC DIHYDRATE 12.5 ML/KG/HR: 3.68; 3.05; 6.34; 3.09; .314; .187 INJECTION INTRAVENOUS at 13:30

## 2024-01-01 RX ADMIN — Medication 1 PACKET: at 13:58

## 2024-01-01 RX ADMIN — POSACONAZOLE 300 MG: 100 TABLET, DELAYED RELEASE ORAL at 15:00

## 2024-01-01 RX ADMIN — POSACONAZOLE 300 MG: 100 TABLET, DELAYED RELEASE ORAL at 14:21

## 2024-01-01 RX ADMIN — MEROPENEM 1 G: 1 INJECTION, POWDER, FOR SOLUTION INTRAVENOUS at 04:44

## 2024-01-01 RX ADMIN — PREDNISONE 50 MG: 50 TABLET ORAL at 08:29

## 2024-01-01 RX ADMIN — LORAZEPAM 0.5 MG: 2 LIQUID ORAL at 17:56

## 2024-01-01 RX ADMIN — Medication 50 MCG: at 13:02

## 2024-01-01 RX ADMIN — MINOCYCLINE HYDROCHLORIDE 100 MG: 100 INJECTION INTRAVENOUS at 13:59

## 2024-01-01 RX ADMIN — HYDROMORPHONE HYDROCHLORIDE 0.5 MG: 1 INJECTION, SOLUTION INTRAMUSCULAR; INTRAVENOUS; SUBCUTANEOUS at 15:18

## 2024-01-01 RX ADMIN — MINOCYCLINE HYDROCHLORIDE 100 MG: 100 INJECTION INTRAVENOUS at 00:11

## 2024-01-01 RX ADMIN — LEVALBUTEROL HYDROCHLORIDE 0.63 MG: 0.63 SOLUTION RESPIRATORY (INHALATION) at 08:12

## 2024-01-01 RX ADMIN — MIDAZOLAM 2 MG: 1 INJECTION INTRAMUSCULAR; INTRAVENOUS at 11:47

## 2024-01-01 RX ADMIN — MIDODRINE HYDROCHLORIDE 20 MG: 5 TABLET ORAL at 14:04

## 2024-01-01 RX ADMIN — MINOCYCLINE HYDROCHLORIDE 100 MG: 100 INJECTION INTRAVENOUS at 12:59

## 2024-01-01 RX ADMIN — ACETYLCYSTEINE 2 ML: 200 SOLUTION ORAL; RESPIRATORY (INHALATION) at 08:29

## 2024-01-01 RX ADMIN — ONDANSETRON 4 MG: 2 INJECTION INTRAMUSCULAR; INTRAVENOUS at 20:56

## 2024-01-01 RX ADMIN — Medication 100 MCG/HR: at 05:05

## 2024-01-01 RX ADMIN — LORAZEPAM 0.5 MG: 2 INJECTION INTRAMUSCULAR; INTRAVENOUS at 05:32

## 2024-01-01 RX ADMIN — SODIUM CHLORIDE SOLN NEBU 3% 3 ML: 3 NEBU SOLN at 11:22

## 2024-01-01 RX ADMIN — LORAZEPAM 1 MG: 1 TABLET ORAL at 14:00

## 2024-01-01 RX ADMIN — Medication 3 MG: at 20:41

## 2024-01-01 RX ADMIN — MIDAZOLAM 2 MG: 1 INJECTION INTRAMUSCULAR; INTRAVENOUS at 11:11

## 2024-01-01 RX ADMIN — ACETYLCYSTEINE 2 ML: 200 SOLUTION ORAL; RESPIRATORY (INHALATION) at 16:35

## 2024-01-01 RX ADMIN — LINEZOLID 600 MG: 600 TABLET, FILM COATED ORAL at 23:59

## 2024-01-01 RX ADMIN — Medication 5 ML: at 09:13

## 2024-01-01 RX ADMIN — ACETAMINOPHEN 325 MG: 325 TABLET, FILM COATED ORAL at 18:12

## 2024-01-01 RX ADMIN — PIPERACILLIN AND TAZOBACTAM 2.25 G: 2; .25 INJECTION, POWDER, FOR SOLUTION INTRAVENOUS at 13:48

## 2024-01-01 RX ADMIN — Medication 25 MG: at 21:06

## 2024-01-01 RX ADMIN — PIPERACILLIN AND TAZOBACTAM 4.5 G: 4; .5 INJECTION, POWDER, FOR SOLUTION INTRAVENOUS at 08:22

## 2024-01-01 RX ADMIN — PROPOFOL 50 MG: 10 INJECTION, EMULSION INTRAVENOUS at 22:28

## 2024-01-01 RX ADMIN — Medication 1 PACKET: at 14:10

## 2024-01-01 RX ADMIN — PREDNISONE 40 MG: 20 TABLET ORAL at 08:58

## 2024-01-01 RX ADMIN — MIDODRINE HYDROCHLORIDE 15 MG: 5 TABLET ORAL at 21:28

## 2024-01-01 RX ADMIN — QUETIAPINE FUMARATE 25 MG: 25 TABLET ORAL at 21:00

## 2024-01-01 RX ADMIN — RAMELTEON 8 MG: 8 TABLET, FILM COATED ORAL at 21:27

## 2024-01-01 RX ADMIN — FENTANYL CITRATE 50 MCG: 50 INJECTION INTRAMUSCULAR; INTRAVENOUS at 22:58

## 2024-01-01 RX ADMIN — Medication 0.5 MCG/KG/MIN: at 17:49

## 2024-01-01 RX ADMIN — LEVALBUTEROL HYDROCHLORIDE 0.63 MG: 0.63 SOLUTION RESPIRATORY (INHALATION) at 19:42

## 2024-01-01 RX ADMIN — LORAZEPAM 1 MG: 2 LIQUID ORAL at 08:30

## 2024-01-01 RX ADMIN — METOPROLOL TARTRATE 25 MG: 25 TABLET, FILM COATED ORAL at 19:53

## 2024-01-01 RX ADMIN — QUETIAPINE FUMARATE 25 MG: 25 TABLET ORAL at 10:08

## 2024-01-01 RX ADMIN — INSULIN ASPART 1 UNITS: 100 INJECTION, SOLUTION INTRAVENOUS; SUBCUTANEOUS at 08:33

## 2024-01-01 RX ADMIN — MINOCYCLINE HYDROCHLORIDE 100 MG: 100 INJECTION INTRAVENOUS at 01:00

## 2024-01-01 RX ADMIN — HEPARIN SODIUM 5000 UNITS: 5000 INJECTION, SOLUTION INTRAVENOUS; SUBCUTANEOUS at 06:49

## 2024-01-01 RX ADMIN — LEVALBUTEROL HYDROCHLORIDE 0.63 MG: 0.63 SOLUTION RESPIRATORY (INHALATION) at 11:29

## 2024-01-01 RX ADMIN — CHLORHEXIDINE GLUCONATE 0.12% ORAL RINSE 15 ML: 1.2 LIQUID ORAL at 07:48

## 2024-01-01 RX ADMIN — INSULIN HUMAN 8 UNITS: 100 INJECTION, SUSPENSION SUBCUTANEOUS at 08:05

## 2024-01-01 RX ADMIN — INSULIN GLARGINE 5 UNITS: 100 INJECTION, SOLUTION SUBCUTANEOUS at 06:37

## 2024-01-01 RX ADMIN — AZITHROMYCIN DIHYDRATE 250 MG: 250 TABLET ORAL at 08:06

## 2024-01-01 RX ADMIN — HEPARIN SODIUM 5000 UNITS: 5000 INJECTION, SOLUTION INTRAVENOUS; SUBCUTANEOUS at 13:07

## 2024-01-01 RX ADMIN — MINOCYCLINE HYDROCHLORIDE 100 MG: 100 INJECTION INTRAVENOUS at 23:09

## 2024-01-01 RX ADMIN — ACETYLCYSTEINE 4 ML: 100 SOLUTION ORAL; RESPIRATORY (INHALATION) at 16:20

## 2024-01-01 RX ADMIN — MIDODRINE HYDROCHLORIDE 15 MG: 5 TABLET ORAL at 05:56

## 2024-01-01 RX ADMIN — LORAZEPAM 0.5 MG: 2 LIQUID ORAL at 06:08

## 2024-01-01 RX ADMIN — DEXTROSE MONOHYDRATE 1000 ML: 100 INJECTION, SOLUTION INTRAVENOUS at 00:09

## 2024-01-01 RX ADMIN — QUETIAPINE FUMARATE 50 MG: 50 TABLET ORAL at 08:24

## 2024-01-01 RX ADMIN — Medication 1 MG: at 08:19

## 2024-01-01 RX ADMIN — TACROLIMUS 0.8 MG: 5 CAPSULE ORAL at 18:04

## 2024-01-01 RX ADMIN — CHLORHEXIDINE GLUCONATE 0.12% ORAL RINSE 15 ML: 1.2 LIQUID ORAL at 08:45

## 2024-01-01 RX ADMIN — FENTANYL CITRATE 50 MCG: 50 INJECTION INTRAMUSCULAR; INTRAVENOUS at 13:52

## 2024-01-01 RX ADMIN — HEPARIN SODIUM 5000 UNITS: 5000 INJECTION, SOLUTION INTRAVENOUS; SUBCUTANEOUS at 13:09

## 2024-01-01 RX ADMIN — Medication 1 PACKET: at 16:23

## 2024-01-01 RX ADMIN — ACETYLCYSTEINE 4 ML: 100 SOLUTION ORAL; RESPIRATORY (INHALATION) at 08:41

## 2024-01-01 RX ADMIN — EPHEDRINE SULFATE 5 MG: 5 INJECTION INTRAVENOUS at 12:10

## 2024-01-01 RX ADMIN — TACROLIMUS 1 MG: 5 CAPSULE ORAL at 17:46

## 2024-01-01 RX ADMIN — CALCIUM CHLORIDE, MAGNESIUM CHLORIDE, SODIUM CHLORIDE, SODIUM BICARBONATE, POTASSIUM CHLORIDE AND SODIUM PHOSPHATE DIBASIC DIHYDRATE 12.5 ML/KG/HR: 3.68; 3.05; 6.34; 3.09; .314; .187 INJECTION INTRAVENOUS at 12:37

## 2024-01-01 RX ADMIN — CALCIUM CHLORIDE, MAGNESIUM CHLORIDE, SODIUM CHLORIDE, SODIUM BICARBONATE, POTASSIUM CHLORIDE AND SODIUM PHOSPHATE DIBASIC DIHYDRATE 12.5 ML/KG/HR: 3.68; 3.05; 6.34; 3.09; .314; .187 INJECTION INTRAVENOUS at 15:28

## 2024-01-01 RX ADMIN — LORAZEPAM 0.5 MG: 2 LIQUID ORAL at 12:05

## 2024-01-01 RX ADMIN — POSACONAZOLE 300 MG: 100 TABLET, DELAYED RELEASE ORAL at 14:56

## 2024-01-01 RX ADMIN — MIDODRINE HYDROCHLORIDE 20 MG: 5 TABLET ORAL at 14:21

## 2024-01-01 RX ADMIN — ACETAMINOPHEN 325 MG: 325 TABLET, FILM COATED ORAL at 05:53

## 2024-01-01 RX ADMIN — INSULIN ASPART 2 UNITS: 100 INJECTION, SOLUTION INTRAVENOUS; SUBCUTANEOUS at 04:18

## 2024-01-01 RX ADMIN — ACETYLCYSTEINE 4 ML: 100 SOLUTION ORAL; RESPIRATORY (INHALATION) at 15:20

## 2024-01-01 RX ADMIN — INSULIN ASPART 2 UNITS: 100 INJECTION, SOLUTION INTRAVENOUS; SUBCUTANEOUS at 16:26

## 2024-01-01 RX ADMIN — ACETAMINOPHEN 325 MG: 325 TABLET, FILM COATED ORAL at 06:06

## 2024-01-01 RX ADMIN — ACETAMINOPHEN 325 MG: 325 TABLET, FILM COATED ORAL at 14:21

## 2024-01-01 RX ADMIN — MIDAZOLAM 2 MG: 1 INJECTION INTRAMUSCULAR; INTRAVENOUS at 14:22

## 2024-01-01 RX ADMIN — LINEZOLID 600 MG: 600 TABLET, FILM COATED ORAL at 12:04

## 2024-01-01 RX ADMIN — MONTELUKAST 10 MG: 10 TABLET, FILM COATED ORAL at 21:09

## 2024-01-01 RX ADMIN — LIDOCAINE HYDROCHLORIDE 5 ML: 10 INJECTION, SOLUTION EPIDURAL; INFILTRATION; INTRACAUDAL; PERINEURAL at 14:21

## 2024-01-01 RX ADMIN — SODIUM CHLORIDE SOLN NEBU 3% 3 ML: 3 NEBU SOLN at 12:32

## 2024-01-01 RX ADMIN — Medication 40 MG: at 12:11

## 2024-01-01 RX ADMIN — DEXMEDETOMIDINE HYDROCHLORIDE 0.3 MCG/KG/HR: 4 INJECTION, SOLUTION INTRAVENOUS at 10:40

## 2024-01-01 RX ADMIN — LEVALBUTEROL HYDROCHLORIDE 0.63 MG: 0.63 SOLUTION RESPIRATORY (INHALATION) at 15:31

## 2024-01-01 RX ADMIN — INSULIN HUMAN 8 UNITS: 100 INJECTION, SUSPENSION SUBCUTANEOUS at 07:40

## 2024-01-01 RX ADMIN — SODIUM CHLORIDE SOLN NEBU 3% 3 ML: 3 NEBU SOLN at 19:57

## 2024-01-01 RX ADMIN — HEPARIN SODIUM 5000 UNITS: 5000 INJECTION, SOLUTION INTRAVENOUS; SUBCUTANEOUS at 13:32

## 2024-01-01 RX ADMIN — ACETAMINOPHEN 325 MG: 325 TABLET, FILM COATED ORAL at 17:49

## 2024-01-01 RX ADMIN — INSULIN ASPART 1 UNITS: 100 INJECTION, SOLUTION INTRAVENOUS; SUBCUTANEOUS at 09:19

## 2024-01-01 RX ADMIN — FENTANYL CITRATE 25 MCG: 50 INJECTION, SOLUTION INTRAMUSCULAR; INTRAVENOUS at 11:01

## 2024-01-01 RX ADMIN — SODIUM CHLORIDE SOLN NEBU 3% 3 ML: 3 NEBU SOLN at 20:49

## 2024-01-01 RX ADMIN — LEVALBUTEROL HYDROCHLORIDE 0.63 MG: 0.63 SOLUTION RESPIRATORY (INHALATION) at 11:46

## 2024-01-01 RX ADMIN — Medication 40 MG: at 21:18

## 2024-01-01 RX ADMIN — ESCITALOPRAM 10 MG: 5 SOLUTION ORAL at 08:49

## 2024-01-01 RX ADMIN — LINEZOLID 600 MG: 600 TABLET, FILM COATED ORAL at 12:20

## 2024-01-01 RX ADMIN — METOPROLOL TARTRATE 25 MG: 25 TABLET, FILM COATED ORAL at 18:08

## 2024-01-01 RX ADMIN — AZITHROMYCIN DIHYDRATE 250 MG: 250 TABLET ORAL at 07:40

## 2024-01-01 RX ADMIN — ACETYLCYSTEINE 4 ML: 100 SOLUTION ORAL; RESPIRATORY (INHALATION) at 15:33

## 2024-01-01 RX ADMIN — Medication 40 MG: at 20:37

## 2024-01-01 RX ADMIN — MIDODRINE HYDROCHLORIDE 15 MG: 5 TABLET ORAL at 05:55

## 2024-01-01 RX ADMIN — TACROLIMUS 1.5 MG: 5 CAPSULE ORAL at 18:13

## 2024-01-01 RX ADMIN — CALCIUM CHLORIDE, MAGNESIUM CHLORIDE, SODIUM CHLORIDE, SODIUM BICARBONATE, POTASSIUM CHLORIDE AND SODIUM PHOSPHATE DIBASIC DIHYDRATE 12.5 ML/KG/HR: 3.68; 3.05; 6.34; 3.09; .314; .187 INJECTION INTRAVENOUS at 22:21

## 2024-01-01 RX ADMIN — PIPERACILLIN AND TAZOBACTAM 4.5 G: 4; .5 INJECTION, POWDER, FOR SOLUTION INTRAVENOUS at 01:25

## 2024-01-01 RX ADMIN — QUETIAPINE FUMARATE 150 MG: 50 TABLET ORAL at 21:02

## 2024-01-01 RX ADMIN — SODIUM CHLORIDE: 9 INJECTION, SOLUTION INTRAVENOUS at 09:29

## 2024-01-01 RX ADMIN — Medication 50 MCG: at 23:18

## 2024-01-01 RX ADMIN — HEPARIN SODIUM 5000 UNITS: 5000 INJECTION, SOLUTION INTRAVENOUS; SUBCUTANEOUS at 13:58

## 2024-01-01 RX ADMIN — MONTELUKAST 10 MG: 10 TABLET, FILM COATED ORAL at 00:28

## 2024-01-01 RX ADMIN — SODIUM CHLORIDE 250 ML: 9 INJECTION, SOLUTION INTRAVENOUS at 16:45

## 2024-01-01 RX ADMIN — LEVALBUTEROL HYDROCHLORIDE 0.63 MG: 0.63 SOLUTION RESPIRATORY (INHALATION) at 19:52

## 2024-01-01 RX ADMIN — METOPROLOL TARTRATE 25 MG: 25 TABLET, FILM COATED ORAL at 07:38

## 2024-01-01 RX ADMIN — LEVALBUTEROL HYDROCHLORIDE 0.63 MG: 0.63 SOLUTION RESPIRATORY (INHALATION) at 12:36

## 2024-01-01 RX ADMIN — Medication 1 PACKET: at 15:48

## 2024-01-01 RX ADMIN — CHLORHEXIDINE GLUCONATE 0.12% ORAL RINSE 15 ML: 1.2 LIQUID ORAL at 08:46

## 2024-01-01 RX ADMIN — SODIUM CHLORIDE SOLN NEBU 3% 3 ML: 3 NEBU SOLN at 19:46

## 2024-01-01 RX ADMIN — Medication 40 MG: at 12:44

## 2024-01-01 RX ADMIN — Medication 1 PACKET: at 08:56

## 2024-01-01 RX ADMIN — Medication 1 PACKET: at 08:14

## 2024-01-01 RX ADMIN — LIDOCAINE HYDROCHLORIDE 0.5 ML: 10 INJECTION, SOLUTION EPIDURAL; INFILTRATION; INTRACAUDAL; PERINEURAL at 11:30

## 2024-01-01 RX ADMIN — LEVALBUTEROL HYDROCHLORIDE 0.63 MG: 0.63 SOLUTION RESPIRATORY (INHALATION) at 13:38

## 2024-01-01 RX ADMIN — METOPROLOL TARTRATE 25 MG: 25 TABLET, FILM COATED ORAL at 18:19

## 2024-01-01 RX ADMIN — PANCRELIPASE 1 CAPSULE: 60000; 12000; 38000 CAPSULE, DELAYED RELEASE PELLETS ORAL at 13:48

## 2024-01-01 RX ADMIN — POSACONAZOLE 300 MG: 100 TABLET, DELAYED RELEASE ORAL at 13:59

## 2024-01-01 RX ADMIN — MONTELUKAST 10 MG: 10 TABLET, FILM COATED ORAL at 21:12

## 2024-01-01 RX ADMIN — LORAZEPAM 1 MG: 2 LIQUID ORAL at 18:04

## 2024-01-01 RX ADMIN — PHENYLEPHRINE HYDROCHLORIDE 100 MCG: 10 INJECTION INTRAVENOUS at 14:04

## 2024-01-01 RX ADMIN — CHLORHEXIDINE GLUCONATE 0.12% ORAL RINSE 15 ML: 1.2 LIQUID ORAL at 07:28

## 2024-01-01 RX ADMIN — PIPERACILLIN AND TAZOBACTAM 2.25 G: 2; .25 INJECTION, POWDER, FOR SOLUTION INTRAVENOUS at 17:52

## 2024-01-01 RX ADMIN — HYDROMORPHONE HYDROCHLORIDE 0.3 MG: 1 INJECTION, SOLUTION INTRAMUSCULAR; INTRAVENOUS; SUBCUTANEOUS at 14:34

## 2024-01-01 RX ADMIN — Medication 40 MG: at 20:39

## 2024-01-01 RX ADMIN — METOPROLOL TARTRATE 25 MG: 25 TABLET, FILM COATED ORAL at 21:08

## 2024-01-01 RX ADMIN — SODIUM CHLORIDE SOLN NEBU 3% 3 ML: 3 NEBU SOLN at 20:46

## 2024-01-01 RX ADMIN — MIDODRINE HYDROCHLORIDE 15 MG: 5 TABLET ORAL at 14:10

## 2024-01-01 RX ADMIN — ACETYLCYSTEINE 4 ML: 100 SOLUTION ORAL; RESPIRATORY (INHALATION) at 17:18

## 2024-01-01 RX ADMIN — LINEZOLID 600 MG: 600 TABLET, FILM COATED ORAL at 23:31

## 2024-01-01 RX ADMIN — CARVEDILOL 6.25 MG: 6.25 TABLET, FILM COATED ORAL at 08:20

## 2024-01-01 RX ADMIN — HEPARIN SODIUM 5000 UNITS: 5000 INJECTION, SOLUTION INTRAVENOUS; SUBCUTANEOUS at 13:28

## 2024-01-01 RX ADMIN — MIDODRINE HYDROCHLORIDE 10 MG: 5 TABLET ORAL at 05:12

## 2024-01-01 RX ADMIN — SODIUM CHLORIDE SOLN NEBU 3% 3 ML: 3 NEBU SOLN at 12:02

## 2024-01-01 RX ADMIN — MAGNESIUM SULFATE HEPTAHYDRATE 2 G: 2 INJECTION, SOLUTION INTRAVENOUS at 16:53

## 2024-01-01 RX ADMIN — CALCIUM CHLORIDE, MAGNESIUM CHLORIDE, SODIUM CHLORIDE, SODIUM BICARBONATE, POTASSIUM CHLORIDE AND SODIUM PHOSPHATE DIBASIC DIHYDRATE 12.5 ML/KG/HR: 3.68; 3.05; 6.34; 3.09; .314; .187 INJECTION INTRAVENOUS at 08:09

## 2024-01-01 RX ADMIN — HEPARIN SODIUM 5000 UNITS: 5000 INJECTION, SOLUTION INTRAVENOUS; SUBCUTANEOUS at 15:00

## 2024-01-01 RX ADMIN — MINOCYCLINE HYDROCHLORIDE 100 MG: 100 INJECTION INTRAVENOUS at 11:11

## 2024-01-01 RX ADMIN — METOPROLOL TARTRATE 25 MG: 25 TABLET, FILM COATED ORAL at 08:10

## 2024-01-01 RX ADMIN — MIDODRINE HYDROCHLORIDE 5 MG: 5 TABLET ORAL at 23:21

## 2024-01-01 RX ADMIN — INSULIN ASPART 2 UNITS: 100 INJECTION, SOLUTION INTRAVENOUS; SUBCUTANEOUS at 23:14

## 2024-01-01 RX ADMIN — Medication 1 TABLET: at 08:56

## 2024-01-01 RX ADMIN — Medication 1 PACKET: at 07:54

## 2024-01-01 RX ADMIN — POSACONAZOLE 300 MG: 100 TABLET, DELAYED RELEASE ORAL at 11:39

## 2024-01-01 RX ADMIN — HYDROCORTISONE SODIUM SUCCINATE 50 MG: 100 INJECTION, POWDER, FOR SOLUTION INTRAMUSCULAR; INTRAVENOUS at 04:17

## 2024-01-01 RX ADMIN — CEFTAZIDIME 2 G: 2 INJECTION, POWDER, FOR SOLUTION INTRAVENOUS at 21:29

## 2024-01-01 RX ADMIN — LORAZEPAM 1 MG: 1 TABLET ORAL at 19:12

## 2024-01-01 RX ADMIN — Medication 50 MCG: at 08:45

## 2024-01-01 RX ADMIN — SULFAMETHOXAZOLE AND TRIMETHOPRIM 1 TABLET: 400; 80 TABLET ORAL at 19:51

## 2024-01-01 RX ADMIN — QUETIAPINE FUMARATE 25 MG: 25 TABLET ORAL at 05:50

## 2024-01-01 RX ADMIN — CHLORHEXIDINE GLUCONATE 0.12% ORAL RINSE 15 ML: 1.2 LIQUID ORAL at 09:10

## 2024-01-01 RX ADMIN — LORAZEPAM 1 MG: 2 LIQUID ORAL at 10:12

## 2024-01-01 RX ADMIN — SULFAMETHOXAZOLE AND TRIMETHOPRIM 1 TABLET: 400; 80 TABLET ORAL at 20:06

## 2024-01-01 RX ADMIN — TACROLIMUS 0.4 MG: 5 CAPSULE ORAL at 08:24

## 2024-01-01 RX ADMIN — PROCHLORPERAZINE EDISYLATE 10 MG: 5 INJECTION INTRAMUSCULAR; INTRAVENOUS at 09:09

## 2024-01-01 RX ADMIN — ACETAMINOPHEN 650 MG: 325 TABLET, FILM COATED ORAL at 09:08

## 2024-01-01 RX ADMIN — RAMELTEON 8 MG: 8 TABLET ORAL at 21:06

## 2024-01-01 RX ADMIN — CHLORHEXIDINE GLUCONATE 0.12% ORAL RINSE 15 ML: 1.2 LIQUID ORAL at 20:41

## 2024-01-01 RX ADMIN — SODIUM CHLORIDE SOLN NEBU 3% 3 ML: 3 NEBU SOLN at 21:00

## 2024-01-01 RX ADMIN — LORAZEPAM 1 MG: 2 LIQUID ORAL at 00:23

## 2024-01-01 RX ADMIN — INSULIN ASPART 2 UNITS: 100 INJECTION, SOLUTION INTRAVENOUS; SUBCUTANEOUS at 20:42

## 2024-01-01 RX ADMIN — POSACONAZOLE 300 MG: 100 TABLET, DELAYED RELEASE ORAL at 14:52

## 2024-01-01 RX ADMIN — CARVEDILOL 6.25 MG: 6.25 TABLET, FILM COATED ORAL at 20:48

## 2024-01-01 RX ADMIN — LINEZOLID 600 MG: 600 TABLET, FILM COATED ORAL at 23:01

## 2024-01-01 RX ADMIN — QUETIAPINE FUMARATE 50 MG: 50 TABLET ORAL at 21:09

## 2024-01-01 RX ADMIN — LEVALBUTEROL HYDROCHLORIDE 0.63 MG: 0.63 SOLUTION RESPIRATORY (INHALATION) at 11:47

## 2024-01-01 RX ADMIN — MEROPENEM 1 G: 1 INJECTION, POWDER, FOR SOLUTION INTRAVENOUS at 17:15

## 2024-01-01 RX ADMIN — SULFAMETHOXAZOLE AND TRIMETHOPRIM 80 MG: 200; 40 SUSPENSION ORAL at 21:33

## 2024-01-01 RX ADMIN — METOPROLOL TARTRATE 25 MG: 25 TABLET, FILM COATED ORAL at 07:40

## 2024-01-01 RX ADMIN — RAMELTEON 8 MG: 8 TABLET, FILM COATED ORAL at 20:20

## 2024-01-01 RX ADMIN — INSULIN ASPART 2 UNITS: 100 INJECTION, SOLUTION INTRAVENOUS; SUBCUTANEOUS at 12:20

## 2024-01-01 RX ADMIN — SODIUM CHLORIDE SOLN NEBU 3% 3 ML: 3 NEBU SOLN at 11:20

## 2024-01-01 RX ADMIN — Medication 25 MG: at 21:12

## 2024-01-01 RX ADMIN — CHLORHEXIDINE GLUCONATE 0.12% ORAL RINSE 15 ML: 1.2 LIQUID ORAL at 07:30

## 2024-01-01 RX ADMIN — Medication 5 ML: at 08:47

## 2024-01-01 RX ADMIN — HEPARIN SODIUM 5000 UNITS: 5000 INJECTION, SOLUTION INTRAVENOUS; SUBCUTANEOUS at 04:34

## 2024-01-01 RX ADMIN — CARVEDILOL 6.25 MG: 6.25 TABLET, FILM COATED ORAL at 08:47

## 2024-01-01 RX ADMIN — CHLORHEXIDINE GLUCONATE 0.12% ORAL RINSE 15 ML: 1.2 LIQUID ORAL at 19:55

## 2024-01-01 RX ADMIN — LORAZEPAM 1 MG: 2 INJECTION INTRAMUSCULAR; INTRAVENOUS at 13:50

## 2024-01-01 RX ADMIN — LORAZEPAM 0.5 MG: 2 LIQUID ORAL at 06:15

## 2024-01-01 RX ADMIN — QUETIAPINE FUMARATE 50 MG: 50 TABLET ORAL at 20:10

## 2024-01-01 RX ADMIN — CALCIUM CHLORIDE, MAGNESIUM CHLORIDE, SODIUM CHLORIDE, SODIUM BICARBONATE, POTASSIUM CHLORIDE AND SODIUM PHOSPHATE DIBASIC DIHYDRATE 12.5 ML/KG/HR: 3.68; 3.05; 6.34; 3.09; .314; .187 INJECTION INTRAVENOUS at 03:11

## 2024-01-01 RX ADMIN — LEVALBUTEROL HYDROCHLORIDE 0.63 MG: 0.63 SOLUTION RESPIRATORY (INHALATION) at 12:06

## 2024-01-01 RX ADMIN — CARVEDILOL 6.25 MG: 6.25 TABLET, FILM COATED ORAL at 11:10

## 2024-01-01 RX ADMIN — METOPROLOL TARTRATE 25 MG: 25 TABLET, FILM COATED ORAL at 08:03

## 2024-01-01 RX ADMIN — INSULIN ASPART 1 UNITS: 100 INJECTION, SOLUTION INTRAVENOUS; SUBCUTANEOUS at 15:43

## 2024-01-01 RX ADMIN — MONTELUKAST 10 MG: 10 TABLET, FILM COATED ORAL at 21:06

## 2024-01-01 RX ADMIN — TRAZODONE HYDROCHLORIDE 25 MG: 50 TABLET ORAL at 20:56

## 2024-01-01 RX ADMIN — ACETYLCYSTEINE 4 ML: 100 SOLUTION ORAL; RESPIRATORY (INHALATION) at 17:13

## 2024-01-01 RX ADMIN — LIDOCAINE HYDROCHLORIDE 40 MG: 20 INJECTION, SOLUTION INFILTRATION; PERINEURAL at 08:32

## 2024-01-01 RX ADMIN — SODIUM CHLORIDE SOLN NEBU 3% 3 ML: 3 NEBU SOLN at 20:40

## 2024-01-01 RX ADMIN — METOPROLOL TARTRATE 25 MG: 25 TABLET, FILM COATED ORAL at 18:16

## 2024-01-01 RX ADMIN — HEPARIN SODIUM 500 UNITS: 1000 INJECTION INTRAVENOUS; SUBCUTANEOUS at 15:02

## 2024-01-01 RX ADMIN — MICAFUNGIN SODIUM 150 MG: 50 INJECTION, POWDER, LYOPHILIZED, FOR SOLUTION INTRAVENOUS at 02:01

## 2024-01-01 RX ADMIN — LINEZOLID 600 MG: 600 TABLET, FILM COATED ORAL at 11:11

## 2024-01-01 RX ADMIN — QUETIAPINE FUMARATE 50 MG: 50 TABLET ORAL at 23:10

## 2024-01-01 RX ADMIN — LEVALBUTEROL HYDROCHLORIDE 0.63 MG: 0.63 SOLUTION RESPIRATORY (INHALATION) at 22:17

## 2024-01-01 RX ADMIN — OXYCODONE HYDROCHLORIDE 5 MG: 5 TABLET ORAL at 00:04

## 2024-01-01 RX ADMIN — POSACONAZOLE 300 MG: 100 TABLET, DELAYED RELEASE ORAL at 11:57

## 2024-01-01 RX ADMIN — ACETYLCYSTEINE 4 ML: 100 SOLUTION ORAL; RESPIRATORY (INHALATION) at 15:47

## 2024-01-01 RX ADMIN — LEVALBUTEROL HYDROCHLORIDE 0.63 MG: 0.63 SOLUTION RESPIRATORY (INHALATION) at 11:55

## 2024-01-01 RX ADMIN — Medication 5 ML: at 08:25

## 2024-01-01 RX ADMIN — TACROLIMUS 1 MG: 5 CAPSULE ORAL at 08:38

## 2024-01-01 RX ADMIN — CEFTAZIDIME 2 G: 2 INJECTION, POWDER, FOR SOLUTION INTRAVENOUS at 14:03

## 2024-01-01 RX ADMIN — LORAZEPAM 1 MG: 2 LIQUID ORAL at 09:07

## 2024-01-01 RX ADMIN — METOPROLOL TARTRATE 25 MG: 25 TABLET, FILM COATED ORAL at 07:59

## 2024-01-01 RX ADMIN — DEXMEDETOMIDINE HYDROCHLORIDE 1 MCG/KG/HR: 4 INJECTION, SOLUTION INTRAVENOUS at 01:26

## 2024-01-01 RX ADMIN — PREDNISONE 50 MG: 50 TABLET ORAL at 07:39

## 2024-01-01 RX ADMIN — INSULIN HUMAN 2.5 UNITS/HR: 1 INJECTION, SOLUTION INTRAVENOUS at 10:52

## 2024-01-01 RX ADMIN — QUETIAPINE FUMARATE 25 MG: 25 TABLET ORAL at 21:06

## 2024-01-01 RX ADMIN — MIDAZOLAM 0.5 MG: 1 INJECTION INTRAMUSCULAR; INTRAVENOUS at 12:41

## 2024-01-01 RX ADMIN — LEVALBUTEROL HYDROCHLORIDE 0.63 MG: 0.63 SOLUTION RESPIRATORY (INHALATION) at 07:29

## 2024-01-01 RX ADMIN — MONTELUKAST 10 MG: 10 TABLET, FILM COATED ORAL at 22:10

## 2024-01-01 RX ADMIN — VALGANCICLOVIR HYDROCHLORIDE 450 MG: 50 POWDER, FOR SOLUTION ORAL at 20:35

## 2024-01-01 RX ADMIN — OXYCODONE HYDROCHLORIDE 5 MG: 5 SOLUTION ORAL at 12:46

## 2024-01-01 RX ADMIN — ACETAMINOPHEN 975 MG: 325 TABLET, FILM COATED ORAL at 06:15

## 2024-01-01 RX ADMIN — CALCIUM CHLORIDE, MAGNESIUM CHLORIDE, SODIUM CHLORIDE, SODIUM BICARBONATE, POTASSIUM CHLORIDE AND SODIUM PHOSPHATE DIBASIC DIHYDRATE 12.5 ML/KG/HR: 3.68; 3.05; 6.34; 3.09; .314; .187 INJECTION INTRAVENOUS at 04:22

## 2024-01-01 RX ADMIN — HEPARIN SODIUM 5000 UNITS: 5000 INJECTION, SOLUTION INTRAVENOUS; SUBCUTANEOUS at 08:45

## 2024-01-01 RX ADMIN — MIDAZOLAM 1 MG: 1 INJECTION INTRAMUSCULAR; INTRAVENOUS at 16:50

## 2024-01-01 RX ADMIN — SODIUM CHLORIDE SOLN NEBU 3% 3 ML: 3 NEBU SOLN at 11:48

## 2024-01-01 RX ADMIN — TACROLIMUS 0.8 MG: 5 CAPSULE ORAL at 08:12

## 2024-01-01 RX ADMIN — CALCIUM CHLORIDE, MAGNESIUM CHLORIDE, SODIUM CHLORIDE, SODIUM BICARBONATE, POTASSIUM CHLORIDE AND SODIUM PHOSPHATE DIBASIC DIHYDRATE 12.5 ML/KG/HR: 3.68; 3.05; 6.34; 3.09; .314; .187 INJECTION INTRAVENOUS at 20:58

## 2024-01-01 RX ADMIN — Medication 5 ML: at 08:53

## 2024-01-01 RX ADMIN — CHLORHEXIDINE GLUCONATE 0.12% ORAL RINSE 15 ML: 1.2 LIQUID ORAL at 08:05

## 2024-01-01 RX ADMIN — TRAZODONE HYDROCHLORIDE 50 MG: 50 TABLET ORAL at 20:21

## 2024-01-01 RX ADMIN — AZITHROMYCIN DIHYDRATE 250 MG: 250 TABLET ORAL at 08:46

## 2024-01-01 RX ADMIN — LEVALBUTEROL HYDROCHLORIDE 0.63 MG: 0.63 SOLUTION RESPIRATORY (INHALATION) at 17:18

## 2024-01-01 RX ADMIN — LEVALBUTEROL HYDROCHLORIDE 0.63 MG: 0.63 SOLUTION RESPIRATORY (INHALATION) at 15:43

## 2024-01-01 RX ADMIN — INSULIN ASPART 1 UNITS: 100 INJECTION, SOLUTION INTRAVENOUS; SUBCUTANEOUS at 08:48

## 2024-01-01 RX ADMIN — Medication 1 MG: at 08:28

## 2024-01-01 RX ADMIN — Medication 1 PACKET: at 07:49

## 2024-01-01 RX ADMIN — ACETYLCYSTEINE 2 ML: 200 SOLUTION ORAL; RESPIRATORY (INHALATION) at 07:51

## 2024-01-01 RX ADMIN — DIPHENHYDRAMINE HYDROCHLORIDE 25 MG: 50 INJECTION INTRAMUSCULAR; INTRAVENOUS at 19:32

## 2024-01-01 RX ADMIN — SULFAMETHOXAZOLE AND TRIMETHOPRIM 1 TABLET: 400; 80 TABLET ORAL at 20:03

## 2024-01-01 RX ADMIN — Medication 1 PACKET: at 08:20

## 2024-01-01 RX ADMIN — HEPARIN SODIUM 500 UNITS/HR: 1000 INJECTION INTRAVENOUS; SUBCUTANEOUS at 01:26

## 2024-01-01 RX ADMIN — SODIUM CHLORIDE 250 ML: 9 INJECTION, SOLUTION INTRAVENOUS at 15:02

## 2024-01-01 RX ADMIN — Medication 5 ML: at 08:22

## 2024-01-01 RX ADMIN — CALCIUM CHLORIDE, MAGNESIUM CHLORIDE, SODIUM CHLORIDE, SODIUM BICARBONATE, POTASSIUM CHLORIDE AND SODIUM PHOSPHATE DIBASIC DIHYDRATE: 3.68; 3.05; 6.34; 3.09; .314; .187 INJECTION INTRAVENOUS at 00:42

## 2024-01-01 RX ADMIN — QUETIAPINE FUMARATE 25 MG: 25 TABLET ORAL at 21:45

## 2024-01-01 RX ADMIN — SODIUM CHLORIDE SOLN NEBU 3% 3 ML: 3 NEBU SOLN at 12:25

## 2024-01-01 RX ADMIN — Medication 1 MG: at 18:33

## 2024-01-01 RX ADMIN — MIDODRINE HYDROCHLORIDE 10 MG: 5 TABLET ORAL at 13:47

## 2024-01-01 RX ADMIN — ACETAMINOPHEN 325 MG: 325 TABLET, FILM COATED ORAL at 07:59

## 2024-01-01 RX ADMIN — ACETYLCYSTEINE 4 ML: 100 SOLUTION ORAL; RESPIRATORY (INHALATION) at 09:18

## 2024-01-01 RX ADMIN — LORAZEPAM 0.5 MG: 2 LIQUID ORAL at 13:01

## 2024-01-01 RX ADMIN — AZITHROMYCIN MONOHYDRATE 500 MG: 500 INJECTION, POWDER, LYOPHILIZED, FOR SOLUTION INTRAVENOUS at 18:11

## 2024-01-01 RX ADMIN — INSULIN HUMAN 8 UNITS: 100 INJECTION, SUSPENSION SUBCUTANEOUS at 20:15

## 2024-01-01 RX ADMIN — HYDROCORTISONE SODIUM SUCCINATE 25 MG: 100 INJECTION, POWDER, FOR SOLUTION INTRAMUSCULAR; INTRAVENOUS at 08:07

## 2024-01-01 RX ADMIN — LEVALBUTEROL HYDROCHLORIDE 0.63 MG: 0.63 SOLUTION RESPIRATORY (INHALATION) at 20:57

## 2024-01-01 RX ADMIN — LEVALBUTEROL HYDROCHLORIDE 0.63 MG: 0.63 SOLUTION RESPIRATORY (INHALATION) at 08:21

## 2024-01-01 RX ADMIN — INSULIN ASPART 1 UNITS: 100 INJECTION, SOLUTION INTRAVENOUS; SUBCUTANEOUS at 21:11

## 2024-01-01 RX ADMIN — CALCIUM CHLORIDE, MAGNESIUM CHLORIDE, SODIUM CHLORIDE, SODIUM BICARBONATE, POTASSIUM CHLORIDE AND SODIUM PHOSPHATE DIBASIC DIHYDRATE: 3.68; 3.05; 6.34; 3.09; .314; .187 INJECTION INTRAVENOUS at 16:33

## 2024-01-01 RX ADMIN — ACETYLCYSTEINE 2 ML: 200 SOLUTION ORAL; RESPIRATORY (INHALATION) at 08:37

## 2024-01-01 RX ADMIN — SODIUM CHLORIDE SOLN NEBU 3% 3 ML: 3 NEBU SOLN at 22:17

## 2024-01-01 RX ADMIN — HEPARIN SODIUM 500 UNITS/HR: 1000 INJECTION INTRAVENOUS; SUBCUTANEOUS at 20:26

## 2024-01-01 RX ADMIN — Medication 1 PACKET: at 08:40

## 2024-01-01 RX ADMIN — CALCIUM CHLORIDE, MAGNESIUM CHLORIDE, SODIUM CHLORIDE, SODIUM BICARBONATE, POTASSIUM CHLORIDE AND SODIUM PHOSPHATE DIBASIC DIHYDRATE 12.5 ML/KG/HR: 3.68; 3.05; 6.34; 3.09; .314; .187 INJECTION INTRAVENOUS at 17:56

## 2024-01-01 RX ADMIN — AZITHROMYCIN 125 MG: 200 POWDER, FOR SUSPENSION PARENTERAL at 08:57

## 2024-01-01 RX ADMIN — INSULIN ASPART 2 UNITS: 100 INJECTION, SOLUTION INTRAVENOUS; SUBCUTANEOUS at 19:52

## 2024-01-01 RX ADMIN — MINOCYCLINE HYDROCHLORIDE 100 MG: 100 INJECTION INTRAVENOUS at 14:23

## 2024-01-01 RX ADMIN — LEVALBUTEROL HYDROCHLORIDE 0.63 MG: 0.63 SOLUTION RESPIRATORY (INHALATION) at 08:33

## 2024-01-01 RX ADMIN — HYDROCORTISONE SODIUM SUCCINATE 50 MG: 100 INJECTION, POWDER, FOR SOLUTION INTRAMUSCULAR; INTRAVENOUS at 22:07

## 2024-01-01 RX ADMIN — SODIUM CHLORIDE SOLN NEBU 3% 3 ML: 3 NEBU SOLN at 11:37

## 2024-01-01 RX ADMIN — POSACONAZOLE 300 MG: 100 TABLET, DELAYED RELEASE ORAL at 11:11

## 2024-01-01 RX ADMIN — SODIUM CHLORIDE SOLN NEBU 3% 3 ML: 3 NEBU SOLN at 20:20

## 2024-01-01 RX ADMIN — FLUDROCORTISONE ACETATE 0.1 MG: 0.1 TABLET ORAL at 11:51

## 2024-01-01 RX ADMIN — ACETYLCYSTEINE 4 ML: 100 SOLUTION ORAL; RESPIRATORY (INHALATION) at 16:25

## 2024-01-01 RX ADMIN — Medication 4 UNITS/HR: at 18:52

## 2024-01-01 RX ADMIN — AZITHROMYCIN DIHYDRATE 250 MG: 250 TABLET ORAL at 08:24

## 2024-01-01 RX ADMIN — INSULIN GLARGINE 5 UNITS: 100 INJECTION, SOLUTION SUBCUTANEOUS at 06:32

## 2024-01-01 RX ADMIN — OXYCODONE HYDROCHLORIDE 5 MG: 5 TABLET ORAL at 05:23

## 2024-01-01 RX ADMIN — Medication 25 MCG/HR: at 22:34

## 2024-01-01 RX ADMIN — Medication 50 MCG: at 08:57

## 2024-01-01 RX ADMIN — LEVALBUTEROL HYDROCHLORIDE 0.63 MG: 0.63 SOLUTION RESPIRATORY (INHALATION) at 07:51

## 2024-01-01 RX ADMIN — INSULIN ASPART 1 UNITS: 100 INJECTION, SOLUTION INTRAVENOUS; SUBCUTANEOUS at 04:11

## 2024-01-01 RX ADMIN — ACETYLCYSTEINE 4 ML: 100 SOLUTION ORAL; RESPIRATORY (INHALATION) at 08:10

## 2024-01-01 RX ADMIN — LEVALBUTEROL HYDROCHLORIDE 0.63 MG: 0.63 SOLUTION RESPIRATORY (INHALATION) at 12:30

## 2024-01-01 RX ADMIN — ACETYLCYSTEINE 2 ML: 200 SOLUTION ORAL; RESPIRATORY (INHALATION) at 16:11

## 2024-01-01 RX ADMIN — PHENYLEPHRINE HYDROCHLORIDE 100 MCG: 10 INJECTION INTRAVENOUS at 14:17

## 2024-01-01 RX ADMIN — METOPROLOL TARTRATE 25 MG: 25 TABLET, FILM COATED ORAL at 20:08

## 2024-01-01 RX ADMIN — CALCIUM CHLORIDE, MAGNESIUM CHLORIDE, SODIUM CHLORIDE, SODIUM BICARBONATE, POTASSIUM CHLORIDE AND SODIUM PHOSPHATE DIBASIC DIHYDRATE 12.5 ML/KG/HR: 3.68; 3.05; 6.34; 3.09; .314; .187 INJECTION INTRAVENOUS at 13:56

## 2024-01-01 RX ADMIN — HYDROMORPHONE HYDROCHLORIDE 0.5 MG: 1 INJECTION, SOLUTION INTRAMUSCULAR; INTRAVENOUS; SUBCUTANEOUS at 16:59

## 2024-01-01 RX ADMIN — Medication 125 MCG/HR: at 03:02

## 2024-01-01 RX ADMIN — SULFAMETHOXAZOLE AND TRIMETHOPRIM 1 TABLET: 400; 80 TABLET ORAL at 08:28

## 2024-01-01 RX ADMIN — TACROLIMUS 0.6 MG: 5 CAPSULE ORAL at 08:49

## 2024-01-01 RX ADMIN — CEFAZOLIN SODIUM 2 G: 2 INJECTION, SOLUTION INTRAVENOUS at 09:11

## 2024-01-01 RX ADMIN — LIDOCAINE HYDROCHLORIDE 1 ML: 10 INJECTION, SOLUTION EPIDURAL; INFILTRATION; INTRACAUDAL; PERINEURAL at 12:41

## 2024-01-01 RX ADMIN — INSULIN ASPART 1 UNITS: 100 INJECTION, SOLUTION INTRAVENOUS; SUBCUTANEOUS at 20:32

## 2024-01-01 RX ADMIN — TACROLIMUS 0.4 MG: 5 CAPSULE ORAL at 17:29

## 2024-01-01 RX ADMIN — Medication 25 MG: at 21:39

## 2024-01-01 RX ADMIN — ESCITALOPRAM 10 MG: 5 SOLUTION ORAL at 08:19

## 2024-01-01 RX ADMIN — LEVALBUTEROL HYDROCHLORIDE 0.63 MG: 0.63 SOLUTION RESPIRATORY (INHALATION) at 08:08

## 2024-01-01 RX ADMIN — CALCIUM CHLORIDE, MAGNESIUM CHLORIDE, SODIUM CHLORIDE, SODIUM BICARBONATE, POTASSIUM CHLORIDE AND SODIUM PHOSPHATE DIBASIC DIHYDRATE 12.5 ML/KG/HR: 3.68; 3.05; 6.34; 3.09; .314; .187 INJECTION INTRAVENOUS at 19:50

## 2024-01-01 RX ADMIN — DAPTOMYCIN 400 MG: 500 INJECTION, POWDER, LYOPHILIZED, FOR SOLUTION INTRAVENOUS at 14:59

## 2024-01-01 RX ADMIN — TACROLIMUS 1.5 MG: 5 CAPSULE ORAL at 08:29

## 2024-01-01 RX ADMIN — LIDOCAINE HYDROCHLORIDE 5 ML: 20 SOLUTION OROPHARYNGEAL at 11:34

## 2024-01-01 RX ADMIN — LEVALBUTEROL HYDROCHLORIDE 0.63 MG: 0.63 SOLUTION RESPIRATORY (INHALATION) at 16:45

## 2024-01-01 RX ADMIN — CHLORHEXIDINE GLUCONATE 0.12% ORAL RINSE 15 ML: 1.2 LIQUID ORAL at 09:17

## 2024-01-01 RX ADMIN — LORAZEPAM 1 MG: 2 LIQUID ORAL at 06:13

## 2024-01-01 RX ADMIN — Medication 25 MG: at 22:01

## 2024-01-01 RX ADMIN — METOPROLOL TARTRATE 5 MG: 5 INJECTION INTRAVENOUS at 04:10

## 2024-01-01 RX ADMIN — ALBUTEROL SULFATE 2.5 MG: 2.5 SOLUTION RESPIRATORY (INHALATION) at 16:12

## 2024-01-01 RX ADMIN — METOPROLOL TARTRATE 25 MG: 25 TABLET, FILM COATED ORAL at 09:10

## 2024-01-01 RX ADMIN — TACROLIMUS 0.8 MG: 5 CAPSULE ORAL at 17:57

## 2024-01-01 RX ADMIN — Medication 4 UNITS/HR: at 02:16

## 2024-01-01 RX ADMIN — CHLORHEXIDINE GLUCONATE 0.12% ORAL RINSE 15 ML: 1.2 LIQUID ORAL at 20:18

## 2024-01-01 RX ADMIN — HYDROMORPHONE HYDROCHLORIDE 0.3 MG: 1 INJECTION, SOLUTION INTRAMUSCULAR; INTRAVENOUS; SUBCUTANEOUS at 08:28

## 2024-01-01 RX ADMIN — CALCIUM CHLORIDE, MAGNESIUM CHLORIDE, SODIUM CHLORIDE, SODIUM BICARBONATE, POTASSIUM CHLORIDE AND SODIUM PHOSPHATE DIBASIC DIHYDRATE: 3.68; 3.05; 6.34; 3.09; .314; .187 INJECTION INTRAVENOUS at 12:42

## 2024-01-01 RX ADMIN — PREDNISONE 50 MG: 50 TABLET ORAL at 08:03

## 2024-01-01 RX ADMIN — Medication 50 MCG/HR: at 06:00

## 2024-01-01 RX ADMIN — LIDOCAINE HYDROCHLORIDE 0.5 ML: 10 INJECTION, SOLUTION EPIDURAL; INFILTRATION; INTRACAUDAL; PERINEURAL at 17:08

## 2024-01-01 RX ADMIN — ACETAMINOPHEN 325 MG: 325 TABLET, FILM COATED ORAL at 13:17

## 2024-01-01 RX ADMIN — PREDNISONE 40 MG: 20 TABLET ORAL at 08:44

## 2024-01-01 RX ADMIN — Medication 50 MCG: at 08:28

## 2024-01-01 RX ADMIN — LEVALBUTEROL HYDROCHLORIDE 0.63 MG: 0.63 SOLUTION RESPIRATORY (INHALATION) at 16:51

## 2024-01-01 RX ADMIN — CHLORHEXIDINE GLUCONATE 0.12% ORAL RINSE 15 ML: 1.2 LIQUID ORAL at 21:24

## 2024-01-01 RX ADMIN — METOPROLOL TARTRATE 25 MG: 25 TABLET, FILM COATED ORAL at 18:14

## 2024-01-01 RX ADMIN — TACROLIMUS 1.5 MG: 5 CAPSULE ORAL at 09:12

## 2024-01-01 RX ADMIN — CALCIUM CHLORIDE, MAGNESIUM CHLORIDE, SODIUM CHLORIDE, SODIUM BICARBONATE, POTASSIUM CHLORIDE AND SODIUM PHOSPHATE DIBASIC DIHYDRATE 12.5 ML/KG/HR: 3.68; 3.05; 6.34; 3.09; .314; .187 INJECTION INTRAVENOUS at 10:41

## 2024-01-01 RX ADMIN — SODIUM CHLORIDE SOLN NEBU 3% 3 ML: 3 NEBU SOLN at 13:04

## 2024-01-01 RX ADMIN — Medication 1 PACKET: at 05:57

## 2024-01-01 RX ADMIN — TRAZODONE HYDROCHLORIDE 50 MG: 50 TABLET ORAL at 21:53

## 2024-01-01 RX ADMIN — HEPARIN SODIUM 5000 UNITS: 5000 INJECTION, SOLUTION INTRAVENOUS; SUBCUTANEOUS at 13:48

## 2024-01-01 RX ADMIN — SODIUM CHLORIDE SOLN NEBU 3% 3 ML: 3 NEBU SOLN at 21:53

## 2024-01-01 RX ADMIN — POSACONAZOLE 300 MG: 100 TABLET, DELAYED RELEASE ORAL at 11:19

## 2024-01-01 RX ADMIN — CHLORHEXIDINE GLUCONATE 0.12% ORAL RINSE 15 ML: 1.2 LIQUID ORAL at 20:34

## 2024-01-01 RX ADMIN — CALCIUM CHLORIDE, MAGNESIUM CHLORIDE, SODIUM CHLORIDE, SODIUM BICARBONATE, POTASSIUM CHLORIDE AND SODIUM PHOSPHATE DIBASIC DIHYDRATE: 3.68; 3.05; 6.34; 3.09; .314; .187 INJECTION INTRAVENOUS at 22:46

## 2024-01-01 RX ADMIN — Medication 50 MCG: at 09:00

## 2024-01-01 RX ADMIN — ACETAMINOPHEN 975 MG: 325 TABLET, FILM COATED ORAL at 21:08

## 2024-01-01 RX ADMIN — METOPROLOL TARTRATE 25 MG: 25 TABLET, FILM COATED ORAL at 20:58

## 2024-01-01 RX ADMIN — CALCIUM CHLORIDE, MAGNESIUM CHLORIDE, SODIUM CHLORIDE, SODIUM BICARBONATE, POTASSIUM CHLORIDE AND SODIUM PHOSPHATE DIBASIC DIHYDRATE 12.5 ML/KG/HR: 3.68; 3.05; 6.34; 3.09; .314; .187 INJECTION INTRAVENOUS at 11:04

## 2024-01-01 RX ADMIN — POSACONAZOLE 300 MG: 100 TABLET, DELAYED RELEASE ORAL at 11:59

## 2024-01-01 RX ADMIN — MONTELUKAST 10 MG: 10 TABLET, FILM COATED ORAL at 21:41

## 2024-01-01 RX ADMIN — Medication 5 ML: at 08:24

## 2024-01-01 RX ADMIN — EPOETIN ALFA-EPBX 4000 UNITS: 10000 INJECTION, SOLUTION INTRAVENOUS; SUBCUTANEOUS at 11:30

## 2024-01-01 RX ADMIN — MINOCYCLINE HYDROCHLORIDE 100 MG: 100 INJECTION INTRAVENOUS at 23:04

## 2024-01-01 RX ADMIN — HYDROCORTISONE SODIUM SUCCINATE 50 MG: 100 INJECTION, POWDER, FOR SOLUTION INTRAMUSCULAR; INTRAVENOUS at 17:37

## 2024-01-01 RX ADMIN — IODIXANOL 50 ML: 320 INJECTION, SOLUTION INTRAVASCULAR at 14:33

## 2024-01-01 RX ADMIN — TRAZODONE HYDROCHLORIDE 25 MG: 50 TABLET ORAL at 21:35

## 2024-01-01 RX ADMIN — MIDODRINE HYDROCHLORIDE 10 MG: 5 TABLET ORAL at 21:00

## 2024-01-01 RX ADMIN — HYDROCORTISONE SODIUM SUCCINATE 50 MG: 100 INJECTION, POWDER, FOR SOLUTION INTRAMUSCULAR; INTRAVENOUS at 16:18

## 2024-01-01 RX ADMIN — MIDODRINE HYDROCHLORIDE 15 MG: 5 TABLET ORAL at 13:02

## 2024-01-01 RX ADMIN — AZITHROMYCIN DIHYDRATE 250 MG: 250 TABLET ORAL at 08:49

## 2024-01-01 RX ADMIN — SODIUM CHLORIDE SOLN NEBU 3% 3 ML: 3 NEBU SOLN at 11:13

## 2024-01-01 RX ADMIN — INSULIN ASPART 2 UNITS: 100 INJECTION, SOLUTION INTRAVENOUS; SUBCUTANEOUS at 15:52

## 2024-01-01 RX ADMIN — ACETAMINOPHEN 325 MG: 325 TABLET, FILM COATED ORAL at 16:26

## 2024-01-01 RX ADMIN — LEVALBUTEROL HYDROCHLORIDE 0.63 MG: 0.63 SOLUTION RESPIRATORY (INHALATION) at 08:41

## 2024-01-01 RX ADMIN — MIDODRINE HYDROCHLORIDE 5 MG: 5 TABLET ORAL at 06:49

## 2024-01-01 RX ADMIN — LEVALBUTEROL HYDROCHLORIDE 0.63 MG: 0.63 SOLUTION RESPIRATORY (INHALATION) at 15:33

## 2024-01-01 RX ADMIN — ACETAMINOPHEN 325 MG: 325 TABLET, FILM COATED ORAL at 04:04

## 2024-01-01 RX ADMIN — HYDROCORTISONE SODIUM SUCCINATE 50 MG: 100 INJECTION, POWDER, FOR SOLUTION INTRAMUSCULAR; INTRAVENOUS at 16:04

## 2024-01-01 RX ADMIN — CALCIUM CHLORIDE, MAGNESIUM CHLORIDE, SODIUM CHLORIDE, SODIUM BICARBONATE, POTASSIUM CHLORIDE AND SODIUM PHOSPHATE DIBASIC DIHYDRATE 12.5 ML/KG/HR: 3.68; 3.05; 6.34; 3.09; .314; .187 INJECTION INTRAVENOUS at 17:37

## 2024-01-01 RX ADMIN — SULFAMETHOXAZOLE AND TRIMETHOPRIM 1 TABLET: 400; 80 TABLET ORAL at 19:33

## 2024-01-01 RX ADMIN — SODIUM CHLORIDE SOLN NEBU 3% 3 ML: 3 NEBU SOLN at 12:11

## 2024-01-01 RX ADMIN — METOPROLOL TARTRATE 25 MG: 25 TABLET, FILM COATED ORAL at 18:12

## 2024-01-01 RX ADMIN — Medication 1 PACKET: at 09:17

## 2024-01-01 RX ADMIN — METOPROLOL TARTRATE 25 MG: 100 TABLET, FILM COATED ORAL at 21:06

## 2024-01-01 RX ADMIN — Medication 1 PACKET: at 07:40

## 2024-01-01 RX ADMIN — EPHEDRINE SULFATE 10 MG: 5 INJECTION INTRAVENOUS at 11:56

## 2024-01-01 RX ADMIN — MIDODRINE HYDROCHLORIDE 15 MG: 5 TABLET ORAL at 21:06

## 2024-01-01 RX ADMIN — MONTELUKAST 10 MG: 10 TABLET, FILM COATED ORAL at 20:59

## 2024-01-01 RX ADMIN — CHLORHEXIDINE GLUCONATE 0.12% ORAL RINSE 15 ML: 1.2 LIQUID ORAL at 08:03

## 2024-01-01 RX ADMIN — Medication 1.8 UNITS/HR: at 15:29

## 2024-01-01 RX ADMIN — TRAZODONE HYDROCHLORIDE 50 MG: 50 TABLET ORAL at 21:43

## 2024-01-01 RX ADMIN — Medication 1 MG: at 17:24

## 2024-01-01 RX ADMIN — Medication 5 ML: at 08:12

## 2024-01-01 RX ADMIN — Medication 5 ML: at 08:28

## 2024-01-01 RX ADMIN — ACETYLCYSTEINE 4 ML: 100 SOLUTION ORAL; RESPIRATORY (INHALATION) at 21:35

## 2024-01-01 RX ADMIN — MIDODRINE HYDROCHLORIDE 15 MG: 5 TABLET ORAL at 10:48

## 2024-01-01 RX ADMIN — LORAZEPAM 0.5 MG: 2 INJECTION INTRAMUSCULAR; INTRAVENOUS at 13:08

## 2024-01-01 RX ADMIN — CHLORHEXIDINE GLUCONATE 0.12% ORAL RINSE 15 ML: 1.2 LIQUID ORAL at 08:44

## 2024-01-01 RX ADMIN — HALOPERIDOL 5 MG: 2 SOLUTION ORAL at 16:15

## 2024-01-01 RX ADMIN — LIDOCAINE HYDROCHLORIDE 0.5 ML: 10 INJECTION, SOLUTION EPIDURAL; INFILTRATION; INTRACAUDAL; PERINEURAL at 07:55

## 2024-01-01 RX ADMIN — Medication 40 MG: at 11:57

## 2024-01-01 RX ADMIN — INSULIN ASPART 2 UNITS: 100 INJECTION, SOLUTION INTRAVENOUS; SUBCUTANEOUS at 17:02

## 2024-01-01 RX ADMIN — OXYCODONE HYDROCHLORIDE 5 MG: 100 SOLUTION ORAL at 09:49

## 2024-01-01 RX ADMIN — TACROLIMUS 1 MG: 5 CAPSULE ORAL at 09:18

## 2024-01-01 RX ADMIN — Medication 2.4 UNITS/HR: at 05:03

## 2024-01-01 RX ADMIN — ACETYLCYSTEINE 4 ML: 100 SOLUTION ORAL; RESPIRATORY (INHALATION) at 15:45

## 2024-01-01 RX ADMIN — TACROLIMUS 0.8 MG: 5 CAPSULE ORAL at 18:08

## 2024-01-01 RX ADMIN — HYDROMORPHONE HYDROCHLORIDE 0.2 MG: 0.2 INJECTION, SOLUTION INTRAMUSCULAR; INTRAVENOUS; SUBCUTANEOUS at 12:52

## 2024-01-01 RX ADMIN — AZITHROMYCIN DIHYDRATE 250 MG: 250 TABLET ORAL at 07:28

## 2024-01-01 RX ADMIN — TRAZODONE HYDROCHLORIDE 50 MG: 50 TABLET ORAL at 20:51

## 2024-01-01 RX ADMIN — MICAFUNGIN SODIUM 150 MG: 50 INJECTION, POWDER, LYOPHILIZED, FOR SOLUTION INTRAVENOUS at 01:03

## 2024-01-01 RX ADMIN — HYDROXYZINE HYDROCHLORIDE 25 MG: 25 TABLET ORAL at 04:13

## 2024-01-01 RX ADMIN — ACETYLCYSTEINE 2 ML: 200 SOLUTION ORAL; RESPIRATORY (INHALATION) at 17:30

## 2024-01-01 RX ADMIN — MINOCYCLINE HYDROCHLORIDE 100 MG: 100 INJECTION INTRAVENOUS at 00:27

## 2024-01-01 RX ADMIN — HEPARIN SODIUM 5000 UNITS: 5000 INJECTION, SOLUTION INTRAVENOUS; SUBCUTANEOUS at 20:44

## 2024-01-01 RX ADMIN — HYDROCORTISONE SODIUM SUCCINATE 25 MG: 100 INJECTION, POWDER, FOR SOLUTION INTRAMUSCULAR; INTRAVENOUS at 08:14

## 2024-01-01 RX ADMIN — MIDODRINE HYDROCHLORIDE 15 MG: 5 TABLET ORAL at 17:57

## 2024-01-01 RX ADMIN — MINOCYCLINE HYDROCHLORIDE 100 MG: 100 INJECTION INTRAVENOUS at 12:08

## 2024-01-01 RX ADMIN — ACETAMINOPHEN 975 MG: 325 TABLET, FILM COATED ORAL at 23:09

## 2024-01-01 RX ADMIN — LEVOFLOXACIN 500 MG: 500 INJECTION, SOLUTION INTRAVENOUS at 16:18

## 2024-01-01 RX ADMIN — MONTELUKAST 10 MG: 10 TABLET, FILM COATED ORAL at 20:12

## 2024-01-01 RX ADMIN — Medication 0.05 MG: at 08:49

## 2024-01-01 RX ADMIN — Medication 40 MG: at 21:52

## 2024-01-01 RX ADMIN — MINOCYCLINE HYDROCHLORIDE 100 MG: 100 INJECTION INTRAVENOUS at 11:19

## 2024-01-01 RX ADMIN — HEPARIN SODIUM 5000 UNITS: 5000 INJECTION, SOLUTION INTRAVENOUS; SUBCUTANEOUS at 06:06

## 2024-01-01 RX ADMIN — LEVALBUTEROL HYDROCHLORIDE 0.63 MG: 0.63 SOLUTION RESPIRATORY (INHALATION) at 16:19

## 2024-01-01 RX ADMIN — SODIUM CHLORIDE SOLN NEBU 3% 3 ML: 3 NEBU SOLN at 20:09

## 2024-01-01 RX ADMIN — FENTANYL CITRATE 50 MCG: 50 INJECTION INTRAMUSCULAR; INTRAVENOUS at 13:21

## 2024-01-01 RX ADMIN — LEVALBUTEROL HYDROCHLORIDE 0.63 MG: 0.63 SOLUTION RESPIRATORY (INHALATION) at 13:27

## 2024-01-01 RX ADMIN — LEVALBUTEROL HYDROCHLORIDE 0.63 MG: 0.63 SOLUTION RESPIRATORY (INHALATION) at 08:00

## 2024-01-01 RX ADMIN — CHLORHEXIDINE GLUCONATE 0.12% ORAL RINSE 15 ML: 1.2 LIQUID ORAL at 21:06

## 2024-01-01 RX ADMIN — TACROLIMUS 0.6 MG: 5 CAPSULE ORAL at 08:54

## 2024-01-01 RX ADMIN — HEPARIN SODIUM 1500 UNITS: 1000 INJECTION INTRAVENOUS; SUBCUTANEOUS at 16:41

## 2024-01-01 RX ADMIN — INSULIN ASPART 1 UNITS: 100 INJECTION, SOLUTION INTRAVENOUS; SUBCUTANEOUS at 04:34

## 2024-01-01 RX ADMIN — ACETAMINOPHEN 325 MG: 325 TABLET, FILM COATED ORAL at 04:55

## 2024-01-01 RX ADMIN — FENTANYL CITRATE 50 MCG: 50 INJECTION INTRAMUSCULAR; INTRAVENOUS at 23:04

## 2024-01-01 RX ADMIN — LEVALBUTEROL HYDROCHLORIDE 0.63 MG: 0.63 SOLUTION RESPIRATORY (INHALATION) at 12:03

## 2024-01-01 RX ADMIN — PREDNISONE 5 MG: 5 TABLET ORAL at 08:56

## 2024-01-01 RX ADMIN — ACETYLCYSTEINE 2 ML: 200 SOLUTION ORAL; RESPIRATORY (INHALATION) at 16:09

## 2024-01-01 RX ADMIN — ACETAMINOPHEN 325 MG: 325 TABLET, FILM COATED ORAL at 12:08

## 2024-01-01 RX ADMIN — CALCIUM CHLORIDE, MAGNESIUM CHLORIDE, SODIUM CHLORIDE, SODIUM BICARBONATE, POTASSIUM CHLORIDE AND SODIUM PHOSPHATE DIBASIC DIHYDRATE: 3.68; 3.05; 6.34; 3.09; .314; .187 INJECTION INTRAVENOUS at 14:03

## 2024-01-01 RX ADMIN — LEVALBUTEROL HYDROCHLORIDE 0.63 MG: 0.63 SOLUTION RESPIRATORY (INHALATION) at 13:56

## 2024-01-01 RX ADMIN — LEVALBUTEROL HYDROCHLORIDE 0.63 MG: 0.63 SOLUTION RESPIRATORY (INHALATION) at 16:28

## 2024-01-01 RX ADMIN — TACROLIMUS 0.8 MG: 5 CAPSULE ORAL at 07:31

## 2024-01-01 RX ADMIN — LEVALBUTEROL HYDROCHLORIDE 0.63 MG: 0.63 SOLUTION RESPIRATORY (INHALATION) at 16:00

## 2024-01-01 RX ADMIN — CARVEDILOL 6.25 MG: 6.25 TABLET, FILM COATED ORAL at 20:44

## 2024-01-01 RX ADMIN — POSACONAZOLE 300 MG: 100 TABLET, DELAYED RELEASE ORAL at 12:10

## 2024-01-01 RX ADMIN — MAGNESIUM SULFATE HEPTAHYDRATE 2 G: 2 INJECTION, SOLUTION INTRAVENOUS at 09:11

## 2024-01-01 RX ADMIN — LEVALBUTEROL HYDROCHLORIDE 0.63 MG: 0.63 SOLUTION RESPIRATORY (INHALATION) at 16:25

## 2024-01-01 RX ADMIN — CHLORHEXIDINE GLUCONATE 0.12% ORAL RINSE 15 ML: 1.2 LIQUID ORAL at 07:53

## 2024-01-01 RX ADMIN — Medication 1 PACKET: at 08:07

## 2024-01-01 RX ADMIN — SULFAMETHOXAZOLE AND TRIMETHOPRIM 1 TABLET: 400; 80 TABLET ORAL at 20:08

## 2024-01-01 RX ADMIN — Medication 50 MCG: at 12:53

## 2024-01-01 RX ADMIN — OLANZAPINE 2.5 MG: 10 INJECTION, POWDER, FOR SOLUTION INTRAMUSCULAR at 11:56

## 2024-01-01 RX ADMIN — Medication 40 MG: at 20:21

## 2024-01-01 RX ADMIN — CALCIUM CHLORIDE, MAGNESIUM CHLORIDE, SODIUM CHLORIDE, SODIUM BICARBONATE, POTASSIUM CHLORIDE AND SODIUM PHOSPHATE DIBASIC DIHYDRATE 12.5 ML/KG/HR: 3.68; 3.05; 6.34; 3.09; .314; .187 INJECTION INTRAVENOUS at 14:14

## 2024-01-01 RX ADMIN — HYDROCORTISONE SODIUM SUCCINATE 25 MG: 100 INJECTION, POWDER, FOR SOLUTION INTRAMUSCULAR; INTRAVENOUS at 20:03

## 2024-01-01 RX ADMIN — LEVALBUTEROL HYDROCHLORIDE 0.63 MG: 0.63 SOLUTION RESPIRATORY (INHALATION) at 16:48

## 2024-01-01 RX ADMIN — CEFTAZIDIME 2 G: 2 INJECTION, POWDER, FOR SOLUTION INTRAVENOUS at 20:01

## 2024-01-01 RX ADMIN — Medication 1 PACKET: at 08:52

## 2024-01-01 RX ADMIN — Medication 40 MG: at 21:00

## 2024-01-01 RX ADMIN — Medication 1 MG: at 08:47

## 2024-01-01 RX ADMIN — MICAFUNGIN SODIUM 150 MG: 50 INJECTION, POWDER, LYOPHILIZED, FOR SOLUTION INTRAVENOUS at 01:26

## 2024-01-01 RX ADMIN — LEVALBUTEROL HYDROCHLORIDE 0.63 MG: 0.63 SOLUTION RESPIRATORY (INHALATION) at 08:57

## 2024-01-01 RX ADMIN — PREDNISONE 5 MG: 5 TABLET ORAL at 09:40

## 2024-01-01 RX ADMIN — SODIUM CHLORIDE SOLN NEBU 3% 3 ML: 3 NEBU SOLN at 21:04

## 2024-01-01 RX ADMIN — RAMELTEON 8 MG: 8 TABLET, FILM COATED ORAL at 20:12

## 2024-01-01 RX ADMIN — CEFTAZIDIME 2 G: 2 INJECTION, POWDER, FOR SOLUTION INTRAVENOUS at 21:02

## 2024-01-01 RX ADMIN — PROPOFOL 10 MCG/KG/MIN: 10 INJECTION, EMULSION INTRAVENOUS at 10:55

## 2024-01-01 RX ADMIN — SULFAMETHOXAZOLE AND TRIMETHOPRIM 1 TABLET: 400; 80 TABLET ORAL at 20:12

## 2024-01-01 RX ADMIN — MONTELUKAST 10 MG: 10 TABLET, FILM COATED ORAL at 21:39

## 2024-01-01 RX ADMIN — HYDROMORPHONE HYDROCHLORIDE 0.5 MG: 1 INJECTION, SOLUTION INTRAMUSCULAR; INTRAVENOUS; SUBCUTANEOUS at 12:16

## 2024-01-01 RX ADMIN — ACETYLCYSTEINE 4 ML: 100 SOLUTION ORAL; RESPIRATORY (INHALATION) at 08:21

## 2024-01-01 RX ADMIN — INSULIN GLARGINE 5 UNITS: 100 INJECTION, SOLUTION SUBCUTANEOUS at 11:13

## 2024-01-01 RX ADMIN — TACROLIMUS 1 MG: 5 CAPSULE ORAL at 08:19

## 2024-01-01 RX ADMIN — ACETYLCYSTEINE 4 ML: 100 SOLUTION ORAL; RESPIRATORY (INHALATION) at 08:00

## 2024-01-01 RX ADMIN — CEFTAZIDIME 2 G: 2 INJECTION, POWDER, FOR SOLUTION INTRAVENOUS at 05:04

## 2024-01-01 RX ADMIN — INSULIN HUMAN 8 UNITS: 100 INJECTION, SUSPENSION SUBCUTANEOUS at 21:21

## 2024-01-01 RX ADMIN — LEVALBUTEROL HYDROCHLORIDE 0.63 MG: 0.63 SOLUTION RESPIRATORY (INHALATION) at 19:39

## 2024-01-01 RX ADMIN — VALGANCICLOVIR HYDROCHLORIDE 450 MG: 50 POWDER, FOR SOLUTION ORAL at 08:01

## 2024-01-01 RX ADMIN — SODIUM CHLORIDE, POTASSIUM CHLORIDE, SODIUM LACTATE AND CALCIUM CHLORIDE 500 ML: 600; 310; 30; 20 INJECTION, SOLUTION INTRAVENOUS at 01:38

## 2024-01-01 RX ADMIN — INSULIN HUMAN 8 UNITS: 100 INJECTION, SUSPENSION SUBCUTANEOUS at 08:56

## 2024-01-01 RX ADMIN — SODIUM CHLORIDE 100 ML: 9 INJECTION, SOLUTION INTRAVENOUS at 19:09

## 2024-01-01 RX ADMIN — TACROLIMUS 1.5 MG: 5 CAPSULE ORAL at 17:51

## 2024-01-01 RX ADMIN — CHLORHEXIDINE GLUCONATE 0.12% ORAL RINSE 15 ML: 1.2 LIQUID ORAL at 20:10

## 2024-01-01 RX ADMIN — TACROLIMUS 0.8 MG: 5 CAPSULE ORAL at 18:15

## 2024-01-01 RX ADMIN — HEPARIN SODIUM 5000 UNITS: 5000 INJECTION, SOLUTION INTRAVENOUS; SUBCUTANEOUS at 05:02

## 2024-01-01 RX ADMIN — HEPARIN SODIUM 500 UNITS/HR: 1000 INJECTION INTRAVENOUS; SUBCUTANEOUS at 10:59

## 2024-01-01 RX ADMIN — Medication 1 PACKET: at 19:39

## 2024-01-01 RX ADMIN — SULFAMETHOXAZOLE AND TRIMETHOPRIM 80 MG: 200; 40 SUSPENSION ORAL at 21:19

## 2024-01-01 RX ADMIN — SODIUM CHLORIDE SOLN NEBU 3% 3 ML: 3 NEBU SOLN at 19:59

## 2024-01-01 RX ADMIN — CALCIUM GLUCONATE 2 G: 20 INJECTION, SOLUTION INTRAVENOUS at 15:30

## 2024-01-01 RX ADMIN — SODIUM CHLORIDE SOLN NEBU 3% 3 ML: 3 NEBU SOLN at 20:58

## 2024-01-01 RX ADMIN — LEVALBUTEROL HYDROCHLORIDE 0.63 MG: 0.63 SOLUTION RESPIRATORY (INHALATION) at 08:28

## 2024-01-01 RX ADMIN — LINEZOLID 600 MG: 600 TABLET, FILM COATED ORAL at 12:51

## 2024-01-01 RX ADMIN — HYDROMORPHONE HYDROCHLORIDE 0.5 MG: 1 INJECTION, SOLUTION INTRAMUSCULAR; INTRAVENOUS; SUBCUTANEOUS at 01:01

## 2024-01-01 RX ADMIN — PREDNISONE 5 MG: 5 TABLET ORAL at 08:22

## 2024-01-01 RX ADMIN — HYDROCORTISONE SODIUM SUCCINATE 50 MG: 100 INJECTION, POWDER, FOR SOLUTION INTRAMUSCULAR; INTRAVENOUS at 04:15

## 2024-01-01 RX ADMIN — CALCIUM CHLORIDE, MAGNESIUM CHLORIDE, SODIUM CHLORIDE, SODIUM BICARBONATE, POTASSIUM CHLORIDE AND SODIUM PHOSPHATE DIBASIC DIHYDRATE 12.5 ML/KG/HR: 3.68; 3.05; 6.34; 3.09; .314; .187 INJECTION INTRAVENOUS at 05:19

## 2024-01-01 RX ADMIN — LORAZEPAM 1 MG: 2 LIQUID ORAL at 08:48

## 2024-01-01 RX ADMIN — TACROLIMUS 1.5 MG: 5 CAPSULE ORAL at 09:05

## 2024-01-01 RX ADMIN — HEPARIN SODIUM 500 UNITS/HR: 1000 INJECTION INTRAVENOUS; SUBCUTANEOUS at 14:15

## 2024-01-01 RX ADMIN — Medication 1 TABLET: at 08:19

## 2024-01-01 RX ADMIN — SODIUM CHLORIDE, POTASSIUM CHLORIDE, SODIUM LACTATE AND CALCIUM CHLORIDE 250 ML: 600; 310; 30; 20 INJECTION, SOLUTION INTRAVENOUS at 15:49

## 2024-01-01 RX ADMIN — CHLORHEXIDINE GLUCONATE 0.12% ORAL RINSE 15 ML: 1.2 LIQUID ORAL at 20:14

## 2024-01-01 RX ADMIN — MIDODRINE HYDROCHLORIDE 20 MG: 5 TABLET ORAL at 13:26

## 2024-01-01 RX ADMIN — TRAZODONE HYDROCHLORIDE 50 MG: 50 TABLET ORAL at 20:59

## 2024-01-01 RX ADMIN — Medication 50 MCG/HR: at 08:03

## 2024-01-01 RX ADMIN — CHLORHEXIDINE GLUCONATE 0.12% ORAL RINSE 15 ML: 1.2 LIQUID ORAL at 21:19

## 2024-01-01 RX ADMIN — MICONAZOLE NITRATE: 20 POWDER TOPICAL at 12:22

## 2024-01-01 RX ADMIN — ACETAMINOPHEN 975 MG: 325 TABLET, FILM COATED ORAL at 21:00

## 2024-01-01 RX ADMIN — Medication 10 MG: at 12:46

## 2024-01-01 RX ADMIN — EPOETIN ALFA-EPBX 7000 UNITS: 10000 INJECTION, SOLUTION INTRAVENOUS; SUBCUTANEOUS at 12:07

## 2024-01-01 RX ADMIN — HYDROCORTISONE SODIUM SUCCINATE 25 MG: 100 INJECTION, POWDER, FOR SOLUTION INTRAMUSCULAR; INTRAVENOUS at 20:12

## 2024-01-01 RX ADMIN — MINOCYCLINE HYDROCHLORIDE 100 MG: 100 INJECTION INTRAVENOUS at 12:04

## 2024-01-01 RX ADMIN — INSULIN HUMAN 8 UNITS: 100 INJECTION, SUSPENSION SUBCUTANEOUS at 08:36

## 2024-01-01 RX ADMIN — ACETYLCYSTEINE 4 ML: 100 SOLUTION ORAL; RESPIRATORY (INHALATION) at 16:50

## 2024-01-01 RX ADMIN — HYDROMORPHONE HYDROCHLORIDE 0.2 MG: 1 INJECTION, SOLUTION INTRAMUSCULAR; INTRAVENOUS; SUBCUTANEOUS at 05:46

## 2024-01-01 RX ADMIN — ACETYLCYSTEINE 4 ML: 100 SOLUTION ORAL; RESPIRATORY (INHALATION) at 08:43

## 2024-01-01 RX ADMIN — ACETYLCYSTEINE 2 ML: 200 SOLUTION ORAL; RESPIRATORY (INHALATION) at 16:47

## 2024-01-01 RX ADMIN — ALBUTEROL SULFATE 2.5 MG: 2.5 SOLUTION RESPIRATORY (INHALATION) at 15:43

## 2024-01-01 RX ADMIN — LEVALBUTEROL HYDROCHLORIDE 0.63 MG: 0.63 SOLUTION RESPIRATORY (INHALATION) at 16:57

## 2024-01-01 RX ADMIN — AZITHROMYCIN DIHYDRATE 250 MG: 250 TABLET ORAL at 08:28

## 2024-01-01 RX ADMIN — AZITHROMYCIN DIHYDRATE 250 MG: 250 TABLET ORAL at 08:12

## 2024-01-01 RX ADMIN — CEFTAZIDIME 2 G: 2 INJECTION, POWDER, FOR SOLUTION INTRAVENOUS at 04:00

## 2024-01-01 RX ADMIN — HEPARIN SODIUM 5000 UNITS: 5000 INJECTION, SOLUTION INTRAVENOUS; SUBCUTANEOUS at 21:34

## 2024-01-01 RX ADMIN — RAMELTEON 8 MG: 8 TABLET, FILM COATED ORAL at 21:00

## 2024-01-01 RX ADMIN — VALGANCICLOVIR HYDROCHLORIDE 450 MG: 50 POWDER, FOR SOLUTION ORAL at 07:40

## 2024-01-01 RX ADMIN — CALCIUM CARBONATE (ANTACID) CHEW TAB 500 MG 500 MG: 500 CHEW TAB at 08:00

## 2024-01-01 RX ADMIN — LEVALBUTEROL HYDROCHLORIDE 0.63 MG: 0.63 SOLUTION RESPIRATORY (INHALATION) at 17:02

## 2024-01-01 RX ADMIN — Medication 100 MCG/HR: at 05:01

## 2024-01-01 RX ADMIN — POLYETHYLENE GLYCOL 3350 17 G: 17 POWDER, FOR SOLUTION ORAL at 08:50

## 2024-01-01 RX ADMIN — VALGANCICLOVIR HYDROCHLORIDE 450 MG: 50 POWDER, FOR SOLUTION ORAL at 23:40

## 2024-01-01 RX ADMIN — FENTANYL CITRATE 50 MCG: 50 INJECTION INTRAMUSCULAR; INTRAVENOUS at 21:35

## 2024-01-01 RX ADMIN — Medication 60 ML: at 08:58

## 2024-01-01 RX ADMIN — ACETYLCYSTEINE 2 ML: 200 SOLUTION ORAL; RESPIRATORY (INHALATION) at 15:37

## 2024-01-01 RX ADMIN — CALCIUM CHLORIDE, MAGNESIUM CHLORIDE, SODIUM CHLORIDE, SODIUM BICARBONATE, POTASSIUM CHLORIDE AND SODIUM PHOSPHATE DIBASIC DIHYDRATE 12.5 ML/KG/HR: 3.68; 3.05; 6.34; 3.09; .314; .187 INJECTION INTRAVENOUS at 02:13

## 2024-01-01 RX ADMIN — CHLORHEXIDINE GLUCONATE 0.12% ORAL RINSE 15 ML: 1.2 LIQUID ORAL at 20:29

## 2024-01-01 RX ADMIN — SODIUM CHLORIDE 300 ML: 9 INJECTION, SOLUTION INTRAVENOUS at 07:56

## 2024-01-01 RX ADMIN — TACROLIMUS 0.8 MG: 5 CAPSULE ORAL at 08:03

## 2024-01-01 RX ADMIN — TACROLIMUS 0.8 MG: 5 CAPSULE ORAL at 08:16

## 2024-01-01 RX ADMIN — CALCIUM CHLORIDE, MAGNESIUM CHLORIDE, SODIUM CHLORIDE, SODIUM BICARBONATE, POTASSIUM CHLORIDE AND SODIUM PHOSPHATE DIBASIC DIHYDRATE 12.5 ML/KG/HR: 3.68; 3.05; 6.34; 3.09; .314; .187 INJECTION INTRAVENOUS at 12:13

## 2024-01-01 RX ADMIN — LEVALBUTEROL HYDROCHLORIDE 0.63 MG: 0.63 SOLUTION RESPIRATORY (INHALATION) at 09:21

## 2024-01-01 RX ADMIN — SULFAMETHOXAZOLE AND TRIMETHOPRIM 1 TABLET: 400; 80 TABLET ORAL at 20:01

## 2024-01-01 RX ADMIN — ESCITALOPRAM 10 MG: 5 SOLUTION ORAL at 08:47

## 2024-01-01 RX ADMIN — CHLORHEXIDINE GLUCONATE 0.12% ORAL RINSE 15 ML: 1.2 LIQUID ORAL at 20:48

## 2024-01-01 RX ADMIN — HEPARIN SODIUM 5000 UNITS: 5000 INJECTION, SOLUTION INTRAVENOUS; SUBCUTANEOUS at 14:35

## 2024-01-01 RX ADMIN — CALCIUM CARBONATE (ANTACID) CHEW TAB 500 MG 500 MG: 500 CHEW TAB at 08:13

## 2024-01-01 RX ADMIN — INSULIN HUMAN 8 UNITS: 100 INJECTION, SUSPENSION SUBCUTANEOUS at 08:07

## 2024-01-01 RX ADMIN — POSACONAZOLE 300 MG: 100 TABLET, DELAYED RELEASE ORAL at 12:37

## 2024-01-01 RX ADMIN — LINEZOLID 600 MG: 600 TABLET, FILM COATED ORAL at 12:10

## 2024-01-01 RX ADMIN — CHLORHEXIDINE GLUCONATE 0.12% ORAL RINSE 15 ML: 1.2 LIQUID ORAL at 08:11

## 2024-01-01 RX ADMIN — SODIUM CHLORIDE SOLN NEBU 3% 3 ML: 3 NEBU SOLN at 12:42

## 2024-01-01 RX ADMIN — ACETAMINOPHEN 325 MG: 325 TABLET, FILM COATED ORAL at 15:56

## 2024-01-01 RX ADMIN — INSULIN HUMAN 8 UNITS: 100 INJECTION, SUSPENSION SUBCUTANEOUS at 08:46

## 2024-01-01 RX ADMIN — INSULIN ASPART 2 UNITS: 100 INJECTION, SOLUTION INTRAVENOUS; SUBCUTANEOUS at 21:20

## 2024-01-01 RX ADMIN — ACETAMINOPHEN 325 MG: 325 TABLET, FILM COATED ORAL at 03:29

## 2024-01-01 RX ADMIN — ACETAMINOPHEN 975 MG: 325 TABLET, FILM COATED ORAL at 21:28

## 2024-01-01 RX ADMIN — RAMELTEON 8 MG: 8 TABLET ORAL at 20:15

## 2024-01-01 RX ADMIN — LORAZEPAM 1 MG: 2 LIQUID ORAL at 21:59

## 2024-01-01 RX ADMIN — CALCIUM CHLORIDE, MAGNESIUM CHLORIDE, SODIUM CHLORIDE, SODIUM BICARBONATE, POTASSIUM CHLORIDE AND SODIUM PHOSPHATE DIBASIC DIHYDRATE: 3.68; 3.05; 6.34; 3.09; .314; .187 INJECTION INTRAVENOUS at 16:54

## 2024-01-01 RX ADMIN — LEVALBUTEROL HYDROCHLORIDE 0.63 MG: 0.63 SOLUTION RESPIRATORY (INHALATION) at 16:56

## 2024-01-01 RX ADMIN — TACROLIMUS 0.8 MG: 5 CAPSULE ORAL at 08:06

## 2024-01-01 RX ADMIN — EPOETIN ALFA-EPBX 4000 UNITS: 4000 INJECTION, SOLUTION INTRAVENOUS; SUBCUTANEOUS at 16:02

## 2024-01-01 RX ADMIN — TACROLIMUS 1.5 MG: 5 CAPSULE ORAL at 09:09

## 2024-01-01 RX ADMIN — RAMELTEON 8 MG: 8 TABLET ORAL at 21:09

## 2024-01-01 RX ADMIN — ACETYLCYSTEINE 4 ML: 100 SOLUTION ORAL; RESPIRATORY (INHALATION) at 15:40

## 2024-01-01 RX ADMIN — PREDNISONE 5 MG: 5 TABLET ORAL at 08:23

## 2024-01-01 RX ADMIN — Medication 3 MG: at 20:01

## 2024-01-01 RX ADMIN — CALCIUM CHLORIDE, MAGNESIUM CHLORIDE, SODIUM CHLORIDE, SODIUM BICARBONATE, POTASSIUM CHLORIDE AND SODIUM PHOSPHATE DIBASIC DIHYDRATE 12.5 ML/KG/HR: 3.68; 3.05; 6.34; 3.09; .314; .187 INJECTION INTRAVENOUS at 08:10

## 2024-01-01 RX ADMIN — EPHEDRINE SULFATE 10 MG: 5 INJECTION INTRAVENOUS at 12:22

## 2024-01-01 RX ADMIN — HYDROCORTISONE SODIUM SUCCINATE 25 MG: 100 INJECTION, POWDER, FOR SOLUTION INTRAMUSCULAR; INTRAVENOUS at 08:49

## 2024-01-01 RX ADMIN — ALBUTEROL SULFATE 2.5 MG: 2.5 SOLUTION RESPIRATORY (INHALATION) at 07:20

## 2024-01-01 RX ADMIN — ACETYLCYSTEINE 2 ML: 200 SOLUTION ORAL; RESPIRATORY (INHALATION) at 15:46

## 2024-01-01 RX ADMIN — POSACONAZOLE 300 MG: 100 TABLET, DELAYED RELEASE ORAL at 12:55

## 2024-01-01 RX ADMIN — HYDROCORTISONE SODIUM SUCCINATE 25 MG: 100 INJECTION, POWDER, FOR SOLUTION INTRAMUSCULAR; INTRAVENOUS at 08:45

## 2024-01-01 RX ADMIN — ALBUTEROL SULFATE 2.5 MG: 2.5 SOLUTION RESPIRATORY (INHALATION) at 21:01

## 2024-01-01 RX ADMIN — MIDODRINE HYDROCHLORIDE 15 MG: 5 TABLET ORAL at 05:47

## 2024-01-01 RX ADMIN — LORAZEPAM 0.25 MG: 2 INJECTION INTRAMUSCULAR; INTRAVENOUS at 08:39

## 2024-01-01 RX ADMIN — HYDROMORPHONE HYDROCHLORIDE 0.5 MG: 1 INJECTION, SOLUTION INTRAMUSCULAR; INTRAVENOUS; SUBCUTANEOUS at 05:21

## 2024-01-01 RX ADMIN — SODIUM CHLORIDE 300 ML: 9 INJECTION, SOLUTION INTRAVENOUS at 08:20

## 2024-01-01 RX ADMIN — HYDROXYZINE HYDROCHLORIDE 25 MG: 25 TABLET ORAL at 17:48

## 2024-01-01 RX ADMIN — Medication 40 MG: at 20:57

## 2024-01-01 RX ADMIN — ACETYLCYSTEINE 4 ML: 100 SOLUTION ORAL; RESPIRATORY (INHALATION) at 08:57

## 2024-01-01 RX ADMIN — MIDODRINE HYDROCHLORIDE 15 MG: 5 TABLET ORAL at 06:16

## 2024-01-01 RX ADMIN — LORAZEPAM 0.5 MG: 2 LIQUID ORAL at 23:50

## 2024-01-01 RX ADMIN — Medication 25 MG: at 21:50

## 2024-01-01 RX ADMIN — Medication 5 ML: at 08:18

## 2024-01-01 RX ADMIN — TRAZODONE HYDROCHLORIDE 50 MG: 50 TABLET ORAL at 21:27

## 2024-01-01 RX ADMIN — RAMELTEON 8 MG: 8 TABLET, FILM COATED ORAL at 21:53

## 2024-01-01 RX ADMIN — CHLORHEXIDINE GLUCONATE 0.12% ORAL RINSE 15 ML: 1.2 LIQUID ORAL at 08:29

## 2024-01-01 RX ADMIN — QUETIAPINE FUMARATE 25 MG: 25 TABLET ORAL at 04:45

## 2024-01-01 RX ADMIN — LORAZEPAM 1 MG: 2 INJECTION INTRAMUSCULAR; INTRAVENOUS at 15:18

## 2024-01-01 RX ADMIN — MICAFUNGIN SODIUM 150 MG: 50 INJECTION, POWDER, LYOPHILIZED, FOR SOLUTION INTRAVENOUS at 00:26

## 2024-01-01 RX ADMIN — HYDROCORTISONE SODIUM SUCCINATE 50 MG: 100 INJECTION, POWDER, FOR SOLUTION INTRAMUSCULAR; INTRAVENOUS at 04:14

## 2024-01-01 RX ADMIN — Medication 60 ML: at 09:13

## 2024-01-01 RX ADMIN — INSULIN HUMAN 8 UNITS: 100 INJECTION, SUSPENSION SUBCUTANEOUS at 09:19

## 2024-01-01 RX ADMIN — Medication 1 TABLET: at 07:39

## 2024-01-01 RX ADMIN — ANORECTAL OINTMENT: 15.7; .44; 24; 20.6 OINTMENT TOPICAL at 08:58

## 2024-01-01 RX ADMIN — Medication 50 MCG: at 08:24

## 2024-01-01 RX ADMIN — INSULIN ASPART 1 UNITS: 100 INJECTION, SOLUTION INTRAVENOUS; SUBCUTANEOUS at 08:10

## 2024-01-01 RX ADMIN — LORAZEPAM 1 MG: 2 LIQUID ORAL at 12:16

## 2024-01-01 RX ADMIN — LEVOFLOXACIN 500 MG: 500 INJECTION, SOLUTION INTRAVENOUS at 16:46

## 2024-01-01 RX ADMIN — INSULIN HUMAN 8 UNITS: 100 INJECTION, SUSPENSION SUBCUTANEOUS at 20:36

## 2024-01-01 RX ADMIN — HEPARIN SODIUM 5000 UNITS: 5000 INJECTION, SOLUTION INTRAVENOUS; SUBCUTANEOUS at 21:27

## 2024-01-01 RX ADMIN — HEPARIN SODIUM 5000 UNITS: 5000 INJECTION, SOLUTION INTRAVENOUS; SUBCUTANEOUS at 14:00

## 2024-01-01 RX ADMIN — QUETIAPINE FUMARATE 50 MG: 50 TABLET ORAL at 21:08

## 2024-01-01 RX ADMIN — PREDNISONE 50 MG: 50 TABLET ORAL at 10:10

## 2024-01-01 RX ADMIN — DEXMEDETOMIDINE HYDROCHLORIDE 1.1 MCG/KG/HR: 4 INJECTION, SOLUTION INTRAVENOUS at 13:38

## 2024-01-01 RX ADMIN — ACETAMINOPHEN 325 MG: 325 TABLET, FILM COATED ORAL at 04:32

## 2024-01-01 RX ADMIN — NOREPINEPHRINE BITARTRATE 0.12 MCG/KG/MIN: 1 SOLUTION INTRAVENOUS at 22:18

## 2024-01-01 RX ADMIN — AZITHROMYCIN 250 MG: 250 TABLET, FILM COATED ORAL at 11:50

## 2024-01-01 RX ADMIN — MEROPENEM 1 G: 1 INJECTION, POWDER, FOR SOLUTION INTRAVENOUS at 05:06

## 2024-01-01 RX ADMIN — Medication 1 PACKET: at 16:15

## 2024-01-01 RX ADMIN — CALCIUM CARBONATE (ANTACID) CHEW TAB 500 MG 500 MG: 500 CHEW TAB at 08:34

## 2024-01-01 RX ADMIN — CALCIUM CHLORIDE, MAGNESIUM CHLORIDE, SODIUM CHLORIDE, SODIUM BICARBONATE, POTASSIUM CHLORIDE AND SODIUM PHOSPHATE DIBASIC DIHYDRATE 12.5 ML/KG/HR: 3.68; 3.05; 6.34; 3.09; .314; .187 INJECTION INTRAVENOUS at 03:26

## 2024-01-01 RX ADMIN — LEVALBUTEROL HYDROCHLORIDE 0.63 MG: 0.63 SOLUTION RESPIRATORY (INHALATION) at 20:40

## 2024-01-01 RX ADMIN — LINEZOLID 600 MG: 600 TABLET, FILM COATED ORAL at 23:49

## 2024-01-01 RX ADMIN — CHLORHEXIDINE GLUCONATE 0.12% ORAL RINSE 15 ML: 1.2 LIQUID ORAL at 20:12

## 2024-01-01 RX ADMIN — DAPTOMYCIN 600 MG: 500 INJECTION, POWDER, LYOPHILIZED, FOR SOLUTION INTRAVENOUS at 14:16

## 2024-01-01 RX ADMIN — HYDROMORPHONE HYDROCHLORIDE 0.5 MG: 1 INJECTION, SOLUTION INTRAMUSCULAR; INTRAVENOUS; SUBCUTANEOUS at 03:09

## 2024-01-01 RX ADMIN — EPOETIN ALFA-EPBX 4000 UNITS: 10000 INJECTION, SOLUTION INTRAVENOUS; SUBCUTANEOUS at 15:03

## 2024-01-01 RX ADMIN — SODIUM CHLORIDE SOLN NEBU 3% 3 ML: 3 NEBU SOLN at 19:42

## 2024-01-01 RX ADMIN — HEPARIN SODIUM 500 UNITS: 1000 INJECTION INTRAVENOUS; SUBCUTANEOUS at 08:21

## 2024-01-01 RX ADMIN — TACROLIMUS 1 MG: 5 CAPSULE ORAL at 07:39

## 2024-01-01 RX ADMIN — HEPARIN SODIUM 5000 UNITS: 5000 INJECTION, SOLUTION INTRAVENOUS; SUBCUTANEOUS at 05:57

## 2024-01-01 RX ADMIN — QUETIAPINE FUMARATE 25 MG: 25 TABLET ORAL at 16:31

## 2024-01-01 RX ADMIN — LORAZEPAM 1 MG: 1 TABLET ORAL at 08:21

## 2024-01-01 RX ADMIN — Medication 1 PACKET: at 19:56

## 2024-01-01 RX ADMIN — ACETYLCYSTEINE 4 ML: 100 SOLUTION ORAL; RESPIRATORY (INHALATION) at 08:24

## 2024-01-01 RX ADMIN — ACETYLCYSTEINE 2 ML: 200 SOLUTION ORAL; RESPIRATORY (INHALATION) at 08:45

## 2024-01-01 RX ADMIN — Medication 50 MCG: at 04:03

## 2024-01-01 RX ADMIN — SULFAMETHOXAZOLE AND TRIMETHOPRIM 1 TABLET: 400; 80 TABLET ORAL at 21:39

## 2024-01-01 RX ADMIN — Medication 50 MCG: at 16:14

## 2024-01-01 RX ADMIN — MONTELUKAST 10 MG: 10 TABLET, FILM COATED ORAL at 21:57

## 2024-01-01 RX ADMIN — METOPROLOL TARTRATE 25 MG: 25 TABLET, FILM COATED ORAL at 21:12

## 2024-01-01 RX ADMIN — LORAZEPAM 1 MG: 2 LIQUID ORAL at 11:56

## 2024-01-01 RX ADMIN — CARVEDILOL 6.25 MG: 6.25 TABLET, FILM COATED ORAL at 21:00

## 2024-01-01 RX ADMIN — INSULIN HUMAN 8 UNITS: 100 INJECTION, SUSPENSION SUBCUTANEOUS at 20:45

## 2024-01-01 RX ADMIN — POSACONAZOLE 300 MG: 100 TABLET, DELAYED RELEASE ORAL at 12:08

## 2024-01-01 RX ADMIN — FENTANYL CITRATE 50 MCG: 50 INJECTION INTRAMUSCULAR; INTRAVENOUS at 08:42

## 2024-01-01 RX ADMIN — ACETAMINOPHEN 325 MG: 325 TABLET, FILM COATED ORAL at 11:11

## 2024-01-01 RX ADMIN — OXYCODONE HYDROCHLORIDE 5 MG: 5 TABLET ORAL at 17:15

## 2024-01-01 RX ADMIN — HYDROCORTISONE SODIUM SUCCINATE 50 MG: 100 INJECTION, POWDER, FOR SOLUTION INTRAMUSCULAR; INTRAVENOUS at 18:01

## 2024-01-01 RX ADMIN — MIDODRINE HYDROCHLORIDE 20 MG: 5 TABLET ORAL at 06:18

## 2024-01-01 RX ADMIN — HYDROCORTISONE SODIUM SUCCINATE 50 MG: 100 INJECTION, POWDER, FOR SOLUTION INTRAMUSCULAR; INTRAVENOUS at 10:30

## 2024-01-01 RX ADMIN — LEVALBUTEROL HYDROCHLORIDE 0.63 MG: 0.63 SOLUTION RESPIRATORY (INHALATION) at 12:24

## 2024-01-01 RX ADMIN — MICAFUNGIN SODIUM 150 MG: 50 INJECTION, POWDER, LYOPHILIZED, FOR SOLUTION INTRAVENOUS at 00:49

## 2024-01-01 RX ADMIN — MINOCYCLINE HYDROCHLORIDE 100 MG: 100 INJECTION INTRAVENOUS at 12:14

## 2024-01-01 RX ADMIN — Medication 1 TABLET: at 08:28

## 2024-01-01 RX ADMIN — Medication 1.8 UNITS/HR: at 04:32

## 2024-01-01 RX ADMIN — HYDROMORPHONE HYDROCHLORIDE 0.5 MG: 1 INJECTION, SOLUTION INTRAMUSCULAR; INTRAVENOUS; SUBCUTANEOUS at 14:10

## 2024-01-01 RX ADMIN — ACETAMINOPHEN 650 MG: 325 TABLET, FILM COATED ORAL at 06:00

## 2024-01-01 RX ADMIN — LORAZEPAM 1 MG: 1 TABLET ORAL at 11:37

## 2024-01-01 RX ADMIN — MAGNESIUM SULFATE HEPTAHYDRATE 1 G: 1 INJECTION, SOLUTION INTRAVENOUS at 21:28

## 2024-01-01 RX ADMIN — Medication 1 PACKET: at 20:13

## 2024-01-01 RX ADMIN — HEPARIN SODIUM 5000 UNITS: 5000 INJECTION, SOLUTION INTRAVENOUS; SUBCUTANEOUS at 21:53

## 2024-01-01 RX ADMIN — LORAZEPAM 0.5 MG: 2 LIQUID ORAL at 13:09

## 2024-01-01 RX ADMIN — ALBUTEROL SULFATE 2.5 MG: 2.5 SOLUTION RESPIRATORY (INHALATION) at 08:50

## 2024-01-01 RX ADMIN — LIDOCAINE HYDROCHLORIDE 0.5 ML: 10 INJECTION, SOLUTION EPIDURAL; INFILTRATION; INTRACAUDAL; PERINEURAL at 08:19

## 2024-01-01 RX ADMIN — ACETAMINOPHEN 975 MG: 325 TABLET, FILM COATED ORAL at 06:51

## 2024-01-01 RX ADMIN — QUETIAPINE FUMARATE 25 MG: 25 TABLET ORAL at 21:36

## 2024-01-01 RX ADMIN — SODIUM CHLORIDE SOLN NEBU 3% 3 ML: 3 NEBU SOLN at 21:01

## 2024-01-01 RX ADMIN — QUETIAPINE FUMARATE 150 MG: 50 TABLET ORAL at 20:41

## 2024-01-01 RX ADMIN — LORAZEPAM 0.5 MG: 2 LIQUID ORAL at 17:22

## 2024-01-01 RX ADMIN — MIDODRINE HYDROCHLORIDE 20 MG: 5 TABLET ORAL at 21:39

## 2024-01-01 RX ADMIN — Medication 1 TABLET: at 07:30

## 2024-01-01 RX ADMIN — HEPARIN SODIUM 5000 UNITS: 5000 INJECTION, SOLUTION INTRAVENOUS; SUBCUTANEOUS at 05:32

## 2024-01-01 RX ADMIN — ACETYLCYSTEINE 4 ML: 100 SOLUTION ORAL; RESPIRATORY (INHALATION) at 09:19

## 2024-01-01 RX ADMIN — PREDNISONE 5 MG: 5 TABLET ORAL at 07:30

## 2024-01-01 RX ADMIN — METOPROLOL TARTRATE 25 MG: 25 TABLET, FILM COATED ORAL at 06:15

## 2024-01-01 RX ADMIN — HYDROMORPHONE HYDROCHLORIDE 0.5 MG: 1 INJECTION, SOLUTION INTRAMUSCULAR; INTRAVENOUS; SUBCUTANEOUS at 04:19

## 2024-01-01 RX ADMIN — HEPARIN SODIUM 5000 UNITS: 5000 INJECTION, SOLUTION INTRAVENOUS; SUBCUTANEOUS at 05:51

## 2024-01-01 RX ADMIN — HYDROMORPHONE HYDROCHLORIDE 0.5 MG: 1 INJECTION, SOLUTION INTRAMUSCULAR; INTRAVENOUS; SUBCUTANEOUS at 21:06

## 2024-01-01 RX ADMIN — Medication 1 MG: at 18:55

## 2024-01-01 RX ADMIN — CEFTAZIDIME 2 G: 2 INJECTION, POWDER, FOR SOLUTION INTRAVENOUS at 04:34

## 2024-01-01 RX ADMIN — INSULIN ASPART 1 UNITS: 100 INJECTION, SOLUTION INTRAVENOUS; SUBCUTANEOUS at 00:57

## 2024-01-01 RX ADMIN — Medication 3 MG: at 19:38

## 2024-01-01 RX ADMIN — SODIUM CHLORIDE, POTASSIUM CHLORIDE, SODIUM LACTATE AND CALCIUM CHLORIDE 1000 ML: 600; 310; 30; 20 INJECTION, SOLUTION INTRAVENOUS at 23:47

## 2024-01-01 RX ADMIN — MONTELUKAST 10 MG: 10 TABLET, FILM COATED ORAL at 20:21

## 2024-01-01 RX ADMIN — LORAZEPAM 1 MG: 1 TABLET ORAL at 09:10

## 2024-01-01 RX ADMIN — LIDOCAINE HYDROCHLORIDE 0.5 ML: 10 INJECTION, SOLUTION EPIDURAL; INFILTRATION; INTRACAUDAL; PERINEURAL at 11:29

## 2024-01-01 RX ADMIN — MONTELUKAST 10 MG: 10 TABLET, FILM COATED ORAL at 22:07

## 2024-01-01 RX ADMIN — CEFTAZIDIME 2 G: 2 INJECTION, POWDER, FOR SOLUTION INTRAVENOUS at 13:39

## 2024-01-01 RX ADMIN — ANORECTAL OINTMENT: 15.7; .44; 24; 20.6 OINTMENT TOPICAL at 06:49

## 2024-01-01 RX ADMIN — FLUDROCORTISONE ACETATE 0.1 MG: 0.1 TABLET ORAL at 07:59

## 2024-01-01 RX ADMIN — Medication 50 MCG: at 08:29

## 2024-01-01 RX ADMIN — SODIUM CHLORIDE SOLN NEBU 3% 3 ML: 3 NEBU SOLN at 13:27

## 2024-01-01 RX ADMIN — LORAZEPAM 1 MG: 2 LIQUID ORAL at 19:09

## 2024-01-01 RX ADMIN — MONTELUKAST 10 MG: 10 TABLET, FILM COATED ORAL at 21:27

## 2024-01-01 RX ADMIN — EPOETIN ALFA-EPBX 4000 UNITS: 10000 INJECTION, SOLUTION INTRAVENOUS; SUBCUTANEOUS at 19:08

## 2024-01-01 RX ADMIN — PIPERACILLIN AND TAZOBACTAM 2.25 G: 2; .25 INJECTION, POWDER, FOR SOLUTION INTRAVENOUS at 23:47

## 2024-01-01 RX ADMIN — INSULIN ASPART 1 UNITS: 100 INJECTION, SOLUTION INTRAVENOUS; SUBCUTANEOUS at 08:23

## 2024-01-01 RX ADMIN — POSACONAZOLE 300 MG: 100 TABLET, DELAYED RELEASE ORAL at 12:59

## 2024-01-01 RX ADMIN — QUETIAPINE FUMARATE 50 MG: 50 TABLET ORAL at 21:25

## 2024-01-01 RX ADMIN — MIDODRINE HYDROCHLORIDE 10 MG: 5 TABLET ORAL at 13:58

## 2024-01-01 RX ADMIN — CALCIUM CHLORIDE, MAGNESIUM CHLORIDE, SODIUM CHLORIDE, SODIUM BICARBONATE, POTASSIUM CHLORIDE AND SODIUM PHOSPHATE DIBASIC DIHYDRATE 12.5 ML/KG/HR: 3.68; 3.05; 6.34; 3.09; .314; .187 INJECTION INTRAVENOUS at 06:21

## 2024-01-01 RX ADMIN — LORAZEPAM 1 MG: 2 LIQUID ORAL at 18:21

## 2024-01-01 RX ADMIN — LEVALBUTEROL HYDROCHLORIDE 0.63 MG: 0.63 SOLUTION RESPIRATORY (INHALATION) at 11:37

## 2024-01-01 RX ADMIN — FENTANYL CITRATE 50 MCG: 50 INJECTION INTRAMUSCULAR; INTRAVENOUS at 14:02

## 2024-01-01 RX ADMIN — SODIUM CHLORIDE SOLN NEBU 3% 3 ML: 3 NEBU SOLN at 11:29

## 2024-01-01 RX ADMIN — AZITHROMYCIN DIHYDRATE 250 MG: 250 TABLET ORAL at 10:08

## 2024-01-01 RX ADMIN — Medication 40 MG: at 11:09

## 2024-01-01 RX ADMIN — VALGANCICLOVIR HYDROCHLORIDE 450 MG: 50 POWDER, FOR SOLUTION ORAL at 08:12

## 2024-01-01 RX ADMIN — CHLORHEXIDINE GLUCONATE 0.12% ORAL RINSE 15 ML: 1.2 LIQUID ORAL at 08:12

## 2024-01-01 RX ADMIN — SODIUM CHLORIDE SOLN NEBU 3% 3 ML: 3 NEBU SOLN at 21:34

## 2024-01-01 RX ADMIN — LEVALBUTEROL HYDROCHLORIDE 0.63 MG: 0.63 SOLUTION RESPIRATORY (INHALATION) at 20:46

## 2024-01-01 RX ADMIN — Medication 40 MG: at 21:09

## 2024-01-01 RX ADMIN — Medication 3 MG: at 20:05

## 2024-01-01 RX ADMIN — RAMELTEON 8 MG: 8 TABLET ORAL at 23:41

## 2024-01-01 RX ADMIN — EPOETIN ALFA-EPBX 4000 UNITS: 10000 INJECTION, SOLUTION INTRAVENOUS; SUBCUTANEOUS at 10:24

## 2024-01-01 RX ADMIN — MICONAZOLE NITRATE: 20 POWDER TOPICAL at 08:58

## 2024-01-01 RX ADMIN — ACETYLCYSTEINE 4 ML: 100 SOLUTION ORAL; RESPIRATORY (INHALATION) at 20:57

## 2024-01-01 RX ADMIN — HYDROMORPHONE HYDROCHLORIDE 0.2 MG: 0.2 INJECTION, SOLUTION INTRAMUSCULAR; INTRAVENOUS; SUBCUTANEOUS at 13:09

## 2024-01-01 RX ADMIN — ACETYLCYSTEINE 4 ML: 100 SOLUTION ORAL; RESPIRATORY (INHALATION) at 08:51

## 2024-01-01 RX ADMIN — CALCIUM CHLORIDE, MAGNESIUM CHLORIDE, SODIUM CHLORIDE, SODIUM BICARBONATE, POTASSIUM CHLORIDE AND SODIUM PHOSPHATE DIBASIC DIHYDRATE 12.5 ML/KG/HR: 3.68; 3.05; 6.34; 3.09; .314; .187 INJECTION INTRAVENOUS at 20:46

## 2024-01-01 RX ADMIN — METOPROLOL TARTRATE 25 MG: 25 TABLET, FILM COATED ORAL at 18:41

## 2024-01-01 RX ADMIN — INSULIN HUMAN 8 UNITS: 100 INJECTION, SUSPENSION SUBCUTANEOUS at 07:42

## 2024-01-01 RX ADMIN — VALGANCICLOVIR HYDROCHLORIDE 450 MG: 50 POWDER, FOR SOLUTION ORAL at 08:49

## 2024-01-01 RX ADMIN — CHLORHEXIDINE GLUCONATE 0.12% ORAL RINSE 15 ML: 1.2 LIQUID ORAL at 20:36

## 2024-01-01 RX ADMIN — MONTELUKAST 10 MG: 10 TABLET, FILM COATED ORAL at 21:36

## 2024-01-01 RX ADMIN — Medication 0.5 MCG/KG/MIN: at 22:59

## 2024-01-01 RX ADMIN — LEVALBUTEROL HYDROCHLORIDE 0.63 MG: 0.63 SOLUTION RESPIRATORY (INHALATION) at 09:06

## 2024-01-01 RX ADMIN — ACETYLCYSTEINE 4 ML: 100 SOLUTION ORAL; RESPIRATORY (INHALATION) at 16:07

## 2024-01-01 RX ADMIN — MIDAZOLAM 1 MG: 1 INJECTION INTRAMUSCULAR; INTRAVENOUS at 09:11

## 2024-01-01 RX ADMIN — Medication 60 ML: at 13:01

## 2024-01-01 RX ADMIN — Medication 1 TABLET: at 07:38

## 2024-01-01 RX ADMIN — HYDROCORTISONE SODIUM SUCCINATE 50 MG: 100 INJECTION, POWDER, FOR SOLUTION INTRAMUSCULAR; INTRAVENOUS at 21:18

## 2024-01-01 RX ADMIN — AZITHROMYCIN 125 MG: 200 POWDER, FOR SUSPENSION PARENTERAL at 08:28

## 2024-01-01 RX ADMIN — CARVEDILOL 6.25 MG: 6.25 TABLET, FILM COATED ORAL at 09:05

## 2024-01-01 RX ADMIN — VALGANCICLOVIR HYDROCHLORIDE 450 MG: 50 POWDER, FOR SOLUTION ORAL at 08:43

## 2024-01-01 RX ADMIN — ACETYLCYSTEINE 4 ML: 100 SOLUTION ORAL; RESPIRATORY (INHALATION) at 07:34

## 2024-01-01 RX ADMIN — HEPARIN SODIUM 5000 UNITS: 5000 INJECTION, SOLUTION INTRAVENOUS; SUBCUTANEOUS at 06:45

## 2024-01-01 RX ADMIN — CARVEDILOL 6.25 MG: 6.25 TABLET, FILM COATED ORAL at 20:45

## 2024-01-01 RX ADMIN — Medication 1 PACKET: at 19:51

## 2024-01-01 RX ADMIN — Medication 5 ML: at 08:57

## 2024-01-01 RX ADMIN — POTASSIUM CHLORIDE 20 MEQ: 40 SOLUTION ORAL at 13:17

## 2024-01-01 RX ADMIN — CALCIUM CARBONATE (ANTACID) CHEW TAB 500 MG 500 MG: 500 CHEW TAB at 08:54

## 2024-01-01 RX ADMIN — CALCIUM CHLORIDE, MAGNESIUM CHLORIDE, SODIUM CHLORIDE, SODIUM BICARBONATE, POTASSIUM CHLORIDE AND SODIUM PHOSPHATE DIBASIC DIHYDRATE 12.5 ML/KG/HR: 3.68; 3.05; 6.34; 3.09; .314; .187 INJECTION INTRAVENOUS at 23:43

## 2024-01-01 RX ADMIN — LEVALBUTEROL HYDROCHLORIDE 0.63 MG: 0.63 SOLUTION RESPIRATORY (INHALATION) at 20:20

## 2024-01-01 RX ADMIN — PHENYLEPHRINE HYDROCHLORIDE 100 MCG: 10 INJECTION INTRAVENOUS at 14:07

## 2024-01-01 RX ADMIN — MONTELUKAST 10 MG: 10 TABLET, FILM COATED ORAL at 21:18

## 2024-01-01 RX ADMIN — MINOCYCLINE HYDROCHLORIDE 100 MG: 100 INJECTION INTRAVENOUS at 12:22

## 2024-01-01 RX ADMIN — PHENYLEPHRINE HYDROCHLORIDE 100 MCG: 10 INJECTION INTRAVENOUS at 22:30

## 2024-01-01 RX ADMIN — ACETAMINOPHEN 650 MG: 325 TABLET, FILM COATED ORAL at 05:33

## 2024-01-01 RX ADMIN — HEPARIN SODIUM 5000 UNITS: 5000 INJECTION, SOLUTION INTRAVENOUS; SUBCUTANEOUS at 21:16

## 2024-01-01 RX ADMIN — MINOCYCLINE HYDROCHLORIDE 100 MG: 100 INJECTION INTRAVENOUS at 23:08

## 2024-01-01 RX ADMIN — VALGANCICLOVIR HYDROCHLORIDE 450 MG: 50 POWDER, FOR SOLUTION ORAL at 20:15

## 2024-01-01 RX ADMIN — TACROLIMUS 0.7 MG: 5 CAPSULE ORAL at 20:45

## 2024-01-01 RX ADMIN — INSULIN HUMAN 8 UNITS: 100 INJECTION, SUSPENSION SUBCUTANEOUS at 08:34

## 2024-01-01 RX ADMIN — Medication 1 PACKET: at 20:29

## 2024-01-01 RX ADMIN — ACETAMINOPHEN 975 MG: 325 TABLET, FILM COATED ORAL at 22:01

## 2024-01-01 RX ADMIN — PREDNISONE 40 MG: 20 TABLET ORAL at 08:19

## 2024-01-01 RX ADMIN — CHLORHEXIDINE GLUCONATE 0.12% ORAL RINSE 15 ML: 1.2 LIQUID ORAL at 20:26

## 2024-01-01 RX ADMIN — PROPOFOL 15 MCG/KG/MIN: 10 INJECTION, EMULSION INTRAVENOUS at 22:11

## 2024-01-01 RX ADMIN — INSULIN HUMAN 8 UNITS: 100 INJECTION, SUSPENSION SUBCUTANEOUS at 20:06

## 2024-01-01 RX ADMIN — SODIUM CHLORIDE 300 ML: 9 INJECTION, SOLUTION INTRAVENOUS at 15:02

## 2024-01-01 RX ADMIN — NOREPINEPHRINE BITARTRATE 0.03 MCG/KG/MIN: 1 SOLUTION INTRAVENOUS at 06:35

## 2024-01-01 RX ADMIN — MIDODRINE HYDROCHLORIDE 15 MG: 5 TABLET ORAL at 22:18

## 2024-01-01 RX ADMIN — DEXMEDETOMIDINE HYDROCHLORIDE 0.9 MCG/KG/HR: 4 INJECTION, SOLUTION INTRAVENOUS at 09:50

## 2024-01-01 RX ADMIN — POSACONAZOLE 300 MG: 100 TABLET, DELAYED RELEASE ORAL at 13:28

## 2024-01-01 RX ADMIN — Medication 5 ML: at 11:09

## 2024-01-01 RX ADMIN — DEXMEDETOMIDINE HYDROCHLORIDE 1.2 MCG/KG/HR: 4 INJECTION, SOLUTION INTRAVENOUS at 21:17

## 2024-01-01 RX ADMIN — ACETYLCYSTEINE 2 ML: 200 SOLUTION ORAL; RESPIRATORY (INHALATION) at 08:08

## 2024-01-01 RX ADMIN — HYDROCORTISONE SODIUM SUCCINATE 25 MG: 100 INJECTION, POWDER, FOR SOLUTION INTRAMUSCULAR; INTRAVENOUS at 20:28

## 2024-01-01 RX ADMIN — LEVALBUTEROL HYDROCHLORIDE 0.63 MG: 0.63 SOLUTION RESPIRATORY (INHALATION) at 08:18

## 2024-01-01 RX ADMIN — MONTELUKAST 10 MG: 10 TABLET, FILM COATED ORAL at 21:28

## 2024-01-01 RX ADMIN — ACETAMINOPHEN 325 MG: 325 TABLET, FILM COATED ORAL at 12:36

## 2024-01-01 RX ADMIN — LORAZEPAM 0.5 MG: 2 LIQUID ORAL at 13:28

## 2024-01-01 RX ADMIN — CHLORHEXIDINE GLUCONATE 0.12% ORAL RINSE 15 ML: 1.2 LIQUID ORAL at 00:09

## 2024-01-01 RX ADMIN — CARVEDILOL 6.25 MG: 6.25 TABLET, FILM COATED ORAL at 08:44

## 2024-01-01 RX ADMIN — OXYCODONE HYDROCHLORIDE 5 MG: 100 SOLUTION ORAL at 23:38

## 2024-01-01 RX ADMIN — DEXMEDETOMIDINE HYDROCHLORIDE 1.2 MCG/KG/HR: 4 INJECTION, SOLUTION INTRAVENOUS at 02:49

## 2024-01-01 RX ADMIN — Medication 3 MG: at 20:18

## 2024-01-01 RX ADMIN — INSULIN HUMAN 8 UNITS: 100 INJECTION, SUSPENSION SUBCUTANEOUS at 08:52

## 2024-01-01 RX ADMIN — SODIUM CHLORIDE SOLN NEBU 3% 3 ML: 3 NEBU SOLN at 21:23

## 2024-01-01 RX ADMIN — Medication 1 PACKET: at 08:26

## 2024-01-01 RX ADMIN — Medication 1 PACKET: at 17:15

## 2024-01-01 RX ADMIN — LEVALBUTEROL HYDROCHLORIDE 0.63 MG: 0.63 SOLUTION RESPIRATORY (INHALATION) at 11:38

## 2024-01-01 RX ADMIN — QUETIAPINE FUMARATE 25 MG: 25 TABLET ORAL at 23:08

## 2024-01-01 RX ADMIN — Medication 1 PACKET: at 20:19

## 2024-01-01 RX ADMIN — METOPROLOL TARTRATE 25 MG: 25 TABLET, FILM COATED ORAL at 20:20

## 2024-01-01 RX ADMIN — QUETIAPINE FUMARATE 25 MG: 25 TABLET ORAL at 13:06

## 2024-01-01 RX ADMIN — SODIUM CHLORIDE SOLN NEBU 3% 3 ML: 3 NEBU SOLN at 12:09

## 2024-01-01 RX ADMIN — METOPROLOL TARTRATE 25 MG: 25 TABLET, FILM COATED ORAL at 08:25

## 2024-01-01 RX ADMIN — CHLORHEXIDINE GLUCONATE 0.12% ORAL RINSE 15 ML: 1.2 LIQUID ORAL at 08:26

## 2024-01-01 RX ADMIN — HYDROMORPHONE HYDROCHLORIDE 0.5 MG: 1 INJECTION, SOLUTION INTRAMUSCULAR; INTRAVENOUS; SUBCUTANEOUS at 09:06

## 2024-01-01 RX ADMIN — CHLORHEXIDINE GLUCONATE 0.12% ORAL RINSE 15 ML: 1.2 LIQUID ORAL at 19:07

## 2024-01-01 RX ADMIN — CHLORHEXIDINE GLUCONATE 0.12% ORAL RINSE 15 ML: 1.2 LIQUID ORAL at 20:00

## 2024-01-01 RX ADMIN — ACETAMINOPHEN 325 MG: 325 TABLET, FILM COATED ORAL at 20:20

## 2024-01-01 RX ADMIN — Medication 1 PACKET: at 08:23

## 2024-01-01 RX ADMIN — HEPARIN SODIUM 5000 UNITS: 5000 INJECTION, SOLUTION INTRAVENOUS; SUBCUTANEOUS at 21:41

## 2024-01-01 RX ADMIN — SODIUM CHLORIDE SOLN NEBU 3% 3 ML: 3 NEBU SOLN at 13:38

## 2024-01-01 RX ADMIN — SODIUM CHLORIDE 250 ML: 9 INJECTION, SOLUTION INTRAVENOUS at 11:31

## 2024-01-01 RX ADMIN — ACETAMINOPHEN 325 MG: 325 TABLET, FILM COATED ORAL at 20:29

## 2024-01-01 RX ADMIN — TACROLIMUS 0.7 MG: 5 CAPSULE ORAL at 17:53

## 2024-01-01 RX ADMIN — Medication 5 ML: at 08:00

## 2024-01-01 RX ADMIN — HEPARIN SODIUM 5000 UNITS: 5000 INJECTION, SOLUTION INTRAVENOUS; SUBCUTANEOUS at 14:10

## 2024-01-01 RX ADMIN — HEPARIN SODIUM 500 UNITS/HR: 1000 INJECTION INTRAVENOUS; SUBCUTANEOUS at 02:07

## 2024-01-01 RX ADMIN — INSULIN HUMAN 8 UNITS: 100 INJECTION, SUSPENSION SUBCUTANEOUS at 19:36

## 2024-01-01 RX ADMIN — TACROLIMUS 0.5 MG: 5 CAPSULE ORAL at 17:45

## 2024-01-01 RX ADMIN — CHLORHEXIDINE GLUCONATE 0.12% ORAL RINSE 15 ML: 1.2 LIQUID ORAL at 08:17

## 2024-01-01 RX ADMIN — SODIUM CHLORIDE SOLN NEBU 3% 3 ML: 3 NEBU SOLN at 12:23

## 2024-01-01 RX ADMIN — Medication 1 PACKET: at 01:11

## 2024-01-01 RX ADMIN — CHLORHEXIDINE GLUCONATE 0.12% ORAL RINSE 15 ML: 1.2 LIQUID ORAL at 20:57

## 2024-01-01 RX ADMIN — SODIUM CHLORIDE 250 ML: 9 INJECTION, SOLUTION INTRAVENOUS at 08:20

## 2024-01-01 RX ADMIN — ACETAMINOPHEN 325 MG: 325 TABLET, FILM COATED ORAL at 08:26

## 2024-01-01 RX ADMIN — ACETYLCYSTEINE 4 ML: 100 SOLUTION ORAL; RESPIRATORY (INHALATION) at 16:55

## 2024-01-01 RX ADMIN — HEPARIN SODIUM 5000 UNITS: 5000 INJECTION, SOLUTION INTRAVENOUS; SUBCUTANEOUS at 20:52

## 2024-01-01 RX ADMIN — CHLORHEXIDINE GLUCONATE 0.12% ORAL RINSE 15 ML: 1.2 LIQUID ORAL at 08:47

## 2024-01-01 RX ADMIN — PROPOFOL 40 MG: 10 INJECTION, EMULSION INTRAVENOUS at 13:52

## 2024-01-01 RX ADMIN — FENTANYL CITRATE 50 MCG: 50 INJECTION INTRAMUSCULAR; INTRAVENOUS at 23:20

## 2024-01-01 RX ADMIN — FLUDROCORTISONE ACETATE 0.1 MG: 0.1 TABLET ORAL at 08:01

## 2024-01-01 RX ADMIN — SULFAMETHOXAZOLE AND TRIMETHOPRIM 80 MG: 200; 40 SUSPENSION ORAL at 20:16

## 2024-01-01 RX ADMIN — Medication 40 MG: at 21:12

## 2024-01-01 RX ADMIN — INSULIN ASPART 3 UNITS: 100 INJECTION, SOLUTION INTRAVENOUS; SUBCUTANEOUS at 19:00

## 2024-01-01 RX ADMIN — INSULIN ASPART 4 UNITS: 100 INJECTION, SOLUTION INTRAVENOUS; SUBCUTANEOUS at 14:05

## 2024-01-01 RX ADMIN — TACROLIMUS 0.6 MG: 5 CAPSULE ORAL at 18:05

## 2024-01-01 RX ADMIN — TACROLIMUS 0.8 MG: 5 CAPSULE ORAL at 20:56

## 2024-01-01 RX ADMIN — INSULIN ASPART 2 UNITS: 100 INJECTION, SOLUTION INTRAVENOUS; SUBCUTANEOUS at 16:45

## 2024-01-01 RX ADMIN — LEVALBUTEROL HYDROCHLORIDE 0.63 MG: 0.63 SOLUTION RESPIRATORY (INHALATION) at 12:59

## 2024-01-01 RX ADMIN — LEVALBUTEROL HYDROCHLORIDE 0.63 MG: 0.63 SOLUTION RESPIRATORY (INHALATION) at 11:21

## 2024-01-01 RX ADMIN — Medication 25 MG: at 23:42

## 2024-01-01 RX ADMIN — HEPARIN SODIUM 5000 UNITS: 5000 INJECTION, SOLUTION INTRAVENOUS; SUBCUTANEOUS at 21:28

## 2024-01-01 RX ADMIN — POSACONAZOLE 300 MG: 100 TABLET, DELAYED RELEASE ORAL at 12:43

## 2024-01-01 RX ADMIN — PREDNISONE 50 MG: 50 TABLET ORAL at 09:17

## 2024-01-01 RX ADMIN — MONTELUKAST 10 MG: 10 TABLET, FILM COATED ORAL at 22:01

## 2024-01-01 RX ADMIN — LORAZEPAM 1 MG: 1 TABLET ORAL at 00:14

## 2024-01-01 RX ADMIN — PHENYLEPHRINE HYDROCHLORIDE 100 MCG: 10 INJECTION INTRAVENOUS at 14:12

## 2024-01-01 RX ADMIN — MINOCYCLINE HYDROCHLORIDE 100 MG: 100 INJECTION INTRAVENOUS at 12:11

## 2024-01-01 RX ADMIN — VALGANCICLOVIR HYDROCHLORIDE 450 MG: 50 POWDER, FOR SOLUTION ORAL at 08:24

## 2024-01-01 RX ADMIN — RAMELTEON 8 MG: 8 TABLET ORAL at 21:00

## 2024-01-01 RX ADMIN — HYDROCORTISONE SODIUM SUCCINATE 25 MG: 100 INJECTION, POWDER, FOR SOLUTION INTRAMUSCULAR; INTRAVENOUS at 08:27

## 2024-01-01 RX ADMIN — Medication 1 MG: at 17:51

## 2024-01-01 RX ADMIN — TACROLIMUS 0.6 MG: 5 CAPSULE ORAL at 18:00

## 2024-01-01 RX ADMIN — HEPARIN SODIUM 5000 UNITS: 5000 INJECTION, SOLUTION INTRAVENOUS; SUBCUTANEOUS at 14:04

## 2024-01-01 RX ADMIN — LEVALBUTEROL HYDROCHLORIDE 0.63 MG: 0.63 SOLUTION RESPIRATORY (INHALATION) at 16:20

## 2024-01-01 RX ADMIN — HEPARIN SODIUM 500 UNITS/HR: 1000 INJECTION INTRAVENOUS; SUBCUTANEOUS at 08:21

## 2024-01-01 RX ADMIN — INSULIN ASPART 1 UNITS: 100 INJECTION, SOLUTION INTRAVENOUS; SUBCUTANEOUS at 04:10

## 2024-01-01 RX ADMIN — CHLORHEXIDINE GLUCONATE 0.12% ORAL RINSE 15 ML: 1.2 LIQUID ORAL at 08:50

## 2024-01-01 RX ADMIN — LEVALBUTEROL HYDROCHLORIDE 0.63 MG: 0.63 SOLUTION RESPIRATORY (INHALATION) at 09:02

## 2024-01-01 RX ADMIN — HEPARIN SODIUM 5000 UNITS: 5000 INJECTION, SOLUTION INTRAVENOUS; SUBCUTANEOUS at 13:02

## 2024-01-01 RX ADMIN — ACETYLCYSTEINE 4 ML: 100 SOLUTION ORAL; RESPIRATORY (INHALATION) at 16:40

## 2024-01-01 RX ADMIN — HEPARIN SODIUM 5000 UNITS: 5000 INJECTION, SOLUTION INTRAVENOUS; SUBCUTANEOUS at 19:55

## 2024-01-01 RX ADMIN — CALCIUM CHLORIDE, MAGNESIUM CHLORIDE, SODIUM CHLORIDE, SODIUM BICARBONATE, POTASSIUM CHLORIDE AND SODIUM PHOSPHATE DIBASIC DIHYDRATE 12.5 ML/KG/HR: 3.68; 3.05; 6.34; 3.09; .314; .187 INJECTION INTRAVENOUS at 17:34

## 2024-01-01 RX ADMIN — QUETIAPINE FUMARATE 50 MG: 50 TABLET ORAL at 18:48

## 2024-01-01 RX ADMIN — Medication 10 MG: at 12:53

## 2024-01-01 RX ADMIN — MONTELUKAST 10 MG: 10 TABLET, FILM COATED ORAL at 20:45

## 2024-01-01 RX ADMIN — PREDNISONE 50 MG: 50 TABLET ORAL at 08:51

## 2024-01-01 RX ADMIN — ACETAMINOPHEN 325 MG: 325 TABLET, FILM COATED ORAL at 00:10

## 2024-01-01 RX ADMIN — SODIUM CHLORIDE SOLN NEBU 3% 3 ML: 3 NEBU SOLN at 12:36

## 2024-01-01 RX ADMIN — POTASSIUM CHLORIDE 20 MEQ: 1.5 POWDER, FOR SOLUTION ORAL at 05:56

## 2024-01-01 RX ADMIN — POSACONAZOLE 300 MG: 100 TABLET, DELAYED RELEASE ORAL at 12:04

## 2024-01-01 RX ADMIN — HEPARIN SODIUM 5000 UNITS: 5000 INJECTION, SOLUTION INTRAVENOUS; SUBCUTANEOUS at 14:57

## 2024-01-01 RX ADMIN — CHLORHEXIDINE GLUCONATE 0.12% ORAL RINSE 15 ML: 1.2 LIQUID ORAL at 08:01

## 2024-01-01 RX ADMIN — INSULIN HUMAN 8 UNITS: 100 INJECTION, SUSPENSION SUBCUTANEOUS at 08:18

## 2024-01-01 RX ADMIN — ACETYLCYSTEINE 4 ML: 100 SOLUTION ORAL; RESPIRATORY (INHALATION) at 16:45

## 2024-01-01 RX ADMIN — HEPARIN SODIUM 500 UNITS/HR: 1000 INJECTION INTRAVENOUS; SUBCUTANEOUS at 13:56

## 2024-01-01 RX ADMIN — HEPARIN SODIUM 500 UNITS/HR: 1000 INJECTION INTRAVENOUS; SUBCUTANEOUS at 19:01

## 2024-01-01 RX ADMIN — INSULIN HUMAN 5 UNITS: 100 INJECTION, SUSPENSION SUBCUTANEOUS at 20:17

## 2024-01-01 RX ADMIN — INSULIN GLARGINE 5 UNITS: 100 INJECTION, SOLUTION SUBCUTANEOUS at 17:51

## 2024-01-01 RX ADMIN — DEXMEDETOMIDINE HYDROCHLORIDE 0.5 MCG/KG/HR: 4 INJECTION, SOLUTION INTRAVENOUS at 05:47

## 2024-01-01 RX ADMIN — AZITHROMYCIN 125 MG: 200 POWDER, FOR SUSPENSION PARENTERAL at 11:09

## 2024-01-01 RX ADMIN — Medication 1 PACKET: at 02:07

## 2024-01-01 RX ADMIN — TACROLIMUS 0.6 MG: 5 CAPSULE ORAL at 08:22

## 2024-01-01 RX ADMIN — CALCIUM CHLORIDE, MAGNESIUM CHLORIDE, SODIUM CHLORIDE, SODIUM BICARBONATE, POTASSIUM CHLORIDE AND SODIUM PHOSPHATE DIBASIC DIHYDRATE 12.5 ML/KG/HR: 3.68; 3.05; 6.34; 3.09; .314; .187 INJECTION INTRAVENOUS at 20:56

## 2024-01-01 RX ADMIN — LORAZEPAM 1 MG: 1 TABLET ORAL at 12:43

## 2024-01-01 RX ADMIN — QUETIAPINE FUMARATE 25 MG: 25 TABLET ORAL at 14:35

## 2024-01-01 RX ADMIN — Medication 1 MG: at 17:47

## 2024-01-01 RX ADMIN — Medication 2.4 UNITS/HR: at 17:29

## 2024-01-01 RX ADMIN — AZITHROMYCIN DIHYDRATE 250 MG: 250 TABLET ORAL at 08:10

## 2024-01-01 RX ADMIN — POSACONAZOLE 300 MG: 100 TABLET, DELAYED RELEASE ORAL at 13:01

## 2024-01-01 RX ADMIN — HEPARIN SODIUM 5000 UNITS: 5000 INJECTION, SOLUTION INTRAVENOUS; SUBCUTANEOUS at 22:01

## 2024-01-01 RX ADMIN — CALCIUM CHLORIDE, MAGNESIUM CHLORIDE, SODIUM CHLORIDE, SODIUM BICARBONATE, POTASSIUM CHLORIDE AND SODIUM PHOSPHATE DIBASIC DIHYDRATE 12.5 ML/KG/HR: 3.68; 3.05; 6.34; 3.09; .314; .187 INJECTION INTRAVENOUS at 21:07

## 2024-01-01 RX ADMIN — ESCITALOPRAM 10 MG: 5 SOLUTION ORAL at 11:09

## 2024-01-01 RX ADMIN — TACROLIMUS 0.8 MG: 5 CAPSULE ORAL at 07:28

## 2024-01-01 RX ADMIN — HEPARIN SODIUM 5000 UNITS: 5000 INJECTION, SOLUTION INTRAVENOUS; SUBCUTANEOUS at 13:06

## 2024-01-01 RX ADMIN — LORAZEPAM 1 MG: 1 TABLET ORAL at 04:56

## 2024-01-01 RX ADMIN — CALCIUM CARBONATE (ANTACID) CHEW TAB 500 MG 500 MG: 500 CHEW TAB at 07:30

## 2024-01-01 RX ADMIN — CHLORHEXIDINE GLUCONATE 0.12% ORAL RINSE 15 ML: 1.2 LIQUID ORAL at 08:19

## 2024-01-01 RX ADMIN — EPOETIN ALFA-EPBX 4000 UNITS: 10000 INJECTION, SOLUTION INTRAVENOUS; SUBCUTANEOUS at 19:12

## 2024-01-01 RX ADMIN — VALGANCICLOVIR HYDROCHLORIDE 450 MG: 50 POWDER, FOR SOLUTION ORAL at 08:11

## 2024-01-01 RX ADMIN — LORAZEPAM 1 MG: 2 INJECTION INTRAMUSCULAR; INTRAVENOUS at 13:24

## 2024-01-01 RX ADMIN — FENTANYL CITRATE 50 MCG: 50 INJECTION INTRAMUSCULAR; INTRAVENOUS at 07:52

## 2024-01-01 RX ADMIN — ACETAMINOPHEN 325 MG: 325 TABLET, FILM COATED ORAL at 21:12

## 2024-01-01 RX ADMIN — LEVOFLOXACIN 500 MG: 500 INJECTION, SOLUTION INTRAVENOUS at 16:04

## 2024-01-01 RX ADMIN — TACROLIMUS 0.8 MG: 5 CAPSULE ORAL at 08:28

## 2024-01-01 RX ADMIN — Medication 1 TABLET: at 08:03

## 2024-01-01 RX ADMIN — INSULIN HUMAN 8 UNITS: 100 INJECTION, SUSPENSION SUBCUTANEOUS at 08:17

## 2024-01-01 RX ADMIN — INSULIN ASPART 1 UNITS: 100 INJECTION, SOLUTION INTRAVENOUS; SUBCUTANEOUS at 16:14

## 2024-01-01 RX ADMIN — HEPARIN SODIUM 5000 UNITS: 5000 INJECTION, SOLUTION INTRAVENOUS; SUBCUTANEOUS at 05:45

## 2024-01-01 ASSESSMENT — ACTIVITIES OF DAILY LIVING (ADL)
ADLS_ACUITY_SCORE: 48
ADLS_ACUITY_SCORE: 56
ADLS_ACUITY_SCORE: 59
ADLS_ACUITY_SCORE: 58
ADLS_ACUITY_SCORE: 56
ADLS_ACUITY_SCORE: 53
ADLS_ACUITY_SCORE: 57
ADLS_ACUITY_SCORE: 48
ADLS_ACUITY_SCORE: 52
ADLS_ACUITY_SCORE: 57
ADLS_ACUITY_SCORE: 56
ADLS_ACUITY_SCORE: 61
ADLS_ACUITY_SCORE: 57
ADLS_ACUITY_SCORE: 68
ADLS_ACUITY_SCORE: 62
ADLS_ACUITY_SCORE: 60
ADLS_ACUITY_SCORE: 64
ADLS_ACUITY_SCORE: 52
ADLS_ACUITY_SCORE: 57
ADLS_ACUITY_SCORE: 52
ADLS_ACUITY_SCORE: 51
ADLS_ACUITY_SCORE: 57
ADLS_ACUITY_SCORE: 48
ADLS_ACUITY_SCORE: 52
ADLS_ACUITY_SCORE: 62
ADLS_ACUITY_SCORE: 61
ADLS_ACUITY_SCORE: 63
ADLS_ACUITY_SCORE: 62
ADLS_ACUITY_SCORE: 62
ADLS_ACUITY_SCORE: 52
ADLS_ACUITY_SCORE: 41
ADLS_ACUITY_SCORE: 55
ADLS_ACUITY_SCORE: 57
ADLS_ACUITY_SCORE: 61
ADLS_ACUITY_SCORE: 59
ADLS_ACUITY_SCORE: 53
ADLS_ACUITY_SCORE: 52
ADLS_ACUITY_SCORE: 61
ADLS_ACUITY_SCORE: 62
ADLS_ACUITY_SCORE: 61
ADLS_ACUITY_SCORE: 62
ADLS_ACUITY_SCORE: 64
ADLS_ACUITY_SCORE: 68
ADLS_ACUITY_SCORE: 52
ADLS_ACUITY_SCORE: 53
ADLS_ACUITY_SCORE: 51
ADLS_ACUITY_SCORE: 51
ADLS_ACUITY_SCORE: 52
ADLS_ACUITY_SCORE: 58
ADLS_ACUITY_SCORE: 52
ADLS_ACUITY_SCORE: 58
ADLS_ACUITY_SCORE: 53
ADLS_ACUITY_SCORE: 52
ADLS_ACUITY_SCORE: 52
ADLS_ACUITY_SCORE: 53
ADLS_ACUITY_SCORE: 46
ADLS_ACUITY_SCORE: 44
ADLS_ACUITY_SCORE: 53
ADLS_ACUITY_SCORE: 68
ADLS_ACUITY_SCORE: 63
ADLS_ACUITY_SCORE: 56
ADLS_ACUITY_SCORE: 53
ADLS_ACUITY_SCORE: 52
ADLS_ACUITY_SCORE: 62
ADLS_ACUITY_SCORE: 57
ADLS_ACUITY_SCORE: 57
ADLS_ACUITY_SCORE: 52
ADLS_ACUITY_SCORE: 55
ADLS_ACUITY_SCORE: 53
ADLS_ACUITY_SCORE: 52
ADLS_ACUITY_SCORE: 58
ADLS_ACUITY_SCORE: 47
ADLS_ACUITY_SCORE: 68
ADLS_ACUITY_SCORE: 40
ADLS_ACUITY_SCORE: 52
ADLS_ACUITY_SCORE: 51
ADLS_ACUITY_SCORE: 53
ADLS_ACUITY_SCORE: 61
ADLS_ACUITY_SCORE: 51
ADLS_ACUITY_SCORE: 41
ADLS_ACUITY_SCORE: 62
ADLS_ACUITY_SCORE: 62
ADLS_ACUITY_SCORE: 57
ADLS_ACUITY_SCORE: 55
ADLS_ACUITY_SCORE: 52
ADLS_ACUITY_SCORE: 52
ADLS_ACUITY_SCORE: 57
ADLS_ACUITY_SCORE: 48
ADLS_ACUITY_SCORE: 61
ADLS_ACUITY_SCORE: 65
ADLS_ACUITY_SCORE: 48
ADLS_ACUITY_SCORE: 56
ADLS_ACUITY_SCORE: 53
ADLS_ACUITY_SCORE: 52
ADLS_ACUITY_SCORE: 58
ADLS_ACUITY_SCORE: 56
ADLS_ACUITY_SCORE: 41
ADLS_ACUITY_SCORE: 52
ADLS_ACUITY_SCORE: 48
ADLS_ACUITY_SCORE: 41
ADLS_ACUITY_SCORE: 52
ADLS_ACUITY_SCORE: 61
ADLS_ACUITY_SCORE: 68
ADLS_ACUITY_SCORE: 52
ADLS_ACUITY_SCORE: 61
ADLS_ACUITY_SCORE: 52
ADLS_ACUITY_SCORE: 58
ADLS_ACUITY_SCORE: 41
ADLS_ACUITY_SCORE: 54
ADLS_ACUITY_SCORE: 49
ADLS_ACUITY_SCORE: 64
ADLS_ACUITY_SCORE: 55
ADLS_ACUITY_SCORE: 41
ADLS_ACUITY_SCORE: 62
ADLS_ACUITY_SCORE: 52
ADLS_ACUITY_SCORE: 55
ADLS_ACUITY_SCORE: 52
ADLS_ACUITY_SCORE: 57
ADLS_ACUITY_SCORE: 53
ADLS_ACUITY_SCORE: 51
ADLS_ACUITY_SCORE: 51
ADLS_ACUITY_SCORE: 61
ADLS_ACUITY_SCORE: 56
ADLS_ACUITY_SCORE: 57
ADLS_ACUITY_SCORE: 52
ADLS_ACUITY_SCORE: 61
ADLS_ACUITY_SCORE: 61
ADLS_ACUITY_SCORE: 53
ADLS_ACUITY_SCORE: 51
ADLS_ACUITY_SCORE: 53
ADLS_ACUITY_SCORE: 52
ADLS_ACUITY_SCORE: 51
ADLS_ACUITY_SCORE: 48
ADLS_ACUITY_SCORE: 52
ADLS_ACUITY_SCORE: 57
ADLS_ACUITY_SCORE: 53
ADLS_ACUITY_SCORE: 62
ADLS_ACUITY_SCORE: 52
ADLS_ACUITY_SCORE: 61
ADLS_ACUITY_SCORE: 66
ADLS_ACUITY_SCORE: 56
ADLS_ACUITY_SCORE: 53
ADLS_ACUITY_SCORE: 52
ADLS_ACUITY_SCORE: 53
ADLS_ACUITY_SCORE: 52
ADLS_ACUITY_SCORE: 41
ADLS_ACUITY_SCORE: 56
ADLS_ACUITY_SCORE: 56
ADLS_ACUITY_SCORE: 62
ADLS_ACUITY_SCORE: 60
ADLS_ACUITY_SCORE: 56
ADLS_ACUITY_SCORE: 62
ADLS_ACUITY_SCORE: 62
ADLS_ACUITY_SCORE: 61
ADLS_ACUITY_SCORE: 61
ADLS_ACUITY_SCORE: 51
ADLS_ACUITY_SCORE: 52
ADLS_ACUITY_SCORE: 52
ADLS_ACUITY_SCORE: 53
ADLS_ACUITY_SCORE: 53
ADLS_ACUITY_SCORE: 64
ADLS_ACUITY_SCORE: 51
ADLS_ACUITY_SCORE: 53
ADLS_ACUITY_SCORE: 56
ADLS_ACUITY_SCORE: 50
ADLS_ACUITY_SCORE: 62
ADLS_ACUITY_SCORE: 53
ADLS_ACUITY_SCORE: 62
ADLS_ACUITY_SCORE: 57
ADLS_ACUITY_SCORE: 50
ADLS_ACUITY_SCORE: 57
ADLS_ACUITY_SCORE: 61
ADLS_ACUITY_SCORE: 56
ADLS_ACUITY_SCORE: 52
ADLS_ACUITY_SCORE: 51
ADLS_ACUITY_SCORE: 56
ADLS_ACUITY_SCORE: 55
ADLS_ACUITY_SCORE: 52
ADLS_ACUITY_SCORE: 43
ADLS_ACUITY_SCORE: 57
ADLS_ACUITY_SCORE: 41
ADLS_ACUITY_SCORE: 65
ADLS_ACUITY_SCORE: 43
ADLS_ACUITY_SCORE: 62
ADLS_ACUITY_SCORE: 52
ADLS_ACUITY_SCORE: 61
ADLS_ACUITY_SCORE: 61
ADLS_ACUITY_SCORE: 62
ADLS_ACUITY_SCORE: 51
ADLS_ACUITY_SCORE: 41
ADLS_ACUITY_SCORE: 62
ADLS_ACUITY_SCORE: 53
ADLS_ACUITY_SCORE: 56
ADLS_ACUITY_SCORE: 52
ADLS_ACUITY_SCORE: 52
ADLS_ACUITY_SCORE: 41
ADLS_ACUITY_SCORE: 41
ADLS_ACUITY_SCORE: 52
ADLS_ACUITY_SCORE: 66
ADLS_ACUITY_SCORE: 53
ADLS_ACUITY_SCORE: 53
ADLS_ACUITY_SCORE: 66
ADLS_ACUITY_SCORE: 59
ADLS_ACUITY_SCORE: 48
ADLS_ACUITY_SCORE: 46
ADLS_ACUITY_SCORE: 68
ADLS_ACUITY_SCORE: 51
ADLS_ACUITY_SCORE: 62
ADLS_ACUITY_SCORE: 48
ADLS_ACUITY_SCORE: 52
ADLS_ACUITY_SCORE: 56
ADLS_ACUITY_SCORE: 57
ADLS_ACUITY_SCORE: 51
ADLS_ACUITY_SCORE: 53
ADLS_ACUITY_SCORE: 53
ADLS_ACUITY_SCORE: 61
ADLS_ACUITY_SCORE: 62
ADLS_ACUITY_SCORE: 55
ADLS_ACUITY_SCORE: 52
ADLS_ACUITY_SCORE: 56
ADLS_ACUITY_SCORE: 62
ADLS_ACUITY_SCORE: 52
ADLS_ACUITY_SCORE: 60
ADLS_ACUITY_SCORE: 52
ADLS_ACUITY_SCORE: 55
ADLS_ACUITY_SCORE: 52
ADLS_ACUITY_SCORE: 51
ADLS_ACUITY_SCORE: 62
ADLS_ACUITY_SCORE: 41
ADLS_ACUITY_SCORE: 52
ADLS_ACUITY_SCORE: 59
ADLS_ACUITY_SCORE: 41
ADLS_ACUITY_SCORE: 58
ADLS_ACUITY_SCORE: 62
ADLS_ACUITY_SCORE: 52
ADLS_ACUITY_SCORE: 61
ADLS_ACUITY_SCORE: 49
ADLS_ACUITY_SCORE: 62
ADLS_ACUITY_SCORE: 48
ADLS_ACUITY_SCORE: 62
ADLS_ACUITY_SCORE: 52
ADLS_ACUITY_SCORE: 56
ADLS_ACUITY_SCORE: 58
ADLS_ACUITY_SCORE: 57
ADLS_ACUITY_SCORE: 57
ADLS_ACUITY_SCORE: 51
ADLS_ACUITY_SCORE: 51
ADLS_ACUITY_SCORE: 41
ADLS_ACUITY_SCORE: 53
ADLS_ACUITY_SCORE: 61
ADLS_ACUITY_SCORE: 52
ADLS_ACUITY_SCORE: 53
ADLS_ACUITY_SCORE: 58
ADLS_ACUITY_SCORE: 53
ADLS_ACUITY_SCORE: 58
ADLS_ACUITY_SCORE: 41
ADLS_ACUITY_SCORE: 56
ADLS_ACUITY_SCORE: 52
ADLS_ACUITY_SCORE: 57
ADLS_ACUITY_SCORE: 52
ADLS_ACUITY_SCORE: 58
ADLS_ACUITY_SCORE: 41
ADLS_ACUITY_SCORE: 62
ADLS_ACUITY_SCORE: 62
ADLS_ACUITY_SCORE: 53
ADLS_ACUITY_SCORE: 51
ADLS_ACUITY_SCORE: 58
ADLS_ACUITY_SCORE: 51
ADLS_ACUITY_SCORE: 62
ADLS_ACUITY_SCORE: 52
ADLS_ACUITY_SCORE: 52
ADLS_ACUITY_SCORE: 62
ADLS_ACUITY_SCORE: 61
ADLS_ACUITY_SCORE: 52
ADLS_ACUITY_SCORE: 62
ADLS_ACUITY_SCORE: 53
ADLS_ACUITY_SCORE: 58
ADLS_ACUITY_SCORE: 53
ADLS_ACUITY_SCORE: 52
ADLS_ACUITY_SCORE: 57
ADLS_ACUITY_SCORE: 62
ADLS_ACUITY_SCORE: 52
ADLS_ACUITY_SCORE: 63
ADLS_ACUITY_SCORE: 56
ADLS_ACUITY_SCORE: 63
ADLS_ACUITY_SCORE: 59
ADLS_ACUITY_SCORE: 64
ADLS_ACUITY_SCORE: 52
ADLS_ACUITY_SCORE: 59
ADLS_ACUITY_SCORE: 52
ADLS_ACUITY_SCORE: 52
ADLS_ACUITY_SCORE: 61
ADLS_ACUITY_SCORE: 48
ADLS_ACUITY_SCORE: 55
ADLS_ACUITY_SCORE: 52
ADLS_ACUITY_SCORE: 53
ADLS_ACUITY_SCORE: 62
ADLS_ACUITY_SCORE: 52
ADLS_ACUITY_SCORE: 52
ADLS_ACUITY_SCORE: 57
ADLS_ACUITY_SCORE: 51
ADLS_ACUITY_SCORE: 52
ADLS_ACUITY_SCORE: 52
ADLS_ACUITY_SCORE: 62
ADLS_ACUITY_SCORE: 53
ADLS_ACUITY_SCORE: 41
ADLS_ACUITY_SCORE: 53
ADLS_ACUITY_SCORE: 52
ADLS_ACUITY_SCORE: 53
ADLS_ACUITY_SCORE: 56
ADLS_ACUITY_SCORE: 52
ADLS_ACUITY_SCORE: 51
ADLS_ACUITY_SCORE: 52
ADLS_ACUITY_SCORE: 62
ADLS_ACUITY_SCORE: 62
ADLS_ACUITY_SCORE: 65
ADLS_ACUITY_SCORE: 61
ADLS_ACUITY_SCORE: 52
ADLS_ACUITY_SCORE: 53
ADLS_ACUITY_SCORE: 52
ADLS_ACUITY_SCORE: 63
ADLS_ACUITY_SCORE: 61
ADLS_ACUITY_SCORE: 46
ADLS_ACUITY_SCORE: 52
ADLS_ACUITY_SCORE: 41
ADLS_ACUITY_SCORE: 49
ADLS_ACUITY_SCORE: 52
ADLS_ACUITY_SCORE: 52
ADLS_ACUITY_SCORE: 40
ADLS_ACUITY_SCORE: 52
ADLS_ACUITY_SCORE: 53
ADLS_ACUITY_SCORE: 53
ADLS_ACUITY_SCORE: 41
ADLS_ACUITY_SCORE: 52
ADLS_ACUITY_SCORE: 52
ADLS_ACUITY_SCORE: 62
ADLS_ACUITY_SCORE: 52
ADLS_ACUITY_SCORE: 41
ADLS_ACUITY_SCORE: 52
ADLS_ACUITY_SCORE: 68
ADLS_ACUITY_SCORE: 52
ADLS_ACUITY_SCORE: 51
ADLS_ACUITY_SCORE: 52
ADLS_ACUITY_SCORE: 52
ADLS_ACUITY_SCORE: 53
ADLS_ACUITY_SCORE: 57
ADLS_ACUITY_SCORE: 52
ADLS_ACUITY_SCORE: 51
ADLS_ACUITY_SCORE: 53
ADLS_ACUITY_SCORE: 48
ADLS_ACUITY_SCORE: 52
ADLS_ACUITY_SCORE: 53
ADLS_ACUITY_SCORE: 62
ADLS_ACUITY_SCORE: 52
ADLS_ACUITY_SCORE: 51
ADLS_ACUITY_SCORE: 63
ADLS_ACUITY_SCORE: 43
ADLS_ACUITY_SCORE: 62
ADLS_ACUITY_SCORE: 51
ADLS_ACUITY_SCORE: 53
ADLS_ACUITY_SCORE: 51
ADLS_ACUITY_SCORE: 41
ADLS_ACUITY_SCORE: 46
ADLS_ACUITY_SCORE: 58
ADLS_ACUITY_SCORE: 52
ADLS_ACUITY_SCORE: 48
ADLS_ACUITY_SCORE: 65
ADLS_ACUITY_SCORE: 57
ADLS_ACUITY_SCORE: 62
ADLS_ACUITY_SCORE: 41
ADLS_ACUITY_SCORE: 52
ADLS_ACUITY_SCORE: 66
ADLS_ACUITY_SCORE: 56
ADLS_ACUITY_SCORE: 52
ADLS_ACUITY_SCORE: 52
ADLS_ACUITY_SCORE: 41
ADLS_ACUITY_SCORE: 53
ADLS_ACUITY_SCORE: 62
ADLS_ACUITY_SCORE: 53
ADLS_ACUITY_SCORE: 41
ADLS_ACUITY_SCORE: 52
ADLS_ACUITY_SCORE: 51
ADLS_ACUITY_SCORE: 68
ADLS_ACUITY_SCORE: 48
ADLS_ACUITY_SCORE: 58
ADLS_ACUITY_SCORE: 51
ADLS_ACUITY_SCORE: 56
ADLS_ACUITY_SCORE: 55
ADLS_ACUITY_SCORE: 54
ADLS_ACUITY_SCORE: 49
ADLS_ACUITY_SCORE: 58
ADLS_ACUITY_SCORE: 61
ADLS_ACUITY_SCORE: 51
ADLS_ACUITY_SCORE: 52
ADLS_ACUITY_SCORE: 57
ADLS_ACUITY_SCORE: 56
ADLS_ACUITY_SCORE: 52
ADLS_ACUITY_SCORE: 48
ADLS_ACUITY_SCORE: 41
ADLS_ACUITY_SCORE: 48
ADLS_ACUITY_SCORE: 61
ADLS_ACUITY_SCORE: 57
ADLS_ACUITY_SCORE: 51
ADLS_ACUITY_SCORE: 58
ADLS_ACUITY_SCORE: 52
ADLS_ACUITY_SCORE: 55
ADLS_ACUITY_SCORE: 68
ADLS_ACUITY_SCORE: 52
ADLS_ACUITY_SCORE: 52
ADLS_ACUITY_SCORE: 62
ADLS_ACUITY_SCORE: 61
ADLS_ACUITY_SCORE: 41
ADLS_ACUITY_SCORE: 58
ADLS_ACUITY_SCORE: 66
ADLS_ACUITY_SCORE: 55
ADLS_ACUITY_SCORE: 56
ADLS_ACUITY_SCORE: 52
ADLS_ACUITY_SCORE: 65
ADLS_ACUITY_SCORE: 52
ADLS_ACUITY_SCORE: 63
ADLS_ACUITY_SCORE: 51
ADLS_ACUITY_SCORE: 48
ADLS_ACUITY_SCORE: 65
ADLS_ACUITY_SCORE: 61
ADLS_ACUITY_SCORE: 48
ADLS_ACUITY_SCORE: 62
ADLS_ACUITY_SCORE: 62
ADLS_ACUITY_SCORE: 61
ADLS_ACUITY_SCORE: 58
ADLS_ACUITY_SCORE: 52
ADLS_ACUITY_SCORE: 61
ADLS_ACUITY_SCORE: 52
ADLS_ACUITY_SCORE: 41
ADLS_ACUITY_SCORE: 53
ADLS_ACUITY_SCORE: 53
ADLS_ACUITY_SCORE: 52
ADLS_ACUITY_SCORE: 58
ADLS_ACUITY_SCORE: 52
ADLS_ACUITY_SCORE: 51
ADLS_ACUITY_SCORE: 52
ADLS_ACUITY_SCORE: 48
ADLS_ACUITY_SCORE: 52
ADLS_ACUITY_SCORE: 58
ADLS_ACUITY_SCORE: 52
ADLS_ACUITY_SCORE: 51
ADLS_ACUITY_SCORE: 68
ADLS_ACUITY_SCORE: 61
ADLS_ACUITY_SCORE: 55
ADLS_ACUITY_SCORE: 62
ADLS_ACUITY_SCORE: 62
ADLS_ACUITY_SCORE: 53
ADLS_ACUITY_SCORE: 49
ADLS_ACUITY_SCORE: 53
ADLS_ACUITY_SCORE: 63
ADLS_ACUITY_SCORE: 57
ADLS_ACUITY_SCORE: 61
ADLS_ACUITY_SCORE: 41
ADLS_ACUITY_SCORE: 52
ADLS_ACUITY_SCORE: 52
ADLS_ACUITY_SCORE: 62
ADLS_ACUITY_SCORE: 41
ADLS_ACUITY_SCORE: 62
ADLS_ACUITY_SCORE: 63
ADLS_ACUITY_SCORE: 61
ADLS_ACUITY_SCORE: 53
ADLS_ACUITY_SCORE: 52
ADLS_ACUITY_SCORE: 48
ADLS_ACUITY_SCORE: 61
ADLS_ACUITY_SCORE: 55
ADLS_ACUITY_SCORE: 52
ADLS_ACUITY_SCORE: 66
ADLS_ACUITY_SCORE: 62
ADLS_ACUITY_SCORE: 56
ADLS_ACUITY_SCORE: 51
ADLS_ACUITY_SCORE: 52
ADLS_ACUITY_SCORE: 62
ADLS_ACUITY_SCORE: 52
ADLS_ACUITY_SCORE: 43
ADLS_ACUITY_SCORE: 63
ADLS_ACUITY_SCORE: 68
ADLS_ACUITY_SCORE: 52
ADLS_ACUITY_SCORE: 48
ADLS_ACUITY_SCORE: 52
ADLS_ACUITY_SCORE: 52
ADLS_ACUITY_SCORE: 68
ADLS_ACUITY_SCORE: 62
ADLS_ACUITY_SCORE: 61
ADLS_ACUITY_SCORE: 51
ADLS_ACUITY_SCORE: 53
ADLS_ACUITY_SCORE: 52
ADLS_ACUITY_SCORE: 53
ADLS_ACUITY_SCORE: 49
ADLS_ACUITY_SCORE: 52
ADLS_ACUITY_SCORE: 52
ADLS_ACUITY_SCORE: 41
ADLS_ACUITY_SCORE: 52
ADLS_ACUITY_SCORE: 52
ADLS_ACUITY_SCORE: 53
ADLS_ACUITY_SCORE: 54
ADLS_ACUITY_SCORE: 64
ADLS_ACUITY_SCORE: 52
ADLS_ACUITY_SCORE: 61
ADLS_ACUITY_SCORE: 61
ADLS_ACUITY_SCORE: 52
ADLS_ACUITY_SCORE: 57
ADLS_ACUITY_SCORE: 62
ADLS_ACUITY_SCORE: 41
ADLS_ACUITY_SCORE: 41
ADLS_ACUITY_SCORE: 53
ADLS_ACUITY_SCORE: 53
ADLS_ACUITY_SCORE: 68
ADLS_ACUITY_SCORE: 52
ADLS_ACUITY_SCORE: 61
ADLS_ACUITY_SCORE: 52
ADLS_ACUITY_SCORE: 41
ADLS_ACUITY_SCORE: 61
ADLS_ACUITY_SCORE: 62
ADLS_ACUITY_SCORE: 52
ADLS_ACUITY_SCORE: 61
ADLS_ACUITY_SCORE: 41
ADLS_ACUITY_SCORE: 48
ADLS_ACUITY_SCORE: 56
ADLS_ACUITY_SCORE: 56
ADLS_ACUITY_SCORE: 57
ADLS_ACUITY_SCORE: 65
ADLS_ACUITY_SCORE: 51
ADLS_ACUITY_SCORE: 41
ADLS_ACUITY_SCORE: 53
ADLS_ACUITY_SCORE: 51
ADLS_ACUITY_SCORE: 57
ADLS_ACUITY_SCORE: 56
ADLS_ACUITY_SCORE: 52
ADLS_ACUITY_SCORE: 53
ADLS_ACUITY_SCORE: 53
ADLS_ACUITY_SCORE: 56
ADLS_ACUITY_SCORE: 48
ADLS_ACUITY_SCORE: 63
ADLS_ACUITY_SCORE: 52
ADLS_ACUITY_SCORE: 62
ADLS_ACUITY_SCORE: 58
ADLS_ACUITY_SCORE: 52
ADLS_ACUITY_SCORE: 56
ADLS_ACUITY_SCORE: 55
ADLS_ACUITY_SCORE: 52
ADLS_ACUITY_SCORE: 53
ADLS_ACUITY_SCORE: 57
ADLS_ACUITY_SCORE: 51
ADLS_ACUITY_SCORE: 52
ADLS_ACUITY_SCORE: 46
ADLS_ACUITY_SCORE: 52
ADLS_ACUITY_SCORE: 61
ADLS_ACUITY_SCORE: 56
ADLS_ACUITY_SCORE: 52
ADLS_ACUITY_SCORE: 57
ADLS_ACUITY_SCORE: 51
ADLS_ACUITY_SCORE: 53
ADLS_ACUITY_SCORE: 48
ADLS_ACUITY_SCORE: 57
ADLS_ACUITY_SCORE: 55
ADLS_ACUITY_SCORE: 68
ADLS_ACUITY_SCORE: 51
ADLS_ACUITY_SCORE: 53
ADLS_ACUITY_SCORE: 52
ADLS_ACUITY_SCORE: 56
ADLS_ACUITY_SCORE: 57
ADLS_ACUITY_SCORE: 51
ADLS_ACUITY_SCORE: 47
ADLS_ACUITY_SCORE: 64
ADLS_ACUITY_SCORE: 52
ADLS_ACUITY_SCORE: 52
ADLS_ACUITY_SCORE: 64
ADLS_ACUITY_SCORE: 52
ADLS_ACUITY_SCORE: 61
ADLS_ACUITY_SCORE: 65
ADLS_ACUITY_SCORE: 53
ADLS_ACUITY_SCORE: 56
ADLS_ACUITY_SCORE: 48
ADLS_ACUITY_SCORE: 53
ADLS_ACUITY_SCORE: 52
ADLS_ACUITY_SCORE: 41
ADLS_ACUITY_SCORE: 41
ADLS_ACUITY_SCORE: 58
ADLS_ACUITY_SCORE: 52
ADLS_ACUITY_SCORE: 51
ADLS_ACUITY_SCORE: 49
ADLS_ACUITY_SCORE: 56
ADLS_ACUITY_SCORE: 63
ADLS_ACUITY_SCORE: 51
ADLS_ACUITY_SCORE: 58
ADLS_ACUITY_SCORE: 52
ADLS_ACUITY_SCORE: 53
ADLS_ACUITY_SCORE: 57
ADLS_ACUITY_SCORE: 58
ADLS_ACUITY_SCORE: 52
ADLS_ACUITY_SCORE: 52
ADLS_ACUITY_SCORE: 55
ADLS_ACUITY_SCORE: 52
ADLS_ACUITY_SCORE: 61
ADLS_ACUITY_SCORE: 51
ADLS_ACUITY_SCORE: 61
ADLS_ACUITY_SCORE: 53
ADLS_ACUITY_SCORE: 52
ADLS_ACUITY_SCORE: 56
ADLS_ACUITY_SCORE: 52
ADLS_ACUITY_SCORE: 61
ADLS_ACUITY_SCORE: 62
ADLS_ACUITY_SCORE: 39
ADLS_ACUITY_SCORE: 52
ADLS_ACUITY_SCORE: 48
ADLS_ACUITY_SCORE: 58
ADLS_ACUITY_SCORE: 41
ADLS_ACUITY_SCORE: 52
ADLS_ACUITY_SCORE: 63
ADLS_ACUITY_SCORE: 52
ADLS_ACUITY_SCORE: 52
ADLS_ACUITY_SCORE: 61
ADLS_ACUITY_SCORE: 53
ADLS_ACUITY_SCORE: 62
ADLS_ACUITY_SCORE: 52
ADLS_ACUITY_SCORE: 51
ADLS_ACUITY_SCORE: 48
ADLS_ACUITY_SCORE: 52
ADLS_ACUITY_SCORE: 53
ADLS_ACUITY_SCORE: 52
ADLS_ACUITY_SCORE: 41
ADLS_ACUITY_SCORE: 55
ADLS_ACUITY_SCORE: 52
ADLS_ACUITY_SCORE: 52
ADLS_ACUITY_SCORE: 65
ADLS_ACUITY_SCORE: 53
ADLS_ACUITY_SCORE: 51
ADLS_ACUITY_SCORE: 53
ADLS_ACUITY_SCORE: 62
ADLS_ACUITY_SCORE: 52
ADLS_ACUITY_SCORE: 56
ADLS_ACUITY_SCORE: 65
ADLS_ACUITY_SCORE: 56
ADLS_ACUITY_SCORE: 56
ADLS_ACUITY_SCORE: 53
ADLS_ACUITY_SCORE: 48
ADLS_ACUITY_SCORE: 68
ADLS_ACUITY_SCORE: 56
ADLS_ACUITY_SCORE: 51
ADLS_ACUITY_SCORE: 41
ADLS_ACUITY_SCORE: 48
ADLS_ACUITY_SCORE: 62
ADLS_ACUITY_SCORE: 52
ADLS_ACUITY_SCORE: 66
ADLS_ACUITY_SCORE: 49
ADLS_ACUITY_SCORE: 46
ADLS_ACUITY_SCORE: 61
ADLS_ACUITY_SCORE: 46
ADLS_ACUITY_SCORE: 64
ADLS_ACUITY_SCORE: 48
ADLS_ACUITY_SCORE: 55
ADLS_ACUITY_SCORE: 52
ADLS_ACUITY_SCORE: 59
ADLS_ACUITY_SCORE: 52
ADLS_ACUITY_SCORE: 53
ADLS_ACUITY_SCORE: 66
ADLS_ACUITY_SCORE: 51
ADLS_ACUITY_SCORE: 52
ADLS_ACUITY_SCORE: 53
ADLS_ACUITY_SCORE: 46
ADLS_ACUITY_SCORE: 52
ADLS_ACUITY_SCORE: 53
ADLS_ACUITY_SCORE: 53
ADLS_ACUITY_SCORE: 51
ADLS_ACUITY_SCORE: 57
ADLS_ACUITY_SCORE: 52
ADLS_ACUITY_SCORE: 62
ADLS_ACUITY_SCORE: 62
ADLS_ACUITY_SCORE: 52
ADLS_ACUITY_SCORE: 52
ADLS_ACUITY_SCORE: 58
ADLS_ACUITY_SCORE: 52
ADLS_ACUITY_SCORE: 43
ADLS_ACUITY_SCORE: 57
ADLS_ACUITY_SCORE: 52
ADLS_ACUITY_SCORE: 57
ADLS_ACUITY_SCORE: 51
ADLS_ACUITY_SCORE: 51
ADLS_ACUITY_SCORE: 52
ADLS_ACUITY_SCORE: 46
ADLS_ACUITY_SCORE: 51
ADLS_ACUITY_SCORE: 57
ADLS_ACUITY_SCORE: 53
ADLS_ACUITY_SCORE: 53
ADLS_ACUITY_SCORE: 61
ADLS_ACUITY_SCORE: 56
ADLS_ACUITY_SCORE: 52
ADLS_ACUITY_SCORE: 65
ADLS_ACUITY_SCORE: 52
ADLS_ACUITY_SCORE: 51
ADLS_ACUITY_SCORE: 41
ADLS_ACUITY_SCORE: 55
ADLS_ACUITY_SCORE: 52
ADLS_ACUITY_SCORE: 41
ADLS_ACUITY_SCORE: 58
ADLS_ACUITY_SCORE: 52
ADLS_ACUITY_SCORE: 57
ADLS_ACUITY_SCORE: 51
ADLS_ACUITY_SCORE: 52
ADLS_ACUITY_SCORE: 52
ADLS_ACUITY_SCORE: 56
ADLS_ACUITY_SCORE: 52
ADLS_ACUITY_SCORE: 61
ADLS_ACUITY_SCORE: 52
ADLS_ACUITY_SCORE: 68
ADLS_ACUITY_SCORE: 43
ADLS_ACUITY_SCORE: 53
ADLS_ACUITY_SCORE: 46
ADLS_ACUITY_SCORE: 52
ADLS_ACUITY_SCORE: 64
ADLS_ACUITY_SCORE: 52
ADLS_ACUITY_SCORE: 63
ADLS_ACUITY_SCORE: 64
ADLS_ACUITY_SCORE: 52
ADLS_ACUITY_SCORE: 52
ADLS_ACUITY_SCORE: 57
ADLS_ACUITY_SCORE: 51
ADLS_ACUITY_SCORE: 51
ADLS_ACUITY_SCORE: 52
ADLS_ACUITY_SCORE: 51
ADLS_ACUITY_SCORE: 51
ADLS_ACUITY_SCORE: 56
ADLS_ACUITY_SCORE: 53
ADLS_ACUITY_SCORE: 52
ADLS_ACUITY_SCORE: 52
ADLS_ACUITY_SCORE: 68
ADLS_ACUITY_SCORE: 58
ADLS_ACUITY_SCORE: 52
ADLS_ACUITY_SCORE: 65
ADLS_ACUITY_SCORE: 61
ADLS_ACUITY_SCORE: 52
ADLS_ACUITY_SCORE: 41
ADLS_ACUITY_SCORE: 41
ADLS_ACUITY_SCORE: 56
ADLS_ACUITY_SCORE: 63
ADLS_ACUITY_SCORE: 55
ADLS_ACUITY_SCORE: 57
ADLS_ACUITY_SCORE: 62
ADLS_ACUITY_SCORE: 56
ADLS_ACUITY_SCORE: 52
ADLS_ACUITY_SCORE: 50
ADLS_ACUITY_SCORE: 52
ADLS_ACUITY_SCORE: 41
ADLS_ACUITY_SCORE: 52
ADLS_ACUITY_SCORE: 61
ADLS_ACUITY_SCORE: 62
ADLS_ACUITY_SCORE: 56
ADLS_ACUITY_SCORE: 52
ADLS_ACUITY_SCORE: 41
ADLS_ACUITY_SCORE: 66
ADLS_ACUITY_SCORE: 62
ADLS_ACUITY_SCORE: 52
ADLS_ACUITY_SCORE: 62
ADLS_ACUITY_SCORE: 41
ADLS_ACUITY_SCORE: 53
ADLS_ACUITY_SCORE: 47
ADLS_ACUITY_SCORE: 64
ADLS_ACUITY_SCORE: 52
ADLS_ACUITY_SCORE: 62
ADLS_ACUITY_SCORE: 54
ADLS_ACUITY_SCORE: 52
ADLS_ACUITY_SCORE: 41
ADLS_ACUITY_SCORE: 62
ADLS_ACUITY_SCORE: 58
ADLS_ACUITY_SCORE: 68
ADLS_ACUITY_SCORE: 52
ADLS_ACUITY_SCORE: 52
ADLS_ACUITY_SCORE: 51
ADLS_ACUITY_SCORE: 61
ADLS_ACUITY_SCORE: 49
ADLS_ACUITY_SCORE: 53
ADLS_ACUITY_SCORE: 52
ADLS_ACUITY_SCORE: 41
ADLS_ACUITY_SCORE: 52
ADLS_ACUITY_SCORE: 51
ADLS_ACUITY_SCORE: 52
ADLS_ACUITY_SCORE: 62
ADLS_ACUITY_SCORE: 48
ADLS_ACUITY_SCORE: 53
ADLS_ACUITY_SCORE: 52
ADLS_ACUITY_SCORE: 66
ADLS_ACUITY_SCORE: 58
ADLS_ACUITY_SCORE: 56
ADLS_ACUITY_SCORE: 53
ADLS_ACUITY_SCORE: 51
ADLS_ACUITY_SCORE: 52
ADLS_ACUITY_SCORE: 52
ADLS_ACUITY_SCORE: 53
ADLS_ACUITY_SCORE: 49
ADLS_ACUITY_SCORE: 52
ADLS_ACUITY_SCORE: 61
ADLS_ACUITY_SCORE: 51
ADLS_ACUITY_SCORE: 62
ADLS_ACUITY_SCORE: 57
ADLS_ACUITY_SCORE: 41
ADLS_ACUITY_SCORE: 53
ADLS_ACUITY_SCORE: 64
ADLS_ACUITY_SCORE: 52
ADLS_ACUITY_SCORE: 52
ADLS_ACUITY_SCORE: 53
ADLS_ACUITY_SCORE: 61
ADLS_ACUITY_SCORE: 62
ADLS_ACUITY_SCORE: 52
ADLS_ACUITY_SCORE: 52
ADLS_ACUITY_SCORE: 62
ADLS_ACUITY_SCORE: 62
ADLS_ACUITY_SCORE: 52
ADLS_ACUITY_SCORE: 68
ADLS_ACUITY_SCORE: 41
ADLS_ACUITY_SCORE: 57
ADLS_ACUITY_SCORE: 55
ADLS_ACUITY_SCORE: 52
ADLS_ACUITY_SCORE: 56
ADLS_ACUITY_SCORE: 53
ADLS_ACUITY_SCORE: 52
ADLS_ACUITY_SCORE: 41
ADLS_ACUITY_SCORE: 61
ADLS_ACUITY_SCORE: 52
ADLS_ACUITY_SCORE: 68
ADLS_ACUITY_SCORE: 52
ADLS_ACUITY_SCORE: 52
ADLS_ACUITY_SCORE: 48
ADLS_ACUITY_SCORE: 56
ADLS_ACUITY_SCORE: 48
ADLS_ACUITY_SCORE: 55
ADLS_ACUITY_SCORE: 52
ADLS_ACUITY_SCORE: 52
ADLS_ACUITY_SCORE: 62
ADLS_ACUITY_SCORE: 52
ADLS_ACUITY_SCORE: 52
ADLS_ACUITY_SCORE: 62
ADLS_ACUITY_SCORE: 52
ADLS_ACUITY_SCORE: 48
ADLS_ACUITY_SCORE: 53
ADLS_ACUITY_SCORE: 58
ADLS_ACUITY_SCORE: 53
ADLS_ACUITY_SCORE: 48
ADLS_ACUITY_SCORE: 65
ADLS_ACUITY_SCORE: 62
ADLS_ACUITY_SCORE: 59
ADLS_ACUITY_SCORE: 41
ADLS_ACUITY_SCORE: 52
ADLS_ACUITY_SCORE: 53
ADLS_ACUITY_SCORE: 52
ADLS_ACUITY_SCORE: 57
ADLS_ACUITY_SCORE: 58
ADLS_ACUITY_SCORE: 52
ADLS_ACUITY_SCORE: 57
ADLS_ACUITY_SCORE: 61
ADLS_ACUITY_SCORE: 51
ADLS_ACUITY_SCORE: 53
ADLS_ACUITY_SCORE: 48
ADLS_ACUITY_SCORE: 53
ADLS_ACUITY_SCORE: 52
ADLS_ACUITY_SCORE: 53
ADLS_ACUITY_SCORE: 52
ADLS_ACUITY_SCORE: 52
ADLS_ACUITY_SCORE: 53
ADLS_ACUITY_SCORE: 53
ADLS_ACUITY_SCORE: 52
ADLS_ACUITY_SCORE: 64
DEPENDENT_IADLS:: INDEPENDENT
ADLS_ACUITY_SCORE: 62
ADLS_ACUITY_SCORE: 60
ADLS_ACUITY_SCORE: 52
ADLS_ACUITY_SCORE: 52
ADLS_ACUITY_SCORE: 41
ADLS_ACUITY_SCORE: 52
ADLS_ACUITY_SCORE: 68
ADLS_ACUITY_SCORE: 48
ADLS_ACUITY_SCORE: 53
ADLS_ACUITY_SCORE: 61
ADLS_ACUITY_SCORE: 46
ADLS_ACUITY_SCORE: 52
ADLS_ACUITY_SCORE: 52
ADLS_ACUITY_SCORE: 58
ADLS_ACUITY_SCORE: 62
ADLS_ACUITY_SCORE: 57
ADLS_ACUITY_SCORE: 55
ADLS_ACUITY_SCORE: 53
ADLS_ACUITY_SCORE: 58
ADLS_ACUITY_SCORE: 49
ADLS_ACUITY_SCORE: 58
ADLS_ACUITY_SCORE: 49
ADLS_ACUITY_SCORE: 62
ADLS_ACUITY_SCORE: 55
ADLS_ACUITY_SCORE: 52
ADLS_ACUITY_SCORE: 52
ADLS_ACUITY_SCORE: 66
ADLS_ACUITY_SCORE: 52
ADLS_ACUITY_SCORE: 58
ADLS_ACUITY_SCORE: 62
ADLS_ACUITY_SCORE: 56
ADLS_ACUITY_SCORE: 52
ADLS_ACUITY_SCORE: 53
ADLS_ACUITY_SCORE: 52
ADLS_ACUITY_SCORE: 52
ADLS_ACUITY_SCORE: 58
ADLS_ACUITY_SCORE: 56
ADLS_ACUITY_SCORE: 52
ADLS_ACUITY_SCORE: 52
ADLS_ACUITY_SCORE: 61
ADLS_ACUITY_SCORE: 62
ADLS_ACUITY_SCORE: 62
ADLS_ACUITY_SCORE: 52
ADLS_ACUITY_SCORE: 60
ADLS_ACUITY_SCORE: 62
ADLS_ACUITY_SCORE: 52
ADLS_ACUITY_SCORE: 62
ADLS_ACUITY_SCORE: 53
ADLS_ACUITY_SCORE: 52
ADLS_ACUITY_SCORE: 52
ADLS_ACUITY_SCORE: 60
ADLS_ACUITY_SCORE: 41
ADLS_ACUITY_SCORE: 52
ADLS_ACUITY_SCORE: 51
ADLS_ACUITY_SCORE: 55
ADLS_ACUITY_SCORE: 66
ADLS_ACUITY_SCORE: 53
ADLS_ACUITY_SCORE: 64
ADLS_ACUITY_SCORE: 52
ADLS_ACUITY_SCORE: 57
ADLS_ACUITY_SCORE: 52
ADLS_ACUITY_SCORE: 53
ADLS_ACUITY_SCORE: 48
ADLS_ACUITY_SCORE: 51
ADLS_ACUITY_SCORE: 55
ADLS_ACUITY_SCORE: 41
ADLS_ACUITY_SCORE: 52
ADLS_ACUITY_SCORE: 61
ADLS_ACUITY_SCORE: 52
ADLS_ACUITY_SCORE: 41
ADLS_ACUITY_SCORE: 56
ADLS_ACUITY_SCORE: 56
ADLS_ACUITY_SCORE: 41
ADLS_ACUITY_SCORE: 60
ADLS_ACUITY_SCORE: 68
ADLS_ACUITY_SCORE: 58
ADLS_ACUITY_SCORE: 61
ADLS_ACUITY_SCORE: 62
ADLS_ACUITY_SCORE: 65
ADLS_ACUITY_SCORE: 63
ADLS_ACUITY_SCORE: 56
ADLS_ACUITY_SCORE: 52
ADLS_ACUITY_SCORE: 52
ADLS_ACUITY_SCORE: 53
ADLS_ACUITY_SCORE: 53
ADLS_ACUITY_SCORE: 55
ADLS_ACUITY_SCORE: 57
ADLS_ACUITY_SCORE: 55
ADLS_ACUITY_SCORE: 64
ADLS_ACUITY_SCORE: 51
ADLS_ACUITY_SCORE: 52
ADLS_ACUITY_SCORE: 41
ADLS_ACUITY_SCORE: 52
ADLS_ACUITY_SCORE: 57
ADLS_ACUITY_SCORE: 52
ADLS_ACUITY_SCORE: 53
ADLS_ACUITY_SCORE: 52
ADLS_ACUITY_SCORE: 52
ADLS_ACUITY_SCORE: 41
ADLS_ACUITY_SCORE: 48
ADLS_ACUITY_SCORE: 52
ADLS_ACUITY_SCORE: 52
ADLS_ACUITY_SCORE: 62
ADLS_ACUITY_SCORE: 41
ADLS_ACUITY_SCORE: 61
ADLS_ACUITY_SCORE: 66
ADLS_ACUITY_SCORE: 57
ADLS_ACUITY_SCORE: 68
ADLS_ACUITY_SCORE: 62
ADLS_ACUITY_SCORE: 53
ADLS_ACUITY_SCORE: 52
ADLS_ACUITY_SCORE: 61
ADLS_ACUITY_SCORE: 62
ADLS_ACUITY_SCORE: 52
ADLS_ACUITY_SCORE: 61
ADLS_ACUITY_SCORE: 66
ADLS_ACUITY_SCORE: 53
ADLS_ACUITY_SCORE: 52
ADLS_ACUITY_SCORE: 62
ADLS_ACUITY_SCORE: 52
ADLS_ACUITY_SCORE: 58
ADLS_ACUITY_SCORE: 55
ADLS_ACUITY_SCORE: 52
ADLS_ACUITY_SCORE: 52
ADLS_ACUITY_SCORE: 51
ADLS_ACUITY_SCORE: 41
ADLS_ACUITY_SCORE: 52
ADLS_ACUITY_SCORE: 53
ADLS_ACUITY_SCORE: 56
ADLS_ACUITY_SCORE: 66
ADLS_ACUITY_SCORE: 58
ADLS_ACUITY_SCORE: 52
ADLS_ACUITY_SCORE: 52
ADLS_ACUITY_SCORE: 56
ADLS_ACUITY_SCORE: 52
ADLS_ACUITY_SCORE: 53
ADLS_ACUITY_SCORE: 51
ADLS_ACUITY_SCORE: 53
ADLS_ACUITY_SCORE: 57
ADLS_ACUITY_SCORE: 52
ADLS_ACUITY_SCORE: 52
ADLS_ACUITY_SCORE: 62
ADLS_ACUITY_SCORE: 46
ADLS_ACUITY_SCORE: 52
ADLS_ACUITY_SCORE: 68
ADLS_ACUITY_SCORE: 58
ADLS_ACUITY_SCORE: 55
ADLS_ACUITY_SCORE: 51
ADLS_ACUITY_SCORE: 62
ADLS_ACUITY_SCORE: 57
ADLS_ACUITY_SCORE: 53
ADLS_ACUITY_SCORE: 62
ADLS_ACUITY_SCORE: 56
ADLS_ACUITY_SCORE: 52
ADLS_ACUITY_SCORE: 57
ADLS_ACUITY_SCORE: 61
ADLS_ACUITY_SCORE: 53
ADLS_ACUITY_SCORE: 56
ADLS_ACUITY_SCORE: 52
ADLS_ACUITY_SCORE: 51
ADLS_ACUITY_SCORE: 62
ADLS_ACUITY_SCORE: 56
ADLS_ACUITY_SCORE: 51
ADLS_ACUITY_SCORE: 43
ADLS_ACUITY_SCORE: 65
ADLS_ACUITY_SCORE: 52
ADLS_ACUITY_SCORE: 52
ADLS_ACUITY_SCORE: 53
ADLS_ACUITY_SCORE: 62
ADLS_ACUITY_SCORE: 61
ADLS_ACUITY_SCORE: 58
ADLS_ACUITY_SCORE: 58
ADLS_ACUITY_SCORE: 62
ADLS_ACUITY_SCORE: 41
ADLS_ACUITY_SCORE: 52
ADLS_ACUITY_SCORE: 52
ADLS_ACUITY_SCORE: 55
ADLS_ACUITY_SCORE: 41
ADLS_ACUITY_SCORE: 52
ADLS_ACUITY_SCORE: 51
ADLS_ACUITY_SCORE: 53
ADLS_ACUITY_SCORE: 52
ADLS_ACUITY_SCORE: 52
ADLS_ACUITY_SCORE: 56
ADLS_ACUITY_SCORE: 53
ADLS_ACUITY_SCORE: 52
ADLS_ACUITY_SCORE: 48
ADLS_ACUITY_SCORE: 61
ADLS_ACUITY_SCORE: 61
ADLS_ACUITY_SCORE: 41
ADLS_ACUITY_SCORE: 52
ADLS_ACUITY_SCORE: 51
ADLS_ACUITY_SCORE: 62
ADLS_ACUITY_SCORE: 52
ADLS_ACUITY_SCORE: 56
ADLS_ACUITY_SCORE: 61
ADLS_ACUITY_SCORE: 45
ADLS_ACUITY_SCORE: 53
ADLS_ACUITY_SCORE: 57
ADLS_ACUITY_SCORE: 49
ADLS_ACUITY_SCORE: 53
ADLS_ACUITY_SCORE: 51
ADLS_ACUITY_SCORE: 49
ADLS_ACUITY_SCORE: 55
ADLS_ACUITY_SCORE: 55
ADLS_ACUITY_SCORE: 53
ADLS_ACUITY_SCORE: 68
ADLS_ACUITY_SCORE: 40
ADLS_ACUITY_SCORE: 52
ADLS_ACUITY_SCORE: 52
ADLS_ACUITY_SCORE: 58
ADLS_ACUITY_SCORE: 51
ADLS_ACUITY_SCORE: 52
ADLS_ACUITY_SCORE: 61
ADLS_ACUITY_SCORE: 63
ADLS_ACUITY_SCORE: 62
ADLS_ACUITY_SCORE: 48
ADLS_ACUITY_SCORE: 58
ADLS_ACUITY_SCORE: 58
ADLS_ACUITY_SCORE: 48
ADLS_ACUITY_SCORE: 51
ADLS_ACUITY_SCORE: 55
ADLS_ACUITY_SCORE: 61
ADLS_ACUITY_SCORE: 61
ADLS_ACUITY_SCORE: 58
ADLS_ACUITY_SCORE: 53
ADLS_ACUITY_SCORE: 53
ADLS_ACUITY_SCORE: 51
ADLS_ACUITY_SCORE: 52
ADLS_ACUITY_SCORE: 51
ADLS_ACUITY_SCORE: 48
ADLS_ACUITY_SCORE: 41
ADLS_ACUITY_SCORE: 53
ADLS_ACUITY_SCORE: 61
ADLS_ACUITY_SCORE: 64
ADLS_ACUITY_SCORE: 65
ADLS_ACUITY_SCORE: 52
ADLS_ACUITY_SCORE: 52
ADLS_ACUITY_SCORE: 68
ADLS_ACUITY_SCORE: 52
ADLS_ACUITY_SCORE: 61
ADLS_ACUITY_SCORE: 41
ADLS_ACUITY_SCORE: 62
ADLS_ACUITY_SCORE: 62
ADLS_ACUITY_SCORE: 49
ADLS_ACUITY_SCORE: 59
ADLS_ACUITY_SCORE: 63
ADLS_ACUITY_SCORE: 64

## 2024-01-01 ASSESSMENT — COLUMBIA-SUICIDE SEVERITY RATING SCALE - C-SSRS
2. HAVE YOU ACTUALLY HAD ANY THOUGHTS OF KILLING YOURSELF IN THE PAST MONTH?: NO
6. HAVE YOU EVER DONE ANYTHING, STARTED TO DO ANYTHING, OR PREPARED TO DO ANYTHING TO END YOUR LIFE?: NO
1. IN THE PAST MONTH, HAVE YOU WISHED YOU WERE DEAD OR WISHED YOU COULD GO TO SLEEP AND NOT WAKE UP?: NO

## 2024-01-01 ASSESSMENT — LIFESTYLE VARIABLES
TOBACCO_USE: 0
TOBACCO_USE: 0

## 2024-01-01 ASSESSMENT — COPD QUESTIONNAIRES
COPD: 0
COPD: 0

## 2024-01-01 ASSESSMENT — ENCOUNTER SYMPTOMS
DYSRHYTHMIAS: 1

## 2024-01-01 ASSESSMENT — PAIN SCALES - GENERAL
PAINLEVEL: NO PAIN (0)

## 2024-01-05 NOTE — RESULT ENCOUNTER NOTE
Armand Mcdonnell,   Your tacrolimus level was 8.3 at 12 hours on 1/3/24 which is within your goal range of 8-10. No dose change at this time. Please call the transplant office ([phone]) with any questions.   Thanks,   Mikayla

## 2024-01-09 NOTE — TELEPHONE ENCOUNTER
Needs new script for Posaconazole 100mg [id number] - [place], [place] - [address] [facility]   Phone: [phone]  Fax: [phone]    Stated she is almost out of.

## 2024-01-09 NOTE — TELEPHONE ENCOUNTER
Writer contacted patient to scheduled repeat bronch. Scheduled 02/15 with Dr. Magaña. Transplant team will assist with H&P. Packet information sent via Fluxome per patient request.    Patrick Mejia on 1/9/2024 at 1:47 PM

## 2024-01-10 NOTE — TELEPHONE ENCOUNTER
[facility] lab calling with a critical creatinine of 5.6. Patient is ESRD on HD M/W/Fri.

## 2024-01-17 NOTE — TELEPHONE ENCOUNTER
Alk Phos has been elevated. Will check weekly labs. Vori level is pending from today. Pt agreeable to the plan.   
DATE:  7/28/2020   TIME OF RECEIPT FROM LAB:  1035  LAB TEST:  Alk Phos  LAB VALUE:  600  RESULTS GIVEN WITH READ-BACK TO (PROVIDER): Nasreen Francois  TIME LAB VALUE REPORTED TO PROVIDER:   1038  
Never smoker

## 2024-01-23 NOTE — TELEPHONE ENCOUNTER
Resent suspension tacrolimus to specialty [facility] pharmacy in [place] and will see if they are able to fill this for the patient.

## 2024-01-23 NOTE — TELEPHONE ENCOUNTER
General  Route to LPN    Reason for call: [facility] called due to the fact the don't do hazardous compounding, she says there is a [facility] in [place] that does. She said there is new laws in place for compounding tacrolimus     Call back needed? Yes    Return Call Needed  Same as documented in contacts section  When to return call?: Same day: Route High Priority

## 2024-01-23 NOTE — TELEPHONE ENCOUNTER
Pt reports needing a Fistulogram and wanting it to be done here. Per our IR, depending on urgency, may be quicker to get locally, but if patient is wanting to transfer here, info given on how to initiate that. Message sent to patient with this info and she will let her nephrologist at dialysis know.     Tacrolimus level 5.3 at 12 hours, on 1/19/24.  Goal 8-10.   Current dose 0.5 mg in AM, 1 mg in PM    Due to level being either too high or too low mostly with only 0.5 mg adjustment, will trial liquid tacrolimus at 0.6 mg bid. In the meantime, pt will increase her dose as follows:     Dose changed to 1 mg in AM, 1 mg in PM   Recheck level in 7 days.     Discussed with patient.      Repeat CMV this week.

## 2024-01-25 NOTE — TELEPHONE ENCOUNTER
A prior authorization is needed for the following compounded medications prescribed.  Please complete a prior authorization with the information included below.    Medication: Tacrolimus 1mg/ml susp (COMPOUNDED)    Ingredients                                                                  NDCs                                                Quantities                         Tacrolimus 5mg caps     [id number]   7.2     Sterile water for irrigation soln   [id number]   6   Ora-sweet syrp     [id number]   15   Ora-plus liqd      [id number]   15      RX #: [id number]  Reason for Rejection:prod/service not covered- plan/benefit exclusion. Not covered under part d law. This medication may be covered under part b    Pharmacy Insurance plan:medicare part d  BIN #: [id number]  ID #:[id number]  PCN #:[id number]  Phone #:[phone]      Pharmacy NPI:[id number]      Please advise the Compounding Pharmacy @ [phone] when the prior authorization is approved or denied.     Thank you for your time.    Brianna Fitzpatrick CPhT, JULIA    [facility]  [address] [place] [id number]   [email or web address]  [email or web address]   Phone: [phone]  Fax: [phone]

## 2024-01-25 NOTE — TELEPHONE ENCOUNTER
PA Initiation    Medication: TACROLIMUS (GENERIC) 1 MG/ML PO SUSP  Insurance Company: [employer] - Phone [phone] Fax [phone]  Pharmacy Filling the Rx: [facility] - [place], [place] - [address]  Filling Pharmacy Phone:    Filling Pharmacy Fax:    Start Date: 1/25/2024

## 2024-01-26 NOTE — CONSULTS
Outpatient Fistulagram IR Referral  01/26/24    Referring Provider: Dr. Howard Glasgow   IR Referral Request: Fistulagram of a LEFT upper arm loop Graft for prolonged bleeding post procedure, able to dialyze, no difficulty with access.  Patient wanting to have care at [facility].  Procedure Approved for: IR recommends updated US for procedure planning. Approved fistulagram for concerns of central recoil of previous IR intervention. Case discussed with Dr. Urbano  from IR.     Brief History:    Kecia Blue is a 63 year old female with history of GERD, ESRD, hemodialysis patient, anemia in ESRD, CMV viremia, seronegative rheumatoid arthritis, dermatomyositis, antisynthetase syndrome, lung interstitial disease, pulmonary hypertension due to interstitial lung disease, S/P lung transplant, and empyema of lung P AFIB, PONV, anisocoria,      Date created-9/30/2020 Left brachiocephalic fistula Kinder DR. BUTLER    Where and by whom- [facility]     Revised and when-11/13/2020 Dr. Butler [facility] LIGATION OF TRIBUTARIES; REVISION OF AV FISTULA; LIGATION OR BANDING OF ANGIOACCESS ARTERIOVENOUS FISTULA     Procedure(s): LIGATION OF TRIBUTARIES; REVISION OF AV FISTULA; LIGATION OR BANDING OF ANGIOACCESS ARTERIOVENOUS FISTULA     A tapered 4-7mm propatent graft was then attached to the distal end of the Nancy-Wick tunneler and delivered from the distal incision into the proximal axillary incision so that the 4 mm portion of the graft was in close proximity to the axillary artery.     Pre-Procedure Diagnose(s): Steal syndrome as complication of dialysis access    Date of last US-2/6/24 IMPRESSION:   1. Widely patent brachiocephalic graft.  No evidence for focal stenosis.   2. Flow volumes within the graft between 1 and 2.4 liters/minute.       11/1/23 IMPRESSION: Left axillary to cephalic graft.  1. No arterial inflow stenosis demonstrated.     2. Arterial anastomosis patent. 3.6 mm, 420 cm/s, 2.32 velocity  ratio.     3. Graft patent. 15.6 mm pseudoaneurysm in the upper arm with mural  thrombus causing minimal narrowing. 12.2 mm pseudoaneurysm in the mid  arm. Flow volumes in the graft well exceed 600 cc/min (1207 cc/min in  the upper arm and 1085 cc/min above the antecubital fossa.     4. Venous anastomosis patent. 5.1 mm, 153 cm/s, 194 cm/s     5. Outflow cephalic vein patent. Pseudoaneurysmal or aneurysmal  dilation in the antecubital fossa to 13.3 mm diameter. Flow volumes  exceed 600 cm/s.     6. Fistulogram demonstrated two cephalic subclavian confluences. Only  1 demonstrated in this study. [place] not well visualized.     7. Central venous stenosis or occlusion suggested. Fistulogram could  be performed for further evaluation and possible treatment. Innominate  vein not visualized. Bidirectional flow in the subclavian vein medial  to the cephalic subclavian confluence. Retrograde flow in subclavian  vein lateral to the confluence and in the axillary vein.    Date of last IR procedure- [facility] 11/2/23  IMPRESSION:  Fistulagram demonstrates significant recurrent narrowing of the left  innominate vein/medial subclavian vein with significant chest  collaterals. Segment was venoplastied to 10 mm followed by application  of 12 mm drug coated balloon with good angiographic results.     Problem with dialysis- prolonged bleeding     If on blood thinner and recent INR  NA    Pertinent Imaging Reviewed:  11/2/23, 2/6/24     Procedure order placed pending updated imaging. Referring team to order and update IR once completed.  IF emergent or urgent requests IR recommends coordination with facility who created and has been managing fistula.     Requesting team, Dr. Glasgow Nephrology, made aware of IR recommendations via Epic messaging.      ADRIÁN Santana CNP  Interventional Radiology   IR on-call pager: [phone]

## 2024-01-29 NOTE — TELEPHONE ENCOUNTER
Faxed additional information to insurance for review via [facility]

## 2024-01-30 NOTE — TELEPHONE ENCOUNTER
Prior Authorization Approval    Medication: TACROLIMUS (GENERIC) 1 MG/ML PO SUSP  Authorization Effective Date:    Authorization Expiration Date: 1/29/2025  Approved Dose/Quantity: ud  Reference #:     Insurance Company: [employer] - Phone [phone] Fax [phone]  Expected CoPay: $  10.78  CoPay Card Available: No    Financial Assistance Needed: n/a  Which Pharmacy is filling the prescription: [facility] - [place], [place] - [address]  Pharmacy Notified: yes  Patient Notified: no

## 2024-02-01 NOTE — TELEPHONE ENCOUNTER
Patient Call: General  Route to LPN    Reason for call: Luz from [facility] calling and needs Tacrolimus order put in for patient.    Call back needed? Yes    Return Call Needed  Same as documented in contacts section  When to return call?: Same day: Route High Priority

## 2024-02-02 NOTE — TELEPHONE ENCOUNTER
Do ultrasound sooner or set up for 2/14 when she's here to see us in clinic? Pt getting locally instead.      on 1/31/24, up from 274 the week prior. Recheck CMV early next week. Also contacting [facility] for other recommendations.     Pt started liquid tacrolimus on Tuesday, so will see how the level is next week.

## 2024-02-02 NOTE — TELEPHONE ENCOUNTER
PA Initiation    Medication: POSACONAZOLE 100 MG PO [facility]  Insurance Company: [employer] - Phone [phone] Fax [phone]  Pharmacy [facility]illing the Rx[id number] - [place]  Filling Pharmacy Phone:    Filling Pharmacy Fax:    Start Date: 2/2/2024

## 2024-02-02 NOTE — TELEPHONE ENCOUNTER
Prior Authorization Approval    Medication: POSACONAZOLE 100 MG PO [facility]  Authorization Effective Date: 1/1/2024  Authorization Expiration Date: 8/2/2024  Approved Dose/Quantity: ud  Reference #:     Insurance Company: [employer] - Phone [phone] Fax [phone]  Expected CoPay: $    CoPay Card Available: No    Financial Assistance Needed: n/a  Which Pharmacy is filling the prescription: [facility] #[id number] - [address]  Pharmacy Notified: no- proactive pa  Patient Notified: no- proactive pa

## 2024-02-07 NOTE — TELEPHONE ENCOUNTER
DATE: 2/7/24    TIME OF RECEIPT FROM LAB:  0958    LAB TEST:  Creatinine     LAB VALUE:  5.1    Patient on dialysis, creatinine level noted.

## 2024-02-12 NOTE — PROGRESS NOTES
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 This is a pre op clearance for OR bronch scheduled for tomorrow.     1. Recurrent right mainstem bronchial stenosis s/p balloon dilation: still with some intermittent wheezing, but denies other complaints. On 2 L O2 continuous.   - Bronch scheduled for tomorrow    2. 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 Is very fatigued during the day, denies headache or worsening confusion. Has started pulmonary rehab. Sating 93% on 2L. DSA 10/27/23 negative. CT chest today reviewed with essentially unchanged opacities and GGOs. PFTs today are stable.   - Recheck ImmuKnow and cf DNA today  - VBG today; will refer to Sleep in [place]  - Continue albuterol nebs PRN and Mucinex BID  - Continue IS including tacrolimus and prednisone; AZA on hold for EBV, also with Immunknow per above  - Bactrim for PJP ppx  CLAD: continue azithromycin, singulair, advair  - Following with Palliative care for GOC     3. EBV viremia: level peaked at 1 million on 11/21, underwent PET scan and saw Heme/Onc and is s/p rituxan last week. Levels now negative, last on 2/7.   - EBV pending for today    4. 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 Was started on VGCV three times weekly after HD approx 10/6/23. VGCV dosing increased to daily beginning 10/27 given persisting low-level viremia on repeat CMV levels. CMV was  negative for about two months, levels because to rise on 1/19.   - Back on valcyte on HD days  - CMV weekend  - Follow up with ID re: recurrence  - ImmuKnow pending per above    5. Left-sided aspergillus empyema:   A. Ochraceus: noted in 2019 s/p needle aspiration with Aspergillus fumigatus on cultures s/p intrapleural amphotericin bead placement. Now with A. Ochraceus on BAL from 11/1. Posaconazole indefinitely per ID. Last level of 1.4 on 11/3.   - Continue posaconazole  - Levels every 6 months     6. ESRD on iHD (since 10/2019):  HTN: continues on iHD M/W/F. BP elevated at 158/75 today in clinic, got restarted on metoprolol.   - Back on metoprolol    7. Leukopenia: WBC of 3.0 today. Likely related viremia and medications.   - AZA on hol  - CMV monitoring per above, will recheck next week     Chronic issues:  1. Paroxysmal AFib  2. Hypomagnesemia  3. Congestive hepatopathy with fibrosis  4. Osteoporosis    RTC: TBD labs and bronch  Vaccinations: got flu, covid and RSV vaccine  Preventative: colonoscopy due 2033; mammogram last Feb negative, follow up?; DEXA > May 2024; following with Derm, needs follow up    Ame Chow PA-C  Pulmonary, Allergy, Critical Care and Sleep Medicine        Interval History:     Since last visit nothing has changed. Started pulmonary rehab last week, Using 2 L all the time, occasionally 3L with walking. No new shortness of breath, cough is stable, does have a wheeze that comes and goes. Patient is quite sleepy during the day, no headaches, minimal confusion per . No chest pain or palpitations, no bloating or gas, stools are normal.           Review of Systems:   Please see HPI, otherwise the complete 10 point ROS is negative.           Past Medical and Surgical History:     Past Medical History:   Diagnosis Date    Acute on chronic respiratory failure with hypoxia (H) 02/21/2018    Anisocoria     Antisynthetase syndrome (H24) 2014    Anxiety     Aspergillus (H)     Aspergillus  pneumonia (H) 11/20/2020    Benign essential hypertension     C. difficile colitis     Cytomegalovirus (CMV) viremia (H)     Dermatomyositis (H)     Dysplasia of cervix, low grade (ESTRADA 1)     EBV (Franklin-Barr virus) viremia     ESRD (end stage renal disease) on dialysis (H)     ILD (interstitial lung disease) (H)     Lung replaced by transplant (H)     Osteopenia     Paroxysmal atrial fibrillation (H)     Pneumocystis jiroveci pneumonia (H)     PONV (postoperative nausea and vomiting)     Post-menopause     Pulmonary hypertension (H)     Raynaud's disease     Seronegative rheumatoid arthritis (H)      Past Surgical History:   Procedure Laterality Date    BRONCHOSCOPY (RIGID OR FLEXIBLE), DIAGNOSTIC N/A 4/10/2018    Procedure: COMBINED BRONCHOSCOPY (RIGID OR FLEXIBLE), LAVAGE;;  Surgeon: Mariposa Donohue MD;  Location: [facility]    BRONCHOSCOPY (RIGID OR FLEXIBLE), DIAGNOSTIC N/A 12/23/2020    Procedure: BRONCHOSCOPY, WITH BRONCHOALVEOLAR LAVAGE;  Surgeon: Uri Bass MD;  [facility]: [facility]    BRONCHOSCOPY (RIGID OR FLEXIBLE), DIAGNOSTIC N/A 5/26/2022    Procedure: BRONCHOSCOPY, WITH BRONCHOALVEOLAR LAVAGE;  Surgeon: Uri Bass MD;  Location: [facility]    BRONCHOSCOPY (RIGID OR FLEXIBLE), DIAGNOSTIC N/A 8/17/2023    Procedure: BRONCHOSCOPY, WITH BRONCHOALVEOLAR LAVAGE;  Surgeon: Esau Bruno MD;  Location: [facility]    BRONCHOSCOPY (RIGID OR FLEXIBLE), DIAGNOSTIC N/A 11/1/2023    Procedure: BRONCHOSCOPY, WITH BRONCHOALVEOLAR LAVAGE;  Surgeon: Beto Magaña DO;  Location: [facility]    BRONCHOSCOPY (RIGID OR FLEXIBLE), DILATE BRONCHUS / TRACHEA N/A 10/11/2018    Procedure: BRONCHOSCOPY (RIGID OR FLEXIBLE), DILATE BRONCHUS / TRACHEA;  Flexible And Rigid Bronchoscopy and Dilation;  Surgeon: Wilber Lin MD;  [facility]    BRONCHOSCOPY (RIGID OR FLEXIBLE), DILATE BRONCHUS / TRACHEA N/A 11/1/2023    Procedure: Bronchoscopy (Rigid Or Flexible), Dilate Bronchus / Trachea;  Surgeon: Archana  DO Beto;  Location: [facility]    BRONCHOSCOPY FLEXIBLE N/A 3/13/2018    Procedure: BRONCHOSCOPY FLEXIBLE;  Flexible Bronchoscopy ;  Surgeon: Gissell Sanchez MD;  [facility]: [facility]    BRONCHOSCOPY FLEXIBLE N/A 5/9/2018    Procedure: BRONCHOSCOPY FLEXIBLE;;  Surgeon: Wilber Lin MD;  [facility]: [facility]    BRONCHOSCOPY FLEXIBLE AND RIGID N/A 9/10/2018    Procedure: BRONCHOSCOPY FLEXIBLE AND RIGID;  Flexible and Rigid Bronchoscopy with Balloon Dilation, tissue debulking with cryo, and Right mainstem bronchus stent placement;  Surgeon: Wilber Lin MD;  [facility]    BRONCHOSCOPY RIGID N/A 6/6/2018    Procedure: BRONCHOSCOPY RIGID;;  Surgeon: Lopez Macias MD;  [facility]: [facility]    BRONCHOSCOPY, DILATE BRONCHUS, STENT BRONCHUS, COMBINED N/A 6/11/2018    Procedure: COMBINED BRONCHOSCOPY, DILATE BRONCHUS, STENT BRONCHUS;  Flexible Bronchoscopy, Balloon Dilation, Bronchial Washings;  Surgeon: Wilber Lin MD;  [facility]    BRONCHOSCOPY, DILATE BRONCHUS, STENT BRONCHUS, COMBINED Right 7/10/2018    Procedure: COMBINED BRONCHOSCOPY, DILATE BRONCHUS, STENT BRONCHUS;  Flexible Bronchoscopy, Balloon Dilation, Bronchial Washings  ;  Surgeon: Wilber Lin MD;  [facility]    BRONCHOSCOPY, DILATE BRONCHUS, STENT BRONCHUS, COMBINED N/A 8/2/2018    Procedure: COMBINED BRONCHOSCOPY, DILATE BRONCHUS, STENT BRONCHUS;  Flexible Bronchoscopy, Bronchial Washings, Balloon Dilation;  Surgeon: Wilber Lin MD;  [facility]    BRONCHOSCOPY, DILATE BRONCHUS, STENT BRONCHUS, COMBINED N/A 8/20/2018    Procedure: COMBINED BRONCHOSCOPY, DILATE BRONCHUS, STENT BRONCHUS;  Flexible Bronchoscopy, Balloon Dilation;  Surgeon: Wilber Lin MD;  [facility]    BRONCHOSCOPY, DILATE BRONCHUS, STENT BRONCHUS, COMBINED N/A 10/29/2018    Procedure: Flexible Bronchoscopy, Balloon Dilation, Stent Revision, Airway Examination And Therapeutic Suctioning, Cyro Tumor Debulking;   Surgeon: Wilber Lin MD;  [facility]    BRONCHOSCOPY, DILATE BRONCHUS, STENT BRONCHUS, COMBINED N/A 11/20/2018    Procedure: Rigid Bronchoscopy, Stent Removal and dilitation;  Surgeon: Wilber Lin MD;  [facility]    BRONCHOSCOPY, DILATE BRONCHUS, STENT BRONCHUS, COMBINED N/A 12/14/2018    Procedure: Flexible And Rigid Bronchoscopy, Balloon Dilation, Bronchial Washing;  Surgeon: Wilber Lin MD;  [facility]    BRONCHOSCOPY, DILATE BRONCHUS, STENT BRONCHUS, COMBINED N/A 1/17/2019    Procedure: Flexible And Rigid Bronchoscopy, Balloon Dilation.;  Surgeon: Wilber Lin MD;  [facility]    BRONCHOSCOPY, DILATE BRONCHUS, STENT BRONCHUS, COMBINED N/A 3/7/2019    Procedure: Flexible and Rigid Bronchoscopy, Bronchial Washing, Balloon Dilation;  Surgeon: Wilber Lin MD;  [facility]    BRONCHOSCOPY, DILATE BRONCHUS, STENT BRONCHUS, COMBINED N/A 6/6/2019    Procedure: Rigid and Flexible Bronchoscopy, Balloon Dilation;  Surgeon: Wilber Lin MD;  [facility]    BRONCHOSCOPY, DILATE BRONCHUS, STENT BRONCHUS, COMBINED N/A 10/11/2019    Procedure: Flexible and Rigid Bronchoscopy, Balloon Dilation, Bronchoalveolar Lagave;  Surgeon: Wilber Lin MD;  [facility]    BRONCHOSCOPY, DILATE BRONCHUS, STENT BRONCHUS, COMBINED N/A 2/19/2021    Procedure: BRONCHOSCOPY, flexible, airway dilation, and bronchial wash;  Surgeon: Wilber Lin MD;  [facility]    BRONCHOSCOPY, DILATE BRONCHUS, STENT BRONCHUS, COMBINED N/A 4/9/2021    Procedure: BRONCHOSCOPY, flexible and rigid, Airway suctioning;  Surgeon: Mati Norris MD;  [facility]    BRONCHOSCOPY, DILATE BRONCHUS, STENT BRONCHUS, COMBINED N/A 10/17/2023    Procedure: RIGID, flexible bronchoscopy with airway dilation;  Surgeon: Preet Patel MD;  [facility]    CV RIGHT HEART CATH MEASUREMENTS RECORDED N/A 3/10/2020    Procedure: CV RIGHT HEART CATH;  Surgeon:  Wai Garcia MD;  Location:  [facility]ATH [facility]    ENT SURGERY      tonsillectomy as a child    ESOPHAGOSCOPY, GASTROSCOPY, DUODENOSCOPY (EGD), COMBINED N/A 10/29/2018    Procedure: COMBINED ESOPHAGOSCOPY, GASTROSCOPY, DUODENOSCOPY (EGD) with biopsies and polypectomy;  Surgeon: Chente Bloom MD;  [facility]    INSERT EXTRACORPORAL MEMBRANE OXYGENATOR ADULT (OUTSIDE OR) N/A 2/27/2018    Procedure: INSERT EXTRACORPORAL MEMBRANE OXYGENATOR ADULT (OUTSIDE OR);  INSERT EXTRACORPORAL MEMBRANE OXYGENATOR ADULT (OUTSIDE OR) ;  Surgeon: Toby Hernandez MD;  [facility]    IR CVC TUNNEL PLACEMENT > 5 YRS OF AGE  10/25/2019    IR DIALYSIS FISTULOGRAM LEFT  3/2/2021    IR DIALYSIS FISTULOGRAM LEFT  11/2/2023    IR DIALYSIS MECH THROMB, PTA  3/2/2021    IR DIALYSIS PTA  11/2/2023    IR FLUORO 0-1 HOUR  5/7/2021    IR GASTRO JEJUNOSTOMY TUBE PLACEMENT  2/16/2021    IR PARACENTESIS  1/8/2020    IR THORACENTESIS  9/13/2019    IR TRANSCATHETER BIOPSY  1/8/2020    LASER CO2 BRONCHOSCOPY N/A 4/30/2021    Procedure: Flexible and Rigid Bronchoscopy and Tissue Debulking with CO2 Laser Assistance;  Surgeon: Mati Norris MD;  [facility]    LASER CO2 BRONCHOSCOPY N/A 6/11/2021    Procedure: BRONCHOSCOPY, Flexible and Rigid Bronchoscopy, Tissue Debulking with cryo Assistance, airway dilation,;  Surgeon: Mati Norris MD;  [facility]    LASER CO2 BRONCHOSCOPY N/A 9/16/2021    Procedure: BRONCHOSCOPY, flexible and rigid, CO2 Laser Debulking;  Surgeon: Mati Norris MD;  [facility]    LASER CO2 BRONCHOSCOPY N/A 2/11/2022    Procedure: flexible, rigid bronchoscopy, tissue debulking, airway dilation, co2 laser, bronchoalveolar lavage;  Surgeon: Juana Baugh MD;  [facility]    LASER CO2 BRONCHOSCOPY Right 11/17/2023    Procedure: flexible bronchosocopy, tissue/tumor debulking, using CO2 laser, mitomycin application and argon plasma coagulation;  Surgeon: Mati Norris  MD;  [facility]    no prior surgery      REMOVE EXTRACORPORAL MEMBRANE OXYGENATOR ADULT N/A 3/3/2018    Procedure: REMOVE EXTRACORPORAL MEMBRANE OXYGENATOR ADULT;  Removal of Right Femoral Venous and Right Axillary Arterial Extracorporeal Membrane Oxygenator;  Surgeon: Toby Hernandez MD;  [facility]    TRANSPLANT LUNG RECIPIENT SINGLE X2 Bilateral 3/1/2018    Procedure: TRANSPLANT LUNG RECIPIENT SINGLE X2;  Median Sternotomy, Extracorporeal Membrane Oxygenator, bilateral sequential lung transplant;  Surgeon: Toby Hernandez MD;  Location:  OR           Family History:     Family History   Problem Relation Age of Onset    Hypertension Mother     Arthritis Mother     Pancreatic Cancer Father     Diabetes Father             Social History:     Social History     Socioeconomic History    Marital status:      Spouse name: Not on file    Number of children: Not on file    Years of education: Not on file    Highest education level: Not on file   Occupational History    Not on file   Tobacco Use    Smoking status: Never    Smokeless tobacco: Never   Substance and Sexual Activity    Alcohol use: No     Alcohol/week: 1.0 standard drink of alcohol     Types: 1 Glasses of wine per week    Drug use: No    Sexual activity: Not on file   Other Topics Concern    Parent/sibling w/ CABG, MI or angioplasty before 65F 55M? No   Social History Narrative    3/6/2019 - Lives with . Has three daughters. Four grandchildren (two ). No pets. Travelled previously to [facility]. Has visited [place] several times.      Social Determinants of Health     Financial Resource Strain: Not on file   Food Insecurity: Not on file   Transportation Needs: Not on file   Physical Activity: Not on file   Stress: Not on file   Social Connections: Not on file   Interpersonal Safety: Not on file   Housing Stability: Not on file            Medications:     Current Outpatient Medications   Medication     metoprolol tartrate (LOPRESSOR) 25 MG tablet    acetaminophen (TYLENOL) 325 MG tablet    acetylcysteine (MUCOMYST) 10 % nebulizer solution    albuterol (PROVENTIL) (2.5 MG/3ML) 0.083% neb solution    azithromycin (ZITHROMAX) 250 MG tablet    benzonatate (TESSALON) 100 MG capsule    cholecalciferol 50 MCG (2000 UT) CAPS    fluticasone-salmeterol (ADVAIR) 250-50 MCG/ACT inhaler    guaiFENesin (MUCINEX) 600 MG 12 hr tablet    Magnesium Cl-Calcium Carbonate (SLOW-MAG) 71.5-119 MG TBEC    montelukast (SINGULAIR) 10 MG tablet    pantoprazole (PROTONIX) 40 MG EC tablet    posaconazole (NOXAFIL) 100 MG EC tablet    predniSONE (DELTASONE) 5 MG tablet    sulfamethoxazole-trimethoprim (BACTRIM) 400-80 MG tablet    tacrolimus (GENERIC) 1 mg/mL suspension    valGANciclovir (VALCYTE) 450 MG tablet     No current facility-administered medications for this visit.            Physical Exam:   BP (!) 158/75 (BP Location: Right arm, Patient Position: Sitting)   Pulse 77   LMP 06/07/2014 (Exact Date)   SpO2 93%     GENERAL: alert, NAD  HEENT: NCAT, EOMI, no scleral icterus, oral mucosa moist and without lesions  Neck: no cervical or supraclavicular adenopathy  Lungs: moderate airflow, scattered crackles and rhonchi  CV: irregular rhythm, S1S2, no murmurs noted  Abdomen: normoactive BS, soft  Lymph: no edema  Neuro: AAO X 3, CN 2-12 grossly intact  Psychiatric: normal affect, good eye contact  Skin: no rash, jaundice or lesions on limited exam         Data:   All laboratory and imaging data reviewed.      Recent Results (from the past 168 hour(s))   Magnesium    Collection Time: 02/14/24 10:50 AM   Result Value Ref Range    Magnesium 2.0 1.7 - 2.3 mg/dL   CBC with platelets    Collection Time: 02/14/24 10:50 AM   Result Value Ref Range    WBC Count 3.0 (L) 4.0 - 11.0 10e3/uL    RBC Count 3.59 (L) 3.80 - 5.20 10e6/uL    Hemoglobin 11.2 (L) 11.7 - 15.7 g/dL    Hematocrit 35.8 35.0 - 47.0 %     78 - 100 fL    MCH 31.2 26.5 - 33.0  pg    MCHC 31.3 (L) 31.5 - 36.5 g/dL    RDW 16.8 (H) 10.0 - 15.0 %    Platelet Count 142 (L) 150 - 450 10e3/uL   INR    Collection Time: 02/14/24 10:50 AM   Result Value Ref Range    INR 0.96 0.85 - 1.15   General PFT Lab (Please always keep checked)    Collection Time: 02/14/24 10:56 AM   Result Value Ref Range    FVC-Pred 2.88 L    FVC-Pre 0.85 L    FVC-%Pred-Pre 29 %    FEV1-Pre 0.80 L    FEV1-%Pred-Pre 34 %    FEV1FVC-Pred 80 %    FEV1FVC-Pre 94 %    FEFMax-Pred 6.23 L/sec    FEFMax-Pre 2.41 L/sec    FEFMax-%Pred-Pre 38 %    FEF2575-Pred 2.11 L/sec    FEF2575-Pre 0.91 L/sec    YVI1155-%Pred-Pre 43 %    ExpTime-Pre 4.12 sec    FIFMax-Pre 1.78 L/sec    FEV1FEV6-Pred 81 %    FEV1FEV6-Pre 94 %     PFT interpretation:  Maneuver: valid and meets ATS guidelines  Severe obstruction based on Z score  Compared to prior: FEV1 of 0.80 is 90 ml below prior  The decrease in FVC may represent air trapping v. restrictive physiology.  Lung volumes would be necessary to determine.

## 2024-02-12 NOTE — TELEPHONE ENCOUNTER
CMV 1050 on 2/7/24. Plan to start treatment Valcyte per Ame and have patient see ID.   Treatment dose 450 mg thrice weekly after dialysis. Message sent to patient.

## 2024-02-12 NOTE — TELEPHONE ENCOUNTER
EP called 2/12 to sched a follow up with Dr. Espinoza per Mikayla Latham RN.       ----- Message from Immanuel North RN sent at 2/12/2024  8:37 AM CST -----    ----- Message -----  From: Mikayla Latham RN  Sent: 2/12/2024   8:28 AM CST  To: [facility]    Hi,   This patient needs a follow up with Dr. Espinoza due to recurrent CMV. She has seen her recently. Could you send to your schedulers? Let me know if a new referral is needed. Virtual would be okay too. Thanks!    Mikayla

## 2024-02-13 NOTE — ANESTHESIA PREPROCEDURE EVALUATION
Anesthesia Pre-Procedure Evaluation    Patient: Kecia Blue   MRN: [id number] : 1960        Procedure : Procedure(s):  Flexible, Rigid Bronchoscopy,Tumor/Tissue debulking possible stent using Carbon Dioxide (CO2) laser          Past Medical History:   Diagnosis Date    Acute on chronic respiratory failure with hypoxia (H) 2018    Anisocoria     Antisynthetase syndrome (H24) 2014    Anxiety     Aspergillus (H)     Aspergillus pneumonia (H) 2020    Benign essential hypertension     C. difficile colitis     Cytomegalovirus (CMV) viremia (H)     Dermatomyositis (H)     Dysplasia of cervix, low grade (ESTRADA 1)     EBV (Franklin-Barr virus) viremia     ESRD (end stage renal disease) on dialysis (H)     ILD (interstitial lung disease) (H)     Lung replaced by transplant (H)     Osteopenia     Paroxysmal atrial fibrillation (H)     Pneumocystis jiroveci pneumonia (H)     PONV (postoperative nausea and vomiting)     Post-menopause     Pulmonary hypertension (H)     Raynaud's disease     Seronegative rheumatoid arthritis (H)       Past Surgical History:   Procedure Laterality Date    BRONCHOSCOPY (RIGID OR FLEXIBLE), DIAGNOSTIC N/A 4/10/2018    Procedure: COMBINED BRONCHOSCOPY (RIGID OR FLEXIBLE), LAVAGE;;  Surgeon: Mariposa Donohue MD;  Location: [facility]    BRONCHOSCOPY (RIGID OR FLEXIBLE), DIAGNOSTIC N/A 2020    Procedure: BRONCHOSCOPY, WITH BRONCHOALVEOLAR LAVAGE;  Surgeon: Uri Bass MD;  Location: [facility]    BRONCHOSCOPY (RIGID OR FLEXIBLE), DIAGNOSTIC N/A 2022    Procedure: BRONCHOSCOPY, WITH BRONCHOALVEOLAR LAVAGE;  Surgeon: Uri Bass MD;  Location: [facility]    BRONCHOSCOPY (RIGID OR FLEXIBLE), DIAGNOSTIC N/A 2023    Procedure: BRONCHOSCOPY, WITH BRONCHOALVEOLAR LAVAGE;  Surgeon: Esau Bruno MD;  Location: [facility]    BRONCHOSCOPY (RIGID OR FLEXIBLE), DIAGNOSTIC N/A 2023    Procedure: BRONCHOSCOPY, WITH BRONCHOALVEOLAR LAVAGE;  Surgeon: Beto Magaña  ;  Location: [facility]    BRONCHOSCOPY (RIGID OR FLEXIBLE), DILATE BRONCHUS / TRACHEA N/A 10/11/2018    Procedure: BRONCHOSCOPY (RIGID OR FLEXIBLE), DILATE BRONCHUS / TRACHEA;  Flexible And Rigid Bronchoscopy and Dilation;  Surgeon: Wilber Lin MD;  [facility]    BRONCHOSCOPY (RIGID OR FLEXIBLE), DILATE BRONCHUS / TRACHEA N/A 11/1/2023    Procedure: Bronchoscopy (Rigid Or Flexible), Dilate Bronchus / Trachea;  Surgeon: Beto Magaña DO;  Location: [facility]    BRONCHOSCOPY FLEXIBLE N/A 3/13/2018    Procedure: BRONCHOSCOPY FLEXIBLE;  Flexible Bronchoscopy ;  Surgeon: Gissell Sanchez MD;  Location: [facility]    BRONCHOSCOPY FLEXIBLE N/A 5/9/2018    Procedure: BRONCHOSCOPY FLEXIBLE;;  Surgeon: Wilber Lin MD;  Location: [facility]    BRONCHOSCOPY FLEXIBLE AND RIGID N/A 9/10/2018    Procedure: BRONCHOSCOPY FLEXIBLE AND RIGID;  Flexible and Rigid Bronchoscopy with Balloon Dilation, tissue debulking with cryo, and Right mainstem bronchus stent placement;  Surgeon: Wilber Lin MD;  [facility]    BRONCHOSCOPY RIGID N/A 6/6/2018    Procedure: BRONCHOSCOPY RIGID;;  Surgeon: Lopez Macias MD;  Location: [facility]    BRONCHOSCOPY, DILATE BRONCHUS, STENT BRONCHUS, COMBINED N/A 6/11/2018    Procedure: COMBINED BRONCHOSCOPY, DILATE BRONCHUS, STENT BRONCHUS;  Flexible Bronchoscopy, Balloon Dilation, Bronchial Washings;  Surgeon: Wilber Lin MD;  [facility]    BRONCHOSCOPY, DILATE BRONCHUS, STENT BRONCHUS, COMBINED Right 7/10/2018    Procedure: COMBINED BRONCHOSCOPY, DILATE BRONCHUS, STENT BRONCHUS;  Flexible Bronchoscopy, Balloon Dilation, Bronchial Washings  ;  Surgeon: Wilber Lin MD;  [facility]    BRONCHOSCOPY, DILATE BRONCHUS, STENT BRONCHUS, COMBINED N/A 8/2/2018    Procedure: COMBINED BRONCHOSCOPY, DILATE BRONCHUS, STENT BRONCHUS;  Flexible Bronchoscopy, Bronchial Washings, Balloon Dilation;  Surgeon: Wilber Lin MD;  [facility]     BRONCHOSCOPY, DILATE BRONCHUS, STENT BRONCHUS, COMBINED N/A 8/20/2018    Procedure: COMBINED BRONCHOSCOPY, DILATE BRONCHUS, STENT BRONCHUS;  Flexible Bronchoscopy, Balloon Dilation;  Surgeon: Wilber Lin MD;  [facility]    BRONCHOSCOPY, DILATE BRONCHUS, STENT BRONCHUS, COMBINED N/A 10/29/2018    Procedure: Flexible Bronchoscopy, Balloon Dilation, Stent Revision, Airway Examination And Therapeutic Suctioning, Cyro Tumor Debulking;  Surgeon: Wilber Lin MD;  [facility]    BRONCHOSCOPY, DILATE BRONCHUS, STENT BRONCHUS, COMBINED N/A 11/20/2018    Procedure: Rigid Bronchoscopy, Stent Removal and dilitation;  Surgeon: Wilber Lin MD;  [facility]    BRONCHOSCOPY, DILATE BRONCHUS, STENT BRONCHUS, COMBINED N/A 12/14/2018    Procedure: Flexible And Rigid Bronchoscopy, Balloon Dilation, Bronchial Washing;  Surgeon: Wilber Lin MD;  [facility]    BRONCHOSCOPY, DILATE BRONCHUS, STENT BRONCHUS, COMBINED N/A 1/17/2019    Procedure: Flexible And Rigid Bronchoscopy, Balloon Dilation.;  Surgeon: Wilber Lin MD;  [facility]    BRONCHOSCOPY, DILATE BRONCHUS, STENT BRONCHUS, COMBINED N/A 3/7/2019    Procedure: Flexible and Rigid Bronchoscopy, Bronchial Washing, Balloon Dilation;  Surgeon: Wilber Lin MD;  [facility]    BRONCHOSCOPY, DILATE BRONCHUS, STENT BRONCHUS, COMBINED N/A 6/6/2019    Procedure: Rigid and Flexible Bronchoscopy, Balloon Dilation;  Surgeon: Wilber Lin MD;  [facility]    BRONCHOSCOPY, DILATE BRONCHUS, STENT BRONCHUS, COMBINED N/A 10/11/2019    Procedure: Flexible and Rigid Bronchoscopy, Balloon Dilation, Bronchoalveolar Lagave;  Surgeon: Wilber Lin MD;  [facility]    BRONCHOSCOPY, DILATE BRONCHUS, STENT BRONCHUS, COMBINED N/A 2/19/2021    Procedure: BRONCHOSCOPY, flexible, airway dilation, and bronchial wash;  Surgeon: Wilber Lin MD;  [facility]    BRONCHOSCOPY, DILATE  BRONCHUS, STENT BRONCHUS, COMBINED N/A 4/9/2021    Procedure: BRONCHOSCOPY, flexible and rigid, Airway suctioning;  Surgeon: Mati Norris MD;  [facility]    BRONCHOSCOPY, DILATE BRONCHUS, STENT BRONCHUS, COMBINED N/A 10/17/2023    Procedure: RIGID, flexible bronchoscopy with airway dilation;  Surgeon: Preet Patel MD;  [facility]    CV RIGHT HEART CATH MEASUREMENTS RECORDED N/A 3/10/2020    Procedure: CV RIGHT HEART CATH;  Surgeon: Wai Garcia MD;  [facility]    ENT SURGERY      tonsillectomy as a child    ESOPHAGOSCOPY, GASTROSCOPY, DUODENOSCOPY (EGD), COMBINED N/A 10/29/2018    Procedure: COMBINED ESOPHAGOSCOPY, GASTROSCOPY, DUODENOSCOPY (EGD) with biopsies and polypectomy;  Surgeon: Chente Bloom MD;  [facility]    INSERT EXTRACORPORAL MEMBRANE OXYGENATOR ADULT (OUTSIDE OR) N/A 2/27/2018    Procedure: INSERT EXTRACORPORAL MEMBRANE OXYGENATOR ADULT (OUTSIDE OR);  INSERT EXTRACORPORAL MEMBRANE OXYGENATOR ADULT (OUTSIDE OR) ;  Surgeon: Toby Hernandez MD;  [facility]    IR CVC TUNNEL PLACEMENT > 5 YRS OF AGE  10/25/2019    IR DIALYSIS FISTULOGRAM LEFT  3/2/2021    IR DIALYSIS FISTULOGRAM LEFT  11/2/2023    IR DIALYSIS MECH THROMB, PTA  3/2/2021    IR DIALYSIS PTA  11/2/2023    IR FLUORO 0-1 HOUR  5/7/2021    IR GASTRO JEJUNOSTOMY TUBE PLACEMENT  2/16/2021    IR PARACENTESIS  1/8/2020    IR THORACENTESIS  9/13/2019    IR TRANSCATHETER BIOPSY  1/8/2020    LASER CO2 BRONCHOSCOPY N/A 4/30/2021    Procedure: Flexible and Rigid Bronchoscopy and Tissue Debulking with CO2 Laser Assistance;  Surgeon: Mati Norris MD;  [facility]    LASER CO2 BRONCHOSCOPY N/A 6/11/2021    Procedure: BRONCHOSCOPY, Flexible and Rigid Bronchoscopy, Tissue Debulking with cryo Assistance, airway dilation,;  Surgeon: Mati Norris MD;  [facility]    LASER CO2 BRONCHOSCOPY N/A 9/16/2021    Procedure: BRONCHOSCOPY, flexible and rigid, CO2 Laser  Debulking;  Surgeon: Mati Norris MD;  [facility]    LASER CO2 BRONCHOSCOPY N/A 2/11/2022    Procedure: flexible, rigid bronchoscopy, tissue debulking, airway dilation, co2 laser, bronchoalveolar lavage;  Surgeon: Juana Baugh MD;  [facility]    LASER CO2 BRONCHOSCOPY Right 11/17/2023    Procedure: flexible bronchosocopy, tissue/tumor debulking, using CO2 laser, mitomycin application and argon plasma coagulation;  Surgeon: Mtai Norris MD;  [facility]    no prior surgery      REMOVE EXTRACORPORAL MEMBRANE OXYGENATOR ADULT N/A 3/3/2018    Procedure: REMOVE EXTRACORPORAL MEMBRANE OXYGENATOR ADULT;  Removal of Right Femoral Venous and Right Axillary Arterial Extracorporeal Membrane Oxygenator;  Surgeon: Toby Hernandez MD;  [facility]    TRANSPLANT LUNG RECIPIENT SINGLE X2 Bilateral 3/1/2018    Procedure: TRANSPLANT LUNG RECIPIENT SINGLE X2;  Median Sternotomy, Extracorporeal Membrane Oxygenator, bilateral sequential lung transplant;  Surgeon: Toby Hernandez MD;  [facility]      Allergies   Allergen Reactions    Isopropyl Alcohol Other (See Comments)     The alcohol burns the open areas on her skin when used as a skin prep for procedures    Vancomycin Other (See Comments)     Diffuse erythroderma with itching (improved with Benadryl) after receiving vancomycin over 1 hour. 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 Can use vancomycin in the future, but please give over slower infusion time (at least 2 hours) and premedicate with Benadryl.      Social History     Tobacco Use    Smoking status: Never    Smokeless tobacco: Never   Substance Use Topics    Alcohol use: No     Alcohol/week: 1.0 standard drink of alcohol     Types: 1 Glasses of wine per week      Wt Readings from Last 1 Encounters:   12/18/23 58.1 kg (128 lb)        Anesthesia Evaluation   Pt has had prior anesthetic. Type:  General.    History of anesthetic complications  - PONV.      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   (-) tobacco use, asthma, COPD and sleep apnea   Neurologic: Comment: anisocoria - neg neurologic ROS     Cardiovascular: Comment: The visual ejection fraction is 60-65%.  No regional wall motion abnormalities are seen.  Left ventricular size is normal.  Global right ventricular function/size appear normal (RV is more difficult to  visualize).  No significant valvular abnormalities present.  Mild tricuspid insufficiency is present.  The right ventricular systolic pressure is 45mmHg above the right atrial  pressure.  Pulmonary hypertension is present.      (+)  hypertension- -   -  - -                        dysrhythmias, a-fib,       pulmonary hypertension, Previous cardiac testing   Echo: Date: 10/27/23 Results:  The visual ejection fraction is 60-65%. No regional wall motion abnormalities are seen.  Left ventricular size is normal. Global right ventricular function/size appear normal (RV is more difficult to visualize). No significant valvular abnormalities present. Mild tricuspid insufficiency is present. The right ventricular systolic pressure is 45mmHg above the right atrial pressure. Pulmonary hypertension is present. This study was compared with the study from 7/26/2022. Afib RVR and pulm HTN are new findings.      Stress Test:  Date: 8/2023 Results:  The nuclear stress test is negative for inducible myocardial ischemia or infarction.  Hyperdynamic LV function with LLV EF over 75%.    ECG Reviewed:  Date: 11/2/23 Results:  Sinus rhythm with Premature atrial complexes. QTc 465  Cath:  Date: Results:      METS/Exercise Tolerance:  Comment:  Dermatomyositis with Raynauds   Hematologic:  - neg hematologic  ROS   (+) History of blood clots,     anemia,          Musculoskeletal: Comment: Seronegative rheumatoid arthritis, dermatomyositis, antisynthetase syndrome  Peripheral neuropathy  (+)  arthritis,             GI/Hepatic: Comment: - Congestive hepatopathy with fibrosis      (+) GERD, Asymptomatic on medication,           liver disease,       Renal/Genitourinary:     (+) renal disease, type: ESRD, Pt requires dialysis, type: Hemodialysis,          Endo:  - neg endo ROS   (+)            Chronic steroid usage for Post Transplant Immunosuppression.         Psychiatric/Substance Use:     (+) psychiatric history anxiety       Infectious Disease: Comment: CMV viremia    (+)   MRSA,         Malignancy:  - neg malignancy ROS     Other:            Physical Exam    Airway        Mallampati: II   TM distance: > 3 FB   Neck ROM: full   Mouth opening: > 3 cm    Respiratory Devices and Support         Dental       (+) Minor Abnormalities - some fillings, tiny chips      Cardiovascular   cardiovascular exam normal          Pulmonary   pulmonary exam normal                OUTSIDE LABS:  CBC:   Lab Results   Component Value Date    WBC 2.9 (L) 12/19/2023    WBC 2.9 (L) 12/19/2023    HGB 10.4 (L) 12/19/2023    HGB 9.6 (L) 12/13/2023    HCT 33.5 (L) 12/19/2023    HCT 29.9 (L) 12/13/2023     (L) 12/19/2023     (L) 12/13/2023     BMP:   Lab Results   Component Value Date     12/19/2023     12/13/2023    POTASSIUM 3.7 12/19/2023    POTASSIUM 4.3 12/13/2023    CHLORIDE 100 02/07/2024    CHLORIDE 100 01/31/2024    CO2 31 (H) 12/19/2023    CO2 29 12/13/2023    BUN 20.5 12/19/2023    BUN 41.0 (H) 12/13/2023    CR 3.54 (H) 12/19/2023    CR 4.48 (H) 12/13/2023     (H) 12/19/2023     (H) 12/13/2023     COAGS:   Lab Results   Component Value Date    PTT 28 02/12/2021    INR 0.99 10/24/2023    FIBR 358 02/11/2021     POC:   Lab Results    Component Value Date    BGM 75 06/11/2021    HCG Negative 06/06/2019     HEPATIC:   Lab Results   Component Value Date    ALBUMIN 3.8 11/21/2023    PROTTOTAL 7.3 11/21/2023    ALT 23 11/21/2023    AST 27 11/21/2023     (H) 11/11/2019    ALKPHOS 208 (H) 11/21/2023    BILITOTAL 0.8 11/21/2023    SALAS <10 (L) 01/24/2021     OTHER:   Lab Results   Component Value Date    PH 7.41 02/10/2021    LACT 0.9 10/30/2023    A1C 5.2 05/26/2022    CHELSEY 9.0 12/19/2023    PHOS 3.0 11/04/2023    MAG 1.8 12/19/2023    LIPASE 24 10/25/2023    AMYLASE 58 02/19/2018    TSH 1.23 07/11/2023    CRP 25.0 (H) 10/06/2019    SED 66 (H) 10/29/2023       Anesthesia Plan    ASA Status:  4    NPO Status:  NPO Appropriate    Anesthesia Type: General.     - Airway: ETT   Induction: Intravenous, Propofol.   Maintenance: Balanced.   Techniques and Equipment:     - Airway: Jet ventilation     - Lines/Monitors: BIS     Consents    Anesthesia Plan(s) and associated risks, benefits, and realistic alternatives discussed. Questions answered and patient/representative(s) expressed understanding.     - Discussed: Risks, Benefits and Alternatives for BOTH SEDATION and the PROCEDURE were discussed     - Discussed with:  Patient      - Extended Intubation/Ventilatory Support Discussed: Yes.      - Patient is DNR/DNI Status: No     Use of blood products discussed: No .     Postoperative Care    Pain management: IV analgesics, Oral pain medications, Multi-modal analgesia.   PONV prophylaxis: Ondansetron (or other 5HT-3), Dexamethasone or Solumedrol, Background Propofol Infusion     Comments:               Alexei Christianson MD    I have reviewed the pertinent notes and labs in the chart from the past 30 days and (re)examined the patient.  Any updates or changes from those notes are reflected in this note.

## 2024-02-13 NOTE — TELEPHONE ENCOUNTER
Please refer back to 9/23/2023 telephone encounter and 4/24/23 progress note from Dr. Patel.    Writer reached out to patient to determine if she did proceed with Prolia at outside facility. Patient states that she has not proceeded with Prolia because she has been dealing with a lung infection for a very long time and did not want to get Prolia injection while dealing with infection. Patient notes that Prolia has still been on her mind and she planned to call after lung infection cleared. Patient notes that she meets with her providers tomorrow and will determine if infection has cleared.     Writer will update Dr. Patel.       Naomi Wayne, RN  Endocrine Care Coordinator  [facility]

## 2024-02-14 NOTE — NURSING NOTE
Chief Complaint   Patient presents with    Lung Transplant     TXP follow up      Vitals were taken and medications were reconciled.     Cecille Reed RMA  12:05 PM

## 2024-02-14 NOTE — LETTER
2/14/2024         RE: Kecia Blue  [address] [place] [address]        Dear Colleague,    Thank you for referring your patient, Kecia Blue, to the [facility] [place]. Please see a copy of my visit note below.    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 This is a pre op clearance for OR bronch scheduled for tomorrow.     1. Recurrent right mainstem bronchial stenosis s/p balloon dilation: still with some intermittent wheezing, but denies other complaints. On 2 L O2 continuous.   - Bronch scheduled for tomorrow    2. 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 Is very fatigued during the day, denies headache or worsening confusion. Has started pulmonary rehab. Sating 93% on 2L. DSA 10/27/23 negative. CT chest today reviewed with essentially unchanged opacities and GGOs. PFTs today are stable.   - Recheck ImmuKnow and cf DNA today  - VBG today; will refer to Sleep in [place]  - Continue albuterol nebs PRN and Mucinex BID  - Continue IS including tacrolimus and prednisone; AZA on hold for EBV, also with Immunknow per above  - Bactrim for PJP ppx  CLAD: continue azithromycin, singulair, advair  - Following with Palliative care for GOC     3. EBV viremia: level peaked at 1 million on 11/21, underwent PET scan and saw Heme/Onc and is s/p rituxan last  week. Levels now negative, last on 2/7.   - EBV pending for today    4. 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 Was started on VGCV three times weekly after HD approx 10/6/23. VGCV dosing increased to daily beginning 10/27 given persisting low-level viremia on repeat CMV levels. CMV was negative for about two months, levels because to rise on 1/19.   - Back on valcyte on HD days  - CMV weekend  - Follow up with ID re: recurrence  - ImmuKnow pending per above    5. Left-sided aspergillus empyema:   A. Ochraceus: noted in 2019 s/p needle aspiration with Aspergillus fumigatus on cultures s/p intrapleural amphotericin bead placement. Now with A. Ochraceus on BAL from 11/1. Posaconazole indefinitely per ID. Last level of 1.4 on 11/3.   - Continue posaconazole  - Levels every 6 months     6. ESRD on iHD (since 10/2019):  HTN: continues on iHD M/W/F. BP elevated at 158/75 today in clinic, got restarted on metoprolol.   - Back on metoprolol    7. Leukopenia: WBC of 3.0 today. Likely related viremia and medications.   - AZA on hol  - CMV monitoring per above, will recheck next week     Chronic issues:  1. Paroxysmal AFib  2. Hypomagnesemia  3. Congestive hepatopathy with fibrosis  4. Osteoporosis    RTC: TBD labs and bronch  Vaccinations: got flu, covid and RSV vaccine  Preventative: colonoscopy due 2033; mammogram last Feb negative, follow up?; DEXA > May 2024; following with Derm, needs follow up    Ame Chow PA-C  Pulmonary, Allergy, Critical Care and Sleep Medicine        Interval History:     Since last visit nothing has changed. Started pulmonary rehab last week, Using 2 L all the time, occasionally 3L with walking. No new shortness of breath, cough is stable, does have a wheeze that comes and goes. Patient is quite sleepy during the day, no headaches, minimal confusion per . No chest pain or palpitations, no bloating or gas, stools are normal.           Review of Systems:   Please see HPI,  otherwise the complete 10 point ROS is negative.           Past Medical and Surgical History:     Past Medical History:   Diagnosis Date    Acute on chronic respiratory failure with hypoxia (H) 02/21/2018    Anisocoria     Antisynthetase syndrome (H24) 2014    Anxiety     Aspergillus (H)     Aspergillus pneumonia (H) 11/20/2020    Benign essential hypertension     C. difficile colitis     Cytomegalovirus (CMV) viremia (H)     Dermatomyositis (H)     Dysplasia of cervix, low grade (ESTRADA 1)     EBV (Franklin-Barr virus) viremia     ESRD (end stage renal disease) on dialysis (H)     ILD (interstitial lung disease) (H)     Lung replaced by transplant (H)     Osteopenia     Paroxysmal atrial fibrillation (H)     Pneumocystis jiroveci pneumonia (H)     PONV (postoperative nausea and vomiting)     Post-menopause     Pulmonary hypertension (H)     Raynaud's disease     Seronegative rheumatoid arthritis (H)      Past Surgical History:   Procedure Laterality Date    BRONCHOSCOPY (RIGID OR FLEXIBLE), DIAGNOSTIC N/A 4/10/2018    Procedure: COMBINED BRONCHOSCOPY (RIGID OR FLEXIBLE), LAVAGE;;  Surgeon: Mariposa Donohue MD;  [facility]    BRONCHOSCOPY (RIGID OR FLEXIBLE), DIAGNOSTIC N/A 12/23/2020    Procedure: BRONCHOSCOPY, WITH BRONCHOALVEOLAR LAVAGE;  Surgeon: Uri Bass MD;  [facility]    BRONCHOSCOPY (RIGID OR FLEXIBLE), DIAGNOSTIC N/A 5/26/2022    Procedure: BRONCHOSCOPY, WITH BRONCHOALVEOLAR LAVAGE;  Surgeon: Uri Bass MD;  [facility]    BRONCHOSCOPY (RIGID OR FLEXIBLE), DIAGNOSTIC N/A 8/17/2023    Procedure: BRONCHOSCOPY, WITH BRONCHOALVEOLAR LAVAGE;  Surgeon: Esau Bruno MD;  [facility]    BRONCHOSCOPY (RIGID OR FLEXIBLE), DIAGNOSTIC N/A 11/1/2023    Procedure: BRONCHOSCOPY, WITH BRONCHOALVEOLAR LAVAGE;  Surgeon: Beto Magaña DO;  [facility]    BRONCHOSCOPY (RIGID OR FLEXIBLE), DILATE BRONCHUS / TRACHEA N/A 10/11/2018    Procedure: BRONCHOSCOPY (RIGID OR  FLEXIBLE), DILATE BRONCHUS / TRACHEA;  Flexible And Rigid Bronchoscopy and Dilation;  Surgeon: Wilber Lin MD;  [facility]    BRONCHOSCOPY (RIGID OR FLEXIBLE), DILATE BRONCHUS / TRACHEA N/A 11/1/2023    Procedure: Bronchoscopy (Rigid Or Flexible), Dilate Bronchus / Trachea;  Surgeon: Beto Magaña DO;  [facility]    BRONCHOSCOPY FLEXIBLE N/A 3/13/2018    Procedure: BRONCHOSCOPY FLEXIBLE;  Flexible Bronchoscopy ;  Surgeon: Gissell Sanchez MD;  [facility]    BRONCHOSCOPY FLEXIBLE N/A 5/9/2018    Procedure: BRONCHOSCOPY FLEXIBLE;;  Surgeon: Wilber Lin MD;  [facility]    BRONCHOSCOPY FLEXIBLE AND RIGID N/A 9/10/2018    Procedure: BRONCHOSCOPY FLEXIBLE AND RIGID;  Flexible and Rigid Bronchoscopy with Balloon Dilation, tissue debulking with cryo, and Right mainstem bronchus stent placement;  Surgeon: Wilber Lin MD;  [facility]    BRONCHOSCOPY RIGID N/A 6/6/2018    Procedure: BRONCHOSCOPY RIGID;;  Surgeon: Lopez Macias MD;  [facility]    BRONCHOSCOPY, DILATE BRONCHUS, STENT BRONCHUS, COMBINED N/A 6/11/2018    Procedure: COMBINED BRONCHOSCOPY, DILATE BRONCHUS, STENT BRONCHUS;  Flexible Bronchoscopy, Balloon Dilation, Bronchial Washings;  Surgeon: Wilber Lin MD;  [facility]    BRONCHOSCOPY, DILATE BRONCHUS, STENT BRONCHUS, COMBINED Right 7/10/2018    Procedure: COMBINED BRONCHOSCOPY, DILATE BRONCHUS, STENT BRONCHUS;  Flexible Bronchoscopy, Balloon Dilation, Bronchial Washings  ;  Surgeon: Wilber Lin MD;  [facility]    BRONCHOSCOPY, DILATE BRONCHUS, STENT BRONCHUS, COMBINED N/A 8/2/2018    Procedure: COMBINED BRONCHOSCOPY, DILATE BRONCHUS, STENT BRONCHUS;  Flexible Bronchoscopy, Bronchial Washings, Balloon Dilation;  Surgeon: Wilber Lin MD;  [facility]    BRONCHOSCOPY, DILATE BRONCHUS, STENT BRONCHUS, COMBINED N/A 8/20/2018    Procedure: COMBINED BRONCHOSCOPY, DILATE BRONCHUS, STENT BRONCHUS;   Flexible Bronchoscopy, Balloon Dilation;  Surgeon: Wilber Lin MD;  [facility]    BRONCHOSCOPY, DILATE BRONCHUS, STENT BRONCHUS, COMBINED N/A 10/29/2018    Procedure: Flexible Bronchoscopy, Balloon Dilation, Stent Revision, Airway Examination And Therapeutic Suctioning, Cyro Tumor Debulking;  Surgeon: Wilber Lin MD;  [facility]    BRONCHOSCOPY, DILATE BRONCHUS, STENT BRONCHUS, COMBINED N/A 11/20/2018    Procedure: Rigid Bronchoscopy, Stent Removal and dilitation;  Surgeon: Wilber Lin MD;  [facility]    BRONCHOSCOPY, DILATE BRONCHUS, STENT BRONCHUS, COMBINED N/A 12/14/2018    Procedure: Flexible And Rigid Bronchoscopy, Balloon Dilation, Bronchial Washing;  Surgeon: Wilber Lin MD;  [facility]    BRONCHOSCOPY, DILATE BRONCHUS, STENT BRONCHUS, COMBINED N/A 1/17/2019    Procedure: Flexible And Rigid Bronchoscopy, Balloon Dilation.;  Surgeon: Wilber Lin MD;  [facility]    BRONCHOSCOPY, DILATE BRONCHUS, STENT BRONCHUS, COMBINED N/A 3/7/2019    Procedure: Flexible and Rigid Bronchoscopy, Bronchial Washing, Balloon Dilation;  Surgeon: Wilber Lin MD;  [facility]    BRONCHOSCOPY, DILATE BRONCHUS, STENT BRONCHUS, COMBINED N/A 6/6/2019    Procedure: Rigid and Flexible Bronchoscopy, Balloon Dilation;  Surgeon: Wilber Lin MD;  [facility]    BRONCHOSCOPY, DILATE BRONCHUS, STENT BRONCHUS, COMBINED N/A 10/11/2019    Procedure: Flexible and Rigid Bronchoscopy, Balloon Dilation, Bronchoalveolar Lagave;  Surgeon: Wilber Lin MD;  [facility]    BRONCHOSCOPY, DILATE BRONCHUS, STENT BRONCHUS, COMBINED N/A 2/19/2021    Procedure: BRONCHOSCOPY, flexible, airway dilation, and bronchial wash;  Surgeon: Wilber Lin MD;  [facility]    BRONCHOSCOPY, DILATE BRONCHUS, STENT BRONCHUS, COMBINED N/A 4/9/2021    Procedure: BRONCHOSCOPY, flexible and rigid, Airway suctioning;  Surgeon: Mati Norris,  MD;  [facility]    BRONCHOSCOPY, DILATE BRONCHUS, STENT BRONCHUS, COMBINED N/A 10/17/2023    Procedure: RIGID, flexible bronchoscopy with airway dilation;  Surgeon: Preet Patel MD;  [facility]    CV RIGHT HEART CATH MEASUREMENTS RECORDED N/A 3/10/2020    Procedure: CV RIGHT HEART CATH;  Surgeon: Wai Garcia MD;  [facility]    ENT SURGERY      tonsillectomy as a child    ESOPHAGOSCOPY, GASTROSCOPY, DUODENOSCOPY (EGD), COMBINED N/A 10/29/2018    Procedure: COMBINED ESOPHAGOSCOPY, GASTROSCOPY, DUODENOSCOPY (EGD) with biopsies and polypectomy;  Surgeon: Chente Bloom MD;  [facility]    INSERT EXTRACORPORAL MEMBRANE OXYGENATOR ADULT (OUTSIDE OR) N/A 2/27/2018    Procedure: INSERT EXTRACORPORAL MEMBRANE OXYGENATOR ADULT (OUTSIDE OR);  INSERT EXTRACORPORAL MEMBRANE OXYGENATOR ADULT (OUTSIDE OR) ;  Surgeon: Toby Hernandez MD;  [facility]    IR CVC TUNNEL PLACEMENT > 5 YRS OF AGE  10/25/2019    IR DIALYSIS FISTULOGRAM LEFT  3/2/2021    IR DIALYSIS FISTULOGRAM LEFT  11/2/2023    IR DIALYSIS MECH THROMB, PTA  3/2/2021    IR DIALYSIS PTA  11/2/2023    IR FLUORO 0-1 HOUR  5/7/2021    IR GASTRO JEJUNOSTOMY TUBE PLACEMENT  2/16/2021    IR PARACENTESIS  1/8/2020    IR THORACENTESIS  9/13/2019    IR TRANSCATHETER BIOPSY  1/8/2020    LASER CO2 BRONCHOSCOPY N/A 4/30/2021    Procedure: Flexible and Rigid Bronchoscopy and Tissue Debulking with CO2 Laser Assistance;  Surgeon: Mati Norris MD;  [facility]    LASER CO2 BRONCHOSCOPY N/A 6/11/2021    Procedure: BRONCHOSCOPY, Flexible and Rigid Bronchoscopy, Tissue Debulking with cryo Assistance, airway dilation,;  Surgeon: Mati Norris MD;  [facility]    LASER CO2 BRONCHOSCOPY N/A 9/16/2021    Procedure: BRONCHOSCOPY, flexible and rigid, CO2 Laser Debulking;  Surgeon: Mati Norris MD;  [facility]    LASER CO2 BRONCHOSCOPY N/A 2/11/2022    Procedure: flexible, rigid bronchoscopy,  tissue debulking, airway dilation, co2 laser, bronchoalveolar lavage;  Surgeon: Juana Baugh MD;  [facility]    LASER CO2 BRONCHOSCOPY Right 2023    Procedure: flexible bronchosocopy, tissue/tumor debulking, using CO2 laser, mitomycin application and argon plasma coagulation;  Surgeon: Mati Norris MD;  Location:  OR    no prior surgery      REMOVE EXTRACORPORAL MEMBRANE OXYGENATOR ADULT N/A 3/3/2018    Procedure: REMOVE EXTRACORPORAL MEMBRANE OXYGENATOR ADULT;  Removal of Right Femoral Venous and Right Axillary Arterial Extracorporeal Membrane Oxygenator;  Surgeon: Toby Hernandez MD;  [facility]    TRANSPLANT LUNG RECIPIENT SINGLE X2 Bilateral 3/1/2018    Procedure: TRANSPLANT LUNG RECIPIENT SINGLE X2;  Median Sternotomy, Extracorporeal Membrane Oxygenator, bilateral sequential lung transplant;  Surgeon: Toby Hernandez MD;  [facility]           Family History:     Family History   Problem Relation Age of Onset    Hypertension Mother     Arthritis Mother     Pancreatic Cancer Father     Diabetes Father             Social History:     Social History     Socioeconomic History    Marital status:      Spouse name: Not on file    Number of children: Not on file    Years of education: Not on file    Highest education level: Not on file   Occupational History    Not on file   Tobacco Use    Smoking status: Never    Smokeless tobacco: Never   Substance and Sexual Activity    Alcohol use: No     Alcohol/week: 1.0 standard drink of alcohol     Types: 1 Glasses of wine per week    Drug use: No    Sexual activity: Not on file   Other Topics Concern    Parent/sibling w/ CABG, MI or angioplasty before 65F 55M? No   Social History Narrative    3/6/2019 - Lives with . Has three daughters. Four grandchildren (two ). No pets. Travelled previously to [facility]. Has visited [place] several times.      Social Determinants of Health     Financial Resource  Strain: Not on file   Food Insecurity: Not on file   Transportation Needs: Not on file   Physical Activity: Not on file   Stress: Not on file   Social Connections: Not on file   Interpersonal Safety: Not on file   Housing Stability: Not on file            Medications:     Current Outpatient Medications   Medication    metoprolol tartrate (LOPRESSOR) 25 MG tablet    acetaminophen (TYLENOL) 325 MG tablet    acetylcysteine (MUCOMYST) 10 % nebulizer solution    albuterol (PROVENTIL) (2.5 MG/3ML) 0.083% neb solution    azithromycin (ZITHROMAX) 250 MG tablet    benzonatate (TESSALON) 100 MG capsule    cholecalciferol 50 MCG (2000 UT) CAPS    fluticasone-salmeterol (ADVAIR) 250-50 MCG/ACT inhaler    guaiFENesin (MUCINEX) 600 MG 12 hr tablet    Magnesium Cl-Calcium Carbonate (SLOW-MAG) 71.5-119 MG TBEC    montelukast (SINGULAIR) 10 MG tablet    pantoprazole (PROTONIX) 40 MG EC tablet    posaconazole (NOXAFIL) 100 MG EC tablet    predniSONE (DELTASONE) 5 MG tablet    sulfamethoxazole-trimethoprim (BACTRIM) 400-80 MG tablet    tacrolimus (GENERIC) 1 mg/mL suspension    valGANciclovir (VALCYTE) 450 MG tablet     No current facility-administered medications for this visit.            Physical Exam:   BP (!) 158/75 (BP Location: Right arm, Patient Position: Sitting)   Pulse 77   LMP 06/07/2014 (Exact Date)   SpO2 93%     GENERAL: alert, NAD  HEENT: NCAT, EOMI, no scleral icterus, oral mucosa moist and without lesions  Neck: no cervical or supraclavicular adenopathy  Lungs: moderate airflow, scattered crackles and rhonchi  CV: irregular rhythm, S1S2, no murmurs noted  Abdomen: normoactive BS, soft  Lymph: no edema  Neuro: AAO X 3, CN 2-12 grossly intact  Psychiatric: normal affect, good eye contact  Skin: no rash, jaundice or lesions on limited exam         Data:   All laboratory and imaging data reviewed.      Recent Results (from the past 168 hour(s))   Magnesium    Collection Time: 02/14/24 10:50 AM   Result Value Ref Range     Magnesium 2.0 1.7 - 2.3 mg/dL   CBC with platelets    Collection Time: 02/14/24 10:50 AM   Result Value Ref Range    WBC Count 3.0 (L) 4.0 - 11.0 10e3/uL    RBC Count 3.59 (L) 3.80 - 5.20 10e6/uL    Hemoglobin 11.2 (L) 11.7 - 15.7 g/dL    Hematocrit 35.8 35.0 - 47.0 %     78 - 100 fL    MCH 31.2 26.5 - 33.0 pg    MCHC 31.3 (L) 31.5 - 36.5 g/dL    RDW 16.8 (H) 10.0 - 15.0 %    Platelet Count 142 (L) 150 - 450 10e3/uL   INR    Collection Time: 02/14/24 10:50 AM   Result Value Ref Range    INR 0.96 0.85 - 1.15   General PFT Lab (Please always keep checked)    Collection Time: 02/14/24 10:56 AM   Result Value Ref Range    FVC-Pred 2.88 L    FVC-Pre 0.85 L    FVC-%Pred-Pre 29 %    FEV1-Pre 0.80 L    FEV1-%Pred-Pre 34 %    FEV1FVC-Pred 80 %    FEV1FVC-Pre 94 %    FEFMax-Pred 6.23 L/sec    FEFMax-Pre 2.41 L/sec    FEFMax-%Pred-Pre 38 %    FEF2575-Pred 2.11 L/sec    FEF2575-Pre 0.91 L/sec    PCU4410-%Pred-Pre 43 %    ExpTime-Pre 4.12 sec    FIFMax-Pre 1.78 L/sec    FEV1FEV6-Pred 81 %    FEV1FEV6-Pre 94 %     PFT interpretation:  Maneuver: valid and meets ATS guidelines  Severe obstruction based on Z score  Compared to prior: FEV1 of 0.80 is 90 ml below prior  The decrease in FVC may represent air trapping v. restrictive physiology.  Lung volumes would be necessary to determine.    Transplant Coordinator Note    Reason for visit: Post lung transplant follow up visit   Coordinator: Present   Caregiver:  , Ranjan    Health concerns addressed today:  1. Respiratory: 2L oxygen, stable. No new shortness of breath or cough.  2. GI: no acid reflux  3. Immunknow/Prospera/VBG today  4. Bronch tomorrow in the OR    Activity/rehab: Walking everyday for 5-10 minutes.   Oxygen needs: 2L NC continuous.   Pain management/RX: Denies  High risk donor: Yes  CMV status: D+/R+  DVT/PE: H/o DVT  Post op AFIB/follow up with EP: One time occurrence of a-fib  PJP prophylactic: Bactrim    COVID:  COVID-19 infection (yes/no, date of  most recent positive test):   Status/instructions given about COVID-19 vaccine:     Pt Education: medications (use/dose/side effects), how/when to call coordinator, frequency of labs, s/s of infection/rejection, call prior to starting any new medications, lab/vital sign book    Health Maintenance:   Last colonoscopy:   Next colonoscopy due:   Dermatology:  Vaccinations this visit:     Labs, CXR, PFTs reviewed with patient  Medication record reviewed and reconciled  Questions and concerns addressed    Patient Instructions  1. Continue to hydrate with 60-70 oz fluids daily.  2. Continue to exercise daily or most days of the week.  3. Follow up with your primary care provider for annual gender health maintenance procedures.  4. Follow up with colonoscopy schedule.  5. Follow up with annual dermatology visits.  6. It doesn't seem like the COVID vaccine is working well in lung transplant patients. A number of lung transplant patients have gotten sick with COVID even after receiving the vaccines. Based on our recent experience, it can be life-threatening to get COVID  even after being vaccinated. Please continue to act like you did not get the COVID vaccine - social distancing, wearing a mask, good hand hygiene, etc. If the people around you are vaccinated, it will help reduce the risk of you getting COVID. All members of your household should be vaccinated.  7. We will send a referral for you to see sleep closer to home.    Next transplant clinic appointment:  TBD pending bronch with CXR, labs and PFTs  Next lab draw: weekly    AVS printed at time of check out           Ame Chow PA-C

## 2024-02-14 NOTE — PATIENT INSTRUCTIONS
Patient Instructions  1. Continue to hydrate with 60-70 oz fluids daily.  2. Continue to exercise daily or most days of the week.  3. Follow up with your primary care provider for annual gender health maintenance procedures.  4. Follow up with colonoscopy schedule.  5. Follow up with annual dermatology visits.  6. It doesn't seem like the COVID vaccine is working well in lung transplant patients. A number of lung transplant patients have gotten sick with COVID even after receiving the vaccines. Based on our recent experience, it can be life-threatening to get COVID  even after being vaccinated. Please continue to act like you did not get the COVID vaccine - social distancing, wearing a mask, good hand hygiene, etc. If the people around you are vaccinated, it will help reduce the risk of you getting COVID. All members of your household should be vaccinated.  7. We will send a referral for you to see sleep closer to home.    Next transplant clinic appointment: TBD pending bronch with CXR, labs and PFTs  Next lab draw: weekly    ~~~~~~~~~~~~~~~~~~~~~~~~~    Thoracic Transplant Office phone [phone] (alt [phone]), fax [phone]    Office Hours 8:30 - 5:00     For after-hours urgent issues, please dial [phone] (alt [phone]) and ask the  to page the Thoracic Transplant Coordinator On-Call.   --------------------  To expedite your medication refill(s), please contact your pharmacy and have them fax a refill request to: [phone]    *Please allow 3 business days for routine medication refills.  *Please allow 5 business days for controlled substance medication refills.    **For Diabetic medications and supplies refill(s), please contact your pharmacy and have them contact your Endocrine team.  --------------------  For scheduling appointments call [phone] (alt [phone])  --------------------  Please Note: If you are active on [employer], all future test results will be sent by [employer]  message only, and will no longer be called to patient. You may also receive communication directly from your physician.

## 2024-02-14 NOTE — PROGRESS NOTES
Transplant Coordinator Note    Reason for visit: Post lung transplant follow up visit   Coordinator: Present   Caregiver:  Ranjan    Health concerns addressed today:  1. Respiratory: 2L oxygen, stable. No new shortness of breath or cough.  2. GI: no acid reflux  3. Immunknow/Prospera/VBG today  4. Bronch tomorrow in the OR    Activity/rehab: Walking everyday for 5-10 minutes.   Oxygen needs: 2L NC continuous.   Pain management/RX: Denies  High risk donor: Yes  CMV status: D+/R+  DVT/PE: H/o DVT  Post op AFIB/follow up with EP: One time occurrence of a-fib  PJP prophylactic: Bactrim    COVID:  COVID-19 infection (yes/no, date of most recent positive test):   Status/instructions given about COVID-19 vaccine:     Pt Education: medications (use/dose/side effects), how/when to call coordinator, frequency of labs, s/s of infection/rejection, call prior to starting any new medications, lab/vital sign book    Health Maintenance:   Last colonoscopy:   Next colonoscopy due:   Dermatology:  Vaccinations this visit:     Labs, CXR, PFTs reviewed with patient  Medication record reviewed and reconciled  Questions and concerns addressed    Patient Instructions  1. Continue to hydrate with 60-70 oz fluids daily.  2. Continue to exercise daily or most days of the week.  3. Follow up with your primary care provider for annual gender health maintenance procedures.  4. Follow up with colonoscopy schedule.  5. Follow up with annual dermatology visits.  6. It doesn't seem like the COVID vaccine is working well in lung transplant patients. A number of lung transplant patients have gotten sick with COVID even after receiving the vaccines. Based on our recent experience, it can be life-threatening to get COVID  even after being vaccinated. Please continue to act like you did not get the COVID vaccine - social distancing, wearing a mask, good hand hygiene, etc. If the people around you are vaccinated, it will help reduce the risk of  you getting COVID. All members of your household should be vaccinated.  7. We will send a referral for you to see sleep closer to home.    Next transplant clinic appointment:  TBD pending bronch with CXR, labs and PFTs  Next lab draw: weekly    AVS printed at time of check out

## 2024-02-15 NOTE — ANESTHESIA POSTPROCEDURE EVALUATION
Patient: Kecia Bleu    Procedure: Procedure(s):  Flexible, Rigid Bronchoscopy,Tumor/Tissue debulking using Carbon Dioxide (CO2) laser; balloon dilation       Anesthesia Type:  General    Note:  Disposition: Outpatient   Postop Pain Control: Uneventful            Sign Out: Well controlled pain   PONV: No   Neuro/Psych: Uneventful            Sign Out: Acceptable/Baseline neuro status   Airway/Respiratory: Uneventful            Sign Out: Acceptable/Baseline resp. status   CV/Hemodynamics: Uneventful            Sign Out: Acceptable CV status; No obvious hypovolemia; No obvious fluid overload   Other NRE: NONE   DID A NON-ROUTINE EVENT OCCUR? No           Last vitals:  Vitals Value Taken Time   /67 02/15/24 0850   Temp 36.6  C (97.9  F) 02/15/24 0841   Pulse 66 02/15/24 0852   Resp 18 02/15/24 0841   SpO2 96 % 02/15/24 0852   Vitals shown include unfiled device data.    Electronically Signed By: Billy Russell MD  February 15, 2024  8:53 AM

## 2024-02-15 NOTE — PROCEDURES
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  The indication for the procedure was reviewed.  The necessary history and physical examination was performed and reviewed.  The risks, benefits and alternatives of the procedure were discussed with the the patient in detail and she had the opportunity to ask questions.  I discussed in particular the potential complications including risks of minor or life-threatening bleeding and/or infection, respiratory failure, vocal cord trauma / paralysis, pneumothorax, and discomfort. Sedation risks were also discussed including abnormal heart rhythms, low blood pressure, and respiratory failure. All questions were answered to the best of my ability.  Verbal and written informed consent was obtained.  The proposed procedure and the patient's identification were verified prior to the procedure by the physician and the nurse.    The patient was assessed for the adequacy for the procedure and to receive medications.   Mental Status:  Alert and oriented x 3  Airway examination:  Class I (Complete visualization of soft palate)  Pulmonary:  Non labored respirations  CV:  Regular rate  ASA Grade:  (II)  Mild systemic disease    After clinical evaluation and reviewing the indication, risks, alternatives and benefits of the procedure the patient was deemed to be in satisfactory condition to undergo  the procedure.           A Tuberculosis risk assessment was performed:  The patient has no known RISK of Tuberculosis    The procedure was performed in a negative airflow room: The patient could not be moved to a negative airflow room because of needed OR for the procedure    Maneuvers / Procedure:      A Flexible and 8.5mm Rigid bronchoscope bronchoscope was used for the procedure. The rigid bronchoscope was inserted into the mouth. Uvula, epiglottis and vocal cords were seen. The scope was advanced turning the bevel to 90 degress while passing through the cords and into the trachea.     Airway Examination: A complete airway examination was performed from the distal trachea to the subsegmental level in each lobe of both lungs.  Pertinent findings include normal looking LMB anastomosis, there was mild stenosis of RMB anastomosis and moderate in BI. I could not enter with therapeutic scope into BI.       Airway dilation: Airway dilation#1: The Bronchus intermedius  airway was dilated to 10mm . Each dilation was held for 60sec and repeated 3 times     Bronchial washing: Right mainstem and RLL was washed and sent for analysis.     Tumor/Tissue Debulking: The Bronchus intermedius airway was accessed and tumor/tissue debulking was performed using the Laser. Hemostasis and patency of the airway was acheived post-debulking.    Any disposable equipment was visually inspected and deemed to be intact immediately post procedure.      Relevant Pictures  Main gee        RMB anastomosis        RUL         BI stenosis and malacia        Laser debulking BI        Balloon dilation BI        RML and RLL bronchi        LMB anastomosis        LC2        Assessment and Recommendations:     Successful completion of rigid and flexible bronchoscopy with laser tissue debulking of BI and balloon dilation  Ok to discharge once medically stable.   Follow up bronchoscopy in 3 months          Hayden Lingjose  Interventional pulmonary  fellow  Pager: [phone]

## 2024-02-15 NOTE — RESULT ENCOUNTER NOTE
Armand Mcdonnell,   Your tacrolimus level was 7.9 at 12 hours on 2/14/24 which is within your goal range of 8-10. No dose change at this time. Please call the transplant office ([phone]) with any questions.   Thanks,   Mikayla

## 2024-02-15 NOTE — ANESTHESIA CARE TRANSFER NOTE
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    Anesthesia Type:   General     Note:    Oropharynx: oropharynx clear of all foreign objects and spontaneously breathing  Level of Consciousness: awake  Oxygen Supplementation: face mask  Level of Supplemental Oxygen (L/min / FiO2): 6  Independent Airway: airway patency satisfactory and stable  Dentition: dentition unchanged  Vital Signs Stable: post-procedure vital signs reviewed and stable  Report to RN Given: handoff report given  Patient transferred to: PACU    Handoff Report: Identifed the Patient, Identified the Reponsible Provider, Reviewed the pertinent medical history, Discussed the surgical course, Reviewed Intra-OP anesthesia mangement and issues during anesthesia, Set expectations for post-procedure period and Allowed opportunity for questions and acknowledgement of understanding      Vitals:  Vitals Value Taken Time   /66 02/15/24 0842   Temp     Pulse 65 02/15/24 0849   Resp     SpO2 96 % 02/15/24 0849   Vitals shown include unfiled device data.    Electronically Signed By: Alexei Christianson MD  February 15, 2024  8:50 AM

## 2024-02-15 NOTE — DISCHARGE INSTRUCTIONS
Post Bronchoscopy Patient Instructions:    February 15, 2024  Kecia VILLAFANA [employer]ed[employer]    Your procedure completed (bronchoscopy with right airway lase tissue debulking and balloon dilation) without any immediate complications.  You may cough up scant amount of blood for the next 12-24 hours. If you have excessive cough with blood, chest pain, shortness of breath, please report to the closest emergency room.    You may experience low grade (less than 100.5 F) fever next 24 hours, if so, you can take Tylenol. If the fever persists more than 24 hours, please contact to our office or your primary care provider.    You may resume your diet as it was prior to procedure.    You may resume your medications after the procedure unless you are instructed to do differently.     Please follow instructions from the nursing staff upon discharge in terms of activity. In general, you should avoid any attention or motor skill requiring activities (e.g., driving or operating any motorized vehicle) for 24 hours as you might be still under the effect of sedation medications. Please make sure an adult to accompany you next 24 hours.     Should you have any question, please do not hesitate to call our office.    Hayden Lou MD    Contacting your Doctor -   To contact a doctor, call Dr. Lin's clinic  at [phone] or:  [phone] and ask for the resident on call for Pulmonology (answered 24 hours a day)   Emergency Department:  [facility]: [phone]  [facility]: [phone] 911 if you are in need of immediate or emergent help

## 2024-02-15 NOTE — LETTER
PHYSICIAN ORDERS      DATE & TIME ISSUED: February 15, 2024 4:33 PM  PATIENT NAME: Kecia Blue   : 1960     [facility] MR# [if applicable]: [id number]     DIAGNOSIS:  Lung Transplant  Z94.2    Referral for patient to see Sleep Medicine for positive pressure options.        Any questions please call: Lovely [phone]    Please fax these results to [phone].        Ame Chow PA-C

## 2024-02-19 NOTE — RESULT ENCOUNTER NOTE
Per Ame regarding 2/14/24 ImmuKnow: Kecia's immuknow is on the low end. Given her recurrent CMV, let's decrease her tac goal. I think it's 8-10, correct? Let's drop her to 7-9.    Message sent to patient the plan.

## 2024-02-20 NOTE — TELEPHONE ENCOUNTER
Spoke with Kecia regarding 3/5 appointments and seeing if she can come draw another [facility] lab (its high but given recent high dose steroids Ame Chow wants to recheck). Please disregard message from yesterday in regards to your Tacrolimus goal. Please keep at 8-10.

## 2024-02-28 NOTE — TELEPHONE ENCOUNTER
DATE:  2/28/2024     TIME OF RECEIPT FROM LAB:  9:28 AM ([facility])    ORDERING PROVIDER: Ame Chow    LAB TEST:  WBC    LAB VALUE:  1.7

## 2024-03-01 NOTE — TELEPHONE ENCOUNTER
"Received a call with critical lab result from today drawn at [facility]: WBC 0.5  Lab also reported \"delta check\" for Hgb 11.4 as last result was 15.7.  Reported this to Dyana Lee nurse covering for post lung coordinator today and she will follow-up on result.   "

## 2024-03-01 NOTE — TELEPHONE ENCOUNTER
WBC is now down to 0.5. Dr. Graham advised neupogen x1.   Kecia is agreeable to medication but not comfortable giving at home.     Pharmacy not able to give, calling home clinic to see if appointment available.

## 2024-03-02 NOTE — TELEPHONE ENCOUNTER
Patient contacted on-call transplant coordinator reporting issues with getting medication.     Per chart review patient was prescribed 300 mcg/1ML Neupogen for low white blood count.  Patient instructed to pick medication up at her local pharmacy and bring to [place], [place] to have injection at infusion center.     Per patient local [facility] pharmacy does not carry this medication.  Pharmacist at [facility] confirmed they do not carry Neupogen, and would like confirmation to rewrite order for Granix.  Contacted console to specialty pharmacy who confirmed patient has had Granix in the past.  New order for Granix 300 mcg/1mL verbal order given to local pharmacist.     Placed a follow-up call to patient to notify her medication issues have been resolved.  Message sent to transplant pulmonologist attending inpatient to advise timing of follow-up labs.  Patient currently has lab appointment for 3/5/2024.

## 2024-03-05 NOTE — PROCEDURES
[facility]    Procedure: IR Procedure Note    Date/Time: 3/5/2024 1:18 PM    Performed by: Jenna Briones DO  Authorized by: Edin Rodriguez MD      UNIVERSAL PROTOCOL   Site Marked: NA  Prior Images Obtained and Reviewed:  Yes  Required items: Required blood products, implants, devices and special equipment available    Patient identity confirmed:  Verbally with patient, arm band, provided demographic data and hospital-assigned identification number  Patient was reevaluated immediately before administering moderate or deep sedation or anesthesia  Confirmation Checklist:  Patient's identity using two indicators, relevant allergies, procedure was appropriate and matched the consent or emergent situation and correct equipment/implants were available  Time out: Immediately prior to the procedure a time out was called    Universal Protocol: the Joint Commission Universal Protocol was followed    Preparation: Patient was prepped and draped in usual sterile fashion    ESBL (mL):  5     ANESTHESIA    Anesthesia:  Local infiltration  Local Anesthetic:  Lidocaine 1% without epinephrine  Anesthetic Total (mL):  1      SEDATION  Patient Sedated: Yes    Sedation Type:  Moderate (conscious) sedation  Sedation:  Fentanyl and midazolam  Vital signs: Vital signs monitored during sedation    See dictated procedure note for full details.  Findings: LUE fistulogram with innominate and cephalic stenosis.  Innominate and cephalic stenosis plastied. No immediate complications    Specimens: none    Complications: None    Condition: Stable    Plan: -1 hour bedrest  - IR to remove woggle      PROCEDURE    Patient Tolerance:  Patient tolerated the procedure well with no immediate complications  Length of time physician/provider present for 1:1 monitoring during sedation: 45

## 2024-03-05 NOTE — IR NOTE
Patient Name: Kecia Blue  Medical Record Number: [id number]  Today's Date: 3/5/2024    Procedure: left arm fistulogram with balloon plasty  Proceduralist: Ami Dias    Procedure Start: 1239  Procedure end: 1316  Sedation medications administered: versed 3  mg., hydromorphone  0.6 mg.     Report given to: Lorna HOUSTON RN    Other Notes: Pt arrived to IR room 1   from . Consent reviewed. Pt denies any questions or concerns regarding procedure. Pt positioned supine and monitored per protocol. Pt tolerated procedure without any noted complications. Pt transferred back to . Woggle applied to puncture, will check in 1 hour.

## 2024-03-05 NOTE — TELEPHONE ENCOUNTER
WBC 1, ANC 0.2 today. Per Ame, give Neupogen daily x2. 2A will give her a dose today. Order faxed to her local infusion center for tomorrow.   If CMV negative today, okay to reduce Valcyte to prophylactic dose until patient sees ID next week.

## 2024-03-05 NOTE — DISCHARGE INSTRUCTIONS
[facility]      Interventional Radiology  Discharge Instructions Following Fistulogram      You may resume normal activities as tolerated, but avoid any strenuous activity or heavy lifting (>10 lbs.) involving your access arm.    Elevate and rest your arm as much as possible for the first 24 hours after the procedure.  This will promote healing and reduce swelling.     If bleeding or oozing should occur, apply fingertip pressure to puncture site to control bleeding, but avoid excessive pressure as this may clot off the fistula.  Hold light pressure for 10 minutes, or until bleeding/oozing is controlled.  If excessive bleeding is noted or if you are having difficulty controlling the bleeding with direct pressure, call 911.     If you develop fever, chills, excessive pain/tenderness or drainage at the puncture site, or have questions call your Doctor or Dialysis Center.     If you received sedation for your procedure: An adult should stay with you for 24 hours, Do Not drive a motor vehicle, operate machinery, or drink alcoholic beverages for 24 hours.     You may resume your normal medications immediately    If you notice sudden loss of pulse or thrill (buzzing feeling) in your fistula, contact your Doctor or Dialysis unit immediately.     Additional Instructions:None    [facility] INTERVENTIONAL RADIOLOGY DEPARTMENT  Procedure Physician:         Dr. Briones/Dr. Rodriguez Date of procedure: March 5, 2024  Telephone Numbers: [phone] Monday-Friday 7:30 am to 4:00 pm  [phone] After 4:00 pm Monday-Friday, Weekends & Holidays. Ask for the Interventional Radiologist on call.  Someone is on call 24 hrs/day  [facility] toll free number: [phone] Monday-Friday 8:00 am to 4:30 pm  [facility] Emergency Dept: [phone]

## 2024-03-05 NOTE — LETTER
PHYSICIAN ORDERS      DATE & TIME ISSUED: 2024 11:45 AM  PATIENT NAME: Kecia Blue   : 1960     [facility] MR# [if applicable]: [id number]     DIAGNOSIS:  Lung Transplant  Z94.2, neutropenia     Please give 300 mcg Granix subcutaneous injection once on 3/6/24.     Any questions please call: Mikayla     Please fax these results to [phone].        Ame Chow PA-C

## 2024-03-05 NOTE — PRE-PROCEDURE
GENERAL PRE-PROCEDURE:   Procedure:  Left upper extremity fistulogram with possible intervention    Written consent obtained?: Yes    Risks and benefits: Risks, benefits and alternatives were discussed    Consent given by:  Patient  Patient states understanding of procedure being performed: Yes    Patient's understanding of procedure matches consent: Yes    Procedure consent matches procedure scheduled: Yes    Expected level of sedation:  Moderate  Appropriately NPO:  Yes  ASA Class:  3  Mallampati  :  Grade 1- soft palate, uvula, tonsillar pillars, and posterior pharyngeal wall visible  Lungs:  Wheezes  Heart:  Normal heart sounds and rate  History & Physical reviewed:  History and physical reviewed and no updates needed  Statement of review:  I have reviewed the lab findings, diagnostic data, medications, and the plan for sedation

## 2024-03-05 NOTE — TELEPHONE ENCOUNTER
Called to 2A pre procedure RN for her fistulogram today in IR.  Patient's ANC count is down to 0.2 and white blood cells are down to 1.  Patient received Neupogen injection on Saturday and they are still this level.  Asked if RN could give patient one-time dose of Neupogen 300 mcg/mL.  Give 1 ml Subcutaneous for low neutrophil and white blood cell count.  Order placed under Ame Chow.

## 2024-03-05 NOTE — PROGRESS NOTES
Pt tolerated recovery without complication. Woggle removed by Dr. Briones, dressing applied. Site remains f/d/I. No pain. Discharge instructions reviewed, copy given to pt. Pt tolerated oral intake and ambulation. NADEEM saul'brendan. Pt discharged home accompanied by .

## 2024-03-07 NOTE — TELEPHONE ENCOUNTER
[facility] Call Center    Phone Message    May a detailed message be left on voicemail: yes     Reason for Call: Other: Caller, [facility], is requesting a call back to discuss prior authorization for filgrastim (NEUPOGEN) 300 MCG/ML injection. Call back #: [phone]. Rx: [id number] Store #: [id number]. Please advise.     Action Taken: Other: PULM    Travel Screening: Not Applicable

## 2024-03-12 NOTE — PROGRESS NOTES
CVICU PROGRESS NOTE  03/07/2018    ASSESSMENT: Kecia Blue is a 57 year old female with PMH significant for dermatomyositis on immunosuppression, eronegative RA, ILD, and pulmonary hypertension, who was admitted for emergent lung transplant work-up on 2/21 for increasing shortness of breath and hypoxia now s/p emergent [facility] ECMO placement on 2/27/18. Now s/p bilateral lung transplant on 3/1 and [facility] ECMO decannulation on 3/3 with Dr. Hernandez.      TODAY'S PROGRESS/PLAN  - Speech evaluation  - Metoprolol from 12.5mg BID-> 25 BID  - D/c Frederic and femoral arterial line  - Goal net neg 1.0L-> 1.5L  - Lasix 20mg BID  - LUE DVT noted on ultrasound    PLAN:  Neuro/ pain/ sedation:  # Postoperative acute pain  # Sedation for mechanical ventilation  - Monitor neurological status. Notify the MD for any acute changes in exam.  - Dilaudid gtt for pain. Transition to PO/IV prn pain meds.   - Propofol gtt for sedation.   - Paravertebral catheters after extubation    Pulmonary care:  # Acute on chronic respiratory failure, worsening, s/p bilateral lung transplantation 3/1/2018  # ILD related to dermatomyositis   # Severe pulmonary hypertension (WHO class III)  # Pneumomediastinum, 2/22/2018  # [facility] ECMO decannulated 3/3   - Supplemental oxygen via ventilator to keep saturation above 92 %.  - Flolan, wean off today  - Nitric oxide weaned off 3/1  - Bronch today  - Extubate likely tomorrow    Cardiovascular:  # Right heart failure  # Right ventricular hypertrophy  # Pulmonary HTN  # Postoperative atrial fibrillation with RVR - sinus between  - Monitor hemodynamic status.   - Start Metoprolol 12.5 TID->25mg BID    GI care:  #Constipation - having BMs  - NPO. OG.  - NJ placed  - Bowel regimen    Fluids/ Electrolytes/ Nutrition:  #Hypernatremia   - Advance TFs to goal  - ICU electrolyte protocol  - D5W at 50cc/hr  - FWF at 60cc/2 hours    Renal/ Fluid Balance:  #Acute kidney injury  - Urine output is adequate so far.  - Cr  improving  - Diurese with lasix for net negative goal 500-750mL negative  - Will continue to monitor intake and output.    Endocrine:  #Stress hyperglycemia    - Insulin gtt     ID/ Antibiotics:  #Febrile  - Anti-rejection and immunosuppressant medications per transplant team  - Donor lungs with MRSA, coverage for 2 weeks with vanco/zosyn continued with increased fever  - Unasyn added for culture stopped after one dose to continue broad culture  - Pan culture 3/4 - NGTD  - Follow 3/6 BAL cultures    Heme:  #Acute blood loss anemia   #Acute LUE DVT on ultrasound   - Hemoglobin stable 8.2.  - Monitor Hgb and Plts, transfuse if Hgb < 7.   - High intensity dose heparin for DVT per surgery teams    Prophylaxis:    - Mechanical prophylaxis for DVT.   - Heparin SubQ TID.  - PPI    Lines/ tubes/ drains:  - LIJ MAC/swan, femoral arterial line, PIV, ETT, Basilio, Chest tubes x2    Disposition:  - CVICU.     Seen with Dr. Grant.    Kewsi Schwartz MD  Anesthesiology Resident CA2, PGY3      ====================================    TODAY'S PROGRESS:   SUBJECTIVE:   - Extubated overnight  - K/Mg/Ca replaced overnight  - Afib with RVR overnight  - 500mL LR bolus x2  - Metoprolol x2 IV for afib with RVR overnight    OBJECTIVE:   1. VITAL SIGNS:   Temp:  [99.1  F (37.3  C)-99.9  F (37.7  C)] 99.9  F (37.7  C)  Heart Rate:  [] 109  Resp:  [16-32] 20  BP: ()/(33-87) 94/73  MAP:  [67 mmHg-111 mmHg] 75 mmHg  Arterial Line BP: ()/(55-93) 98/62  FiO2 (%):  [40 %-50 %] 40 %  SpO2:  [92 %-100 %] 96 %  Ventilation Mode: CPAP/PS  (Continuous positive airway pressure with Pressure Support)  FiO2 (%): 40 %  Rate Set (breaths/minute): 16 breaths/min  Tidal Volume Set (mL): 380 mL  PEEP (cm H2O): 5 cmH2O  Pressure Support (cm H2O): 7 cmH2O  Oxygen Concentration (%): 40 %  Resp: 20    2. INTAKE/ OUTPUT:   I/O last 3 completed shifts:  In: 5345.06 [I.V.:2255.06; NG/GT:1170; IV Piggyback:1000]  Out: 6270 [Urine:3715; Emesis/NG  output:675; Stool:1200; Chest Tube:680]    3. PHYSICAL EXAMINATION:   General: Sedated, NAD  Neuro: Sedated, NAD, moves extremities, follows commands  Resp: Course bilaterally, equal and clears with cough effort  CV: sinus, normocardic per tele. CTs with s/s drainage.  Abdomen: Soft, mildly distended  Incisions: c/d/i  Extremities: warm, toes dusky bilaterally, cap refill < 2 seconds, +doppler signals bilateral PT. LUE with increased dusky color vs right side    4. INVESTIGATIONS:   Arterial Blood Gases     Recent Labs  Lab 03/07/18  0354 03/06/18  2208 03/06/18  1905 03/06/18  1822   PH 7.47* 7.45 7.43 7.43   PCO2 35 36 36 36   PO2 113* 159* 156* 142*   HCO3 26 25 24 24     Complete Blood Count     Recent Labs  Lab 03/07/18  0354 03/06/18  0402 03/05/18  1648 03/05/18  1132   WBC 17.7* 18.3* 20.2* 23.8*   HGB 8.4* 8.2* 8.7* 9.1*    141* 160 174     Basic Metabolic Panel    Recent Labs  Lab 03/07/18  0354 03/06/18  2319 03/06/18  1822 03/06/18  1430  03/06/18  0402  03/05/18  1648 03/05/18  1132   *  --   --   --   --  151*  --  150* 148*   POTASSIUM 3.8 4.3 4.1 3.8  < > 4.2  < > 3.9 4.4   CHLORIDE 112*  --   --   --   --  120*  --  117* 118*   CO2 25  --   --   --   --  20  --  23 18*   BUN 57*  --   --   --   --  70*  --  74* 70*   CR 1.40*  --   --   --   --  1.65*  --  1.85* 1.79*   *  --   --   --   --  138*  --  160* 243*   < > = values in this interval not displayed.  Liver Function Tests    Recent Labs  Lab 03/07/18  0354 03/06/18  0402 03/05/18  1648 03/05/18  1132 03/05/18  0842 03/05/18  0355  03/04/18 0353 03/03/18  0329  03/01/18 2017 03/01/18 0356   AST  --   --   --   --   --   --   --   --   --  34  --  38  --  58*   ALT 39 42  --   --  40  --   --  40  --  29  < > 65*  --  172*   ALKPHOS  --   --   --   --   --   --   --   --   --  27*  --  35*  --  37*   BILITOTAL  --   --   --   --   --   --   --   --   --  1.5*  --  1.2  --  0.7   ALBUMIN 2.0* 2.1*  --   --   --  2.1*  --   2.5*  --  2.4*  < > 1.7*  --  2.4*   INR  --   --  1.11 1.13 1.13 1.15*  < > 1.12  < > 1.36*  < > 1.86*  < > 1.51*   < > = values in this interval not displayed.  Pancreatic Enzymes  No lab results found in last 7 days.  Coagulation Profile    Recent Labs  Lab 03/05/18  1648 03/05/18  1132 03/05/18  0842 03/05/18  0355   INR 1.11 1.13 1.13 1.15*   PTT 24 27 25 26     Lactate  Invalid input(s): LACTATE    5. RADIOLOGY:   Recent Results (from the past 24 hour(s))   XR Chest Port 1 View    Narrative    Exam:  Chest radiograph, 3/7/2018 1:44 AM    History: Check endotracheal tube placement    Comparison:  30/6/2018    Findings:  Single AP view of the chest. Left-sided [place]-Supriya catheter  tip projects over the right main pulmonary artery. Enteric tube tip is  outside the field of view below the diaphragm. Stable bilateral chest  tubes. Interval extubation. Removal of nasogastric tube. The  cardiomediastinal silhouette is stable. Pulmonary vasculature is  indistinct. No pleural effusion or pneumothorax. Perihilar opacities.      Impression    Impression:    1. Interval extubation and removal of nasogastric tube. Remaining  support devices are stable.  2. Perihilar opacities, may represent pulmonary edema versus  infection.    I have personally reviewed the examination and initial interpretation  and I agree with the findings.    ANNE MANZANO MD       =========================================     Home

## 2024-03-13 NOTE — Clinical Note
"3/13/2024       RE: Kecia Blue  [address] [place] [address]     Dear Colleague,    Thank you for referring your patient, Kecia Blue, to the [facility] [place] at [facility]. Please see a copy of my visit note below.    Virtual Visit Details    Type of service:  Video Visit   Video Start Time: {video visit start/end time for provider to select:487728}  Video End Time:{video visit start/end time for provider to select:357621}    Originating Location (pt. Location): {video visit patient location:233249::\"Home\"}  {PROVIDER LOCATION On-site should be selected for visits conducted from your clinic location or adjoining [facility] hospital, academic office, or other nearby [facility] building. Off-site should be selected for all other provider locations, including home:565757}  Distant Location (provider location):  {virtual location provider:844408}  Platform used for Video Visit: {Virtual Visit Platforms:478040::\"[employer]\"}      Again, thank you for allowing me to participate in the care of your patient.      Sincerely,    Julia Espinoza MD    "

## 2024-03-13 NOTE — PROGRESS NOTES
Virtual Visit Details    Type of service:  Video Visit   Video Start Time: 4.03 pm   Video End Time:4.39 pm     Originating Location (pt. Location): Home    Distant Location (provider location):  Off-site  Platform used for Video Visit: [employer]    Total time including chart review, care-coordination and documentation time on the date of encounter - 45 mins    _______________________________________________________________________________________________________  [facility]  Transplant Infectious Disease Progress Note     Patient:  Kecia Blue, Date of birth 1960, Medical record number [id number]  Date of Visit:  03/13/2024         Assessment and Recommendations:   Recommendations:  -Trial of Amox for 2 months for the pulmon opacities and see if that helps improve oxygenation   -Stop valcyte when next weekly blood CMV is negative but continue to monitor weekly CMV PCR for 2 months after stopping valcyte to catch any recurrence. I would not restart valcyte unless CMV PCR >3k   - monitor EBV VL every 3 months   -- Due for second covid 2023/2024 vaccine    RTC 2 months     --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 This will most likely be for life   - periodic posaconazole trough levels - every 6 months   --Cont bactrim for PCP secondary prophylaxis.This will be for life       Problem List/Assessement:  -S/p lung transplant 3/1/2018 (CMV D+/R+, EBV D+/R+) on prednisone 5 mg daily, imuran 25 mg daily (stopped Nov 2023) and tacrolimus (level 6-12)   -Post transplant course has been complicated by right mainstem bronchial stenosis treated with serial dilations, laser, stent   -Severe PCP pneumonia/AHRF Jan 2021  -ESRD on HD  -Liver dysfunction - elevated alk phos, ascites and portal HTN prior to starting dialysis - 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 Will try amoxicillin for the actinomyces that grew in Oct BAL and see if that helps.     CMV viremia: to 2k in October in the setting of post obstructive pnuemonia. After treatment and stopping of valcyte, recurrence of CMV viremia to 1k in Jan 2024, currently on valcyte and neg.     EBV viremia: elevated to 960k in 10/2021. Suspect due to recent hospitalization, health stress. Decreased to 135k 2/2022. Neg 6/27/22.   Increased to 1 million in 11/2023 s/p 1 dose of rituximab and decreasing.     Left aspergillus empyema:  see history below. s/p drainage and vori (S to both vori and posa) in 2019, Calcified mass in left pleural cavity s/p biopsy Oct 2020 with aspergillus on cx and path (S to both vori and posa) vori changed to posaconazole. S/p left pleural effusion drainage April 2021 - exudative, fungal elements seen on KOH but fungal cx neg. Posa levels good and no recurrence of pleural effusion in Sep 2021 CT chest.     I suspect the left pleural effusion is reactive to the calcified aspergillus elements there along with fluid disturbance related to dialysis. I do not think this is failure of anti-fungal therapy or recurrence of Aspergillus infection. It seems Dr. Layton has had discussions with surgery for source control (removal of calcified mass), however, there is  concern for high morbidity with surgery and therefore it was decided to pursue long term posaconazole.       Candidacy for kidney transplant: This is a very difficult call. She is at risk for aspergillus infection worsening with heightened immune suppression soon after kidney transplant but its hard to know how much of a risk this will be. Also people with liver disease have worse fungal infection severity. Patient is ok with taking this risk. Ok to proceed with transplant from an ID standpoint.            -----------------------------------------------------------------------------------------------------------------------------------------------------  Interval events:  Last seen 11/15/23  Stopped valcyte end of nov as planned   Imuran stopped in Nov per patient   Developed Recurrent CMV to 1050 in late Jan/early Feb 2024  Was started on Valcyte three times a week and then last week switched to twice a week ( renal dosing). It has been undtecetable since 2/28/24    EBV VL went upto 1 million in 11/2023, she got one dose of rituxmab and decreased to 33k last check on 12/19/23     Latest Reference Range & Units 01/10/24 08:50 01/19/24 08:50 01/26/24 08:33 01/31/24 08:55 02/07/24 08:42 02/21/24 08:38 02/28/24 08:32   CMV DNA Quant (External) Undetected IU/mL <35 ! 120 ! 274 ! 836 ! 1,050 ! 53 ! Undetected   !: Data is abnormal    She reports being on oxygen for the past couple of months- 2 L   had Bronch 2/15 for the RMB stenosis and stent placement. BAL Cultures were negative   I Reviewed all the CT chests - From Oct 2023 lung opacities are present especially lower lobes R>L  Steno, aspergillus and actinomyces grew in the oct bronch   She has had a drop in her FEV1 and FVC since Oct 2023    On posaconazole prophylaxis for the prior aspergillus empyema   Wbc low - On Bactrim for PJP prophylaxis, imuran stopped in Nov 2023 per patient     Got COVID and RSV vaccine           Interval History:   I last saw her 8/2022  Low grade CMV viremia since 8/2023, not viremic anymore, plan for Valcyte for 2 more weeks and plan to stop   Recent admission Oct/Nov 2023 for post obstructive pneumonia which was treated with Augmentin, finished 11/8/23     Has Right bronch stenosis, gets balloon angioplasty, there is plan for laser   CT chest is planned for next week   Got flu shot, hoping to get covid and rsv     Continues to be on posaconazole 300 mg po daily, tolerating it well.   posa level 5/26 = 2 - most likely trough. Alk phos  is normal now   On Bactrim three times a week prophylaxis -  CBC ok    EBV neg 6/27/22    She feels well, no more breathing issues.            Current Medications & Allergies:   Reviewed    Allergies   Allergen Reactions     Isopropyl Alcohol Other (See Comments)     The alcohol burns the open areas on her skin when used as a skin prep for procedures     Vancomycin Other (See Comments)     Diffuse erythroderma with itching (improved with Benadryl) after receiving vancomycin over 1 hour. 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 Can use vancomycin in the future, but please give over slower infusion time (at least 2 hours) and premedicate with Benadryl.            Physical Exam:     Unable to examine due to virtual visit          Laboratory Data:     Inflammatory Markers    Recent Labs   Lab Test 10/29/23  0642 10/28/23  0725 10/07/19  1221 10/06/19  1444 09/13/19  0934 09/11/19  2325 08/22/19  0820   SED 66* 58* 93*  --  111* 102*  --    CRP  --   --   --  25.0* 58.0* 66.0* 47.0*       Immune Globulin Studies     Recent Labs   Lab Test 10/27/23  0701 10/24/23  1033 09/15/21  0915 01/31/21  0441 01/25/21  0406 10/27/19  0621 03/01/18  0356 02/19/18  0759     --  1,198 763  --  936 698 1,790*   IGM  --   --   --   --   --   --   --  502*   IGE  --  <2  --   --  <2  --   --  <2   IGA  --   --   --   --   --   --   --  425*   IGG1  --   --   --   --   --   --   --  1,300*   IGG2  --   --   --   --   --   --   --  131*   IGG3  --   --   --   --   --   --   --  101   IGG4  --   --   --   --   --   --   --  1*       Metabolic Studies       Recent Labs   Lab Test 03/05/24  0925 02/28/24  0832 12/27/23  0834 12/19/23  1138 11/29/23  0826 11/21/23  0752 11/08/23  0846 11/04/23  0653 10/31/23  0606 10/30/23  1706 10/28/23  1641 10/28/23  0914 10/28/23  0725 10/27/23  0701 10/26/23  0054 10/25/23  1356 06/27/22  1013 05/26/22  0750 10/22/19  1314  10/21/19  1752     --   --  139   < > 140  --  138   < > 136   < >  --    < > 133*   < > 133*   < > 137   < > 133   POTASSIUM 4.5  --   --  3.7   < > 3.6  --  3.8   < > 3.4   < >  --    < > 3.5   < > 4.4   < > 3.5   < > 5.0   CHLORIDE 101 103   < > 98   < > 98   < > 103   < > 99   < >  --    < > 95*   < > 96*   < > 96   < > 109   CO2 29  --   --  31*   < > 34*  --  25   < > 25   < >  --    < > 26   < > 26   < > 32   < > 10*   ANIONGAP 10  --   --  10   < > 8  --  10   < > 12   < >  --    < > 12   < > 11   < > 9   < > 13   BUN 25.3*  --   --  20.5   < > 18.2  --  14.1   < > 11.0   < >  --    < > 16.0   < > 24.6*   < > 42*   < > 66*   CR 4.09*  --   --  3.54*   < > 2.60*  --  2.86*   < > 1.94*   < >  --    < > 3.00*   < > 4.90*   < > 3.98*   < > 5.76*   GFRESTIMATED 12*  --   --  14*   < > 20*  --  18*   < > 29*   < >  --    < > 17*   < > 10*   < > 12*   < > 8*   GLC 93  --   --  114*   < > 108*  --  114*   < > 134*   < >  --    < > 100*   < > 92   < > 110*   < > 138*   A1C  --   --   --   --   --   --   --   --   --   --   --   --   --   --   --   --   --  5.2   < >  --    CHELSEY 8.1*  --   --  9.0   < > 9.2  --  8.8   < > 7.9*   < >  --    < > 8.9   < > 8.3*   < > 9.5   < > 8.0*   PHOS  --   --   --   --   --   --   --  3.0   < >  --    < >  --    < > 2.3*   < >  --   --   --    < > 6.7*   MAG 1.9  --    < > 1.8  --  1.8  --   --    < >  --   --   --   --   --    < >  --    < > 1.8   < > 2.0   LACT  --   --   --   --   --   --   --   --   --  0.9  --  0.7  --   --    < > 1.1   < >  --    < >  --    PCAL  --   --   --   --   --   --   --   --   --   --   --   --   --   --   --  0.70*  --   --    < >  --    FGTL  --   --   --   --   --  57  --   --   --   --   --   --   --  35  --   --   --   --    < >  --    CKT  --   --   --   --   --   --   --   --   --   --   --   --   --  69  --   --   --   --   --  70    < > = values in this interval not displayed.       Hepatic Studies    Recent Labs   Lab Test  11/21/23  0752 11/04/23  0653 11/03/23  0556 10/25/23  1356 10/24/23  1033 09/15/21  0915 05/01/21  0654 01/27/21  0414 01/26/21  0425   BILITOTAL 0.8 0.5 0.5   < > 0.9   < >  --    < > 0.8   DBIL  --   --   --   --  0.50*   < >  --    < > 0.6*   ALKPHOS 208* 179* 140*   < > 306*   < >  --    < > 184*   PROTTOTAL 7.3 6.3* 5.9*   < > 7.4   < > 7.2   < > 6.0*  6.0*   ALBUMIN 3.8 3.2* 3.1*   < > 3.6   < >  --    < > 2.0*   AST 27 22 14   < > 39   < >  --    < > 11   ALT 23 24 20   < > 31   < >  --    < > 30   LDH  --   --   --   --   --   --  164  --  187    < > = values in this interval not displayed.     Hematology Studies      Recent Labs   Lab Test 03/05/24  0925 02/14/24  1050 12/19/23  1138 12/13/23  0821 11/21/23  0752 11/04/23  0653   WBC 1.0* 3.0* 2.9*  2.9* 5.4 1.4* 11.9*   ANEU 0.2*  --   --   --   --  10.7*   ALYM 0.6*  --   --   --   --  0.6*   SHERRI 0.2  --   --   --   --  0.2   AEOS 0.0  --   --   --   --  0.1   HGB 10.5* 11.2* 10.4* 9.6* 8.2* 8.5*   HCT 33.3* 35.8 33.5* 29.9* 26.0* 26.8*   PLT 96* 142* 118* 103* 120* 256       Microbiology:  5/2021 pleural fluid KOH - pos fungal elements, fungal cx neg    10/2019 pleural fluid fungal cx   Aspergillus fumigatus    E-TEST   Amphotericin B 0.5 ug/mL No interpretation available    Comment: See comment... 1     Itraconazole 0.12 ug/mL No interpretation available    Micafungin <=0.06 ug/mL No interpretation available    Posaconazole <=0.06 ug/ml No interpretation available    Voriconazole 0.5 ug/mL No interpretation available      Lung tissue Fungal cx aug 2020    Aspergillus fumigatus     FUNGAL YEAST JAYME     Amphotericin B 1.0 ug/mL No interpretation available     Comment: See comment... 1      Itraconazole 0.25 ug/mL No interpretation available     Micafungin <=0.03 ug/mL No interpretation available     Posaconazole 0.12 ug/ml No interpretation available     Voriconazole 0.5 ug/mL No interpretation available      Imaging: personally reviewed   CT chest  11/21/23  1. Increasing mixed groundglass and interstitial opacities and focal  consolidation in the right lower lobe, and to a lesser extent the  right upper lobe and left lower lobe. These findings may represent  infection (viral, PJP), recurrence of interstitial lung disease,  restrictive allograft syndrome/CLAD.  2. Chronic appearing pulmonary embolism with calcification in the  right lower lobe pulmonary artery. PA enlargement suggestive of  pulmonary hypertension possibly CTEPH .  3. Upper left anterior chest wall and breast subcutaneous edema and  skin thickening. Nonspecific.  4. Stenosis of the bronchus intermedius.  5. Loculated anterior superior left pleural effusion. Chronic pleural  calcification left base secondary to prior empyema.  6. Gallbladder wall thickening and cholelithiasis may represent  chronic cholecystitis.     CXR 2/10/22  IMPRESSION:  1. Decreased retrocardiac opacities, likely representing  fibroatelectasis. Cannot exclude infectious component.  2. No new focal opacities, pleural effusion, or pneumothorax.  3. Mild bowel loop dilation in the limited upper abdomen.    CT chest 9/15/21  IMPRESSION: Small old partially calcified empyema paravertebral left  lower lung. Removal of previous thoracostomy tube. Unchanged  osteopenic T5 compression deformity. Bilateral lung transplantation.  Increased opacities in right lower lung which may indicate worsening  inflammation rather than edema. Continued right-sided bronchial  anastomotic narrowing, unchanged from just over 4 months ago.  Cholelithiasis. Aortic atherosclerosis.    CT chest 5/4/21  1. Decreased size of the peripherally calcified chronic empyema within  the medial left lower lobe with left sided chest tube in place.  Retained amphotericin bead is within the collection.  2. Cardiomegaly with basilar predominance groundglass opacities and  interlobular septal thickening, compatible with pulmonary edema.  3. Postsurgical changes of  bilateral lung transplant with unchanged  mild right bronchial anastomotic narrowing.    CT chest 4/8/21  1. Stable partially calcified pleural cystic structure/collection  along the medial right lower lobe, consistent with chronic  empyema/sequela of prior infection. Trace bilateral pleural effusions.  2. Cardiomegaly with basilar predominant patchy groundglass opacities  with diffuse interlobular septal thickening, consistent with pulmonary  edema.  3. Tree-in-bud nodular opacities predominantly involving the lingula,  concerning for infection.  4. Stable postsurgical changes of bilateral lung transplantation.  5. Cholelithiasis.  6. Splenomegaly.

## 2024-03-13 NOTE — NURSING NOTE
BP taken at dialysis today per pt, states it was fine      Is the patient currently in the state of [place]? YES    Visit mode:VIDEO    If the visit is dropped, the patient can be reconnected by: VIDEO VISIT: Text to cell phone:   Telephone Information:   Mobile [phone]       Will anyone else be joining the visit? NO  (If patient encounters technical issues they should call [phone])    How would you like to obtain your AVS? MyChart    Are changes needed to the allergy or medication list? Pt stated no changes to allergies and Pt stated no med changes    Patient declined individual allergy and medication review by support staff because they deny any changes and state that all information remains accurate since last reviewed/verified.     Reason for visit: GRABIEL Hatfield MA VVF

## 2024-03-14 NOTE — PROGRESS NOTES
WBC 2.1 and ANC 1.0 on 3/13/24. Per Ame, give one dose of Neupogen. Order sent to her local clinic. Message sent to patient.

## 2024-03-14 NOTE — LETTER
PHYSICIAN ORDERS      DATE & TIME ISSUED: 2024 1:06 PM  PATIENT NAME: Kecia Blue   : 1960     [facility] MR# [if applicable]: [id number]     DIAGNOSIS:  Lung Transplant  Z94.2    Patient needs a dose of 300 mcg Granix subcutaneous x1     Any questions please call: Mikayla        Ame Chow PA-C

## 2024-03-15 NOTE — RESULT ENCOUNTER NOTE
Armand Mcdonnell,   Your tacrolimus level was 8.7 at 12 hours on 3/13/24 which is within your goal range of 8-10. No dose change at this time. Please call the transplant office ([phone]) with any questions.   Thanks,   Mikayla

## 2024-03-20 NOTE — TELEPHONE ENCOUNTER
DATE:  3/20/2024     TIME OF RECEIPT FROM LAB:  9:45 am    ORDERING PROVIDER: Ame Chow    LAB TEST:  creatinine    LAB VALUE:  5.3    RESULTS GIVEN WITH READ-BACK TO (PROVIDER):  Marleny Brown RNCC covering for Mikayla Latham    TIME LAB VALUE REPORTED TO PROVIDER:   9:56 am

## 2024-03-20 NOTE — TELEPHONE ENCOUNTER
Critical lab noted of Creatinine 5.3 on 3/20/24. Patient is on Dialysis and this is expected for patient.
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
## 2024-03-27 NOTE — TELEPHONE ENCOUNTER
DATE:  3/27/2024     TIME OF RECEIPT FROM LAB:  9:33 am    ORDERING PROVIDER: Ame Chow PA-C    LAB TEST:  WBC    LAB VALUE:  1.8    RESULTS GIVEN WITH READ-BACK TO (PROVIDER):  Dyana Lee RNCC covering for Mikayla Latham RN    TIME LAB VALUE REPORTED TO PROVIDER:   9:43 am

## 2024-03-29 NOTE — TELEPHONE ENCOUNTER
WBC 1.7, ANC 1 on 3/29/24. Reviewed with Dr. Orona and plan to give Neupogen daily x3. Message sent to patient, she is hesitant to do daily shots x3 as they really wipe her out the next day. She is agreeable to at least do one injection. Message sent to Dr. Espinoza regarding Valcyte.

## 2024-03-29 NOTE — LETTER
PHYSICIAN ORDERS      DATE & TIME ISSUED: 2024 3:27 PM  PATIENT NAME: Kecia Blue   : 1960     [facility] MR# [if applicable]: [id number]     DIAGNOSIS:  Lung Transplant  Z94.2  Patient needs a dose of 300 mcg Granix subcutaneous daily x3 days.       Any questions please call: Mikayla     Please fax these results to [phone].        Ame Chow PA-C

## 2024-04-09 NOTE — TELEPHONE ENCOUNTER
Tacrolimus level 4.3 at 12 hours, on 4/3/24.  Goal 8-10.   Current dose 0.5 mg in AM, 0.5 mg in PM    Dose changed to 0.7 mg in AM, 0.7 mg in PM   Recheck level in 7 days.     Nurse to discuss dose change with patient.

## 2024-04-18 NOTE — RESULT ENCOUNTER NOTE
Armand Mcdonnell,   Your tacrolimus level was 10.4 at 12 hours on 4/17/24 which is within your goal range of 8-10. No dose change at this time. Please call the transplant office ([phone]) with any questions.   Thanks,   Mikayla

## 2024-05-06 NOTE — PROGRESS NOTES
New IP (Interventional Pulmonology) referral rec'd.  Chart reviewed.       New Patient: Interventional Pulmonary (Lung nodule) Nurse Navigator Note    Referring provider: Ame Chow PA-[facility]    Referred to (specialty): Interventional Pulmonary (Lung nodule)    Requested provider (if applicable): n/a    Date Referral Received: 5/6/2024    Evaluation for :  discuss need for on-going bronchs    Clinical History (per Nurse review of records provided):    **BOOK MARKED**    2/14/2024:  PFT's      CT chest high resolution without contrast     INDICATION: Lung transplant. Inflammatory myositis.     COMPARISON: Chest CT 11/21/2023     FINDINGS: No contrast. Inspiration and expiration supine imaging  obtained. Thyroid appears unremarkable. Median sternotomy.  Cardiomegaly. Pulmonary artery enlargement up to 3.3 cm. Aorta upper  normal size. Atherosclerotic calcification in the thoracic aorta.  Mitral annular calcifications. Calcified right hilar lymph node.  Nonenlarged multistation mediastinal lymph nodes. No axillary enlarged  lymph nodes. Scattered nonenlarged lymph nodes in the left side of the  lower neck just lateral to the lower pole of the left thyroid lobe.  Breast edema on the left with skin thickening. This could represent  inflammatory process or overall edema. Please correlate with  appropriate breast imaging an/or clinical clearing. Trace bilateral  pleural effusions. Rim calcified density in the left paravertebral  lower hemithorax unchanged.  Pulmonary artery calcifications which may indicate chronic emboli.  Calcifications/hyperdensities along the wall of the SVC there also  indicate chronic thrombosis.  Gallstones in the upper abdomen. No suspicious upper abdominal mass.     Bone detail shows osteopenia. Unchanged T5 compression fracture.  Median sternotomy. Mild degenerative changes throughout the mid  thoracic spine.     Detail of the  lungs shows consolidative another patchy opacities  bilaterally with the most dense area of consolidation in the right  lower lobe in medial left apex. Comparison with previous shows the  medial left apical consolidation unchanged. Groundglass opacity  prominent in the right middle lobe previously has essentially  resolved. Right lower lobe consolidative opacities have minimally  decreased and left lower lobe patchy opacities appears similar to  slightly improved. Left upper lobe ground glass and other patchy  densities otherwise appear not significantly changed. Continued right  bronchus intermedius mild narrowing.  Expiratory imaging shows mild air trapping. No pneumothorax. No  dominant cystic lung disease pattern.                                                                      IMPRESSION:  1. Overall opacities in the lungs showing minimally improved right  lower lobe consolidative opacities an resolution of previous right  middle lobe ground glass density otherwise unchanged consolidative and  patchy opacities in the lungs. Is likely inflammatory/infectious which  overall has minimally improved. Unchanged mild narrowing of right  bronchus intermedius.  2. Left breast skin thickening and breast edema. Not significantly  changed from 3 months ago. Correlate with breast imaging as  appropriate.  3. Possible sequela of previous chronic pulmonary emboli.  4. Borderline pulmonary artery enlargement may indicate possible  pulmonary hypertension..  5. Cholelithiasis.  6. Trace bilateral pleural effusions grossly similar to previous.     TERRI ISSA MD     Records Location: Epic & CE ([employer])    RECORDS NEEDED:  11/30/2023 PET imaging pushed to PACS from [employer]--thank you!!       Additional testing needed prior to consult: none

## 2024-05-06 NOTE — TELEPHONE ENCOUNTER
RECORDS STATUS - ALL OTHER DIAGNOSIS      Action May 6, 2024 3:54 PM    Action Taken - Called [facility] to push img.      Imaging Received  May 9, 2024 10:57 AM    Action: Images from [facility] received and resolved to PACS.     RECORDS NEEDED: 11/30/2023 PET imaging pushed to PACS from [employer]    RECORDS RECEIVED FROM:    NOTES STATUS DETAILS   OFFICE NOTE from referring provider Epic 2/14/24: Ame Chow PA-C   OPERATIVE REPORT [facility] PFT: 2/14/24 8/13/20: Lung Bx  4/10/18: TRANSBRONCHIAL BIOPSY  2/1/18: PNEUMONECTOMY    MEDICATION LIST     LABS     PATHOLOGY REPORTS Report in [facility] Cytology:  11/1/23: [id number]  8/17/23: [id number]  2/11/22: [id number]    8/13/20: [id number]   4/10/18: [id number]   3/1/18: [id number]    ANYTHING RELATED TO DIAGNOSIS CE- [facility] Most recent 5/1/24   IMAGING (NEED IMAGES & REPORT)     CT SCANS PACS 2/14/24-2/22/18: CT Chest  10/27/23: CT Chest Pulm Embolism   MRI PACS 10/31/23: MR Chest   PET (Img req from [facility]) 11/30/23: PET Eyes to Thighs

## 2024-05-06 NOTE — TELEPHONE ENCOUNTER
Tacrolimus level 10.9 at 12 hours, on 5/1/24.  Goal 8-10.   Current dose 0.7 mg in AM, 0.7 mg in PM    Dose changed to 0.7 mg in AM, 0.6 mg in PM   Recheck level week of 5/13/24    Discussed with patient. Plan for CMV weekly, other labs only need to be q2-3 weeks as they have been improving since stopping Valcyte. Pt will check if her dialysis can draw the weekly CMV. Pt wonders if it's worth doing another bronch as they don't seem to help her symptoms and she develops a resp infection afterward. Will see if IP can do a video visit with her to discuss prior to scheduling.

## 2024-05-12 NOTE — MR AVS SNAPSHOT
Kecia Blue   8/20/2018   Anticoagulation Therapy Visit    Description:  57 year old female   Provider:  Ofe Redmond, RN   [facility]           INR as of 8/20/2018     Today's INR 1.03!      Anticoagulation Summary as of 8/20/2018     INR goal 2.0-3.0   Today's INR 1.03!   Full warfarin instructions 8/20: 6 mg; 8/21: 6 mg; 8/22: 6 mg; Otherwise No maintenance plan   Next INR check 8/23/2018    Indications   Lung transplant recipient (H) [Z94.2]  Deep vein thrombosis (DVT) (H) [I82.409] [I82.409]         August 2018 Details    Sun Mon Tue Wed Thu Fri Sat        1               2               3               4                 5               6               7               8               9               10               11                 12               13               14               15               16               17               18                 19               20      6 mg   See details      21      6 mg         22      6 mg         23            24               25                 26               27               28               29               30               31                 Date Details   08/20 This INR check       Date of next INR:  8/23/2018         How to take your warfarin dose     To take:  6 mg Take 6 of the 1 mg tablets.            Not Applicable

## 2024-05-13 NOTE — PROGRESS NOTES
Virtual Visit Details    Type of service:  Video Visit   Video Start Time: 9:21 AM  Video End Time:9:36 AM    Originating Location (pt. Location): Home  Distant Location (provider location):  On-site  Platform used for Video Visit: [facility]    LUNG NODULE & INTERVENTIONAL PULMONARY CLINIC  [facility]     Kceia Blue MRN# [id number]   Age: 63 year old YOB: 1960     Reason for Consultation: Bronchial stenosis    Requesting Physician: Ame Chow PA-C  [address] [place] [address]    Assessment and Plan:    1. Right anastomosis stenosis  Last bronchoscopy done 2/15/24 with mild narrowing of right mainstem anastomosis and BI. The BI was lasered and balloon dilated. She had undergone dilation 11/2023 and 10/2023. Last had stent removed 11/2018 due to migration (12 x 30 Aero). This stent only spanned the anastomosis and did not cover the RUL, so may have been too long. There were also issues with granulation tissue that required debulking in 2018 that has not been the case in more recent bronchoscopies.    PFTs done 11/21/23, 4 days after her last dilation did not show a change in her PFTs (slight decrease)  PFTs done 10/24/23, 7 days after 10/17/23 dilation did not show change in PFTs    She initially had good symptomatic response with dilation alone after stent removal, but the most recent bronchoscopies breathing is only improved for several days and there is no change in PFTs afterwards. Discussed the option of placing a stent with the next bronchoscopy to see if this is helpful as the narrowing is likely coming back soon after balloon dilation and she is agreeable.     --Plan for bronchoscopy with stent placement over right anastomosis if there is narrowing, even if it improves with dilation given how quickly her symptoms come back. May need to have a bifurcating RUL stent as previous attempts with 12 x 15mm Aero stent was too short and 12 x 30mm likely  too long (touched the gee) when only covering the anastomosis.      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 Has been on oxygen for a few months since her last bronchoscopy.     She felt that initially her breathing was improved for several months after dilations but the most recent several bronchoscopies, she feel she can take a deeper breath for a few days after a bronchoscopy and then the benefits fade.         Allergies:      Allergies   Allergen Reactions    Isopropyl Alcohol Other (See Comments)     The alcohol burns the open areas on her skin when used as a skin prep for procedures    Vancomycin Other (See Comments)     Diffuse erythroderma with itching (improved with Benadryl) after receiving vancomycin over 1 hour. 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 Can use vancomycin in the future, but please give over slower infusion time (at least 2 hours) and premedicate with Benadryl.          Medications:     Current Outpatient Medications   Medication Sig Dispense Refill    acetaminophen (TYLENOL) 325 MG tablet Take 1 tablet (325 mg) by mouth every 4 hours as needed for mild pain or fever 60 tablet     acetylcysteine (MUCOMYST) 10 % nebulizer solution Inhale 4 mLs into the lungs 3 times daily as needed for mucolysis/respiratory distress      albuterol (PROVENTIL) (2.5  MG/3ML) 0.083% neb solution Take 1 vial (2.5 mg) by nebulization every 12 hours      amoxicillin (AMOXIL) 875 MG tablet Take 1 tablet (875 mg) by mouth 2 times daily 60 tablet 1    azithromycin (ZITHROMAX) 250 MG tablet Take 1 tablet (250 mg) by mouth daily      benzonatate (TESSALON) 100 MG capsule Take 1 capsule (100 mg) by mouth every 4 hours as needed for cough 30 capsule 0    cholecalciferol 50 MCG (2000 UT) CAPS Take 1 capsule by mouth daily On dialysis days (MWF)      fluticasone-salmeterol (ADVAIR) 250-50 MCG/ACT inhaler Inhale 1 puff into the lungs every 12 hours 60 each 11    guaiFENesin (MUCINEX) 600 MG 12 hr tablet Take 2 tablets (1,200 mg) by mouth 2 times daily 120 tablet 3    Magnesium Cl-Calcium Carbonate (SLOW-MAG) 71.5-119 MG TBEC Take 2 tablets by mouth four times a week Take on non dialysis days T/Th/Sa/Su 90 tablet 3    metoprolol tartrate (LOPRESSOR) 25 MG tablet Take 1 tablet (25 mg) by mouth 2 times daily      montelukast (SINGULAIR) 10 MG tablet Take 1 tablet (10 mg) by mouth At Bedtime 30 tablet 11    pantoprazole (PROTONIX) 40 MG EC tablet Take 1 tablet (40 mg) by mouth every morning (before breakfast) 30 tablet 11    posaconazole (NOXAFIL) 100 MG EC tablet Take 3 tablets (300 mg) by mouth daily 90 tablet 11    predniSONE (DELTASONE) 5 MG tablet Take 1 tablet (5 mg) by mouth daily 30 tablet 11    sulfamethoxazole-trimethoprim (BACTRIM) 400-80 MG tablet Take 1 tablet by mouth three times a week (Patient taking differently: Take 1 tablet by mouth three times a week MWF) 13 tablet 11    tacrolimus (GENERIC) 1 mg/mL suspension Take 0.7 mLs (0.7 mg) by mouth every morning AND 0.6 mLs (0.6 mg) every evening. 39 mL 11     No current facility-administered medications for this visit.            Physical Exam:     EYES: Eyes grossly normal to inspection.    NEURO: Cranial nerves grossly intact.  Mentation and speech appropriate for age.  GENERAL: Healthy, alert and no distress  RESP: No audible  wheeze, cough, wearing oxygen  PSYCH: Mentation appears normal, affect normal judgement and insight intact, normal speech and appearance well-groomed.     Imaging/Lab Data   All laboratory and imaging data reviewed.

## 2024-05-16 NOTE — LETTER
5/16/2024       RE: Kecia Blue  [address] [place] [address]     Dear Colleague,    Thank you for referring your patient, Kecia Blue, to the [facility] at [facility]. Please see a copy of my visit note below.    Virtual Visit Details    Type of service:  Video Visit   Video Start Time: 9:21 AM  Video End Time:9:36 AM    Originating Location (pt. Location): Home  Distant Location (provider location):  On-site  Platform used for Video Visit: [facility]    LUNG NODULE & INTERVENTIONAL PULMONARY CLINIC  [facility]     Kecia Blue MRN# [id number]   Age: 63 year old YOB: 1960     Reason for Consultation: Bronchial stenosis    Requesting Physician: Ame Chow PA-C  [address] [place] [address]    Assessment and Plan:    1. Right anastomosis stenosis  Last bronchoscopy done 2/15/24 with mild narrowing of right mainstem anastomosis and BI. The BI was lasered and balloon dilated. She had undergone dilation 11/2023 and 10/2023. Last had stent removed 11/2018 due to migration (12 x 30 Aero). This stent only spanned the anastomosis and did not cover the RUL, so may have been too long. There were also issues with granulation tissue that required debulking in 2018 that has not been the case in more recent bronchoscopies.    PFTs done 11/21/23, 4 days after her last dilation did not show a change in her PFTs (slight decrease)  PFTs done 10/24/23, 7 days after 10/17/23 dilation did not show change in PFTs    She initially had good symptomatic response with dilation alone after stent removal, but the most recent bronchoscopies breathing is only improved for several days and there is no change in PFTs afterwards. Discussed the option of placing a stent with the next bronchoscopy to see if this is helpful as the narrowing is likely coming back soon after balloon  dilation and she is agreeable.     --Plan for bronchoscopy with stent placement over right anastomosis if there is narrowing, even if it improves with dilation given how quickly her symptoms come back. May need to have a bifurcating RUL stent as previous attempts with 12 x 15mm Aero stent was too short and 12 x 30mm likely too long (touched the gee) when only covering the anastomosis.      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 Has been on oxygen for a few months since her last bronchoscopy.     She felt that initially her breathing was improved for several months after dilations but the most recent several bronchoscopies, she feel she can take a deeper breath for a few days after a bronchoscopy and then the benefits fade.         Allergies:      Allergies   Allergen Reactions     Isopropyl Alcohol Other (See Comments)     The alcohol burns the open areas on her skin when used as a skin prep for procedures     Vancomycin Other (See Comments)     Diffuse erythroderma with itching (improved with Benadryl) after receiving vancomycin over 1 hour. 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 Can use vancomycin in the future, but please give over slower infusion time (at least 2 hours) and premedicate with Benadryl.          Medications:     Current Outpatient Medications    Medication Sig Dispense Refill     acetaminophen (TYLENOL) 325 MG tablet Take 1 tablet (325 mg) by mouth every 4 hours as needed for mild pain or fever 60 tablet      acetylcysteine (MUCOMYST) 10 % nebulizer solution Inhale 4 mLs into the lungs 3 times daily as needed for mucolysis/respiratory distress       albuterol (PROVENTIL) (2.5 MG/3ML) 0.083% neb solution Take 1 vial (2.5 mg) by nebulization every 12 hours       amoxicillin (AMOXIL) 875 MG tablet Take 1 tablet (875 mg) by mouth 2 times daily 60 tablet 1     azithromycin (ZITHROMAX) 250 MG tablet Take 1 tablet (250 mg) by mouth daily       benzonatate (TESSALON) 100 MG capsule Take 1 capsule (100 mg) by mouth every 4 hours as needed for cough 30 capsule 0     cholecalciferol 50 MCG (2000 UT) CAPS Take 1 capsule by mouth daily On dialysis days (MWF)       fluticasone-salmeterol (ADVAIR) 250-50 MCG/ACT inhaler Inhale 1 puff into the lungs every 12 hours 60 each 11     guaiFENesin (MUCINEX) 600 MG 12 hr tablet Take 2 tablets (1,200 mg) by mouth 2 times daily 120 tablet 3     Magnesium Cl-Calcium Carbonate (SLOW-MAG) 71.5-119 MG TBEC Take 2 tablets by mouth four times a week Take on non dialysis days T/Th/Sa/Su 90 tablet 3     metoprolol tartrate (LOPRESSOR) 25 MG tablet Take 1 tablet (25 mg) by mouth 2 times daily       montelukast (SINGULAIR) 10 MG tablet Take 1 tablet (10 mg) by mouth At Bedtime 30 tablet 11     pantoprazole (PROTONIX) 40 MG EC tablet Take 1 tablet (40 mg) by mouth every morning (before breakfast) 30 tablet 11     posaconazole (NOXAFIL) 100 MG EC tablet Take 3 tablets (300 mg) by mouth daily 90 tablet 11     predniSONE (DELTASONE) 5 MG tablet Take 1 tablet (5 mg) by mouth daily 30 tablet 11     sulfamethoxazole-trimethoprim (BACTRIM) 400-80 MG tablet Take 1 tablet by mouth three times a week (Patient taking differently: Take 1 tablet by mouth three times a week MWF) 13 tablet 11     tacrolimus (GENERIC) 1 mg/mL suspension Take 0.7 mLs (0.7  mg) by mouth every morning AND 0.6 mLs (0.6 mg) every evening. 39 mL 11     No current facility-administered medications for this visit.            Physical Exam:     EYES: Eyes grossly normal to inspection.    NEURO: Cranial nerves grossly intact.  Mentation and speech appropriate for age.  GENERAL: Healthy, alert and no distress  RESP: No audible wheeze, cough, wearing oxygen  PSYCH: Mentation appears normal, affect normal judgement and insight intact, normal speech and appearance well-groomed.     Imaging/Lab Data   All laboratory and imaging data reviewed.         Again, thank you for allowing me to participate in the care of your patient.      Sincerely,    Juana Baugh MD

## 2024-05-16 NOTE — NURSING NOTE
Is the patient currently in the state of [place]? YES    Visit mode:VIDEO    If the visit is dropped, the patient can be reconnected by: VIDEO VISIT: Send to e-mail at: [email or web address]    Will anyone else be joining the visit? NO  (If patient encounters technical issues they should call [phone])    How would you like to obtain your AVS? MyChart    Are changes needed to the allergy or medication list? Pt stated no changes to allergies and Pt stated no med changes    Are refills needed on medications prescribed by this physician? NO    Reason for visit: Consult    Juana Perez LPN

## 2024-05-17 NOTE — TELEPHONE ENCOUNTER
Tacrolimus level 7.3 at 12 hours, on 5/14.  Goal 8-10.   Current dose 0.7 mg in AM, 0.6 mg in PM    Dose changed to 0.7 mg in AM, 0.7 mg in PM   Recheck level in 7-10    Discussed with patient   MyChart message sent     Patient scheduled for IP bronch on 6/28.   Orders placed for pre-op appointment with pulmonary transplant.

## 2024-05-24 NOTE — TELEPHONE ENCOUNTER
Contacted patient to schedule repeat bronchoscopy.     Patient agreeable to June 28th date/time offered.     Preoperative instructions sent via: [employer].     Preop via transplant. No anticoagulation.     All questions answered. Message to procedure  to finalize.

## 2024-05-25 NOTE — RESULT ENCOUNTER NOTE
Armand Mcdonnell, your tacrolimus level was 9.1 at 12 hours on 6/25/19 which is within your goal range of 8-10. No dose change at this time. Please call the transplant office ([phone]) with any questions. Thanks, Mikayla no

## 2024-06-03 NOTE — RESULT ENCOUNTER NOTE
Armand Mcdonnell,   Your tacrolimus level is now a little high. Since the level was too low on the lower dose, let's keep your dose the same for now and repeat in a few weeks. Thanks!    Mikayla

## 2024-06-13 NOTE — TELEPHONE ENCOUNTER
Patient calls reporting over the last week her breathing has been getting worse with more shortness of breath and tightness.  She has noticed in the last couple days that when talking she is becoming short of breath quicker after few sentences.  Still requiring 3 L of oxygen via concentrator and 4 L pulsed which is unchanged.  She is worried that she may not be able to make it until her scheduled bronchoscopy at the end of the month.  Writer sent a message to the interventional pulmonary team to inquire if her bronchoscopy can be moved up sooner.  Writer advised that she should seek medical attention in the ER if anything worsens further with her breathing and she is agreeable to this plan.

## 2024-06-14 NOTE — TELEPHONE ENCOUNTER
OR bronch was moved up to be on 6/26.  Spoke with patient and she is able to come down on Tuesday 6/25 for her preop appointment with us.  Message sent to schedulers to have this reviewed.  Reviewed again that patient should come to the ER if her symptoms worsen in the meantime.  Notified her that interventional pulmonary will be looking out for any cancellations as well if they can move her up any further.  
Dr. Hammond
HUI Hammond

## 2024-06-17 NOTE — LETTER
PHYSICIAN ORDERS      DATE & TIME ISSUED: 2024 9:07 AM  PATIENT NAME: Kecia Blue   : 1960     [facility] MR# [if applicable]: [id number]     DIAGNOSIS:  Lung Transplant  Z94.2  Please check the following labs:  TSH with free T4 reflex  CMP  CBC with differential        Any questions please call: Mikayla at [phone]    Please fax these results to [phone].        Ame Chow PA-C

## 2024-06-17 NOTE — LETTER
PHYSICIAN ORDERS      DATE & TIME ISSUED: 2024 9:01 AM  PATIENT NAME: Kecia Blue   : 1960     [facility] MR# [if applicable]: [id number]     DIAGNOSIS:  Lung Transplant  Z94.2    Patient needs a nose swab for a COVID PCR and respiratory viral panel.   Please check sputum for the following: Respiratory aerobic bacterial culture with Gram stain.  Fungal culture.      Any questions please call: Nasreen at [phone]    Please fax these results to [phone].        Ame Chow PA-C

## 2024-06-17 NOTE — LETTER
PHYSICIAN ORDERS      DATE & TIME ISSUED: 2024 9:38 AM  PATIENT NAME: Kecia lBue   : 1960     [facility] MR# [if applicable]: [id number]     DIAGNOSIS:  Lung Transplant  Z94.2    Please draw the following labs:  CMP  Magnesium  CBC with differential  TSH with free T4 reflex  CMV DNA quantitative PCR  EBV PCR plasma   Tacrolimus trough level (only draw if a 12-hour trough level)     Any questions please call: Nasreen at [phone]    Please fax these results to [phone].      .

## 2024-06-17 NOTE — TELEPHONE ENCOUNTER
Patient calls with the following complaints:    Patient feeling more fatigued than normal.  Has been sleeping on and off throughout the day for the past 2 weeks.  Patient using more oxygen.  Using 4 L/min as opposed to 3 L/min.  Oxygen sats mid 90s.  Patient reports blood pressure and heart rate have been within normal range.  Patient reports sinus congestion that is impeding her hearing.  Recommended that patient use a Samanta pot and Flonase for sinus congestion.    Patient on her way to dialysis.  Will obtain labs, RVP, COVID, and sputum sample if able.  Of note: Patient does not know if she will be able to cough up sputum.

## 2024-06-18 PROBLEM — J98.09 BRONCHIAL STENOSIS, RIGHT: Status: ACTIVE | Noted: 2024-01-01

## 2024-06-18 PROBLEM — J96.22 ACUTE ON CHRONIC RESPIRATORY FAILURE WITH HYPERCAPNIA (H): Status: ACTIVE | Noted: 2024-01-01

## 2024-06-18 NOTE — CONSULTS
Interventional Pulmonology          Initial Inpatient Consultation Note                                     June 18, 2024            Patient: Kecia Blue    Date of Admission: 6/18/2024  Reason for Consultation: Respiratory failure, history of lung transplant, bronchus intermedius stenosis  Requesting Physician: No referring provider defined for this encounter.      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  Interventional Pulmonology is consulted today for possible immediate bronchoscopic interventions.    Assessment:  Acute on chronic respiratory failure hypoxic and hypercapnic secondary to multilobar pneumonia  Status post bilateral sequential lung transplantation with bronchus intermedius stenosis  Bronchus intermedius stenosis  Multilobar pneumonia including right upper lobe, right lower lobe and right middle lobe  Right middle lobe collapse which could be result of infectious process versus vanishing syndrome      Plan:  Continue noninvasive ventilation with BiPAP alternating with supplemental oxygen  Patient has hypercapnic respiratory failure which is most likely from multi lobar pneumonia  We reviewed the CT scan of the chest which showed bronchus intermedius narrowing but it looks open  Right middle lobe collapse which could be combination of mucous plugging versus vanishing right middle lobe airway (flexible bronchoscopy done in February 15, 2024 showed open right middle lobe bronchus)  Patient needs to be treated for multilobar pneumonia with  "broad-spectrum antibiotics  Patient originally scheduled to get rigid/flexible bronchoscopy and possible bronchus intermedius stenting on June 26, 2024  Patient does not need urgent bronchoscopy at this point and needs to get broad-spectrum antibiotics for treatment of multilobar pneumonia  Interventional pulmonary service will follow patient along with lung transplant team      Patient seen and discussed with Dr. 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  Interventional Pulmonology is consulted today for possible immediate bronchoscopic interventions.  Patient was found to have hypoxic and hypercapnic respiratory failure with pCO2 of 85.  Patient was placed on BiPAP and after that pCO2 improved.  During evaluation patient was on BiPAP but per  she is more awake than before.  Per  who provided most of the history patient respiratory status got worse last 10 days.  No reported fevers.  No sick contacts.  Patient also did not have productive cough.           Data:   All pertinent laboratory and imaging data reviewed.           Past Medical History:     Past Medical History:   Diagnosis Date    Acute on chronic respiratory failure with hypoxia (H) 02/21/2018    Anisocoria     Antisynthetase syndrome (H24) 2014    Anxiety     Aspergillus (H)     Aspergillus pneumonia (H) 11/20/2020    Benign essential hypertension     " C. difficile colitis     Cytomegalovirus (CMV) viremia (H)     Dermatomyositis (H)     Dysplasia of cervix, low grade (ESTRADA 1)     EBV (Franklin-Barr virus) viremia     ESRD (end stage renal disease) on dialysis (H)     ILD (interstitial lung disease) (H)     Lung replaced by transplant (H)     Osteopenia     Paroxysmal atrial fibrillation (H)     Pneumocystis jiroveci pneumonia (H)     PONV (postoperative nausea and vomiting)     Post-menopause     Pulmonary hypertension (H)     Raynaud's disease     Seronegative rheumatoid arthritis (H)             Past Surgical History:     Past Surgical History:   Procedure Laterality Date    BRONCHOSCOPY (RIGID OR FLEXIBLE), DIAGNOSTIC N/A 4/10/2018    Procedure: COMBINED BRONCHOSCOPY (RIGID OR FLEXIBLE), LAVAGE;;  Surgeon: Mariposa Donohue MD;  [facility]    BRONCHOSCOPY (RIGID OR FLEXIBLE), DIAGNOSTIC N/A 12/23/2020    Procedure: BRONCHOSCOPY, WITH BRONCHOALVEOLAR LAVAGE;  Surgeon: Uri Bass MD;  [facility]    BRONCHOSCOPY (RIGID OR FLEXIBLE), DIAGNOSTIC N/A 5/26/2022    Procedure: BRONCHOSCOPY, WITH BRONCHOALVEOLAR LAVAGE;  Surgeon: Uri Bass MD;  [facility]    BRONCHOSCOPY (RIGID OR FLEXIBLE), DIAGNOSTIC N/A 8/17/2023    Procedure: BRONCHOSCOPY, WITH BRONCHOALVEOLAR LAVAGE;  Surgeon: Esau Bruno MD;  [facility]    BRONCHOSCOPY (RIGID OR FLEXIBLE), DIAGNOSTIC N/A 11/1/2023    Procedure: BRONCHOSCOPY, WITH BRONCHOALVEOLAR LAVAGE;  Surgeon: Beto Magaña DO;  [facility]    BRONCHOSCOPY (RIGID OR FLEXIBLE), DILATE BRONCHUS / TRACHEA N/A 10/11/2018    Procedure: BRONCHOSCOPY (RIGID OR FLEXIBLE), DILATE BRONCHUS / TRACHEA;  Flexible And Rigid Bronchoscopy and Dilation;  Surgeon: Wilber Lin MD;  [facility]    BRONCHOSCOPY (RIGID OR FLEXIBLE), DILATE BRONCHUS / TRACHEA N/A 11/1/2023    Procedure: Bronchoscopy (Rigid Or Flexible), Dilate Bronchus / Trachea;  Surgeon: Beto Magaña DO;  [facility]     BRONCHOSCOPY FLEXIBLE N/A 3/13/2018    Procedure: BRONCHOSCOPY FLEXIBLE;  Flexible Bronchoscopy ;  Surgeon: Gissell Sanchez MD;  [facility]    BRONCHOSCOPY FLEXIBLE N/A 5/9/2018    Procedure: BRONCHOSCOPY FLEXIBLE;;  Surgeon: Wilber Lin MD;  [facility]    BRONCHOSCOPY FLEXIBLE AND RIGID N/A 9/10/2018    Procedure: BRONCHOSCOPY FLEXIBLE AND RIGID;  Flexible and Rigid Bronchoscopy with Balloon Dilation, tissue debulking with cryo, and Right mainstem bronchus stent placement;  Surgeon: Wilber Lin MD;  [facility]    BRONCHOSCOPY RIGID N/A 6/6/2018    Procedure: BRONCHOSCOPY RIGID;;  Surgeon: Lopez Macias MD;  [facility]    BRONCHOSCOPY, DILATE BRONCHUS, STENT BRONCHUS, COMBINED N/A 6/11/2018    Procedure: COMBINED BRONCHOSCOPY, DILATE BRONCHUS, STENT BRONCHUS;  Flexible Bronchoscopy, Balloon Dilation, Bronchial Washings;  Surgeon: Wilber Lin MD;  [facility]    BRONCHOSCOPY, DILATE BRONCHUS, STENT BRONCHUS, COMBINED Right 7/10/2018    Procedure: COMBINED BRONCHOSCOPY, DILATE BRONCHUS, STENT BRONCHUS;  Flexible Bronchoscopy, Balloon Dilation, Bronchial Washings  ;  Surgeon: Wilber Lin MD;  [facility]    BRONCHOSCOPY, DILATE BRONCHUS, STENT BRONCHUS, COMBINED N/A 8/2/2018    Procedure: COMBINED BRONCHOSCOPY, DILATE BRONCHUS, STENT BRONCHUS;  Flexible Bronchoscopy, Bronchial Washings, Balloon Dilation;  Surgeon: Wilber Lin MD;  [facility]    BRONCHOSCOPY, DILATE BRONCHUS, STENT BRONCHUS, COMBINED N/A 8/20/2018    Procedure: COMBINED BRONCHOSCOPY, DILATE BRONCHUS, STENT BRONCHUS;  Flexible Bronchoscopy, Balloon Dilation;  Surgeon: Wilber Lin MD;  [facility]    BRONCHOSCOPY, DILATE BRONCHUS, STENT BRONCHUS, COMBINED N/A 10/29/2018    Procedure: Flexible Bronchoscopy, Balloon Dilation, Stent Revision, Airway Examination And Therapeutic Suctioning, Cyro Tumor Debulking;  Surgeon: Wilber Lin,  MD;  [facility]    BRONCHOSCOPY, DILATE BRONCHUS, STENT BRONCHUS, COMBINED N/A 11/20/2018    Procedure: Rigid Bronchoscopy, Stent Removal and dilitation;  Surgeon: Wilber Lin MD;  [facility]    BRONCHOSCOPY, DILATE BRONCHUS, STENT BRONCHUS, COMBINED N/A 12/14/2018    Procedure: Flexible And Rigid Bronchoscopy, Balloon Dilation, Bronchial Washing;  Surgeon: Wilber Lin MD;  [facility]    BRONCHOSCOPY, DILATE BRONCHUS, STENT BRONCHUS, COMBINED N/A 1/17/2019    Procedure: Flexible And Rigid Bronchoscopy, Balloon Dilation.;  Surgeon: Wilber Lin MD;  [facility]    BRONCHOSCOPY, DILATE BRONCHUS, STENT BRONCHUS, COMBINED N/A 3/7/2019    Procedure: Flexible and Rigid Bronchoscopy, Bronchial Washing, Balloon Dilation;  Surgeon: Wilber Lin MD;  [facility]    BRONCHOSCOPY, DILATE BRONCHUS, STENT BRONCHUS, COMBINED N/A 6/6/2019    Procedure: Rigid and Flexible Bronchoscopy, Balloon Dilation;  Surgeon: Wilber Lin MD;  [facility]    BRONCHOSCOPY, DILATE BRONCHUS, STENT BRONCHUS, COMBINED N/A 10/11/2019    Procedure: Flexible and Rigid Bronchoscopy, Balloon Dilation, Bronchoalveolar Lagave;  Surgeon: Wilber Lin MD;  [facility]    BRONCHOSCOPY, DILATE BRONCHUS, STENT BRONCHUS, COMBINED N/A 2/19/2021    Procedure: BRONCHOSCOPY, flexible, airway dilation, and bronchial wash;  Surgeon: Wilber Lin MD;  [facility]    BRONCHOSCOPY, DILATE BRONCHUS, STENT BRONCHUS, COMBINED N/A 4/9/2021    Procedure: BRONCHOSCOPY, flexible and rigid, Airway suctioning;  Surgeon: Mati Norris MD;  [facility]    BRONCHOSCOPY, DILATE BRONCHUS, STENT BRONCHUS, COMBINED N/A 10/17/2023    Procedure: RIGID, flexible bronchoscopy with airway dilation;  Surgeon: Preet Patel MD;  [facility]    CV RIGHT HEART CATH MEASUREMENTS RECORDED N/A 3/10/2020    Procedure: CV RIGHT HEART CATH;  Surgeon: Wai Garcia MD;   [facility]    ENT SURGERY      tonsillectomy as a child    ESOPHAGOSCOPY, GASTROSCOPY, DUODENOSCOPY (EGD), COMBINED N/A 10/29/2018    Procedure: COMBINED ESOPHAGOSCOPY, GASTROSCOPY, DUODENOSCOPY (EGD) with biopsies and polypectomy;  Surgeon: Chente Bloom MD;  [facility]    INSERT EXTRACORPORAL MEMBRANE OXYGENATOR ADULT (OUTSIDE OR) N/A 2/27/2018    Procedure: INSERT EXTRACORPORAL MEMBRANE OXYGENATOR ADULT (OUTSIDE OR);  INSERT EXTRACORPORAL MEMBRANE OXYGENATOR ADULT (OUTSIDE OR) ;  Surgeon: Toby Hernandez MD;  [facility]    IR CVC TUNNEL PLACEMENT > 5 YRS OF AGE  10/25/2019    IR DIALYSIS FISTULOGRAM LEFT  3/2/2021    IR DIALYSIS FISTULOGRAM LEFT  11/2/2023    IR DIALYSIS FISTULOGRAM LEFT  3/5/2024    IR DIALYSIS MECH THROMB, PTA  3/2/2021    IR DIALYSIS PTA  11/2/2023    IR FLUORO 0-1 HOUR  5/7/2021    IR GASTRO JEJUNOSTOMY TUBE PLACEMENT  2/16/2021    IR PARACENTESIS  1/8/2020    IR THORACENTESIS  9/13/2019    IR TRANSCATHETER BIOPSY  1/8/2020    LASER CO2 BRONCHOSCOPY N/A 4/30/2021    Procedure: Flexible and Rigid Bronchoscopy and Tissue Debulking with CO2 Laser Assistance;  Surgeon: Mati Norris MD;  [facility]    LASER CO2 BRONCHOSCOPY N/A 6/11/2021    Procedure: BRONCHOSCOPY, Flexible and Rigid Bronchoscopy, Tissue Debulking with cryo Assistance, airway dilation,;  Surgeon: Mati Norris MD;  [facility]    LASER CO2 BRONCHOSCOPY N/A 9/16/2021    Procedure: BRONCHOSCOPY, flexible and rigid, CO2 Laser Debulking;  Surgeon: Mati Norris MD;  [facility]    LASER CO2 BRONCHOSCOPY N/A 2/11/2022    Procedure: flexible, rigid bronchoscopy, tissue debulking, airway dilation, co2 laser, bronchoalveolar lavage;  Surgeon: Juana Baugh MD;  [facility]    LASER CO2 BRONCHOSCOPY Right 11/17/2023    Procedure: flexible bronchosocopy, tissue/tumor debulking, using CO2 laser, mitomycin application and argon plasma coagulation;  Surgeon:  Mati Norris MD;  Location:  OR    LASER CO2 BRONCHOSCOPY N/A 2/15/2024    Procedure: Flexible, Rigid Bronchoscopy,Tumor/Tissue debulking using Carbon Dioxide (CO2) laser; balloon dilation;  Surgeon: Wilber Lin MD;  Location:  OR    no prior surgery      REMOVE EXTRACORPORAL MEMBRANE OXYGENATOR ADULT N/A 3/3/2018    Procedure: REMOVE EXTRACORPORAL MEMBRANE OXYGENATOR ADULT;  Removal of Right Femoral Venous and Right Axillary Arterial Extracorporeal Membrane Oxygenator;  Surgeon: Toby Hernandez MD;  [facility]    TRANSPLANT LUNG RECIPIENT SINGLE X2 Bilateral 3/1/2018    Procedure: TRANSPLANT LUNG RECIPIENT SINGLE X2;  Median Sternotomy, Extracorporeal Membrane Oxygenator, bilateral sequential lung transplant;  Surgeon: Toby Hernandez MD;  Location:  OR            Social History:     Social History     Socioeconomic History    Marital status:      Spouse name: Not on file    Number of children: Not on file    Years of education: Not on file    Highest education level: Not on file   Occupational History    Not on file   Tobacco Use    Smoking status: Never    Smokeless tobacco: Never   Substance and Sexual Activity    Alcohol use: No     Alcohol/week: 1.0 standard drink of alcohol     Types: 1 Glasses of wine per week    Drug use: No    Sexual activity: Not on file   Other Topics Concern    Parent/sibling w/ CABG, MI or angioplasty before 65F 55M? No   Social History Narrative    3/6/2019 - Lives with . Has three daughters. Four grandchildren (two ). No pets. Travelled previously to [facility]. Has visited [place] several times.      Social Determinants of Health     Financial Resource Strain: Low Risk  (2022)    [facility]    Overall Financial Resource Strain (CARDIA)     Difficulty of Paying Living Expenses: Not hard at all   Food Insecurity: No Food Insecurity  (9/20/2022)    [facility]    Hunger Vital Sign     Worried About Running Out of Food in the Last Year: Never true     Ran Out of Food in the Last Year: Never true   Transportation Needs: No Transportation Needs (9/20/2022)    [facility]    PRAPARE - Transportation     Lack of Transportation (Medical): No     Lack of Transportation (Non-Medical): No   Physical Activity: Inactive (9/20/2022)    [facility]    Exercise Vital Sign     Days of Exercise per Week: 0 days     Minutes of Exercise per Session: 0 min   Stress: No Stress Concern Present (9/20/2022)    [facility]    [place] [employer] - Occupational Stress Questionnaire     Feeling of Stress : Not at all   Social Connections: Moderately Integrated (9/20/2022)    [facility]    Social Connection and Isolation Panel [NHANES]     Frequency of Communication with Friends and Family: Twice a week     Frequency of Social Gatherings with Friends and Family: Twice a week     Attends Jainism Services: More than 4 times per year     Active Member of Clubs or Organizations: No     Attends Club or Organization Meetings: Not on file     Marital Status:    Interpersonal Safety: Not At Risk (4/15/2024)    Received from [facility]    Intimate Partner Violence     Are you in a relationship where you are physically hurt, threatened and/or made to feel afraid?: No   Housing Stability: Unknown (9/20/2022)    [facility]    Housing Stability     In the last 12 months, was there a time when you were not able to pay the  mortgage or rent on time?: No     Number of Places Lived in the Last Year: Not on file     Number of Places Lived in the Last Year (Outpatient): Not on file     In the last 12 months, was there a time when you did not have a steady place to sleep or slept in a shelter (including now)?: No            Family History:     Family History   Problem Relation Age of Onset    Hypertension Mother     Arthritis Mother     Pancreatic Cancer Father     Diabetes Father             Allergies:     Allergies   Allergen Reactions    Isopropyl Alcohol Other (See Comments)     The alcohol burns the open areas on her skin when used as a skin prep for procedures    Vancomycin Other (See Comments)     Diffuse erythroderma with itching (improved with Benadryl) after receiving vancomycin over 1 hour. 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 Can use vancomycin in the future, but please give over slower infusion time (at least 2 hours) and premedicate with Benadryl.            Medications:     Current Facility-Administered Medications   Medication Dose Route Frequency Provider Last Rate Last Admin    diphenhydrAMINE (BENADRYL) injection 25 mg  25 mg Intravenous Once Nikita Schmidt MD        pharmacy alert - intermittent dosing  1 each Other See Admin Instructions Nikita Schmidt MD        piperacillin-tazobactam (ZOSYN) 2.25 g vial to attach to  ml bag  2.25 g Intravenous Once Nikita Schmidt MD             Review of Systems:  Unable to obtain review of system as patient was confused         Physical Exam:   Temp:  [97.5  F (36.4  C)] 97.5  F (36.4  C)  Pulse:  [67-79] 69  Resp:  [20] 20  BP: ()/(55-71) 128/65  FiO2 (%):  [40 %] 40 %  SpO2:  [93 %-100 %] 100 %  General: Awake, alert and in no apparent distress , on BiPAP

## 2024-06-18 NOTE — PROGRESS NOTES
Admitted/transferred from: [facility] ER  Reason for admission/transfer: New pneumonia, respiratory acidosis, increasing oxygen needs.   Patient status upon admission/transfer: On 6L oxymask, vitally stable, one PIV in place, Aox4.  Interventions: Labs sent, Bcx sent, respiratory swabs sent, ECG done.   Plan: Bcx, abx, place back on BiPAP per MICU team d/t narrowing airways.   2 RN skin assessment: completed by Yenni DEL ROSARIO and Ashish MOROCHO  Sexual Orientation and Gender Identity (SOGI) smartfom completed: Done  Result of skin assessment and interventions/actions: Blanchable redness to back and coccyx, preventative mepilex applied. Dry lips/mucosa, swabbed with antiseptic and lip lub applied. Dusky toes, baseline from Raynauds per patient.   Height, weight, drug calc weight: Done, see flowsheets  Patient belongings (see Flowsheet - Adult Profile for details): Clothes, shoes, and glasses at bedside.   MDRO education (if applicable):  MRSA

## 2024-06-18 NOTE — ED PROVIDER NOTES
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 The patient uses 3L of O2 at home at baseline, but now requires 4L. She also notes that her sinus have been full recently. Additionally, she is scheduled to undergo a stent placement on 6/26/2024.     Past Medical History  Past Medical History:   Diagnosis Date    Acute on chronic respiratory failure with hypoxia (H) 02/21/2018    Anisocoria     Antisynthetase syndrome (H24) 2014    Anxiety     Aspergillus (H)     Aspergillus pneumonia (H) 11/20/2020    Benign essential hypertension     C. difficile colitis     Cytomegalovirus (CMV) viremia (H)     Dermatomyositis (H)     Dysplasia of cervix, low grade (ESTRADA 1)     EBV (Franklin-Barr virus) viremia     ESRD (end stage renal disease) on dialysis (H)     ILD (interstitial lung disease) (H)     Lung replaced by transplant (H)     Osteopenia     Paroxysmal atrial fibrillation (H)     Pneumocystis jiroveci pneumonia (H)     PONV (postoperative nausea and vomiting)     Post-menopause     Pulmonary hypertension (H)     Raynaud's disease     Seronegative rheumatoid arthritis (H)      Past Surgical History:   Procedure Laterality Date    BRONCHOSCOPY (RIGID OR FLEXIBLE), DIAGNOSTIC N/A 4/10/2018    Procedure: COMBINED BRONCHOSCOPY (RIGID OR FLEXIBLE), LAVAGE;;  Surgeon: Mariposa Donohue MD;  Location:  GI    BRONCHOSCOPY (RIGID OR FLEXIBLE), DIAGNOSTIC N/A 12/23/2020    Procedure: BRONCHOSCOPY, WITH BRONCHOALVEOLAR  LAVAGE;  Surgeon: Uri Bass MD;  Location: [facility]    BRONCHOSCOPY (RIGID OR FLEXIBLE), DIAGNOSTIC N/A 5/26/2022    Procedure: BRONCHOSCOPY, WITH BRONCHOALVEOLAR LAVAGE;  Surgeon: Uri Bass MD;  Location: [facility]    BRONCHOSCOPY (RIGID OR FLEXIBLE), DIAGNOSTIC N/A 8/17/2023    Procedure: BRONCHOSCOPY, WITH BRONCHOALVEOLAR LAVAGE;  Surgeon: Esau Bruno MD;  Location: [facility]    BRONCHOSCOPY (RIGID OR FLEXIBLE), DIAGNOSTIC N/A 11/1/2023    Procedure: BRONCHOSCOPY, WITH BRONCHOALVEOLAR LAVAGE;  Surgeon: Beto Magaña DO;  Location: [facility]    BRONCHOSCOPY (RIGID OR FLEXIBLE), DILATE BRONCHUS / TRACHEA N/A 10/11/2018    Procedure: BRONCHOSCOPY (RIGID OR FLEXIBLE), DILATE BRONCHUS / TRACHEA;  Flexible And Rigid Bronchoscopy and Dilation;  Surgeon: Wilber Lin MD;  [facility]    BRONCHOSCOPY (RIGID OR FLEXIBLE), DILATE BRONCHUS / TRACHEA N/A 11/1/2023    Procedure: Bronchoscopy (Rigid Or Flexible), Dilate Bronchus / Trachea;  Surgeon: Beto Magaña DO;  Location: [facility]    BRONCHOSCOPY FLEXIBLE N/A 3/13/2018    Procedure: BRONCHOSCOPY FLEXIBLE;  Flexible Bronchoscopy ;  Surgeon: Gissell Sanchez MD;  [facility]: [facility]    BRONCHOSCOPY FLEXIBLE N/A 5/9/2018    Procedure: BRONCHOSCOPY FLEXIBLE;;  Surgeon: Wilber Lin MD;  [facility]: [facility]    BRONCHOSCOPY FLEXIBLE AND RIGID N/A 9/10/2018    Procedure: BRONCHOSCOPY FLEXIBLE AND RIGID;  Flexible and Rigid Bronchoscopy with Balloon Dilation, tissue debulking with cryo, and Right mainstem bronchus stent placement;  Surgeon: Wilber Lin MD;  [facility]    BRONCHOSCOPY RIGID N/A 6/6/2018    Procedure: BRONCHOSCOPY RIGID;;  Surgeon: Lopez Macias MD;  [facility]    BRONCHOSCOPY, DILATE BRONCHUS, STENT BRONCHUS, COMBINED N/A 6/11/2018    Procedure: COMBINED BRONCHOSCOPY, DILATE BRONCHUS, STENT BRONCHUS;  Flexible Bronchoscopy, Balloon Dilation, Bronchial Washings;  Surgeon: Wilber Lin,  MD;  [facility]    BRONCHOSCOPY, DILATE BRONCHUS, STENT BRONCHUS, COMBINED Right 7/10/2018    Procedure: COMBINED BRONCHOSCOPY, DILATE BRONCHUS, STENT BRONCHUS;  Flexible Bronchoscopy, Balloon Dilation, Bronchial Washings  ;  Surgeon: Wilber Lin MD;  [facility]    BRONCHOSCOPY, DILATE BRONCHUS, STENT BRONCHUS, COMBINED N/A 8/2/2018    Procedure: COMBINED BRONCHOSCOPY, DILATE BRONCHUS, STENT BRONCHUS;  Flexible Bronchoscopy, Bronchial Washings, Balloon Dilation;  Surgeon: Wilber Lin MD;  [facility]    BRONCHOSCOPY, DILATE BRONCHUS, STENT BRONCHUS, COMBINED N/A 8/20/2018    Procedure: COMBINED BRONCHOSCOPY, DILATE BRONCHUS, STENT BRONCHUS;  Flexible Bronchoscopy, Balloon Dilation;  Surgeon: Wilber Lin MD;  [facility]    BRONCHOSCOPY, DILATE BRONCHUS, STENT BRONCHUS, COMBINED N/A 10/29/2018    Procedure: Flexible Bronchoscopy, Balloon Dilation, Stent Revision, Airway Examination And Therapeutic Suctioning, Cyro Tumor Debulking;  Surgeon: Wilber Lin MD;  [facility]    BRONCHOSCOPY, DILATE BRONCHUS, STENT BRONCHUS, COMBINED N/A 11/20/2018    Procedure: Rigid Bronchoscopy, Stent Removal and dilitation;  Surgeon: Wilber Lin MD;  [facility]    BRONCHOSCOPY, DILATE BRONCHUS, STENT BRONCHUS, COMBINED N/A 12/14/2018    Procedure: Flexible And Rigid Bronchoscopy, Balloon Dilation, Bronchial Washing;  Surgeon: Wilber Lin MD;  [facility]    BRONCHOSCOPY, DILATE BRONCHUS, STENT BRONCHUS, COMBINED N/A 1/17/2019    Procedure: Flexible And Rigid Bronchoscopy, Balloon Dilation.;  Surgeon: Wilber Lin MD;  [facility]    BRONCHOSCOPY, DILATE BRONCHUS, STENT BRONCHUS, COMBINED N/A 3/7/2019    Procedure: Flexible and Rigid Bronchoscopy, Bronchial Washing, Balloon Dilation;  Surgeon: Wilber Lin MD;  [facility]    BRONCHOSCOPY, DILATE BRONCHUS, STENT BRONCHUS, COMBINED N/A 6/6/2019    Procedure: Rigid  and Flexible Bronchoscopy, Balloon Dilation;  Surgeon: Wilber Lin MD;  [facility]    BRONCHOSCOPY, DILATE BRONCHUS, STENT BRONCHUS, COMBINED N/A 10/11/2019    Procedure: Flexible and Rigid Bronchoscopy, Balloon Dilation, Bronchoalveolar Lagave;  Surgeon: Wilber Lin MD;  [facility]    BRONCHOSCOPY, DILATE BRONCHUS, STENT BRONCHUS, COMBINED N/A 2/19/2021    Procedure: BRONCHOSCOPY, flexible, airway dilation, and bronchial wash;  Surgeon: Wilber Lin MD;  [facility]    BRONCHOSCOPY, DILATE BRONCHUS, STENT BRONCHUS, COMBINED N/A 4/9/2021    Procedure: BRONCHOSCOPY, flexible and rigid, Airway suctioning;  Surgeon: Mati Norris MD;  [facility]    BRONCHOSCOPY, DILATE BRONCHUS, STENT BRONCHUS, COMBINED N/A 10/17/2023    Procedure: RIGID, flexible bronchoscopy with airway dilation;  Surgeon: Preet Patel MD;  [facility]    CV RIGHT HEART CATH MEASUREMENTS RECORDED N/A 3/10/2020    Procedure: CV RIGHT HEART CATH;  Surgeon: Wai Garcia MD;  [facility]    ENT SURGERY      tonsillectomy as a child    ESOPHAGOSCOPY, GASTROSCOPY, DUODENOSCOPY (EGD), COMBINED N/A 10/29/2018    Procedure: COMBINED ESOPHAGOSCOPY, GASTROSCOPY, DUODENOSCOPY (EGD) with biopsies and polypectomy;  Surgeon: Chente Bloom MD;  [facility]    INSERT EXTRACORPORAL MEMBRANE OXYGENATOR ADULT (OUTSIDE OR) N/A 2/27/2018    Procedure: INSERT EXTRACORPORAL MEMBRANE OXYGENATOR ADULT (OUTSIDE OR);  INSERT EXTRACORPORAL MEMBRANE OXYGENATOR ADULT (OUTSIDE OR) ;  Surgeon: Toby Hernandez MD;  [facility]    IR CVC TUNNEL PLACEMENT > 5 YRS OF AGE  10/25/2019    IR DIALYSIS FISTULOGRAM LEFT  3/2/2021    IR DIALYSIS FISTULOGRAM LEFT  11/2/2023    IR DIALYSIS FISTULOGRAM LEFT  3/5/2024    IR DIALYSIS MECH THROMB, PTA  3/2/2021    IR DIALYSIS PTA  11/2/2023    IR FLUORO 0-1 HOUR  5/7/2021    IR GASTRO JEJUNOSTOMY TUBE PLACEMENT  2/16/2021     IR PARACENTESIS  1/8/2020    IR THORACENTESIS  9/13/2019    IR TRANSCATHETER BIOPSY  1/8/2020    LASER CO2 BRONCHOSCOPY N/A 4/30/2021    Procedure: Flexible and Rigid Bronchoscopy and Tissue Debulking with CO2 Laser Assistance;  Surgeon: Mati Norris MD;  [facility]    LASER CO2 BRONCHOSCOPY N/A 6/11/2021    Procedure: BRONCHOSCOPY, Flexible and Rigid Bronchoscopy, Tissue Debulking with cryo Assistance, airway dilation,;  Surgeon: Mati Norris MD;  [facility]    LASER CO2 BRONCHOSCOPY N/A 9/16/2021    Procedure: BRONCHOSCOPY, flexible and rigid, CO2 Laser Debulking;  Surgeon: Mati Norris MD;  [facility]    LASER CO2 BRONCHOSCOPY N/A 2/11/2022    Procedure: flexible, rigid bronchoscopy, tissue debulking, airway dilation, co2 laser, bronchoalveolar lavage;  Surgeon: Juana Baugh MD;  [facility]    LASER CO2 BRONCHOSCOPY Right 11/17/2023    Procedure: flexible bronchosocopy, tissue/tumor debulking, using CO2 laser, mitomycin application and argon plasma coagulation;  Surgeon: Mati Norris MD;  [facility]    LASER CO2 BRONCHOSCOPY N/A 2/15/2024    Procedure: Flexible, Rigid Bronchoscopy,Tumor/Tissue debulking using Carbon Dioxide (CO2) laser; balloon dilation;  Surgeon: Wilber Lin MD;  [facility]    no prior surgery      REMOVE EXTRACORPORAL MEMBRANE OXYGENATOR ADULT N/A 3/3/2018    Procedure: REMOVE EXTRACORPORAL MEMBRANE OXYGENATOR ADULT;  Removal of Right Femoral Venous and Right Axillary Arterial Extracorporeal Membrane Oxygenator;  Surgeon: Toby Hernandez MD;  [facility]    TRANSPLANT LUNG RECIPIENT SINGLE X2 Bilateral 3/1/2018    Procedure: TRANSPLANT LUNG RECIPIENT SINGLE X2;  Median Sternotomy, Extracorporeal Membrane Oxygenator, bilateral sequential lung transplant;  Surgeon: Toby Hernandez MD;  [facility]     acetaminophen (TYLENOL) 325 MG tablet  acetylcysteine (MUCOMYST) 10 % nebulizer solution  albuterol  (PROVENTIL) (2.5 MG/3ML) 0.083% neb solution  amoxicillin (AMOXIL) 875 MG tablet  azithromycin (ZITHROMAX) 250 MG tablet  benzonatate (TESSALON) 100 MG capsule  cholecalciferol 50 MCG (2000 UT) CAPS  fluticasone-salmeterol (ADVAIR) 250-50 MCG/ACT inhaler  guaiFENesin (MUCINEX) 600 MG 12 hr tablet  Magnesium Cl-Calcium Carbonate (SLOW-MAG) 71.5-119 MG TBEC  metoprolol tartrate (LOPRESSOR) 25 MG tablet  montelukast (SINGULAIR) 10 MG tablet  pantoprazole (PROTONIX) 40 MG EC tablet  posaconazole (NOXAFIL) 100 MG EC tablet  predniSONE (DELTASONE) 5 MG tablet  sulfamethoxazole-trimethoprim (BACTRIM) 400-80 MG tablet  tacrolimus (GENERIC) 1 mg/mL suspension      Allergies   Allergen Reactions    Isopropyl Alcohol Other (See Comments)     The alcohol burns the open areas on her skin when used as a skin prep for procedures    Vancomycin Other (See Comments)     Diffuse erythroderma with itching (improved with Benadryl) after receiving vancomycin over 1 hour. 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 Can use vancomycin in the future, but please give over slower infusion time (at least 2 hours) and premedicate with Benadryl.     Family History  Family History   Problem Relation Age of Onset    Hypertension Mother     Arthritis Mother     Pancreatic Cancer Father     Diabetes Father      Social History   Social History     Tobacco Use    Smoking status: Never    Smokeless tobacco: Never   Substance Use Topics    Alcohol use: No     Alcohol/week: 1.0 standard drink of alcohol     Types: 1 Glasses of wine per week    Drug use: No      Past medical history, past surgical history, medications, allergies, family history, and social history were reviewed with the patient. No additional pertinent items.   A medically appropriate review of systems was performed with pertinent positives and negatives noted in the HPI, and all other systems negative.    Physical  "Exam   BP: 138/71  Pulse: 79  Temp: 97.5  F (36.4  C)  Resp: 20  Height: 160 cm (5' 3\")  Weight: 50.8 kg (112 lb)  SpO2: 93 %  Physical Exam  Vitals and nursing note reviewed.   Constitutional:       General: She is not in acute distress.     Appearance: She is not diaphoretic.   HENT:      Head: Normocephalic and atraumatic.      Mouth/Throat:      Mouth: Mucous membranes are dry.   Eyes:      Extraocular Movements: Extraocular movements intact.      Conjunctiva/sclera: Conjunctivae normal.   Cardiovascular:      Rate and Rhythm: Normal rate and regular rhythm.      Heart sounds: Normal heart sounds. No murmur heard.     No friction rub. No gallop.   Pulmonary:      Effort: Tachypnea, accessory muscle usage and respiratory distress present. No bradypnea, prolonged expiration or retractions.      Breath sounds: Decreased air movement present. No stridor. Examination of the right-middle field reveals decreased breath sounds and rhonchi. Examination of the right-lower field reveals decreased breath sounds and rhonchi. Decreased breath sounds and rhonchi present. No wheezing or rales.   Chest:      Chest wall: No tenderness.   Abdominal:      General: Abdomen is flat. Bowel sounds are normal. There is no distension.      Palpations: Abdomen is soft. There is no mass.      Tenderness: There is no abdominal tenderness. There is no right CVA tenderness, left CVA tenderness, guarding or rebound.      Hernia: No hernia is present.   Musculoskeletal:         General: No tenderness. Normal range of motion.      Cervical back: Normal range of motion and neck supple.   Skin:     General: Skin is warm.      Findings: No rash.   Neurological:      General: No focal deficit present.      Cranial Nerves: No cranial nerve deficit.           ED Course, Procedures, & Data      Procedures            EKG Interpretation:      Interpreted by Nikita Schmidt MD, MD  Time reviewed: on arrival  Symptoms at time of EKG: SOB   Rhythm: normal " sinus   Rate: Normal  Axis: Normal  Ectopy: none  Conduction: normal  ST Segments/ T Waves: No ST-T wave changes  Q Waves: none  Comparison to prior: Unchanged from 11/2023    Clinical Impression: no acute changes                  Results for orders placed or performed during the hospital encounter of 06/18/24   CT Chest Hi-Resolution wo Contrast     Status: None    Narrative    High-resolution chest CT without contrast    INDICATION: Worsening shortness of breath. Post lung transplant with  stent.    COMPARISON: Similar examination to/14/24    FINDINGS: No contrast. Inspiration and expiration supine imaging  obtained. Included thyroid appears unremarkable. Median sternotomy  from prior bilateral lung transplantation. Heart size is borderline  enlarged. Left atrium is enlarged. There are mitral annular  calcifications which appear to correlate with a left atrial  enlargement, please also correlate for any evidence of cardiac  arrhythmia. There is no pericardial effusion. There are small  bilateral pleural effusions. Rim calcified density within the medial  aspect left pleural effusion is unchanged. No enlarged axillary lymph  nodes. No enlarged mediastinal or hilar lymph nodes. The thoracic  aorta does show some mild atherosclerotic calcification. It is  borderline enlarged in size with its midascending portion approximate  4.0 cm in caliber. The main pulmonary artery is also borderline  enlarged at 3.3 cm.  Small calcific densities in the nondistended gallbladder consistent  with gallstones. Parallel tram track calcifications of the tortuous  splenic artery are also noted, please correlate for any evidence of  chronic renal disease. No other suspicious upper abdominal finding.  Bone detail shows T5 osteoporotic compression deformity unchanged. The  sternal cerclage wires appear grossly intact. No sternal erosions.    Detail of the lungs shows diffuse consolidative than some  nodular/patchy densities in the right  upper and lower lobes as well as  the lingula and left lower lobe. Other subtle nodular densities  throughout the lung apices are less apparent than on the lower lobes.  Azygos fissure anatomical variant in the right upper lobe medially.  Comparison with the February examination shows the findings to show  more scattered opacities throughout the lower lobes especially on the  right.  There is narrowing of bronchus intermedius and also the right mainstem  bronchus distal to the mainstem anastomosis from transplant. There is  new occlusion of the right middle lobe bronchus from its takeoff  distally. Narrowing of the right lower lobe distal aspect of the lobar  level the bronchus with severe narrowings of the right posterior and  medial basilar segment airways.  Expiratory imaging shows mild air trapping.      Impression    IMPRESSION: Bilateral transplanted lungs. Slight decreased confluent  consolidative opacities in right lower lobe with more micronodular  component of possible infection in the right lower lobe. Other  abnormalities throughout the lungs similar to February of the  transplanted lungs. Pleural effusions again noted. Narrowing of the  bronchus intermedius on the right there appears to be occlusion of the  right middle lobe bronchus with narrowing of the distal aspect of the  right lower lobe bronchus with short segment occlusions of the medial  and posterior basilar bronchial segments to the right lower lobe. No  such abnormality on the left.. No ectopic air. Anastomoses appear  grossly intact bilaterally with just under approximate 50% narrowing  distal to the right mainstem anastomosis proximal to the upper lobe  bronchus takeoff. This right middle lobe bronchial occlusion is new  from February.    Mitral annular calcifications with left atrial prominence, please  correlate for arrhythmia.  Borderline pulmonary enlargement concerning for possible pulmonary  hypertension.  Borderline mid ascending  aortic ectasia.    TERRI ISSA MD         SYSTEM ID:  [id number]   INR     Status: Normal   Result Value Ref Range    INR 0.98 0.85 - 1.15   Comprehensive metabolic panel     Status: Abnormal   Result Value Ref Range    Sodium 135 135 - 145 mmol/L    Potassium 4.6 3.4 - 5.3 mmol/L    Carbon Dioxide (CO2) 26 22 - 29 mmol/L    Anion Gap 8 7 - 15 mmol/L    Urea Nitrogen 16.4 8.0 - 23.0 mg/dL    Creatinine 3.13 (H) 0.51 - 0.95 mg/dL    GFR Estimate 16 (L) >60 mL/min/1.73m2    Calcium 8.7 (L) 8.8 - 10.2 mg/dL    Chloride 101 98 - 107 mmol/L    Glucose 107 (H) 70 - 99 mg/dL    Alkaline Phosphatase 237 (H) 40 - 150 U/L    AST      ALT 39 0 - 50 U/L    Protein Total 6.5 6.4 - 8.3 g/dL    Albumin 3.6 3.5 - 5.2 g/dL    Bilirubin Total 0.6 <=1.2 mg/dL   Procalcitonin     Status: Normal   Result Value Ref Range    Procalcitonin 0.23 <0.50 ng/mL   CRP inflammation     Status: Abnormal   Result Value Ref Range    CRP Inflammation 8.74 (H) <5.00 mg/L   Erythrocyte sedimentation rate auto     Status: Normal   Result Value Ref Range    Erythrocyte Sedimentation Rate 10 0 - 30 mm/hr   CBC with platelets and differential     Status: Abnormal   Result Value Ref Range    WBC Count 4.5 4.0 - 11.0 10e3/uL    RBC Count 3.85 3.80 - 5.20 10e6/uL    Hemoglobin 12.1 11.7 - 15.7 g/dL    Hematocrit 39.7 35.0 - 47.0 %     (H) 78 - 100 fL    MCH 31.4 26.5 - 33.0 pg    MCHC 30.5 (L) 31.5 - 36.5 g/dL    RDW 14.8 10.0 - 15.0 %    Platelet Count 147 (L) 150 - 450 10e3/uL    % Neutrophils 73 %    % Lymphocytes 10 %    % Monocytes 15 %    % Eosinophils 1 %    % Basophils 0 %    % Immature Granulocytes 1 %    NRBCs per 100 WBC 0 <1 /100    Absolute Neutrophils 3.3 1.6 - 8.3 10e3/uL    Absolute Lymphocytes 0.4 (L) 0.8 - 5.3 10e3/uL    Absolute Monocytes 0.7 0.0 - 1.3 10e3/uL    Absolute Eosinophils 0.0 0.0 - 0.7 10e3/uL    Absolute Basophils 0.0 0.0 - 0.2 10e3/uL    Absolute Immature Granulocytes 0.1 <=0.4 10e3/uL    Absolute NRBCs 0.0  10e3/uL   Symptomatic Influenza A/B, RSV, & SARS-CoV2 PCR (COVID-19) Nose     Status: Normal    Specimen: Nose; Swab   Result Value Ref Range    Influenza A PCR Negative Negative    Influenza B PCR Negative Negative    RSV PCR Negative Negative    SARS CoV2 PCR Negative Negative    Narrative    Testing was performed using the Xpert Xpress CoV2/Flu/RSV Assay on the [employer] GeneXpert Instrument. This test should be ordered for the detection of SARS-CoV-2, influenza, and RSV viruses in individuals who meet clinical and/or epidemiological criteria. Test performance is unknown in asymptomatic patients. This test is for in vitro diagnostic use under the FDA EUA for laboratories certified under CLIA to perform high or moderate complexity testing. This test has not been FDA cleared or approved. A negative result does not rule out the presence of PCR inhibitors in the specimen or target RNA in concentration below the limit of detection for the assay. If only one viral target is positive but coinfection with multiple targets is suspected, the sample should be re-tested with another FDA cleared, approved, or authorized test, if coinfection would change clinical management. This test was validated by the [facility] These laboratories are certified under the Clinical Laboratory Improvement Amendments of 1988 (CLIA-88) as qualified to perform high complexity laboratory testing.   iStat Gases (lactate) venous, POCT     Status: Abnormal   Result Value Ref Range    Lactic Acid POCT 0.4 <=2.0 mmol/L    Bicarbonate Venous POCT 31 (H) 21 - 28 mmol/L    O2 Sat, Venous POCT 59 (L) 70 - 75 %    pCO2 Venous POCT 88 (HH) 40 - 50 mm Hg    pH Venous POCT 7.15 (LL) 7.32 - 7.43    pO2 Venous POCT 41 25 - 47 mm Hg    Base Excess/Deficit (+/-) POCT 0.0 -3.0 - 3.0 mmol/L   iStat Gases (lactate) venous, POCT     Status: Abnormal   Result Value Ref Range    Lactic Acid POCT 0.5 <=2.0 mmol/L    Bicarbonate Venous POCT 29 (H) 21 -  28 mmol/L    O2 Sat, Venous POCT 69 (L) 70 - 75 %    pCO2 Venous POCT 55 (H) 40 - 50 mm Hg    pH Venous POCT 7.33 7.32 - 7.43    pO2 Venous POCT 40 25 - 47 mm Hg    Base Excess/Deficit (+/-) POCT 2.0 -3.0 - 3.0 mmol/L   EKG 12-lead, tracing only     Status: None   Result Value Ref Range    Systolic Blood Pressure  mmHg    Diastolic Blood Pressure  mmHg    Ventricular Rate 70 BPM    Atrial Rate 70 BPM    MT Interval 172 ms    QRS Duration 126 ms     ms    QTc 444 ms    P Axis 60 degrees    R AXIS -27 degrees    T Axis -28 degrees    Interpretation ECG       Sinus rhythm  Left ventricular hypertrophy with QRS widening  Cannot rule out Septal infarct , age undetermined  Abnormal ECG  Unconfirmed report - interpretation of this ECG is computer generated - see medical record for final interpretation  Confirmed by - EMERGENCY ROOM, PHYSICIAN ([id number]),  RAUL GONSALEZ ([id number]) on 6/18/2024 2:54:12 PM     Adult Type and Screen     Status: None   Result Value Ref Range    ABO/RH(D) A POS     Antibody Screen Negative Negative    SPECIMEN EXPIRATION DATE [id number]    CBC with platelets differential     Status: Abnormal    Narrative    The following orders were created for panel order CBC with platelets differential.  Procedure                               Abnormality         Status                     ---------                               -----------         ------                     CBC with platelets and d...[[id number]]  Abnormal            Final result                 Please view results for these tests on the individual orders.   ABO/Rh type and screen     Status: None    Narrative    The following orders were created for panel order ABO/Rh type and screen.  Procedure                               Abnormality         Status                     ---------                               -----------         ------                     Adult Type and Screen[[id number]]                            Final result                  Please view results for these tests on the individual orders.     Medications   No lozenges or gum should be given while patient on BIPAP/AVAPS/AVAPS AE (has no administration in time range)   carboxymethylcellulose PF (REFRESH PLUS) 0.5 % ophthalmic solution 1 drop (has no administration in time range)   HOLD: All Oral Medications (has no administration in time range)     Labs Ordered and Resulted from Time of ED Arrival to Time of ED Departure   COMPREHENSIVE METABOLIC PANEL - Abnormal       Result Value    Sodium 135      Potassium 4.6      Carbon Dioxide (CO2) 26      Anion Gap 8      Urea Nitrogen 16.4      Creatinine 3.13 (*)     GFR Estimate 16 (*)     Calcium 8.7 (*)     Chloride 101      Glucose 107 (*)     Alkaline Phosphatase 237 (*)     AST        ALT 39      Protein Total 6.5      Albumin 3.6      Bilirubin Total 0.6     CRP INFLAMMATION - Abnormal    CRP Inflammation 8.74 (*)    CBC WITH PLATELETS AND DIFFERENTIAL - Abnormal    WBC Count 4.5      RBC Count 3.85      Hemoglobin 12.1      Hematocrit 39.7       (*)     MCH 31.4      MCHC 30.5 (*)     RDW 14.8      Platelet Count 147 (*)     % Neutrophils 73      % Lymphocytes 10      % Monocytes 15      % Eosinophils 1      % Basophils 0      % Immature Granulocytes 1      NRBCs per 100 WBC 0      Absolute Neutrophils 3.3      Absolute Lymphocytes 0.4 (*)     Absolute Monocytes 0.7      Absolute Eosinophils 0.0      Absolute Basophils 0.0      Absolute Immature Granulocytes 0.1      Absolute NRBCs 0.0     ISTAT GASES LACTATE VENOUS POCT - Abnormal    Lactic Acid POCT 0.4      Bicarbonate Venous POCT 31 (*)     O2 Sat, Venous POCT 59 (*)     pCO2 Venous POCT 88 (*)     pH Venous POCT 7.15 (*)     pO2 Venous POCT 41      Base Excess/Deficit (+/-) POCT 0.0     ISTAT GASES LACTATE VENOUS POCT - Abnormal    Lactic Acid POCT 0.5      Bicarbonate Venous POCT 29 (*)     O2 Sat, Venous POCT 69 (*)     pCO2 Venous POCT 55 (*)     pH Venous POCT  7.33      pO2 Venous POCT 40      Base Excess/Deficit (+/-) POCT 2.0     INR - Normal    INR 0.98     PROCALCITONIN - Normal    Procalcitonin 0.23     ERYTHROCYTE SEDIMENTATION RATE AUTO - Normal    Erythrocyte Sedimentation Rate 10     INFLUENZA A/B, RSV, & SARS-COV2 PCR - Normal    Influenza A PCR Negative      Influenza B PCR Negative      RSV PCR Negative      SARS CoV2 PCR Negative     TROPONIN T, HIGH SENSITIVITY   ROUTINE UA WITH MICROSCOPIC REFLEX TO CULTURE   TYPE AND SCREEN, ADULT    ABO/RH(D) A POS      Antibody Screen Negative      SPECIMEN EXPIRATION DATE [id number]     ABO/RH TYPE AND SCREEN     CT Chest Hi-Resolution wo Contrast   Final Result   IMPRESSION: Bilateral transplanted lungs. Slight decreased confluent   consolidative opacities in right lower lobe with more micronodular   component of possible infection in the right lower lobe. Other   abnormalities throughout the lungs similar to February of the   transplanted lungs. Pleural effusions again noted. Narrowing of the   bronchus intermedius on the right there appears to be occlusion of the   right middle lobe bronchus with narrowing of the distal aspect of the   right lower lobe bronchus with short segment occlusions of the medial   and posterior basilar bronchial segments to the right lower lobe. No   such abnormality on the left.. No ectopic air. Anastomoses appear   grossly intact bilaterally with just under approximate 50% narrowing   distal to the right mainstem anastomosis proximal to the upper lobe   bronchus takeoff. This right middle lobe bronchial occlusion is new   from February.      Mitral annular calcifications with left atrial prominence, please   correlate for arrhythmia.   Borderline pulmonary enlargement concerning for possible pulmonary   hypertension.   Borderline mid ascending aortic ectasia.      TERRI ISSA MD            SYSTEM ID:  [id number]             Critical Care Addendum  My initial assessment, based on  my review of prehospital provider report, review of nursing observations, review of vital signs, focused history, physical exam, review of cardiac rhythm monitor, 12 lead ECG analysis, discussion with Interventional Pulmonary and MICU, and interpretation of VBG , established a high suspicion that Kecia Blue has respiratory distress, which requires immediate intervention, and therefore she is critically ill.     After the initial assessment, the care team initiated multiple lab tests, initiated IV fluid administration, initiated intensive non-invasive respiratory support, and consulted with Interventional Pulmonary  to provide stabilization care. Due to the critical nature of this patient, I reassessed nursing observations, vital signs, physical exam, review of cardiac rhythm monitor, 12 lead ECG analysis, interpretation of VBG, mental status, neurologic status, and respiratory status multiple times prior to her disposition.     Time also spent performing documentation, discussion with family to obtain medical information for decision making, reviewing test results, discussion with consultants, and coordination of care.     Critical care time (excluding teaching time and procedures): 60 minutes.       Assessment & Plan    Acute on chronic respiratory failure with hypercapnia, sleepy but still able to communicate but hard of hearing for last two days, in the pt with s/p lung transplant complicated with bronchial stenosis and failed stent, scheduled bronch with possible dilation and stent in one week but now much worse with VBG showing pH 7.15 with CO2 88-went on BIPAP with pH 7.33 with CO2 55, CT chest done, D/W Interventional Pulmonary -recommending MICU admit, D/W MICU-admit.     I have reviewed the nursing notes. I have reviewed the findings, diagnosis, plan and need for follow up with the patient.    New Prescriptions    No medications on file       Final diagnoses:   Acute on chronic respiratory failure  with hypercapnia (H)   Bronchial stenosis, right   S/P lung transplant (H)   ILayton, am serving as a trained medical scribe to document services personally performed by Nikita Schmidt MD, based on the provider's statements to me.     INikita MD, was physically present and have reviewed and verified the accuracy of this note documented by Layton Mccarthy.      Nikita Schmidt MD  [facility] EMERGENCY DEPARTMENT  6/18/2024     Nikita Schmidt MD  06/18/24 0652

## 2024-06-18 NOTE — PHARMACY-VANCOMYCIN DOSING SERVICE
"Pharmacy Vancomycin Initial Note  Date of Service 2024  Patient's  1960  63 year old, female    Indication: [facility] Pneumonia    Current estimated CrCl = Estimated Creatinine Clearance: 15.1 mL/min (A) (based on SCr of 3.13 mg/dL (H)).    Creatinine for last 3 days  2024:  2:18 PM Creatinine 3.13 mg/dL    Recent Vancomycin Level(s) for last 3 days  No results found for requested labs within last 3 days.      Vancomycin IV Administrations (past 72 hours)        No vancomycin orders with administrations in past 72 hours.                    Nephrotoxins and other renal medications (From now, onward)      Start     Dose/Rate Route Frequency Ordered Stop    24 1800  vancomycin (VANCOCIN) 1,000 mg in 200 mL dextrose intermittent infusion         1,000 mg  100 mL/hr over 2 Hours Intravenous ONCE 24 1642      24 1800  vancomycin place jordan - receiving intermittent dosing         1 each Intravenous SEE ADMIN INSTRUCTIONS 24 1643      24 1640  piperacillin-tazobactam (ZOSYN) 2.25 g vial to attach to  ml bag        Note to Pharmacy: For [facility], [facility] and [facility]: For Zosyn-naive patients, use the \"Zosyn initial dose + extended infusion\" order panel.    2.25 g  over 30 Minutes Intravenous ONCE 24 1635              Contrast Orders - past 72 hours (72h ago, onward)      None                Plan:  Start vancomycin intermittent dosing d/t ESRD on HD; 1000 mg IV x 1 today.   Vancomycin monitoring method: Trough (Method 2 = manual dose calculation)  Vancomycin therapeutic monitoring goal: 15-20 mg/L  Pharmacy will check vancomycin levels as appropriate in 1-3 Days.    Serum creatinine levels will be ordered daily for the first week of therapy and at least twice weekly for subsequent weeks.      Esther Lobo [facility]   "

## 2024-06-18 NOTE — TELEPHONE ENCOUNTER
Called Kecia and her daughter Nasreen back.  Pt states she can barely breathe at rest.  Having difficulty catching her breath. Is on the highest O2 settings. Pt's daughter states they have had to bring her to dialysis in a wheelchair the past 2 times.  When not at dialysis, patient just lays in bed.  Patient and daughter instructed to go to ER.  Pt's  feels like they are able to make it to the [facility] ER.  Advised if things worsen on the way, they need to stop at the closest ER. Pt and family expressed understanding.

## 2024-06-18 NOTE — H&P
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     CHANGES and MAJOR THINGS TODAY:   - Admit to ICU   - Continue BiPAP overnight     PLAN:    Neuro:  # Pain  - Tylenol PRN  - Avoid sedating agents as able  - Delirium precautions    #Toxic Metabolic Encephalopathy 2/2 Hypercapnia   Reports history of 2-3 days of lethargy, decreased appetite, and fatigue. No clear onset and history corroborated by spouse. Symptoms readily resolved after placement on BiPAP for 20 minutes. VBG before BiPAP with pCO2 88 rapidly decreased to 55 on BiPAP. Trial off BiPAP not successful with pCO2 on VBG back to 98. All likely in the setting of multifocal pneumonia and low respiratory reserve s/p transplant and thoracic weakness.   -Continue BiPAP overnight  -Continue VBG monitoring as per below    Pulmonary:  # Acute hypercapnic respiratory failure  # Multilobar pneumonia, community-acquired  Presented to ED on 6/18/2024 with shortness of breath, VBG with respiratory acidosis requiring BiPAP support. Consider acute hypercapnic respiratory failure 2/2 pneumonia and inability to clear secretions in setting of weak cough effort, bronchial stenosis, and decreased minute ventilation in the setting of chest wall weakness. Will cover broadly. Bicarb wnl, although  not able to mount good metabolic compensation in the setting of ESRD as per below.   - Continue BiPAP, not able to tolerate off BiPAP as per last VBG.   - Repeat VBG a 1930 to ensure mode of ventilation is appropriate. May need invasive ventilation overnight if not improving  - Continue Abx   - Zosyn + Vanco + Zithromax  - Airway clearance measures:    - Mucinex 1200 mg BID   - Mucomyst BID    - Volera QID    # Recurrent right bronchial stenosis   # Possible new right lower lower lobe stenosis   Last bronchoscopy 2/15/24 with mild narrowing of right mainstem anastomosis and bronchus intermedius s/p dilation. Previous dilations 11/2023 and 10/2023. Last had stent removed 11/2018 due to migration. Was initially scheduled for bronchoscopy with dilation on 6/26/2024. 6/18/2024 CT Chest with narrowing of the right BI with occlusion of the RML bronchus and narrowing of the distal aspect of the RLL bronchus with short segment occlusions of the medial and posterior basilar bronchial segments to RLL.  - IP consulted, appreciate recs   - No plan for urgent bronch this evening   - Continue to treat PNA    # Hx ILD 2/2 anti-synthetase syndrome s/p BSLT 3/2018 c/b CLAD  - Transplant pulm consult, appreciate recs  - PTA nebs:   - Albuterol neb BID   - Advair inhaler BID  - Immunosuppressants per Tx Pulm:    - Tacrolimus (dosing per tx pulm)   - Prednisone 5 mg daily   - Last Lung transplant visit holding AZA due to leukopenia and repeated infections   - Tacro level in the AM    Cardiovascular:  # HTN  # Hx Paroxysmal A-Fib  # Pulmonary Hypertension   Last TTE 10/2023 LVEF 60-65%, no regional wall motion abnormalities, global RV function normal, no significant valvular abnormalities, pulmonary HTN with RVSP 45 mmHg above RAP  - Not on AC   - HOLD PTA Metoprolol 25 mg BID in setting of borderline MAP  - Check troponin, EKG    # Elevated troponin  Noted troponin to 499. No recent previous troponin. No chest pain currently, nor  hypoxia. Recent TTE unimpressive except for pulm hypertension as per above. EKG without changes, no new ST elevations.   -Trend trop to peak  - Repeat EKG if new chest pain   - TTE    GI/Nutrition:  # Elevated alk phos  Appears to be chronically elevated. RUQ US 11/2023 with increased hepatic parenchymal echogenicity and mild hepatomegaly, cholelithiasis with borderline gallbladder wall thickening. Lipase negative.   - GGT and AlkPhos isoenzymes added  - Consider repeat RUQ US if uptrending  - NPO while requiring BiPAP  - Bowel regimen PRN     Renal/Fluids/Electrolytes:  # ESRD on iHD (M,W,F)  - Nephrology consult, appreciate recs    - Renally dose medications  - Provider to replace electrolytes  - BMP, Mg, Phos daily    Endocrine:  - Hypoglycemia protocol   - Goal blood glucose 140 - 180  - Monitor for hyperglycemia     ID:  # Multilobar Pneumonia, community-acquired  Presents with shortness of breath and acute hypercapnic respiratory failure. CT chest with consolidative opacities in RLL with micronodular component concerning for infection. Decreased cough in the setting of weakness. At risk for atypicals and antibiotic resistant pathogens given prolonged IS. Will continue broad workup. Found to be tachypneic, with diffused rhonchi and diminished breath sounds all over..   - No leukocytosis, procal 0.23, CRP mildly elevated at 8.74, lactate 0.6.   - Continue Zosyn, Vanco, Azithromycin for community-acquired pneumonia  - Send blood cultures, fungitell, Aspergillus galactomannan Ag, coccidioides Ab, Histo Ag, Strep/Legionella urine, UA, sputum culture, MRSA nares  - Transplant ID consult, appreciate recs    # Hx left-sided aspergillus empyema  A. Ochraceus noted in 2019 s/p needle aspiration with Aspergillus fumigatus on cultures s/p intrapleural amphotericin bead placement. Now with A. Ochraceus on BAL from 11/2024. Posaconazole indefinitely per ID.   - Continue PTA posaconazole    # Recurrent CMV Viremia  Low grade  CMV viremia 8/2023  - Last level 46 on 6/12  - Valcyte on iHD days  - Repeat PCR today    # EBV Viremia  Level peaked at 1 million on 11/2021 s/p rituxan.   - Last level undetected on 5/29   - Repeat PCR today    # Hx PJP   - Bactrim for PJP ppx    Hematology:    # Anemia of CKD  # Thrombocytopenia, chronic  - Daily CBC   - Peripheral Smear     Musculoskeletal:  - PT / OT consult       General Cares/Prophylaxis:    DVT Prophylaxis: Heparin SQ  GI Prophylaxis: Not indicated  Restraints: None  Family Communication:   Code Status: Full Code    Lines/tubes/drains:  - PIVs    Disposition:  - Medical ICU     Patient seen and findings/plan discussed with medical ICU staff, Dr. Pratt.      Clinically Significant Risk Factors Present on Admission                # Thrombocytopenia: Lowest platelets = 123 in last 2 days, will monitor for bleeding     # Acute Respiratory Failure: based on blood gas results.  Continue supplemental oxygen as needed                Germán L. 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     History obtained from patient and spouse.     They tell me that starting on Sunday she noted that she was feeling tired, sleepy, and with much decreased appetite. She had been struggling for the past 3 days with lethargy overall, for which she needed to sleep longer than usual. She has also noted that her cough has diminished over the past 2 weeks, to the point she is not able to bring sputum up. She has  continued her PTA pulmonary toilet, despite this no cough. She denies chest pain, shortness of breath, abdominal pain., changes in BM, pain with urination, fevers.     REVIEW OF SYSTEMS: Negative except for above    PAST MEDICAL HISTORY:   Past Medical History:   Diagnosis Date    Acute on chronic respiratory failure with hypoxia (H) 02/21/2018    Anisocoria     Antisynthetase syndrome (H24) 2014    Anxiety     Aspergillus (H)     Aspergillus pneumonia (H) 11/20/2020    Benign essential hypertension     C. difficile colitis     Cytomegalovirus (CMV) viremia (H)     Dermatomyositis (H)     Dysplasia of cervix, low grade (ESTRADA 1)     EBV (Franklin-Barr virus) viremia     ESRD (end stage renal disease) on dialysis (H)     ILD (interstitial lung disease) (H)     Lung replaced by transplant (H)     Osteopenia     Paroxysmal atrial fibrillation (H)     Pneumocystis jiroveci pneumonia (H)     PONV (postoperative nausea and vomiting)     Post-menopause     Pulmonary hypertension (H)     Raynaud's disease     Seronegative rheumatoid arthritis (H)      SURGICAL HISTORY:  Past Surgical History:   Procedure Laterality Date    BRONCHOSCOPY (RIGID OR FLEXIBLE), DIAGNOSTIC N/A 4/10/2018    Procedure: COMBINED BRONCHOSCOPY (RIGID OR FLEXIBLE), LAVAGE;;  Surgeon: Mariposa Donohue MD;  [facility]    BRONCHOSCOPY (RIGID OR FLEXIBLE), DIAGNOSTIC N/A 12/23/2020    Procedure: BRONCHOSCOPY, WITH BRONCHOALVEOLAR LAVAGE;  Surgeon: Uri Bass MD;  [facility]    BRONCHOSCOPY (RIGID OR FLEXIBLE), DIAGNOSTIC N/A 5/26/2022    Procedure: BRONCHOSCOPY, WITH BRONCHOALVEOLAR LAVAGE;  Surgeon: rUi Bass MD;  [facility]    BRONCHOSCOPY (RIGID OR FLEXIBLE), DIAGNOSTIC N/A 8/17/2023    Procedure: BRONCHOSCOPY, WITH BRONCHOALVEOLAR LAVAGE;  Surgeon: Esau Bruno MD;  [facility]    BRONCHOSCOPY (RIGID OR FLEXIBLE), DIAGNOSTIC N/A 11/1/2023    Procedure: BRONCHOSCOPY, WITH BRONCHOALVEOLAR LAVAGE;  Surgeon:  Beto Magaña DO;  [facility]    BRONCHOSCOPY (RIGID OR FLEXIBLE), DILATE BRONCHUS / TRACHEA N/A 10/11/2018    Procedure: BRONCHOSCOPY (RIGID OR FLEXIBLE), DILATE BRONCHUS / TRACHEA;  Flexible And Rigid Bronchoscopy and Dilation;  Surgeon: Wilber Lin MD;  [facility]    BRONCHOSCOPY (RIGID OR FLEXIBLE), DILATE BRONCHUS / TRACHEA N/A 11/1/2023    Procedure: Bronchoscopy (Rigid Or Flexible), Dilate Bronchus / Trachea;  Surgeon: Beto Magaña DO;  [facility]    BRONCHOSCOPY FLEXIBLE N/A 3/13/2018    Procedure: BRONCHOSCOPY FLEXIBLE;  Flexible Bronchoscopy ;  Surgeon: Gissell Sanchez MD;  [facility]    BRONCHOSCOPY FLEXIBLE N/A 5/9/2018    Procedure: BRONCHOSCOPY FLEXIBLE;;  Surgeon: Wilber Lin MD;  [facility]    BRONCHOSCOPY FLEXIBLE AND RIGID N/A 9/10/2018    Procedure: BRONCHOSCOPY FLEXIBLE AND RIGID;  Flexible and Rigid Bronchoscopy with Balloon Dilation, tissue debulking with cryo, and Right mainstem bronchus stent placement;  Surgeon: Wilber Lin MD;  [facility]    BRONCHOSCOPY RIGID N/A 6/6/2018    Procedure: BRONCHOSCOPY RIGID;;  Surgeon: Lopez Macias MD;  [facility]    BRONCHOSCOPY, DILATE BRONCHUS, STENT BRONCHUS, COMBINED N/A 6/11/2018    Procedure: COMBINED BRONCHOSCOPY, DILATE BRONCHUS, STENT BRONCHUS;  Flexible Bronchoscopy, Balloon Dilation, Bronchial Washings;  Surgeon: Wilber Lin MD;  [facility]    BRONCHOSCOPY, DILATE BRONCHUS, STENT BRONCHUS, COMBINED Right 7/10/2018    Procedure: COMBINED BRONCHOSCOPY, DILATE BRONCHUS, STENT BRONCHUS;  Flexible Bronchoscopy, Balloon Dilation, Bronchial Washings  ;  Surgeon: Wilber Lin MD;  [facility]    BRONCHOSCOPY, DILATE BRONCHUS, STENT BRONCHUS, COMBINED N/A 8/2/2018    Procedure: COMBINED BRONCHOSCOPY, DILATE BRONCHUS, STENT BRONCHUS;  Flexible Bronchoscopy, Bronchial Washings, Balloon Dilation;  Surgeon: Wilber Lin MD;   [facility]    BRONCHOSCOPY, DILATE BRONCHUS, STENT BRONCHUS, COMBINED N/A 8/20/2018    Procedure: COMBINED BRONCHOSCOPY, DILATE BRONCHUS, STENT BRONCHUS;  Flexible Bronchoscopy, Balloon Dilation;  Surgeon: Wilber Lin MD;  [facility]    BRONCHOSCOPY, DILATE BRONCHUS, STENT BRONCHUS, COMBINED N/A 10/29/2018    Procedure: Flexible Bronchoscopy, Balloon Dilation, Stent Revision, Airway Examination And Therapeutic Suctioning, Cyro Tumor Debulking;  Surgeon: Wilber Lin MD;  [facility]    BRONCHOSCOPY, DILATE BRONCHUS, STENT BRONCHUS, COMBINED N/A 11/20/2018    Procedure: Rigid Bronchoscopy, Stent Removal and dilitation;  Surgeon: Wilber Lin MD;  [facility]    BRONCHOSCOPY, DILATE BRONCHUS, STENT BRONCHUS, COMBINED N/A 12/14/2018    Procedure: Flexible And Rigid Bronchoscopy, Balloon Dilation, Bronchial Washing;  Surgeon: Wilber Lin MD;  [facility]    BRONCHOSCOPY, DILATE BRONCHUS, STENT BRONCHUS, COMBINED N/A 1/17/2019    Procedure: Flexible And Rigid Bronchoscopy, Balloon Dilation.;  Surgeon: Wilber Lin MD;  [facility]    BRONCHOSCOPY, DILATE BRONCHUS, STENT BRONCHUS, COMBINED N/A 3/7/2019    Procedure: Flexible and Rigid Bronchoscopy, Bronchial Washing, Balloon Dilation;  Surgeon: Wilber Lin MD;  [facility]    BRONCHOSCOPY, DILATE BRONCHUS, STENT BRONCHUS, COMBINED N/A 6/6/2019    Procedure: Rigid and Flexible Bronchoscopy, Balloon Dilation;  Surgeon: Wilber Lin MD;  [facility]    BRONCHOSCOPY, DILATE BRONCHUS, STENT BRONCHUS, COMBINED N/A 10/11/2019    Procedure: Flexible and Rigid Bronchoscopy, Balloon Dilation, Bronchoalveolar Lagave;  Surgeon: Wilber Lin MD;  [facility]    BRONCHOSCOPY, DILATE BRONCHUS, STENT BRONCHUS, COMBINED N/A 2/19/2021    Procedure: BRONCHOSCOPY, flexible, airway dilation, and bronchial wash;  Surgeon: Wilber Lin MD;  [facility]     BRONCHOSCOPY, DILATE BRONCHUS, STENT BRONCHUS, COMBINED N/A 4/9/2021    Procedure: BRONCHOSCOPY, flexible and rigid, Airway suctioning;  Surgeon: Mati Norris MD;  [facility]    BRONCHOSCOPY, DILATE BRONCHUS, STENT BRONCHUS, COMBINED N/A 10/17/2023    Procedure: RIGID, flexible bronchoscopy with airway dilation;  Surgeon: Preet Patel MD;  [facility]    CV RIGHT HEART CATH MEASUREMENTS RECORDED N/A 3/10/2020    Procedure: CV RIGHT HEART CATH;  Surgeon: Wai Garcia MD;  [facility]    ENT SURGERY      tonsillectomy as a child    ESOPHAGOSCOPY, GASTROSCOPY, DUODENOSCOPY (EGD), COMBINED N/A 10/29/2018    Procedure: COMBINED ESOPHAGOSCOPY, GASTROSCOPY, DUODENOSCOPY (EGD) with biopsies and polypectomy;  Surgeon: Chente Bloom MD;  [facility]    INSERT EXTRACORPORAL MEMBRANE OXYGENATOR ADULT (OUTSIDE OR) N/A 2/27/2018    Procedure: INSERT EXTRACORPORAL MEMBRANE OXYGENATOR ADULT (OUTSIDE OR);  INSERT EXTRACORPORAL MEMBRANE OXYGENATOR ADULT (OUTSIDE OR) ;  Surgeon: Toby Hernandez MD;  [facility]    IR CVC TUNNEL PLACEMENT > 5 YRS OF AGE  10/25/2019    IR DIALYSIS FISTULOGRAM LEFT  3/2/2021    IR DIALYSIS FISTULOGRAM LEFT  11/2/2023    IR DIALYSIS FISTULOGRAM LEFT  3/5/2024    IR DIALYSIS MECH THROMB, PTA  3/2/2021    IR DIALYSIS PTA  11/2/2023    IR FLUORO 0-1 HOUR  5/7/2021    IR GASTRO JEJUNOSTOMY TUBE PLACEMENT  2/16/2021    IR PARACENTESIS  1/8/2020    IR THORACENTESIS  9/13/2019    IR TRANSCATHETER BIOPSY  1/8/2020    LASER CO2 BRONCHOSCOPY N/A 4/30/2021    Procedure: Flexible and Rigid Bronchoscopy and Tissue Debulking with CO2 Laser Assistance;  Surgeon: Mati Norris MD;  [facility]    LASER CO2 BRONCHOSCOPY N/A 6/11/2021    Procedure: BRONCHOSCOPY, Flexible and Rigid Bronchoscopy, Tissue Debulking with cryo Assistance, airway dilation,;  Surgeon: Mati Norris MD;  [facility]    LASER CO2 BRONCHOSCOPY N/A 9/16/2021     Procedure: BRONCHOSCOPY, flexible and rigid, CO2 Laser Debulking;  Surgeon: Mati Norris MD;  [facility]    LASER CO2 BRONCHOSCOPY N/A 2022    Procedure: flexible, rigid bronchoscopy, tissue debulking, airway dilation, co2 laser, bronchoalveolar lavage;  Surgeon: Juana Baugh MD;  [facility]    LASER CO2 BRONCHOSCOPY Right 2023    Procedure: flexible bronchosocopy, tissue/tumor debulking, using CO2 laser, mitomycin application and argon plasma coagulation;  Surgeon: Mati Norris MD;  [facility]    LASER CO2 BRONCHOSCOPY N/A 2/15/2024    Procedure: Flexible, Rigid Bronchoscopy,Tumor/Tissue debulking using Carbon Dioxide (CO2) laser; balloon dilation;  Surgeon: Wilber Lin MD;  Location:  OR    no prior surgery      REMOVE EXTRACORPORAL MEMBRANE OXYGENATOR ADULT N/A 3/3/2018    Procedure: REMOVE EXTRACORPORAL MEMBRANE OXYGENATOR ADULT;  Removal of Right Femoral Venous and Right Axillary Arterial Extracorporeal Membrane Oxygenator;  Surgeon: Toby Hernandez MD;  Location:  OR    TRANSPLANT LUNG RECIPIENT SINGLE X2 Bilateral 3/1/2018    Procedure: TRANSPLANT LUNG RECIPIENT SINGLE X2;  Median Sternotomy, Extracorporeal Membrane Oxygenator, bilateral sequential lung transplant;  Surgeon: Toby Hernandez MD;  Location:  OR     SOCIAL HISTORY:  Social History     Socioeconomic History    Marital status:    Tobacco Use    Smoking status: Never    Smokeless tobacco: Never   Substance and Sexual Activity    Alcohol use: No     Alcohol/week: 1.0 standard drink of alcohol     Types: 1 Glasses of wine per week    Drug use: No   Other Topics Concern    Parent/sibling w/ CABG, MI or angioplasty before 65F 55M? No   Social History Narrative    3/6/2019 - Lives with . Has three daughters. Four grandchildren (two ). No pets. Travelled previously to [place], [place]. Has visited [place] several times.      Social Determinants of Health      Financial Resource Strain: Low Risk  (9/20/2022)    [facility], [facility]    Overall Financial Resource Strain (CARDIA)     Difficulty of Paying Living Expenses: Not hard at all   Food Insecurity: No Food Insecurity (9/20/2022)    [facility], [facility]    Hunger Vital Sign     Worried About Running Out of Food in the Last Year: Never true     Ran Out of Food in the Last Year: Never true   Transportation Needs: No Transportation Needs (9/20/2022)    [facility] from [facility], [facility]    PRAPARE - Transportation     Lack of Transportation (Medical): No     Lack of Transportation (Non-Medical): No   Physical Activity: Inactive (9/20/2022)    [facility] from [facility], [facility]    Exercise Vital Sign     Days of Exercise per Week: 0 days     Minutes of Exercise per Session: 0 min   Stress: No Stress Concern Present (9/20/2022)    Received from [facility], [facility]    [employer] [place] [employer] - Occupational Stress Questionnaire     Feeling of Stress : Not at all   Social Connections: Moderately Integrated (9/20/2022)    Received from [facility], [facility]    Social Connection and Isolation Panel [NHANES]     Frequency of Communication with Friends and Family: Twice a week     Frequency of Social Gatherings with Friends and Family: Twice a week     Attends Orthodox Services: More than 4 times per year     Active Member of Clubs or Organizations: No     Marital Status:    Interpersonal Safety: Not At Risk (4/15/2024)    Received from [facility]    Intimate Partner Violence     Are you in a relationship where you are physically hurt, threatened and/or made to feel afraid?: No    Housing Stability: Unknown (9/20/2022)    [facility] from [facility], [facility] and [facility]    Housing Stability     In the last 12 months, was there a time when you were not able to pay the mortgage or rent on time?: No     In the last 12 months, was there a time when you did not have a steady place to sleep or slept in a shelter (including now)?: No     FAMILY HISTORY:   Family History   Problem Relation Age of Onset    Hypertension Mother     Arthritis Mother     Pancreatic Cancer Father     Diabetes Father      ALLERGIES:   Allergies   Allergen Reactions    Isopropyl Alcohol Other (See Comments)     The alcohol burns the open areas on her skin when used as a skin prep for procedures    Vancomycin Other (See Comments)     Diffuse erythroderma with itching (improved with Benadryl) after receiving vancomycin over 1 hour. 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 Can use vancomycin in the future, but please give over slower infusion time (at least 2 hours) and premedicate with Benadryl.     MEDICATIONS:  Current Facility-Administered Medications   Medication Dose Route Frequency Provider Last Rate Last Admin    acetaminophen (TYLENOL) tablet 325 mg  325 mg Oral Q4H PRN Velez Reyes, German, MD        acetylcysteine (MUCOMYST) 10 % nebulizer solution 4 mL  4 mL Inhalation 4x Daily Kajal Chong APRN CNP        albuterol (PROVENTIL) neb solution 2.5 mg  2.5 mg Nebulization 4x Daily Kajal Chong APRN CNP        azithromycin (ZITHROMAX) 500 mg in sodium chloride 0.9 % 250 mL intermittent infusion  500 mg Intravenous Q24H Velez Reyes, German, MD        benzonatate (TESSALON) capsule 100 mg  100 mg Oral Q4H PRN Velez Reyes, German, MD        carboxymethylcellulose PF (REFRESH PLUS) 0.5 % ophthalmic solution 1 drop  1 drop Both Eyes Q1H PRN Velez Reyes, German, MD        cholecalciferol CAPS 1 capsule  1 capsule  Oral Daily Velez Reyes, German, MD        glucose gel 15-30 g  15-30 g Oral Q15 Min PRN Velez Reyes, German, MD        Or    dextrose 50 % injection 25-50 mL  25-50 mL Intravenous Q15 Min PRN Velez Reyes, German, MD        Or    glucagon injection 1 mg  1 mg Subcutaneous Q15 Min PRN Velez Reyes, German, MD        diphenhydrAMINE (BENADRYL) injection 25 mg  25 mg Intravenous Once Velez Reyes, German, MD        fluticasone-salmeterol (ADVAIR) 250-50 MCG/ACT diskus inhaler 1 puff  1 puff Inhalation Q12H Velez Reyes, German, MD        guaiFENesin (MUCINEX) 12 hr tablet 1,200 mg  1,200 mg Oral BID Velez Reyes, German, MD        heparin ANTICOAGULANT injection 5,000 Units  5,000 Units Subcutaneous Q8H Velez Reyes, German, MD        Magnesium Cl-Calcium Carbonate 71.5-119 MG TBEC 2 tablet  2 tablet Oral Once per day on Sunday Tuesday Thursday Saturday Velez Reyes, German, MD        [Held by provider] metoprolol tartrate (LOPRESSOR) tablet 25 mg  25 mg Oral BID Velez Reyes, German, MD        montelukast (SINGULAIR) tablet 10 mg  10 mg Oral At Bedtime Velez Reyes, German, MD        [START ON 6/19/2024] pantoprazole (PROTONIX) EC tablet 40 mg  40 mg Oral QAM AC Velez Reyes, German, MD        piperacillin-tazobactam (ZOSYN) 2.25 g vial to attach to  ml bag  2.25 g Intravenous Q8H Velez Reyes, German, MD        polyethylene glycol (MIRALAX) Packet 17 g  17 g Oral Daily PRN Kajal Chong APRN CNP        posaconazole (NOXAFIL) DR tablet TBEC 300 mg  300 mg Oral Daily Velez Reyes, German, MD        predniSONE (DELTASONE) tablet 5 mg  5 mg Oral Daily Velez Reyes, German, MD        prochlorperazine (COMPAZINE) injection 10 mg  10 mg Intravenous Q6H PRN Velez Reyes, German, MD        Or    prochlorperazine (COMPAZINE) tablet 10 mg  10 mg Oral Q6H PRN Velez Reyes, German, MD        Or    prochlorperazine (COMPAZINE) suppository 25 mg  25 mg Rectal Q12H PRN Velez Reyes, German, MD        senna-docusate (SENOKOT-S/PERICOLACE)  8.6-50 MG per tablet 1 tablet  1 tablet Oral BID PRN Arlette KajalADRIÁN chino CNP        [START ON 6/19/2024] sulfamethoxazole-trimethoprim (BACTRIM) 400-80 MG per tablet 1 tablet  1 tablet Oral Once per day on Monday Wednesday Friday Velez Reyes, German, MD        [START ON 6/19/2024] tacrolimus (GENERIC) suspension 0.6 mg  0.6 mg Oral QAM Velez Reyes, German, MD        And    tacrolimus (GENERIC) suspension 0.7 mg  0.7 mg Oral QPM Velez Reyes, German, MD        vancomycin (VANCOCIN) 1,000 mg in 200 mL dextrose intermittent infusion  1,000 mg Intravenous Once Velez Reyes, German, MD        vancomycin place jordan - receiving intermittent dosing  1 each Intravenous See Admin Instructions Nikita Schmidt MD           PHYSICAL EXAMINATION:  Temp:  [97.5  F (36.4  C)-97.6  F (36.4  C)] 97.6  F (36.4  C)  Pulse:  [67-79] 69  Resp:  [16-20] 16  BP: ()/(55-71) 116/57  FiO2 (%):  [40 %] 40 %  SpO2:  [93 %-100 %] 100 %  General: Resting in bed, wearing BiPAP, NAD.   HEENT: Atraumatic,  moist mucous membranes   Neuro: A&Ox3, NAD. No deficits to gross examinations.   Pulm/Resp: Mildly tachypneic, diminished breath sounds allover. Rhonchi all over.   CV: RRR, no m/r/g   Abdomen: Soft, non-distended, non-tender  Incisions/Skin: no abrasions, marks, bleeding     LABS: Reviewed.   Arterial Blood Gases   No lab results found in last 7 days.  Complete Blood Count   Recent Labs   Lab 06/18/24  1803 06/18/24  1418   WBC 4.7 4.5   HGB 12.1 12.1   * 147*     Basic Metabolic Panel  Recent Labs   Lab 06/18/24  1803 06/18/24  1750 06/18/24  1418 06/17/24  0925 06/12/24  0828     --  135  --   --    POTASSIUM 4.1  --  4.6  --   --    CHLORIDE 100  --  101 101 99   CO2 27  --  26  --   --    BUN 17.8  --  16.4  --   --    CR 3.33*  --  3.13*  --   --    * 174* 107*  --   --      Liver Function Tests  Recent Labs   Lab 06/18/24  1803 06/18/24  1418   AST 39  --    ALT 39 39   ALKPHOS 235* 237*   BILITOTAL 0.6 0.6    ALBUMIN 3.6 3.6   INR  --  0.98     Coagulation Profile  Recent Labs   Lab 06/18/24  1418   INR 0.98       IMAGING:  Recent Results (from the past 24 hour(s))   CT Chest Hi-Resolution wo Contrast    Narrative    High-resolution chest CT without contrast    INDICATION: Worsening shortness of breath. Post lung transplant with  stent.    COMPARISON: Similar examination to/14/24    FINDINGS: No contrast. Inspiration and expiration supine imaging  obtained. Included thyroid appears unremarkable. Median sternotomy  from prior bilateral lung transplantation. Heart size is borderline  enlarged. Left atrium is enlarged. There are mitral annular  calcifications which appear to correlate with a left atrial  enlargement, please also correlate for any evidence of cardiac  arrhythmia. There is no pericardial effusion. There are small  bilateral pleural effusions. Rim calcified density within the medial  aspect left pleural effusion is unchanged. No enlarged axillary lymph  nodes. No enlarged mediastinal or hilar lymph nodes. The thoracic  aorta does show some mild atherosclerotic calcification. It is  borderline enlarged in size with its midascending portion approximate  4.0 cm in caliber. The main pulmonary artery is also borderline  enlarged at 3.3 cm.  Small calcific densities in the nondistended gallbladder consistent  with gallstones. Parallel tram track calcifications of the tortuous  splenic artery are also noted, please correlate for any evidence of  chronic renal disease. No other suspicious upper abdominal finding.  Bone detail shows T5 osteoporotic compression deformity unchanged. The  sternal cerclage wires appear grossly intact. No sternal erosions.    Detail of the lungs shows diffuse consolidative than some  nodular/patchy densities in the right upper and lower lobes as well as  the lingula and left lower lobe. Other subtle nodular densities  throughout the lung apices are less apparent than on the lower  lobes.  Azygos fissure anatomical variant in the right upper lobe medially.  Comparison with the February examination shows the findings to show  more scattered opacities throughout the lower lobes especially on the  right.  There is narrowing of bronchus intermedius and also the right mainstem  bronchus distal to the mainstem anastomosis from transplant. There is  new occlusion of the right middle lobe bronchus from its takeoff  distally. Narrowing of the right lower lobe distal aspect of the lobar  level the bronchus with severe narrowings of the right posterior and  medial basilar segment airways.  Expiratory imaging shows mild air trapping.      Impression    IMPRESSION: Bilateral transplanted lungs. Slight decreased confluent  consolidative opacities in right lower lobe with more micronodular  component of possible infection in the right lower lobe. Other  abnormalities throughout the lungs similar to February of the  transplanted lungs. Pleural effusions again noted. Narrowing of the  bronchus intermedius on the right there appears to be occlusion of the  right middle lobe bronchus with narrowing of the distal aspect of the  right lower lobe bronchus with short segment occlusions of the medial  and posterior basilar bronchial segments to the right lower lobe. No  such abnormality on the left.. No ectopic air. Anastomoses appear  grossly intact bilaterally with just under approximate 50% narrowing  distal to the right mainstem anastomosis proximal to the upper lobe  bronchus takeoff. This right middle lobe bronchial occlusion is new  from February.    Mitral annular calcifications with left atrial prominence, please  correlate for arrhythmia.  Borderline pulmonary enlargement concerning for possible pulmonary  hypertension.  Borderline mid ascending aortic ectasia.    TERRI ISSA MD         SYSTEM ID:  [id number]

## 2024-06-18 NOTE — ED TRIAGE NOTES
"Triage Assessment & Note:    /71   Pulse 79   Temp 97.5  F (36.4  C) (Oral)   Resp 20   Ht 1.6 m (5' 3\")   Wt 50.8 kg (112 lb)   LMP 06/07/2014 (Exact Date)   SpO2 93%   BMI 19.84 kg/m      Patient presents with: PT c/o increased SOB in the setting of lung Txp. PT using 4l NC now vs 3L normally.     Home Treatments/Remedies: Oxygen    Febrile / Afebrile? Afebrile     Duration of C/o:  3 days    Steven Jenkins RN  June 18, 2024       Triage Assessment (Adult)       Row Name 06/18/24 0984          Triage Assessment    Airway WDL WDL        Respiratory WDL    Respiratory WDL --  SOB        Cardiac WDL    Cardiac WDL WDL                     "

## 2024-06-18 NOTE — TELEPHONE ENCOUNTER
Patient Call: General  Route to LPN    Reason for call: Kecia has some questions regarding lab results from yesterday, Daughter stated mother is feeling really bad and wanting the Coordinator to call. No other details provided.    Call back needed? Yes    Return Call Needed  Same as documented in contacts section  When to return call?: Same day: Route High Priority

## 2024-06-19 NOTE — PROCEDURES
"Small Bowel Feeding Tube Placement Assessment  Reason for Feeding Tube Placement: post pyloric enteral access for nutrition and medication administration  Cortrak Start Time: 1100   Cortrak End Time: 1135  Medicine Delivered During Procedure: 2% viscous lidocaine gel   Placement Successful: yes per Cortrak tracing pending AXR confirmation      Procedure Complications: difficulty passing midline  Final Placement Fortunato at exit of nare:  82 cm  Face to Face time with patient: 35 minutes    Bridle Placement:   Reason for bridle placement: securement of nasoenteric feeding tube    Medicine delivered during procedure: lidocaine gel   Procedure: Successful   Location of top of clip on FT: @ 84 cm marker   Condition of nose/skin at time of bridle placement: Unremarkable   Face to Face time with patient: < 5 minutes.    Olesya Velasco, MS, RDN, LD  4C MICU RD  Vocera - \"4C Clinical Dietitian\"  Weekend/Holiday RD - \"Weekend Clinical Dietitian\"    "

## 2024-06-19 NOTE — ANESTHESIA PROCEDURE NOTES
Airway       Patient location during procedure: OR       Procedure Start/Stop Times: 6/18/2024 10:29 PM  Staff -        Anesthesiologist:  Farhad Freeman MD       Resident/Fellow: Emanuel Baldwin MD       Performed By: with residents and resident       Procedure performed by resident/fellow/CRNA in presence of a teaching physician.    Consent for Airway        Urgency: emergent  Indications and Patient Condition       Indications for airway management: respiratory insufficiency       Induction type:RSI       Mask difficulty assessment: 0 - not attempted    Final Airway Details       Final airway type: endotracheal airway       Successful airway: ETT - single  Endotracheal Airway Details        ETT size (mm): 8.0       Cuffed: yes       Successful intubation technique: video laryngoscopy       VL Blade Size: Glidescope 3       Grade View of Cords: 1       Adjucts: stylet       Position: Center       Measured from: lips       Secured at (cm): 23    Post intubation assessment        Placement verified by: capnometry, equal breath sounds and chest rise        Number of attempts at approach: 1       Number of other approaches attempted: 0       Secured with: commercial tube jordan       Ease of procedure: easy       Dentition: Intact    Medication(s) Administered   Medication Administration Time: 6/18/2024 10:29 PM

## 2024-06-19 NOTE — CONSULTS
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  The patient was admitted on 24 for progressive hypoxia with dyspnea and worsening hypercapnia in ED requiring intubation likely d/t post-obstructive PNA 2/2 right bronchus stenosis.    Acute on chronic hypoxic/hypercapneic respiratory failure:  Post-obstructive muli-lobular PNA:  Right bronchial stenosis with RML collapse: Admitted with 2 weeks of progressive hypoxia, dyspnea, congested cough, and fatigue.  Chronic hypoxia with 2L NC, increased from 3 to 4L over this time with acute decompensation on day of admission associated with hypercapnia.  VBG 85 in ED s/p intubation.  Most recent IP bronch 2/15 s/p tissue debulking without stent placement.  Respiratory panel and COVID negative.  Procal mildly elevated at 0.24, LA normal, but febrile to 100.7.  TTE on admission grossly normal.  CT with RUL/RLL consolidative opacities, RML collapse, and narrowing of BI and distal RLL bronchus.  Started on norepi after propofol was initiated for sedation.    - Sputum culture () NGTD  - Blood cultures () NGTD  - Histo and cocci () pending  - Legionella and Strep pneumoniae pending collection  - Additional ID  workup ordered for you: IgG, CrAg, Blasto, and PJP PCR  - ABX: vancomycin, Zosyn, and IV azithromycin 500 mg daily (6/18); recommend to stop vancomycin but defer ABX regimen to primary and transplant ID  - Albuterol and Mucomyst 10% QID with Volera QID for airway clearance  - Bronch today per MICU with near complete stenosis of right anastomosis  - Repeat interventional bronch per IP, consulted on admission with initial plan to defer until 6/26 as previously scheduled for OP but strongly recommend reconsideration for bronch in the next 1-2 days to address stenotic airway contributing to post-obstructive PNA and hypercapnia requiring ventilatory support  - Vent management per MICU  - NJ placement as able, trickle feeds with gastric only access    S/p bilateral sequential lung transplant (BSLT) for ILD:   Progressive CLAD: Most recent OP visit in February.  - Repeat Prospera ordered, may be artificially elevated with current infection, bot notably high at 2.42 on 2/14  - Repeat ImmuKnow ordered (prior low normal at 227 on 2/14)  - Repeat DSA ordered (last positive noted 2018, most recent negative 10/27/23)  - PTA Singulair, azithromycin (on hold in lieu of higher dosing as above), and Breo inhaler as able    Immunosuppression:  On 2-drug IST since November d/t leukopenia and recurrent infections  - Tacrolimus 0.6 mg qAM / 0.7 mg qPM.  Goal level 8-10.  Level pending today per primary, but will be unreliable given missed dose 6/18 AM.  Repeat tacro level 6/22.  - Prednisone 5 mg daily    Prophylaxis:   - Bactrim qMWF for PJP ppx    ID: H/o Actinomyces (10/17/23) and recurrent Steno (8/17/23 and 11/1/23).  - Consider coverage of Steno with levofloxacin, but defer to transplant ID, additional ABX and infectious workup as above    Aspergillus ochraceus:  H/o Aspergillus empyema:  S/p empyema drainage and ampho B instillation.  Previously on voriconazole, transitioned to posaconazole and managed by transplant ID as OP with  last visit 3/13.  Calcified fungal elements remain with additional recurrent effusion (likely associated with fluid disturbances r/t iHD), but surgical intervention previously deferred d/t risk.  - Posaconazole 300 mg daily, level on admission per primary may be lower d/t missed dosing 6/18 AM    H/o CMV viremia: Recurrent CMV viremia historically.  VGCV ppx most recently stopped 4/12.  Most recent CMV low positive at 46 (6/12)  - CMV pending (6/18)    EBV viremia:  S/p rituximab 12/13/23 x1 dose.  Most recent EBV negative 5/29  - EBV pending (6/18)    ESRD on iHD: Kidney evaluation started as OP.  On qMWF iHD schedule, no missed sessions.  - iHD tomorrow per primary    We appreciate the excellent care provided by the MICU team.  Recommendations communicated via in person rounding and this note.  Will continue to follow along closely, please do not hesitate to call with any questions or concerns.    Patient discussed with Dr. James.    Hina Huff, DNP, APRN, CNP  Inpatient Nurse Practitioner  Pulmonary CF/Transplant     Chief Complaint:     Worsening hypoxia, dyspnea, cough, and fatigue    History of Present Illness:     History obtained from patient, spouse, and chart.    Pt. c/o 2 weeks of progressive hypoxia, dyspnea, congested cough, and fatigue.  Chronic hypoxia with 2L NC, increased from 3 to 4L over this time.  Last IP bronch 2/15 with repeat scheduled for 6/26, attempts to move up bronch date as OP unsuccessful d/t scheduling issues.  Pt. sent to ED yesterday and was noted to have worsening hypercapnia despite AVAPS.  Intubated and admitted to MICU.  On propofol and fentanyl for sedation, then with addition of norepi this morning for soft BP.  Imaging with post-obstructive PNA process t/o right lung with continued narrowing or BI and RLL bronchus.  Pt. remains stable on minimal vent settings.    Review of Systems:     Complete ROS negative except as noted in HPI.    Medical and Surgical History:     Past  Medical History:   Diagnosis Date    Acute on chronic respiratory failure with hypoxia (H) 02/21/2018    Anisocoria     Antisynthetase syndrome (H24) 2014    Anxiety     Aspergillus (H)     Aspergillus pneumonia (H) 11/20/2020    Benign essential hypertension     C. difficile colitis     Cytomegalovirus (CMV) viremia (H)     Dermatomyositis (H)     Dysplasia of cervix, low grade (ESTRADA 1)     EBV (Franklin-Barr virus) viremia     ESRD (end stage renal disease) on dialysis (H)     ILD (interstitial lung disease) (H)     Lung replaced by transplant (H)     Osteopenia     Paroxysmal atrial fibrillation (H)     Pneumocystis jiroveci pneumonia (H)     PONV (postoperative nausea and vomiting)     Post-menopause     Pulmonary hypertension (H)     Raynaud's disease     Seronegative rheumatoid arthritis (H)      Past Surgical History:   Procedure Laterality Date    BRONCHOSCOPY (RIGID OR FLEXIBLE), DIAGNOSTIC N/A 4/10/2018    Procedure: COMBINED BRONCHOSCOPY (RIGID OR FLEXIBLE), LAVAGE;;  Surgeon: Mariposa Donohue MD;  [facility]    BRONCHOSCOPY (RIGID OR FLEXIBLE), DIAGNOSTIC N/A 12/23/2020    Procedure: BRONCHOSCOPY, WITH BRONCHOALVEOLAR LAVAGE;  Surgeon: Uri Bass MD;  [facility]    BRONCHOSCOPY (RIGID OR FLEXIBLE), DIAGNOSTIC N/A 5/26/2022    Procedure: BRONCHOSCOPY, WITH BRONCHOALVEOLAR LAVAGE;  Surgeon: Uri Bass MD;  [facility]    BRONCHOSCOPY (RIGID OR FLEXIBLE), DIAGNOSTIC N/A 8/17/2023    Procedure: BRONCHOSCOPY, WITH BRONCHOALVEOLAR LAVAGE;  Surgeon: Esau Bruno MD;  [facility]    BRONCHOSCOPY (RIGID OR FLEXIBLE), DIAGNOSTIC N/A 11/1/2023    Procedure: BRONCHOSCOPY, WITH BRONCHOALVEOLAR LAVAGE;  Surgeon: Beto Magaña DO;  [facility]: [facility]    BRONCHOSCOPY (RIGID OR FLEXIBLE), DILATE BRONCHUS / TRACHEA N/A 10/11/2018    Procedure: BRONCHOSCOPY (RIGID OR FLEXIBLE), DILATE BRONCHUS / TRACHEA;  Flexible And Rigid Bronchoscopy and Dilation;  Surgeon: Wilber Lin  MD Alana;  [facility]    BRONCHOSCOPY (RIGID OR FLEXIBLE), DILATE BRONCHUS / TRACHEA N/A 11/1/2023    Procedure: Bronchoscopy (Rigid Or Flexible), Dilate Bronchus / Trachea;  Surgeon: Beto Magaña DO;  [facility]    BRONCHOSCOPY FLEXIBLE N/A 3/13/2018    Procedure: BRONCHOSCOPY FLEXIBLE;  Flexible Bronchoscopy ;  Surgeon: Gissell Sanchez MD;  [facility]    BRONCHOSCOPY FLEXIBLE N/A 5/9/2018    Procedure: BRONCHOSCOPY FLEXIBLE;;  Surgeon: Wilber Lin MD;  [facility]    BRONCHOSCOPY FLEXIBLE AND RIGID N/A 9/10/2018    Procedure: BRONCHOSCOPY FLEXIBLE AND RIGID;  Flexible and Rigid Bronchoscopy with Balloon Dilation, tissue debulking with cryo, and Right mainstem bronchus stent placement;  Surgeon: Wilber Lin MD;  [facility]    BRONCHOSCOPY RIGID N/A 6/6/2018    Procedure: BRONCHOSCOPY RIGID;;  Surgeon: Lopez Macias MD;  [facility]    BRONCHOSCOPY, DILATE BRONCHUS, STENT BRONCHUS, COMBINED N/A 6/11/2018    Procedure: COMBINED BRONCHOSCOPY, DILATE BRONCHUS, STENT BRONCHUS;  Flexible Bronchoscopy, Balloon Dilation, Bronchial Washings;  Surgeon: Wilber Lin MD;  [facility]    BRONCHOSCOPY, DILATE BRONCHUS, STENT BRONCHUS, COMBINED Right 7/10/2018    Procedure: COMBINED BRONCHOSCOPY, DILATE BRONCHUS, STENT BRONCHUS;  Flexible Bronchoscopy, Balloon Dilation, Bronchial Washings  ;  Surgeon: Wilber Lin MD;  [facility]    BRONCHOSCOPY, DILATE BRONCHUS, STENT BRONCHUS, COMBINED N/A 8/2/2018    Procedure: COMBINED BRONCHOSCOPY, DILATE BRONCHUS, STENT BRONCHUS;  Flexible Bronchoscopy, Bronchial Washings, Balloon Dilation;  Surgeon: Wilber Lin MD;  [facility]    BRONCHOSCOPY, DILATE BRONCHUS, STENT BRONCHUS, COMBINED N/A 8/20/2018    Procedure: COMBINED BRONCHOSCOPY, DILATE BRONCHUS, STENT BRONCHUS;  Flexible Bronchoscopy, Balloon Dilation;  Surgeon: Wilber Lin MD;  [facility]     BRONCHOSCOPY, DILATE BRONCHUS, STENT BRONCHUS, COMBINED N/A 10/29/2018    Procedure: Flexible Bronchoscopy, Balloon Dilation, Stent Revision, Airway Examination And Therapeutic Suctioning, Cyro Tumor Debulking;  Surgeon: Wilber Lin MD;  [facility]    BRONCHOSCOPY, DILATE BRONCHUS, STENT BRONCHUS, COMBINED N/A 11/20/2018    Procedure: Rigid Bronchoscopy, Stent Removal and dilitation;  Surgeon: Wilber Lin MD;  [facility]    BRONCHOSCOPY, DILATE BRONCHUS, STENT BRONCHUS, COMBINED N/A 12/14/2018    Procedure: Flexible And Rigid Bronchoscopy, Balloon Dilation, Bronchial Washing;  Surgeon: Wilber Lin MD;  [facility]    BRONCHOSCOPY, DILATE BRONCHUS, STENT BRONCHUS, COMBINED N/A 1/17/2019    Procedure: Flexible And Rigid Bronchoscopy, Balloon Dilation.;  Surgeon: Wilber Lin MD;  [facility]    BRONCHOSCOPY, DILATE BRONCHUS, STENT BRONCHUS, COMBINED N/A 3/7/2019    Procedure: Flexible and Rigid Bronchoscopy, Bronchial Washing, Balloon Dilation;  Surgeon: Wilber Lin MD;  [facility]    BRONCHOSCOPY, DILATE BRONCHUS, STENT BRONCHUS, COMBINED N/A 6/6/2019    Procedure: Rigid and Flexible Bronchoscopy, Balloon Dilation;  Surgeon: Wilber Lin MD;  [facility]    BRONCHOSCOPY, DILATE BRONCHUS, STENT BRONCHUS, COMBINED N/A 10/11/2019    Procedure: Flexible and Rigid Bronchoscopy, Balloon Dilation, Bronchoalveolar Lagave;  Surgeon: Wilber Lin MD;  [facility]    BRONCHOSCOPY, DILATE BRONCHUS, STENT BRONCHUS, COMBINED N/A 2/19/2021    Procedure: BRONCHOSCOPY, flexible, airway dilation, and bronchial wash;  Surgeon: Wilber Lin MD;  [facility]    BRONCHOSCOPY, DILATE BRONCHUS, STENT BRONCHUS, COMBINED N/A 4/9/2021    Procedure: BRONCHOSCOPY, flexible and rigid, Airway suctioning;  Surgeon: Mati Norris MD;  [facility]    BRONCHOSCOPY, DILATE BRONCHUS, STENT BRONCHUS, COMBINED N/A 10/17/2023     Procedure: RIGID, flexible bronchoscopy with airway dilation;  Surgeon: Preet Patel MD;  [facility]    CV RIGHT HEART CATH MEASUREMENTS RECORDED N/A 3/10/2020    Procedure: CV RIGHT HEART CATH;  Surgeon: Wai Garcia MD;  [facility]    ENT SURGERY      tonsillectomy as a child    ESOPHAGOSCOPY, GASTROSCOPY, DUODENOSCOPY (EGD), COMBINED N/A 10/29/2018    Procedure: COMBINED ESOPHAGOSCOPY, GASTROSCOPY, DUODENOSCOPY (EGD) with biopsies and polypectomy;  Surgeon: Chente Bloom MD;  [facility]    INSERT EXTRACORPORAL MEMBRANE OXYGENATOR ADULT (OUTSIDE OR) N/A 2/27/2018    Procedure: INSERT EXTRACORPORAL MEMBRANE OXYGENATOR ADULT (OUTSIDE OR);  INSERT EXTRACORPORAL MEMBRANE OXYGENATOR ADULT (OUTSIDE OR) ;  Surgeon: Toby Hernandez MD;  [facility]    IR CVC TUNNEL PLACEMENT > 5 YRS OF AGE  10/25/2019    IR DIALYSIS FISTULOGRAM LEFT  3/2/2021    IR DIALYSIS FISTULOGRAM LEFT  11/2/2023    IR DIALYSIS FISTULOGRAM LEFT  3/5/2024    IR DIALYSIS MECH THROMB, PTA  3/2/2021    IR DIALYSIS PTA  11/2/2023    IR FLUORO 0-1 HOUR  5/7/2021    IR GASTRO JEJUNOSTOMY TUBE PLACEMENT  2/16/2021    IR PARACENTESIS  1/8/2020    IR THORACENTESIS  9/13/2019    IR TRANSCATHETER BIOPSY  1/8/2020    LASER CO2 BRONCHOSCOPY N/A 4/30/2021    Procedure: Flexible and Rigid Bronchoscopy and Tissue Debulking with CO2 Laser Assistance;  Surgeon: Mati Norris MD;  [facility]    LASER CO2 BRONCHOSCOPY N/A 6/11/2021    Procedure: BRONCHOSCOPY, Flexible and Rigid Bronchoscopy, Tissue Debulking with cryo Assistance, airway dilation,;  Surgeon: Mati Norris MD;  [facility]    LASER CO2 BRONCHOSCOPY N/A 9/16/2021    Procedure: BRONCHOSCOPY, flexible and rigid, CO2 Laser Debulking;  Surgeon: Mait Norris MD;  [facility]    LASER CO2 BRONCHOSCOPY N/A 2/11/2022    Procedure: flexible, rigid bronchoscopy, tissue debulking, airway dilation, co2 laser,  bronchoalveolar lavage;  Surgeon: Juana Baugh MD;  [facility]    LASER CO2 BRONCHOSCOPY Right 2023    Procedure: flexible bronchosocopy, tissue/tumor debulking, using CO2 laser, mitomycin application and argon plasma coagulation;  Surgeon: Mati Norris MD;  [facility]    LASER CO2 BRONCHOSCOPY N/A 2/15/2024    Procedure: Flexible, Rigid Bronchoscopy,Tumor/Tissue debulking using Carbon Dioxide (CO2) laser; balloon dilation;  Surgeon: Wilber Lin MD;  Location:  OR    no prior surgery      REMOVE EXTRACORPORAL MEMBRANE OXYGENATOR ADULT N/A 3/3/2018    Procedure: REMOVE EXTRACORPORAL MEMBRANE OXYGENATOR ADULT;  Removal of Right Femoral Venous and Right Axillary Arterial Extracorporeal Membrane Oxygenator;  Surgeon: Toby Hernandez MD;  [facility]    TRANSPLANT LUNG RECIPIENT SINGLE X2 Bilateral 3/1/2018    Procedure: TRANSPLANT LUNG RECIPIENT SINGLE X2;  Median Sternotomy, Extracorporeal Membrane Oxygenator, bilateral sequential lung transplant;  Surgeon: Toby Hernandez MD;  Location:  OR     Social and Family History:     Social History     Socioeconomic History    Marital status:      Spouse name: Not on file    Number of children: Not on file    Years of education: Not on file    Highest education level: Not on file   Occupational History    Not on file   Tobacco Use    Smoking status: Never    Smokeless tobacco: Never   Substance and Sexual Activity    Alcohol use: No     Alcohol/week: 1.0 standard drink of alcohol     Types: 1 Glasses of wine per week    Drug use: No    Sexual activity: Not on file   Other Topics Concern    Parent/sibling w/ CABG, MI or angioplasty before 65F 55M? No   Social History Narrative    3/6/2019 - Lives with . Has three daughters. Four grandchildren (two ). No pets. Travelled previously to [facility]. Has visited [place] several times.      Social Determinants of Health     Financial Resource  Strain: Low Risk  (9/20/2022)    Received from [facility], [facility]    Overall Financial Resource Strain (CARDIA)     Difficulty of Paying Living Expenses: Not hard at all   Food Insecurity: No Food Insecurity (9/20/2022)    Received from [facility], [facility]    Hunger Vital Sign     Worried About Running Out of Food in the Last Year: Never true     Ran Out of Food in the Last Year: Never true   Transportation Needs: No Transportation Needs (9/20/2022)    Received from [facility], [facility]    PRAPARE - Transportation     Lack of Transportation (Medical): No     Lack of Transportation (Non-Medical): No   Physical Activity: Inactive (9/20/2022)    Received from [facility], [facility]    Exercise Vital Sign     Days of Exercise per Week: 0 days     Minutes of Exercise per Session: 0 min   Stress: No Stress Concern Present (9/20/2022)    Received from [facility], [facility]    [employer] - Occupational Stress Questionnaire     Feeling of Stress : Not at all   Social Connections: Moderately Integrated (9/20/2022)    Received from [facility], [facility]    Social Connection and Isolation Panel [NHANES]     Frequency of Communication with Friends and Family: Twice a week     Frequency of Social Gatherings with Friends and Family: Twice a week     Attends Holiness Services: More than 4 times per year     Active Member of Clubs or Organizations: No     Attends Club or Organization Meetings: Not on file     Marital Status:    Interpersonal Safety: Not At Risk (4/15/2024)    Received from [facility]    Intimate Partner Violence     Are you in a relationship where you are physically hurt, threatened  and/or made to feel afraid?: No   Housing Stability: Unknown (9/20/2022)    Received from [facility], [facility] [employer] and [facility]    Housing Stability     In the last 12 months, was there a time when you were not able to pay the mortgage or rent on time?: No     Number of Places Lived in the Last Year: Not on file     Number of Places Lived in the Last Year (Outpatient): Not on file     In the last 12 months, was there a time when you did not have a steady place to sleep or slept in a shelter (including now)?: No     Family History   Problem Relation Age of Onset    Hypertension Mother     Arthritis Mother     Pancreatic Cancer Father     Diabetes Father      Allergies and Home Medications:     Allergies   Allergen Reactions    Isopropyl Alcohol Other (See Comments)     The alcohol burns the open areas on her skin when used as a skin prep for procedures    Vancomycin Other (See Comments)     Diffuse erythroderma with itching (improved with Benadryl) after receiving vancomycin over 1 hour. 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 Can use vancomycin in the future, but please give over slower infusion time (at least 2 hours) and premedicate with Benadryl.     Medications Prior to Admission   Medication Sig Dispense Refill Last Dose    acetaminophen (TYLENOL) 325 MG tablet Take 1 tablet (325 mg) by mouth every 4 hours as needed for mild pain or fever 60 tablet  6/17/2024    acetylcysteine (MUCOMYST) 10 % nebulizer solution Inhale 4 mLs into the lungs 3 times daily as needed for mucolysis/respiratory distress   Past Week    albuterol (PROVENTIL) (2.5 MG/3ML) 0.083% neb solution Take 1 vial (2.5 mg) by nebulization every 12 hours   Past Week    amoxicillin (AMOXIL) 875 MG tablet Take 1 tablet (875 mg) by mouth 2 times daily 60 tablet 1 6/17/2024 at am    azithromycin (ZITHROMAX) 250 MG tablet Take 1 tablet (250 mg) by  mouth daily   6/17/2024 at am    benzonatate (TESSALON) 100 MG capsule Take 1 capsule (100 mg) by mouth every 4 hours as needed for cough (Patient taking differently: Take 100 mg by mouth Monday, Wednesday, Friday.) 30 capsule 0 6/17/2024    cholecalciferol 50 MCG (2000 UT) CAPS Take 1 capsule by mouth daily On dialysis days (MWF)   6/16/2024 at pm    fluticasone-salmeterol (ADVAIR) 250-50 MCG/ACT inhaler Inhale 1 puff into the lungs every 12 hours 60 each 11 6/17/2024 at pm    magnesium oxide 400 MG CAPS Take 400 mg by mouth Tuesday, Thursday, Saturday, Sunday 6/16/2024 at pm    metoprolol tartrate (LOPRESSOR) 25 MG tablet Take 1 tablet (25 mg) by mouth 2 times daily   6/17/2024 at pm    montelukast (SINGULAIR) 10 MG tablet Take 1 tablet (10 mg) by mouth At Bedtime 30 tablet 11 6/17/2024    multivitamin RENAL (RENAVITE RX/NEPHROVITE) 1 tablet tablet Take 1 tablet by mouth Tuesday, Thursday, Saturday, Sunday in evening.   6/16/2024 at pm    pantoprazole (PROTONIX) 40 MG EC tablet Take 1 tablet (40 mg) by mouth every morning (before breakfast) 30 tablet 11 6/17/2024 at am    posaconazole (NOXAFIL) 100 MG EC tablet Take 3 tablets (300 mg) by mouth daily (Patient taking differently: Take 300 mg by mouth daily At noon) 90 tablet 11 6/17/2024    predniSONE (DELTASONE) 5 MG tablet Take 1 tablet (5 mg) by mouth daily 30 tablet 11 6/17/2024    sulfamethoxazole-trimethoprim (BACTRIM) 400-80 MG tablet Take 1 tablet by mouth three times a week (Patient taking differently: Take 1 tablet by mouth three times a week [facility]) 13 tablet 11 6/17/2024 at am    tacrolimus (GENERIC) 1 mg/mL suspension Take 0.6 mLs (0.6 mg) by mouth every morning AND 0.7 mLs (0.7 mg) every evening. 39 mL 11 6/17/2024    Magnesium Cl-Calcium Carbonate (SLOW-MAG) 71.5-119 MG TBEC Take 2 tablets by mouth four times a week Take on non dialysis days T/Th/Sa/Su 90 tablet 3 6/16/2024     Current Scheduled Meds  Current Facility-Administered Medications    Medication Dose Route Frequency Provider Last Rate Last Admin    acetylcysteine (MUCOMYST) 10 % nebulizer solution 4 mL  4 mL Inhalation 4x Daily Kajal Chong APRN CNP   4 mL at 06/18/24 2101    albuterol (PROVENTIL) neb solution 2.5 mg  2.5 mg Nebulization 4x Daily Kajal Chong APRN CNP   2.5 mg at 06/18/24 2101    azithromycin (ZITHROMAX) 500 mg in sodium chloride 0.9 % 250 mL intermittent infusion  500 mg Intravenous Q24H Velez Reyes, German, MD   500 mg at 06/18/24 2056    [START ON 6/20/2024] calcium carbonate (TUMS) chewable tablet 500 mg  500 mg Oral Once per day on Sunday Tuesday Thursday Saturday Josesito Pratt MD        chlorhexidine (PERIDEX) 0.12 % solution 15 mL  15 mL Mouth/Throat Q12H Chio Cortez MD        fluticasone-vilanterol (BREO ELLIPTA) 100-25 MCG/ACT inhaler 1 puff  1 puff Inhalation Daily Josesito Pratt MD        heparin ANTICOAGULANT injection 5,000 Units  5,000 Units Subcutaneous Q8H Velez Reyes, German, MD   5,000 Units at 06/19/24 0434    [START ON 6/20/2024] magnesium chloride CR tablet 1,070 mg  1,070 mg Oral Once per day on Sunday Tuesday Thursday Saturday Josesito Pratt MD        [Held by provider] metoprolol tartrate (LOPRESSOR) tablet 25 mg  25 mg Oral BID Velez Reyes, German, MD        montelukast (SINGULAIR) tablet 10 mg  10 mg Oral At Bedtime Velez Reyes, German, MD   10 mg at 06/19/24 0028    pantoprazole (PROTONIX) IV push injection 40 mg  40 mg Intravenous Daily with breakfast Chio Cortez MD        piperacillin-tazobactam (ZOSYN) 2.25 g vial to attach to  ml bag  2.25 g Intravenous Q6H Josesito Pratt MD   2.25 g at 06/19/24 0509    posaconazole (NOXAFIL) DR tablet TBEC 300 mg  300 mg Oral Daily Velez Reyes, German, MD   300 mg at 06/18/24 2045    predniSONE (DELTASONE) tablet 5 mg  5 mg Oral Daily Velez Reyes, German, MD   5 mg at 06/18/24 2044    sulfamethoxazole-trimethoprim (BACTRIM) 400-80 MG per tablet 1 tablet  1 tablet  Oral Once per day on Monday Wednesday Friday Velez Reyes, German, MD        tacrolimus (GENERIC) suspension 0.6 mg  0.6 mg Oral QAM Velez Reyes, German, MD        And    tacrolimus (GENERIC) suspension 0.7 mg  0.7 mg Oral QPM Velez Reyes, German, MD   0.7 mg at 06/18/24 2045    vancomycin place jordan - receiving intermittent dosing  1 each Intravenous See Admin Instructions Nikita Schmidt MD        Vitamin D3 (CHOLECALCIFEROL) tablet 50 mcg  50 mcg Oral Once per day on Monday Wednesday Friday Velez Reyes, German, MD          Current PRN Meds  Current Facility-Administered Medications   Medication Dose Route Frequency Provider Last Rate Last Admin    acetaminophen (TYLENOL) tablet 325 mg  325 mg Oral Q4H PRN Velez Reyes, German, MD   325 mg at 06/19/24 0455    benzonatate (TESSALON) capsule 100 mg  100 mg Oral Q4H PRN Velez Reyes, German, MD        carboxymethylcellulose PF (REFRESH PLUS) 0.5 % ophthalmic solution 1 drop  1 drop Both Eyes Q1H PRN Velez Reyes, German, MD        glucose gel 15-30 g  15-30 g Oral Q15 Min PRN Velez Reyes, German, MD        Or    dextrose 50 % injection 25-50 mL  25-50 mL Intravenous Q15 Min PRN Velez Reyes, German, MD   50 mL at 06/19/24 0446    Or    glucagon injection 1 mg  1 mg Subcutaneous Q15 Min PRN Velez Reyes, German, MD        fentaNYL (SUBLIMAZE) 50 mcg/mL bolus from pump  50 mcg Intravenous Q1H PRN Chio Cortez MD   50 mcg at 06/18/24 2318    propofol (DIPRIVAN) bolus from bag or syringe pump  10 mg Intravenous Q15 Min PRN Chio Cortez MD   10 mg at 06/18/24 2313    And    Medication Instruction   Does not apply Continuous PRN Chio Cortez MD        naloxone (NARCAN) injection 0.2 mg  0.2 mg Intravenous Q2 Min PRN Josesito Pratt MD        Or    naloxone (NARCAN) injection 0.4 mg  0.4 mg Intravenous Q2 Min PRN Josesito Pratt MD        Or    naloxone (NARCAN) injection 0.2 mg  0.2 mg Intramuscular Q2 Min PRN Josesito Pratt MD        Or  "   naloxone (NARCAN) injection 0.4 mg  0.4 mg Intramuscular Q2 Min PRN Josesito Pratt MD        polyethylene glycol (MIRALAX) Packet 17 g  17 g Oral Daily PRN Kajal Chong APRN CNP        prochlorperazine (COMPAZINE) injection 10 mg  10 mg Intravenous Q6H PRN Velez Reyes, German, MD        Or    prochlorperazine (COMPAZINE) tablet 10 mg  10 mg Oral Q6H PRN Velez Reyes, German, MD        Or    prochlorperazine (COMPAZINE) suppository 25 mg  25 mg Rectal Q12H PRN Velez Reyes, German, MD        senna-docusate (SENOKOT-S/PERICOLACE) 8.6-50 MG per tablet 1 tablet  1 tablet Oral BID PRN Kajal Chong APRN CNP            Physical Exam:     All notes, images, and labs from past 24 hours (at minimum) were reviewed.    Vital signs:  Temp: 100  F (37.8  C) Temp src: Oral BP: 94/42 Pulse: 81   Resp: 19 SpO2: 100 % O2 Device: Mechanical Ventilator Oxygen Delivery: 6 LPM Height: 160 cm (5' 3\") Weight: 52.8 kg (116 lb 6.4 oz)  I/O:   Intake/Output Summary (Last 24 hours) at 6/19/2024 0708  Last data filed at 6/19/2024 0600  Gross per 24 hour   Intake 991.79 ml   Output 0 ml   Net 991.79 ml     Constitutional: Lying in bed, spouse at bedside, in no apparent distress.   HEENT: Eyes with pink conjunctivae, anicteric.  Orally intubated.  PULM: Good air flow bilaterally.  Diffuse crackles t/o right, no rhonchi, no wheezes.  Non-labored breathing on CMV 19/300/5/40, PIP 25.  CV: Normal S1 and S2.  RRR.  No murmur, gallop, or rub.  No peripheral edema.   ABD: NABS, soft, nontender, nondistended.    MSK: Moves all extremities.  No apparent muscle wasting.   NEURO: Lethargic but responsive to voice and answering simple questions.   SKIN: Warm, dry.  No rash on limited exam.   PSYCH: Mood stable.     Results:     LABS    CMP:   Recent Labs   Lab 06/19/24  0613 06/19/24  0443 06/18/24  2348 06/18/24  1942 06/18/24  1803 06/18/24  1750 06/18/24  1418 06/17/24  0925   0000   *  --   --   --  137  --  135  --   --    POTASSIUM 3.5 " " --   --   --  4.1  --  4.6  --   --    CHLORIDE 98  --   --   --  100  --  101 101  --    CO2 22  --   --   --  27  --  26  --   --    ANIONGAP 14  --   --   --  10  --  8  --   --    * 40* 105* 81 100*   < > 107*  --    < >   BUN 21.8  --   --   --  17.8  --  16.4  --   --    CR 3.74*  --   --   --  3.33*  --  3.13*  --   --    GFRESTIMATED 13*  --   --   --  15*  --  16*  --   --    CHELSEY 8.0*  --   --   --  8.5*  --  8.7*  --   --    MAG  --   --   --   --  2.0  --   --   --   --    PHOS  --   --   --   --  4.1  --   --   --   --    PROTTOTAL 4.7*  --   --   --  6.3*  --  6.5  --   --    ALBUMIN 2.7*  --   --   --  3.6  --  3.6  --   --    BILITOTAL 0.8  --   --   --  0.6  --  0.6  --   --    ALKPHOS 175*  --   --   --  235*  --  237*  --   --    AST 31  --   --   --  39  --   --   --   --    ALT 28  --   --   --  39  --  39  --   --     < > = values in this interval not displayed.     CBC:   Recent Labs   Lab 06/19/24  0613 06/18/24  1834 06/18/24  1803 06/18/24  1418   WBC 3.2* 4.0 4.7 4.5   RBC 3.10* 3.56* 3.85 3.85   HGB 9.6* 11.2* 12.1 12.1   HCT 30.9* 36.9 39.5 39.7    104* 103* 103*   MCH 31.0 31.5 31.4 31.4   MCHC 31.1* 30.4* 30.6* 30.5*   RDW 14.6 14.7 14.9 14.8   * 147* 123* 147*       INR:   Recent Labs   Lab 06/18/24  1418   INR 0.98       Glucose:   Recent Labs   Lab 06/19/24  0613 06/19/24  0443 06/18/24  2348 06/18/24  1942 06/18/24  1803 06/18/24  1750   * 40* 105* 81 100* 174*       Blood Gas:   Recent Labs   Lab 06/19/24  0614 06/18/24  2348 06/18/24  2107   PHV 7.45* 7.41 7.18*   PCO2V 37* 43 81*   PO2V 43 43 51*   HCO3V 26 27 30*   LASHAUN 1.9 1.9 0.3   O2PER 45 40 40       Culture Data No results for input(s): \"CULT\" in the last 168 hours.    Virology Data:   Lab Results   Component Value Date    FLUAH1 Not Detected 06/18/2024    FLUAH3 Not Detected 06/18/2024    [id number] Not Detected 06/18/2024    IFLUB Not Detected 06/18/2024    RSVA Not Detected 06/18/2024    RSVB Not " Detected 06/18/2024    PIV1 Not Detected 06/18/2024    PIV2 Not Detected 06/18/2024    PIV3 Not Detected 06/18/2024    HMPV Not Detected 06/18/2024    HRVS Negative 01/24/2021    ADVBE Negative 01/24/2021    ADVC Negative 01/24/2021    ADVC Negative 12/23/2020    ADVC Negative 10/07/2019       Historical CMV results (last 3 of prior testing):  Lab Results   Component Value Date    CMVQNT Not Detected 03/05/2024    CMVQNT Not Detected 12/19/2023    CMVQNT Not Detected 11/21/2023     Lab Results   Component Value Date    CMVLOG 2.5 02/14/2024    CMVLOG <1.5 10/31/2023    CMVLOG 2.0 10/24/2023    CMVLOG 1.9 10/24/2023       Urine Studies    Recent Labs   Lab Test 01/24/21  1729 10/21/19  2240   URINEPH 5.0 5.0   NITRITE Negative Negative   LEUKEST Moderate* Large*   WBCU 34* 115*       Most Recent [place] Pulmonary Function Testing (FVC/FEV1 only)  FVC-Pre   Date Value Ref Range Status   02/14/2024 0.85 L    12/19/2023 1.04 L    11/21/2023 0.85 L    10/24/2023 0.96 L      FVC-%Pred-Pre   Date Value Ref Range Status   02/14/2024 29 %    12/19/2023 36 %    11/21/2023 29 %    10/24/2023 33 %      FEV1-Pre   Date Value Ref Range Status   02/14/2024 0.80 L    12/19/2023 0.89 L    11/21/2023 0.78 L    10/24/2023 0.87 L      FEV1-%Pred-Pre   Date Value Ref Range Status   02/14/2024 34 %    12/19/2023 38 %    11/21/2023 33 %    10/24/2023 37 %        IMAGING    Recent Results (from the past 48 hour(s))   CT Chest Hi-Resolution wo Contrast    Narrative    High-resolution chest CT without contrast    INDICATION: Worsening shortness of breath. Post lung transplant with  stent.    COMPARISON: Similar examination to/14/24    FINDINGS: No contrast. Inspiration and expiration supine imaging  obtained. Included thyroid appears unremarkable. Median sternotomy  from prior bilateral lung transplantation. Heart size is borderline  enlarged. Left atrium is enlarged. There are mitral annular  calcifications which appear to correlate with a  left atrial  enlargement, please also correlate for any evidence of cardiac  arrhythmia. There is no pericardial effusion. There are small  bilateral pleural effusions. Rim calcified density within the medial  aspect left pleural effusion is unchanged. No enlarged axillary lymph  nodes. No enlarged mediastinal or hilar lymph nodes. The thoracic  aorta does show some mild atherosclerotic calcification. It is  borderline enlarged in size with its midascending portion approximate  4.0 cm in caliber. The main pulmonary artery is also borderline  enlarged at 3.3 cm.  Small calcific densities in the nondistended gallbladder consistent  with gallstones. Parallel tram track calcifications of the tortuous  splenic artery are also noted, please correlate for any evidence of  chronic renal disease. No other suspicious upper abdominal finding.  Bone detail shows T5 osteoporotic compression deformity unchanged. The  sternal cerclage wires appear grossly intact. No sternal erosions.    Detail of the lungs shows diffuse consolidative than some  nodular/patchy densities in the right upper and lower lobes as well as  the lingula and left lower lobe. Other subtle nodular densities  throughout the lung apices are less apparent than on the lower lobes.  Azygos fissure anatomical variant in the right upper lobe medially.  Comparison with the February examination shows the findings to show  more scattered opacities throughout the lower lobes especially on the  right.  There is narrowing of bronchus intermedius and also the right mainstem  bronchus distal to the mainstem anastomosis from transplant. There is  new occlusion of the right middle lobe bronchus from its takeoff  distally. Narrowing of the right lower lobe distal aspect of the lobar  level the bronchus with severe narrowings of the right posterior and  medial basilar segment airways.  Expiratory imaging shows mild air trapping.      Impression    IMPRESSION: Bilateral  transplanted lungs. Slight decreased confluent  consolidative opacities in right lower lobe with more micronodular  component of possible infection in the right lower lobe. Other  abnormalities throughout the lungs similar to February of the  transplanted lungs. Pleural effusions again noted. Narrowing of the  bronchus intermedius on the right there appears to be occlusion of the  right middle lobe bronchus with narrowing of the distal aspect of the  right lower lobe bronchus with short segment occlusions of the medial  and posterior basilar bronchial segments to the right lower lobe. No  such abnormality on the left.. No ectopic air. Anastomoses appear  grossly intact bilaterally with just under approximate 50% narrowing  distal to the right mainstem anastomosis proximal to the upper lobe  bronchus takeoff. This right middle lobe bronchial occlusion is new  from February.    Mitral annular calcifications with left atrial prominence, please  correlate for arrhythmia.  Borderline pulmonary enlargement concerning for possible pulmonary  hypertension.  Borderline mid ascending aortic ectasia.    TERRI ISSA MD         SYSTEM ID:  [id number]   XR Chest Port 1 View    Impression    RESIDENT PRELIMINARY INTERPRETATION  IMPRESSION:  ET tube tip projects 1.6 cm above the gee. Small bilateral pleural  effusions with bibasilar, right greater than left airspace opacities  suspicious for infection versus atelectasis.   XR Abdomen Port 1 View    Impression    RESIDENT PRELIMINARY INTERPRETATION  IMPRESSION:   Gastric tube tip and sidehole are within the stomach.

## 2024-06-19 NOTE — PROGRESS NOTES
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     CHANGES and MAJOR THINGS TODAY:   - Plan for bronchoscopy today with BAL  - Discontinue Vanco  - Continue Zosyn + Azithromycin  - Check posaconazole level in AM  - Place post pyloric feeding tube, start enteral nutrition      PLAN:    Neuro:  # Pain and sedation  #Toxic Metabolic Encephalopathy 2/2 Hypercapnia, improving   Reports history of 2-3 days of lethargy, decreased appetite, and fatigue. No clear onset and history corroborated by spouse. Symptoms readily resolved after placement on BiPAP for 20 minutes. VBG before BiPAP with pCO2 88 rapidly decreased to 55 on BiPAP. Trial off BiPAP not successful with pCO2 on VBG back to 98. All likely in the setting of multifocal pneumonia and low respiratory reserve s/p transplant and thoracic weakness.   - Propofol gtt  - Fentanyl gtt   - Tylenol PRN  - RASS goal 0 to -1  - Delirium precautions     Pulmonary:  # Acute hypercapnic respiratory failure  # RLL pneumonia  Presented to ED on 6/18/2024 with shortness of breath, VBG with respiratory acidosis requiring BiPAP support. Consider acute hypercapnic respiratory failure 2/2 post-obstructive vs community-acquired pneumonia and inability to clear secretions in setting of weak cough effort, bronchial stenosis, and decreased minute ventilation in the setting of chest wall weakness. Will cover broadly. Bicarb wnl, although not able to mount good metabolic  compensation in the setting of ESRD as per below. 6/18 intubated due to failure to clear CO2 despite AVAPS   - SAT/SBT daily as able  - Continue Abx               - Zosyn + Zithromax  - Airway clearance measures:                - Mucinex 1200 mg BID               - Mucomyst + Albuterol QID                - Volera QID     Vent Mode: CMV/AC  (Continuous Mandatory Ventilation/ Assist Control)  FiO2 (%): 40 %  Resp Rate (Set): 19 breaths/min  Tidal Volume (Set, mL): 300 mL  PEEP (cm H2O): 5 cmH2O  Resp: 19    # Recurrent right bronchial stenosis   # Possible new right lower lower lobe stenosis   Last bronchoscopy 2/15/24 with mild narrowing of right mainstem anastomosis and bronchus intermedius s/p dilation. Previous dilations 11/2023 and 10/2023. Last had stent removed 11/2018 due to migration. Was initially scheduled for bronchoscopy with dilation on 6/26/2024. 6/18/2024 CT Chest with narrowing of the right BI with occlusion of the RML bronchus and narrowing of the distal aspect of the RLL bronchus with short segment occlusions of the medial and posterior basilar bronchial segments to RLL.  - IP consulted, appreciate recs               - Will discuss plans for bronch + dilation when IP available tomorrow      # Hx ILD 2/2 anti-synthetase syndrome s/p BSLT 3/2018 c/b CLAD  - Transplant pulm consult, appreciate recs  - PTA nebs:               - Albuterol nebs               - Advair inhaler BID  - Immunosuppressants per Tx Pulm:                - Tacrolimus (dosing per tx pulm)               - Prednisone 5 mg daily               - Last Lung transplant visit holding AZA due to leukopenia and repeated infections      Cardiovascular:  # Hypotension  - Check lactate  - NE for MAP goal > 65 mmHg  - Plan for TTE this AM    # HTN  # Hx Paroxysmal A-Fib  # Pulmonary Hypertension   Last TTE 10/2023 LVEF 60-65%, no regional wall motion abnormalities, global RV function normal, no significant valvular abnormalities, pulmonary HTN  with RVSP 45 mmHg above RAP  - Not on AC   - HOLD PTA Metoprolol 25 mg BID in setting of borderline MAP     # Elevated troponin  Noted troponin to 499. No recent previous troponin. No chest pain currently, nor hypoxia. Recent TTE unimpressive except for pulm hypertension as per above. EKG without changes, no new ST elevations.   - Trop peaked at 499, EKG without acute ischemic changes   - TTE     GI/Nutrition:  # Elevated alk phos  Appears to be chronically elevated. RUQ US 11/2023 with increased hepatic parenchymal echogenicity and mild hepatomegaly, cholelithiasis with borderline gallbladder wall thickening. Lipase negative.   -  and AlkPhos isoenzymes in process  - Defer repeat RUQ US given alk phos downtrending   - Place post pyloric feeding tube, start enteral nutrition  - RD consult, appreciate recs  - Bowel regimen PRN      Renal/Fluids/Electrolytes:  # ESRD on iHD (M,W,F)  - Nephrology consult, appreciate recs    - Renally dose medications  - Provider to replace electrolytes  - BMP, Mg, Phos daily     Endocrine:  - Hypoglycemia protocol   - Goal blood glucose 140 - 180  - Monitor for hyperglycemia      ID:  # RLL pneumonia  Presents with shortness of breath and acute hypercapnic respiratory failure. CT chest with consolidative opacities in RLL with micronodular component concerning for infection. Decreased cough in the setting of weakness. At risk for atypicals and antibiotic resistant pathogens given prolonged IS. Will continue broad workup. Found to be tachypneic, with diffused rhonchi and diminished breath sounds all over. Procal 0.23, CRP mildly elevated at 8.74, lactate 0.6.   - Transplant ID consult, appreciate recs  - Continue Zosyn and Azithromycin  - Discontinue Vanco, MRSA nares negative   - If clinical decompensation, consider adding Minocycline given hx of Steno  - Blood cultures, fungitell, Aspergillus galactomannan Ag, coccidioides Ab, Histo Ag, Strep/Legionella urine, UA, sputum  culture  - Bronch with BAL today     # Hx left-sided aspergillus empyema  A. Ochraceus noted in 2019 s/p needle aspiration with Aspergillus fumigatus on cultures s/p intrapleural amphotericin bead placement. Now with A. Ochraceus on BAL from 11/2024. Posaconazole indefinitely per ID.   - Continue PTA posaconazole  - Check Posaconazole level in AM     # Recurrent CMV Viremia  Low grade CMV viremia 8/2023  - Last level 46 on 6/12  - Valcyte on iHD days  - Repeat PCR today     # EBV Viremia  Level peaked at 1 million on 11/2021 s/p rituxan.   - Last level undetected on 5/29   - Repeat PCR today     # Hx PJP   - Bactrim for PJP ppx     Hematology:    # Anemia of CKD  # Thrombocytopenia, chronic  - Daily CBC   - Peripheral Smear      Musculoskeletal:  - PT / OT consult         General Cares/Prophylaxis:    DVT Prophylaxis: Heparin SQ  GI Prophylaxis: PPI  Restraints: None  Family Communication:  at bedside  Code Status: Full Code     Lines/tubes/drains:  - PIVs  - ET tube     Disposition:  - Medical ICU     Patient seen and findings/plan discussed with medical ICU staff, Dr. Pratt.    Kajal Chong, APRN CNP    41 minutes of critical care time    Clinically Significant Risk Factors Present on Admission              # Hypoalbuminemia: Lowest albumin = 2.7 g/dL at 6/19/2024  6:13 AM, will monitor as appropriate   # Thrombocytopenia: Lowest platelets = 106 in last 2 days, will monitor for bleeding     # Circulatory Shock: required vasopressors within past 24 hours    # Acute Respiratory Failure: based on blood gas results.  Continue supplemental oxygen as needed   # Anemia: based on hgb <11        #Precipitous drop in Hgb/Hct: Lowest Hgb this hospitalization: 9.6 g/dL. Will continue to monitor and treat/transfuse as appropriate.         # Financial/Environmental Concerns:        # Anemia: based on hgb <11             ====================================  INTERVAL HISTORY:   Intubated overnight in setting of  worsening respiratory acidosis despite AVAPS. Started on peripheral norepi to maintain MAP > 65 mmHg    OBJECTIVE:   1. VITAL SIGNS:   Temp:  [97.5  F (36.4  C)-100.7  F (38.2  C)] 100  F (37.8  C)  Pulse:  [63-88] 81  Resp:  [16-26] 19  BP: ()/() 94/42  FiO2 (%):  [40 %] 40 %  SpO2:  [93 %-100 %] 100 %  Vent Mode: CMV/AC  (Continuous Mandatory Ventilation/ Assist Control)  FiO2 (%): 40 %  Resp Rate (Set): 19 breaths/min  Tidal Volume (Set, mL): 300 mL  PEEP (cm H2O): 5 cmH2O  Resp: 19    2. INTAKE/ OUTPUT:   I/O last 3 completed shifts:  In: 991.79 [I.V.:941.79; NG/GT:50]  Out: 0     3. PHYSICAL EXAMINATION:  General: Sedated, NAD   HEENT: Atraumatic,  moist mucous membranes   Neuro: Sedated, arouses to voice, follows commands, moving all extremities  Pulm/Resp: Lungs coarse with rhonchi R>L, breathing nonlabored on mechanical ventilation, minimal secretions from ET tube.   CV: RRR, no m/r/g   Abdomen: Soft, non-distended, non-tender  Ext: No edema, pulses 2+ radial, pedal  Incisions/Skin: No rashes or lesions    4. LABS:   Arterial Blood Gases   No lab results found in last 7 days.  Complete Blood Count   Recent Labs   Lab 06/19/24  0613 06/18/24  1834 06/18/24  1803 06/18/24  1418   WBC 3.2* 4.0 4.7 4.5   HGB 9.6* 11.2* 12.1 12.1   * 147* 123* 147*     Basic Metabolic Panel  Recent Labs   Lab 06/19/24  0613 06/19/24  0443 06/18/24  2348 06/18/24  1942 06/18/24  1803 06/18/24  1750 06/18/24  1418 06/17/24  0925   0000   *  --   --   --  137  --  135  --   --    POTASSIUM 3.5  --   --   --  4.1  --  4.6  --   --    CHLORIDE 98  --   --   --  100  --  101 101  --    CO2 22  --   --   --  27  --  26  --   --    BUN 21.8  --   --   --  17.8  --  16.4  --   --    CR 3.74*  --   --   --  3.33*  --  3.13*  --   --    * 40* 105* 81 100*   < > 107*  --    < >    < > = values in this interval not displayed.     Liver Function Tests  Recent Labs   Lab 06/19/24  0613 06/18/24  1802  06/18/24  1418   AST 31 39  --    ALT 28 39 39   ALKPHOS 175* 235* 237*   BILITOTAL 0.8 0.6 0.6   ALBUMIN 2.7* 3.6 3.6   INR  --   --  0.98     Coagulation Profile  Recent Labs   Lab 06/18/24  1418   INR 0.98       5. RADIOLOGY:   Recent Results (from the past 24 hour(s))   CT Chest Hi-Resolution wo Contrast    Narrative    High-resolution chest CT without contrast    INDICATION: Worsening shortness of breath. Post lung transplant with  stent.    COMPARISON: Similar examination to/14/24    FINDINGS: No contrast. Inspiration and expiration supine imaging  obtained. Included thyroid appears unremarkable. Median sternotomy  from prior bilateral lung transplantation. Heart size is borderline  enlarged. Left atrium is enlarged. There are mitral annular  calcifications which appear to correlate with a left atrial  enlargement, please also correlate for any evidence of cardiac  arrhythmia. There is no pericardial effusion. There are small  bilateral pleural effusions. Rim calcified density within the medial  aspect left pleural effusion is unchanged. No enlarged axillary lymph  nodes. No enlarged mediastinal or hilar lymph nodes. The thoracic  aorta does show some mild atherosclerotic calcification. It is  borderline enlarged in size with its midascending portion approximate  4.0 cm in caliber. The main pulmonary artery is also borderline  enlarged at 3.3 cm.  Small calcific densities in the nondistended gallbladder consistent  with gallstones. Parallel tram track calcifications of the tortuous  splenic artery are also noted, please correlate for any evidence of  chronic renal disease. No other suspicious upper abdominal finding.  Bone detail shows T5 osteoporotic compression deformity unchanged. The  sternal cerclage wires appear grossly intact. No sternal erosions.    Detail of the lungs shows diffuse consolidative than some  nodular/patchy densities in the right upper and lower lobes as well as  the lingula and left  lower lobe. Other subtle nodular densities  throughout the lung apices are less apparent than on the lower lobes.  Azygos fissure anatomical variant in the right upper lobe medially.  Comparison with the February examination shows the findings to show  more scattered opacities throughout the lower lobes especially on the  right.  There is narrowing of bronchus intermedius and also the right mainstem  bronchus distal to the mainstem anastomosis from transplant. There is  new occlusion of the right middle lobe bronchus from its takeoff  distally. Narrowing of the right lower lobe distal aspect of the lobar  level the bronchus with severe narrowings of the right posterior and  medial basilar segment airways.  Expiratory imaging shows mild air trapping.      Impression    IMPRESSION: Bilateral transplanted lungs. Slight decreased confluent  consolidative opacities in right lower lobe with more micronodular  component of possible infection in the right lower lobe. Other  abnormalities throughout the lungs similar to February of the  transplanted lungs. Pleural effusions again noted. Narrowing of the  bronchus intermedius on the right there appears to be occlusion of the  right middle lobe bronchus with narrowing of the distal aspect of the  right lower lobe bronchus with short segment occlusions of the medial  and posterior basilar bronchial segments to the right lower lobe. No  such abnormality on the left.. No ectopic air. Anastomoses appear  grossly intact bilaterally with just under approximate 50% narrowing  distal to the right mainstem anastomosis proximal to the upper lobe  bronchus takeoff. This right middle lobe bronchial occlusion is new  from February.    Mitral annular calcifications with left atrial prominence, please  correlate for arrhythmia.  Borderline pulmonary enlargement concerning for possible pulmonary  hypertension.  Borderline mid ascending aortic ectasia.    TERRI ISSA MD         SYSTEM  ID:  [id number]   XR Chest Port 1 View    Impression    RESIDENT PRELIMINARY INTERPRETATION  IMPRESSION:  ET tube tip projects 1.6 cm above the gee. Small bilateral pleural  effusions with bibasilar, right greater than left airspace opacities  suspicious for infection versus atelectasis.   XR Abdomen Port 1 View    Impression    RESIDENT PRELIMINARY INTERPRETATION  IMPRESSION:   Gastric tube tip and sidehole are within the stomach.

## 2024-06-19 NOTE — PLAN OF CARE
ICU End of Shift Summary. See flowsheets for vital signs and detailed assessment.    Changes this shift: Intubated this shift for hypercapnia. RASS -2 to -3, on propofol 25 and fentanyl 50, follows commands, moves extremities. PRN bumps utilized post intubation to meet RASS goal. NSR. Tmax 100.7 PRN tylenol 1x. Initiated levo this AM to maintain MAP goal > 65. 45% fiO2 on full vent support w/ thick/creamy secretions. OG placed for meds. NPO. No BM. Bladder scan 71. Plan for HD today pending pressor needs.    Plan: HD. Establish central line access.    Goal Outcome Evaluation:      Plan of Care Reviewed With: patient, spouse    Overall Patient Progress: decliningOverall Patient Progress: declining    Outcome Evaluation: intubated this shift, remains on full vent settings, monitor ABGs, plan for extubation 1-2 days

## 2024-06-19 NOTE — CONSULTS
Nephrology Initial Consult  June 19, 2024      Kecia Blue MRN:[id number] YOB: 1960  Date of Admission:6/18/2024  Primary care provider: Ame Chow  Requesting physician: Josesito Pratt MD    ASSESSMENT AND RECOMMENDATIONS:   ESRD-ESKD: pt dialyzes MWF at [facility] (ph [phone], f [phone]) with Dr. Glasgow. Run time: 3.5 hrs. EDW 59.7 kg. Access: L AVF. +heparin 1,500u bolus.     -Considered run today but no issue in chemistries and is already at EDW, will hold and plan to run tomorrow.    -No need for new dialysis consent, continuing long term HD for ESRD.   -L AVF with remote hx of needing intervention, no issue recently.     Outpt Rx:      Volume-About at EDW, no edema.  Will see how I/O's and hemodynamics trend.      Pulm-Admitted with hypercapnic resp failure, suspected PNA. ID involved, getting bronch.  Currently on Zosyn, bactrim, Posaconazole, Azithromycin and Vanco x1 dose so far.      Electrolytes-No issues needing intervention.     BMD-No issues.     Anemia-Hgb 9.6, on venofer 50mg weekly but will hold while treating for infection. On Mircera 60mcg [id number], will cover with short term Epo while admitted.      Nutrition-Deferred    Time spent: 50 minutes on this date of encounter for chart review, physical exam, medical decision making and co-ordination of care.     Seen and discussed with Dr Tan    Recommendations were communicated to primary team via verbal communication.       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 Nephrology consulted for management of HD while admitted.      PAST MEDICAL HISTORY:  Past Medical History:   Diagnosis Date    Acute on chronic respiratory failure with hypoxia (H) 02/21/2018    Anisocoria     Antisynthetase syndrome (H24) 2014    Anxiety     Aspergillus (H)     Aspergillus pneumonia (H) 11/20/2020    Benign essential hypertension     C. difficile colitis     Cytomegalovirus (CMV) viremia (H)     Dermatomyositis (H)     Dysplasia of cervix, low grade (ESTRADA 1)     EBV (Franklin-Barr virus) viremia     ESRD (end stage renal disease) on dialysis (H)     ILD (interstitial lung disease) (H)     Lung replaced by transplant (H)     Osteopenia     Paroxysmal atrial fibrillation (H)     Pneumocystis jiroveci pneumonia (H)     PONV (postoperative nausea and vomiting)     Post-menopause     Pulmonary hypertension (H)     Raynaud's disease     Seronegative rheumatoid arthritis (H)        Past Surgical History:   Procedure Laterality Date    BRONCHOSCOPY (RIGID OR FLEXIBLE), DIAGNOSTIC N/A 4/10/2018    Procedure: COMBINED BRONCHOSCOPY (RIGID OR FLEXIBLE), LAVAGE;;  Surgeon: Mariposa Donohue MD;  [facility]    BRONCHOSCOPY (RIGID OR FLEXIBLE), DIAGNOSTIC N/A 12/23/2020    Procedure: BRONCHOSCOPY, WITH BRONCHOALVEOLAR LAVAGE;  Surgeon: Uri Bass MD;  [facility]    BRONCHOSCOPY (RIGID OR FLEXIBLE), DIAGNOSTIC N/A 5/26/2022    Procedure: BRONCHOSCOPY, WITH BRONCHOALVEOLAR LAVAGE;  Surgeon: Uri Bass MD;  [facility]    BRONCHOSCOPY (RIGID OR FLEXIBLE), DIAGNOSTIC N/A 8/17/2023    Procedure: BRONCHOSCOPY, WITH BRONCHOALVEOLAR LAVAGE;  Surgeon: Esau Bruno MD;  [facility]    BRONCHOSCOPY (RIGID OR FLEXIBLE), DIAGNOSTIC N/A 11/1/2023    Procedure: BRONCHOSCOPY, WITH BRONCHOALVEOLAR LAVAGE;  Surgeon: Beto Magaña DO;  [facility]    BRONCHOSCOPY (RIGID OR FLEXIBLE), DILATE BRONCHUS / TRACHEA N/A 10/11/2018    Procedure: BRONCHOSCOPY  (RIGID OR FLEXIBLE), DILATE BRONCHUS / TRACHEA;  Flexible And Rigid Bronchoscopy and Dilation;  Surgeon: Wilber Lin MD;  [facility]    BRONCHOSCOPY (RIGID OR FLEXIBLE), DILATE BRONCHUS / TRACHEA N/A 11/1/2023    Procedure: Bronchoscopy (Rigid Or Flexible), Dilate Bronchus / Trachea;  Surgeon: Beto Magaña DO;  [facility]    BRONCHOSCOPY FLEXIBLE N/A 3/13/2018    Procedure: BRONCHOSCOPY FLEXIBLE;  Flexible Bronchoscopy ;  Surgeon: Gissell Sanchez MD;  [facility]    BRONCHOSCOPY FLEXIBLE N/A 5/9/2018    Procedure: BRONCHOSCOPY FLEXIBLE;;  Surgeon: Wilber Lin MD;  [facility]    BRONCHOSCOPY FLEXIBLE AND RIGID N/A 9/10/2018    Procedure: BRONCHOSCOPY FLEXIBLE AND RIGID;  Flexible and Rigid Bronchoscopy with Balloon Dilation, tissue debulking with cryo, and Right mainstem bronchus stent placement;  Surgeon: Wilber Lin MD;  [facility]    BRONCHOSCOPY RIGID N/A 6/6/2018    Procedure: BRONCHOSCOPY RIGID;;  Surgeon: Lopez Macias MD;  [facility]    BRONCHOSCOPY, DILATE BRONCHUS, STENT BRONCHUS, COMBINED N/A 6/11/2018    Procedure: COMBINED BRONCHOSCOPY, DILATE BRONCHUS, STENT BRONCHUS;  Flexible Bronchoscopy, Balloon Dilation, Bronchial Washings;  Surgeon: Wilber Lin MD;  [facility]    BRONCHOSCOPY, DILATE BRONCHUS, STENT BRONCHUS, COMBINED Right 7/10/2018    Procedure: COMBINED BRONCHOSCOPY, DILATE BRONCHUS, STENT BRONCHUS;  Flexible Bronchoscopy, Balloon Dilation, Bronchial Washings  ;  Surgeon: Wilber Lin MD;  [facility]    BRONCHOSCOPY, DILATE BRONCHUS, STENT BRONCHUS, COMBINED N/A 8/2/2018    Procedure: COMBINED BRONCHOSCOPY, DILATE BRONCHUS, STENT BRONCHUS;  Flexible Bronchoscopy, Bronchial Washings, Balloon Dilation;  Surgeon: Wilber Lin MD;  [facility]    BRONCHOSCOPY, DILATE BRONCHUS, STENT BRONCHUS, COMBINED N/A 8/20/2018    Procedure: COMBINED BRONCHOSCOPY, DILATE BRONCHUS, STENT  BRONCHUS;  Flexible Bronchoscopy, Balloon Dilation;  Surgeon: Wilber Lin MD;  [facility]    BRONCHOSCOPY, DILATE BRONCHUS, STENT BRONCHUS, COMBINED N/A 10/29/2018    Procedure: Flexible Bronchoscopy, Balloon Dilation, Stent Revision, Airway Examination And Therapeutic Suctioning, Cyro Tumor Debulking;  Surgeon: Wilber Lin MD;  [facility]    BRONCHOSCOPY, DILATE BRONCHUS, STENT BRONCHUS, COMBINED N/A 11/20/2018    Procedure: Rigid Bronchoscopy, Stent Removal and dilitation;  Surgeon: Wilber Lin MD;  [facility]    BRONCHOSCOPY, DILATE BRONCHUS, STENT BRONCHUS, COMBINED N/A 12/14/2018    Procedure: Flexible And Rigid Bronchoscopy, Balloon Dilation, Bronchial Washing;  Surgeon: Wilber Lin MD;  [facility]    BRONCHOSCOPY, DILATE BRONCHUS, STENT BRONCHUS, COMBINED N/A 1/17/2019    Procedure: Flexible And Rigid Bronchoscopy, Balloon Dilation.;  Surgeon: Wilber Lin MD;  [facility]    BRONCHOSCOPY, DILATE BRONCHUS, STENT BRONCHUS, COMBINED N/A 3/7/2019    Procedure: Flexible and Rigid Bronchoscopy, Bronchial Washing, Balloon Dilation;  Surgeon: Wilber Lin MD;  [facility]    BRONCHOSCOPY, DILATE BRONCHUS, STENT BRONCHUS, COMBINED N/A 6/6/2019    Procedure: Rigid and Flexible Bronchoscopy, Balloon Dilation;  Surgeon: Wilber Lin MD;  [facility]    BRONCHOSCOPY, DILATE BRONCHUS, STENT BRONCHUS, COMBINED N/A 10/11/2019    Procedure: Flexible and Rigid Bronchoscopy, Balloon Dilation, Bronchoalveolar Lagave;  Surgeon: Wilber Lin MD;  [facility]    BRONCHOSCOPY, DILATE BRONCHUS, STENT BRONCHUS, COMBINED N/A 2/19/2021    Procedure: BRONCHOSCOPY, flexible, airway dilation, and bronchial wash;  Surgeon: Wilber Lin MD;  [facility]    BRONCHOSCOPY, DILATE BRONCHUS, STENT BRONCHUS, COMBINED N/A 4/9/2021    Procedure: BRONCHOSCOPY, flexible and rigid, Airway suctioning;  Surgeon: Mati Norris  MD Preet;  [facility]    BRONCHOSCOPY, DILATE BRONCHUS, STENT BRONCHUS, COMBINED N/A 10/17/2023    Procedure: RIGID, flexible bronchoscopy with airway dilation;  Surgeon: Preet Patel MD;  [facility]    CV RIGHT HEART CATH MEASUREMENTS RECORDED N/A 3/10/2020    Procedure: CV RIGHT HEART CATH;  Surgeon: Wai Garcia MD;  [facility]    ENT SURGERY      tonsillectomy as a child    ESOPHAGOSCOPY, GASTROSCOPY, DUODENOSCOPY (EGD), COMBINED N/A 10/29/2018    Procedure: COMBINED ESOPHAGOSCOPY, GASTROSCOPY, DUODENOSCOPY (EGD) with biopsies and polypectomy;  Surgeon: Chente Bloom MD;  [facility]    INSERT EXTRACORPORAL MEMBRANE OXYGENATOR ADULT (OUTSIDE OR) N/A 2/27/2018    Procedure: INSERT EXTRACORPORAL MEMBRANE OXYGENATOR ADULT (OUTSIDE OR);  INSERT EXTRACORPORAL MEMBRANE OXYGENATOR ADULT (OUTSIDE OR) ;  Surgeon: Toby Hernandez MD;  [facility]    IR CVC TUNNEL PLACEMENT > 5 YRS OF AGE  10/25/2019    IR DIALYSIS FISTULOGRAM LEFT  3/2/2021    IR DIALYSIS FISTULOGRAM LEFT  11/2/2023    IR DIALYSIS FISTULOGRAM LEFT  3/5/2024    IR DIALYSIS MECH THROMB, PTA  3/2/2021    IR DIALYSIS PTA  11/2/2023    IR FLUORO 0-1 HOUR  5/7/2021    IR GASTRO JEJUNOSTOMY TUBE PLACEMENT  2/16/2021    IR PARACENTESIS  1/8/2020    IR THORACENTESIS  9/13/2019    IR TRANSCATHETER BIOPSY  1/8/2020    LASER CO2 BRONCHOSCOPY N/A 4/30/2021    Procedure: Flexible and Rigid Bronchoscopy and Tissue Debulking with CO2 Laser Assistance;  Surgeon: Mati Norris MD;  [facility]    LASER CO2 BRONCHOSCOPY N/A 6/11/2021    Procedure: BRONCHOSCOPY, Flexible and Rigid Bronchoscopy, Tissue Debulking with cryo Assistance, airway dilation,;  Surgeon: Mati Norris MD;  [facility]    LASER CO2 BRONCHOSCOPY N/A 9/16/2021    Procedure: BRONCHOSCOPY, flexible and rigid, CO2 Laser Debulking;  Surgeon: Mati Norris MD;  [facility]    LASER CO2 BRONCHOSCOPY N/A  2/11/2022    Procedure: flexible, rigid bronchoscopy, tissue debulking, airway dilation, co2 laser, bronchoalveolar lavage;  Surgeon: Juana Baugh MD;  [facility]    LASER CO2 BRONCHOSCOPY Right 11/17/2023    Procedure: flexible bronchosocopy, tissue/tumor debulking, using CO2 laser, mitomycin application and argon plasma coagulation;  Surgeon: Mati Norris MD;  [facility]    LASER CO2 BRONCHOSCOPY N/A 2/15/2024    Procedure: Flexible, Rigid Bronchoscopy,Tumor/Tissue debulking using Carbon Dioxide (CO2) laser; balloon dilation;  Surgeon: Wilber Lin MD;  [facility]    no prior surgery      REMOVE EXTRACORPORAL MEMBRANE OXYGENATOR ADULT N/A 3/3/2018    Procedure: REMOVE EXTRACORPORAL MEMBRANE OXYGENATOR ADULT;  Removal of Right Femoral Venous and Right Axillary Arterial Extracorporeal Membrane Oxygenator;  Surgeon: Toby Hernandez MD;  [facility]    TRANSPLANT LUNG RECIPIENT SINGLE X2 Bilateral 3/1/2018    Procedure: TRANSPLANT LUNG RECIPIENT SINGLE X2;  Median Sternotomy, Extracorporeal Membrane Oxygenator, bilateral sequential lung transplant;  Surgeon: Toby Hernandez MD;  [facility]        MEDICATIONS:  PTA Meds  Prior to Admission medications    Medication Sig Last Dose Taking? Auth Provider Long Term End Date   acetaminophen (TYLENOL) 325 MG tablet Take 1 tablet (325 mg) by mouth every 4 hours as needed for mild pain or fever 6/17/2024 Yes Leelee Huang MD     acetylcysteine (MUCOMYST) 10 % nebulizer solution Inhale 4 mLs into the lungs 3 times daily as needed for mucolysis/respiratory distress Past Week Yes Ame Chow PA-C Yes    albuterol (PROVENTIL) (2.5 MG/3ML) 0.083% neb solution Take 1 vial (2.5 mg) by nebulization every 12 hours Past Week Yes Ame Chow PA-C Yes    amoxicillin (AMOXIL) 875 MG tablet Take 1 tablet (875 mg) by mouth 2 times daily 6/17/2024 at am Yes Julia Espinoza MD     azithromycin (ZITHROMAX)  250 MG tablet Take 1 tablet (250 mg) by mouth daily 6/17/2024 at am Yes Ignacia Martin APRN CNP No    benzonatate (TESSALON) 100 MG capsule Take 1 capsule (100 mg) by mouth every 4 hours as needed for cough  Patient taking differently: Take 100 mg by mouth Monday, Wednesday, Friday. 6/17/2024 Yes Donny Shah MD     cholecalciferol 50 MCG (2000 UT) CAPS Take 1 capsule by mouth daily On dialysis days (MWF) 6/16/2024 at pm Yes Reported, Patient     fluticasone-salmeterol (ADVAIR) 250-50 MCG/ACT inhaler Inhale 1 puff into the lungs every 12 hours 6/17/2024 at pm Yes Ame Chow PA-C Yes    magnesium oxide 400 MG CAPS Take 400 mg by mouth Tuesday, Thursday, Saturday, Sunday 6/16/2024 at pm Yes Unknown, Entered By History No    metoprolol tartrate (LOPRESSOR) 25 MG tablet Take 1 tablet (25 mg) by mouth 2 times daily 6/17/2024 at pm Yes Ame Chow PA-C Yes    montelukast (SINGULAIR) 10 MG tablet Take 1 tablet (10 mg) by mouth At Bedtime 6/17/2024 Yes Ame Chow PA-C Yes    multivitamin RENAL (RENAVITE RX/NEPHROVITE) 1 tablet tablet Take 1 tablet by mouth Tuesday, Thursday, Saturday, Sunday in evening. 6/16/2024 at pm Yes Unknown, Entered By History No    pantoprazole (PROTONIX) 40 MG EC tablet Take 1 tablet (40 mg) by mouth every morning (before breakfast) 6/17/2024 at am Yes Ame Chow PA-C     posaconazole (NOXAFIL) 100 MG EC tablet Take 3 tablets (300 mg) by mouth daily  Patient taking differently: Take 300 mg by mouth daily At noon 6/17/2024 Yes Ame Chow PA-C     predniSONE (DELTASONE) 5 MG tablet Take 1 tablet (5 mg) by mouth daily 6/17/2024 Yes Ame Chow PA-C Yes    sulfamethoxazole-trimethoprim (BACTRIM) 400-80 MG tablet Take 1 tablet by mouth three times a week  Patient taking differently: Take 1 tablet by mouth three times a week [facility] 6/17/2024 at am Yes Ame Chow PA-C No    tacrolimus (GENERIC) 1 mg/mL suspension Take 0.6 mLs  (0.6 mg) by mouth every morning AND 0.7 mLs (0.7 mg) every evening. 6/17/2024 Yes Ame Chow PA-C No    Magnesium Cl-Calcium Carbonate (SLOW-MAG) 71.5-119 MG TBEC Take 2 tablets by mouth four times a week Take on non dialysis days T/Th/Sa/Su 6/16/2024  Ame Chow PA-C No       Current Meds  Current Facility-Administered Medications   Medication Dose Route Frequency Provider Last Rate Last Admin    acetylcysteine (MUCOMYST) 10 % nebulizer solution 4 mL  4 mL Inhalation 4x Daily Kajal Chong APRN CNP   4 mL at 06/19/24 0850    albuterol (PROVENTIL) neb solution 2.5 mg  2.5 mg Nebulization 4x Daily Kajal Chong APRN CNP   2.5 mg at 06/19/24 0850    azithromycin (ZITHROMAX) 500 mg in sodium chloride 0.9 % 250 mL intermittent infusion  500 mg Intravenous Q24H Velez Reyes, German, MD   500 mg at 06/18/24 2056    [START ON 6/20/2024] calcium carbonate (TUMS) chewable tablet 500 mg  500 mg Oral Once per day on Sunday Tuesday Thursday Saturday Josesito Pratt MD        chlorhexidine (PERIDEX) 0.12 % solution 15 mL  15 mL Mouth/Throat Q12H Chio Cortez MD   15 mL at 06/19/24 0817    fluticasone-vilanterol (BREO ELLIPTA) 100-25 MCG/ACT inhaler 1 puff  1 puff Inhalation Daily Josesito Pratt MD        heparin ANTICOAGULANT injection 5,000 Units  5,000 Units Subcutaneous Q8H Velez Reyes, German, MD   5,000 Units at 06/19/24 0434    lidocaine (viscous) (XYLOCAINE) 2 % solution 5 mL  5 mL Topical Once Awa Watt MD        [START ON 6/20/2024] magnesium chloride CR tablet 1,070 mg  1,070 mg Oral Once per day on Sunday Tuesday Thursday Saturday Josesito Pratt MD        [Held by provider] metoprolol tartrate (LOPRESSOR) tablet 25 mg  25 mg Oral BID Velez Reyes, German, MD        montelukast (SINGULAIR) tablet 10 mg  10 mg Oral At Bedtime Velez Reyes, German, MD   10 mg at 06/19/24 0028    pantoprazole (PROTONIX) IV push injection 40 mg  40 mg Intravenous Daily with breakfast  Chio Cortez MD   40 mg at 06/19/24 0817    piperacillin-tazobactam (ZOSYN) 2.25 g vial to attach to  ml bag  2.25 g Intravenous Q6H Josesito Pratt MD   2.25 g at 06/19/24 0509    posaconazole (NOXAFIL) DR tablet TBEC 300 mg  300 mg Oral Daily Velez Reyes, German, MD   300 mg at 06/18/24 2045    predniSONE (DELTASONE) tablet 5 mg  5 mg Oral Daily Velez Reyes, German, MD   5 mg at 06/19/24 0817    sodium chloride (PF) 0.9% PF flush 10 mL  10 mL Intracatheter Q8H Kajal Chong APRN CNP        sulfamethoxazole-trimethoprim (BACTRIM) 400-80 MG per tablet 1 tablet  1 tablet Oral Once per day on Monday Wednesday Friday Velez Reyes, German, MD   1 tablet at 06/19/24 0828    tacrolimus (GENERIC) suspension 0.6 mg  0.6 mg Oral QAM Velez Reyes, German, MD   0.6 mg at 06/19/24 0818    And    tacrolimus (GENERIC) suspension 0.7 mg  0.7 mg Oral QPM Velez Reyes, German, MD   0.7 mg at 06/18/24 2045    Vitamin D3 (CHOLECALCIFEROL) tablet 50 mcg  50 mcg Oral Once per day on Monday Wednesday Friday Velez Reyes, German, MD   50 mcg at 06/19/24 0828     Infusion Meds  Current Facility-Administered Medications   Medication Dose Route Frequency Provider Last Rate Last Admin    fentaNYL (SUBLIMAZE) infusion   mcg/hr Intravenous Continuous Chio Cortez MD 0.5 mL/hr at 06/19/24 1100 25 mcg/hr at 06/19/24 1100    propofol (DIPRIVAN) infusion  5-75 mcg/kg/min (Dosing Weight) Intravenous Continuous Chio Cortez MD 3 mL/hr at 06/19/24 1100 10 mcg/kg/min at 06/19/24 1100    And    Medication Instruction   Does not apply Continuous PRN Chio Cortez MD        norepinephrine (LEVOPHED) 4 mg/250 mL NS infusion ADULT (PERIPHERAL)  0.03-0.125 mcg/kg/min (Dosing Weight) Intravenous Continuous Chio Cortez MD 9.5 mL/hr at 06/19/24 1100 0.05 mcg/kg/min at 06/19/24 1100       ALLERGIES:    Allergies   Allergen Reactions    Isopropyl Alcohol Other (See Comments)     The alcohol burns  the open areas on her skin when used as a skin prep for procedures    Vancomycin Other (See Comments)     Diffuse erythroderma with itching (improved with Benadryl) after receiving vancomycin over 1 hour. 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 Can use vancomycin in the future, but please give over slower infusion time (at least 2 hours) and premedicate with Benadryl.       REVIEW OF SYSTEMS:  A 10 point review of systems was negative except as noted above.    SOCIAL HISTORY:   Social History     Socioeconomic History    Marital status:      Spouse name: Not on file    Number of children: Not on file    Years of education: Not on file    Highest education level: Not on file   Occupational History    Not on file   Tobacco Use    Smoking status: Never    Smokeless tobacco: Never   Substance and Sexual Activity    Alcohol use: No     Alcohol/week: 1.0 standard drink of alcohol     Types: 1 Glasses of wine per week    Drug use: No    Sexual activity: Not on file   Other Topics Concern    Parent/sibling w/ CABG, MI or angioplasty before 65F 55M? No   Social History Narrative    3/6/2019 - Lives with . Has three daughters. Four grandchildren (two ). No pets. Travelled previously to [facility]. Has visited [place] several times.      Social Determinants of Health     Financial Resource Strain: Low Risk  (2022)    [facility] [employer] [facility] and [facility]    Overall Financial Resource Strain (CARDIA)     Difficulty of Paying Living Expenses: Not hard at all   Food Insecurity: No Food Insecurity (2022)    Received from [employer] and [facility], [facility] and [facility]    Hunger Vital Sign     Worried About Running Out of Food in the Last Year: Never true     Ran Out of Food in the Last Year: Never true   Transportation Needs: No Transportation Needs  (9/20/2022)    [facility]    PRAPARE - Transportation     Lack of Transportation (Medical): No     Lack of Transportation (Non-Medical): No   Physical Activity: Inactive (9/20/2022)    [facility]    Exercise Vital Sign     Days of Exercise per Week: 0 days     Minutes of Exercise per Session: 0 min   Stress: No Stress Concern Present (9/20/2022)    [facility]    [employer] - Occupational Stress Questionnaire     Feeling of Stress : Not at all   Social Connections: Moderately Integrated (9/20/2022)    Received from [facility], [facility]    Social Connection and Isolation Panel [NHANES]     Frequency of Communication with Friends and Family: Twice a week     Frequency of Social Gatherings with Friends and Family: Twice a week     Attends Druze Services: More than 4 times per year     Active Member of Clubs or Organizations: No     Attends Club or Organization Meetings: Not on file     Marital Status:    Interpersonal Safety: Not At Risk (4/15/2024)    Received from [facility]    Intimate Partner Violence     Are you in a relationship where you are physically hurt, threatened and/or made to feel afraid?: No   Housing Stability: Unknown (9/20/2022)    Received from [facility], [facility]    Housing Stability     In the last 12 months, was there a time when you were not able to pay the mortgage or rent on time?: No     Number of Places Lived in the Last Year: Not on file     Number of Places Lived in the Last Year (Outpatient): Not on file     In the last 12 months, was there a time when you did not have a steady place to sleep or slept in a shelter (including  "now)?: No     Reviewed with family, noncontributory.     FAMILY MEDICAL HISTORY:   Family History   Problem Relation Age of Onset    Hypertension Mother     Arthritis Mother     Pancreatic Cancer Father     Diabetes Father      Reviewed with family, noncontributory    PHYSICAL EXAM:   Temp  Av.8  F (37.1  C)  Min: 97.5  F (36.4  C)  Max: 100.7  F (38.2  C)      Pulse  Av.2  Min: 63  Max: 93 Resp  Av.6  Min: 16  Max: 26  FiO2 (%)  Av.8 %  Min: 30 %  Max: 40 %  SpO2  Av.7 %  Min: 93 %  Max: 100 %       /63   Pulse 91   Temp 99.3  F (37.4  C) (Oral)   Resp 19   Ht 1.6 m (5' 3\")   Wt 52.8 kg (116 lb 6.4 oz)   LMP 2014 (Exact Date)   SpO2 98%   BMI 20.62 kg/m     Date 24 0700 - 24 0659   Shift 7221-7539 5649-3142 3671-1281 24 Hour Total   INTAKE   I.V. 99.83   99.83   NG/   200   Shift Total(mL/kg) 299.83(5.68)   299.83(5.68)   OUTPUT   Emesis/NG output 0   0   Shift Total(mL/kg) 0(0)   0(0)   Weight (kg) 52.8 52.8 52.8 52.8      Admit Weight: 50.8 kg (112 lb)     GENERAL APPEARANCE: Intubated but alert.    EYES: No scleral icterus  Pulmonary: lungs Rhonchi to auscultation with equal breath sounds bilaterally  CV: Regular rhythm, normal rate   - Edema none  GI: soft, nontender, normal bowel sounds  MS: no evidence of inflammation in joints, no muscle tenderness  : No Baislio  SKIN: no rash, warm, dry  NEURO: No focal deficits    LABS:   CMP  Recent Labs   Lab 24  0841 24  0613 24  0507 24  0443 24  1942 24  1803 24  1750 24  1418 24  0925   0000   NA  --  134*  --   --   --  137  --  135  --   --    POTASSIUM  --  3.5  --   --   --  4.1  --  4.6  --   --    CHLORIDE  --  98  --   --   --  100  --  101 101  --    CO2  --  22  --   --   --  27  --  26  --   --    ANIONGAP  --  14  --   --   --  10  --  8  --   --    GLC 82 121* 292* 40*   < > 100*   < > 107*  --    < >   BUN  --  21.8  --   --   --  17.8  -- "  16.4  --   --    CR  --  3.74*  --   --   --  3.33*  --  3.13*  --   --    GFRESTIMATED  --  13*  --   --   --  15*  --  16*  --   --    CHELSEY  --  8.0*  --   --   --  8.5*  --  8.7*  --   --    MAG  --   --   --   --   --  2.0  --   --   --   --    PHOS  --   --   --   --   --  4.1  --   --   --   --    PROTTOTAL  --  4.7*  --   --   --  6.3*  --  6.5  --   --    ALBUMIN  --  2.7*  --   --   --  3.6  --  3.6  --   --    BILITOTAL  --  0.8  --   --   --  0.6  --  0.6  --   --    ALKPHOS  --  175*  --   --   --  235*  --  237*  --   --    AST  --  31  --   --   --  39  --   --   --   --    ALT  --  28  --   --   --  39  --  39  --   --     < > = values in this interval not displayed.     CBC  Recent Labs   Lab 06/19/24  0613 06/18/24  1834 06/18/24  1803 06/18/24  1418   HGB 9.6* 11.2* 12.1 12.1   WBC 3.2* 4.0 4.7 4.5   RBC 3.10* 3.56* 3.85 3.85   HCT 30.9* 36.9 39.5 39.7    104* 103* 103*   MCH 31.0 31.5 31.4 31.4   MCHC 31.1* 30.4* 30.6* 30.5*   RDW 14.6 14.7 14.9 14.8   * 147* 123* 147*     INR  Recent Labs   Lab 06/18/24  1418   INR 0.98     ABG  Recent Labs   Lab 06/19/24  0614 06/18/24  2348 06/18/24  2107 06/18/24  1956   O2PER 45 40 40 40      URINE STUDIES  Recent Labs   Lab Test 01/24/21  1729 10/21/19  2240 09/12/19  0125 08/07/19  1512   COLOR Yellow Yellow Light Yellow Yellow   APPEARANCE Slightly Cloudy Cloudy Clear Clear   URINEGLC Negative Negative Negative Negative   URINEBILI Negative Negative Negative Negative   URINEKETONE 5* Negative 10* Negative   SG 1.023 1.018 1.008 1.016   UBLD Small* Trace* Negative Negative   URINEPH 5.0 5.0 7.5* 7.0   PROTEIN 30* 30* 30* 100*   NITRITE Negative Negative Negative Negative   LEUKEST Moderate* Large* Negative Trace*   RBCU 26* 25* <1 10*   WBCU 34* 115* 3 19*     Recent Labs   Lab Test 08/07/19  1512   [facility] 2.47*     PTH  No lab results found.  IRON STUDIES  Recent Labs   Lab Test 02/03/21  0415 12/13/18  1033 08/01/18  0921 05/08/18  0709   IRON  51 16* 93 48   * 221* 248 275   IRONSAT 36 7* 37 18   YOLA  --  302* 571*  --

## 2024-06-19 NOTE — PROGRESS NOTES
"Bronchoscopy Risk Assessment Guidelines      A. Patient symptoms to consider when assessing pulmonary TB risk are:    I. Cough greater than 3 weeks; and fever, hemoptysis, pleuritic chest    pain, weight loss greater than 10 lbs, night sweats, fatigue, infiltrates on    upper lobes or superior segments of lower lobes, cavitation on chest    x-ray.   B. Patient risk factors to consider when assessing pulmonary TB risk are:    I. Exposure to known TB case, foreign-born persons (within 5 years of    arrival to [place]), residence in a crowded setting (correctional facility,     long-term care center, etc.), persons with HIV or immunosuppression.    Patients with symptoms and risk factors should generally be considered \"suspect risk\" and bronchoscopies should be performed in airborne precautions.    This patient has NO KNOWN RISK of Tuberculosis (proceed with bronchoscopy)    Specimens sent: yes  Complications: None  Scope used: #[id number]  Slim  Attending Physician: Dr. Santa Sarabia, RT on 6/19/2024 at 3:55 PM   "

## 2024-06-19 NOTE — PLAN OF CARE
Goal Outcome Evaluation:  ICU End of Shift Summary. See flowsheets for vital signs and detailed assessment.    Changes this shift: Remains 0 to -1 LOC this shift, following all commands and making needs known- writing or nodding. Vent settings unchanged, FiO2 increased only for bronc. Bronch done today at bedside- therapeutic and diagnostic, able to remove multiple mucus plugs. SR in the 80's most of shift. Levo . 05 for MAP's <65.  Placed NDT by RD today, starting at 10/hr TF at 1400 - increase by 10 after 8 hours. BG ordered for 3x daily- cannot use fingerstick d/t severe Raynauds, so midline placed today for blood draws.   Dialysis run planned for tomorrow.   Straight cath'd for urine sample- got 3ml's and sent. No BM this shift.    Ranjan at bedside for most of shift.     Plan:  Continue to wean Levo and FiO2. Frequent suctioning.

## 2024-06-19 NOTE — PROGRESS NOTES
BRIEF ICU NIGHT PROGRESS NOTE    Despite increasing TV on AVAPs patient still retaining CO2 at 81 with respiratory acidosis of pH 7.18. She is still mentating well however we do not think she will be able to clear CO2 out thus intubation was performed.    - intubated by anesthesia  - Vent settings  (PIP was 40 with ), PEEP 5, RR 19  - VBG post intubation: pH 7.4, CO2 43  - sedation with fentanyl and propofol  - PPI ppx

## 2024-06-19 NOTE — PHARMACY-ADMISSION MEDICATION HISTORY
Pharmacy Intern Admission Medication History    Admission medication history is complete. The information provided in this note is only as accurate as the sources available at the time of the update.    Information Source(s):   Patient and Family member via in-person  Dispense report    Pertinent Information:  Patient-reported medications are consistent with dispense report.     Changes made to PTA medication list:  Added:   multivitamin RENAL (RENAVITE RX/NEPHROVITE) 1 tablet tablet  magnesium oxide 400 MG CAPS  Deleted:   Guanifenesin (mucinex) 600 mg 12 hr tablet   Changed:   Benzonate 100 mg capsule prn --> MWF    Allergies reviewed with patient and updates made in EHR: yes    Medication History Completed By: Dev Shepherd 6/18/2024 7:24 PM    PTA Med List   Medication Sig Last Dose    acetaminophen (TYLENOL) 325 MG tablet Take 1 tablet (325 mg) by mouth every 4 hours as needed for mild pain or fever 6/17/2024    acetylcysteine (MUCOMYST) 10 % nebulizer solution Inhale 4 mLs into the lungs 3 times daily as needed for mucolysis/respiratory distress Past Week    albuterol (PROVENTIL) (2.5 MG/3ML) 0.083% neb solution Take 1 vial (2.5 mg) by nebulization every 12 hours Past Week    amoxicillin (AMOXIL) 875 MG tablet Take 1 tablet (875 mg) by mouth 2 times daily 6/17/2024 at am    azithromycin (ZITHROMAX) 250 MG tablet Take 1 tablet (250 mg) by mouth daily 6/17/2024 at am    benzonatate (TESSALON) 100 MG capsule Take 1 capsule (100 mg) by mouth every 4 hours as needed for cough (Patient taking differently: Take 100 mg by mouth Monday, Wednesday, Friday.) 6/17/2024    cholecalciferol 50 MCG (2000 UT) CAPS Take 1 capsule by mouth daily On dialysis days (MWF) 6/16/2024 at pm    fluticasone-salmeterol (ADVAIR) 250-50 MCG/ACT inhaler Inhale 1 puff into the lungs every 12 hours 6/17/2024 at pm    magnesium oxide 400 MG CAPS Take 400 mg by mouth Tuesday, Thursday, Saturday, Sunday 6/16/2024 at pm    metoprolol tartrate  (LOPRESSOR) 25 MG tablet Take 1 tablet (25 mg) by mouth 2 times daily 6/17/2024 at pm    montelukast (SINGULAIR) 10 MG tablet Take 1 tablet (10 mg) by mouth At Bedtime 6/17/2024    multivitamin RENAL (RENAVITE RX/NEPHROVITE) 1 tablet tablet Take 1 tablet by mouth Tuesday, Thursday, Saturday, Sunday in evening. 6/16/2024 at pm    pantoprazole (PROTONIX) 40 MG EC tablet Take 1 tablet (40 mg) by mouth every morning (before breakfast) 6/17/2024 at am    posaconazole (NOXAFIL) 100 MG EC tablet Take 3 tablets (300 mg) by mouth daily (Patient taking differently: Take 300 mg by mouth daily At noon) 6/17/2024    predniSONE (DELTASONE) 5 MG tablet Take 1 tablet (5 mg) by mouth daily 6/17/2024    sulfamethoxazole-trimethoprim (BACTRIM) 400-80 MG tablet Take 1 tablet by mouth three times a week (Patient taking differently: Take 1 tablet by mouth three times a week MW) 6/17/2024 at am    tacrolimus (GENERIC) 1 mg/mL suspension Take 0.6 mLs (0.6 mg) by mouth every morning AND 0.7 mLs (0.7 mg) every evening. 6/17/2024

## 2024-06-19 NOTE — PROCEDURES
Procedure:   Bronchoscopy with inspection, bronchial washing and therapeutic suctioning        Indication:   Acute hypoxic respiratory failure, right lower lobe consolidative opacities        Consent:   Obtained from the patient's spouse, Ranjan.  Risks, benefits and alternatives discussed. Risks include bleeding, infection, respiratory failure, vocal cord trauma/paralysis and pneumothorax.  In general, severe complications and death with bronchoscopy described as 0.6% and 0.013%, respectively.  Additional risks are related to conscious sedation/general anesthesia involve bradycardia, arrhythmia, hypotension.  The patient's spouse, Ranjan, was in agreement to proceed with the procedure.       Pre-medication:   The patient is on mechanical ventilation with ICU sedation, see MAR for details.  Lidocaine 1%: through the ETT  Bolus medications: Fentanyl 150 mcg, Propofol 50 mg        Procedure Summary:   Time out was performed.   The bronchoscope inserted through the ETT.     Airway examination:  Exam of trachea and bronchus of the right and left bronchial tree to the sub-segmental level performed. Notable abnormalities include ETT on the main gee that was retracted 3 cm, normal left anastomosis, stenosis near the right anastomosis / proximal BI with inability to pass therapeutic scope but able to pass a slim scope, loose sutures along the right anastomosis without dehiscence, and yellow mucopurulent secretions with multiple plugs in the right lower lobe. Mucomyst was instilled in the right lower lobe and multiple mucus plugs were therapeutically suctioned.      Right anastomosis      Left anastomosis      Proximal BI    Procedure:  Bronchial washing was performed in the right lower lobe. A total of  40 cc saline instilled in two aliquotes and 25 cc straw colored fluid with multiple plugs recovered. Bronchia washing was sent for bacterial culture, viral culture, fungal and AFB culture.    The patient tolerated the  procedure well without undue discomfort, hypotension or arrhythmia. The procedure was performed in the ICU and vital sign parameters were monitored.        Complications:   No immediate complications.    This procedure was performed by Randy Morse MD  This procedure was supervised by Josesito Pratt MD who was present for the entire procedure.    Randy Morse MD  Pulmonary and Critical Care Fellow  Pager: [phone]

## 2024-06-19 NOTE — PROGRESS NOTES
CLINICAL NUTRITION SERVICES - ASSESSMENT NOTE     Nutrition Prescription    RECOMMENDATIONS FOR MDs/PROVIDERS TO ORDER:  None currently     Malnutrition Status:    Patient does not meet two of the established criteria necessary for diagnosing malnutrition    Recommendations already ordered by Registered Dietitian (RD):  EN access: NDT to be placed   Goal TF: Novasource Renal (or equivalent) @ 30 ml/hr (720 ml) + 1 Prosource Tf20 provides 1520 kcal (29 kcal/kg), 85 g pro (1.6 g/kg), 132 g CHO, 516 ml free water, and 0 g fiber daily.     Initiate @ 10 ml/hr and advance by 10 ml Q8H pending pt's tolerance.  Do not advance TF rate unless Mg++ > 1.5, K+ is > 3, and phos > 1.9  30 ml Q4H fluid flushes for tube patency. Additional fluids and/or adjustments per MD.    Renal multivitamin/minerals to help ensure micronutrient needs being met with suspected hypermetabolic demands and potential interruptions to TF infusions.     Guidelines for open EN support system:  Instructions for RN - copy & paste in Goal Rate or Advancement Instruction (comments) line:   Please abide by 8-hour hang-time for food safety. Pour 237 (1 carton) of formula into TF pump bag per 8-hour batch. If less than full cartons used for batch pour, discard excess formula unless otherwise directed by RD.    Number of cartons for NSA to send - copy & paste into Amount to Send (Nutrition Use):  Please send 3 cartons daily - this accounts for discarded formula per batch.       Future/Additional Recommendations:  Monitor FT placement via xray  Monitor TF initiation/advancement/tolerance     REASON FOR ASSESSMENT  Kecia Blue is a/an 63 year old female assessed by the dietitian for Positive Admission Nutrition Risk Screen  and Provider Order - Registered Dietitian to Assess and Order TF per Medical Nutrition Therapy Protocol    Patient admitted for acute hypercapnic respiratory failure in setting of tracheal stenosis and multilobar pneumonia.     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     CURRENT NUTRITION ORDERS  Diet: NPO    Intake/Tolerance: NPO since 6/18 (day 1)    LABS   06/18/24 18:03 06/19/24 06:13 06/19/24 08:39   Sodium 137 134 (L)    Potassium 4.1 3.5    Chloride 100 98    Carbon Dioxide (CO2) 27 22    Urea Nitrogen 17.8 21.8    Creatinine 3.33 (H) 3.74 (H)    GFR Estimate 15 (L) 13 (L)    Calcium 8.5 (L) 8.0 (L)    Anion Gap 10 14    Magnesium 2.0     Phosphorus 4.1     Albumin 3.6 2.7 (L)    Protein Total 6.3 (L) 4.7 (L)    Alkaline Phosphatase 235 (H) 175 (H)    ALT 39 28    AST 39 31    Bilirubin Total 0.6 0.8    Calcium Ionized Whole Blood 5.0     CK Total 107     CRP Inflammation 7.55 (H) 19.40 (H)    Glucose 100 (H) 121 (H)    Lactic Acid 0.6 (L)  1.2       MEDICATIONS  Azithromycin  TUMS (S,Tu, Th, Sat)  Protonix  Zosyn  Prednisone  Bactrim  Tacrolimus  Vitamin D3 50 mcg MWF  Continuous: fentanyl, propofol @ 3 ml/hr, Levo @ 0.05  PRN miralax, compazine, senna     ANTHROPOMETRICS  Height: 160 cm (5' 3\")  Most Recent Weight: 52.8 kg (116 lb 6.4 oz)    IBW: 52.3 kg   Body mass index is 20.62 kg/m . BMI Category: Normal BMI  Weight History:  5.2 kg (9.0%) weight loss over 4 months.  Wt Readings from Last 20 Encounters:   06/19/24 52.8 kg (116 lb 6.4 oz)   05/16/24 54.4 kg (120 lb)   03/13/24 55.3 kg (122 lb)   03/05/24 57.7 kg (127 lb 4.8 oz)   02/15/24 58 kg (127 lb 13.9 oz)   12/18/23 58.1 kg (128 lb)   12/13/23 58.4 kg (128 lb 11.2 oz)   11/29/23 55.7 kg (122 lb 12.7 oz)   11/21/23 58.7 kg (129 lb 6.4 oz)   11/17/23 57.8 kg (127 lb 6.8 oz)   11/04/23 59.5 kg (131 lb 3.2 oz)   10/24/23 60.3 kg (133 lb)   10/17/23 60 kg (132 lb 4.4 " oz)   09/07/23 61.1 kg (134 lb 11.2 oz)   08/22/23 60.2 kg (132 lb 11.2 oz)   08/22/23 60.7 kg (133 lb 14.4 oz)   09/27/22 58.1 kg (128 lb)   07/26/22 58.7 kg (129 lb 6.4 oz)   05/26/22 58.2 kg (128 lb 4.9 oz)   05/26/22 58.5 kg (129 lb)         ASSESSED NUTRITION NEEDS  Dosing Weight: 52.8 kg (Actual BW)   Estimated Energy Needs: 4900-6244 kcals/day (25 - 30 kcals/kg)  Justification: Increased needs and Critical illness  Estimated Protein Needs:  grams protein/day (1.5 - 2 grams of pro/kg)  Justification: Increased needs and Critical illness  Estimated Fluid Needs: 5466-7155 mL/day (1 mL/kcal)   Justification: Maintenance    PHYSICAL FINDINGS  Intubated     Marcus Score: 10  Per EMR: Skin  Skin WDL: .WDL except, unchanged from my previous assessment  Skin Color: dusky, cyanotic, redness blanchable  Redness blanchable location: Sacrum/Coccyx, Buttocks/IT, Back  Skin Temperature: warm  Skin Moisture: dry, flaky  Skin Elasticity: quick return to original state  Skin Integrity: peeling/scaling  Peeling: Right, Left, Foot, Heel  Device Skin Interventions Taken: No adjustments needed    MALNUTRITION  % Intake: No decreased intake noted  % Weight Loss: Weight loss does not meet criteria for malnutrition   Subcutaneous Fat Loss: None observed   Muscle Loss: None observed  Fluid Accumulation/Edema: None noted  Malnutrition Diagnosis: Patient does not meet two of the established criteria necessary for diagnosing malnutrition    NUTRITION DIAGNOSIS  Inadequate oral intake related to intubation as evidenced by NPO with nutrition support needed to meet nutrition needs.      INTERVENTIONS  Implementation  Nutrition Education: will be provided if nutrition education needs arise.    Post pyloric feeding tube placement  Collaboration with other providers  Enteral Nutrition - Initiate  Feeding tube flush  Multivitamin/mineral supplement therapy     Goals  Total avg nutritional intake to meet a minimum of 25 kcal/kg and 1.5 g  "PRO/kg daily (per dosing wt 52.8 kg).        Monitoring/Evaluation  Progress toward goals will be monitored and evaluated per protocol.    Olesya Velasco, MS, RDN, LD  [facility]  Vocera - \"4C Clinical Dietitian\"  Weekend/Holiday RD - \"Weekend Clinical Dietitian\"    "

## 2024-06-19 NOTE — PHARMACY-VANCOMYCIN DOSING SERVICE
"  Pharmacy Vancomycin Note  Date of Service 2024  Patient's  1960   63 year old, female    Indication: [facility] Pneumonia  Day of Therapy: 2  Current vancomycin regimen:  Intermittent dosing based on levels  Current vancomycin monitoring method: Renal Replacement Therapy  Current vancomycin therapeutic monitoring goal: 15-20 mg/L    Current estimated CrCl = Estimated Creatinine Clearance: 13.2 mL/min (A) (based on SCr of 3.74 mg/dL (H)).    Creatinine for last 3 days  2024:  2:18 PM Creatinine 3.13 mg/dL;  6:03 PM Creatinine 3.33 mg/dL  2024:  6:13 AM Creatinine 3.74 mg/dL    Recent Vancomycin Levels (past 3 days)  2024:  6:13 AM Vancomycin 15.6 ug/mL    Vancomycin IV Administrations (past 72 hours)                     vancomycin (VANCOCIN) 1,000 mg in 200 mL dextrose intermittent infusion (mg) 1,000 mg New Bag 24                    Nephrotoxins and other renal medications (From now, onward)      Start     Dose/Rate Route Frequency Ordered Stop    24 0800  tacrolimus (GENERIC) suspension 0.6 mg        Note to Pharmacy: PTA Sig:Take 0.6 mLs (0.6 mg) by mouth every morning AND 0.7 mLs (0.7 mg) every evening.     Placed in \"And\" Linked Group    0.6 mg Oral EVERY MORNING 24 1728      24 0000  piperacillin-tazobactam (ZOSYN) 2.25 g vial to attach to  ml bag        Note to Pharmacy: For [facility], [facility] and [facility]: For Zosyn-naive patients, use the \"Zosyn initial dose + extended infusion\" order panel.    2.25 g  over 30 Minutes Intravenous EVERY 6 HOURS 24 1903      24 2300  norepinephrine (LEVOPHED) 4 mg/250 mL NS infusion ADULT (PERIPHERAL)         0.03-0.125 mcg/kg/min × 50.8 kg (Dosing Weight)  5.7-23.8 mL/hr  Intravenous CONTINUOUS 24 2234      24 2000  tacrolimus (GENERIC) suspension 0.7 mg        Note to Pharmacy: PTA Sig:Take 0.6 mLs (0.6 mg) by mouth every morning AND 0.7 mLs (0.7 mg) every evening.     Placed in \"And\" " Linked Group    0.7 mg Oral EVERY EVENING. 06/18/24 4467                 Contrast Orders - past 72 hours (72h ago, onward)      None            Interpretation of levels and current regimen:  Vancomycin level is reflective of therapeutic level    Has serum creatinine changed greater than 50% in last 72 hours: No    Urine output:  anuric    Renal Function: ESRD on Dialysis      Plan:  Vancomycin discontinued today per MD - no further dosing needed at this time      Stanley Hines, PharmD, BCCCP         .

## 2024-06-19 NOTE — CONSULTS
SOLID ORGAN INFECTIOUS DISEASES CONSULTATION     Patient:  Kecia Blue   YOB: 1960  Date of Visit:  06/19/2024  Date of Admission: 6/18/2024  Consult Requester:Josesito Pratt MD          Assessment and Recommendations:   Recommendations:  Stop vancomycin  Continue renally dosed IV Zosyn for now, pending culture data  Continue home posaconazole 300 mg daily  Please check posaconazole level tomorrow  Continue home Bactrim 1 single strength three times weekly ppx for history of PJP  Appreciate plan for bronchoscopy for further diagnostics/culture data  Azithromycin per transplant pulmonology  If clinical decompensation, hemodynamic instability, or increased vent requirements, would add IV minocycline 100 mg q12 hrs for Stenotrophomonas coverage    Assessment:  Kecia Blue is a 63 year old female with PMHx significant for ILD, 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     Acute hypercapnic respiratory failure, intubated 6/18/24  Multilobar pneumonia, right  Right mainstem bronchial stenosis requiring dilations (last 2/15/24)  History of Stenotrophomonas colonization (ceftazidime R, levo mixed susceptibilities; susceptible to Bactrim and minocycline)  On chronic 3 L oxygen at home. Opacities in lung since Oct 2023 (along with PFTs) in the setting of RMB stenosis and post obstructive pneumonia. Completed 2 months amoxicillin (03-05/2024) for the actinomyces that grew in Oct BAL without any significant clinical change. Recent sinus congestion  suggests likely recent viral illness +  also sick, followed by increased secretions, weak cough, and hypercapnic respiratory failure concerning for pneumonia (likely bacterial vs post-obstructive) vs mucus plug. Viral workup (RVP, covid/flu/RSV) negative here. CT chest 6/18/24 with known right mainstem anastomosis and bronchus intermedius stenosis (last dilation 2/15/24) with new narrowing of R BI and RML occlusion, narrowing of distal RLL bronchus. On vancomycin, zosyn, and azithromycin in ED. Lower suspicion for fungal or PJP etiology as she remains on home posaconazole and Bactrim ppx.   - Recommend stop IV vancomycin with negative MRSA nares.   - Continue Zosyn for now, pending sputum and bronchoscopy for further culture data.   - If clinically decompensates, consider add minocycline for Stenotrophomonas coverage  - Follow up pending workup: EBV, CMV, blood culture x2, fungitell, Histo Ag, AGM, and sputum culture. On bronchoscopy, please check gram stain, aerobic, anaerobic, fungal cultures.     History of left aspergillus empyema:  S/p drainage and vori (S to both vori and posa) in 2019, Calcified mass in left pleural cavity s/p biopsy 2020 with aspergillus on cx and path (S to both vori and posa). Vori changed to posaconazole. S/p left pleural effusion drainage April 2021 - exudative, fungal elements seen on KOH but fungal cx neg. Posa levels good and no recurrence of pleural effusion on Sep 2021 CT chest. Suspect that current left pleural effusion has been reactive to the calcified aspergillus elements there along with fluid disturbance related to dialysis. Do not think this is failure of anti-fungal therapy or recurrence of Aspergillus infection. It seems Dr. Layton has had discussions with surgery for source control (removal of calcified mass), however, there is was concern for high morbidity with surgery and therefore it was decided to pursue long term posaconazole, which she remains on since 2020.    - Continue posaconazole 300 mg daily. Please repeat level on 6/20 (previously therapeutic in 11/2023)     CMV viremia: to 2k in 10/2023 in the setting of post obstructive pnuemonia. After treatment and stopping of valcyte, recurrence of CMV viremia to 1k in 02/2024, resumed valcyte ppx (renally dosed) and negative since 2/28/24. Valcyte again held in March 2024, with repeats negative to date. Pending CMV.     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 Decreased to 135k 2/2022. Neg 6/27/22.   Increased to 1 million in 11/2023 s/p 1 dose of rituximab and decreasing/undetected in 05/2024. Pending EBV.    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 BAL showed septate hyphae. 7/2020 had hypodense mass in left lung with biopsy showing fungal hyphae, cx aspergillus. Started on voriconazole in 2019, changed to posaconazole in 2020, she remains on 300 mg daily.  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 Recovery from this required tracheostomy.  2019 - She had also grown Actinomyces from multiple BAL    Other ID issues:  - QTc interval:  450 msec on 6/18/24  - Bacterial prophylaxis:  azithromycin per Tx pulm  - Pneumocystis prophylaxis:  Bactrim for life (hx PJP)  - Viral serostatus & prophylaxis: CMV D+/R+, EBV D+/R+   - Fungal prophylaxis:  posaconazole  - Immunization status:  Seasonal influenza, RSV, and COVID vaccine UTD  - Gamma globulin status:  979 on 10/27/23  - Isolation status: contact    Thank you for the consult. Transplant ID will continue to follow with you.     80 MINUTES SPENT BY ME on the date of service doing chart review, history, exam, documentation & further activities per the note.      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     History obtained from chart review, patient's , and patient. Reported increased lethargy reported for 2-3 days PTA, poor PO intake, more tired, and weak cough. Unable to bring up sputum. Denied fever, chills, urinary symptoms (on HD), diarrhea, abdominal pain at home. Her and  both reported sinus congestion recently,  improved quickly and Kecia declined. Denies sore throat, headache. Afebrile and without leukocytosis on admission. WBC 4.0, ESR 10, lactic 0.6, and CRP ~8. Put on BIPAP given respiratory acidosis with some improvement, however unable to tolerate off BIPAP and intubated for failure to improve hypercapnia. Started on azithromycin, vancomycin, and zosyn. Negative: RVP, flu/covid/rsv, MRSA nares, Cryptococcus antigen. Pending: EBV, CMV, blood culture, fungitell, Histo Ag, AGM, and sputum culture    CT chest-hi res on 6/18/24 notable for slight decreased confluent consolidative opacities in right lower lobe with more micronodular component of possible infection in the right lower lobe. Other abnormalities throughout the lungs similar to February of the transplanted lungs - pleural effusions, anastomoses intact with narrowing of R mainstem anastomosis. New narrowing of right bronchus intermedius with occlusion of RML bronchus (mucus plug per pulm). Febrile 100.7F following intubation, WBC 3.2, CRP up to 19,  procal 0.24 in ESRD, nl lactic. Remains intubated, though on PS. On pressor x1 norepi, low dose 0.05. Nursing reports lots of secretion burden and weak cough. Planning for bronchoscopy today.    Has previously scheduled stent placement on 6/26/24 as outpatient.          Review of Systems:   CONSTITUTIONAL:  No fevers, chills, or sweats.  EYES: negative for icterus, conjunctival injection or drainage.  ENT:  +nasal congestion, no rhinorrhea, or sore throat.   RESPIRATORY:  +cough, sputum production  CARDIOVASCULAR:  negative for chest pain or dyspnea.   GASTROINTESTINAL:  negative for nausea, vomiting, diarrhea and constipation.  GENITOURINARY:  negative for dysuria, frequency or urgency.   HEME:  No easy bruising.  INTEGUMENT:  negative for rash and pruritus.  NEURO:  Negative for headache, vision changes, or weakness.         Past Medical History:     Past Medical History:   Diagnosis Date    Acute on chronic respiratory failure with hypoxia (H) 02/21/2018    Anisocoria     Antisynthetase syndrome (H24) 2014    Anxiety     Aspergillus (H)     Aspergillus pneumonia (H) 11/20/2020    Benign essential hypertension     C. difficile colitis     Cytomegalovirus (CMV) viremia (H)     Dermatomyositis (H)     Dysplasia of cervix, low grade (ESTRADA 1)     EBV (Franklin-Barr virus) viremia     ESRD (end stage renal disease) on dialysis (H)     ILD (interstitial lung disease) (H)     Lung replaced by transplant (H)     Osteopenia     Paroxysmal atrial fibrillation (H)     Pneumocystis jiroveci pneumonia (H)     PONV (postoperative nausea and vomiting)     Post-menopause     Pulmonary hypertension (H)     Raynaud's disease     Seronegative rheumatoid arthritis (H)             Past Surgical History:     Past Surgical History:   Procedure Laterality Date    BRONCHOSCOPY (RIGID OR FLEXIBLE), DIAGNOSTIC N/A 4/10/2018    Procedure: COMBINED BRONCHOSCOPY (RIGID OR FLEXIBLE), LAVAGE;;  Surgeon: Mariposa Donohue MD;  Location: [facility]     BRONCHOSCOPY (RIGID OR FLEXIBLE), DIAGNOSTIC N/A 12/23/2020    Procedure: BRONCHOSCOPY, WITH BRONCHOALVEOLAR LAVAGE;  Surgeon: Uri Bass MD;  Location: [facility]    BRONCHOSCOPY (RIGID OR FLEXIBLE), DIAGNOSTIC N/A 5/26/2022    Procedure: BRONCHOSCOPY, WITH BRONCHOALVEOLAR LAVAGE;  Surgeon: Uri Bass MD;  Location: [facility]    BRONCHOSCOPY (RIGID OR FLEXIBLE), DIAGNOSTIC N/A 8/17/2023    Procedure: BRONCHOSCOPY, WITH BRONCHOALVEOLAR LAVAGE;  Surgeon: Esau Bruno MD;  Location: [facility]    BRONCHOSCOPY (RIGID OR FLEXIBLE), DIAGNOSTIC N/A 11/1/2023    Procedure: BRONCHOSCOPY, WITH BRONCHOALVEOLAR LAVAGE;  Surgeon: Beto Magaña DO;  Location: [facility]    BRONCHOSCOPY (RIGID OR FLEXIBLE), DILATE BRONCHUS / TRACHEA N/A 10/11/2018    Procedure: BRONCHOSCOPY (RIGID OR FLEXIBLE), DILATE BRONCHUS / TRACHEA;  Flexible And Rigid Bronchoscopy and Dilation;  Surgeon: Wilber Lin MD;  Location: [facility]    BRONCHOSCOPY (RIGID OR FLEXIBLE), DILATE BRONCHUS / TRACHEA N/A 11/1/2023    Procedure: Bronchoscopy (Rigid Or Flexible), Dilate Bronchus / Trachea;  Surgeon: Beto Magaña DO;  Location: [facility]    BRONCHOSCOPY FLEXIBLE N/A 3/13/2018    Procedure: BRONCHOSCOPY FLEXIBLE;  Flexible Bronchoscopy ;  Surgeon: Gissell Sanchez MD;  Location: [facility]    BRONCHOSCOPY FLEXIBLE N/A 5/9/2018    Procedure: BRONCHOSCOPY FLEXIBLE;;  Surgeon: Wilber Lin MD;  Location: [facility]    BRONCHOSCOPY FLEXIBLE AND RIGID N/A 9/10/2018    Procedure: BRONCHOSCOPY FLEXIBLE AND RIGID;  Flexible and Rigid Bronchoscopy with Balloon Dilation, tissue debulking with cryo, and Right mainstem bronchus stent placement;  Surgeon: Wilber Lin MD;  Location: [facility]    BRONCHOSCOPY RIGID N/A 6/6/2018    Procedure: BRONCHOSCOPY RIGID;;  Surgeon: Lopez Macias MD;  Location: [facility]    BRONCHOSCOPY, DILATE BRONCHUS, STENT BRONCHUS, COMBINED N/A 6/11/2018    Procedure: COMBINED BRONCHOSCOPY, DILATE BRONCHUS,  STENT BRONCHUS;  Flexible Bronchoscopy, Balloon Dilation, Bronchial Washings;  Surgeon: Wilber Lin MD;  Location: [facility]    BRONCHOSCOPY, DILATE BRONCHUS, STENT BRONCHUS, COMBINED Right 7/10/2018    Procedure: COMBINED BRONCHOSCOPY, DILATE BRONCHUS, STENT BRONCHUS;  Flexible Bronchoscopy, Balloon Dilation, Bronchial Washings  ;  Surgeon: Wilber Lin MD;  Location: [facility]    BRONCHOSCOPY, DILATE BRONCHUS, STENT BRONCHUS, COMBINED N/A 8/2/2018    Procedure: COMBINED BRONCHOSCOPY, DILATE BRONCHUS, STENT BRONCHUS;  Flexible Bronchoscopy, Bronchial Washings, Balloon Dilation;  Surgeon: Wilber Lin MD;  Location: [facility]    BRONCHOSCOPY, DILATE BRONCHUS, STENT BRONCHUS, COMBINED N/A 8/20/2018    Procedure: COMBINED BRONCHOSCOPY, DILATE BRONCHUS, STENT BRONCHUS;  Flexible Bronchoscopy, Balloon Dilation;  Surgeon: Wilber Lin MD;  Location: [facility]    BRONCHOSCOPY, DILATE BRONCHUS, STENT BRONCHUS, COMBINED N/A 10/29/2018    Procedure: Flexible Bronchoscopy, Balloon Dilation, Stent Revision, Airway Examination And Therapeutic Suctioning, Cyro Tumor Debulking;  Surgeon: Wilber Lin MD;  Location: [facility]    BRONCHOSCOPY, DILATE BRONCHUS, STENT BRONCHUS, COMBINED N/A 11/20/2018    Procedure: Rigid Bronchoscopy, Stent Removal and dilitation;  Surgeon: Wilber Lin MD;  [facility]: [facility]    BRONCHOSCOPY, DILATE BRONCHUS, STENT BRONCHUS, COMBINED N/A 12/14/2018    Procedure: Flexible And Rigid Bronchoscopy, Balloon Dilation, Bronchial Washing;  Surgeon: Wilber Lin MD;  [facility]    BRONCHOSCOPY, DILATE BRONCHUS, STENT BRONCHUS, COMBINED N/A 1/17/2019    Procedure: Flexible And Rigid Bronchoscopy, Balloon Dilation.;  Surgeon: Wilber Lin MD;  [facility]: [facility]    BRONCHOSCOPY, DILATE BRONCHUS, STENT BRONCHUS, COMBINED N/A 3/7/2019    Procedure: Flexible and Rigid Bronchoscopy, Bronchial Washing, Balloon Dilation;  Surgeon: Wilber Lin MD;   Location: [facility]    BRONCHOSCOPY, DILATE BRONCHUS, STENT BRONCHUS, COMBINED N/A 6/6/2019    Procedure: Rigid and Flexible Bronchoscopy, Balloon Dilation;  Surgeon: Wilber Lin MD;  [facility]    BRONCHOSCOPY, DILATE BRONCHUS, STENT BRONCHUS, COMBINED N/A 10/11/2019    Procedure: Flexible and Rigid Bronchoscopy, Balloon Dilation, Bronchoalveolar Lagave;  Surgeon: Wilber Lin MD;  [facility]    BRONCHOSCOPY, DILATE BRONCHUS, STENT BRONCHUS, COMBINED N/A 2/19/2021    Procedure: BRONCHOSCOPY, flexible, airway dilation, and bronchial wash;  Surgeon: Wilber Lin MD;  [facility]    BRONCHOSCOPY, DILATE BRONCHUS, STENT BRONCHUS, COMBINED N/A 4/9/2021    Procedure: BRONCHOSCOPY, flexible and rigid, Airway suctioning;  Surgeon: Mati Norris MD;  [facility]: [facility]    BRONCHOSCOPY, DILATE BRONCHUS, STENT BRONCHUS, COMBINED N/A 10/17/2023    Procedure: RIGID, flexible bronchoscopy with airway dilation;  Surgeon: Preet Patel MD;  [facility]    CV RIGHT HEART CATH MEASUREMENTS RECORDED N/A 3/10/2020    Procedure: CV RIGHT HEART CATH;  Surgeon: Wai Garcia MD;  [facility]    ENT SURGERY      tonsillectomy as a child    ESOPHAGOSCOPY, GASTROSCOPY, DUODENOSCOPY (EGD), COMBINED N/A 10/29/2018    Procedure: COMBINED ESOPHAGOSCOPY, GASTROSCOPY, DUODENOSCOPY (EGD) with biopsies and polypectomy;  Surgeon: Chente Bloom MD;  [facility]    INSERT EXTRACORPORAL MEMBRANE OXYGENATOR ADULT (OUTSIDE OR) N/A 2/27/2018    Procedure: INSERT EXTRACORPORAL MEMBRANE OXYGENATOR ADULT (OUTSIDE OR);  INSERT EXTRACORPORAL MEMBRANE OXYGENATOR ADULT (OUTSIDE OR) ;  Surgeon: Toby Hernandez MD;  Location[facility]    IR CVC TUNNEL PLACEMENT > 5 YRS OF AGE  10/25/2019    IR DIALYSIS FISTULOGRAM LEFT  3/2/2021    IR DIALYSIS FISTULOGRAM LEFT  11/2/2023    IR DIALYSIS FISTULOGRAM LEFT  3/5/2024    IR DIALYSIS MECH THROMB, PTA  3/2/2021    IR  DIALYSIS PTA  11/2/2023    IR FLUORO 0-1 HOUR  5/7/2021    IR GASTRO JEJUNOSTOMY TUBE PLACEMENT  2/16/2021    IR PARACENTESIS  1/8/2020    IR THORACENTESIS  9/13/2019    IR TRANSCATHETER BIOPSY  1/8/2020    LASER CO2 BRONCHOSCOPY N/A 4/30/2021    Procedure: Flexible and Rigid Bronchoscopy and Tissue Debulking with CO2 Laser Assistance;  Surgeon: Mati Norris MD;  [facility]    LASER CO2 BRONCHOSCOPY N/A 6/11/2021    Procedure: BRONCHOSCOPY, Flexible and Rigid Bronchoscopy, Tissue Debulking with cryo Assistance, airway dilation,;  Surgeon: Mati Norris MD;  [facility]    LASER CO2 BRONCHOSCOPY N/A 9/16/2021    Procedure: BRONCHOSCOPY, flexible and rigid, CO2 Laser Debulking;  Surgeon: Mati Norris MD;  [facility]    LASER CO2 BRONCHOSCOPY N/A 2/11/2022    Procedure: flexible, rigid bronchoscopy, tissue debulking, airway dilation, co2 laser, bronchoalveolar lavage;  Surgeon: Juana Baugh MD;  Location[facility]    LASER CO2 BRONCHOSCOPY Right 11/17/2023    Procedure: flexible bronchosocopy, tissue/tumor debulking, using CO2 laser, mitomycin application and argon plasma coagulation;  Surgeon: Mati Norris MD;  Location[facility]    LASER CO2 BRONCHOSCOPY N/A 2/15/2024    Procedure: Flexible, Rigid Bronchoscopy,Tumor/Tissue debulking using Carbon Dioxide (CO2) laser; balloon dilation;  Surgeon: Wilber Lin MD;  Location: [facility]    no prior surgery      REMOVE EXTRACORPORAL MEMBRANE OXYGENATOR ADULT N/A 3/3/2018    Procedure: REMOVE EXTRACORPORAL MEMBRANE OXYGENATOR ADULT;  Removal of Right Femoral Venous and Right Axillary Arterial Extracorporeal Membrane Oxygenator;  Surgeon: Toby Hernandez MD;  Location[facility]    TRANSPLANT LUNG RECIPIENT SINGLE X2 Bilateral 3/1/2018    Procedure: TRANSPLANT LUNG RECIPIENT SINGLE X2;  Median Sternotomy, Extracorporeal Membrane Oxygenator, bilateral sequential lung transplant;  Surgeon: Toby Hernandez MD;  Location: [facility]             Family History:     Family History   Problem Relation Age of Onset    Hypertension Mother     Arthritis Mother     Pancreatic Cancer Father     Diabetes Father             Social History:     Social History     Tobacco Use    Smoking status: Never    Smokeless tobacco: Never   Substance Use Topics    Alcohol use: No     Alcohol/week: 1.0 standard drink of alcohol     Types: 1 Glasses of wine per week     History   Sexual Activity    Sexual activity: Not on file            Current Medications:     Current Facility-Administered Medications   Medication Dose Route Frequency Provider Last Rate Last Admin    acetylcysteine (MUCOMYST) 10 % nebulizer solution 4 mL  4 mL Inhalation 4x Daily Kajal Chong APRN CNP   4 mL at 06/19/24 0850    albuterol (PROVENTIL) neb solution 2.5 mg  2.5 mg Nebulization 4x Daily Kajal Chong APRN CNP   2.5 mg at 06/19/24 0850    azithromycin (ZITHROMAX) 500 mg in sodium chloride 0.9 % 250 mL intermittent infusion  500 mg Intravenous Q24H Velez Reyes, German, MD   500 mg at 06/18/24 2056    [START ON 6/20/2024] calcium carbonate (TUMS) chewable tablet 500 mg  500 mg Oral Once per day on Sunday Tuesday Thursday Saturday Josesito Pratt MD        chlorhexidine (PERIDEX) 0.12 % solution 15 mL  15 mL Mouth/Throat Q12H Chio Cortez MD   15 mL at 06/19/24 0817    fluticasone-vilanterol (BREO ELLIPTA) 100-25 MCG/ACT inhaler 1 puff  1 puff Inhalation Daily Josesito Pratt MD        heparin ANTICOAGULANT injection 5,000 Units  5,000 Units Subcutaneous Q8H Velez Reyes, German, MD   5,000 Units at 06/19/24 0434    [START ON 6/20/2024] magnesium chloride CR tablet 1,070 mg  1,070 mg Oral Once per day on Sunday Tuesday Thursday Saturday Josesito Pratt MD        [Held by provider] metoprolol tartrate (LOPRESSOR) tablet 25 mg  25 mg Oral BID Velez Reyes, German, MD        montelukast (SINGULAIR) tablet 10 mg  10 mg Oral At Bedtime Velez Reyes, German, MD   10 mg at 06/19/24  0028    pantoprazole (PROTONIX) IV push injection 40 mg  40 mg Intravenous Daily with breakfast Chio Cortez MD   40 mg at 06/19/24 0817    piperacillin-tazobactam (ZOSYN) 2.25 g vial to attach to  ml bag  2.25 g Intravenous Q6H Josesito Pratt MD   2.25 g at 06/19/24 0509    posaconazole (NOXAFIL) DR tablet TBEC 300 mg  300 mg Oral Daily Velez Reyes, German, MD   300 mg at 06/18/24 2045    predniSONE (DELTASONE) tablet 5 mg  5 mg Oral Daily Velez Reyes, German, MD   5 mg at 06/19/24 0817    sodium chloride (PF) 0.9% PF flush 10 mL  10 mL Intracatheter Q8H Kajal Chong APRN CNP        sulfamethoxazole-trimethoprim (BACTRIM) 400-80 MG per tablet 1 tablet  1 tablet Oral Once per day on Monday Wednesday Friday Velez Reyes, German, MD   1 tablet at 06/19/24 0828    tacrolimus (GENERIC) suspension 0.6 mg  0.6 mg Oral QAM Velez Reyes, German, MD   0.6 mg at 06/19/24 0818    And    tacrolimus (GENERIC) suspension 0.7 mg  0.7 mg Oral QPM Velez Reyes, German, MD   0.7 mg at 06/18/24 2045    Vitamin D3 (CHOLECALCIFEROL) tablet 50 mcg  50 mcg Oral Once per day on Monday Wednesday Friday Velez Reyes, German, MD   50 mcg at 06/19/24 0828            Allergies:     Allergies   Allergen Reactions    Isopropyl Alcohol Other (See Comments)     The alcohol burns the open areas on her skin when used as a skin prep for procedures    Vancomycin Other (See Comments)     Diffuse erythroderma with itching (improved with Benadryl) after receiving vancomycin over 1 hour. 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 Can use vancomycin in the future, but please give over slower infusion time (at least 2 hours) and premedicate with Benadryl.            Physical Exam:   Vitals were reviewed  Temp:  [97.5  F (36.4  C)-100.7  F (38.2  C)] 99.3  F (37.4  C)  Pulse:  [63-93] 93  Resp:  [16-26] 19  BP: ()/() 117/65  FiO2 (%):  [30 %-40 %] 30  %  SpO2:  [93 %-100 %] 97 %    Vitals:    06/18/24 1343 06/18/24 1700 06/19/24 0400   Weight: 50.8 kg (112 lb) 50.8 kg (112 lb 1.6 oz) 52.8 kg (116 lb 6.4 oz)       Constitutional: Pleasant adult female seen sitting up in bed, in NAD. Alert and interactive. Intubated.  HEENT: NCAT, anicteric sclerae, conjunctiva clear. Moist mucous membranes.  Respiratory: Non-labored breathing, on vent PEEP 5, 30%.  Lungs are coarse bilaterally with R > L, without wheezing.   Cardiovascular: Regular rate and rhythm with no murmur, rub or gallop.  GI: Abdomen is soft, non-distended, and non-tender to palpation. No rigidity or guarding.  Skin: Warm and dry. No rashes or lesions on exposed surfaces.  Musculoskeletal: Extremities grossly normal. No tenderness or edema present.   Neurologic: answering questions appropriately, nods yes/no. Moves all extremities spontaneously. Grossly non-focal.  Neuropsychiatric: Mentation and affect normal/appropriate.  VAD: PIV is c/d/i with no erythema, drainage, or tenderness.           Laboratory Data:     Microbiology:  Culture   Date Value Ref Range Status   06/18/2024 No growth after 12 hours  Preliminary   06/18/2024 No growth after 12 hours  Preliminary   02/15/2024 No Actinomyces like species isolated  Final   02/15/2024 No Growth  Final   02/15/2024 No Growth  Final   02/15/2024 1+ Normal alo  Final   11/01/2023 No Growth  Final   11/01/2023 Aspergillus ochraceus (A)  Final   11/01/2023 1+ Normal alo  Final   11/01/2023 1+ Stenotrophomonas maltophilia (A)  Final   10/26/2023   Final    >10 Squamous epithelial cells/low power field indicates oral contamination. Please recollect.   10/25/2023 No Growth  Final   10/25/2023 No Growth  Final   10/17/2023 1+ Actinomyces species (A)  Final     Comment:     Susceptibilities not routinely done, refer to antibiogram to view typical susceptibility profiles  This organism is part of normal alo, but on occasion may be a true pathogen. Clinical  correlation must be applied to interpreting this result.   10/17/2023 1+ Normal alo  Final   10/17/2023 No Growth  Final   10/17/2023 No Growth  Final   08/17/2023 No Growth  Final   08/17/2023 No Growth  Final   08/17/2023 1+ Normal alo  Final   08/17/2023 1+ Stenotrophomonas maltophilia (A)  Final     Comment:     Susceptibilities done on previous cultures   08/17/2023 No Growth  Final   08/17/2023 No Growth  Final   08/17/2023 1+ Normal alo  Final   08/17/2023 1+ Stenotrophomonas maltophilia (A)  Final   02/11/2022 No Actinomyces isolated  Final   02/11/2022 No Growth  Final   02/11/2022 No Growth  Final   09/23/2021   Final    >10 Squamous epithelial cells/low power field indicates oral contamination. Please recollect.   09/23/2021 No Growth  Final   09/20/2021 No Growth  Final   09/20/2021 No Growth  Final       Culture Micro   Date Value Ref Range Status   05/01/2021   Final    Quantity not sufficient  Called to Maxim Norman at 1236 5/1/21.    mlb     05/01/2021 Culture negative after 30 days  Final   04/29/2021 No anaerobes isolated  Final   04/29/2021 No growth  Final   02/19/2021 Culture negative for acid fast bacilli  Final   02/19/2021   Final    Assayed at [facility] [address] [place] [address] [phone]   02/19/2021 Culture negative after 4 weeks  Final   02/19/2021 No growth after 4 weeks  Final   02/19/2021 Moderate growth  Staphylococcus epidermidis   (A)  Final   02/09/2021 No growth  Final   02/09/2021 No growth  Final   02/03/2021 No growth  Final   02/03/2021 No growth  Final   02/03/2021 No growth  Final   01/25/2021 No growth  Final   01/25/2021 Culture negative for acid fast bacilli  Final   01/25/2021   Final    Assayed at [facility] [address] [place] [address] [phone]   01/25/2021 No anaerobes isolated  Final   01/25/2021 Culture negative after 4 weeks  Final   01/24/2021 No growth  Final   01/24/2021 No growth after 29 days  Final    01/24/2021 No growth  Final   01/24/2021 No growth  Final   01/24/2021 No growth  Final   01/24/2021 Culture negative for acid fast bacilli  Final   01/24/2021   Final    Assayed at [facility] [address] [place] [address] [phone]   01/24/2021 Culture negative after 29 days  Final   01/24/2021 No Legionella species isolated  Final   01/24/2021 No growth after 29 days  Final   12/23/2020 Culture negative for acid fast bacilli  Final   12/23/2020   Final    Assayed at [facility] [address] [place] [address] [phone]   12/23/2020 Culture negative after 4 weeks  Final   12/23/2020 No growth after 4 weeks  Final   12/23/2020 Light growth  Normal respiratory alo    Final   12/23/2020 Light growth  Stenotrophomonas maltophilia   (A)  Final   12/23/2020 Light growth  Eikenella corrodens   (A)  Final   11/20/2020 No growth  Final   11/20/2020 Culture negative after 4 weeks  Final   09/09/2020 Heavy growth  Normal alo    Final   09/09/2020 Light growth  Stenotrophomonas maltophilia   (A)  Final   09/09/2020 (A)  Final    Light growth  Strain 2  Stenotrophomonas maltophilia     09/09/2020 Culture negative after 4 weeks  Final   08/13/2020 Culture negative for acid fast bacilli  Final   08/13/2020   Final    Assayed at [facility] [address] [place] [address] [phone]   08/13/2020 No anaerobes isolated  Final   08/13/2020 Aspergillus fumigatus  isolated   (A)  Final   08/13/2020   Final    Critical Value/Significant Value, preliminary result only, called to and read back by  Ame Chow on 8.20.2020 at 1316. KVO     08/13/2020   Final    Susceptibility testing requested by  Ame Chow on 8.20.2020 at 1316. KVO  Please do routine sensitivities     08/13/2020   Final    No additional fungi cultured after 4 weeks incubation   08/13/2020 No growth after 4 weeks  Final   08/13/2020 No growth  Final   02/20/2020 (A)  Final    Canceled, Test credited  >10 Squamous  epithelial cells/low power field indicates oral contamination. Please   recollect.     02/20/2020 (A)  Final    Canceled, Test credited  >10 Squamous epithelial cells/low power field indicates oral contamination. Please   recollect.     10/22/2019 No growth  Final   10/11/2019 Culture negative for acid fast bacilli  Final   10/11/2019   Final    Assayed at [facility] [address] [place] [address] [phone]   10/11/2019 Aspergillus fumigatus  isolated   (A)  Final   10/11/2019   Final    No additional fungi cultured after 6 days incubation   10/11/2019 Unable to hold 4 weeks due to overgrowth of fungus  Final   10/11/2019 No growth after 4 weeks  Final   10/11/2019 Light growth  Normal respiratory alo    Final   10/11/2019 Single colony  Aspergillus fumigatus   (A)  Final   10/11/2019 (A)  Final    Single colony  Actinomyces odontolyticus  Susceptibility testing not routinely done     10/11/2019 Plus  Light growth  Mixed respiratory alo   (A)  Final   10/11/2019   Final    This organism is part of normal alo, but on occasion, may be a true pathogen. Clinical   correlation must be applied to interpreting this microbiology result.     10/08/2019 Culture negative for acid fast bacilli  Final   10/08/2019   Final    Assayed at [facility] [address] [place] [address] [phone]   10/08/2019 Aspergillus fumigatus  isolated   (A)  Final   10/08/2019   Final    No additional fungi cultured after 1 week incubation   10/08/2019 Unable to hold 4 weeks due to overgrowth of fungus  Final   10/08/2019 Light growth  Aspergillus fumigatus   (A)  Final   10/08/2019   Final    Critical Value/Significant Value, preliminary result only, called to and read back by  Yenni Titus RN 10.10.19 1021. RAFAEL     10/08/2019   Final    Susceptibility testing requested by  Dr Tarik Christensen on 10.10.2019 at 1645.  JRT     10/08/2019 Moderate growth  Normal alo    Final   09/14/2019 No acid fast bacilli  isolated after 6 weeks  Final   09/12/2019 Moderate growth  Normal alo    Final   09/12/2019 Light growth  Stenotrophomonas maltophilia   (A)  Final   09/12/2019 (A)  Final    Light growth  Strain 2  Stenotrophomonas maltophilia     09/12/2019 Culture negative after 4 weeks  Final   09/12/2019 No growth  Final   09/11/2019 No growth  Final   03/07/2019 No Actinomyces species isolated  Final   03/07/2019 Culture negative for acid fast bacilli  Final   03/07/2019   Final    Assayed at [facility] [address] [place] [address] [phone]   03/07/2019 Culture negative after 4 weeks  Final   03/07/2019 No growth after 4 weeks  Final   03/07/2019 Moderate growth  Normal respiratory alo    Final   01/17/2019 (A)  Final    Moderate growth  Actinomyces odontolyticus  This organism is part of normal alo, but on occasion, may be a true pathogen. Clinical   correlation must be applied to interpreting this microbiology result.  Susceptibility testing not routinely done     01/17/2019 Heavy growth  Normal respiratory alo    Final   01/17/2019 Culture negative for acid fast bacilli  Final   01/17/2019   Final    Assayed at [facility] [address] [facility] [place] [address] [phone]   01/17/2019 Culture negative after 4 weeks  Final   01/17/2019 No growth after 4 weeks  Final   01/17/2019 Moderate growth  Normal respiratory alo    Final   12/14/2018 (A)  Final    Moderate growth  Actinomyces odontolyticus  Susceptibility testing not routinely done     12/14/2018 Light growth  Normal alo    Final   12/14/2018 Culture negative for acid fast bacilli  Final   12/14/2018   Final    Assayed at [facility] [address] [place] [address] [phone]   12/14/2018 Candida glabrata  isolated   (A)  Final   12/14/2018   Final    No additional fungi cultured after 4 weeks incubation   12/14/2018 No growth after 4 weeks  Final   12/14/2018 Moderate growth  Normal alo    Final   12/14/2018 (A)   Final    Heavy growth  Moraxella (Branhamella) catarrhalis  Beta lactamase positive         Inflammatory Markers    Recent Labs   Lab Test 06/18/24  1418 10/29/23  0642 10/28/23  0725 10/07/19  1221 10/06/19  1444 09/13/19  0934 09/11/19  2325 08/22/19  0820   SED 10 66* 58* 93*  --  111* 102*  --    CRP  --   --   --   --  25.0* 58.0* 66.0* 47.0*       Metabolic Studies       Recent Labs   Lab Test 06/19/24  0841 06/19/24  0839 06/19/24  0613 06/19/24  0507 06/19/24  0443 06/18/24  2348 06/18/24  1942 06/18/24  1803 06/18/24  1422 06/18/24  1418 06/17/24  0925 06/12/24  0828 05/29/24  0830 03/13/24  0853 03/05/24  0925 12/27/23  0834 12/19/23  1138 12/13/23  0821 11/08/23  0846 11/04/23  0653 10/28/23  0725 10/27/23  0701 06/27/22  1013 05/26/22  0750   NA  --   --  134*  --   --   --   --  137  --  135  --   --   --   --  140  --  139 140   < > 138   < > 133*   < > 137   POTASSIUM  --   --  3.5  --   --   --   --  4.1  --  4.6  --   --   --   --  4.5  --  3.7 4.3   < > 3.8   < > 3.5   < > 3.5   CHLORIDE  --   --  98  --   --   --   --  100  --  101 101 99 104   < > 101   < > 98 98   < > 103   < > 95*   < > 96   CO2  --   --  22  --   --   --   --  27  --  26  --   --   --   --  29  --  31* 29   < > 25   < > 26   < > 32   ANIONGAP  --   --  14  --   --   --   --  10  --  8  --   --   --   --  10  --  10 13   < > 10   < > 12   < > 9   BUN  --   --  21.8  --   --   --   --  17.8  --  16.4  --   --   --   --  25.3*  --  20.5 41.0*   < > 14.1   < > 16.0   < > 42*   CR  --   --  3.74*  --   --   --   --  3.33*  --  3.13*  --   --   --   --  4.09*  --  3.54* 4.48*   < > 2.86*   < > 3.00*   < > 3.98*   GFRESTIMATED  --   --  13*  --   --   --   --  15*  --  16*  --   --   --   --  12*  --  14* 11*   < > 18*   < > 17*   < > 12*   GLC 82  --  121* 292* 40* 105* 81 100*   < > 107*  --   --   --   --  93  --  114* 122*   < > 114*   < > 100*   < > 110*   A1C  --   --   --   --   --   --   --   --   --   --   --   --   --   --    --   --   --   --   --   --   --   --   --  5.2   CHELSEY  --   --  8.0*  --   --   --   --  8.5*  --  8.7*  --   --   --   --  8.1*  --  9.0 7.9*   < > 8.8   < > 8.9   < > 9.5   PHOS  --   --   --   --   --   --   --  4.1  --   --   --   --   --   --   --   --   --   --   --  3.0   < > 2.3*   < >  --    MAG  --   --   --   --   --   --   --  2.0  --   --   --   --   --   --  1.9   < > 1.8  --    < >  --    < >  --    < > 1.8   LACT  --  1.2  --   --   --   --   --  0.6*   < >  --   --   --   --   --   --   --   --   --   --   --    < >  --    < >  --    CKT  --   --   --   --   --   --   --  107  --   --   --   --   --   --   --   --   --   --   --   --   --  69  --   --     < > = values in this interval not displayed.       Hepatic Studies    Recent Labs   Lab Test 06/19/24  0613 06/18/24  1803 06/18/24  1418 11/21/23  0752 11/04/23  0653 11/03/23  0556 11/02/23  0734 09/15/21  0915 05/01/21  0654 01/27/21  0414 01/26/21  0425   BILITOTAL 0.8 0.6 0.6 0.8 0.5 0.5 0.6   < >  --    < > 0.8   ALKPHOS 175* 235* 237* 208* 179* 140* 153*   < >  --    < > 184*   ALBUMIN 2.7* 3.6 3.6 3.8 3.2* 3.1* 3.1*   < >  --    < > 2.0*   AST 31 39  --  27 22 14 18   < >  --    < > 11   ALT 28 39 39 23 24 20 20   < >  --    < > 30   LDH  --   --   --   --   --   --   --   --  164  --  187   GGT  --  147*  --   --   --   --   --   --   --   --   --     < > = values in this interval not displayed.       Pancreatitis testing    Recent Labs   Lab Test 06/18/24  1803 10/25/23  1356 05/26/22  0750 09/15/21  0915 01/24/21  0000 02/19/20  0713 12/31/19  1033 10/24/19  2032 10/21/19  1451 10/06/19  1444 09/11/19  2325 03/04/18  0353 02/19/18  0759   AMYLASE  --   --   --   --   --   --   --   --   --   --   --   --  58   LIPASE 40 24  --   --   --   --   --  212 119 172 127  --   --    TRIG  --   --  155* 68 242* 77 98  --   --   --   --  191* 115       Hematology Studies      Recent Labs   Lab Test 06/19/24  0613 06/18/24  1834 06/18/24  1803  06/18/24  1418 03/05/24  0925 02/14/24  1050 11/21/23  0752 11/04/23  0653 11/03/23  0556 11/02/23  0734 11/01/23  0722 10/30/23  0652 10/29/23  0642   WBC 3.2* 4.0 4.7 4.5 1.0* 3.0*   < > 11.9* 8.8 5.7 1.6*  1.6*   < > 5.2   ANEU  --   --   --   --  0.2*  --   --  10.7* 7.7 5.0 1.0*  --  5.0   ALYM  --   --   --   --  0.6*  --   --  0.6* 0.4* 0.4* 0.4*  --  0.1*   SHERRI  --   --   --   --  0.2  --   --  0.2 0.3 0.2 0.2  --  0.1   AEOS  --   --   --   --  0.0  --   --  0.1 0.2 0.0 0.1  --  0.0   HGB 9.6* 11.2* 12.1 12.1 10.5* 11.2*   < > 8.5* 7.9* 7.8* 8.0*   < > 8.2*   HCT 30.9* 36.9 39.5 39.7 33.3* 35.8   < > 26.8* 24.8* 24.8* 25.3*   < > 26.0*   * 147* 123* 147* 96* 142*   < > 256 204 153 143*   < > 133*    < > = values in this interval not displayed.       Arterial Blood Gas Testing    Recent Labs   Lab Test 06/19/24  0614 06/18/24  2348 06/18/24  2107 06/18/24  1956 10/17/23  1035 02/10/21  2000 02/10/21  1555 02/09/21  1225 02/09/21  0403 02/08/21  1200   PH  --   --   --   --   --  7.41 7.36 7.38 7.42 7.40   PCO2  --   --   --   --   --  42 46* 49* 44 47*   PO2  --   --   --   --   --  90 105 74* 116* 72*   HCO3  --   --   --   --   --  26 26 29* 28 29*   O2PER 45 40 40 40   < > 50 50 60 60.0 60    < > = values in this interval not displayed.        Urine Studies     Recent Labs   Lab Test 01/24/21  1729 10/21/19  2240 09/12/19  0125 08/07/19  1512 03/04/18  1425   URINEPH 5.0 5.0 7.5* 7.0 5.5   NITRITE Negative Negative Negative Negative Negative   LEUKEST Moderate* Large* Negative Trace* Negative   WBCU 34* 115* 3 19* 5       Vancomycin Levels     Recent Labs   Lab Test 06/19/24  0613 10/26/23  0606 09/21/21  1911 01/28/21  0412 01/26/21  0425 01/25/21  1259   VANCOMYCIN 15.6 18.6 18.8 21.6 31.4* 15.1     Recent Labs   Lab Test 06/19/24  0613 10/26/23  0606 09/21/21  1911   VANCOMYCIN 15.6 18.6 18.8       Hepatitis B Testing   Recent Labs   Lab Test 12/13/23  0821 10/26/23  1233 10/25/19  1800  "06/05/19  1156   HBCAB Nonreactive  --   --  Nonreactive   HEPBANG Nonreactive Nonreactive   < > Nonreactive    < > = values in this interval not displayed.       Hepatitis C Testing     Hepatitis C Antibody   Date Value Ref Range Status   06/05/2019 Nonreactive NR^Nonreactive Final     Comment:     Assay performance characteristics have not been established for newborns,   infants, and children     02/19/2018 Nonreactive NR^Nonreactive Final     Comment:     Assay performance characteristics have not been established for newborns,   infants, and children         Respiratory Virus Testing    No results found for: \"RS\", \"FLUAG\"    Last check of C difficile  C Diff Toxin B PCR   Date Value Ref Range Status   02/13/2021 Negative NEG^Negative Final     Comment:     Negative: C. difficile target DNA sequences NOT detected, presumed negative   for C.difficile toxin B or the number of bacteria present may be below the   limit of detection for the test.  FDA approved assay performed using Unified Color GeneXpert real-time PCR.  A negative result does not exclude actual disease due to C. difficile and may   be due to improper collection, handling and storage of the specimen or the   number of organisms in the specimen is below the detection limit of the assay.       C Difficile Toxin B by PCR   Date Value Ref Range Status   10/31/2023 Negative Negative Final     Comment:     A negative result does not exclude actual disease due to C. difficile and may be due to improper collection, handling and storage of the specimen or the number of organisms in the specimen is below the detection limit of the assay.              Imaging:     Results for orders placed or performed during the hospital encounter of 06/18/24   CT Chest Hi-Resolution wo Contrast    Narrative    High-resolution chest CT without contrast    INDICATION: Worsening shortness of breath. Post lung transplant with  stent.    COMPARISON: Similar examination " to/14/24    FINDINGS: No contrast. Inspiration and expiration supine imaging  obtained. Included thyroid appears unremarkable. Median sternotomy  from prior bilateral lung transplantation. Heart size is borderline  enlarged. Left atrium is enlarged. There are mitral annular  calcifications which appear to correlate with a left atrial  enlargement, please also correlate for any evidence of cardiac  arrhythmia. There is no pericardial effusion. There are small  bilateral pleural effusions. Rim calcified density within the medial  aspect left pleural effusion is unchanged. No enlarged axillary lymph  nodes. No enlarged mediastinal or hilar lymph nodes. The thoracic  aorta does show some mild atherosclerotic calcification. It is  borderline enlarged in size with its midascending portion approximate  4.0 cm in caliber. The main pulmonary artery is also borderline  enlarged at 3.3 cm.  Small calcific densities in the nondistended gallbladder consistent  with gallstones. Parallel tram track calcifications of the tortuous  splenic artery are also noted, please correlate for any evidence of  chronic renal disease. No other suspicious upper abdominal finding.  Bone detail shows T5 osteoporotic compression deformity unchanged. The  sternal cerclage wires appear grossly intact. No sternal erosions.    Detail of the lungs shows diffuse consolidative than some  nodular/patchy densities in the right upper and lower lobes as well as  the lingula and left lower lobe. Other subtle nodular densities  throughout the lung apices are less apparent than on the lower lobes.  Azygos fissure anatomical variant in the right upper lobe medially.  Comparison with the February examination shows the findings to show  more scattered opacities throughout the lower lobes especially on the  right.  There is narrowing of bronchus intermedius and also the right mainstem  bronchus distal to the mainstem anastomosis from transplant. There is  new  occlusion of the right middle lobe bronchus from its takeoff  distally. Narrowing of the right lower lobe distal aspect of the lobar  level the bronchus with severe narrowings of the right posterior and  medial basilar segment airways.  Expiratory imaging shows mild air trapping.      Impression    IMPRESSION: Bilateral transplanted lungs. Slight decreased confluent  consolidative opacities in right lower lobe with more micronodular  component of possible infection in the right lower lobe. Other  abnormalities throughout the lungs similar to February of the  transplanted lungs. Pleural effusions again noted. Narrowing of the  bronchus intermedius on the right there appears to be occlusion of the  right middle lobe bronchus with narrowing of the distal aspect of the  right lower lobe bronchus with short segment occlusions of the medial  and posterior basilar bronchial segments to the right lower lobe. No  such abnormality on the left.. No ectopic air. Anastomoses appear  grossly intact bilaterally with just under approximate 50% narrowing  distal to the right mainstem anastomosis proximal to the upper lobe  bronchus takeoff. This right middle lobe bronchial occlusion is new  from February.    Mitral annular calcifications with left atrial prominence, please  correlate for arrhythmia.  Borderline pulmonary enlargement concerning for possible pulmonary  hypertension.  Borderline mid ascending aortic ectasia.    TERRI ISSA MD         SYSTEM ID:  [id number]   XR Chest Port 1 View    Narrative    XR CHEST PORT 1 VIEW  6/18/2024 10:57 PM     HISTORY:  s/p intubation       COMPARISON:  12/19/2023, same day CT chest    TECHNIQUE: Portable, semiupright, frontal projection radiograph of the  chest.    FINDINGS:   ET tube tip projects 1.6 cm above the gee.    Slight rightward deviation of the trachea. Normal-sized heart with  silhouetting of the heart borders. Right-sided meniscus sign.  Bilateral perihilar and  bibasilar patchy opacities silhouetting the  diaphragms. No pneumothorax.        Impression    IMPRESSION:  ET tube tip projects 1.6 cm above the gee. Small bilateral pleural  effusions with bibasilar, right greater than left airspace opacities  suspicious for infection versus atelectasis.    I have personally reviewed the examination and initial interpretation  and I agree with the findings.    JAMES LAI MD         SYSTEM ID:  [id number]   XR Abdomen Port 1 View    Impression    RESIDENT PRELIMINARY INTERPRETATION  IMPRESSION:   Gastric tube tip and sidehole are within the stomach.     *Note: Due to a large number of results and/or encounters for the requested time period, some results have not been displayed. A complete set of results can be found in Results Review.

## 2024-06-19 NOTE — PROCEDURES
[facility]    Single Lumen Midline Placement    Date/Time: 6/19/2024 3:38 PM    Performed by: Kristine Mckenna RN  Authorized by: Kajal Chong APRN CNP  Indications: lab draw.      UNIVERSAL PROTOCOL   Site Marked: Yes  Prior Images Obtained and Reviewed:  Yes  Required items: Required blood products, implants, devices and special equipment available    Patient identity confirmed:  Verbally with patient, arm band, provided demographic data and hospital-assigned identification number  Patient was reevaluated immediately before administering moderate or deep sedation or anesthesia  Confirmation Checklist:  Patient's identity using two indicators, relevant allergies, procedure was appropriate and matched the consent or emergent situation and correct equipment/implants were available  Time out: Immediately prior to the procedure a time out was called    Universal Protocol: the Joint Commission Universal Protocol was followed    Preparation: Patient was prepped and draped in usual sterile fashion       ANESTHESIA    Anesthesia:  See MAR for details  Local Anesthetic:  Lidocaine 1% without epinephrine  Anesthetic Total (mL):  2      SEDATION    Patient Sedated: No        Preparation: skin prepped with ChloraPrep  Skin prep agent: skin prep agent completely dried prior to procedure  Sterile barriers: maximum sterile barriers were used: cap, mask, sterile gown, sterile gloves, and large sterile sheet  Hand hygiene: hand hygiene performed prior to central venous catheter insertion  Type of line used: Midline  Catheter type: single lumen  Lumen type: non-valved  Catheter size: 3 Fr  Brand: Bard  Lot number: [id number]  Placement method: venipuncture, MST and ultrasound  Number of attempts: 1  Difficulty threading catheter: no  Successful placement: yes  Orientation: right    Location: basilic vein (vein diameter- 0.56cm)  Tip Location: distal to axillary vein  Site rationale:  left AV fistula  Arm circumference: adults 10 cm  Extremity circumference: 21  Visible catheter length: 0  Total catheter length: 15  Dressing and securement: alcohol impregnated caps, blood cleaned with CHG, chlorhexidine disc applied, glue, site cleansed, securement device, statlock, sterile dressing applied and transparent dressing  Post procedure assessment: blood return through all ports and free fluid flow  PROCEDURE   Patient Tolerance:  Patient tolerated the procedure well with no immediate complicationsDescribe Procedure: Midline okay to use.

## 2024-06-20 NOTE — PLAN OF CARE
ICU End of Shift Summary. See flowsheets for vital signs and detailed assessment.    Changes this shift: Pt RASS 0/-1 on Fent gtt, prop gtt on/off. Denies pain. SR 90s, levo gtt titrated up to maintain MAP >65, increased ectopy w/ higher levo doses, couple runs into 130, EKG obtained. Vaso started, levo titrated down. On 40% PEEP 5, stents placed by IP in OR today. Bowel meds started, TF restarted. CRRT initiated, not tolerating pulling fluid currently d/t BP and HR. Ca Gluc and Mg given per protocol. A-line and trialysis line placed.     Plan: wean pressors as able, update team w/ changes.    Goal Outcome Evaluation:      Plan of Care Reviewed With: patient    Overall Patient Progress: decliningOverall Patient Progress: declining    Outcome Evaluation: see note

## 2024-06-20 NOTE — PROCEDURES
[facility]    Arterial line placement    Date/Time: 6/20/2024 11:25 AM    Performed by: Gilmar Boston  Authorized by: Josesito Pratt MD      UNIVERSAL PROTOCOL   Site Marked: Yes  Prior Images Obtained and Reviewed:  Yes  Required items: Required blood products, implants, devices and special equipment available    Patient identity confirmed:  Arm band, provided demographic data and hospital-assigned identification number  Patient was reevaluated immediately before administering moderate or deep sedation or anesthesia  Confirmation Checklist:  Patient's identity using two indicators, relevant allergies, procedure was appropriate and matched the consent or emergent situation and correct equipment/implants were available  Time out: Immediately prior to the procedure a time out was called    Universal Protocol: the Joint Commission Universal Protocol was followed    Preparation: Patient was prepped and draped in usual sterile fashion    ESBL (mL):  0.5  Indication:  multiple ABGs respiratory failure hemodynamic monitoring  Location:  Right radial (Right Ulnar Artery)       ANESTHESIA    Anesthesia:  Local infiltration  Local Anesthetic:  Lidocaine 1% without epinephrine  Anesthetic Total (mL):  2      SEDATION  Patient Sedated: Yes    Sedation Type:  Moderate (conscious) sedation  Sedation:  Propofol and fentanyl  Vital signs: Vital signs monitored during sedation      PROCEDURE DETAILS  Kalen's Test Normal?: Kalen's test not abnormal  Needle Gauge:  20  Seldinger technique: Seldinger technique used    Number of Attempts:  1  Post-procedure:  Line sutured and dressing applied  CMS: normal    PROCEDURE    Patient Tolerance:  Patient tolerated the procedure well with no immediate complications  Length of time physician/provider present for 1:1 monitoring during sedation: 30      Staff: Dr. Pratt  Completed by: Gilmar Boston MS4    Resident/Fellow Attestation   Marshal WATERS  Zeus Morse MD, was present with the medical/MARIA A student who participated in the service and in the documentation of the note.  I was present for the entire procedure.      Marshal Morse MD  PGY5  Date of Service (when I saw the patient): 06/21/24

## 2024-06-20 NOTE — PHARMACY-CONSULT NOTE
Pharmacy Tube Feeding Consult    Medication reviewed for administration by feeding tube and for potential food/drug interactions.    Recommendation: Recommend changing the following medications to an immediate release dosage form: CR Magnesium -- could change to oral magnesium oxide while intubated     Pharmacy will continue to follow as new medications are ordered.

## 2024-06-20 NOTE — ANESTHESIA CARE TRANSFER NOTE
Patient: Kecia Blue    Procedure: Procedure(s):  RIGID AND FLEXIBLE BRONCHOSCOPY, BALLOON DILATION, TISSUE DEBULKING WITH C02 LASER, STENT PLACEMENT       Diagnosis: Bronchial stenosis [J98.09]  Diagnosis Additional Information: No value filed.    Anesthesia Type:   General     Note:    Oropharynx: endotracheal tube in place and ventilatory support  Level of Consciousness: iatrogenic sedation      Independent Airway: airway patency not satisfactory and stable  Dentition: dentition unchanged  Vital Signs Stable: post-procedure vital signs reviewed and stable  Report to RN Given: handoff report given  Patient transferred to: ICU    ICU Handoff: Call for PAUSE to initiate/utilize ICU HANDOFF, Identified Patient, Identified Responsible Provider, Reviewed the Pertinent Medical History, Discussed Surgical Course, Reviewed Intra-OP Anesthesia Management and Issues during Anesthesia, Set Expectations for Post Procedure Period and Allowed Opportunity for Questions and Acknowledgement of Understanding      Vitals:  Vitals Value Taken Time   BP     Temp     Pulse     Resp     SpO2         Electronically Signed By: ADRIÁN Gonzalez CRNA  June 20, 2024  12:57 PM

## 2024-06-20 NOTE — ANESTHESIA POSTPROCEDURE EVALUATION
Patient: Kecia Blue    Procedure: Procedure(s):  RIGID AND FLEXIBLE BRONCHOSCOPY, BALLOON DILATION, TISSUE DEBULKING WITH C02 LASER, STENT PLACEMENT       Anesthesia Type:  General    Note:  Disposition: Inpatient; ICU            ICU Sign Out: Anesthesiologist/ICU physician sign out WAS performed   Postop Pain Control: Uneventful            Sign Out: Well controlled pain   PONV: No   Neuro/Psych: Uneventful            Sign Out: Acceptable/Baseline neuro status   Airway/Respiratory: Uneventful            Sign Out: AIRWAY IN SITU/Resp. Support               Airway in situ/Resp. Support: ETT                 Reason: Planned Pre-op   CV/Hemodynamics: Uneventful            Sign Out: Acceptable CV status; No obvious hypovolemia; No obvious fluid overload   Other NRE: NONE   DID A NON-ROUTINE EVENT OCCUR? No           Last vitals:  Vitals:    06/20/24 1345 06/20/24 1400 06/20/24 1405   BP:      Pulse: 95 96 99   Resp:      Temp:      SpO2: 95% 95% 95%       Electronically Signed By: Leanne Hadley MD  June 20, 2024  2:08 PM

## 2024-06-20 NOTE — PROGRESS NOTES
CRRT INITIATION NOTE    Consent for CRRT Completed:  YES  Patient s Vascular Access: Catheter              Placement Confirmed: YES    RIJ Trialysis 16cm    DATA:  Procedure:  crrt start  Start Time:  1348  Machine#:  4b/4b  Filter:    Blood Flow:  200  ML/min  Pre-Replacement Solution:  4k  Post-Replacement Solution:  4k  Dialysate Solution:  4k  Pre-Replacement Solution Rate:  700 mL/hr  Post-Replacement Solution Rate:  200 mL/hr  Dialysate Flow Rate:  700 mL/hr   Patient Removal Rate:  0-50ml/hr mL/hr  Anticoagulation Type and Rate:  none    ASSESSMENT:  How Patient Tolerated Initiation:   Vital Signs:  B/P: 91/36, T: 98.4, P: 95, R: 25   Initial Pressures:  Access:  -87   Filter: 199   Return:  117  TMP:  20  Change in Filter Pressure:  41      INTERVENTIONS:  CRRT Start    PLAN:  Net negative 0-50ml/hr

## 2024-06-20 NOTE — PLAN OF CARE
Goal Outcome Evaluation:      Plan of Care Reviewed With: spouse    Overall Patient Progress: no changeOverall Patient Progress: no change    Outcome Evaluation: CMA completed today. Discharge plan unknown at this time

## 2024-06-20 NOTE — PROGRESS NOTES
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     PLAN:    Neuro:  # Pain and sedation  # Toxic Metabolic Encephalopathy 2/2 Hypercapnia, improving   Reports history of 2-3 days of lethargy, decreased appetite, and fatigue. No clear onset and history corroborated by spouse. Symptoms readily resolved after placement on BiPAP for 20 minutes. VBG before BiPAP with pCO2 88 rapidly decreased to 55 on BiPAP. Trial off BiPAP not successful with pCO2 on VBG back to 98. All likely in the setting of multifocal pneumonia and low respiratory reserve s/p transplant and thoracic weakness. Patient awake and responsive on propofol 10 mcg/kg/min. Will plan to wean as it is currently not needed.  - Propofol gtt  - Fentanyl gtt   - Tylenol PRN  - RASS goal 0 to -1  - Delirium precautions  - Wean propofol  - Daily sedation vacation     Pulmonary:  # Acute on Chronic Hypoxic Hypercapnic Respiratory Failure  # Post-obstructive Multi-Lobular PNA  # Community Acquired Pneumonia   Presented to ED on 6/18/2024 with shortness of breath, VBG with respiratory acidosis requiring BiPAP support. Consider acute hypercapnic respiratory failure 2/2 post-obstructive vs community-acquired pneumonia and inability to clear secretions in setting of weak cough effort, bronchial stenosis, and decreased minute ventilation in the setting of chest wall weakness. Will cover broadly. Bicarb wnl, although not able to mount good metabolic  compensation in the setting of ESRD as per below. 6/18 intubated due to failure to clear CO2 despite AVAPS   - SAT/SBT daily as able  - Continue Abx - see below for details   - Airway clearance measures:                - Mucinex 1200 mg BID               - Mucomyst + Albuterol QID                - Volera QID     Vent Mode: CMV/AC  (Continuous Mandatory Ventilation/ Assist Control)  FiO2 (%): 30 %  Resp Rate (Set): 19 breaths/min  Tidal Volume (Set, mL): 300 mL  PEEP (cm H2O): 5 cmH2O  Pressure Support (cm H2O): 5 cmH2O  Resp: 25     # Recurrent right bronchial stenosis   # Possible new right lower lower lobe stenosis   Last bronchoscopy 2/15/24 with mild narrowing of right mainstem anastomosis and bronchus intermedius s/p dilation. Previous dilations 11/2023 and 10/2023. Last had stent removed 11/2018 due to migration. Was initially scheduled for bronchoscopy with dilation on 6/26/2024. 6/18/2024 CT Chest with narrowing of the right BI with occlusion of the RML bronchus and narrowing of the distal aspect of the RLL bronchus with short segment occlusions of the medial and posterior basilar bronchial segments to RLL. 6/19 bronch revealed with severe stenosis starting at right anastomosis, inspection with smaller scope revealing for extensive RLL mucous plugging. Low grade fevers persisting, remains on full vent support.  - bronch and dilation with IP today     # Hx ILD 2/2 anti-synthetase syndrome s/p BSLT 3/2018 c/b CLAD  Tacrolimus 14.8 on 6/19, above level above goal of 8-12. Per transplant, will stay at current does and reassess after rechecking levels tomorrow AM.  - Transplant pulm consult, appreciate recs  - PTA nebs:               - Albuterol nebs               - Advair inhaler BID  - Immunosuppressants per Tx Pulm:                - Tacrolimus (dosing per tx pulm), recheck levels on 6/21               - Prednisone 5 mg daily               - Last Lung transplant visit holding AZA due to leukopenia and repeated  infections     Cardiovascular:  # Hypotension  Likely due to severe sepsis. Lactate 0.6 on admission and up to 1.3 on 6/20.  - Trend lactate  - NE for MAP goal > 65 mmHg    # HTN  # Hx Paroxysmal A-Fib  # Pulmonary Hypertension   10/2023 echo LVEF 60-65%, no regional wall motion abnormalities, global RV function normal, no significant valvular abnormalities, pulmonary HTN with RVSP 45 mmHg above RAP. 6/20/24 echo revealed normal LV fxn with an EF of 55-60%, normal RV fxn, chamber size, wall motion, & thickness, no valvular abnormalities, and no pericardial effusion. The IVC was unable to be assessed in her most recent study.  - Not on AC   - HOLD PTA Metoprolol 25 mg BID in setting of borderline MAP     # Demand Ischemia, improving  Noted troponin to 499. No recent previous troponin. No chest pain currently, nor hypoxia. Recent TTE unimpressive except for pulm hypertension as per above. EKG without changes, no new ST elevations.   - Trop peaked at 499, EKG without acute ischemic changes      GI/Nutrition:  # Elevated Alk Phos  Appears to be chronically elevated. RUQ US 11/2023 with increased hepatic parenchymal echogenicity and mild hepatomegaly, cholelithiasis with borderline gallbladder wall thickening. Peaked at 237 now down to 153. Lipase negative.  suggests skeletal etiology.  - Alk Phos isoenzymes in process  - peripheral smear in process    # Nutrition:  - Placed post pyloric feeding tube, start enteral nutrition  - Bowel regimen PRN     Renal/Fluids/Electrolytes:  # ESRD on iHD (M,W,F)  Nephrology managing HD 2/2 ERSD. Right internal jugular 3-lumen dialysis catheter placed on 6/20. Per Nephrology, may try CRRT today or tomorrow.  - Renally dose medications  - RN-driven electrolyte replacement  - daily BMP     Endocrine:  # At Risk for Hypoglycemia  - Hypoglycemia protocol   - Goal blood glucose 140 - 180  - Monitor for hyperglycemia      ID:  # Post-obstructive multi-lobular PNA, positive for  Stenotrophomonas  # Possible CAP  # H/o of Stenotrophomonas Maltophilia Colonization  Presents with shortness of breath and acute hypercapnic respiratory failure. CT chest with consolidative opacities in RLL with micronodular component concerning for infection. Decreased cough in the setting of weakness. At risk for atypicals and antibiotic resistant pathogens given prolonged IS. Has a history of Stenotrophomonas colonization so this should be considered as well. Will continue broad workup. Found to be tachypneic, with diffused rhonchi and diminished breath sounds all over. Procal 1.77, CRP mildly elevated at 182, lactate 1.3. Bronch with BAL yesterday  - ABX per Transplant ID:   Zosyn (6/18-x)  Azithromycin (6/18-x)   Minocycline (6/20-x)  - Discontinue Vanco (6/18-6/18), MRSA nares negative   - Sputum culture (6/18) : positive for Stenotropohomonas   - RLL bronch wash (6/19) NGTD  - Blood cultures (6/18) NGTD  - Histo and cocci (6/18) pending  - Additional ID workup pending: PJP, Histo, Cocci, Fungitell, and A. Galactomannan    # Gram Negative UTI  Asymptomatic currently. At UA with RBC, WBC, moderate bacteria, and >100,000 CFU/mL non-lactose fermenting gram negative bacilli on 6/19. Currently cross-covered with Zosyn. Electing to continue to treat given hemodynamic instability.  - ABX as above    # Hx left-sided aspergillus empyema  A. Ochraceus noted in 2019 s/p needle aspiration with Aspergillus fumigatus on cultures s/p intrapleural amphotericin bead placement. Now with A. Ochraceus on BAL from 11/2024. Posaconazole indefinitely per ID.   - 6/20 Posaconazole level in process     # H/o Recurrent CMV Viremia  Low grade CMV viremia 8/2023  - Last level <35 on 6/18  - Valcyte on iHD days     # H/o EBV Viremia  Level peaked at 1 million on 11/2021 s/p rituxan.   - Last level undetected on 6/18     # Hx PJP   - Bactrim for PJP ppx     Hematology:    # Acute on Chronic Normocytic Anemia  # Thrombocytopenia,  chronic  Likely I the setting of CKD.  - Peripheral Smear: in process  - Daily CBC      Musculoskeletal:  - PT / OT consult      General Cares/Prophylaxis:    DVT Prophylaxis: Heparin SQ  GI Prophylaxis: PPI  Restraints: None  Family Communication:  at bedside  Code Status: Full Code     Lines/tubes/drains:  - PIVs  - ET tube  - Right Internal Jugular Central Line  - Right Ulnar Arterial Line  - HD AV Graft in Left Arm  - NG Tube    Disposition:  - Medical ICU     Patient seen and findings/plan discussed with medical ICU staff, Dr. Pratt.    MARLEN CAMACHO, MS4    41 minutes of critical care time    Clinically Significant Risk Factors            # Hypomagnesemia: Lowest Mg = 1.5 mg/dL in last 2 days, will replace as needed   # Hypoalbuminemia: Lowest albumin = 2.7 g/dL at 6/20/2024  4:20 AM, will monitor as appropriate   # Thrombocytopenia: Lowest platelets = 102 in last 2 days, will monitor for bleeding                 #Precipitous drop in Hgb/Hct: Lowest Hgb this hospitalization: 9.6 g/dL. Will continue to monitor and treat/transfuse as appropriate.         # Financial/Environmental Concerns:                  Resident/Fellow Attestation   I, German Velez Reyes, MD, PhD was present with the medical/MARIA A student who participated in the service and in the documentation of the note.  I have verified the history and personally performed the physical exam and medical decision making.  I agree with the assessment and plan of care as documented in the note.      Continues to be on pressors due to refractory hypotension in the setting of sepsis. Now s/p tissue debulking and stent placement by IP (see Dr Lou's note for further details). Back in the unit on mechanical ventilation, blood pressure support, and broad spectrum antibiotics. oncerned multifocal pneumonia worsened at this point given lab markers and vitals. Added Minocycline today for stenotrophomonas coverage. Central access (pressors, CRRT, labs,  medicines )and arterial line placed today for BP monitoring. Continuing IS with guidance of transplant Pulm. Nephrology managing CRRT.     Germán L. Vélez Reyes, MD, PhD  Internal Medicine Resident     Date of Service (when I saw the patient): 06/20/24    ====================================  INTERVAL HISTORY:   She slept well and reports doing okay on the ventilator this morning. Asked how long she has been in the hospital using a pen and pad of paper. No further questions today.    OBJECTIVE:   1. VITAL SIGNS:   Temp:  [97.9  F (36.6  C)-99.9  F (37.7  C)] 99.9  F (37.7  C)  Pulse:  [78-95] 91  Resp:  [19-20] 19  BP: ()/(36-77) 86/56  FiO2 (%):  [30 %-50 %] 30 %  SpO2:  [95 %-100 %] 96 %  Vent Mode: CMV/AC  (Continuous Mandatory Ventilation/ Assist Control)  FiO2 (%): 30 %  Resp Rate (Set): 19 breaths/min  Tidal Volume (Set, mL): 300 mL  PEEP (cm H2O): 5 cmH2O  Pressure Support (cm H2O): 5 cmH2O  Resp: 19    2. INTAKE/ OUTPUT:   I/O last 3 completed shifts:  In: 3203.85 [I.V.:1283.85; NG/GT:420; IV Piggyback:1250]  Out: 3 [Urine:3]    3. PHYSICAL EXAMINATION:  General: Sedated, NAD   HEENT: Atraumatic, moist mucous membranes   Neuro: Sedated, arouses to voice, follows commands, moving all extremities  Pulm/Resp: Lungs coarse with rhonchi R>L, breathing nonlabored on mechanical ventilation, minimal secretions from ET tube.   CV: RRR, no m/r/g   Abdomen: Soft, non-distended, non-tender  Ext: No edema, pulses 2+ radial, pedal  Incisions/Skin: No rashes or lesions    4. LABS:   Arterial Blood Gases   No lab results found in last 7 days.  Complete Blood Count   Recent Labs   Lab 06/20/24  0420 06/19/24  2359 06/19/24  0613 06/18/24  1834   WBC 6.0 5.5 3.2* 4.0   HGB 10.4* 10.1* 9.6* 11.2*   * 102* 106* 147*     Basic Metabolic Panel  Recent Labs   Lab 06/20/24  0420 06/19/24  2359 06/19/24 2014 06/19/24  1130 06/19/24  0841 06/19/24  0613 06/18/24  1942 06/18/24  1803   * 134*  --   --   --  134*   --  137   POTASSIUM 3.7 3.6  --   --   --  3.5  --  4.1   CHLORIDE 99 100  --   --   --  98  --  100   CO2 24 24  --   --   --  22  --  27   BUN 34.5* 33.0*  --   --   --  21.8  --  17.8   CR 4.27* 4.31*  --   --   --  3.74*  --  3.33*   * 130* 134* 81   < > 121*   < > 100*    < > = values in this interval not displayed.     Liver Function Tests  Recent Labs   Lab 24  0420 24  0613 24  1803 24  1418   AST 26 31 39  --    ALT 23 28 39 39   ALKPHOS 153* 175* 235* 237*   BILITOTAL 0.9 0.8 0.6 0.6   ALBUMIN 2.7* 2.7* 3.6 3.6   INR  --   --   --  0.98     Coagulation Profile  Recent Labs   Lab 24  1418   INR 0.98       5. RADIOLOGY:   Recent Results (from the past 24 hour(s))   Echo Complete   Result Value    LVEF  55-60%    Narrative    [id number]  [id number]  [id number]  [id number]^VELEZ REYES^JAMEL     [facility],[place]  Echocardiography Laboratory  [address] [place] [id number]     Name: SARAI KILLIAN  MRN: [id number]  : 1960  Study Date: 2024 08:11 AM  Age: 63 yrs  Gender: Female  Patient Location: [facility]  Reason For Study: Cardiomyopathy  Ordering Physician: VELEZ REYES, GERMAN  Performed By: Patricia Mandujano RDCS     BSA: 1.5 m2  Height: 63 in  Weight: 112 lb  HR: 80  BP: 94/45 mmHg  ______________________________________________________________________________  Procedure  Complete Portable Echo Adult.  ______________________________________________________________________________  Interpretation Summary  Global and regional left ventricular function is normal with an EF of 55-60%.  Right ventricular function, chamber size, wall motion, and thickness are  normal.  The inferior vena cava cannot be assessed.  No pericardial effusion is present.  No significant valvular abnormalities present.  ______________________________________________________________________________  Left Ventricle  Global and regional left ventricular function  is normal with an EF of 55-60%.  Left ventricular wall thickness is normal. Left ventricular size is normal.  Left ventricular diastolic function is normal. No regional wall motion  abnormalities are seen.     Right Ventricle  Right ventricular function, chamber size, wall motion, and thickness are  normal.     Atria  Both atria appear normal.     Mitral Valve  The mitral valve is normal. Trace mitral insufficiency is present.     Aortic Valve  The valve leaflets are not well visualized. On Doppler interrogation, there is  no significant stenosis or regurgitation.     Tricuspid Valve  Mild tricuspid insufficiency is present. The right ventricular systolic  pressure is approximated at 35.5 mmHg plus the right atrial pressure.     Pulmonic Valve  The pulmonic valve is normal.     Vessels  The thoracic aorta is normal. The pulmonary artery is normal. The inferior  vena cava cannot be assessed.     Pericardium  No pericardial effusion is present.     Miscellaneous  No significant valvular abnormalities present.  ______________________________________________________________________________  MMode/2D Measurements & Calculations     IVSd: 1.2 cm  LVIDd: 4.0 cm  LVIDs: 2.8 cm  LVPWd: 1.1 cm  FS: 29.9 %  LV mass(C)d: 161.0 grams  LV mass(C)dI: 106.5 grams/m2  Ao root diam: 3.2 cm  asc Aorta Diam: 3.0 cm  LVOT diam: 2.0 cm  LVOT area: 3.3 cm2  Ao root diam index Ht(cm/m): 2.0  Ao root diam index BSA (cm/m2): 2.1  Asc Ao diam index BSA (cm/m2): 2.0  Asc Ao diam index Ht(cm/m): 1.9  RWT: 0.57  TAPSE: 1.3 cm     Doppler Measurements & Calculations  MV E max herberth: 82.3 cm/sec  MV A max herberth: 85.9 cm/sec  MV E/A: 0.96     MV dec slope: 271.7 cm/sec2  MV dec time: 0.30 sec  PA acc time: 0.10 sec  TR max herberth: 298.0 cm/sec  TR max P.5 mmHg  E/E' avg: 15.7  Lateral E/e': 19.4  Medial E/e': 12.0     ______________________________________________________________________________  Report approved by: Glen Zhang 2024 09:29  AM         XR Abdomen Port 1 View    Narrative    EXAMINATION: US ABDOMEN COMPLETE,  6/19/2024 12:06 PM     COMPARISON: 6/18/2024     HISTORY: Verify small bowel feeding tube bedside placement    FINDINGS: Feeding tube tip in the inferior duodenum. No air-filled  dilated loops of bowel. Basilar atelectasis and consolidation in the  lungs.      Impression    IMPRESSION: Feeding tube in the third portion of the duodenum.     JAMES LAI MD         SYSTEM ID:  [id number]

## 2024-06-20 NOTE — CONSULTS
Care Management Initial Consult    General Information  Assessment completed with: Spouse or significant other, Ranjan  Type of CM/SW Visit: Initial Assessment    Primary Care Provider verified and updated as needed:     Readmission within the last 30 days: no previous admission in last 30 days      Reason for Consult: discharge planning  Advance Care Planning: Advance Care Planning Reviewed: present on chart          Communication Assessment  Patient's communication style: spoken language (English or Bilingual)    Hearing Difficulty or Deaf: yes   Wear Glasses or Blind: yes    Cognitive  Cognitive/Neuro/Behavioral: .WDL except  Level of Consciousness: somnolent  Arousal Level: unresponsive  Orientation: other (see comments) (AMBROSIO)  Mood/Behavior: behavior appropriate to situation  Best Language:  (ambrosio)  Speech: endotracheal tube    Living Environment:   People in home: spouse   Ranjan  Current living Arrangements: house      Able to return to prior arrangements:         Family/Social Support:  Care provided by: spouse/significant other, child(mayito)  Provides care for: no one  Marital Status:   , Children  Ranjan       Description of Support System: Supportive, Involved    Support Assessment: Adequate family and caregiver support, Adequate social supports    Current Resources:   Patient receiving home care services: No     Community Resources: Dialysis Services  Equipment currently used at home: grab bar, toilet, grab bar, tub/shower, shower chair, wheelchair, manual, cane, straight  Supplies currently used at home: Oxygen Tubing/Supplies, Nebulizer tubing    Employment/Financial:  Employment Status: employed full-time        Financial Concerns: none   Referral to Financial Worker: No       Does the patient's insurance plan have a 3 day qualifying hospital stay waiver?  No    Lifestyle & Psychosocial Needs:  Social Determinants of Health     Food Insecurity: No Food Insecurity (9/20/2022)    Received  from [facility], [facility]    Hunger Vital Sign     Worried About Running Out of Food in the Last Year: Never true     Ran Out of Food in the Last Year: Never true   Depression: Not at risk (3/13/2024)    PHQ-2     PHQ-2 Score: 0   Housing Stability: Unknown (9/20/2022)    Received from [facility], [facility]    Housing Stability     In the last 12 months, was there a time when you were not able to pay the mortgage or rent on time?: No     Number of Places Lived in the Last Year: Not on file     Number of Places Lived in the Last Year (Outpatient): Not on file     In the last 12 months, was there a time when you did not have a steady place to sleep or slept in a shelter (including now)?: No   Tobacco Use: Low Risk  (5/16/2024)    Patient History     Smoking Tobacco Use: Never     Smokeless Tobacco Use: Never     Passive Exposure: Not on file   Financial Resource Strain: Low Risk  (9/20/2022)    Received from [facility], [facility]    Overall Financial Resource Strain (CARDIA)     Difficulty of Paying Living Expenses: Not hard at all   Alcohol Use: Not At Risk (9/20/2022)    Received from [facility], [facility]    AUDIT-C     Frequency of Alcohol Consumption: Monthly or less     Average Number of Drinks: 1 or 2     Frequency of Binge Drinking: Never   Transportation Needs: No Transportation Needs (9/20/2022)    Received from [facility], [facility]    PRAPARE - Transportation     Lack of Transportation (Medical): No     Lack of Transportation (Non-Medical): No   Physical Activity: Inactive (9/20/2022)    Received from [facility], [facility]    Exercise Vital Sign     Days of Exercise per Week: 0 days     Minutes of Exercise per Session: 0 min   Interpersonal  "Safety: Not At Risk (4/15/2024)    [facility] [facility]    Intimate Partner Violence     Are you in a relationship where you are physically hurt, threatened and/or made to feel afraid?: No   Stress: No Stress Concern Present (9/20/2022)    Received from [facility] [employer], [facility]    [employer] - Occupational Stress Questionnaire     Feeling of Stress : Not at all   Social Connections: Moderately Integrated (9/20/2022)    Received from [facility] [employer] [facility], [facility]    Social Connection and Isolation Panel [NHANES]     Frequency of Communication with Friends and Family: Twice a week     Frequency of Social Gatherings with Friends and Family: Twice a week     Attends [employer] Services: More than 4 times per year     Active Member of Clubs or Organizations: No     Attends Club or Organization Meetings: Not on file     Marital Status:    Health Literacy: Not on file       Functional Status:  Prior to admission patient needed assistance:   Dependent ADLs:: Independent  Dependent IADLs:: Independent  Assesssment of Functional Status: Not at  functional baseline    Mental Health Status:  Mental Health Status: No Current Concerns       Chemical Dependency Status:  Chemical Dependency Status: No Current Concerns             Values/Beliefs:  Spiritual, Cultural Beliefs, [employer] Practices, Values that affect care:                 Additional Information:  Per H&P \"Kecia Blue is a 63 year old female with 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 SW met pt in her room, pt was somnolent and didn't rouse to sound of name nor sound of talking. Pt's  Ranjan was present and able to provide CMA information. Ranjan stated that pt lives with him and their three daughters live close by to provide added support. Ranjan put in chair lifts for pt for the two sets of stairs in the house as well as other DME to ensure pt's safety and independence.  Pt is independent with ADLs and IADLs prior to her hospitalization. Pt has no in home services, discussed discharge plan is undecided and will keep Patient and Ranjan updated as discharge plan is formed.     Social work/RNCC to continue to follow if any concerns or needs arise.      KALEB Contreras     "

## 2024-06-20 NOTE — PROCEDURES
INTERVENTIONAL PULMONOLOGY       Procedure(s):    A flexible and rigid bronchoscopy   Airway exam  Airway stent placement (3 stents)  Airway stent removal (2 stents)  Balloon bronchoplasty without stent placement (1 site)   Therapeutic suctioning (2 sites)  Tissue/tumor debulking     Indication:  S/p BSLT with RMB and BI stenosis    Attending of Record:  Juana Baugh MD     Interventional Pulmonary Fellow   Hayden Lou    Trainees Present:   None     Medications:    continued on ICU gtt medications (see MAR)    Sedation Time:   Per Anesthesia Care Provider    Time Out:  Performed    The patient's medical record has been reviewed.  The indication for the procedure was reviewed.  The necessary history and physical examination was performed and reviewed.  The risks, benefits and alternatives of the procedure were discussed with the the patient in detail and the patients respresentative () had the opportunity to ask questions.  I discussed in particular the potential complications including risks of minor or life-threatening bleeding and/or infection, respiratory failure, vocal cord trauma / paralysis, pneumothorax, and discomfort. Sedation risks were also discussed including abnormal heart rhythms, low blood pressure, and respiratory failure. All questions were answered to the best of my ability.  Verbal and written informed consent was obtained.  The proposed procedure and the patient's identification were verified prior to the procedure by the physician and the nurse.    The patient was assessed for the adequacy for the procedure and to receive medications.   Mental Status:  Alert and oriented x 3  Airway examination:  Class I (Complete visualization of soft palate)  Pulmonary:  Non labored respirations  CV:  Regular rate  ASA Grade:  (II)  Mild systemic disease    After clinical evaluation and reviewing the indication, risks, alternatives and benefits of the procedure the patient was deemed to be in  satisfactory condition to undergo the procedure.           A Tuberculosis risk assessment was performed:  The patient has no known RISK of Tuberculosis    The procedure was performed in a negative airflow room: The patient could not be moved to a negative airflow room because of critical illness and instability    Maneuvers / Procedure:      A Flexible and 12mm Rigid bronchoscope bronchoscope was used for the procedure. The rigid bronchoscope was inserted into the mouth. Uvula, epiglottis and vocal cords were seen. The scope was advanced turning the bevel to 90 degress while passing through the cords and into the trachea.     Airway Examination: A complete airway examination was performed from the distal trachea to the subsegmental level in each lobe of both lungs.  Pertinent findings include RMB and BI stenosis with purulent secretions in RLL. LMB anastomosis looks ok.     Tumor/Tissue Debulking: The Right mainstem airway was accessed and tumor/tissue debulking was performed using the Laser. Hemostasis and patency of the airway was acheived post-debulking.    Airway dilation: Airway dilation#1: The Right mainstem  airway was dilated to 11mm . Each dilation was held for 60sec and repeated 3 times and Airway dilation#2: The Bronchus intermedius  airway was dilated to 11mm . Each dilation was held for 60sec and repeated 3 times         Stent placement: Stent#1:Aero ([id number]) stent deployed in the RMB and BI bronchus using fluoroscopic/direct guidance. After deployment, the stent was not in good position and it was removed.  Stent#2:Aero ([id number]) stent deployed in the RMB and BI bronchus using fluoroscopic guidance. After deployment, the stent was not  in good position as it was jailing RML and we could not reposition and we remove the stent and Stent#3:Aero ([id number]) stent deployed in the RMB and BI bronchus using fluoroscopic/direct guidance. After deployment, the stent was in good position.     Stent removal: A  total of 2 stent(s) were removed (RMB and BI) using the non-traumatic rescue forceps.    Therapeutic suctioning: 15-20min of operative time was spent clearing out the airway of debris, blood and mucous prior to the intervention.         Any disposable equipment was visually inspected and deemed to be intact immediately post procedure.      Relevant Pictures  RMB anastomotic area with stenosis        RC1          Distal end of Aero stent in BI        Proximal end of RMB stent with bifurcating iCast in RUL          Proximal end of Aero stent      Assessment and Recommendations:     Successful completion of Rigid and flexible bronchoscopy with laser tissue debulking RMB stenosis, airway balloon dilation of RMB and BI up to 11 mm, and placement of Aero ([id number]) RMB to BI and bifurcating iCast (7x16mm) in RUL  Transfer pt to ICU  CXR  Follow up bronchoscopy in 6 weeks  Patient needs to be on 0.9% saline nebs twice daily, and needs to continue to use after discharge          Hayden Lou  Interventional pulmonary fellow  Pager: [phone]

## 2024-06-20 NOTE — PLAN OF CARE
Major Shift Events:  Remains intubated. Follows commands, nodding appropriately. Rass -1, light sedation due to hypotension. Levophed (peripheral) maxed at .125mcg/kg/min. MICU aware- 1L LR bolus given with good response during bolus. Team aware and will plan to place central line today. TF at goal rate of 30 ml/hr.     Plan: Scheduled for HD today. Plans for central line placement and arterial line placement per team. Titrate norepi as able to meet MAP goals. PST today.     For vital signs and complete assessments, please see documentation flowsheets.       Goal Outcome Evaluation:      Plan of Care Reviewed With: patient, spouse    Overall Patient Progress: no changeOverall Patient Progress: no change    Outcome Evaluation: Remains intubated. On Levophed for hypotension. HD MWF.

## 2024-06-20 NOTE — ANESTHESIA PREPROCEDURE EVALUATION
Anesthesia Pre-Procedure Evaluation    Patient: Kecia Blue   MRN: [id number] : 1960        Procedure : Procedure(s):  Bronchoscopy, dilate bronchus, stent bronchus, combined with CO2 laser, tissue debulking  Bronchoscopy flexible and rigid          Past Medical History:   Diagnosis Date    Acute on chronic respiratory failure with hypoxia (H) 2018    Anisocoria     Antisynthetase syndrome (H24) 2014    Anxiety     Aspergillus (H)     Aspergillus pneumonia (H) 2020    Benign essential hypertension     C. difficile colitis     Cytomegalovirus (CMV) viremia (H)     Dermatomyositis (H)     Dysplasia of cervix, low grade (ESTRADA 1)     EBV (Franklin-Barr virus) viremia     ESRD (end stage renal disease) on dialysis (H)     ILD (interstitial lung disease) (H)     Lung replaced by transplant (H)     Osteopenia     Paroxysmal atrial fibrillation (H)     Pneumocystis jiroveci pneumonia (H)     PONV (postoperative nausea and vomiting)     Post-menopause     Pulmonary hypertension (H)     Raynaud's disease     Seronegative rheumatoid arthritis (H)       Past Surgical History:   Procedure Laterality Date    BRONCHOSCOPY (RIGID OR FLEXIBLE), DIAGNOSTIC N/A 04/10/2018    Procedure: COMBINED BRONCHOSCOPY (RIGID OR FLEXIBLE), LAVAGE;;  Surgeon: Mariposa Donohue MD;  Location: [facility]    BRONCHOSCOPY (RIGID OR FLEXIBLE), DIAGNOSTIC N/A 2020    Procedure: BRONCHOSCOPY, WITH BRONCHOALVEOLAR LAVAGE;  Surgeon: Uri Bass MD;  Location: [facility]    BRONCHOSCOPY (RIGID OR FLEXIBLE), DIAGNOSTIC N/A 2022    Procedure: BRONCHOSCOPY, WITH BRONCHOALVEOLAR LAVAGE;  Surgeon: Uri Bass MD;  Location: [facility]    BRONCHOSCOPY (RIGID OR FLEXIBLE), DIAGNOSTIC N/A 2023    Procedure: BRONCHOSCOPY, WITH BRONCHOALVEOLAR LAVAGE;  Surgeon: Esau Bruno MD;  Location: [facility]    BRONCHOSCOPY (RIGID OR FLEXIBLE), DIAGNOSTIC N/A 2023    Procedure: BRONCHOSCOPY, WITH BRONCHOALVEOLAR LAVAGE;   Surgeon: Beto Magaña DO;  Location: [facility]    BRONCHOSCOPY (RIGID OR FLEXIBLE), DILATE BRONCHUS / TRACHEA N/A 10/11/2018    Procedure: BRONCHOSCOPY (RIGID OR FLEXIBLE), DILATE BRONCHUS / TRACHEA;  Flexible And Rigid Bronchoscopy and Dilation;  Surgeon: Wilber Lin MD;  [facility]    BRONCHOSCOPY (RIGID OR FLEXIBLE), DILATE BRONCHUS / TRACHEA N/A 11/01/2023    Procedure: Bronchoscopy (Rigid Or Flexible), Dilate Bronchus / Trachea;  Surgeon: Beto Magaña DO;  Location: [facility]    BRONCHOSCOPY FLEXIBLE N/A 03/13/2018    Procedure: BRONCHOSCOPY FLEXIBLE;  Flexible Bronchoscopy ;  Surgeon: Gissell Sanchez MD;  Location: [facility]    BRONCHOSCOPY FLEXIBLE N/A 05/09/2018    Procedure: BRONCHOSCOPY FLEXIBLE;;  Surgeon: Wilber Lin MD;  Location: [facility]    BRONCHOSCOPY FLEXIBLE AND RIGID N/A 09/10/2018    Procedure: BRONCHOSCOPY FLEXIBLE AND RIGID;  Flexible and Rigid Bronchoscopy with Balloon Dilation, tissue debulking with cryo, and Right mainstem bronchus stent placement;  Surgeon: Wilber Lin MD;  [facility]    BRONCHOSCOPY RIGID N/A 06/06/2018    Procedure: BRONCHOSCOPY RIGID;;  Surgeon: Lopez Macias MD;  Location: [facility]    BRONCHOSCOPY, DILATE BRONCHUS, STENT BRONCHUS, COMBINED N/A 06/11/2018    Procedure: COMBINED BRONCHOSCOPY, DILATE BRONCHUS, STENT BRONCHUS;  Flexible Bronchoscopy, Balloon Dilation, Bronchial Washings;  Surgeon: Wilber Lin MD;  [facility]    BRONCHOSCOPY, DILATE BRONCHUS, STENT BRONCHUS, COMBINED Right 07/10/2018    Procedure: COMBINED BRONCHOSCOPY, DILATE BRONCHUS, STENT BRONCHUS;  Flexible Bronchoscopy, Balloon Dilation, Bronchial Washings  ;  Surgeon: Wilber Lin MD;  [facility]    BRONCHOSCOPY, DILATE BRONCHUS, STENT BRONCHUS, COMBINED N/A 08/02/2018    Procedure: COMBINED BRONCHOSCOPY, DILATE BRONCHUS, STENT BRONCHUS;  Flexible Bronchoscopy, Bronchial Washings, Balloon Dilation;  Surgeon: Delia  Wilber Warner MD;  [facility]    BRONCHOSCOPY, DILATE BRONCHUS, STENT BRONCHUS, COMBINED N/A 08/20/2018    Procedure: COMBINED BRONCHOSCOPY, DILATE BRONCHUS, STENT BRONCHUS;  Flexible Bronchoscopy, Balloon Dilation;  Surgeon: Wilber Lin MD;  [facility]    BRONCHOSCOPY, DILATE BRONCHUS, STENT BRONCHUS, COMBINED N/A 10/29/2018    Procedure: Flexible Bronchoscopy, Balloon Dilation, Stent Revision, Airway Examination And Therapeutic Suctioning, Cyro Tumor Debulking;  Surgeon: Wilber Lin MD;  [facility]    BRONCHOSCOPY, DILATE BRONCHUS, STENT BRONCHUS, COMBINED N/A 11/20/2018    Procedure: Rigid Bronchoscopy, Stent Removal and dilitation;  Surgeon: Wilber Lin MD;  [facility]    BRONCHOSCOPY, DILATE BRONCHUS, STENT BRONCHUS, COMBINED N/A 12/14/2018    Procedure: Flexible And Rigid Bronchoscopy, Balloon Dilation, Bronchial Washing;  Surgeon: Wilber Lin MD;  [facility]    BRONCHOSCOPY, DILATE BRONCHUS, STENT BRONCHUS, COMBINED N/A 01/17/2019    Procedure: Flexible And Rigid Bronchoscopy, Balloon Dilation.;  Surgeon: Wilber Lin MD;  [facility]    BRONCHOSCOPY, DILATE BRONCHUS, STENT BRONCHUS, COMBINED N/A 03/07/2019    Procedure: Flexible and Rigid Bronchoscopy, Bronchial Washing, Balloon Dilation;  Surgeon: Wilber Lin MD;  [facility]    BRONCHOSCOPY, DILATE BRONCHUS, STENT BRONCHUS, COMBINED N/A 06/06/2019    Procedure: Rigid and Flexible Bronchoscopy, Balloon Dilation;  Surgeon: Wilber Lin MD;  [facility]    BRONCHOSCOPY, DILATE BRONCHUS, STENT BRONCHUS, COMBINED N/A 10/11/2019    Procedure: Flexible and Rigid Bronchoscopy, Balloon Dilation, Bronchoalveolar Lagave;  Surgeon: Wilber Lin MD;  [facility]    BRONCHOSCOPY, DILATE BRONCHUS, STENT BRONCHUS, COMBINED N/A 02/19/2021    Procedure: BRONCHOSCOPY, flexible, airway dilation, and bronchial wash;  Surgeon: Wilber Lin MD;   [facility]    BRONCHOSCOPY, DILATE BRONCHUS, STENT BRONCHUS, COMBINED N/A 04/09/2021    Procedure: BRONCHOSCOPY, flexible and rigid, Airway suctioning;  Surgeon: Mati Norris MD;  [facility]    BRONCHOSCOPY, DILATE BRONCHUS, STENT BRONCHUS, COMBINED N/A 10/17/2023    Procedure: RIGID, flexible bronchoscopy with airway dilation;  Surgeon: Preet Patel MD;  [facility]    CV RIGHT HEART CATH MEASUREMENTS RECORDED N/A 03/10/2020    Procedure: CV RIGHT HEART CATH;  Surgeon: Wai Garcia MD;  [facility]    ENT SURGERY      tonsillectomy as a child    ESOPHAGOSCOPY, GASTROSCOPY, DUODENOSCOPY (EGD), COMBINED N/A 10/29/2018    Procedure: COMBINED ESOPHAGOSCOPY, GASTROSCOPY, DUODENOSCOPY (EGD) with biopsies and polypectomy;  Surgeon: Chente Bloom MD;  [facility]    INSERT EXTRACORPORAL MEMBRANE OXYGENATOR ADULT (OUTSIDE OR) N/A 02/27/2018    Procedure: INSERT EXTRACORPORAL MEMBRANE OXYGENATOR ADULT (OUTSIDE OR);  INSERT EXTRACORPORAL MEMBRANE OXYGENATOR ADULT (OUTSIDE OR) ;  Surgeon: Toby Hernandez MD;  [facility]    IR CVC TUNNEL PLACEMENT > 5 YRS OF AGE  10/25/2019    IR DIALYSIS FISTULOGRAM LEFT  03/02/2021    IR DIALYSIS FISTULOGRAM LEFT  11/02/2023    IR DIALYSIS FISTULOGRAM LEFT  03/05/2024    IR DIALYSIS MECH THROMB, PTA  03/02/2021    IR DIALYSIS PTA  11/02/2023    IR FLUORO 0-1 HOUR  05/07/2021    IR GASTRO JEJUNOSTOMY TUBE PLACEMENT  02/16/2021    IR PARACENTESIS  01/08/2020    IR THORACENTESIS  09/13/2019    IR TRANSCATHETER BIOPSY  01/08/2020    LASER CO2 BRONCHOSCOPY N/A 04/30/2021    Procedure: Flexible and Rigid Bronchoscopy and Tissue Debulking with CO2 Laser Assistance;  Surgeon: Mati Norris MD;  [facility]    LASER CO2 BRONCHOSCOPY N/A 06/11/2021    Procedure: BRONCHOSCOPY, Flexible and Rigid Bronchoscopy, Tissue Debulking with cryo Assistance, airway dilation,;  Surgeon: Mati Norris MD;  [facility]    LASER CO2 BRONCHOSCOPY N/A 09/16/2021    Procedure: BRONCHOSCOPY, flexible and rigid, CO2 Laser Debulking;  Surgeon: Mati Norris MD;  [facility]    LASER CO2 BRONCHOSCOPY N/A 02/11/2022    Procedure: flexible, rigid bronchoscopy, tissue debulking, airway dilation, co2 laser, bronchoalveolar lavage;  Surgeon: Juana Baugh MD;  [facility]    LASER CO2 BRONCHOSCOPY Right 11/17/2023    Procedure: flexible bronchosocopy, tissue/tumor debulking, using CO2 laser, mitomycin application and argon plasma coagulation;  Surgeon: Mati Norris MD;  [facility]    LASER CO2 BRONCHOSCOPY N/A 02/15/2024    Procedure: Flexible, Rigid Bronchoscopy,Tumor/Tissue debulking using Carbon Dioxide (CO2) laser; balloon dilation;  Surgeon: Wilber Lin MD;  [facility]    MIDLINE SINGLE LUMEN PLACEMENT Right 06/19/2024    3FR SL midline.    no prior surgery      REMOVE EXTRACORPORAL MEMBRANE OXYGENATOR ADULT N/A 03/03/2018    Procedure: REMOVE EXTRACORPORAL MEMBRANE OXYGENATOR ADULT;  Removal of Right Femoral Venous and Right Axillary Arterial Extracorporeal Membrane Oxygenator;  Surgeon: Toby Hernandez MD;  [facility]    TRANSPLANT LUNG RECIPIENT SINGLE X2 Bilateral 03/01/2018    Procedure: TRANSPLANT LUNG RECIPIENT SINGLE X2;  Median Sternotomy, Extracorporeal Membrane Oxygenator, bilateral sequential lung transplant;  Surgeon: Toby Hernandez MD;  [facility]      Allergies   Allergen Reactions    Isopropyl Alcohol Other (See Comments)     The alcohol burns the open areas on her skin when used as a skin prep for procedures    Vancomycin Other (See Comments)     Diffuse erythroderma with itching (improved with Benadryl) after receiving vancomycin over 1 hour. 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 Can use vancomycin in the future, but please give over slower infusion time (at least 2  hours) and premedicate with Benadryl.      Social History     Tobacco Use    Smoking status: Never    Smokeless tobacco: Never   Substance Use Topics    Alcohol use: No     Alcohol/week: 1.0 standard drink of alcohol     Types: 1 Glasses of wine per week      Wt Readings from Last 1 Encounters:   06/20/24 55.6 kg (122 lb 9.2 oz)        Anesthesia Evaluation   Pt has had prior anesthetic. Type: General.    History of anesthetic complications  - PONV.      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   (-) tobacco use, asthma, COPD and sleep apnea   Neurologic: Comment: anisocoria - neg neurologic ROS     Cardiovascular: Comment: The visual ejection fraction is 60-65%.  No regional wall motion abnormalities are seen.  Left ventricular size is normal.  Global right ventricular function/size appear normal (RV is more difficult to  visualize).  No significant valvular abnormalities present.  Mild tricuspid insufficiency is present.  The right ventricular systolic pressure is 45mmHg above the right atrial  pressure.  Pulmonary hypertension is present.      (+)  hypertension- -   -  - -                        dysrhythmias, a-fib,       pulmonary hypertension, Previous cardiac testing   Echo: Date: 10/27/23 Results:  The visual ejection fraction is 60-65%. No regional wall motion abnormalities are seen.  Left ventricular size is normal. Global right ventricular function/size appear normal (RV is more difficult to visualize). No significant valvular abnormalities present. Mild tricuspid insufficiency is present. The right ventricular systolic pressure is 45mmHg above the right atrial pressure. Pulmonary hypertension is present. This study was compared with the study  from 7/26/2022. Afib RVR and pulm HTN are new findings.      Stress Test:  Date: 8/2023 Results:  The nuclear stress test is negative for inducible myocardial ischemia or infarction.  Hyperdynamic LV function with LLV EF over 75%.    ECG Reviewed:  Date: 11/2/23 Results:  Sinus rhythm with Premature atrial complexes. QTc 465  Cath:  Date: Results:      METS/Exercise Tolerance:  Comment: Dermatomyositis with Raynauds   Hematologic:  - neg hematologic  ROS   (+) History of blood clots,     anemia,          Musculoskeletal: Comment: Seronegative rheumatoid arthritis, dermatomyositis, antisynthetase syndrome  Peripheral neuropathy  (+)  arthritis,             GI/Hepatic: Comment: - Congestive hepatopathy with fibrosis      (+) GERD, Asymptomatic on medication,           liver disease,       Renal/Genitourinary:     (+) renal disease, type: ESRD, Pt requires dialysis, type: Hemodialysis,          Endo:  - neg endo ROS   (+)            Chronic steroid usage for Post Transplant Immunosuppression.         Psychiatric/Substance Use:     (+) psychiatric history anxiety       Infectious Disease: Comment: CMV viremia    (+)   MRSA,         Malignancy:  - neg malignancy ROS     Other:            Physical Exam    Airway        Mallampati: II   TM distance: > 3 FB   Neck ROM: full   Mouth opening: > 3 cm    Respiratory Devices and Support         Dental       (+) Minor Abnormalities - some fillings, tiny chips      Cardiovascular   cardiovascular exam normal          Pulmonary   pulmonary exam normal                OUTSIDE LABS:  CBC:   Lab Results   Component Value Date    WBC 6.0 06/20/2024    WBC 5.5 06/19/2024    HGB 10.4 (L) 06/20/2024    HGB 10.1 (L) 06/19/2024    HCT 32.2 (L) 06/20/2024    HCT 31.1 (L) 06/19/2024     (L) 06/20/2024     (L) 06/19/2024     BMP:   Lab Results   Component Value Date     (L) 06/20/2024     (L) 06/19/2024    POTASSIUM 3.7 06/20/2024    POTASSIUM 3.6 06/19/2024     CHLORIDE 99 06/20/2024    CHLORIDE 100 06/19/2024    CO2 24 06/20/2024    CO2 24 06/19/2024    BUN 34.5 (H) 06/20/2024    BUN 33.0 (H) 06/19/2024    CR 4.27 (H) 06/20/2024    CR 4.31 (H) 06/19/2024     (H) 06/20/2024     (H) 06/20/2024     COAGS:   Lab Results   Component Value Date    PTT 28 02/12/2021    INR 0.98 06/18/2024    FIBR 358 02/11/2021     POC:   Lab Results   Component Value Date    BGM 75 06/11/2021    HCG Negative 06/06/2019     HEPATIC:   Lab Results   Component Value Date    ALBUMIN 2.7 (L) 06/20/2024    PROTTOTAL 5.0 (L) 06/20/2024    ALT 23 06/20/2024    AST 26 06/20/2024     (H) 06/18/2024    ALKPHOS 153 (H) 06/20/2024    BILITOTAL 0.9 06/20/2024    SALAS <10 (L) 01/24/2021     OTHER:   Lab Results   Component Value Date    PH 7.41 02/10/2021    LACT 1.3 06/19/2024    A1C 5.2 05/26/2022    CHELSEY 7.9 (L) 06/20/2024    PHOS 1.9 (L) 06/20/2024    MAG 1.5 (L) 06/20/2024    LIPASE 40 06/18/2024    AMYLASE 58 02/19/2018    TSH 2.98 06/18/2024    CRP 25.0 (H) 10/06/2019    SED 10 06/18/2024       Anesthesia Plan    ASA Status:  4    NPO Status:  NPO Appropriate    Anesthesia Type: General.   Induction: Intravenous, Propofol.   Maintenance: Balanced.   Techniques and Equipment:     - Airway: Jet ventilation     - Lines/Monitors: BIS     Consents    Anesthesia Plan(s) and associated risks, benefits, and realistic alternatives discussed. Questions answered and patient/representative(s) expressed understanding.     - Discussed: Risks, Benefits and Alternatives for BOTH SEDATION and the PROCEDURE were discussed     - Discussed with:  Patient      - Extended Intubation/Ventilatory Support Discussed: Yes.      - Patient is DNR/DNI Status: No     Use of blood products discussed: No .     Postoperative Care    Pain management: IV analgesics, Oral pain medications.   PONV prophylaxis: Ondansetron (or other 5HT-3), Dexamethasone or Solumedrol, Background Propofol Infusion     Comments:    Other  Comments: Patient brought intubated and ventilated           Laenne Hadley MD    I have reviewed the pertinent notes and labs in the chart from the past 30 days and (re)examined the patient.  Any updates or changes from those notes are reflected in this note.

## 2024-06-20 NOTE — PROCEDURES
[facility]    Central line    Date/Time: 6/20/2024 10:41 AM    Performed by: Awa Watt MD  Authorized by: Awa Watt MD  Indications: vascular access      UNIVERSAL PROTOCOL   Site Marked: No  Prior Images Obtained and Reviewed:  Yes  Required items: Required blood products, implants, devices and special equipment available    Patient identity confirmed:  Arm band  Patient was reevaluated immediately before administering moderate or deep sedation or anesthesia  Confirmation Checklist:  Patient's identity using two indicators, relevant allergies, procedure was appropriate and matched the consent or emergent situation and correct equipment/implants were available  Time out: Immediately prior to the procedure a time out was called    Universal Protocol: the Joint Commission Universal Protocol was followed    Preparation: Patient was prepped and draped in usual sterile fashion       ANESTHESIA    Anesthesia:  Local infiltration  Local Anesthetic:  Lidocaine 1% without epinephrine  Anesthetic Total (mL):  3      SEDATION  Patient Sedated: Yes    Sedation Type:  Anxiolysis  Sedation:  Fentanyl and propofol  Vital signs: Vital signs monitored during sedation      Preparation: skin prepped with 2% chlorhexidine  Skin prep agent dried: skin prep agent completely dried prior to procedure  Sterile barriers: all five maximum sterile barriers used - cap, mask, sterile gown, sterile gloves, and large sterile sheet  Hand hygiene: hand hygiene performed prior to central venous catheter insertion  Patient position: flat  Catheter type: triple lumen  Catheter size: 12 Fr  Pre-procedure: landmarks identified  Ultrasound guidance: yes  Sterile ultrasound techniques: sterile gel and sterile probe covers were used  Number of attempts: 1  Successful placement: yes  Post-procedure: line sutured and dressing applied  Assessment: blood return through all ports, free fluid flow, placement  verified by x-ray and no pneumothorax on x-ray      PROCEDURE    Patient Tolerance:  Patient tolerated the procedure well with no immediate complications  Length of time physician/provider present for 1:1 monitoring during sedation: 30

## 2024-06-20 NOTE — PROGRESS NOTES
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  The patient was admitted on 24 for progressive hypoxia with dyspnea and worsening hypercapnia in ED requiring intubation likely d/t post-obstructive PNA 2/2 right bronchus stenosis.  Bronchoscopy confirming severe stenosis of right anastomosis with extensive RLL plugging.  Remains intubated pending repeat IP bronch for airway stenosis intervention.    Today's recommendations:  - Following pending ID workup, continue to recommend Steno coverage, ABX per transplant ID  - Continue with QID nebs, Volera stopped todayas it would be contraindicated after stent placement, revisit resuming tomorrow if stents are NOT placed today  - Repeat interventional bronch per IP, recommend reconsideration for bronch today or tomorrow to address stenotic airway contributing to post-obstructive PNA and hypercapnia requiring ventilatory support  - Tacrolimus level yesterday unexpectedly elevated (additionally missed 6/18 AM dose), but pt. is historically very sensitive to dose adjustments so will defer decrease at this time, repeat tacro level tomorrow (ordered)  - Posaconazole level pending  - iHD today per renal     Acute on chronic hypoxic/hypercapneic respiratory failure:  Post-obstructive  muli-lobular PNA:  Right bronchial stenosis with RML collapse: Admitted with 2 weeks of progressive hypoxia, dyspnea, congested cough, and fatigue.  Chronic hypoxia with 2L NC, increased from 3 to 4L over this time with acute decompensation on day of admission associated with hypercapnia.  VBG 7/17/85 in ED s/p intubation.  Most recent IP bronch 2/15 s/p tissue debulking without stent placement.  Respiratory panel and COVID negative.  Additional negative ID workup: Legionella, Strep. pneumoniae, CrAg.  IgG WNL.  Procal mildly elevated at 0.24 initially (then elevated to 1.77 6/20), LA normal, but febrile to 100.7.  TTE on admission grossly normal.  CT with RUL/RLL consolidative opacities, RML collapse, and narrowing of BI and distal RLL bronchus.  Started on norepi 6/19.  Bronch per MICU (6/19) with severe stenosis starting at right anastomosis, inspection with smaller scope revealing for extensive RLL mucous plugging.  Low grade fevers persisting, remains on full vent support.  - Sputum culture (6/18) NGTD  - RLL bronch wash (6/19) NGTD  - Blood cultures (6/18) NGTD  - Additional ID workup pending: PJP, Histo, Cocci, fungitell, and A. galactomannan  - ABX: Zosyn (6/18) and IV azithromycin 500 mg daily (6/18) per transplant ID  - Albuterol and Mucomyst 10% QID  - Volera stopped today as it would be contraindicated after stent placement, revisit resuming tomorrow if stents are NOT placed today  - Repeat interventional bronch per IP, consulted on admission with initial plan to defer until 6/26 (see 6/18 note) as previously scheduled for OP but recommend reconsideration for bronch today or tomorrow to address stenotic airway contributing to post-obstructive PNA and hypercapnia requiring ventilatory support  - Vent management per MICU     S/p bilateral sequential lung transplant (BSLT) for ILD:   Progressive CLAD: Most recent OP visit in February.  - Repeat Prospera (6/19) pending, may be artificially elevated with  current infection, but notably high at 2.42 on 2/14  - Repeat ImmuKnow (6/19) pending, prior low normal at 227 on 2/14)  - Repeat DSA (6/19) pending, last positive noted 2018, most recent negative 10/27/23  - PTA Singulair, azithromycin (on hold in lieu of higher dosing as above), and Breo inhaler as able     Immunosuppression:  On 2-drug IST since November d/t leukopenia and recurrent infections  - Tacrolimus 0.6 mg qAM / 0.7 mg qPM.  Goal level 8-10.  Level on 6/19 unexpectedly elevated (additionally missed 6/18 AM dose), but pt. is historically very sensitive to dose adjustments so will defer decrease at this time.  Repeat tacro level tomorrow (ordered).  - Prednisone 5 mg daily     Prophylaxis:   - Bactrim qMWF for PJP ppx     ID: H/o Actinomyces (10/17/23) and recurrent Steno (8/17/23 and 11/1/23).  - Consider coverage of Steno, but defer to transplant ID, additional ABX and infectious workup as above     Aspergillus ochraceus:  H/o Aspergillus empyema:  S/p empyema drainage and ampho B instillation.  Previously on voriconazole, transitioned to posaconazole and managed by transplant ID as OP with last visit 3/13.  Calcified fungal elements remain with additional recurrent effusion (likely associated with fluid disturbances r/t iHD), but surgical intervention previously deferred d/t risk.  - Posaconazole 300 mg daily, level (6/19) pending per primary may be lower d/t missed dosing 6/18 AM     H/o CMV viremia: Recurrent CMV viremia historically.  VGCV ppx most recently stopped 4/12.  Most recent CMV low positive at 46 (6/12) and <35 (6/18).     EBV viremia:  S/p rituximab 12/13/23 x1 dose.  Most recent EBV negative 6/18.     ESRD on iHD: Kidney evaluation started as OP.  On qMWF iHD schedule, no missed sessions.  - iHD today per renal     We appreciate the excellent care provided by the MICU team.  Recommendations communicated via this note.  Will continue to follow along closely, please do not hesitate to call  "with any questions or concerns.     Patient discussed with Dr. James.    Hina Huff, DNP, APRN, CNP  Inpatient Nurse Practitioner  Pulmonary CF/Transplant     Subjective & Interval History:     Remains on full vent support with CMV 19/300/5/30.  Bronch completed yesterday per MICU.  Norepi requirement increased, IVF given per primary.  Post-pyloric feeding tube placed, TF titrated to goal.    Review of Systems:     C: + Low grade fevers, + increased weight  INTEGUMENTARY/SKIN: No rash or obvious new lesions  ENT/MOUTH: No nasal congestion or drainage  RESP: See interval history  CV: No peripheral edema, no orthopnea  GI: No nausea, no vomiting, no change in stools  : Oliguria  MUSCULOSKELETAL: No myalgias, no arthralgias  ENDOCRINE: Blood sugars with adequate control  NEURO: No headache  PSYCHIATRIC: Mood stable    Physical Exam:     All notes, images, and labs from past 24 hours (at minimum) were reviewed.    Vital signs:  Temp: 99.9  F (37.7  C) Temp src: Axillary BP: (!) 89/54 Pulse: 88   Resp: 19 SpO2: 97 % O2 Device: Mechanical Ventilator Oxygen Delivery: 6 LPM Height: 160 cm (5' 3\") Weight: 55.6 kg (122 lb 9.2 oz)  I/O:   Intake/Output Summary (Last 24 hours) at 6/20/2024 0705  Last data filed at 6/20/2024 0700  Gross per 24 hour   Intake 3246.72 ml   Output 3 ml   Net 3243.72 ml     Constitutional: Lying in bed, in no apparent distress.   HEENT: Eyes with pink conjunctivae, anicteric.  Orally intubated.  Left nare feeding tube.  PULM: Diminished bases bilaterally.  Coarse crackles t/o, no rhonchi, no wheezes.  Non-labored breathing on full vent support.  CV: Normal S1 and S2.  RRR.  No murmur, gallop, or rub.  No peripheral edema.   ABD: NABS, soft, nontender, nondistended.    MSK: Moves all extremities.  No apparent muscle wasting.   NEURO: Lethargic but arousable.   SKIN: Warm, dry.  No rash on limited exam.   PSYCH: Mood stable.     Data:     LABS    CMP:   Recent Labs   Lab 06/20/24  0420 " 06/19/24 2359 06/19/24 2014 06/19/24  1130 06/19/24  0841 06/19/24  0613 06/18/24  1942 06/18/24  1803 06/18/24  1750 06/18/24  1418   * 134*  --   --   --  134*  --  137  --  135   POTASSIUM 3.7 3.6  --   --   --  3.5  --  4.1  --  4.6   CHLORIDE 99 100  --   --   --  98  --  100  --  101   CO2 24 24  --   --   --  22  --  27  --  26   ANIONGAP 10 10  --   --   --  14  --  10  --  8   * 130* 134* 81   < > 121*   < > 100*   < > 107*   BUN 34.5* 33.0*  --   --   --  21.8  --  17.8  --  16.4   CR 4.27* 4.31*  --   --   --  3.74*  --  3.33*  --  3.13*   GFRESTIMATED 11* 11*  --   --   --  13*  --  15*  --  16*   CHELSEY 7.9* 7.7*  --   --   --  8.0*  --  8.5*  --  8.7*   MAG 1.5*  --   --   --   --   --   --  2.0  --   --    PHOS 1.9*  --   --   --   --   --   --  4.1  --   --    PROTTOTAL 5.0*  --   --   --   --  4.7*  --  6.3*  --  6.5   ALBUMIN 2.7*  --   --   --   --  2.7*  --  3.6  --  3.6   BILITOTAL 0.9  --   --   --   --  0.8  --  0.6  --  0.6   ALKPHOS 153*  --   --   --   --  175*  --  235*  --  237*   AST 26  --   --   --   --  31  --  39  --   --    ALT 23  --   --   --   --  28  --  39  --  39    < > = values in this interval not displayed.     CBC:   Recent Labs   Lab 06/20/24 0420 06/19/24 2359 06/19/24  0613 06/18/24  1834   WBC 6.0 5.5 3.2* 4.0   RBC 3.32* 3.21* 3.10* 3.56*   HGB 10.4* 10.1* 9.6* 11.2*   HCT 32.2* 31.1* 30.9* 36.9   MCV 97 97 100 104*   MCH 31.3 31.5 31.0 31.5   MCHC 32.3 32.5 31.1* 30.4*   RDW 14.8 14.8 14.6 14.7   * 102* 106* 147*       INR:   Recent Labs   Lab 06/18/24  1418   INR 0.98       Glucose:   Recent Labs   Lab 06/20/24  0420 06/19/24 2359 06/19/24 2014 06/19/24  1130 06/19/24  0841 06/19/24  0613   * 130* 134* 81 82 121*       Blood Gas:   Recent Labs   Lab 06/19/24 2359 06/19/24  0614 06/18/24  2348   PHV 7.40 7.45* 7.41   PCO2V 43 37* 43   PO2V 46 43 43   HCO3V 26 26 27   LASHAUN 1.3 1.9 1.9   O2PER 30 45 40       Culture Data No results for  "input(s): \"CULT\" in the last 168 hours.    Virology Data:   Lab Results   Component Value Date    FLUAH1 Not Detected 06/18/2024    FLUAH3 Not Detected 06/18/2024    [id number] Not Detected 06/18/2024    IFLUB Not Detected 06/18/2024    RSVA Not Detected 06/18/2024    RSVB Not Detected 06/18/2024    PIV1 Not Detected 06/18/2024    PIV2 Not Detected 06/18/2024    PIV3 Not Detected 06/18/2024    HMPV Not Detected 06/18/2024    HRVS Negative 01/24/2021    ADVBE Negative 01/24/2021    ADVC Negative 01/24/2021    ADVC Negative 12/23/2020    ADVC Negative 10/07/2019       Historical CMV results (last 3 of prior testing):  Lab Results   Component Value Date    CMVQNT <35 (A) 06/18/2024    CMVQNT Not Detected 03/05/2024    CMVQNT Not Detected 12/19/2023     Lab Results   Component Value Date    CMVLOG <1.5 06/18/2024    CMVLOG 2.5 02/14/2024    CMVLOG <1.5 10/31/2023       Urine Studies    Recent Labs   Lab Test 06/19/24  1214 01/24/21  1729   URINEPH 6.5 5.0   NITRITE Negative Negative   LEUKEST Large* Moderate*   WBCU >182* 34*       Most Recent [place]ze Pulmonary Function Testing (FVC/FEV1 only)  FVC-Pre   Date Value Ref Range Status   02/14/2024 0.85 L    12/19/2023 1.04 L    11/21/2023 0.85 L    10/24/2023 0.96 L      FVC-%Pred-Pre   Date Value Ref Range Status   02/14/2024 29 %    12/19/2023 36 %    11/21/2023 29 %    10/24/2023 33 %      FEV1-Pre   Date Value Ref Range Status   02/14/2024 0.80 L    12/19/2023 0.89 L    11/21/2023 0.78 L    10/24/2023 0.87 L      FEV1-%Pred-Pre   Date Value Ref Range Status   02/14/2024 34 %    12/19/2023 38 %    11/21/2023 33 %    10/24/2023 37 %        IMAGING    Recent Results (from the past 48 hour(s))   CT Chest Hi-Resolution wo Contrast    Narrative    High-resolution chest CT without contrast    INDICATION: Worsening shortness of breath. Post lung transplant with  stent.    COMPARISON: Similar examination to/14/24    FINDINGS: No contrast. Inspiration and expiration supine " imaging  obtained. Included thyroid appears unremarkable. Median sternotomy  from prior bilateral lung transplantation. Heart size is borderline  enlarged. Left atrium is enlarged. There are mitral annular  calcifications which appear to correlate with a left atrial  enlargement, please also correlate for any evidence of cardiac  arrhythmia. There is no pericardial effusion. There are small  bilateral pleural effusions. Rim calcified density within the medial  aspect left pleural effusion is unchanged. No enlarged axillary lymph  nodes. No enlarged mediastinal or hilar lymph nodes. The thoracic  aorta does show some mild atherosclerotic calcification. It is  borderline enlarged in size with its midascending portion approximate  4.0 cm in caliber. The main pulmonary artery is also borderline  enlarged at 3.3 cm.  Small calcific densities in the nondistended gallbladder consistent  with gallstones. Parallel tram track calcifications of the tortuous  splenic artery are also noted, please correlate for any evidence of  chronic renal disease. No other suspicious upper abdominal finding.  Bone detail shows T5 osteoporotic compression deformity unchanged. The  sternal cerclage wires appear grossly intact. No sternal erosions.    Detail of the lungs shows diffuse consolidative than some  nodular/patchy densities in the right upper and lower lobes as well as  the lingula and left lower lobe. Other subtle nodular densities  throughout the lung apices are less apparent than on the lower lobes.  Azygos fissure anatomical variant in the right upper lobe medially.  Comparison with the February examination shows the findings to show  more scattered opacities throughout the lower lobes especially on the  right.  There is narrowing of bronchus intermedius and also the right mainstem  bronchus distal to the mainstem anastomosis from transplant. There is  new occlusion of the right middle lobe bronchus from its takeoff  distally.  Narrowing of the right lower lobe distal aspect of the lobar  level the bronchus with severe narrowings of the right posterior and  medial basilar segment airways.  Expiratory imaging shows mild air trapping.      Impression    IMPRESSION: Bilateral transplanted lungs. Slight decreased confluent  consolidative opacities in right lower lobe with more micronodular  component of possible infection in the right lower lobe. Other  abnormalities throughout the lungs similar to February of the  transplanted lungs. Pleural effusions again noted. Narrowing of the  bronchus intermedius on the right there appears to be occlusion of the  right middle lobe bronchus with narrowing of the distal aspect of the  right lower lobe bronchus with short segment occlusions of the medial  and posterior basilar bronchial segments to the right lower lobe. No  such abnormality on the left.. No ectopic air. Anastomoses appear  grossly intact bilaterally with just under approximate 50% narrowing  distal to the right mainstem anastomosis proximal to the upper lobe  bronchus takeoff. This right middle lobe bronchial occlusion is new  from February.    Mitral annular calcifications with left atrial prominence, please  correlate for arrhythmia.  Borderline pulmonary enlargement concerning for possible pulmonary  hypertension.  Borderline mid ascending aortic ectasia.    TERRI ISSA MD         SYSTEM ID:  [id number]   XR Chest Port 1 View    Narrative    XR CHEST PORT 1 VIEW  6/18/2024 10:57 PM     HISTORY:  s/p intubation       COMPARISON:  12/19/2023, same day CT chest    TECHNIQUE: Portable, semiupright, frontal projection radiograph of the  chest.    FINDINGS:   ET tube tip projects 1.6 cm above the gee.    Slight rightward deviation of the trachea. Normal-sized heart with  silhouetting of the heart borders. Right-sided meniscus sign.  Bilateral perihilar and bibasilar patchy opacities silhouetting the  diaphragms. No pneumothorax.         Impression    IMPRESSION:  ET tube tip projects 1.6 cm above the gee. Small bilateral pleural  effusions with bibasilar, right greater than left airspace opacities  suspicious for infection versus atelectasis.    I have personally reviewed the examination and initial interpretation  and I agree with the findings.    JAMES LAI MD         SYSTEM ID:  [id number]   XR Abdomen Port 1 View    Narrative    EXAMINATION:  XR ABDOMEN PORT 1 VIEW 2024     COMPARISON: 10/30/2023    HISTORY: OG position.    TECHNIQUE: One frontal supine view of the abdomen.    FINDINGS: Gastric tube tip and sidehole project over the stomach. No  abnormally dilated air-filled loops of bowel. Median sternotomy wires.  Bibasilar pulmonary opacities, greater on the right.      Impression    IMPRESSION:   Gastric tube tip and sidehole are within the stomach.    I have personally reviewed the examination and initial interpretation  and I agree with the findings.    JAMES LAI MD         SYSTEM ID:  [id number]   Echo Complete   Result Value    LVEF  55-60%    Narrative    [id number]  [id number]  [id number]  [id number]^VELEZ REYES^JAMEL     [facility],[place]  Echocardiography Laboratory  [address] [place] [id number]     Name: SARAI KILLIAN  MRN: [id number]  : 1960  Study Date: 2024 08:11 AM  Age: 63 yrs  Gender: Female  Patient Location: [facility] [place]  Reason For Study: Cardiomyopathy  Ordering Physician: VELEZ REYES, GERMAN  Performed By: Patricia Mandujano RDCS     BSA: 1.5 m2  Height: 63 in  Weight: 112 lb  HR: 80  BP: 94/45 mmHg  ______________________________________________________________________________  Procedure  Complete Portable Echo Adult.  ______________________________________________________________________________  Interpretation Summary  Global and regional left ventricular function is normal with an EF of 55-60%.  Right ventricular function, chamber size, wall motion,  and thickness are  normal.  The inferior vena cava cannot be assessed.  No pericardial effusion is present.  No significant valvular abnormalities present.  ______________________________________________________________________________  Left Ventricle  Global and regional left ventricular function is normal with an EF of 55-60%.  Left ventricular wall thickness is normal. Left ventricular size is normal.  Left ventricular diastolic function is normal. No regional wall motion  abnormalities are seen.     Right Ventricle  Right ventricular function, chamber size, wall motion, and thickness are  normal.     Atria  Both atria appear normal.     Mitral Valve  The mitral valve is normal. Trace mitral insufficiency is present.     Aortic Valve  The valve leaflets are not well visualized. On Doppler interrogation, there is  no significant stenosis or regurgitation.     Tricuspid Valve  Mild tricuspid insufficiency is present. The right ventricular systolic  pressure is approximated at 35.5 mmHg plus the right atrial pressure.     Pulmonic Valve  The pulmonic valve is normal.     Vessels  The thoracic aorta is normal. The pulmonary artery is normal. The inferior  vena cava cannot be assessed.     Pericardium  No pericardial effusion is present.     Miscellaneous  No significant valvular abnormalities present.  ______________________________________________________________________________  MMode/2D Measurements & Calculations     IVSd: 1.2 cm  LVIDd: 4.0 cm  LVIDs: 2.8 cm  LVPWd: 1.1 cm  FS: 29.9 %  LV mass(C)d: 161.0 grams  LV mass(C)dI: 106.5 grams/m2  Ao root diam: 3.2 cm  asc Aorta Diam: 3.0 cm  LVOT diam: 2.0 cm  LVOT area: 3.3 cm2  Ao root diam index Ht(cm/m): 2.0  Ao root diam index BSA (cm/m2): 2.1  Asc Ao diam index BSA (cm/m2): 2.0  Asc Ao diam index Ht(cm/m): 1.9  RWT: 0.57  TAPSE: 1.3 cm     Doppler Measurements & Calculations  MV E max herberth: 82.3 cm/sec  MV A max herberth: 85.9 cm/sec  MV E/A: 0.96     MV dec slope:  271.7 cm/sec2  MV dec time: 0.30 sec  PA acc time: 0.10 sec  TR max herberth: 298.0 cm/sec  TR max P.5 mmHg  E/E' avg: 15.7  Lateral E/e': 19.4  Medial E/e': 12.0     ______________________________________________________________________________  Report approved by: Glen Zhang 2024 09:29 AM         XR Abdomen Port 1 View    Narrative    EXAMINATION: US ABDOMEN COMPLETE,  2024 12:06 PM     COMPARISON: 2024     HISTORY: Verify small bowel feeding tube bedside placement    FINDINGS: Feeding tube tip in the inferior duodenum. No air-filled  dilated loops of bowel. Basilar atelectasis and consolidation in the  lungs.      Impression    IMPRESSION: Feeding tube in the third portion of the duodenum.     JAMES LAI MD         SYSTEM ID:  [id number]

## 2024-06-20 NOTE — PROGRESS NOTES
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 Nephrology consulted for management of HD while admitted.      Interval History :   Mrs Blue had HD held yesterday with no pressing issue (likely due to poor nutrition) with the hopes that hemodynamics would improve a bit.  Unfortunately is on higher dose of norepi requiring placement of central access this am. Given this we placed a tri-alysis line in order to run norepi and CRRT.  No pressing issue needing emergent intervention but is ESRD with no intrinsic gfr so ordered CRRT w/4k baths (checking BMP 2x/day as labs are stable) and 0-50cc/h net negative.  Once hemodynamics stabilize we can consider HD modality.      Assessment & Recommendations:   ESRD-ESKD: pt dialyzes MWF at [facility] (ph [phone], f [phone]) with Dr. Glasgow. Run time: 3.5 hrs. EDW 59.7 kg. Access: L AVF. +heparin 1,500u bolus.                  -Appreciate team placing tri-alysis line to run norepi and will plan for CRRT, 4k baths and 0-50cc/h as BP's allow.                 -No need for new dialysis consent, continuing long term HD for ESRD.                -L AVF with remote hx of needing intervention, no issue recently.      Outpt Rx:       Volume-About at EDW, no edema.  Will see how I/O's and hemodynamics trend.       Pulm-Admitted with hypercapnic resp failure, suspected PNA. ID involved, getting bronch.  Currently on Zosyn, bactrim, Posaconazole, Azithromycin and Vanco x1 dose so far.       Electrolytes-No issues, starting CRRT for clearance.      BMD-Mg and Phos running low, can replace as CRRT will further  "clear Mg/Phos.      Anemia-Hgb 10.4, on venofer 50mg weekly but will hold while treating for infection. On Mircera 60mcg [id number], will cover with short term Epo while admitted when stable enough for HD.       Nutrition-Novasource renal started      Time spent: 50 minutes on this date of encounter for chart review, physical exam, medical decision making and co-ordination of care.      Seen and discussed with Dr Lawton     Recommendations were communicated to primary team via verbal communication.     ADRIÁN Lo CNS  Clinical Nurse Specialist  [phone]    Review of Systems:   I reviewed the following systems:  ROS not done due to vent/sedation    Physical Exam:   I/O last 3 completed shifts:  In: 3203.85 [I.V.:1283.85; NG/GT:420; IV Piggyback:1250]  Out: 3 [Urine:3]   BP (!) 91/36   Pulse 98   Temp 99.2  F (37.3  C) (Oral)   Resp 25   Ht 1.6 m (5' 3\")   Wt 55.6 kg (122 lb 9.2 oz)   LMP 06/07/2014 (Exact Date)   SpO2 97%   BMI 21.71 kg/m       GENERAL APPEARANCE: Intubated but alert.    EYES: No scleral icterus  Pulmonary: lungs Rhonchi to auscultation with equal breath sounds bilaterally  CV: Regular rhythm, normal rate   - Edema none  GI: soft, nontender, normal bowel sounds  MS: no evidence of inflammation in joints, no muscle tenderness  : No Basilio  SKIN: no rash, warm, dry  NEURO: No focal deficits    Labs:   All labs reviewed by me  Electrolytes/Renal -   Recent Labs   Lab Test 06/20/24  0733 06/20/24  0420 06/19/24  2359 06/19/24  0841 06/19/24  0613 06/18/24  1942 06/18/24  1803 03/13/24  0853 03/05/24  0925 11/08/23  0846 11/04/23  0653   NA  --  133* 134*  --  134*  --  137   < > 140   < > 138   POTASSIUM  --  3.7 3.6  --  3.5  --  4.1   < > 4.5   < > 3.8   CHLORIDE  --  99 100  --  98  --  100   < > 101   < > 103   CO2  --  24 24  --  22  --  27   < > 29   < > 25   BUN  --  34.5* 33.0*  --  21.8  --  17.8   < > 25.3*   < > 14.1   CR  --  4.27* 4.31*  --  3.74*  --  3.33*   < > 4.09*   " < > 2.86*   * 131* 130*   < > 121*   < > 100*   < > 93   < > 114*   CHELSEY  --  7.9* 7.7*  --  8.0*  --  8.5*   < > 8.1*   < > 8.8   MAG  --  1.5*  --   --   --   --  2.0  --  1.9   < >  --    PHOS  --  1.9*  --   --   --   --  4.1  --   --   --  3.0    < > = values in this interval not displayed.       CBC -   Recent Labs   Lab Test 06/20/24  0420 06/19/24  2359 06/19/24  0613   WBC 6.0 5.5 3.2*   HGB 10.4* 10.1* 9.6*   * 102* 106*       LFTs -   Recent Labs   Lab Test 06/20/24  0420 06/19/24  0613 06/18/24  1803   ALKPHOS 153* 175* 235*   BILITOTAL 0.9 0.8 0.6   ALT 23 28 39   AST 26 31 39   PROTTOTAL 5.0* 4.7* 6.3*   ALBUMIN 2.7* 2.7* 3.6       Iron Panel -   Recent Labs   Lab Test 02/03/21  0415 12/13/18  1033 08/01/18  0921   IRON 51 16* 93   IRONSAT 36 7* 37   YOLA  --  302* 571*           Current Medications:  Current Facility-Administered Medications   Medication Dose Route Frequency Provider Last Rate Last Admin    acetylcysteine (MUCOMYST) 10 % nebulizer solution 4 mL  4 mL Inhalation 4x Daily Kajal Chong APRN CNP   4 mL at 06/20/24 0720    albuterol (PROVENTIL) neb solution 2.5 mg  2.5 mg Nebulization 4x Daily Kajal Chong APRN CNP   2.5 mg at 06/20/24 0720    azithromycin (ZITHROMAX) 500 mg in sodium chloride 0.9 % 250 mL intermittent infusion  500 mg Intravenous Q24H Velez Reyes, German,  mL/hr at 06/19/24 1826 500 mg at 06/19/24 1826    B and C vitamin Complex with folic acid (NEPHRONEX) liquid 5 mL  5 mL Per Feeding Tube Daily Awa Watt MD   5 mL at 06/20/24 0754    calcium carbonate (TUMS) chewable tablet 500 mg  500 mg Oral Once per day on Sunday Tuesday Thursday Saturday Josesito Pratt MD   500 mg at 06/20/24 0800    chlorhexidine (PERIDEX) 0.12 % solution 15 mL  15 mL Mouth/Throat Q12H Chio Cortez MD   15 mL at 06/20/24 0753    EPINEPHrine 0.2 mg (0.2 mL of 1 mg/mL) in 20 mL saline   Topical Once Juana Baugh MD        fluticasone-vilanterol (BREO  ELLIPTA) 100-25 MCG/ACT inhaler 1 puff  1 puff Inhalation Daily Josesito Pratt MD        [Held by provider] heparin ANTICOAGULANT injection 5,000 Units  5,000 Units Subcutaneous Q8H Velez Reyes, German, MD   5,000 Units at 06/20/24 0420    lactated ringers BOLUS 500 mL  500 mL Intravenous Once Awa Watt MD        [Held by provider] magnesium chloride CR tablet 1,070 mg  1,070 mg Oral Once per day on Sunday Tuesday Thursday Saturday Josesito Pratt MD        [Held by provider] metoprolol tartrate (LOPRESSOR) tablet 25 mg  25 mg Oral BID Velez Reyes, German, MD        minocycline (MINOCIN) 100 mg in sodium chloride 0.9 % 100 mL intermittent infusion  100 mg Intravenous Q12H Kajal Chong APRN CNP        montelukast (SINGULAIR) tablet 10 mg  10 mg Oral At Bedtime Velez Reyes, German, MD   10 mg at 06/19/24 2209    pantoprazole (PROTONIX) IV push injection 40 mg  40 mg Intravenous Daily with breakfast Chio Cortez MD   40 mg at 06/20/24 0753    piperacillin-tazobactam (ZOSYN) 2.25 g vial to attach to  ml bag  2.25 g Intravenous Q6H Josesito Pratt MD   2.25 g at 06/20/24 0603    posaconazole (NOXAFIL) DR tablet TBEC 300 mg  300 mg Per Feeding Tube Daily Josesito Pratt MD        predniSONE (DELTASONE) tablet 5 mg  5 mg Oral Daily Velez Reyes, German, MD   5 mg at 06/20/24 0754    protein modular (PROSOURCE TF20) packet 1 packet  1 packet Per Feeding Tube Daily Awa Watt MD   1 packet at 06/20/24 0754    sodium chloride (PF) 0.9% PF flush 10 mL  10 mL Intracatheter Q8H Kajal Chong APRN CNP        sodium chloride (PF) 0.9% PF flush 10 mL  10 mL Intracatheter Q8H Kajal Chong APRN CNP   10 mL at 06/20/24 0753    sulfamethoxazole-trimethoprim (BACTRIM) 400-80 MG per tablet 1 tablet  1 tablet Oral Once per day on Monday Wednesday Friday Velez Reyes, German, MD   1 tablet at 06/19/24 0828    tacrolimus (GENERIC) suspension 0.6 mg  0.6 mg Oral [facility] Velez Reyes, German, MD   0.6 mg at  06/20/24 0754    And    tacrolimus (GENERIC) suspension 0.7 mg  0.7 mg Oral QPM Velez Reyes, German, MD   0.7 mg at 06/19/24 1753    Vitamin D3 (CHOLECALCIFEROL) tablet 50 mcg  50 mcg Oral Once per day on Monday Wednesday Friday Velez Reyes, German, MD   50 mcg at 06/19/24 0828     Current Facility-Administered Medications   Medication Dose Route Frequency Provider Last Rate Last Admin    dextrose 10% infusion   Intravenous Continuous PRN Awa Watt MD        dialysate for CVVHD & CVVHDF (Phoxillum BK4/2.5)  12.5 mL/kg/hr CRRT Continuous Hardy Tran APRN CNS        fentaNYL (SUBLIMAZE) infusion   mcg/hr Intravenous Continuous Chio Cortez MD 0.5 mL/hr at 06/20/24 0900 25 mcg/hr at 06/20/24 0900    propofol (DIPRIVAN) infusion  5-75 mcg/kg/min (Dosing Weight) Intravenous Continuous Chio Cortez MD 3 mL/hr at 06/20/24 0900 10 mcg/kg/min at 06/20/24 0900    And    Medication Instruction   Does not apply Continuous PRN Chio Cortez MD        No heparin required   Does not apply Continuous PRN Hardy Tran APRN CNS        norepinephrine (LEVOPHED) 16 mg in  mL infusion MAX CONC CENTRAL LINE  0.01-0.6 mcg/kg/min (Dosing Weight) Intravenous Continuous Kajal Chong APRN CNP 6.2 mL/hr at 06/20/24 1046 0.13 mcg/kg/min at 06/20/24 1046    POST-filter replacement solution for CVVHD & CVVHDF (Phoxillum BK4/2.5)   CRRT Continuous Hardy Tran APRN CNS        PRE-filter replacement solution for CVVHD & CVVHDF (Phoxillum BK4/2.5)  12.5 mL/kg/hr CRRT Continuous Hardy Tran APRN CNS

## 2024-06-21 NOTE — PROGRESS NOTES
"Bronchoscopy Risk Assessment Guidelines      A. Patient symptoms to consider when assessing pulmonary TB risk are:    I. Cough greater than 3 weeks; and fever, hemoptysis, pleuritic chest    pain, weight loss greater than 10 lbs, night sweats, fatigue, infiltrates on    upper lobes or superior segments of lower lobes, cavitation on chest    x-ray.   B. Patient risk factors to consider when assessing pulmonary TB risk are:    I. Exposure to known TB case, foreign-born persons (within 5 years of    arrival to [place]), residence in a crowded setting (correctional facility,     long-term care center, etc.), persons with HIV or immunosuppression.    Patients with symptoms and risk factors should generally be considered \"suspect risk\" and bronchoscopies should be performed in airborne precautions.    This patient has NO KNOWN RISK of Tuberculosis (proceed with bronchoscopy)    Specimens sent: yes  Complications: None  Scope used: Disposable  Attending Physician: Dr. Daniel Townsend, RT on 6/21/2024 at 5:22 PM   "

## 2024-06-21 NOTE — PLAN OF CARE
ICU End of Shift Summary. See flowsheets for vital signs and detailed assessment.    Changes this shift: Pressor requirements doubled with norepi at 0.13 at start of shift, currently 0.24; vaso unchanged at 4.  Vent settings changed to 20/280/8, FiO2 40%. On 50 mcg/hr fent gtt for continuous sedation; RASS 0 to -1; follows commands and nods head yes/no appropriately, per  more lethargic than yesterday pre-procedure.  Three loose, watery BMs, bowel meds held this am, georges skin reddened, protective mepilex over sacrum/coccyx with blanchable redness, high risk for coccyx PI due to bony prominence.  Continues on CRRT, unable to pull any fluid due to worsening hypotension.  Heparin gtt added to circuit due to filter clotting q8h.  Unplanned bronch this afternoon following concern re CXR and worsening L lung.   Ranjan present at bedside and updated re: above.      Plan:  Multiple antibiotics changed; currently on merrem/levofloxacin/minocycline/dapto.  Frequent repositioning side/side to prevent PI to sacrum/coccyx.  Continue to assess RASS/sedation.  Titrate pressors prn; when pressor requirements decreased, discuss matching I=O via CRRT with team.      Problem: Adult Inpatient Plan of Care  Goal: Plan of Care Review  Description: The Plan of Care Review/Shift note should be completed every shift.  The Outcome Evaluation is a brief statement about your assessment that the patient is improving, declining, or no change.  This information will be displayed automatically on your shift  note.  Outcome: Not Progressing   Goal Outcome Evaluation:

## 2024-06-21 NOTE — PROGRESS NOTES
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  The patient was admitted on 24 for progressive hypoxia with dyspnea and worsening hypercapnia in ED requiring intubation likely d/t post-obstructive PNA 2/2 right bronchus stenosis.  Bronch confirming severe stenosis of right anastomosis with extensive RLL plugging.  IP bronch () with laser tissue debulking RMB stenosis, airway balloon dilation of RMB and BI, and placement of stent RMB to BI and bifurcating stent in RUL.  Remains intubated.  Transitioned to CRRT .     Addendum: Tacrolimus level supratherapeutic at 18, hold further tacrolimus doses for now, repeat level ordered  for ability to resume at decreased dose.  Levofloxacin started per MICU, hold chronic azithromycin for now.    Today's recommendations:  - Following pending cultures and ID work up  - ABX per transplant ID  - Defer consideration of airway clearance to IP and MICU team  - Repeat bronch in 6 weeks per IP, sooner if indicated  - Repeat Prospera and ImmuKnow () pending  - Resume chronic azithromycin  - Tacrolimus level pending, will adjust dose and repeat level as indicated  - Chronic prednisone held while on stress dose steroids  - Continue PTA posaconazole  - CMV weekly monitoring  (ordered 6/25) with stress dose steroids, revisit need for VGCV ppx if prolonged duration of steroids (>3 days) per Dr. James     Acute on chronic hypoxic/hypercapneic respiratory failure:  Post-obstructive multi-lobular PNA:  Right bronchial stenosis with RML collapse: Admitted with 2 weeks of progressive hypoxia, dyspnea, congested cough, and fatigue.  Chronic hypoxia with 2L NC, increased from 3 to 4L over this time with acute decompensation on day of admission associated with hypercapnia.  VBG 7/17/85 in ED s/p intubation.  Most recent IP bronch 2/15 s/p tissue debulking without stent placement.  Respiratory panel, COVID, and MRSA nares negative.  Additional negative ID workup: Legionella, Strep pneumoniae, Histo, CrAg, fungitell, and A. galactomannan (blood).  IgG WNL.  Procal mildly elevated at 0.24 initially (then elevated to 1.77 6/20), LA normal, but febrile to 100.7.  TTE on admission grossly normal.  CT with RUL/RLL consolidative opacities, RML collapse, and narrowing of BI and distal RLL bronchus.  Started on norepi 6/19.  Bronch per MICU (6/19) with severe stenosis starting at right anastomosis, inspection with smaller scope revealing for extensive RLL mucous plugging.  Bronch per IP (6/20) with laser tissue debulking RMB stenosis, airway balloon dilation of RMB and BI, and placement of stent RMB to BI and bifurcating stent in RUL.  Low grade fevers persisting, remains on full vent support.    - Sputum culture (6/18) with Stenotrophomonas x2, follow  - RLL bronch wash (6/19) with NLFGNB, follow  - Blood cultures (6/18) NGTD  - Additional ID workup pending: PJP, Cocci, and A. galactomannan (BAL)  - ABX: Zosyn (6/18), minocycline (6/20, Steno) per transplant ID; s/p IV azithromycin 500 mg daily (6/18-6/20) and IV vancomycin (6/18)   - Nebs: albuterol QID, Mucomyst 10% QID, NS BID (added 6/20 with stents), Volera stopped 6/20 with stent placement, defer consideration of airway clearance to IP and MICU  team  - Repeat bronch in 6 weeks per IP, sooner if indicated  - Vent management per MICU  - Stress dose steroids per MICU     S/p bilateral sequential lung transplant (BSLT) for ILD:   Progressive CLAD: Most recent OP visit in February.  DSA negative 6/19 (last positive noted 2018).  - Repeat Prospera (6/19) pending, may be artificially elevated with current infection, but notably high at 2.42 on 2/14  - Repeat ImmuKnow (6/19) pending, prior low normal at 227 on 2/14)  - PTA Singulair, azithromycin (resume chronic dose), and Breo inhaler as able (resume once extubated)     Immunosuppression:  On 2-drug IST since November d/t leukopenia and recurrent infections  - Tacrolimus 0.6 mg qAM / 0.7 mg qPM.  Goal level 8-10.  Level 6/19 unexpectedly elevated (additionally missed 6/18 AM dose), but pt. is historically very sensitive to dose adjustments so decreased deferred.  Level 6/21 pending, will adjust dose and repeat level as indicated.  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  - ABX and infectious work up as above     Aspergillus ochraceus:  H/o Aspergillus empyema:  S/p empyema drainage and ampho B instillation.  Previously on voriconazole, transitioned to posaconazole and managed by transplant ID as OP with last visit 3/13.  Calcified fungal elements remain with additional recurrent effusion (likely associated with fluid disturbances r/t iHD), but surgical intervention previously deferred d/t risk.  Posaconazole level 2.5 on 6/19.  - Posaconazole 300 mg daily     H/o CMV viremia: Recurrent CMV viremia historically.  VGCV ppx most recently stopped 4/12.  Most recent CMV low positive at 46 (6/12) and <35 (6/18).  - CMV weekly monitoring (ordered 6/25) with stress dose steroids,  "revisit need for VGCV ppx if prolonged duration of steroids (>3 days) per Dr. James     EBV viremia: S/p rituximab 12/13/23 x1 dose.  Most recent EBV negative 6/18.     ESRD on iHD: Kidney evaluation started as OP.  On qMWF iHD schedule, no missed sessions.  Transitioned to CRRT on 6/20, management per renal.    Positive urine culture: Noted 6/19, >100k GNB and VRE faecium, ABX as above with management per MICU team.  - Daptomycin per transplant ID    We appreciate the excellent care provided by the MICU team.  Recommendations communicated via in person rounding and this note.  Will continue to follow along closely, please do not hesitate to call with any questions or concerns.    Patient discussed with Dr. James.    Cindy Mcadams PA-C  [facility]  Pulmonary CF/Transplant     Subjective & Interval History:     Remains on full vent support, PEEP increased to 10 and FiO2 50% this morning, PIP 30s.  Remains on norepinephrine and vasopressin, CRRT started yesterday.  Tmax 99.2.  TF at goal, having BM.  Denies pain.    Review of Systems:     ROS limited due to intubation    C: + increased weight  INTEGUMENTARY/SKIN: No rash or obvious new lesions  ENT/MOUTH: No nasal drainage  RESP: See interval history  CV: + peripheral edema  GI: No nausea, no vomiting, no change in stools  : + anuric  MUSCULOSKELETAL: No myalgias, no arthralgias  ENDOCRINE: Blood sugars with adequate control  NEURO: No headache  PSYCHIATRIC: Mood stable    Physical Exam:     All notes, images, and labs from past 24 hours (at minimum) were reviewed.    Vital signs:  Temp: 98.9  F (37.2  C) Temp src: Axillary BP: (!) 91/36 Pulse: 92   Resp: 30 SpO2: 94 % O2 Device: Mechanical Ventilator Oxygen Delivery: 6 LPM Height: 160 cm (5' 3\") Weight: 56.2 kg (123 lb 14.4 oz)  I/O:   Intake/Output Summary (Last 24 hours) at 6/21/2024 0659  Last data filed at 6/21/2024 0600  Gross per 24 hour   Intake 2347.01 ml   Output 875 ml   Net 1472.01 ml "     Constitutional: Lying in bed, in no apparent distress.   HEENT: Eyes with pink conjunctivae, anicteric.  Orally intubated.  Nasal FT.  PULM: Mildly diminished air flow to bases bilaterally.  Coarse crackles t/o.  No rhonchi, no wheezes.  Non-labored breathing on full vent support.  CV: Normal S1 and S2.  RRR.  No murmur, gallop, or rub.  + BLE edema.  ABD: NABS, soft, nontender, nondistended.    MSK: No apparent muscle wasting.   NEURO: Opens eyes to voice, able to answer some yes/no questions by nodding/shaking head.  SKIN: Warm (except bilateral toes cool/dusky), dry.    PSYCH: Calm.     Data:     LABS    CMP:   Recent Labs   Lab 06/21/24  0356 06/20/24  2333 06/20/24  1942 06/20/24  1506 06/20/24  0733 06/20/24  0420 06/19/24  2359 06/19/24  0841 06/19/24  0613 06/18/24  1942 06/18/24  1803   *  134*  --   --  133*  --  133* 134*  --  134*  --  137   POTASSIUM 3.6  3.6  --   --  3.8  --  3.7 3.6  --  3.5  --  4.1   CHLORIDE 103  103  --   --  101  --  99 100  --  98  --  100   CO2 20*  20*  --   --  20*  --  24 24  --  22  --  27   ANIONGAP 11  11  --   --  12  --  10 10  --  14  --  10   *  155*  155* 101* 116* 122*  123*   < > 131* 130*   < > 121*   < > 100*   BUN 32.3*  32.3*  --   --  40.7*  --  34.5* 33.0*  --  21.8  --  17.8   CR 2.72*  2.72*  --   --  4.04*  --  4.27* 4.31*  --  3.74*  --  3.33*   GFRESTIMATED 19*  19*  --   --  12*  --  11* 11*  --  13*  --  15*   CHELSEY 7.9*  7.9*  --   --  7.5*  --  7.9* 7.7*  --  8.0*  --  8.5*   MAG 2.2  --   --  1.6*  --  1.5*  --   --   --   --  2.0   PHOS 2.6  --   --  2.5  --  1.9*  --   --   --   --  4.1   PROTTOTAL 4.7*  --   --   --   --  5.0*  --   --  4.7*  --  6.3*   ALBUMIN 2.4*  2.4*  --   --  2.6*  --  2.7*  --   --  2.7*  --  3.6   BILITOTAL 1.1  --   --   --   --  0.9  --   --  0.8  --  0.6   ALKPHOS 123  --   --   --   --  153*  --   --  175*  --  235*   AST 12  --   --   --   --  26  --   --  31  --  39   ALT 20  --   --    "--   --  23  --   --  28  --  39    < > = values in this interval not displayed.     CBC:   Recent Labs   Lab 06/21/24  0356 06/20/24  0420 06/19/24  2359 06/19/24  0613   WBC 5.8 6.0 5.5 3.2*   RBC 3.04* 3.32* 3.21* 3.10*   HGB 9.6* 10.4* 10.1* 9.6*   HCT 29.8* 32.2* 31.1* 30.9*   MCV 98 97 97 100   MCH 31.6 31.3 31.5 31.0   MCHC 32.2 32.3 32.5 31.1*   RDW 15.0 14.8 14.8 14.6   PLT 98* 109* 102* 106*       INR:   Recent Labs   Lab 06/18/24  1418   INR 0.98       Glucose:   Recent Labs   Lab 06/21/24  0356 06/20/24  2333 06/20/24  1942 06/20/24  1506   *  155*  155* 101* 116* 122*  123*       Blood Gas:   Recent Labs   Lab 06/19/24  2359 06/19/24  0614 06/18/24  2348   PHV 7.40 7.45* 7.41   PCO2V 43 37* 43   PO2V 46 43 43   HCO3V 26 26 27   LASHAUN 1.3 1.9 1.9   O2PER 30 45 40       Culture Data No results for input(s): \"CULT\" in the last 168 hours.    Virology Data:   Lab Results   Component Value Date    FLUAH1 Not Detected 06/18/2024    FLUAH3 Not Detected 06/18/2024    [id number] Not Detected 06/18/2024    IFLUB Not Detected 06/18/2024    RSVA Not Detected 06/18/2024    RSVB Not Detected 06/18/2024    PIV1 Not Detected 06/18/2024    PIV2 Not Detected 06/18/2024    PIV3 Not Detected 06/18/2024    HMPV Not Detected 06/18/2024    HRVS Negative 01/24/2021    ADVBE Negative 01/24/2021    ADVC Negative 01/24/2021    ADVC Negative 12/23/2020    ADVC Negative 10/07/2019       Historical CMV results (last 3 of prior testing):  Lab Results   Component Value Date    CMVQNT <35 (A) 06/18/2024    CMVQNT Not Detected 03/05/2024    CMVQNT Not Detected 12/19/2023     Lab Results   Component Value Date    CMVLOG <1.5 06/18/2024    CMVLOG 2.5 02/14/2024    CMVLOG <1.5 10/31/2023       Urine Studies    Recent Labs   Lab Test 06/19/24  1214 01/24/21  1729   URINEPH 6.5 5.0   NITRITE Negative Negative   LEUKEST Large* Moderate*   WBCU >182* 34*       Most Recent [place] Pulmonary Function Testing (FVC/FEV1 only)  FVC-Pre   Date " Value Ref Range Status   2024 0.85 L    2023 1.04 L    2023 0.85 L    10/24/2023 0.96 L      FVC-%Pred-Pre   Date Value Ref Range Status   2024 29 %    2023 36 %    2023 29 %    10/24/2023 33 %      FEV1-Pre   Date Value Ref Range Status   2024 0.80 L    2023 0.89 L    2023 0.78 L    10/24/2023 0.87 L      FEV1-%Pred-Pre   Date Value Ref Range Status   2024 34 %    2023 38 %    2023 33 %    10/24/2023 37 %        IMAGING    Recent Results (from the past 48 hour(s))   Echo Complete   Result Value    LVEF  55-60%    Narrative    [id number]  [id number]  [id number]  [id number]^VELEZ REYES^JAMEL     [facility],[place]  Echocardiography Laboratory  [address] [place] [id number]     Name: SARAI KILLIAN  MRN: [id number]  : 1960  Study Date: 2024 08:11 AM  Age: 63 yrs  Gender: Female  Patient Location: [facility] [place]  Reason For Study: Cardiomyopathy  Ordering Physician: VELEZ REYES, GERMAN  Performed By: Patricia Mandujano RDCS     BSA: 1.5 m2  Height: 63 in  Weight: 112 lb  HR: 80  BP: 94/45 mmHg  ______________________________________________________________________________  Procedure  Complete Portable Echo Adult.  ______________________________________________________________________________  Interpretation Summary  Global and regional left ventricular function is normal with an EF of 55-60%.  Right ventricular function, chamber size, wall motion, and thickness are  normal.  The inferior vena cava cannot be assessed.  No pericardial effusion is present.  No significant valvular abnormalities present.  ______________________________________________________________________________  Left Ventricle  Global and regional left ventricular function is normal with an EF of 55-60%.  Left ventricular wall thickness is normal. Left ventricular size is normal.  Left ventricular diastolic function is normal. No regional wall  motion  abnormalities are seen.     Right Ventricle  Right ventricular function, chamber size, wall motion, and thickness are  normal.     Atria  Both atria appear normal.     Mitral Valve  The mitral valve is normal. Trace mitral insufficiency is present.     Aortic Valve  The valve leaflets are not well visualized. On Doppler interrogation, there is  no significant stenosis or regurgitation.     Tricuspid Valve  Mild tricuspid insufficiency is present. The right ventricular systolic  pressure is approximated at 35.5 mmHg plus the right atrial pressure.     Pulmonic Valve  The pulmonic valve is normal.     Vessels  The thoracic aorta is normal. The pulmonary artery is normal. The inferior  vena cava cannot be assessed.     Pericardium  No pericardial effusion is present.     Miscellaneous  No significant valvular abnormalities present.  ______________________________________________________________________________  MMode/2D Measurements & Calculations     IVSd: 1.2 cm  LVIDd: 4.0 cm  LVIDs: 2.8 cm  LVPWd: 1.1 cm  FS: 29.9 %  LV mass(C)d: 161.0 grams  LV mass(C)dI: 106.5 grams/m2  Ao root diam: 3.2 cm  asc Aorta Diam: 3.0 cm  LVOT diam: 2.0 cm  LVOT area: 3.3 cm2  Ao root diam index Ht(cm/m): 2.0  Ao root diam index BSA (cm/m2): 2.1  Asc Ao diam index BSA (cm/m2): 2.0  Asc Ao diam index Ht(cm/m): 1.9  RWT: 0.57  TAPSE: 1.3 cm     Doppler Measurements & Calculations  MV E max herberth: 82.3 cm/sec  MV A max herberth: 85.9 cm/sec  MV E/A: 0.96     MV dec slope: 271.7 cm/sec2  MV dec time: 0.30 sec  PA acc time: 0.10 sec  TR max herberth: 298.0 cm/sec  TR max P.5 mmHg  E/E' avg: 15.7  Lateral E/e': 19.4  Medial E/e': 12.0     ______________________________________________________________________________  Report approved by: Glen Zhang 2024 09:29 AM         XR Abdomen Port 1 View    Narrative    EXAMINATION: US ABDOMEN COMPLETE,  2024 12:06 PM     COMPARISON: 2024     HISTORY: Verify small bowel feeding tube  bedside placement    FINDINGS: Feeding tube tip in the inferior duodenum. No air-filled  dilated loops of bowel. Basilar atelectasis and consolidation in the  lungs.      Impression    IMPRESSION: Feeding tube in the third portion of the duodenum.     JAMES LAI MD         SYSTEM ID:  [id number]   XR Chest Port 1 View    Narrative    Exam: XR CHEST PORT 1 VIEW, 6/20/2024 6:49 AM    Comparison: 6/18/2024    History: s/p BSLT, AHRF in setting of RLL pneumonia    Findings:  Portable AP view of the chest. Endotracheal tube in the thoracic  trachea 2.3 cm above the gee. Partially imaged enteric tube.  Cardiomegaly with bilateral  basilar predominant opacities, mildly  increased in the upper lobes. No pneumothorax. Unchanged sternotomy  wires and surgical clips.      Impression    Impression:   Bilateral pulmonary opacities, mildly increased in the upper lobes  suggesting increased edema and underlying infection.   The endotracheal tube projects 2.3 cm above the gee.    I have personally reviewed the examination and initial interpretation  and I agree with the findings.    REJI VIVAS DO         SYSTEM ID:  [id number]   XR Chest Port 1 View    Narrative    EXAM: XR CHEST PORT 1 VIEW 6/20/2024 10:51 AM    INDICATION: CVC placement    COMPARISON: Same-day chest radiograph 06:21    TECHNIQUE: Single portable semiupright AP view of the chest.    FINDINGS:   Interval placement of right IJ central venous catheter with tip  projecting over the cavoatrial junction. Endotracheal tube tip 3.3 cm  above the gee. Enteric tube coursing below left hemidiaphragm.  Right upper extremity midline catheter tip projecting over the axilla.  Postsurgical changes of bilateral lung transplant with intact  sternotomy wires and mediastinal clips. Similar diaphragm silhouetting  and slightly improved patchy bibasilar and perihilar opacities. No  evidence of pneumothorax.      Impression    IMPRESSION:   1. Interval placement of right  IJ central venous catheter with tip  projecting over the cavoatrial junction. Slight retraction of the ET  tube to 3.3 cm above the gee, otherwise stable support devices.  2. Stable bilateral pleural effusions with slightly improved bibasilar  atelectasis versus improved patient positioning. Persistent perihilar  patchy opacities.    I have personally reviewed the examination and initial interpretation  and I agree with the findings.    ADAM GONZALEZ MD         SYSTEM ID:  [id number]   XR Surgery SHANNAN L/T 5 Min Fluoro w Stills    Narrative    This exam was marked as non-reportable because it will not be read by a   radiologist or a [facility] non-radiologist provider.         XR Chest Port 1 View    Narrative    EXAM: XR CHEST PORT 1 VIEW 6/20/2024 2:35 PM    INDICATION: airway stenting    COMPARISON: Same day chest radiograph 10:42    TECHNIQUE: Single portable semiupright AP view of the chest.    FINDINGS:   Interval placement of right main stem bronchial stent. Other unchanged  support devices including right upper shotty midline tip in the  axilla, right IJ central venous catheter at the cavoatrial junction,  endotracheal tube 2.6 cm above the gee, enteric tube coursing below  left hemidiaphragm.    Postsurgical changes of bilateral lung transplant with midline  sternotomy wires intact. Unchanged mixed interstitial and airspace  opacities, slightly increased compared to prior. Stable cardiomegaly.  No pneumothorax.      Impression    IMPRESSION:   Interval placement of right mainstem bronchial stent, otherwise  unchanged support devices. Slightly increased mixed airspace and  interstitial opacities suggesting possible underlying infection with  concomitant pulmonary edema.    I have personally reviewed the examination and initial interpretation  and I agree with the findings.    ADAM GONZALEZ MD         SYSTEM ID:  [id number]

## 2024-06-21 NOTE — PLAN OF CARE
ICU End of Shift Summary. See flowsheets for vital signs and detailed assessment.    Changes this shift: RASS 0/-1. Denies pain. Levo and Vaso titrated to maintain MAP> 65. Minimal ectopies noted overnight. Vent 40%, PEEP 5. TF at goal. BM X1.     Plan: wean pressors.           Goal Outcome Evaluation:        Problem: Adult Inpatient Plan of Care  Goal: Plan of Care Review  Description: The Plan of Care Review/Shift note should be completed every shift.  The Outcome Evaluation is a brief statement about your assessment that the patient is improving, declining, or no change.  This information will be displayed automatically on your shift  note.  Flowsheets (Taken 6/21/2024 2483)  Outcome Evaluation: see note

## 2024-06-21 NOTE — PROGRESS NOTES
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 Nephrology consulted for management of HD while admitted.      Interval History :   Mrs Blue had CRRT started via temp line yesterday, also had bronch with stenting of R bronchus across anastomosis.  Pressor needs are up this am and now is on moderate dose of norepi and vaso.  Labs are appropriately stable on CRRT but would anticipate difficulty pulling fluid today, ordered for I=O as able.  Frequent circuit clotting (every ~8-10 hours), added heparin to CRRT circuit at 500u/h, I stopped her subcutaneous heparin for DVT as they are essentially equivalent doses.        Assessment & Recommendations:   ESRD-ESKD: pt dialyzes MWF at [facility] (ph [phone], f [phone]) with Dr. Glasgow. Run time: 3.5 hrs. EDW 59.7 kg. Access: L AVF. +heparin 1,500u bolus.                  -Appreciate team placing tri-alysis line to run norepi and will plan for CRRT, 4k baths, I=O as able today, 500u heparin/h through circuit.                -No need for new dialysis consent, continuing long term HD for ESRD.                -L AVF with remote hx of needing intervention, no issue recently.      Outpt Rx:       Volume-About at EDW, no edema.  Will see how I/O's and hemodynamics trend.       Pulm-Admitted with hypercapnic resp failure, suspected PNA. ID involved, getting bronch.  Currently on Zosyn, bactrim, Posaconazole, Azithromycin and Vanco x1 dose so far.       Electrolytes-Stable on CRRT, bicarb 19 with low level lactate but pH 7.33.       BMD-Mg and Phos running low, can replace as CRRT will further  "clear Mg/Phos.      Anemia-Hgb 9.6, on venofer 50mg weekly but will hold while treating for infection. On Mircera 60mcg [id number], will cover with short term Epo while admitted when stable enough for HD.       Nutrition-Novasource renal started      Time spent: 50 minutes on this date of encounter for chart review, physical exam, medical decision making and co-ordination of care.      Seen and discussed with Dr Lawton     Recommendations were communicated to primary team via verbal communication.     ADRIÁN Lo CNS  Clinical Nurse Specialist  [phone]    Review of Systems:   I reviewed the following systems:  ROS not done due to vent/sedation    Physical Exam:   I/O last 3 completed shifts:  In: 2347.01 [I.V.:1471.01; NG/GT:546]  Out: 875 [Other:875]   BP (!) 91/36   Pulse 111   Temp 98.5  F (36.9  C) (Axillary)   Resp 30   Ht 1.6 m (5' 3\")   Wt 56.2 kg (123 lb 14.4 oz)   LMP 06/07/2014 (Exact Date)   SpO2 98%   BMI 21.95 kg/m       GENERAL APPEARANCE: Intubated but alert.    EYES: No scleral icterus  Pulmonary: lungs Rhonchi to auscultation with equal breath sounds bilaterally  CV: Regular rhythm, normal rate   - Edema none  GI: soft, nontender, normal bowel sounds  MS: no evidence of inflammation in joints, no muscle tenderness  : No Basilio  SKIN: no rash, warm, dry  NEURO: No focal deficits    Labs:   All labs reviewed by me  Electrolytes/Renal -   Recent Labs   Lab Test 06/21/24  0356 06/20/24  2333 06/20/24  1942 06/20/24  1506 06/20/24  0733 06/20/24  0420   *  134*  --   --  133*  --  133*   POTASSIUM 3.6  3.6  --   --  3.8  --  3.7   CHLORIDE 103  103  --   --  101  --  99   CO2 20*  20*  --   --  20*  --  24   BUN 32.3*  32.3*  --   --  40.7*  --  34.5*   CR 2.72*  2.72*  --   --  4.04*  --  4.27*   *  155*  155* 101*   < > 122*  123*   < > 131*   CHELSEY 7.9*  7.9*  --   --  7.5*  --  7.9*   MAG 2.2  --   --  1.6*  --  1.5*   PHOS 2.6  --   --  2.5  --  1.9*    < > = " values in this interval not displayed.       CBC -   Recent Labs   Lab Test 06/21/24  0356 06/20/24  0420 06/19/24  2359   WBC 5.8 6.0 5.5   HGB 9.6* 10.4* 10.1*   PLT 98* 109* 102*       LFTs -   Recent Labs   Lab Test 06/21/24  0356 06/20/24  1506 06/20/24  0420 06/19/24  0613   ALKPHOS 123  --  153* 175*   BILITOTAL 1.1  --  0.9 0.8   ALT 20  --  23 28   AST 12  --  26 31   PROTTOTAL 4.7*  --  5.0* 4.7*   ALBUMIN 2.4*  2.4* 2.6* 2.7* 2.7*       Iron Panel -   Recent Labs   Lab Test 02/03/21  0415 12/13/18  1033 08/01/18  0921   IRON 51 16* 93   IRONSAT 36 7* 37   YOLA  --  302* 571*           Current Medications:  Current Facility-Administered Medications   Medication Dose Route Frequency Provider Last Rate Last Admin    acetylcysteine (MUCOMYST) 10 % nebulizer solution 4 mL  4 mL Inhalation 4x Daily Kajal Chong APRN CNP   4 mL at 06/21/24 0853    albuterol (PROVENTIL) neb solution 2.5 mg  2.5 mg Nebulization 4x Daily Kajal Chong APRN CNP   2.5 mg at 06/21/24 0853    azithromycin (ZITHROMAX) tablet 250 mg  250 mg Oral or Feeding Tube Daily Evelyn Roberts APRN CNP        B and C vitamin Complex with folic acid (NEPHRONEX) liquid 5 mL  5 mL Per Feeding Tube Daily Awa Watt MD   5 mL at 06/21/24 0822    calcium carbonate (TUMS) chewable tablet 500 mg  500 mg Oral Once per day on Sunday Tuesday Thursday Saturday Josesito Pratt MD   500 mg at 06/20/24 0800    chlorhexidine (PERIDEX) 0.12 % solution 15 mL  15 mL Mouth/Throat Q12H Chio Cortez MD   15 mL at 06/21/24 0822    fludrocortisone (FLORINEF) tablet 0.1 mg  0.1 mg Oral or Feeding Tube Daily Evelyn Roberts APRN CNP        heparin ANTICOAGULANT injection 5,000 Units  5,000 Units Subcutaneous Q8H Evelyn Roberts APRN CNP   5,000 Units at 06/20/24 0420    hydrocortisone sodium succinate PF (solu-CORTEF) injection 50 mg  50 mg Intravenous Q6H Evelyn Roberts APRN CNP        insulin aspart (NovoLOG) injection (RAPID ACTING)  1-7  Units Subcutaneous Q4H Awa Watt MD        [Held by provider] magnesium chloride CR tablet 1,070 mg  1,070 mg Oral Once per day on Sunday Tuesday Thursday Saturday Josesito Pratt MD        [Held by provider] metoprolol tartrate (LOPRESSOR) tablet 25 mg  25 mg Oral BID Velez Reyes, German, MD        minocycline (MINOCIN) 100 mg in sodium chloride 0.9 % 100 mL intermittent infusion  100 mg Intravenous Q12H Kajal Chong APRN  mL/hr at 06/20/24 2328 100 mg at 06/20/24 2328    montelukast (SINGULAIR) tablet 10 mg  10 mg Oral At Bedtime Velez Reyes, German, MD   10 mg at 06/20/24 2136    pantoprazole (PROTONIX) 2 mg/mL suspension 40 mg  40 mg Per Feeding Tube Daily Josesito Pratt MD        piperacillin-tazobactam (ZOSYN) 4.5 g vial to attach to  mL bag  4.5 g Intravenous Q6H Josesito Pratt MD   4.5 g at 06/21/24 0822    polyethylene glycol (MIRALAX) Packet 17 g  17 g Oral or Feeding Tube Daily Kajal Chong APRN CNP   17 g at 06/20/24 1531    posaconazole (NOXAFIL) DR tablet TBEC 300 mg  300 mg Per Feeding Tube Daily Josesito Pratt MD   300 mg at 06/20/24 1349    [Held by provider] predniSONE (DELTASONE) tablet 5 mg  5 mg Oral Daily Velez Reyes, German, MD   5 mg at 06/21/24 0822    protein modular (PROSOURCE TF20) packet 1 packet  1 packet Per Feeding Tube BID Awa Watt MD   1 packet at 06/21/24 0824    senna-docusate (SENOKOT-S/PERICOLACE) 8.6-50 MG per tablet 1 tablet  1 tablet Oral or Feeding Tube BID Kajal Chong APRN CNP   1 tablet at 06/20/24 1933    sodium chloride (PF) 0.9% PF flush 10 mL  10 mL Intracatheter Q8H Kajal Chong APRN CNP        sodium chloride (PF) 0.9% PF flush 10 mL  10 mL Intracatheter Q8H Kajal Chong APRN CNP   10 mL at 06/21/24 0824    sodium chloride 0.9 % neb solution 3 mL  3 mL Nebulization BID Hayden Lou MD   3 mL at 06/20/24 2031    sulfamethoxazole-trimethoprim (BACTRIM) 400-80 MG per tablet 1 tablet  1 tablet Oral or Feeding Tube  Daily Josesito Pratt MD   1 tablet at 06/20/24 1933    tacrolimus (GENERIC) suspension 0.6 mg  0.6 mg Oral QAM Velez Reyes, German, MD   0.6 mg at 06/21/24 0822    And    tacrolimus (GENERIC) suspension 0.7 mg  0.7 mg Oral QPM Velez Reyes, German, MD   0.7 mg at 06/20/24 1810    Vitamin D3 (CHOLECALCIFEROL) tablet 50 mcg  50 mcg Oral Once per day on Monday Wednesday Friday Velez Reyes, German, MD   50 mcg at 06/21/24 0834     Current Facility-Administered Medications   Medication Dose Route Frequency Provider Last Rate Last Admin    dextrose 10% infusion   Intravenous Continuous PRN Awa Watt MD        dialysate for CVVHD & CVVHDF (Phoxillum BK4/2.5)  12.5 mL/kg/hr CRRT Continuous Hardy Tran APRN  mL/hr at 06/21/24 0453 12.5 mL/kg/hr at 06/21/24 0453    fentaNYL (SUBLIMAZE) infusion   mcg/hr Intravenous Continuous Chio Cortez MD 1 mL/hr at 06/21/24 1100 50 mcg/hr at 06/21/24 1100    No heparin required   Does not apply Continuous PRN Hardy Tran APRN CNS        norepinephrine (LEVOPHED) 16 mg in  mL infusion MAX CONC CENTRAL LINE  0.01-0.6 mcg/kg/min (Dosing Weight) Intravenous Continuous Kajal Chong APRN CNP 9 mL/hr at 06/21/24 1100 0.19 mcg/kg/min at 06/21/24 1100    POST-filter replacement solution for CVVHD & CVVHDF (Phoxillum BK4/2.5)   CRRT Continuous Hardy Tran APRN  mL/hr at 06/20/24 1314 New Bag at 06/20/24 1314    PRE-filter replacement solution for CVVHD & CVVHDF (Phoxillum BK4/2.5)  12.5 mL/kg/hr CRRT Continuous Hardy Tran APRN  mL/hr at 06/21/24 0453 12.5 mL/kg/hr at 06/21/24 0453    vasopressin 1 unit/mL MAX Conc (PITRESSIN) infusion  4 Units/hr Intravenous Continuous Chio Cortez MD 4 mL/hr at 06/21/24 1100 4 Units/hr at 06/21/24 1100

## 2024-06-21 NOTE — PROGRESS NOTES
"CRRT STATUS NOTE    DATA:  Time:  6:28 AM  Pressures WNL:  YES  Filter Status:  WDL    Problems Reported/Alarms Noted:  TMP, filter pressures too high    Supplies Present:  YES    ASSESSMENT:  Patient Net Fluid Balance:  Positive 5.7 L since admit,  positive 250 ml since midnight  Vital Signs: BP (!) 91/36   Pulse 91   Temp 98.9  F (37.2  C) (Axillary)   Resp 30   Ht 1.6 m (5' 3\")   Wt 56.2 kg (123 lb 14.4 oz)   LMP 06/07/2014 (Exact Date)   SpO2 94%   BMI 21.95 kg/m    Labs: K+ 3.6, Cr 2.72, ICA 4.6, Lactate 0.9, Hgb 9.6  Goals of Therapy: net negative 0-50 ml/hr    INTERVENTIONS:   Circuit changed last evening without difficulty    PLAN:  Continue current plan of care.  Remove fluid as pt tolerates. Notify CRRT RN with questions and concerns.  "

## 2024-06-21 NOTE — PROGRESS NOTES
[facility]    Transplant Infectious Diseases Inpatient Progress note      Kecia Blue MRN# [id number]   YOB: 1960 Age: 63 year old   Date of Admission: 6/18/2024  1:47 PM  Transplant: 3/1/2018 (Lung), Postoperative day: 2304            Recommendations:   Continue renally dosed/CRRT IV Zosyn  Continue IV minocycline 100 mg q12 hrs for Stenotrophomonas coverage  Add IV daptomycin 8 mg/kg/day for VRE coverage  Check baseline CK (ordered)  Dose adjust per pharmacy while on CRRT/per renal function  Will repeat blood cultures x2 today prior to daptomycin (ordered)  Follow up pending cultures (blood, sputum, urine, BAL) and susceptibilities  Continue home posaconazole 300 mg daily  Continue home Bactrim 1 single strength three times weekly ppx for history of PJP  Azithromycin per transplant pulmonology        Basics of Transplant and Current Presentation:   Transplants:  3/1/2018 (Lung), Postoperative day:  2304     This patient is a 63 year old female with PMHx significant for ILD, 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         Active Problems and Infectious Diseases Issues:     Acute hypercapnic and hypoxic respiratory failure, intubated 6/18/24  Multilobar pneumonia, right  Right mainstem bronchial stenosis requiring dilations (dilated 2/15/24, s/p 6/20 dilation with stent placement)  History of Stenotrophomonas colonization (ceftazidime R, levo mixed susceptibilities; susceptible to Bactrim and minocycline)  On chronic 3  L oxygen at home. Opacities in lung since Oct 2023 (along with PFTs) in the setting of RMB stenosis and post obstructive pneumonia. Completed 2 months amoxicillin (03-05/2024) for the actinomyces that grew in Oct BAL without any significant clinical change. Recent sinus congestion suggests likely recent viral illness +  also sick, followed by increased secretions, weak cough, and hypercapnic respiratory failure concerning for pneumonia (likely bacterial vs post-obstructive) vs mucus plug. Viral workup (RVP, covid/flu/RSV) negative here. CT chest 6/18/24 with known right mainstem anastomosis and bronchus intermedius stenosis (last dilation 2/15/24) with new narrowing of R BI and RML occlusion, narrowing of distal RLL bronchus, and RLL consolidations. On vancomycin, zosyn, and azithromycin in ED. Vancomycin stopped 6/19 with negative MRSA nares. Lower suspicion for fungal or PJP etiology as she remains on home posaconazole and Bactrim ppx. Negative Fungitell and AGM. Bronchoscopy with BAL 6/19 notable for stenosis of R mainstem and proximal BI with RLL mucoid plugs removed, cultures pending with GNB. Had IP bronchoscopy intervention 6/20 - laser tissue debulking, dilation x2 (RMB and BI) and 1 stent placed. Sputum culture with Stenotrophomonas (known colonizer for her) and GNB. Covering Stenotrophomonas since 6/20 with minocycline given ongoing pressor requirements + purulent secretions. Increasing pressor and oxygen/vent requirements - postobstructive PNA vs pulmonary edema vs ARDS.   - Continue Zosyn and minocycline  - Follow up pending workup: blood culture x2, sputum and BAL cultures     Pyuria, Urine culture with VRE  Patient with ESRD on HD, makes very little urine. Straight cath for urinary specimens (Strep/Legionella antigens - negative), UA - abnormal with pyuria, and Ucx now with GNB and VRE faecium. No prior history of UTI. Does report some dysuria today but this is after straight cath. No  abdominal/suprapubic tenderness, frequency, urgency. Difficult to interpret if true UTI vs asymptomatic bacteruria (ASB) in patient's with ESRD/HD. Likely this is ASB/colonization and broad spectrum antimicrobial use and residual small volume urine has selected for VRE. Given pressor needs, agree to VRE coverage for now though feel lungs are primarily driving her shock and pressor requirements. Daptomycin does not penetrate lungs, though no resistant gram positive has been isolated from sputum or BAL to warrant more broad pulmonary coverage.   - Would start IV daptomycin 8 mg/kg/day (adjust per pharmacy for CRRT/HD)   - Check baseline CK  - Follow susceptibilities/final Ucx results    History of left aspergillus empyema:  S/p drainage and vori (S to both vori and posa) in 2019, Calcified mass in left pleural cavity s/p biopsy 2020 with aspergillus on cx and path (S to both vori and posa). Vori changed to posaconazole. S/p left pleural effusion drainage April 2021 - exudative, fungal elements seen on KOH but fungal cx neg. Posa levels good and no recurrence of pleural effusion on Sep 2021 CT chest. Suspect that current left pleural effusion has been reactive to the calcified aspergillus elements there along with fluid disturbance related to dialysis. Do not think this is failure of anti-fungal therapy or recurrence of Aspergillus infection. It seems Dr. Layton has had discussions with surgery for source control (removal of calcified mass), however, there is was concern for high morbidity with surgery and therefore it was decided to pursue long term posaconazole, which she remains on since 2020.   - Continue posaconazole 300 mg daily. Level pending.         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 After treatment and stopping of valcyte, recurrence of CMV viremia to 1k in 02/2024, resumed valcyte ppx (renally dosed) and negative since  2/28/24. Valcyte again held in March 2024, with repeats negative to date. CMV negative 6/19/24.     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 Decreased to 135k 2/2022. Neg 6/27/22.   Increased to 1 million in 11/2023 s/p 1 dose of rituximab and decreasing/undetected in 05/2024. Negative EBV 6/19/24.    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 BAL showed septate hyphae. 7/2020 had hypodense mass in left lung with biopsy showing fungal hyphae, cx aspergillus. Started on voriconazole in 2019, changed to posaconazole in 2020, she remains on 300 mg daily.  - 1/2021 BAL cx with steno: tx ceftazidime for 2 weeks  - 1/2021-2/2021 - ARDS due to PJP pneumonia (had stopped TMP-SMX due to side effects). Recovery from this required tracheostomy.  - 2019 - She had also grown Actinomyces from multiple BAL    Other Infectious Disease issues include:  - QTc interval:  450 msec on 6/18/24  - Bacterial prophylaxis:  azithromycin per Tx pulm  - Pneumocystis prophylaxis:  Bactrim for life (hx PJP)  - Viral serostatus & prophylaxis: CMV D+/R+, EBV D+/R+   - Fungal prophylaxis:  posaconazole  - Immunization status:  Seasonal influenza, RSV, and COVID vaccine UTD  - Gamma globulin status:  979 on 10/27/23  - Isolation status: contact for VRE      Attestation:  I interviewed the patient and obtained history from the patient, patient's , bedside RN, and by reviewing the patient's chart including outside records, microbiological data, and radiological data. All data are summarized in this notes.  DAVIDA RAMAN PA-C  [facility]  Contact information available via [facility] Paging/Directory or [employer]    06/21/2024    50 MINUTES SPENT BY ME on the date of service doing chart review, history, exam, documentation & further activities per the note.      Interim History and Events:   Remains  intubated, but awake and interactive in ICU today. CRP uptrending, no leukocytosis, afebrile. Remains on pressors - now 2 pressors. PEEP increased to 10 and 50% FiO2.  at bedside. Kecia endorses making small amount of urine at home, endorsing some dysuria now. No abd pain or suprapubic pain.    6/19 BAL cultures - GNB to date  6/19 Ucx with GNB and VRE faecium  6/18 Sputum with Stenotrophomonas and GNR.     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      History obtained from chart review, patient's , and patient. Reported increased lethargy reported for 2-3 days PTA, poor PO intake, more tired, and weak cough. Unable to bring up sputum. Denied fever, chills, urinary symptoms (on HD), diarrhea, abdominal pain at home. Her and  both reported sinus congestion recently,  improved quickly and Kecia declined. Denies sore throat, headache. Afebrile and without leukocytosis on admission. WBC 4.0, ESR 10, lactic 0.6, and CRP ~8. Put on BIPAP given respiratory acidosis with some improvement, however unable to tolerate off BIPAP and intubated for failure to improve hypercapnia. Started on azithromycin, vancomycin, and zosyn. Negative: RVP, flu/covid/rsv, MRSA nares, Cryptococcus antigen. Pending: EBV, CMV, blood culture, fungitell, Histo Ag, AGM, and sputum culture     CT chest-hi res on 6/18/24 notable for slight decreased confluent consolidative opacities in right lower  lobe with more micronodular component of possible infection in the right lower lobe. Other abnormalities throughout the lungs similar to February of the transplanted lungs - pleural effusions, anastomoses intact with narrowing of R mainstem anastomosis. New narrowing of right bronchus intermedius with occlusion of RML bronchus (mucus plug per pulm). Febrile 100.7F following intubation, WBC 3.2, CRP up to 19, procal 0.24 in ESRD, nl lactic. Remains intubated, though on PS. On pressor x1 norepi, low dose 0.05. Nursing reports lots of secretion burden and weak cough. Planning for bronchoscopy today.     Has previously scheduled stent placement on 6/26/24 as outpatient.     ROS:  As mentioned in the interim history otherwise negative by reviewing constitutional symptoms, central and peripheral neurological systems, respiratory system, cardiac system, GI system,  system, musculoskeletal, skin, allergy, and lymphatics.                 Physical Examination:  Temp: 98.5  F (36.9  C) Temp src: Axillary   Pulse: 100   Resp: 30 SpO2: 100 % O2 Device: Mechanical Ventilator    Vitals:    06/18/24 1343 06/18/24 1700 06/19/24 0400 06/20/24 0430   Weight: 50.8 kg (112 lb) 50.8 kg (112 lb 1.6 oz) 52.8 kg (116 lb 6.4 oz) 55.6 kg (122 lb 9.2 oz)    06/20/24 2200   Weight: 56.2 kg (123 lb 14.4 oz)     Constitutional: Pleasant adult female seen laying in bed. Intubated. Awake, alert, in NAD.  HEENT: NCAT, anicteric sclerae, conjunctiva clear. Moist mucous membranes.  Respiratory: Non-labored breathing, on vent PEEP 10, 50%.  Lungs are coarse bilaterally with R > L, slightly improved aeration, without wheezing  Cardiovascular: Regular rate and rhythm with no murmur, rub or gallop.  GI: Abdomen is soft, non-distended, and non-tender to palpation. No suprapubic tenderness.  Skin: Warm and dry. Dusky toes bilaterally.  Musculoskeletal: Extremities grossly normal. No tenderness. 1+ edema present.   Neurologic: nods yes/no appropriately,  moves all extremities  VAD: Multiple central lines placed, all are c/d/i with no erythema, drainage, or tenderness.    Medications:  Medications that Require Transfusion:   Current Facility-Administered Medications   Medication Dose Route Frequency Provider Last Rate Last Admin    dextrose 10% infusion   Intravenous Continuous PRN Awa Watt MD        dialysate for CVVHD & CVVHDF (Phoxillum BK4/2.5)  12.5 mL/kg/hr CRRT Continuous Hardy Tran APRN  mL/hr at 06/21/24 0453 12.5 mL/kg/hr at 06/21/24 0453    fentaNYL (SUBLIMAZE) infusion   mcg/hr Intravenous Continuous Chio Cortez MD 1 mL/hr at 06/21/24 1000 50 mcg/hr at 06/21/24 1000    No heparin required   Does not apply Continuous PRN Hardy Tran APRN CNS        norepinephrine (LEVOPHED) 16 mg in  mL infusion MAX CONC CENTRAL LINE  0.01-0.6 mcg/kg/min (Dosing Weight) Intravenous Continuous Kajal Chong APRN CNP 8.6 mL/hr at 06/21/24 1024 0.18 mcg/kg/min at 06/21/24 1024    POST-filter replacement solution for CVVHD & CVVHDF (Phoxillum BK4/2.5)   CRRT Continuous Hardy Tran APRN  mL/hr at 06/20/24 1314 New Bag at 06/20/24 1314    PRE-filter replacement solution for CVVHD & CVVHDF (Phoxillum BK4/2.5)  12.5 mL/kg/hr CRRT Continuous Hardy Tran APRN  mL/hr at 06/21/24 0453 12.5 mL/kg/hr at 06/21/24 0453    vasopressin 1 unit/mL MAX Conc (PITRESSIN) infusion  4 Units/hr Intravenous Continuous Chio Cortez MD 4 mL/hr at 06/21/24 1000 4 Units/hr at 06/21/24 1000     Scheduled Medications:   Current Facility-Administered Medications   Medication Dose Route Frequency Provider Last Rate Last Admin    acetylcysteine (MUCOMYST) 10 % nebulizer solution 4 mL  4 mL Inhalation 4x Daily Kajal Chong APRN CNP   4 mL at 06/21/24 0853    albuterol (PROVENTIL) neb solution 2.5 mg  2.5 mg Nebulization 4x Daily Kajal Chong APRN CNP   2.5 mg at 06/21/24 0853    azithromycin  (ZITHROMAX) tablet 250 mg  250 mg Oral or Feeding Tube Daily Evelyn Roberts APRN CNP        B and C vitamin Complex with folic acid (NEPHRONEX) liquid 5 mL  5 mL Per Feeding Tube Daily Awa Watt MD   5 mL at 06/21/24 0822    calcium carbonate (TUMS) chewable tablet 500 mg  500 mg Oral Once per day on Sunday Tuesday Thursday Saturday Josesito Pratt MD   500 mg at 06/20/24 0800    chlorhexidine (PERIDEX) 0.12 % solution 15 mL  15 mL Mouth/Throat Q12H Chio Cortez MD   15 mL at 06/21/24 0822    fludrocortisone (FLORINEF) tablet 0.1 mg  0.1 mg Oral or Feeding Tube Daily Evelyn Roberts APRN CNP        heparin ANTICOAGULANT injection 5,000 Units  5,000 Units Subcutaneous Q8H Evelyn Roberts APRN CNP   5,000 Units at 06/20/24 0420    hydrocortisone sodium succinate PF (solu-CORTEF) injection 50 mg  50 mg Intravenous Q6H Evelyn Roberts APRN CNP        insulin aspart (NovoLOG) injection (RAPID ACTING)  1-7 Units Subcutaneous Q4H Awa Watt MD        [Held by provider] magnesium chloride CR tablet 1,070 mg  1,070 mg Oral Once per day on Sunday Tuesday Thursday Saturday Josesito Pratt MD        [Held by provider] metoprolol tartrate (LOPRESSOR) tablet 25 mg  25 mg Oral BID Velez Reyes, German, MD        minocycline (MINOCIN) 100 mg in sodium chloride 0.9 % 100 mL intermittent infusion  100 mg Intravenous Q12H Kajal Chong APRN  mL/hr at 06/20/24 2328 100 mg at 06/20/24 2328    montelukast (SINGULAIR) tablet 10 mg  10 mg Oral At Bedtime Velez Reyes, German, MD   10 mg at 06/20/24 2136    pantoprazole (PROTONIX) 2 mg/mL suspension 40 mg  40 mg Per Feeding Tube Daily Josesito Pratt MD        piperacillin-tazobactam (ZOSYN) 4.5 g vial to attach to  mL bag  4.5 g Intravenous Q6H Josesito Pratt MD   4.5 g at 06/21/24 0822    polyethylene glycol (MIRALAX) Packet 17 g  17 g Oral or Feeding Tube Daily Kajal Chong APRN CNP   17 g at 06/20/24 1531    posaconazole (NOXAFIL)   "tablet TBEC 300 mg  300 mg Per Feeding Tube Daily Josesito Pratt MD   300 mg at 06/20/24 1349    [Held by provider] predniSONE (DELTASONE) tablet 5 mg  5 mg Oral Daily Velez Reyes, German, MD   5 mg at 06/21/24 0822    protein modular (PROSOURCE TF20) packet 1 packet  1 packet Per Feeding Tube BID Awa Watt MD   1 packet at 06/21/24 0824    senna-docusate (SENOKOT-S/PERICOLACE) 8.6-50 MG per tablet 1 tablet  1 tablet Oral or Feeding Tube BID Kajal Chong APRN CNP   1 tablet at 06/20/24 1933    sodium chloride (PF) 0.9% PF flush 10 mL  10 mL Intracatheter Q8H Kajal Chong APRN CNP        sodium chloride (PF) 0.9% PF flush 10 mL  10 mL Intracatheter Q8H Kajal Chong APRN CNP   10 mL at 06/21/24 0824    sodium chloride 0.9 % neb solution 3 mL  3 mL Nebulization BID Hayden Lou MD   3 mL at 06/20/24 2031    sulfamethoxazole-trimethoprim (BACTRIM) 400-80 MG per tablet 1 tablet  1 tablet Oral or Feeding Tube Daily Josesito Pratt MD   1 tablet at 06/20/24 1933    tacrolimus (GENERIC) suspension 0.6 mg  0.6 mg Oral QAM Velez Reyes, German, MD   0.6 mg at 06/21/24 0822    And    tacrolimus (GENERIC) suspension 0.7 mg  0.7 mg Oral QPM Velez Reyes, German, MD   0.7 mg at 06/20/24 1810    Vitamin D3 (CHOLECALCIFEROL) tablet 50 mcg  50 mcg Oral Once per day on Monday Wednesday Friday Velez Reyes, German, MD   50 mcg at 06/21/24 0834         Laboratory Data:   No results found for: \"ACD4\"    Inflammatory Markers    Recent Labs   Lab Test 06/18/24  1418 10/29/23  0642 10/28/23  0725 10/07/19  1221 10/06/19  1444 09/13/19  0934 09/11/19  2325 08/22/19  0820   SED 10 66* 58* 93*  --  111* 102*  --    CRP  --   --   --   --  25.0* 58.0* 66.0* 47.0*       Immune Globulin Studies     Recent Labs   Lab Test 06/19/24  0839 10/27/23  0701 10/24/23  1033 09/15/21  0915 01/31/21  0441 01/25/21  0406 10/27/19  0621 03/01/18  0356 02/19/18  0759    979  --  1,198 763  --  936 698 1,790*   IGM  --   --   --   -- "   --   --   --   --  502*   IGE  --   --  <2  --   --  <2  --   --  <2   IGA  --   --   --   --   --   --   --   --  425*   IGG1  --   --   --   --   --   --   --   --  1,300*   IGG2  --   --   --   --   --   --   --   --  131*   IGG3  --   --   --   --   --   --   --   --  101   IGG4  --   --   --   --   --   --   --   --  1*       Metabolic Studies       Recent Labs   Lab Test 06/21/24  0356 06/21/24  0120 06/20/24  2333 06/20/24  1942 06/20/24  1506 06/20/24  0733 06/20/24  0420 06/19/24  2359 06/19/24  0839 06/19/24  0613 06/18/24  1942 06/18/24  1803 10/28/23  0725 10/27/23  0701 06/27/22  1013 05/26/22  0750   *  134*  --   --   --  133*  --  133* 134*  --  134*  --  137   < > 133*   < > 137   POTASSIUM 3.6  3.6  --   --   --  3.8  --  3.7 3.6  --  3.5  --  4.1   < > 3.5   < > 3.5   CHLORIDE 103  103  --   --   --  101  --  99 100  --  98  --  100   < > 95*   < > 96   CO2 20*  20*  --   --   --  20*  --  24 24  --  22  --  27   < > 26   < > 32   ANIONGAP 11  11  --   --   --  12  --  10 10  --  14  --  10   < > 12   < > 9   BUN 32.3*  32.3*  --   --   --  40.7*  --  34.5* 33.0*  --  21.8  --  17.8   < > 16.0   < > 42*   CR 2.72*  2.72*  --   --   --  4.04*  --  4.27* 4.31*  --  3.74*  --  3.33*   < > 3.00*   < > 3.98*   GFRESTIMATED 19*  19*  --   --   --  12*  --  11* 11*  --  13*  --  15*   < > 17*   < > 12*   *  155*  155*  --  101* 116* 122*  123*   < > 131* 130*   < > 121*   < > 100*   < > 100*   < > 110*   A1C  --   --   --   --   --   --   --   --   --   --   --   --   --   --   --  5.2   CHELSEY 7.9*  7.9*  --   --   --  7.5*  --  7.9* 7.7*  --  8.0*  --  8.5*   < > 8.9   < > 9.5   PHOS 2.6  --   --   --  2.5  --  1.9*  --   --   --   --  4.1   < > 2.3*   < >  --    MAG 2.2  --   --   --  1.6*  --  1.5*  --   --   --   --  2.0   < >  --    < > 1.8   LACT  --  0.9  --   --   --   --   --  1.3   < >  --   --  0.6*   < >  --    < >  --    CKT  --   --   --   --   --   --   --   --    --   --   --  107  --  69  --   --     < > = values in this interval not displayed.       Hepatic Studies    Recent Labs   Lab Test 06/21/24  0356 06/20/24  1506 06/20/24  0420 06/19/24  0613 06/18/24  1803 06/18/24  1418 11/21/23  0752 11/04/23  0653 09/15/21  0915 05/01/21  0654 01/27/21  0414 01/26/21  0425   BILITOTAL 1.1  --  0.9 0.8 0.6 0.6 0.8 0.5   < >  --    < > 0.8   ALKPHOS 123  --  153* 175* 235* 237* 208* 179*   < >  --    < > 184*   ALBUMIN 2.4*  2.4* 2.6* 2.7* 2.7* 3.6 3.6 3.8 3.2*   < >  --    < > 2.0*   AST 12  --  26 31 39  --  27 22   < >  --    < > 11   ALT 20  --  23 28 39 39 23 24   < >  --    < > 30   LDH  --   --   --   --   --   --   --   --   --  164  --  187   GGT  --   --   --   --  147*  --   --   --   --   --   --   --     < > = values in this interval not displayed.       Pancreatitis testing    Recent Labs   Lab Test 06/18/24  1803 10/25/23  1356 05/26/22  0750 09/15/21  0915 01/24/21  0000 02/19/20  0713 12/31/19  1033 10/24/19  2032 10/21/19  1451 10/06/19  1444 09/11/19  2325 03/04/18  0353 02/19/18  0759   AMYLASE  --   --   --   --   --   --   --   --   --   --   --   --  58   LIPASE 40 24  --   --   --   --   --  212 119 172 127  --   --    TRIG  --   --  155* 68 242* 77 98  --   --   --   --  191* 115       Hematology Studies      Recent Labs   Lab Test 06/21/24  0356 06/20/24  0420 06/19/24  2359 06/19/24  0613 06/18/24  1834 06/18/24  1803 06/18/24  1418 03/05/24  0925 11/21/23  0752 11/04/23  0653 11/03/23  0556   WBC 5.8 6.0 5.5 3.2* 4.0 4.7   < > 1.0*   < > 11.9* 8.8   ANEU 5.1 5.3 4.8  --   --   --   --  0.2*  --  10.7* 7.7   ALYM 0.1* 0.1* 0.1*  --   --   --   --  0.6*  --  0.6* 0.4*   SHERRI 0.5 0.5 0.4  --   --   --   --  0.2  --  0.2 0.3   AEOS 0.1 0.1 0.1  --   --   --   --  0.0  --  0.1 0.2   HGB 9.6* 10.4* 10.1* 9.6* 11.2* 12.1   < > 10.5*   < > 8.5* 7.9*   HCT 29.8* 32.2* 31.1* 30.9* 36.9 39.5   < > 33.3*   < > 26.8* 24.8*   PLT 98* 109* 102* 106* 147* 123*   < >  "96*   < > 256 204    < > = values in this interval not displayed.       Arterial Blood Gas Testing    Recent Labs   Lab Test 06/21/24  0817 06/19/24  2359 06/19/24  0614 06/18/24  2348 10/17/23  1035 02/10/21  2000 02/10/21  1555 02/09/21  1225 02/09/21  0403   PH 7.33*  --   --   --   --  7.41 7.36 7.38 7.42   PCO2 44  --   --   --   --  42 46* 49* 44   PO2 103  --   --   --   --  90 105 74* 116*   HCO3 23  --   --   --   --  26 26 29* 28   O2PER 50 30 45 40   < > 50 50 60 60.0    < > = values in this interval not displayed.        Urine Studies     Recent Labs   Lab Test 06/19/24  1214 01/24/21  1729 10/21/19  2240 09/12/19  0125 08/07/19  1512   URINEPH 6.5 5.0 5.0 7.5* 7.0   NITRITE Negative Negative Negative Negative Negative   LEUKEST Large* Moderate* Large* Negative Trace*   WBCU >182* 34* 115* 3 19*       Vancomycin Levels     Recent Labs   Lab Test 06/19/24  0613 10/26/23  0606 09/21/21  1911 01/28/21  0412 01/26/21  0425 01/25/21  1259   VANCOMYCIN 15.6 18.6 18.8 21.6 31.4* 15.1       Tobramycin levels     No lab results found.    Gentamicin levels    No lab results found.    Tacrolimus levels    Invalid input(s): \"TACROLIMUS\", \"TAC\", \"TACR\"      Latest Ref Rng & Units 6/21/2024     3:56 AM 6/20/2024     3:06 PM 6/20/2024     4:20 AM 6/19/2024    11:59 PM 6/19/2024     6:13 AM   Transplant Immunosuppression Labs   Creat 0.51 - 0.95 mg/dL  0.51 - 0.95 mg/dL 2.72     2.72  4.04  4.27  4.31  3.74    Urea Nitrogen 8.0 - 23.0 mg/dL  8.0 - 23.0 mg/dL 32.3     32.3  40.7  34.5  33.0  21.8    WBC 4.0 - 11.0 10e3/uL 5.8   6.0  5.5  3.2    Neutrophil % 88   89  88  84    ANEU 1.6 - 8.3 10e3/uL 5.1   5.3  4.8         Cyclosporine levels    Invalid input(s): \"CYCLOSPORINE\", \"CYC\"    Mycophenolate levels    Invalid input(s): \"MYPA\", \"MYP\"    Sirolimus levels    Invalid input(s): \"SIROLIMUS\", \"SIR\", \"RAPA\"    CSF testing   No lab results found.      Microbiology:  6/19  CrAg - negative  Urine Strep and Legionella " antigens - negative  Urine culture - GNB  BAL - negative KOH, pending aerobic + fungal + AFB cultures    6/18  Negative RVP and COVID/flu/RSV  Histoplasma antigen (blood) - pending  Aspergillus galactomannan - negative  Fungitell - negative  Blood cultures x2 - NGTD    Sputum culture - Stenotrophomonas maltophilia      Fungal testing  Recent Labs   Lab Test 06/18/24  1803 10/26/23  1318 10/17/23  1105 08/17/23  1144 10/07/19  1544 09/14/19  1625   FGTL <31   < >  --   --    < > 122   FGTLI Negative   < >  --   --    < > Positive*   PJRDFA  --   --  Not Detected Not Detected   < >  --    ASPGAI 0.03   < >  --  0.05   < > 1.08   ASPAG  --   --   --  Negative   < >  --    ASPGAA Negative   < >  --   --    < > Positive*   CIABB  --   --   --   --   --  <1:2    < > = values in this interval not displayed.       Last Culture results   S Pneumoniae Antigen   Date Value Ref Range Status   01/24/2021   Final    Negative, no Streptococcus pneumoniae antigen detected by immunochromatographic membrane   assay. A negative Streptococcus pneumoniae antigen result does not rule out infection with   Streptococcus pneumoniae.       Streptococcus pneumoniae antigen   Date Value Ref Range Status   06/19/2024 Negative Negative Final     Comment:     A negative Streptococcus pneumoniae antigen result does not rule out infection with Streptococcus pneumoniae.     P. jirovecii By PCR   Date Value Ref Range Status   10/17/2023 Not Detected  Final     Comment:     NOT DETECTED - A negative result does not rule out the   presence of PCR inhibitors in the patient specimen or   assay specific nucleic acid in concentrations below the   level of detection by the assay.    This test was developed and its performance characteristics   determined by [facility]. It has not been cleared or   approved by the [place] Food and Drug Administration. This test   was performed in a CLIA certified laboratory and is   intended for clinical  purposes.  Performed By: [facility]  [address] [place], [place] [id number]  : Rogelio Llanos MD, PhD  CLIA Number: [id number]   08/17/2023 Not Detected  Final     Comment:     NOT DETECTED - A negative result does not rule out the   presence of PCR inhibitors in the patient specimen or   assay specific nucleic acid in concentrations below the   level of detection by the assay.    This test was developed and its performance characteristics   determined by [facility]. It has not been cleared or   approved by the US Food and Drug Administration. This test   was performed in a CLIA certified laboratory and is   intended for clinical purposes.  Performed By: [facility]  [address] [place] [address]  : Rogelio Llanos MD, PhD  CLIA Number: [id number]   08/17/2023 Not Detected  Final     Comment:     NOT DETECTED - A negative result does not rule out the   presence of PCR inhibitors in the patient specimen or   assay specific nucleic acid in concentrations below the   level of detection by the assay.    This test was developed and its performance characteristics   determined by [facility]. It has not been cleared or   approved by the US Food and Drug Administration. This test   was performed in a CLIA certified laboratory and is   intended for clinical purposes.  Performed By: [facility]  [address] [place] [address]  : Rogelio Llanos MD, PhD  CLIA Number: [id number]   01/24/2021 Detected (A)  Final     Comment:     (Note)  DETECTED - P. jirovecii DNA detected in this specimen.  Results should be used to aid in the diagnosis of PCP  pneumonia and must be interpreted in the context of host  risk factors, clinical presentation, and radiographic  imaging.  TEST INFORMATION: Pneumocystis jirovecii Detection by PCR  Test developed and characteristics determined by [facility]. See  Compliance Statement B: [facility]  Performed by [employer],  [address] [facility] [address] [phone]  [email or web address], Carri Red MD, Lab. Director       Culture   Date Value Ref Range Status   06/19/2024 Culture in progress  Preliminary   06/19/2024 1+ Non lactose fermenting gram negative bacilli (A)  Preliminary   06/19/2024 No growth after 1 day  Preliminary   06/19/2024 Culture in progress  Preliminary   06/19/2024 >100,000 CFU/mL Gram negative bacilli (A)  Preliminary   06/19/2024 >100,000 CFU/mL Enterococcus faecium VRE (A)  Preliminary     Comment:     Preliminary result, confirmation pending.   06/18/2024 3+ Normal alo  Preliminary   06/18/2024 2+ Stenotrophomonas maltophilia (A)  Preliminary   06/18/2024 2+ Non lactose fermenting gram negative bacilli (A)  Preliminary   06/18/2024 No growth after 2 days  Preliminary   06/18/2024 No growth after 2 days  Preliminary   02/15/2024 No Actinomyces like species isolated  Final   02/15/2024 No Growth  Final   02/15/2024 No Growth  Final   02/15/2024 1+ Normal alo  Final   11/01/2023 No Growth  Final   11/01/2023 Aspergillus ochraceus (A)  Final   11/01/2023 1+ Normal alo  Final   11/01/2023 1+ Stenotrophomonas maltophilia (A)  Final   10/26/2023   Final    >10 Squamous epithelial cells/low power field indicates oral contamination. Please recollect.     GS Culture   Date Value Ref Range Status   06/19/2024 See corresponding culture for results  Final     Culture Micro   Date Value Ref Range Status   05/01/2021   Final    Quantity not sufficient  Called to Maxim Norman at 1236 5/1/21.    mlb     05/01/2021 Culture negative after 30 days  Final   04/29/2021 No anaerobes isolated  Final   04/29/2021 No growth  Final   02/19/2021 Culture negative for acid fast bacilli  Final   02/19/2021   Final    Assayed at [facility] [address] [place] [address] [phone]   02/19/2021 Culture negative after 4 weeks  Final   02/19/2021 No  growth after 4 weeks  Final   02/19/2021 Moderate growth  Staphylococcus epidermidis   (A)  Final   02/09/2021 No growth  Final         Last check of C difficile  C Diff Toxin B PCR   Date Value Ref Range Status   02/13/2021 Negative NEG^Negative Final     Comment:     Negative: C. difficile target DNA sequences NOT detected, presumed negative   for C.difficile toxin B or the number of bacteria present may be below the   limit of detection for the test.  FDA approved assay performed using [employer] real-time PCR.  A negative result does not exclude actual disease due to C. difficile and may   be due to improper collection, handling and storage of the specimen or the   number of organisms in the specimen is below the detection limit of the assay.       C Difficile Toxin B by PCR   Date Value Ref Range Status   10/31/2023 Negative Negative Final     Comment:     A negative result does not exclude actual disease due to C. difficile and may be due to improper collection, handling and storage of the specimen or the number of organisms in the specimen is below the detection limit of the assay.       Virology:  Coronavirus-19 testing    Recent Labs   Lab Test 06/18/24  1729 06/18/24  1433 06/17/24  1337 07/11/23  1132 10/12/22  1528 09/26/22  0956 05/23/22  1013 04/01/22  1257 02/07/22  1301 01/31/22  1309 01/24/22  1324 09/20/21  1859 06/07/21  1310 05/02/21  0715 04/26/21  1151 04/08/21  0723   TAO93HS  --   --   --   --   --   --   --   --   --   --   --   --   --  Negative  --   --    [id number]  --   --   --   --   --   --   --   --   --   --   --   --   --  Not Applicable  --   --    [id number] Negative Negative  --  Negative  --   --   --   --   --   --   --  Negative   < >  --   --  Test received-See reflex to IDDL test SARS CoV2 (COVID-19) Virus RT-PCR  NEGATIVE   [id number]  --   --   --   --   --   --   --   --   --   --   --   --   --   --   --  Nasopharyngeal   [id number]  --   --   --   --   --   --    --   --   --   --   --   --   --   --   --  Nasopharyngeal   COVIDPCREXT  --   --  Negative  --  Negative Negative Undetected   < > Undetected Undetected   < >  --    < >  --   --   --    SOUREXT  --   --   --   --   --   --  Nasopharynx  --  Nasopharynx Nasopharynx   < >  --    < >  --    < >  --    [id number]  --   --   --   --   --   --  Undetected  --  Undetected Undetected   < >  --    < >  --    < >  --     < > = values in this interval not displayed.       Respiratory virus (non-coronavirus-19) testing    Recent Labs   Lab Test 06/18/24  1729 06/18/24  1433 11/01/23  0920 10/27/23  0449 01/24/21  1822 01/24/21  1629 12/23/20  1102 02/27/18  1016 02/22/18  0900   RVSPEC  --   --   --   --   --  Bronchial Bronchial   < >  --    AFLU  --   --   --   --   --   --   --   --  Duplicate request*   IFLUA Not Detected  --  Not Detected Not Detected   < > Negative Negative   < >  --    INFZA Negative   < >  --   --   --   --   --   --  Negative   FLUAH1 Not Detected  --  Not Detected Not Detected   < > Negative Negative   < >  --    [id number] Not Detected  --  Not Detected Not Detected   < > Negative Negative   < >  --    FLUAH3 Not Detected  --  Not Detected Not Detected   < > Negative Negative   < >  --    BFLU  --   --   --   --   --   --   --   --  Duplicate request*   IFLUB Not Detected  --  Not Detected Not Detected   < > Negative Negative   < >  --    INFZB Negative   < >  --   --   --   --   --   --  Negative   PIV1 Not Detected  --  Not Detected Not Detected   < > Negative Negative   < >  --    PIV2 Not Detected  --  Not Detected Not Detected   < > Negative Negative   < >  --    PIV3 Not Detected  --  Not Detected Not Detected   < > Negative Negative   < >  --    PIV4 Not Detected  --  Not Detected Not Detected   < >  --   --    < >  --    IRSV Negative   < >  --   --   --   --   --   --  Negative   HRVS  --   --   --   --   --  Negative Negative   < >  --    RSVA Not Detected  --  Not Detected Not Detected    < > Negative Negative   < >  --    RSVB Not Detected  --  Not Detected Not Detected   < > Negative Negative   < >  --    HMPV Not Detected  --  Not Detected Not Detected   < > Negative Negative   < >  --    ADVBE  --   --   --   --   --  Negative Negative   < >  --    ADVC  --   --   --   --   --  Negative Negative   < >  --    ADENOV Not Detected  --  Not Detected Not Detected   < >  --   --    < >  --    CORONA Not Detected  --  Not Detected Not Detected   < >  --   --    < >  --     < > = values in this interval not displayed.       CMV viral loads    Recent Labs   Lab Test 06/18/24  1803 06/12/24  0828 03/13/24  0853 03/05/24  0925 02/21/24  0838 02/14/24  1050 12/27/23  0834 12/19/23  1138 11/29/23  0826 11/21/23  0752 11/08/23  0846 11/01/23  0920 10/31/23  0606 10/24/23  1033 10/17/23  1011 10/04/23  0810 09/07/23  0710 08/17/23  1144 08/17/23  1137 08/08/23  0828 08/08/23  0828 07/11/23  0952 05/26/23  0828 04/06/23  0725 03/08/23  0848 02/09/23  1034 11/18/22  0928 09/27/22  1012 03/15/21  0904 02/25/21  0542   CMVQNT <35*  --   --  Not Detected  --   --   --  Not Detected  --  Not Detected  --   --  <35*  --   --   --   --   --   --   --   --  Not Detected  --  Not Detected  --  <137*  --  Not Detected   < > CMV DNA Not Detected   CMVRESINST  --   --   --   --   --  350*  --   --   --   --   --   --   --  103*  80* 75*  --  521*  --   --   --  46*  --   --   --   --   --   --   --   --   --    CSPEC  --   --   --   --   --   --   --   --   --   --   --   --   --   --   --   --   --   --   --   --   --   --   --   --   --   --   --   --   --  EDTA PLASMA   CMVLOG <1.5  --   --   --   --  2.5  --   --   --   --   --   --  <1.5 2.0  1.9 1.9  --  2.7  --   --    < > 1.7  --   --   --   --  <2.1  --   --    < > Not Calculated   511[address]  --  46*   < >  --    < >  --    < >  --    < >  --    < >  --   --   --   --    < >  --   --   --   --   --   --    < >  --    < >  --    < >  --    < >  --    CMVQAL  --   --    "--   --   --   --   --   --   --   --   --  Not Detected  --   --   --   --   --  Not Detected Not Detected  --   --   --   --   --   --   --   --   --   --   --     < > = values in this interval not displayed.       No results found for: \"H6RES\"    EBV DNA Copies/mL   Date Value Ref Range Status   05/01/2021 38,186 (A) EBVNEG^EBV DNA Not Detected [Copies]/mL Final   02/22/2021 75,709 (A) EBVNEG^EBV DNA Not Detected [Copies]/mL Final   01/25/2021 5,619 (A) EBVNEG^EBV DNA Not Detected [Copies]/mL Final   12/09/2020 10,686 (A) EBVNEG^EBV DNA Not Detected [Copies]/mL Final   09/09/2020 2,935 (A) EBVNEG^EBV DNA Not Detected [Copies]/mL Final   03/09/2020 8,918 (A) EBVNEG^EBV DNA Not Detected [Copies]/mL Final   02/19/2020 38,419 (A) EBVNEG^EBV DNA Not Detected [Copies]/mL Final   12/18/2019 11,933 (A) EBVNEG^EBV DNA Not Detected [Copies]/mL Final   11/11/2019 9,669 (A) EBVNEG^EBV DNA Not Detected [Copies]/mL Final   10/24/2019 13,391 (A) EBVNEG^EBV DNA Not Detected [Copies]/mL Final   08/01/2018 EBV DNA Not Detected EBVNEG^EBV DNA Not Detected [Copies]/mL Final       BK viral loads   Recent Labs   Lab Test 08/07/19  0959   BKSPEC Plasma   BKRES BK Virus DNA Not Detected         Imaging:  XR Chest Port 1 View  Result Date: 6/20/2024  Impression: Bilateral pulmonary opacities, mildly increased in the upper lobes suggesting increased edema and underlying infection. The endotracheal tube projects 2.3 cm above the gee.    XR Chest Port 1 View  Result Date: 6/19/2024  IMPRESSION: ET tube tip projects 1.6 cm above the gee. Small bilateral pleural effusions with bibasilar, right greater than left airspace opacities suspicious for infection versus atelectasis.     CT Chest Hi-Resolution wo Contrast  Result Date: 6/18/2024  IMPRESSION: Bilateral transplanted lungs. Slight decreased confluent consolidative opacities in right lower lobe with more micronodular component of possible infection in the right lower lobe. Other " abnormalities throughout the lungs similar to February of the transplanted lungs. Pleural effusions again noted. Narrowing of the bronchus intermedius on the right there appears to be occlusion of the right middle lobe bronchus with narrowing of the distal aspect of the right lower lobe bronchus with short segment occlusions of the medial and posterior basilar bronchial segments to the right lower lobe. No such abnormality on the left.. No ectopic air. Anastomoses appear grossly intact bilaterally with just under approximate 50% narrowing distal to the right mainstem anastomosis proximal to the upper lobe bronchus takeoff. This right middle lobe bronchial occlusion is new from February. Mitral annular calcifications with left atrial prominence, please correlate for arrhythmia. Borderline pulmonary enlargement concerning for possible pulmonary hypertension. Borderline mid ascending aortic ectasia.       ECHO:  Echo Complete  Result Date: 6/19/2024  Interpretation Summary Global and regional left ventricular function is normal with an EF of 55-60%. Right ventricular function, chamber size, wall motion, and thickness are normal. The inferior vena cava cannot be assessed. No pericardial effusion is present. No significant valvular abnormalities present.     DAVIDA RAMAN PA-C  [facility]  Contact information available via [facility] Paging/Directory

## 2024-06-21 NOTE — PROCEDURES
Procedure:   Bronchoscopy with BAL        Indication:   Acute hypoxic respiratory failure, worsening left sided pulmonary infiltrates        Consent:   Obtained from the patient/family.  Risks, benefits and alternatives discussed. Risks include bleeding, infection, respiratory failure, vocal cord trauma/paralysis and pneumothorax.  In general, severe complications and death with bronchoscopy described as 0.6% and 0.013%, respectively.  Additional risks are related to conscious sedation/general anesthesia involve bradycardia, arrhythmia, hypotension.  The patient and family was in agreement to proceed with the procedure.       Pre-medication:   The patient is on mechanical ventilation with ICU sedation, see MAR for details.  Lidocaine 1%: through the ETT cc, vocal cords cc, subglottic cc  Bolus medications: Versed 1 mg, Fentanly 50 mcg        Procedure Summary:   Time out was performed.   The bronchoscope inserted through the ETT    Airway examination:  Exam of trachea and bronchus of the left bronchial tree to the sub-segmental level revealed normal mucosa, normal left anastomosis, no endobronchial lesions and scant clear secretions. The stent in the right mainstem appeared to be in the same location and patent.    Procedure:  BAL performed in the lingula. A total of  90 cc saline instilled in two aliquotes and 30 cc of cloudy cellular fluid with very small plugs. BAL was sent for cell count, bacterial culture, viral culture, fungal and AFB culture, cytology.    The patient tolerated the procedure well without undue discomfort, hypotension or arrhythmia. The procedure was performed in the ICU and vital sign parameters were monitored.        Complications:   No immediate complications.    This procedure was performed by Randy Morse MD  This procedure was supervised by Gissell Sanchez MD who was present for the entire procedure.    Randy Morse MD  Pulmonary and Critical Care Fellow  Pager: [phone]

## 2024-06-21 NOTE — PROGRESS NOTES
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     Changes today  - Start daptomycin, levofloxacin, meropenem; stop Zosyn  - Start hydrocortisone q6H, fludrocortisone every day  - Lung protective ventilation () and recruitment (PEEP 8)  - CRRT net even  - Restart chest percussive therapy ([place] BID)    PLAN:    Neuro:  # Pain   - Fentanyl gtt  - Acetaminophen 325mg q4H    # Sedation  - Quetiapine 25mg at bedtime PRN  - Fentanyl gtt  - Propofol gtt - discontinued as has not used for >24 hours  - Seroquel 25mg at bedtime PRN  - RAAS goal 0 to -1    # Toxic Metabolic Encephalopathy   Presented with 2-3 days of lethargy, decreased appetite, and fatigue. Previously has improved with BiPAP/intubation. Ongoing lethargy in setting of sepsis; workup and treatment as below.   - Monitor symptoms  - Infections workup/management as below  - ABG/VBG to assess for retention with vent changes/change in oxygen saturations     Pulmonary:  # Acute on chronic hypoxic hypercapnic respiratory failure, worsening  # ARDS  Presented to the ED on 6/18/2024 with SOB and hypercapnic respiratory failure. Workup significant for Stenotrophomonas pneumonia. Acute on chronic hypoxic hypercapnic respiratory failure likely secondary to pneumonia with potential exacerbation by pulmonary edema (diffuse bilateral opacities on CXR, uptrending patient weight 50kg to 56kg). On 6/21, oxygenation and lung pressures consistent with ARDS physiology (P/F ratio  206, elevated plateau pressures 30-34). Will plan to continue antibiotics and aggressive pulmonary toilet, volume management (net even with CRRT I/O), and lung protective ventilation () to reduce risk of barotrauma while increasing PEEP (8) to maximize pulmonary recruitment.   - Antibiotics, as below  - Volume management with CRRT, as below  - Mechanical ventilation, wean as able   Daily SBT trial   Lung protective ventilation (tidal volume 280)   Increase PEEP 8 to improve recruitment  - Pulmonary toilet   Mucomyst QID   Albuterol nebs QID   [place] BID    # Stenotrophomonas pneumonia  # Positive bronchoscopy galactomannan  Presented to the ED on 6/18/2024 with SOB and hypercapnic respiratory failure. Found on bronchoscopy (6/19) to have RML obstruction by narrowed bronchus intermedius as well as copious mucopurulent secretions/mucus plugging. Sputum culture (6/18) is growing Stenotrophonomas pneumonia. BAL cultures (6/19) pending. Negative serum galactomannan (6/18) with positive bronchoscopy galactomannan (6/19 1.87) Previously treated for Stenotrophonomas/aspergillus pneumonia in 11/2023 with subsequent sputum culture (2/2024) negative for both Stenotrophonomas/Aspergillus. Can consider repeat Stenotrophomonas infection vs opportunistic infection from colonized microbe. Lower suspicion for aspergillus pneumonia (has been on posaconazole chronically) but this is possible. Plan to continue antibiotics, pulmonary toilet. With worsening respiratory status, can consider broadening empiric antibiotics from Zosyn to Meropenem  - Transplant pulmonology consult, appreciate recs  - Transplant ID consult, appreciate recs  - Antibiotics   S/p vancomycin (6/18-6/20)   S/p zosyn (6/18- 6/21)  PTA posaconazole 300mg every day (treatment dose)   Meropenem (6/21- present) in discussion with transplant ID    Levofloxacin (6/21-present) per transplant ID  Minocycline (6/20- present)   - With worsening respiratory status, can  consider broadening antibiotics to Meropenem to empirically cover for ventilator-associated pneumonia     Vent Mode: CMV/AC  (Continuous Mandatory Ventilation/ Assist Control)  FiO2 (%): 40 %  Resp Rate (Set): 19 breaths/min  Tidal Volume (Set, mL): 300 mL  PEEP (cm H2O): 10 cmH2O  Resp: (!) 32     # Recurrent right middle bronchus stenosis s/p dilation and stent (6/20/2024)  Has history (bronchoscopy 2/15/24) demonstrating narrowing of right mainstem transplant anastomosis and bronchus intermedius. CT chest (6/18/2024) and bronchoscopy (6/19/2024) demonstrated occlusion of right middle bronchus. With concern for post-obstructive pneumonia, interventional pulmonology was consulted, and completed bronchoscopy (6/20/2024) with tissue debulking, right middle bronchus balloon dilation to 11 mm, stent placement in right main bronchus, and bifurcating cast placement in right upper bronchus. Plan for saline nebs BID and outpatient bronchoscopy in 6 weeks.   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 Per transplant, will stay at current does and reassess after rechecking levels tomorrow AM.  - Transplant pulm consult, appreciate recs  - PTA nebs               Fluticasone-viilanterol (breo ellipta) every day - HOLD with respiratory infection   Montelukast every day  - Immunosuppressants per Tx Pulm:                PTA Tacrolimus (dosing per tx pulm), recheck levels on 6/21               PTA Prednisone 5 mg daily - HOLD with stress dose steroids   PTA azathioprine - HOLD per Transplant pulmonology with repeat infections    Cardiovascular:  # Shock, worsening  On 6/19/2024, developed sepsis (hypotension 80s/50s, tachypnea 24) in the setting of stenotrophomonas pneumonia/enterococcus faecium VRE UTI. Etiology of shock likely  distributive; less likely hypovolemic (not pulling volumes with CRRT), medication-associated (weaned off of propofol gtt) or obstructive (low suspicion for PE, echo 6/19 without evidence of cardiac dysfunction). Progressively uptrending pressor needs, may be secondary to lack of source control vs untreated infection. With ongoing increased pressor needs, can consider need to broaden antibiotics.  - IV pressors   Norepi gtt   Vasopressin gtt  - MAP goal >65  - Stress dose steroids   Hydrocortisone 50mg q6H (6/21-present)   Fludrocortisone 0.1mg every day (6/21-present)  - With worsening respiratory status, can consider broadening antibiotics to Meropenem to empirically cover for ventilator-associated pneumonia    # Demand Ischemia   Presented with elevated troponin (peak 499). Not associated with chest pain or hypoxia. EKG without ST elevations/depressions or T wave inversions. Suspect demand ischemia in the setting of sepsis.   - Monitor symptoms  - Telemetry     # Hx Paroxysmal A-Fib  # Hx Pulmonary Hypertension (45 mmHg)  History of pulmonary hypertension (45 mmHg, echo 10/2023) and paroxysmal afib. Not on anticoagulation for afib.   - PTA metoprolol tartrate 25mg BID - HOLD with soft pressures    GI/Nutrition:  # Nutrition  NJT placed (6/19/2024).   - Nutrition consult   Tube feeds 30ml/hr    # Elevated alk phos, resolved  Admission with elevated alkaline phosphatase (237) with elevated GGT consistent with hepatic etiology. Not associated with transaminitis. Etiology may be medication associated vs cholestatic. Now resolved.  - Monitor LFTs    Renal/Fluids/Electrolytes:  # ESRD on iHD (M,W,F)  History of ESRD 2/2 Tacrolimus toxicity on HD (MW). With soft blood pressures, plan for CRRT. RIJ catheter placed (6/20/2024) at CRRT started that day.  - Nephrology consult   CRRT  - Renally-dosed medications  - RN electrolyte replacement       Endocrine:  # Risk for hyperglycemia with stress-dose steroids  - MSSI      ID:  # Stenotrophomonas pneumonia  # Positive bronchoscopy galactomannan  Presented to the ED on 6/18/2024 with SOB and hypercapnic respiratory failure. Found on bronchoscopy (6/19) to have RML obstruction by narrowing bronchus intermedius as well as copious mucopurulent secretions/mucus plugging. Sputum culture (6/18) is growing Stenotrophonomas pneumonia. BAL cultures (6/19) pending. Positive serum galactomannan (6/19 1.87) Previously treated for Stenotrophonomas/aspergillus pneumonia in 11/2023 with subsequent sputum culture (2/2024) negative for both Stenotrophonomas/Aspergillus. Can consider repeat Stenotrophomonas infection vs opportunistic infection from colonized microbe. Lower suspicion for aspergillus pneumonia (has been on posaconazole chronically) but this is possible. Plan to continue antibiotics, pulmonary toilet. With worsening respiratory status, can consider broadening empiric antibiotics from Zosyn to Meropenem  - Transplant pulmonology consult, appreciate recs  - Transplant ID consult, appreciate recs  - Antibiotics   S/p vancomycin (6/18-6/20)   S/p zosyn (6/18- 6/21)  PTA posaconazole 300mg every day (treatment dose)   Meropenem (6/21- present) in discussion with transplant ID    Levofloxacin (6/21-present) per transplant ID  Minocycline (6/20- present)  - Workup   PJP   Coccidiodes  - With worsening respiratory status, can consider broadening antibiotics to Meropenem to empirically cover for ventilator-associated pneumonia    # UTI, Enterococcus faecium VRE   # UTI, Non-lactose fermenting GNR  With progressive IV pressor needs, obtained broad infectious workup which demonstrated UCx (6/19/2024) with Enterococcus faecium VR and non-lactose fermenting GNR (e.g. could be pseudomonas or proteus). With worsening shock, will treat VRE, follow GNR speciation/sensitivities.  - Antibiotics   Daptomycin (6/21 - present) (does not have pulmonary penetration)    # Hx Aspergillus PNA (11/2023)  # Hx PJP  PNA (1/2021)  # Hx CMV viremia, recurrent  # Hx EBV viremia  - Transplant ID consult, appreciate recs  PTA posaconazole (Treatment for hx of aspergillus PNA)  PTA azithromycin 250mg qd (CLAD ppx)  PTA bactrim (PJP ppx)     Hematology:    # Acute on Chronic Normocytic Anemia  # Thrombocytopenia, chronic  History of chronic anemia in the setting of kidney disease (baseline Hgb 10-11). Upon admission, Hgb 9. May be bone marrow suppression in the setting of chronic illness; less likely blood loss. Peripheral smear (6/18/2024) without evidence of schistocytes.  - Monitor CBC     Musculoskeletal:  - PT / OT consult     Skin:  - No acute concerns.     General Cares/Prophylaxis:    DVT Prophylaxis: Heparin SQ  GI Prophylaxis: PPI  Restraints: None  Family Communication:  at bedside  Code Status: Full Code     Lines/tubes/drains:  - PIV x2, midline,   - RIJ  - A line  - NGT  - ETT    Disposition:  - Medical ICU     Patient seen and findings/plan discussed with medical ICU staff, Dr. Pratt.    Awa Watt MD      Clinically Significant Risk Factors          # Hypocalcemia: Lowest iCa = 4.2 mg/dL in last 2 days, will monitor and replace as appropriate   # Hypomagnesemia: Lowest Mg = 1.5 mg/dL in last 2 days, will replace as needed   # Hypoalbuminemia: Lowest albumin = 2.4 g/dL at 6/21/2024  3:56 AM, will monitor as appropriate   # Thrombocytopenia: Lowest platelets = 98 in last 2 days, will monitor for bleeding                 #Precipitous drop in Hgb/Hct: Lowest Hgb this hospitalization: 9.6 g/dL. Will continue to monitor and treat/transfuse as appropriate.         # Financial/Environmental Concerns: none              ====================================  INTERVAL HISTORY:   Feeling tired and sleeping most the day today. Denies difficulty breathing.     OBJECTIVE:   1. VITAL SIGNS:   Temp:  [98.3  F (36.8  C)-99.5  F (37.5  C)] 99.5  F (37.5  C)  Pulse:  [] 89  Resp:  [20-32] 32  MAP:  [55 mmHg-80 mmHg] 68  mmHg  Arterial Line BP: ()/(39-58) 110/44  FiO2 (%):  [30 %-50 %] 40 %  SpO2:  [86 %-100 %] 98 %  Vent Mode: CMV/AC  (Continuous Mandatory Ventilation/ Assist Control)  FiO2 (%): 40 %  Resp Rate (Set): 19 breaths/min  Tidal Volume (Set, mL): 300 mL  PEEP (cm H2O): 10 cmH2O  Resp: (!) 32    2. INTAKE/ OUTPUT:   I/O last 3 completed shifts:  In: 2634.62 [I.V.:1469.62; NG/GT:645]  Out: 875 [Other:875]    3. PHYSICAL EXAMINATION:  General: Laying in bed, NAD   HEENT: Atraumatic, moist mucous membranes   Neuro: Sedated, arouses to voice, follows commands, moving all extremities  Pulm/Resp: Lungs coarse with rhonchi R>L, breathing nonlabored on mechanical ventilation, minimal secretions from ET tube.   CV: RRR, no m/r/g   Abdomen: Soft, non-distended, non-tender  Ext: No edema, pulses 2+ radial, pedal  Incisions/Skin: No rashes or lesions    4. LABS:   Arterial Blood Gases   Recent Labs   Lab 06/21/24  0817   PH 7.33*   PCO2 44   PO2 103   HCO3 23     Complete Blood Count   Recent Labs   Lab 06/21/24  0356 06/20/24  0420 06/19/24  2359 06/19/24  0613   WBC 5.8 6.0 5.5 3.2*   HGB 9.6* 10.4* 10.1* 9.6*   PLT 98* 109* 102* 106*     Basic Metabolic Panel  Recent Labs   Lab 06/21/24  1638 06/21/24  1208 06/21/24  0356 06/20/24  1942 06/20/24  1506 06/20/24  0733 06/20/24  0420 06/19/24  2359   NA  --   --  134*  134*  --  133*  --  133* 134*   POTASSIUM  --   --  3.6  3.6  --  3.8  --  3.7 3.6   CHLORIDE  --   --  103  103  --  101  --  99 100   CO2  --   --  20*  20*  --  20*  --  24 24   BUN  --   --  32.3*  32.3*  --  40.7*  --  34.5* 33.0*   CR  --   --  2.72*  2.72*  --  4.04*  --  4.27* 4.31*   * 187* 158*  155*  155*   < > 122*  123*   < > 131* 130*    < > = values in this interval not displayed.     Liver Function Tests  Recent Labs   Lab 06/21/24  0356 06/20/24  1506 06/20/24  0420 06/19/24  0613 06/18/24  1803 06/18/24  1418   AST 12  --  26 31 39  --    ALT 20  --  23 28 39 39   ALKPHOS 123  --   153* 175* 235* 237*   BILITOTAL 1.1  --  0.9 0.8 0.6 0.6   ALBUMIN 2.4*  2.4* 2.6* 2.7* 2.7* 3.6 3.6   INR  --   --   --   --   --  0.98     Coagulation Profile  Recent Labs   Lab 06/18/24  1418   INR 0.98       5. RADIOLOGY:   No results found for this or any previous visit (from the past 24 hour(s)).

## 2024-06-22 NOTE — PLAN OF CARE
ICU End of Shift Summary. See flowsheets for vital signs and detailed assessment.    Changes this shift: Vent changes made. ABG improved. fiO2 40-50%, PEEP 8. Levo titrated to MAP >65, Vaso continues at 4. 500 LR bolus given. Pt continues on CRRT, unable to pull fluid d/t pressor needs. Radial art line now positional, bilateral lower extremity cuff pressures not accurate. Pt unable to communicate needs. Increasingly restless/agitated. Pt did not sleep overnight. Loose stools x5 this shift. Bowel meds held last night. Pt hyperglycemic, S/S increased.     Plan: Wean pressors as able. Meds to manage anxiety/restlessness and confusion. Continue to monitor labs.     Goal Outcome Evaluation:      Plan of Care Reviewed With: patient    Overall Patient Progress: no changeOverall Patient Progress: no change    Outcome Evaluation: Pt mostly in a-fib with RVR. Fluctuating pressor needs. 500 ml LR bolus given. Vent changes made. 12 lead EKG showing ST.

## 2024-06-22 NOTE — PROGRESS NOTES
[facility]    Transplant Infectious Diseases Inpatient Progress note      Kecia Blue MRN# [id number]   YOB: 1960 Age: 63 year old   Date of Admission: 6/18/2024  1:47 PM  Transplant: 3/1/2018 (Lung), Postoperative day: 2305            Recommendations:   Discontinue azithromycin, as she is on levofloxacin  Continue renally dosed/CRRT IV meropenem pending blood culture, urine GNR results. This should also cover E faecalis from BAL cultures.   Continue IV minocycline 100 mg q12 hrs and IV levofloxacin 500 mg q24 hrs renally dosed/CRRT for dual Stenotrophomonas coverage  Continue IV daptomycin 8 mg/kg/day for VRE faecium coverage in urine  Have added daptomycin susceptibility testing   Baseline CK 23 (6/21/24)  Dose adjust per pharmacy while on CRRT/per renal function  Follow up pending final cultures (blood, sputum, urine, BAL) and susceptibilities  Continue home posaconazole 300 mg daily  Continue home Bactrim 1 single strength three times weekly ppx for history of PJP        Basics of Transplant and Current Presentation:   Transplants:  3/1/2018 (Lung), Postoperative day:  2305     This patient is a 63 year old female with PMHx significant for ILD, 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         Active Problems and Infectious Diseases Issues:     Acute hypercapnic and hypoxic respiratory failure, intubated 6/18/24  Multilobar pneumonia, right  Right mainstem bronchial stenosis requiring dilations (dilated 2/15/24, s/p  6/20 dilation with stent placement)  History of Stenotrophomonas colonization (ceftazidime R, levo mixed susceptibilities; susceptible to Bactrim and minocycline)  On chronic 3 L oxygen at home. Opacities in lung since Oct 2023 (along with PFTs) in the setting of RMB stenosis and post obstructive pneumonia. Completed 2 months amoxicillin (03-05/2024) for the actinomyces that grew in Oct BAL without any significant clinical change. Recent sinus congestion suggests likely recent viral illness +  also sick, followed by increased secretions, weak cough, and hypercapnic respiratory failure concerning for pneumonia (likely bacterial vs post-obstructive) vs mucus plug. Viral workup (RVP, covid/flu/RSV) negative here. CT chest 6/18/24 with known right mainstem anastomosis and bronchus intermedius stenosis (last dilation 2/15/24) with new narrowing of R BI and RML occlusion, narrowing of distal RLL bronchus, and RLL consolidations. On vancomycin, zosyn, and azithromycin in ED. Vancomycin stopped 6/19 with negative MRSA nares. Lower suspicion for fungal or PJP etiology as she remains on home posaconazole and Bactrim ppx. Negative Fungitell and AGM. Bronchoscopy with BAL 6/19 notable for stenosis of R mainstem and proximal BI with RLL mucoid plugs removed, cultures pending with GNB. Had IP bronchoscopy intervention 6/20 - laser tissue debulking, dilation x2 (RMB and BI) and 1 stent placed. Sputum culture with Stenotrophomonas (known colonizer for her) and E faecalis. Covering Stenotrophomonas since 6/20 with minocycline with levofloxacin added on 6/21/24 given ongoing pressor requirements + purulent secretions. Pip/tazo--> meropenem on 6/21, given increasing pressor and oxygen/vent requirements - postobstructive PNA vs pulmonary edema vs ARDS.   - Continue meropenem pending blood culture results x48-72 hours. Note this will also cover E faecalis from BAL (although this is very unlikely to cause invasive disease).   -  Continue minocycline + levofloxacin for Stenotrophomonas   - Follow up pending workup: blood culture x2, sputum and BAL cultures     Pyuria, Urine culture with VRE and GNB (yet to be speciated)  Patient with ESRD on HD, makes very little urine. Straight cath for urinary specimens (Strep/Legionella antigens - negative), UA - abnormal with pyuria, and Ucx now with GNB and VRE faecium. No prior history of UTI. Does report some dysuria today but this is after straight cath. No abdominal/suprapubic tenderness, frequency, urgency. Difficult to interpret if true UTI vs asymptomatic bacteruria (ASB) in patient's with ESRD/HD. Likely this is ASB/colonization and broad spectrum antimicrobial use and residual small volume urine has selected for VRE. Given pressor needs, agree to VRE coverage for now though feel lungs are primarily driving her shock and pressor requirements. Daptomycin does not penetrate lungs, though no resistant gram positive has been isolated from sputum or BAL to warrant more broad pulmonary coverage. The meropenem should cover a broad range of GNB.   - Would continue IV daptomycin 8 mg/kg/day (adjust per pharmacy for CRRT/HD)   - Baseline CK = 23 (6/21)  - Added daptomycin susceptibilities today     History of left aspergillus empyema:  S/p drainage and vori (S to both vori and posa) in 2019, Calcified mass in left pleural cavity s/p biopsy 2020 with aspergillus on cx and path (S to both vori and posa). Vori changed to posaconazole. S/p left pleural effusion drainage April 2021 - exudative, fungal elements seen on KOH but fungal cx neg. Posa levels good and no recurrence of pleural effusion on Sep 2021 CT chest. Suspect that current left pleural effusion has been reactive to the calcified aspergillus elements there along with fluid disturbance related to dialysis. Do not think this is failure of anti-fungal therapy or recurrence of Aspergillus infection. It seems Dr. Layton has had discussions with surgery  for source control (removal of calcified mass), however, there is was concern for high morbidity with surgery and therefore it was decided to pursue long term posaconazole, which she remains on since 2020.   - Continue posaconazole 300 mg daily. Level pending.         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 After treatment and stopping of valcyte, recurrence of CMV viremia to 1k in 02/2024, resumed valcyte ppx (renally dosed) and negative since 2/28/24. Valcyte again held in March 2024, with repeats negative to date. CMV negative 6/19/24.     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 Decreased to 135k 2/2022. Neg 6/27/22.   Increased to 1 million in 11/2023 s/p 1 dose of rituximab and decreasing/undetected in 05/2024. Negative EBV 6/19/24.    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 BAL showed septate hyphae. 7/2020 had hypodense mass in left lung with biopsy showing fungal hyphae, cx aspergillus. Started on voriconazole in 2019, changed to posaconazole in 2020, she remains on 300 mg daily.  - 1/2021 BAL cx with steno: tx ceftazidime for 2 weeks  - 1/2021-2/2021 - ARDS due to PJP pneumonia (had stopped TMP-SMX due to side effects). Recovery from this required tracheostomy.  - 2019 - She had also grown Actinomyces from multiple BAL    Other Infectious Disease issues include:  - QTc interval:  450 msec on 6/18/24  - Bacterial prophylaxis:  azithromycin per Tx pulm  - Pneumocystis prophylaxis:  Bactrim for life (hx PJP)  - Viral serostatus & prophylaxis: CMV D+/R+, EBV D+/R+   - Fungal prophylaxis:  posaconazole  - Immunization status:  Seasonal influenza, RSV, and COVID vaccine UTD  - Gamma globulin status:  979 on 10/27/23  - Isolation status: contact for VRE      Attestation:  I interviewed the patient and obtained history from the  patient, patient's , bedside RN, and by reviewing the patient's chart including outside records, microbiological data, and radiological data. All data are summarized in this notes.  Lissett Lewis MD  [facility]  Contact information available via [facility] Paging/Directory or [employer]    06/22/2024    58 MINUTES SPENT BY ME on the date of service doing chart review, history, exam, documentation & further activities per the note.      Interim History and Events:   Remains intubated, but awake.   Is delirious today, very anxious.  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      History obtained from chart review, patient's , and patient. Reported increased lethargy reported for 2-3 days PTA, poor PO intake, more tired, and weak cough. Unable to bring up sputum. Denied fever, chills, urinary symptoms (on HD), diarrhea, abdominal pain at home. Her and  both reported sinus congestion recently,  improved quickly and Kecia declined. Denies sore throat, headache. Afebrile and without leukocytosis on admission.  WBC 4.0, ESR 10, lactic 0.6, and CRP ~8. Put on BIPAP given respiratory acidosis with some improvement, however unable to tolerate off BIPAP and intubated for failure to improve hypercapnia. Started on azithromycin, vancomycin, and zosyn. Negative: RVP, flu/covid/rsv, MRSA nares, Cryptococcus antigen. Pending: EBV, CMV, blood culture, fungitell, Histo Ag, AGM, and sputum culture     CT chest-hi res on 6/18/24 notable for slight decreased confluent consolidative opacities in right lower lobe with more micronodular component of possible infection in the right lower lobe. Other abnormalities throughout the lungs similar to February of the transplanted lungs - pleural effusions, anastomoses intact with narrowing of R mainstem anastomosis. New narrowing of right bronchus intermedius with occlusion of RML bronchus (mucus plug per pulm). Febrile 100.7F following intubation, WBC 3.2, CRP up to 19, procal 0.24 in ESRD, nl lactic. Remains intubated, though on PS. On pressor x1 norepi, low dose 0.05. Nursing reports lots of secretion burden and weak cough. Planning for bronchoscopy today.     Has previously scheduled stent placement on 6/26/24 as outpatient.     ROS:  As mentioned in the interim history otherwise negative by reviewing constitutional symptoms, central and peripheral neurological systems, respiratory system, cardiac system, GI system,  system, musculoskeletal, skin, allergy, and lymphatics.                 Physical Examination:  Temp: 97.7  F (36.5  C) Temp src: Axillary BP: (!) 79/34 Pulse: (!) 122   Resp: (!) 32 SpO2: 95 % O2 Device: Mechanical Ventilator    Vitals:    06/19/24 0400 06/20/24 0430 06/20/24 2200 06/21/24 1700   Weight: 52.8 kg (116 lb 6.4 oz) 55.6 kg (122 lb 9.2 oz) 56.2 kg (123 lb 14.4 oz) 57.6 kg (126 lb 15.8 oz)    06/22/24 0200   Weight: 57.6 kg (126 lb 15.8 oz)     Constitutional: Pleasant adult female seen laying in bed. Intubated. Awake, alert, very anxious today.  HEENT: NCAT,  anicteric sclerae, conjunctiva clear. Moist mucous membranes.  Respiratory: Non-labored breathing, on vent PEEP 8, 50%.  Lungs are coarse bilaterally without wheezes  Cardiovascular: Tachycardic with regular rhythm with no murmur, rub or gallop.  GI: Abdomen is soft, non-distended, and non-tender to palpation. No suprapubic tenderness.  Skin: Warm and dry. Dusky toes bilaterally.  Musculoskeletal: Extremities grossly normal. No tenderness.   Neurologic: nods yes/no appropriately, moves all extremities  VAD: Multiple central lines placed, all are c/d/i with no erythema, drainage, or tenderness.    Medications:  Medications that Require Transfusion:   Current Facility-Administered Medications   Medication Dose Route Frequency Provider Last Rate Last Admin    dextrose 10% infusion   Intravenous Continuous PRN Awa Watt MD        dialysate for CVVHD & CVVHDF (Phoxillum BK4/2.5)  12.5 mL/kg/hr CRRT Continuous Hardy Tran APRN  mL/hr at 06/22/24 0453 12.5 mL/kg/hr at 06/22/24 0453    heparin (porcine) 20,000 units in 20 mL ANTICOAGULANT infusion (syringe from pharmacy)  500 Units/hr CRRT Continuous Hardy Tran APRN CNS 0.5 mL/hr at 06/22/24 1000 500 Units/hr at 06/22/24 1000    norepinephrine (LEVOPHED) 16 mg in  mL infusion MAX CONC CENTRAL LINE  0.01-0.6 mcg/kg/min (Dosing Weight) Intravenous Continuous Kajal Chong APRN CNP 5.7 mL/hr at 06/22/24 1009 0.12 mcg/kg/min at 06/22/24 1009    POST-filter replacement solution for CVVHD & CVVHDF (Phoxillum BK4/2.5)   CRRT Continuous Hardy Tran APRN  mL/hr at 06/21/24 1414 New Bag at 06/21/24 1414    PRE-filter replacement solution for CVVHD & CVVHDF (Phoxillum BK4/2.5)  12.5 mL/kg/hr CRRT Continuous Hardy Tran APRN  mL/hr at 06/22/24 0453 12.5 mL/kg/hr at 06/22/24 0453    vasopressin 1 unit/mL MAX Conc (PITRESSIN) infusion  4 Units/hr Intravenous Continuous Chio Cortez MD 4 mL/hr at 06/22/24  1000 4 Units/hr at 06/22/24 1000     Scheduled Medications:   Current Facility-Administered Medications   Medication Dose Route Frequency Provider Last Rate Last Admin    acetylcysteine (MUCOMYST) 10 % nebulizer solution 4 mL  4 mL Inhalation BID Velez Reyes, German, MD        albuterol (PROVENTIL) neb solution 2.5 mg  2.5 mg Nebulization 4x Daily Kajal Chong APRN CNP   2.5 mg at 06/22/24 0851    azithromycin (ZITHROMAX) tablet 250 mg  250 mg Oral or Feeding Tube Daily Velez Reyes, German, MD   250 mg at 06/22/24 1008    B and C vitamin Complex with folic acid (NEPHRONEX) liquid 5 mL  5 mL Per Feeding Tube Daily Awa Watt MD   5 mL at 06/22/24 0806    calcium carbonate (TUMS) chewable tablet 500 mg  500 mg Oral Once per day on Sunday Tuesday Thursday Saturday Josesito Pratt MD   500 mg at 06/22/24 0813    chlorhexidine (PERIDEX) 0.12 % solution 15 mL  15 mL Mouth/Throat Q12H Chio Cortez MD   15 mL at 06/22/24 0806    DAPTOmycin (CUBICIN) 400 mg in sodium chloride 0.9 % 100 mL intermittent infusion  8 mg/kg (Dosing Weight) Intravenous Q24H Awa Watt MD   400 mg at 06/21/24 1459    fludrocortisone (FLORINEF) tablet 0.1 mg  0.1 mg Oral or Feeding Tube Daily Evelyn Roberts APRN CNP   0.1 mg at 06/22/24 0806    hydrocortisone sodium succinate PF (solu-CORTEF) injection 50 mg  50 mg Intravenous Q6H Evelyn Roberts APRN CNP   50 mg at 06/22/24 1008    insulin aspart (NovoLOG) injection (RAPID ACTING)  1-12 Units Subcutaneous Q4H Adilia Hunt MD   2 Units at 06/22/24 0806    levofloxacin (LEVAQUIN) infusion 500 mg  500 mg Intravenous Q24H Velez Reyes, German,  mL/hr at 06/21/24 1646 500 mg at 06/21/24 1646    [Held by provider] magnesium chloride CR tablet 1,070 mg  1,070 mg Oral Once per day on Sunday Tuesday Thursday Saturday Josesito Pratt MD        meropenem (MERREM) 1 g vial to attach to  mL bag  1 g Intravenous Q12H Velez Reyes, German, MD   1 g at 06/22/24 0367     [Held by provider] metoprolol tartrate (LOPRESSOR) tablet 25 mg  25 mg Oral BID Velez Reyes, German, MD        minocycline (MINOCIN) 100 mg in sodium chloride 0.9 % 100 mL intermittent infusion  100 mg Intravenous Q12H Kajal Chong APRN  mL/hr at 06/22/24 0023 100 mg at 06/22/24 0023    montelukast (SINGULAIR) tablet 10 mg  10 mg Oral At Bedtime Velez Reyes, German, MD   10 mg at 06/21/24 2228    pantoprazole (PROTONIX) 2 mg/mL suspension 40 mg  40 mg Per Feeding Tube Daily Josesito Pratt MD        posaconazole (NOXAFIL) DR tablet TBEC 300 mg  300 mg Per Feeding Tube Daily Josesito Pratt MD   300 mg at 06/21/24 1150    [Held by provider] predniSONE (DELTASONE) tablet 5 mg  5 mg Oral Daily Velez Reyes, German, MD   5 mg at 06/21/24 0822    protein modular (PROSOURCE TF20) packet 1 packet  1 packet Per Feeding Tube BID Awa Watt MD   1 packet at 06/22/24 0807    QUEtiapine (SEROquel) tablet 25 mg  25 mg Oral or Feeding Tube BID Velez Reyes, German, MD   25 mg at 06/22/24 1008    sodium chloride (NEBUSAL) 3 % neb solution 3 mL  3 mL Nebulization BID Velez Reyes, German, MD        sodium chloride (PF) 0.9% PF flush 10 mL  10 mL Intracatheter Q8H Kajal Chong APRN CNP        sodium chloride (PF) 0.9% PF flush 10 mL  10 mL Intracatheter Q8H Kajal Chong APRN CNP   10 mL at 06/22/24 0807    sulfamethoxazole-trimethoprim (BACTRIM) 400-80 MG per tablet 1 tablet  1 tablet Oral or Feeding Tube Daily Josesito Pratt MD   1 tablet at 06/21/24 2006    [Held by provider] tacrolimus (GENERIC) suspension 0.6 mg  0.6 mg Oral QAM Velez Reyes, German, MD   0.6 mg at 06/21/24 0822    And    [Held by provider] tacrolimus (GENERIC) suspension 0.7 mg  0.7 mg Oral QPM Velez Reyes, German, MD   0.7 mg at 06/20/24 1810    Vitamin D3 (CHOLECALCIFEROL) tablet 50 mcg  50 mcg Oral Once per day on Monday Wednesday Friday Velez Reyes, German, MD   50 mcg at 06/21/24 0834         Laboratory Data:   No results found for:  "\"ACD4\"    Inflammatory Markers    Recent Labs   Lab Test 06/18/24  1418 10/29/23  0642 10/28/23  0725 10/07/19  1221 10/06/19  1444 09/13/19  0934 09/11/19  2325 08/22/19  0820   SED 10 66* 58* 93*  --  111* 102*  --    CRP  --   --   --   --  25.0* 58.0* 66.0* 47.0*       Immune Globulin Studies     Recent Labs   Lab Test 06/19/24  0839 10/27/23  0701 10/24/23  1033 09/15/21  0915 01/31/21  0441 01/25/21  0406 10/27/19  0621 03/01/18  0356 02/19/18  0759    979  --  1,198 763  --  936 698 1,790*   IGM  --   --   --   --   --   --   --   --  502*   IGE  --   --  <2  --   --  <2  --   --  <2   IGA  --   --   --   --   --   --   --   --  425*   IGG1  --   --   --   --   --   --   --   --  1,300*   IGG2  --   --   --   --   --   --   --   --  131*   IGG3  --   --   --   --   --   --   --   --  101   IGG4  --   --   --   --   --   --   --   --  1*       Metabolic Studies       Recent Labs   Lab Test 06/22/24  0801 06/22/24  0441 06/22/24  0439 06/22/24  0041 06/22/24  0039 06/22/24  0035 06/21/24 2022 06/21/24 2021 06/21/24  1638 06/21/24  1626 06/21/24  1208 06/21/24  0356 06/20/24  1942 06/20/24  1506 06/20/24  0733 06/20/24  0420 06/18/24  1942 06/18/24  1803 06/27/22  1013 05/26/22  0750   NA  --   --  137  137  --  136  --   --   --   --  135  --  134*  134*  --  133*  --  133*   < > 137   < > 137   POTASSIUM  --   --  3.9  3.9  --  3.2*  --   --   --   --  3.5  --  3.6  3.6  --  3.8  --  3.7   < > 4.1   < > 3.5   CHLORIDE  --   --  106  106  --  106  --   --   --   --  104  --  103  103  --  101  --  99   < > 100   < > 96   CO2  --   --  21*  21*  --  21*  --   --   --   --  21*  --  20*  20*  --  20*  --  24   < > 27   < > 32   ANIONGAP  --   --  10  10  --  9  --   --   --   --  10  --  11  11  --  12  --  10   < > 10   < > 9   BUN  --   --  29.0*  29.0*  --  31.7*  --   --   --   --  33.0*  --  32.3*  32.3*  --  40.7*  --  34.5*   < > 17.8   < > 42*   CR  --   --  1.67*  1.67*  --  1.85*  " --   --   --   --  2.30*  --  2.72*  2.72*  --  4.04*  --  4.27*   < > 3.33*   < > 3.98*   GFRESTIMATED  --   --  34*  34*  --  30*  --   --   --   --  23*  --  19*  19*  --  12*  --  11*   < > 15*   < > 12*   * 166* 180*  180* 209* 217*  --   --  237*   < > 225*   < > 158*  155*  155*   < > 122*  123*   < > 131*   < > 100*   < > 110*   A1C  --   --   --   --   --   --   --   --   --   --   --   --   --   --   --   --   --   --   --  5.2   CHELSEY  --   --  8.0*  8.0*  --  7.9*  --   --   --   --  7.7*  --  7.9*  7.9*  --  7.5*  --  7.9*   < > 8.5*   < > 9.5   PHOS  --   --  2.5  --   --   --   --   --   --  3.0  --  2.6  --  2.5  --  1.9*  --  4.1   < >  --    MAG  --   --  2.3  --  2.2  --   --   --   --  2.2  --  2.2  --  1.6*  --  1.5*  --  2.0   < > 1.8   LACT  --   --   --   --   --  0.9 0.8  --   --   --   --   --    < >  --   --   --    < > 0.6*   < >  --    CKT  --   --   --   --   --   --   --   --   --   --   --  23*  --   --   --   --   --  107   < >  --     < > = values in this interval not displayed.       Hepatic Studies    Recent Labs   Lab Test 06/22/24  0439 06/22/24  0039 06/21/24  1626 06/21/24  0356 06/20/24  1506 06/20/24  0420 06/19/24  0613 06/18/24  1803 06/18/24  1418 11/21/23  0752 09/15/21  0915 05/01/21  0654 01/27/21  0414 01/26/21  0425   BILITOTAL 1.0  --   --  1.1  --  0.9 0.8 0.6 0.6 0.8   < >  --    < > 0.8   ALKPHOS 89  --   --  123  --  153* 175* 235* 237* 208*   < >  --    < > 184*   ALBUMIN 2.3*  2.3* 2.3* 2.2* 2.4*  2.4* 2.6* 2.7* 2.7* 3.6 3.6 3.8   < >  --    < > 2.0*   AST 11  --   --  12  --  26 31 39  --  27   < >  --    < > 11   ALT 17  --   --  20  --  23 28 39 39 23   < >  --    < > 30   LDH  --   --   --   --   --   --   --   --   --   --   --  164  --  187   GGT  --   --   --   --   --   --   --  147*  --   --   --   --   --   --     < > = values in this interval not displayed.       Pancreatitis testing    Recent Labs   Lab Test 06/18/24  1805  10/25/23  1356 05/26/22  0750 09/15/21  0915 01/24/21  0000 02/19/20  0713 12/31/19  1033 10/24/19  2032 10/21/19  1451 10/06/19  1444 09/11/19  2325 03/04/18  0353 02/19/18  0759   AMYLASE  --   --   --   --   --   --   --   --   --   --   --   --  58   LIPASE 40 24  --   --   --   --   --  212 119 172 127  --   --    TRIG  --   --  155* 68 242* 77 98  --   --   --   --  191* 115       Hematology Studies      Recent Labs   Lab Test 06/22/24  0439 06/22/24  0034 06/21/24  0356 06/20/24  0420 06/19/24  2359 06/19/24  0613 06/18/24  1418 03/05/24  0925 11/21/23  0752 11/04/23  0653   WBC 6.0 6.4 5.8 6.0 5.5 3.2*   < > 1.0*   < > 11.9*   ANEU 5.2  --  5.1 5.3 4.8  --   --  0.2*  --  10.7*   ALYM 0.0*  --  0.1* 0.1* 0.1*  --   --  0.6*  --  0.6*   SHERRI 0.7  --  0.5 0.5 0.4  --   --  0.2  --  0.2   AEOS 0.1  --  0.1 0.1 0.1  --   --  0.0  --  0.1   HGB 8.1* 8.4* 9.6* 10.4* 10.1* 9.6*   < > 10.5*   < > 8.5*   HCT 25.9* 27.2* 29.8* 32.2* 31.1* 30.9*   < > 33.3*   < > 26.8*   PLT 95* 84* 98* 109* 102* 106*   < > 96*   < > 256    < > = values in this interval not displayed.       Arterial Blood Gas Testing    Recent Labs   Lab Test 06/22/24  0747 06/22/24  0035 06/21/24  2220 06/21/24  2022 06/21/24  0817   PH 7.32* 7.33* 7.33* 7.27* 7.33*   PCO2 46* 43 43 50* 44   PO2 75* 71* 79* 68* 103   HCO3 24 23 23 23 23   O2PER 50 45 45 40 50        Urine Studies     Recent Labs   Lab Test 06/19/24  1214 01/24/21  1729 10/21/19  2240 09/12/19  0125 08/07/19  1512   URINEPH 6.5 5.0 5.0 7.5* 7.0   NITRITE Negative Negative Negative Negative Negative   LEUKEST Large* Moderate* Large* Negative Trace*   WBCU >182* 34* 115* 3 19*       Vancomycin Levels     Recent Labs   Lab Test 06/19/24  0613 10/26/23  0606 09/21/21  1911 01/28/21  0412 01/26/21  0425 01/25/21  1259   VANCOMYCIN 15.6 18.6 18.8 21.6 31.4* 15.1       Tobramycin levels     No lab results found.    Gentamicin levels    No lab results found.    Tacrolimus levels    Invalid  "input(s): \"TACROLIMUS\", \"TAC\", \"TACR\"      Latest Ref Rng & Units 6/22/2024     4:39 AM 6/22/2024    12:39 AM 6/22/2024    12:34 AM 6/21/2024     4:26 PM 6/21/2024     3:56 AM   Transplant Immunosuppression Labs   Creat 0.51 - 0.95 mg/dL  0.51 - 0.95 mg/dL 1.67     1.67  1.85   2.30  2.72     2.72    Urea Nitrogen 8.0 - 23.0 mg/dL  8.0 - 23.0 mg/dL 29.0     29.0  31.7   33.0  32.3     32.3    WBC 4.0 - 11.0 10e3/uL 6.0   6.4   5.8    Neutrophil % 86     88    ANEU 1.6 - 8.3 10e3/uL 5.2     5.1        Cyclosporine levels    Invalid input(s): \"CYCLOSPORINE\", \"CYC\"    Mycophenolate levels    Invalid input(s): \"MYPA\", \"MYP\"    Sirolimus levels    Invalid input(s): \"SIROLIMUS\", \"SIR\", \"RAPA\"    CSF testing   No lab results found.      Microbiology:  6/19  CrAg - negative  Urine Strep and Legionella antigens - negative  Urine culture - GNB  BAL - negative KOH, pending aerobic + fungal + AFB cultures    6/18  Negative RVP and COVID/flu/RSV  Histoplasma antigen (blood) - pending  Aspergillus galactomannan - negative  Fungitell - negative  Blood cultures x2 - NGTD    Sputum culture - Stenotrophomonas maltophilia      Fungal testing  Recent Labs   Lab Test 06/19/24  1513 06/18/24  1803 10/07/19  1544 09/14/19  1625   FGTL  --  <31   < > 122   FGTLI  --  Negative   < > Positive*   PJRDFA Not Detected  --    < >  --    ASPGAI 1.87 0.03   < > 1.08   ASPAG Positive*  --    < >  --    ASPGAA  --  Negative   < > Positive*   CIABB  --   --   --  <1:2   COFUNG  --  <1:2  --   --     < > = values in this interval not displayed.       Last Culture results   S Pneumoniae Antigen   Date Value Ref Range Status   01/24/2021   Final    Negative, no Streptococcus pneumoniae antigen detected by immunochromatographic membrane   assay. A negative Streptococcus pneumoniae antigen result does not rule out infection with   Streptococcus pneumoniae.       Streptococcus pneumoniae antigen   Date Value Ref Range Status   06/19/2024 Negative Negative " Final     Comment:     A negative Streptococcus pneumoniae antigen result does not rule out infection with Streptococcus pneumoniae.     P. jirovecii By PCR   Date Value Ref Range Status   06/19/2024 Not Detected  Final     Comment:       NOT DETECTED - A negative result does not rule out the   presence of PCR inhibitors in the patient specimen or assay   specific nucleic acid in concentrations below the level of   detection by the assay.    This test was developed and its performance characteristics   determined by [facility]. It has not been cleared or   approved by the [place] Food and Drug Administration. This test   was performed in a CLIA certified laboratory and is   intended for clinical purposes.  Performed By: [facility]  [address] [place] [address]  : Rogelio Llanos MD, PhD  CLIA Number: [id number]   10/17/2023 Not Detected  Final     Comment:     NOT DETECTED - A negative result does not rule out the   presence of PCR inhibitors in the patient specimen or   assay specific nucleic acid in concentrations below the   level of detection by the assay.    This test was developed and its performance characteristics   determined by [facility]. It has not been cleared or   approved by the US Food and Drug Administration. This test   was performed in a CLIA certified laboratory and is   intended for clinical purposes.  Performed By: [facility]  [address] [place] [address]  : Rogelio Llanos MD, PhD  CLIA Number: [id number]   08/17/2023 Not Detected  Final     Comment:     NOT DETECTED - A negative result does not rule out the   presence of PCR inhibitors in the patient specimen or   assay specific nucleic acid in concentrations below the   level of detection by the assay.    This test was developed and its performance characteristics   determined by [facility]. It has not been cleared or   approved by the [place]  Food and Drug Administration. This test   was performed in a CLIA certified laboratory and is   intended for clinical purposes.  Performed By: [facility]  [address] [place] [id number]  : Rogelio Llanos MD, PhD  CLIA Number: [id number]   08/17/2023 Not Detected  Final     Comment:     NOT DETECTED - A negative result does not rule out the   presence of PCR inhibitors in the patient specimen or   assay specific nucleic acid in concentrations below the   level of detection by the assay.    This test was developed and its performance characteristics   determined by [facility]. It has not been cleared or   approved by the US Food and Drug Administration. This test   was performed in a CLIA certified laboratory and is   intended for clinical purposes.  Performed By: [facility]  [address] [place] [id number]  : Rogelio Llanos MD, PhD  CLIA Number: [id number]   01/24/2021 Detected (A)  Final     Comment:     (Note)  DETECTED - P. jirovecii DNA detected in this specimen.  Results should be used to aid in the diagnosis of PCP  pneumonia and must be interpreted in the context of host  risk factors, clinical presentation, and radiographic  imaging.  TEST INFORMATION: Pneumocystis jirovecii Detection by PCR  Test developed and characteristics determined by [facility]. See Compliance Statement B: [facility]/  Performed by [facility],  [address] [place] [id number] [phone]  [email or web address], Carri Red MD, Lab. Director       Culture   Date Value Ref Range Status   06/21/2024 No growth after 12 hours  Preliminary   06/21/2024 No growth after 12 hours  Preliminary   06/19/2024 Culture in progress  Preliminary   06/19/2024 1+ Gram negative bacilli (A)  Preliminary   06/19/2024 No growth after 2 days  Preliminary   06/19/2024 Culture in progress  Preliminary   06/19/2024 >100,000 CFU/mL Gram negative bacilli (A)   Preliminary   06/19/2024 >100,000 CFU/mL Enterococcus faecium VRE (A)  Preliminary   06/18/2024 3+ Normal alo  Preliminary   06/18/2024 2+ Stenotrophomonas maltophilia (A)  Preliminary   06/18/2024 2+ Stenotrophomonas maltophilia (A)  Preliminary   06/18/2024 No growth after 3 days  Preliminary   06/18/2024 No growth after 3 days  Preliminary   02/15/2024 No Actinomyces like species isolated  Final   02/15/2024 No Growth  Final   02/15/2024 No Growth  Final   02/15/2024 1+ Normal alo  Final   11/01/2023 No Growth  Final   11/01/2023 Aspergillus ochraceus (A)  Final   11/01/2023 1+ Normal alo  Final   11/01/2023 1+ Stenotrophomonas maltophilia (A)  Final     GS Culture   Date Value Ref Range Status   06/21/2024 See corresponding culture for results  Final   06/19/2024 See corresponding culture for results  Final     Culture Micro   Date Value Ref Range Status   05/01/2021   Final    Quantity not sufficient  Called to Maxim Norman at 1236 5/1/21.    mlb     05/01/2021 Culture negative after 30 days  Final   04/29/2021 No anaerobes isolated  Final   04/29/2021 No growth  Final   02/19/2021 Culture negative for acid fast bacilli  Final   02/19/2021   Final    Assayed at [facility] [address] [place] [id number] [phone]   02/19/2021 Culture negative after 4 weeks  Final   02/19/2021 No growth after 4 weeks  Final   02/19/2021 Moderate growth  Staphylococcus epidermidis   (A)  Final   02/09/2021 No growth  Final         Last check of C difficile  C Diff Toxin B PCR   Date Value Ref Range Status   02/13/2021 Negative NEG^Negative Final     Comment:     Negative: C. difficile target DNA sequences NOT detected, presumed negative   for C.difficile toxin B or the number of bacteria present may be below the   limit of detection for the test.  FDA approved assay performed using IP [employer] GeneXpert real-time PCR.  A negative result does not exclude actual disease due to C. difficile and may   be due to  improper collection, handling and storage of the specimen or the   number of organisms in the specimen is below the detection limit of the assay.       C Difficile Toxin B by PCR   Date Value Ref Range Status   06/22/2024 Negative Negative Final     Comment:     A negative result does not exclude actual disease due to C. difficile and may be due to improper collection, handling and storage of the specimen or the number of organisms in the specimen is below the detection limit of the assay.       Virology:  Coronavirus-19 testing    Recent Labs   Lab Test 06/18/24  1729 06/18/24  1433 06/17/24  1337 07/11/23  1132 10/12/22  1528 09/26/22  0956 05/23/22  1013 04/01/22  1257 02/07/22  1301 01/31/22  1309 01/24/22  1324 09/20/21  1859 06/07/21  1310 05/02/21  0715 04/26/21  1151 04/08/21  0723   [id number]  --   --   --   --   --   --   --   --   --   --   --   --   --  Negative  --   --    [id number]  --   --   --   --   --   --   --   --   --   --   --   --   --  Not Applicable  --   --    [id number] Negative Negative  --  Negative  --   --   --   --   --   --   --  Negative   < >  --   --  Test received-See reflex to IDDL test SARS CoV2 (COVID-19) Virus RT-PCR  NEGATIVE   [id number]  --   --   --   --   --   --   --   --   --   --   --   --   --   --   --  Nasopharyngeal   [id number]  --   --   --   --   --   --   --   --   --   --   --   --   --   --   --  Nasopharyngeal   COVIDPCREXT  --   --  Negative  --  Negative Negative Undetected   < > Undetected Undetected   < >  --    < >  --   --   --    SOUREXT  --   --   --   --   --   --  Nasopharynx  --  Nasopharynx Nasopharynx   < >  --    < >  --    < >  --    [id number]  --   --   --   --   --   --  Undetected  --  Undetected Undetected   < >  --    < >  --    < >  --     < > = values in this interval not displayed.       Respiratory virus (non-coronavirus-19) testing    Recent Labs   Lab Test 06/18/24  1729 06/18/24  1433 11/01/23  0920 10/27/23  6191  01/24/21  1822 01/24/21  1629 12/23/20  1102 02/27/18  1016 02/22/18  0900   RVSPEC  --   --   --   --   --  Bronchial Bronchial   < >  --    AFLU  --   --   --   --   --   --   --   --  Duplicate request*   IFLUA Not Detected  --  Not Detected Not Detected   < > Negative Negative   < >  --    INFZA Negative   < >  --   --   --   --   --   --  Negative   FLUAH1 Not Detected  --  Not Detected Not Detected   < > Negative Negative   < >  --    OV1097 Not Detected  --  Not Detected Not Detected   < > Negative Negative   < >  --    FLUAH3 Not Detected  --  Not Detected Not Detected   < > Negative Negative   < >  --    BFLU  --   --   --   --   --   --   --   --  Duplicate request*   IFLUB Not Detected  --  Not Detected Not Detected   < > Negative Negative   < >  --    INFZB Negative   < >  --   --   --   --   --   --  Negative   PIV1 Not Detected  --  Not Detected Not Detected   < > Negative Negative   < >  --    PIV2 Not Detected  --  Not Detected Not Detected   < > Negative Negative   < >  --    PIV3 Not Detected  --  Not Detected Not Detected   < > Negative Negative   < >  --    PIV4 Not Detected  --  Not Detected Not Detected   < >  --   --    < >  --    IRSV Negative   < >  --   --   --   --   --   --  Negative   HRVS  --   --   --   --   --  Negative Negative   < >  --    RSVA Not Detected  --  Not Detected Not Detected   < > Negative Negative   < >  --    RSVB Not Detected  --  Not Detected Not Detected   < > Negative Negative   < >  --    HMPV Not Detected  --  Not Detected Not Detected   < > Negative Negative   < >  --    ADVBE  --   --   --   --   --  Negative Negative   < >  --    ADVC  --   --   --   --   --  Negative Negative   < >  --    ADENOV Not Detected  --  Not Detected Not Detected   < >  --   --    < >  --    CORONA Not Detected  --  Not Detected Not Detected   < >  --   --    < >  --     < > = values in this interval not displayed.       CMV viral loads    Recent Labs   Lab Test 06/18/24  7256  "06/12/24  0828 03/13/24  0853 03/05/24  0925 02/21/24  0838 02/14/24  1050 12/27/23  0834 12/19/23  1138 11/29/23  0826 11/21/23  0752 11/08/23  0846 11/01/23  0920 10/31/23  0606 10/24/23  1033 10/17/23  1011 10/04/23  0810 09/07/23  0710 08/17/23  1144 08/17/23  1137 08/08/23  0828 08/08/23  0828 07/11/23  0952 05/26/23  0828 04/06/23  0725 03/08/23  0848 02/09/23  1034 11/18/22  0928 09/27/22  1012 03/15/21  0904 02/25/21  0542   CMVQNT <35*  --   --  Not Detected  --   --   --  Not Detected  --  Not Detected  --   --  <35*  --   --   --   --   --   --   --   --  Not Detected  --  Not Detected  --  <137*  --  Not Detected   < > CMV DNA Not Detected   CMVRESINST  --   --   --   --   --  350*  --   --   --   --   --   --   --  103*  80* 75*  --  521*  --   --   --  46*  --   --   --   --   --   --   --   --   --    CSPEC  --   --   --   --   --   --   --   --   --   --   --   --   --   --   --   --   --   --   --   --   --   --   --   --   --   --   --   --   --  EDTA PLASMA   CMVLOG <1.5  --   --   --   --  2.5  --   --   --   --   --   --  <1.5 2.0  1.9 1.9  --  2.7  --   --    < > 1.7  --   --   --   --  <2.1  --   --    < > Not Calculated   90409  --  46*   < >  --    < >  --    < >  --    < >  --    < >  --   --   --   --    < >  --   --   --   --   --   --    < >  --    < >  --    < >  --    < >  --    CMVQAL  --   --   --   --   --   --   --   --   --   --   --  Not Detected  --   --   --   --   --  Not Detected Not Detected  --   --   --   --   --   --   --   --   --   --   --     < > = values in this interval not displayed.       No results found for: \"H6RES\"    EBV DNA Copies/mL   Date Value Ref Range Status   05/01/2021 38,186 (A) EBVNEG^EBV DNA Not Detected [Copies]/mL Final   02/22/2021 75,709 (A) EBVNEG^EBV DNA Not Detected [Copies]/mL Final   01/25/2021 5,619 (A) EBVNEG^EBV DNA Not Detected [Copies]/mL Final   12/09/2020 10,686 (A) EBVNEG^EBV DNA Not Detected [Copies]/mL Final   09/09/2020 2,935 " (A) EBVNEG^EBV DNA Not Detected [Copies]/mL Final   03/09/2020 8,918 (A) EBVNEG^EBV DNA Not Detected [Copies]/mL Final   02/19/2020 38,419 (A) EBVNEG^EBV DNA Not Detected [Copies]/mL Final   12/18/2019 11,933 (A) EBVNEG^EBV DNA Not Detected [Copies]/mL Final   11/11/2019 9,669 (A) EBVNEG^EBV DNA Not Detected [Copies]/mL Final   10/24/2019 13,391 (A) EBVNEG^EBV DNA Not Detected [Copies]/mL Final   08/01/2018 EBV DNA Not Detected EBVNEG^EBV DNA Not Detected [Copies]/mL Final       BK viral loads   Recent Labs   Lab Test 08/07/19  0959   BKSPEC Plasma   BKRES BK Virus DNA Not Detected         Imaging:  XR Chest Port 1 View  Result Date: 6/20/2024  Impression: Bilateral pulmonary opacities, mildly increased in the upper lobes suggesting increased edema and underlying infection. The endotracheal tube projects 2.3 cm above the gee.    XR Chest Port 1 View  Result Date: 6/19/2024  IMPRESSION: ET tube tip projects 1.6 cm above the gee. Small bilateral pleural effusions with bibasilar, right greater than left airspace opacities suspicious for infection versus atelectasis.     CT Chest Hi-Resolution wo Contrast  Result Date: 6/18/2024  IMPRESSION: Bilateral transplanted lungs. Slight decreased confluent consolidative opacities in right lower lobe with more micronodular component of possible infection in the right lower lobe. Other abnormalities throughout the lungs similar to February of the transplanted lungs. Pleural effusions again noted. Narrowing of the bronchus intermedius on the right there appears to be occlusion of the right middle lobe bronchus with narrowing of the distal aspect of the right lower lobe bronchus with short segment occlusions of the medial and posterior basilar bronchial segments to the right lower lobe. No such abnormality on the left.. No ectopic air. Anastomoses appear grossly intact bilaterally with just under approximate 50% narrowing distal to the right mainstem anastomosis proximal to  the upper lobe bronchus takeoff. This right middle lobe bronchial occlusion is new from February. Mitral annular calcifications with left atrial prominence, please correlate for arrhythmia. Borderline pulmonary enlargement concerning for possible pulmonary hypertension. Borderline mid ascending aortic ectasia.       ECHO:  Echo Complete  Result Date: 6/19/2024  Interpretation Summary Global and regional left ventricular function is normal with an EF of 55-60%. Right ventricular function, chamber size, wall motion, and thickness are normal. The inferior vena cava cannot be assessed. No pericardial effusion is present. No significant valvular abnormalities present.     Lissett Lewis MD  [facility]  Contact information available via [facility] Paging/Directory

## 2024-06-22 NOTE — PLAN OF CARE
Bilateral unsecured mitt restraints initiated on patient on 6/22/2024 at 04:00 AM    Clinical Justification: Pulling lines, pulling tubes, and pulling equipment  Less Restrictive Alternative: 1:1 patient care, Repositioning, Pain management, Disguise equipment, Alarm, Reorientation   ,     Order received: Yes     Family Notification: Other (no family present currently)   Criteria explained to Patient  Patient's Response: Needs reinforcement  Restraint care Plan initiated: Yes    Dianne Ely RN       Problem: Restraint, Nonviolent  Goal: Absence of Harm or Injury  Outcome: Progressing  Intervention: Protect Dignity, Rights and Personal Wellbeing  Recent Flowsheet Documentation  Taken 6/22/2024 0400 by Dianne Ely RN  Trust Relationship/Rapport: care explained  Taken 6/22/2024 0000 by Dianne Ely RN  Trust Relationship/Rapport: care explained  Taken 6/21/2024 2000 by Dianne Ely RN  Trust Relationship/Rapport:   care explained   reassurance provided   thoughts/feelings acknowledged  Intervention: Protect Skin and Joint Integrity  Recent Flowsheet Documentation  Taken 6/22/2024 0400 by Dianne Ely RN  Body Position:   turned   lower extremity elevated   upper extremity elevated  Skin Protection:   incontinence pads utilized   transparent dressing maintained  Taken 6/22/2024 0200 by Dianne Ely RN  Body Position: turned  Taken 6/22/2024 0000 by Dianne Ely RN  Body Position:   turned   lower extremity elevated   upper extremity elevated  Skin Protection:   incontinence pads utilized   transparent dressing maintained  Taken 6/21/2024 2200 by Dianne Ely RN  Body Position: turned  Taken 6/21/2024 2000 by Dianne Ely RN  Body Position:   turned   right   lower extremity elevated   upper extremity elevated  Skin Protection:   incontinence pads utilized   transparent dressing maintained

## 2024-06-22 NOTE — PLAN OF CARE
ICU End of Shift Summary. See flowsheets for vital signs and detailed assessment.    Changes this shift: Confused, delirium, Zyprexa given x1, Seroquel given, Fentanyl restarted at 75/hr. CRRT, per team goal, I=O. CMV: 50%, peep 8. PIP 30-32. TF at goal, unchanged. Loose stool x3, fiber started. Anuric. MAP goal >65, Levo at 0.05, Vaso at 2.4. Assist x 1-2 with lift. Encourage sleep, pt hasn't slept well in last 24hrs+.     Plan: Encourage sleep. Reorientation. MAP goal >65. CRRT, goal I=O. Continue with POC.     Goal Outcome Evaluation:      Plan of Care Reviewed With: patient    Overall Patient Progress: no changeOverall Patient Progress: no change    Outcome Evaluation: Sinus Tachy, -140's. RR 30-40's. CRRT, attempt to pull fluids. No new vent changes.

## 2024-06-22 NOTE — PROGRESS NOTES
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  The patient was admitted on 24 for progressive hypoxia with dyspnea and worsening hypercapnia in ED requiring intubation likely d/t post-obstructive PNA 2/2 right bronchus stenosis.  Bronch confirming severe stenosis of right anastomosis with extensive RLL plugging.  IP bronch () with laser tissue debulking RMB stenosis, airway balloon dilation of RMB and BI, and placement of stent RMB to BI and bifurcating stent in RUL.  Remains intubated.  Transitioned to CRRT .       Today's recommendations:  - Continue to hold tacrolimus today, level ordered for tomorrow   - Following pending cultures and ID work up  - ABX per transplant ID  - Defer consideration of airway clearance to IP and MICU team  - Repeat bronch in 6 weeks per IP, sooner if indicated  - Repeat Prospera and ImmuKnow () pending  - Resume chronic azithromycin  - Tacrolimus level pending, will adjust dose and repeat level as indicated  - Chronic prednisone held while on stress dose steroids  - Continue PTA posaconazole  - CMV weekly monitoring (ordered ) with stress dose steroids, revisit need for VGCV ppx if prolonged duration of steroids (>3 days) per Dr. Erika Ferguson on chronic  hypoxic/hypercapneic respiratory failure:  Post-obstructive multi-lobular PNA:  Right bronchial stenosis with RML collapse: Admitted with 2 weeks of progressive hypoxia, dyspnea, congested cough, and fatigue.  Chronic hypoxia with 2L NC, increased from 3 to 4L over this time with acute decompensation on day of admission associated with hypercapnia.  VBG 7/17/85 in ED s/p intubation.  Most recent IP bronch 2/15 s/p tissue debulking without stent placement.  Respiratory panel, COVID, and MRSA nares negative.  Additional negative ID workup: Legionella, Strep pneumoniae, Histo, CrAg, fungitell, and A. galactomannan (blood).  IgG WNL.  Procal mildly elevated at 0.24 initially (then elevated to 1.77 6/20), LA normal, but febrile to 100.7.  TTE on admission grossly normal.  CT with RUL/RLL consolidative opacities, RML collapse, and narrowing of BI and distal RLL bronchus.  Started on norepi 6/19.  Bronch per MICU (6/19) with severe stenosis starting at right anastomosis, inspection with smaller scope revealing for extensive RLL mucous plugging.  Bronch per IP (6/20) with laser tissue debulking RMB stenosis, airway balloon dilation of RMB and BI, and placement of stent RMB to BI and bifurcating stent in RUL.  Low grade fevers persisting, remains on full vent support.    - Sputum culture (6/18) with Stenotrophomonas x2, follow  - RLL bronch wash (6/19) with E faecalis and steno  - Blood cultures (6/18) NGTD  - Additional ID workup pending: PJP, Cocci, and A. galactomannan (BAL)  - ABX: Zosyn (6/18), minocycline (6/20, Steno) per transplant ID; s/p IV azithromycin 500 mg daily (6/18-6/20) and IV vancomycin (6/18)   - Nebs: albuterol QID, Mucomyst 10% BID alternating with 3% HTS as of 6/22,  Volera stopped 6/20 with stent placement, defer consideration of airway clearance to IP and MICU team  - Repeat bronch in 6 weeks per IP, sooner if indicated  - Vent management per MICU  - Stress dose steroids per MICU     S/p bilateral  sequential lung transplant (BSLT) for ILD:   Progressive CLAD: Most recent OP visit in February.  DSA negative 6/19 (last positive noted 2018).  - Repeat Prospera (6/19) pending, may be artificially elevated with current infection, but notably high at 2.42 on 2/14  - Repeat ImmuKnow (6/19) pending, prior low normal at 227 on 2/14)  - PTA Singulair, azithromycin (resume chronic dose), and Breo inhaler as able (resume once extubated)     Immunosuppression:  On 2-drug IST since November d/t leukopenia and recurrent infections  - Tacrolimus 0.6 mg qAM / 0.7 mg qPM.  Goal level 8-10.  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  - ABX and infectious work up as above     Aspergillus ochraceus:  H/o Aspergillus empyema:  S/p empyema drainage and ampho B instillation.  Previously on voriconazole, transitioned to posaconazole and managed by transplant ID as OP with last visit 3/13.  Calcified fungal elements remain with additional recurrent effusion (likely associated with fluid disturbances r/t iHD), but surgical intervention previously deferred d/t risk.  Posaconazole level 2.5 on 6/19.  - Posaconazole 300 mg daily     H/o CMV viremia: Recurrent CMV viremia historically.  VGCV ppx most recently stopped 4/12.  Most recent CMV low positive at 46 (6/12) and <35 (6/18).  - CMV weekly monitoring (ordered 6/25) with stress dose steroids, revisit need for VGCV ppx if prolonged duration of steroids (>3 days)      EBV viremia: S/p rituximab 12/13/23 x1 dose.  Most recent EBV negative 6/18.     ESRD on iHD: Kidney evaluation started as OP.  On qMWF iHD schedule, no missed sessions.  Transitioned to  "CRRT on 6/20, management per renal and MICU.    Positive urine culture: Noted 6/19, >100k GNB and VRE faecium, ABX as above with management per MICU team.  - Daptomycin per transplant ID    We appreciate the excellent care provided by the MICU team.  Recommendations communicated via in person rounding and this note.  Will continue to follow along closely, please do not hesitate to call with any questions or concerns.    Yenni Graham MD  Pulmonary Transplant/CF Attending  Pager: [phone]  Vocera Web Console          Subjective & Interval History:   Remains on full vent support, Pressor needs decreasing throughout today and have been able to remove some fluid on CRRT, she is awake and alert but quite delirious. She was worried someone was \"following me.\" She was not fully able to really have a good ROS conversation.     Review of Systems:     ROS limited due to intubation    C: + increased weight  INTEGUMENTARY/SKIN: No rash or obvious new lesions  ENT/MOUTH: No nasal drainage  RESP: See interval history  CV: + peripheral edema  GI: No nausea, no vomiting, no change in stools  : + anuric  MUSCULOSKELETAL: No myalgias, no arthralgias  ENDOCRINE: Blood sugars with adequate control  NEURO: No headache  PSYCHIATRIC: Mood stable    Physical Exam:     All notes, images, and labs from past 24 hours (at minimum) were reviewed.    Vital signs:  Temp: 97.7  F (36.5  C) Temp src: Axillary BP: (!) 79/34 Pulse: 100   Resp: 28 SpO2: 98 % O2 Device: Mechanical Ventilator Oxygen Delivery: 6 LPM Height: 160 cm (5' 3\") Weight: 57.6 kg (126 lb 15.8 oz)  I/O:     Intake/Output Summary (Last 24 hours) at 6/22/2024 1406  Last data filed at 6/22/2024 1300  Gross per 24 hour   Intake 2919.46 ml   Output 216 ml   Net 2703.46 ml         Constitutional: Lying in bed, in no apparent distress.   HEENT: Eyes with pink conjunctivae, anicteric.  Orally intubated.  Nasal FT.  PULM: Mildly diminished air flow to bases bilaterally.  No rhonchi, no " wheezes.  Non-labored breathing on full vent support.  CV: Normal S1 and S2.  RRR.  No murmur, gallop, or rub.  + BLE edema.  ABD: NABS, soft, nontender, nondistended.    MSK: No apparent muscle wasting.   NEURO: Opens eyes to voice, awake, alert, delirious   SKIN: Warm (except bilateral toes cool/dusky), dry.    PSYCH: agitated    Data:     LABS    CMP:   Recent Labs   Lab 06/22/24  0801 06/22/24  0441 06/22/24  0439 06/22/24  0041 06/22/24  0039 06/21/24  1638 06/21/24  1626 06/21/24  1208 06/21/24  0356 06/20/24  1942 06/20/24  1506 06/20/24  0733 06/20/24  0420 06/19/24  0841 06/19/24  0613   NA  --   --  137  137  --  136  --  135  --  134*  134*  --  133*  --  133*   < > 134*   POTASSIUM  --   --  3.9  3.9  --  3.2*  --  3.5  --  3.6  3.6  --  3.8  --  3.7   < > 3.5   CHLORIDE  --   --  106  106  --  106  --  104  --  103  103  --  101  --  99   < > 98   CO2  --   --  21*  21*  --  21*  --  21*  --  20*  20*  --  20*  --  24   < > 22   ANIONGAP  --   --  10  10  --  9  --  10  --  11  11  --  12  --  10   < > 14   * 166* 180*  180* 209* 217*   < > 225*   < > 158*  155*  155*   < > 122*  123*   < > 131*   < > 121*   BUN  --   --  29.0*  29.0*  --  31.7*  --  33.0*  --  32.3*  32.3*  --  40.7*  --  34.5*   < > 21.8   CR  --   --  1.67*  1.67*  --  1.85*  --  2.30*  --  2.72*  2.72*  --  4.04*  --  4.27*   < > 3.74*   GFRESTIMATED  --   --  34*  34*  --  30*  --  23*  --  19*  19*  --  12*  --  11*   < > 13*   CHELSEY  --   --  8.0*  8.0*  --  7.9*  --  7.7*  --  7.9*  7.9*  --  7.5*  --  7.9*   < > 8.0*   MAG  --   --  2.3  --  2.2  --  2.2  --  2.2  --  1.6*  --  1.5*  --   --    PHOS  --   --  2.5  --   --   --  3.0  --  2.6  --  2.5  --  1.9*  --   --    PROTTOTAL  --   --  4.6*  --   --   --   --   --  4.7*  --   --   --  5.0*  --  4.7*   ALBUMIN  --   --  2.3*  2.3*  --  2.3*  --  2.2*  --  2.4*  2.4*  --  2.6*  --  2.7*  --  2.7*   BILITOTAL  --   --  1.0  --   --   --   --   --   "1.1  --   --   --  0.9  --  0.8   ALKPHOS  --   --  89  --   --   --   --   --  123  --   --   --  153*  --  175*   AST  --   --  11  --   --   --   --   --  12  --   --   --  26  --  31   ALT  --   --  17  --   --   --   --   --  20  --   --   --  23  --  28    < > = values in this interval not displayed.     CBC:   Recent Labs   Lab 06/22/24  0439 06/22/24  0034 06/21/24  0356 06/20/24  0420   WBC 6.0 6.4 5.8 6.0   RBC 2.62* 2.76* 3.04* 3.32*   HGB 8.1* 8.4* 9.6* 10.4*   HCT 25.9* 27.2* 29.8* 32.2*   MCV 99 99 98 97   MCH 30.9 30.4 31.6 31.3   MCHC 31.3* 30.9* 32.2 32.3   RDW 15.0 15.2* 15.0 14.8   PLT 95* 84* 98* 109*       INR:   Recent Labs   Lab 06/18/24  1418   INR 0.98       Glucose:   Recent Labs   Lab 06/22/24  0801 06/22/24  0441 06/22/24  0439 06/22/24  0041 06/22/24  0039 06/21/24  2021   * 166* 180*  180* 209* 217* 237*       Blood Gas:   Recent Labs   Lab 06/22/24  0747 06/22/24  0035 06/21/24  2220 06/21/24  0817 06/19/24  2359 06/19/24  0614 06/18/24  2348   PHV  --   --   --   --  7.40 7.45* 7.41   PCO2V  --   --   --   --  43 37* 43   PO2V  --   --   --   --  46 43 43   HCO3V  --   --   --   --  26 26 27   LASHAUN  --   --   --   --  1.3 1.9 1.9   O2PER 50 45 45   < > 30 45 40    < > = values in this interval not displayed.       Culture Data No results for input(s): \"CULT\" in the last 168 hours.    Virology Data:   Lab Results   Component Value Date    FLUAH1 Not Detected 06/18/2024    FLUAH3 Not Detected 06/18/2024    HC2795 Not Detected 06/18/2024    IFLUB Not Detected 06/18/2024    RSVA Not Detected 06/18/2024    RSVB Not Detected 06/18/2024    PIV1 Not Detected 06/18/2024    PIV2 Not Detected 06/18/2024    PIV3 Not Detected 06/18/2024    HMPV Not Detected 06/18/2024    HRVS Negative 01/24/2021    ADVBE Negative 01/24/2021    ADVC Negative 01/24/2021    ADVC Negative 12/23/2020    ADVC Negative 10/07/2019       Historical CMV results (last 3 of prior testing):  Lab Results   Component " Value Date    CMVQNT <35 (A) 2024    CMVQNT Not Detected 2024    CMVQNT Not Detected 2023     Lab Results   Component Value Date    CMVLOG <1.5 2024    CMVLOG 2.5 2024    CMVLOG <1.5 10/31/2023       Urine Studies    Recent Labs   Lab Test 24  1214 21  1729   URINEPH 6.5 5.0   NITRITE Negative Negative   LEUKEST Large* Moderate*   WBCU >182* 34*       Most Recent [place] Pulmonary Function Testing (FVC/FEV1 only)  FVC-Pre   Date Value Ref Range Status   2024 0.85 L    2023 1.04 L    2023 0.85 L    10/24/2023 0.96 L      FVC-%Pred-Pre   Date Value Ref Range Status   2024 29 %    2023 36 %    2023 29 %    10/24/2023 33 %      FEV1-Pre   Date Value Ref Range Status   2024 0.80 L    2023 0.89 L    2023 0.78 L    10/24/2023 0.87 L      FEV1-%Pred-Pre   Date Value Ref Range Status   2024 34 %    2023 38 %    2023 33 %    10/24/2023 37 %        IMAGING    Recent Results (from the past 48 hour(s))   Echo Complete   Result Value    LVEF  55-60%    Narrative    [id number]  [id number]  [id number]  [id number]^VELEZ REYES^JAMEL     [facility],[place]  Echocardiography Laboratory  [address] [place] [id number]     Name: SARAI KILLIAN  MRN: [id number]  : 1960  Study Date: 2024 08:11 AM  Age: 63 yrs  Gender: Female  Patient [place]: [facility] [place]  Reason For Study: Cardiomyopathy  Ordering Physician: VELEZ REYES, GERMAN  Performed By: Patricia Mandujano RDCS     BSA: 1.5 m2  Height: 63 in  Weight: 112 lb  HR: 80  BP: 94/45 mmHg  ______________________________________________________________________________  Procedure  Complete Portable Echo Adult.  ______________________________________________________________________________  Interpretation Summary  Global and regional left ventricular function is normal with an EF of 55-60%.  Right ventricular function, chamber size, wall motion,  and thickness are  normal.  The inferior vena cava cannot be assessed.  No pericardial effusion is present.  No significant valvular abnormalities present.  ______________________________________________________________________________  Left Ventricle  Global and regional left ventricular function is normal with an EF of 55-60%.  Left ventricular wall thickness is normal. Left ventricular size is normal.  Left ventricular diastolic function is normal. No regional wall motion  abnormalities are seen.     Right Ventricle  Right ventricular function, chamber size, wall motion, and thickness are  normal.     Atria  Both atria appear normal.     Mitral Valve  The mitral valve is normal. Trace mitral insufficiency is present.     Aortic Valve  The valve leaflets are not well visualized. On Doppler interrogation, there is  no significant stenosis or regurgitation.     Tricuspid Valve  Mild tricuspid insufficiency is present. The right ventricular systolic  pressure is approximated at 35.5 mmHg plus the right atrial pressure.     Pulmonic Valve  The pulmonic valve is normal.     Vessels  The thoracic aorta is normal. The pulmonary artery is normal. The inferior  vena cava cannot be assessed.     Pericardium  No pericardial effusion is present.     Miscellaneous  No significant valvular abnormalities present.  ______________________________________________________________________________  MMode/2D Measurements & Calculations     IVSd: 1.2 cm  LVIDd: 4.0 cm  LVIDs: 2.8 cm  LVPWd: 1.1 cm  FS: 29.9 %  LV mass(C)d: 161.0 grams  LV mass(C)dI: 106.5 grams/m2  Ao root diam: 3.2 cm  asc Aorta Diam: 3.0 cm  LVOT diam: 2.0 cm  LVOT area: 3.3 cm2  Ao root diam index Ht(cm/m): 2.0  Ao root diam index BSA (cm/m2): 2.1  Asc Ao diam index BSA (cm/m2): 2.0  Asc Ao diam index Ht(cm/m): 1.9  RWT: 0.57  TAPSE: 1.3 cm     Doppler Measurements & Calculations  MV E max herberth: 82.3 cm/sec  MV A max herberth: 85.9 cm/sec  MV E/A: 0.96     MV dec slope:  271.7 cm/sec2  MV dec time: 0.30 sec  PA acc time: 0.10 sec  TR max herberth: 298.0 cm/sec  TR max P.5 mmHg  E/E' avg: 15.7  Lateral E/e': 19.4  Medial E/e': 12.0     ______________________________________________________________________________  Report approved by: Glen Zhang 2024 09:29 AM         XR Abdomen Port 1 View    Narrative    EXAMINATION: US ABDOMEN COMPLETE,  2024 12:06 PM     COMPARISON: 2024     HISTORY: Verify small bowel feeding tube bedside placement    FINDINGS: Feeding tube tip in the inferior duodenum. No air-filled  dilated loops of bowel. Basilar atelectasis and consolidation in the  lungs.      Impression    IMPRESSION: Feeding tube in the third portion of the duodenum.     JAMES LAI MD         SYSTEM ID:  [id number]   XR Chest Port 1 View    Narrative    Exam: XR CHEST PORT 1 VIEW, 2024 6:49 AM    Comparison: 2024    History: s/p BSLT, AHRF in setting of RLL pneumonia    Findings:  Portable AP view of the chest. Endotracheal tube in the thoracic  trachea 2.3 cm above the gee. Partially imaged enteric tube.  Cardiomegaly with bilateral  basilar predominant opacities, mildly  increased in the upper lobes. No pneumothorax. Unchanged sternotomy  wires and surgical clips.      Impression    Impression:   Bilateral pulmonary opacities, mildly increased in the upper lobes  suggesting increased edema and underlying infection.   The endotracheal tube projects 2.3 cm above the gee.    I have personally reviewed the examination and initial interpretation  and I agree with the findings.    REJI VIVAS DO         SYSTEM ID:  [id number]   XR Chest Port 1 View    Narrative    EXAM: XR CHEST PORT 1 VIEW 2024 10:51 AM    INDICATION: CVC placement    COMPARISON: Same-day chest radiograph 06:21    TECHNIQUE: Single portable semiupright AP view of the chest.    FINDINGS:   Interval placement of right IJ central venous catheter with tip  projecting over the  cavoatrial junction. Endotracheal tube tip 3.3 cm  above the gee. Enteric tube coursing below left hemidiaphragm.  Right upper extremity midline catheter tip projecting over the axilla.  Postsurgical changes of bilateral lung transplant with intact  sternotomy wires and mediastinal clips. Similar diaphragm silhouetting  and slightly improved patchy bibasilar and perihilar opacities. No  evidence of pneumothorax.      Impression    IMPRESSION:   1. Interval placement of right IJ central venous catheter with tip  projecting over the cavoatrial junction. Slight retraction of the ET  tube to 3.3 cm above the gee, otherwise stable support devices.  2. Stable bilateral pleural effusions with slightly improved bibasilar  atelectasis versus improved patient positioning. Persistent perihilar  patchy opacities.    I have personally reviewed the examination and initial interpretation  and I agree with the findings.    ADAM GONZALEZ MD         SYSTEM ID:  [id number]   XR Surgery SHANNAN L/T 5 Min Fluoro w Stills    Narrative    This exam was marked as non-reportable because it will not be read by a   radiologist or a [place] non-radiologist provider.         XR Chest Port 1 View    Narrative    EXAM: XR CHEST PORT 1 VIEW 6/20/2024 2:35 PM    INDICATION: airway stenting    COMPARISON: Same day chest radiograph 10:42    TECHNIQUE: Single portable semiupright AP view of the chest.    FINDINGS:   Interval placement of right main stem bronchial stent. Other unchanged  support devices including right upper shotty midline tip in the  axilla, right IJ central venous catheter at the cavoatrial junction,  endotracheal tube 2.6 cm above the gee, enteric tube coursing below  left hemidiaphragm.    Postsurgical changes of bilateral lung transplant with midline  sternotomy wires intact. Unchanged mixed interstitial and airspace  opacities, slightly increased compared to prior. Stable cardiomegaly.  No pneumothorax.      Impression     IMPRESSION:   Interval placement of right mainstem bronchial stent, otherwise  unchanged support devices. Slightly increased mixed airspace and  interstitial opacities suggesting possible underlying infection with  concomitant pulmonary edema.    I have personally reviewed the examination and initial interpretation  and I agree with the findings.    ADAM GONZALEZ MD         SYSTEM ID:  [id number]

## 2024-06-22 NOTE — PROVIDER NOTIFICATION
Pt reports afraid that someone is outside her room and will come in and hurt her.  and RN at bedside reassuring patient that she is in a safe space, reorienting her to her environment and staying at bedside with her. Door closed for reassurance. Dr. Graham was at bedside, MICU aware of delirium. Seroquel BID ordered, Fentanyl stopped.

## 2024-06-22 NOTE — PROGRESS NOTES
"CRRT STATUS NOTE    DATA:  Time:  6:50 PM  Pressures WNL:  YES  Filter Status:  WDL    Problems Reported/Alarms Noted:  'Return Disconnection' and 'Low Operating Point' following a turn. 'Access Extremely Negative' at times, especially when patient moving RUE.    Supplies Present:  YES    ASSESSMENT:  Patient Net Fluid Balance:  +2L since [place]    Vital Signs:    Temp: 98.7  F (37.1  C) Temp src: Axillary BP: (!) 79/34 Pulse: 112   Resp: (!) 34 SpO2: 97 % O2 Device: Mechanical Ventilator Oxygen Delivery: 6 LPM Height: 160 cm (5' 3\") Weight: 57.6 kg (126 lb 15.8 oz)    Labs:  K+ 3.9, Mag 2.4, iCal 4.6, Phos 2.4    Goals of Therapy:  I=O    INTERVENTIONS:   Set changed at 1437 following multiple alarms and unable to continue. Since set change, occasionally experiencing more alarms which have so far been cleared with increasing BFR. Lines remain reversed at this time. RIJ trialysis line dressing changed.    PLAN:  Continue therapy per ordered goals. Contact CRRT Resource on [facility] with any questions/concerns.     " [Recent Weight Loss (___ Lbs)] : recent [unfilled] ~Ulb weight loss [Eyesight Problems] : eyesight problems [SOB on Exertion] : shortness of breath during exertion [Itching] : itching [Loss Of Hearing] : no hearing loss [Chest Pain] : no chest pain [Palpitations] : no palpitations [Lower Ext Edema] : no extremity edema [Constipation] : no constipation [Diarrhea] : no diarrhea [Heartburn] : no heartburn [Nocturia] : no nocturia [Joint Swelling] : no joint swelling [Joint Stiffness] : no joint stiffness [Dizziness] : no dizziness [Fainting] : no fainting [Anxiety] : no anxiety [Depression] : no depression [Easy Bleeding] : no tendency for easy bleeding

## 2024-06-22 NOTE — PROGRESS NOTES
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 Nephrology consulted for management of HD while admitted.      Interval History :   Mrs Blue had CRRT started via temp line on 6/20, unable to do any significant UF for the past 2 days given high pressors requirements   Assessment & Recommendations:   ESRD-ESKD: pt dialyzes MWF at [facility] (ph [phone], f [phone]) with Dr. Glasgow. Run time: 3.5 hrs. EDW 59.7 kg. Access: L AVF. +heparin 1,500u bolus.                  -Appreciate team placing tri-alysis line to run norepi  -Pursue CRRT, 4k baths, I=O as able today, 500u heparin/h through circuit.                -No need for new dialysis consent, continuing long term HD for ESRD.                -L AVF with remote hx of needing intervention, no issue recently.      Outpt Rx:       Volume-starting to have some edema.  Will see how I/O's and hemodynamics trend.       Pulm-Admitted with hypercapnic resp failure, suspected PNA. ID involved, getting bronch.  Currently on Zosyn, bactrim, Posaconazole, Azithromycin and Vanco x1 dose so far.       Electrolytes-Stable on CRRT, bicarb 19 with low level lactate but pH 7.33.       BMD-Mg normal Phos running low, can replace as CRRT will further clear Mg/Phos.      Anemia-Hgb 9.6 -> 8.1, on venofer 50mg weekly but will hold while treating for infection. On Mircera 60mcg [id number], will cover with short term Epo while admitted when stable enough for HD.       Nutrition-Novasource renal started      Time spent: 50 minutes on this date of encounter for chart review, physical exam, medical decision making  "and co-ordination of care.      Recommendations were communicated to primary team via verbal communication.     Jonna Rodas MD  Clinical Nurse Specialist  [phone]    Review of Systems:   I reviewed the following systems:  ROS not done due to vent/sedation    Physical Exam:   I/O last 3 completed shifts:  In: 3214.62 [I.V.:1359.62; NG/GT:695; IV Piggyback:500]  Out: 0    BP (!) 79/34   Pulse 100   Temp 97.7  F (36.5  C) (Axillary)   Resp 28   Ht 1.6 m (5' 3\")   Wt 57.6 kg (126 lb 15.8 oz)   LMP 06/07/2014 (Exact Date)   SpO2 98%   BMI 22.49 kg/m       GENERAL APPEARANCE: Intubated but alert.    EYES: No scleral icterus  Pulmonary: lungs Rhonchi to auscultation with equal breath sounds bilaterally  CV: Regular rhythm, normal rate   - Edema none  GI: soft, nontender, normal bowel sounds  MS: no evidence of inflammation in joints, no muscle tenderness  : No Basilio  SKIN: no rash, warm, dry  NEURO: No focal deficits    Labs:   All labs reviewed by me  Electrolytes/Renal -   Recent Labs   Lab Test 06/22/24  0801 06/22/24  0441 06/22/24  0439 06/22/24  0041 06/22/24  0039 06/21/24  1638 06/21/24  1626 06/21/24  1208 06/21/24  0356   NA  --   --  137  137  --  136  --  135  --  134*  134*   POTASSIUM  --   --  3.9  3.9  --  3.2*  --  3.5  --  3.6  3.6   CHLORIDE  --   --  106  106  --  106  --  104  --  103  103   CO2  --   --  21*  21*  --  21*  --  21*  --  20*  20*   BUN  --   --  29.0*  29.0*  --  31.7*  --  33.0*  --  32.3*  32.3*   CR  --   --  1.67*  1.67*  --  1.85*  --  2.30*  --  2.72*  2.72*   * 166* 180*  180*   < > 217*   < > 225*   < > 158*  155*  155*   CHELSEY  --   --  8.0*  8.0*  --  7.9*  --  7.7*  --  7.9*  7.9*   MAG  --   --  2.3  --  2.2  --  2.2  --  2.2   PHOS  --   --  2.5  --   --   --  3.0  --  2.6    < > = values in this interval not displayed.       CBC -   Recent Labs   Lab Test 06/22/24  0439 06/22/24  0034 06/21/24  0356   WBC 6.0 6.4 5.8   HGB 8.1* 8.4* " 9.6*   PLT 95* 84* 98*       LFTs -   Recent Labs   Lab Test 06/22/24  0439 06/22/24  0039 06/21/24  1626 06/21/24  0356 06/20/24  1506 06/20/24  0420   ALKPHOS 89  --   --  123  --  153*   BILITOTAL 1.0  --   --  1.1  --  0.9   ALT 17  --   --  20  --  23   AST 11  --   --  12  --  26   PROTTOTAL 4.6*  --   --  4.7*  --  5.0*   ALBUMIN 2.3*  2.3* 2.3* 2.2* 2.4*  2.4*   < > 2.7*    < > = values in this interval not displayed.       Iron Panel -   Recent Labs   Lab Test 02/03/21  0415 12/13/18  1033 08/01/18  0921   IRON 51 16* 93   IRONSAT 36 7* 37   YOLA  --  302* 571*           Current Medications:  Current Facility-Administered Medications   Medication Dose Route Frequency Provider Last Rate Last Admin    acetylcysteine (MUCOMYST) 10 % nebulizer solution 4 mL  4 mL Inhalation 4x Daily Kajal Chong APRN CNP   4 mL at 06/21/24 2135    albuterol (PROVENTIL) neb solution 2.5 mg  2.5 mg Nebulization 4x Daily Kajal Chong APRN CNP   2.5 mg at 06/21/24 2136    B and C vitamin Complex with folic acid (NEPHRONEX) liquid 5 mL  5 mL Per Feeding Tube Daily Awa Watt MD   5 mL at 06/22/24 0806    calcium carbonate (TUMS) chewable tablet 500 mg  500 mg Oral Once per day on Sunday Tuesday Thursday Saturday Josesito Pratt MD   500 mg at 06/22/24 0813    chlorhexidine (PERIDEX) 0.12 % solution 15 mL  15 mL Mouth/Throat Q12H Chio Cortez MD   15 mL at 06/22/24 0806    DAPTOmycin (CUBICIN) 400 mg in sodium chloride 0.9 % 100 mL intermittent infusion  8 mg/kg (Dosing Weight) Intravenous Q24H Awa Watt MD   400 mg at 06/21/24 1459    fludrocortisone (FLORINEF) tablet 0.1 mg  0.1 mg Oral or Feeding Tube Daily Evelyn Roberts APRN CNP   0.1 mg at 06/22/24 0806    hydrocortisone sodium succinate PF (solu-CORTEF) injection 50 mg  50 mg Intravenous Q6H Evelyn Roberts APRN CNP   50 mg at 06/22/24 0427    insulin aspart (NovoLOG) injection (RAPID ACTING)  1-12 Units Subcutaneous Q4H Adilia Hunt MD   2  Units at 06/22/24 0806    levofloxacin (LEVAQUIN) infusion 500 mg  500 mg Intravenous Q24H Velez Reyes, German,  mL/hr at 06/21/24 1646 500 mg at 06/21/24 1646    [Held by provider] magnesium chloride CR tablet 1,070 mg  1,070 mg Oral Once per day on Sunday Tuesday Thursday Saturday Josesito Pratt MD        meropenem (MERREM) 1 g vial to attach to  mL bag  1 g Intravenous Q12H Velez Reyes, German, MD   1 g at 06/22/24 0504    [Held by provider] metoprolol tartrate (LOPRESSOR) tablet 25 mg  25 mg Oral BID Velez Reyes, German, MD        minocycline (MINOCIN) 100 mg in sodium chloride 0.9 % 100 mL intermittent infusion  100 mg Intravenous Q12H Kajal Chong APRN  mL/hr at 06/22/24 0023 100 mg at 06/22/24 0023    montelukast (SINGULAIR) tablet 10 mg  10 mg Oral At Bedtime Velez Reyes, German, MD   10 mg at 06/21/24 2228    pantoprazole (PROTONIX) 2 mg/mL suspension 40 mg  40 mg Per Feeding Tube Daily Josesito Pratt MD        posaconazole (NOXAFIL) DR tablet TBEC 300 mg  300 mg Per Feeding Tube Daily Josesito Pratt MD   300 mg at 06/21/24 1150    [Held by provider] predniSONE (DELTASONE) tablet 5 mg  5 mg Oral Daily Velez Reyes, German, MD   5 mg at 06/21/24 0822    protein modular (PROSOURCE TF20) packet 1 packet  1 packet Per Feeding Tube BID Awa Watt MD   1 packet at 06/22/24 0807    sodium chloride (PF) 0.9% PF flush 10 mL  10 mL Intracatheter Q8H Kajal Chong APRN CNP        sodium chloride (PF) 0.9% PF flush 10 mL  10 mL Intracatheter Q8H Kajal Chong APRN CNP   10 mL at 06/22/24 0807    sodium chloride 0.9 % neb solution 3 mL  3 mL Nebulization BID Hayden Lou MD   3 mL at 06/20/24 2031    sulfamethoxazole-trimethoprim (BACTRIM) 400-80 MG per tablet 1 tablet  1 tablet Oral or Feeding Tube Daily Josesito Pratt MD   1 tablet at 06/21/24 2006    [Held by provider] tacrolimus (GENERIC) suspension 0.6 mg  0.6 mg Oral QAM Velez Reyes, German, MD   0.6 mg at 06/21/24 8158     And    [Held by provider] tacrolimus (GENERIC) suspension 0.7 mg  0.7 mg Oral QPM Velez Reyes, German, MD   0.7 mg at 06/20/24 1810    Vitamin D3 (CHOLECALCIFEROL) tablet 50 mcg  50 mcg Oral Once per day on Monday Wednesday Friday Velez Reyes, German, MD   50 mcg at 06/21/24 0834     Current Facility-Administered Medications   Medication Dose Route Frequency Provider Last Rate Last Admin    dextrose 10% infusion   Intravenous Continuous PRN Awa Watt MD        dialysate for CVVHD & CVVHDF (Phoxillum BK4/2.5)  12.5 mL/kg/hr CRRT Continuous Hardy Tran APRN  mL/hr at 06/22/24 0453 12.5 mL/kg/hr at 06/22/24 0453    fentaNYL (SUBLIMAZE) infusion   mcg/hr Intravenous Continuous Chio Cortez MD 1 mL/hr at 06/22/24 0800 50 mcg/hr at 06/22/24 0800    heparin (porcine) 20,000 units in 20 mL ANTICOAGULANT infusion (syringe from pharmacy)  500 Units/hr CRRT Continuous Hardy Tran APRN CNS 0.5 mL/hr at 06/22/24 0800 500 Units/hr at 06/22/24 0800    norepinephrine (LEVOPHED) 16 mg in  mL infusion MAX CONC CENTRAL LINE  0.01-0.6 mcg/kg/min (Dosing Weight) Intravenous Continuous Kajal Chong APRN CNP 4.8 mL/hr at 06/22/24 0830 0.1 mcg/kg/min at 06/22/24 0830    POST-filter replacement solution for CVVHD & CVVHDF (Phoxillum BK4/2.5)   CRRT Continuous Hardy Tran APRN  mL/hr at 06/21/24 1414 New Bag at 06/21/24 1414    PRE-filter replacement solution for CVVHD & CVVHDF (Phoxillum BK4/2.5)  12.5 mL/kg/hr CRRT Continuous Hardy Tran APRN  mL/hr at 06/22/24 0453 12.5 mL/kg/hr at 06/22/24 0453    vasopressin 1 unit/mL MAX Conc (PITRESSIN) infusion  4 Units/hr Intravenous Continuous Chio Cortez MD 4 mL/hr at 06/22/24 0800 4 Units/hr at 06/22/24 0800

## 2024-06-22 NOTE — PROGRESS NOTES
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     Changes today  - Adjust pulmonary toilet (mucomyst BID alternating with hypertonic saline BID), per Transplant Pulm  - Stop Volera BID due to discomfort  - Plan for CRRT net even today  - For agitation, adjust to seroquel 50 BID and every day PRN and fentanyl 25mcg/hr    PLAN:    Neuro:  # Pain   - Fentanyl gtt 50-100mcg/hr  - PO oxycodone 5mg q6H  - Acetaminophen 325mg q4H    # Sedation  # Toxic metabolic encephalopathy, worsening  Presented with 2-3 days of lethargy, decreased appetite, and fatigue. Previously has improved with BiPAP/intubation. Ongoing lethargy in setting of sepsis and delirium in the setting of high-dose steroid therapy.   - Quetiapine 50mg BID and at bedtime PRN  - Fentanyl gtt  - RAAS goal 0 to -1    Pulmonary:  # Acute on chronic hypoxic hypercapnic respiratory failure  # Pulmonary edema  # Acute respiratory distress syndrome, progressive  Presented to the ED on 6/18/2024 with SOB and hypercapnic respiratory failure. Workup significant for Stenotrophomonas pneumonia. Acute on chronic hypoxic hypercapnic respiratory failure likely secondary to pneumonia with exacerbation by pulmonary edema (diffuse bilateral opacities on CXR, uptrending patient weight 50kg to 57kg). On 6/21, oxygenation and lung pressures consistent with ARDS physiology (6/21 P/F ratio 206, elevated plateau pressures 30-34). On 6/22, progression of ARDS (6/22 P/F ratio 150) with ongoing  elevated plateau pressures. Will plan to continue antibiotics and aggressive pulmonary toilet, volume management (net even with CRRT I/O), and lung protective ventilation () to reduce risk of barotrauma while increasing PEEP (8) to maximize pulmonary recruitment.   - Antibiotics, as below  - Volume management with CRRT, as below  - Mechanical ventilation, wean as able   Hold daily SBT trial with ARDS physiology   Lung protective ventilation (tidal volume 300)   Increase PEEP 8 to improve recruitment  - Pulmonary toilet   Mucomyst BID   Hypertonic saline BID, alternate with mucomyst   Albuterol neb QID  - Did not tolerate volera    Vent Mode: CMV/AC  (Continuous Mandatory Ventilation/ Assist Control)  FiO2 (%): 50 %  Resp Rate (Set): 20 breaths/min  Tidal Volume (Set, mL): 300 mL  PEEP (cm H2O): 8 cmH2O  Resp: (!) 32    # Healthcare-associated pneumonia, Stenotrophomonas and Enterococcus faecalis  # Positive bronchoscopy galactomannan  Presented to the ED on 6/18/2024 with SOB and hypercapnic respiratory failure. Found on bronchoscopy (6/19) to have RML obstruction by narrowed bronchus intermedius as well as copious mucopurulent secretions/mucus plugging. Bronchoscopy culture (6/19) is growing Stenotrophonomas and Enterococcus faecalis (sensitivities pending) and positive bronchoscopy galactomannan (6/19 1/87). Previously treated for Stenotrophonomas/aspergillus pneumonia in 11/2023 with subsequent sputum culture (2/2024) negative for both Stenotrophonomas/Aspergillus. Can consider repeat Stenotrophomonas infection vs opportunistic infection from colonized microbe. Lower suspicion for aspergillus pneumonia (has been on posaconazole chronically) but this is possible. Plan to continue antibiotics, pulmonary toilet. With worsening respiratory status, can consider broadening empiric antibiotics from Zosyn to Meropenem  - Transplant pulmonology consult, appreciate recs  - Transplant ID consult, appreciate recs  -  Antibiotics   S/p vancomycin (6/18-6/20)   S/p zosyn (6/18- 6/21)  PTA posaconazole 300mg every day (treatment dose)  Minocycline (6/20- present)    Meropenem (6/21- present)    Levofloxacin (6/21-present) per transplant ID       # Recurrent right middle bronchus stenosis s/p dilation and stent (6/20/2024)  Has history (bronchoscopy 2/15/24) demonstrating narrowing of right mainstem transplant anastomosis and bronchus intermedius. CT chest (6/18/2024) and bronchoscopy (6/19/2024) demonstrated occlusion of right middle bronchus. With concern for post-obstructive pneumonia, interventional pulmonology was consulted, and completed bronchoscopy (6/20/2024) with tissue debulking, right middle bronchus balloon dilation to 11 mm, stent placement in right main bronchus, and bifurcating cast placement in right upper bronchus. Plan for saline nebs BID and outpatient bronchoscopy in 6 weeks.   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 Per transplant, will stay at current does and reassess after rechecking levels tomorrow AM.  - Transplant pulm consult, appreciate recs  - PTA nebs               Fluticasone-viilanterol (breo ellipta) every day - HOLD with respiratory infection   Montelukast every day   - Immunosuppressants per Tx Pulm:                PTA Tacrolimus (dosing per tx pulm), recheck levels on 6/21               PTA Prednisone 5 mg daily - HOLD with stress dose steroids   PTA azathioprine - HOLD per Transplant pulmonology with repeat infections    Cardiovascular:  # Shock, improving  On 6/19/2024, developed sepsis (hypotension 80s/50s, tachypnea 24) in the setting of stenotrophomonas pneumonia/enterococcus faecium VRE UTI. Etiology of shock likely distributive; less likely  hypovolemic (not pulling volumes with CRRT), medication-associated (weaned off of propofol gtt) or obstructive (low suspicion for PE, echo 6/19 without evidence of cardiac dysfunction). Maximum pressor needs 6/21 evening, with improvement with adjusted antibiotic therapy.   - IV pressors   Norepi gtt   Vasopressin gtt  - MAP goal >65  - Stress dose steroids   Hydrocortisone 50mg q6H (6/21-present)   Fludrocortisone 0.1mg every day (6/21-present)    # Demand Ischemia   Presented with elevated troponin (peak 499). Not associated with chest pain or hypoxia. EKG without ST elevations/depressions or T wave inversions. Suspect demand ischemia in the setting of sepsis.   - Monitor symptoms  - Telemetry     # Hx Paroxysmal A-Fib  # Hx Pulmonary Hypertension (45 mmHg)  History of pulmonary hypertension (45 mmHg, echo 10/2023) in the setting of ILD and paroxysmal afib on PTA metoprolol tartrate BID. Not on anticoagulation for afib.   - PTA metoprolol tartrate 25mg BID - HOLD with soft pressures    GI/Nutrition:  # Nutrition  NJT placed (6/19/2024).   - Nutrition consult   Tube feeds 30ml/hr   Fiber 1 packet daily    # Diarrhea  On 6/21, had 8 bowel movements. Occurred in the setting of scheduled bowel regimen and starting new antibiotics (meropenem, levofloxacin). C diff negative. Will stop scheduled bowel regimen and start fiber.  - Bowel regimen   Miralax daily PRN   Senna daily PRN    # Elevated alk phos, resolved  Admission with elevated alkaline phosphatase (237) with elevated GGT consistent with hepatic etiology. Not associated with transaminitis. Etiology may be medication associated vs cholestatic. Now resolved.  - Monitor LFTs    Renal/Fluids/Electrolytes:  # ESRD on iHD (M,W,F)  History of ESRD 2/2 Tacrolimus toxicity on HD (MWF). With soft blood pressures, placed RIJ (6/20/2024) and started CRRT (6/20/2024).  - Nephrology consult   CRRT: aim for net even  - Renally-dosed medications  - RN electrolyte  replacement       Endocrine:  # Risk for hyperglycemia with stress-dose steroids  - HSSI     ID:  # Stenotrophomonas pneumonia  # Positive bronchoscopy galactomannan  Presented to the ED on 6/18/2024 with SOB and hypercapnic respiratory failure. Found on bronchoscopy (6/19) to have RML obstruction by narrowing bronchus intermedius as well as copious mucopurulent secretions/mucus plugging. Sputum culture (6/18) is growing Stenotrophonomas pneumonia. BAL cultures (6/19) pending. Positive bronchoscopy galactomannan (6/19 1.87) Previously treated for Stenotrophonomas/aspergillus pneumonia in 11/2023 with subsequent sputum culture (2/2024) negative for both Stenotrophonomas/Aspergillus. Can consider repeat Stenotrophomonas infection vs opportunistic infection from colonized microbe. Lower suspicion for aspergillus pneumonia (has been on posaconazole chronically) but this is possible. With worsening respiratory status and increasing pressor needs on 6/21, broadened antibiotics to Meropenem and added levofloxacin as second agent to target Stenotrophomonas (per transplant ID).   - Transplant pulmonology consult, appreciate recs  - Transplant ID consult, appreciate recs  - Antibiotics   S/p vancomycin (6/18-6/20)   S/p zosyn (6/18- 6/21)  PTA posaconazole 300mg every day (treatment dose)   Meropenem (6/21- present) in discussion with transplant ID    Levofloxacin (6/21-present) per transplant ID  Minocycline (6/20- present)      # UTI, Enterococcus faecium VRE   # UTI, Non-lactose fermenting GNR  With progressive IV pressor needs, obtained broad infectious workup which demonstrated UCx (6/19/2024) with Enterococcus faecium VR and non-lactose fermenting GNR (e.g. could be pseudomonas or proteus). With worsening shock, will treat VRE, follow GNR speciation/sensitivities.  - Antibiotics   Daptomycin (6/21 - present) (does not have pulmonary penetration)    With improving sepsis, can consider discontinuing due to lower overall  suspicion of active cystitis    # Hx Aspergillus PNA (11/2023)  # Hx PJP PNA (1/2021)  # Hx CMV viremia, recurrent  # Hx EBV viremia  - Transplant ID consult, appreciate recs  PTA posaconazole (Treatment for hx of aspergillus PNA)  PTA azithromycin 250mg qd (CLAD ppx)  PTA bactrim (PJP ppx)     Hematology:    # Acute on Chronic Normocytic Anemia  # Thrombocytopenia, chronic  History of chronic anemia in the setting of kidney disease (baseline Hgb 10-11). Upon admission, Hgb 9. May be bone marrow suppression in the setting of chronic illness; potential contribution by blood loss with CRRT filter changes (~150-200c). Peripheral smear (6/18/2024) without evidence of schistocytes.  - Monitor CBC    Musculoskeletal:  - PT / OT consult     Skin:  - No acute concerns.     General Cares/Prophylaxis:    DVT Prophylaxis: Heparin SQ  GI Prophylaxis: PPI  Restraints: None  Family Communication:  at bedside  Code Status: Full Code     Lines/tubes/drains:  - PIV x2, midline  - RIJ  - A line  - NGT  - ETT    Disposition:  - Medical ICU     Patient seen and findings/plan discussed with medical ICU staff, Dr. Mosquera.    Awa Watt MD      Clinically Significant Risk Factors        # Hypokalemia: Lowest K = 3.2 mmol/L in last 2 days, will replace as needed   # Hypocalcemia: Lowest iCa = 4.2 mg/dL in last 2 days, will monitor and replace as appropriate   # Hypomagnesemia: Lowest Mg = 1.6 mg/dL in last 2 days, will replace as needed   # Hypoalbuminemia: Lowest albumin = 2.2 g/dL at 6/21/2024  4:26 PM, will monitor as appropriate   # Thrombocytopenia: Lowest platelets = 84 in last 2 days, will monitor for bleeding                 #Precipitous drop in Hgb/Hct: Lowest Hgb this hospitalization: 8.1 g/dL. Will continue to monitor and treat/transfuse as appropriate.         # Financial/Environmental Concerns: none            ====================================  INTERVAL HISTORY:   Increased anxiety and agitation overnight with  hallucinations this morning. Notes that she is feeling short of breath today.    OBJECTIVE:   1. VITAL SIGNS:   Temp:  [97  F (36.1  C)-99.5  F (37.5  C)] 99  F (37.2  C)  Pulse:  [] 119  Resp:  [25-38] 29  BP: ()/(31-53) 100/47  MAP:  [55 mmHg-140 mmHg] 74 mmHg  Arterial Line BP: ()/() 90/63  FiO2 (%):  [40 %-55 %] 50 %  SpO2:  [86 %-100 %] 98 %  Vent Mode: CMV/AC  (Continuous Mandatory Ventilation/ Assist Control)  FiO2 (%): 50 %  Resp Rate (Set): 19 breaths/min  Tidal Volume (Set, mL): 300 mL  PEEP (cm H2O): 8 cmH2O  Resp: 29    2. INTAKE/ OUTPUT:   I/O last 3 completed shifts:  In: 3214.62 [I.V.:1359.62; NG/GT:695; IV Piggyback:500]  Out: 0     3. PHYSICAL EXAMINATION:  General: Laying in bed, NAD   HEENT: Atraumatic, moist mucous membranes   Pulm/Resp: Lungs coarse with rhonchi R>L, breathing nonlabored on mechanical ventilation, minimal secretions from ET tube.   CV: RRR, no m/r/g   Abdomen: Soft, non-distended, non-tender  Ext: No edema, pulses 2+ radial, pedal  Incisions/Skin: No rashes or lesions  Neuro: Sedated, arouses to voice, follows commands, moving all extremities    4. LABS:   Arterial Blood Gases   Recent Labs   Lab 06/22/24  0747 06/22/24  0035 06/21/24 2220 06/21/24 2022   PH 7.32* 7.33* 7.33* 7.27*   PCO2 46* 43 43 50*   PO2 75* 71* 79* 68*   HCO3 24 23 23 23     Complete Blood Count   Recent Labs   Lab 06/22/24  0439 06/22/24  0034 06/21/24  0356 06/20/24  0420   WBC 6.0 6.4 5.8 6.0   HGB 8.1* 8.4* 9.6* 10.4*   PLT 95* 84* 98* 109*     Basic Metabolic Panel  Recent Labs   Lab 06/22/24  0441 06/22/24  0439 06/22/24  0041 06/22/24  0039 06/21/24  1638 06/21/24  1626 06/21/24  1208 06/21/24  0356   NA  --  137  137  --  136  --  135  --  134*  134*   POTASSIUM  --  3.9  3.9  --  3.2*  --  3.5  --  3.6  3.6   CHLORIDE  --  106  106  --  106  --  104  --  103  103   CO2  --  21*  21*  --  21*  --  21*  --  20*  20*   BUN  --  29.0*  29.0*  --  31.7*  --  33.0*  --   32.3*  32.3*   CR  --  1.67*  1.67*  --  1.85*  --  2.30*  --  2.72*  2.72*   * 180*  180* 209* 217*   < > 225*   < > 158*  155*  155*    < > = values in this interval not displayed.     Liver Function Tests  Recent Labs   Lab 06/22/24  0439 06/22/24  0039 06/21/24  1626 06/21/24  0356 06/20/24  1506 06/20/24  0420 06/19/24  0613 06/18/24  1803 06/18/24  1418   AST 11  --   --  12  --  26 31   < >  --    ALT 17  --   --  20  --  23 28   < > 39   ALKPHOS 89  --   --  123  --  153* 175*   < > 237*   BILITOTAL 1.0  --   --  1.1  --  0.9 0.8   < > 0.6   ALBUMIN 2.3*  2.3* 2.3* 2.2* 2.4*  2.4*   < > 2.7* 2.7*   < > 3.6   INR  --   --   --   --   --   --   --   --  0.98    < > = values in this interval not displayed.     Coagulation Profile  Recent Labs   Lab 06/18/24  1418   INR 0.98       5. RADIOLOGY:   Recent Results (from the past 24 hour(s))   XR Chest Port 1 View    Narrative    Exam: Chest 1 view portable semiupright 6/21/2024 4:24 PM     History:  hypoxia    Comparison: 6/20/2024    Findings: Endotracheal tube, median sternotomy, feeding tube, right IJ  central line, right bronchial stents again noted. Shifting  interstitial and airspace opacities bilaterally increased on the left,  decreased on the right.. Cardiac silhouette is nonenlarged. Pleural  calcifications are noted on the left.      Impression    IMPRESSION: Shifting interstitial and airspace opacities greater on  the left possibly edema or infection.    ADAM GONZALEZ MD         SYSTEM ID:  [id number]   XR Chest Port 1 View    Narrative    XR CHEST PORT 1 VIEW  6/21/2024 9:47 PM     HISTORY:  increasing FiO2 needs       COMPARISON:  5 hours prior    TECHNIQUE: Portable, semiupright, frontal projection radiograph of the  chest.    FINDINGS:   Stable position of right-sided midline, right IJ central venous  catheter, ET tube, feeding tube.    Patient is significantly rightward rotated limiting evaluation and  comparison. Silhouetting of the  heart borders. Silhouetting of the  diaphragms. Continued left-sided hazy and patchy opacities. Continued  right-sided hazy opacities in the right lung base and adjacent to the  major fissure. No appreciable pneumothorax.        Impression    IMPRESSION:  Stable exam with unchanged support devices and unchanged left greater  than right mixed interstitial and airspace opacities likely  representing pulmonary edema/infection.    I have personally reviewed the examination and initial interpretation  and I agree with the findings.    EDIE VILLA MD         SYSTEM ID:  [id number]

## 2024-06-23 NOTE — PROGRESS NOTES
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 Nephrology consulted for management of HD while admitted.      Interval History :   Mrs Blue had CRRT started via temp line on 6/20, unable to do any significant UF for the prior 2 days given high pressors requirements however hemodynamics significantly improved today and UF is being titrated up. In need of a new arterial line to assess more accurately her blood pressure.  Assessment & Recommendations:   ESRD-ESKD: pt dialyzes MWF at [facility] (ph [phone], f [phone]) with Dr. Glasgow. Run time: 3.5 hrs. EDW 59.7 kg. Access: L AVF. +heparin 1,500u bolus.                  -Appreciate team placing tri-alysis line to run norepi  -Pursue CRRT, 4k baths,  mL/h as able today, 500u heparin/h through circuit.                -No need for new dialysis consent, continuing long term HD for ESRD.                -L AVF with remote hx of needing intervention, no issue recently.      Outpt Rx:       Volume-starting to have some edema.  Will see how I/O's and hemodynamics trend.       Pulm-Admitted with hypercapnic resp failure, suspected PNA. ID involved, getting bronch.  Currently on Zosyn, bactrim, Posaconazole, Azithromycin and Vanco x1 dose so far.       Electrolytes-Stable on CRRT, bicarb level improved to 23 with improved hemodynamics     BMD-Mg normal Phos running low, can replace as CRRT will further clear Mg/Phos.      Anemia-Hgb 9.6 -> 8.1, on venofer 50mg weekly but will hold while treating for infection. On Mircera 60mcg j5hibck, will cover with short term Epo while admitted when  "stable enough for HD.       [facility]      Time spent: 50 minutes on this date of encounter for chart review, physical exam, medical decision making and co-ordination of care.      Recommendations were communicated to primary team via verbal communication.     Jonna Rodas MD  Clinical Nurse Specialist  [phone]    Review of Systems:   I reviewed the following systems:  ROS not done due to vent/sedation    Physical Exam:   I/O last 3 completed shifts:  In: 2689.82 [I.V.:1284.82; Other:5; NG/GT:680]  Out: 1716 [Other:1716]   BP (!) 90/25   Pulse 90   Temp 99.5  F (37.5  C) (Axillary)   Resp 28   Ht 1.6 m (5' 3\")   Wt 57.7 kg (127 lb 3.3 oz)   LMP 06/07/2014 (Exact Date)   SpO2 99%   BMI 22.53 kg/m       GENERAL APPEARANCE: Intubated but alert.    EYES: No scleral icterus  Pulmonary: lungs Rhonchi to auscultation with equal breath sounds bilaterally  CV: Regular rhythm, normal rate   - Edema none  GI: soft, nontender, normal bowel sounds  MS: no evidence of inflammation in joints, no muscle tenderness  : No Basilio  SKIN: no rash, warm, dry  NEURO: No focal deficits    Labs:   All labs reviewed by me  Electrolytes/Renal -   Recent Labs   Lab Test 06/23/24  0745 06/23/24  0414 06/22/24  1614 06/22/24  1613 06/22/24  0441 06/22/24  0439   NA  --  139  --  139  --  137  137   POTASSIUM  --  4.2  --  3.9  --  3.9  3.9   CHLORIDE  --  109*  --  109*  --  106  106   CO2  --  22  --  21*  --  21*  21*   BUN  --  27.0*  --  28.0*  --  29.0*  29.0*   CR  --  1.15*  --  1.39*  --  1.67*  1.67*   * 172*  167*   < > 185*   < > 180*  180*   CHELSEY  --  8.0*  --  7.9*  --  8.0*  8.0*   MAG  --  2.3  --  2.4*  --  2.3   PHOS  --  3.5  --  2.4*  --  2.5    < > = values in this interval not displayed.       CBC -   Recent Labs   Lab Test 06/23/24  0414 06/22/24  1615 06/22/24  0439   WBC 5.4 6.0 6.0   HGB 7.8* 8.0* 8.1*   PLT 84* 89* 95*       LFTs -   Recent Labs   Lab Test " 06/23/24  0414 06/22/24  1613 06/22/24  0439 06/21/24  1626 06/21/24  0356   ALKPHOS 78  --  89  --  123   BILITOTAL 0.8  --  1.0  --  1.1   ALT 20  --  17  --  20   AST 17  --  11  --  12   PROTTOTAL 4.7*  --  4.6*  --  4.7*   ALBUMIN 2.4* 2.3* 2.3*  2.3*   < > 2.4*  2.4*    < > = values in this interval not displayed.       Iron Panel -   Recent Labs   Lab Test 02/03/21  0415 12/13/18  1033 08/01/18  0921   IRON 51 16* 93   IRONSAT 36 7* 37   YOLA  --  302* 571*           Current Medications:  Current Facility-Administered Medications   Medication Dose Route Frequency Provider Last Rate Last Admin    acetylcysteine (MUCOMYST) 10 % nebulizer solution 4 mL  4 mL Inhalation BID Velez Reyes, German, MD   4 mL at 06/23/24 0821    azithromycin (ZITHROMAX) tablet 250 mg  250 mg Oral or Feeding Tube Daily Velez Reyes, German, MD   250 mg at 06/23/24 0748    B and C vitamin Complex with folic acid (NEPHRONEX) liquid 5 mL  5 mL Per Feeding Tube Daily Awa Watt MD   5 mL at 06/23/24 0748    calcium carbonate (TUMS) chewable tablet 500 mg  500 mg Oral Once per day on Sunday Tuesday Thursday Saturday Josesito Pratt MD   500 mg at 06/23/24 0748    chlorhexidine (PERIDEX) 0.12 % solution 15 mL  15 mL Mouth/Throat Q12H Chio Cortez MD   15 mL at 06/23/24 0748    DAPTOmycin (CUBICIN) 400 mg in sodium chloride 0.9 % 100 mL intermittent infusion  8 mg/kg (Dosing Weight) Intravenous Q24H Awa Watt MD   400 mg at 06/22/24 1405    fiber modular (NUTRISOURCE FIBER) packet 1 packet  1 packet Per Feeding Tube Daily Awa Watt MD   1 packet at 06/22/24 1715    fludrocortisone (FLORINEF) tablet 0.1 mg  0.1 mg Oral or Feeding Tube Daily Evelyn Roberts APRN CNP   0.1 mg at 06/23/24 0748    hydrocortisone sodium succinate PF (solu-CORTEF) injection 50 mg  50 mg Intravenous Q6H Evelyn Roberts APRN CNP   50 mg at 06/23/24 0417    insulin aspart (NovoLOG) injection (RAPID ACTING)  1-12 Units Subcutaneous Q4H Gilbert  MD Adilia   1 Units at 06/23/24 0749    levalbuterol (XOPENEX) neb solution 0.63 mg  0.63 mg Nebulization 4x Daily Gareth Mosquera MD   0.63 mg at 06/23/24 0821    levofloxacin (LEVAQUIN) infusion 500 mg  500 mg Intravenous Q24H Velez Reyes, German,  mL/hr at 06/22/24 1604 500 mg at 06/22/24 1604    [Held by provider] magnesium chloride CR tablet 1,070 mg  1,070 mg Oral Once per day on Sunday Tuesday Thursday Saturday Josesito Pratt MD        meropenem (MERREM) 1 g vial to attach to  mL bag  1 g Intravenous Q12H Velez Reyes, German, MD   1 g at 06/23/24 0506    [Held by provider] metoprolol tartrate (LOPRESSOR) tablet 25 mg  25 mg Oral BID Velez Reyes, German, MD        minocycline (MINOCIN) 100 mg in sodium chloride 0.9 % 100 mL intermittent infusion  100 mg Intravenous Q12H Kajal Chong APRN  mL/hr at 06/23/24 0027 100 mg at 06/23/24 0027    montelukast (SINGULAIR) tablet 10 mg  10 mg Oral At Bedtime Velez Reyes, German, MD   10 mg at 06/22/24 2210    pantoprazole (PROTONIX) 2 mg/mL suspension 40 mg  40 mg Per Feeding Tube Daily Josesito Pratt MD   40 mg at 06/22/24 1157    posaconazole (NOXAFIL) DR tablet TBEC 300 mg  300 mg Per Feeding Tube Daily Josesito Pratt MD   300 mg at 06/22/24 1157    [Held by provider] predniSONE (DELTASONE) tablet 5 mg  5 mg Oral Daily Velez Reyes, German, MD   5 mg at 06/21/24 0822    protein modular (PROSOURCE TF20) packet 1 packet  1 packet Per Feeding Tube BID Awa Watt MD   1 packet at 06/23/24 0749    QUEtiapine (SEROquel) tablet 50 mg  50 mg Oral or Feeding Tube BID Awa Watt MD   50 mg at 06/23/24 0748    sodium chloride (NEBUSAL) 3 % neb solution 3 mL  3 mL Nebulization BID Velez Reyes, German, MD   3 mL at 06/22/24 2111    sodium chloride (PF) 0.9% PF flush 10 mL  10 mL Intracatheter Q8H Kajal Chong APRN CNP        sodium chloride (PF) 0.9% PF flush 10 mL  10 mL Intracatheter Q8H Kajal Chong APRN CNP   10 mL at 06/23/24 0714     sulfamethoxazole-trimethoprim (BACTRIM) 400-80 MG per tablet 1 tablet  1 tablet Oral or Feeding Tube Daily Josesito Pratt MD   1 tablet at 06/22/24 2010    [Held by provider] tacrolimus (GENERIC) suspension 0.6 mg  0.6 mg Oral QAM Velez Reyes, German, MD   0.6 mg at 06/21/24 0822    And    [Held by provider] tacrolimus (GENERIC) suspension 0.7 mg  0.7 mg Oral QPM Velez Reyes, German, MD   0.7 mg at 06/20/24 1810    Vitamin D3 (CHOLECALCIFEROL) tablet 50 mcg  50 mcg Oral Once per day on Monday Wednesday Friday Velez Reyes, German, MD   50 mcg at 06/21/24 0834     Current Facility-Administered Medications   Medication Dose Route Frequency Provider Last Rate Last Admin    dexmedeTOMIDine (PRECEDEX) 4 mcg/mL in sodium chloride 0.9 % 100 mL infusion  0.1-1.2 mcg/kg/hr (Dosing Weight) Intravenous Continuous Chio Cortez MD 8.9 mL/hr at 06/23/24 1000 0.7 mcg/kg/hr at 06/23/24 1000    dextrose 10% infusion   Intravenous Continuous PRN Awa Watt MD        dialysate for CVVHD & CVVHDF (Phoxillum BK4/2.5)  12.5 mL/kg/hr CRRT Continuous Jonna Rodas  mL/hr at 06/23/24 0326 12.5 mL/kg/hr at 06/23/24 0326    heparin (porcine) 20,000 units in 20 mL ANTICOAGULANT infusion (syringe from pharmacy)  500 Units/hr CRRT Continuous Hardy Tran APRN CNS 0.5 mL/hr at 06/23/24 1000 500 Units/hr at 06/23/24 1000    norepinephrine (LEVOPHED) 16 mg in  mL infusion MAX CONC CENTRAL LINE  0.01-0.6 mcg/kg/min (Dosing Weight) Intravenous Continuous Kajal Chong APRN CNP 4.8 mL/hr at 06/23/24 1000 0.1 mcg/kg/min at 06/23/24 1000    POST-filter replacement solution for CVVHD & CVVHDF (Phoxillum BK4/2.5)   CRRT Continuous Jonna Rodas  mL/hr at 06/21/24 1414 New Bag at 06/21/24 1414    PRE-filter replacement solution for CVVHD & CVVHDF (Phoxillum BK4/2.5)  12.5 mL/kg/hr CRRT Continuous Jonna Rodas  mL/hr at 06/23/24 0325 12.5 mL/kg/hr at 06/23/24 0325    vasopressin 1 unit/mL MAX Conc  (PITRESSIN) infusion  2.4 Units/hr Intravenous Continuous Awa Watt MD 2.4 mL/hr at 06/23/24 0800 2.4 Units/hr at 06/23/24 0800

## 2024-06-23 NOTE — PROGRESS NOTES
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     Changes today  - Vent settings adjusted with the goal to decrease ai hunger and barotrauma  - A-line replaced   - Afib episode overnight. If repeat epidose please administer 150mg Amio, no further dosing to avoid increases in volume  - CRRT with goal net negative -500ml.     PLAN:    Neuro:  # Pain   - Fentanyl gtt with boluses PRN  - Acetaminophen 325mg q4H    # Sedation  # Toxic metabolic encephalopathy, worsening  Presented with 2-3 days of lethargy, decreased appetite, and fatigue. Previously has improved with BiPAP/intubation. Ongoing lethargy in setting of sepsis and delirium in the setting of high-dose steroid therapy.   - Quetiapine 50mg BID and at bedtime PRN  - Precedex gtt, avoid increasing fentanyl for sedation   - RAAS goal 0 to -1    Pulmonary:  # Acute on chronic hypoxic hypercapnic respiratory failure  # Severe Pulmonary edema  # Acute respiratory distress syndrome, progressive  Presented to the ED on 6/18/2024 with SOB and hypercapnic respiratory failure. Workup significant for Stenotrophomonas pneumonia. Acute on chronic hypoxic hypercapnic respiratory failure likely secondary to pneumonia with exacerbation by pulmonary edema (diffuse bilateral opacities on CXR, uptrending patient weight 50kg to 57kg). On 6/21, oxygenation and lung pressures consistent with ARDS physiology (6/21 P/F ratio 206, elevated plateau pressures 30-34). On 6/22, progression of ARDS  (6/22 P/F ratio 150) with ongoing elevated plateau pressures. Will plan to continue antibiotics and aggressive pulmonary toilet, volume management (net even with CRRT I/O), and lung protective ventilation () to reduce risk of barotrauma while increasing PEEP (8) to maximize pulmonary recruitment.   - Antibiotics, as below  - Volume management with CRRT, as below  - Mechanical ventilation, wean as able   Hold daily SBT trial with ARDS physiology   Lung protective ventilation (tidal volume 320, PEEP 7)  - Pulmonary toilet   Mucomyst BID   Hypertonic saline BID, alternate with mucomyst   Albuterol neb QID  - Did not tolerate volera    Vent Mode: CMV/AC  (Continuous Mandatory Ventilation/ Assist Control)  FiO2 (%): 60 %  Resp Rate (Set): (S) 18 breaths/min  Tidal Volume (Set, mL): (S) 320 mL  PEEP (cm H2O): (S) 7 cmH2O  Resp: 25    # Healthcare-associated pneumonia, Stenotrophomonas and Enterococcus faecalis  # Positive bronchoscopy galactomannan  Presented to the ED on 6/18/2024 with SOB and hypercapnic respiratory failure. Found on bronchoscopy (6/19) to have RML obstruction by narrowed bronchus intermedius as well as copious mucopurulent secretions/mucus plugging. Bronchoscopy culture (6/19) is growing Stenotrophonomas and Enterococcus faecalis (sensitivities pending) and positive bronchoscopy galactomannan (6/19 1/87). Previously treated for Stenotrophonomas/aspergillus pneumonia in 11/2023 with subsequent sputum culture (2/2024) negative for both Stenotrophonomas/Aspergillus. Can consider repeat Stenotrophomonas infection vs opportunistic infection from colonized microbe. Lower suspicion for aspergillus pneumonia (has been on posaconazole chronically) but this is possible. Plan to continue antibiotics, pulmonary toilet. With worsening respiratory status, can consider broadening empiric antibiotics from Zosyn to Meropenem  - Transplant pulmonology consult, appreciate recs  - Transplant ID consult, appreciate  recs  - Antibiotics   S/p vancomycin (6/18-6/20)   S/p zosyn (6/18- 6/21)  PTA posaconazole 300mg every day (treatment dose)  Minocycline (6/20- present)    Meropenem (6/21- present)    Levofloxacin (6/21-present) per transplant ID       # Recurrent right middle bronchus stenosis s/p dilation and stent (6/20/2024)  Has history (bronchoscopy 2/15/24) demonstrating narrowing of right mainstem transplant anastomosis and bronchus intermedius. CT chest (6/18/2024) and bronchoscopy (6/19/2024) demonstrated occlusion of right middle bronchus. With concern for post-obstructive pneumonia, interventional pulmonology was consulted, and completed bronchoscopy (6/20/2024) with tissue debulking, right middle bronchus balloon dilation to 11 mm, stent placement in right main bronchus, and bifurcating cast placement in right upper bronchus. Plan for saline nebs BID and outpatient bronchoscopy in 6 weeks.   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 Per transplant, will stay at current does and reassess after rechecking levels tomorrow AM.  - Transplant pulm consult, appreciate recs  - PTA nebs               Fluticasone-viilanterol (breo ellipta) every day - HOLD with respiratory infection   Montelukast every day   - Immunosuppressants per Tx Pulm:                PTA Tacrolimus (dosing per tx pulm), recheck levels on 6/21               PTA Prednisone 5 mg daily - HOLD with stress dose steroids   PTA azathioprine - HOLD per Transplant pulmonology with repeat infections    Cardiovascular:  # Shock  On 6/19/2024, developed sepsis (hypotension 80s/50s, tachypnea 24) in the setting of stenotrophomonas pneumonia/enterococcus faecium VRE UTI. Etiology of shock likely distributive; less likely  hypovolemic (not pulling volumes with CRRT), medication-associated (weaned off of propofol gtt) or obstructive (low suspicion for PE, echo 6/19 without evidence of cardiac dysfunction). Maximum pressor needs 6/21 evening, with improvement with adjusted antibiotic therapy.   - IV pressors, wean as able    Norepi gtt   Vasopressin gtt  - MAP goal >65  - Stress dose steroids   Hydrocortisone 50mg q6H (6/21-present)   Fludrocortisone 0.1mg every day (6/21-present)    # Demand Ischemia, resolved    Presented with elevated troponin (peak 499). Not associated with chest pain or hypoxia. EKG without ST elevations/depressions or T wave inversions. Suspect demand ischemia in the setting of sepsis.   - Monitor symptoms  - Telemetry     # Hx Paroxysmal A-Fib  # Hx Pulmonary Hypertension (45 mmHg)  History of pulmonary hypertension (45 mmHg, echo 10/2023) in the setting of ILD and paroxysmal afib on PTA metoprolol tartrate BID. Not on anticoagulation for afib.   - PTA metoprolol tartrate 25mg BID - HOLD with soft pressures    GI/Nutrition:  # Nutrition  NJT placed (6/19/2024).   - Nutrition consult   Tube feeds 30ml/hr   Fiber 1 packet daily    # Diarrhea, resolved  On 6/21, had 8 bowel movements. Occurred in the setting of scheduled bowel regimen and starting new antibiotics (meropenem, levofloxacin). C diff negative. Will stop scheduled bowel regimen and start fiber.  - Bowel regimen   Miralax daily PRN   Senna daily PRN    # Elevated alk phos, resolved  Admission with elevated alkaline phosphatase (237) with elevated GGT consistent with hepatic etiology. Not associated with transaminitis. Etiology may be medication associated vs cholestatic. Now resolved.  - Monitor LFTs    Renal/Fluids/Electrolytes:  # ESRD on iHD (M,W,F)  History of ESRD 2/2 Tacrolimus toxicity on HD (MWF). With soft blood pressures, placed RIJ (6/20/2024) and started CRRT (6/20/2024).  - Nephrology consult   CRRT: aim for net negative 500ml.   -  Renally-dosed medications  - RN electrolyte replacement       Endocrine:  # Risk for hyperglycemia with stress-dose steroids  - HSSI     ID:  # Stenotrophomonas pneumonia  # Positive bronchoscopy galactomannan  Presented to the ED on 6/18/2024 with SOB and hypercapnic respiratory failure. Found on bronchoscopy (6/19) to have RML obstruction by narrowing bronchus intermedius as well as copious mucopurulent secretions/mucus plugging. Sputum culture (6/18) is growing Stenotrophonomas pneumonia. BAL cultures (6/19) pending. Positive bronchoscopy galactomannan (6/19 1.87) Previously treated for Stenotrophonomas/aspergillus pneumonia in 11/2023 with subsequent sputum culture (2/2024) negative for both Stenotrophonomas/Aspergillus. Can consider repeat Stenotrophomonas infection vs opportunistic infection from colonized microbe. Lower suspicion for aspergillus pneumonia (has been on posaconazole chronically) but this is possible. With worsening respiratory status and increasing pressor needs on 6/21, broadened antibiotics to Meropenem and added levofloxacin as second agent to target Stenotrophomonas (per transplant ID).   - Transplant pulmonology consult, appreciate recs  - Transplant ID consult, appreciate recs  - Antibiotics   S/p vancomycin (6/18-6/20)   S/p zosyn (6/18- 6/21)  PTA posaconazole 300mg every day (treatment dose)   Meropenem (6/21- present) in discussion with transplant ID    Levofloxacin (6/21-present) per transplant ID  Minocycline (6/20- present)      # Pyuria, Enterococcus faecium VRE and  ESBL E. Coli   Asymptomatic. With progressive IV pressor needs, obtained broad infectious workup which demonstrated UCx (6/19/2024) with Enterococcus faecium VR and ESBL E. Coli. Likely contributing for overall instability, for which we are treating. Also patient can be asymptomatic given immunosuppression.   - Antibiotics   Daptomycin (6/21 - present)                Meropenem (6/21 - present)    # Hx Aspergillus PNA  (11/2023)  # Hx PJP PNA (1/2021)  # Hx CMV viremia, recurrent  # Hx EBV viremia  - Transplant ID consult, appreciate recs  PTA posaconazole (Treatment for hx of aspergillus PNA)  PTA azithromycin 250mg qd (CLAD ppx)  PTA bactrim (PJP ppx)     Hematology:    # Acute on Chronic Normocytic Anemia  # Thrombocytopenia, chronic  History of chronic anemia in the setting of kidney disease (baseline Hgb 10-11). Upon admission, Hgb 9. May be bone marrow suppression in the setting of chronic illness; potential contribution by blood loss with CRRT filter changes (~150-200c). Peripheral smear (6/18/2024) without evidence of schistocytes.  - Monitor CBC    Musculoskeletal:  - PT / OT consult     Skin:  - No acute concerns.     General Cares/Prophylaxis:    DVT Prophylaxis: Heparin SQ  GI Prophylaxis: PPI  Restraints: None  Family Communication:  at bedside  Code Status: Full Code     Lines/tubes/drains:  - PIV x2, midline  - RIJ  - A line  - NGT  - ETT    Disposition:  - Medical ICU     Patient seen and findings/plan discussed with medical ICU staff, Dr. Mosquera.    Germán L. Vélez Reyes, MD, PhD  Internal Medicine Resident         Clinically Significant Risk Factors        # Hypokalemia: Lowest K = 3.2 mmol/L in last 2 days, will replace as needed       # Hypoalbuminemia: Lowest albumin = 2.2 g/dL at 6/21/2024  4:26 PM, will monitor as appropriate   # Thrombocytopenia: Lowest platelets = 84 in last 2 days, will monitor for bleeding                 #Precipitous drop in Hgb/Hct: Lowest Hgb this hospitalization: 7.8 g/dL. Will continue to monitor and treat/transfuse as appropriate.         # Financial/Environmental Concerns: none            ====================================  INTERVAL HISTORY:   Anxiety and Vent Dyssynchrony overnight, needing to start high dose Precedex, with noted improvement right after.  updated at bedside.     OBJECTIVE:   1. VITAL SIGNS:   Temp:  [98.4  F (36.9  C)-99.5  F (37.5  C)] 98.4  F  (36.9  C)  Pulse:  [] 122  Resp:  [25-38] 25  BP: ()/(25-82) 96/47  MAP:  [54 mmHg-109 mmHg] 78 mmHg  Arterial Line BP: ()/() 96/65  FiO2 (%):  [50 %-60 %] 60 %  SpO2:  [78 %-100 %] 98 %  Vent Mode: CMV/AC  (Continuous Mandatory Ventilation/ Assist Control)  FiO2 (%): 60 %  Resp Rate (Set): (S) 18 breaths/min  Tidal Volume (Set, mL): (S) 320 mL  PEEP (cm H2O): (S) 7 cmH2O  Resp: 25    2. INTAKE/ OUTPUT:   I/O last 3 completed shifts:  In: 2689.82 [I.V.:1284.82; Other:5; NG/GT:680]  Out: 1716 [Other:1716]    3. PHYSICAL EXAMINATION:  General: Laying in bed, NAD   HEENT: Atraumatic, moist mucous membranes   Pulm/Resp: Coarse breath sounds allover. Good air movement throughout.   CV: RRR, no m/r/g   Abdomen: Soft, non-distended, non-tender  Ext: No edema, pulses 2+ radial, pedal  Incisions/Skin: No rashes or lesions  Neuro: Sedated, arouses to voice, follows commands, moving all extremities    4. LABS:   Arterial Blood Gases   Recent Labs   Lab 06/23/24  0906 06/23/24  0640 06/23/24  0414 06/22/24  2223   PH 7.32* 7.37 7.28* 7.32*   PCO2 47* 43 53* 47*   PO2 63* 80 81 66*   HCO3 24 25 25 24     Complete Blood Count   Recent Labs   Lab 06/23/24  0414 06/22/24  1615 06/22/24  0439 06/22/24  0034   WBC 5.4 6.0 6.0 6.4   HGB 7.8* 8.0* 8.1* 8.4*   PLT 84* 89* 95* 84*     Basic Metabolic Panel  Recent Labs   Lab 06/23/24  1141 06/23/24  0745 06/23/24  0414 06/22/24  1614 06/22/24  1613 06/22/24  0441 06/22/24  0439 06/22/24  0041 06/22/24  0039   NA  --   --  139  --  139  --  137  137  --  136   POTASSIUM  --   --  4.2  --  3.9  --  3.9  3.9  --  3.2*   CHLORIDE  --   --  109*  --  109*  --  106  106  --  106   CO2  --   --  22  --  21*  --  21*  21*  --  21*   BUN  --   --  27.0*  --  28.0*  --  29.0*  29.0*  --  31.7*   CR  --   --  1.15*  --  1.39*  --  1.67*  1.67*  --  1.85*   * 147* 172*  167*   < > 185*   < > 180*  180*   < > 217*    < > = values in this interval not displayed.      Liver Function Tests  Recent Labs   Lab 06/23/24  0414 06/22/24  1613 06/22/24  0439 06/22/24  0039 06/21/24  1626 06/21/24  0356 06/20/24  1506 06/20/24  0420 06/18/24  1803 06/18/24  1418   AST 17  --  11  --   --  12  --  26   < >  --    ALT 20  --  17  --   --  20  --  23   < > 39   ALKPHOS 78  --  89  --   --  123  --  153*   < > 237*   BILITOTAL 0.8  --  1.0  --   --  1.1  --  0.9   < > 0.6   ALBUMIN 2.4* 2.3* 2.3*  2.3* 2.3*   < > 2.4*  2.4*   < > 2.7*   < > 3.6   INR  --   --   --   --   --   --   --   --   --  0.98    < > = values in this interval not displayed.     Coagulation Profile  Recent Labs   Lab 06/18/24  1418   INR 0.98       5. RADIOLOGY:   Recent Results (from the past 24 hour(s))   XR Chest Port 1 View    Narrative    Exam: XR CHEST PORT 1 VIEW, 6/23/2024 1:58 AM    Comparison: 6/22/2024    History: worsening oxygenation    Findings:  Portable AP view of the chest. Median sternotomy wires. Stable support  devices. Right bronchial stent. Increased opacification of the left  hemithorax. Right basilar patchy mixed opacities. No pneumothorax.       Impression    Impression:   1. Mildly increased opacification of the left hemithorax, suggestive  of diffuse pulmonary edema versus infection. Stable right basilar  mixed opacities.   2. Stable support devices.    I have personally reviewed the examination and initial interpretation  and I agree with the findings.    CARMELINA VILLALOBOS MD         SYSTEM ID:  [id number]

## 2024-06-23 NOTE — PLAN OF CARE
ICU End of Shift Summary. See flowsheets for vital signs and detailed assessment.    Changes this shift: Calm,restless or sedated. Patient is between a +1 to -2. When restless, patient desat to 78-80%, -140's, RR 30's, pulling lines and reaches for tube, mitts on when family is not around. Precedex 1.2/hr and Fentanyl 50/hr. When sedated, RASS -2, calm, HR 's, RR 25. Brief moments when awake and calm. MAP goal >65, Vaso at 2.4, with new ART line, able to wean Levo off. O2 sat goal >92, last ABG, no changes in vent per team, wean FiO2 down to 45%, peep 7. CRRT, able to pull, per team, would like -500ml/24 hrs, pt currently <<>>ml. Skin slightly better due to stopped diarrhea, consider starting stool softeners tomorrow if no stool. Labs stable. Family at bedside, supportive and at times, tearful.     Plan: Monitor vent and wean FiO2 as tolerated. CRRT, pull to meet goal if possible. Wean pressors as tolerated. Continue with POC.     Goal Outcome Evaluation:      Plan of Care Reviewed With: patient    Overall Patient Progress: no changeOverall Patient Progress: no change    Outcome Evaluation: Decreased pressor requirement with new ART line. CRRT able to pull -50ml/24hr. Restless and sedated. Continue with POC.

## 2024-06-23 NOTE — PROGRESS NOTES
CRRT STATUS NOTE    DATA:  Time:  6:00 AM  Pressures WNL:  YES  Filter Status:  WDL    Problems Reported/Alarms Noted:  None.    Supplies Present:  YES    ASSESSMENT:  Patient Net Fluid Balance:  Positive 2.1L @ midnight, net +167 ml @ 0600.  Vital Signs:  , BP 91/59 (70), SpO2 94%  Labs:  K 4.2, Mg 2.3, Phos 3.5, iCa 4.9, Hgb 7.8, Plt 84  Goals of Therapy:  I = O as able    INTERVENTIONS:   None.    PLAN:  Continue to monitor circuit daily and change set q72 hours or PRN for clotting/clogging. Please call CRRT RN with any quesitons/problems.

## 2024-06-23 NOTE — PROGRESS NOTES
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  The patient was admitted on 24 for progressive hypoxia with dyspnea and worsening hypercapnia in ED requiring intubation likely d/t post-obstructive PNA 2/2 right bronchus stenosis.  Bronch confirming severe stenosis of right anastomosis with extensive RLL plugging.  IP bronch () with laser tissue debulking RMB stenosis, airway balloon dilation of RMB and BI, and placement of stent RMB to BI and bifurcating stent in RUL.  Remains intubated.  Transitioned to CRRT . Increased agitation/delirium on  with increasing desaturation led to need for increased sedation.        Today's recommendations:  - Continue to hold tacrolimus today, level ordered for tomorrow   - Following pending cultures and ID work up  - Agree with fluid removal as able per MICU  - ABX per transplant ID  - Repeat prospera  (pending)  - Immuknow very low at 87, but currently on 2 drug may be related to increased hydrocort doses, will repeat in 2-4 weeks pending clinical recovery  - Resume chronic azithromycin  - Tacrolimus level 11.1, will resume at 0.4 mg bid with daily levels  - Chronic prednisone held while on stress dose steroids  - Continue PTA posaconazole  - CMV weekly monitoring  (ordered 6/25) with stress dose steroids, revisit need for VGCV ppx if prolonged duration of steroids (>3 days) per Dr. James     Acute on chronic hypoxic/hypercapneic respiratory failure:  Post-obstructive multi-lobular PNA:  Right bronchial stenosis with RML collapse: Admitted with 2 weeks of progressive hypoxia, dyspnea, congested cough, and fatigue.  Chronic hypoxia with 2L NC, increased from 3 to 4L over this time with acute decompensation on day of admission associated with hypercapnia.  VBG 7/17/85 in ED s/p intubation.  Most recent IP bronch 2/15 s/p tissue debulking without stent placement.  Respiratory panel, COVID, and MRSA nares negative.  Additional negative ID workup: Legionella, Strep pneumoniae, Histo, CrAg, fungitell, and A. galactomannan (blood).  IgG WNL.  Procal mildly elevated at 0.24 initially (then elevated to 1.77 6/20), LA normal, but febrile to 100.7.  TTE on admission grossly normal.  CT with RUL/RLL consolidative opacities, RML collapse, and narrowing of BI and distal RLL bronchus.  Started on norepi 6/19.  Bronch per MICU (6/19) with severe stenosis starting at right anastomosis, inspection with smaller scope revealing for extensive RLL mucous plugging.  Bronch per IP (6/20) with laser tissue debulking RMB stenosis, airway balloon dilation of RMB and BI, and placement of stent RMB to BI and bifurcating stent in RUL.  Low grade fevers persisting, remains on full vent support.    - Sputum culture (6/18) with Stenotrophomonas x2  - RLL bronch wash (6/19) with E faecalis and steno  - Blood cultures (6/18) NGTD  - Urine Cx: VRE  - Additional ID workup pending: PJP, Cocci, and A. galactomannan (BAL)  - ABX: Mereopenem (6/20-), Daptomycin (6/21-), Levofloxacin (6/21-), minocycline (6/20, Steno), Zosyn (6/18-6/21) per transplant ID; s/p IV azithromycin 500 mg daily (6/18-6/20) and IV vancomycin (6/18)   - Nebs: albuterol QID, Mucomyst 10% BID alternating with 3% HTS as of 6/22,  Volera stopped  6/20 with stent placement, defer consideration of airway clearance to IP and MICU team.   - Repeat bronch in 6 weeks per IP, sooner if indicated  - Vent management per MICU  - Stress dose steroids per MICU     S/p bilateral sequential lung transplant (BSLT) for ILD:   Progressive CLAD: Most recent OP visit in February.  DSA negative 6/19 (last positive noted 2018).  - Repeat Prospera (6/19) pending, may be artificially elevated with current infection, but notably high at 2.42 on 2/14  - Repeat ImmuKnow (6/19) 87, very low likely hydrocort related, currently only on 2 drug  - PTA Singulair, azithromycin (resume chronic dose), and Breo inhaler as able (resume once extubated)     Immunosuppression:  On 2-drug IST since November d/t leukopenia and recurrent infections  - Tacrolimus 0.6 mg qAM / 0.7 mg qPM.  Goal level 8-10. Level came back at 11.1 after holding 4 doses, will resume 6/23 pm at 0.4 mg bid with daily levels (ordered)  - Prednisone 5 mg daily --> held with stress dose steroids as above, would resume 5 mg daily if hydrocort approaches 20 mg daily or less.      Prophylaxis:   - Bactrim daily for PJP ppx (increased from prior qMWF on 6/20 given CRRT, monitor need to adjust pending renal plan)     ID: H/o Actinomyces (10/17/23) and recurrent Steno (8/17/23 and 11/1/23).  - ABX and infectious work up as above     Aspergillus ochraceus:  H/o Aspergillus empyema:  S/p empyema drainage and ampho B instillation.  Previously on voriconazole, transitioned to posaconazole and managed by transplant ID as OP with last visit 3/13.  Calcified fungal elements remain with additional recurrent effusion (likely associated with fluid disturbances r/t iHD), but surgical intervention previously deferred d/t risk.  Posaconazole level 2.5 on 6/19.  - Posaconazole 300 mg daily     H/o CMV viremia: Recurrent CMV viremia historically.  VGCV ppx most recently stopped 4/12.  Most recent CMV low positive at 46 (6/12) and <35 (6/18).  -  CMV weekly monitoring (ordered 6/25) with stress dose steroids, revisit need for VGCV ppx if prolonged duration of steroids (>3 days)      EBV viremia: S/p rituximab 12/13/23 x1 dose.  Most recent EBV negative 6/18.     ESRD on iHD: Kidney evaluation started as OP.  On qMWF iHD schedule, no missed sessions.  Transitioned to CRRT on 6/20, management per renal and MICU. Attempting to remove fluid which is likely contributing to some hypoxia at this point.     Positive urine culture: Noted 6/19, >100k GNB and VRE faecium, ABX as above with management per MICU team.  - Daptomycin per transplant ID    We appreciate the excellent care provided by the MICU team.  Recommendations communicated via in person rounding and this note.  Will continue to follow along closely, please do not hesitate to call with any questions or concerns.    Yenni Graham MD  Pulmonary Transplant/CF Attending  Pager: [phone]  Vocera Web Console          Subjective & Interval History:   On full vent support with increasing FiO2 with dyssynchrony and agitation leading to an increase in sedation. PIPs in mid 20s today. Net positive 2L yesterday. Pressor requirements seem to be improving, down from vaso 4 to 2.4. FiO2 now down to 50%. Technically afebrile but Tmax of 99.5 on CRRT so is probably febrile with CRRT masking.     Review of Systems:     ROS limited due to intubation and sedation    C: + increased weight  INTEGUMENTARY/SKIN: No rash or obvious new lesions  ENT/MOUTH: No nasal drainage  RESP: See interval history  CV: + peripheral edema  GI: No nausea, no vomiting, no change in stools  : + anuric  MUSCULOSKELETAL: No myalgias, no arthralgias  ENDOCRINE: Blood sugars with adequate control  NEURO: No headache  PSYCHIATRIC: unable to be assessed    Physical Exam:     All notes, images, and labs from past 24 hours (at minimum) were reviewed.    Vital signs:  Temp: 99.5  F (37.5  C) Temp src: Axillary BP: 128/44 Pulse: 89   Resp: 25 SpO2: 94 %  "O2 Device: Mechanical Ventilator Oxygen Delivery: 6 LPM Height: 160 cm (5' 3\") Weight: 57.7 kg (127 lb 3.3 oz)  I/O:       Intake/Output Summary (Last 24 hours) at 6/23/2024 1029  Last data filed at 6/23/2024 1000  Gross per 24 hour   Intake 2657.8 ml   Output 2230 ml   Net 427.8 ml             Constitutional: Lying in bed, eyes open   HEENT: Eyes with pink conjunctivae, anicteric.  Orally intubated.  Nasal FT.  PULM: Mildly diminished air flow to bases bilaterally.  No rhonchi, no wheezes.  Non-labored breathing on full vent support.  CV: Normal S1 and S2.  RRR.  No murmur, gallop, or rub.  + 1 BLE edema.  ABD: NABS, soft, nontender, nondistended.    MSK: No apparent muscle wasting.   NEURO: Opens eyes to voice, awake, alert, delirious,   SKIN: Warm dry.    PSYCH: agitated    Data:     LABS    CMP:   Recent Labs   Lab 06/23/24  0745 06/23/24  0414 06/23/24  0019 06/22/24  1614 06/22/24  1613 06/22/24  0441 06/22/24  0439 06/22/24  0041 06/22/24  0039 06/21/24  1638 06/21/24  1626 06/21/24  1208 06/21/24  0356 06/20/24  0733 06/20/24  0420   NA  --  139  --   --  139  --  137  137  --  136  --  135  --  134*  134*   < > 133*   POTASSIUM  --  4.2  --   --  3.9  --  3.9  3.9  --  3.2*  --  3.5  --  3.6  3.6   < > 3.7   CHLORIDE  --  109*  --   --  109*  --  106  106  --  106  --  104  --  103  103   < > 99   CO2  --  22  --   --  21*  --  21*  21*  --  21*  --  21*  --  20*  20*   < > 24   ANIONGAP  --  8  --   --  9  --  10  10  --  9  --  10  --  11  11   < > 10   * 172*  167* 179*   < > 185*   < > 180*  180*   < > 217*   < > 225*   < > 158*  155*  155*   < > 131*   BUN  --  27.0*  --   --  28.0*  --  29.0*  29.0*  --  31.7*  --  33.0*  --  32.3*  32.3*   < > 34.5*   CR  --  1.15*  --   --  1.39*  --  1.67*  1.67*  --  1.85*  --  2.30*  --  2.72*  2.72*   < > 4.27*   GFRESTIMATED  --  54*  --   --  43*  --  34*  34*  --  30*  --  23*  --  19*  19*   < > 11*   CHELSEY  --  8.0*  --   --  7.9*  " "--  8.0*  8.0*  --  7.9*  --  7.7*  --  7.9*  7.9*   < > 7.9*   MAG  --  2.3  --   --  2.4*  --  2.3  --  2.2  --  2.2  --  2.2   < > 1.5*   PHOS  --  3.5  --   --  2.4*  --  2.5  --   --   --  3.0  --  2.6   < > 1.9*   PROTTOTAL  --  4.7*  --   --   --   --  4.6*  --   --   --   --   --  4.7*  --  5.0*   ALBUMIN  --  2.4*  --   --  2.3*  --  2.3*  2.3*  --  2.3*  --  2.2*  --  2.4*  2.4*   < > 2.7*   BILITOTAL  --  0.8  --   --   --   --  1.0  --   --   --   --   --  1.1  --  0.9   ALKPHOS  --  78  --   --   --   --  89  --   --   --   --   --  123  --  153*   AST  --  17  --   --   --   --  11  --   --   --   --   --  12  --  26   ALT  --  20  --   --   --   --  17  --   --   --   --   --  20  --  23    < > = values in this interval not displayed.     CBC:   Recent Labs   Lab 06/23/24  0414 06/22/24  1615 06/22/24  0439 06/22/24  0034   WBC 5.4 6.0 6.0 6.4   RBC 2.49* 2.58* 2.62* 2.76*   HGB 7.8* 8.0* 8.1* 8.4*   HCT 24.9* 25.3* 25.9* 27.2*    98 99 99   MCH 31.3 31.0 30.9 30.4   MCHC 31.3* 31.6 31.3* 30.9*   RDW 15.1* 15.0 15.0 15.2*   PLT 84* 89* 95* 84*       INR:   Recent Labs   Lab 06/18/24  1418   INR 0.98       Glucose:   Recent Labs   Lab 06/23/24  0745 06/23/24  0414 06/23/24  0019 06/22/24 2024 06/22/24  1614   * 172*  167* 179* 170* 166*       Blood Gas:   Recent Labs   Lab 06/23/24  0640 06/23/24  0414 06/22/24  2223 06/21/24  0817 06/19/24  2359 06/19/24  0614 06/18/24  2348   PHV  --   --   --   --  7.40 7.45* 7.41   PCO2V  --   --   --   --  43 37* 43   PO2V  --   --   --   --  46 43 43   HCO3V  --   --   --   --  26 26 27   LASHAUN  --   --   --   --  1.3 1.9 1.9   O2PER 60 60 60   < > 30 45 40    < > = values in this interval not displayed.       Culture Data No results for input(s): \"CULT\" in the last 168 hours.    Virology Data:   Lab Results   Component Value Date    FLUAH1 Not Detected 06/18/2024    FLUAH3 Not Detected 06/18/2024    [id number] Not Detected 06/18/2024    IFLUB Not " Detected 2024    RSVA Not Detected 2024    RSVB Not Detected 2024    PIV1 Not Detected 2024    PIV2 Not Detected 2024    PIV3 Not Detected 2024    HMPV Not Detected 2024    HRVS Negative 2021    ADVBE Negative 2021    ADVC Negative 2021    ADVC Negative 2020    ADVC Negative 10/07/2019       Historical CMV results (last 3 of prior testing):  Lab Results   Component Value Date    CMVQNT <35 (A) 2024    CMVQNT Not Detected 2024    CMVQNT Not Detected 2023     Lab Results   Component Value Date    CMVLOG <1.5 2024    CMVLOG 2.5 2024    CMVLOG <1.5 10/31/2023       Urine Studies    Recent Labs   Lab Test 24  1214 21  1729   URINEPH 6.5 5.0   NITRITE Negative Negative   LEUKEST Large* Moderate*   WBCU >182* 34*       Most Recent [place] Pulmonary Function Testing (FVC/FEV1 only)  FVC-Pre   Date Value Ref Range Status   2024 0.85 L    2023 1.04 L    2023 0.85 L    10/24/2023 0.96 L      FVC-%Pred-Pre   Date Value Ref Range Status   2024 29 %    2023 36 %    2023 29 %    10/24/2023 33 %      FEV1-Pre   Date Value Ref Range Status   2024 0.80 L    2023 0.89 L    2023 0.78 L    10/24/2023 0.87 L      FEV1-%Pred-Pre   Date Value Ref Range Status   2024 34 %    2023 38 %    2023 33 %    10/24/2023 37 %        IMAGING    Recent Results (from the past 48 hour(s))   Echo Complete   Result Value    LVEF  55-60%    Narrative    [id number]  [id number]  [id number]  [id number]^VELEZ REYES^[facility],[place]  Echocardiography Laboratory  [address] [place] [id number]     Name: SARAI KILLIAN  MRN: [id number]  : 1960  Study Date: 2024 08:11 AM  Age: 63 yrs  Gender: Female  Patient Location: [facility] [place]  Reason For Study: Cardiomyopathy  Ordering Physician: VELEZ REYES, GERMAN  Performed By: Patricia Mandujano,  RDCS     BSA: 1.5 m2  Height: 63 in  Weight: 112 lb  HR: 80  BP: 94/45 mmHg  ______________________________________________________________________________  Procedure  Complete Portable Echo Adult.  ______________________________________________________________________________  Interpretation Summary  Global and regional left ventricular function is normal with an EF of 55-60%.  Right ventricular function, chamber size, wall motion, and thickness are  normal.  The inferior vena cava cannot be assessed.  No pericardial effusion is present.  No significant valvular abnormalities present.  ______________________________________________________________________________  Left Ventricle  Global and regional left ventricular function is normal with an EF of 55-60%.  Left ventricular wall thickness is normal. Left ventricular size is normal.  Left ventricular diastolic function is normal. No regional wall motion  abnormalities are seen.     Right Ventricle  Right ventricular function, chamber size, wall motion, and thickness are  normal.     Atria  Both atria appear normal.     Mitral Valve  The mitral valve is normal. Trace mitral insufficiency is present.     Aortic Valve  The valve leaflets are not well visualized. On Doppler interrogation, there is  no significant stenosis or regurgitation.     Tricuspid Valve  Mild tricuspid insufficiency is present. The right ventricular systolic  pressure is approximated at 35.5 mmHg plus the right atrial pressure.     Pulmonic Valve  The pulmonic valve is normal.     Vessels  The thoracic aorta is normal. The pulmonary artery is normal. The inferior  vena cava cannot be assessed.     Pericardium  No pericardial effusion is present.     Miscellaneous  No significant valvular abnormalities present.  ______________________________________________________________________________  MMode/2D Measurements & Calculations     IVSd: 1.2 cm  LVIDd: 4.0 cm  LVIDs: 2.8 cm  LVPWd: 1.1 cm  FS: 29.9  %  LV mass(C)d: 161.0 grams  LV mass(C)dI: 106.5 grams/m2  Ao root diam: 3.2 cm  asc Aorta Diam: 3.0 cm  LVOT diam: 2.0 cm  LVOT area: 3.3 cm2  Ao root diam index Ht(cm/m): 2.0  Ao root diam index BSA (cm/m2): 2.1  Asc Ao diam index BSA (cm/m2): 2.0  Asc Ao diam index Ht(cm/m): 1.9  RWT: 0.57  TAPSE: 1.3 cm     Doppler Measurements & Calculations  MV E max herberth: 82.3 cm/sec  MV A max herberth: 85.9 cm/sec  MV E/A: 0.96     MV dec slope: 271.7 cm/sec2  MV dec time: 0.30 sec  PA acc time: 0.10 sec  TR max herberth: 298.0 cm/sec  TR max P.5 mmHg  E/E' avg: 15.7  Lateral E/e': 19.4  Medial E/e': 12.0     ______________________________________________________________________________  Report approved by: Glen Zhang 2024 09:29 AM         XR Abdomen Port 1 View    Narrative    EXAMINATION: US ABDOMEN COMPLETE,  2024 12:06 PM     COMPARISON: 2024     HISTORY: Verify small bowel feeding tube bedside placement    FINDINGS: Feeding tube tip in the inferior duodenum. No air-filled  dilated loops of bowel. Basilar atelectasis and consolidation in the  lungs.      Impression    IMPRESSION: Feeding tube in the third portion of the duodenum.     JAMES LAI MD         SYSTEM ID:  [id number]   XR Chest Port 1 View    Narrative    Exam: XR CHEST PORT 1 VIEW, 2024 6:49 AM    Comparison: 2024    History: s/p BSLT, AHRF in setting of RLL pneumonia    Findings:  Portable AP view of the chest. Endotracheal tube in the thoracic  trachea 2.3 cm above the gee. Partially imaged enteric tube.  Cardiomegaly with bilateral  basilar predominant opacities, mildly  increased in the upper lobes. No pneumothorax. Unchanged sternotomy  wires and surgical clips.      Impression    Impression:   Bilateral pulmonary opacities, mildly increased in the upper lobes  suggesting increased edema and underlying infection.   The endotracheal tube projects 2.3 cm above the gee.    I have personally reviewed the examination and  initial interpretation  and I agree with the findings.    REJI VIVAS DO         SYSTEM ID:  [id number]   XR Chest Port 1 View    Narrative    EXAM: XR CHEST PORT 1 VIEW 6/20/2024 10:51 AM    INDICATION: CVC placement    COMPARISON: Same-day chest radiograph 06:21    TECHNIQUE: Single portable semiupright AP view of the chest.    FINDINGS:   Interval placement of right IJ central venous catheter with tip  projecting over the cavoatrial junction. Endotracheal tube tip 3.3 cm  above the gee. Enteric tube coursing below left hemidiaphragm.  Right upper extremity midline catheter tip projecting over the axilla.  Postsurgical changes of bilateral lung transplant with intact  sternotomy wires and mediastinal clips. Similar diaphragm silhouetting  and slightly improved patchy bibasilar and perihilar opacities. No  evidence of pneumothorax.      Impression    IMPRESSION:   1. Interval placement of right IJ central venous catheter with tip  projecting over the cavoatrial junction. Slight retraction of the ET  tube to 3.3 cm above the gee, otherwise stable support devices.  2. Stable bilateral pleural effusions with slightly improved bibasilar  atelectasis versus improved patient positioning. Persistent perihilar  patchy opacities.    I have personally reviewed the examination and initial interpretation  and I agree with the findings.    ADAM GONZALEZ MD         SYSTEM ID:  [id number]   XR Surgery SHANNAN L/T 5 Min Fluoro w Stills    Narrative    This exam was marked as non-reportable because it will not be read by a   radiologist or a [place] non-radiologist provider.         XR Chest Port 1 View    Narrative    EXAM: XR CHEST PORT 1 VIEW 6/20/2024 2:35 PM    INDICATION: airway stenting    COMPARISON: Same day chest radiograph 10:42    TECHNIQUE: Single portable semiupright AP view of the chest.    FINDINGS:   Interval placement of right main stem bronchial stent. Other unchanged  support devices including right upper  shotty midline tip in the  axilla, right IJ central venous catheter at the cavoatrial junction,  endotracheal tube 2.6 cm above the gee, enteric tube coursing below  left hemidiaphragm.    Postsurgical changes of bilateral lung transplant with midline  sternotomy wires intact. Unchanged mixed interstitial and airspace  opacities, slightly increased compared to prior. Stable cardiomegaly.  No pneumothorax.      Impression    IMPRESSION:   Interval placement of right mainstem bronchial stent, otherwise  unchanged support devices. Slightly increased mixed airspace and  interstitial opacities suggesting possible underlying infection with  concomitant pulmonary edema.    I have personally reviewed the examination and initial interpretation  and I agree with the findings.    ADAM GONZALEZ MD         SYSTEM ID:  [id number]

## 2024-06-23 NOTE — PROGRESS NOTES
CRRT STATUS NOTE    DATA:  Time:  6:16 PM  Pressures WNL:  NO  Filter Status:  WDL    Problems Reported/Alarms Noted: none    Supplies Present:  YES    ASSESSMENT:  Patient Net Fluid Balance:    Vital Signs:  Temp: 99.6  F (37.6  C) Temp src: Axillary BP: (!) 77/46 Pulse: 108   Resp: 28 SpO2: 97 % O2 Device: Mechanical Ventilator      Labs:  Last Comprehensive Metabolic Panel:  Sodium   Date Value Ref Range Status   06/23/2024 137 135 - 145 mmol/L Final     Comment:     Reference intervals for this test were updated on 09/26/2023 to more accurately reflect our healthy population. There may be differences in the flagging of prior results with similar values performed with this method. Interpretation of those prior results can be made in the context of the updated reference intervals.    05/07/2021 136 133 - 144 mmol/L Final     Potassium   Date Value Ref Range Status   06/23/2024 4.2 3.4 - 5.3 mmol/L Final   05/26/2022 3.5 3.4 - 5.3 mmol/L Final   05/07/2021 4.3 3.4 - 5.3 mmol/L Final     Potassium POCT   Date Value Ref Range Status   10/17/2023 3.8 3.5 - 5.0 mmol/L Final     Chloride   Date Value Ref Range Status   06/23/2024 107 98 - 107 mmol/L Final   05/07/2021 102 94 - 109 mmol/L Final     Chloride (External)   Date Value Ref Range Status   06/17/2024 101 98 - 107 mmol/L Final     Carbon Dioxide   Date Value Ref Range Status   05/07/2021 23 20 - 32 mmol/L Final     Carbon Dioxide (CO2)   Date Value Ref Range Status   06/23/2024 23 22 - 29 mmol/L Final   05/26/2022 32 20 - 32 mmol/L Final     Anion Gap   Date Value Ref Range Status   06/23/2024 7 7 - 15 mmol/L Final   05/26/2022 9 3 - 14 mmol/L Final   05/07/2021 10 3 - 14 mmol/L Final     Glucose   Date Value Ref Range Status   06/23/2024 202 (H) 70 - 99 mg/dL Final   05/26/2022 110 (H) 70 - 99 mg/dL Final   05/07/2021 125 (H) 70 - 99 mg/dL Final     GLUCOSE BY METER POCT   Date Value Ref Range Status   06/23/2024 162 (H) 70 - 99 mg/dL Final     Urea Nitrogen  "  Date Value Ref Range Status   06/23/2024 29.7 (H) 8.0 - 23.0 mg/dL Final   05/26/2022 42 (H) 7 - 30 mg/dL Final   05/07/2021 66 (H) 7 - 30 mg/dL Final     Creatinine   Date Value Ref Range Status   06/23/2024 1.04 (H) 0.51 - 0.95 mg/dL Final   05/07/2021 3.90 (H) 0.52 - 1.04 mg/dL Final     GFR Estimate   Date Value Ref Range Status   06/23/2024 61 >60 mL/min/1.73m2 Final     Comment:     eGFR calculated using 2021 CKD-EPI equation.   05/07/2021 12 (L) >60 mL/min/[1.73_m2] Final     Comment:     Non  GFR Calc  Starting 12/18/2018, serum creatinine based estimated GFR (eGFR) will be   calculated using the Chronic Kidney Disease Epidemiology Collaboration   (CKD-EPI) equation.       Calcium   Date Value Ref Range Status   06/23/2024 8.1 (L) 8.8 - 10.2 mg/dL Final   05/07/2021 8.1 (L) 8.5 - 10.1 mg/dL Final      Lab Results   Component Value Date    WBC 5.4 06/23/2024    WBC 7.5 05/02/2021     Lab Results   Component Value Date    RBC 2.49 06/23/2024    RBC 3.47 05/02/2021     Lab Results   Component Value Date    HGB 7.8 06/23/2024    HGB 10.6 05/02/2021     Lab Results   Component Value Date    HCT 24.9 06/23/2024    HCT 33.8 05/02/2021     No components found for: \"MCT\"  Lab Results   Component Value Date     06/23/2024    MCV 97 05/02/2021     Lab Results   Component Value Date    MCH 31.3 06/23/2024    MCH 30.5 05/02/2021     Lab Results   Component Value Date    MCHC 31.3 06/23/2024    MCHC 31.4 05/02/2021     Lab Results   Component Value Date    RDW 15.1 06/23/2024    RDW 13.9 05/02/2021     Lab Results   Component Value Date    PLT 84 06/23/2024     05/02/2021        Goals of Therapy:  I=O as able    INTERVENTIONS:   none    PLAN:  Continue fluid removal per goals of care as patient tolerates. Check filter daily. Change filter q72 hours and PRN. Please call CRRT resource RN @ [phone] with any questions or concerns.       "

## 2024-06-23 NOTE — PROGRESS NOTES
[facility]    Transplant Infectious Diseases Inpatient Progress note      Kecia Blue MRN# [id number]   YOB: 1960 Age: 63 year old   Date of Admission: 6/18/2024  1:47 PM  Transplant: 3/1/2018 (Lung), Postoperative day: 2306            Recommendations:   Discontinue levofloxacin, as her Stenotrophomonas is resistant.   Continue IV minocycline 100 mg q12 hrs for Stenotrophomonas coverage   Continue renally dosed/CRRT IV meropenem, given cefepime resistant E coli seen in urine.   Note: This will also cover VSE faecalis from BAL cultures (although E faecalis is very unlikely to be a true respiratory pathogen)   Continue IV daptomycin with dose increased to 12 mg/kg/day for VRE faecium coverage in urine (done for you)  VRE is susceptible dose dependent (JAYME = 4)  Baseline CK 23 (6/21/24)  Dose adjust per pharmacy while on CRRT/per renal function  Have sent aspergillus galactomannan for tomorrow, given elevated BAL galactomannan (1.37) on 6/21.   For now, continue posaconazole 300 mg/day. Will discuss with pulm tomorrow whether a transition back to voriconazole is warranted with the elevated galactomannan. However, she had issues with tac levels in the past, so this decision will need to be made thoughtfully.   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         Active Problems and Infectious Diseases Issues:     Acute hypercapnic and hypoxic respiratory failure, intubated 6/18/24  Multilobar pneumonia, right  Right mainstem bronchial stenosis requiring dilations (dilated 2/15/24, s/p 6/20 dilation with stent placement)  History of Stenotrophomonas colonization (ceftazidime R, levo mixed susceptibilities; susceptible to Bactrim and minocycline)  On chronic 3 L oxygen at home. Opacities in lung since Oct 2023 (along with PFTs) in the setting of RMB stenosis and post obstructive pneumonia. Completed 2 months amoxicillin (03-05/2024) for the actinomyces that grew in Oct BAL without any significant clinical change. Recent sinus congestion suggests likely recent viral illness +  also sick, followed by increased secretions, weak cough, and hypercapnic respiratory failure concerning for pneumonia (likely bacterial vs post-obstructive) vs mucus plug. Viral workup (RVP, covid/flu/RSV) negative here. CT chest 6/18/24 with known right mainstem anastomosis and bronchus intermedius stenosis (last dilation 2/15/24) with new narrowing of R BI and RML occlusion, narrowing of distal RLL bronchus, and RLL consolidations. On vancomycin, zosyn, and azithromycin in ED. Vancomycin stopped 6/19 with negative MRSA nares. Lower suspicion for fungal or PJP etiology as she remains on home posaconazole and Bactrim ppx. Negative Fungitell and AGM. Bronchoscopy with BAL 6/19 notable for stenosis of R mainstem and proximal BI with RLL mucoid plugs removed, cultures pending with GNB. Had IP bronchoscopy intervention 6/20 - laser tissue debulking, dilation x2 (RMB and BI) and 1 stent placed. Sputum culture with Stenotrophomonas (known colonizer for her) and E faecalis. Covering  Stenotrophomonas since 6/20 with minocycline with levofloxacin added on 6/21/24 given ongoing pressor requirements + purulent secretions. Pip/tazo--> meropenem on 6/21, given increasing pressor and oxygen/vent requirements - postobstructive PNA vs pulmonary edema vs ARDS. Stenotrophomonas now found to be resistant to levofloxacin.   - Continue meropenem, given cefepime resistant E coli from urine Note this will also cover E faecalis from BAL (although this is very unlikely to cause invasive disease).   - Discontinue levofloxacin, since her stenotrophomonas is resistant   - Continue minocycline for Stenotrophomonas   - Follow up pending workup: blood culture x2, sputum and BAL cultures     Pyuria, Urine culture with VRE and cefepime resistant E coli  Patient with ESRD on HD, makes very little urine. Straight cath for urinary specimens (Strep/Legionella antigens - negative), UA - abnormal with pyuria, and Ucx now with GNB and VRE faecium. No prior history of UTI. Does report some dysuria today but this is after straight cath. No abdominal/suprapubic tenderness, frequency, urgency. Difficult to interpret if true UTI vs asymptomatic bacteruria (ASB) in patient's with ESRD/HD. Likely this is ASB/colonization and broad spectrum antimicrobial use and residual small volume urine has selected for VRE. Given pressor needs, agree to VRE coverage for now though feel lungs are primarily driving her shock and pressor requirements. Daptomycin does not penetrate lungs, though no resistant gram positive has been isolated from sputum or BAL to warrant more broad pulmonary coverage. The meropenem should cover a broad range of GNB.   - Would continue IV daptomycin with dose increased to 12 mg/kg/day if possible (adjust per pharmacy for CRRT/HD)   - Continue meropenem, given Cefepime resistant E coli.     History of left aspergillus empyema,  BAL galactomannan 1.37 (6/21)  S/p drainage and vori (S to both vori and posa) in 2019,  Calcified mass in left pleural cavity s/p biopsy 2020 with aspergillus on cx and path (S to both vori and posa). Vori changed to posaconazole. S/p left pleural effusion drainage April 2021 - exudative, fungal elements seen on KOH but fungal cx neg. Posa levels good and no recurrence of pleural effusion on Sep 2021 CT chest. Suspect that current left pleural effusion has been reactive to the calcified aspergillus elements there along with fluid disturbance related to dialysis. Do not think this is failure of anti-fungal therapy or recurrence of Aspergillus infection. It seems Dr. Layton has had discussions with surgery for source control (removal of calcified mass), however, there is was concern for high morbidity with surgery and therefore it was decided to pursue long term posaconazole, which she remains on since 2020. Now with BAL galactomannan 1.37 (first time it has been elevated since 10/2023).   - Continue posaconazole 300 mg daily. Level pending.  - Serum galactomannan level pending   - Will discuss with pulmonary tomorrow about definitive antifungal plan with elevated BAL galactomannan, given issues with tacrolimus dosing and azoles.          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 After treatment and stopping of valcyte, recurrence of CMV viremia to 1k in 02/2024, resumed valcyte ppx (renally dosed) and negative since 2/28/24. Valcyte again held in March 2024, with repeats negative to date. CMV negative 6/19/24.     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 Decreased to 135k 2/2022. Neg 6/27/22.   Increased to 1 million in 11/2023 s/p 1 dose of rituximab and decreasing/undetected in 05/2024. Negative EBV 6/19/24.    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 BAL showed septate hyphae. 7/2020 had hypodense mass in left lung with  biopsy showing fungal hyphae, cx aspergillus. Started on voriconazole in 2019, changed to posaconazole in 2020, she remains on 300 mg daily.  - 1/2021 BAL cx with steno: tx ceftazidime for 2 weeks  - 1/2021-2/2021 - ARDS due to PJP pneumonia (had stopped TMP-SMX due to side effects). Recovery from this required tracheostomy.  - 2019 - She had also grown Actinomyces from multiple BAL    Other Infectious Disease issues include:  - QTc interval:  450 msec on 6/18/24  - Bacterial prophylaxis:  azithromycin per Tx pulm  - Pneumocystis prophylaxis:  Bactrim for life (hx PJP)  - Viral serostatus & prophylaxis: CMV D+/R+, EBV D+/R+   - Fungal prophylaxis:  posaconazole  - Immunization status:  Seasonal influenza, RSV, and COVID vaccine UTD  - Gamma globulin status:  979 on 10/27/23  - Isolation status: contact for VRE      Attestation:  I interviewed the patient and obtained history from the patient, patient's , bedside RN, and by reviewing the patient's chart including outside records, microbiological data, and radiological data. All data are summarized in this notes.  Lissett Lewis MD  [facility]  Contact information available via [facility] Paging/Directory or [employer]    06/23/2024    75 MINUTES SPENT BY ME on the date of service doing chart review, history, exam, documentation & further activities per the note.      Interim History and Events:   Remains intubated, more sedated today.   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      History obtained from chart review, patient's , and patient. Reported increased lethargy reported for 2-3 days PTA, poor PO intake, more tired, and weak cough. Unable to bring up sputum. Denied fever, chills, urinary symptoms (on HD), diarrhea, abdominal pain at home. Her and  both reported sinus congestion recently,  improved quickly and Kecia declined. Denies sore throat, headache. Afebrile and without leukocytosis on admission. WBC 4.0, ESR 10, lactic 0.6, and CRP ~8. Put on BIPAP given respiratory acidosis with some improvement, however unable to tolerate off BIPAP and intubated for failure to improve hypercapnia. Started on azithromycin, vancomycin, and zosyn. Negative: RVP, flu/covid/rsv, MRSA nares, Cryptococcus antigen. Pending: EBV, CMV, blood culture, fungitell, Histo Ag, AGM, and sputum culture     CT chest-hi res on 6/18/24 notable for slight decreased confluent consolidative opacities in right lower lobe with more micronodular component of possible infection in the right lower lobe. Other abnormalities throughout the lungs similar to February of the transplanted lungs - pleural effusions, anastomoses intact with narrowing of R mainstem anastomosis. New narrowing of right bronchus intermedius with occlusion of RML bronchus (mucus plug per pulm). Febrile 100.7F following intubation, WBC 3.2, CRP up to 19, procal 0.24 in ESRD, nl lactic. Remains intubated, though on PS. On pressor x1 norepi, low dose 0.05. Nursing reports lots of secretion burden and weak cough. Planning for bronchoscopy today.     Has previously scheduled stent placement on 6/26/24 as outpatient.     ROS:  As mentioned in the interim history otherwise negative by reviewing constitutional symptoms, central and peripheral neurological  systems, respiratory system, cardiac system, GI system,  system, musculoskeletal, skin, allergy, and lymphatics.                 Physical Examination:  Temp: 99.5  F (37.5  C) Temp src: Axillary BP: (!) 90/25 Pulse: 90   Resp: 28 SpO2: 99 % O2 Device: Mechanical Ventilator    Vitals:    06/20/24 0430 06/20/24 2200 06/21/24 1700 06/22/24 0200   Weight: 55.6 kg (122 lb 9.2 oz) 56.2 kg (123 lb 14.4 oz) 57.6 kg (126 lb 15.8 oz) 57.6 kg (126 lb 15.8 oz)    06/23/24 0500   Weight: 57.7 kg (127 lb 3.3 oz)     Constitutional: More sedated today.   HEENT: NCAT, anicteric sclerae, conjunctiva clear. Moist mucous membranes.  Respiratory: Non-labored breathing, on vent PEEP 7, 50%.  Lungs are coarse bilaterally without wheezes  Cardiovascular: Tachycardic with regular rhythm with no murmur, rub or gallop.  GI: Abdomen is soft, non-distended, and non-tender to palpation. No suprapubic tenderness.  Skin: Warm and dry. Dusky toes bilaterally.  Musculoskeletal: Extremities grossly normal. No tenderness.   Neurologic: sedated, moves all extremities  VAD: Multiple central lines placed, all are c/d/i with no erythema, drainage, or tenderness.    Medications:  Medications that Require Transfusion:   Current Facility-Administered Medications   Medication Dose Route Frequency Provider Last Rate Last Admin    dexmedeTOMIDine (PRECEDEX) 4 mcg/mL in sodium chloride 0.9 % 100 mL infusion  0.1-1.2 mcg/kg/hr (Dosing Weight) Intravenous Continuous DilmairaChio crisostomo MD 8.9 mL/hr at 06/23/24 1000 0.7 mcg/kg/hr at 06/23/24 1000    dextrose 10% infusion   Intravenous Continuous PRN Awa Watt MD        dialysate for CVVHD & CVVHDF (Phoxillum BK4/2.5)  12.5 mL/kg/hr CRRT Continuous Jonna Rodas  mL/hr at 06/23/24 0326 12.5 mL/kg/hr at 06/23/24 0326    heparin (porcine) 20,000 units in 20 mL ANTICOAGULANT infusion (syringe from pharmacy)  500 Units/hr CRRT Continuous Hardy Tran, ADRIÁN CNS 0.5 mL/hr at 06/23/24 1000 500  Units/hr at 06/23/24 1000    norepinephrine (LEVOPHED) 16 mg in  mL infusion MAX CONC CENTRAL LINE  0.01-0.6 mcg/kg/min (Dosing Weight) Intravenous Continuous Kajal Chong APRN CNP 4.8 mL/hr at 06/23/24 1000 0.1 mcg/kg/min at 06/23/24 1000    POST-filter replacement solution for CVVHD & CVVHDF (Phoxillum BK4/2.5)   CRRT Continuous Jonna Rodas  mL/hr at 06/21/24 1414 New Bag at 06/21/24 1414    PRE-filter replacement solution for CVVHD & CVVHDF (Phoxillum BK4/2.5)  12.5 mL/kg/hr CRRT Continuous Jonna Rodas  mL/hr at 06/23/24 0325 12.5 mL/kg/hr at 06/23/24 0325    vasopressin 1 unit/mL MAX Conc (PITRESSIN) infusion  2.4 Units/hr Intravenous Continuous Awa Watt MD 2.4 mL/hr at 06/23/24 0800 2.4 Units/hr at 06/23/24 0800     Scheduled Medications:   Current Facility-Administered Medications   Medication Dose Route Frequency Provider Last Rate Last Admin    acetylcysteine (MUCOMYST) 10 % nebulizer solution 4 mL  4 mL Inhalation BID Velez Reyes, German, MD   4 mL at 06/23/24 0821    azithromycin (ZITHROMAX) tablet 250 mg  250 mg Oral or Feeding Tube Daily Velez Reyes, German, MD   250 mg at 06/23/24 0748    B and C vitamin Complex with folic acid (NEPHRONEX) liquid 5 mL  5 mL Per Feeding Tube Daily Awa Watt MD   5 mL at 06/23/24 0748    calcium carbonate (TUMS) chewable tablet 500 mg  500 mg Oral Once per day on Sunday Tuesday Thursday Saturday Josesito Pratt MD   500 mg at 06/23/24 0748    chlorhexidine (PERIDEX) 0.12 % solution 15 mL  15 mL Mouth/Throat Q12H Chio Cortez MD   15 mL at 06/23/24 0748    DAPTOmycin (CUBICIN) 400 mg in sodium chloride 0.9 % 100 mL intermittent infusion  8 mg/kg (Dosing Weight) Intravenous Q24H Awa Watt MD   400 mg at 06/22/24 1405    fiber modular (NUTRISOURCE FIBER) packet 1 packet  1 packet Per Feeding Tube Daily Awa Watt MD   1 packet at 06/22/24 3217    fludrocortisone (FLORINEF) tablet 0.1 mg  0.1 mg Oral or Feeding Tube Daily  Evelyn Roberts APRN CNP   0.1 mg at 06/23/24 0748    hydrocortisone sodium succinate PF (solu-CORTEF) injection 50 mg  50 mg Intravenous Q6H Evelyn Roberts APRN CNP   50 mg at 06/23/24 0417    insulin aspart (NovoLOG) injection (RAPID ACTING)  1-12 Units Subcutaneous Q4H Adilia Hunt MD   1 Units at 06/23/24 0749    levalbuterol (XOPENEX) neb solution 0.63 mg  0.63 mg Nebulization 4x Daily Gareth Mosquera MD   0.63 mg at 06/23/24 0821    levofloxacin (LEVAQUIN) infusion 500 mg  500 mg Intravenous Q24H Velez Reyes, German,  mL/hr at 06/22/24 1604 500 mg at 06/22/24 1604    [Held by provider] magnesium chloride CR tablet 1,070 mg  1,070 mg Oral Once per day on Sunday Tuesday Thursday Saturday Josesito Pratt MD        meropenem (MERREM) 1 g vial to attach to  mL bag  1 g Intravenous Q12H Velez Reyes, German, MD   1 g at 06/23/24 0506    [Held by provider] metoprolol tartrate (LOPRESSOR) tablet 25 mg  25 mg Oral BID Velez Reyes, German, MD        minocycline (MINOCIN) 100 mg in sodium chloride 0.9 % 100 mL intermittent infusion  100 mg Intravenous Q12H Kajal Chong APRN  mL/hr at 06/23/24 0027 100 mg at 06/23/24 0027    montelukast (SINGULAIR) tablet 10 mg  10 mg Oral At Bedtime Velez Reyes, German, MD   10 mg at 06/22/24 2210    oxyCODONE (ROXICODONE) tablet 5 mg  5 mg Oral or Feeding Tube Q6H Awa Watt MD   5 mg at 06/23/24 0523    pantoprazole (PROTONIX) 2 mg/mL suspension 40 mg  40 mg Per Feeding Tube Daily Josesito Pratt MD   40 mg at 06/22/24 1157    posaconazole (NOXAFIL) DR tablet TBEC 300 mg  300 mg Per Feeding Tube Daily Josesito Pratt MD   300 mg at 06/22/24 1157    [Held by provider] predniSONE (DELTASONE) tablet 5 mg  5 mg Oral Daily Velez Reyes, German, MD   5 mg at 06/21/24 0822    protein modular (PROSOURCE TF20) packet 1 packet  1 packet Per Feeding Tube BID Awa Watt MD   1 packet at 06/23/24 0749    QUEtiapine (SEROquel) tablet 50 mg  50 mg Oral or  "Feeding Tube BID Awa Watt MD   50 mg at 06/23/24 0748    sodium chloride (NEBUSAL) 3 % neb solution 3 mL  3 mL Nebulization BID Velez Reyes, German, MD   3 mL at 06/22/24 2111    sodium chloride (PF) 0.9% PF flush 10 mL  10 mL Intracatheter Q8H Kajal Chong, APRN CNP        sodium chloride (PF) 0.9% PF flush 10 mL  10 mL Intracatheter Q8H Kajal Chong APRN CNP   10 mL at 06/23/24 0749    sulfamethoxazole-trimethoprim (BACTRIM) 400-80 MG per tablet 1 tablet  1 tablet Oral or Feeding Tube Daily Josesito Pratt MD   1 tablet at 06/22/24 2010    [Held by provider] tacrolimus (GENERIC) suspension 0.6 mg  0.6 mg Oral QAM Velez Reyes, German, MD   0.6 mg at 06/21/24 0822    And    [Held by provider] tacrolimus (GENERIC) suspension 0.7 mg  0.7 mg Oral QPM Velez Reyes, German, MD   0.7 mg at 06/20/24 1810    Vitamin D3 (CHOLECALCIFEROL) tablet 50 mcg  50 mcg Oral Once per day on Monday Wednesday Friday Velez Reyes, German, MD   50 mcg at 06/21/24 0834         Laboratory Data:   No results found for: \"ACD4\"    Inflammatory Markers    Recent Labs   Lab Test 06/18/24  1418 10/29/23  0642 10/28/23  0725 10/07/19  1221 10/06/19  1444 09/13/19  0934 09/11/19  2325 08/22/19  0820   SED 10 66* 58* 93*  --  111* 102*  --    CRP  --   --   --   --  25.0* 58.0* 66.0* 47.0*       Immune Globulin Studies     Recent Labs   Lab Test 06/19/24  0839 10/27/23  0701 10/24/23  1033 09/15/21  0915 01/31/21  0441 01/25/21  0406 10/27/19  0621 03/01/18  0356 02/19/18  0759    979  --  1,198 763  --  936 698 1,790*   IGM  --   --   --   --   --   --   --   --  502*   IGE  --   --  <2  --   --  <2  --   --  <2   IGA  --   --   --   --   --   --   --   --  425*   IGG1  --   --   --   --   --   --   --   --  1,300*   IGG2  --   --   --   --   --   --   --   --  131*   IGG3  --   --   --   --   --   --   --   --  101   IGG4  --   --   --   --   --   --   --   --  1*       Metabolic Studies       Recent Labs   Lab Test 06/23/24  0745 " 06/23/24  0414 06/23/24  0019 06/22/24 2024 06/22/24  1614 06/22/24  1613 06/22/24  0441 06/22/24  0439 06/22/24  0041 06/22/24  0039 06/22/24  0035 06/21/24 2022 06/21/24  1638 06/21/24  1626 06/21/24  1208 06/21/24  0356 06/18/24  1942 06/18/24  1803 06/27/22  1013 05/26/22  0750   NA  --  139  --   --   --  139  --  137  137  --  136  --   --   --  135  --  134*  134*   < > 137   < > 137   POTASSIUM  --  4.2  --   --   --  3.9  --  3.9  3.9  --  3.2*  --   --   --  3.5  --  3.6  3.6   < > 4.1   < > 3.5   CHLORIDE  --  109*  --   --   --  109*  --  106  106  --  106  --   --   --  104  --  103  103   < > 100   < > 96   CO2  --  22  --   --   --  21*  --  21*  21*  --  21*  --   --   --  21*  --  20*  20*   < > 27   < > 32   ANIONGAP  --  8  --   --   --  9  --  10  10  --  9  --   --   --  10  --  11  11   < > 10   < > 9   BUN  --  27.0*  --   --   --  28.0*  --  29.0*  29.0*  --  31.7*  --   --   --  33.0*  --  32.3*  32.3*   < > 17.8   < > 42*   CR  --  1.15*  --   --   --  1.39*  --  1.67*  1.67*  --  1.85*  --   --   --  2.30*  --  2.72*  2.72*   < > 3.33*   < > 3.98*   GFRESTIMATED  --  54*  --   --   --  43*  --  34*  34*  --  30*  --   --   --  23*  --  19*  19*   < > 15*   < > 12*   * 172*  167* 179* 170* 166* 185*   < > 180*  180*   < > 217*  --   --    < > 225*   < > 158*  155*  155*   < > 100*   < > 110*   A1C  --   --   --   --   --   --   --   --   --   --   --   --   --   --   --   --   --   --   --  5.2   CHELSEY  --  8.0*  --   --   --  7.9*  --  8.0*  8.0*  --  7.9*  --   --   --  7.7*  --  7.9*  7.9*   < > 8.5*   < > 9.5   PHOS  --  3.5  --   --   --  2.4*  --  2.5  --   --   --   --   --  3.0  --  2.6   < > 4.1   < >  --    MAG  --  2.3  --   --   --  2.4*  --  2.3  --  2.2  --   --   --  2.2  --  2.2   < > 2.0   < > 1.8   LACT  --   --   --   --   --   --   --   --   --   --  0.9 0.8  --   --   --   --    < > 0.6*   < >  --    CKT  --   --   --   --   --   --   --    --   --   --   --   --   --   --   --  23*  --  107   < >  --     < > = values in this interval not displayed.       Hepatic Studies    Recent Labs   Lab Test 06/23/24  0414 06/22/24  1613 06/22/24  0439 06/22/24  0039 06/21/24  1626 06/21/24  0356 06/20/24  1506 06/20/24  0420 06/19/24  0613 06/18/24  1803 09/15/21  0915 05/01/21  0654 01/27/21  0414 01/26/21  0425   BILITOTAL 0.8  --  1.0  --   --  1.1  --  0.9 0.8 0.6   < >  --    < > 0.8   ALKPHOS 78  --  89  --   --  123  --  153* 175* 235*   < >  --    < > 184*   ALBUMIN 2.4* 2.3* 2.3*  2.3* 2.3* 2.2* 2.4*  2.4*   < > 2.7* 2.7* 3.6   < >  --    < > 2.0*   AST 17  --  11  --   --  12  --  26 31 39   < >  --    < > 11   ALT 20  --  17  --   --  20  --  23 28 39   < >  --    < > 30   LDH  --   --   --   --   --   --   --   --   --   --   --  164  --  187   GGT  --   --   --   --   --   --   --   --   --  147*  --   --   --   --     < > = values in this interval not displayed.       Pancreatitis testing    Recent Labs   Lab Test 06/18/24  1803 10/25/23  1356 05/26/22  0750 09/15/21  0915 01/24/21  0000 02/19/20  0713 12/31/19  1033 10/24/19  2032 10/21/19  1451 10/06/19  1444 09/11/19  2325 03/04/18  0353 02/19/18  0759   AMYLASE  --   --   --   --   --   --   --   --   --   --   --   --  58   LIPASE 40 24  --   --   --   --   --  212 119 172 127  --   --    TRIG  --   --  155* 68 242* 77 98  --   --   --   --  191* 115       Hematology Studies      Recent Labs   Lab Test 06/23/24  0414 06/22/24  1615 06/22/24  0439 06/22/24  0034 06/21/24  0356 06/20/24  0420 06/19/24  2359 06/18/24  1418 03/05/24  0925   WBC 5.4 6.0 6.0 6.4 5.8 6.0 5.5   < > 1.0*   ANEU 4.6  --  5.2  --  5.1 5.3 4.8  --  0.2*   ALYM 0.1*  --  0.0*  --  0.1* 0.1* 0.1*  --  0.6*   SHERRI 0.4  --  0.7  --  0.5 0.5 0.4  --  0.2   AEOS 0.2  --  0.1  --  0.1 0.1 0.1  --  0.0   HGB 7.8* 8.0* 8.1* 8.4* 9.6* 10.4* 10.1*   < > 10.5*   HCT 24.9* 25.3* 25.9* 27.2* 29.8* 32.2* 31.1*   < > 33.3*   PLT 84* 89*  "95* 84* 98* 109* 102*   < > 96*    < > = values in this interval not displayed.       Arterial Blood Gas Testing    Recent Labs   Lab Test 06/23/24  0906 06/23/24  0640 06/23/24  0414 06/22/24  2223 06/22/24  0747   PH 7.32* 7.37 7.28* 7.32* 7.32*   PCO2 47* 43 53* 47* 46*   PO2 63* 80 81 66* 75*   HCO3 24 25 25 24 24   O2PER 50  50 60 60 60 50        Urine Studies     Recent Labs   Lab Test 06/19/24  1214 01/24/21  1729 10/21/19  2240 09/12/19  0125 08/07/19  1512   URINEPH 6.5 5.0 5.0 7.5* 7.0   NITRITE Negative Negative Negative Negative Negative   LEUKEST Large* Moderate* Large* Negative Trace*   WBCU >182* 34* 115* 3 19*       Vancomycin Levels     Recent Labs   Lab Test 06/19/24  0613 10/26/23  0606 09/21/21  1911 01/28/21  0412 01/26/21  0425 01/25/21  1259   VANCOMYCIN 15.6 18.6 18.8 21.6 31.4* 15.1       Tobramycin levels     No lab results found.    Gentamicin levels    No lab results found.    Tacrolimus levels    Invalid input(s): \"TACROLIMUS\", \"TAC\", \"TACR\"      Latest Ref Rng & Units 6/23/2024     4:14 AM 6/22/2024     4:15 PM 6/22/2024     4:13 PM 6/22/2024     4:39 AM 6/22/2024    12:39 AM   Transplant Immunosuppression Labs   Creat 0.51 - 0.95 mg/dL 1.15   1.39  1.67     1.67  1.85    Urea Nitrogen 8.0 - 23.0 mg/dL 27.0   28.0  29.0     29.0  31.7    WBC 4.0 - 11.0 10e3/uL 5.4  6.0   6.0     Neutrophil % 85    86     ANEU 1.6 - 8.3 10e3/uL 4.6    5.2         Cyclosporine levels    Invalid input(s): \"CYCLOSPORINE\", \"CYC\"    Mycophenolate levels    Invalid input(s): \"MYPA\", \"MYP\"    Sirolimus levels    Invalid input(s): \"SIROLIMUS\", \"SIR\", \"RAPA\"    CSF testing   No lab results found.      Microbiology:  6/19  CrAg - negative  Urine Strep and Legionella antigens - negative  Urine culture - GNB  BAL - negative KOH, pending aerobic + fungal + AFB cultures    6/18  Negative RVP and COVID/flu/RSV  Histoplasma antigen (blood) - pending  Aspergillus galactomannan - negative  Fungitell - negative  Blood " cultures x2 - NGTD    Sputum culture - Stenotrophomonas maltophilia      Fungal testing  Recent Labs   Lab Test 06/19/24  1513 06/18/24  1803 10/07/19  1544 09/14/19  1625   FGTL  --  <31   < > 122   FGTLI  --  Negative   < > Positive*   PJRDFA Not Detected  --    < >  --    ASPGAI 1.87 0.03   < > 1.08   ASPAG Positive*  --    < >  --    ASPGAA  --  Negative   < > Positive*   CIABB  --   --   --  <1:2   COFUNG  --  <1:2  --   --     < > = values in this interval not displayed.       Last Culture results   S Pneumoniae Antigen   Date Value Ref Range Status   01/24/2021   Final    Negative, no Streptococcus pneumoniae antigen detected by immunochromatographic membrane   assay. A negative Streptococcus pneumoniae antigen result does not rule out infection with   Streptococcus pneumoniae.       Streptococcus pneumoniae antigen   Date Value Ref Range Status   06/19/2024 Negative Negative Final     Comment:     A negative Streptococcus pneumoniae antigen result does not rule out infection with Streptococcus pneumoniae.     P. jirovecii By PCR   Date Value Ref Range Status   06/19/2024 Not Detected  Final     Comment:       NOT DETECTED - A negative result does not rule out the   presence of PCR inhibitors in the patient specimen or assay   specific nucleic acid in concentrations below the level of   detection by the assay.    This test was developed and its performance characteristics   determined by [facility]. It has not been cleared or   approved by the [place] Food and Drug Administration. This test   was performed in a CLIA certified laboratory and is   intended for clinical purposes.  Performed By: [facility]  [address] [place] [id number]  : Rogelio Llanos MD, PhD  CLIA Number: [id number]   10/17/2023 Not Detected  Final     Comment:     NOT DETECTED - A negative result does not rule out the   presence of PCR inhibitors in the patient specimen or   assay specific nucleic  acid in concentrations below the   level of detection by the assay.    This test was developed and its performance characteristics   determined by [facility]. It has not been cleared or   approved by the [place] Food and Drug Administration. This test   was performed in a CLIA certified laboratory and is   intended for clinical purposes.  Performed By: [facility]  [address] [place] [address]  : Rogelio Llanos MD, PhD  CLIA Number: [id number]   08/17/2023 Not Detected  Final     Comment:     NOT DETECTED - A negative result does not rule out the   presence of PCR inhibitors in the patient specimen or   assay specific nucleic acid in concentrations below the   level of detection by the assay.    This test was developed and its performance characteristics   determined by [facility]. It has not been cleared or   approved by the US Food and Drug Administration. This test   was performed in a CLIA certified laboratory and is   intended for clinical purposes.  Performed By: [facility]  [address] [place] [address]  : Rogelio Llanos MD, PhD  CLIA Number: [id number]   08/17/2023 Not Detected  Final     Comment:     NOT DETECTED - A negative result does not rule out the   presence of PCR inhibitors in the patient specimen or   assay specific nucleic acid in concentrations below the   level of detection by the assay.    This test was developed and its performance characteristics   determined by [facility]. It has not been cleared or   approved by the US Food and Drug Administration. This test   was performed in a CLIA certified laboratory and is   intended for clinical purposes.  Performed By: [facility]  [address] [place] [address]  : Rogelio Llanos MD, PhD  CLIA Number: [id number]   01/24/2021 Detected (A)  Final     Comment:     (Note)  DETECTED - P. jirovecii DNA detected in  this specimen.  Results should be used to aid in the diagnosis of PCP  pneumonia and must be interpreted in the context of host  risk factors, clinical presentation, and radiographic  imaging.  TEST INFORMATION: Pneumocystis jirovecii Detection by PCR  Test developed and characteristics determined by [facility]. See Compliance Statement B: [facility]  Performed by [facility],  [address] [place] [address] [phone]  [email or web address] Carri Red MD, Lab. Director       Culture   Date Value Ref Range Status   06/21/2024 Culture in progress  Preliminary   06/21/2024 No growth after 1 day  Preliminary   06/21/2024 No growth after 1 day  Preliminary   06/21/2024 No growth after 1 day  Preliminary   06/19/2024 1+ Stenotrophomonas maltophilia (A)  Preliminary   06/19/2024 1+ Enterococcus faecalis (A)  Preliminary   06/19/2024 No growth after 3 days  Preliminary   06/19/2024 Culture in progress  Preliminary   06/19/2024 >100,000 CFU/mL Escherichia coli (A)  Preliminary   06/19/2024 >100,000 CFU/mL Enterococcus faecium VRE (A)  Preliminary   06/19/2024 50,000-100,000 CFU/mL Gram negative bacilli (A)  Preliminary   06/18/2024 3+ Normal alo  Final   06/18/2024 2+ Stenotrophomonas maltophilia (A)  Final   06/18/2024 2+ Stenotrophomonas maltophilia (A)  Final   06/18/2024 No growth after 4 days  Preliminary   06/18/2024 No growth after 4 days  Preliminary   02/15/2024 No Actinomyces like species isolated  Final   02/15/2024 No Growth  Final   02/15/2024 No Growth  Final   02/15/2024 1+ Normal alo  Final     GS Culture   Date Value Ref Range Status   06/21/2024 See corresponding culture for results  Final   06/19/2024 See corresponding culture for results  Final     Culture Micro   Date Value Ref Range Status   05/01/2021   Final    Quantity not sufficient  Called to Maxim Norman at 1236 5/1/21.    mlb     05/01/2021 Culture negative after 30 days  Final   04/29/2021 No anaerobes isolated  Final    04/29/2021 No growth  Final   02/19/2021 Culture negative for acid fast bacilli  Final   02/19/2021   Final    Assayed at [facility] [address] [place] [address] [phone]   02/19/2021 Culture negative after 4 weeks  Final   02/19/2021 No growth after 4 weeks  Final   02/19/2021 Moderate growth  Staphylococcus epidermidis   (A)  Final   02/09/2021 No growth  Final         Last check of C difficile  C Diff Toxin B PCR   Date Value Ref Range Status   02/13/2021 Negative NEG^Negative Final     Comment:     Negative: C. difficile target DNA sequences NOT detected, presumed negative   for C.difficile toxin B or the number of bacteria present may be below the   limit of detection for the test.  FDA approved assay performed using [employer] real-time PCR.  A negative result does not exclude actual disease due to C. difficile and may   be due to improper collection, handling and storage of the specimen or the   number of organisms in the specimen is below the detection limit of the assay.       C Difficile Toxin B by PCR   Date Value Ref Range Status   06/22/2024 Negative Negative Final     Comment:     A negative result does not exclude actual disease due to C. difficile and may be due to improper collection, handling and storage of the specimen or the number of organisms in the specimen is below the detection limit of the assay.       Virology:  Coronavirus-19 testing    Recent Labs   Lab Test 06/18/24  1729 06/18/24  1433 06/17/24  1337 07/11/23  1132 10/12/22  1528 09/26/22  0956 05/23/22  1013 04/01/22  1257 02/07/22  1301 01/31/22  1309 01/24/22  1324 09/20/21  1859 06/07/21  1310 05/02/21  0715 04/26/21  1151 04/08/21  0723   [id number]  --   --   --   --   --   --   --   --   --   --   --   --   --  Negative  --   --    [id number]  --   --   --   --   --   --   --   --   --   --   --   --   --  Not Applicable  --   --    [id number] Negative Negative  --  Negative  --   --   --   --   --   --    --  Negative   < >  --   --  Test received-See reflex to IDDL test SARS CoV2 (COVID-19) Virus RT-PCR  NEGATIVE   [id number]  --   --   --   --   --   --   --   --   --   --   --   --   --   --   --  Nasopharyngeal   [id number]  --   --   --   --   --   --   --   --   --   --   --   --   --   --   --  Nasopharyngeal   COVIDPCREXT  --   --  Negative  --  Negative Negative Undetected   < > Undetected Undetected   < >  --    < >  --   --   --    SOUREXT  --   --   --   --   --   --  Nasopharynx  --  Nasopharynx Nasopharynx   < >  --    < >  --    < >  --    [id number]  --   --   --   --   --   --  Undetected  --  Undetected Undetected   < >  --    < >  --    < >  --     < > = values in this interval not displayed.       Respiratory virus (non-coronavirus-19) testing    Recent Labs   Lab Test 06/18/24  1729 06/18/24  1433 11/01/23  0920 10/27/23  0449 01/24/21  1822 01/24/21  1629 12/23/20  1102 02/27/18  1016 02/22/18  0900   RVSPEC  --   --   --   --   --  Bronchial Bronchial   < >  --    AFLU  --   --   --   --   --   --   --   --  Duplicate request*   IFLUA Not Detected  --  Not Detected Not Detected   < > Negative Negative   < >  --    INFZA Negative   < >  --   --   --   --   --   --  Negative   FLUAH1 Not Detected  --  Not Detected Not Detected   < > Negative Negative   < >  --    [id number] Not Detected  --  Not Detected Not Detected   < > Negative Negative   < >  --    FLUAH3 Not Detected  --  Not Detected Not Detected   < > Negative Negative   < >  --    BFLU  --   --   --   --   --   --   --   --  Duplicate request*   IFLUB Not Detected  --  Not Detected Not Detected   < > Negative Negative   < >  --    INFZB Negative   < >  --   --   --   --   --   --  Negative   PIV1 Not Detected  --  Not Detected Not Detected   < > Negative Negative   < >  --    PIV2 Not Detected  --  Not Detected Not Detected   < > Negative Negative   < >  --    PIV3 Not Detected  --  Not Detected Not Detected   < > Negative Negative   <  >  --    PIV4 Not Detected  --  Not Detected Not Detected   < >  --   --    < >  --    IRSV Negative   < >  --   --   --   --   --   --  Negative   HRVS  --   --   --   --   --  Negative Negative   < >  --    RSVA Not Detected  --  Not Detected Not Detected   < > Negative Negative   < >  --    RSVB Not Detected  --  Not Detected Not Detected   < > Negative Negative   < >  --    HMPV Not Detected  --  Not Detected Not Detected   < > Negative Negative   < >  --    ADVBE  --   --   --   --   --  Negative Negative   < >  --    ADVC  --   --   --   --   --  Negative Negative   < >  --    ADENOV Not Detected  --  Not Detected Not Detected   < >  --   --    < >  --    CORONA Not Detected  --  Not Detected Not Detected   < >  --   --    < >  --     < > = values in this interval not displayed.       CMV viral loads    Recent Labs   Lab Test 06/18/24  1803 06/12/24  0828 03/13/24  0853 03/05/24  0925 02/21/24  0838 02/14/24  1050 12/27/23  0834 12/19/23  1138 11/29/23  0826 11/21/23  0752 11/08/23  0846 11/01/23  0920 10/31/23  0606 10/24/23  1033 10/17/23  1011 10/04/23  0810 09/07/23  0710 08/17/23  1144 08/17/23  1137 08/08/23  0828 08/08/23  0828 07/11/23  0952 05/26/23  0828 04/06/23  0725 03/08/23  0848 02/09/23  1034 11/18/22  0928 09/27/22  1012 03/15/21  0904 02/25/21  0542   CMVQNT <35*  --   --  Not Detected  --   --   --  Not Detected  --  Not Detected  --   --  <35*  --   --   --   --   --   --   --   --  Not Detected  --  Not Detected  --  <137*  --  Not Detected   < > CMV DNA Not Detected   CMVRESINST  --   --   --   --   --  350*  --   --   --   --   --   --   --  103*  80* 75*  --  521*  --   --   --  46*  --   --   --   --   --   --   --   --   --    CSPEC  --   --   --   --   --   --   --   --   --   --   --   --   --   --   --   --   --   --   --   --   --   --   --   --   --   --   --   --   --  EDTA PLASMA   CMVLOG <1.5  --   --   --   --  2.5  --   --   --   --   --   --  <1.5 2.0  1.9 1.9  --  2.7   "--   --    < > 1.7  --   --   --   --  <2.1  --   --    < > Not Calculated   03839  --  46*   < >  --    < >  --    < >  --    < >  --    < >  --   --   --   --    < >  --   --   --   --   --   --    < >  --    < >  --    < >  --    < >  --    CMVQAL  --   --   --   --   --   --   --   --   --   --   --  Not Detected  --   --   --   --   --  Not Detected Not Detected  --   --   --   --   --   --   --   --   --   --   --     < > = values in this interval not displayed.       No results found for: \"H6RES\"    EBV DNA Copies/mL   Date Value Ref Range Status   05/01/2021 38,186 (A) EBVNEG^EBV DNA Not Detected [Copies]/mL Final   02/22/2021 75,709 (A) EBVNEG^EBV DNA Not Detected [Copies]/mL Final   01/25/2021 5,619 (A) EBVNEG^EBV DNA Not Detected [Copies]/mL Final   12/09/2020 10,686 (A) EBVNEG^EBV DNA Not Detected [Copies]/mL Final   09/09/2020 2,935 (A) EBVNEG^EBV DNA Not Detected [Copies]/mL Final   03/09/2020 8,918 (A) EBVNEG^EBV DNA Not Detected [Copies]/mL Final   02/19/2020 38,419 (A) EBVNEG^EBV DNA Not Detected [Copies]/mL Final   12/18/2019 11,933 (A) EBVNEG^EBV DNA Not Detected [Copies]/mL Final   11/11/2019 9,669 (A) EBVNEG^EBV DNA Not Detected [Copies]/mL Final   10/24/2019 13,391 (A) EBVNEG^EBV DNA Not Detected [Copies]/mL Final   08/01/2018 EBV DNA Not Detected EBVNEG^EBV DNA Not Detected [Copies]/mL Final       BK viral loads   Recent Labs   Lab Test 08/07/19  0959   BKSPEC Plasma   BKRES BK Virus DNA Not Detected         Imaging:  XR Chest Port 1 View  Result Date: 6/20/2024  Impression: Bilateral pulmonary opacities, mildly increased in the upper lobes suggesting increased edema and underlying infection. The endotracheal tube projects 2.3 cm above the gee.    XR Chest Port 1 View  Result Date: 6/19/2024  IMPRESSION: ET tube tip projects 1.6 cm above the gee. Small bilateral pleural effusions with bibasilar, right greater than left airspace opacities suspicious for infection versus atelectasis. "     CT Chest Hi-Resolution wo Contrast  Result Date: 6/18/2024  IMPRESSION: Bilateral transplanted lungs. Slight decreased confluent consolidative opacities in right lower lobe with more micronodular component of possible infection in the right lower lobe. Other abnormalities throughout the lungs similar to February of the transplanted lungs. Pleural effusions again noted. Narrowing of the bronchus intermedius on the right there appears to be occlusion of the right middle lobe bronchus with narrowing of the distal aspect of the right lower lobe bronchus with short segment occlusions of the medial and posterior basilar bronchial segments to the right lower lobe. No such abnormality on the left.. No ectopic air. Anastomoses appear grossly intact bilaterally with just under approximate 50% narrowing distal to the right mainstem anastomosis proximal to the upper lobe bronchus takeoff. This right middle lobe bronchial occlusion is new from February. Mitral annular calcifications with left atrial prominence, please correlate for arrhythmia. Borderline pulmonary enlargement concerning for possible pulmonary hypertension. Borderline mid ascending aortic ectasia.       ECHO:  Echo Complete  Result Date: 6/19/2024  Interpretation Summary Global and regional left ventricular function is normal with an EF of 55-60%. Right ventricular function, chamber size, wall motion, and thickness are normal. The inferior vena cava cannot be assessed. No pericardial effusion is present. No significant valvular abnormalities present.     Lissett Lewis MD  [facility]  Contact information available via [facility] Paging/Directory

## 2024-06-23 NOTE — PLAN OF CARE
ICU End of Shift Summary. See flowsheets for vital signs and detailed assessment.    Changes this shift: Pt continued to have increased restlessness and agitation. Requiring increased fiO2. pO2 worsened. Per MD, sedate pt with precedex and increase fentanyl for improved vent synchrony. Pt requiring max dose precedex and fentanyl to 150 to synch with vent rate. RASS -3. RR increased to 25 for increasing pCo2 and pH 7.28.. Vent noted to be frequently overcounting RR. ABG this AM improved. Attempting to maintain I=O on CRRT. Pt net +2L yesterday, net + 100ml since midnight. Pt continues on Levo and Vaso for MAP goal 65. Right raidal art line continues to be extremely positional overnight, becoming very difficult to get blood return for labs this morning.    Plan: Continue to wean pressors as able. Continue to pull fluid on CRRT per renal orders. Continue to monitor and POC.    Goal Outcome Evaluation:      Plan of Care Reviewed With: patient    Overall Patient Progress: no changeOverall Patient Progress: no change    Outcome Evaluation: Varying pressor needs. CRRT continues, attempting to maintain I=O. Increased restelessness and agitation.

## 2024-06-23 NOTE — PROCEDURES
[facility]    Arterial line placement    Date/Time: 6/23/2024 4:10 PM    Performed by: Velez Reyes, German, MD  Authorized by: Velez Reyes, German, MD      UNIVERSAL PROTOCOL   Site Marked: Yes  Prior Images Obtained and Reviewed:  Yes  Required items: Required blood products, implants, devices and special equipment available    Patient identity confirmed:  Arm band, provided demographic data and hospital-assigned identification number  Patient was reevaluated immediately before administering moderate or deep sedation or anesthesia  Confirmation Checklist:  Patient's identity using two indicators, relevant allergies, procedure was appropriate and matched the consent or emergent situation and correct equipment/implants were available  Time out: Immediately prior to the procedure a time out was called    Universal Protocol: the Joint Commission Universal Protocol was followed    ESBL (mL):  3  Indication:  multiple ABGs respiratory failure hemodynamic monitoring  Location:  Right femoral       ANESTHESIA    Anesthesia:  Local infiltration  Local Anesthetic:  Lidocaine 1% without epinephrine  Anesthetic Total (mL):  3      SEDATION  Patient Sedated: Yes    Sedation Type:  Moderate (conscious) sedation  Sedation:  Fentanyl  Vital signs: Vital signs monitored during sedation      PROCEDURE DETAILS    Needle Gauge:  20      Number of Attempts:  3  Post-procedure:  Line sutured and dressing applied  CMS: normal    PROCEDURE    Length of time physician/provider present for 1:1 monitoring during sedation: 45      Germán L. Vélez Reyes, MD, PhD  Internal Medicine Resident

## 2024-06-24 NOTE — PLAN OF CARE
ICU End of Shift Summary. See flowsheets for vital signs and detailed assessment.    Changes this shift: Tmax 100.5. Blood and sputum cultures ordered and sent. Pt continues with agitation and restlessness. Maxed on precedex and fentanyl gtts per order. Scheduled and PRN seroquel given. Pt able to follow simple commands but very confused/delirious. Difficult to titrate pressor and fiO2 needs with agitation. Pt briefly desats to 70s with agitation but recovers quickly. Unsuccessfully attempted to wean vaso off x2. Pt continues on levo and vaso gtts. Pt incontinent of urine x2, unmeasured on chux pad. CRRT continues to keep I=O/slightly negative.     Plan: Continue to wean pressors and vent as able. Manage agitation and delirium. Continue CRRT orders per renal. Continue plan of care.     Goal Outcome Evaluation:      Plan of Care Reviewed With: patient    Overall Patient Progress: no changeOverall Patient Progress: no change    Outcome Evaluation: Pt's vital signs labile with agitation. Tmax 100.5. Able to pull fluid on CRRT.

## 2024-06-24 NOTE — PROGRESS NOTES
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     Changes today  - Vent settings adjusted with FiO2 increased to 60% from 45% for desaturation to low 80s  - Platelets down trending 84> 69, continue to monitor  - Added 3 mg melatonin and additional 100 mg of quetiapine before bed for increased nighttime agitation  - Discontinued vasopressin AM but restarted this afternoon for MAPs <65, currently on NE and vasopressin  - Improved p/f ratio to 246 from 130  - CRRT with goal net negative -1L,   - Discontinue levofloxacin per ID as Stenotrophomonas is resistant  - Tacrolimus levels to be checked BID    PLAN:    Neuro:  # Pain   - Fentanyl gtt with boluses PRN  - Acetaminophen 325mg q4H    # Sedation  # Toxic metabolic encephalopathy, worsening  Presented with 2-3 days of lethargy, decreased appetite, and fatigue. Previously has improved with BiPAP/intubation. Ongoing lethargy in setting of sepsis and delirium in the setting of high-dose steroid therapy.   - Quetiapine 50mg BID and 100 mg at bedtime PRN  -Start 3 mg melatonin at bedtime for agitation and improved sleep  - Precedex gtt, avoid increasing fentanyl for sedation   - Revisit stress dose steroids in a few days if mental status does not improve.  - RAAS goal 0 to -1    Pulmonary:  # Acute on chronic hypoxic hypercapnic respiratory failure  # Severe Pulmonary edema  # Acute respiratory distress syndrome, progressive  Presented to the ED on 6/18/2024 with SOB and  hypercapnic respiratory failure. Workup significant for Stenotrophomonas pneumonia. Acute on chronic hypoxic hypercapnic respiratory failure likely secondary to pneumonia with exacerbation by pulmonary edema (diffuse bilateral opacities on CXR, uptrending patient weight 50kg to 57kg). On 6/21, oxygenation and lung pressures consistent with ARDS physiology (6/21 P/F ratio 206, elevated plateau pressures 30-34). On 6/22, progression of ARDS (6/22 P/F ratio 150) with ongoing elevated plateau pressures. Will plan to continue antibiotics and aggressive pulmonary toilet, volume management (net even with CRRT I/O), and lung protective ventilation () to reduce risk of barotrauma while increasing PEEP (8) to maximize pulmonary recruitment.   - Antibiotics, as below  - Volume management with CRRT, as below  - Mechanical ventilation, wean as able   Hold daily SBT trial with ARDS physiology   Lung protective ventilation (tidal volume 320, PEEP 7)  - Pulmonary toilet   Mucomyst BID   Hypertonic saline BID, alternate with mucomyst   Albuterol neb QID  - Appears increased left lung opacity on CXR compared to previous images- will continue to monitor and order CT chest if repeat CXR demonstrates worsening opacity.  - Did not tolerate volera    Vent Mode: CMV/AC  (Continuous Mandatory Ventilation/ Assist Control)  FiO2 (%): 60 %  Resp Rate (Set): 18 breaths/min  Tidal Volume (Set, mL): 320 mL  PEEP (cm H2O): 7 cmH2O  Resp: 28    # Healthcare-associated pneumonia, Stenotrophomonas and Enterococcus faecalis  # Positive bronchoscopy galactomannan  Presented to the ED on 6/18/2024 with SOB and hypercapnic respiratory failure. Found on bronchoscopy (6/19) to have RML obstruction by narrowed bronchus intermedius as well as copious mucopurulent secretions/mucus plugging. Bronchoscopy culture (6/19) is growing Stenotrophonomas and Enterococcus faecalis (sensitivities pending) and positive bronchoscopy galactomannan (6/19 1/87).  Previously treated for Stenotrophonomas/aspergillus pneumonia in 11/2023 with subsequent sputum culture (2/2024) negative for both Stenotrophonomas/Aspergillus. Can consider repeat Stenotrophomonas infection vs opportunistic infection from colonized microbe. Lower suspicion for aspergillus pneumonia (has been on posaconazole chronically) but this is possible. Plan to continue antibiotics, pulmonary toilet. With worsening respiratory status, can consider broadening empiric antibiotics from Zosyn to Meropenem  - Transplant pulmonology consult, appreciate recs  - Transplant ID consult, appreciate recs  - Antibiotics   S/p vancomycin (6/18-6/20)   S/p zosyn (6/18- 6/21)  PTA posaconazole 300mg every day (treatment dose)  Minocycline (6/20- present)    Meropenem (6/21- present)    Discontinue levofloxacin, as her Stenotrophomonas is resistant, per ID       # Recurrent right middle bronchus stenosis s/p dilation and stent (6/20/2024)  Has history (bronchoscopy 2/15/24) demonstrating narrowing of right mainstem transplant anastomosis and bronchus intermedius. CT chest (6/18/2024) and bronchoscopy (6/19/2024) demonstrated occlusion of right middle bronchus. With concern for post-obstructive pneumonia, interventional pulmonology was consulted, and completed bronchoscopy (6/20/2024) with tissue debulking, right middle bronchus balloon dilation to 11 mm, stent placement in right main bronchus, and bifurcating cast placement in right upper bronchus. Plan for saline nebs BID and outpatient bronchoscopy in 6 weeks.   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 Per transplant, will stay at current does and reassess after rechecking levels tomorrow AM.  - Transplant pulm consult,  appreciate recs  - PTA nebs               Fluticasone-viilanterol (breo ellipta) every day - HOLD with respiratory infection   Montelukast every day   - Immunosuppressants per Tx Pulm:                PTA Tacrolimus (dosing per tx pulm), recheck levels on 6/21               PTA Prednisone 5 mg daily - HOLD with stress dose steroids   PTA azathioprine - HOLD per Transplant pulmonology with repeat infections    Cardiovascular:  # Shock  On 6/19/2024, developed sepsis (hypotension 80s/50s, tachypnea 24) in the setting of stenotrophomonas pneumonia/enterococcus faecium VRE UTI. Etiology of shock likely distributive; less likely hypovolemic (not pulling volumes with CRRT), medication-associated (weaned off of propofol gtt) or obstructive (low suspicion for PE, echo 6/19 without evidence of cardiac dysfunction). Maximum pressor needs 6/21 evening, with improvement with adjusted antibiotic therapy.   - IV pressors, wean as able    Norepi gtt   Vasopressin gtt  - MAP goal >65  - Stress dose steroids   Hydrocortisone 50mg q6H (6/21-present)   Fludrocortisone 0.1mg every day (6/21-present)    # Demand Ischemia, resolved    Presented with elevated troponin (peak 499). Not associated with chest pain or hypoxia. EKG without ST elevations/depressions or T wave inversions. Suspect demand ischemia in the setting of sepsis.   - Monitor symptoms  - Telemetry     # Hx Paroxysmal A-Fib  # Hx Pulmonary Hypertension (45 mmHg)  History of pulmonary hypertension (45 mmHg, echo 10/2023) in the setting of ILD and paroxysmal afib on PTA metoprolol tartrate BID. Not on anticoagulation for afib.   - PTA metoprolol tartrate 25mg BID - HOLD with soft pressures    GI/Nutrition:  # Nutrition  NJT placed (6/19/2024).   - Nutrition consult   Tube feeds 30ml/hr   Fiber 1 packet daily    # Diarrhea, resolved  On 6/21, had 8 bowel movements. Occurred in the setting of scheduled bowel regimen and starting new antibiotics (meropenem, levofloxacin). C diff  negative. Will stop scheduled bowel regimen and start fiber.  - Bowel regimen   Miralax daily PRN   Senna daily PRN    # Elevated alk phos, resolved  Admission with elevated alkaline phosphatase (237) with elevated GGT consistent with hepatic etiology. Not associated with transaminitis. Etiology may be medication associated vs cholestatic. Now resolved.  - Monitor LFTs    Renal/Fluids/Electrolytes:  # ESRD on iHD (M,W,F)  History of ESRD 2/2 Tacrolimus toxicity on HD (MWF). With soft blood pressures, placed RIJ (6/20/2024) and started CRRT (6/20/2024).  - Nephrology consult   CRRT: aim for net negative 500ml.   - Renally-dosed medications  - RN electrolyte replacement       Endocrine:  # Risk for hyperglycemia with stress-dose steroids  - HSSI     ID:  # Stenotrophomonas pneumonia  # Positive bronchoscopy galactomannan  Presented to the ED on 6/18/2024 with SOB and hypercapnic respiratory failure. Found on bronchoscopy (6/19) to have RML obstruction by narrowing bronchus intermedius as well as copious mucopurulent secretions/mucus plugging. Sputum culture (6/18) is growing Stenotrophonomas pneumonia. BAL cultures (6/19) pending. Positive bronchoscopy galactomannan (6/19 1.87) Previously treated for Stenotrophonomas/aspergillus pneumonia in 11/2023 with subsequent sputum culture (2/2024) negative for both Stenotrophonomas/Aspergillus. Can consider repeat Stenotrophomonas infection vs opportunistic infection from colonized microbe. Lower suspicion for aspergillus pneumonia (has been on posaconazole chronically) but this is possible. With worsening respiratory status and increasing pressor needs on 6/21, broadened antibiotics to Meropenem and added levofloxacin as second agent to target Stenotrophomonas (per transplant ID).   - Transplant pulmonology consult, appreciate recs  - Transplant ID consult, appreciate recs  - Antibiotics   S/p vancomycin (6/18-6/20)   S/p zosyn (6/18- 6/21)  PTA posaconazole 300mg every day  (treatment dose)   Meropenem (6/21- present) in discussion with transplant ID    Discontinue levofloxacin per ID as Stenotrophomonas is resistant  Minocycline (6/20- present)      # Pyuria, Enterococcus faecium VRE and  ESBL E. Coli   Asymptomatic. With progressive IV pressor needs, obtained broad infectious workup which demonstrated UCx (6/19/2024) with Enterococcus faecium VR and ESBL E. Coli. Likely contributing for overall instability, for which we are treating. Also patient can be asymptomatic given immunosuppression.   - Antibiotics   Daptomycin (6/21 - present)                Meropenem (6/21 - present)    # Hx Aspergillus PNA (11/2023)  # Hx PJP PNA (1/2021)  # Hx CMV viremia, recurrent  # Hx EBV viremia  - Transplant ID consult, appreciate recs  PTA posaconazole (Treatment for hx of aspergillus PNA)  PTA azithromycin 250mg qd (CLAD ppx)  PTA bactrim (PJP ppx)     Hematology:    # Acute on Chronic Normocytic Anemia  # Thrombocytopenia, chronic  History of chronic anemia in the setting of kidney disease (baseline Hgb 10-11). Upon admission, Hgb 9. May be bone marrow suppression in the setting of chronic illness; potential contribution by blood loss with CRRT filter changes (~150-200c). Peripheral smear (6/18/2024) without evidence of schistocytes.  - Monitor CBC    Musculoskeletal:  - PT / OT consult     Skin:  - No acute concerns.     General Cares/Prophylaxis:    DVT Prophylaxis: Heparin SQ  GI Prophylaxis: PPI  Restraints: None  Family Communication:  at bedside  Code Status: Full Code     Lines/tubes/drains:  - PIV x2, midline  - RIJ  - A line  - NGT  - ETT    Disposition:  - Medical ICU     Patient seen and findings/plan discussed with medical ICU staff, Dr. Jean.    Edin Davis MD  Internal Medicine Resident, PGY 1        Clinically Significant Risk Factors              # Hypoalbuminemia: Lowest albumin = 2.2 g/dL at 6/21/2024  4:26 PM, will monitor as appropriate   # Thrombocytopenia:  Lowest platelets = 69 in last 2 days, will monitor for bleeding                 #Precipitous drop in Hgb/Hct: Lowest Hgb this hospitalization: 7.5 g/dL. Will continue to monitor and treat/transfuse as appropriate.         # Financial/Environmental Concerns: none            ====================================  INTERVAL HISTORY:   Patient with increased agitation and desaturations to high 70's/ low 80s overnight, with recovery to 90s without intervention. Patient continues to have low grade fevers intermittently. Per , patient more agitated and confused. Continuing gentle fluid removal with CRRT.     OBJECTIVE:   1. VITAL SIGNS:   Temp:  [98.4  F (36.9  C)-100.5  F (38.1  C)] 98.4  F (36.9  C)  Pulse:  [] 122  Resp:  [20-34] 28  MAP:  [57 mmHg-92 mmHg] 71 mmHg  Arterial Line BP: ()/(42-68) 128/50  FiO2 (%):  [40 %-60 %] 60 %  SpO2:  [73 %-100 %] 93 %  Vent Mode: CMV/AC  (Continuous Mandatory Ventilation/ Assist Control)  FiO2 (%): 60 %  Resp Rate (Set): 18 breaths/min  Tidal Volume (Set, mL): 320 mL  PEEP (cm H2O): 7 cmH2O  Resp: 28    2. INTAKE/ OUTPUT:   I/O last 3 completed shifts:  In: 2583.09 [I.V.:1342.59; NG/GT:550.5]  Out: 3227 [Other:3227]    3. PHYSICAL EXAMINATION:  General: Laying in bed, NAD   HEENT: Atraumatic, moist mucous membranes   Pulm/Resp: Coarse breath sounds throughout with decreased breath sounds at bilateral bases. Good air movement throughout.   CV: RRR, no m/r/g   Abdomen: Soft, non-distended, non-tender  Ext: No edema, pulses 2+ radial, pedal  Incisions/Skin: No rashes or lesions  Neuro: Sedated, arouses to voice, follows commands, moving all extremities    4. LABS:   Arterial Blood Gases   Recent Labs   Lab 06/24/24  1653 06/24/24  0335 06/23/24  1639 06/23/24  0906   PH 7.30* 7.37 7.33* 7.32*   PCO2 50* 41 46* 47*   PO2 80 123* 80 63*   HCO3 25 24 24 24     Complete Blood Count   Recent Labs   Lab 06/24/24  0335 06/23/24  0414 06/22/24  1615 06/22/24  0439   WBC 3.6*  5.4 6.0 6.0   HGB 7.5* 7.8* 8.0* 8.1*   PLT 69* 84* 89* 95*     Basic Metabolic Panel  Recent Labs   Lab 06/24/24  1614 06/24/24  1610 06/24/24  1247 06/24/24  0825 06/24/24  0335 06/23/24  1628 06/23/24  1626 06/23/24  0745 06/23/24  0414   NA  --  137  --   --  135  --  137  --  139   POTASSIUM  --  4.3  --   --  4.1  --  4.2  --  4.2   CHLORIDE  --  106  --   --  106  --  107  --  109*   CO2  --  22  --   --  23  --  23  --  22   BUN  --  38.0*  --   --  34.4*  --  29.7*  --  27.0*   CR  --  1.02*  --   --  1.02*  --  1.04*  --  1.15*   * 255* 244* 181* 207*   < > 202*   < > 172*  167*    < > = values in this interval not displayed.     Liver Function Tests  Recent Labs   Lab 06/24/24  1610 06/24/24  0335 06/23/24  1626 06/23/24  0414 06/22/24  1613 06/22/24  0439 06/21/24  1626 06/21/24  0356 06/18/24  1803 06/18/24  1418   AST  --  12  --  17  --  11  --  12   < >  --    ALT  --  18  --  20  --  17  --  20   < > 39   ALKPHOS  --  79  --  78  --  89  --  123   < > 237*   BILITOTAL  --  0.7  --  0.8  --  1.0  --  1.1   < > 0.6   ALBUMIN 2.5* 2.4* 2.4* 2.4*   < > 2.3*  2.3*   < > 2.4*  2.4*   < > 3.6   INR  --   --   --   --   --   --   --   --   --  0.98    < > = values in this interval not displayed.     Coagulation Profile  Recent Labs   Lab 06/18/24  1418   INR 0.98       5. RADIOLOGY:   Recent Results (from the past 24 hour(s))   XR Chest Port 1 View    Narrative    Exam: TEMPORARY, 6/24/2024 6:25 AM    Indication: Sepsis    Comparison: 6/23/2074    Findings:   Single frontal view of the chest. Endotracheal tube terminates in the  mid thoracic trachea. Right IJ central venous catheter tip is grossly  stable. Enteric tube courses inferiorly below the diaphragm with tip  out of the field of view. Right bronchial stent. Trachea is midline.  Cardiac silhouette is grossly stable. Improved aeration in the left  lung with persistent opacities throughout the left lung and right lung  base. Probable small  bilateral pleural effusions.      Impression    Impression:   1. Overall improved aeration in the left lung with persistent  opacities in the left lung and right lung base, likely improving  pulmonary edema or infection.  2. Stable support devices.    I have personally reviewed the examination and initial interpretation  and I agree with the findings.    CARMELINA VILLALOBOS MD         SYSTEM ID:  [id number]

## 2024-06-24 NOTE — PROGRESS NOTES
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 Nephrology consulted for management of HD while admitted.      Interval History :   Mrs Blue continues on CRRT, was weaned off of pressors this am.  Will keep CRRT modality today but will contemplate going to iHD in coming days based on her trajectory. Trying to pull ~1L net negative today, checking labs BID as they have been stable.      Assessment & Recommendations:   ESRD-ESKD: pt dialyzes MWF at [facility] (ph [phone], f [phone]) with Dr. Glasgow. Run time: 3.5 hrs. EDW 59.7 kg. Access: L AVF. +heparin 1,500u bolus.                  -Appreciate team placing tri-alysis line to run norepi and will plan for CRRT, 4k baths, 0-50cc/h as able today, 500u heparin/h through circuit.                -No need for new dialysis consent, continuing long term HD for ESRD.                -L AVF with remote hx of needing intervention, no issue recently.      Outpt Rx:       Volume-About at EDW, no edema.  Will see how I/O's and hemodynamics trend.       Pulm-Admitted with hypercapnic resp failure, suspected PNA. ID involved, getting bronch.  Currently on Zosyn, bactrim, Posaconazole, Azithromycin and Vanco x1 dose so far.       Electrolytes-Stable on CRRT, K 4.1, bicarb 23 with low level lactate but pH 7.37.       BMD-Mg and Phos running low, can replace as CRRT will further clear Mg/Phos.      Anemia-Hgb 7.5 and drifting, on venofer 50mg weekly but will hold while treating for infection. On Mircera 60mcg [id number], will cover with short term Epo while admitted when stable enough for HD.      "  Nutrition-Novasource renal started      Time spent: 50 minutes on this date of encounter for chart review, physical exam, medical decision making and co-ordination of care.      Seen and discussed with Dr Vazquez     Recommendations were communicated to primary team via verbal communication.     ADRIÁN Lo CNS  Clinical Nurse Specialist  [phone]    Review of Systems:   I reviewed the following systems:  ROS not done due to vent/sedation    Physical Exam:   I/O last 3 completed shifts:  In: 2536.9 [I.V.:1346.4; NG/GT:500.5]  Out: 3368 [Other:3368]   BP (!) 77/46   Pulse (!) 137   Temp 98.8  F (37.1  C) (Axillary)   Resp 24   Ht 1.6 m (5' 3\")   Wt 56.7 kg (125 lb)   LMP 06/07/2014 (Exact Date)   SpO2 94%   BMI 22.14 kg/m       GENERAL APPEARANCE: Intubated but alert.    EYES: No scleral icterus  Pulmonary: lungs Rhonchi to auscultation with equal breath sounds bilaterally  CV: Regular rhythm, normal rate   - Edema none  GI: soft, nontender, normal bowel sounds  MS: no evidence of inflammation in joints, no muscle tenderness  : No Basilio  SKIN: no rash, warm, dry  NEURO: No focal deficits    Labs:   All labs reviewed by me  Electrolytes/Renal -   Recent Labs   Lab Test 06/24/24  0825 06/24/24  0335 06/24/24  0334 06/23/24  1628 06/23/24  1626 06/23/24  0745 06/23/24  0414   NA  --  135  --   --  137  --  139   POTASSIUM  --  4.1  --   --  4.2  --  4.2   CHLORIDE  --  106  --   --  107  --  109*   CO2  --  23  --   --  23  --  22   BUN  --  34.4*  --   --  29.7*  --  27.0*   CR  --  1.02*  --   --  1.04*  --  1.15*   * 207* 208*   < > 202*   < > 172*  167*   CHELSEY  --  8.1*  --   --  8.1*  --  8.0*   MAG  --  2.4*  --   --  2.3  --  2.3   PHOS  --  3.3  --   --  3.4  --  3.5    < > = values in this interval not displayed.       CBC -   Recent Labs   Lab Test 06/24/24  0335 06/23/24  0414 06/22/24  1615   WBC 3.6* 5.4 6.0   HGB 7.5* 7.8* 8.0*   PLT 69* 84* 89*       LFTs -   Recent Labs "   Lab Test 06/24/24  0335 06/23/24  1626 06/23/24  0414 06/22/24  1613 06/22/24  0439   ALKPHOS 79  --  78  --  89   BILITOTAL 0.7  --  0.8  --  1.0   ALT 18  --  20  --  17   AST 12  --  17  --  11   PROTTOTAL 4.7*  --  4.7*  --  4.6*   ALBUMIN 2.4* 2.4* 2.4*   < > 2.3*  2.3*    < > = values in this interval not displayed.       Iron Panel -   Recent Labs   Lab Test 02/03/21  0415 12/13/18  1033 08/01/18  0921   IRON 51 16* 93   IRONSAT 36 7* 37   YOLA  --  302* 571*           Current Medications:  Current Facility-Administered Medications   Medication Dose Route Frequency Provider Last Rate Last Admin    acetylcysteine (MUCOMYST) 10 % nebulizer solution 4 mL  4 mL Inhalation BID Velez Reyes, German, MD   4 mL at 06/24/24 0849    azithromycin (ZITHROMAX) tablet 250 mg  250 mg Oral or Feeding Tube Daily Velez Reyes, German, MD   250 mg at 06/24/24 0759    B and C vitamin Complex with folic acid (NEPHRONEX) liquid 5 mL  5 mL Per Feeding Tube Daily Awa Watt MD   5 mL at 06/24/24 0800    calcium carbonate (TUMS) chewable tablet 500 mg  500 mg Oral Once per day on Sunday Tuesday Thursday Saturday Josesito Pratt MD   500 mg at 06/23/24 0748    chlorhexidine (PERIDEX) 0.12 % solution 15 mL  15 mL Mouth/Throat Q12H Chio Cortez MD   15 mL at 06/24/24 0759    DAPTOmycin (CUBICIN) 600 mg in sodium chloride 0.9 % 100 mL intermittent infusion  12 mg/kg (Dosing Weight) Intravenous Q24H Lissett Lewis MD        fiber modular (NUTRISOURCE FIBER) packet 1 packet  1 packet Per Feeding Tube Daily Awa Watt MD   1 packet at 06/24/24 0809    fludrocortisone (FLORINEF) tablet 0.1 mg  0.1 mg Oral or Feeding Tube Daily [place] vel[place] APRN CNP   0.1 mg at 06/24/24 0759    hydrocortisone sodium succinate PF (solu-CORTEF) injection 50 mg  50 mg Intravenous Q6H Evelyn Roberts APRN CNP   50 mg at 06/24/24 1034    insulin aspart (NovoLOG) injection (RAPID ACTING)  1-12 Units Subcutaneous Q4H Adilia Hunt,  MD   2 Units at 06/24/24 0842    levalbuterol (XOPENEX) neb solution 0.63 mg  0.63 mg Nebulization 4x Daily Gareth Mosquera MD   0.63 mg at 06/24/24 0849    [Held by provider] magnesium chloride CR tablet 1,070 mg  1,070 mg Oral Once per day on Sunday Tuesday Thursday Saturday Josesito Pratt MD        melatonin tablet 3 mg  3 mg Oral or Feeding Tube At Bedtime Velez Reyes, German, MD        meropenem (MERREM) 1 g vial to attach to  mL bag  1 g Intravenous Q12H Velez Reyes, German, MD   1 g at 06/24/24 0444    [Held by provider] metoprolol tartrate (LOPRESSOR) tablet 25 mg  25 mg Oral BID Velez Reyes, German, MD        minocycline (MINOCIN) 100 mg in sodium chloride 0.9 % 100 mL intermittent infusion  100 mg Intravenous Q12H Kajal Chong APRN  mL/hr at 06/24/24 0002 100 mg at 06/24/24 0002    montelukast (SINGULAIR) tablet 10 mg  10 mg Oral At Bedtime Velez Reyes, German, MD   10 mg at 06/23/24 2210    pantoprazole (PROTONIX) 2 mg/mL suspension 40 mg  40 mg Per Feeding Tube Daily Josesito Pratt MD   40 mg at 06/23/24 1142    posaconazole (NOXAFIL) DR tablet TBEC 300 mg  300 mg Per Feeding Tube Daily Josesito Pratt MD   300 mg at 06/23/24 1119    [Held by provider] predniSONE (DELTASONE) tablet 5 mg  5 mg Oral Daily Velez Reyes, German, MD   5 mg at 06/21/24 0822    protein modular (PROSOURCE TF20) packet 1 packet  1 packet Per Feeding Tube BID Awa Watt MD   1 packet at 06/24/24 0809    QUEtiapine (SEROquel) tablet 100 mg  100 mg Oral At Bedtime Velez Reyes, German, MD        QUEtiapine (SEROquel) tablet 50 mg  50 mg Oral or Feeding Tube BID Awa Watt MD   50 mg at 06/24/24 0800    sodium chloride (NEBUSAL) 3 % neb solution 3 mL  3 mL Nebulization BID Velez Reyes, German, MD   3 mL at 06/23/24 2159    sodium chloride (PF) 0.9% PF flush 10 mL  10 mL Intracatheter Q8H Kajal Chong APRN CNP   10 mL at 06/23/24 1619    sodium chloride (PF) 0.9% PF flush 10 mL  10 mL Intracatheter Q8H  Kajal Chong APRN CNP   10 mL at 06/24/24 0010    sulfamethoxazole-trimethoprim (BACTRIM) 400-80 MG per tablet 1 tablet  1 tablet Oral or Feeding Tube Daily Josesito Pratt MD   1 tablet at 06/23/24 1951    tacrolimus (GENERIC) suspension 0.4 mg  0.4 mg Oral or Feeding Tube QAM Yenni Graham MD   0.4 mg at 06/24/24 0800    And    tacrolimus (GENERIC) suspension 0.4 mg  0.4 mg Oral or Feeding Tube QPM Yenni Graham MD   0.4 mg at 06/23/24 1729    Vitamin D3 (CHOLECALCIFEROL) tablet 50 mcg  50 mcg Oral Once per day on Monday Wednesday Friday Velez Reyes, German, MD   50 mcg at 06/24/24 0759     Current Facility-Administered Medications   Medication Dose Route Frequency Provider Last Rate Last Admin    dexmedeTOMIDine (PRECEDEX) 4 mcg/mL in sodium chloride 0.9 % 100 mL infusion  0.1-1.2 mcg/kg/hr (Dosing Weight) Intravenous Continuous Chio Cortez MD 15.2 mL/hr at 06/24/24 1100 1.2 mcg/kg/hr at 06/24/24 1100    dextrose 10% infusion   Intravenous Continuous PRN Awa Watt MD        dialysate for CVVHD & CVVHDF (Phoxillum BK4/2.5)  12.5 mL/kg/hr CRRT Continuous Jonna Rodas  mL/hr at 06/24/24 0809 12.5 mL/kg/hr at 06/24/24 0809    fentaNYL (SUBLIMAZE) infusion   mcg/hr Intravenous Continuous Velez Reyes, German, MD 2 mL/hr at 06/24/24 1100 100 mcg/hr at 06/24/24 1100    heparin (porcine) 20,000 units in 20 mL ANTICOAGULANT infusion (syringe from pharmacy)  500 Units/hr CRRT Continuous Hardy Tran APRN CNS 0.5 mL/hr at 06/24/24 1000 500 Units/hr at 06/24/24 1000    norepinephrine (LEVOPHED) 16 mg in  mL infusion MAX CONC CENTRAL LINE  0.01-0.6 mcg/kg/min (Dosing Weight) Intravenous Continuous Kajal Chong APRN CNP   Stopped at 06/24/24 0900    POST-filter replacement solution for CVVHD & CVVHDF (Phoxillum BK4/2.5)   CRRT Continuous Jonna Rodas  mL/hr at 06/23/24 1633 New Bag at 06/23/24 1633    PRE-filter replacement solution for CVVHD & CVVHDF  (Phoxillum BK4/2.5)  12.5 mL/kg/hr CRRT Continuous Jonna Rodas  mL/hr at 06/24/24 0810 12.5 mL/kg/hr at 06/24/24 0810    vasopressin 1 unit/mL MAX Conc (PITRESSIN) infusion  1.8 Units/hr Intravenous Continuous Velez Reyes, German, MD 1.8 mL/hr at 06/24/24 1100 1.8 Units/hr at 06/24/24 1100

## 2024-06-24 NOTE — PROGRESS NOTES
[facility]    Transplant Infectious Diseases Inpatient Progress note      Kecia Blue MRN# [id number]   YOB: 1960 Age: 63 year old   Date of Admission: 6/18/2024  1:47 PM  Transplant: 3/1/2018 (Lung), Postoperative day: 2307            Recommendations:   Please change meropenem to ceftazidime, equivalent to 2 gm q8 hr but renally dosed.   Continue minocycline.   Please change daptomycin to linezolid 600 mg PO or IV q12 hr.   Add micafungin 150 mg daily.   Continue posaconazole.         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         Active Problems and Infectious Diseases Issues:     Acute hypercapnic and hypoxic respiratory failure, intubated 6/18/24  Multilobar pneumonia, right  Right mainstem bronchial stenosis requiring dilations (dilated 2/15/24, s/p 6/20 dilation with stent placement)  Airways colonized with E faecalis, E faecium, S maltophilia, and now filamentous fungus.   It is difficult to ascertain which of these pathogens is/are responsible for pneumonia. Will treat as above.   It is possible that the bronchoscopy with dilation stirred the pathogens colonizing the airways and resulted in septic shock.     Pyuria and bacteriuria in HD patient.   The significance of these pathogens in this HD patient is not clear.   Due to septic shock  we are treating. We can change from daptomycin to linezolid to also cover the E faecium in the lungs and from meropenem to ceftazidime since E coli is not ESBL.    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   Now with positive A galactomannan in one/two BALs with filamentous fungus growing while on therapeutic posaconazole.   Will add micafungin pending identification.          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 After treatment and stopping of valcyte, recurrence of CMV viremia to 1k in 02/2024, resumed valcyte ppx (renally dosed) and negative since 2/28/24. Valcyte again held in March 2024, with repeats negative to date. CMV negative 6/19/24.     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 Decreased to 135k 2/2022. Neg 6/27/22.   Increased to 1 million in 11/2023 s/p 1 dose of rituximab and decreasing/undetected in 05/2024. Negative EBV 6/19/24.    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 BAL showed septate hyphae. 7/2020 had hypodense mass in left lung with biopsy showing fungal hyphae, cx aspergillus. Started on voriconazole in 2019, changed to posaconazole in 2020, she remains on 300 mg daily.  - 1/2021 BAL cx with steno: tx ceftazidime for 2 weeks  - 1/2021-2/2021 - ARDS due to PJP pneumonia (had stopped TMP-SMX due to side effects). Recovery from this required tracheostomy.  - 2019 - She had also grown Actinomyces from multiple BAL    Other Infectious Disease issues include:  - QTc interval:  450 msec on 6/18/24  - Bacterial prophylaxis:  azithromycin per Tx pulm  - Pneumocystis prophylaxis:  Bactrim for life (hx PJP)  - Viral serostatus & prophylaxis: CMV D+/R+, EBV D+/R+   - Fungal prophylaxis:  posaconazole  - Immunization status:  Seasonal influenza, RSV, and COVID vaccine UTD  - Gamma globulin  status:  979 on 10/27/23  - Isolation status: contact for VRE      Attestation:  Total duration of visit including chart review, reviewing labs and imaging, interviewing and examining the patient, documentation, and sending communication to the patient and to the primary treating team, all at the same day of this encounter, is: 55 minutes.    Adrianna Zamarripa MD  [facility]  Contact information available via [facility] Paging/Directory or [employer] ([employer])    06/24/2024      Interim History and Events:   Intubated, not as interactive as before. Still on pressors and still febrile.     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      History obtained from chart review, patient's , and patient. Reported increased lethargy reported for 2-3 days PTA, poor PO intake, more tired, and weak cough. Unable to bring up sputum. Denied fever, chills, urinary symptoms (on HD), diarrhea, abdominal pain at home. Her and  both reported sinus congestion recently,  improved quickly and Kecia declined. Denies sore throat, headache. Afebrile and without leukocytosis on admission. WBC 4.0, ESR 10, lactic 0.6, and CRP ~8. Put on BIPAP given respiratory acidosis with some improvement, however unable to tolerate off BIPAP and intubated for failure to improve hypercapnia. Started on azithromycin, vancomycin, and zosyn. Negative: RVP, flu/covid/rsv, MRSA nares,  Cryptococcus antigen. Pending: EBV, CMV, blood culture, fungitell, Histo Ag, AGM, and sputum culture     CT chest-hi res on 6/18/24 notable for slight decreased confluent consolidative opacities in right lower lobe with more micronodular component of possible infection in the right lower lobe. Other abnormalities throughout the lungs similar to February of the transplanted lungs - pleural effusions, anastomoses intact with narrowing of R mainstem anastomosis. New narrowing of right bronchus intermedius with occlusion of RML bronchus (mucus plug per pulm). Febrile 100.7F following intubation, WBC 3.2, CRP up to 19, procal 0.24 in ESRD, nl lactic. Remains intubated, though on PS. On pressor x1 norepi, low dose 0.05. Nursing reports lots of secretion burden and weak cough. Planning for bronchoscopy today.     Has previously scheduled stent placement on 6/26/24 as outpatient.     ROS:  As mentioned in the interim history otherwise negative by reviewing constitutional symptoms, central and peripheral neurological systems, respiratory system, cardiac system, GI system,  system, musculoskeletal, skin, allergy, and lymphatics.                 Physical Examination:  Temp: 98.4  F (36.9  C) Temp src: Skin   Pulse: (!) 122   Resp: 28 SpO2: 93 % O2 Device: Mechanical Ventilator    Vitals:    06/20/24 2200 06/21/24 1700 06/22/24 0200 06/23/24 0500   Weight: 56.2 kg (123 lb 14.4 oz) 57.6 kg (126 lb 15.8 oz) 57.6 kg (126 lb 15.8 oz) 57.7 kg (127 lb 3.3 oz)    06/24/24 0400   Weight: 56.7 kg (125 lb)     Constitutional: awake but confused, orally intubated.  Lungs: CTA bilaterally anteriorly.   CVS: RRR, normal S1/S2,      Medications:  Medications that Require Transfusion:   Current Facility-Administered Medications   Medication Dose Route Frequency Provider Last Rate Last Admin    dexmedeTOMIDine (PRECEDEX) 4 mcg/mL in sodium chloride 0.9 % 100 mL infusion  0.1-1.2 mcg/kg/hr (Dosing Weight) Intravenous Continuous Mutirangura,  MD Chio 15.2 mL/hr at 06/24/24 1700 1.2 mcg/kg/hr at 06/24/24 1700    dextrose 10% infusion   Intravenous Continuous PRN Awa Watt MD        dialysate for CVVHD & CVVHDF (Phoxillum BK4/2.5)  12.5 mL/kg/hr CRRT Continuous Jonna Rodas  mL/hr at 06/24/24 1528 12.5 mL/kg/hr at 06/24/24 1528    fentaNYL (SUBLIMAZE) infusion   mcg/hr Intravenous Continuous Velez Reyes, German, MD 2 mL/hr at 06/24/24 1700 100 mcg/hr at 06/24/24 1700    heparin (porcine) 20,000 units in 20 mL ANTICOAGULANT infusion (syringe from pharmacy)  500 Units/hr CRRT Continuous Hadry Tran APRN CNS 0.5 mL/hr at 06/24/24 1700 500 Units/hr at 06/24/24 1700    norepinephrine (LEVOPHED) 16 mg in  mL infusion MAX CONC CENTRAL LINE  0.01-0.6 mcg/kg/min (Dosing Weight) Intravenous Continuous Kajal Chong APRN CNP   Stopped at 06/24/24 1400    POST-filter replacement solution for CVVHD & CVVHDF (Phoxillum BK4/2.5)   CRRT Continuous Jonna Rodas  mL/hr at 06/23/24 1633 New Bag at 06/23/24 1633    PRE-filter replacement solution for CVVHD & CVVHDF (Phoxillum BK4/2.5)  12.5 mL/kg/hr CRRT Continuous Jonna Rodas  mL/hr at 06/24/24 1528 12.5 mL/kg/hr at 06/24/24 1528    vasopressin 1 unit/mL MAX Conc (PITRESSIN) infusion  1.8 Units/hr Intravenous Continuous Velez Reyes, German, MD 1.8 mL/hr at 06/24/24 1704 1.8 Units/hr at 06/24/24 1704     Scheduled Medications:   Current Facility-Administered Medications   Medication Dose Route Frequency Provider Last Rate Last Admin    acetylcysteine (MUCOMYST) 10 % nebulizer solution 4 mL  4 mL Inhalation BID Velez Reyes, German, MD   4 mL at 06/24/24 1520    azithromycin (ZITHROMAX) tablet 250 mg  250 mg Oral or Feeding Tube Daily Velez Reyes, German, MD   250 mg at 06/24/24 0759    B and C vitamin Complex with folic acid (NEPHRONEX) liquid 5 mL  5 mL Per Feeding Tube Daily Awa Watt MD   5 mL at 06/24/24 0800    calcium carbonate (TUMS) chewable tablet 500 mg   500 mg Oral Once per day on Sunday Tuesday Thursday Saturday Josesito Pratt MD   500 mg at 06/23/24 0748    chlorhexidine (PERIDEX) 0.12 % solution 15 mL  15 mL Mouth/Throat Q12H Chio Cortez MD   15 mL at 06/24/24 0759    DAPTOmycin (CUBICIN) 600 mg in sodium chloride 0.9 % 100 mL intermittent infusion  12 mg/kg (Dosing Weight) Intravenous Q24H Lissett Lewis MD   600 mg at 06/24/24 1416    fiber modular (NUTRISOURCE FIBER) packet 1 packet  1 packet Per Feeding Tube Daily Awa Watt MD   1 packet at 06/24/24 0809    fludrocortisone (FLORINEF) tablet 0.1 mg  0.1 mg Oral or Feeding Tube Daily Evelyn Roberts APRN CNP   0.1 mg at 06/24/24 0759    hydrocortisone sodium succinate PF (solu-CORTEF) injection 50 mg  50 mg Intravenous Q6H Evelyn Roberts APRN CNP   50 mg at 06/24/24 1609    insulin aspart (NovoLOG) injection (RAPID ACTING)  1-12 Units Subcutaneous Q4H Adilia Hunt MD   5 Units at 06/24/24 1617    levalbuterol (XOPENEX) neb solution 0.63 mg  0.63 mg Nebulization 4x Daily Gareth Mosquera MD   0.63 mg at 06/24/24 1520    [Held by provider] magnesium chloride CR tablet 1,070 mg  1,070 mg Oral Once per day on Sunday Tuesday Thursday Saturday Josesito Pratt MD        melatonin tablet 3 mg  3 mg Oral or Feeding Tube At Bedtime Velez Reyes, German, MD        meropenem (MERREM) 1 g vial to attach to  mL bag  1 g Intravenous Q12H Velez Reyes, German, MD   1 g at 06/24/24 1642    [Held by provider] metoprolol tartrate (LOPRESSOR) tablet 25 mg  25 mg Oral BID Velez Reyes, German, MD        minocycline (MINOCIN) 100 mg in sodium chloride 0.9 % 100 mL intermittent infusion  100 mg Intravenous Q12H Arlette, Kajal, APRN  mL/hr at 06/24/24 1214 100 mg at 06/24/24 1214    montelukast (SINGULAIR) tablet 10 mg  10 mg Oral At Bedtime Velez Reyes, German, MD   10 mg at 06/23/24 2210    pantoprazole (PROTONIX) 2 mg/mL suspension 40 mg  40 mg Per Feeding Tube Daily Santa  "MD Josesito   40 mg at 06/24/24 1215    posaconazole (NOXAFIL) DR tablet TBEC 300 mg  300 mg Per Feeding Tube Daily Josesito Pratt MD   300 mg at 06/24/24 1214    [Held by provider] predniSONE (DELTASONE) tablet 5 mg  5 mg Oral Daily Velez Reyes, German, MD   5 mg at 06/21/24 0822    protein modular (PROSOURCE TF20) packet 1 packet  1 packet Per Feeding Tube BID Awa Watt MD   1 packet at 06/24/24 0809    QUEtiapine (SEROquel) tablet 100 mg  100 mg Oral or Feeding Tube At Bedtime Velez Reyes, German, MD        QUEtiapine (SEROquel) tablet 50 mg  50 mg Oral or Feeding Tube BID Awa Watt MD   50 mg at 06/24/24 0800    sodium chloride (NEBUSAL) 3 % neb solution 3 mL  3 mL Nebulization BID Velez Reyes, German, MD   3 mL at 06/24/24 1226    sodium chloride (PF) 0.9% PF flush 10 mL  10 mL Intracatheter Q8H Kajal Chong, APRN CNP   10 mL at 06/23/24 1619    sodium chloride (PF) 0.9% PF flush 10 mL  10 mL Intracatheter Q8H Kajal Chong, APRN CNP   10 mL at 06/24/24 0010    sulfamethoxazole-trimethoprim (BACTRIM) 400-80 MG per tablet 1 tablet  1 tablet Oral or Feeding Tube Daily Josesito Pratt MD   1 tablet at 06/23/24 1951    tacrolimus (GENERIC) suspension 0.4 mg  0.4 mg Oral or Feeding Tube QAM eYnni Graham MD   0.4 mg at 06/24/24 0800    And    tacrolimus (GENERIC) suspension 0.4 mg  0.4 mg Oral or Feeding Tube QPM Yenni Graham MD   0.4 mg at 06/23/24 1729    valGANciclovir (VALCYTE) solution 450 mg  450 mg Per Feeding Tube Daily Rufina Huff, CNP   450 mg at 06/24/24 1417    Vitamin D3 (CHOLECALCIFEROL) tablet 50 mcg  50 mcg Oral Once per day on Monday Wednesday Friday Velez Reyes, German, MD   50 mcg at 06/24/24 0759         Laboratory Data:   No results found for: \"ACD4\"    Inflammatory Markers    Recent Labs   Lab Test 06/18/24  1418 10/29/23  0642 10/28/23  0725 10/07/19  1221 10/06/19  1444 09/13/19  0934 09/11/19  2325 08/22/19  0820   SED 10 66* 58* 93*  --  111* 102*  --    CRP "  --   --   --   --  25.0* 58.0* 66.0* 47.0*       Immune Globulin Studies     Recent Labs   Lab Test 06/19/24  0839 10/27/23  0701 10/24/23  1033 09/15/21  0915 01/31/21  0441 01/25/21  0406 10/27/19  0621 03/01/18  0356 02/19/18  0759    979  --  1,198 763  --  936 698 1,790*   IGM  --   --   --   --   --   --   --   --  502*   IGE  --   --  <2  --   --  <2  --   --  <2   IGA  --   --   --   --   --   --   --   --  425*   IGG1  --   --   --   --   --   --   --   --  1,300*   IGG2  --   --   --   --   --   --   --   --  131*   IGG3  --   --   --   --   --   --   --   --  101   IGG4  --   --   --   --   --   --   --   --  1*       Metabolic Studies       Recent Labs   Lab Test 06/24/24  1614 06/24/24  1610 06/24/24  1247 06/24/24  0825 06/24/24  0335 06/24/24  0334 06/24/24  0013 06/23/24 2006 06/23/24  1628 06/23/24  1626 06/23/24  0745 06/23/24  0414 06/22/24  1614 06/22/24  1613 06/22/24  0441 06/22/24  0439 06/21/24  1208 06/21/24  0356 06/27/22  1013 05/26/22  0750   NA  --  137  --   --  135  --   --   --   --  137  --  139  --  139  --  137  137   < > 134*  134*   < > 137   POTASSIUM  --  4.3  --   --  4.1  --   --   --   --  4.2  --  4.2  --  3.9  --  3.9  3.9   < > 3.6  3.6   < > 3.5   CHLORIDE  --  106  --   --  106  --   --   --   --  107  --  109*  --  109*  --  106  106   < > 103  103   < > 96   CO2  --  22  --   --  23  --   --   --   --  23  --  22  --  21*  --  21*  21*   < > 20*  20*   < > 32   ANIONGAP  --  9  --   --  6*  --   --   --   --  7  --  8  --  9  --  10  10   < > 11  11   < > 9   BUN  --  38.0*  --   --  34.4*  --   --   --   --  29.7*  --  27.0*  --  28.0*  --  29.0*  29.0*   < > 32.3*  32.3*   < > 42*   CR  --  1.02*  --   --  1.02*  --   --   --   --  1.04*  --  1.15*  --  1.39*  --  1.67*  1.67*   < > 2.72*  2.72*   < > 3.98*   GFRESTIMATED  --  62  --   --  62  --   --   --   --  61  --  54*  --  43*  --  34*  34*   < > 19*  19*   < > 12*   * 255* 244*  181* 207* 208*   < >  --    < > 202*   < > 172*  167*   < > 185*   < > 180*  180*   < > 158*  155*  155*   < > 110*   A1C  --   --   --   --   --   --   --   --   --   --   --   --   --   --   --   --   --   --   --  5.2   CHELSEY  --  8.0*  --   --  8.1*  --   --   --   --  8.1*  --  8.0*  --  7.9*  --  8.0*  8.0*   < > 7.9*  7.9*   < > 9.5   PHOS  --  4.1  --   --  3.3  --   --   --   --  3.4  --  3.5  --  2.4*  --  2.5   < > 2.6   < >  --    MAG  --  2.4*  --   --  2.4*  --   --   --   --  2.3  --  2.3  --  2.4*  --  2.3   < > 2.2   < > 1.8   LACT  --   --   --   --   --   --   --  0.9  --  0.7   < >  --   --   --   --   --    < >  --    < >  --    CKT  --   --   --   --  24*  --   --   --   --   --   --   --   --   --   --   --   --  23*   < >  --     < > = values in this interval not displayed.       Hepatic Studies    Recent Labs   Lab Test 06/24/24  1610 06/24/24  0335 06/23/24  1626 06/23/24  0414 06/22/24  1613 06/22/24  0439 06/21/24  1626 06/21/24  0356 06/20/24  1506 06/20/24  0420 06/19/24  0613 06/18/24  1803 09/15/21  0915 05/01/21  0654 01/27/21  0414 01/26/21  0425   BILITOTAL  --  0.7  --  0.8  --  1.0  --  1.1  --  0.9 0.8 0.6   < >  --    < > 0.8   ALKPHOS  --  79  --  78  --  89  --  123  --  153* 175* 235*   < >  --    < > 184*   ALBUMIN 2.5* 2.4* 2.4* 2.4* 2.3* 2.3*  2.3*   < > 2.4*  2.4*   < > 2.7* 2.7* 3.6   < >  --    < > 2.0*   AST  --  12  --  17  --  11  --  12  --  26 31 39   < >  --    < > 11   ALT  --  18  --  20  --  17  --  20  --  23 28 39   < >  --    < > 30   LDH  --   --   --   --   --   --   --   --   --   --   --   --   --  164  --  187   GGT  --   --   --   --   --   --   --   --   --   --   --  147*  --   --   --   --     < > = values in this interval not displayed.       Pancreatitis testing    Recent Labs   Lab Test 06/18/24  1803 10/25/23  1356 05/26/22  0750 09/15/21  0915 01/24/21  0000 02/19/20  0713 12/31/19  1033 10/24/19  2032 10/21/19  1451 10/06/19  1444  "09/11/19  2325 03/04/18  0353 02/19/18  0759   AMYLASE  --   --   --   --   --   --   --   --   --   --   --   --  58   LIPASE 40 24  --   --   --   --   --  212 119 172 127  --   --    TRIG  --   --  155* 68 242* 77 98  --   --   --   --  191* 115       Hematology Studies      Recent Labs   Lab Test 06/24/24  0335 06/23/24  0414 06/22/24  1615 06/22/24  0439 06/22/24  0034 06/21/24  0356 06/20/24  0420 06/19/24  2359   WBC 3.6* 5.4 6.0 6.0 6.4 5.8 6.0 5.5   ANEU 2.8 4.6  --  5.2  --  5.1 5.3 4.8   ALYM 0.3* 0.1*  --  0.0*  --  0.1* 0.1* 0.1*   SHERRI 0.4 0.4  --  0.7  --  0.5 0.5 0.4   AEOS 0.0 0.2  --  0.1  --  0.1 0.1 0.1   HGB 7.5* 7.8* 8.0* 8.1* 8.4* 9.6* 10.4* 10.1*   HCT 23.7* 24.9* 25.3* 25.9* 27.2* 29.8* 32.2* 31.1*   PLT 69* 84* 89* 95* 84* 98* 109* 102*       Arterial Blood Gas Testing    Recent Labs   Lab Test 06/24/24  1653 06/24/24  0335 06/23/24  1639 06/23/24  1143 06/23/24  0906 06/23/24  0640   PH 7.30* 7.37 7.33*  --  7.32* 7.37   PCO2 50* 41 46*  --  47* 43   PO2 80 123* 80  --  63* 80   HCO3 25 24 24  --  24 25   O2PER 60 50 50 60 50  50 60        Urine Studies     Recent Labs   Lab Test 06/19/24  1214 01/24/21  1729 10/21/19  2240 09/12/19  0125 08/07/19  1512   URINEPH 6.5 5.0 5.0 7.5* 7.0   NITRITE Negative Negative Negative Negative Negative   LEUKEST Large* Moderate* Large* Negative Trace*   WBCU >182* 34* 115* 3 19*       Vancomycin Levels     Recent Labs   Lab Test 06/19/24  0613 10/26/23  0606 09/21/21  1911 01/28/21  0412 01/26/21  0425 01/25/21  1259   VANCOMYCIN 15.6 18.6 18.8 21.6 31.4* 15.1       Tobramycin levels     No lab results found.    Gentamicin levels    No lab results found.    Tacrolimus levels    Invalid input(s): \"TACROLIMUS\", \"TAC\", \"TACR\"      Latest Ref Rng & Units 6/24/2024     4:10 PM 6/24/2024     3:35 AM 6/23/2024     4:26 PM 6/23/2024     4:14 AM 6/22/2024     4:15 PM   Transplant Immunosuppression Labs   Creat 0.51 - 0.95 mg/dL 1.02  1.02  1.04  1.15     Urea " "Nitrogen 8.0 - 23.0 mg/dL 38.0  34.4  29.7  27.0     WBC 4.0 - 11.0 10e3/uL  3.6   5.4  6.0    Neutrophil %  78   85     ANEU 1.6 - 8.3 10e3/uL  2.8   4.6         Cyclosporine levels    Invalid input(s): \"CYCLOSPORINE\", \"CYC\"    Mycophenolate levels    Invalid input(s): \"MYPA\", \"MYP\"    Sirolimus levels    Invalid input(s): \"SIROLIMUS\", \"SIR\", \"RAPA\"    CSF testing   No lab results found.      Microbiology:  6/19  CrAg - negative  Urine Strep and Legionella antigens - negative  Urine culture - GNB  BAL - negative KOH, pending aerobic + fungal + AFB cultures    6/18  Negative RVP and COVID/flu/RSV  Histoplasma antigen (blood) - pending  Aspergillus galactomannan - negative  Fungitell - negative  Blood cultures x2 - NGTD    Sputum culture - Stenotrophomonas maltophilia      Fungal testing  Recent Labs   Lab Test 06/21/24  1706 06/19/24  1513 06/18/24  1803 10/07/19  1544 09/14/19  1625   FGTL  --   --  <31   < > 122   FGTLI  --   --  Negative   < > Positive*   PJRDFA  --  Not Detected  --    < >  --    ASPGAI 0.06 1.87 0.03   < > 1.08   ASPAG Negative Positive*  --    < >  --    ASPGAA  --   --  Negative   < > Positive*   CIABB  --   --   --   --  <1:2   COFUNG  --   --  <1:2  --   --     < > = values in this interval not displayed.       Last Culture results   S Pneumoniae Antigen   Date Value Ref Range Status   01/24/2021   Final    Negative, no Streptococcus pneumoniae antigen detected by immunochromatographic membrane   assay. A negative Streptococcus pneumoniae antigen result does not rule out infection with   Streptococcus pneumoniae.       Streptococcus pneumoniae antigen   Date Value Ref Range Status   06/19/2024 Negative Negative Final     Comment:     A negative Streptococcus pneumoniae antigen result does not rule out infection with Streptococcus pneumoniae.     P. jirovecii By PCR   Date Value Ref Range Status   06/19/2024 Not Detected  Final     Comment:       NOT DETECTED - A negative result does not " rule out the   presence of PCR inhibitors in the patient specimen or assay   specific nucleic acid in concentrations below the level of   detection by the assay.    This test was developed and its performance characteristics   determined by [facility]. It has not been cleared or   approved by the US Food and Drug Administration. This test   was performed in a CLIA certified laboratory and is   intended for clinical purposes.  Performed By: [place] [address]  : Rogelio Llanos MD, PhD  CLIA Number: [id number]   10/17/2023 Not Detected  Final     Comment:     NOT DETECTED - A negative result does not rule out the   presence of PCR inhibitors in the patient specimen or   assay specific nucleic acid in concentrations below the   level of detection by the assay.    This test was developed and its performance characteristics   determined by [facility]. It has not been cleared or   approved by the US Food and Drug Administration. This test   was performed in a CLIA certified laboratory and is   intended for clinical purposes.  Performed By: [place] [address]  : Rogelio Llanos MD, PhD  CLIA Number: [id number]   08/17/2023 Not Detected  Final     Comment:     NOT DETECTED - A negative result does not rule out the   presence of PCR inhibitors in the patient specimen or   assay specific nucleic acid in concentrations below the   level of detection by the assay.    This test was developed and its performance characteristics   determined by [facility]. It has not been cleared or   approved by the US Food and Drug Administration. This test   was performed in a CLIA certified laboratory and is   intended for clinical purposes.  Performed By: [facility] [address]  [address] [place] [address]  : Rogelio Llanos MD, PhD  CLIA Number: [id number]    08/17/2023 Not Detected  Final     Comment:     NOT DETECTED - A negative result does not rule out the   presence of PCR inhibitors in the patient specimen or   assay specific nucleic acid in concentrations below the   level of detection by the assay.    This test was developed and its performance characteristics   determined by [employer]. It has not been cleared or   approved by the [place] Food and Drug Administration. This test   was performed in a CLIA certified laboratory and is   intended for clinical purposes.  Performed By: [employer]  [address] [place] [id number]  : Rogelio Llanos MD, PhD  CLIA Number: [id number]   01/24/2021 Detected (A)  Final     Comment:     (Note)  DETECTED - P. jirovecii DNA detected in this specimen.  Results should be used to aid in the diagnosis of PCP  pneumonia and must be interpreted in the context of host  risk factors, clinical presentation, and radiographic  imaging.  TEST INFORMATION: Pneumocystis jirovecii Detection by PCR  Test developed and characteristics determined by [employer]. See Compliance Statement B: [facility]/  Performed by [facility],  [address] [place] [id number] [phone]  [email or web address], Carri Red MD, Lab. Director       Culture   Date Value Ref Range Status   06/24/2024 No growth after 12 hours  Preliminary   06/24/2024 No growth after 12 hours  Preliminary   06/21/2024 1+ Stenotrophomonas maltophilia (A)  Preliminary     Comment:     Susceptibilities done on previous cultures   06/21/2024 1+ Enterococcus faecium (A)  Preliminary   06/21/2024 1+ Enterococcus faecalis (A)  Preliminary   06/21/2024 No growth after 2 days  Preliminary   06/21/2024 No growth after 3 days  Preliminary   06/21/2024 No growth after 3 days  Preliminary   06/19/2024 1+ Stenotrophomonas maltophilia (A)  Final   06/19/2024 1+ Enterococcus faecalis (A)  Final   06/19/2024 Yeast (A)  Preliminary   06/19/2024  Filamentous fungus (A)  Preliminary   06/19/2024 Culture in progress  Preliminary   06/19/2024 >100,000 CFU/mL Escherichia coli (A)  Preliminary   06/19/2024 >100,000 CFU/mL Enterococcus faecium VRE (A)  Preliminary   06/19/2024 50,000-100,000 CFU/mL Escherichia coli (A)  Preliminary   06/18/2024 3+ Normal alo  Final   06/18/2024 2+ Stenotrophomonas maltophilia (A)  Final   06/18/2024 2+ Stenotrophomonas maltophilia (A)  Final   06/18/2024 No Growth  Final     GS Culture   Date Value Ref Range Status   06/21/2024 See corresponding culture for results  Final   06/19/2024 See corresponding culture for results  Final     Culture Micro   Date Value Ref Range Status   05/01/2021   Final    Quantity not sufficient  Called to Maxim Norman at 1236 5/1/21.    mlb     05/01/2021 Culture negative after 30 days  Final   04/29/2021 No anaerobes isolated  Final   04/29/2021 No growth  Final   02/19/2021 Culture negative for acid fast bacilli  Final   02/19/2021   Final    Assayed at [facility] [address] [place] [address] [phone]   02/19/2021 Culture negative after 4 weeks  Final   02/19/2021 No growth after 4 weeks  Final   02/19/2021 Moderate growth  Staphylococcus epidermidis   (A)  Final   02/09/2021 No growth  Final         Last check of C difficile  C Diff Toxin B PCR   Date Value Ref Range Status   02/13/2021 Negative NEG^Negative Final     Comment:     Negative: C. difficile target DNA sequences NOT detected, presumed negative   for C.difficile toxin B or the number of bacteria present may be below the   limit of detection for the test.  FDA approved assay performed using [employer] GeneXpert real-time PCR.  A negative result does not exclude actual disease due to C. difficile and may   be due to improper collection, handling and storage of the specimen or the   number of organisms in the specimen is below the detection limit of the assay.       C Difficile Toxin B by PCR   Date Value Ref Range Status    06/22/2024 Negative Negative Final     Comment:     A negative result does not exclude actual disease due to C. difficile and may be due to improper collection, handling and storage of the specimen or the number of organisms in the specimen is below the detection limit of the assay.       Virology:  Coronavirus-19 testing    Recent Labs   Lab Test 06/18/24  1729 06/18/24  1433 06/17/24  1337 07/11/23  1132 10/12/22  1528 09/26/22  0956 05/23/22  1013 04/01/22  1257 02/07/22  1301 01/31/22  1309 01/24/22  1324 09/20/21  1859 06/07/21  1310 05/02/21  0715 04/26/21  1151 04/08/21  0723   UFL99GE  --   --   --   --   --   --   --   --   --   --   --   --   --  Negative  --   --    [id number]  --   --   --   --   --   --   --   --   --   --   --   --   --  Not Applicable  --   --    [id number] Negative Negative  --  Negative  --   --   --   --   --   --   --  Negative   < >  --   --  Test received-See reflex to IDDL test SARS CoV2 (COVID-19) Virus RT-PCR  NEGATIVE   [id number]  --   --   --   --   --   --   --   --   --   --   --   --   --   --   --  Nasopharyngeal   [id number]  --   --   --   --   --   --   --   --   --   --   --   --   --   --   --  Nasopharyngeal   COVIDPCREXT  --   --  Negative  --  Negative Negative Undetected   < > Undetected Undetected   < >  --    < >  --   --   --    SOUREXT  --   --   --   --   --   --  Nasopharynx  --  Nasopharynx Nasopharynx   < >  --    < >  --    < >  --    [id number]  --   --   --   --   --   --  Undetected  --  Undetected Undetected   < >  --    < >  --    < >  --     < > = values in this interval not displayed.       Respiratory virus (non-coronavirus-19) testing    Recent Labs   Lab Test 06/18/24 1729 06/18/24  1433 11/01/23  0920 10/27/23  0449 01/24/21  1822 01/24/21  1629 12/23/20  1102 02/27/18  1016 02/22/18  0900   RVSPEC  --   --   --   --   --  Bronchial Bronchial   < >  --    AFLU  --   --   --   --   --   --   --   --  Duplicate request*   IFLUA Not  Detected  --  Not Detected Not Detected   < > Negative Negative   < >  --    INFZA Negative   < >  --   --   --   --   --   --  Negative   FLUAH1 Not Detected  --  Not Detected Not Detected   < > Negative Negative   < >  --    [id number] Not Detected  --  Not Detected Not Detected   < > Negative Negative   < >  --    FLUAH3 Not Detected  --  Not Detected Not Detected   < > Negative Negative   < >  --    BFLU  --   --   --   --   --   --   --   --  Duplicate request*   IFLUB Not Detected  --  Not Detected Not Detected   < > Negative Negative   < >  --    INFZB Negative   < >  --   --   --   --   --   --  Negative   PIV1 Not Detected  --  Not Detected Not Detected   < > Negative Negative   < >  --    PIV2 Not Detected  --  Not Detected Not Detected   < > Negative Negative   < >  --    PIV3 Not Detected  --  Not Detected Not Detected   < > Negative Negative   < >  --    PIV4 Not Detected  --  Not Detected Not Detected   < >  --   --    < >  --    IRSV Negative   < >  --   --   --   --   --   --  Negative   HRVS  --   --   --   --   --  Negative Negative   < >  --    RSVA Not Detected  --  Not Detected Not Detected   < > Negative Negative   < >  --    RSVB Not Detected  --  Not Detected Not Detected   < > Negative Negative   < >  --    HMPV Not Detected  --  Not Detected Not Detected   < > Negative Negative   < >  --    ADVBE  --   --   --   --   --  Negative Negative   < >  --    ADVC  --   --   --   --   --  Negative Negative   < >  --    ADENOV Not Detected  --  Not Detected Not Detected   < >  --   --    < >  --    CORONA Not Detected  --  Not Detected Not Detected   < >  --   --    < >  --     < > = values in this interval not displayed.       CMV viral loads    Recent Labs   Lab Test 06/18/24  1803 06/12/24  0828 03/13/24  0853 03/05/24  0925 02/21/24  0838 02/14/24  1050 12/27/23  0834 12/19/23  1138 11/29/23  0826 11/21/23  0752 11/08/23  0846 11/01/23  0920 10/31/23  0606 10/24/23  1033 10/17/23  1011  "10/04/23  0810 09/07/23  0710 08/17/23  1144 08/17/23  1137 08/08/23  0828 08/08/23  0828 07/11/23  0952 05/26/23  0828 04/06/23  0725 03/08/23  0848 02/09/23  1034 11/18/22  0928 09/27/22  1012 03/15/21  0904 02/25/21  0542   CMVQNT <35*  --   --  Not Detected  --   --   --  Not Detected  --  Not Detected  --   --  <35*  --   --   --   --   --   --   --   --  Not Detected  --  Not Detected  --  <137*  --  Not Detected   < > CMV DNA Not Detected   CMVRESINST  --   --   --   --   --  350*  --   --   --   --   --   --   --  103*  80* 75*  --  521*  --   --   --  46*  --   --   --   --   --   --   --   --   --    CSPEC  --   --   --   --   --   --   --   --   --   --   --   --   --   --   --   --   --   --   --   --   --   --   --   --   --   --   --   --   --  EDTA PLASMA   CMVLOG <1.5  --   --   --   --  2.5  --   --   --   --   --   --  <1.5 2.0  1.9 1.9  --  2.7  --   --    < > 1.7  --   --   --   --  <2.1  --   --    < > Not Calculated   33458  --  46*   < >  --    < >  --    < >  --    < >  --    < >  --   --   --   --    < >  --   --   --   --   --   --    < >  --    < >  --    < >  --    < >  --    CMVQAL  --   --   --   --   --   --   --   --   --   --   --  Not Detected  --   --   --   --   --  Not Detected Not Detected  --   --   --   --   --   --   --   --   --   --   --     < > = values in this interval not displayed.       No results found for: \"H6RES\"    EBV DNA Copies/mL   Date Value Ref Range Status   05/01/2021 38,186 (A) EBVNEG^EBV DNA Not Detected [Copies]/mL Final   02/22/2021 75,709 (A) EBVNEG^EBV DNA Not Detected [Copies]/mL Final   01/25/2021 5,619 (A) EBVNEG^EBV DNA Not Detected [Copies]/mL Final   12/09/2020 10,686 (A) EBVNEG^EBV DNA Not Detected [Copies]/mL Final   09/09/2020 2,935 (A) EBVNEG^EBV DNA Not Detected [Copies]/mL Final   03/09/2020 8,918 (A) EBVNEG^EBV DNA Not Detected [Copies]/mL Final   02/19/2020 38,419 (A) EBVNEG^EBV DNA Not Detected [Copies]/mL Final   12/18/2019 11,933 (A) " EBVNEG^EBV DNA Not Detected [Copies]/mL Final   11/11/2019 9,669 (A) EBVNEG^EBV DNA Not Detected [Copies]/mL Final   10/24/2019 13,391 (A) EBVNEG^EBV DNA Not Detected [Copies]/mL Final   08/01/2018 EBV DNA Not Detected EBVNEG^EBV DNA Not Detected [Copies]/mL Final       BK viral loads   Recent Labs   Lab Test 08/07/19  0959   BKSPEC Plasma   BKRES BK Virus DNA Not Detected         Imaging:  XR Chest Port 1 View  Result Date: 6/20/2024  Impression: Bilateral pulmonary opacities, mildly increased in the upper lobes suggesting increased edema and underlying infection. The endotracheal tube projects 2.3 cm above the gee.    XR Chest Port 1 View  Result Date: 6/19/2024  IMPRESSION: ET tube tip projects 1.6 cm above the gee. Small bilateral pleural effusions with bibasilar, right greater than left airspace opacities suspicious for infection versus atelectasis.     CT Chest Hi-Resolution wo Contrast  Result Date: 6/18/2024  IMPRESSION: Bilateral transplanted lungs. Slight decreased confluent consolidative opacities in right lower lobe with more micronodular component of possible infection in the right lower lobe. Other abnormalities throughout the lungs similar to February of the transplanted lungs. Pleural effusions again noted. Narrowing of the bronchus intermedius on the right there appears to be occlusion of the right middle lobe bronchus with narrowing of the distal aspect of the right lower lobe bronchus with short segment occlusions of the medial and posterior basilar bronchial segments to the right lower lobe. No such abnormality on the left.. No ectopic air. Anastomoses appear grossly intact bilaterally with just under approximate 50% narrowing distal to the right mainstem anastomosis proximal to the upper lobe bronchus takeoff. This right middle lobe bronchial occlusion is new from February. Mitral annular calcifications with left atrial prominence, please correlate for arrhythmia. Borderline pulmonary  enlargement concerning for possible pulmonary hypertension. Borderline mid ascending aortic ectasia.       ECHO:  Echo Complete  Result Date: 6/19/2024  Interpretation Summary Global and regional left ventricular function is normal with an EF of 55-60%. Right ventricular function, chamber size, wall motion, and thickness are normal. The inferior vena cava cannot be assessed. No pericardial effusion is present. No significant valvular abnormalities present.     Adrianna Zamarripa MD  [facility]  Contact information available via [facility] Paging/Directory

## 2024-06-24 NOTE — PROGRESS NOTES
CRRT STATUS NOTE    DATA:  Time:  6:06 PM  Pressures WNL:  YES  Filter Status:  WDL    Problems Reported/Alarms Noted:  None    Supplies Present:  YES    ASSESSMENT:  Patient Net Fluid Balance:  Net negative 550mL  Vital Signs:  B/P: 77/46, T: 98.4, P: 122, R: 28   Goals of Therapy:  Goal net negative 0-100ml/hr.    INTERVENTIONS:   Levophed changed to norepinephrine due to continued afib/hypotension.    PLAN:  Continue current plan of care.

## 2024-06-24 NOTE — PROGRESS NOTES
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  The patient was admitted on 24 for progressive hypoxia with dyspnea and worsening hypercapnia in ED requiring intubation likely d/t post-obstructive PNA 2/2 right bronchus stenosis.  Bronch confirming severe stenosis of right anastomosis with extensive RLL plugging.  IP bronch () with laser tissue debulking RMB stenosis, airway balloon dilation of RMB and BI, and placement of stent RMB to BI and bifurcating stent in RUL.  S/p volume resuscitation and transition to CRRT  for hypotension.  Increased agitation/delirium on  with increasing desaturation led to need for increased sedation.  Remains intubated with pulmonary edema on imaging.        Today's recommendations:  - Follow pending cultures, ABX per transplant ID, agree with recommendation to stop levofloxacin  - Repeat Prospera pending  - Tacro level today to trend, no dose change, daily levels continued  - Prednisone chronic dose on hold for stress-dose steroids per MICU  - CMV weekly monitoring, resumed VGCV ppx (renally dosed) given stress dose steroids (ordered for you)     Acute on chronic hypoxic/hypercapneic respiratory failure:  Post-obstructive multi-lobular PNA:  Right bronchial stenosis with RML  collapse: Admitted with 2 weeks of progressive hypoxia, dyspnea, congested cough, and fatigue.  Chronic hypoxia with 2L NC, increased from 3 to 4L over this time with acute decompensation on day of admission associated with hypercapnia.  VBG 7/17/85 in ED s/p intubation.  IP bronch 2/15 s/p tissue debulking without stent placement.  Negative ID workup: respiratory panel, COVID, MRSA nares, PJP, Legionella, Strep pneumoniae, cocci, Histo, CrAg, fungitell, and A. galactomannan (blood).  IgG WNL.  Procal mildly elevated at 0.24 initially (then elevated to 1.77 6/20), LA normal, but febrile to 100.7.  TTE on admission grossly normal.  CT with RUL/RLL consolidative opacities, RML collapse, and narrowing of BI and distal RLL bronchus.  Started on norepi 6/19.  Bronch per MICU (6/19) with severe stenosis starting at right anastomosis, inspection with smaller scope revealing for extensive RLL mucous plugging.  Bronch per IP (6/20) with laser tissue debulking RMB stenosis, airway balloon dilation of RMB and BI, and placement of stent RMB to BI and bifurcating stent in RUL.  Respiratory cultures with Steno and E. faecalis.  Low grade fevers persisting, remains on full vent support.    - Bronch culture (6/19) with filamentous fungus, following for ID  - Blood cultures (6/21, 6/24) NGTD  - ABX: mereopenem (6/21), daptomycin (6/21), levofloxacin (6/21), and minocycline (6/20, Steno) per transplant ID, agree with stopping levofloxacin; s/p IV Zosyn (6/18-6/21), azithromycin 500 mg daily (6/18-6/20) and vancomycin (6/18)   - Nebs: levalbuterol QID, Mucomyst 10% BID alternating with 3% HTS (6/22)  - Vent management per MICU  - Stress dose steroids per MICU  - Repeat bronch in 6 weeks per IP, sooner if indicated     S/p bilateral sequential lung transplant (BSLT) for ILD:   Progressive CLAD: Most recent OP visit in February.  DSA negative 6/19 (last positive noted 2018).  Repeat ImmuKnow (6/19) 87, very low but IST adjustment  deferred per Dr. Graham given acuity and steroids (after ImmuKnow level).  - Repeat Prospera (6/19) pending, may be artificially elevated with current infection, but notably high at 2.42 on 2/14  - Repeat ImmuKnow is 2-4 weeks as OP  - PTA Singulair, azithromycin, and Breo inhaler (resume once extubated)     Immunosuppression:  On 2-drug IST since November d/t leukopenia and recurrent infections  - Tacrolimus 0.4 mg BID (resumed 6/23, prior levels elevated).  Goal level 8-10.  Level today 8.1, no dose adjustment, continue daily levels for now.  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  - ABX and infectious work up as above     Aspergillus ochraceus:  H/o Aspergillus empyema:  S/p empyema drainage and ampho B instillation.  Previously on voriconazole, transitioned to posaconazole and managed by transplant ID as OP with last visit 3/13.  Calcified fungal elements remain with additional recurrent effusion (likely associated with fluid disturbances r/t iHD), but surgical intervention previously deferred d/t risk.  Posaconazole level 2.5 on 6/19.  - Posaconazole 300 mg daily chronically     H/o CMV viremia: Recurrent CMV viremia historically.  VGCV ppx most recently stopped 4/12.  Most recent CMV low positive at 46 (6/12) and <35 (6/18).  - CMV weekly monitoring (ordered 6/25) with steroid increase  - Resumed VGCV ppx (renally dosed) given stress dose steroids (ordered)     EBV viremia: S/p rituximab 12/13/23 x1 dose.  Most recent EBV negative 6/18.     ESRD on iHD: Kidney evaluation started as OP.  On qMWF iHD schedule, no missed sessions.  Transitioned to CRRT on 6/20, management per renal and MICU with goal for fluid removal as able.      VRE urine  "culture: Noted 6/19, >100k GNB and VRE faecium, ABX as above with management per MICU team and transplant ID.     We appreciate the excellent care provided by the MICU team.  Recommendations communicated via this note.  Will continue to follow along closely, please do not hesitate to call with any questions or concerns.    Patient discussed with Dr. Graham.    Hina Huff, DNP, APRN, CNP  Inpatient Nurse Practitioner  Pulmonary CF/Transplant     Subjective & Interval History:     Remains on full vent support, minimal secretions via ETT.  Low grade fevers persist.  CRRT continues, pulling gently as able.  Still with agitated delirium associated with tachycardia and increased BP, otherwise with hypotension requiring 2 pressors when not agitated.    Review of Systems:     ROS significantly limited due to intubation and sedation.    C: + Decreased weight  INTEGUMENTARY/SKIN: No rash or obvious new lesions  ENT/MOUTH: No nasal congestion or drainage  RESP: See interval history  CV: No orthopnea  GI: No vomiting, no change in stools  ENDOCRINE: Blood sugars intermittently elevated  PSYCHIATRIC: See interval history    Physical Exam:     All notes, images, and labs from past 24 hours (at minimum) were reviewed.    Vital signs:  Temp: 100.2  F (37.9  C) Temp src: Axillary BP: (!) 77/46 Pulse: 84   Resp: 20 SpO2: 95 % O2 Device: Mechanical Ventilator Oxygen Delivery: 6 LPM Height: 160 cm (5' 3\") Weight: 56.7 kg (125 lb)  I/O:   Intake/Output Summary (Last 24 hours) at 6/24/2024 0723  Last data filed at 6/24/2024 0700  Gross per 24 hour   Intake 2530.56 ml   Output 3306 ml   Net -775.44 ml     Constitutional: Lying supine in bed, spouse at bedside, in no apparent distress.   HEENT: Eyes with pink conjunctivae, anicteric.  Orally intubated.  Left nare feeding tube.  PULM: Good air flow bilaterally.  Coarse crackles t/o R>L, no rhonchi, no wheezes.  Non-labored breathing on CMV 18/320/7/40.  CV: Normal S1 and S2.  Tachycardia to " 130's.  No murmur, gallop, or rub.  1+ BLE edema.   ABD: NABS, soft, nontender, nondistended.    MSK: Moves all extremities.   NEURO: Alert but not answering simple questions.   SKIN: Warm, dry, fragile.   PSYCH: Restless.     Data:     LABS    CMP:   Recent Labs   Lab 06/24/24  0335 06/24/24  0334 06/24/24  0013 06/23/24 2005 06/23/24  1628 06/23/24  1626 06/23/24  0745 06/23/24  0414 06/22/24  1614 06/22/24  1613 06/22/24  0441 06/22/24  0439 06/21/24  1208 06/21/24  0356     --   --   --   --  137  --  139  --  139  --  137  137   < > 134*  134*   POTASSIUM 4.1  --   --   --   --  4.2  --  4.2  --  3.9  --  3.9  3.9   < > 3.6  3.6   CHLORIDE 106  --   --   --   --  107  --  109*  --  109*  --  106  106   < > 103  103   CO2 23  --   --   --   --  23  --  22  --  21*  --  21*  21*   < > 20*  20*   ANIONGAP 6*  --   --   --   --  7  --  8  --  9  --  10  10   < > 11  11   * 208* 190* 206*   < > 202*   < > 172*  167*   < > 185*   < > 180*  180*   < > 158*  155*  155*   BUN 34.4*  --   --   --   --  29.7*  --  27.0*  --  28.0*  --  29.0*  29.0*   < > 32.3*  32.3*   CR 1.02*  --   --   --   --  1.04*  --  1.15*  --  1.39*  --  1.67*  1.67*   < > 2.72*  2.72*   GFRESTIMATED 62  --   --   --   --  61  --  54*  --  43*  --  34*  34*   < > 19*  19*   CHELSEY 8.1*  --   --   --   --  8.1*  --  8.0*  --  7.9*  --  8.0*  8.0*   < > 7.9*  7.9*   MAG 2.4*  --   --   --   --  2.3  --  2.3  --  2.4*  --  2.3   < > 2.2   PHOS 3.3  --   --   --   --  3.4  --  3.5  --  2.4*  --  2.5   < > 2.6   PROTTOTAL 4.7*  --   --   --   --   --   --  4.7*  --   --   --  4.6*  --  4.7*   ALBUMIN 2.4*  --   --   --   --  2.4*  --  2.4*  --  2.3*  --  2.3*  2.3*   < > 2.4*  2.4*   BILITOTAL 0.7  --   --   --   --   --   --  0.8  --   --   --  1.0  --  1.1   ALKPHOS 79  --   --   --   --   --   --  78  --   --   --  89  --  123   AST 12  --   --   --   --   --   --  17  --   --   --  11  --  12   ALT 18  --   --    "--   --   --   --  20  --   --   --  17  --  20    < > = values in this interval not displayed.     CBC:   Recent Labs   Lab 06/24/24  0335 06/23/24  0414 06/22/24  1615 06/22/24  0439   WBC 3.6* 5.4 6.0 6.0   RBC 2.41* 2.49* 2.58* 2.62*   HGB 7.5* 7.8* 8.0* 8.1*   HCT 23.7* 24.9* 25.3* 25.9*   MCV 98 100 98 99   MCH 31.1 31.3 31.0 30.9   MCHC 31.6 31.3* 31.6 31.3*   RDW 14.8 15.1* 15.0 15.0   PLT 69* 84* 89* 95*       INR:   Recent Labs   Lab 06/18/24  1418   INR 0.98       Glucose:   Recent Labs   Lab 06/24/24 0335 06/24/24 0334 06/24/24  0013 06/23/24 2005 06/23/24  1628 06/23/24  1626   * 208* 190* 206* 162* 202*       Blood Gas:   Recent Labs   Lab 06/24/24 0335 06/23/24  1639 06/23/24  1143 06/23/24  0906 06/21/24  0817 06/19/24  2359   PHV  --   --  7.28* 7.28*  --  7.40   PCO2V  --   --  55* 53*  --  43   PO2V  --   --  35 35  --  46   HCO3V  --   --  25 25  --  26   LASHAUN  --   --  -1.6 -2.1  --  1.3   O2PER 50 50 60 50  50   < > 30    < > = values in this interval not displayed.       Culture Data No results for input(s): \"CULT\" in the last 168 hours.    Virology Data:   Lab Results   Component Value Date    FLUAH1 Not Detected 06/18/2024    FLUAH3 Not Detected 06/18/2024    NV0795 Not Detected 06/18/2024    IFLUB Not Detected 06/18/2024    RSVA Not Detected 06/18/2024    RSVB Not Detected 06/18/2024    PIV1 Not Detected 06/18/2024    PIV2 Not Detected 06/18/2024    PIV3 Not Detected 06/18/2024    HMPV Not Detected 06/18/2024    HRVS Negative 01/24/2021    ADVBE Negative 01/24/2021    ADVC Negative 01/24/2021    ADVC Negative 12/23/2020    ADVC Negative 10/07/2019       Historical CMV results (last 3 of prior testing):  Lab Results   Component Value Date    CMVQNT <35 (A) 06/18/2024    CMVQNT Not Detected 03/05/2024    CMVQNT Not Detected 12/19/2023     Lab Results   Component Value Date    CMVLOG <1.5 06/18/2024    CMVLOG 2.5 02/14/2024    CMVLOG <1.5 10/31/2023       Urine Studies    Recent " Labs   Lab Test 06/19/24  1214 01/24/21  1729   URINEPH 6.5 5.0   NITRITE Negative Negative   LEUKEST Large* Moderate*   WBCU >182* 34*       Most Recent [place] Pulmonary Function Testing (FVC/FEV1 only)  FVC-Pre   Date Value Ref Range Status   02/14/2024 0.85 L    12/19/2023 1.04 L    11/21/2023 0.85 L    10/24/2023 0.96 L      FVC-%Pred-Pre   Date Value Ref Range Status   02/14/2024 29 %    12/19/2023 36 %    11/21/2023 29 %    10/24/2023 33 %      FEV1-Pre   Date Value Ref Range Status   02/14/2024 0.80 L    12/19/2023 0.89 L    11/21/2023 0.78 L    10/24/2023 0.87 L      FEV1-%Pred-Pre   Date Value Ref Range Status   02/14/2024 34 %    12/19/2023 38 %    11/21/2023 33 %    10/24/2023 37 %        IMAGING    Recent Results (from the past 48 hour(s))   XR Chest Port 1 View    Narrative    Exam: XR CHEST PORT 1 VIEW, 6/22/2024 8:14 AM    Indication: ILD s/p lung transplant, stenotrophomonas PNA; increasing  pressor needs; eval changes in opacities/effusions    Comparison: 6/21/2024    Findings:   Portable AP radiograph of the chest. Endotracheal tube tip terminates  approximately 3 cm above the gee. Right IJ central venous catheter  tip is in the mid SVC. Feeding tube tip terminates within the fourth  portion of the duodenum. Postoperative changes along transplant.  Mediastinal clips and right axillary clips project similarly. Right  main stem bronchus stent.    Cardiac silhouette is obscured by pulmonary opacities. Low lung  volumes diffuse hazy and patchy pulmonary opacities greatest within  the left lung and lung bases bilaterally. No pneumothorax. Probable  small left effusion however, the left costophrenic angle is clipped.  The upper abdomen is unremarkable. No acute osseous abnormality.  Increased right effusion and right lower zone opacity      Impression    Impression:     1. Mid zone predominant bilateral, left greater than right,  interstitial and airspace opacities that likely represent  pulmonary  edema or infection is stable to minimally increased   2. support devices project in a similar position. Endotracheal tube is  3 cm above the gee.  3. Increased small right effusion and right lower zone opacities    I have personally reviewed the examination and initial interpretation  and I agree with the findings.    EDIE VILLA MD         SYSTEM ID:  [id number]   XR Chest Port 1 View    Narrative    Exam: XR CHEST PORT 1 VIEW, 6/23/2024 1:58 AM    Comparison: 6/22/2024    History: worsening oxygenation    Findings:  Portable AP view of the chest. Median sternotomy wires. Stable support  devices. Right bronchial stent. Increased opacification of the left  hemithorax. Right basilar patchy mixed opacities. No pneumothorax.       Impression    Impression:   1. Mildly increased opacification of the left hemithorax, suggestive  of diffuse pulmonary edema versus infection. Stable right basilar  mixed opacities.   2. Stable support devices.    I have personally reviewed the examination and initial interpretation  and I agree with the findings.    CARMELINA VILLALOBOS MD         SYSTEM ID:  [id number]   XR Chest Port 1 View    Impression    RESIDENT PRELIMINARY INTERPRETATION  Impression:   1. Overall improved aeration in the left lung with persistent  opacities in the left lung and right lung base, likely improving  pulmonary edema or infection.  2. Stable support devices.

## 2024-06-24 NOTE — PROGRESS NOTES
CRRT STATUS NOTE    DATA:  Time:  6:21 AM  Pressures WNL:  YES  Filter Status:  WDL    Problems Reported/Alarms Noted:  None     Supplies Present:  YES    ASSESSMENT:  Patient Net Fluid Balance:  -523 mL 6/23, -141 mL since MN  Vital Signs:  Temp:  [98.4  F (36.9  C)-100.5  F (38.1  C)] 100.2  F (37.9  C)  Pulse:  [] 133  Resp:  [25-34] 26  BP: ()/(25-97) 77/46  MAP:  [54 mmHg-95 mmHg] 84 mmHg  Arterial Line BP: ()/(42-94) 138/63  FiO2 (%):  [40 %-60 %] 40 %  SpO2:  [78 %-100 %] 96 %    Labs:    Lab Results   Component Value Date    WBC 3.6 (L) 06/24/2024    HGB 7.5 (L) 06/24/2024    HCT 23.7 (L) 06/24/2024    PLT 69 (L) 06/24/2024     06/24/2024    POTASSIUM 4.1 06/24/2024    CHLORIDE 106 06/24/2024    CO2 23 06/24/2024    BUN 34.4 (H) 06/24/2024    CR 1.02 (H) 06/24/2024     (H) 06/24/2024    SED 10 06/18/2024    DD 2.0 (H) 02/11/2021    NTBNPI 97,619 (H) 09/20/2021    TROPONIN 0.020 09/20/2021    TROPI <0.015 01/24/2021    AST 12 06/24/2024    ALT 18 06/24/2024     (H) 06/18/2024    ALKPHOS 79 06/24/2024    BILITOTAL 0.7 06/24/2024    SALAS <10 (L) 01/24/2021    INR 0.98 06/18/2024       Goals of Therapy:  I=O    INTERVENTIONS:   None needed.     PLAN:  Continue plan of care. Contact CRRT Resource via [employer] with any questions or concerns.

## 2024-06-25 NOTE — PROGRESS NOTES
BRIEF ICU PROGRESS NOTE    Patient developed Afib with RVR at around 2 AM after turning. MAP remains stable. Weaning off norepinephrine as its beta activity will precipitates RVR. After NE was weaned off, her HR remains high. Metoprolol 5 mg IV was given. Increased vaso to 2.4 as MAP is downtrending. Plan to start phenyl rather than norepinephrine for 2nd pressor if needed.    Discussed with Dr. Pratt.    Chio Cortez MD  PGY-2 Internal Medicine   None

## 2024-06-25 NOTE — PLAN OF CARE
ICU End of Shift Summary. See flowsheets for vital signs and detailed assessment.    Changes this shift: Start of shift pt very agitated, difficult to reorient.  Gave 1 PRN bolus of fentanyl, small dex increase.  Patient slept few hours and now seems more calm.  Still unable to orient.  Pt remained in a-fib w/variable rates between 100-160. Due to limited access in lieu of amio 3x 5mg metoprolol doses were in attempt to convert her rhythm, pt self-converted prior to med admin. RUL sounds the most coarse with crackles, scant to no secretions inline.  Team aware of ABG results, no vent changes for now. No BM, ordered meds given w/o result. Insulin gtt added, Algorithm 2. Toes remain cool and dusky w/slow cap refill - team aware.  at bedside most of today interacting with patient and staff. CRRT goal changed to Net even  or slightly positive, @19:00 about 150mL + for the day.    Plan: Continue to monitor afib and give PRNs as necessary. Attempt to wean pressors.  New CRRT goal of Net even to sightly positive. Escalate bowel regimen with PRNs. Awaiting rest of chemistry results, replace as needed per CRRT PRN orders.    Problem: Adult Inpatient Plan of Care  Goal: Absence of Hospital-Acquired Illness or Injury  Description: Patient will wean off of blood pressure medications and maintain MAP >65 in next few days as new antibiotic changes address infection sources.  Outcome: Not Progressing   Goal Outcome Evaluation:    soft restraints - unsecured L & R mitts restraints continued 6/25/2024    Clinical Justification: Pulling lines, pulling tubes, and pulling equipment  Less Restrictive Alternative: 1:1 patient care, Repositioning, Re-evaluate equipment, Disguise equipment, Pain management, De-escalation, Reorientation  Attending Provider Notified: Yes, Attending Provider's Name: Dr. Guevara   New orders placed Yes  Length of Order: 1 Day      Evelyn Hooks RN

## 2024-06-25 NOTE — PROGRESS NOTES
CLINICAL NUTRITION SERVICES - REASSESSMENT NOTE     Nutrition Prescription    RECOMMENDATIONS FOR MDs/PROVIDERS TO ORDER:  Discussed w/ team, recommend miralax or senna daily to attempt to even out stooling. Monitor stool trends and adjust bowel medications as needed. Also recommended stopping Nutrisource fiber 1 packet daily as not enough fiber to likely impact stooling and patient now multiple days without BM again.     Malnutrition Status:    Moderate malnutrition in the context of acute illness/disease    Recommendations already ordered by Registered Dietitian (RD):  Continue current TF regimen as ordered    Future/Additional Recommendations:  Monitor pressor needs  Monitor stool output     EVALUATION OF THE PROGRESS TOWARD GOALS   Diet: NPO + TF    Nutrition Support: access: NDT    Formula/schedule/modulars: -__: Goal TF: Novasource Renal (or equivalent) @ 30 ml/hr (720 ml) + 2 Prosource Tf20 provides 1600 kcal (30 kcal/kg), 105 g pro (2.0g/kg), 132 g CHO, 516 ml free water, and 0 g fiber daily.       Free water flushes: 30 mL every 4 hours    Intake/Tolerance: Tube Feedin day average tube feeding infusion of 610 mL (85 % of goal volume) + 5 day average intake of 2 Prosource TF20 packet(s) (100 % of prescribed packets)  = average intake of 1380 kcal/day and 95 g protein/day.     NEW FINDINGS     GI:  Last BM: 24  Variable stooling. No BM from -  PRN bowel medications + 1 Nutrisource fiber packet daily  - per team    Weight:  Most Recent Weight: 57.4 kg (126 lb 8.7 oz)  on 24 via Bed scale  Body mass index is 22.42 kg/m .  Wt up 6.6 kg from admission. + 8.8 L from admission per I/O.     Meds:  Vitamin B and C complex w/ folic acid daily; vitamin D3 50 mcg MWF; TUMS daily , , Sat, Sun  Micafungin  Protonix  Bactrim  Tacrolimus  Valcyte  PRN miralax, senna   Continuous: precedex, fentanyl, heparin, insulin, norepi stopped, phenylephrine @ 0.5 mcg/kg/min, vaso @ 2.4 ml/hr    Labs:    06/23/24 16:26 06/23/24 20:06 06/24/24 03:35 06/24/24 16:10 06/24/24 18:16 06/25/24 03:48   Sodium 137  135 137  136   Potassium 4.2  4.1 4.3  4.1   Chloride 107  106 106  105   Carbon Dioxide (CO2) 23  23 22  22   Urea Nitrogen 29.7 (H)  34.4 (H) 38.0 (H)  40.7 (H)   Creatinine 1.04 (H)  1.02 (H) 1.02 (H)  1.02 (H)   GFR Estimate 61  62 62  62   Calcium 8.1 (L)  8.1 (L) 8.0 (L)  8.0 (L)   Anion Gap 7  6 (L) 9  9   Magnesium 2.3  2.4 (H) 2.4 (H)  2.4 (H)   Phosphorus 3.4  3.3 4.1  3.9   Albumin 2.4 (L)  2.4 (L) 2.5 (L)  2.4 (L)   Protein Total   4.7 (L)   4.6 (L)   Alkaline Phosphatase   79   114   ALT   18   28   AST   12   16   Bilirubin Direct   0.56 (H)   0.63 (H)   Bilirubin Total   0.7   0.7   Calcium Ionized Whole Blood 4.7  4.8 4.8  4.7   CK Total   24 (L)      Glucose 202 (H)  207 (H) 255 (H)  279 (H)   Hemoglobin A1C      5.1   Lactic Acid 0.7 0.9   0.9        Respiratory:   Intubated on mechanical vent    Renal:  CRRT    MALNUTRITION  % Intake: Decreased intake does not meet criteria  % Weight Loss: None noted -- likely confounded by fluid status  Subcutaneous Fat Loss: Facial region:  mild   Muscle Loss: Temporal:  mild and Thoracic region (clavicle, acromium bone, deltoid, trapezius, pectoral):  mild  Fluid Accumulation/Edema: None noted  Malnutrition Diagnosis: Moderate malnutrition in the context of acute illness/disease    Previous Goals   Total avg nutritional intake to meet a minimum of 25 kcal/kg and 1.5 g PRO/kg daily (per dosing wt 52.8 kg).  Evaluation: Met    Previous Nutrition Diagnosis  Inadequate oral intake related to intubation as evidenced by NPO with nutrition support needed to meet nutrition needs.    Evaluation: No change    CURRENT NUTRITION DIAGNOSIS  Inadequate oral intake related to intubation as evidenced by NPO with nutrition support needed to meet nutrition needs.      INTERVENTIONS  Implementation  Collaboration with other providers  Enteral Nutrition - continue as ordered   "    Goals  Total avg nutritional intake to meet a minimum of 25 kcal/kg and 1.5 g PRO/kg daily (per dosing wt 52.8 kg).    Monitoring/Evaluation  Progress toward goals will be monitored and evaluated per protocol.      Olesya Velasco MS, RDN, LD  4C MICU RD  Vocera - \"4C Clinical Dietitian\"  Weekend/Holiday RD - \"Weekend Clinical Dietitian\"    "

## 2024-06-25 NOTE — PROVIDER NOTIFICATION
Called MD again as HR still in 130-160's for almost 50 minutes now, inquiring if any intervention will be ordered for HR.  Levo now off 20 minutes and did not increase vasopressin as MAPs are not requiring more pressors.

## 2024-06-25 NOTE — PROGRESS NOTES
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 Nephrology consulted for management of HD while admitted.      Interval History :   Mrs Blue continues on CRRT, back on Vasopressin for pressor and on/off 2nd pressor, having some RVR with a-fib with HR's in 130's although varies from 110-150's. Can consider using digoxin while we are on CRRT as she will have consistent clearance and it should have little hemodynamic effect. Will not prioritize pulling fluid today as pulm status is somewhat improved, will plan for I=O to slightly positive.      Assessment & Recommendations:   ESRD-ESKD: pt dialyzes MWF at [facility] (ph [phone], f [phone]) with Dr. Glasgow. Run time: 3.5 hrs. EDW 59.7 kg. Access: L AVF. +heparin 1,500u bolus.                  -Appreciate team placing tri-alysis line to run norepi and will plan for CRRT, 4k baths, even to slightly positive today, 500u heparin/h through circuit.                -No need for new dialysis consent, continuing long term HD for ESRD.                -L AVF with remote hx of needing intervention, no issue recently.      Outpt Rx:       Volume-Above EDW by ~5kg but may have some variability with scales, ordered for I=O to slightly positive.      Pulm-Admitted with hypercapnic resp failure, suspected PNA. ID involved, getting bronch.  Currently on Zosyn, bactrim, Posaconazole, Azithromycin and Vanco x1 dose so far.       Electrolytes-Stable on CRRT, 4k baths, checking labs BID.      BMD-Stable on CRRT, has replacements ordered with CRRT order set.      Anemia-Hgb 7.0 and drifting, on venofer 50mg  "weekly but will hold while treating for infection. On Mircera 60mcg h2oadcv, will cover with short term Epo while admitted when stable enough for HD.       Nutrition-Novasource renal started      Time spent: 50 minutes on this date of encounter for chart review, physical exam, medical decision making and co-ordination of care.      Seen and discussed with Dr Vazquez     Recommendations were communicated to primary team via verbal communication.     ADRIÁN Lo CNS  Clinical Nurse Specialist  [phone]    Review of Systems:   I reviewed the following systems:  ROS not done due to vent/sedation    Physical Exam:   I/O last 3 completed shifts:  In: 3013.11 [I.V.:1723.11; NG/GT:570]  Out: 2864 [Other:2864]   BP (!) 83/46 (BP Location: Left leg)   Pulse (!) 136   Temp 99.1  F (37.3  C) (Axillary)   Resp 24   Ht 1.6 m (5' 3\")   Wt 57.4 kg (126 lb 8.7 oz)   LMP 06/07/2014 (Exact Date)   SpO2 (!) 86%   BMI 22.42 kg/m       GENERAL APPEARANCE: Intubated but alert.    EYES: No scleral icterus  Pulmonary: lungs Rhonchi to auscultation with equal breath sounds bilaterally  CV: Regular rhythm, normal rate   - Edema none  GI: soft, nontender, normal bowel sounds  MS: no evidence of inflammation in joints, no muscle tenderness  : No Basilio  SKIN: no rash, warm, dry  NEURO: No focal deficits    Labs:   All labs reviewed by me  Electrolytes/Renal -   Recent Labs   Lab Test 06/25/24  0948 06/25/24  0840 06/25/24  0350 06/25/24  0348 06/24/24  1614 06/24/24  1610 06/24/24  0825 06/24/24  0335   NA  --   --   --  136  --  137  --  135   POTASSIUM  --   --   --  4.1  --  4.3  --  4.1   CHLORIDE  --   --   --  105  --  106  --  106   CO2  --   --   --  22  --  22  --  23   BUN  --   --   --  40.7*  --  38.0*  --  34.4*   CR  --   --   --  1.02*  --  1.02*  --  1.02*   * 225* 258* 279*   < > 255*   < > 207*   CHELSEY  --   --   --  8.0*  --  8.0*  --  8.1*   MAG  --   --   --  2.4*  --  2.4*  --  2.4*   PHOS  --   " --   --  3.9  --  4.1  --  3.3    < > = values in this interval not displayed.       CBC -   Recent Labs   Lab Test 06/25/24  0348 06/24/24  0335 06/23/24  0414   WBC 3.0* 3.6* 5.4   HGB 7.0* 7.5* 7.8*   PLT 70* 69* 84*       LFTs -   Recent Labs   Lab Test 06/25/24  0348 06/24/24  1610 06/24/24  0335 06/23/24  1626 06/23/24  0414   ALKPHOS 114  --  79  --  78   BILITOTAL 0.7  --  0.7  --  0.8   ALT 28  --  18  --  20   AST 16  --  12  --  17   PROTTOTAL 4.6*  --  4.7*  --  4.7*   ALBUMIN 2.4* 2.5* 2.4*   < > 2.4*    < > = values in this interval not displayed.       Iron Panel -   Recent Labs   Lab Test 02/03/21  0415 12/13/18  1033 08/01/18  0921   IRON 51 16* 93   IRONSAT 36 7* 37   YOLA  --  302* 571*           Current Medications:  Current Facility-Administered Medications   Medication Dose Route Frequency Provider Last Rate Last Admin    acetylcysteine (MUCOMYST) 10 % nebulizer solution 4 mL  4 mL Inhalation BID Velez Reyes, German, MD   4 mL at 06/25/24 0845    azithromycin (ZITHROMAX) tablet 250 mg  250 mg Oral or Feeding Tube Daily Velez Reyes, German, MD   250 mg at 06/25/24 0824    B and C vitamin Complex with folic acid (NEPHRONEX) liquid 5 mL  5 mL Per Feeding Tube Daily Awa Watt MD   5 mL at 06/25/24 0824    calcium carbonate (TUMS) chewable tablet 500 mg  500 mg Oral Once per day on Sunday Tuesday Thursday Saturday Josesito Pratt MD   500 mg at 06/25/24 0910    cefTAZidime (FORTAZ) 2 g vial to attach to  ml bag for ADULTS or NS 50 ml bag for PEDS  2 g Intravenous Q8H Chio Cortez MD   2 g at 06/25/24 0400    chlorhexidine (PERIDEX) 0.12 % solution 15 mL  15 mL Mouth/Throat Q12H Chio Cortez MD   15 mL at 06/25/24 0824    fludrocortisone (FLORINEF) tablet 0.1 mg  0.1 mg Oral or Feeding Tube Daily Evelyn Roberts APRN CNP   0.1 mg at 06/25/24 0826    hydrocortisone sodium succinate PF (solu-CORTEF) injection 50 mg  50 mg Intravenous Q6H Evelyn Roberts APRN CNP    50 mg at 06/25/24 0959    levalbuterol (XOPENEX) neb solution 0.63 mg  0.63 mg Nebulization 4x Daily Gareth Mosquera MD   0.63 mg at 06/25/24 0845    linezolid (ZYVOX) infusion 600 mg  600 mg Intravenous Q12H Chio Cortez MD   600 mg at 06/25/24 0028    [Held by provider] magnesium chloride CR tablet 1,070 mg  1,070 mg Oral Once per day on Sunday Tuesday Thursday Saturday Josesito Pratt MD        melatonin tablet 3 mg  3 mg Oral or Feeding Tube At Bedtime Velez Reyes, German, MD   3 mg at 06/24/24 1938    [Held by provider] metoprolol tartrate (LOPRESSOR) tablet 25 mg  25 mg Oral BID Velez Reyes, German, MD        micafungin (MYCAMINE) 150 mg in sodium chloride 0.9 % 100 mL intermittent infusion  150 mg Intravenous Q24H Chio Cortez  mL/hr at 06/25/24 0126 150 mg at 06/25/24 0126    minocycline (MINOCIN) 100 mg in sodium chloride 0.9 % 100 mL intermittent infusion  100 mg Intravenous Q12H Kajal Chong APRN  mL/hr at 06/24/24 2328 100 mg at 06/24/24 2328    montelukast (SINGULAIR) tablet 10 mg  10 mg Oral At Bedtime Velez Reyes, German, MD   10 mg at 06/24/24 2118    pantoprazole (PROTONIX) 2 mg/mL suspension 40 mg  40 mg Per Feeding Tube Daily Josesito Pratt MD   40 mg at 06/24/24 1215    polyethylene glycol (MIRALAX) Packet 17 g  17 g Oral or Feeding Tube Daily Velez Reyes, German, MD        posaconazole (NOXAFIL) DR tablet TBEC 300 mg  300 mg Per Feeding Tube Daily Josesito Pratt MD   300 mg at 06/24/24 1214    [Held by provider] predniSONE (DELTASONE) tablet 5 mg  5 mg Oral Daily Velez Reyes, German, MD   5 mg at 06/21/24 0822    protein modular (PROSOURCE TF20) packet 1 packet  1 packet Per Feeding Tube BID Awa Watt MD   1 packet at 06/25/24 0824    QUEtiapine (SEROquel) tablet 150 mg  150 mg Oral or Feeding Tube At Bedtime Velez Reyes, German, MD        sodium chloride (NEBUSAL) 3 % neb solution 3 mL  3 mL Nebulization BID Velez Reyes, German, MD   3 mL at  06/24/24 2054    sodium chloride (PF) 0.9% PF flush 10 mL  10 mL Intracatheter Q8H Kajal Chong, APRN CNP   10 mL at 06/25/24 0834    sodium chloride (PF) 0.9% PF flush 10 mL  10 mL Intracatheter Q8H Kajal Chong, APRN CNP   10 mL at 06/25/24 0827    sulfamethoxazole-trimethoprim (BACTRIM) 400-80 MG per tablet 1 tablet  1 tablet Oral or Feeding Tube Daily Josesito Pratt MD   1 tablet at 06/24/24 1938    tacrolimus (GENERIC) suspension 0.4 mg  0.4 mg Oral or Feeding Tube QAM Yenni Graham MD   0.4 mg at 06/25/24 0824    And    tacrolimus (GENERIC) suspension 0.4 mg  0.4 mg Oral or Feeding Tube QPM Yenni Graham MD   0.4 mg at 06/24/24 1855    valGANciclovir (VALCYTE) solution 450 mg  450 mg Per Feeding Tube Daily Rufina Huff CNP   450 mg at 06/25/24 0824    Vitamin D3 (CHOLECALCIFEROL) tablet 50 mcg  50 mcg Oral Once per day on Monday Wednesday Friday Velez Reyes, German, MD   50 mcg at 06/24/24 0759     Current Facility-Administered Medications   Medication Dose Route Frequency Provider Last Rate Last Admin    dexmedeTOMIDine (PRECEDEX) 4 mcg/mL in sodium chloride 0.9 % 100 mL infusion  0.1-1.2 mcg/kg/hr (Dosing Weight) Intravenous Continuous Chio Cortez MD 14 mL/hr at 06/25/24 0914 1.1 mcg/kg/hr at 06/25/24 0914    dextrose 10% infusion   Intravenous Continuous PRN Velez Reyes, German, MD        dextrose 10% infusion   Intravenous Continuous PRN Awa Watt MD        dialysate for CVVHD & CVVHDF (Phoxillum BK4/2.5)  12.5 mL/kg/hr CRRT Continuous Jonna Rodas  mL/hr at 06/25/24 0634 12.5 mL/kg/hr at 06/25/24 0634    fentaNYL (SUBLIMAZE) infusion   mcg/hr Intravenous Continuous Velez Reyes, German, MD 2 mL/hr at 06/25/24 0900 100 mcg/hr at 06/25/24 0900    heparin (porcine) 20,000 units in 20 mL ANTICOAGULANT infusion (syringe from pharmacy)  500 Units/hr CRRT Continuous Hardy Tran APRN CNS 0.5 mL/hr at 06/25/24 1000 500 Units/hr at 06/25/24 1000     insulin regular (MYXREDLIN) 1 unit/mL infusion  0-24 Units/hr Intravenous Continuous Velez Reyes, German, MD 2.5 mL/hr at 06/25/24 1052 2.5 Units/hr at 06/25/24 1052    phenylephrine (DANDY-SYNEPHRINE) 50 mg in NaCl 0.9 % 250 mL infusion  0.1-6 mcg/kg/min (Dosing Weight) Intravenous Continuous Chio Cortez MD 7.6 mL/hr at 06/25/24 1000 0.5 mcg/kg/min at 06/25/24 1000    POST-filter replacement solution for CVVHD & CVVHDF (Phoxillum BK4/2.5)   CRRT Continuous Jonna Rodas  mL/hr at 06/23/24 1633 New Bag at 06/23/24 1633    PRE-filter replacement solution for CVVHD & CVVHDF (Phoxillum BK4/2.5)  12.5 mL/kg/hr CRRT Continuous Jonna Rodas  mL/hr at 06/25/24 0634 12.5 mL/kg/hr at 06/25/24 0634    vasopressin 1 unit/mL MAX Conc (PITRESSIN) infusion  2.4 Units/hr Intravenous Continuous Chio Cortez MD 2.4 mL/hr at 06/25/24 0945 2.4 Units/hr at 06/25/24 0945

## 2024-06-25 NOTE — PLAN OF CARE
ICU End of Shift Summary. See flowsheets for vital signs and detailed assessment.    Changes this shift: Continues to be delirious but able to answer questions intermittently, follow commands, restless most of shift.  Endorses hallucinations.  Dex at 1.2, fentanyl at 100, one bolus of fentanyl given with no change in restlessness or VS.  No voids today.  Vaso gtt reduced to 1.8, intermittently off both vaso/norepi and then requiring both pressors up to norepi 0.08.  This evening because hypotensive with MAPs in 40s, not responding to vaso 4, levo 0.6.  Pressors were infusing through trialysis line, no blood noted in line but received downstream occlusion alarm for levo when rate increased to 0.6. Flushed line, did not initially flush but did although difficult.  When line reconnected MAP increased considerable and pressors weaned down to 1.8 vaso with norepi off.  Both MAP and O2 sats labile with level of agitation; O2 increased to 60% FiO2 this morning due to repeated desaturations, pO2 80 on 1600 abg.      Plan:  Seroquel dose doubled for this evening, melatonin added to optimize sleep as she has not slept for at least three nights.  Delirium precautions maintained.  Per attending ok to resume pulling fluid via CRRT when MAPs stabilized.    Goal Outcome Evaluation:      Plan of Care Reviewed With: patient, spouse    Overall Patient Progress: improvingOverall Patient Progress: improving    Outcome Evaluation: VS continue to be labile, off prressors for periods of time, pulling fluid on CRRT

## 2024-06-25 NOTE — PROGRESS NOTES
[facility]    Transplant Infectious Diseases Inpatient Progress note      Kecia Blue MRN# [id number]   YOB: 1960 Age: 63 year old   Date of Admission: 6/18/2024  1:47 PM  Transplant: 3/1/2018 (Lung), Postoperative day: 2308            Recommendations:   Continue IV ceftazidime, equivalent to 2 gm q8 hr but adjust per renal function  Continue IV minocycline 100 mg q12 hrs.   Continue linezolid 600 mg q12 hr - this can be oral.  Continue IV micafungin 150 mg daily.   Continue PO posaconazole 300 mg daily  On azithromycin per transplant pulm. Valcyte and Bactrim ppx.         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         Active Problems and Infectious Diseases Issues:     Acute hypercapnic and hypoxic respiratory failure, intubated 6/18/24  Multilobar pneumonia, right  Right mainstem bronchial stenosis requiring dilations (dilated 2/15/24, s/p 6/20 dilation with stent placement)  Airways colonized with E faecalis, E faecium (VRE), S maltophilia, and now filamentous fungus.   It is difficult to ascertain which of these pathogens is/are responsible for pneumonia. Will treat as above.   It is possible that the bronchoscopy with dilation stirred the pathogens colonizing the airways and resulted in septic shock.       Susceptibility      Stenotrophomonas maltophilia Enterococcus faecalis     JAYME JAYME     Ampicillin   <=2 ug/mL Susceptible     Ceftazidime >16 Resistant       Gentamicin Synergy   Resistant u... Resistant 1     Levofloxacin 8 Resistant       Minocycline 4 Susceptible       Trimethoprim/Sulfamethoxazole <=2/38 Susceptible       Vancomycin   1 ug/mL Susceptible                      Pyuria and bacteriuria in HD patient.   Urine culture with E. Coli x2, E. Faecium (VRE). The significance of these pathogens in this HD patient is not clear. Due to septic shock we are treating.   Daptomycin changed to linezolid on 6/24 in order to also cover the newly identified E faecium in the lungs.   Zosyn --> meropenem --> now to ceftazidime since E coli is not ESBL.    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   Now with positive A galactomannan in one of two BALs with filamentous fungus growing (6/19 BAL) while on therapeutic posaconazole.   Will add micafungin pending identification. 6/21 BAL with negative AGM and no fungal growth to date.          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 After treatment and stopping of valcyte, recurrence of CMV viremia to 1k in 02/2024, resumed valcyte ppx (renally dosed) and negative since 2/28/24. Valcyte again held in March 2024, with repeats negative to date. CMV negative 6/19/24.     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 Decreased to 135k 2/2022. Neg 6/27/22.   Increased to 1 million in 11/2023 s/p 1 dose of rituximab and decreasing/undetected in 05/2024. Negative EBV 6/19/24.    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 BAL showed septate hyphae. 7/2020 had hypodense mass in left lung with biopsy showing fungal hyphae, cx aspergillus. Started on voriconazole in 2019, changed to posaconazole in 2020,  she remains on 300 mg daily.  - 1/2021 BAL cx with steno: tx ceftazidime for 2 weeks  - 1/2021-2/2021 - ARDS due to PJP pneumonia (had stopped TMP-SMX due to side effects). Recovery from this required tracheostomy.  - 2019 - She had also grown Actinomyces from multiple BAL    Other Infectious Disease issues include:  - QTc interval:  450 msec on 6/18/24  - Bacterial prophylaxis:  azithromycin per Tx pulm  - Pneumocystis prophylaxis:  Bactrim for life (hx PJP)  - Viral serostatus & prophylaxis: CMV D+/R+, EBV D+/R+   - Fungal prophylaxis:  posaconazole  - Immunization status:  Seasonal influenza, RSV, and COVID vaccine UTD  - Gamma globulin status:  979 on 10/27/23  - Isolation status: contact for VRE      Attestation:  Total duration of visit including chart review, reviewing labs and imaging, interviewing and examining the patient, documentation, and sending communication to the patient and to the primary treating team, all at the same day of this encounter, is: 50 minutes.    DAVIDA RAMAN PA-C  [facility]  Contact information available via [facility] Paging/Directory or "[email or web address]"    06/25/2024      Interim History and Events:   Developed Afib with RVR 6/25. Attempting to remove some fluid via CRRT. Tmax yesterday 100.2F. Still on pressors x1-2. Afebrile today, WBC down to 3.0. Vent settings down slightly to 50% FiO2 from 60% yesterday, PEEP 7.     Valcyte ppx resumed 6/24 with initiation of stress dose steroids.   Blood culture 6/24 x2 - NGTD  Last BM two days ago.    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      History obtained from chart review, patient's , and patient. Reported increased lethargy reported for 2-3 days PTA, poor PO intake, more tired, and weak cough. Unable to bring up sputum. Denied fever, chills, urinary symptoms (on HD), diarrhea, abdominal pain at home. Her and  both reported sinus congestion recently,  improved quickly and Kecia declined. Denies sore throat, headache. Afebrile and without leukocytosis on admission. WBC 4.0, ESR 10, lactic 0.6, and CRP ~8. Put on BIPAP given respiratory acidosis with some improvement, however unable to tolerate off BIPAP and intubated for failure to improve hypercapnia. Started on azithromycin, vancomycin, and zosyn. Negative: RVP, flu/covid/rsv, MRSA nares, Cryptococcus antigen. Pending: EBV, CMV, blood culture, fungitell, Histo Ag, AGM, and sputum culture     CT chest-hi res on 6/18/24 notable for slight decreased confluent consolidative opacities in right lower lobe with more micronodular component of possible infection in the right lower lobe. Other abnormalities throughout the lungs similar to February of the transplanted lungs - pleural effusions, anastomoses intact with narrowing of R mainstem anastomosis. New narrowing of right bronchus intermedius with occlusion of RML bronchus (mucus plug per pulm). Febrile 100.7F following intubation, WBC 3.2, CRP up to 19, procal 0.24 in ESRD, nl lactic. Remains intubated, though on PS. On pressor x1 norepi, low dose 0.05. Nursing reports lots of secretion burden and weak cough. Planning for bronchoscopy today.     Has previously scheduled stent placement on 6/26/24 as outpatient.     ROS:  As mentioned in the interim history otherwise negative by reviewing constitutional symptoms, central and peripheral neurological systems,  respiratory system, cardiac system, GI system,  system, musculoskeletal, skin, allergy, and lymphatics.                 Physical Examination:  Temp: 99.1  F (37.3  C) Temp src: Axillary BP: (!) 83/46 (does not correlate to art line) Pulse: (!) 136   Resp: 24 SpO2: (!) 86 % O2 Device: Mechanical Ventilator    Vitals:    06/21/24 1700 06/22/24 0200 06/23/24 0500 06/24/24 0400   Weight: 57.6 kg (126 lb 15.8 oz) 57.6 kg (126 lb 15.8 oz) 57.7 kg (127 lb 3.3 oz) 56.7 kg (125 lb)    06/25/24 0130   Weight: 57.4 kg (126 lb 8.7 oz)     Constitutional: awake but confused, orally intubated.  Lungs: Lungs coarse bilaterally, upper R > L.   CVS: RRR, normal S1/S2  GI: No abdominal tenderness  Extremities: 1-2+ edema  Neuro: follows commands, moves all extremities    Medications:  Medications that Require Transfusion:   Current Facility-Administered Medications   Medication Dose Route Frequency Provider Last Rate Last Admin    dexmedeTOMIDine (PRECEDEX) 4 mcg/mL in sodium chloride 0.9 % 100 mL infusion  0.1-1.2 mcg/kg/hr (Dosing Weight) Intravenous Continuous Chio Cortez MD 14 mL/hr at 06/25/24 0914 1.1 mcg/kg/hr at 06/25/24 0914    dextrose 10% infusion   Intravenous Continuous PRN Velez Reyes, German, MD        dextrose 10% infusion   Intravenous Continuous PRN Awa Watt MD        dialysate for CVVHD & CVVHDF (Phoxillum BK4/2.5)  12.5 mL/kg/hr CRRT Continuous Jonna Rodas  mL/hr at 06/25/24 0634 12.5 mL/kg/hr at 06/25/24 0634    fentaNYL (SUBLIMAZE) infusion   mcg/hr Intravenous Continuous Velez Reyes, German, MD 2 mL/hr at 06/25/24 0900 100 mcg/hr at 06/25/24 0900    heparin (porcine) 20,000 units in 20 mL ANTICOAGULANT infusion (syringe from pharmacy)  500 Units/hr CRRT Continuous Hardy Tran APRN CNS 0.5 mL/hr at 06/25/24 0900 500 Units/hr at 06/25/24 0900    insulin regular (MYXREDLIN) 1 unit/mL infusion  0-24 Units/hr Intravenous Continuous Velez Reyes, German, MD         phenylephrine (DANDY-SYNEPHRINE) 50 mg in NaCl 0.9 % 250 mL infusion  0.1-6 mcg/kg/min (Dosing Weight) Intravenous Continuous Chio Cortez MD 15.2 mL/hr at 06/25/24 0943 1 mcg/kg/min at 06/25/24 0943    POST-filter replacement solution for CVVHD & CVVHDF (Phoxillum BK4/2.5)   CRRT Continuous Jonna Rodas  mL/hr at 06/23/24 1633 New Bag at 06/23/24 1633    PRE-filter replacement solution for CVVHD & CVVHDF (Phoxillum BK4/2.5)  12.5 mL/kg/hr CRRT Continuous Jonna Rodas  mL/hr at 06/25/24 0634 12.5 mL/kg/hr at 06/25/24 0634    vasopressin 1 unit/mL MAX Conc (PITRESSIN) infusion  2.4 Units/hr Intravenous Continuous Chio Cortez MD 2.4 mL/hr at 06/25/24 0945 2.4 Units/hr at 06/25/24 0945     Scheduled Medications:   Current Facility-Administered Medications   Medication Dose Route Frequency Provider Last Rate Last Admin    acetylcysteine (MUCOMYST) 10 % nebulizer solution 4 mL  4 mL Inhalation BID Velez Reyes, German, MD   4 mL at 06/25/24 0845    azithromycin (ZITHROMAX) tablet 250 mg  250 mg Oral or Feeding Tube Daily Velez Reyes, German, MD   250 mg at 06/25/24 0824    B and C vitamin Complex with folic acid (NEPHRONEX) liquid 5 mL  5 mL Per Feeding Tube Daily Awa Watt MD   5 mL at 06/25/24 0824    calcium carbonate (TUMS) chewable tablet 500 mg  500 mg Oral Once per day on Sunday Tuesday Thursday Saturday Josesito Pratt MD   500 mg at 06/25/24 0910    cefTAZidime (FORTAZ) 2 g vial to attach to  ml bag for ADULTS or NS 50 ml bag for PEDS  2 g Intravenous Q8H Chio Cortez MD   2 g at 06/25/24 0400    chlorhexidine (PERIDEX) 0.12 % solution 15 mL  15 mL Mouth/Throat Q12H Chio Cortez MD   15 mL at 06/25/24 0824    fiber modular (NUTRISOURCE FIBER) packet 1 packet  1 packet Per Feeding Tube Daily Awa Watt MD   1 packet at 06/25/24 0824    fludrocortisone (FLORINEF) tablet 0.1 mg  0.1 mg Oral or Feeding Tube Daily Evelyn Roberts APRN CNP   0.1  mg at 06/25/24 0826    hydrocortisone sodium succinate PF (solu-CORTEF) injection 50 mg  50 mg Intravenous Q6H Evelyn Roberts APRN CNP   50 mg at 06/25/24 0357    levalbuterol (XOPENEX) neb solution 0.63 mg  0.63 mg Nebulization 4x Daily Gareth Mosquera MD   0.63 mg at 06/25/24 0845    linezolid (ZYVOX) infusion 600 mg  600 mg Intravenous Q12H Chio Cortez MD   600 mg at 06/25/24 0028    [Held by provider] magnesium chloride CR tablet 1,070 mg  1,070 mg Oral Once per day on Sunday Tuesday Thursday Saturday Josesito Pratt MD        melatonin tablet 3 mg  3 mg Oral or Feeding Tube At Bedtime Velez Reyes, German, MD   3 mg at 06/24/24 1938    [Held by provider] metoprolol tartrate (LOPRESSOR) tablet 25 mg  25 mg Oral BID Velez Reyes, German, MD        micafungin (MYCAMINE) 150 mg in sodium chloride 0.9 % 100 mL intermittent infusion  150 mg Intravenous Q24H Chio Cortez  mL/hr at 06/25/24 0126 150 mg at 06/25/24 0126    minocycline (MINOCIN) 100 mg in sodium chloride 0.9 % 100 mL intermittent infusion  100 mg Intravenous Q12H Kajal Chong APRN  mL/hr at 06/24/24 2328 100 mg at 06/24/24 2328    montelukast (SINGULAIR) tablet 10 mg  10 mg Oral At Bedtime Velez Reyes, German, MD   10 mg at 06/24/24 2118    pantoprazole (PROTONIX) 2 mg/mL suspension 40 mg  40 mg Per Feeding Tube Daily Josesito Pratt MD   40 mg at 06/24/24 1215    posaconazole (NOXAFIL) DR tablet TBEC 300 mg  300 mg Per Feeding Tube Daily Josesito Pratt MD   300 mg at 06/24/24 1214    [Held by provider] predniSONE (DELTASONE) tablet 5 mg  5 mg Oral Daily Velez Reyes, German, MD   5 mg at 06/21/24 0822    protein modular (PROSOURCE TF20) packet 1 packet  1 packet Per Feeding Tube BID Awa Watt MD   1 packet at 06/25/24 0824    QUEtiapine (SEROquel) tablet 100 mg  100 mg Oral or Feeding Tube At Bedtime Velez Reyes, German, MD   100 mg at 06/24/24 2118    QUEtiapine (SEROquel) tablet 50 mg  50 mg Oral or  "Feeding Tube BID Awa Watt MD   50 mg at 06/25/24 0824    sodium chloride (NEBUSAL) 3 % neb solution 3 mL  3 mL Nebulization BID Velez Reyes, German, MD   3 mL at 06/24/24 2054    sodium chloride (PF) 0.9% PF flush 10 mL  10 mL Intracatheter Q8H Kajal Chong, APRN CNP   10 mL at 06/25/24 0834    sodium chloride (PF) 0.9% PF flush 10 mL  10 mL Intracatheter Q8H Kajal Chong APRN CNP   10 mL at 06/25/24 0827    sulfamethoxazole-trimethoprim (BACTRIM) 400-80 MG per tablet 1 tablet  1 tablet Oral or Feeding Tube Daily Josesito Pratt MD   1 tablet at 06/24/24 1938    tacrolimus (GENERIC) suspension 0.4 mg  0.4 mg Oral or Feeding Tube QAM Yenni Graham MD   0.4 mg at 06/25/24 0824    And    tacrolimus (GENERIC) suspension 0.4 mg  0.4 mg Oral or Feeding Tube QPM Yenni Graham MD   0.4 mg at 06/24/24 1855    valGANciclovir (VALCYTE) solution 450 mg  450 mg Per Feeding Tube Daily Rufina Huff, CNP   450 mg at 06/25/24 0824    Vitamin D3 (CHOLECALCIFEROL) tablet 50 mcg  50 mcg Oral Once per day on Monday Wednesday Friday Velez Reyes, German, MD   50 mcg at 06/24/24 0759         Laboratory Data:   No results found for: \"ACD4\"    Inflammatory Markers    Recent Labs   Lab Test 06/18/24  1418 10/29/23  0642 10/28/23  0725 10/07/19  1221 10/06/19  1444 09/13/19  0934 09/11/19  2325 08/22/19  0820   SED 10 66* 58* 93*  --  111* 102*  --    CRP  --   --   --   --  25.0* 58.0* 66.0* 47.0*       Immune Globulin Studies     Recent Labs   Lab Test 06/19/24  0839 10/27/23  0701 10/24/23  1033 09/15/21  0915 01/31/21  0441 01/25/21  0406 10/27/19  0621 03/01/18  0356 02/19/18  0759    979  --  1,198 763  --  936 698 1,790*   IGM  --   --   --   --   --   --   --   --  502*   IGE  --   --  <2  --   --  <2  --   --  <2   IGA  --   --   --   --   --   --   --   --  425*   IGG1  --   --   --   --   --   --   --   --  1,300*   IGG2  --   --   --   --   --   --   --   --  131*   IGG3  --   --   --   --   --   " --   --   --  101   IGG4  --   --   --   --   --   --   --   --  1*       Metabolic Studies       Recent Labs   Lab Test 06/25/24  0948 06/25/24  0840 06/25/24  0350 06/25/24  0348 06/24/24  2331 06/24/24  1942 06/24/24  1816 06/24/24  1614 06/24/24  1610 06/24/24  0825 06/24/24  0335 06/24/24  0013 06/23/24 2006 06/23/24  1628 06/23/24  1626 06/23/24  0745 06/23/24  0414 06/22/24  1614 06/22/24  1613 06/21/24  1208 06/21/24  0356   NA  --   --   --  136  --   --   --   --  137  --  135  --   --   --  137  --  139  --  139   < > 134*  134*   POTASSIUM  --   --   --  4.1  --   --   --   --  4.3  --  4.1  --   --   --  4.2  --  4.2  --  3.9   < > 3.6  3.6   CHLORIDE  --   --   --  105  --   --   --   --  106  --  106  --   --   --  107  --  109*  --  109*   < > 103  103   CO2  --   --   --  22  --   --   --   --  22  --  23  --   --   --  23  --  22  --  21*   < > 20*  20*   ANIONGAP  --   --   --  9  --   --   --   --  9  --  6*  --   --   --  7  --  8  --  9   < > 11  11   BUN  --   --   --  40.7*  --   --   --   --  38.0*  --  34.4*  --   --   --  29.7*  --  27.0*  --  28.0*   < > 32.3*  32.3*   CR  --   --   --  1.02*  --   --   --   --  1.02*  --  1.02*  --   --   --  1.04*  --  1.15*  --  1.39*   < > 2.72*  2.72*   GFRESTIMATED  --   --   --  62  --   --   --   --  62  --  62  --   --   --  61  --  54*  --  43*   < > 19*  19*   * 225* 258* 279* 239* 220*  --    < > 255*   < > 207*   < >  --    < > 202*   < > 172*  167*   < > 185*   < > 158*  155*  155*   A1C  --   --   --  5.1  --   --   --   --   --   --   --   --   --   --   --   --   --   --   --   --   --    CHELSEY  --   --   --  8.0*  --   --   --   --  8.0*  --  8.1*  --   --   --  8.1*  --  8.0*  --  7.9*   < > 7.9*  7.9*   PHOS  --   --   --  3.9  --   --   --   --  4.1  --  3.3  --   --   --  3.4  --  3.5  --  2.4*   < > 2.6   MAG  --   --   --  2.4*  --   --   --   --  2.4*  --  2.4*  --   --   --  2.3  --  2.3  --  2.4*   < > 2.2    LACT  --   --   --   --   --   --  0.9  --   --   --   --   --  0.9  --  0.7   < >  --   --   --    < >  --    CKT  --   --   --   --   --   --   --   --   --   --  24*  --   --   --   --   --   --   --   --   --  23*    < > = values in this interval not displayed.       Hepatic Studies    Recent Labs   Lab Test 06/25/24  0348 06/24/24  1610 06/24/24  0335 06/23/24  1626 06/23/24  0414 06/22/24  1613 06/22/24  0439 06/21/24  1626 06/21/24  0356 06/20/24  1506 06/20/24  0420 06/19/24  0613 06/18/24  1803 09/15/21  0915 05/01/21  0654 01/27/21  0414 01/26/21  0425   BILITOTAL 0.7  --  0.7  --  0.8  --  1.0  --  1.1  --  0.9   < > 0.6   < >  --    < > 0.8   ALKPHOS 114  --  79  --  78  --  89  --  123  --  153*   < > 235*   < >  --    < > 184*   ALBUMIN 2.4* 2.5* 2.4* 2.4* 2.4* 2.3* 2.3*  2.3*   < > 2.4*  2.4*   < > 2.7*   < > 3.6   < >  --    < > 2.0*   AST 16  --  12  --  17  --  11  --  12  --  26   < > 39   < >  --    < > 11   ALT 28  --  18  --  20  --  17  --  20  --  23   < > 39   < >  --    < > 30   LDH  --   --   --   --   --   --   --   --   --   --   --   --   --   --  164  --  187   GGT  --   --   --   --   --   --   --   --   --   --   --   --  147*  --   --   --   --     < > = values in this interval not displayed.       Pancreatitis testing    Recent Labs   Lab Test 06/18/24  1803 10/25/23  1356 05/26/22  0750 09/15/21  0915 01/24/21  0000 02/19/20  0713 12/31/19  1033 10/24/19  2032 10/21/19  1451 10/06/19  1444 09/11/19  2325 03/04/18  0353 02/19/18  0759   AMYLASE  --   --   --   --   --   --   --   --   --   --   --   --  58   LIPASE 40 24  --   --   --   --   --  212 119 172 127  --   --    TRIG  --   --  155* 68 242* 77 98  --   --   --   --  191* 115       Hematology Studies      Recent Labs   Lab Test 06/25/24  0348 06/24/24  0335 06/23/24  0414 06/22/24  1615 06/22/24  0439 06/22/24  0034 06/21/24  0356 06/20/24  0420   WBC 3.0* 3.6* 5.4 6.0 6.0 6.4 5.8 6.0   ANEU 1.4* 2.8 4.6  --  5.2  --   "5.1 5.3   ALYM 1.1 0.3* 0.1*  --  0.0*  --  0.1* 0.1*   SHERRI 0.3 0.4 0.4  --  0.7  --  0.5 0.5   AEOS 0.0 0.0 0.2  --  0.1  --  0.1 0.1   HGB 7.0* 7.5* 7.8* 8.0* 8.1* 8.4* 9.6* 10.4*   HCT 23.0* 23.7* 24.9* 25.3* 25.9* 27.2* 29.8* 32.2*   PLT 70* 69* 84* 89* 95* 84* 98* 109*       Arterial Blood Gas Testing    Recent Labs   Lab Test 06/24/24  2126 06/24/24  1653 06/24/24  0335 06/23/24  1639 06/23/24  1143 06/23/24  0906   PH 7.33* 7.30* 7.37 7.33*  --  7.32*   PCO2 45 50* 41 46*  --  47*   PO2 133* 80 123* 80  --  63*   HCO3 24 25 24 24  --  24   O2PER 60 60 50 50   < > 50  50    < > = values in this interval not displayed.        Urine Studies     Recent Labs   Lab Test 06/19/24  1214 01/24/21  1729 10/21/19  2240 09/12/19  0125 08/07/19  1512   URINEPH 6.5 5.0 5.0 7.5* 7.0   NITRITE Negative Negative Negative Negative Negative   LEUKEST Large* Moderate* Large* Negative Trace*   WBCU >182* 34* 115* 3 19*       Vancomycin Levels     Recent Labs   Lab Test 06/19/24  0613 10/26/23  0606 09/21/21  1911 01/28/21  0412 01/26/21  0425 01/25/21  1259   VANCOMYCIN 15.6 18.6 18.8 21.6 31.4* 15.1       Tobramycin levels     No lab results found.    Gentamicin levels    No lab results found.    Tacrolimus levels    Invalid input(s): \"TACROLIMUS\", \"TAC\", \"TACR\"      Latest Ref Rng & Units 6/25/2024     3:48 AM 6/24/2024     4:10 PM 6/24/2024     3:35 AM 6/23/2024     4:26 PM 6/23/2024     4:14 AM   Transplant Immunosuppression Labs   Creat 0.51 - 0.95 mg/dL 1.02  1.02  1.02  1.04  1.15    Urea Nitrogen 8.0 - 23.0 mg/dL 40.7  38.0  34.4  29.7  27.0    WBC 4.0 - 11.0 10e3/uL 3.0   3.6   5.4    Neutrophil % 48   78   85    ANEU 1.6 - 8.3 10e3/uL 1.4   2.8   4.6        Cyclosporine levels    Invalid input(s): \"CYCLOSPORINE\", \"CYC\"    Mycophenolate levels    Invalid input(s): \"MYPA\", \"MYP\"    Sirolimus levels    Invalid input(s): \"SIROLIMUS\", \"SIR\", \"RAPA\"    CSF testing   No lab results found.      Microbiology:   6/19  CrAg - " negative  Urine Strep and Legionella antigens - negative  Urine culture - GNB  BAL - negative KOH, pending aerobic + fungal + AFB cultures    6/18  Negative RVP and COVID/flu/RSV  Histoplasma antigen (blood) - pending  Aspergillus galactomannan - negative  Fungitell - negative  Blood cultures x2 - NGTD    Sputum culture - Stenotrophomonas maltophilia      Fungal testing  Recent Labs   Lab Test 06/21/24  1706 06/19/24  1513 06/18/24  1803 10/07/19  1544 09/14/19  1625   FGTL  --   --  <31   < > 122   FGTLI  --   --  Negative   < > Positive*   PJRDFA  --  Not Detected  --    < >  --    ASPGAI 0.06 1.87 0.03   < > 1.08   ASPAG Negative Positive*  --    < >  --    ASPGAA  --   --  Negative   < > Positive*   CIABB  --   --   --   --  <1:2   COFUNG  --   --  <1:2  --   --     < > = values in this interval not displayed.       Last Culture results   S Pneumoniae Antigen   Date Value Ref Range Status   01/24/2021   Final    Negative, no Streptococcus pneumoniae antigen detected by immunochromatographic membrane   assay. A negative Streptococcus pneumoniae antigen result does not rule out infection with   Streptococcus pneumoniae.       Streptococcus pneumoniae antigen   Date Value Ref Range Status   06/19/2024 Negative Negative Final     Comment:     A negative Streptococcus pneumoniae antigen result does not rule out infection with Streptococcus pneumoniae.     P. jirovecii By PCR   Date Value Ref Range Status   06/19/2024 Not Detected  Final     Comment:       NOT DETECTED - A negative result does not rule out the   presence of PCR inhibitors in the patient specimen or assay   specific nucleic acid in concentrations below the level of   detection by the assay.    This test was developed and its performance characteristics   determined by "[employer]. It has not been cleared or   approved by the [place] Food and Drug Administration. This test   was performed in a CLIA certified laboratory and is   intended for clinical  purposes.  Performed By: [facility] [employer]  [address] [place] [address]  : Rogelio Llanos MD, PhD  CLIA Number: [id number]   10/17/2023 Not Detected  Final     Comment:     NOT DETECTED - A negative result does not rule out the   presence of PCR inhibitors in the patient specimen or   assay specific nucleic acid in concentrations below the   level of detection by the assay.    This test was developed and its performance characteristics   determined by [email or web address]. It has not been cleared or   approved by the [place] Food and Drug Administration. This test   was performed in a CLIA certified laboratory and is   intended for clinical purposes.  Performed By: [facility] [employer]  [address] [place] [address]  : Rogelio Llanos MD, PhD  CLIA Number: [id number]   08/17/2023 Not Detected  Final     Comment:     NOT DETECTED - A negative result does not rule out the   presence of PCR inhibitors in the patient specimen or   assay specific nucleic acid in concentrations below the   level of detection by the assay.    This test was developed and its performance characteristics   determined by [facility]. It has not been cleared or   approved by the US Food and Drug Administration. This test   was performed in a CLIA certified laboratory and is   intended for clinical purposes.  Performed By: [facility] [employer]  [address] [place] [address]  : Rogelio Llanos MD, PhD  CLIA Number: [id number]   08/17/2023 Not Detected  Final     Comment:     NOT DETECTED - A negative result does not rule out the   presence of PCR inhibitors in the patient specimen or   assay specific nucleic acid in concentrations below the   level of detection by the assay.    This test was developed and its performance characteristics   determined by [facility]. It has not been cleared or   approved by the US Food and Drug Administration.  This test   was performed in a CLIA certified laboratory and is   intended for clinical purposes.  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  Results should be used to aid in the diagnosis of PCP  pneumonia and must be interpreted in the context of host  risk factors, clinical presentation, and radiographic  imaging.  TEST INFORMATION: Pneumocystis jirovecii Detection by PCR  Test developed and characteristics determined by [email or web address]. See Compliance Statement B: [facility]/  Performed by [email or web address],  [address] [place] [id number] [phone]  [email or web address] Carri Red MD, Lab. Director       Culture   Date Value Ref Range Status   06/24/2024 No growth after 1 day  Preliminary   06/24/2024 No growth after 1 day  Preliminary   06/21/2024 1+ Stenotrophomonas maltophilia (A)  Preliminary     Comment:     Susceptibilities done on previous cultures   06/21/2024 1+ Enterococcus faecium VRE (A)  Preliminary     Comment:     Preliminary result, confirmation pending.   06/21/2024 1+ Enterococcus faecalis (A)  Preliminary     Comment:     Susceptibilities done on previous cultures   06/21/2024 No growth after 3 days  Preliminary   06/21/2024 No growth after 3 days  Preliminary   06/21/2024 No growth after 3 days  Preliminary   06/19/2024 1+ Stenotrophomonas maltophilia (A)  Final   06/19/2024 1+ Enterococcus faecalis (A)  Final   06/19/2024 Yeast (A)  Preliminary   06/19/2024 Filamentous fungus (A)  Preliminary   06/19/2024 Culture in progress  Preliminary   06/19/2024 >100,000 CFU/mL Escherichia coli (A)  Preliminary   06/19/2024 >100,000 CFU/mL Enterococcus faecium VRE (A)  Preliminary   06/19/2024 50,000-100,000 CFU/mL Escherichia coli (A)  Preliminary   06/18/2024 3+ Normal alo   Final   06/18/2024 2+ Stenotrophomonas maltophilia (A)  Final   06/18/2024 2+ Stenotrophomonas maltophilia (A)  Final   06/18/2024 No Growth  Final     GS Culture   Date Value Ref Range Status   06/21/2024 See corresponding culture for results  Final   06/19/2024 See corresponding culture for results  Final     Culture Micro   Date Value Ref Range Status   05/01/2021   Final    Quantity not sufficient  Called to Maxim Norman at 1236 5/1/21.    mlb     05/01/2021 Culture negative after 30 days  Final   04/29/2021 No anaerobes isolated  Final   04/29/2021 No growth  Final   02/19/2021 Culture negative for acid fast bacilli  Final   02/19/2021   Final    Assayed at [facility] [address] [place] [id number] [phone]   02/19/2021 Culture negative after 4 weeks  Final   02/19/2021 No growth after 4 weeks  Final   02/19/2021 Moderate growth  Staphylococcus epidermidis   (A)  Final   02/09/2021 No growth  Final         Last check of C difficile  C Diff Toxin B PCR   Date Value Ref Range Status   02/13/2021 Negative NEG^Negative Final     Comment:     Negative: C. difficile target DNA sequences NOT detected, presumed negative   for C.difficile toxin B or the number of bacteria present may be below the   limit of detection for the test.  FDA approved assay performed using [employer] GeneXpert real-time PCR.  A negative result does not exclude actual disease due to C. difficile and may   be due to improper collection, handling and storage of the specimen or the   number of organisms in the specimen is below the detection limit of the assay.       C Difficile Toxin B by PCR   Date Value Ref Range Status   06/22/2024 Negative Negative Final     Comment:     A negative result does not exclude actual disease due to C. difficile and may be due to improper collection, handling and storage of the specimen or the number of organisms in the specimen is below the detection limit of the assay.        Virology:  Coronavirus-19 testing    Recent Labs   Lab Test 06/18/24  1729 06/18/24  1433 06/17/24  1337 07/11/23  1132 10/12/22  1528 09/26/22  0956 05/23/22  1013 04/01/22  1257 02/07/22  1301 01/31/22  1309 01/24/22  1324 09/20/21  1859 06/07/21  1310 05/02/21  0715 04/26/21  1151 04/08/21  0723   [id number]  --   --   --   --   --   --   --   --   --   --   --   --   --  Negative  --   --    [id number]  --   --   --   --   --   --   --   --   --   --   --   --   --  Not Applicable  --   --    [id number] Negative Negative  --  Negative  --   --   --   --   --   --   --  Negative   < >  --   --  Test received-See reflex to IDDL test SARS CoV2 (COVID-19) Virus RT-PCR  NEGATIVE   [id number]  --   --   --   --   --   --   --   --   --   --   --   --   --   --   --  Nasopharyngeal   [id number]  --   --   --   --   --   --   --   --   --   --   --   --   --   --   --  Nasopharyngeal   COVIDPCREXT  --   --  Negative  --  Negative Negative Undetected   < > Undetected Undetected   < >  --    < >  --   --   --    SOUREXT  --   --   --   --   --   --  Nasopharynx  --  Nasopharynx Nasopharynx   < >  --    < >  --    < >  --    [id number]  --   --   --   --   --   --  Undetected  --  Undetected Undetected   < >  --    < >  --    < >  --     < > = values in this interval not displayed.       Respiratory virus (non-coronavirus-19) testing    Recent Labs   Lab Test 06/18/24 1729 06/18/24  1433 11/01/23  0920 10/27/23  0449 01/24/21  1822 01/24/21  1629 12/23/20  1102 02/27/18  1016 02/22/18  0900   RVSPEC  --   --   --   --   --  Bronchial Bronchial   < >  --    AFLU  --   --   --   --   --   --   --   --  Duplicate request*   IFLUA Not Detected  --  Not Detected Not Detected   < > Negative Negative   < >  --    INFZA Negative   < >  --   --   --   --   --   --  Negative   FLUAH1 Not Detected  --  Not Detected Not Detected   < > Negative Negative   < >  --    IG1818 Not Detected  --  Not Detected Not Detected   < >  Negative Negative   < >  --    FLUAH3 Not Detected  --  Not Detected Not Detected   < > Negative Negative   < >  --    BFLU  --   --   --   --   --   --   --   --  Duplicate request*   IFLUB Not Detected  --  Not Detected Not Detected   < > Negative Negative   < >  --    INFZB Negative   < >  --   --   --   --   --   --  Negative   PIV1 Not Detected  --  Not Detected Not Detected   < > Negative Negative   < >  --    PIV2 Not Detected  --  Not Detected Not Detected   < > Negative Negative   < >  --    PIV3 Not Detected  --  Not Detected Not Detected   < > Negative Negative   < >  --    PIV4 Not Detected  --  Not Detected Not Detected   < >  --   --    < >  --    IRSV Negative   < >  --   --   --   --   --   --  Negative   HRVS  --   --   --   --   --  Negative Negative   < >  --    RSVA Not Detected  --  Not Detected Not Detected   < > Negative Negative   < >  --    RSVB Not Detected  --  Not Detected Not Detected   < > Negative Negative   < >  --    HMPV Not Detected  --  Not Detected Not Detected   < > Negative Negative   < >  --    ADVBE  --   --   --   --   --  Negative Negative   < >  --    ADVC  --   --   --   --   --  Negative Negative   < >  --    ADENOV Not Detected  --  Not Detected Not Detected   < >  --   --    < >  --    CORONA Not Detected  --  Not Detected Not Detected   < >  --   --    < >  --     < > = values in this interval not displayed.       CMV viral loads    Recent Labs   Lab Test 06/18/24  1803 06/12/24  0828 03/13/24  0853 03/05/24  0925 02/21/24  0838 02/14/24  1050 12/27/23  0834 12/19/23  1138 11/29/23  0826 11/21/23  0752 11/08/23  0846 11/01/23  0920 10/31/23  0606 10/24/23  1033 10/17/23  1011 10/04/23  0810 09/07/23  0710 08/17/23  1144 08/17/23  1137 08/08/23  0828 08/08/23  0828 07/11/23  0952 05/26/23  0828 04/06/23  0725 03/08/23  0848 02/09/23  1034 11/18/22  0928 09/27/22  1012 03/15/21  0904 02/25/21  0542   CMVQNT <35*  --   --  Not Detected  --   --   --  Not Detected  --   "Not Detected  --   --  <35*  --   --   --   --   --   --   --   --  Not Detected  --  Not Detected  --  <137*  --  Not Detected   < > CMV DNA Not Detected   CMVRESINST  --   --   --   --   --  350*  --   --   --   --   --   --   --  103*  80* 75*  --  521*  --   --   --  46*  --   --   --   --   --   --   --   --   --    CSPEC  --   --   --   --   --   --   --   --   --   --   --   --   --   --   --   --   --   --   --   --   --   --   --   --   --   --   --   --   --  EDTA PLASMA   CMVLOG <1.5  --   --   --   --  2.5  --   --   --   --   --   --  <1.5 2.0  1.9 1.9  --  2.7  --   --    < > 1.7  --   --   --   --  <2.1  --   --    < > Not Calculated   [address]  --  46*   < >  --    < >  --    < >  --    < >  --    < >  --   --   --   --    < >  --   --   --   --   --   --    < >  --    < >  --    < >  --    < >  --    CMVQAL  --   --   --   --   --   --   --   --   --   --   --  Not Detected  --   --   --   --   --  Not Detected Not Detected  --   --   --   --   --   --   --   --   --   --   --     < > = values in this interval not displayed.       No results found for: \"H6RES\"    EBV DNA Copies/mL   Date Value Ref Range Status   05/01/2021 38,186 (A) EBVNEG^EBV DNA Not Detected [Copies]/mL Final   02/22/2021 75,709 (A) EBVNEG^EBV DNA Not Detected [Copies]/mL Final   01/25/2021 5,619 (A) EBVNEG^EBV DNA Not Detected [Copies]/mL Final   12/09/2020 10,686 (A) EBVNEG^EBV DNA Not Detected [Copies]/mL Final   09/09/2020 2,935 (A) EBVNEG^EBV DNA Not Detected [Copies]/mL Final   03/09/2020 8,918 (A) EBVNEG^EBV DNA Not Detected [Copies]/mL Final   02/19/2020 38,419 (A) EBVNEG^EBV DNA Not Detected [Copies]/mL Final   12/18/2019 11,933 (A) EBVNEG^EBV DNA Not Detected [Copies]/mL Final   11/11/2019 9,669 (A) EBVNEG^EBV DNA Not Detected [Copies]/mL Final   10/24/2019 13,391 (A) EBVNEG^EBV DNA Not Detected [Copies]/mL Final   08/01/2018 EBV DNA Not Detected EBVNEG^EBV DNA Not Detected [Copies]/mL Final       BK viral loads   Recent " Labs   Lab Test 08/07/19  0959   BKSPEC Plasma   BKRES BK Virus DNA Not Detected         Imaging:  CXR 6/24/24  Impression:   1. Overall improved aeration in the left lung with persistent  opacities in the left lung and right lung base, likely improving  pulmonary edema or infection.  2. Stable support devices.    XR Chest Port 1 View  Result Date: 6/20/2024  Impression: Bilateral pulmonary opacities, mildly increased in the upper lobes suggesting increased edema and underlying infection. The endotracheal tube projects 2.3 cm above the gee.    XR Chest Port 1 View  Result Date: 6/19/2024  IMPRESSION: ET tube tip projects 1.6 cm above the gee. Small bilateral pleural effusions with bibasilar, right greater than left airspace opacities suspicious for infection versus atelectasis.     CT Chest Hi-Resolution wo Contrast  Result Date: 6/18/2024  IMPRESSION: Bilateral transplanted lungs. Slight decreased confluent consolidative opacities in right lower lobe with more micronodular component of possible infection in the right lower lobe. Other abnormalities throughout the lungs similar to February of the transplanted lungs. Pleural effusions again noted. Narrowing of the bronchus intermedius on the right there appears to be occlusion of the right middle lobe bronchus with narrowing of the distal aspect of the right lower lobe bronchus with short segment occlusions of the medial and posterior basilar bronchial segments to the right lower lobe. No such abnormality on the left.. No ectopic air. Anastomoses appear grossly intact bilaterally with just under approximate 50% narrowing distal to the right mainstem anastomosis proximal to the upper lobe bronchus takeoff. This right middle lobe bronchial occlusion is new from February. Mitral annular calcifications with left atrial prominence, please correlate for arrhythmia. Borderline pulmonary enlargement concerning for possible pulmonary hypertension. Borderline mid  ascending aortic ectasia.       ECHO:  Echo Complete  Result Date: 6/19/2024  Interpretation Summary Global and regional left ventricular function is normal with an EF of 55-60%. Right ventricular function, chamber size, wall motion, and thickness are normal. The inferior vena cava cannot be assessed. No pericardial effusion is present. No significant valvular abnormalities present.     DAVIDA RAMAN PA-C  [facility]  Contact information available via [facility] Paging/Directory     General

## 2024-06-25 NOTE — PROVIDER NOTIFICATION
RN turned pt at 0130 and pt woke up and went into afib with RVR with rates 130-160's at 0133, MD called and EKG ordered.  Titrating down pressors as pt awake and restless with MAP in 70's/ 80's.

## 2024-06-25 NOTE — PROGRESS NOTES
ICU Daily Rounding Checklist     Checklist Response Notes   Can sedation be reduced?  Yes  Wean Precedex as able   Can analgesia be reduced? Yes  Fentanyl PRN   Is delirium being assessed, addressed and prevented? Yes  Schedule Seroquel 150 mg at bedtime   Spontaneous awakening trial and/or Spontaneous breathing trial candidate?  Yes  PST today    Total fluid balance goal reviewed?  Yes Target Goals:        On CRRT, goal net negative 1L / 24 hours today   Is the patient at goals for lung protective ventilation? Yes    Head of bed elevation (30 degrees)? Yes    Skin breakdown assessment (prevention) completed? Yes    Is enteral nutrition at goal? Yes  Goal rate via post pyloric feeding tube   Is blood glucose at goal? Yes    Deep venous thrombosis prophylaxis? Yes Heparin infusion      Gastric ulcer prophylaxis?  Yes If coagulopathy (INR-1.5 PTT2x normal. Ph<50k), mechanical ventilation 48hr, history of GI bleed/ulcer within past year. TBL, SCI, or burn, or if >= 2 minor risk factors (sepsis, ICU stay 1 week, occult GI bleed > 6 days. glucocorticoid therapy, NSAID use, antiplatelet use)   Can Antibiotics be narrowed or discontinued? No  Continue current antimicrobials per transplant ID recs    Early mobility candidate and physical therapy consulted? Yes    Is rivas catheter needed? NA    Is central venous/arterial catheter needed? Yes  Trialysis line for CRRT and pressors   Has the family been updated? Yes    Are the patient's goals of care and code status current? Yes

## 2024-06-25 NOTE — PROGRESS NOTES
CRRT STATUS NOTE    DATA:  Time:  1727  Pressures WNL:  YES  Filter Status:  WDL    Problems Reported/Alarms Noted:  none    Supplies Present:  YES    ASSESSMENT:  Patient Net Fluid Balance:  Net + 69 ml since MN 6/25/24    Vital Signs:  Temp: 99.8  F (37.7  C) Temp src: Oral BP: (!) 83/46 (does not correlate to art line) Pulse: 80   Resp: 19 SpO2: 95 % O2 Device: Mechanical Ventilator      Pt is on 2 pressors.      Labs:   Recent Labs   Lab 06/25/24  1557 06/25/24  1455 06/25/24  1337 06/25/24  0350 06/25/24  0348 06/24/24  1614 06/24/24  1610 06/24/24  0825 06/24/24  0335 06/23/24  0745 06/23/24  0414 06/18/24  1834 06/18/24  1803   WBC  --   --   --   --  3.0*  --   --   --  3.6*  --  5.4   < > 4.7   HGB  --   --   --   --  7.0*  --   --   --  7.5*  --  7.8*   < > 12.1   MCV  --   --   --   --  101*  --   --   --  98  --  100   < > 103*   PLT  --   --   --   --  70*  --   --   --  69*  --  84*   < > 123*   NA  --   --   --   --  136  --  137  --  135   < > 139   < > 137   POTASSIUM  --   --   --   --  4.1  --  4.3  --  4.1   < > 4.2   < > 4.1   CHLORIDE  --   --   --   --  105  --  106  --  106   < > 109*   < > 100   CO2  --   --   --   --  22  --  22  --  23   < > 22   < > 27   BUN  --   --   --   --  40.7*  --  38.0*  --  34.4*   < > 27.0*   < > 17.8   CR  --   --   --   --  1.02*  --  1.02*  --  1.02*   < > 1.15*   < > 3.33*   ANIONGAP  --   --   --   --  9  --  9  --  6*   < > 8   < > 10   CHELSEY  --   --   --   --  8.0*  --  8.0*  --  8.1*   < > 8.0*   < > 8.5*   GLC 97 129* 174*   < > 279*   < > 255*   < > 207*   < > 172*  167*   < > 100*   ALBUMIN  --   --   --   --  2.4*  --  2.5*  --  2.4*   < > 2.4*   < > 3.6   PROTTOTAL  --   --   --   --  4.6*  --   --   --  4.7*  --  4.7*   < > 6.3*   BILITOTAL  --   --   --   --  0.7  --   --   --  0.7  --  0.8   < > 0.6   ALKPHOS  --   --   --   --  114  --   --   --  79  --  78   < > 235*   ALT  --   --   --   --  28  --   --   --  18  --  20   < > 39   AST  --   --    --   --  16  --   --   --  12  --  17   < > 39   LIPASE  --   --   --   --   --   --   --   --   --   --   --   --  40    < > = values in this interval not displayed.                  Goals of Therapy:  I=O to slightly positive ( up to 0.5 L)    INTERVENTIONS:   none    PLAN:  Continue plan of care. Call CRRT resource RN with any questions or concerns.

## 2024-06-25 NOTE — PLAN OF CARE
CRRT STATUS NOTE    DATA:  Time:  0600  Pressures WNL:  YES  Filter Status:  WDL    Problems Reported/Alarms Noted:  none    Supplies Present:  YES    ASSESSMENT:    Patient Net Fluid Balance:  350ml net negative since midnight       Intake/Output Summary (Last 24 hours) at 6/25/2024 0558  Last data filed at 6/25/2024 0500  Gross per 24 hour   Intake 3012.46 ml   Output 2766 ml   Net 246.46 ml       Vital Signs: Temp:  [98.4  F (36.9  C)-100  F (37.8  C)] 99.1  F (37.3  C)  Pulse:  [] 90  Resp:  [20-28] 24  BP: (83)/(46) 83/46  Cuff Mean (mmHg):  [46] 46  MAP:  [45 mmHg-95 mmHg] 76 mmHg  Arterial Line BP: ()/(35-69) 128/57  FiO2 (%):  [45 %-60 %] 45 %  SpO2:  [73 %-100 %] 100 %       Most Recent BMP's:  Recent Labs   Lab Test 06/25/24  0348 06/24/24  1610 06/24/24  0335 06/23/24  1626 06/23/24  0414 06/22/24  1613 02/09/21  0403 02/08/21  1611 02/08/21  0450 02/07/21  2300 02/07/21 1600 02/07/21  1030 02/07/21  0400 02/06/21  2209 02/06/21  1600 02/06/21  1000    137 135 137 139 139   < > 136   < > 139   < > 138   < > 138 137 136   POTASSIUM 4.1 4.3 4.1 4.2 4.2 3.9   < > 4.5   < > 4.5   < > 4.2   < > 4.5 4.9 4.0   CHLORIDE 105 106 106 107 109* 109*   < > 104   < > 107   < > 106   < > 108 106 105   CO2 22 22 23 23 22 21*   < > 29   < > 26   < > 26   < > 25 26 24   BUN 40.7* 38.0* 34.4* 29.7* 27.0* 28.0*   < > 21   < > 24   < > 23   < > 25 27 27   CR 1.02* 1.02* 1.02* 1.04* 1.15* 1.39*   < > 0.96   < > 1.09*   < > 1.11*   < > 1.16* 1.26* 1.35*   ANIONGAP 9 9 6* 7 8 9   < > 3   < > 6   < > 6   < > 5 5 7   CHELSEY 8.0* 8.0* 8.1* 8.1* 8.0* 7.9*   < > 8.9   < > 8.5   < > 8.5   < > 8.3* 8.3* 8.6   CALCIUMIONIZ  --   --   --   --   --   --   --  5.0  --  4.6  --  4.6  --  4.6 4.6 4.8    < > = values in this interval not displayed.     Most Recent CBC's:  Recent Labs   Lab Test 06/25/24  0348 06/24/24  0335 06/23/24  0414 06/22/24  1615   WBC 3.0* 3.6* 5.4 6.0   HGB 7.0* 7.5* 7.8* 8.0*   * 98 100 98    PLT 70* 69* 84* 89*     Most Recent ABG's:  Recent Labs   Lab Test 06/24/24  2126 06/24/24  1653 06/24/24  0335   PH 7.33* 7.30* 7.37   PO2 133* 80 123*   PCO2 45 50* 41   HCO3 24 25 24   VAN -2.3 -2.0 -1.5       Goals of Therapy:  0-100ml/hr net negative     INTERVENTIONS:   Circuit restarted at 0428.   Charting reviewed. Rounding completed. Treatment plan discussed with bedside nurse.     PLAN:  Continue with current plan of care please contact CRRT resource with any questions or concerns via [employer].

## 2024-06-25 NOTE — PROGRESS NOTES
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     Changes today  - discontinue meropenam & daptomycin  - begin ceftazidine & linezolid (PO)  - Continues on phenylephrine and vaso for pressors, avoid norepi   - start MiraLAX, stop fiber packets  - pressure support trial tomorrow     PLAN:    Neuro:  # Pain   - Fentanyl gtt with boluses PRN  - Acetaminophen 325mg q4H    # Sedation  # Toxic metabolic encephalopathy, worsening  Presented with 2-3 days of lethargy, decreased appetite, and fatigue. Previously has improved with BiPAP/intubation. Ongoing lethargy in setting of sepsis and delirium in the setting of high-dose steroid therapy.   - Quetiapine 150 mg at bedtime PRN  -Start 3 mg melatonin at bedtime for agitation and improved sleep  - Precedex gtt, avoid increasing fentanyl for sedation   - Revisit stress dose steroids in a few days if mental status does not improve.  - RAAS goal 0 to -1    Pulmonary:  # Acute on chronic hypoxic hypercapnic respiratory failure  # Severe Pulmonary edema  # Acute respiratory distress syndrome, improving   Presented to the ED on 6/18/2024 with SOB and hypercapnic respiratory failure. Workup significant for Stenotrophomonas pneumonia. Acute on chronic hypoxic hypercapnic respiratory failure likely secondary to pneumonia with exacerbation by pulmonary edema (diffuse bilateral opacities on CXR, uptrending patient weight 50kg to 57kg). On 6/21, oxygenation and lung pressures consistent with ARDS  physiology (6/21 P/F ratio 206, elevated plateau pressures 30-34). On 6/22, progression of ARDS (6/22 P/F ratio 150) with ongoing elevated plateau pressures. Will plan to continue antibiotics and aggressive pulmonary toilet, volume management with CRRT with goal of net negative 500ml.   - Antibiotics, as below  - Volume management with CRRT, as below  - Mechanical ventilation, wean as able   Hold daily SBT trial with ARDS physiology   Lung protective ventilation (tidal volume 320, PEEP 7)  - Pulmonary toilet   Mucomyst BID   Hypertonic saline BID, alternate with mucomyst   Albuterol neb QID  - Appears increased left lung opacity on CXR compared to previous images- will continue to monitor and order CT chest if repeat CXR demonstrates worsening opacity.  - Did not tolerate volera  - Not able to perform SBT today due to instability, will try tomorrow     Vent Mode: CMV/AC  (Continuous Mandatory Ventilation/ Assist Control)  FiO2 (%): 50 %  Resp Rate (Set): 18 breaths/min  Tidal Volume (Set, mL): 320 mL  PEEP (cm H2O): 7 cmH2O  Resp: 24    # Post Obstructive Pneumonia   # Healthcare-associated pneumonia  # Recurrent right middle bronchus stenosis s/p dilation and stent (6/20/2024)  Presented to the ED on 6/18/2024 with SOB and hypercapnic respiratory failure. Found on bronchoscopy (6/19) to have RML obstruction by narrowed bronchus intermedius as well as copious mucopurulent secretions/mucus plugging. Bronchoscopy culture (6/19) is growing Stenotrophonomas and Enterococcus faecalis and positive bronchoscopy galactomannan (6/19 1/87). Previously treated for Stenotrophonomas/aspergillus pneumonia in 11/2023 with subsequent sputum culture (2/2024) negative for both Stenotrophonomas/Aspergillus. Lower suspicion for aspergillus pneumonia (has been on posaconazole chronically), Plan to continue antibiotics, pulmonary toilet. Has history (bronchoscopy 2/15/24) demonstrating narrowing of right mainstem transplant anastomosis  and bronchus intermedius. CT chest (6/18/2024) and bronchoscopy (6/19/2024) demonstrated occlusion of right middle bronchus. With concern for post-obstructive pneumonia, interventional pulmonology was consulted, and completed bronchoscopy (6/20/2024) with tissue debulking, right middle bronchus balloon dilation to 11 mm, stent placement in right main bronchus, and bifurcating cast placement in right upper bronchus.   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 Per transplant, will stay at current does and reassess after rechecking levels tomorrow AM.  - Transplant pulm consult, appreciate recs  - PTA nebs               Fluticasone-viilanterol (breo ellipta) every day - HOLD with respiratory infection   Montelukast every day   - Immunosuppressants per Tx Pulm:                PTA Tacrolimus (dosing per tx pulm), recheck levels on 6/21               PTA Prednisone 5 mg daily - HOLD with stress dose steroids   PTA azathioprine - HOLD per Transplant pulmonology with repeat infections    Cardiovascular:  # Septic Shock  On 6/19/2024, developed sepsis (hypotension 80s/50s, tachypnea 24) in the setting of stenotrophomonas pneumonia/enterococcus faecium VRE UTI. Etiology of shock likely distributive; less likely hypovolemic (not pulling volumes with CRRT), medication-associated (weaned off of propofol gtt) or obstructive (low suspicion for PE, echo 6/19 without evidence of cardiac dysfunction). Maximum pressor needs 6/21 evening, with improvement with adjusted antibiotic therapy.   - IV pressors, wean as able    S/p Norepi gtt   Vasopressin gtt   Phenylephrine    - MAP goal >65  - Stress dose steroids   Hydrocortisone 50mg q6H (6/21-present)   Fludrocortisone 0.1mg every day (6/21-present)    # Demand Ischemia, resolved    Presented with elevated troponin (peak 499). Not associated with chest pain or hypoxia. EKG without ST elevations/depressions or T wave inversions. Suspect demand ischemia in the setting of sepsis. Repeat troponin for hypotension on 6/25 in the 40s.   - Monitor symptoms  - Telemetry     # Paroxysmal A-Fib  # Possible Pulmonary Hypertension   History of pulmonary hypertension in the setting of ILD and paroxysmal afib on PTA metoprolol tartrate BID. Not on anticoagulation for afib. Has converted between Afib with RVR and sinus tachycardia. Noted elevated right sided ventricular pressures, although no RHC with PCWPs.   - PTA metoprolol tartrate 25mg BID - HOLD  - Metoprolol 5mg x3 times over 15 minutes  - Considering other options such as digoxin or amio, pending interaction with transplant meds     GI/Nutrition:  # Nutrition  NJT placed (6/19/2024).   - Nutrition consult   Tube feeds 30ml/hr   Fiber 1 packet daily    # Diarrhea, resolved  On 6/21, had 8 bowel movements. Occurred in the setting of scheduled bowel regimen and starting new antibiotics (meropenem, levofloxacin). C diff negative. Will stop scheduled bowel regimen and start fiber.  - Bowel regimen   Miralax daily PRN   Senna daily PRN    # Elevated alk phos, resolved  Admission with elevated alkaline phosphatase (237) with elevated GGT consistent with hepatic etiology. Not associated with transaminitis. Etiology may be medication associated vs cholestatic. Now resolved.  - Monitor LFTs    Renal/Fluids/Electrolytes:  # ESRD on iHD (M,W,F)  History of ESRD 2/2 Tacrolimus toxicity on HD (MWF). With soft blood pressures, placed RIJ (6/20/2024) and started CRRT (6/20/2024).  - Nephrology consult   CRRT: aim for net negative 500ml.   - Renally-dosed medications  - RN electrolyte replacement        Endocrine:  # Hyperglycemia with stress-dose steroids  - Insulin gtt     ID:  # Stenotrophomonas pneumonia  # Enterococcus faecium VRE   # Enterococcus faecalis   # Positive bronchoscopy galactomannan with filamentous fungus   Presented to the ED on 6/18/2024 with SOB and hypercapnic respiratory failure. Found on bronchoscopy (6/19) to have RML obstruction by narrowing bronchus intermedius as well as copious mucopurulent secretions/mucus plugging. Sputum culture (6/18) is growing Stenotrophonomas pneumonia. BAL cultures (6/19) pending. Positive bronchoscopy galactomannan (6/19 1.87) Previously treated for Stenotrophonomas/aspergillus pneumonia in 11/2023 with subsequent sputum culture (2/2024) negative for both Stenotrophonomas/Aspergillus. Can consider repeat Stenotrophomonas infection vs opportunistic infection from colonized microbe. Lower suspicion for aspergillus pneumonia (has been on posaconazole chronically) but this is possible. With worsening respiratory status and increasing pressor needs on 6/21, broadened antibiotics to Meropenem and added levofloxacin as second agent to target Stenotrophomonas (per transplant ID).   - Transplant pulmonology consult, appreciate recs  - Transplant ID consult, appreciate recs  - Antibiotics   S/p vancomycin (6/18-6/20)   S/p zosyn (6/18- 6/21)  PTA posaconazole 300mg every day (treatment dose)   Meropenem (6/21- present) in discussion with transplant ID    Discontinue levofloxacin per ID as Stenotrophomonas is resistant  Minocycline (6/20- present)  Micafungin (6/25 - *)  Ceftazidine (6/25 - *)      # Pyuria, Enterococcus faecium VRE and  ESBL E. Coli   Asymptomatic. With progressive IV pressor needs, obtained broad infectious workup which demonstrated UCx (6/19/2024) with Enterococcus faecium VR and ESBL E. Coli. Likely contributing for overall instability, for which we are treating. Also patient can be asymptomatic given immunosuppression.   - Antibiotics   Daptomycin  (6/21 - 24)  Linezolid (6/25-*)                Meropenem (6/21 - present)    # Hx Aspergillus PNA (11/2023)  # Hx PJP PNA (1/2021)  # Hx CMV viremia, recurrent  # Hx EBV viremia  - Transplant ID consult, appreciate recs  PTA posaconazole (Treatment for hx of aspergillus PNA)  PTA azithromycin 250mg qd (CLAD ppx)  PTA bactrim (PJP ppx)  Valgancyclovir      Hematology:    # Acute on Chronic Normocytic Anemia  # Thrombocytopenia, chronic  History of chronic anemia in the setting of kidney disease (baseline Hgb 10-11). Upon admission, Hgb 9. May be bone marrow suppression in the setting of chronic illness; potential contribution by blood loss with CRRT filter changes (~150-200c). Peripheral smear (6/18/2024) without evidence of schistocytes.  - Monitor CBC    Musculoskeletal:  - PT / OT consult     Skin:  - No acute concerns.     General Cares/Prophylaxis:    DVT Prophylaxis: Heparin CRRT, SCDs  GI Prophylaxis: PPI  Restraints: Mitts   Family Communication:  at bedside  Code Status: Full Code     Lines/tubes/drains:  - PIV x2, midline  - RIJ  - A line  - NGT  - ETT    Disposition:  - Medical ICU     Patient seen and findings/plan discussed with medical ICU staff, Dr. Jean.    Germán L. Vélez Reyes, MD, PhD  Internal Medicine Resident           Clinically Significant Risk Factors              # Hypoalbuminemia: Lowest albumin = 2.2 g/dL at 6/21/2024  4:26 PM, will monitor as appropriate   # Thrombocytopenia: Lowest platelets = 69 in last 2 days, will monitor for bleeding                 #Precipitous drop in Hgb/Hct: Lowest Hgb this hospitalization: 7 g/dL. Will continue to monitor and treat/transfuse as appropriate.      # Moderate Malnutrition: based on nutrition assessment    # Financial/Environmental Concerns: none            ====================================  INTERVAL HISTORY:   Agitated overnight and in atrial fibrillation.  updated at bedside.     OBJECTIVE:   1. VITAL SIGNS:   Temp:  [98.4  F  (36.9  C)-99.1  F (37.3  C)] 99.1  F (37.3  C)  Pulse:  [] 120  Resp:  [23-24] 24  BP: (83)/(46) 83/46  Cuff Mean (mmHg):  [46] 46  MAP:  [45 mmHg-95 mmHg] 61 mmHg  Arterial Line BP: ()/(35-69) 111/44  FiO2 (%):  [40 %-60 %] 50 %  SpO2:  [83 %-100 %] 88 %  Vent Mode: CMV/AC  (Continuous Mandatory Ventilation/ Assist Control)  FiO2 (%): 50 %  Resp Rate (Set): 18 breaths/min  Tidal Volume (Set, mL): 320 mL  PEEP (cm H2O): 7 cmH2O  Resp: 24    2. INTAKE/ OUTPUT:   I/O last 3 completed shifts:  In: 3013.11 [I.V.:1723.11; NG/GT:570]  Out: 2864 [Other:2864]    3. PHYSICAL EXAMINATION:  General: Laying in bed, NAD   HEENT: Atraumatic, moist mucous membranes   Pulm/Resp: Coarse breath sounds throughout with decreased breath sounds at bilateral bases. Good air movement throughout.   CV: RRR, no m/r/g   Abdomen: Soft, non-distended, non-tender  Ext: No edema, pulses 2+ radial, pedal  Incisions/Skin: No rashes or lesions  Neuro: Sedated, arouses to voice, follows commands, moving all extremities    4. LABS:   Arterial Blood Gases   Recent Labs   Lab 06/25/24  1209 06/24/24  2126 06/24/24  1653 06/24/24  0335   PH 7.29* 7.33* 7.30* 7.37   PCO2 51* 45 50* 41   PO2 60* 133* 80 123*   HCO3 24 24 25 24     Complete Blood Count   Recent Labs   Lab 06/25/24  0348 06/24/24  0335 06/23/24  0414 06/22/24  1615   WBC 3.0* 3.6* 5.4 6.0   HGB 7.0* 7.5* 7.8* 8.0*   PLT 70* 69* 84* 89*     Basic Metabolic Panel  Recent Labs   Lab 06/25/24  1337 06/25/24  1147 06/25/24  0948 06/25/24  0840 06/25/24  0350 06/25/24  0348 06/24/24  1614 06/24/24  1610 06/24/24  0825 06/24/24  0335 06/23/24  1628 06/23/24  1626   NA  --   --   --   --   --  136  --  137  --  135  --  137   POTASSIUM  --   --   --   --   --  4.1  --  4.3  --  4.1  --  4.2   CHLORIDE  --   --   --   --   --  105  --  106  --  106  --  107   CO2  --   --   --   --   --  22  --  22  --  23  --  23   BUN  --   --   --   --   --  40.7*  --  38.0*  --  34.4*  --  29.7*   CR   --   --   --   --   --  1.02*  --  1.02*  --  1.02*  --  1.04*   * 247* 242* 225*   < > 279*   < > 255*   < > 207*   < > 202*    < > = values in this interval not displayed.     Liver Function Tests  Recent Labs   Lab 06/25/24  0348 06/24/24  1610 06/24/24  0335 06/23/24  1626 06/23/24  0414 06/22/24  1613 06/22/24  0439 06/18/24  1803 06/18/24  1418   AST 16  --  12  --  17  --  11   < >  --    ALT 28  --  18  --  20  --  17   < > 39   ALKPHOS 114  --  79  --  78  --  89   < > 237*   BILITOTAL 0.7  --  0.7  --  0.8  --  1.0   < > 0.6   ALBUMIN 2.4* 2.5* 2.4* 2.4* 2.4*   < > 2.3*  2.3*   < > 3.6   INR  --   --   --   --   --   --   --   --  0.98    < > = values in this interval not displayed.     Coagulation Profile  Recent Labs   Lab 06/18/24  1418   INR 0.98       5. RADIOLOGY:   No results found for this or any previous visit (from the past 24 hour(s)).

## 2024-06-25 NOTE — PROGRESS NOTES
Care Management Follow Up    Length of Stay (days): 7    Expected Discharge Date:  TBD     Concerns to be Addressed: all concerns addressed in this encounter  discharge plans are unknown at this time and will update pt/family once IDT recommentations are made  Patient plan of care discussed at interdisciplinary rounds: Yes    Anticipated Discharge Disposition:  TBD     Anticipated Discharge Services:    Anticipated Discharge DME:      Patient/family educated on Medicare website which has current facility and service quality ratings:  N/A  Education Provided on the Discharge Plan:  N/A  Patient/Family in Agreement with the Plan:  N/A    Referrals Placed by CM/SW:  N/A  Private pay costs discussed: Not applicable    Additional Information:  Chart reviewed. SW to get pt's dialysis information.     SW met with pt's spouse, Ranjan. SW introduce self. Ranjan reported pt went to [facility] in [place], [place] on M-W-F. Pt's dialysis run is about 4 hours and starts at 9:00 am. Pt normally drives herself to these appointments.     [facility]  [address] [place] [address]  [phone]  _______________________    TOMAS Wong, LSW  4C Covering   [facility]  Phone: [phone]  Fax: [phone]    After hours [facility] [place] and After Hours [facility] [place]  Available from 4:00pm - Midnight

## 2024-06-25 NOTE — PLAN OF CARE
Problem: Adult Inpatient Plan of Care  Goal: Absence of Hospital-Acquired Illness or Injury  Intervention: Identify and Manage Fall Risk  Recent Flowsheet Documentation  Taken 6/25/2024 0400 by Silvana Cardona RN  Safety Promotion/Fall Prevention:   clutter free environment maintained   lighting adjusted  Taken 6/25/2024 0000 by Silvana Cardona RN  Safety Promotion/Fall Prevention:   clutter free environment maintained   lighting adjusted  Taken 6/24/2024 2000 by Silvana Cardona RN  Safety Promotion/Fall Prevention:   clutter free environment maintained   lighting adjusted  Intervention: Prevent Skin Injury  Recent Flowsheet Documentation  Taken 6/25/2024 0400 by Silvana Cardona RN  Body Position:   turned   upper extremity elevated   side-lying   left   heels elevated  Skin Protection:   incontinence pads utilized   transparent dressing maintained   pulse oximeter probe site changed  Device Skin Pressure Protection:   absorbent pad utilized/changed   positioning supports utilized   tubing/devices free from skin contact   mittens applied to hands   adhesive use limited  Taken 6/25/2024 0330 by Silvana Cardona RN  Body Position:   turned   right   heels elevated   upper extremity elevated  Taken 6/25/2024 0130 by Silvana Cardona RN  Body Position:   turned   left   heels elevated   upper extremity elevated  Taken 6/25/2024 0000 by Silvana Cardona RN  Body Position:   turned   upper extremity elevated   side-lying   left   heels elevated  Skin Protection:   incontinence pads utilized   transparent dressing maintained   pulse oximeter probe site changed  Device Skin Pressure Protection:   absorbent pad utilized/changed   positioning supports utilized   tubing/devices free from skin contact   mittens applied to hands   adhesive use limited  Taken 6/24/2024 2205 by Silvana Cardona RN  Body Position:   turned   right   heels elevated   upper extremity elevated  Taken 6/24/2024 2000 by Dulce  Silvana STERN RN  Body Position:   turned   lower extremity elevated   upper extremity elevated   side-lying  Skin Protection:   incontinence pads utilized   transparent dressing maintained   pulse oximeter probe site changed  Device Skin Pressure Protection:   absorbent pad utilized/changed   positioning supports utilized   tubing/devices free from skin contact   mittens applied to hands   adhesive use limited  Intervention: Prevent and Manage VTE (Venous Thromboembolism) Risk  Recent Flowsheet Documentation  Taken 6/25/2024 0400 by Silvana Cardona RN  VTE Prevention/Management: SCDs (sequential compression devices) off  Taken 6/25/2024 0000 by Silvana Cardona RN  VTE Prevention/Management: SCDs (sequential compression devices) off  Taken 6/24/2024 2000 by Silvana Cardona RN  VTE Prevention/Management: SCDs (sequential compression devices) off  Intervention: Prevent Infection  Recent Flowsheet Documentation  Taken 6/25/2024 0400 by Silvana Cardona RN  Infection Prevention:   environmental surveillance performed   equipment surfaces disinfected   hand hygiene promoted   personal protective equipment utilized   rest/sleep promoted   single patient room provided   visitors restricted/screened  Taken 6/25/2024 0000 by Silvana Cardona RN  Infection Prevention:   environmental surveillance performed   equipment surfaces disinfected   hand hygiene promoted   personal protective equipment utilized   rest/sleep promoted   single patient room provided   visitors restricted/screened  Taken 6/24/2024 2000 by Silvana Cardona RN  Infection Prevention:   environmental surveillance performed   equipment surfaces disinfected   hand hygiene promoted   personal protective equipment utilized   rest/sleep promoted   single patient room provided   visitors restricted/screened  Goal: Optimal Comfort and Wellbeing  Intervention: Monitor Pain and Promote Comfort  Recent Flowsheet Documentation  Taken 6/25/2024 0400 by  Silvana Cardona, RN  Pain Management Interventions:   care clustered   pillow support provided   quiet environment facilitated   repositioned   rest  Taken 6/24/2024 2000 by Silvana Cardona RN  Pain Management Interventions:   care clustered   pillow support provided   quiet environment facilitated   repositioned   rest  Intervention: Provide Person-Centered Care  Recent Flowsheet Documentation  Taken 6/25/2024 0400 by Silvana Cardona, RN  Trust Relationship/Rapport:   care explained   reassurance provided   thoughts/feelings acknowledged   questions answered   empathic listening provided   emotional support provided   choices provided  Taken 6/25/2024 0000 by Silvana Cardona RN  Trust Relationship/Rapport:   care explained   reassurance provided   thoughts/feelings acknowledged   questions answered   empathic listening provided   emotional support provided   choices provided  Taken 6/24/2024 2000 by Silvana Cardona RN  Trust Relationship/Rapport:   care explained   reassurance provided   thoughts/feelings acknowledged   questions answered   empathic listening provided   emotional support provided   choices provided     Problem: Lung Transplant  Goal: Optimal Coping with Organ Transplant  Intervention: Optimize Psychosocial Response  Recent Flowsheet Documentation  Taken 6/25/2024 0400 by Silvana Cardona, RN  Supportive Measures:   relaxation techniques promoted   positive reinforcement provided  Family/Support System Care:   caregiver stress acknowledged   involvement promoted   presence promoted   self-care encouraged   support provided  Taken 6/25/2024 0000 by Silvana Carodna RN  Supportive Measures:   relaxation techniques promoted   positive reinforcement provided  Family/Support System Care:   caregiver stress acknowledged   involvement promoted   presence promoted   self-care encouraged   support provided  Taken 6/24/2024 2000 by Silvana Cardona, RN  Supportive Measures:   relaxation  techniques promoted   positive reinforcement provided  Family/Support System Care:   caregiver stress acknowledged   involvement promoted   presence promoted   self-care encouraged   support provided  Goal: Effective Bowel Elimination  Intervention: Enhance Bowel Motility and Elimination  Recent Flowsheet Documentation  Taken 6/25/2024 0400 by Silvana Cardona RN  Bowel Elimination Management:   relaxation techniques promoted   hygiene measures promoted  Taken 6/25/2024 0000 by Silvana Cardona RN  Bowel Elimination Management:   relaxation techniques promoted   hygiene measures promoted  Taken 6/24/2024 2000 by Silvana Cardona RN  Bowel Elimination Management:   relaxation techniques promoted   hygiene measures promoted  Goal: Effective Organ Function  Intervention: Support Donor Organ Function  Recent Flowsheet Documentation  Taken 6/25/2024 0400 by Silvana Cardona RN  Lung Protection Measures:   fluid excess minimized   ventilator waveforms monitored  Taken 6/25/2024 0000 by Silvana Cardona RN  Lung Protection Measures:   fluid excess minimized   ventilator waveforms monitored  Taken 6/24/2024 2000 by Silvana Cardona RN  Lung Protection Measures:   fluid excess minimized   ventilator waveforms monitored  Intervention: Promote Airway Secretion Clearance  Recent Flowsheet Documentation  Taken 6/25/2024 0400 by Silvana Cardona RN  Cough And Deep Breathing: (w/ suction) other (see comments)  Activity Management: activity adjusted per tolerance  Taken 6/25/2024 0000 by Silvana Cardona RN  Cough And Deep Breathing: (w/ suction) other (see comments)  Activity Management: activity adjusted per tolerance  Taken 6/24/2024 2000 by Silvana Cardona RN  Cough And Deep Breathing: (w/ suction) other (see comments)  Activity Management: activity adjusted per tolerance  Intervention: Optimize Oxygenation and Ventilation  Recent Flowsheet Documentation  Taken 6/25/2024 0400 by Silvana Cardona  RN  Airway/Ventilation Management:   airway patency maintained   calming measures promoted   pulmonary hygiene promoted  Head of Bed (HOB) Positioning: HOB at 30 degrees  Taken 6/25/2024 0130 by Silvana Cardona RN  Head of Bed (HOB) Positioning: HOB at 30 degrees  Taken 6/25/2024 0000 by Silvana Cardona RN  Airway/Ventilation Management:   airway patency maintained   calming measures promoted   pulmonary hygiene promoted  Head of Bed (HOB) Positioning: HOB at 30 degrees  Taken 6/24/2024 2000 by Silvana Cardona RN  Airway/Ventilation Management:   airway patency maintained   calming measures promoted   pulmonary hygiene promoted  Head of Bed (HOB) Positioning: HOB at 30 degrees  Goal: Blood Glucose Level Within Targeted Range  Intervention: Optimize Glycemic Control  Recent Flowsheet Documentation  Taken 6/25/2024 0400 by Silvana Cardona RN  Glycemic Management:   blood glucose monitored   supplemental insulin given  Taken 6/25/2024 0000 by Silvana Cardona RN  Glycemic Management:   blood glucose monitored   supplemental insulin given  Taken 6/24/2024 2000 by Silvana Cardona RN  Glycemic Management:   blood glucose monitored   supplemental insulin given  Goal: Absence of Infection Signs and Symptoms  Intervention: Prevent or Manage Infection  Recent Flowsheet Documentation  Taken 6/25/2024 0400 by Silvana Cardona RN  Infection Prevention:   environmental surveillance performed   equipment surfaces disinfected   hand hygiene promoted   personal protective equipment utilized   rest/sleep promoted   single patient room provided   visitors restricted/screened  Infection Management: aseptic technique maintained  Taken 6/25/2024 0000 by Silvana Cardona RN  Infection Prevention:   environmental surveillance performed   equipment surfaces disinfected   hand hygiene promoted   personal protective equipment utilized   rest/sleep promoted   single patient room provided   visitors  restricted/screened  Infection Management: aseptic technique maintained  Taken 6/24/2024 2000 by Silvana Cardona, RN  Infection Prevention:   environmental surveillance performed   equipment surfaces disinfected   hand hygiene promoted   personal protective equipment utilized   rest/sleep promoted   single patient room provided   visitors restricted/screened  Infection Management: aseptic technique maintained  Goal: Anesthesia/Sedation Recovery  Intervention: Optimize Anesthesia Recovery  Recent Flowsheet Documentation  Taken 6/25/2024 0400 by Silvana Cardona RN  Safety Promotion/Fall Prevention:   clutter free environment maintained   lighting adjusted  Administration (IS): unable to perform  Reorientation Measures:   calendar in view   clock in view   glasses use encouraged   reorientation provided  Taken 6/25/2024 0000 by Silvana Cardona RN  Safety Promotion/Fall Prevention:   clutter free environment maintained   lighting adjusted  Administration (IS): unable to perform  Reorientation Measures:   calendar in view   clock in view   glasses use encouraged   reorientation provided  Taken 6/24/2024 2000 by Silvana Cardona RN  Safety Promotion/Fall Prevention:   clutter free environment maintained   lighting adjusted  Administration (IS): unable to perform  Reorientation Measures:   calendar in view   clock in view   glasses use encouraged   reorientation provided  Goal: Acceptable Pain Control  Intervention: Prevent or Manage Pain  Recent Flowsheet Documentation  Taken 6/25/2024 0400 by Silvana Cardona, RN  Pain Management Interventions:   care clustered   pillow support provided   quiet environment facilitated   repositioned   rest  Taken 6/24/2024 2000 by Silvana Cardona RN  Pain Management Interventions:   care clustered   pillow support provided   quiet environment facilitated   repositioned   rest  Goal: Effective Oxygenation and Ventilation  Intervention: Optimize Oxygenation and  Ventilation  Recent Flowsheet Documentation  Taken 6/25/2024 0400 by Silvana Cardona RN  Airway/Ventilation Management:   airway patency maintained   calming measures promoted   pulmonary hygiene promoted  Head of Bed (HOB) Positioning: HOB at 30 degrees  Taken 6/25/2024 0130 by Silvana Cardona RN  Head of Bed (HOB) Positioning: HOB at 30 degrees  Taken 6/25/2024 0000 by Silvana Cardona RN  Airway/Ventilation Management:   airway patency maintained   calming measures promoted   pulmonary hygiene promoted  Head of Bed (HOB) Positioning: HOB at 30 degrees  Taken 6/24/2024 2000 by Silvana Cardona RN  Airway/Ventilation Management:   airway patency maintained   calming measures promoted   pulmonary hygiene promoted  Head of Bed (HOB) Positioning: HOB at 30 degrees     Problem: Pneumonia  Goal: Resolution of Infection Signs and Symptoms  Intervention: Prevent Infection Progression  Recent Flowsheet Documentation  Taken 6/25/2024 0400 by Silvana Cardona RN  Infection Management: aseptic technique maintained  Isolation Precautions: contact precautions maintained  Taken 6/25/2024 0000 by Silvana Cardona RN  Infection Management: aseptic technique maintained  Isolation Precautions: contact precautions maintained  Taken 6/24/2024 2000 by Silvana Cardona RN  Infection Management: aseptic technique maintained  Isolation Precautions: contact precautions maintained  Goal: Effective Oxygenation and Ventilation  Intervention: Promote Airway Secretion Clearance  Recent Flowsheet Documentation  Taken 6/25/2024 0400 by Silvana Cardona RN  Cough And Deep Breathing: (w/ suction) other (see comments)  Taken 6/25/2024 0000 by Silvana Cardona RN  Cough And Deep Breathing: (w/ suction) other (see comments)  Taken 6/24/2024 2000 by Silvana Cardona RN  Cough And Deep Breathing: (w/ suction) other (see comments)  Intervention: Optimize Oxygenation and Ventilation  Recent Flowsheet Documentation  Taken 6/25/2024  0400 by Silvana Cardona RN  Airway/Ventilation Management:   airway patency maintained   calming measures promoted   pulmonary hygiene promoted  Head of Bed (HOB) Positioning: HOB at 30 degrees  Taken 6/25/2024 0130 by Silvana Cardona RN  Head of Bed (HOB) Positioning: HOB at 30 degrees  Taken 6/25/2024 0000 by Silvana Cardona RN  Airway/Ventilation Management:   airway patency maintained   calming measures promoted   pulmonary hygiene promoted  Head of Bed (HOB) Positioning: HOB at 30 degrees  Taken 6/24/2024 2000 by Silvana Cardona RN  Airway/Ventilation Management:   airway patency maintained   calming measures promoted   pulmonary hygiene promoted  Head of Bed (HOB) Positioning: HOB at 30 degrees     Problem: Gas Exchange Impaired  Goal: Optimal Gas Exchange  Intervention: Optimize Oxygenation and Ventilation  Recent Flowsheet Documentation  Taken 6/25/2024 0400 by Silvana Cardona RN  Airway/Ventilation Management:   airway patency maintained   calming measures promoted   pulmonary hygiene promoted  Head of Bed (HOB) Positioning: HOB at 30 degrees  Taken 6/25/2024 0130 by Silvana Cardona RN  Head of Bed (HOB) Positioning: HOB at 30 degrees  Taken 6/25/2024 0000 by Silvana Cardona RN  Airway/Ventilation Management:   airway patency maintained   calming measures promoted   pulmonary hygiene promoted  Head of Bed (HOB) Positioning: HOB at 30 degrees  Taken 6/24/2024 2000 by Silvana Cardona RN  Airway/Ventilation Management:   airway patency maintained   calming measures promoted   pulmonary hygiene promoted  Head of Bed (HOB) Positioning: HOB at 30 degrees     Problem: Skin Injury Risk Increased  Goal: Skin Health and Integrity  Intervention: Plan: Nurse Driven Intervention: Positioning  Recent Flowsheet Documentation  Taken 6/25/2024 0400 by Silvana Cardona RN  Plan: Positioning Interventions:   REPOSITION Left/Right (No supine) q2h   OFF-LOAD HEELS with pillows  Taken 6/25/2024 0000 by  Silvana Cardona, RN  Plan: Positioning Interventions:   REPOSITION Left/Right (No supine) q2h   OFF-LOAD HEELS with pillows  Taken 6/24/2024 2000 by Silvana Cardona RN  Plan: Positioning Interventions:   REPOSITION Left/Right (No supine) q2h   OFF-LOAD HEELS with pillows  Intervention: Plan: Nurse Driven Intervention: Moisture Management  Recent Flowsheet Documentation  Taken 6/25/2024 0400 by Silvana Cardona, RN  Moisture Interventions:   Incontinence pad   Perineal cleanser   Barrier ointment (CriticAid, Triad paste)  Incontinence Protocol in Use: Active  Skin Appearance: Reddened  Products:   Incontinence cleanser   Skin Paste (barrier cream)  Frequency of Application: (prn) Other (see comments)  Plan: Moisture Management:   incontinence pad   skin sealant (Cavilon Advanced)   barrier ointment (CriticAid, Triad paste)   perineal cleanser (Maggy spray, Shield wipes)  Taken 6/25/2024 0000 by Silvana Cardona RN  Moisture Interventions:   Incontinence pad   Perineal cleanser   Barrier ointment (CriticAid, Triad paste)  Incontinence Protocol in Use: Active  Skin Appearance: Reddened  Products:   Incontinence cleanser   Skin Paste (barrier cream)  Frequency of Application: (prn) Other (see comments)  Plan: Moisture Management:   incontinence pad   skin sealant (Cavilon Advanced)   barrier ointment (CriticAid, Triad paste)   perineal cleanser (Maggy spray, Shield wipes)  Taken 6/24/2024 2000 by Silvana Cardona, RN  Moisture Interventions:   Incontinence pad   Perineal cleanser   Barrier ointment (CriticAid, Triad paste)  Bathing/Skin Care:   incontinence care   back care   bath, chlorhexidine wipes   bath, complete   wipes, CHG   electrode patches/site rotation   linen changed  Incontinence Protocol in Use: Active  Skin Appearance: Reddened  Products:   Incontinence cleanser   Skin Paste (barrier cream)  Frequency of Application: (prn) Other (see comments)  Plan: Moisture Management:   incontinence pad   skin  sealant (Cavilon Advanced)   barrier ointment (CriticAid, Triad paste)   perineal cleanser (Maggy spray, Shield wipes)  Intervention: Plan: Nurse Driven Intervention: Friction and Shear  Recent Flowsheet Documentation  Taken 6/25/2024 0400 by Silvana Cardona RN  Friction/Shear Interventions:   HOB 30 degrees or less   Assistive lifting device (portable/ceiling lift, etc.)   Silicone foam sacral dressing   Pad bony prominence (elbow pads, heel pads, ear protectors)   Lateral transfer device (hovermat, etc.)  Taken 6/25/2024 0000 by Silvana Cardona RN  Friction/Shear Interventions:   HOB 30 degrees or less   Assistive lifting device (portable/ceiling lift, etc.)   Silicone foam sacral dressing   Pad bony prominence (elbow pads, heel pads, ear protectors)   Lateral transfer device (hovermat, etc.)  Taken 6/24/2024 2000 by Silvana Cardona RN  Friction/Shear Interventions:   HOB 30 degrees or less   Assistive lifting device (portable/ceiling lift, etc.)   Silicone foam sacral dressing   Pad bony prominence (elbow pads, heel pads, ear protectors)   Lateral transfer device (hovermat, etc.)  Intervention: Optimize Skin Protection  Recent Flowsheet Documentation  Taken 6/25/2024 0400 by Silvana Cardona RN  Pressure Reduction Techniques:   heels elevated off bed   weight shift assistance provided  Pressure Reduction Devices:   pressure-redistributing mattress utilized   positioning supports utilized  Skin Protection:   incontinence pads utilized   transparent dressing maintained   pulse oximeter probe site changed  Activity Management: activity adjusted per tolerance  Head of Bed (HOB) Positioning: HOB at 30 degrees  Taken 6/25/2024 0130 by Silvana Cardona RN  Head of Bed (HOB) Positioning: HOB at 30 degrees  Taken 6/25/2024 0000 by Silvana Cardona RN  Pressure Reduction Techniques:   heels elevated off bed   weight shift assistance provided  Pressure Reduction Devices:   pressure-redistributing mattress  utilized   positioning supports utilized  Skin Protection:   incontinence pads utilized   transparent dressing maintained   pulse oximeter probe site changed  Activity Management: activity adjusted per tolerance  Head of Bed (HOB) Positioning: HOB at 30 degrees  Taken 6/24/2024 2000 by Silvana Cardona RN  Pressure Reduction Techniques:   heels elevated off bed   weight shift assistance provided  Pressure Reduction Devices:   pressure-redistributing mattress utilized   positioning supports utilized  Skin Protection:   incontinence pads utilized   transparent dressing maintained   pulse oximeter probe site changed  Activity Management: activity adjusted per tolerance  Head of Bed (HOB) Positioning: HOB at 30 degrees     Problem: Restraint, Nonviolent  Goal: Absence of Harm or Injury  Intervention: Implement Least Restrictive Safety Strategies  Recent Flowsheet Documentation  Taken 6/25/2024 0400 by Silvana Cardona RN  Medical Device Protection: tubing secured  Taken 6/25/2024 0000 by Silvana Cardona RN  Medical Device Protection: tubing secured  Taken 6/24/2024 2000 by Silvana Cardona RN  Medical Device Protection: tubing secured  Intervention: Protect Dignity, Rights and Personal Wellbeing  Recent Flowsheet Documentation  Taken 6/25/2024 0400 by Silvana Cardona RN  Trust Relationship/Rapport:   care explained   reassurance provided   thoughts/feelings acknowledged   questions answered   empathic listening provided   emotional support provided   choices provided  Taken 6/25/2024 0000 by Silvana Cardona RN  Trust Relationship/Rapport:   care explained   reassurance provided   thoughts/feelings acknowledged   questions answered   empathic listening provided   emotional support provided   choices provided  Taken 6/24/2024 2000 by Silvana Cardona RN  Trust Relationship/Rapport:   care explained   reassurance provided   thoughts/feelings acknowledged   questions answered   empathic listening provided    emotional support provided   choices provided  Intervention: Protect Skin and Joint Integrity  Recent Flowsheet Documentation  Taken 6/25/2024 0400 by Silvana Cardona RN  Body Position:   turned   upper extremity elevated   side-lying   left   heels elevated  Skin Protection:   incontinence pads utilized   transparent dressing maintained   pulse oximeter probe site changed  Range of Motion: ROM (range of motion) performed  Taken 6/25/2024 0330 by Silvana Cardona RN  Body Position:   turned   right   heels elevated   upper extremity elevated  Taken 6/25/2024 0130 by Silvana Cardona RN  Body Position:   turned   left   heels elevated   upper extremity elevated  Taken 6/25/2024 0000 by Silvana Cardona RN  Body Position:   turned   upper extremity elevated   side-lying   left   heels elevated  Skin Protection:   incontinence pads utilized   transparent dressing maintained   pulse oximeter probe site changed  Range of Motion: ROM (range of motion) performed  Taken 6/24/2024 2205 by Silvana Cardona RN  Body Position:   turned   right   heels elevated   upper extremity elevated  Taken 6/24/2024 2000 by Silvana Cardona RN  Body Position:   turned   lower extremity elevated   upper extremity elevated   side-lying  Skin Protection:   incontinence pads utilized   transparent dressing maintained   pulse oximeter probe site changed  Range of Motion: ROM (range of motion) performed     Problem: Risk for Delirium  Goal: Optimal Coping  Intervention: Optimize Psychosocial Adjustment to Delirium  Recent Flowsheet Documentation  Taken 6/25/2024 0400 by Silvana Cardona RN  Supportive Measures:   relaxation techniques promoted   positive reinforcement provided  Family/Support System Care:   caregiver stress acknowledged   involvement promoted   presence promoted   self-care encouraged   support provided  Taken 6/25/2024 0000 by Silvana Cardona RN  Supportive Measures:   relaxation techniques promoted   positive  reinforcement provided  Family/Support System Care:   caregiver stress acknowledged   involvement promoted   presence promoted   self-care encouraged   support provided  Taken 6/24/2024 2000 by Silvana Cardona RN  Supportive Measures:   relaxation techniques promoted   positive reinforcement provided  Family/Support System Care:   caregiver stress acknowledged   involvement promoted   presence promoted   self-care encouraged   support provided  Goal: Improved Behavioral Control  Intervention: Prevent and Manage Agitation  Recent Flowsheet Documentation  Taken 6/25/2024 0400 by Silvana Cardona RN  Environment Familiarity/Consistency:   daily routine followed   familiar objects from home provided   personal clothing/items utilized  Taken 6/25/2024 0000 by Silvana Cardona RN  Environment Familiarity/Consistency:   daily routine followed   familiar objects from home provided   personal clothing/items utilized  Taken 6/24/2024 2000 by Silvana Cardona RN  Environment Familiarity/Consistency:   daily routine followed   familiar objects from home provided   personal clothing/items utilized  Intervention: Minimize Safety Risk  Recent Flowsheet Documentation  Taken 6/25/2024 0400 by Silvana Cardona RN  Communication Enhancement Strategies:   one-step directions provided   repetition utilized   extra time allowed for response   nonverbal strategies used  Enhanced Safety Measures:   pain management   review medications for side effects with activity  Trust Relationship/Rapport:   care explained   reassurance provided   thoughts/feelings acknowledged   questions answered   empathic listening provided   emotional support provided   choices provided  Taken 6/25/2024 0000 by Silvana Cardona RN  Communication Enhancement Strategies:   one-step directions provided   repetition utilized   extra time allowed for response   nonverbal strategies used  Enhanced Safety Measures:   pain management   review medications for  side effects with activity  Trust Relationship/Rapport:   care explained   reassurance provided   thoughts/feelings acknowledged   questions answered   empathic listening provided   emotional support provided   choices provided  Taken 6/24/2024 2000 by Silvana Cardona, RN  Communication Enhancement Strategies:   one-step directions provided   repetition utilized   extra time allowed for response   nonverbal strategies used  Enhanced Safety Measures:   pain management   review medications for side effects with activity  Trust Relationship/Rapport:   care explained   reassurance provided   thoughts/feelings acknowledged   questions answered   empathic listening provided   emotional support provided   choices provided  Goal: Improved Attention and Thought Clarity  Intervention: Maximize Cognitive Function  Recent Flowsheet Documentation  Taken 6/25/2024 0400 by Silvana Cardona, RN  Sensory Stimulation Regulation:   care clustered   lighting decreased   quiet environment promoted  Reorientation Measures:   calendar in view   clock in view   glasses use encouraged   reorientation provided  Taken 6/25/2024 0000 by Silvana Cardona, RN  Sensory Stimulation Regulation:   care clustered   lighting decreased   quiet environment promoted  Reorientation Measures:   calendar in view   clock in view   glasses use encouraged   reorientation provided  Taken 6/24/2024 2000 by Silvana Cardona RN  Sensory Stimulation Regulation:   care clustered   lighting decreased   quiet environment promoted  Reorientation Measures:   calendar in view   clock in view   glasses use encouraged   reorientation provided   Goal Outcome Evaluation:       ICU End of Shift Summary. See flowsheets for vital signs and detailed assessment.    Changes this shift: Pt remains lightly sedated on precedex and fentanyl gtts, awake and intermittently restless for much of the shift.  Pt does GALLOWAY and follow simple commands and was able to sleep in between  cares for a few hours tonight.      Pt was turned at 0130 and went into afib with RVR with rates 120-160's for approximately one hour until metoprolol was given IV x 1 (then rates down to 80's to 120's). EKG was done to confirm RVR.  Pt was awake and restless while in RVR, and was able to be weaned off of levophed gtt, see flowsheets.  BP goes up and SaO2 drops when restless.  SaO2 difficult to get accurate reading 2/2 raynauds.  Sensor was replaced with improved sats, able to wean to 45% O2.      Pt on stress dose steriods and has been hyperglycemic into the 200's for days despite high sliding scale, could consider small dose of lantus.    CRRT set alarmed high return pressures and RN was going to flush catheter and found large clot in return access line, so set exchanged.  Return access did aspirate blood and flush well after clot pulled from it.  Pt did not get blood volume returned 2/2 clot. Hgb this am is 7.  Writer attempting to get close to I/O but difficult given addition of several antibiotics, HR instability and set exchange overnight.     Plan: Continue to monitor and update MD with changes and concerns.

## 2024-06-25 NOTE — PROGRESS NOTES
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  The patient was admitted on 24 for progressive hypoxia with dyspnea and worsening hypercapnia in ED requiring intubation likely d/t post-obstructive PNA 2/2 right bronchus stenosis.  Bronch confirming severe stenosis of right anastomosis with extensive RLL plugging.  IP bronch () with laser tissue debulking RMB stenosis, airway balloon dilation of RMB and BI, and placement of stent RMB to BI and bifurcating stent in RUL.  S/p volume resuscitation and transition to CRRT  for hypotension.  Increased agitation/delirium on  with increasing desaturation led to need for increased sedation.  Remains intubated with pulmonary edema on imaging and septic shock requiring pressors.        Today's recommendations:  - Follow pending cultures, ABX per transplant ID  - Repeat Prospera pending  - Tacro level daily to trend, subtherapeutic, dose increased slightly, repeat level  to trend with management per our team (additional evening levels per MICU, not indicated)  - Prednisone chronic dose on hold for stress-dose steroids per MICU  - Posaconazole chronically, addition of micafungin 150 mg daily per transplant ID  - CMV weekly monitoring (pending today) and VGCV ppx given  stress dose steroids     Septic shock:  Acute on chronic hypoxic/hypercapneic respiratory failure:  Post-obstructive multi-lobular PNA:  Right bronchial stenosis with RML collapse: Admitted with 2 weeks of progressive hypoxia, dyspnea, congested cough, and fatigue.  Chronic hypoxia with 2L NC, increased from 3 to 4L over this time with acute decompensation on day of admission associated with hypercapnia.  VBG 7/17/85 in ED s/p intubation.  IP bronch 2/15 s/p tissue debulking without stent placement.  Negative ID workup: respiratory panel, COVID, MRSA nares, PJP, Legionella, Strep pneumoniae, cocci, Histo, CrAg, fungitell, and A. galactomannan (blood).  IgG WNL.  Procal mildly elevated at 0.24 initially (then elevated to 1.77 6/20), LA normal, but febrile to 100.7.  TTE on admission grossly normal.  CT with RUL/RLL consolidative opacities, RML collapse, and narrowing of BI and distal RLL bronchus.  Started on norepi 6/19.  Bronch per MICU (6/19) with severe stenosis starting at right anastomosis, inspection with smaller scope revealing for extensive RLL mucous plugging.  Bronch per IP (6/20) with laser tissue debulking RMB stenosis, airway balloon dilation of RMB and BI, and placement of stent RMB to BI and bifurcating stent in RUL.  Low grade fevers persisting, remains on full vent support.    - Bronch culture (6/19) with filamentous fungus and yeast, following for ID  - BAL (6/21) with Steno, VRE, and E. faecalis  - Sputum culture (6/24) pending  - Blood cultures (6/21, 6/24) NGTD  - ABX: ceftaz (6/25), linezolid (6/25), and minocycline (6/20, Steno) per transplant ID; s/p meropenem (6/21-6/24), daptomycin (6/21-6/24), levofloxacin (6/21-6/23), Zosyn (6/18-6/21), azithromycin 500 mg daily (6/18-6/20) and vancomycin (6/18)   - Nebs: levalbuterol QID, Mucomyst 10% BID alternating with 3% HTS (6/22)  - Vent management per MICU  - Stress dose steroids per MICU (started 6/21)  - Repeat bronch in 6 weeks per IP, sooner  if indicated     S/p bilateral sequential lung transplant (BSLT) for ILD:   Progressive CLAD: Most recent OP visit in February.  DSA negative 6/19 (last positive noted 2018).  Repeat ImmuKnow (6/19) 87, very low but IST adjustment deferred per Dr. Graham given acuity and steroids (after ImmuKnow level).  - Repeat Prospera (6/19) pending, may be artificially elevated with current infection, but notably high at 2.42 on 2/14  - Repeat ImmuKnow is 2-4 weeks as OP  - PTA Singulair, azithromycin, and Breo inhaler (resume once extubated)     Immunosuppression:  On 2-drug IST since November d/t leukopenia and recurrent infections  - Tacrolimus 0.4 mg BID (resumed 6/23, prior levels elevated).  Goal level 8-10.  Level today to trend at 6.2, dose increased to 0.5 mg BID.  Repeat level to trend 6/27 (ordered) with management per our team (additional evening levels per MICU x2 days, not indicated).  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  - ABX and infectious work up as above     Aspergillus ochraceus:  H/o Aspergillus empyema:  S/p empyema drainage and ampho B instillation.  Previously on voriconazole, transitioned to posaconazole and managed by transplant ID as OP with last visit 3/13.  Calcified fungal elements remain with additional recurrent effusion (likely associated with fluid disturbances r/t iHD), but surgical intervention previously deferred d/t risk.  Posaconazole level 2.5 on 6/19.  - Posaconazole 300 mg daily chronically, addition of micafungin 150 mg daily (6/25) per transplant ID     H/o CMV viremia: Recurrent CMV viremia historically.  VGCV ppx most recently stopped 4/12.  Most recent CMV low positive at 46 (6/12) and <35 (6/18).  - CMV weekly  "monitoring (pending today) with steroid increase  - VGCV ppx (renally dosed) given stress dose steroids (6/24)     EBV viremia: S/p rituximab 12/13/23 x1 dose.  Most recent EBV negative 6/18.     ESRD on iHD: Kidney evaluation started as OP.  On qMWF iHD schedule, no missed sessions.  Transitioned to CRRT on 6/20, management per renal and MICU with goal for fluid removal as able.      VRE urine culture: Noted 6/19, >100k GNB and VRE faecium, ABX as above with management per MICU team and transplant ID.     We appreciate the excellent care provided by the MICU team.  Recommendations communicated via in person rounding and this note.  Will continue to follow along closely, please do not hesitate to call with any questions or concerns.     Patient discussed with Dr. Graham.    Hina Huff, DNP, APRN, CNP  Inpatient Nurse Practitioner  Pulmonary CF/Transplant     Subjective & Interval History:     Remains on full vent support, minimal secretions.  Norepi intermittently on, currently only on vaso.  Recurrence of afib with RVR to 160's overnight, some improvement with IV metoprolol but remains in afib RVR to 120's this morning (prior SVT).  Intermittently restless, Precedex continues.  CRRT continues, working toward net negative 1L but did not achieve yesterday.  BG remains elevated.    Review of Systems:     ROS as above, otherwise significantly limited due to intubation and sedation.    Physical Exam:     All notes, images, and labs from past 24 hours (at minimum) were reviewed.    Vital signs:  Temp: 99.1  F (37.3  C) Temp src: Axillary BP: (!) 83/46 (does not correlate to art line) Pulse: 112   Resp: 24 SpO2: 93 % O2 Device: Mechanical Ventilator Oxygen Delivery: 6 LPM Height: 160 cm (5' 3\") Weight: 57.4 kg (126 lb 8.7 oz)  I/O:   Intake/Output Summary (Last 24 hours) at 6/25/2024 0659  Last data filed at 6/25/2024 0600  Gross per 24 hour   Intake 3013.11 ml   Output 2864 ml   Net 149.11 ml     Constitutional: Lying " supine in bed, spouse at bedside, in no apparent distress.   HEENT: Eyes with pink conjunctivae, anicteric.  Orally intubated.  PULM: Mildly diminished air flow bilaterally.  Crackles t/o right, no rhonchi, no wheezes.  Non-labored breathing on CMV 18/320/7/50, PIP 30.  CV: Normal S1 and S2.  Afib RVR to 120's.  No murmur, gallop, or rub.  1+ BLE edema.   ABD: NABS, soft, nontender, nondistended.    MSK: Moves all extremities.    NEURO: Sedated, not conversant.   SKIN: Warm, dry, fragile.   PSYCH: Calm.     Data:     LABS    CMP:   Recent Labs   Lab 06/25/24  0350 06/25/24  0348 06/24/24  2331 06/24/24  1942 06/24/24  1614 06/24/24  1610 06/24/24  0825 06/24/24  0335 06/23/24  1628 06/23/24  1626 06/23/24  0745 06/23/24  0414 06/22/24  0441 06/22/24  0439   NA  --  136  --   --   --  137  --  135  --  137  --  139   < > 137  137   POTASSIUM  --  4.1  --   --   --  4.3  --  4.1  --  4.2  --  4.2   < > 3.9  3.9   CHLORIDE  --  105  --   --   --  106  --  106  --  107  --  109*   < > 106  106   CO2  --  22  --   --   --  22  --  23  --  23  --  22   < > 21*  21*   ANIONGAP  --  9  --   --   --  9  --  6*  --  7  --  8   < > 10  10   * 279* 239* 220*   < > 255*   < > 207*   < > 202*   < > 172*  167*   < > 180*  180*   BUN  --  40.7*  --   --   --  38.0*  --  34.4*  --  29.7*  --  27.0*   < > 29.0*  29.0*   CR  --  1.02*  --   --   --  1.02*  --  1.02*  --  1.04*  --  1.15*   < > 1.67*  1.67*   GFRESTIMATED  --  62  --   --   --  62  --  62  --  61  --  54*   < > 34*  34*   CHELSEY  --  8.0*  --   --   --  8.0*  --  8.1*  --  8.1*  --  8.0*   < > 8.0*  8.0*   MAG  --  2.4*  --   --   --  2.4*  --  2.4*  --  2.3  --  2.3   < > 2.3   PHOS  --  3.9  --   --   --  4.1  --  3.3  --  3.4  --  3.5   < > 2.5   PROTTOTAL  --  4.6*  --   --   --   --   --  4.7*  --   --   --  4.7*  --  4.6*   ALBUMIN  --  2.4*  --   --   --  2.5*  --  2.4*  --  2.4*  --  2.4*   < > 2.3*  2.3*   BILITOTAL  --  0.7  --   --   --   --   " --  0.7  --   --   --  0.8  --  1.0   ALKPHOS  --  114  --   --   --   --   --  79  --   --   --  78  --  89   AST  --  16  --   --   --   --   --  12  --   --   --  17  --  11   ALT  --  28  --   --   --   --   --  18  --   --   --  20  --  17    < > = values in this interval not displayed.     CBC:   Recent Labs   Lab 06/25/24  0348 06/24/24  0335 06/23/24  0414 06/22/24  1615   WBC 3.0* 3.6* 5.4 6.0   RBC 2.27* 2.41* 2.49* 2.58*   HGB 7.0* 7.5* 7.8* 8.0*   HCT 23.0* 23.7* 24.9* 25.3*   * 98 100 98   MCH 30.8 31.1 31.3 31.0   MCHC 30.4* 31.6 31.3* 31.6   RDW 14.9 14.8 15.1* 15.0   PLT 70* 69* 84* 89*       INR:   Recent Labs   Lab 06/18/24  1418   INR 0.98       Glucose:   Recent Labs   Lab 06/25/24  0350 06/25/24  0348 06/24/24  2331 06/24/24  1942 06/24/24  1614 06/24/24  1610   * 279* 239* 220* 244* 255*       Blood Gas:   Recent Labs   Lab 06/24/24  2126 06/24/24  1653 06/24/24  0335 06/23/24  1639 06/23/24  1143 06/23/24  0906 06/21/24  0817 06/19/24  2359   PHV  --   --   --   --  7.28* 7.28*  --  7.40   PCO2V  --   --   --   --  55* 53*  --  43   PO2V  --   --   --   --  35 35  --  46   HCO3V  --   --   --   --  25 25  --  26   LASHAUN  --   --   --   --  -1.6 -2.1  --  1.3   O2PER 60 60 50   < > 60 50  50   < > 30    < > = values in this interval not displayed.       Culture Data No results for input(s): \"CULT\" in the last 168 hours.    Virology Data:   Lab Results   Component Value Date    FLUAH1 Not Detected 06/18/2024    FLUAH3 Not Detected 06/18/2024    BS5099 Not Detected 06/18/2024    IFLUB Not Detected 06/18/2024    RSVA Not Detected 06/18/2024    RSVB Not Detected 06/18/2024    PIV1 Not Detected 06/18/2024    PIV2 Not Detected 06/18/2024    PIV3 Not Detected 06/18/2024    HMPV Not Detected 06/18/2024    HRVS Negative 01/24/2021    ADVBE Negative 01/24/2021    ADVC Negative 01/24/2021    ADVC Negative 12/23/2020    ADVC Negative 10/07/2019       Historical CMV results (last 3 of prior " testing):  Lab Results   Component Value Date    CMVQNT <35 (A) 06/18/2024    CMVQNT Not Detected 03/05/2024    CMVQNT Not Detected 12/19/2023     Lab Results   Component Value Date    CMVLOG <1.5 06/18/2024    CMVLOG 2.5 02/14/2024    CMVLOG <1.5 10/31/2023       Urine Studies    Recent Labs   Lab Test 06/19/24  1214 01/24/21  1729   URINEPH 6.5 5.0   NITRITE Negative Negative   LEUKEST Large* Moderate*   WBCU >182* 34*       Most Recent [facility]ze Pulmonary Function Testing (FVC/FEV1 only)  FVC-Pre   Date Value Ref Range Status   02/14/2024 0.85 L    12/19/2023 1.04 L    11/21/2023 0.85 L    10/24/2023 0.96 L      FVC-%Pred-Pre   Date Value Ref Range Status   02/14/2024 29 %    12/19/2023 36 %    11/21/2023 29 %    10/24/2023 33 %      FEV1-Pre   Date Value Ref Range Status   02/14/2024 0.80 L    12/19/2023 0.89 L    11/21/2023 0.78 L    10/24/2023 0.87 L      FEV1-%Pred-Pre   Date Value Ref Range Status   02/14/2024 34 %    12/19/2023 38 %    11/21/2023 33 %    10/24/2023 37 %        IMAGING    Recent Results (from the past 48 hour(s))   XR Chest Port 1 View    Narrative    Exam: TEMPORARY, 6/24/2024 6:25 AM    Indication: Sepsis    Comparison: 6/23/2074    Findings:   Single frontal view of the chest. Endotracheal tube terminates in the  mid thoracic trachea. Right IJ central venous catheter tip is grossly  stable. Enteric tube courses inferiorly below the diaphragm with tip  out of the field of view. Right bronchial stent. Trachea is midline.  Cardiac silhouette is grossly stable. Improved aeration in the left  lung with persistent opacities throughout the left lung and right lung  base. Probable small bilateral pleural effusions.      Impression    Impression:   1. Overall improved aeration in the left lung with persistent  opacities in the left lung and right lung base, likely improving  pulmonary edema or infection.  2. Stable support devices.    I have personally reviewed the examination and initial  interpretation  and I agree with the findings.    CARMELINA VILLALOBOS MD         SYSTEM ID:  [id number]

## 2024-06-26 NOTE — PROGRESS NOTES
"   06/26/24 1600   Appointment Info   Signing Clinician's Name / Credentials (PT) Daron Loaiza DPT   Living Environment   People in Home spouse   Current Living Arrangements house   Home Accessibility stairs within home  (stair lift)   Transportation Anticipated family or friend will provide   Living Environment Comments Per SW note, pt's  Ranjan stated that \"pt lives with him and their three daughters live close by to provide added support. Ranjan put in chair lifts for pt for the two sets of stairs in the house as well as other DME to ensure pt's safety and independence.  Pt is independent with ADLs and IADLs prior to her hospitalization.\"   Self-Care   Usual Activity Tolerance good   Current Activity Tolerance poor   Equipment Currently Used at Home grab bar, toilet;grab bar, tub/shower;shower chair;wheelchair, manual;cane, straight   Fall history within last six months no   Activity/Exercise/Self-Care Comment Patient was IND with all mobility, ADLs/IADLs prior to admission.   General Information   Onset of Illness/Injury or Date of Surgery 06/18/24   Referring Physician Velez Reyes, German, MD   Patient/Family Therapy Goals Statement (PT) unable to state   Pertinent History of Current Problem (include personal factors and/or comorbidities that impact the POC) Kecia Blue is a 63 year old female with PMH ILD s/p bilateral lung transplant (2018) c/b recurrent right bronchial stenosis s/p repeat balloon dilation/stenting, repeat opportunistic pneumonia (PJP 2021, aspergillus/stenotrophomonas 11/2023) and viremias (EBV, CMV), and ESRD 2/2 tacrolimus toxicity on HD who was admitted on 6/18/2024 for acute hypercapnic respiratory failure in setting of tracheal stenosis and pneumonia.   Existing Precautions/Restrictions cardiac;immunosuppressed;oxygen therapy device and L/min  (Afib, orally intubated, VC-AC FiO2 50% PEEP 7)   Cognition   Affect/Mental Status (Cognition) low arousal/lethargic;confused "   Orientation Status (Cognition) oriented to;person;place  (disoriented to time/month and situation)   Follows Commands (Cognition) follows one-step commands   Pain Assessment   Patient Currently in Pain No   Integumentary/Edema   Integumentary/Edema Comments mild BLE edema, purple discoloration of B toes   Range of Motion (ROM)   Range of Motion ROM is WFL   Strength (Manual Muscle Testing)   Strength (Manual Muscle Testing) Deficits observed during functional mobility   Strength Comments generalized weakness and deconditioning   Bed Mobility   Comment, (Bed Mobility) supine rolling mod A   Transfers   Comment, (Transfers) dependent   Gait/Stairs (Locomotion)   Comment, (Gait/Stairs) deferred due to medical status and weakness   Balance   Balance Comments impaired balance, assist for supported sitting in chair   Sensory Examination   Sensory Perception Comments patient nods head yes to numbness/tingling in hands/feet but unable to elaborate   Clinical Impression   Criteria for Skilled Therapeutic Intervention Yes, treatment indicated   PT Diagnosis (PT) impaired functional mobility   Influenced by the following impairments strength, balance, activity tolerance, cognition   Functional limitations due to impairments bed mobility, transfers, gait, functional endurance   Clinical Presentation (PT Evaluation Complexity) evolving   Clinical Presentation Rationale PMH/comorbidities, clinical judgement   Clinical Decision Making (Complexity) moderate complexity   Planned Therapy Interventions (PT) balance training;bed mobility training;gait training;patient/family education;postural re-education;ROM (range of motion);stair training;strengthening;transfer training;home program guidelines;risk factor education;progressive activity/exercise   Risk & Benefits of therapy have been explained evaluation/treatment results reviewed;care plan/treatment goals reviewed;risks/benefits reviewed;participants voiced agreement with care  plan;participants included;patient   PT Discharge Planning   PT Discharge Recommendation (DC Rec) Transitional Care Facility   PT Rationale for DC Rec Patient below IND baseline, currently dependent for transfers with lift. Will benefit from continued rehab to maximize functional status.

## 2024-06-26 NOTE — PROGRESS NOTES
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     Changes today  - Repeat CXR  - Decrease hydrocortisone to 50 mg BID  - SBT trial daily; try again later this PM  - Discuss with transplant ID re: micafungin   - Decreased hydrocortisone to 50 mg BID    PLAN:    Neuro:  # Pain   - Fentanyl gtt with boluses PRN  - Acetaminophen 325mg q4H    # Sedation  # Toxic metabolic encephalopathy, worsening  Presented with 2-3 days of lethargy, decreased appetite, and fatigue. Previously has improved with BiPAP/intubation. Ongoing lethargy in setting of sepsis and delirium in the setting of high-dose steroid therapy.   - Quetiapine 50mg BID and 100 mg at bedtime PRN  -Start 3 mg melatonin at bedtime for agitation and improved sleep  - Precedex gtt, avoid increasing fentanyl for sedation   - Decrease hydrocortisone to 50mg BID  - RAAS goal 0 to -1    Pulmonary:  # Acute on chronic hypoxic hypercapnic respiratory failure  # Severe Pulmonary edema  # Acute respiratory distress syndrome, progressive  Presented to the ED on 6/18/2024 with SOB and hypercapnic respiratory failure. Workup significant for Stenotrophomonas pneumonia. Acute on chronic hypoxic hypercapnic respiratory failure likely secondary to pneumonia with exacerbation by pulmonary edema (diffuse bilateral opacities on CXR, uptrending patient weight 50kg to 57kg). On 6/21, oxygenation and lung pressures consistent with ARDS physiology (6/21 P/F ratio 206, elevated plateau pressures  30-34). On 6/22, progression of ARDS (6/22 P/F ratio 150) with ongoing elevated plateau pressures. Will plan to continue antibiotics and aggressive pulmonary toilet, volume management (net even with CRRT I/O), and lung protective ventilation () to reduce risk of barotrauma while increasing PEEP (8) to maximize pulmonary recruitment.   - Antibiotics, as below  - Volume management with CRRT, as below  - Mechanical ventilation, wean as able, meets extubation readiness   SBT failed this am, repeat SBT later this afternoon   Lung protective ventilation (tidal volume 320, PEEP 7)  - Pulmonary toilet   Mucomyst BID   Hypertonic saline BID, alternate with mucomyst   Albuterol neb QID  - Appears increased left lung opacity on CXR compared to previous images- will continue to monitor and order CT chest if repeat CXR demonstrates worsening opacity.  - Did not tolerate volera    Vent Mode: CMV/AC  (Continuous Mandatory Ventilation/ Assist Control)  FiO2 (%): 50 %  Resp Rate (Set): 18 breaths/min  Tidal Volume (Set, mL): 320 mL  PEEP (cm H2O): 7 cmH2O  Pressure Support (cm H2O): 10 cmH2O  Resp: 18    # Healthcare-associated pneumonia, Stenotrophomonas and Enterococcus faecalis  # Positive bronchoscopy galactomannan  Presented to the ED on 6/18/2024 with SOB and hypercapnic respiratory failure. Found on bronchoscopy (6/19) to have RML obstruction by narrowed bronchus intermedius as well as copious mucopurulent secretions/mucus plugging. Bronchoscopy culture (6/19) is growing Stenotrophonomas and Enterococcus faecalis (sensitivities pending) and positive bronchoscopy galactomannan (6/19 1/87). Previously treated for Stenotrophonomas/aspergillus pneumonia in 11/2023 with subsequent sputum culture (2/2024) negative for both Stenotrophonomas/Aspergillus. Can consider repeat Stenotrophomonas infection vs opportunistic infection from colonized microbe. Lower suspicion for aspergillus pneumonia (has been on posaconazole  chronically) but this is possible. Plan to continue antibiotics, pulmonary toilet. With worsening respiratory status, can consider broadening empiric antibiotics from Zosyn to Meropenem  - Transplant pulmonology consult, appreciate recs  - Transplant ID consult, appreciate recs  - Antibiotics   S/p vancomycin (6/18-6/20)   S/p zosyn (6/18- 6/21)  PTA posaconazole 300mg every day (treatment dose)  Minocycline (6/20- present)    Meropenem (6/21- present)    Discontinue levofloxacin, as her Stenotrophomonas is resistant, per ID       # Recurrent right middle bronchus stenosis s/p dilation and stent (6/20/2024)  Has history (bronchoscopy 2/15/24) demonstrating narrowing of right mainstem transplant anastomosis and bronchus intermedius. CT chest (6/18/2024) and bronchoscopy (6/19/2024) demonstrated occlusion of right middle bronchus. With concern for post-obstructive pneumonia, interventional pulmonology was consulted, and completed bronchoscopy (6/20/2024) with tissue debulking, right middle bronchus balloon dilation to 11 mm, stent placement in right main bronchus, and bifurcating cast placement in right upper bronchus. Plan for saline nebs BID and outpatient bronchoscopy in 6 weeks.   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 Per transplant, will stay at current does and reassess after rechecking levels tomorrow AM.  - Transplant pulm consult, appreciate recs  - PTA nebs               Fluticasone-viilanterol (breo ellipta) every day - HOLD with respiratory infection   Montelukast every day   - Immunosuppressants per Tx Pulm:                PTA Tacrolimus (dosing per tx pulm), recheck levels on 6/21               PTA Prednisone 5 mg daily - HOLD with stress dose  steroids   PTA azathioprine - HOLD per Transplant pulmonology with repeat infections    Cardiovascular:  # Shock  On 6/19/2024, developed sepsis (hypotension 80s/50s, tachypnea 24) in the setting of stenotrophomonas pneumonia/enterococcus faecium VRE UTI. Etiology of shock likely distributive; less likely hypovolemic (not pulling volumes with CRRT), medication-associated (weaned off of propofol gtt) or obstructive (low suspicion for PE, echo 6/19 without evidence of cardiac dysfunction). Maximum pressor needs 6/21 evening, with improvement with adjusted antibiotic therapy.   - IV pressors, wean as able    Norepi gtt   Vasopressin gtt  - MAP goal >65  - Stress dose steroids   Hydrocortisone 50mg q6H (6/21-6/26); changed to 50 mg BID (6/26- present)   Fludrocortisone 0.1mg every day (6/21-present)    # Demand Ischemia, resolved    Presented with elevated troponin (peak 499). Not associated with chest pain or hypoxia. EKG without ST elevations/depressions or T wave inversions. Suspect demand ischemia in the setting of sepsis.   - Monitor symptoms  - Telemetry     # Hx Paroxysmal A-Fib  # Hx Pulmonary Hypertension (45 mmHg)  History of pulmonary hypertension (45 mmHg, echo 10/2023) in the setting of ILD and paroxysmal afib on PTA metoprolol tartrate BID. Not on anticoagulation for afib.   - Resume PTA metoprolol tartrate 25mg BID     GI/Nutrition:  # Nutrition  NJT placed (6/19/2024).   - Nutrition consult   Tube feeds 30ml/hr    # Diarrhea, resolved  On 6/21, had 8 bowel movements. Occurred in the setting of scheduled bowel regimen and starting new antibiotics (meropenem, levofloxacin). C diff negative. Will stop scheduled bowel regimen and start fiber.  - Bowel regimen   Miralax daily    Senna BID    # Elevated alk phos, resolved  Admission with elevated alkaline phosphatase (237) with elevated GGT consistent with hepatic etiology. Not associated with transaminitis. Etiology may be medication associated vs  cholestatic. Now resolved.  - Monitor LFTs    Renal/Fluids/Electrolytes:  # ESRD on iHD (M,W,F)  History of ESRD 2/2 Tacrolimus toxicity on HD (MWF). With soft blood pressures, placed RIJ (6/20/2024) and started CRRT (6/20/2024).  - Nephrology consult   CRRT: aim for net negative 500ml.   - Renally-dosed medications  - RN electrolyte replacement       Endocrine:  # Risk for hyperglycemia with stress-dose steroids  - HSSI     ID:  # Stenotrophomonas pneumonia  # Positive bronchoscopy galactomannan  Presented to the ED on 6/18/2024 with SOB and hypercapnic respiratory failure. Found on bronchoscopy (6/19) to have RML obstruction by narrowing bronchus intermedius as well as copious mucopurulent secretions/mucus plugging. Sputum culture (6/18) is growing Stenotrophonomas pneumonia. BAL cultures (6/19) pending. Positive bronchoscopy galactomannan (6/19 1.87) Previously treated for Stenotrophonomas/aspergillus pneumonia in 11/2023 with subsequent sputum culture (2/2024) negative for both Stenotrophonomas/Aspergillus. Can consider repeat Stenotrophomonas infection vs opportunistic infection from colonized microbe. Lower suspicion for aspergillus pneumonia (has been on posaconazole chronically) but this is possible. With worsening respiratory status and increasing pressor needs on 6/21, broadened antibiotics to Meropenem and added levofloxacin as second agent to target Stenotrophomonas (per transplant ID).   - Transplant pulmonology consult, appreciate recs  - Transplant ID consult, appreciate recs  - Antibiotics   S/p vancomycin (6/18-6/20)   S/p zosyn (6/18- 6/21)  PTA posaconazole 300mg every day (treatment dose)   Meropenem (6/21- present) in discussion with transplant ID    Discontinue levofloxacin per ID as Stenotrophomonas is resistant  Minocycline (6/20- present)  Micafungin (6/25-present)      # Pyuria, Enterococcus faecium VRE and  ESBL E. Coli   Asymptomatic. With progressive IV pressor needs, obtained broad  infectious workup which demonstrated UCx (6/19/2024) with Enterococcus faecium VR and ESBL E. Coli. Likely contributing for overall instability, for which we are treating. Also patient can be asymptomatic given immunosuppression.   - Antibiotics   Daptomycin (6/21 - present)                Meropenem (6/21 - present)    # Hx Aspergillus PNA (11/2023)  # Hx PJP PNA (1/2021)  # Hx CMV viremia, recurrent  # Hx EBV viremia  - Transplant ID consult, appreciate recs  PTA posaconazole (Treatment for hx of aspergillus PNA)  PTA azithromycin 250mg qd (CLAD ppx)  PTA bactrim (PJP ppx)     Hematology:    # Acute on Chronic Normocytic Anemia  # Thrombocytopenia, chronic  History of chronic anemia in the setting of kidney disease (baseline Hgb 10-11). Upon admission, Hgb 9. May be bone marrow suppression in the setting of chronic illness; potential contribution by blood loss with CRRT filter changes (~150-200c). Peripheral smear (6/18/2024) without evidence of schistocytes.  - Monitor CBC    Musculoskeletal:  - PT / OT consult     Skin:  - No acute concerns.     General Cares/Prophylaxis:    DVT Prophylaxis: Heparin SQ  GI Prophylaxis: PPI  Restraints: None  Family Communication:  at bedside  Code Status: Full Code     Lines/tubes/drains:  - PIV x2, midline  - RIJ  - A line  - NGT  - ETT    Disposition:  - Medical ICU     Patient seen and findings/plan discussed with medical ICU staff, Dr. Jean.    Edin Davis MD  Internal Medicine Resident, PGY 1        Clinically Significant Risk Factors              # Hypoalbuminemia: Lowest albumin = 2.2 g/dL at 6/21/2024  4:26 PM, will monitor as appropriate   # Thrombocytopenia: Lowest platelets = 70 in last 2 days, will monitor for bleeding                 #Precipitous drop in Hgb/Hct: Lowest Hgb this hospitalization: 6.9 g/dL. Will continue to monitor and treat/transfuse as appropriate.      # Moderate Malnutrition: based on nutrition assessment    # Financial/Environmental  Concerns: none            ====================================  INTERVAL HISTORY:   Patient with stable agitation. Continues to have episodes of afib with RVR, treated with metropolol 5 mg. Net even to positive 130ml with CRRT overnight. Failed SBT this morning.    OBJECTIVE:   1. VITAL SIGNS:   Temp:  [98.9  F (37.2  C)-100.4  F (38  C)] 98.9  F (37.2  C)  Pulse:  [] 97  Resp:  [18-23] 18  BP: (117)/(50) 117/50  MAP:  [53 mmHg-92 mmHg] 73 mmHg  Arterial Line BP: ()/(38-64) 127/47  FiO2 (%):  [45 %-50 %] 50 %  SpO2:  [90 %-100 %] 100 %  Vent Mode: CMV/AC  (Continuous Mandatory Ventilation/ Assist Control)  FiO2 (%): 50 %  Resp Rate (Set): 18 breaths/min  Tidal Volume (Set, mL): 320 mL  PEEP (cm H2O): 7 cmH2O  Pressure Support (cm H2O): 10 cmH2O  Resp: 18    2. INTAKE/ OUTPUT:   I/O last 3 completed shifts:  In: 3423.88 [I.V.:1558.88; NG/GT:645]  Out: 3031.1 [Other:2331.1; Stool:700]    3. PHYSICAL EXAMINATION:  General: Laying in bed, NAD   HEENT: Atraumatic, moist mucous membranes   Pulm/Resp: Coarse breath sounds throughout with decreased breath sounds at bilateral bases. Good air movement throughout.   CV: RRR, no m/r/g   Abdomen: Soft, non-distended, non-tender  Ext: No edema, pulses 2+ radial, pedal  Incisions/Skin: No rashes or lesions  Neuro: Sedated, arouses to voice, follows commands, moving all extremities    4. LABS:   Arterial Blood Gases   Recent Labs   Lab 06/26/24  0310 06/25/24  1209 06/24/24  2126 06/24/24  1653   PH 7.35 7.29* 7.33* 7.30*   PCO2 44 51* 45 50*   PO2 137* 60* 133* 80   HCO3 24 24 24 25     Complete Blood Count   Recent Labs   Lab 06/26/24  1200 06/26/24  0310 06/25/24  0348 06/24/24  0335 06/23/24  0414   WBC  --  3.0* 3.0* 3.6* 5.4   HGB 8.8* 6.9* 7.0* 7.5* 7.8*   PLT  --  86* 70* 69* 84*     Basic Metabolic Panel  Recent Labs   Lab 06/26/24  1804 06/26/24  1557 06/26/24  1456 06/26/24  0407 06/26/24  0310 06/25/24  1847 06/25/24  1826 06/25/24  0350 06/25/24  0348   NA   --  139  --   --  138  --  138  --  136   POTASSIUM  --  3.6  --   --  4.0  --  3.8  --  4.1   CHLORIDE  --  107  --   --  107  --  105  --  105   CO2  --  23  --   --  23  --  22  --  22   BUN  --  36.6*  --   --  39.1*  --  38.7*  --  40.7*   CR  --  0.90  --   --  0.96*  --  0.97*  --  1.02*   * 122*  126* 111*   < > 193*   < > 196*   < > 279*    < > = values in this interval not displayed.     Liver Function Tests  Recent Labs   Lab 06/26/24  1557 06/26/24  0310 06/25/24  1826 06/25/24  0348 06/24/24  1610 06/24/24  0335 06/23/24  1626 06/23/24  0414   AST  --  22  --  16  --  12  --  17   ALT  --  38  --  28  --  18  --  20   ALKPHOS  --  134  --  114  --  79  --  78   BILITOTAL  --  0.8  --  0.7  --  0.7  --  0.8   ALBUMIN 2.7* 2.6* 2.7* 2.4*   < > 2.4*   < > 2.4*    < > = values in this interval not displayed.     Coagulation Profile  No lab results found in last 7 days.      5. RADIOLOGY:   Recent Results (from the past 24 hour(s))   XR Chest Port 1 View    Narrative    Exam: XR CHEST PORT 1 VIEW, 6/26/2024 10:07 AM    Indication: hypoxia    Comparison: Chest x-ray 6/24/2024    Findings:     Portable frontal projection chest radiograph. Similar postsurgical  changes and support devices including sternotomy wires, right  bronchial stent, endotracheal tube, right IJ catheter, right midline  catheter and partially visualized enteric tube. Similar pulmonary  opacities, left more than right, predominantly mid and lower zone and  cardiac silhouette. No acute airspace opacity. No discernible  pneumothorax. Possible trace effusions. Low lung volumes      Impression    Impression: Stable support devices. Similar pulmonary opacities, left  more than right, compared to prior radiograph from 6/24/2024,  representing edema/infection with atelectasis.    EDIE VILLA MD         SYSTEM ID:  [id number]

## 2024-06-26 NOTE — PROGRESS NOTES
"CRRT STATUS NOTE    DATA:  Time:  1832  Pressures WNL:  YES  Filter Status:  WDL    Problems Reported/Alarms Noted:  None.    Supplies Present:  Yes    ASSESSMENT:  Patient Net Fluid Balance:     Intake/Output Summary (Last 24 hours) at 6/26/2024 1832  Last data filed at 6/26/2024 1800  Gross per 24 hour   Intake 3274.28 ml   Output 3297 ml   Net -22.72 ml        Vital Signs:    /50   Pulse 97   Temp 98.9  F (37.2  C)   Resp 18   Ht 1.6 m (5' 3\")   Wt 56.6 kg (124 lb 12.5 oz)   LMP 06/07/2014 (Exact Date)   SpO2 100%   BMI 22.10 kg/m          Labs:    Lab Results   Component Value Date    WBC 3.0 (L) 06/26/2024    HGB 8.8 (L) 06/26/2024    HCT 23.1 (L) 06/26/2024     (H) 06/26/2024    PLT 86 (L) 06/26/2024     Lab Results   Component Value Date     06/26/2024    POTASSIUM 3.6 06/26/2024    CHLORIDE 107 06/26/2024    CO2 23 06/26/2024     (H) 06/26/2024     (H) 06/26/2024     Lab Results   Component Value Date    BUN 36.6 (H) 06/26/2024    CR 0.90 06/26/2024        Goals of Therapy:  I=O to slightly positive (up to 0.5L)    INTERVENTIONS: Continue fluid removal per goals of care as patient tolerates. Check filter daily. Change filter q72 hours and PRN. Please call CRRT Resource RN on [facility] with any questions or concerns.     "

## 2024-06-26 NOTE — PROGRESS NOTES
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  The patient was admitted on 24 for progressive hypoxia with dyspnea and worsening hypercapnia in ED requiring intubation likely d/t post-obstructive PNA 2/2 right bronchus stenosis.  Bronch confirming severe stenosis of right anastomosis with extensive RLL plugging.  IP bronch () with laser tissue debulking RMB stenosis, airway balloon dilation of RMB and BI, and placement of stent RMB to BI and bifurcating stent in RUL.  S/p volume resuscitation and transition to CRRT  for hypotension.  Increased agitation/delirium on  with increasing desaturation led to need for increased sedation.  Remains intubated with pulmonary edema on imaging and septic shock requiring pressors.  Also with intermittent afib since .       Today's recommendations:  - Fungitell () pending  - Follow pending cultures, ABX per transplant ID  - Tacrolimus level to trend ordered   - Prednisone chronic dose on hold for stress-dose steroids per MICU  - Posaconazole chronically, micafungin 150 mg daily per transplant ID  - CMV weekly monitoring (qT) and VGCV ppx given stress dose steroids     Septic shock:  Acute on chronic hypoxic/hypercapneic respiratory  failure:  Post-obstructive multi-lobular PNA:  Right bronchial stenosis with RML collapse: Admitted with 2 weeks of progressive hypoxia, dyspnea, congested cough, and fatigue.  Chronic hypoxia with 2L NC, increased from 3 to 4L over this time with acute decompensation on day of admission associated with hypercapnia.  VBG 7/17/85 in ED s/p intubation.  IP bronch 2/15 s/p tissue debulking without stent placement.  Negative ID workup: respiratory panel, COVID, MRSA nares, PJP, Legionella, Strep pneumoniae, cocci, Histo, CrAg, fungitell, and A. galactomannan (blood).  IgG WNL.  Procal mildly elevated at 0.24 initially (then elevated to 1.77 6/20), LA normal, but febrile to 100.7.  TTE on admission grossly normal.  CT with RUL/RLL consolidative opacities, RML collapse, and narrowing of BI and distal RLL bronchus.  Started on norepi 6/19.  Bronch per MICU (6/19) with severe stenosis starting at right anastomosis, inspection with smaller scope revealing for extensive RLL mucous plugging.  Bronch per IP (6/20) with laser tissue debulking RMB stenosis, airway balloon dilation of RMB and BI, and placement of stent RMB to BI and bifurcating stent in RUL.  Intermittent/low grade fevers persisting, remains on full vent support, limited PS trial 6/26.    - Fungitell (6/25) pending  - Bronch culture (6/19) with Steno, VSE. faecalis, Candida guilliermondii complex and filamentous fungus, following for ID  - BAL (6/21) with Steno, VRE, and E. faecalis  - Sputum culture (6/24) with Steno, VRE, and Candida guilliermondii complex  - Blood cultures (6/21, 6/24) NGTD  - ABX: ceftazidime (6/25), linezolid (6/25), and minocycline (6/20, Steno) per transplant ID; s/p meropenem (6/21-6/24), daptomycin (6/21-6/24), levofloxacin (6/21-6/23), Zosyn (6/18-6/21), azithromycin 500 mg daily (6/18-6/20), and vancomycin (6/18)   - Nebs: levalbuterol QID, Mucomyst 10% BID alternating with 3% HTS (6/22), will need to be on NS nebs BID upon discharge  per IP  - Vent management per MICU  - Stress dose steroids per MICU (started 6/21)  - Repeat bronch in 6 weeks per IP, sooner if indicated     S/p bilateral sequential lung transplant (BSLT) for ILD:   Progressive CLAD: Most recent OP visit in February.  DSA negative 6/19 (last positive noted 2018).  Repeat ImmuKnow (6/19) 87, very low but IST adjustment deferred per Dr. Graham given acuity and steroids (after ImmuKnow level).  Repeat Prospera remains high at 2.3 on 6/19, may be artificially elevated with current infection, but also notably high at 2.42 on 2/14.  - Repeat Prospera and ImmuKnow in 2-4 weeks as OP  - PTA Singulair, azithromycin, and Breo inhaler (resume once extubated)     Immunosuppression:  On 2-drug IST since November d/t leukopenia and recurrent infections  - Tacrolimus 0.5 mg BID (increased 6/25).  Goal level 8-10.  Repeat level to trend ordered 6/27 with management per our team.  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  - ABX and infectious work up as above     Aspergillus ochraceus:  H/o Aspergillus empyema:  S/p empyema drainage and ampho B instillation.  Previously on voriconazole, transitioned to posaconazole and managed by transplant ID as OP with last visit 3/13.  Calcified fungal elements remain with additional recurrent effusion (likely associated with fluid disturbances r/t iHD), but surgical intervention previously deferred d/t risk.  Posaconazole level 2.5 on 6/19.  - Posaconazole 300 mg daily chronically, micafungin 150 mg daily (6/25) per transplant ID     H/o CMV viremia: Recurrent CMV viremia historically.  VGCV ppx most recently stopped 4/12.  Recent CMV low positive at 46 (6/12), <35 (6/18), and 39 (6/25).  - CMV  "weekly monitoring (qTuesday) with steroid increase  - VGCV ppx (renally dosed) given stress dose steroids (6/24)     EBV viremia: S/p rituximab 12/13/23 x1 dose.  Most recent EBV negative 6/18.     ESRD on iHD: Kidney evaluation started as OP.  On qMWF iHD schedule, no missed sessions.  Transitioned to CRRT on 6/20, management per renal and MICU with eventual goal for fluid removal as able.      VRE urine culture: Noted 6/19, >100k GNB and VRE faecium, ABX as above with management per MICU team and transplant ID.    We appreciate the excellent care provided by the MICU team.  Recommendations communicated via this note.  Will continue to follow along closely, please do not hesitate to call with any questions or concerns.    Patient discussed with Dr. Graham.    Cindy Mcadams PA-C  [facility]  Pulmonary CF/Transplant     Subjective & Interval History:     Intermittently agitated, remains on Precedex.  Brief PS trial 10/7 this morning, tachypnea and in/out of afib.  Otherwise full vent support.  Remains on vasopressin.  CRRT continues with I=O to slightly positive, net positive 155 ml yesterday (+9.1L since admission).  Tmax 100.4.  TF at goal, having BMs.    Review of Systems:     ROS as above otherwise significantly limited due to intubation and sedation.    Physical Exam:     All notes, images, and labs from past 24 hours (at minimum) were reviewed.    Vital signs:  Temp: 98.9  F (37.2  C) Temp src: Axillary BP: 117/50 Pulse: 114   Resp: 18 SpO2: 99 % O2 Device: Mechanical Ventilator Oxygen Delivery: 6 LPM Height: 160 cm (5' 3\") Weight: 56.6 kg (124 lb 12.5 oz)  I/O:   Intake/Output Summary (Last 24 hours) at 6/26/2024 1102  Last data filed at 6/26/2024 1039  Gross per 24 hour   Intake 3364.8 ml   Output 2530.4 ml   Net 834.4 ml     Constitutional: Lying supine in bed, in no apparent distress.   HEENT: Eyes with pink conjunctivae, anicteric.  Orally intubated.  Nasal FT.  PULM: Mildly diminished air flow " bilaterally.  Crackles t/o R>L.  No rhonchi, no wheezes.  Non-labored breathing on full vent support.  CV: Normal S1 and S2.  RRR.  No murmur, gallop, or rub.  1+ BLE edema.  ABD: NABS, soft, nondistended.    MSK: No apparent muscle wasting.   NEURO: Opens eyes to voice, able to answer a couple yes/no questions by nodding/shaking head prior to closing eyes/asleep, not following commands.  SKIN: Warm, dry, fragile.  PSYCH: Calm.     Data:     LABS    CMP:   Recent Labs   Lab 06/26/24  0853 06/26/24  0754 06/26/24  0657 06/26/24  0605 06/26/24  0407 06/26/24  0310 06/25/24  1847 06/25/24  1826 06/25/24  0350 06/25/24  0348 06/24/24  1614 06/24/24  1610 06/24/24  0825 06/24/24  0335 06/23/24  0745 06/23/24  0414   NA  --   --   --   --   --  138  --  138  --  136  --  137  --  135   < > 139   POTASSIUM  --   --   --   --   --  4.0  --  3.8  --  4.1  --  4.3  --  4.1   < > 4.2   CHLORIDE  --   --   --   --   --  107  --  105  --  105  --  106  --  106   < > 109*   CO2  --   --   --   --   --  23  --  22  --  22  --  22  --  23   < > 22   ANIONGAP  --   --   --   --   --  8  --  11  --  9  --  9  --  6*   < > 8   * 155* 141* 99   < > 193*   < > 196*   < > 279*   < > 255*   < > 207*   < > 172*  167*   BUN  --   --   --   --   --  39.1*  --  38.7*  --  40.7*  --  38.0*  --  34.4*   < > 27.0*   CR  --   --   --   --   --  0.96*  --  0.97*  --  1.02*  --  1.02*  --  1.02*   < > 1.15*   GFRESTIMATED  --   --   --   --   --  67  --  66  --  62  --  62  --  62   < > 54*   CHELSEY  --   --   --   --   --  7.6*  --  8.1*  --  8.0*  --  8.0*  --  8.1*   < > 8.0*   MAG  --   --   --   --   --  2.4*  --  2.3  --  2.4*  --  2.4*  --  2.4*   < > 2.3   PHOS  --   --   --   --   --  4.0  --  4.0  --  3.9  --  4.1  --  3.3   < > 3.5   PROTTOTAL  --   --   --   --   --  4.6*  --   --   --  4.6*  --   --   --  4.7*  --  4.7*   ALBUMIN  --   --   --   --   --  2.6*  --  2.7*  --  2.4*  --  2.5*  --  2.4*   < > 2.4*   BILITOTAL  --   --   " --   --   --  0.8  --   --   --  0.7  --   --   --  0.7  --  0.8   ALKPHOS  --   --   --   --   --  134  --   --   --  114  --   --   --  79  --  78   AST  --   --   --   --   --  22  --   --   --  16  --   --   --  12  --  17   ALT  --   --   --   --   --  38  --   --   --  28  --   --   --  18  --  20    < > = values in this interval not displayed.     CBC:   Recent Labs   Lab 06/26/24  0310 06/25/24  0348 06/24/24  0335 06/23/24  0414   WBC 3.0* 3.0* 3.6* 5.4   RBC 2.28* 2.27* 2.41* 2.49*   HGB 6.9* 7.0* 7.5* 7.8*   HCT 23.1* 23.0* 23.7* 24.9*   * 101* 98 100   MCH 30.3 30.8 31.1 31.3   MCHC 29.9* 30.4* 31.6 31.3*   RDW 15.0 14.9 14.8 15.1*   PLT 86* 70* 69* 84*       INR: No lab results found in last 7 days.    Glucose:   Recent Labs   Lab 06/26/24  0853 06/26/24  0754 06/26/24  0657 06/26/24  0605 06/26/24  0517 06/26/24  0407   * 155* 141* 99 126* 118*       Blood Gas:   Recent Labs   Lab 06/26/24  0310 06/25/24  1209 06/24/24  2126 06/23/24  1639 06/23/24  1143 06/23/24  0906 06/21/24  0817 06/19/24  2359   PHV  --   --   --   --  7.28* 7.28*  --  7.40   PCO2V  --   --   --   --  55* 53*  --  43   PO2V  --   --   --   --  35 35  --  46   HCO3V  --   --   --   --  25 25  --  26   LASHAUN  --   --   --   --  -1.6 -2.1  --  1.3   O2PER 50 50 60   < > 60 50  50   < > 30    < > = values in this interval not displayed.       Culture Data No results for input(s): \"CULT\" in the last 168 hours.    Virology Data:   Lab Results   Component Value Date    FLUAH1 Not Detected 06/18/2024    FLUAH3 Not Detected 06/18/2024    [id number] Not Detected 06/18/2024    IFLUB Not Detected 06/18/2024    RSVA Not Detected 06/18/2024    RSVB Not Detected 06/18/2024    PIV1 Not Detected 06/18/2024    PIV2 Not Detected 06/18/2024    PIV3 Not Detected 06/18/2024    HMPV Not Detected 06/18/2024    HRVS Negative 01/24/2021    ADVBE Negative 01/24/2021    ADVC Negative 01/24/2021    ADVC Negative 12/23/2020    ADVC Negative " 10/07/2019       Historical CMV results (last 3 of prior testing):  Lab Results   Component Value Date    CMVQNT <35 (A) 06/18/2024    CMVQNT Not Detected 03/05/2024    CMVQNT Not Detected 12/19/2023     Lab Results   Component Value Date    CMVLOG 1.6 06/25/2024    CMVLOG <1.5 06/18/2024    CMVLOG 2.5 02/14/2024       Urine Studies    Recent Labs   Lab Test 06/19/24  1214 01/24/21  1729   URINEPH 6.5 5.0   NITRITE Negative Negative   LEUKEST Large* Moderate*   WBCU >182* 34*       Most Recent [place] Pulmonary Function Testing (FVC/FEV1 only)  FVC-Pre   Date Value Ref Range Status   02/14/2024 0.85 L    12/19/2023 1.04 L    11/21/2023 0.85 L    10/24/2023 0.96 L      FVC-%Pred-Pre   Date Value Ref Range Status   02/14/2024 29 %    12/19/2023 36 %    11/21/2023 29 %    10/24/2023 33 %      FEV1-Pre   Date Value Ref Range Status   02/14/2024 0.80 L    12/19/2023 0.89 L    11/21/2023 0.78 L    10/24/2023 0.87 L      FEV1-%Pred-Pre   Date Value Ref Range Status   02/14/2024 34 %    12/19/2023 38 %    11/21/2023 33 %    10/24/2023 37 %        IMAGING    No results found for this or any previous visit (from the past 48 hour(s)).

## 2024-06-26 NOTE — PROGRESS NOTES
Nephrology Progress Note  06/26/2024       Kecia Blue is a 63 yof w/ILD with antisynthetase syndrome s/p bilateral lung transplant 3/1/2018 w/ multiple post transplant infectious complications (Aspergillus, EBV, CMV), recurrent bronchial stenosis, ESKD on iHD (since 2019 and 2/2 CNI toxicity) via LUE AVG who presents with hypercapnic resp failure, tried Bipap but eventually was intubated for resp acidosis. Nephrology consulted for management of HD while admitted.      Interval History :   Mrs Blue continues on CRRT, newly off of pressor this am after being slightly positive as planned yesterday. Similar goal today, intake is still high enough that we needed 3L of UF to achieve goals yesterday so will keep CRRT modality for now. HR improved in sinus rhythm this am.      Assessment & Recommendations:   ESRD-ESKD: pt dialyzes MWF at [facility] (ph [phone], f [phone]) with Dr. Glasgow. Run time: 3.5 hrs. EDW 52.5 kg. Access: L AVF. +heparin 1,500u bolus.                  -Appreciate team placing tri-alysis line to run norepi and will plan for CRRT, 4k baths, even to slightly positive today, 500u heparin/h through circuit.                -No need for new dialysis consent, continuing long term HD for ESRD.                -L AVF with remote hx of needing intervention, no issue recently.      Outpt Rx:       Volume-Above EDW by ~5kg but may have some variability with scales, ordered for I=O to slightly positive.      Pulm-Admitted with hypercapnic resp failure, suspected PNA. ID involved, getting bronch.  Currently on Zosyn, bactrim, Posaconazole, Azithromycin and Vanco x1 dose so far.       Electrolytes-Stable on CRRT, 4k baths, checking labs BID.      BMD-Stable on CRRT, has replacements ordered with CRRT order set.      Anemia-Hgb ~7 and drifting, on venofer 50mg weekly but will hold while treating for infection. On Mircera 60mcg [id number], will cover with short term Epo while admitted when  "stable enough for HD.       Nutrition-[facility] renal started      Time spent: 50 minutes on this date of encounter for chart review, physical exam, medical decision making and co-ordination of care.      Seen and discussed with Dr Vazquez     Recommendations were communicated to primary team via verbal communication.     ADRIÁN Lo CNS  Clinical Nurse Specialist  [phone]    Review of Systems:   I reviewed the following systems:  ROS not done due to vent/sedation    Physical Exam:   I/O last 3 completed shifts:  In: 2938.09 [I.V.:1523.09; NG/GT:695]  Out: 2786.5 [Other:2586.5; Stool:200]   /50   Pulse 114   Temp 98.9  F (37.2  C)   Resp 18   Ht 1.6 m (5' 3\")   Wt 56.6 kg (124 lb 12.5 oz)   LMP 06/07/2014 (Exact Date)   SpO2 99%   BMI 22.10 kg/m       GENERAL APPEARANCE: Intubated but alert.    EYES: No scleral icterus  Pulmonary: lungs Rhonchi to auscultation with equal breath sounds bilaterally  CV: Regular rhythm, normal rate   - Edema none  GI: soft, nontender, normal bowel sounds  MS: no evidence of inflammation in joints, no muscle tenderness  : No Basilio  SKIN: no rash, warm, dry  NEURO: No focal deficits    Labs:   All labs reviewed by me  Electrolytes/Renal -   Recent Labs   Lab Test 06/26/24  1058 06/26/24  0853 06/26/24  0754 06/26/24  0407 06/26/24  0310 06/25/24  1847 06/25/24  1826 06/25/24  0350 06/25/24  0348   NA  --   --   --   --  138  --  138  --  136   POTASSIUM  --   --   --   --  4.0  --  3.8  --  4.1   CHLORIDE  --   --   --   --  107  --  105  --  105   CO2  --   --   --   --  23  --  22  --  22   BUN  --   --   --   --  39.1*  --  38.7*  --  40.7*   CR  --   --   --   --  0.96*  --  0.97*  --  1.02*   * 146* 155*   < > 193*   < > 196*   < > 279*   CHELSEY  --   --   --   --  7.6*  --  8.1*  --  8.0*   MAG  --   --   --   --  2.4*  --  2.3  --  2.4*   PHOS  --   --   --   --  4.0  --  4.0  --  3.9    < > = values in this interval not displayed.       CBC - "   Recent Labs   Lab Test 06/26/24  0310 06/25/24  0348 06/24/24  0335   WBC 3.0* 3.0* 3.6*   HGB 6.9* 7.0* 7.5*   PLT 86* 70* 69*       LFTs -   Recent Labs   Lab Test 06/26/24  0310 06/25/24  1826 06/25/24  0348 06/24/24  1610 06/24/24  0335   ALKPHOS 134  --  114  --  79   BILITOTAL 0.8  --  0.7  --  0.7   ALT 38  --  28  --  18   AST 22  --  16  --  12   PROTTOTAL 4.6*  --  4.6*  --  4.7*   ALBUMIN 2.6* 2.7* 2.4*   < > 2.4*    < > = values in this interval not displayed.       Iron Panel -   Recent Labs   Lab Test 02/03/21  0415 12/13/18  1033 08/01/18  0921   IRON 51 16* 93   IRONSAT 36 7* 37   YOLA  --  302* 571*           Current Medications:  Current Facility-Administered Medications   Medication Dose Route Frequency Provider Last Rate Last Admin    acetylcysteine (MUCOMYST) 10 % nebulizer solution 4 mL  4 mL Inhalation BID Velez Reyes, German, MD   4 mL at 06/26/24 0834    azithromycin (ZITHROMAX) tablet 250 mg  250 mg Oral or Feeding Tube Daily Velez Reyes, German, MD   250 mg at 06/26/24 0740    B and C vitamin Complex with folic acid (NEPHRONEX) liquid 5 mL  5 mL Per Feeding Tube Daily Awa Watt MD   5 mL at 06/26/24 0740    calcium carbonate (TUMS) chewable tablet 500 mg  500 mg Oral Once per day on Sunday Tuesday Thursday Saturday Josesito Pratt MD   500 mg at 06/25/24 0910    cefTAZidime (FORTAZ) 2 g vial to attach to  ml bag for ADULTS or NS 50 ml bag for PEDS  2 g Intravenous Q8H Chio Cortez MD   2 g at 06/26/24 0504    chlorhexidine (PERIDEX) 0.12 % solution 15 mL  15 mL Mouth/Throat Q12H Chio Cortez MD   15 mL at 06/26/24 0740    fludrocortisone (FLORINEF) tablet 0.1 mg  0.1 mg Oral or Feeding Tube Daily Evelyn Roberts APRN CNP   0.1 mg at 06/26/24 0740    hydrocortisone sodium succinate PF (solu-CORTEF) injection 50 mg  50 mg Intravenous BID Velez Reyes, German, MD        levalbuterol (XOPENEX) neb solution 0.63 mg  0.63 mg Nebulization 4x Daily Gareth Mosquera  MD Yeyo   0.63 mg at 06/26/24 0834    linezolid (ZYVOX) tablet 600 mg  600 mg Oral or Feeding Tube Q12H Velez Reyes, German, MD   600 mg at 06/26/24 0011    [Held by provider] magnesium chloride CR tablet 1,070 mg  1,070 mg Oral Once per day on Sunday Tuesday Thursday Saturday Josesito Pratt MD        melatonin tablet 3 mg  3 mg Oral or Feeding Tube At Bedtime Velez Reyes, German, MD   3 mg at 06/25/24 2041    metoprolol tartrate (LOPRESSOR) tablet 25 mg  25 mg Oral BID Velez Reyes, German, MD   25 mg at 06/26/24 0808    micafungin (MYCAMINE) 150 mg in sodium chloride 0.9 % 100 mL intermittent infusion  150 mg Intravenous Q24H Chio Cortez  mL/hr at 06/25/24 2305 150 mg at 06/25/24 2305    minocycline (MINOCIN) 100 mg in sodium chloride 0.9 % 100 mL intermittent infusion  100 mg Intravenous Q12H Kajal Chong APRN  mL/hr at 06/26/24 0010 100 mg at 06/26/24 0010    montelukast (SINGULAIR) tablet 10 mg  10 mg Oral At Bedtime Velez Reyes, German, MD   10 mg at 06/25/24 2207    pantoprazole (PROTONIX) 2 mg/mL suspension 40 mg  40 mg Per Feeding Tube Daily Josesito Pratt MD   40 mg at 06/25/24 1159    polyethylene glycol (MIRALAX) Packet 17 g  17 g Oral or Feeding Tube Daily Velez Reyes, German, MD   17 g at 06/25/24 1158    posaconazole (NOXAFIL) DR tablet TBEC 300 mg  300 mg Per Feeding Tube Daily Josesito Pratt MD   300 mg at 06/25/24 1159    [Held by provider] predniSONE (DELTASONE) tablet 5 mg  5 mg Oral Daily Velez Reyes, German, MD   5 mg at 06/21/24 0822    protein modular (PROSOURCE TF20) packet 1 packet  1 packet Per Feeding Tube BID Awa Watt MD   1 packet at 06/26/24 0740    QUEtiapine (SEROquel) tablet 150 mg  150 mg Oral or Feeding Tube At Bedtime Velez Reyes, German, MD   150 mg at 06/25/24 2041    sodium chloride (NEBUSAL) 3 % neb solution 3 mL  3 mL Nebulization BID Velez Reyes, German, MD   3 mL at 06/25/24 2134    sodium chloride (PF) 0.9% PF flush 10 mL  10 mL  Intracatheter Q8H Kajal Chong APRN CNP   10 mL at 06/26/24 0741    sodium chloride (PF) 0.9% PF flush 10 mL  10 mL Intracatheter Q8H Kajal Chong APRN CNP   10 mL at 06/26/24 0740    sulfamethoxazole-trimethoprim (BACTRIM) 400-80 MG per tablet 1 tablet  1 tablet Oral or Feeding Tube Daily Josesito Pratt MD   1 tablet at 06/25/24 2041    tacrolimus (GENERIC) suspension 0.5 mg  0.5 mg Oral or Feeding Tube BID IS Rufina Huff CNP   0.5 mg at 06/26/24 0740    valGANciclovir (VALCYTE) solution 450 mg  450 mg Per Feeding Tube Daily Rufina Huff CNP   450 mg at 06/26/24 0740    Vitamin D3 (CHOLECALCIFEROL) tablet 50 mcg  50 mcg Oral Once per day on Monday Wednesday Friday Velez Reyes, German, MD   50 mcg at 06/26/24 1009     Current Facility-Administered Medications   Medication Dose Route Frequency Provider Last Rate Last Admin    dexmedeTOMIDine (PRECEDEX) 4 mcg/mL in sodium chloride 0.9 % 100 mL infusion  0.1-1.2 mcg/kg/hr (Dosing Weight) Intravenous Continuous Chio Cortez MD 14 mL/hr at 06/26/24 1043 1.1 mcg/kg/hr at 06/26/24 1043    dextrose 10% infusion   Intravenous Continuous PRN Velez Reyes, German, MD        dextrose 10% infusion   Intravenous Continuous PRN Awa Watt MD        dialysate for CVVHD & CVVHDF (Phoxillum BK4/2.5)  12.5 mL/kg/hr CRRT Continuous Jonna Rodas  mL/hr at 06/26/24 1131 12.5 mL/kg/hr at 06/26/24 1131    fentaNYL (SUBLIMAZE) infusion   mcg/hr Intravenous Continuous Velez Reyes, German, MD 2 mL/hr at 06/26/24 1000 100 mcg/hr at 06/26/24 1000    heparin (porcine) 20,000 units in 20 mL ANTICOAGULANT infusion (syringe from pharmacy)  500 Units/hr CRRT Continuous Hardy Tran APRN CNS 0.5 mL/hr at 06/26/24 1100 500 Units/hr at 06/26/24 1100    insulin regular (MYXREDLIN) 1 unit/mL infusion  0-24 Units/hr Intravenous Continuous Velez Reyes, German, MD 5 mL/hr at 06/26/24 1100 5 Units/hr at 06/26/24 1100    phenylephrine  (DANDY-SYNEPHRINE) 50 mg in NaCl 0.9 % 250 mL infusion  0.1-6 mcg/kg/min (Dosing Weight) Intravenous Continuous Chio Cortez MD 15.2 mL/hr at 06/26/24 1122 1 mcg/kg/min at 06/26/24 1122    POST-filter replacement solution for CVVHD & CVVHDF (Phoxillum BK4/2.5)   CRRT Continuous Jonna Rodas  mL/hr at 06/25/24 1242 New Bag at 06/25/24 1242    PRE-filter replacement solution for CVVHD & CVVHDF (Phoxillum BK4/2.5)  12.5 mL/kg/hr CRRT Continuous Jonna Rodas  mL/hr at 06/26/24 1130 12.5 mL/kg/hr at 06/26/24 1130    vasopressin 1 unit/mL MAX Conc (PITRESSIN) infusion  1.8 Units/hr Intravenous Continuous Velez Reyes, German, MD 1.8 mL/hr at 06/26/24 1105 1.8 Units/hr at 06/26/24 1105

## 2024-06-26 NOTE — PROGRESS NOTES
"CRRT STATUS NOTE    DATA:  Time:  5:57 AM  Pressures WNL:  YES  Filter Status:  WDL    Problems Reported/Alarms Noted: None    Supplies Present:  YES    ASSESSMENT:  Patient Net Fluid Balance: Positive 9.4 L since admit, Positive 200 ml since midnight  Vital Signs: /50   Pulse 85   Temp 99.5  F (37.5  C)   Resp 18   Ht 1.6 m (5' 3\")   Wt 56.6 kg (124 lb 12.5 oz)   LMP 06/07/2014 (Exact Date)   SpO2 99%   BMI 22.10 kg/m    Labs: K+ 4.0, Cr 0.96, ICA 4.7, Hgb 6.9  Goals of Therapy: I=O to slightly positive (up to 0.5 L)    INTERVENTIONS:   None    PLAN:  Continue current plan of care per CRRT orders.  I=O as pt tolerates. Notify CRRT RN with questions or concerns.     "

## 2024-06-26 NOTE — PROGRESS NOTES
[facility]    Transplant Infectious Diseases Inpatient Progress note      Kecia Blue MRN# [id number]   YOB: 1960 Age: 63 year old   Date of Admission: 6/18/2024  1:47 PM  Transplant: 3/1/2018 (Lung), Postoperative day: 2309            Recommendations:   No ID changes today.   Please check blood cultures x2 if recurrent fever to 100.4F or greater  Continue current antimicrobials:  IV ceftazidime, equivalent to 2 gm q8 hr but adjust per renal function  IV minocycline 100 mg q12 hrs.   PO linezolid 600 mg q12 hrs  IV micafungin 150 mg daily.   PO posaconazole 300 mg daily  On azithromycin per transplant pulm. Valcyte and Bactrim ppx.   Await pending cultures. No further respiratory cultures necessary from ID standpoint unless decompensation.        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         Active Problems and Infectious Diseases Issues:     Acute hypercapnic and hypoxic respiratory failure, intubated 6/18/24  Shock, requiring 1-2 pressors - likely septic from respiratory source  Multilobar pneumonia, right  Right mainstem bronchial stenosis requiring dilations (dilated 2/15/24, s/p 6/20 dilation with stent placement)  Airways colonized with E faecalis, E faecium (VRE), S maltophilia, and now filamentous fungus and Candida guilliermondii.   It  is difficult to ascertain which of these pathogens is/are responsible for pneumonia vs likely airway colonizers. Will treat as above, covering all organisms at present due to shock.   It is possible that the bronchoscopy with dilation stirred the pathogens colonizing the airways and resulted in septic shock. Stent likely colonized. Respiratory status remains stable - though still on full vent support, with improvement down to 50% FiO2. Also on stress dose steroids for possible ARDS, further complicated by volume overload with limited ability to pull fluids on CRRT d/t pressor requirements and Afib.       Susceptibility     Stenotrophomonas maltophilia Enterococcus faecalis     JAYME JAYME     Ampicillin   <=2 ug/mL Susceptible     Ceftazidime >16 Resistant       Gentamicin Synergy   Resistant u... Resistant 1     Levofloxacin 8 Resistant       Minocycline 4 Susceptible       Trimethoprim/Sulfamethoxazole <=2/38 Susceptible       Vancomycin   1 ug/mL Susceptible                      Pyuria and bacteriuria in HD patient.   Urine culture with E. Coli x2, E. Faecium (VRE). The significance of these pathogens in this HD patient is not clear. Due to septic shock we are treating.   Daptomycin changed to linezolid on 6/24 in order to also cover the newly identified E faecium in the lungs.   Zosyn --> meropenem --> now to ceftazidime since E coli is not ESBL.    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   Now with positive A galactomannan in one of two BALs, with filamentous fungus growing (6/19 BAL only) while on therapeutic posaconazole.   Will add micafungin pending identification. 6/21 BAL with negative AGM and no fungal growth to date.   6/24 Sputum with Candida guilliermondii is airway colonizer, though is covered on current antifungal regimen.    8. CMV viremia  To 2k in 10/2023 in the setting of post obstructive pneumonia. After treatment and stopping of valcyte, recurrence of CMV viremia to 1k  in 02/2024, resumed valcyte ppx (renally dosed) and negative since 2/28/24. Valcyte again held in March 2024, with repeats negative.   CMV negative 6/18/24. Low positive 39 on 6/25, not unexpected at present in setting of acute illness. She is on Valcyte prophylaxis since 6/24 with steroid use. Continue to monitor.         Old Problems and Infectious Diseases Issues:     EBV viremia: elevated to 960k in 10/2021 - due to recent hospitalization, health stress at that time. Decreased to 135k 2/2022. Neg 6/27/22.   Increased to 1 million in 11/2023 s/p 1 dose of rituximab and decreasing/undetected in 05/2024. Negative EBV 6/19/24.    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 BAL showed septate hyphae. 7/2020 had hypodense mass in left lung with biopsy showing fungal hyphae, cx aspergillus. Started on voriconazole in 2019, changed to posaconazole in 2020, she remains on 300 mg daily.  - 1/2021 BAL cx with steno: tx ceftazidime for 2 weeks  - 1/2021-2/2021 - ARDS due to PJP pneumonia (had stopped TMP-SMX due to side effects). Recovery from this required tracheostomy.  - 2019 - She had also grown Actinomyces from multiple BAL    Other Infectious Disease issues include:  - QTc interval:  450 msec on 6/18/24  - Bacterial prophylaxis:  azithromycin per Tx pulm  - Pneumocystis prophylaxis:  Bactrim for life (hx PJP)  - Viral serostatus & prophylaxis: CMV D+/R+, EBV D+/R+   - Fungal prophylaxis:  posaconazole  - Immunization status:  Seasonal influenza, RSV, and COVID vaccine UTD  - Gamma globulin status:  979 on 10/27/23  - Isolation status: contact for VRE      Attestation:  Total duration of visit including chart review, reviewing labs and imaging, interviewing and examining the patient, documentation, and sending communication to the patient and to the primary treating team, all at the same day of this encounter, is: 50 minutes.    DAVIDA RAMAN PA-C  [facility]  [facility]  Contact information available via [facility] Paging/Directory or [employer]    06/26/2024      Interim History and Events:   Still intermittent Afib, did improve once with metoprolol. CRRT essentially I=O today per bedside RN. Tmax yesterday 100.4F. Still on pressors x1-2. Afebrile today, WBC down to 3.0. Vent settings stable 50% FiO2, failed breathing trial d/t tachypnea today. Hemoglobin 6.9, s/p 1 unit pRBCs.     Valcyte ppx resumed 6/24 with initiation of stress dose steroids.   Blood culture 6/24 x2 - NGTD  Had 2 BM overnight, 1 this morning.     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      History obtained from chart review, patient's , and patient. Reported increased lethargy reported for 2-3 days PTA, poor PO intake, more tired, and weak cough. Unable to bring up sputum. Denied fever, chills, urinary symptoms (on HD), diarrhea, abdominal pain at home. Her and  both reported sinus congestion recently,  improved quickly and Kecia declined. Denies sore throat, headache. Afebrile and without leukocytosis on admission. WBC 4.0, ESR 10, lactic 0.6, and CRP ~8. Put on BIPAP given respiratory acidosis with some improvement, however unable to tolerate off BIPAP and intubated for failure to improve hypercapnia. Started on azithromycin, vancomycin, and zosyn. Negative: RVP, flu/covid/rsv, MRSA nares, Cryptococcus antigen. Pending: EBV, CMV, blood  culture, fungitell, Histo Ag, AGM, and sputum culture     CT chest-hi res on 6/18/24 notable for slight decreased confluent consolidative opacities in right lower lobe with more micronodular component of possible infection in the right lower lobe. Other abnormalities throughout the lungs similar to February of the transplanted lungs - pleural effusions, anastomoses intact with narrowing of R mainstem anastomosis. New narrowing of right bronchus intermedius with occlusion of RML bronchus (mucus plug per pulm). Febrile 100.7F following intubation, WBC 3.2, CRP up to 19, procal 0.24 in ESRD, nl lactic. Remains intubated, though on PS. On pressor x1 norepi, low dose 0.05. Nursing reports lots of secretion burden and weak cough. Planning for bronchoscopy today.     Has previously scheduled stent placement on 6/26/24 as outpatient.     ROS:  As mentioned in the interim history otherwise negative by reviewing constitutional symptoms, central and peripheral neurological systems, respiratory system, cardiac system, GI system,  system, musculoskeletal, skin, allergy, and lymphatics.                 Physical Examination:  Temp: 98.9  F (37.2  C) Temp src: Axillary BP: 117/50 Pulse: 86   Resp: 18 SpO2: 100 % O2 Device: Mechanical Ventilator    Vitals:    06/22/24 0200 06/23/24 0500 06/24/24 0400 06/25/24 0130   Weight: 57.6 kg (126 lb 15.8 oz) 57.7 kg (127 lb 3.3 oz) 56.7 kg (125 lb) 57.4 kg (126 lb 8.7 oz)    06/26/24 0300   Weight: 56.6 kg (124 lb 12.5 oz)     Constitutional: awake, less confused but tired today, falls asleep during conversation. Orally intubated.  Lungs: Lungs coarse bilaterally, upper R > L. Inspiratory wheezing noted.  CVS: RRR, normal S1/S2  GI: No abdominal tenderness. Normal bowel sounds.  Extremities: 2+ edema. Cool, dusky toes.  Neuro: follows commands, moves all extremities    Medications:  Medications that Require Transfusion:   Current Facility-Administered Medications   Medication Dose Route  Frequency Provider Last Rate Last Admin    dexmedeTOMIDine (PRECEDEX) 4 mcg/mL in sodium chloride 0.9 % 100 mL infusion  0.1-1.2 mcg/kg/hr (Dosing Weight) Intravenous Continuous Chio Cortez MD 14 mL/hr at 06/26/24 1043 1.1 mcg/kg/hr at 06/26/24 1043    dextrose 10% infusion   Intravenous Continuous PRN Velez Reyes, German, MD        dextrose 10% infusion   Intravenous Continuous PRN Awa Watt MD        dialysate for CVVHD & CVVHDF (Phoxillum BK4/2.5)  12.5 mL/kg/hr CRRT Continuous Jonna Rodas  mL/hr at 06/26/24 1131 12.5 mL/kg/hr at 06/26/24 1131    fentaNYL (SUBLIMAZE) infusion   mcg/hr Intravenous Continuous Velez Reyes, German, MD 2 mL/hr at 06/26/24 1000 100 mcg/hr at 06/26/24 1000    heparin (porcine) 20,000 units in 20 mL ANTICOAGULANT infusion (syringe from pharmacy)  500 Units/hr CRRT Continuous Hardy Tran APRN CNS 0.5 mL/hr at 06/26/24 1200 500 Units/hr at 06/26/24 1200    insulin regular (MYXREDLIN) 1 unit/mL infusion  0-24 Units/hr Intravenous Continuous Velez Reyes, German, MD 5 mL/hr at 06/26/24 1100 5 Units/hr at 06/26/24 1100    phenylephrine (DANDY-SYNEPHRINE) 50 mg in NaCl 0.9 % 250 mL infusion  0.1-6 mcg/kg/min (Dosing Weight) Intravenous Continuous Chio Cortez MD 7.6 mL/hr at 06/26/24 1137 0.5 mcg/kg/min at 06/26/24 1137    POST-filter replacement solution for CVVHD & CVVHDF (Phoxillum BK4/2.5)   CRRT Continuous Jonna Rodas  mL/hr at 06/25/24 1242 New Bag at 06/25/24 1242    PRE-filter replacement solution for CVVHD & CVVHDF (Phoxillum BK4/2.5)  12.5 mL/kg/hr CRRT Continuous Jonna Rodas  mL/hr at 06/26/24 1130 12.5 mL/kg/hr at 06/26/24 1130    vasopressin 1 unit/mL MAX Conc (PITRESSIN) infusion  1.8 Units/hr Intravenous Continuous Velez Reyes, German, MD 1.8 mL/hr at 06/26/24 1105 1.8 Units/hr at 06/26/24 1105     Scheduled Medications:   Current Facility-Administered Medications   Medication Dose Route Frequency Provider Last  Rate Last Admin    acetylcysteine (MUCOMYST) 10 % nebulizer solution 4 mL  4 mL Inhalation BID Velez Reyes, German, MD   4 mL at 06/26/24 0834    azithromycin (ZITHROMAX) tablet 250 mg  250 mg Oral or Feeding Tube Daily Velez Reyes, German, MD   250 mg at 06/26/24 0740    B and C vitamin Complex with folic acid (NEPHRONEX) liquid 5 mL  5 mL Per Feeding Tube Daily Awa Watt MD   5 mL at 06/26/24 0740    calcium carbonate (TUMS) chewable tablet 500 mg  500 mg Oral Once per day on Sunday Tuesday Thursday Saturday Josesito Pratt MD   500 mg at 06/25/24 0910    cefTAZidime (FORTAZ) 2 g vial to attach to  ml bag for ADULTS or NS 50 ml bag for PEDS  2 g Intravenous Q8H Chio Cortez MD   2 g at 06/26/24 0504    chlorhexidine (PERIDEX) 0.12 % solution 15 mL  15 mL Mouth/Throat Q12H Chio Cortez MD   15 mL at 06/26/24 0740    fludrocortisone (FLORINEF) tablet 0.1 mg  0.1 mg Oral or Feeding Tube Daily Evelyn Roberts APRN CNP   0.1 mg at 06/26/24 0740    hydrocortisone sodium succinate PF (solu-CORTEF) injection 50 mg  50 mg Intravenous BID Velez Reyes, German, MD        levalbuterol (XOPENEX) neb solution 0.63 mg  0.63 mg Nebulization 4x Daily Gareth Mosquera MD   0.63 mg at 06/26/24 0834    linezolid (ZYVOX) tablet 600 mg  600 mg Oral or Feeding Tube Q12H Velez Reyes, German, MD   600 mg at 06/26/24 1138    [Held by provider] magnesium chloride CR tablet 1,070 mg  1,070 mg Oral Once per day on Sunday Tuesday Thursday Saturday Josesito Pratt MD        melatonin tablet 3 mg  3 mg Oral or Feeding Tube At Bedtime Velez Reyes, German, MD   3 mg at 06/25/24 2041    metoprolol tartrate (LOPRESSOR) tablet 25 mg  25 mg Oral BID Velez Reyes, German, MD   25 mg at 06/26/24 0808    micafungin (MYCAMINE) 150 mg in sodium chloride 0.9 % 100 mL intermittent infusion  150 mg Intravenous Q24H Chio Cortez  mL/hr at 06/25/24 2305 150 mg at 06/25/24 2305    minocycline (MINOCIN) 100  mg in sodium chloride 0.9 % 100 mL intermittent infusion  100 mg Intravenous Q12H Kajal Chong APRN  mL/hr at 06/26/24 1228 100 mg at 06/26/24 1228    montelukast (SINGULAIR) tablet 10 mg  10 mg Oral At Bedtime Velez Reyes, German, MD   10 mg at 06/25/24 2207    pantoprazole (PROTONIX) 2 mg/mL suspension 40 mg  40 mg Per Feeding Tube Daily Josesito Pratt MD   40 mg at 06/26/24 1138    polyethylene glycol (MIRALAX) Packet 17 g  17 g Oral or Feeding Tube Daily Velez Reyes, German, MD   17 g at 06/25/24 1158    posaconazole (NOXAFIL) DR tablet TBEC 300 mg  300 mg Per Feeding Tube Daily Josesito Pratt MD   300 mg at 06/26/24 1139    [Held by provider] predniSONE (DELTASONE) tablet 5 mg  5 mg Oral Daily Velez Reyes, German, MD   5 mg at 06/21/24 0822    protein modular (PROSOURCE TF20) packet 1 packet  1 packet Per Feeding Tube BID Awa Watt MD   1 packet at 06/26/24 0740    QUEtiapine (SEROquel) tablet 150 mg  150 mg Oral or Feeding Tube At Bedtime Velez Reyes, German, MD   150 mg at 06/25/24 2041    sodium chloride (NEBUSAL) 3 % neb solution 3 mL  3 mL Nebulization BID Velez Reyes, German, MD   3 mL at 06/25/24 2134    sodium chloride (PF) 0.9% PF flush 10 mL  10 mL Intracatheter Q8H Kajal Chong APRN CNP   10 mL at 06/26/24 0741    sodium chloride (PF) 0.9% PF flush 10 mL  10 mL Intracatheter Q8H Kajal Chong APRN CNP   10 mL at 06/26/24 0740    sulfamethoxazole-trimethoprim (BACTRIM) 400-80 MG per tablet 1 tablet  1 tablet Oral or Feeding Tube Daily Josesito Pratt MD   1 tablet at 06/25/24 2041    tacrolimus (GENERIC) suspension 0.5 mg  0.5 mg Oral or Feeding Tube BID IS Rufina Huff CNP   0.5 mg at 06/26/24 0740    valGANciclovir (VALCYTE) solution 450 mg  450 mg Per Feeding Tube Daily Rufina Huff CNP   450 mg at 06/26/24 0740    Vitamin D3 (CHOLECALCIFEROL) tablet 50 mcg  50 mcg Oral Once per day on Monday Wednesday Friday Velez Reyes, German, MD   50 mcg at  "06/26/24 1009         Laboratory Data:   No results found for: \"ACD4\"    Inflammatory Markers    Recent Labs   Lab Test 06/18/24  1418 10/29/23  0642 10/28/23  0725 10/07/19  1221 10/06/19  1444 09/13/19  0934 09/11/19  2325 08/22/19  0820   SED 10 66* 58* 93*  --  111* 102*  --    CRP  --   --   --   --  25.0* 58.0* 66.0* 47.0*       Immune Globulin Studies     Recent Labs   Lab Test 06/19/24  0839 10/27/23  0701 10/24/23  1033 09/15/21  0915 01/31/21  0441 01/25/21  0406 10/27/19  0621 03/01/18  0356 02/19/18  0759    979  --  1,198 763  --  936 698 1,790*   IGM  --   --   --   --   --   --   --   --  502*   IGE  --   --  <2  --   --  <2  --   --  <2   IGA  --   --   --   --   --   --   --   --  425*   IGG1  --   --   --   --   --   --   --   --  1,300*   IGG2  --   --   --   --   --   --   --   --  131*   IGG3  --   --   --   --   --   --   --   --  101   IGG4  --   --   --   --   --   --   --   --  1*       Metabolic Studies       Recent Labs   Lab Test 06/26/24  1058 06/26/24  0853 06/26/24  0754 06/26/24  0657 06/26/24  0605 06/26/24  0517 06/26/24  0407 06/26/24  0310 06/25/24  1847 06/25/24  1826 06/25/24  0350 06/25/24  0348 06/24/24  1942 06/24/24  1816 06/24/24  1614 06/24/24  1610 06/24/24  0825 06/24/24  0335 06/24/24  0013 06/23/24 2006 06/23/24  1628 06/23/24  1626 06/21/24  1208 06/21/24  0356   NA  --   --   --   --   --   --   --  138  --  138  --  136  --   --   --  137  --  135  --   --   --  137   < > 134*  134*   POTASSIUM  --   --   --   --   --   --   --  4.0  --  3.8  --  4.1  --   --   --  4.3  --  4.1  --   --   --  4.2   < > 3.6  3.6   CHLORIDE  --   --   --   --   --   --   --  107  --  105  --  105  --   --   --  106  --  106  --   --   --  107   < > 103  103   CO2  --   --   --   --   --   --   --  23  --  22  --  22  --   --   --  22  --  23  --   --   --  23   < > 20*  20*   ANIONGAP  --   --   --   --   --   --   --  8  --  11  --  9  --   --   --  9  --  6*  --   --   " --  7   < > 11  11   BUN  --   --   --   --   --   --   --  39.1*  --  38.7*  --  40.7*  --   --   --  38.0*  --  34.4*  --   --   --  29.7*   < > 32.3*  32.3*   CR  --   --   --   --   --   --   --  0.96*  --  0.97*  --  1.02*  --   --   --  1.02*  --  1.02*  --   --   --  1.04*   < > 2.72*  2.72*   GFRESTIMATED  --   --   --   --   --   --   --  67  --  66  --  62  --   --   --  62  --  62  --   --   --  61   < > 19*  19*   * 146* 155* 141* 99 126*   < > 193*   < > 196*   < > 279*   < >  --    < > 255*   < > 207*   < >  --    < > 202*   < > 158*  155*  155*   A1C  --   --   --   --   --   --   --   --   --   --   --  5.1  --   --   --   --   --   --   --   --   --   --   --   --    CHELSEY  --   --   --   --   --   --   --  7.6*  --  8.1*  --  8.0*  --   --   --  8.0*  --  8.1*  --   --   --  8.1*   < > 7.9*  7.9*   PHOS  --   --   --   --   --   --   --  4.0  --  4.0  --  3.9  --   --   --  4.1  --  3.3  --   --   --  3.4   < > 2.6   MAG  --   --   --   --   --   --   --  2.4*  --  2.3  --  2.4*  --   --   --  2.4*  --  2.4*  --   --   --  2.3   < > 2.2   LACT  --   --   --   --   --   --   --   --   --   --   --   --   --  0.9  --   --   --   --   --  0.9  --  0.7   < >  --    CKT  --   --   --   --   --   --   --   --   --   --   --   --   --   --   --   --   --  24*  --   --   --   --   --  23*    < > = values in this interval not displayed.       Hepatic Studies    Recent Labs   Lab Test 06/26/24  0310 06/25/24  1826 06/25/24  0348 06/24/24  1610 06/24/24  0335 06/23/24  1626 06/23/24  0414 06/22/24  1613 06/22/24  0439 06/21/24  1626 06/21/24  0356 06/19/24  0613 06/18/24  1803 09/15/21  0915 05/01/21  0654 01/27/21  0414 01/26/21  0425   BILITOTAL 0.8  --  0.7  --  0.7  --  0.8  --  1.0  --  1.1   < > 0.6   < >  --    < > 0.8   ALKPHOS 134  --  114  --  79  --  78  --  89  --  123   < > 235*   < >  --    < > 184*   ALBUMIN 2.6* 2.7* 2.4* 2.5* 2.4* 2.4* 2.4*   < > 2.3*  2.3*   < > 2.4*  2.4*   <  > 3.6   < >  --    < > 2.0*   AST 22  --  16  --  12  --  17  --  11  --  12   < > 39   < >  --    < > 11   ALT 38  --  28  --  18  --  20  --  17  --  20   < > 39   < >  --    < > 30   LDH  --   --   --   --   --   --   --   --   --   --   --   --   --   --  164  --  187   GGT  --   --   --   --   --   --   --   --   --   --   --   --  147*  --   --   --   --     < > = values in this interval not displayed.       Pancreatitis testing    Recent Labs   Lab Test 06/18/24  1803 10/25/23  1356 05/26/22  0750 09/15/21  0915 01/24/21  0000 02/19/20  0713 12/31/19  1033 10/24/19  2032 10/21/19  1451 10/06/19  1444 09/11/19  2325 03/04/18  0353 02/19/18  0759   AMYLASE  --   --   --   --   --   --   --   --   --   --   --   --  58   LIPASE 40 24  --   --   --   --   --  212 119 172 127  --   --    TRIG  --   --  155* 68 242* 77 98  --   --   --   --  191* 115       Hematology Studies      Recent Labs   Lab Test 06/26/24  1200 06/26/24  0310 06/25/24  0348 06/24/24  0335 06/23/24  0414 06/22/24  1615 06/22/24  0439 06/22/24  0034 06/21/24  0356   WBC  --  3.0* 3.0* 3.6* 5.4 6.0 6.0   < > 5.8   ANEU  --  1.7 1.4* 2.8 4.6  --  5.2  --  5.1   ALYM  --  1.1 1.1 0.3* 0.1*  --  0.0*  --  0.1*   SHERRI  --  0.2 0.3 0.4 0.4  --  0.7  --  0.5   AEOS  --  0.0 0.0 0.0 0.2  --  0.1  --  0.1   HGB 8.8* 6.9* 7.0* 7.5* 7.8* 8.0* 8.1*   < > 9.6*   HCT  --  23.1* 23.0* 23.7* 24.9* 25.3* 25.9*   < > 29.8*   PLT  --  86* 70* 69* 84* 89* 95*   < > 98*    < > = values in this interval not displayed.       Arterial Blood Gas Testing    Recent Labs   Lab Test 06/26/24  0310 06/25/24  1209 06/24/24  2126 06/24/24  1653 06/24/24  0335   PH 7.35 7.29* 7.33* 7.30* 7.37   PCO2 44 51* 45 50* 41   PO2 137* 60* 133* 80 123*   HCO3 24 24 24 25 24   O2PER 50 50 60 60 50        Urine Studies     Recent Labs   Lab Test 06/19/24  1214 01/24/21  1729 10/21/19  2240 09/12/19  0125 08/07/19  1512   URINEPH 6.5 5.0 5.0 7.5* 7.0   NITRITE Negative Negative Negative  "Negative Negative   LEUKEST Large* Moderate* Large* Negative Trace*   WBCU >182* 34* 115* 3 19*       Vancomycin Levels     Recent Labs   Lab Test 06/19/24  0613 10/26/23  0606 09/21/21  1911 01/28/21  0412 01/26/21  0425 01/25/21  1259   VANCOMYCIN 15.6 18.6 18.8 21.6 31.4* 15.1       Tobramycin levels     No lab results found.    Gentamicin levels    No lab results found.    Tacrolimus levels    Invalid input(s): \"TACROLIMUS\", \"TAC\", \"TACR\"      Latest Ref Rng & Units 6/26/2024     3:10 AM 6/25/2024     6:26 PM 6/25/2024     3:48 AM 6/24/2024     4:10 PM 6/24/2024     3:35 AM   Transplant Immunosuppression Labs   Creat 0.51 - 0.95 mg/dL 0.96  0.97  1.02  1.02  1.02    Urea Nitrogen 8.0 - 23.0 mg/dL 39.1  38.7  40.7  38.0  34.4    WBC 4.0 - 11.0 10e3/uL 3.0   3.0   3.6    Neutrophil % 57   48   78    ANEU 1.6 - 8.3 10e3/uL 1.7   1.4   2.8        Cyclosporine levels    Invalid input(s): \"CYCLOSPORINE\", \"CYC\"    Mycophenolate levels    Invalid input(s): \"MYPA\", \"MYP\"    Sirolimus levels    Invalid input(s): \"SIROLIMUS\", \"SIR\", \"RAPA\"    CSF testing   No lab results found.      Microbiology:   6/19  CrAg - negative  Urine Strep and Legionella antigens - negative  Urine culture - GNB  BAL - negative KOH, pending aerobic + fungal + AFB cultures    6/18  Negative RVP and COVID/flu/RSV  Histoplasma antigen (blood) - pending  Aspergillus galactomannan - negative  Fungitell - negative  Blood cultures x2 - NGTD    Sputum culture - Stenotrophomonas maltophilia      Fungal testing  Recent Labs   Lab Test 06/24/24  0335 06/21/24  1706 06/19/24  1513 06/18/24  1803 10/07/19  1544 09/14/19  1625   FGTL  --   --   --  <31   < > 122   FGTLI  --   --   --  Negative   < > Positive*   PJRDFA  --   --  Not Detected  --    < >  --    ASPGAI 0.04 0.06 1.87 0.03   < > 1.08   ASPAG  --  Negative Positive*  --    < >  --    ASPGAA Negative  --   --  Negative   < > Positive*   CIABB  --   --   --   --   --  <1:2   COFUNG  --   --   --  <1:2  " --   --     < > = values in this interval not displayed.       Last Culture results   S Pneumoniae Antigen   Date Value Ref Range Status   01/24/2021   Final    Negative, no Streptococcus pneumoniae antigen detected by immunochromatographic membrane   assay. A negative Streptococcus pneumoniae antigen result does not rule out infection with   Streptococcus pneumoniae.       Streptococcus pneumoniae antigen   Date Value Ref Range Status   06/19/2024 Negative Negative Final     Comment:     A negative Streptococcus pneumoniae antigen result does not rule out infection with Streptococcus pneumoniae.     P. jirovecii By PCR   Date Value Ref Range Status   06/19/2024 Not Detected  Final     Comment:       NOT DETECTED - A negative result does not rule out the   presence of PCR inhibitors in the patient specimen or assay   specific nucleic acid in concentrations below the level of   detection by the assay.    This test was developed and its performance characteristics   determined by [facility]. It has not been cleared or   approved by the [place] Food and Drug Administration. This test   was performed in a CLIA certified laboratory and is   intended for clinical purposes.  Performed By: [facility]  [address] [place] [id number]  : Rogelio Llanos MD, PhD  IA Number: [id number]   10/17/2023 Not Detected  Final     Comment:     NOT DETECTED - A negative result does not rule out the   presence of PCR inhibitors in the patient specimen or   assay specific nucleic acid in concentrations below the   level of detection by the assay.    This test was developed and its performance characteristics   determined by [facility]. It has not been cleared or   approved by the [place] Food and Drug Administration. This test   was performed in a CLIA certified laboratory and is   intended for clinical purposes.  Performed By: [facility]  [address] [place] [address]  [id number]  : Rogelio Llanos MD, PhD  CLIA Number: [id number]   08/17/2023 Not Detected  Final     Comment:     NOT DETECTED - A negative result does not rule out the   presence of PCR inhibitors in the patient specimen or   assay specific nucleic acid in concentrations below the   level of detection by the assay.    This test was developed and its performance characteristics   determined by [facility]. It has not been cleared or   approved by the US Food and Drug Administration. This test   was performed in a CLIA certified laboratory and is   intended for clinical purposes.  Performed By: [facility]  [address] [place], [place] [id number]  : Rogelio Llanos MD, PhD  CLIA Number: [id number]   08/17/2023 Not Detected  Final     Comment:     NOT DETECTED - A negative result does not rule out the   presence of PCR inhibitors in the patient specimen or   assay specific nucleic acid in concentrations below the   level of detection by the assay.    This test was developed and its performance characteristics   determined by [facility]. It has not been cleared or   approved by the US Food and Drug Administration. This test   was performed in a CLIA certified laboratory and is   intended for clinical purposes.  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  Results should be used to aid in the diagnosis of PCP  pneumonia and must be interpreted in the context of host  risk factors, clinical presentation, and radiographic  imaging.  TEST INFORMATION: Pneumocystis jirovecii Detection by PCR  Test developed and characteristics determined by [facility]. See Compliance Statement B: [employer]  Performed by [facility] [place] [address] [phone]  [email or web address], Carri Red MD, Lab. Director       Culture   Date Value Ref Range Status   06/24/2024 1+ Stenotrophomonas maltophilia (A)  Final     Comment:     Susceptibilities done on previous cultures   06/24/2024 1+ Enterococcus faecium VRE (A)  Final     Comment:     Susceptibilities done on previous cultures   06/24/2024 1+ Candida guilliermondii complex (A)  Final     Comment:     Susceptibilities not routinely done, refer to antibiogram to view typical susceptibility profiles   06/24/2024 No growth after 2 days  Preliminary   06/24/2024 No growth after 2 days  Preliminary   06/21/2024 1+ Stenotrophomonas maltophilia (A)  Final     Comment:     Susceptibilities done on previous cultures   06/21/2024 1+ Enterococcus faecium VRE (A)  Final   06/21/2024 1+ Enterococcus faecalis (A)  Final     Comment:     Susceptibilities done on previous cultures   06/21/2024 No growth after 4 days  Preliminary   06/21/2024 No growth after 4 days  Preliminary   06/21/2024 No growth after 4 days  Preliminary   06/19/2024 1+ Stenotrophomonas maltophilia (A)  Final   06/19/2024 1+ Enterococcus faecalis (A)  Final   06/19/2024 Candida guilliermondii complex (A)  Preliminary   06/19/2024 Filamentous fungus (A)  Preliminary   06/19/2024 >100,000 CFU/mL Escherichia coli (A)  Final   06/19/2024 >100,000 CFU/mL Enterococcus faecium VRE (A)  Final   06/19/2024 50,000-100,000 CFU/mL Escherichia coli (A)  Final   06/18/2024 3+ Normal alo  Final   06/18/2024 2+ Stenotrophomonas maltophilia (A)  Final   06/18/2024 2+ Stenotrophomonas maltophilia (A)  Final     GS Culture   Date Value Ref Range Status   06/21/2024 See corresponding culture for results  Final   06/19/2024 See corresponding culture for results  Final     Culture Micro   Date Value Ref Range Status   05/01/2021   Final    Quantity not sufficient  Called to Maxim Norman at 1236 5/1/21.    mlb     05/01/2021 Culture negative after 30 days   Final   04/29/2021 No anaerobes isolated  Final   04/29/2021 No growth  Final   02/19/2021 Culture negative for acid fast bacilli  Final   02/19/2021   Final    Assayed at [facility], [employer] [address] [place] [address] [phone]   02/19/2021 Culture negative after 4 weeks  Final   02/19/2021 No growth after 4 weeks  Final   02/19/2021 Moderate growth  Staphylococcus epidermidis   (A)  Final   02/09/2021 No growth  Final         Last check of C difficile  C Diff Toxin B PCR   Date Value Ref Range Status   02/13/2021 Negative NEG^Negative Final     Comment:     Negative: C. difficile target DNA sequences NOT detected, presumed negative   for C.difficile toxin B or the number of bacteria present may be below the   limit of detection for the test.  FDA approved assay performed using [employer] GeneXpert real-time PCR.  A negative result does not exclude actual disease due to C. difficile and may   be due to improper collection, handling and storage of the specimen or the   number of organisms in the specimen is below the detection limit of the assay.       C Difficile Toxin B by PCR   Date Value Ref Range Status   06/22/2024 Negative Negative Final     Comment:     A negative result does not exclude actual disease due to C. difficile and may be due to improper collection, handling and storage of the specimen or the number of organisms in the specimen is below the detection limit of the assay.       Virology:  Coronavirus-19 testing    Recent Labs   Lab Test 06/18/24  1729 06/18/24  1433 06/17/24  1337 07/11/23  1132 10/12/22  1528 09/26/22  0956 05/23/22  1013 04/01/22  1257 02/07/22  1301 01/31/22  1309 01/24/22  1324 09/20/21  1859 06/07/21  1310 05/02/21  0715 04/26/21  1151 04/08/21  0723   [id number]  --   --   --   --   --   --   --   --   --   --   --   --   --  Negative  --   --    [id number]  --   --   --   --   --   --   --   --   --   --   --   --   --  Not Applicable  --   --    [id number] Negative  Negative  --  Negative  --   --   --   --   --   --   --  Negative   < >  --   --  Test received-See reflex to IDDL test SARS CoV2 (COVID-19) Virus RT-PCR  NEGATIVE   [id number]  --   --   --   --   --   --   --   --   --   --   --   --   --   --   --  Nasopharyngeal   [id number]  --   --   --   --   --   --   --   --   --   --   --   --   --   --   --  Nasopharyngeal   COVIDPCREXT  --   --  Negative  --  Negative Negative Undetected   < > Undetected Undetected   < >  --    < >  --   --   --    SOUREXT  --   --   --   --   --   --  Nasopharynx  --  Nasopharynx Nasopharynx   < >  --    < >  --    < >  --    [id number]  --   --   --   --   --   --  Undetected  --  Undetected Undetected   < >  --    < >  --    < >  --     < > = values in this interval not displayed.       Respiratory virus (non-coronavirus-19) testing    Recent Labs   Lab Test 06/18/24  1729 06/18/24  1433 11/01/23  0920 10/27/23  0449 01/24/21  1822 01/24/21  1629 12/23/20  1102 02/27/18  1016 02/22/18  0900   RVSPEC  --   --   --   --   --  Bronchial Bronchial   < >  --    AFLU  --   --   --   --   --   --   --   --  Duplicate request*   IFLUA Not Detected  --  Not Detected Not Detected   < > Negative Negative   < >  --    INFZA Negative   < >  --   --   --   --   --   --  Negative   FLUAH1 Not Detected  --  Not Detected Not Detected   < > Negative Negative   < >  --    RS0023 Not Detected  --  Not Detected Not Detected   < > Negative Negative   < >  --    FLUAH3 Not Detected  --  Not Detected Not Detected   < > Negative Negative   < >  --    BFLU  --   --   --   --   --   --   --   --  Duplicate request*   IFLUB Not Detected  --  Not Detected Not Detected   < > Negative Negative   < >  --    INFZB Negative   < >  --   --   --   --   --   --  Negative   PIV1 Not Detected  --  Not Detected Not Detected   < > Negative Negative   < >  --    PIV2 Not Detected  --  Not Detected Not Detected   < > Negative Negative   < >  --    PIV3 Not Detected  --   Not Detected Not Detected   < > Negative Negative   < >  --    PIV4 Not Detected  --  Not Detected Not Detected   < >  --   --    < >  --    IRSV Negative   < >  --   --   --   --   --   --  Negative   HRVS  --   --   --   --   --  Negative Negative   < >  --    RSVA Not Detected  --  Not Detected Not Detected   < > Negative Negative   < >  --    RSVB Not Detected  --  Not Detected Not Detected   < > Negative Negative   < >  --    HMPV Not Detected  --  Not Detected Not Detected   < > Negative Negative   < >  --    ADVBE  --   --   --   --   --  Negative Negative   < >  --    ADVC  --   --   --   --   --  Negative Negative   < >  --    ADENOV Not Detected  --  Not Detected Not Detected   < >  --   --    < >  --    CORONA Not Detected  --  Not Detected Not Detected   < >  --   --    < >  --     < > = values in this interval not displayed.       CMV viral loads    Recent Labs   Lab Test 06/25/24  0647 06/18/24  1803 06/12/24  0828 03/13/24  0853 03/05/24  0925 02/21/24  0838 02/14/24  1050 12/27/23  0834 12/19/23  1138 11/29/23  0826 11/21/23  0752 11/08/23  0846 11/01/23  0920 10/31/23  0606 10/24/23  1033 10/17/23  1011 10/04/23  0810 09/07/23  0710 08/17/23  1144 08/17/23  1137 08/08/23  0828 08/08/23  0828 07/11/23  0952 05/26/23  0828 04/06/23  0725 03/08/23  0848 02/09/23  1034 11/18/22  0928 09/27/22  1012 03/15/21  0904 02/25/21  0542   CMVQNT  --  <35*  --   --  Not Detected  --   --   --  Not Detected  --  Not Detected  --   --  <35*  --   --   --   --   --   --   --   --  Not Detected  --  Not Detected  --  <137*  --  Not Detected   < > CMV DNA Not Detected   CMVRESINST 39*  --   --   --   --   --  350*  --   --   --   --   --   --   --  103*  80* 75*  --  521*  --   --   --  46*  --   --   --   --   --   --   --   --   --    CSPEC  --   --   --   --   --   --   --   --   --   --   --   --   --   --   --   --   --   --   --   --   --   --   --   --   --   --   --   --   --   --  EDTA PLASMA   CMVLOG 1.6  "<1.5  --   --   --   --  2.5  --   --   --   --   --   --  <1.5 2.0  1.9 1.9  --  2.7  --   --    < > 1.7  --   --   --   --  <2.1  --   --    < > Not Calculated   43247  --   --  46*   < >  --    < >  --    < >  --    < >  --    < >  --   --   --   --    < >  --   --   --   --   --   --    < >  --    < >  --    < >  --    < >  --    CMVQAL  --   --   --   --   --   --   --   --   --   --   --   --  Not Detected  --   --   --   --   --  Not Detected Not Detected  --   --   --   --   --   --   --   --   --   --   --     < > = values in this interval not displayed.       No results found for: \"H6RES\"    EBV DNA Copies/mL   Date Value Ref Range Status   05/01/2021 38,186 (A) EBVNEG^EBV DNA Not Detected [Copies]/mL Final   02/22/2021 75,709 (A) EBVNEG^EBV DNA Not Detected [Copies]/mL Final   01/25/2021 5,619 (A) EBVNEG^EBV DNA Not Detected [Copies]/mL Final   12/09/2020 10,686 (A) EBVNEG^EBV DNA Not Detected [Copies]/mL Final   09/09/2020 2,935 (A) EBVNEG^EBV DNA Not Detected [Copies]/mL Final   03/09/2020 8,918 (A) EBVNEG^EBV DNA Not Detected [Copies]/mL Final   02/19/2020 38,419 (A) EBVNEG^EBV DNA Not Detected [Copies]/mL Final   12/18/2019 11,933 (A) EBVNEG^EBV DNA Not Detected [Copies]/mL Final   11/11/2019 9,669 (A) EBVNEG^EBV DNA Not Detected [Copies]/mL Final   10/24/2019 13,391 (A) EBVNEG^EBV DNA Not Detected [Copies]/mL Final   08/01/2018 EBV DNA Not Detected EBVNEG^EBV DNA Not Detected [Copies]/mL Final       BK viral loads   Recent Labs   Lab Test 08/07/19  0959   BKSPEC Plasma   BKRES BK Virus DNA Not Detected         Imaging:  CXR 6/26/24  Impression: Stable support devices. Similar pulmonary opacities, left  more than right, compared to prior radiograph from 6/24/2024,  representing edema/infection with atelectasis.    CXR 6/24/24  Impression:   1. Overall improved aeration in the left lung with persistent  opacities in the left lung and right lung base, likely improving  pulmonary edema or infection.  2. " Stable support devices.    XR Chest Port 1 View  Result Date: 6/20/2024  Impression: Bilateral pulmonary opacities, mildly increased in the upper lobes suggesting increased edema and underlying infection. The endotracheal tube projects 2.3 cm above the gee.    XR Chest Port 1 View  Result Date: 6/19/2024  IMPRESSION: ET tube tip projects 1.6 cm above the gee. Small bilateral pleural effusions with bibasilar, right greater than left airspace opacities suspicious for infection versus atelectasis.     CT Chest Hi-Resolution wo Contrast  Result Date: 6/18/2024  IMPRESSION: Bilateral transplanted lungs. Slight decreased confluent consolidative opacities in right lower lobe with more micronodular component of possible infection in the right lower lobe. Other abnormalities throughout the lungs similar to February of the transplanted lungs. Pleural effusions again noted. Narrowing of the bronchus intermedius on the right there appears to be occlusion of the right middle lobe bronchus with narrowing of the distal aspect of the right lower lobe bronchus with short segment occlusions of the medial and posterior basilar bronchial segments to the right lower lobe. No such abnormality on the left.. No ectopic air. Anastomoses appear grossly intact bilaterally with just under approximate 50% narrowing distal to the right mainstem anastomosis proximal to the upper lobe bronchus takeoff. This right middle lobe bronchial occlusion is new from February. Mitral annular calcifications with left atrial prominence, please correlate for arrhythmia. Borderline pulmonary enlargement concerning for possible pulmonary hypertension. Borderline mid ascending aortic ectasia.       ECHO:  Echo Complete  Result Date: 6/19/2024  Interpretation Summary Global and regional left ventricular function is normal with an EF of 55-60%. Right ventricular function, chamber size, wall motion, and thickness are normal. The inferior vena cava cannot be  assessed. No pericardial effusion is present. No significant valvular abnormalities present.     JONI TRIVEDI [facility]  Contact information available via [facility] Paging/Directory

## 2024-06-26 NOTE — PLAN OF CARE
Goal Outcome Evaluation:      ICU End of Shift Summary. See flowsheets for vital signs and detailed assessment.    Changes this shift: RASS -1 to +1, follows commands intermittently unsecured bilateral mitts to prevent removing LDA's. Pt in Afib intermittently with rates ~110 to 160; 5 mg metoprolol IVP given at ~0410 which resulted in pt converting to NSR, AM hgb 6.9, MD notified and said she'd defer to day team as pt does not have blood consent on file and her spouse is expected to visit in the morning. . Pressors titrated to maintain MAP>65. Vent settings: CMV 45%, RR 18, , PEEP 7. Pt's tube feeds remain at goal, insulin titrated to hourly blood sugar, 2 large soft BM's over night. Pt remains on CRRT with goal of I=O to slightly positive (up to 0.5L), pt was +155.2 mL for 24 hour period on 6/25.       Plan: Wean pressors and vent as tolerated, continue CRRT. Day team to obtain blood consent from patients spouse.

## 2024-06-26 NOTE — PROVIDER NOTIFICATION
06/26/24 0847   Weaning Assessment   Spontaneous Respiratory Rate (S)  36 breaths/min   Spontaneous Tidal Volume (mL) 313 mL   Spontaneous Minute Ventilation 9 mL   RSBI 115.02   Weaning trial discontinued due to: (S)  SaO2<92%  (O2 sats dropped to 90%)   Wean Start Time  0835   Wean End Time  0847   Wean Time Calculated (min) 12     Pt completed a 12 minute PS trial 10/ +7 50% this morning. Pt's oxygen saturations dropped to 90% and RR increased to 36. Please see flowsheets for further information.

## 2024-06-26 NOTE — PLAN OF CARE
Shift Events:  RASS =1 to -1 on precedex and fentanyl, able to follow commands, intermittent confusion but able to shake head to yes/no questions. 1unit PRBCs given for hg 6.9 recheck up to 8.8, Intermittent a.fib 70s - 130s throughout the day - oral metoprolol started, BP labile off/on pressors throughout shift, afebrile, edema +3 throughout. SBT on 10/7 for a short amount of time before becoming tachycardic & tachypneic (sats 90%), CMV settings unchanged. Multiple watery Bms - rectal tube placed, NJ w/TF at goal, aneuric on CRRT - goal I=O per renal but per MICU team requests by 0000 for pt to be -500ml. Carmelo redness on coccyx, dusky toes.    Plan: SBT tomorrow.    For complete assessments and vital signs, please refer to flowsheets.       Goal Outcome Evaluation:    Plan of Care Reviewed With: patient, spouse    Overall Patient Progress: improving    Outcome Evaluation: VS continue to be labile, on/off pressors throughout shift, pulling fluid on CRRT

## 2024-06-27 NOTE — PROGRESS NOTES
Pt placed PS 10/7; pt unable to tolerate PS     06/27/24 0811   Weaning Assessment   Weaning Assessment Complete Y   Safety Screen Weaning Assessment Weaning Assessment meets all criteria   RASS (Chavez Agitation-Sedation Scale) 0-->alert and calm   Pulse 79   BP (!) 156/104   Spontaneous Respiratory Rate 40 breaths/min   Spontaneous Tidal Volume (mL) 209 mL   Spontaneous Minute Ventilation 6.9 mL   RSBI 191.39   Weaning trial discontinued due to: RSBI>100   Wean Start Time  0811   Wean End Time  0817   Wean Time Calculated (min) 6

## 2024-06-27 NOTE — PROGRESS NOTES
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     Changes today  - Wean off pressors  - Repeat SBT this afternoon  - RUQ U/S scheduled for tomorrow (6/27) AM  - NPO at 3 AM, needs to be NPO for 4 hours prior to imaging  - Tacrolimus level pending  - Stop insulin gtt, start HDSSI  - CRRT goal net -500 mL      PLAN:    Neuro:  # Pain   - Fentanyl gtt with boluses PRN  - Acetaminophen 325mg q4H    # Sedation  - RAAS goal 0 to -1  - Precedex gtt, avoid increasing fentanyl for sedation     # Toxic metabolic encephalopathy, worsening  Presented with 2-3 days of lethargy, decreased appetite, and fatigue. Previously has improved with BiPAP/intubation. Ongoing lethargy in setting of sepsis and delirium in the setting of high-dose steroid therapy.   - Quetiapine 50mg BID and 100 mg at bedtime PRN  - Melatonin 3 mg at bedtime for agitation and improved sleep  - Wean stress dose steroids as tolerated    Pulmonary:  # Acute on chronic hypoxic hypercapnic respiratory failure  # HAP, Stenotrophomonas maltophilia, Enterococcus faecalis, and Candida guilliermondi complex  # Severe pulmonary edema  # Acute respiratory distress syndrome, resolving  Presented to the ED on 6/18/2024 with acute on chronic hypoxic hypercapnic respiratory failure. Transferred to MICU and intubated on overnight on 6/18. Workup significant for Stenotrophomonas, Enterococcus, and Candida pneumonia. Course was c/b progression to ARDS (6/21-22) with elevated plateau pressures. Pt  remain intubated d/t ongoing pulmonary edema iso ESRD on iHD. CXR (6/26) with similar pulmonary opacities compared to 6/24, left > right.     - Antibiotics, as below  - Volume management with CRRT, as below  - Mechanical ventilation, wean as able, meets extubation readiness  - Daily SBT  - Failed AM and PM SBT, both with desaturations within 6-10 min  - Pulmonary toilet  - Mucomyst BID  - Hypertonic saline BID, alternate with mucomyst  - Albuterol neb QID  - Did not tolerate volera    Vent Mode: CMV/AC  (Continuous Mandatory Ventilation/ Assist Control)  FiO2 (%): (S) 40 %  Resp Rate (Set): 18 breaths/min  Tidal Volume (Set, mL): 320 mL  PEEP (cm H2O): 7 cmH2O  Pressure Support (cm H2O): 10 cmH2O  Resp: 18    # Recurrent right middle bronchus stenosis s/p dilation and stent (6/20/2024)  Has history (bronchoscopy 2/15/24) demonstrating narrowing of right mainstem transplant anastomosis and bronchus intermedius. CT chest (6/18/2024) and bronchoscopy (6/19/2024) demonstrated occlusion of right middle bronchus. With concern for post-obstructive pneumonia, interventional pulmonology was consulted, and completed bronchoscopy (6/20/2024) with tissue debulking, right middle bronchus balloon dilation to 11 mm, stent placement in right main bronchus, and bifurcating cast placement in right upper bronchus. Plan for saline nebs BID and outpatient bronchoscopy in 6 weeks.   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  - Transplant pulm consult, appreciate recs  - Tacrolimus level (6/27) was 4.5 (low)  - PTA nebs  - Fluticasone-viilanterol (breo ellipta) every day - HOLD with respiratory infection  - Montelukast every day   - Immunosuppressants per Tx Pulm:   - PTA Tacrolimus (dosing per tx pulm)  - PTA Prednisone 5 mg daily - HOLD with  stress dose steroids  - PTA azathioprine - HOLD per Transplant pulmonology with repeat infections    Cardiovascular:  # Septic shock  On 6/19/2024, developed sepsis (hypotension 80s/50s, tachypnea 24) in the setting of stenotrophomonas pneumonia/enterococcus faecium VRE UTI. Etiology of shock likely distributive iso sepsis; less likely hypovolemic (not pulling volumes with CRRT), medication-associated (weaned off of propofol gtt) or obstructive (low suspicion for PE, echo 6/19 without evidence of cardiac dysfunction). Has been on and off pressors during course of ICU stay.    - IV pressors, wean as able   - Phenylephrine gtt (off 6/27 AM)  - Vasopressin gtt  - MAP goal >65  - Stress dose steroids  - Hydrocortisone 50mg q6H (6/21-6/26); changed to 50 mg BID (6/26-present)    - Fludrocortisone 0.1mg every day (6/21-present)    # Demand Ischemia, resolved    Presented with elevated troponin (peak 499). Not associated with chest pain or hypoxia. EKG without ST elevations/depressions or T wave inversions. Suspect demand ischemia in the setting of sepsis. Will continue to monitor symptoms and continuous telemetry.      # Hx Paroxysmal A-Fib  # Hx Pulmonary Hypertension (45 mmHg)  History of pulmonary hypertension (45 mmHg, echo 10/2023) in the setting of ILD and paroxysmal afib on PTA metoprolol tartrate BID. Not on anticoagulation for afib.   - PTA metoprolol tartrate 25mg BID     GI/Nutrition:  # Nutrition  NJT placed (6/19/2024).   - Nutrition consult  - Tube feeds 30ml/hr; at goal  - NPO at 3 AM, needs to be NPO for 4 hours prior to RUQ U/S    # Diarrhea, resolved  On 6/21, had 8 bowel movements. Occurred in the setting of scheduled bowel regimen and starting new antibiotics (meropenem, levofloxacin). C diff negative.   - Bowel regimen  - Miralax daily   - Senna BID    # C/f cholecystitis, likely acalculus  # Elevated bili  # Elevated alk phos, resolved  Admission with elevated alkaline phosphatase (237) with elevated  GGT consistent with hepatic etiology, resolved. On 6/27, pt endorsed RUQ pain with palpation and had elevated body temps (99-100F) on CRRT. LFTs and bili (6/27) was also noted have been trending upwards over the past few days.   - Monitor LFTs  - RUQ U/S scheduled for tomorrow (6/27) AM; NPO at 3AM (6/27)    Renal/Fluids/Electrolytes:  # ESRD on iHD (M,W,F)  History of ESRD 2/2 Tacrolimus toxicity on HD (MWF). With soft blood pressures, placed RIJ (6/20/2024) and started CRRT (6/20/2024).  - Nephrology consult   CRRT: aim for net negative 500ml.   - Renally-dosed medications  - RN electrolyte replacement    Endocrine:  # Risk for hyperglycemia with stress-dose steroids  Given high BG in high 200s on stress dose steroids and fludrocortisone, started on insulin gtt on 6/25. Overnight on 6/26, BG dropped to 77, pt given IV bolus of D50W and sugars corrected. Hypoglycemic episode likely iso titrating down the stress dose steroids.   - Stop insulin gtt  - Start HDSSI     ID:  # HAP, Stenotrophomonas maltophilia, Enterococcus faecalis, and Candida guilliermondi complex  Presented to the ED on 6/18/2024 with SOB and hypercapnic respiratory failure. Found on bronchoscopy (6/19) to have RML obstruction by narrowing bronchus intermedius as well as copious mucopurulent secretions/mucus plugging. Previously treated for Stenotrophonomas/aspergillus pneumonia in 11/2023 with subsequent sputum culture (2/2024) negative for both Stenotrophonomas/Aspergillus. Etiology likely repeat Stenotrophomonas infection vs opportunistic infection from colonized microbe.   - Workup  - 6/18 sputum cx: Stenotrophomonas maltophilia (ceftazidime and levofloxacin R)  - 6/19 BAL cx: Stenotrophomonas maltophilia and Enterococcus faecalis (gentamicin R)  - 6/21 BAL cx: Stenotrophomonas maltophilia and Enterococcus faecalis VRE  - 6/24 sputum cx: Stenotrophomonas maltophilia, Enterococcus faecalis VRE, and Candida guilliermondi complex  - Transplant  pulmonology consult, appreciate recs  - Transplant ID consult, appreciate recs  - Present antibiotics  - IV ceftazidime (6/25-x)  - IV minocycline (6/20-x)  - PO linezolid (6/25-x)  - IV micafungin (6/25-x)  - Past antibiotics   - S/p vancomycin (6/18-6/20)   - S/p zosyn (6/18-21)  - S/p Daptomycin (6/21-6/23)  - S/p Meropenem (6/21-6/24)  - S/p Levofloxacin (6/21-6/23)    # C/f cholecystitis, likely acalculus  # Elevated bili  # Elevated alk phos, resolved  Admission with elevated alkaline phosphatase (237) with elevated GGT consistent with hepatic etiology, resolved. On 6/27, pt endorsed RUQ pain with palpation and had elevated body temps (99-100F) on CRRT. LFTs and bili (6/27) was also noted have been trending upwards over the past few days.   - RUQ U/S scheduled for tomorrow (6/27) AM; NPO at 3AM (6/27)  - If body temp increases and/or pt endorses increased abdominal pain, low threshold to escalate infectious workup.     # Pyuria, Enterococcus faecium VRE and  ESBL E. Coli   Asymptomatic. With progressive IV pressor needs, obtained broad infectious workup which demonstrated UCx (6/19/2024) with Enterococcus faecium VR and ESBL E. Coli. S/p Daptomycin (6/21-6/23) and Meropenem (6/21-6/24).    # Hx Aspergillus PNA (11/2023)  # Hx PJP PNA (1/2021)  # Hx CMV viremia, recurrent  # Hx EBV viremia  - Transplant ID consult, appreciate recs  PTA posaconazole (Treatment for hx of aspergillus PNA)  PTA azithromycin 250mg qd (CLAD ppx)  PTA bactrim (PJP ppx)     Hematology:    # Acute on Chronic Normocytic Anemia  # Thrombocytopenia, chronic  History of chronic anemia in the setting of kidney disease (baseline Hgb 10-11). Upon admission, Hgb 9. May be bone marrow suppression in the setting of chronic illness; potential contribution by blood loss with CRRT filter changes (~150-200c). Peripheral smear (6/18/2024) without evidence of schistocytes.  - Monitor CBC    Musculoskeletal:  - PT / OT consult     Skin:  - No acute  concerns.     General Cares/Prophylaxis:    DVT Prophylaxis: Heparin SQ  GI Prophylaxis: PPI  Restraints: None  Family Communication: , will update over phone or in person  Code Status: Full Code     Lines/tubes/drains:  - PIV x2, midline  - RIJ  - A line  - NGT  - ETT    Disposition:  - Medical ICU     Patient seen and findings/plan discussed with medical ICU staff, Dr. Jean.    Ally Sadi, MS4  [employer]         Clinically Significant Risk Factors              # Hypoalbuminemia: Lowest albumin = 2.2 g/dL at 6/21/2024  4:26 PM, will monitor as appropriate   # Thrombocytopenia: Lowest platelets = 86 in last 2 days, will monitor for bleeding                 #Precipitous drop in Hgb/Hct: Lowest Hgb this hospitalization: 6.9 g/dL. Will continue to monitor and treat/transfuse as appropriate.      # Moderate Malnutrition: based on nutrition assessment    # Financial/Environmental Concerns: none              I, Edin Davis MD, was present with the medical/MARIA A student who participated in the service and in the documentation of the note.  I have verified the history and personally performed the physical exam and medical decision making.  I agree with the assessment and plan of care as documented in the note.       Edin Davis MD  Internal Medicine Resident, PGY-1    ====================================  INTERVAL HISTORY:   RN notes reviewed, MATTHEW. Did have elevated body temps with Tmax 99.2 overnight on CRRT, which continued to the AM with Tmax 100. Pt is more responsive today, able to follow 1-step and 2-step commands, and nod her head to questions. She tried to mouth words over her ETT, but unable to understand. Endorsed generalized lower chest discomfort and RUQ/epigastric pain to palpation.     OBJECTIVE:   1. VITAL SIGNS:   Temp:  [97.5  F (36.4  C)-99.2  F (37.3  C)] 98.6  F (37  C)  Pulse:  [] 75  Resp:  [18] 18  BP: (84)/(54) 84/54  MAP:  [56 mmHg-90 mmHg]  80 mmHg  Arterial Line BP: ()/(38-64) 138/53  FiO2 (%):  [40 %-50 %] 40 %  SpO2:  [92 %-100 %] 100 %  Vent Mode: CMV/AC  (Continuous Mandatory Ventilation/ Assist Control)  FiO2 (%): (S) 40 %  Resp Rate (Set): 18 breaths/min  Tidal Volume (Set, mL): 320 mL  PEEP (cm H2O): 7 cmH2O  Pressure Support (cm H2O): 10 cmH2O  Resp: 18    2. INTAKE/ OUTPUT:   I/O last 3 completed shifts:  In: 3321.7 [I.V.:1451.7; NG/GT:650]  Out: 3502 [Other:2477; Stool:1025]    3. PHYSICAL EXAMINATION:  General: Laying in bed, NAD   HEENT: Atraumatic, moist mucous membranes   Pulm/Resp: Coarse breath sounds throughout with decreased breath sounds at left base. Good air movement throughout.   CV: RRR, no m/r/g   Abdomen: Soft, non-distended. Tender to palpation in RUQ and epigastric region.   Ext: 1+ bilateral LE edema, pulses 2+ radial, pedal  Incisions/Skin: No rashes or lesions  Neuro: Awake, follows 1-step and 2-step commands, moving all extremities    4. LABS:   Arterial Blood Gases   Recent Labs   Lab 06/26/24  0310 06/25/24  1209 06/24/24  2126 06/24/24  1653   PH 7.35 7.29* 7.33* 7.30*   PCO2 44 51* 45 50*   PO2 137* 60* 133* 80   HCO3 24 24 24 25     Complete Blood Count   Recent Labs   Lab 06/27/24  0348 06/26/24  1200 06/26/24  0310 06/25/24  0348 06/24/24  0335   WBC 5.2  --  3.0* 3.0* 3.6*   HGB 8.4* 8.8* 6.9* 7.0* 7.5*   *  --  86* 70* 69*     Basic Metabolic Panel  Recent Labs   Lab 06/27/24  0704 06/27/24  0603 06/27/24  0501 06/27/24  0355 06/27/24  0348 06/26/24  1804 06/26/24  1557 06/26/24  0407 06/26/24  0310 06/25/24  1847 06/25/24  1826   NA  --   --   --   --  137  --  139  --  138  --  138   POTASSIUM  --   --   --   --  3.6  --  3.6  --  4.0  --  3.8   CHLORIDE  --   --   --   --  106  --  107  --  107  --  105   CO2  --   --   --   --  22  --  23  --  23  --  22   BUN  --   --   --   --  34.4*  --  36.6*  --  39.1*  --  38.7*   CR  --   --   --   --  0.90  --  0.90  --  0.96*  --  0.97*   * 175*  201* 224* 255*  265*   < > 122*  126*   < > 193*   < > 196*    < > = values in this interval not displayed.     Liver Function Tests  Recent Labs   Lab 06/27/24  0348 06/26/24  1557 06/26/24  0310 06/25/24  1826 06/25/24  0348 06/24/24  1610 06/24/24  0335   AST 20  --  22  --  16  --  12   ALT 38  --  38  --  28  --  18   ALKPHOS 135  --  134  --  114  --  79   BILITOTAL 1.1  --  0.8  --  0.7  --  0.7   ALBUMIN 2.4* 2.7* 2.6* 2.7* 2.4*   < > 2.4*    < > = values in this interval not displayed.     Coagulation Profile  No lab results found in last 7 days.      5. RADIOLOGY:   Recent Results (from the past 24 hour(s))   XR Chest Port 1 View    Narrative    Exam: XR CHEST PORT 1 VIEW, 6/26/2024 10:07 AM    Indication: hypoxia    Comparison: Chest x-ray 6/24/2024    Findings:     Portable frontal projection chest radiograph. Similar postsurgical  changes and support devices including sternotomy wires, right  bronchial stent, endotracheal tube, right IJ catheter, right midline  catheter and partially visualized enteric tube. Similar pulmonary  opacities, left more than right, predominantly mid and lower zone and  cardiac silhouette. No acute airspace opacity. No discernible  pneumothorax. Possible trace effusions. Low lung volumes      Impression    Impression: Stable support devices. Similar pulmonary opacities, left  more than right, compared to prior radiograph from 6/24/2024,  representing edema/infection with atelectasis.    EDIE VILLA MD         SYSTEM ID:  [id number]

## 2024-06-27 NOTE — PROGRESS NOTES
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  The patient was admitted on 24 for progressive hypoxia with dyspnea and worsening hypercapnia in ED requiring intubation likely d/t post-obstructive PNA 2/2 right bronchus stenosis.  Bronch confirming severe stenosis of right anastomosis with extensive RLL plugging.  IP bronch () with laser tissue debulking RMB stenosis, airway balloon dilation of RMB and BI, and placement of stent RMB to BI and bifurcating stent in RUL.  S/p volume resuscitation and transition to CRRT  for hypotension.  Increased agitation/delirium on  with increasing desaturation led to need for increased sedation.  Remains intubated with pulmonary edema on imaging and septic shock requiring pressors.  Also with intermittent afib since .       Today's recommendations:  - Follow pending cultures, ABX per transplant ID  - Repeat bronch in 6 weeks (from ) per IP, sooner if indicated  - Repeat Prospera and ImmuKnow in 2-4 weeks (from ) as OP  - Tacrolimus level to trend subtherapeutic, dose increased, repeat level to trend ordered   - Prednisone chronic dose on hold for stress-dose steroids per MICU  - Posaconazole chronically, micafungin 150 mg daily per transplant ID  - CMV  weekly monitoring (qTuesday) and VGCV ppx given stress dose steroids with tentative plan for 3 weeks beyond completion of stress dose steroids  - Volume removal with CRRT as able per nephrology     Septic shock:  Acute on chronic hypoxic/hypercapneic respiratory failure:  Post-obstructive multi-lobular PNA:  Right bronchial stenosis with RML collapse: Admitted with 2 weeks of progressive hypoxia, dyspnea, congested cough, and fatigue.  Chronic hypoxia with 2L NC, increased from 3 to 4L over this time with acute decompensation on day of admission associated with hypercapnia.  VBG 7/17/85 in ED s/p intubation.  IP bronch 2/15 s/p tissue debulking without stent placement.  Negative ID workup: respiratory panel, COVID, MRSA nares, PJP, Legionella, Strep pneumoniae, cocci, Histo, CrAg, fungitell x2, and A. galactomannan (blood).  IgG WNL.  Procal mildly elevated at 0.24 initially (then elevated to 1.77 6/20), LA normal, but febrile to 100.7.  TTE on admission grossly normal.  CT with RUL/RLL consolidative opacities, RML collapse, and narrowing of BI and distal RLL bronchus.  Started on norepi 6/19.  Bronch per MICU (6/19) with severe stenosis starting at right anastomosis, inspection with smaller scope revealing for extensive RLL mucous plugging.  Bronch per IP (6/20) with laser tissue debulking RMB stenosis, airway balloon dilation of RMB and BI, and placement of stent RMB to BI and bifurcating stent in RUL.  Intermittent/low grade fevers persisting, remains on full vent support, very limited PS trials.    - Bronch culture (6/19) with Steno, VSE. faecalis, Candida guilliermondii complex and filamentous fungus, following for ID  - BAL (6/21) with Steno, VRE, and E. faecalis  - Sputum culture (6/24) with Steno, VRE, and Candida guilliermondii complex  - Blood cultures (6/24) NGTD  - ABX: ceftazidime (6/25), linezolid (6/25), and minocycline (6/20, Steno) per transplant ID; s/p meropenem (6/21-6/24), daptomycin  (6/21-6/24), levofloxacin (6/21-6/23), Zosyn (6/18-6/21), azithromycin 500 mg daily (6/18-6/20), and vancomycin (6/18)   - Nebs: levalbuterol QID, Mucomyst 10% BID alternating with 3% HTS (6/22), will need to be on NS nebs BID upon discharge per IP  - Vent management per MICU  - Stress dose steroids per MICU (started 6/21, decreased 6/26)  - Repeat bronch in 6 weeks (from 6/20) per IP, sooner if indicated     S/p bilateral sequential lung transplant (BSLT) for ILD:   Progressive CLAD: Most recent OP visit in February.  DSA negative 6/19 (last positive noted 2018).  Repeat ImmuKnow (6/19) 87, very low but IST adjustment deferred per Dr. Graham given acuity and steroids (after ImmuKnow level).  Repeat Prospera remains high at 2.3 on 6/19, may be artificially elevated with current infection, but also notably high at 2.42 on 2/14.  - Repeat Prospera and ImmuKnow in 2-4 weeks (from 6/19) as OP  - PTA Singulair, azithromycin, and Breo inhaler (resume once extubated)     Immunosuppression:  On 2-drug IST since November d/t leukopenia and recurrent infections  - Tacrolimus 0.5 mg BID (increased 6/25).  Goal level 8-10.  Level to trend 6/27 subtherapeutic at 4.5, dose increased to 0.6 mg BID, repeat level to trend ordered 6/29 with management per our team.  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  - ABX and infectious work up as above     Aspergillus ochraceus:  H/o Aspergillus empyema:  S/p empyema drainage and ampho B instillation.  Previously on voriconazole, transitioned to posaconazole and managed by transplant ID as OP with last visit 3/13.  Calcified fungal elements remain with additional recurrent effusion (likely associated with fluid disturbances  "r/t iHD), but surgical intervention previously deferred d/t risk.  Posaconazole level 2.5 on 6/19.  - Posaconazole 300 mg daily chronically, micafungin 150 mg daily (6/25) per transplant ID     H/o CMV viremia: Recurrent CMV viremia historically.  VGCV ppx most recently stopped 4/12.  Recent CMV low positive at 46 (6/12), <35 (6/18), and 39 (6/25).  - CMV weekly monitoring (qTuesday) with steroid increase  - VGCV ppx (renally dosed) given stress dose steroids (6/24) with tentative plan for 3 weeks beyond completion of stress dose steroids      EBV viremia: S/p rituximab 12/13/23 x1 dose.  Most recent EBV negative 6/18.     ESRD on iHD: Kidney evaluation started as OP.  On qMWF iHD schedule, no missed sessions.  Transitioned to CRRT on 6/20, management per renal and MICU with eventual goal for fluid removal as able.      VRE urine culture: Noted 6/19, >100k GNB and VRE faecium, ABX as above with management per MICU team and transplant ID.    We appreciate the excellent care provided by the MICU team.  Recommendations communicated via in person rounding and this note.  Will continue to follow along closely, please do not hesitate to call with any questions or concerns.    Patient discussed with Dr. Graham.    Cindy Mcadams PA-C  [facility]  Pulmonary CF/Transplant     Subjective & Interval History:     Remains on full vent support, brief PS trial 10/7 this morning limited by low TV, tachypnea, and dyspnea this morning.  Minimal secretions per nursing.  Remains on 2 pressors.  CRRT continues, net positive 21 ml yesterday.  Tmax 99.2.    Review of Systems:     ROS as above otherwise significantly limited due to intubation and sedation.     Physical Exam:     All notes, images, and labs from past 24 hours (at minimum) were reviewed.    Vital signs:  Temp: 99.3  F (37.4  C) Temp src: Axillary BP: (!) 156/104 Pulse: 75   Resp: 27 SpO2: 98 % O2 Device: Mechanical Ventilator Oxygen Delivery: 6 LPM Height: 160 cm (5' 3\") " Weight: 55.8 kg (123 lb 0.3 oz)  I/O:   Intake/Output Summary (Last 24 hours) at 6/27/2024 1140  Last data filed at 6/27/2024 1100  Gross per 24 hour   Intake 2796.5 ml   Output 3426.9 ml   Net -630.4 ml     Constitutional: Lying supine in bed, in no apparent distress.   HEENT: Eyes with pink conjunctivae, anicteric.  Orally intubated via ETT.  Nasal FT.  PULM: Mildly diminished air flow bilaterally.  Crackles t/o R>L.  No rhonchi, no wheezes.  Non-labored breathing on full vent support.  CV: Normal S1 and S2.  RRR.  No murmur, gallop, or rub.  + BLE edema.  ABD: NABS, soft, nondistended.    MSK: Moves all extremities.    NEURO: Opens eyes to voice, able to answer yes/no questions by nodding/shaking head and follow some commands.  SKIN: Warm, dry, fragile.  PSYCH: Calm.     Data:     LABS    CMP:   Recent Labs   Lab 06/27/24  1046 06/27/24  0953 06/27/24  0816 06/27/24  0704 06/27/24  0355 06/27/24  0348 06/26/24  1804 06/26/24  1557 06/26/24  0407 06/26/24  0310 06/25/24  1847 06/25/24  1826 06/25/24  0350 06/25/24  0348 06/24/24  0825 06/24/24  0335   NA  --   --   --   --   --  137  --  139  --  138  --  138  --  136   < > 135   POTASSIUM  --   --   --   --   --  3.6  --  3.6  --  4.0  --  3.8  --  4.1   < > 4.1   CHLORIDE  --   --   --   --   --  106  --  107  --  107  --  105  --  105   < > 106   CO2  --   --   --   --   --  22  --  23  --  23  --  22  --  22   < > 23   ANIONGAP  --   --   --   --   --  9  --  9  --  8  --  11  --  9   < > 6*   GLC 80 128* 145* 159*   < > 255*  265*   < > 122*  126*   < > 193*   < > 196*   < > 279*   < > 207*   BUN  --   --   --   --   --  34.4*  --  36.6*  --  39.1*  --  38.7*  --  40.7*   < > 34.4*   CR  --   --   --   --   --  0.90  --  0.90  --  0.96*  --  0.97*  --  1.02*   < > 1.02*   GFRESTIMATED  --   --   --   --   --  72  --  72  --  67  --  66  --  62   < > 62   CHELSEY  --   --   --   --   --  7.8*  --  8.0*  --  7.6*  --  8.1*  --  8.0*   < > 8.1*   MAG  --   --   --  "  --   --  2.3  --  2.4*  --  2.4*  --  2.3  --  2.4*   < > 2.4*   PHOS  --   --   --   --   --  4.3  --  3.9  --  4.0  --  4.0  --  3.9   < > 3.3   PROTTOTAL  --   --   --   --   --  4.6*  --   --   --  4.6*  --   --   --  4.6*  --  4.7*   ALBUMIN  --   --   --   --   --  2.4*  --  2.7*  --  2.6*  --  2.7*  --  2.4*   < > 2.4*   BILITOTAL  --   --   --   --   --  1.1  --   --   --  0.8  --   --   --  0.7  --  0.7   ALKPHOS  --   --   --   --   --  135  --   --   --  134  --   --   --  114  --  79   AST  --   --   --   --   --  20  --   --   --  22  --   --   --  16  --  12   ALT  --   --   --   --   --  38  --   --   --  38  --   --   --  28  --  18    < > = values in this interval not displayed.     CBC:   Recent Labs   Lab 06/27/24  0348 06/26/24  1200 06/26/24  0310 06/25/24  0348 06/24/24  0335   WBC 5.2  --  3.0* 3.0* 3.6*   RBC 2.76*  --  2.28* 2.27* 2.41*   HGB 8.4* 8.8* 6.9* 7.0* 7.5*   HCT 27.1*  --  23.1* 23.0* 23.7*   MCV 98  --  101* 101* 98   MCH 30.4  --  30.3 30.8 31.1   MCHC 31.0*  --  29.9* 30.4* 31.6   RDW 17.3*  --  15.0 14.9 14.8   *  --  86* 70* 69*       INR: No lab results found in last 7 days.    Glucose:   Recent Labs   Lab 06/27/24  1046 06/27/24  0953 06/27/24  0816 06/27/24  0704 06/27/24  0603 06/27/24  0501   GLC 80 128* 145* 159* 175* 201*       Blood Gas:   Recent Labs   Lab 06/26/24  0310 06/25/24  1209 06/24/24  2126 06/23/24  1639 06/23/24  1143 06/23/24  0906   PHV  --   --   --   --  7.28* 7.28*   PCO2V  --   --   --   --  55* 53*   PO2V  --   --   --   --  35 35   HCO3V  --   --   --   --  25 25   LASHAUN  --   --   --   --  -1.6 -2.1   O2PER 50 50 60   < > 60 50  50    < > = values in this interval not displayed.       Culture Data No results for input(s): \"CULT\" in the last 168 hours.    Virology Data:   Lab Results   Component Value Date    FLUAH1 Not Detected 06/18/2024    FLUAH3 Not Detected 06/18/2024    [id number] Not Detected 06/18/2024    IFLUB Not Detected 06/18/2024 "    RSVA Not Detected 06/18/2024    RSVB Not Detected 06/18/2024    PIV1 Not Detected 06/18/2024    PIV2 Not Detected 06/18/2024    PIV3 Not Detected 06/18/2024    HMPV Not Detected 06/18/2024    HRVS Negative 01/24/2021    ADVBE Negative 01/24/2021    ADVC Negative 01/24/2021    ADVC Negative 12/23/2020    ADVC Negative 10/07/2019       Historical CMV results (last 3 of prior testing):  Lab Results   Component Value Date    CMVQNT <35 (A) 06/18/2024    CMVQNT Not Detected 03/05/2024    CMVQNT Not Detected 12/19/2023     Lab Results   Component Value Date    CMVLOG 1.6 06/25/2024    CMVLOG <1.5 06/18/2024    CMVLOG 2.5 02/14/2024       Urine Studies    Recent Labs   Lab Test 06/19/24  1214 01/24/21  1729   URINEPH 6.5 5.0   NITRITE Negative Negative   LEUKEST Large* Moderate*   WBCU >182* 34*       Most Recent [place]ze Pulmonary Function Testing (FVC/FEV1 only)  FVC-Pre   Date Value Ref Range Status   02/14/2024 0.85 L    12/19/2023 1.04 L    11/21/2023 0.85 L    10/24/2023 0.96 L      FVC-%Pred-Pre   Date Value Ref Range Status   02/14/2024 29 %    12/19/2023 36 %    11/21/2023 29 %    10/24/2023 33 %      FEV1-Pre   Date Value Ref Range Status   02/14/2024 0.80 L    12/19/2023 0.89 L    11/21/2023 0.78 L    10/24/2023 0.87 L      FEV1-%Pred-Pre   Date Value Ref Range Status   02/14/2024 34 %    12/19/2023 38 %    11/21/2023 33 %    10/24/2023 37 %        IMAGING    Recent Results (from the past 48 hour(s))   XR Chest Port 1 View    Narrative    Exam: XR CHEST PORT 1 VIEW, 6/26/2024 10:07 AM    Indication: hypoxia    Comparison: Chest x-ray 6/24/2024    Findings:     Portable frontal projection chest radiograph. Similar postsurgical  changes and support devices including sternotomy wires, right  bronchial stent, endotracheal tube, right IJ catheter, right midline  catheter and partially visualized enteric tube. Similar pulmonary  opacities, left more than right, predominantly mid and lower zone and  cardiac  silhouette. No acute airspace opacity. No discernible  pneumothorax. Possible trace effusions. Low lung volumes      Impression    Impression: Stable support devices. Similar pulmonary opacities, left  more than right, compared to prior radiograph from 6/24/2024,  representing edema/infection with atelectasis.    EDIE VILLA MD         SYSTEM ID:  [id number]

## 2024-06-27 NOTE — PLAN OF CARE
ICU End of Shift Summary. See flowsheets for vital signs and detailed assessment.    Changes this shift: Rass +1/-1, following simple commands and moves all extremities. Precedex @1 and Fentanyl @100. Pt in and out of afib but mostly stayed in sinus rhythm with occasional PAC during the night. HR ranged btn 70s and 120s. Pt with labile Bps and on two pressors Vaso straight rate @ 1.8 and Mehul titrated to maintain MAP>65. Low grade fever during the night with Tmax 99.2. Pt on TF and insulin gtt. BG dropped down to the lowest @ 64, asymptomatic, d50 administered, corrected, and now on algorithm 1. CMV 40/320/18/7, Coarse LS with minimal secretions. NJT with TF @ goal, Moderate loose stools per rectal tube and pt remains anuric. CRRT meeting goal I=O.Mitts restraints on for safety.    Plan: PST, continue managing CRRT and current POC and report to MICU with any changes.     Goal Outcome Evaluation:      Plan of Care Reviewed With: patient    Overall Patient Progress: no changeOverall Patient Progress: no change    Outcome Evaluation: Labile BPs,on two pressors

## 2024-06-27 NOTE — PROGRESS NOTES
CRRT STATUS NOTE    DATA:  Time:  1720  Pressures WNL:  YES  Filter Status:  WDL    Problems Reported/Alarms Noted:  none during shift    Supplies Present:  YES    ASSESSMENT:  Patient Net Fluid Balance:  -459ml since midnight    Vital Signs:  Temp: 99.4  F (37.4  C) Temp src: Axillary BP: (!) 156/104 Pulse: 89   Resp: 19 SpO2: 100 % O2 Device: Mechanical Ventilator            Labs:   Recent Labs   Lab 06/27/24  1617 06/27/24  1219 06/27/24  1046 06/27/24  0355 06/27/24  0348 06/26/24  1804 06/26/24  1557 06/26/24  1225 06/26/24  1200 06/26/24  0407 06/26/24  0310 06/25/24  0350 06/25/24  0348   WBC  --   --   --   --  5.2  --   --   --   --   --  3.0*  --  3.0*   HGB  --   --   --   --  8.4*  --   --   --  8.8*  --  6.9*  --  7.0*   MCV  --   --   --   --  98  --   --   --   --   --  101*  --  101*   PLT  --   --   --   --  112*  --   --   --   --   --  86*  --  70*   NA  --   --   --   --  137  --  139  --   --   --  138   < > 136   POTASSIUM  --   --   --   --  3.6  --  3.6  --   --   --  4.0   < > 4.1   CHLORIDE  --   --   --   --  106  --  107  --   --   --  107   < > 105   CO2  --   --   --   --  22  --  23  --   --   --  23   < > 22   BUN  --   --   --   --  34.4*  --  36.6*  --   --   --  39.1*   < > 40.7*   CR  --   --   --   --  0.90  --  0.90  --   --   --  0.96*   < > 1.02*   ANIONGAP  --   --   --   --  9  --  9  --   --   --  8   < > 9   CHELSEY  --   --   --   --  7.8*  --  8.0*  --   --   --  7.6*   < > 8.0*   * 186* 80   < > 255*  265*   < > 122*  126*   < >  --    < > 193*   < > 279*   ALBUMIN  --   --   --   --  2.4*  --  2.7*  --   --   --  2.6*   < > 2.4*   PROTTOTAL  --   --   --   --  4.6*  --   --   --   --   --  4.6*  --  4.6*   BILITOTAL  --   --   --   --  1.1  --   --   --   --   --  0.8  --  0.7   ALKPHOS  --   --   --   --  135  --   --   --   --   --  134  --  114   ALT  --   --   --   --  38  --   --   --   --   --  38  --  28   AST  --   --   --   --  20  --   --   --   --   --   22  --  16    < > = values in this interval not displayed.                  Goals of Therapy:  (goal 0 - 50ml/hr, goal 0.5-1L negative next 24 hrs) achieving goal    INTERVENTIONS:   None needed during shift    PLAN:  Continue CRRT as per MD order. Notify CRRT Resource RN with any questions, comments, or concerns.

## 2024-06-27 NOTE — PROGRESS NOTES
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 Nephrology consulted for management of HD while admitted.      Interval History :   Mrs Blue continues on CRRT, was net even yesterday, discussed with team and will try to be gently negative today, still ~3kg above EDW and on moderate dose of norepi.  Has been in NSR with reasonable HR's the past ~24h since starting some metoprolol.     Assessment & Recommendations:   ESRD-ESKD: pt dialyzes MWF at [facility] (ph [phone], f [phone]) with Dr. Glasgow. Run time: 3.5 hrs. EDW 52.5 kg. Access: L AVF. +heparin 1,500u bolus.                  -Appreciate team placing tri-alysis line to run norepi and will plan for CRRT, 4k baths, planning 0-50cc/h net negative, 500u heparin/h through circuit.                -No need for new dialysis consent, continuing long term HD for ESRD.                -L AVF with remote hx of needing intervention, no issue recently.      Outpt Rx:       Volume-Above EDW by ~5kg but may have some variability with scales, ordered for I=O to slightly positive.      Pulm-Admitted with hypercapnic resp failure, suspected PNA. ID involved, getting bronch.  Currently on Zosyn, bactrim, Posaconazole, Azithromycin and Vanco x1 dose so far.       Electrolytes-Stable on CRRT, 4k baths, checking labs BID.      BMD-Stable on CRRT, has replacements ordered with CRRT order set.      Anemia-Hgb ~7 and drifting, on venofer 50mg weekly but will hold while treating for infection. On Mircera 60mcg [id number], will cover with short term Epo while admitted when stable enough for HD.      "  Nutrition-Novasource renal started      Time spent: 50 minutes on this date of encounter for chart review, physical exam, medical decision making and co-ordination of care.      Seen and discussed with Dr Vazquez     Recommendations were communicated to primary team via verbal communication.     ADRIÁN Lo CNS  Clinical Nurse Specialist  [phone]    Review of Systems:   I reviewed the following systems:  ROS not done due to vent/sedation    Physical Exam:   I/O last 3 completed shifts:  In: 3321.7 [I.V.:1451.7; NG/GT:650]  Out: 3502 [Other:2477; Stool:1025]   BP (!) 156/104   Pulse 75   Temp 99.3  F (37.4  C) (Axillary)   Resp 27   Ht 1.6 m (5' 3\")   Wt 55.8 kg (123 lb 0.3 oz)   LMP 06/07/2014 (Exact Date)   SpO2 98%   BMI 21.79 kg/m       GENERAL APPEARANCE: Intubated but alert.    EYES: No scleral icterus  Pulmonary: lungs Rhonchi to auscultation with equal breath sounds bilaterally  CV: Regular rhythm, normal rate   - Edema none  GI: soft, nontender, normal bowel sounds  MS: no evidence of inflammation in joints, no muscle tenderness  : No Basilio  SKIN: no rash, warm, dry  NEURO: No focal deficits    Labs:   All labs reviewed by me  Electrolytes/Renal -   Recent Labs   Lab Test 06/27/24  1046 06/27/24  0953 06/27/24  0816 06/27/24  0355 06/27/24  0348 06/26/24  1804 06/26/24  1557 06/26/24  0407 06/26/24  0310   NA  --   --   --   --  137  --  139  --  138   POTASSIUM  --   --   --   --  3.6  --  3.6  --  4.0   CHLORIDE  --   --   --   --  106  --  107  --  107   CO2  --   --   --   --  22  --  23  --  23   BUN  --   --   --   --  34.4*  --  36.6*  --  39.1*   CR  --   --   --   --  0.90  --  0.90  --  0.96*   GLC 80 128* 145*   < > 255*  265*   < > 122*  126*   < > 193*   CHELSEY  --   --   --   --  7.8*  --  8.0*  --  7.6*   MAG  --   --   --   --  2.3  --  2.4*  --  2.4*   PHOS  --   --   --   --  4.3  --  3.9  --  4.0    < > = values in this interval not displayed.       CBC -   Recent " Labs   Lab Test 06/27/24  0348 06/26/24  1200 06/26/24  0310 06/25/24  0348   WBC 5.2  --  3.0* 3.0*   HGB 8.4* 8.8* 6.9* 7.0*   *  --  86* 70*       LFTs -   Recent Labs   Lab Test 06/27/24  0348 06/26/24  1557 06/26/24  0310 06/25/24  1826 06/25/24  0348   ALKPHOS 135  --  134  --  114   BILITOTAL 1.1  --  0.8  --  0.7   ALT 38  --  38  --  28   AST 20  --  22  --  16   PROTTOTAL 4.6*  --  4.6*  --  4.6*   ALBUMIN 2.4* 2.7* 2.6*   < > 2.4*    < > = values in this interval not displayed.       Iron Panel -   Recent Labs   Lab Test 02/03/21  0415 12/13/18  1033 08/01/18  0921   IRON 51 16* 93   IRONSAT 36 7* 37   YLOA  --  302* 571*           Current Medications:  Current Facility-Administered Medications   Medication Dose Route Frequency Provider Last Rate Last Admin    acetylcysteine (MUCOMYST) 10 % nebulizer solution 4 mL  4 mL Inhalation BID Velez Reyes, German, MD   4 mL at 06/27/24 0824    azithromycin (ZITHROMAX) tablet 250 mg  250 mg Oral or Feeding Tube Daily Velez Reyes, German, MD   250 mg at 06/27/24 0801    B and C vitamin Complex with folic acid (NEPHRONEX) liquid 5 mL  5 mL Per Feeding Tube Daily Awa Watt MD   5 mL at 06/27/24 0801    calcium carbonate (TUMS) chewable tablet 500 mg  500 mg Oral Once per day on Sunday Tuesday Thursday Saturday Josesito Pratt MD   500 mg at 06/27/24 0847    cefTAZidime (FORTAZ) 2 g vial to attach to  ml bag for ADULTS or NS 50 ml bag for PEDS  2 g Intravenous Q8H Chio Cortez MD   2 g at 06/27/24 0420    chlorhexidine (PERIDEX) 0.12 % solution 15 mL  15 mL Mouth/Throat Q12H Chio Cortez MD   15 mL at 06/27/24 0801    epoetin alisha-epbx (RETACRIT) injection 4,000 Units  4,000 Units Intravenous Once per day on Monday Wednesday Friday Velez Reyes, German, MD   4,000 Units at 06/26/24 1745    fludrocortisone (FLORINEF) tablet 0.1 mg  0.1 mg Oral or Feeding Tube Daily Evelyn Roberts, APRN CNP   0.1 mg at 06/27/24 0801     hydrocortisone sodium succinate PF (solu-CORTEF) injection 50 mg  50 mg Intravenous BID Velez Reyes, German, MD   50 mg at 06/27/24 0801    insulin aspart (NovoLOG) injection (RAPID ACTING)  1-12 Units Subcutaneous Q4H Edin Davis MD        levalbuterol (XOPENEX) neb solution 0.63 mg  0.63 mg Nebulization 4x Daily Gareth Mosquera MD   0.63 mg at 06/27/24 0824    linezolid (ZYVOX) tablet 600 mg  600 mg Oral or Feeding Tube Q12H Velez Reyes, German, MD   600 mg at 06/26/24 2349    [Held by provider] magnesium chloride CR tablet 1,070 mg  1,070 mg Oral Once per day on Sunday Tuesday Thursday Saturday Josesito Pratt MD        melatonin tablet 3 mg  3 mg Oral or Feeding Tube At Bedtime Velez Reyes, German, MD   3 mg at 06/26/24 2018    metoprolol tartrate (LOPRESSOR) tablet 25 mg  25 mg Oral BID Velez Reyes, German, MD   25 mg at 06/27/24 0801    micafungin (MYCAMINE) 150 mg in sodium chloride 0.9 % 100 mL intermittent infusion  150 mg Intravenous Q24H Chio Cortez  mL/hr at 06/27/24 0018 150 mg at 06/27/24 0018    minocycline (MINOCIN) 100 mg in sodium chloride 0.9 % 100 mL intermittent infusion  100 mg Intravenous Q12H Kajal Chong APRN  mL/hr at 06/26/24 2308 100 mg at 06/26/24 2308    montelukast (SINGULAIR) tablet 10 mg  10 mg Oral At Bedtime Velez Reyes, German, MD   10 mg at 06/26/24 2153    pantoprazole (PROTONIX) 2 mg/mL suspension 40 mg  40 mg Per Feeding Tube Daily Josesito Pratt MD   40 mg at 06/26/24 1138    polyethylene glycol (MIRALAX) Packet 17 g  17 g Oral or Feeding Tube Daily Velez Reyes, German, MD   17 g at 06/25/24 1158    posaconazole (NOXAFIL) DR tablet TBEC 300 mg  300 mg Per Feeding Tube Daily Josesito Pratt MD   300 mg at 06/26/24 1139    [Held by provider] predniSONE (DELTASONE) tablet 5 mg  5 mg Oral Daily Velez Reyes, German, MD   5 mg at 06/21/24 0822    protein modular (PROSOURCE TF20) packet 1 packet  1 packet Per Feeding Tube BID Shukri  MD Awa   1 packet at 06/27/24 0802    QUEtiapine (SEROquel) tablet 150 mg  150 mg Oral or Feeding Tube At Bedtime Velez Reyes, German, MD   150 mg at 06/26/24 2153    sodium chloride (NEBUSAL) 3 % neb solution 3 mL  3 mL Nebulization BID Velez Reyes, German, MD   3 mL at 06/26/24 2123    sodium chloride (PF) 0.9% PF flush 10 mL  10 mL Intracatheter Q8H Kajal Chong APRN CNP   10 mL at 06/26/24 0741    sodium chloride (PF) 0.9% PF flush 10 mL  10 mL Intracatheter Q8H Kajal Chong, APRN CNP   10 mL at 06/26/24 0740    sulfamethoxazole-trimethoprim (BACTRIM) 400-80 MG per tablet 1 tablet  1 tablet Oral or Feeding Tube Daily Josesito Pratt MD   1 tablet at 06/26/24 2018    tacrolimus (GENERIC) suspension 0.6 mg  0.6 mg Oral or Feeding Tube BID IS Cindy Mcadams PA-C        valGANciclovir (VALCYTE) solution 450 mg  450 mg Per Feeding Tube Daily Rufina Huff, CNP   450 mg at 06/27/24 0801    Vitamin D3 (CHOLECALCIFEROL) tablet 50 mcg  50 mcg Oral Once per day on Monday Wednesday Friday Velez Reyes, German, MD   50 mcg at 06/26/24 1009     Current Facility-Administered Medications   Medication Dose Route Frequency Provider Last Rate Last Admin    dexmedeTOMIDine (PRECEDEX) 4 mcg/mL in sodium chloride 0.9 % 100 mL infusion  0.1-1.2 mcg/kg/hr (Dosing Weight) Intravenous Continuous Chio Cortez MD 12.7 mL/hr at 06/27/24 1100 1 mcg/kg/hr at 06/27/24 1100    dextrose 10% infusion   Intravenous Continuous PRN Velez Reyes, German, MD        dextrose 10% infusion   Intravenous Continuous PRN Awa Watt MD        dialysate for CVVHD & CVVHDF (Phoxillum BK4/2.5)  12.5 mL/kg/hr CRRT Continuous Jonna Rodas  mL/hr at 06/27/24 0911 12.5 mL/kg/hr at 06/27/24 0911    fentaNYL (SUBLIMAZE) infusion   mcg/hr Intravenous Continuous Velez Reyes, German, MD 1.5 mL/hr at 06/27/24 1100 75 mcg/hr at 06/27/24 1100    heparin (porcine) 20,000 units in 20 mL ANTICOAGULANT infusion (syringe from  pharmacy)  500 Units/hr CRRT Continuous Hardy Tran APRN CNS 0.5 mL/hr at 06/27/24 1100 500 Units/hr at 06/27/24 1100    phenylephrine (DANDY-SYNEPHRINE) 50 mg in NaCl 0.9 % 250 mL infusion  0.1-6 mcg/kg/min (Dosing Weight) Intravenous Continuous Chio Cortez MD   Stopped at 06/27/24 1041    POST-filter replacement solution for CVVHD & CVVHDF (Phoxillum BK4/2.5)   CRRT Continuous Jonna Rodas  mL/hr at 06/26/24 1403 New Bag at 06/26/24 1403    PRE-filter replacement solution for CVVHD & CVVHDF (Phoxillum BK4/2.5)  12.5 mL/kg/hr CRRT Continuous Jonna Rodas  mL/hr at 06/27/24 0911 12.5 mL/kg/hr at 06/27/24 0911    vasopressin 1 unit/mL MAX Conc (PITRESSIN) infusion  1.8 Units/hr Intravenous Continuous Velez Reyes, German, MD 1.8 mL/hr at 06/27/24 1100 1.8 Units/hr at 06/27/24 1100

## 2024-06-27 NOTE — PROGRESS NOTES
Pt attempted multiple PS trials today with settings of 10ps and +7peep. All failed within 10mins.     06/27/24 1242   Weaning Assessment   Weaning Assessment Complete Y   Safety Screen Weaning Assessment ARWY Unstable/unsafe ariway (usualy a post surgery problem)   RASS (Chavez Agitation-Sedation Scale) 0-->alert and calm   Pulse 84   Spontaneous Respiratory Rate 29 breaths/min   Spontaneous Tidal Volume (mL) 131 mL   Spontaneous Minute Ventilation 3.5 mL   RSBI 221.37   Weaning trial discontinued due to: (S)  SaO2<92%  (pt deSAT to 78% after 10m of trial)   Wean Start Time  1242   Wean End Time  1252   Wean Time Calculated (min) 10

## 2024-06-27 NOTE — PLAN OF CARE
Goal Outcome Evaluation:      Plan of Care Reviewed With: patient, spouse    Overall Patient Progress: no change    Outcome Evaluation: Failed PST X3, but down to 1 pressor and pulling on CRRT.    Neuro: Labile, pt is alert to lethargic, able to write this morning and make needs known, to more lethargic this afternoon/evening but arousable and still able to follow commands. PERRL. Moves all extremities.    Cardiac: HR is SR with intermittent AFIB throughtout the day, MAP >65 on Vaso straight rate, Phenyl turned off around 11am. Vaso able to be off ~15 mins then restarted for MAPs in 50s. TMAX 100 (on CRRT), MICU aware, trending temps. Finger/toes dusky, but unchanged.     Resp: Failed PST x3 of 10/7 for tachypnea, low TV, and desat (down to 60% the 3rd attempt. Pt requested the 3rd PST attempt this afternoon).     GI: TF at goal, plan to be NPO at 0300 for ABD U/S in am. Stool via rectal tube.    : Anuric, on CRRT.     Skin: No acute changes.     IV: RIJ CRRT and R Fem arterial line dressing changed this shift by CLT RN.     Drips: Fent weaned from 100 to 75, Dex at 1, insulin gtt transitioned to sliding scale due to hypoglycemia.     CRRT: Order changed to pull 0-50mL/hr on CRRT, so far net negative -500mL for the day and down to 1 pressor still.     Plan: Spouse updated at bedside, Notify MICU team of any changes.

## 2024-06-27 NOTE — PROGRESS NOTES
CRRT STATUS NOTE    DATA:  Time:  6:42 AM  Pressures WNL:  YES  Filter Status:  WDL    Problems Reported/Alarms Noted:  none    Supplies Present:  YES    ASSESSMENT:  Patient Net Fluid Balance:  6/26: +21 mL; 6/27: -63 mL since midnight  Vital Signs:  Temp:  [97.5  F (36.4  C)-99.2  F (37.3  C)] 98.6  F (37  C)  Pulse:  [] 90  Resp:  [18] 18  BP: (84)/(54) 84/54  MAP:  [56 mmHg-90 mmHg] 71 mmHg  Arterial Line BP: ()/(38-64) 132/48  FiO2 (%):  [45 %-50 %] 45 %  SpO2:  [92 %-100 %] 100 %  Labs:  K 3.6, Mg 2.3, Phos 4.3, iCa 4.7, Crt 0.9  Goals of Therapy:  I=O to slightly positive (up to 0.5 L)    INTERVENTIONS:   none    PLAN:  Continue fluid removal as tolerated per goals of therapy. Check circuit daily and change circuit q72h and prn. Please contact CRRT resource RN with any questions/concerns.

## 2024-06-28 NOTE — PROGRESS NOTES
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     Changes today  - Sedation vacation as tolerated  - SBT trial in AM and PM  - RUQ U/S c/w cholelithiasis with c/f cholecystitis vs choledocholithiasis  - Consult GI  - CRRT goal net -500 mL today, then I=O tomorrow  - Decrease fludrocortisone to 0.05 from 0.1 mg  - Decrease hydrocortisone to 25 BID  - Restart tube feeds  - Continue to wean off pressors as tolerated  - Lipase pending      PLAN:    Neuro:  # Pain   - Acetaminophen 325mg q4H  - Fentanyl gtt with boluses PRN    # Sedation  - RAAS goal 0 to -1  - Precedex gtt - wean as able  - Daily sedation vacation    # Toxic metabolic encephalopathy, improving  Presented with 2-3 days of lethargy, decreased appetite, and fatigue. Previously has improved with BiPAP/intubation. Ongoing lethargy in setting of sepsis and delirium in the setting of high-dose steroid therapy. Has been hallucinating, reportedly seeing snakes (6/28). Plan to wean down on precedex gtt (see above).  - Quetiapine 150 mg at bedtime PRN  - Melatonin 3 mg at bedtime for agitation and improved sleep  - Wean stress dose steroids as tolerated    Pulmonary:  # Acute on chronic hypoxic hypercapnic respiratory failure  # HAP, Stenotrophomonas maltophilia, Enterococcus faecalis, and Candida guilliermondi complex  # Severe pulmonary edema  # Acute respiratory distress syndrome, resolving  Presented to the ED on 6/18/2024 with acute on chronic hypoxic hypercapnic respiratory  failure. Transferred to MICU and intubated on overnight on 6/18. Workup significant for Stenotrophomonas, Enterococcus, and Candida pneumonia. Course was c/b progression to ARDS (6/21-22) with elevated plateau pressures. Pt remain intubated d/t ongoing pulmonary edema iso ESRD on iHD. CXR (6/26) with similar pulmonary opacities compared to 6/24, left > right.    - Antibiotics, as below  - Volume management with CRRT, as below  - Mechanical ventilation, wean as able, meets extubation readiness  - Daily SBT (AM and PM)  - Pulmonary toilet  - Mucomyst BID  - Hypertonic saline BID, alternate with mucomyst  - Albuterol neb QID  - Did not tolerate volera    Vent Mode: CPAP/PS  (Continuous positive airway pressure with Pressure Support)  FiO2 (%): 45 %  Resp Rate (Set): 18 breaths/min  Tidal Volume (Set, mL): 320 mL  PEEP (cm H2O): 7 cmH2O  Pressure Support (cm H2O): 10 cmH2O  Resp: 24    # Recurrent right middle bronchus stenosis s/p dilation and stent (6/20/2024)  Has history (bronchoscopy 2/15/24) demonstrating narrowing of right mainstem transplant anastomosis and bronchus intermedius. CT chest (6/18/2024) and bronchoscopy (6/19/2024) demonstrated occlusion of right middle bronchus. With concern for post-obstructive pneumonia, interventional pulmonology was consulted, and completed bronchoscopy (6/20/2024) with tissue debulking, right middle bronchus balloon dilation to 11 mm, stent placement in right main bronchus, and bifurcating cast placement in right upper bronchus. Plan for saline nebs BID and outpatient bronchoscopy in 6 weeks.   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  - Transplant pulm consult, appreciate recs  - Tacrolimus level (6/27) was 4.5 (low)  - PTA nebs  - Fluticasone-viilanterol (breo ellipta) every day -  HOLD with respiratory infection  - Montelukast every day   - Immunosuppressants per Tx Pulm:   - PTA Tacrolimus (dosing per tx pulm)  - PTA Prednisone 5 mg daily - HOLD with stress dose steroids  - PTA azathioprine - HOLD per Transplant pulmonology with repeat infections    Cardiovascular:  # Septic shock  On 6/19/2024, developed sepsis (hypotension 80s/50s, tachypnea 24) in the setting of stenotrophomonas pneumonia/enterococcus faecium VRE UTI. Etiology of shock likely distributive iso sepsis; less likely hypovolemic (not pulling volumes with CRRT), medication-associated (weaned off of propofol gtt) or obstructive (low suspicion for PE, echo 6/19 without evidence of cardiac dysfunction). Has been on and off pressors during course of ICU stay.    - IV pressors, wean as able   - Phenylephrine gtt (off 6/27 AM)  - Vasopressin gtt  - MAP goal >65  - Stress dose steroids  - Hydrocortisone 50mg q6H (6/21-6/26); 50 mg BID (6/26-6/28); 25 mg BID (6/28-x)  - Fludrocortisone 0.1 mg qday (6/21-6/28); 0.05 mg qday (6/28-x)  - If hemodynamically stable, consider transitioning to PTA prednisone at increased 40 mg daily (then titrate to 5 mg)    # Demand Ischemia, resolved    Presented with elevated troponin (peak 499). Not associated with chest pain or hypoxia. EKG without ST elevations/depressions or T wave inversions. Suspect demand ischemia in the setting of sepsis. Will continue to monitor symptoms and continuous telemetry.      # Hx Paroxysmal A-Fib  # Hx Pulmonary Hypertension (45 mmHg)  History of pulmonary hypertension (45 mmHg, echo 10/2023) in the setting of ILD and paroxysmal afib on PTA metoprolol tartrate BID. Not on anticoagulation for afib.   - PTA metoprolol tartrate 25mg BID     GI/Nutrition:  # Nutrition  NJT placed (6/19/2024).   - Nutrition consult  - Tube feeds 30ml/hr; at goal    # Diarrhea, resolved  On 6/21, had 8 bowel movements. Occurred in the setting of scheduled bowel regimen and starting new  antibiotics (meropenem, levofloxacin). C diff negative.   - Bowel regimen  - Miralax daily PRN   - Senna BID    # C/f cholecystitis vs choledocholithiasis/distal biliary obstruction  # Cholelithiasis with gallbladder wall thickening  # Elevated bili  # Elevated alk phos, resolved  Admission with elevated alkaline phosphatase (237) with elevated GGT consistent with hepatic etiology, resolved. On 6/27, pt endorsed RUQ pain with palpation and had elevated body temps (99-100F) on CRRT. LFTs and bili (6/27) was also noted have been trending upwards over the past few days. Labs on 6/28 improved, but RUQ U/S (6/28) showed gallbladder wall thickening measuring up to 6 mm with shadowing gallstones present and pericholecystic fluid; common bile duct dilated to 11 mm. Findings c/w cholelithiasis with c/f cholecystitis vs choledocholithiasis/distal biliary obstruction.   - Monitor LFTs  - Consult GI, appreciate recs  - Lipase pending    Renal/Fluids/Electrolytes:  # ESRD on iHD (M,W,F)  History of ESRD 2/2 Tacrolimus toxicity on HD (MWF). With soft blood pressures, placed RIJ (6/20/2024) and started CRRT (6/20/2024).  - Nephrology consult  - CRRT: aim for net negative 500ml  - Discussed with nephrology about transitioning to goal of I=O on Sat (6/29)  - Renally-dosed medications  - RN electrolyte replacement    Endocrine:  # Risk for hyperglycemia with stress-dose steroids  Given high BG in high 200s on stress dose steroids and fludrocortisone, started on insulin gtt on 6/25. Overnight on 6/26, BG dropped to 77, pt given IV bolus of D50W and sugars corrected. Hypoglycemic episode likely iso titrating down the stress dose steroids. S/p insulin gtt (6/25-27).   - HDSSI     ID:  # HAP, Stenotrophomonas maltophilia, Enterococcus faecalis, Aspergillus, and Candida guilliermondi complex  Presented to the ED on 6/18/2024 with SOB and hypercapnic respiratory failure. Found on bronchoscopy (6/19) to have RML obstruction by narrowing  bronchus intermedius as well as copious mucopurulent secretions/mucus plugging. Previously treated for Stenotrophonomas/aspergillus pneumonia in 11/2023 with subsequent sputum culture (2/2024) negative for both Stenotrophonomas/Aspergillus. Etiology likely repeat Stenotrophomonas infection vs opportunistic infection from colonized microbe.   - Workup  - 6/18 sputum cx: Stenotrophomonas maltophilia (ceftazidime and levofloxacin R)  - 6/19 BAL cx: Stenotrophomonas maltophilia, Enterococcus faecalis (gentamicin R), Aspergillus (speciation in process)  - 6/21 BAL cx: Stenotrophomonas maltophilia and Enterococcus faecalis VRE  - 6/24 sputum cx: Stenotrophomonas maltophilia, Enterococcus faecalis VRE, and Candida guilliermondi complex  - Transplant pulmonology consult, appreciate recs  - Transplant ID consult, appreciate recs  - Present antibiotics  - IV minocycline (6/20-x)  - PO linezolid (6/25-x)  - IV micafungin (6/25-x) - until Aspergillus speciation results  - Past antibiotics  - S/p ceftazidime (6/25-6/28)    - S/p vancomycin (6/18-6/20)   - S/p zosyn (6/18-21)  - S/p Daptomycin (6/21-6/23)  - S/p Meropenem (6/21-6/24)  - S/p Levofloxacin (6/21-6/23)    # Pyuria, Enterococcus faecium VRE and  ESBL E. Coli   Asymptomatic. With progressive IV pressor needs, obtained broad infectious workup which demonstrated UCx (6/19/2024) with Enterococcus faecium VR and ESBL E. Coli. S/p Daptomycin (6/21-6/23) and Meropenem (6/21-6/24).    # Hx Aspergillus PNA (11/2023)  # Hx PJP PNA (1/2021)  # Hx CMV viremia, recurrent  # Hx EBV viremia  - Transplant ID consult, appreciate recs  PTA posaconazole (Treatment for hx of aspergillus PNA)  PTA azithromycin 250mg qd (CLAD ppx)  PTA bactrim (PJP ppx)     Hematology:    # Acute on Chronic Normocytic Anemia  # Thrombocytopenia, chronic  History of chronic anemia in the setting of kidney disease (baseline Hgb 10-11). Upon admission, Hgb 9. May be bone marrow suppression in the setting of  chronic illness; potential contribution by blood loss with CRRT filter changes (~150-200c). Peripheral smear (6/18/2024) without evidence of schistocytes.  - Monitor CBC    Musculoskeletal:  - PT / OT consult     Skin:  - No acute concerns.     General Cares/Prophylaxis:    DVT Prophylaxis: Heparin SQ  GI Prophylaxis: PPI  Restraints: None  Family Communication: , will update over phone or in person  Code Status: Full Code     Lines/tubes/drains:  - PIV x2, midline  - RIJ  - A line  - NGT  - ETT    Disposition:  - Medical ICU     Patient seen and findings/plan discussed with medical ICU staff, Dr. Jean.    Ally Avitia, MS4  [facility] [employer]         Clinically Significant Risk Factors              # Hypoalbuminemia: Lowest albumin = 2.2 g/dL at 6/21/2024  4:26 PM, will monitor as appropriate   # Thrombocytopenia: Lowest platelets = 98 in last 2 days, will monitor for bleeding                 #Precipitous drop in Hgb/Hct: Lowest Hgb this hospitalization: 6.9 g/dL. Will continue to monitor and treat/transfuse as appropriate.      # Moderate Malnutrition: based on nutrition assessment    # Financial/Environmental Concerns: none              Resident/Fellow Attestation   I, German Velez Reyes, MD, PhD was present with the medical/MARIA A student who participated in the service and in the documentation of the note.  I have verified the history and personally performed the physical exam and medical decision making.  I agree with the assessment and plan of care as documented in the note.      Hemodynamics changing during the day with on and off pressors depending on level of activity and therapies. SBT and PST today. High RSBI index and tenous hemodynamics during, for which were terminated. Transplant Pulm following closely and managing IS.     RUQ due to RUQ pain. Noted CBD dilation. GI consulted.     Germán L. Vélez Reyes, MD, PhD  Internal Medicine Resident     Date of Service (when I saw the  patient): 06/28/24        ====================================  INTERVAL HISTORY:   RN notes reviewed, pt had difficulty falling asleep overnight with some increased agitation requiring fentanyl bolus. Reportedly hallucinating, seeing rattlesnakes in the room. This AM, pt is more alert and oriented. Able to follow commands and answer questions. Writing out legible words to communicate, but difficult to interpret the meaning of the words. SBT trial this AM, increased RR and low TV. Pt denies discomfort or pain.       OBJECTIVE:   1. VITAL SIGNS:   Temp:  [98.3  F (36.8  C)-99.4  F (37.4  C)] 98.7  F (37.1  C)  Pulse:  [] 81  Resp:  [18-62] 24  MAP:  [59 mmHg-106 mmHg] 78 mmHg  Arterial Line BP: ()/(39-69) 127/54  FiO2 (%):  [40 %-45 %] 45 %  SpO2:  [73 %-100 %] 96 %  Vent Mode: CPAP/PS  (Continuous positive airway pressure with Pressure Support)  FiO2 (%): 45 %  Resp Rate (Set): 18 breaths/min  Tidal Volume (Set, mL): 320 mL  PEEP (cm H2O): 7 cmH2O  Pressure Support (cm H2O): 10 cmH2O  Resp: 24    2. INTAKE/ OUTPUT:   I/O last 3 completed shifts:  In: 2575.23 [I.V.:1440.23; NG/GT:505]  Out: 3562.5 [Other:2887.5; Stool:675]    3. PHYSICAL EXAMINATION:  General: Laying in bed, NAD   HEENT: Atraumatic, moist mucous membranes   Pulm/Resp: Some improvement in coarse breath sounds throughout with decreased breath sounds at left base. Good air movement throughout.   CV: RRR, no m/r/g   Abdomen: Soft, non-distended, non-tender to palpation.   Ext: Trace bilateral LE edema, pulses 2+ radial, pedal  Incisions/Skin: No rashes or lesions  Neuro: Awake, follows 1-step and 2-step commands, moving all extremities    4. LABS:   Arterial Blood Gases   Recent Labs   Lab 06/26/24  0310 06/25/24  1209 06/24/24  2126 06/24/24  1653   PH 7.35 7.29* 7.33* 7.30*   PCO2 44 51* 45 50*   PO2 137* 60* 133* 80   HCO3 24 24 24 25     Complete Blood Count   Recent Labs   Lab 06/28/24  0427 06/27/24  0348 06/26/24  1200 06/26/24  0310  06/25/24 0348   WBC 4.4 5.2  --  3.0* 3.0*   HGB 8.0* 8.4* 8.8* 6.9* 7.0*   PLT 98* 112*  --  86* 70*     Basic Metabolic Panel  Recent Labs   Lab 06/28/24  0831 06/28/24  0432 06/28/24  0427 06/28/24  0024 06/27/24 2000 06/27/24  1636 06/27/24  0355 06/27/24 0348 06/26/24  1804 06/26/24  1557   NA  --   --  139  --   --  138  --  137  --  139   POTASSIUM  --   --  3.4  --   --  3.6  --  3.6  --  3.6   CHLORIDE  --   --  110*  --   --  107  --  106  --  107   CO2  --   --  21*  --   --  20*  --  22  --  23   BUN  --   --  29.3*  --   --  32.6*  --  34.4*  --  36.6*   CR  --   --  0.82  --   --  0.94  --  0.90  --  0.90   * 246* 229* 160*   < > 253*   < > 255*  265*   < > 122*  126*    < > = values in this interval not displayed.     Liver Function Tests  Recent Labs   Lab 06/28/24 0427 06/27/24 1636 06/27/24 0348 06/26/24  1557 06/26/24 0310 06/25/24  1826 06/25/24 0348   AST 16  --  20  --  22  --  16   ALT 32  --  38  --  38  --  28   ALKPHOS 122  --  135  --  134  --  114   BILITOTAL 0.7  --  1.1  --  0.8  --  0.7   ALBUMIN 2.3* 2.6* 2.4* 2.7* 2.6*   < > 2.4*    < > = values in this interval not displayed.     Coagulation Profile  No lab results found in last 7 days.      5. RADIOLOGY:   No results found for this or any previous visit (from the past 24 hour(s)).

## 2024-06-28 NOTE — PROGRESS NOTES
[facility]    Transplant Infectious Diseases Inpatient Progress note      Kecia Blue MRN# [id number]   YOB: 1960 Age: 63 year old   Date of Admission: 6/18/2024  1:47 PM  Transplant: 3/1/2018 (Lung), Postoperative day: 2311            Recommendations:   Discontinue ceftazidime after last dose today (effective therapy for UTI started 6/21/21, 7 day course for UTI)  Susceptibilities added to Aspergillis sp from respiratory sample 6/19/24.   Continue current antimicrobials:  IV minocycline 100 mg q12 hrs (started 6/20/24).   PO linezolid 600 mg q12 hrs (effective therapy for UTI started 6/21/24 and for pneumonia 6/25/24)  IV micafungin 150 mg daily (started 6/25/24).   PO posaconazole 300 mg daily (PTA)  On azithromycin per transplant pulm. Valcyte and Bactrim ppx.     Dr. Patel is available over the weekend for questions. Dr. Lewis will assume the patient's care on Monday.         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 Was found to have mucus plugs on bronchoscopy 6/19/24 followed by rigid bronchoscopy with stents placement on the right side with RMB and BI stenosis with purulent secretions. Further repeat bronchoscopies due to persistent septic shock and respiratory failure revealed mucus plugs.         Active Problems and Infectious Diseases Issues:    Acute hypercapnic and hypoxic respiratory failure, intubated 6/18/24  Shock, requiring 1-2 pressors - likely septic from respiratory source  Multilobar pneumonia, bilateral  Right mainstem bronchial stenosis requiring dilations (dilated 2/15/24, s/p 6/20/24 dilation with stent placement)  Airways colonized with ASE/VSE faecalis, VRE faecium, S maltophilia, and now filamentous fungus and Candida guilliermondii.   It is difficult to ascertain which of these pathogens is/are responsible for pneumonia vs likely airway colonizers. Will treat as above, covering all organisms at present due to shock.   It is possible that the bronchoscopy with dilation stirred the pathogens colonizing the airways and resulted in septic shock. Stent likely colonized. Respiratory status remains stable - though still on full vent support. Also on stress dose steroids for possible ARDS, further complicated by volume overload with limited ability to pull fluids on CRRT d/t pressor requirements and Afib.           Pyuria and bacteriuria in HD patient.   Urine culture with E. Coli x2, VRE faecium . The significance of these pathogens in this HD patient is not clear. Due to septic shock we are treating.   Daptomycin changed to linezolid on 6/24/24 in order to also cover the newly identified E faecium in the lungs.   Zosyn --> meropenem --> now to ceftazidime since E coli is not ESBL. Finished a 7-day course 6/28/2024.         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   Now with positive A galactomannan in one of two BALs, with Aspergillus sp growing (6/19/24 BAL only) while on therapeutic posaconazole. Micafungin was added. Poscaonzole was continued. Susceptibilities were requested.   Still unclear if the Aspergillus is responsible for the current illness.   6/21/24 BAL with negative A galactomannan and no fungal growth to date.   6/24/24 Sputum with Candida guilliermondii is airway colonizer, though is covered on  current antifungal regimen.    CMV viremia  Low positive 39 on 6/25, not unexpected at present in setting of acute illness. She is on Valcyte prophylaxis since 6/24/24 with steroid use. Continue to monitor.         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 Decreased to 135k 2/2022. Neg 6/27/22. Increased to 1 million in 11/2023 s/p 1 dose of rituximab and decreasing/undetected in 05/2024. Negative EBV 6/19/24.  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 BAL showed septate hyphae. 7/2020 had hypodense mass in left lung with biopsy showing fungal hyphae, cx aspergillus. Started on voriconazole in 2019, changed to posaconazole in 2020, she remains on 300 mg daily.  1/2021 BAL cx with steno: tx ceftazidime for 2 weeks.  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 Recovery from this required tracheostomy.  2019 - She had also grown Actinomyces from multiple BAL    Other Infectious Disease issues include:  - QTc interval:  450 msec on 6/18/24  - Bacterial prophylaxis:  azithromycin per Tx pulm  - Pneumocystis prophylaxis:  Bactrim for life (hx PJP)  - Viral serostatus & prophylaxis: CMV D+/R+, EBV D+/R+   - Fungal prophylaxis:  posaconazole  - Immunization status:  Seasonal influenza, RSV, and COVID vaccine UTD  - Gamma globulin status:  979 on 10/27/23  - Isolation status: contact for VRE      Attestation:  Total duration of visit including chart review, reviewing labs and imaging, interviewing and examining the patient, documentation, and sending communication to the patient and to the primary treating team, all at the same day of this encounter, is: 45 minutes.    Adrianna Zamarripa MD  [facility]  Contact information available via [facility] Paging/Directory or [employer]    06/28/2024      Interim  History and Events:   Still intubated.   Still on pressors.   On CRRT.   Alert, nods head. Nodded no for pain.     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      History obtained from chart review, patient's , and patient. Reported increased lethargy reported for 2-3 days PTA, poor PO intake, more tired, and weak cough. Unable to bring up sputum. Denied fever, chills, urinary symptoms (on HD), diarrhea, abdominal pain at home. Her and  both reported sinus congestion recently,  improved quickly and Kecia declined. Denies sore throat, headache. Afebrile and without leukocytosis on admission. WBC 4.0, ESR 10, lactic 0.6, and CRP ~8. Put on BIPAP given respiratory acidosis with some improvement, however unable to tolerate off BIPAP and intubated for failure to improve hypercapnia. Started on azithromycin, vancomycin, and zosyn. Negative: RVP, flu/covid/rsv, MRSA nares, Cryptococcus antigen. Pending: EBV, CMV, blood culture, fungitell, Histo Ag, AGM, and sputum culture     CT chest-hi res on 6/18/24 notable for slight decreased confluent consolidative opacities in right lower lobe with more micronodular component of possible infection in the right lower lobe. Other abnormalities throughout the lungs similar to February of the transplanted lungs - pleural effusions, anastomoses intact with narrowing of R mainstem anastomosis. New narrowing of right bronchus intermedius with  occlusion of RML bronchus (mucus plug per pulm). Febrile 100.7F following intubation, WBC 3.2, CRP up to 19, procal 0.24 in ESRD, nl lactic. Remains intubated, though on PS. On pressor x1 norepi, low dose 0.05. Nursing reports lots of secretion burden and weak cough. Planning for bronchoscopy today.     Has previously scheduled stent placement on 6/26/24 as outpatient.     ROS:  As mentioned in the interim history otherwise negative by reviewing constitutional symptoms, central and peripheral neurological systems, respiratory system, cardiac system, GI system,  system, musculoskeletal, skin, allergy, and lymphatics. But with limitation due to intubation.                 Physical Examination:  Temp: 98.7  F (37.1  C) Temp src: Axillary   Pulse: 74   Resp: 24 SpO2: 93 % O2 Device: Mechanical Ventilator    Vitals:    06/24/24 0400 06/25/24 0130 06/26/24 0300 06/27/24 0430   Weight: 56.7 kg (125 lb) 57.4 kg (126 lb 8.7 oz) 56.6 kg (124 lb 12.5 oz) 55.8 kg (123 lb 0.3 oz)    06/28/24 0500   Weight: 54.3 kg (119 lb 11.4 oz)     Constitutional: awake, Orally intubated.  Lungs: Lungs coarse bilaterally, wheezing on the left.   CVS: RRR, normal S1/S2  GI: No abdominal tenderness. Normal bowel sounds.  Extremities: 3-4 pitting edema Cool, dusky toes.  Neuro: follows commands, moves all extremities    Medications:  Medications that Require Transfusion:   Current Facility-Administered Medications   Medication Dose Route Frequency Provider Last Rate Last Admin    dexmedeTOMIDine (PRECEDEX) 4 mcg/mL in sodium chloride 0.9 % 100 mL infusion  0.1-1.2 mcg/kg/hr (Dosing Weight) Intravenous Continuous Chio Cortez MD 11.4 mL/hr at 06/28/24 0950 0.9 mcg/kg/hr at 06/28/24 0950    dextrose 10% infusion   Intravenous Continuous PRN Velez Reyes, German, MD        dextrose 10% infusion   Intravenous Continuous PRN Awa aWtt MD        dialysate for CVVHD & CVVHDF (Phoxillum BK4/2.5)  12.5 mL/kg/hr CRRT Continuous Adrianna  MD Jonna 700 mL/hr at 06/28/24 0619 12.5 mL/kg/hr at 06/28/24 0619    fentaNYL (SUBLIMAZE) infusion   mcg/hr Intravenous Continuous Velez Reyes, German, MD 1 mL/hr at 06/28/24 0803 50 mcg/hr at 06/28/24 0803    heparin (porcine) 20,000 units in 20 mL ANTICOAGULANT infusion (syringe from pharmacy)  500 Units/hr CRRT Continuous Hardy Tran APRN CNS 0.5 mL/hr at 06/28/24 0900 500 Units/hr at 06/28/24 0900    phenylephrine (DANDY-SYNEPHRINE) 50 mg in NaCl 0.9 % 250 mL infusion  0.1-6 mcg/kg/min (Dosing Weight) Intravenous Continuous Chio Cortez MD   Stopped at 06/28/24 0307    POST-filter replacement solution for CVVHD & CVVHDF (Phoxillum BK4/2.5)   CRRT Continuous Jonna Rodas  mL/hr at 06/26/24 1403 New Bag at 06/26/24 1403    PRE-filter replacement solution for CVVHD & CVVHDF (Phoxillum BK4/2.5)  12.5 mL/kg/hr CRRT Continuous Jonna Rodas  mL/hr at 06/28/24 0621 12.5 mL/kg/hr at 06/28/24 0621    vasopressin 1 unit/mL MAX Conc (PITRESSIN) infusion  1.8 Units/hr Intravenous Continuous Velez Reyes, German, MD 1.8 mL/hr at 06/28/24 0434 1.8 Units/hr at 06/28/24 0434     Scheduled Medications:   Current Facility-Administered Medications   Medication Dose Route Frequency Provider Last Rate Last Admin    acetylcysteine (MUCOMYST) 10 % nebulizer solution 4 mL  4 mL Inhalation BID Velez Reyes, German, MD   4 mL at 06/27/24 1633    azithromycin (ZITHROMAX) tablet 250 mg  250 mg Oral or Feeding Tube Daily Velez Reyes, German, MD   250 mg at 06/28/24 0812    B and C vitamin Complex with folic acid (NEPHRONEX) liquid 5 mL  5 mL Per Feeding Tube Daily Awa Watt MD   5 mL at 06/28/24 0812    calcium carbonate (TUMS) chewable tablet 500 mg  500 mg Oral Once per day on Sunday Tuesday Thursday Saturday Josesito Pratt MD   500 mg at 06/27/24 0847    cefTAZidime (FORTAZ) 2 g vial to attach to  ml bag for ADULTS or NS 50 ml bag for PEDS  2 g Intravenous Q8H Chio Cortez,  MD   2 g at 06/28/24 0434    chlorhexidine (PERIDEX) 0.12 % solution 15 mL  15 mL Mouth/Throat Q12H Chio Cortez MD   15 mL at 06/28/24 0812    epoetin alisha-epbx (RETACRIT) injection 4,000 Units  4,000 Units Intravenous Once per day on Monday Wednesday Friday Velez Reyes, German, MD   4,000 Units at 06/28/24 0812    fludrocortisone (FLORINEF) tablet 0.1 mg  0.1 mg Oral or Feeding Tube Daily Evelyn Roberts APRN CNP   0.1 mg at 06/28/24 0851    hydrocortisone sodium succinate PF (solu-CORTEF) injection 50 mg  50 mg Intravenous BID Velez Reyes, German, MD   50 mg at 06/28/24 0810    insulin aspart (NovoLOG) injection (RAPID ACTING)  1-12 Units Subcutaneous Q4H Edin Davis MD   1 Units at 06/28/24 0858    levalbuterol (XOPENEX) neb solution 0.63 mg  0.63 mg Nebulization 4x Daily Gareth Mosquera MD   0.63 mg at 06/27/24 2120    linezolid (ZYVOX) tablet 600 mg  600 mg Oral or Feeding Tube Q12H Velez Reyes, German, MD   600 mg at 06/28/24 0018    [Held by provider] magnesium chloride CR tablet 1,070 mg  1,070 mg Oral Once per day on Sunday Tuesday Thursday Saturday Josesito Pratt MD        melatonin tablet 3 mg  3 mg Oral or Feeding Tube At Bedtime Velez Reyes, German, MD   3 mg at 06/27/24 2001    metoprolol tartrate (LOPRESSOR) tablet 25 mg  25 mg Oral BID Velez Reyes, German, MD   25 mg at 06/28/24 0812    micafungin (MYCAMINE) 150 mg in sodium chloride 0.9 % 100 mL intermittent infusion  150 mg Intravenous Q24H Chio Cortez  mL/hr at 06/28/24 0018 150 mg at 06/28/24 0018    minocycline (MINOCIN) 100 mg in sodium chloride 0.9 % 100 mL intermittent infusion  100 mg Intravenous Q12H Kajal Chong APRN  mL/hr at 06/27/24 2309 100 mg at 06/27/24 2309    montelukast (SINGULAIR) tablet 10 mg  10 mg Oral At Bedtime Velez Reyes, German, MD   10 mg at 06/27/24 2134    pantoprazole (PROTONIX) 2 mg/mL suspension 40 mg  40 mg Per Feeding Tube Daily Josesito Pratt MD   40 mg  "at 06/27/24 1204    polyethylene glycol (MIRALAX) Packet 17 g  17 g Oral or Feeding Tube Daily Velez Reyes, German, MD   17 g at 06/25/24 1158    posaconazole (NOXAFIL) DR tablet TBEC 300 mg  300 mg Per Feeding Tube Daily Josesito Pratt MD   300 mg at 06/27/24 1204    [Held by provider] predniSONE (DELTASONE) tablet 5 mg  5 mg Oral Daily Velez Reyes, German, MD   5 mg at 06/21/24 0822    protein modular (PROSOURCE TF20) packet 1 packet  1 packet Per Feeding Tube BID Awa Watt MD   1 packet at 06/28/24 0856    QUEtiapine (SEROquel) tablet 150 mg  150 mg Oral or Feeding Tube At Bedtime Velez Reyes, German, MD   150 mg at 06/27/24 2134    sodium chloride (NEBUSAL) 3 % neb solution 3 mL  3 mL Nebulization BID Velez Reyes, German, MD   3 mL at 06/27/24 2117    sodium chloride (PF) 0.9% PF flush 10 mL  10 mL Intracatheter Q8H Kajal Chong, APRN CNP   10 mL at 06/27/24 1613    sodium chloride (PF) 0.9% PF flush 10 mL  10 mL Intracatheter Q8H Kajal Chong, APRN CNP   10 mL at 06/26/24 0740    sulfamethoxazole-trimethoprim (BACTRIM) 400-80 MG per tablet 1 tablet  1 tablet Oral or Feeding Tube Daily Josesito Pratt MD   1 tablet at 06/27/24 2001    tacrolimus (GENERIC) suspension 0.6 mg  0.6 mg Oral or Feeding Tube BID IS Cindy Mcadams PA-C   0.6 mg at 06/28/24 0854    valGANciclovir (VALCYTE) solution 450 mg  450 mg Per Feeding Tube Daily Rufina Huff CNP   450 mg at 06/28/24 0812    Vitamin D3 (CHOLECALCIFEROL) tablet 50 mcg  50 mcg Oral Once per day on Monday Wednesday Friday Velez Reyes, German, MD   50 mcg at 06/28/24 0851         Laboratory Data:   No results found for: \"ACD4\"    Inflammatory Markers    Recent Labs   Lab Test 06/18/24  1418 10/29/23  0642 10/28/23  0725 10/07/19  1221 10/06/19  1444 09/13/19  0934 09/11/19  2325 08/22/19  0820   SED 10 66* 58* 93*  --  111* 102*  --    CRP  --   --   --   --  25.0* 58.0* 66.0* 47.0*       Immune Globulin Studies     Recent Labs   Lab Test " 06/19/24  0839 10/27/23  0701 10/24/23  1033 09/15/21  0915 01/31/21  0441 01/25/21  0406 10/27/19  0621 03/01/18  0356 02/19/18  0759    979  --  1,198 763  --  936 698 1,790*   IGM  --   --   --   --   --   --   --   --  502*   IGE  --   --  <2  --   --  <2  --   --  <2   IGA  --   --   --   --   --   --   --   --  425*   IGG1  --   --   --   --   --   --   --   --  1,300*   IGG2  --   --   --   --   --   --   --   --  131*   IGG3  --   --   --   --   --   --   --   --  101   IGG4  --   --   --   --   --   --   --   --  1*       Metabolic Studies       Recent Labs   Lab Test 06/28/24  0831 06/28/24  0432 06/28/24  0427 06/28/24  0024 06/27/24 2000 06/27/24  1636 06/27/24  0355 06/27/24  0348 06/26/24  1804 06/26/24  1557 06/26/24  0407 06/26/24  0310 06/25/24  1847 06/25/24  1826 06/25/24  0350 06/25/24  0348 06/24/24  1942 06/24/24  1816 06/24/24  0825 06/24/24  0335 06/24/24  0013 06/23/24 2006   NA  --   --  139  --   --  138  --  137  --  139  --  138  --  138  --  136  --   --    < > 135  --   --    POTASSIUM  --   --  3.4  --   --  3.6  --  3.6  --  3.6  --  4.0  --  3.8  --  4.1  --   --    < > 4.1  --   --    CHLORIDE  --   --  110*  --   --  107  --  106  --  107  --  107  --  105  --  105  --   --    < > 106  --   --    CO2  --   --  21*  --   --  20*  --  22  --  23  --  23  --  22  --  22  --   --    < > 23  --   --    ANIONGAP  --   --  8  --   --  11  --  9  --  9  --  8  --  11  --  9  --   --    < > 6*  --   --    BUN  --   --  29.3*  --   --  32.6*  --  34.4*  --  36.6*  --  39.1*  --  38.7*  --  40.7*  --   --    < > 34.4*  --   --    CR  --   --  0.82  --   --  0.94  --  0.90  --  0.90  --  0.96*  --  0.97*  --  1.02*  --   --    < > 1.02*  --   --    GFRESTIMATED  --   --  81  --   --  69  --  72  --  72  --  67  --  66  --  62  --   --    < > 62  --   --    * 246* 229* 160* 175* 253*   < > 255*  265*   < > 122*  126*   < > 193*   < > 196*   < > 279*   < >  --    < > 207*   <  >  --    A1C  --   --   --   --   --   --   --   --   --   --   --   --   --   --   --  5.1  --   --   --   --   --   --    CHELSEY  --   --  6.9*  --   --  7.7*  --  7.8*  --  8.0*  --  7.6*  --  8.1*  --  8.0*  --   --    < > 8.1*  --   --    PHOS  --   --  4.2  --   --  4.1  --  4.3  --  3.9  --  4.0  --  4.0  --  3.9  --   --    < > 3.3  --   --    MAG  --   --  2.2  --   --  2.4*  --  2.3  --  2.4*  --  2.4*  --  2.3  --  2.4*  --   --    < > 2.4*  --   --    LACT  --   --   --   --   --   --   --   --   --   --   --   --   --   --   --   --   --  0.9  --   --   --  0.9   CKT  --   --   --   --   --   --   --  18*  --   --   --   --   --   --   --   --   --   --   --  24*  --   --     < > = values in this interval not displayed.       Hepatic Studies    Recent Labs   Lab Test 06/28/24  0427 06/27/24  1636 06/27/24  0348 06/26/24  1557 06/26/24  0310 06/25/24  1826 06/25/24  0348 06/24/24  1610 06/24/24  0335 06/23/24  1626 06/23/24  0414 06/19/24  0613 06/18/24  1803 09/15/21  0915 05/01/21  0654 01/27/21  0414 01/26/21  0425   BILITOTAL 0.7  --  1.1  --  0.8  --  0.7  --  0.7  --  0.8   < > 0.6   < >  --    < > 0.8   ALKPHOS 122  --  135  --  134  --  114  --  79  --  78   < > 235*   < >  --    < > 184*   ALBUMIN 2.3* 2.6* 2.4* 2.7* 2.6* 2.7* 2.4*   < > 2.4*   < > 2.4*   < > 3.6   < >  --    < > 2.0*   AST 16  --  20  --  22  --  16  --  12  --  17   < > 39   < >  --    < > 11   ALT 32  --  38  --  38  --  28  --  18  --  20   < > 39   < >  --    < > 30   LDH  --   --   --   --   --   --   --   --   --   --   --   --   --   --  164  --  187   GGT  --   --   --   --   --   --   --   --   --   --   --   --  147*  --   --   --   --     < > = values in this interval not displayed.       Pancreatitis testing    Recent Labs   Lab Test 06/18/24  1803 10/25/23  1356 05/26/22  0750 09/15/21  0915 01/24/21  0000 02/19/20  0713 12/31/19  1033 10/24/19  2032 10/21/19  1451 10/06/19  1444 09/11/19  2325 03/04/18  0353  "02/19/18  0759   AMYLASE  --   --   --   --   --   --   --   --   --   --   --   --  58   LIPASE 40 24  --   --   --   --   --  212 119 172 127  --   --    TRIG  --   --  155* 68 242* 77 98  --   --   --   --  191* 115       Hematology Studies      Recent Labs   Lab Test 06/28/24  0427 06/27/24  0348 06/26/24  1200 06/26/24  0310 06/25/24  0348 06/24/24  0335 06/23/24  0414   WBC 4.4 5.2  --  3.0* 3.0* 3.6* 5.4   ANEU 3.3 3.2  --  1.7 1.4* 2.8 4.6   ALYM 0.8 1.6  --  1.1 1.1 0.3* 0.1*   SHERRI 0.2 0.4  --  0.2 0.3 0.4 0.4   AEOS 0.0 0.0  --  0.0 0.0 0.0 0.2   HGB 8.0* 8.4* 8.8* 6.9* 7.0* 7.5* 7.8*   HCT 25.6* 27.1*  --  23.1* 23.0* 23.7* 24.9*   PLT 98* 112*  --  86* 70* 69* 84*       Arterial Blood Gas Testing    Recent Labs   Lab Test 06/26/24  0310 06/25/24  1209 06/24/24 2126 06/24/24  1653 06/24/24  0335   PH 7.35 7.29* 7.33* 7.30* 7.37   PCO2 44 51* 45 50* 41   PO2 137* 60* 133* 80 123*   HCO3 24 24 24 25 24   O2PER 50 50 60 60 50        Urine Studies     Recent Labs   Lab Test 06/19/24  1214 01/24/21  1729 10/21/19  2240 09/12/19  0125 08/07/19  1512   URINEPH 6.5 5.0 5.0 7.5* 7.0   NITRITE Negative Negative Negative Negative Negative   LEUKEST Large* Moderate* Large* Negative Trace*   WBCU >182* 34* 115* 3 19*       Vancomycin Levels     Recent Labs   Lab Test 06/19/24  0613 10/26/23  0606 09/21/21  1911 01/28/21  0412 01/26/21  0425 01/25/21  1259   VANCOMYCIN 15.6 18.6 18.8 21.6 31.4* 15.1       Tobramycin levels     No lab results found.    Gentamicin levels    No lab results found.    Tacrolimus levels    Invalid input(s): \"TACROLIMUS\", \"TAC\", \"TACR\"      Latest Ref Rng & Units 6/28/2024     4:27 AM 6/27/2024     4:36 PM 6/27/2024     3:48 AM 6/26/2024     3:57 PM 6/26/2024     3:10 AM   Transplant Immunosuppression Labs   Creat 0.51 - 0.95 mg/dL 0.82  0.94  0.90  0.90  0.96    Urea Nitrogen 8.0 - 23.0 mg/dL 29.3  32.6  34.4  36.6  39.1    WBC 4.0 - 11.0 10e3/uL 4.4   5.2   3.0    Neutrophil % 75   61   " "57    ANEU 1.6 - 8.3 10e3/uL 3.3   3.2   1.7        Cyclosporine levels    Invalid input(s): \"CYCLOSPORINE\", \"CYC\"    Mycophenolate levels    Invalid input(s): \"MYPA\", \"MYP\"    Sirolimus levels    Invalid input(s): \"SIROLIMUS\", \"SIR\", \"RAPA\"    CSF testing   No lab results found.      Microbiology:   6/19  CrAg - negative  Urine Strep and Legionella antigens - negative  Urine culture - GNB  BAL - negative KOH, pending aerobic + fungal + AFB cultures    6/18  Negative RVP and COVID/flu/RSV  Histoplasma antigen (blood) - pending  Aspergillus galactomannan - negative  Fungitell - negative  Blood cultures x2 - NGTD    Sputum culture - Stenotrophomonas maltophilia      Fungal testing  Recent Labs   Lab Test 06/25/24  1209 06/24/24  0335 06/21/24  1706 06/19/24  1513 06/18/24  1803 10/07/19  1544 09/14/19  1625   FGTL <31  --   --   --  <31   < > 122   FGTLI Negative  --   --   --  Negative   < > Positive*   PJRDFA  --   --   --  Not Detected  --    < >  --    ASPGAI  --  0.04 0.06 1.87 0.03   < > 1.08   ASPAG  --   --  Negative Positive*  --    < >  --    ASPGAA  --  Negative  --   --  Negative   < > Positive*   CIABB  --   --   --   --   --   --  <1:2   COFUNG  --   --   --   --  <1:2  --   --     < > = values in this interval not displayed.       Last Culture results   S Pneumoniae Antigen   Date Value Ref Range Status   01/24/2021   Final    Negative, no Streptococcus pneumoniae antigen detected by immunochromatographic membrane   assay. A negative Streptococcus pneumoniae antigen result does not rule out infection with   Streptococcus pneumoniae.       Streptococcus pneumoniae antigen   Date Value Ref Range Status   06/19/2024 Negative Negative Final     Comment:     A negative Streptococcus pneumoniae antigen result does not rule out infection with Streptococcus pneumoniae.     P. jirovecii By PCR   Date Value Ref Range Status   06/19/2024 Not Detected  Final     Comment:       NOT DETECTED - A negative result does " not rule out the   presence of PCR inhibitors in the patient specimen or assay   specific nucleic acid in concentrations below the level of   detection by the assay.    This test was developed and its performance characteristics   determined by [employer] [facility]. It has not been cleared or   approved by the US Food and Drug Administration. This test   was performed in a CLIA certified laboratory and is   intended for clinical purposes.  Performed By: [place] [id number]  : Rogelio Llanos MD, PhD  CLIA Number: [id number]   10/17/2023 Not Detected  Final     Comment:     NOT DETECTED - A negative result does not rule out the   presence of PCR inhibitors in the patient specimen or   assay specific nucleic acid in concentrations below the   level of detection by the assay.    This test was developed and its performance characteristics   determined by [employer]. It has not been cleared or   approved by the US Food and Drug Administration. This test   was performed in a CLIA certified laboratory and is   intended for clinical purposes.  Performed By: [place] [address]  : Rogelio Llanos MD, PhD  CLIA Number: [id number]   08/17/2023 Not Detected  Final     Comment:     NOT DETECTED - A negative result does not rule out the   presence of PCR inhibitors in the patient specimen or   assay specific nucleic acid in concentrations below the   level of detection by the assay.    This test was developed and its performance characteristics   determined by [employer]. It has not been cleared or   approved by the US Food and Drug Administration. This test   was performed in a CLIA certified laboratory and is   intended for clinical purposes.  Performed By: [facility]  [address] [place] [address]  : Rogelio Llanos MD, PhD  CLIA Number: [id number]    08/17/2023 Not Detected  Final     Comment:     NOT DETECTED - A negative result does not rule out the   presence of PCR inhibitors in the patient specimen or   assay specific nucleic acid in concentrations below the   level of detection by the assay.    This test was developed and its performance characteristics   determined by [facility]. It has not been cleared or   approved by the US Food and Drug Administration. This test   was performed in a CLIA certified laboratory and is   intended for clinical purposes.  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  Results should be used to aid in the diagnosis of PCP  pneumonia and must be interpreted in the context of host  risk factors, clinical presentation, and radiographic  imaging.  TEST INFORMATION: Pneumocystis jirovecii Detection by PCR  Test developed and characteristics determined by [facility]. See Compliance Statement B: [employer]/  Performed by [facility],  [address] [place] [id number] [phone]  www.[employer], Carri Red MD, Lab. Director       Culture   Date Value Ref Range Status   06/24/2024 1+ Stenotrophomonas maltophilia (A)  Final     Comment:     Susceptibilities done on previous cultures   06/24/2024 1+ Enterococcus faecium VRE (A)  Final     Comment:     Susceptibilities done on previous cultures   06/24/2024 1+ Candida guilliermondii complex (A)  Final     Comment:     Susceptibilities not routinely done, refer to antibiogram to view typical susceptibility profiles   06/24/2024 No growth after 4 days  Preliminary   06/24/2024 No growth after 4 days  Preliminary   06/21/2024 1+ Stenotrophomonas maltophilia (A)  Final     Comment:     Susceptibilities done on previous cultures   06/21/2024 1+  Enterococcus faecium VRE (A)  Final   06/21/2024 1+ Enterococcus faecalis (A)  Final     Comment:     Susceptibilities done on previous cultures   06/21/2024 No growth after 6 days  Preliminary   06/21/2024 No Growth  Final   06/21/2024 No Growth  Final   06/19/2024 1+ Stenotrophomonas maltophilia (A)  Final   06/19/2024 1+ Enterococcus faecalis (A)  Final   06/19/2024 Candida guilliermondii complex (A)  Preliminary   06/19/2024 Aspergillus species (A)  Preliminary     Comment:     Speciation in progress   06/19/2024 >100,000 CFU/mL Escherichia coli (A)  Final   06/19/2024 >100,000 CFU/mL Enterococcus faecium VRE (A)  Final   06/19/2024 50,000-100,000 CFU/mL Escherichia coli (A)  Final   06/18/2024 3+ Normal alo  Final   06/18/2024 2+ Stenotrophomonas maltophilia (A)  Final   06/18/2024 2+ Stenotrophomonas maltophilia (A)  Final     GS Culture   Date Value Ref Range Status   06/21/2024 See corresponding culture for results  Final   06/19/2024 See corresponding culture for results  Final     Culture Micro   Date Value Ref Range Status   05/01/2021   Final    Quantity not sufficient  Called to Maxim Norman at 1236 5/1/21.    mlb     05/01/2021 Culture negative after 30 days  Final   04/29/2021 No anaerobes isolated  Final   04/29/2021 No growth  Final   02/19/2021 Culture negative for acid fast bacilli  Final   02/19/2021   Final    Assayed at [facility] [address] [place] [id number] [phone]   02/19/2021 Culture negative after 4 weeks  Final   02/19/2021 No growth after 4 weeks  Final   02/19/2021 Moderate growth  Staphylococcus epidermidis   (A)  Final   02/09/2021 No growth  Final         Last check of C difficile  C Diff Toxin B PCR   Date Value Ref Range Status   02/13/2021 Negative NEG^Negative Final     Comment:     Negative: C. difficile target DNA sequences NOT detected, presumed negative   for C.difficile toxin B or the number of bacteria present may be below the   limit of detection  for the test.  FDA approved assay performed using [employer] GeneXpert real-time PCR.  A negative result does not exclude actual disease due to C. difficile and may   be due to improper collection, handling and storage of the specimen or the   number of organisms in the specimen is below the detection limit of the assay.       C Difficile Toxin B by PCR   Date Value Ref Range Status   06/22/2024 Negative Negative Final     Comment:     A negative result does not exclude actual disease due to C. difficile and may be due to improper collection, handling and storage of the specimen or the number of organisms in the specimen is below the detection limit of the assay.       Virology:  Coronavirus-19 testing    Recent Labs   Lab Test 06/18/24  1729 06/18/24  1433 06/17/24  1337 07/11/23  1132 10/12/22  1528 09/26/22  0956 05/23/22  1013 04/01/22  1257 02/07/22  1301 01/31/22  1309 01/24/22  1324 09/20/21  1859 06/07/21  1310 05/02/21  0715 04/26/21  1151 04/08/21  0723   [id number]  --   --   --   --   --   --   --   --   --   --   --   --   --  Negative  --   --    [id number]  --   --   --   --   --   --   --   --   --   --   --   --   --  Not Applicable  --   --    [id number] Negative Negative  --  Negative  --   --   --   --   --   --   --  Negative   < >  --   --  Test received-See reflex to IDDL test SARS CoV2 (COVID-19) Virus RT-PCR  NEGATIVE   [id number]  --   --   --   --   --   --   --   --   --   --   --   --   --   --   --  Nasopharyngeal   [id number]  --   --   --   --   --   --   --   --   --   --   --   --   --   --   --  Nasopharyngeal   COVIDPCREXT  --   --  Undetected  Negative  Negative  --  Negative   < > Undetected   < > Undetected Undetected   < >  --    < >  --   --   --    SOUREXT  --   --   --   --   --   --  Nasopharynx  --  Nasopharynx Nasopharynx   < >  --    < >  --    < >  --    [id number]  --   --   --   --   --   --  Undetected  --  Undetected Undetected   < >  --    < >  --    < >  --     <  > = values in this interval not displayed.       Respiratory virus (non-coronavirus-19) testing    Recent Labs   Lab Test 06/18/24  1729 06/18/24  1433 11/01/23  0920 10/27/23  0449 01/24/21  1822 01/24/21  1629 12/23/20  1102 02/27/18  1016 02/22/18  0900   RVSPEC  --   --   --   --   --  Bronchial Bronchial   < >  --    AFLU  --   --   --   --   --   --   --   --  Duplicate request*   IFLUA Not Detected  --  Not Detected Not Detected   < > Negative Negative   < >  --    INFZA Negative   < >  --   --   --   --   --   --  Negative   FLUAH1 Not Detected  --  Not Detected Not Detected   < > Negative Negative   < >  --    AD3570 Not Detected  --  Not Detected Not Detected   < > Negative Negative   < >  --    FLUAH3 Not Detected  --  Not Detected Not Detected   < > Negative Negative   < >  --    BFLU  --   --   --   --   --   --   --   --  Duplicate request*   IFLUB Not Detected  --  Not Detected Not Detected   < > Negative Negative   < >  --    INFZB Negative   < >  --   --   --   --   --   --  Negative   PIV1 Not Detected  --  Not Detected Not Detected   < > Negative Negative   < >  --    PIV2 Not Detected  --  Not Detected Not Detected   < > Negative Negative   < >  --    PIV3 Not Detected  --  Not Detected Not Detected   < > Negative Negative   < >  --    PIV4 Not Detected  --  Not Detected Not Detected   < >  --   --    < >  --    IRSV Negative   < >  --   --   --   --   --   --  Negative   HRVS  --   --   --   --   --  Negative Negative   < >  --    RSVA Not Detected  --  Not Detected Not Detected   < > Negative Negative   < >  --    RSVB Not Detected  --  Not Detected Not Detected   < > Negative Negative   < >  --    HMPV Not Detected  --  Not Detected Not Detected   < > Negative Negative   < >  --    ADVBE  --   --   --   --   --  Negative Negative   < >  --    ADVC  --   --   --   --   --  Negative Negative   < >  --    ADENOV Not Detected  --  Not Detected Not Detected   < >  --   --    < >  --    CORONA Not  "Detected  --  Not Detected Not Detected   < >  --   --    < >  --     < > = values in this interval not displayed.       CMV viral loads    Recent Labs   Lab Test 06/25/24  0647 06/18/24  1803 06/12/24  0828 03/13/24  0853 03/05/24  0925 02/21/24  0838 02/14/24  1050 12/27/23  0834 12/19/23  1138 11/29/23  0826 11/21/23  0752 11/08/23  0846 11/01/23  0920 10/31/23  0606 10/24/23  1033 10/17/23  1011 10/04/23  0810 09/07/23  0710 08/17/23  1144 08/17/23  1137 08/08/23  0828 08/08/23  0828 07/11/23  0952 05/26/23  0828 04/06/23  0725 03/08/23  0848 02/09/23  1034 11/18/22  0928 09/27/22  1012 03/15/21  0904 02/25/21  0542   CMVQNT  --  <35*  --   --  Not Detected  --   --   --  Not Detected  --  Not Detected  --   --  <35*  --   --   --   --   --   --   --   --  Not Detected  --  Not Detected  --  <137*  --  Not Detected   < > CMV DNA Not Detected   CMVRESINST 39*  --   --   --   --   --  350*  --   --   --   --   --   --   --  103*  80* 75*  --  521*  --   --   --  46*  --   --   --   --   --   --   --   --   --    CSPEC  --   --   --   --   --   --   --   --   --   --   --   --   --   --   --   --   --   --   --   --   --   --   --   --   --   --   --   --   --   --  EDTA PLASMA   CMVLOG 1.6 <1.5  --   --   --   --  2.5  --   --   --   --   --   --  <1.5 2.0  1.9 1.9  --  2.7  --   --    < > 1.7  --   --   --   --  <2.1  --   --    < > Not Calculated   75219  --   --  46*   < >  --    < >  --    < >  --    < >  --    < >  --   --   --   --    < >  --   --   --   --   --   --    < >  --    < >  --    < >  --    < >  --    CMVQAL  --   --   --   --   --   --   --   --   --   --   --   --  Not Detected  --   --   --   --   --  Not Detected Not Detected  --   --   --   --   --   --   --   --   --   --   --     < > = values in this interval not displayed.       No results found for: \"H6RES\"    EBV DNA Copies/mL   Date Value Ref Range Status   05/01/2021 38,186 (A) EBVNEG^EBV DNA Not Detected [Copies]/mL Final "   02/22/2021 75,709 (A) EBVNEG^EBV DNA Not Detected [Copies]/mL Final   01/25/2021 5,619 (A) EBVNEG^EBV DNA Not Detected [Copies]/mL Final   12/09/2020 10,686 (A) EBVNEG^EBV DNA Not Detected [Copies]/mL Final   09/09/2020 2,935 (A) EBVNEG^EBV DNA Not Detected [Copies]/mL Final   03/09/2020 8,918 (A) EBVNEG^EBV DNA Not Detected [Copies]/mL Final   02/19/2020 38,419 (A) EBVNEG^EBV DNA Not Detected [Copies]/mL Final   12/18/2019 11,933 (A) EBVNEG^EBV DNA Not Detected [Copies]/mL Final   11/11/2019 9,669 (A) EBVNEG^EBV DNA Not Detected [Copies]/mL Final   10/24/2019 13,391 (A) EBVNEG^EBV DNA Not Detected [Copies]/mL Final   08/01/2018 EBV DNA Not Detected EBVNEG^EBV DNA Not Detected [Copies]/mL Final       BK viral loads   Recent Labs   Lab Test 08/07/19  0959   BKSPEC Plasma   BKRES BK Virus DNA Not Detected         Imaging:  CXR 6/26/24  Impression: Stable support devices. Similar pulmonary opacities, left  more than right, compared to prior radiograph from 6/24/2024,  representing edema/infection with atelectasis.    CXR 6/24/24  Impression:   1. Overall improved aeration in the left lung with persistent  opacities in the left lung and right lung base, likely improving  pulmonary edema or infection.  2. Stable support devices.    XR Chest Port 1 View  Result Date: 6/20/2024  Impression: Bilateral pulmonary opacities, mildly increased in the upper lobes suggesting increased edema and underlying infection. The endotracheal tube projects 2.3 cm above the gee.    XR Chest Port 1 View  Result Date: 6/19/2024  IMPRESSION: ET tube tip projects 1.6 cm above the gee. Small bilateral pleural effusions with bibasilar, right greater than left airspace opacities suspicious for infection versus atelectasis.     CT Chest Hi-Resolution wo Contrast  Result Date: 6/18/2024  IMPRESSION: Bilateral transplanted lungs. Slight decreased confluent consolidative opacities in right lower lobe with more micronodular component of possible  infection in the right lower lobe. Other abnormalities throughout the lungs similar to February of the transplanted lungs. Pleural effusions again noted. Narrowing of the bronchus intermedius on the right there appears to be occlusion of the right middle lobe bronchus with narrowing of the distal aspect of the right lower lobe bronchus with short segment occlusions of the medial and posterior basilar bronchial segments to the right lower lobe. No such abnormality on the left.. No ectopic air. Anastomoses appear grossly intact bilaterally with just under approximate 50% narrowing distal to the right mainstem anastomosis proximal to the upper lobe bronchus takeoff. This right middle lobe bronchial occlusion is new from February. Mitral annular calcifications with left atrial prominence, please correlate for arrhythmia. Borderline pulmonary enlargement concerning for possible pulmonary hypertension. Borderline mid ascending aortic ectasia.       ECHO:  Echo Complete  Result Date: 6/19/2024  Interpretation Summary Global and regional left ventricular function is normal with an EF of 55-60%. Right ventricular function, chamber size, wall motion, and thickness are normal. The inferior vena cava cannot be assessed. No pericardial effusion is present. No significant valvular abnormalities present.     Adrianna Zamarripa MD  [facility]  Contact information available via [facility] Paging/Directory

## 2024-06-28 NOTE — PLAN OF CARE
ICU End of Shift Summary. See flowsheets for vital signs and detailed assessment.    Changes this shift: Very agitated, hallucinating that she heard and saw rattle snakes. Sedation increased due to agitation- weaned back down this am. TF off at 0300 for US this am. Kcl replaced. Bladder scanned for 80ml.     Plan: US this am, attempt PST again.       Restraints:  Mitts still in place. Patient continues to try and pull at ETT when hallucinating. Continue to education and revaluate for ability to remove.     Goal Outcome Evaluation:      Plan of Care Reviewed With: patient    Overall Patient Progress: no changeOverall Patient Progress: no change    Outcome Evaluation: Pulling on CRRT

## 2024-06-28 NOTE — PROGRESS NOTES
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  The patient was admitted on 24 for progressive hypoxia with dyspnea and worsening hypercapnia in ED requiring intubation likely d/t post-obstructive PNA 2/2 right bronchus stenosis.  Bronch confirming severe stenosis of right anastomosis with extensive RLL plugging.  IP bronch () with laser tissue debulking RMB stenosis, airway balloon dilation of RMB and BI, and placement of stent RMB to BI and bifurcating stent in RUL.  S/p volume resuscitation and transition to CRRT  for hypotension.  Increased agitation/delirium on  with increasing desaturation led to need for increased sedation.  Recurrence of paroxysmal afib with RVR first noted .  Remains intubated with pulmonary edema on imaging and septic shock requiring pressor support, poor tolerance of PST.        Today's recommendations:  - Follow pending cultures, ABX per transplant ID  - Repeat bronch with IP due at 6 weeks (from ), but recommend reevaluation sooner given poor tolerance of PST  - Repeat Prospera and ImmuKnow in 2-4 weeks (from ) as OP  - Tacrolimus level to trend ordered   - Prednisone chronic dose on hold for stress-dose steroids per MICU, resume if hydrocortisone <20 mg daily  -  Posaconazole chronically, micafungin 150 mg daily per transplant ID  - CMV weekly monitoring (qTuesday) and VGCV ppx given stress dose steroids with tentative plan for 3 weeks beyond completion of steroid course  - Volume removal with CRRT as able per nephrology     Septic shock:  Acute on chronic hypoxic/hypercapneic respiratory failure:  Post-obstructive multi-lobular PNA:  Right bronchial stenosis with RML collapse: Admitted with 2 weeks of progressive hypoxia, dyspnea, congested cough, and fatigue.  Chronic hypoxia with 2L NC, increased from 3 to 4L over this time with acute decompensation on day of admission associated with hypercapnia.  VBG 7/17/85 in ED s/p intubation.  IP bronch 2/15 s/p tissue debulking without stent placement.  Negative ID workup: respiratory panel, COVID, MRSA nares, PJP, Legionella, Strep pneumoniae, cocci, Histo, CrAg, fungitell x2, and A. galactomannan (blood).  IgG WNL.  Procal mildly elevated at 0.24 initially (then elevated to 1.77 6/20), LA normal, but febrile to 100.7.  TTE on admission grossly normal.  CT with RUL/RLL consolidative opacities, RML collapse, and narrowing of BI and distal RLL bronchus.  Started on norepi 6/19.  Bronch per MICU (6/19) with severe stenosis starting at right anastomosis, inspection with smaller scope revealing for extensive RLL mucous plugging.  Bronch per IP (6/20) with laser tissue debulking RMB stenosis, airway balloon dilation of RMB and BI, and placement of stent RMB to BI and bifurcating stent in RUL.  Intermittent/low grade fevers persisting.  Remains on full vent support, very poor tolerance of PST.    - Bronch culture (6/19) with Steno, VSE. faecalis, Candida guilliermondii complex and Aspergillus, susceptibilities requested by transplant ID  - BAL (6/21) with Steno, VRE, and E. faecalis  - Sputum culture (6/24) with Steno, VRE, and Candida guilliermondii complex  - Blood cultures (6/24) NGTD  - ABX: ceftazidime (6/25), linezolid (6/25), and  minocycline (6/20, Steno) per transplant ID; s/p meropenem (6/21-6/24), daptomycin (6/21-6/24), levofloxacin (6/21-6/23), Zosyn (6/18-6/21), azithromycin 500 mg daily (6/18-6/20), and vancomycin (6/18)   - Nebs: levalbuterol QID, Mucomyst 10% BID alternating with 3% HTS (6/22), will need to be on NS nebs BID upon discharge per IP  - Vent management per MICU  - Stress dose steroids per MICU (started 6/21, decreased 6/26)  - Repeat bronch with IP due at 6 weeks (from 6/20), but recommend reevaluation sooner given poor tolerance of PST     S/p bilateral sequential lung transplant (BSLT) for ILD:   Progressive CLAD: Most recent OP visit in February.  DSA negative 6/19 (last positive noted 2018).  Repeat ImmuKnow (6/19) 87, very low but IST adjustment deferred per Dr. Graham given acuity and steroids (after ImmuKnow level).  Repeat Prospera remains high at 2.3 on 6/19, may be artificially elevated with current infection, but also notably high at 2.42 on 2/14.  - Repeat Prospera and ImmuKnow in 2-4 weeks (from 6/19) as OP  - PTA Singulair, azithromycin, and Breo inhaler (resume once extubated)     Immunosuppression:  On 2-drug IST since November d/t leukopenia and recurrent infections  - Tacrolimus 0.6 mg BID (increased 6/27).  Goal level 8-10.  Repeat level to trend ordered 6/29 with management per our team.  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  - ABX and infectious work up as above     Aspergillus ochraceus:  H/o Aspergillus empyema:  S/p empyema drainage and ampho B instillation.  Previously on voriconazole, transitioned to posaconazole and managed by transplant ID as OP with last visit 3/13.  Calcified fungal elements remain with additional  recurrent effusion (likely associated with fluid disturbances r/t iHD), but surgical intervention previously deferred d/t risk.  Posaconazole level 2.5 on 6/19.  - Posaconazole 300 mg daily chronically, micafungin 150 mg daily (6/25) per transplant ID     H/o CMV viremia: Recurrent CMV viremia historically.  VGCV ppx most recently stopped 4/12.  Recent CMV low positive at 46 (6/12), <35 (6/18), and 39 (6/25).  - CMV weekly monitoring (qTuesday) with steroid increase  - VGCV ppx (renally dosed) given stress dose steroids (6/24) with tentative plan for 3 weeks beyond completion of stress dose steroids      EBV viremia: S/p rituximab 12/13/23 x1 dose.  Most recent EBV negative 6/18.     ESRD on iHD: Kidney evaluation started as OP.  On qMWF iHD schedule, no missed sessions.  Transitioned to CRRT on 6/20, management per renal and MICU with eventual goal for fluid removal as able.      VRE urine culture: Noted 6/19, >100k GNB and VRE faecium, ABX as above with management per MICU team and transplant ID.     We appreciate the excellent care provided by the MICU team.  Recommendations communicated via this note.  Will continue to follow along closely, please do not hesitate to call with any questions or concerns.     Patient discussed with Dr. Graham.    Hina Huff, DNP, APRN, CNP  Inpatient Nurse Practitioner  Pulmonary CF/Transplant     Subjective & Interval History:     Remains intubated on full vent support.  Poor tolerance of PST yesterday at 10/7, attempted x3 but tolerated for 10 minutes or less d/t tachypnea, low TV, and hypoxia.  PST again this morning at 5/5/45 per MICU, tolerated <5 minutes with same issues.  Minimal secretions per ETT.  Working on pulling with CRRT.    Review of Systems:     ROS as above, otherwise significantly limited due to intubation and sedation.    Physical Exam:     All notes, images, and labs from past 24 hours (at minimum) were reviewed.    Vital signs:  Temp: 98.5  F (36.9  C) Temp  "src: Oral BP: (!) 156/104 Pulse: 85   Resp: (!) 35 SpO2: 97 % O2 Device: Mechanical Ventilator Oxygen Delivery: 6 LPM Height: 160 cm (5' 3\") Weight: 54.3 kg (119 lb 11.4 oz)  I/O:   Intake/Output Summary (Last 24 hours) at 6/28/2024 0756  Last data filed at 6/28/2024 0700  Gross per 24 hour   Intake 2528.73 ml   Output 3650.1 ml   Net -1121.37 ml     Constitutional: Lying supine in bed, spouse at bedside, in no apparent distress.   HEENT: Eyes with pink conjunctivae, anicteric.  Orally intubated.  Left nare feeding tube.  PULM: Mildly diminished air flow bilaterally.  Coarse crackles with expiratory wheezes t/o.  Labored breathing on PST with  ml at best, RR upper 20's and climbing, desat down to 82% before full vent support resumed.  CV: Normal S1 and S2.  RRR.  No murmur, gallop, or rub.  = BLE edema.   ABD: NABS, soft, nontender, nondistended.    MSK: Moves all extremities.    NEURO: Lethargic but arousable, minimally conversant.   SKIN: Warm, dry, fragile.   PSYCH: Restless.     Data:     LABS    CMP:   Recent Labs   Lab 06/28/24  0432 06/28/24  0427 06/28/24  0024 06/27/24 2000 06/27/24  1636 06/27/24  0355 06/27/24  0348 06/26/24  1804 06/26/24  1557 06/26/24  0407 06/26/24  0310 06/25/24  0350 06/25/24  0348   NA  --  139  --   --  138  --  137  --  139  --  138   < > 136   POTASSIUM  --  3.4  --   --  3.6  --  3.6  --  3.6  --  4.0   < > 4.1   CHLORIDE  --  110*  --   --  107  --  106  --  107  --  107   < > 105   CO2  --  21*  --   --  20*  --  22  --  23  --  23   < > 22   ANIONGAP  --  8  --   --  11  --  9  --  9  --  8   < > 9   * 229* 160* 175* 253*   < > 255*  265*   < > 122*  126*   < > 193*   < > 279*   BUN  --  29.3*  --   --  32.6*  --  34.4*  --  36.6*  --  39.1*   < > 40.7*   CR  --  0.82  --   --  0.94  --  0.90  --  0.90  --  0.96*   < > 1.02*   GFRESTIMATED  --  81  --   --  69  --  72  --  72  --  67   < > 62   CHELSEY  --  6.9*  --   --  7.7*  --  7.8*  --  8.0*  --  7.6*   < > " "8.0*   MAG  --  2.2  --   --  2.4*  --  2.3  --  2.4*  --  2.4*   < > 2.4*   PHOS  --  4.2  --   --  4.1  --  4.3  --  3.9  --  4.0   < > 3.9   PROTTOTAL  --  4.3*  --   --   --   --  4.6*  --   --   --  4.6*  --  4.6*   ALBUMIN  --  2.3*  --   --  2.6*  --  2.4*  --  2.7*  --  2.6*   < > 2.4*   BILITOTAL  --  0.7  --   --   --   --  1.1  --   --   --  0.8  --  0.7   ALKPHOS  --  122  --   --   --   --  135  --   --   --  134  --  114   AST  --  16  --   --   --   --  20  --   --   --  22  --  16   ALT  --  32  --   --   --   --  38  --   --   --  38  --  28    < > = values in this interval not displayed.     CBC:   Recent Labs   Lab 06/28/24  0427 06/27/24  0348 06/26/24  1200 06/26/24  0310 06/25/24  0348   WBC 4.4 5.2  --  3.0* 3.0*   RBC 2.64* 2.76*  --  2.28* 2.27*   HGB 8.0* 8.4* 8.8* 6.9* 7.0*   HCT 25.6* 27.1*  --  23.1* 23.0*   MCV 97 98  --  101* 101*   MCH 30.3 30.4  --  30.3 30.8   MCHC 31.3* 31.0*  --  29.9* 30.4*   RDW 17.1* 17.3*  --  15.0 14.9   PLT 98* 112*  --  86* 70*       INR: No lab results found in last 7 days.    Glucose:   Recent Labs   Lab 06/28/24  0432 06/28/24  0427 06/28/24  0024 06/27/24  2000 06/27/24  1636 06/27/24  1617   * 229* 160* 175* 253* 241*       Blood Gas:   Recent Labs   Lab 06/26/24  0310 06/25/24  1209 06/24/24  2126 06/23/24  1639 06/23/24  1143 06/23/24  0906   PHV  --   --   --   --  7.28* 7.28*   PCO2V  --   --   --   --  55* 53*   PO2V  --   --   --   --  35 35   HCO3V  --   --   --   --  25 25   LASHAUN  --   --   --   --  -1.6 -2.1   O2PER 50 50 60   < > 60 50  50    < > = values in this interval not displayed.       Culture Data No results for input(s): \"CULT\" in the last 168 hours.    Virology Data:   Lab Results   Component Value Date    FLUAH1 Not Detected 06/18/2024    FLUAH3 Not Detected 06/18/2024    MZ2714 Not Detected 06/18/2024    IFLUB Not Detected 06/18/2024    RSVA Not Detected 06/18/2024    RSVB Not Detected 06/18/2024    PIV1 Not Detected " 06/18/2024    PIV2 Not Detected 06/18/2024    PIV3 Not Detected 06/18/2024    HMPV Not Detected 06/18/2024    HRVS Negative 01/24/2021    ADVBE Negative 01/24/2021    ADVC Negative 01/24/2021    ADVC Negative 12/23/2020    ADVC Negative 10/07/2019       Historical CMV results (last 3 of prior testing):  Lab Results   Component Value Date    CMVQNT <35 (A) 06/18/2024    CMVQNT Not Detected 03/05/2024    CMVQNT Not Detected 12/19/2023     Lab Results   Component Value Date    CMVLOG 1.6 06/25/2024    CMVLOG <1.5 06/18/2024    CMVLOG 2.5 02/14/2024       Urine Studies    Recent Labs   Lab Test 06/19/24  1214 01/24/21  1729   URINEPH 6.5 5.0   NITRITE Negative Negative   LEUKEST Large* Moderate*   WBCU >182* 34*       Most Recent [place] Pulmonary Function Testing (FVC/FEV1 only)  FVC-Pre   Date Value Ref Range Status   02/14/2024 0.85 L    12/19/2023 1.04 L    11/21/2023 0.85 L    10/24/2023 0.96 L      FVC-%Pred-Pre   Date Value Ref Range Status   02/14/2024 29 %    12/19/2023 36 %    11/21/2023 29 %    10/24/2023 33 %      FEV1-Pre   Date Value Ref Range Status   02/14/2024 0.80 L    12/19/2023 0.89 L    11/21/2023 0.78 L    10/24/2023 0.87 L      FEV1-%Pred-Pre   Date Value Ref Range Status   02/14/2024 34 %    12/19/2023 38 %    11/21/2023 33 %    10/24/2023 37 %        IMAGING    Recent Results (from the past 48 hour(s))   XR Chest Port 1 View    Narrative    Exam: XR CHEST PORT 1 VIEW, 6/26/2024 10:07 AM    Indication: hypoxia    Comparison: Chest x-ray 6/24/2024    Findings:     Portable frontal projection chest radiograph. Similar postsurgical  changes and support devices including sternotomy wires, right  bronchial stent, endotracheal tube, right IJ catheter, right midline  catheter and partially visualized enteric tube. Similar pulmonary  opacities, left more than right, predominantly mid and lower zone and  cardiac silhouette. No acute airspace opacity. No discernible  pneumothorax. Possible trace effusions.  Low lung volumes      Impression    Impression: Stable support devices. Similar pulmonary opacities, left  more than right, compared to prior radiograph from 6/24/2024,  representing edema/infection with atelectasis.    EDIE VLILA MD         SYSTEM ID:  [id number]

## 2024-06-28 NOTE — PROGRESS NOTES
CRRT STATUS NOTE    DATA:  Time:  6:54 AM  Pressures WNL:  YES  Filter Status:  WDL    Problems Reported/Alarms Noted:  None    Supplies Present:  YES    ASSESSMENT:  Patient Net Fluid Balance:  -849 mL 6/27, -203 mL since MN  Vital Signs:  Temp:  [98.3  F (36.8  C)-100  F (37.8  C)] 98.5  F (36.9  C)  Pulse:  [] 80  Resp:  [18-62] 35  BP: (156)/(104) 156/104  MAP:  [59 mmHg-106 mmHg] 80 mmHg  Arterial Line BP: ()/(39-69) 134/54  FiO2 (%):  [40 %-45 %] 45 %  SpO2:  [73 %-100 %] 96 %    Labs:    Lab Results   Component Value Date    WBC 4.4 06/28/2024    HGB 8.0 (L) 06/28/2024    HCT 25.6 (L) 06/28/2024    PLT 98 (L) 06/28/2024     06/28/2024    POTASSIUM 3.4 06/28/2024    CHLORIDE 110 (H) 06/28/2024    CO2 21 (L) 06/28/2024    BUN 29.3 (H) 06/28/2024    CR 0.82 06/28/2024     (H) 06/28/2024    SED 10 06/18/2024    DD 2.0 (H) 02/11/2021    NTBNPI 97,619 (H) 09/20/2021    TROPONIN 0.020 09/20/2021    TROPI <0.015 01/24/2021    AST 16 06/28/2024    ALT 32 06/28/2024     (H) 06/18/2024    ALKPHOS 122 06/28/2024    BILITOTAL 0.7 06/28/2024    SALAS <10 (L) 01/24/2021    INR 0.98 06/18/2024       Goals of Therapy:  Net negative 0-50 mL/hr, goal 0.5-1L negative     INTERVENTIONS:   Scheduled circuit change done this morning.     PLAN:  Continue plan of care. Contact CRRT Resource via [employer] with any questions or concerns.

## 2024-06-28 NOTE — PROGRESS NOTES
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 Nephrology consulted for management of HD while admitted.      Interval History :   Mrs Blue continues on CRRT, slightly net negative yesterday, ordered for net even today while we try to wean some pressor support. On exam has no edema, is ~2kg above EDW but likely some variability in scale.     Assessment & Recommendations:   ESRD-ESKD: pt dialyzes MWF at [facility] (ph [phone], f [phone]) with Dr. Glasgow. Run time: 3.5 hrs. EDW 52.5 kg. Access: L AVF. +heparin 1,500u bolus.                  -Appreciate team placing tri-alysis line to run norepi and will plan for CRRT, 4k baths, planning 0-50cc/h net negative, 500u heparin/h through circuit.                -No need for new dialysis consent, continuing long term HD for ESRD.                -L AVF with remote hx of needing intervention, no issue recently.      Outpt Rx:       Volume-Above EDW by ~2kg but may have some variability with scales, ordered for I=O.      Pulm-Admitted with hypercapnic resp failure, suspected PNA. ID involved, getting bronch.  Currently on Zosyn, bactrim, Posaconazole, Azithromycin and Vanco x1 dose so far.       Electrolytes-Stable on CRRT, 4k baths, checking labs BID.      BMD-Stable on CRRT, has replacements ordered with CRRT order set.      Anemia-Hgb ~8 and drifting, last PRBC's 6/26 on venofer 50mg weekly but will hold while treating for infection. On Mircera 60mcg x9pdgai, will cover with short term Epo while admitted when stable enough for HD.       Nutrition-Novasource renal started      Time spent:  "50 minutes on this date of encounter for chart review, physical exam, medical decision making and co-ordination of care.      Seen and discussed with Dr Vazquez     Recommendations were communicated to primary team via verbal communication.     ADRIÁN Lo CNS  Clinical Nurse Specialist  [phone]    Review of Systems:   I reviewed the following systems:  ROS not done due to vent/sedation    Physical Exam:   I/O last 3 completed shifts:  In: 2575.23 [I.V.:1440.23; NG/GT:505]  Out: 3562.5 [Other:2887.5; Stool:675]   BP (!) 156/104   Pulse 89   Temp 98.7  F (37.1  C) (Axillary)   Resp 24   Ht 1.6 m (5' 3\")   Wt 54.3 kg (119 lb 11.4 oz)   LMP 06/07/2014 (Exact Date)   SpO2 (!) 88%   BMI 21.21 kg/m       GENERAL APPEARANCE: Intubated but alert.    EYES: No scleral icterus  Pulmonary: lungs Rhonchi to auscultation with equal breath sounds bilaterally  CV: Regular rhythm, normal rate   - Edema none  GI: soft, nontender, normal bowel sounds  MS: no evidence of inflammation in joints, no muscle tenderness  : No Basilio  SKIN: no rash, warm, dry  NEURO: No focal deficits    Labs:   All labs reviewed by me  Electrolytes/Renal -   Recent Labs   Lab Test 06/28/24  0831 06/28/24  0432 06/28/24  0427 06/27/24 2000 06/27/24  1636 06/27/24  0355 06/27/24  0348   NA  --   --  139  --  138  --  137   POTASSIUM  --   --  3.4  --  3.6  --  3.6   CHLORIDE  --   --  110*  --  107  --  106   CO2  --   --  21*  --  20*  --  22   BUN  --   --  29.3*  --  32.6*  --  34.4*   CR  --   --  0.82  --  0.94  --  0.90   * 246* 229*   < > 253*   < > 255*  265*   CHELSEY  --   --  6.9*  --  7.7*  --  7.8*   MAG  --   --  2.2  --  2.4*  --  2.3   PHOS  --   --  4.2  --  4.1  --  4.3    < > = values in this interval not displayed.       CBC -   Recent Labs   Lab Test 06/28/24  0427 06/27/24  0348 06/26/24  1200 06/26/24  0310   WBC 4.4 5.2  --  3.0*   HGB 8.0* 8.4* 8.8* 6.9*   PLT 98* 112*  --  86*       LFTs -   Recent Labs "   Lab Test 06/28/24  0427 06/27/24  1636 06/27/24  0348 06/26/24  1557 06/26/24  0310   ALKPHOS 122  --  135  --  134   BILITOTAL 0.7  --  1.1  --  0.8   ALT 32  --  38  --  38   AST 16  --  20  --  22   PROTTOTAL 4.3*  --  4.6*  --  4.6*   ALBUMIN 2.3* 2.6* 2.4*   < > 2.6*    < > = values in this interval not displayed.       Iron Panel -   Recent Labs   Lab Test 02/03/21  0415 12/13/18  1033 08/01/18  0921   IRON 51 16* 93   IRONSAT 36 7* 37   YOLA  --  302* 571*           Current Medications:  Current Facility-Administered Medications   Medication Dose Route Frequency Provider Last Rate Last Admin    acetylcysteine (MUCOMYST) 10 % nebulizer solution 4 mL  4 mL Inhalation BID Velez Reyes, German, MD   4 mL at 06/28/24 0954    azithromycin (ZITHROMAX) tablet 250 mg  250 mg Oral or Feeding Tube Daily Velez Reyes, German, MD   250 mg at 06/28/24 0812    B and C vitamin Complex with folic acid (NEPHRONEX) liquid 5 mL  5 mL Per Feeding Tube Daily Awa Watt MD   5 mL at 06/28/24 0812    calcium carbonate (TUMS) chewable tablet 500 mg  500 mg Oral Once per day on Sunday Tuesday Thursday Saturday Josesito Pratt MD   500 mg at 06/27/24 0847    cefTAZidime (FORTAZ) 2 g vial to attach to  ml bag for ADULTS or NS 50 ml bag for PEDS  2 g Intravenous Q8H Velez Reyes, German, MD   2 g at 06/28/24 0434    chlorhexidine (PERIDEX) 0.12 % solution 15 mL  15 mL Mouth/Throat Q12H Chio Cortez MD   15 mL at 06/28/24 0812    epoetin alisha-epbx (RETACRIT) injection 4,000 Units  4,000 Units Intravenous Once per day on Monday Wednesday Friday Velez Reyes, German, MD   4,000 Units at 06/28/24 0812    [START ON 6/29/2024] fludrocortisone (FLORINEF) half-tab 0.05 mg  0.05 mg Oral or Feeding Tube Daily Velez Reyes, German, MD        hydrocortisone sodium succinate PF (solu-CORTEF) injection 25 mg  25 mg Intravenous BID Velez Reyes, German, MD        insulin aspart (NovoLOG) injection (RAPID ACTING)  1-12 Units Subcutaneous  Q4H Edin Davis MD   1 Units at 06/28/24 0858    levalbuterol (XOPENEX) neb solution 0.63 mg  0.63 mg Nebulization 4x Daily Gareth Mosquera MD   0.63 mg at 06/28/24 1144    linezolid (ZYVOX) tablet 600 mg  600 mg Oral or Feeding Tube Q12H Velez Reyes, German, MD   600 mg at 06/28/24 0018    [Held by provider] magnesium chloride CR tablet 1,070 mg  1,070 mg Oral Once per day on Sunday Tuesday Thursday Saturday Josesito Pratt MD        melatonin tablet 3 mg  3 mg Oral or Feeding Tube At Bedtime Velez Reyes, German, MD   3 mg at 06/27/24 2001    metoprolol tartrate (LOPRESSOR) tablet 25 mg  25 mg Oral BID Velez Reyes, German, MD   25 mg at 06/28/24 0812    micafungin (MYCAMINE) 150 mg in sodium chloride 0.9 % 100 mL intermittent infusion  150 mg Intravenous Q24H Chio Cortez  mL/hr at 06/28/24 0018 150 mg at 06/28/24 0018    minocycline (MINOCIN) 100 mg in sodium chloride 0.9 % 100 mL intermittent infusion  100 mg Intravenous Q12H Kajal Chong APRN  mL/hr at 06/27/24 2309 100 mg at 06/27/24 2309    montelukast (SINGULAIR) tablet 10 mg  10 mg Oral At Bedtime Velez Reyes, German, MD   10 mg at 06/27/24 2134    pantoprazole (PROTONIX) 2 mg/mL suspension 40 mg  40 mg Per Feeding Tube Daily Josesito Pratt MD   40 mg at 06/27/24 1204    posaconazole (NOXAFIL) DR tablet TBEC 300 mg  300 mg Per Feeding Tube Daily Josesito Pratt MD   300 mg at 06/27/24 1204    [Held by provider] predniSONE (DELTASONE) tablet 5 mg  5 mg Oral Daily Velez Reyes, German, MD   5 mg at 06/21/24 0822    protein modular (PROSOURCE TF20) packet 1 packet  1 packet Per Feeding Tube BID Awa Watt MD   1 packet at 06/28/24 0856    QUEtiapine (SEROquel) tablet 150 mg  150 mg Oral or Feeding Tube At Bedtime Velez Reyes, German, MD   150 mg at 06/27/24 2134    sodium chloride (NEBUSAL) 3 % neb solution 3 mL  3 mL Nebulization BID Velez Reyes, German, MD   3 mL at 06/28/24 1144    sodium chloride (PF) 0.9% PF  flush 10 mL  10 mL Intracatheter Q8H Kajal Chong, ADRIÁN CNP   10 mL at 06/28/24 1219    sodium chloride (PF) 0.9% PF flush 10 mL  10 mL Intracatheter Q8H Kajal Chong APRN CNP   10 mL at 06/26/24 0740    sulfamethoxazole-trimethoprim (BACTRIM) 400-80 MG per tablet 1 tablet  1 tablet Oral or Feeding Tube Daily Josesito Pratt MD   1 tablet at 06/27/24 2001    tacrolimus (GENERIC) suspension 0.6 mg  0.6 mg Oral or Feeding Tube BID IS Cindy Mcadams PA-C   0.6 mg at 06/28/24 0854    valGANciclovir (VALCYTE) solution 450 mg  450 mg Per Feeding Tube Daily Rufina Huff, CNP   450 mg at 06/28/24 0812    Vitamin D3 (CHOLECALCIFEROL) tablet 50 mcg  50 mcg Oral Once per day on Monday Wednesday Friday Velez Reyes, German, MD   50 mcg at 06/28/24 0851     Current Facility-Administered Medications   Medication Dose Route Frequency Provider Last Rate Last Admin    dexmedeTOMIDine (PRECEDEX) 4 mcg/mL in sodium chloride 0.9 % 100 mL infusion  0.1-1.2 mcg/kg/hr (Dosing Weight) Intravenous Continuous Chio Cortez MD 11.4 mL/hr at 06/28/24 1100 0.9 mcg/kg/hr at 06/28/24 1100    dextrose 10% infusion   Intravenous Continuous PRN Velez Reyes, German, MD        dextrose 10% infusion   Intravenous Continuous PRN Awa Watt MD        dialysate for CVVHD & CVVHDF (Phoxillum BK4/2.5)  12.5 mL/kg/hr CRRT Continuous Jonna Rodas  mL/hr at 06/28/24 0619 12.5 mL/kg/hr at 06/28/24 0619    fentaNYL (SUBLIMAZE) infusion   mcg/hr Intravenous Continuous Velez Reyes, German, MD 1 mL/hr at 06/28/24 1115 50 mcg/hr at 06/28/24 1115    heparin (porcine) 20,000 units in 20 mL ANTICOAGULANT infusion (syringe from pharmacy)  500 Units/hr CRRT Continuous Hardy Tran APRN CNS 0.5 mL/hr at 06/28/24 1200 500 Units/hr at 06/28/24 1200    phenylephrine (DANDY-SYNEPHRINE) 50 mg in NaCl 0.9 % 250 mL infusion  0.1-6 mcg/kg/min (Dosing Weight) Intravenous Continuous Chio Cortez MD   Stopped at  06/28/24 0307    POST-filter replacement solution for CVVHD & CVVHDF (Phoxillum BK4/2.5)   CRRT Continuous Jonna Rodas  mL/hr at 06/26/24 1403 New Bag at 06/26/24 1403    PRE-filter replacement solution for CVVHD & CVVHDF (Phoxillum BK4/2.5)  12.5 mL/kg/hr CRRT Continuous Jonna Rodas  mL/hr at 06/28/24 0621 12.5 mL/kg/hr at 06/28/24 0621    vasopressin 1 unit/mL MAX Conc (PITRESSIN) infusion  1.8 Units/hr Intravenous Continuous Velez Reyes, German, MD   Stopped at 06/28/24 1040

## 2024-06-28 NOTE — PROGRESS NOTES
CRRT STATUS NOTE    DATA:  Time:  4:58 PM  Pressures WNL:  YES  Filter Status:  WDL    Problems Reported/Alarms Noted:  none    Supplies Present:  YES    ASSESSMENT:  Patient Net Fluid Balance:  -506cc since midnight  Vital Signs:   Temp: 98.8  F (37.1  C) Temp src: Oral   Pulse: 89   Resp: 26 SpO2: 93 %        Labs:  Last Comprehensive Metabolic Panel:  Lab Results   Component Value Date     06/28/2024    POTASSIUM 3.4 06/28/2024    CHLORIDE 110 (H) 06/28/2024    CO2 21 (L) 06/28/2024    ANIONGAP 8 06/28/2024     (H) 06/28/2024    BUN 29.3 (H) 06/28/2024    CR 0.82 06/28/2024    GFRESTIMATED 81 06/28/2024    CHELSEY 6.9 (L) 06/28/2024        Goals of Therapy:  I=O    INTERVENTIONS:   none    PLAN:  Contact resource RN with any questions or concerns

## 2024-06-28 NOTE — PLAN OF CARE
Goal Outcome Evaluation:      Plan of Care Reviewed With: patient, spouse    Overall Patient Progress: improving    Outcome Evaluation: Tolerated PST, worked with PT, and are weaning down sedation.      ICU End of Shift Summary. See flowsheets for vital signs and detailed assessment.    Changes this shift: Patient was placed on PS 45%/ 5/5 and quickly required more oxygen, ended at 80% 10/7 which was sustained for ~1 1/2 hour. Vaso off for ~ 4 hours. Fentanyl was reduced to help wean off sedation. Dex was weaned down but had to be titrated up due to increased anxiety.    Plan: improve sleep hygiene to improve delirium. Continue to work towards weaning off sedation and extubation. Work with therapy.          Problem: Restraint, Nonbehavioral (Nonviolent)  Goal: Absence of Injury/Harm  Outcome: No Change     Patient continues to require soft bilateral unsecured mitts in place for safety. Pt remains intermittently agitated/restless, not redirectable/not following consistent commands. Range of motion performed with no signs of injury throughout shift. Continue to reassess the need for restraints.

## 2024-06-29 NOTE — PLAN OF CARE
Major Shift Events: RASS 0 to -1. Opens eyes spontaneously; follows commands and GALLOWAY; very weak. Able to nod/yes no to simple questions. Denies pain. Very delirious and tired today; denies hallucinations. Precedex gtt and fentanyl gtt for pain and sedation. Afebrile, in and out of Afib and SR, HR 70s-90s. Phenylephrine gtt and vasopressin gtt to maintain MAP goal > 65. PST of 45% 5/5 tolerated for 12 minutes this morning, then switched to PS 45% 10/7 and tolerated for another 15 minutes (approximately 25 minutes total); pt became very anxious during PST, tachypnea with RR 40s-50s, Afib with RVR, HR up to 150 briefly, c/o shortness of breath and visibly distressed; otherwise tolerating CMV 45%/320/18/7; high PIPs mid 30s -40s; MICU 2 team aware. Scant thin secretions inline ETT and orally. TF stopped for MRI; restarted trickle feeds through ND at 15 mL/hr (goal 30 mL/hr) with standard FWF at 1500. Aneuric; on CRRT; 4K+ bath; goal 0-100mL per hour;  negative 400 mLs today at the end of this shift; heparin through circuit at 500 units/hr; CRRT stopped approximately 1150 to 1555 for MRI and CT. Large amounts of loose, watery BM in rectal tube, approximately 600mLs out this shift. Dusky/cyanotic fingers and toes; blanchable redness of sacrum coccyx and buttocks/IT. L arm fistula.    Lines  -R internal jugular trialysis  -R femoral arterial line  -2 R PIVs    Drips  -Fentanyl 25 mcg  -Precedex 0.5 mcg/kg/hr  -on and off phenylephrine      Plan: PS trials as able. Advance TF back to goal rate of 30mL/hr. Notify primary team of changes in POC.  For vital signs and complete assessments, please see documentation flowsheets.      Goal Outcome Evaluation:      Plan of Care Reviewed With: patient, spouse    Overall Patient Progress: no changeOverall Patient Progress: no change    Outcome Evaluation: Tolerated PST for 25 minutes. MRI and CT completed today.

## 2024-06-29 NOTE — PROGRESS NOTES
Brief GI Note    Patient had some abdominal pain.  CBD is dilated but MRCP with no stone in the bile duct. Transaminases are normal, isolated elevated direct bili with normal total bilirubin.   Recommend general surgery consult for cholecystectomy for symptomatic cholelithiasis if patient has ongoing RUQ abdominal pain.   The inpatient GI service will sign off.  Please call with question.     Discussed with Dr. Lopez.    Hilda Ko (Mauricio Quezada), DO MS  Gastroenterology Fellow  [facility], [place]  Text Page

## 2024-06-29 NOTE — PROGRESS NOTES
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 Nephrology consulted for management of HD while admitted.      Interval History :   -Mrs Blue continues on CRRT, slightly net negative yesterday, ordered for net even today while we try to wean some pressor support.   -On exam has minimal LE edema, is on FiO2 45% and is up from her dry weight,Wt today at 54.4kg. I agree that it is reasonable to pull fluid today to get her closer to dry weight as tolerated (she does remain on pressors at the time of my eval)  -Na and K acceptable  -Calcium corrects up to normal for albumin  -Phos 3.9    Assessment & Recommendations:   ESRD-ESKD: pt dialyzes MWF at [facility] (ph [phone], f [phone]) with Dr. Glasgow. Run time: 3.5 hrs. EDW 52.5 kg. Access: L AVF. +heparin 1,500u bolus.                  -Appreciate team placing tri-alysis line to run norepi and will plan for CRRT, 4k baths, planning 0-100cc/h net negative to get her closer to EDW today, 500u heparin/h through circuit.                -No need for new dialysis consent, continuing long term HD for ESRD.                -L AVF with remote hx of needing intervention, no issue recently.      Outpt Rx:       Volume- Continue with I's = O's today and monitor weight trends/pressor needs.      Pulm-Admitted with hypercapnic resp failure, has post obstructive PNA. ID involved, getting bronch.  Currently on Zosyn, bactrim, Posaconazole, Azithromycin and Vanco x1 dose so far.       Electrolytes-Stable on CRRT, 4k baths, checking labs BID.      BMD-Stable on CRRT, has replacements ordered with CRRT  "order set.      Anemia-Hgb ~8 and drifting, last PRBC's 6/26 on venofer 50mg weekly but will hold while treating for infection. On Mircera 60mcg f2zlsdl, will cover with short term Epo while admitted when stable enough for HD.       Nutrition-Novasource renal started      Time spent: 35 minutes on this date of encounter for chart review, physical exam, medical decision making and co-ordination of care.       Physical Exam:   I/O last 3 completed shifts:  In: 2232.93 [I.V.:1106.93; NG/GT:496]  Out: 2342.9 [Other:1767.9; Stool:575]   BP (!) 156/104   Pulse (!) 129   Temp 98.6  F (37  C) (Axillary)   Resp 19   Ht 1.6 m (5' 3\")   Wt 54.4 kg (119 lb 14.9 oz)   LMP 06/07/2014 (Exact Date)   SpO2 90%   BMI 21.24 kg/m       GENERAL APPEARANCE: Intubated but alert.    EYES: No scleral icterus  Pulmonary: lungs Rhonchi to auscultation with equal breath sounds bilaterally  CV: Regular rhythm, normal rate   - Edema none  GI: soft, nontender, normal bowel sounds  MS: no evidence of inflammation in joints, no muscle tenderness  : No Basilio  SKIN: no rash, warm, dry  NEURO: No focal deficits    Labs:   All labs reviewed by me  Electrolytes/Renal -   Recent Labs   Lab Test 06/29/24  0820 06/29/24  0405 06/29/24  0359 06/28/24  1955 06/28/24  1646 06/28/24  0432 06/28/24  0427   NA  --   --  138  --  138  --  139   POTASSIUM  --   --  3.5  --  3.8  --  3.4   CHLORIDE  --   --  107  --  106  --  110*   CO2  --   --  22  --  21*  --  21*   BUN  --   --  28.2*  --  30.2*  --  29.3*   CR  --   --  0.88  --  0.89  --  0.82   * 192* 199*   < > 206*   < > 229*   CHELSEY  --   --  7.5*  --  7.6*  --  6.9*   MAG  --   --  2.4*  --  2.4*  --  2.2   PHOS  --   --  3.9  --  4.2  --  4.2    < > = values in this interval not displayed.       CBC -   Recent Labs   Lab Test 06/29/24  0359 06/28/24  0427 06/27/24  0348   WBC 4.8 4.4 5.2   HGB 7.8* 8.0* 8.4*   * 98* 112*       LFTs -   Recent Labs   Lab Test 06/29/24  0359 " 06/28/24  1646 06/28/24  0427 06/27/24  1636 06/27/24  0348   ALKPHOS 146  --  122  --  135   BILITOTAL 0.8  --  0.7  --  1.1   ALT 36  --  32  --  38   AST 22  --  16  --  20   PROTTOTAL 4.7*  --  4.3*  --  4.6*   ALBUMIN 2.5* 2.7* 2.3*   < > 2.4*    < > = values in this interval not displayed.       Iron Panel -   Recent Labs   Lab Test 02/03/21  0415 12/13/18  1033 08/01/18  0921   IRON 51 16* 93   IRONSAT 36 7* 37   YOLA  --  302* 571*           Current Medications:  Current Facility-Administered Medications   Medication Dose Route Frequency Provider Last Rate Last Admin    acetylcysteine (MUCOMYST) 10 % nebulizer solution 4 mL  4 mL Inhalation BID Velez Reyes, German, MD   4 mL at 06/29/24 0819    azithromycin (ZITHROMAX) tablet 250 mg  250 mg Oral or Feeding Tube Daily Velez Reyes, German, MD   250 mg at 06/29/24 0849    B and C vitamin Complex with folic acid (NEPHRONEX) liquid 5 mL  5 mL Per Feeding Tube Daily Awa Watt MD   5 mL at 06/29/24 0849    calcium carbonate (TUMS) chewable tablet 500 mg  500 mg Oral Once per day on Sunday Tuesday Thursday Saturday Josesito Pratt MD   500 mg at 06/29/24 0854    chlorhexidine (PERIDEX) 0.12 % solution 15 mL  15 mL Mouth/Throat Q12H Chio Cortez MD   15 mL at 06/29/24 0849    epoetin alisha-epbx (RETACRIT) injection 4,000 Units  4,000 Units Intravenous Once per day on Monday Wednesday Friday Velez Reyes, German, MD   4,000 Units at 06/28/24 0812    fludrocortisone (FLORINEF) half-tab 0.05 mg  0.05 mg Oral or Feeding Tube Daily Velez Reyes, German, MD   0.05 mg at 06/29/24 0849    hydrocortisone sodium succinate PF (solu-CORTEF) injection 25 mg  25 mg Intravenous BID Velez Reyes, German, MD   25 mg at 06/29/24 0849    insulin aspart (NovoLOG) injection (RAPID ACTING)  1-12 Units Subcutaneous Q4H Edin Davis MD   1 Units at 06/29/24 0848    insulin NPH injection 8 Units  8 Units Subcutaneous BID Velez Reyes, German, MD        levalbuterol  (XOPENEX) neb solution 0.63 mg  0.63 mg Nebulization 4x Daily Gareth Mosquera MD   0.63 mg at 06/29/24 0818    linezolid (ZYVOX) tablet 600 mg  600 mg Oral or Feeding Tube Q12H Velez Reyes, German, MD   600 mg at 06/29/24 0026    [Held by provider] magnesium chloride CR tablet 1,070 mg  1,070 mg Oral Once per day on Sunday Tuesday Thursday Saturday Josesito Pratt MD        melatonin tablet 3 mg  3 mg Oral or Feeding Tube At Bedtime Velez Reyes, German, MD   3 mg at 06/28/24 2012    metoprolol tartrate (LOPRESSOR) tablet 25 mg  25 mg Oral BID Velez Reyes, German, MD   25 mg at 06/29/24 0849    micafungin (MYCAMINE) 150 mg in sodium chloride 0.9 % 100 mL intermittent infusion  150 mg Intravenous Q24H Chio Cortez  mL/hr at 06/29/24 0026 150 mg at 06/29/24 0026    minocycline (MINOCIN) 100 mg in sodium chloride 0.9 % 100 mL intermittent infusion  100 mg Intravenous Q12H Kajal Chong APRN  mL/hr at 06/29/24 0127 100 mg at 06/29/24 0127    montelukast (SINGULAIR) tablet 10 mg  10 mg Oral At Bedtime Velez Reyes, German, MD   10 mg at 06/28/24 2116    pantoprazole (PROTONIX) 2 mg/mL suspension 40 mg  40 mg Per Feeding Tube Daily Josesito Pratt MD   40 mg at 06/28/24 1222    posaconazole (NOXAFIL) DR tablet TBEC 300 mg  300 mg Per Feeding Tube Daily Josesito Pratt MD   300 mg at 06/28/24 1224    [Held by provider] predniSONE (DELTASONE) tablet 5 mg  5 mg Oral Daily Velez Reyes, German, MD   5 mg at 06/21/24 0822    protein modular (PROSOURCE TF20) packet 1 packet  1 packet Per Feeding Tube BID Awa Watt MD   1 packet at 06/29/24 0849    QUEtiapine (SEROquel) tablet 150 mg  150 mg Oral or Feeding Tube At Bedtime Velez Reyes, German, MD   150 mg at 06/28/24 2102    sodium chloride (NEBUSAL) 3 % neb solution 3 mL  3 mL Nebulization BID Velez Reyes, German, MD   3 mL at 06/28/24 2040    sodium chloride (PF) 0.9% PF flush 10 mL  10 mL Intracatheter Q8H Kajal Chong APRN CNP   10 mL at  06/29/24 0845    sodium chloride (PF) 0.9% PF flush 10 mL  10 mL Intracatheter Q8H Kajal Chong APRN CNP   10 mL at 06/29/24 0845    sulfamethoxazole-trimethoprim (BACTRIM) 400-80 MG per tablet 1 tablet  1 tablet Oral or Feeding Tube Daily Josesito Pratt MD   1 tablet at 06/28/24 2012    tacrolimus (GENERIC) suspension 0.6 mg  0.6 mg Oral or Feeding Tube BID IS Cindy Mcadams PA-C   0.6 mg at 06/29/24 0849    valGANciclovir (VALCYTE) solution 450 mg  450 mg Per Feeding Tube Daily Rufina Huff CNP   450 mg at 06/29/24 0849    Vitamin D3 (CHOLECALCIFEROL) tablet 50 mcg  50 mcg Oral Once per day on Monday Wednesday Friday Velez Reyes, German, MD   50 mcg at 06/28/24 0851     Current Facility-Administered Medications   Medication Dose Route Frequency Provider Last Rate Last Admin    dexmedeTOMIDine (PRECEDEX) 4 mcg/mL in sodium chloride 0.9 % 100 mL infusion  0.1-1.2 mcg/kg/hr (Dosing Weight) Intravenous Continuous Chio Cortez MD 6.4 mL/hr at 06/29/24 0900 0.5 mcg/kg/hr at 06/29/24 0900    dextrose 10% infusion   Intravenous Continuous PRN Velez Reyes, German, MD        dextrose 10% infusion   Intravenous Continuous PRN Awa Watt MD        dialysate for CVVHD & CVVHDF (Phoxillum BK4/2.5)  12.5 mL/kg/hr CRRT Continuous Jonna Rodas  mL/hr at 06/29/24 0355 12.5 mL/kg/hr at 06/29/24 0355    fentaNYL (SUBLIMAZE) infusion   mcg/hr Intravenous Continuous Velez Reyes, German, MD 0.5 mL/hr at 06/29/24 0900 25 mcg/hr at 06/29/24 0900    heparin (porcine) 20,000 units in 20 mL ANTICOAGULANT infusion (syringe from pharmacy)  500 Units/hr CRRT Continuous Hardy Tran APRN CNS 0.5 mL/hr at 06/29/24 0900 500 Units/hr at 06/29/24 0900    phenylephrine (DANDY-SYNEPHRINE) 50 mg in NaCl 0.9 % 250 mL infusion  0.1-6 mcg/kg/min (Dosing Weight) Intravenous Continuous Chio Cortez MD   Stopped at 06/29/24 0911    POST-filter replacement solution for CVVHD & CVVHDF  (Phoxillum BK4/2.5)   CRRT Continuous Jonna Rodas  mL/hr at 06/28/24 1654 New Bag at 06/28/24 1654    PRE-filter replacement solution for CVVHD & CVVHDF (Phoxillum BK4/2.5)  12.5 mL/kg/hr CRRT Continuous Jonna Rodas  mL/hr at 06/29/24 0355 12.5 mL/kg/hr at 06/29/24 0355    vasopressin 1 unit/mL MAX Conc (PITRESSIN) infusion  1.8 Units/hr Intravenous Continuous Velez Reyes, German, MD 1.8 mL/hr at 06/29/24 0900 1.8 Units/hr at 06/29/24 0900

## 2024-06-29 NOTE — PROGRESS NOTES
MEDICAL ICU PROGRESS NOTE  06/29/2024      Date of Service (when I saw the patient): 06/29/2024    ASSESSMENT: Kecia Blue is a 63 year old female with PMH ILD s/p bilateral lung transplant (2018) c/b recurrent right bronchial stenosis s/p repeat balloon dilation/stenting, repeat opportunistic pneumonia (PJP 2021, aspergillus/stenotrophomonas 11/2023) and viremias (EBV, CMV), and ESRD 2/2 tacrolimus toxicity on HD who was admitted on 6/18/2024 for acute hypercapnic respiratory failure 2/2 tracheal stenosis and pneumonia. Hospital course complicated by hypotension, delirium, and prolonged respiratory failure    Changes today  - MRCP pending for CBD dilation, Holding Tube Feeds 4 hours prior   - NPH to 8U BID with steroids   - Midodrine 15mg TID  - SBT/PST BID at 12/7  - Goal net negative -500ml  - CT Chest for evaluation of post-obst PNA    PLAN:    Neuro:  # Pain   - Acetaminophen 325mg q4H  - Fentanyl gtt with boluses PRN    # Sedation  - RAAS goal 0 to -1  - Precedex gtt - wean as able  - Daily sedation vacation    # Toxic metabolic encephalopathy, improving  Presented with 2-3 days of lethargy, decreased appetite, and fatigue. Previously has improved with BiPAP/intubation. Ongoing lethargy in setting of sepsis and delirium in the setting of high-dose steroid therapy. Has been hallucinating, reportedly seeing snakes (6/28). Plan to wean down on precedex gtt (see above).  - Quetiapine 50 mg at bedtime  - Ramalteon for sleep   - Weaning stress dose steroids as tolerated    Pulmonary:  # Acute on chronic hypoxic hypercapnic respiratory failure  # HAP, Stenotrophomonas maltophilia, Enterococcus faecalis, and Candida guilliermondi complex  # Severe pulmonary edema  # Acute respiratory distress syndrome, resolving  Presented to the ED on 6/18/2024 with acute on chronic hypoxic hypercapnic respiratory failure. Transferred to MICU and intubated on overnight on 6/18. Workup significant for Stenotrophomonas,  Enterococcus, and Candida pneumonia. Course was c/b progression to ARDS (6/21-22) with elevated plateau pressures. Pt remain intubated d/t ongoing pulmonary edema iso ESRD on iHD. CXR (6/26) with similar pulmonary opacities compared to 6/24, left > right.    - Antibiotics, as below  - Volume management with CRRT, as below  - Mechanical ventilation, wean as able, meets extubation readiness  - Daily SBT (AM and PM)  - Did not tolerate today due to anxiety and Afib with RVR  - Pulmonary toilet  - Mucomyst BID  - Hypertonic saline BID, alternate with mucomyst  - Albuterol neb QID  - Did not tolerate volera    Vent Mode: CMV/AC  (Continuous Mandatory Ventilation/ Assist Control)  FiO2 (%): 45 %  Resp Rate (Set): 18 breaths/min  Tidal Volume (Set, mL): 320 mL  PEEP (cm H2O): 7 cmH2O  Pressure Support (cm H2O): 7 cmH2O  Resp: 25    # Recurrent right middle bronchus stenosis s/p dilation and stent (6/20/2024)  Has history (bronchoscopy 2/15/24) demonstrating narrowing of right mainstem transplant anastomosis and bronchus intermedius. CT chest (6/18/2024) and bronchoscopy (6/19/2024) demonstrated occlusion of right middle bronchus. With concern for post-obstructive pneumonia, interventional pulmonology was consulted, and completed bronchoscopy (6/20/2024) with tissue debulking, right middle bronchus balloon dilation to 11 mm, stent placement in right main bronchus, and bifurcating cast placement in right upper bronchus. Plan for saline nebs BID and outpatient bronchoscopy in 6 weeks.   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  - Transplant pulm consult, appreciate recs  - Tacrolimus level (6/27) was 4.5 (low)  - PTA nebs  - Fluticasone-viilanterol (breo ellipta) every day - HOLD with respiratory infection  - Montelukast every day    - Immunosuppressants per Tx Pulm:   - PTA Tacrolimus (dosing per tx pulm)  - PTA Prednisone 5 mg daily - HOLD with stress dose steroids  - PTA azathioprine - HOLD per Transplant pulmonology with repeat infections    Cardiovascular:  # Septic shock, persistent   On 6/19/2024, developed sepsis (hypotension 80s/50s, tachypnea 24) in the setting of stenotrophomonas pneumonia/enterococcus faecium VRE UTI. Etiology of shock likely distributive iso sepsis; less likely hypovolemic (not pulling volumes with CRRT), medication-associated (weaned off of propofol gtt) or obstructive (low suspicion for PE, echo 6/19 without evidence of cardiac dysfunction). Has been on and off pressors during course of ICU stay.    - IV pressors, wean as able   - Phenylephrine gtt   - Vasopressin gtt  - MAP goal >65  - Stress dose steroids  - Hydrocortisone 50mg q6H (6/21-6/26); 50 mg BID (6/26-6/28); 25 mg BID (6/28-x)  - Fludrocortisone 0.1 mg qday (6/21-6/28); 0.05 mg qday (6/28-x)  - If hemodynamically stable, consider transitioning to PTA prednisone at increased 40 mg daily (then titrate to 5 mg)  - Added midodrine 15mg TID for pressor weaning     # Demand Ischemia, resolved    Presented with elevated troponin (peak 499). Not associated with chest pain or hypoxia. EKG without ST elevations/depressions or T wave inversions. Suspect demand ischemia in the setting of sepsis. Will continue to monitor symptoms and continuous telemetry.      # Paroxysmal A-Fib  # Pulmonary Hypertension   History of pulmonary hypertension (45 mmHg, echo 10/2023) in the setting of ILD and paroxysmal afib on PTA metoprolol tartrate BID. Not on anticoagulation for afib.   - PTA metoprolol tartrate 25mg BID     GI/Nutrition:  # Nutrition  NJT placed (6/19/2024).   - Nutrition consult  - Tube feeds 30ml/hr; at goal    # Diarrhea, resolved  On 6/21, had 8 bowel movements. Occurred in the setting of scheduled bowel regimen and starting new antibiotics (meropenem,  levofloxacin). C diff negative.   - Bowel regimen  - Miralax daily PRN   - Senna BID    # C/f cholecystitis vs choledocholithiasis/distal biliary obstruction  # Cholelithiasis with gallbladder wall thickening  # Elevated bili  # Elevated alk phos, resolved  Admission with elevated alkaline phosphatase (237) with elevated GGT consistent with hepatic etiology, resolved. On 6/27, pt endorsed RUQ pain with palpation and had elevated body temps (99-100F) on CRRT. LFTs and bili (6/27) was also noted have been trending upwards over the past few days. Labs on 6/28 improved, but RUQ U/S (6/28) showed gallbladder wall thickening measuring up to 6 mm with shadowing gallstones present and pericholecystic fluid; common bile duct dilated to 11 mm. Findings c/w cholelithiasis with c/f cholecystitis vs choledocholithiasis/distal biliary obstruction. Lipase negative   - Consult GI, appreciate recs  - MRCP today, pending     Renal/Fluids/Electrolytes:  # ESRD on iHD (M,W,F)  History of ESRD 2/2 Tacrolimus toxicity on HD (MWF). With soft blood pressures, placed RIJ (6/20/2024) and started CRRT (6/20/2024).  - Nephrology consult  - CRRT: aim for net negative 500ml  - Discussed with nephrology about transitioning to goal of I=O on Sat (6/29)  - Renally-dosed medications  - RN electrolyte replacement    Endocrine:  # Risk for hyperglycemia with stress-dose steroids  Given high BG in high 200s on stress dose steroids and fludrocortisone, started on insulin gtt on 6/25. Overnight on 6/26, BG dropped to 77, pt given IV bolus of D50W and sugars corrected. Hypoglycemic episode likely iso titrating down the stress dose steroids. S/p insulin gtt (6/25-27).   - HDSSI  - NPH 8U BID     ID:  # HAP, Stenotrophomonas maltophilia, Enterococcus faecalis, Aspergillus, and Candida guilliermondi complex  Presented to the ED on 6/18/2024 with SOB and hypercapnic respiratory failure. Found on bronchoscopy (6/19) to have RML obstruction by narrowing  bronchus intermedius as well as copious mucopurulent secretions/mucus plugging. Previously treated for Stenotrophonomas/aspergillus pneumonia in 11/2023 with subsequent sputum culture (2/2024) negative for both Stenotrophonomas/Aspergillus. Etiology likely repeat Stenotrophomonas infection vs opportunistic infection from colonized microbe.   - Workup  - 6/18 sputum cx: Stenotrophomonas maltophilia (ceftazidime and levofloxacin R)  - 6/19 BAL cx: Stenotrophomonas maltophilia, Enterococcus faecalis (gentamicin R), Aspergillus (speciation in process)  - 6/21 BAL cx: Stenotrophomonas maltophilia and Enterococcus faecalis VRE  - 6/24 sputum cx: Stenotrophomonas maltophilia, Enterococcus faecalis VRE, and Candida guilliermondi complex  - Transplant pulmonology consult, appreciate recs  - Transplant ID consult, appreciate recs  - Present antibiotics  - IV minocycline (6/20-x)  - PO linezolid (6/25-x)  - IV micafungin (6/25-x) - until Aspergillus speciation results  - Past antibiotics  - S/p ceftazidime (6/25-6/28)    - S/p vancomycin (6/18-6/20)   - S/p zosyn (6/18-21)  - S/p Daptomycin (6/21-6/23)  - S/p Meropenem (6/21-6/24)  - S/p Levofloxacin (6/21-6/23)    # Pyuria, Enterococcus faecium VRE and  ESBL E. Coli   Asymptomatic. With progressive IV pressor needs, obtained broad infectious workup which demonstrated UCx (6/19/2024) with Enterococcus faecium VR and ESBL E. Coli. S/p Daptomycin (6/21-6/23) and Meropenem (6/21-6/24).    # Hx Aspergillus PNA (11/2023)  # Hx PJP PNA (1/2021)  # Hx CMV viremia, recurrent  # Hx EBV viremia  - Transplant ID consult, appreciate recs  PTA posaconazole (Treatment for hx of aspergillus PNA)  PTA azithromycin 250mg qd (CLAD ppx)  PTA bactrim (PJP ppx)     Hematology:    # Acute on Chronic Normocytic Anemia  # Thrombocytopenia, chronic  History of chronic anemia in the setting of kidney disease (baseline Hgb 10-11). Upon admission, Hgb 9. May be bone marrow suppression in the setting of  chronic illness; potential contribution by blood loss with CRRT filter changes (~150-200c). Peripheral smear (6/18/2024) without evidence of schistocytes.  - Monitor CBC    Musculoskeletal:  - PT / OT consult     Skin:  - No acute concerns.     General Cares/Prophylaxis:    DVT Prophylaxis: Heparin SQ  GI Prophylaxis: PPI  Restraints: None  Family Communication: , will update over phone or in person  Code Status: Full Code     Lines/tubes/drains:  - PIV x2, midline  - RIJ  - A line  - NGT  - ETT    Disposition:  - Medical ICU     Patient seen and findings/plan discussed with medical ICU staff, Dr. Jean.    Germán L. Vélez Reyes, MD, PhD  Internal Medicine Resident       Clinically Significant Risk Factors              # Hypoalbuminemia: Lowest albumin = 2.2 g/dL at 6/21/2024  4:26 PM, will monitor as appropriate   # Thrombocytopenia: Lowest platelets = 98 in last 2 days, will monitor for bleeding                 #Precipitous drop in Hgb/Hct: Lowest Hgb this hospitalization: 6.9 g/dL. Will continue to monitor and treat/transfuse as appropriate.      # Moderate Malnutrition: based on nutrition assessment    # Financial/Environmental Concerns: none                  ====================================  INTERVAL HISTORY:   PST stopped this morning due to anxiety and AFIb with RVR. No other concerns.  at bedside and updated.       OBJECTIVE:   1. VITAL SIGNS:   Temp:  [97.5  F (36.4  C)-98.8  F (37.1  C)] 98.6  F (37  C)  Pulse:  [] 74  Resp:  [18-44] 25  MAP:  [60 mmHg-100 mmHg] 80 mmHg  Arterial Line BP: (102-167)/(39-66) 126/56  FiO2 (%):  [45 %] 45 %  SpO2:  [89 %-100 %] 100 %  Vent Mode: CMV/AC  (Continuous Mandatory Ventilation/ Assist Control)  FiO2 (%): 45 %  Resp Rate (Set): 18 breaths/min  Tidal Volume (Set, mL): 320 mL  PEEP (cm H2O): 7 cmH2O  Pressure Support (cm H2O): 7 cmH2O  Resp: 25    2. INTAKE/ OUTPUT:   I/O last 3 completed shifts:  In: 2232.93 [I.V.:1106.93; NG/GT:496]  Out: 2342.9  [Other:1767.9; Stool:575]    3. PHYSICAL EXAMINATION:  General: Laying in bed, NAD   HEENT: Atraumatic, moist mucous membranes   Pulm/Resp: Improved coarse breath sounds. Expiratory wheezing. Good air movement throughout.   CV: RRR, no m/r/g   Abdomen: Soft, non-distended, non-tender to palpation.   Ext: Trace bilateral LE edema, pulses 2+ radial, pedal  Incisions/Skin: No rashes or lesions  Neuro: Awake, follows 1-step and 2-step commands, moving all extremities    4. LABS:   Arterial Blood Gases   Recent Labs   Lab 06/26/24  0310 06/25/24  1209 06/24/24  2126 06/24/24  1653   PH 7.35 7.29* 7.33* 7.30*   PCO2 44 51* 45 50*   PO2 137* 60* 133* 80   HCO3 24 24 24 25     Complete Blood Count   Recent Labs   Lab 06/29/24  0359 06/28/24  0427 06/27/24  0348 06/26/24  1200 06/26/24  0310   WBC 4.8 4.4 5.2  --  3.0*   HGB 7.8* 8.0* 8.4* 8.8* 6.9*   * 98* 112*  --  86*     Basic Metabolic Panel  Recent Labs   Lab 06/29/24  1112 06/29/24  1006 06/29/24  0820 06/29/24  0405 06/29/24  0359 06/28/24  1955 06/28/24  1646 06/28/24  0432 06/28/24  0427 06/27/24 2000 06/27/24  1636   NA  --   --   --   --  138  --  138  --  139  --  138   POTASSIUM  --   --   --   --  3.5  --  3.8  --  3.4  --  3.6   CHLORIDE  --   --   --   --  107  --  106  --  110*  --  107   CO2  --   --   --   --  22  --  21*  --  21*  --  20*   BUN  --   --   --   --  28.2*  --  30.2*  --  29.3*  --  32.6*   CR  --   --   --   --  0.88  --  0.89  --  0.82  --  0.94   * 145* 162* 192* 199*   < > 206*   < > 229*   < > 253*    < > = values in this interval not displayed.     Liver Function Tests  Recent Labs   Lab 06/29/24  0359 06/28/24  1646 06/28/24  0427 06/27/24  1636 06/27/24  0348 06/26/24  1557 06/26/24  0310   AST 22  --  16  --  20  --  22   ALT 36  --  32  --  38  --  38   ALKPHOS 146  --  122  --  135  --  134   BILITOTAL 0.8  --  0.7  --  1.1  --  0.8   ALBUMIN 2.5* 2.7* 2.3* 2.6* 2.4*   < > 2.6*    < > = values in this interval not  displayed.     Coagulation Profile  No lab results found in last 7 days.      5. RADIOLOGY:   No results found for this or any previous visit (from the past 24 hour(s)).

## 2024-06-29 NOTE — PROGRESS NOTES
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  The patient was admitted on 24 for progressive hypoxia with dyspnea and worsening hypercapnia in ED requiring intubation likely d/t post-obstructive PNA 2/2 right bronchus stenosis.  Bronch confirming severe stenosis of right anastomosis with extensive RLL plugging.  IP bronch () with laser tissue debulking RMB stenosis, airway balloon dilation of RMB and BI, and placement of stent RMB to BI and bifurcating stent in RUL.  S/p volume resuscitation and transition to CRRT  for hypotension.  Increased agitation/delirium on  with increasing desaturation led to need for increased sedation.  Recurrence of paroxysmal afib with RVR first noted .  Remains intubated with pulmonary edema on imaging and septic shock requiring pressor support, poor tolerance of PST.        Today's recommendations:    Ventilator weaning per ICU-continues to fail PS trials-Plan to rpt CXR and if persistent abnormality, a CT Chest wo contrast given poor PS tolerance.  If difficult to wean in the next 48-72 hours, will need to discuss with family re: tracheostomy and goals of care  Consider rpt bronchoscopy (IP) given suboptimal PST performance ( ideally repeat before committing to  bronchoscopy)  Tacrolimus dose increased today as below.  Prednisone chronic dose on hold for stress-dose steroids per MICU, resume if hydrocortisone <20 mg daily  Continue Posaconazole (long term med), micafungin 150 mg daily per transplant ID  CMV weekly monitoring (qTuesday) and VGCV ppx given stress dose steroids with tentative plan for 3 weeks beyond completion of steroid course.  Wean off pressors as tolerated, currently on Vasopressin  Noted plans for MRCP for dilated CBD-will follow  Volume removal with CRRT as able per nephrology-currently running even.     Yonny Orona MD [facility]  Associate Professor of Medicine  Division of Pulmonary, Allergy & Critical Care   [facility]  [facility]       Septic shock:  Acute on chronic hypoxic/hypercapneic respiratory failure:  Post-obstructive multi-lobar PNA:  Right bronchial stenosis with RML collapse: Admitted with 2 weeks of progressive hypoxia, dyspnea, congested cough, and fatigue.  Chronic hypoxia with 2L NC, increased from 3 to 4L over this time with acute decompensation on day of admission associated with hypercapnia.  VBG 7/17/85 in ED s/p intubation.  IP bronch 2/15 s/p tissue debulking without stent placement.  Negative ID workup: respiratory panel, COVID, MRSA nares, PJP, Legionella, Strep pneumoniae, cocci, Histo, CrAg, fungitell x2, and A. galactomannan (blood).  IgG WNL.  Procal mildly elevated at 0.24 initially (then elevated to 1.77 6/20), LA normal, but febrile to 100.7.  TTE on admission grossly normal.  CT with RUL/RLL consolidative opacities, RML collapse, and narrowing of BI and distal RLL bronchus.  Started on norepi 6/19.  Bronch per MICU (6/19) with severe stenosis starting at right anastomosis, inspection with smaller scope revealing for extensive RLL mucous plugging.  Bronch per IP (6/20) with laser tissue debulking RMB stenosis, airway balloon dilation of RMB and BI, and placement of stent  RMB to BI and bifurcating stent in RUL.  Intermittent/low grade fevers persisting.  Remains on full vent support, very poor tolerance of PST.    - Bronch culture (6/19) with Steno, VSE. faecalis, Candida guilliermondii complex and Aspergillus, susceptibilities requested by transplant ID  - BAL (6/21) with Steno, VRE, and E. faecalis  - Sputum culture (6/24) with Steno, VRE, and Candida guilliermondii complex  - Blood cultures (6/24) NGTD  - IgG 6/19/.  - ABX: ceftazidime (6/25), linezolid (6/25), and minocycline (6/20, Steno) per transplant ID; s/p meropenem (6/21-6/24), daptomycin (6/21-6/24), levofloxacin (6/21-6/23), Zosyn (6/18-6/21), azithromycin 500 mg daily (6/18-6/20), and vancomycin (6/18)   - Nebs: levalbuterol QID, Mucomyst 10% BID alternating with 3% HTS (6/22), will need to be on NS nebs BID upon discharge per IP  - Vent management per MICU  - Stress dose steroids per MICU (started 6/21, decreased 6/26)  - Bronchoscopy in coordination with IP.     S/p bilateral sequential lung transplant (BSLT) for ILD:   Progressive CLAD: Most recent OP visit in February.  DSA negative 6/19 (last positive noted 2018).  Repeat ImmuKnow (6/19) 87, very low but IST adjustment deferred per Dr. Graham given acuity and steroids (after ImmuKnow level).  Repeat Prospera remains high at 2.3 on 6/19, may be artificially elevated with current infection, but also notably high at 2.42 on 2/14.  - Delay Prospera and ImmuKnow till full recovery from pneumonia  - PTA Singulair, azithromycin, and Breo inhaler (resume once extubated)     Immunosuppression:  On 2-drug IST since November d/t leukopenia and recurrent infections    - Tacrolimus 0.8 mg BID (increased 6/27).  Dose increased 6/29 .Goal level 8-10.    - Prednisone 5 mg daily --> held with stress dose steroids, resume if hydrocortisone <20 mg daily     Prophylaxis:   - Bactrim daily for PJP ppx (increased from prior qMWF on 6/20 given CRRT, monitor need to adjust pending  renal plan)     ID: H/o Actinomyces (10/17/23) and recurrent Steno (8/17/23 and 11/1/23).  - ABX and infectious work up as above     Aspergillus ochraceus:  H/o Aspergillus empyema:  S/p empyema drainage and ampho B instillation.  Previously on voriconazole, transitioned to posaconazole and managed by transplant ID as OP with last visit 3/13.  Calcified fungal elements remain with additional recurrent effusion (likely associated with fluid disturbances r/t iHD), but surgical intervention previously deferred d/t risk.  Posaconazole level 2.5 on 6/19.  - Posaconazole 300 mg daily chronically, micafungin 150 mg daily (6/25) per transplant ID     H/o CMV viremia: Recurrent CMV viremia historically.  VGCV ppx most recently stopped 4/12.  Recent CMV low positive at 46 (6/12), <35 (6/18), and 39 (6/25).  - CMV weekly monitoring (qTuesday) with steroid increase  - VGCV ppx (renally dosed) given stress dose steroids (6/24) with tentative plan for 3 weeks beyond completion of stress dose steroids      EBV viremia: S/p rituximab 12/13/23 x1 dose.  Most recent EBV negative 6/18.    Septic shock: Continues to be on pressors, wean vasopresson as tolerated    Dilated CBD,suspected acute cholecystitis- MRCP per GI     ESRD on iHD: Kidney evaluation started as OP.  On qMWF iHD schedule, no missed sessions.  Transitioned to CRRT on 6/20, management per renal and MICU with eventual goal for fluid removal as able.      VRE urine culture: Noted 6/19, >100k GNB and VRE faecium, ABX as above with management per MICU team and transplant ID.     We appreciate the excellent care provided by the MICU team.  Recommendations communicated via this note.  Will continue to follow along closely, please do not hesitate to call with any questions or concerns.          Subjective & Interval History:     Remains intubated on full vent support.  Poor tolerance of PST yesterday. Delirium +, not following commands consistently.    Review of Systems:  "    ROS as above, otherwise significantly limited due to intubation and sedation.    Physical Exam:     All notes, images, and labs from past 24 hours (at minimum) were reviewed.    Vital signs:  Temp: 98.6  F (37  C) Temp src: Axillary   Pulse: 74   Resp: 25 SpO2: 100 % O2 Device: Mechanical Ventilator Oxygen Delivery: 6 LPM Height: 160 cm (5' 3\") Weight: 54.4 kg (119 lb 14.9 oz)  I/O:   Intake/Output Summary (Last 24 hours) at 6/28/2024 0756  Last data filed at 6/28/2024 0700  Gross per 24 hour   Intake 2528.73 ml   Output 3650.1 ml   Net -1121.37 ml     Constitutional: Lying supine in bed, spouse at bedside, in no apparent distress.   HEENT: Eyes with pink conjunctivae, anicteric.  Orally intubated.  Left nare feeding tube.  PULM: Mildly diminished air flow bilaterally.  Coarse crackles with expiratory wheezes t/o.    CV: Normal S1 and S2.  RRR.  No murmur, gallop, or rub.    ABD: NABS, soft, nontender, nondistended.    MSK: Moves all extremities.    NEURO: Lethargic but arousable, minimally conversant.   SKIN: Warm, dry, fragile.   PSYCH: Restless.     Data:     LABS    CMP:   Recent Labs   Lab 06/29/24  1006 06/29/24  0820 06/29/24  0405 06/29/24  0359 06/28/24  1955 06/28/24  1646 06/28/24  0432 06/28/24  0427 06/27/24 2000 06/27/24  1636 06/27/24  0355 06/27/24  0348 06/26/24  0407 06/26/24  0310   NA  --   --   --  138  --  138  --  139  --  138  --  137   < > 138   POTASSIUM  --   --   --  3.5  --  3.8  --  3.4  --  3.6  --  3.6   < > 4.0   CHLORIDE  --   --   --  107  --  106  --  110*  --  107  --  106   < > 107   CO2  --   --   --  22  --  21*  --  21*  --  20*  --  22   < > 23   ANIONGAP  --   --   --  9  --  11  --  8  --  11  --  9   < > 8   * 162* 192* 199*   < > 206*   < > 229*   < > 253*   < > 255*  265*   < > 193*   BUN  --   --   --  28.2*  --  30.2*  --  29.3*  --  32.6*  --  34.4*   < > 39.1*   CR  --   --   --  0.88  --  0.89  --  0.82  --  0.94  --  0.90   < > 0.96*   GFRESTIMATED  " "--   --   --  74  --  73  --  81  --  69  --  72   < > 67   CHELSEY  --   --   --  7.5*  --  7.6*  --  6.9*  --  7.7*  --  7.8*   < > 7.6*   MAG  --   --   --  2.4*  --  2.4*  --  2.2  --  2.4*  --  2.3   < > 2.4*   PHOS  --   --   --  3.9  --  4.2  --  4.2  --  4.1  --  4.3   < > 4.0   PROTTOTAL  --   --   --  4.7*  --   --   --  4.3*  --   --   --  4.6*  --  4.6*   ALBUMIN  --   --   --  2.5*  --  2.7*  --  2.3*  --  2.6*  --  2.4*   < > 2.6*   BILITOTAL  --   --   --  0.8  --   --   --  0.7  --   --   --  1.1  --  0.8   ALKPHOS  --   --   --  146  --   --   --  122  --   --   --  135  --  134   AST  --   --   --  22  --   --   --  16  --   --   --  20  --  22   ALT  --   --   --  36  --   --   --  32  --   --   --  38  --  38    < > = values in this interval not displayed.     CBC:   Recent Labs   Lab 06/29/24  0359 06/28/24  0427 06/27/24  0348 06/26/24  1200 06/26/24  0310   WBC 4.8 4.4 5.2  --  3.0*   RBC 2.58* 2.64* 2.76*  --  2.28*   HGB 7.8* 8.0* 8.4* 8.8* 6.9*   HCT 25.1* 25.6* 27.1*  --  23.1*   MCV 97 97 98  --  101*   MCH 30.2 30.3 30.4  --  30.3   MCHC 31.1* 31.3* 31.0*  --  29.9*   RDW 16.8* 17.1* 17.3*  --  15.0   * 98* 112*  --  86*       INR: No lab results found in last 7 days.    Glucose:   Recent Labs   Lab 06/29/24  1006 06/29/24  0820 06/29/24  0405 06/29/24  0359 06/29/24  0020 06/28/24 1955   * 162* 192* 199* 199* 198*       Blood Gas:   Recent Labs   Lab 06/26/24  0310 06/25/24  1209 06/24/24  2126 06/23/24  1639 06/23/24  1143 06/23/24  0906   PHV  --   --   --   --  7.28* 7.28*   PCO2V  --   --   --   --  55* 53*   PO2V  --   --   --   --  35 35   HCO3V  --   --   --   --  25 25   LASHAUN  --   --   --   --  -1.6 -2.1   O2PER 50 50 60   < > 60 50  50    < > = values in this interval not displayed.       Culture Data No results for input(s): \"CULT\" in the last 168 hours.    Virology Data:   Lab Results   Component Value Date    FLUAH1 Not Detected 06/18/2024    FLUAH3 Not Detected " 06/18/2024    RF2094 Not Detected 06/18/2024    IFLUB Not Detected 06/18/2024    RSVA Not Detected 06/18/2024    RSVB Not Detected 06/18/2024    PIV1 Not Detected 06/18/2024    PIV2 Not Detected 06/18/2024    PIV3 Not Detected 06/18/2024    HMPV Not Detected 06/18/2024    HRVS Negative 01/24/2021    ADVBE Negative 01/24/2021    ADVC Negative 01/24/2021    ADVC Negative 12/23/2020    ADVC Negative 10/07/2019       Historical CMV results (last 3 of prior testing):  Lab Results   Component Value Date    CMVQNT <35 (A) 06/18/2024    CMVQNT Not Detected 03/05/2024    CMVQNT Not Detected 12/19/2023     Lab Results   Component Value Date    CMVLOG 1.6 06/25/2024    CMVLOG <1.5 06/18/2024    CMVLOG 2.5 02/14/2024       Urine Studies    Recent Labs   Lab Test 06/19/24  1214 01/24/21  1729   URINEPH 6.5 5.0   NITRITE Negative Negative   LEUKEST Large* Moderate*   WBCU >182* 34*       Most Recent [place] Pulmonary Function Testing (FVC/FEV1 only)  FVC-Pre   Date Value Ref Range Status   02/14/2024 0.85 L    12/19/2023 1.04 L    11/21/2023 0.85 L    10/24/2023 0.96 L      FVC-%Pred-Pre   Date Value Ref Range Status   02/14/2024 29 %    12/19/2023 36 %    11/21/2023 29 %    10/24/2023 33 %      FEV1-Pre   Date Value Ref Range Status   02/14/2024 0.80 L    12/19/2023 0.89 L    11/21/2023 0.78 L    10/24/2023 0.87 L      FEV1-%Pred-Pre   Date Value Ref Range Status   02/14/2024 34 %    12/19/2023 38 %    11/21/2023 33 %    10/24/2023 37 %        IMAGING    Recent Results (from the past 48 hour(s))   XR Chest Port 1 View    Narrative    Exam: XR CHEST PORT 1 VIEW, 6/26/2024 10:07 AM    Indication: hypoxia    Comparison: Chest x-ray 6/24/2024    Findings:     Portable frontal projection chest radiograph. Similar postsurgical  changes and support devices including sternotomy wires, right  bronchial stent, endotracheal tube, right IJ catheter, right midline  catheter and partially visualized enteric tube. Similar pulmonary  opacities,  left more than right, predominantly mid and lower zone and  cardiac silhouette. No acute airspace opacity. No discernible  pneumothorax. Possible trace effusions. Low lung volumes      Impression    Impression: Stable support devices. Similar pulmonary opacities, left  more than right, compared to prior radiograph from 6/24/2024,  representing edema/infection with atelectasis.    EDIE VILLA MD         SYSTEM ID:  [id number]

## 2024-06-29 NOTE — PROGRESS NOTES
CRRT STATUS NOTE    DATA:  Time:  6:00 AM  Pressures WNL:  YES  Filter Status:  WDL    Problems Reported/Alarms Noted:  None.    Supplies Present:  YES    ASSESSMENT:  Patient Net Fluid Balance:  Net -303 ml @ midnight, -9 ml @ 0600.  Vital Signs:  HR 81, /49, MAP 74  Labs:  K 3.5, Mg 2.4, Phos 3.9, iCa 4.5, Hgb 7.8, Plt 111  Goals of Therapy:  I=O    INTERVENTIONS:   None.    PLAN:  Continue to monitor circuit daily and change set q72 hours or PRN for clotting/clogging. Please call CRRT RN with any questions/problems.

## 2024-06-29 NOTE — PROGRESS NOTES
"CRRT STATUS NOTE    DATA:  Time: 1600  Pressures WNL:  YES  Filter Status:  WDL  Problems Reported/Alarms Noted:  none   Supplies Present:  YES      ASSESSMENT:  Patient Net Fluid Balance:  Yesterday, Net - 300mL; Today thus far net -300mL    Vital Signs: On only Vaso today - off at this time. Vented 45% PEEP 7.      Labs: stable on 4k baths.     Goals of Therapy:  net-0-100cc/hr as tolerated to get her closer to EDW of 52.5kg      Meeting goals of therapy.       INTERVENTIONS:   Recirculated for MRI and CT today. Filter clotted while away. New filter started.    None others needed at this time.       PLAN   Continue CRRT to get back to EDW while on pressors.    Contact \"CRRT RN 1\" On [employer] by dialing *[phone] with questions/concerns.     "

## 2024-06-29 NOTE — PLAN OF CARE
Major Shift Events:  Alert but lethargic at times. Confusion/intermittent agitation/restlessness. Precedex titrated throughout the night. Patient denied any hallucinations. Moves all extremities purposefully. Pupils round/equal/reactive. Able to nod yes/no to questions. Able to follow commands consistently. Writes on board to attempt to communicate needs. Denies pain. Fentanyl infusing @25 mcg. Afebrile. MAP goal maintained with vaso/merlene titrated throughout the night. R fem artline in place. SR 80s-100s to afib 90s-120s intermittently. CMV 45% 18/320/7. Pips 30s. Tachypnea 20-30s when resting and RR in the 40-50s when agitated/restless. Minimal secretions. TF@30. Rectal tube remains in place. CRRT.   Plan: Wean off pressors, sedation, mechanical ventilation   For vital signs and complete assessments, please see documentation flowsheets.

## 2024-06-30 NOTE — PROGRESS NOTES
Bilateral unsecured mitt restraints continued 6/30/2024    Clinical Justification: Pulling lines, pulling tubes, and pulling equipment  Less Restrictive Alternative: 1:1 patient care, Repositioning, Disguise equipment, Pain management, Alarm, Reorientation, De-escalation  Attending Provider Notified: Yes, Attending Provider's Name: MICU attending   New orders placed Yes  Length of Order: 1 Day      Nancy Lucero RN

## 2024-06-30 NOTE — PROGRESS NOTES
CRRT STATUS NOTE     DATA:  Time:  6:00 AM  Pressures WNL:  YES  Filter Status:  WDL     Problems Reported/Alarms Noted:  None.     Supplies Present:  YES     ASSESSMENT:  Patient Net Fluid Balance:  Net -462 ml @ midnight, -169 ml @ 0600.  Vital Signs:  HR 77, /44, MAP 64 Pressors include phenylephrine 0.5 mcg/kg/min  Labs:  K 3.9, Mg 2.4, Phos 4.8, iCa 4.6, Hgb 8.9, Plt 126  Goals of Therapy:  0-100 ml/hr net negative as tolerated to get her closer to EDW of 52.2 kg     INTERVENTIONS:   None.     PLAN:  Continue to monitor circuit daily and change set q72 hours or PRN for clotting/clogging. Please call CRRT RN with any questions/problems.

## 2024-06-30 NOTE — PROGRESS NOTES
"CRRT STATUS NOTE    DATA:  Time: 1900  Pressures WNL:  YES  Filter Status:  WDL  Problems Reported/Alarms Noted:  none  Supplies Present:  YES      ASSESSMENT:  Patient Net Fluid Balance:  Yesterday, Net - 460mL; Today thus far net -900mL.    Vital Signs: Only Phenyl off/on today. B/P: 114/50, T: 97.3, P: 69, R: 28. Vented PEEP 7.     Labs:  stable on 4k baths. K 3.8, ical 4.2 - 2g given.     Goals of Therapy:  Not updated today, but per Nephrology's order yesterday: Net -0-100mLto get closer to EDW of 52.5Kg. Per MICU in rounds, hoping for less, net -500-1L today.  Both goals met at this time.       INTERVENTIONS:    None needed at this time.       PLAN:   Continue CRRT to meet goals of therapy while pressors needed. Consider return to HD if pressors remain off.    Contact \"CRRT RN 1\" On [employer] with questions/concerns.     "

## 2024-06-30 NOTE — PLAN OF CARE
ICU End of Shift Summary. See flowsheets for vital signs and detailed assessment.    Changes this shift: Pt remains on CRRT, she is tolerating it well. Team want 500 to 1000 ml removed in 24 h. Pt is about 700 negative not. She is requiring small amount of pressor on and off. Sedation off at 1600. Pt seems A&O x4, she follows commends and denies hallucinations. Pt tolerated PS x2 today at 12/7 for about 40 min each time.   She has been on a chair for 3 h     Plan:  Continue to remove fluid as tolerated. Encourage PS.   Problem: Adult Inpatient Plan of Care  Goal: Plan of Care Review  Description: The Plan of Care Review/Shift note should be completed every shift.  The Outcome Evaluation is a brief statement about your assessment that the patient is improving, declining, or no change.  This information will be displayed automatically on your shift  note.  Outcome: Progressing  Flowsheets (Taken 6/30/2024 7252)  Outcome Evaluation: only on small amout of pressor, sedation off  Plan of Care Reviewed With: patient  Overall Patient Progress: improving

## 2024-06-30 NOTE — PROGRESS NOTES
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 Hospital course complicated by hypotension, delirium, and prolonged respiratory failure.    Changes today  - Continue Midodrine 15mg TID  - Phenylephrine turned off this morning.   - Vasopressin discontinued  - Discontinue Fludrocortisone  - SBT/PST BID at 12/7  - Goal net negative -500ml  - CT Chest for evaluation of post-obst PNA- completed, multifocal panlobular opacities bilaterally    PLAN:    Neuro:  # Pain   - Acetaminophen 325mg q4H  - Fentanyl gtt with boluses PRN    # Sedation  - RAAS goal 0 to -1  - Precedex gtt - wean as able  - Daily sedation vacation    # Toxic metabolic encephalopathy, improving  Presented with 2-3 days of lethargy, decreased appetite, and fatigue. Previously has improved with BiPAP/intubation. Ongoing lethargy in setting of sepsis and delirium in the setting of high-dose steroid therapy. Has been hallucinating, reportedly seeing snakes (6/28). Plan to wean down on precedex gtt (see above).  - Quetiapine 50 mg at bedtime  - Ramalteon for sleep   - Weaning stress dose steroids as tolerated  - Discontinue Fludrocortisone    Pulmonary:  # Acute on chronic hypoxic hypercapnic respiratory failure  # HAP, Stenotrophomonas maltophilia, Enterococcus faecalis, and Candida guilliermondi complex  # Severe pulmonary edema  # Acute respiratory distress syndrome, resolving  Presented to the ED on 6/18/2024 with acute on chronic hypoxic hypercapnic respiratory failure.  Transferred to MICU and intubated on overnight on 6/18. Workup significant for Stenotrophomonas, Enterococcus, and Candida pneumonia. Course was c/b progression to ARDS (6/21-22) with elevated plateau pressures. Pt remain intubated d/t ongoing pulmonary edema iso ESRD on iHD. CXR (6/26) with similar pulmonary opacities compared to 6/24, left > right.    - Antibiotics, as below  - Volume management with CRRT, as below  - Mechanical ventilation, wean as able, meets extubation readiness  - Daily SBT (AM and PM)  - Did not tolerate today due to increased RR and low tidal volumes  - If unable to wean in next 48-72 hours, consider discussion with family regarding tracheostomy and goals of care per transplant pulm team  - Pulmonary toilet  - Mucomyst BID  - Hypertonic saline BID, alternate with mucomyst  - Albuterol neb QID  - Did not tolerate volera    Vent Mode: CMV/AC  (Continuous Mandatory Ventilation/ Assist Control)  FiO2 (%): 45 %  Resp Rate (Set): 18 breaths/min  Tidal Volume (Set, mL): 320 mL  PEEP (cm H2O): 7 cmH2O  Pressure Support (cm H2O): 12 cmH2O  Resp: 30    # Recurrent right middle bronchus stenosis s/p dilation and stent (6/20/2024)  Has history (bronchoscopy 2/15/24) demonstrating narrowing of right mainstem transplant anastomosis and bronchus intermedius. CT chest (6/18/2024) and bronchoscopy (6/19/2024) demonstrated occlusion of right middle bronchus. With concern for post-obstructive pneumonia, interventional pulmonology was consulted, and completed bronchoscopy (6/20/2024) with tissue debulking, right middle bronchus balloon dilation to 11 mm, stent placement in right main bronchus, and bifurcating cast placement in right upper bronchus. Plan for saline nebs BID and outpatient bronchoscopy in 6 weeks.   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  - Transplant pulm consult, appreciate recs  - Tacrolimus level (6/27) was 4.5 (low)  - PTA nebs  - Fluticasone-viilanterol (breo ellipta) every day - HOLD with respiratory infection  - Montelukast every day   - Immunosuppressants per Tx Pulm:   - PTA Tacrolimus (dosing per tx pulm)  - PTA Prednisone 5 mg daily - HOLD with stress dose steroids  - PTA azathioprine - HOLD per Transplant pulmonology with repeat infections    Cardiovascular:  # Septic shock, persistent   On 6/19/2024, developed sepsis (hypotension 80s/50s, tachypnea 24) in the setting of stenotrophomonas pneumonia/enterococcus faecium VRE UTI. Etiology of shock likely distributive iso sepsis; less likely hypovolemic (not pulling volumes with CRRT), medication-associated (weaned off of propofol gtt) or obstructive (low suspicion for PE, echo 6/19 without evidence of cardiac dysfunction). Has been on and off pressors during course of ICU stay.    - IV pressors, wean as able   - Phenylephrine gtt in setting of blood pressure support from midodrine  - Discontinue vasopressin gtt  - MAP goal >65  - Stress dose steroids  - Hydrocortisone 50mg q6H (6/21-6/26); 50 mg BID (6/26-6/28); 25 mg BID (6/28-x)  - Fludrocortisone 0.1 mg qday (6/21-6/28); 0.05 mg qday (6/28-30)  - If hemodynamically stable, consider transitioning to PTA prednisone at increased 40 mg daily (then titrate to 5 mg)  - Continue midodrine 15mg TID for pressor weaning     # Demand Ischemia, resolved    Presented with elevated troponin (peak 499). Not associated with chest pain or hypoxia. EKG without ST elevations/depressions or T wave inversions. Suspect demand ischemia in the setting of sepsis. Will continue to monitor symptoms and continuous telemetry.      # Paroxysmal A-Fib, improved  # Pulmonary Hypertension   History of pulmonary hypertension (45 mmHg, echo 10/2023) in the setting of ILD and paroxysmal afib on PTA metoprolol tartrate BID.  Not on anticoagulation for afib.   - PTA metoprolol tartrate 25mg BID     GI/Nutrition:  # Nutrition  NJT placed (6/19/2024).   - Nutrition consult  - Tube feeds 30ml/hr; at goal    # Diarrhea, resolved  On 6/21, had 8 bowel movements. Occurred in the setting of scheduled bowel regimen and starting new antibiotics (meropenem, levofloxacin). C diff negative.   - Bowel regimen  - Miralax daily PRN   - Senna BID    # C/f cholecystitis vs choledocholithiasis/distal biliary obstruction  # Cholelithiasis with gallbladder wall thickening  # Elevated bili  # Elevated alk phos, resolved  Admission with elevated alkaline phosphatase (237) with elevated GGT consistent with hepatic etiology, resolved. On 6/27, pt endorsed RUQ pain with palpation and had elevated body temps (99-100F) on CRRT. LFTs and bili (6/27) was also noted have been trending upwards over the past few days. Labs on 6/28 improved, but RUQ U/S (6/28) showed gallbladder wall thickening measuring up to 6 mm with shadowing gallstones present and pericholecystic fluid; common bile duct dilated to 11 mm. Findings c/w cholelithiasis with c/f cholecystitis vs choledocholithiasis/distal biliary obstruction. Lipase negative   - T.Bili 0.8- low concern for obstruction  - Consult GI, appreciate recs  - MRCP completed showing borderline dilated CBD up to 1.1 cm, no significant intrahepatic biliary dilation, no evidence of choledocholithiasis, no evidence of acute cholecystitis.    Renal/Fluids/Electrolytes:  # ESRD on iHD (M,W,F)  History of ESRD 2/2 Tacrolimus toxicity on HD (MWF). With soft blood pressures, placed RIJ (6/20/2024) and started CRRT (6/20/2024).  - Nephrology consult  - CRRT: aim for net negative 500ml  - Discussed with nephrology about transitioning to goal of I=O on Sat (6/29)  - Renally-dosed medications  - RN electrolyte replacement    Endocrine:  # Risk for hyperglycemia with stress-dose steroids  Given high BG in high 200s on stress dose steroids and  fludrocortisone, started on insulin gtt on 6/25. Overnight on 6/26, BG dropped to 77, pt given IV bolus of D50W and sugars corrected. Hypoglycemic episode likely iso titrating down the stress dose steroids. S/p insulin gtt (6/25-27).   - HDSSI  - NPH 8U BID     ID:  # HAP, Stenotrophomonas maltophilia, Enterococcus faecalis, Aspergillus, and Candida guilliermondi complex  Presented to the ED on 6/18/2024 with SOB and hypercapnic respiratory failure. Found on bronchoscopy (6/19) to have RML obstruction by narrowing bronchus intermedius as well as copious mucopurulent secretions/mucus plugging. Previously treated for Stenotrophonomas/aspergillus pneumonia in 11/2023 with subsequent sputum culture (2/2024) negative for both Stenotrophonomas/Aspergillus. Etiology likely repeat Stenotrophomonas infection vs opportunistic infection from colonized microbe.   - Workup  - 6/18 sputum cx: Stenotrophomonas maltophilia (ceftazidime and levofloxacin R)  - 6/19 BAL cx: Stenotrophomonas maltophilia, Enterococcus faecalis (gentamicin R), Aspergillus (speciation in process)  - 6/21 BAL cx: Stenotrophomonas maltophilia and Enterococcus faecalis VRE  - 6/24 sputum cx: Stenotrophomonas maltophilia, Enterococcus faecalis VRE, and Candida guilliermondi complex  - Transplant pulmonology consult, appreciate recs  - Transplant ID consult, appreciate recs  - Present antibiotics  - IV minocycline (6/20-x)  - PO linezolid (6/25-x)  - IV micafungin (6/25-x) - until Aspergillus speciation results, still pending  - Past antibiotics  - S/p ceftazidime (6/25-6/28)    - S/p vancomycin (6/18-6/20)   - S/p zosyn (6/18-21)  - S/p Daptomycin (6/21-6/23)  - S/p Meropenem (6/21-6/24)  - S/p Levofloxacin (6/21-6/23)    # Pyuria, Enterococcus faecium VRE and  ESBL E. Coli   Asymptomatic. With progressive IV pressor needs, obtained broad infectious workup which demonstrated UCx (6/19/2024) with Enterococcus faecium VR and ESBL E. Coli. S/p Daptomycin  (6/21-6/23) and Meropenem (6/21-6/24).    # Hx Aspergillus PNA (11/2023)  # Hx PJP PNA (1/2021)  # Hx CMV viremia, recurrent  # Hx EBV viremia  - Transplant ID consult, appreciate recs  PTA posaconazole (Treatment for hx of aspergillus PNA)  PTA azithromycin 250mg qd (CLAD ppx)  PTA bactrim (PJP ppx)     Hematology:    # Acute on Chronic Normocytic Anemia, stable  # Thrombocytopenia, chronic  History of chronic anemia in the setting of kidney disease (baseline Hgb 10-11). Upon admission, Hgb 9. May be bone marrow suppression in the setting of chronic illness; potential contribution by blood loss with CRRT filter changes (~150-200c). Peripheral smear (6/18/2024) without evidence of schistocytes.  - Hgb 8.9 today  - Continue to monitor CBC    Musculoskeletal:  - PT / OT consult     Skin:  - No acute concerns.     General Cares/Prophylaxis:    DVT Prophylaxis: Heparin SQ  GI Prophylaxis: PPI  Restraints: None  Family Communication: , will update over phone or in person  Code Status: Full Code     Lines/tubes/drains:  - PIV x2, midline  - RIJ  - A line  - NGT  - ETT    Disposition:  - Medical ICU     Patient seen and findings/plan discussed with medical ICU staff, Dr. Jean.    Edin Davis MD  Internal Medicine Resident, PGY-1      Clinically Significant Risk Factors              # Hypoalbuminemia: Lowest albumin = 2.2 g/dL at 6/21/2024  4:26 PM, will monitor as appropriate   # Thrombocytopenia: Lowest platelets = 111 in last 2 days, will monitor for bleeding                 #Precipitous drop in Hgb/Hct: Lowest Hgb this hospitalization: 6.9 g/dL. Will continue to monitor and treat/transfuse as appropriate.      # Moderate Malnutrition: based on nutrition assessment    # Financial/Environmental Concerns: none                  ====================================  INTERVAL HISTORY:   Patient able to sleep 3-4 hours overnight. Reports pain throughout abdomen. PST failed this morning. No other concerns.   at bedside and updated.       OBJECTIVE:   1. VITAL SIGNS:   Temp:  [97.4  F (36.3  C)-99.3  F (37.4  C)] 97.8  F (36.6  C)  Pulse:  [] 85  Resp:  [18-31] 30  BP: (114)/(50) 114/50  MAP:  [54 mmHg-99 mmHg] 65 mmHg  Arterial Line BP: ()/(26-64) 110/47  FiO2 (%):  [45 %] 45 %  SpO2:  [91 %-100 %] 98 %  Vent Mode: CMV/AC  (Continuous Mandatory Ventilation/ Assist Control)  FiO2 (%): 45 %  Resp Rate (Set): 18 breaths/min  Tidal Volume (Set, mL): 320 mL  PEEP (cm H2O): 7 cmH2O  Pressure Support (cm H2O): 12 cmH2O  Resp: 30    2. INTAKE/ OUTPUT:   I/O last 3 completed shifts:  In: 2095.64 [I.V.:938.64; NG/GT:775]  Out: 2719 [Other:2094; Stool:625]    3. PHYSICAL EXAMINATION:  General: Laying in bed, NAD   HEENT: Atraumatic, moist mucous membranes   Pulm/Resp: Ongoing coarse breath sounds. Mild expiratory wheezing. Good air movement throughout.   CV: RRR, no m/r/g   Abdomen: Soft, non-distended, tender to palpation all quadrants.   Ext: Trace bilateral LE edema, pulses 2+ radial, pedal  Incisions/Skin: No rashes or lesions  Neuro: Awake, follows 1-step and 2-step commands, moving all extremities    4. LABS:   Arterial Blood Gases   Recent Labs   Lab 06/26/24  0310 06/25/24  1209 06/24/24  2126 06/24/24  1653   PH 7.35 7.29* 7.33* 7.30*   PCO2 44 51* 45 50*   PO2 137* 60* 133* 80   HCO3 24 24 24 25     Complete Blood Count   Recent Labs   Lab 06/30/24  0332 06/29/24  0359 06/28/24  0427 06/27/24  0348   WBC 6.3 4.8 4.4 5.2   HGB 8.9* 7.8* 8.0* 8.4*   * 111* 98* 112*     Basic Metabolic Panel  Recent Labs   Lab 06/30/24  1209 06/30/24  0816 06/30/24  0338 06/30/24  0332 06/29/24 2013 06/29/24  1604 06/29/24  0405 06/29/24  0359 06/28/24 1955 06/28/24  1646   NA  --   --   --  137  --  139  --  138  --  138   POTASSIUM  --   --   --  3.9  --  3.7  --  3.5  --  3.8   CHLORIDE  --   --   --  107  --  108*  --  107  --  106   CO2  --   --   --  21*  --  23  --  22  --  21*   BUN  --   --   --  29.0*   --  30.5*  --  28.2*  --  30.2*   CR  --   --   --  0.96*  --  1.06*  --  0.88  --  0.89   * 134* 184* 192*   < > 154*   < > 199*   < > 206*    < > = values in this interval not displayed.     Liver Function Tests  Recent Labs   Lab 06/30/24  0332 06/29/24  1604 06/29/24  0359 06/28/24  1646 06/28/24  0427 06/27/24  1636 06/27/24  0348   AST 26  --  22  --  16  --  20   ALT 38  --  36  --  32  --  38   ALKPHOS 178*  --  146  --  122  --  135   BILITOTAL 0.8  --  0.8  --  0.7  --  1.1   ALBUMIN 2.6* 2.5* 2.5* 2.7* 2.3*   < > 2.4*    < > = values in this interval not displayed.     Coagulation Profile  No lab results found in last 7 days.      5. RADIOLOGY:   No results found for this or any previous visit (from the past 24 hour(s)).

## 2024-06-30 NOTE — PLAN OF CARE
Time: 1900 - 0730    Major Shift Events: PERRL, GALLOWAY, tracks, follows commands. Denied pain overnight. Bilateral mitts in place, pt frequently reaching for feeding tube and ETT despite reorientation. RASS 0 to -1, dex and fent gtt's titrated accordingly. Pt appeared to sleep maybe 3-4 hours between cares. Tmax 99.0. Tele NSR/A-fib 60s-90s, frequent PAC's when in sinus. Phenyl gtt titrated between 0 and 2 to maintain MAP >65. Scant secretions via ETT, CMV settings overnight, no acute changes. TF advanced to goal, denies nausea. Loose stool via rectal tube. Anuric. CRRT fluid removal goal 0-100/hr, tolerating with low-dose phenyl. Lytes WNL, no replacements needed.     Plan: Continue CRRT, delirium treatment.Wean sedation and attempt another PST today after  arrives. Follow plan of care, update MD with changes.     For vital signs and complete assessments, please see documentation flowsheets.       Goal Outcome Evaluation:      Plan of Care Reviewed With: patient    Overall Patient Progress: no change

## 2024-06-30 NOTE — PROGRESS NOTES
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 Nephrology consulted for management of HD while admitted.      Interval History :   -Mrs Blue continues on CRRT, slightly net negative yesterday  -Wt today at 53.7kg. I agree that it is reasonable to continue attempts to pull fluid today to get her closer to dry weight as tolerated   -Na and K acceptable  -Calcium corrects up to normal for albumin  -Phos 4.8    Assessment & Recommendations:   ESRD-ESKD: pt dialyzes MWF at [facility] (ph [phone], f [phone]) with Dr. Glasgow. Run time: 3.5 hrs. EDW 52.5 kg. Access: L AVF. +heparin 1,500u bolus.                  -Appreciate team placing tri-alysis line to run norepi and will plan for CRRT, 4k baths, planning 0-100cc/h net negative to get her closer to EDW today, 500u heparin/h through circuit.                -No need for new dialysis consent, continuing long term HD for ESRD.                -L AVF with remote hx of needing intervention, no issue recently.      Outpt Rx:       Volume- Continue with I's = O's today and monitor weight trends/pressor needs.      Pulm-Admitted with hypercapnic resp failure, has post obstructive PNA. ID involved, getting bronch.  Currently on Zosyn, bactrim, Posaconazole, Azithromycin and Vanco x1 dose so far.       Electrolytes-Stable on CRRT, 4k baths, checking labs BID.      BMD-Stable on CRRT, has replacements ordered with CRRT order set.      Anemia-Hgb ~8 and drifting, last PRBC's 6/26 on venofer 50mg weekly but will hold while treating for infection. On Mircera 60mcg [id number], will cover with short term  "Epo while admitted when stable enough for HD.       Nutrition-[facility] renal started      Time spent: 35 minutes on this date of encounter for chart review, physical exam, medical decision making and co-ordination of care.       Physical Exam:   I/O last 3 completed shifts:  In: 2095.64 [I.V.:938.64; NG/GT:775]  Out: 2719 [Other:2094; Stool:625]   /50   Pulse 79   Temp 99.3  F (37.4  C) (Axillary)   Resp 20   Ht 1.6 m (5' 3\")   Wt 53.7 kg (118 lb 6.2 oz)   LMP 06/07/2014 (Exact Date)   SpO2 94%   BMI 20.97 kg/m       GENERAL APPEARANCE: Intubated but alert.    EYES: No scleral icterus  Pulmonary: lungs Rhonchi to auscultation with equal breath sounds bilaterally  CV: Regular rhythm, normal rate   - Edema none  GI: soft, nontender, normal bowel sounds  MS: no evidence of inflammation in joints, no muscle tenderness  : No Basilio  SKIN: no rash, warm, dry  NEURO: No focal deficits    Labs:   All labs reviewed by me  Electrolytes/Renal -   Recent Labs   Lab Test 06/30/24  0816 06/30/24  0338 06/30/24  0332 06/29/24 2013 06/29/24  1604 06/29/24  0405 06/29/24  0359   NA  --   --  137  --  139  --  138   POTASSIUM  --   --  3.9  --  3.7  --  3.5   CHLORIDE  --   --  107  --  108*  --  107   CO2  --   --  21*  --  23  --  22   BUN  --   --  29.0*  --  30.5*  --  28.2*   CR  --   --  0.96*  --  1.06*  --  0.88   * 184* 192*   < > 154*   < > 199*   CHELSEY  --   --  7.4*  --  7.2*  --  7.5*   MAG  --   --  2.4*  --  2.4*  --  2.4*   PHOS  --   --  4.8*  --  4.4  --  3.9    < > = values in this interval not displayed.       CBC -   Recent Labs   Lab Test 06/30/24 0332 06/29/24  0359 06/28/24  0427   WBC 6.3 4.8 4.4   HGB 8.9* 7.8* 8.0*   * 111* 98*       LFTs -   Recent Labs   Lab Test 06/30/24 0332 06/29/24  1604 06/29/24 0359 06/28/24  1646 06/28/24 0427   ALKPHOS 178*  --  146  --  122   BILITOTAL 0.8  --  0.8  --  0.7   ALT 38  --  36  --  32   AST 26  --  22  --  16   PROTTOTAL 4.9*  --  " 4.7*  --  4.3*   ALBUMIN 2.6* 2.5* 2.5*   < > 2.3*    < > = values in this interval not displayed.       Iron Panel -   Recent Labs   Lab Test 02/03/21  0415 12/13/18  1033 08/01/18  0921   IRON 51 16* 93   IRONSAT 36 7* 37   YOLA  --  302* 571*           Current Medications:  Current Facility-Administered Medications   Medication Dose Route Frequency Provider Last Rate Last Admin    acetylcysteine (MUCOMYST) 10 % nebulizer solution 4 mL  4 mL Inhalation BID Velez Reyes, German, MD   4 mL at 06/30/24 0857    azithromycin (ZITHROMAX) tablet 250 mg  250 mg Oral or Feeding Tube Daily Velez Reyes, German, MD   250 mg at 06/30/24 0810    B and C vitamin Complex with folic acid (NEPHRONEX) liquid 5 mL  5 mL Per Feeding Tube Daily Awa Watt MD   5 mL at 06/30/24 0812    calcium carbonate (TUMS) chewable tablet 500 mg  500 mg Oral Once per day on Sunday Tuesday Thursday Saturday Josesito Pratt MD   500 mg at 06/30/24 0818    chlorhexidine (PERIDEX) 0.12 % solution 15 mL  15 mL Mouth/Throat Q12H Chio Cortez MD   15 mL at 06/30/24 0811    epoetin alisha-epbx (RETACRIT) injection 4,000 Units  4,000 Units Intravenous Once per day on Monday Wednesday Friday Velez Reyes, German, MD   4,000 Units at 06/28/24 0812    hydrocortisone sodium succinate PF (solu-CORTEF) injection 25 mg  25 mg Intravenous BID Velez Reyes, German, MD   25 mg at 06/30/24 0810    insulin aspart (NovoLOG) injection (RAPID ACTING)  1-12 Units Subcutaneous Q4H Edin Davis MD   2 Units at 06/30/24 0418    insulin NPH injection 8 Units  8 Units Subcutaneous BID Velez Reyes, German, MD   8 Units at 06/30/24 0816    levalbuterol (XOPENEX) neb solution 0.63 mg  0.63 mg Nebulization 4x Daily Gareth Mosquera MD   0.63 mg at 06/30/24 0857    linezolid (ZYVOX) tablet 600 mg  600 mg Oral or Feeding Tube Q12H Velez Reyes, German, MD   600 mg at 06/29/24 1031    [Held by provider] magnesium chloride CR tablet 1,070 mg  1,070 mg Oral Once per day  on Sunday Tuesday Thursday Saturday Josesito Pratt MD        melatonin tablet 3 mg  3 mg Oral or Feeding Tube At Bedtime Velez Reyes, German, MD   3 mg at 06/29/24 2005    metoprolol tartrate (LOPRESSOR) tablet 25 mg  25 mg Oral BID Velez Reyes, German, MD   25 mg at 06/30/24 0810    micafungin (MYCAMINE) 150 mg in sodium chloride 0.9 % 100 mL intermittent infusion  150 mg Intravenous Q24H Chio Cortez  mL/hr at 06/30/24 0103 150 mg at 06/30/24 0103    midodrine (PROAMATINE) tablet 15 mg  15 mg Oral TID w/meals Kajal Chong APRN CNP   15 mg at 06/30/24 0811    minocycline (MINOCIN) 100 mg in sodium chloride 0.9 % 100 mL intermittent infusion  100 mg Intravenous Q12H Kajal Chong APRN  mL/hr at 06/29/24 2349 100 mg at 06/29/24 2349    montelukast (SINGULAIR) tablet 10 mg  10 mg Oral At Bedtime Velez Reyes, German, MD   10 mg at 06/29/24 2109    pantoprazole (PROTONIX) 2 mg/mL suspension 40 mg  40 mg Per Feeding Tube Daily Josesito Pratt MD   40 mg at 06/29/24 1405    posaconazole (NOXAFIL) DR tablet TBEC 300 mg  300 mg Per Feeding Tube Daily Josesito Pratt MD   300 mg at 06/29/24 1405    [Held by provider] predniSONE (DELTASONE) tablet 5 mg  5 mg Oral Daily Velez Reyes, German, MD   5 mg at 06/21/24 0822    protein modular (PROSOURCE TF20) packet 1 packet  1 packet Per Feeding Tube BID Awa Watt MD   1 packet at 06/30/24 0814    QUEtiapine (SEROquel) tablet 50 mg  50 mg Oral or Feeding Tube At Bedtime Kajal Chong APRN CNP   50 mg at 06/29/24 2109    ramelteon (ROZEREM) tablet 8 mg  8 mg Oral At Bedtime Velez Reyes, German, MD   8 mg at 06/29/24 2109    sodium chloride (NEBUSAL) 3 % neb solution 3 mL  3 mL Nebulization BID Velez Reyes, German, MD   3 mL at 06/29/24 2104    sodium chloride (PF) 0.9% PF flush 10 mL  10 mL Intracatheter Q8H Kajal Chong APRN CNP   10 mL at 06/29/24 1606    sodium chloride (PF) 0.9% PF flush 10 mL  10 mL Intracatheter Q8H Kajal Chong,  APRN CNP   10 mL at 06/29/24 1606    sulfamethoxazole-trimethoprim (BACTRIM) 400-80 MG per tablet 1 tablet  1 tablet Oral or Feeding Tube Daily Josesito Pratt MD   1 tablet at 06/29/24 2005    tacrolimus (GENERIC) suspension 0.8 mg  0.8 mg Oral or Feeding Tube BID IS Yonny Orona MD   0.8 mg at 06/30/24 0812    valGANciclovir (VALCYTE) solution 450 mg  450 mg Per Feeding Tube Daily Rufina Huff CNP   450 mg at 06/30/24 0811    Vitamin D3 (CHOLECALCIFEROL) tablet 50 mcg  50 mcg Oral Once per day on Monday Wednesday Friday Velez Reyes, German, MD   50 mcg at 06/28/24 0851     Current Facility-Administered Medications   Medication Dose Route Frequency Provider Last Rate Last Admin    dexmedeTOMIDine (PRECEDEX) 4 mcg/mL in sodium chloride 0.9 % 100 mL infusion  0.1-1.2 mcg/kg/hr (Dosing Weight) Intravenous Continuous Chio Cortez MD 6.4 mL/hr at 06/30/24 0900 0.5 mcg/kg/hr at 06/30/24 0900    dextrose 10% infusion   Intravenous Continuous PRN Velez Reyes, German, MD        dextrose 10% infusion   Intravenous Continuous PRN Awa Watt MD        dialysate for CVVHD & CVVHDF (Phoxillum BK4/2.5)  12.5 mL/kg/hr CRRT Continuous Jonna Rodas  mL/hr at 06/30/24 0522 12.5 mL/kg/hr at 06/30/24 0522    fentaNYL (SUBLIMAZE) infusion   mcg/hr Intravenous Continuous Velez Reyes, German, MD 0.5 mL/hr at 06/30/24 0900 25 mcg/hr at 06/30/24 0900    heparin (porcine) 20,000 units in 20 mL ANTICOAGULANT infusion (syringe from pharmacy)  500 Units/hr CRRT Continuous Hardy Tran APRN CNS 0.5 mL/hr at 06/30/24 0900 500 Units/hr at 06/30/24 0900    phenylephrine (DANDY-SYNEPHRINE) 50 mg in NaCl 0.9 % 250 mL infusion  0.1-6 mcg/kg/min (Dosing Weight) Intravenous Continuous Chio Cortez MD   Stopped at 06/30/24 0815    POST-filter replacement solution for CVVHD & CVVHDF (Phoxillum BK4/2.5)   CRRT Continuous Jonna Rodas  mL/hr at 06/29/24 2246 New Bag at 06/29/24 224     PRE-filter replacement solution for CVVHD & CVVHDF (Phoxillum BK4/2.5)  12.5 mL/kg/hr CRRT Continuous Jonna Rodas  mL/hr at 06/30/24 0519 12.5 mL/kg/hr at 06/30/24 0519    vasopressin 1 unit/mL MAX Conc (PITRESSIN) infusion  1.8 Units/hr Intravenous Continuous Velez Reyes, German, MD   Stopped at 06/29/24 8206

## 2024-06-30 NOTE — PROGRESS NOTES
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  The patient was admitted on 24 for progressive hypoxia with dyspnea and worsening hypercapnia in ED requiring intubation likely d/t post-obstructive PNA 2/2 right bronchus stenosis.  Bronch confirming severe stenosis of right anastomosis with extensive RLL plugging.  IP bronch () with laser tissue debulking RMB stenosis, airway balloon dilation of RMB and BI, and placement of stent RMB to BI and bifurcating stent in RUL.  S/p volume resuscitation and transition to CRRT  for hypotension.  Increased agitation/delirium on  with increasing desaturation led to need for increased sedation.  Recurrence of paroxysmal afib with RVR first noted .  Remains intubated with pulmonary edema on imaging and septic shock requiring pressor support, poor tolerance of PST.        Today's recommendations:    Ventilator weaning per ICU-continues to fail PS trials-complicated by poor PS tolerance. If difficult to wean in the next 48-72 hours, will need to discuss with family re: tracheostomy and goals of care.  Ct Chest  reviewed-differentials include pulm edema, multifocal pneumonia. If no improvement with fluid removal, would consider high dose prednisone trial for acute  rejection rx.   Prednisone chronic dose on hold for stress-dose steroids per MICU, resume if hydrocortisone <20 mg daily  Continue Posaconazole (long term med), micafungin 150 mg daily per transplant ID  CMV weekly monitoring (qTuesday) and VGCV ppx given stress dose steroids with tentative plan for 3 weeks beyond completion of steroid course.  Volume removal with CRRT as able per nephrology-aim for negative balance daily as tolerated.    Yonny Orona MD [facility]  Associate Professor of Medicine  Division of Pulmonary, Allergy & Critical Care   [facility]  [facility]       Septic shock:  Acute on chronic hypoxic/hypercapneic respiratory failure:  Post-obstructive multi-lobar PNA:  Right bronchial stenosis with RML collapse: Admitted with 2 weeks of progressive hypoxia, dyspnea, congested cough, and fatigue.  Chronic hypoxia with 2L NC, increased from 3 to 4L over this time with acute decompensation on day of admission associated with hypercapnia.  VBG 7/17/85 in ED s/p intubation.  IP bronch 2/15 s/p tissue debulking without stent placement.  Negative ID workup: respiratory panel, COVID, MRSA nares, PJP, Legionella, Strep pneumoniae, cocci, Histo, CrAg, fungitell x2, and A. galactomannan (blood).  IgG WNL.  Procal mildly elevated at 0.24 initially (then elevated to 1.77 6/20), LA normal, but febrile to 100.7.  TTE on admission grossly normal.  CT with RUL/RLL consolidative opacities, RML collapse, and narrowing of BI and distal RLL bronchus.  Started on norepi 6/19.  Bronch per MICU (6/19) with severe stenosis starting at right anastomosis, inspection with smaller scope revealing for extensive RLL mucous plugging.  Bronch per IP (6/20) with laser tissue debulking RMB stenosis, airway balloon dilation of RMB and BI, and placement of stent RMB to BI and bifurcating stent in RUL.  Intermittent/low grade fevers persisting.  Remains on full vent support, very poor  tolerance of PST.    - Bronch culture (6/19) with Steno, VSE. faecalis, Candida guilliermondii complex and Aspergillus, susceptibilities requested by transplant ID  - BAL (6/21) with Steno, VRE, and E. faecalis  - Sputum culture (6/24) with Steno, VRE, and Candida guilliermondii complex  - Blood cultures (6/24) NGTD  - IgG 6/19/.  - ABX: ceftazidime (6/25), linezolid (6/25), and minocycline (6/20, Steno) per transplant ID; s/p meropenem (6/21-6/24), daptomycin (6/21-6/24), levofloxacin (6/21-6/23), Zosyn (6/18-6/21), azithromycin 500 mg daily (6/18-6/20), and vancomycin (6/18)   - Nebs: levalbuterol QID, Mucomyst 10% BID alternating with 3% HTS (6/22), will need to be on NS nebs BID upon discharge per IP  - Vent management per MICU  - Stress dose steroids per MICU (started 6/21, decreased 6/26)  - Bronchoscopy in coordination with IP.     S/p bilateral sequential lung transplant (BSLT) for ILD:   Progressive CLAD: Most recent OP visit in February.  DSA negative 6/19 (last positive noted 2018).  Repeat ImmuKnow (6/19) 87, very low but IST adjustment deferred per Dr. Graham given acuity and steroids (after ImmuKnow level).  Repeat Prospera remains high at 2.3 on 6/19, may be artificially elevated with current infection, but also notably high at 2.42 on 2/14.  - Delay Prospera and ImmuKnow till full recovery from pneumonia  - PTA Singulair, azithromycin, and Breo inhaler (resume once extubated)     Immunosuppression:  On 2-drug IST since November d/t leukopenia and recurrent infections    - Tacrolimus 0.8 mg BID (increased 6/27).  Dose increased 6/29 .Goal level 8-10.  Follow level 7/1.   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  - ABX and  infectious work up as above     Aspergillus ochraceus:  H/o Aspergillus empyema:  S/p empyema drainage and ampho B instillation.  Previously on voriconazole, transitioned to posaconazole and managed by transplant ID as OP with last visit 3/13.  Calcified fungal elements remain with additional recurrent effusion (likely associated with fluid disturbances r/t iHD), but surgical intervention previously deferred d/t risk.  Posaconazole level 2.5 on 6/19.  - Posaconazole 300 mg daily chronically, micafungin 150 mg daily (6/25) per transplant ID     H/o CMV viremia: Recurrent CMV viremia historically.  VGCV ppx most recently stopped 4/12.  Recent CMV low positive at 46 (6/12), <35 (6/18), and 39 (6/25).  - CMV weekly monitoring (qTuesday) with steroid increase  - VGCV ppx (renally dosed) given stress dose steroids (6/24) with tentative plan for 3 weeks beyond completion of stress dose steroids      EBV viremia: S/p rituximab 12/13/23 x1 dose.  Most recent EBV negative 6/18.    Septic shock: wean pressors per MICU. Continue Midodrine.     Dilated CBD,suspected acute cholecystitis- MRCP per GI-6/29-cholelithiasis without cholecystitis.     ESRD on iHD: Kidney evaluation started as OP.  On qMWF iHD schedule, no missed sessions.  Transitioned to CRRT on 6/20, management per renal and MICU with eventual goal for fluid removal as able.      VRE urine culture: Noted 6/19, >100k GNB and VRE faecium, ABX as above with management per MICU team and transplant ID.     We appreciate the excellent care provided by the MICU team.  Recommendations communicated via this note.  Will continue to follow along closely, please do not hesitate to call with any questions or concerns.          Subjective & Interval History:     Remains intubated on full vent support.  Poor tolerance of PST yesterday. Delirium +, not following commands consistently.    Review of Systems:     ROS as above, otherwise significantly limited due to intubation and  "sedation.    Physical Exam:     All notes, images, and labs from past 24 hours (at minimum) were reviewed.    Vital signs:  Temp: 99.3  F (37.4  C) Temp src: Axillary BP: 114/50 Pulse: 78   Resp: 28 SpO2: 99 % O2 Device: Mechanical Ventilator Oxygen Delivery: 6 LPM Height: 160 cm (5' 3\") Weight: 53.7 kg (118 lb 6.2 oz)  I/O:   Intake/Output Summary (Last 24 hours) at 6/28/2024 0756  Last data filed at 6/28/2024 0700  Gross per 24 hour   Intake 2528.73 ml   Output 3650.1 ml   Net -1121.37 ml     Constitutional: Lying supine in bed, spouse at bedside, in no apparent distress.   HEENT: Eyes with pink conjunctivae, anicteric.  Orally intubated.  Left nare feeding tube.  PULM: Mildly diminished air flow bilaterally.  Coarse crackles with expiratory wheezes t/o.    CV: Normal S1 and S2.  RRR.  No murmur, gallop, or rub.    ABD: NABS, soft, nontender, nondistended.    MSK: Moves all extremities.    NEURO: Lethargic but arousable, minimally conversant.   SKIN: Warm, dry, fragile.   PSYCH: Restless.     Data:     LABS    CMP:   Recent Labs   Lab 06/30/24  1209 06/30/24  0816 06/30/24  0338 06/30/24  0332 06/29/24 2013 06/29/24  1604 06/29/24  0405 06/29/24  0359 06/28/24  1955 06/28/24  1646 06/28/24  0432 06/28/24  0427 06/27/24  0355 06/27/24  0348   NA  --   --   --  137  --  139  --  138  --  138  --  139   < > 137   POTASSIUM  --   --   --  3.9  --  3.7  --  3.5  --  3.8  --  3.4   < > 3.6   CHLORIDE  --   --   --  107  --  108*  --  107  --  106  --  110*   < > 106   CO2  --   --   --  21*  --  23  --  22  --  21*  --  21*   < > 22   ANIONGAP  --   --   --  9  --  8  --  9  --  11  --  8   < > 9   * 134* 184* 192*   < > 154*   < > 199*   < > 206*   < > 229*   < > 255*  265*   BUN  --   --   --  29.0*  --  30.5*  --  28.2*  --  30.2*  --  29.3*   < > 34.4*   CR  --   --   --  0.96*  --  1.06*  --  0.88  --  0.89  --  0.82   < > 0.90   GFRESTIMATED  --   --   --  67  --  59*  --  74  --  73  --  81   < > 72 " "  CHELSEY  --   --   --  7.4*  --  7.2*  --  7.5*  --  7.6*  --  6.9*   < > 7.8*   MAG  --   --   --  2.4*  --  2.4*  --  2.4*  --  2.4*  --  2.2   < > 2.3   PHOS  --   --   --  4.8*  --  4.4  --  3.9  --  4.2  --  4.2   < > 4.3   PROTTOTAL  --   --   --  4.9*  --   --   --  4.7*  --   --   --  4.3*  --  4.6*   ALBUMIN  --   --   --  2.6*  --  2.5*  --  2.5*  --  2.7*  --  2.3*   < > 2.4*   BILITOTAL  --   --   --  0.8  --   --   --  0.8  --   --   --  0.7  --  1.1   ALKPHOS  --   --   --  178*  --   --   --  146  --   --   --  122  --  135   AST  --   --   --  26  --   --   --  22  --   --   --  16  --  20   ALT  --   --   --  38  --   --   --  36  --   --   --  32  --  38    < > = values in this interval not displayed.     CBC:   Recent Labs   Lab 06/30/24  0332 06/29/24  0359 06/28/24  0427 06/27/24  0348   WBC 6.3 4.8 4.4 5.2   RBC 2.95* 2.58* 2.64* 2.76*   HGB 8.9* 7.8* 8.0* 8.4*   HCT 29.1* 25.1* 25.6* 27.1*   MCV 99 97 97 98   MCH 30.2 30.2 30.3 30.4   MCHC 30.6* 31.1* 31.3* 31.0*   RDW 17.0* 16.8* 17.1* 17.3*   * 111* 98* 112*       INR: No lab results found in last 7 days.    Glucose:   Recent Labs   Lab 06/30/24  1209 06/30/24  0816 06/30/24  0338 06/30/24  0332 06/30/24  0012 06/29/24  2013   * 134* 184* 192* 132* 123*       Blood Gas:   Recent Labs   Lab 06/26/24  0310 06/25/24  1209 06/24/24  2126   O2PER 50 50 60       Culture Data No results for input(s): \"CULT\" in the last 168 hours.    Virology Data:   Lab Results   Component Value Date    FLUAH1 Not Detected 06/18/2024    FLUAH3 Not Detected 06/18/2024    SI7835 Not Detected 06/18/2024    IFLUB Not Detected 06/18/2024    RSVA Not Detected 06/18/2024    RSVB Not Detected 06/18/2024    PIV1 Not Detected 06/18/2024    PIV2 Not Detected 06/18/2024    PIV3 Not Detected 06/18/2024    HMPV Not Detected 06/18/2024    HRVS Negative 01/24/2021    ADVBE Negative 01/24/2021    ADVC Negative 01/24/2021    ADVC Negative 12/23/2020    ADVC Negative " 10/07/2019       Historical CMV results (last 3 of prior testing):  Lab Results   Component Value Date    CMVQNT <35 (A) 06/18/2024    CMVQNT Not Detected 03/05/2024    CMVQNT Not Detected 12/19/2023     Lab Results   Component Value Date    CMVLOG 1.6 06/25/2024    CMVLOG <1.5 06/18/2024    CMVLOG 2.5 02/14/2024       Urine Studies    Recent Labs   Lab Test 06/19/24  1214 01/24/21  1729   URINEPH 6.5 5.0   NITRITE Negative Negative   LEUKEST Large* Moderate*   WBCU >182* 34*           IMAGING    Recent Results (from the past 48 hour(s))   XR Chest Port 1 View    Narrative    Exam: XR CHEST PORT 1 VIEW, 6/26/2024 10:07 AM    Indication: hypoxia    Comparison: Chest x-ray 6/24/2024    Findings:     Portable frontal projection chest radiograph. Similar postsurgical  changes and support devices including sternotomy wires, right  bronchial stent, endotracheal tube, right IJ catheter, right midline  catheter and partially visualized enteric tube. Similar pulmonary  opacities, left more than right, predominantly mid and lower zone and  cardiac silhouette. No acute airspace opacity. No discernible  pneumothorax. Possible trace effusions. Low lung volumes      Impression    Impression: Stable support devices. Similar pulmonary opacities, left  more than right, compared to prior radiograph from 6/24/2024,  representing edema/infection with atelectasis.    EDIE VILLA MD         SYSTEM ID:  [id number]

## 2024-06-30 NOTE — PROVIDER NOTIFICATION
06/30/24 1625 06/30/24 1630 06/30/24 1645   Weaning Assessment   Weaning Assessment Complete Y  --   --    Safety Screen Weaning Assessment Weaning Assessment meets all criteria  --   --    RASS (Chavez Agitation-Sedation Scale) 1-->restless  --   --    Pulse  --  86 86   /49  --   --    Spontaneous Respiratory Rate 28 breaths/min  --   --    Spontaneous Tidal Volume (mL) 216 mL  --   --    Spontaneous Minute Ventilation 5.59 mL  --   --    RSBI 129.63  --   --    Weaning trial discontinued due to:  --   --   --    Wean Start Time  1624  --   --    Wean End Time   --   --   --    Wean Time Calculated (min)  --   --   --       06/30/24 1700 06/30/24 1713   Weaning Assessment   Weaning Assessment Complete  --   --    Safety Screen Weaning Assessment  --   --    RASS (Chavez Agitation-Sedation Scale)  --   --    Pulse 89  --    BP  --   --    Spontaneous Respiratory Rate  --   --    Spontaneous Tidal Volume (mL)  --   --    Spontaneous Minute Ventilation  --   --    RSBI  --   --    Weaning trial discontinued due to:  --  Agitation/unresolved anxiety   Wean Start Time   --  1624   Wean End Time   --  1713   Wean Time Calculated (min)  --  49          Pt Ps x2 today at 12/7. While most numbers remained stable. Pt was anxious from the start and would repeatedly ask to be placed on full support despite oxygen saturations, Vt and rate remaining stable.    Krystle Sheth RRT

## 2024-07-01 NOTE — PROGRESS NOTES
[facility] STATUS NOTE    DATA:  Time:  6:30 AM  Pressures WNL:  YES  Filter Status:  WDL    Problems Reported/Alarms Noted:  None.    Supplies Present:  YES    ASSESSMENT:  Patient Net Fluid Balance:  Net -296.16ml @ 0600.  Vitals: T 98.4, HR 72, RR 23, /53, MAP 72.  Labs: K 3.6, Mg 2.5,Phos 4.7, iCa 4.5 ,Hgb 9.0, Plt 131   Goals of Therapy: 0-100 mL/hr    INTERVENTIONS:   None.    PLAN:  Continue to monitor circuit daily and change set q72 hours or PRN for clotting/clogging. Please call CRRT RN with any quesitons/problems.

## 2024-07-01 NOTE — PROGRESS NOTES
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     Attestation:  Total duration of visit including chart review, reviewing labs and imaging, interviewing and examining the patient, documentation, and sending communication to the primary treating team, all at the same day of this encounter, is: >80 minutes.       Lissett Lewis MD  Transplant Infectious Diseases Attending  [phone]          Synopsis of Immune Status and Presentation:   Transplant Summary  Transplants:  3/1/2018 (Lung); POD  2314.  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 Found to hae mucous plugs and R-sided bronchial stenosis on BAL (6/19/24). Underwent R sided stent placement with purulent discharge noted. She developed ongoing septic shock and respiratory failure with mucous plugs on BAL.         Active Problems and Infectious Diseases Issues:   Acute hypercapnic and hypoxic respiratory failure  Shock, resolved  Bilateral multilobar pneumonia  R mainstem bronchial stenosis s/p dilation (2/15/24 and 6/20/24) with stent placement   (6/20/24)  BAL cultures growing VSE faecalis, VRE faecium, Stenotrophomonas maltophilia, and C guillermondii   BAL cultures with VSE faecalis, VRE faecium, Stenotrophomonas, and C guillermondi. Note that the Stenotrophomonas has been a long-term colonizer (dates back to 9/2019). The Enterococcus is a pretty uncommon cause of invasive pneumonia, as is the C. Guillermondii.  However, given her shock and hypoxic respiratory failure, we have opted to treat all of these pathogens. There is some thought that her dilation on 6/20/24 may have stirred up her pathogens. She is also being treated with steroids for ?ARDS and has volume overload, for which she is receiving CRRT. Currently close to her dry weight. She was started on steroid taper for possible ARDS on 6/21/24. Will plan to complete a 14 day course of minocycline for Stenotrophomonas (Dates: 6/20-7/5). Although it is much less likely to be a cause of invasive pneumonia, we will complete a 10-day course of linezolid for possible VRE pneumonia (Dates: 6/25-7/5).            Pyuria  Urine cultures with E coli and VRE faecium (6/19/24)  Completed 7 days antibiotics for E coli (6/21-6/28). VRE faecium started receiving adequate treatment on 6/21/24 with Linezolid. No need for additional antibiotics for bacteruria once pneumonia has been treated (see above).            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 Has been on treatment with voriconazole ([id number])-->posaconazole 2020-current. 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 This is an uncommon cause of invasive disease, but have opted to treat given tenuous status and positive BAL galactomannan. Interestingly, repeat BAL on 6/21 with negative galactomannan and no growth. Blood galactomannan (6/24) negative. At this point, has remained on posaconazole (trough 2.5 on 6/19). Was started on micafungin on 6/25/24. Since Aspergillus ocharaceus can have more variable susceptibilities to advanced aozles, will await susceptibility testing results to determine final treatment plan.     C guilliermondii complex from respiratory cultures (6/19 and 6/24)   Grew on BAL cultures from 6/19 and ET tube cultures from 6/24. Unlikely to be a true pathogen. Initially grew from BAL on 6/19 and again on ET tube cultures from 6/24. At this point, she is on micafungin for coverage since 6/25 for Aspergillus, which will cover.     CMV Viremia  Low-grade viremia ranging from just detected to 42 international units starting on 5/1/2024. Remains on prophylactic valvanciclovir since 6/24/24, given steroid use. Doing weekly CMV VL.     Transplant Checklist  - QTc interval: 438 (6/18/24)  - Pneumocystis prophylaxis: Bactrim Single Strength daily for life (h/o PJP)   - Bacterial prophylaxis:  Azithromycin  - Fungal prophylaxis: Posaconazole long-term, given Aspergillus fumigatus empyema.   - Serostatus & viral prophylaxis: CMV D+/R+, EBV D+/R+   - Immunization status:  Needs Spring COVID-19 booster, PCV-20  - Gamma globulin status:   Recent Labs   Lab Test 06/19/24  0839      - Antibiotic allergies: Vancomycin: erythroderma with itching. Will need premedication with Benadryl prior to  giving.         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 Decreased to 135k 2/2022. Neg 6/27/22. Increased to 1 million in 11/2023 s/p 1 dose of rituximab and decreasing/undetected in 05/2024. Negative EBV 6/19/24.  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 BAL showed septate hyphae. 7/2020 had hypodense mass in left lung with biopsy showing fungal hyphae, cx aspergillus. Started on voriconazole in 2019, changed to posaconazole in 2020, she remains on 300 mg daily.  1/2021 BAL cx with steno: tx ceftazidime for 2 weeks.  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 Recovery from this required tracheostomy.  2019 - She had also grown Actinomyces from multiple BAL         Interval Events    Remains on low-dose phenylephrine over the weekend. Remains stable on 45% FiO2 and PEEP 7. Have been able to remove volume on CRRT (net -700 yesterday). Remains on stress-dose steroids.   CT chest (6/29) with multifocal panlobar opacities.   Planning for trach placement over coming days with ENT         Immunizations:     Immunization History   Administered Date(s) Administered    COVID-19 Bivalent 12+ (Pfizer) 11/18/2022    COVID-19 MONOVALENT 12+ (Pfizer) 03/10/2021, 03/30/2021, 10/12/2021    Hep B, Adult (Heplisav- B) 06/04/2020, 07/04/2020, 08/05/2020, 12/07/2020, 06/15/2022, 07/20/2022, 08/17/2022, 01/18/2023, 07/19/2023, 08/16/2023    HepB, Unspecified 06/04/2020, 07/04/2020, 08/05/2020, 12/07/2020    Hepatitis B, Adult 06/04/2020, 07/04/2020, 08/05/2020, 12/07/2020    Influenza Vaccine 18-64 (Flublok) 10/09/2019    Influenza Vaccine 65+ (Fluzone HD) 02/24/2016    Influenza Vaccine >6 months,quad, PF 10/22/2014, 10/26/2015, 02/24/2016, 11/29/2016, 12/01/2017, 10/29/2018, 10/12/2020, 10/03/2022    Influenza Vaccine  IM Ages 6-35 Months 4 Valent (PF) 02/24/2016    Influenza, seasonal, injectable, PF 02/24/2016    Mantoux Tuberculin Skin Test 10/30/2019, 11/07/2019    Pneumo Conj 13-V (2010&after) 02/24/2016    Pneumococcal 23 valent 11/20/2014, 12/18/2019    Pneumococcal, Unspecified 02/24/2016    TD,PF 7+ (Tenivac) 03/11/1996    TDAP (Adacel,Boostrix) 02/04/2008    TDAP Vaccine (Boostrix) 12/13/2018    Td (Adult), Adsorbed 03/11/1996    Tdap (Adult) Unspecified Formulation 02/04/2008    Zoster recombinant adjuvanted (SHINGRIX) 03/06/2019, 06/05/2019            Allergies:     Allergies   Allergen Reactions    Isopropyl Alcohol Other (See Comments)     The alcohol burns the open areas on her skin when used as a skin prep for procedures    Vancomycin Other (See Comments)     Diffuse erythroderma with itching (improved with Benadryl) after receiving vancomycin over 1 hour. 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 Can use vancomycin in the future, but please give over slower infusion time (at least 2 hours) and premedicate with Benadryl.             Medications:   Medications that Require Transfusion:   Current Facility-Administered Medications   Medication Dose Route Frequency Provider Last Rate Last Admin    dexmedeTOMIDine (PRECEDEX) 4 mcg/mL in sodium chloride 0.9 % 100 mL infusion  0.1-1.2 mcg/kg/hr (Dosing Weight) Intravenous Continuous Chio Cortez MD 3.8 mL/hr at 07/01/24 1040 0.3 mcg/kg/hr at 07/01/24 1040    dextrose 10% infusion   Intravenous Continuous PRN Velez Reyes, German, MD        dextrose 10% infusion   Intravenous Continuous PRN Awa Watt MD        dialysate for CVVHD & CVVHDF (Phoxillum BK4/2.5)  12.5 mL/kg/hr CRRT Continuous Jonna Rodas  mL/hr at 07/01/24 1026 12.5 mL/kg/hr at 07/01/24 1026    fentaNYL (SUBLIMAZE) infusion   mcg/hr Intravenous Continuous Velez Reyes, German, MD 0.5 mL/hr at 07/01/24 1100 25  mcg/hr at 07/01/24 1100    heparin (porcine) 20,000 units in 20 mL ANTICOAGULANT infusion (syringe from pharmacy)  500 Units/hr CRRT Continuous Hardy Tran APRN CNS 0.5 mL/hr at 07/01/24 1000 500 Units/hr at 07/01/24 1000    phenylephrine (DANDY-SYNEPHRINE) 50 mg in NaCl 0.9 % 250 mL infusion  0.1-6 mcg/kg/min (Dosing Weight) Intravenous Continuous Chio Cortez MD 7.6 mL/hr at 07/01/24 1005 0.5 mcg/kg/min at 07/01/24 1005    POST-filter replacement solution for CVVHD & CVVHDF (Phoxillum BK4/2.5)   CRRT Continuous Jonna oRdas  mL/hr at 06/30/24 2321 New Bag at 06/30/24 2321    PRE-filter replacement solution for CVVHD & CVVHDF (Phoxillum BK4/2.5)  12.5 mL/kg/hr CRRT Continuous Jonna Rodas  mL/hr at 07/01/24 1025 12.5 mL/kg/hr at 07/01/24 1025     Scheduled Medications:   Current Facility-Administered Medications   Medication Dose Route Frequency Provider Last Rate Last Admin    acetylcysteine (MUCOMYST) 10 % nebulizer solution 4 mL  4 mL Inhalation BID Velez Reyes, German, MD   4 mL at 07/01/24 0833    azithromycin (ZITHROMAX) tablet 250 mg  250 mg Oral or Feeding Tube Daily Velez Reyes, German, MD   250 mg at 07/01/24 0846    B and C vitamin Complex with folic acid (NEPHRONEX) liquid 5 mL  5 mL Per Feeding Tube Daily Awa Watt MD   5 mL at 07/01/24 0844    calcium carbonate (TUMS) chewable tablet 500 mg  500 mg Oral Once per day on Sunday Tuesday Thursday Saturday Josesito Pratt MD   500 mg at 06/30/24 0818    chlorhexidine (PERIDEX) 0.12 % solution 15 mL  15 mL Mouth/Throat Q12H Chio Cortez MD   15 mL at 07/01/24 0846    epoetin alisha-epbx (RETACRIT) injection 4,000 Units  4,000 Units Intravenous Once per day on Monday Wednesday Friday Velez Reyes, German, MD   4,000 Units at 07/01/24 0845    hydrocortisone sodium succinate PF (solu-CORTEF) injection 25 mg  25 mg Intravenous BID Velez Reyes, German, MD   25 mg at 07/01/24 0845    insulin aspart (NovoLOG)  injection (RAPID ACTING)  1-12 Units Subcutaneous Q4H Edin Davis MD   1 Units at 07/01/24 0411    insulin NPH injection 8 Units  8 Units Subcutaneous BID Velez Reyes, German, MD   8 Units at 07/01/24 0847    levalbuterol (XOPENEX) neb solution 0.63 mg  0.63 mg Nebulization 4x Daily Gareth Mosquera MD   0.63 mg at 07/01/24 0833    linezolid (ZYVOX) tablet 600 mg  600 mg Oral or Feeding Tube Q12H Velez Reyes, German, MD   600 mg at 06/30/24 2357    [Held by provider] magnesium chloride CR tablet 1,070 mg  1,070 mg Oral Once per day on Sunday Tuesday Thursday Saturday Josesito Pratt MD        melatonin tablet 3 mg  3 mg Oral or Feeding Tube At Bedtime Velez Reyes, German, MD   3 mg at 06/30/24 2003    metoprolol tartrate (LOPRESSOR) tablet 25 mg  25 mg Oral BID Velez Reyes, German, MD   25 mg at 07/01/24 0847    micafungin (MYCAMINE) 150 mg in sodium chloride 0.9 % 100 mL intermittent infusion  150 mg Intravenous Q24H Chio Cortez  mL/hr at 07/01/24 0201 150 mg at 07/01/24 0201    midodrine (PROAMATINE) tablet 15 mg  15 mg Oral TID w/meals Kajal Chong APRN CNP   15 mg at 07/01/24 0845    minocycline (MINOCIN) 100 mg in sodium chloride 0.9 % 100 mL intermittent infusion  100 mg Intravenous Q12H Kajal Chong APRN  mL/hr at 07/01/24 0100 100 mg at 07/01/24 0100    montelukast (SINGULAIR) tablet 10 mg  10 mg Oral At Bedtime Velez Reyes, German, MD   10 mg at 06/30/24 2125    pantoprazole (PROTONIX) 2 mg/mL suspension 40 mg  40 mg Per Feeding Tube Daily Josesito Pratt MD   40 mg at 06/30/24 1211    posaconazole (NOXAFIL) DR tablet TBEC 300 mg  300 mg Per Feeding Tube Daily Josesito Pratt MD   300 mg at 06/30/24 1210    [Held by provider] predniSONE (DELTASONE) tablet 5 mg  5 mg Oral Daily Velez Reyes, German, MD   5 mg at 06/21/24 0822    protein modular (PROSOURCE TF20) packet 1 packet  1 packet Per Feeding Tube BID Awa Watt MD   1 packet at 07/01/24 0847     QUEtiapine (SEROquel) tablet 50 mg  50 mg Oral or Feeding Tube At Bedtime Kajal Chong APRN CNP   50 mg at 06/30/24 2125    ramelteon (ROZEREM) tablet 8 mg  8 mg Oral At Bedtime Velez Reyes, German, MD   8 mg at 06/30/24 2125    sodium chloride (NEBUSAL) 3 % neb solution 3 mL  3 mL Nebulization BID Velez Reyes, German, MD   3 mL at 06/30/24 2100    sodium chloride (PF) 0.9% PF flush 10 mL  10 mL Intracatheter Q8H Kajal Chong APRN CNP   10 mL at 07/01/24 0956    sodium chloride (PF) 0.9% PF flush 10 mL  10 mL Intracatheter Q8H Kajal Chong APRN CNP   10 mL at 06/30/24 1614    sulfamethoxazole-trimethoprim (BACTRIM) 400-80 MG per tablet 1 tablet  1 tablet Oral or Feeding Tube Daily Josesito Pratt MD   1 tablet at 06/30/24 2003    tacrolimus (GENERIC) suspension 0.8 mg  0.8 mg Oral or Feeding Tube BID IS Yonny Orona MD   0.8 mg at 07/01/24 0848    valGANciclovir (VALCYTE) solution 450 mg  450 mg Per Feeding Tube Daily Rufina Huff CNP   450 mg at 07/01/24 0843    Vitamin D3 (CHOLECALCIFEROL) tablet 50 mcg  50 mcg Oral Once per day on Monday Wednesday Friday Velez Reyes, German, MD   50 mcg at 06/28/24 0851               Physical Exam:   Temp: 97.1  F (36.2  C) Temp src: Axillary BP: (!) 140/56 Pulse: 73   Resp: (!) 36 SpO2: 97 % O2 Device: Mechanical Ventilator      Wt Readings from Last 4 Encounters:   07/01/24 51.6 kg (113 lb 12.1 oz)   05/16/24 54.4 kg (120 lb)   03/13/24 55.3 kg (122 lb)   03/05/24 57.7 kg (127 lb 4.8 oz)     Constitutional: awake, alert, intubated, but responsive.   Head, ENT, Eyes, and Neck: Normocephalic,ET tube in place. No scleral icterus.   PERRL, EOMI, pink conjunctivae, non-icteric sclera.   Neck supple without rigidity.   Neurologic: Patient is moving all extremities without focal deficit, no focal sensory loss.   Lungs: Coarse bilaterally, no accessory muscle use, No wheezes.   CVS: RRR, normal S1/S2, no murmur, PMI was not displaced.   Abdomen: non-tender,  "non-distended, no masses, no bruit, no shifting dullness, normal BS.   Musculoskeletal: +Trace edema bilaterally. no ulcers, normal ROM of all joints, no swelling or erythema of any of joints and no tenderness to palpation.   Skin: no induration,no rashes noted.            Data:   No results found for: \"ACD4\"    Inflammatory Markers    Recent Labs   Lab Test 06/18/24  1418 10/29/23  0642 10/28/23  0725 10/07/19  1221 10/06/19  1444 09/13/19  0934 09/11/19  2325 08/22/19  0820   SED 10 66* 58* 93*  --  111* 102*  --    CRP  --   --   --   --  25.0* 58.0* 66.0* 47.0*       Immune Globulin Studies     Recent Labs   Lab Test 06/19/24  0839 10/27/23  0701 10/24/23  1033 09/15/21  0915 01/31/21  0441 01/25/21  0406 10/27/19  0621 03/01/18  0356 02/19/18  0759    979  --  1,198 763  --  936 698 1,790*   IGM  --   --   --   --   --   --   --   --  502*   IGE  --   --  <2  --   --  <2  --   --  <2   IGA  --   --   --   --   --   --   --   --  425*   IGG1  --   --   --   --   --   --   --   --  1,300*   IGG2  --   --   --   --   --   --   --   --  131*   IGG3  --   --   --   --   --   --   --   --  101   IGG4  --   --   --   --   --   --   --   --  1*       Metabolic Studies       Recent Labs   Lab Test 07/01/24  0955 07/01/24  0353 07/01/24  0346 06/30/24  2354 06/30/24 2011 06/30/24  1537 06/30/24  1527 06/30/24  0338 06/30/24  0332 06/29/24 2013 06/29/24  1604 06/29/24  0405 06/29/24  0359 06/28/24 1955 06/28/24  1646 06/27/24  0355 06/27/24 0348 06/25/24  0350 06/25/24 0348 06/24/24  1942 06/24/24  1816 06/24/24  0013 06/23/24 2006   NA  --   --  138  --   --   --  137  --  137  --  139  --  138  --  138   < > 137   < > 136  --   --    < >  --    POTASSIUM  --   --  3.6  --   --   --  3.8  --  3.9  --  3.7  --  3.5  --  3.8   < > 3.6   < > 4.1  --   --    < >  --    CHLORIDE  --   --  107  --   --   --  106  --  107  --  108*  --  107  --  106   < > 106   < > 105  --   --    < >  --    CO2  --   --  22  --   " --   --  21*  --  21*  --  23  --  22  --  21*   < > 22   < > 22  --   --    < >  --    ANIONGAP  --   --  9  --   --   --  10  --  9  --  8  --  9  --  11   < > 9   < > 9  --   --    < >  --    BUN  --   --  28.1*  --   --   --  28.1*  --  29.0*  --  30.5*  --  28.2*  --  30.2*   < > 34.4*   < > 40.7*  --   --    < >  --    CR  --   --  0.89  --   --   --  0.88  --  0.96*  --  1.06*  --  0.88  --  0.89   < > 0.90   < > 1.02*  --   --    < >  --    GFRESTIMATED  --   --  73  --   --   --  74  --  67  --  59*  --  74  --  73   < > 72   < > 62  --   --    < >  --    * 155* 170* 125* 154* 162* 173*   < > 192*   < > 154*   < > 199*   < > 206*   < > 255*  265*   < > 279*   < >  --    < >  --    A1C  --   --   --   --   --   --   --   --   --   --   --   --   --   --   --   --   --   --  5.1  --   --   --   --    CHELSEY  --   --  7.8*  --   --   --  7.9*  --  7.4*  --  7.2*  --  7.5*  --  7.6*   < > 7.8*   < > 8.0*  --   --    < >  --    PHOS  --   --  4.7*  --   --   --  4.6*  --  4.8*  --  4.4  --  3.9  --  4.2   < > 4.3   < > 3.9  --   --    < >  --    MAG  --   --  2.5*  --   --   --  2.5*  --  2.4*  --  2.4*  --  2.4*  --  2.4*   < > 2.3   < > 2.4*  --   --    < >  --    LACT  --   --   --   --   --   --   --   --   --   --   --   --   --   --   --   --   --   --   --   --  0.9  --  0.9   CKT  --   --  26  --   --   --   --   --   --   --   --   --   --   --   --   --  18*  --   --   --   --    < >  --     < > = values in this interval not displayed.       Hepatic Studies    Recent Labs   Lab Test 07/01/24  0346 06/30/24  1527 06/30/24  0332 06/29/24  1604 06/29/24  0359 06/28/24  1646 06/28/24  0427 06/27/24  1636 06/27/24  0348 06/26/24  1557 06/26/24  0310 06/19/24  0613 06/18/24  1803 09/15/21  0915 05/01/21  0654 01/27/21  0414 01/26/21  0425   BILITOTAL 0.6  --  0.8  --  0.8  --  0.7  --  1.1  --  0.8   < > 0.6   < >  --    < > 0.8   ALKPHOS 173*  --  178*  --  146  --  122  --  135  --  134   < > 235*   <  >  --    < > 184*   ALBUMIN 2.6* 2.7* 2.6* 2.5* 2.5* 2.7* 2.3*   < > 2.4*   < > 2.6*   < > 3.6   < >  --    < > 2.0*   AST 25  --  26  --  22  --  16  --  20  --  22   < > 39   < >  --    < > 11   ALT 36  --  38  --  36  --  32  --  38  --  38   < > 39   < >  --    < > 30   LDH  --   --   --   --   --   --   --   --   --   --   --   --   --   --  164  --  187   GGT  --   --   --   --   --   --   --   --   --   --   --   --  147*  --   --   --   --     < > = values in this interval not displayed.       Pancreatitis testing    Recent Labs   Lab Test 06/27/24  1636 06/18/24  1803 10/25/23  1356 05/26/22  0750 09/15/21  0915 01/24/21  0000 02/19/20  0713 12/31/19  1033 10/24/19  2032 10/21/19  1451 10/06/19  1444 09/11/19  2325 03/04/18  0353 02/19/18  0759   AMYLASE  --   --   --   --   --   --   --   --   --   --   --   --   --  58   LIPASE 39 40 24  --   --   --   --   --  212 119 172 127  --   --    TRIG  --   --   --  155* 68 242* 77 98  --   --   --   --  191* 115       Hematology Studies      Recent Labs   Lab Test 07/01/24  0346 06/30/24  0332 06/29/24  0359 06/28/24  0427 06/27/24  0348 06/26/24  1200 06/26/24  0310 06/25/24  0348 06/24/24  0335   WBC 7.8 6.3 4.8 4.4 5.2  --  3.0* 3.0* 3.6*   ANEU  --   --  4.2 3.3 3.2  --  1.7 1.4* 2.8   ALYM  --   --  0.3* 0.8 1.6  --  1.1 1.1 0.3*   SHERRI  --   --  0.2 0.2 0.4  --  0.2 0.3 0.4   AEOS  --   --  0.0 0.0 0.0  --  0.0 0.0 0.0   HGB 9.0* 8.9* 7.8* 8.0* 8.4* 8.8* 6.9* 7.0* 7.5*   HCT 29.4* 29.1* 25.1* 25.6* 27.1*  --  23.1* 23.0* 23.7*   * 126* 111* 98* 112*  --  86* 70* 69*       Clotting Studies    Recent Labs   Lab Test 06/18/24  1418 03/05/24  0925 02/14/24  1050 10/24/23  1033 02/19/21  0458 02/12/21  0315 08/04/18  0709 08/03/18  0624 06/19/18  0000 06/11/18  0925 03/12/18  1123 03/05/18  1648   INR 0.98 0.98 0.96 0.99   < >  --    < > 1.09   < > 0.92   < > 1.11   PTT  --   --   --   --   --  28  --  26  --  26  --  24    < > = values in this interval  "not displayed.       Arterial Blood Gas Testing    Recent Labs   Lab Test 06/30/24  1537 06/26/24  0310 06/25/24  1209 06/24/24  2126 06/24/24  1653   PH 7.38 7.35 7.29* 7.33* 7.30*   PCO2 41 44 51* 45 50*   PO2 154* 137* 60* 133* 80   HCO3 24 24 24 24 25   O2PER 45 50 50 60 60        Urine Studies     Recent Labs   Lab Test 06/19/24  1214 01/24/21  1729 10/21/19  2240 09/12/19  0125 08/07/19  1512   URINEPH 6.5 5.0 5.0 7.5* 7.0   NITRITE Negative Negative Negative Negative Negative   LEUKEST Large* Moderate* Large* Negative Trace*   WBCU >182* 34* 115* 3 19*       Vancomycin Levels     Recent Labs   Lab Test 06/19/24  0613 10/26/23  0606 09/21/21  1911 01/28/21  0412 01/26/21  0425 01/25/21  1259   VANCOMYCIN 15.6 18.6 18.8 21.6 31.4* 15.1       Tobramycin levels     No lab results found.    Gentamicin levels    No lab results found.    Tacrolimus levels    Invalid input(s): \"TACROLIMUS\", \"TAC\", \"TACR\"      Latest Ref Rng & Units 7/1/2024     3:46 AM 6/30/2024     3:27 PM 6/30/2024     3:32 AM 6/29/2024     4:04 PM 6/29/2024     3:59 AM   Transplant Immunosuppression Labs   Creat 0.51 - 0.95 mg/dL 0.89  0.88  0.96  1.06  0.88    Urea Nitrogen 8.0 - 23.0 mg/dL 28.1  28.1  29.0  30.5  28.2    WBC 4.0 - 11.0 10e3/uL 7.8   6.3   4.8    Neutrophil % 76   77   88    ANEU 1.6 - 8.3 10e3/uL     4.2        Cyclosporine levels    Invalid input(s): \"CYCLOSPORINE\", \"CYC\"    Mycophenolate levels    Invalid input(s): \"MYPA\", \"MYP\"    Sirolimus levels    Invalid input(s): \"SIROLIMUS\", \"SIR\", \"RAPA\"    CSF testing   No lab results found.      Last check of C difficile  C Diff Toxin B PCR   Date Value Ref Range Status   02/13/2021 Negative NEG^Negative Final     Comment:     Negative: C. difficile target DNA sequences NOT detected, presumed negative   for C.difficile toxin B or the number of bacteria present may be below the   limit of detection for the test.  FDA approved assay performed using [employer] GeneXpert real-time PCR.  A " "negative result does not exclude actual disease due to C. difficile and may   be due to improper collection, handling and storage of the specimen or the   number of organisms in the specimen is below the detection limit of the assay.       C Difficile Toxin B by PCR   Date Value Ref Range Status   06/22/2024 Negative Negative Final     Comment:     A negative result does not exclude actual disease due to C. difficile and may be due to improper collection, handling and storage of the specimen or the number of organisms in the specimen is below the detection limit of the assay.       Virology:    No results found for: \"H6RES\"    EBV DNA Copies/mL   Date Value Ref Range Status   05/01/[phone] (A) EBVNEG^EBV DNA Not Detected [Copies]/mL Final   02/22/2021 75,[phone] (A) EBVNEG^EBV DNA Not Detected [Copies]/mL Final   01/25/2021 5,619 (A) EBVNEG^EBV DNA Not Detected [Copies]/mL Final   12/09/2020 10,686 (A) EBVNEG^EBV DNA Not Detected [Copies]/mL Final   09/09/2020 2,935 (A) EBVNEG^EBV DNA Not Detected [Copies]/mL Final   03/09/2020 8,918 (A) EBVNEG^EBV DNA Not Detected [Copies]/mL Final   02/19/2020 38,419 (A) EBVNEG^EBV DNA Not Detected [Copies]/mL Final   12/18/2019 11,933 (A) EBVNEG^EBV DNA Not Detected [Copies]/mL Final   11/11/2019 9,669 (A) EBVNEG^EBV DNA Not Detected [Copies]/mL Final   10/24/2019 13,391 (A) EBVNEG^EBV DNA Not Detected [Copies]/mL Final   08/01/2018 EBV DNA Not Detected EBVNEG^EBV DNA Not Detected [Copies]/mL Final       CMV Antibody IgG   Date Value Ref Range Status   03/01/2018 Canceled, Test credited 0.0 - 0.8 AI Final     Comment:     Test reordered as correct code  SEE CMV QUANTITATIVE PCR     03/01/2018 >8.0 (H) 0.0 - 0.8 AI Final     Comment:     Positive  Antibody index (AI) values reflect qualitative changes in antibody   concentration that cannot be directly associated with clinical condition or   disease state.     02/19/2018 >8.0 (H) 0.0 - 0.8 AI Final     Comment:     " Positive  Antibody index (AI) values reflect qualitative changes in antibody   concentration that cannot be directly associated with clinical condition or   disease state.         Toxoplasma Antibody IgG   Date Value Ref Range Status   02/19/2018 <3.0 0.0 - 7.1 IU/mL Final     Comment:     Negative- Absence of detectable Toxoplasma gondii IgG antibodies. A negative   result does not rule out acute infection.  The magnitude of the measured result is not indicative of the amount of   antibody present. The concentrations of anti-Toxoplasma gondii IgG in a given   specimen determined with assays from different manufacturers can vary due to   differences in assay methods and reagent specificity.         Lissett Lewis MD  [facility]  Contact information available via [facility] Paging/Directory     07/01/2024

## 2024-07-01 NOTE — CONSULTS
Otolaryngology-Head and Neck Surgery Consultation    We were asked to evaluate this patient by Randi James MD for evaluation of placement of tracheostomy.     she was intubated 6/18/2024. ENT was consulted for tracheostomy placement given prolonged ventilator dependence and anticipated slow wean from the vent. Patient previously had a trach placed in February 2021 during an admission for acute respiratory failure/hypoximea in the setting of PJP pneumonia and prolonged ventilator dependence that was subsequently decannulated 3/4/2024.      Discussed recommendation for tracheostomy with the patient and  at bedside. They had discussed with MICU prior to our visit and understood the procedure, did not have any questions and wish to proceed. Patient is planned for bronchoscopy with interventional pulmonology tomorrow to evaluate bronchial stent and primary team goal is for tracheostomy in the near future after that procedure.    HPI:  63 year old female with PMH ILD s/p b/l lung transplant (2018) complicated by R bronchial stenosis & pneumonia, EBV & CMV viremia, and ESRD on HD who presented 6/18/24 for acute hypercapnic respiratory failure.     PMHx:  Past Medical History:   Diagnosis Date    Acute on chronic respiratory failure with hypoxia (H) 02/21/2018    Anisocoria     Antisynthetase syndrome (H24) 2014    Anxiety     Aspergillus (H)     Aspergillus pneumonia (H) 11/20/2020    Benign essential hypertension     C. difficile colitis     Cytomegalovirus (CMV) viremia (H)     Dermatomyositis (H)     Dysplasia of cervix, low grade (ESTRADA 1)     EBV (Franklin-Barr virus) viremia     ESRD (end stage renal disease) on dialysis (H)     ILD (interstitial lung disease) (H)     Lung replaced by transplant (H)     Osteopenia     Paroxysmal atrial fibrillation (H)     Pneumocystis jiroveci pneumonia (H)     PONV (postoperative nausea and vomiting)     Post-menopause     Pulmonary hypertension (H)     Raynaud's  disease     Seronegative rheumatoid arthritis (H)        PSHx:  Past Surgical History:   Procedure Laterality Date    BRONCHOSCOPY (RIGID OR FLEXIBLE), DIAGNOSTIC N/A 04/10/2018    Procedure: COMBINED BRONCHOSCOPY (RIGID OR FLEXIBLE), LAVAGE;;  Surgeon: Mariposa Donohue MD;  [facility]    BRONCHOSCOPY (RIGID OR FLEXIBLE), DIAGNOSTIC N/A 12/23/2020    Procedure: BRONCHOSCOPY, WITH BRONCHOALVEOLAR LAVAGE;  Surgeon: Uri Bass MD;  [facility]    BRONCHOSCOPY (RIGID OR FLEXIBLE), DIAGNOSTIC N/A 05/26/2022    Procedure: BRONCHOSCOPY, WITH BRONCHOALVEOLAR LAVAGE;  Surgeon: Uri Bass MD;  [facility]    BRONCHOSCOPY (RIGID OR FLEXIBLE), DIAGNOSTIC N/A 08/17/2023    Procedure: BRONCHOSCOPY, WITH BRONCHOALVEOLAR LAVAGE;  Surgeon: Esau Bruno MD;  [facility]    BRONCHOSCOPY (RIGID OR FLEXIBLE), DIAGNOSTIC N/A 11/01/2023    Procedure: BRONCHOSCOPY, WITH BRONCHOALVEOLAR LAVAGE;  Surgeon: Beto Magaña DO;  [facility]    BRONCHOSCOPY (RIGID OR FLEXIBLE), DILATE BRONCHUS / TRACHEA N/A 10/11/2018    Procedure: BRONCHOSCOPY (RIGID OR FLEXIBLE), DILATE BRONCHUS / TRACHEA;  Flexible And Rigid Bronchoscopy and Dilation;  Surgeon: Wilber Lin MD;  [facility]    BRONCHOSCOPY (RIGID OR FLEXIBLE), DILATE BRONCHUS / TRACHEA N/A 11/01/2023    Procedure: Bronchoscopy (Rigid Or Flexible), Dilate Bronchus / Trachea;  Surgeon: Beto Magaña DO;  [facility]    BRONCHOSCOPY FLEXIBLE N/A 03/13/2018    Procedure: BRONCHOSCOPY FLEXIBLE;  Flexible Bronchoscopy ;  Surgeon: Gissell Sanchez MD;  [facility]    BRONCHOSCOPY FLEXIBLE N/A 05/09/2018    Procedure: BRONCHOSCOPY FLEXIBLE;;  Surgeon: Wilber Lin MD;  [facility]    BRONCHOSCOPY FLEXIBLE AND RIGID N/A 09/10/2018    Procedure: BRONCHOSCOPY FLEXIBLE AND RIGID;  Flexible and Rigid Bronchoscopy with Balloon Dilation, tissue debulking with cryo, and Right mainstem bronchus stent placement;   Surgeon: Wilber Lin MD;  [facility]    BRONCHOSCOPY RIGID N/A 06/06/2018    Procedure: BRONCHOSCOPY RIGID;;  Surgeon: Lopez Macias MD;  [facility]    BRONCHOSCOPY, DILATE BRONCHUS, STENT BRONCHUS, COMBINED N/A 06/11/2018    Procedure: COMBINED BRONCHOSCOPY, DILATE BRONCHUS, STENT BRONCHUS;  Flexible Bronchoscopy, Balloon Dilation, Bronchial Washings;  Surgeon: Wilber Lin MD;  [facility]    BRONCHOSCOPY, DILATE BRONCHUS, STENT BRONCHUS, COMBINED Right 07/10/2018    Procedure: COMBINED BRONCHOSCOPY, DILATE BRONCHUS, STENT BRONCHUS;  Flexible Bronchoscopy, Balloon Dilation, Bronchial Washings  ;  Surgeon: Wilber Lin MD;  [facility]    BRONCHOSCOPY, DILATE BRONCHUS, STENT BRONCHUS, COMBINED N/A 08/02/2018    Procedure: COMBINED BRONCHOSCOPY, DILATE BRONCHUS, STENT BRONCHUS;  Flexible Bronchoscopy, Bronchial Washings, Balloon Dilation;  Surgeon: Wilber Lin MD;  [facility]    BRONCHOSCOPY, DILATE BRONCHUS, STENT BRONCHUS, COMBINED N/A 08/20/2018    Procedure: COMBINED BRONCHOSCOPY, DILATE BRONCHUS, STENT BRONCHUS;  Flexible Bronchoscopy, Balloon Dilation;  Surgeon: Wilber Lin MD;  [facility]    BRONCHOSCOPY, DILATE BRONCHUS, STENT BRONCHUS, COMBINED N/A 10/29/2018    Procedure: Flexible Bronchoscopy, Balloon Dilation, Stent Revision, Airway Examination And Therapeutic Suctioning, Cyro Tumor Debulking;  Surgeon: Wilber Lin MD;  [facility]    BRONCHOSCOPY, DILATE BRONCHUS, STENT BRONCHUS, COMBINED N/A 11/20/2018    Procedure: Rigid Bronchoscopy, Stent Removal and dilitation;  Surgeon: Wilber Lin MD;  [facility]    BRONCHOSCOPY, DILATE BRONCHUS, STENT BRONCHUS, COMBINED N/A 12/14/2018    Procedure: Flexible And Rigid Bronchoscopy, Balloon Dilation, Bronchial Washing;  Surgeon: Wilber Lin MD;  [facility]    BRONCHOSCOPY, DILATE BRONCHUS, STENT BRONCHUS, COMBINED N/A 01/17/2019     Procedure: Flexible And Rigid Bronchoscopy, Balloon Dilation.;  Surgeon: Wilber Lin MD;  [facility]    BRONCHOSCOPY, DILATE BRONCHUS, STENT BRONCHUS, COMBINED N/A 03/07/2019    Procedure: Flexible and Rigid Bronchoscopy, Bronchial Washing, Balloon Dilation;  Surgeon: Wilber Lin MD;  [facility]    BRONCHOSCOPY, DILATE BRONCHUS, STENT BRONCHUS, COMBINED N/A 06/06/2019    Procedure: Rigid and Flexible Bronchoscopy, Balloon Dilation;  Surgeon: Wilber Lin MD;  [facility]    BRONCHOSCOPY, DILATE BRONCHUS, STENT BRONCHUS, COMBINED N/A 10/11/2019    Procedure: Flexible and Rigid Bronchoscopy, Balloon Dilation, Bronchoalveolar Lagave;  Surgeon: Wilber Lin MD;  [facility]    BRONCHOSCOPY, DILATE BRONCHUS, STENT BRONCHUS, COMBINED N/A 02/19/2021    Procedure: BRONCHOSCOPY, flexible, airway dilation, and bronchial wash;  Surgeon: Wilber Lin MD;  [facility]    BRONCHOSCOPY, DILATE BRONCHUS, STENT BRONCHUS, COMBINED N/A 04/09/2021    Procedure: BRONCHOSCOPY, flexible and rigid, Airway suctioning;  Surgeon: Mati Norris MD;  [facility]    BRONCHOSCOPY, DILATE BRONCHUS, STENT BRONCHUS, COMBINED N/A 10/17/2023    Procedure: RIGID, flexible bronchoscopy with airway dilation;  Surgeon: Preet Patel MD;  [facility]    BRONCHOSCOPY, DILATE BRONCHUS, STENT BRONCHUS, COMBINED N/A 6/20/2024    Procedure: RIGID AND FLEXIBLE BRONCHOSCOPY, BALLOON DILATION, TISSUE DEBULKING WITH C02 LASER, STENT PLACEMENT;  Surgeon: Juana Baugh MD;  [facility]    CV RIGHT HEART CATH MEASUREMENTS RECORDED N/A 03/10/2020    Procedure: CV RIGHT HEART CATH;  Surgeon: Wai Garcia MD;  Location:  [facility]    ENT SURGERY      tonsillectomy as a child    ESOPHAGOSCOPY, GASTROSCOPY, DUODENOSCOPY (EGD), COMBINED N/A 10/29/2018    Procedure: COMBINED ESOPHAGOSCOPY, GASTROSCOPY, DUODENOSCOPY (EGD) with biopsies and polypectomy;   Surgeon: Chente Bloom MD;  [facility]    INSERT EXTRACORPORAL MEMBRANE OXYGENATOR ADULT (OUTSIDE OR) N/A 02/27/2018    Procedure: INSERT EXTRACORPORAL MEMBRANE OXYGENATOR ADULT (OUTSIDE OR);  INSERT EXTRACORPORAL MEMBRANE OXYGENATOR ADULT (OUTSIDE OR) ;  Surgeon: Toby Hernandez MD;  [facility]    IR CVC TUNNEL PLACEMENT > 5 YRS OF AGE  10/25/2019    IR DIALYSIS FISTULOGRAM LEFT  03/02/2021    IR DIALYSIS FISTULOGRAM LEFT  11/02/2023    IR DIALYSIS FISTULOGRAM LEFT  03/05/2024    IR DIALYSIS MECH THROMB, PTA  03/02/2021    IR DIALYSIS PTA  11/02/2023    IR FLUORO 0-1 HOUR  05/07/2021    IR GASTRO JEJUNOSTOMY TUBE PLACEMENT  02/16/2021    IR PARACENTESIS  01/08/2020    IR THORACENTESIS  09/13/2019    IR TRANSCATHETER BIOPSY  01/08/2020    LASER CO2 BRONCHOSCOPY N/A 04/30/2021    Procedure: Flexible and Rigid Bronchoscopy and Tissue Debulking with CO2 Laser Assistance;  Surgeon: Mati Norris MD;  [facility]    LASER CO2 BRONCHOSCOPY N/A 06/11/2021    Procedure: BRONCHOSCOPY, Flexible and Rigid Bronchoscopy, Tissue Debulking with cryo Assistance, airway dilation,;  Surgeon: Mati Norris MD;  [facility]    LASER CO2 BRONCHOSCOPY N/A 09/16/2021    Procedure: BRONCHOSCOPY, flexible and rigid, CO2 Laser Debulking;  Surgeon: Mati Norris MD;  [facility]    LASER CO2 BRONCHOSCOPY N/A 02/11/2022    Procedure: flexible, rigid bronchoscopy, tissue debulking, airway dilation, co2 laser, bronchoalveolar lavage;  Surgeon: Juana Baugh MD;  [facility]    LASER CO2 BRONCHOSCOPY Right 11/17/2023    Procedure: flexible bronchosocopy, tissue/tumor debulking, using CO2 laser, mitomycin application and argon plasma coagulation;  Surgeon: Mati Norris MD;  [facility]    LASER CO2 BRONCHOSCOPY N/A 02/15/2024    Procedure: Flexible, Rigid Bronchoscopy,Tumor/Tissue debulking using Carbon Dioxide (CO2) laser; balloon dilation;  Surgeon: Wilber Lin MD;   [facility]    MIDLINE SINGLE LUMEN PLACEMENT Right 06/19/2024    3FR SL midline.    no prior surgery      REMOVE EXTRACORPORAL MEMBRANE OXYGENATOR ADULT N/A 03/03/2018    Procedure: REMOVE EXTRACORPORAL MEMBRANE OXYGENATOR ADULT;  Removal of Right Femoral Venous and Right Axillary Arterial Extracorporeal Membrane Oxygenator;  Surgeon: Toby Hernandez MD;  [facility]    TRANSPLANT LUNG RECIPIENT SINGLE X2 Bilateral 03/01/2018    Procedure: TRANSPLANT LUNG RECIPIENT SINGLE X2;  Median Sternotomy, Extracorporeal Membrane Oxygenator, bilateral sequential lung transplant;  Surgeon: Toby Hernandez MD;  [facility]       Current Medications:    Current Facility-Administered Medications:     acetaminophen (TYLENOL) tablet 325 mg, 325 mg, Oral or Feeding Tube, Q4H, Jailyn Lou MD, 325 mg at 07/01/24 1626    acetylcysteine (MUCOMYST) 10 % nebulizer solution 4 mL, 4 mL, Inhalation, BID, Velez Reyes, German, MD, 4 mL at 07/01/24 1702    azithromycin (ZITHROMAX) tablet 250 mg, 250 mg, Oral or Feeding Tube, Daily, Velez Reyes, German, MD, 250 mg at 07/01/24 0846    B and C vitamin Complex with folic acid (NEPHRONEX) liquid 5 mL, 5 mL, Per Feeding Tube, Daily, Awa Watt MD, 5 mL at 07/01/24 0844    [Held by provider] benzonatate (TESSALON) capsule 100 mg, 100 mg, Oral, Q4H PRN, Velez Reyes, German, MD    calcium carbonate (TUMS) chewable tablet 500 mg, 500 mg, Oral, Once per day on Sunday Tuesday Thursday Saturday, Josesito Pratt MD, 500 mg at 06/30/24 0818    calcium gluconate 2 g in  mL intermittent infusion, 2 g, Intravenous, Q8H PRN, Hardy Tran APRN CNS, 2 g at 07/01/24 1731    calcium gluconate 4 g in sodium chloride 0.9 % 100 mL intermittent infusion, 4 g, Intravenous, Q8H PRN, Hardy Tran APRN CNS    carboxymethylcellulose PF (REFRESH PLUS) 0.5 % ophthalmic solution 1 drop, 1 drop, Both Eyes, Q1H PRN, Ken Goncalves MD, 1 drop at  07/01/24 1737    chlorhexidine (PERIDEX) 0.12 % solution 15 mL, 15 mL, Mouth/Throat, Q12H, Chio Cortez MD, 15 mL at 07/01/24 0846    dexmedeTOMIDine (PRECEDEX) 4 mcg/mL in sodium chloride 0.9 % 100 mL infusion, 0.1-1.2 mcg/kg/hr (Dosing Weight), Intravenous, Continuous, Chio Cortez MD, Last Rate: 6.4 mL/hr at 07/01/24 1825, 0.5 mcg/kg/hr at 07/01/24 1825    dextrose 10% infusion, , Intravenous, Continuous PRN, Velez Reyes, German, MD    dextrose 10% infusion, , Intravenous, Continuous PRN, Awa Watt MD    glucose gel 15-30 g, 15-30 g, Oral, Q15 Min PRN **OR** dextrose 50 % injection 25-50 mL, 25-50 mL, Intravenous, Q15 Min PRN, 25 mL at 06/27/24 0139 **OR** glucagon injection 1 mg, 1 mg, Subcutaneous, Q15 Min PRN, Adilia Hunt MD    dialysate for CVVHD & CVVHDF (Phoxillum BK4/2.5), 12.5 mL/kg/hr, CRRT, Continuous, Jonna Rodas MD, Last Rate: 700 mL/hr at 07/01/24 1737, 12.5 mL/kg/hr at 07/01/24 1737    epoetin alisha-epbx (RETACRIT) injection 4,000 Units, 4,000 Units, Intravenous, Once per day on Monday Wednesday Friday, Velez Reyes, German, MD, 4,000 Units at 07/01/24 0845    fentaNYL (PF) (SUBLIMAZE) injection 50 mcg, 50 mcg, Intravenous, Q1H PRN, Gareth Mosquera MD, 50 mcg at 07/01/24 1740    heparin (porcine) 20,000 units in 20 mL ANTICOAGULANT infusion (syringe from pharmacy), 500 Units/hr, CRRT, Continuous, Hardy Tran APRN CNS, Last Rate: 0.5 mL/hr at 07/01/24 1800, 500 Units/hr at 07/01/24 1800    hydrocortisone sodium succinate PF (solu-CORTEF) injection 25 mg, 25 mg, Intravenous, BID, Velez Reyes, German, MD, 25 mg at 07/01/24 0845    insulin aspart (NovoLOG) injection (RAPID ACTING), 1-12 Units, Subcutaneous, Q4H, Edin Davis MD, 1 Units at 07/01/24 0411    insulin NPH injection 8 Units, 8 Units, Subcutaneous, BID, Velez Reyes, German, MD, 8 Units at 07/01/24 0847    levalbuterol (XOPENEX) neb solution 0.63 mg, 0.63 mg, Nebulization, 4x Daily, Demetri,  Gareth Orona MD, 0.63 mg at 07/01/24 1702    lidocaine (LMX4) cream, , Topical, Q1H PRN, Kajal Chong APRN CNP    lidocaine 1 % 0.1-1 mL, 0.1-1 mL, Other, Q1H PRN, Kajal Chong APRN CNP    linezolid (ZYVOX) tablet 600 mg, 600 mg, Oral or Feeding Tube, Q12H, Velez Reyes, German, MD, 600 mg at 07/01/24 1237    [Held by provider] magnesium chloride CR tablet 1,070 mg, 1,070 mg, Oral, Once per day on Sunday Tuesday Thursday Saturday, Josesito Pratt MD    magnesium sulfate 2 g in 50 mL sterile water intermittent infusion, 2 g, Intravenous, Q8H PRN, Hardy Tran APRN CNS    metoprolol tartrate (LOPRESSOR) tablet 25 mg, 25 mg, Oral, BID, Velez Reyes, German, MD, 25 mg at 07/01/24 0847    micafungin (MYCAMINE) 150 mg in sodium chloride 0.9 % 100 mL intermittent infusion, 150 mg, Intravenous, Q24H, Chio Cortez MD, Last Rate: 100 mL/hr at 07/01/24 0201, 150 mg at 07/01/24 0201    midodrine (PROAMATINE) tablet 15 mg, 15 mg, Oral, TID w/meals, Kajal Chong APRN CNP, 15 mg at 07/01/24 1237    minocycline (MINOCIN) 100 mg in sodium chloride 0.9 % 100 mL intermittent infusion, 100 mg, Intravenous, Q12H, Kajal Chong APRN CNP, Last Rate: 100 mL/hr at 07/01/24 1237, 100 mg at 07/01/24 1237    montelukast (SINGULAIR) tablet 10 mg, 10 mg, Oral, At Bedtime, Velez Reyes, German, MD, 10 mg at 06/30/24 2125    naloxone (NARCAN) injection 0.2 mg, 0.2 mg, Intravenous, Q2 Min PRN **OR** naloxone (NARCAN) injection 0.4 mg, 0.4 mg, Intravenous, Q2 Min PRN **OR** naloxone (NARCAN) injection 0.2 mg, 0.2 mg, Intramuscular, Q2 Min PRN **OR** naloxone (NARCAN) injection 0.4 mg, 0.4 mg, Intramuscular, Q2 Min PRN, Josesito Pratt MD    pantoprazole (PROTONIX) 2 mg/mL suspension 40 mg, 40 mg, Per Feeding Tube, Daily, Josesito Pratt MD, 40 mg at 07/01/24 1247    phenylephrine (DANDY-SYNEPHRINE) 50 mg in NaCl 0.9 % 250 mL infusion, 0.1-6 mcg/kg/min (Dosing Weight), Intravenous, Continuous, Chio Cortez,  MD, Last Rate: 7.6 mL/hr at 07/01/24 1825, 0.5 mcg/kg/min at 07/01/24 1825    polyethylene glycol (MIRALAX) Packet 17 g, 17 g, Oral or Feeding Tube, Daily PRN, Velez Reyes, German, MD    posaconazole (NOXAFIL) DR tablet TBEC 300 mg, 300 mg, Per Feeding Tube, Daily, Josesito Pratt MD, 300 mg at 07/01/24 1237    POST-filter replacement solution for CVVHD & CVVHDF (Phoxillum BK4/2.5), , CRRT, Continuous, Jonna Rodas MD, Last Rate: 200 mL/hr at 06/30/24 2321, New Bag at 06/30/24 2321    potassium chloride 20 mEq in 50 mL intermittent infusion, 20 mEq, Intravenous, Q8H PRN, Hardy Tran APRN CNS, Last Rate: 50 mL/hr at 06/28/24 0556, 20 mEq at 06/28/24 0556    PRE-filter replacement solution for CVVHD & CVVHDF (Phoxillum BK4/2.5), 12.5 mL/kg/hr, CRRT, Continuous, Jonna Rodas MD, Last Rate: 700 mL/hr at 07/01/24 1734, 12.5 mL/kg/hr at 07/01/24 1734    [Held by provider] predniSONE (DELTASONE) tablet 5 mg, 5 mg, Oral, Daily, Velez Reyes, German, MD, 5 mg at 06/21/24 0822    prochlorperazine (COMPAZINE) injection 10 mg, 10 mg, Intravenous, Q6H PRN **OR** prochlorperazine (COMPAZINE) tablet 10 mg, 10 mg, Oral, Q6H PRN, 10 mg at 06/30/24 1954 **OR** prochlorperazine (COMPAZINE) suppository 25 mg, 25 mg, Rectal, Q12H PRN, Velez Reyes, German, MD    protein modular (PROSOURCE TF20) packet 1 packet, 1 packet, Per Feeding Tube, BID, Awa Watt MD, 1 packet at 07/01/24 0847    QUEtiapine (SEROquel) tablet 25 mg, 25 mg, Oral or Feeding Tube, BID PRN, Jailyn Lou MD, 25 mg at 07/01/24 1627    QUEtiapine (SEROquel) tablet 50 mg, 50 mg, Oral or Feeding Tube, At Bedtime, Kajal Chong APRN CNP, 50 mg at 06/30/24 2125    ramelteon (ROZEREM) tablet 8 mg, 8 mg, Oral, At Bedtime, Velez Reyes, German, MD, 8 mg at 06/30/24 2125    senna-docusate (SENOKOT-S/PERICOLACE) 8.6-50 MG per tablet 1 tablet, 1 tablet, Oral or Feeding Tube, Daily PRN, Velez Reyes, German, MD    sodium chloride (NEBUSAL) 3 % neb  solution 3 mL, 3 mL, Nebulization, BID, Velez Reyes, German, MD, 3 mL at 07/01/24 1122    sodium chloride (PF) 0.9% PF flush 10 mL, 10 mL, Intracatheter, Q8H, Kajal Chong APRN CNP, 10 mL at 07/01/24 0956    sodium chloride (PF) 0.9% PF flush 10 mL, 10 mL, Intracatheter, Q8H, Kajal Chong APRN CNP, 10 mL at 06/30/24 1614    sodium chloride (PF) 0.9% PF flush 10-20 mL, 10-20 mL, Intracatheter, q1 min prn, Kajal Chong APRN CNP    sodium chloride (PF) 0.9% PF flush 10-20 mL, 10-20 mL, Intracatheter, q1 min prn, Kajal Chong APRN CNP    sodium phosphate 15 mmol in sodium chloride 0.9 % 250 mL intermittent infusion, 15 mmol, Intravenous, Q8H PRN, Hardy Tran APRN CNS, 15 mmol at 06/22/24 1952    sulfamethoxazole-trimethoprim (BACTRIM) 400-80 MG per tablet 1 tablet, 1 tablet, Oral or Feeding Tube, Daily, Josesito Pratt MD, 1 tablet at 06/30/24 2003    tacrolimus (GENERIC) suspension 0.8 mg, 0.8 mg, Oral or Feeding Tube, BID IS, 0.8 mg at 07/01/24 1757 **AND** [DISCONTINUED] tacrolimus (GENERIC) suspension 0.4 mg, 0.4 mg, Oral or Feeding Tube, QPM, Yenni Graham MD, 0.4 mg at 06/24/24 1855    valGANciclovir (VALCYTE) solution 450 mg, 450 mg, Per Feeding Tube, Daily, Rufina Huff CNP, 450 mg at 07/01/24 0843    Vitamin D3 (CHOLECALCIFEROL) tablet 50 mcg, 50 mcg, Oral, Once per day on Monday Wednesday Friday, Velez Reyes, German, MD, 50 mcg at 07/01/24 0900    Anticoagulation: SubQ Heparin, Heparin (for CRRT)    Allergies:  Allergies   Allergen Reactions    Isopropyl Alcohol Other (See Comments)     The alcohol burns the open areas on her skin when used as a skin prep for procedures    Vancomycin Other (See Comments)     Diffuse erythroderma with itching (improved with Benadryl) after receiving vancomycin over 1 hour. 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 Can use vancomycin in the future, but please give  over slower infusion time (at least 2 hours) and premedicate with Benadryl.       Social History:  Social History     Socioeconomic History    Marital status:      Spouse name: Not on file    Number of children: Not on file    Years of education: Not on file    Highest education level: Not on file   Occupational History    Not on file   Tobacco Use    Smoking status: Never    Smokeless tobacco: Never   Substance and Sexual Activity    Alcohol use: No     Alcohol/week: 1.0 standard drink of alcohol     Types: 1 Glasses of wine per week    Drug use: No    Sexual activity: Not on file   Other Topics Concern    Parent/sibling w/ CABG, MI or angioplasty before 65F 55M? No   Social History Narrative    3/6/2019 - Lives with . Has three daughters. Four grandchildren (two ). No pets. Travelled previously to [place]. Has visited [place] several times.      Social Determinants of Health     Financial Resource Strain: Low Risk  (2022)    [facility]    Overall Financial Resource Strain (CARDIA)     Difficulty of Paying Living Expenses: Not hard at all   Food Insecurity: No Food Insecurity (2022)    [facility]    Hunger Vital Sign     Worried About Running Out of Food in the Last Year: Never true     Ran Out of Food in the Last Year: Never true   Transportation Needs: No Transportation Needs (2022)    [facility]    PRAPARE - Transportation     Lack of Transportation (Medical): No     Lack of Transportation (Non-Medical): No   Physical Activity: Inactive (2022)    [facility]    Exercise Vital Sign     Days of Exercise per Week: 0 days     Minutes of Exercise per Session: 0 min   Stress: No Stress Concern  Present (9/20/2022)    [facility]    [place] [employer] - Occupational Stress Questionnaire     Feeling of Stress : Not at all   Social Connections: Moderately Integrated (9/20/2022)    Received from [facility], [facility]    Social Connection and Isolation Panel [NHANES]     Frequency of Communication with Friends and Family: Twice a week     Frequency of Social Gatherings with Friends and Family: Twice a week     Attends Zoroastrian Services: More than 4 times per year     Active Member of Clubs or Organizations: No     Attends Club or Organization Meetings: Not on file     Marital Status:    Interpersonal Safety: Not At Risk (4/15/2024)    Received from [facility]    Intimate Partner Violence     Are you in a relationship where you are physically hurt, threatened and/or made to feel afraid?: No   Housing Stability: Unknown (9/20/2022)    Received from [facility], [facility]    Housing Stability     In the last 12 months, was there a time when you were not able to pay the mortgage or rent on time?: No     Number of Places Lived in the Last Year: Not on file     Number of Places Lived in the Last Year (Outpatient): Not on file     In the last 12 months, was there a time when you did not have a steady place to sleep or slept in a shelter (including now)?: No       FHx:  Family History   Problem Relation Age of Onset    Hypertension Mother     Arthritis Mother     Pancreatic Cancer Father     Diabetes Father        ROS:  12-point review of systems is otherwise negative    Vitals:  Vitals:    07/01/24 1745 07/01/24 1800 07/01/24 1815 07/01/24 1825   BP:       Pulse: 97 99 99 114   Resp: (!) 36 (!) 38     Temp:       TempSrc:       SpO2: 100% 100% 100% 100%   Weight:       Height:           PE:  Gen: Lying in chair,  patient is not obese  Head: Normocephalic, atraumatic  Face: No obvious assymetry  Eyes: Noninjected  Ears: Normal externally  Nose: No rhinorrhea or epistaxis  OC/OP: ETT in place   Neck: No cervical adenopathy or thyromegaly, laryngeal framework is easy to palpate, no significant suprastomal pulsation, patient is able to adequately extend, no lines in neck  CV: RRR  Airway: On mechanical ventilation; settings include 5/5 on 45% FiO2, RR 18,     Pertinent Labs:     CBC  Recent Labs   Lab 07/01/24  0346 06/30/24  0332 06/29/24  0359 06/28/24  0427   WBC 7.8 6.3 4.8 4.4   RBC 2.94* 2.95* 2.58* 2.64*   HGB 9.0* 8.9* 7.8* 8.0*   HCT 29.4* 29.1* 25.1* 25.6*    99 97 97   MCH 30.6 30.2 30.2 30.3   MCHC 30.6* 30.6* 31.1* 31.3*   RDW 17.3* 17.0* 16.8* 17.1*   * 126* 111* 98*     INRNo lab results found in last 7 days.    Pertinent Imaging:   Exam: Chest CT without contrast 6/29/2024 1:41 PM  History: Acute hypoxic respiratory failure.      Comparison: Chest radiograph 6/26/2024. Multiple prior chest CTs most  recent dated 6/18/2024.     Technique: Helical CT imaging of the chest was obtained without the  administration of intravenous contrast.     Findings:  Devices: Right internal jugular central venous catheter tip terminates  in the superior vena cava. Endotracheal tube tip terminates in the  midthoracic trachea. Feeding tube tip is postpyloric and terminates in  the proximal jejunum. Median sternotomy.     Chest:   Mediastinum: The thyroid, pulmonary trunk, thoracic aorta, heart, and  esophagus are normal. No thoracic adenopathy.      Lungs: Status post bilateral lung transplant. Accessory right upper  lobe. Bronchial stent in the right mainstem bronchus and proximal  intermedius. Central trachea is clear. Small left and trace right  pleural effusions. No pneumothorax. Multifocal confluent groundglass  opacities throughout both lungs, with consolidative reticular  opacities in the lung bases.  Suggestion of crazy paving in the lung  bases. Rim calcified pleural plaque in the posteromedial aspect of the  left lower lobe. Right hilar calcification.     There is no consolidation, ground glass opacity, pneumothorax, or  pleural effusion.      Upper abdomen: Calcified gallstones. Splenic artery calcifications.  Craniocaudal dimension of the spleen measures 14.7 cm. Trace ascites.     Bones/soft tissues: No acute fracture or dislocation. Chronic  compression type deformity of the T5 vertebral body. Midline  sternotomy.                             Impression:   1. Multifocal panlobar opacities bilaterally; differential includes  multifocal infectious pneumonia (including atypicals), alveolar  pulmonary edema, recurrent interstitial lung disease, medication side  effect, and chronic lung allograft dysfunction (CLAD).  2. Small left and trace right pleural effusions.  3. Moderate splenomegaly.  4. Cholelithiasis.  5. Trace ascites.    Assessment:  63 year old female with PMH ILD s/p b/l lung transplant (2018) c/b R bronchial stenosis & pneumonia, EBV & CMV viremia, ESRD on HD who presented 6/18/24 for acute hypercapnic respiratory failure now with prolonged respiratory failure and is an appropriate candidate for tracheotomy    Plan:  -will discuss scheduling of tracheotomy with staff (optimal plan to schedule for Wednesday PM) and communicate this to the primary team  -will obtain appropriate consent  -patient will need to be off anticoagulation the night prior to surgery, as well as NPO after midnight    Plan discussed with Dr. Maurice and staff on-call Dr. Noe Singh MD  Otolaryngology-Head & Neck Surgery - PGY-1  To contact ENT please dial [phone] and enter job code [phone].

## 2024-07-01 NOTE — PROGRESS NOTES
CRRT STATUS NOTE    DATA:  Time:  1800  Pressures WNL:  YES  Filter Status:  WDL    Problems Reported/Alarms Noted:  none reported    Supplies Present:  YES    ASSESSMENT:  Patient Net Fluid Balance:  net negative 677.44 since midnight    Vital Signs:  Temp: 98.4  F (36.9  C) Temp src: Axillary BP: (!) 140/56 Pulse: 114   Resp: (!) 38 SpO2: 100 % O2 Device: Mechanical Ventilator            Labs:  K-3.8, Mg-2.4, Phos-4.7, iCa-4.3. Hgb-9               Goals of Therapy:     INTERVENTIONS:   Net negative 0-50 ml/hour    PLAN:  Continue crrt per plan of care

## 2024-07-01 NOTE — PROGRESS NOTES
Physician Attestation     I saw and evaluated Kecia Blue as part of a shared APRN/PA visit.     I personally reviewed the vital signs, medications, labs, and imaging.    I personally provided a substantive portion of care for this patient and I approve the care plan as written by the MARIA A.  I was involved with Medical Decision Making including: Please see A&P for additional details of medical decision making.    Ms. 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  She also has history of EBV viremia status post rituximab, recurrent CMV viremia, history of nocardia infection, history of pneumocystis pneumonia [2021], ESRD on hemodialysis.  She also has history of leukopenia liver dysfunction with history of portal hypertension, paroxysmal A-fib, Raynaud's and anxiety.      She was admitted on June 18 with progressive hypoxia and shortness of breath.  This required intubation.  Most likely etiology was post active pneumonia secondary to right mainstem stenosis.  She underwent bronchoscopy on June 20 with laser tissue debulking and airway balloon dilation of the right mainstem and bronchus intermedius.    As stent was placed in the right mainstem to BI with a bifurcating stent into the right upper lobe.    Due to ongoing issues with hypoxemia and respiratory failure she required CRRT starting on June 20 for volume removal.  She had recurrence of paroxysmal A-fib with RVR on June 25.     With CRRT her volume status improved significantly and is close to baseline. Unfortunately remains intubated on the ventilator.  She has been able to do pressure support trials though it has been very limited maybe  about an hour at the most in one setting.    Her CT scan done on June 29 showed bilateral groundglass opacities left greater than the right.  The etiology of groundglass opacity was unclear but ddx includes rejection vs. infection vs. volume overload. She has been on two drug IS regimen for some time. Her cell free DNA was positive on 6/19 and Chest CT from 6/18 did not show these GGO's. CMV PCR was detectable on June 25 making this unlikely to be due to CMV pneumonitis.   Her immuno was 87. Low immuknow makes rejection less likely. Based on CXR the haziness was worse on 6/21 and it has slowly improved. This coincided with significant weight gain.    Due to slow progress on PST, Trach placed by ENT on 7/3/2024.    Pt is awake and follows simple commands. Discussed with Nephrology about her volume status. Pt's current weight is below her dry weight and clinical exam is not supportive of volume overload.    Plan:  - Repeat cell free DNA on July 3rd 2024 assuming the imaging shows persistent haziness.  - Recommend Bronch/BAL - done on 7/3/24 of lingula. Follow cultures/cytology  - DSA repeat on 7/3/24 pending.  - Based on the results of the above will consider steroid burst/taper.    - Consider PEG/J next week based on pt's clinical course.  - Her bronch cultures on June 24 grew steno,  VRE, Candida. Has grown Aspergillus in the past.  Transplant ID consulted and is on IV ceftaz, linezolid and minocycline.  The planned end date is July 5.  - She was chronically on posaconazole for Aspergillus and IV micafungin has been added during this stay.  - She will remain on 2 drug immunosuppression due to leukopenia and recurrent infections.  She is on tacrolimus with a goal level of 8-10 and prednisone 5 mg daily [she is on hydrocortisone for stress dose coverage at this time]. She remains on PTA Singulair, azithromycin and Breo inhaler.    Tarikaleja Christensen MD  Date of Service (when I saw the patient): 07/03/24

## 2024-07-01 NOTE — PROGRESS NOTES
MEDICAL ICU PROGRESS NOTE  07/01/2024      Date of Service (when I saw the patient): 07/01/2024    ASSESSMENT: Kecia Blue is a 63 year old female with PMH ILD s/p bilateral lung transplant (2018) c/b recurrent right bronchial stenosis s/p repeat balloon dilation/stenting, repeat opportunistic pneumonia (PJP 2021, aspergillus/stenotrophomonas 11/2023) and viremias (EBV, CMV), and ESRD 2/2 tacrolimus toxicity on HD who was admitted on 6/18/2024 for acute hypercapnic respiratory failure 2/2 tracheal stenosis and pneumonia. Hospital course complicated by hypotension, delirium, and prolonged respiratory failure.    Changes today  -Change BiPAP settings for SBT trials to 10/5  -Consult to ENT for trach this week, interventional pulm unable to perform due to scar tissue from previous trach  - Interventional pulm to bronch patient tomorrow for bronchial stent evaluation  - Discontinue fentanyl gtt and switch to IV fentanyl prn for improved level of arousal during the day  - Add seroquel 25 mg prn BID for anxiety  - NPI q4h for 3-5 days to determine if periods of decreased arousal related to sedation medications    PLAN:    Neuro:  # Pain   - Acetaminophen 325mg q4H  - Discontinue fentanyl gtt and switch to IV fentanyl prn for improved level of arousal during the day    # Sedation  - RAAS goal 0 to -1  - Precedex gtt - wean as able  - Daily sedation vacation    # Toxic metabolic encephalopathy, improving  Presented with 2-3 days of lethargy, decreased appetite, and fatigue. Previously has improved with BiPAP/intubation. Ongoing lethargy in setting of sepsis and delirium in the setting of high-dose steroid therapy. Has been hallucinating, reportedly seeing snakes (6/28). Plan to wean down on precedex gtt (see above).  - Quetiapine 50 mg at bedtime and 25 mg BID prn for anxiety  - Ramalteon for sleep   - Weaning stress dose steroids as tolerated  - NPI q4h for 3-5 days to determine if periods of decreased arousal  related to sedation medications      Pulmonary:  # Acute on chronic hypoxic hypercapnic respiratory failure  # HAP, Stenotrophomonas maltophilia, Enterococcus faecalis, and Candida guilliermondi complex  # Severe pulmonary edema  # Acute respiratory distress syndrome, resolving  Presented to the ED on 6/18/2024 with acute on chronic hypoxic hypercapnic respiratory failure. Transferred to MICU and intubated on overnight on 6/18. Workup significant for Stenotrophomonas, Enterococcus, and Candida pneumonia. Course was c/b progression to ARDS (6/21-22) with elevated plateau pressures. Pt remain intubated d/t ongoing pulmonary edema iso ESRD on iHD. CXR (6/26) with similar pulmonary opacities compared to 6/24, left > right.    - Antibiotics, as below  - Volume management with CRRT, as below  - Mechanical ventilation, wean as able, meets extubation readiness  - Daily SBT (AM and PM)  - Did not tolerate today due to increased RR, may have anxiety component to performance  - Patient agreeable to trach, ENT consulted for trach this week as IP unable to perform trach due to scar tissue from previous trach.  - Once trach in place, pulm tx service agreeable with PEG/J tube placement for feeding.  - Pulmonary toilet  - Mucomyst BID  - Hypertonic saline BID, alternate with mucomyst  - Albuterol neb QID  - Did not tolerate volera    Vent Mode: CMV/AC  (Continuous Mandatory Ventilation/ Assist Control)  FiO2 (%): 45 %  Resp Rate (Set): 18 breaths/min  Tidal Volume (Set, mL): 320 mL  PEEP (cm H2O): 5 cmH2O  Pressure Support (cm H2O): 12 cmH2O  Resp: (!) 36    # Recurrent right middle bronchus stenosis s/p dilation and stent (6/20/2024)  Has history (bronchoscopy 2/15/24) demonstrating narrowing of right mainstem transplant anastomosis and bronchus intermedius. CT chest (6/18/2024) and bronchoscopy (6/19/2024) demonstrated occlusion of right middle bronchus. With concern for post-obstructive pneumonia, interventional pulmonology was  consulted, and completed bronchoscopy (6/20/2024) with tissue debulking, right middle bronchus balloon dilation to 11 mm, stent placement in right main bronchus, and bifurcating cast placement in right upper bronchus. Plan for saline nebs BID and outpatient bronchoscopy in 6 weeks.   - Interventional pulmonology consult, appreciate recommendations  - Hypertonic nebs BID  - Discharge with minimum of saline nebs BID  - IP to bronch patient tomorrow to evaluate bronchial stent     # Hx ILD 2/2 anti-synthetase syndrome s/p BSLT 3/2018 c/b CLAD  - Transplant pulm consult, appreciate recs  - Tacrolimus level (6/27) was 4.5 (low)  - PTA nebs  - Fluticasone-viilanterol (breo ellipta) every day - HOLD with respiratory infection  - Montelukast every day   - Immunosuppressants per Tx Pulm:   - PTA Tacrolimus (dosing per tx pulm)  - PTA Prednisone 5 mg daily - HOLD with stress dose steroids  - PTA azathioprine - HOLD per Transplant pulmonology with repeat infections    Cardiovascular:  # Septic shock, persistent   On 6/19/2024, developed sepsis (hypotension 80s/50s, tachypnea 24) in the setting of stenotrophomonas pneumonia/enterococcus faecium VRE UTI. Etiology of shock likely distributive iso sepsis; less likely hypovolemic (not pulling volumes with CRRT), medication-associated (weaned off of propofol gtt) or obstructive (low suspicion for PE, echo 6/19 without evidence of cardiac dysfunction). Has been on and off pressors during course of ICU stay.    - IV pressors, wean as able   - Phenylephrine gtt in setting of blood pressure support from midodrine  - MAP goal >65  - Stress dose steroids  - Hydrocortisone 50mg q6H (6/21-6/26); 50 mg BID (6/26-6/28); 25 mg BID (6/28-x)  - Fludrocortisone 0.1 mg qday (6/21-6/28); 0.05 mg qday (6/28-30)  - If hemodynamically stable, consider transitioning to PTA prednisone at increased 40 mg daily (then titrate to 5 mg)  - Continue midodrine 15mg TID for pressor weaning   - Check bedside  POCUS to evaluate for cardiac cause of shock    # Demand Ischemia, resolved    Presented with elevated troponin (peak 499). Not associated with chest pain or hypoxia. EKG without ST elevations/depressions or T wave inversions. Suspect demand ischemia in the setting of sepsis. Will continue to monitor symptoms and continuous telemetry.      # Paroxysmal A-Fib, improved  # Pulmonary Hypertension   History of pulmonary hypertension (45 mmHg, echo 10/2023) in the setting of ILD and paroxysmal afib on PTA metoprolol tartrate BID. Not on anticoagulation for afib.   - PTA metoprolol tartrate 25mg BID     GI/Nutrition:  # Nutrition  NJT placed (6/19/2024).   - Nutrition consult  - Tube feeds 30ml/hr; at goal    # Diarrhea, resolved  On 6/21, had 8 bowel movements. Occurred in the setting of scheduled bowel regimen and starting new antibiotics (meropenem, levofloxacin). C diff negative.   - Bowel regimen  - Miralax daily PRN   - Senna BID    # C/f cholecystitis vs choledocholithiasis/distal biliary obstruction  # Cholelithiasis with gallbladder wall thickening  # Elevated bili  # Elevated alk phos, resolved  Admission with elevated alkaline phosphatase (237) with elevated GGT consistent with hepatic etiology, resolved. On 6/27, pt endorsed RUQ pain with palpation and had elevated body temps (99-100F) on CRRT. LFTs and bili (6/27) was also noted have been trending upwards over the past few days. Labs on 6/28 improved, but RUQ U/S (6/28) showed gallbladder wall thickening measuring up to 6 mm with shadowing gallstones present and pericholecystic fluid; common bile duct dilated to 11 mm. Findings c/w cholelithiasis with c/f cholecystitis vs choledocholithiasis/distal biliary obstruction. Lipase negative   - T.Bili 0.8- low concern for obstruction  - Consult GI, appreciate recs  - MRCP completed showing borderline dilated CBD up to 1.1 cm, no significant intrahepatic biliary dilation, no evidence of choledocholithiasis, no  evidence of acute cholecystitis.    Renal/Fluids/Electrolytes:  # ESRD on iHD (M,W,F)  History of ESRD 2/2 Tacrolimus toxicity on HD (MWF). With soft blood pressures, placed RIJ (6/20/2024) and started CRRT (6/20/2024).  - Nephrology consult  - CRRT: aim for net negative 500ml  - Renally-dosed medications  - RN electrolyte replacement    Endocrine:  # Risk for hyperglycemia with stress-dose steroids  Given high BG in high 200s on stress dose steroids and fludrocortisone, started on insulin gtt on 6/25. Overnight on 6/26, BG dropped to 77, pt given IV bolus of D50W and sugars corrected. Hypoglycemic episode likely iso titrating down the stress dose steroids. S/p insulin gtt (6/25-27).   - HDSSI  - NPH 8U BID     ID:  # HAP, Stenotrophomonas maltophilia, Enterococcus faecalis, Aspergillus, and Candida guilliermondi complex  Presented to the ED on 6/18/2024 with SOB and hypercapnic respiratory failure. Found on bronchoscopy (6/19) to have RML obstruction by narrowing bronchus intermedius as well as copious mucopurulent secretions/mucus plugging. Previously treated for Stenotrophonomas/aspergillus pneumonia in 11/2023 with subsequent sputum culture (2/2024) negative for both Stenotrophonomas/Aspergillus. Etiology likely repeat Stenotrophomonas infection vs opportunistic infection from colonized microbe.   - Workup  - 6/18 sputum cx: Stenotrophomonas maltophilia (ceftazidime and levofloxacin R)  - 6/19 BAL cx: Stenotrophomonas maltophilia, Enterococcus faecalis (gentamicin R), Aspergillus (speciation in process)  - 6/21 BAL cx: Stenotrophomonas maltophilia and Enterococcus faecalis VRE  - 6/24 sputum cx: Stenotrophomonas maltophilia, Enterococcus faecalis VRE, and Candida guilliermondi complex  - Transplant pulmonology consult, appreciate recs  - Transplant ID consult, appreciate recs  - Present antibiotics  - IV minocycline (6/20-x)  - PO linezolid (6/25-x)  - IV micafungin (6/25-x) - until Aspergillus speciation  results, still pending  - Past antibiotics  - S/p ceftazidime (6/25-6/28)    - S/p vancomycin (6/18-6/20)   - S/p zosyn (6/18-21)  - S/p Daptomycin (6/21-6/23)  - S/p Meropenem (6/21-6/24)  - S/p Levofloxacin (6/21-6/23)    # Pyuria, Enterococcus faecium VRE and  ESBL E. Coli   Asymptomatic. With progressive IV pressor needs, obtained broad infectious workup which demonstrated UCx (6/19/2024) with Enterococcus faecium VR and ESBL E. Coli. S/p Daptomycin (6/21-6/23) and Meropenem (6/21-6/24).    # Hx Aspergillus PNA (11/2023)  # Hx PJP PNA (1/2021)  # Hx CMV viremia, recurrent  # Hx EBV viremia  - Transplant ID consult, appreciate recs  PTA posaconazole (Treatment for hx of aspergillus PNA)  PTA azithromycin 250mg qd (CLAD ppx)  PTA bactrim (PJP ppx)     Hematology:    # Acute on Chronic Normocytic Anemia, stable  # Thrombocytopenia, chronic  History of chronic anemia in the setting of kidney disease (baseline Hgb 10-11). Upon admission, Hgb 9. May be bone marrow suppression in the setting of chronic illness; potential contribution by blood loss with CRRT filter changes (~150-200c). Peripheral smear (6/18/2024) without evidence of schistocytes.  - Hgb 9.0 today  - Continue to monitor CBC    Musculoskeletal:  - PT / OT consult     Skin:  - No acute concerns.     General Cares/Prophylaxis:    DVT Prophylaxis: Heparin SQ  GI Prophylaxis: PPI  Restraints: None  Family Communication: , will update over phone or in person  Code Status: Full Code     Lines/tubes/drains:  - PIV x2, midline  - RIJ  - A line  - NGT  - ETT    Disposition:  - Medical ICU     Patient seen and findings/plan discussed with medical ICU staff, Dr. James.    Edin Davis MD  Internal Medicine Resident, PGY-1      Clinically Significant Risk Factors          # Hypocalcemia: Lowest iCa = 4.2 mg/dL in last 2 days, will monitor and replace as appropriate     # Hypoalbuminemia: Lowest albumin = 2.2 g/dL at 6/21/2024  4:26 PM, will  monitor as appropriate   # Thrombocytopenia: Lowest platelets = 126 in last 2 days, will monitor for bleeding                 #Precipitous drop in Hgb/Hct: Lowest Hgb this hospitalization: 6.9 g/dL. Will continue to monitor and treat/transfuse as appropriate.      # Moderate Malnutrition: based on nutrition assessment    # Financial/Environmental Concerns: none                  ====================================  INTERVAL HISTORY:   Patient with increased anxiety during SBT per RN. Patient reports feeling SOB and nervous about getting enough air during SBTs.  feels patient too drowsy during the daytime.  at bedside and updated.       OBJECTIVE:   1. VITAL SIGNS:   Temp:  [97.1  F (36.2  C)-98.4  F (36.9  C)] 98.2  F (36.8  C)  Pulse:  [] 74  Resp:  [18-36] 36  BP: (118-140)/(49-56) 140/56  MAP:  [52 mmHg-92 mmHg] 92 mmHg  Arterial Line BP: ()/(37-63) 144/63  FiO2 (%):  [45 %-100 %] 45 %  SpO2:  [90 %-100 %] 98 %  Vent Mode: CMV/AC  (Continuous Mandatory Ventilation/ Assist Control)  FiO2 (%): 45 %  Resp Rate (Set): 18 breaths/min  Tidal Volume (Set, mL): 320 mL  PEEP (cm H2O): 5 cmH2O  Pressure Support (cm H2O): 12 cmH2O  Resp: (!) 36    2. INTAKE/ OUTPUT:   I/O last 3 completed shifts:  In: 2403.7 [I.V.:943.7; NG/GT:740]  Out: 3405.5 [Other:3165.5; Stool:240]    3. PHYSICAL EXAMINATION:  General: Laying in bed, NAD   HEENT: Atraumatic, moist mucous membranes   Pulm/Resp: Ongoing coarse breath sounds. Mild expiratory wheezing. Good air movement throughout.   CV: RRR, no m/r/g   Abdomen: Soft, non-distended, tender to palpation all quadrants.   Ext: Trace bilateral LE edema, pulses 2+ radial, pedal  Incisions/Skin: No rashes or lesions  Neuro: Awake, follows 1-step and 2-step commands, moving all extremities    4. LABS:   Arterial Blood Gases   Recent Labs   Lab 06/30/24  1537 06/26/24  0310 06/25/24  1209 06/24/24  2126   PH 7.38 7.35 7.29* 7.33*   PCO2 41 44 51* 45   PO2 154* 137* 60*  133*   HCO3 24 24 24 24     Complete Blood Count   Recent Labs   Lab 07/01/24  0346 06/30/24 0332 06/29/24 0359 06/28/24 0427   WBC 7.8 6.3 4.8 4.4   HGB 9.0* 8.9* 7.8* 8.0*   * 126* 111* 98*     Basic Metabolic Panel  Recent Labs   Lab 07/01/24  1251 07/01/24  0955 07/01/24  0353 07/01/24 0346 06/30/24  1537 06/30/24  1527 06/30/24 0338 06/30/24 0332 06/29/24 2013 06/29/24  1604   NA  --   --   --  138  --  137  --  137  --  139   POTASSIUM  --   --   --  3.6  --  3.8  --  3.9  --  3.7   CHLORIDE  --   --   --  107  --  106  --  107  --  108*   CO2  --   --   --  22  --  21*  --  21*  --  23   BUN  --   --   --  28.1*  --  28.1*  --  29.0*  --  30.5*   CR  --   --   --  0.89  --  0.88  --  0.96*  --  1.06*   * 117* 155* 170*   < > 173*   < > 192*   < > 154*    < > = values in this interval not displayed.     Liver Function Tests  Recent Labs   Lab 07/01/24  0346 06/30/24  1527 06/30/24 0332 06/29/24  1604 06/29/24 0359 06/28/24  1646 06/28/24 0427   AST 25  --  26  --  22  --  16   ALT 36  --  38  --  36  --  32   ALKPHOS 173*  --  178*  --  146  --  122   BILITOTAL 0.6  --  0.8  --  0.8  --  0.7   ALBUMIN 2.6* 2.7* 2.6* 2.5* 2.5*   < > 2.3*    < > = values in this interval not displayed.     Coagulation Profile  No lab results found in last 7 days.      5. RADIOLOGY:   No results found for this or any previous visit (from the past 24 hour(s)).

## 2024-07-01 NOTE — PROGRESS NOTES
Bilateral unsecured mitt restraints continued 7/1/2024    Clinical Justification: Pulling lines, pulling tubes, and pulling equipment  Less Restrictive Alternative: 1:1 patient care, Repositioning, Disguise equipment, Alarm, De-escalation, Reorientation  Attending Provider Notified: Yes, Attending Provider's Name: MICU attending   New orders placed: Yes  Length of Order: 1 Day    Pt intermittently raising arms and reaching for ETT and ND tube; redirectable, but mitts continued for safety.     Nancy Lucero RN

## 2024-07-01 NOTE — PROGRESS NOTES
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  The patient was admitted on 24 for progressive hypoxia with dyspnea and worsening hypercapnia in ED requiring intubation likely d/t post-obstructive PNA 2/2 right bronchus stenosis.  Bronch confirming severe stenosis of right anastomosis with extensive RLL plugging.  IP bronch () with laser tissue debulking RMB stenosis, airway balloon dilation of RMB and BI, and placement of stent RMB to BI and bifurcating stent in RUL.  S/p volume resuscitation and transition to CRRT  for hypotension.  Increased agitation/delirium on  with increasing desaturation led to need for increased sedation.  Recurrence of paroxysmal afib with RVR first noted .  Remains intubated with pulmonary edema on imaging and septic shock requiring pressor support, poor tolerance of PST.        Today's recommendations:  - Awaiting susceptibilities on Aspergillus per transplant ID  - Continue antimicrobial regimen per ID  - Repeat bronchoscopy in coordination with IP  - Recommend trach and PEG/J placement  - Tacrolimus therapeutic today, no change, repeat level ordered   - Prednisone chronic dosing to resume if hydrocortisone <20 mg daily  - CMV weekly monitoring (qT), continue VGCV  ppx with tentative plan for 3 weeks beyond completion of stress dose steroids      Septic shock:  Acute on chronic hypoxic/hypercapneic respiratory failure:  Post-obstructive multi-lobar PNA:  Right bronchial stenosis with RML collapse: Admitted with 2 weeks of progressive hypoxia, dyspnea, congested cough, and fatigue.  Chronic hypoxia with 2L NC, increased from 3 to 4L over this time with acute decompensation on day of admission associated with hypercapnia.  VBG 7/17/85 in ED s/p intubation.  IP bronch 2/15 s/p tissue debulking without stent placement.  Negative ID workup: respiratory panel, COVID, MRSA nares, PJP, Legionella, Strep pneumoniae, cocci, Histo, CrAg, fungitell x2, and A. galactomannan (blood).  IgG WNL.  Procal mildly elevated at 0.24 initially (then elevated to 1.77 6/20), LA normal, but febrile to 100.7.  TTE on admission grossly normal.  CT with RUL/RLL consolidative opacities, RML collapse, and narrowing of BI and distal RLL bronchus.  Started on norepi 6/19.  Bronch per MICU (6/19) with severe stenosis starting at right anastomosis, inspection with smaller scope revealing for extensive RLL mucous plugging.  Bronch per IP (6/20) with laser tissue debulking RMB stenosis, airway balloon dilation of RMB and BI, and placement of stent RMB to BI and bifurcating stent in RUL.  Intermittent/low grade fevers persisting.  Respiratory cultures with Steno, VSE, VRE, Aspergillus ochraceus, and Elidia guilliermondii.  Chest CT (6/29) with multifocal panlobar opacities and small bibasilar pleural effusions.  Remains on full vent support, poor tolerance of sustained PST.    - ABX: linezolid (6/25), and minocycline (6/20, Steno) per transplant ID; s/p ceftazidime (6/25-6/28), meropenem (6/21-6/24), daptomycin (6/21-6/24), levofloxacin (6/21-6/23), Zosyn (6/18-6/21), azithromycin 500 mg daily (6/18-6/20), and vancomycin (6/18)   - Nebs: levalbuterol QID, Mucomyst 10% BID alternating with 3% HTS (6/22), will need  to be on NS nebs BID upon discharge per IP  - Vent management and weaning per MICU  - Stress dose steroids per MICU (started 6/21)  - Repeat bronchoscopy in coordination with IP  - Recommend trach and PEG/J placement     S/p bilateral sequential lung transplant (BSLT) for ILD:   Progressive CLAD: Most recent OP visit in February.  DSA negative 6/19 (last positive noted 2018).  Repeat ImmuKnow (6/19) 87, very low but IST adjustment deferred per Dr. Graham given acuity and steroids (after ImmuKnow level).  Repeat Prospera remains high at 2.3 on 6/19, may be artificially elevated with current infection, but also notably high at 2.42 on 2/14.  - PTA Singulair, azithromycin, and Breo inhaler (resume once extubated)     Immunosuppression:  On 2-drug IST since November d/t leukopenia and recurrent infections  - Tacrolimus 0.8 mg BID (increased 6/29).  Goal level 8-10.  Level today 8.7, no change.  Repeat level ordered 7/5.  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  - ABX and infectious work up as above     Aspergillus ochraceus:  H/o Aspergillus empyema:  S/p empyema drainage and ampho B instillation.  Previously on voriconazole, transitioned to posaconazole and managed by transplant ID as OP with last visit 3/13.  Calcified fungal elements remain with additional recurrent effusion (likely associated with fluid disturbances r/t iHD), but surgical intervention previously deferred d/t risk.  Posaconazole level 2.5 on 6/19.  - Awaiting susceptibilities on Aspergillus (bronch culture 6/19) per transplant ID  - Posaconazole 300 mg daily chronically, micafungin 150 mg daily (6/25) per transplant ID     H/o CMV viremia: Recurrent CMV viremia historically.  VGCV ppx  "most recently stopped 4/12.  Recent CMV low positive at 46 (6/12), <35 (6/18), and 39 (6/25).  - CMV weekly monitoring (qTuesday) with steroid increase  - VGCV ppx (renally dosed) given stress dose steroids (6/24) with tentative plan for 3 weeks beyond completion of stress dose steroids      EBV viremia: S/p rituximab 12/13/23 x1 dose.  Most recent EBV negative 6/18.     Dilated CBD:  Suspected acute cholecystitis: MRCP (6/29) with CBD dilation with stones and cholelithiasis without cholecystitis.  GI signed off 6/30, recommending general surgery consult if RUQ pain persists.     ESRD on iHD: Kidney evaluation started as OP.  On qMWF iHD schedule, no missed sessions.  Transitioned to CRRT on 6/20, management per renal and MICU with fluid removal as able.      VRE urine culture: Noted 6/19, >100k GNB and VRE faecium, ABX as above with management per MICU team and transplant ID.     We appreciate the excellent care provided by the MICU team.  Recommendations communicated via LiveIntent message and this note.  Will continue to follow along closely, please do not hesitate to call with any questions or concerns.    Patient discussed with Dr. Christensen.    Hina Huff, DNP, APRN, CNP  Inpatient Nurse Practitioner  Pulmonary CF/Transplant     Subjective & Interval History:     PST 12/7/40 BID x40 minutes each yesterday, continued struggle with air hunger during PTS.  Minimal secretions.  Remains on one pressor, Precedex for sedation overnight and this morning.  Up to the chair for 3 hours yesterday.    Review of Systems:     ROS as above, otherwise significantly limited due to intubation and sedation.    Physical Exam:     All notes, images, and labs from past 24 hours (at minimum) were reviewed.    Vital signs:  Temp: 98.4  F (36.9  C) Temp src: Axillary BP: 118/49 Pulse: 75   Resp: 20 SpO2: 99 % O2 Device: Mechanical Ventilator Oxygen Delivery: 6 LPM Height: 160 cm (5' 3\") Weight: 51.6 kg (113 lb 12.1 oz)  I/O:   Intake/Output " Summary (Last 24 hours) at 7/1/2024 0749  Last data filed at 7/1/2024 0700  Gross per 24 hour   Intake 2457.43 ml   Output 3377.5 ml   Net -920.07 ml     Constitutional: Lying supine in bed, spouse at bedside, in no apparent distress.   HEENT: Eyes with pink conjunctivae, anicteric.  Orally intubated.  Left nare feeding tube.  PULM: Diminished bases bilaterally.  Coarse crackles, no rhonchi, no wheezes.  Non-labored breathing on full vent support.  CV: Normal S1 and S2.  RRR.  No murmur, gallop, or rub.  No peripheral edema.  Hands/feet cold, bilateral toes purple (consistent with Raynaud's flares per ).  ABD: NABS, soft, nontender, nondistended.    MSK: Moves all extremities.  + muscle wasting.   NEURO: Sedated, not conversant.   SKIN: Warm, dry, fragile.  Dry peeling at chest, no erythema.    PSYCH: Mood stable.     Data:     LABS    CMP:   Recent Labs   Lab 07/01/24  0353 07/01/24  0346 06/30/24  2354 06/30/24 2011 06/30/24  1537 06/30/24  1527 06/30/24  0338 06/30/24  0332 06/29/24 2013 06/29/24  1604 06/29/24  0405 06/29/24  0359 06/28/24  0432 06/28/24  0427   NA  --  138  --   --   --  137  --  137  --  139  --  138   < > 139   POTASSIUM  --  3.6  --   --   --  3.8  --  3.9  --  3.7  --  3.5   < > 3.4   CHLORIDE  --  107  --   --   --  106  --  107  --  108*  --  107   < > 110*   CO2  --  22  --   --   --  21*  --  21*  --  23  --  22   < > 21*   ANIONGAP  --  9  --   --   --  10  --  9  --  8  --  9   < > 8   * 170* 125* 154*   < > 173*   < > 192*   < > 154*   < > 199*   < > 229*   BUN  --  28.1*  --   --   --  28.1*  --  29.0*  --  30.5*  --  28.2*   < > 29.3*   CR  --  0.89  --   --   --  0.88  --  0.96*  --  1.06*  --  0.88   < > 0.82   GFRESTIMATED  --  73  --   --   --  74  --  67  --  59*  --  74   < > 81   CHELSEY  --  7.8*  --   --   --  7.9*  --  7.4*  --  7.2*  --  7.5*   < > 6.9*   MAG  --  2.5*  --   --   --  2.5*  --  2.4*  --  2.4*  --  2.4*   < > 2.2   PHOS  --  4.7*  --   --   --   "4.6*  --  4.8*  --  4.4  --  3.9   < > 4.2   PROTTOTAL  --  5.0*  --   --   --   --   --  4.9*  --   --   --  4.7*  --  4.3*   ALBUMIN  --  2.6*  --   --   --  2.7*  --  2.6*  --  2.5*  --  2.5*   < > 2.3*   BILITOTAL  --  0.6  --   --   --   --   --  0.8  --   --   --  0.8  --  0.7   ALKPHOS  --  173*  --   --   --   --   --  178*  --   --   --  146  --  122   AST  --  25  --   --   --   --   --  26  --   --   --  22  --  16   ALT  --  36  --   --   --   --   --  38  --   --   --  36  --  32    < > = values in this interval not displayed.     CBC:   Recent Labs   Lab 07/01/24 0346 06/30/24 0332 06/29/24 0359 06/28/24  0427   WBC 7.8 6.3 4.8 4.4   RBC 2.94* 2.95* 2.58* 2.64*   HGB 9.0* 8.9* 7.8* 8.0*   HCT 29.4* 29.1* 25.1* 25.6*    99 97 97   MCH 30.6 30.2 30.2 30.3   MCHC 30.6* 30.6* 31.1* 31.3*   RDW 17.3* 17.0* 16.8* 17.1*   * 126* 111* 98*       INR: No lab results found in last 7 days.    Glucose:   Recent Labs   Lab 07/01/24 0353 07/01/24 0346 06/30/24 2354 06/30/24 2011 06/30/24 1537 06/30/24  1527   * 170* 125* 154* 162* 173*       Blood Gas:   Recent Labs   Lab 06/30/24  1537 06/26/24 0310 06/25/24  1209   O2PER 45 50 50       Culture Data No results for input(s): \"CULT\" in the last 168 hours.    Virology Data:   Lab Results   Component Value Date    FLUAH1 Not Detected 06/18/2024    FLUAH3 Not Detected 06/18/2024    HB6301 Not Detected 06/18/2024    IFLUB Not Detected 06/18/2024    RSVA Not Detected 06/18/2024    RSVB Not Detected 06/18/2024    PIV1 Not Detected 06/18/2024    PIV2 Not Detected 06/18/2024    PIV3 Not Detected 06/18/2024    HMPV Not Detected 06/18/2024    HRVS Negative 01/24/2021    ADVBE Negative 01/24/2021    ADVC Negative 01/24/2021    ADVC Negative 12/23/2020    ADVC Negative 10/07/2019       Historical CMV results (last 3 of prior testing):  Lab Results   Component Value Date    CMVQNT <35 (A) 06/18/2024    CMVQNT Not Detected 03/05/2024    CMVQNT Not " Detected 12/19/2023     Lab Results   Component Value Date    CMVLOG 1.6 06/25/2024    CMVLOG <1.5 06/18/2024    CMVLOG 2.5 02/14/2024       Urine Studies    Recent Labs   Lab Test 06/19/24  1214 01/24/21  1729   URINEPH 6.5 5.0   NITRITE Negative Negative   LEUKEST Large* Moderate*   WBCU >182* 34*       Most Recent [facility]ze Pulmonary Function Testing (FVC/FEV1 only)  FVC-Pre   Date Value Ref Range Status   02/14/2024 0.85 L    12/19/2023 1.04 L    11/21/2023 0.85 L    10/24/2023 0.96 L      FVC-%Pred-Pre   Date Value Ref Range Status   02/14/2024 29 %    12/19/2023 36 %    11/21/2023 29 %    10/24/2023 33 %      FEV1-Pre   Date Value Ref Range Status   02/14/2024 0.80 L    12/19/2023 0.89 L    11/21/2023 0.78 L    10/24/2023 0.87 L      FEV1-%Pred-Pre   Date Value Ref Range Status   02/14/2024 34 %    12/19/2023 38 %    11/21/2023 33 %    10/24/2023 37 %        IMAGING    Recent Results (from the past 48 hour(s))   MR Abdomen MRCP w/o & w Contrast    Narrative    MRCP Without and With Contrast    CLINICAL HISTORY: CBD Dilation to 11mm    DATE: 6/29/2024 1:20 PM    TECHNIQUE:     Images were acquired with and without intravenous gadolinium contrast  through the upper abdomen. The following MR images were acquired  without intravenous contrast: TrueFISP, multiplanar T2-weighted, axial  T1 in/out of phase, T2-weighted MRCP images, axial diffusion-weighted  and axial apparent diffusion coefficient. T1-weighted images were  obtained before contrast at the multiple time points following  contrast injection. 3-D reformatted images were generated by the  technologist. Contrast dose: 5.5mL Gadavist    Comparison study: Ultrasound, 6/28/2024; CT, 10/28/2023    FINDINGS:    Biliary Tree: The common bile duct is dilated measuring up to 11 mm,  similar to the prior ultrasound. No significant intrahepatic biliary  dilation. There is no abrupt caliber change along the course of common  bile duct but rather this tapers gently to  the ampulla, series 4 image  12. No stones visualized in the common bile duct.    Pancreas: The pancreatic duct is not dilated.    Liver: No enhancing mass. No significant fatty infiltration. Liver is  very T2 hypointense with increased signal on out of phase imaging  consistent with iron deposition. Liver is enlarged at 19 cm  craniocaudad.    Gallbladder: Filling defects near the neck of the gallbladder,  corresponding to known radiopaque gallstones seen in prior CT. No  significant pericholecystic fluid. No significant enhancement of the  gallbladder wall.    Spleen: Spleen is T2 hypointense with increase in signal on out of  phase imaging consistent with iron deposition. Spleen is enlarged at  15.4 cm craniocaudad.    Kidneys: Multiple simple renal cyst.    Adrenal glands: Unremarkable.    Bowel: No bowel obstruction.    Lymph nodes: No significant lymphadenopathy.    Blood vessels: No evidence of AAA.    Lung bases: Bilateral pleural effusions with adjacent  atelectasis/airspace disease. Partially visualized collection in the  left pleural space. Please see separate CT chest same date.    Bones and soft tissues: Susceptibility artifact from sternotomy wires  in the lower thorax noted.    Mesentery and abdominal wall: Body wall, retroperitoneal and  mesenteric edema.    Ascites: Small volume ascites.      Impression    IMPRESSION:   1. Borderline dilated common bile duct measuring up to 1.1 cm. No  significant intrahepatic biliary dilation. No evidence of  choledocholithiasis.  2. Cholelithiasis without definitive evidence of acute cholecystitis.  3. Overall third spacing of fluid demonstrated with small volume  ascites, bilateral pleural effusions, mesenteric, retroperitoneal and  body wall edema.  4. Iron deposition in the liver and spleen. Hepatosplenomegaly.    I have personally reviewed the examination and initial interpretation  and I agree with the findings.    NITHYA GUIDO MD         SYSTEM ID:   [id number]   CT Chest w/o Contrast    Narrative    Exam: Chest CT without contrast 6/29/2024 1:41 PM    History: Acute hypoxic respiratory failure.     Comparison: Chest radiograph 6/26/2024. Multiple prior chest CTs most  recent dated 6/18/2024.    Technique: Helical CT imaging of the chest was obtained without the  administration of intravenous contrast.    Findings:    Devices: Right internal jugular central venous catheter tip terminates  in the superior vena cava. Endotracheal tube tip terminates in the  midthoracic trachea. Feeding tube tip is postpyloric and terminates in  the proximal jejunum. Median sternotomy.    Chest:   Mediastinum: The thyroid, pulmonary trunk, thoracic aorta, heart, and  esophagus are normal. No thoracic adenopathy.     Lungs: Status post bilateral lung transplant. Accessory right upper  lobe. Bronchial stent in the right mainstem bronchus and proximal  intermedius. Central trachea is clear. Small left and trace right  pleural effusions. No pneumothorax. Multifocal confluent groundglass  opacities throughout both lungs, with consolidative reticular  opacities in the lung bases. Suggestion of crazy paving in the lung  bases. Rim calcified pleural plaque in the posteromedial aspect of the  left lower lobe. Right hilar calcification.    There is no consolidation, ground glass opacity, pneumothorax, or  pleural effusion.     Upper abdomen: Calcified gallstones. Splenic artery calcifications.  Craniocaudal dimension of the spleen measures 14.7 cm. Trace ascites.    Bones/soft tissues: No acute fracture or dislocation. Chronic  compression type deformity of the T5 vertebral body. Midline  sternotomy.      Impression    Impression:   1. Multifocal panlobar opacities bilaterally; differential includes  multifocal infectious pneumonia (including atypicals), alveolar  pulmonary edema, recurrent interstitial lung disease, medication side  effect, and chronic lung allograft dysfunction (CLAD).    2.  Small left and trace right pleural effusions.    3. Moderate splenomegaly.    4. Cholelithiasis.    5. Trace ascites.    I have personally reviewed the examination and initial interpretation  and I agree with the findings.    NITHYA GUIDO MD         SYSTEM ID:  [id number]

## 2024-07-01 NOTE — PLAN OF CARE
"Time: 1900 - 0730    Major Shift Events: PERRL, GALLOWAY, follows commands, denies pain and numbness/tingling. Fent @ 25, precedex @ 0.5, RASS 0 to -1. Bilateral mitts continued d/t reaching for ETT and ND tube. Pt had brief episode of anxiety and mouthed \"I can't catch my breath\" just after 2200; became hypotensive with MAP of 50, tachycardic to 110's, tachypneic in 50's, but SpO2 was 100% throughout. Mehul titrated accordingly (see flowsheets) and reassurance provided, VSS again once pt's breathing calmed down. CMV settings overnight, small amount of secretions via ETT. Tolerating TF at goal, compazine given x1 for mild nausea with relief. Small amount of loose stool via rectal tube. CRRT goal 0-100/hr, tolerating fluid removal. Net negative 756 mL at midnight. AM labs stable, no lyte replacements needed.    Plan: Continue CRRT. Attempt PST again today after  Ranjan arrives. Encourage activity as tolerated. Follow plan of care, update MD with changes.     For vital signs and complete assessments, please see documentation flowsheets.     Goal Outcome Evaluation:      Plan of Care Reviewed With: patient    Overall Patient Progress: no change  "

## 2024-07-01 NOTE — PROGRESS NOTES
"   07/01/24 1204   Appointment Info   Signing Clinician's Name / Credentials (OT) Isela Kaur, OTR/L   Living Environment   People in Home spouse   Current Living Arrangements house   Home Accessibility stairs within home;other (see comments)  (stair lift)   Transportation Anticipated family or friend will provide   Living Environment Comments Per SW note, pt's  Ranjan stated that \"pt lives with him and their three daughters live close by to provide added support. Ranjan put in chair lifts for pt for the two sets of stairs in the house as well as other DME to ensure pt's safety and independence. Pt is independent with ADLs and IADLs prior to her hospitalization.\"   Self-Care   Usual Activity Tolerance good   Current Activity Tolerance poor   Equipment Currently Used at Home grab bar, toilet;grab bar, tub/shower;shower chair;wheelchair, manual;cane, straight   Fall history within last six months no   Activity/Exercise/Self-Care Comment Patient was IND with all mobility, ADLs/IADLs prior to admission.   Instrumental Activities of Daily Living (IADL)   IADL Comments Patient was IND with all IADLs prior to admission.   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  The patient was admitted on 6/18/24 for progressive " hypoxia with dyspnea and worsening hypercapnia in ED requiring intubation likely d/t post-obstructive PNA 2/2 right bronchus stenosis.  Bronch confirming severe stenosis of right anastomosis with extensive RLL plugging.  IP bronch (6/20) with laser tissue debulking RMB stenosis, airway balloon dilation of RMB and BI, and placement of stent RMB to BI and bifurcating stent in RUL.  S/p volume resuscitation and transition to CRRT 6/20 for hypotension.  Increased agitation/delirium on 6/22 with increasing desaturation led to need for increased sedation.  Recurrence of paroxysmal afib with RVR first noted 6/25.  Remains intubated with pulmonary edema on imaging and septic shock requiring pressor support, poor tolerance of PST.   Existing Precautions/Restrictions fall   Left Upper Extremity (Weight-bearing Status) full weight-bearing (FWB)   Right Upper Extremity (Weight-bearing Status) full weight-bearing (FWB)   Left Lower Extremity (Weight-bearing Status) full weight-bearing (FWB)   Right Lower Extremity (Weight-bearing Status) full weight-bearing (FWB)   General Observations and Info OT consult: ICU Admission, profound weakness   Cognitive Status Examination   Orientation Status person   Cognitive Status Comments Pt presents with lethargy, remains intubated, difficult to formally assess; able to head nod yes/no to Th questions intermittently and track to Th voice when name called   Visual Perception   Visual Impairment/Limitations WFL   Sensory   Sensory Quick Adds sensation intact   Pain Assessment   Patient Currently in Pain Yes, see Vital Sign flowsheet   Posture   Posture forward head position   Posture Comments seated in chair   Range of Motion Comprehensive   General Range of Motion bilateral upper extremity ROM WFL   Comment, General Range of Motion B UE AROM limited by muscular weakness; will monitor   Strength Comprehensive (MMT)   Comment, General Manual Muscle Testing (MMT) Assessment B UE significantly  deconditioned, formal MMT not assessed   Coordination   Coordination Comments Not formally assessed this date due to pt remains intubated and current cognitive status   Bed Mobility   Bed Mobility sit-supine   Sit-Supine [place] (Bed Mobility) moderate assist (50% patient effort);maximum assist (25% patient effort)   Comment (Bed Mobility) per clinical judgement   Transfers   Transfers sit-stand transfer;toilet transfer   Sit-Stand Transfer   Sit-Stand [place] (Transfers) moderate assist (50% patient effort);maximum assist (25% patient effort);2 person assist   Sit/Stand Transfer Comments per clinical judgement   Toilet Transfer   Type (Toilet Transfer) sit-stand   [place] Level (Toilet Transfer) moderate assist (50% patient effort);maximum assist (25% patient effort);2 person assist   Toilet Transfer Comments per clinical judgement   Balance   Balance Assessment sitting static balance   Sitting Balance: Static minimal assist;moderate assist   Balance Comments unsupported sitting in chair   Activities of Daily Living   BADL Assessment/Intervention bathing;upper body dressing;lower body dressing;toileting;grooming   Bathing Assessment/Intervention   [place] Level (Bathing) maximum assist (25% patient effort);set up;dependent (less than 25% patient effort)   Comment, (Bathing) per clinical judgement   Assistive Devices (Bathing) shower chair   Upper Body Dressing Assessment/Training   Comment, (Upper Body Dressing) per clinical judgement   [place] Level (Upper Body Dressing) moderate assist (50% patient effort);set up   Lower Body Dressing Assessment/Training   Comment, (Lower Body Dressing) per clinical judgement   [place] Level (Lower Body Dressing) maximum assist (25% patient effort);dependent (less than 25% patient effort)   Grooming Assessment/Training   [place] Level (Grooming) moderate assist (50% patient effort)   Comment, (Grooming) per clinical judgement   Toileting    Comment, (Toileting) per clinical judgement   [place] Level (Toileting) dependent (less than 25% patient effort)   Clinical Impression   Criteria for Skilled Therapeutic Interventions Met (OT) Yes, treatment indicated   OT Diagnosis Decreased ADL IND/safety, functional mobility, activity tolerance, cognition   OT Problem List-Impairments impacting ADL problems related to;activity tolerance impaired;balance;cognition;coordination;mobility;range of motion (ROM);strength;pain;postural control   Assessment of Occupational Performance 5 or more Performance Deficits   Identified Performance Deficits dressing, bathing, toileting functional mobility, cognition, muscular strength/endurance, standing g/h tasks, IADL tasks   Planned Therapy Interventions (OT) ADL retraining;IADL retraining;balance training;bed mobility training;cognition;ROM;strengthening;transfer training;progressive activity/exercise   Clinical Decision Making Complexity (OT) problem focused assessment/low complexity   Risk & Benefits of therapy have been explained evaluation/treatment results reviewed;care plan/treatment goals reviewed;risks/benefits reviewed;participants included;patient;spouse/significant other   Clinical Impression Comments Pt below baseline function and will benefit from continued skilled OT during IP stay to progress IND/safety and return to PLOF   OT Total Evaluation Time   OT Eval, Low Complexity Minutes (71961) 5   OT Goals   Therapy Frequency (OT) 5 times/week   OT Predicted Duration/Target Date for Goal Attainment 07/30/24   OT Goals Hygiene/Grooming;Lower Body Dressing;Upper Body Bathing;Lower Body Bathing;Toilet Transfer/Toileting;Cognition;OT Goal 1   OT: Hygiene/Grooming modified independent;while standing   OT: Lower Body Dressing Modified independent;including set-up/clothing retrieval   OT: Upper Body Bathing Modified independent   OT: Lower Body Bathing Modified independent   OT: Toilet Transfer/Toileting Modified  independent;toilet transfer;cleaning and garment management   OT: Cognitive Patient/caregiver will verbalize understanding of cognitive assessment results/recommendations as needed for safe discharge planning   OT: Goal 1 Pt will demonstrate B UE HEP seated in chair IND prior to dc home   OT Discharge Planning   OT Plan OT: monitor cognition, unsupported/EOB core balance, seated ADLs, STS as able   OT Discharge Recommendation (DC Rec) Transitional Care Facility   OT Rationale for DC Rec Patient well below IND baseline, will benefit from continued rehab at TCU to maximize functional status. Currently, requiring OH Lift for functional transfers this date.   OT Brief overview of current status OH Lift   Total Session Time   Total Session Time (sum of timed and untimed services) 5

## 2024-07-01 NOTE — PROGRESS NOTES
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 Nephrology consulted for management of HD while admitted.      Interval History :   Mrs Blue continues on CRRT, slightly net negative yesterday, similar goal today 0-50cc/h, labs stable on 4k baths and we are checking BID to avoid excessive blood draws.  Working on extubation vs trach with likely decision in next day or two, continuing CRRT as she is still on moderate dose of merlene.  Is now at/slightly below EDW so will be mindful of this with fluid removal.      Assessment & Recommendations:   ESRD-ESKD: pt dialyzes MWF at [facility] (ph [phone], f [phone]) with Dr. Glasgow. Run time: 3.5 hrs. EDW 52.5 kg. Access: L AVF. +heparin 1,500u bolus.                  -Appreciate team placing tri-alysis line to run norepi and will plan for CRRT, 4k baths, planning 0-50cc/h net negative, 500u heparin/h through circuit.                -No need for new dialysis consent, continuing long term HD for ESRD.                -L AVF with remote hx of needing intervention, no issue recently.      Outpt Rx:       Volume-At EDW but still on vent and may have lost some muscle mass loss with acute illness.      Pulm-Admitted with hypercapnic resp failure, suspected PNA. ID involved, getting bronch.  Currently on Zosyn, bactrim, Posaconazole, Azithromycin and Vanco x1 dose so far.       Electrolytes-Stable on CRRT, 4k baths, checking labs BID.      BMD-Stable on CRRT, has replacements ordered with CRRT order set.      Anemia-Hgb ~9 and stable/improving, last PRBC's 6/26 on venofer 50mg weekly but will hold  "while treating for infection. On Mircera 60mcg [id number], will cover with short term Epo while admitted when stable enough for HD.       Nutrition-Novasource renal      Time spent: 50 minutes on this date of encounter for chart review, physical exam, medical decision making and co-ordination of care.      Seen and discussed with Dr Barbosa     Recommendations were communicated to primary team via verbal communication.     ADRIÁN Lo CNS  Clinical Nurse Specialist  [phone]    Review of Systems:   I reviewed the following systems:  ROS not done due to vent/sedation    Physical Exam:   I/O last 3 completed shifts:  In: 2461.83 [I.V.:936.83; NG/GT:805]  Out: 3399.5 [Other:3249.5; Stool:150]   BP (!) 140/56   Pulse 77   Temp 97.1  F (36.2  C) (Axillary)   Resp (!) 36   Ht 1.6 m (5' 3\")   Wt 51.6 kg (113 lb 12.1 oz)   LMP 06/07/2014 (Exact Date)   SpO2 96%   BMI 20.15 kg/m       GENERAL APPEARANCE: Intubated but alert.    EYES: No scleral icterus  Pulmonary: lungs Rhonchi to auscultation with equal breath sounds bilaterally  CV: Regular rhythm, normal rate   - Edema none  GI: soft, nontender, normal bowel sounds  MS: no evidence of inflammation in joints, no muscle tenderness  : No Basilio  SKIN: no rash, warm, dry  NEURO: No focal deficits    Labs:   All labs reviewed by me  Electrolytes/Renal -   Recent Labs   Lab Test 07/01/24  0955 07/01/24  0353 07/01/24  0346 06/30/24  1537 06/30/24  1527 06/30/24  0338 06/30/24  0332   NA  --   --  138  --  137  --  137   POTASSIUM  --   --  3.6  --  3.8  --  3.9   CHLORIDE  --   --  107  --  106  --  107   CO2  --   --  22  --  21*  --  21*   BUN  --   --  28.1*  --  28.1*  --  29.0*   CR  --   --  0.89  --  0.88  --  0.96*   * 155* 170*   < > 173*   < > 192*   CHELSEY  --   --  7.8*  --  7.9*  --  7.4*   MAG  --   --  2.5*  --  2.5*  --  2.4*   PHOS  --   --  4.7*  --  4.6*  --  4.8*    < > = values in this interval not displayed.       CBC -   Recent Labs "   Lab Test 07/01/24  0346 06/30/24  0332 06/29/24  0359   WBC 7.8 6.3 4.8   HGB 9.0* 8.9* 7.8*   * 126* 111*       LFTs -   Recent Labs   Lab Test 07/01/24  0346 06/30/24  1527 06/30/24  0332 06/29/24  1604 06/29/24  0359   ALKPHOS 173*  --  178*  --  146   BILITOTAL 0.6  --  0.8  --  0.8   ALT 36  --  38  --  36   AST 25  --  26  --  22   PROTTOTAL 5.0*  --  4.9*  --  4.7*   ALBUMIN 2.6* 2.7* 2.6*   < > 2.5*    < > = values in this interval not displayed.       Iron Panel -   Recent Labs   Lab Test 02/03/21  0415 12/13/18  1033 08/01/18  0921   IRON 51 16* 93   IRONSAT 36 7* 37   YOLA  --  302* 571*           Current Medications:  Current Facility-Administered Medications   Medication Dose Route Frequency Provider Last Rate Last Admin    acetaminophen (TYLENOL) tablet 325 mg  325 mg Oral or Feeding Tube Q4H Jailyn Lou MD        acetylcysteine (MUCOMYST) 10 % nebulizer solution 4 mL  4 mL Inhalation BID Velez Reyes, German, MD   4 mL at 07/01/24 0833    azithromycin (ZITHROMAX) tablet 250 mg  250 mg Oral or Feeding Tube Daily Velez Reyes, German, MD   250 mg at 07/01/24 0846    B and C vitamin Complex with folic acid (NEPHRONEX) liquid 5 mL  5 mL Per Feeding Tube Daily Awa Watt MD   5 mL at 07/01/24 0844    calcium carbonate (TUMS) chewable tablet 500 mg  500 mg Oral Once per day on Sunday Tuesday Thursday Saturday Josesito Pratt MD   500 mg at 06/30/24 0818    chlorhexidine (PERIDEX) 0.12 % solution 15 mL  15 mL Mouth/Throat Q12H Chio Cortez MD   15 mL at 07/01/24 0846    epoetin alisha-epbx (RETACRIT) injection 4,000 Units  4,000 Units Intravenous Once per day on Monday Wednesday Friday Velez Reyes, German, MD   4,000 Units at 07/01/24 0845    hydrocortisone sodium succinate PF (solu-CORTEF) injection 25 mg  25 mg Intravenous BID Velez Reyes, German, MD   25 mg at 07/01/24 0845    insulin aspart (NovoLOG) injection (RAPID ACTING)  1-12 Units Subcutaneous Q4H Bell,  Edin ARROYO MD   1 Units at 07/01/24 0411    insulin NPH injection 8 Units  8 Units Subcutaneous BID Velez Reyes, German, MD   8 Units at 07/01/24 0847    levalbuterol (XOPENEX) neb solution 0.63 mg  0.63 mg Nebulization 4x Daily Gareth Mosquera MD   0.63 mg at 07/01/24 1122    linezolid (ZYVOX) tablet 600 mg  600 mg Oral or Feeding Tube Q12H Velez Reyes, German, MD   600 mg at 06/30/24 2357    [Held by provider] magnesium chloride CR tablet 1,070 mg  1,070 mg Oral Once per day on Sunday Tuesday Thursday Saturday Josesito Pratt MD        metoprolol tartrate (LOPRESSOR) tablet 25 mg  25 mg Oral BID Velez Reyes, German, MD   25 mg at 07/01/24 0847    micafungin (MYCAMINE) 150 mg in sodium chloride 0.9 % 100 mL intermittent infusion  150 mg Intravenous Q24H Chio Cortez  mL/hr at 07/01/24 0201 150 mg at 07/01/24 0201    midodrine (PROAMATINE) tablet 15 mg  15 mg Oral TID w/meals Kajal Chong APRN CNP   15 mg at 07/01/24 0845    minocycline (MINOCIN) 100 mg in sodium chloride 0.9 % 100 mL intermittent infusion  100 mg Intravenous Q12H Kajal Chogn APRN  mL/hr at 07/01/24 0100 100 mg at 07/01/24 0100    montelukast (SINGULAIR) tablet 10 mg  10 mg Oral At Bedtime Velez Reyes, German, MD   10 mg at 06/30/24 2125    pantoprazole (PROTONIX) 2 mg/mL suspension 40 mg  40 mg Per Feeding Tube Daily Josesito Pratt MD   40 mg at 06/30/24 1211    posaconazole (NOXAFIL) DR tablet TBEC 300 mg  300 mg Per Feeding Tube Daily Josesito Pratt MD   300 mg at 06/30/24 1210    [Held by provider] predniSONE (DELTASONE) tablet 5 mg  5 mg Oral Daily Velez Reyes, German, MD   5 mg at 06/21/24 0822    protein modular (PROSOURCE TF20) packet 1 packet  1 packet Per Feeding Tube BID Awa Watt MD   1 packet at 07/01/24 0847    QUEtiapine (SEROquel) tablet 50 mg  50 mg Oral or Feeding Tube At Bedtime Kajal Chong APRN CNP   50 mg at 06/30/24 2125    ramelteon (ROZEREM) tablet 8 mg  8 mg Oral At  Bedtime Velez Reyes, German, MD   8 mg at 06/30/24 2125    sodium chloride (NEBUSAL) 3 % neb solution 3 mL  3 mL Nebulization BID Velez Reyes, German, MD   3 mL at 07/01/24 1122    sodium chloride (PF) 0.9% PF flush 10 mL  10 mL Intracatheter Q8H Kajal Chong, APRN CNP   10 mL at 07/01/24 0956    sodium chloride (PF) 0.9% PF flush 10 mL  10 mL Intracatheter Q8H Kajal Chong, APRN CNP   10 mL at 06/30/24 1614    sulfamethoxazole-trimethoprim (BACTRIM) 400-80 MG per tablet 1 tablet  1 tablet Oral or Feeding Tube Daily Josesito Pratt MD   1 tablet at 06/30/24 2003    tacrolimus (GENERIC) suspension 0.8 mg  0.8 mg Oral or Feeding Tube BID IS Yonny Orona MD   0.8 mg at 07/01/24 0848    valGANciclovir (VALCYTE) solution 450 mg  450 mg Per Feeding Tube Daily Rufina Huff, CNP   450 mg at 07/01/24 0843    Vitamin D3 (CHOLECALCIFEROL) tablet 50 mcg  50 mcg Oral Once per day on Monday Wednesday Friday Velez Reyes, German, MD   50 mcg at 06/28/24 0851     Current Facility-Administered Medications   Medication Dose Route Frequency Provider Last Rate Last Admin    dexmedeTOMIDine (PRECEDEX) 4 mcg/mL in sodium chloride 0.9 % 100 mL infusion  0.1-1.2 mcg/kg/hr (Dosing Weight) Intravenous Continuous Chio Cortez MD 3.8 mL/hr at 07/01/24 1100 0.3 mcg/kg/hr at 07/01/24 1100    dextrose 10% infusion   Intravenous Continuous PRN Velez Reyes, German, MD        dextrose 10% infusion   Intravenous Continuous PRN Awa Watt MD        dialysate for CVVHD & CVVHDF (Phoxillum BK4/2.5)  12.5 mL/kg/hr CRRT Continuous Jonna Rodas  mL/hr at 07/01/24 1026 12.5 mL/kg/hr at 07/01/24 1026    heparin (porcine) 20,000 units in 20 mL ANTICOAGULANT infusion (syringe from pharmacy)  500 Units/hr CRRT Continuous Hardy Tran APRN CNS 0.5 mL/hr at 07/01/24 1100 500 Units/hr at 07/01/24 1100    phenylephrine (DANDY-SYNEPHRINE) 50 mg in NaCl 0.9 % 250 mL infusion  0.1-6 mcg/kg/min (Dosing Weight) Intravenous  Continuous Chio Cortez MD 3.8 mL/hr at 07/01/24 1030 0.25 mcg/kg/min at 07/01/24 1030    POST-filter replacement solution for CVVHD & CVVHDF (Phoxillum BK4/2.5)   CRRT Continuous Jonna Rodas  mL/hr at 06/30/24 2321 New Bag at 06/30/24 2321    PRE-filter replacement solution for CVVHD & CVVHDF (Phoxillum BK4/2.5)  12.5 mL/kg/hr CRRT Continuous Jonna Rodas  mL/hr at 07/01/24 1025 12.5 mL/kg/hr at 07/01/24 1025            1.91

## 2024-07-02 NOTE — ANESTHESIA PREPROCEDURE EVALUATION
Anesthesia Pre-Procedure Evaluation    Patient: Kecia Blue   MRN: [id number] : 1960        Procedure : Procedure(s):  Tracheostomy          Past Medical History:   Diagnosis Date    Acute on chronic respiratory failure with hypoxia (H) 2018    Anisocoria     Antisynthetase syndrome (H24) 2014    Anxiety     Aspergillus (H)     Aspergillus pneumonia (H) 2020    Benign essential hypertension     C. difficile colitis     Cytomegalovirus (CMV) viremia (H)     Dermatomyositis (H)     Dysplasia of cervix, low grade (ESTRADA 1)     EBV (Franklin-Barr virus) viremia     ESRD (end stage renal disease) on dialysis (H)     ILD (interstitial lung disease) (H)     Lung replaced by transplant (H)     Osteopenia     Paroxysmal atrial fibrillation (H)     Pneumocystis jiroveci pneumonia (H)     PONV (postoperative nausea and vomiting)     Post-menopause     Pulmonary hypertension (H)     Raynaud's disease     Seronegative rheumatoid arthritis (H)       Past Surgical History:   Procedure Laterality Date    BRONCHOSCOPY (RIGID OR FLEXIBLE), DIAGNOSTIC N/A 04/10/2018    Procedure: COMBINED BRONCHOSCOPY (RIGID OR FLEXIBLE), LAVAGE;;  Surgeon: Mariposa Donohue MD;  Location: [facility]    BRONCHOSCOPY (RIGID OR FLEXIBLE), DIAGNOSTIC N/A 2020    Procedure: BRONCHOSCOPY, WITH BRONCHOALVEOLAR LAVAGE;  Surgeon: Uri Bass MD;  Location: [facility]    BRONCHOSCOPY (RIGID OR FLEXIBLE), DIAGNOSTIC N/A 2022    Procedure: BRONCHOSCOPY, WITH BRONCHOALVEOLAR LAVAGE;  Surgeon: Uri Bass MD;  [facility]: [facility]    BRONCHOSCOPY (RIGID OR FLEXIBLE), DIAGNOSTIC N/A 2023    Procedure: BRONCHOSCOPY, WITH BRONCHOALVEOLAR LAVAGE;  Surgeon: Esau Bruno MD;  [facility]: [facility]    BRONCHOSCOPY (RIGID OR FLEXIBLE), DIAGNOSTIC N/A 2023    Procedure: BRONCHOSCOPY, WITH BRONCHOALVEOLAR LAVAGE;  Surgeon: Beto Magaña DO;  [facility]: [facility]    BRONCHOSCOPY (RIGID OR FLEXIBLE), DILATE BRONCHUS /  TRACHEA N/A 10/11/2018    Procedure: BRONCHOSCOPY (RIGID OR FLEXIBLE), DILATE BRONCHUS / TRACHEA;  Flexible And Rigid Bronchoscopy and Dilation;  Surgeon: Wilber Lin MD;  [facility]    BRONCHOSCOPY (RIGID OR FLEXIBLE), DILATE BRONCHUS / TRACHEA N/A 11/01/2023    Procedure: Bronchoscopy (Rigid Or Flexible), Dilate Bronchus / Trachea;  Surgeon: Beto Magaña DO;  [facility]: [facility]    BRONCHOSCOPY FLEXIBLE N/A 03/13/2018    Procedure: BRONCHOSCOPY FLEXIBLE;  Flexible Bronchoscopy ;  Surgeon: Gissell Sanchez MD;  [facility]: [facility]    BRONCHOSCOPY FLEXIBLE N/A 05/09/2018    Procedure: BRONCHOSCOPY FLEXIBLE;;  Surgeon: Wilber Lin MD;  [facility]: [facility]    BRONCHOSCOPY FLEXIBLE AND RIGID N/A 09/10/2018    Procedure: BRONCHOSCOPY FLEXIBLE AND RIGID;  Flexible and Rigid Bronchoscopy with Balloon Dilation, tissue debulking with cryo, and Right mainstem bronchus stent placement;  Surgeon: Wilber Lin MD;  [facility]    BRONCHOSCOPY RIGID N/A 06/06/2018    Procedure: BRONCHOSCOPY RIGID;;  Surgeon: Lopez Macias MD;  [facility]    BRONCHOSCOPY, DILATE BRONCHUS, STENT BRONCHUS, COMBINED N/A 06/11/2018    Procedure: COMBINED BRONCHOSCOPY, DILATE BRONCHUS, STENT BRONCHUS;  Flexible Bronchoscopy, Balloon Dilation, Bronchial Washings;  Surgeon: Wilber Lin MD;  [facility]    BRONCHOSCOPY, DILATE BRONCHUS, STENT BRONCHUS, COMBINED Right 07/10/2018    Procedure: COMBINED BRONCHOSCOPY, DILATE BRONCHUS, STENT BRONCHUS;  Flexible Bronchoscopy, Balloon Dilation, Bronchial Washings  ;  Surgeon: Wilber Lin MD;  [facility]    BRONCHOSCOPY, DILATE BRONCHUS, STENT BRONCHUS, COMBINED N/A 08/02/2018    Procedure: COMBINED BRONCHOSCOPY, DILATE BRONCHUS, STENT BRONCHUS;  Flexible Bronchoscopy, Bronchial Washings, Balloon Dilation;  Surgeon: Wilber Lin MD;  [facility]    BRONCHOSCOPY, DILATE BRONCHUS, STENT BRONCHUS, COMBINED N/A  08/20/2018    Procedure: COMBINED BRONCHOSCOPY, DILATE BRONCHUS, STENT BRONCHUS;  Flexible Bronchoscopy, Balloon Dilation;  Surgeon: Wilber Lin MD;  [facility]    BRONCHOSCOPY, DILATE BRONCHUS, STENT BRONCHUS, COMBINED N/A 10/29/2018    Procedure: Flexible Bronchoscopy, Balloon Dilation, Stent Revision, Airway Examination And Therapeutic Suctioning, Cyro Tumor Debulking;  Surgeon: Wilber Lin MD;  [facility]    BRONCHOSCOPY, DILATE BRONCHUS, STENT BRONCHUS, COMBINED N/A 11/20/2018    Procedure: Rigid Bronchoscopy, Stent Removal and dilitation;  Surgeon: Wilber Lin MD;  [facility]    BRONCHOSCOPY, DILATE BRONCHUS, STENT BRONCHUS, COMBINED N/A 12/14/2018    Procedure: Flexible And Rigid Bronchoscopy, Balloon Dilation, Bronchial Washing;  Surgeon: Wilber Lin MD;  [facility]    BRONCHOSCOPY, DILATE BRONCHUS, STENT BRONCHUS, COMBINED N/A 01/17/2019    Procedure: Flexible And Rigid Bronchoscopy, Balloon Dilation.;  Surgeon: Wilber Lin MD;  [facility]    BRONCHOSCOPY, DILATE BRONCHUS, STENT BRONCHUS, COMBINED N/A 03/07/2019    Procedure: Flexible and Rigid Bronchoscopy, Bronchial Washing, Balloon Dilation;  Surgeon: Wilber Lin MD;  [facility]    BRONCHOSCOPY, DILATE BRONCHUS, STENT BRONCHUS, COMBINED N/A 06/06/2019    Procedure: Rigid and Flexible Bronchoscopy, Balloon Dilation;  Surgeon: Wilber Lin MD;  [facility]    BRONCHOSCOPY, DILATE BRONCHUS, STENT BRONCHUS, COMBINED N/A 10/11/2019    Procedure: Flexible and Rigid Bronchoscopy, Balloon Dilation, Bronchoalveolar Lagave;  Surgeon: Wilber Lin MD;  [facility]    BRONCHOSCOPY, DILATE BRONCHUS, STENT BRONCHUS, COMBINED N/A 02/19/2021    Procedure: BRONCHOSCOPY, flexible, airway dilation, and bronchial wash;  Surgeon: Wilber Lin MD;  [facility]    BRONCHOSCOPY, DILATE BRONCHUS, STENT BRONCHUS, COMBINED N/A 04/09/2021     Procedure: BRONCHOSCOPY, flexible and rigid, Airway suctioning;  Surgeon: Mati Norris MD;  [facility]    BRONCHOSCOPY, DILATE BRONCHUS, STENT BRONCHUS, COMBINED N/A 10/17/2023    Procedure: RIGID, flexible bronchoscopy with airway dilation;  Surgeon: Preet Patel MD;  [facility]    BRONCHOSCOPY, DILATE BRONCHUS, STENT BRONCHUS, COMBINED N/A 6/20/2024    Procedure: RIGID AND FLEXIBLE BRONCHOSCOPY, BALLOON DILATION, TISSUE DEBULKING WITH C02 LASER, STENT PLACEMENT;  Surgeon: Juana Baugh MD;  [facility]    CV RIGHT HEART CATH MEASUREMENTS RECORDED N/A 03/10/2020    Procedure: CV RIGHT HEART CATH;  Surgeon: Wai Garcia MD;  [facility]    ENT SURGERY      tonsillectomy as a child    ESOPHAGOSCOPY, GASTROSCOPY, DUODENOSCOPY (EGD), COMBINED N/A 10/29/2018    Procedure: COMBINED ESOPHAGOSCOPY, GASTROSCOPY, DUODENOSCOPY (EGD) with biopsies and polypectomy;  Surgeon: Chente Bloom MD;  [facility]    INSERT EXTRACORPORAL MEMBRANE OXYGENATOR ADULT (OUTSIDE OR) N/A 02/27/2018    Procedure: INSERT EXTRACORPORAL MEMBRANE OXYGENATOR ADULT (OUTSIDE OR);  INSERT EXTRACORPORAL MEMBRANE OXYGENATOR ADULT (OUTSIDE OR) ;  Surgeon: Toby Hernandez MD;  [facility]    IR CVC TUNNEL PLACEMENT > 5 YRS OF AGE  10/25/2019    IR DIALYSIS FISTULOGRAM LEFT  03/02/2021    IR DIALYSIS FISTULOGRAM LEFT  11/02/2023    IR DIALYSIS FISTULOGRAM LEFT  03/05/2024    IR DIALYSIS MECH THROMB, PTA  03/02/2021    IR DIALYSIS PTA  11/02/2023    IR FLUORO 0-1 HOUR  05/07/2021    IR GASTRO JEJUNOSTOMY TUBE PLACEMENT  02/16/2021    IR PARACENTESIS  01/08/2020    IR THORACENTESIS  09/13/2019    IR TRANSCATHETER BIOPSY  01/08/2020    LASER CO2 BRONCHOSCOPY N/A 04/30/2021    Procedure: Flexible and Rigid Bronchoscopy and Tissue Debulking with CO2 Laser Assistance;  Surgeon: Mati Norris MD;  [facility]    LASER CO2 BRONCHOSCOPY N/A 06/11/2021    Procedure:  BRONCHOSCOPY, Flexible and Rigid Bronchoscopy, Tissue Debulking with cryo Assistance, airway dilation,;  Surgeon: Mati Norris MD;  [facility]    LASER CO2 BRONCHOSCOPY N/A 09/16/2021    Procedure: BRONCHOSCOPY, flexible and rigid, CO2 Laser Debulking;  Surgeon: Mati Norris MD;  [facility]    LASER CO2 BRONCHOSCOPY N/A 02/11/2022    Procedure: flexible, rigid bronchoscopy, tissue debulking, airway dilation, co2 laser, bronchoalveolar lavage;  Surgeon: Juana Baugh MD;  [facility]    LASER CO2 BRONCHOSCOPY Right 11/17/2023    Procedure: flexible bronchosocopy, tissue/tumor debulking, using CO2 laser, mitomycin application and argon plasma coagulation;  Surgeon: Mati Norris MD;  [facility]    LASER CO2 BRONCHOSCOPY N/A 02/15/2024    Procedure: Flexible, Rigid Bronchoscopy,Tumor/Tissue debulking using Carbon Dioxide (CO2) laser; balloon dilation;  Surgeon: Wilber Lin MD;  [facility]    MIDLINE SINGLE LUMEN PLACEMENT Right 06/19/2024    3FR SL midline.    no prior surgery      REMOVE EXTRACORPORAL MEMBRANE OXYGENATOR ADULT N/A 03/03/2018    Procedure: REMOVE EXTRACORPORAL MEMBRANE OXYGENATOR ADULT;  Removal of Right Femoral Venous and Right Axillary Arterial Extracorporeal Membrane Oxygenator;  Surgeon: Toby Hernandez MD;  [facility]    TRANSPLANT LUNG RECIPIENT SINGLE X2 Bilateral 03/01/2018    Procedure: TRANSPLANT LUNG RECIPIENT SINGLE X2;  Median Sternotomy, Extracorporeal Membrane Oxygenator, bilateral sequential lung transplant;  Surgeon: Toby Hernandez MD;  [facility]      Allergies   Allergen Reactions    Isopropyl Alcohol Other (See Comments)     The alcohol burns the open areas on her skin when used as a skin prep for procedures    Vancomycin Other (See Comments)     Diffuse erythroderma with itching (improved with Benadryl) after receiving vancomycin over 1 hour. 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 Can use vancomycin in the future, but please give over slower infusion time (at least 2 hours) and premedicate with Benadryl.      Social History     Tobacco Use    Smoking status: Never    Smokeless tobacco: Never   Substance Use Topics    Alcohol use: No     Alcohol/week: 1.0 standard drink of alcohol     Types: 1 Glasses of wine per week      Wt Readings from Last 1 Encounters:   07/02/24 50 kg (110 lb 3.7 oz)        Anesthesia Evaluation   Pt has had prior anesthetic.     History of anesthetic complications  - PONV.      ROS/MED HX  ENT/Pulmonary: Comment: Acute on chronic respiratory failure.  Anisocoria  Interstitial lung disease s/p lung transplant (2018) c/b CLAD, right bronchial stenosis s/p serial dilations  Admitted 6/18/24 for progressive hypoxia and hypercapnia, intubated, found severe stenosis of right anastomosis with extensive RLL plugging s/p tissue debulking RMB stenosis, airway balloon dilation of RMB and BI, and placement of stent RMB to BI and bifurcating stent in RUL. Remains intubated with pulmonary edema on imaging and septic shock requiring pressor support, limited tolerance of PST.        Neurologic:       Cardiovascular: Comment: Recurrence of paroxysmal afib with RVR first noted 6/25    (+)  hypertension- -   -  - -                        dysrhythmias (Paroxysmal A-fib), a-fib,        Previous cardiac testing   Echo: Date: 6/18/24 Results:  Global and regional left ventricular function is normal with an EF of 55-60%.  Right ventricular function, chamber size, wall motion, and thickness are  normal.  The inferior vena cava cannot be assessed.  No pericardial effusion is present.  No significant valvular abnormalities present.    Stress Test:  Date: 8/22/23 Results:  Baseline electrocardiogram demonstrates sinus rhythm.LVH with strain. The stress electrocardiogram was non-diagnostic due to baseline ST changes.  Arrhythmias during  stress: Occasional PVCs.  Arrhythmias during recovery: Occasional PVCs.  ECG Reviewed:  Date: 6/18/24 Results:  Sinus rhythm with Premature atrial complexes   Anteroseptal infarct (cited on or before 30-JUN-2024)   T wave abnormality, consider lateral ischemia   Abnormal ECG   When compared with ECG of 30-JUN-2024 03:33, (unconfirmed)   Questionable change in initial forces of Anterior leads     Cath:  Date: 3/10/20 Results:    Right sided filling pressures are mildly elevated.    Mild elevated Pulmonary Hypertension.    Left sided filling pressures are mildly elevated.    Normal cardiac output level.    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, EBV viremia s/p rituximab, recurrent CMV viremia, h/o Nocardia infection, h/o PJP PNA (2021),      Malignancy:       Other:            Physical Exam    Airway        Mallampati: II   TM distance: > 3 FB   Neck ROM: full   Mouth opening: > 3 cm    Respiratory Devices and Support         Dental       (+) Completely normal teeth      Cardiovascular          Rhythm and rate: regular and normal     Pulmonary           breath sounds clear to auscultation           OUTSIDE LABS:  CBC:   Lab Results   Component Value Date    WBC 6.8 07/02/2024    WBC 7.8 07/01/2024    HGB 9.0 (L) 07/02/2024    HGB 9.0 (L) 07/01/2024    HCT 28.3 (L) 07/02/2024    HCT 29.4 (L) 07/01/2024     (L) 07/02/2024     (L) 07/01/2024      BMP:   Lab Results   Component Value Date     07/02/2024     07/01/2024    POTASSIUM 3.6 07/02/2024    POTASSIUM 3.8 07/01/2024    CHLORIDE 108 (H) 07/02/2024    CHLORIDE 105 07/01/2024    CO2 22 07/02/2024    CO2 23 07/01/2024    BUN 28.2 (H) 07/02/2024    BUN 29.2 (H) 07/01/2024    CR 0.91 07/02/2024    CR 0.95 07/01/2024     (H) 07/02/2024     (H) 07/02/2024     COAGS:   Lab Results   Component Value Date    PTT 28 02/12/2021    INR 0.98 06/18/2024    FIBR 358 02/11/2021     POC:   Lab Results   Component Value Date    BGM 75 06/11/2021    HCG Negative 06/06/2019     HEPATIC:   Lab Results   Component Value Date    ALBUMIN 2.8 (L) 07/02/2024    PROTTOTAL 5.4 (L) 07/02/2024    ALT 37 07/02/2024    AST 26 07/02/2024     (H) 06/18/2024    ALKPHOS 176 (H) 07/02/2024    BILITOTAL 0.6 07/02/2024    SALAS <10 (L) 01/24/2021     OTHER:   Lab Results   Component Value Date    PH 7.38 06/30/2024    LACT 0.9 06/24/2024    A1C 5.1 06/25/2024    CHELSEY 8.1 (L) 07/02/2024    PHOS 4.8 (H) 07/02/2024    MAG 2.5 (H) 07/02/2024    LIPASE 39 06/27/2024    AMYLASE 58 02/19/2018    TSH 1.63 06/26/2024    CRP 25.0 (H) 10/06/2019    SED 10 06/18/2024       Anesthesia Plan    ASA Status:  4       Anesthesia Type: General.     - Airway: ETT   Induction: Inhalation.   Maintenance: Balanced.   Techniques and Equipment:     - Lines/Monitors: CVL in situ, Arterial Line     Consents    Anesthesia Plan(s) and associated risks, benefits, and realistic alternatives discussed. Questions answered and patient/representative(s) expressed understanding.     - Discussed:     - Discussed with:  Parent (Mother and/or Father)      - Extended Intubation/Ventilatory Support Discussed: No.      - Patient is DNR/DNI Status: No     Use of blood products discussed: No .     Postoperative Care    Pain management: IV analgesics, Multi-modal analgesia.   PONV prophylaxis: Dexamethasone or Solumedrol     Comments:                Shree Camarena MD    I have reviewed the pertinent notes and labs in the chart from the past 30 days and (re)examined the patient.  Any updates or changes from those notes are reflected in this note.

## 2024-07-02 NOTE — CONSULTS
"Interventional Pulmonary Note    Please see prior consult note during this admission for more details. Interventional pulmonary reconsulted for tracheostomy placement.     Subjective  Patient remains intubated almost two weeks after intubation for acute hypoxic respiratory failure. Per discussion with  at bedside, she's been feeling short of breath with pressure support trials despite maintaining adequate oxygenation. She has been sleepy throughout the day.     Objective  BP (!) 140/56   Pulse 94   Temp 98.4  F (36.9  C) (Axillary)   Resp (!) 38   Ht 1.6 m (5' 3\")   Wt 51.6 kg (113 lb 12.1 oz)   LMP 06/07/2014 (Exact Date)   SpO2 100%   BMI 20.15 kg/m   on FiO2 45%  General: Sitting up in chair  Lungs: Breathing comfortably with vent  Heart: Regular rate and rhythm  Neuro: Awakens easily to voice, but falls back asleep when not directly spoken to.    Labs and imaging personally reviewed    Assessment and Recommendations  Acute hypoxic respiratory failure  Right mainstem stenosis s/p stenting  Patient has previous history of tracheostomy in 2021 and subsequently decannulated. Given possibility of scar tissue from prior trach, bedside percutaneous tracheostomy is higher risk. Recommend ENT consultation for trach placement. Will plan on bedside bronchoscopy tomorrow afternoon to evaluate stents prior to trach placement     Juana Baugh MD  Interventional Pulmonary   "

## 2024-07-02 NOTE — PLAN OF CARE
ICU End of Shift Summary. See flowsheets for vital signs and detailed assessment.    Changes this shift: CRRT off since 1030 this morning. On and off small dose phenylephrine, MICU team aware. Continuing with midodrine and small dose phenyl. PST x2 today 12/5 FiO2 40-50%, mild tachypnea with rates in low 30s. First trial was ~1hr and switched back to full support in order to do bronch procedure. Repeat CXR done. TF restarted after bronch. Patient appears to be oriented, answering yes/no questions appropriately. Denies visual hallucinations today. Precedex stopped at 1000. Lethargic throughout day. Reported did not sleep overnight, trazodone added to night time medication regimen. Delirium precautions continued. Complained of pain/discomfort in rectum from rectal tube, therefore discontinued. Up to chair with lift and stood a couple times from chair with PT/OT.     Plan: Trach in OR tomorrow at 1330. PEG/J placement pending IR availability. NPO at midnight. Delirium precautions.         Goal Outcome Evaluation:      Plan of Care Reviewed With: patient    Overall Patient Progress: improvingOverall Patient Progress: improving    Outcome Evaluation: Trialing off CRRT today, plan for trach tomorrow and PEG/J.

## 2024-07-02 NOTE — PROGRESS NOTES
CLINICAL NUTRITION SERVICES - REASSESSMENT NOTE     Nutrition Prescription    RECOMMENDATIONS FOR MDs/PROVIDERS TO ORDER:  None currently     Malnutrition Status:    Moderate malnutrition in the context of acute illness/disease    Recommendations already ordered by Registered Dietitian (RD):  Continue nutrition support as ordered:  Goal TF: Novasource Renal (or equivalent) @ 30 ml/hr (720 ml) + 2 Prosource TF20 provides 1600 kcal (30 kcal/kg), 105 g pro (2.0g/kg), 132 g CHO, 516 ml free water, and 0 g fiber daily.      Future/Additional Recommendations:  Monitor for likely PEGJ placement     EVALUATION OF THE PROGRESS TOWARD GOALS   Diet: NPO + TF    Nutrition Support: access: NDT placed 24    Formula/schedule/modulars:  -__: Goal TF: Novasource Renal (or equivalent) @ 30 ml/hr (720 ml) + 2 Prosource TF20 provides 1600 kcal (30 kcal/kg), 105 g pro (2.0g/kg), 132 g CHO, 516 ml free water, and 0 g fiber daily.        - : Goal TF: Novasource Renal (or equivalent) @ 30 ml/hr (720 ml) + 1 Prosource Tf20 provides 1520 kcal (29 kcal/kg), 85 g pro (1.6 g/kg), 132 g CHO, 516 ml free water, and 0 g fiber daily.       Free water flushes: 30 mL every 4 hours    Intake/Tolerance: Tube Feedin day average tube feeding infusion of 646 mL (89 % of goal volume) + 7 day average intake of 2 Prosource TF20 packet(s) (100 % of prescribed packets)  = average intake of 1452 kcal/day and 98 g protein/day.     NEW FINDINGS   Plan for trach . Once trach in place, pulnaldo martinez service agreeable with PEG/J tube placement for feeding per provider note . Patient and family aware of plan for PEGJ, no questions/concerns for writer.     GI:  Last BM: 24  rectal tube output: 410 mL ; 115 mL ; 750 mL ; 450 mL ; 825 mL ; 975 ml     Weight:  Most Recent Weight: 50 kg (110 lb 3.7 oz)  on 24 via Bed scale  Body mass index is 19.53 kg/m .  + 5.2 L from admission per I/O.  Wt fluctuates but overall  stable     Meds:  Vitamin B and C complex w/ folic acid; TUMS S, T, Th, Sat; vitamin D3 50 mcg MWF  SSI Q4H; NPH insulin BID  Micafungin  Protonix  Bactrim  Tacrolimus  Valcyte    Labs:   06/30/24 15:27 07/01/24 03:46 07/01/24 16:37 07/02/24 03:59   Sodium 137 138 137 138   Potassium 3.8 3.6 3.8 3.6   Chloride 106 107 105 108 (H)   Carbon Dioxide (CO2) 21 (L) 22 23 22   Urea Nitrogen 28.1 (H) 28.1 (H) 29.2 (H) 28.2 (H)   Creatinine 0.88 0.89 0.95 0.91   GFR Estimate 74 73 68 71   Calcium 7.9 (L) 7.8 (L) 7.6 (L) 8.1 (L)   Magnesium 2.5 (H) 2.5 (H) 2.4 (H) 2.5 (H)   Phosphorus 4.6 (H) 4.7 (H) 4.7 (H) 4.8 (H)   Albumin 2.7 (L) 2.6 (L) 2.7 (L) 2.8 (L)   Protein Total  5.0 (L)  5.4 (L)   Alkaline Phosphatase  173 (H)  176 (H)   ALT  36  37   AST  25  26   Bilirubin Direct  0.38 (H)  0.38 (H)   Bilirubin Total  0.6  0.6   Calcium Ionized Whole Blood 4.2 (L) 4.5 4.3 (L) 4.5   Glucose 173 (H) 170 (H) 155 (H) 161 (H)       Renal:  CRRT    Respiratory:   Intubated on mechanical vent     MALNUTRITION  % Intake: No decreased intake noted  % Weight Loss: None noted  Subcutaneous Fat Loss: Facial region:  mild   Muscle Loss: Temporal:  mild and Thoracic region (clavicle, acromium bone, deltoid, trapezius, pectoral):  mild  Fluid Accumulation/Edema: Mild  Malnutrition Diagnosis: Moderate malnutrition in the context of acute illness/disease    Previous Goals   Total avg nutritional intake to meet a minimum of 25 kcal/kg and 1.5 g PRO/kg daily (per dosing wt 52.8 kg).  Evaluation: Met    Previous Nutrition Diagnosis  Inadequate oral intake related to intubation as evidenced by NPO with nutrition support needed to meet nutrition needs.    Evaluation: No change    CURRENT NUTRITION DIAGNOSIS  Inadequate oral intake related to intubation as evidenced by NPO with nutrition support needed to meet nutrition needs.     INTERVENTIONS  Implementation  Enteral Nutrition - continue as ordered       Goals  Total avg nutritional intake to meet a  "minimum of 25 kcal/kg and 1.5 g PRO/kg daily (per dosing wt 52.8 kg).    Monitoring/Evaluation  Progress toward goals will be monitored and evaluated per protocol.      Olesya Velasco MS, RDN, LD  4C MICU RD  Vocera - \"4C Clinical Dietitian\"  Weekend/Holiday RD - \"Weekend Clinical Dietitian\"    "

## 2024-07-02 NOTE — PROGRESS NOTES
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 Nephrology consulted for management of HD while admitted.      Interval History :   Mrs Blue continues on CRRT, will stop this am as she appears on dry side and labs are stable after CRRT for the past week.  Planning trach tomorrow, will review chemistries and I/O's in the am but I suspect she can hold on next run until 7/4.      Assessment & Recommendations:   ESRD-ESKD: pt dialyzes MWF at [facility] (ph [phone], f [phone]) with Dr. Glasgow. Run time: 3.5 hrs. EDW 52.5 kg. Access: L AVF. +heparin 1,500u bolus.                  -Appreciate team placing tri-alysis line     -Stopping CRRT today, will try to tx to iHD with next run likely 7/4                -No need for new dialysis consent, continuing long term HD for ESRD.                -L AVF with remote hx of needing intervention, no issue recently.      Outpt Rx:       Volume-At EDW but still on vent and may have lost some muscle mass loss with acute illness.      Pulm-Admitted with hypercapnic resp failure, suspected PNA. ID involved, getting bronch.  Currently on Zosyn, bactrim, Posaconazole, Azithromycin and Vanco x1 dose so far.       Electrolytes-Stable on CRRT, 4k baths, checking labs BID.      BMD-Stable on CRRT, has replacements ordered with CRRT order set.      Anemia-Hgb ~9 and stable/improving, last PRBC's 6/26 on venofer 50mg weekly but will hold while treating for infection. On Mircera 60mcg [id number], will cover with short term Epo while admitted when stable enough for HD.       [facility]      Time  "spent: 50 minutes on this date of encounter for chart review, physical exam, medical decision making and co-ordination of care.      Seen and discussed with Dr Barbosa     Recommendations were communicated to primary team via verbal communication.     ADRIÁN Lo CNS  Clinical Nurse Specialist  [phone]    Review of Systems:   I reviewed the following systems:  ROS not done due to vent/sedation    Physical Exam:   I/O last 3 completed shifts:  In: 2416.92 [I.V.:1241.92; NG/GT:635]  Out: 3545.5 [Other:3170.5; Stool:375]   /44 (BP Location: Right leg)   Pulse 78   Temp 97  F (36.1  C) (Axillary)   Resp 25   Ht 1.6 m (5' 3\")   Wt 50 kg (110 lb 3.7 oz)   LMP 06/07/2014 (Exact Date)   SpO2 91%   BMI 19.53 kg/m       GENERAL APPEARANCE: Intubated but alert.    EYES: No scleral icterus  Pulmonary: lungs Rhonchi to auscultation with equal breath sounds bilaterally  CV: Regular rhythm, normal rate   - Edema none  GI: soft, nontender, normal bowel sounds  MS: no evidence of inflammation in joints, no muscle tenderness  : No Basilio  SKIN: no rash, warm, dry  NEURO: No focal deficits    Labs:   All labs reviewed by me  Electrolytes/Renal -   Recent Labs   Lab Test 07/02/24  1206 07/02/24  0802 07/02/24  0409 07/02/24  0359 07/01/24  1640 07/01/24  1637 07/01/24  0353 07/01/24  0346   NA  --   --   --  138  --  137  --  138   POTASSIUM  --   --   --  3.6  --  3.8  --  3.6   CHLORIDE  --   --   --  108*  --  105  --  107   CO2  --   --   --  22  --  23  --  22   BUN  --   --   --  28.2*  --  29.2*  --  28.1*   CR  --   --   --  0.91  --  0.95  --  0.89   * 116* 152* 161*   < > 155*   < > 170*   CHELSEY  --   --   --  8.1*  --  7.6*  --  7.8*   MAG  --   --   --  2.5*  --  2.4*  --  2.5*   PHOS  --   --   --  4.8*  --  4.7*  --  4.7*    < > = values in this interval not displayed.       CBC -   Recent Labs   Lab Test 07/02/24  0359 07/01/24  0346 06/30/24  0332   WBC 6.8 7.8 6.3   HGB 9.0* 9.0* 8.9* "   * 131* 126*       LFTs -   Recent Labs   Lab Test 07/02/24  0359 07/01/24  1637 07/01/24  0346 06/30/24  1527 06/30/24  0332   ALKPHOS 176*  --  173*  --  178*   BILITOTAL 0.6  --  0.6  --  0.8   ALT 37  --  36  --  38   AST 26  --  25  --  26   PROTTOTAL 5.4*  --  5.0*  --  4.9*   ALBUMIN 2.8* 2.7* 2.6*   < > 2.6*    < > = values in this interval not displayed.       Iron Panel -   Recent Labs   Lab Test 02/03/21  0415 12/13/18  1033 08/01/18  0921   IRON 51 16* 93   IRONSAT 36 7* 37   YOLA  --  302* 571*           Current Medications:  Current Facility-Administered Medications   Medication Dose Route Frequency Provider Last Rate Last Admin    acetaminophen (TYLENOL) tablet 325 mg  325 mg Oral or Feeding Tube Q4H Jailyn Lou MD   325 mg at 07/02/24 1208    acetylcysteine (MUCOMYST) 10 % nebulizer solution 4 mL  4 mL Inhalation BID Velez Reyes, German, MD   4 mL at 07/02/24 0850    azithromycin (ZITHROMAX) tablet 250 mg  250 mg Oral or Feeding Tube Daily Velez Reyes, German, MD   250 mg at 07/02/24 0806    B and C vitamin Complex with folic acid (NEPHRONEX) liquid 5 mL  5 mL Per Feeding Tube Daily Awa Watt MD   5 mL at 07/02/24 0807    calcium carbonate (TUMS) chewable tablet 500 mg  500 mg Oral Once per day on Sunday Tuesday Thursday Saturday Josesito Pratt MD   500 mg at 07/02/24 0812    chlorhexidine (PERIDEX) 0.12 % solution 15 mL  15 mL Mouth/Throat Q12H Chio Cortez MD   15 mL at 07/02/24 0805    epoetin alisha-epbx (RETACRIT) injection 4,000 Units  4,000 Units Intravenous Once per day on Monday Wednesday Friday Velez Reyes, German, MD   4,000 Units at 07/01/24 0845    hydrocortisone sodium succinate PF (solu-CORTEF) injection 25 mg  25 mg Intravenous BID Velez Reyes, German, MD   25 mg at 07/02/24 0805    insulin aspart (NovoLOG) injection (RAPID ACTING)  1-12 Units Subcutaneous Q4H Edin Davis MD   1 Units at 07/02/24 1208    insulin NPH injection 8 Units  8  Units Subcutaneous BID Velez Reyes, German, MD   8 Units at 07/02/24 0807    levalbuterol (XOPENEX) neb solution 0.63 mg  0.63 mg Nebulization 4x Daily Gareth Mosquera MD   0.63 mg at 07/02/24 1146    linezolid (ZYVOX) tablet 600 mg  600 mg Oral or Feeding Tube Q12H Jailyn Lou MD   600 mg at 07/02/24 1208    [Held by provider] magnesium chloride CR tablet 1,070 mg  1,070 mg Oral Once per day on Sunday Tuesday Thursday Saturday Josesito Pratt MD        metoprolol tartrate (LOPRESSOR) tablet 25 mg  25 mg Oral BID Velez Reyes, German, MD   25 mg at 07/02/24 0806    micafungin (MYCAMINE) 150 mg in sodium chloride 0.9 % 100 mL intermittent infusion  150 mg Intravenous Q24H Chio Cortez  mL/hr at 07/02/24 0010 150 mg at 07/02/24 0010    midodrine (PROAMATINE) tablet 15 mg  15 mg Oral TID w/meals Kajal Chong, APRN CNP   15 mg at 07/02/24 1302    minocycline (MINOCIN) 100 mg in sodium chloride 0.9 % 100 mL intermittent infusion  100 mg Intravenous Q12H Jailyn Lou  mL/hr at 07/02/24 1208 100 mg at 07/02/24 1208    montelukast (SINGULAIR) tablet 10 mg  10 mg Oral At Bedtime Velez Reyes, German, MD   10 mg at 07/01/24 2219    pantoprazole (PROTONIX) 2 mg/mL suspension 40 mg  40 mg Per Feeding Tube Daily Josesito Pratt MD   40 mg at 07/02/24 1208    posaconazole (NOXAFIL) DR tablet TBEC 300 mg  300 mg Per Feeding Tube Daily Josesito Pratt MD   300 mg at 07/02/24 1208    [Held by provider] predniSONE (DELTASONE) tablet 5 mg  5 mg Oral Daily Velez Reyes, German, MD   5 mg at 06/21/24 0822    protein modular (PROSOURCE TF20) packet 1 packet  1 packet Per Feeding Tube BID Awa Watt MD   1 packet at 07/02/24 0807    QUEtiapine (SEROquel) tablet 50 mg  50 mg Oral or Feeding Tube At Bedtime Kajal Chong APRN CNP   50 mg at 07/01/24 2218    ramelteon (ROZEREM) tablet 8 mg  8 mg Oral At Bedtime Velez Reyes, German, MD   8 mg at 07/01/24 2218    sodium  chloride (NEBUSAL) 3 % neb solution 3 mL  3 mL Nebulization BID Velez Reyes, German, MD   3 mL at 07/02/24 1146    sodium chloride (PF) 0.9% PF flush 10 mL  10 mL Intracatheter Q8H Kajal Chong, APRN CNP   10 mL at 07/01/24 0956    sodium chloride (PF) 0.9% PF flush 10 mL  10 mL Intracatheter Q8H Kajal Chong, APRN CNP   10 mL at 07/02/24 0014    [START ON 7/3/2024] sulfamethoxazole-trimethoprim (BACTRIM) 400-80 MG per tablet 1 tablet  1 tablet Oral or Feeding Tube Once per day on Monday Wednesday Friday Randi James MD        tacrolimus (GENERIC) suspension 0.8 mg  0.8 mg Oral or Feeding Tube BID IS Yonny Orona MD   0.8 mg at 07/02/24 0806    traZODone (DESYREL) half-tab 25 mg  25 mg Per Feeding Tube At Bedtime Jailyn Lou MD        [START ON 7/5/2024] valGANciclovir (VALCYTE) solution 450 mg  450 mg Per Feeding Tube Once per day on Monday Friday Randi James MD        Vitamin D3 (CHOLECALCIFEROL) tablet 50 mcg  50 mcg Oral Once per day on Monday Wednesday Friday Velez Reyes, German, MD   50 mcg at 07/01/24 0900     Current Facility-Administered Medications   Medication Dose Route Frequency Provider Last Rate Last Admin    dexmedeTOMIDine (PRECEDEX) 4 mcg/mL in sodium chloride 0.9 % 100 mL infusion  0.1-1.2 mcg/kg/hr (Dosing Weight) Intravenous Continuous Chio Cortez MD   Stopped at 07/02/24 1030    dextrose 10% infusion   Intravenous Continuous PRN Velez Reyes, German, MD   Stopped at 07/02/24 1300    dextrose 10% infusion   Intravenous Continuous PRN Awa Watt MD        phenylephrine (DANDY-SYNEPHRINE) 50 mg in NaCl 0.9 % 250 mL infusion  0.1-6 mcg/kg/min (Dosing Weight) Intravenous Continuous Chio Cortez MD   Stopped at 07/02/24 1010

## 2024-07-02 NOTE — PROGRESS NOTES
Brief Otolaryngology Note    Given operating room schedule- Trach scheduled tomorrow at 130; patient has NJ tube in place and has post-pyloric feeds. Planning bronch today with pulmonology to evaluate previously placed right mainstem bronchi stent.    - Hold feeds per anesthesia guidelines (post-pyloric), NPO at midnight preferred  - hold heparin as medically able  - medically optimize per primary  - Consent to be obtained today    James Argueta MD  Otolaryngology Head and Neck Surgery Resident, PGY-5  Please page on call Otolaryngology resident with questions.

## 2024-07-02 NOTE — PLAN OF CARE
ICU End of Shift Summary. See flowsheets for vital signs and detailed assessment.    Changes this shift: Periods of lucidity but continues to have hallunciations and confusion. Zyprexa given with minimal response. Did not sleep overnight. Labile BP- no and off Mehul to meet goals. Mitts in place for pulling at lines/tubes.  TF off at 0000 for planned trach.     Plan: Trach in OR today      Goal Outcome Evaluation:      Plan of Care Reviewed With: patient    Overall Patient Progress: no changeOverall Patient Progress: no change    Outcome Evaluation: trach today

## 2024-07-02 NOTE — PROGRESS NOTES
Transplant Infectious Diseases Inpatient Consultation      Kecia Blue MRN# [id number]   YOB: 1960 Age: 63 year old   Date of Admission and time: 6/18/2024  1:47 PM     Reason for consult: Sepsis, hypoxic respiratory failure         Recommendations:   For her pneumonia and UTI  Completed 7 days ceftazidime for E coli UTI (Dates: 6/21-6/28)  Continue IV minocycline 100 mg q12h for Stenotrophomonas for total of 14 days (6/20-7/5)  Continue PO linezolid 600 mg q12 h for her VRE from urine and BAL cultures with plans to complete 10 day course for possible PNA (6/25-7/5)  For her Candida guillermondii and 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     Attestation:  Total duration of visit including chart review, reviewing labs and imaging, interviewing and examining the patient, documentation, and sending communication to the primary treating team, all at the same day of this encounter, is: >80 minutes.       Lissett Lewis MD  Transplant Infectious Diseases Attending  [phone]          Synopsis of Immune Status and Presentation:   Transplant Summary  Transplants:  3/1/2018 (Lung); POD  2314.  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 Found to hae mucous plugs and R-sided bronchial stenosis on BAL (6/19/24). Underwent R sided stent placement with purulent discharge noted. She developed ongoing septic shock and respiratory failure with mucous plugs on BAL.         Active Problems and Infectious Diseases Issues:   Acute hypercapnic and hypoxic respiratory failure  Shock, resolved  Bilateral multilobar pneumonia  R mainstem bronchial stenosis s/p dilation (2/15/24 and 6/20/24) with stent placement   (6/20/24)  BAL cultures growing VSE faecalis, VRE faecium, Stenotrophomonas maltophilia, and C guillermondii   BAL cultures with VSE faecalis, VRE faecium, Stenotrophomonas, and C guillermondi. Note that the Stenotrophomonas has been a long-term colonizer (dates back to 9/2019). The Enterococcus is a pretty uncommon cause of invasive pneumonia, as is the C. Guillermondii.  However, given her shock and hypoxic respiratory failure, we have opted to treat all of these pathogens. There is some thought that her dilation on 6/20/24 may have stirred up her pathogens. She is also being treated with steroids for ?ARDS and has volume overload, for which she is receiving CRRT. Currently close to her dry weight. She was started on steroid taper for possible ARDS on 6/21/24. Will plan to complete a 14 day course of minocycline for Stenotrophomonas (Dates: 6/20-7/5). Although it is much less likely to be a cause of invasive pneumonia, we will complete a 10-day course of linezolid for possible VRE pneumonia (Dates: 6/25-7/5).            Pyuria  Urine cultures with E coli and VRE faecium (6/19/24)  Completed 7 days antibiotics for E coli (6/21-6/28). VRE faecium started receiving adequate treatment on 6/21/24 with Linezolid. No need for additional antibiotics for bacteruria once pneumonia has been treated (see above).            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 Has been on treatment with voriconazole ([id number])-->posaconazole 2020-current. 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 This is an uncommon cause of invasive disease, but have opted to treat given tenuous status and positive BAL galactomannan. Interestingly, repeat BAL on 6/21 with negative galactomannan and no growth. Blood galactomannan (6/24) negative. At this point, has remained on posaconazole (trough 2.5 on 6/19). Was started on micafungin on 6/25/24. Since Aspergillus ocharaceus can have more variable susceptibilities to advanced aozles, will await susceptibility testing results to determine final treatment plan.     C guilliermondii complex from respiratory cultures (6/19 and 6/24)   Grew on BAL cultures from 6/19 and ET tube cultures from 6/24. Unlikely to be a true pathogen. Initially grew from BAL on 6/19 and again on ET tube cultures from 6/24. At this point, she is on micafungin for coverage since 6/25 for Aspergillus, which will cover.     CMV Viremia  Low-grade viremia ranging from just detected to 42 international units starting on 5/1/2024. Remains on prophylactic valvanciclovir since 6/24/24, given steroid use. Doing weekly CMV VL.     Transplant Checklist  - QTc interval: 438 (6/18/24)  - Pneumocystis prophylaxis: Bactrim Single Strength daily for life (h/o PJP)   - Bacterial prophylaxis:  Azithromycin  - Fungal prophylaxis: Posaconazole long-term, given Aspergillus fumigatus empyema.   - Serostatus & viral prophylaxis: CMV D+/R+, EBV D+/R+   - Immunization status:  Needs Spring COVID-19 booster, PCV-20  - Gamma globulin status:   Recent Labs   Lab Test 06/19/24  0839      - Antibiotic allergies: Vancomycin: erythroderma with itching. Will need  premedication with Benadryl prior to giving.         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 Decreased to 135k 2/2022. Neg 6/27/22. Increased to 1 million in 11/2023 s/p 1 dose of rituximab and decreasing/undetected in 05/2024. Negative EBV 6/19/24.  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 BAL showed septate hyphae. 7/2020 had hypodense mass in left lung with biopsy showing fungal hyphae, cx aspergillus. Started on voriconazole in 2019, changed to posaconazole in 2020, she remains on 300 mg daily.  1/2021 BAL cx with steno: tx ceftazidime for 2 weeks.  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 Recovery from this required tracheostomy.  2019 - She had also grown Actinomyces from multiple BAL         Interval Events    Since I last saw her on 7/1/2024, she has been doing well.  Underwent BAL today to visualize stents. Planning for trach placement with ENT tomorrow. No new complaints or concerns.          Immunizations:     Immunization History   Administered Date(s) Administered    COVID-19 Bivalent 12+ (Pfizer) 11/18/2022    COVID-19 MONOVALENT 12+ (Pfizer) 03/10/2021, 03/30/2021, 10/12/2021    Hep B, Adult (Heplisav- B) 06/04/2020, 07/04/2020, 08/05/2020, 12/07/2020, 06/15/2022, 07/20/2022, 08/17/2022, 01/18/2023, 07/19/2023, 08/16/2023    HepB, Unspecified 06/04/2020, 07/04/2020, 08/05/2020, 12/07/2020    Hepatitis B, Adult 06/04/2020, 07/04/2020, 08/05/2020, 12/07/2020    Influenza Vaccine 18-64 (Flublok) 10/09/2019    Influenza Vaccine 65+ (Fluzone HD) 02/24/2016    Influenza Vaccine >6 months,quad, PF 10/22/2014, 10/26/2015, 02/24/2016, 11/29/2016, 12/01/2017, 10/29/2018, 10/12/2020, 10/03/2022    Influenza Vaccine IM Ages 6-35 Months 4 Valent (PF) 02/24/2016    Influenza, seasonal,  injectable, PF 02/24/2016    Mantoux Tuberculin Skin Test 10/30/2019, 11/07/2019    Pneumo Conj 13-V (2010&after) 02/24/2016    Pneumococcal 23 valent 11/20/2014, 12/18/2019    Pneumococcal, Unspecified 02/24/2016    TD,PF 7+ (Tenivac) 03/11/1996    TDAP (Adacel,Boostrix) 02/04/2008    TDAP Vaccine (Boostrix) 12/13/2018    Td (Adult), Adsorbed 03/11/1996    Tdap (Adult) Unspecified Formulation 02/04/2008    Zoster recombinant adjuvanted (SHINGRIX) 03/06/2019, 06/05/2019            Allergies:     Allergies   Allergen Reactions    Isopropyl Alcohol Other (See Comments)     The alcohol burns the open areas on her skin when used as a skin prep for procedures    Vancomycin Other (See Comments)     Diffuse erythroderma with itching (improved with Benadryl) after receiving vancomycin over 1 hour. 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 Can use vancomycin in the future, but please give over slower infusion time (at least 2 hours) and premedicate with Benadryl.             Medications:   Medications that Require Transfusion:   Current Facility-Administered Medications   Medication Dose Route Frequency Provider Last Rate Last Admin    dexmedeTOMIDine (PRECEDEX) 4 mcg/mL in sodium chloride 0.9 % 100 mL infusion  0.1-1.2 mcg/kg/hr (Dosing Weight) Intravenous Continuous Chio Cortez MD   Stopped at 07/02/24 1030    dextrose 10% infusion   Intravenous Continuous PRN Velez Reyes, German, MD 30 mL/hr at 07/02/24 0011 1,000 mL at 07/02/24 0011    dextrose 10% infusion   Intravenous Continuous PRN Awa Watt MD        dialysate for CVVHD & CVVHDF (Phoxillum BK4/2.5)  12.5 mL/kg/hr CRRT Continuous Jonna Rodas  mL/hr at 07/02/24 0749 12.5 mL/kg/hr at 07/02/24 0749    heparin (porcine) 20,000 units in 20 mL ANTICOAGULANT infusion (syringe from pharmacy)  500 Units/hr CRRT Continuous Hardy Tran APRN CNS   Stopped at 07/02/24 1038     phenylephrine (DANDY-SYNEPHRINE) 50 mg in NaCl 0.9 % 250 mL infusion  0.1-6 mcg/kg/min (Dosing Weight) Intravenous Continuous Chio Cortez MD   Stopped at 07/02/24 1010    POST-filter replacement solution for CVVHD & CVVHDF (Phoxillum BK4/2.5)   CRRT Continuous Jonna Rodas  mL/hr at 07/02/24 0042 New Bag at 07/02/24 0042    PRE-filter replacement solution for CVVHD & CVVHDF (Phoxillum BK4/2.5)  12.5 mL/kg/hr CRRT Continuous Jonna Rodas  mL/hr at 07/02/24 0749 12.5 mL/kg/hr at 07/02/24 0749     Scheduled Medications:   Current Facility-Administered Medications   Medication Dose Route Frequency Provider Last Rate Last Admin    acetaminophen (TYLENOL) tablet 325 mg  325 mg Oral or Feeding Tube Q4H Jailyn Lou MD   325 mg at 07/02/24 0806    acetylcysteine (MUCOMYST) 10 % nebulizer solution 4 mL  4 mL Inhalation BID Velez Reyes, German, MD   4 mL at 07/02/24 0850    azithromycin (ZITHROMAX) tablet 250 mg  250 mg Oral or Feeding Tube Daily Velez Reyes, German, MD   250 mg at 07/02/24 0806    B and C vitamin Complex with folic acid (NEPHRONEX) liquid 5 mL  5 mL Per Feeding Tube Daily Awa Watt MD   5 mL at 07/02/24 0807    calcium carbonate (TUMS) chewable tablet 500 mg  500 mg Oral Once per day on Sunday Tuesday Thursday Saturday Josesito Pratt MD   500 mg at 07/02/24 0812    chlorhexidine (PERIDEX) 0.12 % solution 15 mL  15 mL Mouth/Throat Q12H Chio Cortez MD   15 mL at 07/02/24 0805    epoetin alisha-epbx (RETACRIT) injection 4,000 Units  4,000 Units Intravenous Once per day on Monday Wednesday Friday Velez Reyes, German, MD   4,000 Units at 07/01/24 0845    hydrocortisone sodium succinate PF (solu-CORTEF) injection 25 mg  25 mg Intravenous BID Velez Reyes, German, MD   25 mg at 07/02/24 0805    insulin aspart (NovoLOG) injection (RAPID ACTING)  1-12 Units Subcutaneous Q4H Edin Davis MD   1 Units at 07/02/24 0410    insulin NPH injection 8 Units   8 Units Subcutaneous BID Velez Reyes, German, MD   8 Units at 07/02/24 0807    levalbuterol (XOPENEX) neb solution 0.63 mg  0.63 mg Nebulization 4x Daily Gareth Mosquera MD   0.63 mg at 07/02/24 1146    linezolid (ZYVOX) tablet 600 mg  600 mg Oral or Feeding Tube Q12H Jailyn Lou MD   600 mg at 07/02/24 0010    [Held by provider] magnesium chloride CR tablet 1,070 mg  1,070 mg Oral Once per day on Sunday Tuesday Thursday Saturday Josesito Pratt MD        metoprolol tartrate (LOPRESSOR) tablet 25 mg  25 mg Oral BID Velez Reyes, German, MD   25 mg at 07/02/24 0806    micafungin (MYCAMINE) 150 mg in sodium chloride 0.9 % 100 mL intermittent infusion  150 mg Intravenous Q24H Chio Cortez  mL/hr at 07/02/24 0010 150 mg at 07/02/24 0010    midodrine (PROAMATINE) tablet 15 mg  15 mg Oral TID w/meals Kajal Chong, APRN CNP   15 mg at 07/02/24 0547    minocycline (MINOCIN) 100 mg in sodium chloride 0.9 % 100 mL intermittent infusion  100 mg Intravenous Q12H Jailyn Lou  mL/hr at 07/01/24 2303 100 mg at 07/01/24 2303    montelukast (SINGULAIR) tablet 10 mg  10 mg Oral At Bedtime Velez Reyes, German, MD   10 mg at 07/01/24 2219    pantoprazole (PROTONIX) 2 mg/mL suspension 40 mg  40 mg Per Feeding Tube Daily Josesito Pratt MD   40 mg at 07/01/24 1247    posaconazole (NOXAFIL) DR tablet TBEC 300 mg  300 mg Per Feeding Tube Daily Josesito Pratt MD   300 mg at 07/01/24 1237    [Held by provider] predniSONE (DELTASONE) tablet 5 mg  5 mg Oral Daily Velez Reyes, German, MD   5 mg at 06/21/24 0822    protein modular (PROSOURCE TF20) packet 1 packet  1 packet Per Feeding Tube BID Awa Watt MD   1 packet at 07/02/24 0807    QUEtiapine (SEROquel) tablet 50 mg  50 mg Oral or Feeding Tube At Bedtime Kajal Chong APRN CNP   50 mg at 07/01/24 2218    ramelteon (ROZEREM) tablet 8 mg  8 mg Oral At Bedtime Velez Reyes, German, MD   8 mg at 07/01/24 2218    sodium  chloride (NEBUSAL) 3 % neb solution 3 mL  3 mL Nebulization BID Velez Reyes, German, MD   3 mL at 07/02/24 1146    sodium chloride (PF) 0.9% PF flush 10 mL  10 mL Intracatheter Q8H Kajal Chong, APRN CNP   10 mL at 07/01/24 0956    sodium chloride (PF) 0.9% PF flush 10 mL  10 mL Intracatheter Q8H Kajal Chong, APRN CNP   10 mL at 07/02/24 0014    [START ON 7/3/2024] sulfamethoxazole-trimethoprim (BACTRIM) 400-80 MG per tablet 1 tablet  1 tablet Oral or Feeding Tube Once per day on Monday Wednesday Friday Randi James MD        tacrolimus (GENERIC) suspension 0.8 mg  0.8 mg Oral or Feeding Tube BID IS Yonny Orona MD   0.8 mg at 07/02/24 0806    traZODone (DESYREL) half-tab 25 mg  25 mg Per Feeding Tube At Bedtime Jailyn Lou MD        [START ON 7/5/2024] valGANciclovir (VALCYTE) solution 450 mg  450 mg Per Feeding Tube Once per day on Monday Friday Randi James MD        Vitamin D3 (CHOLECALCIFEROL) tablet 50 mcg  50 mcg Oral Once per day on Monday Wednesday Friday Velez Reyes, German, MD   50 mcg at 07/01/24 0900               Physical Exam:   Temp: 97.2  F (36.2  C) Temp src: Axillary   Pulse: 79   Resp: 24 SpO2: 97 % O2 Device: Mechanical Ventilator      Wt Readings from Last 4 Encounters:   07/02/24 50 kg (110 lb 3.7 oz)   05/16/24 54.4 kg (120 lb)   03/13/24 55.3 kg (122 lb)   03/05/24 57.7 kg (127 lb 4.8 oz)     Constitutional: awake, alert, intubated, but responsive.   Head, ENT, Eyes, and Neck: Normocephalic,ET tube in place. No scleral icterus.   PERRL, EOMI, pink conjunctivae, non-icteric sclera.   Neck supple without rigidity.   Neurologic: Patient is moving all extremities without focal deficit, no focal sensory loss.   Lungs: Coarse bilaterally, no accessory muscle use, No wheezes.   CVS: RRR, normal S1/S2, no murmur, PMI was not displaced.   Abdomen: non-tender, non-distended, no masses, no bruit, no shifting dullness, normal BS.   Musculoskeletal: +Trace edema  "bilaterally. no ulcers, normal ROM of all joints, no swelling or erythema of any of joints and no tenderness to palpation.   Skin: no induration,no rashes noted. Lines in place without surrounding erythema/edema.            Data:   No results found for: \"ACD4\"    Inflammatory Markers    Recent Labs   Lab Test 06/18/24  1418 10/29/23  0642 10/28/23  0725 10/07/19  1221 10/06/19  1444 09/13/19  0934 09/11/19  2325 08/22/19  0820   SED 10 66* 58* 93*  --  111* 102*  --    CRP  --   --   --   --  25.0* 58.0* 66.0* 47.0*       Immune Globulin Studies     Recent Labs   Lab Test 06/19/24  0839 10/27/23  0701 10/24/23  1033 09/15/21  0915 01/31/21  0441 01/25/21  0406 10/27/19  0621 03/01/18  0356 02/19/18  0759    979  --  1,198 763  --  936 698 1,790*   IGM  --   --   --   --   --   --   --   --  502*   IGE  --   --  <2  --   --  <2  --   --  <2   IGA  --   --   --   --   --   --   --   --  425*   IGG1  --   --   --   --   --   --   --   --  1,300*   IGG2  --   --   --   --   --   --   --   --  131*   IGG3  --   --   --   --   --   --   --   --  101   IGG4  --   --   --   --   --   --   --   --  1*       Metabolic Studies       Recent Labs   Lab Test 07/02/24  0802 07/02/24  0409 07/02/24  0359 07/02/24  0017 07/01/24 2025 07/01/24  1640 07/01/24  1637 07/01/24  0353 07/01/24  0346 06/30/24  1537 06/30/24  1527 06/30/24  0338 06/30/24  0332 06/29/24 2013 06/29/24  1604 06/27/24  0355 06/27/24 0348 06/25/24  0350 06/25/24 0348 06/24/24  1942 06/24/24  1816 06/24/24  0013 06/23/24 2006   NA  --   --  138  --   --   --  137  --  138  --  137  --  137  --  139   < > 137   < > 136  --   --    < >  --    POTASSIUM  --   --  3.6  --   --   --  3.8  --  3.6  --  3.8  --  3.9  --  3.7   < > 3.6   < > 4.1  --   --    < >  --    CHLORIDE  --   --  108*  --   --   --  105  --  107  --  106  --  107  --  108*   < > 106   < > 105  --   --    < >  --    CO2  --   --  22  --   --   --  23  --  22  --  21*  --  21*  --  23   < " > 22   < > 22  --   --    < >  --    ANIONGAP  --   --  8  --   --   --  9  --  9  --  10  --  9  --  8   < > 9   < > 9  --   --    < >  --    BUN  --   --  28.2*  --   --   --  29.2*  --  28.1*  --  28.1*  --  29.0*  --  30.5*   < > 34.4*   < > 40.7*  --   --    < >  --    CR  --   --  0.91  --   --   --  0.95  --  0.89  --  0.88  --  0.96*  --  1.06*   < > 0.90   < > 1.02*  --   --    < >  --    GFRESTIMATED  --   --  71  --   --   --  68  --  73  --  74  --  67  --  59*   < > 72   < > 62  --   --    < >  --    * 152* 161* 115* 136* 139* 155*   < > 170*   < > 173*   < > 192*   < > 154*   < > 255*  265*   < > 279*   < >  --    < >  --    A1C  --   --   --   --   --   --   --   --   --   --   --   --   --   --   --   --   --   --  5.1  --   --   --   --    CHELSEY  --   --  8.1*  --   --   --  7.6*  --  7.8*  --  7.9*  --  7.4*  --  7.2*   < > 7.8*   < > 8.0*  --   --    < >  --    PHOS  --   --  4.8*  --   --   --  4.7*  --  4.7*  --  4.6*  --  4.8*  --  4.4   < > 4.3   < > 3.9  --   --    < >  --    MAG  --   --  2.5*  --   --   --  2.4*  --  2.5*  --  2.5*  --  2.4*  --  2.4*   < > 2.3   < > 2.4*  --   --    < >  --    LACT  --   --   --   --   --   --   --   --   --   --   --   --   --   --   --   --   --   --   --   --  0.9  --  0.9   CKT  --   --   --   --   --   --   --   --  26  --   --   --   --   --   --   --  18*  --   --   --   --    < >  --     < > = values in this interval not displayed.       Hepatic Studies    Recent Labs   Lab Test 07/02/24  0359 07/01/24  1637 07/01/24  0346 06/30/24  1527 06/30/24  0332 06/29/24  1604 06/29/24  0359 06/28/24  1646 06/28/24  0427 06/27/24  1636 06/27/24  0348 06/19/24  0613 06/18/24  1803 09/15/21  0915 05/01/21  0654 01/27/21  0414 01/26/21  0425   BILITOTAL 0.6  --  0.6  --  0.8  --  0.8  --  0.7  --  1.1   < > 0.6   < >  --    < > 0.8   ALKPHOS 176*  --  173*  --  178*  --  146  --  122  --  135   < > 235*   < >  --    < > 184*   ALBUMIN 2.8* 2.7* 2.6* 2.7*  2.6* 2.5* 2.5*   < > 2.3*   < > 2.4*   < > 3.6   < >  --    < > 2.0*   AST 26  --  25  --  26  --  22  --  16  --  20   < > 39   < >  --    < > 11   ALT 37  --  36  --  38  --  36  --  32  --  38   < > 39   < >  --    < > 30   LDH  --   --   --   --   --   --   --   --   --   --   --   --   --   --  164  --  187   GGT  --   --   --   --   --   --   --   --   --   --   --   --  147*  --   --   --   --     < > = values in this interval not displayed.       Pancreatitis testing    Recent Labs   Lab Test 06/27/24  1636 06/18/24  1803 10/25/23  1356 05/26/22  0750 09/15/21  0915 01/24/21  0000 02/19/20  0713 12/31/19  1033 10/24/19  2032 10/21/19  1451 10/06/19  1444 09/11/19  2325 03/04/18  0353 02/19/18  0759   AMYLASE  --   --   --   --   --   --   --   --   --   --   --   --   --  58   LIPASE 39 40 24  --   --   --   --   --  212 119 172 127  --   --    TRIG  --   --   --  155* 68 242* 77 98  --   --   --   --  191* 115       Hematology Studies      Recent Labs   Lab Test 07/02/24  0359 07/01/24  0346 06/30/24  0332 06/29/24  0359 06/28/24  0427 06/27/24  0348 06/26/24  1200 06/26/24  0310 06/25/24  0348 06/24/24  0335   WBC 6.8 7.8 6.3 4.8 4.4 5.2  --  3.0* 3.0* 3.6*   ANEU  --   --   --  4.2 3.3 3.2  --  1.7 1.4* 2.8   ALYM  --   --   --  0.3* 0.8 1.6  --  1.1 1.1 0.3*   HSERRI  --   --   --  0.2 0.2 0.4  --  0.2 0.3 0.4   AEOS  --   --   --  0.0 0.0 0.0  --  0.0 0.0 0.0   HGB 9.0* 9.0* 8.9* 7.8* 8.0* 8.4*   < > 6.9* 7.0* 7.5*   HCT 28.3* 29.4* 29.1* 25.1* 25.6* 27.1*  --  23.1* 23.0* 23.7*   * 131* 126* 111* 98* 112*  --  86* 70* 69*    < > = values in this interval not displayed.       Clotting Studies    Recent Labs   Lab Test 06/18/24  1418 03/05/24  0925 02/14/24  1050 10/24/23  1033 02/19/21  0458 02/12/21  0315 08/04/18  0709 08/03/18  0624 06/19/18  0000 06/11/18  0925 03/12/18  1123 03/05/18  1648   INR 0.98 0.98 0.96 0.99   < >  --    < > 1.09   < > 0.92   < > 1.11   PTT  --   --   --   --   --  28   "--  26  --  26  --  24    < > = values in this interval not displayed.       Arterial Blood Gas Testing    Recent Labs   Lab Test 06/30/24  1537 06/26/24  0310 06/25/24  1209 06/24/24  2126 06/24/24  1653   PH 7.38 7.35 7.29* 7.33* 7.30*   PCO2 41 44 51* 45 50*   PO2 154* 137* 60* 133* 80   HCO3 24 24 24 24 25   O2PER 45 50 50 60 60        Urine Studies     Recent Labs   Lab Test 06/19/24  1214 01/24/21  1729 10/21/19  2240 09/12/19  0125 08/07/19  1512   URINEPH 6.5 5.0 5.0 7.5* 7.0   NITRITE Negative Negative Negative Negative Negative   LEUKEST Large* Moderate* Large* Negative Trace*   WBCU >182* 34* 115* 3 19*       Vancomycin Levels     Recent Labs   Lab Test 06/19/24  0613 10/26/23  0606 09/21/21  1911 01/28/21  0412 01/26/21  0425 01/25/21  1259   VANCOMYCIN 15.6 18.6 18.8 21.6 31.4* 15.1       Tobramycin levels     No lab results found.    Gentamicin levels    No lab results found.    Tacrolimus levels    Invalid input(s): \"TACROLIMUS\", \"TAC\", \"TACR\"      Latest Ref Rng & Units 7/2/2024     3:59 AM 7/1/2024     4:37 PM 7/1/2024     3:46 AM 6/30/2024     3:27 PM 6/30/2024     3:32 AM   Transplant Immunosuppression Labs   Creat 0.51 - 0.95 mg/dL 0.91  0.95  0.89  0.88  0.96    Urea Nitrogen 8.0 - 23.0 mg/dL 28.2  29.2  28.1  28.1  29.0    WBC 4.0 - 11.0 10e3/uL 6.8   7.8   6.3    Neutrophil % 74   76   77        Cyclosporine levels    Invalid input(s): \"CYCLOSPORINE\", \"CYC\"    Mycophenolate levels    Invalid input(s): \"MYPA\", \"MYP\"    Sirolimus levels    Invalid input(s): \"SIROLIMUS\", \"SIR\", \"RAPA\"    CSF testing   No lab results found.      Last check of C difficile  C Diff Toxin B PCR   Date Value Ref Range Status   02/13/2021 Negative NEG^Negative Final     Comment:     Negative: C. difficile target DNA sequences NOT detected, presumed negative   for C.difficile toxin B or the number of bacteria present may be below the   limit of detection for the test.  FDA approved assay performed using [employer] " "real-time PCR.  A negative result does not exclude actual disease due to C. difficile and may   be due to improper collection, handling and storage of the specimen or the   number of organisms in the specimen is below the detection limit of the assay.       C Difficile Toxin B by PCR   Date Value Ref Range Status   06/22/2024 Negative Negative Final     Comment:     A negative result does not exclude actual disease due to C. difficile and may be due to improper collection, handling and storage of the specimen or the number of organisms in the specimen is below the detection limit of the assay.       Virology:    No results found for: \"H6RES\"    EBV DNA Copies/mL   Date Value Ref Range Status   05/01/2021 38,186 (A) EBVNEG^EBV DNA Not Detected [Copies]/mL Final   02/22/2021 75,[phone] (A) EBVNEG^EBV DNA Not Detected [Copies]/mL Final   01/25/2021 5,619 (A) EBVNEG^EBV DNA Not Detected [Copies]/mL Final   12/09/2020 10,686 (A) EBVNEG^EBV DNA Not Detected [Copies]/mL Final   09/09/2020 2,935 (A) EBVNEG^EBV DNA Not Detected [Copies]/mL Final   03/09/2020 8,918 (A) EBVNEG^EBV DNA Not Detected [Copies]/mL Final   02/19/2020 38,419 (A) EBVNEG^EBV DNA Not Detected [Copies]/mL Final   12/18/2019 11,933 (A) EBVNEG^EBV DNA Not Detected [Copies]/mL Final   11/11/2019 9,669 (A) EBVNEG^EBV DNA Not Detected [Copies]/mL Final   10/24/2019 13,391 (A) EBVNEG^EBV DNA Not Detected [Copies]/mL Final   08/01/2018 EBV DNA Not Detected EBVNEG^EBV DNA Not Detected [Copies]/mL Final       CMV Antibody IgG   Date Value Ref Range Status   03/01/2018 Canceled, Test credited 0.0 - 0.8 AI Final     Comment:     Test reordered as correct code  SEE CMV QUANTITATIVE PCR     03/01/2018 >8.0 (H) 0.0 - 0.8 AI Final     Comment:     Positive  Antibody index (AI) values reflect qualitative changes in antibody   concentration that cannot be directly associated with clinical condition or   disease state.     02/19/2018 >8.0 (H) 0.0 - 0.8 AI Final     Comment: "     Positive  Antibody index (AI) values reflect qualitative changes in antibody   concentration that cannot be directly associated with clinical condition or   disease state.         Toxoplasma Antibody IgG   Date Value Ref Range Status   02/19/2018 <3.0 0.0 - 7.1 IU/mL Final     Comment:     Negative- Absence of detectable Toxoplasma gondii IgG antibodies. A negative   result does not rule out acute infection.  The magnitude of the measured result is not indicative of the amount of   antibody present. The concentrations of anti-Toxoplasma gondii IgG in a given   specimen determined with assays from different manufacturers can vary due to   differences in assay methods and reagent specificity.         Lissett Lewis MD  [facility]  Contact information available via [facility] Paging/Directory     07/02/2024

## 2024-07-02 NOTE — PROGRESS NOTES
CRRT STATUS NOTE    DATA:  Time:  6:00 AM  Pressures WNL:  YES  Filter Status:  WDL    Problems Reported/Alarms Noted:  None.    Supplies Present:  YES    ASSESSMENT:  Patient Net Fluid Balance:  Net -1.2L @ midnight, -233 ml @ 0600.  Vital Signs:  HR 73, /51 (75), SpO2 98%   Labs:  K 3.6, Mg 2.5, Phos 4.8, iCa 4.5, Hgb 9.0, Plt 100  Goals of Therapy:  0-50cc/h    INTERVENTIONS:   None.    PLAN:  Continue to monitor circuit daily and change set q72 hours or PRN for clotting/clogging. Please call CRRT RN with any quesitons/problems.

## 2024-07-02 NOTE — PROGRESS NOTES
"Bronchoscopy Risk Assessment Guidelines      A. Patient symptoms to consider when assessing pulmonary TB risk are:    I. Cough greater than 3 weeks; and fever, hemoptysis, pleuritic chest    pain, weight loss greater than 10 lbs, night sweats, fatigue, infiltrates on    upper lobes or superior segments of lower lobes, cavitation on chest    x-ray.   B. Patient risk factors to consider when assessing pulmonary TB risk are:    I. Exposure to known TB case, foreign-born persons (within 5 years of    arrival to [place]), residence in a crowded setting (correctional facility,     long-term care center, etc.), persons with HIV or immunosuppression.    Patients with symptoms and risk factors should generally be considered \"suspect risk\" and bronchoscopies should be performed in airborne precautions.    This patient has NO KNOWN RISK of Tuberculosis (proceed with bronchoscopy)    Specimens sent: no  Complications: None  Scope used: #[id number] Adult  Attending Physician: Dr. Amauri Paulino, RT on 7/2/2024 at 12:55 PM  "

## 2024-07-02 NOTE — PROGRESS NOTES
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  The patient was admitted on 24 for progressive hypoxia with dyspnea and worsening hypercapnia in ED requiring intubation likely d/t post-obstructive PNA 2/2 right bronchus stenosis.  Bronch confirming severe stenosis of right anastomosis with extensive RLL plugging.  IP bronch () with laser tissue debulking RMB stenosis, airway balloon dilation of RMB and BI, and placement of stent RMB to BI and bifurcating stent in RUL.  S/p volume resuscitation and transition to CRRT  for hypotension.  Increased agitation/delirium on  with increasing desaturation led to need for increased sedation.  Recurrence of paroxysmal afib with RVR first noted .  Remains intubated with pulmonary edema on imaging and septic shock requiring pressor support, limited tolerance of PST.        Today's recommendations:  - Continue antimicrobial regimen per transplant ID  - Repeat bronchoscopy in coordination with IP, plan for today  - Plan for trach with ENT 7/3 at 1330, revisit need for PEG/J placement end of week vs early next week per Dr. Christensen  - Tacrolimus level ordered   - Prednisone chronic dosing to resume if hydrocortisone <20 mg daily  - Awaiting susceptibilities on  Aspergillus per transplant ID  - CMV weekly monitoring (qTuesday), continue VGCV ppx with tentative plan for 3 weeks beyond completion of stress dose steroids      Septic shock:  Acute on chronic hypoxic/hypercapneic respiratory failure:  Post-obstructive multi-lobar PNA:  Right bronchial stenosis with RML collapse: Admitted with 2 weeks of progressive hypoxia, dyspnea, congested cough, and fatigue.  Chronic hypoxia with 2L NC, increased from 3 to 4L over this time with acute decompensation on day of admission associated with hypercapnia.  VBG 7/17/85 in ED s/p intubation.  IP bronch 2/15 s/p tissue debulking without stent placement.  Negative ID workup: respiratory panel, COVID, MRSA nares, PJP, Legionella, Strep pneumoniae, cocci, Histo, CrAg, fungitell x2, and A. galactomannan (blood).  IgG WNL.  Procal mildly elevated at 0.24 initially (then elevated to 1.77 6/20), LA normal, but febrile to 100.7.  TTE on admission grossly normal.  CT with RUL/RLL consolidative opacities, RML collapse, and narrowing of BI and distal RLL bronchus.  Started on norepi 6/19.  Bronch per MICU (6/19) with severe stenosis starting at right anastomosis, inspection with smaller scope revealing for extensive RLL mucous plugging.  Bronch per IP (6/20) with laser tissue debulking RMB stenosis, airway balloon dilation of RMB and BI, and placement of stent RMB to BI and bifurcating stent in RUL.  Intermittent/low grade fevers persisting.  Respiratory cultures with Steno, VSE, VRE, Aspergillus ochraceus, and Elidia guilliermondii.  Chest CT (6/29) with multifocal panlobar opacities and small bibasilar pleural effusions.  Remains on full vent support, limited tolerance of PST.    - ABX: linezolid (6/25-7/5), and minocycline (6/20-7/5, Steno) per transplant ID; s/p ceftazidime (6/25-6/28), meropenem (6/21-6/24), daptomycin (6/21-6/24), levofloxacin (6/21-6/23), Zosyn (6/18-6/21), azithromycin 500 mg daily (6/18-6/20), and vancomycin (6/18)   -  Nebs: levalbuterol QID, Mucomyst 10% BID alternating with 3% HTS (6/22), will need to be on NS nebs BID upon discharge per IP  - Vent management and weaning per MICU  - Stress dose steroids per MICU (started 6/21)  - Repeat bronchoscopy in coordination with IP, plan for today  - Plan for trach with ENT 7/3 at 1330, revisit need for PEG/J placement end of week vs early next week per Dr. Christensen     S/p bilateral sequential lung transplant (BSLT) for ILD:   Progressive CLAD: Most recent OP visit in February.  DSA negative 6/19 (last positive noted 2018).  Repeat ImmuKnow (6/19) 87, very low but IST adjustment deferred per Dr. Graham given acuity and steroids (after ImmuKnow level).  Repeat Prospera remains high at 2.3 on 6/19, may be artificially elevated with current infection, but also notably high at 2.42 on 2/14.  - Repeat Prospera and ImmuKnow once fully recovered from PNA  - PTA Singulair, azithromycin, and Breo inhaler (resume once extubated)     Immunosuppression:  On 2-drug IST since November d/t leukopenia and recurrent infections  - Tacrolimus 0.8 mg BID (increased 6/29).  Goal level 8-10.  Repeat level ordered 7/5.  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  - ABX and infectious work up as above     Aspergillus ochraceus:  H/o Aspergillus empyema:  S/p empyema drainage and ampho B instillation.  Previously on voriconazole, transitioned to posaconazole and managed by transplant ID as OP with last visit 3/13.  Calcified fungal elements remain with additional recurrent effusion (likely associated with fluid disturbances r/t iHD), but surgical intervention previously deferred d/t risk.  Posaconazole level 2.5 on 6/19.  - Awaiting susceptibilities  on Aspergillus (bronch culture 6/19) per transplant ID  - Posaconazole 300 mg daily chronically, micafungin 150 mg daily (6/25) per transplant ID     H/o CMV viremia: Recurrent CMV viremia historically.  VGCV ppx most recently stopped 4/12.  Recent CMV low positive at 46 (6/12), <35 (6/18), and 39 (6/25, 7/2).  - CMV weekly monitoring (qTuesday) with steroid increase  - VGCV ppx (renally dosed) given stress dose steroids (6/24) with tentative plan for 3 weeks beyond completion of stress dose steroids      EBV viremia: S/p rituximab 12/13/23 x1 dose.  Most recent EBV negative 6/18.     Dilated CBD:  Suspected acute cholecystitis: MRCP (6/29) with CBD dilation with stones and cholelithiasis without cholecystitis.  GI signed off 6/30, recommending general surgery consult if RUQ pain persists.     ESRD on iHD: Kidney evaluation started as OP.  On qMWF iHD schedule, no missed sessions.  Transitioned to CRRT on 6/20, management per renal and MICU with fluid removal as able.      VRE urine culture: Noted 6/19, >100k GNB and VRE faecium, ABX as above with management per transplant ID and MICU.    We appreciate the excellent care provided by the MICU team.  Recommendations communicated via in person rounding and this note.  Will continue to follow along closely, please do not hesitate to call with any questions or concerns.    Patient discussed with Dr. Christnesen.    Cindy Mcadams PA-C  [facility]  Pulmonary CF/Transplant     Subjective & Interval History:     Intermittently with hallucinations and confusions, poor sleep.  On/off pressor, currently off.  PS 12/7 x1 <3h and 12/5 x1 ~1h yesterday otherwise full vent support with PEEP 5 and FiO2 40%.  Remains on CRRT, net negative 1.2L yesterday.  Minimal secretions.    Review of Systems:     ROS as above otherwise significantly limited due to intubation and sedation.    Physical Exam:     All notes, images, and labs from past 24 hours (at minimum) were reviewed.    Vital  "signs:  Temp: 97  F (36.1  C) Temp src: Axillary BP: 104/44 Pulse: 78   Resp: 25 SpO2: 91 % O2 Device: Mechanical Ventilator Oxygen Delivery: 6 LPM Height: 160 cm (5' 3\") Weight: 50 kg (110 lb 3.7 oz)  I/O:   Intake/Output Summary (Last 24 hours) at 7/2/2024 1339  Last data filed at 7/2/2024 1300  Gross per 24 hour   Intake 2388.73 ml   Output 3186.3 ml   Net -797.57 ml     Constitutional: Lying in bed, in no apparent distress.   HEENT: Eyes with pink conjunctivae, anicteric.  Orally intubated via ETT.  Nasal FT.  PULM: Mildly diminished air flow bilaterally.  Few coarse crackles.  No rhonchi, no wheezes.  Non-labored breathing on full vent support.  CV: Normal S1 and S2.  RRR.  No murmur, gallop, or rub.  No peripheral edema.   ABD: NABS, soft, nontender, nondistended.    MSK: Moves all extremities.  + muscle wasting.   NEURO: Alert and conversant.   SKIN: Warm (except hands/feet cold, bilateral toes purple (consistent with Raynaud's flares per pt.'s ), dry, fragile.   PSYCH: Calm.     Data:     LABS    CMP:   Recent Labs   Lab 07/02/24  1206 07/02/24  0802 07/02/24  0409 07/02/24  0359 07/01/24  1640 07/01/24  1637 07/01/24  0353 07/01/24  0346 06/30/24  1537 06/30/24  1527 06/30/24  0338 06/30/24  0332 06/29/24  0405 06/29/24  0359   NA  --   --   --  138  --  137  --  138  --  137  --  137   < > 138   POTASSIUM  --   --   --  3.6  --  3.8  --  3.6  --  3.8  --  3.9   < > 3.5   CHLORIDE  --   --   --  108*  --  105  --  107  --  106  --  107   < > 107   CO2  --   --   --  22  --  23  --  22  --  21*  --  21*   < > 22   ANIONGAP  --   --   --  8  --  9  --  9  --  10  --  9   < > 9   * 116* 152* 161*   < > 155*   < > 170*   < > 173*   < > 192*   < > 199*   BUN  --   --   --  28.2*  --  29.2*  --  28.1*  --  28.1*  --  29.0*   < > 28.2*   CR  --   --   --  0.91  --  0.95  --  0.89  --  0.88  --  0.96*   < > 0.88   GFRESTIMATED  --   --   --  71  --  68  --  73  --  74  --  67   < > 74   CHELSEY  --   --   " "--  8.1*  --  7.6*  --  7.8*  --  7.9*  --  7.4*   < > 7.5*   MAG  --   --   --  2.5*  --  2.4*  --  2.5*  --  2.5*  --  2.4*   < > 2.4*   PHOS  --   --   --  4.8*  --  4.7*  --  4.7*  --  4.6*  --  4.8*   < > 3.9   PROTTOTAL  --   --   --  5.4*  --   --   --  5.0*  --   --   --  4.9*  --  4.7*   ALBUMIN  --   --   --  2.8*  --  2.7*  --  2.6*  --  2.7*  --  2.6*   < > 2.5*   BILITOTAL  --   --   --  0.6  --   --   --  0.6  --   --   --  0.8  --  0.8   ALKPHOS  --   --   --  176*  --   --   --  173*  --   --   --  178*  --  146   AST  --   --   --  26  --   --   --  25  --   --   --  26  --  22   ALT  --   --   --  37  --   --   --  36  --   --   --  38  --  36    < > = values in this interval not displayed.     CBC:   Recent Labs   Lab 07/02/24  0359 07/01/24  0346 06/30/24  0332 06/29/24  0359   WBC 6.8 7.8 6.3 4.8   RBC 2.90* 2.94* 2.95* 2.58*   HGB 9.0* 9.0* 8.9* 7.8*   HCT 28.3* 29.4* 29.1* 25.1*   MCV 98 100 99 97   MCH 31.0 30.6 30.2 30.2   MCHC 31.8 30.6* 30.6* 31.1*   RDW 17.9* 17.3* 17.0* 16.8*   * 131* 126* 111*       INR: No lab results found in last 7 days.    Glucose:   Recent Labs   Lab 07/02/24  1206 07/02/24  0802 07/02/24  0409 07/02/24  0359 07/02/24  0017 07/01/24 2025   * 116* 152* 161* 115* 136*       Blood Gas:   Recent Labs   Lab 06/30/24  1537 06/26/24  0310   O2PER 45 50       Culture Data No results for input(s): \"CULT\" in the last 168 hours.    Virology Data:   Lab Results   Component Value Date    FLUAH1 Not Detected 06/18/2024    FLUAH3 Not Detected 06/18/2024    RC8172 Not Detected 06/18/2024    IFLUB Not Detected 06/18/2024    RSVA Not Detected 06/18/2024    RSVB Not Detected 06/18/2024    PIV1 Not Detected 06/18/2024    PIV2 Not Detected 06/18/2024    PIV3 Not Detected 06/18/2024    HMPV Not Detected 06/18/2024    HRVS Negative 01/24/2021    ADVBE Negative 01/24/2021    ADVC Negative 01/24/2021    ADVC Negative 12/23/2020    ADVC Negative 10/07/2019       Historical " CMV results (last 3 of prior testing):  Lab Results   Component Value Date    CMVQNT <35 (A) 06/18/2024    CMVQNT Not Detected 03/05/2024    CMVQNT Not Detected 12/19/2023     Lab Results   Component Value Date    CMVLOG 1.6 07/02/2024    CMVLOG 1.6 06/25/2024    CMVLOG <1.5 06/18/2024       Urine Studies    Recent Labs   Lab Test 06/19/24  1214 01/24/21  1729   URINEPH 6.5 5.0   NITRITE Negative Negative   LEUKEST Large* Moderate*   WBCU >182* 34*       Most Recent [place] Pulmonary Function Testing (FVC/FEV1 only)  FVC-Pre   Date Value Ref Range Status   02/14/2024 0.85 L    12/19/2023 1.04 L    11/21/2023 0.85 L    10/24/2023 0.96 L      FVC-%Pred-Pre   Date Value Ref Range Status   02/14/2024 29 %    12/19/2023 36 %    11/21/2023 29 %    10/24/2023 33 %      FEV1-Pre   Date Value Ref Range Status   02/14/2024 0.80 L    12/19/2023 0.89 L    11/21/2023 0.78 L    10/24/2023 0.87 L      FEV1-%Pred-Pre   Date Value Ref Range Status   02/14/2024 34 %    12/19/2023 38 %    11/21/2023 33 %    10/24/2023 37 %        IMAGING    No results found for this or any previous visit (from the past 48 hour(s)).

## 2024-07-02 NOTE — PROCEDURES
INTERVENTIONAL PULMONOLOGY       Procedure(s):    A flexible bronchoscopy  Airway exam      Indication:  stent evaluation    Attending of Record:  MD Randi Segura MD      Interventional Pulmonary Fellow   [facility]    Trainees Present:   None     Medications:      6 ml 1% lidocaine  0.5 mg versed  25 mcg fentanyl    Sedation Time:   Total sedation time was Choose Duration: 5 minutes of continuous bedside 1:1 monitoring.    Time Out:  Performed    The patient's medical record has been reviewed.  The indication for the procedure was reviewed.  The necessary history and physical examination was performed and reviewed.  The risks, benefits and alternatives of the procedure were discussed with the the patient in detail and she had the opportunity to ask questions.  I discussed in particular the potential complications including risks of minor or life-threatening bleeding and/or infection, respiratory failure, vocal cord trauma / paralysis, pneumothorax, and discomfort. Sedation risks were also discussed including abnormal heart rhythms, low blood pressure, and respiratory failure. All questions were answered to the best of my ability.  Verbal and written informed consent was obtained.  The proposed procedure and the patient's identification were verified prior to the procedure by the physician and the nurse.    The patient was assessed for the adequacy for the procedure and to receive medications.   Mental Status:  Alert and following commands.  Airway examination:  ETT was in place.   Pulmonary:  Breathing comfortably on the vent.   CV:  Regular rate  ASA Grade:  (III)  Mild systemic disease    After clinical evaluation and reviewing the indication, risks, alternatives and benefits of the procedure the patient was deemed to be in satisfactory condition to undergo the procedure.      Immediately before administration of medications the patient was re-assessed for adequacy to receive  sedatives including the heart rate, respiratory rate, mental status, oxygen saturation, blood pressure and adequacy of pulmonary ventilation. These same parameters were continuously monitored throughout the procedure.    A Tuberculosis risk assessment was performed:  The patient has no known RISK of Tuberculosis    The procedure was performed in a negative airflow room: The patient could not be moved to a negative airflow room because of critical illness and instability    Maneuvers / Procedure:    Airway Examination: A complete airway examination was performed from the distal trachea to the subsegmental level in each lobe of both lungs.  Pertinent findings include right main stem and bronchus intermedius Viktoriya stent was patent with bifurcating RUL iCAST stent seemed patent as well. Both stents were in good position. Left lung anastomosis looked normal.     Any disposable equipment was visually inspected and deemed to be intact immediately post procedure.      Relevant Pictures    Main Luisana and R main stem Viktoriya stent proximal end        Right lung Viktoriya stent proximal end with RUL bifurcating iCAST stent .      iCAST stent take off       RBI Viktoriya stent distal end        LMB anastomosis           Assessment and Recommendations:   Successful completion of flexible bronchoscopy with airway exam and stent evaluation.   Follow up bronchoscopy for stent re-evaluation in 2 months. Need to be done in the OR. Please contact us upon discharge so we can schedule the follow up bronchoscopy as an outpatient.            Chapin Sierra  Interventional pulmonary fellow  Pager: [phone]

## 2024-07-02 NOTE — PROGRESS NOTES
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 Hospital course complicated by hypotension, delirium, and prolonged respiratory failure.    Changes today  - Bronchoscopy today to evaluate bronchial stent  - Per ENT, patient to have trach tomorrow at 1330 for prolonged respiratory failure, NPO at midnight  - Surgery consult placed for PEG/J placement following trach, pulm transplant in agreement  - Off and on phenylephrine this morning, still getting midodrine 15 mg TID  - Patient remains agitated and hallucinating overnight, not sleeping at night and sleeping during day.   - Discontinue prn Seroquel, start hydroxyzine prn and trazadone 25 mg at night  - CRRT currently off per nephrology due to stable labs and dry weight achieved and trach planned for tomorrow. Plan to try iHD on 7/4 per nephrology.  - Continue SBT trials on pressure support 10/5      PLAN:    Neuro:  # Pain   - Acetaminophen 325mg q4H  - IV fentanyl prn     # Sedation  - RAAS goal 0 to -1  - Precedex gtt - wean as able  - Daily sedation vacation    # Toxic metabolic encephalopathy, improving  Presented with 2-3 days of lethargy, decreased appetite, and fatigue. Previously has improved with BiPAP/intubation. Ongoing lethargy in setting of sepsis and delirium in the setting of high-dose steroid therapy. Has been hallucinating, reportedly seeing snakes (6/28). Plan to wean down on precedex gtt (see above).  - Quetiapine 50 mg at bedtime   - Discontinue quetiapine 25 mg BID  prn   - Start hydroxyzine prn for anxiety   - Ramalteon and add trazodone 25mg for sleep   - Weaning stress dose steroids as tolerated  - NPI q4h for 3-5 days to determine if periods of decreased arousal related to sedation medications    Pulmonary:  # Acute on chronic hypoxic hypercapnic respiratory failure  # HAP, Stenotrophomonas maltophilia, Enterococcus faecalis, and Candida guilliermondi complex  # Severe pulmonary edema  # Acute respiratory distress syndrome, resolving  Presented to the ED on 6/18/2024 with acute on chronic hypoxic hypercapnic respiratory failure. Transferred to MICU and intubated on overnight on 6/18. Workup significant for Stenotrophomonas, Enterococcus, and Candida pneumonia. Course was c/b progression to ARDS (6/21-22) with elevated plateau pressures. Pt remain intubated d/t ongoing pulmonary edema iso ESRD on iHD. CXR (6/26) with similar pulmonary opacities compared to 6/24, left > right.    - Antibiotics, as below  - Volume management with CRRT, as below  - Mechanical ventilation, wean as able, meets extubation readiness  - Daily SBT (AM and PM) on pressure support 10/5  - Per ENT, patient to have trach tomorrow at 1330 for prolonged respiratory failure, NPO at midnight  - Pulmonary toilet  - Mucomyst BID  - Hypertonic saline BID, alternate with mucomyst  - Albuterol neb QID  - Did not tolerate volera    Vent Mode: (S) CMV/AC  (Continuous Mandatory Ventilation/ Assist Control)  FiO2 (%): 50 %  Resp Rate (Set): 18 breaths/min  Tidal Volume (Set, mL): 320 mL  PEEP (cm H2O): 5 cmH2O  Pressure Support (cm H2O): 12 cmH2O  Resp: 25  PIP 31  Plat pressure 32.4    # Recurrent right middle bronchus stenosis s/p dilation and stent (6/20/2024)  Has history (bronchoscopy 2/15/24) demonstrating narrowing of right mainstem transplant anastomosis and bronchus intermedius. CT chest (6/18/2024) and bronchoscopy (6/19/2024) demonstrated occlusion of right middle bronchus. With concern for  post-obstructive pneumonia, interventional pulmonology was consulted, and completed bronchoscopy (6/20/2024) with tissue debulking, right middle bronchus balloon dilation to 11 mm, stent placement in right main bronchus, and bifurcating cast placement in right upper bronchus. Plan for saline nebs BID and outpatient bronchoscopy in 6 weeks.   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  - Transplant pulm consult, appreciate recs  - Tacrolimus level (6/27) was 4.5 (low)  - PTA nebs  - Fluticasone-viilanterol (breo ellipta) every day - HOLD with respiratory infection  - Montelukast every day   - Immunosuppressants per Tx Pulm:   - PTA Tacrolimus (dosing per tx pulm)  - PTA Prednisone 5 mg daily - HOLD with stress dose steroids  - PTA azathioprine - HOLD per Transplant pulmonology with repeat infections    Cardiovascular:  # Septic shock, persistent   On 6/19/2024, developed sepsis (hypotension 80s/50s, tachypnea 24) in the setting of stenotrophomonas pneumonia/enterococcus faecium VRE UTI. Etiology of shock likely distributive iso sepsis; less likely hypovolemic (not pulling volumes with CRRT), medication-associated (weaned off of propofol gtt) or obstructive (low suspicion for PE, echo 6/19 without evidence of cardiac dysfunction). Has been on and off pressors during course of ICU stay.    - IV pressors, wean as able   - Phenylephrine gtt in setting of blood pressure support from midodrine  - MAP goal >65  - Stress dose steroids  - Hydrocortisone 50mg q6H (6/21-6/26); 50 mg BID (6/26-6/28); 25 mg BID (6/28-x)  - Fludrocortisone 0.1 mg qday (6/21-6/28); 0.05 mg qday (6/28-30)  - If hemodynamically stable, consider transitioning to PTA prednisone at increased 40 mg daily (then titrate to 5 mg)  - Continue midodrine 15mg TID for  pressor weaning     # Demand Ischemia, resolved    Presented with elevated troponin (peak 499). Not associated with chest pain or hypoxia. EKG without ST elevations/depressions or T wave inversions. Suspect demand ischemia in the setting of sepsis. Will continue to monitor symptoms and continuous telemetry.      # Paroxysmal A-Fib, improved  # Pulmonary Hypertension   History of pulmonary hypertension (45 mmHg, echo 10/2023) in the setting of ILD and paroxysmal afib on PTA metoprolol tartrate BID. Not on anticoagulation for afib.   - PTA metoprolol tartrate 25mg BID     GI/Nutrition:  # Nutrition  NJT placed (6/19/2024).   - Nutrition consult  - Tube feeds 30ml/hr; at goal    # Diarrhea, resolved  On 6/21, had 8 bowel movements. Occurred in the setting of scheduled bowel regimen and starting new antibiotics (meropenem, levofloxacin). C diff negative.   - Bowel regimen  - Miralax daily PRN   - Senna BID    # C/f cholecystitis vs choledocholithiasis/distal biliary obstruction  # Cholelithiasis with gallbladder wall thickening  # Elevated bili  # Elevated alk phos, resolved  Admission with elevated alkaline phosphatase (237) with elevated GGT consistent with hepatic etiology, resolved. On 6/27, pt endorsed RUQ pain with palpation and had elevated body temps (99-100F) on CRRT. LFTs and bili (6/27) was also noted have been trending upwards over the past few days. Labs on 6/28 improved, but RUQ U/S (6/28) showed gallbladder wall thickening measuring up to 6 mm with shadowing gallstones present and pericholecystic fluid; common bile duct dilated to 11 mm. Findings c/w cholelithiasis with c/f cholecystitis vs choledocholithiasis/distal biliary obstruction. Lipase negative   - T.Bili 0.8- low concern for obstruction  - Consult GI, appreciate recs  - MRCP completed showing borderline dilated CBD up to 1.1 cm, no significant intrahepatic biliary dilation, no evidence of choledocholithiasis, no evidence of acute  cholecystitis.    Renal/Fluids/Electrolytes:  # ESRD on iHD (M,W,F)  History of ESRD 2/2 Tacrolimus toxicity on HD (MWF). With soft blood pressures, placed RIJ (6/20/2024) and started CRRT (6/20/2024).  - Nephrology consult  - Goal net negative 500ml  - CRRT currently off per nephrology due to stable labs and dry weight achieved and trach planned for tomorrow. Plan to try iHD on 7/4 per nephrology.  - Renally-dosed medications  - RN electrolyte replacement    Endocrine:  # Risk for hyperglycemia with stress-dose steroids  Given high BG in high 200s on stress dose steroids and fludrocortisone, started on insulin gtt on 6/25. Overnight on 6/26, BG dropped to 77, pt given IV bolus of D50W and sugars corrected. Hypoglycemic episode likely iso titrating down the stress dose steroids. S/p insulin gtt (6/25-27).   - NPH 8U BID     ID:  # HAP, Stenotrophomonas maltophilia, Enterococcus faecalis, Aspergillus, and Candida guilliermondi complex  Presented to the ED on 6/18/2024 with SOB and hypercapnic respiratory failure. Found on bronchoscopy (6/19) to have RML obstruction by narrowing bronchus intermedius as well as copious mucopurulent secretions/mucus plugging. Previously treated for Stenotrophonomas/aspergillus pneumonia in 11/2023 with subsequent sputum culture (2/2024) negative for both Stenotrophonomas/Aspergillus. Etiology likely repeat Stenotrophomonas infection vs opportunistic infection from colonized microbe.   - Workup  - 6/18 sputum cx: Stenotrophomonas maltophilia (ceftazidime and levofloxacin R)  - 6/19 BAL cx: Stenotrophomonas maltophilia, Enterococcus faecalis (gentamicin R), Aspergillus (speciation in process)  - 6/21 BAL cx: Stenotrophomonas maltophilia and Enterococcus faecalis VRE  - 6/24 sputum cx: Stenotrophomonas maltophilia, Enterococcus faecalis VRE, and Candida guilliermondi complex  - Transplant pulmonology consult, appreciate recs  - Transplant ID consult, appreciate recs  - Present  antibiotics  - IV minocycline (6/20-x)  - PO linezolid (6/25-x)  - IV micafungin (6/25-x) - until Aspergillus speciation results, still pending  - Past antibiotics  - S/p ceftazidime (6/25-6/28)    - S/p vancomycin (6/18-6/20)   - S/p zosyn (6/18-21)  - S/p Daptomycin (6/21-6/23)  - S/p Meropenem (6/21-6/24)  - S/p Levofloxacin (6/21-6/23)    # Pyuria, Enterococcus faecium VRE and  ESBL E. Coli   Asymptomatic. With progressive IV pressor needs, obtained broad infectious workup which demonstrated UCx (6/19/2024) with Enterococcus faecium VR and ESBL E. Coli. S/p Daptomycin (6/21-6/23) and Meropenem (6/21-6/24).    # Hx Aspergillus PNA (11/2023)  # Hx PJP PNA (1/2021)  # Hx CMV viremia, recurrent  # Hx EBV viremia  - Transplant ID consult, appreciate recs  PTA posaconazole (Treatment for hx of aspergillus PNA)  PTA azithromycin 250mg qd (CLAD ppx)  PTA bactrim (PJP ppx)     Hematology:    # Acute on Chronic Normocytic Anemia, stable  # Thrombocytopenia, chronic  History of chronic anemia in the setting of kidney disease (baseline Hgb 10-11). Upon admission, Hgb 9. May be bone marrow suppression in the setting of chronic illness; potential contribution by blood loss with CRRT filter changes (~150-200c). Peripheral smear (6/18/2024) without evidence of schistocytes.  - Hb stable  - Continue to monitor CBC    Musculoskeletal:  - PT / OT consult     Skin:  - No acute concerns.     General Cares/Prophylaxis:    DVT Prophylaxis: Heparin SQ  GI Prophylaxis: PPI  Restraints: None  Family Communication: , will update over phone or in person  Code Status: Full Code     Lines/tubes/drains:  - PIV x2, midline  - RIJ  - A line  - NGT  - ETT    Disposition:  - Medical ICU     Patient seen and findings/plan discussed with medical ICU staff, Dr. James.    Edin Davis MD  Internal Medicine Resident, PGY-1      Clinically Significant Risk Factors          # Hypocalcemia: Lowest iCa = 4.2 mg/dL in last 2 days, will  monitor and replace as appropriate     # Hypoalbuminemia: Lowest albumin = 2.2 g/dL at 6/21/2024  4:26 PM, will monitor as appropriate   # Thrombocytopenia: Lowest platelets = 100 in last 2 days, will monitor for bleeding                 #Precipitous drop in Hgb/Hct: Lowest Hgb this hospitalization: 6.9 g/dL. Will continue to monitor and treat/transfuse as appropriate.      # Moderate Malnutrition: based on nutrition assessment    # Financial/Environmental Concerns: none                  ====================================  INTERVAL HISTORY:   Patient off and on phenylephrine overnight and this morning, still getting midodrine 15 mg TID  Patient remains agitated and hallucinating overnight, not sleeping at night and sleeping during day.   at bedside and updated.       OBJECTIVE:   1. VITAL SIGNS:   Temp:  [97  F (36.1  C)-98.5  F (36.9  C)] 97  F (36.1  C)  Pulse:  [] 78  Resp:  [18-38] 25  BP: (104)/(44) 104/44  MAP:  [53 mmHg-95 mmHg] 64 mmHg  Arterial Line BP: ()/(39-76) 107/47  FiO2 (%):  [40 %-50 %] 50 %  SpO2:  [88 %-100 %] 91 %  Vent Mode: (S) CMV/AC  (Continuous Mandatory Ventilation/ Assist Control)  FiO2 (%): 50 %  Resp Rate (Set): 18 breaths/min  Tidal Volume (Set, mL): 320 mL  PEEP (cm H2O): 5 cmH2O  Pressure Support (cm H2O): 12 cmH2O  Resp: 25    2. INTAKE/ OUTPUT:   I/O last 3 completed shifts:  In: 2416.92 [I.V.:1241.92; NG/GT:635]  Out: 3545.5 [Other:3170.5; Stool:375]    3. PHYSICAL EXAMINATION:  General: Laying in bed, NAD   HEENT: Atraumatic, moist mucous membranes   Pulm/Resp: Ongoing coarse breath sounds. Mild expiratory wheezing. Good air movement throughout.   CV: RRR, no m/r/g   Abdomen: Soft, non-distended, tender to palpation all quadrants.   Ext: Trace bilateral LE edema, pulses 2+ radial, pedal  Incisions/Skin: No rashes or lesions  Neuro: sleeping, follows 1-step commands and nods and shakes head to questions, moving all extremities    4. LABS:   Arterial Blood Gases    Recent Labs   Lab 06/30/24  1537 06/26/24  0310   PH 7.38 7.35   PCO2 41 44   PO2 154* 137*   HCO3 24 24     Complete Blood Count   Recent Labs   Lab 07/02/24  0359 07/01/24 0346 06/30/24 0332 06/29/24  0359   WBC 6.8 7.8 6.3 4.8   HGB 9.0* 9.0* 8.9* 7.8*   * 131* 126* 111*     Basic Metabolic Panel  Recent Labs   Lab 07/02/24  1206 07/02/24  0802 07/02/24  0409 07/02/24  0359 07/01/24  1640 07/01/24  1637 07/01/24  0353 07/01/24  0346 06/30/24  1537 06/30/24  1527   NA  --   --   --  138  --  137  --  138  --  137   POTASSIUM  --   --   --  3.6  --  3.8  --  3.6  --  3.8   CHLORIDE  --   --   --  108*  --  105  --  107  --  106   CO2  --   --   --  22  --  23  --  22  --  21*   BUN  --   --   --  28.2*  --  29.2*  --  28.1*  --  28.1*   CR  --   --   --  0.91  --  0.95  --  0.89  --  0.88   * 116* 152* 161*   < > 155*   < > 170*   < > 173*    < > = values in this interval not displayed.     Liver Function Tests  Recent Labs   Lab 07/02/24  0359 07/01/24  1637 07/01/24 0346 06/30/24  1527 06/30/24 0332 06/29/24  1604 06/29/24  0359   AST 26  --  25  --  26  --  22   ALT 37  --  36  --  38  --  36   ALKPHOS 176*  --  173*  --  178*  --  146   BILITOTAL 0.6  --  0.6  --  0.8  --  0.8   ALBUMIN 2.8* 2.7* 2.6* 2.7* 2.6*   < > 2.5*    < > = values in this interval not displayed.     Coagulation Profile  No lab results found in last 7 days.      5. RADIOLOGY:   No results found for this or any previous visit (from the past 24 hour(s)).

## 2024-07-03 NOTE — PROGRESS NOTES
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  The patient was admitted on 24 for progressive hypoxia with dyspnea and worsening hypercapnia in ED requiring intubation likely d/t post-obstructive PNA 2/2 right bronchus stenosis.  Bronch confirming severe stenosis of right anastomosis with extensive RLL plugging.  IP bronch () with laser tissue debulking RMB stenosis, airway balloon dilation of RMB and BI, and placement of stent RMB to BI and bifurcating stent in RUL.  S/p volume resuscitation and transition to CRRT  for hypotension, stopped .  Increased agitation/delirium on  with increasing desaturation led to need for increased sedation.  Recurrence of paroxysmal afib with RVR first noted .  Remains intubated with pulmonary edema on imaging and septic shock requiring pressor support, limited/variable tolerance of PST.        Today's recommendations:  - Continue antimicrobial regimen per transplant ID  - Repeat bronch with BAL today based on CT chest findings , will consider steroid burst/taper pending cultures and clinical course  - Plan for trach with ENT today  - Tentative plan for GJ tube placement with IR (timing TBD)  - Palliative care consult  - Kailee (7/3) pending  - DSA (7/3)  pending  - Tacrolimus level ordered 7/5  - Prednisone chronic dosing to resume if hydrocortisone <20 mg daily  - Bactrim MWF for PJP ppx (monitor need to adjust pending HD plan)  - Awaiting susceptibilities on Aspergillus per transplant ID  - CMV weekly monitoring (qTuesday), continue VGCV ppx (renally dosed, monitor need to adjust pending HD plan) with tentative plan for 3 weeks beyond completion of stress dose steroids      Septic shock:  Acute on chronic hypoxic/hypercapneic respiratory failure:  Post-obstructive multi-lobar PNA:  Right bronchial stenosis with RML collapse: Admitted with 2 weeks of progressive hypoxia, dyspnea, congested cough, and fatigue.  Chronic hypoxia with 2L NC, increased from 3 to 4L over this time with acute decompensation on day of admission associated with hypercapnia.  VBG 7/17/85 in ED s/p intubation.  IP bronch 2/15 s/p tissue debulking without stent placement.  Negative ID workup: respiratory panel, COVID, MRSA nares, PJP, Legionella, Strep pneumoniae, cocci, Histo, CrAg, fungitell x2, and A. galactomannan (blood).  IgG WNL.  Procal mildly elevated at 0.24 initially (then elevated to 1.77 6/20), LA normal, but febrile to 100.7.  TTE on admission grossly normal.  CT with RUL/RLL consolidative opacities, RML collapse, and narrowing of BI and distal RLL bronchus.  Started on norepi 6/19.  Bronch per MICU (6/19) with severe stenosis starting at right anastomosis, inspection with smaller scope revealing for extensive RLL mucous plugging.  Bronch per IP (6/20) with laser tissue debulking RMB stenosis, airway balloon dilation of RMB and BI, and placement of stent RMB to BI and bifurcating stent in RUL.  Intermittent/low grade fevers persisting.  Respiratory cultures with Steno, VSE, VRE, Aspergillus ochraceus, and Elidia guilliermondii.  Chest CT (6/29) with multifocal panlobar opacities and small bibasilar pleural effusions.  Remains on full vent support, limited/variable tolerance of  PST.    - ABX: linezolid (6/25-7/5), and minocycline (6/20-7/5, Steno) per transplant ID; s/p ceftazidime (6/25-6/28), meropenem (6/21-6/24), daptomycin (6/21-6/24), levofloxacin (6/21-6/23), Zosyn (6/18-6/21), azithromycin 500 mg daily (6/18-6/20), and vancomycin (6/18)   - Nebs: levalbuterol QID, Mucomyst 10% BID alternating with 3% HTS (6/22), will need to be on NS nebs BID upon discharge per IP  - Vent management and weaning per MICU  - Stress dose steroids per MICU (started 6/21)  - Repeat bronch with BAL today based on CT chest findings 6/29, will consider steroid burst/taper pending cultures and clinical course  - Plan for trach with ENT today  - Tentative plan for GJ tube placement with IR (timing TBD)  - Palliative care consult     S/p bilateral sequential lung transplant (BSLT) for ILD:   Progressive CLAD: Most recent OP visit in February.  DSA negative 6/19 (last positive noted 2018).  Repeat ImmuKnow (6/19) 87, very low but IST adjustment deferred per Dr. Graham given acuity and steroids (after ImmuKnow level).  Repeat Prospera remains high at 2.3 on 6/19, may be artificially elevated with current infection, but also notably high at 2.42 on 2/14.  - Repeat Prospera (7/3) pending  - Repeat DSA (7/3) pending  - Repeat ImmuKnow in 4-6 weeks  - PTA Singulair, azithromycin, and Breo inhaler (resume once extubated)     Immunosuppression:  On 2-drug IST since November d/t leukopenia and recurrent infections  - Tacrolimus 0.8 mg BID (increased 6/29).  Goal level 8-10.  Repeat level ordered 7/5.  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  - ABX and infectious work up as above     Aspergillus ochraceus:  H/o Aspergillus empyema:  S/p empyema drainage and ampho B instillation.  Previously on  voriconazole, transitioned to posaconazole and managed by transplant ID as OP with last visit 3/13.  Calcified fungal elements remain with additional recurrent effusion (likely associated with fluid disturbances r/t iHD), but surgical intervention previously deferred d/t risk.  Posaconazole level 2.5 on 6/19.  - Awaiting susceptibilities on Aspergillus (bronch culture 6/19) per transplant ID  - Posaconazole 300 mg daily chronically, micafungin 150 mg daily (6/25) per transplant ID     H/o CMV viremia: Recurrent CMV viremia historically.  VGCV ppx most recently stopped 4/12.  Recent CMV low positive at 46 (6/12), <35 (6/18), and 39 (6/25, 7/2).  - CMV weekly monitoring (qTuesday) with steroid increase  - VGCV ppx (renally dosed, monitor need to adjust pending HD plan) given stress dose steroids (6/24) with tentative plan for 3 weeks beyond completion of stress dose steroids      EBV viremia: S/p rituximab 12/13/23 x1 dose.  Most recent EBV negative 6/18.     Dilated CBD:  Suspected acute cholecystitis: MRCP (6/29) with CBD dilation with stones and cholelithiasis without cholecystitis.  GI signed off 6/30, recommending general surgery consult if RUQ pain persists.     ESRD on iHD: Kidney evaluation started as OP.  On qMWF iHD schedule, no missed sessions.  Transitioned to CRRT on 6/20, stopped 7/2.  Management per renal and MICU with fluid removal as able.      VRE urine culture: Noted 6/19, >100k GNB and VRE faecium, ABX as above with management per transplant ID and MICU    We appreciate the excellent care provided by the MICU team.  Recommendations communicated via in person rounding and this note.  Will continue to follow along closely, please do not hesitate to call with any questions or concerns.    Patient seen and discussed with Dr. Christensen.    Cindy Mcadams PA-C  [facility]  Pulmonary [facility]/Transplant     Subjective & Interval History:     PS 12/5 40-50% x2 (~1h and ~2h) otherwise full vent support.  CRRT  "stopped yesterday, anuric.  Weight down.  Loose stools, no nausea.      Review of Systems:     ROS as above otherwise significantly limited due to intubation and sedation.     Physical Exam:     All notes, images, and labs from past 24 hours (at minimum) were reviewed.    Vital signs:  Temp: 97.2  F (36.2  C) Temp src: Axillary   Pulse: 80   Resp: 22 SpO2: 100 % O2 Device: Mechanical Ventilator Oxygen Delivery: 6 LPM Height: 160 cm (5' 3\") Weight: 48.5 kg (106 lb 14.8 oz)  I/O:   Intake/Output Summary (Last 24 hours) at 7/3/2024 1347  Last data filed at 7/3/2024 1300  Gross per 24 hour   Intake 1871.66 ml   Output 50 ml   Net 1821.66 ml     Constitutional: Sitting up in chair, in no apparent distress.   HEENT: Eyes with pink conjunctivae, anicteric.  Orally intubated via ETT.  Nasal FT.  PULM: Good air flow bilaterally.  Coarse crackles t/o.  No rhonchi, no wheezes.  Non-labored breathing on full vent support.  CV: Normal S1 and S2.  RRR.  No murmur, gallop, or rub.  No peripheral edema.   ABD: NABS, soft, nondistended.    MSK: Moves all extremities.  + muscle wasting.   NEURO: Opens eyes to voice, able to answer some yes/no questions by nodding/shaking head.  SKIN: Warm, dry, fragile.  Bilateral toes reddened.  PSYCH: Calm.     Data:     LABS    CMP:   Recent Labs   Lab 07/03/24  1117 07/03/24  1115 07/03/24  0815 07/03/24  0429 07/03/24  0422 07/02/24  0409 07/02/24  0359 07/01/24  1640 07/01/24  1637 07/01/24  0353 07/01/24  0346 06/30/24  1537 06/30/24  1527 06/30/24  0338 06/30/24  0332 06/29/24  0405 06/29/24  0359   *  --   --   --  137  --  138  --  137  --  138  --  137  --  137   < > 138   POTASSIUM 3.4  --   --   --  3.3*  --  3.6  --  3.8  --  3.6  --  3.8  --  3.9   < > 3.5   CHLORIDE 103  --   --   --  105  --  108*  --  105  --  107  --  106  --  107   < > 107   CO2 20*  --   --   --  21*  --  22  --  23  --  22  --  21*  --  21*   < > 22   ANIONGAP 11  --   --   --  11  --  8  --  9  --  9  --  " "10  --  9   < > 9   * 121* 119* 142* 154*   < > 161*   < > 155*   < > 170*   < > 173*   < > 192*   < > 199*   BUN 54.2*  --   --   --  47.6*  --  28.2*  --  29.2*  --  28.1*  --  28.1*  --  29.0*   < > 28.2*   CR 1.84*  --   --   --  1.61*  --  0.91  --  0.95  --  0.89  --  0.88  --  0.96*   < > 0.88   GFRESTIMATED 31*  --   --   --  36*  --  71  --  68  --  73  --  74  --  67   < > 74   CHELSEY 8.1*  --   --   --  8.0*  --  8.1*  --  7.6*  --  7.8*  --  7.9*  --  7.4*   < > 7.5*   MAG  --   --   --   --  2.6*  --  2.5*  --  2.4*  --  2.5*  --  2.5*  --  2.4*   < > 2.4*   PHOS  --   --   --   --  6.1*  --  4.8*  --  4.7*  --  4.7*  --  4.6*  --  4.8*   < > 3.9   PROTTOTAL  --   --   --   --   --   --  5.4*  --   --   --  5.0*  --   --   --  4.9*  --  4.7*   ALBUMIN  --   --   --   --   --   --  2.8*  --  2.7*  --  2.6*  --  2.7*  --  2.6*   < > 2.5*   BILITOTAL  --   --   --   --   --   --  0.6  --   --   --  0.6  --   --   --  0.8  --  0.8   ALKPHOS  --   --   --   --   --   --  176*  --   --   --  173*  --   --   --  178*  --  146   AST  --   --   --   --   --   --  26  --   --   --  25  --   --   --  26  --  22   ALT  --   --   --   --   --   --  37  --   --   --  36  --   --   --  38  --  36    < > = values in this interval not displayed.     CBC:   Recent Labs   Lab 07/03/24  0422 07/02/24  0359 07/01/24  0346 06/30/24  0332   WBC 6.5 6.8 7.8 6.3   RBC 2.78* 2.90* 2.94* 2.95*   HGB 8.7* 9.0* 9.0* 8.9*   HCT 27.2* 28.3* 29.4* 29.1*   MCV 98 98 100 99   MCH 31.3 31.0 30.6 30.2   MCHC 32.0 31.8 30.6* 30.6*   RDW 18.4* 17.9* 17.3* 17.0*   PLT 94* 100* 131* 126*       INR: No lab results found in last 7 days.    Glucose:   Recent Labs   Lab 07/03/24  1117 07/03/24  1115 07/03/24  0815 07/03/24  0429 07/03/24  0422 07/03/24  0019   * 121* 119* 142* 154* 160*       Blood Gas:   Recent Labs   Lab 06/30/24  1537   O2PER 45       Culture Data No results for input(s): \"CULT\" in the last 168 hours.    Virology " Data:   Lab Results   Component Value Date    FLUAH1 Not Detected 06/18/2024    FLUAH3 Not Detected 06/18/2024    MB4159 Not Detected 06/18/2024    IFLUB Not Detected 06/18/2024    RSVA Not Detected 06/18/2024    RSVB Not Detected 06/18/2024    PIV1 Not Detected 06/18/2024    PIV2 Not Detected 06/18/2024    PIV3 Not Detected 06/18/2024    HMPV Not Detected 06/18/2024    HRVS Negative 01/24/2021    ADVBE Negative 01/24/2021    ADVC Negative 01/24/2021    ADVC Negative 12/23/2020    ADVC Negative 10/07/2019       Historical CMV results (last 3 of prior testing):  Lab Results   Component Value Date    CMVQNT <35 (A) 06/18/2024    CMVQNT Not Detected 03/05/2024    CMVQNT Not Detected 12/19/2023     Lab Results   Component Value Date    CMVLOG 1.6 07/02/2024    CMVLOG 1.6 06/25/2024    CMVLOG <1.5 06/18/2024       Urine Studies    Recent Labs   Lab Test 06/19/24  1214 01/24/21  1729   URINEPH 6.5 5.0   NITRITE Negative Negative   LEUKEST Large* Moderate*   WBCU >182* 34*       Most Recent [facility] Pulmonary Function Testing (FVC/FEV1 only)  FVC-Pre   Date Value Ref Range Status   02/14/2024 0.85 L    12/19/2023 1.04 L    11/21/2023 0.85 L    10/24/2023 0.96 L      FVC-%Pred-Pre   Date Value Ref Range Status   02/14/2024 29 %    12/19/2023 36 %    11/21/2023 29 %    10/24/2023 33 %      FEV1-Pre   Date Value Ref Range Status   02/14/2024 0.80 L    12/19/2023 0.89 L    11/21/2023 0.78 L    10/24/2023 0.87 L      FEV1-%Pred-Pre   Date Value Ref Range Status   02/14/2024 34 %    12/19/2023 38 %    11/21/2023 33 %    10/24/2023 37 %        IMAGING    Recent Results (from the past 48 hour(s))   XR Chest Port 1 View    Narrative    EXAM: XR CHEST PORT 1 VIEW  7/2/2024 3:44 PM      HISTORY: Lung tx, intubated. CT on 6/29 showed GGO    COMPARISON: CT 6/29/2024, 6/26/2024 radiograph    FINDINGS: Single view of the chest. An endotracheal tube terminates in  the midthoracic trachea. Postoperative changes in the chest and  intact  median sternotomy wires. Enteric tube terminates in the stomach and  duodenum. Right IJ central venous catheter tip terminates at the  superior cavoatrial junction. Right bronchial stent is unchanged.   Trachea is midline. Cardiac silhouette is stable. Diffuse hazy opacity  throughout the left greater than right lungs, slightly improved  compared to 6/26/2024. No pleural effusion. No pneumothorax.      Impression    IMPRESSION:   1. Endotracheal tube terminates in the midthoracic trachea. Additional  support devices are stable.  2. Hazy opacities throughout the left greater the right lungs, overall  improved compared to radiograph dated 6/26/2024.    I have personally reviewed the examination and initial interpretation  and I agree with the findings.    TERRI ISSA MD         SYSTEM ID:  [id number]

## 2024-07-03 NOTE — PROGRESS NOTES
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 Nephrology consulted for management of HD while admitted.      Interval History :   Mrs Blue had CRRT stopped yesterday, ended up 0.5L net positive but likely even to a bit negative with insensible losses on vent, wt 48.5kg (EDW 52.5kg) so suspect she is running on dry side. Had bronch today, samples sent for analysis but airways were without erythremia.  Will order run for tomorrow, intake is ~1.5L daily so will try to pull 1-2L to match/challenge EDW a bit.  Will use her fistula and consider pulling temp line if run goes well.      Assessment & Recommendations:   ESRD-ESKD: pt dialyzes MWF at [facility] (ph [phone], f [phone]) with Dr. Glasgow. Run time: 3.5 hrs. EDW 52.5 kg. Access: L AVF. +heparin 1,500u bolus.                  -Appreciate team placing tri-alysis line     -Plan for day off today, HD ordered for tomorrow, 3h/1-2L                -No need for new dialysis consent, continuing long term HD for ESRD.                -L AVF with remote hx of needing intervention, no issue recently.      Outpt Rx:       Volume-At EDW but still on vent and may have lost some muscle mass loss with acute illness.      Pulm-Admitted with hypercapnic resp failure, suspected PNA. ID involved, getting bronch.  Currently on Zosyn, bactrim, Posaconazole, Azithromycin and Vanco x1 dose so far.       Electrolytes-Stable after stopping CRRT yesterday.      BMD-No acute issues.      Anemia-Hgb ~9 and stable/improving, last PRBC's 6/26 on venofer 50mg weekly but will hold while treating for infection.  "On Mircera 60mcg [id number], will cover with short term Epo while admitted when stable enough for HD.       [facility]      Time spent: 50 minutes on this date of encounter for chart review, physical exam, medical decision making and co-ordination of care.      Seen and discussed with Dr Barbosa     Recommendations were communicated to primary team via verbal communication.     ADRIÁN Lo CNS  Clinical Nurse Specialist  [phone]    Review of Systems:   I reviewed the following systems:  ROS not done due to vent/sedation    Physical Exam:   I/O last 3 completed shifts:  In: 2134.83 [I.V.:1019.83; NG/GT:755]  Out: 828.1 [Other:678.1; Stool:150]   /44 (BP Location: Right leg)   Pulse 80   Temp 97.2  F (36.2  C) (Axillary)   Resp 22   Ht 1.6 m (5' 3\")   Wt 48.5 kg (106 lb 14.8 oz)   LMP 06/07/2014 (Exact Date)   SpO2 100%   BMI 18.94 kg/m       GENERAL APPEARANCE: Intubated but alert.    EYES: No scleral icterus  Pulmonary: lungs Rhonchi to auscultation with equal breath sounds bilaterally  CV: Regular rhythm, normal rate   - Edema none  GI: soft, nontender, normal bowel sounds  MS: no evidence of inflammation in joints, no muscle tenderness  : No Basilio  SKIN: no rash, warm, dry  NEURO: No focal deficits    Labs:   All labs reviewed by me  Electrolytes/Renal -   Recent Labs   Lab Test 07/03/24  1117 07/03/24  1115 07/03/24  0815 07/03/24  0429 07/03/24  0422 07/02/24  0409 07/02/24  0359 07/01/24  1640 07/01/24  1637   *  --   --   --  137  --  138  --  137   POTASSIUM 3.4  --   --   --  3.3*  --  3.6  --  3.8   CHLORIDE 103  --   --   --  105  --  108*  --  105   CO2 20*  --   --   --  21*  --  22  --  23   BUN 54.2*  --   --   --  47.6*  --  28.2*  --  29.2*   CR 1.84*  --   --   --  1.61*  --  0.91  --  0.95   * 121* 119*   < > 154*   < > 161*   < > 155*   CHELSEY 8.1*  --   --   --  8.0*  --  8.1*  --  7.6*   MAG  --   --   --   --  2.6*  --  2.5*  --  2.4*   PHOS  " --   --   --   --  6.1*  --  4.8*  --  4.7*    < > = values in this interval not displayed.       CBC -   Recent Labs   Lab Test 07/03/24  0422 07/02/24 0359 07/01/24  0346   WBC 6.5 6.8 7.8   HGB 8.7* 9.0* 9.0*   PLT 94* 100* 131*       LFTs -   Recent Labs   Lab Test 07/02/24  0359 07/01/24  1637 07/01/24  0346 06/30/24  1527 06/30/24  0332   ALKPHOS 176*  --  173*  --  178*   BILITOTAL 0.6  --  0.6  --  0.8   ALT 37  --  36  --  38   AST 26  --  25  --  26   PROTTOTAL 5.4*  --  5.0*  --  4.9*   ALBUMIN 2.8* 2.7* 2.6*   < > 2.6*    < > = values in this interval not displayed.       Iron Panel -   Recent Labs   Lab Test 02/03/21  0415 12/13/18  1033 08/01/18  0921   IRON 51 16* 93   IRONSAT 36 7* 37   YOLA  --  302* 571*           Current Medications:  Current Facility-Administered Medications   Medication Dose Route Frequency Provider Last Rate Last Admin    acetaminophen (TYLENOL) tablet 325 mg  325 mg Oral or Feeding Tube Q4H Jailyn Lou MD   325 mg at 07/03/24 1111    acetylcysteine (MUCOMYST) 10 % nebulizer solution 4 mL  4 mL Inhalation BID Velez Reyes, German, MD   4 mL at 07/03/24 0834    azithromycin (ZITHROMAX) tablet 250 mg  250 mg Oral or Feeding Tube Daily Velez Reyes, German, MD   250 mg at 07/03/24 0814    B and C vitamin Complex with folic acid (NEPHRONEX) liquid 5 mL  5 mL Per Feeding Tube Daily Awa Watt MD   5 mL at 07/03/24 0824    calcium carbonate (TUMS) chewable tablet 500 mg  500 mg Oral Once per day on Sunday Tuesday Thursday Saturday Josesito Pratt MD   500 mg at 07/02/24 0812    chlorhexidine (PERIDEX) 0.12 % solution 15 mL  15 mL Mouth/Throat Q12H Chio Cortez MD   15 mL at 07/03/24 0816    epoetin alisha-epbx (RETACRIT) injection 4,000 Units  4,000 Units Intravenous Once per day on Monday Wednesday Friday Velez Reyes, German, MD   4,000 Units at 07/03/24 0833    hydrocortisone sodium succinate PF (solu-CORTEF) injection 25 mg  25 mg Intravenous BID Kaila  Reyes, German, MD   25 mg at 07/03/24 0814    insulin aspart (NovoLOG) injection (RAPID ACTING)  1-12 Units Subcutaneous Q4H Edin Davis MD   1 Units at 07/03/24 0429    insulin NPH injection 8 Units  8 Units Subcutaneous BID Velez Reyes, German, MD   8 Units at 07/03/24 0818    [START ON 7/4/2024] iohexol (OMNIPAQUE) 140 MG/ML solution for oral use 50 mL  50 mL ORAL OR NJ TUBE Once Randi Méndez APRN CNP        levalbuterol (XOPENEX) neb solution 0.63 mg  0.63 mg Nebulization 4x Daily Gareth Mosquera MD   0.63 mg at 07/03/24 1120    linezolid (ZYVOX) tablet 600 mg  600 mg Oral or Feeding Tube Q12H Jailyn Lou MD   600 mg at 07/03/24 1111    [Held by provider] magnesium chloride CR tablet 1,070 mg  1,070 mg Oral Once per day on Sunday Tuesday Thursday Saturday Josesito Pratt MD        metoprolol tartrate (LOPRESSOR) tablet 25 mg  25 mg Oral BID Velez Reyes, German, MD   25 mg at 07/03/24 0814    micafungin (MYCAMINE) 150 mg in sodium chloride 0.9 % 100 mL intermittent infusion  150 mg Intravenous Q24H Chio Cortez  mL/hr at 07/02/24 2302 150 mg at 07/02/24 2302    midodrine (PROAMATINE) tablet 20 mg  20 mg Oral or Feeding Tube Q8H [facility] Rossana Sarabia APRN CNP        minocycline (MINOCIN) 100 mg in sodium chloride 0.9 % 100 mL intermittent infusion  100 mg Intravenous Q12H Jailyn Lou  mL/hr at 07/03/24 1111 100 mg at 07/03/24 1111    montelukast (SINGULAIR) tablet 10 mg  10 mg Oral At Bedtime Velez Reyes, German, MD   10 mg at 07/02/24 2149    pantoprazole (PROTONIX) 2 mg/mL suspension 40 mg  40 mg Per Feeding Tube Daily Josesito Pratt MD   40 mg at 07/03/24 1109    posaconazole (NOXAFIL) DR tablet TBEC 300 mg  300 mg Per Feeding Tube Daily Josesito Pratt MD   300 mg at 07/03/24 1111    [Held by provider] predniSONE (DELTASONE) tablet 5 mg  5 mg Oral Daily Velez Reyes, German, MD   5 mg at 06/21/24 0822    [START ON  7/4/2024] protein modular (PROSOURCE TF20) packet 1 packet  1 packet Per Feeding Tube Daily Edin Davis MD        QUEtiapine (SEROquel) tablet 50 mg  50 mg Oral or Feeding Tube At Bedtime Kajal Chong APRN CNP   50 mg at 07/02/24 2149    ramelteon (ROZEREM) tablet 8 mg  8 mg Oral At Bedtime Velez Reyes, German, MD   8 mg at 07/02/24 2257    sodium chloride (NEBUSAL) 3 % neb solution 3 mL  3 mL Nebulization BID Velez Reyes, German, MD   3 mL at 07/03/24 1120    sodium chloride (PF) 0.9% PF flush 10 mL  10 mL Intracatheter Q8H Kajal Chong APRN CNP   10 mL at 07/01/24 0956    sodium chloride (PF) 0.9% PF flush 10 mL  10 mL Intracatheter Q8H Kajal Chong APRN CNP   10 mL at 07/02/24 0014    sulfamethoxazole-trimethoprim (BACTRIM) 400-80 MG per tablet 1 tablet  1 tablet Oral or Feeding Tube Once per day on Monday Wednesday Friday Randi James MD        tacrolimus (GENERIC) suspension 0.8 mg  0.8 mg Oral or Feeding Tube BID IS Yonny Orona MD   0.8 mg at 07/03/24 0816    traZODone (DESYREL) half-tab 25 mg  25 mg Per Feeding Tube At Bedtime Jailyn Lou MD   25 mg at 07/02/24 2150    [START ON 7/5/2024] valGANciclovir (VALCYTE) solution 450 mg  450 mg Per Feeding Tube Once per day on Monday Friday Randi James MD        Vitamin D3 (CHOLECALCIFEROL) tablet 50 mcg  50 mcg Oral Once per day on Monday Wednesday Friday Velez Reyes, German, MD   50 mcg at 07/03/24 0824     Current Facility-Administered Medications   Medication Dose Route Frequency Provider Last Rate Last Admin    dexmedeTOMIDine (PRECEDEX) 4 mcg/mL in sodium chloride 0.9 % 100 mL infusion  0.1-1.2 mcg/kg/hr (Dosing Weight) Intravenous Continuous Chio Cortez MD   Stopped at 07/02/24 1030    dextrose 10% infusion   Intravenous Continuous PRN Velez Reyes, German, MD 30 mL/hr at 07/03/24 1300 Rate Verify at 07/03/24 1300    dextrose 10% infusion   Intravenous Continuous PRN Awa Watt MD         phenylephrine (DANDY-SYNEPHRINE) 50 mg in NaCl 0.9 % 250 mL infusion  0.1-6 mcg/kg/min (Dosing Weight) Intravenous Continuous Chio Cortez MD   Stopped at 07/03/24 0840

## 2024-07-03 NOTE — ANESTHESIA CARE TRANSFER NOTE
Patient: Kecia Blue    Procedure: Procedure(s):  Tracheostomy       Diagnosis: Hypoxic respiratory failure (H) [J96.91]  Diagnosis Additional Information: No value filed.    Anesthesia Type:   General     Note:    Oropharynx: ventilatory support, endotracheal tube in place and nasal gatric tube in place  Level of Consciousness: iatrogenic sedation  Patient oxygen source: Ventilated.    Independent Airway: airway patency satisfactory and stable  Dentition: dentition unchanged  Vital Signs Stable: post-procedure vital signs reviewed and stable  Report to RN Given: handoff report given  Patient transferred to: ICU    ICU Handoff: Call for PAUSE to initiate/utilize ICU HANDOFF, Identified Patient, Identified Responsible Provider, Reviewed the Pertinent Medical History, Discussed Surgical Course, Reviewed Intra-OP Anesthesia Management and Issues during Anesthesia, Set Expectations for Post Procedure Period and Allowed Opportunity for Questions and Acknowledgement of Understanding      Vitals:  Vitals Value Taken Time   BP     Temp     Pulse 81 07/03/24 1516   Resp     SpO2 100 % 07/03/24 1516   Vitals shown include unfiled device data.    Electronically Signed By: Shree Camarena MD  July 3, 2024  3:17 PM

## 2024-07-03 NOTE — PROGRESS NOTES
"Bronchoscopy Risk Assessment Guidelines      A. Patient symptoms to consider when assessing pulmonary TB risk are:    I. Cough greater than 3 weeks; and fever, hemoptysis, pleuritic chest    pain, weight loss greater than 10 lbs, night sweats, fatigue, infiltrates on    upper lobes or superior segments of lower lobes, cavitation on chest    x-ray.   B. Patient risk factors to consider when assessing pulmonary TB risk are:    I. Exposure to known TB case, foreign-born persons (within 5 years of    arrival to [place]), residence in a crowded setting (correctional facility,     long-term care center, etc.), persons with HIV or immunosuppression.    Patients with symptoms and risk factors should generally be considered \"suspect risk\" and bronchoscopies should be performed in airborne precautions.    This patient has NO KNOWN RISK of Tuberculosis (proceed with bronchoscopy)    Specimens sent: yes  Complications: None  Scope used: #[id number] Adult  Attending Physician: Randi James MD Abdissa Ambrass, RT on 7/3/2024 at 11:28 AM  "

## 2024-07-03 NOTE — CONSULTS
"    Interventional Radiology  [facility] Consult Service Note  07/03/24   10:39 AM    Consult Requested: \"evaluation for placement of PEG-J tube\"    Recommendations/Plan:    Patient is on IR schedule 7/5 for a GJ tube placement.   Labs WNL for procedure.  Orders entered for procedure, NPO status, contrast, and pre procedure IV antibiotics.   Consent will be done prior to procedure.     Please contact the IR charge RN at [phone] for estimated time of procedure.     Case and imaging discussed with IR attending, Dr. Sanchez.  Recommendations were reviewed with Dr. Davis.    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 Hospital course complicated by hypotension, delirium, and prolonged respiratory failure. Pt will reportedly requires trach placement and GJ tube placement (long term enteral access). IR is consulted for GJ tube placement.    Any GJ tube placed by IR must remain in x6 weeks before it can be removed even if no longer needed. Oral contrast will be ordered and sent to the bedside for administration via NJ 7/4 at 2000.    Pertinent Imaging Reviewed:         Expected date of discharge:  TBD    Vitals:   /44 (BP Location: Right leg)   Pulse 91   Temp 97.5  F (36.4  C) (Axillary)   Resp 22   Ht 1.6 m (5' 3\")   Wt 48.5 kg (106 lb 14.8 oz)   LMP 06/07/2014 (Exact Date)   SpO2 92%   BMI 18.94 kg/m      Pertinent Labs:   Lab Results   Component Value Date    WBC 6.5 07/03/2024    WBC 6.8 07/02/2024    WBC 7.8 07/01/2024    WBC 7.5 05/02/2021    WBC 6.8 04/30/2021    WBC 6.3 04/29/2021 "     Lab Results   Component Value Date    HGB 8.7 07/03/2024    HGB 9.0 07/02/2024    HGB 9.0 07/01/2024    HGB 10.6 05/02/2021    HGB 10.9 04/30/2021    HGB 11.1 04/29/2021     Lab Results   Component Value Date    PLT 94 07/03/2024     07/02/2024     07/01/2024     05/02/2021     04/30/2021     04/29/2021     Lab Results   Component Value Date    INR 0.98 06/18/2024    INR 0.9 08/16/2023    INR 0.9 08/16/2023    INR 0.99 04/29/2021    PTT 28 02/12/2021     Lab Results   Component Value Date    POTASSIUM 3.3 (L) 07/03/2024    POTASSIUM 3.8 10/17/2023    POTASSIUM 3.5 05/26/2022    POTASSIUM 4.3 05/07/2021        COVID-19 Antibody Results, Testing for Immunity           No data to display              COVID-19 PCR Results          9/26/2022    09:56 10/12/2022    15:28 7/11/2023    11:32 6/17/2024    13:37 6/18/2024    14:33 6/18/2024    17:29   COVID-19 PCR Results   COVID-19 Virus by PCR (External Result) Negative     Negative      Negative        Negative        Undetected         SARS CoV2 PCR   Negative   Negative  Negative       Details          This result is from an external source.               ADRIÁN Oneill CNP  Interventional Radiology  Pager: [phone]

## 2024-07-03 NOTE — PROCEDURES
Procedure:   Bronchoscopy         Indication:   Acute on chronic hypoxic respiratory failure  Lung transplant  Immune suppressed status            Consent:   Obtained from the .   Risks, benefits and alternatives discussed. Risks include bleeding, infection, respiratory failure, vocal cord trauma/paralysis and pneumothorax.  In general, severe complications and death with bronchoscopy described as 0.6% and 0.013%, respectively.  Additional risks are related to conscious sedation/general anesthesia involve bradycardia, arrhythmia, hypotension.  The patient/ is in agreement to proceed with the procedure.       Pre-medication:   The patient is on mechanical ventilation but not on any sedation.  Lidocaine 1%: through the ETT   Bolus medications:   Versed 0.5 mg, Fentanyl 25 mcg        Procedure Summary:   Time out was performed.   The bronchoscope inserted through the ETT.    Airway examination:  Upper airway structures, vocal cords (anatomy and function) appear to be normal. Exam of trachea and bronchus of the right and left bronchial tree to the sub-segmental level revealed no endobronchial lesion. Stent remains in place in the right mainstem bronchus.  The anastamosis was visualized on the left side and was free of secretions and widely patent.  The left lung was inspected and the airways were unremarkable.    Procedure:  BAL performed in the lingula.  A total of 130 cc saline instilled with return of 37 ml of straw colored fluid.  Studies were sent for analysis.    The patient tolerated the procedure well without undue discomfort, hypotension or arrhythmia. The procedure was performed in the ICU and vital sign parameters were monitored.        Complications:   No immediate complications.

## 2024-07-03 NOTE — PROGRESS NOTES
Care Management Follow Up    Length of Stay (days): 15    Expected Discharge Date:  unknown     Concerns to be Addressed: all concerns addressed in this encounter  discharge plans are unknown at this time and will update pt/family once IDT recommentations are made  Patient plan of care discussed at interdisciplinary rounds: No    Anticipated Discharge Disposition:  potential LTACH     Anticipated Discharge Services: none   Anticipated Discharge DME:  none    Patient/family educated on Medicare website which has current facility and service quality ratings:    Education Provided on the Discharge Plan:    Patient/Family in Agreement with the Plan:      Referrals Placed by CM/SW:  none at this time  Private pay costs discussed: Not applicable    Additional Information:  Patient and spouse well known to me from follow up in the lung transplant program.  Lengthy visit with spouse today.  Reviewed course of patient illness, plan for the trach.  Discussed how long term trach could effect out patient dialysis.  (May or may not be accepted at her home dialysis unit)  Ranjan comfortable with proceeding, as is pt.  He remains hopeful for patient recovery, but knows she has a long road ahead of her if that is possible.  Is contemplating taking an short term rental apartment in [place].  Discussed options.  Supportive counseling provided.  Reviewed patient accomplishments in 6 years since transplant.  He feels delmy to have her life extended as it has been.      Redd Ruiz, [facility]  Lung Transplant   Phone: [phone]     [place]

## 2024-07-03 NOTE — OP NOTE
Otolaryngology Operative Report   Date of Operation: July 3, 2024  Patient Name: Kecia Blue  Patient : 1960   Patient MRN: [id number]    Staff Surgeon: Cris Liu MD  Resident Surgeon: James Argueta MD & Marleny Singh MD  Preoperative Diagnosis:   1. Respiratory failure  Postoperative Diagnosis: Same  Procedure: Creation tracheostomy  Anesthesia: General  Findings: A 6-0 Shiley was successfully placed via lian between rings 2-3.   EBL: 2 cc  Complications: None  Specimens: None    Clinical Indications: Kecia Blue is a 63 year old with history of respiratory failure and was recommended to undergo aforementioned procedure. All risks and benefits were discussed with the consenting party and they elected to proceed with the procedure.  Description of Procedure: After consent, the patient was brought to the operating room and placed in the supine position.  Monitoring lines were placed as appropriate.  A time-out was performed to confirm the identity of this patient.  At this time, anterior neck landmarks were palpated and marked using a skin-marking pen, approximately 5- mL of 1% with 1:100,000 epinephrine was injected. While awaiting anesthetic and hemostatic effect, the patient was prepped in the usual sterile fashion.      We began by making a horizontal skin incision using a 15-blade.  A combination of sharp and blunt dissection was used to identify and subsequently divide the midline raphe of the strap musculature. The straps were reflected laterally to expose the thyroid isthmus.  With careful dissection, the isthmus was divided using monopolar cautery. We then exposed the anterior tracheal wall.  We then discussed the subsequent steps with our anesthesia colleagues, the FiO2 was decreased to room air and the ETT was advanced slightly to avoid puncturing the cuff with the tracheal incision. After ensuring the tracheostomy tube had been tested, we created a lian flap and secured the  flap to the skin using a 3-0 vicryl.  The endotracheal tube was then slowly backed out of the airway under direct visualization and a 6-0 Shiley tube was inserted into the tracheostomy without difficulty; the cuff was inflated.  Ventilations were resumed immediately without incident.  Once we confirmed he was able to adequately move air through the tracheotomy tube, the indwelling endotracheal tube was removed.  The tracheotomy tube was secured using 2-0 prolene sutures and hemostasis was acheived.  This concluded the procedure and the patient was handed back over to the care of the Anesthesia team.  The patient tolerated the procedure well and no complications were encountered.    Dr. Liu was present for the duration of the procedure.

## 2024-07-03 NOTE — PLAN OF CARE
ICU End of Shift Summary. See flowsheets for vital signs and detailed assessment.    Changes this shift: Pt alert, oriented to name/place. RASS 0 to -1. Denies hallucinations. HR SR w/ occasional PACs. Afebrile. ETT in place at start of shift. Bedside bronch for additional testing per pulm transplant team. ENT performed tracheostomy and now on the same vent settings via trach. Palliative consult - was not able to see today. Restarted TF at 30mL/hr. Stopped D10. Phenyl stopped this AM, MAP >65. No BM this shift. Anuric. Dusky skin, weak pulses. Encourage repositioning.     Plan: IR plan for 7/5 PEG-J placement. Contrast in room to be given tomorrow evening. Monitor VS. SBT/PST as tolerated per order. HD tomorrow per nephrology. Repositioning as tolerated.

## 2024-07-03 NOTE — PLAN OF CARE
ICU End of Shift Summary. See flowsheets for vital signs and detailed assessment.    Changes this shift: Oriented, RASS 0 to -1. Follows commands. Denies visual hallucinations today. Delirium precautions followed. Atarax given x1 for anxiety. HR SR in 70-90s. Titrating phenly to maintain MAP >65. Afebrile. NPO @0000 for trach procedure in AM. D10 initiated @30ml/hr. Anuric, off CRRT. Dusky skin with weak pulses.  Lg loose BM x3. LUE AVF w/bruit and thrill.     Plan: Trach placement @1330. IR consult for Peg-j placement. Hold off on HD unitl 7/4        Goal Outcome Evaluation:      Plan of Care Reviewed With: patient, spouse    Overall Patient Progress: improvingOverall Patient Progress: improving    Outcome Evaluation: NPO @0000 for Trach and peg placement today.

## 2024-07-03 NOTE — PROGRESS NOTES
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  Status post airway stent placement by IP on 6/20. Hospital course complicated by hypotension, delirium, and prolonged respiratory failure.    Changes today  - Bronchoscopy today for bronchial washings form viral cultures per pulm transplant team.  - Palliative Care consulted per pulm transplant  - Patient to go to OR for trach with ENT at 1330  - IR radiology scheduled patient for PEG/J on 7/5. IR radiology will order contrast to be given tomorrow night.  - Increased midodrine to 20 mg TID to assist in weaning pressors  - Nephrology to assess patient for HD needs today with plan to restart iHD tomorrow  - Continue BID SBT trials on pressure support 10/5 as tolerated      PLAN:    Neuro:  # Pain   - Acetaminophen 325mg q4H  - IV fentanyl prn     # Sedation  - RAAS goal 0 to -1, currently -1  - Precedex gtt - wean as able, off since overnight  - Daily sedation vacation    # Toxic metabolic encephalopathy, improving  Presented with 2-3 days of lethargy, decreased appetite, and fatigue. Previously has improved with BiPAP/intubation. Ongoing lethargy in setting of sepsis and delirium in the setting of high-dose steroid therapy. No hallucinations overnight but sleep cycle remains flipped. NPI normal per nursing.   - Quetiapine 50 mg at bedtime   - Continue hydroxyzine prn for anxiety   - Ramalteon and trazodone 25mg for sleep   - Weaning stress dose steroids as tolerated  - NPI q4h for 3-5 days  to determine if periods of decreased arousal related to sedation medications, normal  - Continue to attempt to keep patient awake during day to normalize sleep cycle    Pulmonary:  # Acute on chronic hypoxic hypercapnic respiratory failure  # HAP, Stenotrophomonas maltophilia, Enterococcus faecalis, and Candida guilliermondi complex  # Severe pulmonary edema  # Acute respiratory distress syndrome, resolving  Presented to the ED on 6/18/2024 with acute on chronic hypoxic hypercapnic respiratory failure. Transferred to MICU and intubated on overnight on 6/18. Workup significant for Stenotrophomonas, Enterococcus, and Candida pneumonia. Course was c/b progression to ARDS (6/21-22) with elevated plateau pressures. Pt remain intubated d/t ongoing pulmonary edema iso ESRD on iHD. CXR (6/26) with similar pulmonary opacities compared to 6/24, left > right.  The etiology of this groundglass opacity was unclear but ddx includes rejection vs. infection vs. volume overload.   - Antibiotics, as below  - Volume management with CRRT, as below  - Mechanical ventilation, wean as able, meets extubation readiness  - Daily SBT (AM and PM) on pressure support 10/5  - Patient to have trach today with ENT at 1330 for prolonged respiratory failure  - Pulmonary toilet  - Mucomyst BID  - Hypertonic saline BID, alternate with mucomyst  - Albuterol neb QID  - Did not tolerate volera  - Repeat bronch this morning per pulm transplant team, for BAL washings to send for viral cultures to evaluate evolution of GGOs on serial imaging during this admission  - Continue BID SBT 10/5 as tolerated    Vent Mode: CMV/AC  (Continuous Mandatory Ventilation/ Assist Control)  FiO2 (%): 40 %  Resp Rate (Set): 18 breaths/min  Tidal Volume (Set, mL): 320 mL  PEEP (cm H2O): 5 cmH2O  Pressure Support (cm H2O): 12 cmH2O  Resp: 23      # Recurrent right middle bronchus stenosis s/p dilation and stent (6/20/2024)  Has history (bronchoscopy 2/15/24) demonstrating  narrowing of right mainstem transplant anastomosis and bronchus intermedius. CT chest (6/18/2024) and bronchoscopy (6/19/2024) demonstrated occlusion of right middle bronchus. With concern for post-obstructive pneumonia, interventional pulmonology was consulted, and completed bronchoscopy (6/20/2024) with tissue debulking, right middle bronchus balloon dilation to 11 mm, stent placement in right main bronchus, and bifurcating cast placement in right upper bronchus. Plan for saline nebs BID and outpatient bronchoscopy in 6 weeks.   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  - DSA repeat on 7/3/24  - Tacrolimus level (6/27) was 4.5 (low)  - PTA nebs  - Fluticasone-viilanterol (breo ellipta) every day - HOLD with respiratory infection  - Montelukast every day   - Immunosuppressants per Tx Pulm:   - PTA Tacrolimus (dosing per tx pulm)  - PTA Prednisone 5 mg daily - HOLD with stress dose steroids  - PTA azathioprine - HOLD per Transplant pulmonology with repeat infections      Cardiovascular:  # Septic shock, persistent   On 6/19/2024, developed sepsis (hypotension 80s/50s, tachypnea 24) in the setting of stenotrophomonas pneumonia/enterococcus faecium VRE UTI. Etiology of shock likely distributive iso sepsis; less likely hypovolemic (not pulling volumes with CRRT), medication-associated (weaned off of propofol gtt) or obstructive (low suspicion for PE, echo 6/19 without evidence of cardiac dysfunction).   Pressor requirements slowly improving but are sluggish, driven by low diastolic pressure. Patient on  and off small amount of phenylephrine over past several days.  - IV pressors, wean as able   - Phenylephrine gtt in setting of blood pressure support from midodrine  - MAP goal >65  - Stress dose steroids  - Hydrocortisone 50mg q6H (6/21-6/26); 50 mg BID (6/26-6/28); 25 mg BID (6/28-x)  - Fludrocortisone 0.1 mg qday (6/21-6/28); 0.05 mg qday (6/28-30)  - If hemodynamically stable, consider transitioning to PTA prednisone at increased 40 mg daily (then titrate to 5 mg)  - Increase midodrine to 20 mg TID to support pressor weaning.     # Demand Ischemia, resolved    Presented with elevated troponin (peak 499). Not associated with chest pain or hypoxia. EKG without ST elevations/depressions or T wave inversions. Suspect demand ischemia in the setting of sepsis. Will continue to monitor symptoms and continuous telemetry.   -EKG 7/2 sinus rhythm with PACs     # Paroxysmal A-Fib, improved  # Pulmonary Hypertension   History of pulmonary hypertension (45 mmHg, echo 10/2023) in the setting of ILD and paroxysmal afib on PTA metoprolol tartrate BID. Not on anticoagulation for afib.   - PTA metoprolol tartrate 25mg BID     GI/Nutrition:  # Nutrition  NJT placed (6/19/2024).   - Nutrition consult  - Tube feeds 30ml/hr; at goal  - Currently NPO since midnight for OR for trach today    # Diarrhea, resolved  On 6/21, had 8 bowel movements. Occurred in the setting of scheduled bowel regimen and starting new antibiotics (meropenem, levofloxacin). C diff negative.   - Bowel regimen  - Miralax daily PRN   - Senna BID    # C/f cholecystitis vs choledocholithiasis/distal biliary obstruction  # Cholelithiasis with gallbladder wall thickening  # Elevated bili  # Elevated alk phos, resolved  Admission with elevated alkaline phosphatase (237) with elevated GGT consistent with hepatic etiology, resolved. On 6/27, pt endorsed RUQ pain with palpation and had elevated body temps (99-100F) on CRRT. LFTs and bili (6/27) was also noted have been  trending upwards over the past few days. Labs on 6/28 improved, but RUQ U/S (6/28) showed gallbladder wall thickening measuring up to 6 mm with shadowing gallstones present and pericholecystic fluid; common bile duct dilated to 11 mm. Findings c/w cholelithiasis with c/f cholecystitis vs choledocholithiasis/distal biliary obstruction. Lipase negative   - T.Bili 0.8- low concern for obstruction  - Consult GI, appreciate recs  - MRCP completed showing borderline dilated CBD up to 1.1 cm, no significant intrahepatic biliary dilation, no evidence of choledocholithiasis, no evidence of acute cholecystitis.    Renal/Fluids/Electrolytes:  # ESRD on iHD (M,W,F)  History of ESRD 2/2 Tacrolimus toxicity on HD (MWF). With soft blood pressures, placed RIJ (6/20/2024) and started CRRT (6/20/2024).  - Nephrology consulted and following for ESRD   - Currently of CRRT   - Goal net negative 500ml (+519 ml yesterday)  - Nephrology to reasses volume status and HD needs today with current plan to restart iHD tomorrow, 7/4.     - Renally-dosed medications  - K 3.3 this morning, IV 10 ml KCL replacement  -BMP this afternoon to reassess.      Endocrine:  # Risk for hyperglycemia with stress-dose steroids  Given high BG in high 200s on stress dose steroids and fludrocortisone, started on insulin gtt on 6/25. Overnight on 6/26, BG dropped to 77, pt given IV bolus of D50W and sugars corrected. Hypoglycemic episode likely iso titrating down the stress dose steroids. S/p insulin gtt (6/25-27).   - NPH 8U BID  -CTM     ID:  # HAP, Stenotrophomonas maltophilia, Enterococcus faecalis, Aspergillus, and Candida guilliermondi complex  Presented to the ED on 6/18/2024 with SOB and hypercapnic respiratory failure. Found on bronchoscopy (6/19) to have RML obstruction by narrowing bronchus intermedius as well as copious mucopurulent secretions/mucus plugging. Previously treated for Stenotrophonomas/aspergillus pneumonia in 11/2023 with subsequent sputum  culture (2/2024) negative for both Stenotrophonomas/Aspergillus. Etiology likely repeat Stenotrophomonas infection vs opportunistic infection from colonized microbe.   - Workup  - 6/18 sputum cx: Stenotrophomonas maltophilia (ceftazidime and levofloxacin R)  - 6/19 BAL cx: Stenotrophomonas maltophilia, Enterococcus faecalis (gentamicin R), Aspergillus (speciation in process)  - 6/21 BAL cx: Stenotrophomonas maltophilia and Enterococcus faecalis VRE  - 6/24 sputum cx: Stenotrophomonas maltophilia, Enterococcus faecalis VRE, and Candida guilliermondi complex  - Transplant pulmonology consult, appreciate recs  - Transplant ID consult, appreciate recs  - Present antibiotics  - IV minocycline 100mg BD x 14 days total (through 7/5) (6/20-x)- for stenotrophamonas  - PO linezolid 600 mg BID x 10 days (through 7/5) (6/25-x)- for VRE in urine and BAL cultures  - IV micafungin 150 mg daily and posaconozale 300 mg daily (through 7/5) (6/25-x)  for candida guillermondii and asergillus ochraceus on BAL  - Past antibiotics  - S/p ceftazidime (6/25-6/28)    - S/p vancomycin (6/18-6/20)   - S/p zosyn (6/18-21)  - S/p Daptomycin (6/21-6/23)  - S/p Meropenem (6/21-6/24)  - S/p Levofloxacin (6/21-6/23)    # Pyuria, Enterococcus faecium VRE and  ESBL E. Coli   Asymptomatic. With progressive IV pressor needs, obtained broad infectious workup which demonstrated UCx (6/19/2024) with Enterococcus faecium VR and ESBL E. Coli. S/p Daptomycin (6/21-6/23) and Meropenem (6/21-6/24).    # Hx Aspergillus PNA (11/2023)  # Hx PJP PNA (1/2021)  # Hx CMV viremia, recurrent  # Hx EBV viremia  - Transplant ID consult, appreciate recs  PTA posaconazole (Treatment for hx of aspergillus PNA)  PTA azithromycin 250mg qd (CLAD ppx)  PTA bactrim (PJP ppx)     Hematology:    # Acute on Chronic Normocytic Anemia, stable  # Thrombocytopenia, chronic  History of chronic anemia in the setting of kidney disease (baseline Hgb 10-11). Upon admission, Hgb 9. May be  bone marrow suppression in the setting of chronic illness; potential contribution by blood loss with CRRT filter changes (~150-200c). Peripheral smear (6/18/2024) without evidence of schistocytes.  - Hb stable  - Continue to monitor CBC    Musculoskeletal:  - PT / OT consult     Skin:  - No acute concerns.     General Cares/Prophylaxis:    DVT Prophylaxis: Heparin SQ  GI Prophylaxis: PPI  Restraints: None  Family Communication: , will update over phone or in person  Code Status: Full Code     Lines/tubes/drains:  - PIV x2, midline  - RIJ  - A line  - NGT  - ETT    Disposition:  - Medical ICU   - Palliate Consult placed per pulm transplant team recs.    Patient seen and findings/plan discussed with medical ICU staff, Dr. James.    Edin Davis MD  Internal Medicine Resident, PGY-1      Clinically Significant Risk Factors        # Hypokalemia: Lowest K = 3.3 mmol/L in last 2 days, will replace as needed   # Hypocalcemia: Lowest iCa = 4.3 mg/dL in last 2 days, will monitor and replace as appropriate     # Hypoalbuminemia: Lowest albumin = 2.2 g/dL at 6/21/2024  4:26 PM, will monitor as appropriate   # Thrombocytopenia: Lowest platelets = 94 in last 2 days, will monitor for bleeding                 #Precipitous drop in Hgb/Hct: Lowest Hgb this hospitalization: 6.9 g/dL. Will continue to monitor and treat/transfuse as appropriate.      # Moderate Malnutrition: based on nutrition assessment    # Financial/Environmental Concerns: none                  ====================================  INTERVAL HISTORY:   Patient continues to go off and on phenylephrine overnight and this morning. S/p bronch yesterday for stent evaluation. Patient with sleep cycle still flipped, not sleeping at night. No further hallucinations and improved mental status. Pulm transplant discussed palliative consult with patient and , consult placed. Patient not on CRRT or HD since yesterday. Going for trach today and PEG/J on  7/5 with IR radiology. No acute events overnight. No complaints of pain this morning.   at bedside and updated.       OBJECTIVE:   1. VITAL SIGNS:   Temp:  [97  F (36.1  C)-98.3  F (36.8  C)] 97.2  F (36.2  C)  Pulse:  [64-96] 75  Resp:  [18-36] 23  BP: (104)/(44) 104/44  MAP:  [56 mmHg-95 mmHg] 73 mmHg  Arterial Line BP: ()/(39-76) 119/47  FiO2 (%):  [40 %-50 %] 40 %  SpO2:  [88 %-100 %] 93 %  Vent Mode: CMV/AC  (Continuous Mandatory Ventilation/ Assist Control)  FiO2 (%): 40 %  Resp Rate (Set): 18 breaths/min  Tidal Volume (Set, mL): 320 mL  PEEP (cm H2O): 5 cmH2O  Pressure Support (cm H2O): 12 cmH2O  Resp: 23    2. INTAKE/ OUTPUT:   I/O last 3 completed shifts:  In: 2134.83 [I.V.:1019.83; NG/GT:755]  Out: 828.1 [Other:678.1; Stool:150]    3. PHYSICAL EXAMINATION:  General: Sitting in b/s chair, NAD   HEENT: Atraumatic, moist mucous membranes   Pulm/Resp: Ongoing coarse breath sounds. Mild expiratory wheezing. Good air movement throughout.   CV: RRR, no m/r/g   Abdomen: Soft, non-distended, tender to palpation all quadrants.   Ext: Trace bilateral LE edema, pulses 2+ radial, pedal  Incisions/Skin: No rashes or lesions  Neuro: sleeping, follows 1-step commands and nods and shakes head to questions, moving all extremities    4. LABS:   Arterial Blood Gases   Recent Labs   Lab 06/30/24  1537   PH 7.38   PCO2 41   PO2 154*   HCO3 24     Complete Blood Count   Recent Labs   Lab 07/03/24  0422 07/02/24  0359 07/01/24  0346 06/30/24  0332   WBC 6.5 6.8 7.8 6.3   HGB 8.7* 9.0* 9.0* 8.9*   PLT 94* 100* 131* 126*     Basic Metabolic Panel  Recent Labs   Lab 07/03/24  0429 07/03/24  0422 07/03/24  0019 07/02/24  1958 07/02/24  0409 07/02/24  0359 07/01/24  1640 07/01/24  1637 07/01/24  0353 07/01/24  0346   NA  --  137  --   --   --  138  --  137  --  138   POTASSIUM  --  3.3*  --   --   --  3.6  --  3.8  --  3.6   CHLORIDE  --  105  --   --   --  108*  --  105  --  107   CO2  --  21*  --   --   --  22  --  23   --  22   BUN  --  47.6*  --   --   --  28.2*  --  29.2*  --  28.1*   CR  --  1.61*  --   --   --  0.91  --  0.95  --  0.89   * 154* 160* 124*   < > 161*   < > 155*   < > 170*    < > = values in this interval not displayed.     Liver Function Tests  Recent Labs   Lab 07/02/24  0359 07/01/24  1637 07/01/24  0346 06/30/24  1527 06/30/24  0332 06/29/24  1604 06/29/24  0359   AST 26  --  25  --  26  --  22   ALT 37  --  36  --  38  --  36   ALKPHOS 176*  --  173*  --  178*  --  146   BILITOTAL 0.6  --  0.6  --  0.8  --  0.8   ALBUMIN 2.8* 2.7* 2.6* 2.7* 2.6*   < > 2.5*    < > = values in this interval not displayed.     Coagulation Profile  No lab results found in last 7 days.      5. RADIOLOGY:   Recent Results (from the past 24 hour(s))   XR Chest Port 1 View    Narrative    EXAM: XR CHEST PORT 1 VIEW  7/2/2024 3:44 PM      HISTORY: Lung tx, intubated. CT on 6/29 showed GGO    COMPARISON: CT 6/29/2024, 6/26/2024 radiograph    FINDINGS: Single view of the chest. An endotracheal tube terminates in  the midthoracic trachea. Postoperative changes in the chest and intact  median sternotomy wires. Enteric tube terminates in the stomach and  duodenum. Right IJ central venous catheter tip terminates at the  superior cavoatrial junction. Right bronchial stent is unchanged.   Trachea is midline. Cardiac silhouette is stable. Diffuse hazy opacity  throughout the left greater than right lungs, slightly improved  compared to 6/26/2024. No pleural effusion. No pneumothorax.      Impression    IMPRESSION:   1. Endotracheal tube terminates in the midthoracic trachea. Additional  support devices are stable.  2. Hazy opacities throughout the left greater the right lungs, overall  improved compared to radiograph dated 6/26/2024.    I have personally reviewed the examination and initial interpretation  and I agree with the findings.    TERRI ISSA MD         SYSTEM ID:  [id number]

## 2024-07-04 NOTE — PROGRESS NOTES
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  Status post airway stent placement by IP on 6/20. Hospital course complicated by hypotension, delirium, and prolonged respiratory failure.      Changes today  - S/p tracheostomy last night  - Scheduled for PEG/J tomorrow with IR radiology, NPO at midnight, will give D10  - Palliative care consulted and following  - BAL fluid cultures from bronch washings yesterday pending  - Additional dose of midodrine 15mg x 1 for MAP 67 and soft SBP prior to starting HD.  - Continue BID SBT trials on pressure support 10/5 as tolerated  - Wean stress dose steroids to hydrocortisone 25 mg for 2 days and then taper to PTA prednisone 5 mg daily    PLAN:    Neuro:  # Pain   - Acetaminophen 325mg q4H  - IV fentanyl prn     # Sedation  - RAAS goal 0 to -1, currently at goal  - Precedex gtt - wean as able, turned off since 7/2 am  - Daily sedation vacation    # Toxic metabolic encephalopathy, improving  Presented with 2-3 days of lethargy, decreased appetite, and fatigue. Previously has improved with BiPAP/intubation. Ongoing lethargy in setting of sepsis and delirium in the setting of high-dose steroid therapy. No hallucinations overnight but sleep cycle remains flipped. Following commands this morning. Patient slept last night on current regimen.  - Quetiapine 50 mg at bedtime   - Continue hydroxyzine prn for anxiety   - Ramalteon and trazodone 25mg for sleep   - Weaning stress dose steroids as tolerated  -  NPI q4h for 3-5 days to determine if periods of decreased arousal related to sedation medications, normal  - Continue to attempt to keep patient awake during day to normalize sleep cycle    Pulmonary:  # Acute on chronic hypoxic hypercapnic respiratory failure  # HAP, Stenotrophomonas maltophilia, Enterococcus faecalis, and Candida guilliermondi complex  # Severe pulmonary edema  # Acute respiratory distress syndrome, resolving  Presented to the ED on 6/18/2024 with acute on chronic hypoxic hypercapnic respiratory failure. Transferred to MICU and intubated on overnight on 6/18. Workup significant for Stenotrophomonas, Enterococcus, and Candida pneumonia. Course was c/b progression to ARDS (6/21-22) with elevated plateau pressures. Pt remain intubated d/t ongoing pulmonary edema iso ESRD on iHD. CXR (6/26) with similar pulmonary opacities compared to 6/24, left > right.  The etiology of this groundglass opacity was unclear but ddx includes rejection vs. infection vs. volume overload.   - Antibiotics, as below  - Volume management with CRRT, as below  - Mechanical ventilation, wean as able, meets extubation readiness  - Daily SBT (AM and PM) on pressure support 10/5  - Patient to have trach today with ENT at 1330 for prolonged respiratory failure  - Pulmonary toilet  - Mucomyst BID  - Hypertonic saline BID, alternate with mucomyst  - Albuterol neb QID  - Did not tolerate volera  - Repeat bronch this morning per pulm transplant team, for BAL washings to send for viral cultures to evaluate evolution of GGOs on serial imaging during this admission  - Continue BID SBT 10/5 as tolerated    Vent Mode: CMV/AC  (Continuous Mandatory Ventilation/ Assist Control)  FiO2 (%): 45 %  Resp Rate (Set): 18 breaths/min  Tidal Volume (Set, mL): 320 mL  PEEP (cm H2O): 5 cmH2O  Pressure Support (cm H2O): 12 cmH2O  Resp: 24  PIP 23  Plat 21.1    # Recurrent right middle bronchus stenosis s/p dilation and stent (6/20/2024)  Has history  (bronchoscopy 2/15/24) demonstrating narrowing of right mainstem transplant anastomosis and bronchus intermedius. CT chest (6/18/2024) and bronchoscopy (6/19/2024) demonstrated occlusion of right middle bronchus. With concern for post-obstructive pneumonia, interventional pulmonology was consulted, and completed bronchoscopy (6/20/2024) with tissue debulking, right middle bronchus balloon dilation to 11 mm, stent placement in right main bronchus, and bifurcating cast placement in right upper bronchus. Plan for saline nebs BID and outpatient bronchoscopy in 6 weeks.   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  - DSA repeat on 7/3/24  - Tacrolimus level (6/27) was 4.5 (low)  - PTA nebs  - Fluticasone-viilanterol (breo ellipta) every day - HOLD with respiratory infection  - Montelukast every day   - Immunosuppressants per Tx Pulm:   - PTA Tacrolimus (dosing per tx pulm)  - PTA Prednisone 5 mg daily - HOLD with stress dose steroids  - PTA azathioprine - HOLD per Transplant pulmonology with repeat infections      Cardiovascular:  # Septic shock, persistent   On 6/19/2024, developed sepsis (hypotension 80s/50s, tachypnea 24) in the setting of stenotrophomonas pneumonia/enterococcus faecium VRE UTI. Etiology of shock likely distributive iso sepsis; less likely hypovolemic (not pulling volumes with CRRT), medication-associated (weaned off of propofol gtt) or obstructive (low suspicion for PE, echo 6/19 without evidence of cardiac dysfunction).   Pressor requirements slowly improving but are sluggish, driven by  low diastolic pressure. Patient on and off small amount of phenylephrine over past several days.  - IV pressors, wean as able   - Phenylephrine gtt in setting of blood pressure support from midodrine  - MAP goal >65  - Stress dose steroids   Current: Wean stress dose steroids to hydrocortisone 25 mg for 2 days and then taper to PTA prednisone 5 mg daily   Past course:  - Hydrocortisone 50mg q6H (6/21-6/26); 50 mg BID (6/26-6/28); 25 mg BID (6/28-7/3)  - Fludrocortisone 0.1 mg qday (6/21-6/28); 0.05 mg qday (6/28-30)  - If hemodynamically stable, consider transitioning to PTA prednisone at increased 40 mg daily (then titrate to 5 mg)  - Increase midodrine to 20 mg TID to support pressor weaning.     # Demand Ischemia, resolved    Presented with elevated troponin (peak 499). Not associated with chest pain or hypoxia. EKG without ST elevations/depressions or T wave inversions. Suspect demand ischemia in the setting of sepsis. Will continue to monitor symptoms and continuous telemetry.   -EKG 7/2 sinus rhythm with PACs     # Paroxysmal A-Fib, improved  # Pulmonary Hypertension   History of pulmonary hypertension (45 mmHg, echo 10/2023) in the setting of ILD and paroxysmal afib on PTA metoprolol tartrate BID. Not on anticoagulation for afib.   - PTA metoprolol tartrate 25mg BID     GI/Nutrition:  # Nutrition  NJT placed (6/19/2024).   - Nutrition consult  - Tube feeds 30ml/hr; at goal  - Currently NPO since midnight for PEGJ tomorrow, D10 tonight    #Nausea  Reports nausea overnight after trach procedure and again this morning after medications given  -Zofran 4 mg po q4 hours prn    # Diarrhea,   On 6/21, had 8 bowel movements. Occurred in the setting of scheduled bowel regimen and starting new antibiotics (meropenem, levofloxacin). C diff negative.   Ongoing loose stools overnight and today  - Bowel regimen  - Miralax PRN   - Senna prn  - Will reach out to nutrition for possibility to increase fiber in diet      # C/f  cholecystitis vs choledocholithiasis/distal biliary obstruction  # Cholelithiasis with gallbladder wall thickening  # Elevated bili  # Elevated alk phos, resolved  Admission with elevated alkaline phosphatase (237) with elevated GGT consistent with hepatic etiology, resolved. On 6/27, pt endorsed RUQ pain with palpation and had elevated body temps (99-100F) on CRRT. LFTs and bili (6/27) was also noted have been trending upwards over the past few days. Labs on 6/28 improved, but RUQ U/S (6/28) showed gallbladder wall thickening measuring up to 6 mm with shadowing gallstones present and pericholecystic fluid; common bile duct dilated to 11 mm. Findings c/w cholelithiasis with c/f cholecystitis vs choledocholithiasis/distal biliary obstruction. Lipase negative   - T.Bili 0.8- low concern for obstruction  - Consult GI, appreciate recs  - MRCP completed showing borderline dilated CBD up to 1.1 cm, no significant intrahepatic biliary dilation, no evidence of choledocholithiasis, no evidence of acute cholecystitis.    Renal/Fluids/Electrolytes:  # ESRD on iHD (M,W,F)  History of ESRD 2/2 Tacrolimus toxicity on HD (MWF). With soft blood pressures, placed RIJ (6/20/2024) and started CRRT (6/20/2024).  - Nephrology consulted and following for ESRD   - Currently of CRRT   - Goal net negative 500ml (+2.1L yesterday)  - Plan for iHD today with UF 1-2L per Nephrology    - Renally-dosed medications  -BMP this afternoon to reassess.      Endocrine:  # Risk for hyperglycemia with stress-dose steroids  Given high BG in high 200s on stress dose steroids and fludrocortisone, started on insulin gtt on 6/25. Overnight on 6/26, BG dropped to 77, pt given IV bolus of D50W and sugars corrected. Hypoglycemic episode likely iso titrating down the stress dose steroids. S/p insulin gtt (6/25-27).   - NPH 8U BID  -CTM     ID:  # HAP, Stenotrophomonas maltophilia, Enterococcus faecalis, Aspergillus, and Candida guilliermondi complex  Presented to  the ED on 6/18/2024 with SOB and hypercapnic respiratory failure. Found on bronchoscopy (6/19) to have RML obstruction by narrowing bronchus intermedius as well as copious mucopurulent secretions/mucus plugging. Previously treated for Stenotrophonomas/aspergillus pneumonia in 11/2023 with subsequent sputum culture (2/2024) negative for both Stenotrophonomas/Aspergillus. Etiology likely repeat Stenotrophomonas infection vs opportunistic infection from colonized microbe.   - Workup  - 6/18 sputum cx: Stenotrophomonas maltophilia (ceftazidime and levofloxacin R)  - 6/19 BAL cx: Stenotrophomonas maltophilia, Enterococcus faecalis (gentamicin R), Aspergillus (speciation in process)  - 6/21 BAL cx: Stenotrophomonas maltophilia and Enterococcus faecalis VRE  - 6/24 sputum cx: Stenotrophomonas maltophilia, Enterococcus faecalis VRE, and Candida guilliermondi complex  - Transplant pulmonology consult, appreciate recs  - Transplant ID consult, appreciate recs  - Present antibiotics  - IV minocycline 100mg BD x 14 days total (through 7/5) (6/20-x)- for stenotrophamonas  - PO linezolid 600 mg BID x 10 days (through 7/5) (6/25-x)- for VRE in urine and BAL cultures  - IV micafungin 150 mg daily and posaconozale 300 mg daily (through 7/5) (6/25-x)  for candida guillermondii and asergillus ochraceus on BAL  - Past antibiotics  - S/p ceftazidime (6/25-6/28)    - S/p vancomycin (6/18-6/20)   - S/p zosyn (6/18-21)  - S/p Daptomycin (6/21-6/23)  - S/p Meropenem (6/21-6/24)  - S/p Levofloxacin (6/21-6/23)    - BAL fluid (7/3) pink, hazy, cell counts normal range, fluid sent for viral, bacterial and fungal cultures- pending    # Pyuria, Enterococcus faecium VRE and  ESBL E. Coli   Asymptomatic. With progressive IV pressor needs, obtained broad infectious workup which demonstrated UCx (6/19/2024) with Enterococcus faecium VR and ESBL E. Coli. S/p Daptomycin (6/21-6/23) and Meropenem (6/21-6/24).    # Hx Aspergillus PNA (11/2023)  # Hx PJP  PNA (1/2021)  # Hx CMV viremia, recurrent  # Hx EBV viremia  - Transplant ID consult, appreciate recs  PTA posaconazole (Treatment for hx of aspergillus PNA)  PTA azithromycin 250mg qd (CLAD ppx)  PTA bactrim (PJP ppx)     Hematology:    # Acute on Chronic Normocytic Anemia, stable  # Thrombocytopenia, chronic  History of chronic anemia in the setting of kidney disease (baseline Hgb 10-11). Upon admission, Hgb 9. May be bone marrow suppression in the setting of chronic illness; potential contribution by blood loss with CRRT filter changes (~150-200c). Peripheral smear (6/18/2024) without evidence of schistocytes.  - Hb stable  - Continue to monitor CBC    Musculoskeletal:  - PT / OT consult     Skin:  - No acute concerns.Blanching- off lopad    General Cares/Prophylaxis:    DVT Prophylaxis: Heparin SQ  GI Prophylaxis: PPI  Restraints: None  Family Communication: , will update over phone or in person  Code Status: Full Code     Lines/tubes/drains:  - PIV x2, midline  - RIJ  - A line  - NGT  - ETT    Disposition:  - Medical ICU   - Palliate Consult placed per pulm transplant team recs.    Patient seen and findings/plan discussed with medical ICU staff, Dr. James.    Edin Davis MD  Internal Medicine Resident, PGY-1      Clinically Significant Risk Factors        # Hypokalemia: Lowest K = 3.3 mmol/L in last 2 days, will replace as needed       # Hypoalbuminemia: Lowest albumin = 2.2 g/dL at 6/21/2024  4:26 PM, will monitor as appropriate   # Thrombocytopenia: Lowest platelets = 94 in last 2 days, will monitor for bleeding                 #Precipitous drop in Hgb/Hct: Lowest Hgb this hospitalization: 6.9 g/dL. Will continue to monitor and treat/transfuse as appropriate.      # Moderate Malnutrition: based on nutrition assessment    # Financial/Environmental Concerns: none                  ====================================  INTERVAL HISTORY:   Patient required phenylephrine for short time overnight  for MAPs>65. S/p trach yesterday without complication. Improved sleep last night per patient and . Patient awake and alert and following commands this morning. Complaints of ongoing nausea post procedure and increased frequency of loose stools. Will have iHD today per nephrology.       OBJECTIVE:   1. VITAL SIGNS:   Temp:  [97.1  F (36.2  C)-98.3  F (36.8  C)] 97.1  F (36.2  C)  Pulse:  [76-95] 90  Resp:  [21-30] 24  MAP:  [53 mmHg-144 mmHg] 69 mmHg  Arterial Line BP: ()/() 119/45  FiO2 (%):  [45 %] 45 %  SpO2:  [88 %-100 %] 95 %  Vent Mode: CMV/AC  (Continuous Mandatory Ventilation/ Assist Control)  FiO2 (%): 45 %  Resp Rate (Set): 18 breaths/min  Tidal Volume (Set, mL): 320 mL  PEEP (cm H2O): 5 cmH2O  Pressure Support (cm H2O): 12 cmH2O  Resp: 24    2. INTAKE/ OUTPUT:   I/O last 3 completed shifts:  In: 2156.02 [I.V.:1071.02; NG/GT:635]  Out: -     3. PHYSICAL EXAMINATION:  General: Sitting up in bed, NAD   HEENT: Atraumatic, moist mucous membranes   Pulm/Resp: Ongoing coarse breath sounds, unchanged from prior exam. Mild expiratory wheezing, stable. Good air movement throughout.   CV: RRR, no m/r/g   Abdomen: Soft, non-distended, non-tender, bowel sounds present.  Ext: Trace bilateral LE edema, pulses 2+ radial, pedal  Incisions/Skin: No rashes or lesions, trach in place with oozing on gauze, no signs of bleeding  Neuro: Awake and alert, follows 1-step and 2-step commands and nods and shakes head to questions, moving all extremities equally    4. LABS:   Arterial Blood Gases   Recent Labs   Lab 06/30/24  1537   PH 7.38   PCO2 41   PO2 154*   HCO3 24     Complete Blood Count   Recent Labs   Lab 07/04/24  0335 07/03/24  0422 07/02/24  0359 07/01/24  0346   WBC 6.8 6.5 6.8 7.8   HGB 8.4* 8.7* 9.0* 9.0*   PLT 94* 94* 100* 131*     Basic Metabolic Panel  Recent Labs   Lab 07/04/24  0340 07/04/24  0335 07/03/24  2347 07/03/24  1951 07/03/24  1614 07/03/24  1117 07/03/24  0429 07/03/24  0422  07/02/24  0409 07/02/24  0359   NA  --  132*  --   --   --  134*  --  137  --  138   POTASSIUM  --  3.6  --   --   --  3.4  --  3.3*  --  3.6   CHLORIDE  --  103  --   --   --  103  --  105  --  108*   CO2  --  18*  --   --   --  20*  --  21*  --  22   BUN  --  67.4*  --   --   --  54.2*  --  47.6*  --  28.2*   CR  --  2.30*  --   --   --  1.84*  --  1.61*  --  0.91   * 143* 103* 166*   < > 128*   < > 154*   < > 161*    < > = values in this interval not displayed.     Liver Function Tests  Recent Labs   Lab 07/02/24  0359 07/01/24  1637 07/01/24  0346 06/30/24  1527 06/30/24  0332 06/29/24  1604 06/29/24  0359   AST 26  --  25  --  26  --  22   ALT 37  --  36  --  38  --  36   ALKPHOS 176*  --  173*  --  178*  --  146   BILITOTAL 0.6  --  0.6  --  0.8  --  0.8   ALBUMIN 2.8* 2.7* 2.6* 2.7* 2.6*   < > 2.5*    < > = values in this interval not displayed.     Coagulation Profile  No lab results found in last 7 days.      5. RADIOLOGY:   No results found for this or any previous visit (from the past 24 hour(s)).

## 2024-07-04 NOTE — PROGRESS NOTES
Date: 11/17/2023  Time: 1400     Data:  Pre Wt:   50.1 kg Bed Scale  Desired Wt:   To be established  Post Wt:  48.5 kg Bed Scale  Weight change: - 1.6 kg  Ultrafiltration - Post Run Net Total Removed (mL):  1400 ml  Vascular Access Status: Fistula patent  Dialyzer Rinse:  Light  Total Blood Volume Processed: 77.7 L   Total Dialysis (Treatment) Time:   3.0 Hrs  Dialysate Bath: K 3, Ca 3  Heparin: Heparin: None     Lab:   Yes  Awaiting HbsAg and HbsAb drawn today.       Interventions:  Dialysis done through YASMIN AV Fistula using 15 gauge needles  UF set to 2.5 Liters, accommodating priming and rinse back volumes  ,   See MAR for medications administered   CritLine showed a stable Profile A/BC throughout the run, tolerating UF pull  Glucose checks done. Pre-HD: 143 mg/dl; Inta-dialysis: 165 mg/dl; ICU RN monitoring BG  Decannulation done post HD, hemostasis is achieved in 10 minutes  Pressure dressing is applied, to be removed after 4-6 hours  Report given to Rona SMITH. Pt stable condition     Assessment:  A/O x 4, calm and cooperative, denies pain  YASMIN arm AV Fistula has good thrill and bruit    3 hour run today.  is at bedside. Pt is transitioning from CRRT to iHD. This RN spoke with out-patient dialysis center ([phone]) RN r/t access cannulation. At first glance the access appears like a loop. It is fistula that is cannulated using the proximal side with the venous needle being the most distal and arterial needle above staying clear of the aneurysm. The pt is reportedly a bleeder per . 1 time additional 15 mg dose of midodrine at start of tx to support UF. BP supported UF however, crit line steep, UF off to allow for refill and UF resumed. UF goal reduced to 2.0L. Net UF removed 1.4L. BP maintained within parameters yet crit line did not support UF. Prolonged bleed time at 20 mins. May need to consider fistulogram. Overall, pt tolerated treatment.                 Plan:    Per Renal  team        Ernesto Palafox, RN  Acute Dialysis RN  [facility]

## 2024-07-04 NOTE — PROGRESS NOTES
"ENT DAILY TRACHEOSTOMY PROGRESS NOTE  7/4/2024    Subjective: Patient is alert and interactive. Mouths answers to questions. No acute events overnight. No active bleeding from the trach.       Objective:  /44 (BP Location: Right leg)   Pulse 90   Temp 97.1  F (36.2  C) (Axillary)   Resp 24   Ht 1.6 m (5' 3\")   Wt 50.1 kg (110 lb 7.2 oz)   LMP 06/07/2014 (Exact Date)   SpO2 95%   BMI 19.57 kg/m     General: No acute distress   HEENT: 6-0 Shiley trach in place and secured with 2-0 prolene and trach ties. No evidence of peristoma skin breakdown. Ties with appropriate tightness, skin inspected for breakdown under ties and there is no evidence of irritation.    Pulmonary: on vent       Assessment/Plan: Ms. Blue is a 63 year old female POD #1 s/p tracheostomy for respiratory failure and prolonged ventilation dependance.    Trach Tube Instructions:  1) Contact ENT on-call with any questions or concerns  2) The first changing of trach, sponge, and or ties will be performed by ENT on POD5. Afterwards, other hospital staff can manage these items as needed. Prior to first trach change please do not manipulate the trach ties or cut any sutures.   3) Perform regular suctioning per orders  4) RN should change or clean disposable inner canulas q4h   5) Keep trach obturator taped to wall behind the head of the bed  6) Keep extra unopened trach of same size and one size smaller at bedside at all times   7) Minimize the amount of air in the cuff needed to adequately apply positive pressure ventilate. If positive pressure ventilation is not need then the cuff should be down.       Patient and plan discussed with Dr. Noe Corea MD  Otolaryngology-Head & Neck Surgery PGY-2  "

## 2024-07-04 NOTE — PLAN OF CARE
Neuro: AOx4, using call light, follow 4/5 in all extremities, denies pain  Cardio: SR to ST 70-110s, a fib briefly during HD; MAP >65 on scheduled midodrine, afebrile; K replaced (3.1)  Respiratory: trach CMV 45% RR 18  PEEP 5; PS 12/5 for 45 min and 30min, minimal secretions  GI/:anuric on HD (pulled 1.4L today), loose watery stools, started fiber, TF @ goal 30ml/h with SFWF, compazine for nausea after morning meds  Skin: blanchable redness on sacrum/ IT/ buttocks, heels mepilex on; red/dusky hands feet from raynauds    Palliative met with patient and family today    Handoff given to following RN. For VS and complete assessments, please see documentation flowsheets.    Problem: Gas Exchange Impaired  Goal: Optimal Gas Exchange  Outcome: Not Progressing  Intervention: Optimize Oxygenation and Ventilation  Recent Flowsheet Documentation  Taken 7/4/2024 0800 by Rona Prasad, RN  Airway/Ventilation Management:   airway patency maintained   calming measures promoted   humidification applied   pulmonary hygiene promoted  Head of Bed (HOB) Positioning: HOB at 30 degrees

## 2024-07-04 NOTE — PROGRESS NOTES
ICU End of Shift Summary. See flowsheets for vital signs and detailed assessment.    Changes this shift: RASS 0 to -1. Follows commands. Calls appropriately and able to make needs known. Scheduled tylenol providing adequate pain control per pt. CMV 45%, 320, 18, 5. SR. Phenyl required for short time to maintain MAP goal. No BM. Aneuric.     Plan: continue plan of care. Monitor hemodynamics and respiratory status. Plan for PEG-J 7/5. HD today.

## 2024-07-04 NOTE — PROGRESS NOTES
"CLINICAL NUTRITION SERVICES - BRIEF NOTE    Nutrition Prescription    RECOMMENDATIONS FOR MDs/PROVIDERS TO ORDER:  - Message received regarding loose stools.   - Continue to assess medications and limit suspension and/or liquid medications (#1 cause of diarrhea in ICU pts)  - Hold bowel regimen     Recommendations already ordered by Registered Dietitian (RD):  - Continue current regimen as ordered   - Add trial of Nutrisource Fiber and monitor: 3 packets daily   - Changed liquid MVI to tab (Nephrocaps)    Future/Additional Recommendations:  - Ongoing EN monitoring   - GI status/stools      Per zain review: Diarrhea   On 6/21, had 8 bowel movements. Occurred in the setting of scheduled bowel regimen and starting new antibiotics (meropenem, levofloxacin). C diff negative.   7/4: Ongoing loose stools overnight and today  - Bowel regimen  - Miralax PRN   - Senna prn  - Team request to nutrition for \"possibility to increase fiber in diet.\"    Nutrition Progress Note - f/u for progress towards previous nutrition POC (see previous reassessment for details)     Raciel Hassan, RD, LD, [facility]  Neuro ICU Dietitian; 6A Neuro  Vocera \"4E Clinical Dietitian\"  Weekend/holiday \"Weekend Clinical Dietitian\"      Unit RD via Vocera on weekdays/non-holidays  "

## 2024-07-04 NOTE — PROGRESS NOTES
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 Nephrology consulted for management of HD while admitted.      Interval History :   Off dialysis yesterday  I/O 2 L  Anuric  Had trach 7/3  Hemodynamically stable, off pressors    Assessment & Recommendations:   ESRD-ESKD: pt dialyzes MWF at [facility] (ph [phone], f [phone]) with Dr. Glasgow. Run time: 3.5 hrs. EDW 52.5 kg. Access: L AVF. +heparin 1,500u bolus.                  -Currently with tri-alysis line, that was used for CRRT. Will consider removing it once she is stable on IHD     -IHD today via AVF, 3h/1-2L                -No need for new dialysis consent, continuing long term HD for ESRD.                -L AVF with remote hx of needing intervention, no issue recently.     Volume/BP: hemodynamically stable and in 2L positive balance. UF ~2L today      Electrolytes: Mild hyponatremia, otherwise, no acute issues     BMD-No acute issues.      Anemia-Hgb 8~9. She has been on venofer 50mg weekly but held while treating for infection. On Mircera 60mcg [id number], will cover with short term Epo while admitted when stable enough for HD.       Nutrition-[facility] renal      Review of Systems:   I reviewed the following systems:  ROS not done due to vent/sedation    Physical Exam:   I/O last 3 completed shifts:  In: 2156.02 [I.V.:1071.02; NG/GT:635]  Out: -    /44 (BP Location: Right leg)   Pulse 90   Temp 97.6  F (36.4  C)   Resp 24   Ht 1.6 m (5' 3\")   Wt 50.1 kg (110 lb 7.2 oz)   LMP 06/07/2014 (Exact Date)   SpO2 95%   BMI 19.57 kg/m       GENERAL APPEARANCE: Intubated but " alert.    EYES: No scleral icterus  Pulmonary: lungs Rhonchi to auscultation with equal breath sounds bilaterally  CV: Regular rhythm, normal rate   - Edema none  GI: soft, nontender, normal bowel sounds  MS: no evidence of inflammation in joints, no muscle tenderness  : No Basilio  SKIN: no rash, warm, dry  NEURO: No focal deficits    Labs:   All labs reviewed by me    Current Medications: reviewed    Time spent: 50 minutes on this date of encounter for chart review, physical exam, medical decision making and co-ordination of care.     Recommendations were communicated to primary team via verbal communication.     SHANTEL LAZO MD  Date of Service (when I saw the patient):July 4, 2024

## 2024-07-04 NOTE — CONSULTS
Palliative Care Consultation Note  [facility]      Patient: Kecia Blue  Date of Admission:  6/18/2024    Requesting Clinician / Team: [email or web address]  Reason for consult: Goals of care and patient/family support       Recommendations & Counseling     GOALS OF CARE:   Life-prolonging without limits at this time  Kecia does not want any treatment that will only prolong her dying if there is no reasonable hope of recovery to an active life.    ADVANCE CARE PLANNING:  Patient has an advance directive dated January 2018.  Primary Health Care Agent  Ranjan.  Alternate(s) three daughters as co-alternatives.   There is no POLST form on file, defer to patient and/or next of kin for decisions   Code status: Full Code    MEDICAL MANAGEMENT:   We are not actively managing symptoms at this time.    PSYCHOSOCIAL/SPIRITUAL SUPPORT:  Family  and three daughters, children in law and grand children  Friends many back home in [place], [place]    Palliative Care will continue to follow during this admission and will be available as outpatient clinic as well. Thank you for the consult and allowing us to aid in the care of Kecia Blue.    These recommendations have been discussed with primary team via this note.    TTS: I spent a total of 69 minutes  on the date of service reviewing the medical record, consulting with the medical providers and assessing the patient, coordinating care, performing other activities listed above and completing documentation.    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  The patient was admitted on 6/18/24 for progressive hypoxia with dyspnea and worsening hypercapnia in ED requiring intubation likely d/t post-obstructive PNA 2/2 right bronchus stenosis.  Bronch confirming severe stenosis of right anastomosis with extensive RLL plugging.  IP bronch (6/20) with laser tissue debulking RMB stenosis, airway balloon dilation of RMB and BI, and placement of stent RMB to BI and bifurcating stent in RUL.  S/p volume resuscitation and transition to CRRT 6/20 for hypotension, stopped 7/2.  Increased agitation/delirium on 6/22 with increasing desaturation led to need for increased sedation.  Recurrence of paroxysmal afib with RVR first noted 6/25.  Remains intubated with pulmonary edema on imaging and septic shock requiring pressor support, limited/variable tolerance of PST.  She underwent Trach on 7/3/24 and is scheduled for PEG on July 5, 2024.    Today, the patient was seen for:  Family support    History of Present Illness   Met with  Ranjan (Kecia was asleep).   I introduced our role as an extra layer of support and how we help patients and families dealing with serious, potentially life-limiting illnesses. I explained the composition of the palliative care team.  Palliative care helps patients and families navigate their care while focusing on the whole person; providing emotional, social and spiritual support  Palliative care often assists with symptom management, information sharing about what to expect from the illness, available treatment options and what effect those options may have on the disease course, and provide effective communication and caring support.    Ranjan provided a rich history of Kecia's past situation with her urgent  transplantation and her many post op complications including ESRD for which she receives iHD MWF.  He also described that they faced the possibility of her dying prior to transplant and also 3 years ago when she had PJP pneumonia.  Ranjan says that at that time Kecia knew she had his permission to stop cares and pivot to comfort care.  She chose trache instead and was decanulated after three weeks.  The trache made a big difference in her recovery.  He hopes the same might occur now and he readily recognizes they have had 6 more years 'that we ever thought we'd have (due to the tx) and that Kecia has ill lungs and kidneys and he knows treatment options will be limited some day.    Prognosis, Goals, & Planning:   Functional Status just prior to this current hospitalization:  Outpatient Palliative Performance Score (PPS) 70%  Significant evidence of disease.  Full/normal self-care & LOC; normal or reduced intake, reduced ambulation and activity/work.      Prognosis, Goals, and/or Advance Care Planning:  We discussed general treatment options (full/restorative, selective/conservatives, and comfort only/hospice). We then discussed how these specifically apply to Kecia.  Based on this discussion, Ranjan has decided to continue current care plan and see if Kecia can't rebound from this acute insult like she did in 2021 when she also required a trache for three weeks.    Code Status was addressed today:   Yes, We discussed potential risks and rationale of attempting cardiac resuscitation, intubation, and mechanical ventilation.  We also discussed probability of survival as well as quality of life implications.  Based on this discussion, patient or surrogate response/decision: FULL code      Patient's decision making preferences: with input from medical clinicians and loved ones        Patient has decision-making capacity today for complex decisions: Questionable given some encephalopathy post trache placement.           Coping, Meaning, & Spirituality:   Mood, coping, and/or meaning in the context of serious illness were addressed today: Yes with  who is doing OK at this time and has some good self care.    Social:   Living situation:lives with significant other/spouse  Important relationships/caregivers: Ranjan of four plus decades; three daughters--one is a traveling ICU nurse  Areas of fulfillment/elton: being with family  Kecia is famous for 'doing everything really well.'    Medications:  Reviewed this patient's medication profile and medications from this hospitalization.     [place] Board of Pharmacy Data Base Reviewed: Yes:   reviewed - no record of controlled substances prescribed.    ROS:  Comprehensive ROS is reviewed and is negative except as here & per HPI:     Physical Exam   Vital Signs with Ranges  Temp:  [97.1  F (36.2  C)-98.3  F (36.8  C)] 97.6  F (36.4  C)  Pulse:  [] 93  Resp:  [21-30] 23  MAP:  [53 mmHg-83 mmHg] 75 mmHg  Arterial Line BP: ()/(38-62) 130/51  FiO2 (%):  [45 %] 45 %  SpO2:  [90 %-100 %] 100 %  Wt Readings from Last 10 Encounters:   07/04/24 50.1 kg (110 lb 7.2 oz)   05/16/24 54.4 kg (120 lb)   03/13/24 55.3 kg (122 lb)   03/05/24 57.7 kg (127 lb 4.8 oz)   02/15/24 58 kg (127 lb 13.9 oz)   12/18/23 58.1 kg (128 lb)   12/13/23 58.4 kg (128 lb 11.2 oz)   11/29/23 55.7 kg (122 lb 12.7 oz)   11/21/23 58.7 kg (129 lb 6.4 oz)   11/17/23 57.8 kg (127 lb 6.8 oz)     110 lbs 7.21 oz    PHYSICAL EXAM:  Limited exam--Kecia was resting quietly with trache appearing CDI and attached to MV and NJ tube in place.    Data reviewed:  Recent Labs   Lab 07/04/24  1631 07/04/24  1550 07/04/24  1427 07/04/24  0340 07/04/24  0335 07/03/24  1614 07/03/24  1117 07/03/24  0429 07/03/24  0422 07/02/24  0409 07/02/24  0359 07/01/24  1640 07/01/24  1637 07/01/24  0353 07/01/24  0346   WBC  --   --   --   --  6.8  --   --   --  6.5  --  6.8  --   --   --  7.8   HGB  --   --   --   --  8.4*  --    --   --  8.7*  --  9.0*  --   --   --  9.0*   MCV  --   --   --   --  100  --   --   --  98  --  98  --   --   --  100   PLT  --   --   --   --  94*  --   --   --  94*  --  100*  --   --   --  131*     --   --   --  132*  --  134*  --  137  --  138  --  137  --  138   POTASSIUM 3.1*  --   --   --  3.6  --  3.4  --  3.3*  --  3.6  --  3.8  --  3.6   CHLORIDE 101  --   --   --  103  --  103  --  105  --  108*  --  105  --  107   CO2 23  --   --   --  18*  --  20*  --  21*  --  22  --  23  --  22   BUN 26.4*  --   --   --  67.4*  --  54.2*  --  47.6*  --  28.2*  --  29.2*  --  28.1*   CR 1.18*  --   --   --  2.30*  --  1.84*  --  1.61*  --  0.91  --  0.95  --  0.89   ANIONGAP 11  --   --   --  11  --  11  --  11  --  8  --  9  --  9   CHELSEY 8.1*  --   --   --  7.7*  --  8.1*  --  8.0*  --  8.1*  --  7.6*  --  7.8*   * 171* 158*   < > 143*   < > 128*   < > 154*   < > 161*   < > 155*   < > 170*   ALBUMIN  --   --   --   --   --   --   --   --   --   --  2.8*  --  2.7*  --  2.6*   PROTTOTAL  --   --   --   --   --   --   --   --   --   --  5.4*  --   --   --  5.0*   BILITOTAL  --   --   --   --   --   --   --   --   --   --  0.6  --   --   --  0.6   ALKPHOS  --   --   --   --   --   --   --   --   --   --  176*  --   --   --  173*   ALT  --   --   --   --   --   --   --   --   --   --  37  --   --   --  36   AST  --   --   --   --   --   --   --   --   --   --  26  --   --   --  25    < > = values in this interval not displayed.

## 2024-07-04 NOTE — PROGRESS NOTES
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  The patient was admitted on 24 for progressive hypoxia with dyspnea and worsening hypercapnia in ED requiring intubation likely d/t post-obstructive PNA 2/2 right bronchus stenosis.  Bronch confirming severe stenosis of right anastomosis with extensive RLL plugging.  IP bronch () with laser tissue debulking RMB stenosis, airway balloon dilation of RMB and BI, and placement of stent RMB to BI and bifurcating stent in RUL.  S/p volume resuscitation and transition to CRRT  for hypotension, stopped .  Increased agitation/delirium on  with increasing desaturation led to need for increased sedation.  Recurrence of paroxysmal afib with RVR first noted .  Remains intubated with pulmonary edema on imaging and septic shock requiring pressor support, limited/variable tolerance of PST.  She underwent Trach on 7/3/24.      Today's recommendations:  - Continue antimicrobial regimen per transplant ID  - Repeat bronch with BAL on 7/3/24 based on CT chest findings , will consider steroid burst/taper pending cultures and clinical course  - Tentative plan for GJ tube placement with IR tomorrow.  - Prospera (7/3) pending  - DSA (7/3) pending  - Tacrolimus level ordered  7/5  - Prednisone chronic dosing to resume if hydrocortisone <20 mg daily  - Bactrim MWF for PJP ppx (monitor need to adjust pending HD plan)  - Awaiting susceptibilities on Aspergillus per transplant ID  - CMV weekly monitoring (qTuesday), continue VGCV ppx (renally dosed, monitor need to adjust pending HD plan) with tentative plan for 3 weeks beyond completion of stress dose steroids      Septic shock:  Acute on chronic hypoxic/hypercapneic respiratory failure:  Post-obstructive multi-lobar PNA:  Right bronchial stenosis with RML collapse: Admitted with 2 weeks of progressive hypoxia, dyspnea, congested cough, and fatigue.  Chronic hypoxia with 2L NC, increased from 3 to 4L over this time with acute decompensation on day of admission associated with hypercapnia.  VBG 7/17/85 in ED s/p intubation.  IP bronch 2/15 s/p tissue debulking without stent placement.  Negative ID workup: respiratory panel, COVID, MRSA nares, PJP, Legionella, Strep pneumoniae, cocci, Histo, CrAg, fungitell x2, and A. galactomannan (blood).  IgG WNL.  Procal mildly elevated at 0.24 initially (then elevated to 1.77 6/20), LA normal, but febrile to 100.7.  TTE on admission grossly normal.  CT with RUL/RLL consolidative opacities, RML collapse, and narrowing of BI and distal RLL bronchus.  Started on norepi 6/19.  Bronch per MICU (6/19) with severe stenosis starting at right anastomosis, inspection with smaller scope revealing for extensive RLL mucous plugging.  Bronch per IP (6/20) with laser tissue debulking RMB stenosis, airway balloon dilation of RMB and BI, and placement of stent RMB to BI and bifurcating stent in RUL.  Intermittent/low grade fevers persisting.  Respiratory cultures with Steno, VSE, VRE, Aspergillus ochraceus, and Elidia guilliermondii.  Chest CT (6/29) with multifocal panlobar opacities and small bibasilar pleural effusions.  Remains on full vent support, limited/variable tolerance of PST, Trach'ed on 7/3/24 by ENT.  -  The Etiology of GGO noted on Chest CT is unclear, will repeat CXR tomorrow.  - ABX: linezolid (6/25-7/5), and minocycline (6/20-7/5, Steno) per transplant ID; s/p ceftazidime (6/25-6/28), meropenem (6/21-6/24), daptomycin (6/21-6/24), levofloxacin (6/21-6/23), Zosyn (6/18-6/21), azithromycin 500 mg daily (6/18-6/20), and vancomycin (6/18)   - Nebs: levalbuterol QID, Mucomyst 10% BID alternating with 3% HTS (6/22), will need to be on NS nebs BID upon discharge per IP  - Vent management and weaning per MICU  - Stress dose steroids per MICU (started 6/21)  - Repeat bronch with BAL on 7/3/24 based on CT chest findings 6/29, will consider steroid burst/taper pending cultures and clinical course  - Tentative plan for GJ tube placement with IR tomorrow.  - Palliative care consultation pending.     S/p bilateral sequential lung transplant (BSLT) for ILD:   Progressive CLAD: Most recent OP visit in February.  DSA negative 6/19 (last positive noted 2018).  Repeat ImmuKnow (6/19) 87, very low but IST adjustment deferred per Dr. Graham given acuity and steroids (after ImmuKnow level).  Repeat Prospera remains high at 2.3 on 6/19, may be artificially elevated with current infection, but also notably high at 2.42 on 2/14.  - Repeat Prospera (7/3) pending  - Repeat DSA (7/3) pending  - Repeat ImmuKnow in 4-6 weeks  - PTA Singulair, azithromycin, and Breo inhaler (resume once extubated)     Immunosuppression:  On 2-drug IST since November d/t leukopenia and recurrent infections  - Tacrolimus 0.8 mg BID (increased 6/29).  Goal level 8-10.  Repeat level ordered 7/5.  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  - ABX and infectious work up as above     Aspergillus ochraceus:  H/o Aspergillus empyema:  S/p empyema drainage  and ampho B instillation.  Previously on voriconazole, transitioned to posaconazole and managed by transplant ID as OP with last visit 3/13.  Calcified fungal elements remain with additional recurrent effusion (likely associated with fluid disturbances r/t iHD), but surgical intervention previously deferred d/t risk.  Posaconazole level 2.5 on 6/19.  - Awaiting susceptibilities on Aspergillus (bronch culture 6/19) per transplant ID  - Posaconazole 300 mg daily chronically, micafungin 150 mg daily (6/25) per transplant ID     H/o CMV viremia: Recurrent CMV viremia historically.  VGCV ppx most recently stopped 4/12.  Recent CMV low positive at 46 (6/12), <35 (6/18), and 39 (6/25, 7/2).  - CMV weekly monitoring (qTuesday) with steroid increase  - VGCV ppx (renally dosed, monitor need to adjust pending HD plan) given stress dose steroids (6/24) with tentative plan for 3 weeks beyond completion of stress dose steroids      EBV viremia: S/p rituximab 12/13/23 x1 dose.  Most recent EBV negative 6/18.     Dilated CBD:  Suspected acute cholecystitis: MRCP (6/29) with CBD dilation with stones and cholelithiasis without cholecystitis.  GI signed off 6/30, recommending general surgery consult if RUQ pain persists.     ESRD on iHD: Kidney evaluation started as OP.  On qMWF iHD schedule, no missed sessions.  Transitioned to CRRT on 6/20, stopped 7/2.  Management per renal and MICU with fluid removal as able.      VRE urine culture: Noted 6/19, >100k GNB and VRE faecium, ABX as above with management per transplant ID and MICU    We appreciate the excellent care provided by the MICU team.  Recommendations communicated via in person rounding and this note.  Will continue to follow along closely, please do not hesitate to call with any questions or concerns.     Subjective & Interval History:     PS 12/5 40-50% x3hrs 22mins otherwise full vent support. iHD today. Weight down.  Loose stools, no nausea.      Review of Systems:     ROS  "as above otherwise significantly limited due to intubation and sedation.     Physical Exam:     All notes, images, and labs from past 24 hours (at minimum) were reviewed.    Vital signs:  Temp: 97.5  F (36.4  C) Temp src: Axillary   Pulse: 91   Resp: 22 SpO2: 92 % O2 Device: Mechanical Ventilator Oxygen Delivery: 6 LPM Height: 160 cm (5' 3\") Weight: 50.1 kg (110 lb 7.2 oz)  I/O:     Intake/Output Summary (Last 24 hours) at 7/4/2024 1402  Last data filed at 7/4/2024 1400  Gross per 24 hour   Intake 1782.86 ml   Output 1400 ml   Net 382.86 ml     Constitutional: Sitting up in chair, in no apparent distress.   HEENT: Eyes with pink conjunctivae, anicteric.  Orally intubated via ETT.  Nasal FT.  PULM: Good air flow bilaterally.  Coarse crackles t/o.  No rhonchi, no wheezes.  Non-labored breathing on full vent support.  CV: Normal S1 and S2.  RRR.  No murmur, gallop, or rub.  No peripheral edema.   ABD: NABS, soft, nondistended.    MSK: Moves all extremities.  + muscle wasting.   NEURO: Opens eyes to voice, able to answer some yes/no questions by nodding/shaking head.  SKIN: Warm, dry, fragile.  Bilateral toes reddened.  PSYCH: Calm.     Data:     LABS    CMP:   Recent Labs   Lab 07/04/24  1245 07/04/24  0832 07/04/24  0340 07/04/24  0335 07/03/24  1614 07/03/24  1117 07/03/24  0429 07/03/24  0422 07/02/24  0409 07/02/24  0359 07/01/24  1640 07/01/24  1637 07/01/24  0353 07/01/24  0346 06/30/24  1537 06/30/24  1527 06/30/24  0338 06/30/24  0332 06/29/24  0405 06/29/24  0359   NA  --   --   --  132*  --  134*  --  137  --  138  --  137  --  138  --  137  --  137   < > 138   POTASSIUM  --   --   --  3.6  --  3.4  --  3.3*  --  3.6  --  3.8  --  3.6  --  3.8  --  3.9   < > 3.5   CHLORIDE  --   --   --  103  --  103  --  105  --  108*  --  105  --  107  --  106  --  107   < > 107   CO2  --   --   --  18*  --  20*  --  21*  --  22  --  23  --  22  --  21*  --  21*   < > 22   ANIONGAP  --   --   --  11  --  11  --  11  --  8  " --  9  --  9  --  10  --  9   < > 9   * 143* 132* 143*   < > 128*   < > 154*   < > 161*   < > 155*   < > 170*   < > 173*   < > 192*   < > 199*   BUN  --   --   --  67.4*  --  54.2*  --  47.6*  --  28.2*  --  29.2*  --  28.1*  --  28.1*  --  29.0*   < > 28.2*   CR  --   --   --  2.30*  --  1.84*  --  1.61*  --  0.91  --  0.95  --  0.89  --  0.88  --  0.96*   < > 0.88   GFRESTIMATED  --   --   --  23*  --  31*  --  36*  --  71  --  68  --  73  --  74  --  67   < > 74   CHELSEY  --   --   --  7.7*  --  8.1*  --  8.0*  --  8.1*  --  7.6*  --  7.8*  --  7.9*  --  7.4*   < > 7.5*   MAG  --   --   --  2.6*  --   --   --  2.6*  --  2.5*  --  2.4*  --  2.5*  --  2.5*  --  2.4*   < > 2.4*   PHOS  --   --   --  7.0*  --   --   --  6.1*  --  4.8*  --  4.7*  --  4.7*  --  4.6*  --  4.8*   < > 3.9   PROTTOTAL  --   --   --   --   --   --   --   --   --  5.4*  --   --   --  5.0*  --   --   --  4.9*  --  4.7*   ALBUMIN  --   --   --   --   --   --   --   --   --  2.8*  --  2.7*  --  2.6*  --  2.7*  --  2.6*   < > 2.5*   BILITOTAL  --   --   --   --   --   --   --   --   --  0.6  --   --   --  0.6  --   --   --  0.8  --  0.8   ALKPHOS  --   --   --   --   --   --   --   --   --  176*  --   --   --  173*  --   --   --  178*  --  146   AST  --   --   --   --   --   --   --   --   --  26  --   --   --  25  --   --   --  26  --  22   ALT  --   --   --   --   --   --   --   --   --  37  --   --   --  36  --   --   --  38  --  36    < > = values in this interval not displayed.     CBC:   Recent Labs   Lab 07/04/24  0335 07/03/24  0422 07/02/24  0359 07/01/24  0346   WBC 6.8 6.5 6.8 7.8   RBC 2.65* 2.78* 2.90* 2.94*   HGB 8.4* 8.7* 9.0* 9.0*   HCT 26.4* 27.2* 28.3* 29.4*    98 98 100   MCH 31.7 31.3 31.0 30.6   MCHC 31.8 32.0 31.8 30.6*   RDW 18.7* 18.4* 17.9* 17.3*   PLT 94* 94* 100* 131*       INR: No lab results found in last 7 days.    Glucose:   Recent Labs   Lab 07/04/24  1245 07/04/24  0832 07/04/24  0340 07/04/24  0335  "07/03/24  2347 07/03/24  1951   * 143* 132* 143* 103* 166*       Blood Gas:   Recent Labs   Lab 06/30/24  1537   O2PER 45       Culture Data No results for input(s): \"CULT\" in the last 168 hours.    Virology Data:   Lab Results   Component Value Date    FLUAH1 Not Detected 06/18/2024    FLUAH3 Not Detected 06/18/2024    [id number] Not Detected 06/18/2024    IFLUB Not Detected 06/18/2024    RSVA Not Detected 06/18/2024    RSVB Not Detected 06/18/2024    PIV1 Not Detected 06/18/2024    PIV2 Not Detected 06/18/2024    PIV3 Not Detected 06/18/2024    HMPV Not Detected 06/18/2024    HRVS Negative 01/24/2021    ADVBE Negative 01/24/2021    ADVC Negative 01/24/2021    ADVC Negative 12/23/2020    ADVC Negative 10/07/2019       Historical CMV results (last 3 of prior testing):  Lab Results   Component Value Date    CMVQNT <35 (A) 06/18/2024    CMVQNT Not Detected 03/05/2024    CMVQNT Not Detected 12/19/2023     Lab Results   Component Value Date    CMVLOG 1.6 07/02/2024    CMVLOG 1.6 06/25/2024    CMVLOG <1.5 06/18/2024       Urine Studies    Recent Labs   Lab Test 06/19/24  1214 01/24/21  1729   URINEPH 6.5 5.0   NITRITE Negative Negative   LEUKEST Large* Moderate*   WBCU >182* 34*       Most Recent [place]ze Pulmonary Function Testing (FVC/FEV1 only)  FVC-Pre   Date Value Ref Range Status   02/14/2024 0.85 L    12/19/2023 1.04 L    11/21/2023 0.85 L    10/24/2023 0.96 L      FVC-%Pred-Pre   Date Value Ref Range Status   02/14/2024 29 %    12/19/2023 36 %    11/21/2023 29 %    10/24/2023 33 %      FEV1-Pre   Date Value Ref Range Status   02/14/2024 0.80 L    12/19/2023 0.89 L    11/21/2023 0.78 L    10/24/2023 0.87 L      FEV1-%Pred-Pre   Date Value Ref Range Status   02/14/2024 34 %    12/19/2023 38 %    11/21/2023 33 %    10/24/2023 37 %        IMAGING    Recent Results (from the past 48 hour(s))   XR Chest Port 1 View    Narrative    EXAM: XR CHEST PORT 1 VIEW  7/2/2024 3:44 PM      HISTORY: Lung tx, intubated. CT on " 6/29 showed GGO    COMPARISON: CT 6/29/2024, 6/26/2024 radiograph    FINDINGS: Single view of the chest. An endotracheal tube terminates in  the midthoracic trachea. Postoperative changes in the chest and intact  median sternotomy wires. Enteric tube terminates in the stomach and  duodenum. Right IJ central venous catheter tip terminates at the  superior cavoatrial junction. Right bronchial stent is unchanged.   Trachea is midline. Cardiac silhouette is stable. Diffuse hazy opacity  throughout the left greater than right lungs, slightly improved  compared to 6/26/2024. No pleural effusion. No pneumothorax.      Impression    IMPRESSION:   1. Endotracheal tube terminates in the midthoracic trachea. Additional  support devices are stable.  2. Hazy opacities throughout the left greater the right lungs, overall  improved compared to radiograph dated 6/26/2024.    I have personally reviewed the examination and initial interpretation  and I agree with the findings.    TERRI ISSA MD         SYSTEM ID:  [id number]

## 2024-07-05 NOTE — PROGRESS NOTES
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  The patient was admitted on 24 for progressive hypoxia with dyspnea and worsening hypercapnia in ED requiring intubation likely d/t post-obstructive PNA 2/2 right bronchus stenosis.  Bronch confirming severe stenosis of right anastomosis with extensive RLL plugging.  IP bronch () with laser tissue debulking RMB stenosis, airway balloon dilation of RMB and BI, and placement of stent RMB to BI and bifurcating stent in RUL.  S/p volume resuscitation and transition to CRRT  for hypotension, stopped .  Increased agitation/delirium on  with increasing desaturation led to need for increased sedation.  Recurrence of paroxysmal afib with RVR first noted .  Remains intubated with pulmonary edema on imaging and septic shock requiring pressor support, limited/variable tolerance of PST.  She underwent Trach on 7/3/24. She is doing PST but requiring higher PST. GJ was placed on 2024.      Today's recommendations:  - Continue antimicrobial regimen per transplant ID  - Repeat CXR on Monday.   - Prospera (7/3) pending  - DSA (7/3) pending  - Tacrolimus level to be ordered   - Prednisone chronic dosing to resume if hydrocortisone <20 mg daily  - Bactrim MWF for PJP  ppx (monitor need to adjust pending HD plan)  - Awaiting susceptibilities on Aspergillus per transplant ID  - Agree with Tx ID to stop Linezolid/minocycline.  - CMV weekly monitoring (qTuesday), continue VGCV ppx (renally dosed, monitor need to adjust pending HD plan) with tentative plan for 3 weeks beyond completion of stress dose steroids      Septic shock:  Acute on chronic hypoxic/hypercapneic respiratory failure:  Post-obstructive multi-lobar PNA:  Right bronchial stenosis with RML collapse: Admitted with 2 weeks of progressive hypoxia, dyspnea, congested cough, and fatigue.  Chronic hypoxia with 2L NC, increased from 3 to 4L over this time with acute decompensation on day of admission associated with hypercapnia.  VBG 7/17/85 in ED s/p intubation.  IP bronch 2/15 s/p tissue debulking without stent placement.  Negative ID workup: respiratory panel, COVID, MRSA nares, PJP, Legionella, Strep pneumoniae, cocci, Histo, CrAg, fungitell x2, and A. galactomannan (blood).  IgG WNL.  Procal mildly elevated at 0.24 initially (then elevated to 1.77 6/20), LA normal, but febrile to 100.7.  TTE on admission grossly normal.  CT with RUL/RLL consolidative opacities, RML collapse, and narrowing of BI and distal RLL bronchus.  Started on norepi 6/19.  Bronch per MICU (6/19) with severe stenosis starting at right anastomosis, inspection with smaller scope revealing for extensive RLL mucous plugging.  Bronch per IP (6/20) with laser tissue debulking RMB stenosis, airway balloon dilation of RMB and BI, and placement of stent RMB to BI and bifurcating stent in RUL.  Intermittent/low grade fevers persisting.  Respiratory cultures with Steno, VSE, VRE, Aspergillus ochraceus, and Elidia guilliermondii.  Chest CT (6/29) with multifocal panlobar opacities and small bibasilar pleural effusions.  Remains on full vent support, limited/variable tolerance of PST, Trach'ed on 7/3/24 by ENT.  - The Etiology of GGO noted on Chest CT, BAL  7/3/24 with >80% mono/macros but cultures positive for Steno. She has grown Steno all her life. She has been aggressively dialyzed but there has been no improvement. Checking Immuknow today. If it is higher might support a trial of steroids.   DDx includes Rejection vs. .   She continues to struggle doing PST, but able to do 12/5 for 3.5hrs on 7/4/24.     - ABX: linezolid (6/25-7/5), and minocycline (6/20-7/5, Steno) per transplant ID; s/p ceftazidime (6/25-6/28), meropenem (6/21-6/24), daptomycin (6/21-6/24), levofloxacin (6/21-6/23), Zosyn (6/18-6/21), azithromycin 500 mg daily (6/18-6/20), and vancomycin (6/18)   - Nebs: levalbuterol QID, Mucomyst 10% BID alternating with 3% HTS (6/22), will need to be on NS nebs BID upon discharge per IP  - Vent management and weaning per MICU  - Stress dose steroids per MICU (started 6/21 - 7/6)  - Palliative care consultation appreciated.     S/p bilateral sequential lung transplant (BSLT) for ILD:   Progressive CLAD: Most recent OP visit in February.  DSA negative 6/19 (last positive noted 2018).  Repeat ImmuKnow (6/19) 87, very low but IST adjustment deferred per Dr. Graham given acuity and steroids (after ImmuKnow level).  Repeat Prospera remains high at 2.3 on 6/19, may be artificially elevated with current infection, but also notably high at 2.42 on 2/14.  - Repeat Prospera (7/3) pending  - Repeat DSA (7/3) pending  - Repeat ImmuKnow in 4-6 weeks  - PTA Singulair, azithromycin, and Breo inhaler (resume once extubated)     Immunosuppression:  On 2-drug IST since November d/t leukopenia and recurrent infections  - Tacrolimus 0.8 mg BID (increased 6/29).  Goal level 8-10.  Repeat level ordered 7/5.  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  - ABX and  infectious work up as above     Aspergillus ochraceus:  H/o Aspergillus empyema:  S/p empyema drainage and ampho B instillation.  Previously on voriconazole, transitioned to posaconazole and managed by transplant ID as OP with last visit 3/13.  Calcified fungal elements remain with additional recurrent effusion (likely associated with fluid disturbances r/t iHD), but surgical intervention previously deferred d/t risk.  Posaconazole level 2.5 on 6/19.  - Awaiting susceptibilities on Aspergillus (bronch culture 6/19) per transplant ID  - Posaconazole 300 mg daily chronically, micafungin 150 mg daily (6/25) per transplant ID     H/o CMV viremia: Recurrent CMV viremia historically.  VGCV ppx most recently stopped 4/12.  Recent CMV low positive at 46 (6/12), <35 (6/18), and 39 (6/25, 7/2).  - CMV weekly monitoring (qTuesday) with steroid increase  - VGCV ppx (renally dosed, monitor need to adjust pending HD plan) given stress dose steroids (6/24) with tentative plan for 3 weeks beyond completion of stress dose steroids      EBV viremia: S/p rituximab 12/13/23 x1 dose.  Most recent EBV negative 6/18.     Dilated CBD:  Suspected acute cholecystitis: MRCP (6/29) with CBD dilation with stones and cholelithiasis without cholecystitis.  GI signed off 6/30, recommending general surgery consult if RUQ pain persists.     ESRD on iHD: Kidney evaluation started as OP.  On qMWF iHD schedule, no missed sessions.  Transitioned to CRRT on 6/20, stopped 7/2.  Management per renal and MICU with fluid removal as able.      VRE urine culture: Noted 6/19, >100k GNB and VRE faecium, ABX as above with management per transplant ID and MICU    We appreciate the excellent care provided by the MICU team.  Recommendations communicated via in person rounding and this note.  Will continue to follow along closely, please do not hesitate to call with any questions or concerns.     Subjective & Interval History:     PS 12/5 40-50% x3hrs 38mins  "otherwise full vent support. Weight is below her dry weight.  Loose stools, no nausea.      Review of Systems:     ROS as above otherwise significantly limited due to intubation and sedation.     Physical Exam:     All notes, images, and labs from past 24 hours (at minimum) were reviewed.    Vital signs:  Temp: 97.1  F (36.2  C) Temp src: Axillary BP: 117/45 Pulse: 79   Resp: 28 SpO2: (!) 89 % O2 Device: Mechanical Ventilator Oxygen Delivery: 6 LPM Height: 160 cm (5' 3\") Weight: 49.6 kg (109 lb 5.6 oz)  I/O:     Intake/Output Summary (Last 24 hours) at 7/5/2024 1602  Last data filed at 7/5/2024 1600  Gross per 24 hour   Intake 2347 ml   Output --   Net 2347 ml     Constitutional: Sitting up in chair, in no apparent distress.   HEENT: Eyes with pink conjunctivae, anicteric.  Orally intubated via ETT.  Nasal FT.  PULM: Good air flow bilaterally.  Coarse crackles t/o.  No rhonchi, no wheezes.  Non-labored breathing on full vent support.  CV: Normal S1 and S2.  RRR.  No murmur, gallop, or rub.  No peripheral edema.   ABD: NABS, soft, nondistended.    MSK: Moves all extremities.  + muscle wasting.   NEURO: Opens eyes to voice, able to answer some yes/no questions by nodding/shaking head.  SKIN: Warm, dry, fragile.  Bilateral toes reddened.  PSYCH: Calm.     Data:     LABS    CMP:   Recent Labs   Lab 07/05/24  1157 07/05/24  0812 07/05/24  0342 07/05/24  0339 07/04/24  2023 07/04/24  1631 07/04/24  0340 07/04/24  0335 07/03/24  1614 07/03/24  1117 07/03/24  0429 07/03/24  0422 07/02/24  0409 07/02/24  0359 07/01/24  1640 07/01/24  1637 07/01/24  0353 07/01/24  0346 06/30/24  1537 06/30/24  1527 06/30/24  0338 06/30/24  0332 06/29/24  0405 06/29/24  0359   NA  --   --   --  131*  --  135  --  132*  --  134*  --  137  --  138  --  137  --  138  --  137  --  137   < > 138   POTASSIUM  --   --   --  3.5  --  3.1*  --  3.6  --  3.4  --  3.3*  --  3.6  --  3.8  --  3.6  --  3.8  --  3.9   < > 3.5   CHLORIDE  --   --   --  100  " --  101  --  103  --  103  --  105  --  108*  --  105  --  107  --  106  --  107   < > 107   CO2  --   --   --  23  --  23  --  18*  --  20*  --  21*  --  22  --  23  --  22  --  21*  --  21*   < > 22   ANIONGAP  --   --   --  8  --  11  --  11  --  11  --  11  --  8  --  9  --  9  --  10  --  9   < > 9   * 76 82 88   < > 164*   < > 143*   < > 128*   < > 154*   < > 161*   < > 155*   < > 170*   < > 173*   < > 192*   < > 199*   BUN  --   --   --  34.9*  --  26.4*  --  67.4*  --  54.2*  --  47.6*  --  28.2*  --  29.2*  --  28.1*  --  28.1*  --  29.0*   < > 28.2*   CR  --   --   --  1.57*  --  1.18*  --  2.30*  --  1.84*  --  1.61*  --  0.91  --  0.95  --  0.89  --  0.88  --  0.96*   < > 0.88   GFRESTIMATED  --   --   --  37*  --  52*  --  23*  --  31*  --  36*  --  71  --  68  --  73  --  74  --  67   < > 74   CHELSEY  --   --   --  7.8*  --  8.1*  --  7.7*  --  8.1*  --  8.0*  --  8.1*  --  7.6*  --  7.8*  --  7.9*  --  7.4*   < > 7.5*   MAG  --   --   --  2.0  --  1.9  --  2.6*  --   --   --  2.6*  --  2.5*  --  2.4*  --  2.5*  --  2.5*  --  2.4*   < > 2.4*   PHOS  --   --   --  3.4  --   --   --  7.0*  --   --   --  6.1*  --  4.8*  --  4.7*  --  4.7*  --  4.6*  --  4.8*   < > 3.9   PROTTOTAL  --   --   --   --   --   --   --   --   --   --   --   --   --  5.4*  --   --   --  5.0*  --   --   --  4.9*  --  4.7*   ALBUMIN  --   --   --   --   --   --   --   --   --   --   --   --   --  2.8*  --  2.7*  --  2.6*  --  2.7*  --  2.6*   < > 2.5*   BILITOTAL  --   --   --   --   --   --   --   --   --   --   --   --   --  0.6  --   --   --  0.6  --   --   --  0.8  --  0.8   ALKPHOS  --   --   --   --   --   --   --   --   --   --   --   --   --  176*  --   --   --  173*  --   --   --  178*  --  146   AST  --   --   --   --   --   --   --   --   --   --   --   --   --  26  --   --   --  25  --   --   --  26  --  22   ALT  --   --   --   --   --   --   --   --   --   --   --   --   --  37  --   --   --  36  --   --   --  38  " --  36    < > = values in this interval not displayed.     CBC:   Recent Labs   Lab 07/05/24  0339 07/04/24  0335 07/03/24  0422 07/02/24  0359   WBC 4.7 6.8 6.5 6.8   RBC 2.34* 2.65* 2.78* 2.90*   HGB 7.4* 8.4* 8.7* 9.0*   HCT 23.0* 26.4* 27.2* 28.3*   MCV 98 100 98 98   MCH 31.6 31.7 31.3 31.0   MCHC 32.2 31.8 32.0 31.8   RDW 18.8* 18.7* 18.4* 17.9*   PLT 70* 94* 94* 100*       INR: No lab results found in last 7 days.    Glucose:   Recent Labs   Lab 07/05/24  1157 07/05/24  0812 07/05/24  0342 07/05/24  0339 07/04/24  2321 07/04/24 2023   * 76 82 88 107* 146*       Blood Gas:   Recent Labs   Lab 06/30/24  1537   O2PER 45       Culture Data No results for input(s): \"CULT\" in the last 168 hours.    Virology Data:   Lab Results   Component Value Date    FLUAH1 Not Detected 06/18/2024    FLUAH3 Not Detected 06/18/2024    HT9402 Not Detected 06/18/2024    IFLUB Not Detected 06/18/2024    RSVA Not Detected 06/18/2024    RSVB Not Detected 06/18/2024    PIV1 Not Detected 06/18/2024    PIV2 Not Detected 06/18/2024    PIV3 Not Detected 06/18/2024    HMPV Not Detected 06/18/2024    HRVS Negative 01/24/2021    ADVBE Negative 01/24/2021    ADVC Negative 01/24/2021    ADVC Negative 12/23/2020    ADVC Negative 10/07/2019       Historical CMV results (last 3 of prior testing):  Lab Results   Component Value Date    CMVQNT <35 (A) 06/18/2024    CMVQNT Not Detected 03/05/2024    CMVQNT Not Detected 12/19/2023     Lab Results   Component Value Date    CMVLOG 1.6 07/02/2024    CMVLOG 1.6 06/25/2024    CMVLOG <1.5 06/18/2024       Urine Studies    Recent Labs   Lab Test 06/19/24  1214 01/24/21  1729   URINEPH 6.5 5.0   NITRITE Negative Negative   LEUKEST Large* Moderate*   WBCU >182* 34*       Most Recent [place] Pulmonary Function Testing (FVC/FEV1 only)  FVC-Pre   Date Value Ref Range Status   02/14/2024 0.85 L    12/19/2023 1.04 L    11/21/2023 0.85 L    10/24/2023 0.96 L      FVC-%Pred-Pre   Date Value Ref Range Status "   02/14/2024 29 %    12/19/2023 36 %    11/21/2023 29 %    10/24/2023 33 %      FEV1-Pre   Date Value Ref Range Status   02/14/2024 0.80 L    12/19/2023 0.89 L    11/21/2023 0.78 L    10/24/2023 0.87 L      FEV1-%Pred-Pre   Date Value Ref Range Status   02/14/2024 34 %    12/19/2023 38 %    11/21/2023 33 %    10/24/2023 37 %        IMAGING    Recent Results (from the past 48 hour(s))   XR Chest Port 1 View    Narrative    EXAM: XR CHEST PORT 1 VIEW  7/2/2024 3:44 PM      HISTORY: Lung tx, intubated. CT on 6/29 showed GGO    COMPARISON: CT 6/29/2024, 6/26/2024 radiograph    FINDINGS: Single view of the chest. An endotracheal tube terminates in  the midthoracic trachea. Postoperative changes in the chest and intact  median sternotomy wires. Enteric tube terminates in the stomach and  duodenum. Right IJ central venous catheter tip terminates at the  superior cavoatrial junction. Right bronchial stent is unchanged.   Trachea is midline. Cardiac silhouette is stable. Diffuse hazy opacity  throughout the left greater than right lungs, slightly improved  compared to 6/26/2024. No pleural effusion. No pneumothorax.      Impression    IMPRESSION:   1. Endotracheal tube terminates in the midthoracic trachea. Additional  support devices are stable.  2. Hazy opacities throughout the left greater the right lungs, overall  improved compared to radiograph dated 6/26/2024.    I have personally reviewed the examination and initial interpretation  and I agree with the findings.    TERRI ISSA MD         SYSTEM ID:  [id number]

## 2024-07-05 NOTE — PROGRESS NOTES
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 Nephrology consulted for management of HD while admitted.      Interval History :   Mrs Blue had CRRT stopped 7/2, ran HD yesterday with goal UF 2L, ended up pulling 1.4L as BP's were borderline suggesting she is at EDW.  Will hold on run today, ordered for tomorrow with goal again 1-2L of UF, new EDW ~49-50kg.     Was noted to have prolonged bleeding time after last run, checking fistula US to see if there is evidence of high pressures (has needed subclavian plasty in past, appears last intervention was 11/2023.  If US shows signs of stenosis we can consider fisulogram before discharge as she is likely to go to [facility] (now that she has trach) where it will be difficult to arrange outpt IR.      Assessment & Recommendations:   ESRD-ESKD: pt dialyzes MWF at [facility] (ph [phone], f [phone]) with Dr. Glasgow. Run time: 3.5 hrs. EDW 52.5 kg. Access: L AVF. +heparin 1,500u bolus.                  -Appreciate team placing tri-alysis line     -Plan for day off today, HD ordered for tomorrow, 3h/1-2L                -No need for new dialysis consent, continuing long term HD for ESRD.                -L AVF with remote hx of needing intervention, no issue recently.      Outpt Rx:       Volume-Below outpt EDW now at ~49kg, had to back off on UF yesterday, ended up pulling 1.4 (goal was 2L), suspect she is at EDW.       Pulm-Admitted with hypercapnic resp failure, suspected PNA. ID involved, getting bronch.  Currently on Zosyn, bactrim, Posaconazole, Azithromycin and Vanco x1 dose so far.      "  Electrolytes-Stable.     BMD-No acute issues.      Anemia-Hgb 7.4, last PRBC's 6/26 on venofer 50mg weekly but will hold while treating for infection. On Mircera 60mcg x5hbwkx, will cover with short term Epo while admitted when stable enough for HD.       Nutrition-Novasource renal      Time spent: 50 minutes on this date of encounter for chart review, physical exam, medical decision making and co-ordination of care.      Seen and discussed with Dr Barbosa     Recommendations were communicated to primary team via verbal communication.     ADRIÁN Lo CNS  Clinical Nurse Specialist  [phone]    Review of Systems:   I reviewed the following systems:  ROS not done due to vent/sedation    Physical Exam:   I/O last 3 completed shifts:  In: 2261 [I.V.:546; NG/GT:1265]  Out: 1400 [Other:1400]   /45   Pulse 70   Temp 98.1  F (36.7  C) (Oral)   Resp 28   Ht 1.6 m (5' 3\")   Wt 49.6 kg (109 lb 5.6 oz)   LMP 06/07/2014 (Exact Date)   SpO2 92%   BMI 19.37 kg/m       GENERAL APPEARANCE: Vent via trach   EYES: No scleral icterus  Pulmonary: Stable on vent  CV: Regular rhythm, normal rate   - Edema none  GI: soft, nontender, normal bowel sounds  MS: no evidence of inflammation in joints, no muscle tenderness  : No Basilio  SKIN: no rash, warm, dry  NEURO: No focal deficits    Labs:   All labs reviewed by me  Electrolytes/Renal -   Recent Labs   Lab Test 07/05/24  0812 07/05/24  0342 07/05/24  0339 07/04/24  2023 07/04/24  1631 07/04/24  0340 07/04/24  0335 07/03/24  0429 07/03/24  0422   NA  --   --  131*  --  135  --  132*   < > 137   POTASSIUM  --   --  3.5  --  3.1*  --  3.6   < > 3.3*   CHLORIDE  --   --  100  --  101  --  103   < > 105   CO2  --   --  23  --  23  --  18*   < > 21*   BUN  --   --  34.9*  --  26.4*  --  67.4*   < > 47.6*   CR  --   --  1.57*  --  1.18*  --  2.30*   < > 1.61*   GLC 76 82 88   < > 164*   < > 143*   < > 154*   CHELSEY  --   --  7.8*  --  8.1*  --  7.7*   < > 8.0*   MAG  --   " --  2.0  --  1.9  --  2.6*  --  2.6*   PHOS  --   --  3.4  --   --   --  7.0*  --  6.1*    < > = values in this interval not displayed.       CBC -   Recent Labs   Lab Test 07/05/24  0339 07/04/24  0335 07/03/24  0422   WBC 4.7 6.8 6.5   HGB 7.4* 8.4* 8.7*   PLT 70* 94* 94*       LFTs -   Recent Labs   Lab Test 07/02/24  0359 07/01/24  1637 07/01/24  0346 06/30/24  1527 06/30/24  0332   ALKPHOS 176*  --  173*  --  178*   BILITOTAL 0.6  --  0.6  --  0.8   ALT 37  --  36  --  38   AST 26  --  25  --  26   PROTTOTAL 5.4*  --  5.0*  --  4.9*   ALBUMIN 2.8* 2.7* 2.6*   < > 2.6*    < > = values in this interval not displayed.       Iron Panel -   Recent Labs   Lab Test 02/03/21  0415 12/13/18  1033 08/01/18  0921   IRON 51 16* 93   IRONSAT 36 7* 37   YOLA  --  302* 571*           Current Medications:  Current Facility-Administered Medications   Medication Dose Route Frequency Provider Last Rate Last Admin    acetaminophen (TYLENOL) tablet 325 mg  325 mg Oral or Feeding Tube Q4H Jailyn Lou MD   325 mg at 07/05/24 0759    acetylcysteine (MUCOMYST) 10 % nebulizer solution 4 mL  4 mL Inhalation BID Velez Reyes, German, MD   4 mL at 07/05/24 1019    azithromycin (ZITHROMAX) tablet 250 mg  250 mg Oral or Feeding Tube Daily Velez Reyes, German, MD   250 mg at 07/05/24 0759    calcium carbonate (TUMS) chewable tablet 500 mg  500 mg Oral Once per day on Sunday Tuesday Thursday Saturday Josesito Pratt MD   500 mg at 07/02/24 0812    chlorhexidine (PERIDEX) 0.12 % solution 15 mL  15 mL Mouth/Throat Q12H Chio Cortez MD   15 mL at 07/05/24 0809    epoetin alisha-epbx (RETACRIT) injection 4,000 Units  4,000 Units Intravenous Once per day on Monday Wednesday Friday Velez Reyes, German, MD   4,000 Units at 07/03/24 0833    fiber modular (NUTRISOURCE FIBER) packet 1 packet  1 packet Per Feeding Tube Q8H Awa Watt MD   1 packet at 07/04/24 2309    heparin ANTICOAGULANT injection 5,000 Units  5,000 Units  Subcutaneous Q8H Rossana Sarabia APRN CNP        insulin aspart (NovoLOG) injection (RAPID ACTING)  1-12 Units Subcutaneous Q4H Edin Davis MD   1 Units at 07/04/24 2023    insulin NPH injection 8 Units  8 Units Subcutaneous BID Velez Reyes, German, MD   8 Units at 07/04/24 2020    levalbuterol (XOPENEX) neb solution 0.63 mg  0.63 mg Nebulization 4x Daily Gareth Mosquera MD   0.63 mg at 07/05/24 1018    linezolid (ZYVOX) tablet 600 mg  600 mg Oral or Feeding Tube Q12H Jailyn Lou MD   600 mg at 07/04/24 2308    [Held by provider] magnesium chloride CR tablet 1,070 mg  1,070 mg Oral Once per day on Sunday Tuesday Thursday Saturday Josesito Pratt MD        metoprolol tartrate (LOPRESSOR) tablet 25 mg  25 mg Oral BID Velez Reyes, German, MD   25 mg at 07/05/24 0759    micafungin (MYCAMINE) 150 mg in sodium chloride 0.9 % 100 mL intermittent infusion  150 mg Intravenous Q24H Chio Cortez  mL/hr at 07/04/24 2306 150 mg at 07/04/24 2306    midodrine (PROAMATINE) tablet 20 mg  20 mg Oral or Feeding Tube Q8H BMT Rossana Sarabia APRN CNP   20 mg at 07/05/24 0618    minocycline (MINOCIN) 100 mg in sodium chloride 0.9 % 100 mL intermittent infusion  100 mg Intravenous Q12H Jailyn Lou  mL/hr at 07/04/24 2304 100 mg at 07/04/24 2304    montelukast (SINGULAIR) tablet 10 mg  10 mg Oral At Bedtime Velez Reyes, German, MD   10 mg at 07/04/24 2221    multivitamin RENAL (RENAVITE RX/NEPHROVITE) tablet 1 tablet  1 tablet Oral or Feeding Tube Daily Awa Watt MD   1 tablet at 07/05/24 0759    pantoprazole (PROTONIX) 2 mg/mL suspension 40 mg  40 mg Per Feeding Tube Daily Randi James MD        posaconazole (NOXAFIL) DR tablet TBEC 300 mg  300 mg Per Feeding Tube Daily Josesito Pratt MD   300 mg at 07/04/24 1241    predniSONE (DELTASONE) tablet 5 mg  5 mg Oral Daily Rossana Sarabia APRN CNP   5 mg at 06/21/24 0822    protein modular (PROSOURCE TF20)  packet 1 packet  1 packet Per Feeding Tube Daily Edin Davis MD   1 packet at 07/04/24 0826    QUEtiapine (SEROquel) tablet 50 mg  50 mg Oral or Feeding Tube At Bedtime Kajal Chong APRN CNP   50 mg at 07/04/24 2221    ramelteon (ROZEREM) tablet 8 mg  8 mg Oral At Bedtime Velez Reyes, German, MD   8 mg at 07/04/24 2221    sodium chloride (NEBUSAL) 3 % neb solution 3 mL  3 mL Nebulization BID Velez Reyes, German, MD   3 mL at 07/04/24 2054    sodium chloride (PF) 0.9% PF flush 10 mL  10 mL Intracatheter Q8H Kajal Chong APRN CNP   10 mL at 07/05/24 0813    sodium chloride (PF) 0.9% PF flush 10 mL  10 mL Intracatheter Q8H Kajal Chong APRN CNP   10 mL at 07/05/24 0813    sulfamethoxazole-trimethoprim (BACTRIM) 400-80 MG per tablet 1 tablet  1 tablet Oral or Feeding Tube Once per day on Monday Wednesday Friday Randi James MD   1 tablet at 07/03/24 2139    tacrolimus (GENERIC) suspension 0.8 mg  0.8 mg Oral or Feeding Tube BID IS Yonny Orona MD   0.8 mg at 07/05/24 0803    traZODone (DESYREL) half-tab 25 mg  25 mg Per Feeding Tube At Bedtime Jailyn Lou MD   25 mg at 07/04/24 2221    valGANciclovir (VALCYTE) solution 450 mg  450 mg Per Feeding Tube Once per day on Monday Friday Randi James MD        Vitamin D3 (CHOLECALCIFEROL) tablet 50 mcg  50 mcg Oral Once per day on Monday Wednesday Friday Velez Reyes, German, MD   50 mcg at 07/03/24 0824     Current Facility-Administered Medications   Medication Dose Route Frequency Provider Last Rate Last Admin    dextrose 10% infusion   Intravenous Continuous PRN Velez Reyes, German, MD   Stopped at 07/03/24 1600    dextrose 10% infusion   Intravenous Continuous PRN Awa Watt MD 40 mL/hr at 07/05/24 0900 Rate Verify at 07/05/24 0900

## 2024-07-05 NOTE — PRE-PROCEDURE
GENERAL PRE-PROCEDURE:   Procedure:  Gastrojejunostomy tube placement  Date/Time:  7/5/2024 8:40 AM    Written consent obtained?: Yes    Risks and benefits: Risks, benefits and alternatives were discussed    Consent given by:  Patient  Patient states understanding of procedure being performed: Yes    Patient's understanding of procedure matches consent: Yes    Procedure consent matches procedure scheduled: Yes    Expected level of sedation:  Moderate  Appropriately NPO:  Yes  ASA Class:  2  Mallampati  :  N/A- Alternate secured airway  Lungs:  Other (comment)  Lung exam comment:  Intubated with coarse breath sounds bilaterally  Heart:  Normal heart sounds and rate  History & Physical reviewed:  History and physical reviewed and no updates needed  Statement of review:  I have reviewed the lab findings, diagnostic data, medications, and the plan for sedation

## 2024-07-05 NOTE — PROGRESS NOTES
ICU End of Shift Summary. See flowsheets for vital signs and detailed assessment.    Changes this shift: RASS 0 to -1. Denies pain. Follows commands and makes needs known. CMV 50%, 320, 18, 5. Trach remains sutured with bloody drainage at site. Maintianing MAP goal >65 without pressors. BM x1. Aneuric. NPO @ 0000 for PEG-J. Oral contrast given.    Plan: Plan for PEG-J in IR. Continue plan of care.

## 2024-07-05 NOTE — CONSULTS
"    Interventional Radiology Consult Service Note    IR consulted for possible fistulogram to evaluate patency of dialysis fistula.    This is a 63 year old female s/p B lung tx for ILD 2018, COPD, ESRD on HD from tacro toxicity, in MICU for hypoxic hypercapneic resp failure-trach to vent, was on CRRT, now switched back to iHD yesterday via LUE brachiocephalic access. ICU reports single episode of prolonged bleeding after iHD 7/4. IR is consulted for fistulogram to assess patency. Last IR intervention was 3/5/24 for similar complaint.    Case and imaging was reviewed with Dr. Mckeon from IR. Recommend US of dialysis access to evaluate patency. Single episode of prolonged bleeding does not necessitate IR fistulogram or intervention.    Recommendations were reviewed with SCOTT Escobar. Will follow-up after US is completed and nephrology reports they will attempt iHD again via the access.    Vitals:   /45   Pulse 69   Temp 98.1  F (36.7  C) (Oral)   Resp 19   Ht 1.6 m (5' 3\")   Wt 49.6 kg (109 lb 5.6 oz)   LMP 06/07/2014 (Exact Date)   SpO2 92%   BMI 19.37 kg/m      Pertinent Labs:     Lab Results   Component Value Date    WBC 4.7 07/05/2024    WBC 6.8 07/04/2024    WBC 6.5 07/03/2024    WBC 7.5 05/02/2021    WBC 6.8 04/30/2021    WBC 6.3 04/29/2021       Lab Results   Component Value Date    HGB 7.4 07/05/2024    HGB 8.4 07/04/2024    HGB 8.7 07/03/2024    HGB 10.6 05/02/2021    HGB 10.9 04/30/2021    HGB 11.1 04/29/2021       Lab Results   Component Value Date    PLT 70 07/05/2024    PLT 94 07/04/2024    PLT 94 07/03/2024     05/02/2021     04/30/2021     04/29/2021       Lab Results   Component Value Date    INR 0.98 06/18/2024    INR 0.9 08/16/2023    INR 0.9 08/16/2023    INR 0.99 04/29/2021    PTT 28 02/12/2021       Lab Results   Component Value Date    POTASSIUM 3.5 07/05/2024    POTASSIUM 3.8 10/17/2023    POTASSIUM 3.5 05/26/2022    POTASSIUM 4.3 05/07/2021        Randi" ADRIÁN Sánchez CNP  Interventional Radiology   Pager: [phone]

## 2024-07-05 NOTE — PROGRESS NOTES
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 Dr. Kay will be covering for the weekend (7/6 and 7/7) and will not plan on seeing Kecia unless called. I will resume her care again on Monday (7/8).     Attestation:  Total duration of visit including chart review, reviewing labs and imaging, interviewing and examining the patient, documentation, and sending communication to the primary treating team, all at the same day of this encounter, is: 52 minutes.       Lissett Lewis MD  Transplant Infectious Diseases Attending  [phone]          Synopsis of Immune Status and Presentation:   Transplant Summary  Transplants:  3/1/2018 (Lung); POD  2314.  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 Found to hae mucous plugs and R-sided bronchial stenosis on BAL (6/19/24). Underwent R sided stent placement with purulent discharge noted. She developed ongoing septic shock and respiratory failure with mucous plugs on BAL.         Active Problems and Infectious Diseases Issues:   Acute hypercapnic and hypoxic respiratory failure  Shock, resolved  Bilateral multilobar pneumonia  R mainstem bronchial stenosis s/p dilation (2/15/24 and 6/20/24) with stent placement   (6/20/24)  BAL cultures growing VSE faecalis, VRE faecium, Stenotrophomonas maltophilia, and C guillermondii   BAL cultures with VSE faecalis, VRE faecium, Stenotrophomonas, and C guillermondi. Note that the Stenotrophomonas has been a long-term colonizer (dates back to 9/2019). The Enterococcus is a pretty uncommon cause of invasive pneumonia, as is the C. Guillermondii.  However, given her shock and hypoxic respiratory failure, we have opted to treat all of these pathogens. There is some thought that her dilation on 6/20/24 may have led to some worsening infection. She is also on a hydrocortisone taper. Will complete a 14 day course of minocycline for Stenotrophomonas (Dates: 6/20-7/5). Although it is much less likely to be a cause of invasive pneumonia, we will complete a 10-day course of linezolid for possible VRE pneumonia (Dates: 6/25-7/5).            Pyuria  Urine cultures with E coli and VRE faecium (6/19/24)  Completed 7 days antibiotics for E coli (6/21-6/28). VRE faecium started receiving adequate treatment on 6/21/24 with Linezolid. No need for additional antibiotics for bacteruria once pneumonia has been treated (see above).            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 Has been on treatment with voriconazole ([id number])-->posaconazole 2020-current. 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 This is an uncommon cause of invasive disease, but have opted to treat given tenuous status and positive BAL galactomannan. Interestingly, repeat BAL on 6/21 with negative galactomannan and no growth. Blood galactomannan (6/24) negative. At this point, has remained on posaconazole (trough 2.5 on 6/19). Was started on micafungin on 6/25/24. Since Aspergillus ocharaceus can have more variable susceptibilities to advanced aozles, will await susceptibility testing results to determine final treatment plan.     C guilliermondii complex from respiratory cultures (6/19 and 6/24)   Grew on BAL cultures from 6/19 and ET tube cultures from 6/24. Unlikely to be a true pathogen. Initially grew from BAL on 6/19 and again on ET tube cultures from 6/24. At this point, she is on micafungin for coverage since 6/25 for Aspergillus, which will cover. She has not grown this on subsequent BALs    CMV Viremia  Low-grade viremia ranging from just detected to 42 international units starting on 5/1/2024. Remains on prophylactic valvanciclovir since 6/24/24, given steroid use. Doing weekly CMV VL. Last CMV VL was 39 (7/2).     Transplant Checklist  - QTc interval: 438 (6/18/24)  - Pneumocystis prophylaxis: Bactrim Single Strength daily for life (h/o PJP)   - Bacterial prophylaxis:  Azithromycin  - Fungal prophylaxis: Posaconazole long-term, given Aspergillus fumigatus empyema.   - Serostatus & viral prophylaxis: CMV D+/R+, EBV D+/R+   - Immunization status:  Needs Spring COVID-19 booster, PCV-20  - Gamma globulin status:   Recent Labs   Lab Test 06/19/24  0839      - Antibiotic allergies: Vancomycin: erythroderma  with itching. Will need premedication with Benadryl prior to giving.         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 Decreased to 135k 2/2022. Neg 6/27/22. Increased to 1 million in 11/2023 s/p 1 dose of rituximab and decreasing/undetected in 05/2024. Negative EBV 6/19/24.  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 BAL showed septate hyphae. 7/2020 had hypodense mass in left lung with biopsy showing fungal hyphae, cx aspergillus. Started on voriconazole in 2019, changed to posaconazole in 2020, she remains on 300 mg daily.  1/2021 BAL cx with steno: tx ceftazidime for 2 weeks.  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 Recovery from this required tracheostomy.  2019 - She had also grown Actinomyces from multiple BAL         Interval Events    Since I last saw her on 7/2/24, she has been doing well. She underwent trach on 7/3/24 and PEG today (7/5/24). She has no specific complaints today. In discussion with pulmonary, they are deciding whether or not to give higher dose steroids.           Immunizations:     Immunization History   Administered Date(s) Administered    COVID-19 Bivalent 12+ (Pfizer) 11/18/2022    COVID-19 MONOVALENT 12+ (Pfizer) 03/10/2021, 03/30/2021, 10/12/2021    Hep B, Adult (Heplisav- B) 06/04/2020, 07/04/2020, 08/05/2020, 12/07/2020, 06/15/2022, 07/20/2022, 08/17/2022, 01/18/2023, 07/19/2023, 08/16/2023    HepB, Unspecified 06/04/2020, 07/04/2020, 08/05/2020, 12/07/2020    Hepatitis B, Adult 06/04/2020, 07/04/2020, 08/05/2020, 12/07/2020    Influenza Vaccine 18-64 (Flublok) 10/09/2019    Influenza Vaccine 65+ (Fluzone HD) 02/24/2016    Influenza Vaccine >6 months,quad, PF 10/22/2014, 10/26/2015, 02/24/2016, 11/29/2016, 12/01/2017, 10/29/2018, 10/12/2020, 10/03/2022    Influenza  Vaccine IM Ages 6-35 Months 4 Valent (PF) 02/24/2016    Influenza, seasonal, injectable, PF 02/24/2016    Mantoux Tuberculin Skin Test 10/30/2019, 11/07/2019    Pneumo Conj 13-V (2010&after) 02/24/2016    Pneumococcal 23 valent 11/20/2014, 12/18/2019    Pneumococcal, Unspecified 02/24/2016    TD,PF 7+ (Tenivac) 03/11/1996    TDAP (Adacel,Boostrix) 02/04/2008    TDAP Vaccine (Boostrix) 12/13/2018    Td (Adult), Adsorbed 03/11/1996    Tdap (Adult) Unspecified Formulation 02/04/2008    Zoster recombinant adjuvanted (SHINGRIX) 03/06/2019, 06/05/2019            Allergies:     Allergies   Allergen Reactions    Isopropyl Alcohol Other (See Comments)     The alcohol burns the open areas on her skin when used as a skin prep for procedures    Vancomycin Other (See Comments)     Diffuse erythroderma with itching (improved with Benadryl) after receiving vancomycin over 1 hour. 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 Can use vancomycin in the future, but please give over slower infusion time (at least 2 hours) and premedicate with Benadryl.             Medications:   Medications that Require Transfusion:   Current Facility-Administered Medications   Medication Dose Route Frequency Provider Last Rate Last Admin    dextrose 10% infusion   Intravenous Continuous PRN Velez Reyes, German, MD   Stopped at 07/03/24 1600    dextrose 10% infusion   Intravenous Continuous PRN Awa Watt MD 40 mL/hr at 07/05/24 0900 Rate Verify at 07/05/24 0900     Scheduled Medications:   Current Facility-Administered Medications   Medication Dose Route Frequency Provider Last Rate Last Admin    acetaminophen (TYLENOL) tablet 325 mg  325 mg Oral or Feeding Tube Q4H Jailyn Lou MD   325 mg at 07/05/24 0759    acetylcysteine (MUCOMYST) 10 % nebulizer solution 4 mL  4 mL Inhalation BID Velez Reyes, German, MD   4 mL at 07/05/24 1019    azithromycin (ZITHROMAX)  tablet 250 mg  250 mg Oral or Feeding Tube Daily Velez Reyes, German, MD   250 mg at 07/05/24 0759    calcium carbonate (TUMS) chewable tablet 500 mg  500 mg Oral Once per day on Sunday Tuesday Thursday Saturday Josesito Pratt MD   500 mg at 07/02/24 0812    chlorhexidine (PERIDEX) 0.12 % solution 15 mL  15 mL Mouth/Throat Q12H Chio Cortez MD   15 mL at 07/05/24 0809    epoetin alisha-epbx (RETACRIT) injection 4,000 Units  4,000 Units Intravenous Once per day on Monday Wednesday Friday Velez Reyes, German, MD   4,000 Units at 07/05/24 1150    fiber modular (NUTRISOURCE FIBER) packet 1 packet  1 packet Per Feeding Tube Q8H Awa Watt MD   1 packet at 07/04/24 2309    heparin ANTICOAGULANT injection 5,000 Units  5,000 Units Subcutaneous Q8H Rossana Sarabia APRN CNP        insulin aspart (NovoLOG) injection (RAPID ACTING)  1-12 Units Subcutaneous Q4H Edin Davis MD   1 Units at 07/04/24 2023    insulin NPH injection 8 Units  8 Units Subcutaneous BID Velez Reyes, German, MD   8 Units at 07/04/24 2020    levalbuterol (XOPENEX) neb solution 0.63 mg  0.63 mg Nebulization 4x Daily Gareth Mosquera MD   0.63 mg at 07/05/24 1018    linezolid (ZYVOX) tablet 600 mg  600 mg Oral or Feeding Tube Q12H Jailyn Lou MD   600 mg at 07/04/24 2308    [Held by provider] magnesium chloride CR tablet 1,070 mg  1,070 mg Oral Once per day on Sunday Tuesday Thursday Saturday Josesito Pratt MD        metoprolol tartrate (LOPRESSOR) tablet 25 mg  25 mg Oral BID Velez Reyes, German, MD   25 mg at 07/05/24 0759    micafungin (MYCAMINE) 150 mg in sodium chloride 0.9 % 100 mL intermittent infusion  150 mg Intravenous Q24H Chio Cortez  mL/hr at 07/04/24 2306 150 mg at 07/04/24 2306    midodrine (PROAMATINE) tablet 20 mg  20 mg Oral or Feeding Tube Q8H BMT Rossana Sarabia APRN CNP   20 mg at 07/05/24 0618    minocycline (MINOCIN) 100 mg in sodium chloride 0.9 % 100 mL intermittent  infusion  100 mg Intravenous Q12H Jailyn Lou  mL/hr at 07/04/24 2304 100 mg at 07/04/24 2304    montelukast (SINGULAIR) tablet 10 mg  10 mg Oral At Bedtime Velez Reyes, German, MD   10 mg at 07/04/24 2221    multivitamin RENAL (RENAVITE RX/NEPHROVITE) tablet 1 tablet  1 tablet Oral or Feeding Tube Daily Awa Watt MD   1 tablet at 07/05/24 0759    pantoprazole (PROTONIX) 2 mg/mL suspension 40 mg  40 mg Per Feeding Tube Daily Randi James MD        posaconazole (NOXAFIL) DR tablet TBEC 300 mg  300 mg Per Feeding Tube Daily Josesito Pratt MD   300 mg at 07/04/24 1241    predniSONE (DELTASONE) tablet 5 mg  5 mg Oral Daily Rossana Sarabia APRN CNP   5 mg at 06/21/24 0822    protein modular (PROSOURCE TF20) packet 1 packet  1 packet Per Feeding Tube Daily Edin Davis MD   1 packet at 07/04/24 0826    QUEtiapine (SEROquel) tablet 50 mg  50 mg Oral or Feeding Tube At Bedtime Kajal Chong APRN CNP   50 mg at 07/04/24 2221    ramelteon (ROZEREM) tablet 8 mg  8 mg Oral At Bedtime Velez Reyes, German, MD   8 mg at 07/04/24 2221    sodium chloride (NEBUSAL) 3 % neb solution 3 mL  3 mL Nebulization BID Velez Reyes, German, MD   3 mL at 07/04/24 2054    sodium chloride (PF) 0.9% PF flush 10 mL  10 mL Intracatheter Q8H Kajal Chong APRN CNP   10 mL at 07/05/24 0813    sodium chloride (PF) 0.9% PF flush 10 mL  10 mL Intracatheter Q8H Kajal Chong APRN CNP   10 mL at 07/05/24 0813    sulfamethoxazole-trimethoprim (BACTRIM) 400-80 MG per tablet 1 tablet  1 tablet Oral or Feeding Tube Once per day on Monday Wednesday Friday Randi James MD   1 tablet at 07/03/24 2139    tacrolimus (GENERIC) suspension 0.8 mg  0.8 mg Oral or Feeding Tube BID IS Yonny Orona MD   0.8 mg at 07/05/24 0803    traZODone (DESYREL) half-tab 25 mg  25 mg Per Feeding Tube At Bedtime Jailyn Lou MD   25 mg at 07/04/24 2221    valGANciclovir (VALCYTE) solution 450 mg  450 mg Per  "Feeding Tube Once per day on Monday Friday Randi James MD        Vitamin D3 (CHOLECALCIFEROL) tablet 50 mcg  50 mcg Oral Once per day on Monday Wednesday Friday Velez Reyes, German, MD   50 mcg at 07/03/24 0824               Physical Exam:   Temp: 98.1  F (36.7  C) Temp src: Oral BP: 117/45 Pulse: 70   Resp: 28 SpO2: 92 % O2 Device: Mechanical Ventilator      Wt Readings from Last 4 Encounters:   07/05/24 49.6 kg (109 lb 5.6 oz)   05/16/24 54.4 kg (120 lb)   03/13/24 55.3 kg (122 lb)   03/05/24 57.7 kg (127 lb 4.8 oz)     Constitutional: awake, alert. Trach in place today.   Head, ENT, Eyes, and Neck: Normocephalic, trach in place with some blood n dressing.   Neck supple without rigidity.   Neurologic: Patient is moving all extremities without focal deficit, no focal sensory loss.   Lungs: Breathing comfortably on ventilator through trach today. No increased work of breathing or accessory muscle use.   CVS: Appears well-perfused.            Data:   No results found for: \"ACD4\"    Inflammatory Markers    Recent Labs   Lab Test 06/18/24  1418 10/29/23  0642 10/28/23  0725 10/07/19  1221 10/06/19  1444 09/13/19  0934 09/11/19  2325 08/22/19  0820   SED 10 66* 58* 93*  --  111* 102*  --    CRP  --   --   --   --  25.0* 58.0* 66.0* 47.0*       Immune Globulin Studies     Recent Labs   Lab Test 06/19/24  0839 10/27/23  0701 10/24/23  1033 09/15/21  0915 01/31/21  0441 01/25/21  0406 10/27/19  0621 03/01/18  0356 02/19/18  0759    979  --  1,198 763  --  936 698 1,790*   IGM  --   --   --   --   --   --   --   --  502*   IGE  --   --  <2  --   --  <2  --   --  <2   IGA  --   --   --   --   --   --   --   --  425*   IGG1  --   --   --   --   --   --   --   --  1,300*   IGG2  --   --   --   --   --   --   --   --  131*   IGG3  --   --   --   --   --   --   --   --  101   IGG4  --   --   --   --   --   --   --   --  1*       Metabolic Studies       Recent Labs   Lab Test 07/05/24  0812 07/05/24  0342 " 07/05/24  0339 07/04/24  2321 07/04/24 2023 07/04/24  1631 07/04/24  0340 07/04/24  0335 07/03/24  1614 07/03/24  1117 07/03/24  0429 07/03/24  0422 07/02/24  0409 07/02/24  0359 07/01/24  0353 07/01/24  0346 06/25/24  0350 06/25/24  0348 06/24/24  1942 06/24/24  1816 06/24/24  0013 06/23/24 2006   NA  --   --  131*  --   --  135  --  132*  --  134*  --  137  --  138   < > 138   < > 136  --   --    < >  --    POTASSIUM  --   --  3.5  --   --  3.1*  --  3.6  --  3.4  --  3.3*  --  3.6   < > 3.6   < > 4.1  --   --    < >  --    CHLORIDE  --   --  100  --   --  101  --  103  --  103  --  105  --  108*   < > 107   < > 105  --   --    < >  --    CO2  --   --  23  --   --  23  --  18*  --  20*  --  21*  --  22   < > 22   < > 22  --   --    < >  --    ANIONGAP  --   --  8  --   --  11  --  11  --  11  --  11  --  8   < > 9   < > 9  --   --    < >  --    BUN  --   --  34.9*  --   --  26.4*  --  67.4*  --  54.2*  --  47.6*  --  28.2*   < > 28.1*   < > 40.7*  --   --    < >  --    CR  --   --  1.57*  --   --  1.18*  --  2.30*  --  1.84*  --  1.61*  --  0.91   < > 0.89   < > 1.02*  --   --    < >  --    GFRESTIMATED  --   --  37*  --   --  52*  --  23*  --  31*  --  36*  --  71   < > 73   < > 62  --   --    < >  --    GLC 76 82 88 107* 146* 164*   < > 143*   < > 128*   < > 154*   < > 161*   < > 170*   < > 279*   < >  --    < >  --    A1C  --   --   --   --   --   --   --   --   --   --   --   --   --   --   --   --   --  5.1  --   --   --   --    CHELSEY  --   --  7.8*  --   --  8.1*  --  7.7*  --  8.1*  --  8.0*  --  8.1*   < > 7.8*   < > 8.0*  --   --    < >  --    PHOS  --   --  3.4  --   --   --   --  7.0*  --   --   --  6.1*  --  4.8*   < > 4.7*   < > 3.9  --   --    < >  --    MAG  --   --  2.0  --   --  1.9  --  2.6*  --   --   --  2.6*  --  2.5*   < > 2.5*   < > 2.4*  --   --    < >  --    LACT  --   --   --   --   --   --   --   --   --   --   --   --   --   --   --   --   --   --   --  0.9  --  0.9   CKT  --   --   --    --   --   --   --  49  --   --   --   --   --   --   --  26   < >  --   --   --    < >  --     < > = values in this interval not displayed.       Hepatic Studies    Recent Labs   Lab Test 07/02/24  0359 07/01/24  1637 07/01/24  0346 06/30/24  1527 06/30/24  0332 06/29/24  1604 06/29/24  0359 06/28/24  1646 06/28/24  0427 06/27/24  1636 06/27/24  0348 06/19/24  0613 06/18/24  1803 09/15/21  0915 05/01/21  0654 01/27/21  0414 01/26/21 0425   BILITOTAL 0.6  --  0.6  --  0.8  --  0.8  --  0.7  --  1.1   < > 0.6   < >  --    < > 0.8   ALKPHOS 176*  --  173*  --  178*  --  146  --  122  --  135   < > 235*   < >  --    < > 184*   ALBUMIN 2.8* 2.7* 2.6* 2.7* 2.6* 2.5* 2.5*   < > 2.3*   < > 2.4*   < > 3.6   < >  --    < > 2.0*   AST 26  --  25  --  26  --  22  --  16  --  20   < > 39   < >  --    < > 11   ALT 37  --  36  --  38  --  36  --  32  --  38   < > 39   < >  --    < > 30   LDH  --   --   --   --   --   --   --   --   --   --   --   --   --   --  164  --  187   GGT  --   --   --   --   --   --   --   --   --   --   --   --  147*  --   --   --   --     < > = values in this interval not displayed.       Pancreatitis testing    Recent Labs   Lab Test 06/27/24  1636 06/18/24  1803 10/25/23  1356 05/26/22  0750 09/15/21  0915 01/24/21  0000 02/19/20  0713 12/31/19  1033 10/24/19  2032 10/21/19  1451 10/06/19  1444 09/11/19  2325 03/04/18  0353 02/19/18  0759   AMYLASE  --   --   --   --   --   --   --   --   --   --   --   --   --  58   LIPASE 39 40 24  --   --   --   --   --  212 119 172 127  --   --    TRIG  --   --   --  155* 68 242* 77 98  --   --   --   --  191* 115       Hematology Studies      Recent Labs   Lab Test 07/05/24  0339 07/04/24  0335 07/03/24  0422 07/02/24  0359 07/01/24  0346 06/30/24  0332 06/29/24  0359 06/28/24  0427 06/27/24  0348 06/26/24  1200 06/26/24  0310 06/25/24  0348 06/24/24  0335   WBC 4.7 6.8 6.5 6.8 7.8 6.3 4.8 4.4 5.2  --  3.0* 3.0* 3.6*   ANEU  --   --   --   --   --   --  4.2 3.3  "3.2  --  1.7 1.4* 2.8   ALYM  --   --   --   --   --   --  0.3* 0.8 1.6  --  1.1 1.1 0.3*   SHERRI  --   --   --   --   --   --  0.2 0.2 0.4  --  0.2 0.3 0.4   AEOS  --   --   --   --   --   --  0.0 0.0 0.0  --  0.0 0.0 0.0   HGB 7.4* 8.4* 8.7* 9.0* 9.0* 8.9* 7.8* 8.0* 8.4*   < > 6.9* 7.0* 7.5*   HCT 23.0* 26.4* 27.2* 28.3* 29.4* 29.1* 25.1* 25.6* 27.1*  --  23.1* 23.0* 23.7*   PLT 70* 94* 94* 100* 131* 126* 111* 98* 112*  --  86* 70* 69*    < > = values in this interval not displayed.       Clotting Studies    Recent Labs   Lab Test 06/18/24  1418 03/05/24  0925 02/14/24  1050 10/24/23  1033 02/19/21  0458 02/12/21  0315 08/04/18  0709 08/03/18  0624 06/19/18  0000 06/11/18  0925 03/12/18  1123 03/05/18  1648   INR 0.98 0.98 0.96 0.99   < >  --    < > 1.09   < > 0.92   < > 1.11   PTT  --   --   --   --   --  28  --  26  --  26  --  24    < > = values in this interval not displayed.       Arterial Blood Gas Testing    Recent Labs   Lab Test 06/30/24  1537 06/26/24  0310 06/25/24  1209 06/24/24  2126 06/24/24  1653   PH 7.38 7.35 7.29* 7.33* 7.30*   PCO2 41 44 51* 45 50*   PO2 154* 137* 60* 133* 80   HCO3 24 24 24 24 25   O2PER 45 50 50 60 60        Urine Studies     Recent Labs   Lab Test 06/19/24  1214 01/24/21  1729 10/21/19  2240 09/12/19  0125 08/07/19  1512   URINEPH 6.5 5.0 5.0 7.5* 7.0   NITRITE Negative Negative Negative Negative Negative   LEUKEST Large* Moderate* Large* Negative Trace*   WBCU >182* 34* 115* 3 19*       Vancomycin Levels     Recent Labs   Lab Test 06/19/24  0613 10/26/23  0606 09/21/21  1911 01/28/21  0412 01/26/21  0425 01/25/21  1259   VANCOMYCIN 15.6 18.6 18.8 21.6 31.4* 15.1       Tobramycin levels     No lab results found.    Gentamicin levels    No lab results found.    Tacrolimus levels    Invalid input(s): \"TACROLIMUS\", \"TAC\", \"TACR\"      Latest Ref Rng & Units 7/5/2024     3:39 AM 7/4/2024     4:31 PM 7/4/2024     3:35 AM 7/3/2024    11:17 AM 7/3/2024     4:22 AM   Transplant " "Immunosuppression Labs   Creat 0.51 - 0.95 mg/dL 1.57  1.18  2.30  1.84  1.61    Urea Nitrogen 8.0 - 23.0 mg/dL 34.9  26.4  67.4  54.2  47.6    WBC 4.0 - 11.0 10e3/uL 4.7   6.8   6.5    Neutrophil % 62   75   72        Cyclosporine levels    Invalid input(s): \"CYCLOSPORINE\", \"CYC\"    Mycophenolate levels    Invalid input(s): \"MYPA\", \"MYP\"    Sirolimus levels    Invalid input(s): \"SIROLIMUS\", \"SIR\", \"RAPA\"    CSF testing   No lab results found.      Last check of C difficile  C Diff Toxin B PCR   Date Value Ref Range Status   02/13/2021 Negative NEG^Negative Final     Comment:     Negative: C. difficile target DNA sequences NOT detected, presumed negative   for C.difficile toxin B or the number of bacteria present may be below the   limit of detection for the test.  FDA approved assay performed using [employer] real-time PCR.  A negative result does not exclude actual disease due to C. difficile and may   be due to improper collection, handling and storage of the specimen or the   number of organisms in the specimen is below the detection limit of the assay.       C Difficile Toxin B by PCR   Date Value Ref Range Status   06/22/2024 Negative Negative Final     Comment:     A negative result does not exclude actual disease due to C. difficile and may be due to improper collection, handling and storage of the specimen or the number of organisms in the specimen is below the detection limit of the assay.       Virology:    No results found for: \"H6RES\"    EBV DNA Copies/mL   Date Value Ref Range Status   05/01/2021 38,186 (A) EBVNEG^EBV DNA Not Detected [Copies]/mL Final   02/22/2021 75,709 (A) EBVNEG^EBV DNA Not Detected [Copies]/mL Final   01/25/2021 5,619 (A) EBVNEG^EBV DNA Not Detected [Copies]/mL Final   12/09/2020 10,686 (A) EBVNEG^EBV DNA Not Detected [Copies]/mL Final   09/09/2020 2,935 (A) EBVNEG^EBV DNA Not Detected [Copies]/mL Final   03/09/2020 8,918 (A) EBVNEG^EBV DNA Not Detected [Copies]/mL Final "   02/19/2020 38,419 (A) EBVNEG^EBV DNA Not Detected [Copies]/mL Final   12/18/2019 11,933 (A) EBVNEG^EBV DNA Not Detected [Copies]/mL Final   11/11/2019 9,669 (A) EBVNEG^EBV DNA Not Detected [Copies]/mL Final   10/24/2019 13,391 (A) EBVNEG^EBV DNA Not Detected [Copies]/mL Final   08/01/2018 EBV DNA Not Detected EBVNEG^EBV DNA Not Detected [Copies]/mL Final       CMV Antibody IgG   Date Value Ref Range Status   03/01/2018 Canceled, Test credited 0.0 - 0.8 AI Final     Comment:     Test reordered as correct code  SEE CMV QUANTITATIVE PCR     03/01/2018 >8.0 (H) 0.0 - 0.8 AI Final     Comment:     Positive  Antibody index (AI) values reflect qualitative changes in antibody   concentration that cannot be directly associated with clinical condition or   disease state.     02/19/2018 >8.0 (H) 0.0 - 0.8 AI Final     Comment:     Positive  Antibody index (AI) values reflect qualitative changes in antibody   concentration that cannot be directly associated with clinical condition or   disease state.         Toxoplasma Antibody IgG   Date Value Ref Range Status   02/19/2018 <3.0 0.0 - 7.1 IU/mL Final     Comment:     Negative- Absence of detectable Toxoplasma gondii IgG antibodies. A negative   result does not rule out acute infection.  The magnitude of the measured result is not indicative of the amount of   antibody present. The concentrations of anti-Toxoplasma gondii IgG in a given   specimen determined with assays from different manufacturers can vary due to   differences in assay methods and reagent specificity.         Lissett Lewis MD  [facility]  Contact information available via [facility] Paging/Directory     07/05/2024

## 2024-07-05 NOTE — PLAN OF CARE
ICU End of Shift Summary. See flowsheets for vital signs and detailed assessment.    Changes this shift: Neuro status unchanged, alert/following commands. Pain from PEG J placement today in IR, PRN fent given x1. Sinus rhythm 70s-90s. MAP >65. FiO2 weaned down to 40% otherwise vent settings unchanged. No PS today. TF restarted at 30ml. BM x2. Pt and  updated at bedside.     Plan: PS as able to. Work with PT/OT. Continue with POC.       Goal Outcome Evaluation:      Plan of Care Reviewed With: patient, spouse    Overall Patient Progress: improvingOverall Patient Progress: improving    Outcome Evaluation: See note

## 2024-07-05 NOTE — PROCEDURES
[facility]    Procedure: GJ  tube placement    Date/Time: 7/5/2024 9:49 AM    Performed by: Chrissy Lobo MD  Authorized by: Edin Rodriguez MD      UNIVERSAL PROTOCOL   Site Marked: NA  Prior Images Obtained and Reviewed:  Yes  Required items: Required blood products, implants, devices and special equipment available    Patient identity confirmed:  Verbally with patient, arm band, provided demographic data and hospital-assigned identification number  Patient was reevaluated immediately before administering moderate or deep sedation or anesthesia  Confirmation Checklist:  Patient's identity using two indicators, relevant allergies, procedure was appropriate and matched the consent or emergent situation and correct equipment/implants were available  Time out: Immediately prior to the procedure a time out was called    Universal Protocol: the Joint Commission Universal Protocol was followed    Preparation: Patient was prepped and draped in usual sterile fashion       ANESTHESIA    Anesthesia:  Local infiltration  Local Anesthetic:  Lidocaine 1% without epinephrine      SEDATION  Patient Sedated: Yes    Vital signs: Vital signs monitored during sedation    See dictated procedure note for full details.  Findings: 18Fr GJ tube placement.     Specimens: none    Complications: None    Condition: Stable    Plan: 4 hour NPO. Tube to gravity.      PROCEDURE    Patient Tolerance:  Patient tolerated the procedure well with no immediate complications  Length of time physician/provider present for 1:1 monitoring during sedation: 45

## 2024-07-05 NOTE — PROGRESS NOTES
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  Status post airway stent placement by IP on 6/20. Hospital course complicated by hypotension, delirium, and prolonged respiratory failure.    Changes today, 7/5:  - S/p PEG/J placement this morning, feedings and meds through tube at 1400  - Off pressors  - Continue BID SBT trials on pressure support 10/5 as tolerated  - Stress dose steroids: hydrocortisone 25 mg today and switch to PTA prednisone 5 mg daily starting 7/6    PLAN:    Neuro:  # Pain   - Acetaminophen 325mg q4H  - IV fentanyl prn     # Sedation  - Not currently sedated    # Toxic metabolic encephalopathy, improving  Ongoing lethargy in setting of sepsis and delirium in the setting of high-dose steroid therapy. Patient reports improved sleep on current medications. Following commands this morning.   - Quetiapine 50 mg at bedtime   - Continue hydroxyzine prn for anxiety   - Ramalteon and trazodone 25mg for sleep   - Weaning stress dose steroids as tolerated  - NPI q4h for 3-5 days to determine if periods of decreased arousal related to sedation medications, normal  - Continue to attempt to keep patient awake during day to normalize sleep cycle    Pulmonary:  # Acute on chronic hypoxic hypercapnic respiratory failure  # HAP, Stenotrophomonas maltophilia, Enterococcus faecalis, and Candida guilliermondi complex  # Severe pulmonary edema  # Acute respiratory distress syndrome, resolving  Presented to the ED on 6/18/2024 with acute  on chronic hypoxic hypercapnic respiratory failure. Transferred to MICU and intubated on overnight on 6/18. Workup significant for Stenotrophomonas, Enterococcus, and Candida pneumonia. Course was c/b progression to ARDS (6/21-22) with elevated plateau pressures. Pt remain intubated d/t ongoing pulmonary edema iso ESRD on iHD. CXR (6/26) with similar pulmonary opacities compared to 6/24, left > right.  The etiology of this groundglass opacity was unclear but ddx includes rejection vs. infection vs. volume overload.   - Antibiotics, as below  - Volume management with iHD  - Mechanical ventilation, wean as able  - Daily SBT (AM and PM) on pressure support 10/5  - s/p tracheostomy 7/4  - Pulmonary toilet  - Mucomyst BID  - Hypertonic saline BID, alternate with mucomyst  - Albuterol neb QID  - Did not tolerate volera  - Repeat bronch 7/4 for BAL washings with cultures pending.     Vent Mode: CMV/AC  (Continuous Mandatory Ventilation/ Assist Control)  FiO2 (%): 50 %  Resp Rate (Set): 18 breaths/min  Tidal Volume (Set, mL): 320 mL  PEEP (cm H2O): 5 cmH2O  Pressure Support (cm H2O): 12 cmH2O  Resp: 20      # Recurrent right middle bronchus stenosis s/p dilation and stent (6/20/2024)  Has history (bronchoscopy 2/15/24) demonstrating narrowing of right mainstem transplant anastomosis and bronchus intermedius. CT chest (6/18/2024) and bronchoscopy (6/19/2024) demonstrated occlusion of right middle bronchus. With concern for post-obstructive pneumonia, interventional pulmonology was consulted, and completed bronchoscopy (6/20/2024) with tissue debulking, right middle bronchus balloon dilation to 11 mm, stent placement in right main bronchus, and bifurcating cast placement in right upper bronchus. Plan for saline nebs BID and outpatient bronchoscopy in 6 weeks.   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  - Transplant pulm consulted and following  - Tacrolimus level (6/27) was 4.5 (low)  - PTA nebs  - Fluticasone-viilanterol (breo ellipta) every day - HOLD with respiratory infection  - Montelukast every day   - Immunosuppressants per Tx Pulm:   - PTA Tacrolimus (dosing per tx pulm)  - PTA Prednisone 5 mg daily - HOLD with stress dose steroids  - PTA azathioprine - HOLD per Transplant pulmonology with repeat infections      Cardiovascular:  # Septic shock, persistent   On 6/19/2024, developed sepsis (hypotension 80s/50s, tachypnea 24) in the setting of stenotrophomonas pneumonia/enterococcus faecium VRE UTI. Etiology of shock likely distributive iso sepsis; less likely hypovolemic (not pulling volumes with CRRT), medication-associated (weaned off of propofol gtt) or obstructive (low suspicion for PE, echo 6/19 without evidence of cardiac dysfunction).   Pressor requirements slowly improving but are sluggish, driven by low diastolic pressure. Patient on and off small amount of phenylephrine over past several days.  - Has been off phenylephrine since yesterday  - MAP goal >65  - Stress dose steroids   Current: hydrocortisone 25 mg today and then switch to PTA prednisone 5 mg daily starting 7/6   Past course:  - Hydrocortisone 50mg q6H (6/21-6/26); 50 mg BID (6/26-6/28); 25 mg BID (6/28-7/3)  - Fludrocortisone 0.1 mg qday (6/21-6/28); 0.05 mg qday (6/28-30)  - If hemodynamically stable, consider transitioning to PTA prednisone at increased 40 mg daily (then titrate to 5 mg)  - Continue midodrine to 20 mg TID to support pressor weaning.     # Demand Ischemia, resolved    Presented with elevated troponin (peak 499). Not associated with chest pain or hypoxia. EKG without ST elevations/depressions or T wave inversions. Suspect demand ischemia in the setting of  sepsis. Will continue to monitor symptoms and continuous telemetry.   -EKG 7/2 sinus rhythm with PACs     # Paroxysmal A-Fib, improved  # Pulmonary Hypertension   History of pulmonary hypertension (45 mmHg, echo 10/2023) in the setting of ILD and paroxysmal afib on PTA metoprolol tartrate BID. Not on anticoagulation for afib.   - PTA metoprolol tartrate 25mg BID     GI/Nutrition:  # Nutrition  PEGJ placed today by IR, unable to use for 4 hours post procedure, restart tube feedings and medications through PEG/J at 1400  - Nutrition consult  - Tube feeds 30ml/hr; at goal    #Nausea  Reported nausea yesterday  -Zofran 4 mg po q4 hours prn    # Diarrhea,   On 6/21, had 8 bowel movements. Occurred in the setting of scheduled bowel regimen and starting new antibiotics (meropenem, levofloxacin). C diff negative.   Ongoing loose stools overnight and today  - Bowel regimen  - Miralax PRN   - Senna prn  - Trial of Nutrisource Fiber and monitor-3 packets daily per nutrition recs      # Cholelithiasis with gallbladder wall thickening, resolved  During admission patient found to have up trending LFTs and fever in setting of GB wall thickening on CT abdomen/pelvis, now resolved  - Consult GI, appreciate recs  - MRCP completed showing borderline dilated CBD up to 1.1 cm, no significant intrahepatic biliary dilation, no evidence of choledocholithiasis, no evidence of acute cholecystitis.      Renal/Fluids/Electrolytes:  # ESRD on iHD (M,W,F)  History of ESRD 2/2 Tacrolimus toxicity on HD (MWF). With soft blood pressures, placed RIJ (6/20/2024) and started CRRT (6/20/2024).  - Nephrology consulted and following for ESRD   - Currently off CRRT   - iHD yesterday, concern for post HD fistula bleeding- US Left arterial fistula to evaluate for patency  - Plan for iHD today tomorrow  - Renally-dosed medications  -BMP daily      Endocrine:  # Risk for hyperglycemia with stress-dose steroids  Stress dose steroids end today, taper to PTA  prednisone dose tomorrow  - NPH 8U BID  -CTM     ID:  # HAP, Stenotrophomonas maltophilia, Enterococcus faecalis, Aspergillus, and Candida guilliermondi complex  Presented to the ED on 6/18/2024 with SOB and hypercapnic respiratory failure. Found on bronchoscopy (6/19) to have RML obstruction by narrowing bronchus intermedius as well as copious mucopurulent secretions/mucus plugging. Previously treated for Stenotrophonomas/aspergillus pneumonia in 11/2023 with subsequent sputum culture (2/2024) negative for both Stenotrophonomas/Aspergillus. Etiology likely repeat Stenotrophomonas infection vs opportunistic infection from colonized microbe.   - Workup  - 6/18 sputum cx: Stenotrophomonas maltophilia (ceftazidime and levofloxacin R)  - 6/19 BAL cx: Stenotrophomonas maltophilia, Enterococcus faecalis (gentamicin R), Aspergillus (speciation in process)  - 6/21 BAL cx: Stenotrophomonas maltophilia and Enterococcus faecalis VRE  - 6/24 sputum cx: Stenotrophomonas maltophilia, Enterococcus faecalis VRE, and Candida guilliermondi complex  - Transplant pulmonology consult, appreciate recs  - Transplant ID consult, appreciate recs  - Present antibiotics  - IV minocycline 100mg BD x 14 days total (through 7/5) (6/20-x)- for stenotrophamonas  - PO linezolid 600 mg BID x 10 days (through 7/5) (6/25-x)- for VRE in urine and BAL cultures  - IV micafungin 150 mg daily and posaconozale 300 mg daily (through 7/5) (6/25-x)  for candida guillermondii and asergillus ochraceus on BAL  - Past antibiotics  - S/p ceftazidime (6/25-6/28)    - S/p vancomycin (6/18-6/20)   - S/p zosyn (6/18-21)  - S/p Daptomycin (6/21-6/23)  - S/p Meropenem (6/21-6/24)  - S/p Levofloxacin (6/21-6/23)    - BAL fluid (7/3) pink, hazy, cell counts normal range, fluid sent for viral, bacterial and fungal cultures- pending      # Pyuria, Enterococcus faecium VRE and  ESBL E. Coli   Asymptomatic. With progressive IV pressor needs, obtained broad infectious workup  which demonstrated UCx (6/19/2024) with Enterococcus faecium VR and ESBL E. Coli. S/p Daptomycin (6/21-6/23) and Meropenem (6/21-6/24).    # Hx Aspergillus PNA (11/2023)  # Hx PJP PNA (1/2021)  # Hx CMV viremia, recurrent  # Hx EBV viremia  - Transplant ID consult, appreciate recs  PTA posaconazole (Treatment for hx of aspergillus PNA)  PTA azithromycin 250mg qd (CLAD ppx)  PTA bactrim (PJP ppx)     Hematology:    # Acute on Chronic Normocytic Anemia, stable  # Thrombocytopenia, chronic  History of chronic anemia in the setting of kidney disease (baseline Hgb 10-11). Upon admission, Hgb 9. May be bone marrow suppression in the setting of chronic illness; potential contribution by blood loss with CRRT filter changes (~150-200c). Peripheral smear (6/18/2024) without evidence of schistocytes.  - Hb stable  - Continue to monitor CBC    Musculoskeletal:  - PT / OT consult     Skin:  - Sacral Blanching- off load with frequent turns in bed    General Cares/Prophylaxis:    DVT Prophylaxis: subcutaneous heparin  GI Prophylaxis: PPI  Restraints: None  Family Communication:  at bedside  Code Status: Full Code     Lines/tubes/drains:  - PIV x2, midline  - RIJ  - A line  - Tracheostomy  - PEG/J    Disposition:  - Medical ICU   - Care coordinator consulted regarding potential LTAC placement likely for next week.    Patient seen and findings/plan discussed with medical ICU staff, Dr. James.    Edin Davis MD  Internal Medicine Resident, PGY-1      Clinically Significant Risk Factors        # Hypokalemia: Lowest K = 3.1 mmol/L in last 2 days, will replace as needed       # Hypoalbuminemia: Lowest albumin = 2.2 g/dL at 6/21/2024  4:26 PM, will monitor as appropriate   # Thrombocytopenia: Lowest platelets = 70 in last 2 days, will monitor for bleeding                 #Precipitous drop in Hgb/Hct: Lowest Hgb this hospitalization: 6.9 g/dL. Will continue to monitor and treat/transfuse as appropriate.      # Moderate  Malnutrition: based on nutrition assessment    # Financial/Environmental Concerns: none                  ====================================  INTERVAL HISTORY:   No acute events overnight, had HD yesterday. Remained off pressors overnight. Patient reports she slept better than previous days. She denied pain at trach site.  updated at bedside.      OBJECTIVE:   1. VITAL SIGNS:   Temp:  [97.5  F (36.4  C)-98.5  F (36.9  C)] 97.6  F (36.4  C)  Pulse:  [] 76  Resp:  [20-24] 20  BP: (117)/(45) 117/45  MAP:  [57 mmHg-82 mmHg] 79 mmHg  Arterial Line BP: (101-131)/(36-62) 128/52  FiO2 (%):  [45 %-50 %] 50 %  SpO2:  [90 %-100 %] 90 %  Vent Mode: CMV/AC  (Continuous Mandatory Ventilation/ Assist Control)  FiO2 (%): 50 %  Resp Rate (Set): 18 breaths/min  Tidal Volume (Set, mL): 320 mL  PEEP (cm H2O): 5 cmH2O  Pressure Support (cm H2O): 12 cmH2O  Resp: 20    2. INTAKE/ OUTPUT:   I/O last 3 completed shifts:  In: 2261 [I.V.:546; NG/GT:1265]  Out: 1400 [Other:1400]    3. PHYSICAL EXAMINATION:  General: Sitting up in bed, NAD   HEENT: Atraumatic, moist mucous membranes   Pulm/Resp: Ongoing coarse breath sounds, unchanged from prior exam. Mild expiratory wheezing, stable. Good air movement throughout.   CV: RRR, no m/r/g   Abdomen: Soft, non-distended, non-tender, bowel sounds present.  Ext: Trace bilateral LE edema, pulses 2+ radial, pedal  Incisions/Skin: No rashes or lesions, trach in place with oozing on gauze, no signs of bleeding  Neuro: Awake and alert, follows 1-step and 2-step commands and nods and shakes head to questions, moving all extremities equally    4. LABS:   Arterial Blood Gases   Recent Labs   Lab 06/30/24  1537   PH 7.38   PCO2 41   PO2 154*   HCO3 24     Complete Blood Count   Recent Labs   Lab 07/05/24  0339 07/04/24  0335 07/03/24  0422 07/02/24  0359   WBC 4.7 6.8 6.5 6.8   HGB 7.4* 8.4* 8.7* 9.0*   PLT 70* 94* 94* 100*     Basic Metabolic Panel  Recent Labs   Lab 07/05/24  0342 07/05/24  0339  07/04/24  2321 07/04/24 2023 07/04/24  1631 07/04/24  0340 07/04/24  0335 07/03/24  1614 07/03/24  1117   NA  --  131*  --   --  135  --  132*  --  134*   POTASSIUM  --  3.5  --   --  3.1*  --  3.6  --  3.4   CHLORIDE  --  100  --   --  101  --  103  --  103   CO2  --  23  --   --  23  --  18*  --  20*   BUN  --  34.9*  --   --  26.4*  --  67.4*  --  54.2*   CR  --  1.57*  --   --  1.18*  --  2.30*  --  1.84*   GLC 82 88 107* 146* 164*   < > 143*   < > 128*    < > = values in this interval not displayed.     Liver Function Tests  Recent Labs   Lab 07/02/24  0359 07/01/24  1637 07/01/24  0346 06/30/24  1527 06/30/24  0332 06/29/24  1604 06/29/24  0359   AST 26  --  25  --  26  --  22   ALT 37  --  36  --  38  --  36   ALKPHOS 176*  --  173*  --  178*  --  146   BILITOTAL 0.6  --  0.6  --  0.8  --  0.8   ALBUMIN 2.8* 2.7* 2.6* 2.7* 2.6*   < > 2.5*    < > = values in this interval not displayed.     Coagulation Profile  No lab results found in last 7 days.      5. RADIOLOGY:   Recent Results (from the past 24 hour(s))   XR Chest Port 1 View    Impression    RESIDENT PRELIMINARY INTERPRETATION  IMPRESSION: Stable postoperative chest status post placement of a  tracheostomy tube.

## 2024-07-05 NOTE — IR NOTE
Patient Name: Kecia Blue  Medical Record Number: [id number]  Today's Date: 7/5/2024    Procedure: Gastrojejunostomy tube placement  Proceduralist: Dr Yamil Bishop      Procedure Start: 0907  Procedure end: 0946  Sedation medications administered: 1 mg Midazolam, 50 mcg Fentanyl     Report given to: ANAI Ramirez, RN      Other Notes: Pt arrived to IR room 4 from . Consent reviewed. Pt denies any questions or concerns regarding procedure. Pt positioned supine and monitored per protocol. Pt tolerated procedure without any noted complications. Pt transferred back to .

## 2024-07-06 NOTE — PROGRESS NOTES
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 Nephrology consulted for management of HD while admitted.      Interval History :   - Pt seen during dialysis this morning. Running fine, BP tolerating 2L UF goal fine.   - BP overnight ranging 98/40 - 148/57, mostly close to 120s/50s  - On vent, 40% FiO2, 5 PEEP. Doing PST twice daily.   - Had PEG placed yesterday. Primary team discussing LTACH placement.  - Weight today 52.6 kg (new EDW is 49-50 kg)  - Yesterday IR consulted for prolonged bleeding after iHD on 7/4. Had prior intervention in early march (3/5/24) for similar issue, found to have venous stenosis and had angioplasty.   - US of access last night suggestive of recurrent stenosis in venous outflow (area of prior angioplasty in March)    Assessment & Recommendations:   ESRD-ESKD: pt dialyzes MWF at [facility] (ph [phone], f [phone]) with Dr. Glasgow. Run time: 3.5 hrs. EDW 52.5 kg. Access: L AVF. +heparin 1,500u bolus.                  -Appreciate team placing tri-alysis line     - HD today: 3h, goal UF 1-2L                -No need for new dialysis consent, continuing long term HD for ESRD.                -L AVF: had angioplasty for venous outflow stenosis in March. Noted prolonged bleeding after HD on 7/4, access US yesterday suggests recurrent stenosis. Will discuss with IR regarding fistulogram to address stenosis (would like to do this before she is transferred to LTACH).         Outpt Rx:       Volume- Below outpt EDW now at ~49kg, had to back off on UF on 7/4. New EDW appears 49-50 kg.       Pulm-Admitted with hypercapnic  "resp failure, suspected PNA. ID involved, getting bronch.  Currently on bactrim, micafungin, Posaconazole, Azithromycin and Vanco x1 dose so far.       Electrolytes- Na 127 (due to free water intake), K 3.7.     BMD-Ca 8.0, phos 5.1. On cholecalciferol 50 mcg daily.       Anemia-Hgb 8.1, last PRBC's 6/26 on venofer 50mg weekly but will hold while treating for infection. On Mircera 60mcg [id number], will cover with short term Epo (4000 units) while admitted.       Nutrition-[facility] renal      Time spent: 40 minutes on this date of encounter for chart review, physical exam, medical decision making and co-ordination of care.      Recommendations were communicated to primary team via this note.     Lesly Goodman DO, MSCI   of Medicine, Division of Nephrology and Hypertension  [facility]  Pager [phone]      Review of Systems:   I reviewed the following systems:  ROS not done due to vent/sedation    Physical Exam:   I/O last 3 completed shifts:  In: 1527 [I.V.:677; NG/GT:340]  Out: -    BP (!) 86/44   Pulse 90   Temp 98  F (36.7  C) (Axillary)   Resp 21   Ht 1.6 m (5' 3\")   Wt 52.6 kg (115 lb 15.4 oz)   LMP 06/07/2014 (Exact Date)   SpO2 96%   BMI 20.54 kg/m       GENERAL APPEARANCE: Chronically ill-appearing, vent via trach   EYES: No scleral icterus  Pulmonary: mechanical BS, tracheostomy  CV: Regular rhythm, normal rate   - trace edema  GI: soft, nontender, normal bowel sounds  MS: no evidence of inflammation in joints, no muscle tenderness  : No Basilio  SKIN: no rash, warm, dry  NEURO: No focal deficits  ACCESS: LUE AVF, cannulated    Labs:   All labs reviewed by me  Electrolytes/Renal -   Recent Labs   Lab Test 07/06/24  0740 07/06/24  0551 07/06/24  0550 07/05/24  0342 07/05/24  0339 07/04/24  2023 07/04/24  1631 07/04/24  0340 07/04/24  0335   NA  --  127*  --   --  131*  --  135  --  132*   POTASSIUM  --  3.7  --   --  3.5  --  3.1*  --  3.6   CHLORIDE  --  95*  --   --  100  --  101  --  " 103   CO2  --  21*  --   --  23  --  23  --  18*   BUN  --  55.5*  --   --  34.9*  --  26.4*  --  67.4*   CR  --  2.34*  --   --  1.57*  --  1.18*  --  2.30*   * 131* 126*   < > 88   < > 164*   < > 143*   CHELSEY  --  8.0*  --   --  7.8*  --  8.1*  --  7.7*   MAG  --  2.1  --   --  2.0  --  1.9  --  2.6*   PHOS  --  5.1*  --   --  3.4  --   --   --  7.0*    < > = values in this interval not displayed.       CBC -   Recent Labs   Lab Test 07/06/24  0551 07/05/24  0339 07/04/24  0335   WBC 5.2 4.7 6.8   HGB 8.1* 7.4* 8.4*   PLT 75* 70* 94*       LFTs -   Recent Labs   Lab Test 07/02/24  0359 07/01/24  1637 07/01/24  0346 06/30/24  1527 06/30/24  0332   ALKPHOS 176*  --  173*  --  178*   BILITOTAL 0.6  --  0.6  --  0.8   ALT 37  --  36  --  38   AST 26  --  25  --  26   PROTTOTAL 5.4*  --  5.0*  --  4.9*   ALBUMIN 2.8* 2.7* 2.6*   < > 2.6*    < > = values in this interval not displayed.       Iron Panel -   Recent Labs   Lab Test 02/03/21  0415 12/13/18  1033 08/01/18  0921   IRON 51 16* 93   IRONSAT 36 7* 37   YOLA  --  302* 571*           Current Medications:  Current Facility-Administered Medications   Medication Dose Route Frequency Provider Last Rate Last Admin    acetaminophen (TYLENOL) tablet 325 mg  325 mg Oral or Feeding Tube Q4H Jailyn Lou MD   325 mg at 07/06/24 0553    acetylcysteine (MUCOMYST) 10 % nebulizer solution 4 mL  4 mL Inhalation BID Velez Reyes, German, MD   4 mL at 07/06/24 0748    azithromycin (ZITHROMAX) tablet 250 mg  250 mg Oral or Feeding Tube Daily Velez Reyes, German, MD   250 mg at 07/06/24 0728    calcium carbonate (TUMS) chewable tablet 500 mg  500 mg Oral Once per day on Sunday Tuesday Thursday Saturday Josesito Pratt MD   500 mg at 07/02/24 0812    chlorhexidine (PERIDEX) 0.12 % solution 15 mL  15 mL Mouth/Throat Q12H Chio Cortez MD   15 mL at 07/06/24 0728    epoetin alisha-epbx (RETACRIT) injection 4,000 Units  4,000 Units Intravenous Once in  dialysis/CRRT Hardy Tran APRN CNS        epoetin alisha-epbx (RETACRIT) injection 4,000 Units  4,000 Units Intravenous Once per day on Monday Wednesday Friday Velez Reyes, German, MD   4,000 Units at 07/05/24 1150    fiber modular (NUTRISOURCE FIBER) packet 1 packet  1 packet Per Feeding Tube Q8H Awa Watt MD   1 packet at 07/06/24 0729    heparin ANTICOAGULANT injection 5,000 Units  5,000 Units Subcutaneous Q8H Rossana Sarabia APRN CNP   5,000 Units at 07/06/24 0553    insulin aspart (NovoLOG) injection (RAPID ACTING)  1-12 Units Subcutaneous Q4H Edin Davis MD   1 Units at 07/05/24 2111    insulin NPH injection 8 Units  8 Units Subcutaneous BID Velez Reyes, German, MD   8 Units at 07/06/24 0740    levalbuterol (XOPENEX) neb solution 0.63 mg  0.63 mg Nebulization 4x Daily Gareth Mosquera MD   0.63 mg at 07/06/24 0747    [Held by provider] magnesium chloride CR tablet 1,070 mg  1,070 mg Oral Once per day on Sunday Tuesday Thursday Saturday Josesito Pratt MD        metoprolol tartrate (LOPRESSOR) tablet 25 mg  25 mg Oral BID Velez Reyes, German, MD   25 mg at 07/06/24 0728    micafungin (MYCAMINE) 150 mg in sodium chloride 0.9 % 100 mL intermittent infusion  150 mg Intravenous Q24H Chio Cortez  mL/hr at 07/06/24 0103 150 mg at 07/06/24 0103    midodrine (PROAMATINE) tablet 20 mg  20 mg Oral or Feeding Tube Q8H BMT Rossana Sarabia APRN CNP   20 mg at 07/06/24 0552    montelukast (SINGULAIR) tablet 10 mg  10 mg Oral At Bedtime Velez Reyes, German, MD   10 mg at 07/05/24 2100    multivitamin RENAL (RENAVITE RX/NEPHROVITE) tablet 1 tablet  1 tablet Oral or Feeding Tube Daily Awa Watt MD   1 tablet at 07/06/24 0728    No heparin via hemodialysis machine   Does not apply Once Hardy Tran APRN CNS        pantoprazole (PROTONIX) 2 mg/mL suspension 40 mg  40 mg Per Feeding Tube Daily Randi James MD   40 mg at 07/05/24 2101    posaconazole (NOXAFIL) DR tablet  TBEC 300 mg  300 mg Per Feeding Tube Daily Josesito Pratt MD   300 mg at 07/05/24 1404    predniSONE (DELTASONE) tablet 5 mg  5 mg Oral Daily Rossana Sarabia APRN CNP   5 mg at 07/06/24 0728    protein modular (PROSOURCE TF20) packet 1 packet  1 packet Per Feeding Tube Daily Edin Davis MD   1 packet at 07/06/24 0729    QUEtiapine (SEROquel) tablet 50 mg  50 mg Oral or Feeding Tube At Bedtime Kajal Chong APRN CNP   50 mg at 07/05/24 2100    ramelteon (ROZEREM) tablet 8 mg  8 mg Oral At Bedtime Velez Reyes, German, MD   8 mg at 07/05/24 2101    sodium chloride (NEBUSAL) 3 % neb solution 3 mL  3 mL Nebulization BID Velez Reyes, German, MD   3 mL at 07/05/24 2038    sodium chloride (PF) 0.9% PF flush 10 mL  10 mL Intracatheter Q8H Kajal Chong APRN CNP   10 mL at 07/06/24 0729    sodium chloride (PF) 0.9% PF flush 10 mL  10 mL Intracatheter Q8H Kajal Chong APRN CNP   10 mL at 07/06/24 0729    sulfamethoxazole-trimethoprim (BACTRIM) 400-80 MG per tablet 1 tablet  1 tablet Oral or Feeding Tube Once per day on Tuesday Thursday Saturday Randi James MD        tacrolimus (GENERIC) suspension 0.8 mg  0.8 mg Oral or Feeding Tube BID IS Yonny Orona MD   0.8 mg at 07/06/24 0728    traZODone (DESYREL) half-tab 25 mg  25 mg Per Feeding Tube At Bedtime Jailyn Lou MD   25 mg at 07/05/24 2101    valGANciclovir (VALCYTE) solution 450 mg  450 mg Per Feeding Tube Once per day on Tuesday Saturday Randi James MD        Vitamin D3 (CHOLECALCIFEROL) tablet 50 mcg  50 mcg Oral Once per day on Monday Wednesday Friday Velez Reyes, German, MD   50 mcg at 07/05/24 1614     Current Facility-Administered Medications   Medication Dose Route Frequency Provider Last Rate Last Admin    dextrose 10% infusion   Intravenous Continuous PRN Velez Reyes, German, MD   Stopped at 07/03/24 1600    dextrose 10% infusion   Intravenous Continuous PRN Awa Watt MD   Stopped at 07/05/24 1400    Stop  Heparin 60 minutes before end of treatment   Does not apply Continuous PRN Hardy rTan, APRN CNS

## 2024-07-06 NOTE — PROGRESS NOTES
"Care Management Follow Up    Length of Stay (days): 18    Expected Discharge Date: TBD      Concerns to be Addressed: discharge planning  Likely discharge to LTACH, awaiting family decision on facility  Patient plan of care discussed at interdisciplinary rounds: No    Anticipated Discharge Disposition: LTACH     Anticipated Discharge Services: None  Anticipated Discharge DME:      Patient/family educated on Medicare website which has current facility and service quality ratings: yes  Education Provided on the Discharge Plan: Yes  Patient/Family in Agreement with the Plan: yes    Referrals Placed by CM/SW:    Private pay costs discussed: transportation costs    Additional Information:  Met with patient's spouse, Ranjan, to discuss LTACH placement options. Provided printout from Medicare website comparing options including [facility], [facility], and [facility] in [place].     Explained to Ranjan that Medicare will not cover transportation to [facility] in [place], provided transport cost estimate. He verbalized understanding and stated that they will likely remain local so that the patient will be close to this hospital if the need to return arises. Ranjan plans to do some research and will make a decision within the next 48 hours.    Spoke with Ranjan about his current living situation. He is staying at an extended-stay hotel which he describes as expensive, somewhat unclean, and \"not ideal.\" He has been in contact with [facility], but they do not have availability at this time. He reports that he has some other calls out and expects to hear more after the weekend. 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     Ranjan noted no additional questions at this time, and agreed to reach out for any needs or concerns that may arise. Care coordination team will continue to follow for collaboration in the development of a safe discharge plan.      Kel Haywood RN " Care Coordinator  7/6/2024    Social Work and Care Management Department       SEARCHABLE in [facility] - search CARE COORDINATOR       [facility]    Units: 5A Onc 5201 - 5219,  5A Onc 5220 - 5240, 5C 1687 - 0091 & 5C 9515 - 4324       Units: 6A Neuro, 6B IMC, 6C Card 2082 - 1417, 6C Card 9759 - 9322 & 6C Card 5820 - 1570        Units: 7A SOT, 7B Med Surg, 7C Med Surg 7401 - 7318 & 7C Med Surg 9897 - 8095       Units: 4A CVICU, 4C MICU, & 4E SICU         [facility] [place]: 5 [place]o & 5 Med Surg        Units: 6 Med Surg & 8 Med Surg

## 2024-07-06 NOTE — PROGRESS NOTES
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  Status post airway stent placement by IP on 6/20. Hospital course complicated by hypotension, delirium, and prolonged respiratory failure.    Changes today, 7/5:  - HD today with 1-2L UF as tolerated per nephrology  - Remains off pressors, continue midodrine 20mg TID  - Continue BID SBT trials on pressure support 10/5 as tolerated  - Discontinue stress dose steroids, restart PTA prednisone 5mg daily   - US let AVF with venous stenosis near subclavian, IR consulted  - Patient increased ectopy and in and out of afib during HD without change in MAPs, resolved once HD ended, stat EKG ordered, stat BMP and Mg after HD completed, patient currently in NSR rate in 80s  - Nephrology started epoetin 4,000 units daily for acute on chronic anemia  - Remove right radial arterial line    PLAN:    Neuro:  # Pain   - Acetaminophen 325mg q4H  - IV fentanyl prn     # Sedation  - Not currently sedated    # Toxic metabolic encephalopathy, improving  Ongoing lethargy in setting of sepsis and delirium in the setting of high-dose steroid therapy. Patient reports improved sleep on current medications. Following commands this morning.   -Reports slept better last night  - Quetiapine 50 mg at bedtime   - Discontinue hydroxyzine prn  - Ramalteon and trazodone 25mg for sleep   - Weaning stress dose steroids as tolerated  - NPI q4h for 3-5 days to determine if periods of decreased arousal related to sedation  medications, normal  - Continue to attempt to keep patient awake during day to normalize sleep cycle    Pulmonary:  # Acute on chronic hypoxic hypercapnic respiratory failure  # HAP, Stenotrophomonas maltophilia, Enterococcus faecalis, and Candida guilliermondi complex  # Severe pulmonary edema  # Acute respiratory distress syndrome, resolving  Presented to the ED on 6/18/2024 with acute on chronic hypoxic hypercapnic respiratory failure. Transferred to MICU and intubated on overnight on 6/18. Workup significant for Stenotrophomonas, Enterococcus, and Candida pneumonia. Course was c/b progression to ARDS (6/21-22) with elevated plateau pressures. Pt remain intubated d/t ongoing pulmonary edema iso ESRD on iHD. CXR (6/26) with similar pulmonary opacities compared to 6/24, left > right.  The etiology of this groundglass opacity was unclear but ddx includes rejection vs. infection vs. volume overload.   - Ongoing course breath sounds with some expiratory wheezes on exam  - Antibiotics, as below  - Volume management with iHD  - Mechanical ventilation, wean as able  - Daily SBT (AM and PM) on pressure support 10/5  - s/p tracheostomy 7/4  - Pulmonary toilet  - Mucomyst BID  - Hypertonic saline BID, alternate with mucomyst  - Albuterol neb QID  - Did not tolerate volera  - Repeat bronch 7/4 for BAL washings with cultures pending.   - Repeat CXR on Monday, 7/8 per pulm transplant    Vent Mode: CMV/AC  (Continuous Mandatory Ventilation/ Assist Control)  FiO2 (%): 40 %  Resp Rate (Set): 18 breaths/min  Tidal Volume (Set, mL): 350 mL  PEEP (cm H2O): 5 cmH2O  Resp: 21    # Recurrent right middle bronchus stenosis s/p dilation and stent (6/20/2024)  Has history (bronchoscopy 2/15/24) demonstrating narrowing of right mainstem transplant anastomosis and bronchus intermedius. CT chest (6/18/2024) and bronchoscopy (6/19/2024) demonstrated occlusion of right middle bronchus. With concern for post-obstructive pneumonia,  interventional pulmonology was consulted, and completed bronchoscopy (6/20/2024) with tissue debulking, right middle bronchus balloon dilation to 11 mm, stent placement in right main bronchus, and bifurcating cast placement in right upper bronchus. Plan for saline nebs BID and outpatient bronchoscopy in 6 weeks.   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  - Transplant pulm consulted and following  - Prospera (7/3) pending  - DSA (7/3) pending  - Tacrolimus level to be ordered 7/8  - PTA nebs  - Fluticasone-viilanterol (breo ellipta) every day - HOLD with respiratory infection  - Montelukast every day   - Immunosuppressants per Tx Pulm:   - PTA Tacrolimus (dosing per tx pulm)  - PTA Prednisone 5 mg daily   - PTA azathioprine - HOLD per Transplant pulmonology with repeat infections  - Antibiotic prophylaxis   - Bactrim MWF for PJP ppx (monitor need to adjust pending HD plan)  - CMV weekly monitoring (qTuesday)  - Azithromycin per pulmonary   - Continue VGCV ppx (renally dosed, monitor need to adjust pending HD plan) with tentative plan for 3 weeks beyond completion of stress dose steroids       Cardiovascular:  # Septic shock, improving  On 6/19/2024, developed sepsis (hypotension 80s/50s, tachypnea 24) in the setting of stenotrophomonas pneumonia/enterococcus faecium VRE UTI. Etiology of shock likely distributive iso sepsis; less likely hypovolemic (not pulling volumes with CRRT), medication-associated (weaned off of propofol gtt) or obstructive (low suspicion for PE, echo 6/19 without evidence of cardiac dysfunction).   Pressor requirements slowly improving but are sluggish, driven by low diastolic pressure. Patient on and off small  amount of phenylephrine over past several days.  - Has been off phenylephrine for 2 days  - Had transient episode of MAPs <65 into 50s with concordance between manual cuff and arterial line, 500ml LR bolus ordered not given because resolved without intervention.  - MAP goal >65  - Discontinue stress dose steroids today. Switch to PTA prednisone 5 mg daily    Past course:  - Hydrocortisone 50mg q6H (6/21-6/26); 50 mg BID (6/26-6/28); 25 mg BID (6/28-7/3), 25 mg daily (7/3-7/5)  - Fludrocortisone 0.1 mg qday (6/21-6/28); 0.05 mg qday (6/28-30)  - Continue midodrine to 20 mg TID to support MAPs           .    # Demand Ischemia, resolved    Presented with elevated troponin (peak 499). Not associated with chest pain or hypoxia. EKG without ST elevations/depressions or T wave inversions. Suspect demand ischemia in the setting of sepsis. Will continue to monitor symptoms and continuous telemetry.   -EKG 7/2 sinus rhythm with PACs     # Paroxysmal A-Fib, improved  # Pulmonary Hypertension   History of pulmonary hypertension (45 mmHg, echo 10/2023) in the setting of ILD and paroxysmal afib on PTA metoprolol tartrate BID. Not on anticoagulation for afib.   - PTA metoprolol tartrate 25mg BID   - Patient increased ectopy and in and out of afib during HD without change in MAPs, resolved once HD ended  - Follow up with stat EKG ordered, stat BMP and Mg after HD completed  - Patient currently in NSR rate in 80s    GI/Nutrition:  # Nutrition  PEGJ placed today by IR, unable to use for 4 hours post procedure, restart tube feedings and medications through PEG/J at 1400  - Nutrition consult  - Tube feeds 30ml/hr; at goal    #Nausea, resolved  Reported nausea yesterday  -Discontinue Zofran     # Diarrhea,   On 6/21, had 8 bowel movements. Occurred in the setting of scheduled bowel regimen and starting new antibiotics (meropenem, levofloxacin). C diff negative.   Ongoing loose stools overnight and today  - Bowel regimen  - Miralax PRN    - Senna prn  - Trial of Nutrisource Fiber and monitor-3 packets daily per nutrition recs      # Cholelithiasis with gallbladder wall thickening, resolved  During admission patient found to have up trending LFTs and fever in setting of GB wall thickening on CT abdomen/pelvis, now resolved  - Consult GI, appreciate recs  - MRCP completed showing borderline dilated CBD up to 1.1 cm, no significant intrahepatic biliary dilation, no evidence of choledocholithiasis, no evidence of acute cholecystitis.      Renal/Fluids/Electrolytes:  # ESRD on iHD (M,W,F)  History of ESRD 2/2 Tacrolimus toxicity on HD (MWF). With soft blood pressures, placed RIJ (6/20/2024) and started CRRT (6/20/2024).  - Nephrology consulted and following for ESRD   - Currently off CRRT   - iHD yesterday, concern for post HD fistula bleeding- US of Left arterial venous fistula to evaluate for patency, then IR afterwards if issue  - Net +1.5L yesterday  - Plan for iHD today today with 1-2L UF planned  - Renally-dosed medications  -BMP daily  - Patient was on CRRT, now switched back to iHD 7/4 via LUE brachiocephalic access. Single episode of prolonged bleeding (20 minutes) after iHD 7/4.   - US of AVF 7/5: arterial inflow patent without inflow stenosis, normal diameter of anastamosis, venous outflow elevated velocity at subclavian- suggestive of recurrent stenosis in venous outflow (area of prior angioplasty in March), IR consulted       Endocrine:  # Risk for hyperglycemia with stress-dose steroids  Stress dose steroids discontinued, resume PTA prednisone 5 mg daily  BG 130s  - NPH 8U BID  -CTM     ID:  # HAP, Stenotrophomonas maltophilia, Enterococcus faecalis, Aspergillus, and Candida guilliermondi complex  Presented to the ED on 6/18/2024 with SOB and hypercapnic respiratory failure. Found on bronchoscopy (6/19) to have RML obstruction by narrowing bronchus intermedius as well as copious mucopurulent secretions/mucus plugging. Previously treated  for Stenotrophonomas/aspergillus pneumonia in 11/2023 with subsequent sputum culture (2/2024) negative for both Stenotrophonomas/Aspergillus. Etiology likely repeat Stenotrophomonas infection vs opportunistic infection from colonized microbe.   - Workup  - 6/18 sputum cx: Stenotrophomonas maltophilia (ceftazidime and levofloxacin R)  - 6/19 BAL cx: Stenotrophomonas maltophilia, Enterococcus faecalis (gentamicin R), Aspergillus (speciation in process)  - 6/21 BAL cx: Stenotrophomonas maltophilia and Enterococcus faecalis VRE  - 6/24 sputum cx: Stenotrophomonas maltophilia, Enterococcus faecalis VRE, and Candida guilliermondi complex  - Transplant pulmonology consult, appreciate recs  - Transplant ID consult, appreciate recs  - Awaiting susceptibilities on Aspergillus per transplant ID  - Discontinue Linezolid and minocycline per pulm transplant and transplant ID  - Present antibiotics   - Continue micafungin 150 mg/day and posaconazole 300 mg/day per pulm transplant (6/25 to present) for candida guillermondii and asergillus ochraceus on prior BAL  - Past antibiotics  - s/p IV minocycline 100mg BD x 14 days total (6/20-7/5)- for stenotrophamonas  - PO linezolid 600 mg BID x 10 days (6/25-7/5)- for VRE in urine and BAL cultures  - S/p ceftazidime (6/25-6/28)    - S/p vancomycin (6/18-6/20)   - S/p zosyn (6/18-21)  - S/p Daptomycin (6/21-6/23)  - S/p Meropenem (6/21-6/24)  - S/p Levofloxacin (6/21-6/23)    - BAL fluid (7/3): pink, hazy, cell counts normal range, fluid sent for viral, bacterial and fungal cultures negative to date, cytology negative for malignancy and organisms; preliminary AFB negative    # Pyuria, Enterococcus faecium VRE and  ESBL E. Coli   Asymptomatic. With progressive IV pressor needs, obtained broad infectious workup which demonstrated UCx (6/19/2024) with Enterococcus faecium VR and ESBL E. Coli. S/p Daptomycin (6/21-6/23) and Meropenem (6/21-6/24).    # Hx Aspergillus PNA (11/2023)  # Hx PJP PNA  (1/2021)  # Hx CMV viremia, recurrent  # Hx EBV viremia  - Transplant ID consult, appreciate recs  PTA posaconazole (Treatment for hx of aspergillus PNA)  PTA azithromycin 250mg qd (CLAD ppx)  PTA bactrim (PJP ppx)     Hematology:    # Acute on Chronic Normocytic Anemia, stable  # Thrombocytopenia, chronic  History of chronic anemia in the setting of kidney disease (baseline Hgb 10-11). Upon admission, Hgb 9. May be bone marrow suppression in the setting of chronic illness; potential contribution by blood loss with CRRT filter changes (~150-200c). Peripheral smear (6/18/2024) without evidence of schistocytes.  - Hb stable- 8.1 today  - Continue to monitor CBC  - Per nephrology, 4000 units epoetin daily    Musculoskeletal:  - PT / OT consult     Skin:  - Sacral Blanching- off load with frequent turns in bed    General Cares/Prophylaxis:    DVT Prophylaxis: subcutaneous heparin  GI Prophylaxis: PPI  Restraints: None  Family Communication:  at bedside  Code Status: Full Code     Lines/tubes/drains:  - PIV x2, midline  - RIJ  - A line- discharge arterial line  - Tracheostomy  - PEG/J    Disposition:  - Medical ICU   - Care coordinator consulted regarding potential LTAC placement likely for next week.    Patient seen and findings/plan discussed with medical ICU staff, Dr. James.    Edin Davis MD  Internal Medicine Resident, PGY-1      Clinically Significant Risk Factors        # Hypokalemia: Lowest K = 3.1 mmol/L in last 2 days, will replace as needed  # Hyponatremia: Lowest Na = 127 mmol/L in last 2 days, will monitor as appropriate      # Hypoalbuminemia: Lowest albumin = 2.2 g/dL at 6/21/2024  4:26 PM, will monitor as appropriate   # Thrombocytopenia: Lowest platelets = 70 in last 2 days, will monitor for bleeding                 #Precipitous drop in Hgb/Hct: Lowest Hgb this hospitalization: 6.9 g/dL. Will continue to monitor and treat/transfuse as appropriate.      # Moderate Malnutrition: based  on nutrition assessment    # Financial/Environmental Concerns: none                  ====================================  INTERVAL HISTORY:   No acute events overnight. Patient had transient episode of MAPs in mid 50s with concordance between manual cuff and arterial line, 500ml LR bolus ordered but not given because resolved without intervention.  Patient reports improved sleep overnight. She denied pain at trach site.  updated at bedside.      OBJECTIVE:   1. VITAL SIGNS:   Temp:  [97.1  F (36.2  C)-98.4  F (36.9  C)] 97.7  F (36.5  C)  Pulse:  [67-94] 86  Resp:  [17-28] 21  BP: (86)/(44) 86/44  MAP:  [56 mmHg-94 mmHg] 94 mmHg  Arterial Line BP: ()/(40-63) 156/63  FiO2 (%):  [40 %-65 %] 40 %  SpO2:  [87 %-100 %] 100 %  Vent Mode: CMV/AC  (Continuous Mandatory Ventilation/ Assist Control)  FiO2 (%): 40 %  Resp Rate (Set): 18 breaths/min  Tidal Volume (Set, mL): 350 mL  PEEP (cm H2O): 5 cmH2O  Resp: 21    2. INTAKE/ OUTPUT:   I/O last 3 completed shifts:  In: 1527 [I.V.:677; NG/GT:340]  Out: -     3. PHYSICAL EXAMINATION:  General: Sitting up in bed, NAD   HEENT: Atraumatic, moist mucous membranes   Pulm/Resp: Ongoing coarse breath sounds, unchanged from prior exam. Increased expiratory wheezing, stable. Good air movement throughout.   CV: RRR, no m/r/g   Abdomen: Soft, non-distended, non-tender, bowel sounds present.  Ext: Trace bilateral LE edema, pulses 2+ radial, pedal  Incisions/Skin: No rashes or lesions, trach in place with oozing on gauze, no signs of bleeding  Neuro: Awake and alert, follows 1-step and 2-step commands and nods and shakes head to questions, moving all extremities equally    4. LABS:   Arterial Blood Gases   Recent Labs   Lab 06/30/24  1537   PH 7.38   PCO2 41   PO2 154*   HCO3 24     Complete Blood Count   Recent Labs   Lab 07/06/24  0551 07/05/24  0339 07/04/24  0335 07/03/24  0422   WBC 5.2 4.7 6.8 6.5   HGB 8.1* 7.4* 8.4* 8.7*   PLT 75* 70* 94* 94*     Basic Metabolic  Panel  Recent Labs   Lab 07/06/24  0551 07/06/24  0550 07/06/24  0109 07/05/24  2109 07/05/24  0342 07/05/24  0339 07/04/24 2023 07/04/24  1631 07/04/24  0340 07/04/24  0335   *  --   --   --   --  131*  --  135  --  132*   POTASSIUM 3.7  --   --   --   --  3.5  --  3.1*  --  3.6   CHLORIDE 95*  --   --   --   --  100  --  101  --  103   CO2 21*  --   --   --   --  23  --  23  --  18*   BUN 55.5*  --   --   --   --  34.9*  --  26.4*  --  67.4*   CR 2.34*  --   --   --   --  1.57*  --  1.18*  --  2.30*   * 126* 118* 156*   < > 88   < > 164*   < > 143*    < > = values in this interval not displayed.     Liver Function Tests  Recent Labs   Lab 07/02/24  0359 07/01/24  1637 07/01/24  0346 06/30/24  1527 06/30/24  0332   AST 26  --  25  --  26   ALT 37  --  36  --  38   ALKPHOS 176*  --  173*  --  178*   BILITOTAL 0.6  --  0.6  --  0.8   ALBUMIN 2.8* 2.7* 2.6* 2.7* 2.6*     Coagulation Profile  No lab results found in last 7 days.      5. RADIOLOGY:   Recent Results (from the past 24 hour(s))   IR Gastro Jejunostomy Tube Placement    Narrative    PROCEDURE: Gastrostomy tube placement    Procedural Personnel  Attending physician(s): Edin Rodriguez  Fellow physician(s): Chrissy Lobo  Resident physician(s): None  Advanced practice provider(s): None    Pre-procedure diagnosis: Cystic fibrosis  Post-procedure diagnosis: Same  Indication: Nutritional support  Additional clinical history: None    Complications: No immediate complications.      Impression    IMPRESSION:    Percutaneous placement of 18 [place] push-type gastrojejunostomy tube.    Plan:     Nothing by mouth for 4 hours.   _______________________________________________________________    PROCEDURE SUMMARY:  - Gastrostomy tube placement under fluoroscopic guidance  - Additional procedure(s): None    PROCEDURE DETAILS:    Pre-procedure  Consent: Informed consent for the procedure including risks, benefits  and alternatives was obtained and time-out  was performed prior to the  procedure.  Preparation: The site was prepared and draped using maximal sterile  barrier technique including cutaneous antisepsis.    Anesthesia/sedation  Level of anesthesia/sedation: Moderate sedation (conscious sedation)  Anesthesia/sedation administered by: Independent trained observer  under attending supervision with continuous monitoring of the  patient?s level of consciousness and physiologic status  Total intra-service sedation time (minutes): 40    Gastrojejunostomy tube placement  The liver margin was localized by ultrasound. An indwelling  orogastric/nasogastric tube was already in place. Local anesthesia was  administered. The stomach was inflated with air. 2 T-fasteners were  placed under fluoroscopic guidance. The stomach was accessed and a  wire was placed. A Kumpe catheter and a Glidewire were used to access  the proximal jejunum. The tract was dilated, the gastrojejunostomy  tube was advanced into the proximal jejunum, and postpyloric position  was confirmed with contrast injection. The tube was capped.  Gastrojejunostomy tube placed: 18 [place]  Glucagon administered intravenously: Yes  Internal catheter securement: Balloon  External catheter securement: Retention disc    Contrast  Contrast agent: Visipaque 320  Contrast volume (mL): 10    Radiation Dose  Fluoroscopy time (minutes): 6    Reference air kerma (mGy): 16.2   Kerma area product (uGy-m2): 270.67     Additional Details  Additional description of procedure: None  Acute procedural success: Yes  Registry event: V/3/f  Device used: None  Equipment details: None  Unique Device Identifiers: Not available  Specimens removed: None  Estimated blood loss (mL): Less than 10  Standardized report: [email or web address]_v3.1    Attestation  Signer name: CLAUDINE ESTRADA MD  I attest that I was present for the entire procedure. I reviewed the  stored images and agree with the report as written.     US Ext Arterial Venous  Dialys Acs Graft    Narrative    Exam: Duplex ultrasound of the left extremity dialysis fistula dated  7/5/2024 6:26 PM    Comparison examination: Fistulogram 3/5/2024    Clinical history: Prolonged bleeding - evaluate patency    Technique: B-mode (grayscale) and duplex Doppler ultrasound of the  fistula including the arterial inflow through the venous outflow,  including any incidental findings. Velocity measurements obtained with  angle of insonation at or below 60 degrees. Flow volumes obtained in a  segment of the venous outflow.    Findings:    Left upper extremity    Innominate and internal jugular vein on the left were not seen due to  tracheostomy collar.    Maxillary artery: 168 cm/s  Brachial artery below the anastomosis: 102 cm/s    Graft at the inflow anastomosis: 226 cm/s measuring 0.5 cm in diameter  Graft below inflow anastomosis: 416 cm/s  Graft in the upper arm: 162 cm/s  Graft in the mid arm: 110 cm/s  Graft in the antecubital fossa: Flow volume of 606 cc/m peak systolic  velocity of 101 cm/s  Graft in the antecubital fossa outflow side: 74 cm/s  Graft below the outflow anastomosis: 104 cm/s  Craft at the outflow anastomosis: 147 cm/s with a diameter of 0.5 cm    Cephalic vein above the outflow anastomosis: 145 cm/s  Cephalic vein in the mid arm: 160 cm/s; flow volume of 825 cc/m  Cephalic vein in the upper arm: 68 cm/s  Cephalic vein in the shoulder: 132 cm/s  Cephalic vein near the subclavian confluence: 175 cm/s  Cephalic vein at the subclavian confluence: 416 cm/s  Lateral subclavian vein: 77 cm/s  Mid subclavian vein: 178 cm/s      Impression    Impression:  1. Arterial inflow: Patent without inflow stenosis.    2. Fistula anastomosis evaluation: Normal diameter, without evidence  of stenosis.    3. Venous outflow: Focal elevation of velocity at the cephalic  vein-subclavian confluence, suggesting recurrent stenosis in this  region after angioplasty on 3/5/2024.    I have personally reviewed  the examination and initial interpretation  and I agree with the findings.    SANDY CURRY MD         SYSTEM ID:  [id number]

## 2024-07-06 NOTE — PROGRESS NOTES
[facility] PALLIATIVE CARE PROGRESS NOTE        Chief Complaint:  respiratory failure with trache     Recommendations & Counseling      GOALS OF CARE:   Life-prolonging without limits at this time  Kecia does not want any treatment that will only prolong her dying if there is no reasonable hope of recovery to an active life.     ADVANCE CARE PLANNING:  Patient has an advance directive dated January 2018.  Primary Health Care Agent  Ranjan.  Alternate(s) three daughters as co-alternatives.   There is no POLST form on file, defer to patient and/or next of kin for decisions   Code status: Full Code     MEDICAL MANAGEMENT:   We are not actively managing symptoms at this time.     PSYCHOSOCIAL/SPIRITUAL SUPPORT:  Family  and three daughters, children in law and grand children  Friends many back home in [place], [place]     Palliative Care will continue to follow during this admission and will be available as outpatient clinic as well. Thank you for the consult and allowing us to aid in the care of Kecia Blue.     These recommendations have been discussed with primary team via this note.     TTS: I spent a total of 35 minutes  on the date of service reviewing the medical record, consulting with the medical providers and assessing the patient, coordinating care, performing other activities listed above and completing documentation.     Case Rey MD  [facility], Palliative Care  Securely message with the [employer] Web Console (learn more here) or  Text page via [employer] Paging/Directory          Key Palliative Symptom Data:  We are not helping to manage these symptoms currently in this patient.    Patient is on opioids: bowels not assessed today.    Prognosis, Goals, or Advance Care Planning was addressed today with: No. No changes to previous [facility]    Mood, coping, and/or meaning in the context of serious illness were addressed today: Yes. With  Ranjan mainly    Chart Review/discussion with  clinical unit members: Discussed briefly with MICU 2 team.    Palliative Encounter Summary/Comments:  I saw Kecia briefly in her room.  She is much more awake and alert than on 7/4/2024.  She denied any complaints or concerns.    I then met with Ranjan outside her room to check in on him and offer what support I could.  He is a little frazzled trying to find a short term apartment lease while Kecia is in LTACH here in the  and he's not sure if she'll be going to [place] or [facility] in [place]. His preference is [place].  There is also one in [place], [place], but it will cost $9200 OOP to transport Kecia there. While we were speaking he had a couple of calls about apartments.  He is also going to investigate [employer]NB opportunities and has questions about how that works.    He is also concerned that Kecia isn't spending much time doing pressure support trials and he worries about her getting weaker.    I was able to provide reassurance for Ranjan that if Kecia does go to [place] that I have PC colleagues who staff there and I've worked with one of the pulmonary APPs there a lot and have a lot of confidence in his skill.    Ranjan also wondered if he could tour [place] and I asked him to work with the ICU SW to see if she could arrange  visit.    I will try to see Ranjan tomorrow to provide more emotional support.    35 minutes spent on the date of the encounter doing chart review, history and exam, patient education & counseling, documentation and other activities as noted above.    Case Rey MD MS FAAFP, CAQHPM  [facility] Palliative Care [facility]  Securely message with the [employer] Web Console (learn more here) or  Text page via [employer] Paging/Directory

## 2024-07-06 NOTE — PROGRESS NOTES
ICU Daily Rounding Checklist     Checklist Response Notes   Can sedation be reduced?  NA    Can analgesia be reduced? NA    Is delirium being assessed, addressed and prevented? Yes    Spontaneous awakening trial and/or Spontaneous breathing trial candidate?  Yes    Total fluid balance goal reviewed?  Yes ESRD on dialysis; plan to dialyze today    Is the patient at goals for lung protective ventilation? Yes    Head of bed elevation (30 degrees)? Yes    Skin breakdown assessment (prevention) completed? Yes    Is enteral nutrition at goal? Yes    Is blood glucose at goal? Yes    Deep venous thrombosis prophylaxis? Yes SBQ heparin      Gastric ulcer prophylaxis?  Yes If coagulopathy (INR-1.5 PTT2x normal. Ph<50k), mechanical ventilation 48hr, history of GI bleed/ulcer within past year. TBL, SCI, or burn, or if >= 2 minor risk factors (sepsis, ICU stay 1 week, occult GI bleed > 6 days. glucocorticoid therapy, NSAID use, antiplatelet use)   Can Antibiotics be narrowed or discontinued? Yes Finished Linezolid/minocycline   Early mobility candidate and physical therapy consulted? Yes    Is rivas catheter needed? NA    Is central venous/arterial catheter needed? Yes/No Removing A-line, keeping HD/CVC    Has the family been updated? Yes    Are the patient's goals of care and code status current? Yes

## 2024-07-06 NOTE — PROGRESS NOTES
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  The patient was admitted on 24 for progressive hypoxia with dyspnea and worsening hypercapnia in ED requiring intubation likely d/t post-obstructive PNA 2/2 right bronchus stenosis.  Bronch confirming severe stenosis of right anastomosis with extensive RLL plugging.  IP bronch () with laser tissue debulking RMB stenosis, airway balloon dilation of RMB and BI, and placement of stent RMB to BI and bifurcating stent in RUL.  S/p volume resuscitation and transition to CRRT  for hypotension, stopped .  Increased agitation/delirium on  with increasing desaturation led to need for increased sedation.  Recurrence of paroxysmal afib with RVR first noted .  Remains intubated with pulmonary edema on imaging and septic shock requiring pressor support, limited/variable tolerance of PST.  She underwent Trach on 7/3/24. She is doing PST but requiring higher pressures for PST. GJ was placed on 2024.      Today's recommendations:  - Continue antimicrobial regimen per transplant ID  - Repeat CXR on Monday (needs to be ordered).  - Prospera (7/3) pending  - DSA (7/3) pending  - Tacrolimus level to be ordered   - Prednisone chronic dosing to resume if hydrocortisone  <20 mg daily  - Bactrim MWF for PJP ppx (monitor need to adjust pending HD plan)  - Awaiting susceptibilities on Aspergillus per transplant ID  - Agree with Tx ID to stop Linezolid/minocycline.  - CMV weekly monitoring (qTuesday), continue VGCV ppx (renally dosed, monitor need to adjust pending HD plan) with tentative plan for 3 weeks beyond completion of stress dose steroids      Septic shock:  Acute on chronic hypoxic/hypercapneic respiratory failure:  Post-obstructive multi-lobar PNA:  Right bronchial stenosis with RML collapse: Admitted with 2 weeks of progressive hypoxia, dyspnea, congested cough, and fatigue.  Chronic hypoxia with 2L NC, increased from 3 to 4L over this time with acute decompensation on day of admission associated with hypercapnia.  VBG 7/17/85 in ED s/p intubation.  IP bronch 2/15 s/p tissue debulking without stent placement.  Negative ID workup: respiratory panel, COVID, MRSA nares, PJP, Legionella, Strep pneumoniae, cocci, Histo, CrAg, fungitell x2, and A. galactomannan (blood).  IgG WNL.  Procal mildly elevated at 0.24 initially (then elevated to 1.77 6/20), LA normal, but febrile to 100.7.  TTE on admission grossly normal.  CT with RUL/RLL consolidative opacities, RML collapse, and narrowing of BI and distal RLL bronchus.  Started on norepi 6/19.  Bronch per MICU (6/19) with severe stenosis starting at right anastomosis, inspection with smaller scope revealing for extensive RLL mucous plugging.  Bronch per IP (6/20) with laser tissue debulking RMB stenosis, airway balloon dilation of RMB and BI, and placement of stent RMB to BI and bifurcating stent in RUL.  Intermittent/low grade fevers persisting.  Respiratory cultures with Steno, VSE, VRE, Aspergillus ochraceus, and Elidia guilliermondii.  Chest CT (6/29) with multifocal panlobar opacities and small bibasilar pleural effusions.  Remains on full vent support, limited/variable tolerance of PST, Trach'ed on 7/3/24 by ENT.  - The Etiology  of GGO noted on Chest CT, BAL 7/3/24 with >80% mono/macros but cultures positive for Steno. She has grown Steno all her life. She has been aggressively dialyzed but there has been no improvement. Checking Immuknow today. If it is higher might support a trial of steroids.   - BAL fluid cell count diff shows it is mostly mono/macro making infection/rejection and  less likely.    - If repeat CXR is remains unchanged on Monday, W  She continues to struggle doing PST, but able to do 12/5 for 3.5hrs on 7/4/24. Unable to do any PST on 7/5/24.    - ABX: linezolid (6/25-7/5), and minocycline (6/20-7/5, Steno) per transplant ID; s/p ceftazidime (6/25-6/28), meropenem (6/21-6/24), daptomycin (6/21-6/24), levofloxacin (6/21-6/23), Zosyn (6/18-6/21), azithromycin 500 mg daily (6/18-6/20), and vancomycin (6/18)   - Nebs: levalbuterol QID, Mucomyst 10% BID alternating with 3% HTS (6/22), will need to be on NS nebs BID upon discharge per IP  - Vent management and weaning per MICU  - Stress dose steroids per MICU (started 6/21 - 7/6)  - Palliative care consultation appreciated.     S/p bilateral sequential lung transplant (BSLT) for ILD:   Progressive CLAD: Most recent OP visit in February.  DSA negative 6/19 (last positive noted 2018).  Repeat ImmuKnow (6/19) 87, very low but IST adjustment deferred per Dr. Graham given acuity and steroids (after ImmuKnow level).  Repeat Prospera remains high at 2.3 on 6/19, may be artificially elevated with current infection, but also notably high at 2.42 on 2/14.  - Repeat Prospera (7/3) pending  - Repeat DSA (7/3) pending  - Repeat ImmuKnow in 4-6 weeks.  - PTA Singulair, azithromycin, and Breo inhaler (resume once extubated)     Immunosuppression:  On 2-drug IST since November d/t leukopenia and recurrent infections  - Tacrolimus 0.8 mg BID.  Goal level 8-10.  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  - ABX and infectious work up as above     Aspergillus ochraceus:  H/o Aspergillus empyema:  S/p empyema drainage and ampho B instillation.  Previously on voriconazole, transitioned to posaconazole and managed by transplant ID as OP with last visit 3/13.  Calcified fungal elements remain with additional recurrent effusion (likely associated with fluid disturbances r/t iHD), but surgical intervention previously deferred d/t risk.  Posaconazole level 2.5 on 6/19.  - Awaiting susceptibilities on Aspergillus (bronch culture 6/19) per transplant ID  - Posaconazole 300 mg daily chronically, micafungin 150 mg daily (6/25) per transplant ID     H/o CMV viremia: Recurrent CMV viremia historically.  VGCV ppx most recently stopped 4/12.  Recent CMV low positive at 46 (6/12), <35 (6/18), and 39 (6/25, 7/2).  - CMV weekly monitoring (qTuesday) with steroid increase  - VGCV ppx (renally dosed, monitor need to adjust pending HD plan) given stress dose steroids (6/24) with tentative plan for 3 weeks beyond completion of stress dose steroids      EBV viremia: S/p rituximab 12/13/23 x1 dose.  Most recent EBV negative 6/18.     Dilated CBD:  Suspected acute cholecystitis: MRCP (6/29) with CBD dilation with stones and cholelithiasis without cholecystitis.  GI signed off 6/30, recommending general surgery consult if RUQ pain persists.     ESRD on iHD: Kidney evaluation started as OP.  On qMWF iHD schedule, no missed sessions.  Transitioned to CRRT on 6/20, stopped 7/2.  Management per renal and MICU with fluid removal as able.      VRE urine culture: Noted 6/19, >100k GNB and VRE faecium, ABX as above with management per transplant ID and MICU    We appreciate the excellent care provided by the MICU team.  Recommendations communicated via in person rounding and this note.  Will  "continue to follow along closely, please do not hesitate to call with any questions or concerns.     Subjective & Interval History:     PS 12/5 40-50% x3hrs 38mins two days ago but none yesterday due to GJ placement. Remains full vent support. Weight is below her dry weight.  Loose stools, no nausea.      Review of Systems:     ROS as above otherwise significantly limited due to intubation and sedation.     Physical Exam:     All notes, images, and labs from past 24 hours (at minimum) were reviewed.    Vital signs:  Temp: 98.5  F (36.9  C) Temp src: Axillary BP: (!) 148/59 Pulse: 83   Resp: 21 SpO2: 96 % O2 Device: Mechanical Ventilator Oxygen Delivery: 6 LPM Height: 160 cm (5' 3\") Weight: 52.6 kg (115 lb 15.4 oz)  I/O:     Intake/Output Summary (Last 24 hours) at 7/6/2024 1651  Last data filed at 7/6/2024 1600  Gross per 24 hour   Intake 1540 ml   Output 2000 ml   Net -460 ml     Constitutional: Sitting up in chair, in no apparent distress.   HEENT: Eyes with pink conjunctivae, anicteric.  Orally intubated via ETT.  Nasal FT.  PULM: Good air flow bilaterally.  Coarse crackles t/o.  No rhonchi, no wheezes.  Non-labored breathing on full vent support.  CV: Normal S1 and S2.  RRR.  No murmur, gallop, or rub.  No peripheral edema.   ABD: NABS, soft, nondistended.    MSK: Moves all extremities.  + muscle wasting.   NEURO: Opens eyes to voice, able to answer some yes/no questions by nodding/shaking head.  SKIN: Warm, dry, fragile.  Bilateral toes reddened.  PSYCH: Calm.     Data:     LABS    CMP:   Recent Labs   Lab 07/06/24  1149 07/06/24  1143 07/06/24  0740 07/06/24  0551 07/05/24  0342 07/05/24  0339 07/04/24  2023 07/04/24  1631 07/04/24  0340 07/04/24  0335 07/03/24  0429 07/03/24  0422 07/02/24  0409 07/02/24  0359 07/01/24  1640 07/01/24  1637 07/01/24  0353 07/01/24  0346 06/30/24  1537 06/30/24  1527 06/30/24  0338 06/30/24  0332   NA  --  131*  --  127*  --  131*  --  135  --  132*   < > 137  --  138  --  137  " --  138  --  137  --  137   POTASSIUM  --  3.3*  --  3.7  --  3.5  --  3.1*  --  3.6   < > 3.3*  --  3.6  --  3.8  --  3.6  --  3.8  --  3.9   CHLORIDE  --  97*  --  95*  --  100  --  101  --  103   < > 105  --  108*  --  105  --  107  --  106  --  107   CO2  --  25  --  21*  --  23  --  23  --  18*   < > 21*  --  22  --  23  --  22  --  21*  --  21*   ANIONGAP  --  9  --  11  --  8  --  11  --  11   < > 11  --  8  --  9  --  9  --  10  --  9   * 151* 133* 131*   < > 88   < > 164*   < > 143*   < > 154*   < > 161*   < > 155*   < > 170*   < > 173*   < > 192*   BUN  --  26.6*  --  55.5*  --  34.9*  --  26.4*  --  67.4*   < > 47.6*  --  28.2*  --  29.2*  --  28.1*  --  28.1*  --  29.0*   CR  --  1.28*  --  2.34*  --  1.57*  --  1.18*  --  2.30*   < > 1.61*  --  0.91  --  0.95  --  0.89  --  0.88  --  0.96*   GFRESTIMATED  --  47*  --  23*  --  37*  --  52*  --  23*   < > 36*  --  71  --  68  --  73  --  74  --  67   CHELSEY  --  8.5*  --  8.0*  --  7.8*  --  8.1*  --  7.7*   < > 8.0*  --  8.1*  --  7.6*  --  7.8*  --  7.9*  --  7.4*   MAG  --  1.7  --  2.1  --  2.0  --  1.9  --  2.6*  --  2.6*  --  2.5*  --  2.4*  --  2.5*  --  2.5*  --  2.4*   PHOS  --   --   --  5.1*  --  3.4  --   --   --  7.0*  --  6.1*  --  4.8*  --  4.7*  --  4.7*  --  4.6*  --  4.8*   PROTTOTAL  --   --   --   --   --   --   --   --   --   --   --   --   --  5.4*  --   --   --  5.0*  --   --   --  4.9*   ALBUMIN  --   --   --   --   --   --   --   --   --   --   --   --   --  2.8*  --  2.7*  --  2.6*  --  2.7*  --  2.6*   BILITOTAL  --   --   --   --   --   --   --   --   --   --   --   --   --  0.6  --   --   --  0.6  --   --   --  0.8   ALKPHOS  --   --   --   --   --   --   --   --   --   --   --   --   --  176*  --   --   --  173*  --   --   --  178*   AST  --   --   --   --   --   --   --   --   --   --   --   --   --  26  --   --   --  25  --   --   --  26   ALT  --   --   --   --   --   --   --   --   --   --   --   --   --  37  --   --  "  --  36  --   --   --  38    < > = values in this interval not displayed.     CBC:   Recent Labs   Lab 07/06/24  0551 07/05/24  0339 07/04/24  0335 07/03/24  0422   WBC 5.2 4.7 6.8 6.5   RBC 2.59* 2.34* 2.65* 2.78*   HGB 8.1* 7.4* 8.4* 8.7*   HCT 25.5* 23.0* 26.4* 27.2*   MCV 99 98 100 98   MCH 31.3 31.6 31.7 31.3   MCHC 31.8 32.2 31.8 32.0   RDW 19.4* 18.8* 18.7* 18.4*   PLT 75* 70* 94* 94*       INR: No lab results found in last 7 days.    Glucose:   Recent Labs   Lab 07/06/24  1149 07/06/24  1143 07/06/24  0740 07/06/24  0551 07/06/24  0550 07/06/24  0109   * 151* 133* 131* 126* 118*       Blood Gas:   Recent Labs   Lab 06/30/24  1537   O2PER 45       Culture Data No results for input(s): \"CULT\" in the last 168 hours.    Virology Data:   Lab Results   Component Value Date    FLUAH1 Not Detected 06/18/2024    FLUAH3 Not Detected 06/18/2024    PX0703 Not Detected 06/18/2024    IFLUB Not Detected 06/18/2024    RSVA Not Detected 06/18/2024    RSVB Not Detected 06/18/2024    PIV1 Not Detected 06/18/2024    PIV2 Not Detected 06/18/2024    PIV3 Not Detected 06/18/2024    HMPV Not Detected 06/18/2024    HRVS Negative 01/24/2021    ADVBE Negative 01/24/2021    ADVC Negative 01/24/2021    ADVC Negative 12/23/2020    ADVC Negative 10/07/2019       Historical CMV results (last 3 of prior testing):  Lab Results   Component Value Date    CMVQNT <35 (A) 06/18/2024    CMVQNT Not Detected 03/05/2024    CMVQNT Not Detected 12/19/2023     Lab Results   Component Value Date    CMVLOG 1.6 07/02/2024    CMVLOG 1.6 06/25/2024    CMVLOG <1.5 06/18/2024       Urine Studies    Recent Labs   Lab Test 06/19/24  1214 01/24/21  1729   URINEPH 6.5 5.0   NITRITE Negative Negative   LEUKEST Large* Moderate*   WBCU >182* 34*       Most Recent [place] Pulmonary Function Testing (FVC/FEV1 only)  FVC-Pre   Date Value Ref Range Status   02/14/2024 0.85 L    12/19/2023 1.04 L    11/21/2023 0.85 L    10/24/2023 0.96 L      FVC-%Pred-Pre   Date " Value Ref Range Status   02/14/2024 29 %    12/19/2023 36 %    11/21/2023 29 %    10/24/2023 33 %      FEV1-Pre   Date Value Ref Range Status   02/14/2024 0.80 L    12/19/2023 0.89 L    11/21/2023 0.78 L    10/24/2023 0.87 L      FEV1-%Pred-Pre   Date Value Ref Range Status   02/14/2024 34 %    12/19/2023 38 %    11/21/2023 33 %    10/24/2023 37 %        IMAGING    Recent Results (from the past 48 hour(s))   XR Chest Port 1 View    Narrative    EXAM: XR CHEST PORT 1 VIEW  7/2/2024 3:44 PM      HISTORY: Lung tx, intubated. CT on 6/29 showed GGO    COMPARISON: CT 6/29/2024, 6/26/2024 radiograph    FINDINGS: Single view of the chest. An endotracheal tube terminates in  the midthoracic trachea. Postoperative changes in the chest and intact  median sternotomy wires. Enteric tube terminates in the stomach and  duodenum. Right IJ central venous catheter tip terminates at the  superior cavoatrial junction. Right bronchial stent is unchanged.   Trachea is midline. Cardiac silhouette is stable. Diffuse hazy opacity  throughout the left greater than right lungs, slightly improved  compared to 6/26/2024. No pleural effusion. No pneumothorax.      Impression    IMPRESSION:   1. Endotracheal tube terminates in the midthoracic trachea. Additional  support devices are stable.  2. Hazy opacities throughout the left greater the right lungs, overall  improved compared to radiograph dated 6/26/2024.    I have personally reviewed the examination and initial interpretation  and I agree with the findings.    TERRI ISSA MD         SYSTEM ID:  [id number]

## 2024-07-06 NOTE — PLAN OF CARE
ICU End of Shift Summary. See flowsheets for vital signs and detailed assessment.    Changes this shift: MAPs less than 65, team notified, bolus ordered but pressures improved without intervention so bolus not administered.     Plan:  Pressure support trials. HD run today.     Goal Outcome Evaluation:      Plan of Care Reviewed With: patient    Overall Patient Progress: improvingOverall Patient Progress: improving    Outcome Evaluation: see note      Problem: Lung Transplant  Goal: Blood Glucose Level Within Targeted Range  Outcome: Progressing  Intervention: Optimize Glycemic Control  Recent Flowsheet Documentation  Taken 7/6/2024 0000 by Homero Simon, RN  Glycemic Management: blood glucose monitored  Taken 7/5/2024 2000 by Homero Simon, RN  Glycemic Management: blood glucose monitored

## 2024-07-06 NOTE — PROGRESS NOTES
HEMODIALYSIS TREATMENT NOTE    Date: 7/6/2024  Time: 11:20 AM    Data:  Pre Wt: 52.4 kg (115 lb 8.3 oz) bedwieght  Desired Wt: 52.4 kg  kg   Weight change: 2 kg  Ultrafiltration - Post Run Net Total Removed (mL): 2000 mL  Vascular Access Status: Fistula  patent  Dialyzer Rinse: Streaked  Total Blood Volume Processed: 72.1 L   Total Dialysis (Treatment) Time: 3hrs   Dialysate Bath: K 3, Ca 3  Heparin: None  Epogen 4000units  Lab:   No    Interventions/Assessment:  Patient was dialyzed 3hrs via AVF on left upper arm with 15g needle and blood flow rate of 450ml/min. Net fluid removal 2 kg. Patient is tolerated HD run well. BP stable throughout the run. AVF achieved hemostasis in 15 minutes. Patient has no complaints related to dialysis. Post dialysis hand off report is given to iCU RN.            Plan:    Next HD run per renal team.

## 2024-07-06 NOTE — PLAN OF CARE
ICU End of Shift Summary. See flowsheets for vital signs and detailed assessment.    Changes this shift: Neuro status unchanged. Denies pain. In and out of afib with rates in 90s-100s but self converts each time, otherwise sinus rhythm 80s-90s. MAP >65. Arterial line pulled today. FiO2 weaned down to 30%. Failed 1st attempt at PS 14/5, 2nd attempt in afternoon was 10/5 50% for ~40 minutes with a lot of encouragement. HD run today - 2L off. BM x2. K replaced x1. Pt and spouse updated at bedside.     Plan: Continue to PS. Work with PT/OT. Continue with POC.       Goal Outcome Evaluation:      Plan of Care Reviewed With: patient, spouse    Overall Patient Progress: improvingOverall Patient Progress: improving    Outcome Evaluation: See note

## 2024-07-06 NOTE — PROVIDER NOTIFICATION
07/06/24 1151   Weaning Assessment   Spontaneous Respiratory Rate 40 breaths/min   Spontaneous Tidal Volume (mL) 207 mL   Spontaneous Minute Ventilation 7.6 mL   RSBI 193.24   Weaning trial discontinued due to: RR>35 for >5min  (pt feeling very SOB)   Wean Start Time  1139   Wean End Time  1151   Wean Time Calculated (min) 12     Pt completed a 12 minute PS Trial 14/5 this morning after receiving dialysis. Vt were shallow as listed above with a RR of 40. Pt was stressed and feeling SOB.     07/06/24 1615   Weaning Assessment   Spontaneous Respiratory Rate 38 breaths/min   Spontaneous Tidal Volume (mL) 343 mL   Spontaneous Minute Ventilation 13 mL   RSBI 110.79   Weaning trial discontinued due to: RR>35 for >5min  (increased SOB)   Wean Start Time  1540   Wean End Time  1615   Wean Time Calculated (min) 35     Pt only required a PS 10/5 40% this evening. Pt had her , physician and respiratory therapy at bedside reassuring her and coaching her to take slow deep breaths. Pt having a lot of anxiety but was redirectable. Pt was able to complete a 35 minute PS trial. RR ranged from the high 20's to the high 40's. Please see flowsheets for further information.

## 2024-07-07 NOTE — PROVIDER NOTIFICATION
07/07/24 0905   Weaning Assessment   Spontaneous Respiratory Rate (S)  42 breaths/min   Spontaneous Tidal Volume (mL) 205 mL   Spontaneous Minute Ventilation 9.17 mL   RSBI 204.88   Weaning trial discontinued due to: RR>35 for >5min  (pt feeling very SOB)   Wean Start Time  0805   Wean End Time  0905   Wean Time Calculated (min) 60        07/07/24 1410   Weaning Assessment   Spontaneous Respiratory Rate 40 breaths/min   Spontaneous Tidal Volume (mL) 218 mL   Spontaneous Minute Ventilation 7.6 mL   RSBI 183.49   Weaning trial discontinued due to: RR>35 for >5min   Wean Start Time  1240   Wean End Time  1410   Wean Time Calculated (min) 90     Pt completed two PS trials as ordered. Both PS trials were on a PS 10/+5 40%. Please see flowsheets for further information.

## 2024-07-07 NOTE — PLAN OF CARE
ICU End of Shift Summary. See flowsheets for vital signs and detailed assessment.    Changes this shift: No neuro changes. Denies pain. Afib for around 5 minutes today but self converted, otherwise Sinus rhythm 80s-90s. MAP >65. PS 10/5 40% x2, one hour in the morning and one and a half in in the afternoon. Needs a lot of encouragement during the PS trials. BM x1. Pt and spouse updated at bedside.     Plan:  HD tomorrow. PS as tolerated. Continue with POC.    Goal Outcome Evaluation:      Plan of Care Reviewed With: patient, spouse    Overall Patient Progress: improvingOverall Patient Progress: improving    Outcome Evaluation: See note

## 2024-07-07 NOTE — PROGRESS NOTES
MEDICAL ICU PROGRESS NOTE  07/07/2024      Date of Service (when I saw the patient): 07/07/2024    ASSESSMENT: Kecia Blue is a 63 year old female with PMH ILD s/p bilateral lung transplant (2018) c/b recurrent right bronchial stenosis s/p repeat balloon dilation/stenting, repeat opportunistic pneumonia (PJP 2021, aspergillus/stenotrophomonas 11/2023) and viremias (EBV, CMV), and ESRD 2/2 tacrolimus toxicity on HD who was admitted on 6/18/2024 for acute hypercapnic respiratory failure 2/2 tracheal stenosis and pneumonia.  Status post airway stent placement by IP on 6/20. Hospital course complicated by hypotension, delirium, and prolonged respiratory failure.    Changes today:  - repeat CXR tomorrow AM  - changed timing of metoprolol to around 0700/1900 to determine if blood pressure related  - Continue BID SBT trials on pressure support 10/5 as tolerated  - hopeful discharge to LTACH this week  - monitoring platelets & hemoglobin in the AM  - plan for health psychology consult in AM    PLAN:    Neuro:  #Pain   - Acetaminophen 325mg q4H  - IV fentanyl prn     #Toxic metabolic encephalopathy, improving  Ongoing lethargy in setting of sepsis and delirium in the setting of high-dose steroid therapy. Patient reports improved sleep on current medications. Following commands this morning.   - Quetiapine 50 mg at bedtime   - Ramalteon and trazodone 25mg for sleep   - delirium precautions    #Anxiety  Patient has been having anxiety regarding pressure support trials and weaning from the ventilator.  and patient would like to talk to somebody while in the hospital. Will plan to consult Health Psychology tomorrow (as not here on weekends).   - Health Psychology consult on Monday AM    Pulmonary:  #Acute on chronic hypoxic hypercapnic respiratory failure, improving  #HAP, Stenotrophomonas maltophilia, Enterococcus faecalis, and Candida guilliermondi complex  #Severe pulmonary edema  #Acute respiratory distress  syndrome, resolving  Presented to the ED on 6/18/2024 with acute on chronic hypoxic hypercapnic respiratory failure. Transferred to MICU and intubated on overnight on 6/18. Workup significant for Stenotrophomonas, Enterococcus, and Candida pneumonia. Course was c/b progression to ARDS (6/21-22) with elevated plateau pressures. Pt remain intubated d/t ongoing pulmonary edema iso ESRD on iHD. CXR (6/26) with similar pulmonary opacities compared to 6/24, left > right.  The etiology of this groundglass opacity was unclear but ddx includes rejection vs. infection vs. volume overload. Ongoing course breath sounds with some expiratory wheezes on exam.  - Antibiotics, as below  - Volume management with iHD  - Mechanical ventilation, wean as able  - Daily SBT (AM and PM) on pressure support 10/5  - s/p tracheostomy 7/4  - Pulmonary toilet  - Mucomyst BID  - Hypertonic saline BID, alternate with mucomyst  - Albuterol neb QID  - Repeat CXR on Monday, 7/8 per pulm transplant    Vent Mode: (S) CMV/AC  (Continuous Mandatory Ventilation/ Assist Control)  FiO2 (%): 30 %  Resp Rate (Set): 18 breaths/min  Tidal Volume (Set, mL): 320 mL  PEEP (cm H2O): 5 cmH2O  Pressure Support (cm H2O): 10 cmH2O  Resp: 18    #Recurrent right middle bronchus stenosis s/p dilation and stent (6/20/2024)  Has history (bronchoscopy 2/15/24) demonstrating narrowing of right mainstem transplant anastomosis and bronchus intermedius. CT chest (6/18/2024) and bronchoscopy (6/19/2024) demonstrated occlusion of right middle bronchus. With concern for post-obstructive pneumonia, interventional pulmonology was consulted, and completed bronchoscopy (6/20/2024) with tissue debulking, right middle bronchus balloon dilation to 11 mm, stent placement in right main bronchus, and bifurcating cast placement in right upper bronchus. Plan for saline nebs BID and outpatient bronchoscopy in 6 weeks.   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  - Transplant pulm consulted and following  - Prospera (7/3) pending  - DSA (7/3) pending  - Tacrolimus level to be ordered 7/8  - PTA nebs  - Fluticasone-viilanterol (breo ellipta) every day - HOLD with respiratory infection  - Montelukast every day   - Immunosuppressants per Tx Pulm:   - PTA Tacrolimus (dosing per tx pulm)  - PTA Prednisone 5 mg daily   - PTA azathioprine - HOLD per Transplant pulmonology with repeat infections  - Antibiotic prophylaxis   - Bactrim MWF for PJP ppx (monitor need to adjust pending HD plan)  - CMV weekly monitoring (qTuesday)  - Azithromycin per pulmonary   - Continue VGCV ppx (renally dosed, monitor need to adjust pending HD plan) with tentative plan for 3 weeks beyond completion of stress dose steroids     Cardiovascular:  #Septic shock, improving  On 6/19/2024, developed sepsis (hypotension 80s/50s, tachypnea 24) in the setting of stenotrophomonas pneumonia/enterococcus faecium VRE UTI. Etiology of shock likely distributive iso sepsis; less likely hypovolemic (not pulling volumes with CRRT), medication-associated (weaned off of propofol gtt) or obstructive (low suspicion for PE, echo 6/19 without evidence of cardiac dysfunction).   Pressor requirements slowly improving but are sluggish, driven by low diastolic pressure. Has been having transient episodes of MAPs <65 and into 50s overnight. Attempted to change timing of metoprolol to see if that improves patient's blood pressures, but could just be consistent with when she is sleeping. Episodes self-resolving by next blood pressure check.   - continue PTA prednisone 5 mg daily    Past course:  - Hydrocortisone 50mg q6H (6/21-6/26); 50 mg BID (6/26-6/28); 25 mg BID (6/28-7/3), 25  mg daily (7/3-7/5)  - Fludrocortisone 0.1 mg qday (6/21-6/28); 0.05 mg qday (6/28-30)  - Continue midodrine to 20 mg TID to support MAPs           .    #Demand Ischemia, resolved    Presented with elevated troponin (peak 499). Not associated with chest pain or hypoxia. EKG without ST elevations/depressions or T wave inversions. Suspect demand ischemia in the setting of sepsis. Will continue to monitor symptoms and continuous telemetry. EKG 7/2 sinus rhythm with PACs.     #Paroxysmal A-Fib, improved  #Pulmonary Hypertension   History of pulmonary hypertension (45 mmHg, echo 10/2023) in the setting of ILD and paroxysmal afib on PTA metoprolol tartrate BID. Not on anticoagulation for afib.   - continue PTA metoprolol tartrate 25mg BID     GI/Nutrition:  #Nutrition  PEG-J placed by IR, continue tube feeds.   - Nutrition consult  - Tube feeds 30ml/hr; at goal    #Diarrhea, improving  On 6/21, had 8 bowel movements. Occurred in the setting of scheduled bowel regimen and starting new antibiotics (meropenem, levofloxacin). C diff negative.   Slowly improving.   - Holding PRN bowel regimen  - Miralax PRN   - Senna prn  - Trial of Nutrisource Fiber and monitor-3 packets daily per nutrition recs      #Cholelithiasis with gallbladder wall thickening, resolved  During admission patient found to have up trending LFTs and fever in setting of GB wall thickening on CT abdomen/pelvis, now resolved. MRCP completed showing borderline dilated CBD up to 1.1 cm, no significant intrahepatic biliary dilation, no evidence of choledocholithiasis, no evidence of acute cholecystitis.    Renal/Fluids/Electrolytes:  #ESRD on iHD (M,W,F)  History of ESRD 2/2 Tacrolimus toxicity on HD (MWF). With soft blood pressures, placed RIJ (6/20/2024) and started CRRT (6/20/2024). US of AVF 7/5: arterial inflow patent without inflow stenosis, normal diameter of anastamosis, venous outflow elevated velocity at subclavian- suggestive of recurrent stenosis in  venous outflow (area of prior angioplasty in March). IR consulted who noted that IR fistulogram not required at that time, and was able to run iHD on 7/6 off her fistula.   - Nephrology consulted and following for ESRD  - will follow recommendations for need of HD  - Renally-dosed medications  - BMP daily    Endocrine:  #Risk for hyperglycemia with stress-dose steroids  Stress dose steroids discontinued, resume PTA prednisone 5 mg daily.  - NPH 8U BID  -CTM     ID:  #HAP, Stenotrophomonas maltophilia, Enterococcus faecalis, Aspergillus, and Candida guilliermondi complex  Presented to the ED on 6/18/2024 with SOB and hypercapnic respiratory failure. Found on bronchoscopy (6/19) to have RML obstruction by narrowing bronchus intermedius as well as copious mucopurulent secretions/mucus plugging. Previously treated for Stenotrophonomas/aspergillus pneumonia in 11/2023 with subsequent sputum culture (2/2024) negative for both Stenotrophonomas/Aspergillus. Etiology likely repeat Stenotrophomonas infection vs opportunistic infection from colonized microbe.   - Workup  - 6/18 sputum cx: Stenotrophomonas maltophilia (ceftazidime and levofloxacin R)  - 6/19 BAL cx: Stenotrophomonas maltophilia, Enterococcus faecalis (gentamicin R), Aspergillus (speciation in process)  - 6/21 BAL cx: Stenotrophomonas maltophilia and Enterococcus faecalis VRE  - 6/24 sputum cx: Stenotrophomonas maltophilia, Enterococcus faecalis VRE, and Candida guilliermondi complex  - Transplant pulmonology consult, appreciate recs  - Transplant ID consult, appreciate recs  - Awaiting susceptibilities on Aspergillus per transplant ID  - Present antibiotics   - Continue micafungin 150 mg/day and posaconazole 300 mg/day per pulm transplant (6/25 to present) for candida guillermondii and asergillus ochraceus on prior BAL  - Past antibiotics  - s/p IV minocycline 100mg BD x 14 days total (6/20-7/5)- for stenotrophamonas  - PO linezolid 600 mg BID x 10 days  (6/25-7/5)- for VRE in urine and BAL cultures  - S/p ceftazidime (6/25-6/28)    - S/p vancomycin (6/18-6/20)   - S/p zosyn (6/18-21)  - S/p Daptomycin (6/21-6/23)  - S/p Meropenem (6/21-6/24)  - S/p Levofloxacin (6/21-6/23)    - BAL fluid (7/3): pink, hazy, cell counts normal range, fluid sent for viral, bacterial and fungal cultures negative to date, cytology negative for malignancy and organisms; preliminary AFB negative    #Pyuria, Enterococcus faecium VRE and  ESBL E. Coli   Asymptomatic. With progressive IV pressor needs, obtained broad infectious workup which demonstrated UCx (6/19/2024) with Enterococcus faecium VR and ESBL E. Coli. S/p Daptomycin (6/21-6/23) and Meropenem (6/21-6/24).    #Hx Aspergillus PNA (11/2023)  #Hx PJP PNA (1/2021)  #Hx CMV viremia, recurrent  #Hx EBV viremia  - Transplant ID consult, appreciate recs  PTA posaconazole (Treatment for hx of aspergillus PNA)  PTA azithromycin 250mg qd (CLAD ppx)  PTA bactrim (PJP ppx)     Hematology:    #Acute on Chronic Normocytic Anemia, stable  #Thrombocytopenia, chronic  History of chronic anemia in the setting of kidney disease (baseline Hgb 10-11). Upon admission, Hgb 9. May be bone marrow suppression in the setting of chronic illness; potential contribution by blood loss with CRRT filter changes (~150-200c). Peripheral smear (6/18/2024) without evidence of schistocytes. Hemoglobin low today at 7.1, no signs of active bleeding. Additionally, platelets low at 52 today.   - Continue to monitor CBC in AM  - monitor if need to hold DVT prophylaxis in the AM  - Per nephrology, 4000 units epoetin daily    Musculoskeletal:  - PT / OT consult     Skin:  - Sacral Blanching- off load with frequent turns in bed    General Cares/Prophylaxis:    DVT Prophylaxis: subcutaneous heparin  GI Prophylaxis: PPI  Restraints: None  Family Communication:  at bedside  Code Status: Full Code     Lines/tubes/drains:  - PIV x2, midline  - RIJ  - Tracheostomy  -  PEG/J    Disposition:  - Medical ICU   - Care coordinator consulted regarding potential LTAC placement likely for next week.    Patient seen and findings/plan discussed with medical ICU staff, Dr. James.    Jailyn Lou MD  Internal Medicine Resident, PGY-2    Clinically Significant Risk Factors        # Hypokalemia: Lowest K = 3.3 mmol/L in last 2 days, will replace as needed  # Hyponatremia: Lowest Na = 127 mmol/L in last 2 days, will monitor as appropriate      # Hypoalbuminemia: Lowest albumin = 2.2 g/dL at 6/21/2024  4:26 PM, will monitor as appropriate   # Thrombocytopenia: Lowest platelets = 52 in last 2 days, will monitor for bleeding                 #Precipitous drop in Hgb/Hct: Lowest Hgb this hospitalization: 6.9 g/dL. Will continue to monitor and treat/transfuse as appropriate.      # Moderate Malnutrition: based on nutrition assessment    # Financial/Environmental Concerns: none                ====================================  INTERVAL HISTORY:   No acute events overnight. Noted to be hypotensive down to MAPs of 53, but self resolves by next check. She notes that she is feeling otherwise okay this AM. Pressure supported well yesterday afternoon at 10/5 and again this AM.     OBJECTIVE:   1. VITAL SIGNS:   Temp:  [97.8  F (36.6  C)-98.5  F (36.9  C)] 98  F (36.7  C)  Pulse:  [] 84  Resp:  [18-21] 18  BP: ()/(43-78) 103/56  FiO2 (%):  [30 %] 30 %  SpO2:  [96 %-100 %] 99 %  Vent Mode: (S) CMV/AC  (Continuous Mandatory Ventilation/ Assist Control)  FiO2 (%): 30 %  Resp Rate (Set): 18 breaths/min  Tidal Volume (Set, mL): 320 mL  PEEP (cm H2O): 5 cmH2O  Pressure Support (cm H2O): 10 cmH2O  Resp: 18    2. INTAKE/ OUTPUT:   I/O last 3 completed shifts:  In: 1573 [I.V.:238; NG/GT:645]  Out: 2000 [Other:2000]    3. PHYSICAL EXAMINATION:  General: Sitting up in bed, NAD   HEENT: Atraumatic, moist mucous membranes   Pulm/Resp: Ongoing coarse breath sounds, unchanged from prior exam.  Increased expiratory wheezing, stable. Good air movement throughout.   CV: RRR, no m/r/g   Abdomen: Soft, non-distended, non-tender, bowel sounds present.  Ext: Trace bilateral LE edema, pulses 2+ radial, pedal  Incisions/Skin: No rashes or lesions, trach in place with oozing on gauze, no signs of bleeding  Neuro: Awake and alert, follows 1-step and 2-step commands and nods and shakes head to questions, moving all extremities equally    4. LABS:   Arterial Blood Gases   Recent Labs   Lab 06/30/24  1537   PH 7.38   PCO2 41   PO2 154*   HCO3 24     Complete Blood Count   Recent Labs   Lab 07/07/24  0430 07/06/24  0551 07/05/24  0339 07/04/24  0335   WBC 3.0* 5.2 4.7 6.8   HGB 7.1* 8.1* 7.4* 8.4*   PLT 52* 75* 70* 94*     Basic Metabolic Panel  Recent Labs   Lab 07/07/24  0742 07/07/24  0430 07/07/24  0429 07/07/24  0046 07/06/24  1149 07/06/24  1143 07/06/24  0740 07/06/24  0551 07/05/24  0342 07/05/24  0339   NA  --  132*  --   --   --  131*  --  127*  --  131*   POTASSIUM  --  3.5  --   --   --  3.3*  --  3.7  --  3.5   CHLORIDE  --  99  --   --   --  97*  --  95*  --  100   CO2  --  24  --   --   --  25  --  21*  --  23   BUN  --  41.7*  --   --   --  26.6*  --  55.5*  --  34.9*   CR  --  1.91*  --   --   --  1.28*  --  2.34*  --  1.57*   * 115* 111* 107*   < > 151*   < > 131*   < > 88    < > = values in this interval not displayed.     Liver Function Tests  Recent Labs   Lab 07/02/24  0359 07/01/24  1637 07/01/24  0346 06/30/24  1527   AST 26  --  25  --    ALT 37  --  36  --    ALKPHOS 176*  --  173*  --    BILITOTAL 0.6  --  0.6  --    ALBUMIN 2.8* 2.7* 2.6* 2.7*     Coagulation Profile  No lab results found in last 7 days.      5. RADIOLOGY:   No results found for this or any previous visit (from the past 24 hour(s)).

## 2024-07-07 NOTE — PROGRESS NOTES
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 Nephrology consulted for management of HD while admitted.      Interval History :   - BP overnight and this AM running low, as low as 79/43 overnight, most recently 85/55  - Vent oxygen requirement stable and minimal. Afebrile. Per respiratory documentation, has had some anxiety with PS trial.  - Followed up with palliative care yesterday; goals remain restorative care without limits.  - Had HD yesterday with 2 L UF. BP stable throughout run. Had prolonged bleeding (15 min).   -  was at bedside during my visit this morning. He reported Kecia has made some progress on pressure support trials -- did 35 minutes later yesterday and an hour this morning. They are discussing LTACH placement.     Assessment & Recommendations:   ESRD-ESKD: pt dialyzes MWF at [facility] (ph [phone], f [phone]) with Dr. Glasgow. Run time: 3.5 hrs. EDW 52.5 kg. Access: L AVF. +heparin 1,500u bolus.                  -Appreciate team placing tri-alysis line     - HD today: 3h, goal UF 1-2L                -No need for new dialysis consent, continuing long term HD for ESRD.                -L AVF: had angioplasty for venous outflow stenosis in March. Noted prolonged bleeding after HD on 7/4, access US yesterday suggests recurrent stenosis. Will discuss with IR regarding fistulogram to address stenosis (would like to do this before she is transferred to LTACH).        Outpt Rx:       Volume- Below outpt EDW now at ~49kg, had to back off on UF on 7/4. New EDW appears 49-50 kg.       Pulm-Admitted with hypercapnic  "resp failure, suspected PNA. ID involved, getting bronch.  Currently on bactrim, micafungin, Posaconazole, Azithromycin and Vanco x1 dose so far.       Electrolytes- Na 132, K 3.5.     BMD-Ca 8.0, phos 9.3. On cholecalciferol 50 mcg daily.       Anemia-Hgb 7.1 --> down from 8.1 yesterday. Last PRBC's 6/26 on venofer 50mg weekly but will hold while treating for infection. On Mircera 60mcg [id number], will cover with short term Epo (4000 units) while admitted.       [facility]      Time spent: 35 minutes on this date of encounter for chart review, physical exam, medical decision making and co-ordination of care.      Recommendations were communicated to primary team via this note.     Lesly Goodman DO, MSCI   of Medicine, Division of Nephrology and Hypertension  [facility]  Pager [phone]      Review of Systems:   Deferred due to trach    Physical Exam:   I/O last 3 completed shifts:  In: 1573 [I.V.:238; NG/GT:645]  Out: 2000 [Other:2000]   /56   Pulse 84   Temp 98  F (36.7  C) (Axillary)   Resp 18   Ht 1.6 m (5' 3\")   Wt 52 kg (114 lb 10.2 oz)   LMP 06/07/2014 (Exact Date)   SpO2 99%   BMI 20.31 kg/m       GENERAL APPEARANCE: Chronically ill-appearing, vent via trach, resting in bed  EYES: No scleral icterus  Pulmonary:  tracheostomy, symmetric chest expansion  CV: Regular rhythm, normal rate   - 2+ edema in dependent areas   GI: soft, nontender, normal bowel sounds  MS: no evidence of inflammation in joints, no muscle tenderness  : No Basilio  SKIN: no rash, warm, dry  NEURO: No focal deficits  ACCESS: E AVF    Labs:   All labs reviewed by me  Electrolytes/Renal -   Recent Labs   Lab Test 07/07/24  0742 07/07/24  0430 07/07/24  0429 07/06/24  1149 07/06/24  1143 07/06/24  0740 07/06/24  0551 07/05/24  0342 07/05/24  0339   NA  --  132*  --   --  131*  --  127*  --  131*   POTASSIUM  --  3.5  --   --  3.3*  --  3.7  --  3.5   CHLORIDE  --  99  --   --  97*  --  95*  --  100 "   CO2  --  24  --   --  25  --  21*  --  23   BUN  --  41.7*  --   --  26.6*  --  55.5*  --  34.9*   CR  --  1.91*  --   --  1.28*  --  2.34*  --  1.57*   * 115* 111*   < > 151*   < > 131*   < > 88   CHELSEY  --  8.0*  --   --  8.5*  --  8.0*  --  7.8*   MAG  --  1.8  --   --  1.7  --  2.1  --  2.0   PHOS  --  3.9  --   --   --   --  5.1*  --  3.4    < > = values in this interval not displayed.       CBC -   Recent Labs   Lab Test 07/07/24  0430 07/06/24  0551 07/05/24  0339   WBC 3.0* 5.2 4.7   HGB 7.1* 8.1* 7.4*   PLT 52* 75* 70*       LFTs -   Recent Labs   Lab Test 07/02/24  0359 07/01/24  1637 07/01/24  0346 06/30/24  1527 06/30/24  0332   ALKPHOS 176*  --  173*  --  178*   BILITOTAL 0.6  --  0.6  --  0.8   ALT 37  --  36  --  38   AST 26  --  25  --  26   PROTTOTAL 5.4*  --  5.0*  --  4.9*   ALBUMIN 2.8* 2.7* 2.6*   < > 2.6*    < > = values in this interval not displayed.       Iron Panel -   Recent Labs   Lab Test 02/03/21  0415 12/13/18  1033 08/01/18  0921   IRON 51 16* 93   IRONSAT 36 7* 37   YOLA  --  302* 571*           Current Medications:  Current Facility-Administered Medications   Medication Dose Route Frequency Provider Last Rate Last Admin    acetaminophen (TYLENOL) tablet 325 mg  325 mg Oral or Feeding Tube Q4H Jailyn Lou MD   325 mg at 07/07/24 0946    acetylcysteine (MUCOMYST) 10 % nebulizer solution 4 mL  4 mL Inhalation BID Velez Reyes, German, MD   4 mL at 07/07/24 0810    azithromycin (ZITHROMAX) tablet 250 mg  250 mg Oral or Feeding Tube Daily Velez Reyes, German, MD   250 mg at 07/07/24 0731    calcium carbonate (TUMS) chewable tablet 500 mg  500 mg Oral Once per day on Sunday Tuesday Thursday Saturday Josesito Pratt MD   500 mg at 07/07/24 0730    chlorhexidine (PERIDEX) 0.12 % solution 15 mL  15 mL Mouth/Throat Q12H Chio Cortez MD   15 mL at 07/07/24 0730    epoetin alisha-epbx (RETACRIT) injection 4,000 Units  4,000 Units Intravenous Once per day on  Monday Wednesday Friday Velez Reyes, German, MD   4,000 Units at 07/05/24 1150    fiber modular (NUTRISOURCE FIBER) packet 1 packet  1 packet Per Feeding Tube Q8H Awa Watt MD   1 packet at 07/07/24 0731    heparin ANTICOAGULANT injection 5,000 Units  5,000 Units Subcutaneous Q8H Rossana Sarabia APRN CNP   5,000 Units at 07/07/24 0629    insulin aspart (NovoLOG) injection (RAPID ACTING)  1-12 Units Subcutaneous Q4H Edin Davis MD   1 Units at 07/06/24 1151    insulin NPH injection 8 Units  8 Units Subcutaneous BID Velez Reyes, German, MD   8 Units at 07/07/24 0744    levalbuterol (XOPENEX) neb solution 0.63 mg  0.63 mg Nebulization 4x Daily Gareth Mosquera MD   0.63 mg at 07/07/24 0810    [Held by provider] magnesium chloride CR tablet 1,070 mg  1,070 mg Oral Once per day on Sunday Tuesday Thursday Saturday Josesito Pratt MD        metoprolol tartrate (LOPRESSOR) tablet 25 mg  25 mg Oral BID Velez Reyes, German, MD   25 mg at 07/07/24 0730    micafungin (MYCAMINE) 150 mg in sodium chloride 0.9 % 100 mL intermittent infusion  150 mg Intravenous Q24H Chio Cortez  mL/hr at 07/07/24 0049 150 mg at 07/07/24 0049    midodrine (PROAMATINE) tablet 20 mg  20 mg Oral or Feeding Tube Q8H [facility] Rossana Sarabia APRN CNP   20 mg at 07/07/24 0606    montelukast (SINGULAIR) tablet 10 mg  10 mg Oral At Bedtime Velez Reyes, German, MD   10 mg at 07/06/24 2112    multivitamin RENAL (RENAVITE RX/NEPHROVITE) tablet 1 tablet  1 tablet Oral or Feeding Tube Daily Awa Watt MD   1 tablet at 07/07/24 0730    pantoprazole (PROTONIX) 2 mg/mL suspension 40 mg  40 mg Per Feeding Tube Daily Randi James MD   40 mg at 07/06/24 2112    posaconazole (NOXAFIL) DR tablet TBEC 300 mg  300 mg Per Feeding Tube Daily Josesito Pratt MD   300 mg at 07/06/24 1317    predniSONE (DELTASONE) tablet 5 mg  5 mg Oral Daily Rossana Sarabia APRN CNP   5 mg at 07/07/24 0730    protein modular (PROSOURCE TF20) packet 1  packet  1 packet Per Feeding Tube Daily Edin Davis MD   1 packet at 07/07/24 0731    QUEtiapine (SEROquel) tablet 50 mg  50 mg Oral or Feeding Tube At Bedtime Kajal Chong APRN CNP   50 mg at 07/06/24 2112    ramelteon (ROZEREM) tablet 8 mg  8 mg Oral At Bedtime Velez Reyes, German, MD   8 mg at 07/06/24 2112    sodium chloride (NEBUSAL) 3 % neb solution 3 mL  3 mL Nebulization BID Velez Reyes, German, MD   3 mL at 07/06/24 2005    sodium chloride (PF) 0.9% PF flush 10 mL  10 mL Intracatheter Q8H Kajal Chong APRN CNP   10 mL at 07/07/24 0732    sodium chloride (PF) 0.9% PF flush 10 mL  10 mL Intracatheter Q8H Kajal Chong APRN CNP   10 mL at 07/07/24 0731    sulfamethoxazole-trimethoprim (BACTRIM) 400-80 MG per tablet 1 tablet  1 tablet Oral or Feeding Tube Once per day on Tuesday Thursday Saturday Randi James MD   1 tablet at 07/06/24 2126    tacrolimus (GENERIC) suspension 0.8 mg  0.8 mg Oral or Feeding Tube BID IS Yonny Orona MD   0.8 mg at 07/07/24 0731    traZODone (DESYREL) half-tab 25 mg  25 mg Per Feeding Tube At Bedtime Jailyn Lou MD   25 mg at 07/06/24 2112    valGANciclovir (VALCYTE) solution 450 mg  450 mg Per Feeding Tube Once per day on Tuesday Saturday Randi James MD   450 mg at 07/06/24 2112    Vitamin D3 (CHOLECALCIFEROL) tablet 50 mcg  50 mcg Oral Once per day on Monday Wednesday Friday Velez Reyes, German, MD   50 mcg at 07/05/24 1614     Current Facility-Administered Medications   Medication Dose Route Frequency Provider Last Rate Last Admin    dextrose 10% infusion   Intravenous Continuous PRN Velez Reyes, German, MD   Stopped at 07/03/24 1600    dextrose 10% infusion   Intravenous Continuous PRN Awa Watt MD   Stopped at 07/05/24 1400

## 2024-07-07 NOTE — PLAN OF CARE
ICU End of Shift Summary. See flowsheets for vital signs and detailed assessment.    Changes this shift: Blood pressure low at times, team notified and resolved without intervention. Patient reports resting well between cares. FiO2 maintained at 30%. In and out of Afib, self converts to sinus rhythm/sinus arrhythmia. No changes in BP when in afib vs SR. BM x1.     Plan:  Pressure support trials BID. Awaiting discharge planning to LTACH. Work with PT and OT.     Goal Outcome Evaluation:      Plan of Care Reviewed With: patient, spouse    Overall Patient Progress: improvingOverall Patient Progress: improving    Outcome Evaluation: see note

## 2024-07-07 NOTE — PROGRESS NOTES
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  The patient was admitted on 24 for progressive hypoxia with dyspnea and worsening hypercapnia in ED requiring intubation likely d/t post-obstructive PNA 2/2 right bronchus stenosis.  Bronch confirming severe stenosis of right anastomosis with extensive RLL plugging.  IP bronch () with laser tissue debulking RMB stenosis, airway balloon dilation of RMB and BI, and placement of stent RMB to BI and bifurcating stent in RUL.  S/p volume resuscitation and transition to CRRT  for hypotension, stopped .  Increased agitation/delirium on  with increasing desaturation led to need for increased sedation.  Recurrence of paroxysmal afib with RVR first noted .  Remains intubated with pulmonary edema on imaging and septic shock requiring pressor support, limited/variable tolerance of PST.  She underwent Trach on 7/3/24. She is doing PST but requiring higher pressures for PST. GJ was placed on 2024.    Today's recommendations:  - Continue antimicrobial regimen per transplant ID  - Repeat CXR on Monday (needs to be ordered).  - Prospera (7/3) pending  - DSA (7/3) pending  - Tacrolimus level to be ordered .  - immuknow on 24.  - Bactrim MWF for PJP ppx (monitor  need to adjust pending HD plan)  - Awaiting susceptibilities on Aspergillus per transplant ID  - Agree with Tx ID to stop Linezolid/minocycline.  - CMV weekly monitoring (qTuesday), continue VGCV ppx (renally dosed, monitor need to adjust pending HD plan) with plan for 3 weeks beyond completion of stress dose steroids (ie 7/26/24)     Septic shock:  Acute on chronic hypoxic/hypercapneic respiratory failure:  Post-obstructive multi-lobar PNA:  Right bronchial stenosis with RML collapse: Admitted with 2 weeks of progressive hypoxia, dyspnea, congested cough, and fatigue.  Chronic hypoxia with 2L NC, increased from 3 to 4L over this time with acute decompensation on day of admission associated with hypercapnia.  VBG 7/17/85 in ED s/p intubation.  IP bronch 2/15 s/p tissue debulking without stent placement.  Negative ID workup: respiratory panel, COVID, MRSA nares, PJP, Legionella, Strep pneumoniae, cocci, Histo, CrAg, fungitell x2, and A. galactomannan (blood).  IgG WNL.  Procal mildly elevated at 0.24 initially (then elevated to 1.77 6/20), LA normal, but febrile to 100.7.  TTE on admission grossly normal.  CT with RUL/RLL consolidative opacities, RML collapse, and narrowing of BI and distal RLL bronchus.  Started on norepi 6/19.  Bronch per MICU (6/19) with severe stenosis starting at right anastomosis, inspection with smaller scope revealing for extensive RLL mucous plugging.  Bronch per IP (6/20) with laser tissue debulking RMB stenosis, airway balloon dilation of RMB and BI, and placement of stent RMB to BI and bifurcating stent in RUL.  Intermittent/low grade fevers persisting.  Respiratory cultures with Steno, VSE, VRE, Aspergillus ochraceus, and Elidia guilliermondii.  Chest CT (6/29) with multifocal panlobar opacities and small bibasilar pleural effusions.  Remains on full vent support, limited/variable tolerance of PST, Trach'ed on 7/3/24 by ENT. She continues to struggle tolerating PST. She continues to  struggle doing PST, but able to do 12/5 for 3.5hrs on 7/4/24. Unable to do any PST on 7/5/24. On 7/6 she did 35mins and today (7/7/2024) has done 1hr.  - The Etiology of GGO noted on Chest CT, BAL 7/3/24 with >80% mono/macros but cultures positive for Steno. She has grown Steno all her life. She has been aggressively dialyzed but there has been no improvement. Immuknow checked on 7/8/24. If it is higher might support a trial of steroids.   - BAL fluid cell count diff shows it is mostly mono/macro making infection/rejection and  less likely.    - If repeat CXR is remains unchanged on Monday consider Checking Chest CT non-contrast.   - Check VBG and then decreasing set Vent rate from 18/min to 10min if PCO2 is wnl.    - ABX: linezolid (6/25-7/5), and minocycline (6/20-7/5, Steno) per transplant ID; s/p ceftazidime (6/25-6/28), meropenem (6/21-6/24), daptomycin (6/21-6/24), levofloxacin (6/21-6/23), Zosyn (6/18-6/21), azithromycin 500 mg daily (6/18-6/20), and vancomycin (6/18)   - Nebs: levalbuterol QID, Mucomyst 10% BID alternating with 3% HTS (6/22), will need to be on NS nebs BID upon discharge per IP  - Vent management and weaning per MICU  - Stress dose steroids per MICU (started 6/21 - 7/6)  - Palliative care consultation appreciated.  - Health Psych consulted.     S/p bilateral sequential lung transplant (BSLT) for ILD:   Progressive CLAD: Most recent OP visit in February.  DSA negative 6/19 (last positive noted 2018).  Repeat ImmuKnow (6/19) 87, very low but IST adjustment deferred per Dr. Graham given acuity and steroids (after ImmuKnow level).  Repeat Prospera remains high at 2.3 on 6/19, may be artificially elevated with current infection, but also notably high at 2.42 on 2/14.  - Repeat Prospera (7/3) pending  - Repeat DSA (7/3) pending  - PTA Singulair, azithromycin, and Breo inhaler (resume once extubated)     Immunosuppression:  On 2-drug IST since November d/t leukopenia and recurrent infections  -  Tacrolimus 0.8 mg BID.  Goal level 8-10.  Repeat level on  7/9  - Prednisone 5 mg daily (Off stress dose steroids since 7/5/24).     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  - ABX and infectious work up as above     Aspergillus ochraceus:  H/o Aspergillus empyema:  S/p empyema drainage and ampho B instillation.  Previously on voriconazole, transitioned to posaconazole and managed by transplant ID as OP with last visit 3/13.  Calcified fungal elements remain with additional recurrent effusion (likely associated with fluid disturbances r/t iHD), but surgical intervention previously deferred d/t risk.  Posaconazole level 2.5 on 6/19.  - Awaiting susceptibilities on Aspergillus (bronch culture 6/19) per transplant ID  - Posaconazole 300 mg daily chronically, micafungin 150 mg daily (6/25) per transplant ID     H/o CMV viremia: Recurrent CMV viremia historically.  VGCV ppx most recently stopped 4/12.  Recent CMV low positive at 46 (6/12), <35 (6/18), and 39 (6/25, 7/2).  - CMV weekly monitoring (qTuesday) with steroid increase  - VGCV ppx (renally dosed, monitor need to adjust pending HD plan) given stress dose steroids (6/24) with tentative plan for 3 weeks beyond completion of stress dose steroids (ie 7/26/24)     EBV viremia: S/p rituximab 12/13/23 x1 dose.  Most recent EBV negative 6/18.     Dilated CBD:  Suspected acute cholecystitis: MRCP (6/29) with CBD dilation with stones and cholelithiasis without cholecystitis.  GI signed off 6/30, recommending general surgery consult if RUQ pain persists.     ESRD on iHD: Kidney evaluation started as OP.  On qMWF iHD schedule, no missed sessions.  Transitioned to CRRT on 6/20, stopped 7/2.  Management per renal and MICU with fluid removal as able.     Persistent hypotension: She is now on Midodrine 20mg TID additional 15mg prn for iHD. Despite this continues  "to have low BP, midodrine was started on 6/29/24.   - TSH was wnl on 6/26/24.  - Wonder if she is adrenally insufficient but this was present on stress dose steroids.'  - Consider weaning down metoprolol.     VRE urine culture: Noted 6/19, >100k GNB and VRE faecium, ABX as above with management per transplant ID and MICU    We appreciate the excellent care provided by the MICU team.  Recommendations communicated via in person rounding and this note.  Will continue to follow along closely, please do not hesitate to call with any questions or concerns.     Subjective & Interval History:     PS 12/5 40-50% x 35mins yesterday. Remains full vent support. Weight is above her dry weight.  Loose stools, no nausea.      Review of Systems:     ROS as above otherwise significantly limited due to intubation and sedation.     Physical Exam:     All notes, images, and labs from past 24 hours (at minimum) were reviewed.    Vital signs:  Temp: 97.9  F (36.6  C) Temp src: Axillary BP: 96/59 Pulse: 84   Resp: 18 SpO2: 100 % O2 Device: Mechanical Ventilator Oxygen Delivery: 6 LPM Height: 160 cm (5' 3\") Weight: 52 kg (114 lb 10.2 oz)  I/O:     Intake/Output Summary (Last 24 hours) at 7/6/2024 1651  Last data filed at 7/6/2024 1600  Gross per 24 hour   Intake 1540 ml   Output 2000 ml   Net -460 ml     Constitutional: Sitting up in chair, in no apparent distress.   HEENT: Eyes with pink conjunctivae, anicteric.  Orally intubated via ETT.  Nasal FT.  PULM: Good air flow bilaterally.  Coarse crackles t/o.  No rhonchi, no wheezes.  Non-labored breathing on full vent support.  CV: Normal S1 and S2.  RRR.  No murmur, gallop, or rub.  No peripheral edema.   ABD: NABS, soft, nondistended.    MSK: Moves all extremities.  + muscle wasting.   NEURO: Opens eyes to voice, able to answer some yes/no questions by nodding/shaking head.  SKIN: Warm, dry, fragile.  Bilateral toes reddened.  PSYCH: Calm.     Data:     LABS    CMP:   Recent Labs   Lab " 07/07/24  1213 07/07/24  0742 07/07/24  0430 07/07/24  0429 07/06/24  1149 07/06/24  1143 07/06/24  0740 07/06/24  0551 07/05/24  0342 07/05/24  0339 07/04/24  0340 07/04/24  0335 07/02/24  0409 07/02/24  0359 07/01/24  1640 07/01/24  1637 07/01/24  0353 07/01/24 0346 06/30/24  1537 06/30/24  1527   NA  --   --  132*  --   --  131*  --  127*  --  131*   < > 132*   < > 138  --  137  --  138  --  137   POTASSIUM  --   --  3.5  --   --  3.3*  --  3.7  --  3.5   < > 3.6   < > 3.6  --  3.8  --  3.6  --  3.8   CHLORIDE  --   --  99  --   --  97*  --  95*  --  100   < > 103   < > 108*  --  105  --  107  --  106   CO2  --   --  24  --   --  25  --  21*  --  23   < > 18*   < > 22  --  23  --  22  --  21*   ANIONGAP  --   --  9  --   --  9  --  11  --  8   < > 11   < > 8  --  9  --  9  --  10   * 126* 115* 111*   < > 151*   < > 131*   < > 88   < > 143*   < > 161*   < > 155*   < > 170*   < > 173*   BUN  --   --  41.7*  --   --  26.6*  --  55.5*  --  34.9*   < > 67.4*   < > 28.2*  --  29.2*  --  28.1*  --  28.1*   CR  --   --  1.91*  --   --  1.28*  --  2.34*  --  1.57*   < > 2.30*   < > 0.91  --  0.95  --  0.89  --  0.88   GFRESTIMATED  --   --  29*  --   --  47*  --  23*  --  37*   < > 23*   < > 71  --  68  --  73  --  74   CHELSEY  --   --  8.0*  --   --  8.5*  --  8.0*  --  7.8*   < > 7.7*   < > 8.1*  --  7.6*  --  7.8*  --  7.9*   MAG  --   --  1.8  --   --  1.7  --  2.1  --  2.0   < > 2.6*   < > 2.5*  --  2.4*  --  2.5*  --  2.5*   PHOS  --   --  3.9  --   --   --   --  5.1*  --  3.4  --  7.0*   < > 4.8*  --  4.7*  --  4.7*  --  4.6*   PROTTOTAL  --   --   --   --   --   --   --   --   --   --   --   --   --  5.4*  --   --   --  5.0*  --   --    ALBUMIN  --   --   --   --   --   --   --   --   --   --   --   --   --  2.8*  --  2.7*  --  2.6*  --  2.7*   BILITOTAL  --   --   --   --   --   --   --   --   --   --   --   --   --  0.6  --   --   --  0.6  --   --    ALKPHOS  --   --   --   --   --   --   --   --   --   --   " --   --   --  176*  --   --   --  173*  --   --    AST  --   --   --   --   --   --   --   --   --   --   --   --   --  26  --   --   --  25  --   --    ALT  --   --   --   --   --   --   --   --   --   --   --   --   --  37  --   --   --  36  --   --     < > = values in this interval not displayed.     CBC:   Recent Labs   Lab 07/07/24  0430 07/06/24  0551 07/05/24  0339 07/04/24  0335   WBC 3.0* 5.2 4.7 6.8   RBC 2.26* 2.59* 2.34* 2.65*   HGB 7.1* 8.1* 7.4* 8.4*   HCT 22.0* 25.5* 23.0* 26.4*   MCV 97 99 98 100   MCH 31.4 31.3 31.6 31.7   MCHC 32.3 31.8 32.2 31.8   RDW 19.3* 19.4* 18.8* 18.7*   PLT 52* 75* 70* 94*       INR: No lab results found in last 7 days.    Glucose:   Recent Labs   Lab 07/07/24  1213 07/07/24  0742 07/07/24  0430 07/07/24  0429 07/07/24  0046 07/06/24  2111   * 126* 115* 111* 107* 137*       Blood Gas:   Recent Labs   Lab 07/07/24  0748 06/30/24  1537   PHV 7.35  --    PCO2V 45  --    PO2V 59*  --    HCO3V 25  --    LASHAUN -0.5  --    O2PER 30 45       Culture Data No results for input(s): \"CULT\" in the last 168 hours.    Virology Data:   Lab Results   Component Value Date    FLUAH1 Not Detected 06/18/2024    FLUAH3 Not Detected 06/18/2024    IL2343 Not Detected 06/18/2024    IFLUB Not Detected 06/18/2024    RSVA Not Detected 06/18/2024    RSVB Not Detected 06/18/2024    PIV1 Not Detected 06/18/2024    PIV2 Not Detected 06/18/2024    PIV3 Not Detected 06/18/2024    HMPV Not Detected 06/18/2024    HRVS Negative 01/24/2021    ADVBE Negative 01/24/2021    ADVC Negative 01/24/2021    ADVC Negative 12/23/2020    ADVC Negative 10/07/2019       Historical CMV results (last 3 of prior testing):  Lab Results   Component Value Date    CMVQNT <35 (A) 06/18/2024    CMVQNT Not Detected 03/05/2024    CMVQNT Not Detected 12/19/2023     Lab Results   Component Value Date    CMVLOG 1.6 07/02/2024    CMVLOG 1.6 06/25/2024    CMVLOG <1.5 06/18/2024       Urine Studies    Recent Labs   Lab Test " 06/19/24  1214 01/24/21  1729   URINEPH 6.5 5.0   NITRITE Negative Negative   LEUKEST Large* Moderate*   WBCU >182* 34*       Most Recent [place] Pulmonary Function Testing (FVC/FEV1 only)  FVC-Pre   Date Value Ref Range Status   02/14/2024 0.85 L    12/19/2023 1.04 L    11/21/2023 0.85 L    10/24/2023 0.96 L      FVC-%Pred-Pre   Date Value Ref Range Status   02/14/2024 29 %    12/19/2023 36 %    11/21/2023 29 %    10/24/2023 33 %      FEV1-Pre   Date Value Ref Range Status   02/14/2024 0.80 L    12/19/2023 0.89 L    11/21/2023 0.78 L    10/24/2023 0.87 L      FEV1-%Pred-Pre   Date Value Ref Range Status   02/14/2024 34 %    12/19/2023 38 %    11/21/2023 33 %    10/24/2023 37 %        IMAGING    Recent Results (from the past 48 hour(s))   XR Chest Port 1 View    Narrative    EXAM: XR CHEST PORT 1 VIEW  7/2/2024 3:44 PM      HISTORY: Lung tx, intubated. CT on 6/29 showed GGO    COMPARISON: CT 6/29/2024, 6/26/2024 radiograph    FINDINGS: Single view of the chest. An endotracheal tube terminates in  the midthoracic trachea. Postoperative changes in the chest and intact  median sternotomy wires. Enteric tube terminates in the stomach and  duodenum. Right IJ central venous catheter tip terminates at the  superior cavoatrial junction. Right bronchial stent is unchanged.   Trachea is midline. Cardiac silhouette is stable. Diffuse hazy opacity  throughout the left greater than right lungs, slightly improved  compared to 6/26/2024. No pleural effusion. No pneumothorax.      Impression    IMPRESSION:   1. Endotracheal tube terminates in the midthoracic trachea. Additional  support devices are stable.  2. Hazy opacities throughout the left greater the right lungs, overall  improved compared to radiograph dated 6/26/2024.    I have personally reviewed the examination and initial interpretation  and I agree with the findings.    TERRI ISSA MD         SYSTEM ID:  [id number]

## 2024-07-08 NOTE — PROGRESS NOTES
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  The patient was admitted on 24 for progressive hypoxia with dyspnea and worsening hypercapnia in ED requiring intubation likely d/t post-obstructive PNA 2/2 right bronchus stenosis.  Bronch confirming severe stenosis of right anastomosis with extensive RLL plugging.  IP bronch () with laser tissue debulking RMB stenosis, airway balloon dilation of RMB and BI, and placement of stent RMB to BI and bifurcating stent in RUL.  S/p volume resuscitation and transition to CRRT  for hypotension, stopped  and iHD resumed .  Increased agitation/delirium on  with increasing desaturation led to need for increased sedation.  Recurrence of paroxysmal afib with RVR first noted .  Remains intubated with pulmonary edema on imaging and septic shock requiring pressor support, limited/variable tolerance of PST.  S/p trach with ENT on 7/3 and GJ tube with IR on .      Today's recommendations:  - ImmuKnow () pending, if higher might support trial of steroids   - CXR today as below, recommend chest CT non-contrast  - Health psych consult   - Prospera (7/3) pending  - DSA (7/3) pending  - Tacrolimus level ordered   - Bactrim 3x weekly for PJP ppx (monitor need to  adjust timing pending HD schedule)  - Awaiting susceptibilities on Aspergillus per transplant ID  - Continue posaconazole and micafungin per transplant ID  - CMV weekly monitoring (qTuesday), continue VGCV ppx (renally dosed, monitor need to adjust timing pending HD schedule) with plan for 3 weeks beyond completion of stress dose steroids (through 7/26)     Septic shock:  Acute on chronic hypoxic/hypercapneic respiratory failure:  Post-obstructive multi-lobar PNA:  Right bronchial stenosis with RML collapse: Admitted with 2 weeks of progressive hypoxia, dyspnea, congested cough, and fatigue.  Chronic hypoxia with 2L NC, increased from 3 to 4L over this time with acute decompensation on day of admission associated with hypercapnia.  VBG 7/17/85 in ED s/p intubation.  IP bronch 2/15 s/p tissue debulking without stent placement.  Negative ID workup: respiratory panel, COVID, MRSA nares, PJP, Legionella, Strep pneumoniae, cocci, Histo, CrAg, fungitell x2, and A. galactomannan (blood).  IgG WNL.  Procal mildly elevated at 0.24 initially (then elevated to 1.77 6/20), LA normal, but febrile to 100.7.  TTE on admission grossly normal.  CT with RUL/RLL consolidative opacities, RML collapse, and narrowing of BI and distal RLL bronchus.  Started on norepi 6/19.  Bronch per MICU (6/19) with severe stenosis starting at right anastomosis, inspection with smaller scope revealing for extensive RLL mucous plugging.  Bronch per IP (6/20) with laser tissue debulking RMB stenosis, airway balloon dilation of RMB and BI, and placement of stent RMB to BI and bifurcating stent in RUL.  Intermittent/low grade fevers persisting.  Respiratory cultures with Steno, VSE, VRE, Aspergillus ochraceus, and Elidia guilliermondii.  Chest CT (6/29) with multifocal panlobar opacities and small bibasilar pleural effusions.  Remains on full vent support, limited/variable tolerance of PST.  S/p bronch with BAL to lingula.  S/p trach with ENT on 7/3 and GJ  tube with IR on 7/5.  S/p stress dose steroids 6/21-7/5.  - ImmuKnow (7/8) pending, if higher might support trial of steroids   - BAL cultures (7/3 with Steno and Candida guilliermondii complex  - CXR today with unchanged L>R streaky opacities (personally reviewed), recommend chest CT non-contrast  - ABX: none; s/p linezolid (6/25-7/5), minocycline (6/20-7/5, Steno), ceftazidime (6/25-6/28), meropenem (6/21-6/24), daptomycin (6/21-6/24), levofloxacin (6/21-6/23), Zosyn (6/18-6/21), azithromycin 500 mg daily (6/18-6/20), and vancomycin (6/18)   - Nebs: levalbuterol QID, Mucomyst 10% BID alternating with 3% HTS (6/22), will need to be on NS nebs BID upon discharge per IP  - Vent management and weaning per MICU  - Palliative care following   - Health psych consult   - LTACH timing TBD pending clinical course     S/p bilateral sequential lung transplant (BSLT) for ILD:   Progressive CLAD: Most recent OP visit in February.  DSA negative 6/19 (last positive noted 2018).  Repeat ImmuKnow (6/19) 87, very low but IST adjustment deferred per Dr. Graham given acuity and steroids (after ImmuKnow level).  Repeat Prospera remains high at 2.3 on 6/19, may be artificially elevated with current infection, but also notably high at 2.42 on 2/14.  - Repeat Prospera (7/3) pending  - Repeat DSA (7/3) pending  - PTA Singulair, azithromycin, and Breo inhaler (resume once appropriate)     Immunosuppression:  On 2-drug IST since November d/t leukopenia and recurrent infections  - Tacrolimus 0.8 mg BID.  Goal level 8-10.  Repeat level ordered 7/9.  - Prednisone 5 mg daily (resumed 7/6 following stress dose steroids)     Prophylaxis:   - Bactrim 3x weekly for PJP ppx (monitor need to adjust timing pending HD schedule)     ID: H/o Actinomyces (10/17/23) and recurrent Steno (8/17/23 and 11/1/23).  S/p ABX as above.     Aspergillus ochraceus:  H/o Aspergillus empyema: S/p empyema drainage and ampho B instillation.  Previously on voriconazole,  transitioned to posaconazole and managed by transplant ID as OP with last visit 3/13.  Calcified fungal elements remain with additional recurrent effusion (likely associated with fluid disturbances r/t iHD), but surgical intervention previously deferred d/t risk.  Posaconazole level 2.5 on 6/19.  - Awaiting susceptibilities on Aspergillus (bronch culture 6/19) per transplant ID  - Posaconazole 300 mg daily chronically, micafungin 150 mg daily (6/25) per transplant ID     H/o CMV viremia: Recurrent CMV viremia historically.  VGCV ppx most recently stopped 4/12.  Recent CMV low positive at 46 (6/12), <35 (6/18), and 39 (6/25, 7/2).  - CMV weekly monitoring (qTuesday) with steroid increase  - VGCV ppx (renally dosed, monitor need to adjust timing pending HD schedule) given stress dose steroids (6/24) with tentative plan for 3 weeks beyond completion of stress dose steroids (through 7/26)     EBV viremia: S/p rituximab 12/13/23 x1 dose.  Most recent EBV negative 6/18.     Dilated CBD:  Suspected acute cholecystitis: MRCP (6/29) with CBD dilation with stones and cholelithiasis without cholecystitis.  GI signed off 6/30, recommending general surgery consult if RUQ pain persists.     ESRD on iHD: Kidney evaluation started as OP.  On qMWF iHD schedule, no missed sessions.  Transitioned to CRRT on 6/20, stopped 7/2 and iHD resumed 7/4.  On scheduled midodrine given hypotension.  Management per renal and MICU with fluid removal as able.     Pancytopenia: WBC 2.6-3.2 since 7/7, ANC 1.7 on 7/8.  Platelets down to 50 on 7/8.  On VGCV as above.  Also intermittently requiring pRBC.  Management per MICU.  - CBC with differential daily, consider G-CSF if ANC </=0.8     VRE urine culture: Noted 6/19, >100k GNB and VRE faecium, ABX as above with management per transplant ID and MICU.    We appreciate the excellent care provided by the MICU team.  Recommendations communicated via in person rounding, telephone, and this note.  Will  "continue to follow along closely, please do not hesitate to call with any questions or concerns.    Patient discussed with Dr. Macias.    Cindy Mcadams PA-C  [facility]  Pulmonary [facility]/Transplant     Subjective & Interval History:     PS 10/5 40% 1h yesterday morning and 1.5h yesterday afternoon, stopped due to tachypnea.  Brief afib, self converted.  BP intermittently low.    Review of Systems:     ROS limited due to communication barriers with trach/vent    C: No fever, + decreased weight  INTEGUMENTARY/SKIN: No rash or obvious new lesions  ENT/MOUTH: No nasal drainage  RESP: See interval history  CV: No chest pain, no peripheral edema  GI: No nausea, no vomiting, no change in stools  : + anuric  MUSCULOSKELETAL: No myalgias, no arthralgias  ENDOCRINE: Blood sugars with adequate control  NEURO: No headache  PSYCHIATRIC: Mood stable    Physical Exam:     All notes, images, and labs from past 24 hours (at minimum) were reviewed.    Vital signs:  Temp: 97.9  F (36.6  C) Temp src: Axillary BP: 104/55 Pulse: 77   Resp: 18 SpO2: 100 % O2 Device: Mechanical Ventilator Oxygen Delivery: 6 LPM Height: 160 cm (5' 3\") Weight: 51.6 kg (113 lb 12.1 oz)  I/O:   Intake/Output Summary (Last 24 hours) at 7/8/2024 1021  Last data filed at 7/8/2024 1000  Gross per 24 hour   Intake 1550 ml   Output --   Net 1550 ml     Constitutional: Sitting up in chair, in no apparent distress.   HEENT: Eyes with pink conjunctivae, anicteric.  Oral mucosa moist without obvious lesions.  Trach in place.   PULM: Mildly diminished air flow to bases bilaterally.  Scattered crackles.  No rhonchi, no wheezes.  Non-labored breathing on PS.  CV: Normal S1 and S2.  RRR.  No murmur, gallop, or rub.  No peripheral edema.   ABD: NABS, soft, nondistended.    MSK: Moves all extremities.  + muscle wasting.   NEURO: Opens eyes to voice, able to answer some yes/no questions by nodding/shaking head.  SKIN: Warm, dry, fragile.    PSYCH: Mood stable, calm. "     Data:     LABS    CMP:   Recent Labs   Lab 07/08/24  0809 07/08/24  0443 07/08/24  0442 07/08/24  0108 07/07/24  0742 07/07/24  0430 07/06/24  1149 07/06/24  1143 07/06/24  0740 07/06/24  0551 07/05/24  0342 07/05/24  0339 07/02/24  0409 07/02/24  0359 07/01/24  1640 07/01/24  1637   NA  --  130*  --   --   --  132*  --  131*  --  127*  --  131*   < > 138  --  137   POTASSIUM  --  3.5  --   --   --  3.5  --  3.3*  --  3.7  --  3.5   < > 3.6  --  3.8   CHLORIDE  --  97*  --   --   --  99  --  97*  --  95*  --  100   < > 108*  --  105   CO2  --  23  --   --   --  24  --  25  --  21*  --  23   < > 22  --  23   ANIONGAP  --  10  --   --   --  9  --  9  --  11  --  8   < > 8  --  9   * 136* 135* 107*   < > 115*   < > 151*   < > 131*   < > 88   < > 161*   < > 155*   BUN  --  63.0*  --   --   --  41.7*  --  26.6*  --  55.5*  --  34.9*   < > 28.2*  --  29.2*   CR  --  2.62*  --   --   --  1.91*  --  1.28*  --  2.34*  --  1.57*   < > 0.91  --  0.95   GFRESTIMATED  --  20*  --   --   --  29*  --  47*  --  23*  --  37*   < > 71  --  68   CHELSEY  --  8.1*  --   --   --  8.0*  --  8.5*  --  8.0*  --  7.8*   < > 8.1*  --  7.6*   MAG  --  1.9  --   --   --  1.8  --  1.7  --  2.1  --  2.0   < > 2.5*  --  2.4*   PHOS  --  5.0*  --   --   --  3.9  --   --   --  5.1*  --  3.4   < > 4.8*  --  4.7*   PROTTOTAL  --   --   --   --   --   --   --   --   --   --   --   --   --  5.4*  --   --    ALBUMIN  --   --   --   --   --   --   --   --   --   --   --   --   --  2.8*  --  2.7*   BILITOTAL  --   --   --   --   --   --   --   --   --   --   --   --   --  0.6  --   --    ALKPHOS  --   --   --   --   --   --   --   --   --   --   --   --   --  176*  --   --    AST  --   --   --   --   --   --   --   --   --   --   --   --   --  26  --   --    ALT  --   --   --   --   --   --   --   --   --   --   --   --   --  37  --   --     < > = values in this interval not displayed.     CBC:   Recent Labs   Lab 07/08/24  0815 07/08/24  7798  "07/07/24  0430 07/06/24  0551   WBC 3.2* 2.6* 3.0* 5.2   RBC 2.17* 2.18* 2.26* 2.59*   HGB 6.8* 6.8* 7.1* 8.1*   HCT 21.9* 21.8* 22.0* 25.5*   * 100 97 99   MCH 31.3 31.2 31.4 31.3   MCHC 31.1* 31.2* 32.3 31.8   RDW 19.9* 19.6* 19.3* 19.4*   PLT 56* 50* 52* 75*       INR: No lab results found in last 7 days.    Glucose:   Recent Labs   Lab 07/08/24  0809 07/08/24  0443 07/08/24  0442 07/08/24  0108 07/07/24 2034 07/07/24  1558   * 136* 135* 107* 159* 137*       Blood Gas:   Recent Labs   Lab 07/07/24  0748   PHV 7.35   PCO2V 45   PO2V 59*   HCO3V 25   LASHAUN -0.5   O2PER 30       Culture Data No results for input(s): \"CULT\" in the last 168 hours.    Virology Data:   Lab Results   Component Value Date    FLUAH1 Not Detected 06/18/2024    FLUAH3 Not Detected 06/18/2024    HB8614 Not Detected 06/18/2024    IFLUB Not Detected 06/18/2024    RSVA Not Detected 06/18/2024    RSVB Not Detected 06/18/2024    PIV1 Not Detected 06/18/2024    PIV2 Not Detected 06/18/2024    PIV3 Not Detected 06/18/2024    HMPV Not Detected 06/18/2024    HRVS Negative 01/24/2021    ADVBE Negative 01/24/2021    ADVC Negative 01/24/2021    ADVC Negative 12/23/2020    ADVC Negative 10/07/2019       Historical CMV results (last 3 of prior testing):  Lab Results   Component Value Date    CMVQNT <35 (A) 06/18/2024    CMVQNT Not Detected 03/05/2024    CMVQNT Not Detected 12/19/2023     Lab Results   Component Value Date    CMVLOG 1.6 07/02/2024    CMVLOG 1.6 06/25/2024    CMVLOG <1.5 06/18/2024       Urine Studies    Recent Labs   Lab Test 06/19/24  1214 01/24/21  1729   URINEPH 6.5 5.0   NITRITE Negative Negative   LEUKEST Large* Moderate*   WBCU >182* 34*       Most Recent [place] Pulmonary Function Testing (FVC/FEV1 only)  FVC-Pre   Date Value Ref Range Status   02/14/2024 0.85 L    12/19/2023 1.04 L    11/21/2023 0.85 L    10/24/2023 0.96 L      FVC-%Pred-Pre   Date Value Ref Range Status   02/14/2024 29 %    12/19/2023 36 %    11/21/2023 " 29 %    10/24/2023 33 %      FEV1-Pre   Date Value Ref Range Status   02/14/2024 0.80 L    12/19/2023 0.89 L    11/21/2023 0.78 L    10/24/2023 0.87 L      FEV1-%Pred-Pre   Date Value Ref Range Status   02/14/2024 34 %    12/19/2023 38 %    11/21/2023 33 %    10/24/2023 37 %        IMAGING    Recent Results (from the past 48 hour(s))   XR Chest Port 1 View    Narrative    XR CHEST PORT 1 VIEW  7/8/2024 5:27 AM     HISTORY:  evaluate lung transplant       COMPARISON:  7/5/2024    TECHNIQUE: Portable, supine, frontal projection radiograph of the  chest.    FINDINGS:   Stable positioning of right IJ central venous catheter and  tracheostomy. Interval removal of feeding tube.    Stable cardiovascular silhouette. Sharp costophrenic angle. Trace  blunting of the right costophrenic angle. No appreciable pneumothorax.  Unchanged left greater than right streaky opacities.        Impression    IMPRESSION:  Interval removal of feeding tube. Otherwise stable support devices.  Stable opacities.    I have personally reviewed the examination and initial interpretation  and I agree with the findings.    REJI VIVAS DO         SYSTEM ID:  [id number]

## 2024-07-08 NOTE — PRE-PROCEDURE
GENERAL PRE-PROCEDURE:   Procedure:  LUE fistulogram    Written consent obtained?: Yes    Risks and benefits: Risks, benefits and alternatives were discussed    Consent given by:  Patient  Patient states understanding of procedure being performed: Yes    Patient's understanding of procedure matches consent: Yes    Procedure consent matches procedure scheduled: Yes    Expected level of sedation:  Moderate  Appropriately NPO:  Yes  ASA Class:  2  Mallampati  :  N/A- Alternate secured airway  Lungs:  Lungs clear with good breath sounds bilaterally  Heart:  Normal heart sounds and rate  History & Physical reviewed:  History and physical reviewed and no updates needed  Statement of review:  I have reviewed the lab findings, diagnostic data, medications, and the plan for sedation

## 2024-07-08 NOTE — PLAN OF CARE
ICU End of Shift Summary. See flowsheets for vital signs and detailed assessment.    Changes this shift: Neuro status unchanged. Denies pain. Sinus rhythm 80s-90s. MAP >65. PS for and hour and a half on 10/5 30%, will try again later after IR. CT of abdomen today. Went to IR for fistulagram/possible stent to help with the stenosis. Psych consulted today for ongoing anxiety with pressure support trials. 1 U of RBCs given for Hgb of 6.8. Pt and spouse updated at bedside.     Plan: Continue PS trials. Work with PT/OT. Continue with POC.       Goal Outcome Evaluation:      Plan of Care Reviewed With: patient, spouse    Overall Patient Progress: improvingOverall Patient Progress: improving    Outcome Evaluation: See note

## 2024-07-08 NOTE — PROGRESS NOTES
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  Status post airway stent placement by IP on 6/20. Hospital course complicated by hypotension, delirium, and prolonged respiratory failure.    Changes today:  - Patient to go to IR for LUE fistulogram with possible intervention, no HD today  - Midodrine decreased to 15 mg TID plus 5mg prn dose to be given overnight for persistent MAPS <60  - Health psychology consult placed for anxiety surrounding SBTs  - Continue BID SBT trials on pressure support 10/5 as tolerated  - Hopeful to discharge to LTACH this week  - Transfuse 1 unit pRBCs for hgb <7  - Continue to monitor plts in setting of subcutaneous heparin for DVT prophy, currently 50k and downtrending  - ENT to remove trach sutures and change dressing today per ENT  - CT chest without contrast per pulm transplant    PLAN:    Neuro:  #Pain   - Acetaminophen 325mg q4H    #Toxic metabolic encephalopathy, improving  Ongoing lethargy in setting of sepsis and delirium in the setting of high-dose steroid therapy. Patient reports improved sleep on current medications. Following commands this morning.   - Quetiapine 50 mg at bedtime   - Ramalteon and trazodone 25mg for sleep   - delirium precautions    #Anxiety  Patient has been having anxiety regarding pressure support trials and weaning from the ventilator.  and patient would like to talk to somebody while in the hospital. Will plan to consult Health Psychology tomorrow (as not here on  weekends).   - Health Psychology consult placed this morning    Pulmonary:  #Acute on chronic hypoxic hypercapnic respiratory failure, improving  #HAP, Stenotrophomonas maltophilia, Enterococcus faecalis, and Candida guilliermondi complex  #Severe pulmonary edema  #Acute respiratory distress syndrome, resolving  Presented to the ED on 6/18/2024 with acute on chronic hypoxic hypercapnic respiratory failure. Transferred to MICU and intubated on overnight on 6/18. Workup significant for Stenotrophomonas, Enterococcus, and Candida pneumonia. Course was c/b progression to ARDS (6/21-22) with elevated plateau pressures. Pt remain intubated d/t ongoing pulmonary edema iso ESRD on iHD. CXR (6/26) with similar pulmonary opacities compared to 6/24, left > right.  The etiology of this groundglass opacity was unclear but ddx includes rejection vs. infection vs. volume overload. Ongoing course breath sounds with some expiratory wheezes on exam.  - Antibiotics, as below  - Volume management with iHD  - Mechanical ventilation, wean as able  - Continue Daily SBT (AM and PM) on pressure support 10/5- tolerated 60 minutes yesterday am and 90 minutes in pm; RR in high 30s-40  - s/p tracheostomy 7/4-> ENT to remove sutures and perform trach dressing change today per ENT.  - Pulmonary toilet  - Mucomyst BID  - Hypertonic saline BID, alternate with mucomyst  - Albuterol neb QID  - Repeat CXR this morning- stable bilateral opacities  - Last VBG- (7/7)- pH 7.35, pCO2 45, pO2 90.7 on 30% FiO2, p/f ratio 302      Vent Mode: CMV/AC  (Continuous Mandatory Ventilation/ Assist Control)  FiO2 (%): 30 %  Resp Rate (Set): 18 breaths/min  Tidal Volume (Set, mL): 320 mL  PEEP (cm H2O): 5 cmH2O  Pressure Support (cm H2O): 10 cmH2O  Resp: 18      #Recurrent right middle bronchus stenosis s/p dilation and stent (6/20/2024)  Has history (bronchoscopy 2/15/24) demonstrating narrowing of right mainstem transplant anastomosis and bronchus intermedius. CT  chest (6/18/2024) and bronchoscopy (6/19/2024) demonstrated occlusion of right middle bronchus. With concern for post-obstructive pneumonia, interventional pulmonology was consulted, and completed bronchoscopy (6/20/2024) with tissue debulking, right middle bronchus balloon dilation to 11 mm, stent placement in right main bronchus, and bifurcating cast placement in right upper bronchus. Plan for saline nebs BID and outpatient bronchoscopy in 6 weeks.   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  - Transplant pulm consulted and following  - Prospera (7/3) pending  - DSA (7/3) pending  - Tacrolimus level to be ordered 7/8  - PTA nebs  - Fluticasone-viilanterol (breo ellipta) every day - HOLD with respiratory infection  - Montelukast every day   - Immunosuppressants per Tx Pulm:   - PTA Tacrolimus (dosing per tx pulm)  - PTA Prednisone 5 mg daily   - PTA azathioprine - HOLD per Transplant pulmonology with repeat infections  - Antibiotic prophylaxis   - Bactrim MWF for PJP ppx (monitor need to adjust pending HD plan)  - CMV weekly monitoring (qTuesday)  - Azithromycin per pulmonary   - Continue VGCV ppx (renally dosed, monitor need to adjust pending HD plan) with tentative plan for 3 weeks beyond completion of stress dose steroids   - CT chest w/o contrast per pulm transplant    Cardiovascular:  #Septic shock, improving  On 6/19/2024, developed sepsis (hypotension 80s/50s, tachypnea 24) in the setting of stenotrophomonas pneumonia/enterococcus faecium VRE UTI. Etiology of shock likely distributive iso sepsis; less likely hypovolemic (not pulling volumes with CRRT), medication-associated (weaned off of propofol gtt) or obstructive (low  suspicion for PE, echo 6/19 without evidence of cardiac dysfunction).   Pressor requirements slowly improving but are sluggish, driven by low diastolic pressure. Has been having transient episodes of MAPs <65 and into 50s overnight. Attempted to change timing of metoprolol to see if that improves patient's blood pressures, but could just be consistent with when she is sleeping. Episodes self-resolving by next blood pressure check.   - continue PTA prednisone 5 mg daily    Past course:  - Hydrocortisone 50mg q6H (6/21-6/26); 50 mg BID (6/26-6/28); 25 mg BID (6/28-7/3), 25 mg daily (7/3-7/5)  - Fludrocortisone 0.1 mg qday (6/21-6/28); 0.05 mg qday (6/28-30)  - Ongoing MAP 58 transiently overnight while sleeping which has been consistent over several days and resolves without intervention  - Decrease midodrine to 15 mg TID, 5 mg prn overnight if MAPs persistently <60      #Demand Ischemia, resolved    Presented with elevated troponin (peak 499). Not associated with chest pain or hypoxia. EKG without ST elevations/depressions or T wave inversions. Suspect demand ischemia in the setting of sepsis. Will continue to monitor symptoms and continuous telemetry. EKG 7/2 sinus rhythm with PACs.     #Paroxysmal A-Fib, improved  #Pulmonary Hypertension   History of pulmonary hypertension (45 mmHg, echo 10/2023) in the setting of ILD and paroxysmal afib on PTA metoprolol tartrate BID. Not on anticoagulation for afib.   - continue PTA metoprolol tartrate 25mg BID    GI/Nutrition:  #Nutrition  PEG-J placed by IR, continue tube feeds.   - Nutrition consult  - Tube feeds 30ml/hr; at goal    #Diarrhea, improving  On 6/21, had 8 bowel movements. Occurred in the setting of scheduled bowel regimen and starting new antibiotics (meropenem, levofloxacin). C diff negative.   Slowly improving.   - Holding PRN bowel regimen  - Miralax PRN   - Senna prn  - Trial of Nutrisource Fiber and monitor-3 packets daily per nutrition  recs      #Cholelithiasis with gallbladder wall thickening, resolved  During admission patient found to have up trending LFTs and fever in setting of GB wall thickening on CT abdomen/pelvis, now resolved. MRCP completed showing borderline dilated CBD up to 1.1 cm, no significant intrahepatic biliary dilation, no evidence of choledocholithiasis, no evidence of acute cholecystitis.    Renal/Fluids/Electrolytes:  #ESRD on iHD (M,W,F)  History of ESRD 2/2 Tacrolimus toxicity on HD (MWF). With soft blood pressures, placed RIJ (6/20/2024) and started CRRT (6/20/2024). US of AVF 7/5: arterial inflow patent without inflow stenosis, normal diameter of anastamosis, venous outflow elevated velocity at subclavian- suggestive of recurrent stenosis in venous outflow (area of prior angioplasty in March). IR consulted who noted that IR fistulogram not required at that time, and was able to run iHD on 7/6 off her fistula.   - Nephrology consulted and following for ESRD  - will follow recommendations for need of HD  - Renally-dosed medications  - BMP daily  - mild hypernatremia but stable  - HD on hold today pending IR for fistulagram with possible intervention for AVF venous side stenosis on US      Endocrine:  #Risk for hyperglycemia with stress-dose steroids, resolved  Stress dose steroids discontinued, resume PTA prednisone 5 mg daily.  - NPH 8U BID  -CTM     ID:  #HAP, Stenotrophomonas maltophilia, Enterococcus faecalis, Aspergillus, and Candida guilliermondi complex  Presented to the ED on 6/18/2024 with SOB and hypercapnic respiratory failure. Found on bronchoscopy (6/19) to have RML obstruction by narrowing bronchus intermedius as well as copious mucopurulent secretions/mucus plugging. Previously treated for Stenotrophonomas/aspergillus pneumonia in 11/2023 with subsequent sputum culture (2/2024) negative for both Stenotrophonomas/Aspergillus. Etiology likely repeat Stenotrophomonas infection vs opportunistic infection from  colonized microbe.   - Workup  - 6/18 sputum cx: Stenotrophomonas maltophilia (ceftazidime and levofloxacin R)  - 6/19 BAL cx: Stenotrophomonas maltophilia, Enterococcus faecalis (gentamicin R), Aspergillus (speciation in process)  - 6/21 BAL cx: Stenotrophomonas maltophilia and Enterococcus faecalis VRE  - 6/24 sputum cx: Stenotrophomonas maltophilia, Enterococcus faecalis VRE, and Candida guilliermondi complex  - Transplant pulmonology consult, appreciate recs  - Transplant ID consult, appreciate recs  - Awaiting susceptibilities on Aspergillus per transplant ID  - Present antibiotics   - Continue micafungin 150 mg/day and posaconazole 300 mg/day per pulm transplant (6/25 to present) for candida guillermondii and asergillus ochraceus on prior BAL  - Past antibiotics  - s/p IV minocycline 100mg BD x 14 days total (6/20-7/5)- for stenotrophamonas  - PO linezolid 600 mg BID x 10 days (6/25-7/5)- for VRE in urine and BAL cultures  - S/p ceftazidime (6/25-6/28)    - S/p vancomycin (6/18-6/20)   - S/p zosyn (6/18-21)  - S/p Daptomycin (6/21-6/23)  - S/p Meropenem (6/21-6/24)  - S/p Levofloxacin (6/21-6/23)    - BAL fluid (7/3): pink, hazy, cell counts normal range, fluid sent for viral, bacterial and fungal cultures negative to date, cytology negative for malignancy and organisms; preliminary AFB negative    #Pyuria, Enterococcus faecium VRE and  ESBL E. Coli   Asymptomatic. With progressive IV pressor needs, obtained broad infectious workup which demonstrated UCx (6/19/2024) with Enterococcus faecium VR and ESBL E. Coli. S/p Daptomycin (6/21-6/23) and Meropenem (6/21-6/24).    #Hx Aspergillus PNA (11/2023)  #Hx PJP PNA (1/2021)  #Hx CMV viremia, recurrent  #Hx EBV viremia  - Transplant ID consult, appreciate recs  PTA posaconazole (Treatment for hx of aspergillus PNA)  PTA azithromycin 250mg qd (CLAD ppx)  PTA bactrim (PJP ppx)     Hematology:    #Acute on Chronic Normocytic Anemia, stable  #Thrombocytopenia,  chronic  History of chronic anemia in the setting of kidney disease (baseline Hgb 10-11). Upon admission, Hgb 9. May be bone marrow suppression in the setting of chronic illness; potential contribution by blood loss with CRRT filter changes (~150-200c). Peripheral smear (6/18/2024) without evidence of schistocytes. Hemoglobin low today at 7.1, no signs of active bleeding. Additionally, platelets low at 52 today.   - Continue to monitor CBC in AM  - monitor if need to hold DVT prophylaxis in the AM  - Per nephrology, 4000 units epoetin daily  - Hgb 6.8 this morning- 1 pRBC ordered  - PLTs downtrending 50K (126 6/30--> 100 7/2--> 50 7/8); CTM- no signs of bleeding     Musculoskeletal:  - PT / OT consult     Skin:  - Sacral Blanching- off load with frequent turns in bed    General Cares/Prophylaxis:    DVT Prophylaxis: subcutaneous heparin  GI Prophylaxis: PPI  Restraints: None  Family Communication:  at bedside  Code Status: Full Code     Lines/tubes/drains:  - PIV x2, midline  - RIJ  - Tracheostomy  - PEG/J    Disposition:  - Medical ICU- stay for PS   - Care coordinator consulted regarding potential LTAC placement likely for this week    Patient seen and findings/plan discussed with medical ICU staff, Dr. Orozco.    Edin Davis MD  Internal Medicine Resident, PGY-1    Clinically Significant Risk Factors        # Hypokalemia: Lowest K = 3.3 mmol/L in last 2 days, will replace as needed       # Hypoalbuminemia: Lowest albumin = 2.2 g/dL at 6/21/2024  4:26 PM, will monitor as appropriate   # Thrombocytopenia: Lowest platelets = 50 in last 2 days, will monitor for bleeding                 #Precipitous drop in Hgb/Hct: Lowest Hgb this hospitalization: 6.8 g/dL. Will continue to monitor and treat/transfuse as appropriate.      # Moderate Malnutrition: based on nutrition assessment    # Financial/Environmental Concerns: none                ====================================  INTERVAL HISTORY:   No acute  events overnight. Noted to be hypotensive down to MAPs of 58, but self resolves by next check. She notes that she is sleeping well overnight. She denies pain. Pressure supported well yesterday afternoon at 10/5 for 90 minutes.    OBJECTIVE:   1. VITAL SIGNS:   Temp:  [96.8  F (36  C)-98.1  F (36.7  C)] 98.1  F (36.7  C)  Pulse:  [73-90] 79  Resp:  [18-37] 18  BP: ()/(38-71) 102/54  FiO2 (%):  [30 %] 30 %  SpO2:  [94 %-100 %] 100 %  Vent Mode: CMV/AC  (Continuous Mandatory Ventilation/ Assist Control)  FiO2 (%): 30 %  Resp Rate (Set): 18 breaths/min  Tidal Volume (Set, mL): 320 mL  PEEP (cm H2O): 5 cmH2O  Pressure Support (cm H2O): 10 cmH2O  Resp: 18    2. INTAKE/ OUTPUT:   I/O last 3 completed shifts:  In: 1600 [I.V.:220; NG/GT:660]  Out: -     3. PHYSICAL EXAMINATION:  General: Sitting up in bed, NAD   HEENT: Atraumatic, moist mucous membranes   Pulm/Resp: Ongoing coarse breath sounds, unchanged from prior exam. Increased expiratory wheezing, stable. Good air movement throughout.   CV: RRR, no m/r/g   Abdomen: Soft, non-distended, non-tender, bowel sounds present.  Ext: Trace bilateral LE edema, pulses 2+ radial, pedal  Incisions/Skin: No rashes or lesions, trach in place with oozing on gauze, no signs of bleeding  Neuro: Awake and alert, follows 1-step and 2-step commands and nods and shakes head to questions, moving all extremities equally    4. LABS:   Arterial Blood Gases   No lab results found in last 7 days.    Complete Blood Count   Recent Labs   Lab 07/08/24  0443 07/07/24  0430 07/06/24  0551 07/05/24  0339   WBC 2.6* 3.0* 5.2 4.7   HGB 6.8* 7.1* 8.1* 7.4*   PLT 50* 52* 75* 70*     Basic Metabolic Panel  Recent Labs   Lab 07/08/24  0443 07/08/24  0442 07/08/24  0108 07/07/24  2034 07/07/24  0742 07/07/24  0430 07/06/24  1149 07/06/24  1143 07/06/24  0740 07/06/24  0551   *  --   --   --   --  132*  --  131*  --  127*   POTASSIUM 3.5  --   --   --   --  3.5  --  3.3*  --  3.7   CHLORIDE 97*  --    --   --   --  99  --  97*  --  95*   CO2 23  --   --   --   --  24  --  25  --  21*   BUN 63.0*  --   --   --   --  41.7*  --  26.6*  --  55.5*   CR 2.62*  --   --   --   --  1.91*  --  1.28*  --  2.34*   * 135* 107* 159*   < > 115*   < > 151*   < > 131*    < > = values in this interval not displayed.     Liver Function Tests  Recent Labs   Lab 07/02/24  0359 07/01/24  1637   AST 26  --    ALT 37  --    ALKPHOS 176*  --    BILITOTAL 0.6  --    ALBUMIN 2.8* 2.7*     Coagulation Profile  No lab results found in last 7 days.      5. RADIOLOGY:   No results found for this or any previous visit (from the past 24 hour(s)).

## 2024-07-08 NOTE — CONSULTS
"    Interventional Radiology  [facility] Consult Service Note  07/08/24   9:12 AM    Consult Requested: \"ESRD, consider fistulogram for fistula with evidence of stenosis in subclavian vein on US.\"     Recommendations/Plan:    Patient is on IR schedule 7/8 for a LUE fistulogram possible intervention.   Labs WNL for procedure.  Orders entered for procedure, NPO status.  Consent will be done prior to procedure.     Please contact the IR charge RN at [phone] for estimated time of procedure.     Case and imaging discussed with IR attending, Dr. Rich.  Recommendations were reviewed with SCOTT Escobar.    History of Present Illness:  Kecia Blue is a 63 year old female s/p B lung tx for ILD 2018, COPD, ESRD on HD from tacro toxicity, in MICU for hypoxic hypercapneic resp failure-trach to vent, was on CRRT, now switched back to iHD 7/4 via LUE brachiocephalic access. ICU reports episode of prolonged bleeding after iHD 7/4 and again 7/6. IR consulted for fistulogram  Last IR intervention was 3/5/24 for similar complaint. US notes recurrent cephalic vein stenosis.     Pertinent Imaging Reviewed:     US Extremity Arterial Venous Dialys Acs Graft 7/5/24    Impression:  1. Arterial inflow: Patent without inflow stenosis.     2. Fistula anastomosis evaluation: Normal diameter, without evidence  of stenosis.     3. Venous outflow: Focal elevation of velocity at the cephalic  vein-subclavian confluence, suggesting recurrent stenosis in this  region after angioplasty on 3/5/2024.     I have personally reviewed the examination and initial interpretation  and I agree with the findings.     SANDY CURRY MD     Expected date of discharge:  TBD    Vitals:   /55   Pulse 77   Temp 98.1  F (36.7  C) (Axillary)   Resp 18   Ht 1.6 m (5' 3\")   Wt 51.6 kg (113 lb 12.1 oz)   LMP 06/07/2014 (Exact Date)   SpO2 100%   BMI 20.15 kg/m      Pertinent Labs:   Lab Results   Component Value Date    WBC 2.6 (L) " 07/08/2024    WBC 3.0 (L) 07/07/2024    WBC 5.2 07/06/2024    WBC 7.5 05/02/2021    WBC 6.8 04/30/2021    WBC 6.3 04/29/2021     Lab Results   Component Value Date    HGB 6.8 07/08/2024    HGB 7.1 07/07/2024    HGB 8.1 07/06/2024    HGB 10.6 05/02/2021    HGB 10.9 04/30/2021    HGB 11.1 04/29/2021     Lab Results   Component Value Date    PLT 50 07/08/2024    PLT 52 07/07/2024    PLT 75 07/06/2024     05/02/2021     04/30/2021     04/29/2021     Lab Results   Component Value Date    INR 0.98 06/18/2024    INR 0.9 08/16/2023    INR 0.9 08/16/2023    INR 0.99 04/29/2021    PTT 28 02/12/2021     Lab Results   Component Value Date    POTASSIUM 3.5 07/08/2024    POTASSIUM 3.8 10/17/2023    POTASSIUM 3.5 05/26/2022    POTASSIUM 4.3 05/07/2021        COVID-19 Antibody Results, Testing for Immunity           No data to display              COVID-19 PCR Results          9/26/2022    09:56 10/12/2022    15:28 7/11/2023    11:32 6/17/2024    13:37 6/18/2024    14:33 6/18/2024    17:29   COVID-19 PCR Results   COVID-19 Virus by PCR (External Result) Negative     Negative      Negative        Negative        Undetected         SARS CoV2 PCR   Negative   Negative  Negative       Details          This result is from an external source.               ADRIÁN Oneill CNP  Interventional Radiology  Pager: [phone]

## 2024-07-08 NOTE — PLAN OF CARE
"ICU End of Shift Summary. See flowsheets for vital signs and detailed assessment.    Changes this shift: 4804-1768: No signs of bleed, vitals stable, notified team, reports no need for hgb recheck at this time. IR came by at 1730 to change fistula dressing and remove anti-bleeding device, reports ok to use for HD tomorrow. CMV: 30%, peep 5. PST this evening for 35 mins 10/5, 30%, refused to do anymore due to \"too much happening today\". Attempted to continue encouraging, meditation, deep breathing with patient at bedside, pt continues to refuse.     Plan:  Monitor patient, continue to monitor and notify team with any changes. Continue with POC.    Goal Outcome Evaluation:      Plan of Care Reviewed With: spouse, patient    Overall Patient Progress: no changeOverall Patient Progress: no change    Outcome Evaluation: No acute changes. Trach exchanged at bedside. Continue with POC.      "

## 2024-07-08 NOTE — PLAN OF CARE
ICU End of Shift Summary. See flowsheets for vital signs and detailed assessment.    Changes this shift: No changes in neuro status. Sinus rhythm with PACs throughout shift. One BP with MAPs in low 50's, resolved without intervention within one hour. Hemoglobin 6.8 this morning. 1 unit PRBCs ordered to be transfused.     Plan:  Attempt Pressure Support BID. Continue to work with PT and OT. HD run today.     Goal Outcome Evaluation:      Plan of Care Reviewed With: patient    Overall Patient Progress: improvingOverall Patient Progress: improving    Outcome Evaluation: see note

## 2024-07-08 NOTE — PROGRESS NOTES
Care Management Follow Up    Length of Stay (days): 20    Expected Discharge Date:  TBD      Concerns to be Addressed: discharge planning LTACH  Patient plan of care discussed at interdisciplinary rounds:   Yes Anticipated Discharge Disposition: LTACH     Anticipated Discharge Services:  rehab, respiratory   Anticipated Discharge DME:  [facility]     Patient/family educated on Medicare website which has current facility and service quality ratings: yes  Education Provided on the Discharge Plan: Yes  Patient/Family in Agreement with the Plan: yes    Referrals Placed by CM/SW:  [facility]   Private pay costs discussed:  Per Insurance contract   Additional Information:  MICU 2 team has asked to start LTACH placement for anticipated discharge at the end of the week.   Per phone communication with  patient's , Ranjan ([phone]), confirmed family decision for [facility]. Referral sent to [facility] admission.       Saima Lucio, MSN, MA, CCM, RN  4C/4A/4E ICU Care Coordinator  Phone:[phone]  Pager: [phone]    For Weekend & Holiday on call RN Care Coordinator:  [facility] ([phone]) Saturday & Sunday; [phone]) [facility] Recognized Holidays   Weekend 4C/4A/4E ICUs /6A Care Coordinator  Pager: [phone]

## 2024-07-08 NOTE — PROGRESS NOTES
Otolaryngology Trach Change Note  July 8, 2024    Type of trach removed: 6-0 cuffed shiley  Type of trach placed: 6-0 cuffed shiley    Procedure note: Patient was suctioned via trach and secretions were removed.  Patient was appropriately positioned flat and supine. Trach ties/sutures were removed while holding trach in place. Patient was then hyperoxygenated via vent. Trach was removed without difficulty. Stoma appeared patent without obstruction or secretions. New trach was inserted with obturator without difficulty or resistance. Obturator was removed and inner cannula locked in place. Correct positioning of trach was confirmed by passing a suction through the trach (of note patient will have resistance with deep suctioning due to her bronchial stent), tracheoscopy was confirmed to confirm and obvious air movement was noted with good oxygen saturations. Trach tieswere placed and secured. Patient tolerated the trach change well and without complication.     - Expect some bloody secretions/oozing secondary to first trach change  - Stoma appears healthy  - Ok to perform routine trach cares, changes ponges/ties as needed  - Please ensure trach ties stay snug (to only allow 2 fingerbreadths) to avoid accidental decannulation/false passage.  - Future trach changes can be performed by RT     James Argueta MD  Otolaryngology-Head & Neck Surgery, PGY-5   20-Apr-2021

## 2024-07-08 NOTE — CONSULTS
"  Health Psychology                                                                                                                          Rossana Ceballos, Ph.D., L.P [phone]  Camilla Mayo, Ph.D., L.P. [phone]  Kaitlynn Montemayor, Ph.D, L..P. [phone]  Danielle Cordon, Ph.D., L.P. [phone]  Kian Peraza, Ph.D., A.B.P.P., L.P. [phone]         Erin Sarabia, Ph.D., L.P. 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  Status post airway stent placement by IP on 6/20. Hospital course complicated by hypotension, delirium, and prolonged respiratory failure.\"    Health psychology consulted by Dr. Davis with MICU team to support Kecia in managing symptoms of anxiety related to SBTs. Spoke with Dr. Davis prior to meeting with Kecia today.     SUBJECTIVE:  Met with Kecia today to introduce Health Psychology services and discuss nature of referral. We met individually for our visit.     Kecia said that today has been a harder day, feeling more tired, not exactly certain why. She said yesterday was better and she wonders if she did a lot of activity yesterday leaving her feeling more fatigued today. She said that while her experiences have been challenging she is seeing progress.     Reviewed experiences with breathing-related fear comparing experiences " prior to transplant to now. Validated Kecia's sense of discomfort and fear of being unable to breathe given past experiences. Guided her through two practices she can use during vent weaning process to mitigate anxiety and redirect her focus. Also dropped off two handouts and discussed when/how to use it.     Kecia was engaged in the visit and expressed understanding of information provided.       OBJECTIVE:  Kecia was lying in bed during our visit. She reported fair mood, affect mood- and thought-congruent. Thought processes logical and linear. Insight and judgment good. Communicated by mouthing words, nodding head. Denied suicidal ideation.        ASSESSMENT:  Kecia agreed she has symptoms of anxiety such as fear and worry as she engages in spontaneous breathing trials. We contextualized her fear and discussed specific coping strategies that may be useful.       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     Please feel free to call in urgent concerns arise prior to the next follow-up session.     Kaitlynn Montemayor, PhD,   Clinical Health Psychologist  Phone: [phone]  Pager: [phone]      Time in: 3:45  Time out: 4:05

## 2024-07-08 NOTE — PROCEDURES
[facility]    Procedure: IR Fistulogram    Date/Time: 7/8/2024 2:29 PM    Performed by: Chrissy Lobo MD  Authorized by: Kristine Ferrer MD      UNIVERSAL PROTOCOL   Site Marked: NA  Prior Images Obtained and Reviewed:  Yes  Required items: Required blood products, implants, devices and special equipment available    Patient identity confirmed:  Verbally with patient, arm band, provided demographic data and hospital-assigned identification number  Patient was reevaluated immediately before administering moderate or deep sedation or anesthesia  Confirmation Checklist:  Patient's identity using two indicators, relevant allergies, procedure was appropriate and matched the consent or emergent situation and correct equipment/implants were available  Time out: Immediately prior to the procedure a time out was called    Universal Protocol: the Joint Commission Universal Protocol was followed    Preparation: Patient was prepped and draped in usual sterile fashion    ESBL (mL):  20     ANESTHESIA    Anesthesia:  Local infiltration  Local Anesthetic:  Lidocaine 1% without epinephrine      SEDATION  Patient Sedated: Yes    Vital signs: Vital signs monitored during sedation    See dictated procedure note for full details.  Findings: Angioplasty subclavian stenosis to 8mm.    Specimens: none    Complications: None    Condition: Stable    Plan: Bedrest 2 hours.  Closure device in place. IR to remove.      PROCEDURE    Patient Tolerance:  Patient tolerated the procedure well with no immediate complications  Length of time physician/provider present for 1:1 monitoring during sedation: 60

## 2024-07-08 NOTE — PROGRESS NOTES
Patient Name: Kecia Blue  Medical Record Number: [id number]  Today's Date: 7/8/2024    Procedure: Fistulogram with angioplasty  Proceduralist: Dr. Ferrer and Dr. Lobo  Pathology present: n/a    Procedure Start: 1320  Procedure end: 1420  Sedation medications administered: 100 mcg fentayl and 2 mg versed     Report given to: James GIL  : n/a    Other Notes: Pt arrived to IR room 4 from . Consent reviewed. Pt denies any questions or concerns regarding procedure. Pt positioned supine and monitored per protocol. Pt tolerated procedure without any noted complications. Pt transferred back to .

## 2024-07-08 NOTE — PROGRESS NOTES
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 Nephrology consulted for management of HD while admitted.      Interval History :   Mrs Blue had CRRT stopped 7/2, stable on HD since with last run on 7/7.  Planning IR for fistulogram, will plan to run HD tomorrow with fistula and pull temp line assuming there are no issues on run.  Getting close to discharge to [facility].       Assessment & Recommendations:   ESRD-ESKD: pt dialyzes MWF at [facility] (ph [phone], f [phone]) with Dr. Glasgow. Run time: 3.5 hrs. EDW 52.5 kg. Access: L AVF. +heparin 1,500u bolus.                  -Appreciate team placing tri-alysis line     -Plan for day off today, HD ordered for tomorrow, 3h/2L                -No need for new dialysis consent, continuing long term HD for ESRD.                -Planning IR for fistulogram today with signs of stenosis on US, last intervention 11/2023.  Will use fistula for run tomorrow then pull temp line.       Outpt Rx:       Volume-Wt as low as 49kg during her course (although may have been dry at the time as she needed some neosynephrine), 51.6kg today.  Plan for run tomorrow, will pull enough UOP to challenge EDW.      Pulm-Admitted with hypercapnic resp failure, treated for PNA.      Electrolytes-Stable.     BMD-No acute issues.      Anemia-Hgb 6.8, last PRBC's 6/26 on venofer 50mg weekly but will hold while treating for infection. On Mircera 60mcg b4gbtah, will cover with short term 4k Epo with runs while admitted when stable enough for HD.       Nutrition-Novasource renal      Time spent: 50 minutes on this date of  "encounter for chart review, physical exam, medical decision making and co-ordination of care.      Seen and discussed with Dr Goodman     Recommendations were communicated to primary team via verbal communication.     ADRIÁN Lo CNS  Clinical Nurse Specialist  [phone]    Review of Systems:   I reviewed the following systems:  ROS not done due to vent/sedation    Physical Exam:   I/O last 3 completed shifts:  In: 1555 [I.V.:215; NG/GT:650]  Out: -    /55   Pulse 77   Temp 97.9  F (36.6  C) (Axillary)   Resp 18   Ht 1.6 m (5' 3\")   Wt 51.6 kg (113 lb 12.1 oz)   LMP 06/07/2014 (Exact Date)   SpO2 100%   BMI 20.15 kg/m       GENERAL APPEARANCE: Vent via trach   EYES: No scleral icterus  Pulmonary: Stable on vent  CV: Regular rhythm, normal rate   - Edema none  GI: soft, nontender, normal bowel sounds  MS: no evidence of inflammation in joints, no muscle tenderness  : No Basilio  SKIN: no rash, warm, dry  NEURO: No focal deficits    Labs:   All labs reviewed by me  Electrolytes/Renal -   Recent Labs   Lab Test 07/08/24  0809 07/08/24  0443 07/08/24  0442 07/07/24  0742 07/07/24  0430 07/06/24  1149 07/06/24  1143 07/06/24  0740 07/06/24  0551   NA  --  130*  --   --  132*  --  131*  --  127*   POTASSIUM  --  3.5  --   --  3.5  --  3.3*  --  3.7   CHLORIDE  --  97*  --   --  99  --  97*  --  95*   CO2  --  23  --   --  24  --  25  --  21*   BUN  --  63.0*  --   --  41.7*  --  26.6*  --  55.5*   CR  --  2.62*  --   --  1.91*  --  1.28*  --  2.34*   * 136* 135*   < > 115*   < > 151*   < > 131*   CHELSEY  --  8.1*  --   --  8.0*  --  8.5*  --  8.0*   MAG  --  1.9  --   --  1.8  --  1.7  --  2.1   PHOS  --  5.0*  --   --  3.9  --   --   --  5.1*    < > = values in this interval not displayed.       CBC -   Recent Labs   Lab Test 07/08/24  0815 07/08/24  0443 07/07/24  0430   WBC 3.2* 2.6* 3.0*   HGB 6.8* 6.8* 7.1*   PLT 56* 50* 52*       LFTs -   Recent Labs   Lab Test 07/02/24  0359 " 07/01/24  1637 07/01/24  0346 06/30/24  1527 06/30/24  0332   ALKPHOS 176*  --  173*  --  178*   BILITOTAL 0.6  --  0.6  --  0.8   ALT 37  --  36  --  38   AST 26  --  25  --  26   PROTTOTAL 5.4*  --  5.0*  --  4.9*   ALBUMIN 2.8* 2.7* 2.6*   < > 2.6*    < > = values in this interval not displayed.       Iron Panel -   Recent Labs   Lab Test 02/03/21  0415 12/13/18  1033 08/01/18  0921   IRON 51 16* 93   IRONSAT 36 7* 37   YOLA  --  302* 571*           Current Medications:  Current Facility-Administered Medications   Medication Dose Route Frequency Provider Last Rate Last Admin    acetaminophen (TYLENOL) tablet 325 mg  325 mg Oral or Feeding Tube Q4H Jailyn Lou MD   325 mg at 07/08/24 1009    acetylcysteine (MUCOMYST) 10 % nebulizer solution 4 mL  4 mL Inhalation BID Velez Reyes, German, MD   4 mL at 07/08/24 0918    azithromycin (ZITHROMAX) tablet 250 mg  250 mg Oral or Feeding Tube Daily Velez Reyes, German, MD   250 mg at 07/08/24 0738    calcium carbonate (TUMS) chewable tablet 500 mg  500 mg Oral Once per day on Sunday Tuesday Thursday Saturday Josesito Pratt MD   500 mg at 07/07/24 0730    chlorhexidine (PERIDEX) 0.12 % solution 15 mL  15 mL Mouth/Throat Q12H Chio Cortez MD   15 mL at 07/08/24 0738    epoetin alisha-epbx (RETACRIT) injection 4,000 Units  4,000 Units Intravenous Once per day on Monday Wednesday Friday Velez Reyes, German, MD   4,000 Units at 07/05/24 1150    fiber modular (NUTRISOURCE FIBER) packet 1 packet  1 packet Per Feeding Tube Q8H Awa Watt MD   1 packet at 07/08/24 0739    heparin ANTICOAGULANT injection 5,000 Units  5,000 Units Subcutaneous Q8H Cornell, Rossana, APRN CNP   5,000 Units at 07/08/24 0606    insulin aspart (NovoLOG) injection (RAPID ACTING)  1-12 Units Subcutaneous Q4H Edin Davis MD   1 Units at 07/08/24 0810    insulin NPH injection 8 Units  8 Units Subcutaneous BID Velez Reyes, German, MD   8 Units at 07/08/24 0810     levalbuterol (XOPENEX) neb solution 0.63 mg  0.63 mg Nebulization 4x Daily Gareth Mosquera MD   0.63 mg at 07/08/24 0918    [Held by provider] magnesium chloride CR tablet 1,070 mg  1,070 mg Oral Once per day on Sunday Tuesday Thursday Saturday Josesito Pratt MD        metoprolol tartrate (LOPRESSOR) tablet 25 mg  25 mg Oral BID Velez Reyes, German, MD   25 mg at 07/08/24 0738    micafungin (MYCAMINE) 150 mg in sodium chloride 0.9 % 100 mL intermittent infusion  150 mg Intravenous Q24H Chio Cortez  mL/hr at 07/08/24 0109 150 mg at 07/08/24 0109    midodrine (PROAMATINE) tablet 15 mg  15 mg Oral or Feeding Tube Q8H BMT Cyndi Sanon, DOMINIC        montelukast (SINGULAIR) tablet 10 mg  10 mg Oral At Bedtime Velez Reyes, German, MD   10 mg at 07/07/24 2100    multivitamin RENAL (RENAVITE RX/NEPHROVITE) tablet 1 tablet  1 tablet Oral or Feeding Tube Daily Awa Watt MD   1 tablet at 07/08/24 0738    pantoprazole (PROTONIX) 2 mg/mL suspension 40 mg  40 mg Per Feeding Tube Daily Randi James MD   40 mg at 07/07/24 2039    posaconazole (NOXAFIL) DR tablet TBEC 300 mg  300 mg Per Feeding Tube Daily Josesito Pratt MD   300 mg at 07/07/24 1421    predniSONE (DELTASONE) tablet 5 mg  5 mg Oral Daily Rossana Sarabia APRN CNP   5 mg at 07/08/24 0738    protein modular (PROSOURCE TF20) packet 1 packet  1 packet Per Feeding Tube Daily Edin Davis MD   1 packet at 07/08/24 0739    QUEtiapine (SEROquel) tablet 50 mg  50 mg Oral or Feeding Tube At Bedtime Kajal Chong APRN CNP   50 mg at 07/07/24 2100    ramelteon (ROZEREM) tablet 8 mg  8 mg Oral At Bedtime Velez Reyes, German, MD   8 mg at 07/07/24 2100    sodium chloride (NEBUSAL) 3 % neb solution 3 mL  3 mL Nebulization BID Velez Reyes, German, MD   3 mL at 07/07/24 1950    sodium chloride (PF) 0.9% PF flush 10 mL  10 mL Intracatheter Q8H Kajal Chong APRN CNP   10 mL at 07/08/24 0740    sodium chloride (PF) 0.9% PF flush 10  mL  10 mL Intracatheter Q8H Kajal Chong APRN CNP   10 mL at 07/08/24 0739    sulfamethoxazole-trimethoprim (BACTRIM) 400-80 MG per tablet 1 tablet  1 tablet Oral or Feeding Tube Once per day on Tuesday Thursday Saturday Randi James MD   1 tablet at 07/06/24 2126    tacrolimus (GENERIC) suspension 0.8 mg  0.8 mg Oral or Feeding Tube BID IS Yonny Orona MD   0.8 mg at 07/08/24 0739    traZODone (DESYREL) half-tab 25 mg  25 mg Per Feeding Tube At Bedtime Jailyn Lou MD   25 mg at 07/07/24 2100    valGANciclovir (VALCYTE) solution 450 mg  450 mg Per Feeding Tube Once per day on Tuesday Saturday Randi James MD   450 mg at 07/06/24 2112    Vitamin D3 (CHOLECALCIFEROL) tablet 50 mcg  50 mcg Oral Once per day on Monday Wednesday Friday Velez Reyes, German, MD   50 mcg at 07/08/24 1009     Current Facility-Administered Medications   Medication Dose Route Frequency Provider Last Rate Last Admin    dextrose 10% infusion   Intravenous Continuous PRN Velez Reyes, German, MD   Stopped at 07/03/24 1600    dextrose 10% infusion   Intravenous Continuous PRN Awa Watt MD   Stopped at 07/05/24 1400

## 2024-07-09 NOTE — PROGRESS NOTES
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  The patient was admitted on 24 for progressive hypoxia with dyspnea and worsening hypercapnia in ED requiring intubation likely d/t post-obstructive PNA 2/2 right bronchus stenosis.  Bronch confirming severe stenosis of right anastomosis with extensive RLL plugging.  IP bronch () with laser tissue debulking RMB stenosis, airway balloon dilation of RMB and BI, and placement of stent RMB to BI and bifurcating stent in RUL.  S/p volume resuscitation and transition to CRRT  for hypotension, stopped  and iHD resumed .  Increased agitation/delirium on  with increasing desaturation led to need for increased sedation.  Recurrence of paroxysmal afib with RVR first noted .  Remains intubated with pulmonary edema on imaging and septic shock requiring pressor support, limited/variable tolerance of PST.  S/p trach with ENT on 7/3 and GJ tube with IR on .  Anticipate eventual discharge to LTACH, referral sent.     Today's recommendations:  - ImmuKnow () pending, if higher might support trial of steroids although deferred for now given some improvement noted on repeat chest CT per Dr. Macias  - Repeat CXR ordered   - Health psych consult pending  -  Prospera and DSA (7/3) pending  - Tacrolimus level therapeutic, no dose adjustment, repeat level ordered 7/12  - Monitor need to adjust timing on Bactrim and VGCV ppx pending HD schedule  - Posaconazole and micafungin per transplant ID  - CMV weekly monitoring (qTuesday), continue VGCV ppx (renally dosed) with plan for 3 weeks beyond completion of stress dose steroids (through 7/26)  - Peripheral smear (7/9) pending     Septic shock:  Acute on chronic hypoxic/hypercapneic respiratory failure:  Post-obstructive multi-lobar PNA:  Right bronchial stenosis with RML collapse: Admitted with 2 weeks of progressive hypoxia, dyspnea, congested cough, and fatigue.  Chronic hypoxia with 2L NC, increased from 3 to 4L over this time with acute decompensation on day of admission associated with hypercapnia.  VBG 7/17/85 in ED s/p intubation.  IP bronch 2/15 s/p tissue debulking without stent placement.  Negative ID workup: respiratory panel, COVID, MRSA nares, PJP, Legionella, Strep pneumoniae, cocci, Histo, CrAg, fungitell x2, and A. galactomannan (blood).  IgG WNL.  Procal mildly elevated at 0.24 initially (then elevated to 1.77 6/20), LA normal, but febrile to 100.7.  TTE on admission grossly normal.  CT with RUL/RLL consolidative opacities, RML collapse, and narrowing of BI and distal RLL bronchus.  Started on norepi 6/19.  Bronch per MICU (6/19) with severe stenosis starting at right anastomosis, inspection with smaller scope revealing for extensive RLL mucous plugging.  Bronch per IP (6/20) with laser tissue debulking RMB stenosis, airway balloon dilation of RMB and BI, and placement of stent RMB to BI and bifurcating stent in RUL.  Intermittent/low grade fevers persisting.  Respiratory cultures with Steno, VSE, VRE, Aspergillus ochraceus, and Elidia guilliermondii.  Chest CT (6/29) with multifocal panlobar opacities and small bibasilar pleural effusions.  Remains on full vent support, limited/variable tolerance of PST.   S/p bronch with BAL to lingula.  S/p trach with ENT on 7/3 and GJ tube with IR on 7/5.  S/p stress dose steroids 6/21-7/5.  Chest CT (7/8) with waxing and waning mostly improved right lung consolidation now with diffuse groundglass organizing opacities mostly in lower and middle lobe more than upper lobe, slight improvement in peribronchiolar consolidation in LLL but increased groundglass organization in KONRAD, and grossly patent stents, ectatic descending thoracic aorta approximately 4 cm, and decreased pleural thickening lung bases.  - ImmuKnow (7/8) pending, if higher might support trial of steroids although deferred for now given some improvement noted on repeat   - BAL cultures (7/3 with Steno and Candida guilliermondii complex  - Repeat CXR ordered 7/11  - ABX: none; s/p linezolid (6/25-7/5), minocycline (6/20-7/5, Steno), ceftazidime (6/25-6/28), meropenem (6/21-6/24), daptomycin (6/21-6/24), levofloxacin (6/21-6/23), Zosyn (6/18-6/21), azithromycin 500 mg daily (6/18-6/20), and vancomycin (6/18)   - Nebs: levalbuterol QID, Mucomyst 10% BID alternating with 3% HTS (6/22), will need to be on NS nebs BID upon discharge per IP  - Vent management and weaning per MICU  - Palliative care following   - Health psych consult pending  - LTACH timing TBD pending clinical course     S/p bilateral sequential lung transplant (BSLT) for ILD:   Progressive CLAD: Most recent OP visit in February.  DSA negative 6/19 (last positive noted 2018).  Repeat ImmuKnow (6/19) 87, very low but IST adjustment deferred per Dr. Graham given acuity and steroids (after ImmuKnow level).  Repeat Prospera remains high at 2.3 on 6/19, may be artificially elevated with current infection, but also notably high at 2.42 on 2/14.  - Repeat Prospera (7/3) pending  - Repeat DSA (7/3) pending  - PTA Singulair, azithromycin, and Breo inhaler (resume once appropriate)     Immunosuppression:  On 2-drug IST since November d/t leukopenia and recurrent  infections  - Tacrolimus 0.8 mg BID.  Goal level 8-10.  Level 7/9 therapeutic at 9.5, no dose adjustment, repeat level ordered 7/12.  - Prednisone 5 mg daily (resumed 7/6 following stress dose steroids)     Prophylaxis:   - Bactrim 3x weekly for PJP ppx (monitor need to adjust timing pending HD schedule)     ID: H/o Actinomyces (10/17/23) and recurrent Steno (8/17/23 and 11/1/23).  S/p ABX as above.     Aspergillus ochraceus:  H/o Aspergillus empyema: S/p empyema drainage and ampho B instillation.  Previously on voriconazole, transitioned to posaconazole and managed by transplant ID as OP with last visit 3/13.  Calcified fungal elements remain with additional recurrent effusion (likely associated with fluid disturbances r/t iHD), but surgical intervention previously deferred d/t risk.  Posaconazole level 2.5 on 6/19.  Susceptibility testing done on fungal bronch culture 6/19 per transplant ID.  - Posaconazole 300 mg daily chronically, micafungin 150 mg daily (6/25) per transplant ID     H/o CMV viremia: Recurrent CMV viremia historically.  VGCV ppx most recently stopped 4/12.  Recent CMV low positive at 46 (6/12), <35 (6/18), 39 (6/25, 7/2) and now negative 7/9.  - CMV weekly monitoring (qTuesday) with steroid increase  - VGCV ppx (renally dosed, monitor need to adjust timing pending HD schedule) given stress dose steroids (6/24) with tentative plan for 3 weeks beyond completion of stress dose steroids (through 7/26)     EBV viremia: S/p rituximab 12/13/23 x1 dose.  Most recent EBV negative 6/18.     Dilated CBD:  Suspected acute cholecystitis: MRCP (6/29) with CBD dilation with stones and cholelithiasis without cholecystitis.  GI signed off 6/30, recommending general surgery consult if RUQ pain persists.     ESRD on iHD: Kidney evaluation started as OP.  On qMWF iHD schedule, no missed sessions.  Transitioned to CRRT on 6/20, stopped 7/2 and iHD resumed 7/4.  On scheduled midodrine given hypotension.  Management  "per renal and MICU with fluid removal as able.      Pancytopenia: WBC 2.6-3.2 since 7/7.  Platelets down to 45 on 7/9.  On VGCV as above.  Also intermittently requiring pRBC.   - CBC with differential daily, consider G-CSF if ANC </=0.8  - Peripheral smear (7/9) pending  - Management per MICU     VRE urine culture: Noted 6/19, >100k GNB and VRE faecium, ABX as above with management per transplant ID and MICU.    We appreciate the excellent care provided by the MICU team.  Recommendations communicated via in person and this note.  Will continue to follow along closely, please do not hesitate to call with any questions or concerns.    Patient discussed with Dr. Macias.    Cindy Mcadams PA-C  [facility]  Pulmonary CF/Transplant     Subjective & Interval History:     PS 10/5 FiO2 30% 1.5h in the morning and 35 minutes in the evening otherwise full vent support.  Increased cough, small amount of secretions.  Intermittent low BP.  Net positive 1.7L yesterday, weight up.    Review of Systems:     ROS limited due to communications barriers with trach    C: No fever, no chills  INTEGUMENTARY/SKIN: No rash or obvious new lesions  ENT/MOUTH: No sore throat, no nasal congestion or drainage  RESP: See interval history  CV: No chest pain, no peripheral edema  GI: No nausea, no vomiting, no change in stools  : + anuric  MUSCULOSKELETAL: No myalgias, no arthralgias  ENDOCRINE: Blood sugars with adequate control  NEURO: No headache, no numbness or tingling  PSYCHIATRIC: Mood stable    Physical Exam:     All notes, images, and labs from past 24 hours (at minimum) were reviewed.    Vital signs:  Temp: 98.4  F (36.9  C) Temp src: Oral BP: 129/65 Pulse: 77   Resp: 21 SpO2: 100 % O2 Device: Mechanical Ventilator Oxygen Delivery: 6 LPM Height: 160 cm (5' 3\") Weight: 53.7 kg (118 lb 6.2 oz)  I/O:   Intake/Output Summary (Last 24 hours) at 7/9/2024 1147  Last data filed at 7/9/2024 1100  Gross per 24 hour   Intake 1855 ml   Output 250 " ml   Net 1605 ml     Constitutional: Reclining in chair, in no apparent distress.   HEENT: Eyes with pink conjunctivae, anicteric.  Oral mucosa moist without lesions.  Trach in place.  PULM: Fair air flow bilaterally.  Coarse crackles t/o.  No rhonchi, no wheezes.  Non-labored breathing on full vent support.  CV: Normal S1 and S2.  RRR.  No murmur, gallop, or rub.  No peripheral edema.   ABD: NABS, soft, nondistended.    MSK: Moves all extremities.  + muscle wasting.   NEURO: Alert, conversant by mouthing words and nodding/shaking head yes/no to questions.  SKIN: Warm, dry, fragile.  PSYCH: Mood stable, calm.     Data:     LABS    CMP:   Recent Labs   Lab 07/09/24  0819 07/09/24  0415 07/09/24  0408 07/09/24  0024 07/08/24  0809 07/08/24  0443 07/07/24  0742 07/07/24  0430 07/06/24  1149 07/06/24  1143 07/06/24  0740 07/06/24  0551   NA  --   --  129*  --   --  130*  --  132*  --  131*  --  127*   POTASSIUM  --   --  3.4  --   --  3.5  --  3.5  --  3.3*  --  3.7   CHLORIDE  --   --  97*  --   --  97*  --  99  --  97*  --  95*   CO2  --   --  21*  --   --  23  --  24  --  25  --  21*   ANIONGAP  --   --  11  --   --  10  --  9  --  9  --  11   * 130* 129* 120*   < > 136*   < > 115*   < > 151*   < > 131*   BUN  --   --  80.2*  --   --  63.0*  --  41.7*  --  26.6*  --  55.5*   CR  --   --  3.15*  --   --  2.62*  --  1.91*  --  1.28*  --  2.34*   GFRESTIMATED  --   --  16*  --   --  20*  --  29*  --  47*  --  23*   CHELSEY  --   --  8.3*  --   --  8.1*  --  8.0*  --  8.5*  --  8.0*   MAG  --   --  1.9  --   --  1.9  --  1.8  --  1.7  --  2.1   PHOS  --   --  5.3*  --   --  5.0*  --  3.9  --   --   --  5.1*    < > = values in this interval not displayed.     CBC:   Recent Labs   Lab 07/09/24  0820 07/09/24  0408 07/08/24  0815 07/08/24  0443   WBC 3.1* 3.1* 3.2* 2.6*   RBC 2.45* 2.40* 2.17* 2.18*   HGB 8.0* 7.8* 6.8* 6.8*   HCT 23.3* 22.9* 21.9* 21.8*   MCV 95 95 101* 100   MCH 32.7 32.5 31.3 31.2   MCHC 34.3 34.1  "31.1* 31.2*   RDW 18.6* 18.8* 19.9* 19.6*   PLT 48* 45* 56* 50*       INR: No lab results found in last 7 days.    Glucose:   Recent Labs   Lab 07/09/24  0819 07/09/24  0415 07/09/24  0408 07/09/24  0024 07/08/24  2103 07/08/24  1611   * 130* 129* 120* 167* 157*       Blood Gas:   Recent Labs   Lab 07/07/24  0748   PHV 7.35   PCO2V 45   PO2V 59*   HCO3V 25   LASHAUN -0.5   O2PER 30       Culture Data No results for input(s): \"CULT\" in the last 168 hours.    Virology Data:   Lab Results   Component Value Date    FLUAH1 Not Detected 06/18/2024    FLUAH3 Not Detected 06/18/2024    FN1709 Not Detected 06/18/2024    IFLUB Not Detected 06/18/2024    RSVA Not Detected 06/18/2024    RSVB Not Detected 06/18/2024    PIV1 Not Detected 06/18/2024    PIV2 Not Detected 06/18/2024    PIV3 Not Detected 06/18/2024    HMPV Not Detected 06/18/2024    HRVS Negative 01/24/2021    ADVBE Negative 01/24/2021    ADVC Negative 01/24/2021    ADVC Negative 12/23/2020    ADVC Negative 10/07/2019       Historical CMV results (last 3 of prior testing):  Lab Results   Component Value Date    CMVQNT Not Detected 07/09/2024    CMVQNT <35 (A) 06/18/2024    CMVQNT Not Detected 03/05/2024     Lab Results   Component Value Date    CMVLOG 1.6 07/02/2024    CMVLOG 1.6 06/25/2024    CMVLOG <1.5 06/18/2024       Urine Studies    Recent Labs   Lab Test 06/19/24  1214 01/24/21  1729   URINEPH 6.5 5.0   NITRITE Negative Negative   LEUKEST Large* Moderate*   WBCU >182* 34*       Most Recent [place]ze Pulmonary Function Testing (FVC/FEV1 only)  FVC-Pre   Date Value Ref Range Status   02/14/2024 0.85 L    12/19/2023 1.04 L    11/21/2023 0.85 L    10/24/2023 0.96 L      FVC-%Pred-Pre   Date Value Ref Range Status   02/14/2024 29 %    12/19/2023 36 %    11/21/2023 29 %    10/24/2023 33 %      FEV1-Pre   Date Value Ref Range Status   02/14/2024 0.80 L    12/19/2023 0.89 L    11/21/2023 0.78 L    10/24/2023 0.87 L      FEV1-%Pred-Pre   Date Value Ref Range Status "   02/14/2024 34 %    12/19/2023 38 %    11/21/2023 33 %    10/24/2023 37 %        IMAGING    Recent Results (from the past 48 hour(s))   XR Chest Port 1 View    Narrative    XR CHEST PORT 1 VIEW  7/8/2024 5:27 AM     HISTORY:  evaluate lung transplant       COMPARISON:  7/5/2024    TECHNIQUE: Portable, supine, frontal projection radiograph of the  chest.    FINDINGS:   Stable positioning of right IJ central venous catheter and  tracheostomy. Interval removal of feeding tube.    Stable cardiovascular silhouette. Sharp costophrenic angle. Trace  blunting of the right costophrenic angle. No appreciable pneumothorax.  Unchanged left greater than right streaky opacities.        Impression    IMPRESSION:  Interval removal of feeding tube. Otherwise stable support devices.  Stable opacities.    I have personally reviewed the examination and initial interpretation  and I agree with the findings.    REJI VIVAS DO         SYSTEM ID:  [id number]   CT Chest w/o Contrast    Narrative    Chest CT without contrast    INDICATION: Evaluate bilateral lung opacities worsening compared to  previous CT per pulmonary transplant    COMPARISON: Most recent plain film earlier this morning. Most recent  available CT on PACS high-resolution study 6/18/2024    FINDINGS:  shows tracheostomy and median sternotomy. Opacities  are similar to the portable plain film noted is well.  No contrast. Dental artifact. The included thyroid appears  unremarkable. Tracheostomy appears appropriately positioned.  Atherosclerotic calcification left subclavian artery as well as right  brachiocephalic artery and in the thoracic aorta itself. Heart size  upper normal. Thoracic aorta is ectatic approximately 4.0 cm in its  mid ascending portion. Main pulmonary artery approximately 3.0 cm also  upper normal.  Mild left breast skin thickening. This is unchanged.  No enlarged axillary, mediastinal or discrete hilar lymph nodes. Mild  pleural thickening  bilaterally the lung bases appears slightly  decreased from the previous CT scan.  The included upper abdomen those right upper quadrant calcification  concerning for gallstone. Partially imaged tubing in the distal  stomach. Other atherosclerotic calcification in the splenic artery  which is quite tortuous.  Calcified right hilar lymph nodes.    Bone detail shows median sternotomy related to bilateral lung  transplant. Bones are osteopenic. Compression fracture T5 without  retropulsion and unchanged.  Small calcified right lower lobe superior segment granuloma. Mosaic  attenuation in the right upper lobe and superior segment right lower  lobe with increased groundglass organized opacities throughout the  basilar segments of the right lower lobe with some mosaic attenuation.  Left lung organizing groundglass opacities are present throughout the  entire lower lobe as well as much of the lingula and to lesser extent  left upper lobe proper. No other solid nodules. Calcified granuloma  left lower lobe. Rim calcified density at the left pleura posteriorly  unchanged.  Comparison with previous shows slight improvement in the right upper  lobe aeration inferiorly. There is also improved aeration in the  session the right lower lobe with the current organized opacities  previously appearing as dense consolidative opacities with air  bronchograms.  The left lung however show some increased groundglass opacification in  the upper lobe compared with previous in the lower lobe consolidative  opacities appear slightly decreased in the mosaicism appears similar.  Stent right mainstem bronchus and right upper lobe bronchus also in  rhonchorous intermedius. These do not appear significantly opacified  internally.  Right IJ catheter tip impression right atrium. Other vascular  calcification in the SVC as well.      Impression    IMPRESSION:  1. Waxing and waning mostly improved right lung consolidation now with  diffuse groundglass  organizing opacities mostly in the lower lobe an  middle lobe more than the upper lobe. Additionally, slight improvement  in the peribronchiolar consolidation in the left lower lobe but there  is increased groundglass organization in the left upper lobe. Grossly  patent stents right mainstem bronchus, proximal upper lobe bronchus  and bronchus intermedius.  2. Cholelithiasis.  3. Bilateral lung transplantation.  4. Atherosclerosis.  5. Tracheostomy.  6. Ectatic descending thoracic aorta approximately 4.0 cm.  7. Decreased pleural thickening lung bases.  8. Unchanged osteopenic T5 vertebral compression fracture.    TERRI ISSA MD         SYSTEM ID:  [id number]   IR Dialysis Fistulogram Left    Narrative    PROCEDURE: Dialysis circuit evaluation and interventions    Procedural Personnel  Attending physician(s): Kristine Hardy  Fellow physician(s): Chrissy Lobo  Resident physician(s): None  Advanced practice provider(s): None    Pre-procedure diagnosis: Fistula stenosis  Post-procedure diagnosis: Same  Indication: Prolonged bleeding at dialysis  Additional clinical history: None    Complications: No immediate complications.      Impression    IMPRESSION:    1. Segmental stenosis of the subclavian vein.  2. Uneventful balloon angioplasty of the subclavian stenosis.    Plan:    The graft can be used for dialysis immediately.  _______________________________________________________________    PROCEDURE SUMMARY:  - Access of dialysis circuit with ultrasound guidance  - Procedure: Angiogram and segment angioplasty  - Additional procedure(s): None    PROCEDURE DETAILS:    Pre-procedure  Consent: Informed consent for the procedure including risks, benefits  and alternatives was obtained and time-out was performed prior to the  procedure.  Preparation: The site was prepared and draped using maximal sterile  barrier technique including cutaneous antisepsis.    Anesthesia/sedation  Level of anesthesia/sedation: Moderate  sedation (conscious sedation)  Anesthesia/sedation administered by: Independent trained observer  under attending supervision with continuous monitoring of the  patient?s level of consciousness and physiologic status  Total intra-service sedation time (minutes): 60    Initial dialysis circuit evaluation  Dialysis circuit side: Left  Dialysis circuit type: Forearm loop graft  Initial circuit palpation: Palpable thrill    Access (towards outflow)  Local anesthesia was administered. The dialysis circuit was  sonographically evaluated. Real time ultrasound was used to vistualize  needle entry into the dialysis circuit and a permanent image was  stored.   Access technique: Micropuncture set with 21 gauge needle  Sheath size (T[place]): 7    Initial angiography  Initial angiography of the dialysis circuit, outflow veins, and  central veins was performed.  Findings: Segment stenosis of the subclavian vein.    Angioplasty  Location: Left subclavian vein  Angioplasty balloon: [place]  Angioplasty balloon type: Conventional  Balloon length (mm): 40  Balloon diameter (mm): 8    Persistent waist after angioplasty with 8 x 40 [place] balloon.    Additional angioplasty was performed sequentially with 6 x 40 and 8 x  40 [place] balloons.  Location: Left subclavian vein  Angioplasty balloon: [place]  Angioplasty balloon type: Conventional  Balloon length (mm): 40  Balloon diameter (mm): 6, 8    Final angiography  Findings: Complete resolution of the stenosis in left subclavian vein.  Final dialysis circuit palpation: Palpable thrill    Closure (outflow)  The sheath was removed and hemostasis was achieved with an external  device. A sterile dressing was applied.    Contrast  Contrast agent: Visipaque 320  Contrast volume (mL): 30    Radiation Dose  Fluoroscopy time (minutes): 8.3    Reference air kerma (mGy): 38.2   Kerma area product (uGy-m2): 229.66      Additional Details  Additional description of procedure: None  Registry  event: V/3/g  Device used: None  Equipment details: None  Unique Device Identifiers: Not available  Specimens removed: None  Estimated blood loss (mL): Less than 30  Standardized report: [email or web address]_v3.1    Attestation  Signer name: SANDY CURRY MD  I attest that I was present for the entire procedure. I reviewed the  stored images and agree with the report as written.

## 2024-07-09 NOTE — PROGRESS NOTES
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 Nephrology consulted for management of HD while admitted.      Interval History :   Mrs Blue had CRRT stopped 7/2, stable on HD since with last run on 7/7.  Appreciate IR doing fistulogram yesterday, will run via fistula today and pull temp line used for CRRT earlier in course.  Close to discharge to [facility], next planned run here 7/11 unless discharged.      Assessment & Recommendations:   ESRD-ESKD: pt dialyzes MWF at [facility] (ph [phone], f [phone]) with Dr. Glasgow. Run time: 3.5 hrs. EDW 52.5 kg. Access: L AVF. +heparin 1,500u bolus.                  -Appreciate team placing tri-alysis line     -Plan for HD today, 3h/2-3L                -No need for new dialysis consent, continuing long term HD for ESRD.                -Appreciate IR doing plasty yesterday to fistula, will run today and pull temp line assuming no issues.      Outpt Rx:       Volume-Wt as low as 49kg during her course (although may have been dry at the time as she needed some neosynephrine), ~53kg today, planning 2-3L of UF as BP's allow to challenge EDW.      Pulm-Admitted with hypercapnic resp failure, treated for PNA.      Electrolytes-Stable.     BMD-No acute issues.      Anemia-Hgb 8, last PRBC's 6/26 on venofer 50mg weekly but will hold while treating for infection. On Mircera 60mcg q2fdscg, will cover with short term 4k Epo with runs while admitted when stable enough for HD.       Nutrition-Novasource renal      Time spent: 50 minutes on this date of encounter for chart review, physical exam, medical  "decision making and co-ordination of care.      Seen and discussed with Dr Goodman     Recommendations were communicated to primary team via verbal communication.     ADRIÁN Lo CNS  Clinical Nurse Specialist  [phone]    Review of Systems:   I reviewed the following systems:  ROS not done due to vent/sedation    Physical Exam:   I/O last 3 completed shifts:  In: 1820 [I.V.:165; NG/GT:695]  Out: -    /72   Pulse 86   Temp 98  F (36.7  C) (Oral)   Resp 24   Ht 1.6 m (5' 3\")   Wt 53.7 kg (118 lb 6.2 oz)   LMP 06/07/2014 (Exact Date)   SpO2 100%   BMI 20.97 kg/m       GENERAL APPEARANCE: Vent via trach   EYES: No scleral icterus  Pulmonary: Stable on vent  CV: Regular rhythm, normal rate   - Edema none  GI: soft, nontender, normal bowel sounds  MS: no evidence of inflammation in joints, no muscle tenderness  : No Basilio  SKIN: no rash, warm, dry  NEURO: No focal deficits    Labs:   All labs reviewed by me  Electrolytes/Renal -   Recent Labs   Lab Test 07/09/24  0819 07/09/24  0415 07/09/24  0408 07/08/24  0809 07/08/24  0443 07/07/24  0742 07/07/24  0430   NA  --   --  129*  --  130*  --  132*   POTASSIUM  --   --  3.4  --  3.5  --  3.5   CHLORIDE  --   --  97*  --  97*  --  99   CO2  --   --  21*  --  23  --  24   BUN  --   --  80.2*  --  63.0*  --  41.7*   CR  --   --  3.15*  --  2.62*  --  1.91*   * 130* 129*   < > 136*   < > 115*   CHELSEY  --   --  8.3*  --  8.1*  --  8.0*   MAG  --   --  1.9  --  1.9  --  1.8   PHOS  --   --  5.3*  --  5.0*  --  3.9    < > = values in this interval not displayed.       CBC -   Recent Labs   Lab Test 07/09/24  0820 07/09/24  0408 07/08/24  0815   WBC 3.1* 3.1* 3.2*   HGB 8.0* 7.8* 6.8*   PLT 48* 45* 56*       LFTs -   Recent Labs   Lab Test 07/02/24  0359 07/01/24  1637 07/01/24  0346 06/30/24  1527 06/30/24  0332   ALKPHOS 176*  --  173*  --  178*   BILITOTAL 0.6  --  0.6  --  0.8   ALT 37  --  36  --  38   AST 26  --  25  --  26   PROTTOTAL 5.4*  " --  5.0*  --  4.9*   ALBUMIN 2.8* 2.7* 2.6*   < > 2.6*    < > = values in this interval not displayed.       Iron Panel -   Recent Labs   Lab Test 02/03/21  0415 12/13/18  1033 08/01/18  0921   IRON 51 16* 93   IRONSAT 36 7* 37   YOLA  --  302* 571*           Current Medications:  Current Facility-Administered Medications   Medication Dose Route Frequency Provider Last Rate Last Admin    acetaminophen (TYLENOL) tablet 975 mg  975 mg Oral or Feeding Tube Q8H Jailyn Lou MD        acetylcysteine (MUCOMYST) 10 % nebulizer solution 4 mL  4 mL Inhalation BID Velez Reyes, German, MD   4 mL at 07/09/24 0902    azithromycin (ZITHROMAX) tablet 250 mg  250 mg Oral or Feeding Tube Daily Velez Reyes, German, MD   250 mg at 07/09/24 0828    calcium carbonate (TUMS) chewable tablet 500 mg  500 mg Oral Once per day on Sunday Tuesday Thursday Saturday Josesito Pratt MD   500 mg at 07/09/24 0834    chlorhexidine (PERIDEX) 0.12 % solution 15 mL  15 mL Mouth/Throat Q12H Chio Cortez MD   15 mL at 07/09/24 0828    epoetin alisha-epbx (RETACRIT) injection 4,000 Units  4,000 Units Intravenous Once in dialysis/CRRT Hardy Tran APRN CNS        epoetin alisha-epbx (RETACRIT) injection 4,000 Units  4,000 Units Intravenous Once per day on Monday Wednesday Friday Velez Reyes, German, MD   4,000 Units at 07/05/24 1150    fiber modular (NUTRISOURCE FIBER) packet 1 packet  1 packet Per Feeding Tube Q8H Awa Watt MD   1 packet at 07/09/24 0829    heparin ANTICOAGULANT injection 5,000 Units  5,000 Units Subcutaneous Q8H Jailyn Lou MD   5,000 Units at 07/09/24 0616    insulin aspart (NovoLOG) injection (RAPID ACTING)  1-12 Units Subcutaneous Q4H Edin Davis MD   2 Units at 07/08/24 2105    insulin NPH injection 8 Units  8 Units Subcutaneous BID Velez Reyes, German, MD   8 Units at 07/09/24 0830    levalbuterol (XOPENEX) neb solution 0.63 mg  0.63 mg Nebulization 4x Daily Gareth Mosquera  MD Yeyo   0.63 mg at 07/09/24 0902    [Held by provider] magnesium chloride CR tablet 1,070 mg  1,070 mg Oral Once per day on Sunday Tuesday Thursday Saturday Josesito Pratt MD        metoprolol tartrate (LOPRESSOR) tablet 25 mg  25 mg Oral BID Velez Reyes, German, MD   25 mg at 07/09/24 0828    micafungin (MYCAMINE) 150 mg in sodium chloride 0.9 % 100 mL intermittent infusion  150 mg Intravenous Q24H Chio Cortez  mL/hr at 07/09/24 0010 150 mg at 07/09/24 0010    midodrine (PROAMATINE) tablet 15 mg  15 mg Oral or Feeding Tube Q8H [facility] Cyndi Sanon NP   15 mg at 07/09/24 0616    montelukast (SINGULAIR) tablet 10 mg  10 mg Oral At Bedtime Velez Reyes, German, MD   10 mg at 07/08/24 2106    multivitamin RENAL (RENAVITE RX/NEPHROVITE) tablet 1 tablet  1 tablet Oral or Feeding Tube Daily Awa Watt MD   1 tablet at 07/09/24 0828    No heparin via hemodialysis machine   Does not apply Once Hardy Tran APRN CNS        pantoprazole (PROTONIX) 2 mg/mL suspension 40 mg  40 mg Per Feeding Tube Daily Randi James MD   40 mg at 07/08/24 2106    posaconazole (NOXAFIL) DR tablet TBEC 300 mg  300 mg Per Feeding Tube Daily Josesito Pratt MD   300 mg at 07/08/24 1456    predniSONE (DELTASONE) tablet 5 mg  5 mg Oral Daily Rossana Sarabia APRN CNP   5 mg at 07/09/24 0828    protein modular (PROSOURCE TF20) packet 1 packet  1 packet Per Feeding Tube Daily Edin Davis MD   1 packet at 07/09/24 0829    QUEtiapine (SEROquel) tablet 50 mg  50 mg Oral or Feeding Tube At Bedtime Kajal Chong APRN CNP   50 mg at 07/08/24 2106    ramelteon (ROZEREM) tablet 8 mg  8 mg Oral At Bedtime Velez Reyes, German, MD   8 mg at 07/08/24 2106    sodium chloride (NEBUSAL) 3 % neb solution 3 mL  3 mL Nebulization BID Velez Reyes, German, MD   3 mL at 07/08/24 2101    sodium chloride (PF) 0.9% PF flush 10 mL  10 mL Intracatheter Q8H Kajal Chong APRN CNP   10 mL at 07/08/24 1612    sodium  chloride (PF) 0.9% PF flush 10 mL  10 mL Intracatheter Q8H Kajal Chong APRN CNP   10 mL at 07/09/24 0829    sodium chloride 0.9% BOLUS 250 mL  250 mL Intravenous Once in dialysis/CRRT Hardy Tran APRN CNS        sodium chloride 0.9% BOLUS 300 mL  300 mL Hemodialysis Machine Once Hardy Tran APRN CNS        sulfamethoxazole-trimethoprim (BACTRIM) 400-80 MG per tablet 1 tablet  1 tablet Oral or Feeding Tube Once per day on Tuesday Thursday Saturday Randi James MD   1 tablet at 07/06/24 2126    tacrolimus (GENERIC) suspension 0.8 mg  0.8 mg Oral or Feeding Tube BID IS Yonny Orona MD   0.8 mg at 07/09/24 0828    traZODone (DESYREL) half-tab 25 mg  25 mg Per Feeding Tube At Bedtime Jailyn Lou MD   25 mg at 07/08/24 2106    valGANciclovir (VALCYTE) solution 450 mg  450 mg Per Feeding Tube Once per day on Tuesday Saturday Randi James MD   450 mg at 07/06/24 2112    Vitamin D3 (CHOLECALCIFEROL) tablet 50 mcg  50 mcg Oral Once per day on Monday Wednesday Friday Velez Reyes, German, MD   50 mcg at 07/08/24 1009     Current Facility-Administered Medications   Medication Dose Route Frequency Provider Last Rate Last Admin    dextrose 10% infusion   Intravenous Continuous PRN Velez Reyes, German, MD   Stopped at 07/03/24 1600    dextrose 10% infusion   Intravenous Continuous PRN Awa Watt MD   Stopped at 07/05/24 1400    Stop Heparin 60 minutes before end of treatment   Does not apply Continuous PRN Hardy Tran APRN CNS

## 2024-07-09 NOTE — PLAN OF CARE
Goal Outcome Evaluation:     ICU End of Shift Summary. See flowsheets for vital signs and detailed assessment.    Changes this shift: No acute changes this shift, pt slept quite a bit throughout the day, NEW DND ORDER FROM [id number]! HD run today, up to chair with therapy for about 2 hours, attempted pressure support this AM, pt wanted to switch back after ~20 minutes. Neph order in place to pull trialysis line after HD run.    Plan: Goal is to move her to [facility] by the end of the week, pull trialysis line, continue with POC.

## 2024-07-09 NOTE — PROGRESS NOTES
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  Status post airway stent placement by IP on 6/20. Hospital course complicated by hypotension, delirium, and prolonged respiratory failure.    Changes today:  - Do Not Disturb order placed for overnight to promote patient sleep  - Patient to have HD today with UF 2-3L per nephrology  - Continue midodrine 15 mg TID plus 5 mg prn overnight for MAPs<60  - Health psychology consulted for anxiety surrounding SBTs  - Continue BID SBT trials on pressure support 10/5 as tolerated  - Hopeful to discharge to LTACH this week  - Continue to monitor plts, currently 45k and downtrending    PLAN:    Neuro:  #Pain   - Switch Acetaminophen scheduling to 1 gram q8h to minimize nightly disturbances    #Toxic metabolic encephalopathy, improving  Ongoing lethargy in setting of sepsis and delirium in the setting of high-dose steroid therapy. Patient reports improved sleep on current medications. Following commands this morning.   - Quetiapine 50 mg at bedtime   - Ramalteon and trazodone 25mg for sleep - change medications timing   - delirium precautions  - Do not disturb order overnight to promote sleep    #Anxiety  Patient has been having anxiety regarding pressure support trials and weaning from the ventilator.  and patient would like to talk to somebody while in the hospital. Will plan to consult Health Psychology tomorrow (as not here on weekends).   - Health Psychology  consulted    Pulmonary:  #Acute on chronic hypoxic hypercapnic respiratory failure, improving  #HAP, Stenotrophomonas maltophilia, Enterococcus faecalis, and Candida guilliermondi complex  #Severe pulmonary edema  #Acute respiratory distress syndrome, resolving  Presented to the ED on 6/18/2024 with acute on chronic hypoxic hypercapnic respiratory failure. Transferred to MICU and intubated on overnight on 6/18. Workup significant for Stenotrophomonas, Enterococcus, and Candida pneumonia. Course was c/b progression to ARDS (6/21-22) with elevated plateau pressures. Pt remain intubated d/t ongoing pulmonary edema iso ESRD on iHD. CXR (6/26) with similar pulmonary opacities compared to 6/24, left > right.  The etiology of this groundglass opacity was unclear but ddx includes rejection vs. infection vs. volume overload. Ongoing course breath sounds with some expiratory wheezes on exam.  - Antibiotics, as below  - Volume management with iHD  - Mechanical ventilation, wean as able  - Continue Daily SBT (AM and PM) on pressure support 10/5- tolerated 60 minutes yesterday am and 90 minutes in pm; RR in high 30s-40  - s/p tracheostomy 7/4-> ENT removed sutures and perform trach dressing change 7/8  - Pulmonary toilet  - Mucomyst BID  - Hypertonic saline BID, alternate with mucomyst  - Albuterol neb QID  - Repeat CXR this morning- stable bilateral opacities  - Last VBG- (7/7)- pH 7.35, pCO2 45, pO2 90.7 on 30% FiO2, p/f ratio 302  - CT chest w/o contrast 7/8: Waxing and waning mostly improved right lung consolidation now with diffuse groundglass organizing opacities mostly in the lower lobe an middle lobe more than the upper lobe. Slight improvement in the peribronchiolar consolidation in the left lower lobe but there is increased groundglass organization in the left upper lobe.       Vent Mode: CMV/AC  (Continuous Mandatory Ventilation/ Assist Control)  FiO2 (%): 30 %  Resp Rate (Set): 18 breaths/min  Tidal Volume (Set, mL):  320 mL  PEEP (cm H2O): 5 cmH2O  Pressure Support (cm H2O): 10 cmH2O  Resp: 20      #Recurrent right middle bronchus stenosis s/p dilation and stent (6/20/2024)  Has history (bronchoscopy 2/15/24) demonstrating narrowing of right mainstem transplant anastomosis and bronchus intermedius. CT chest (6/18/2024) and bronchoscopy (6/19/2024) demonstrated occlusion of right middle bronchus. With concern for post-obstructive pneumonia, interventional pulmonology was consulted, and completed bronchoscopy (6/20/2024) with tissue debulking, right middle bronchus balloon dilation to 11 mm, stent placement in right main bronchus, and bifurcating cast placement in right upper bronchus. Plan for saline nebs BID and outpatient bronchoscopy in 6 weeks.   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  - Transplant pulm consulted and following  - Prospera (7/3) pending  - DSA (7/3) pending  - Tacrolimus level to be ordered 7/8  - PTA nebs  - Fluticasone-viilanterol (breo ellipta) every day - HOLD with respiratory infection  - Montelukast every day   - Immunosuppressants per Tx Pulm:   - PTA Tacrolimus (dosing per tx pulm)  - PTA Prednisone 5 mg daily   - PTA azathioprine - HOLD per Transplant pulmonology with repeat infections  - Antibiotic prophylaxis   - Bactrim MWF for PJP ppx (monitor need to adjust pending HD plan)  - CMV weekly monitoring (qTuesday)  - Azithromycin per pulmonary   - Continue VGCV ppx (renally dosed, monitor need to adjust pending HD plan) with tentative plan for 3 weeks beyond completion of stress dose steroids   - CT chest w/o contrast 7/8 [er above  - Per transplant pulm, not starting steroids at this time    Cardiovascular:  #Septic  shock, improving  On 6/19/2024, developed sepsis (hypotension 80s/50s, tachypnea 24) in the setting of stenotrophomonas pneumonia/enterococcus faecium VRE UTI. Etiology of shock likely distributive iso sepsis; less likely hypovolemic (not pulling volumes with CRRT), medication-associated (weaned off of propofol gtt) or obstructive (low suspicion for PE, echo 6/19 without evidence of cardiac dysfunction).   Pressor requirements slowly improving but are sluggish, driven by low diastolic pressure. Has been having transient episodes of MAPs <65 and into 50s overnight. Attempted to change timing of metoprolol to see if that improves patient's blood pressures, but could just be consistent with when she is sleeping. Episodes self-resolving by next blood pressure check.   - continue PTA prednisone 5 mg daily    Past course:  - Hydrocortisone 50mg q6H (6/21-6/26); 50 mg BID (6/26-6/28); 25 mg BID (6/28-7/3), 25 mg daily (7/3-7/5)  - Fludrocortisone 0.1 mg qday (6/21-6/28); 0.05 mg qday (6/28-30)  - Ongoing MAP 58 transiently overnight while sleeping which has been consistent over several days and resolves without intervention  - Continue midodrine to 15 mg TID, 5 mg prn overnight if MAPs persistently <60      #Demand Ischemia, resolved    Presented with elevated troponin (peak 499). Not associated with chest pain or hypoxia. EKG without ST elevations/depressions or T wave inversions. Suspect demand ischemia in the setting of sepsis. Will continue to monitor symptoms and continuous telemetry. EKG 7/2 sinus rhythm with PACs.     #Paroxysmal A-Fib, improved  #Pulmonary Hypertension   History of pulmonary hypertension (45 mmHg, echo 10/2023) in the setting of ILD and paroxysmal afib on PTA metoprolol tartrate BID. Not on anticoagulation for afib.   - continue PTA metoprolol tartrate 25mg BID    GI/Nutrition:  #Nutrition  PEG-J placed by IR, continue tube feeds.   - Nutrition consult  - Tube feeds 30ml/hr; at goal    #Diarrhea,  improving  On 6/21, had 8 bowel movements. Occurred in the setting of scheduled bowel regimen and starting new antibiotics (meropenem, levofloxacin). C diff negative.   Slowly improving.   - Holding PRN bowel regimen  - Miralax PRN   - Senna prn  - Trial of Nutrisource Fiber and monitor-3 packets daily per nutrition recs      #Cholelithiasis with gallbladder wall thickening, resolved  During admission patient found to have up trending LFTs and fever in setting of GB wall thickening on CT abdomen/pelvis, now resolved. MRCP completed showing borderline dilated CBD up to 1.1 cm, no significant intrahepatic biliary dilation, no evidence of choledocholithiasis, no evidence of acute cholecystitis.    Renal/Fluids/Electrolytes:  #ESRD on iHD (M,W,F)  History of ESRD 2/2 Tacrolimus toxicity on HD (MWF). With soft blood pressures, placed RIJ (6/20/2024) and started CRRT (6/20/2024). US of AVF 7/5: arterial inflow patent without inflow stenosis, normal diameter of anastamosis, venous outflow elevated velocity at subclavian- suggestive of recurrent stenosis in venous outflow (area of prior angioplasty in March). IR consulted who noted that IR fistulogram not required at that time, and was able to run iHD on 7/6 off her fistula.   - Nephrology consulted and following for ESRD  - will follow recommendations for need of HD  - Renally-dosed medications  - BMP daily  - mild hypernatremia but stable  - Fistulogram and subclavian angioplasty yesterday with IR  - Net +1.7L yesterday  - HD today per nephrology  -Will discuss with nephrology possibility of temporary dialysis line removal after HD today      Endocrine:  #Risk for hyperglycemia with stress-dose steroids, resolved  Stress dose steroids discontinued, resume PTA prednisone 5 mg daily.  - NPH 8U BID  -CTM     ID:  #HAP, Stenotrophomonas maltophilia, Enterococcus faecalis, Aspergillus, and Candida guilliermondi complex  Presented to the ED on 6/18/2024 with SOB and hypercapnic  respiratory failure. Found on bronchoscopy (6/19) to have RML obstruction by narrowing bronchus intermedius as well as copious mucopurulent secretions/mucus plugging. Previously treated for Stenotrophonomas/aspergillus pneumonia in 11/2023 with subsequent sputum culture (2/2024) negative for both Stenotrophonomas/Aspergillus. Etiology likely repeat Stenotrophomonas infection vs opportunistic infection from colonized microbe.   - Workup  - 6/18 sputum cx: Stenotrophomonas maltophilia (ceftazidime and levofloxacin R)  - 6/19 BAL cx: Stenotrophomonas maltophilia, Enterococcus faecalis (gentamicin R), Aspergillus (speciation in process)  - 6/21 BAL cx: Stenotrophomonas maltophilia and Enterococcus faecalis VRE  - 6/24 sputum cx: Stenotrophomonas maltophilia, Enterococcus faecalis VRE, and Candida guilliermondi complex  - Transplant pulmonology consult, appreciate recs  - Transplant ID consult, appreciate recs  - Awaiting susceptibilities on Aspergillus per transplant ID  - Present antibiotics   - Continue micafungin 150 mg/day and posaconazole 300 mg/day per pulm transplant (6/25 to present) for candida guillermondii and asergillus ochraceus on prior BAL  - Past antibiotics  - s/p IV minocycline 100mg BD x 14 days total (6/20-7/5)- for stenotrophamonas  - PO linezolid 600 mg BID x 10 days (6/25-7/5)- for VRE in urine and BAL cultures  - S/p ceftazidime (6/25-6/28)    - S/p vancomycin (6/18-6/20)   - S/p zosyn (6/18-21)  - S/p Daptomycin (6/21-6/23)  - S/p Meropenem (6/21-6/24)  - S/p Levofloxacin (6/21-6/23)    - BAL fluid (7/3): pink, hazy, cell counts normal range, fluid sent for viral, bacterial and fungal cultures negative to date, cytology negative for malignancy and organisms; preliminary AFB negative      #Pyuria, Enterococcus faecium VRE and  ESBL E. Coli   Asymptomatic. With progressive IV pressor needs, obtained broad infectious workup which demonstrated UCx (6/19/2024) with Enterococcus faecium VR and ESBL E.  Coli. S/p Daptomycin (6/21-6/23) and Meropenem (6/21-6/24).    #Hx Aspergillus PNA (11/2023)  #Hx PJP PNA (1/2021)  #Hx CMV viremia, recurrent  #Hx EBV viremia  - Transplant ID consult, appreciate recs  PTA posaconazole (Treatment for hx of aspergillus PNA)  PTA azithromycin 250mg qd (CLAD ppx)  PTA bactrim (PJP ppx)  -CMV PVR pending per pulm transplant     Hematology:    #Acute on Chronic Normocytic Anemia, stable  #Thrombocytopenia, chronic  History of chronic anemia in the setting of kidney disease (baseline Hgb 10-11). Upon admission, Hgb 9. May be bone marrow suppression in the setting of chronic illness; potential contribution by blood loss with CRRT filter changes (~150-200c). Peripheral smear (6/18/2024) without evidence of schistocytes. Hemoglobin low today at 7.1, no signs of active bleeding. Additionally, platelets low at 52 today.   - Continue to monitor CBC in AM  - monitor if need to hold DVT prophylaxis in the AM  - Per nephrology, 4000 units epoetin daily  - Hgb improved this morning (7.8) after transfusion 1 unit pRBC yesterday  - PLTs downtrending 45K (126 6/30--> 100 7/2--> 50 7/8 -> 45 7/9); CTM- no signs of bleeding     Musculoskeletal:  - PT / OT consult     Skin:  - Sacral Blanching- off load with frequent turns in bed    General Cares/Prophylaxis:    DVT Prophylaxis: subcutaneous heparin    GI Prophylaxis: PPI  Restraints: None  Family Communication:  to be updated by phone  Code Status: Full Code     Lines/tubes/drains:  - PIV x2, midline  - RIJ  - Tracheostomy  - PEG/J    Disposition:  - Medical ICU- stay for PS   - Care coordinator consulted regarding potential LTAC placement likely end of this week    Patient seen and findings/plan discussed with medical ICU staff, Dr. Orozco.    Edin Davis MD  Internal Medicine Resident, PGY-1    Clinically Significant Risk Factors         # Hyponatremia: Lowest Na = 129 mmol/L in last 2 days, will monitor as appropriate      #  Hypoalbuminemia: Lowest albumin = 2.2 g/dL at 6/21/2024  4:26 PM, will monitor as appropriate   # Thrombocytopenia: Lowest platelets = 45 in last 2 days, will monitor for bleeding                 #Precipitous drop in Hgb/Hct: Lowest Hgb this hospitalization: 6.8 g/dL. Will continue to monitor and treat/transfuse as appropriate.      # Moderate Malnutrition: based on nutrition assessment    # Financial/Environmental Concerns: none                ====================================  INTERVAL HISTORY:   No acute events overnight. Patient again noted to be hypotensive down to MAPs of 58, but self resolves by next check. Patient had fistulogram and angioplasty of L subclavian yesterday, plan for HD today. She denies pain. Pressure supported well yesterday afternoon at 10/5 for 30 minutes due to fatigue after IR procedure.    OBJECTIVE:   1. VITAL SIGNS:   Temp:  [96.9  F (36.1  C)-98.7  F (37.1  C)] 97.2  F (36.2  C)  Pulse:  [74-89] 79  Resp:  [16-20] 20  BP: ()/(42-93) 114/57  FiO2 (%):  [30 %] 30 %  SpO2:  [96 %-100 %] 100 %  Vent Mode: CMV/AC  (Continuous Mandatory Ventilation/ Assist Control)  FiO2 (%): 30 %  Resp Rate (Set): 18 breaths/min  Tidal Volume (Set, mL): 320 mL  PEEP (cm H2O): 5 cmH2O  Pressure Support (cm H2O): 10 cmH2O  Resp: 20    2. INTAKE/ OUTPUT:   I/O last 3 completed shifts:  In: 1745 [I.V.:160; NG/GT:655]  Out: -     3. PHYSICAL EXAMINATION:  General: Sitting up in bed, NAD   HEENT: Atraumatic, moist mucous membranes   Pulm/Resp: Ongoing coarse breath sounds, unchanged from prior exam. Stable expiratory wheezing. Good air movement throughout.   CV: RRR, no m/r/g   Abdomen: Soft, non-distended, non-tender, bowel sounds present.  Ext: Trace bilateral LE edema, pulses 2+ radial, pedal  Incisions/Skin: No rashes or lesions, trach in place with oozing on gauze, no signs of bleeding  Neuro: Awake and alert, follows 1-step and 2-step commands and nods and shakes head to questions, moving all  extremities equally    4. LABS:   Arterial Blood Gases   No lab results found in last 7 days.    Complete Blood Count   Recent Labs   Lab 07/09/24  0408 07/08/24  0815 07/08/24  0443 07/07/24  0430   WBC 3.1* 3.2* 2.6* 3.0*   HGB 7.8* 6.8* 6.8* 7.1*   PLT 45* 56* 50* 52*     Basic Metabolic Panel  Recent Labs   Lab 07/09/24  0415 07/09/24  0408 07/09/24  0024 07/08/24  2103 07/08/24  0809 07/08/24  0443 07/07/24  0742 07/07/24  0430 07/06/24  1149 07/06/24  1143   NA  --  129*  --   --   --  130*  --  132*  --  131*   POTASSIUM  --  3.4  --   --   --  3.5  --  3.5  --  3.3*   CHLORIDE  --  97*  --   --   --  97*  --  99  --  97*   CO2  --  21*  --   --   --  23  --  24  --  25   BUN  --  80.2*  --   --   --  63.0*  --  41.7*  --  26.6*   CR  --  3.15*  --   --   --  2.62*  --  1.91*  --  1.28*   * 129* 120* 167*   < > 136*   < > 115*   < > 151*    < > = values in this interval not displayed.     Liver Function Tests  No lab results found in last 7 days.    Coagulation Profile  No lab results found in last 7 days.      5. RADIOLOGY:   Recent Results (from the past 24 hour(s))   CT Chest w/o Contrast    Narrative    Chest CT without contrast    INDICATION: Evaluate bilateral lung opacities worsening compared to  previous CT per pulmonary transplant    COMPARISON: Most recent plain film earlier this morning. Most recent  available CT on PACS high-resolution study 6/18/2024    FINDINGS:  shows tracheostomy and median sternotomy. Opacities  are similar to the portable plain film noted is well.  No contrast. Dental artifact. The included thyroid appears  unremarkable. Tracheostomy appears appropriately positioned.  Atherosclerotic calcification left subclavian artery as well as right  brachiocephalic artery and in the thoracic aorta itself. Heart size  upper normal. Thoracic aorta is ectatic approximately 4.0 cm in its  mid ascending portion. Main pulmonary artery approximately 3.0 cm also  upper normal.  Mild  left breast skin thickening. This is unchanged.  No enlarged axillary, mediastinal or discrete hilar lymph nodes. Mild  pleural thickening bilaterally the lung bases appears slightly  decreased from the previous CT scan.  The included upper abdomen those right upper quadrant calcification  concerning for gallstone. Partially imaged tubing in the distal  stomach. Other atherosclerotic calcification in the splenic artery  which is quite tortuous.  Calcified right hilar lymph nodes.    Bone detail shows median sternotomy related to bilateral lung  transplant. Bones are osteopenic. Compression fracture T5 without  retropulsion and unchanged.  Small calcified right lower lobe superior segment granuloma. Mosaic  attenuation in the right upper lobe and superior segment right lower  lobe with increased groundglass organized opacities throughout the  basilar segments of the right lower lobe with some mosaic attenuation.  Left lung organizing groundglass opacities are present throughout the  entire lower lobe as well as much of the lingula and to lesser extent  left upper lobe proper. No other solid nodules. Calcified granuloma  left lower lobe. Rim calcified density at the left pleura posteriorly  unchanged.  Comparison with previous shows slight improvement in the right upper  lobe aeration inferiorly. There is also improved aeration in the  session the right lower lobe with the current organized opacities  previously appearing as dense consolidative opacities with air  bronchograms.  The left lung however show some increased groundglass opacification in  the upper lobe compared with previous in the lower lobe consolidative  opacities appear slightly decreased in the mosaicism appears similar.  Stent right mainstem bronchus and right upper lobe bronchus also in  rhonchorous intermedius. These do not appear significantly opacified  internally.  Right IJ catheter tip impression right atrium. Other vascular  calcification in  the SVC as well.      Impression    IMPRESSION:  1. Waxing and waning mostly improved right lung consolidation now with  diffuse groundglass organizing opacities mostly in the lower lobe an  middle lobe more than the upper lobe. Additionally, slight improvement  in the peribronchiolar consolidation in the left lower lobe but there  is increased groundglass organization in the left upper lobe. Grossly  patent stents right mainstem bronchus, proximal upper lobe bronchus  and bronchus intermedius.  2. Cholelithiasis.  3. Bilateral lung transplantation.  4. Atherosclerosis.  5. Tracheostomy.  6. Ectatic descending thoracic aorta approximately 4.0 cm.  7. Decreased pleural thickening lung bases.  8. Unchanged osteopenic T5 vertebral compression fracture.    TERRI ISSA MD         SYSTEM ID:  [id number]   IR Dialysis Fistulogram Left    Narrative    PROCEDURE: Dialysis circuit evaluation and interventions    Procedural Personnel  Attending physician(s): Kristine Hardy  Fellow physician(s): Chrissy Lobo  Resident physician(s): None  Advanced practice provider(s): None    Pre-procedure diagnosis: Fistula stenosis  Post-procedure diagnosis: Same  Indication: Prolonged bleeding at dialysis  Additional clinical history: None    Complications: No immediate complications.      Impression    IMPRESSION:    1. Segmental stenosis of the subclavian vein.  2. Uneventful balloon angioplasty of the subclavian stenosis.    Plan:    The graft can be used for dialysis immediately.  _______________________________________________________________    PROCEDURE SUMMARY:  - Access of dialysis circuit with ultrasound guidance  - Procedure: Angiogram and segment angioplasty  - Additional procedure(s): None    PROCEDURE DETAILS:    Pre-procedure  Consent: Informed consent for the procedure including risks, benefits  and alternatives was obtained and time-out was performed prior to the  procedure.  Preparation: The site was prepared and draped  using maximal sterile  barrier technique including cutaneous antisepsis.    Anesthesia/sedation  Level of anesthesia/sedation: Moderate sedation (conscious sedation)  Anesthesia/sedation administered by: Independent trained observer  under attending supervision with continuous monitoring of the  patient?s level of consciousness and physiologic status  Total intra-service sedation time (minutes): 60    Initial dialysis circuit evaluation  Dialysis circuit side: Left  Dialysis circuit type: Forearm loop graft  Initial circuit palpation: Palpable thrill    Access (towards outflow)  Local anesthesia was administered. The dialysis circuit was  sonographically evaluated. Real time ultrasound was used to vistualize  needle entry into the dialysis circuit and a permanent image was  stored.   Access technique: Micropuncture set with 21 gauge needle  Sheath size (Chinese): 7    Initial angiography  Initial angiography of the dialysis circuit, outflow veins, and  central veins was performed.  Findings: Segment stenosis of the subclavian vein.    Angioplasty  Location: Left subclavian vein  Angioplasty balloon: [place]  Angioplasty balloon type: Conventional  Balloon length (mm): 40  Balloon diameter (mm): 8    Persistent waist after angioplasty with 8 x 40 [place] balloon.    Additional angioplasty was performed sequentially with 6 x 40 and 8 x  40 [place] balloons.  Location: Left subclavian vein  Angioplasty balloon: [place]  Angioplasty balloon type: Conventional  Balloon length (mm): 40  Balloon diameter (mm): 6, 8    Final angiography  Findings: Complete resolution of the stenosis in left subclavian vein.  Final dialysis circuit palpation: Palpable thrill    Closure (outflow)  The sheath was removed and hemostasis was achieved with an external  device. A sterile dressing was applied.    Contrast  Contrast agent: Visipaque 320  Contrast volume (mL): 30    Radiation Dose  Fluoroscopy time (minutes): 8.3    Reference air  kerma (mGy): 38.2   Kerma area product (uGy-m2): 229.66      Additional Details  Additional description of procedure: None  Registry event: V/3/g  Device used: None  Equipment details: None  Unique Device Identifiers: Not available  Specimens removed: None  Estimated blood loss (mL): Less than 30  Standardized report: [email or web address]_v3.1    Attestation  Signer name: SANDY CURRY MD  I attest that I was present for the entire procedure. I reviewed the  stored images and agree with the report as written.

## 2024-07-09 NOTE — PROGRESS NOTES
Pt placed on PS 5/10 this morning and flipped back after 20 min due to Pt request. Pt she feel SOB and increased WOB with PS per Pt.

## 2024-07-09 NOTE — PLAN OF CARE
ICU End of Shift Summary. See flowsheets for vital signs and detailed assessment.    Changes this shift: Appears to have slept well. CMV 30%. MAPs in the 50s after HS medications, resolved without intervention and team aware. Incontinent of stool x2.     Plan: Continue with plan of care. HD today.

## 2024-07-09 NOTE — PROGRESS NOTES
Date: 7/9/2024  Time: 1632     Data:  Pre Wt:   53.7 kg, bed scale  Desired Wt:  to removed 2-3  Vascular Access Status: Fistula patent  Dialyzer Rinse:  Light streaked   Total Dialysis (Treatment) Time:   3.5 Hrs  Dialysate Bath: K 4, Ca 3  ,   Heparin: Heparin: None     Lab:   No    Interventions:  Dialysis done through left AV Fistula using 15 gauge needles  Epo 4,000 units administered per MAR  Takeover by Debra  Assessment:  A & O x trached, calm and cooperative, denies pain  left arm AV Fistula/ graft has good thrill and bruit                Plan:    Per Renal team      WENDI COOLEY, LOYDAN, RN  Acute Dialysis RN  [facility]

## 2024-07-09 NOTE — PROGRESS NOTES
CLINICAL NUTRITION SERVICES - REASSESSMENT NOTE     Nutrition Prescription    RECOMMENDATIONS FOR MDs/PROVIDERS TO ORDER:  None currently     Malnutrition Status:    Moderate malnutrition in the context of acute illness/disease    Recommendations already ordered by Registered Dietitian (RD):  Out of liters of Novasource Renal so using open system. Guidelines for open EN support system:  Instructions for RN - copy & paste in Goal Rate or Advancement Instruction (comments) line:   Please abide by 8-hour hang-time for food safety. Pour 237 mL (1 carton) of formula into TF pump bag per 8-hour batch.   Number of cartons for NSA to send - copy & paste into Amount to Send (Nutrition Use):  Please send 3 cartons daily - this accounts for discarded formula per batch.     Future/Additional Recommendations:  Monitor electrolytes, stool output     EVALUATION OF THE PROGRESS TOWARD GOALS   Diet: NPO + TF    Nutrition Support: access: ZAINA thornton     Formula/schedule/modulars: 7/3 -__: Novasource Renal (or equivalent) @ 30 ml/hr (720 ml) + 1 Prosource Tf20 provides 1520 kcal (29 kcal/kg), 85 g pro (1.6 g/kg), 132 g CHO, 516 ml free water, and 0 g fiber daily     -7/3: Goal TF: Novasource Renal (or equivalent) @ 30 ml/hr (720 ml) + 2 Prosource Tf20 provides 1600 kcal (30 kcal/kg), 105 g pro (2.0g/kg), 132 g CHO, 516 ml free water, and 0 g fiber daily.        -  : Goal TF: Novasource Renal (or equivalent) @ 30 ml/hr (720 ml) + 1 Prosource Tf20 provides 1520 kcal (29 kcal/kg), 85 g pro (1.6 g/kg), 132 g CHO, 516 ml free water, and 0 g fiber daily.       Free water flushes: 30 mL every 4 hours    Intake/Tolerance: Tube Feedin day average tube feeding infusion of 600 mL (83 % of goal volume) + 5 day average intake of 0.8 Prosource TF20 packet(s) (80 % of prescribed packets)  = average intake of 1264 kcal/day and 71 g protein/day.  Pt reports feeling fine w/ TF, no nutrition questions/concerns for writer today.       NEW FINDINGS     GI:  Last BM: 07/09/24  Nutrisource fiber 1 pkt Q8H = 9 g fiber daily   Stool occurrences: 1x 7/8, 1x 7/7, 4x 7/6, 4x 7/5, 3x 7/4, 1x 7/3     Weight:  Most Recent Weight: 53.7 kg (118 lb 6.2 oz)  on 7/9/24 via Bed scale  Body mass index is 20.97 kg/m .  Wt up 2.9 kg from admission. + 5.1 L from admission per I/O.     Meds:  Nutrisource fiber 1 pkt Q8H   SSI Q4H; NPH insulin  Micafungin  Renal multivitamin; vitamin D 50 mcg MWF  Protonix  Prednisone  Bactrim  Tacrolimus  Valcyte    Labs:   07/06/24 11:43 07/06/24 14:22 07/07/24 04:30 07/08/24 04:43 07/09/24 04:08   Sodium 131 (L)  132 (L) 130 (L) 129 (L)   Potassium 3.3 (L)  3.5 3.5 3.4   Chloride 97 (L)  99 97 (L) 97 (L)   Carbon Dioxide (CO2) 25  24 23 21 (L)   Urea Nitrogen 26.6 (H)  41.7 (H) 63.0 (H) 80.2 (H)   Creatinine 1.28 (H)  1.91 (H) 2.62 (H) 3.15 (H)   GFR Estimate 47 (L)  29 (L) 20 (L) 16 (L)   Calcium 8.5 (L)  8.0 (L) 8.1 (L) 8.3 (L)   Anion Gap 9  9 10 11   Magnesium 1.7  1.8 1.9 1.9   Phosphorus   3.9 5.0 (H) 5.3 (H)   Calcium Ionized Whole Blood  4.6      Glucose 151 (H)  115 (H) 136 (H) 129 (H)       Renal:  HD    Respiratory:   trach ; PST of 60-90 minutes per chart review    MALNUTRITION  % Intake: Decreased intake does not meet criteria  % Weight Loss: Weight loss does not meet criteria for malnutrition   Subcutaneous Fat Loss: Facial region:  mild and Thoracic/intercostal:  mild   Muscle Loss: Temporal:  mild, Thoracic region (clavicle, acromium bone, deltoid, trapezius, pectoral):  mild, Upper arm (bicep, tricep):  mild, and Lower arm  (forearm):  mild  Fluid Accumulation/Edema: Mild  Malnutrition Diagnosis: Moderate malnutrition in the context of acute illness/disease    Previous Goals   Total avg nutritional intake to meet a minimum of 25 kcal/kg and 1.5 g PRO/kg daily (per dosing wt 52.8 kg).  Evaluation: Not met but improving since last assessment    Previous Nutrition Diagnosis  Inadequate oral intake related to  "intubation as evidenced by NPO with nutrition support needed to meet nutrition needs.   Evaluation: No change    CURRENT NUTRITION DIAGNOSIS  Inadequate oral intake related to trach, oxygen needs as evidenced by NPO with nutrition support needed to meet nutrition needs.       INTERVENTIONS  Implementation  Collaboration with other providers  Enteral Nutrition - continue      Goals  Total avg nutritional intake to meet a minimum of 25 kcal/kg and 1.5 g PRO/kg daily (per dosing wt 52.8 kg).    Monitoring/Evaluation  Progress toward goals will be monitored and evaluated per protocol.      Olesya Velasco MS, RDN, LD  4C MICU RD  Vocera - \"4C Clinical Dietitian\"  Weekend/Holiday RD - \"Weekend Clinical Dietitian\"  "

## 2024-07-10 NOTE — PROGRESS NOTES
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 Nephrology consulted for management of HD while admitted.      Interval History :   Mrs Blue had HD yesterday after venoplasty on 7/8, had some difficulty cannulating fistula and also some prolonged bleeding time.  Will hold on repeat eval for now but if she has difficulty with run tomorrow we can consider US to make sure flows are reasonable.  Can pull HD line given fistula is working reasonably well.         Assessment & Recommendations:   ESRD-ESKD: pt dialyzes MWF at [facility] (ph [phone], f [phone]) with Dr. Glasgow. Run time: 3.5 hrs. EDW 52.5 kg. Access: L AVF. +heparin 1,500u bolus.                  -Can pull temp HD line.       -Holding on run today,                 -No need for new dialysis consent, continuing long term HD for ESRD.                -Appreciate IR doing plasty yesterday to fistula, will run today and pull temp line assuming no issues.      Outpt Rx:       Volume-Wt as low as 49kg during her course (although may have been dry at the time as she needed some neosynephrine), EDW likely a bit lower with ICU stay ~50-51kg.      Pulm-Admitted with hypercapnic resp failure, treated for PNA. Trached, progressing well.       Electrolytes-Stable.     BMD-No acute issues.      Anemia-Hgb 7.5, last PRBC's 6/26 on venofer 50mg weekly but will hold while treating for infection. On Mircera 60mcg [id number], will cover with short term 4k Epo with runs while admitted when stable enough for HD.       Nutrition-Novasource renal      Time spent: 50 minutes on this date of encounter  "for chart review, physical exam, medical decision making and co-ordination of care.      Seen and discussed with Dr Goodman     Recommendations were communicated to primary team via verbal communication.     ADRIÁN Lo CNS  Clinical Nurse Specialist  [phone]    Review of Systems:   I reviewed the following systems:  ROS not done due to vent/sedation    Physical Exam:   I/O last 3 completed shifts:  In: 1630 [I.V.:275; NG/GT:695]  Out: 2150 [Other:1900; Stool:250]   /60   Pulse 83   Temp 97.4  F (36.3  C) (Oral)   Resp 22   Ht 1.6 m (5' 3\")   Wt 52.1 kg (114 lb 13.8 oz)   LMP 06/07/2014 (Exact Date)   SpO2 100%   BMI 20.35 kg/m       GENERAL APPEARANCE: Vent via trach   EYES: No scleral icterus  Pulmonary: Stable on vent  CV: Regular rhythm, normal rate   - Edema none  GI: soft, nontender, normal bowel sounds  MS: no evidence of inflammation in joints, no muscle tenderness  : No Basilio  SKIN: no rash, warm, dry  NEURO: No focal deficits    Labs:   All labs reviewed by me  Electrolytes/Renal -   Recent Labs   Lab Test 07/10/24  0822 07/10/24  0418 07/10/24  0416 07/09/24 2012 07/09/24 2007 07/09/24  0415 07/09/24  0408 07/08/24  0809 07/08/24  0443 07/07/24  0742 07/07/24  0430   NA  --   --  130*  --  133*  --  129*  --  130*  --  132*   POTASSIUM  --   --  3.3*  --  3.6  --  3.4  --  3.5  --  3.5   CHLORIDE  --   --  98  --  100  --  97*  --  97*  --  99   CO2  --   --  26  --  25  --  21*  --  23  --  24   BUN  --   --  38.1*  --  30.6*  --  80.2*  --  63.0*  --  41.7*   CR  --   --  1.80*  --  1.46*  --  3.15*  --  2.62*  --  1.91*   * 156* 150*   < > 182*   < > 129*   < > 136*   < > 115*   CHELSEY  --   --  8.4*  --  8.8  --  8.3*  --  8.1*  --  8.0*   MAG  --   --  1.9  --  1.6*  --  1.9  --  1.9  --  1.8   PHOS  --   --   --   --   --   --  5.3*  --  5.0*  --  3.9    < > = values in this interval not displayed.       CBC -   Recent Labs   Lab Test 07/10/24  0416 07/09/24  0820 " 07/09/24  0408   WBC 2.2* 3.1* 3.1*   HGB 7.5* 8.0* 7.8*   PLT 47* 48* 45*       LFTs -   Recent Labs   Lab Test 07/02/24  0359 07/01/24  1637 07/01/24  0346 06/30/24  1527 06/30/24  0332   ALKPHOS 176*  --  173*  --  178*   BILITOTAL 0.6  --  0.6  --  0.8   ALT 37  --  36  --  38   AST 26  --  25  --  26   PROTTOTAL 5.4*  --  5.0*  --  4.9*   ALBUMIN 2.8* 2.7* 2.6*   < > 2.6*    < > = values in this interval not displayed.       Iron Panel -   Recent Labs   Lab Test 02/03/21  0415 12/13/18  1033 08/01/18  0921   IRON 51 16* 93   IRONSAT 36 7* 37   YOLA  --  302* 571*           Current Medications:  Current Facility-Administered Medications   Medication Dose Route Frequency Provider Last Rate Last Admin    acetaminophen (TYLENOL) tablet 975 mg  975 mg Oral or Feeding Tube Q8H Jailyn Lou MD   975 mg at 07/10/24 0557    acetylcysteine (MUCOMYST) 10 % nebulizer solution 4 mL  4 mL Inhalation BID Velez Reyes, German, MD   4 mL at 07/10/24 0729    [Held by provider] azithromycin (ZITHROMAX) tablet 250 mg  250 mg Oral or Feeding Tube Daily Velez Reyes, German, MD   250 mg at 07/09/24 0828    calcium carbonate (TUMS) chewable tablet 500 mg  500 mg Oral Once per day on Sunday Tuesday Thursday Saturday Josesito Pratt MD   500 mg at 07/09/24 0834    chlorhexidine (PERIDEX) 0.12 % solution 15 mL  15 mL Mouth/Throat Q12H Chio Cortez MD   15 mL at 07/10/24 0823    epoetin alisha-epbx (RETACRIT) injection 4,000 Units  4,000 Units Intravenous Once per day on Monday Wednesday Friday Velez Reyes, German, MD   4,000 Units at 07/09/24 1606    fiber modular (NUTRISOURCE FIBER) packet 1 packet  1 packet Per Feeding Tube Q8H Awa Watt MD   1 packet at 07/10/24 0557    heparin ANTICOAGULANT injection 5,000 Units  5,000 Units Subcutaneous Q8H Jailyn Lou MD   5,000 Units at 07/10/24 0557    insulin aspart (NovoLOG) injection (RAPID ACTING)  1-12 Units Subcutaneous Q4H Edin Davis  MD   1 Units at 07/10/24 0823    insulin NPH injection 8 Units  8 Units Subcutaneous BID Velez Reyes, German, MD   8 Units at 07/10/24 0823    levalbuterol (XOPENEX) neb solution 0.63 mg  0.63 mg Nebulization 4x Daily Gareth Mosquera MD   0.63 mg at 07/10/24 0729    [Held by provider] magnesium chloride CR tablet 1,070 mg  1,070 mg Oral Once per day on Sunday Tuesday Thursday Saturday Josesito Pratt MD        metoprolol tartrate (LOPRESSOR) tablet 25 mg  25 mg Oral BID Velez Reyes, German, MD   25 mg at 07/10/24 0823    midodrine (PROAMATINE) tablet 10 mg  10 mg Oral or Feeding Tube Q8H BMT Jailyn Lou MD        montelukast (SINGULAIR) tablet 10 mg  10 mg Oral At Bedtime Velez Reyes, German, MD   10 mg at 07/09/24 2128    multivitamin RENAL (RENAVITE RX/NEPHROVITE) tablet 1 tablet  1 tablet Oral or Feeding Tube Daily Awa Watt MD   1 tablet at 07/10/24 0823    pantoprazole (PROTONIX) 2 mg/mL suspension 40 mg  40 mg Per Feeding Tube Daily Randi James MD   40 mg at 07/09/24 2015    posaconazole (NOXAFIL) DR tablet TBEC 300 mg  300 mg Per Feeding Tube Daily Josesito Pratt MD   300 mg at 07/09/24 1416    predniSONE (DELTASONE) tablet 5 mg  5 mg Oral Daily Rossana Sarabia APRN CNP   5 mg at 07/10/24 0823    protein modular (PROSOURCE TF20) packet 1 packet  1 packet Per Feeding Tube Daily Edin Davis MD   1 packet at 07/10/24 0823    QUEtiapine (SEROquel) tablet 50 mg  50 mg Oral or Feeding Tube At Bedtime Kajal Chong APRN CNP   50 mg at 07/09/24 2128    ramelteon (ROZEREM) tablet 8 mg  8 mg Oral At Bedtime Velez Reyes, German, MD   8 mg at 07/09/24 2015    sodium chloride (NEBUSAL) 3 % neb solution 3 mL  3 mL Nebulization BID Velez Reyes, German, MD   3 mL at 07/09/24 1957    sodium chloride (PF) 0.9% PF flush 10 mL  10 mL Intracatheter Q8H Kajal Chong APRN CNP   10 mL at 07/08/24 1612    sodium chloride (PF) 0.9% PF flush 10 mL  10 mL Intracatheter Q8H Arlette  ADRIÁN Rdz CNP   10 mL at 07/10/24 0824    sulfamethoxazole-trimethoprim (BACTRIM) 400-80 MG per tablet 1 tablet  1 tablet Oral or Feeding Tube Once per day on Tuesday Thursday Saturday Randi James MD   1 tablet at 07/09/24 2022    tacrolimus (GENERIC) suspension 0.8 mg  0.8 mg Oral or Feeding Tube BID IS Yonny Orona MD   0.8 mg at 07/10/24 0823    traZODone (DESYREL) half-tab 25 mg  25 mg Per Feeding Tube At Bedtime Jailyn Lou MD   25 mg at 07/09/24 2128    valGANciclovir (VALCYTE) solution 450 mg  450 mg Per Feeding Tube Once per day on Tuesday Saturday Randi James MD   450 mg at 07/09/24 2015    Vitamin D3 (CHOLECALCIFEROL) tablet 50 mcg  50 mcg Oral Once per day on Monday Wednesday Friday Velez Reyes, German, MD   50 mcg at 07/10/24 0826     Current Facility-Administered Medications   Medication Dose Route Frequency Provider Last Rate Last Admin    dextrose 10% infusion   Intravenous Continuous PRN Velez Reyes, German, MD   Stopped at 07/03/24 1600    dextrose 10% infusion   Intravenous Continuous PRN Awa Watt MD   Stopped at 07/05/24 1400

## 2024-07-10 NOTE — PROGRESS NOTES
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 I will work to get Kecia scheduled with either Dr. Espinoza or myself in transplant ID clinic in the next ~6-8 weeks to discuss ongoing management of her fungal pneumonia. Ideally, we would have CT chest done prior to this so we can follow her pulmonary progression.     Attestation:  Total duration of visit including chart review, reviewing labs and imaging, interviewing and examining the patient, documentation, and sending communication to the primary treating team, all at the same day of this encounter, is: 52 minutes.       Lissett Lewis MD  Transplant Infectious Diseases Attending  [phone]          Synopsis of Immune Status and Presentation:   Transplant Summary  Transplants:  3/1/2018 (Lung); POD  2314.  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 Found to hae mucous plugs and R-sided bronchial stenosis on BAL (6/19/24). Underwent R sided stent placement with purulent discharge noted. She developed ongoing septic shock and respiratory failure with mucous plugs on BAL.         Active Problems and Infectious Diseases Issues:   Acute hypercapnic and hypoxic respiratory failure  Shock, resolved  Bilateral multilobar pneumonia  R mainstem bronchial stenosis s/p dilation (2/15/24 and 6/20/24) with stent placement   (6/20/24)  BAL cultures growing VSE faecalis, VRE faecium, Stenotrophomonas maltophilia, and C guillermondii   BAL cultures with VSE faecalis, VRE faecium, Stenotrophomonas, and C guillermondi. Note that the Stenotrophomonas has been a long-term colonizer (dates back to 9/2019). The Enterococcus is a pretty uncommon cause of invasive pneumonia, as is the C. Guillermondii.  However, given her shock and hypoxic respiratory failure, we have opted to treat all of these pathogens. There is some thought that her dilation on 6/20/24 may have led to some worsening infection. She is also on a hydrocortisone taper. Completed a 14 day course of minocycline for Stenotrophomonas (Dates: 6/20-7/5). Although it is much less likely to be a cause of invasive pneumonia, we completed a 10-day course of linezolid for possible VRE pneumonia (Dates: 6/25-7/5).            Pyuria  Urine cultures with E coli and VRE faecium (6/19/24)  Completed 7 days antibiotics for E coli (6/21-6/28). VRE faecium treated from 6/21-7/5.            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 Has been on treatment with voriconazole  ([id number])-->posaconazole 2020-current. Now with Aspergillus ocharaceus on BAL with galactomannan of 1.87 (6/19/24). This is an uncommon cause of invasive disease, but have opted to treat given tenuous status and positive BAL galactomannan. Interestingly, repeat BAL on 6/21 with negative galactomannan and no growth. Blood galactomannan (6/24) negative. Susceptible has returned with posaconazole JAYME of 2, which is likely to be susceptible based on minimal available data. She has received 14 days of micafungin (Dates: 6/25-7/9). Will continue posaconazole with goal troughs of >1.2.       C guilliermondii complex from respiratory cultures (6/19, 6/24, 7/3)   Grew on BAL cultures from 6/19 and ET tube cultures from 6/24. Unlikely to be a true pathogen. Initially grew from BAL on 6/19 and again on ET tube cultures from 6/24. Received 14 days of micafungin for dual coverage of Aspergillus (Dates: 6/25-7/9).     CMV Viremia  Low-grade viremia ranging from just detected to 42 international units starting on 5/1/2024. Remains on prophylactic valvanciclovir since 6/24/24, given steroid use. Doing weekly CMV VL. Last CMV VL was 39 (7/2).     Transplant Checklist  - QTc interval: 438 (6/18/24)  - Pneumocystis prophylaxis: Bactrim Single Strength daily for life (h/o PJP)   - Bacterial prophylaxis:  Azithromycin  - Fungal prophylaxis: Posaconazole long-term, given Aspergillus fumigatus empyema.   - Serostatus & viral prophylaxis: CMV D+/R+, EBV D+/R+   - Immunization status:  Needs Spring COVID-19 booster, PCV-20  - Gamma globulin status:   Recent Labs   Lab Test 06/19/24  0839      - Antibiotic allergies: Vancomycin: erythroderma with itching. Will need premedication with Benadryl prior to giving.         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 Decreased to 135k 2/2022. Neg 6/27/22. Increased to 1 million in  11/2023 s/p 1 dose of rituximab and decreasing/undetected in 05/2024. Negative EBV 6/19/24.  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 BAL showed septate hyphae. 7/2020 had hypodense mass in left lung with biopsy showing fungal hyphae, cx aspergillus. Started on voriconazole in 2019, changed to posaconazole in 2020, she remains on 300 mg daily.  1/2021 BAL cx with steno: tx ceftazidime for 2 weeks.  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 Recovery from this required tracheostomy.  2019 - She had also grown Actinomyces from multiple BAL         Interval Events    Since I last saw her on 7/5/24, she has been doing well. Plan is to hold off on initiating higher dose steroids pending [employer]Know results. Aspergillus susceptibilities returned today.          Immunizations:     Immunization History   Administered Date(s) Administered    COVID-19 Bivalent 12+ (Pfizer) 11/18/2022    COVID-19 MONOVALENT 12+ (Pfizer) 03/10/2021, 03/30/2021, 10/12/2021    Hep B, Adult (Heplisav- B) 06/04/2020, 07/04/2020, 08/05/2020, 12/07/2020, 06/15/2022, 07/20/2022, 08/17/2022, 01/18/2023, 07/19/2023, 08/16/2023    HepB, Unspecified 06/04/2020, 07/04/2020, 08/05/2020, 12/07/2020    Hepatitis B, Adult 06/04/2020, 07/04/2020, 08/05/2020, 12/07/2020    Influenza Vaccine 18-64 (Flublok) 10/09/2019    Influenza Vaccine 65+ (Fluzone HD) 02/24/2016    Influenza Vaccine >6 months,quad, PF 10/22/2014, 10/26/2015, 02/24/2016, 11/29/2016, 12/01/2017, 10/29/2018, 10/12/2020, 10/03/2022    Influenza Vaccine IM Ages 6-35 Months 4 Valent (PF) 02/24/2016    Influenza, seasonal, injectable, PF 02/24/2016    Mantoux Tuberculin Skin Test 10/30/2019, 11/07/2019    Pneumo Conj 13-V (2010&after) 02/24/2016    Pneumococcal 23 valent 11/20/2014, 12/18/2019    Pneumococcal, Unspecified 02/24/2016    TD,PF 7+ (Tenivac) 03/11/1996    TDAP (Adacel,Boostrix)  02/04/2008    TDAP Vaccine (Boostrix) 12/13/2018    Td (Adult), Adsorbed 03/11/1996    Tdap (Adult) Unspecified Formulation 02/04/2008    Zoster recombinant adjuvanted (SHINGRIX) 03/06/2019, 06/05/2019            Allergies:     Allergies   Allergen Reactions    Isopropyl Alcohol Other (See Comments)     The alcohol burns the open areas on her skin when used as a skin prep for procedures    Vancomycin Other (See Comments)     Diffuse erythroderma with itching (improved with Benadryl) after receiving vancomycin over 1 hour. 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 Can use vancomycin in the future, but please give over slower infusion time (at least 2 hours) and premedicate with Benadryl.             Medications:   Medications that Require Transfusion:   Current Facility-Administered Medications   Medication Dose Route Frequency Provider Last Rate Last Admin    dextrose 10% infusion   Intravenous Continuous PRN Velez Reyes, German, MD   Stopped at 07/03/24 1600    dextrose 10% infusion   Intravenous Continuous PRN Awa Watt MD   Stopped at 07/05/24 1400     Scheduled Medications:   Current Facility-Administered Medications   Medication Dose Route Frequency Provider Last Rate Last Admin    acetaminophen (TYLENOL) tablet 975 mg  975 mg Oral or Feeding Tube Q8H Jailyn Lou MD   975 mg at 07/09/24 1410    acetylcysteine (MUCOMYST) 10 % nebulizer solution 4 mL  4 mL Inhalation BID Velez Reyes, German, MD   4 mL at 07/09/24 1547    azithromycin (ZITHROMAX) tablet 250 mg  250 mg Oral or Feeding Tube Daily Velez Reyes, German, MD   250 mg at 07/09/24 0828    calcium carbonate (TUMS) chewable tablet 500 mg  500 mg Oral Once per day on Sunday Tuesday Thursday Saturday Josesito Pratt MD   500 mg at 07/09/24 0834    chlorhexidine (PERIDEX) 0.12 % solution 15 mL  15 mL Mouth/Throat Q12H Chio Cortez MD   15 mL at 07/09/24  2015    epoetin alisha-epbx (RETACRIT) injection 4,000 Units  4,000 Units Intravenous Once per day on Monday Wednesday Friday Velez Reyes, German, MD   4,000 Units at 07/09/24 1606    fiber modular (NUTRISOURCE FIBER) packet 1 packet  1 packet Per Feeding Tube Q8H Awa Watt MD   1 packet at 07/09/24 1410    heparin ANTICOAGULANT injection 5,000 Units  5,000 Units Subcutaneous Q8H Jailyn Lou MD   5,000 Units at 07/09/24 1410    insulin aspart (NovoLOG) injection (RAPID ACTING)  1-12 Units Subcutaneous Q4H Edin Davis MD   2 Units at 07/09/24 2015    insulin NPH injection 8 Units  8 Units Subcutaneous BID Velez Reyes, German, MD   8 Units at 07/09/24 2015    levalbuterol (XOPENEX) neb solution 0.63 mg  0.63 mg Nebulization 4x Daily Gareth Mosquera MD   0.63 mg at 07/09/24 1956    [Held by provider] magnesium chloride CR tablet 1,070 mg  1,070 mg Oral Once per day on Sunday Tuesday Thursday Saturday Josesito Pratt MD        magnesium sulfate 1 g in 100 mL D5W intermittent infusion  1 g Intravenous Once Kaitlynn Mcadams MD        metoprolol tartrate (LOPRESSOR) tablet 25 mg  25 mg Oral BID Velez Reyes, German, MD   25 mg at 07/09/24 1819    micafungin (MYCAMINE) 150 mg in sodium chloride 0.9 % 100 mL intermittent infusion  150 mg Intravenous Q24H Chio Cortez  mL/hr at 07/09/24 0010 150 mg at 07/09/24 0010    midodrine (PROAMATINE) tablet 15 mg  15 mg Oral or Feeding Tube Q8H BMT Cyndi Sanon NP   15 mg at 07/09/24 1410    montelukast (SINGULAIR) tablet 10 mg  10 mg Oral At Bedtime Velez Reyes, German, MD   10 mg at 07/08/24 2106    multivitamin RENAL (RENAVITE RX/NEPHROVITE) tablet 1 tablet  1 tablet Oral or Feeding Tube Daily Awa Watt MD   1 tablet at 07/09/24 0828    pantoprazole (PROTONIX) 2 mg/mL suspension 40 mg  40 mg Per Feeding Tube Daily Randi James MD   40 mg at 07/09/24 2015    posaconazole (NOXAFIL) DR tablet TBEC 300 mg  300 mg Per  Feeding Tube Daily Josesito Pratt MD   300 mg at 07/09/24 1416    predniSONE (DELTASONE) tablet 5 mg  5 mg Oral Daily Rossana Sarabia APRN CNP   5 mg at 07/09/24 0828    protein modular (PROSOURCE TF20) packet 1 packet  1 packet Per Feeding Tube Daily Edin Davis MD   1 packet at 07/09/24 0829    QUEtiapine (SEROquel) tablet 50 mg  50 mg Oral or Feeding Tube At Bedtime Kajal Chong APRN CNP   50 mg at 07/08/24 2106    ramelteon (ROZEREM) tablet 8 mg  8 mg Oral At Bedtime Velez Reyes, German, MD   8 mg at 07/09/24 2015    sodium chloride (NEBUSAL) 3 % neb solution 3 mL  3 mL Nebulization BID Velez Reyes, German, MD   3 mL at 07/09/24 1957    sodium chloride (PF) 0.9% PF flush 10 mL  10 mL Intracatheter Q8H Kajal Chong APRN CNP   10 mL at 07/08/24 1612    sodium chloride (PF) 0.9% PF flush 10 mL  10 mL Intracatheter Q8H Kajal Chong APRN CNP   10 mL at 07/09/24 1533    sulfamethoxazole-trimethoprim (BACTRIM) 400-80 MG per tablet 1 tablet  1 tablet Oral or Feeding Tube Once per day on Tuesday Thursday Saturday Randi James MD   1 tablet at 07/09/24 2022    tacrolimus (GENERIC) suspension 0.8 mg  0.8 mg Oral or Feeding Tube BID IS Yonny Orona MD   0.8 mg at 07/09/24 1819    traZODone (DESYREL) half-tab 25 mg  25 mg Per Feeding Tube At Bedtime Jailyn Lou MD   25 mg at 07/08/24 2106    valGANciclovir (VALCYTE) solution 450 mg  450 mg Per Feeding Tube Once per day on Tuesday Saturday Randi James MD   450 mg at 07/09/24 2015    Vitamin D3 (CHOLECALCIFEROL) tablet 50 mcg  50 mcg Oral Once per day on Monday Wednesday Friday Velez Reyes, German, MD   50 mcg at 07/08/24 1009               Physical Exam:   Temp: 98.2  F (36.8  C) Temp src: Oral BP: 105/52 Pulse: 98   Resp: 24 SpO2: 100 % O2 Device: Mechanical Ventilator      Wt Readings from Last 4 Encounters:   07/09/24 53.7 kg (118 lb 6.2 oz)   05/16/24 54.4 kg (120 lb)   03/13/24 55.3 kg (122 lb)   03/05/24 57.7 kg  "(127 lb 4.8 oz)     Constitutional: awake, alert. Trach in place today.   Head, ENT, Eyes, and Neck: Normocephalic, trach in place with some blood n dressing.   Neck supple without rigidity.   Neurologic: Patient is moving all extremities without focal deficit, no focal sensory loss.   Lungs: Breathing comfortably on ventilator through trach today. No increased work of breathing or accessory muscle use.   CVS: Appears well-perfused.            Data:   No results found for: \"ACD4\"    Inflammatory Markers    Recent Labs   Lab Test 06/18/24  1418 10/29/23  0642 10/28/23  0725 10/07/19  1221 10/06/19  1444 09/13/19  0934 09/11/19  2325 08/22/19  0820   SED 10 66* 58* 93*  --  111* 102*  --    CRP  --   --   --   --  25.0* 58.0* 66.0* 47.0*       Immune Globulin Studies     Recent Labs   Lab Test 06/19/24  0839 10/27/23  0701 10/24/23  1033 09/15/21  0915 01/31/21  0441 01/25/21  0406 10/27/19  0621 03/01/18  0356 02/19/18  0759    979  --  1,198 763  --  936 698 1,790*   IGM  --   --   --   --   --   --   --   --  502*   IGE  --   --  <2  --   --  <2  --   --  <2   IGA  --   --   --   --   --   --   --   --  425*   IGG1  --   --   --   --   --   --   --   --  1,300*   IGG2  --   --   --   --   --   --   --   --  131*   IGG3  --   --   --   --   --   --   --   --  101   IGG4  --   --   --   --   --   --   --   --  1*       Metabolic Studies       Recent Labs   Lab Test 07/09/24 2012 07/09/24 2007 07/09/24  1913 07/09/24  1536 07/09/24  1144 07/09/24  0819 07/09/24  0415 07/09/24  0408 07/08/24  0809 07/08/24  0443 07/07/24  0742 07/07/24  0430 07/06/24  1149 07/06/24  1143 07/06/24  0740 07/06/24  0551 07/04/24  0340 07/04/24  0335 06/25/24  0350 06/25/24  0348 06/24/24  1942 06/24/24  1816 06/24/24  0013 06/23/24 2006   NA  --  133*  --   --   --   --   --  129*  --  130*  --  132*  --  131*  --  127*   < > 132*   < > 136  --   --    < >  --    POTASSIUM  --  3.6  --   --   --   --   --  3.4  --  3.5  --  3.5  " --  3.3*  --  3.7   < > 3.6   < > 4.1  --   --    < >  --    CHLORIDE  --  100  --   --   --   --   --  97*  --  97*  --  99  --  97*  --  95*   < > 103   < > 105  --   --    < >  --    CO2  --  25  --   --   --   --   --  21*  --  23  --  24  --  25  --  21*   < > 18*   < > 22  --   --    < >  --    ANIONGAP  --  8  --   --   --   --   --  11  --  10  --  9  --  9  --  11   < > 11   < > 9  --   --    < >  --    BUN  --  30.6*  --   --   --   --   --  80.2*  --  63.0*  --  41.7*  --  26.6*  --  55.5*   < > 67.4*   < > 40.7*  --   --    < >  --    CR  --  1.46*  --   --   --   --   --  3.15*  --  2.62*  --  1.91*  --  1.28*  --  2.34*   < > 2.30*   < > 1.02*  --   --    < >  --    GFRESTIMATED  --  41*  --   --   --   --   --  16*  --  20*  --  29*  --  47*  --  23*   < > 23*   < > 62  --   --    < >  --    * 182* 180* 153* 124* 139*   < > 129*   < > 136*   < > 115*   < > 151*   < > 131*   < > 143*   < > 279*   < >  --    < >  --    A1C  --   --   --   --   --   --   --   --   --   --   --   --   --   --   --   --   --   --   --  5.1  --   --   --   --    CHELSEY  --  8.8  --   --   --   --   --  8.3*  --  8.1*  --  8.0*  --  8.5*  --  8.0*   < > 7.7*   < > 8.0*  --   --    < >  --    PHOS  --   --   --   --   --   --   --  5.3*  --  5.0*  --  3.9  --   --   --  5.1*   < > 7.0*   < > 3.9  --   --    < >  --    MAG  --  1.6*  --   --   --   --   --  1.9  --  1.9  --  1.8  --  1.7  --  2.1   < > 2.6*   < > 2.4*  --   --    < >  --    LACT  --   --   --   --   --   --   --   --   --   --   --   --   --   --   --   --   --   --   --   --   --  0.9  --  0.9   CKT  --   --   --   --   --   --   --   --   --  24*  --   --   --   --   --   --   --  49   < >  --   --   --    < >  --     < > = values in this interval not displayed.       Hepatic Studies    Recent Labs   Lab Test 07/02/24  0359 07/01/24  1637 07/01/24  0346 06/30/24  1527 06/30/24  0332 06/29/24  1604 06/29/24  0359 06/28/24  1646 06/28/24  0422  06/27/24  1636 06/27/24  0348 06/19/24  0613 06/18/24  1803 09/15/21  0915 05/01/21  0654 01/27/21  0414 01/26/21  0425   BILITOTAL 0.6  --  0.6  --  0.8  --  0.8  --  0.7  --  1.1   < > 0.6   < >  --    < > 0.8   ALKPHOS 176*  --  173*  --  178*  --  146  --  122  --  135   < > 235*   < >  --    < > 184*   ALBUMIN 2.8* 2.7* 2.6* 2.7* 2.6* 2.5* 2.5*   < > 2.3*   < > 2.4*   < > 3.6   < >  --    < > 2.0*   AST 26  --  25  --  26  --  22  --  16  --  20   < > 39   < >  --    < > 11   ALT 37  --  36  --  38  --  36  --  32  --  38   < > 39   < >  --    < > 30   LDH  --   --   --   --   --   --   --   --   --   --   --   --   --   --  164  --  187   GGT  --   --   --   --   --   --   --   --   --   --   --   --  147*  --   --   --   --     < > = values in this interval not displayed.       Pancreatitis testing    Recent Labs   Lab Test 06/27/24  1636 06/18/24  1803 10/25/23  1356 05/26/22  0750 09/15/21  0915 01/24/21  0000 02/19/20  0713 12/31/19  1033 10/24/19  2032 10/21/19  1451 10/06/19  1444 09/11/19  2325 03/04/18  0353 02/19/18  0759   AMYLASE  --   --   --   --   --   --   --   --   --   --   --   --   --  58   LIPASE 39 40 24  --   --   --   --   --  212 119 172 127  --   --    TRIG  --   --   --  155* 68 242* 77 98  --   --   --   --  191* 115       Hematology Studies      Recent Labs   Lab Test 07/09/24  0820 07/09/24  0408 07/08/24  0815 07/08/24  0443 07/07/24  0430 07/06/24  0551 06/30/24  0332 06/29/24  0359 06/28/24  0427 06/27/24  0348 06/26/24  1200 06/26/24  0310 06/25/24  0348   WBC 3.1* 3.1* 3.2* 2.6* 3.0* 5.2   < > 4.8 4.4 5.2  --  3.0* 3.0*   ANEU  --   --   --  1.7  --   --   --  4.2 3.3 3.2  --  1.7 1.4*   ALYM  --   --   --  0.8  --   --   --  0.3* 0.8 1.6  --  1.1 1.1   SHERRI  --   --   --  0.2  --   --   --  0.2 0.2 0.4  --  0.2 0.3   AEOS  --   --   --  0.0  --   --   --  0.0 0.0 0.0  --  0.0 0.0   HGB 8.0* 7.8* 6.8* 6.8* 7.1* 8.1*   < > 7.8* 8.0* 8.4*   < > 6.9* 7.0*   HCT 23.3* 22.9* 21.9*  "21.8* 22.0* 25.5*   < > 25.1* 25.6* 27.1*  --  23.1* 23.0*   PLT 48* 45* 56* 50* 52* 75*   < > 111* 98* 112*  --  86* 70*    < > = values in this interval not displayed.       Clotting Studies    Recent Labs   Lab Test 06/18/24  1418 03/05/24  0925 02/14/24  1050 10/24/23  1033 02/19/21  0458 02/12/21  0315 08/04/18  0709 08/03/18  0624 06/19/18  0000 06/11/18  0925 03/12/18  1123 03/05/18  1648   INR 0.98 0.98 0.96 0.99   < >  --    < > 1.09   < > 0.92   < > 1.11   PTT  --   --   --   --   --  28  --  26  --  26  --  24    < > = values in this interval not displayed.       Arterial Blood Gas Testing    Recent Labs   Lab Test 07/07/24  0748 06/30/24  1537 06/26/24  0310 06/25/24  1209 06/24/24  2126 06/24/24  1653   PH  --  7.38 7.35 7.29* 7.33* 7.30*   PCO2  --  41 44 51* 45 50*   PO2  --  154* 137* 60* 133* 80   HCO3  --  24 24 24 24 25   O2PER 30 45 50 50 60 60        Urine Studies     Recent Labs   Lab Test 06/19/24  1214 01/24/21  1729 10/21/19  2240 09/12/19  0125 08/07/19  1512   URINEPH 6.5 5.0 5.0 7.5* 7.0   NITRITE Negative Negative Negative Negative Negative   LEUKEST Large* Moderate* Large* Negative Trace*   WBCU >182* 34* 115* 3 19*       Vancomycin Levels     Recent Labs   Lab Test 06/19/24  0613 10/26/23  0606 09/21/21  1911 01/28/21  0412 01/26/21  0425 01/25/21  1259   VANCOMYCIN 15.6 18.6 18.8 21.6 31.4* 15.1       Tobramycin levels     No lab results found.    Gentamicin levels    No lab results found.    Tacrolimus levels    Invalid input(s): \"TACROLIMUS\", \"TAC\", \"TACR\"      Latest Ref Rng & Units 7/9/2024     8:07 PM 7/9/2024     8:20 AM 7/9/2024     4:08 AM 7/8/2024     8:15 AM 7/8/2024     4:43 AM   Transplant Immunosuppression Labs   Creat 0.51 - 0.95 mg/dL 1.46   3.15   2.62    Urea Nitrogen 8.0 - 23.0 mg/dL 30.6   80.2   63.0    WBC 4.0 - 11.0 10e3/uL  3.1  3.1  3.2  2.6    Neutrophil %  59  59  52  64    ANEU 1.6 - 8.3 10e3/uL     1.7        Cyclosporine levels    Invalid input(s): " "\"CYCLOSPORINE\", \"CYC\"    Mycophenolate levels    Invalid input(s): \"MYPA\", \"MYP\"    Sirolimus levels    Invalid input(s): \"SIROLIMUS\", \"SIR\", \"RAPA\"    CSF testing   No lab results found.      Last check of C difficile  C Diff Toxin B PCR   Date Value Ref Range Status   02/13/2021 Negative NEG^Negative Final     Comment:     Negative: C. difficile target DNA sequences NOT detected, presumed negative   for C.difficile toxin B or the number of bacteria present may be below the   limit of detection for the test.  FDA approved assay performed using [employer] GeneXpert real-time PCR.  A negative result does not exclude actual disease due to C. difficile and may   be due to improper collection, handling and storage of the specimen or the   number of organisms in the specimen is below the detection limit of the assay.       C Difficile Toxin B by PCR   Date Value Ref Range Status   06/22/2024 Negative Negative Final     Comment:     A negative result does not exclude actual disease due to C. difficile and may be due to improper collection, handling and storage of the specimen or the number of organisms in the specimen is below the detection limit of the assay.       Virology:    No results found for: \"H6RES\"    EBV DNA Copies/mL   Date Value Ref Range Status   05/01/2021 38,186 (A) EBVNEG^EBV DNA Not Detected [Copies]/mL Final   02/22/2021 75,709 (A) EBVNEG^EBV DNA Not Detected [Copies]/mL Final   01/25/2021 5,619 (A) EBVNEG^EBV DNA Not Detected [Copies]/mL Final   12/09/2020 10,686 (A) EBVNEG^EBV DNA Not Detected [Copies]/mL Final   09/09/2020 2,935 (A) EBVNEG^EBV DNA Not Detected [Copies]/mL Final   03/09/2020 8,918 (A) EBVNEG^EBV DNA Not Detected [Copies]/mL Final   02/19/2020 38,419 (A) EBVNEG^EBV DNA Not Detected [Copies]/mL Final   12/18/2019 11,933 (A) EBVNEG^EBV DNA Not Detected [Copies]/mL Final   11/11/2019 9,669 (A) EBVNEG^EBV DNA Not Detected [Copies]/mL Final   10/24/2019 13,391 (A) EBVNEG^EBV DNA Not Detected " [Copies]/mL Final   08/01/2018 EBV DNA Not Detected EBVNEG^EBV DNA Not Detected [Copies]/mL Final       CMV Antibody IgG   Date Value Ref Range Status   03/01/2018 Canceled, Test credited 0.0 - 0.8 AI Final     Comment:     Test reordered as correct code  SEE CMV QUANTITATIVE PCR     03/01/2018 >8.0 (H) 0.0 - 0.8 AI Final     Comment:     Positive  Antibody index (AI) values reflect qualitative changes in antibody   concentration that cannot be directly associated with clinical condition or   disease state.     02/19/2018 >8.0 (H) 0.0 - 0.8 AI Final     Comment:     Positive  Antibody index (AI) values reflect qualitative changes in antibody   concentration that cannot be directly associated with clinical condition or   disease state.         Toxoplasma Antibody IgG   Date Value Ref Range Status   02/19/2018 <3.0 0.0 - 7.1 IU/mL Final     Comment:     Negative- Absence of detectable Toxoplasma gondii IgG antibodies. A negative   result does not rule out acute infection.  The magnitude of the measured result is not indicative of the amount of   antibody present. The concentrations of anti-Toxoplasma gondii IgG in a given   specimen determined with assays from different manufacturers can vary due to   differences in assay methods and reagent specificity.         Lissett Lewis MD  [facility]  Contact information available via [facility] Paging/Directory     07/09/2024

## 2024-07-10 NOTE — PROGRESS NOTES
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 I will work to get Kecia scheduled with either Dr. Espinoza or myself in transplant ID clinic in the next ~6-8 weeks to discuss ongoing management of her fungal empyema    Attestation:  Total duration of visit including chart review, reviewing labs and imaging, interviewing and examining the patient, documentation, and sending communication to the primary treating team, all at the same day of this encounter, is: 52 minutes.       Lissett Lewis MD  Transplant Infectious Diseases Attending  [phone]          Synopsis of Immune Status and Presentation:   Transplant Summary  Transplants:  3/1/2018 (Lung); POD  2314.  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 Found to hae mucous plugs and R-sided bronchial stenosis on BAL (6/19/24). Underwent R sided stent placement with purulent discharge noted. She developed ongoing septic shock and respiratory failure with mucous plugs on BAL.         Active Problems and Infectious Diseases Issues:   Acute hypercapnic and hypoxic respiratory failure  Shock, resolved  Bilateral multilobar pneumonia  R mainstem bronchial stenosis s/p dilation (2/15/24 and 6/20/24) with stent placement   (6/20/24)  BAL cultures growing VSE faecalis, VRE faecium, Stenotrophomonas maltophilia, and C guillermondii   BAL cultures with VSE faecalis, VRE faecium, Stenotrophomonas, and C guillermondi. Note that the Stenotrophomonas has been a long-term colonizer (dates back to 9/2019). The Enterococcus is a pretty uncommon cause of invasive pneumonia, as is the C. Guillermondii.  However, given her shock and hypoxic respiratory failure, we have opted to treat all of these pathogens. There is some thought that her dilation on 6/20/24 may have led to some worsening infection. She is also on a hydrocortisone taper. Completed a 14 day course of minocycline for Stenotrophomonas (Dates: 6/20-7/5). Although it is much less likely to be a cause of invasive pneumonia, we completed a 10-day course of linezolid for possible VRE pneumonia (Dates: 6/25-7/5).            Pyuria  Urine cultures with E coli and VRE faecium (6/19/24)  Completed 7 days antibiotics for E coli (6/21-6/28). VRE faecium treated from 6/21-7/5.            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 Has been on treatment with voriconazole (2290-1342)-->posaconazole 2020-current. Now with Aspergillus ocharaceus on BAL with galactomannan of  1.87 (6/19/24). This is an uncommon cause of invasive disease, but have opted to treat given tenuous status and positive BAL galactomannan. Interestingly, repeat BAL on 6/21 with negative galactomannan and no growth. Blood galactomannan (6/24) negative. Susceptible has returned with posaconazole JAYME of 2, which is likely to be susceptible based on minimal available data. She has received 14 days of micafungin (Dates: 6/25-7/9). Will continue posaconazole with goal troughs of >1.2.       C guilliermondii complex from respiratory cultures (6/19, 6/24, 7/3)   Grew on BAL cultures from 6/19 and ET tube cultures from 6/24. Unlikely to be a true pathogen. Initially grew from BAL on 6/19 and again on ET tube cultures from 6/24. Received 14 days of micafungin for dual coverage of Aspergillus (Dates: 6/25-7/9).     CMV Viremia  Low-grade viremia ranging from just detected to 42 international units starting on 5/1/2024. Remains on prophylactic valvanciclovir since 6/24/24, given steroid use. Doing weekly CMV VL. Last CMV VL was 39 (7/2).     Transplant Checklist  - QTc interval: 438 (6/18/24)  - Pneumocystis prophylaxis: Bactrim Single Strength daily for life (h/o PJP)   - Bacterial prophylaxis:  Azithromycin  - Fungal prophylaxis: Posaconazole long-term, given Aspergillus fumigatus empyema.   - Serostatus & viral prophylaxis: CMV D+/R+, EBV D+/R+   - Immunization status:  Needs Spring COVID-19 booster, PCV-20  - Gamma globulin status:   Recent Labs   Lab Test 06/19/24  0839      - Antibiotic allergies: Vancomycin: erythroderma with itching. Will need premedication with Benadryl prior to giving.         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 Decreased to 135k 2/2022. Neg 6/27/22. Increased to 1 million in 11/2023 s/p 1 dose of rituximab and decreasing/undetected in 05/2024. Negative EBV 6/19/24.  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 BAL showed septate hyphae. 7/2020 had hypodense mass in left lung with biopsy showing fungal hyphae, cx aspergillus. Started on voriconazole in 2019, changed to posaconazole in 2020, she remains on 300 mg daily.  1/2021 BAL cx with steno: tx ceftazidime for 2 weeks.  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 Recovery from this required tracheostomy.  2019 - She had also grown Actinomyces from multiple BAL         Interval Events    Since I last saw her on 7/5/24, she has been doing well. Plan is to hold off on initiating higher dose steroids pending [employer] results. Aspergillus susceptibilities returned today.          Immunizations:     Immunization History   Administered Date(s) Administered    COVID-19 Bivalent 12+ (Pfizer) 11/18/2022    COVID-19 MONOVALENT 12+ (Pfizer) 03/10/2021, 03/30/2021, 10/12/2021    Hep B, Adult (Heplisav- B) 06/04/2020, 07/04/2020, 08/05/2020, 12/07/2020, 06/15/2022, 07/20/2022, 08/17/2022, 01/18/2023, 07/19/2023, 08/16/2023    HepB, Unspecified 06/04/2020, 07/04/2020, 08/05/2020, 12/07/2020    Hepatitis B, Adult 06/04/2020, 07/04/2020, 08/05/2020, 12/07/2020    Influenza Vaccine 18-64 (Flublok) 10/09/2019    Influenza Vaccine 65+ (Fluzone HD) 02/24/2016    Influenza Vaccine >6 months,quad, PF 10/22/2014, 10/26/2015, 02/24/2016, 11/29/2016, 12/01/2017, 10/29/2018, 10/12/2020, 10/03/2022    Influenza Vaccine IM Ages 6-35 Months 4 Valent (PF) 02/24/2016    Influenza, seasonal, injectable, PF 02/24/2016    Mantoux Tuberculin Skin Test 10/30/2019, 11/07/2019    Pneumo Conj 13-V (2010&after) 02/24/2016    Pneumococcal 23 valent 11/20/2014, 12/18/2019    Pneumococcal, Unspecified 02/24/2016    TD,PF 7+ (Tenivac) 03/11/1996    TDAP (Adacel,Boostrix) 02/04/2008    TDAP Vaccine (Boostrix) 12/13/2018    Td (Adult), Adsorbed 03/11/1996    Tdap (Adult)  Unspecified Formulation 02/04/2008    Zoster recombinant adjuvanted (SHINGRIX) 03/06/2019, 06/05/2019            Allergies:     Allergies   Allergen Reactions    Isopropyl Alcohol Other (See Comments)     The alcohol burns the open areas on her skin when used as a skin prep for procedures    Vancomycin Other (See Comments)     Diffuse erythroderma with itching (improved with Benadryl) after receiving vancomycin over 1 hour. 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 Can use vancomycin in the future, but please give over slower infusion time (at least 2 hours) and premedicate with Benadryl.             Medications:   Medications that Require Transfusion:   Current Facility-Administered Medications   Medication Dose Route Frequency Provider Last Rate Last Admin    dextrose 10% infusion   Intravenous Continuous PRN Velez Reyes, German, MD   Stopped at 07/03/24 1600    dextrose 10% infusion   Intravenous Continuous PRN Awa Watt MD   Stopped at 07/05/24 1400     Scheduled Medications:   Current Facility-Administered Medications   Medication Dose Route Frequency Provider Last Rate Last Admin    acetaminophen (TYLENOL) tablet 975 mg  975 mg Oral or Feeding Tube Q8H Jailyn Lou MD   975 mg at 07/09/24 1410    acetylcysteine (MUCOMYST) 10 % nebulizer solution 4 mL  4 mL Inhalation BID Velez Reyes, German, MD   4 mL at 07/09/24 1547    azithromycin (ZITHROMAX) tablet 250 mg  250 mg Oral or Feeding Tube Daily Velez Reyes, German, MD   250 mg at 07/09/24 0828    calcium carbonate (TUMS) chewable tablet 500 mg  500 mg Oral Once per day on Sunday Tuesday Thursday Saturday Josesito Pratt MD   500 mg at 07/09/24 0834    chlorhexidine (PERIDEX) 0.12 % solution 15 mL  15 mL Mouth/Throat Q12H Chio Cortez MD   15 mL at 07/09/24 0828    epoetin alisha-epbx (RETACRIT) injection 4,000 Units  4,000 Units Intravenous Once per day on  Monday Wednesday Friday Velez Reyes, German, MD   4,000 Units at 07/09/24 1606    fiber modular (NUTRISOURCE FIBER) packet 1 packet  1 packet Per Feeding Tube Q8H Awa Watt MD   1 packet at 07/09/24 1410    heparin ANTICOAGULANT injection 5,000 Units  5,000 Units Subcutaneous Q8H Jailyn Lou MD   5,000 Units at 07/09/24 1410    insulin aspart (NovoLOG) injection (RAPID ACTING)  1-12 Units Subcutaneous Q4H Edin Davis MD   1 Units at 07/09/24 1543    insulin NPH injection 8 Units  8 Units Subcutaneous BID Velez Reyes, German, MD   8 Units at 07/09/24 0830    levalbuterol (XOPENEX) neb solution 0.63 mg  0.63 mg Nebulization 4x Daily Gareth Mosquera MD   0.63 mg at 07/09/24 1956    [Held by provider] magnesium chloride CR tablet 1,070 mg  1,070 mg Oral Once per day on Sunday Tuesday Thursday Saturday Josesito Pratt MD        metoprolol tartrate (LOPRESSOR) tablet 25 mg  25 mg Oral BID Velez Reyes, German, MD   25 mg at 07/09/24 1819    micafungin (MYCAMINE) 150 mg in sodium chloride 0.9 % 100 mL intermittent infusion  150 mg Intravenous Q24H Chio Cortez  mL/hr at 07/09/24 0010 150 mg at 07/09/24 0010    midodrine (PROAMATINE) tablet 15 mg  15 mg Oral or Feeding Tube Q8H [facility] Cyndi Sanon NP   15 mg at 07/09/24 1410    montelukast (SINGULAIR) tablet 10 mg  10 mg Oral At Bedtime Velez Reyes, German, MD   10 mg at 07/08/24 2106    multivitamin RENAL (RENAVITE RX/NEPHROVITE) tablet 1 tablet  1 tablet Oral or Feeding Tube Daily Awa Watt MD   1 tablet at 07/09/24 0828    pantoprazole (PROTONIX) 2 mg/mL suspension 40 mg  40 mg Per Feeding Tube Daily Randi James MD   40 mg at 07/08/24 2106    posaconazole (NOXAFIL) DR tablet TBEC 300 mg  300 mg Per Feeding Tube Daily Josesito Pratt MD   300 mg at 07/09/24 1416    predniSONE (DELTASONE) tablet 5 mg  5 mg Oral Daily Rossana Sarabia APRN CNP   5 mg at 07/09/24 0828    protein modular (PROSOURCE TF20)  packet 1 packet  1 packet Per Feeding Tube Daily Edin Davis MD   1 packet at 07/09/24 0829    QUEtiapine (SEROquel) tablet 50 mg  50 mg Oral or Feeding Tube At Bedtime Kajal Chong APRN CNP   50 mg at 07/08/24 2106    ramelteon (ROZEREM) tablet 8 mg  8 mg Oral At Bedtime Velez Reyes, German, MD   8 mg at 07/08/24 2106    sodium chloride (NEBUSAL) 3 % neb solution 3 mL  3 mL Nebulization BID Velez Reyes, German, MD   3 mL at 07/09/24 1957    sodium chloride (PF) 0.9% PF flush 10 mL  10 mL Intracatheter Q8H Kajal Chong APRN CNP   10 mL at 07/08/24 1612    sodium chloride (PF) 0.9% PF flush 10 mL  10 mL Intracatheter Q8H Kajal Chong APRN CNP   10 mL at 07/09/24 1533    sulfamethoxazole-trimethoprim (BACTRIM) 400-80 MG per tablet 1 tablet  1 tablet Oral or Feeding Tube Once per day on Tuesday Thursday Saturday Randi James MD   1 tablet at 07/06/24 2126    tacrolimus (GENERIC) suspension 0.8 mg  0.8 mg Oral or Feeding Tube BID IS Yonny Orona MD   0.8 mg at 07/09/24 1819    traZODone (DESYREL) half-tab 25 mg  25 mg Per Feeding Tube At Bedtime Jailyn Lou MD   25 mg at 07/08/24 2106    valGANciclovir (VALCYTE) solution 450 mg  450 mg Per Feeding Tube Once per day on Tuesday Saturday Randi James MD   450 mg at 07/06/24 2112    Vitamin D3 (CHOLECALCIFEROL) tablet 50 mcg  50 mcg Oral Once per day on Monday Wednesday Friday Velez Reyes, German, MD   50 mcg at 07/08/24 1009               Physical Exam:   Temp: 98.2  F (36.8  C) Temp src: Oral BP: 109/63 Pulse: 84   Resp: 24 SpO2: 100 % O2 Device: Mechanical Ventilator      Wt Readings from Last 4 Encounters:   07/09/24 53.7 kg (118 lb 6.2 oz)   05/16/24 54.4 kg (120 lb)   03/13/24 55.3 kg (122 lb)   03/05/24 57.7 kg (127 lb 4.8 oz)     Constitutional: awake, alert. Trach in place today.   Head, ENT, Eyes, and Neck: Normocephalic, trach in place with some blood n dressing.   Neck supple without rigidity.   Neurologic:  "Patient is moving all extremities without focal deficit, no focal sensory loss.   Lungs: Breathing comfortably on ventilator through trach today. No increased work of breathing or accessory muscle use.   CVS: Appears well-perfused.            Data:   No results found for: \"ACD4\"    Inflammatory Markers    Recent Labs   Lab Test 06/18/24  1418 10/29/23  0642 10/28/23  0725 10/07/19  1221 10/06/19  1444 09/13/19  0934 09/11/19  2325 08/22/19  0820   SED 10 66* 58* 93*  --  111* 102*  --    CRP  --   --   --   --  25.0* 58.0* 66.0* 47.0*       Immune Globulin Studies     Recent Labs   Lab Test 06/19/24  0839 10/27/23  0701 10/24/23  1033 09/15/21  0915 01/31/21  0441 01/25/21  0406 10/27/19  0621 03/01/18  0356 02/19/18  0759    979  --  1,198 763  --  936 698 1,790*   IGM  --   --   --   --   --   --   --   --  502*   IGE  --   --  <2  --   --  <2  --   --  <2   IGA  --   --   --   --   --   --   --   --  425*   IGG1  --   --   --   --   --   --   --   --  1,300*   IGG2  --   --   --   --   --   --   --   --  131*   IGG3  --   --   --   --   --   --   --   --  101   IGG4  --   --   --   --   --   --   --   --  1*       Metabolic Studies       Recent Labs   Lab Test 07/09/24  1913 07/09/24  1536 07/09/24  1144 07/09/24  0819 07/09/24  0415 07/09/24  0408 07/08/24  0809 07/08/24  0443 07/07/24  0742 07/07/24  0430 07/06/24  1149 07/06/24  1143 07/06/24  0740 07/06/24  0551 07/05/24  0342 07/05/24  0339 07/04/24  0340 07/04/24  0335 06/25/24  0350 06/25/24  0348 06/24/24  1942 06/24/24  1816 06/24/24  0013 06/23/24 2006   NA  --   --   --   --   --  129*  --  130*  --  132*  --  131*  --  127*  --  131*   < > 132*   < > 136  --   --    < >  --    POTASSIUM  --   --   --   --   --  3.4  --  3.5  --  3.5  --  3.3*  --  3.7  --  3.5   < > 3.6   < > 4.1  --   --    < >  --    CHLORIDE  --   --   --   --   --  97*  --  97*  --  99  --  97*  --  95*  --  100   < > 103   < > 105  --   --    < >  --    CO2  --   --   --  "  --   --  21*  --  23  --  24  --  25  --  21*  --  23   < > 18*   < > 22  --   --    < >  --    ANIONGAP  --   --   --   --   --  11  --  10  --  9  --  9  --  11  --  8   < > 11   < > 9  --   --    < >  --    BUN  --   --   --   --   --  80.2*  --  63.0*  --  41.7*  --  26.6*  --  55.5*  --  34.9*   < > 67.4*   < > 40.7*  --   --    < >  --    CR  --   --   --   --   --  3.15*  --  2.62*  --  1.91*  --  1.28*  --  2.34*  --  1.57*   < > 2.30*   < > 1.02*  --   --    < >  --    GFRESTIMATED  --   --   --   --   --  16*  --  20*  --  29*  --  47*  --  23*  --  37*   < > 23*   < > 62  --   --    < >  --    * 153* 124* 139* 130* 129*   < > 136*   < > 115*   < > 151*   < > 131*   < > 88   < > 143*   < > 279*   < >  --    < >  --    A1C  --   --   --   --   --   --   --   --   --   --   --   --   --   --   --   --   --   --   --  5.1  --   --   --   --    CHELSEY  --   --   --   --   --  8.3*  --  8.1*  --  8.0*  --  8.5*  --  8.0*  --  7.8*   < > 7.7*   < > 8.0*  --   --    < >  --    PHOS  --   --   --   --   --  5.3*  --  5.0*  --  3.9  --   --   --  5.1*  --  3.4  --  7.0*   < > 3.9  --   --    < >  --    MAG  --   --   --   --   --  1.9  --  1.9  --  1.8  --  1.7  --  2.1  --  2.0   < > 2.6*   < > 2.4*  --   --    < >  --    LACT  --   --   --   --   --   --   --   --   --   --   --   --   --   --   --   --   --   --   --   --   --  0.9  --  0.9   CKT  --   --   --   --   --   --   --  24*  --   --   --   --   --   --   --   --   --  49   < >  --   --   --    < >  --     < > = values in this interval not displayed.       Hepatic Studies    Recent Labs   Lab Test 07/02/24  0359 07/01/24  1637 07/01/24  0346 06/30/24  1527 06/30/24  0332 06/29/24  1604 06/29/24  0359 06/28/24  1646 06/28/24  0427 06/27/24  1636 06/27/24  0348 06/19/24  0613 06/18/24  1803 09/15/21  0915 05/01/21  0654 01/27/21  0414 01/26/21  0425   BILITOTAL 0.6  --  0.6  --  0.8  --  0.8  --  0.7  --  1.1   < > 0.6   < >  --    < > 0.8   ALKPHOS  176*  --  173*  --  178*  --  146  --  122  --  135   < > 235*   < >  --    < > 184*   ALBUMIN 2.8* 2.7* 2.6* 2.7* 2.6* 2.5* 2.5*   < > 2.3*   < > 2.4*   < > 3.6   < >  --    < > 2.0*   AST 26  --  25  --  26  --  22  --  16  --  20   < > 39   < >  --    < > 11   ALT 37  --  36  --  38  --  36  --  32  --  38   < > 39   < >  --    < > 30   LDH  --   --   --   --   --   --   --   --   --   --   --   --   --   --  164  --  187   GGT  --   --   --   --   --   --   --   --   --   --   --   --  147*  --   --   --   --     < > = values in this interval not displayed.       Pancreatitis testing    Recent Labs   Lab Test 06/27/24  1636 06/18/24  1803 10/25/23  1356 05/26/22  0750 09/15/21  0915 01/24/21  0000 02/19/20  0713 12/31/19  1033 10/24/19  2032 10/21/19  1451 10/06/19  1444 09/11/19  2325 03/04/18  0353 02/19/18  0759   AMYLASE  --   --   --   --   --   --   --   --   --   --   --   --   --  58   LIPASE 39 40 24  --   --   --   --   --  212 119 172 127  --   --    TRIG  --   --   --  155* 68 242* 77 98  --   --   --   --  191* 115       Hematology Studies      Recent Labs   Lab Test 07/09/24  0820 07/09/24  0408 07/08/24  0815 07/08/24  0443 07/07/24  0430 07/06/24  0551 06/30/24  0332 06/29/24  0359 06/28/24  0427 06/27/24  0348 06/26/24  1200 06/26/24  0310 06/25/24  0348   WBC 3.1* 3.1* 3.2* 2.6* 3.0* 5.2   < > 4.8 4.4 5.2  --  3.0* 3.0*   ANEU  --   --   --  1.7  --   --   --  4.2 3.3 3.2  --  1.7 1.4*   ALYM  --   --   --  0.8  --   --   --  0.3* 0.8 1.6  --  1.1 1.1   SHERRI  --   --   --  0.2  --   --   --  0.2 0.2 0.4  --  0.2 0.3   AEOS  --   --   --  0.0  --   --   --  0.0 0.0 0.0  --  0.0 0.0   HGB 8.0* 7.8* 6.8* 6.8* 7.1* 8.1*   < > 7.8* 8.0* 8.4*   < > 6.9* 7.0*   HCT 23.3* 22.9* 21.9* 21.8* 22.0* 25.5*   < > 25.1* 25.6* 27.1*  --  23.1* 23.0*   PLT 48* 45* 56* 50* 52* 75*   < > 111* 98* 112*  --  86* 70*    < > = values in this interval not displayed.       Clotting Studies    Recent Labs   Lab Test  "06/18/24  1418 03/05/24  0925 02/14/24  1050 10/24/23  1033 02/19/21  0458 02/12/21  0315 08/04/18  0709 08/03/18  0624 06/19/18  0000 06/11/18  0925 03/12/18  1123 03/05/18  1648   INR 0.98 0.98 0.96 0.99   < >  --    < > 1.09   < > 0.92   < > 1.11   PTT  --   --   --   --   --  28  --  26  --  26  -- 24    < > = values in this interval not displayed.       Arterial Blood Gas Testing    Recent Labs   Lab Test 07/07/24  0748 06/30/24  1537 06/26/24  0310 06/25/24  1209 06/24/24  2126 06/24/24  1653   PH  --  7.38 7.35 7.29* 7.33* 7.30*   PCO2  --  41 44 51* 45 50*   PO2  --  154* 137* 60* 133* 80   HCO3  --  24 24 24 24 25   O2PER 30 45 50 50 60 60        Urine Studies     Recent Labs   Lab Test 06/19/24  1214 01/24/21  1729 10/21/19  2240 09/12/19  0125 08/07/19  1512   URINEPH 6.5 5.0 5.0 7.5* 7.0   NITRITE Negative Negative Negative Negative Negative   LEUKEST Large* Moderate* Large* Negative Trace*   WBCU >182* 34* 115* 3 19*       Vancomycin Levels     Recent Labs   Lab Test 06/19/24  0613 10/26/23  0606 09/21/21  1911 01/28/21  0412 01/26/21  0425 01/25/21  1259   VANCOMYCIN 15.6 18.6 18.8 21.6 31.4* 15.1       Tobramycin levels     No lab results found.    Gentamicin levels    No lab results found.    Tacrolimus levels    Invalid input(s): \"TACROLIMUS\", \"TAC\", \"TACR\"      Latest Ref Rng & Units 7/9/2024     8:20 AM 7/9/2024     4:08 AM 7/8/2024     8:15 AM 7/8/2024     4:43 AM 7/7/2024     4:30 AM   Transplant Immunosuppression Labs   Creat 0.51 - 0.95 mg/dL  3.15   2.62  1.91    Urea Nitrogen 8.0 - 23.0 mg/dL  80.2   63.0  41.7    WBC 4.0 - 11.0 10e3/uL 3.1  3.1  3.2  2.6  3.0    Neutrophil % 59  59  52  64  56    ANEU 1.6 - 8.3 10e3/uL    1.7         Cyclosporine levels    Invalid input(s): \"CYCLOSPORINE\", \"CYC\"    Mycophenolate levels    Invalid input(s): \"MYPA\", \"MYP\"    Sirolimus levels    Invalid input(s): \"SIROLIMUS\", \"SIR\", \"RAPA\"    CSF testing   No lab results found.      Last check of C " "difficile  C Diff Toxin B PCR   Date Value Ref Range Status   02/13/2021 Negative NEG^Negative Final     Comment:     Negative: C. difficile target DNA sequences NOT detected, presumed negative   for C.difficile toxin B or the number of bacteria present may be below the   limit of detection for the test.  FDA approved assay performed using [employer] real-time PCR.  A negative result does not exclude actual disease due to C. difficile and may   be due to improper collection, handling and storage of the specimen or the   number of organisms in the specimen is below the detection limit of the assay.       C Difficile Toxin B by PCR   Date Value Ref Range Status   06/22/2024 Negative Negative Final     Comment:     A negative result does not exclude actual disease due to C. difficile and may be due to improper collection, handling and storage of the specimen or the number of organisms in the specimen is below the detection limit of the assay.       Virology:    No results found for: \"H6RES\"    EBV DNA Copies/mL   Date Value Ref Range Status   05/01/2021 38,186 (A) EBVNEG^EBV DNA Not Detected [Copies]/mL Final   02/22/2021 75,709 (A) EBVNEG^EBV DNA Not Detected [Copies]/mL Final   01/25/2021 5,619 (A) EBVNEG^EBV DNA Not Detected [Copies]/mL Final   12/09/2020 10,686 (A) EBVNEG^EBV DNA Not Detected [Copies]/mL Final   09/09/2020 2,935 (A) EBVNEG^EBV DNA Not Detected [Copies]/mL Final   03/09/2020 8,918 (A) EBVNEG^EBV DNA Not Detected [Copies]/mL Final   02/19/2020 38,419 (A) EBVNEG^EBV DNA Not Detected [Copies]/mL Final   12/18/2019 11,933 (A) EBVNEG^EBV DNA Not Detected [Copies]/mL Final   11/11/2019 9,669 (A) EBVNEG^EBV DNA Not Detected [Copies]/mL Final   10/24/2019 13,391 (A) EBVNEG^EBV DNA Not Detected [Copies]/mL Final   08/01/2018 EBV DNA Not Detected EBVNEG^EBV DNA Not Detected [Copies]/mL Final       CMV Antibody IgG   Date Value Ref Range Status   03/01/2018 Canceled, Test credited 0.0 - 0.8 AI Final     " Comment:     Test reordered as correct code  SEE CMV QUANTITATIVE PCR     03/01/2018 >8.0 (H) 0.0 - 0.8 AI Final     Comment:     Positive  Antibody index (AI) values reflect qualitative changes in antibody   concentration that cannot be directly associated with clinical condition or   disease state.     02/19/2018 >8.0 (H) 0.0 - 0.8 AI Final     Comment:     Positive  Antibody index (AI) values reflect qualitative changes in antibody   concentration that cannot be directly associated with clinical condition or   disease state.         Toxoplasma Antibody IgG   Date Value Ref Range Status   02/19/2018 <3.0 0.0 - 7.1 IU/mL Final     Comment:     Negative- Absence of detectable Toxoplasma gondii IgG antibodies. A negative   result does not rule out acute infection.  The magnitude of the measured result is not indicative of the amount of   antibody present. The concentrations of anti-Toxoplasma gondii IgG in a given   specimen determined with assays from different manufacturers can vary due to   differences in assay methods and reagent specificity.         Lissett Lewis MD  [facility]  Contact information available via [facility] Paging/Directory     07/09/2024

## 2024-07-10 NOTE — PROGRESS NOTES
Shift Summary:    Pt remained trached  with #6 Shiley cuffed and mechanically ventilated on the following vent settings:    Vent Mode: CMV/AC  (Continuous Mandatory Ventilation/ Assist Control)  FiO2 (%): 30 %  Resp Rate (Set): 18 breaths/min  Tidal Volume (Set, mL): 320 mL  PEEP (cm H2O): 5 cmH2O      PST:  Pt able to PS twice today at 10/+5 30%. In the AM pt able to last 38 min on PS before stating she had increased SOB and RR 38. This afternoon pt able to last 28 min before again stating she had increased SOB and RR 42.     Assessment: BS crackles, coarse bilaterally. RT suctioned scant of thick cloudy secretions.    Therapy: Pt tolerated nebs with no issues .    Ambu bag, mask, and valve present at bedside. Emergency trach supplies also present at bedside.    RT will continue to follow and monitor pt as appropriate.     Darby Driscoll, RT on 7/10/2024 at 5:41 PM

## 2024-07-10 NOTE — PLAN OF CARE
ICU End of Shift Summary. See flowsheets for vital signs and detailed assessment.     Changes this shift: Do not disturb [id number]. Appears to have slept well. CMV 30%. Episode of SR with BBB and fusion complexes at start of shift, EKG completed and 1g magnesium given. Converted back to SR around 2200. MAPs > 65. Loose BM x2. Per MICU, ok to hold off pulling trialysis line d/t difficulty initiating HD last evening. Potassium replaced x1.      Plan: Continue with plan of care. HD today.

## 2024-07-10 NOTE — PROGRESS NOTES
HEMODIALYSIS TREATMENT NOTE    Date: 7/9/2024  Time: 7:21 PM    Data:  Modality: bed   Pre Wt: 53.7 kg (118 lb 6.2 oz)   Desired Wt:   50.7kg   Post Wt: 51.8  Weight change: 1.9kg  Ultrafiltration - Post Run Net Total Removed (mL): 1900 mL  Vascular Access Site: patent   Dialyzer Rinse: Streaked  Total Blood Volume Processed: 78.2 L Liters  Total Dialysis (Treatment) Time: 3 Hours  Dialysate Bath: K 4, Ca 3  Heparin: Heparin: None  Avf used 15g x 2 successfully   Bfr 300 dfr 600    Lab:   No    Interventions:  Tx of 3.0 hr and goal 2-3L uf removal. Last 45 minutes of tx pt bp dropped- uf goal reduced to 2.0L. 30 minutes till termination of tx EKG changes- on call nephrology paged and stat EKG ordered- resulted normal sinus rhythm. Tx cont till end w uf off. 100mL NS admin when EKG change occurred. NET removal 1.9L. Minimal alarming of iHD machine and  . B/t+. Hemostasis obtained within 15 minutes and bandages changed; 25 minutes of bleeding from arterial site. Quickclot used . Epo admin ivp- see mar per time and dose.     Assessment:  A/O x 4, calm & cooperative, denies pain.  Access site intact, previous dressing clean and dry     Plan:    Per Renal team

## 2024-07-10 NOTE — PROGRESS NOTES
MEDICAL ICU PROGRESS NOTE  07/10/2024      Date of Service (when I saw the patient): 07/10/2024    ASSESSMENT: Kecia Blue is a 63 year old female with PMH ILD s/p bilateral lung transplant (2018) c/b recurrent right bronchial stenosis s/p repeat balloon dilation/stenting, repeat opportunistic pneumonia (PJP 2021, aspergillus/stenotrophomonas 11/2023) and viremias (EBV, CMV), and ESRD 2/2 tacrolimus toxicity on HD who was admitted on 6/18/2024 for acute hypercapnic respiratory failure 2/2 tracheal stenosis and pneumonia.  Status post airway stent placement by IP on 6/20. Hospital course complicated by hypotension, delirium, and prolonged respiratory failure.    Changes today:  - Remove temporary dialysis catheter today per nephrology  - No HD today, HD tomorrow per nephrology  - Decrease to midodrine 10 mg TID, discontinue prn midodrine  - Health psychology consulted for anxiety surrounding SBTs  - Continue BID SBT trials on pressure support 10/5 as tolerated  - Hopeful to discharge to LTACH this week    PLAN:    Neuro:  #Pain   - Switch Acetaminophen scheduling to 1 gram q8h to minimize nightly disturbances    #Toxic metabolic encephalopathy, improving  Ongoing lethargy in setting of sepsis and delirium in the setting of high-dose steroid therapy. Patient reports improved sleep on current medications. Following commands this morning.   - Quetiapine 50 mg at bedtime   - Ramalteon and trazodone 25mg for sleep - change medications timing   - delirium precautions  - Do not disturb order overnight to promote sleep    #Anxiety  Patient has been having anxiety regarding pressure support trials and weaning from the ventilator.  and patient would like to talk to somebody while in the hospital. Will plan to consult Health Psychology tomorrow (as not here on weekends).   - Health Psychology consulted    Pulmonary:  #Acute on chronic hypoxic hypercapnic respiratory failure, improving  #HAP, Stenotrophomonas  maltophilia, Enterococcus faecalis, and Candida guilliermondi complex  #Severe pulmonary edema  #Acute respiratory distress syndrome, resolving  Presented to the ED on 6/18/2024 with acute on chronic hypoxic hypercapnic respiratory failure. Transferred to MICU and intubated on overnight on 6/18. Workup significant for Stenotrophomonas, Enterococcus, and Candida pneumonia. Course was c/b progression to ARDS (6/21-22) with elevated plateau pressures. Pt remain intubated d/t ongoing pulmonary edema iso ESRD on iHD. CXR (6/26) with similar pulmonary opacities compared to 6/24, left > right.  The etiology of this groundglass opacity was unclear but ddx includes rejection vs. infection vs. volume overload. Ongoing course breath sounds with some expiratory wheezes on exam.  - Antibiotics, as below  - Volume management with iHD  - Mechanical ventilation, wean as able  - Continue Daily SBT (AM and PM) on pressure support 10/5- tolerated 60 minutes yesterday am and 90 minutes in pm; RR in high 30s-40  - s/p tracheostomy 7/4-> ENT removed sutures and perform trach dressing change 7/8  - Pulmonary toilet  - Mucomyst BID  - Hypertonic saline BID, alternate with mucomyst  - Albuterol neb QID  - Repeat CXR this morning- stable bilateral opacities  - Last VBG- (7/7)- pH 7.35, pCO2 45, pO2 90.7 on 30% FiO2, p/f ratio 302  - CT chest w/o contrast 7/8: Waxing and waning mostly improved right lung consolidation now with diffuse groundglass organizing opacities mostly in the lower lobe an middle lobe more than the upper lobe. Slight improvement in the peribronchiolar consolidation in the left lower lobe but there is increased groundglass organization in the left upper lobe.       Vent Mode: CMV/AC  (Continuous Mandatory Ventilation/ Assist Control)  FiO2 (%): 30 %  Resp Rate (Set): 18 breaths/min  Tidal Volume (Set, mL): 320 mL  PEEP (cm H2O): 5 cmH2O  Pressure Support (cm H2O): 10 cmH2O  Resp: 22      #Recurrent right middle bronchus  stenosis s/p dilation and stent (6/20/2024)  Has history (bronchoscopy 2/15/24) demonstrating narrowing of right mainstem transplant anastomosis and bronchus intermedius. CT chest (6/18/2024) and bronchoscopy (6/19/2024) demonstrated occlusion of right middle bronchus. With concern for post-obstructive pneumonia, interventional pulmonology was consulted, and completed bronchoscopy (6/20/2024) with tissue debulking, right middle bronchus balloon dilation to 11 mm, stent placement in right main bronchus, and bifurcating cast placement in right upper bronchus. Plan for saline nebs BID and outpatient bronchoscopy in 6 weeks.   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  - Transplant pulm consulted and following  - Prospera (7/3) pending  - DSA (7/3) pending  - Tacrolimus level therapeutic, no dose adjustment, repeat level ordered 7/12   - PTA nebs  - Fluticasone-viilanterol (breo ellipta) every day - HOLD with respiratory infection  - Montelukast every day   - Immunosuppressants per Tx Pulm:   - PTA Tacrolimus (dosing per tx pulm)  - PTA Prednisone 5 mg daily   - PTA azathioprine - HOLD per Transplant pulmonology with repeat infections  - Peripheral smear (7/9) pending for pancytopenia  - Antibiotic prophylaxis   - Bactrim MWF for PJP ppx (monitor need to adjust pending HD plan)  - CMV weekly monitoring (qTuesday)  - Azithromycin per pulmonary   - Continue VGCV ppx (renally dosed, monitor need to adjust pending HD plan) with tentative plan for 3 weeks beyond completion of stress dose steroids   - CT chest w/o contrast 7/8 [er above  - Per transplant pulm, not starting steroids at this time    Cardiovascular:  #Septic shock, improving  On  6/19/2024, developed sepsis (hypotension 80s/50s, tachypnea 24) in the setting of stenotrophomonas pneumonia/enterococcus faecium VRE UTI. Etiology of shock likely distributive iso sepsis; less likely hypovolemic (not pulling volumes with CRRT), medication-associated (weaned off of propofol gtt) or obstructive (low suspicion for PE, echo 6/19 without evidence of cardiac dysfunction).   Pressor requirements slowly improving but are sluggish, driven by low diastolic pressure. Has been having transient episodes of MAPs <65 and into 50s overnight. Attempted to change timing of metoprolol to see if that improves patient's blood pressures, but could just be consistent with when she is sleeping. Episodes self-resolving by next blood pressure check.   - continue PTA prednisone 5 mg daily    Past course:  - Hydrocortisone 50mg q6H (6/21-6/26); 50 mg BID (6/26-6/28); 25 mg BID (6/28-7/3), 25 mg daily (7/3-7/5)  - Fludrocortisone 0.1 mg qday (6/21-6/28); 0.05 mg qday (6/28-30)  - MAPs >65 overnight, no drops  - Decrease to midodrine 10 mg TID, discontinue prn midodrine      #Demand Ischemia, resolved    Presented with elevated troponin (peak 499). Not associated with chest pain or hypoxia. EKG without ST elevations/depressions or T wave inversions. Suspect demand ischemia in the setting of sepsis. Will continue to monitor symptoms and continuous telemetry. EKG 7/2 sinus rhythm with PACs.     #Paroxysmal A-Fib, improved  #Pulmonary Hypertension   #Transient arhythmia with palpitations  History of pulmonary hypertension (45 mmHg, echo 10/2023) in the setting of ILD and paroxysmal afib on PTA metoprolol tartrate BID. Not on anticoagulation for afib. Transient unspecified arhythmia overnight (7/9) with palpitations. EKG 7/9 sinus rhythm with fusion complexes and known RBBB. Repeat EKG 7/10 showing sinus rhythm with no RBBB or fusion complexes. Patient given magnesium 1g and 20mEq K+ for K 3.3 this am. No further episodes of  palpitations. Currently in sinus rhythm.  - continue PTA metoprolol tartrate 25mg BID  - CTM      GI/Nutrition:  #Nutrition  PEG-J placed by IR, continue tube feeds.   - Nutrition consult  - Tube feeds 30ml/hr; at goal    #Diarrhea  On 6/21, had 8 bowel movements. Occurred in the setting of scheduled bowel regimen and starting new antibiotics (meropenem, levofloxacin). C diff negative. Ongoing diarrhea x 2 days now.  - Holding PRN bowel regimen  - Miralax PRN   - Senna prn  - Trial of Nutrisource Fiber and monitor-3 packets daily per nutrition recs  - Stool enteric bacteria and viral PCR panel  - Consider loperamide if stool pathogen testing negative      #Cholelithiasis with gallbladder wall thickening, resolved  During admission patient found to have up trending LFTs and fever in setting of GB wall thickening on CT abdomen/pelvis, now resolved. MRCP completed showing borderline dilated CBD up to 1.1 cm, no significant intrahepatic biliary dilation, no evidence of choledocholithiasis, no evidence of acute cholecystitis.    Renal/Fluids/Electrolytes:  #ESRD on iHD (M,W,F)  History of ESRD 2/2 Tacrolimus toxicity on HD (MWF). With soft blood pressures, placed RIJ (6/20/2024) and started CRRT (6/20/2024). US of AVF 7/5: arterial inflow patent without inflow stenosis, normal diameter of anastamosis, venous outflow elevated velocity at subclavian- suggestive of recurrent stenosis in venous outflow (area of prior angioplasty in March). IR consulted who noted that IR fistulogram not required at that time, and was able to run iHD on 7/6 off her fistula.   - Nephrology consulted and following for ESRD  - will follow recommendations for need of HD  - Renally-dosed medications  - BMP daily  - mild hypernatremia but stable  - Fistulogram and subclavian angioplasty yesterday with IR  - Net +1.7L yesterday  - HD yesterday- 25 minutes of post tx bleeding post treatment per RN, nephrology aware  - Next HD 7/11 per nephrology  -  Remove temporary HD line today per nephrology      Endocrine:  #Risk for hyperglycemia with stress-dose steroids, resolved  Stress dose steroids discontinued, resume PTA prednisone 5 mg daily.  - NPH 8U BID  -CTM     ID:  #HAP, Stenotrophomonas maltophilia, Enterococcus faecalis, Aspergillus, and Candida guilliermondi complex  Presented to the ED on 6/18/2024 with SOB and hypercapnic respiratory failure. Found on bronchoscopy (6/19) to have RML obstruction by narrowing bronchus intermedius as well as copious mucopurulent secretions/mucus plugging. Previously treated for Stenotrophonomas/aspergillus pneumonia in 11/2023 with subsequent sputum culture (2/2024) negative for both Stenotrophonomas/Aspergillus. Etiology likely repeat Stenotrophomonas infection vs opportunistic infection from colonized microbe.   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    - Continue posaconazole 300 mg/day    - Complete 14 days of micafungin 150 mg/day today (6/25-7/9)- discontinued   - Continue VGCV prophylaxis  - Weekly CMV VL (Tuesdays, next 7/9), last was 39 (7/2)    - Azithromycin per pulmonary   - TMP/SMX SS daily for PJP PPx (for life given h/o PJP)  - Awaiting susceptibilities on Aspergillus per transplant ID  - Present antibiotics   - Continue posaconazole 300 mg/day per pulm transplant (6/25 to present) for candida guillermondii and asergillus ochraceus on prior BAL  - Past antibiotics   - s/p micafungin  150 mg/day (6/25-7/9)  - s/p IV minocycline 100mg BD x 14 days total (6/20-7/5)- for stenotrophamonas  - PO linezolid 600 mg BID x 10 days (6/25-7/5)- for VRE in urine and BAL cultures  - S/p ceftazidime (6/25-6/28)    - S/p vancomycin (6/18-6/20)   - S/p zosyn (6/18-21)  - S/p Daptomycin (6/21-6/23)  - S/p Meropenem (6/21-6/24)  - S/p Levofloxacin (6/21-6/23)    - BAL fluid (7/3): pink, hazy, cell counts normal range, fluid sent for viral, bacterial and fungal cultures negative to date, cytology negative for malignancy and organisms; preliminary AFB negative      #Pyuria, Enterococcus faecium VRE and  ESBL E. Coli   Asymptomatic. With progressive IV pressor needs, obtained broad infectious workup which demonstrated UCx (6/19/2024) with Enterococcus faecium VR and ESBL E. Coli. S/p Daptomycin (6/21-6/23) and Meropenem (6/21-6/24).    #Hx Aspergillus PNA (11/2023)  #Hx PJP PNA (1/2021)  #Hx CMV viremia, recurrent  #Hx EBV viremia  - Transplant ID consult, appreciate recs  PTA posaconazole (Treatment for hx of aspergillus PNA)  PTA azithromycin 250mg qd (CLAD ppx)  PTA bactrim (PJP ppx)  -CMV PVR 7/9- negative     Hematology:    #Acute on Chronic Normocytic Anemia, stable  #Thrombocytopenia, chronic  History of chronic anemia in the setting of kidney disease (baseline Hgb 10-11). Upon admission, Hgb 9. May be bone marrow suppression in the setting of chronic illness; potential contribution by blood loss with CRRT filter changes (~150-200c). Peripheral smear (6/18/2024) without evidence of schistocytes. Hemoglobin low today at 7.1, no signs of active bleeding. Additionally, platelets low at 52 today.   - Continue to monitor CBC in AM  - monitor if need to hold DVT prophylaxis in the AM  - Per nephrology, 4000 units epoetin daily  - Hgb improved this morning (7.8) after transfusion 1 unit pRBC yesterday  - PLTs downtrending  but stable (47k today), no signs of bleeding     Musculoskeletal:  - PT / OT consult      Skin:  - Sacral Blanching- off load with frequent turns in bed    General Cares/Prophylaxis:    DVT Prophylaxis: subcutaneous heparin    GI Prophylaxis: PPI  Restraints: None  Family Communication:  to be updated by phone  Code Status: Full Code     Lines/tubes/drains:  - PIV x2, midline  - RIJ- remove today per nephrology  - Tracheostomy  - PEG/J    Disposition:  - Medical ICU- stay for PS   - Patient medically ready for discharge to LTAC, will follow up with care coordinator regarding bed availability.    Patient seen and findings/plan discussed with medical ICU staff, Dr. Orozco.    Edin Davis MD  Internal Medicine Resident, PGY-1    Clinically Significant Risk Factors        # Hypokalemia: Lowest K = 3.3 mmol/L in last 2 days, will replace as needed  # Hyponatremia: Lowest Na = 129 mmol/L in last 2 days, will monitor as appropriate    # Hypomagnesemia: Lowest Mg = 1.6 mg/dL in last 2 days, will replace as needed   # Hypoalbuminemia: Lowest albumin = 2.2 g/dL at 6/21/2024  4:26 PM, will monitor as appropriate   # Thrombocytopenia: Lowest platelets = 45 in last 2 days, will monitor for bleeding                 #Precipitous drop in Hgb/Hct: Lowest Hgb this hospitalization: 6.8 g/dL. Will continue to monitor and treat/transfuse as appropriate.      # Moderate Malnutrition: based on nutrition assessment    # Financial/Environmental Concerns: none                ====================================  INTERVAL HISTORY:   Patient with episode of unspecified tachyrhythmia overnight with reported palpitations. EKG performed showing progression of prior RBBB to complete but otherwise sinus rhythm. Patient denied further episodes and denied acute SOB or syncopal symptoms during palpitations. Patient given magnesium 1g and 20mEq K+ for K 3.3 this am. Currently in sinus rhythm. Received HD yesterday through AVF but temporary HD line not pulled due to RN concerns of difficulties initiating dialysis. Patient  reports she slept well overnight with do not disturb order in place. She denies pain. Tolerated 20 minutes of SBT on 10/5 yesterday x 3 2 and 38 minutes this morning without  at bedside.    OBJECTIVE:   1. VITAL SIGNS:   Temp:  [97.1  F (36.2  C)-98.4  F (36.9  C)] 97.1  F (36.2  C)  Pulse:  [] 83  Resp:  [18-26] 22  BP: ()/(51-75) 117/60  Cuff Mean (mmHg):  [79-87] 83  FiO2 (%):  [30 %] 30 %  SpO2:  [99 %-100 %] 100 %  Vent Mode: CMV/AC  (Continuous Mandatory Ventilation/ Assist Control)  FiO2 (%): 30 %  Resp Rate (Set): 18 breaths/min  Tidal Volume (Set, mL): 320 mL  PEEP (cm H2O): 5 cmH2O  Pressure Support (cm H2O): 10 cmH2O  Resp: 22    2. INTAKE/ OUTPUT:   I/O last 3 completed shifts:  In: 1630 [I.V.:275; NG/GT:695]  Out: 2150 [Other:1900; Stool:250]    3. PHYSICAL EXAMINATION:  General: Sitting up in bed, NAD   HEENT: Atraumatic, moist mucous membranes   Pulm/Resp: Ongoing coarse breath sounds, unchanged from prior exam. Stable expiratory wheezing. Good air movement throughout.   CV: RRR, no m/r/g   Abdomen: Soft, non-distended, non-tender, bowel sounds present.  Ext: Trace bilateral LE edema, pulses 2+ radial, pedal  Incisions/Skin: No rashes or lesions, trach in place with oozing on gauze, no signs of bleeding  Neuro: Awake and alert, follows 1-step and 2-step commands and nods and shakes head to questions, moving all extremities equally    4. LABS:   Arterial Blood Gases   No lab results found in last 7 days.    Complete Blood Count   Recent Labs   Lab 07/10/24  0416 07/09/24  0820 07/09/24  0408 07/08/24  0815   WBC 2.2* 3.1* 3.1* 3.2*   HGB 7.5* 8.0* 7.8* 6.8*   PLT 47* 48* 45* 56*     Basic Metabolic Panel  Recent Labs   Lab 07/10/24  0418 07/10/24  0416 07/10/24  0029 07/09/24 2012 07/09/24 2007 07/09/24 0415 07/09/24 0408 07/08/24  0809 07/08/24  0443   NA  --  130*  --   --  133*  --  129*  --  130*   POTASSIUM  --  3.3*  --   --  3.6  --  3.4  --  3.5   CHLORIDE  --  98  --    --  100  --  97*  --  97*   CO2  --  26  --   --  25  --  21*  --  23   BUN  --  38.1*  --   --  30.6*  --  80.2*  --  63.0*   CR  --  1.80*  --   --  1.46*  --  3.15*  --  2.62*   * 150* 136* 165* 182*   < > 129*   < > 136*    < > = values in this interval not displayed.     Liver Function Tests  No lab results found in last 7 days.    Coagulation Profile  No lab results found in last 7 days.      5. RADIOLOGY:   No results found for this or any previous visit (from the past 24 hour(s)).

## 2024-07-10 NOTE — PLAN OF CARE
Goal Outcome Evaluation:    ICU End of Shift Summary. See flowsheets for vital signs and detailed assessment.    Changes this shift: No acute changes this shift. 2 large watery BMs today, enteric stool test ordered, up to chair with therapy for about 2 hours, pressure supported for 38 minutes this morning and 28 this afternoon. Neph order to pull trialysis line when possible,  Ranjan will be back tomorrow.    Plan: Continue with POC, pressure support as tolerated, send stool sample if able, pull trialysis line.

## 2024-07-10 NOTE — PROGRESS NOTES
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  The patient was admitted on 24 for progressive hypoxia with dyspnea and worsening hypercapnia in ED requiring intubation likely d/t post-obstructive PNA 2/2 right bronchus stenosis.  Bronch confirming severe stenosis of right anastomosis with extensive RLL plugging.  IP bronch () with laser tissue debulking RMB stenosis, airway balloon dilation of RMB and BI, and placement of stent RMB to BI and bifurcating stent in RUL.  S/p volume resuscitation and transition to CRRT  for hypotension, stopped  and iHD resumed .  Increased agitation/delirium on  with increasing desaturation led to need for increased sedation.  Recurrence of paroxysmal afib with RVR first noted .  Remains intubated with pulmonary edema on imaging and septic shock requiring pressor support, limited/variable tolerance of PST.  S/p trach with ENT on 7/3 and GJ tube with IR on .  Anticipate eventual discharge to LTACH pending plan for increased steroids and vent weaning progress.     Today's recommendations:  - ImmuKnow () pending  - Increased steroids/taper initially deferred given some improvement noted on repeat chest CT although with ongoing elevated Prospera and limited  tolerance to PS trials will revisit steroids at lung transplant committee meeting 7/11  - CXR ordered 7/11  - Would defer LTACH pending steroid plan/taper and tolerance to PS trials  - Azithromycin held for now with prolonged QTc, repeat EKG for QTc monitoring in 1-2 days for ability to resume  - Tacrolimus level ordered 7/12  - Monitor need to adjust timing on Bactrim and VGCV ppx pending HD schedule  - Posaconazole per transplant ID (signed off 7/9), transplant ID follow up ~6-8 weeks  - CMV weekly monitoring (qTuesday), continue VGCV ppx (renally dosed) with plan for 3 weeks beyond completion of stress dose steroids (through 7/26) although will need to continue if begin increased steroids as above  - Consider LUE US/imaging given swelling     Septic shock:  Acute on chronic hypoxic/hypercapneic respiratory failure:  Post-obstructive multi-lobar PNA:  Right bronchial stenosis with RML collapse: Admitted with 2 weeks of progressive hypoxia, dyspnea, congested cough, and fatigue.  Chronic hypoxia with 2L NC, increased from 3 to 4L over this time with acute decompensation on day of admission associated with hypercapnia.  VBG 7/17/85 in ED s/p intubation.  IP bronch 2/15 s/p tissue debulking without stent placement.  Negative ID workup: respiratory panel, COVID, MRSA nares, PJP, Legionella, Strep pneumoniae, cocci, Histo, CrAg, fungitell x2, and A. galactomannan (blood).  IgG WNL.  Procal mildly elevated at 0.24 initially (then elevated to 1.77 6/20), LA normal, but febrile to 100.7.  TTE on admission grossly normal.  CT with RUL/RLL consolidative opacities, RML collapse, and narrowing of BI and distal RLL bronchus.  Started on norepi 6/19.  Bronch per MICU (6/19) with severe stenosis starting at right anastomosis, inspection with smaller scope revealing for extensive RLL mucous plugging.  Bronch per IP (6/20) with laser tissue debulking RMB stenosis, airway balloon dilation of RMB and BI, and placement of stent RMB  to BI and bifurcating stent in RUL.  Intermittent/low grade fevers persisting.  Respiratory cultures with Steno, VSE, VRE, Aspergillus ochraceus, and Elidia guilliermondii.  Chest CT (6/29) with multifocal panlobar opacities and small bibasilar pleural effusions.  Remains on full vent support, limited/variable tolerance of PST.  S/p bronch with BAL to lingula.  S/p trach with ENT on 7/3 and GJ tube with IR on 7/5.  S/p stress dose steroids 6/21-7/5.  Chest CT (7/8) with waxing and waning mostly improved right lung consolidation now with diffuse groundglass organizing opacities mostly in lower and middle lobe more than upper lobe, slight improvement in peribronchiolar consolidation in LLL but increased groundglass organization in KONRAD, and grossly patent stents, ectatic descending thoracic aorta approximately 4 cm, and decreased pleural thickening lung bases.  - ImmuKnow (7/8) pending  - Increased steroids/taper initially deferred given some improvement noted on repeat chest CT although with ongoing elevated Prospera and limited tolerance to PS trials will revisit steroids at lung transplant committee meeting 7/11  - BAL cultures (7/3 with Steno and Candida guilliermondii complex  - Repeat CXR ordered 7/11  - ABX: none; s/p linezolid (6/25-7/5), minocycline (6/20-7/5, Steno), ceftazidime (6/25-6/28), meropenem (6/21-6/24), daptomycin (6/21-6/24), levofloxacin (6/21-6/23), Zosyn (6/18-6/21), azithromycin 500 mg daily (6/18-6/20), and vancomycin (6/18)   - Nebs: levalbuterol QID, Mucomyst 10% BID alternating with 3% HTS (6/22), will need to be on NS nebs BID upon discharge per IP  - Vent management and weaning per MICU  - Palliative care following   - Health psych following  - LTACH timing TBD pending clinical course, would defer pending steroid plan/taper and tolerance to PS trials     S/p bilateral sequential lung transplant (BSLT) for ILD:   Progressive CLAD: Most recent OP visit in February.  DSA negative 7/3 (last  positive noted 2018).  Repeat ImmuKnow (6/19) 87, very low but IST adjustment deferred per Dr. Graham given acuity and steroids (after ImmuKnow level).  Repeat Prospera remains high at 2.3 on 6/19, may be artificially elevated with current infection, but also notably high at 2.42 on 2/14 and 2.26 on 7/3.  - PTA Singulair, azithromycin-->held for now with prolonged QTc, repeat EKG for QTc monitoring in 1-2 days for ability to resume, and Breo inhaler (resume once appropriate)     Immunosuppression:  On 2-drug IST since November d/t leukopenia and recurrent infections  - Tacrolimus 0.8 mg BID.  Goal level 8-10.  Repeat level ordered 7/12.  - Prednisone 5 mg daily (resumed 7/6 following stress dose steroids)     Prophylaxis:   - Bactrim 3x weekly for PJP ppx (monitor need to adjust timing pending HD schedule)     ID: H/o Actinomyces (10/17/23) and recurrent Steno (8/17/23 and 11/1/23).  S/p ABX as above.     Aspergillus ochraceus:  H/o Aspergillus empyema: S/p empyema drainage and ampho B instillation.  Previously on voriconazole, transitioned to posaconazole and managed by transplant ID as OP with last visit 3/13.  Calcified fungal elements remain with additional recurrent effusion (likely associated with fluid disturbances r/t iHD), but surgical intervention previously deferred d/t risk.  Posaconazole level 2.5 on 6/19.  Susceptibility testing done on fungal bronch culture 6/19 per transplant ID.  S/p micafungin 150 mg daily (6/25-7/9) per transplant ID.  - Posaconazole 300 mg daily chronically per transplant ID (signed off 7/9) with plan for transplant ID follow up ~6-8 weeks      H/o CMV viremia: Recurrent CMV viremia historically.  VGCV ppx most recently stopped 4/12.  Recent CMV low positive at 46 (6/12), <35 (6/18), 39 (6/25, 7/2) and now negative 7/9.  - CMV weekly monitoring (qTuesday) with steroid increase  - VGCV ppx (renally dosed, monitor need to adjust timing pending HD schedule) given stress dose steroids  (6/24) with tentative plan for 3 weeks beyond completion of stress dose steroids (through 7/26) although will need to continue if begin increased steroids as above     EBV viremia: S/p rituximab 12/13/23 x1 dose.  Most recent EBV negative 6/18.     Dilated CBD:  Suspected acute cholecystitis: MRCP (6/29) with CBD dilation with stones and cholelithiasis without cholecystitis.  GI signed off 6/30, recommending general surgery consult if RUQ pain persists.     ESRD on iHD: Kidney evaluation started as OP.  On qMWF iHD schedule, no missed sessions.  Transitioned to CRRT on 6/20, stopped 7/2 and iHD resumed 7/4.  On scheduled midodrine given hypotension.  Management per renal and MICU with fluid removal as able.      Pancytopenia: WBC 2.2-3.2 since 7/7.  Platelets down to 45 on 7/9.  On VGCV as above.  Also intermittently requiring pRBC.  Peripheral smear (7/9) with moderate normochromic, normocytic anemia with no increase in erythrocyte regeneration, slight leukopenia, marked thrombocytopenia with normal platelet morphology, and no overt evidence of dysplasia.  - CBC with differential daily, consider G-CSF if ANC </=0.8  - Management per MICU    VRE urine culture: Noted 6/19, >100k GNB and VRE faecium, ABX as above with management per transplant ID and MICU.    We appreciate the excellent care provided by the [place] [id number] team.  Recommendations communicated via in person rounding and this note.  Will continue to follow along closely, please do not hesitate to call with any questions or concerns.    Patient discussed with Dr. Macias.    Cindy Mcadams PA-C  [facility]  Pulmonary CF/Transplant     Subjective & Interval History:     Remains predominantly on full vent support, PS 10/5 30% for 20 minutes yesterday morning before SOB/increased WOB and requesting to switch back to vent.  HD with 1.9L removed yesterday, volume removal somewhat limited by hypotension.  LUE swelling.    Review of Systems:     ROS limited  "due to communication barriers with trach    C: No fever, + decreased weight  INTEGUMENTARY/SKIN: No rash or obvious new lesions  ENT/MOUTH: No sore throat, no sinus pain, no nasal congestion or drainage  RESP: See interval history  CV: No chest pain  GI: No nausea, no vomiting, + loose stools  : + anuric  MUSCULOSKELETAL: No myalgias, no arthralgias  ENDOCRINE: Blood sugars with adequate control  NEURO: No headache  PSYCHIATRIC: Mood stable    Physical Exam:     All notes, images, and labs from past 24 hours (at minimum) were reviewed.    Vital signs:  Temp: 97.4  F (36.3  C) Temp src: Oral BP: 117/60 Pulse: 83   Resp: 22 SpO2: 100 % O2 Device: Mechanical Ventilator Oxygen Delivery: 6 LPM Height: 160 cm (5' 3\") Weight: 52.1 kg (114 lb 13.8 oz)  I/O:   Intake/Output Summary (Last 24 hours) at 7/10/2024 1142  Last data filed at 7/10/2024 1100  Gross per 24 hour   Intake 1500 ml   Output 2075 ml   Net -575 ml     Constitutional: Lying in bed, in no apparent distress.   HEENT: Eyes with pink conjunctivae, anicteric.  Oral mucosa moist without lesions.  Trach in place.  PULM: Diminished air flow bilaterally.  Scattered crackles.  No rhonchi, no wheezes.  Non-labored breathing on full vent support.  CV: Normal S1 and S2.  RRR.  No murmur, gallop, or rub.  + LUE edema.  ABD: NABS, soft, nondistended.  GJ tube intact.  MSK: Moves all extremities.  + muscle wasting.   NEURO: Alert, conversant by mouthing words and nodding/shaking head yes/no to questions.   SKIN: Warm, dry, fragile.  PSYCH: Mood stable.     Data:     LABS    CMP:   Recent Labs   Lab 07/10/24  0822 07/10/24  0418 07/10/24  0416 07/10/24  0029 07/09/24 2012 07/09/24 2007 07/09/24  0415 07/09/24  0408 07/08/24  0809 07/08/24  0443 07/07/24  0742 07/07/24  0430 07/06/24  0740 07/06/24  0551   NA  --   --  130*  --   --  133*  --  129*  --  130*  --  132*   < > 127*   POTASSIUM  --   --  3.3*  --   --  3.6  --  3.4  --  3.5  --  3.5   < > 3.7   CHLORIDE  --   " "--  98  --   --  100  --  97*  --  97*  --  99   < > 95*   CO2  --   --  26  --   --  25  --  21*  --  23  --  24   < > 21*   ANIONGAP  --   --  6*  --   --  8  --  11  --  10  --  9   < > 11   * 156* 150* 136*   < > 182*   < > 129*   < > 136*   < > 115*   < > 131*   BUN  --   --  38.1*  --   --  30.6*  --  80.2*  --  63.0*  --  41.7*   < > 55.5*   CR  --   --  1.80*  --   --  1.46*  --  3.15*  --  2.62*  --  1.91*   < > 2.34*   GFRESTIMATED  --   --  32*  --   --  41*  --  16*  --  20*  --  29*   < > 23*   CHELSEY  --   --  8.4*  --   --  8.8  --  8.3*  --  8.1*  --  8.0*   < > 8.0*   MAG  --   --  1.9  --   --  1.6*  --  1.9  --  1.9  --  1.8   < > 2.1   PHOS  --   --   --   --   --   --   --  5.3*  --  5.0*  --  3.9  --  5.1*    < > = values in this interval not displayed.     CBC:   Recent Labs   Lab 07/10/24  0416 07/09/24  0820 07/09/24  0408 07/08/24  0815   WBC 2.2* 3.1* 3.1* 3.2*   RBC 2.31* 2.45* 2.40* 2.17*   HGB 7.5* 8.0* 7.8* 6.8*   HCT 22.0* 23.3* 22.9* 21.9*   MCV 95 95 95 101*   MCH 32.5 32.7 32.5 31.3   MCHC 34.1 34.3 34.1 31.1*   RDW 18.9* 18.6* 18.8* 19.9*   PLT 47* 48* 45* 56*       INR: No lab results found in last 7 days.    Glucose:   Recent Labs   Lab 07/10/24  0822 07/10/24  0418 07/10/24  0416 07/10/24  0029 07/09/24 2012 07/09/24  2007   * 156* 150* 136* 165* 182*       Blood Gas:   Recent Labs   Lab 07/07/24  0748   PHV 7.35   PCO2V 45   PO2V 59*   HCO3V 25   LASHAUN -0.5   O2PER 30       Culture Data No results for input(s): \"CULT\" in the last 168 hours.    Virology Data:   Lab Results   Component Value Date    FLUAH1 Not Detected 06/18/2024    FLUAH3 Not Detected 06/18/2024    DN0583 Not Detected 06/18/2024    IFLUB Not Detected 06/18/2024    RSVA Not Detected 06/18/2024    RSVB Not Detected 06/18/2024    PIV1 Not Detected 06/18/2024    PIV2 Not Detected 06/18/2024    PIV3 Not Detected 06/18/2024    HMPV Not Detected 06/18/2024    HRVS Negative 01/24/2021    ADVBE Negative " 01/24/2021    ADVC Negative 01/24/2021    ADVC Negative 12/23/2020    ADVC Negative 10/07/2019       Historical CMV results (last 3 of prior testing):  Lab Results   Component Value Date    CMVQNT Not Detected 07/09/2024    CMVQNT <35 (A) 06/18/2024    CMVQNT Not Detected 03/05/2024     Lab Results   Component Value Date    CMVLOG 1.6 07/02/2024    CMVLOG 1.6 06/25/2024    CMVLOG <1.5 06/18/2024       Urine Studies    Recent Labs   Lab Test 06/19/24  1214 01/24/21  1729   URINEPH 6.5 5.0   NITRITE Negative Negative   LEUKEST Large* Moderate*   WBCU >182* 34*       Most Recent [place] Pulmonary Function Testing (FVC/FEV1 only)  FVC-Pre   Date Value Ref Range Status   02/14/2024 0.85 L    12/19/2023 1.04 L    11/21/2023 0.85 L    10/24/2023 0.96 L      FVC-%Pred-Pre   Date Value Ref Range Status   02/14/2024 29 %    12/19/2023 36 %    11/21/2023 29 %    10/24/2023 33 %      FEV1-Pre   Date Value Ref Range Status   02/14/2024 0.80 L    12/19/2023 0.89 L    11/21/2023 0.78 L    10/24/2023 0.87 L      FEV1-%Pred-Pre   Date Value Ref Range Status   02/14/2024 34 %    12/19/2023 38 %    11/21/2023 33 %    10/24/2023 37 %        IMAGING    Recent Results (from the past 48 hour(s))   CT Chest w/o Contrast    Narrative    Chest CT without contrast    INDICATION: Evaluate bilateral lung opacities worsening compared to  previous CT per pulmonary transplant    COMPARISON: Most recent plain film earlier this morning. Most recent  available CT on PACS high-resolution study 6/18/2024    FINDINGS:  shows tracheostomy and median sternotomy. Opacities  are similar to the portable plain film noted is well.  No contrast. Dental artifact. The included thyroid appears  unremarkable. Tracheostomy appears appropriately positioned.  Atherosclerotic calcification left subclavian artery as well as right  brachiocephalic artery and in the thoracic aorta itself. Heart size  upper normal. Thoracic aorta is ectatic approximately 4.0 cm in  its  mid ascending portion. Main pulmonary artery approximately 3.0 cm also  upper normal.  Mild left breast skin thickening. This is unchanged.  No enlarged axillary, mediastinal or discrete hilar lymph nodes. Mild  pleural thickening bilaterally the lung bases appears slightly  decreased from the previous CT scan.  The included upper abdomen those right upper quadrant calcification  concerning for gallstone. Partially imaged tubing in the distal  stomach. Other atherosclerotic calcification in the splenic artery  which is quite tortuous.  Calcified right hilar lymph nodes.    Bone detail shows median sternotomy related to bilateral lung  transplant. Bones are osteopenic. Compression fracture T5 without  retropulsion and unchanged.  Small calcified right lower lobe superior segment granuloma. Mosaic  attenuation in the right upper lobe and superior segment right lower  lobe with increased groundglass organized opacities throughout the  basilar segments of the right lower lobe with some mosaic attenuation.  Left lung organizing groundglass opacities are present throughout the  entire lower lobe as well as much of the lingula and to lesser extent  left upper lobe proper. No other solid nodules. Calcified granuloma  left lower lobe. Rim calcified density at the left pleura posteriorly  unchanged.  Comparison with previous shows slight improvement in the right upper  lobe aeration inferiorly. There is also improved aeration in the  session the right lower lobe with the current organized opacities  previously appearing as dense consolidative opacities with air  bronchograms.  The left lung however show some increased groundglass opacification in  the upper lobe compared with previous in the lower lobe consolidative  opacities appear slightly decreased in the mosaicism appears similar.  Stent right mainstem bronchus and right upper lobe bronchus also in  rhonchorous intermedius. These do not appear significantly  opacified  internally.  Right IJ catheter tip impression right atrium. Other vascular  calcification in the SVC as well.      Impression    IMPRESSION:  1. Waxing and waning mostly improved right lung consolidation now with  diffuse groundglass organizing opacities mostly in the lower lobe an  middle lobe more than the upper lobe. Additionally, slight improvement  in the peribronchiolar consolidation in the left lower lobe but there  is increased groundglass organization in the left upper lobe. Grossly  patent stents right mainstem bronchus, proximal upper lobe bronchus  and bronchus intermedius.  2. Cholelithiasis.  3. Bilateral lung transplantation.  4. Atherosclerosis.  5. Tracheostomy.  6. Ectatic descending thoracic aorta approximately 4.0 cm.  7. Decreased pleural thickening lung bases.  8. Unchanged osteopenic T5 vertebral compression fracture.    TERRI ISSA MD         SYSTEM ID:  [id number]   IR Dialysis Fistulogram Left    Narrative    PROCEDURE: Dialysis circuit evaluation and interventions    Procedural Personnel  Attending physician(s): Kristine Hardy  Fellow physician(s): Chrissy Lobo  Resident physician(s): None  Advanced practice provider(s): None    Pre-procedure diagnosis: Fistula stenosis  Post-procedure diagnosis: Same  Indication: Prolonged bleeding at dialysis  Additional clinical history: None    Complications: No immediate complications.      Impression    IMPRESSION:    1. Segmental stenosis of the subclavian vein.  2. Uneventful balloon angioplasty of the subclavian stenosis.    Plan:    The graft can be used for dialysis immediately.  _______________________________________________________________    PROCEDURE SUMMARY:  - Access of dialysis circuit with ultrasound guidance  - Procedure: Angiogram and segment angioplasty  - Additional procedure(s): None    PROCEDURE DETAILS:    Pre-procedure  Consent: Informed consent for the procedure including risks, benefits  and alternatives was  obtained and time-out was performed prior to the  procedure.  Preparation: The site was prepared and draped using maximal sterile  barrier technique including cutaneous antisepsis.    Anesthesia/sedation  Level of anesthesia/sedation: Moderate sedation (conscious sedation)  Anesthesia/sedation administered by: Independent trained observer  under attending supervision with continuous monitoring of the  patient?s level of consciousness and physiologic status  Total intra-service sedation time (minutes): 60    Initial dialysis circuit evaluation  Dialysis circuit side: Left  Dialysis circuit type: Forearm loop graft  Initial circuit palpation: Palpable thrill    Access (towards outflow)  Local anesthesia was administered. The dialysis circuit was  sonographically evaluated. Real time ultrasound was used to vistualize  needle entry into the dialysis circuit and a permanent image was  stored.   Access technique: Micropuncture set with 21 gauge needle  Sheath size ([place]): 7    Initial angiography  Initial angiography of the dialysis circuit, outflow veins, and  central veins was performed.  Findings: Segment stenosis of the subclavian vein.    Angioplasty  Location: Left subclavian vein  Angioplasty balloon: [place]  Angioplasty balloon type: Conventional  Balloon length (mm): 40  Balloon diameter (mm): 8    Persistent waist after angioplasty with 8 x 40 [place] balloon.    Additional angioplasty was performed sequentially with 6 x 40 and 8 x  40 [place] balloons.  Location: Left subclavian vein  Angioplasty balloon: [place]  Angioplasty balloon type: Conventional  Balloon length (mm): 40  Balloon diameter (mm): 6, 8    Final angiography  Findings: Complete resolution of the stenosis in left subclavian vein.  Final dialysis circuit palpation: Palpable thrill    Closure (outflow)  The sheath was removed and hemostasis was achieved with an external  device. A sterile dressing was applied.    Contrast  Contrast agent:  Visipaque 320  Contrast volume (mL): 30    Radiation Dose  Fluoroscopy time (minutes): 8.3    Reference air kerma (mGy): 38.2   Kerma area product (uGy-m2): 229.66      Additional Details  Additional description of procedure: None  Registry event: V/3/g  Device used: None  Equipment details: None  Unique Device Identifiers: Not available  Specimens removed: None  Estimated blood loss (mL): Less than 30  Standardized report: [email or web address]tion_v3.1    Attestation  Signer name: SANDY CURRY MD  I attest that I was present for the entire procedure. I reviewed the  stored images and agree with the report as written.

## 2024-07-10 NOTE — PROGRESS NOTES
Care Management Follow Up    Length of Stay (days): 22    Expected Discharge Date:       Concerns to be Addressed: discharge planning  Likely discharge to LTACH, awaiting family decision on facility  Patient plan of care discussed at interdisciplinary rounds: Yes    Anticipated Discharge Disposition: LTACH     Anticipated Discharge Services: None  Anticipated Discharge DME:      Patient/family educated on Medicare website which has current facility and service quality ratings: yes  Education Provided on the Discharge Plan: Yes  Patient/Family in Agreement with the Plan: yes    Referrals Placed by CM/SW: LTACH   Private pay costs discussed: Not applicable    Additional Information:    Per provider, patient is med ready. Updated LTACH pending review.    Per provider, team wants to potentially do steroid burst and taper and they have asked us to hold off on LTAC transfer until mid to late next week. LTACH liaison notified.    CC will continue to follow up.  Eli Schreiber RN, PHN, BSN   Float Nurse Care Coordinator  Covering for Unit MICU/CICU  Phone [phone]

## 2024-07-11 NOTE — PROGRESS NOTES
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 Nephrology consulted for management of HD while admitted.      Interval History :   Mrs Blue is planned for HD today and will see how fistula does as she had some bleeding after last run. Stable from HD standpoint, currently on TTS schedule.  Stopped tums as they are not very effective on continuous TF and tend to cause clogging of feeding tubes.  Will check Phos tomorrow but last Phos was reasonable at 5.3.      Assessment & Recommendations:   ESRD-ESKD: pt dialyzes MWF at [facility] (ph [phone], f [phone]) with Dr. Glasgow. Run time: 3.5 hrs. EDW 52.5 kg. Access: L AVF. +heparin 1,500u bolus.                  -Can pull temp HD line.       -Holding on run today,                 -No need for new dialysis consent, continuing long term HD for ESRD.                -Appreciate IR doing plasty yesterday to fistula, will run today and pull temp line assuming no issues.      Outpt Rx:       Volume-Wt as low as 49kg during her course (although may have been dry at the time as she needed some neosynephrine), EDW likely a bit lower with ICU stay ~50-51kg. Running for 1-2L today as BP's are stable.       Pulm-Admitted with hypercapnic resp failure, treated for PNA. Trached, progressing well.       Electrolytes-Stable.     BMD-No acute issues.      Anemia-Hgb 7.3, last PRBC's 6/26 on venofer 50mg weekly but will hold while treating for infection. On Mircera 60mcg w4lyeod, will cover with short term 4k Epo with runs while admitted when stable enough for HD.       Nutrition-Novasource renal     "  Time spent: 50 minutes on this date of encounter for chart review, physical exam, medical decision making and co-ordination of care.      Seen and discussed with Dr Goodman     Recommendations were communicated to primary team via verbal communication.     ADRIÁN Lo CNS  Clinical Nurse Specialist  [phone]    Review of Systems:   I reviewed the following systems:  Gen: No fevers or chills  CV: No CP at rest  Resp: No SOB at rest  GI: No N/V    Physical Exam:   I/O last 3 completed shifts:  In: 1366 [I.V.:10; NG/GT:696]  Out: 175 [Stool:175]   /57   Pulse 75   Temp 98  F (36.7  C) (Oral)   Resp 20   Ht 1.6 m (5' 3\")   Wt 52.7 kg (116 lb 2.9 oz)   LMP 06/07/2014 (Exact Date)   SpO2 100%   BMI 20.58 kg/m       GENERAL APPEARANCE: Vent via trach   EYES: No scleral icterus  Pulmonary: Stable on vent  CV: Regular rhythm, normal rate   - Edema none  GI: soft, nontender, normal bowel sounds  MS: no evidence of inflammation in joints, no muscle tenderness  : No Basilio  SKIN: no rash, warm, dry  NEURO: No focal deficits    Labs:   All labs reviewed by me  Electrolytes/Renal -   Recent Labs   Lab Test 07/11/24  0831 07/11/24  0518 07/11/24  0517 07/10/24  0418 07/10/24  0416 07/09/24 2012 07/09/24 2007 07/09/24  0415 07/09/24  0408 07/08/24  0809 07/08/24  0443 07/07/24  0742 07/07/24  0430   NA  --  131*  --   --  130*  --  133*  --  129*  --  130*  --  132*   POTASSIUM  --  3.8  --   --  3.3*  --  3.6  --  3.4  --  3.5  --  3.5   CHLORIDE  --  98  --   --  98  --  100  --  97*  --  97*  --  99   CO2  --  25  --   --  26  --  25  --  21*  --  23  --  24   BUN  --  57.5*  --   --  38.1*  --  30.6*  --  80.2*  --  63.0*  --  41.7*   CR  --  2.52*  --   --  1.80*  --  1.46*  --  3.15*  --  2.62*  --  1.91*   * 122* 131*   < > 150*   < > 182*   < > 129*   < > 136*   < > 115*   CHELSEY  --  8.6*  --   --  8.4*  --  8.8  --  8.3*  --  8.1*  --  8.0*   MAG  --   --   --   --  1.9  --  1.6*  --  " 1.9  --  1.9  --  1.8   PHOS  --   --   --   --   --   --   --   --  5.3*  --  5.0*  --  3.9    < > = values in this interval not displayed.       CBC -   Recent Labs   Lab Test 07/11/24  0518 07/10/24  0416 07/09/24  0820   WBC 2.3* 2.2* 3.1*   HGB 7.3* 7.5* 8.0*   PLT 63* 47* 48*       LFTs -   Recent Labs   Lab Test 07/11/24  0518 07/02/24  0359 07/01/24  1637 07/01/24  0346   ALKPHOS 282* 176*  --  173*   BILITOTAL 0.4 0.6  --  0.6   ALT 13 37  --  36   AST 30 26  --  25   PROTTOTAL 5.1* 5.4*  --  5.0*   ALBUMIN 2.6* 2.8* 2.7* 2.6*       Iron Panel -   Recent Labs   Lab Test 02/03/21  0415 12/13/18  1033 08/01/18  0921   IRON 51 16* 93   IRONSAT 36 7* 37   YOLA  --  302* 571*           Current Medications:  Current Facility-Administered Medications   Medication Dose Route Frequency Provider Last Rate Last Admin    acetaminophen (TYLENOL) tablet 975 mg  975 mg Oral or Feeding Tube Q8H Jailyn Lou MD   975 mg at 07/11/24 0512    acetylcysteine (MUCOMYST) 10 % nebulizer solution 4 mL  4 mL Inhalation BID Velez Reyes, German, MD   4 mL at 07/10/24 1607    [Held by provider] azithromycin (ZITHROMAX) tablet 250 mg  250 mg Oral or Feeding Tube Daily Velez Reyes, German, MD   250 mg at 07/09/24 0828    [Held by provider] calcium carbonate (TUMS) chewable tablet 500 mg  500 mg Oral Once per day on Sunday Tuesday Thursday Saturday Josesito Pratt MD   500 mg at 07/09/24 0834    chlorhexidine (PERIDEX) 0.12 % solution 15 mL  15 mL Mouth/Throat Q12H Chio Cortez MD   15 mL at 07/10/24 2025    epoetin alisha-epbx (RETACRIT) injection 4,000 Units  4,000 Units Intravenous Once in dialysis/CRRT Hardy Tran APRN CNS        fiber modular (BANATROL TF) packet 1 packet  1 packet Per Feeding Tube Q8H Awa Watt MD        heparin (porcine) injection  500 Units Hemodialysis Machine OR IV Push Once in dialysis/CRRT Hardy Tran APRN CNS        heparin ANTICOAGULANT injection 5,000 Units   5,000 Units Subcutaneous Q8H Jailyn Lou MD   5,000 Units at 07/11/24 0650    insulin aspart (NovoLOG) injection (RAPID ACTING)  1-12 Units Subcutaneous Q4H Edin Davis MD   1 Units at 07/10/24 2032    insulin NPH injection 8 Units  8 Units Subcutaneous BID Velez Reyes, German, MD   8 Units at 07/11/24 0856    levalbuterol (XOPENEX) neb solution 0.63 mg  0.63 mg Nebulization 4x Daily Gareth Mosquera MD   0.63 mg at 07/10/24 2042    [Held by provider] magnesium chloride CR tablet 1,070 mg  1,070 mg Oral Once per day on Sunday Tuesday Thursday Saturday Josesito Pratt MD        methylPREDNISolone sodium succinate (solu-MEDROL) 1,000 mg in sodium chloride 0.9 % 283 mL intermittent infusion  1,000 mg Intravenous Q24H Jamaal Banda APRN CNP        metoprolol tartrate (LOPRESSOR) tablet 25 mg  25 mg Oral BID Velez Reyes, German, MD   25 mg at 07/11/24 0650    midodrine (PROAMATINE) tablet 10 mg  10 mg Oral or Feeding Tube Q8H BMT Jailyn Lou MD   10 mg at 07/11/24 0512    montelukast (SINGULAIR) tablet 10 mg  10 mg Oral At Bedtime Velez Reyes, German, MD   10 mg at 07/10/24 2201    multivitamin RENAL (RENAVITE RX/NEPHROVITE) tablet 1 tablet  1 tablet Oral or Feeding Tube Daily Awa Watt MD   1 tablet at 07/11/24 0856    pantoprazole (PROTONIX) 2 mg/mL suspension 40 mg  40 mg Per Feeding Tube Daily Randi James MD   40 mg at 07/10/24 2025    posaconazole (NOXAFIL) DR tablet TBEC 300 mg  300 mg Per Feeding Tube Daily Josesito Pratt MD   300 mg at 07/10/24 1359    predniSONE (DELTASONE) tablet 5 mg  5 mg Oral Daily Rossana Sarabia APRN CNP   5 mg at 07/11/24 0856    [START ON 7/14/2024] predniSONE (DELTASONE) tablet 50 mg  50 mg Oral Daily Jamaal Banda APRN CNP        protein modular (PROSOURCE TF20) packet 1 packet  1 packet Per Feeding Tube Daily Edin Davis MD   1 packet at 07/11/24 0856    QUEtiapine (SEROquel) tablet 50 mg  50 mg Oral or  Feeding Tube At Bedtime Kajal Chong APRN CNP   50 mg at 07/10/24 2201    ramelteon (ROZEREM) tablet 8 mg  8 mg Oral At Bedtime Velez Reyes, German, MD   8 mg at 07/10/24 2025    sodium chloride (NEBUSAL) 3 % neb solution 3 mL  3 mL Nebulization BID Velez Reyes, German, MD   3 mL at 07/10/24 2043    sodium chloride (PF) 0.9% PF flush 10 mL  10 mL Intracatheter Q8H Kajal Chong APRN CNP   10 mL at 07/10/24 2306    sodium chloride (PF) 0.9% PF flush 10 mL  10 mL Intracatheter Q8H Kajal Chong APRN CNP   10 mL at 07/11/24 0857    sodium chloride 0.9% BOLUS 250 mL  250 mL Intravenous Once in dialysis/CRRT Hardy Tran APRN CNS        sodium chloride 0.9% BOLUS 300 mL  300 mL Hemodialysis Machine Once Hardy Tran APRN CNS        sulfamethoxazole-trimethoprim (BACTRIM) 400-80 MG per tablet 1 tablet  1 tablet Oral or Feeding Tube Once per day on Tuesday Thursday Saturday Randi James MD   1 tablet at 07/09/24 2022    tacrolimus (GENERIC) suspension 0.8 mg  0.8 mg Oral or Feeding Tube BID IS Yonny Orona MD   0.8 mg at 07/11/24 0856    traZODone (DESYREL) half-tab 25 mg  25 mg Per Feeding Tube At Bedtime Jailyn Lou MD   25 mg at 07/10/24 2201    valGANciclovir (VALCYTE) solution 450 mg  450 mg Per Feeding Tube Once per day on Tuesday Saturday Randi James MD   450 mg at 07/09/24 2015    Vitamin D3 (CHOLECALCIFEROL) tablet 50 mcg  50 mcg Oral Once per day on Monday Wednesday Friday Velez Reyes, German, MD   50 mcg at 07/10/24 0826     Current Facility-Administered Medications   Medication Dose Route Frequency Provider Last Rate Last Admin    dextrose 10% infusion   Intravenous Continuous PRN Velez Reyes, German, MD   Stopped at 07/03/24 1600    dextrose 10% infusion   Intravenous Continuous PRN Awa Watt MD   Stopped at 07/05/24 1400    heparin 10,000 units/10 mL infusion (DIALYSIS USE)  500 Units/hr Hemodialysis Machine Continuous Hardy Tran APRN  CNS        Stop Heparin 60 minutes before end of treatment   Does not apply Continuous PRN Hardy Tran, ADRIÁN CNS

## 2024-07-11 NOTE — PROGRESS NOTES
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  The patient was admitted on 24 for progressive hypoxia with dyspnea and worsening hypercapnia in ED requiring intubation likely d/t post-obstructive PNA 2/2 right bronchus stenosis.  Bronch confirming severe stenosis of right anastomosis with extensive RLL plugging.  IP bronch () with laser tissue debulking RMB stenosis, airway balloon dilation of RMB and BI, and placement of stent RMB to BI and bifurcating stent in RUL.  S/p volume resuscitation and transition to CRRT  for hypotension, stopped  and iHD resumed .  Increased agitation/delirium on  with increasing desaturation led to need for increased sedation.  Recurrence of paroxysmal afib with RVR first noted .  Remains intubated with pulmonary edema on imaging and septic shock requiring pressor support, limited/variable tolerance of PST.  S/p trach with ENT on 7/3 and GJ tube with IR on .  Discharge to LTACH pending plan for steroid taper and vent weaning progress.     Today's recommendations:  - Repeat steroid burst today with prednisone 1 mg/kg daily, taper TBD based on clinical response, defer discharge to LTACH pending steroid taper plan  - CXR today  - PTA azithromycin held for prolonged  QTc, remains elevated today  - Tacrolimus repeat level ordered 7/12  - Prednisone chronic dosing on hold again with steroid burst as above  - Posaconazole continued per transplant ID with future f/u  - CMV weekly monitoring (qTuesday) and VGCV ppx with increased steroid courses, duration TBD     Septic shock:  Acute on chronic hypoxic/hypercapneic respiratory failure:  Post-obstructive multi-lobar PNA:  Right bronchial stenosis with RML collapse: Admitted with 2 weeks of progressive hypoxia, dyspnea, congested cough, and fatigue.  Chronic hypoxia with 2L NC, increased from 3 to 4L over this time with acute decompensation on day of admission associated with hypercapnia.  VBG 7/17/85 in ED s/p intubation.  IP bronch 2/15 s/p tissue debulking without stent placement.  Negative comprehension ID workup on admission.  Procal mildly elevated at 0.24 initially (then elevated to 1.77 6/20), LA normal, but febrile to 100.7.  TTE on admission grossly normal.  CT with RUL/RLL consolidative opacities, RML collapse, and narrowing of BI and distal RLL bronchus.  Bronch per MICU (6/19) with severe stenosis starting at right anastomosis, inspection with smaller scope revealing for extensive RLL mucous plugging.  S/p bronch per IP (6/20) with laser tissue debulking RMB stenosis, airway balloon dilation of RMB and BI, and placement of stent RMB to BI and bifurcating stent in RUL.  Respiratory cultures with Steno, VSE, VRE, Aspergillus ochraceus, and Elidia guilliermondii.  Chest CT (6/29) with multifocal panlobar opacities and small bibasilar pleural effusions.  S/p trach with ENT on 7/3 and GJ tube with IR on 7/5.  S/p stress dose steroids 6/21-7/5.  Chest CT (7/8) with waxing and waning mostly improved right lung consolidation  and new diffuse GGO >BLL, slight improvement in peribronchiolar consolidation in LLL, and grossly patent stents, ectatic descending thoracic aorta approximately 4 cm, and decreased pleural thickening lung  bases.  Continues to only tolerate brief PST.  - ABX: none currently; s/p linezolid (6/25-7/5), minocycline (6/20-7/5, Steno), ceftazidime (6/25-6/28), meropenem (6/21-6/24), daptomycin (6/21-6/24), levofloxacin (6/21-6/23), Zosyn (6/18-6/21), azithromycin 500 mg daily (6/18-6/20), and vancomycin (6/18)   - Repeat steroid burst today with prednisone 1 mg/kg daily, taper TBD based on clinical response, defer discharge to LTACH pending steroid taper plan  - Nebs: levalbuterol QID, Mucomyst 10% BID alternating with 3% HTS (6/22), will need to be on NS nebs BID upon discharge per IP  - Vent management and weaning per MICU  - CXR today with improved RLL perihilar opacities (personally reviewed)     S/p bilateral sequential lung transplant (BSLT) for ILD:   Progressive CLAD: Most recent OP visit in February.  DSA negative 7/3 (last positive noted 2018).  Repeat ImmuKnow (6/19) 87, very low but IST adjustment deferred per Dr. Graham given acuity and steroids (after ImmuKnow level).  Repeat Prospera remains high at 2.3 on 6/19, may be artificially elevated with current infection, but also notably high at 2.42 on 2/14 and 2.26 on 7/3.  ImmuKnow (7/8) remains low at 89, defer IST adjustment at this time.  - PTA Singulair  - PTA Breo inhaler on hold with trach  - PTA azithromycin held 7/10 for prolonged QTc, remains elevated today     Immunosuppression:  On 2-drug IST since November d/t leukopenia and recurrent infections  - Tacrolimus 0.8 mg BID.  Goal level 8-10.  Repeat level ordered 7/12.  - Prednisone 5 mg daily (resumed 7/6 following stress dose steroids), on hold again with steroid burst as above     Prophylaxis:   - Bactrim for PJP ppx (renally dosed)     ID: H/o Actinomyces (10/17/23) and recurrent Steno (8/17/23 and 11/1/23).  S/p ABX as above.     Aspergillus ochraceus:  H/o Aspergillus empyema: S/p empyema drainage and ampho B instillation.  Previously on voriconazole, transitioned to posaconazole and managed by  transplant ID as OP with last visit 3/13.  Calcified fungal elements remain with additional recurrent effusion (likely associated with fluid disturbances r/t iHD), but surgical intervention previously deferred d/t risk.  Posaconazole level 2.5 on 6/19.  Susceptibility testing done on fungal bronch culture 6/19 per transplant ID.  S/p micafungin 150 mg daily (6/25-7/9) per transplant ID.  - Posaconazole 300 mg daily chronically per transplant ID (signed off 7/9) with plan for transplant ID follow up ~6-8 weeks      H/o CMV viremia: Recurrent CMV viremia historically.  VGCV ppx most recently stopped 4/12.  Recent CMV low positive at 46 (6/12), <35 (6/18), 39 (6/25, 7/2) and now negative 7/9.  - CMV weekly monitoring (qTuesday) and VGCV ppx (6/24, renally dosed) with increased steroid courses, duration TBD     EBV viremia: S/p rituximab 12/13/23 x1 dose.  Most recent EBV negative 6/18.     Dilated CBD:  Suspected acute cholecystitis: MRCP (6/29) with CBD dilation with stones and cholelithiasis without cholecystitis.  GI signed off 6/30, recommending general surgery consult if RUQ pain persists.     ESRD on iHD: Kidney evaluation started as OP.  On qMWF iHD schedule, no missed sessions.  Transitioned to CRRT on 6/20, stopped 7/2 and iHD resumed 7/4.  On scheduled midodrine given hypotension.  Management per renal and MICU with fluid removal as able.      Pancytopenia: WBC 2.2-3.2 since 7/7.  Platelets down to 45 on 7/9.  On VGCV as above.  Also intermittently requiring pRBC.  Peripheral smear (7/9) with moderate normochromic, normocytic anemia with no increase in erythrocyte regeneration, slight leukopenia, marked thrombocytopenia with normal platelet morphology, and no overt evidence of dysplasia.  - CBC with differential daily, consider G-CSF if ANC </=0.8     VRE urine culture: Noted 6/19, >100k GNB and VRE faecium, ABX as above with management per transplant ID and MICU.     We appreciate the excellent care provided  "by the MICU team.  Recommendations communicated via in person rounding and this note.  Will continue to follow along closely, please do not hesitate to call with any questions or concerns.     Patient discussed with Dr. Macias.    Hina Huff, DNP, APRN, CNP  Inpatient Nurse Practitioner  Pulmonary CF/Transplant     Subjective & Interval History:     Remains on full vent support via trach, tolerated only 38 minutes of PST in the morning at 10/5/30 and then 28 minutes in the afternoon.  No notable secretions via trach despite persistent course breath sounds.    Review of Systems:     ROS as above, otherwise significantly limited due to intubation and sedation.    Physical Exam:     All notes, images, and labs from past 24 hours (at minimum) were reviewed.    Vital signs:  Temp: 97.6  F (36.4  C) Temp src: Axillary BP: 108/53 Pulse: 78   Resp: 19 SpO2: 100 % O2 Device: Mechanical Ventilator Oxygen Delivery: 6 LPM Height: 160 cm (5' 3\") Weight: 52.7 kg (116 lb 2.9 oz)  I/O:   Intake/Output Summary (Last 24 hours) at 7/11/2024 0715  Last data filed at 7/11/2024 0600  Gross per 24 hour   Intake 1331 ml   Output 175 ml   Net 1156 ml     Constitutional: Lying supine in bed, in no apparent distress.   HEENT: Eyes with pink conjunctivae, anicteric.  Oral mucosa moist without lesions.  Trach cdi.  PULM: Good air flow bilaterally.  Coarse crackles t/o, scattered rhonchi, no wheezes.  Non-labored breathing on full vent support.  CV: Normal S1 and S2.  RRR.  No murmur, gallop, or rub.  No peripheral edema.   ABD: NABS, soft, nontender, nondistended.  PEG/J tube site not visualized.  MSK: Moves all extremities.     NEURO: Sedated, not conversant.   SKIN: Warm, dry.  No rash on limited exam.   PSYCH: Calm.     Data:     LABS    CMP:   Recent Labs   Lab 07/11/24  0518 07/11/24  0517 07/10/24  2305 07/10/24  2031 07/10/24  0418 07/10/24  0416 07/09/24 2012 07/09/24  2007 07/09/24  0415 07/09/24  0408 07/08/24  0809 07/08/24  0443 " "07/07/24  0742 07/07/24  0430 07/06/24  0740 07/06/24  0551   *  --   --   --   --  130*  --  133*  --  129*  --  130*  --  132*   < > 127*   POTASSIUM 3.8  --   --   --   --  3.3*  --  3.6  --  3.4  --  3.5  --  3.5   < > 3.7   CHLORIDE 98  --   --   --   --  98  --  100  --  97*  --  97*  --  99   < > 95*   CO2 25  --   --   --   --  26  --  25  --  21*  --  23  --  24   < > 21*   ANIONGAP 8  --   --   --   --  6*  --  8  --  11  --  10  --  9   < > 11   * 131* 127* 154*   < > 150*   < > 182*   < > 129*   < > 136*   < > 115*   < > 131*   BUN 57.5*  --   --   --   --  38.1*  --  30.6*  --  80.2*  --  63.0*  --  41.7*   < > 55.5*   CR 2.52*  --   --   --   --  1.80*  --  1.46*  --  3.15*  --  2.62*  --  1.91*   < > 2.34*   GFRESTIMATED 21*  --   --   --   --  32*  --  41*  --  16*  --  20*  --  29*   < > 23*   CHELSEY 8.6*  --   --   --   --  8.4*  --  8.8  --  8.3*  --  8.1*  --  8.0*   < > 8.0*   MAG  --   --   --   --   --  1.9  --  1.6*  --  1.9  --  1.9  --  1.8   < > 2.1   PHOS  --   --   --   --   --   --   --   --   --  5.3*  --  5.0*  --  3.9  --  5.1*    < > = values in this interval not displayed.     CBC:   Recent Labs   Lab 07/11/24  0518 07/10/24  0416 07/09/24  0820 07/09/24  0408   WBC 2.3* 2.2* 3.1* 3.1*   RBC 2.26* 2.31* 2.45* 2.40*   HGB 7.3* 7.5* 8.0* 7.8*   HCT 22.3* 22.0* 23.3* 22.9*   MCV 99 95 95 95   MCH 32.3 32.5 32.7 32.5   MCHC 32.7 34.1 34.3 34.1   RDW 20.9* 18.9* 18.6* 18.8*   PLT 63* 47* 48* 45*       INR: No lab results found in last 7 days.    Glucose:   Recent Labs   Lab 07/11/24  0518 07/11/24  0517 07/10/24  2305 07/10/24  2031 07/10/24  1620 07/10/24  1255   * 131* 127* 154* 171* 159*       Blood Gas:   Recent Labs   Lab 07/07/24  0748   PHV 7.35   PCO2V 45   PO2V 59*   HCO3V 25   LASHAUN -0.5   O2PER 30       Culture Data No results for input(s): \"CULT\" in the last 168 hours.    Virology Data:   Lab Results   Component Value Date    FLUAH1 Not Detected 06/18/2024    " FLUAH3 Not Detected 06/18/2024    ZA5531 Not Detected 06/18/2024    IFLUB Not Detected 06/18/2024    RSVA Not Detected 06/18/2024    RSVB Not Detected 06/18/2024    PIV1 Not Detected 06/18/2024    PIV2 Not Detected 06/18/2024    PIV3 Not Detected 06/18/2024    HMPV Not Detected 06/18/2024    HRVS Negative 01/24/2021    ADVBE Negative 01/24/2021    ADVC Negative 01/24/2021    ADVC Negative 12/23/2020    ADVC Negative 10/07/2019       Historical CMV results (last 3 of prior testing):  Lab Results   Component Value Date    CMVQNT Not Detected 07/09/2024    CMVQNT <35 (A) 06/18/2024    CMVQNT Not Detected 03/05/2024     Lab Results   Component Value Date    CMVLOG 1.6 07/02/2024    CMVLOG 1.6 06/25/2024    CMVLOG <1.5 06/18/2024       Urine Studies    Recent Labs   Lab Test 06/19/24  1214 01/24/21  1729   URINEPH 6.5 5.0   NITRITE Negative Negative   LEUKEST Large* Moderate*   WBCU >182* 34*       Most Recent [facility] Pulmonary Function Testing (FVC/FEV1 only)  FVC-Pre   Date Value Ref Range Status   02/14/2024 0.85 L    12/19/2023 1.04 L    11/21/2023 0.85 L    10/24/2023 0.96 L      FVC-%Pred-Pre   Date Value Ref Range Status   02/14/2024 29 %    12/19/2023 36 %    11/21/2023 29 %    10/24/2023 33 %      FEV1-Pre   Date Value Ref Range Status   02/14/2024 0.80 L    12/19/2023 0.89 L    11/21/2023 0.78 L    10/24/2023 0.87 L      FEV1-%Pred-Pre   Date Value Ref Range Status   02/14/2024 34 %    12/19/2023 38 %    11/21/2023 33 %    10/24/2023 37 %        IMAGING    No results found for this or any previous visit (from the past 48 hour(s)).

## 2024-07-11 NOTE — PLAN OF CARE
ICU End of Shift Summary. See flowsheets for vital signs and detailed assessment.    Changes this shift: Patient reports sleeping well between cares. Do not disturb in place from [id number]. Refusing staff repositions but verbalizes understanding of risks and shifting weight throughout shift. CMV settings at 30% FiO2 overnight. MAPs >65.     Plan:  Steroid burst and taper per pulm transplant and then LTACH. Continue pressure support trials BID.     Goal Outcome Evaluation:      Plan of Care Reviewed With: patient    Overall Patient Progress: improvingOverall Patient Progress: improving    Outcome Evaluation: see note

## 2024-07-11 NOTE — PROGRESS NOTES
PALLIATIVE CARE PROGRESS NOTE  [facility]     Patient Name: Kecia Blue  Date of Admission: 6/18/2024   Today the patient was seen for: patient support     Recommendations & Counseling     GOALS OF CARE:   Life-prolonging without limits at this time  Kecia does not want any treatment that will only prolong her dying if there is no reasonable hope of recovery to an active life. (Per healthcare directive on file)  Today Kecia and her  Ranjan share that they have been told her steroid dose will be increased out of concerns for inflammatory process in her lungs (possible rejection) which may be contributing to her struggles with PS trials. She and Ranjan confirm they wish to continue to work on recovery but also that they are worried about how things are going.      ADVANCE CARE PLANNING:  Patient has an advance directive dated January 2018.  Primary Health Care Agent  Ranjan.  Alternate(s) three daughters as co-alternatives.   There is no POLST form on file, defer to patient and/or next of kin for decisions   Code status: Full Code     MEDICAL MANAGEMENT:   #sleep:  On bedtime seroquel, ramelteon and trazodone per primary team    #anxiety:  Health psychology and lung transplant SW following for support. Palliative care SW planning to meet patient and family as well.   Anxiety tied to dyspnea. But also now additional anxiety related to her slow progress and worries about her ability to recover.        PSYCHOSOCIAL/SPIRITUAL SUPPORT:  Family  and three daughters, children in law and 5 grand children ages 5-13.   Friends many back home in [place], [place]     Palliative Care will continue to follow during this admission and will be available as outpatient clinic as well. Thank you for the consult and allowing us to aid in the care of Kecia Blue.     These recommendations have been discussed with primary team via this note.     Palliative Care will  continue to follow. Thank you for the consult and allowing us to aid in the care of Kecia Blue.      Lissett English MD  Securely message with [employer] (more info)  Text page via 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  The patient was admitted on 6/18/24 for progressive hypoxia with dyspnea and worsening hypercapnia in ED requiring intubation likely d/t post-obstructive PNA 2/2 right bronchus stenosis.  Bronch confirming severe stenosis of right anastomosis with extensive RLL plugging.  IP bronch (6/20) with laser tissue debulking RMB stenosis, airway balloon dilation of RMB and BI, and placement of stent RMB to BI and bifurcating stent in RUL.  S/p volume resuscitation and transition to CRRT 6/20 for hypotension, stopped 7/2.  Increased agitation/delirium on 6/22 with increasing desaturation led to need for increased sedation.  Recurrence of paroxysmal afib with RVR first noted 6/25.  Remains intubated with pulmonary edema on imaging and septic shock requiring pressor support, limited/variable tolerance of PST.  She underwent Trach on 7/3/24 and PEG on July 5, 2024.  Currently - having difficulty with PS trials and new ground glass opacities on CT raising some concerns for possible rejection and lung transplant team increased steroids 7/11     Today, the patient was seen for:  Patient and Family support  Goals of care                 Interval History:     Multidisciplinary collaboration:  Reviewed  case with RN and lung transplant and MICU    Patient/family narrative  Rapid SOB with PS trials- feels there is a big anxiety component but also feels very SOB and tired  Worried about needing to start steroids and what this means for her recovery  Sad that she is not yet ready for LTAC, worried about future  Sleeping well  Denies any significant pain or GI symtpoms. Sometimes has episodes of nausea after meds but this is not persistent or long lasting             Medications:     I have reviewed this patient's medication profile and medications from this hospitalization.     Notable medications:    Scheduled tylenol  Seroquel 50mg bedtime  Remelteon 8mg bedtime  Trazodone 25mg bedtime               Physical Exam:   Temp:  [97.6  F (36.4  C)-98.2  F (36.8  C)] 97.6  F (36.4  C)  Pulse:  [100-116] 100  Resp:  [12-19] 19  BP: ()/(72-80) 110/80  SpO2:  [96 %-100 %] 99 %  199 lbs 15.32 oz    Gen: sitting/lying in bed. Appears comfortable   HEENT: NCAT. Conjunctiva clear. Sclera anicteric . trach  CV: Peripheral perfusion intact.   Resp: unlabored work of breathing,   Abd:   Msk: no gross deformity,   Skin:  no jaundice  Ext: warm, well perfused.   Neuro: face symmetric. EOM, vision, hearing grossly intact. Speech fluent. Moves all extremities   Mental status/Psych: alert. Oriented. Asks/answers questions appropriately. Affect is calm, fatigued, mildly anxious                 Data Reviewed:     CMP  Recent Labs   Lab 07/11/24  1610 07/11/24  1148 07/11/24  0831 07/11/24  0518 07/10/24  0418 07/10/24  0416 07/09/24 2012 07/09/24  2007 07/09/24  0415 07/09/24  0408 07/08/24  0809 07/08/24  0443 07/07/24  0742 07/07/24  0430 07/06/24  0740 07/06/24  0551   NA  --   --   --  131*  --  130*  --  133*  --  129*  --  130*  --  132*   < > 127*   POTASSIUM  --   --   --  3.8  --  3.3*  --  3.6  --  3.4  --  3.5  --  3.5   < > 3.7   CHLORIDE  --   --   --  98  --  98  --  100  --  97*  --  97*  --  99   < > 95*   CO2  --    --   --  25  --  26  --  25  --  21*  --  23  --  24   < > 21*   ANIONGAP  --   --   --  8  --  6*  --  8  --  11  --  10  --  9   < > 11   * 104* 139* 122*   < > 150*   < > 182*   < > 129*   < > 136*   < > 115*   < > 131*   BUN  --   --   --  57.5*  --  38.1*  --  30.6*  --  80.2*  --  63.0*  --  41.7*   < > 55.5*   CR  --   --   --  2.52*  --  1.80*  --  1.46*  --  3.15*  --  2.62*  --  1.91*   < > 2.34*   GFRESTIMATED  --   --   --  21*  --  32*  --  41*  --  16*  --  20*  --  29*   < > 23*   CHELSEY  --   --   --  8.6*  --  8.4*  --  8.8  --  8.3*  --  8.1*  --  8.0*   < > 8.0*   MAG  --   --   --   --   --  1.9  --  1.6*  --  1.9  --  1.9  --  1.8   < > 2.1   PHOS  --   --   --   --   --   --   --   --   --  5.3*  --  5.0*  --  3.9  --  5.1*   PROTTOTAL  --   --   --  5.1*  --   --   --   --   --   --   --   --   --   --   --   --    ALBUMIN  --   --   --  2.6*  --   --   --   --   --   --   --   --   --   --   --   --    BILITOTAL  --   --   --  0.4  --   --   --   --   --   --   --   --   --   --   --   --    ALKPHOS  --   --   --  282*  --   --   --   --   --   --   --   --   --   --   --   --    AST  --   --   --  30  --   --   --   --   --   --   --   --   --   --   --   --    ALT  --   --   --  13  --   --   --   --   --   --   --   --   --   --   --   --     < > = values in this interval not displayed.     CBC  Recent Labs   Lab 07/11/24  0518 07/10/24  0416 07/09/24  0820 07/09/24  0408   WBC 2.3* 2.2* 3.1* 3.1*   RBC 2.26* 2.31* 2.45* 2.40*   HGB 7.3* 7.5* 8.0* 7.8*   HCT 22.3* 22.0* 23.3* 22.9*   MCV 99 95 95 95   MCH 32.3 32.5 32.7 32.5   MCHC 32.7 34.1 34.3 34.1   RDW 20.9* 18.9* 18.6* 18.8*   PLT 63* 47* 48* 45*     INRNo lab results found in last 7 days.  Arterial Blood Gas  Recent Labs   Lab 07/07/24  0748   O2PER 30           Medical Decision Making       MANAGEMENT DISCUSSED with the following over the past 24 hours: MICU, lung transplant pulmonologist    NOTE(S)/MEDICAL RECORDS REVIEWED  over the past 24 hours: pulmonary, MICU, nursing  Tests REVIEWED in the past 24 hours:  - BMP  - CBC  SUPPLEMENTAL HISTORY, in addition to the patient's history, over the past 24 hours obtained from:   - Spouse or significant other

## 2024-07-11 NOTE — PROGRESS NOTES
Date: 7/11/2024  Time: 1630     Data:  Pre Wt:   52.7 kg, Bed scale  Desired Wt:  to removed 2  Post Wt:  51.3 kg,   Weight change: - 1.2 kg  Ultrafiltration - Post Run Net Total Removed (mL):  1200 ml  Vascular Access Status: Graft patent  Dialyzer Rinse:  Light streaked   Total Blood Volume Processed: 49.8 L   Total Dialysis (Treatment) Time:   3 Hrs  Dialysate Bath: K 3, Ca 2.25  ,   Heparin: Heparin 500 units loading + 500 units/hr     Lab:   No    Interventions:  Dialysis done through left AV Fistula using 15 gauge needles  Epo 4,000 units administered per MAR  New onset of pitting edema in the left hand,BFR decreased to 300,Hardy NP notified,ordered ultrasound and terminate HD.  Treatment has ended and  blood is rinsed back completely  Decannulation done post HD, hemostasis is achieved in 15 minutes  Pressure dressing is applied, to be removed after 4 to 6 hrs  Report given to LOUISE Hinojosa, kept pt in room with stable condition     Assessment:  A & O x trached, calm and cooperative, denies pain  left arm AV Fistula/ graft has good thrill and bruit  According to patient AVG Site- down is venous and up is arterial                Plan:    Per Renal team      LOYDA DE LA ROSAN, RN  Acute Dialysis RN  [facility]

## 2024-07-11 NOTE — PROGRESS NOTES
MEDICAL ICU PROGRESS NOTE  07/11/2024      Date of Service (when I saw the patient): 07/11/2024    ASSESSMENT: Kecia Blue is a 63 year old female with PMH ILD s/p bilateral lung transplant (2018) c/b recurrent right bronchial stenosis s/p repeat balloon dilation/stenting, repeat opportunistic pneumonia (PJP 2021, aspergillus/stenotrophomonas 11/2023) and viremias (EBV, CMV), and ESRD 2/2 tacrolimus toxicity on HD who was admitted on 6/18/2024 for acute hypercapnic respiratory failure 2/2 tracheal stenosis and pneumonia.  Status post airway stent placement by IP on 6/20. Hospital course complicated by hypotension, delirium, and prolonged respiratory failure.    Changes today:  - Steroid burst today with prednisone 1 mg/kg daily, taper TBD based on clinical response, defer discharge to LTACH pending steroid taper plan per transplant pulm  - Hold PTA prednisone dose  - Remove temporary dialysis catheter today after HD per nephrology  - HD today  - Continue midodrine 10 mg TID  - Continue BID SBT trials on pressure support 10/5 as tolerated  - Enteric and viral PCR stool sample still pending   - Hepatic panel add on  - Continue to hold azithromycin for elevated Qtc per pulm transplant  - Monitor blood glucose with steroid burst, consider starting insulin gtt if persistently >180 mg/dL      PLAN:    Neuro:  #Pain   - Switch Acetaminophen scheduling to 1 gram q8h to minimize nightly disturbances    #Toxic metabolic encephalopathy, improving  Ongoing lethargy in setting of sepsis and delirium in the setting of high-dose steroid therapy. Patient reports improved sleep on current medications. Following commands this morning.   - Quetiapine 50 mg at bedtime   - Ramalteon and trazodone 25mg for sleep - change medications timing   - delirium precautions  - Do not disturb order overnight to promote sleep    #Anxiety  Patient has been having anxiety regarding pressure support trials and weaning from the ventilator.   and patient would like to talk to somebody while in the hospital. Will plan to consult Health Psychology tomorrow (as not here on weekends).   - Health Psychology consulted, seeing patient once per week while admitted    Pulmonary:  #Acute on chronic hypoxic hypercapnic respiratory failure, improving  #HAP, Stenotrophomonas maltophilia, Enterococcus faecalis, and Candida guilliermondi complex  #Severe pulmonary edema  #Acute respiratory distress syndrome, resolving  Presented to the ED on 6/18/2024 with acute on chronic hypoxic hypercapnic respiratory failure. Transferred to MICU and intubated on overnight on 6/18. Workup significant for Stenotrophomonas, Enterococcus, and Candida pneumonia. Course was c/b progression to ARDS (6/21-22) with elevated plateau pressures. Pt remain intubated d/t ongoing pulmonary edema iso ESRD on iHD. CXR (6/26) with similar pulmonary opacities compared to 6/24, left > right.  The etiology of this groundglass opacity was unclear but ddx includes rejection vs. infection vs. volume overload. Ongoing course breath sounds with some expiratory wheezes on exam.  - s/p tracheostomy 7/4-> ENT removed sutures and perform trach dressing change 7/8  - Pulmonary toilet  - Mucomyst BID  - Hypertonic saline BID, alternate with mucomyst  - Albuterol neb QID  - Last VBG- (7/7)- pH 7.35, pCO2 45, pO2 90.7 on 30% FiO2, p/f ratio 302  - CT chest w/o contrast 7/8: Waxing and waning mostly improved right lung consolidation now with diffuse groundglass organizing opacities mostly in the lower lobe an middle lobe more than the upper lobe. Slight improvement in the peribronchiolar consolidation in the left lower lobe but there is increased groundglass organization in the left upper lobe.   - CXR 7/11: increased opacities left mid and lower lung fields, concerning for atelectasis v edema. Improved RLL perihilar opacities.  - Antibiotics, as below  - Volume management with iHD  - Mechanical ventilation,  wean as able  - Continue Daily SBT (AM and PM) on pressure support 10/5    Vent Mode: CMV/AC  (Continuous Mandatory Ventilation/ Assist Control)  FiO2 (%): 30 %  Resp Rate (Set): 18 breaths/min  Tidal Volume (Set, mL): 320 mL  PEEP (cm H2O): 5 cmH2O  Pressure Support (cm H2O): 10 cmH2O  Resp: 19      #Recurrent right middle bronchus stenosis s/p dilation and stent (6/20/2024)  Has history (bronchoscopy 2/15/24) demonstrating narrowing of right mainstem transplant anastomosis and bronchus intermedius. CT chest (6/18/2024) and bronchoscopy (6/19/2024) demonstrated occlusion of right middle bronchus. With concern for post-obstructive pneumonia, interventional pulmonology was consulted, and completed bronchoscopy (6/20/2024) with tissue debulking, right middle bronchus balloon dilation to 11 mm, stent placement in right main bronchus, and bifurcating cast placement in right upper bronchus. Plan for saline nebs BID and outpatient bronchoscopy in 6 weeks.   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  - Transplant pulm consulted and following  - Prospera (7/3) pending  - DSA (7/3) pending  - Tacrolimus level therapeutic, no dose adjustment, repeat level ordered 7/12   - PTA nebs  - Fluticasone-viilanterol (breo ellipta) every day - HOLD with respiratory infection  - Montelukast every day   - Immunosuppressants per Tx Pulm:   - PTA Tacrolimus (dosing per tx pulm)  - PTA Prednisone 5 mg daily   - PTA azathioprine - HOLD per Transplant pulmonology with repeat infections  - Peripheral smear (7/9) pending for pancytopenia  - Antibiotic prophylaxis   - Bactrim MWF for PJP ppx (monitor need to adjust pending HD plan)  - CMV weekly monitoring  (qTuesday)  - Azithromycin per pulmonary   - Continue VGCV ppx (renally dosed, monitor need to adjust pending HD plan) with tentative plan for 3 weeks beyond completion of stress dose steroids   - CT chest w/o contrast 7/8 per above  - Steroid burst today with prednisone 1 mg/kg daily, taper TBD based on clinical response, defer discharge to LTACH pending steroid taper plan per transplant pulm  - Hold PTA prednisone dose given burst dose    Cardiovascular:  #Septic shock, improving  On 6/19/2024, developed sepsis (hypotension 80s/50s, tachypnea 24) in the setting of stenotrophomonas pneumonia/enterococcus faecium VRE UTI. Etiology of shock likely distributive iso sepsis; less likely hypovolemic (not pulling volumes with CRRT), medication-associated (weaned off of propofol gtt) or obstructive (low suspicion for PE, echo 6/19 without evidence of cardiac dysfunction).   Pressor requirements slowly improving but are sluggish, driven by low diastolic pressure. Has been having transient episodes of MAPs <65 and into 50s overnight. Attempted to change timing of metoprolol to see if that improves patient's blood pressures, but could just be consistent with when she is sleeping. Episodes self-resolving by next blood pressure check.   - continue PTA prednisone 5 mg daily    Past course:  - Hydrocortisone 50mg q6H (6/21-6/26); 50 mg BID (6/26-6/28); 25 mg BID (6/28-7/3), 25 mg daily (7/3-7/5)  - Fludrocortisone 0.1 mg qday (6/21-6/28); 0.05 mg qday (6/28-30)  - MAPs with drop to 52 once overnight, resolved without intervention  - Continue midodrine 10 mg TID      #Demand Ischemia, resolved    Presented with elevated troponin (peak 499). Not associated with chest pain or hypoxia. EKG without ST elevations/depressions or T wave inversions. Suspect demand ischemia in the setting of sepsis. Will continue to monitor symptoms and continuous telemetry. EKG 7/2 sinus rhythm with PACs.     #Paroxysmal A-Fib, improved  #Pulmonary  Hypertension   #Transient arhythmia with palpitations  History of pulmonary hypertension (45 mmHg, echo 10/2023) in the setting of ILD and paroxysmal afib on PTA metoprolol tartrate BID. Not on anticoagulation for afib. Transient unspecified arhythmia overnight (7/9) with palpitations. EKG 7/9 sinus rhythm with fusion complexes and known RBBB. Repeat EKG 7/10 showing sinus rhythm with no RBBB or fusion complexes. Patient given magnesium 1g and 20mEq K+ for K 3.3 this am. No further episodes of palpitations. Currently in sinus rhythm.  - continue PTA metoprolol tartrate 25mg BID  - CTM    #Prolonged Qtc  Qtc 540 yesterday- pulm tx held prophylactic azithromycin  - EKG 7/11: Qtc 480 today, sinus rhythm  -CTM    GI/Nutrition:  #Nutrition  PEG-J placed by IR, continue tube feeds.   - Nutrition consult  - Tube feeds 30ml/hr; at goal    #Diarrhea  On 6/21, had 8 bowel movements. Occurred in the setting of scheduled bowel regimen and starting new antibiotics (meropenem, levofloxacin). C diff negative. Ongoing diarrhea x 2 days now.  - Holding PRN bowel regimen  - Miralax PRN   - Senna prn  - Trial of Nutrisource Fiber and monitor-3 packets daily per nutrition recs  - Stool enteric bacteria and viral PCR panel ordered but not yet collected, will follow up  - Consider loperamide if stool pathogen testing negative    #Cholelithiasis with gallbladder wall thickening, resolved  During admission patient found to have up trending LFTs and fever in setting of GB wall thickening on CT abdomen/pelvis, now resolved. MRCP completed showing borderline dilated CBD up to 1.1 cm, no significant intrahepatic biliary dilation, no evidence of choledocholithiasis, no evidence of acute cholecystitis.    Renal/Fluids/Electrolytes:  #ESRD on iHD (M,W,F)  History of ESRD 2/2 Tacrolimus toxicity on HD (MWF). With soft blood pressures, placed RIJ (6/20/2024) and started CRRT (6/20/2024). US of AVF 7/5: arterial inflow patent without inflow  stenosis, normal diameter of anastamosis, venous outflow elevated velocity at subclavian- suggestive of recurrent stenosis in venous outflow (area of prior angioplasty in March). IR consulted who noted that IR fistulogram not required at that time, and was able to run iHD on 7/6 off her fistula.   - Nephrology consulted and following for ESRD  - will follow recommendations for need of HD  - Renally-dosed medications  - BMP daily  - mild hypernatremia but stable  - Fistulogram and subclavian angioplasty yesterday with IR  - Net +1.2L yesterday  - Next HD today per nephrology      Endocrine:  #Risk for hyperglycemia with stress-dose steroids, resolved  Stress dose steroids discontinued, resume PTA prednisone 5 mg daily.  - NPH 8U BID  -CTM     ID:  #HAP, Stenotrophomonas maltophilia, Enterococcus faecalis, Aspergillus, and Candida guilliermondi complex  Presented to the ED on 6/18/2024 with SOB and hypercapnic respiratory failure. Found on bronchoscopy (6/19) to have RML obstruction by narrowing bronchus intermedius as well as copious mucopurulent secretions/mucus plugging. Previously treated for Stenotrophonomas/aspergillus pneumonia in 11/2023 with subsequent sputum culture (2/2024) negative for both Stenotrophonomas/Aspergillus. Etiology likely repeat Stenotrophomonas infection vs opportunistic infection from colonized microbe.   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    - Continue  posaconazole 300 mg/day    - Complete 14 days of micafungin 150 mg/day today (6/25-7/9)- discontinued   - Continue VGCV prophylaxis  - Weekly CMV VL (Tuesdays, next 7/9), last was 39 (7/2)    - Azithromycin per pulmonary   - TMP/SMX SS daily for PJP PPx (for life given h/o PJP)  - Awaiting susceptibilities on Aspergillus per transplant ID  - Present antibiotics   - Continue posaconazole 300 mg/day per pulm transplant (6/25 to present) for candida guillermondii and asergillus ochraceus on prior BAL  - Past antibiotics   - s/p micafungin 150 mg/day (6/25-7/9)  - s/p IV minocycline 100mg BD x 14 days total (6/20-7/5)- for stenotrophamonas  - PO linezolid 600 mg BID x 10 days (6/25-7/5)- for VRE in urine and BAL cultures  - S/p ceftazidime (6/25-6/28)    - S/p vancomycin (6/18-6/20)   - S/p zosyn (6/18-21)  - S/p Daptomycin (6/21-6/23)  - S/p Meropenem (6/21-6/24)  - S/p Levofloxacin (6/21-6/23)    - BAL fluid (7/3): pink, hazy, cell counts normal range, fluid sent for viral, bacterial and fungal cultures negative to date, cytology negative for malignancy and organisms; preliminary AFB negative      #Pyuria, Enterococcus faecium VRE and  ESBL E. Coli   Asymptomatic. With progressive IV pressor needs, obtained broad infectious workup which demonstrated UCx (6/19/2024) with Enterococcus faecium VR and ESBL E. Coli. S/p Daptomycin (6/21-6/23) and Meropenem (6/21-6/24).    #Hx Aspergillus PNA (11/2023)  #Hx PJP PNA (1/2021)  #Hx CMV viremia, recurrent  #Hx EBV viremia  - Transplant ID consult, appreciate recs  PTA posaconazole (Treatment for hx of aspergillus PNA)  PTA azithromycin 250mg qd (CLAD ppx)  PTA bactrim (PJP ppx)  -CMV PVR 7/9- negative     Hematology:    #Acute on Chronic Normocytic Anemia, stable  #Thrombocytopenia, chronic  History of chronic anemia in the setting of kidney disease (baseline Hgb 10-11). Upon admission, Hgb 9. May be bone marrow suppression in the setting of chronic illness; potential  contribution by blood loss with CRRT filter changes (~150-200c). Peripheral smear (6/18/2024) without evidence of schistocytes. Hemoglobin low today at 7.1, no signs of active bleeding. Additionally, platelets low at 52 today.   - Continue to monitor CBC in AM  - monitor if need to hold DVT prophylaxis in the AM  - Per nephrology, 4000 units epoetin daily  - Hgb improved this morning (7.8) after transfusion 1 unit pRBC yesterday  - PLTs stable, currently 63k    Musculoskeletal:  - PT / OT consult     Skin:  - Sacral Blanching- off load with frequent turns in bed    General Cares/Prophylaxis:    DVT Prophylaxis: subcutaneous heparin    GI Prophylaxis: PPI  Restraints: None  Family Communication:  to be updated by phone  Code Status: Full Code     Lines/tubes/drains:  - PIV x2, midline  - RIJ- remove today per nephrology  - Tracheostomy  - PEG/J    Disposition:  - Medical ICU  - Defer discharge to LTACH pending steroid taper plan per transplant pulm    Patient seen and findings/plan discussed with medical ICU staff, Dr. Orozco.    Edin Davis MD  Internal Medicine Resident, PGY-1    Clinically Significant Risk Factors        # Hypokalemia: Lowest K = 3.3 mmol/L in last 2 days, will replace as needed     # Hypomagnesemia: Lowest Mg = 1.6 mg/dL in last 2 days, will replace as needed   # Hypoalbuminemia: Lowest albumin = 2.2 g/dL at 6/21/2024  4:26 PM, will monitor as appropriate   # Thrombocytopenia: Lowest platelets = 47 in last 2 days, will monitor for bleeding                 #Precipitous drop in Hgb/Hct: Lowest Hgb this hospitalization: 6.8 g/dL. Will continue to monitor and treat/transfuse as appropriate.      # Moderate Malnutrition: based on nutrition assessment    # Financial/Environmental Concerns: none                ====================================  INTERVAL HISTORY:   No acute events overnight. Patient denies pain and reports sleeping well overnight. Transient drop in MAP to 50s again  overnight with recovery to 60s without intervention. No change in left forearm edema, no signs of bleeding. Pulm transplant to start steroid burst and taper today.     OBJECTIVE:   1. VITAL SIGNS:   Temp:  [97.4  F (36.3  C)-98.4  F (36.9  C)] 98  F (36.7  C)  Pulse:  [77-92] 80  Resp:  [18-25] 19  BP: ()/(36-72) 93/37  FiO2 (%):  [30 %] 30 %  SpO2:  [98 %-100 %] 100 %  Vent Mode: CMV/AC  (Continuous Mandatory Ventilation/ Assist Control)  FiO2 (%): 30 %  Resp Rate (Set): 18 breaths/min  Tidal Volume (Set, mL): 320 mL  PEEP (cm H2O): 5 cmH2O  Pressure Support (cm H2O): 10 cmH2O  Resp: 19    2. INTAKE/ OUTPUT:   I/O last 3 completed shifts:  In: 1421 [I.V.:50; NG/GT:711]  Out: 175 [Stool:175]    3. PHYSICAL EXAMINATION:  General: Sitting up in bed, NAD   HEENT: Atraumatic, moist mucous membranes, trach in place c/d/I.  Pulm/Resp: Ongoing coarse crackles diffusely throughout, unchanged from prior exam. Non-labored breathing on vent support.   CV: RRR, no m/r/g. No peripheral edema  Abdomen: Soft, non-distended, non-tender, bowel sounds present. PEG/J in place c/d/I.  Ext: Trace bilateral LE edema, pulses 2+ radial, pedal  Incisions/Skin: No rashes or lesions  Neuro: Awake and alert x 3, follows commands and nods and shakes head to questions, moving all extremities equally    4. LABS:   Arterial Blood Gases   No lab results found in last 7 days.    Complete Blood Count   Recent Labs   Lab 07/11/24  0518 07/10/24  0416 07/09/24  0820 07/09/24  0408   WBC 2.3* 2.2* 3.1* 3.1*   HGB 7.3* 7.5* 8.0* 7.8*   PLT 63* 47* 48* 45*     Basic Metabolic Panel  Recent Labs   Lab 07/11/24  0518 07/11/24  0517 07/10/24  2305 07/10/24  2031 07/10/24  0418 07/10/24  0416 07/09/24  2012 07/09/24  2007 07/09/24  0415 07/09/24  0408   *  --   --   --   --  130*  --  133*  --  129*   POTASSIUM 3.8  --   --   --   --  3.3*  --  3.6  --  3.4   CHLORIDE 98  --   --   --   --  98  --  100  --  97*   CO2 25  --   --   --   --  26  --   25  --  21*   BUN 57.5*  --   --   --   --  38.1*  --  30.6*  --  80.2*   CR 2.52*  --   --   --   --  1.80*  --  1.46*  --  3.15*   * 131* 127* 154*   < > 150*   < > 182*   < > 129*    < > = values in this interval not displayed.     Liver Function Tests  No lab results found in last 7 days.    Coagulation Profile  No lab results found in last 7 days.      5. RADIOLOGY:   No results found for this or any previous visit (from the past 24 hour(s)).

## 2024-07-11 NOTE — PLAN OF CARE
ICU End of Shift Summary. See flowsheets for vital signs and detailed assessment.    Changes this shift: Pt denies pain, VSS. Attempted PS x2 for ~5 min and ~40 mins today, steroids increased per pulm transplant. TF remain at goal. Mutliple loose watery stools. Enteric panel sent but cancelled by ID lab, team aware. HD run today, LUE noticeably more edematous towards end of dialysis, nephrology aware. Pt in/out of wide complex sinus rhythm similar to rhythm noted on EKG from 7/9 at 1942, team aware, pt stable, EKG obtained, but patient back in previous rhythm on time of assessment. Up in chair ~2 hrs, up with lift. Ranjan (spouse) updated at bedside.     Plan: will keep HD CVC per nephrology until fistula is further assessed, update team with changes    Goal Outcome Evaluation:      Plan of Care Reviewed With: patient    Overall Patient Progress: no changeOverall Patient Progress: no change    Outcome Evaluation: see note

## 2024-07-12 NOTE — CONSULTS
"Palliative Care Initial Social Work Note  Location: [facility]    Patient Info:  Kecia Blue is a 63 year old female with history of lung transplant on 3/1/2018.    Brief summary of visit:   Met with Kecia and Ranjan to introduce self and role. Kecia was alert and engaged in conversation. She reports that she's feeling well supported by Lung Transplant SW and Health Psychology. Ranjan reports that he feels like he's coping ok for now. They both thought it was helpful to know that there's additional support available for Ranjan if this hospitalization continues to be longer than they expected.       Date of Admission: 6/18/2024    Reason for consult: Patient and family support    Sources of information: Patient  Family member - (Ranjan)  Staff -Lung Transplant SW, Palliative Care MD  Other -Chart Review    Recommendations & Plan:  -PCSW will continue to be available for additional emotional support as needed while Palliative Care team is following, but will not follow closely. Please reach out if needs arise.        Symptoms & Concerns Addressed Today:  Caregiver -Offered support for caregivers/family  Emotional -Ranjan reports that he's coping \"ok\" for now, appreciated additional check on how he's doing.   Family    Strengths Identified:    -Good family support.     Relationships & Support:  Aspects of relationships and support assessed today:  Identified family members:  (Ranjan), 3 adult daughters, 5 grandchildren  Professional supports: Lung Transplant Team, Health Psychology  Family coping: Ranjan reports that he's coping ok for now.   Bereavement Risk concerns: needs further assessment    Coping, Mental Health & Adjustment to Illness:   Adjustment to Illness/Hospitalization    Goals, Decision Making & Advance Care Planning:   Prognosis, Goals, and/or Advance Care Planning were assessed today: No  If yes, brief summary of discussion: n/a  Preferred language: English  Patient's decision making " preferences: not assessed  I have concerns about the patient/family's health literacy today: No  Patient has a completed Health Care Directive: Yes, and on file.  Code status per chart review: Full Code      Clinical Social Work Interventions:   Assessment of palliative specific issues    Introduction of Palliative clinical social work interventions   Adjustment to illness counseling  Facilitation of processing of thoughts/feelings  Family communication facilitated      VERENICE Estes  [facility], Palliative Care  Securely message with the [employer] Web Console (learn more here) or  Text page via [employer] Paging/Directory

## 2024-07-12 NOTE — PROGRESS NOTES
MEDICAL ICU PROGRESS NOTE  07/12/2024      Date of Service (when I saw the patient): 07/12/2024    ASSESSMENT: Kecia Blue is a 63 year old female with PMH ILD s/p bilateral lung transplant (2018) c/b recurrent right bronchial stenosis s/p repeat balloon dilation/stenting, repeat opportunistic pneumonia (PJP 2021, aspergillus/stenotrophomonas 11/2023) and viremias (EBV, CMV), and ESRD 2/2 tacrolimus toxicity on HD who was admitted on 6/18/2024 for acute hypercapnic respiratory failure 2/2 tracheal stenosis and pneumonia.  Status post airway stent placement by IP on 6/20. Hospital course complicated by hypotension, delirium, and prolonged respiratory failure. Tracheostomy placement on 7/3 and PEGJ tube placement with IR on 7/5.     Changes today:  - follow up with pulm transplant in regards to steroid plan  - follow up regarding restarting home azithromycin  - trialysis line still in place, pending removal based off of US of fistula this AM  - Continue midodrine 10 mg TID  - Continue BID SBT trials on pressure support 10/5 as tolerated  - Enteric and viral PCR stool sample still pending   - blood glucose has remained stable with high dose steroids      PLAN:    Neuro:  #Pain   - Switch Acetaminophen scheduling to 1 gram q8h to minimize nightly disturbances    #Toxic metabolic encephalopathy, improving  Ongoing lethargy in setting of sepsis and delirium in the setting of high-dose steroid therapy. Patient reports improved sleep on current medications. Following commands this morning.   - Quetiapine 50 mg at bedtime   - Ramalteon and trazodone 25mg for sleep - change medications timing   - delirium precautions  - Do not disturb order overnight to promote sleep    #Anxiety  Patient has been having anxiety regarding pressure support trials and weaning from the ventilator.  and patient would like to talk to somebody while in the hospital. Will plan to consult Health Psychology tomorrow (as not here on  weekends).   - Health Psychology consulted, seeing patient once per week while admitted    Pulmonary:  #Acute on chronic hypoxic hypercapnic respiratory failure, improving  #HAP, Stenotrophomonas maltophilia, Enterococcus faecalis, and Candida guilliermondi complex  #Severe pulmonary edema  #Acute respiratory distress syndrome, resolved  Presented to the ED on 6/18/2024 with acute on chronic hypoxic hypercapnic respiratory failure. Transferred to MICU and intubated on overnight on 6/18. Workup significant for Stenotrophomonas, Enterococcus, and Candida pneumonia. Course was c/b progression to ARDS (6/21-22) with elevated plateau pressures.  CXR (6/26) with similar pulmonary opacities compared to 6/24, left > right.  The etiology of this groundglass opacity was unclear but ddx includes rejection vs. infection vs. volume overload. Tracheostomy placed on 7/3 to assist with ventilator weaning.   - s/p tracheostomy 7/3-> ENT removed sutures and perform trach dressing change 7/8  - Pulmonary toilet  - Mucomyst BID  - Hypertonic saline BID, alternate with mucomyst  - Albuterol neb QID  - CT chest w/o contrast 7/8: Waxing and waning mostly improved right lung consolidation now with diffuse groundglass organizing opacities mostly in the lower lobe an middle lobe more than the upper lobe. Slight improvement in the peribronchiolar consolidation in the left lower lobe but there is increased groundglass organization in the left upper lobe.   - CXR 7/11: increased opacities left mid and lower lung fields, concerning for atelectasis v edema. Improved RLL perihilar opacities.  - Antibiotics, as below  - Volume management with iHD  - Mechanical ventilation, wean as able  - Continue Daily SBT (AM and PM) on pressure support 10/5  - pulm transplant following  - will follow up regarding plan for steroids today    Vent Mode: CMV/AC  (Continuous Mandatory Ventilation/ Assist Control)  FiO2 (%): 30 %  Resp Rate (Set): 18  breaths/min  Tidal Volume (Set, mL): 320 mL  PEEP (cm H2O): 5 cmH2O  Pressure Support (cm H2O): 10 cmH2O  Resp: 19      #Recurrent right middle bronchus stenosis s/p dilation and stent (6/20/2024)  Has history (bronchoscopy 2/15/24) demonstrating narrowing of right mainstem transplant anastomosis and bronchus intermedius. CT chest (6/18/2024) and bronchoscopy (6/19/2024) demonstrated occlusion of right middle bronchus. With concern for post-obstructive pneumonia, interventional pulmonology was consulted, and completed bronchoscopy (6/20/2024) with tissue debulking, right middle bronchus balloon dilation to 11 mm, stent placement in right main bronchus, and bifurcating cast placement in right upper bronchus. Plan for saline nebs BID and outpatient bronchoscopy in 6 weeks.   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  - Transplant pulm consulted and following  - Prospera (7/3) pending  - DSA (7/3) pending  - Tacrolimus level therapeutic, no dose adjustment, repeat level ordered 7/12   - PTA nebs  - Fluticasone-viilanterol (breo ellipta) every day - HOLD with respiratory infection  - Montelukast every day   - Immunosuppressants per Tx Pulm:   - PTA Tacrolimus (dosing per tx pulm)  - PTA Prednisone 5 mg daily   - PTA azathioprine - HOLD per Transplant pulmonology with repeat infections  - Peripheral smear (7/9) pending for pancytopenia  - Antibiotic prophylaxis   - Bactrim MWF for PJP ppx (monitor need to adjust pending HD plan)  - CMV weekly monitoring (qTuesday)  - Azithromycin per pulmonary (holding due to QTC, but will follow up)  - Continue VGCV ppx (renally dosed, monitor need to adjust pending HD plan) with tentative plan for 3 weeks  beyond completion of stress dose steroids   - CT chest w/o contrast 7/8 per above  - follow up steroid plan for today via pulm transplant  - Hold PTA prednisone dose given burst dose    Cardiovascular:  #Septic shock, improving  On 6/19/2024, developed sepsis (hypotension 80s/50s, tachypnea 24) in the setting of stenotrophomonas pneumonia/enterococcus faecium VRE UTI. Etiology of shock likely distributive iso sepsis; less likely hypovolemic (not pulling volumes with CRRT), medication-associated (weaned off of propofol gtt) or obstructive (low suspicion for PE, echo 6/19 without evidence of cardiac dysfunction).   Pressor requirements slowly improving but are sluggish, driven by low diastolic pressure. Has been having transient episodes of MAPs <65 and into 50s overnight. Attempted to change timing of metoprolol to see if that improves patient's blood pressures, but could just be consistent with when she is sleeping. Episodes self-resolving by next blood pressure check.   - continue PTA prednisone 5 mg daily    Past course:  - Hydrocortisone 50mg q6H (6/21-6/26); 50 mg BID (6/26-6/28); 25 mg BID (6/28-7/3), 25 mg daily (7/3-7/5)  - Fludrocortisone 0.1 mg qday (6/21-6/28); 0.05 mg qday (6/28-30)  - Continue midodrine 10 mg TID      #Demand Ischemia, resolved    Presented with elevated troponin (peak 499). Not associated with chest pain or hypoxia. EKG without ST elevations/depressions or T wave inversions. Suspect demand ischemia in the setting of sepsis. Will continue to monitor symptoms and continuous telemetry. EKG 7/2 sinus rhythm with PACs.     #Paroxysmal A-Fib, improved  #Pulmonary Hypertension   #Transient arhythmia with palpitations  History of pulmonary hypertension (45 mmHg, echo 10/2023) in the setting of ILD and paroxysmal afib on PTA metoprolol tartrate BID. Not on anticoagulation for afib. Transient unspecified arhythmia overnight (7/9) with palpitations. EKG 7/9 sinus rhythm with fusion complexes and  known RBBB. Repeat EKG 7/10 showing sinus rhythm with no RBBB or fusion complexes. Patient given magnesium 1g and 20mEq K+ for K 3.3 this am. No further episodes of palpitations. Currently in sinus rhythm.  - continue PTA metoprolol tartrate 25mg BID  - CTM    #Prolonged Qtc  Qtc 540 yesterday- pulm tx held prophylactic azithromycin  - EKG 7/11: Qtc 480 today, sinus rhythm  -CTM    GI/Nutrition:  #Nutrition  PEG-J placed by IR on 7/5, continue tube feeds.   - Nutrition consult  - Tube feeds 30ml/hr; at goal    #Diarrhea  On 6/21, had 8 bowel movements. Occurred in the setting of scheduled bowel regimen and starting new antibiotics (meropenem, levofloxacin). C diff negative. Ongoing diarrhea x 2 days now.  - Holding PRN bowel regimen  - Miralax PRN   - Senna prn  - Trial of Nutrisource Fiber and monitor-3 packets daily per nutrition recs  - Stool enteric bacteria and viral PCR panel ordered but not yet collected, will follow up  - Consider loperamide if stool pathogen testing negative    #Cholelithiasis with gallbladder wall thickening, resolved  During admission patient found to have up trending LFTs and fever in setting of GB wall thickening on CT abdomen/pelvis, now resolved. MRCP completed showing borderline dilated CBD up to 1.1 cm, no significant intrahepatic biliary dilation, no evidence of choledocholithiasis, no evidence of acute cholecystitis.    Renal/Fluids/Electrolytes:  #ESRD on iHD (M,W,F)  History of ESRD 2/2 Tacrolimus toxicity on HD (MWF). With soft blood pressures, placed RIJ (6/20/2024) and started CRRT (6/20/2024). US of AVF 7/5: arterial inflow patent without inflow stenosis, normal diameter of anastamosis, venous outflow elevated velocity at subclavian- suggestive of recurrent stenosis in venous outflow (area of prior angioplasty in March). IR consulted who noted that IR fistulogram not required at that time, and was able to run iHD on 7/6 off her fistula. Currently having some swelling in same  arm that fistula is placed in, repeat US pending.   - Nephrology consulted and following for ESRD  - will follow recommendations for need of HD  - Renally-dosed medications  - BMP daily  - follow up US of fistula to determine if trialysis line can be removed      Endocrine:  #Risk for hyperglycemia with high dose steroids  Stress dose steroids discontinued, resume PTA prednisone 5 mg daily. Blood sugars have remained in appropriate range.  - NPH 8U BID  -CTM     ID:  #HAP, Stenotrophomonas maltophilia, Enterococcus faecalis, Aspergillus, and Candida guilliermondi complex  Presented to the ED on 6/18/2024 with SOB and hypercapnic respiratory failure. Found on bronchoscopy (6/19) to have RML obstruction by narrowing bronchus intermedius as well as copious mucopurulent secretions/mucus plugging. Previously treated for Stenotrophonomas/aspergillus pneumonia in 11/2023 with subsequent sputum culture (2/2024) negative for both Stenotrophonomas/Aspergillus. Etiology likely repeat Stenotrophomonas infection vs opportunistic infection from colonized microbe.   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    - Continue posaconazole 300 mg/day    - Complete 14 days of micafungin 150 mg/day today (6/25-7/9)- discontinued   - Continue VGCV prophylaxis  - Weekly CMV VL (Tuesdays, next 7/9), last was 39 (7/2)    - Azithromycin per pulmonary (holding due to QTC, but will follow up)  - TMP/SMX SS daily for PJP  PPx (for life given h/o PJP)  - Awaiting susceptibilities on Aspergillus per transplant ID  - Present antibiotics   - Continue posaconazole 300 mg/day per pulm transplant (6/25 to present) for candida guillermondii and asergillus ochraceus on prior BAL  - Past antibiotics   - s/p micafungin 150 mg/day (6/25-7/9)  - s/p IV minocycline 100mg BD x 14 days total (6/20-7/5)- for stenotrophamonas  - PO linezolid 600 mg BID x 10 days (6/25-7/5)- for VRE in urine and BAL cultures  - S/p ceftazidime (6/25-6/28)    - S/p vancomycin (6/18-6/20)   - S/p zosyn (6/18-21)  - S/p Daptomycin (6/21-6/23)  - S/p Meropenem (6/21-6/24)  - S/p Levofloxacin (6/21-6/23)    - BAL fluid (7/3): pink, hazy, cell counts normal range, fluid sent for viral, bacterial and fungal cultures negative to date, cytology negative for malignancy and organisms; preliminary AFB negative      #Pyuria, Enterococcus faecium VRE and  ESBL E. Coli   Asymptomatic. With progressive IV pressor needs, obtained broad infectious workup which demonstrated UCx (6/19/2024) with Enterococcus faecium VR and ESBL E. Coli. S/p Daptomycin (6/21-6/23) and Meropenem (6/21-6/24).    #Hx Aspergillus PNA (11/2023)  #Hx PJP PNA (1/2021)  #Hx CMV viremia, recurrent  #Hx EBV viremia  - Transplant ID consult, appreciate recs  PTA posaconazole (Treatment for hx of aspergillus PNA)  PTA azithromycin 250mg qd (CLAD ppx) (holding)  PTA bactrim (PJP ppx)  -CMV PVR 7/9- negative     Hematology:    #Acute on Chronic Normocytic Anemia, stable  #Thrombocytopenia, chronic  History of chronic anemia in the setting of kidney disease (baseline Hgb 10-11). Upon admission, Hgb 9. May be bone marrow suppression in the setting of chronic illness; potential contribution by blood loss with CRRT filter changes (~150-200c). Peripheral smear (6/18/2024) without evidence of schistocytes. Hemoglobin low today at 7.1, no signs of active bleeding.   - Continue to monitor CBC in AM  - monitor if need to hold  DVT prophylaxis in the AM  - Per nephrology, 4000 units epoetin daily  - Hgb improved this morning (7.8) after transfusion 1 unit pRBC yesterday  - PLTs stable    Musculoskeletal:  - PT / OT consult     Skin:  - Sacral Blanching- off load with frequent turns in bed    General Cares/Prophylaxis:    DVT Prophylaxis: subcutaneous heparin    GI Prophylaxis: PPI  Restraints: None  Family Communication:  to be updated by phone  Code Status: Full Code     Lines/tubes/drains:  - PIV x2, midline  - RIJ- remove when able from nephrology's perspective  - Tracheostomy  - PEG/J    Disposition:  - Medical ICU  - Defer discharge to LTACH pending steroid taper plan per transplant pulm    Patient seen and findings/plan discussed with medical ICU staff, Dr. Orozco.    Jailyn Lou MD  Internal Medicine Resident, PGY-2      Clinically Significant Risk Factors        # Hypokalemia: Lowest K = 3.3 mmol/L in last 2 days, will replace as needed       # Hypoalbuminemia: Lowest albumin = 2.2 g/dL at 6/21/2024  4:26 PM, will monitor as appropriate   # Thrombocytopenia: Lowest platelets = 63 in last 2 days, will monitor for bleeding                 #Precipitous drop in Hgb/Hct: Lowest Hgb this hospitalization: 6.8 g/dL. Will continue to monitor and treat/transfuse as appropriate.      # Moderate Malnutrition: based on nutrition assessment    # Financial/Environmental Concerns: none                ====================================  INTERVAL HISTORY:   No acute events overnight. Patient denies pain and reports sleeping well overnight. Left forearm does still appear edematous although edema in hand has improved. Still has trialysis line in place due to concern for fistula and bleeding/swelling in forearm.      OBJECTIVE:   1. VITAL SIGNS:   Temp:  [98.1  F (36.7  C)-98.6  F (37  C)] 98.2  F (36.8  C)  Pulse:  [] 75  Resp:  [18-25] 19  BP: ()/(58-97) 114/59  FiO2 (%):  [3 %-30 %] 30 %  SpO2:  [97 %-100 %] 100 %  Vent  Mode: CMV/AC  (Continuous Mandatory Ventilation/ Assist Control)  FiO2 (%): 30 %  Resp Rate (Set): 18 breaths/min  Tidal Volume (Set, mL): 320 mL  PEEP (cm H2O): 5 cmH2O  Pressure Support (cm H2O): 10 cmH2O  Resp: 19    2. INTAKE/ OUTPUT:   I/O last 3 completed shifts:  In: 1345 [NG/GT:655]  Out: 1200 [Other:1200]    3. PHYSICAL EXAMINATION:  General: Sitting up in bed, NAD   HEENT: Atraumatic, moist mucous membranes, trach in place c/d/I.  Pulm/Resp: Ongoing coarse crackles diffusely throughout, unchanged from prior exam. Non-labored breathing on vent support.   CV: RRR, no m/r/g. No peripheral edema  Abdomen: Soft, non-distended, non-tender, bowel sounds present. PEG/J in place c/d/I.  Ext: Trace bilateral LE edema, pulses 2+ radial, pedal  Incisions/Skin: No rashes or lesions  Neuro: Awake and alert x 3, follows commands and nods and shakes head to questions, moving all extremities equally    4. LABS:   Arterial Blood Gases   No lab results found in last 7 days.    Complete Blood Count   Recent Labs   Lab 07/12/24  0504 07/11/24  0518 07/10/24  0416 07/09/24  0820   WBC 2.5* 2.3* 2.2* 3.1*   HGB 7.2* 7.3* 7.5* 8.0*   PLT 97* 63* 47* 48*     Basic Metabolic Panel  Recent Labs   Lab 07/12/24  0909 07/12/24  0504 07/12/24  0503 07/11/24  2257 07/11/24  0831 07/11/24  0518 07/10/24  0418 07/10/24  0416 07/09/24 2012 07/09/24 2007   NA  --  134*  --   --   --  131*  --  130*  --  133*   POTASSIUM  --  3.3*  --   --   --  3.8  --  3.3*  --  3.6   CHLORIDE  --  97*  --   --   --  98  --  98  --  100   CO2  --  27  --   --   --  25  --  26  --  25   BUN  --  38.5*  --   --   --  57.5*  --  38.1*  --  30.6*   CR  --  1.84*  --   --   --  2.52*  --  1.80*  --  1.46*   * 159* 149* 155*   < > 122*   < > 150*   < > 182*    < > = values in this interval not displayed.     Liver Function Tests  Recent Labs   Lab 07/11/24  0518   AST 30   ALT 13   ALKPHOS 282*   BILITOTAL 0.4   ALBUMIN 2.6*       Coagulation Profile  No  lab results found in last 7 days.      5. RADIOLOGY:   No results found for this or any previous visit (from the past 24 hour(s)).

## 2024-07-12 NOTE — PLAN OF CARE
ICU End of Shift Summary. See flowsheets for vital signs and detailed assessment.    Changes this shift: Patient reports sleeping well. Do not disturb orders in place from 2200 to 0600. Refusing staff assisted turns but turns self in bed and education provided. MAP >65 throughout shift. SR/Sinus Tach throughout shift with PACs. CMV settings overnight with FiO2 of 30%, PEEP of 5, Rate of 18, and TV of 320. Minimal secretions through trach. Denies pain. BM x2.     Plan:  Continue Pressure support trials BID. Continue working with PT and OT. Continue Steroid burst and taper per pulm transplant. Discharge to LTACH later next week after steroid taper.     Goal Outcome Evaluation:      Plan of Care Reviewed With: patient    Overall Patient Progress: no changeOverall Patient Progress: no change    Outcome Evaluation: see note

## 2024-07-12 NOTE — PROGRESS NOTES
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 Nephrology consulted for management of HD while admitted.      Interval History :   Mrs Blue is planned had HD yesterday, we were monitoring for long bleeding time as she still had some after IR plasty earlier this week.  She did not have this but did have hand/arm swelling distal to fistula so stopped run after 2.5h. Rechecking US again since we have had issues x2 with fistula but may simply be due to difficult to access fistula at baseline.      Plan for HD tomorrow with usual Rx, will ensure we are careful in cannulating her fistula, pt and her  do endorse prolonged (~20-30 minutes at times) bleeding times at baseline.      Assessment & Recommendations:   ESRD-ESKD: pt dialyzes MWF at [facility] (ph [phone], f [phone]) with Dr. Glasgow. Run time: 3.5 hrs. EDW 52.5 kg. Access: L AVF. +heparin 1,500u bolus.                  -Still has temp line, we can likely pull but will review US today.      -Holding on run today, plan for tomorrow on TTS for now.                  -No need for new dialysis consent, continuing long term HD for ESRD.                -Appreciate IR doing plasty yesterday to fistula, will run today and pull temp line assuming no issues.      Outpt Rx:       Volume-Wt as low as 49kg during her course (although may have been dry at the time as she needed some neosynephrine), EDW likely a bit lower with ICU stay ~50-51kg.      Pulm-Admitted with hypercapnic resp failure, treated for PNA. Trached, progressing well.       Electrolytes-Stable.     BMD-No acute issues.  "     Anemia-Hgb 7.2, last PRBC's 6/26 on venofer 50mg weekly but will hold while treating for infection. On Mircera 60mcg t3hemta, will cover with short term 4k Epo with runs while admitted when stable enough for HD.       Nutrition-Novasource renal      Time spent: 50 minutes on this date of encounter for chart review, physical exam, medical decision making and co-ordination of care.      Seen and discussed with Dr Goodman     Recommendations were communicated to primary team via verbal communication.     ADRIÁN Lo CNS  Clinical Nurse Specialist  [phone]    Review of Systems:   I reviewed the following systems:  Gen: No fevers or chills  CV: No CP at rest  Resp: No SOB at rest  GI: No N/V    Physical Exam:   I/O last 3 completed shifts:  In: 1345 [NG/GT:655]  Out: 1200 [Other:1200]   /59   Pulse 75   Temp 98.2  F (36.8  C) (Oral)   Resp 19   Ht 1.6 m (5' 3\")   Wt 51.4 kg (113 lb 5.1 oz)   LMP 06/07/2014 (Exact Date)   SpO2 100%   BMI 20.07 kg/m       GENERAL APPEARANCE: Vent via trach   EYES: No scleral icterus  Pulmonary: Stable on vent  CV: Regular rhythm, normal rate   - Edema none  GI: soft, nontender, normal bowel sounds  MS: no evidence of inflammation in joints, no muscle tenderness  : No Basilio  SKIN: no rash, warm, dry  NEURO: No focal deficits    Labs:   All labs reviewed by me  Electrolytes/Renal -   Recent Labs   Lab Test 07/12/24  0909 07/12/24  0504 07/12/24  0503 07/11/24  0831 07/11/24  0518 07/10/24  0418 07/10/24  0416 07/09/24 2012 07/09/24  2007 07/09/24  0415 07/09/24  0408 07/08/24  0809 07/08/24  0443   NA  --  134*  --   --  131*  --  130*  --  133*  --  129*  --  130*   POTASSIUM  --  3.3*  --   --  3.8  --  3.3*  --  3.6  --  3.4  --  3.5   CHLORIDE  --  97*  --   --  98  --  98  --  100  --  97*  --  97*   CO2  --  27  --   --  25  --  26  --  25  --  21*  --  23   BUN  --  38.5*  --   --  57.5*  --  38.1*  --  30.6*  --  80.2*  --  63.0*   CR  --  1.84*  -- "   --  2.52*  --  1.80*  --  1.46*  --  3.15*  --  2.62*   * 159* 149*   < > 122*   < > 150*   < > 182*   < > 129*   < > 136*   CHELSEY  --  8.3*  --   --  8.6*  --  8.4*  --  8.8  --  8.3*  --  8.1*   MAG  --  1.8  --   --   --   --  1.9  --  1.6*  --  1.9  --  1.9   PHOS  --  3.1  --   --   --   --   --   --   --   --  5.3*  --  5.0*    < > = values in this interval not displayed.       CBC -   Recent Labs   Lab Test 07/12/24  0504 07/11/24  0518 07/10/24  0416   WBC 2.5* 2.3* 2.2*   HGB 7.2* 7.3* 7.5*   PLT 97* 63* 47*       LFTs -   Recent Labs   Lab Test 07/11/24  0518 07/02/24  0359 07/01/24  1637 07/01/24  0346   ALKPHOS 282* 176*  --  173*   BILITOTAL 0.4 0.6  --  0.6   ALT 13 37  --  36   AST 30 26  --  25   PROTTOTAL 5.1* 5.4*  --  5.0*   ALBUMIN 2.6* 2.8* 2.7* 2.6*       Iron Panel -   Recent Labs   Lab Test 02/03/21  0415 12/13/18  1033 08/01/18  0921   IRON 51 16* 93   IRONSAT 36 7* 37   YOLA  --  302* 571*           Current Medications:  Current Facility-Administered Medications   Medication Dose Route Frequency Provider Last Rate Last Admin    acetaminophen (TYLENOL) tablet 975 mg  975 mg Oral or Feeding Tube Q8H Jailyn Lou MD   975 mg at 07/12/24 0615    acetylcysteine (MUCOMYST) 10 % nebulizer solution 4 mL  4 mL Inhalation BID Velez Reyes, German, MD   4 mL at 07/12/24 0800    [Held by provider] azithromycin (ZITHROMAX) tablet 250 mg  250 mg Oral or Feeding Tube Daily Velez Reyes, German, MD   250 mg at 07/09/24 0828    [Held by provider] calcium carbonate (TUMS) chewable tablet 500 mg  500 mg Oral Once per day on Sunday Tuesday Thursday Saturday Josesito Pratt MD   500 mg at 07/09/24 0834    chlorhexidine (PERIDEX) 0.12 % solution 15 mL  15 mL Mouth/Throat Q12H Chio Cortez MD   15 mL at 07/12/24 0851    fiber modular (BANATROL TF) packet 1 packet  1 packet Per Feeding Tube Q8H Awa Watt MD   1 packet at 07/11/24 1146    heparin ANTICOAGULANT injection 5,000  Units  5,000 Units Subcutaneous Q8H Jailyn Lou MD   5,000 Units at 07/12/24 0615    insulin aspart (NovoLOG) injection (RAPID ACTING)  1-12 Units Subcutaneous Q4H Edin Davis MD   1 Units at 07/12/24 0505    insulin NPH injection 8 Units  8 Units Subcutaneous BID Velez Reyes, German, MD   8 Units at 07/12/24 0852    levalbuterol (XOPENEX) neb solution 0.63 mg  0.63 mg Nebulization 4x Daily Gareth Mosquera MD   0.63 mg at 07/12/24 0800    [Held by provider] magnesium chloride CR tablet 1,070 mg  1,070 mg Oral Once per day on Sunday Tuesday Thursday Saturday Josesito Pratt MD        metoprolol tartrate (LOPRESSOR) tablet 25 mg  25 mg Oral BID Velez Reyes, German, MD   25 mg at 07/12/24 0615    midodrine (PROAMATINE) tablet 10 mg  10 mg Oral or Feeding Tube Q8H BMT Jailyn Lou MD   10 mg at 07/12/24 0615    montelukast (SINGULAIR) tablet 10 mg  10 mg Oral At Bedtime Velez Reyes, German, MD   10 mg at 07/11/24 2100    multivitamin RENAL (RENAVITE RX/NEPHROVITE) tablet 1 tablet  1 tablet Oral or Feeding Tube Daily Awa Watt MD   1 tablet at 07/12/24 0851    pantoprazole (PROTONIX) 2 mg/mL suspension 40 mg  40 mg Per Feeding Tube Daily Randi James MD   40 mg at 07/11/24 2008    posaconazole (NOXAFIL) DR tablet TBEC 300 mg  300 mg Per Feeding Tube Daily Josesito Pratt MD   300 mg at 07/11/24 1404    [Held by provider] predniSONE (DELTASONE) tablet 5 mg  5 mg Oral Daily Rossana Sarabia APRN CNP   5 mg at 07/11/24 0856    predniSONE (DELTASONE) tablet 50 mg  50 mg Oral Daily Rufina Huff, CNP   50 mg at 07/12/24 0851    protein modular (PROSOURCE TF20) packet 1 packet  1 packet Per Feeding Tube Daily Edin Davis MD   1 packet at 07/12/24 0852    QUEtiapine (SEROquel) tablet 50 mg  50 mg Oral or Feeding Tube At Bedtime Kajal Chong APRN CNP   50 mg at 07/11/24 2100    ramelteon (ROZEREM) tablet 8 mg  8 mg Oral At Bedtime Velez Reyes,  MD Ismael   8 mg at 07/11/24 2008    sodium chloride (NEBUSAL) 3 % neb solution 3 mL  3 mL Nebulization BID Velez Reyes, German, MD   3 mL at 07/11/24 2046    sodium chloride (PF) 0.9% PF flush 10 mL  10 mL Intracatheter Q8H Kajal Chong, APRN CNP   10 mL at 07/12/24 0910    sodium chloride (PF) 0.9% PF flush 10 mL  10 mL Intracatheter Q8H Kajal Chong, APRN CNP   10 mL at 07/12/24 0853    sulfamethoxazole-trimethoprim (BACTRIM) 400-80 MG per tablet 1 tablet  1 tablet Oral or Feeding Tube Once per day on Tuesday Thursday Saturday Randi James MD   1 tablet at 07/11/24 2008    tacrolimus (GENERIC) suspension 0.8 mg  0.8 mg Oral or Feeding Tube BID IS Yonny Orona MD   0.8 mg at 07/12/24 0851    traZODone (DESYREL) half-tab 25 mg  25 mg Per Feeding Tube At Bedtime Jailyn Lou MD   25 mg at 07/11/24 2100    valGANciclovir (VALCYTE) solution 450 mg  450 mg Per Feeding Tube Once per day on Tuesday Saturday Randi James MD   450 mg at 07/09/24 2015    Vitamin D3 (CHOLECALCIFEROL) tablet 50 mcg  50 mcg Oral Once per day on Monday Wednesday Friday Velez Reyes, German, MD   50 mcg at 07/10/24 0826     Current Facility-Administered Medications   Medication Dose Route Frequency Provider Last Rate Last Admin    dextrose 10% infusion   Intravenous Continuous PRN Velez Reyes, German, MD   Stopped at 07/03/24 1600    dextrose 10% infusion   Intravenous Continuous PRN Awa Watt MD   Stopped at 07/05/24 1400

## 2024-07-12 NOTE — PROGRESS NOTES
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  The patient was admitted on 24 for progressive hypoxia with dyspnea and worsening hypercapnia in ED requiring intubation likely d/t post-obstructive PNA 2/2 right bronchus stenosis.  Bronch confirming severe stenosis of right anastomosis with extensive RLL plugging.  IP bronch () with laser tissue debulking RMB stenosis, airway balloon dilation of RMB and BI, and placement of stent RMB to BI and bifurcating stent in RUL.  S/p volume resuscitation and transition to CRRT  for hypotension, stopped  and iHD resumed .  Increased agitation/delirium on  with increasing desaturation led to need for increased sedation.  Recurrence of paroxysmal afib with RVR first noted .  S/p trach with ENT on 7/3 and GJ tube with IR on .  Now with persistent respiratory failure with minimal progress with PST.  GG changes on CT scan so treating empirically for possible immune/inflammatory process.  Discharge to LTACH pending plan for steroid taper.     Today's recommendations:  - Continue steroid burst with prednisone 1 mg/kg daily, taper TBD based on clinical response, defer discharge to LTACH pending steroid taper plan  - Repeat chest CT non-contrast on   - PTA  azithromycin held for prolonged QTc (improving)  - Tacrolimus level today subtherapeutic, dose increased, repeat level ordered 7/14 to trend (ordered)  - Prednisone chronic dosing on hold with steroid burst as above  - Posaconazole continued per transplant ID with future f/u  - CMV weekly monitoring (qTuesday) and VGCV ppx with increased steroid courses, duration TBD     Septic shock:  Acute on chronic hypoxic/hypercapneic respiratory failure:  Post-obstructive multi-lobar PNA:  Right bronchial stenosis with RML collapse: Admitted with 2 weeks of progressive hypoxia, dyspnea, congested cough, and fatigue.  Chronic hypoxia with 2L NC, increased from 3 to 4L over this time with acute decompensation on day of admission associated with hypercapnia.  VBG 7/17/85 in ED s/p intubation.  IP bronch 2/15 s/p tissue debulking without stent placement.  Negative comprehension ID workup on admission.  Procal mildly elevated at 0.24 initially (then elevated to 1.77 6/20), LA normal, but febrile to 100.7.  TTE on admission grossly normal.  CT with RUL/RLL consolidative opacities, RML collapse, and narrowing of BI and distal RLL bronchus.  Bronch per MICU (6/19) with severe stenosis starting at right anastomosis, inspection with smaller scope revealing for extensive RLL mucous plugging.  S/p bronch per IP (6/20) with laser tissue debulking RMB stenosis, airway balloon dilation of RMB and BI, and placement of stent RMB to BI and bifurcating stent in RUL.  Respiratory cultures with Steno, VSE, VRE, Aspergillus ochraceus, and Elidia guilliermondii.  Chest CT (6/29) with multifocal panlobar opacities and small bibasilar pleural effusions.  S/p trach with ENT on 7/3 and GJ tube with IR on 7/5.  S/p stress dose steroids 6/21-7/5.  Chest CT (7/8) with waxing and waning mostly improved right lung consolidation  and new diffuse GGO >BLL, slight improvement in peribronchiolar consolidation in LLL, and grossly patent stents, ectatic  descending thoracic aorta approximately 4 cm, and decreased pleural thickening lung bases.  CXR (7/11) with improved RLL perihilar opacities.  Continues to only tolerate brief PST.  - ABX: none currently; s/p linezolid (6/25-7/5), minocycline (6/20-7/5, Steno), ceftazidime (6/25-6/28), meropenem (6/21-6/24), daptomycin (6/21-6/24), levofloxacin (6/21-6/23), Zosyn (6/18-6/21), azithromycin 500 mg daily (6/18-6/20), and vancomycin (6/18)   - Steroid burst today with prednisone 1 mg/kg daily (7/11), taper TBD based on clinical response, defer discharge to LTACH pending steroid taper plan  - Nebs: levalbuterol QID, Mucomyst 10% BID alternating with 3% HTS (6/22), will need to be on NS nebs BID upon discharge per IP  - Vent management and weaning per MICU  - Repeat chest CT non-contrast on 7/17     S/p bilateral sequential lung transplant (BSLT) for ILD:   Progressive CLAD: Most recent OP visit in February.  DSA negative 7/3 (last positive noted 2018).  Repeat ImmuKnow (6/19) 87, very low but IST adjustment deferred per Dr. Graham given acuity and steroids (after ImmuKnow level).  Repeat Prospera remains high at 2.3 on 6/19, may be artificially elevated with current infection, but also notably high at 2.42 on 2/14 and 2.26 on 7/3.  ImmuKnow (7/8) remains low at 89, defer IST adjustment at this time.  - PTA Singulair  - PTA Breo inhaler on hold with trach  - PTA azithromycin held 7/10 for prolonged QTc, remains elevated but improving     Immunosuppression:  On 2-drug IST since November d/t leukopenia and recurrent infections  - Tacrolimus 0.8 mg BID.  Goal level 8-10.  Level today 5.9 (9.5h level), dose increased to 1 mg BID.  Repeat level ordered 7/14 to trend.  - Prednisone 5 mg daily (resumed 7/6 following stress dose steroids), on hold again with steroid burst as above     Prophylaxis:   - Bactrim for PJP ppx (renally dosed)     ID: H/o Actinomyces (10/17/23) and recurrent Steno (8/17/23 and 11/1/23).  S/p ABX as  above.     Aspergillus ochraceus:  H/o Aspergillus empyema: S/p empyema drainage and ampho B instillation.  Previously on voriconazole, transitioned to posaconazole and managed by transplant ID as OP with last visit 3/13.  Calcified fungal elements remain with additional recurrent effusion (likely associated with fluid disturbances r/t iHD), but surgical intervention previously deferred d/t risk.  Posaconazole level 2.5 on 6/19.  Susceptibility testing done on fungal bronch culture 6/19 per transplant ID.  S/p micafungin 150 mg daily (6/25-7/9) per transplant ID.  - Posaconazole 300 mg daily chronically per transplant ID (signed off 7/9) with plan for transplant ID follow up ~6-8 weeks      H/o CMV viremia: Recurrent CMV viremia historically.  VGCV ppx most recently stopped 4/12.  Recent CMV low positive at 46 (6/12), <35 (6/18), 39 (6/25, 7/2) and now negative 7/9.  - CMV weekly monitoring (qTuesday) and VGCV ppx (6/24, renally dosed) with increased steroid courses, duration TBD     EBV viremia: S/p rituximab 12/13/23 x1 dose.  Most recent EBV negative 6/18.     Dilated CBD:  Suspected acute cholecystitis: MRCP (6/29) with CBD dilation with stones and cholelithiasis without cholecystitis.  GI signed off 6/30, recommending general surgery consult if RUQ pain persists.     ESRD on iHD: Kidney evaluation started as OP.  On qMWF iHD schedule, no missed sessions.  Transitioned to CRRT on 6/20, stopped 7/2 and iHD resumed 7/4.  On scheduled midodrine given hypotension.  Management per renal and MICU with fluid removal as able.      Pancytopenia: WBC 2.2-3.2 since 7/7.  Platelets down to 45 on 7/9.  On VGCV as above.  Also intermittently requiring pRBC.  Peripheral smear (7/9) with moderate normochromic, normocytic anemia with no increase in erythrocyte regeneration, slight leukopenia, marked thrombocytopenia with normal platelet morphology, and no overt evidence of dysplasia.  - CBC with differential daily, consider  "G-CSF if ANC </=0.8     VRE urine culture: Noted 6/19, >100k GNB and VRE faecium, ABX as above with management per transplant ID and MICU.     We appreciate the excellent care provided by the MICU team.  Recommendations communicated via this note.  Will continue to follow along closely, please do not hesitate to call with any questions or concerns.     Patient discussed with Dr. Macias.    Hina Huff, DNP, APRN, CNP  Inpatient Nurse Practitioner  Pulmonary CF/Transplant     Subjective & Interval History:     Remains intubated, PST x2 yesterday for 5 minutes and then 40 minutes.  Minimal secretions via trach.  HD run yesterday for 1.2L removed.    Review of Systems:     ROS as above, otherwise significantly limited due to intubation and sedation.    Physical Exam:     All notes, images, and labs from past 24 hours (at minimum) were reviewed.    Vital signs:  Temp: 98.1  F (36.7  C) Temp src: Axillary BP: 114/59 Pulse: 76   Resp: 19 SpO2: 100 % O2 Device: Mechanical Ventilator Oxygen Delivery: 6 LPM Height: 160 cm (5' 3\") Weight: 51.4 kg (113 lb 5.1 oz)  I/O:   Intake/Output Summary (Last 24 hours) at 7/12/2024 0800  Last data filed at 7/12/2024 0700  Gross per 24 hour   Intake 1159 ml   Output 1200 ml   Net -41 ml     Constitutional: Lying supine in bed, in no apparent distress.   HEENT: Eyes with pink conjunctivae, anicteric.  Oral mucosa moist without lesions.  Trach cdi.  PULM: Good air flow bilaterally.  No crackles, no rhonchi, no wheezes.  Non-labored breathing on full vent support.  CV: Normal S1 and S2.  RRR.  No murmur, gallop, or rub.  LUE edema.   ABD: NABS, soft, nontender, nondistended.   PEG/J tube site cdi.  MSK: Moves all extremities.  + muscle wasting.   NEURO: Lethargic, not conversant.   SKIN: Warm, dry, fragile.  No rash on limited exam.   PSYCH: Calm.     Data:     LABS    CMP:   Recent Labs   Lab 07/12/24  0504 07/12/24  0503 07/11/24  2257 07/11/24 2016 07/11/24  0831 07/11/24  0518 " 07/10/24  0418 07/10/24  0416 07/09/24  2012 07/09/24  2007 07/09/24  0415 07/09/24  0408 07/08/24  0809 07/08/24  0443 07/07/24  0742 07/07/24  0430   *  --   --   --   --  131*  --  130*  --  133*  --  129*  --  130*  --  132*   POTASSIUM 3.3*  --   --   --   --  3.8  --  3.3*  --  3.6  --  3.4  --  3.5  --  3.5   CHLORIDE 97*  --   --   --   --  98  --  98  --  100  --  97*  --  97*  --  99   CO2 27  --   --   --   --  25  --  26  --  25  --  21*  --  23  --  24   ANIONGAP 10  --   --   --   --  8  --  6*  --  8  --  11  --  10  --  9   * 149* 155* 216*   < > 122*   < > 150*   < > 182*   < > 129*   < > 136*   < > 115*   BUN 38.5*  --   --   --   --  57.5*  --  38.1*  --  30.6*  --  80.2*  --  63.0*  --  41.7*   CR 1.84*  --   --   --   --  2.52*  --  1.80*  --  1.46*  --  3.15*  --  2.62*  --  1.91*   GFRESTIMATED 31*  --   --   --   --  21*  --  32*  --  41*  --  16*  --  20*  --  29*   CHELSEY 8.3*  --   --   --   --  8.6*  --  8.4*  --  8.8  --  8.3*  --  8.1*  --  8.0*   MAG 1.8  --   --   --   --   --   --  1.9  --  1.6*  --  1.9  --  1.9  --  1.8   PHOS 3.1  --   --   --   --   --   --   --   --   --   --  5.3*  --  5.0*  --  3.9   PROTTOTAL  --   --   --   --   --  5.1*  --   --   --   --   --   --   --   --   --   --    ALBUMIN  --   --   --   --   --  2.6*  --   --   --   --   --   --   --   --   --   --    BILITOTAL  --   --   --   --   --  0.4  --   --   --   --   --   --   --   --   --   --    ALKPHOS  --   --   --   --   --  282*  --   --   --   --   --   --   --   --   --   --    AST  --   --   --   --   --  30  --   --   --   --   --   --   --   --   --   --    ALT  --   --   --   --   --  13  --   --   --   --   --   --   --   --   --   --     < > = values in this interval not displayed.     CBC:   Recent Labs   Lab 07/12/24  0504 07/11/24  0518 07/10/24  0416 07/09/24  0820   WBC 2.5* 2.3* 2.2* 3.1*   RBC 2.23* 2.26* 2.31* 2.45*   HGB 7.2* 7.3* 7.5* 8.0*   HCT 22.5* 22.3* 22.0* 23.3*  "  * 99 95 95   MCH 32.3 32.3 32.5 32.7   MCHC 32.0 32.7 34.1 34.3   RDW 22.0* 20.9* 18.9* 18.6*   PLT 97* 63* 47* 48*       INR: No lab results found in last 7 days.    Glucose:   Recent Labs   Lab 07/12/24  0504 07/12/24  0503 07/11/24  2257 07/11/24 2016 07/11/24  1610 07/11/24  1148   * 149* 155* 216* 193* 104*       Blood Gas:   Recent Labs   Lab 07/07/24  0748   PHV 7.35   PCO2V 45   PO2V 59*   HCO3V 25   LASHAUN -0.5   O2PER 30       Culture Data No results for input(s): \"CULT\" in the last 168 hours.    Virology Data:   Lab Results   Component Value Date    FLUAH1 Not Detected 06/18/2024    FLUAH3 Not Detected 06/18/2024    RR5363 Not Detected 06/18/2024    IFLUB Not Detected 06/18/2024    RSVA Not Detected 06/18/2024    RSVB Not Detected 06/18/2024    PIV1 Not Detected 06/18/2024    PIV2 Not Detected 06/18/2024    PIV3 Not Detected 06/18/2024    HMPV Not Detected 06/18/2024    HRVS Negative 01/24/2021    ADVBE Negative 01/24/2021    ADVC Negative 01/24/2021    ADVC Negative 12/23/2020    ADVC Negative 10/07/2019       Historical CMV results (last 3 of prior testing):  Lab Results   Component Value Date    CMVQNT Not Detected 07/09/2024    CMVQNT <35 (A) 06/18/2024    CMVQNT Not Detected 03/05/2024     Lab Results   Component Value Date    CMVLOG 1.6 07/02/2024    CMVLOG 1.6 06/25/2024    CMVLOG <1.5 06/18/2024       Urine Studies    Recent Labs   Lab Test 06/19/24  1214 01/24/21  1729   URINEPH 6.5 5.0   NITRITE Negative Negative   LEUKEST Large* Moderate*   WBCU >182* 34*       Most Recent [facility] Pulmonary Function Testing (FVC/FEV1 only)  FVC-Pre   Date Value Ref Range Status   02/14/2024 0.85 L    12/19/2023 1.04 L    11/21/2023 0.85 L    10/24/2023 0.96 L      FVC-%Pred-Pre   Date Value Ref Range Status   02/14/2024 29 %    12/19/2023 36 %    11/21/2023 29 %    10/24/2023 33 %      FEV1-Pre   Date Value Ref Range Status   02/14/2024 0.80 L    12/19/2023 0.89 L    11/21/2023 0.78 L    10/24/2023 " 0.87 L      FEV1-%Pred-Pre   Date Value Ref Range Status   02/14/2024 34 %    12/19/2023 38 %    11/21/2023 33 %    10/24/2023 37 %        IMAGING    Recent Results (from the past 48 hour(s))   XR Chest Port 1 View    Narrative    XR CHEST PORT 1 VIEW  7/11/2024 6:33 AM     HISTORY:  interval follow up, lung transplant       COMPARISON:  7/8/2024    TECHNIQUE: Portable, semiupright, frontal projection radiograph of the  chest.    FINDINGS:   Stable position of tracheostomy tube and right IJ central venous  catheter. Intact sternotomy wires. Stable bronchial stent on the  right.    Stable cardiomediastinal silhouette. Increased hazy opacity throughout  the left lung. Stable mixed interstitial and airspace opacities on the  left greater than right. No pleural effusions. No pneumothorax.        Impression    IMPRESSION:  Stable support devices. Increased opacities throughout the left mid  and lower lung fields, possibly atelectasis versus edema.    I have personally reviewed the examination and initial interpretation  and I agree with the findings.    CARMELINA VILLALOBOS MD         SYSTEM ID:  [id number]

## 2024-07-12 NOTE — PROGRESS NOTES
SPIRITUAL HEALTH SERVICES   [facility] ([place]) 4C  Reason for Visit: length of stay     Summary and Recommendations -  Patient Kecia Blue and  Ranjan are setting incremental goals; Kecia is particularly focused on pressure supporting for longer each time. Overall, goals are life-extending.  Kecia has coping resources from health psychology that she showed me and planned to employ during pressure support.  Kecia draws strength from and finds meaning in life experience, family relationships, and personal Jehovah's witness spirituality.     Plan: Per patient/family request, chaplains will follow for spiritual/emotional support while Kecia is admitted.     Heather Gutierrez M.Div., [facility]  Staff   Pager [phone]        SHS available 24/7 for emergent requests/referrals, either by paging the on-call  or by entering an ASAP/STAT consult in [employer], which will also page the on-call .     Assessment     Patient/Family Understanding of Illness and Goals of Care - Kecia and Ranjan talked about how long Kecia has been admitted this time, while continuing to name life extending goals and celebrate the progress she has made so far. Kecia is frustrated by how difficult (she attributes difficulty to her mental health and emotions) transition to pressure support has been this time.      Distress and Loss - Kecia and Ranjan are resilient, some challenges finding housing the works well for Ranjan this admission. They miss being home.     Strengths, Coping, and Resources - Brady told stories that illustrated family support and the meaning they find in relationships with mother, children, and grandchildren, in addition to their mutually supportive partnership.    Meaning, Beliefs, and Spirituality - Personal Jehovah's witness spirituality is part of Kecia's sense of meaning.

## 2024-07-13 NOTE — PROGRESS NOTES
ICU End of Shift Summary. See flowsheets for vital signs and detailed assessment.    Changes this shift: Pressure supported 1hr 10/5 this AM, tolerated with coaxing, anxious and intermittently tachypneic last 30min.  SATs and volumes good entire time.  Worked w/PT & OT up to chair and stood with walker.  US of fistula. 3x loose stools, continent, banatrol already scheduled.    Plan: Plans for iHD tomorrow.  Called RT per pt request for one final PS trial this evening, RT said they'd come after report.

## 2024-07-13 NOTE — PROGRESS NOTES
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  The patient was admitted on 24 for progressive hypoxia with dyspnea and worsening hypercapnia in ED requiring intubation likely d/t post-obstructive PNA 2/2 right bronchus stenosis.  Bronch confirming severe stenosis of right anastomosis with extensive RLL plugging.  IP bronch () with laser tissue debulking RMB stenosis, airway balloon dilation of RMB and BI, and placement of stent RMB to BI and bifurcating stent in RUL.  S/p volume resuscitation and transition to CRRT  for hypotension, stopped  and iHD resumed .  Increased agitation/delirium on  with increasing desaturation led to need for increased sedation.  Recurrence of paroxysmal afib with RVR first noted .  S/p trach with ENT on 7/3 and GJ tube with IR on .  Now with persistent respiratory failure with very slow progress with PST.  GG changes on CT scan so treating empirically for possible immune/inflammatory process with steroid burst/taper. Discharge to LTACH pending plan for steroid taper.     Recommendations:  - Continue steroid burst with prednisone 1 mg/kg, currently 50 mg daily, until CT on ; taper plan TBD imaging, but can start planning for LTACH discharge next Wed  - Chest CT  non-contrast on 7/17  - Ongoing PST BID per MICU  - Continue to hold PTA azithromycin for prolonged QTc  - Tacrolimus level ordered 7/14 to trend   - Posaconazole per transplant ID   - CMV weekly monitoring (qTuesday) and VGCV ppx with increased steroid courses, duration TBD     Septic shock:  Acute on chronic hypoxic/hypercapneic respiratory failure:  Post-obstructive multi-lobar PNA:  Right bronchial stenosis with RML collapse: Admitted with 2 weeks of progressive hypoxia, dyspnea, congested cough, and fatigue.  Chronic hypoxia with 2L NC, increased from 3 to 4L over this time with acute decompensation on day of admission associated with hypercapnia.  VBG 7/17/85 in ED s/p intubation.  IP bronch 2/15 s/p tissue debulking without stent placement.  Negative comprehension ID workup on admission.  Procal mildly elevated at 0.24 initially (then elevated to 1.77 6/20), LA normal, but febrile to 100.7.  TTE on admission grossly normal.  CT with RUL/RLL consolidative opacities, RML collapse, and narrowing of BI and distal RLL bronchus.  Bronch per MICU (6/19) with severe stenosis starting at right anastomosis, inspection with smaller scope revealing for extensive RLL mucous plugging.  S/p bronch per IP (6/20) with laser tissue debulking RMB stenosis, airway balloon dilation of RMB and BI, and placement of stent RMB to BI and bifurcating stent in RUL.  Respiratory cultures with Steno, VSE, VRE, Aspergillus ochraceus, and Elidia guilliermondii.  Chest CT (6/29) with multifocal panlobar opacities and small bibasilar pleural effusions.  S/p trach with ENT on 7/3 and GJ tube with IR on 7/5.  S/p stress dose steroids 6/21-7/5.  Chest CT (7/8) with waxing and waning mostly improved right lung consolidation  and new diffuse GGO >BLL, slight improvement in peribronchiolar consolidation in LLL, and grossly patent stents, ectatic descending thoracic aorta approximately 4 cm, and decreased pleural thickening lung bases.  CXR (7/11)  with improved RLL perihilar opacities. Continues to only tolerate brief PST, but felt her anxiety was better yesterday with a movie as a distraction.  - ABX: none currently; s/p linezolid (6/25-7/5), minocycline (6/20-7/5, Steno), ceftazidime (6/25-6/28), meropenem (6/21-6/24), daptomycin (6/21-6/24), levofloxacin (6/21-6/23), Zosyn (6/18-6/21), azithromycin 500 mg daily (6/18-6/20), and vancomycin (6/18)   - Steroid burst (7/11) with prednisone 1 mg/kg daily, taper TBD based on clinical response and imaging on 7/17; may start planning for Wed discharge  - Nebs: levalbuterol QID, Mucomyst 10% BID alternating with 3% HTS (6/22), will need to be on NS nebs BID upon discharge per IP  - Vent management and weaning per MICU  - Repeat chest CT non-contrast on 7/17     S/p bilateral sequential lung transplant (BSLT) for ILD:   Progressive CLAD: Most recent OP visit in February.  DSA negative 7/3 (last positive noted 2018).  Repeat ImmuKnow (6/19) 87, very low but IST adjustment deferred per Dr. Graham given acuity and steroids (after ImmuKnow level).  Repeat Prospera remains high at 2.26 on 7/3 possibly related to recent infection, now on steroids. ImmuKnow remains low at 89 on 7/8, defer IST adjustment at this time.  - PTA Singulair, Breo inhaler (on hold with trach), azithromycin (held 7/10 for prolonged QTc, remains elevated at 483)     Immunosuppression:  On 2-drug IST since November d/t leukopenia and recurrent infections  - Tacrolimus 0.8 mg BID.  Goal level 8-10.  Level sub therapeutic 7/12, so dose increased to 1 mg BID.  Repeat level ordered 7/14 to trend  - Prednisone 5 mg daily, on hold with steroid burst as above     Prophylaxis:   - Bactrim for PJP ppx (renally dosed)     ID: H/o Actinomyces (10/17/23) and recurrent Steno (8/17/23 and 11/1/23).  S/p ABX as above.     Aspergillus ochraceus:  H/o Aspergillus empyema: S/p empyema drainage and ampho B instillation.  Previously on voriconazole, transitioned to  posaconazole and managed by transplant ID as OP with last visit 3/13.  Calcified fungal elements remain with additional recurrent effusion (likely associated with fluid disturbances r/t iHD), but surgical intervention previously deferred d/t risk.  Posaconazole level 2.5 on 6/19.  Susceptibility testing done on fungal bronch culture 6/19 per transplant ID.  S/p micafungin 150 mg daily (6/25-7/9) per transplant ID.  - Posaconazole 300 mg daily chronically per transplant ID (signed off 7/9) with plan for transplant ID follow up ~6-8 weeks      H/o CMV viremia: Recurrent CMV viremia historically.  VGCV ppx most recently stopped 4/12.  Recent CMV low positive at 46 (6/12), <35 (6/18), 39 (6/25, 7/2) and now negative 7/9.  - On valcyte twice weekly while on steroid bust/taper  - CMV weekly monitoring (qTuesday) and VGCV ppx (6/24, renally dosed) with increased steroid courses, duration TBD     EBV viremia: S/p rituximab 12/13/23 x1 dose.  Most recent EBV negative 6/18.     Dilated CBD:  Suspected acute cholecystitis: MRCP (6/29) with CBD dilation with stones and cholelithiasis without cholecystitis.  GI signed off 6/30, recommending general surgery consult if RUQ pain recurs.     ESRD on iHD: Kidney evaluation started as OP.  On qMWF iHD schedule, no missed sessions.  Transitioned to CRRT on 6/20, stopped 7/2 and iHD resumed 7/4.  On scheduled midodrine given hypotension.  Management per renal and MICU with fluid removal as able.      Pancytopenia: WBC 2.2-3.2 since 7/7.  Platelets down to 45 on 7/9.  On VGCV as above.  Also intermittently requiring pRBC.  Peripheral smear (7/9) with moderate normochromic, normocytic anemia with no increase in erythrocyte regeneration, slight leukopenia, marked thrombocytopenia with normal platelet morphology, and no overt evidence of dysplasia. CBC overall improved today.   - CBC with differential daily, consider G-CSF if ANC </=0.8     VRE urine culture: Noted 6/19, >100k GNB and VRE  "faecium, ABX as above with management per transplant ID and MICU.     We appreciate the excellent care provided by the MICU team.  Recommendations communicated via this note.  Will continue to follow along closely, please do not hesitate to call with any questions or concerns.     Patient discussed with Dr. Macias.    Ame Chow PA-C  Pulmonary CF/Transplant     Subjective & Interval History:     Notes feeling anxiety with PST, did better yesterday evening and notes she needs a distraction. No fever or chills, denies new shortness of breath, no abdominal pain, nausea. Left arm swelling has improved, wondering about dialysis today and plan for steroids.     Review of Systems:     ROS as above, otherwise significantly limited due to intubation and sedation.    Physical Exam:     All notes, images, and labs from past 24 hours (at minimum) were reviewed.    Vital signs:  Temp: 97.9  F (36.6  C) Temp src: Oral BP: 123/57 Pulse: 74   Resp: (!) 41 SpO2: 100 % O2 Device: Mechanical Ventilator Oxygen Delivery: 6 LPM Height: 160 cm (5' 3\") Weight: 51.4 kg (113 lb 5.1 oz)  I/O:   Intake/Output Summary (Last 24 hours) at 7/12/2024 0800  Last data filed at 7/12/2024 0700  Gross per 24 hour   Intake 1159 ml   Output 1200 ml   Net -41 ml     Constitutional: Lying supine in bed, in no apparent distress   HEENT: Eyes with pink conjunctivae, anicteric.  Oral mucosa moist without lesions.  Trach cdi  PULM: Moderate airflow, scattered coarse crackles on full vent  CV: Normal S1 and S2.  RRR.  No murmur, LUE edema from elbow proximally  ABD: NABS, soft, non tender. PEG/J tube site cdi.  MSK: Moves all extremities.  + muscle wasting  NEURO: Lethargic, not conversant  SKIN: Warm, dry, fragile.  No rash on limited exam  PSYCH: Calm     Data:     LABS    CMP:   Recent Labs   Lab 07/12/24  2251 07/12/24  2119 07/12/24  1742 07/12/24  1405 07/12/24  0909 07/12/24  0504 07/11/24  0831 07/11/24  0518 07/10/24  0418 07/10/24  0416 " 07/09/24 2012 07/09/24 2007 07/09/24  0415 07/09/24  0408 07/08/24  0809 07/08/24  0443 07/07/24  0742 07/07/24  0430   NA  --   --   --   --   --  134*  --  131*  --  130*  --  133*  --  129*  --  130*  --  132*   POTASSIUM  --   --   --   --   --  3.3*  --  3.8  --  3.3*  --  3.6  --  3.4  --  3.5  --  3.5   CHLORIDE  --   --   --   --   --  97*  --  98  --  98  --  100  --  97*  --  97*  --  99   CO2  --   --   --   --   --  27  --  25  --  26  --  25  --  21*  --  23  --  24   ANIONGAP  --   --   --   --   --  10  --  8  --  6*  --  8  --  11  --  10  --  9   * 180* 208* 235*   < > 159*   < > 122*   < > 150*   < > 182*   < > 129*   < > 136*   < > 115*   BUN  --   --   --   --   --  38.5*  --  57.5*  --  38.1*  --  30.6*  --  80.2*  --  63.0*  --  41.7*   CR  --   --   --   --   --  1.84*  --  2.52*  --  1.80*  --  1.46*  --  3.15*  --  2.62*  --  1.91*   GFRESTIMATED  --   --   --   --   --  31*  --  21*  --  32*  --  41*  --  16*  --  20*  --  29*   CHELSEY  --   --   --   --   --  8.3*  --  8.6*  --  8.4*  --  8.8  --  8.3*  --  8.1*  --  8.0*   MAG  --   --   --   --   --  1.8  --   --   --  1.9  --  1.6*  --  1.9  --  1.9  --  1.8   PHOS  --   --   --   --   --  3.1  --   --   --   --   --   --   --  5.3*  --  5.0*  --  3.9   PROTTOTAL  --   --   --   --   --   --   --  5.1*  --   --   --   --   --   --   --   --   --   --    ALBUMIN  --   --   --   --   --   --   --  2.6*  --   --   --   --   --   --   --   --   --   --    BILITOTAL  --   --   --   --   --   --   --  0.4  --   --   --   --   --   --   --   --   --   --    ALKPHOS  --   --   --   --   --   --   --  282*  --   --   --   --   --   --   --   --   --   --    AST  --   --   --   --   --   --   --  30  --   --   --   --   --   --   --   --   --   --    ALT  --   --   --   --   --   --   --  13  --   --   --   --   --   --   --   --   --   --     < > = values in this interval not displayed.     CBC:   Recent Labs   Lab 07/12/24  5445  "07/11/24  0518 07/10/24  0416 07/09/24  0820   WBC 2.5* 2.3* 2.2* 3.1*   RBC 2.23* 2.26* 2.31* 2.45*   HGB 7.2* 7.3* 7.5* 8.0*   HCT 22.5* 22.3* 22.0* 23.3*   * 99 95 95   MCH 32.3 32.3 32.5 32.7   MCHC 32.0 32.7 34.1 34.3   RDW 22.0* 20.9* 18.9* 18.6*   PLT 97* 63* 47* 48*       INR: No lab results found in last 7 days.    Glucose:   Recent Labs   Lab 07/12/24  2251 07/12/24  2119 07/12/24  1742 07/12/24  1405 07/12/24  0909 07/12/24  0504   * 180* 208* 235* 133* 159*       Blood Gas:   Recent Labs   Lab 07/07/24  0748   PHV 7.35   PCO2V 45   PO2V 59*   HCO3V 25   LASHAUN -0.5   O2PER 30       Culture Data No results for input(s): \"CULT\" in the last 168 hours.    Virology Data:   Lab Results   Component Value Date    FLUAH1 Not Detected 06/18/2024    FLUAH3 Not Detected 06/18/2024    AH6329 Not Detected 06/18/2024    IFLUB Not Detected 06/18/2024    RSVA Not Detected 06/18/2024    RSVB Not Detected 06/18/2024    PIV1 Not Detected 06/18/2024    PIV2 Not Detected 06/18/2024    PIV3 Not Detected 06/18/2024    HMPV Not Detected 06/18/2024    HRVS Negative 01/24/2021    ADVBE Negative 01/24/2021    ADVC Negative 01/24/2021    ADVC Negative 12/23/2020    ADVC Negative 10/07/2019       Historical CMV results (last 3 of prior testing):  Lab Results   Component Value Date    CMVQNT Not Detected 07/09/2024    CMVQNT <35 (A) 06/18/2024    CMVQNT Not Detected 03/05/2024     Lab Results   Component Value Date    CMVLOG 1.6 07/02/2024    CMVLOG 1.6 06/25/2024    CMVLOG <1.5 06/18/2024       Urine Studies    Recent Labs   Lab Test 06/19/24  1214 01/24/21  1729   URINEPH 6.5 5.0   NITRITE Negative Negative   LEUKEST Large* Moderate*   WBCU >182* 34*       Most Recent [place] Pulmonary Function Testing (FVC/FEV1 only)  FVC-Pre   Date Value Ref Range Status   02/14/2024 0.85 L    12/19/2023 1.04 L    11/21/2023 0.85 L    10/24/2023 0.96 L      FVC-%Pred-Pre   Date Value Ref Range Status   02/14/2024 29 %    12/19/2023 36 %  "   11/21/2023 29 %    10/24/2023 33 %      FEV1-Pre   Date Value Ref Range Status   02/14/2024 0.80 L    12/19/2023 0.89 L    11/21/2023 0.78 L    10/24/2023 0.87 L      FEV1-%Pred-Pre   Date Value Ref Range Status   02/14/2024 34 %    12/19/2023 38 %    11/21/2023 33 %    10/24/2023 37 %        IMAGING    Recent Results (from the past 48 hour(s))   XR Chest Port 1 View    Narrative    XR CHEST PORT 1 VIEW  7/11/2024 6:33 AM     HISTORY:  interval follow up, lung transplant       COMPARISON:  7/8/2024    TECHNIQUE: Portable, semiupright, frontal projection radiograph of the  chest.    FINDINGS:   Stable position of tracheostomy tube and right IJ central venous  catheter. Intact sternotomy wires. Stable bronchial stent on the  right.    Stable cardiomediastinal silhouette. Increased hazy opacity throughout  the left lung. Stable mixed interstitial and airspace opacities on the  left greater than right. No pleural effusions. No pneumothorax.        Impression    IMPRESSION:  Stable support devices. Increased opacities throughout the left mid  and lower lung fields, possibly atelectasis versus edema.    I have personally reviewed the examination and initial interpretation  and I agree with the findings.    CARMELINA VILLALOBOS MD         SYSTEM ID:  [id number]

## 2024-07-13 NOTE — PROGRESS NOTES
MEDICAL ICU PROGRESS NOTE  07/13/2024      Date of Service (when I saw the patient): 07/13/2024    ASSESSMENT: Kecia Blue is a 63 year old female with PMH ILD s/p bilateral lung transplant (2018) c/b recurrent right bronchial stenosis s/p repeat balloon dilation/stenting, repeat opportunistic pneumonia (PJP 2021, aspergillus/stenotrophomonas 11/2023) and viremias (EBV, CMV), and ESRD 2/2 tacrolimus toxicity on HD who was admitted on 6/18/2024 for acute hypercapnic respiratory failure 2/2 tracheal stenosis and pneumonia.  Status post airway stent placement by IP on 6/20. Hospital course complicated by hypotension, delirium, and prolonged respiratory failure. Tracheostomy placement on 7/3 and PEGJ tube placement with IR on 7/5. Now persistent respiratory failure with minimal progress on PST, treating empirically for immune/inflammatory process. Discharge to LTACH pending plan for steroid taper.     Changes today:  - decreased midodrine to 5 mg TID  - plan for hemodialysis today  - will follow up regarding removal of trialysis line  - follow up with pulm transplant in regards to steroid plan  - follow up regarding restarting home azithromycin  - Continue BID SBT trials on pressure support 10/5 as tolerated  - Enteric and viral PCR stool sample pending   - repeat chest CT on 7/17    PLAN:    Neuro:  #Pain   - Switch Acetaminophen scheduling to 1 gram q8h to minimize nightly disturbances    #Toxic metabolic encephalopathy, resolved  Ongoing lethargy in setting of sepsis and delirium in the setting of high-dose steroid therapy. Patient reports improved sleep on current medications. Following commands this morning.   - Quetiapine 50 mg at bedtime   - Ramalteon at bedto,e  - trazodone 25mg for sleep  - delirium precautions  - Do not disturb order overnight to promote sleep    #Anxiety  Patient has been having anxiety regarding pressure support trials and weaning from the ventilator.  and patient would like  to talk to somebody while in the hospital. Will plan to consult Health Psychology tomorrow (as not here on weekends).   - Health Psychology consulted, seeing patient once per week while admitted    Pulmonary:  #Acute on chronic hypoxic hypercapnic respiratory failure, improving  #concern for possible immune/inflammatory process  #HAP, Stenotrophomonas maltophilia, Enterococcus faecalis, and Candida guilliermondi complex  #Severe pulmonary edema  #Acute respiratory distress syndrome, resolved  Presented to the ED on 6/18/2024 with acute on chronic hypoxic hypercapnic respiratory failure. Transferred to MICU and intubated on overnight on 6/18. Workup significant for Stenotrophomonas, Enterococcus, and Candida pneumonia. Course was c/b progression to ARDS (6/21-22) with elevated plateau pressures.  CXR (6/26) with similar pulmonary opacities compared to 6/24, left > right.  The etiology of this groundglass opacity was unclear but ddx includes rejection vs. infection vs. volume overload. Tracheostomy placed on 7/3 to assist with ventilator weaning. CT chest w/o contrast 7/8: Waxing and waning mostly improved right lung consolidation now with diffuse groundglass organizing opacities mostly in the lower lobe an middle lobe more than the upper lobe. Slight improvement in the peribronchiolar consolidation in the left lower lobe but there is increased groundglass organization in the left upper lobe.    - s/p tracheostomy 7/3-> ENT removed sutures and perform trach dressing change 7/8  - Pulmonary toilet  - Mucomyst BID  - Hypertonic saline BID, alternate with mucomyst  - Albuterol neb QID  - Antibiotics, as below  - Volume management with iHD  - Continue Daily SBT (AM and PM) on pressure support 10/5  - pulm transplant following  - will follow up regarding steroid plan per pulmonary transplant    Vent Mode: CMV/AC  (Continuous Mandatory Ventilation/ Assist Control)  FiO2 (%): 30 %  Resp Rate (Set): 18 breaths/min  Tidal  Volume (Set, mL): 320 mL  PEEP (cm H2O): 5 cmH2O  Pressure Support (cm H2O): 10 cmH2O  Resp: (!) 41      #Recurrent right middle bronchus stenosis s/p dilation and stent (6/20/2024), stable  Has history (bronchoscopy 2/15/24) demonstrating narrowing of right mainstem transplant anastomosis and bronchus intermedius. CT chest (6/18/2024) and bronchoscopy (6/19/2024) demonstrated occlusion of right middle bronchus. With concern for post-obstructive pneumonia, interventional pulmonology was consulted, and completed bronchoscopy (6/20/2024) with tissue debulking, right middle bronchus balloon dilation to 11 mm, stent placement in right main bronchus, and bifurcating cast placement in right upper bronchus. IP pulm re-evaluated bronchial stents with BAL and noted that they were open.    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   - Prospera (7/3) pending  - DSA (7/3) pending  - PTA nebs  - Fluticasone-viilanterol (breo ellipta) every day - HOLD with respiratory infection  - Montelukast every day   - Immunosuppressants per Tx Pulm:   - PTA Tacrolimus (dosing per tx pulm)  - PTA Prednisone 5 mg daily   - PTA azathioprine - HOLD per Transplant pulmonology with repeat infections  - Peripheral smear (7/9) pending for pancytopenia  - Antibiotic prophylaxis   - Bactrim MWF for PJP ppx (monitor need to adjust pending HD plan)  - CMV weekly monitoring (qTuesday)  - Azithromycin per pulmonary (holding due to QTC, but will follow up with pulm transplant today)  - Continue VGCV ppx (renally dosed, monitor need to adjust pending HD plan) with tentative plan for 3 weeks beyond completion of stress dose steroids   - follow up steroid plan for today via pulm  transplant, got 1 mg/kg yesterday of prednisone  - Hold PTA prednisone dose given burst dose    Cardiovascular:  #Septic shock, resolved  On 6/19/2024, developed sepsis (hypotension 80s/50s, tachypnea 24) in the setting of stenotrophomonas pneumonia/enterococcus faecium VRE UTI. Etiology of shock likely distributive iso sepsis; less likely hypovolemic (not pulling volumes with CRRT), medication-associated (weaned off of propofol gtt) or obstructive (low suspicion for PE, echo 6/19 without evidence of cardiac dysfunction). Occasional episodes of hypotension overnight, but self-resolving and have not occurred in a couple days.  - s/p SDS & pressors, now off both  - decreased midodrine 5 mg TID    #Demand Ischemia, resolved    Presented with elevated troponin (peak 499). Not associated with chest pain or hypoxia. EKG without ST elevations/depressions or T wave inversions. Suspect demand ischemia in the setting of sepsis. Will continue to monitor symptoms and continuous telemetry. EKG 7/2 sinus rhythm with PACs.     #Paroxysmal A-Fib, improved  #Pulmonary Hypertension   #Transient arhythmia with palpitations  History of pulmonary hypertension (45 mmHg, echo 10/2023) in the setting of ILD and paroxysmal afib on PTA metoprolol tartrate BID. Not on anticoagulation for afib. Transient unspecified arhythmia overnight (7/9) with palpitations. EKG 7/9 sinus rhythm with fusion complexes and known RBBB. Repeat EKG 7/10 showing sinus rhythm with no RBBB or fusion complexes. Patient given magnesium 1g and 20mEq K+ for K 3.3 this am. No further episodes of palpitations. Currently in sinus rhythm.  - continue PTA metoprolol tartrate 25mg BID  - CTM    #Prolonged Qtc  Qtc 540 yesterday- pulm tx held prophylactic azithromycin. Repeat EKG 7/13 with QTC of 483.   - CTM  - follow up with pulm transplant regarding azithromycin    GI/Nutrition:  #Nutrition  PEG-J placed by IR on 7/5, continue tube feeds.   - Nutrition consulted  - Tube  feeds 30ml/hr; at goal    #Diarrhea  On 6/21, had 8 bowel movements. Occurred in the setting of scheduled bowel regimen and starting new antibiotics (meropenem, levofloxacin). C diff negative. Ongoing diarrhea x 2 days now.  - Holding PRN bowel regimen  - Miralax PRN   - Senna prn  - Trial of Nutrisource Fiber and monitor-3 packets daily per nutrition recs  - Stool enteric bacteria and viral PCR panel pending  - Consider loperamide if stool pathogen testing negative    #Cholelithiasis with gallbladder wall thickening, resolved  During admission patient found to have up trending LFTs and fever in setting of GB wall thickening on CT abdomen/pelvis, now resolved. MRCP completed showing borderline dilated CBD up to 1.1 cm, no significant intrahepatic biliary dilation, no evidence of choledocholithiasis, no evidence of acute cholecystitis.    Renal/Fluids/Electrolytes:  #ESRD on iHD (M,W,F)  History of ESRD 2/2 Tacrolimus toxicity on HD (MWF). With soft blood pressures, placed RIJ (6/20/2024) and started CRRT (6/20/2024). US of AVF 7/5: arterial inflow patent without inflow stenosis, normal diameter of anastamosis, venous outflow elevated velocity at subclavian- suggestive of recurrent stenosis in venous outflow (area of prior angioplasty in March). IR consulted who noted that IR fistulogram not required at that time, and was able to run iHD on 7/6 off her fistula. Currently having some swelling in same arm that fistula is placed in  - Nephrology consulted and following for ESRD  - will follow recommendations for need of HD  - Renally-dosed medications  - BMP daily  - follow up US of fistula & dialysis run today if trialysis line can be removed to determine if trialysis line can be removed      Endocrine:  #Risk for hyperglycemia with high dose steroids  Stress dose steroids discontinued, resume PTA prednisone 5 mg daily. Blood sugars have remained in appropriate range.  - NPH 8U BID  -CTM     ID:  #HAP, Stenotrophomonas  maltophilia, Enterococcus faecalis, Aspergillus, and Candida guilliermondi complex  Presented to the ED on 6/18/2024 with SOB and hypercapnic respiratory failure. Found on bronchoscopy (6/19) to have RML obstruction by narrowing bronchus intermedius as well as copious mucopurulent secretions/mucus plugging. Previously treated for Stenotrophonomas/aspergillus pneumonia in 11/2023 with subsequent sputum culture (2/2024) negative for both Stenotrophonomas/Aspergillus. Etiology likely repeat Stenotrophomonas infection vs opportunistic infection from colonized microbe.   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    - Continue posaconazole 300 mg/day    - Complete 14 days of micafungin 150 mg/day today (6/25-7/9)- discontinued   - Continue VGCV prophylaxis  - Weekly CMV VL (Tuesdays, next 7/9), last was 39 (7/2)    - Azithromycin per pulmonary (holding due to QTC, but will follow up)  - TMP/SMX SS daily for PJP PPx (for life given h/o PJP)  - Awaiting susceptibilities on Aspergillus per transplant ID  - Present antibiotics   - Continue posaconazole 300 mg/day per pulm transplant (6/25 to present) for candida guillermondii and asergillus ochraceus on prior BAL  - Past antibiotics   - s/p micafungin 150 mg/day (6/25-7/9)  - s/p IV minocycline 100mg BD x 14 days total (6/20-7/5)- for stenotrophamonas  - PO linezolid 600 mg BID x 10 days (6/25-7/5)- for VRE in urine and BAL cultures  - S/p ceftazidime  (6/25-6/28)    - S/p vancomycin (6/18-6/20)   - S/p zosyn (6/18-21)  - S/p Daptomycin (6/21-6/23)  - S/p Meropenem (6/21-6/24)  - S/p Levofloxacin (6/21-6/23)    - BAL fluid (7/3): pink, hazy, cell counts normal range, fluid sent for viral, bacterial and fungal cultures negative to date, cytology negative for malignancy and organisms; preliminary AFB negative    #Pyuria, Enterococcus faecium VRE and  ESBL E. Coli   Asymptomatic. With progressive IV pressor needs, obtained broad infectious workup which demonstrated UCx (6/19/2024) with Enterococcus faecium VR and ESBL E. Coli. S/p Daptomycin (6/21-6/23) and Meropenem (6/21-6/24).    #Hx Aspergillus PNA (11/2023)  #Hx PJP PNA (1/2021)  #Hx CMV viremia, recurrent  #Hx EBV viremia  - Transplant ID consult, appreciate recs  PTA posaconazole (Treatment for hx of aspergillus PNA)  PTA azithromycin 250mg qd (CLAD ppx) (holding)  PTA bactrim (PJP ppx)  -CMV PVR 7/9- negative     Hematology:    #Acute on Chronic Normocytic Anemia, stable  #Thrombocytopenia, chronic, improving  History of chronic anemia in the setting of kidney disease (baseline Hgb 10-11). Upon admission, Hgb 9. May be bone marrow suppression in the setting of chronic illness; potential contribution by blood loss with CRRT filter changes (~150-200c). Peripheral smear (6/18/2024) without evidence of schistocytes. Hemoglobin low today at 7.1, no signs of active bleeding.   - continue to monitor with CBC but improving & stable    Musculoskeletal:  - PT / OT consult     Skin:  - Sacral Blanching- off load with frequent turns in bed    General Cares/Prophylaxis:    DVT Prophylaxis: subcutaneous heparin  GI Prophylaxis: PPI  Restraints: None  Family Communication:  updated bedside  Code Status: Full Code     Lines/tubes/drains:  - PIV x2  - RIJ- remove when able from nephrology's perspective  - Tracheostomy  - PEG/J    Disposition:  - Medical ICU  - Defer discharge to LTACH pending steroid taper plan per  transplant pulm    Patient seen and findings/plan discussed with medical ICU staff, Dr. Bui.    Jailyn Lou MD  Internal Medicine Resident, PGY-2      Clinically Significant Risk Factors        # Hypokalemia: Lowest K = 3.3 mmol/L in last 2 days, will replace as needed       # Hypoalbuminemia: Lowest albumin = 2.2 g/dL at 6/21/2024  4:26 PM, will monitor as appropriate   # Thrombocytopenia: Lowest platelets = 97 in last 2 days, will monitor for bleeding                 #Precipitous drop in Hgb/Hct: Lowest Hgb this hospitalization: 6.8 g/dL. Will continue to monitor and treat/transfuse as appropriate.      # Moderate Malnutrition: based on nutrition assessment    # Financial/Environmental Concerns: none                ====================================  INTERVAL HISTORY:   No acute events overnight. Patient denies pain and reports sleeping well overnight. Plan to complete dialysis this AM with hopes to determine if fistula is working correctly.     OBJECTIVE:   1. VITAL SIGNS:   Temp:  [97.9  F (36.6  C)-98.2  F (36.8  C)] 97.9  F (36.6  C)  Pulse:  [] 74  Resp:  [24-41] 41  BP: ()/() 123/57  FiO2 (%):  [30 %] 30 %  SpO2:  [93 %-100 %] 100 %  Vent Mode: CMV/AC  (Continuous Mandatory Ventilation/ Assist Control)  FiO2 (%): 30 %  Resp Rate (Set): 18 breaths/min  Tidal Volume (Set, mL): 320 mL  PEEP (cm H2O): 5 cmH2O  Pressure Support (cm H2O): 10 cmH2O  Resp: (!) 41    2. INTAKE/ OUTPUT:   I/O last 3 completed shifts:  In: 1260 [I.V.:10; NG/GT:560]  Out: -     3. PHYSICAL EXAMINATION:  General: Sitting up in bed, NAD   HEENT: Atraumatic, moist mucous membranes, trach in place c/d/I.  Pulm/Resp: Ongoing coarse crackles diffusely throughout, unchanged from prior exam. Non-labored breathing on vent support.   CV: RRR, no m/r/g. No peripheral edema  Abdomen: Soft, non-distended, non-tender, bowel sounds present. PEG/J in place c/d/I.  Ext: Trace bilateral LE edema, pulses 2+ radial,  pedal  Incisions/Skin: No rashes or lesions  Neuro: Awake and alert x 3, follows commands and nods and shakes head to questions, moving all extremities equally    4. LABS:   Arterial Blood Gases   No lab results found in last 7 days.    Complete Blood Count   Recent Labs   Lab 07/13/24  0706 07/12/24  0504 07/11/24  0518 07/10/24  0416   WBC 2.9* 2.5* 2.3* 2.2*   HGB 7.3* 7.2* 7.3* 7.5*   * 97* 63* 47*     Basic Metabolic Panel  Recent Labs   Lab 07/13/24  1012 07/13/24  0706 07/12/24  2251 07/12/24  2119 07/12/24  0909 07/12/24  0504 07/11/24  0831 07/11/24  0518 07/10/24  0418 07/10/24  0416   NA  --  132*  --   --   --  134*  --  131*  --  130*   POTASSIUM  --  3.6  --   --   --  3.3*  --  3.8  --  3.3*   CHLORIDE  --  95*  --   --   --  97*  --  98  --  98   CO2  --  25  --   --   --  27  --  25  --  26   BUN  --  65.2*  --   --   --  38.5*  --  57.5*  --  38.1*   CR  --  2.54*  --   --   --  1.84*  --  2.52*  --  1.80*   * 159* 162* 180*   < > 159*   < > 122*   < > 150*    < > = values in this interval not displayed.     Liver Function Tests  Recent Labs   Lab 07/11/24  0518   AST 30   ALT 13   ALKPHOS 282*   BILITOTAL 0.4   ALBUMIN 2.6*       Coagulation Profile  No lab results found in last 7 days.      5. RADIOLOGY:   Recent Results (from the past 24 hour(s))   US Ext Arterial Venous Dialys Acs Graft    Narrative    ULTRASOUND EXTREMITY ARTERIAL VENOUS DIALYSIS ACCESS GRAFT 7/12/2024  12:20 PM    CLINICAL HISTORY: Pt with prolonged bleeding time post dialysis x1 and  arm swelling with last run despite subclavian venoplasty earlier this  week, eval for structural issue..     COMPARISONS: Fistulogram and venoplasty 7/8/2024. Ultrasound extremity  arterial venous dialysis access graft.    REFERRING PROVIDER: YOON RILEY    TECHNIQUE: Left brachial cephalic graft evaluated with grayscale,  color Doppler, and spectral pulsed wave Doppler ultrasound. Flow  volumes obtained.    FINDINGS:    LEFT:  Brachial artery, axilla, above anastomosis: 205/95 cm/s  Brachial artery, axilla, above anastomosis: 5.2 mm, 849 cc/min Vol  Flow    Brachial artery, below anastomosis: 90/10 cm/s, antegrade    Arterial anastomosis: 425 cm/s, 2.07 velocity ratio  Graft, mid arm: 197 cm/s  Graft, mid arm: 6.3 mm, 1227 cc/min Vol Flow  Graft, low arm: 20 mm, 183 cm/s  Graft, antecubital fossa: 119 cm/s  Venous anastomosis: 214 cm/s    Cephalic vein, central to anastomosis: 183 cm/s  Cephalic vein, low arm: 9.1 mm, 1469 cc/min Vol Flow  Cephalic vein, upper arm: 88 cm/s  Cephalic vein, chest: 165 cm/s    Cephalic subclavian confluence: 411 cm/s, 2.19 velocity ratio    Axillary vein: 40 cm/s    Subclavian vein, mid: 80 cm/s  Subclavian vein, medial: 471 cm/s  Innominate vein: 160 cm/s    Internal jugular vein: obscured by dressing.      Impression    IMPRESSION: LEFT brachial cephalic graft  1. No arterial inflow stenosis demonstrated.    2. Graft patent. 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  Flow volume exceeds 600 mL/min.    3. Elevated velocities at the cephalic subclavian confluence and  subclavian vein to 411 cm/s and 471 cm/s but the velocity ratios are  less than 3.    OBIE DAWSON MD         SYSTEM ID:  [id number]

## 2024-07-13 NOTE — PLAN OF CARE
Goal Outcome Evaluation:ICU End of Shift Summary. See flowsheets for vital signs and detailed assessment.    Changes this shift: A/Ox4.FiO2 30%, PEEP 5, Rate 18, . Minimal secretions through trach. Denies pain. BM x1     Plan:  Continue Pressure support trials BID           Plan of Care Reviewed With: patient    Overall Patient Progress: no change  Outcome Evaluation: see note

## 2024-07-14 NOTE — PLAN OF CARE
ICU End of Shift Summary. See flowsheets for vital signs and detailed assessment.    Changes this shift: VSS.  Some soreness on bottom with frequent loose stools.  Barrier cleansers used, pt asked team for something to help further like immodium.  Worked with PT, up to chair.  Spouse and other family visited bedside. Patient in good spirits, motivated.    Plan: HD run started this evening.  Continue to increase pressure support length and attempts as strength improves.  Plans for LTACH in coming days as appropriate.    Problem: Adult Inpatient Plan of Care  Goal: Plan of Care Review  Description: The Plan of Care Review/Shift note should be completed every shift.  The Outcome Evaluation is a brief statement about your assessment that the patient is improving, declining, or no change.  This information will be displayed automatically on your shift  note.  Outcome: Progressing  Flowsheets (Taken 7/13/2024 1956)  Outcome Evaluation: Patient successfully tolerated almost 2hrs of 10/5 pressure support today.  Plan of Care Reviewed With:   patient   spouse  Overall Patient Progress: improving   Goal Outcome Evaluation:      Plan of Care Reviewed With: patient, spouse    Overall Patient Progress: improvingOverall Patient Progress: improving    Outcome Evaluation: Patient successfully tolerated almost 2hrs of 10/5 pressure support today.

## 2024-07-14 NOTE — PLAN OF CARE
ICU End of Shift Summary. See flowsheets for vital signs and detailed assessment.    Changes this shift: A/Ox4.FiO2 30%, PEEP 5, Rate 18, . Minimal secretions through trach. Denies pain. Increased stool out. BM x2     Plan: Continue P/S. Send stool sample.      Plan of Care Reviewed With: patient    Overall Patient Progress: no change

## 2024-07-14 NOTE — PROGRESS NOTES
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  The patient was admitted on 24 for progressive hypoxia with dyspnea and worsening hypercapnia in ED requiring intubation likely d/t post-obstructive PNA 2/2 right bronchus stenosis.  Bronch confirming severe stenosis of right anastomosis with extensive RLL plugging.  IP bronch () with laser tissue debulking RMB stenosis, airway balloon dilation of RMB and BI, and placement of stent RMB to BI and bifurcating stent in RUL.  S/p volume resuscitation and transition to CRRT  for hypotension, stopped  and iHD resumed .  Increased agitation/delirium on  with increasing desaturation led to need for increased sedation. Recurrence of paroxysmal afib with RVR first noted . S/p trach with ENT on 7/3 and GJ tube with IR on .  Now GG changes on CT scan so treating empirically for possible immune/inflammatory process vs organizing pneumonia with steroid burst/taper. Respiratory status improving, tolerated 2 hours PST yesterday, missed second attempt due to a late HD run.Discharge to [facility] pending plan for steroid taper, possible on Wed or Thursday following CT.      Recommendations:  - Continue steroid burst with prednisone 1 mg/kg, currently 50 mg daily, until CT on ; taper plan TBD  imaging, but can start planning for LTACH discharge late next week  - Tacrolimus level to trend low at only 8 hours; increase evening dose to 1.5 mg and check level to trend Monday (ordered)  - CT chest on contract on 7/17  - Ongoing PST BID per MICU  - Continue to hold PTA azithromycin for prolonged QTc  - Posaconazole per transplant ID   - CMV weekly monitoring (qTuesday) and VGCV ppx with increased steroid courses, duration TBD     Septic shock:  Acute on chronic hypoxic/hypercapneic respiratory failure:  Post-obstructive multi-lobar PNA:  Right bronchial stenosis with RML collapse: Admitted with 2 weeks of progressive hypoxia, dyspnea, congested cough, and fatigue.  Chronic hypoxia with 2L NC, increased from 3 to 4L over this time with acute decompensation on day of admission associated with hypercapnia. Intubated in the ED. Negative comprehensive ID workup on admission. Procal mildly elevated at 0.24 initially (then elevated to 1.77 6/20), LA normal, but febrile to 100.7.  TTE on admission grossly normal.  CT with RUL/RLL consolidative opacities, RML collapse, and narrowing of BI and distal RLL bronchus.  Bronch per MICU (6/19) with severe stenosis starting at right anastomosis, inspection with smaller scope revealing for extensive RLL mucous plugging.  S/p bronch per IP (6/20) with laser tissue debulking RMB stenosis, airway balloon dilation of RMB and BI, and placement of stent RMB to BI and bifurcating stent in RUL.  Respiratory cultures with Steno, VSE, VRE, Aspergillus ochraceus, and Elidia guilliermondii.  Chest CT (6/29) with multifocal panlobar opacities and small bibasilar pleural effusions.  S/p trach with ENT on 7/3 and GJ tube with IR on 7/5.  S/p stress dose steroids 6/21-7/5.  Chest CT (7/8) with waxing and waning mostly improved right lung consolidation and new diffuse GGO >BLL, increased GGO in KONRAD. Started on steroid burst and overall improving. Tolerated two hours of PST at 10/5 yesterday,  very motivated.   - ABX: none currently; s/p linezolid (6/25-7/5), minocycline (6/20-7/5, Steno), ceftazidime (6/25-6/28), meropenem (6/21-6/24), daptomycin (6/21-6/24), levofloxacin (6/21-6/23), Zosyn (6/18-6/21), azithromycin 500 mg daily (6/18-6/20), and vancomycin (6/18)   - Steroid burst (7/11) with prednisone 1 mg/kg daily, taper TBD based on clinical response and imaging on 7/17; may start planning for late week discharge  - Nebs: levalbuterol QID, Mucomyst 10% BID alternating with 3% HTS (6/22), will need to be on NS nebs BID upon discharge per IP  - Vent management and weaning per MICU  - Chest CT non-contrast on 7/17     S/p bilateral sequential lung transplant (BSLT) for ILD:   Progressive CLAD: Most recent OP visit in February.  DSA negative 7/3 (last positive noted 2018).  Repeat ImmuKnow (6/19) 87, very low but IST adjustment deferred per Dr. Graham given acuity and steroids (after ImmuKnow level).  Repeat Prospera remains high at 2.26 on 7/3 possibly related to recent infection, now on steroids. ImmuKnow remains low at 89 on 7/8, defer IST adjustment at this time.  - PTA Singulair, Breo inhaler (on hold with trach), azithromycin (held 7/10 for prolonged QTc, remains elevated at 483)     Immunosuppression:  On 2-drug IST since November d/t leukopenia and recurrent infections  - Tacrolimus 0.8 mg BID. Goal level 8-10. Level sub therapeutic 7/12, so dose increased to 1 mg BID. Level today to trend of 6.6 at 8 hours (after 3 doses), so likely very low; continue 1 mg qAM and increase to 1.5 mg qPM and recheck level tomorrow, Monday, to trend; may need additional increase  - Prednisone 5 mg daily, on hold with steroid burst as above     Prophylaxis:   - Bactrim for PJP ppx (renally dosed)     ID: H/o Actinomyces (10/17/23) and recurrent Steno (8/17/23 and 11/1/23).  S/p ABX as above.     Aspergillus ochraceus:  H/o Aspergillus empyema: S/p empyema drainage and ampho B instillation.  Previously on  voriconazole, transitioned to posaconazole and managed by transplant ID as OP with last visit 3/13.  Calcified fungal elements remain with additional recurrent effusion (likely associated with fluid disturbances r/t iHD), but surgical intervention previously deferred d/t risk.  Posaconazole level 2.5 on 6/19.  Susceptibility testing done on fungal bronch culture 6/19 per transplant ID.  S/p micafungin 150 mg daily (6/25-7/9) per transplant ID.  - Posaconazole 300 mg daily chronically per transplant ID (signed off 7/9) with plan for transplant ID follow up ~6-8 weeks      H/o CMV viremia: Recurrent CMV viremia historically.  VGCV ppx most recently stopped 4/12.  Recent CMV low positive at 46 (6/12), <35 (6/18), 39 (6/25, 7/2) and now negative 7/9.  - On valcyte twice weekly while on steroid bust/taper  - CMV weekly monitoring (qTuesday) and VGCV ppx (6/24, renally dosed) with increased steroid courses, duration TBD     EBV viremia: S/p rituximab 12/13/23 x1 dose.  Most recent EBV negative 6/18.     Dilated CBD:  Suspected acute cholecystitis: MRCP (6/29) with CBD dilation with stones and cholelithiasis without cholecystitis.  GI signed off 6/30, recommending general surgery consult if RUQ pain recurs.     ESRD on iHD: Kidney evaluation started as OP.  On qMWF iHD schedule, no missed sessions.  Transitioned to CRRT on 6/20, stopped 7/2 and iHD resumed 7/4.  - Midodrine to be stopped today and internal jugular line will be removed  - Management per renal and MICU      Pancytopenia: WBC 2.2-3.2 since 7/7.  Platelets down to 45 on 7/9.  On VGCV as above.  Also intermittently requiring pRBC.  Peripheral smear (7/9) with moderate normochromic, normocytic anemia with no increase in erythrocyte regeneration, slight leukopenia, marked thrombocytopenia with normal platelet morphology, and no overt evidence of dysplasia. CBCstable.  - CBC with differential daily, consider G-CSF if ANC </=0.8     VRE urine culture: Noted 6/19,  ">100k GNB and VRE faecium, ABX as above with management per transplant ID and MICU.     We appreciate the excellent care provided by the MICU team.  Recommendations communicated via this note.  Will continue to follow along closely, please do not hesitate to call with any questions or concerns.     Patient discussed with Dr. Macias.    Ame Chow PA-C  Pulmonary CF/Transplant     Subjective & Interval History:     Working with PT this am, up in chair and very motivated. Frustrates that she had late dialysis yesterday and missed second PST, also didn't get sleep medications until after midnight. No new breathing issues, feeling slightly less anxious with the PSTs. No nausea, having frequent loose stools and some urinary hesitancy, but able to go.     Review of Systems:     ROS as above, otherwise significantly limited due to intubation and sedation.    Physical Exam:     All notes, images, and labs from past 24 hours (at minimum) were reviewed.    Vital signs:  Temp: 98.3  F (36.8  C) Temp src: Oral BP: 117/60 Pulse: 79   Resp: 24 SpO2: 100 % O2 Device: Mechanical Ventilator Oxygen Delivery: 6 LPM Height: 160 cm (5' 3\") Weight: 50.8 kg (111 lb 15.9 oz)  I/O:   Intake/Output Summary (Last 24 hours) at 7/12/2024 0800  Last data filed at 7/12/2024 0700  Gross per 24 hour   Intake 1159 ml   Output 1200 ml   Net -41 ml     Constitutional: Sitting up in in the chair in no distress  HEENT: Eyes with pink conjunctivae, anicteric.  Oral mucosa moist without lesions.  Trach cdi  PULM: Moderate airflow, scattered coarse crackles on full vent  CV: Normal S1 and S2.  RRR.  No murmur, LUE edema from elbow proximally, improved slightly  ABD: NABS, soft, non tender. PEG/J tube site cdi  MSK: Moves all extremities.  + muscle wasting  NEURO: Awake and alert, mouthing answers to all questions  SKIN: Warm, dry, fragile.  No rash on limited exam  PSYCH: Calm     Data:     LABS    CMP:   Recent Labs   Lab 07/14/24  0656 07/13/24  2315 " 07/13/24  2139 07/13/24  1715 07/13/24  1012 07/13/24  0706 07/12/24  0909 07/12/24  0504 07/11/24  0831 07/11/24  0518 07/10/24  0418 07/10/24  0416 07/09/24  0415 07/09/24  0408   *  --   --   --   --  132*  --  134*  --  131*  --  130*   < > 129*   POTASSIUM 3.6  --   --   --   --  3.6  --  3.3*  --  3.8  --  3.3*   < > 3.4   CHLORIDE 97*  --   --   --   --  95*  --  97*  --  98  --  98   < > 97*   CO2 27  --   --   --   --  25  --  27  --  25  --  26   < > 21*   ANIONGAP 9  --   --   --   --  12  --  10  --  8  --  6*   < > 11   * 172* 196* 114*   < > 159*   < > 159*   < > 122*   < > 150*   < > 129*   BUN 33.9*  --   --   --   --  65.2*  --  38.5*  --  57.5*  --  38.1*   < > 80.2*   CR 1.50*  --   --   --   --  2.54*  --  1.84*  --  2.52*  --  1.80*   < > 3.15*   GFRESTIMATED 39*  --   --   --   --  21*  --  31*  --  21*  --  32*   < > 16*   CHELSEY 8.4*  --   --   --   --  8.4*  --  8.3*  --  8.6*  --  8.4*   < > 8.3*   MAG 1.7  --   --   --   --  1.9  --  1.8  --   --   --  1.9   < > 1.9   PHOS 3.3  --   --   --   --  4.8*  --  3.1  --   --   --   --   --  5.3*   PROTTOTAL  --   --   --   --   --   --   --   --   --  5.1*  --   --   --   --    ALBUMIN  --   --   --   --   --   --   --   --   --  2.6*  --   --   --   --    BILITOTAL  --   --   --   --   --   --   --   --   --  0.4  --   --   --   --    ALKPHOS  --   --   --   --   --   --   --   --   --  282*  --   --   --   --    AST  --   --   --   --   --   --   --   --   --  30  --   --   --   --    ALT  --   --   --   --   --   --   --   --   --  13  --   --   --   --     < > = values in this interval not displayed.     CBC:   Recent Labs   Lab 07/14/24  0656 07/13/24  0706 07/12/24  0504 07/11/24  0518   WBC 2.4* 2.9* 2.5* 2.3*   RBC 2.27* 2.23* 2.23* 2.26*   HGB 7.6* 7.3* 7.2* 7.3*   HCT 23.4* 22.9* 22.5* 22.3*   * 103* 101* 99   MCH 33.5* 32.7 32.3 32.3   MCHC 32.5 31.9 32.0 32.7   RDW 24.3* 23.6* 22.0* 20.9*    137* 97* 63*  "      INR: No lab results found in last 7 days.    Glucose:   Recent Labs   Lab 07/14/24  0656 07/13/24  2315 07/13/24  2139 07/13/24  1715 07/13/24  1012 07/13/24  0706   * 172* 196* 114* 135* 159*       Blood Gas:   No lab results found in last 7 days.      Culture Data No results for input(s): \"CULT\" in the last 168 hours.    Virology Data:   Lab Results   Component Value Date    FLUAH1 Not Detected 06/18/2024    FLUAH3 Not Detected 06/18/2024    [id number] Not Detected 06/18/2024    IFLUB Not Detected 06/18/2024    RSVA Not Detected 06/18/2024    RSVB Not Detected 06/18/2024    PIV1 Not Detected 06/18/2024    PIV2 Not Detected 06/18/2024    PIV3 Not Detected 06/18/2024    HMPV Not Detected 06/18/2024    HRVS Negative 01/24/2021    ADVBE Negative 01/24/2021    ADVC Negative 01/24/2021    ADVC Negative 12/23/2020    ADVC Negative 10/07/2019       Historical CMV results (last 3 of prior testing):  Lab Results   Component Value Date    CMVQNT Not Detected 07/09/2024    CMVQNT <35 (A) 06/18/2024    CMVQNT Not Detected 03/05/2024     Lab Results   Component Value Date    CMVLOG 1.6 07/02/2024    CMVLOG 1.6 06/25/2024    CMVLOG <1.5 06/18/2024       Urine Studies    Recent Labs   Lab Test 06/19/24  1214 01/24/21  1729   URINEPH 6.5 5.0   NITRITE Negative Negative   LEUKEST Large* Moderate*   WBCU >182* 34*       Most Recent [place] Pulmonary Function Testing (FVC/FEV1 only)  FVC-Pre   Date Value Ref Range Status   02/14/2024 0.85 L    12/19/2023 1.04 L    11/21/2023 0.85 L    10/24/2023 0.96 L      FVC-%Pred-Pre   Date Value Ref Range Status   02/14/2024 29 %    12/19/2023 36 %    11/21/2023 29 %    10/24/2023 33 %      FEV1-Pre   Date Value Ref Range Status   02/14/2024 0.80 L    12/19/2023 0.89 L    11/21/2023 0.78 L    10/24/2023 0.87 L      FEV1-%Pred-Pre   Date Value Ref Range Status   02/14/2024 34 %    12/19/2023 38 %    11/21/2023 33 %    10/24/2023 37 %        IMAGING    Recent Results (from the past 48 " hour(s))   XR Chest Port 1 View    Narrative    XR CHEST PORT 1 VIEW  7/11/2024 6:33 AM     HISTORY:  interval follow up, lung transplant       COMPARISON:  7/8/2024    TECHNIQUE: Portable, semiupright, frontal projection radiograph of the  chest.    FINDINGS:   Stable position of tracheostomy tube and right IJ central venous  catheter. Intact sternotomy wires. Stable bronchial stent on the  right.    Stable cardiomediastinal silhouette. Increased hazy opacity throughout  the left lung. Stable mixed interstitial and airspace opacities on the  left greater than right. No pleural effusions. No pneumothorax.        Impression    IMPRESSION:  Stable support devices. Increased opacities throughout the left mid  and lower lung fields, possibly atelectasis versus edema.    I have personally reviewed the examination and initial interpretation  and I agree with the findings.    CARMELINA VILLALOBOS MD         SYSTEM ID:  [id number]

## 2024-07-14 NOTE — PROGRESS NOTES
MEDICAL ICU PROGRESS NOTE  07/14/2024      Date of Service (when I saw the patient): 07/14/2024    ASSESSMENT: Kecia Blue is a 63 year old female with PMH ILD s/p bilateral lung transplant (2018) c/b recurrent right bronchial stenosis s/p repeat balloon dilation/stenting, repeat opportunistic pneumonia (PJP 2021, aspergillus/stenotrophomonas 11/2023) and viremias (EBV, CMV), and ESRD 2/2 tacrolimus toxicity on HD who was admitted on 6/18/2024 for acute hypercapnic respiratory failure 2/2 tracheal stenosis and pneumonia.  Status post airway stent placement by IP on 6/20. Hospital course complicated by hypotension, delirium, and prolonged respiratory failure. Tracheostomy placement on 7/3 and PEGJ tube placement with IR on 7/5. Now persistent respiratory failure with minimal progress on PST, treating empirically for immune/inflammatory process. Discharge to LTACH pending plan for steroid taper.     Changes today:  - Discontinue midodrine  - Plan for hemodialysis tomorrow  - Remove trialysis line today  - Per pulm transplant, continue prednisone 50 mg daily, until CT on 7/17; taper plan TBD imaging  - Continue to hold home azithromycin for prolonged Qtc per pulm transplant  - Continue BID SBT trials on pressure support 10/5 as tolerated  - Enteric and viral PCR stool sample pending   - repeat chest CT on 7/17    PLAN:    Neuro:  #Pain   - Acetaminophen 1 gram q8h prn       #Toxic metabolic encephalopathy, resolved  Ongoing lethargy in setting of sepsis and delirium in the setting of high-dose steroid therapy. Patient reports improved sleep on current medications. Following commands this morning.   - Decrease quetiapine to 25 mg at bedtime   - Ramalteon at bedtime  - trazodone 25mg for sleep  - delirium precautions  - Do not disturb order overnight to promote sleep    #Anxiety, improving  Patient has been having anxiety regarding pressure support trials and weaning from the ventilator.  and patient  would like to talk to somebody while in the hospital. Will plan to consult Health Psychology tomorrow (as not here on weekends).   - Health Psychology consulted, seeing patient once per week while admitted- improved    Pulmonary:  #Acute on chronic hypoxic hypercapnic respiratory failure, improving  #concern for possible immune/inflammatory process  #HAP, Stenotrophomonas maltophilia, Enterococcus faecalis, and Candida guilliermondi complex  #Severe pulmonary edema  #Acute respiratory distress syndrome, resolved  Presented to the ED on 6/18/2024 with acute on chronic hypoxic hypercapnic respiratory failure. Transferred to MICU and intubated on overnight on 6/18. Workup significant for Stenotrophomonas, Enterococcus, and Candida pneumonia. Course was c/b progression to ARDS (6/21-22) with elevated plateau pressures.  CXR (6/26) with similar pulmonary opacities compared to 6/24, left > right.  The etiology of this groundglass opacity was unclear but ddx includes rejection vs. infection vs. volume overload. Tracheostomy placed on 7/3 to assist with ventilator weaning. CT chest w/o contrast 7/8: Waxing and waning mostly improved right lung consolidation now with diffuse groundglass organizing opacities mostly in the lower lobe an middle lobe more than the upper lobe. Slight improvement in the peribronchiolar consolidation in the left lower lobe but there is increased groundglass organization in the left upper lobe.    - s/p tracheostomy 7/3-> ENT removed sutures and perform trach dressing change 7/8  - Pulmonary toilet  - Mucomyst BID  - Hypertonic saline BID, alternate with mucomyst  - Albuterol neb QID  - Antibiotics, as below  - Volume management with iHD  - Continue Daily SBT (AM and PM) on pressure support 10/5  - pulm transplant following    Vent Mode: CMV/AC  (Continuous Mandatory Ventilation/ Assist Control)  FiO2 (%): 30 %  Resp Rate (Set): 18 breaths/min  Tidal Volume (Set, mL): 320 mL  PEEP (cm H2O): 5  cmH2O  Pressure Support (cm H2O): 10 cmH2O  Resp: 24      #Recurrent right middle bronchus stenosis s/p dilation and stent (6/20/2024), stable  Has history (bronchoscopy 2/15/24) demonstrating narrowing of right mainstem transplant anastomosis and bronchus intermedius. CT chest (6/18/2024) and bronchoscopy (6/19/2024) demonstrated occlusion of right middle bronchus. With concern for post-obstructive pneumonia, interventional pulmonology was consulted, and completed bronchoscopy (6/20/2024) with tissue debulking, right middle bronchus balloon dilation to 11 mm, stent placement in right main bronchus, and bifurcating cast placement in right upper bronchus. IP pulm re-evaluated bronchial stents with BAL and noted that they were open.    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   - Prospera (7/3) pending  - DSA (7/3) pending  - PTA nebs  - Fluticasone-viilanterol (breo ellipta) every day - HOLD with respiratory infection  - Montelukast every day   - Immunosuppressants per Tx Pulm:   - PTA Tacrolimus (dosing per tx pulm)  - PTA Prednisone 5 mg daily   - PTA azathioprine - HOLD per Transplant pulmonology with repeat infections  - Peripheral smear (7/9) pending for pancytopenia  - Antibiotic prophylaxis   - Bactrim MWF for PJP ppx (monitor need to adjust pending HD plan)  - CMV weekly monitoring (qTuesday)  - Azithromycin per pulmonary (holding due to QTC, but will follow up with pulm transplant today)  - Continue VGCV ppx (renally dosed, monitor need to adjust pending HD plan) with tentative plan for 3 weeks beyond completion of stress dose steroids   - Per pulm transplant: Continue steroid burst with prednisone 1 mg/kg, currently 50 mg  daily, until CT on 7/17; taper plan TBD imaging, but can start planning for LTACH discharge next Wed   - Hold PTA prednisone dose given burst dose    Cardiovascular:  #Septic shock, resolved  On 6/19/2024, developed sepsis (hypotension 80s/50s, tachypnea 24) in the setting of stenotrophomonas pneumonia/enterococcus faecium VRE UTI. Etiology of shock likely distributive iso sepsis; less likely hypovolemic (not pulling volumes with CRRT), medication-associated (weaned off of propofol gtt) or obstructive (low suspicion for PE, echo 6/19 without evidence of cardiac dysfunction). Occasional episodes of hypotension overnight, but self-resolving and have not occurred in a couple days.  - s/p SDS & pressors, now off both  - MAPs 70s-80s, stable  - Discontinue midodrine    #Demand Ischemia, resolved    Presented with elevated troponin (peak 499). Not associated with chest pain or hypoxia. EKG without ST elevations/depressions or T wave inversions. Suspect demand ischemia in the setting of sepsis. Will continue to monitor symptoms and continuous telemetry. EKG 7/2 sinus rhythm with PACs.     #Paroxysmal A-Fib, improved  #Pulmonary Hypertension   #Transient arhythmia with palpitations  History of pulmonary hypertension (45 mmHg, echo 10/2023) in the setting of ILD and paroxysmal afib on PTA metoprolol tartrate BID. Not on anticoagulation for afib. Transient unspecified arhythmia overnight (7/9) with palpitations. EKG 7/9 sinus rhythm with fusion complexes and known RBBB. Repeat EKG 7/10 showing sinus rhythm with no RBBB or fusion complexes. Patient given magnesium 1g and 20mEq K+ for K 3.3 this am. No further episodes of palpitations. Currently in sinus rhythm.  - continue PTA metoprolol tartrate 25mg BID  - CTM    #Prolonged Qtc  Repeat EKG 7/13 with QTC of 483.   - CTM  - Continue to hold azithromycin ppx per pulm transplant     GI/Nutrition:  #Nutrition  PEG-J placed by IR on 7/5, continue tube feeds.   - Nutrition  consulted  - Tube feeds 30ml/hr; at goal    #Diarrhea  On 6/21, had 8 bowel movements. Occurred in the setting of scheduled bowel regimen and starting new antibiotics (meropenem, levofloxacin). C diff negative. Ongoing diarrhea x 2 days now.  - Holding PRN bowel regimen  - Miralax PRN   - Senna prn  - Trial of Nutrisource Fiber and monitor-3 packets daily per nutrition recs  - Ongoing loose stools  - Stool enteric bacteria and viral PCR panel pending  - Consider loperamide if stool pathogen testing negative    #Cholelithiasis with gallbladder wall thickening, resolved  During admission patient found to have up trending LFTs and fever in setting of GB wall thickening on CT abdomen/pelvis, now resolved. MRCP completed showing borderline dilated CBD up to 1.1 cm, no significant intrahepatic biliary dilation, no evidence of choledocholithiasis, no evidence of acute cholecystitis.    Renal/Fluids/Electrolytes:  #ESRD on iHD (M,W,F)  History of ESRD 2/2 Tacrolimus toxicity on HD (MWF). With soft blood pressures, placed RIJ (6/20/2024) and started CRRT (6/20/2024). US of AVF 7/5: arterial inflow patent without inflow stenosis, normal diameter of anastamosis, venous outflow elevated velocity at subclavian- suggestive of recurrent stenosis in venous outflow (area of prior angioplasty in March). IR consulted who noted that IR fistulogram not required at that time, and was able to run iHD on 7/6 off her fistula. Currently having some swelling in same arm that fistula is placed in  - Nephrology consulted and following for ESRD  - will follow recommendations for need of HD  - Renally-dosed medications  - BMP daily  - HD yesterday, 2.4L removed  - Remove temporary HD line today      Endocrine:  #Risk for hyperglycemia with high dose steroids  Stress dose steroids discontinued, resume PTA prednisone 5 mg daily. Blood sugars have remained in appropriate range.  - NPH 8U BID  -CTM     ID:  #HAP, Stenotrophomonas maltophilia,  Enterococcus faecalis, Aspergillus, and Candida guilliermondi complex  Presented to the ED on 6/18/2024 with SOB and hypercapnic respiratory failure. Found on bronchoscopy (6/19) to have RML obstruction by narrowing bronchus intermedius as well as copious mucopurulent secretions/mucus plugging. Previously treated for Stenotrophonomas/aspergillus pneumonia in 11/2023 with subsequent sputum culture (2/2024) negative for both Stenotrophonomas/Aspergillus. Etiology likely repeat Stenotrophomonas infection vs opportunistic infection from colonized microbe.   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    - Continue posaconazole 300 mg/day    - Complete 14 days of micafungin 150 mg/day today (6/25-7/9)- discontinued   - Continue VGCV prophylaxis  - Weekly CMV VL (Tuesdays, next 7/9), last was 39 (7/2)    - Azithromycin per pulmonary (holding due to QTC, but will follow up)  - TMP/SMX SS daily for PJP PPx (for life given h/o PJP)  - Awaiting susceptibilities on Aspergillus per transplant ID  - Present antibiotics   - Continue posaconazole 300 mg/day per pulm transplant (6/25 to present) for candida guillermondii and asergillus ochraceus on prior BAL  - Past antibiotics   - s/p micafungin 150 mg/day (6/25-7/9)  - s/p IV minocycline 100mg BD x 14 days total (6/20-7/5)- for stenotrophamonas  - PO linezolid 600 mg BID x 10 days (6/25-7/5)- for VRE in urine and BAL cultures  - S/p ceftazidime (6/25-6/28)     - S/p vancomycin (6/18-6/20)   - S/p zosyn (6/18-21)  - S/p Daptomycin (6/21-6/23)  - S/p Meropenem (6/21-6/24)  - S/p Levofloxacin (6/21-6/23)    - BAL fluid (7/3): pink, hazy, cell counts normal range, fluid sent for viral, bacterial and fungal cultures negative to date, cytology negative for malignancy and organisms; preliminary AFB negative    #Pyuria, Enterococcus faecium VRE and  ESBL E. Coli   Asymptomatic. With progressive IV pressor needs, obtained broad infectious workup which demonstrated UCx (6/19/2024) with Enterococcus faecium VR and ESBL E. Coli. S/p Daptomycin (6/21-6/23) and Meropenem (6/21-6/24).    #Hx Aspergillus PNA (11/2023)  #Hx PJP PNA (1/2021)  #Hx CMV viremia, recurrent  #Hx EBV viremia  - Transplant ID consult, appreciate recs  PTA posaconazole (Treatment for hx of aspergillus PNA)  PTA azithromycin 250mg qd (CLAD ppx) (holding)  PTA bactrim (PJP ppx)  -CMV PVR 7/9- negative     Hematology:    #Acute on Chronic Normocytic Anemia, stable  #Thrombocytopenia, chronic, improving  History of chronic anemia in the setting of kidney disease (baseline Hgb 10-11). Upon admission, Hgb 9. May be bone marrow suppression in the setting of chronic illness; potential contribution by blood loss with CRRT filter changes (~150-200c). Peripheral smear (6/18/2024) without evidence of schistocytes. Hemoglobin low today at 7.1, no signs of active bleeding.   - continue to monitor with CBC but improving & stable    Musculoskeletal:  - PT / OT consult     Skin:  - Sacral Blanching- off load with frequent turns in bed    General Cares/Prophylaxis:    DVT Prophylaxis: subcutaneous heparin  GI Prophylaxis: PPI  Restraints: None  Family Communication:  updated bedside  Code Status: Full Code     Lines/tubes/drains:  - PIV x2  - RIJ- remove today  - Tracheostomy  - PEG/J    Disposition:  - Medical ICU  - Per pulm transplant okay for LTACH transfer mid next week, pending steroid taper plan after CT chest on  7/17.    Patient seen and findings/plan discussed with medical ICU staff, Dr. Bui.    Edin Davis MD  Internal Medicine Resident, PGY-1      Clinically Significant Risk Factors              # Hypoalbuminemia: Lowest albumin = 2.2 g/dL at 6/21/2024  4:26 PM, will monitor as appropriate                   #Precipitous drop in Hgb/Hct: Lowest Hgb this hospitalization: 6.8 g/dL. Will continue to monitor and treat/transfuse as appropriate.      # Moderate Malnutrition: based on nutrition assessment    # Financial/Environmental Concerns: none                ====================================  INTERVAL HISTORY:   No acute events overnight. Patient able to tolerate PS 10/5 30% FiO2 for 2 hours yesterday. Anxiety surrounding PS trials improving per . Patient denies pain. Plan to remove temporary HD catheter today.    OBJECTIVE:   1. VITAL SIGNS:   Temp:  [98  F (36.7  C)-98.4  F (36.9  C)] 98.3  F (36.8  C)  Pulse:  [] 79  Resp:  [22-44] 24  BP: (105-139)/(58-99) 117/60  FiO2 (%):  [30 %] 30 %  SpO2:  [96 %-100 %] 100 %  Vent Mode: CMV/AC  (Continuous Mandatory Ventilation/ Assist Control)  FiO2 (%): 30 %  Resp Rate (Set): 18 breaths/min  Tidal Volume (Set, mL): 320 mL  PEEP (cm H2O): 5 cmH2O  Pressure Support (cm H2O): 10 cmH2O  Resp: 24    2. INTAKE/ OUTPUT:   I/O last 3 completed shifts:  In: 1280 [NG/GT:620]  Out: 2400 [Other:2400]    3. PHYSICAL EXAMINATION:  General: Sitting up in bed, NAD   HEENT: Atraumatic, moist mucous membranes, trach in place c/d/I.  Pulm/Resp: Ongoing coarse crackles diffusely throughout, unchanged from prior exam. Non-labored breathing on vent support.   CV: RRR, no m/r/g. No peripheral edema  Abdomen: Soft, non-distended, non-tender, bowel sounds present. PEG/J in place c/d/I.  Ext: Trace bilateral LE edema, pulses 2+ radial, pedal; stable 2+ pitting edema L forearm distal to AVF, no signs of bleeding  Incisions/Skin: No rashes or lesions  Neuro: Awake and alert x 3,  follows commands and nods and shakes head to questions, moving all extremities equally    4. LABS:   Arterial Blood Gases   No lab results found in last 7 days.    Complete Blood Count   Recent Labs   Lab 07/13/24 0706 07/12/24  0504 07/11/24  0518 07/10/24  0416   WBC 2.9* 2.5* 2.3* 2.2*   HGB 7.3* 7.2* 7.3* 7.5*   * 97* 63* 47*     Basic Metabolic Panel  Recent Labs   Lab 07/13/24  2315 07/13/24  2139 07/13/24  1715 07/13/24  1012 07/13/24  0706 07/12/24  0909 07/12/24  0504 07/11/24 0831 07/11/24  0518 07/10/24  0418 07/10/24  0416   NA  --   --   --   --  132*  --  134*  --  131*  --  130*   POTASSIUM  --   --   --   --  3.6  --  3.3*  --  3.8  --  3.3*   CHLORIDE  --   --   --   --  95*  --  97*  --  98  --  98   CO2  --   --   --   --  25  --  27  --  25  --  26   BUN  --   --   --   --  65.2*  --  38.5*  --  57.5*  --  38.1*   CR  --   --   --   --  2.54*  --  1.84*  --  2.52*  --  1.80*   * 196* 114* 135* 159*   < > 159*   < > 122*   < > 150*    < > = values in this interval not displayed.     Liver Function Tests  Recent Labs   Lab 07/11/24 0518   AST 30   ALT 13   ALKPHOS 282*   BILITOTAL 0.4   ALBUMIN 2.6*       Coagulation Profile  No lab results found in last 7 days.      5. RADIOLOGY:   No results found for this or any previous visit (from the past 24 hour(s)).

## 2024-07-14 NOTE — PROGRESS NOTES
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 Nephrology consulted for management of HD while admitted.      Interval History :   Mrs Blue is planned had HD yesterday, we were monitoring for long bleeding time as she still had some after IR plasty earlier this week.  She did not have this but did have hand/arm swelling distal to fistula so stopped run after 2.5h. Rechecked US yesterday that revealed no obvious stenosis.  Will attempt HD again today though fistula.      Assessment & Recommendations:   ESRD-ESKD: pt dialyzes MWF at [facility] (ph [phone], f [phone]) with Dr. Glasgow. Run time: 3.5 hrs. EDW 52.5 kg. Access: L AVF. +heparin 1,500u bolus.                  -Still has temp line, we can likely pull but will review US today.      -Holding today, and continue a TTS for now.                  -No need for new dialysis consent, continuing long term HD for ESRD.                     Outpt Rx:       Volume-Wt as low as 49kg during her course (although may have been dry at the time as she needed some neosynephrine), EDW likely a bit lower with ICU stay ~50-51kg.      Pulm-Admitted with hypercapnic resp failure, treated for PNA. Trached, progressing well.       Electrolytes-Stable.     BMD-No acute issues.      Anemia-Hgb 7.3, last PRBC's 6/26 on venofer 50mg weekly but will hold while treating for infection. On Mircera 60mcg [id number], will cover with short term 4k Epo with runs while admitted when stable enough for HD.       Nutrition-Novasource renal        Review of Systems:   I reviewed the following systems:  Gen: No fevers or  "chills  CV: No CP at rest  Resp: No SOB at rest  GI: No N/V    Physical Exam:   I/O last 3 completed shifts:  In: 1395 [I.V.:10; NG/GT:695]  Out: -    BP (!) 126/99 (BP Location: Right arm)   Pulse 93   Temp 98.4  F (36.9  C) (Oral)   Resp (!) 44   Ht 1.6 m (5' 3\")   Wt 52.4 kg (115 lb 8.3 oz)   LMP 06/07/2014 (Exact Date)   SpO2 100%   BMI 20.46 kg/m       GENERAL APPEARANCE: Vent via trach   EYES: No scleral icterus  Pulmonary: Stable on vent  CV: Regular rhythm, normal rate   - Edema none  GI: soft, nontender, normal bowel sounds  MS: no evidence of inflammation in joints, no muscle tenderness  : No Basilio  SKIN: no rash, warm, dry  NEURO: No focal deficits    Labs:   All labs reviewed by me  Electrolytes/Renal -   Recent Labs   Lab Test 07/13/24  2139 07/13/24  1715 07/13/24  1012 07/13/24  0706 07/12/24  0909 07/12/24  0504 07/11/24  0831 07/11/24  0518 07/10/24  0418 07/10/24  0416 07/09/24  0415 07/09/24  0408   NA  --   --   --  132*  --  134*  --  131*  --  130*   < > 129*   POTASSIUM  --   --   --  3.6  --  3.3*  --  3.8  --  3.3*   < > 3.4   CHLORIDE  --   --   --  95*  --  97*  --  98  --  98   < > 97*   CO2  --   --   --  25  --  27  --  25  --  26   < > 21*   BUN  --   --   --  65.2*  --  38.5*  --  57.5*  --  38.1*   < > 80.2*   CR  --   --   --  2.54*  --  1.84*  --  2.52*  --  1.80*   < > 3.15*   * 114* 135* 159*   < > 159*   < > 122*   < > 150*   < > 129*   CHELSEY  --   --   --  8.4*  --  8.3*  --  8.6*  --  8.4*   < > 8.3*   MAG  --   --   --  1.9  --  1.8  --   --   --  1.9   < > 1.9   PHOS  --   --   --  4.8*  --  3.1  --   --   --   --   --  5.3*    < > = values in this interval not displayed.       CBC -   Recent Labs   Lab Test 07/13/24  0706 07/12/24  0504 07/11/24  0518   WBC 2.9* 2.5* 2.3*   HGB 7.3* 7.2* 7.3*   * 97* 63*       LFTs -   Recent Labs   Lab Test 07/11/24  0518 07/02/24  0359 07/01/24  1637 07/01/24  0346   ALKPHOS 282* 176*  --  173*   BILITOTAL 0.4 0.6  " --  0.6   ALT 13 37  --  36   AST 30 26  --  25   PROTTOTAL 5.1* 5.4*  --  5.0*   ALBUMIN 2.6* 2.8* 2.7* 2.6*       Iron Panel -   Recent Labs   Lab Test 02/03/21  0415 12/13/18  1033 08/01/18  0921   IRON 51 16* 93   IRONSAT 36 7* 37   YOLA  --  302* 571*           Current Medications:  Current Facility-Administered Medications   Medication Dose Route Frequency Provider Last Rate Last Admin    acetaminophen (TYLENOL) tablet 975 mg  975 mg Oral or Feeding Tube Q8H Jailyn Lou MD   975 mg at 07/13/24 1500    acetylcysteine (MUCOMYST) 10 % nebulizer solution 4 mL  4 mL Inhalation BID Velez Reyes, German, MD   4 mL at 07/13/24 1718    [Held by provider] azithromycin (ZITHROMAX) tablet 250 mg  250 mg Oral or Feeding Tube Daily Velez Reyes, German, MD   250 mg at 07/09/24 0828    [Held by provider] calcium carbonate (TUMS) chewable tablet 500 mg  500 mg Oral Once per day on Sunday Tuesday Thursday Saturday Josesito Pratt MD   500 mg at 07/09/24 0834    chlorhexidine (PERIDEX) 0.12 % solution 15 mL  15 mL Mouth/Throat Q12H Chio Cortez MD   15 mL at 07/13/24 0837    fiber modular (BANATROL TF) packet 1 packet  1 packet Per Feeding Tube Q8H Awa Watt MD   1 packet at 07/13/24 1100    heparin ANTICOAGULANT injection 5,000 Units  5,000 Units Subcutaneous Q8H Jailyn Lou MD   5,000 Units at 07/13/24 1500    insulin aspart (NovoLOG) injection (RAPID ACTING)  1-12 Units Subcutaneous Q4H Edin Davis MD   1 Units at 07/12/24 2300    insulin NPH injection 8 Units  8 Units Subcutaneous BID Velez Reyes, German, MD   8 Units at 07/13/24 0836    levalbuterol (XOPENEX) neb solution 0.63 mg  0.63 mg Nebulization 4x Daily Gareth Mosquera MD   0.63 mg at 07/13/24 1718    [Held by provider] magnesium chloride CR tablet 1,070 mg  1,070 mg Oral Once per day on Sunday Tuesday Thursday Saturday Josesito Pratt MD        metoprolol tartrate (LOPRESSOR) tablet 25 mg  25 mg Oral  BID Velez Reyes, German, MD   25 mg at 07/13/24 0838    midodrine (PROAMATINE) tablet 5 mg  5 mg Oral or Feeding Tube Q8H BMT Evelyn Roberts APRN CNP   5 mg at 07/13/24 1500    montelukast (SINGULAIR) tablet 10 mg  10 mg Oral At Bedtime Velez Reyes, German, MD   10 mg at 07/12/24 2107    multivitamin RENAL (RENAVITE RX/NEPHROVITE) tablet 1 tablet  1 tablet Oral or Feeding Tube Daily Awa Watt MD   1 tablet at 07/13/24 0838    pantoprazole (PROTONIX) 2 mg/mL suspension 40 mg  40 mg Per Feeding Tube Daily Randi James MD   40 mg at 07/12/24 2109    posaconazole (NOXAFIL) DR tablet TBEC 300 mg  300 mg Per Feeding Tube Daily Josesito Pratt MD   300 mg at 07/13/24 1500    [Held by provider] predniSONE (DELTASONE) tablet 5 mg  5 mg Oral Daily Rossana Sarabia APRN CNP   5 mg at 07/11/24 0856    predniSONE (DELTASONE) tablet 50 mg  50 mg Oral Daily Rufina Huff, CNP   50 mg at 07/13/24 0837    protein modular (PROSOURCE TF20) packet 1 packet  1 packet Per Feeding Tube Daily Edin Davis MD   1 packet at 07/13/24 0840    QUEtiapine (SEROquel) tablet 50 mg  50 mg Oral or Feeding Tube At Bedtime Kajal Chong APRN CNP   50 mg at 07/12/24 2108    ramelteon (ROZEREM) tablet 8 mg  8 mg Oral At Bedtime Velez Reyes, German, MD   8 mg at 07/12/24 2110    sodium chloride (NEBUSAL) 3 % neb solution 3 mL  3 mL Nebulization BID Velez Reyes, German, MD   3 mL at 07/13/24 1259    sodium chloride (PF) 0.9% PF flush 10 mL  10 mL Intracatheter Q8H Kajal Chong APRN CNP   10 mL at 07/13/24 1648    sodium chloride (PF) 0.9% PF flush 10 mL  10 mL Intracatheter Q8H Kajal Cohng APRN CNP   10 mL at 07/13/24 1648    sulfamethoxazole-trimethoprim (BACTRIM) 400-80 MG per tablet 1 tablet  1 tablet Oral or Feeding Tube Once per day on Tuesday Thursday Saturday Randi James MD   1 tablet at 07/11/24 2008    tacrolimus (GENERIC) suspension 1 mg  1 mg Oral or Feeding Tube BID IS Rufina Huff,  CNP   1 mg at 07/13/24 0838    traZODone (DESYREL) half-tab 25 mg  25 mg Per Feeding Tube At Bedtime Jailyn Lou MD   25 mg at 07/12/24 2225    valGANciclovir (VALCYTE) solution 450 mg  450 mg Per Feeding Tube Once per day on Tuesday Saturday Randi James MD   450 mg at 07/09/24 2015    Vitamin D3 (CHOLECALCIFEROL) tablet 50 mcg  50 mcg Oral Once per day on Monday Wednesday Friday Velez Reyes, German, MD   50 mcg at 07/12/24 0900     Current Facility-Administered Medications   Medication Dose Route Frequency Provider Last Rate Last Admin    dextrose 10% infusion   Intravenous Continuous PRN Velez Reyes, German, MD   Stopped at 07/03/24 1600    dextrose 10% infusion   Intravenous Continuous PRN Awa Watt MD   Stopped at 07/05/24 1400    heparin 10,000 units/10 mL infusion (DIALYSIS USE)  500 Units/hr Hemodialysis Machine Continuous Hardy Tran APRN CNS 0.5 mL/hr at 07/13/24 1901 500 Units/hr at 07/13/24 1901    Stop Heparin 60 minutes before end of treatment   Does not apply Continuous PRN Hardy Tran APRN CNS Milind Yashwant Junghare, MD

## 2024-07-14 NOTE — PROGRESS NOTES
HEMODIALYSIS TREATMENT NOTE    Time: 2224    Data:  Pre Wt: 52.4 kg, bed scale  Desired Wt:  50 kg  Post Wt: -2.4 kg  Ultrafiltration - Post Run Net Total Removed (mL): 2400 mL  Vascular Access Status: patent  Dialyzer Rinse: Light streaked  Total Blood Volume Processed: 79.4  Liters  Total Dialysis (Treatment) Time: 3  Hours  Access: AVF left arm  Needle size: 15 g x 2  Bleeding time: arterial site 10 mins and venous 10 mins     Heparin: 500 units bolus and 500 units/hr, total of 1500 units.     Lab:  No     Assessment:  -A & O x 4, calm and cooperative  - AVF with  bruit and thrill present but edematous  - trached, able to  make needs known  - denies any pain    Interventions:  Ran with K+ 3 Ca++ 3 bath for K serum 3.6 mmol/L and Ca serum 8.4 mg/dL for 3 hrs , accessed with 15 g  X 2 needles, 450 BFR  achieved and maintained, Vital signs monitored every 15 min and PRN, remained asymptomatic, pt remained hemodynamically stable throughout hemodialysis.  Post rinsed back successfully, needles pulled with ease,  pressure dressing applied and secured with gauze once homeostasis achieved.    Meds given: lidocaine 1% 0.5ml ID x 2. Epo 4,000 units, IV    See HD flow sheet and MAR for details.    Handoff report given to PCN Dulce and endorsed pt with stable condition..                Plan:    Per renal team

## 2024-07-15 NOTE — PROGRESS NOTES
Care Management Follow Up    Length of Stay (days): 27    Expected Discharge Date:  TBD      Concerns to be Addressed: LTACH   Patient plan of care discussed at interdisciplinary rounds: Yes    Anticipated Discharge Disposition: LTACH      Additional Information:     Per discussion with MICU 2 team  updated Tabby P[facility] that anticipating that patient will be ready for the transfer 7/18.   No LTACH beds today.   Anticipating bed availability pending discharges later this week.     Saima Lucio, MSN, MA, CCM, RN  4C/4A/4E ICU Care Coordinator  Phone:[phone]  Pager: [phone]    For Weekend & Holiday on call RN Care Coordinator:  [place] [facility] [place] ([phone]) Saturday & Sunday; [phone]) [facility] Recognized Holidays   Weekend 4C/4A/4E ICUs /6A Care Coordinator  Pager: [phone]

## 2024-07-15 NOTE — PROVIDER NOTIFICATION
07/15/24 1214   Weaning Assessment   Spontaneous Respiratory Rate 42 breaths/min   Spontaneous Tidal Volume (mL) 159 mL   Spontaneous Minute Ventilation 11 mL   RSBI 264.15   Weaning trial discontinued due to: Increased SOB   Wean Start Time  0929   Wean End Time  1214   Wean Time Calculated (min) 165     Pt placed on a PS trial 10/+5 at 0920 this morning and was able to tolerate 2 hours and 45 minutes before becoming very SOB. The second PS trial 10/+5 was 1639 and will go as long as she tolerates. Please see flowsheets for further information.

## 2024-07-15 NOTE — PLAN OF CARE
ICU End of Shift Summary. See flowsheets for vital signs and detailed assessment.    Changes this shift: A/Ox4.FiO2 30%, PEEP 5, Rate 18, . Minimal secretions through trach. Denies pain.BM x1      Plan: Continue P/S.       Overall Patient Progress: no change    Plan:     Plan of Care Reviewed With: patient    Overall Patient Progress: improving

## 2024-07-15 NOTE — PROGRESS NOTES
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  The patient was admitted on 24 for progressive hypoxia with dyspnea and worsening hypercapnia in ED requiring intubation likely d/t post-obstructive PNA 2/2 right bronchus stenosis.  S/p IP bronch () with laser tissue debulking RMB stenosis, airway balloon dilation of RMB and BI, and placement of stent RMB to BI and bifurcating stent in RUL.  S/p CRRT (-), iHD resumed .  Recurrence of paroxysmal afib with RVR first noted . S/p trach with ENT on 7/3 and GJ tube with IR on .  Treating empirically for possible immune/inflammatory process vs organizing pneumonia (given GG changes on CT) with steroid burst/taper.  Gradual improvement in PST on ventilator.  Discharge to LTACH  (pending bed availability) pending plan for steroid taper following repeat CT.      Today's recommendations:  - Continue steroid burst with taper plan TBD based on next CT scan (ordered )  - Ongoing PST BID per MICU  - Resume PTA azithromycin at decreased dose (had been held for prolonged QTc)  - Tacrolimus clarified today to be via G tube exclusively (has been likely receiving via J tube), level today to trend (only one dose after increase), defer dose adjustment, repeat level  when steady state  (ordered)  - Posaconazole per transplant ID   - CMV weekly monitoring (qTuesday) and VGCV ppx with increased steroid courses, duration TBD     Septic shock:  Acute on chronic hypoxic/hypercapneic respiratory failure:  Post-obstructive multi-lobar PNA:  Right bronchial stenosis with RML collapse: Admitted with 2 weeks of progressive hypoxia, dyspnea, congested cough, and fatigue.  Chronic hypoxia with 2L NC, increased from 3 to 4L over this time with acute decompensation on day of admission associated with hypercapnia. Intubated in the ED. Negative comprehensive ID workup on admission. Procal mildly elevated at 0.24 initially (then elevated to 1.77 6/20), LA normal, but febrile to 100.7.  TTE on admission grossly normal.  CT with RUL/RLL consolidative opacities, RML collapse, and narrowing of BI and distal RLL bronchus.  Bronch (6/19) with severe stenosis starting at right anastomosis, inspection with smaller scope revealing for extensive RLL mucous plugging.  S/p IP bronch (6/20) with laser tissue debulking RMB stenosis, airway balloon dilation of RMB and BI, and placement of stent RMB to BI and bifurcating stent in RUL.  Respiratory cultures with Steno, VSE, VRE, Aspergillus ochraceus, and Elidia guilliermondii.  Chest CT (6/29) with multifocal panlobar opacities and small bibasilar pleural effusions.  S/p trach with ENT on 7/3 and GJ tube with IR on 7/5.  S/p stress dose steroids 6/21-7/5.  Chest CT (7/8) with waxing and waning mostly improved right lung consolidation and new diffuse GGO >BLL, increased GGO in KONRAD. Started on steroid burst and overall improving.  Gradual improvement in PST.   - ABX: none currently; s/p linezolid (6/25-7/5), minocycline (6/20-7/5, Steno), ceftazidime (6/25-6/28), meropenem (6/21-6/24), daptomycin (6/21-6/24), levofloxacin (6/21-6/23), Zosyn (6/18-6/21), azithromycin 500 mg daily (6/18-6/20), and vancomycin (6/18)   - Steroid burst (7/11) with prednisone 1 mg/kg daily (50 mg) with  taper plan TBD based on next CT scan (ordered 7/17)  - Nebs: levalbuterol QID, Mucomyst 10% BID alternating with 3% HTS (6/22), will need to be on NS nebs BID upon discharge per IP  - Vent management and weaning per MICU  - Chest CT non-contrast on 7/17 (ordered)     S/p bilateral sequential lung transplant (BSLT) for ILD:   Progressive CLAD: Most recent OP visit in February.  DSA negative 7/3 (last positive noted 2018).  Repeat ImmuKnow (6/19) 87, very low but IST adjustment deferred per Dr. Graham given acuity and steroids (after ImmuKnow level).  Repeat Prospera remains high at 2.26 on 7/3 possibly related to recent infection, now on steroids. ImmuKnow remains low at 89 on 7/8, defer IST adjustment at this time.  - PTA Singulair  - Breo inhaler (on hold with trach)  - Azithromycin (held 7/10 for prolonged QTc) resumed today at decreased dose of 125 mg daily, continue to monitor QTc     Immunosuppression:  On 2-drug IST since November d/t leukopenia and recurrent infections  - Tacrolimus 1 mg qAM / 1.5 mg qPM (increased 7/14), clarified today to be via G tube exclusively (has been likely receiving via J tube).  Goal level 8-10.  Level today to trend (only one dose after increase), defer dose adjustment.  Repeat level 7/17 when steady state (ordered).  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  S/p ABX as above.     Aspergillus ochraceus:  H/o Aspergillus empyema: S/p empyema drainage and ampho B instillation.  Previously on voriconazole, transitioned to posaconazole and managed by transplant ID as OP with last visit 3/13.  Calcified fungal elements remain with additional recurrent effusion (likely associated with fluid disturbances r/t iHD), but surgical intervention previously deferred d/t risk.  Posaconazole level 2.5 on 6/19.  Susceptibility testing done on  fungal bronch culture 6/19 per transplant ID.  S/p micafungin 150 mg daily (6/25-7/9) per transplant ID.  - Posaconazole 300 mg daily chronically per transplant ID (signed off 7/9) with plan for transplant ID follow up ~6-8 weeks      H/o CMV viremia: Recurrent CMV viremia historically.  VGCV ppx most recently stopped 4/12.  Recent CMV low positive at 46 (6/12), <35 (6/18), 39 (6/25, 7/2) and now negative 7/9.  - CMV weekly monitoring (qTuesday) and VGCV ppx (6/24, renally dosed) with increased steroid courses, duration TBD     EBV viremia: S/p rituximab 12/13/23 x1 dose.  Most recent EBV negative 6/18.     Dilated CBD:  Suspected acute cholecystitis: MRCP (6/29) with CBD dilation with stones and cholelithiasis without cholecystitis.  GI signed off 6/30, recommending general surgery consult if RUQ pain recurs.     ESRD on iHD: Kidney evaluation started as OP.  On qMWF iHD schedule, no missed sessions.  Transitioned to CRRT on 6/20, stopped 7/2 and iHD resumed 7/4.  Management per renal and MICU.     Pancytopenia: WBC 2.2-3.2 since 7/7.  Platelets down to 45 on 7/9.  On VGCV as above.  Also intermittently requiring pRBC.  Peripheral smear (7/9) with moderate normochromic, normocytic anemia with no increase in erythrocyte regeneration, slight leukopenia, marked thrombocytopenia with normal platelet morphology, and no overt evidence of dysplasia. CBCstable.  - CBC with differential daily, consider G-CSF if ANC </=0.8     VRE urine culture: Noted 6/19, >100k GNB and VRE faecium, ABX as above with management per transplant ID and MICU.     We appreciate the excellent care provided by the MICU team.  Recommendations communicated via in person rounding and this note.  Will continue to follow along closely, please do not hesitate to call with any questions or concerns.    Patient discussed with Dr. James.    Hina Huff, DNP, APRN, CNP  Inpatient Nurse Practitioner  Pulmonary CF/Transplant     Subjective & Interval  "History:     Improvement in PST tolerance with 2 hours at 10/5 for first trial, second trial not clearly documented.  On again this morning and tolerating well.  Minimal secretions via trach.  Midodrine stopped yesterday, last iHD run 2 days ago.    Review of Systems:     C: No fever, no chills, no change in weight  INTEGUMENTARY/SKIN: No rash or obvious new lesions  ENT/MOUTH: No nasal congestion or drainage  RESP: See interval history  CV: No chest pain, no palpitations, no peripheral edema, no orthopnea  GI: No nausea, no vomiting, no change in stools, no reflux symptoms  : No dysuria  MUSCULOSKELETAL: No myalgias, no arthralgias  ENDOCRINE: Blood sugars with adequate control  NEURO: No headache, no numbness or tingling  PSYCHIATRIC: Mood stable    Physical Exam:     All notes, images, and labs from past 24 hours (at minimum) were reviewed.    Vital signs:  Temp: 98.3  F (36.8  C) Temp src: Oral BP: 98/69 Pulse: 74   Resp: 21 SpO2: 94 % (Simultaneous filing. User may not have seen previous data.) O2 Device: Mechanical Ventilator Oxygen Delivery: 6 LPM Height: 160 cm (5' 3\") Weight: 50.8 kg (111 lb 15.9 oz)  I/O:   Intake/Output Summary (Last 24 hours) at 7/15/2024 0707  Last data filed at 7/15/2024 0400  Gross per 24 hour   Intake 1055 ml   Output --   Net 1055 ml     Constitutional: Sitting up in a chair, spouse at bedside, in no apparent distress.   HEENT: Eyes with pink conjunctivae, anicteric.  Oral mucosa moist without lesions.  Trach cdi.  PULM: Diminished bases bilaterally.  Coarse crackles t/o, no rhonchi, no wheezes.  Non-labored breathing on PS 10/5/30.  CV: Normal S1 and S2.  RRR.  No murmur, gallop, or rub.  No peripheral edema.   ABD: NABS, soft, nontender, nondistended.   PEG/J cdi, G clamped, TF to J tube.  MSK: Moves all extremities.   NEURO: Alert and conversant by mouthing words.   SKIN: Warm, dry. fragile.  No rash on limited exam.   PSYCH: Mood stable.     Data:     LABS    CMP:   Recent " Labs   Lab 07/14/24  2314 07/14/24  1859 07/14/24  1242 07/14/24  0816 07/14/24  0656 07/13/24  1012 07/13/24  0706 07/12/24  0909 07/12/24  0504 07/11/24  0831 07/11/24  0518 07/10/24  0418 07/10/24  0416 07/09/24  0415 07/09/24  0408   NA  --   --   --   --  133*  --  132*  --  134*  --  131*  --  130*   < > 129*   POTASSIUM  --   --   --   --  3.6  --  3.6  --  3.3*  --  3.8  --  3.3*   < > 3.4   CHLORIDE  --   --   --   --  97*  --  95*  --  97*  --  98  --  98   < > 97*   CO2  --   --   --   --  27  --  25  --  27  --  25  --  26   < > 21*   ANIONGAP  --   --   --   --  9  --  12  --  10  --  8  --  6*   < > 11   * 198* 126* 177* 187*   < > 159*   < > 159*   < > 122*   < > 150*   < > 129*   BUN  --   --   --   --  33.9*  --  65.2*  --  38.5*  --  57.5*  --  38.1*   < > 80.2*   CR  --   --   --   --  1.50*  --  2.54*  --  1.84*  --  2.52*  --  1.80*   < > 3.15*   GFRESTIMATED  --   --   --   --  39*  --  21*  --  31*  --  21*  --  32*   < > 16*   CHELSEY  --   --   --   --  8.4*  --  8.4*  --  8.3*  --  8.6*  --  8.4*   < > 8.3*   MAG  --   --   --   --  1.7  --  1.9  --  1.8  --   --   --  1.9   < > 1.9   PHOS  --   --   --   --  3.3  --  4.8*  --  3.1  --   --   --   --   --  5.3*   PROTTOTAL  --   --   --   --   --   --   --   --   --   --  5.1*  --   --   --   --    ALBUMIN  --   --   --   --   --   --   --   --   --   --  2.6*  --   --   --   --    BILITOTAL  --   --   --   --   --   --   --   --   --   --  0.4  --   --   --   --    ALKPHOS  --   --   --   --   --   --   --   --   --   --  282*  --   --   --   --    AST  --   --   --   --   --   --   --   --   --   --  30  --   --   --   --    ALT  --   --   --   --   --   --   --   --   --   --  13  --   --   --   --     < > = values in this interval not displayed.     CBC:   Recent Labs   Lab 07/15/24  0641 07/14/24  0656 07/13/24  0706 07/12/24  050   WBC 2.5* 2.4* 2.9* 2.5*   RBC 2.38* 2.27* 2.23* 2.23*   HGB 7.8* 7.6* 7.3* 7.2*   HCT 25.5* 23.4*  "22.9* 22.5*   * 103* 103* 101*   MCH 32.8 33.5* 32.7 32.3   MCHC 30.6* 32.5 31.9 32.0   RDW 25.0* 24.3* 23.6* 22.0*    153 137* 97*       INR: No lab results found in last 7 days.    Glucose:   Recent Labs   Lab 07/14/24  2314 07/14/24  1859 07/14/24  1242 07/14/24  0816 07/14/24  0656 07/13/24  2315   * 198* 126* 177* 187* 172*       Blood Gas: No lab results found in last 7 days.    Culture Data No results for input(s): \"CULT\" in the last 168 hours.    Virology Data:   Lab Results   Component Value Date    FLUAH1 Not Detected 06/18/2024    FLUAH3 Not Detected 06/18/2024    BA2954 Not Detected 06/18/2024    IFLUB Not Detected 06/18/2024    RSVA Not Detected 06/18/2024    RSVB Not Detected 06/18/2024    PIV1 Not Detected 06/18/2024    PIV2 Not Detected 06/18/2024    PIV3 Not Detected 06/18/2024    HMPV Not Detected 06/18/2024    HRVS Negative 01/24/2021    ADVBE Negative 01/24/2021    ADVC Negative 01/24/2021    ADVC Negative 12/23/2020    ADVC Negative 10/07/2019       Historical CMV results (last 3 of prior testing):  Lab Results   Component Value Date    CMVQNT Not Detected 07/09/2024    CMVQNT <35 (A) 06/18/2024    CMVQNT Not Detected 03/05/2024     Lab Results   Component Value Date    CMVLOG 1.6 07/02/2024    CMVLOG 1.6 06/25/2024    CMVLOG <1.5 06/18/2024       Urine Studies    Recent Labs   Lab Test 06/19/24  1214 01/24/21  1729   URINEPH 6.5 5.0   NITRITE Negative Negative   LEUKEST Large* Moderate*   WBCU >182* 34*       Most Recent [place] Pulmonary Function Testing (FVC/FEV1 only)  FVC-Pre   Date Value Ref Range Status   02/14/2024 0.85 L    12/19/2023 1.04 L    11/21/2023 0.85 L    10/24/2023 0.96 L      FVC-%Pred-Pre   Date Value Ref Range Status   02/14/2024 29 %    12/19/2023 36 %    11/21/2023 29 %    10/24/2023 33 %      FEV1-Pre   Date Value Ref Range Status   02/14/2024 0.80 L    12/19/2023 0.89 L    11/21/2023 0.78 L    10/24/2023 0.87 L      FEV1-%Pred-Pre   Date Value Ref " Range Status   02/14/2024 34 %    12/19/2023 38 %    11/21/2023 33 %    10/24/2023 37 %        IMAGING    No results found for this or any previous visit (from the past 48 hour(s)).

## 2024-07-15 NOTE — PLAN OF CARE
ICU End of Shift Summary. See flowsheets for vital signs and detailed assessment.    Changes this shift: Pressure supported x2 successfully, tolerated very well, little to no distress today. VSS.  RIJ trialysis HD/CRRT catheter removed, pressure dressing in place.  Pt worked with therapy in the morning, up to the chair, up to commode with walker.  Significant pain to pressure points on the commode.  Less loose BMs today. No complaints of pain, tylenol transitioned from scheduled to PRN.    Plan: OT suggested trying a z-flow pillow to cushion the commode.  Plans to continue pressure support with gradual increases in length of time as tolerated.  CT on Wednesday to inform next steps and LTACH discharge planning.    Problem: Adult Inpatient Plan of Care  Goal: Plan of Care Review  Description: The Plan of Care Review/Shift note should be completed every shift.  The Outcome Evaluation is a brief statement about your assessment that the patient is improving, declining, or no change.  This information will be displayed automatically on your shift  note.  Outcome: Progressing  Flowsheets (Taken 7/14/2024 2009)  Outcome Evaluation: Patient tolerated Pressure support x2 today.  1st just over 2 hrs 10/5. 2nd continuing into night shift.  Plan of Care Reviewed With:   patient   spouse  Overall Patient Progress: improving   Goal Outcome Evaluation:      Plan of Care Reviewed With: patient, spouse    Overall Patient Progress: improvingOverall Patient Progress: improving    Outcome Evaluation: Patient tolerated Pressure support x2 today.  1st just over 2 hrs 10/5. 2nd continuing into night shift.

## 2024-07-15 NOTE — PROGRESS NOTES
ICU Daily Rounding Checklist     Checklist Response Notes   Can sedation be reduced?  NA    Can analgesia be reduced? No    Is delirium being assessed, addressed and prevented? Yes    Spontaneous awakening trial and/or Spontaneous breathing trial candidate?  Yes Pressure supporting daily   Total fluid balance goal reviewed?  Yes Target Goals:        Per HD [12h]             Per HD [24h]   Is the patient at goals for lung protective ventilation? Yes    Head of bed elevation (30 degrees)? Yes    Skin breakdown assessment (prevention) completed? Yes    Is enteral nutrition at goal? Yes    Is blood glucose at goal? Yes    Deep venous thrombosis prophylaxis? Yes      Gastric ulcer prophylaxis?  Yes If coagulopathy (INR-1.5 PTT2x normal. Ph<50k), mechanical ventilation 48hr, history of GI bleed/ulcer within past year. TBL, SCI, or burn, or if >= 2 minor risk factors (sepsis, ICU stay 1 week, occult GI bleed > 6 days. glucocorticoid therapy, NSAID use, antiplatelet use)   Can Antibiotics be narrowed or discontinued? NA    Early mobility candidate and physical therapy consulted? Yes    Is rivas catheter needed? No    Is central venous/arterial catheter needed? NA    Has the family been updated? Yes    Are the patient's goals of care and code status current? Yes      Kaitlynn Mcadams MD  Internal Medicine PGY3

## 2024-07-15 NOTE — PROGRESS NOTES
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 Nephrology consulted for management of HD while admitted.      Interval History :   Mrs Blue is planned for HD on TTS schedule, planned for run tomorrow. Working on discharge planning and may end up on MWF so will be mindful of this as plans come together. No issues with fistula on last run, pulled temp line.       Assessment & Recommendations:   ESRD-ESKD: pt dialyzes MWF at [facility] (ph [phone], f [phone]) with Dr. Glasgow. Run time: 3.5 hrs. EDW 52.5 kg. Access: L AVF. +heparin 1,500u bolus.       -Holding on run today, plan for tomorrow on TTS for now unless discharge planning dictates otherwise.                  -No need for new dialysis consent, continuing long term HD for ESRD.                -Appreciate IR doing plasty 7/8 to fistula, temp line pulled.       Outpt Rx:       Volume-Wt as low as 49kg during her course (although may have been dry at the time as she needed some neosynephrine), EDW likely a bit lower with ICU stay ~50-51kg.      Pulm-Admitted with hypercapnic resp failure, treated for PNA. Trached, progressing well.       Electrolytes-Stable.     BMD-No acute issues.      Anemia-Hgb 7.8, last PRBC's 6/26 on venofer 50mg weekly but will hold while treating for infection. On Mircera 60mcg [id number], will cover with short term 4k Epo with runs while admitted when stable enough for HD.       Nutrition-Novasource renal      Time spent: 40 minutes on this date of encounter for chart review, physical exam, medical decision making and co-ordination of care.     "  Recommendations were communicated to primary team via verbal communication.     Hardy Tran, ADRIÁN CNS  Clinical Nurse Specialist  [phone]    Review of Systems:   I reviewed the following systems:  Gen: No fevers or chills  CV: No CP at rest  Resp: No SOB at rest  GI: No N/V    Physical Exam:   I/O last 3 completed shifts:  In: 1085 [NG/GT:545]  Out: -    BP 98/69   Pulse 74   Temp 98.3  F (36.8  C) (Oral)   Resp 21   Ht 1.6 m (5' 3\")   Wt 50.8 kg (111 lb 15.9 oz)   LMP 06/07/2014 (Exact Date)   SpO2 94%   BMI 19.84 kg/m       GENERAL APPEARANCE: Vent via trach   EYES: No scleral icterus  Pulmonary: Stable on vent  CV: Regular rhythm, normal rate   - Edema none  GI: soft, nontender, normal bowel sounds  MS: no evidence of inflammation in joints, no muscle tenderness  : No Basilio  SKIN: no rash, warm, dry  NEURO: No focal deficits    Labs:   All labs reviewed by me  Electrolytes/Renal -   Recent Labs   Lab Test 07/15/24  0856 07/15/24  0641 07/14/24  2314 07/14/24  0816 07/14/24  0656 07/13/24  1012 07/13/24  0706   NA  --  133*  --   --  133*  --  132*   POTASSIUM  --  3.1*  --   --  3.6  --  3.6   CHLORIDE  --  95*  --   --  97*  --  95*   CO2  --  25  --   --  27  --  25   BUN  --  59.7*  --   --  33.9*  --  65.2*   CR  --  2.23*  --   --  1.50*  --  2.54*   * 157* 169*   < > 187*   < > 159*   CHELSEY  --  8.3*  --   --  8.4*  --  8.4*   MAG  --  1.9  --   --  1.7  --  1.9   PHOS  --  4.5  --   --  3.3  --  4.8*    < > = values in this interval not displayed.       CBC -   Recent Labs   Lab Test 07/15/24  0641 07/14/24  0656 07/13/24  0706   WBC 2.5* 2.4* 2.9*   HGB 7.8* 7.6* 7.3*    153 137*       LFTs -   Recent Labs   Lab Test 07/11/24  0518 07/02/24  0359 07/01/24  1637 07/01/24  0346   ALKPHOS 282* 176*  --  173*   BILITOTAL 0.4 0.6  --  0.6   ALT 13 37  --  36   AST 30 26  --  25   PROTTOTAL 5.1* 5.4*  --  5.0*   ALBUMIN 2.6* 2.8* 2.7* 2.6*       Iron Panel -   Recent Labs   Lab " Test 02/03/21  0415 12/13/18  1033 08/01/18  0921   IRON 51 16* 93   IRONSAT 36 7* 37   YOLA  --  302* 571*           Current Medications:  Current Facility-Administered Medications   Medication Dose Route Frequency Provider Last Rate Last Admin    acetylcysteine (MUCOMYST) 10 % nebulizer solution 4 mL  4 mL Inhalation BID Velez Reyes, German, MD   4 mL at 07/15/24 0926    [Held by provider] azithromycin (ZITHROMAX) half-tab 125 mg  125 mg Per J Tube Daily Rufina Huff CNP        chlorhexidine (PERIDEX) 0.12 % solution 15 mL  15 mL Mouth/Throat Q12H Chio Cortez MD   15 mL at 07/15/24 0845    fiber modular (BANATROL TF) packet 1 packet  1 packet Per Feeding Tube Q8H Awa Watt MD   1 packet at 07/14/24 1841    heparin ANTICOAGULANT injection 5,000 Units  5,000 Units Subcutaneous Q8H Jailyn Lou MD   5,000 Units at 07/15/24 0845    insulin aspart (NovoLOG) injection (RAPID ACTING)  1-12 Units Subcutaneous Q4H Edin Davis MD   2 Units at 07/14/24 2314    insulin NPH injection 8 Units  8 Units Subcutaneous BID Velez Reyes, German, MD   8 Units at 07/15/24 0846    levalbuterol (XOPENEX) neb solution 0.63 mg  0.63 mg Nebulization 4x Daily Gareth Mosquera MD   0.63 mg at 07/15/24 0926    metoprolol tartrate (LOPRESSOR) tablet 25 mg  25 mg Per J Tube BID Rufina Huff CNP        montelukast (SINGULAIR) tablet 10 mg  10 mg Per J Tube At Bedtime Rufina Huff CNP        [START ON 7/16/2024] multivitamin RENAL (RENAVITE RX/NEPHROVITE) tablet 1 tablet  1 tablet Per J Tube Daily Rufina Huff CNP        pantoprazole (PROTONIX) 2 mg/mL suspension 40 mg  40 mg Per J Tube Daily Rufina Huff CNP        posaconazole (NOXAFIL) DR tablet TBEC 300 mg  300 mg Per Feeding Tube Daily Josesito Pratt MD   300 mg at 07/14/24 4768    [Held by provider] predniSONE (DELTASONE) tablet 5 mg  5 mg Oral Daily Rossana Sarabia APRN CNP   5 mg at 07/11/24  0856    [START ON 7/16/2024] predniSONE (DELTASONE) tablet 50 mg  50 mg Per J Tube Daily Rufina Huff CNP        [START ON 7/16/2024] Prosource TF20 ENfit Compatibl EN LIQD (PROSOURCE TF20) packet 1 packet  1 packet Per J Tube Daily Rufina Huff CNP        QUEtiapine (SEROquel) tablet 25 mg  25 mg Per J Tube At Bedtime Rufina Huff CNP        ramelteon (ROZEREM) tablet 8 mg  8 mg Per J Tube At Bedtime Rufina Huff CNP        sodium chloride (NEBUSAL) 3 % neb solution 3 mL  3 mL Nebulization BID Velez Reyes, German, MD   3 mL at 07/14/24 2014    sodium chloride (PF) 0.9% PF flush 10 mL  10 mL Intracatheter Q8H Kajal Chong APRN CNP   10 mL at 07/14/24 2324    [START ON 7/16/2024] sulfamethoxazole-trimethoprim (BACTRIM) 400-80 MG per tablet 1 tablet  1 tablet Per J Tube Once per day on Tuesday Thursday Saturday Rufina Huff CNP        tacrolimus (GENERIC) suspension 1 mg  1 mg Per G Tube QAM Rufina Huff CNP   1 mg at 07/15/24 0847    And    tacrolimus (GENERIC) suspension 1.5 mg  1.5 mg Per G Tube QPM Rufina Huff CNP        traZODone (DESYREL) half-tab 25 mg  25 mg Per J Tube At Bedtime Rufina Huff CNP        [START ON 7/16/2024] valGANciclovir (VALCYTE) solution 450 mg  450 mg Per J Tube Once per day on Tuesday Saturday Rufina Huff CNP        [START ON 7/17/2024] Vitamin D3 (CHOLECALCIFEROL) tablet 50 mcg  50 mcg Per J Tube Once per day on Monday Wednesday Friday Rufina Huff CNP         Current Facility-Administered Medications   Medication Dose Route Frequency Provider Last Rate Last Admin    dextrose 10% infusion   Intravenous Continuous PRN Velez Reyes, German, MD   Stopped at 07/03/24 1600

## 2024-07-15 NOTE — PROGRESS NOTES
MEDICAL ICU PROGRESS NOTE  07/15/2024      Date of Service (when I saw the patient): 07/15/2024    ASSESSMENT: Kecia Blue is a 63 year old female with PMH ILD s/p bilateral lung transplant (2018) c/b recurrent right bronchial stenosis s/p repeat balloon dilation/stenting, repeat opportunistic pneumonia (PJP 2021, aspergillus/stenotrophomonas 11/2023) and viremias (EBV, CMV), and ESRD 2/2 tacrolimus toxicity on HD who was admitted on 6/18/2024 for acute hypercapnic respiratory failure 2/2 tracheal stenosis and pneumonia.  Status post airway stent placement by IP on 6/20. Hospital course complicated by hypotension, delirium, and prolonged respiratory failure. Tracheostomy placement on 7/3 and PEGJ tube placement with IR on 7/5. Now persistent respiratory failure with minimal progress on PST, treating empirically for immune/inflammatory process. Discharge to LTACH pending plan for steroid taper.     Changes today:  - Plan for hemodialysis tomorrow  - Per pulm transplant, continue prednisone 50 mg daily, until CT on 7/17; taper plan TBD imaging  - Resume azithromycin 125 mg daily per pulm transplant, continue to monitor Qtc   - Continue BID SBT trials on pressure support 10/5 as tolerated  - Repeat chest CT on 7/17  - Per transplant service, tacrolimus to be administered through G tube, all other meds through J tube    PLAN:    Neuro:  #Pain   - Acetaminophen 1 gram q8h prn       #Toxic metabolic encephalopathy, resolved  Ongoing lethargy in setting of sepsis and delirium in the setting of high-dose steroid therapy. Patient reports improved sleep on current medications. Following commands this morning.   - Continue quetiapine to 25 mg at bedtime   - Ramalteon at bedtime  - trazodone 25mg for sleep  - delirium precautions  - Do not disturb order overnight to promote sleep    #Anxiety, improving  Patient has been having anxiety regarding pressure support trials and weaning from the ventilator.  and  patient would like to talk to somebody while in the hospital. Will plan to consult Health Psychology tomorrow (as not here on weekends).   - Health Psychology consulted, seeing patient once per week while admitted- improved    Pulmonary:  #Acute on chronic hypoxic hypercapnic respiratory failure, improving  #concern for possible immune/inflammatory process  #HAP, Stenotrophomonas maltophilia, Enterococcus faecalis, and Candida guilliermondi complex  #Severe pulmonary edema  #Acute respiratory distress syndrome, resolved  Presented to the ED on 6/18/2024 with acute on chronic hypoxic hypercapnic respiratory failure. Transferred to MICU and intubated on overnight on 6/18. Workup significant for Stenotrophomonas, Enterococcus, and Candida pneumonia. Course was c/b progression to ARDS (6/21-22) with elevated plateau pressures.  CXR (6/26) with similar pulmonary opacities compared to 6/24, left > right.  The etiology of this groundglass opacity was unclear but ddx includes rejection vs. infection vs. volume overload. Tracheostomy placed on 7/3 to assist with ventilator weaning. CT chest w/o contrast 7/8: Waxing and waning mostly improved right lung consolidation now with diffuse groundglass organizing opacities mostly in the lower lobe an middle lobe more than the upper lobe. Slight improvement in the peribronchiolar consolidation in the left lower lobe but there is increased groundglass organization in the left upper lobe.    - s/p tracheostomy 7/3-> ENT removed sutures and perform trach dressing change 7/8  - Pulmonary toilet  - Mucomyst BID  - Hypertonic saline BID, alternate with mucomyst  - Albuterol neb QID  - Antibiotics, as below  - Volume management with iHD  - Continue Daily SBT (AM and PM) on pressure support 10/5  - pulm transplant following    Vent Mode: CMV/AC  (Continuous Mandatory Ventilation/ Assist Control)  FiO2 (%): 30 %  Resp Rate (Set): 18 breaths/min  Tidal Volume (Set, mL): 320 mL  PEEP (cm H2O):  5 cmH2O  Pressure Support (cm H2O): 10 cmH2O  Resp: 21      #Recurrent right middle bronchus stenosis s/p dilation and stent (6/20/2024), stable  Has history (bronchoscopy 2/15/24) demonstrating narrowing of right mainstem transplant anastomosis and bronchus intermedius. CT chest (6/18/2024) and bronchoscopy (6/19/2024) demonstrated occlusion of right middle bronchus. With concern for post-obstructive pneumonia, interventional pulmonology was consulted, and completed bronchoscopy (6/20/2024) with tissue debulking, right middle bronchus balloon dilation to 11 mm, stent placement in right main bronchus, and bifurcating cast placement in right upper bronchus. IP pulm re-evaluated bronchial stents with BAL and noted that they were open.    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   - Prospera (7/3) pending  - DSA (7/3) pending  - PTA nebs  - Fluticasone-viilanterol (breo ellipta) every day - HOLD with respiratory infection  - Montelukast every day   - Immunosuppressants per Tx Pulm:   - PTA Tacrolimus (dosing per tx pulm)  - PTA Prednisone 5 mg daily   - PTA azathioprine - HOLD per Transplant pulmonology with repeat infections  - Peripheral smear (7/9) pending for pancytopenia  - Antibiotic prophylaxis   - Bactrim MWF for PJP ppx (monitor need to adjust pending HD plan)  - CMV weekly monitoring (qTuesday)  - Azithromycin 125mg per pulmonary, continue to monitor QTc  - Continue VGCV ppx (renally dosed, monitor need to adjust pending HD plan) with tentative plan for 3 weeks beyond completion of stress dose steroids   - Per pulm transplant: Continue steroid burst with prednisone 1 mg/kg, currently 50 mg daily, until CT on 7/17; taper plan TBD  imaging, but can start planning for LTACH discharge Thurs  - Hold PTA prednisone dose given burst dose      Cardiovascular:  #Septic shock, resolved  On 6/19/2024, developed sepsis (hypotension 80s/50s, tachypnea 24) in the setting of stenotrophomonas pneumonia/enterococcus faecium VRE UTI. Etiology of shock likely distributive iso sepsis; less likely hypovolemic (not pulling volumes with CRRT), medication-associated (weaned off of propofol gtt) or obstructive (low suspicion for PE, echo 6/19 without evidence of cardiac dysfunction). Occasional episodes of hypotension overnight, but self-resolving and have not occurred in a couple days.  - s/p SDS & pressors and midodrine, off of all support agents  - MAPs 70s-80s, stable    #Demand Ischemia, resolved    Presented with elevated troponin (peak 499). Not associated with chest pain or hypoxia. EKG without ST elevations/depressions or T wave inversions. Suspect demand ischemia in the setting of sepsis. Will continue to monitor symptoms and continuous telemetry. EKG 7/2 sinus rhythm with PACs.     #Paroxysmal A-Fib, improved  #Pulmonary Hypertension   #Transient arhythmia with palpitations  History of pulmonary hypertension (45 mmHg, echo 10/2023) in the setting of ILD and paroxysmal afib on PTA metoprolol tartrate BID. Not on anticoagulation for afib. Transient unspecified arhythmia overnight (7/9) with palpitations. EKG 7/9 sinus rhythm with fusion complexes and known RBBB. Repeat EKG 7/10 showing sinus rhythm with no RBBB or fusion complexes. Patient given magnesium 1g and 20mEq K+ for K 3.3 this am. No further episodes of palpitations. Currently in sinus rhythm.  - continue PTA metoprolol tartrate 25mg BID  - CTM    #Prolonged Qtc  Repeat EKG 7/13 with QTC of 483.   - Azithromycin ppx restarted per pulm transplant  - CTM    GI/Nutrition:  #Nutrition  PEG-J placed by IR on 7/5, continue tube feeds.   - Nutrition consulted  - Tube feeds 30ml/hr; at  goal    #Diarrhea  On 6/21, had 8 bowel movements. Occurred in the setting of scheduled bowel regimen and starting new antibiotics (meropenem, levofloxacin). C diff negative. Ongoing diarrhea x 2 days now.  - Holding PRN bowel regimen  - Miralax PRN   - Senna prn  - Trial of Nutrisource Fiber and monitor-3 packets daily per nutrition recs  - Ongoing loose stools  - Stool enteric bacteria and viral PCR panel pending  - Consider loperamide if stool pathogen testing negative    #Cholelithiasis with gallbladder wall thickening, resolved  During admission patient found to have up trending LFTs and fever in setting of GB wall thickening on CT abdomen/pelvis, now resolved. MRCP completed showing borderline dilated CBD up to 1.1 cm, no significant intrahepatic biliary dilation, no evidence of choledocholithiasis, no evidence of acute cholecystitis.    Renal/Fluids/Electrolytes:  #ESRD on iHD (M,W,F)  History of ESRD 2/2 Tacrolimus toxicity on HD (MWF). With soft blood pressures, placed RIJ (6/20/2024) and started CRRT (6/20/2024). US of AVF 7/5: arterial inflow patent without inflow stenosis, normal diameter of anastamosis, venous outflow elevated velocity at subclavian- suggestive of recurrent stenosis in venous outflow (area of prior angioplasty in March). IR consulted who noted that IR fistulogram not required at that time, and was able to run iHD on 7/6 off her fistula. Currently having some swelling in same arm that fistula is placed in  - Nephrology consulted and following for ESRD  - will follow recommendations for need of HD  - Renally-dosed medications  - BMP daily  - HD yesterday, 2.4L removed  - Remove temporary HD line today      Endocrine:  #Risk for hyperglycemia with high dose steroids  Stress dose steroids discontinued, resume PTA prednisone 5 mg daily. Blood sugars have remained in appropriate range.  - NPH 8U BID  -CTM     ID:  #HAP, Stenotrophomonas maltophilia, Enterococcus faecalis, Aspergillus, and  Candida guilliermondi complex  Presented to the ED on 6/18/2024 with SOB and hypercapnic respiratory failure. Found on bronchoscopy (6/19) to have RML obstruction by narrowing bronchus intermedius as well as copious mucopurulent secretions/mucus plugging. Previously treated for Stenotrophonomas/aspergillus pneumonia in 11/2023 with subsequent sputum culture (2/2024) negative for both Stenotrophonomas/Aspergillus. Etiology likely repeat Stenotrophomonas infection vs opportunistic infection from colonized microbe.   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    - Continue posaconazole 300 mg/day    - Complete 14 days of micafungin 150 mg/day today (6/25-7/9)- discontinued   - Continue VGCV prophylaxis  - Weekly CMV VL (Tuesdays, next 7/9), last was 39 (7/2)    - Azithromycin per pulmonary (holding due to QTC, but will follow up)  - TMP/SMX SS daily for PJP PPx (for life given h/o PJP)  - Awaiting susceptibilities on Aspergillus per transplant ID  - Present antibiotics   - Continue posaconazole 300 mg/day per pulm transplant (6/25 to present) for candida guillermondii and asergillus ochraceus on prior BAL  - Past antibiotics   - s/p micafungin 150 mg/day (6/25-7/9)  - s/p IV minocycline 100mg BD x 14 days total (6/20-7/5)- for stenotrophamonas  - PO linezolid 600 mg BID x 10 days (6/25-7/5)- for VRE in urine and BAL cultures  - S/p ceftazidime (6/25-6/28)    - S/p vancomycin (6/18-6/20)   - S/p  zosyn (6/18-21)  - S/p Daptomycin (6/21-6/23)  - S/p Meropenem (6/21-6/24)  - S/p Levofloxacin (6/21-6/23)    - BAL fluid (7/3): pink, hazy, cell counts normal range, fluid sent for viral, bacterial and fungal cultures negative to date, cytology negative for malignancy and organisms; preliminary AFB negative    #Pyuria, Enterococcus faecium VRE and  ESBL E. Coli   Asymptomatic. With progressive IV pressor needs, obtained broad infectious workup which demonstrated UCx (6/19/2024) with Enterococcus faecium VR and ESBL E. Coli. S/p Daptomycin (6/21-6/23) and Meropenem (6/21-6/24).    #Hx Aspergillus PNA (11/2023)  #Hx PJP PNA (1/2021)  #Hx CMV viremia, recurrent  #Hx EBV viremia  - Transplant ID consult, appreciate recs  PTA posaconazole (Treatment for hx of aspergillus PNA)  PTA azithromycin 250mg qd (CLAD ppx) (holding)  PTA bactrim (PJP ppx)  -CMV PVR 7/9- negative     Hematology:    #Acute on Chronic Normocytic Anemia, stable  #Thrombocytopenia, chronic, improving  History of chronic anemia in the setting of kidney disease (baseline Hgb 10-11). Upon admission, Hgb 9. May be bone marrow suppression in the setting of chronic illness; potential contribution by blood loss with CRRT filter changes (~150-200c). Peripheral smear (6/18/2024) without evidence of schistocytes. Hemoglobin low today at 7.1, no signs of active bleeding.   - continue to monitor with CBC but improving & stable    Musculoskeletal:  - PT / OT consult - encourage ambulation as tolerated    Skin:  - Sacral Blanching- off load with frequent turns in bed    General Cares/Prophylaxis:    DVT Prophylaxis: subcutaneous heparin  GI Prophylaxis: PPI  Restraints: None  Family Communication:  updated bedside  Code Status: Full Code     Lines/tubes/drains:  - PIV x2  - Tracheostomy  - PEG/J    Disposition:  - Medical ICU  - Per pulm transplant okay for LTACH transfer Thursday after steroid taper plan finalized.     Patient seen and findings/plan  discussed with medical ICU staff, Dr. Sanches.    Edin Davis MD  Internal Medicine Resident, PGY-1      Clinically Significant Risk Factors        # Hypokalemia: Lowest K = 3.1 mmol/L in last 2 days, will replace as needed       # Hypoalbuminemia: Lowest albumin = 2.2 g/dL at 6/21/2024  4:26 PM, will monitor as appropriate                   #Precipitous drop in Hgb/Hct: Lowest Hgb this hospitalization: 6.8 g/dL. Will continue to monitor and treat/transfuse as appropriate.      # Moderate Malnutrition: based on nutrition assessment    # Financial/Environmental Concerns: none                ====================================  INTERVAL HISTORY:   No acute events overnight. RIJ temporary HD line removed. Patient able to tolerate PS 10/5 30% FiO2 for 2.5 hours in am and 2 hours in pm yesterday. Prednisone burst continuing until Wednesday (7/17) with CT chest ordered for same day per pulm transplant. Patient reports she slept well overnight. Denies pain or worsening L forearm swelling.  updated at bedside.    OBJECTIVE:   1. VITAL SIGNS:   Temp:  [98.1  F (36.7  C)-98.5  F (36.9  C)] 98.3  F (36.8  C)  Pulse:  [] 74  Resp:  [19-44] 21  BP: ()/(56-86) 98/69  FiO2 (%):  [30 %] 30 %  SpO2:  [94 %-100 %] 94 %  Vent Mode: CMV/AC  (Continuous Mandatory Ventilation/ Assist Control)  FiO2 (%): 30 %  Resp Rate (Set): 18 breaths/min  Tidal Volume (Set, mL): 320 mL  PEEP (cm H2O): 5 cmH2O  Pressure Support (cm H2O): 10 cmH2O  Resp: 21    2. INTAKE/ OUTPUT:   I/O last 3 completed shifts:  In: 1085 [NG/GT:545]  Out: -     3. PHYSICAL EXAMINATION:  General: Sitting up in bed, NAD   HEENT: Atraumatic, moist mucous membranes, trach in place c/d/I.  Pulm/Resp: Ongoing coarse crackles diffusely throughout, unchanged from prior exam. Non-labored breathing on vent support.   CV: RRR, no m/r/g. No peripheral edema  Abdomen: Soft, non-distended, non-tender, bowel sounds present. PEG/J in place c/d/I.  Ext: Trace  bilateral LE edema, pulses 2+ radial, pedal; stable 2+ pitting edema L forearm distal to AVF, no signs of bleeding  Incisions/Skin: No rashes or lesions  Neuro: Awake and alert x 3, follows commands and nods and shakes head to questions, moving all extremities equally    4. LABS:   Arterial Blood Gases   No lab results found in last 7 days.    Complete Blood Count   Recent Labs   Lab 07/15/24  0641 07/14/24  0656 07/13/24  0706 07/12/24  0504   WBC 2.5* 2.4* 2.9* 2.5*   HGB 7.8* 7.6* 7.3* 7.2*    153 137* 97*     Basic Metabolic Panel  Recent Labs   Lab 07/15/24  0641 07/14/24  2314 07/14/24  1859 07/14/24  1242 07/14/24  0816 07/14/24  0656 07/13/24  1012 07/13/24  0706 07/12/24  0909 07/12/24  0504   *  --   --   --   --  133*  --  132*  --  134*   POTASSIUM 3.1*  --   --   --   --  3.6  --  3.6  --  3.3*   CHLORIDE 95*  --   --   --   --  97*  --  95*  --  97*   CO2 25  --   --   --   --  27  --  25  --  27   BUN 59.7*  --   --   --   --  33.9*  --  65.2*  --  38.5*   CR 2.23*  --   --   --   --  1.50*  --  2.54*  --  1.84*   * 169* 198* 126*   < > 187*   < > 159*   < > 159*    < > = values in this interval not displayed.     Liver Function Tests  Recent Labs   Lab 07/11/24  0518   AST 30   ALT 13   ALKPHOS 282*   BILITOTAL 0.4   ALBUMIN 2.6*       Coagulation Profile  No lab results found in last 7 days.      5. RADIOLOGY:   No results found for this or any previous visit (from the past 24 hour(s)).

## 2024-07-16 NOTE — PROVIDER NOTIFICATION
07/16/24 1128   Weaning Assessment   Spontaneous Respiratory Rate 32 breaths/min   Spontaneous Tidal Volume (mL) 313 mL   Spontaneous Minute Ventilation 8.24 mL   RSBI 102.24   Weaning trial discontinued due to: Increased SOB   Wean Start Time  0828   Wean End Time  1128   Wean Time Calculated (min) 180     Pt completed a 3 hour PS trial 10/+5 this morning as listed above. A second PS trial was not initiated this evening due to a dialysis run until 1800. Please see flowsheets for further information.

## 2024-07-16 NOTE — PROGRESS NOTES
CLINICAL NUTRITION SERVICES - REASSESSMENT NOTE     Nutrition Prescription    RECOMMENDATIONS FOR MDs/PROVIDERS TO ORDER:  None currently     Malnutrition Status:    Moderate malnutrition in the context of acute illness/disease    Recommendations already ordered by Registered Dietitian (RD):  None currently    Future/Additional Recommendations:  Monitor stool output     EVALUATION OF THE PROGRESS TOWARD GOALS   Diet: NPO + TF    Nutrition Support: access: PEGJ placed     Formula/schedule/modulars: 7/3-__: Novasource Renal @ 30 ml/hr (720 ml) + 1 Prosource Tf20 provides 1520 kcal (29 kcal/kg), 85 g pro (1.6 g/kg), 132 g CHO, 516 ml free water, and 0 g fiber daily. --> decrease prosource as off CRRT     -7/3: Goal TF: Novasource Renal (or equivalent) @ 30 ml/hr (720 ml) + 2 Prosource Tf20 provides 1600 kcal (30 kcal/kg), 105 g pro (2.0g/kg), 132 g CHO, 516 ml free water, and 0 g fiber daily     -  : Goal TF: Novasource Renal (or equivalent) @ 30 ml/hr (720 ml) + 1 Prosource Tf20 provides 1520 kcal (29 kcal/kg), 85 g pro (1.6 g/kg), 132 g CHO, 516 ml free water, and 0 g fiber daily.       Free water flushes: 30 mL every 4 hours    Intake/Tolerance: Tube Feedin day average tube feeding infusion of 655 mL (91 % of goal volume) + 7 day average intake of 1 Prosource TF20 packet(s) (100 % of prescribed packets)  = average intake of 1390 kcal/day and 82 g protein/day.     NEW FINDINGS     GI:  Last BM: 07/15/24  Banatrol TF 1 pkt Q8H   stool occurrences: 2x 7/15, 4x 7/14, 2x 7/13, 5x 7/12, 5x 7/11    Weight:  Most Recent Weight: 54.2 kg (119 lb 7.8 oz)  on 24 via Bed scale  EDW 52.5 kg per neph  Body mass index is 21.17 kg/m .  + 12 L from admission per I/O.  Wt stable from admission.     Meds:  All meds via J port except for tacrolimus into G port per transplant  SSI Q4H; NPH insulin BID  Renal multivitamin; vitamin D3 50 mcg MWF  Protonix  Prednisone   Bactrim  Tacrolimus  Valcyte     Labs:    "07/13/24 07:06 07/14/24 06:56 07/15/24 06:41 07/16/24 05:47   Sodium 132 (L) 133 (L) 133 (L) 131 (L)   Potassium 3.6 3.6 3.1 (L) 3.1 (L)   Chloride 95 (L) 97 (L) 95 (L) 92 (L)   Carbon Dioxide (CO2) 25 27 25 24   Urea Nitrogen 65.2 (H) 33.9 (H) 59.7 (H) 86.1 (H)   Creatinine 2.54 (H) 1.50 (H) 2.23 (H) 2.84 (H)   GFR Estimate 21 (L) 39 (L) 24 (L) 18 (L)   Calcium 8.4 (L) 8.4 (L) 8.3 (L) 7.9 (L)   Anion Gap 12 9 13 15   Magnesium 1.9 1.7 1.9 1.9   Phosphorus 4.8 (H) 3.3 4.5 5.0 (H)   Glucose 159 (H) 187 (H) 157 (H) 166 (H)       Respiratory:   Trach     Renal:  HD    MALNUTRITION  % Intake: Decreased intake does not meet criteria  % Weight Loss: None noted -- cannot rule out confounded by fluid status  Subcutaneous Fat Loss: Facial region:  mild and Thoracic/intercostal:  mild   Muscle Loss: Temporal:  mild, Thoracic region (clavicle, acromium bone, deltoid, trapezius, pectoral):  mild, Upper arm (bicep, tricep):  mild, and Lower arm  (forearm):  mild  Fluid Accumulation/Edema: Moderate  Malnutrition Diagnosis: Moderate malnutrition in the context of acute illness/disease    Previous Goals   Total avg nutritional intake to meet a minimum of 25 kcal/kg and 1.5 g PRO/kg daily (per dosing wt 52.8 kg).  Evaluation: Met    Previous Nutrition Diagnosis  Inadequate oral intake related to trach, oxygen needs as evidenced by NPO with nutrition support needed to meet nutrition needs.     Evaluation: No change    CURRENT NUTRITION DIAGNOSIS  Inadequate oral intake related to trach, oxygen needs as evidenced by NPO with nutrition support needed to meet nutrition needs.       INTERVENTIONS  Implementation  Enteral Nutrition - continue as ordered      Goals  Total avg nutritional intake to meet a minimum of 25 kcal/kg and 1.5 g PRO/kg daily (per dosing wt 52.8 kg).    Monitoring/Evaluation  Progress toward goals will be monitored and evaluated per protocol.      Olesya Velasco, MS, RDN, LD  4C MICU RD  Vocera - \"4C Clinical " "Dietitian\"  Weekend/Holiday RD - \"Weekend Clinical Dietitian\"    "

## 2024-07-16 NOTE — PROGRESS NOTES
MEDICAL ICU PROGRESS NOTE  07/16/2024      Date of Service (when I saw the patient): 07/16/2024    ASSESSMENT: Kecia Blue is a 63 year old female with PMH ILD s/p bilateral lung transplant (2018) c/b recurrent right bronchial stenosis s/p repeat balloon dilation/stenting, repeat opportunistic pneumonia (PJP 2021, aspergillus/stenotrophomonas 11/2023) and viremias (EBV, CMV), and ESRD 2/2 tacrolimus toxicity on HD who was admitted on 6/18/2024 for acute hypercapnic respiratory failure 2/2 tracheal stenosis and pneumonia.  Status post airway stent placement by IP on 6/20. Hospital course complicated by hypotension, delirium, and prolonged respiratory failure. Tracheostomy placement on 7/3 and PEGJ tube placement with IR on 7/5. Now persistent respiratory failure with minimal progress on PST, treating empirically for immune/inflammatory process. Discharge to LTACH pending plan for steroid taper.     Changes today:  - Plan for hemodialysis today  - Per pulm transplant, continue prednisone 50 mg daily, until CT on 7/17; taper plan TBD imaging  - Per transplant pulm continue posaconazole 300 mg daily indefinitely  - Continue BID SBT trials on pressure support 10/5 as tolerated  - IR to remove PEG/J buttons prior to transfer to LTAC    PLAN:    Neuro:  #Pain   - Acetaminophen 1 gram q8h prn       #Toxic metabolic encephalopathy, resolved  Ongoing lethargy in setting of sepsis and delirium in the setting of high-dose steroid therapy. Patient reports improved sleep on current medications. Following commands this morning.   - Continue quetiapine to 25 mg at bedtime   - Ramalteon at bedtime  - trazodone 25mg for sleep  - delirium precautions  - Do not disturb order overnight to promote sleep    #Anxiety, improving  Patient has been having anxiety regarding pressure support trials and weaning from the ventilator.  and patient would like to talk to somebody while in the hospital. Will plan to consult [facility]  Psychology tomorrow (as not here on weekends).   - Health Psychology consulted, seeing patient once per week while admitted- improved    Pulmonary:  #Acute on chronic hypoxic hypercapnic respiratory failure, improving  #concern for possible immune/inflammatory process  #HAP, Stenotrophomonas maltophilia, Enterococcus faecalis, and Candida guilliermondi complex  #Severe pulmonary edema  #Acute respiratory distress syndrome, resolved  Presented to the ED on 6/18/2024 with acute on chronic hypoxic hypercapnic respiratory failure. Transferred to MICU and intubated on overnight on 6/18. Workup significant for Stenotrophomonas, Enterococcus, and Candida pneumonia. Course was c/b progression to ARDS (6/21-22) with elevated plateau pressures.  CXR (6/26) with similar pulmonary opacities compared to 6/24, left > right.  The etiology of this groundglass opacity was unclear but ddx includes rejection vs. infection vs. volume overload. Tracheostomy placed on 7/3 to assist with ventilator weaning. CT chest w/o contrast 7/8: Waxing and waning mostly improved right lung consolidation now with diffuse groundglass organizing opacities mostly in the lower lobe an middle lobe more than the upper lobe. Slight improvement in the peribronchiolar consolidation in the left lower lobe but there is increased groundglass organization in the left upper lobe.    - s/p tracheostomy 7/3-> ENT removed sutures and perform trach dressing change 7/8  - Pulmonary toilet  - Mucomyst BID  - Hypertonic saline BID, alternate with mucomyst  - Albuterol neb QID  - Antibiotics, as below  - Volume management with iHD  - Continue Daily SBT (AM and PM) on pressure support 10/5  - pulm transplant following    Vent Mode: CMV/AC  (Continuous Mandatory Ventilation/ Assist Control)  FiO2 (%): 30 %  Resp Rate (Set): 18 breaths/min  Tidal Volume (Set, mL): 320 mL  PEEP (cm H2O): 5 cmH2O  Pressure Support (cm H2O): 10 cmH2O  Resp: 18      #Recurrent right middle  bronchus stenosis s/p dilation and stent (6/20/2024), stable  Has history (bronchoscopy 2/15/24) demonstrating narrowing of right mainstem transplant anastomosis and bronchus intermedius. CT chest (6/18/2024) and bronchoscopy (6/19/2024) demonstrated occlusion of right middle bronchus. With concern for post-obstructive pneumonia, interventional pulmonology was consulted, and completed bronchoscopy (6/20/2024) with tissue debulking, right middle bronchus balloon dilation to 11 mm, stent placement in right main bronchus, and bifurcating cast placement in right upper bronchus. IP pulm re-evaluated bronchial stents with BAL and noted that they were open.    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   - Prospera (7/3) pending  - DSA (7/3) pending  - PTA nebs  - Fluticasone-viilanterol (breo ellipta) every day - HOLD with respiratory infection  - Montelukast every day   - Immunosuppressants per Tx Pulm:   - PTA Tacrolimus (dosing per tx pulm)  - PTA Prednisone 5 mg daily   - PTA azathioprine - HOLD per Transplant pulmonology with repeat infections  - Peripheral smear (7/9) pending for pancytopenia  - Antibiotic prophylaxis   - Bactrim MWF for PJP ppx (monitor need to adjust pending HD plan)  - CMV weekly monitoring (qTuesday)  - Azithromycin 125mg per pulmonary, continue to monitor QTc  - Continue VGCV ppx (renally dosed, monitor need to adjust pending HD plan) with tentative plan for 3 weeks beyond completion of stress dose steroids   - Per pulm transplant: Continue steroid burst with prednisone 1 mg/kg, currently 50 mg daily, until CT on 7/17; taper plan TBD imaging, but can start planning for LTACH discharge Thurs  - Hold PTA prednisone  dose given burst dose      Cardiovascular:  #Septic shock, resolved  On 6/19/2024, developed sepsis (hypotension 80s/50s, tachypnea 24) in the setting of stenotrophomonas pneumonia/enterococcus faecium VRE UTI. Etiology of shock likely distributive iso sepsis; less likely hypovolemic (not pulling volumes with CRRT), medication-associated (weaned off of propofol gtt) or obstructive (low suspicion for PE, echo 6/19 without evidence of cardiac dysfunction). Occasional episodes of hypotension overnight, but self-resolving and have not occurred in a couple days.  - s/p SDS & pressors and midodrine, off of all support agents  - MAPs 70s-80s, stable    #Demand Ischemia, resolved    Presented with elevated troponin (peak 499). Not associated with chest pain or hypoxia. EKG without ST elevations/depressions or T wave inversions. Suspect demand ischemia in the setting of sepsis. Will continue to monitor symptoms and continuous telemetry. EKG 7/2 sinus rhythm with PACs.     #Paroxysmal A-Fib, improved  #Pulmonary Hypertension   #Transient arhythmia with palpitations  History of pulmonary hypertension (45 mmHg, echo 10/2023) in the setting of ILD and paroxysmal afib on PTA metoprolol tartrate BID. Not on anticoagulation for afib. Transient unspecified arhythmia overnight (7/9) with palpitations. EKG 7/9 sinus rhythm with fusion complexes and known RBBB. Repeat EKG 7/10 showing sinus rhythm with no RBBB or fusion complexes. Patient given magnesium 1g and 20mEq K+ for K 3.3 this am. No further episodes of palpitations. Currently in sinus rhythm.  - continue PTA metoprolol tartrate 25mg BID  - CTM    #Prolonged Qtc  Repeat EKG 7/13 with QTC of 483.   - Azithromycin ppx restarted per pulm transplant  - CTM    GI/Nutrition:  #Nutrition  PEG-J placed by IR on 7/5, continue tube feeds.   - Nutrition consulted  - Tube feeds 30ml/hr; at goal    #Diarrhea, resolved  On 6/21, had 8 bowel movements. Occurred in the setting of scheduled  bowel regimen and starting new antibiotics (meropenem, levofloxacin). C diff negative.   - Holding PRN bowel regimen  - Miralax PRN   - Senna prn  - formula changes made per nutrition      #Cholelithiasis with gallbladder wall thickening, resolved  During admission patient found to have up trending LFTs and fever in setting of GB wall thickening on CT abdomen/pelvis, now resolved. MRCP completed showing borderline dilated CBD up to 1.1 cm, no significant intrahepatic biliary dilation, no evidence of choledocholithiasis, no evidence of acute cholecystitis.    Renal/Fluids/Electrolytes:  #ESRD on iHD (M,W,F)  History of ESRD 2/2 Tacrolimus toxicity on HD (MWF). With soft blood pressures, placed RIJ (6/20/2024) and started CRRT (6/20/2024). US of AVF 7/5: arterial inflow patent without inflow stenosis, normal diameter of anastamosis, venous outflow elevated velocity at subclavian- suggestive of recurrent stenosis in venous outflow (area of prior angioplasty in March). IR consulted who noted that IR fistulogram not required at that time, and was able to run iHD on 7/6 off her fistula. Currently having some swelling in same arm that fistula is placed in  - Nephrology consulted and following for ESRD  - will follow recommendations for need of HD  - Renally-dosed medications  - BMP daily  - HD today      Endocrine:  #Risk for hyperglycemia with high dose steroids  Stress dose steroids discontinued, resume PTA prednisone 5 mg daily. Blood sugars have remained in appropriate range.  - NPH 8U BID  -CTM     ID:  #HAP, Stenotrophomonas maltophilia, Enterococcus faecalis, Aspergillus, and Candida guilliermondi complex  Presented to the ED on 6/18/2024 with SOB and hypercapnic respiratory failure. Found on bronchoscopy (6/19) to have RML obstruction by narrowing bronchus intermedius as well as copious mucopurulent secretions/mucus plugging. Previously treated for Stenotrophonomas/aspergillus pneumonia in 11/2023 with subsequent  sputum culture (2/2024) negative for both Stenotrophonomas/Aspergillus. Etiology likely repeat Stenotrophomonas infection vs opportunistic infection from colonized microbe.   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    - Continue posaconazole 300 mg/day    - Complete 14 days of micafungin 150 mg/day today (6/25-7/9)- discontinued   - Continue VGCV prophylaxis  - Weekly CMV VL (Tuesdays, next 7/9), last was 39 (7/2)    - Azithromycin per pulmonary (holding due to QTC, but will follow up)  - TMP/SMX SS daily for PJP PPx (for life given h/o PJP)  - Awaiting susceptibilities on Aspergillus per transplant ID  - Present antibiotics   - Continue posaconazole 300 mg/day per pulm transplant (6/25 to present) for candida guillermondii and asergillus ochraceus on prior BAL  - Past antibiotics   - s/p micafungin 150 mg/day (6/25-7/9)  - s/p IV minocycline 100mg BD x 14 days total (6/20-7/5)- for stenotrophamonas  - PO linezolid 600 mg BID x 10 days (6/25-7/5)- for VRE in urine and BAL cultures  - S/p ceftazidime (6/25-6/28)    - S/p vancomycin (6/18-6/20)   - S/p zosyn (6/18-21)  - S/p Daptomycin (6/21-6/23)  - S/p Meropenem (6/21-6/24)  - S/p Levofloxacin (6/21-6/23)    - BAL fluid (7/3): pink, hazy, cell counts normal range, fluid sent for viral, bacterial and fungal cultures negative to date, cytology negative for malignancy and organisms; preliminary AFB negative    #Pyuria, Enterococcus faecium VRE  and  ESBL E. Coli   Asymptomatic. With progressive IV pressor needs, obtained broad infectious workup which demonstrated UCx (6/19/2024) with Enterococcus faecium VR and ESBL E. Coli. S/p Daptomycin (6/21-6/23) and Meropenem (6/21-6/24).    #Hx Aspergillus PNA (11/2023)  #Hx PJP PNA (1/2021)  #Hx CMV viremia, recurrent  #Hx EBV viremia  - Transplant ID consult, appreciate recs  PTA posaconazole (Treatment for hx of aspergillus PNA)  PTA azithromycin 250mg qd (CLAD ppx) (holding)  PTA bactrim (PJP ppx)  -CMV PVR 7/9- negative     Hematology:    #Acute on Chronic Normocytic Anemia, stable  #Thrombocytopenia, chronic, improving  History of chronic anemia in the setting of kidney disease (baseline Hgb 10-11). Upon admission, Hgb 9. May be bone marrow suppression in the setting of chronic illness; potential contribution by blood loss with CRRT filter changes (~150-200c). Peripheral smear (6/18/2024) without evidence of schistocytes. Hemoglobin low today at 7.1, no signs of active bleeding.   - continue to monitor with CBC but improving & stable    Musculoskeletal:  - PT / OT consult - encourage ambulation as tolerated    Skin:  - Sacral Blanching- off load with frequent turns in bed    General Cares/Prophylaxis:    DVT Prophylaxis: subcutaneous heparin  GI Prophylaxis: PPI  Restraints: None  Family Communication:  updated bedside  Code Status: Full Code     Lines/tubes/drains:  - PIV x2  - Tracheostomy  - PEG/J    Disposition:  - Medical ICU  - Per pulm transplant okay for LTACH transfer Thursday after steroid taper plan finalized.     Patient seen and findings/plan discussed with medical ICU staff, Dr. Sanches.    Edin Davis MD  Internal Medicine Resident, PGY-1      Clinically Significant Risk Factors        # Hypokalemia: Lowest K = 3.1 mmol/L in last 2 days, will replace as needed       # Hypoalbuminemia: Lowest albumin = 2.2 g/dL at 6/21/2024  4:26 PM, will monitor as appropriate                    #Precipitous drop in Hgb/Hct: Lowest Hgb this hospitalization: 6.8 g/dL. Will continue to monitor and treat/transfuse as appropriate.      # Moderate Malnutrition: based on nutrition assessment    # Financial/Environmental Concerns: none                ====================================  INTERVAL HISTORY:   No acute events overnight. Continues to tolerate SBT trials well BID. Denies pain or SOB. L forearm edema improved.     OBJECTIVE:   1. VITAL SIGNS:   Temp:  [97.5  F (36.4  C)-98.2  F (36.8  C)] 97.6  F (36.4  C)  Pulse:  [] 78  Resp:  [18-42] 18  BP: ()/(58-78) 113/58  FiO2 (%):  [30 %] 30 %  SpO2:  [93 %-100 %] 100 %  Vent Mode: CMV/AC  (Continuous Mandatory Ventilation/ Assist Control)  FiO2 (%): 30 %  Resp Rate (Set): 18 breaths/min  Tidal Volume (Set, mL): 320 mL  PEEP (cm H2O): 5 cmH2O  Pressure Support (cm H2O): 10 cmH2O  Resp: 18    2. INTAKE/ OUTPUT:   I/O last 3 completed shifts:  In: 1165 [NG/GT:625]  Out: -     3. PHYSICAL EXAMINATION:  General: Sitting up in bed, NAD   HEENT: Atraumatic, moist mucous membranes, trach in place c/d/I.  Pulm/Resp: Ongoing coarse crackles diffusely improved compared to prior exam. Non-labored breathing on vent support.   CV: RRR, no m/r/g. No peripheral edema  Abdomen: Soft, non-distended, non-tender, bowel sounds present. PEG/J in place c/d/I.  Ext: Trace bilateral LE edema, pulses 2+ radial, pedal; stable 1+ pitting edema L forearm distal to AVF, no signs of bleeding  Incisions/Skin: No rashes or lesions  Neuro: Awake and alert x 3, follows commands and nods and shakes head to questions, moving all extremities equally    4. LABS:   Arterial Blood Gases   No lab results found in last 7 days.    Complete Blood Count   Recent Labs   Lab 07/16/24  0547 07/15/24  0641 07/14/24  0656 07/13/24  0706   WBC 2.7* 2.5* 2.4* 2.9*   HGB 7.9* 7.8* 7.6* 7.3*    150 153 137*     Basic Metabolic Panel  Recent Labs   Lab 07/16/24  0547 07/16/24  0429  07/16/24  0016 07/15/24  1947 07/15/24  0856 07/15/24  0641 07/14/24  0816 07/14/24  0656 07/13/24  1012 07/13/24  0706   *  --   --   --   --  133*  --  133*  --  132*   POTASSIUM 3.1*  --   --   --   --  3.1*  --  3.6  --  3.6   CHLORIDE 92*  --   --   --   --  95*  --  97*  --  95*   CO2 24  --   --   --   --  25  --  27  --  25   BUN 86.1*  --   --   --   --  59.7*  --  33.9*  --  65.2*   CR 2.84*  --   --   --   --  2.23*  --  1.50*  --  2.54*   * 189* 172* 206*   < > 157*   < > 187*   < > 159*    < > = values in this interval not displayed.     Liver Function Tests  Recent Labs   Lab 07/11/24  0518   AST 30   ALT 13   ALKPHOS 282*   BILITOTAL 0.4   ALBUMIN 2.6*       Coagulation Profile  No lab results found in last 7 days.      5. RADIOLOGY:   No results found for this or any previous visit (from the past 24 hour(s)).

## 2024-07-16 NOTE — PLAN OF CARE
Goal Outcome Evaluation:ICU End of Shift Summary. See flowsheets for vital signs and detailed assessment.    Changes this shift: A/O X 4; AVSS. Up in chair. Worked with OT/PT. No c/o.    Plan:  Continue with current plan of care.

## 2024-07-16 NOTE — TELEPHONE ENCOUNTER
Called patient to inform her of her OR bronch scheduled time and date.  No answer. Called pt's spouse, Ranjan, and informed him.  Ranjan in understanding and will put it on their calendar and inform Kecia.  No questions at this time.  Will also send a [employer] message with the information as well per pt request.

## 2024-07-16 NOTE — PROGRESS NOTES
ICU Daily Rounding Checklist     Checklist Response Notes   Can sedation be reduced?  NA    Can analgesia be reduced? No    Is delirium being assessed, addressed and prevented? Yes    Spontaneous awakening trial and/or Spontaneous breathing trial candidate?  Yes    Total fluid balance goal reviewed?  Yes Target Goals:        per HD [12h]             per HD [24h]   Is the patient at goals for lung protective ventilation? Yes    Head of bed elevation (30 degrees)? Yes    Skin breakdown assessment (prevention) completed? Yes    Is enteral nutrition at goal? Yes    Is blood glucose at goal? Yes    Deep venous thrombosis prophylaxis? Yes      Gastric ulcer prophylaxis?  Yes If coagulopathy (INR-1.5 PTT2x normal. Ph<50k), mechanical ventilation 48hr, history of GI bleed/ulcer within past year. TBL, SCI, or burn, or if >= 2 minor risk factors (sepsis, ICU stay 1 week, occult GI bleed > 6 days. glucocorticoid therapy, NSAID use, antiplatelet use)   Can Antibiotics be narrowed or discontinued? No    Early mobility candidate and physical therapy consulted? No    Is rivas catheter needed? No    Is central venous/arterial catheter needed? No    Has the family been updated? Yes    Are the patient's goals of care and code status current? Yes      Kaitlynn Mcadams MD  Internal Medicine PGY3

## 2024-07-16 NOTE — PLAN OF CARE
Goal Outcome Evaluation:  ICU End of Shift Summary. See flowsheets for vital signs and detailed assessment.    Changes this shift: Remains A&O, GALLOWAY, PS for 3 hours this evening. Turned back to vent settings at 2000. A fib rate controlled 's this shift. Makes needs known. 1 small stool smear this shift. Mepiplex's changed.     Plan: CT chest tomorrow, plan for LTACH Thursday. Ranjan - - gone until Wednesday.

## 2024-07-16 NOTE — PROGRESS NOTES
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  The patient was admitted on 24 for progressive hypoxia with dyspnea and worsening hypercapnia in ED requiring intubation likely d/t post-obstructive PNA 2/2 right bronchus stenosis.  S/p IP bronch () with laser tissue debulking RMB stenosis, airway balloon dilation of RMB and BI, and placement of stent RMB to BI and bifurcating stent in RUL.  S/p CRRT (-), iHD resumed .  Recurrence of paroxysmal afib with RVR first noted . S/p trach with ENT on 7/3 and GJ tube with IR on .  Treating empirically for possible immune/inflammatory process vs organizing pneumonia (given GG changes on CT) with steroid burst/taper.  Gradual improvement in PST on ventilator.  Discharge to LTACH  (pending bed availability) pending plan for steroid taper following repeat CT.      Today's recommendations:  - Continue steroid burst with taper plan TBD based on next CT scan (ordered )  - Ongoing PST BID per MICU  - Resume PTA azithromycin at decreased dose (had been held for prolonged QTc)  - Tacrolimus to be via G tube exclusively (has been likely receiving via J tube), defer dose adjustment, repeat level  when steady state (ordered)  - Posaconazole per transplant ID   - CMV weekly monitoring  (qTuesday) and VGCV ppx with increased steroid courses, duration TBD     Septic shock:  Acute on chronic hypoxic/hypercapneic respiratory failure:  Post-obstructive multi-lobar PNA:  Right bronchial stenosis with RML collapse: Admitted with 2 weeks of progressive hypoxia, dyspnea, congested cough, and fatigue.  Chronic hypoxia with 2L NC, increased from 3 to 4L over this time with acute decompensation on day of admission associated with hypercapnia. Intubated in the ED. Negative comprehensive ID workup on admission. Procal mildly elevated at 0.24 initially (then elevated to 1.77 6/20), LA normal, but febrile to 100.7.  TTE on admission grossly normal.  CT with RUL/RLL consolidative opacities, RML collapse, and narrowing of BI and distal RLL bronchus.  Bronch (6/19) with severe stenosis starting at right anastomosis, inspection with smaller scope revealing for extensive RLL mucous plugging.  S/p IP bronch (6/20) with laser tissue debulking RMB stenosis, airway balloon dilation of RMB and BI, and placement of stent RMB to BI and bifurcating stent in RUL.  Respiratory cultures with Steno, VSE, VRE, Aspergillus ochraceus, and Elidia guilliermondii.  Chest CT (6/29) with multifocal panlobar opacities and small bibasilar pleural effusions.  S/p trach with ENT on 7/3 and GJ tube with IR on 7/5.  S/p stress dose steroids 6/21-7/5.  Chest CT (7/8) with waxing and waning mostly improved right lung consolidation and new diffuse GGO >BLL, increased GGO in KONRAD. Started on steroid burst and overall improving.  Gradual improvement in PST.   - ABX: none currently; s/p linezolid (6/25-7/5), minocycline (6/20-7/5, Steno), ceftazidime (6/25-6/28), meropenem (6/21-6/24), daptomycin (6/21-6/24), levofloxacin (6/21-6/23), Zosyn (6/18-6/21), azithromycin 500 mg daily (6/18-6/20), and vancomycin (6/18)   - Steroid burst (7/11) with prednisone 1 mg/kg daily (50 mg) with taper plan TBD based on next CT scan (ordered 7/17)  - Nebs:  levalbuterol QID, Mucomyst 10% BID alternating with 3% HTS (6/22), will need to be on NS nebs BID upon discharge per IP  - Vent management and weaning per MICU  - Chest CT non-contrast on 7/17 (ordered)     S/p bilateral sequential lung transplant (BSLT) for ILD:   Progressive CLAD: Most recent OP visit in February.  DSA negative 7/3 (last positive noted 2018).  Repeat ImmuKnow (6/19) 87, very low but IST adjustment deferred per Dr. Graham given acuity and steroids (after ImmuKnow level).  Repeat Prospera remains high at 2.26 on 7/3 possibly related to recent infection, now on steroids. ImmuKnow remains low at 89 on 7/8, defer IST adjustment at this time.  - PTA Singulair  - Breo inhaler (on hold with trach)  - Azithromycin (held 7/10 for prolonged QTc) to be resumed at decreased dose of 125 mg daily, continue to monitor QTc     Immunosuppression:  On 2-drug IST since November d/t leukopenia and recurrent infections  - Tacrolimus 1 mg qAM / 1.5 mg qPM (increased 7/14) to be via G tube exclusively (has been likely receiving via J tube).  Goal level 8-10.  Repeat level 7/17 when steady state (ordered).  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  S/p ABX as above.     Aspergillus ochraceus:  H/o Aspergillus empyema: S/p empyema drainage and ampho B instillation.  Previously on voriconazole, transitioned to posaconazole and managed by transplant ID as OP with last visit 3/13.  Calcified fungal elements remain with additional recurrent effusion (likely associated with fluid disturbances r/t iHD), but surgical intervention previously deferred d/t risk.  Posaconazole level 2.5 on 6/19.  Susceptibility testing done on fungal bronch culture 6/19 per transplant ID.  S/p micafungin 150 mg daily (6/25-7/9) per transplant ID.  - Posaconazole 300 mg daily chronically per  transplant ID (signed off 7/9) with plan for transplant ID follow up ~6-8 weeks      H/o CMV viremia: Recurrent CMV viremia historically.  VGCV ppx most recently stopped 4/12.  Recent CMV low positive at 46 (6/12), <35 (6/18), 39 (6/25, 7/2) and now negative 7/9.  - CMV weekly monitoring (qTuesday) and VGCV ppx (6/24, renally dosed) with increased steroid courses, duration TBD     EBV viremia: S/p rituximab 12/13/23 x1 dose.  Most recent EBV negative 6/18.     Dilated CBD:  Suspected acute cholecystitis: MRCP (6/29) with CBD dilation with stones and cholelithiasis without cholecystitis.  GI signed off 6/30, recommending general surgery consult if RUQ pain recurs.     ESRD on iHD: Kidney evaluation started as OP.  On qMWF iHD schedule, no missed sessions.  Transitioned to CRRT on 6/20, stopped 7/2 and iHD resumed 7/4.  Management per renal and MICU.     Pancytopenia: WBC 2.2-3.2 since 7/7.  Platelets down to 45 on 7/9.  On VGCV as above.  Also intermittently requiring pRBC.  Peripheral smear (7/9) with moderate normochromic, normocytic anemia with no increase in erythrocyte regeneration, slight leukopenia, marked thrombocytopenia with normal platelet morphology, and no overt evidence of dysplasia. CBCstable.  - CBC with differential daily, consider G-CSF if ANC </=0.8     VRE urine culture: Noted 6/19, >100k GNB and VRE faecium, ABX as above with management per transplant ID and MICU.     We appreciate the excellent care provided by the MICU team.  Recommendations communicated via in person rounding and this note.  Will continue to follow along closely, please do not hesitate to call with any questions or concerns.    Coretta James MD  Pulmonary, Allergy, Critical Care, and Sleep Medicine   [facility]   Pager: [phone]       Subjective & Interval History:     Breathing feels improved but as though she has volume overload. Planning for HD today. Slept well. No significant coughing. Tolerated PS 10/5 > 2  "hours x 2.     Review of Systems:     C: No fever, no chills, + change in weight  INTEGUMENTARY/SKIN: No rash or obvious new lesions  ENT/MOUTH: No nasal congestion or drainage  RESP: See interval history  CV: No chest pain, no palpitations, no peripheral edema, no orthopnea  GI: No nausea, no vomiting, + 1 loose stool this am, no reflux symptoms  : No dysuria  MUSCULOSKELETAL: No myalgias, no arthralgias  ENDOCRINE: Blood sugars with adequate control  NEURO: No headache, no numbness or tingling  PSYCHIATRIC: Mood stable    Physical Exam:     All notes, images, and labs from past 24 hours (at minimum) were reviewed.    Vital signs:  Temp: 97.6  F (36.4  C) Temp src: Oral BP: 115/81 Pulse: 102   Resp: 28 SpO2: 100 % O2 Device: Mechanical Ventilator Oxygen Delivery: 6 LPM Height: 160 cm (5' 3\") Weight: 54.2 kg (119 lb 7.8 oz)  I/O:     Intake/Output Summary (Last 24 hours) at 7/16/2024 1553  Last data filed at 7/16/2024 0900  Gross per 24 hour   Intake 1030 ml   Output --   Net 1030 ml     Constitutional: Sitting up in a chair, in no apparent distress.   HEENT: Eyes with pink conjunctivae, anicteric.  Oral mucosa moist without lesions.  Trach cdi.  PULM: Diminished bases bilaterally.  Scattered crackles, no rhonchi, no wheezes.  Non-labored breathing on PS 10/5/30.  CV: Normal S1 and S2.  RRR.  No murmur, gallop, or rub.  No peripheral edema.   ABD: NABS, soft, nontender, nondistended.  PEG/J cdi, G clamped, TF to J tube.  MSK: Moves all extremities.   NEURO: Alert and conversant by mouthing words.   SKIN: Warm, dry. fragile.  No rash on limited exam.   PSYCH: Mood stable.     Data:     LABS    CMP:   Recent Labs   Lab 07/16/24  1315 07/16/24  0831 07/16/24  0547 07/16/24  0429 07/15/24  0856 07/15/24  0641 07/14/24  0816 07/14/24  0656 07/13/24  1012 07/13/24  0706 07/11/24  0831 07/11/24  0518   NA  --   --  131*  --   --  133*  --  133*  --  132*   < > 131*   POTASSIUM  --   --  3.1*  --   --  3.1*  --  3.6  --  " "3.6   < > 3.8   CHLORIDE  --   --  92*  --   --  95*  --  97*  --  95*   < > 98   CO2  --   --  24  --   --  25  --  27  --  25   < > 25   ANIONGAP  --   --  15  --   --  13  --  9  --  12   < > 8   * 158* 166* 189*   < > 157*   < > 187*   < > 159*   < > 122*   BUN  --   --  86.1*  --   --  59.7*  --  33.9*  --  65.2*   < > 57.5*   CR  --   --  2.84*  --   --  2.23*  --  1.50*  --  2.54*   < > 2.52*   GFRESTIMATED  --   --  18*  --   --  24*  --  39*  --  21*   < > 21*   CHELSEY  --   --  7.9*  --   --  8.3*  --  8.4*  --  8.4*   < > 8.6*   MAG  --   --  1.9  --   --  1.9  --  1.7  --  1.9   < >  --    PHOS  --   --  5.0*  --   --  4.5  --  3.3  --  4.8*   < >  --    PROTTOTAL  --   --   --   --   --   --   --   --   --   --   --  5.1*   ALBUMIN  --   --   --   --   --   --   --   --   --   --   --  2.6*   BILITOTAL  --   --   --   --   --   --   --   --   --   --   --  0.4   ALKPHOS  --   --   --   --   --   --   --   --   --   --   --  282*   AST  --   --   --   --   --   --   --   --   --   --   --  30   ALT  --   --   --   --   --   --   --   --   --   --   --  13    < > = values in this interval not displayed.     CBC:   Recent Labs   Lab 07/16/24  0547 07/15/24  0641 07/14/24  0656 07/13/24  0706   WBC 2.7* 2.5* 2.4* 2.9*   RBC 2.36* 2.38* 2.27* 2.23*   HGB 7.9* 7.8* 7.6* 7.3*   HCT 24.6* 25.5* 23.4* 22.9*   * 107* 103* 103*   MCH 33.5* 32.8 33.5* 32.7   MCHC 32.1 30.6* 32.5 31.9   RDW 24.5* 25.0* 24.3* 23.6*    150 153 137*       INR: No lab results found in last 7 days.    Glucose:   Recent Labs   Lab 07/16/24  1315 07/16/24  0831 07/16/24  0547 07/16/24  0429 07/16/24  0016 07/15/24  1947   * 158* 166* 189* 172* 206*       Blood Gas: No lab results found in last 7 days.    Culture Data No results for input(s): \"CULT\" in the last 168 hours.    Virology Data:   Lab Results   Component Value Date    FLUAH1 Not Detected 06/18/2024    FLUAH3 Not Detected 06/18/2024    TO4340 Not Detected " 06/18/2024    IFLUB Not Detected 06/18/2024    RSVA Not Detected 06/18/2024    RSVB Not Detected 06/18/2024    PIV1 Not Detected 06/18/2024    PIV2 Not Detected 06/18/2024    PIV3 Not Detected 06/18/2024    HMPV Not Detected 06/18/2024    HRVS Negative 01/24/2021    ADVBE Negative 01/24/2021    ADVC Negative 01/24/2021    ADVC Negative 12/23/2020    ADVC Negative 10/07/2019       Historical CMV results (last 3 of prior testing):  Lab Results   Component Value Date    CMVQNT Not Detected 07/09/2024    CMVQNT <35 (A) 06/18/2024    CMVQNT Not Detected 03/05/2024     Lab Results   Component Value Date    CMVLOG 1.6 07/02/2024    CMVLOG 1.6 06/25/2024    CMVLOG <1.5 06/18/2024       Urine Studies    Recent Labs   Lab Test 06/19/24  1214 01/24/21  1729   URINEPH 6.5 5.0   NITRITE Negative Negative   LEUKEST Large* Moderate*   WBCU >182* 34*       Most Recent [place]ze Pulmonary Function Testing (FVC/FEV1 only)  FVC-Pre   Date Value Ref Range Status   02/14/2024 0.85 L    12/19/2023 1.04 L    11/21/2023 0.85 L    10/24/2023 0.96 L      FVC-%Pred-Pre   Date Value Ref Range Status   02/14/2024 29 %    12/19/2023 36 %    11/21/2023 29 %    10/24/2023 33 %      FEV1-Pre   Date Value Ref Range Status   02/14/2024 0.80 L    12/19/2023 0.89 L    11/21/2023 0.78 L    10/24/2023 0.87 L      FEV1-%Pred-Pre   Date Value Ref Range Status   02/14/2024 34 %    12/19/2023 38 %    11/21/2023 33 %    10/24/2023 37 %        IMAGING    No results found for this or any previous visit (from the past 48 hour(s)).

## 2024-07-16 NOTE — PROGRESS NOTES
HEMODIALYSIS TREATMENT NOTE    Date: 7/16/2024  Time: 1820     Data:  Pre Wt:   54.2 kg (bed scale)  Desired Wt:   To be established  Post Wt:  50.1 kg (BED scale)  Weight change: - 2.5 kg  Ultrafiltration - Post Run Net Total Removed (mL):  2500 ml  Vascular Access Status: Fistula patent  Dialyzer Rinse:  Light  Total Blood Volume Processed: 75.6 L   Total Dialysis (Treatment) Time:   3.5 Hrs  Dialysate Bath: K 4, Ca 3  Heparin: Heparin 500 units loading + 500 units/hr     Lab:   No      Interventions:    Dialysis done through left AV Fistula using 15 gauge needles. ,   Initially set UF to 3L. Patient tachycardic mid run with steep crit line. UF goal reduced to 2.5L net total.   Symptoms alleviated by interventions. Tolerated treatment well.   Epogen administered per MAR    See Flowsheet for Crit Profile throughout the run  Treatment has ended safely and  blood is rinsed back completely  Decannulation done post HD, hemostasis is achieved in 15 minutes  Pressure dressing is applied, to be removed after 4-6 hours  Post Tx assessment done. Report given to PCN     Assessment:  A/O x 4, calm and cooperative, trached,denies pain    L arm AV Fistula has good thrill and bruit                Plan:    Per Renal team

## 2024-07-16 NOTE — PLAN OF CARE
Goal Outcome Evaluation:      Plan of Care Reviewed With: patient, spouse    Overall Patient Progress: improving    Outcome Evaluation: Patient tolerated PS x2. 10/5 both times. Multiple hours. Potentially transferring to LTACH later this week.    ICU End of Shift Summary. See flowsheets for vital signs and detailed assessment.    Changes this shift: VSS. PST x2 (10/5). Pt denies pain. No acute events.     Plan: Potential transfer to LTACH. Contingent on CXR on Wed.

## 2024-07-17 NOTE — PROGRESS NOTES
ICU Daily Rounding Checklist     Checklist Response Notes   Can sedation be reduced?  NA    Can analgesia be reduced? No Tylenol   Is delirium being assessed, addressed and prevented? Yes    Spontaneous awakening trial and/or Spontaneous breathing trial candidate?  Yes    Total fluid balance goal reviewed?  Yes Target Goals:        Per HD [12h]             Per HD [24h]   Is the patient at goals for lung protective ventilation? Yes    Head of bed elevation (30 degrees)? Yes    Skin breakdown assessment (prevention) completed? Yes    Is enteral nutrition at goal? Yes    Is blood glucose at goal? Yes    Deep venous thrombosis prophylaxis? Yes      Gastric ulcer prophylaxis?  Yes If coagulopathy (INR-1.5 PTT2x normal. Ph<50k), mechanical ventilation 48hr, history of GI bleed/ulcer within past year. TBL, SCI, or burn, or if >= 2 minor risk factors (sepsis, ICU stay 1 week, occult GI bleed > 6 days. glucocorticoid therapy, NSAID use, antiplatelet use)   Can Antibiotics be narrowed or discontinued? No    Early mobility candidate and physical therapy consulted? Yes    Is rivas catheter needed? No    Is central venous/arterial catheter needed? No    Has the family been updated? Yes    Are the patient's goals of care and code status current? Yes      Kaitlynn Mcadams MD  Internal Medicine PGY3

## 2024-07-17 NOTE — PROVIDER NOTIFICATION
07/17/24 1258   Weaning Assessment   Spontaneous Respiratory Rate 56 breaths/min   Spontaneous Tidal Volume (mL) 192 mL   Spontaneous Minute Ventilation 10.8 mL   RSBI 291.67   Wean Start Time  0925   Wean End Time  1258   Wean Time Calculated (min) 213     Pt completed a 3 1/2 hour PS trial 10/+5 30% this morning. (Results listed above.)  This evening, this pt was restarted on a PS trial at 1650 and will continue as tolerated. Please see flowsheets for further information.

## 2024-07-17 NOTE — PROGRESS NOTES
Care Management Follow Up    Length of Stay (days): 29    Expected Discharge Date:       Concerns to be Addressed: discharge planning  Likely discharge to [facility], awaiting family decision on facility  Patient plan of care discussed at interdisciplinary rounds: No    Anticipated Discharge Disposition: LTACH     Anticipated Discharge Services: None  Anticipated Discharge DME:      Patient/family educated on Medicare website which has current facility and service quality ratings: yes  Education Provided on the Discharge Plan: Yes  Patient/Family in Agreement with the Plan: yes    Referrals Placed by CM/SW:    Private pay costs discussed: Not applicable    Additional Information:  RNCC was notified that patient would be ready to go to [facility] tomorrow.  RNCC informed Silvana man and asked if bed would be available.  Silvana states that bed is available tomorrow and that they would need steroid plan.  RNCC informed Edin Davis of need for steroid plan.  RNCC set up stretcher ride through [facility] for tomorrow [id number].  RNCC updated  at bedside.    RNCC to fax PCS form tomorrow.    [facility] Transport-stretcher ride tomorrow [phone]  [phone]    [facility]  [phone]  [address] [place] [id number]    Nancy Campbell, LOUISE    Nurse Coordinator     Covering for: Saima    Phone: *[phone]    Social Work and Care Management Department    SEARCHABLE in [facility] - search CARE COORDINATOR    [place] ([phone]) Saturday & Sunday; ([phone]) [facility] Recognized Holidays    Units: 5A, 5B & 5C  Pager: [phone]    Units: 6B, 6C & 6D    Pager: [phone]    Units: 7A, 7B, 7C & 7D    Pager: [phone]    Units: 6A & ICU   Pager: [phone]    Units: 5 Ortho, 5MS & WB ED Pager: [phone]    Units: 6MS, 8A & 10 ICU  Pager [phone]

## 2024-07-17 NOTE — PROGRESS NOTES
ICU End of Shift Summary. See flowsheets for vital signs and detailed assessment.    Changes this shift: No acute events overnight, A&Ox4, able to make needs known, full vent setting overnight fiO2 30%, minimal secretions, in and out of afib overnight, MAP goal maintained, no BM overnight, TF at goal rate    Plan: CT scan this AM, rehab later this week

## 2024-07-17 NOTE — PROGRESS NOTES
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  The patient was admitted on 24 for progressive hypoxia with dyspnea and worsening hypercapnia in ED requiring intubation likely d/t post-obstructive PNA 2/2 right bronchus stenosis.  S/p IP bronch () with laser tissue debulking RMB stenosis, airway balloon dilation of RMB and BI, and placement of stent RMB to BI and bifurcating stent in RUL.  S/p CRRT (-), iHD resumed .  Recurrence of paroxysmal afib with RVR first noted . S/p trach with ENT on 7/3 and GJ tube with IR on .  Treating empirically for possible immune/inflammatory process vs organizing pneumonia (given GG changes on CT) with steroid burst/taper.  Gradual improvement in PST on ventilator.  Discharge to LTACH as soon as tomorrow pending bed availability.      Today's recommendations:  - Continue steroid burst, will finalize taper plan tomorrow based on imaging  - Vent weaning per MICU  - Resume PTA azithromycin at decreased dose (had been held for prolonged QTc), will need to monitor EKG  - Tacrolimus to be via G tube exclusively, repeat level  (ordered)  - Posaconazole per transplant ID   - CMV weekly monitoring (qT), VGCV ppx continued with increased steroid courses, duration TBD     Septic shock:  Acute on  chronic hypoxic/hypercapneic respiratory failure:  Post-obstructive multi-lobar PNA:  Right bronchial stenosis with RML collapse: Admitted with 2 weeks of progressive hypoxia, dyspnea, congested cough, and fatigue.  Chronic hypoxia with 2L NC, increased from 3 to 4L over this time with acute decompensation on day of admission associated with hypercapnia. Intubated in the ED. Negative comprehensive ID workup on admission. Procal mildly elevated at 0.24 initially (then elevated to 1.77 6/20), LA normal, but febrile to 100.7.  TTE on admission grossly normal.  CT with RUL/RLL consolidative opacities, RML collapse, and narrowing of BI and distal RLL bronchus.  Bronch (6/19) with severe stenosis starting at right anastomosis, inspection with smaller scope revealing for extensive RLL mucous plugging.  S/p IP bronch (6/20) with laser tissue debulking RMB stenosis, airway balloon dilation of RMB and BI, and placement of stent RMB to BI and bifurcating stent in RUL.  Respiratory cultures with Steno, VSE, VRE, Aspergillus ochraceus, and Elidia guilliermondii.  Chest CT (6/29) with multifocal panlobar opacities and small bibasilar pleural effusions.  S/p trach with ENT on 7/3 and GJ tube with IR on 7/5.  S/p stress dose steroids 6/21-7/5.  Chest CT (7/8) with waxing and waning mostly improved right lung consolidation and new diffuse GGO >BLL, increased GGO in KONRAD.  Steroid burst started 7/11 with notable improvement in PST and chest tightness.   - Steroid burst (7/11) with prednisone 1 mg/kg daily (50 mg) with taper plan TBD based on next CT scan   - Nebs: levalbuterol QID, Mucomyst 10% BID alternating with 3% HTS (6/22), will need to be on NS nebs BID upon discharge per IP  - Vent management and weaning per MICU  - Chest CT non-contrast today, will review at transplant conference tomorrow morning to determine steroid taper based on pathology dx     S/p bilateral sequential lung transplant (BSLT) for ILD:   Progressive CLAD:  Most recent OP visit in February.  DSA negative 7/3 (last positive noted 2018).  Repeat ImmuKnow (6/19) 87, very low but IST adjustment deferred per Dr. Graham given acuity and steroids (after ImmuKnow level).  Repeat Prospera remains high at 2.26 on 7/3 possibly related to recent infection, now on steroids. ImmuKnow remains low at 89 on 7/8, defer IST adjustment at this time.  - PTA Singulair  - Breo inhaler (on hold with trach)  - Azithromycin (held 7/10 for prolonged QTc), recommend to resume at decreased dose of 125 mg daily (ordered), continue to monitor QTc     Immunosuppression:  On 2-drug IST since November d/t leukopenia and recurrent infections  - Tacrolimus 1 mg qAM / 1.5 mg qPM (increased 7/14) to be via G tube exclusively (has been likely receiving via J tube).  Goal level 8-10.  Repeat level 7/18 (ordered).  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  S/p ABX as above.     Aspergillus ochraceus:  H/o Aspergillus empyema: S/p empyema drainage and ampho B instillation.  Previously on voriconazole, transitioned to posaconazole and managed by transplant ID as OP with last visit 3/13.  Calcified fungal elements remain with additional recurrent effusion (likely associated with fluid disturbances r/t iHD), but surgical intervention previously deferred d/t risk.  Posaconazole level 2.5 on 6/19.  Susceptibility testing done on fungal bronch culture 6/19 per transplant ID.  S/p micafungin 150 mg daily (6/25-7/9) per transplant ID.  - Posaconazole 300 mg daily chronically per transplant ID (signed off 7/9) with plan for transplant ID follow up ~6-8 weeks      H/o CMV viremia: Recurrent CMV viremia historically.  VGCV ppx most recently stopped 4/12.  Recent CMV low positive at 46 (6/12), <35 (6/18), 39 (6/25, 7/2) and now negative since 7/9.  - CMV weekly monitoring  (qTuesday) and VGCV ppx (6/24, renally dosed) with increased steroid courses, duration TBD     EBV viremia: S/p rituximab 12/13/23 x1 dose.  Most recent EBV negative 6/18.     Dilated CBD:  Suspected acute cholecystitis: MRCP (6/29) with CBD dilation with stones and cholelithiasis without cholecystitis.  GI signed off 6/30, recommending general surgery consult if RUQ pain recurs.     ESRD on iHD: Kidney evaluation started as OP.  On qMWF iHD schedule, no missed sessions.  Transitioned to CRRT on 6/20, stopped 7/2 and iHD resumed 7/4.  Management per renal and MICU.     Pancytopenia: WBC 2.2-3.2 since 7/7.  Platelets down to 45 on 7/9.  On VGCV as above.  Also intermittently requiring pRBC.  Peripheral smear (7/9) with moderate normochromic, normocytic anemia with no increase in erythrocyte regeneration, slight leukopenia, marked thrombocytopenia with normal platelet morphology, and no overt evidence of dysplasia. CBCstable.  - CBC with differential daily, consider G-CSF if ANC </=0.8     VRE urine culture: Noted 6/19, >100k GNB and VRE faecium, ABX as above with management per transplant ID and MICU.     We appreciate the excellent care provided by the MICU team.  Recommendations communicated via this note.  Will continue to follow along closely, please do not hesitate to call with any questions or concerns.     Patient discussed with Dr. James.    Hina Huff, DNP, APRN, CNP  Inpatient Nurse Practitioner  Pulmonary CF/Transplant     Subjective & Interval History:     Tolerated 3 hours of PST yesterday, limited by fatigue per pt.  Overall with improvement in chest tightness and WOB over the past several days.  Minimal secretions via ETT.  HD run yesterday for 2.5L UF, goal decreased a little d/t tachycardia during dialysis.    Review of Systems:     C: No fever, no chills  INTEGUMENTARY/SKIN: No rash or obvious new lesions  ENT/MOUTH: No nasal congestion or drainage  RESP: See interval history  CV: No chest pain,  "no palpitations, no peripheral edema, no orthopnea  GI: + Occ nausea, no vomiting, no change in stools, no reflux symptoms  : Oliguria  MUSCULOSKELETAL: No myalgias, no arthralgias  ENDOCRINE: Blood sugars with adequate control  NEURO: No headache, no numbness or tingling  PSYCHIATRIC: Mood stable    Physical Exam:     All notes, images, and labs from past 24 hours (at minimum) were reviewed.    Vital signs:  Temp: 97.3  F (36.3  C) Temp src: Oral BP: 101/68 Pulse: 81   Resp: 19 SpO2: 100 % O2 Device: Mechanical Ventilator Oxygen Delivery: 6 LPM Height: 160 cm (5' 3\") Weight: 54.2 kg (119 lb 7.8 oz)  I/O:   Intake/Output Summary (Last 24 hours) at 7/17/2024 0642  Last data filed at 7/16/2024 2000  Gross per 24 hour   Intake 610 ml   Output 2500 ml   Net -1890 ml     Constitutional: Sitting up in bed, in no apparent distress.   HEENT: Eyes with pink conjunctivae, anicteric.  Oral mucosa moist without lesions.   PULM: Good air flow bilaterally.  Loose rhonchi t/o.  Non-labored breathing on full vent support.  CV: Normal S1 and S2.  RRR.  No murmur, gallop, or rub.  No peripheral edema.   ABD: NABS, soft, nontender, nondistended.  PEG/J tube site cdi.  MSK: Moves all extremities.  No apparent muscle wasting.   NEURO: Alert and conversant by mouthing words.   SKIN: Warm, dry.  No rash on limited exam.   PSYCH: Mood stable.     Data:     LABS    CMP:   Recent Labs   Lab 07/17/24  0057 07/16/24  2044 07/16/24  1642 07/16/24  1315 07/16/24  0831 07/16/24  0547 07/15/24  0856 07/15/24  0641 07/14/24  0816 07/14/24  0656 07/13/24  1012 07/13/24  0706 07/11/24  0831 07/11/24  0518   NA  --   --   --   --   --  131*  --  133*  --  133*  --  132*   < > 131*   POTASSIUM  --   --   --   --   --  3.1*  --  3.1*  --  3.6  --  3.6   < > 3.8   CHLORIDE  --   --   --   --   --  92*  --  95*  --  97*  --  95*   < > 98   CO2  --   --   --   --   --  24  --  25  --  27  --  25   < > 25   ANIONGAP  --   --   --   --   --  15  --  13  -- " " 9  --  12   < > 8   * 161* 178* 168*   < > 166*   < > 157*   < > 187*   < > 159*   < > 122*   BUN  --   --   --   --   --  86.1*  --  59.7*  --  33.9*  --  65.2*   < > 57.5*   CR  --   --   --   --   --  2.84*  --  2.23*  --  1.50*  --  2.54*   < > 2.52*   GFRESTIMATED  --   --   --   --   --  18*  --  24*  --  39*  --  21*   < > 21*   CHELSEY  --   --   --   --   --  7.9*  --  8.3*  --  8.4*  --  8.4*   < > 8.6*   MAG  --   --   --   --   --  1.9  --  1.9  --  1.7  --  1.9   < >  --    PHOS  --   --   --   --   --  5.0*  --  4.5  --  3.3  --  4.8*   < >  --    PROTTOTAL  --   --   --   --   --   --   --   --   --   --   --   --   --  5.1*   ALBUMIN  --   --   --   --   --   --   --   --   --   --   --   --   --  2.6*   BILITOTAL  --   --   --   --   --   --   --   --   --   --   --   --   --  0.4   ALKPHOS  --   --   --   --   --   --   --   --   --   --   --   --   --  282*   AST  --   --   --   --   --   --   --   --   --   --   --   --   --  30   ALT  --   --   --   --   --   --   --   --   --   --   --   --   --  13    < > = values in this interval not displayed.     CBC:   Recent Labs   Lab 07/16/24  0547 07/15/24  0641 07/14/24  0656 07/13/24  0706   WBC 2.7* 2.5* 2.4* 2.9*   RBC 2.36* 2.38* 2.27* 2.23*   HGB 7.9* 7.8* 7.6* 7.3*   HCT 24.6* 25.5* 23.4* 22.9*   * 107* 103* 103*   MCH 33.5* 32.8 33.5* 32.7   MCHC 32.1 30.6* 32.5 31.9   RDW 24.5* 25.0* 24.3* 23.6*    150 153 137*       INR: No lab results found in last 7 days.    Glucose:   Recent Labs   Lab 07/17/24  0057 07/16/24  2044 07/16/24  1642 07/16/24  1315 07/16/24  0831 07/16/24  0547   * 161* 178* 168* 158* 166*       Blood Gas: No lab results found in last 7 days.    Culture Data No results for input(s): \"CULT\" in the last 168 hours.    Virology Data:   Lab Results   Component Value Date    FLUAH1 Not Detected 06/18/2024    FLUAH3 Not Detected 06/18/2024    LL5515 Not Detected 06/18/2024    IFLUB Not Detected 06/18/2024    " RSVA Not Detected 06/18/2024    RSVB Not Detected 06/18/2024    PIV1 Not Detected 06/18/2024    PIV2 Not Detected 06/18/2024    PIV3 Not Detected 06/18/2024    HMPV Not Detected 06/18/2024    HRVS Negative 01/24/2021    ADVBE Negative 01/24/2021    ADVC Negative 01/24/2021    ADVC Negative 12/23/2020    ADVC Negative 10/07/2019       Historical CMV results (last 3 of prior testing):  Lab Results   Component Value Date    CMVQNT <35 (A) 07/16/2024    CMVQNT Not Detected 07/09/2024    CMVQNT <35 (A) 06/18/2024     Lab Results   Component Value Date    CMVLOG <1.5 07/16/2024    CMVLOG 1.6 07/02/2024    CMVLOG 1.6 06/25/2024       Urine Studies    Recent Labs   Lab Test 06/19/24  1214 01/24/21  1729   URINEPH 6.5 5.0   NITRITE Negative Negative   LEUKEST Large* Moderate*   WBCU >182* 34*       Most Recent [place]ze Pulmonary Function Testing (FVC/FEV1 only)  FVC-Pre   Date Value Ref Range Status   02/14/2024 0.85 L    12/19/2023 1.04 L    11/21/2023 0.85 L    10/24/2023 0.96 L      FVC-%Pred-Pre   Date Value Ref Range Status   02/14/2024 29 %    12/19/2023 36 %    11/21/2023 29 %    10/24/2023 33 %      FEV1-Pre   Date Value Ref Range Status   02/14/2024 0.80 L    12/19/2023 0.89 L    11/21/2023 0.78 L    10/24/2023 0.87 L      FEV1-%Pred-Pre   Date Value Ref Range Status   02/14/2024 34 %    12/19/2023 38 %    11/21/2023 33 %    10/24/2023 37 %        IMAGING    No results found for this or any previous visit (from the past 48 hour(s)).

## 2024-07-17 NOTE — PLAN OF CARE
Goal Outcome Evaluation:      Plan of Care Reviewed With: patient, spouse    Overall Patient Progress: improvingOverall Patient Progress: improving    Outcome Evaluation: Patient tolerated PS for hours this morning[facility]    ICU End of Shift Summary. See flowsheets for vital signs and detailed assessment.    Changes this shift: No acute events. A&o x4. Can make needs known. PST 10/5 x 2. CT scan was completed.     [facility]

## 2024-07-17 NOTE — PROGRESS NOTES
"PALLIATIVE CARE PROGRESS NOTE  [facility]     Patient Name: Kecia Blue  Date of Admission: 6/18/2024        Recommendations & Counseling       GOALS OF CARE:   Life-prolonging without limits   As per previous discussions with palliative care provider, Kecia does not want any treatment that will only prolong her dying if there is no reasonable hope of recovery to an active life.   Palliative care will continue to support patient and family and we are available for any care conferences that may arise.     ADVANCE CARE PLANNING:  Patient has an advance directive dated January 2018.  Primary Health Care Agent  Ranjan.  Alternate(s) three daughters as co-alternatives.   There is no POLST form on file, defer to patient and/or next of kin for decisions   Code status: Full Code    MEDICAL MANAGEMENT:   We are not actively managing at this time.     PSYCHOSOCIAL/SPIRITUAL:  Family  and three daughters, children in law and grand children  Friends many back home in [place], [place]    Palliative Care will continue to follow. Thank you for the consult and allowing us to aid in the care of Kecia Blue.        Lissett Hunter MD  Securely message with [employer] (more info)  Text page via [facility] Paging/Directory   If you are not sure who specifically to contact -- please text the \"Palliative Care [facility]\" voice group in [employer].        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  The patient was admitted on " 6/18/24 for progressive hypoxia with dyspnea and worsening hypercapnia in ED requiring intubation likely d/t post-obstructive PNA 2/2 right bronchus stenosis.  Bronch confirming severe stenosis of right anastomosis with extensive RLL plugging.  IP bronch (6/20) with laser tissue debulking RMB stenosis, airway balloon dilation of RMB and BI, and placement of stent RMB to BI and bifurcating stent in RUL.  S/p volume resuscitation and transition to CRRT 6/20 for hypotension, stopped 7/2.  Increased agitation/delirium on 6/22 with increasing desaturation led to need for increased sedation.  Recurrence of paroxysmal afib with RVR first noted 6/25.  Remains intubated with pulmonary edema on imaging and septic shock requiring pressor support, limited/variable tolerance of PST.  She underwent Trach on 7/3/24 and PEG/J on July 5, 2024 and now plan is for LTACH in the coming days.     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               Physical Exam:   Temp:  [97.3  F (36.3  C)-98.2  F (36.8  C)] 97.3  F (36.3  C)  Pulse:  [] 81  Resp:  [16-32] 19  BP: ()/(54-81) 101/68  FiO2 (%):  [30 %] 30 %  SpO2:  [83 %-100 %] 100 %  119 lbs 7.83 oz    Gen: Alert, sitting up in bed, trach in place, working on her ipad, appears stated age, comfortable, in NAD,  engaged, appropriate, sensorium intact               Current Problem List:   Active Problems:    S/P lung transplant (H)    Bronchial stenosis, right    Acute on chronic respiratory failure with hypercapnia (H)      Allergies   Allergen Reactions    Isopropyl Alcohol Other (See Comments)     The alcohol burns the open areas on her skin when used as a skin prep for procedures    Vancomycin Other (See Comments)     Diffuse erythroderma with itching (improved with Benadryl) after receiving vancomycin over 1 hour. 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 Can use vancomycin in the future, but please give over slower infusion time (at least 2 hours) and premedicate with Benadryl.            Data Reviewed:     Reviewed recent labs and pertinent imaging  Recent Labs   Lab 07/17/24  1304 07/17/24  0744 07/17/24  0607 07/16/24  0831 07/16/24  0547 07/15/24  0856 07/15/24  0641 07/11/24  0831 07/11/24  0518   WBC  --   --  2.4*  --  2.7*  --  2.5*   < > 2.3*   HGB  --   --  8.0*  --  7.9*  --  7.8*   < > 7.3*   MCV  --   --  104*  --  104*  --  107*   < > 99   PLT  --   --  153  --  167  --  150   < > 63*   NA  --   --  133*  --  131*  --  133*   < > 131*   POTASSIUM  --   --  3.2*  --  3.1*  --  3.1*   < > 3.8   CHLORIDE  --   --  93*  --  92*  --  95*   < > 98   CO2  --   --  30*  --  24  --  25   < > 25   BUN  --   --  39.8*  --  86.1*  --  59.7*   < > 57.5*   CR  --   --  1.65*  --  2.84*  --  2.23*   < > 2.52*   ANIONGAP  --   --  10  --  15  --  13   < > 8   CHELSEY  --   --  8.2*  --  7.9*  --  8.3*   < > 8.6*   * 135* 150*   < > 166*   < > 157*   < > 122*   ALBUMIN  --   --   --   --   --   --   --   --  2.6*   PROTTOTAL  --   --   --   --   --   --   --   --  5.1*   BILITOTAL  --   --   --   --   --   --   --   --  0.4   ALKPHOS  --   --   --   --   --   --   --   --  282*   ALT  --   --   --   --   --   --   --   --  13   AST  --   --   --    --   --   --   --   --  30    < > = values in this interval not displayed.              Medical Decision Making

## 2024-07-17 NOTE — PROGRESS NOTES
MEDICAL ICU PROGRESS NOTE  07/17/2024      Date of Service (when I saw the patient): 07/17/2024    ASSESSMENT: Kecia Blue is a 63 year old female with PMH ILD s/p bilateral lung transplant (2018) c/b recurrent right bronchial stenosis s/p repeat balloon dilation/stenting, repeat opportunistic pneumonia (PJP 2021, aspergillus/stenotrophomonas 11/2023) and viremias (EBV, CMV), and ESRD 2/2 tacrolimus toxicity on HD who was admitted on 6/18/2024 for acute hypercapnic respiratory failure 2/2 tracheal stenosis and pneumonia.  Status post airway stent placement by IP on 6/20. Hospital course complicated by hypotension, delirium, and prolonged respiratory failure. Tracheostomy placement on 7/3 and PEGJ tube placement with IR on 7/5. Now persistent respiratory failure with minimal progress on PST, treating empirically for immune/inflammatory process. Discharge to LTACH pending plan for steroid taper.     Changes today:  - Per pulm transplant, continue prednisone 50 mg daily, until CT on 7/17; taper plan TBD imaging  - Continue BID SBT trials on pressure support 10/5 as tolerated  - IR to remove PEG/J buttons prior to transfer to LTAC  - Replete K with 20 mEq KCL po for K 3.2    PLAN:    Neuro:  #Pain   - Acetaminophen 1 gram q8h prn       #Toxic metabolic encephalopathy, resolved  Ongoing lethargy in setting of sepsis and delirium in the setting of high-dose steroid therapy. Patient reports improved sleep on current medications. Following commands this morning.   - Continue quetiapine to 25 mg at bedtime   - Ramalteon at bedtime  - trazodone 25mg for sleep  - delirium precautions  - Do not disturb order overnight to promote sleep    #Anxiety, improving  Patient has been having anxiety regarding pressure support trials and weaning from the ventilator.  and patient would like to talk to somebody while in the hospital. Will plan to consult Health Psychology tomorrow (as not here on weekends).   - Health  Psychology consulted, seeing patient once per week while admitted- improved    Pulmonary:  #Acute on chronic hypoxic hypercapnic respiratory failure, improving  #concern for possible immune/inflammatory process  #HAP, Stenotrophomonas maltophilia, Enterococcus faecalis, and Candida guilliermondi complex  #Severe pulmonary edema  #Acute respiratory distress syndrome, resolved  Presented to the ED on 6/18/2024 with acute on chronic hypoxic hypercapnic respiratory failure. Transferred to MICU and intubated on overnight on 6/18. Workup significant for Stenotrophomonas, Enterococcus, and Candida pneumonia. Course was c/b progression to ARDS (6/21-22) with elevated plateau pressures.  CXR (6/26) with similar pulmonary opacities compared to 6/24, left > right.  The etiology of this groundglass opacity was unclear but ddx includes rejection vs. infection vs. volume overload. Tracheostomy placed on 7/3 to assist with ventilator weaning. CT chest w/o contrast 7/8: Waxing and waning mostly improved right lung consolidation now with diffuse groundglass organizing opacities mostly in the lower lobe an middle lobe more than the upper lobe. Slight improvement in the peribronchiolar consolidation in the left lower lobe but there is increased groundglass organization in the left upper lobe.    - s/p tracheostomy 7/3-> ENT removed sutures and perform trach dressing change 7/8  - Pulmonary toilet  - Mucomyst BID  - Hypertonic saline BID, alternate with mucomyst  - Albuterol neb QID  - Antibiotics, as below  - Volume management with iHD  - Continue Daily SBT (AM and PM) on pressure support 10/5  - pulm transplant following    Vent Mode: CMV/AC  (Continuous Mandatory Ventilation/ Assist Control)  FiO2 (%): 30 %  Resp Rate (Set): 18 breaths/min  Tidal Volume (Set, mL): 320 mL  PEEP (cm H2O): 5 cmH2O  Pressure Support (cm H2O): 10 cmH2O  Resp: 19      #Recurrent right middle bronchus stenosis s/p dilation and stent (6/20/2024),  stable  Has history (bronchoscopy 2/15/24) demonstrating narrowing of right mainstem transplant anastomosis and bronchus intermedius. CT chest (6/18/2024) and bronchoscopy (6/19/2024) demonstrated occlusion of right middle bronchus. With concern for post-obstructive pneumonia, interventional pulmonology was consulted, and completed bronchoscopy (6/20/2024) with tissue debulking, right middle bronchus balloon dilation to 11 mm, stent placement in right main bronchus, and bifurcating cast placement in right upper bronchus. IP pulm re-evaluated bronchial stents with BAL and noted that they were open.    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   - Prospera (7/3) pending  - DSA (7/3) pending  - PTA nebs  - Fluticasone-viilanterol (breo ellipta) every day - HOLD with respiratory infection  - Montelukast every day   - Immunosuppressants per Tx Pulm:   - PTA Tacrolimus (dosing per tx pulm)  - PTA Prednisone 5 mg daily   - PTA azathioprine - HOLD per Transplant pulmonology with repeat infections  - Peripheral smear (7/9) pending for pancytopenia  - Antibiotic prophylaxis   - Bactrim MWF for PJP ppx (monitor need to adjust pending HD plan)  - CMV weekly monitoring (qTuesday)  - Azithromycin 125mg per pulmonary, continue to monitor QTc  - Continue VGCV ppx (renally dosed, monitor need to adjust pending HD plan) with tentative plan for 3 weeks beyond completion of stress dose steroids   - Per pulm transplant: Continue steroid burst with prednisone 1 mg/kg, currently 50 mg daily, until CT on 7/17; taper plan TBD imaging, but can start planning for LTACH discharge Thurs  - Hold PTA prednisone dose given burst dose      Cardiovascular:  #Septic  shock, resolved  On 6/19/2024, developed sepsis (hypotension 80s/50s, tachypnea 24) in the setting of stenotrophomonas pneumonia/enterococcus faecium VRE UTI. Etiology of shock likely distributive iso sepsis; less likely hypovolemic (not pulling volumes with CRRT), medication-associated (weaned off of propofol gtt) or obstructive (low suspicion for PE, echo 6/19 without evidence of cardiac dysfunction). Occasional episodes of hypotension overnight, but self-resolving and have not occurred in a couple days.  - s/p SDS & pressors and midodrine, off of all support agents  - MAPs 70s-80s, stable    #Demand Ischemia, resolved    Presented with elevated troponin (peak 499). Not associated with chest pain or hypoxia. EKG without ST elevations/depressions or T wave inversions. Suspect demand ischemia in the setting of sepsis. Will continue to monitor symptoms and continuous telemetry. EKG 7/2 sinus rhythm with PACs.     #Paroxysmal A-Fib, improved  #Pulmonary Hypertension   #Transient arhythmia with palpitations  History of pulmonary hypertension (45 mmHg, echo 10/2023) in the setting of ILD and paroxysmal afib on PTA metoprolol tartrate BID. Not on anticoagulation for afib. Transient unspecified arhythmia overnight (7/9) with palpitations. EKG 7/9 sinus rhythm with fusion complexes and known RBBB. Repeat EKG 7/10 showing sinus rhythm with no RBBB or fusion complexes. Patient given magnesium 1g and 20mEq K+ for K 3.3 this am. No further episodes of palpitations. Currently in sinus rhythm.  - continue PTA metoprolol tartrate 25mg BID  - CTM    #Prolonged Qtc, resolved  Repeat EKG 7/13 with QTC of 483.   - Azithromycin ppx restarted per pulm transplant  -Qtc 476 (7/17)  - CTM    GI/Nutrition:  #Nutrition  PEG-J placed by IR on 7/5, continue tube feeds.   - Nutrition consulted  - Tube feeds 30ml/hr; at goal    #Diarrhea, resolved  On 6/21, had 8 bowel movements. Occurred in the setting of scheduled bowel regimen and starting  new antibiotics (meropenem, levofloxacin). C diff negative.   - Holding PRN bowel regimen for loose stools  - Miralax PRN   - Senna prn  - formula changes made per nutrition      #Cholelithiasis with gallbladder wall thickening, resolved  During admission patient found to have up trending LFTs and fever in setting of GB wall thickening on CT abdomen/pelvis, now resolved. MRCP completed showing borderline dilated CBD up to 1.1 cm, no significant intrahepatic biliary dilation, no evidence of choledocholithiasis, no evidence of acute cholecystitis.    Renal/Fluids/Electrolytes:  #ESRD on iHD (M,W,F)  History of ESRD 2/2 Tacrolimus toxicity on HD (MWF). With soft blood pressures, placed RIJ (6/20/2024) and started CRRT (6/20/2024). US of AVF 7/5: arterial inflow patent without inflow stenosis, normal diameter of anastamosis, venous outflow elevated velocity at subclavian- suggestive of recurrent stenosis in venous outflow (area of prior angioplasty in March). IR consulted who noted that IR fistulogram not required at that time, and was able to run iHD on 7/6 off her fistula. Currently having some swelling in same arm that fistula is placed in  - Nephrology consulted and following for ESRD  - will follow recommendations for need of HD  - Renally-dosed medications  - BMP daily  - HD tomorrow  - Replete K 3.2 with 20mEq KCL po      Endocrine:  #Risk for hyperglycemia with high dose steroids  Stress dose steroids discontinued, resume PTA prednisone 5 mg daily. Blood sugars have remained in appropriate range.  - NPH 8U BID  - CTM     ID:  #HAP, Stenotrophomonas maltophilia, Enterococcus faecalis, Aspergillus, and Candida guilliermondi complex  Presented to the ED on 6/18/2024 with SOB and hypercapnic respiratory failure. Found on bronchoscopy (6/19) to have RML obstruction by narrowing bronchus intermedius as well as copious mucopurulent secretions/mucus plugging. Previously treated for Stenotrophonomas/aspergillus pneumonia  in 11/2023 with subsequent sputum culture (2/2024) negative for both Stenotrophonomas/Aspergillus. Etiology likely repeat Stenotrophomonas infection vs opportunistic infection from colonized microbe.   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    - Continue posaconazole 300 mg/day    - Complete 14 days of micafungin 150 mg/day today (6/25-7/9)- discontinued   - Continue VGCV prophylaxis  - Weekly CMV VL (Tuesdays, next 7/9), last was 39 (7/2)    - Azithromycin per pulmonary (holding due to QTC, but will follow up)  - TMP/SMX SS daily for PJP PPx (for life given h/o PJP)  - Awaiting susceptibilities on Aspergillus per transplant ID  - Present antibiotics   - Continue posaconazole 300 mg/day per pulm transplant (6/25 to present) for candida guillermondii and asergillus ochraceus on prior BAL  - Past antibiotics   - s/p micafungin 150 mg/day (6/25-7/9)  - s/p IV minocycline 100mg BD x 14 days total (6/20-7/5)- for stenotrophamonas  - PO linezolid 600 mg BID x 10 days (6/25-7/5)- for VRE in urine and BAL cultures  - S/p ceftazidime (6/25-6/28)    - S/p vancomycin (6/18-6/20)   - S/p zosyn (6/18-21)  - S/p Daptomycin (6/21-6/23)  - S/p Meropenem (6/21-6/24)  - S/p Levofloxacin (6/21-6/23)    - BAL fluid (7/3): pink, hazy, cell counts normal range, fluid sent for viral, bacterial and fungal cultures negative to date, cytology negative for malignancy and organisms; preliminary AFB  negative    #Pyuria, Enterococcus faecium VRE and  ESBL E. Coli   Asymptomatic. With progressive IV pressor needs, obtained broad infectious workup which demonstrated UCx (6/19/2024) with Enterococcus faecium VR and ESBL E. Coli. S/p Daptomycin (6/21-6/23) and Meropenem (6/21-6/24).    #Hx Aspergillus PNA (11/2023)  #Hx PJP PNA (1/2021)  #Hx CMV viremia, recurrent  #Hx EBV viremia  - Transplant ID consult, appreciate recs  PTA posaconazole (Treatment for hx of aspergillus PNA)  PTA azithromycin 250mg qd (CLAD ppx) (holding)  PTA bactrim (PJP ppx)  -CMV PCR 7/16 positive but <35     Hematology:    #Acute on Chronic Normocytic Anemia, stable  #Thrombocytopenia, chronic, improving  History of chronic anemia in the setting of kidney disease (baseline Hgb 10-11). Upon admission, Hgb 9. May be bone marrow suppression in the setting of chronic illness; potential contribution by blood loss with CRRT filter changes (~150-200c). Peripheral smear (6/18/2024) without evidence of schistocytes. Hemoglobin low today at 7.1, no signs of active bleeding.   - continue to monitor with CBC but improving & stable    Musculoskeletal:  - PT / OT consult - encourage ambulation as tolerated    Skin:  - Sacral Blanching- off load with frequent turns in bed    General Cares/Prophylaxis:    DVT Prophylaxis: subcutaneous heparin  GI Prophylaxis: PPI  Restraints: None  Family Communication:  updated bedside  Code Status: Full Code     Lines/tubes/drains:  - PIV x2  - Tracheostomy  - PEG/J    Disposition:  - Medical ICU  - Per pulm transplant okay for LTACH transfer Thursday after steroid taper plan finalized.     Patient seen and findings/plan discussed with medical ICU staff, Dr. Sanches.    Edin Davis MD  Internal Medicine Resident, PGY-1      Clinically Significant Risk Factors        # Hypokalemia: Lowest K = 3.1 mmol/L in last 2 days, will replace as needed       # Hypoalbuminemia: Lowest albumin = 2.2 g/dL at 6/21/2024   4:26 PM, will monitor as appropriate                   #Precipitous drop in Hgb/Hct: Lowest Hgb this hospitalization: 6.8 g/dL. Will continue to monitor and treat/transfuse as appropriate.      # Moderate Malnutrition: based on nutrition assessment    # Financial/Environmental Concerns: none                ====================================  INTERVAL HISTORY:   No acute events overnight. Continues to tolerate SBT trials well, completed 3 hours continuously yesterday. Denies pain or SOB. L forearm edema nearly resolved.     OBJECTIVE:   1. VITAL SIGNS:   Temp:  [97.3  F (36.3  C)-98.2  F (36.8  C)] 97.3  F (36.3  C)  Pulse:  [] 81  Resp:  [16-46] 19  BP: ()/(54-81) 101/68  FiO2 (%):  [30 %] 30 %  SpO2:  [83 %-100 %] 100 %  Vent Mode: CMV/AC  (Continuous Mandatory Ventilation/ Assist Control)  FiO2 (%): 30 %  Resp Rate (Set): 18 breaths/min  Tidal Volume (Set, mL): 320 mL  PEEP (cm H2O): 5 cmH2O  Pressure Support (cm H2O): 10 cmH2O  Resp: 19    2. INTAKE/ OUTPUT:   I/O last 3 completed shifts:  In: 975 [NG/GT:405]  Out: 2500 [Other:2500]    3. PHYSICAL EXAMINATION:  General: Sitting up in bed, NAD   HEENT: Atraumatic, moist mucous membranes, trach in place c/d/I.  Pulm/Resp: Ongoing coarse crackles diffusely improved compared to prior exam. Non-labored breathing on vent support.   CV: RRR, no m/r/g. No peripheral edema  Abdomen: Soft, non-distended, non-tender, bowel sounds present. PEG/J in place c/d/I.  Ext: Trace bilateral LE edema, pulses 2+ radial, pedal; stable 1+ pitting edema L forearm distal to AVF, no signs of bleeding  Incisions/Skin: No rashes or lesions  Neuro: Awake and alert x 3, follows commands and nods and shakes head to questions, moving all extremities equally    4. LABS:   Arterial Blood Gases   No lab results found in last 7 days.    Complete Blood Count   Recent Labs   Lab 07/16/24  0547 07/15/24  0641 07/14/24  0656 07/13/24  0706   WBC 2.7* 2.5* 2.4* 2.9*   HGB 7.9* 7.8* 7.6*  7.3*    150 153 137*     Basic Metabolic Panel  Recent Labs   Lab 07/17/24  0057 07/16/24  2044 07/16/24  1642 07/16/24  1315 07/16/24  0831 07/16/24  0547 07/15/24  0856 07/15/24  0641 07/14/24  0816 07/14/24  0656 07/13/24  1012 07/13/24  0706   NA  --   --   --   --   --  131*  --  133*  --  133*  --  132*   POTASSIUM  --   --   --   --   --  3.1*  --  3.1*  --  3.6  --  3.6   CHLORIDE  --   --   --   --   --  92*  --  95*  --  97*  --  95*   CO2  --   --   --   --   --  24  --  25  --  27  --  25   BUN  --   --   --   --   --  86.1*  --  59.7*  --  33.9*  --  65.2*   CR  --   --   --   --   --  2.84*  --  2.23*  --  1.50*  --  2.54*   * 161* 178* 168*   < > 166*   < > 157*   < > 187*   < > 159*    < > = values in this interval not displayed.     Liver Function Tests  Recent Labs   Lab 07/11/24  0518   AST 30   ALT 13   ALKPHOS 282*   BILITOTAL 0.4   ALBUMIN 2.6*       Coagulation Profile  No lab results found in last 7 days.      5. RADIOLOGY:   No results found for this or any previous visit (from the past 24 hour(s)).

## 2024-07-18 PROBLEM — J96.21 ACUTE ON CHRONIC HYPOXIC RESPIRATORY FAILURE (H): Status: ACTIVE | Noted: 2024-01-01

## 2024-07-18 NOTE — PROGRESS NOTES
Care Management Discharge Note    Discharge Date:  7/18/24        Discharge Disposition: LTACH    Discharge Services: Transportation Services    Discharge DME:  vent    Discharge Transportation: other (see comments) (patient will need medical transport)    Private pay costs discussed:  Per insurance contract     Does the patient's insurance plan have a 3 day qualifying hospital stay waiver?  No    PAS Confirmation Code:  N/a   Patient/family educated on Medicare website which has current facility and service quality ratings: yes    Education Provided on the Discharge Plan: Yes  Persons Notified of Discharge Plans: Patient, RN, MD.   Patient/Family in Agreement with the Plan: yes    Handoff Referral Completed: Yes    Additional Information:  RNCC confirmed with [facility] admission that patient has been reviewed and accepted by Dr. Mack. MD to MD number has been provided for Dr. Myron LANCASTER 2 attending.     RNCC provided RN to RN report number #[phone].     Transportation has been scheduled on 7/17. See RNCC notes.     PCS form  completed and faxed. Discharge records printed and waiting for the discharge summary.     Patient is agreeable to discharge plan to [facility].    RNCC left voice message for patient's spouse  Ranjan, nimesh [phone].     Saima Lucio, MSN, MA, CCM, RN  4C/4A/4E ICU Care Coordinator  Phone:[phone]  Pager: [phone]    For Weekend & Holiday on call RN Care Coordinator:  [place] [facility] ([phone]) Saturday & Sunday; ([phone]) [facility] Recognized Holidays   Weekend 4C/4A/4E ICUs /6A Care Coordinator  Pager: [phone]

## 2024-07-18 NOTE — PROGRESS NOTES
Transferred to: [facility] at 1400  Status at time of transfer: A&Ox4, VSS  Belongings: Sent with patient/  Basilio removed? (if no, why?): NA  Chart and medications: Chart sent with patient  Family notified:  At bedside

## 2024-07-18 NOTE — PLAN OF CARE
Problem: Adult Inpatient Plan of Care  Goal: Optimal Comfort and Wellbeing  Outcome: Not Progressing   Goal Outcome Evaluation:  Pt reported difficulty breathing and chest pain.  Sepsis camila call fired and it was followed.  Rt present and gave a neb.  Dr Hardy updated and came into room  for assessment.  Ativan given via IV, Pt reported this helped a little helpful.  Report given to EMTs,  Pt sent out to [facility] lights and sirens.   at bedside entire visit.   gather pt belongings and planning on meeting pt at [facility].

## 2024-07-18 NOTE — H&P
[facility]    History and Physical - Hospitalist Service       Date of Admission:  7/18/2024    Brief History:    Kecia Blue is a 63 year old female with PMH ILD s/p bilateral lung transplant (2018) c/b recurrent right bronchial stenosis s/p repeat balloon dilation/stenting, repeat opportunistic pneumonia (PJP 2021, aspergillus/stenotrophomonas 11/2023) and viremias (EBV, CMV), and 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 Hospital course complicated by hypotension, delirium, and prolonged respiratory failure.  She has a tracheostomy placement on 7/3 and PEG-J tube placement with IR on 7/5.     Assessment & Plan      #Acute on chronic hypoxic hypercapnic respiratory failure, improving  #Concern for possible immune vs inflammatory process, improving  #HAP, Stenotrophomonas maltophilia, Enterococcus faecalis, and Candida guilliermondi complex  #Severe pulmonary edema  #Acute respiratory distress syndrome, resolved  Presented to the ED on 6/18/2024 with acute on chronic hypoxic hypercapnic respiratory failure. Transferred to MICU and intubated on overnight on 6/18. Workup significant for Stenotrophomonas, Enterococcus, and Candida pneumonia. Course was c/b progression to ARDS (6/21-22). CXR (6/26) with similar pulmonary opacities compared to 6/24, left > right.  The etiology of this groundglass opacity was unclear but ddx includes rejection vs. infection vs. volume overload. Tracheostomy placed on 7/3 due to inability to wean from ventilator. CT chest w/o contrast 7/8 demonstrated waxing and waning mostly improved right lung consolidation now with diffuse groundglass organizing opacities mostly in the lower lobe an middle lobe more than the upper lobe. Slight improvement in the peribronchiolar consolidation in the left lower lobe but there is increased groundglass  organization in the left upper lobe. CT on 7/17 demonstrated improvement in bibasilar predominant GGO.  - Pulmonary toilet  - Mucomyst BID  - Hypertonic saline BID, alternate with mucomyst  - Albuterol neb QID  - Antibiotics as below  - Volume management with iHD  - Continue Daily SBT (AM and PM) on pressure support 10/5  - Pulm Transplant following while inpatient              - Plan for transplant pulmonology follow-up in 6-8 weeks     Vent Mode: CMV/AC  (Continuous Mandatory Ventilation/ Assist Control)  FiO2 (%): 30 %  Resp Rate (Set): 18 breaths/min  Tidal Volume (Set, mL): 320 mL  PEEP (cm H2O): 5 cmH2O  Pressure Support (cm H2O): 10 cmH2O  Resp: 25    #Recurrent right middle bronchus stenosis s/p dilation and stent (6/20/2024), stable  Has history (bronchoscopy 2/15/24) demonstrating narrowing of right mainstem transplant anastomosis and bronchus intermedius. CT chest (6/18/2024) and bronchoscopy (6/19/2024) demonstrated occlusion of right middle bronchus. With concern for post-obstructive pneumonia, interventional pulmonology was consulted, and completed bronchoscopy (6/20/2024) with tissue debulking, right middle bronchus balloon dilation to 11 mm, stent placement in right main bronchus, and bifurcating cast placement in right upper bronchus. IP pulm re-evaluated bronchial stents with BAL and noted that they were open.    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   - Prospera (7/3) 2.26  - DSA (7/3) negative  - PTA nebs  - Fluticasone-viilanterol (breo ellipta) every day - HOLD with respiratory infection  - Montelukast every day   - Immunosuppressants per Tx Pulm:   - PTA Tacrolimus 1.5mg BID  - PTA Prednisone 5 mg daily   - PTA  azathioprine - HOLD per Transplant pulmonology with repeat infections  - Peripheral smear (7/9) pending for pancytopenia  - Antibiotic prophylaxis               - Bactrim MWF for PJP ppx (monitor need to adjust pending HD plan)  - CMV weekly monitoring (qTuesday)  - Azithromycin 125mg per pulmonary, continue to monitor QTc  - Continue VGCV ppx (renally dosed, monitor need to adjust pending HD plan) with tentative plan for 3 weeks beyond completion of stress dose steroids   - Steroid Taper per Transplant Pulmonology as below:  Date Daily dose (mg)   7/11 50   7/25 45   8/8 40   8/22 35   9/5 30   9/19 25   10/17 20   11/14 15   12/12 10   1/9 5      - Hold PTA prednisone dose while completing steroid taper as above    #HAP, Stenotrophomonas maltophilia, Enterococcus faecalis, Aspergillus, and Candida guilliermondi complex  Presented to the ED on 6/18/2024 with SOB and hypercapnic respiratory failure. Found on bronchoscopy (6/19) to have RML obstruction by narrowing bronchus intermedius as well as copious mucopurulent secretions/mucus plugging. Previously treated for Stenotrophonomas/aspergillus pneumonia in 11/2023 with subsequent sputum culture (2/2024) negative for both Stenotrophonomas/Aspergillus. Etiology likely repeat Stenotrophomonas infection vs opportunistic infection from colonized microbe.   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                - Continue posaconazole 300 mg/day                - Complete 14 days of  micafungin 150 mg/day today (6/25-7/9)- discontinued   - Continue VGCV prophylaxis  - Weekly CMV VL (Tuesdays, next 7/9), last was 39 (7/2)         - Azithromycin per pulmonary (holding due to QTC, but will follow up)  - TMP/SMX SS daily for PJP PPx (for life given h/o PJP)  - Awaiting susceptibilities on Aspergillus per transplant ID  - Present antibiotics               - Continue posaconazole 300 mg/day per pulm transplant (6/25 to present) for candida guillermondii and asergillus ochraceus on prior BAL  - Past antibiotics              - s/p micafungin 150 mg/day (6/25-7/9)  - s/p IV minocycline 100mg BD x 14 days total (6/20-7/5)- for stenotrophamonas  - PO linezolid 600 mg BID x 10 days (6/25-7/5)- for VRE in urine and BAL cultures  - S/p ceftazidime (6/25-6/28)               - S/p vancomycin (6/18-6/20)              - S/p zosyn (6/18-21)  - S/p Daptomycin (6/21-6/23)  - S/p Meropenem (6/21-6/24)  - S/p Levofloxacin (6/21-6/23)     - BAL fluid (7/3): pink, hazy, cell counts normal range, fluid sent for viral, bacterial and fungal cultures negative to date, cytology negative for malignancy and organisms; preliminary AFB negative    #Hx Aspergillus PNA (11/2023)  #Hx PJP PNA (1/2021)  #Hx CMV viremia, recurrent  #Hx EBV viremia  - Transplant ID consult, appreciate recs  PTA posaconazole (Treatment for hx of aspergillus PNA)  PTA azithromycin 250mg qd (CLAD ppx) (holding)  PTA bactrim (PJP ppx)  -CMV PCR 7/16 positive but <35    #ESRD on iHD (M,W,F)  History of ESRD 2/2 Tacrolimus toxicity on HD (MWF). With soft blood pressures, placed RIJ (6/20/2024) and started CRRT (6/20/2024). US of AVF 7/5: arterial inflow patent without inflow stenosis, normal diameter of anastamosis, venous outflow elevated velocity at subclavian- suggestive of recurrent stenosis in venous outflow (area of prior angioplasty in March). IR consulted who noted that IR fistulogram not required at that time, and was able to run iHD on beginning  on 7/6 without issue.   - Nephrology consulted and following for ESRD, patient tolerating 3x weekly HD at time of discharge  - Renally-dosed medications  - BMP daily  - HD T/ Th/ Sa schedule now, may need to transition back to M/W/F following discharge from [facility]  - Replete K 3.2 with 20mEq KCL po     #Anxiety, improving  Patient has been having anxiety regarding pressure support trials and weaning from the ventilator. Health Psychology consulted and followed patient weekly while hospitalized.     #Toxic metabolic encephalopathy, resolved  Patient noted to have lethargy in setting of sepsis and delirium in the setting of high-dose steroid therapy which improved with management of underlying sepsis. Patient mental status returned to baseline at day of discharge.   - Quetiapine to 25 mg at bedtime   - Ramalteon at bedtime  - Trazodone 25mg for sleep  - Delirium precautions  - Do not disturb order overnight to promote sleep     #Septic shock, resolved  On 6/19/2024, developed sepsis in the setting of stenotrophomonas pneumonia/enterococcus faecium VRE UTI. Etiology of shock likely distributive iso sepsis; less likely hypovolemic (not pulling volumes with CRRT), medication-associated (weaned off of propofol gtt) or obstructive (low suspicion for PE, echo 6/19 without evidence of cardiac dysfunction). Occasional episodes of hypotension overnight, but self-resolving and have not occurred in a couple days. Related to HAP, resolved with treatment of infection as below.   - s/p SDS & pressors and midodrine, off of all support agents  - MAPs 70s-80s, stable     #Demand Ischemia, resolved    Presented with elevated troponin (peak 499). Not associated with chest pain or hypoxia. EKG without ST elevations/depressions or T wave inversions. Suspect demand ischemia in the setting of sepsis. Will continue to monitor symptoms and continuous telemetry. EKG 7/2 sinus rhythm with PACs.     #Paroxysmal A-Fib, improved  #Pulmonary  Hypertension   #Transient arhythmia with palpitations  History of pulmonary hypertension (45 mmHg, echo 10/2023) in the setting of ILD and paroxysmal afib on PTA metoprolol tartrate BID. Not on anticoagulation for afib. Transient unspecified arhythmia overnight (7/9) with palpitations. EKG 7/9 sinus rhythm with fusion complexes and known RBBB. Repeat EKG 7/10 showing sinus rhythm with no RBBB or fusion complexes. Patient given magnesium 1g and 20mEq K+ for K 3.3 this am. No further episodes of palpitations. Currently in sinus rhythm.  - continue PTA metoprolol tartrate 25mg BID  - CTM     #Prolonged Qtc, resolved  Repeat EKG 7/13 with QTC of 483.   - Azithromycin ppx restarted per pulm transplant  -Qtc 476 (7/17)  -Daily EKG       Severe protein calorie deficiency  PEG-J placed by IR on 7/5, continue tube feeds.   - Nutrition consulted  - Tube feeds 30ml/hr; at goal     #Diarrhea, resolved  On 6/21, had 8 bowel movements. Occurred in the setting of scheduled bowel regimen and starting new antibiotics (meropenem, levofloxacin). C diff negative.   - Holding PRN bowel regimen for loose stools  - Miralax PRN   - Senna prn  - formula changes made per nutrition      #Cholelithiasis with gallbladder wall thickening, resolved  During admission patient found to have up trending LFTs and fever in setting of GB wall thickening on CT abdomen/pelvis, now resolved. MRCP completed showing borderline dilated CBD up to 1.1 cm, no significant intrahepatic biliary dilation, no evidence of choledocholithiasis, no evidence of acute cholecystitis.          #Risk for hyperglycemia with high dose steroids  Stress dose steroids discontinued, resume PTA prednisone 5 mg daily. Blood sugars have remained in appropriate range.  - NPH 8U BID  - CTM        #Pyuria, Enterococcus faecium VRE and  ESBL E. Coli   Asymptomatic. With progressive IV pressor needs, obtained broad infectious workup which demonstrated UCx (6/19/2024) with Enterococcus  faecium VR and ESBL E. Coli. S/p Daptomycin (6/21-6/23) and Meropenem (6/21-6/24).       #Acute on Chronic Normocytic Anemia, stable  #Thrombocytopenia, chronic, improving  History of chronic anemia in the setting of kidney disease (baseline Hgb 10-11). Upon admission, Hgb 9. May be bone marrow suppression in the setting of chronic illness; potential contribution by blood loss with CRRT filter changes (~150-200c). Peripheral smear (6/18/2024) without evidence of schistocytes.          Diet: Adult Formula Drip Feeding: Continuous Novasource Renal; Gastrostomy; Goal Rate: 30; mL/hr  DVT Prophylaxis: Heparin SQ  Basilio Catheter: Not present  Lines: None     Cardiac Monitoring: None  Code Status: Full Code      Clinically Significant Risk Factors Present on Admission        # Hypokalemia: Lowest K = 3.2 mmol/L in last 2 days, will replace as needed     # Hypomagnesemia: Lowest Mg = 1.6 mg/dL in last 2 days, will replace as needed            # Anemia: based on hgb <11               # Financial/Environmental Concerns:           Disposition Plan     Medically Ready for Discharge: Anticipated in 5+ Days           Dulce Hardy MD  Hospitalist Service  [facility]  Securely message with [employer] (more info)  Text page via [email or web address] Paging/Directory     ______________________________________________________________________    Chief Complaint   dyspnea      History of Present Illness     Kecia Blue is a 63 year old female with PMH ILD s/p bilateral lung transplant (2018) c/b recurrent right bronchial stenosis s/p repeat balloon dilation/stenting, repeat opportunistic pneumonia (PJP 2021, aspergillus/stenotrophomonas 11/2023) and viremias (EBV, CMV), and 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 Hospital course complicated by hypotension, delirium, and prolonged respiratory  failure.  She has a tracheostomy placement on 7/3 and PEG-J tube placement with IR on 7/5.     Past Medical History    Past Medical History:   Diagnosis Date    Acute on chronic respiratory failure with hypoxia (H) 02/21/2018    Anisocoria     Antisynthetase syndrome (H24) 2014    Anxiety     Aspergillus (H)     Aspergillus pneumonia (H) 11/20/2020    Benign essential hypertension     C. difficile colitis     Cytomegalovirus (CMV) viremia (H)     Dermatomyositis (H)     Dysplasia of cervix, low grade (ESTRADA 1)     EBV (Franklin-Barr virus) viremia     ESRD (end stage renal disease) on dialysis (H)     ILD (interstitial lung disease) (H)     Lung replaced by transplant (H)     Osteopenia     Paroxysmal atrial fibrillation (H)     Pneumocystis jiroveci pneumonia (H)     PONV (postoperative nausea and vomiting)     Post-menopause     Pulmonary hypertension (H)     Raynaud's disease     Seronegative rheumatoid arthritis (H)        Past Surgical History   Past Surgical History:   Procedure Laterality Date    BRONCHOSCOPY (RIGID OR FLEXIBLE), DIAGNOSTIC N/A 04/10/2018    Procedure: COMBINED BRONCHOSCOPY (RIGID OR FLEXIBLE), LAVAGE;;  Surgeon: Mariposa Donohue MD;  Location: [facility]    BRONCHOSCOPY (RIGID OR FLEXIBLE), DIAGNOSTIC N/A 12/23/2020    Procedure: BRONCHOSCOPY, WITH BRONCHOALVEOLAR LAVAGE;  Surgeon: Uri Bass MD;  Location: [facility]    BRONCHOSCOPY (RIGID OR FLEXIBLE), DIAGNOSTIC N/A 05/26/2022    Procedure: BRONCHOSCOPY, WITH BRONCHOALVEOLAR LAVAGE;  Surgeon: Uri Bass MD;  Location: [facility]    BRONCHOSCOPY (RIGID OR FLEXIBLE), DIAGNOSTIC N/A 08/17/2023    Procedure: BRONCHOSCOPY, WITH BRONCHOALVEOLAR LAVAGE;  Surgeon: Esau Bruno MD;  Location: [facility]    BRONCHOSCOPY (RIGID OR FLEXIBLE), DIAGNOSTIC N/A 11/01/2023    Procedure: BRONCHOSCOPY, WITH BRONCHOALVEOLAR LAVAGE;  Surgeon: Beto Magaña DO;  Location: [facility]    BRONCHOSCOPY (RIGID OR FLEXIBLE), DILATE BRONCHUS / TRACHEA N/A  10/11/2018    Procedure: BRONCHOSCOPY (RIGID OR FLEXIBLE), DILATE BRONCHUS / TRACHEA;  Flexible And Rigid Bronchoscopy and Dilation;  Surgeon: Wilber Lin MD;  [facility]    BRONCHOSCOPY (RIGID OR FLEXIBLE), DILATE BRONCHUS / TRACHEA N/A 11/01/2023    Procedure: Bronchoscopy (Rigid Or Flexible), Dilate Bronchus / Trachea;  Surgeon: Beto Magaña DO;  Location: [facility]    BRONCHOSCOPY FLEXIBLE N/A 03/13/2018    Procedure: BRONCHOSCOPY FLEXIBLE;  Flexible Bronchoscopy ;  Surgeon: Gissell Sanchez MD;  Location: [facility]    BRONCHOSCOPY FLEXIBLE N/A 05/09/2018    Procedure: BRONCHOSCOPY FLEXIBLE;;  Surgeon: Wilber Lin MD;  Location: [facility]    BRONCHOSCOPY FLEXIBLE AND RIGID N/A 09/10/2018    Procedure: BRONCHOSCOPY FLEXIBLE AND RIGID;  Flexible and Rigid Bronchoscopy with Balloon Dilation, tissue debulking with cryo, and Right mainstem bronchus stent placement;  Surgeon: Wilber Lin MD;  [facility]    BRONCHOSCOPY RIGID N/A 06/06/2018    Procedure: BRONCHOSCOPY RIGID;;  Surgeon: Lopez Macias MD;  Location: [facility]    BRONCHOSCOPY, DILATE BRONCHUS, STENT BRONCHUS, COMBINED N/A 06/11/2018    Procedure: COMBINED BRONCHOSCOPY, DILATE BRONCHUS, STENT BRONCHUS;  Flexible Bronchoscopy, Balloon Dilation, Bronchial Washings;  Surgeon: Wilber Lin MD;  [facility]    BRONCHOSCOPY, DILATE BRONCHUS, STENT BRONCHUS, COMBINED Right 07/10/2018    Procedure: COMBINED BRONCHOSCOPY, DILATE BRONCHUS, STENT BRONCHUS;  Flexible Bronchoscopy, Balloon Dilation, Bronchial Washings  ;  Surgeon: Wilber Lin MD;  [facility]    BRONCHOSCOPY, DILATE BRONCHUS, STENT BRONCHUS, COMBINED N/A 08/02/2018    Procedure: COMBINED BRONCHOSCOPY, DILATE BRONCHUS, STENT BRONCHUS;  Flexible Bronchoscopy, Bronchial Washings, Balloon Dilation;  Surgeon: Wilber Lin MD;  [facility]    BRONCHOSCOPY, DILATE BRONCHUS, STENT BRONCHUS, COMBINED N/A 08/20/2018     Procedure: COMBINED BRONCHOSCOPY, DILATE BRONCHUS, STENT BRONCHUS;  Flexible Bronchoscopy, Balloon Dilation;  Surgeon: Wilber Lin MD;  [facility]    BRONCHOSCOPY, DILATE BRONCHUS, STENT BRONCHUS, COMBINED N/A 10/29/2018    Procedure: Flexible Bronchoscopy, Balloon Dilation, Stent Revision, Airway Examination And Therapeutic Suctioning, Cyro Tumor Debulking;  Surgeon: Wilber Lin MD;  [facility]    BRONCHOSCOPY, DILATE BRONCHUS, STENT BRONCHUS, COMBINED N/A 11/20/2018    Procedure: Rigid Bronchoscopy, Stent Removal and dilitation;  Surgeon: Wilber Lin MD;  [facility]    BRONCHOSCOPY, DILATE BRONCHUS, STENT BRONCHUS, COMBINED N/A 12/14/2018    Procedure: Flexible And Rigid Bronchoscopy, Balloon Dilation, Bronchial Washing;  Surgeon: Wilber Lin MD;  [facility]    BRONCHOSCOPY, DILATE BRONCHUS, STENT BRONCHUS, COMBINED N/A 01/17/2019    Procedure: Flexible And Rigid Bronchoscopy, Balloon Dilation.;  Surgeon: Wilber Lin MD;  [facility]    BRONCHOSCOPY, DILATE BRONCHUS, STENT BRONCHUS, COMBINED N/A 03/07/2019    Procedure: Flexible and Rigid Bronchoscopy, Bronchial Washing, Balloon Dilation;  Surgeon: Wilber Lin MD;  [facility]    BRONCHOSCOPY, DILATE BRONCHUS, STENT BRONCHUS, COMBINED N/A 06/06/2019    Procedure: Rigid and Flexible Bronchoscopy, Balloon Dilation;  Surgeon: Wilber Lin MD;  [facility]    BRONCHOSCOPY, DILATE BRONCHUS, STENT BRONCHUS, COMBINED N/A 10/11/2019    Procedure: Flexible and Rigid Bronchoscopy, Balloon Dilation, Bronchoalveolar Lagave;  Surgeon: Wilber Lin MD;  [facility]    BRONCHOSCOPY, DILATE BRONCHUS, STENT BRONCHUS, COMBINED N/A 02/19/2021    Procedure: BRONCHOSCOPY, flexible, airway dilation, and bronchial wash;  Surgeon: Wilber Lin MD;  [facility]    BRONCHOSCOPY, DILATE BRONCHUS, STENT BRONCHUS, COMBINED N/A 04/09/2021    Procedure:  BRONCHOSCOPY, flexible and rigid, Airway suctioning;  Surgeon: Mati Norris MD;  [facility]    BRONCHOSCOPY, DILATE BRONCHUS, STENT BRONCHUS, COMBINED N/A 10/17/2023    Procedure: RIGID, flexible bronchoscopy with airway dilation;  Surgeon: Preet Patel MD;  [facility]    BRONCHOSCOPY, DILATE BRONCHUS, STENT BRONCHUS, COMBINED N/A 6/20/2024    Procedure: RIGID AND FLEXIBLE BRONCHOSCOPY, BALLOON DILATION, TISSUE DEBULKING WITH C02 LASER, STENT PLACEMENT;  Surgeon: Juana Baugh MD;  [facility]    CV RIGHT HEART CATH MEASUREMENTS RECORDED N/A 03/10/2020    Procedure: CV RIGHT HEART CATH;  Surgeon: Wai Garcia MD;  [facility]    ENT SURGERY      tonsillectomy as a child    ESOPHAGOSCOPY, GASTROSCOPY, DUODENOSCOPY (EGD), COMBINED N/A 10/29/2018    Procedure: COMBINED ESOPHAGOSCOPY, GASTROSCOPY, DUODENOSCOPY (EGD) with biopsies and polypectomy;  Surgeon: Chente Bloom MD;  [facility]    INSERT EXTRACORPORAL MEMBRANE OXYGENATOR ADULT (OUTSIDE OR) N/A 02/27/2018    Procedure: INSERT EXTRACORPORAL MEMBRANE OXYGENATOR ADULT (OUTSIDE OR);  INSERT EXTRACORPORAL MEMBRANE OXYGENATOR ADULT (OUTSIDE OR) ;  Surgeon: Toyb Hernandez MD;  [facility]    IR CVC TUNNEL PLACEMENT > 5 YRS OF AGE  10/25/2019    IR DIALYSIS FISTULOGRAM LEFT  03/02/2021    IR DIALYSIS FISTULOGRAM LEFT  11/02/2023    IR DIALYSIS FISTULOGRAM LEFT  03/05/2024    IR DIALYSIS MECH THROMB, PTA  03/02/2021    IR DIALYSIS PTA  11/02/2023    IR FLUORO 0-1 HOUR  05/07/2021    IR GASTRO JEJUNOSTOMY TUBE PLACEMENT  02/16/2021    IR GASTRO JEJUNOSTOMY TUBE PLACEMENT  7/5/2024    IR PARACENTESIS  01/08/2020    IR THORACENTESIS  09/13/2019    IR TRANSCATHETER BIOPSY  01/08/2020    LASER CO2 BRONCHOSCOPY N/A 04/30/2021    Procedure: Flexible and Rigid Bronchoscopy and Tissue Debulking with CO2 Laser Assistance;  Surgeon: Mati Norris MD;  [facility]    LASER CO2 BRONCHOSCOPY  N/A 06/11/2021    Procedure: BRONCHOSCOPY, Flexible and Rigid Bronchoscopy, Tissue Debulking with cryo Assistance, airway dilation,;  Surgeon: Mati Norris MD;  [facility]    LASER CO2 BRONCHOSCOPY N/A 09/16/2021    Procedure: BRONCHOSCOPY, flexible and rigid, CO2 Laser Debulking;  Surgeon: Mati Norris MD;  [facility]    LASER CO2 BRONCHOSCOPY N/A 02/11/2022    Procedure: flexible, rigid bronchoscopy, tissue debulking, airway dilation, co2 laser, bronchoalveolar lavage;  Surgeon: Juana Baugh MD;  [facility]    LASER CO2 BRONCHOSCOPY Right 11/17/2023    Procedure: flexible bronchosocopy, tissue/tumor debulking, using CO2 laser, mitomycin application and argon plasma coagulation;  Surgeon: Mati Norris MD;  [facility]    LASER CO2 BRONCHOSCOPY N/A 02/15/2024    Procedure: Flexible, Rigid Bronchoscopy,Tumor/Tissue debulking using Carbon Dioxide (CO2) laser; balloon dilation;  Surgeon: Wilber Lin MD;  [facility]    MIDLINE SINGLE LUMEN PLACEMENT Right 06/19/2024    3FR SL midline.    no prior surgery      REMOVE EXTRACORPORAL MEMBRANE OXYGENATOR ADULT N/A 03/03/2018    Procedure: REMOVE EXTRACORPORAL MEMBRANE OXYGENATOR ADULT;  Removal of Right Femoral Venous and Right Axillary Arterial Extracorporeal Membrane Oxygenator;  Surgeon: Toby Hernandez MD;  [facility]    TRACHEOSTOMY N/A 7/3/2024    Procedure: Tracheostomy;  Surgeon: Cris Liu MD;  [facility]    TRANSPLANT LUNG RECIPIENT SINGLE X2 Bilateral 03/01/2018    Procedure: TRANSPLANT LUNG RECIPIENT SINGLE X2;  Median Sternotomy, Extracorporeal Membrane Oxygenator, bilateral sequential lung transplant;  Surgeon: Toby Hernandez MD;  [facility]       Prior to Admission Medications   Prior to Admission Medications   Prescriptions Last Dose Informant Patient Reported? Taking?   Magnesium Cl-Calcium Carbonate (SLOW-MAG) 71.5-119 MG TBEC   No No   Sig: Take 2 tablets by mouth  four times a week Take on non dialysis days T/Th/Sa/Su   acetaminophen (TYLENOL) 325 MG tablet  Self No No   Sig: Take 1 tablet (325 mg) by mouth every 4 hours as needed for mild pain or fever   acetylcysteine (MUCOMYST) 10 % nebulizer solution   No No   Sig: Inhale 4 mLs into the lungs 3 times daily as needed for mucolysis/respiratory distress   albuterol (PROVENTIL) (2.5 MG/3ML) 0.083% neb solution   No No   Sig: Take 1 vial (2.5 mg) by nebulization every 12 hours   amoxicillin (AMOXIL) 875 MG tablet   No No   Sig: Take 1 tablet (875 mg) by mouth 2 times daily   azithromycin (ZITHROMAX) 250 MG tablet   No No   Sig: Take 1 tablet (250 mg) by mouth daily   benzonatate (TESSALON) 100 MG capsule   No No   Sig: Take 1 capsule (100 mg) by mouth every 4 hours as needed for cough   Patient taking differently: Take 100 mg by mouth Monday, Wednesday, Friday.   cholecalciferol 50 MCG (2000 UT) CAPS   Yes No   Sig: Take 1 capsule by mouth daily On dialysis days (MWF)   fluticasone-salmeterol (ADVAIR) 250-50 MCG/ACT inhaler   No No   Sig: Inhale 1 puff into the lungs every 12 hours   magnesium oxide 400 MG CAPS   Yes No   Sig: Take 400 mg by mouth Tuesday, Thursday, Saturday, Sunday   metoprolol tartrate (LOPRESSOR) 25 MG tablet   No No   Sig: Take 1 tablet (25 mg) by mouth 2 times daily   montelukast (SINGULAIR) 10 MG tablet   No No   Sig: Take 1 tablet (10 mg) by mouth At Bedtime   multivitamin RENAL (RENAVITE RX/NEPHROVITE) 1 tablet tablet   Yes No   Sig: Take 1 tablet by mouth Tuesday, Thursday, Saturday, Sunday in evening.   pantoprazole (PROTONIX) 40 MG EC tablet   No No   Sig: Take 1 tablet (40 mg) by mouth every morning (before breakfast)   posaconazole (NOXAFIL) 100 MG EC tablet   No No   Sig: Take 3 tablets (300 mg) by mouth daily   Patient taking differently: Take 300 mg by mouth daily At noon   predniSONE (DELTASONE) 5 MG tablet   No No   Sig: Take 1 tablet (5 mg) by mouth daily   sulfamethoxazole-trimethoprim  (BACTRIM) 400-80 MG tablet   No No   Sig: Take 1 tablet by mouth three times a week   Patient taking differently: Take 1 tablet by mouth three times a week MWF   tacrolimus (GENERIC) 1 mg/mL suspension   No No   Sig: Take 0.6 mLs (0.6 mg) by mouth every morning AND 0.7 mLs (0.7 mg) every evening.      Facility-Administered Medications: None          Physical Exam   Vital Signs:     BP: (!) 215/134 Pulse: 108   Resp: (!) 38 SpO2: 97 % O2 Device: Mechanical Ventilator    Weight: 0 lbs 0 oz    Physical Exam:  General:  severe respiratory distress, cachectic, temporal muscle wasting   Eyes:  Sclera non icteric, normal conjuctiva  Neck :  Supple, trach in place  Lungs:  severe rales and rhonchi of RUL, rhonchi improved after suctioning of L lung field   CVS:  S1 and S2, tachycardic up to 133  Abdomen:  Soft, nontender, PEG in place  Musculoskeletal:  No pain or swelling.  No edema  Neuro:  Alert.   No acute deficit    Medical Decision Making       78 MINUTES SPENT BY ME on the date of service doing chart review, history, exam, documentation & further activities per the note.      Data   Imaging results reviewed over the past 24 hrs:   No results found for this or any previous visit (from the past 24 hour(s)).  Most Recent 3 CBC's:  Recent Labs   Lab Test 07/18/24  0657 07/17/24  0607 07/16/24  0547   WBC 2.5* 2.4* 2.7*   HGB 8.1* 8.0* 7.9*   * 104* 104*    153 167     Most Recent 3 BMP's:  Recent Labs   Lab Test 07/18/24  1248 07/18/24  1117 07/18/24  0831 07/18/24  0657 07/17/24  0744 07/17/24  0607 07/16/24  0831 07/16/24  0547   NA  --   --   --  132*  --  133*  --  131*   POTASSIUM  --   --   --  3.4  --  3.2*  --  3.1*   CHLORIDE  --   --   --  92*  --  93*  --  92*   CO2  --   --   --  29  --  30*  --  24   BUN  --   --   --  63.3*  --  39.8*  --  86.1*   CR  --   --   --  2.42*  --  1.65*  --  2.84*   ANIONGAP  --   --   --  11  --  10  --  15   CHELSEY  --   --   --  8.1*  --  8.2*  --  7.9*   *  170* 148* 128*   < > 150*   < > 166*    < > = values in this interval not displayed.     Most Recent 2 LFT's:  Recent Labs   Lab Test 07/11/24  0518 07/02/24  0359   AST 30 26   ALT 13 37   ALKPHOS 282* 176*   BILITOTAL 0.4 0.6     Most Recent 3 INR's:  Recent Labs   Lab Test 06/18/24  1418 03/05/24  0925 02/14/24  1050   INR 0.98 0.98 0.96

## 2024-07-18 NOTE — PROGRESS NOTES
HEMODIALYSIS TREATMENT NOTE    Date: 7/18/2024  Time: 12:24 PM     Data:  Pre Wt:   52.9 kg (bed scale)  Desired Wt:   To be established  Post Wt:  50.7 kg (bed scale)  Weight change: - 2.2 kg  Ultrafiltration - Post Run Net Total Removed (mL):  2000 ml  Vascular Access Status: Fistula patent  Dialyzer Rinse:  Light  Total Blood Volume Processed: 68.3 L   Total Dialysis (Treatment) Time:   3.0 Hrs  Dialysate Bath: K 4, Ca 3  Heparin: Heparin 500 units loading + 500 units/hr     Lab:   No  HbsAg - 7/4/2024 (Non Reactive)  HbsAb - 7/4/2024 <3.50 (Susceptible)      Interventions:  Dialysis done through Left upper arm AV Fistula using 15 gauge needles. ,   UF set to 2.5 Liters, accommodating priming and rinse back volumes  Lidocaine 1% ID administered prior to access site cannulation  Epoetin Aldo 4000 IV administered per MAR  See Flowsheet for Crit Profile throughout the run  Glucose checks done. Pre-HD: 148 mg/dl; Post-HD: 170 mg/dl  Treatment has ended safely and blood is rinsed back completely  Decannulation done post HD, hemostasis is achieved in 15 minutes  Pressure dressing is applied, to be removed after 4-6 hours  Post Tx assessment done. Patient remained in her room in stable condition  Report given to Sarah BECKHAM RN     Assessment:  A/O x 4, calm and cooperative, denies pain  Left upper arm AV Fistula has good thrill and bruit                Plan:    Per Renal team

## 2024-07-18 NOTE — PLAN OF CARE
Occupational Therapy Discharge Summary    Reason for therapy discharge:    Discharged to LTACH    Progress towards therapy goal(s). See goals on Care Plan in [facility] electronic health record for goal details.  Goals partially met.  Barriers to achieving goals:   discharge from facility.    Therapy recommendation(s):    Continued therapy is recommended.  Rationale/Recommendations:  Recommend continued skilled OT services to progress safety and IND w/ ADLs.

## 2024-07-18 NOTE — DISCHARGE SUMMARY
LTACH  Hospitalist Discharge Summary      Date of Admission:  7/18/2024  Date of Discharge:  No discharge date for patient encounter.  Discharging Provider: Dulce Hardy MD  Discharge Service: Hospitalist Service    Discharge Diagnoses   As per hospital course        Unresulted Labs Ordered in the Past 30 Days of this Admission       Date and Time Order Name Status Description    7/9/2024  8:19 AM Morphology Tracking In process     7/3/2024 11:30 AM Acid-Fast Bacilli Culture and Stain with AFB Stain In process     7/3/2024 11:24 AM Fungal or Yeast Culture Routine Preliminary     6/21/2024  5:04 PM Fungus Culture, non-blood - BAL Site 1 Preliminary     6/21/2024  5:04 PM Acid-Fast Bacilli Culture and Stain In process     6/19/2024  1:19 PM Acid-Fast Bacilli Culture and Stain In process         These results will be followed up by [facility]     Discharge Disposition   Transferred to [facility]  Condition at discharge: Critical    Hospital Course   Patient arrived on LTACH unit in severe respiratory distress.  She complains of nausea, chest pressure, and dsypnea. She is tachypneic and breathing at 41 breaths per minute. She is tripoding and pulse ox reading in low 80's.  She has severe rhonchi and rales in all lung fields, worst in RUL.  She was suctioned twice and minimal secretions noted and was given xopenex.  Her rhochi and rales of L lung field improved, but not the RUL.  She is having severe anxiety and was given 0.5 mg of ativan IVP.  She is complaining of chest pressure and tachycardic to 133.  EKG done and revealed an incomplete right bundle branch block, no ST-T wave changes in contiguous leads.  BP is 215/91- given 10mg of IV hydralazine, BP decreased to 185/99.  Spoke with Dr. Sanches from [facility] and explained that she is unstable and not safe here at [facility] and will be transferred back to [facility] via EMS.        #Acute on chronic hypoxic hypercapnic respiratory failure, improving  #Concern for possible  immune vs inflammatory process, improving  #HAP, Stenotrophomonas maltophilia, Enterococcus faecalis, and Candida guilliermondi complex  #Severe pulmonary edema  #Acute respiratory distress syndrome, resolved  Presented to the ED on 6/18/2024 with acute on chronic hypoxic hypercapnic respiratory failure. Transferred to MICU and intubated on overnight on 6/18. Workup significant for Stenotrophomonas, Enterococcus, and Candida pneumonia. Course was c/b progression to ARDS (6/21-22). CXR (6/26) with similar pulmonary opacities compared to 6/24, left > right.  The etiology of this groundglass opacity was unclear but ddx includes rejection vs. infection vs. volume overload. Tracheostomy placed on 7/3 due to inability to wean from ventilator. CT chest w/o contrast 7/8 demonstrated waxing and waning mostly improved right lung consolidation now with diffuse groundglass organizing opacities mostly in the lower lobe an middle lobe more than the upper lobe. Slight improvement in the peribronchiolar consolidation in the left lower lobe but there is increased groundglass organization in the left upper lobe. CT on 7/17 demonstrated improvement in bibasilar predominant GGO.  - Pulmonary toilet  - Mucomyst BID  - Hypertonic saline BID, alternate with mucomyst  - Albuterol neb QID  - Antibiotics as below  - Volume management with iHD  - Continue Daily SBT (AM and PM) on pressure support 10/5  - Pulm Transplant following while inpatient              - Plan for transplant pulmonology follow-up in 6-8 weeks     Vent Mode: CMV/AC  (Continuous Mandatory Ventilation/ Assist Control)  FiO2 (%): 30 %  Resp Rate (Set): 18 breaths/min  Tidal Volume (Set, mL): 320 mL  PEEP (cm H2O): 5 cmH2O  Pressure Support (cm H2O): 10 cmH2O  Resp: 25    #Recurrent right middle bronchus stenosis s/p dilation and stent (6/20/2024), stable  Has history (bronchoscopy 2/15/24) demonstrating narrowing of right mainstem transplant anastomosis and bronchus  intermedius. CT chest (6/18/2024) and bronchoscopy (6/19/2024) demonstrated occlusion of right middle bronchus. With concern for post-obstructive pneumonia, interventional pulmonology was consulted, and completed bronchoscopy (6/20/2024) with tissue debulking, right middle bronchus balloon dilation to 11 mm, stent placement in right main bronchus, and bifurcating cast placement in right upper bronchus. IP pulm re-evaluated bronchial stents with BAL and noted that they were open.    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   - Prospera (7/3) 2.26  - DSA (7/3) negative  - PTA nebs  - Fluticasone-viilanterol (breo ellipta) every day - HOLD with respiratory infection  - Montelukast every day   - Immunosuppressants per Tx Pulm:   - PTA Tacrolimus 1.5mg BID  - PTA Prednisone 5 mg daily   - PTA azathioprine - HOLD per Transplant pulmonology with repeat infections  - Peripheral smear (7/9) pending for pancytopenia  - Antibiotic prophylaxis               - Bactrim MWF for PJP ppx (monitor need to adjust pending HD plan)  - CMV weekly monitoring (qTuesday)  - Azithromycin 125mg per pulmonary, continue to monitor QTc  - Continue VGCV ppx (renally dosed, monitor need to adjust pending HD plan) with tentative plan for 3 weeks beyond completion of stress dose steroids   - Steroid Taper per Transplant Pulmonology as below:  Date Daily dose (mg)   7/11 50   7/25 45   8/8 40   8/22 35   9/5 30   9/19 25   10/17 20   11/14 15   12/12 10   1/9 5      - Hold PTA prednisone dose while completing steroid taper as above    #HAP, Stenotrophomonas maltophilia, Enterococcus faecalis, Aspergillus, and Candida guilliermondi complex  Presented to the ED on 6/18/2024  with SOB and hypercapnic respiratory failure. Found on bronchoscopy (6/19) to have RML obstruction by narrowing bronchus intermedius as well as copious mucopurulent secretions/mucus plugging. Previously treated for Stenotrophonomas/aspergillus pneumonia in 11/2023 with subsequent sputum culture (2/2024) negative for both Stenotrophonomas/Aspergillus. Etiology likely repeat Stenotrophomonas infection vs opportunistic infection from colonized microbe.   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                - Continue posaconazole 300 mg/day                - Complete 14 days of micafungin 150 mg/day today (6/25-7/9)- discontinued   - Continue VGCV prophylaxis  - Weekly CMV VL (Tuesdays, next 7/9), last was 39 (7/2)         - Azithromycin per pulmonary (holding due to QTC, but will follow up)  - TMP/SMX SS daily for PJP PPx (for life given h/o PJP)  - Awaiting susceptibilities on Aspergillus per transplant ID  - Present antibiotics               - Continue posaconazole 300 mg/day per pulm transplant (6/25 to present) for candida guillermondii and asergillus ochraceus on prior BAL  - Past antibiotics              - s/p micafungin 150 mg/day (6/25-7/9)  - s/p IV minocycline 100mg BD x 14 days total (6/20-7/5)- for stenotrophamonas  - PO linezolid 600 mg BID x 10 days (6/25-7/5)- for VRE in urine and BAL cultures  - S/p ceftazidime (6/25-6/28)               - S/p vancomycin (6/18-6/20)              - S/p zosyn (6/18-21)  - S/p  Daptomycin (6/21-6/23)  - S/p Meropenem (6/21-6/24)  - S/p Levofloxacin (6/21-6/23)     - BAL fluid (7/3): pink, hazy, cell counts normal range, fluid sent for viral, bacterial and fungal cultures negative to date, cytology negative for malignancy and organisms; preliminary AFB negative    #Hx Aspergillus PNA (11/2023)  #Hx PJP PNA (1/2021)  #Hx CMV viremia, recurrent  #Hx EBV viremia  - Transplant ID consult, appreciate recs  PTA posaconazole (Treatment for hx of aspergillus PNA)  PTA azithromycin 250mg qd (CLAD ppx) (holding)  PTA bactrim (PJP ppx)  -CMV PCR 7/16 positive but <35    #ESRD on iHD (M,W,F)  History of ESRD 2/2 Tacrolimus toxicity on HD (MWF). With soft blood pressures, placed RIJ (6/20/2024) and started CRRT (6/20/2024). US of AVF 7/5: arterial inflow patent without inflow stenosis, normal diameter of anastamosis, venous outflow elevated velocity at subclavian- suggestive of recurrent stenosis in venous outflow (area of prior angioplasty in March). IR consulted who noted that IR fistulogram not required at that time, and was able to run iHD on beginning on 7/6 without issue.   - Nephrology consulted and following for ESRD, patient tolerating 3x weekly HD at time of discharge  - Renally-dosed medications  - BMP daily  - HD T/ Th/ Sa schedule now, may need to transition back to M/W/F following discharge from LTACH  - Replete K 3.2 with 20mEq KCL po     #Anxiety, improving  Patient has been having anxiety regarding pressure support trials and weaning from the ventilator. Health Psychology consulted and followed patient weekly while hospitalized.     #Toxic metabolic encephalopathy, resolved  Patient noted to have lethargy in setting of sepsis and delirium in the setting of high-dose steroid therapy which improved with management of underlying sepsis. Patient mental status returned to baseline at day of discharge.   - Quetiapine to 25 mg at bedtime   - Ramalteon at bedtime  - Trazodone 25mg for sleep  -  Delirium precautions  - Do not disturb order overnight to promote sleep     #Septic shock, resolved  On 6/19/2024, developed sepsis in the setting of stenotrophomonas pneumonia/enterococcus faecium VRE UTI. Etiology of shock likely distributive iso sepsis; less likely hypovolemic (not pulling volumes with CRRT), medication-associated (weaned off of propofol gtt) or obstructive (low suspicion for PE, echo 6/19 without evidence of cardiac dysfunction). Occasional episodes of hypotension overnight, but self-resolving and have not occurred in a couple days. Related to HAP, resolved with treatment of infection as below.   - s/p SDS & pressors and midodrine, off of all support agents  - MAPs 70s-80s, stable     #Demand Ischemia, resolved    Presented with elevated troponin (peak 499). Not associated with chest pain or hypoxia. EKG without ST elevations/depressions or T wave inversions. Suspect demand ischemia in the setting of sepsis. Will continue to monitor symptoms and continuous telemetry. EKG 7/2 sinus rhythm with PACs.     #Paroxysmal A-Fib, improved  #Pulmonary Hypertension   #Transient arhythmia with palpitations  History of pulmonary hypertension (45 mmHg, echo 10/2023) in the setting of ILD and paroxysmal afib on PTA metoprolol tartrate BID. Not on anticoagulation for afib. Transient unspecified arhythmia overnight (7/9) with palpitations. EKG 7/9 sinus rhythm with fusion complexes and known RBBB. Repeat EKG 7/10 showing sinus rhythm with no RBBB or fusion complexes. Patient given magnesium 1g and 20mEq K+ for K 3.3 this am. No further episodes of palpitations. Currently in sinus rhythm.  - continue PTA metoprolol tartrate 25mg BID  - CTM     #Prolonged Qtc, resolved  Repeat EKG 7/13 with QTC of 483.   - Azithromycin ppx restarted per pulm transplant  -Qtc 476 (7/17)  -Daily EKG       Severe protein calorie deficiency  PEG-J placed by IR on 7/5, continue tube feeds.   - Nutrition consulted  - Tube feeds 30ml/hr;  at goal     #Diarrhea, resolved  On 6/21, had 8 bowel movements. Occurred in the setting of scheduled bowel regimen and starting new antibiotics (meropenem, levofloxacin). C diff negative.   - Holding PRN bowel regimen for loose stools  - Miralax PRN   - Senna prn  - formula changes made per nutrition      #Cholelithiasis with gallbladder wall thickening, resolved  During admission patient found to have up trending LFTs and fever in setting of GB wall thickening on CT abdomen/pelvis, now resolved. MRCP completed showing borderline dilated CBD up to 1.1 cm, no significant intrahepatic biliary dilation, no evidence of choledocholithiasis, no evidence of acute cholecystitis.          #Risk for hyperglycemia with high dose steroids  Stress dose steroids discontinued, resume PTA prednisone 5 mg daily. Blood sugars have remained in appropriate range.  - NPH 8U BID  - CTM        #Pyuria, Enterococcus faecium VRE and  ESBL E. Coli   Asymptomatic. With progressive IV pressor needs, obtained broad infectious workup which demonstrated UCx (6/19/2024) with Enterococcus faecium VR and ESBL E. Coli. S/p Daptomycin (6/21-6/23) and Meropenem (6/21-6/24).       #Acute on Chronic Normocytic Anemia, stable  #Thrombocytopenia, chronic, improving  History of chronic anemia in the setting of kidney disease (baseline Hgb 10-11). Upon admission, Hgb 9. May be bone marrow suppression in the setting of chronic illness; potential contribution by blood loss with CRRT filter changes (~150-200c). Peripheral smear (6/18/2024) without evidence of schistocytes.    Consultations This Hospital Stay   PULMONARY IP CONSULT  THERAPEUTIC RECREATION EVAL & TREAT  NUTRITION SERVICES ADULT IP CONSULT  OCCUPATIONAL THERAPY ADULT IP CONSULT  PHYSICAL THERAPY ADULT IP CONSULT  RESPIRATORY CARE IP CONSULT  SPEECH LANGUAGE PATH ADULT IP CONSULT  NUTRITION SERVICES ADULT IP CONSULT  WOUND OSTOMY CONTINENCE NURSE  IP CONSULT  PHARMACY IP CONSULT - LTACH  IMMUNOSUPPRESSANT    Code Status   Full Code    Time Spent on this Encounter   I, Dulce Hardy MD, personally saw the patient today and spent greater than 30 minutes discharging this patient.       Dulce Hardy MD  [facility] [address] [place] [address]  Phone: [phone]  Fax: [phone]  ______________________________________________________________________    Physical Exam   Vital Signs: Temp: 98.1  F (36.7  C) Temp src: Oral BP: (!) 185/99 Pulse: (!) 133   Resp: (!) 38 SpO2: 100 % O2 Device: Mechanical Ventilator    Weight: 0 lbs 0 oz  General:  severe respiratory distress, cachectic, temporal muscle wasting   Eyes:  Sclera non icteric, normal conjuctiva  Neck :  Supple, trach in place  Lungs:  severe rales and rhonchi of RUL, rhonchi improved after suctioning of L lung field   CVS:  S1 and S2, tachycardic up to 133  Abdomen:  Soft, nontender, PEG in place  Musculoskeletal:  No pain or swelling.  No edema  Neuro:  Alert.   No acute deficit       Primary Care Physician   Ame Chow    Discharge Orders   No discharge procedures on file.    Significant Results and Procedures   Most Recent 3 CBC's:  Recent Labs   Lab Test 07/18/24  1549 07/18/24  0657 07/17/24  0607 07/16/24  0547   WBC  --  2.5* 2.4* 2.7*   HGB 10.4* 8.1* 8.0* 7.9*   MCV  --  107* 104* 104*   PLT  --  152 153 167     Most Recent 3 BMP's:  Recent Labs   Lab Test 07/18/24  1549 07/18/24  1248 07/18/24  1117 07/18/24  0831 07/18/24  0657 07/17/24  0744 07/17/24  0607 07/16/24  0831 07/16/24  0547     --   --   --  132*  --  133*  --  131*   POTASSIUM 4.5  --   --   --  3.4  --  3.2*  --  3.1*   CHLORIDE  --   --   --   --  92*  --  93*  --  92*   CO2  --   --   --   --  29  --  30*  --  24   BUN  --   --   --   --  63.3*  --  39.8*  --  86.1*   CR  --   --   --   --  2.42*  --  1.65*  --  2.84*   ANIONGAP  --   --   --   --  11  --  10  --  15   CHELSEY  --   --   --   --  8.1*  --   8.2*  --  7.9*   * 194* 170*   < > 128*   < > 150*   < > 166*    < > = values in this interval not displayed.     Most Recent 2 LFT's:  Recent Labs   Lab Test 07/11/24  0518 07/02/24  0359   AST 30 26   ALT 13 37   ALKPHOS 282* 176*   BILITOTAL 0.4 0.6     Most Recent 3 INR's:  Recent Labs   Lab Test 06/18/24  1418 03/05/24  0925 02/14/24  1050   INR 0.98 0.98 0.96   ,   Results for orders placed or performed during the hospital encounter of 06/18/24   CT Chest Hi-Resolution wo Contrast    Narrative    High-resolution chest CT without contrast    INDICATION: Worsening shortness of breath. Post lung transplant with  stent.    COMPARISON: Similar examination to/14/24    FINDINGS: No contrast. Inspiration and expiration supine imaging  obtained. Included thyroid appears unremarkable. Median sternotomy  from prior bilateral lung transplantation. Heart size is borderline  enlarged. Left atrium is enlarged. There are mitral annular  calcifications which appear to correlate with a left atrial  enlargement, please also correlate for any evidence of cardiac  arrhythmia. There is no pericardial effusion. There are small  bilateral pleural effusions. Rim calcified density within the medial  aspect left pleural effusion is unchanged. No enlarged axillary lymph  nodes. No enlarged mediastinal or hilar lymph nodes. The thoracic  aorta does show some mild atherosclerotic calcification. It is  borderline enlarged in size with its midascending portion approximate  4.0 cm in caliber. The main pulmonary artery is also borderline  enlarged at 3.3 cm.  Small calcific densities in the nondistended gallbladder consistent  with gallstones. Parallel tram track calcifications of the tortuous  splenic artery are also noted, please correlate for any evidence of  chronic renal disease. No other suspicious upper abdominal finding.  Bone detail shows T5 osteoporotic compression deformity unchanged. The  sternal cerclage wires appear  grossly intact. No sternal erosions.    Detail of the lungs shows diffuse consolidative than some  nodular/patchy densities in the right upper and lower lobes as well as  the lingula and left lower lobe. Other subtle nodular densities  throughout the lung apices are less apparent than on the lower lobes.  Azygos fissure anatomical variant in the right upper lobe medially.  Comparison with the February examination shows the findings to show  more scattered opacities throughout the lower lobes especially on the  right.  There is narrowing of bronchus intermedius and also the right mainstem  bronchus distal to the mainstem anastomosis from transplant. There is  new occlusion of the right middle lobe bronchus from its takeoff  distally. Narrowing of the right lower lobe distal aspect of the lobar  level the bronchus with severe narrowings of the right posterior and  medial basilar segment airways.  Expiratory imaging shows mild air trapping.      Impression    IMPRESSION: Bilateral transplanted lungs. Slight decreased confluent  consolidative opacities in right lower lobe with more micronodular  component of possible infection in the right lower lobe. Other  abnormalities throughout the lungs similar to February of the  transplanted lungs. Pleural effusions again noted. Narrowing of the  bronchus intermedius on the right there appears to be occlusion of the  right middle lobe bronchus with narrowing of the distal aspect of the  right lower lobe bronchus with short segment occlusions of the medial  and posterior basilar bronchial segments to the right lower lobe. No  such abnormality on the left.. No ectopic air. Anastomoses appear  grossly intact bilaterally with just under approximate 50% narrowing  distal to the right mainstem anastomosis proximal to the upper lobe  bronchus takeoff. This right middle lobe bronchial occlusion is new  from February.    Mitral annular calcifications with left atrial prominence,  please  correlate for arrhythmia.  Borderline pulmonary enlargement concerning for possible pulmonary  hypertension.  Borderline mid ascending aortic ectasia.    TERRI ISSA MD         SYSTEM ID:  [id number]   XR Chest Port 1 View    Narrative    XR CHEST PORT 1 VIEW  6/18/2024 10:57 PM     HISTORY:  s/p intubation       COMPARISON:  12/19/2023, same day CT chest    TECHNIQUE: Portable, semiupright, frontal projection radiograph of the  chest.    FINDINGS:   ET tube tip projects 1.6 cm above the gee.    Slight rightward deviation of the trachea. Normal-sized heart with  silhouetting of the heart borders. Right-sided meniscus sign.  Bilateral perihilar and bibasilar patchy opacities silhouetting the  diaphragms. No pneumothorax.        Impression    IMPRESSION:  ET tube tip projects 1.6 cm above the gee. Small bilateral pleural  effusions with bibasilar, right greater than left airspace opacities  suspicious for infection versus atelectasis.    I have personally reviewed the examination and initial interpretation  and I agree with the findings.    JAMES LAI MD         SYSTEM ID:  [id number]   XR Abdomen Port 1 View    Narrative    EXAMINATION:  XR ABDOMEN PORT 1 VIEW 6/18/2024     COMPARISON: 10/30/2023    HISTORY: OG position.    TECHNIQUE: One frontal supine view of the abdomen.    FINDINGS: Gastric tube tip and sidehole project over the stomach. No  abnormally dilated air-filled loops of bowel. Median sternotomy wires.  Bibasilar pulmonary opacities, greater on the right.      Impression    IMPRESSION:   Gastric tube tip and sidehole are within the stomach.    I have personally reviewed the examination and initial interpretation  and I agree with the findings.    JAMES LAI MD         SYSTEM ID:  [id number]   XR Abdomen Port 1 View    Narrative    EXAMINATION: US ABDOMEN COMPLETE,  6/19/2024 12:06 PM     COMPARISON: 6/18/2024     HISTORY: Verify small bowel feeding tube bedside  placement    FINDINGS: Feeding tube tip in the inferior duodenum. No air-filled  dilated loops of bowel. Basilar atelectasis and consolidation in the  lungs.      Impression    IMPRESSION: Feeding tube in the third portion of the duodenum.     JAMES LAI MD         SYSTEM ID:  [id number]   XR Chest Port 1 View    Narrative    Exam: XR CHEST PORT 1 VIEW, 6/20/2024 6:49 AM    Comparison: 6/18/2024    History: s/p BSLT, AHRF in setting of RLL pneumonia    Findings:  Portable AP view of the chest. Endotracheal tube in the thoracic  trachea 2.3 cm above the gee. Partially imaged enteric tube.  Cardiomegaly with bilateral  basilar predominant opacities, mildly  increased in the upper lobes. No pneumothorax. Unchanged sternotomy  wires and surgical clips.      Impression    Impression:   Bilateral pulmonary opacities, mildly increased in the upper lobes  suggesting increased edema and underlying infection.   The endotracheal tube projects 2.3 cm above the gee.    I have personally reviewed the examination and initial interpretation  and I agree with the findings.    REJI VIVAS DO         SYSTEM ID:  [id number]   XR Chest Port 1 View    Narrative    EXAM: XR CHEST PORT 1 VIEW 6/20/2024 10:51 AM    INDICATION: CVC placement    COMPARISON: Same-day chest radiograph 06:21    TECHNIQUE: Single portable semiupright AP view of the chest.    FINDINGS:   Interval placement of right IJ central venous catheter with tip  projecting over the cavoatrial junction. Endotracheal tube tip 3.3 cm  above the gee. Enteric tube coursing below left hemidiaphragm.  Right upper extremity midline catheter tip projecting over the axilla.  Postsurgical changes of bilateral lung transplant with intact  sternotomy wires and mediastinal clips. Similar diaphragm silhouetting  and slightly improved patchy bibasilar and perihilar opacities. No  evidence of pneumothorax.      Impression    IMPRESSION:   1. Interval placement of right IJ  central venous catheter with tip  projecting over the cavoatrial junction. Slight retraction of the ET  tube to 3.3 cm above the gee, otherwise stable support devices.  2. Stable bilateral pleural effusions with slightly improved bibasilar  atelectasis versus improved patient positioning. Persistent perihilar  patchy opacities.    I have personally reviewed the examination and initial interpretation  and I agree with the findings.    ADAM GONZALEZ MD         SYSTEM ID:  [id number]   XR Surgery SHANNAN L/T 5 Min Fluoro w Stills    Narrative    This exam was marked as non-reportable because it will not be read by a   radiologist or a [place] non-radiologist provider.         XR Chest Port 1 View    Narrative    EXAM: XR CHEST PORT 1 VIEW 6/20/2024 2:35 PM    INDICATION: airway stenting    COMPARISON: Same day chest radiograph 10:42    TECHNIQUE: Single portable semiupright AP view of the chest.    FINDINGS:   Interval placement of right main stem bronchial stent. Other unchanged  support devices including right upper shotty midline tip in the  axilla, right IJ central venous catheter at the cavoatrial junction,  endotracheal tube 2.6 cm above the gee, enteric tube coursing below  left hemidiaphragm.    Postsurgical changes of bilateral lung transplant with midline  sternotomy wires intact. Unchanged mixed interstitial and airspace  opacities, slightly increased compared to prior. Stable cardiomegaly.  No pneumothorax.      Impression    IMPRESSION:   Interval placement of right mainstem bronchial stent, otherwise  unchanged support devices. Slightly increased mixed airspace and  interstitial opacities suggesting possible underlying infection with  concomitant pulmonary edema.    I have personally reviewed the examination and initial interpretation  and I agree with the findings.    ADAM GONZALEZ MD         SYSTEM ID:  [id number]   XR Chest Port 1 View    Narrative    Exam: Chest 1 view portable semiupright 6/21/2024  4:24 PM     History:  hypoxia    Comparison: 6/20/2024    Findings: Endotracheal tube, median sternotomy, feeding tube, right IJ  central line, right bronchial stents again noted. Shifting  interstitial and airspace opacities bilaterally increased on the left,  decreased on the right.. Cardiac silhouette is nonenlarged. Pleural  calcifications are noted on the left.      Impression    IMPRESSION: Shifting interstitial and airspace opacities greater on  the left possibly edema or infection.    ADAM GONZALEZ MD         SYSTEM ID:  [id number]   XR Chest Port 1 View    Narrative    XR CHEST PORT 1 VIEW  6/21/2024 9:47 PM     HISTORY:  increasing FiO2 needs       COMPARISON:  5 hours prior    TECHNIQUE: Portable, semiupright, frontal projection radiograph of the  chest.    FINDINGS:   Stable position of right-sided midline, right IJ central venous  catheter, ET tube, feeding tube.    Patient is significantly rightward rotated limiting evaluation and  comparison. Silhouetting of the heart borders. Silhouetting of the  diaphragms. Continued left-sided hazy and patchy opacities. Continued  right-sided hazy opacities in the right lung base and adjacent to the  major fissure. No appreciable pneumothorax.        Impression    IMPRESSION:  Stable exam with unchanged support devices and unchanged left greater  than right mixed interstitial and airspace opacities likely  representing pulmonary edema/infection.    I have personally reviewed the examination and initial interpretation  and I agree with the findings.    EDIE VILLA MD         SYSTEM ID:  [id number]   XR Chest Port 1 View    Narrative    Exam: XR CHEST PORT 1 VIEW, 6/22/2024 8:14 AM    Indication: ILD s/p lung transplant, stenotrophomonas PNA; increasing  pressor needs; eval changes in opacities/effusions    Comparison: 6/21/2024    Findings:   Portable AP radiograph of the chest. Endotracheal tube tip terminates  approximately 3 cm above the gee. Right IJ  central venous catheter  tip is in the mid SVC. Feeding tube tip terminates within the fourth  portion of the duodenum. Postoperative changes along transplant.  Mediastinal clips and right axillary clips project similarly. Right  main stem bronchus stent.    Cardiac silhouette is obscured by pulmonary opacities. Low lung  volumes diffuse hazy and patchy pulmonary opacities greatest within  the left lung and lung bases bilaterally. No pneumothorax. Probable  small left effusion however, the left costophrenic angle is clipped.  The upper abdomen is unremarkable. No acute osseous abnormality.  Increased right effusion and right lower zone opacity      Impression    Impression:     1. Mid zone predominant bilateral, left greater than right,  interstitial and airspace opacities that likely represent pulmonary  edema or infection is stable to minimally increased   2. support devices project in a similar position. Endotracheal tube is  3 cm above the gee.  3. Increased small right effusion and right lower zone opacities    I have personally reviewed the examination and initial interpretation  and I agree with the findings.    EDIE VILLA MD         SYSTEM ID:  [id number]   XR Chest Port 1 View    Narrative    Exam: XR CHEST PORT 1 VIEW, 6/23/2024 1:58 AM    Comparison: 6/22/2024    History: worsening oxygenation    Findings:  Portable AP view of the chest. Median sternotomy wires. Stable support  devices. Right bronchial stent. Increased opacification of the left  hemithorax. Right basilar patchy mixed opacities. No pneumothorax.       Impression    Impression:   1. Mildly increased opacification of the left hemithorax, suggestive  of diffuse pulmonary edema versus infection. Stable right basilar  mixed opacities.   2. Stable support devices.    I have personally reviewed the examination and initial interpretation  and I agree with the findings.    CARMELINA VILLALOBOS MD         SYSTEM ID:  [id number]   XR Chest Port 1  View    Narrative    Exam: TEMPORARY, 6/24/2024 6:25 AM    Indication: Sepsis    Comparison: 6/23/2074    Findings:   Single frontal view of the chest. Endotracheal tube terminates in the  mid thoracic trachea. Right IJ central venous catheter tip is grossly  stable. Enteric tube courses inferiorly below the diaphragm with tip  out of the field of view. Right bronchial stent. Trachea is midline.  Cardiac silhouette is grossly stable. Improved aeration in the left  lung with persistent opacities throughout the left lung and right lung  base. Probable small bilateral pleural effusions.      Impression    Impression:   1. Overall improved aeration in the left lung with persistent  opacities in the left lung and right lung base, likely improving  pulmonary edema or infection.  2. Stable support devices.    I have personally reviewed the examination and initial interpretation  and I agree with the findings.    CARMELINA VILLALOBOS MD         SYSTEM ID:  [id number]   XR Chest Port 1 View    Narrative    Exam: XR CHEST PORT 1 VIEW, 6/26/2024 10:07 AM    Indication: hypoxia    Comparison: Chest x-ray 6/24/2024    Findings:     Portable frontal projection chest radiograph. Similar postsurgical  changes and support devices including sternotomy wires, right  bronchial stent, endotracheal tube, right IJ catheter, right midline  catheter and partially visualized enteric tube. Similar pulmonary  opacities, left more than right, predominantly mid and lower zone and  cardiac silhouette. No acute airspace opacity. No discernible  pneumothorax. Possible trace effusions. Low lung volumes      Impression    Impression: Stable support devices. Similar pulmonary opacities, left  more than right, compared to prior radiograph from 6/24/2024,  representing edema/infection with atelectasis.    EDIE VILLA MD         SYSTEM ID:  [id number]   US Abdomen Limited    Narrative    EXAMINATION: US ABDOMEN LIMITED 6/28/2024 9:17 AM     HISTORY: RUQ U/S  for c/f cholecystitis with rising LFTs and bili, with  elevated body temps and tenderness to the RUQ.       TECHNIQUE: The abdomen was scanned in standard fashion with  specialized ultrasound transducer(s) using both gray-scale, color  Doppler, and spectral flow techniques.    COMPARISON: Radiograph 6/19/2024, CT 10/28/2023    FINDINGS:     Fluid: Mild ascites. Right subdiaphragmatic hypoechogenicity.    Liver: The liver demonstrates mild coarsened echotexture, measuring  17.8 cm in craniocaudal dimension. There is no focal mass.     Gallbladder: Gallbladder wall thickening measuring up to 6 mm.  Shadowing gallstones are present. Pericholecystic fluid. Unable to  assess sonographic Swanson's sign as the patient is intubated.    Bile Ducts: The common bile duct is dilated and measures up to 11 mm  in diameter.    Pancreas: Partially obscured. Visualized portions of the head and body  of the pancreas are unremarkable.     Kidney: The right kidney measures 7.9 cm long. 0.9 cm benign renal  cyst. No hydronephrosis.        Impression    IMPRESSION:     1. Cholelithiasis with gallbladder wall thickening, mild  ascites/pericholecystic fluid. Swanson's sign could not be elicited,   but the gallbladder is not distended, possibility of cholecystitis  cannot be ruled out.  2. Visualized common bile duct is dilated measuring 11 mm compared to  7 mm on ultrasound from 11/2/2023, distal common bile duct is not  visualized,cannot entirely exclude choledocholithiasis/distal biliary  obstruction.  3. Trace ascites.  4. Mild hepatomegaly with heterogenous liver parenchyma which may be  seen with parenchymal liver disease.  5. Right kidney is small in size.  6. Right supradiaphragmatic hypoechogenicity possibly  consolidated/atelectatic lung    Finding of visualized dilated common bile duct discussed with ordering  provider.      I have personally reviewed the examination and initial interpretation  and I agree with the  findings.    EDIE VILLA MD         SYSTEM ID:  [id number]   MR Abdomen MRCP w/o & w Contrast    Narrative    MRCP Without and With Contrast    CLINICAL HISTORY: CBD Dilation to 11mm    DATE: 6/29/2024 1:20 PM    TECHNIQUE:     Images were acquired with and without intravenous gadolinium contrast  through the upper abdomen. The following MR images were acquired  without intravenous contrast: TrueFISP, multiplanar T2-weighted, axial  T1 in/out of phase, T2-weighted MRCP images, axial diffusion-weighted  and axial apparent diffusion coefficient. T1-weighted images were  obtained before contrast at the multiple time points following  contrast injection. 3-D reformatted images were generated by the  technologist. Contrast dose: 5.5mL Gadavist    Comparison study: Ultrasound, 6/28/2024; CT, 10/28/2023    FINDINGS:    Biliary Tree: The common bile duct is dilated measuring up to 11 mm,  similar to the prior ultrasound. No significant intrahepatic biliary  dilation. There is no abrupt caliber change along the course of common  bile duct but rather this tapers gently to the ampulla, series 4 image  12. No stones visualized in the common bile duct.    Pancreas: The pancreatic duct is not dilated.    Liver: No enhancing mass. No significant fatty infiltration. Liver is  very T2 hypointense with increased signal on out of phase imaging  consistent with iron deposition. Liver is enlarged at 19 cm  craniocaudad.    Gallbladder: Filling defects near the neck of the gallbladder,  corresponding to known radiopaque gallstones seen in prior CT. No  significant pericholecystic fluid. No significant enhancement of the  gallbladder wall.    Spleen: Spleen is T2 hypointense with increase in signal on out of  phase imaging consistent with iron deposition. Spleen is enlarged at  15.4 cm craniocaudad.    Kidneys: Multiple simple renal cyst.    Adrenal glands: Unremarkable.    Bowel: No bowel obstruction.    Lymph nodes: No significant  lymphadenopathy.    Blood vessels: No evidence of AAA.    Lung bases: Bilateral pleural effusions with adjacent  atelectasis/airspace disease. Partially visualized collection in the  left pleural space. Please see separate CT chest same date.    Bones and soft tissues: Susceptibility artifact from sternotomy wires  in the lower thorax noted.    Mesentery and abdominal wall: Body wall, retroperitoneal and  mesenteric edema.    Ascites: Small volume ascites.      Impression    IMPRESSION:   1. Borderline dilated common bile duct measuring up to 1.1 cm. No  significant intrahepatic biliary dilation. No evidence of  choledocholithiasis.  2. Cholelithiasis without definitive evidence of acute cholecystitis.  3. Overall third spacing of fluid demonstrated with small volume  ascites, bilateral pleural effusions, mesenteric, retroperitoneal and  body wall edema.  4. Iron deposition in the liver and spleen. Hepatosplenomegaly.    I have personally reviewed the examination and initial interpretation  and I agree with the findings.    NITHYA GUIDO MD         SYSTEM ID:  [id number]   CT Chest w/o Contrast    Narrative    Exam: Chest CT without contrast 6/29/2024 1:41 PM    History: Acute hypoxic respiratory failure.     Comparison: Chest radiograph 6/26/2024. Multiple prior chest CTs most  recent dated 6/18/2024.    Technique: Helical CT imaging of the chest was obtained without the  administration of intravenous contrast.    Findings:    Devices: Right internal jugular central venous catheter tip terminates  in the superior vena cava. Endotracheal tube tip terminates in the  midthoracic trachea. Feeding tube tip is postpyloric and terminates in  the proximal jejunum. Median sternotomy.    Chest:   Mediastinum: The thyroid, pulmonary trunk, thoracic aorta, heart, and  esophagus are normal. No thoracic adenopathy.     Lungs: Status post bilateral lung transplant. Accessory right upper  lobe. Bronchial stent in the right  mainstem bronchus and proximal  intermedius. Central trachea is clear. Small left and trace right  pleural effusions. No pneumothorax. Multifocal confluent groundglass  opacities throughout both lungs, with consolidative reticular  opacities in the lung bases. Suggestion of crazy paving in the lung  bases. Rim calcified pleural plaque in the posteromedial aspect of the  left lower lobe. Right hilar calcification.    There is no consolidation, ground glass opacity, pneumothorax, or  pleural effusion.     Upper abdomen: Calcified gallstones. Splenic artery calcifications.  Craniocaudal dimension of the spleen measures 14.7 cm. Trace ascites.    Bones/soft tissues: No acute fracture or dislocation. Chronic  compression type deformity of the T5 vertebral body. Midline  sternotomy.      Impression    Impression:   1. Multifocal panlobar opacities bilaterally; differential includes  multifocal infectious pneumonia (including atypicals), alveolar  pulmonary edema, recurrent interstitial lung disease, medication side  effect, and chronic lung allograft dysfunction (CLAD).    2. Small left and trace right pleural effusions.    3. Moderate splenomegaly.    4. Cholelithiasis.    5. Trace ascites.    I have personally reviewed the examination and initial interpretation  and I agree with the findings.    NITHYA GUIDO MD         SYSTEM ID:  [id number]   XR Chest Port 1 View    Narrative    EXAM: XR CHEST PORT 1 VIEW  7/2/2024 3:44 PM      HISTORY: Lung tx, intubated. CT on 6/29 showed GGO    COMPARISON: CT 6/29/2024, 6/26/2024 radiograph    FINDINGS: Single view of the chest. An endotracheal tube terminates in  the midthoracic trachea. Postoperative changes in the chest and intact  median sternotomy wires. Enteric tube terminates in the stomach and  duodenum. Right IJ central venous catheter tip terminates at the  superior cavoatrial junction. Right bronchial stent is unchanged.   Trachea is midline. Cardiac silhouette is  stable. Diffuse hazy opacity  throughout the left greater than right lungs, slightly improved  compared to 6/26/2024. No pleural effusion. No pneumothorax.      Impression    IMPRESSION:   1. Endotracheal tube terminates in the midthoracic trachea. Additional  support devices are stable.  2. Hazy opacities throughout the left greater the right lungs, overall  improved compared to radiograph dated 6/26/2024.    I have personally reviewed the examination and initial interpretation  and I agree with the findings.    TERRI ISSA MD         SYSTEM ID:  [id number]   IR Gastro Jejunostomy Tube Placement    Narrative    PROCEDURE: Gastrojejunostomy tube placement    Procedural Personnel  Attending physician(s): Edin Rodriguez who was present for the  entire procedure.  Fellow physician(s): Chrissy Lobo  Resident physician(s): None  Advanced practice provider(s): None    Pre-procedure diagnosis: Cystic fibrosis  Post-procedure diagnosis: Same  Indication: Nutritional support  Additional clinical history: None    Complications: No immediate complications.      Impression    IMPRESSION:  Percutaneous placement of 18 Nepalese push-type gastrojejunostomy tube.    Plan:   Nothing by mouth for 4 hours. Then, slow incremental increase in tube  feeds.  _______________________________________________________________    PROCEDURE SUMMARY:  -Gastrojejunostomy tube placement under fluoroscopic guidance  - Additional procedure(s): None    PROCEDURE DETAILS:    Pre-procedure  Consent: Informed consent for the procedure including risks, benefits  and alternatives was obtained and time-out was performed prior to the  procedure.  Preparation: The site was prepared and draped using maximal sterile  barrier technique including cutaneous antisepsis.    Anesthesia/sedation  Level of anesthesia/sedation: Moderate sedation (conscious sedation)  Anesthesia/sedation administered by: Independent trained observer  under attending supervision  with continuous monitoring of the  patient?s level of consciousness and physiologic status  Total intra-service sedation time (minutes): 40    Gastrojejunostomy tube placement  The liver margin was localized by ultrasound. An indwelling  orogastric/nasogastric tube was already in place. Local anesthesia was  administered. The stomach was inflated with air. 2 T-fasteners were  placed under fluoroscopic guidance. The stomach was accessed and a  wire was placed. A Kumpe catheter and a Glidewire were used to access  the proximal jejunum. The tract was dilated, the gastrojejunostomy  tube was advanced into the proximal jejunum, and postpyloric position  was confirmed with contrast injection. The tube was capped.  Gastrojejunostomy tube placed: 18 Swedish  Glucagon administered intravenously: Yes  Internal catheter securement: Balloon  External catheter securement: Retention disc    Contrast  Contrast agent: Visipaque 320  Contrast volume (mL): 10    Radiation Dose  Fluoroscopy time (minutes): 6    Reference air kerma (mGy): 16.2   Kerma area product (uGy-m2): 270.67     Additional Details  Additional description of procedure: None  Acute procedural success: Yes  Registry event: V/3/f  Device used: None  Equipment details: None  Unique Device Identifiers: Not available  Specimens removed: None  Estimated blood loss (mL): Less than 10  Standardized report: [email or web address]_v3.1    Attestation  Signer name: CLAUDINE ESTRADA MD    I have personally reviewed the examination and initial interpretation  and I agree with the findings.    CLAUDINE ESTRADA MD         SYSTEM ID:  [id number]   XR Chest Port 1 View    Narrative    XR CHEST PORT 1 VIEW 7/5/2024 6:48 AM      HISTORY: Lung tx    COMPARISON: 7/2/2024.     FINDINGS: Frontal view of the chest. Extubation and placement of a  tracheostomy tube which projects over the thoracic inlet. Remaining  support devices are stable. Postoperative changes are stable. Heart  size  is stable. Perihilar and left greater than right bibasilar  interstitial opacities have not changed. No pneumothorax or pleural  effusion.      Impression    IMPRESSION: Stable postoperative chest status post placement of a  tracheostomy tube.    I have personally reviewed the examination and initial interpretation  and I agree with the findings.    TERRI ISSA MD         SYSTEM ID:  [id number]   US Ext Arterial Venous Dialys Acs Graft    Narrative    Exam: Duplex ultrasound of the left extremity dialysis fistula dated  7/5/2024 6:26 PM    Comparison examination: Fistulogram 3/5/2024    Clinical history: Prolonged bleeding - evaluate patency    Technique: B-mode (grayscale) and duplex Doppler ultrasound of the  fistula including the arterial inflow through the venous outflow,  including any incidental findings. Velocity measurements obtained with  angle of insonation at or below 60 degrees. Flow volumes obtained in a  segment of the venous outflow.    Findings:    Left upper extremity    Innominate and internal jugular vein on the left were not seen due to  tracheostomy collar.    Maxillary artery: 168 cm/s  Brachial artery below the anastomosis: 102 cm/s    Graft at the inflow anastomosis: 226 cm/s measuring 0.5 cm in diameter  Graft below inflow anastomosis: 416 cm/s  Graft in the upper arm: 162 cm/s  Graft in the mid arm: 110 cm/s  Graft in the antecubital fossa: Flow volume of 606 cc/m peak systolic  velocity of 101 cm/s  Graft in the antecubital fossa outflow side: 74 cm/s  Graft below the outflow anastomosis: 104 cm/s  Craft at the outflow anastomosis: 147 cm/s with a diameter of 0.5 cm    Cephalic vein above the outflow anastomosis: 145 cm/s  Cephalic vein in the mid arm: 160 cm/s; flow volume of 825 cc/m  Cephalic vein in the upper arm: 68 cm/s  Cephalic vein in the shoulder: 132 cm/s  Cephalic vein near the subclavian confluence: 175 cm/s  Cephalic vein at the subclavian confluence: 416 cm/s  Lateral  subclavian vein: 77 cm/s  Mid subclavian vein: 178 cm/s      Impression    Impression:  1. Arterial inflow: Patent without inflow stenosis.    2. Fistula anastomosis evaluation: Normal diameter, without evidence  of stenosis.    3. Venous outflow: Focal elevation of velocity at the cephalic  vein-subclavian confluence, suggesting recurrent stenosis in this  region after angioplasty on 3/5/2024.    I have personally reviewed the examination and initial interpretation  and I agree with the findings.    KRISTINE CURRY MD         SYSTEM ID:  [id number]   XR Chest Port 1 View    Narrative    XR CHEST PORT 1 VIEW  7/8/2024 5:27 AM     HISTORY:  evaluate lung transplant       COMPARISON:  7/5/2024    TECHNIQUE: Portable, supine, frontal projection radiograph of the  chest.    FINDINGS:   Stable positioning of right IJ central venous catheter and  tracheostomy. Interval removal of feeding tube.    Stable cardiovascular silhouette. Sharp costophrenic angle. Trace  blunting of the right costophrenic angle. No appreciable pneumothorax.  Unchanged left greater than right streaky opacities.        Impression    IMPRESSION:  Interval removal of feeding tube. Otherwise stable support devices.  Stable opacities.    I have personally reviewed the examination and initial interpretation  and I agree with the findings.    REJI VIVAS DO         SYSTEM ID:  [id number]   IR Dialysis Fistulogram Left    Narrative    PROCEDURE: Dialysis circuit evaluation and interventions    Procedural Personnel  Attending physician(s): Kristine Curry  Fellow physician(s): Chrissy Lobo  Resident physician(s): None  Advanced practice provider(s): None    Pre-procedure diagnosis: Fistula stenosis  Post-procedure diagnosis: Same  Indication: Prolonged bleeding at dialysis  Additional clinical history: None    Complications: No immediate complications.      Impression    IMPRESSION:    1. Segmental stenosis of the subclavian vein.  2. Uneventful balloon angioplasty  of the subclavian stenosis.    Plan:    The graft can be used for dialysis immediately.  _______________________________________________________________    PROCEDURE SUMMARY:  - Access of dialysis circuit with ultrasound guidance  - Procedure: Angiogram and segment angioplasty  - Additional procedure(s): None    PROCEDURE DETAILS:    Pre-procedure  Consent: Informed consent for the procedure including risks, benefits  and alternatives was obtained and time-out was performed prior to the  procedure.  Preparation: The site was prepared and draped using maximal sterile  barrier technique including cutaneous antisepsis.    Anesthesia/sedation  Level of anesthesia/sedation: Moderate sedation (conscious sedation)  Anesthesia/sedation administered by: Independent trained observer  under attending supervision with continuous monitoring of the  patient?s level of consciousness and physiologic status  Total intra-service sedation time (minutes): 60    Initial dialysis circuit evaluation  Dialysis circuit side: Left  Dialysis circuit type: Forearm loop graft  Initial circuit palpation: Palpable thrill    Access (towards outflow)  Local anesthesia was administered. The dialysis circuit was  sonographically evaluated. Real time ultrasound was used to vistualize  needle entry into the dialysis circuit and a permanent image was  stored.   Access technique: Micropuncture set with 21 gauge needle  Sheath size (Bhutanese): 7    Initial angiography  Initial angiography of the dialysis circuit, outflow veins, and  central veins was performed.  Findings: Segment stenosis of the subclavian vein.    Angioplasty  Location: Left subclavian vein  Angioplasty balloon: Romulus  Angioplasty balloon type: Conventional  Balloon length (mm): 40  Balloon diameter (mm): 8    Persistent waist after angioplasty with 8 x 40 Romulus balloon.    Additional angioplasty was performed sequentially with 6 x 40 and 8 x  40 Conquest balloons.  Location: Left  subclavian vein  Angioplasty balloon: Conquest  Angioplasty balloon type: Conventional  Balloon length (mm): 40  Balloon diameter (mm): 6, 8    Final angiography  Findings: Complete resolution of the stenosis in left subclavian vein.  Final dialysis circuit palpation: Palpable thrill    Closure (outflow)  The sheath was removed and hemostasis was achieved with an external  device. A sterile dressing was applied.    Contrast  Contrast agent: Visipaque 320  Contrast volume (mL): 30    Radiation Dose  Fluoroscopy time (minutes): 8.3    Reference air kerma (mGy): 38.2   Kerma area product (uGy-m2): 229.66      Additional Details  Additional description of procedure: None  Registry event: V/3/g  Device used: None  Equipment details: None  Unique Device Identifiers: Not available  Specimens removed: None  Estimated blood loss (mL): Less than 30  Standardized report: [email or web address]nandIntervention_v3.1    Attestation  Signer name: SANDY CURRY MD  I attest that I was present for the entire procedure. I reviewed the  stored images and agree with the report as written.   CT Chest w/o Contrast    Narrative    Chest CT without contrast    INDICATION: Evaluate bilateral lung opacities worsening compared to  previous CT per pulmonary transplant    COMPARISON: Most recent plain film earlier this morning. Most recent  available CT on PACS high-resolution study 6/18/2024    FINDINGS:  shows tracheostomy and median sternotomy. Opacities  are similar to the portable plain film noted is well.  No contrast. Dental artifact. The included thyroid appears  unremarkable. Tracheostomy appears appropriately positioned.  Atherosclerotic calcification left subclavian artery as well as right  brachiocephalic artery and in the thoracic aorta itself. Heart size  upper normal. Thoracic aorta is ectatic approximately 4.0 cm in its  mid ascending portion. Main pulmonary artery approximately 3.0 cm also  upper normal.  Mild left  breast skin thickening. This is unchanged.  No enlarged axillary, mediastinal or discrete hilar lymph nodes. Mild  pleural thickening bilaterally the lung bases appears slightly  decreased from the previous CT scan.  The included upper abdomen those right upper quadrant calcification  concerning for gallstone. Partially imaged tubing in the distal  stomach. Other atherosclerotic calcification in the splenic artery  which is quite tortuous.  Calcified right hilar lymph nodes.    Bone detail shows median sternotomy related to bilateral lung  transplant. Bones are osteopenic. Compression fracture T5 without  retropulsion and unchanged.  Small calcified right lower lobe superior segment granuloma. Mosaic  attenuation in the right upper lobe and superior segment right lower  lobe with increased groundglass organized opacities throughout the  basilar segments of the right lower lobe with some mosaic attenuation.  Left lung organizing groundglass opacities are present throughout the  entire lower lobe as well as much of the lingula and to lesser extent  left upper lobe proper. No other solid nodules. Calcified granuloma  left lower lobe. Rim calcified density at the left pleura posteriorly  unchanged.  Comparison with previous shows slight improvement in the right upper  lobe aeration inferiorly. There is also improved aeration in the  session the right lower lobe with the current organized opacities  previously appearing as dense consolidative opacities with air  bronchograms.  The left lung however show some increased groundglass opacification in  the upper lobe compared with previous in the lower lobe consolidative  opacities appear slightly decreased in the mosaicism appears similar.  Stent right mainstem bronchus and right upper lobe bronchus also in  rhonchorous intermedius. These do not appear significantly opacified  internally.  Right IJ catheter tip impression right atrium. Other vascular  calcification in the  SVC as well.      Impression    IMPRESSION:  1. Waxing and waning mostly improved right lung consolidation now with  diffuse groundglass organizing opacities mostly in the lower lobe an  middle lobe more than the upper lobe. Additionally, slight improvement  in the peribronchiolar consolidation in the left lower lobe but there  is increased groundglass organization in the left upper lobe. Grossly  patent stents right mainstem bronchus, proximal upper lobe bronchus  and bronchus intermedius.  2. Cholelithiasis.  3. Bilateral lung transplantation.  4. Atherosclerosis.  5. Tracheostomy.  6. Ectatic descending thoracic aorta approximately 4.0 cm.  7. Decreased pleural thickening lung bases.  8. Unchanged osteopenic T5 vertebral compression fracture.    TERRI ISSA MD         SYSTEM ID:  [id number]   XR Chest Port 1 View    Narrative    XR CHEST PORT 1 VIEW  7/11/2024 6:33 AM     HISTORY:  interval follow up, lung transplant       COMPARISON:  7/8/2024    TECHNIQUE: Portable, semiupright, frontal projection radiograph of the  chest.    FINDINGS:   Stable position of tracheostomy tube and right IJ central venous  catheter. Intact sternotomy wires. Stable bronchial stent on the  right.    Stable cardiomediastinal silhouette. Increased hazy opacity throughout  the left lung. Stable mixed interstitial and airspace opacities on the  left greater than right. No pleural effusions. No pneumothorax.        Impression    IMPRESSION:  Stable support devices. Increased opacities throughout the left mid  and lower lung fields, possibly atelectasis versus edema.    I have personally reviewed the examination and initial interpretation  and I agree with the findings.    CARMELINA VILLALOBOS MD         SYSTEM ID:  [id number]   US Ext Arterial Venous Dialys Acs Graft    Narrative    ULTRASOUND EXTREMITY ARTERIAL VENOUS DIALYSIS ACCESS GRAFT 7/12/2024  12:20 PM    CLINICAL HISTORY: Pt with prolonged bleeding time post dialysis x1 and  arm  swelling with last run despite subclavian venoplasty earlier this  week, eval for structural issue..     COMPARISONS: Fistulogram and venoplasty 7/8/2024. Ultrasound extremity  arterial venous dialysis access graft.    REFERRING PROVIDER: YOON RILEY    TECHNIQUE: Left brachial cephalic graft evaluated with grayscale,  color Doppler, and spectral pulsed wave Doppler ultrasound. Flow  volumes obtained.    FINDINGS:   LEFT:  Brachial artery, axilla, above anastomosis: 205/95 cm/s  Brachial artery, axilla, above anastomosis: 5.2 mm, 849 cc/min Vol  Flow    Brachial artery, below anastomosis: 90/10 cm/s, antegrade    Arterial anastomosis: 425 cm/s, 2.07 velocity ratio  Graft, mid arm: 197 cm/s  Graft, mid arm: 6.3 mm, 1227 cc/min Vol Flow  Graft, low arm: 20 mm, 183 cm/s  Graft, antecubital fossa: 119 cm/s  Venous anastomosis: 214 cm/s    Cephalic vein, central to anastomosis: 183 cm/s  Cephalic vein, low arm: 9.1 mm, 1469 cc/min Vol Flow  Cephalic vein, upper arm: 88 cm/s  Cephalic vein, chest: 165 cm/s    Cephalic subclavian confluence: 411 cm/s, 2.19 velocity ratio    Axillary vein: 40 cm/s    Subclavian vein, mid: 80 cm/s  Subclavian vein, medial: 471 cm/s  Innominate vein: 160 cm/s    Internal jugular vein: obscured by dressing.      Impression    IMPRESSION: LEFT brachial cephalic graft  1. No arterial inflow stenosis demonstrated.    2. Graft patent. 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  Flow volume exceeds 600 mL/min.    3. Elevated velocities at the cephalic subclavian confluence and  subclavian vein to 411 cm/s and 471 cm/s but the velocity ratios are  less than 3.    OBIE DAWSON MD         SYSTEM ID:  [id number]   CT Chest w/o Contrast    Narrative    EXAMINATION: Chest CT  7/17/2024 9:37 AM    CLINICAL HISTORY: evaluate for worsening immune/inflammatory pathology  in setting of bilateral lung tranpslant in 2018    COMPARISON: 7/8/2024.    TECHNIQUE: CT imaging obtained through the chest  without contrast.  Axial, coronal, and sagittal reconstructions and axial MIP reformatted  images are reviewed.     CONTRAST: None    FINDINGS:  Devices: Tracheostomy tube terminates in the mid thoracic trachea.  Percutaneous gastrojejunostomy tube in place with the balloon well  approximated within the stomach and the anterior abdominal wall in tip  terminating out of the field of view beyond the ligament of Treitz.  Intact medial and sternotomy wires.    Lungs: Status post bilateral lung transplantation. Azygos fissure. The  airway is patent. Patent right main stem bronchus stent which extends  into the proximal upper lobe and and proximal intermedius. No pleural  effusion or pneumothorax. Overall slightly improved appearance of the  multifocal groundglass confluent opacities throughout the bilateral  lower lobes with more dense consolidation at the bases. More  appreciable improvement in the lingula and right middle lobe with  relative sparing of the apices. Unchanged peripherally calcified  pleural plaque in the left posteromedial chest. Calcified granuloma in  the right lower lobe.    Mediastinum: The thyroid is unremarkable. Cardiac size is normal.  Ascending aorta measures 4.0 cm. Normal three-vessel branching  pattern. No pericardial effusion. No significant coronary artery  calcium. Scattered atherosclerotic calcifications of the thoracic  aorta. No thoracic lymphadenopathy. Calcified right hilar lymph node.  Esophagus is normal in caliber.    Bones and soft tissues: No suspicious bone findings. Chronic  depression type vertebral body deformity at T5. Thickening of the left  breast skin is similar to prior.    Upper Abdomen: Limited evaluation of the upper abdomen. Heavy  calcification of the splenic artery. Splenomegaly. Calcified  gallstones.      Impression    IMPRESSION:   1. Overall improved appearance of the confluent groundglass opacities  primarily affecting the bilateral lower lobes. There is a  more  appreciable improvement in the right middle lobe and lingula with  relative sparing of the apices.   2. Status post bilateral lung transplantation with stable support  devices.  3. Cholelithiasis.  4. Ectatic ascending aorta measuring up to 4.0 cm.  5. Atherosclerosis.    I have personally reviewed the examination and initial interpretation  and I agree with the findings.    TERRI ISSA MD         SYSTEM ID:  [id number]   Echo Complete     Value    LVEF  55-60%    Narrative    [id number]  [id number]  [id number]  [id number]^VELEZ REYES^JAMEL     [facility],[place]  Echocardiography Laboratory  [address] [place] [address]     Name: SARAI KILLIAN  MRN: [id number]  : 1960  Study Date: 2024 08:11 AM  Age: 63 yrs  Gender: Female  Patient Location: [facility]  Reason For Study: Cardiomyopathy  Ordering Physician: VELEZ REYES, GERMAN  Performed By: Patricia Mandujano RDCS     BSA: 1.5 m2  Height: 63 in  Weight: 112 lb  HR: 80  BP: 94/45 mmHg  ______________________________________________________________________________  Procedure  Complete Portable Echo Adult.  ______________________________________________________________________________  Interpretation Summary  Global and regional left ventricular function is normal with an EF of 55-60%.  Right ventricular function, chamber size, wall motion, and thickness are  normal.  The inferior vena cava cannot be assessed.  No pericardial effusion is present.  No significant valvular abnormalities present.  ______________________________________________________________________________  Left Ventricle  Global and regional left ventricular function is normal with an EF of 55-60%.  Left ventricular wall thickness is normal. Left ventricular size is normal.  Left ventricular diastolic function is normal. No regional wall motion  abnormalities are seen.     Right Ventricle  Right ventricular function, chamber size, wall motion, and  thickness are  normal.     Atria  Both atria appear normal.     Mitral Valve  The mitral valve is normal. Trace mitral insufficiency is present.     Aortic Valve  The valve leaflets are not well visualized. On Doppler interrogation, there is  no significant stenosis or regurgitation.     Tricuspid Valve  Mild tricuspid insufficiency is present. The right ventricular systolic  pressure is approximated at 35.5 mmHg plus the right atrial pressure.     Pulmonic Valve  The pulmonic valve is normal.     Vessels  The thoracic aorta is normal. The pulmonary artery is normal. The inferior  vena cava cannot be assessed.     Pericardium  No pericardial effusion is present.     Miscellaneous  No significant valvular abnormalities present.  ______________________________________________________________________________  MMode/2D Measurements & Calculations     IVSd: 1.2 cm  LVIDd: 4.0 cm  LVIDs: 2.8 cm  LVPWd: 1.1 cm  FS: 29.9 %  LV mass(C)d: 161.0 grams  LV mass(C)dI: 106.5 grams/m2  Ao root diam: 3.2 cm  asc Aorta Diam: 3.0 cm  LVOT diam: 2.0 cm  LVOT area: 3.3 cm2  Ao root diam index Ht(cm/m): 2.0  Ao root diam index BSA (cm/m2): 2.1  Asc Ao diam index BSA (cm/m2): 2.0  Asc Ao diam index Ht(cm/m): 1.9  RWT: 0.57  TAPSE: 1.3 cm     Doppler Measurements & Calculations  MV E max herberth: 82.3 cm/sec  MV A max herberth: 85.9 cm/sec  MV E/A: 0.96     MV dec slope: 271.7 cm/sec2  MV dec time: 0.30 sec  PA acc time: 0.10 sec  TR max herberth: 298.0 cm/sec  TR max P.5 mmHg  E/E' avg: 15.7  Lateral E/e': 19.4  Medial E/e': 12.0     ______________________________________________________________________________  Report approved by: Glen Zhang 2024 09:29 AM           *Note: Due to a large number of results and/or encounters for the requested time period, some results have not been displayed. A complete set of results can be found in Results Review.       Discharge Medications   Current Discharge Medication List        CONTINUE these  medications which have NOT CHANGED    Details   acetaminophen (TYLENOL) 325 MG tablet Take 1 tablet (325 mg) by mouth every 4 hours as needed for mild pain or fever  Qty: 60 tablet    Associated Diagnoses: S/P lung transplant (H)      acetylcysteine (MUCOMYST) 10 % nebulizer solution Inhale 4 mLs into the lungs 3 times daily as needed for mucolysis/respiratory distress    Associated Diagnoses: Lung replaced by transplant (H)      albuterol (PROVENTIL) (2.5 MG/3ML) 0.083% neb solution Take 1 vial (2.5 mg) by nebulization every 12 hours    Associated Diagnoses: Lung replaced by transplant (H); S/P lung transplant (H); Hypomagnesemia; Long term (current) use of systemic steroids      amoxicillin (AMOXIL) 875 MG tablet Take 1 tablet (875 mg) by mouth 2 times daily  Qty: 60 tablet, Refills: 1    Associated Diagnoses: Postobstructive pneumonia      azithromycin (ZITHROMAX) 250 MG tablet Take 1 tablet (250 mg) by mouth daily    Associated Diagnoses: Lung replaced by transplant (H)      benzonatate (TESSALON) 100 MG capsule Take 1 capsule (100 mg) by mouth every 4 hours as needed for cough  Qty: 30 capsule, Refills: 0    Associated Diagnoses: Lung replaced by transplant (H)      cholecalciferol 50 MCG (2000 UT) CAPS Take 1 capsule by mouth daily On dialysis days (MWF)      fluticasone-salmeterol (ADVAIR) 250-50 MCG/ACT inhaler Inhale 1 puff into the lungs every 12 hours  Qty: 60 each, Refills: 11    Associated Diagnoses: Lung replaced by transplant (H)      Magnesium Cl-Calcium Carbonate (SLOW-MAG) 71.5-119 MG TBEC Take 2 tablets by mouth four times a week Take on non dialysis days T/Th/Sa/Su  Qty: 90 tablet, Refills: 3    Associated Diagnoses: Lung replaced by transplant (H)      magnesium oxide 400 MG CAPS Take 400 mg by mouth Tuesday, Thursday, Saturday, Sunday      metoprolol tartrate (LOPRESSOR) 25 MG tablet Take 1 tablet (25 mg) by mouth 2 times daily    Associated Diagnoses: Lung replaced by transplant (H)       montelukast (SINGULAIR) 10 MG tablet Take 1 tablet (10 mg) by mouth At Bedtime  Qty: 30 tablet, Refills: 11    Associated Diagnoses: Lung replaced by transplant (H)      multivitamin RENAL (RENAVITE RX/NEPHROVITE) 1 tablet tablet Take 1 tablet by mouth Tuesday, Thursday, Saturday, Sunday in evening.      pantoprazole (PROTONIX) 40 MG EC tablet Take 1 tablet (40 mg) by mouth every morning (before breakfast)  Qty: 30 tablet, Refills: 11    Associated Diagnoses: Gastroesophageal reflux disease without esophagitis; Lung replaced by transplant (H)      posaconazole (NOXAFIL) 100 MG EC tablet Take 3 tablets (300 mg) by mouth daily  Qty: 90 tablet, Refills: 11    Associated Diagnoses: S/P lung transplant (H)      predniSONE (DELTASONE) 5 MG tablet Take 1 tablet (5 mg) by mouth daily  Qty: 30 tablet, Refills: 11    Comments: TXP DT 3/1/2018 (Lung) TXP Dischg DT  DX Lung replaced by transplant Z94.2 [facility] ([place], [place])  Associated Diagnoses: Lung replaced by transplant (H); Chronic lung allograft dysfunction (CLAD) (H)      sulfamethoxazole-trimethoprim (BACTRIM) 400-80 MG tablet Take 1 tablet by mouth three times a week  Qty: 13 tablet, Refills: 11    Associated Diagnoses: S/P lung transplant (H)      tacrolimus (GENERIC) 1 mg/mL suspension Take 0.6 mLs (0.6 mg) by mouth every morning AND 0.7 mLs (0.7 mg) every evening.  Qty: 39 mL, Refills: 11    Comments: TXP DT 3/1/2018 (Lung) TXP Dischg DT   DX Lung replaced by transplant Z94.2 [facility] ([place], [place])  Associated Diagnoses: Lung replaced by transplant (H)           Allergies   Allergies   Allergen Reactions    Isopropyl Alcohol Other (See Comments)     The alcohol burns the open areas on her skin when used as a skin prep for procedures    Vancomycin Other (See Comments)     Diffuse erythroderma with itching (improved with Benadryl) after receiving vancomycin  over 1 hour. 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 Can use vancomycin in the future, but please give over slower infusion time (at least 2 hours) and premedicate with Benadryl.

## 2024-07-18 NOTE — PLAN OF CARE
Problem: Mechanical Ventilation Invasive  Goal: Effective Communication  Outcome: Progressing  Goal: Optimal Device Function  Outcome: Progressing  Intervention: Optimize Device Care and Function  Recent Flowsheet Documentation  Taken 7/18/2024 1451 by Makenzie Mathews, RT  Airway Safety Measures: all equipment/monitors on and audible  Goal: Mechanical Ventilation Liberation  Outcome: Progressing  Goal: Absence of Device-Related Skin and Tissue Injury  Outcome: Progressing  Goal: Absence of Ventilator-Induced Lung Injury  Outcome: Progressing   Goal Outcome Evaluation:    RT PROGRESS NOTE     DATA:     CURRENT SETTINGS: 18, 320, 5             TRACH TYPE / SIZE:  #6 Shiley TG placed on 7/8/24             MODE:   CMV             FIO2:   30%     ACTION:             THERAPIES:   Xopenex x1, mucomyst bid             SUCTION:                           FREQUENCY:   x1                        AMOUNT:   sm                        CONSISTENCY:   thick                        COLOR:   white             SPONTANEOUS COUGH EFFORT/STRENGTH OF EFFORT (not elicited by suctioning): fair                               RESPONSE:             BS:   rhonchi diminished             VITAL SIGNS:   SPO2 %, -133, RR 26-38             EMOTIONAL NEEDS / CONCERNS:  Anxious                RISK FOR SELF DECANNULATION:  No                               NOTE / PLAN:     Pt admitted today on vent with above settings. Pt very anxious, c/o sob; adjust IT to 0.85.  Pt will be transfer back to the

## 2024-07-18 NOTE — DISCHARGE SUMMARY
[facility]  Discharge Summary - Medicine & Pediatrics       Date of Admission:  6/18/2024  Date of Discharge:  7/18/2024  Discharging Provider: Sid Sanches MD  Discharge Service: Hospitalist Service    Discharge Diagnoses   Acute on chronic hypoxic hypercapnic respiratory failure  Hospital Acquired Pneumonia- Stenotrophomonas maltophilia, Enterococcus faecalis, and Candida guilliermondi complex  Severe pulmonary edema  Acute respiratory distress syndrome, resolved  Toxic metabolic encephalopathy   Anxiety   Recurrent right middle bronchus stenosis s/p dilation and stent   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   Septic shock  Paroxysmal atrial fibrillation   Pulmonary Hypertension   Transient arhythmia with palpitations  Demand Ischemia  Prolonged Qtc  Diarrhea  Cholelithiasis with gallbladder wall thickening  End Stage Renal Disease  Acute on Chronic Normocytic Anemia  Thrombocytopenia    Clinically Significant Risk Factors     # Moderate Malnutrition: based on nutrition assessment      Follow-ups Needed After Discharge   - Transplant pulmonology in 6-8 weeks  - Interventional pulmonology in 1 month for repeat bronchoscopy (8/30)  - Transplant Infectious Disease on 8/23    Unresulted Labs Ordered in the Past 30 Days of this Admission       Date and Time Order Name Status Description    7/18/2024 12:00 AM Tacrolimus by Tandem Mass Spectrometry In process     7/9/2024  8:19 AM Morphology Tracking In process     7/3/2024 11:30 AM Acid-Fast Bacilli Culture and Stain with AFB Stain In process     7/3/2024 11:24 AM Fungal or Yeast Culture Routine Preliminary     6/21/2024  5:04 PM Fungus Culture, non-blood - BAL Site 1 Preliminary     6/21/2024  5:04 PM Acid-Fast Bacilli Culture and Stain In process     6/19/2024  1:19 PM Acid-Fast Bacilli Culture and Stain In process         These results will be followed up by [facility]    Discharge Disposition   Discharged to [facility]  Condition at discharge: Stable    Hospital Course   Kecia Blue is a 63 year old female with PMH ILD s/p bilateral lung transplant (2018) c/b recurrent right bronchial stenosis s/p repeat balloon dilation/stenting, repeat opportunistic pneumonia (PJP 2021, aspergillus/stenotrophomonas 11/2023) and viremias (EBV, CMV), and 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 Hospital course complicated by hypotension, delirium, and prolonged respiratory failure.  She has a tracheostomy placement on 7/3 and PEG-J tube placement with IR on 7/5. The following problems were addressed during hospitalization:     Neuro:  #Pain  - Acetaminophen 1 gram q8h prn        #Toxic metabolic encephalopathy, resolved  Patient noted to have lethargy in setting of sepsis and delirium in the setting of high-dose steroid therapy which improved with management of underlying sepsis. Patient mental status returned to baseline at day of discharge.   - Quetiapine to 25 mg at bedtime   - Ramalteon at bedtime  - Trazodone 25mg for sleep  - Delirium precautions  - Do not disturb order overnight to promote sleep     #Anxiety, improving  Patient has been having anxiety regarding pressure support trials and weaning from the ventilator. Health Psychology consulted and followed patient weekly while hospitalized.      Pulmonary:  #Acute on chronic hypoxic hypercapnic respiratory failure, improving  #Concern for possible immune vs inflammatory process, improving  #HAP, Stenotrophomonas maltophilia, Enterococcus faecalis, and Candida guilliermondi complex  #Severe pulmonary edema  #Acute respiratory distress syndrome, resolved  Presented to the ED on 6/18/2024 with acute on chronic hypoxic hypercapnic respiratory failure. Transferred to MICU and intubated  on overnight on 6/18. Workup significant for Stenotrophomonas, Enterococcus, and Candida pneumonia. Course was c/b progression to ARDS (6/21-22). CXR (6/26) with similar pulmonary opacities compared to 6/24, left > right.  The etiology of this groundglass opacity was unclear but ddx includes rejection vs. infection vs. volume overload. Tracheostomy placed on 7/3 due to inability to wean from ventilator. CT chest w/o contrast 7/8 demonstrated waxing and waning mostly improved right lung consolidation now with diffuse groundglass organizing opacities mostly in the lower lobe an middle lobe more than the upper lobe. Slight improvement in the peribronchiolar consolidation in the left lower lobe but there is increased groundglass organization in the left upper lobe. CT on 7/17 demonstrated improvement in bibasilar predominant GGO.  - Pulmonary toilet  - Mucomyst BID  - Hypertonic saline BID, alternate with mucomyst  - Albuterol neb QID  - Antibiotics as below  - Volume management with iHD  - Continue Daily SBT (AM and PM) on pressure support 10/5  - Pulm Transplant following while inpatient   - Plan for transplant pulmonology follow-up in 6-8 weeks    Vent Mode: CMV/AC  (Continuous Mandatory Ventilation/ Assist Control)  FiO2 (%): 30 %  Resp Rate (Set): 18 breaths/min  Tidal Volume (Set, mL): 320 mL  PEEP (cm H2O): 5 cmH2O  Pressure Support (cm H2O): 10 cmH2O  Resp: 25     #Recurrent right middle bronchus stenosis s/p dilation and stent (6/20/2024), stable  Has history (bronchoscopy 2/15/24) demonstrating narrowing of right mainstem transplant anastomosis and bronchus intermedius. CT chest (6/18/2024) and bronchoscopy (6/19/2024) demonstrated occlusion of right middle bronchus. With concern for post-obstructive pneumonia, interventional pulmonology was consulted, and completed bronchoscopy (6/20/2024) with tissue debulking, right middle bronchus balloon dilation to 11 mm, stent placement in right main bronchus, and  bifurcating cast placement in right upper bronchus. IP pulm re-evaluated bronchial stents with BAL and noted that they were open.    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   - Prospera (7/3) 2.26  - DSA (7/3) negative  - PTA nebs  - Fluticasone-viilanterol (breo ellipta) every day - HOLD with respiratory infection  - Montelukast every day   - Immunosuppressants per Tx Pulm:   - PTA Tacrolimus 1.5mg BID  - PTA Prednisone 5 mg daily   - PTA azathioprine - HOLD per Transplant pulmonology with repeat infections  - Peripheral smear (7/9) pending for pancytopenia  - Antibiotic prophylaxis               - Bactrim MWF for PJP ppx (monitor need to adjust pending HD plan)  - CMV weekly monitoring (qTuesday)  - Azithromycin 125mg per pulmonary, continue to monitor QTc  - Continue VGCV ppx (renally dosed, monitor need to adjust pending HD plan) with tentative plan for 3 weeks beyond completion of stress dose steroids   - Steroid Taper per Transplant Pulmonology as below:  Date Daily dose (mg)   7/11 50   7/25 45   8/8 40   8/22 35   9/5 30   9/19 25   10/17 20   11/14 15   12/12 10   1/9 5     - Hold PTA prednisone dose while completing steroid taper as above     Cardiovascular:  #Septic shock, resolved  On 6/19/2024, developed sepsis in the setting of stenotrophomonas pneumonia/enterococcus faecium VRE UTI. Etiology of shock likely distributive iso sepsis; less likely hypovolemic (not pulling volumes with CRRT), medication-associated (weaned off of propofol gtt) or obstructive (low suspicion for PE, echo 6/19 without evidence of cardiac dysfunction). Occasional episodes of hypotension overnight, but self-resolving and have not occurred in a couple  days. Related to HAP, resolved with treatment of infection as below.   - s/p SDS & pressors and midodrine, off of all support agents  - MAPs 70s-80s, stable     #Demand Ischemia, resolved    Presented with elevated troponin (peak 499). Not associated with chest pain or hypoxia. EKG without ST elevations/depressions or T wave inversions. Suspect demand ischemia in the setting of sepsis. Will continue to monitor symptoms and continuous telemetry. EKG 7/2 sinus rhythm with PACs.     #Paroxysmal A-Fib, improved  #Pulmonary Hypertension   #Transient arhythmia with palpitations  History of pulmonary hypertension (45 mmHg, echo 10/2023) in the setting of ILD and paroxysmal afib on PTA metoprolol tartrate BID. Not on anticoagulation for afib. Transient unspecified arhythmia overnight (7/9) with palpitations. EKG 7/9 sinus rhythm with fusion complexes and known RBBB. Repeat EKG 7/10 showing sinus rhythm with no RBBB or fusion complexes. Patient given magnesium 1g and 20mEq K+ for K 3.3 this am. No further episodes of palpitations. Currently in sinus rhythm.  - continue PTA metoprolol tartrate 25mg BID  - CTM     #Prolonged Qtc, resolved  Repeat EKG 7/13 with QTC of 483.   - Azithromycin ppx restarted per pulm transplant  -Qtc 476 (7/17)  -Daily EKG     GI/Nutrition:  #Nutrition  PEG-J placed by IR on 7/5, continue tube feeds.   - Nutrition consulted  - Tube feeds 30ml/hr; at goal     #Diarrhea, resolved  On 6/21, had 8 bowel movements. Occurred in the setting of scheduled bowel regimen and starting new antibiotics (meropenem, levofloxacin). C diff negative.   - Holding PRN bowel regimen for loose stools  - Miralax PRN   - Senna prn  - formula changes made per nutrition        #Cholelithiasis with gallbladder wall thickening, resolved  During admission patient found to have up trending LFTs and fever in setting of GB wall thickening on CT abdomen/pelvis, now resolved. MRCP completed showing borderline dilated CBD up to 1.1  cm, no significant intrahepatic biliary dilation, no evidence of choledocholithiasis, no evidence of acute cholecystitis.     Renal/Fluids/Electrolytes:  #ESRD on iHD (M,W,F)  History of ESRD 2/2 Tacrolimus toxicity on HD (MWF). With soft blood pressures, placed RIJ (6/20/2024) and started CRRT (6/20/2024). US of AVF 7/5: arterial inflow patent without inflow stenosis, normal diameter of anastamosis, venous outflow elevated velocity at subclavian- suggestive of recurrent stenosis in venous outflow (area of prior angioplasty in March). IR consulted who noted that IR fistulogram not required at that time, and was able to run iHD on beginning on 7/6 without issue.   - Nephrology consulted and following for ESRD, patient tolerating 3x weekly HD at time of discharge  - Renally-dosed medications  - BMP daily  - HD T/ Th/ Sa schedule now, may need to transition back to M/W/F following discharge from [facility]  - Replete K 3.2 with 20mEq KCL po     Endocrine:  #Risk for hyperglycemia with high dose steroids  Stress dose steroids discontinued, resume PTA prednisone 5 mg daily. Blood sugars have remained in appropriate range.  - NPH 8U BID  - CTM     ID:  #HAP, Stenotrophomonas maltophilia, Enterococcus faecalis, Aspergillus, and Candida guilliermondi complex  Presented to the ED on 6/18/2024 with SOB and hypercapnic respiratory failure. Found on bronchoscopy (6/19) to have RML obstruction by narrowing bronchus intermedius as well as copious mucopurulent secretions/mucus plugging. Previously treated for Stenotrophonomas/aspergillus pneumonia in 11/2023 with subsequent sputum culture (2/2024) negative for both Stenotrophonomas/Aspergillus. Etiology likely repeat Stenotrophomonas infection vs opportunistic infection from colonized microbe.   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                - Continue posaconazole 300 mg/day                - Complete 14 days of micafungin 150 mg/day today (6/25-7/9)- discontinued   - Continue VGCV prophylaxis  - Weekly CMV VL (Tuesdays, next 7/9), last was 39 (7/2)         - Azithromycin per pulmonary (holding due to QTC, but will follow up)  - TMP/SMX SS daily for PJP PPx (for life given h/o PJP)  - Awaiting susceptibilities on Aspergillus per transplant ID  - Present antibiotics               - Continue posaconazole 300 mg/day per pulm transplant (6/25 to present) for candida guillermondii and asergillus ochraceus on prior BAL  - Past antibiotics              - s/p micafungin 150 mg/day (6/25-7/9)  - s/p IV minocycline 100mg BD x 14 days total (6/20-7/5)- for stenotrophamonas  - PO linezolid 600 mg BID x 10 days (6/25-7/5)- for VRE in urine and BAL cultures  - S/p ceftazidime (6/25-6/28)               - S/p vancomycin (6/18-6/20)              - S/p zosyn (6/18-21)  - S/p Daptomycin (6/21-6/23)  - S/p Meropenem (6/21-6/24)  - S/p Levofloxacin (6/21-6/23)     - BAL fluid (7/3): pink, hazy, cell counts normal range, fluid sent for viral, bacterial and fungal cultures negative to date, cytology negative for malignancy and organisms; preliminary AFB negative     #Pyuria, Enterococcus faecium VRE and  ESBL E. Coli   Asymptomatic. With progressive IV pressor needs, obtained broad infectious workup which demonstrated UCx (6/19/2024) with Enterococcus faecium VR and ESBL E. Coli. S/p Daptomycin (6/21-6/23) and Meropenem (6/21-6/24).     #Hx Aspergillus PNA (11/2023)  #Hx PJP PNA (1/2021)  #Hx CMV viremia,  recurrent  #Hx EBV viremia  - Transplant ID consult, appreciate recs  PTA posaconazole (Treatment for hx of aspergillus PNA)  PTA azithromycin 250mg qd (CLAD ppx) (holding)  PTA bactrim (PJP ppx)  -CMV PCR 7/16 positive but <35     Hematology:    #Acute on Chronic Normocytic Anemia, stable  #Thrombocytopenia, chronic, improving  History of chronic anemia in the setting of kidney disease (baseline Hgb 10-11). Upon admission, Hgb 9. May be bone marrow suppression in the setting of chronic illness; potential contribution by blood loss with CRRT filter changes (~150-200c). Peripheral smear (6/18/2024) without evidence of schistocytes. Hemoglobin stabilized by time of discharge.      Musculoskeletal: - PT / OT consult,  encourage ambulation as tolerated  Skin: - Sacral Blanching, off load with frequent turns in bed      Consultations This Hospital Stay   PHARMACY TO DOSE VANCO  PHYSICAL THERAPY ADULT IP CONSULT  OCCUPATIONAL THERAPY ADULT IP CONSULT  NEPHROLOGY ESRD ADULT IP CONSULT  PULMONARY CF/TRANSPLANT ADULT IP CONSULT  INFECTIOUS DISEASE TRANSPLANT SOT ADULT IP CONSULT  NURSING TO CONSULT FOR VASCULAR ACCESS CARE IP CONSULT  NURSING TO CONSULT FOR VASCULAR ACCESS CARE IP CONSULT  NUTRITION SERVICES ADULT IP CONSULT  NUTRITION SERVICES ADULT IP CONSULT  VASCULAR ACCESS ADULT IP CONSULT  NUTRITION SERVICES ADULT IP CONSULT  PHARMACY IP CONSULT  INFECTION PREVENTION IP CONSULT  PHARMACY CRRT IP CONSULT  CARE MANAGEMENT / SOCIAL WORK IP CONSULT  PHARMACY CRRT IP CONSULT  PHARMACY IP CONSULT  NURSING TO CONSULT FOR VASCULAR ACCESS CARE IP CONSULT  GI PANCREATICOBILIARY ADULT IP CONSULT  INTERVENTIONAL PULMONARY ADULT IP CONSULT  ENT IP CONSULT  SURGERY GENERAL ADULT IP CONSULT  INTERVENTIONAL RADIOLOGY ADULT/PEDS IP CONSULT  PALLIATIVE CARE ADULT IP CONSULT  INTERVENTIONAL RADIOLOGY ADULT/PEDS IP CONSULT  INTERVENTIONAL RADIOLOGY ADULT/PEDS IP CONSULT  PSYCHOLOGY ADULT IP CONSULT  INTERVENTIONAL RADIOLOGY ADULT/PEDS IP  CONSULT  NURSING TO CONSULT FOR VASCULAR ACCESS CARE IP CONSULT  IP PALLIATIVE CARE SOCIAL WORK ADULT CONSULT    Code Status   Full Code     The patient was discussed with Dr. Sanches.    Kaitlynn Mcadams MD  [facility] [address] [place] [id number]  Phone: [phone]  ______________________________________________________________________    Physical Exam   Vital Signs: Temp: 98.1  F (36.7  C) Temp src: Oral BP: 111/70 Pulse: 89   Resp: 25 SpO2: 100 % O2 Device: Mechanical Ventilator    Weight: 116 lbs 9.97 oz  General Appearance: Alert and oriented, sitting up in chair on IPAD  Respiratory: Coarse breath sounds bilaterally, improved  Cardiovascular: Regular rate and rhythm, no M/R/G  GI: Soft, non-tender, non-distended. PEG- J without surrounding drainage or erythema  Skin: No rashes or lesions on exposed skin      Primary Care Physician   Ame Chow    Discharge Orders   No discharge procedures on file.    Significant Results and Procedures   Most Recent 3 CBC's:  Recent Labs   Lab Test 07/18/24  0657 07/17/24  0607 07/16/24  0547   WBC 2.5* 2.4* 2.7*   HGB 8.1* 8.0* 7.9*   * 104* 104*    153 167     Most Recent 3 BMP's:  Recent Labs   Lab Test 07/18/24  1117 07/18/24  0831 07/18/24  0657 07/17/24  0744 07/17/24  0607 07/16/24  0831 07/16/24  0547   NA  --   --  132*  --  133*  --  131*   POTASSIUM  --   --  3.4  --  3.2*  --  3.1*   CHLORIDE  --   --  92*  --  93*  --  92*   CO2  --   --  29  --  30*  --  24   BUN  --   --  63.3*  --  39.8*  --  86.1*   CR  --   --  2.42*  --  1.65*  --  2.84*   ANIONGAP  --   --  11  --  10  --  15   CHELSEY  --   --  8.1*  --  8.2*  --  7.9*   * 148* 128*   < > 150*   < > 166*    < > = values in this interval not displayed.     Most Recent 2 LFT's:  Recent Labs   Lab Test 07/11/24  0518 07/02/24  0359   AST 30 26   ALT 13 37   ALKPHOS 282* 176*   BILITOTAL 0.4 0.6     Most Recent 3  INR's:  Recent Labs   Lab Test 06/18/24  1418 03/05/24  0925 02/14/24  1050   INR 0.98 0.98 0.96       Discharge Medications     Current Facility-Administered Medications:     acetaminophen (TYLENOL) tablet 650 mg, 650 mg, Per J Tube, Q6H PRN, Rufina Huff CNP    acetylcysteine (MUCOMYST) 10 % nebulizer solution 4 mL, 4 mL, Inhalation, BID, Velez Reyes, German, MD, 4 mL at 07/18/24 0919    azithromycin (ZITHROMAX) half-tab 125 mg, 125 mg, Per J Tube, Daily, Rufina Huff CNP, 125 mg at 07/18/24 0804    carboxymethylcellulose PF (REFRESH PLUS) 0.5 % ophthalmic solution 1 drop, 1 drop, Both Eyes, Q1H PRN, Ken Goncalves MD, 1 drop at 07/01/24 1737    chlorhexidine (PERIDEX) 0.12 % solution 15 mL, 15 mL, Mouth/Throat, Q12H, Chio Cortez MD, 15 mL at 07/18/24 0803    dextrose 10% infusion, , Intravenous, Continuous PRN, Velez Reyes, German, MD, Stopped at 07/03/24 1600    glucose gel 15-30 g, 15-30 g, Oral, Q15 Min PRN **OR** dextrose 50 % injection 25-50 mL, 25-50 mL, Intravenous, Q15 Min PRN, 25 mL at 06/27/24 0139 **OR** glucagon injection 1 mg, 1 mg, Subcutaneous, Q15 Min PRN, Adilia Hunt MD    fiber modular (BANATROL TF) packet 1 packet, 1 packet, Per Feeding Tube, Q8H, Awa Watt MD, 1 packet at 07/18/24 0102    heparin 10,000 units/10 mL infusion (DIALYSIS USE), 500 Units/hr, Hemodialysis Machine, Continuous, Hardy Tran, ADRIÁN CNS, Last Rate: 0.5 mL/hr at 07/18/24 0821, 500 Units/hr at 07/18/24 0821    heparin ANTICOAGULANT injection 5,000 Units, 5,000 Units, Subcutaneous, Q8H, Jailyn Lou MD, 5,000 Units at 07/18/24 0604    insulin aspart (NovoLOG) injection (RAPID ACTING), 1-12 Units, Subcutaneous, Q4H, Edin Davis MD, 1 Units at 07/18/24 0831    insulin NPH injection 8 Units, 8 Units, Subcutaneous, BID, Velez Reyes, German, MD, 8 Units at 07/18/24 0805    levalbuterol (XOPENEX) neb solution 0.63 mg, 0.63 mg, Nebulization, 4x  Daily, Gareth Mosquera MD, 0.63 mg at 07/18/24 1113    lidocaine (LMX4) cream, , Topical, Q1H PRN, Kajal Chong APRN CNP    lidocaine 1 % 0.1-1 mL, 0.1-1 mL, Other, Q1H PRN, Kajal Chong APRN CNP, 20 mL at 07/05/24 0946    loperamide (IMODIUM) capsule 2 mg, 2 mg, Per J Tube, 4x Daily PRN, Rufina Huff CNP    metoprolol tartrate (LOPRESSOR) tablet 25 mg, 25 mg, Per J Tube, BID, Rufina Huff CNP, 25 mg at 07/18/24 0803    montelukast (SINGULAIR) tablet 10 mg, 10 mg, Per J Tube, At Bedtime, Rufina Huff CNP, 10 mg at 07/17/24 2106    multivitamin RENAL (RENAVITE RX/NEPHROVITE) tablet 1 tablet, 1 tablet, Per J Tube, Daily, Rufina Huff CNP, 1 tablet at 07/18/24 0803    ondansetron (ZOFRAN) injection 4 mg, 4 mg, Intravenous, Q6H PRN, Edin Davis MD    pantoprazole (PROTONIX) 2 mg/mL suspension 40 mg, 40 mg, Per J Tube, Daily, Rufina Huff CNP, 40 mg at 07/17/24 2059    posaconazole (NOXAFIL) DR tablet TBEC 300 mg, 300 mg, Per Feeding Tube, Daily, Josesito Pratt MD, 300 mg at 07/17/24 1305    [Held by provider] predniSONE (DELTASONE) tablet 5 mg, 5 mg, Oral, Daily, Rossana Sarabia APRN CNP, 5 mg at 07/11/24 0856    predniSONE (DELTASONE) tablet 50 mg, 50 mg, Per J Tube, Daily, Rufina Huff CNP, 50 mg at 07/18/24 0803    Prosource TF20 ENfit Compatibl EN LIQD (PROSOURCE TF20) packet 1 packet, 1 packet, Per J Tube, Daily, Rufina Huff CNP, 1 packet at 07/18/24 0805    QUEtiapine (SEROquel) tablet 25 mg, 25 mg, Per J Tube, At Bedtime, Rufina Huff CNP, 25 mg at 07/17/24 2106    ramelteon (ROZEREM) tablet 8 mg, 8 mg, Per J Tube, At Bedtime, Rufina Huff CNP, 8 mg at 07/17/24 2059    sodium chloride (NEBUSAL) 3 % neb solution 3 mL, 3 mL, Nebulization, BID, Velez Reyes, German, MD, 3 mL at 07/18/24 1113    sodium chloride (PF) 0.9% PF flush 10 mL, 10 mL, Intracatheter, Q8H, Kajal Chong, ADRIÁN CNP, 10 mL at  07/18/24 0806    sodium chloride (PF) 0.9% PF flush 10-20 mL, 10-20 mL, Intracatheter, q1 min prnArlette Hannah, APRN CNP    sodium chloride (PF) 0.9% PF flush 10-20 mL, 10-20 mL, Intracatheter, q1 min prn, Kajal Chong APRN CNP    sodium chloride 0.9% BOLUS 100-150 mL, 100-150 mL, Intravenous, Q15 Min PRN, Hardy Tran APRN CNS    Stop Heparin 60 minutes before end of treatment, , Does not apply, Continuous PRN, Hardy Tran APRN CNS    sulfamethoxazole-trimethoprim (BACTRIM) 400-80 MG per tablet 1 tablet, 1 tablet, Per J Tube, Once per day on Tuesday Thursday Saturday, Rufina Huff CNP, 1 tablet at 07/16/24 2040    tacrolimus (GENERIC) suspension 1 mg, 1 mg, Per G Tube, QAM, 1 mg at 07/18/24 0805 **AND** tacrolimus (GENERIC) suspension 1.5 mg, 1.5 mg, Per G Tube, QPM, Rufina Huff CNP, 1.5 mg at 07/17/24 1813    traZODone (DESYREL) half-tab 25 mg, 25 mg, Per J Tube, At Bedtime, Rufina Huff CNP, 25 mg at 07/17/24 2106    valGANciclovir (VALCYTE) solution 450 mg, 450 mg, Per J Tube, Once per day on Tuesday Saturday, Rufina Huff CNP, 450 mg at 07/16/24 2040    Vitamin D3 (CHOLECALCIFEROL) tablet 50 mcg, 50 mcg, Per J Tube, Once per day on Monday Wednesday Friday, Rufina Huff CNP, 50 mcg at 07/17/24 0747      Allergies   Allergies   Allergen Reactions    Isopropyl Alcohol Other (See Comments)     The alcohol burns the open areas on her skin when used as a skin prep for procedures    Vancomycin Other (See Comments)     Diffuse erythroderma with itching (improved with Benadryl) after receiving vancomycin over 1 hour. 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 Can use vancomycin in the future, but please give over slower infusion time (at least 2 hours) and premedicate with Benadryl.

## 2024-07-18 NOTE — PROGRESS NOTES
ICU Daily Rounding Checklist     Checklist Response Notes   Can sedation be reduced?  N/A    Can analgesia be reduced? No    Is delirium being assessed, addressed and prevented? Yes    Spontaneous awakening trial and/or Spontaneous breathing trial candidate?  Yes    Total fluid balance goal reviewed?  Yes Target Goals:        Per HD [12h]             Per HD [24h]   Is the patient at goals for lung protective ventilation? Yes    Head of bed elevation (30 degrees)? Yes    Skin breakdown assessment (prevention) completed? Yes    Is enteral nutrition at goal? Yes    Is blood glucose at goal? Yes    Deep venous thrombosis prophylaxis? Yes      Gastric ulcer prophylaxis?  Yes If coagulopathy (INR-1.5 PTT2x normal. Ph<50k), mechanical ventilation 48hr, history of GI bleed/ulcer within past year. TBL, SCI, or burn, or if >= 2 minor risk factors (sepsis, ICU stay 1 week, occult GI bleed > 6 days. glucocorticoid therapy, NSAID use, antiplatelet use)   Can Antibiotics be narrowed or discontinued? No    Early mobility candidate and physical therapy consulted? Yes    Is rivas catheter needed? No    Is central venous/arterial catheter needed? No    Has the family been updated? No    Are the patient's goals of care and code status current? Yes      Kaitlynn Mcadams MD  Internal Medicine PGY3

## 2024-07-18 NOTE — PLAN OF CARE
Physical Therapy Discharge Summary    Reason for therapy discharge:    Discharged to [facility]    Progress towards therapy goal(s). See goals on Care Plan in [facility] electronic health record for goal details.  Goals not met.  Barriers to achieving goals:   discharge from facility.    Therapy recommendation(s):    Continued therapy is recommended.  Rationale/Recommendations:  [facility].

## 2024-07-18 NOTE — PLAN OF CARE
Goal Outcome Evaluation:  ICU End of Shift Summary. See flowsheets for vital signs and detailed assessment.    Changes this shift: Alert and oriented. SR/ A fib intermittently overnight. Pressure supported successfully until approximately 2030. Current settings are CMV 30%, 18, 320, 5.     Plan: Dialysis this morning. Transfer to [facility] today            Overall Patient Progress: improvingOverall Patient Progress: improving

## 2024-07-18 NOTE — PROGRESS NOTES
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 Nephrology consulted for management of HD while admitted.      Interval History :   Mrs Blue is planned for discharge to TCU.  Very stable on HD since early in course when she needed CRRT on pressor for sepsis.  Needed fistulogram with subclavian plasty but stable since last week with no issues on runs.  Planning 3h/2L of UF today prior to discharge.      Assessment & Recommendations:   ESRD-ESKD: pt dialyzes MWF at [facility] (ph [phone], f [phone]) with Dr. Glasgow. Run time: 3.5 hrs. EDW 52.5 kg. Access: L AVF. +heparin 1,500u bolus.       -HD today on TTS for now unless discharge planning dictates otherwise.                  -No need for new dialysis consent, continuing long term HD for ESRD.                -Appreciate IR doing plasty 7/8 to fistula, temp line pulled.       Outpt Rx:       Volume-Wt as low as 49kg during her course (although may have been dry at the time as she needed some neosynephrine), EDW likely a bit lower with ICU stay ~50-51kg.      Pulm-Admitted with hypercapnic resp failure, treated for PNA. Trached, progressing well.       Electrolytes-Stable.     BMD-No acute issues.      Anemia-Hgb 8.1, last PRBC's 6/26 on venofer 50mg weekly but will hold while treating for infection. On Mircera 60mcg d5ioagn, will cover with short term 4k Epo with runs while admitted when stable enough for HD.       Nutrition-Novasource renal      Time spent: 40 minutes on this date of encounter for chart review, physical exam, medical decision making and co-ordination of care.     "  Recommendations were communicated to primary team via verbal communication.     Hardy Tran, ADRIÁN CNS  Clinical Nurse Specialist  [phone]    Review of Systems:   I reviewed the following systems:  Gen: No fevers or chills  CV: No CP at rest  Resp: No SOB at rest  GI: No N/V    Physical Exam:   I/O last 3 completed shifts:  In: 1225 [I.V.:10; NG/GT:525]  Out: -    /67   Pulse 88   Temp 98  F (36.7  C) (Oral)   Resp 26   Ht 1.6 m (5' 3\")   Wt 50.7 kg (111 lb 12.4 oz)   LMP 06/07/2014 (Exact Date)   SpO2 100%   BMI 19.80 kg/m       GENERAL APPEARANCE: Vent via trach   EYES: No scleral icterus  Pulmonary: Stable on vent  CV: Regular rhythm, normal rate   - Edema none  GI: soft, nontender, normal bowel sounds  MS: no evidence of inflammation in joints, no muscle tenderness  : No Basilio  SKIN: no rash, warm, dry  NEURO: No focal deficits    Labs:   All labs reviewed by me  Electrolytes/Renal -   Recent Labs   Lab Test 07/18/24  1248 07/18/24  1117 07/18/24  0831 07/18/24  0657 07/17/24  0744 07/17/24  0607 07/16/24  0831 07/16/24  0547   NA  --   --   --  132*  --  133*  --  131*   POTASSIUM  --   --   --  3.4  --  3.2*  --  3.1*   CHLORIDE  --   --   --  92*  --  93*  --  92*   CO2  --   --   --  29  --  30*  --  24   BUN  --   --   --  63.3*  --  39.8*  --  86.1*   CR  --   --   --  2.42*  --  1.65*  --  2.84*   * 170* 148* 128*   < > 150*   < > 166*   CHELSEY  --   --   --  8.1*  --  8.2*  --  7.9*   MAG  --   --   --  1.7  --  1.6*  --  1.9   PHOS  --   --   --  3.6  --  3.0  --  5.0*    < > = values in this interval not displayed.       CBC -   Recent Labs   Lab Test 07/18/24  0657 07/17/24  0607 07/16/24  0547   WBC 2.5* 2.4* 2.7*   HGB 8.1* 8.0* 7.9*    153 167       LFTs -   Recent Labs   Lab Test 07/11/24  0518 07/02/24  0359 07/01/24  1637 07/01/24  0346   ALKPHOS 282* 176*  --  173*   BILITOTAL 0.4 0.6  --  0.6   ALT 13 37  --  36   AST 30 26  --  25   PROTTOTAL 5.1* 5.4*  --  " 5.0*   ALBUMIN 2.6* 2.8* 2.7* 2.6*       Iron Panel -   Recent Labs   Lab Test 02/03/21  0415 12/13/18  1033 08/01/18  0921   IRON 51 16* 93   IRONSAT 36 7* 37   YOLA  --  302* 571*           Current Medications:  Current Facility-Administered Medications   Medication Dose Route Frequency Provider Last Rate Last Admin    acetylcysteine (MUCOMYST) 10 % nebulizer solution 4 mL  4 mL Inhalation BID Velez Reyes, German, MD   4 mL at 07/18/24 0919    azithromycin (ZITHROMAX) half-tab 125 mg  125 mg Per J Tube Daily Rufina Huff CNP   125 mg at 07/18/24 0804    chlorhexidine (PERIDEX) 0.12 % solution 15 mL  15 mL Mouth/Throat Q12H Chio Cortez MD   15 mL at 07/18/24 0803    fiber modular (BANATROL TF) packet 1 packet  1 packet Per Feeding Tube Q8H Awa Watt MD   1 packet at 07/18/24 1249    heparin ANTICOAGULANT injection 5,000 Units  5,000 Units Subcutaneous Q8H Jailyn Lou MD   5,000 Units at 07/18/24 0604    insulin aspart (NovoLOG) injection (RAPID ACTING)  1-12 Units Subcutaneous Q4H Edin Davis MD   3 Units at 07/18/24 1250    insulin NPH injection 8 Units  8 Units Subcutaneous BID Velez Reyes, German, MD   8 Units at 07/18/24 0805    levalbuterol (XOPENEX) neb solution 0.63 mg  0.63 mg Nebulization 4x Daily Gareth Mosquera MD   0.63 mg at 07/18/24 1113    metoprolol tartrate (LOPRESSOR) tablet 25 mg  25 mg Per J Tube BID Rufina Huff CNP   25 mg at 07/18/24 0803    montelukast (SINGULAIR) tablet 10 mg  10 mg Per J Tube At Bedtime Rufina Huff CNP   10 mg at 07/17/24 2106    multivitamin RENAL (RENAVITE RX/NEPHROVITE) tablet 1 tablet  1 tablet Per J Tube Daily Rufina Huff CNP   1 tablet at 07/18/24 0803    pantoprazole (PROTONIX) 2 mg/mL suspension 40 mg  40 mg Per J Tube Daily Rufina Huff, CNP   40 mg at 07/17/24 2059    posaconazole (NOXAFIL) DR tablet TBEC 300 mg  300 mg Per Feeding Tube Daily Josesito Pratt MD    300 mg at 07/17/24 1305    [Held by provider] predniSONE (DELTASONE) tablet 5 mg  5 mg Oral Daily Rossana Sarabia APRN CNP   5 mg at 07/11/24 0856    predniSONE (DELTASONE) tablet 50 mg  50 mg Per J Tube Daily Rufina Huff CNP   50 mg at 07/18/24 0803    Prosource TF20 ENfit Compatibl EN LIQD (PROSOURCE TF20) packet 1 packet  1 packet Per J Tube Daily Rufina Huff CNP   1 packet at 07/18/24 0805    QUEtiapine (SEROquel) tablet 25 mg  25 mg Per J Tube At Bedtime Rufina Huff CNP   25 mg at 07/17/24 2106    ramelteon (ROZEREM) tablet 8 mg  8 mg Per J Tube At Bedtime Rufina Huff CNP   8 mg at 07/17/24 2059    sodium chloride (NEBUSAL) 3 % neb solution 3 mL  3 mL Nebulization BID Velez Reyes, German, MD   3 mL at 07/18/24 1113    sodium chloride (PF) 0.9% PF flush 10 mL  10 mL Intracatheter Q8H Kajal Chong APRN CNP   10 mL at 07/18/24 0806    sulfamethoxazole-trimethoprim (BACTRIM) 400-80 MG per tablet 1 tablet  1 tablet Per J Tube Once per day on Tuesday Thursday Saturday Rufina Huff CNP   1 tablet at 07/16/24 2040    [START ON 7/19/2024] tacrolimus (GENERIC) suspension 1.5 mg  1.5 mg Per G Tube QAM Rufina Huff CNP        And    tacrolimus (GENERIC) suspension 1.5 mg  1.5 mg Per G Tube QPM Rufina Huff CNP        traZODone (DESYREL) half-tab 25 mg  25 mg Per J Tube At Bedtime Rufina Huff CNP   25 mg at 07/17/24 2106    valGANciclovir (VALCYTE) solution 450 mg  450 mg Per J Tube Once per day on Tuesday Saturday Rufina Huff CNP   450 mg at 07/16/24 2040    Vitamin D3 (CHOLECALCIFEROL) tablet 50 mcg  50 mcg Per J Tube Once per day on Monday Wednesday Friday Rufina Huff, CNP   50 mcg at 07/17/24 0747     Current Facility-Administered Medications   Medication Dose Route Frequency Provider Last Rate Last Admin    dextrose 10% infusion   Intravenous Continuous PRN Velez Reyes, German, MD   Stopped at  07/03/24 1600

## 2024-07-18 NOTE — PROGRESS NOTES
"Admitted/transferred from: [place]  Reason for admission/transfer: \"Difficulty breathing\"  Patient status upon admission/transfer: A&Ox4, VSS, satting 100% on prior vent settings, reports she was anxious during transfer to [place] (Hot in the ambulance, struggled with driving backwards and being claustrophobic)   Interventions: Resumed prior orders  Plan: Continue prior plan of care and [place] [facility] [place] again with anxiety pre-med  2 RN skin assessment: completed by deferred to night shift  Sexual Orientation and Gender Identity (SOGI) smartfom completed: Not Done  Result of skin assessment and interventions/actions:  Height, weight, drug calc weight: Not Done  Patient belongings (see Flowsheet - Adult Profile for details): With   MDRO education (if applicable):    "

## 2024-07-18 NOTE — CONSULTS
Consultation - Pulmonary Medicine  Kecia Blue,  1960, MRN [id number]    Acute respiratory failure (H) [J96.00]   Code status:  Full Code       Extended Emergency Contact Information  Primary Emergency Contact:  SateulaRanjan hernandez  Address: [address] [place] [address] [facility]  Home Phone: [phone]  Mobile Phone: [phone]  Relation: Spouse  Secondary Emergency Contact: Thoennes, Holly  Address: [address] [place] [address] [facility]  Home Phone: [phone]  Mobile Phone: [phone]  Relation: Daughter       Impressions:     Problems:  Acute on chronic hypoxic and hypercapnic respiratory failure s/p trach  6 Shiley tracheostomy placed 7/3/24  Oropharyngeal dysphagia status post PEG tube  Possible  on steroid taper   Post obstructive multi-lobar pneumonia 2/2 right bronchus stenosis s/p stent RMB to BI & bifurcating stent in RUL: completed antibiotics   S/p BSLT for ILD  Progressive CLAD, possible ACR  Immunosuppression: [place] goal level 8-10. 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 Given patient's acute distress upon arrival with hemodynamic instability, all in agreement transfer back to acute care in patient's best interest especially given limited resources at this time of day at LTAC  Deferring additional work up as ambulance in route             Chief Complaint Respiratory failure        HPI   I have been asked by Dr. Hardy to see Kecia Blue in consultation for trach and ventilator management.    Kecia Blue is a 63 year old year old female admitted to [facility] on 2024 for ongoing treatment of respiratory failure.    The referring facility is [facility].  Records from  that facility are available to assist in historical details.  Further history is provided by family at bedside.    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  The patient was admitted on 6/18/24 for progressive hypoxia with dyspnea and worsening hypercapnia in ED requiring intubation likely d/t post-obstructive PNA 2/2 right bronchus stenosis.  S/p IP bronch (6/20) with laser tissue debulking RMB stenosis, airway balloon dilation of RMB and BI, and placement of stent RMB to BI and bifurcating stent in RUL.  S/p CRRT (6/20-7/2), iHD resumed 7/4.  Recurrence of paroxysmal afib with RVR first noted 6/25. S/p trach with ENT on 7/3 and GJ tube with IR on 7/5.  Treating empirically for possible immune/inflammatory process vs organizing pneumonia (given GG changes on CT) with steroid burst/taper.  Gradual improvement in PST on ventilator.  Discharge to LTACH today for continued vent weaning.     Upon arrival to LTAC patient with chest pain, chest pressure, sob on same AC settings as previous facility. FiO2 increased to 100% given air hunger, SpO2 100% but remains in severe distress.  +nausea, refused Zofran.  Refused suction per RT.   Lungs auscultated, course on left and very coarse on right with minimal air movement.  Patient notes the chest pain/pressure and sob started when they placed her on the new vent for transport and worsened during her ride over here.  No improvement when place on our vent.  She is tachycardic 130s-140s, RR >35 on AC vent settings 320/18/+5, hypertensive 185/99 received hydralazine by primary team. ECG shows  Sinus Tach and incomplete RBBB. RT to give PRN nebulizer. VBG shows acute respiratory acidosis.         [facility] History  [unfilled]  [facility] History  Reviewed, and she  reports that she has never smoked. She has never used smokeless tobacco. She reports that she does not drink alcohol and does not use drugs.    Smoking history:   History   Smoking Status    Never   Smokeless Tobacco    Never    Family History  Reviewed, and family history includes Arthritis in her mother; Diabetes in her father; Hypertension in her mother; Pancreatic Cancer in her father.   Allergies  Allergies   Allergen Reactions    Isopropyl Alcohol Other (See Comments)     The alcohol burns the open areas on her skin when used as a skin prep for procedures    Vancomycin Other (See Comments)     Diffuse erythroderma with itching (improved with Benadryl) after receiving vancomycin over 1 hour. 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 Can use vancomycin in the future, but please give over slower infusion time (at least 2 hours) and premedicate with Benadryl.              Current Medications:   Current Facility-Administered Medications   Medication Dose Route Frequency Provider Last Rate Last Admin    acetylcysteine (MUCOMYST) 10 % nebulizer solution 4 mL  4 mL Inhalation 2 times daily Dulce Hardy MD        [START ON 7/19/2024] azithromycin (ZITHROMAX) half-tab 125 mg  125 mg Per J Tube Daily Dulce Hardy MD        chlorhexidine (PERIDEX) 0.12 % solution 15 mL  15 mL Mouth/Throat Q12H Dulce Hardy MD        fiber modular (BANATROL TF) packet 1 packet  1 packet Per Feeding Tube Q8H Dulce Hardy MD        fluticasone-vilanterol (BREO ELLIPTA) 100-25 MCG/ACT inhaler 1 puff  1 puff Inhalation Daily Dulce Hardy MD        heparin ANTICOAGULANT injection 5,000 Units  5,000 Units Subcutaneous Q8H Dulce Hardy MD         insulin aspart (NovoLOG) injection (RAPID ACTING)  1-12 Units Subcutaneous Q4H Dulce Hardy MD        insulin NPH injection 8 Units  8 Units Subcutaneous BID Dulce Hardy MD        levalbuterol (XOPENEX) neb solution 0.63 mg  0.63 mg Nebulization 4x Daily Dulce Hardy MD        Magnesium Cl-Calcium Carbonate 71.5-119 MG TBEC 2 tablet  2 tablet Oral Once per day on Sunday Tuesday Thursday Saturday Dulce Hardy MD        metoprolol (LOPRESSOR) suspension 25 mg  25 mg Per J Tube BID Dulce Hardy MD        montelukast (SINGULAIR) tablet 10 mg  10 mg Per J Tube At Bedtime Dulce Hardy MD        [START ON 7/19/2024] multivitamin RENAL (RENAVITE RX/NEPHROVITE) tablet 1 tablet  1 tablet Per J Tube Daily Dulce Hardy MD        pantoprazole (PROTONIX) 2 mg/mL suspension 40 mg  40 mg Per J Tube Daily Dulce Hardy MD        [START ON 7/19/2024] pantoprazole (PROTONIX) EC tablet 40 mg  40 mg Oral QAM AC Dulce Hardy MD        posaconazole (NOXAFIL) DR tablet TBEC 300 mg  300 mg Per Feeding Tube Daily Dulce Hardy MD        [Held by provider] predniSONE (DELTASONE) tablet 5 mg  5 mg Oral Daily Dulce Hardy MD        [START ON 7/19/2024] predniSONE (DELTASONE) tablet 50 mg  50 mg Per J Tube Daily Dulce Hardy MD        [START ON 7/19/2024] Prosource TF20 ENfit Compatibl EN LIQD (PROSOURCE TF20) packet 60 mL  1 packet Per J Tube Daily Dulce Hardy MD        QUEtiapine (SEROquel) tablet 25 mg  25 mg Per J Tube At Bedtime Dulce Hardy MD        ramelteon (ROZEREM) tablet 8 mg  8 mg Per J Tube At Bedtime Dulce Hardy MD        sodium chloride (NEBUSAL) 3 % neb solution 3 mL  3 mL Nebulization BID Dulce Hardy MD        sodium chloride (PF) 0.9% PF flush 10 mL  10 mL Intracatheter Q8H ServDulce amato MD        sulfamethoxazole-trimethoprim (BACTRIM) 400-80 MG per tablet 1 tablet  1 tablet Per J Tube Once per day on  Tuesday Thursday Saturday Dulce Hardy MD        [START ON 7/19/2024] tacrolimus (GENERIC) suspension 1.5 mg  1.5 mg Per G Tube QAM Dulce Hardy MD        And    tacrolimus (GENERIC) suspension 1.5 mg  1.5 mg Per G Tube QPM Dulce Hardy MD        traZODone (DESYREL) half-tab 25 mg  25 mg Per J Tube At Bedtime Dulce Hardy MD        [START ON 7/20/2024] valGANciclovir (VALCYTE) solution 450 mg  450 mg Per J Tube Once per day on Tuesday Saturday Dulce aHrdy MD        [START ON 7/19/2024] Vitamin D3 (CHOLECALCIFEROL) tablet 50 mcg  50 mcg Per J Tube Once per day on Monday Wednesday Friday Dulce Hardy MD              Review of Systems:  A 10-system review was obtained and is negative with the exception of the symptoms noted above. Physical Exam:  Temp:  [97.7  F (36.5  C)-98.1  F (36.7  C)] 98  F (36.7  C)  Pulse:  [] 108  Resp:  [18-38] 38  BP: ()/() 215/134  FiO2 (%):  [30 %] 30 %  SpO2:  [93 %-100 %] 97 %  [unfilled]  Ventilator settings:   Vent Mode: CMV/AC  (Continuous Mandatory Ventilation/ Assist Control)  FiO2 (%): 30 %  Resp Rate (Set): 18 breaths/min  Tidal Volume (Set, mL): 320 mL  PEEP (cm H2O): 5 cmH2O  Pressure Support (cm H2O): 10 cmH2O  Resp: (!) 38      EXAM:  Physical Exam  Gen: Acute distress on AC  HEENT: NT, trach midline/intact  CV: RRR, no m/g/r  Resp: Coarse on left, very coarse on right with minimal air movement; labored, return volume 200-280  Abd: soft, nontender, BS+  Skin: no rashes or lesions  Ext: no edema  Neuro: alert, following commands        Pertinent Labs:  Lab Results: personally reviewed.   Recent Labs   Lab 07/18/24  0657   WBC 2.5*   HGB 8.1*   HCT 25.2*        Recent Labs   Lab 07/18/24  0657 07/17/24  0607 07/16/24  0547   * 133* 131*   CO2 29 30* 24   BUN 63.3* 39.8* 86.1*     7-Day Micro Results       Collected Updated Procedure Result Status      07/16/2024 0547 07/16/2024 1621 Cytomegalovirus DNA by  PCR, Quantitative [[id number]]    (Abnormal)   Blood from Arm, Right    Final result Component Value Units   CMV DNA IU/mL <35 IU/mL   CMV DNA detected, less than 35 IU/mL   CMV log <1.5                    Venous Blood Gas  Recent Labs   Lab 07/18/24  1549   PHV 7.26*   PCO2V 72*   PO2V 29   HCO3V 27   LASHAUN 5.5*        Pertinent Radiology:  Radiology results: images and reports personally viewed; radiology read below   Chest CT  7/17/2024 9:37 AM     CLINICAL HISTORY: evaluate for worsening immune/inflammatory pathology  in setting of bilateral lung tranpslant in 2018     COMPARISON: 7/8/2024.     TECHNIQUE: CT imaging obtained through the chest without contrast.  Axial, coronal, and sagittal reconstructions and axial MIP reformatted  images are reviewed.      CONTRAST: None     FINDINGS:  Devices: Tracheostomy tube terminates in the mid thoracic trachea.  Percutaneous gastrojejunostomy tube in place with the balloon well  approximated within the stomach and the anterior abdominal wall in tip  terminating out of the field of view beyond the ligament of Treitz.  Intact medial and sternotomy wires.     Lungs: Status post bilateral lung transplantation. Azygos fissure. The  airway is patent. Patent right main stem bronchus stent which extends  into the proximal upper lobe and and proximal intermedius. No pleural  effusion or pneumothorax. Overall slightly improved appearance of the  multifocal groundglass confluent opacities throughout the bilateral  lower lobes with more dense consolidation at the bases. More  appreciable improvement in the lingula and right middle lobe with  relative sparing of the apices. Unchanged peripherally calcified  pleural plaque in the left posteromedial chest. Calcified granuloma in  the right lower lobe.     Mediastinum: The thyroid is unremarkable. Cardiac size is normal.  Ascending aorta measures 4.0 cm. Normal three-vessel branching  pattern. No pericardial effusion. No significant  coronary artery  calcium. Scattered atherosclerotic calcifications of the thoracic  aorta. No thoracic lymphadenopathy. Calcified right hilar lymph node.  Esophagus is normal in caliber.     Bones and soft tissues: No suspicious bone findings. Chronic  depression type vertebral body deformity at T5. Thickening of the left  breast skin is similar to prior.     Upper Abdomen: Limited evaluation of the upper abdomen. Heavy  calcification of the splenic artery. Splenomegaly. Calcified  gallstones.                                                                      IMPRESSION:   1. Overall improved appearance of the confluent groundglass opacities  primarily affecting the bilateral lower lobes. There is a more  appreciable improvement in the right middle lobe and lingula with  relative sparing of the apices.   2. Status post bilateral lung transplantation with stable support  devices.  3. Cholelithiasis.  4. Ectatic ascending aorta measuring up to 4.0 cm.  5. Atherosclerosis.     Other pertinent data:  Echocardiogram 6/19/2024  Interpretation Summary  Global and regional left ventricular function is normal with an EF of 55-60%.  Right ventricular function, chamber size, wall motion, and thickness are  normal.  The inferior vena cava cannot be assessed.  No pericardial effusion is present.  No significant valvular abnormalities present.  _______________________________________      Tracheostomy tube data:  Date of initial placement: 7/3/2024  Current tube - type: Shiley , size: 6       Extensive record review is performed.  Key information about patient is reviewed in detail.  The respiratory plan of care is discussed with RT.  This patient will be rounded on regularly while requiring mechanical ventilator support.  Please contact us with questions or concerns.    Total time spent on pt examination and coordination of care was 60min   Eyad Harding CNP  Pulmonary Medicine  [facility]  Pager [phone]  Office:  [phone]

## 2024-07-18 NOTE — SIGNIFICANT EVENT
Pt arrived to unit at approximately 1440. Pt c/o SOB, chest pressure, and nausea. BP elevated to 194/92 on RUE. Unable to recheck BP on LUE d/t dialysis fistula. Attempted rechecking BP on leg, but it does not appear accurate: Bps swung from 135/63 to 215/134. Hospitalist notified. EKG ordered and done. Zofran offered for nausea, and pt refused. BP remains elevated, and PRN Hydralazine given. TABITHA nurse assisting with settling patient and hand off report given to oncoming RN.      Rona Lozano, LOUISE 7/18/2024

## 2024-07-18 NOTE — H&P
MEDICAL ICU H&P  07/18/2024    Date of Hospital Admission: 7/18/2024  Date of ICU Admission: 7/18/2024  Reason for Critical Care Admission: Acute on chronic hypoxic respiratory failure  Date of Service (when I saw the patient): 07/18/2024    Date of Service (when I saw the patient): 07/18/2024     ASSESSMENT: Kecia Blue is a 63 year old female with PMH ILD s/p bilateral lung transplant (2018) c/b recurrent right bronchial stenosis s/p repeat balloon dilation/stenting, repeat opportunistic pneumonia (PJP 2021, aspergillus/stenotrophomonas 11/2023) and viremias (EBV, CMV), and ESRD 2/2 tacrolimus toxicity on HD who was admitted on 6/18/2024 for acute hypercapnic respiratory failure 2/2 tracheal stenosis and pneumonia.  Status post airway stent placement by IP on 6/20. Hospital course complicated by hypotension, delirium, and prolonged respiratory failure. Tracheostomy placement on 7/3 and PEGJ tube placement with IR on 7/5. Now persistent respiratory failure with minimal progress on PST, treating empirically for immune/inflammatory process, on steroid taper schedule. Attempted to discharge patient to [facility] this afternoon but patient returned to [facility] for acute on chronic hypoxic respiratory failure. Patient vitally stable upon transfer back to [facility] on previous vent settings with no change in physical exam from earlier this morning.      Changes today:  - Attempted to discharge patient to AC, returned to MICU for reported hypoxia requiring increased vent settings  - Continue BID SBT trials on pressure support 10/5 as tolerated     PLAN:     Neuro:  #Pain   - Acetaminophen 1 gram q8h prn        #Toxic metabolic encephalopathy, resolved  Ongoing lethargy in setting of sepsis and delirium in the setting of high-dose steroid therapy. Patient reports improved sleep on current medications. Following commands this morning.   - Continue quetiapine to 25 mg at bedtime   - Ramalteon at bedtime  - trazodone  25mg for sleep  - delirium precautions  - Do not disturb order overnight to promote sleep     #Anxiety  Patient with increased anxiety on transfer to LTAC and signs and symptoms during transfer consistent with severe anxiety. Patient vitally stable upon transfer back to [facility] with no change in physical exam from earlier this morning. Patient has been having anxiety regarding pressure support trials and weaning from the ventilator.   - Health Psychology consulted, seeing patient once per week while admitted- improved     Pulmonary:  #Acute on chronic hypoxic hypercapnic respiratory failure, improving  #concern for possible immune/inflammatory process  #HAP, Stenotrophomonas maltophilia, Enterococcus faecalis, and Candida guilliermondi complex  #Severe pulmonary edema  #Acute respiratory distress syndrome, resolved  Presented to the ED on 6/18/2024 with acute on chronic hypoxic hypercapnic respiratory failure. Transferred to MICU and intubated on overnight on 6/18. Workup significant for Stenotrophomonas, Enterococcus, and Candida pneumonia. Course was c/b progression to ARDS (6/21-22) with elevated plateau pressures.  CXR (6/26) with similar pulmonary opacities compared to 6/24, left > right.  The etiology of this groundglass opacity was unclear but ddx includes rejection vs. infection vs. volume overload. Tracheostomy placed on 7/3 to assist with ventilator weaning. CT chest w/o contrast 7/8: Waxing and waning mostly improved right lung consolidation now with diffuse groundglass organizing opacities mostly in the lower lobe an middle lobe more than the upper lobe. Slight improvement in the peribronchiolar consolidation in the left lower lobe but there is increased groundglass organization in the left upper lobe.    - s/p tracheostomy 7/3-> ENT removed sutures and perform trach dressing change 7/8  - Pulmonary toilet  - Mucomyst BID  - Hypertonic saline BID, alternate with mucomyst  - Albuterol neb QID  - Antibiotics,  as below  - Volume management with iHD  - Continue Daily SBT (AM and PM) on pressure support 10/5  - pulm transplant following  - CT chest 7/17: improved GGOs in B lower lobes. More appreciable improvement in right middle lobe and lingula with sparing of apices, cholithiasis (known), atherosclerosis, ectatic ascending aorta 4.0 cm      Vent Mode: CMV/AC  (Continuous Mandatory Ventilation/ Assist Control)  FiO2 (%): 30 %  Resp Rate (Set): 18 breaths/min  Tidal Volume (Set, mL): 320 mL  PEEP (cm H2O): 5 cmH2O  Pressure Support (cm H2O): 10 cmH2O  Resp: 18        #Recurrent right middle bronchus stenosis s/p dilation and stent (6/20/2024), stable  Has history (bronchoscopy 2/15/24) demonstrating narrowing of right mainstem transplant anastomosis and bronchus intermedius. CT chest (6/18/2024) and bronchoscopy (6/19/2024) demonstrated occlusion of right middle bronchus. With concern for post-obstructive pneumonia, interventional pulmonology was consulted, and completed bronchoscopy (6/20/2024) with tissue debulking, right middle bronchus balloon dilation to 11 mm, stent placement in right main bronchus, and bifurcating cast placement in right upper bronchus. IP pulm re-evaluated bronchial stents with BAL and noted that they were open.    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   - Prospera (7/3) pending  - DSA (7/3) pending  - PTA nebs  - Fluticasone-viilanterol (breo ellipta) every day - HOLD with respiratory infection  - Montelukast every day   - Immunosuppressants per Tx Pulm:   - PTA Tacrolimus (dosing per tx pulm)  - PTA Prednisone 5 mg daily   - PTA azathioprine - HOLD per Transplant pulmonology with repeat infections  - Peripheral  smear (7/9) pending for pancytopenia  - Antibiotic prophylaxis               - Bactrim MWF for PJP ppx (monitor need to adjust pending HD plan)  - CMV weekly monitoring (qTuesday)  - Azithromycin 125mg per pulmonary, continue to monitor QTc  - Continue VGCV ppx (renally dosed, monitor need to adjust pending HD plan) with tentative plan for 3 weeks beyond completion of stress dose steroids   - Hold PTA prednisone dose given burst dose  - Per pulm transplant, prednisone taper for 6 months as follows:    Date Daily dose (mg)   7/11 50   7/25 45   8/8 40   8/22 35   9/5 30   9/19 25   10/17 20   11/14 15   12/12 10   1/9 5            Cardiovascular:  #Septic shock, resolved  On 6/19/2024, developed sepsis (hypotension 80s/50s, tachypnea 24) in the setting of stenotrophomonas pneumonia/enterococcus faecium VRE UTI. Etiology of shock likely distributive iso sepsis; less likely hypovolemic (not pulling volumes with CRRT), medication-associated (weaned off of propofol gtt) or obstructive (low suspicion for PE, echo 6/19 without evidence of cardiac dysfunction). Occasional episodes of hypotension overnight, but self-resolving and have not occurred in a couple days.  - s/p SDS & pressors and midodrine, off of all support agents  - MAPs 70s-80s, stable     #Demand Ischemia, resolved    Presented with elevated troponin (peak 499). Not associated with chest pain or hypoxia. EKG without ST elevations/depressions or T wave inversions. Suspect demand ischemia in the setting of sepsis. Will continue to monitor symptoms and continuous telemetry. EKG 7/2 sinus rhythm with PACs.     #Paroxysmal A-Fib, improved  #Pulmonary Hypertension   #Transient arhythmia with palpitations  History of pulmonary hypertension (45 mmHg, echo 10/2023) in the setting of ILD and paroxysmal afib on PTA metoprolol tartrate BID. Not on anticoagulation for afib. Transient unspecified arhythmia overnight (7/9) with palpitations. EKG 7/9 sinus rhythm with fusion  complexes and known RBBB. Repeat EKG 7/10 showing sinus rhythm with no RBBB or fusion complexes. Patient given magnesium 1g and 20mEq K+ for K 3.3 this am. No further episodes of palpitations. Currently in sinus rhythm.  - continue PTA metoprolol tartrate 25mg BID  - CTM     #Prolonged Qtc, resolved  Repeat EKG 7/13 with QTC of 483.   - Azithromycin ppx restarted per pulm transplant  -Qtc 471 (7/18)  - CTM     GI/Nutrition:  #Nutrition  PEG-J placed by IR on 7/5, continue tube feeds.   - Nutrition consulted  - Tube feeds 30ml/hr; at goal     #Diarrhea, resolved  On 6/21, had 8 bowel movements. Occurred in the setting of scheduled bowel regimen and starting new antibiotics (meropenem, levofloxacin). C diff negative.   - Holding PRN bowel regimen for loose stools  - Miralax PRN   - Senna prn  - formula changes made per nutrition        #Cholelithiasis with gallbladder wall thickening, resolved  During admission patient found to have up trending LFTs and fever in setting of GB wall thickening on CT abdomen/pelvis, now resolved. MRCP completed showing borderline dilated CBD up to 1.1 cm, no significant intrahepatic biliary dilation, no evidence of choledocholithiasis, no evidence of acute cholecystitis.     Renal/Fluids/Electrolytes:  #ESRD on iHD (M,W,F)  History of ESRD 2/2 Tacrolimus toxicity on HD (MWF). With soft blood pressures, placed RIJ (6/20/2024) and started CRRT (6/20/2024). US of AVF 7/5: arterial inflow patent without inflow stenosis, normal diameter of anastamosis, venous outflow elevated velocity at subclavian- suggestive of recurrent stenosis in venous outflow (area of prior angioplasty in March). IR consulted who noted that IR fistulogram not required at that time, and was able to run iHD on 7/6 off her fistula. Currently having some swelling in same arm that fistula is placed in  - Nephrology consulted and following for ESRD  - will follow recommendations for need of HD  - Renally-dosed medications  -  BMP daily  - I &O: net +895ml yesterday  - HD this afternoon, (on T,TH,S schedule currently)  - Replete K 3.2 with 20mEq KCL po        Endocrine:  #Risk for hyperglycemia with high dose steroids  Stress dose steroids discontinued, resume PTA prednisone 5 mg daily. Blood sugars have remained in appropriate range.  - NPH 8U BID  - CTM     ID:  #HAP, Stenotrophomonas maltophilia, Enterococcus faecalis, Aspergillus, and Candida guilliermondi complex  Presented to the ED on 6/18/2024 with SOB and hypercapnic respiratory failure. Found on bronchoscopy (6/19) to have RML obstruction by narrowing bronchus intermedius as well as copious mucopurulent secretions/mucus plugging. Previously treated for Stenotrophonomas/aspergillus pneumonia in 11/2023 with subsequent sputum culture (2/2024) negative for both Stenotrophonomas/Aspergillus. Etiology likely repeat Stenotrophomonas infection vs opportunistic infection from colonized microbe.   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                - Continue posaconazole 300 mg/day                - Complete 14 days of micafungin 150 mg/day today (6/25-7/9)- discontinued   - Continue VGCV prophylaxis  - Weekly CMV VL (Tuesdays, next 7/9), last was 39 (7/2)         - Azithromycin per pulmonary (holding due to QTC, but will follow up)  - TMP/SMX SS daily for PJP PPx (for life given h/o PJP)  - Awaiting susceptibilities on Aspergillus per transplant  ID  - Present antibiotics               - Continue posaconazole 300 mg/day per pulm transplant (6/25 to present) for candida guillermondii and asergillus ochraceus on prior BAL  - Past antibiotics               - s/p micafungin 150 mg/day (6/25-7/9)  - s/p IV minocycline 100mg BD x 14 days total (6/20-7/5)- for stenotrophamonas  - PO linezolid 600 mg BID x 10 days (6/25-7/5)- for VRE in urine and BAL cultures  - S/p ceftazidime (6/25-6/28)                - S/p vancomycin (6/18-6/20)               - S/p zosyn (6/18-21)  - S/p Daptomycin (6/21-6/23)  - S/p Meropenem (6/21-6/24)  - S/p Levofloxacin (6/21-6/23)     - BAL fluid (7/3): pink, hazy, cell counts normal range, fluid sent for viral, bacterial and fungal cultures negative to date, cytology negative for malignancy and organisms; preliminary AFB negative     #Pyuria, Enterococcus faecium VRE and  ESBL E. Coli   Asymptomatic. With progressive IV pressor needs, obtained broad infectious workup which demonstrated UCx (6/19/2024) with Enterococcus faecium VR and ESBL E. Coli. S/p Daptomycin (6/21-6/23) and Meropenem (6/21-6/24).     #Hx Aspergillus PNA (11/2023)  #Hx PJP PNA (1/2021)  #Hx CMV viremia, recurrent  #Hx EBV viremia  - Transplant ID consult, appreciate recs  PTA posaconazole (Treatment for hx of aspergillus PNA)  PTA azithromycin 250mg qd (CLAD ppx) (holding)  PTA bactrim (PJP ppx)  -CMV PCR 7/16 positive but <35     Hematology:    #Acute on Chronic Normocytic Anemia, stable  #Thrombocytopenia, chronic, improving  History of chronic anemia in the setting of kidney disease (baseline Hgb 10-11). Upon admission, Hgb 9. May be bone marrow suppression in the setting of chronic illness; potential contribution by blood loss with CRRT filter changes (~150-200c). Peripheral smear (6/18/2024) without evidence of schistocytes. Hemoglobin low today at 7.1, no signs of active bleeding.   - continue to monitor with CBC but improving & stable     Musculoskeletal:  - PT  / OT consult - encourage ambulation as tolerated     Skin:  - Sacral Blanching- off load with frequent turns in bed     General Cares/Prophylaxis:    DVT Prophylaxis: subcutaneous heparin  GI Prophylaxis: PPI  Restraints: None  Family Communication:  updated bedside  Code Status: Full Code     Lines/tubes/drains:  - PIV x2  - Tracheostomy  - PEG/J     Disposition:  - Medical ICU     Patient seen and findings/plan discussed with medical ICU staff, Dr. Sanches.     Edin Davis MD  Internal Medicine Resident, PGY-1        Clinically Significant Risk Factors Present on Admission        # Hypokalemia: Lowest K = 3.2 mmol/L in last 2 days, will replace as needed     # Hypomagnesemia: Lowest Mg = 1.6 mg/dL in last 2 days, will replace as needed         # Acute Respiratory Failure: based on blood gas results.  Continue supplemental oxygen as needed   # Anemia: based on hgb <11        #Precipitous drop in Hgb/Hct: Lowest Hgb this hospitalization: 8.1 g/dL. Will continue to monitor and treat/transfuse as appropriate.         # Financial/Environmental Concerns:        # Anemia: based on hgb <11             -----------------------------------------------------------------------    HISTORY PRESENTING ILLNESS:Kecia Blue is a 63 year old female with PMH ILD s/p bilateral lung transplant (2018) c/b recurrent right bronchial stenosis s/p repeat balloon dilation/stenting, repeat opportunistic pneumonia (PJP 2021, aspergillus/stenotrophomonas 11/2023) and viremias (EBV, CMV), and ESRD 2/2 tacrolimus toxicity on HD who was admitted on 6/18/2024 for acute hypercapnic respiratory failure 2/2 tracheal stenosis and pneumonia.  Status post airway stent placement by IP on 6/20. Hospital course complicated by hypotension, delirium, and prolonged respiratory failure. Tracheostomy placement on 7/3 and PEGJ tube placement with IR on 7/5. Now persistent respiratory failure with minimal progress on PST, treating empirically  for immune/inflammatory process, on steroid taper schedule.     Attempted to discharge patient to [facility] this afternoon but patient returned to [facility] for acute on chronic hypoxic respiratory failure. According to patient's , Ranjan, patient was very anxious during transfer to [facility] and he was unable to calm her.     According to [facility] records, patient arrived in respiratory distress and had complaints of nausea, chest pressure and dyspnea. Respiratory rate was in the 40s with SpO2 in low 80s, tachycardic to 130s and hypertensive with /91. She was given 0.5 mg of ativan IV and 10mg of hydralazine. EKG was performed showing sinus tachycardia and incomplete RBBB, which is same as previous EKG.     Pt currently denies CP, SOB, lightheadedness, dizziness, nausea, vomiting, pain. She reports breathing is much improved.      REVIEW OF SYSTEMS: Negative except for above.      PAST MEDICAL HISTORY:   Past Medical History:   Diagnosis Date    Acute on chronic respiratory failure with hypoxia (H) 02/21/2018    Anisocoria     Antisynthetase syndrome (H24) 2014    Anxiety     Aspergillus (H)     Aspergillus pneumonia (H) 11/20/2020    Benign essential hypertension     C. difficile colitis     Cytomegalovirus (CMV) viremia (H)     Dermatomyositis (H)     Dysplasia of cervix, low grade (ESTRADA 1)     EBV (Franklin-Barr virus) viremia     ESRD (end stage renal disease) on dialysis (H)     ILD (interstitial lung disease) (H)     Lung replaced by transplant (H)     Osteopenia     Paroxysmal atrial fibrillation (H)     Pneumocystis jiroveci pneumonia (H)     PONV (postoperative nausea and vomiting)     Post-menopause     Pulmonary hypertension (H)     Raynaud's disease     Seronegative rheumatoid arthritis (H)      SURGICAL HISTORY:  Past Surgical History:   Procedure Laterality Date    BRONCHOSCOPY (RIGID OR FLEXIBLE), DIAGNOSTIC N/A 04/10/2018    Procedure: COMBINED BRONCHOSCOPY (RIGID OR FLEXIBLE), LAVAGE;;  Surgeon:  Mariposa Donohue MD;  Location: [facility]    BRONCHOSCOPY (RIGID OR FLEXIBLE), DIAGNOSTIC N/A 12/23/2020    Procedure: BRONCHOSCOPY, WITH BRONCHOALVEOLAR LAVAGE;  Surgeon: Uri Bass MD;  Location: [facility]    BRONCHOSCOPY (RIGID OR FLEXIBLE), DIAGNOSTIC N/A 05/26/2022    Procedure: BRONCHOSCOPY, WITH BRONCHOALVEOLAR LAVAGE;  Surgeon: Uri Bass MD;  Location: [facility]    BRONCHOSCOPY (RIGID OR FLEXIBLE), DIAGNOSTIC N/A 08/17/2023    Procedure: BRONCHOSCOPY, WITH BRONCHOALVEOLAR LAVAGE;  Surgeon: Esau Bruno MD;  Location: [facility]    BRONCHOSCOPY (RIGID OR FLEXIBLE), DIAGNOSTIC N/A 11/01/2023    Procedure: BRONCHOSCOPY, WITH BRONCHOALVEOLAR LAVAGE;  Surgeon: Beto Magaña DO;  Location: [facility]    BRONCHOSCOPY (RIGID OR FLEXIBLE), DILATE BRONCHUS / TRACHEA N/A 10/11/2018    Procedure: BRONCHOSCOPY (RIGID OR FLEXIBLE), DILATE BRONCHUS / TRACHEA;  Flexible And Rigid Bronchoscopy and Dilation;  Surgeon: Wilber Lin MD;  [facility]: [facility]    BRONCHOSCOPY (RIGID OR FLEXIBLE), DILATE BRONCHUS / TRACHEA N/A 11/01/2023    Procedure: Bronchoscopy (Rigid Or Flexible), Dilate Bronchus / Trachea;  Surgeon: Beto Magaña DO;  Location: [facility]    BRONCHOSCOPY FLEXIBLE N/A 03/13/2018    Procedure: BRONCHOSCOPY FLEXIBLE;  Flexible Bronchoscopy ;  Surgeon: Gissell Sanchez MD;  Location: [facility]    BRONCHOSCOPY FLEXIBLE N/A 05/09/2018    Procedure: BRONCHOSCOPY FLEXIBLE;;  Surgeon: Wilber Lin MD;  Location: [facility]    BRONCHOSCOPY FLEXIBLE AND RIGID N/A 09/10/2018    Procedure: BRONCHOSCOPY FLEXIBLE AND RIGID;  Flexible and Rigid Bronchoscopy with Balloon Dilation, tissue debulking with cryo, and Right mainstem bronchus stent placement;  Surgeon: Wilber Lin MD;  [facility]    BRONCHOSCOPY RIGID N/A 06/06/2018    Procedure: BRONCHOSCOPY RIGID;;  Surgeon: Lopez Macias MD;  Location: [facility]    BRONCHOSCOPY, DILATE BRONCHUS, STENT BRONCHUS, COMBINED N/A 06/11/2018     Procedure: COMBINED BRONCHOSCOPY, DILATE BRONCHUS, STENT BRONCHUS;  Flexible Bronchoscopy, Balloon Dilation, Bronchial Washings;  Surgeon: Wilber Lin MD;  [facility]    BRONCHOSCOPY, DILATE BRONCHUS, STENT BRONCHUS, COMBINED Right 07/10/2018    Procedure: COMBINED BRONCHOSCOPY, DILATE BRONCHUS, STENT BRONCHUS;  Flexible Bronchoscopy, Balloon Dilation, Bronchial Washings  ;  Surgeon: Wilber Lin MD;  [facility]    BRONCHOSCOPY, DILATE BRONCHUS, STENT BRONCHUS, COMBINED N/A 08/02/2018    Procedure: COMBINED BRONCHOSCOPY, DILATE BRONCHUS, STENT BRONCHUS;  Flexible Bronchoscopy, Bronchial Washings, Balloon Dilation;  Surgeon: Wilber Lin MD;  [facility]    BRONCHOSCOPY, DILATE BRONCHUS, STENT BRONCHUS, COMBINED N/A 08/20/2018    Procedure: COMBINED BRONCHOSCOPY, DILATE BRONCHUS, STENT BRONCHUS;  Flexible Bronchoscopy, Balloon Dilation;  Surgeon: Wilber Lin MD;  [facility]    BRONCHOSCOPY, DILATE BRONCHUS, STENT BRONCHUS, COMBINED N/A 10/29/2018    Procedure: Flexible Bronchoscopy, Balloon Dilation, Stent Revision, Airway Examination And Therapeutic Suctioning, Cyro Tumor Debulking;  Surgeon: Wilber Lin MD;  [facility]    BRONCHOSCOPY, DILATE BRONCHUS, STENT BRONCHUS, COMBINED N/A 11/20/2018    Procedure: Rigid Bronchoscopy, Stent Removal and dilitation;  Surgeon: Wilber Lin MD;  [facility]    BRONCHOSCOPY, DILATE BRONCHUS, STENT BRONCHUS, COMBINED N/A 12/14/2018    Procedure: Flexible And Rigid Bronchoscopy, Balloon Dilation, Bronchial Washing;  Surgeon: Wilber Lin MD;  [facility]    BRONCHOSCOPY, DILATE BRONCHUS, STENT BRONCHUS, COMBINED N/A 01/17/2019    Procedure: Flexible And Rigid Bronchoscopy, Balloon Dilation.;  Surgeon: Wilber Lin MD;  [facility]    BRONCHOSCOPY, DILATE BRONCHUS, STENT BRONCHUS, COMBINED N/A 03/07/2019    Procedure: Flexible and Rigid Bronchoscopy, Bronchial  Washing, Balloon Dilation;  Surgeon: Wilber Lin MD;  [facility]    BRONCHOSCOPY, DILATE BRONCHUS, STENT BRONCHUS, COMBINED N/A 06/06/2019    Procedure: Rigid and Flexible Bronchoscopy, Balloon Dilation;  Surgeon: Wilber Lin MD;  [facility]    BRONCHOSCOPY, DILATE BRONCHUS, STENT BRONCHUS, COMBINED N/A 10/11/2019    Procedure: Flexible and Rigid Bronchoscopy, Balloon Dilation, Bronchoalveolar Lagave;  Surgeon: Wilber Lin MD;  [facility]    BRONCHOSCOPY, DILATE BRONCHUS, STENT BRONCHUS, COMBINED N/A 02/19/2021    Procedure: BRONCHOSCOPY, flexible, airway dilation, and bronchial wash;  Surgeon: Wilber Lin MD;  [facility]    BRONCHOSCOPY, DILATE BRONCHUS, STENT BRONCHUS, COMBINED N/A 04/09/2021    Procedure: BRONCHOSCOPY, flexible and rigid, Airway suctioning;  Surgeon: Mati Norris MD;  [facility]    BRONCHOSCOPY, DILATE BRONCHUS, STENT BRONCHUS, COMBINED N/A 10/17/2023    Procedure: RIGID, flexible bronchoscopy with airway dilation;  Surgeon: Preet Patel MD;  [facility]    BRONCHOSCOPY, DILATE BRONCHUS, STENT BRONCHUS, COMBINED N/A 6/20/2024    Procedure: RIGID AND FLEXIBLE BRONCHOSCOPY, BALLOON DILATION, TISSUE DEBULKING WITH C02 LASER, STENT PLACEMENT;  Surgeon: Juana Baugh MD;  [facility]    CV RIGHT HEART CATH MEASUREMENTS RECORDED N/A 03/10/2020    Procedure: CV RIGHT HEART CATH;  Surgeon: Wai Garcia MD;  Location:  HEART CARDIAC CATH LAB    ENT SURGERY      tonsillectomy as a child    ESOPHAGOSCOPY, GASTROSCOPY, DUODENOSCOPY (EGD), COMBINED N/A 10/29/2018    Procedure: COMBINED ESOPHAGOSCOPY, GASTROSCOPY, DUODENOSCOPY (EGD) with biopsies and polypectomy;  Surgeon: Chente Bloom MD;  [facility]    INSERT EXTRACORPORAL MEMBRANE OXYGENATOR ADULT (OUTSIDE OR) N/A 02/27/2018    Procedure: INSERT EXTRACORPORAL MEMBRANE OXYGENATOR ADULT (OUTSIDE OR);  INSERT EXTRACORPORAL MEMBRANE OXYGENATOR  ADULT (OUTSIDE OR) ;  Surgeon: Toby Hernandez MD;  Location[facility]    IR CVC TUNNEL PLACEMENT > 5 YRS OF AGE  10/25/2019    IR DIALYSIS FISTULOGRAM LEFT  03/02/2021    IR DIALYSIS FISTULOGRAM LEFT  11/02/2023    IR DIALYSIS FISTULOGRAM LEFT  03/05/2024    IR DIALYSIS MECH THROMB, PTA  03/02/2021    IR DIALYSIS PTA  11/02/2023    IR FLUORO 0-1 HOUR  05/07/2021    IR GASTRO JEJUNOSTOMY TUBE PLACEMENT  02/16/2021    IR GASTRO JEJUNOSTOMY TUBE PLACEMENT  7/5/2024    IR PARACENTESIS  01/08/2020    IR THORACENTESIS  09/13/2019    IR TRANSCATHETER BIOPSY  01/08/2020    LASER CO2 BRONCHOSCOPY N/A 04/30/2021    Procedure: Flexible and Rigid Bronchoscopy and Tissue Debulking with CO2 Laser Assistance;  Surgeon: Mati Norris MD;  [facility]    LASER CO2 BRONCHOSCOPY N/A 06/11/2021    Procedure: BRONCHOSCOPY, Flexible and Rigid Bronchoscopy, Tissue Debulking with cryo Assistance, airway dilation,;  Surgeon: Mati Norris MD;  [facility]    LASER CO2 BRONCHOSCOPY N/A 09/16/2021    Procedure: BRONCHOSCOPY, flexible and rigid, CO2 Laser Debulking;  Surgeon: Mati Norris MD;  [facility]    LASER CO2 BRONCHOSCOPY N/A 02/11/2022    Procedure: flexible, rigid bronchoscopy, tissue debulking, airway dilation, co2 laser, bronchoalveolar lavage;  Surgeon: Juana Baugh MD;  [facility]    LASER CO2 BRONCHOSCOPY Right 11/17/2023    Procedure: flexible bronchosocopy, tissue/tumor debulking, using CO2 laser, mitomycin application and argon plasma coagulation;  Surgeon: Mati Norris MD;  [facility]    LASER CO2 BRONCHOSCOPY N/A 02/15/2024    Procedure: Flexible, Rigid Bronchoscopy,Tumor/Tissue debulking using Carbon Dioxide (CO2) laser; balloon dilation;  Surgeon: Wilber Lin MD;  [facility]: [facility]    MIDLINE SINGLE LUMEN PLACEMENT Right 06/19/2024    3FR SL midline.    no prior surgery      REMOVE EXTRACORPORAL MEMBRANE OXYGENATOR ADULT N/A 03/03/2018    Procedure: REMOVE  EXTRACORPORAL MEMBRANE OXYGENATOR ADULT;  Removal of Right Femoral Venous and Right Axillary Arterial Extracorporeal Membrane Oxygenator;  Surgeon: Toby Hernandez MD;  [facility]    TRACHEOSTOMY N/A 7/3/2024    Procedure: Tracheostomy;  Surgeon: Cris Liu MD;  [facility]    TRANSPLANT LUNG RECIPIENT SINGLE X2 Bilateral 2018    Procedure: TRANSPLANT LUNG RECIPIENT SINGLE X2;  Median Sternotomy, Extracorporeal Membrane Oxygenator, bilateral sequential lung transplant;  Surgeon: Toby Hernandez MD;  Location[facility]     SOCIAL HISTORY:  Social History     Socioeconomic History    Marital status:    Tobacco Use    Smoking status: Never    Smokeless tobacco: Never   Substance and Sexual Activity    Alcohol use: No     Alcohol/week: 1.0 standard drink of alcohol     Types: 1 Glasses of wine per week    Drug use: No   Other Topics Concern    Parent/sibling w/ CABG, MI or angioplasty before 65F 55M? No   Social History Narrative    3/6/2019 - Lives with . Has three daughters. Four grandchildren (two ). No pets. Travelled previously to [facility]. Has visited [place] several times.      Social Determinants of Health     Financial Resource Strain: Low Risk  (2022)    Received from [facility], [facility]    Overall Financial Resource Strain (CARDIA)     Difficulty of Paying Living Expenses: Not hard at all   Food Insecurity: No Food Insecurity (2022)    Received from [facility], [facility]    Hunger Vital Sign     Worried About Running Out of Food in the Last Year: Never true     Ran Out of Food in the Last Year: Never true   Transportation Needs: No Transportation Needs (2022)    Received from [facility], [facility]    PRAPARE - Transportation     Lack of Transportation (Medical): No     Lack of Transportation  (Non-Medical): No   Physical Activity: Inactive (9/20/2022)    Received from [facility], [facility]    Exercise Vital Sign     Days of Exercise per Week: 0 days     Minutes of Exercise per Session: 0 min   Stress: No Stress Concern Present (9/20/2022)    Received from [facility], [facility]    [employer] - Occupational Stress Questionnaire     Feeling of Stress : Not at all   Social Connections: Moderately Integrated (9/20/2022)    Received from [facility], [facility]    Social Connection and Isolation Panel [NHANES]     Frequency of Communication with Friends and Family: Twice a week     Frequency of Social Gatherings with Friends and Family: Twice a week     Attends Temple Services: More than 4 times per year     Active Member of Clubs or Organizations: No     Marital Status:    Interpersonal Safety: Not At Risk (4/15/2024)    Received from [facility]    Intimate Partner Violence     Are you in a relationship where you are physically hurt, threatened and/or made to feel afraid?: No   Housing Stability: Unknown (9/20/2022)    Received from [facility], [facility]    Housing Stability     In the last 12 months, was there a time when you were not able to pay the mortgage or rent on time?: No     In the last 12 months, was there a time when you did not have a steady place to sleep or slept in a shelter (including now)?: No     FAMILY HISTORY:   Family History   Problem Relation Age of Onset    Hypertension Mother     Arthritis Mother     Pancreatic Cancer Father     Diabetes Father      ALLERGIES:   Allergies   Allergen Reactions    Isopropyl Alcohol Other (See Comments)     The alcohol burns the open areas on her skin when used as a skin prep for procedures    Vancomycin Other (See  Comments)     Diffuse erythroderma with itching (improved with Benadryl) after receiving vancomycin over 1 hour. 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 Can use vancomycin in the future, but please give over slower infusion time (at least 2 hours) and premedicate with Benadryl.     MEDICATIONS:  No current facility-administered medications for this encounter.       PHYSICAL EXAMINATION:  Temp:  [97.7  F (36.5  C)-98.1  F (36.7  C)] 98.1  F (36.7  C)  Pulse:  [] 133  Resp:  [18-38] 38  BP: ()/() 185/99  FiO2 (%):  [30 %] 30 %  SpO2:  [93 %-100 %] 100 %  General: Sitting up in bed, NAD   HEENT: Atraumatic, moist mucous membranes, trach in place c/d/I.  Pulm/Resp: Ongoing coarse crackles diffusely improved compared to prior exam. Non-labored breathing on vent support.   CV: RRR, no m/r/g. No peripheral edema  Abdomen: Soft, non-distended, non-tender, bowel sounds present. PEG/J in place c/d/I.  Ext: Trace bilateral LE edema, pulses 2+ radial, pedal; stable 1+ pitting edema L forearm distal to AVF, no signs of bleeding  Incisions/Skin: No rashes or lesions  Neuro: Awake and alert x 3, follows commands and nods and shakes head to questions, moving all extremities equally    LABS: Reviewed.   Arterial Blood Gases   No lab results found in last 7 days.  Complete Blood Count   Recent Labs   Lab 07/18/24  1549 07/18/24  0657 07/17/24  0607 07/16/24  0547 07/15/24  0641   WBC  --  2.5* 2.4* 2.7* 2.5*   HGB 10.4* 8.1* 8.0* 7.9* 7.8*   PLT  --  152 153 167 150     Basic Metabolic Panel  Recent Labs   Lab 07/18/24  1549 07/18/24  1248 07/18/24  1117 07/18/24  0831 07/18/24  0657 07/17/24  0744 07/17/24  0607 07/16/24  0831 07/16/24  0547 07/15/24  0856 07/15/24  0641     --   --   --  132*  --  133*  --  131*  --  133*   POTASSIUM 4.5  --   --   --  3.4  --  3.2*  --  3.1*  --  3.1*   CHLORIDE  --   --   --   --  92*  --  93*  --   92*  --  95*   CO2  --   --   --   --  29  --  30*  --  24  --  25   BUN  --   --   --   --  63.3*  --  39.8*  --  86.1*  --  59.7*   CR  --   --   --   --  2.42*  --  1.65*  --  2.84*  --  2.23*   * 194* 170* 148* 128*   < > 150*   < > 166*   < > 157*    < > = values in this interval not displayed.     Liver Function Tests  No lab results found in last 7 days.  Coagulation Profile  No lab results found in last 7 days.    IMAGING:  No results found for this or any previous visit (from the past 24 hour(s)).

## 2024-07-18 NOTE — PROGRESS NOTES
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  The patient was admitted on 24 for progressive hypoxia with dyspnea and worsening hypercapnia in ED requiring intubation likely d/t post-obstructive PNA 2/2 right bronchus stenosis.  S/p IP bronch () with laser tissue debulking RMB stenosis, airway balloon dilation of RMB and BI, and placement of stent RMB to BI and bifurcating stent in RUL.  S/p CRRT (-), iHD resumed .  Recurrence of paroxysmal afib with RVR first noted . S/p trach with ENT on 7/3 and GJ tube with IR on .  Treating empirically for possible immune/inflammatory process vs organizing pneumonia (given GG changes on CT) with steroid burst/taper.  Gradual improvement in PST on ventilator.  Discharge to LTACH today for continued vent weaning.     Discharge recommendations:  - Continue nebs per IP for stent  - Prednisone taper schedule over 6 months as below  - Azithromycin resumed today at lower dose, repeat EKG in 3-4 days  - Tacrolimus level today subtherapeutic, dose increased, follow levels qMTh  - Susceptibilities requested on Aspergillus from  bronch culture per ID, pending  - Posaconazole chronically per transplant ID, follow up ~6-8 weeks      Septic shock, Resolved:  Acute on chronic hypoxic/hypercapneic  respiratory failure:  Post-obstructive multi-lobar PNA:  Right bronchial stenosis with RML collapse:   Possible : Admitted with 2 weeks of progressive hypoxia, dyspnea, congested cough, and fatigue.  Chronic hypoxia with 2L NC, increased from 3 to 4L over this time with acute decompensation on day of admission associated with hypercapnia. Intubated in the ED. Negative comprehensive ID workup on admission. Procal mildly elevated at 0.24 initially (then elevated to 1.77 6/20), LA normal, but febrile to 100.7.  TTE on admission grossly normal.  CT with RUL/RLL consolidative opacities, RML collapse, and narrowing of BI and distal RLL bronchus.  Bronch (6/19) with severe stenosis starting at right anastomosis, inspection with smaller scope revealing for extensive RLL mucous plugging.  S/p IP bronch (6/20) with laser tissue debulking RMB stenosis, airway balloon dilation of RMB and BI, and placement of stent RMB to BI and bifurcating stent in RUL.  Respiratory cultures with Steno, VSE, VRE, Aspergillus ochraceus, and Elidia guilliermondii.  Chest CT (6/29) with multifocal panlobar opacities and small bibasilar pleural effusions.  S/p trach with ENT on 7/3 and GJ tube with IR on 7/5.  S/p stress dose steroids 6/21-7/5.  Chest CT (7/8) with waxing and waning mostly improved right lung consolidation and new diffuse GGO >BLL, increased GGO in KONRAD.  Steroid burst started 7/11 with notable improvement in PST and chest tightness.  Chest CT (7/17) with improvement in bibasilar predominant GGO (personally reviewed).  - Nebs: levalbuterol QID, Mucomyst 10% BID alternating with 3% HTS (6/22), continuing per IP for stent  - Prednisone taper as follows:  Date Daily dose (mg)   7/11 50   7/25 45   8/8 40   8/22 35   9/5 30   9/19 25   10/17 20   11/14 15   12/12 10   1/9 5      S/p bilateral sequential lung transplant (BSLT) for ILD:   Progressive CLAD:   Possible ACR: Most recent OP visit in February.  DSA negative 7/3 (last  positive noted 2018).  Repeat ImmuKnow (6/19) 87, very low but IST adjustment deferred per Dr. Graham given acuity and steroids (after ImmuKnow level).  Repeat Prospera remains high at 2.26 on 7/3 possibly related to recent infection, now on steroids.  ImmuKnow remains low at 89 on 7/8, defer IST adjustment at this time.  - PTA Singulair  - Breo inhaler (on hold with trach)  - Azithromycin 125 mg daily (resumed 7/18 at lower dose, prior held 7/10 for prolonged QTc), repeat EKG in 3-4 days  - Prolonged steroid taper as above     Immunosuppression:  On 2-drug IST since November d/t leukopenia and recurrent infections  - Tacrolimus 1 mg qAM / 1.5 mg qPM (increased 7/14) to be via G tube exclusively (has been likely receiving via J tube).  Goal level 8-10.  Level today 6, dose increased to 1.5 mg BID.  Follow levels qMTh.  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  S/p ABX as above.     Aspergillus ochraceus:  H/o Aspergillus empyema: S/p empyema drainage and ampho B instillation.  Previously on voriconazole, transitioned to posaconazole and managed by transplant ID as OP with last visit 3/13.  Calcified fungal elements remain with additional recurrent effusion (likely associated with fluid disturbances r/t iHD), but surgical intervention previously deferred d/t risk.  Posaconazole level 2.5 on 6/19.  Susceptibility testing done on fungal bronch culture 6/19 per transplant ID.  S/p micafungin 150 mg daily (6/25-7/9) per transplant ID.  - Susceptibilities requested on Aspergillus from 6/19 bronch culture per ID, pending  - Posaconazole 300 mg daily chronically per transplant ID (signed off 7/9) with plan for transplant ID follow up ~6-8 weeks      H/o CMV viremia: Recurrent CMV viremia historically.  VGCV ppx most recently stopped 4/12.  Recent CMV low positive at 46  (6/12), <35 (6/18), 39 (6/25, 7/2) and now negative since 7/9.  - CMV weekly monitoring (qTuesday) and VGCV ppx (6/24, renally dosed) with increased steroid courses, duration TBD     EBV viremia: S/p rituximab 12/13/23 x1 dose.  Most recent EBV negative 6/18.     Dilated CBD:  Suspected acute cholecystitis: MRCP (6/29) with CBD dilation with stones and cholelithiasis without cholecystitis.  GI signed off 6/30, recommending general surgery consult if RUQ pain recurs.     ESRD on iHD: Kidney evaluation started as OP.  On qMWF iHD schedule, no missed sessions.  Transitioned to CRRT on 6/20, stopped 7/2 and iHD resumed 7/4.  Management per renal and MICU.     Pancytopenia: WBC 2.2-3.2 since 7/7.  Platelets down to 45 on 7/9.  On VGCV as above.  Also intermittently requiring pRBC.  Peripheral smear (7/9) with moderate normochromic, normocytic anemia with no increase in erythrocyte regeneration, slight leukopenia, marked thrombocytopenia with normal platelet morphology, and no overt evidence of dysplasia.     We appreciate the excellent care provided by the MICU team.  Recommendations communicated via in person rounding and this note.  Will continue to follow along closely, please do not hesitate to call with any questions or concerns.    Extensive conversation with pt. and spouse today at bedside regarding current POC and future POC after discharge to LTACH.     Patient discussed with Dr. James.    Hina Huff, DNP, APRN, CNP  Inpatient Nurse Practitioner  Pulmonary CF/Transplant     Subjective & Interval History:     PS for 3.5 hours in the morning at 10/5/30, then for just over 3.5 hours in the afternoon.  Otherwise on full vent support with minimal settings, scant secretions via ETT.  Intermittent afib overnight.    Review of Systems:     C: No fever, no chills  INTEGUMENTARY/SKIN: No rash or obvious new lesions  ENT/MOUTH: No nasal congestion or drainage  RESP: See interval history  CV: No chest pain, no  "palpitations, no peripheral edema, no orthopnea  GI: + Occ nausea, no vomiting, no change in stools, no reflux symptoms  : No dysuria  MUSCULOSKELETAL: No myalgias, no arthralgias  ENDOCRINE: Blood sugars with adequate control  NEURO: No headache, no numbness or tingling  PSYCHIATRIC: Mood stable    Physical Exam:     All notes, images, and labs from past 24 hours (at minimum) were reviewed.    Vital signs:  Temp: 97.9  F (36.6  C) Temp src: Oral BP: 106/57 Pulse: 80   Resp: 18 SpO2: 100 % O2 Device: Mechanical Ventilator Oxygen Delivery: 6 LPM Height: 160 cm (5' 3\") Weight: 54.2 kg (119 lb 7.8 oz)  I/O:   Intake/Output Summary (Last 24 hours) at 7/18/2024 0721  Last data filed at 7/18/2024 0200  Gross per 24 hour   Intake 1105 ml   Output --   Net 1105 ml     Constitutional: Sitting up in bed, bedside iHD in progress, spouse at bedside, in no apparent distress.   HEENT: Eyes with pink conjunctivae, anicteric.  Oral mucosa moist without lesions.  Trach cdi.  PULM: Mildly diminished bases bilaterally.  Loose rhonchi t/o.  Non-labored breathing on Full vent support.  CV: Normal S1 and S2.  RRR.  No murmur, gallop, or rub.  No peripheral edema.   ABD: NABS, soft, nontender, nondistended.  PEG/J tube cdi.  MSK: Moves all extremities.  + muscle wasting.   NEURO: Alert and conversant by mouthing words.   SKIN: Warm, dry.  No rash on limited exam.   PSYCH: Mood stable.     Data:     LABS    CMP:   Recent Labs   Lab 07/18/24  0430 07/18/24  0055 07/17/24  2042 07/17/24  1650 07/17/24  0744 07/17/24  0607 07/16/24  0831 07/16/24  0547 07/15/24  0856 07/15/24  0641 07/14/24  0816 07/14/24  0656   NA  --   --   --   --   --  133*  --  131*  --  133*  --  133*   POTASSIUM  --   --   --   --   --  3.2*  --  3.1*  --  3.1*  --  3.6   CHLORIDE  --   --   --   --   --  93*  --  92*  --  95*  --  97*   CO2  --   --   --   --   --  30*  --  24  --  25  --  27   ANIONGAP  --   --   --   --   --  10  --  15  --  13  --  9   * " "137* 161* 206*   < > 150*   < > 166*   < > 157*   < > 187*   BUN  --   --   --   --   --  39.8*  --  86.1*  --  59.7*  --  33.9*   CR  --   --   --   --   --  1.65*  --  2.84*  --  2.23*  --  1.50*   GFRESTIMATED  --   --   --   --   --  35*  --  18*  --  24*  --  39*   CHELSEY  --   --   --   --   --  8.2*  --  7.9*  --  8.3*  --  8.4*   MAG  --   --   --   --   --  1.6*  --  1.9  --  1.9  --  1.7   PHOS  --   --   --   --   --  3.0  --  5.0*  --  4.5  --  3.3    < > = values in this interval not displayed.     CBC:   Recent Labs   Lab 07/17/24  0607 07/16/24  0547 07/15/24  0641 07/14/24  0656   WBC 2.4* 2.7* 2.5* 2.4*   RBC 2.34* 2.36* 2.38* 2.27*   HGB 8.0* 7.9* 7.8* 7.6*   HCT 24.3* 24.6* 25.5* 23.4*   * 104* 107* 103*   MCH 34.2* 33.5* 32.8 33.5*   MCHC 32.9 32.1 30.6* 32.5   RDW 24.5* 24.5* 25.0* 24.3*    167 150 153       INR: No lab results found in last 7 days.    Glucose:   Recent Labs   Lab 07/18/24  0430 07/18/24  0055 07/17/24  2042 07/17/24  1650 07/17/24  1304 07/17/24  0744   * 137* 161* 206* 156* 135*       Blood Gas: No lab results found in last 7 days.    Culture Data No results for input(s): \"CULT\" in the last 168 hours.    Virology Data:   Lab Results   Component Value Date    FLUAH1 Not Detected 06/18/2024    FLUAH3 Not Detected 06/18/2024    GA7170 Not Detected 06/18/2024    IFLUB Not Detected 06/18/2024    RSVA Not Detected 06/18/2024    RSVB Not Detected 06/18/2024    PIV1 Not Detected 06/18/2024    PIV2 Not Detected 06/18/2024    PIV3 Not Detected 06/18/2024    HMPV Not Detected 06/18/2024    HRVS Negative 01/24/2021    ADVBE Negative 01/24/2021    ADVC Negative 01/24/2021    ADVC Negative 12/23/2020    ADVC Negative 10/07/2019       Historical CMV results (last 3 of prior testing):  Lab Results   Component Value Date    CMVQNT <35 (A) 07/16/2024    CMVQNT Not Detected 07/09/2024    CMVQNT <35 (A) 06/18/2024     Lab Results   Component Value Date    CMVLOG <1.5 07/16/2024 "    CMVLOG 1.6 07/02/2024    CMVLOG 1.6 06/25/2024       Urine Studies    Recent Labs   Lab Test 06/19/24  1214 01/24/21  1729   URINEPH 6.5 5.0   NITRITE Negative Negative   LEUKEST Large* Moderate*   WBCU >182* 34*       Most Recent Breeze Pulmonary Function Testing (FVC/FEV1 only)  FVC-Pre   Date Value Ref Range Status   02/14/2024 0.85 L    12/19/2023 1.04 L    11/21/2023 0.85 L    10/24/2023 0.96 L      FVC-%Pred-Pre   Date Value Ref Range Status   02/14/2024 29 %    12/19/2023 36 %    11/21/2023 29 %    10/24/2023 33 %      FEV1-Pre   Date Value Ref Range Status   02/14/2024 0.80 L    12/19/2023 0.89 L    11/21/2023 0.78 L    10/24/2023 0.87 L      FEV1-%Pred-Pre   Date Value Ref Range Status   02/14/2024 34 %    12/19/2023 38 %    11/21/2023 33 %    10/24/2023 37 %        IMAGING    Recent Results (from the past 48 hour(s))   CT Chest w/o Contrast    Narrative    EXAMINATION: Chest CT  7/17/2024 9:37 AM    CLINICAL HISTORY: evaluate for worsening immune/inflammatory pathology  in setting of bilateral lung tranpslant in 2018    COMPARISON: 7/8/2024.    TECHNIQUE: CT imaging obtained through the chest without contrast.  Axial, coronal, and sagittal reconstructions and axial MIP reformatted  images are reviewed.     CONTRAST: None    FINDINGS:  Devices: Tracheostomy tube terminates in the mid thoracic trachea.  Percutaneous gastrojejunostomy tube in place with the balloon well  approximated within the stomach and the anterior abdominal wall in tip  terminating out of the field of view beyond the ligament of Treitz.  Intact medial and sternotomy wires.    Lungs: Status post bilateral lung transplantation. Azygos fissure. The  airway is patent. Patent right main stem bronchus stent which extends  into the proximal upper lobe and and proximal intermedius. No pleural  effusion or pneumothorax. Overall slightly improved appearance of the  multifocal groundglass confluent opacities throughout the bilateral  lower lobes  with more dense consolidation at the bases. More  appreciable improvement in the lingula and right middle lobe with  relative sparing of the apices. Unchanged peripherally calcified  pleural plaque in the left posteromedial chest. Calcified granuloma in  the right lower lobe.    Mediastinum: The thyroid is unremarkable. Cardiac size is normal.  Ascending aorta measures 4.0 cm. Normal three-vessel branching  pattern. No pericardial effusion. No significant coronary artery  calcium. Scattered atherosclerotic calcifications of the thoracic  aorta. No thoracic lymphadenopathy. Calcified right hilar lymph node.  Esophagus is normal in caliber.    Bones and soft tissues: No suspicious bone findings. Chronic  depression type vertebral body deformity at T5. Thickening of the left  breast skin is similar to prior.    Upper Abdomen: Limited evaluation of the upper abdomen. Heavy  calcification of the splenic artery. Splenomegaly. Calcified  gallstones.      Impression    IMPRESSION:   1. Overall improved appearance of the confluent groundglass opacities  primarily affecting the bilateral lower lobes. There is a more  appreciable improvement in the right middle lobe and lingula with  relative sparing of the apices.   2. Status post bilateral lung transplantation with stable support  devices.  3. Cholelithiasis.  4. Ectatic ascending aorta measuring up to 4.0 cm.  5. Atherosclerosis.    I have personally reviewed the examination and initial interpretation  and I agree with the findings.    TERRI ISSA MD         SYSTEM ID:  [id number]

## 2024-07-18 NOTE — PROGRESS NOTES
[facility]  ([address])    EDYTA Blue has been accepted for admission to [facility] today.       Ride: [phone]     RN to RN report: Please call [address] at [phone] for nurse to nurse report before the pt discharges.     Accepting MD: Dr. Hardy. Please contact Dr. Hardy  for a provider to provider report before the pt discharges.    Documentation needed prior to discharge: A visible discharge summary and discharge/readmission orders           Yenni Lim, PT, DPT  LTACH Referral Specialist    [facility]      [address] [address]  Admissions Office: [phone]  Fax: [phone]     CONFIDENTIAL Protected under [place] Statute  145.61 et seq

## 2024-07-19 NOTE — CONSULTS
Consultation - Pulmonary Medicine  Kecia Blue,  1960, MRN [id number]    Acute respiratory failure (H) [J96.00]   Code status:  Full Code       Extended Emergency Contact Information  Primary Emergency Contact:  Ranjan Blue  Address: [address] [place] [address] [facility]  Home Phone: [phone]  Mobile Phone: [phone]  Relation: Spouse  Secondary Emergency Contact: Thoennes Nasreen  Address: [address] [place] [address] [facility]  Home Phone: [phone]  Mobile Phone: [phone]  Relation: Daughter       Impressions:     Problems:  Acute on chronic hypoxic and hypercapnic respiratory failure suspect d/t post obstructive multi-lobar pneumonia 2/2 right bronchus stenosis s/p stent RMB to BI & bifurcating stent in RUL. Completed antibiotics. S/p trach on prolonged mechanical ventilation   6 Shiley tracheostomy placed 7/3/24: Hx trach in  s/p decann  Oropharyngeal dysphagia status post PEG tube on tube feeds   Possible  on steroid taper   S/p BSLT for ILD  c/b CLAD, possible ACR  Immunosuppression: Tacro goal level 8-10. 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 (goal trough >1.2)   Candida guillermondii on BAL completed 14d Micafungin -  H/o CMV viremia on VGCV ppx  EBV viremia s/p rituximab: EBV neg   ESRD on iHD  Anxiety with panic attack : Ativan PRN        Recommendations and Plans:   Phase 1 vent wean pathway: PS days as tolerated, AC at night   Nebs: levalbuterol QID, Mucomyst BID alternating with HTS for stent   Prednisone taper ordered: currently on 50mg daily, decrease to 45mg on   PTA Singulair   Azithromycin 125 mg daily (resumed  at lower dose, prior held 7/10 for prolonged QTc), repeat EKG in 2-3 days   Tacro levels qMTh - pharmacy and transplant to adjust dose accordingly   transplant ID follow up ~6-8 weeks   CMV weekly  monitoring   EBV monitoring, ordered for Monday   BDT and in-line PMV trials with Speech therapy  PT/OT           Chief Complaint Respiratory failure        HPI   I have been asked by Dr. Hardy to see Kecia Blue in consultation for trach and ventilator management.    Kecia Blue is a 63 year old year old female [facility] for ongoing treatment of respiratory failure.    The referring facility is [facility].  Records from that facility are available to assist in historical details.  Further history is provided by  at bedside.    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 The patient was admitted on 6/18/24 for progressive hypoxia with dyspnea and worsening hypercapnia in ED requiring intubation likely d/t post-obstructive PNA 2/2 right bronchus stenosis. S/p IP bronch (6/20) with laser tissue debulking RMB stenosis, airway balloon dilation of RMB and BI, and placement of stent RMB to BI and bifurcating stent in RUL. S/p CRRT (6/20-7/2), iHD resumed 7/4. Recurrence of paroxysmal afib with RVR first noted 6/25. S/p trach with ENT on 7/3 and GJ tube with IR on 7/5. Treating empirically for possible immune/inflammatory process vs organizing pneumonia (given GG changes on CT) with steroid burst/taper. Gradual improvement in PST on ventilator. She was discharged to LTACH 7/18/024 in the afternoon but was readmitted to MICU the same evening for suspected acute on chronic hypoxic respiratory failure, but likely exacerbated by severe anxiety episode. Transferred back to LTAC 7/19.      Today received PO ativan 2mg, IV ativan 1.5mg, seroquel 25mg since 0900 prior to transfer.  She's mildly sedated but still able to open eyes, follow commands.  Appears comfortable on vent in no distress. Obtaining adequate return volumes, RR 30s, PIP<20.  PS trial this am per  at bedside.  She denies sob, pain, n/v, anxiety at present.        [facility]  [email or web address]  @[email or web address]@ Social History  Reviewed, and she  reports that she has never smoked. She has never used smokeless tobacco. She reports that she does not drink alcohol and does not use drugs.    Smoking history:   History   Smoking Status    Never   Smokeless Tobacco    Never    Family History  Reviewed, and family history includes Arthritis in her mother; Diabetes in her father; Hypertension in her mother; Pancreatic Cancer in her father.   Allergies  Allergies   Allergen Reactions    Isopropyl Alcohol Other (See Comments)     The alcohol burns the open areas on her skin when used as a skin prep for procedures    Vancomycin Other (See Comments)     Diffuse erythroderma with itching (improved with Benadryl) after receiving vancomycin over 1 hour. 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 Can use vancomycin in the future, but please give over slower infusion time (at least 2 hours) and premedicate with Benadryl.              Current Medications:   Current Facility-Administered Medications   Medication Dose Route Frequency Provider Last Rate Last Admin    acetylcysteine (MUCOMYST) 10 % nebulizer solution 4 mL  4 mL Nebulization BID Dulce Hrady MD        [START ON 7/20/2024] azithromycin (ZITHROMAX) half-tab 125 mg  125 mg Per J Tube Daily Dulce Hardy MD        chlorhexidine (PERIDEX) 0.12 % solution 15 mL  15 mL Mouth/Throat Q12H Dulce Hardy MD        fiber modular (BANATROL TF) packet 1 packet  1 packet Per Feeding Tube TID Dulce aHrdy  MD        heparin ANTICOAGULANT injection 5,000 Units  5,000 Units Subcutaneous Q8H Dulce Hardy MD        insulin aspart (NovoLOG) injection (RAPID ACTING)  1-12 Units Subcutaneous Q4H Dulce Hardy MD        levalbuterol (XOPENEX) neb solution 0.63 mg  0.63 mg Nebulization 4x Daily Dulce Hardy MD        metoprolol tartrate (LOPRESSOR) tablet 25 mg  25 mg Per J Tube BID Dulce Hardy MD        montelukast (SINGULAIR) tablet 10 mg  10 mg Per J Tube At Bedtime Dulce Hardy MD        [START ON 7/20/2024] multivitamin RENAL (RENAVITE RX/NEPHROVITE) tablet 1 tablet  1 tablet Per J Tube Daily Dulce Hardy MD        pantoprazole (PROTONIX) 2 mg/mL suspension 40 mg  40 mg Per J Tube At Bedtime Dulce Hardy MD        [START ON 7/20/2024] pantoprazole (PROTONIX) EC tablet 40 mg  40 mg Oral QAM AC Dulce Hardy MD        [START ON 7/20/2024] polyethylene glycol (MIRALAX) Packet 17 g  17 g Oral Daily Dulce Hardy MD        posaconazole (NOXAFIL) DR tablet TBEC 300 mg  300 mg Per Feeding Tube QAM Dulce Hardy MD        [START ON 7/20/2024] predniSONE (DELTASONE) tablet 50 mg  50 mg Per J Tube Daily Dulce Hardy MD        [START ON 7/20/2024] Prosource TF20 ENfit Compatibl EN LIQD (PROSOURCE TF20) packet 60 mL  1 packet Per Feeding Tube Daily Dulce Hardy MD        QUEtiapine (SEROquel) tablet 25 mg  25 mg Per J Tube At Bedtime Dulce Hardy MD        ramelteon (ROZEREM) tablet 8 mg  8 mg Per J Tube At Bedtime Dulce Hardy MD        sodium chloride (NEBUSAL) 3 % neb solution 3 mL  3 mL Nebulization BID Dulce Hardy MD        [START ON 7/22/2024] sulfamethoxazole-trimethoprim (BACTRIM) 400-80 MG per tablet 1 tablet  1 tablet Per J Tube Once per day on Monday Wednesday Friday Dulce Hardy MD        tacrolimus (GENERIC) suspension 1.5 mg  1.5 mg Per G Tube BID IS Dulce Hardy MD        traZODone (DESYREL) half-tab 25  mg  25 mg Per J Tube At Bedtime Dulce Hardy MD        [START ON 7/20/2024] valGANciclovir (VALCYTE) solution 450 mg  450 mg Per J Tube Once per day on Tuesday Saturday Dulce Hardy MD        [START ON 7/22/2024] Vitamin D3 (CHOLECALCIFEROL) tablet 50 mcg  50 mcg Per J Tube Once per day on Monday Wednesday Friday Dulce Hardy MD              Review of Systems:  A 10-system review was obtained and is negative with the exception of the symptoms noted above. Physical Exam:  Temp:  [97.6  F (36.4  C)-98.7  F (37.1  C)] 97.9  F (36.6  C)  Pulse:  [77-99] 88  Resp:  [18-38] 33  BP: ()/(54-87) 118/87  FiO2 (%):  [30 %] 30 %  SpO2:  [91 %-100 %] 100 %  [unfilled]  Ventilator settings:   Vent Mode: CMV/AC  (Continuous Mandatory Ventilation/ Assist Control)  FiO2 (%): 30 %  Resp Rate (Set): 18 breaths/min  Tidal Volume (Set, mL): 320 mL  PEEP (cm H2O): 5 cmH2O  Pressure Support (cm H2O): 10 cmH2O  Resp: (!) 33      EXAM:  Physical Exam  Gen: No acute distress on AC  HEENT: NT, trach midline/intact  CV: RRR, no m/g/r  Resp: Coarse to auscultation R>L; non-labored   Abd: soft, nontender, BS+  Skin: no rashes or lesions  Ext: no edema  Neuro: alert, follows commands, mildly sedated        Pertinent Labs:  Lab Results: personally reviewed.   Recent Labs   Lab 07/19/24  0728   WBC 2.6*   HGB 7.9*   HCT 24.3*        Recent Labs   Lab 07/19/24  0728 07/18/24  1549 07/18/24  0657 07/17/24  0607   * 137 132* 133*   CO2 28  --  29 30*   BUN 35.0*  --  63.3* 39.8*     Venous Blood Gas  Recent Labs   Lab 07/19/24  0958 07/18/24  1549   PHV 7.40 7.26*   PCO2V 52* 72*   PO2V 26 29   HCO3V 32* 27   LASHAUN 6.2* 5.5*   O2PER 30  --           Pertinent Radiology:  Radiology results: images and reports personally viewed; radiology read below   XR CHEST PORT 1 VIEW 7/18/2024 6:30 PM       HISTORY: Evaluate stent placement     COMPARISON: Radiograph 7/11/2024. Chest CT 7/17/2024.      FINDINGS: Frontal view  of the chest. Stable tracheostomy tube. Stable  right-sided bronchial stents. Prior sternotomy. Stable heart size.  Midline trachea. Improved aeration throughout the left lung. No  pneumothorax. Right chest wall/pectoral surgical clips.                                                                      IMPRESSION: Stable position of the right-sided bronchial stents.  -----------  Chest CT  7/17/2024 9:37 AM     CLINICAL HISTORY: evaluate for worsening immune/inflammatory pathology  in setting of bilateral lung tranpslant in 2018     COMPARISON: 7/8/2024.     TECHNIQUE: CT imaging obtained through the chest without contrast.  Axial, coronal, and sagittal reconstructions and axial MIP reformatted  images are reviewed.      CONTRAST: None     FINDINGS:  Devices: Tracheostomy tube terminates in the mid thoracic trachea.  Percutaneous gastrojejunostomy tube in place with the balloon well  approximated within the stomach and the anterior abdominal wall in tip  terminating out of the field of view beyond the ligament of Treitz.  Intact medial and sternotomy wires.     Lungs: Status post bilateral lung transplantation. Azygos fissure. The  airway is patent. Patent right main stem bronchus stent which extends  into the proximal upper lobe and and proximal intermedius. No pleural  effusion or pneumothorax. Overall slightly improved appearance of the  multifocal groundglass confluent opacities throughout the bilateral  lower lobes with more dense consolidation at the bases. More  appreciable improvement in the lingula and right middle lobe with  relative sparing of the apices. Unchanged peripherally calcified  pleural plaque in the left posteromedial chest. Calcified granuloma in  the right lower lobe.     Mediastinum: The thyroid is unremarkable. Cardiac size is normal.  Ascending aorta measures 4.0 cm. Normal three-vessel branching  pattern. No pericardial effusion. No significant coronary artery  calcium. Scattered  atherosclerotic calcifications of the thoracic  aorta. No thoracic lymphadenopathy. Calcified right hilar lymph node.  Esophagus is normal in caliber.     Bones and soft tissues: No suspicious bone findings. Chronic  depression type vertebral body deformity at T5. Thickening of the left  breast skin is similar to prior.     Upper Abdomen: Limited evaluation of the upper abdomen. Heavy  calcification of the splenic artery. Splenomegaly. Calcified  gallstones.                                                                      IMPRESSION:   1. Overall improved appearance of the confluent groundglass opacities  primarily affecting the bilateral lower lobes. There is a more  appreciable improvement in the right middle lobe and lingula with  relative sparing of the apices.   2. Status post bilateral lung transplantation with stable support  devices.  3. Cholelithiasis.  4. Ectatic ascending aorta measuring up to 4.0 cm.  5. Atherosclerosis.   Other pertinent data:  Echocardiogram 6/19/2024  Interpretation Summary  Global and regional left ventricular function is normal with an EF of 55-60%.  Right ventricular function, chamber size, wall motion, and thickness are  normal.  The inferior vena cava cannot be assessed.  No pericardial effusion is present.  No significant valvular abnormalities present.      Tracheostomy tube data:  Date of initial placement: 7/3/2024  Current tube - type: Shiley , size: 6       Extensive record review is performed.  Key information about patient is reviewed in detail.  The respiratory plan of care is discussed with RT.  This patient will be rounded on regularly while requiring mechanical ventilator support.  Please contact us with questions or concerns.    Total time spent on pt examination and coordination of care was 60min   Eyad Harding CNP  Pulmonary Medicine  [facility]  Pager [phone]  Office: [phone]

## 2024-07-19 NOTE — DISCHARGE SUMMARY
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   Septic shock  Paroxysmal atrial fibrillation   Pulmonary Hypertension   Transient arhythmia with palpitations  Demand Ischemia  Prolonged Qtc  Diarrhea  Cholelithiasis with gallbladder wall thickening  End Stage Renal Disease  Acute on Chronic Normocytic Anemia  Thrombocytopenia    Follow-ups Needed After Discharge   - Transplant pulmonology in 6-8 weeks  - Interventional pulmonology in 1 month for repeat bronchoscopy (8/30)  - Transplant Infectious Disease on 8/23       Unresulted Labs Ordered in the Past 30 Days of this Admission       Date and Time Order Name Status Description    7/9/2024  8:19 AM Morphology Tracking In process     7/3/2024 11:30 AM Acid-Fast Bacilli Culture and Stain with AFB Stain In process     7/3/2024 11:24 AM Fungal or Yeast Culture Routine Preliminary     6/21/2024  5:04 PM Acid-Fast Bacilli Culture and Stain In process     6/19/2024  1:19 PM Acid-Fast Bacilli Culture and Stain In process         These results will be followed up by 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 Hospital course complicated by hypotension, delirium, and prolonged respiratory failure.  She has a tracheostomy placement on 7/3 and PEG-J tube placement with IR on 7/5. She was discharged to [facility] 7/18/024 in the afternoon but was readmitted to MICU the same evening for suspected acute on chronic hypoxic respiratory failure, but likely exacerbated by severe anxiety episode. The following problems were addressed during hospitalization:      Neuro:  #Pain  - Acetaminophen 1 gram q8h prn        #Toxic metabolic encephalopathy, resolved  Patient noted to have lethargy in setting of sepsis and delirium in the setting of high-dose steroid therapy which improved with management of underlying sepsis. Patient mental status returned to baseline at day of discharge.   - Quetiapine to 25 mg at bedtime   - Ramalteon at bedtime  - Trazodone 25mg for sleep  - Delirium precautions  - Do not disturb order overnight to promote sleep     #Anxiety, improving  Patient has been having anxiety regarding pressure support trials and weaning from the ventilator. Health Psychology consulted and followed patient weekly while hospitalized. Patient with likely anxiety episode upon transfer to [facility] yesterday. Vent settings at [facility] remained unchanged per RT note and patient vitally stable upon return. Repeat CXR stable with stable right side bronchial stents.  - Ativan 1mg po q4   - Ativan 0.5mg IV once prn for time of discharge  - Plan to monitor patient for 10 minutes on transport vent and record vitals prior to leaving room on transfer     Pulmonary:  #Acute on chronic hypoxic hypercapnic respiratory failure,  improving  #Concern for possible immune vs inflammatory process, improving  #HAP, Stenotrophomonas maltophilia, Enterococcus faecalis, and Candida guilliermondi complex  #Severe pulmonary edema  #Acute respiratory distress syndrome, resolved  Presented to the ED on 6/18/2024 with acute on chronic hypoxic hypercapnic respiratory failure. Transferred to MICU and intubated on overnight on 6/18. Workup significant for Stenotrophomonas, Enterococcus, and Candida pneumonia. Course was c/b progression to ARDS (6/21-22). CXR (6/26) with similar pulmonary opacities compared to 6/24, left > right.  The etiology of this groundglass opacity was unclear but ddx includes rejection vs. infection vs. volume overload. Tracheostomy placed on 7/3 due to inability to wean from ventilator. CT chest w/o contrast 7/8 demonstrated waxing and waning mostly improved right lung consolidation now with diffuse groundglass organizing opacities mostly in the lower lobe an middle lobe more than the upper lobe. Slight improvement in the peribronchiolar consolidation in the left lower lobe but there is increased groundglass organization in the left upper lobe. CT on 7/17 demonstrated improvement in bibasilar predominant GGO.  - Pulmonary toilet  - Mucomyst BID  - Hypertonic saline BID, alternate with mucomyst  - Albuterol neb QID  - Antibiotics as below  - Volume management with iHD  - Continue Daily SBT (AM and PM) on pressure support 10/5  - Pulm Transplant following while inpatient              - Plan for transplant pulmonology follow-up in 6-8 weeks     Vent Mode: CMV/AC  (Continuous Mandatory Ventilation/ Assist Control)  FiO2 (%): 30 %  Resp Rate (Set): 18 breaths/min  Tidal Volume (Set, mL): 320 mL  PEEP (cm H2O): 5 cmH2O  Pressure Support (cm H2O): 10 cmH2O  Resp: 25     #Recurrent right middle bronchus stenosis s/p dilation and stent (6/20/2024), stable  Has history (bronchoscopy 2/15/24) demonstrating narrowing of right mainstem transplant  anastomosis and bronchus intermedius. CT chest (6/18/2024) and bronchoscopy (6/19/2024) demonstrated occlusion of right middle bronchus. With concern for post-obstructive pneumonia, interventional pulmonology was consulted, and completed bronchoscopy (6/20/2024) with tissue debulking, right middle bronchus balloon dilation to 11 mm, stent placement in right main bronchus, and bifurcating cast placement in right upper bronchus. IP pulm re-evaluated bronchial stents with BAL and noted that they were open.    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   - Prospera (7/3) 2.26  - DSA (7/3) negative  - PTA nebs  - Fluticasone-viilanterol (breo ellipta) every day - HOLD with respiratory infection  - Montelukast every day   - Immunosuppressants per Tx Pulm:   - PTA Tacrolimus 1.5mg BID  - PTA Prednisone 5 mg daily   - PTA azathioprine - HOLD per Transplant pulmonology with repeat infections  - Peripheral smear (7/9) pending for pancytopenia  - Antibiotic prophylaxis               - Bactrim MWF for PJP ppx (monitor need to adjust pending HD plan)  - CMV weekly monitoring (qTuesday)  - Azithromycin 125mg per pulmonary, continue to monitor QTc  - Continue VGCV ppx (renally dosed, monitor need to adjust pending HD plan) with tentative plan for 3 weeks beyond completion of stress dose steroids   - Steroid Taper per Transplant Pulmonology as below:  Date Daily dose (mg)   7/11 50   7/25 45   8/8 40   8/22 35   9/5 30   9/19 25   10/17 20   11/14 15   12/12 10   1/9 5      - Hold PTA prednisone dose while completing steroid taper as above     Cardiovascular:  #Septic shock, resolved  On 6/19/2024, developed sepsis in the setting of stenotrophomonas  pneumonia/enterococcus faecium VRE UTI. Etiology of shock likely distributive iso sepsis; less likely hypovolemic (not pulling volumes with CRRT), medication-associated (weaned off of propofol gtt) or obstructive (low suspicion for PE, echo 6/19 without evidence of cardiac dysfunction). Occasional episodes of hypotension overnight, but self-resolving and have not occurred in a couple days. Related to HAP, resolved with treatment of infection as below.   - s/p SDS & pressors and midodrine, off of all support agents  - MAPs 70s-80s, stable     #Demand Ischemia, resolved    Presented with elevated troponin (peak 499). Not associated with chest pain or hypoxia. EKG without ST elevations/depressions or T wave inversions. Suspect demand ischemia in the setting of sepsis. Will continue to monitor symptoms and continuous telemetry. EKG 7/2 sinus rhythm with PACs.     #Paroxysmal A-Fib, improved  #Pulmonary Hypertension   #Transient arhythmia with palpitations  History of pulmonary hypertension (45 mmHg, echo 10/2023) in the setting of ILD and paroxysmal afib on PTA metoprolol tartrate BID. Not on anticoagulation for afib. Transient unspecified arhythmia overnight (7/9) with palpitations. EKG 7/9 sinus rhythm with fusion complexes and known RBBB. Repeat EKG 7/10 showing sinus rhythm with no RBBB or fusion complexes. Patient given magnesium 1g and 20mEq K+ for K 3.3 this am. No further episodes of palpitations. Currently in sinus rhythm.  - continue PTA metoprolol tartrate 25mg BID  - CTM     #Prolonged Qtc, resolved  Repeat EKG 7/13 with QTC of 483.   - Azithromycin ppx restarted per pulm transplant  -Qtc 476 (7/17)  -Daily EKG     GI/Nutrition:  #Nutrition  PEG-J placed by IR on 7/5, continue tube feeds.   - Nutrition consulted  - Tube feeds 30ml/hr; at goal     #Diarrhea, resolved  On 6/21, had 8 bowel movements. Occurred in the setting of scheduled bowel regimen and starting new antibiotics (meropenem, levofloxacin). C  diff negative.   - Holding PRN bowel regimen for loose stools  - Miralax PRN   - Senna prn  - formula changes made per nutrition        #Cholelithiasis with gallbladder wall thickening, resolved  During admission patient found to have up trending LFTs and fever in setting of GB wall thickening on CT abdomen/pelvis, now resolved. MRCP completed showing borderline dilated CBD up to 1.1 cm, no significant intrahepatic biliary dilation, no evidence of choledocholithiasis, no evidence of acute cholecystitis.     Renal/Fluids/Electrolytes:  #ESRD on iHD (M,W,F)  History of ESRD 2/2 Tacrolimus toxicity on HD (MWF). With soft blood pressures, placed RIJ (6/20/2024) and started CRRT (6/20/2024). US of AVF 7/5: arterial inflow patent without inflow stenosis, normal diameter of anastamosis, venous outflow elevated velocity at subclavian- suggestive of recurrent stenosis in venous outflow (area of prior angioplasty in March). IR consulted who noted that IR fistulogram not required at that time, and was able to run iHD on beginning on 7/6 without issue.   - Nephrology consulted and following for ESRD, patient tolerating 3x weekly HD at time of discharge  - Renally-dosed medications  - BMP daily  - HD T/ Th/ Sa schedule now, may need to transition back to M/W/F following discharge from [facility]  - Replete K 3.2 with 20mEq KCL po     Endocrine:  #Risk for hyperglycemia with high dose steroids  Stress dose steroids discontinued, resume PTA prednisone 5 mg daily. Blood sugars have remained in appropriate range.  - NPH 8U BID  - CTM     ID:  #HAP, Stenotrophomonas maltophilia, Enterococcus faecalis, Aspergillus, and Candida guilliermondi complex  Presented to the ED on 6/18/2024 with SOB and hypercapnic respiratory failure. Found on bronchoscopy (6/19) to have RML obstruction by narrowing bronchus intermedius as well as copious mucopurulent secretions/mucus plugging. Previously treated for Stenotrophonomas/aspergillus pneumonia in  11/2023 with subsequent sputum culture (2/2024) negative for both Stenotrophonomas/Aspergillus. Etiology likely repeat Stenotrophomonas infection vs opportunistic infection from colonized microbe.   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                - Continue posaconazole 300 mg/day                - Complete 14 days of micafungin 150 mg/day today (6/25-7/9)- discontinued   - Continue VGCV prophylaxis  - Weekly CMV VL (Tuesdays, next 7/9), last was 39 (7/2)         - Azithromycin per pulmonary (holding due to QTC, but will follow up)  - TMP/SMX SS daily for PJP PPx (for life given h/o PJP)  - Awaiting susceptibilities on Aspergillus per transplant ID  - Present antibiotics               - Continue posaconazole 300 mg/day per pulm transplant (6/25 to present) for candida guillermondii and asergillus ochraceus on prior BAL  - Past antibiotics              - s/p micafungin 150 mg/day (6/25-7/9)  - s/p IV minocycline 100mg BD x 14 days total (6/20-7/5)- for stenotrophamonas  - PO linezolid 600 mg BID x 10 days (6/25-7/5)- for VRE in urine and BAL cultures  - S/p ceftazidime (6/25-6/28)               - S/p vancomycin (6/18-6/20)              - S/p zosyn (6/18-21)  - S/p Daptomycin (6/21-6/23)  - S/p Meropenem (6/21-6/24)  - S/p Levofloxacin (6/21-6/23)     - BAL fluid (7/3): pink, hazy, cell counts normal range, fluid sent for viral, bacterial and fungal cultures negative to date, cytology negative for malignancy and organisms;  preliminary AFB negative     #Pyuria, Enterococcus faecium VRE and  ESBL E. Coli   Asymptomatic. With progressive IV pressor needs, obtained broad infectious workup which demonstrated UCx (6/19/2024) with Enterococcus faecium VR and ESBL E. Coli. S/p Daptomycin (6/21-6/23) and Meropenem (6/21-6/24).     #Hx Aspergillus PNA (11/2023)  #Hx PJP PNA (1/2021)  #Hx CMV viremia, recurrent  #Hx EBV viremia  - Transplant ID consult, appreciate recs  PTA posaconazole (Treatment for hx of aspergillus PNA)  PTA azithromycin 250mg qd (CLAD ppx) (holding)  PTA bactrim (PJP ppx)  -CMV PCR 7/16 positive but <35     Hematology:    #Acute on Chronic Normocytic Anemia, stable  #Thrombocytopenia, chronic, improving  History of chronic anemia in the setting of kidney disease (baseline Hgb 10-11). Upon admission, Hgb 9. May be bone marrow suppression in the setting of chronic illness; potential contribution by blood loss with CRRT filter changes (~150-200c). Peripheral smear (6/18/2024) without evidence of schistocytes. Hemoglobin stabilized by time of discharge.      Musculoskeletal: - PT / OT consult,  encourage ambulation as tolerated  Skin: - Sacral Blanching, off load with frequent turns in bed          Consultations This Hospital Stay   PHYSICAL THERAPY ADULT IP CONSULT  OCCUPATIONAL THERAPY ADULT IP CONSULT  NEPHROLOGY ESRD ADULT IP CONSULT  NUTRITION SERVICES ADULT IP CONSULT  PULMONARY CF/TRANSPLANT ADULT IP CONSULT  PHARMACY IP CONSULT  CARE MANAGEMENT / SOCIAL WORK IP CONSULT  NEPHROLOGY ESRD ADULT IP CONSULT    Code Status   Full Code    This patient was discussed with Dr. Myron Davis MD  [facility]  [facility] [address] [place] [id number]  Phone: [phone]  ______________________________________________________________________    Physical Exam   Vital Signs: Temp: 98.2  F (36.8  C) Temp src: Oral BP: 128/69 Pulse: 81   Resp: 29 SpO2: 100 % O2 Device:  Mechanical Ventilator    Weight: 0 lbs 0 oz  General Appearance:  Alert and oriented, sitting up in chair on IPAD  Respiratory: Coarse breath sounds bilaterally, improved  Cardiovascular: Regular rate and rhythm, no M/R/G  GI: Soft, non-tender, non-distended. PEG- J without surrounding drainage or erythema  Skin: No rashes or lesions on exposed skin       Primary Care Physician   Ame Chow    Discharge Orders   No discharge procedures on file.    Significant Results and Procedures   Most Recent 3 CBC's:  Recent Labs   Lab Test 07/19/24  0728 07/18/24  1549 07/18/24  0657 07/17/24  0607   WBC 2.6*  --  2.5* 2.4*   HGB 7.9* 10.4* 8.1* 8.0*   *  --  107* 104*     --  152 153     Most Recent 3 BMP's:  Recent Labs   Lab Test 07/19/24  1125 07/19/24  0728 07/19/24  0402 07/18/24  1721 07/18/24  1549 07/18/24  0831 07/18/24  0657 07/17/24  0744 07/17/24  0607   NA  --  133*  --   --  137  --  132*  --  133*   POTASSIUM  --  3.8  --   --  4.5  --  3.4  --  3.2*   CHLORIDE  --  96*  --   --   --   --  92*  --  93*   CO2  --  28  --   --   --   --  29  --  30*   BUN  --  35.0*  --   --   --   --  63.3*  --  39.8*   CR  --  1.80*  --   --   --   --  2.42*  --  1.65*   ANIONGAP  --  9  --   --   --   --  11  --  10   CHELSEY  --  8.6*  --   --   --   --  8.1*  --  8.2*   * 90 85   < > 164*   < > 128*   < > 150*    < > = values in this interval not displayed.     Most Recent 2 LFT's:  Recent Labs   Lab Test 07/11/24  0518 07/02/24  0359   AST 30 26   ALT 13 37   ALKPHOS 282* 176*   BILITOTAL 0.4 0.6     Most Recent 3 INR's:  Recent Labs   Lab Test 06/18/24  1418 03/05/24  0925 02/14/24  1050   INR 0.98 0.98 0.96   ,   Results for orders placed or performed during the hospital encounter of 07/18/24   XR Chest Port 1 View    Narrative    XR CHEST PORT 1 VIEW 7/18/2024 6:30 PM      HISTORY: Evaluate stent placement    COMPARISON: Radiograph 7/11/2024. Chest CT 7/17/2024.     FINDINGS: Frontal view of the  chest. Stable tracheostomy tube. Stable  right-sided bronchial stents. Prior sternotomy. Stable heart size.  Midline trachea. Improved aeration throughout the left lung. No  pneumothorax. Right chest wall/pectoral surgical clips.      Impression    IMPRESSION: Stable position of the right-sided bronchial stents.    I have personally reviewed the examination and initial interpretation  and I agree with the findings.    MEHNAZ CHAPMAN MD         SYSTEM ID:  [id number]     *Note: Due to a large number of results and/or encounters for the requested time period, some results have not been displayed. A complete set of results can be found in Results Review.       Discharge Medications     Current Facility-Administered Medications:     acetaminophen (TYLENOL) tablet 650 mg, 650 mg, Oral, Q6H PRN, Jailyn Lou MD, 650 mg at 07/18/24 2053    acetylcysteine (MUCOMYST) 10 % nebulizer solution 4 mL, 4 mL, Nebulization, BID, Edin Davis MD, 4 mL at 07/19/24 0921    azithromycin (ZITHROMAX) half-tab 125 mg, 125 mg, Per J Tube, Daily, Rufina Huff CNP, 125 mg at 07/19/24 0910    chlorhexidine (PERIDEX) 0.12 % solution 15 mL, 15 mL, Mouth/Throat, Q12H, Edin Davis MD, 15 mL at 07/19/24 1017    dextrose 10% infusion, , Intravenous, Continuous PRN, Edin Davis MD    glucose gel 15-30 g, 15-30 g, Oral, Q15 Min PRN **OR** dextrose 50 % injection 25-50 mL, 25-50 mL, Intravenous, Q15 Min PRN **OR** glucagon injection 1 mg, 1 mg, Subcutaneous, Q15 Min PRN, Edin Davis MD    fiber modular (BANATROL TF) packet 1 packet, 1 packet, Per Feeding Tube, TID, Edin Davis MD, 1 packet at 07/19/24 1150    heparin 10,000 units/10 mL infusion (DIALYSIS USE), 500 Units/hr, Hemodialysis Machine, Continuous, Kuldip Sanches MD    heparin ANTICOAGULANT injection 5,000 Units, 5,000 Units, Subcutaneous, Q8H, Edin Davis MD, 5,000 Units at 07/19/24 3047    insulin aspart  (NovoLOG) injection (RAPID ACTING), 1-12 Units, Subcutaneous, Q4H, Edin Davis MD    levalbuterol (XOPENEX) neb solution 0.63 mg, 0.63 mg, Nebulization, 4x Daily, Edin Davis MD, 0.63 mg at 07/19/24 1138    lidocaine 1 % 0.5 mL, 0.5 mL, Intradermal, Once PRN, Kuldip Sanches MD    lidocaine 1 % 0.5 mL, 0.5 mL, Intradermal, Once PRN, Kuldip Sanches MD    LORazepam (ATIVAN) injection 0.5 mg, 0.5 mg, Intravenous, Once PRN, Evelyn Roberts APRN CNP    LORazepam (ATIVAN) tablet 1 mg, 1 mg, Oral, Q4H PRN, Evelyn Roberts APRN CNP    LORazepam (ATIVAN) tablet 1 mg, 1 mg, Oral, Q4H, Kuldip Sanches MD, 1 mg at 07/19/24 0910    metoprolol tartrate (LOPRESSOR) tablet 25 mg, 25 mg, Per J Tube, BID, Edin Davis MD, 25 mg at 07/19/24 0910    montelukast (SINGULAIR) tablet 10 mg, 10 mg, Per J Tube, At Bedtime, Edin Davis MD, 10 mg at 07/18/24 2100    multivitamin RENAL (RENAVITE RX/NEPHROVITE) tablet 1 tablet, 1 tablet, Per J Tube, Daily, Edin Davis MD, 1 tablet at 07/19/24 0910    pantoprazole (PROTONIX) 2 mg/mL suspension 40 mg, 40 mg, Per J Tube, At Bedtime, Edin Davis MD, 40 mg at 07/18/24 2100    polyethylene glycol (MIRALAX) Packet 17 g, 17 g, Oral, Daily, Edin Davis MD    posaconazole (NOXAFIL) DR tablet TBEC 300 mg, 300 mg, Per Feeding Tube, QALEENA, Kuldip Sanches MD    predniSONE (DELTASONE) tablet 50 mg, 50 mg, Per J Tube, Daily, Edin Davis MD, 50 mg at 07/19/24 0910    Prosource TF20 ENfit Compatibl EN LIQD (PROSOURCE TF20) packet 60 mL, 1 packet, Per Feeding Tube, Daily, Edin Davis MD, 60 mL at 07/19/24 1150    QUEtiapine (SEROquel) tablet 25 mg, 25 mg, Per J Tube, At Bedtime, Edin Davis MD, 25 mg at 07/18/24 2100    ramelteon (ROZEREM) tablet 8 mg, 8 mg, Per J Tube, At Bedtime, Edin Davis MD, 8 mg at 07/18/24 2101    senna-docusate (SENOKOT-S/PERICOLACE) 8.6-50 MG per tablet 1 tablet, 1 tablet, Oral, BID  PRN **OR** senna-docusate (SENOKOT-S/PERICOLACE) 8.6-50 MG per tablet 2 tablet, 2 tablet, Oral, BID PRN, Edin Davis MD    sodium chloride (NEBUSAL) 3 % neb solution 3 mL, 3 mL, Nebulization, BID, Edin Davis MD, 3 mL at 07/19/24 0921    sodium chloride 0.9% BOLUS 100-150 mL, 100-150 mL, Intravenous, Q15 Min PRN, Kuldip Sanches MD    Stop Heparin 60 minutes before end of treatment, , Does not apply, Continuous PRN, Kuldip Sanches MD    sulfamethoxazole-trimethoprim (BACTRIM) 400-80 MG per tablet 1 tablet, 1 tablet, Per J Tube, Once per day on Monday Wednesday Friday, Edin Davis MD    tacrolimus (GENERIC) suspension 1.5 mg, 1.5 mg, Per G Tube, BID IS, Edin Davis MD, 1.5 mg at 07/19/24 0912    traZODone (DESYREL) half-tab 25 mg, 25 mg, Per J Tube, At Bedtime, Edin Davis MD, 25 mg at 07/18/24 2101    [START ON 7/20/2024] valGANciclovir (VALCYTE) solution 450 mg, 450 mg, Per J Tube, Once per day on Tuesday Saturday, Edin Davis MD    Vitamin D3 (CHOLECALCIFEROL) tablet 50 mcg, 50 mcg, Per J Tube, Once per day on Monday Wednesday Friday, Edin Davis MD      Allergies   Allergies   Allergen Reactions    Isopropyl Alcohol Other (See Comments)     The alcohol burns the open areas on her skin when used as a skin prep for procedures    Vancomycin Other (See Comments)     Diffuse erythroderma with itching (improved with Benadryl) after receiving vancomycin over 1 hour. 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 Can use vancomycin in the future, but please give over slower infusion time (at least 2 hours) and premedicate with Benadryl.

## 2024-07-19 NOTE — PROGRESS NOTES
[facility]  ([address])    Kecia Blue has been accepted for admission to [facility] today.     Ride: 1300    RN to RN report: Please call [address] at [phone] for nurse to nurse report before the pt discharges.     Accepting MD: Dr. Hardy. Please contact Dr. Hardy for a provider to provider report before the pt discharges.     Documentation needed prior to discharge: A visible discharge summary and discharge/readmission orders.         Yenni Lim, PT, DPT  ACH Referral Specialist    [facility]      [address] [address]  Admissions Office: [phone]  Fax: [phone]     CONFIDENTIAL Protected under [place] Statute  145.61 et seq

## 2024-07-19 NOTE — H&P
LTACH    History and Physical - Hospitalist Service       Date of Admission:  7/19/2024    Brief History:     Kecia Blue is a 63 year old female with PMH ILD s/p bilateral lung transplant (2018) c/b recurrent right bronchial stenosis s/p repeat balloon dilation/stenting, repeat opportunistic pneumonia (PJP 2021, aspergillus/stenotrophomonas 11/2023) and viremias (EBV, CMV), and 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 Hospital course complicated by hypotension, delirium, and prolonged respiratory failure.  She has a tracheostomy placement on 7/3 and PEG-J tube placement with IR on 7/5.     Patient arrived on LTACH unit on 7/18 in severe respiratory distress. She complained of nausea, chest pressure, and dsypnea. She is tachypneic and breathing at 41 breaths per minute. She is tripoding and pulse ox reading in low 80's, FiO2 on vent was increased to 50%. She has severe rhonchi and rales in all lung fields, worst in RUL. She was suctioned twice and minimal secretions noted and was given xopenex. Her rhochi and rales of L lung field improved, but not the RUL. She is having severe anxiety and was given 0.5 mg of ativan IVP. She is complaining of chest pressure and tachycardic to 133. EKG done and revealed an incomplete right bundle branch block, no ST-T wave changes in contiguous leads. BP is 215/91- given 10mg of IV hydralazine, BP decreased to 185/99. Spoke with Dr. Sanches and he accepted pt back to [facility].  On 7/19, pt has improved and vitals are now stable and has been transferred back to [place]    Assessment & Plan      #Acute on chronic hypoxic hypercapnic respiratory failure, improving  #Concern for possible immune vs inflammatory process, improving  #HAP, Stenotrophomonas maltophilia, Enterococcus faecalis, and Candida guilliermondi complex  #Severe  pulmonary edema  #Acute respiratory distress syndrome, resolved  Presented to the ED on 6/18/2024 with acute on chronic hypoxic hypercapnic respiratory failure. Transferred to MICU and intubated on overnight on 6/18. Workup significant for Stenotrophomonas, Enterococcus, and Candida pneumonia. Course was c/b progression to ARDS (6/21-22). CXR (6/26) with similar pulmonary opacities compared to 6/24, left > right.  The etiology of this groundglass opacity was unclear but ddx includes rejection vs. infection vs. volume overload. Tracheostomy placed on 7/3 due to inability to wean from ventilator. CT chest w/o contrast 7/8 demonstrated waxing and waning mostly improved right lung consolidation now with diffuse groundglass organizing opacities mostly in the lower lobe an middle lobe more than the upper lobe. Slight improvement in the peribronchiolar consolidation in the left lower lobe but there is increased groundglass organization in the left upper lobe. CT on 7/17 demonstrated improvement in bibasilar predominant GGO.  - Pulmonary toilet  - Mucomyst BID  - Hypertonic saline BID, alternate with mucomyst  - Albuterol neb QID  - Antibiotics as below  - Volume management with iHD  - Continue Daily SBT (AM and PM) on pressure support 10/5  - Pulm Transplant following while inpatient              - Plan for transplant pulmonology follow-up in 6-8 weeks     Vent Mode: CMV/AC  (Continuous Mandatory Ventilation/ Assist Control)  FiO2 (%): 30 %  Resp Rate (Set): 18 breaths/min  Tidal Volume (Set, mL): 320 mL  PEEP (cm H2O): 5 cmH2O  Pressure Support (cm H2O): 10 cmH2O  Resp: 25     #Recurrent right middle bronchus stenosis s/p dilation and stent (6/20/2024), stable  Has history (bronchoscopy 2/15/24) demonstrating narrowing of right mainstem transplant anastomosis and bronchus intermedius. CT chest (6/18/2024) and bronchoscopy (6/19/2024) demonstrated occlusion of right middle bronchus. With concern for post-obstructive  pneumonia, interventional pulmonology was consulted, and completed bronchoscopy (6/20/2024) with tissue debulking, right middle bronchus balloon dilation to 11 mm, stent placement in right main bronchus, and bifurcating cast placement in right upper bronchus. IP pulm re-evaluated bronchial stents with BAL and noted that they were open.    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   - Prospera (7/3) 2.26  - DSA (7/3) negative  - PTA nebs  - Fluticasone-viilanterol (breo ellipta) every day - HOLD with respiratory infection  - Montelukast every day   - Immunosuppressants per Tx Pulm:   - PTA Tacrolimus 1.5mg BID  - PTA Prednisone 5 mg daily   - PTA azathioprine - HOLD per Transplant pulmonology with repeat infections  - Peripheral smear (7/9) pending for pancytopenia  - Antibiotic prophylaxis               - Bactrim MWF for PJP ppx (monitor need to adjust pending HD plan)  - CMV weekly monitoring (qTuesday)  - Azithromycin 125mg per pulmonary, continue to monitor QTc  - Continue VGCV ppx (renally dosed, monitor need to adjust pending HD plan) with tentative plan for 3 weeks beyond completion of stress dose steroids   - Steroid Taper per Transplant Pulmonology as below:  Date Daily dose (mg)   7/11 50   7/25 45   8/8 40   8/22 35   9/5 30   9/19 25   10/17 20   11/14 15   12/12 10   1/9 5      - Hold PTA prednisone dose while completing steroid taper as above     #HAP, Stenotrophomonas maltophilia, Enterococcus faecalis, Aspergillus, and Candida guilliermondi complex  Presented to the ED on 6/18/2024 with SOB and hypercapnic respiratory failure. Found on bronchoscopy (6/19) to have RML obstruction by narrowing bronchus intermedius as well as  copious mucopurulent secretions/mucus plugging. Previously treated for Stenotrophonomas/aspergillus pneumonia in 11/2023 with subsequent sputum culture (2/2024) negative for both Stenotrophonomas/Aspergillus. Etiology likely repeat Stenotrophomonas infection vs opportunistic infection from colonized microbe.   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                - Continue posaconazole 300 mg/day                - Complete 14 days of micafungin 150 mg/day today (6/25-7/9)- discontinued   - Continue VGCV prophylaxis  - Weekly CMV VL (Tuesdays, next 7/9), last was 39 (7/2)         - Azithromycin per pulmonary (holding due to QTC, but will follow up)  - TMP/SMX SS daily for PJP PPx (for life given h/o PJP)  - Awaiting susceptibilities on Aspergillus per transplant ID  - Present antibiotics               - Continue posaconazole 300 mg/day per pulm transplant (6/25 to present) for candida guillermondii and asergillus ochraceus on prior BAL  - Past antibiotics              - s/p micafungin 150 mg/day (6/25-7/9)  - s/p IV minocycline 100mg BD x 14 days total (6/20-7/5)- for stenotrophamonas  - PO linezolid 600 mg BID x 10 days (6/25-7/5)- for VRE in urine and BAL cultures  - S/p ceftazidime (6/25-6/28)               - S/p vancomycin (6/18-6/20)              - S/p zosyn (6/18-21)  - S/p Daptomycin (6/21-6/23)  - S/p Meropenem (6/21-6/24)  - S/p Levofloxacin (6/21-6/23)     - BAL fluid (7/3): pink, hazy, cell counts normal range, fluid  sent for viral, bacterial and fungal cultures negative to date, cytology negative for malignancy and organisms; preliminary AFB negative     #Hx Aspergillus PNA (11/2023)  #Hx PJP PNA (1/2021)  #Hx CMV viremia, recurrent  #Hx EBV viremia  - Transplant ID consult, appreciate recs  PTA posaconazole (Treatment for hx of aspergillus PNA)  PTA azithromycin 250mg qd (CLAD ppx) (holding)  PTA bactrim (PJP ppx)  -CMV PCR 7/16 positive but <35     #ESRD on iHD (M,W,F)  History of ESRD 2/2 Tacrolimus toxicity on HD (MWF). With soft blood pressures, placed RIJ (6/20/2024) and started CRRT (6/20/2024). US of AVF 7/5: arterial inflow patent without inflow stenosis, normal diameter of anastamosis, venous outflow elevated velocity at subclavian- suggestive of recurrent stenosis in venous outflow (area of prior angioplasty in March). IR consulted who noted that IR fistulogram not required at that time, and was able to run iHD on beginning on 7/6 without issue.   - Nephrology consulted and following for ESRD, patient tolerating 3x weekly HD at time of discharge  - Renally-dosed medications  - BMP daily  - HD T/ Th/ Sa schedule now, may need to transition back to M/W/F following discharge from LTACH  - Replete K 3.2 with 20mEq KCL po     #Anxiety, improving  Patient has been having anxiety regarding pressure support trials and weaning from the ventilator. Health Psychology consulted and followed patient weekly while hospitalized.      #Toxic metabolic encephalopathy, resolved  Patient noted to have lethargy in setting of sepsis and delirium in the setting of high-dose steroid therapy which improved with management of underlying sepsis. Patient mental status returned to baseline at day of discharge.   - Quetiapine to 25 mg at bedtime   - Ramalteon at bedtime  - Trazodone 25mg for sleep  - Delirium precautions  - Do not disturb order overnight to promote sleep     #Septic shock, resolved  On 6/19/2024, developed sepsis in the setting  of stenotrophomonas pneumonia/enterococcus faecium VRE UTI. Etiology of shock likely distributive iso sepsis; less likely hypovolemic (not pulling volumes with CRRT), medication-associated (weaned off of propofol gtt) or obstructive (low suspicion for PE, echo 6/19 without evidence of cardiac dysfunction). Occasional episodes of hypotension overnight, but self-resolving and have not occurred in a couple days. Related to HAP, resolved with treatment of infection as below.   - s/p SDS & pressors and midodrine, off of all support agents  - MAPs 70s-80s, stable     #Demand Ischemia, resolved    Presented with elevated troponin (peak 499). Not associated with chest pain or hypoxia. EKG without ST elevations/depressions or T wave inversions. Suspect demand ischemia in the setting of sepsis. Will continue to monitor symptoms and continuous telemetry. EKG 7/2 sinus rhythm with PACs.     #Paroxysmal A-Fib, improved  #Pulmonary Hypertension   #Transient arhythmia with palpitations  History of pulmonary hypertension (45 mmHg, echo 10/2023) in the setting of ILD and paroxysmal afib on PTA metoprolol tartrate BID. Not on anticoagulation for afib. Transient unspecified arhythmia overnight (7/9) with palpitations. EKG 7/9 sinus rhythm with fusion complexes and known RBBB. Repeat EKG 7/10 showing sinus rhythm with no RBBB or fusion complexes. Patient given magnesium 1g and 20mEq K+ for K 3.3 this am. No further episodes of palpitations. Currently in sinus rhythm.  - continue PTA metoprolol tartrate 25mg BID  - CTM     #Prolonged Qtc, resolved  Repeat EKG 7/13 with QTC of 483.   - Azithromycin ppx restarted per pulm transplant  -Qtc 476 (7/17)  -Daily EKG        Severe protein calorie deficiency  PEG-J placed by IR on 7/5, continue tube feeds.   - Nutrition consulted  - Tube feeds 30ml/hr; at goal     #Diarrhea, resolved  On 6/21, had 8 bowel movements. Occurred in the setting of scheduled bowel regimen and starting new antibiotics  (meropenem, levofloxacin). C diff negative.   - Holding PRN bowel regimen for loose stools  - Miralax PRN   - Senna prn  - formula changes made per nutrition      #Cholelithiasis with gallbladder wall thickening, resolved  During admission patient found to have up trending LFTs and fever in setting of GB wall thickening on CT abdomen/pelvis, now resolved. MRCP completed showing borderline dilated CBD up to 1.1 cm, no significant intrahepatic biliary dilation, no evidence of choledocholithiasis, no evidence of acute cholecystitis.           #Risk for hyperglycemia with high dose steroids  Stress dose steroids discontinued, resume PTA prednisone 5 mg daily. Blood sugars have remained in appropriate range.  - NPH 8U BID  - CTM        #Pyuria, Enterococcus faecium VRE and  ESBL E. Coli   Asymptomatic. With progressive IV pressor needs, obtained broad infectious workup which demonstrated UCx (6/19/2024) with Enterococcus faecium VR and ESBL E. Coli. S/p Daptomycin (6/21-6/23) and Meropenem (6/21-6/24).        #Acute on Chronic Normocytic Anemia, stable  #Thrombocytopenia, chronic, improving  History of chronic anemia in the setting of kidney disease (baseline Hgb 10-11). Upon admission, Hgb 9. May be bone marrow suppression in the setting of chronic illness; potential contribution by blood loss with CRRT filter changes (~150-200c). Peripheral smear (6/18/2024) without evidence of schistocytes.                 Diet: Adult Formula Drip Feeding: Continuous Novasource Renal; Jejunostomy; Goal Rate: 30; mL/hr; OK to begin at goal rate. Please abide by 8-hour hang-time for food safety. Pour 237 mL (1 carton) of formula into TF pump bag per 8-hour batch.; Do not ad...    DVT Prophylaxis: Heparin SQ  Basilio Catheter: Not present  Lines: None     Cardiac Monitoring: None  Code Status: Full Code      Clinically Significant Risk Factors Present on Admission            # Hypomagnesemia: Lowest Mg = 1.6 mg/dL in last 2 days, will  replace as needed         # Acute Respiratory Failure: based on blood gas results.  Continue supplemental oxygen as needed   # Anemia: based on hgb <11        #Precipitous drop in Hgb/Hct: Lowest Hgb this hospitalization: 7.9 g/dL. Will continue to monitor and treat/transfuse as appropriate.         # Financial/Environmental Concerns:           Disposition Plan     Medically Ready for Discharge: Anticipated in 5+ Days           Dulce Hardy MD  Hospitalist Service  LTACH  Securely message with [facility] (more info)  Text page via [facility] Paging/Directory     ______________________________________________________________________    Chief Complaint   dyspnea        History of Present Illness     Kecia Blue is a 63 year old female with PMH ILD s/p bilateral lung transplant (2018) c/b recurrent right bronchial stenosis s/p repeat balloon dilation/stenting, repeat opportunistic pneumonia (PJP 2021, aspergillus/stenotrophomonas 11/2023) and viremias (EBV, CMV), and 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 Hospital course complicated by hypotension, delirium, and prolonged respiratory failure.  She has a tracheostomy placement on 7/3 and PEG-J tube placement with IR on 7/5.     Past Medical History    Past Medical History:   Diagnosis Date    Acute on chronic respiratory failure with hypoxia (H) 02/21/2018    Anisocoria     Antisynthetase syndrome (H24) 2014    Anxiety     Aspergillus (H)     Aspergillus pneumonia (H) 11/20/2020    Benign essential hypertension     C. difficile colitis     Cytomegalovirus (CMV) viremia (H)     Dermatomyositis (H)     Dysplasia of cervix, low grade (ESTRADA 1)     EBV (Franklin-Barr virus) viremia     ESRD (end stage renal disease) on dialysis (H)     ILD (interstitial lung disease) (H)     Lung replaced by transplant (H)     Osteopenia      Paroxysmal atrial fibrillation (H)     Pneumocystis jiroveci pneumonia (H)     PONV (postoperative nausea and vomiting)     Post-menopause     Pulmonary hypertension (H)     Raynaud's disease     Seronegative rheumatoid arthritis (H)        Past Surgical History   Past Surgical History:   Procedure Laterality Date    BRONCHOSCOPY (RIGID OR FLEXIBLE), DIAGNOSTIC N/A 04/10/2018    Procedure: COMBINED BRONCHOSCOPY (RIGID OR FLEXIBLE), LAVAGE;;  Surgeon: Mariposa Donohue MD;  Location: [facility]    BRONCHOSCOPY (RIGID OR FLEXIBLE), DIAGNOSTIC N/A 12/23/2020    Procedure: BRONCHOSCOPY, WITH BRONCHOALVEOLAR LAVAGE;  Surgeon: Uri Bass MD;  Location: [facility]    BRONCHOSCOPY (RIGID OR FLEXIBLE), DIAGNOSTIC N/A 05/26/2022    Procedure: BRONCHOSCOPY, WITH BRONCHOALVEOLAR LAVAGE;  Surgeon: Uri Bass MD;  Location: [facility]    BRONCHOSCOPY (RIGID OR FLEXIBLE), DIAGNOSTIC N/A 08/17/2023    Procedure: BRONCHOSCOPY, WITH BRONCHOALVEOLAR LAVAGE;  Surgeon: Esau Bruno MD;  Location: [facility]    BRONCHOSCOPY (RIGID OR FLEXIBLE), DIAGNOSTIC N/A 11/01/2023    Procedure: BRONCHOSCOPY, WITH BRONCHOALVEOLAR LAVAGE;  Surgeon: Beto Magaña DO;  Location: [facility]    BRONCHOSCOPY (RIGID OR FLEXIBLE), DILATE BRONCHUS / TRACHEA N/A 10/11/2018    Procedure: BRONCHOSCOPY (RIGID OR FLEXIBLE), DILATE BRONCHUS / TRACHEA;  Flexible And Rigid Bronchoscopy and Dilation;  Surgeon: Wilber Lin MD;  [facility]: [facility]    BRONCHOSCOPY (RIGID OR FLEXIBLE), DILATE BRONCHUS / TRACHEA N/A 11/01/2023    Procedure: Bronchoscopy (Rigid Or Flexible), Dilate Bronchus / Trachea;  Surgeon: Beto Magaña DO;  Location: [facility]    BRONCHOSCOPY FLEXIBLE N/A 03/13/2018    Procedure: BRONCHOSCOPY FLEXIBLE;  Flexible Bronchoscopy ;  Surgeon: Gissell Sanchez MD;  Location: [facility]    BRONCHOSCOPY FLEXIBLE N/A 05/09/2018    Procedure: BRONCHOSCOPY FLEXIBLE;;  Surgeon: Wilber Lin MD;  Location: [facility]    BRONCHOSCOPY FLEXIBLE  AND RIGID N/A 09/10/2018    Procedure: BRONCHOSCOPY FLEXIBLE AND RIGID;  Flexible and Rigid Bronchoscopy with Balloon Dilation, tissue debulking with cryo, and Right mainstem bronchus stent placement;  Surgeon: Wilber Lin MD;  [facility]    BRONCHOSCOPY RIGID N/A 06/06/2018    Procedure: BRONCHOSCOPY RIGID;;  Surgeon: Lopez Macias MD;  Location: [facility]    BRONCHOSCOPY, DILATE BRONCHUS, STENT BRONCHUS, COMBINED N/A 06/11/2018    Procedure: COMBINED BRONCHOSCOPY, DILATE BRONCHUS, STENT BRONCHUS;  Flexible Bronchoscopy, Balloon Dilation, Bronchial Washings;  Surgeon: Wilber Lin MD;  [facility]    BRONCHOSCOPY, DILATE BRONCHUS, STENT BRONCHUS, COMBINED Right 07/10/2018    Procedure: COMBINED BRONCHOSCOPY, DILATE BRONCHUS, STENT BRONCHUS;  Flexible Bronchoscopy, Balloon Dilation, Bronchial Washings  ;  Surgeon: Wilber Lin MD;  [facility]    BRONCHOSCOPY, DILATE BRONCHUS, STENT BRONCHUS, COMBINED N/A 08/02/2018    Procedure: COMBINED BRONCHOSCOPY, DILATE BRONCHUS, STENT BRONCHUS;  Flexible Bronchoscopy, Bronchial Washings, Balloon Dilation;  Surgeon: Wilber Lin MD;  [facility]    BRONCHOSCOPY, DILATE BRONCHUS, STENT BRONCHUS, COMBINED N/A 08/20/2018    Procedure: COMBINED BRONCHOSCOPY, DILATE BRONCHUS, STENT BRONCHUS;  Flexible Bronchoscopy, Balloon Dilation;  Surgeon: Wilber Lin MD;  [facility]    BRONCHOSCOPY, DILATE BRONCHUS, STENT BRONCHUS, COMBINED N/A 10/29/2018    Procedure: Flexible Bronchoscopy, Balloon Dilation, Stent Revision, Airway Examination And Therapeutic Suctioning, Cyro Tumor Debulking;  Surgeon: Wilber Lin MD;  [facility]: [facility]    BRONCHOSCOPY, DILATE BRONCHUS, STENT BRONCHUS, COMBINED N/A 11/20/2018    Procedure: Rigid Bronchoscopy, Stent Removal and dilitation;  Surgeon: Wilber Lin MD;  [facility]    BRONCHOSCOPY, DILATE BRONCHUS, STENT BRONCHUS, COMBINED N/A 12/14/2018     Procedure: Flexible And Rigid Bronchoscopy, Balloon Dilation, Bronchial Washing;  Surgeon: Wilber Lin MD;  [facility]    BRONCHOSCOPY, DILATE BRONCHUS, STENT BRONCHUS, COMBINED N/A 01/17/2019    Procedure: Flexible And Rigid Bronchoscopy, Balloon Dilation.;  Surgeon: Wilber Lin MD;  [facility]    BRONCHOSCOPY, DILATE BRONCHUS, STENT BRONCHUS, COMBINED N/A 03/07/2019    Procedure: Flexible and Rigid Bronchoscopy, Bronchial Washing, Balloon Dilation;  Surgeon: Wilber Lin MD;  [facility]    BRONCHOSCOPY, DILATE BRONCHUS, STENT BRONCHUS, COMBINED N/A 06/06/2019    Procedure: Rigid and Flexible Bronchoscopy, Balloon Dilation;  Surgeon: Wilber Lin MD;  [facility]    BRONCHOSCOPY, DILATE BRONCHUS, STENT BRONCHUS, COMBINED N/A 10/11/2019    Procedure: Flexible and Rigid Bronchoscopy, Balloon Dilation, Bronchoalveolar Lagave;  Surgeon: Wilber Lin MD;  [facility]    BRONCHOSCOPY, DILATE BRONCHUS, STENT BRONCHUS, COMBINED N/A 02/19/2021    Procedure: BRONCHOSCOPY, flexible, airway dilation, and bronchial wash;  Surgeon: Wilber Lin MD;  [facility]    BRONCHOSCOPY, DILATE BRONCHUS, STENT BRONCHUS, COMBINED N/A 04/09/2021    Procedure: BRONCHOSCOPY, flexible and rigid, Airway suctioning;  Surgeon: Mati Norris MD;  [facility]    BRONCHOSCOPY, DILATE BRONCHUS, STENT BRONCHUS, COMBINED N/A 10/17/2023    Procedure: RIGID, flexible bronchoscopy with airway dilation;  Surgeon: Preet Patel MD;  [facility]    BRONCHOSCOPY, DILATE BRONCHUS, STENT BRONCHUS, COMBINED N/A 6/20/2024    Procedure: RIGID AND FLEXIBLE BRONCHOSCOPY, BALLOON DILATION, TISSUE DEBULKING WITH C02 LASER, STENT PLACEMENT;  Surgeon: Juana Baugh MD;  [facility]    CV RIGHT HEART CATH MEASUREMENTS RECORDED N/A 03/10/2020    Procedure: CV RIGHT HEART CATH;  Surgeon: Wai Garcia MD;  Location:  HEART CARDIAC CATH LAB    ENT SURGERY       tonsillectomy as a child    ESOPHAGOSCOPY, GASTROSCOPY, DUODENOSCOPY (EGD), COMBINED N/A 10/29/2018    Procedure: COMBINED ESOPHAGOSCOPY, GASTROSCOPY, DUODENOSCOPY (EGD) with biopsies and polypectomy;  Surgeon: Chente Bloom MD;  [facility]    INSERT EXTRACORPORAL MEMBRANE OXYGENATOR ADULT (OUTSIDE OR) N/A 02/27/2018    Procedure: INSERT EXTRACORPORAL MEMBRANE OXYGENATOR ADULT (OUTSIDE OR);  INSERT EXTRACORPORAL MEMBRANE OXYGENATOR ADULT (OUTSIDE OR) ;  Surgeon: Toby Hernandez MD;  [facility]    IR CVC TUNNEL PLACEMENT > 5 YRS OF AGE  10/25/2019    IR DIALYSIS FISTULOGRAM LEFT  03/02/2021    IR DIALYSIS FISTULOGRAM LEFT  11/02/2023    IR DIALYSIS FISTULOGRAM LEFT  03/05/2024    IR DIALYSIS MECH THROMB, PTA  03/02/2021    IR DIALYSIS PTA  11/02/2023    IR FLUORO 0-1 HOUR  05/07/2021    IR GASTRO JEJUNOSTOMY TUBE PLACEMENT  02/16/2021    IR GASTRO JEJUNOSTOMY TUBE PLACEMENT  7/5/2024    IR PARACENTESIS  01/08/2020    IR THORACENTESIS  09/13/2019    IR TRANSCATHETER BIOPSY  01/08/2020    LASER CO2 BRONCHOSCOPY N/A 04/30/2021    Procedure: Flexible and Rigid Bronchoscopy and Tissue Debulking with CO2 Laser Assistance;  Surgeon: Mati Norris MD;  [facility]    LASER CO2 BRONCHOSCOPY N/A 06/11/2021    Procedure: BRONCHOSCOPY, Flexible and Rigid Bronchoscopy, Tissue Debulking with cryo Assistance, airway dilation,;  Surgeon: Mati Norris MD;  [facility]    LASER CO2 BRONCHOSCOPY N/A 09/16/2021    Procedure: BRONCHOSCOPY, flexible and rigid, CO2 Laser Debulking;  Surgeon: Mati Norris MD;  [facility]    LASER CO2 BRONCHOSCOPY N/A 02/11/2022    Procedure: flexible, rigid bronchoscopy, tissue debulking, airway dilation, co2 laser, bronchoalveolar lavage;  Surgeon: Juana Baugh MD;  [facility]    LASER CO2 BRONCHOSCOPY Right 11/17/2023    Procedure: flexible bronchosocopy, tissue/tumor debulking, using CO2 laser, mitomycin application and argon plasma  coagulation;  Surgeon: Mati Norris MD;  [facility]    LASER CO2 BRONCHOSCOPY N/A 02/15/2024    Procedure: Flexible, Rigid Bronchoscopy,Tumor/Tissue debulking using Carbon Dioxide (CO2) laser; balloon dilation;  Surgeon: Wilber Lin MD;  [facility]    MIDLINE SINGLE LUMEN PLACEMENT Right 06/19/2024    3FR SL midline.    no prior surgery      REMOVE EXTRACORPORAL MEMBRANE OXYGENATOR ADULT N/A 03/03/2018    Procedure: REMOVE EXTRACORPORAL MEMBRANE OXYGENATOR ADULT;  Removal of Right Femoral Venous and Right Axillary Arterial Extracorporeal Membrane Oxygenator;  Surgeon: Toby Hernandez MD;  [facility]    TRACHEOSTOMY N/A 7/3/2024    Procedure: Tracheostomy;  Surgeon: Cris Liu MD;  [facility]    TRANSPLANT LUNG RECIPIENT SINGLE X2 Bilateral 03/01/2018    Procedure: TRANSPLANT LUNG RECIPIENT SINGLE X2;  Median Sternotomy, Extracorporeal Mem[employer] Oxygenator, bilateral sequential lung transplant;  Surgeon: Toby Hernandez MD;  Location: [facility] OR       Prior to Admission Medications   Prior to Admission Medications   Prescriptions Last Dose Informant Patient Reported? Taking?   Magnesium Cl-Calcium Carbonate (SLOW-MAG) 71.5-119 MG TBEC   No No   Sig: Take 2 tablets by mouth four times a week Take on non dialysis days T/Th/Sa/Su   acetaminophen (TYLENOL) 325 MG tablet  Self No No   Sig: Take 1 tablet (325 mg) by mouth every 4 hours as needed for mild pain or fever   acetylcysteine (MUCOMYST) 10 % nebulizer solution   No No   Sig: Inhale 4 mLs into the lungs 3 times daily as needed for mucolysis/respiratory distress   albuterol (PROVENTIL) (2.5 MG/3ML) 0.083% neb solution   No No   Sig: Take 1 vial (2.5 mg) by nebulization every 12 hours   amoxicillin (AMOXIL) 875 MG tablet   No No   Sig: Take 1 tablet (875 mg) by mouth 2 times daily   azithromycin (ZITHROMAX) 250 MG tablet   No No   Sig: Take 1 tablet (250 mg) by mouth daily   benzonatate (TESSALON) 100 MG capsule    No No   Sig: Take 1 capsule (100 mg) by mouth every 4 hours as needed for cough   Patient taking differently: Take 100 mg by mouth Monday, Wednesday, Friday.   cholecalciferol 50 MCG (2000 UT) CAPS   Yes No   Sig: Take 1 capsule by mouth daily On dialysis days (MWF)   fluticasone-salmeterol (ADVAIR) 250-50 MCG/ACT inhaler   No No   Sig: Inhale 1 puff into the lungs every 12 hours   magnesium oxide 400 MG CAPS   Yes No   Sig: Take 400 mg by mouth Tuesday, Thursday, Saturday, Sunday   metoprolol tartrate (LOPRESSOR) 25 MG tablet   No No   Sig: Take 1 tablet (25 mg) by mouth 2 times daily   montelukast (SINGULAIR) 10 MG tablet   No No   Sig: Take 1 tablet (10 mg) by mouth At Bedtime   multivitamin RENAL (RENAVITE RX/NEPHROVITE) 1 tablet tablet   Yes No   Sig: Take 1 tablet by mouth Tuesday, Thursday, Saturday, Sunday in evening.   pantoprazole (PROTONIX) 40 MG EC tablet   No No   Sig: Take 1 tablet (40 mg) by mouth every morning (before breakfast)   posaconazole (NOXAFIL) 100 MG EC tablet   No No   Sig: Take 3 tablets (300 mg) by mouth daily   Patient taking differently: Take 300 mg by mouth daily At noon   predniSONE (DELTASONE) 5 MG tablet   No No   Sig: Take 1 tablet (5 mg) by mouth daily   sulfamethoxazole-trimethoprim (BACTRIM) 400-80 MG tablet   No No   Sig: Take 1 tablet by mouth three times a week   Patient taking differently: Take 1 tablet by mouth three times a week MWF   tacrolimus (GENERIC) 1 mg/mL suspension   No No   Sig: Take 0.6 mLs (0.6 mg) by mouth every morning AND 0.7 mLs (0.7 mg) every evening.      Facility-Administered Medications: None        Physical Exam   Vital Signs: Temp: 97.9  F (36.6  C) Temp src: Oral BP: 118/87 Pulse: 88   Resp: (!) 33 SpO2: 100 % O2 Device: Mechanical Ventilator    Weight: 0 lbs 0 oz  Physical Exam:  General:  No acute distress, cachectic , temporal muscle wasting  Eyes:  Sclera non icteric, normal conjuctiva  Neck :  Supple, no lymphadenopathy, trach in place    Lungs: Rhonchi noted in all lung fields   CVS:  S1 and S2, regular rate and rhythem  Abdomen:  Soft, nontender, PEG in place   Musculoskeletal:  No pain or swelling.  No edema  Neuro:  Alert and oriented.  No acute deficit, speech is fluent.        Medical Decision Making       75 MINUTES SPENT BY ME on the date of service doing chart review, history, exam, documentation & further activities per the note.      Data   Imaging results reviewed over the past 24 hrs:   Recent Results (from the past 24 hour(s))   XR Chest Port 1 View    Narrative    XR CHEST PORT 1 VIEW 7/18/2024 6:30 PM      HISTORY: Evaluate stent placement    COMPARISON: Radiograph 7/11/2024. Chest CT 7/17/2024.     FINDINGS: Frontal view of the chest. Stable tracheostomy tube. Stable  right-sided bronchial stents. Prior sternotomy. Stable heart size.  Midline trachea. Improved aeration throughout the left lung. No  pneumothorax. Right chest wall/pectoral surgical clips.      Impression    IMPRESSION: Stable position of the right-sided bronchial stents.    I have personally reviewed the examination and initial interpretation  and I agree with the findings.    MEHNAZ CHAPMAN MD         SYSTEM ID:  [id number]     Most Recent 3 CBC's:  Recent Labs   Lab Test 07/19/24  0728 07/18/24  1549 07/18/24  0657 07/17/24  0607   WBC 2.6*  --  2.5* 2.4*   HGB 7.9* 10.4* 8.1* 8.0*   *  --  107* 104*     --  152 153     Most Recent 3 BMP's:  Recent Labs   Lab Test 07/19/24  1531 07/19/24  1125 07/19/24  0728 07/18/24  1721 07/18/24  1549 07/18/24  0831 07/18/24  0657 07/17/24  0744 07/17/24  0607   NA  --   --  133*  --  137  --  132*  --  133*   POTASSIUM  --   --  3.8  --  4.5  --  3.4  --  3.2*   CHLORIDE  --   --  96*  --   --   --  92*  --  93*   CO2  --   --  28  --   --   --  29  --  30*   BUN  --   --  35.0*  --   --   --  63.3*  --  39.8*   CR  --   --  1.80*  --   --   --  2.42*  --  1.65*   ANIONGAP  --   --  9  --   --   --  11  --  10    CHELSEY  --   --  8.6*  --   --   --  8.1*  --  8.2*   * 130* 90   < > 164*   < > 128*   < > 150*    < > = values in this interval not displayed.     Most Recent 2 LFT's:  Recent Labs   Lab Test 07/11/24  0518 07/02/24  0359   AST 30 26   ALT 13 37   ALKPHOS 282* 176*   BILITOTAL 0.4 0.6     Most Recent 3 INR's:  Recent Labs   Lab Test 06/18/24  1418 03/05/24  0925 02/14/24  1050   INR 0.98 0.98 0.96

## 2024-07-19 NOTE — PROGRESS NOTES
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 Nephrology consulted for management of HD while admitted.      Interval History :   Pt returned from [place] after having a panic attack and SOB. She is going back today at 1pm. She last dialyzed Thurs 7/18 and will dialyze there again tomorrow (Saturday)    Assessment & Recommendations:   ESRD-ESKD: pt dialyzes MWF at [facility] (ph [phone], f [phone]) with Dr. Glasgow. Run time: 3.5 hrs. EDW 52.5 kg. Access: L AVF. +heparin 1,500u bolus.       -HD on TTS for now                 -s/p  IR doing plasty 7/8 to fistula, temp line pulled.       Outpt Rx:       Volume-Wt as low as 49kg during her course (although may have been dry at the time as she needed some neosynephrine), EDW likely a bit lower with ICU stay ~50-51kg.      Pulm-Admitted with hypercapnic resp failure, treated for PNA. Trached, progressing well.       Electrolytes-Stable.     BMD-No acute issues.      Anemia- On Mircera 60mcg [id number], will cover with short term 4k Epo with runs while admitted when stable enough for HD.       Nutrition-[facility] renal         Recommendations were communicated to primary team via verbal communication.     JONI Lemus    Review of Systems:   I reviewed the following systems:  Gen: No fevers or chills  CV: No CP at rest  Resp: No SOB at rest  GI: No N/V    Physical Exam:   I/O last 3 completed shifts:  In: 300 [NG/GT:300]  Out: -    /62   Pulse 85   Temp 98.7  F (37.1  C) (Oral)   Resp 26   LMP 06/07/2014 (Exact Date)   SpO2 100%      GENERAL APPEARANCE: Vent via  trach   EYES: No scleral icterus  Pulmonary: Stable on vent  CV: Regular rhythm, normal rate   - Edema none  GI: soft, nontender, normal bowel sounds  MS: no evidence of inflammation in joints, no muscle tenderness  : No Basilio  SKIN: no rash, warm, dry  NEURO: No focal deficits    Labs:   All labs reviewed by me  Electrolytes/Renal -   Recent Labs   Lab Test 07/19/24  0728 07/19/24  0402 07/19/24  0034 07/18/24  1721 07/18/24  1549 07/18/24  0831 07/18/24  0657 07/17/24  0744 07/17/24  0607   *  --   --   --  137  --  132*  --  133*   POTASSIUM 3.8  --   --   --  4.5  --  3.4  --  3.2*   CHLORIDE 96*  --   --   --   --   --  92*  --  93*   CO2 28  --   --   --   --   --  29  --  30*   BUN 35.0*  --   --   --   --   --  63.3*  --  39.8*   CR 1.80*  --   --   --   --   --  2.42*  --  1.65*   GLC 90 85 74   < > 164*   < > 128*   < > 150*   CHELSEY 8.6*  --   --   --   --   --  8.1*  --  8.2*   MAG 1.6*  --   --   --   --   --  1.7  --  1.6*   PHOS 2.5  --   --   --   --   --  3.6  --  3.0    < > = values in this interval not displayed.       CBC -   Recent Labs   Lab Test 07/19/24  0728 07/18/24  1549 07/18/24  0657 07/17/24  0607   WBC 2.6*  --  2.5* 2.4*   HGB 7.9* 10.4* 8.1* 8.0*     --  152 153       LFTs -   Recent Labs   Lab Test 07/11/24  0518 07/02/24  0359 07/01/24  1637 07/01/24  0346   ALKPHOS 282* 176*  --  173*   BILITOTAL 0.4 0.6  --  0.6   ALT 13 37  --  36   AST 30 26  --  25   PROTTOTAL 5.1* 5.4*  --  5.0*   ALBUMIN 2.6* 2.8* 2.7* 2.6*       Iron Panel -   Recent Labs   Lab Test 02/03/21  0415 12/13/18  1033 08/01/18  0921   IRON 51 16* 93   IRONSAT 36 7* 37   YOLA  --  302* 571*           Current Medications:  Current Facility-Administered Medications   Medication Dose Route Frequency Provider Last Rate Last Admin    acetylcysteine (MUCOMYST) 10 % nebulizer solution 4 mL  4 mL Nebulization BID Edin Davis MD   4 mL at 07/18/24 8702    azithromycin (ZITHROMAX) half-tab 125 mg  125 mg  Per J Tube Daily Rufina Huff, CNP   125 mg at 07/19/24 0910    chlorhexidine (PERIDEX) 0.12 % solution 15 mL  15 mL Mouth/Throat Q12H Edin Davis MD   15 mL at 07/19/24 1017    fiber modular (BANATROL TF) packet 1 packet  1 packet Per Feeding Tube TID Edin Davis MD        heparin ANTICOAGULANT injection 5,000 Units  5,000 Units Subcutaneous Q8H Edin Daivs MD   5,000 Units at 07/19/24 0408    insulin aspart (NovoLOG) injection (RAPID ACTING)  1-12 Units Subcutaneous Q4H Edin Davis MD        levalbuterol (XOPENEX) neb solution 0.63 mg  0.63 mg Nebulization 4x Daily Edin Davis MD   0.63 mg at 07/18/24 2217    LORazepam (ATIVAN) tablet 1 mg  1 mg Oral Q4H Kuldip Sanches MD   1 mg at 07/19/24 0910    metoprolol tartrate (LOPRESSOR) tablet 25 mg  25 mg Per J Tube BID Edin Davis MD   25 mg at 07/19/24 0910    montelukast (SINGULAIR) tablet 10 mg  10 mg Per J Tube At Bedtime Edin Davis MD   10 mg at 07/18/24 2100    multivitamin RENAL (RENAVITE RX/NEPHROVITE) tablet 1 tablet  1 tablet Per J Tube Daily Edin Davis MD   1 tablet at 07/19/24 0910    pantoprazole (PROTONIX) 2 mg/mL suspension 40 mg  40 mg Per J Tube At Bedtime Edin Davis MD   40 mg at 07/18/24 2100    polyethylene glycol (MIRALAX) Packet 17 g  17 g Oral Daily Edin Davis MD        posaconazole (NOXAFIL) DR tablet TBEC 300 mg  300 mg Per Feeding Tube QAM Kuldip Sanches MD        predniSONE (DELTASONE) tablet 50 mg  50 mg Per J Tube Daily Edin Davis MD   50 mg at 07/19/24 0910    Prosource TF20 ENfit Compatibl EN LIQD (PROSOURCE TF20) packet 60 mL  1 packet Per Feeding Tube Daily Edin Davis MD        QUEtiapine (SEROquel) tablet 25 mg  25 mg Per J Tube At Bedtime Edin Davis MD   25 mg at 07/18/24 2100    ramelteon (ROZEREM) tablet 8 mg  8 mg Per J Tube At Bedtime Edin Davis MD   8 mg at 07/18/24 2101    sodium  chloride (NEBUSAL) 3 % neb solution 3 mL  3 mL Nebulization BID Edin Davis MD   3 mL at 07/18/24 2217    sulfamethoxazole-trimethoprim (BACTRIM) 400-80 MG per tablet 1 tablet  1 tablet Per J Tube Once per day on Monday Wednesday Friday Edin Davis MD        tacrolimus (GENERIC) suspension 1.5 mg  1.5 mg Per G Tube BID IS Edin Davis MD   1.5 mg at 07/19/24 0912    traZODone (DESYREL) half-tab 25 mg  25 mg Per J Tube At Bedtime Edin Davis MD   25 mg at 07/18/24 2101    [START ON 7/20/2024] valGANciclovir (VALCYTE) solution 450 mg  450 mg Per J Tube Once per day on Tuesday Saturday Edin Davis MD        Vitamin D3 (CHOLECALCIFEROL) tablet 50 mcg  50 mcg Per J Tube Once per day on Monday Wednesday Friday Edin Davis MD         Current Facility-Administered Medications   Medication Dose Route Frequency Provider Last Rate Last Admin    dextrose 10% infusion   Intravenous Continuous PRN Edin Davis MD        heparin 10,000 units/10 mL infusion (DIALYSIS USE)  500 Units/hr Hemodialysis Machine Continuous Kuldip Sanches MD        Stop Heparin 60 minutes before end of treatment   Does not apply Continuous PRN Kuldip Sanches MD

## 2024-07-19 NOTE — PROGRESS NOTES
CLINICAL NUTRITION SERVICES - ASSESSMENT NOTE     Nutrition Prescription    RECOMMENDATIONS FOR MDs/PROVIDERS TO ORDER:  None currently     Malnutrition Status:    Moderate malnutrition in the context of acute illness/disease    Recommendations already ordered by Registered Dietitian (RD):  EN access: PEGJ   Goal TF: Novasource Renal (or equivalent) @ 30 ml/hr (720 ml) + 1 Prosource Tf20 provides 1520 kcal (29 kcal/kg), 85 g pro (1.6 g/kg), 132 g CHO, 516 ml free water, and 0 g fiber daily    Initiate @ 10 ml/hr and advance by 10 ml Q8H pending pt's tolerance.  Do not advance TF rate unless Mg++ > 1.5, K+ is > 3, and phos > 1.9  30 ml Q4H fluid flushes for tube patency. Additional fluids and/or adjustments per MD.    Multivitamin/minerals to help ensure micronutrient needs being met with suspected hypermetabolic demands and potential interruptions to TF infusions.   Guidelines for open EN support system:  Instructions for RN - copy & paste in Goal Rate or Advancement Instruction (comments) line:   Please abide by 8-hour hang-time for food safety. Pour 237 mL of formula into TF pump bag per 8-hour batch. If less than full cartons used for batch pour, discard excess formula unless otherwise directed by RD.    Number of cartons for NSA to send - copy & paste into Amount to Send (Nutrition Use):  Please send 3 cartons daily - this accounts for discarded formula per batch.     Future/Additional Recommendations:  Monitor TF intakes  Monitor stool output  Monitor weight      REASON FOR ASSESSMENT  Kecia BRODERICK Blue is a/an 63 year old female assessed by the dietitian for Provider Order - Registered Dietitian to Assess and Order TF per Medical Nutrition Therapy Protocol    Patient admitted after attempt to discharge patient to [facility] 7/18 but patient returned to [facility] for acute on chronic hypoxic respiratory failure.     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  Status post airway stent placement by IP on 6/20. Hospital course complicated by hypotension, delirium, and prolonged respiratory failure. Tracheostomy placement on 7/3 and PEGJ tube placement with IR on 7/5. Now persistent respiratory failure with minimal progress on PST, treating empirically for immune/inflammatory process, on steroid taper schedule.     NUTRITION HISTORY  Pt has been stable on Novasource Renal (or equivalent) @ 30 ml/hr (720 ml) + 1 Prosource Tf20 provides 1520 kcal (29 kcal/kg), 85 g pro (1.6 g/kg), 132 g CHO, 516 ml free water, and 0 g fiber daily since 7/03/24. Pt had times of loose stools but this had resolved. Pt was on Banatrol TF 1 pkt Q8H.     CURRENT NUTRITION ORDERS  Diet: NPO + TF      LABS   07/18/24 06:57 07/18/24 15:49 07/19/24 07:28   Sodium 132 (L)  133 (L)   Sodium POCT  137    Potassium 3.4  3.8   Potassium POCT  4.5    Chloride 92 (L)  96 (L)   Carbon Dioxide (CO2) 29  28   Urea Nitrogen 63.3 (H)  35.0 (H)   Creatinine 2.42 (H)  1.80 (H)   GFR Estimate 22 (L)  32 (L)   Calcium 8.1 (L)  8.6 (L)   Anion Gap 11  9   Magnesium 1.7  1.6 (L)   Phosphorus 3.6  2.5   Calcium, Ionized Whole Blood POCT  5.2    CK Total 37     Glucose 128 (H)  90       MEDICATIONS  Banatrol TF 1 pkt TID  SSI Q4H  Renal multivitamin   Protonix  Miralax daily  Prednisone  Bactrim  Tacrolimus  Valcyte  Vitamin D3 50 mcg MWF    ANTHROPOMETRICS  Height: 160 cm (5' 3\")  Most Recent Weight:      IBW: 52.3 kg   BMI: 19.80 kg/m2; BMI Category: Normal BMI  Weight History:  3.7 kg (6.8%) weight loss over 2 months.  Wt Readings from Last 10 Encounters:   07/18/24 50.7 kg (111 lb 12.4 oz)   05/16/24 54.4 kg (120 lb)   03/13/24 55.3 " kg (122 lb)   03/05/24 57.7 kg (127 lb 4.8 oz)   02/15/24 58 kg (127 lb 13.9 oz)   12/18/23 58.1 kg (128 lb)   12/13/23 58.4 kg (128 lb 11.2 oz)   11/29/23 55.7 kg (122 lb 12.7 oz)   11/21/23 58.7 kg (129 lb 6.4 oz)   11/17/23 57.8 kg (127 lb 6.8 oz)         ASSESSED NUTRITION NEEDS  Dosing Weight: 52.3 kg (IBW)   Estimated Energy Needs: 7384-1076 kcals/day (25 - 30 kcals/kg)  Justification: Increased needs  Estimated Protein Needs:  grams protein/day (1.5 - 2 grams of pro/kg)  Justification: Increased needs  Estimated Fluid Needs: UOP + 500-1000 mL/day)   Justification: Per provider pending fluid status and Dialysis    PHYSICAL FINDINGS  See malnutrition section below.    Marcus Score: 16  Per EMR: Skin  Skin WDL: .WDL except  Skin Color: redness blanchable, pale  Redness blanchable location: Sacrum/Coccyx, Buttocks/IT  Skin Temperature: warm  Skin Moisture: dry  Skin Elasticity: quick return to original state  Skin Integrity: bruised (ecchymotic)  Bruised (ecchymotic) location: Other (Comment) (scattered)  Device Skin Interventions Taken: No adjustments needed    MALNUTRITION  % Intake: No decreased intake noted  % Weight Loss: Weight loss does not meet criteria for malnutrition   Subcutaneous Fat Loss: Facial region:  mild, Upper arm:  mild, and Lower arm:  mild   Muscle Loss: Temporal:  mild, Thoracic region (clavicle, acromium bone, deltoid, trapezius, pectoral):  mild, Upper arm (bicep, tricep):  mild, Lower arm  (forearm):  mild, and Dorsal hand:  mild  Fluid Accumulation/Edema: None noted  Malnutrition Diagnosis: Moderate malnutrition in the context of acute illness/disease    NUTRITION DIAGNOSIS  Inadequate oral intake related to oxygen needs as evidenced by NPO with nutrition support needed to meet nutrition needs.      INTERVENTIONS  Implementation  Nutrition Education: will be provided if nutrition education needs arise.    Collaboration with other providers  Enteral Nutrition - Initiate  Feeding  "tube flush  Nutrition-related medication management     Goals  Total avg nutritional intake to meet a minimum of 25 kcal/kg and 1.5 g PRO/kg daily (per dosing wt 52.3 kg).        Monitoring/Evaluation  Progress toward goals will be monitored and evaluated per protocol.    Olesya Velasco MS, RDN, LD  4C MICU RD  Vocera - \"4C Clinical Dietitian\"  Weekend/Holiday RD - \"Weekend Clinical Dietitian\"    "

## 2024-07-19 NOTE — PLAN OF CARE
Goal Outcome Evaluation:  ICU End of Shift Summary. See flowsheets for vital signs and detailed assessment.    Changes this shift: Alert and oriented. Tele SR, in A fib CVR briefly overnight. Afebrile. Vent settings cmv 30 %, 18, 320, 5. Anuric d/t HD. No tube feed overnight-pending nutrition orders. Denies chest pain. Headache managed with 1 time dose of tylenol.     Plan: transfer to [facility] or discharge to LTACH      Plan of Care Reviewed With: patient    Overall Patient Progress: no changeOverall Patient Progress: no change

## 2024-07-19 NOTE — PHARMACY-ADMISSION MEDICATION HISTORY
Pharmacy Note: LTACH Admission Drug Regimen Review    Initial admission medication history was completed at [facility]. Please see Pharmacy - Admission Medication History note from 06/18/2024.    Medication orders were signed & held for discharge from transferring facility? Yes    Changes made to PTA medication list:  Added:   multivitamin RENAL (RENAVITE RX/NEPHROVITE) 1 tablet tablet  magnesium oxide 400 MG CAPS  Deleted:   Guanifenesin (mucinex) 600 mg 12 hr tablet   Changed:   Benzonate 100 mg capsule prn --> MWF    No outpatient medications have been marked as taking for the 7/19/24 encounter (Hospital Encounter).       Admission drug regimen review has been completed. Pertinent information or medication issues identified include:    We will need Pharmacy Inpatient Consult - LTACH Immunosuppressant (tacrolimus)  Patient receives EPO with HD, will need to be ordered along with the rest of HD orders for HD tomorrow 7/20.   Monitor Seroquel use for oversedation given drug interactions.       The following PTA medications were not continued during hospitalization at [facility]: Benzonatate.   Please assess whether a future restart would be appropriate.       Thank you for the opportunity to participate in the care of this patient.    Shree Martin RPH  7/19/2024 3:41 PM

## 2024-07-19 NOTE — CONSULTS
Care Management Initial Consult    General Information  Assessment completed with: Patient, Spouse or significant other, Ranjan  Type of CM/SW Visit: Initial Assessment    Primary Care Provider verified and updated as needed: Yes   Readmission within the last 30 days: other (see comments)   Return Category: Exacerbation of disease     Advance Care Planning: Advance Care Planning Reviewed: no concerns identified          Communication Assessment  Patient's communication style: spoken language (English or Bilingual)    Hearing Difficulty or Deaf: yes   Wear Glasses or Blind: yes    Cognitive  Cognitive/Neuro/Behavioral: WDL  Level of Consciousness: alert  Arousal Level: opens eyes spontaneously  Orientation: oriented x 4  Mood/Behavior: calm, cooperative  Best Language:  (leodan)  Speech: trached    Living Environment:   People in home: child(mayito), adult, spouse     Current living Arrangements: house      Able to return to prior arrangements: yes (after  LTACH)       Family/Social Support:  Care provided by: spouse/significant other, self, child(mayito)  Provides care for: child(mayito)  Marital Status:   Children,   Ranjan       Description of Support System: Supportive, Involved    Support Assessment: Adequate family and caregiver support, Adequate social supports    Current Resources:   Patient receiving home care services:  DME        Equipment currently used at home: grab bar, toilet, grab bar, tub/shower, shower chair, wheelchair, manual, cane, straight  Does the patient's insurance plan have a 3 day qualifying hospital stay waiver?  No    Lifestyle & Psychosocial Needs:  Social Determinants of Health     Food Insecurity: No Food Insecurity (9/20/2022)    [facility]    Hunger Vital Sign     Worried About Running Out of Food in the Last Year: Never true     Ran Out of Food in the Last Year: Never true   Depression: Not at risk (3/13/2024)     PHQ-2     PHQ-2 Score: 0   Housing Stability: Unknown (9/20/2022)    Received from [facility], [facility]    Housing Stability     In the last 12 months, was there a time when you were not able to pay the mortgage or rent on time?: No     Number of Places Lived in the Last Year: Not on file     Number of Places Lived in the Last Year (Outpatient): Not on file     In the last 12 months, was there a time when you did not have a steady place to sleep or slept in a shelter (including now)?: No   Tobacco Use: Low Risk  (5/16/2024)    Patient History     Smoking Tobacco Use: Never     Smokeless Tobacco Use: Never     Passive Exposure: Not on file   Financial Resource Strain: Low Risk  (9/20/2022)    [facility]    Overall Financial Resource Strain (CARDIA)     Difficulty of Paying Living Expenses: Not hard at all   Alcohol Use: Not At Risk (9/20/2022)    Received from [facility]    AUDIT-C     Frequency of Alcohol Consumption: Monthly or less     Average Number of Drinks: 1 or 2     Frequency of Binge Drinking: Never   Transportation Needs: No Transportation Needs (9/20/2022)    Received from [facility]    PRAPARE - Transportation     Lack of Transportation (Medical): No     Lack of Transportation (Non-Medical): No   Physical Activity: Inactive (9/20/2022)    [facility] from [facility]    Exercise Vital Sign     Days of Exercise per Week: 0 days     Minutes of Exercise per Session: 0 min   Interpersonal Safety: Not At Risk (4/15/2024)    Received from [facility]    Intimate Partner Violence     Are you in a relationship where you are physically hurt, threatened and/or made to feel afraid?: No   Stress: No Stress Concern  "Present (9/20/2022)    [facility]    [employer] - Occupational Stress Questionnaire     Feeling of Stress : Not at all   Social Connections: Moderately Integrated (9/20/2022)    [facility], [facility]    Social Connection and Isolation Panel [NHANES]     Frequency of Communication with Friends and Family: Twice a week     Frequency of Social Gatherings with Friends and Family: Twice a week     Attends Jew Services: More than 4 times per year     Active Member of Clubs or Organizations: No     Attends Club or Organization Meetings: Not on file     Marital Status:    Health Literacy: Not on file       Functional Status:       Mental Health Status:  no mental health concerns reported.         Chemical Dependency Status: No chemical dependency concerns reported.         Additional Information:    Per Dr. Davis's Assessment on 7/18 \"Kecia Blue remains critically ill with acute hypoxic respiratory failure with slow ventilatory weaning. She was briefly discharged to [facility] this afternoon but was re-admitted due to respiratory distress with tachypnea and hypoxia, hypertension, and tachycardia concerning for worsening respiratory failure. She was hemodynamically stable upon re-admission without acute respiratory distress.\"     RNCC met with the patient and  at the bedside. No changes for assessment from the previous hospitalization.  Per Medical team patient is stable for the transfer to [facility].     Per Nephrology team patient was dialyzed on 7/18 and will get HD at [facility] on 7/20/24.       Care Management Discharge Note    Discharge Date: 07/20/2024       Discharge Disposition:  [facility]    Discharge Services: respiratory / rehab     Discharge DME:  vent     Discharge Transportation:  [facility]    Private pay costs discussed:  Per " insurance contact. LTACH got authorization.     Does the patient's insurance plan have a 3 day qualifying hospital stay waiver?  No    PAS Confirmation Code:  NO  Patient/family educated on Medicare website which has current facility and service quality ratings:  N/a     Education Provided on the Discharge Plan:  yes  Persons Notified of Discharge Plans:  , RN, patient, LTACH team  Patient/Family in Agreement with the Plan:    Yes   Handoff Referral Completed:  by RN to RN and MD to MD    Additional Information:     [facility] LTACH admissions, came to bedside to discuss transfer concerns. Patient has been reviewed By Dr. Hardy. Provided MD contact information to call MD to MD report.   Provided number for RN to RN report [phone]    Arranged ALS transportation with [facility] ( [phone]) for 1 pm.Vemt settings , FIO2 30%, PEEp 5, RR 18, Contact isolation, telemetry monitoring.     Requested to Call RNCC with anticipated time for arrival so that ativan can be administered.     Also discussed with [facility] that patient will need 10-15 minutes to adjust on their vent before transportation and requested if possible to cool down the ambulance inside. Requested to consider, if patient's  can travel with the patient.     [facility] pending 911 calls in community.     PCS form completed and left with RN.     Saima Lucio, MSN, MA, CCM, RN  4C/4A/4E ICU Care Coordinator  Phone:[phone]  Pager: [phone]    For Weekend & Holiday on call RN Care Coordinator:  [facility] ([phone]) Saturday & Sunday; ([phone]) [facility] Recognized Holidays   Weekend 4C/4A/4E ICUs /6A Care Coordinator  Pager: [phone]

## 2024-07-19 NOTE — UTILIZATION REVIEW
"Admission Status; Secondary Review Determination      Under the authority of the Utilization Management Committee, the utilization review process indicated a secondary review on the above patient.  The review outcome is based on review of the medical records, discussions with staff, and applying clinical experience noted on the date of the review.        (x) Observation Status Appropriate - This patient does not meet hospital inpatient criteria and is placed in observation status. If this patient's primary payer is Medicare and was admitted as an inpatient, Condition Code 44 should be used and patient status changed to \"observation\".      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 She underwent interventional bronchoscopy on 6/20 with laser tissue debulking and stent placement, followed by continuous renal replacement therapy from 6/20 to 7/2, and resumed intermittent hemodialysis on 7/4. She experienced recurrence of paroxysmal atrial fibrillation with rapid ventricular response on 6/25, had a tracheostomy on 7/3, and a gastrojejunostomy tube placed on 7/5. She was treated empirically for a possible immune/inflammatory process versus organizing pneumonia with a steroid burst/taper. Gradual improvement in " ventilator settings was noted, and she was discharged to a long-term acute care hospital ([facility]) on 7/18 for continued ventilator weaning. She was sent back for hospital evaluation due to a severe panic attack shortly after arrival at [facility]. She responded well to IV and PO lorazepam.    This patient s need for a short hospitalization is supported by her stable ventilator settings and the primary issue being a severe panic attack, which can be managed with appropriate anxiolytic intervention. Her underlying conditions and recent interventions have been effectively managed.     The severity of illness, intensity of service provided, expected LOS and risk for adverse outcome make the care appropriate for further observation; however, doesn't meet criteria for hospital inpatient admission. This was discussed with attending physician who concurred with this determination.     The information on this document is developed by the utilization review team in order for the business office to ensure compliance.  This only denotes the appropriateness of proper admission status and does not reflect the quality of care rendered.         The definitions of Inpatient Status and Observation Status used in making the determination above are those provided in the CMS Coverage Manual, Chapter 1 and Chapter 6, section 70.4.      Sincerely,      Zeus Richard MD, MPH  [facility]  Office # [phone]

## 2024-07-19 NOTE — CONSULTS
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  The patient was admitted on 24 for progressive hypoxia with dyspnea and worsening hypercapnia in ED requiring intubation likely d/t post-obstructive PNA 2/2 right bronchus stenosis.  S/p IP bronch () with laser tissue debulking RMB stenosis, airway balloon dilation of RMB and BI, and placement of stent RMB to BI and bifurcating stent in RUL.  S/p CRRT (-), iHD resumed .  Recurrence of paroxysmal afib with RVR first noted . S/p trach with ENT on 7/3 and GJ tube with IR on .  Treating empirically for possible immune/inflammatory process vs organizing pneumonia (given GG changes on CT) with steroid burst/taper.  Gradual improvement in PST on ventilator.  Discharge to [facility]ACH  for continued vent weaning.  Pt. with notable anxiety en route with fulminant panic attack [facility]  Pt. immediately referred back to [facility] (in less than 2 hours and prior to evaluation of anxiolytic intervention).  Vent settings remained stable throughout transfer and upon return.  Plan to return to LTACH today pending bed availability.      Acute on chronic hypoxic/hypercapneic respiratory failure:  Right bronchial stenosis with  prior RML collapse and post-obstructive multi-lobar PNA:   Possible : See prior note from 7/18 for full details of recent complex admission. Respiratory cultures with Steno, VSE, VRE, Aspergillus ochraceus, and Elidia guilliermondii.  S/p trach with ENT on 7/3 and GJ tube with IR on 7/5.  S/p stress dose steroids 6/21-7/5.  Chest CT (7/8) with waxing and waning mostly improved right lung consolidation and new diffuse GGO >BLL, increased GGO in KONRAD.  Steroid burst started 7/11 with notable improvement in PST and chest tightness.  Chest CT (7/17) with improvement in bibasilar predominant GGO (personally reviewed).  - Nebs: levalbuterol QID, Mucomyst 10% BID alternating with 3% HTS (6/22), continuing per IP for stent  - Prednisone taper as follows:  Date Daily dose (mg)   7/11 50   7/25 45   8/8 40   8/22 35   9/5 30   9/19 25   10/17 20   11/14 15   12/12 10   1/9 5      S/p bilateral sequential lung transplant (BSLT) for ILD:   Progressive CLAD:   Possible ACR: Repeat Prospera remains high at 2.26 on 7/3 possibly related to recent infection, now on steroids.  ImmuKnow remains low at 89 on 7/8, defer IST adjustment at this time.  - PTA Singulair  - Azithromycin 125 mg daily (resumed 7/18 at lower dose, prior held 7/10 for prolonged QTc), repeat EKG in 2-3 days  - Prolonged steroid taper as above     Immunosuppression:  On 2-drug IST since November d/t leukopenia and recurrent infections  - Tacrolimus 1.5 mg qPM (increased 7/18) to be via G tube exclusively.  Goal level 8-10.  Follow levels qMTh.  - Prednisone 5 mg daily to be resumed 1/9/25     Prophylaxis:   - Bactrim for PJP ppx (renally dosed)     ID:      Aspergillus ochraceus:  H/o Aspergillus empyema: S/p empyema drainage and ampho B instillation.  Previously on voriconazole, transitioned to posaconazole and managed by transplant ID.  Calcified fungal elements remain with additional recurrent effusion (likely associated with fluid disturbances r/t iHD), but  surgical intervention previously deferred d/t risk.    - Susceptibilities requested on Aspergillus from 6/19 bronch culture per ID, pending  - Posaconazole 300 mg daily chronically per transplant ID (signed off 7/9) with plan for transplant ID follow up ~6-8 weeks      H/o CMV viremia: Recurrent CMV viremia historically.  VGCV ppx most recently stopped 4/12.  CMV now negative since 7/9.  - CMV weekly monitoring (qTuesday) and VGCV ppx (6/24, renally dosed) with increased steroid courses, duration TBD     EBV viremia: S/p rituximab 12/13/23 x1 dose.  Most recent EBV negative 6/18.     ESRD on iHD: Kidney evaluation started as OP.  On qMWF iHD schedule, no missed sessions.  Transitioned to CRRT on 6/20, stopped 7/2 and iHD resumed 7/4.  Management per renal and MICU.     Pancytopenia: WBC 2.2-3.2 since 7/7.  Platelets down to 45 on 7/9.  On VGCV as above.  Also intermittently requiring pRBC.  Peripheral smear (7/9) with moderate normochromic, normocytic anemia with no increase in erythrocyte regeneration, slight leukopenia, marked thrombocytopenia with normal platelet morphology, and no overt evidence of dysplasia.     We appreciate the excellent care provided by the MICU team.  Recommendations communicated via in person rounding and this note.  Will continue to follow along closely, please do not hesitate to call with any questions or concerns.     Patient discussed with Dr. James.    Hina Huff, DNP, APRN, CNP  Inpatient Nurse Practitioner  Pulmonary CF/Transplant     Chief Complaint:     Anxiety, panic attack    History of Present Illness:     History obtained from patient, spouse, and chart.    Pt. was [facility] from [facility] to [facility]) yesterday afternoon, arrived to [facility] at 1440 per chart review.  En route with EMS pt. noted to have progressive anxiety and distress (pt. reported high temperature in the ambulance, claustrophobia, and difficult tolerating rear facing).  On arrival noted to be tachycardic  and tachypneic.  Some note of respiratory instability at LTACH, but RT note from that time also notes unchanged vent settings (CMV 18/320/5/30) without hypoxia.  Pt. also noted to be hypertensive, but readings notably discrepant between legs, given one time dose of hydralazine.    Per chart review, the patient was transitioned to [facility] readmission after ~80 minutes at rehab, around the same time patient was administered 0.5 mg lorazepam IV x1 dose.  EMS transport services arrived shortly after for readmission, but per pt's  the efficacy of the anxiolytic was just then starting to take effect and pt. was calm and stable upon arrival back to [facility].  Has remained stable overnight.     Review of Systems:     Complete ROS negative except as noted in HPI.    Medical and Surgical History:     Past Medical History:   Diagnosis Date    Acute on chronic respiratory failure with hypoxia (H) 02/21/2018    Anisocoria     Antisynthetase syndrome (H24) 2014    Anxiety     Aspergillus (H)     Aspergillus pneumonia (H) 11/20/2020    Benign essential hypertension     C. difficile colitis     Cytomegalovirus (CMV) viremia (H)     Dermatomyositis (H)     Dysplasia of cervix, low grade (ESTRADA 1)     EBV (Franklin-Barr virus) viremia     ESRD (end stage renal disease) on dialysis (H)     ILD (interstitial lung disease) (H)     Lung replaced by transplant (H)     Osteopenia     Paroxysmal atrial fibrillation (H)     Pneumocystis jiroveci pneumonia (H)     PONV (postoperative nausea and vomiting)     Post-menopause     Pulmonary hypertension (H)     Raynaud's disease     Seronegative rheumatoid arthritis (H)      Past Surgical History:   Procedure Laterality Date    BRONCHOSCOPY (RIGID OR FLEXIBLE), DIAGNOSTIC N/A 04/10/2018    Procedure: COMBINED BRONCHOSCOPY (RIGID OR FLEXIBLE), LAVAGE;;  Surgeon: Mariposa Donohue MD;  Location:  GI    BRONCHOSCOPY (RIGID OR FLEXIBLE), DIAGNOSTIC N/A 12/23/2020    Procedure: BRONCHOSCOPY, WITH  BRONCHOALVEOLAR LAVAGE;  Surgeon: Uri Bass MD;  [facility]    BRONCHOSCOPY (RIGID OR FLEXIBLE), DIAGNOSTIC N/A 05/26/2022    Procedure: BRONCHOSCOPY, WITH BRONCHOALVEOLAR LAVAGE;  Surgeon: Uri Bass MD;  Location: [facility]    BRONCHOSCOPY (RIGID OR FLEXIBLE), DIAGNOSTIC N/A 08/17/2023    Procedure: BRONCHOSCOPY, WITH BRONCHOALVEOLAR LAVAGE;  Surgeon: Esau Bruno MD;  Location: [facility]    BRONCHOSCOPY (RIGID OR FLEXIBLE), DIAGNOSTIC N/A 11/01/2023    Procedure: BRONCHOSCOPY, WITH BRONCHOALVEOLAR LAVAGE;  Surgeon: Beto Magaña DO;  Location: [facility]    BRONCHOSCOPY (RIGID OR FLEXIBLE), DILATE BRONCHUS / TRACHEA N/A 10/11/2018    Procedure: BRONCHOSCOPY (RIGID OR FLEXIBLE), DILATE BRONCHUS / TRACHEA;  Flexible And Rigid Bronchoscopy and Dilation;  Surgeon: Wilber Lin MD;  [facility]    BRONCHOSCOPY (RIGID OR FLEXIBLE), DILATE BRONCHUS / TRACHEA N/A 11/01/2023    Procedure: Bronchoscopy (Rigid Or Flexible), Dilate Bronchus / Trachea;  Surgeon: Beto Magaña DO;  [facility]    BRONCHOSCOPY FLEXIBLE N/A 03/13/2018    Procedure: BRONCHOSCOPY FLEXIBLE;  Flexible Bronchoscopy ;  Surgeon: Gissell Sanchez MD;  [facility]    BRONCHOSCOPY FLEXIBLE N/A 05/09/2018    Procedure: BRONCHOSCOPY FLEXIBLE;;  Surgeon: Wilber Lin MD;  [facility]    BRONCHOSCOPY FLEXIBLE AND RIGID N/A 09/10/2018    Procedure: BRONCHOSCOPY FLEXIBLE AND RIGID;  Flexible and Rigid Bronchoscopy with Balloon Dilation, tissue debulking with cryo, and Right mainstem bronchus stent placement;  Surgeon: Wilber Lin MD;  [facility]    BRONCHOSCOPY RIGID N/A 06/06/2018    Procedure: BRONCHOSCOPY RIGID;;  Surgeon: Lopez Macias MD;  [facility]    BRONCHOSCOPY, DILATE BRONCHUS, STENT BRONCHUS, COMBINED N/A 06/11/2018    Procedure: COMBINED BRONCHOSCOPY, DILATE BRONCHUS, STENT BRONCHUS;  Flexible Bronchoscopy, Balloon Dilation, Bronchial Washings;   Surgeon: Wilber Lin MD;  [facility]    BRONCHOSCOPY, DILATE BRONCHUS, STENT BRONCHUS, COMBINED Right 07/10/2018    Procedure: COMBINED BRONCHOSCOPY, DILATE BRONCHUS, STENT BRONCHUS;  Flexible Bronchoscopy, Balloon Dilation, Bronchial Washings  ;  Surgeon: Wilber Lin MD;  [facility]    BRONCHOSCOPY, DILATE BRONCHUS, STENT BRONCHUS, COMBINED N/A 08/02/2018    Procedure: COMBINED BRONCHOSCOPY, DILATE BRONCHUS, STENT BRONCHUS;  Flexible Bronchoscopy, Bronchial Washings, Balloon Dilation;  Surgeon: Wilber Lin MD;  [facility]    BRONCHOSCOPY, DILATE BRONCHUS, STENT BRONCHUS, COMBINED N/A 08/20/2018    Procedure: COMBINED BRONCHOSCOPY, DILATE BRONCHUS, STENT BRONCHUS;  Flexible Bronchoscopy, Balloon Dilation;  Surgeon: Wilber Lin MD;  [facility]    BRONCHOSCOPY, DILATE BRONCHUS, STENT BRONCHUS, COMBINED N/A 10/29/2018    Procedure: Flexible Bronchoscopy, Balloon Dilation, Stent Revision, Airway Examination And Therapeutic Suctioning, Cyro Tumor Debulking;  Surgeon: Wilber Lin MD;  [facility]    BRONCHOSCOPY, DILATE BRONCHUS, STENT BRONCHUS, COMBINED N/A 11/20/2018    Procedure: Rigid Bronchoscopy, Stent Removal and dilitation;  Surgeon: Wilber Lin MD;  [facility]    BRONCHOSCOPY, DILATE BRONCHUS, STENT BRONCHUS, COMBINED N/A 12/14/2018    Procedure: Flexible And Rigid Bronchoscopy, Balloon Dilation, Bronchial Washing;  Surgeon: Wilber Lin MD;  [facility]    BRONCHOSCOPY, DILATE BRONCHUS, STENT BRONCHUS, COMBINED N/A 01/17/2019    Procedure: Flexible And Rigid Bronchoscopy, Balloon Dilation.;  Surgeon: Wilber Lin MD;  [facility]    BRONCHOSCOPY, DILATE BRONCHUS, STENT BRONCHUS, COMBINED N/A 03/07/2019    Procedure: Flexible and Rigid Bronchoscopy, Bronchial Washing, Balloon Dilation;  Surgeon: Wilber Lin MD;  [facility]    BRONCHOSCOPY, DILATE BRONCHUS, STENT BRONCHUS,  COMBINED N/A 06/06/2019    Procedure: Rigid and Flexible Bronchoscopy, Balloon Dilation;  Surgeon: Wilber Lin MD;  [facility]    BRONCHOSCOPY, DILATE BRONCHUS, STENT BRONCHUS, COMBINED N/A 10/11/2019    Procedure: Flexible and Rigid Bronchoscopy, Balloon Dilation, Bronchoalveolar Lagave;  Surgeon: Wilber Lin MD;  [facility]    BRONCHOSCOPY, DILATE BRONCHUS, STENT BRONCHUS, COMBINED N/A 02/19/2021    Procedure: BRONCHOSCOPY, flexible, airway dilation, and bronchial wash;  Surgeon: Wilber Lin MD;  [facility]    BRONCHOSCOPY, DILATE BRONCHUS, STENT BRONCHUS, COMBINED N/A 04/09/2021    Procedure: BRONCHOSCOPY, flexible and rigid, Airway suctioning;  Surgeon: Mati Norris MD;  [facility]    BRONCHOSCOPY, DILATE BRONCHUS, STENT BRONCHUS, COMBINED N/A 10/17/2023    Procedure: RIGID, flexible bronchoscopy with airway dilation;  Surgeon: Preet Patel MD;  [facility]    BRONCHOSCOPY, DILATE BRONCHUS, STENT BRONCHUS, COMBINED N/A 6/20/2024    Procedure: RIGID AND FLEXIBLE BRONCHOSCOPY, BALLOON DILATION, TISSUE DEBULKING WITH C02 LASER, STENT PLACEMENT;  Surgeon: Juana Baugh MD;  [facility]    CV RIGHT HEART CATH MEASUREMENTS RECORDED N/A 03/10/2020    Procedure: CV RIGHT HEART CATH;  Surgeon: Wai Garcia MD;  [facility]    ENT SURGERY      tonsillectomy as a child    ESOPHAGOSCOPY, GASTROSCOPY, DUODENOSCOPY (EGD), COMBINED N/A 10/29/2018    Procedure: COMBINED ESOPHAGOSCOPY, GASTROSCOPY, DUODENOSCOPY (EGD) with biopsies and polypectomy;  Surgeon: Chente Bloom MD;  [facility]    INSERT EXTRACORPORAL MEMBRANE OXYGENATOR ADULT (OUTSIDE OR) N/A 02/27/2018    Procedure: INSERT EXTRACORPORAL MEMBRANE OXYGENATOR ADULT (OUTSIDE OR);  INSERT EXTRACORPORAL MEMBRANE OXYGENATOR ADULT (OUTSIDE OR) ;  Surgeon: Toby Hernandez MD;  [facility]    IR CVC TUNNEL PLACEMENT > 5 YRS OF AGE  10/25/2019     IR DIALYSIS FISTULOGRAM LEFT  03/02/2021    IR DIALYSIS FISTULOGRAM LEFT  11/02/2023    IR DIALYSIS FISTULOGRAM LEFT  03/05/2024    IR DIALYSIS MECH THROMB, PTA  03/02/2021    IR DIALYSIS PTA  11/02/2023    IR FLUORO 0-1 HOUR  05/07/2021    IR GASTRO JEJUNOSTOMY TUBE PLACEMENT  02/16/2021    IR GASTRO JEJUNOSTOMY TUBE PLACEMENT  7/5/2024    IR PARACENTESIS  01/08/2020    IR THORACENTESIS  09/13/2019    IR TRANSCATHETER BIOPSY  01/08/2020    LASER CO2 BRONCHOSCOPY N/A 04/30/2021    Procedure: Flexible and Rigid Bronchoscopy and Tissue Debulking with CO2 Laser Assistance;  Surgeon: Mati Norris MD;  [facility]    LASER CO2 BRONCHOSCOPY N/A 06/11/2021    Procedure: BRONCHOSCOPY, Flexible and Rigid Bronchoscopy, Tissue Debulking with cryo Assistance, airway dilation,;  Surgeon: Mati Norris MD;  [facility]    LASER CO2 BRONCHOSCOPY N/A 09/16/2021    Procedure: BRONCHOSCOPY, flexible and rigid, CO2 Laser Debulking;  Surgeon: Mati Norris MD;  [facility]    LASER CO2 BRONCHOSCOPY N/A 02/11/2022    Procedure: flexible, rigid bronchoscopy, tissue debulking, airway dilation, co2 laser, bronchoalveolar lavage;  Surgeon: Juana Baugh MD;  [facility]    LASER CO2 BRONCHOSCOPY Right 11/17/2023    Procedure: flexible bronchosocopy, tissue/tumor debulking, using CO2 laser, mitomycin application and argon plasma coagulation;  Surgeon: Mati Norris MD;  [facility]    LASER CO2 BRONCHOSCOPY N/A 02/15/2024    Procedure: Flexible, Rigid Bronchoscopy,Tumor/Tissue debulking using Carbon Dioxide (CO2) laser; balloon dilation;  Surgeon: Wilber Lin MD;  [facility]    MIDLINE SINGLE LUMEN PLACEMENT Right 06/19/2024    3FR SL midline.    no prior surgery      REMOVE EXTRACORPORAL MEMBRANE OXYGENATOR ADULT N/A 03/03/2018    Procedure: REMOVE EXTRACORPORAL MEMBRANE OXYGENATOR ADULT;  Removal of Right Femoral Venous and Right Axillary Arterial Extracorporeal Membrane Oxygenator;   Surgeon: Toby Hernandez MD;  [facility]    TRACHEOSTOMY N/A 7/3/2024    Procedure: Tracheostomy;  Surgeon: Cris Liu MD;  [facility]    TRANSPLANT LUNG RECIPIENT SINGLE X2 Bilateral 2018    Procedure: TRANSPLANT LUNG RECIPIENT SINGLE X2;  Median Sternotomy, Extracorporeal Membrane Oxygenator, bilateral sequential lung transplant;  Surgeon: Toby Hernandez MD;  [facility]     Social and Family History:     Social History     Socioeconomic History    Marital status:      Spouse name: Not on file    Number of children: Not on file    Years of education: Not on file    Highest education level: Not on file   Occupational History    Not on file   Tobacco Use    Smoking status: Never    Smokeless tobacco: Never   Substance and Sexual Activity    Alcohol use: No     Alcohol/week: 1.0 standard drink of alcohol     Types: 1 Glasses of wine per week    Drug use: No    Sexual activity: Not on file   Other Topics Concern    Parent/sibling w/ CABG, MI or angioplasty before 65F 55M? No   Social History Narrative    3/6/2019 - Lives with . Has three daughters. Four grandchildren (two ). No pets. Travelled previously to [facility]. Has visited [place] several times.      Social Determinants of Health     Financial Resource Strain: Low Risk  (2022)    Received from [facility] Critical access hospital, [facility] Critical access hospital    Overall Financial Resource Strain (CARDIA)     Difficulty of Paying Living Expenses: Not hard at all   Food Insecurity: No Food Insecurity (2022)    Received from [facility] Critical access hospital, [facility] Critical access hospital    Hunger Vital Sign     Worried About Running Out of Food in the Last Year: Never true     Ran Out of Food in the Last Year: Never true   Transportation Needs: No Transportation Needs (2022)    [facility], [facility] Critical access hospital    PRAPARE -  Transportation     Lack of Transportation (Medical): No     Lack of Transportation (Non-Medical): No   Physical Activity: Inactive (9/20/2022)    Received from [facility], [facility]    Exercise Vital Sign     Days of Exercise per Week: 0 days     Minutes of Exercise per Session: 0 min   Stress: No Stress Concern Present (9/20/2022)    Received from [facility]    [place] [place] [employer] - Occupational Stress Questionnaire     Feeling of Stress : Not at all   Social Connections: Moderately Integrated (9/20/2022)    Received from [facility] [employer] [facility]    Social Connection and Isolation Panel [NHANES]     Frequency of Communication with Friends and Family: Twice a week     Frequency of Social Gatherings with Friends and Family: Twice a week     Attends [employer] Services: More than 4 times per year     Active Member of Clubs or Organizations: No     Attends Club or Organization Meetings: Not on file     Marital Status:    Interpersonal Safety: Not At Risk (4/15/2024)    Received from [facility]    Intimate Partner Violence     Are you in a relationship where you are physically hurt, threatened and/or made to feel afraid?: No   Housing Stability: Unknown (9/20/2022)    Received from [facility]    Housing Stability     In the last 12 months, was there a time when you were not able to pay the mortgage or rent on time?: No     Number of Places Lived in the Last Year: Not on file     Number of Places Lived in the Last Year (Outpatient): Not on file     In the last 12 months, was there a time when you did not have a steady place to sleep or slept in a shelter (including now)?: No     Family History   Problem Relation Age of Onset    Hypertension Mother     Arthritis Mother      Pancreatic Cancer Father     Diabetes Father      Allergies and Home Medications:     Allergies   Allergen Reactions    Isopropyl Alcohol Other (See Comments)     The alcohol burns the open areas on her skin when used as a skin prep for procedures    Vancomycin Other (See Comments)     Diffuse erythroderma with itching (improved with Benadryl) after receiving vancomycin over 1 hour. 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 Can use vancomycin in the future, but please give over slower infusion time (at least 2 hours) and premedicate with Benadryl.     Medications Prior to Admission   Medication Sig Dispense Refill Last Dose    acetaminophen (TYLENOL) 325 MG tablet Take 1 tablet (325 mg) by mouth every 4 hours as needed for mild pain or fever 60 tablet      acetylcysteine (MUCOMYST) 10 % nebulizer solution Inhale 4 mLs into the lungs 3 times daily as needed for mucolysis/respiratory distress       albuterol (PROVENTIL) (2.5 MG/3ML) 0.083% neb solution Take 1 vial (2.5 mg) by nebulization every 12 hours       amoxicillin (AMOXIL) 875 MG tablet Take 1 tablet (875 mg) by mouth 2 times daily 60 tablet 1     azithromycin (ZITHROMAX) 250 MG tablet Take 1 tablet (250 mg) by mouth daily       benzonatate (TESSALON) 100 MG capsule Take 1 capsule (100 mg) by mouth every 4 hours as needed for cough (Patient taking differently: Take 100 mg by mouth Monday, Wednesday, Friday.) 30 capsule 0     cholecalciferol 50 MCG (2000 UT) CAPS Take 1 capsule by mouth daily On dialysis days (MWF)       fluticasone-salmeterol (ADVAIR) 250-50 MCG/ACT inhaler Inhale 1 puff into the lungs every 12 hours 60 each 11     Magnesium Cl-Calcium Carbonate (SLOW-MAG) 71.5-119 MG TBEC Take 2 tablets by mouth four times a week Take on non dialysis days T/Th/Sa/Su 90 tablet 3     magnesium oxide 400 MG CAPS Take 400 mg by mouth Tuesday, Thursday, Saturday, Sunday       metoprolol  tartrate (LOPRESSOR) 25 MG tablet Take 1 tablet (25 mg) by mouth 2 times daily       montelukast (SINGULAIR) 10 MG tablet Take 1 tablet (10 mg) by mouth At Bedtime 30 tablet 11     multivitamin RENAL (RENAVITE RX/NEPHROVITE) 1 tablet tablet Take 1 tablet by mouth Tuesday, Thursday, Saturday, Sunday in evening.       pantoprazole (PROTONIX) 40 MG EC tablet Take 1 tablet (40 mg) by mouth every morning (before breakfast) 30 tablet 11     posaconazole (NOXAFIL) 100 MG EC tablet Take 3 tablets (300 mg) by mouth daily (Patient taking differently: Take 300 mg by mouth daily At noon) 90 tablet 11     predniSONE (DELTASONE) 5 MG tablet Take 1 tablet (5 mg) by mouth daily 30 tablet 11     sulfamethoxazole-trimethoprim (BACTRIM) 400-80 MG tablet Take 1 tablet by mouth three times a week (Patient taking differently: Take 1 tablet by mouth three times a week MWF) 13 tablet 11     tacrolimus (GENERIC) 1 mg/mL suspension Take 0.6 mLs (0.6 mg) by mouth every morning AND 0.7 mLs (0.7 mg) every evening. 39 mL 11      Current Scheduled Meds  Current Facility-Administered Medications   Medication Dose Route Frequency Provider Last Rate Last Admin    acetylcysteine (MUCOMYST) 10 % nebulizer solution 4 mL  4 mL Nebulization BID Edin Davis MD   4 mL at 07/18/24 2217    azithromycin (ZITHROMAX) tablet 250 mg  250 mg Per J Tube Daily Edin Davis MD        chlorhexidine (PERIDEX) 0.12 % solution 15 mL  15 mL Mouth/Throat Q12H Edin Davis MD   15 mL at 07/18/24 2052    fiber modular (BANATROL TF) packet 1 packet  1 packet Per Feeding Tube TID Edin Davis MD        heparin ANTICOAGULANT injection 5,000 Units  5,000 Units Subcutaneous Q8H Edin Davis MD   5,000 Units at 07/19/24 0408    insulin aspart (NovoLOG) injection (RAPID ACTING)  1-12 Units Subcutaneous Q4H Edin Davis MD        levalbuterol (XOPENEX) neb solution 0.63 mg  0.63 mg Nebulization 4x Daily Edin Davis MD   0.63  mg at 07/18/24 2217    metoprolol tartrate (LOPRESSOR) tablet 25 mg  25 mg Per J Tube BID Edin Davis MD   25 mg at 07/18/24 2052    montelukast (SINGULAIR) tablet 10 mg  10 mg Per J Tube At Bedtime Edin Davis MD   10 mg at 07/18/24 2100    multivitamin RENAL (RENAVITE RX/NEPHROVITE) tablet 1 tablet  1 tablet Per J Tube Daily Edin Davis MD        pantoprazole (PROTONIX) 2 mg/mL suspension 40 mg  40 mg Per J Tube At Bedtime Edin Davis MD   40 mg at 07/18/24 2100    polyethylene glycol (MIRALAX) Packet 17 g  17 g Oral Daily Edin Davis MD        posaconazole (NOXAFIL) DR tablet TBEC 300 mg  300 mg Per Feeding Tube Kuldip Bosch MD        predniSONE (DELTASONE) tablet 50 mg  50 mg Per J Tube Daily Edin Davis MD        QUEtiapine (SEROquel) tablet 25 mg  25 mg Per J Tube At Bedtime Edin Davis MD   25 mg at 07/18/24 2100    ramelteon (ROZEREM) tablet 8 mg  8 mg Per J Tube At Bedtime Edin Davis MD   8 mg at 07/18/24 2101    sodium chloride (NEBUSAL) 3 % neb solution 3 mL  3 mL Nebulization BID Edin Davis MD   3 mL at 07/18/24 2217    sulfamethoxazole-trimethoprim (BACTRIM) 400-80 MG per tablet 1 tablet  1 tablet Per J Tube Once per day on Monday Wednesday Friday Edin Davis MD        tacrolimus (GENERIC) suspension 1.5 mg  1.5 mg Per G Tube BID IS Edin Davis MD   1.5 mg at 07/18/24 2050    traZODone (DESYREL) half-tab 25 mg  25 mg Per J Tube At Bedtime Edin Davis MD   25 mg at 07/18/24 2101    [START ON 7/20/2024] valGANciclovir (VALCYTE) solution 450 mg  450 mg Per J Tube Once per day on Tuesday Saturday Edin Davis MD        Vitamin D3 (CHOLECALCIFEROL) tablet 50 mcg  50 mcg Per J Tube Once per day on Monday Wednesday Friday Edin Davis MD          Current PRN Meds  Current Facility-Administered Medications   Medication Dose Route Frequency Provider Last Rate Last Admin     acetaminophen (TYLENOL) tablet 650 mg  650 mg Oral Q6H PRN Jailyn Lou MD   650 mg at 07/18/24 2053    glucose gel 15-30 g  15-30 g Oral Q15 Min PRN Edin Davis MD        Or    dextrose 50 % injection 25-50 mL  25-50 mL Intravenous Q15 Min PRN Edin Davis MD        Or    glucagon injection 1 mg  1 mg Subcutaneous Q15 Min PRN Edin Davis MD        senna-docusate (SENOKOT-S/PERICOLACE) 8.6-50 MG per tablet 1 tablet  1 tablet Oral BID PREdin Christy MD        Or    senna-docusate (SENOKOT-S/PERICOLACE) 8.6-50 MG per tablet 2 tablet  2 tablet Oral BID PREdin Christy MD            Physical Exam:     All notes, images, and labs from past 24 hours (at minimum) were reviewed.    Vital signs:  Temp: 98.4  F (36.9  C) Temp src: Oral BP: 116/58 Pulse: 77   Resp: 18 SpO2: 100 % O2 Device: Mechanical Ventilator        I/O:   Intake/Output Summary (Last 24 hours) at 7/19/2024 0744  Last data filed at 7/19/2024 0000  Gross per 24 hour   Intake 300 ml   Output --   Net 300 ml     Constitutional: Sitting up in bed, in no apparent distress.   HEENT: Eyes with pink conjunctivae, anicteric.  Oral mucosa moist without lesions.  Trach cdi.  PULM: Diminished bases bilaterally.  Scattered crackles, no rhonchi, no wheezes.  Non-labored breathing on PS 10/5/30.  CV: Normal S1 and S2.  RRR.  No murmur, gallop, or rub.  No peripheral edema.   ABD: NABS, soft, nontender, nondistended.  PEG/J tube site not visualized.  MSK: Moves all extremities.  + muscle wasting.   NEURO: Alert and conversant by mouthing words.   SKIN: Warm, dry, fragile.  No rash on limited exam.   PSYCH: Mood stable.     Results:     LABS    CMP:   Recent Labs   Lab 07/19/24  0402 07/19/24  0034 07/18/24 2056 07/18/24  1549 07/18/24  0831 07/18/24  0657 07/17/24  0744 07/17/24  0607 07/16/24  0831 07/16/24  0547 07/15/24  0856 07/15/24  0641   NA  --   --   --  137  --  132*  --  133*  --  131*  --  133*  "  POTASSIUM  --   --   --  4.5  --  3.4  --  3.2*  --  3.1*  --  3.1*   CHLORIDE  --   --   --   --   --  92*  --  93*  --  92*  --  95*   CO2  --   --   --   --   --  29  --  30*  --  24  --  25   ANIONGAP  --   --   --   --   --  11  --  10  --  15  --  13   GLC 85 74 133* 164*   < > 128*   < > 150*   < > 166*   < > 157*   BUN  --   --   --   --   --  63.3*  --  39.8*  --  86.1*  --  59.7*   CR  --   --   --   --   --  2.42*  --  1.65*  --  2.84*  --  2.23*   GFRESTIMATED  --   --   --   --   --  22*  --  35*  --  18*  --  24*   CHELSEY  --   --   --   --   --  8.1*  --  8.2*  --  7.9*  --  8.3*   MAG  --   --   --   --   --  1.7  --  1.6*  --  1.9  --  1.9   PHOS  --   --   --   --   --  3.6  --  3.0  --  5.0*  --  4.5    < > = values in this interval not displayed.     CBC:   Recent Labs   Lab 07/19/24  0728 07/18/24  1549 07/18/24  0657 07/17/24  0607 07/16/24  0547   WBC 2.6*  --  2.5* 2.4* 2.7*   RBC 2.32*  --  2.36* 2.34* 2.36*   HGB 7.9* 10.4* 8.1* 8.0* 7.9*   HCT 24.3*  --  25.2* 24.3* 24.6*   *  --  107* 104* 104*   MCH 34.1*  --  34.3* 34.2* 33.5*   MCHC 32.5  --  32.1 32.9 32.1   RDW 24.5*  --  24.7* 24.5* 24.5*     --  152 153 167       INR: No lab results found in last 7 days.    Glucose:   Recent Labs   Lab 07/19/24  0402 07/19/24  0034 07/18/24  2056 07/18/24  1549 07/18/24  1248 07/18/24  1117   GLC 85 74 133* 164* 194* 170*       Blood Gas:   Recent Labs   Lab 07/18/24  1549   PHV 7.26*   PCO2V 72*   PO2V 29   HCO3V 27   LASHAUN 5.5*       Culture Data No results for input(s): \"CULT\" in the last 168 hours.    Virology Data:   Lab Results   Component Value Date    FLUAH1 Not Detected 06/18/2024    FLUAH3 Not Detected 06/18/2024    RE3643 Not Detected 06/18/2024    IFLUB Not Detected 06/18/2024    RSVA Not Detected 06/18/2024    RSVB Not Detected 06/18/2024    PIV1 Not Detected 06/18/2024    PIV2 Not Detected 06/18/2024    PIV3 Not Detected 06/18/2024    HMPV Not Detected 06/18/2024    HRVS " Negative 01/24/2021    ADVBE Negative 01/24/2021    ADVC Negative 01/24/2021    ADVC Negative 12/23/2020    ADVC Negative 10/07/2019       Historical CMV results (last 3 of prior testing):  Lab Results   Component Value Date    CMVQNT <35 (A) 07/16/2024    CMVQNT Not Detected 07/09/2024    CMVQNT <35 (A) 06/18/2024     Lab Results   Component Value Date    CMVLOG <1.5 07/16/2024    CMVLOG 1.6 07/02/2024    CMVLOG 1.6 06/25/2024       Urine Studies    Recent Labs   Lab Test 06/19/24  1214 01/24/21  1729   URINEPH 6.5 5.0   NITRITE Negative Negative   LEUKEST Large* Moderate*   WBCU >182* 34*       Most Recent [place] Pulmonary Function Testing (FVC/FEV1 only)  FVC-Pre   Date Value Ref Range Status   02/14/2024 0.85 L    12/19/2023 1.04 L    11/21/2023 0.85 L    10/24/2023 0.96 L      FVC-%Pred-Pre   Date Value Ref Range Status   02/14/2024 29 %    12/19/2023 36 %    11/21/2023 29 %    10/24/2023 33 %      FEV1-Pre   Date Value Ref Range Status   02/14/2024 0.80 L    12/19/2023 0.89 L    11/21/2023 0.78 L    10/24/2023 0.87 L      FEV1-%Pred-Pre   Date Value Ref Range Status   02/14/2024 34 %    12/19/2023 38 %    11/21/2023 33 %    10/24/2023 37 %        IMAGING    Recent Results (from the past 48 hour(s))   CT Chest w/o Contrast    Narrative    EXAMINATION: Chest CT  7/17/2024 9:37 AM    CLINICAL HISTORY: evaluate for worsening immune/inflammatory pathology  in setting of bilateral lung tranpslant in 2018    COMPARISON: 7/8/2024.    TECHNIQUE: CT imaging obtained through the chest without contrast.  Axial, coronal, and sagittal reconstructions and axial MIP reformatted  images are reviewed.     CONTRAST: None    FINDINGS:  Devices: Tracheostomy tube terminates in the mid thoracic trachea.  Percutaneous gastrojejunostomy tube in place with the balloon well  approximated within the stomach and the anterior abdominal wall in tip  terminating out of the field of view beyond the ligament of Treitz.  Intact medial and  sternotomy wires.    Lungs: Status post bilateral lung transplantation. Azygos fissure. The  airway is patent. Patent right main stem bronchus stent which extends  into the proximal upper lobe and and proximal intermedius. No pleural  effusion or pneumothorax. Overall slightly improved appearance of the  multifocal groundglass confluent opacities throughout the bilateral  lower lobes with more dense consolidation at the bases. More  appreciable improvement in the lingula and right middle lobe with  relative sparing of the apices. Unchanged peripherally calcified  pleural plaque in the left posteromedial chest. Calcified granuloma in  the right lower lobe.    Mediastinum: The thyroid is unremarkable. Cardiac size is normal.  Ascending aorta measures 4.0 cm. Normal three-vessel branching  pattern. No pericardial effusion. No significant coronary artery  calcium. Scattered atherosclerotic calcifications of the thoracic  aorta. No thoracic lymphadenopathy. Calcified right hilar lymph node.  Esophagus is normal in caliber.    Bones and soft tissues: No suspicious bone findings. Chronic  depression type vertebral body deformity at T5. Thickening of the left  breast skin is similar to prior.    Upper Abdomen: Limited evaluation of the upper abdomen. Heavy  calcification of the splenic artery. Splenomegaly. Calcified  gallstones.      Impression    IMPRESSION:   1. Overall improved appearance of the confluent groundglass opacities  primarily affecting the bilateral lower lobes. There is a more  appreciable improvement in the right middle lobe and lingula with  relative sparing of the apices.   2. Status post bilateral lung transplantation with stable support  devices.  3. Cholelithiasis.  4. Ectatic ascending aorta measuring up to 4.0 cm.  5. Atherosclerosis.    I have personally reviewed the examination and initial interpretation  and I agree with the findings.    TERRI ISSA MD         SYSTEM ID:  [id number]   XR  Chest Port 1 View    Narrative    XR CHEST PORT 1 VIEW 7/18/2024 6:30 PM      HISTORY: Evaluate stent placement    COMPARISON: Radiograph 7/11/2024. Chest CT 7/17/2024.     FINDINGS: Frontal view of the chest. Stable tracheostomy tube. Stable  right-sided bronchial stents. Prior sternotomy. Stable heart size.  Midline trachea. Improved aeration throughout the left lung. No  pneumothorax. Right chest wall/pectoral surgical clips.      Impression    IMPRESSION: Stable position of the right-sided bronchial stents.    I have personally reviewed the examination and initial interpretation  and I agree with the findings.    MEHNAZ CHAPMAN MD         SYSTEM ID:  [id number]

## 2024-07-19 NOTE — PLAN OF CARE
Physical Therapy: Orders received. Chart reviewed and discussed with care team.? Physical Therapy not indicated due to patient returning to [facility] on this date.? Defer discharge recommendations to medical team.? Will complete orders.  Please re-consult PT if any new needs arise.

## 2024-07-19 NOTE — PROVIDER NOTIFICATION
07/19/24 0420   Tech Time   $Tech Time (10 minute increments) 2   Ventilator Data   Vent Owner [facility]   Vent Brand Drager   Vent ID [id number]   Ventilation Method Invasive   Ventilator   Ventilator Adult   Ventilator Settings    Vent Mode CMV/AC  (Continuous Mandatory Ventilation/ Assist Control)   Resp Rate (Set) 18 breaths/min   Tidal Volume (Set, mL) 320 mL   FiO2 30 %   PEEP (cm H2O) 5 cmH2O   Inspiratory Time (sec) 0.95 sec   Sensitivity Flow Triggered (L/min) 1 L/min   Vent Humidifier - Heater/HME Heater   Heater Temperature 37   Ventilator - Patient    Patient Resp Rate  18 breaths/min   Expiratory Vt (ml) 299   Minute Volume (ml) 5.27 L/min   Peak Inspiratory Pressure (cm H2O) 29 cmH2O   Mean Airway Pressure (cm H2O) 12 cmH2O   Ventilator Alarms   High Inspiratory Pressure Alarm (cmH2O) 50 cm H2O   High Respiratory Rate (breaths/min) 45 breaths/min   Minute Ventilation Low (L) 3 L/min   Low Respiratory Rate (breaths/min) 8 breaths/min   Minute Ventilation High (L) 20 L/min   Vt High (mL) 1200 mL   External Alarm Functional and On Yes   Weaning Assessment   Pulse 78   Oxygen Therapy   SpO2 100 %   O2 Device Mechanical Ventilator   FiO2 (%) 30 %   Surgical Airway Tracheostomy Shiley 6 Cuffed   Placement Date/Time: 07/08/24 (c) 9884   Surgical Airway Type: Tracheostomy  Brand: Katty  Size: 6  Style: Cuffed  Placed By: Physician   Trach Cap Status Uncapped/Unplugged   Stoma Site Care Stoma site cleansed;Gauze applied;Gauze changed   Skin Assessment Red blanchable   Skin Intervention Foam dressing   Securement Device Foam trach ties   Trach Tie Assessment 2 Finger allowance between skin and ties   Cuff Pressure - Type Minimal occluding volume   Bedside Safety Supplies Manual resuscitation bag;Face mask;PEEP valve;Trach adaptor;Oxygen source;Suction source;10 cc syringe (for cuffed trachs);Sterile water   RT Device Skin Assessment   Oxygen Delivery Device Tracheostomy Ties   Interface Tracheostomy   Ventilator  Arm In Place Yes   Site Appearance neck circumference Clean and dry   Site Appearance tracheostomy stoma and surrounding skin Clean and dry   Site Appearance nares (outer) Clean and dry   Site Appearance nares (inner) Clean and dry   Site Appearance lips Clean and dry   Site Appearance bridge of nose Clean and dry   Site Appearance of ears Clean and dry   Site Appearance occiput Clean and dry   Strap Tightness Finger Allowance between head and device strap   Device Skin Interventions Taken No adjustments needed   Respiratory WDL   Respiratory WDL X   Rhythm/Pattern, Respiratory assisted mechanically   Expansion/Accessory Muscles/Retractions expansion symmetric;no retractions;no use of accessory muscles   Nailbeds pale   Mucous Membranes dry;intact   Cough Frequency infrequent   Cough Type assisted   Ambu at Bedside present   Trachea Assessment Midline   Breath Sounds   Breath Sounds All Fields   All Lung Fields Breath Sounds Anterior:;Lateral:;coarse     RT to follow per protocol.

## 2024-07-19 NOTE — PLAN OF CARE
Problem: Mechanical Ventilation Invasive  Goal: Effective Communication  Outcome: Progressing  Goal: Optimal Device Function  Outcome: Progressing  Intervention: Optimize Device Care and Function  Recent Flowsheet Documentation  Taken 7/19/2024 1530 by Makenzie Mathews, RT  Airway Safety Measures: all equipment/monitors on and audible  Goal: Mechanical Ventilation Liberation  Outcome: Progressing  Goal: Absence of Device-Related Skin and Tissue Injury  Outcome: Progressing  Goal: Absence of Ventilator-Induced Lung Injury  Outcome: Progressing   Goal Outcome Evaluation:    RT PROGRESS NOTE     DATA:     CURRENT SETTINGS:18, 320, 5             TRACH TYPE / SIZE:  #6 Shiley TG changed on 7/8/24             MODE:   CMV             FIO2:   30%     ACTION:             THERAPIES:   Xopenex Qid, mucomyst bid, nebusal bid             SUCTION:                           FREQUENCY:   x1                        AMOUNT:   sm                        CONSISTENCY:   thin                        COLOR:   white             SPONTANEOUS COUGH EFFORT/STRENGTH OF EFFORT (not elicited by suctioning): fair productive                              WEANING PHASE:   1 will start wean tomorrow.                             RESPONSE:             BS:   coarse              VITAL SIGNS:   SPO2 100%, HR 88, RR 33             EMOTIONAL NEEDS / CONCERNS:  Anxious                RISK FOR SELF DECANNULATION:  No                               NOTE / PLAN:     Pt admitted today on above settings, although rr in in the 30's, pt is not fighting the vent.   Will start phase 1 tomorrow.

## 2024-07-19 NOTE — H&P
Transferred to: (place) at 1439  Status at time of transfer: Prior to initiation of transfer, pt given x2 one-time doses of IV ativan, and X1 one time dose of oral seroquel for anxiety. One-time dose of oral zofran given for nausea. MICU 2 team at bedside for pt transfer. Last BP taken (approx 1430) was 147/82. Prior to pt leaving, pt able to take deep breaths and manage anxiety.   Belongings: With patient  Basilio removed? (if no, why?): N/A  Family notified:  with pt

## 2024-07-19 NOTE — PROGRESS NOTES
SPIRITUAL HEALTH SERVICES Initial Assessment Note  [facility] 4C     Summary: Brief supportive visit with patient Kecia Blue and  Ranjan; they are hopeful to return to [facility] today, otherwise low needs, generally benefit from spiritual/emotional support when admitted.     Plan: Chaplains will follow for spiritual support while Kecia is admitted.     Heather Gutierrez M.Div., [facility]  Staff   Pager [phone]     * Spiritual Health Services remains available 24/7 for emergent requests/referrals, either by having the switchboard page the on-call  or by entering an ASAP/STAT consult in Epic (this will also page the on-call ). Routine Epic consults receive an initial response within 24 hours.*

## 2024-07-20 NOTE — PLAN OF CARE
Goal Outcome Evaluation:      Plan of Care Reviewed With: patient, spouse    Overall Patient Progress: no changeOverall Patient Progress: no change    Outcome Evaluation: experiencing anxiety, managing with PRN medications. Elevated blood pressure. Recieved dialysis in afternoon.    Patient is alert and oriented. Had pain of 2/10. Had anxiety throughout day, feeling unable to breathe - oxygen levels remained stable. PRN seroquel given at 0910 and 1631. PRN Ativan given at 1400 and 1912. Patient had nausea in morning. PRN Zofran given at 0915. Patient had a significant change in MCV from 105 to 111 - MD notified. Hemodialysis started at 1630. Patient had elevated blood pressure in of 162/82. Blood pressure medication held due to anticipated dialysis. Temp: 99.5  F (37.5  C) Temp src: Axillary BP: (!) 157/73 Pulse: 117   Resp: 26 SpO2: 99 % O2 Device: Mechanical Ventilator      Ivonne Verde RN

## 2024-07-20 NOTE — PLAN OF CARE
Problem: Mechanical Ventilation Invasive  Goal: Effective Communication  Outcome: Adequate for Care Transition   Goal Outcome Evaluation:       RT PROGRESS NOTE     DATA:     CURRENT SETTINGS: Vent Mode: CMV/AC  (Continuous Mandatory Ventilation/ Assist Control)  FiO2 (%): 30 %  Resp Rate (Set): 18 breaths/min  Tidal Volume (Set, mL): 320 mL  PEEP (cm H2O): 5 cmH2O  Pressure Support (cm H2O): 10 cmH2O  Resp: 26                 TRACH TYPE / SIZE:  #6 Shiley placed in 7/8             MODE:   AC             FIO2:   30     ACTION:             THERAPIES:   Xopen QID/Mucomyst BID and Sodium BID             SUCTION:                           FREQUENCY:   X 5                        AMOUNT:   small to moderate                        CONSISTENCY:   thick                        COLOR:   white             SPONTANEOUS COUGH EFFORT/STRENGTH OF EFFORT (not elicited by suctioning): yes                              WEANING PHASE:   none                        WEAN MODE:                            WEAN TIME:                           END WEAN REASON:        RESPONSE:             BS:   clear and diminished             VITAL SIGNS:   Blood pressure (!) 173/81, pulse 101, temperature 97.8  F (36.6  C), temperature source Oral, resp. rate 26, weight 49 kg (108 lb 0.4 oz), last menstrual period 06/07/2014, SpO2 99%, not currently breastfeeding.               EMOTIONAL NEEDS / CONCERNS:  yes                RISK FOR SELF DECANNULATION:  yes                        RISK DUE TO:  anxiety and agitation                        INTERVENTION/S IN PLACE IS/ARE:  no       NOTE / PLAN:  pt. Is anxious and very agitated. Pt. Requested to take vent off several times and Stuff RT, RN explained to her why she has vent on but pt. Was keep saying remove vent off. Also, pt. Seems not very confuse but sometimes is. Will continue monitoring.   07/19/24 4530  Ventilator weaning phase 1  UNTIL DISCONTINUED        Order Class: Hospital Performed

## 2024-07-20 NOTE — PLAN OF CARE
Goal Outcome Evaluation:       Patient arrived the unit around 1518 via stretcher. Assisted by two EMT and accompanied by her . Alert and oriented. Communicate  Mouth words and writing . Pt on mechanical vent. Anxious .scheduled and PRN Seroquel given with effect  BG was 200 and 187. Pt on continuous TF 30 ml/hr. Pt had two loose stool. Patient denied pain. VS stable.                  Problem: Adult Inpatient Plan of Care  Goal: Absence of Hospital-Acquired Illness or Injury  Intervention: Identify and Manage Fall Risk  Recent Flowsheet Documentation  Taken 7/19/2024 1800 by Khris Love RN  Safety Promotion/Fall Prevention:   activity supervised   lighting adjusted   assistive device/personal items within reach   safety round/check completed   room organization consistent   room near nurse's station   room door open   nonskid shoes/slippers when out of bed   patient and family education   clutter free environment maintained  Intervention: Prevent Skin Injury  Recent Flowsheet Documentation  Taken 7/19/2024 1800 by Khris Love RN  Skin Protection:   incontinence pads utilized   silicone foam dressing in place  Device Skin Pressure Protection: absorbent pad utilized/changed  Intervention: Prevent Infection  Recent Flowsheet Documentation  Taken 7/19/2024 1800 by Khris Love RN  Infection Prevention:   hand hygiene promoted   cohorting utilized   single patient room provided   equipment surfaces disinfected   personal protective equipment utilized   rest/sleep promoted  Taken 7/19/2024 1600 by Khris Love RN  Infection Prevention:   hand hygiene promoted   cohorting utilized   single patient room provided   equipment surfaces disinfected   personal protective equipment utilized   rest/sleep promoted  Goal: Optimal Comfort and Wellbeing  Intervention: Provide Person-Centered Care  Recent Flowsheet Documentation  Taken 7/19/2024 1800 by Khris Love RN  Trust Relationship/Rapport:    care explained   choices provided   emotional support provided   questions encouraged  Goal: Readiness for Transition of Care  Recent Flowsheet Documentation  Taken 7/19/2024 1700 by Khris Love RN  Transportation Anticipated: family or friend will provide  Intervention: Mutually Develop Transition Plan  Recent Flowsheet Documentation  Taken 7/19/2024 1700 by Khris Love RN  Transportation Anticipated: family or friend will provide  Patient/Family Anticipates Transition to: home  Equipment Currently Used at Home: lift device     Problem: Mechanical Ventilation Invasive  Goal: Effective Communication  Intervention: Ensure Effective Communication  Recent Flowsheet Documentation  Taken 7/19/2024 1800 by Khris Love RN  Communication Enhancement Strategies: call light answered in person  Family/Support System Care:   self-care encouraged   support provided   presence promoted  Trust Relationship/Rapport:   care explained   choices provided   emotional support provided   questions encouraged  Goal: Optimal Device Function  Intervention: Optimize Device Care and Function  Recent Flowsheet Documentation  Taken 7/19/2024 1800 by Khris Love RN  Airway Safety Measures:   all equipment/monitors on and audible   manual resuscitator/mask/valve at bedside   suction at bedside  Taken 7/19/2024 1600 by Khris Love RN  Airway Safety Measures:   all equipment/monitors on and audible   manual resuscitator/mask/valve at bedside   suction at bedside  Goal: Mechanical Ventilation Liberation  Intervention: Promote Extubation and Mechanical Ventilation Liberation  Recent Flowsheet Documentation  Taken 7/19/2024 1800 by Khris Love RN  Medication Review/Management: medications reviewed  Environmental Support:   calm environment promoted   personal routine supported   rest periods encouraged  Goal: Absence of Device-Related Skin and Tissue Injury  Intervention: Maintain Skin and Tissue Health  Recent  Flowsheet Documentation  Taken 7/19/2024 1800 by Khris Love, RN  Device Skin Pressure Protection: absorbent pad utilized/changed

## 2024-07-20 NOTE — CONSULTS
CLINICAL NUTRITION SERVICES - ASSESSMENT NOTE     Nutrition Prescription    RECOMMENDATIONS FOR MDs/PROVIDERS TO ORDER:  Recommend adjust insulin regimen with hyperglycemia     Malnutrition Status:    Unable to determine due to need NFPE, RD remote     Recommendations already ordered by Registered Dietitian (RD):  Continue Current TF Regimen- Novasource Renal via J-tube at goal 30 ml/hr x 24 hrs  + 1 pkt Prosource TF20   + 1 pkt Banatrol TF TID   FWF: 30 ml q 4 hrs     Continue Renal Multivitamin    Future/Additional Recommendations:  Monitor TF leland., weight, labs, I/Os.      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 Hospital course complicated by hypotension, delirium, and prolonged respiratory failure.  She has a tracheostomy placement on 7/3 and PEG-J tube placement with IR on 7/5. On 7/19, pt has improved and vitals are now stable and has been transferred back to [place].     NUTRITION HISTORY  Unable to meet with pt at bedside this AM r/t RD remote.   NKFA    7/18-19 Re-admit to [facility] for acute on chronic hypoxic respiratory failure   6/18-7/18 [facility]:   TF hx:   6/19-20 Novasource Renal 30 ml/hr + 1 pkt Prosource TF20   6/20-7/3 Novasource Renal at 30 ml/hr + 2 pkt Prosource TF20   7/2-Current Novasource Renal at 30 ml/hr + 1 pkt Prosource TF20     PEGJ placed 7/05     CURRENT NUTRITION ORDERS  Diet: NPO  Nutrition  Support: EN   Novasource Renal via J-tube at goal 30 ml/hr x 24 hrs   TF Provides: 720 ml/day, 1440 kcal, 65 g protein, 131 g cho, 72 g fat, 0 g fiber, 516 ml free water.   + 1 pkt Prosource TF20 (80 kcal, 20 g protein)  + 1 pkt Banatrol TF TID (135 kcal, 6 g protein, 27 g cho, 15 g fiber)  FWF: 30 ml q 4 hrs   TF + FWF provide: 696 ml free water/day     LABS  Labs reviewed  Na 133(L)   BUN 61.6(H)   Creat 2.49(H)   Mg 2.4(H)   Phos 6.0(H)   Alk Phos 474(H)   ALT 61(H)    this AM     MEDICATIONS  Medications reviewed  Scheduled zithromax, peridex, banatrol TF, novolog, MVI renal, protonix, miralax, noxafil, deltasone, Prosource TF20, bactrim, desyrel, Vit D3    ANTHROPOMETRICS  Height: 0 cm (Data Unavailable)  Most Recent Weight: 49 kg (108 lb 0.4 oz)  bed weight   IBW: 52.3 kg  BMI: Normal BMI 19  Weight History:   >7% wt loss x 1 month, clinically significant wt loss   18.6% wt loss x 11 months   Wt Readings from Last 10 Encounters:   07/20/24 49 kg (108 lb 0.4 oz)   07/18/24 50.7 kg (111 lb 12.4 oz)   05/16/24 54.4 kg (120 lb)   03/13/24 55.3 kg (122 lb)   03/05/24 57.7 kg (127 lb 4.8 oz)   02/15/24 58 kg (127 lb 13.9 oz)   12/18/23 58.1 kg (128 lb)   12/13/23 58.4 kg (128 lb 11.2 oz)   11/29/23 55.7 kg (122 lb 12.7 oz)   11/21/23 58.7 kg (129 lb 6.4 oz)     06/19/24 52.8 kg (116 lb 6.4 oz)     10/17/23 60 kg (132 lb 4.4 oz)   09/07/23 61.1 kg (134 lb 11.2 oz)   08/22/23 60.2 kg (132 lb 11.2 oz)   08/22/23 60.7 kg (133 lb 14.4 oz)   09/27/22 58.1 kg (128 lb)     Dosing Weight: 52.3 kg IBW    ASSESSED NUTRITION NEEDS  Estimated Energy Needs: 1238-3932+ kcals/day (25 - 30+ kcals/kg)  Justification: Increased needs  Estimated Protein Needs: 78+ grams protein/day (1.5+ grams of pro/kg)  Justification: Increased needs  Estimated Fluid Needs: UOP + 500-1000 mL/day   Justification: Per provider pending fluid status and Dialysis    PHYSICAL FINDINGS  See malnutrition section below.  Trache  PEGJ placed  7/05  Rounded abd   BM: x2 7/19   Edema: Trace dependent, L-arm    MALNUTRITION:  % Weight Loss:  > 5% in 1 month (severe malnutrition)  % Intake:  No decreased intake noted  Subcutaneous Fat Loss:  Unable to assess  Muscle Loss:  Unable to assess  Fluid Retention:  Trace dependent, L-arm    Malnutrition Diagnosis: Unable to determine due to need NFPE, RD remote     NUTRITION DIAGNOSIS  Inadequate oral intake related to Respiratory failure as evidenced by NPO and need for EN to meet nutrition needs.       INTERVENTIONS  Implementation  Enteral Nutrition - Continue Current TF Regimen   Continue Renal Multivitamin    Recommend adjust insulin regimen with hyperglycemia     Goals  Pt to meet nutritional needs   Maintain weight >/= 49 kg  Electrolytes WNL   BG < 180   Pt will tolerate TF     Monitoring/Evaluation  Progress toward goals will be monitored and evaluated per protocol.

## 2024-07-20 NOTE — CONSULTS
RENAL CONSULT NOTE    REQUESTING PHYSICIAN: Dr Hardy     REASON FOR CONSULT: ESRD on HD     ASSESSMENT/PLAN:  ESRD: Secondary to CNI toxicity.  Has been on in center hemodialysis since 2019.  Has left upper extremity AVG with good function.  Maintaining TTS schedule while here at [facility], next HD planned today.    Volume/hypertension: TW 50-51 kg.  On monotherapy with Coreg.    Hyponatremia: Mild.  Sodium 133.  UF with HD.    ACD: PTA [facility].  Dosing EPO while inpatient.  Hemoglobin uptrending on low-dose EPO 4000 units 3 times weekly    ESRD MBD: Mild hyperphosphatemia does not appear to be on binders.  Mild hypocalcemia when corrected for hypoalbuminemia.  Check vitamin D level.  Update PTH.  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  Transferring to [facility] from the [facility] for ongoing cares, nephrology consulted for ESRD and HD management    Patients  is present during encounter  Usually dialyzes at [facility]  Transferring from the [facility] 7/19  Has graft in left upper arm and reports good function  Last dialysis treatment was 7/18/2024 at the [facility]  Treatment was uneventful, dialysis nurse note was reviewed  Planning for dialysis today per usual schedule  All questions answered    REVIEW OF SYSTEMS:  Complete 12 point review of systems was negative other than those noted in the HPI      Past Medical History:   Diagnosis Date    Acute on chronic respiratory failure with hypoxia (H) 02/21/2018    Anisocoria     Antisynthetase syndrome (H24) 2014    Anxiety     Aspergillus (H)     Aspergillus pneumonia (H) 11/20/2020    Benign  essential hypertension     C. difficile colitis     Cytomegalovirus (CMV) viremia (H)     Dermatomyositis (H)     Dysplasia of cervix, low grade (ESTRADA 1)     EBV (Franklin-Barr virus) viremia     ESRD (end stage renal disease) on dialysis (H)     ILD (interstitial lung disease) (H)     Lung replaced by transplant (H)     Osteopenia     Paroxysmal atrial fibrillation (H)     Pneumocystis jiroveci pneumonia (H)     PONV (postoperative nausea and vomiting)     Post-menopause     Pulmonary hypertension (H)     Raynaud's disease     Seronegative rheumatoid arthritis (H)        Current Facility-Administered Medications   Medication Dose Route Frequency Provider Last Rate Last Admin    acetaminophen (TYLENOL) tablet 650 mg  650 mg Per Feeding Tube Q6H PRN Dulce Hardy MD        acetylcysteine (MUCOMYST) 20 % nebulizer solution 2 mL  2 mL Nebulization BID Eyad Harding APRN CNP   2 mL at 07/20/24 0738    albumin human 25 % injection 50 mL  50 mL Intravenous Q1H PRN Ioana Ho MD        azithromycin (ZITHROMAX) suspension 125 mg  125 mg Per J Tube Daily Dulce Hardy MD   125 mg at 07/20/24 0909    [START ON 7/21/2024] B and C vitamin Complex with folic acid (NEPHRONEX) liquid 5 mL  5 mL Per Feeding Tube Daily Dulce Hardy MD        carvedilol (COREG) tablet 6.25 mg  6.25 mg Per Feeding Tube BID Dulce Hardy MD        chlorhexidine (PERIDEX) 0.12 % solution 15 mL  15 mL Mouth/Throat Q12H Dulce Hardy MD   15 mL at 07/20/24 0910    dextrose 10% infusion   Intravenous Continuous PRN Dulce Hardy MD        glucose gel 15-30 g  15-30 g Oral Q15 Min PRN Dulce Hardy MD        Or    dextrose 50 % injection 25-50 mL  25-50 mL Intravenous Q15 Min PRN Dulce Hardy MD        Or    glucagon injection 1 mg  1 mg Subcutaneous Q15 Min PRN Dulce Hardy MD        epoetin alisha-epbx (RETACRIT) injection 4,000 Units  4,000 Units Intravenous Once per day on Tuesday  Thursday Saturday Ioana Ho MD        fiber modular (BANATROL TF) packet 1 packet  1 packet Per Feeding Tube TID Dulce Hardy MD   1 packet at 07/20/24 1449    heparin (porcine) injection  1,500 Units Hemodialysis Machine OR IV Push Once in dialysis/CRRT Ioana Ho MD        heparin ANTICOAGULANT injection 5,000 Units  5,000 Units Subcutaneous Q8H Dulce Hardy MD   5,000 Units at 07/20/24 1447    hydrALAZINE (APRESOLINE) injection 10 mg  10 mg Intravenous Q6H PRN Dulce Hardy MD        insulin aspart (NovoLOG) injection (RAPID ACTING)  1-12 Units Subcutaneous Q6H Dulce Hardy MD   4 Units at 07/20/24 1222    levalbuterol (XOPENEX) neb solution 0.63 mg  0.63 mg Nebulization 4x Daily Eyad Harding APRN CNP   0.63 mg at 07/20/24 1128    levalbuterol (XOPENEX) neb solution 0.63 mg  0.63 mg Nebulization Q6H PRN Eyad Harding APRN CNP        lidocaine 1 % 0.5 mL  0.5 mL Intradermal Once PRN Ioana Ho MD        lidocaine 1 % 0.5 mL  0.5 mL Intradermal Once PRN Ioana Ho MD        lidocaine 1 % 0.5 mL  0.5 mL Intradermal Once PRN Dulce Hardy MD        lidocaine 1 % 0.5 mL  0.5 mL Intradermal Once PRN Dulce Hardy MD        LORazepam (ATIVAN) tablet 1 mg  1 mg Per Feeding Tube Q4H PRN Dulce Hardy MD   1 mg at 07/20/24 1400    miconazole (MICATIN) 2 % powder   Topical TID PRN Dulce Hardy MD        montelukast (SINGULAIR) tablet 10 mg  10 mg Per J Tube At Bedtime Dulce Hardy MD   10 mg at 07/19/24 2012    ondansetron (ZOFRAN) injection 4 mg  4 mg Intravenous Q6H PRN Dulce Hardy MD   4 mg at 07/20/24 0915    pantoprazole (PROTONIX) 2 mg/mL suspension 40 mg  40 mg Per J Tube At Bedtime Dulce Hardy MD   40 mg at 07/19/24 2013    Patient is already receiving anticoagulation with heparin, enoxaparin (LOVENOX), warfarin (COUMADIN)  or other anticoagulant medication   Does not apply Continuous  PRN Dulce Hardy MD        polyethylene glycol (MIRALAX) Packet 17 g  17 g Per Feeding Tube Daily Dulce Hardy MD        posaconazole (NOXAFIL) DR tablet TBEC 300 mg  300 mg Per Feeding Tube QAM Dulce Hardy MD   300 mg at 07/20/24 1226    predniSONE (DELTASONE) tablet 50 mg  50 mg Per J Tube Daily Dulce Hardy MD   50 mg at 07/20/24 1010    Prosource TF20 ENfit Compatibl EN LIQD (PROSOURCE TF20) packet 60 mL  1 packet Per Feeding Tube Daily Dulce Hardy MD   60 mL at 07/20/24 0910    QUEtiapine (SEROquel) tablet 25 mg  25 mg Per Feeding Tube Q6H PRN Dulce Hardy MD   25 mg at 07/20/24 0910    QUEtiapine (SEROquel) tablet 25 mg  25 mg Per J Tube At Bedtime Dulce Hardy MD   25 mg at 07/19/24 2012    ramelteon (ROZEREM) tablet 8 mg  8 mg Per J Tube At Bedtime Dulce Hardy MD   8 mg at 07/19/24 2012    senna-docusate (SENOKOT-S/PERICOLACE) 8.6-50 MG per tablet 1 tablet  1 tablet Per Feeding Tube BID PRN Dulce Hardy MD        Or    senna-docusate (SENOKOT-S/PERICOLACE) 8.6-50 MG per tablet 2 tablet  2 tablet Per Feeding Tube BID PRN Dulce Hardy MD        sodium chloride (NEBUSAL) 3 % neb solution 3 mL  3 mL Nebulization 2 times daily Eyad Harding, ADRIÁN CNP   3 mL at 07/20/24 1129    sodium chloride (PF) 0.9% PF flush 3 mL  3 mL Intracatheter Q8H Dulce Hardy MD   3 mL at 07/20/24 1449    sodium chloride 0.9% BOLUS 100-150 mL  100-150 mL Intravenous Q15 Min PRN Ioana Ho MD        sodium chloride 0.9% BOLUS 100-150 mL  100-150 mL Intravenous Q15 Min PRN Dulce Hardy MD        sodium chloride 0.9% BOLUS 250 mL  250 mL Intravenous Once in dialysis/CRRT Ioana Ho MD        sodium chloride 0.9% BOLUS 300 mL  300 mL Hemodialysis Machine Once Ioana Ho MD        Stop Heparin 60 minutes before end of treatment   Does not apply Continuous PRN Ioana Ho MD        sulfamethoxazole-trimethoprim  (BACTRIM/SEPTRA) suspension 80 mg  10 mL Per Feeding Tube Once per day on Tuesday Thursday Saturday Dulce Hardy MD        tacrolimus (GENERIC) suspension 1.5 mg  1.5 mg Per G Tube BID IS Dulce Hardy MD   1.5 mg at 07/20/24 0909    traZODone (DESYREL) half-tab 25 mg  25 mg Per J Tube At Bedtime Dulce Hardy MD   25 mg at 07/19/24 2012    valGANciclovir (VALCYTE) solution 450 mg  450 mg Per J Tube Once per day on Tuesday Saturday Dulce Hardy MD        [START ON 7/22/2024] Vitamin D3 (CHOLECALCIFEROL) tablet 50 mcg  50 mcg Per J Tube Once per day on Monday Wednesday Friday Dulce Hardy MD           No current outpatient medications on file.      ALLERGIES/SENSITIVITIES:  Allergies   Allergen Reactions    Isopropyl Alcohol Other (See Comments)     The alcohol burns the open areas on her skin when used as a skin prep for procedures    Vancomycin Other (See Comments)     Diffuse erythroderma with itching (improved with Benadryl) after receiving vancomycin over 1 hour. 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 Can use vancomycin in the future, but please give over slower infusion time (at least 2 hours) and premedicate with Benadryl.     Social History     Tobacco Use    Smoking status: Never    Smokeless tobacco: Never   Substance Use Topics    Alcohol use: No     Alcohol/week: 1.0 standard drink of alcohol     Types: 1 Glasses of wine per week    Drug use: No     I have reviewed this patient's family history and updated it with pertinent information if needed.  Family History   Problem Relation Age of Onset    Hypertension Mother     Arthritis Mother     Pancreatic Cancer Father     Diabetes Father          PHYSICAL EXAM:  Physical Exam   Temp: 97.6  F (36.4  C) Temp src: Oral BP: (!) 162/82 (Nurse notified right away) Pulse: 92   Resp: 26 SpO2: 100 % O2 Device: Mechanical Ventilator    Vitals:    07/19/24 2233  07/20/24 0424   Weight: 49.1 kg (108 lb 3.9 oz) 49 kg (108 lb 0.4 oz)     Vital Signs with Ranges  Temp:  [97.6  F (36.4  C)-98  F (36.7  C)] 97.6  F (36.4  C)  Pulse:  [] 92  Resp:  [23-35] 26  BP: (118-173)/(62-87) 162/82  FiO2 (%):  [30 %-50 %] 35 %  SpO2:  [88 %-100 %] 100 %  I/O last 3 completed shifts:  In: 546 [I.V.:3; NG/GT:543]  Out: -     Patient Vitals for the past 72 hrs:   Weight   07/20/24 0424 49 kg (108 lb 0.4 oz)   07/19/24 2233 49.1 kg (108 lb 3.9 oz)       General: Alert, NAD  HENT: Midline trach  Eyes: No scleral icterus  Cardiovascular: RRR, no rub, gallop, or murmur.   Extremities: No peripheral edema, well-perfused  Respiratory: Coarse breath sounds  Gastrointestinal: Abdomen nondistended nontender    Laboratory:     Recent Labs   Lab 07/20/24  0650 07/19/24  0728 07/18/24  1549 07/18/24  0657 07/17/24  0607 07/16/24  0547 07/15/24  0641   WBC 5.1 2.6*  --  2.5* 2.4* 2.7* 2.5*   RBC 2.82* 2.32*  --  2.36* 2.34* 2.36* 2.38*   HGB 9.7* 7.9* 10.4* 8.1* 8.0* 7.9* 7.8*   HCT 31.2* 24.3*  --  25.2* 24.3* 24.6* 25.5*    157  --  152 153 167 150       Basic Metabolic Panel:  Recent Labs   Lab 07/20/24  1209 07/20/24  0650 07/20/24  0502 07/19/24  2354 07/19/24  1657 07/19/24  1531 07/19/24  1125 07/19/24  0728 07/18/24  1721 07/18/24  1549 07/18/24  0831 07/18/24  0657 07/17/24  0744 07/17/24  0607 07/16/24  0831 07/16/24  0547 07/15/24  0856 07/15/24  0641   NA  --  133*  --   --   --   --   --  133*  --  137  --  132*  --  133*  --  131*  --  133*   POTASSIUM  --  4.4  --   --   --   --   --  3.8  --  4.5  --  3.4  --  3.2*  --  3.1*  --  3.1*   CHLORIDE  --  93*  --   --   --   --   --  96*  --   --   --  92*  --  93*  --  92*  --  95*   CO2  --  27  --   --   --   --   --  28  --   --   --  29  --  30*  --  24  --  25   BUN  --  61.6*  --   --   --   --   --  35.0*  --   --   --  63.3*  --  39.8*  --  86.1*  --  59.7*   CR  --  2.49*  --   --   --   --   --  1.80*  --   --   --  2.42*   --  1.65*  --  2.84*  --  2.23*   * 200* 224* 290* 187* 200*   < > 90   < > 164*   < > 128*   < > 150*   < > 166*   < > 157*   CHELSEY  --  8.6*  --   --   --   --   --  8.6*  --   --   --  8.1*  --  8.2*  --  7.9*  --  8.3*    < > = values in this interval not displayed.       INRNo lab results found in last 7 days.    Recent Labs   Lab Test 07/20/24  0650 07/19/24  0728   POTASSIUM 4.4 3.8   CHLORIDE 93* 96*   BUN 61.6* 35.0*      Recent Labs   Lab Test 07/20/24  0650 07/11/24  0518 06/20/24  0420 06/19/24  1214 01/26/21  0425 01/24/21  1729   ALBUMIN 3.5 2.6*   < >  --    < >  --    BILITOTAL 0.6 0.4   < >  --    < >  --    ALT 61* 13   < >  --    < >  --    AST 41 30   < >  --    < >  --    PROTEIN  --   --   --  600*  --  30*    < > = values in this interval not displayed.       Personally reviewed today's laboratory studies    This note was dictated using voice recognition       Thank you for involving us in the care of this patient. We will continue to follow along with you.      Chlao Aldridge PA-C  [facility]  [phone]

## 2024-07-20 NOTE — PROGRESS NOTES
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 Hospital course complicated by hypotension, delirium, and prolonged respiratory failure.  She has a tracheostomy placement on 7/3 and PEG-J tube placement with IR on 7/5.      Patient arrived on LTACH unit on 7/18 in severe respiratory distress. She complained of nausea, chest pressure, and dsypnea. She is tachypneic and breathing at 41 breaths per minute. She is tripoding and pulse ox reading in low 80's, FiO2 on vent was increased to 50%. She has severe rhonchi and rales in all lung fields, worst in RUL. She was suctioned twice and minimal secretions noted and was given xopenex. Her rhochi and rales of L lung field improved, but not the RUL. She is having severe anxiety and was given 0.5 mg of ativan IVP. She is complaining of chest pressure and tachycardic to 133. EKG done and revealed an incomplete right bundle branch block, no ST-T wave changes in contiguous leads. BP is 215/91- given 10mg of IV hydralazine, BP decreased to 185/99. Spoke with Dr. Sanches and he accepted pt back to [facility].  On 7/19, pt has improved and vitals are now stable and has been transferred back to [place].    LTACH course:  7/20:  still anxious, didn't sleep well overnight, only was able to do PS for 55 minutes, will get dialysis today.      Assessment & Plan      #Acute on chronic hypoxic hypercapnic respiratory failure, improving  #Concern for possible immune vs  inflammatory process, improving  #HAP, Stenotrophomonas maltophilia, Enterococcus faecalis, and Candida guilliermondi complex  #Severe pulmonary edema  #Acute respiratory distress syndrome, resolved  Presented to the ED on 6/18/2024 with acute on chronic hypoxic hypercapnic respiratory failure. Transferred to MICU and intubated on overnight on 6/18. Workup significant for Stenotrophomonas, Enterococcus, and Candida pneumonia. Course was c/b progression to ARDS (6/21-22). CXR (6/26) with similar pulmonary opacities compared to 6/24, left > right.  The etiology of this groundglass opacity was unclear but ddx includes rejection vs. infection vs. volume overload. Tracheostomy placed on 7/3 due to inability to wean from ventilator. CT chest w/o contrast 7/8 demonstrated waxing and waning mostly improved right lung consolidation now with diffuse groundglass organizing opacities mostly in the lower lobe an middle lobe more than the upper lobe. Slight improvement in the peribronchiolar consolidation in the left lower lobe but there is increased groundglass organization in the left upper lobe. CT on 7/17 demonstrated improvement in bibasilar predominant GGO.  - Pulmonary toilet  - Mucomyst BID  - Hypertonic saline BID, alternate with mucomyst  - Albuterol neb QID  - Antibiotics as below  - Volume management with iHD  - Continue Daily SBT (AM and PM) on pressure support 10/5  - Pulm Transplant following while inpatient              - Plan for transplant pulmonology follow-up in 6-8 weeks     Vent Mode: CMV/AC  (Continuous Mandatory Ventilation/ Assist Control)  FiO2 (%): 30 %  Resp Rate (Set): 18 breaths/min  Tidal Volume (Set, mL): 320 mL  PEEP (cm H2O): 5 cmH2O  Pressure Support (cm H2O): 10 cmH2O  Resp: 25     #Recurrent right middle bronchus stenosis s/p dilation and stent (6/20/2024), stable  Has history (bronchoscopy 2/15/24) demonstrating narrowing of right mainstem transplant anastomosis and bronchus intermedius. CT  chest (6/18/2024) and bronchoscopy (6/19/2024) demonstrated occlusion of right middle bronchus. With concern for post-obstructive pneumonia, interventional pulmonology was consulted, and completed bronchoscopy (6/20/2024) with tissue debulking, right middle bronchus balloon dilation to 11 mm, stent placement in right main bronchus, and bifurcating cast placement in right upper bronchus. IP pulm re-evaluated bronchial stents with BAL and noted that they were open.    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   - Prospera (7/3) 2.26  - DSA (7/3) negative  - PTA nebs  - Fluticasone-viilanterol (breo ellipta) every day - HOLD with respiratory infection  - Montelukast every day   - Immunosuppressants per Tx Pulm:   - PTA Tacrolimus 1.5mg BID  - PTA Prednisone 5 mg daily   - PTA azathioprine - HOLD per Transplant pulmonology with repeat infections  - Peripheral smear (7/9) pending for pancytopenia  - Antibiotic prophylaxis               - Bactrim MWF for PJP ppx (monitor need to adjust pending HD plan)  - CMV weekly monitoring (qTuesday)  - Azithromycin 125mg per pulmonary, continue to monitor QTc  - Continue VGCV ppx (renally dosed, monitor need to adjust pending HD plan) with tentative plan for 3 weeks beyond completion of stress dose steroids   - Steroid Taper per Transplant Pulmonology as below:  Date Daily dose (mg)   7/11 50   7/25 45   8/8 40   8/22 35   9/5 30   9/19 25   10/17 20   11/14 15   12/12 10   1/9 5      - Hold PTA prednisone dose while completing steroid taper as above     #HAP, Stenotrophomonas maltophilia, Enterococcus faecalis, Aspergillus, and Candida guilliermondi complex  Presented to the ED on 6/18/2024 with SOB and  hypercapnic respiratory failure. Found on bronchoscopy (6/19) to have RML obstruction by narrowing bronchus intermedius as well as copious mucopurulent secretions/mucus plugging. Previously treated for Stenotrophonomas/aspergillus pneumonia in 11/2023 with subsequent sputum culture (2/2024) negative for both Stenotrophonomas/Aspergillus. Etiology likely repeat Stenotrophomonas infection vs opportunistic infection from colonized microbe.   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                - Continue posaconazole 300 mg/day                - Complete 14 days of micafungin 150 mg/day today (6/25-7/9)- discontinued   - Continue VGCV prophylaxis  - Weekly CMV VL (Tuesdays, next 7/9), last was 39 (7/2)         - Azithromycin per pulmonary (holding due to QTC, but will follow up)  - TMP/SMX SS daily for PJP PPx (for life given h/o PJP)  - Awaiting susceptibilities on Aspergillus per transplant ID  - Present antibiotics               - Continue posaconazole 300 mg/day per pulm transplant (6/25 to present) for candida guillermondii and asergillus ochraceus on prior BAL  - Past antibiotics              - s/p micafungin 150 mg/day (6/25-7/9)  - s/p IV minocycline 100mg BD x 14 days total (6/20-7/5)- for stenotrophamonas  - PO linezolid 600 mg BID x 10 days (6/25-7/5)- for VRE in urine and BAL cultures  - S/p ceftazidime (6/25-6/28)               - S/p vancomycin (6/18-6/20)              - S/p zosyn (6/18-21)  - S/p Daptomycin  (6/21-6/23)  - S/p Meropenem (6/21-6/24)  - S/p Levofloxacin (6/21-6/23)     - BAL fluid (7/3): pink, hazy, cell counts normal range, fluid sent for viral, bacterial and fungal cultures negative to date, cytology negative for malignancy and organisms; preliminary AFB negative     #Hx Aspergillus PNA (11/2023)  #Hx PJP PNA (1/2021)  #Hx CMV viremia, recurrent  #Hx EBV viremia  - Transplant ID consult, appreciate recs  PTA posaconazole (Treatment for hx of aspergillus PNA)  PTA azithromycin 250mg qd (CLAD ppx) (holding)  PTA bactrim (PJP ppx)  -CMV PCR 7/16 positive but <35     #ESRD on iHD (T,Th,Sat)  Hypokalemia  History of ESRD 2/2 Tacrolimus toxicity on HD (MWF). With soft blood pressures, placed RIJ (6/20/2024) and started CRRT (6/20/2024). US of AVF 7/5: arterial inflow patent without inflow stenosis, normal diameter of anastamosis, venous outflow elevated velocity at subclavian- suggestive of recurrent stenosis in venous outflow (area of prior angioplasty in March). IR consulted who noted that IR fistulogram not required at that time, and was able to run iHD on beginning on 7/6 without issue.   - Nephrology consulted and following for ESRD, patient tolerating 3x weekly HD at time of discharge  - Renally-dosed medications  - BMP daily  - HD T/ Th/ Sa schedule now  -replete electrolytes as needed      #Anxiety  Insomnia   Patient has been having anxiety regarding pressure support trials and weaning from the ventilator. Health Psychology consulted and followed patient weekly while hospitalized.   -seroqeul 25 at bedtime   -ramelteon 8 mg at bedtime  -trazadone 25 mg at bedtime   -ativan q4h  prn   -seroquel 25 mg q6h prn      #Toxic metabolic encephalopathy, resolved  Patient noted to have lethargy in setting of sepsis and delirium in the setting of high-dose steroid therapy which improved with management of underlying sepsis. Patient mental status returned to baseline at day of discharge.   - Quetiapine to 25 mg  at bedtime   - Ramalteon at bedtime  - Trazodone 25mg for sleep  - Delirium precautions  - Do not disturb order overnight to promote sleep     #Septic shock, resolved  On 6/19/2024, developed sepsis in the setting of stenotrophomonas pneumonia/enterococcus faecium VRE UTI. Etiology of shock likely distributive iso sepsis; less likely hypovolemic (not pulling volumes with CRRT), medication-associated (weaned off of propofol gtt) or obstructive (low suspicion for PE, echo 6/19 without evidence of cardiac dysfunction). Occasional episodes of hypotension overnight, but self-resolving and have not occurred in a couple days. Related to HAP, resolved with treatment of infection as below.   - s/p SDS & pressors and midodrine, off of all support agents  - MAPs 70s-80s, stable     #Demand Ischemia, resolved    Presented with elevated troponin (peak 499). Not associated with chest pain or hypoxia. EKG without ST elevations/depressions or T wave inversions. Suspect demand ischemia in the setting of sepsis. Will continue to monitor symptoms and continuous telemetry. EKG 7/2 sinus rhythm with PACs.     #Paroxysmal A-Fib, improved  #Pulmonary Hypertension   #Transient arhythmia with palpitations  History of pulmonary hypertension (45 mmHg, echo 10/2023) in the setting of ILD and paroxysmal afib on PTA metoprolol tartrate BID. Not on anticoagulation for afib. Transient unspecified arhythmia overnight (7/9) with palpitations. EKG 7/9 sinus rhythm with fusion complexes and known RBBB. Repeat EKG 7/10 showing sinus rhythm with no RBBB or fusion complexes. Patient given magnesium 1g and 20mEq K+ for K 3.3 this am. No further episodes of palpitations. Currently in sinus rhythm.  - continue PTA metoprolol tartrate 25mg BID  - CTM     #Prolonged Qtc, resolved  Repeat EKG 7/13 with QTC of 483.   - Azithromycin ppx restarted per pulm transplant  -Qtc 476 (7/17)  -Daily EKG        Severe protein calorie deficiency  PEG-J placed by IR on 7/5,  continue tube feeds.   - Nutrition consulted  - Tube feeds 30ml/hr; at goal     #Diarrhea, resolved  On 6/21, had 8 bowel movements. Occurred in the setting of scheduled bowel regimen and starting new antibiotics (meropenem, levofloxacin). C diff negative.   - Holding PRN bowel regimen for loose stools  - Miralax PRN   - Senna prn  - formula changes made per nutrition      #Cholelithiasis with gallbladder wall thickening, resolved  During admission patient found to have up trending LFTs and fever in setting of GB wall thickening on CT abdomen/pelvis, now resolved. MRCP completed showing borderline dilated CBD up to 1.1 cm, no significant intrahepatic biliary dilation, no evidence of choledocholithiasis, no evidence of acute cholecystitis.           #Risk for hyperglycemia with high dose steroids  Stress dose steroids discontinued, resume PTA prednisone 5 mg daily. Blood sugars have remained in appropriate range.  - NPH 8U BID  - CTM        #Pyuria, Enterococcus faecium VRE and  ESBL E. Coli   Asymptomatic. With progressive IV pressor needs, obtained broad infectious workup which demonstrated UCx (6/19/2024) with Enterococcus faecium VR and ESBL E. Coli. S/p Daptomycin (6/21-6/23) and Meropenem (6/21-6/24).        #Acute on Chronic Normocytic Anemia, stable  #Thrombocytopenia, chronic, improving  History of chronic anemia in the setting of kidney disease (baseline Hgb 10-11). Upon admission, Hgb 9. May be bone marrow suppression in the setting of chronic illness; potential contribution by blood loss with CRRT filter changes (~150-200c). Peripheral smear (6/18/2024) without evidence of schistocytes.                   Diet: Adult Formula Drip Feeding: Continuous Novasource Renal; Jejunostomy; Goal Rate: 30; mL/hr; OK to begin at goal rate. Please abide by 8-hour hang-time for food safety. Pour 237 mL (1 carton) of formula into TF pump bag per 8-hour batch.; Do not ad...    DVT Prophylaxis: Heparin SQ  Basilio Catheter:  Not present  Lines: None     Cardiac Monitoring: None  Code Status: Full Code      Clinically Significant Risk Factors Present on Admission            # Hypomagnesemia: Lowest Mg = 1.6 mg/dL in last 2 days, will replace as needed         # Acute Respiratory Failure: based on blood gas results.  Continue supplemental oxygen as needed   # Anemia: based on hgb <11        #Precipitous drop in Hgb/Hct: Lowest Hgb this hospitalization: 7.9 g/dL. Will continue to monitor and treat/transfuse as appropriate.         # Financial/Environmental Concerns:           Disposition Plan     Medically Ready for Discharge: Anticipated in 5+ Days             Dulce Hardy MD  Hospitalist Service  LTACH  Securely message with [employer] (more info)  Text page via [employer] Paging/Directory   ______________________________________________________________________    Interval History   She has not slept much overnight  She has dyspnea  She has no chest pain   +anxiety  Intermittent nausea     Physical Exam   Vital Signs: Temp: 97.6  F (36.4  C) Temp src: Oral BP: (!) 162/82 (Nurse notified right away) Pulse: 92   Resp: 26 SpO2: 100 % O2 Device: Mechanical Ventilator    Weight: 108 lbs .41 oz    General:  No acute distress, cachectic , temporal muscle wasting  Eyes:  Sclera non icteric, normal conjuctiva  Neck :  Supple, no lymphadenopathy, trach in place   Lungs: Rhonchi noted in all lung fields   CVS:  S1 and S2, regular rate and rhythem  Abdomen:  Soft, nontender, PEG in place   Musculoskeletal:  No pain or swelling.  No edema  Neuro:  Alert and oriented.  No acute deficit     Medical Decision Making       55 MINUTES SPENT BY ME on the date of service doing chart review, history, exam, documentation & further activities per the note.      Data   Imaging results reviewed over the past 24 hrs:   No results found for this or any previous visit (from the past 24 hour(s)).  Most Recent 3 CBC's:  Recent Labs   Lab Test 07/20/24  0650  07/19/24  0728 07/18/24  1549 07/18/24  0657   WBC 5.1 2.6*  --  2.5*   HGB 9.7* 7.9* 10.4* 8.1*   * 105*  --  107*    157  --  152     Most Recent 3 BMP's:  Recent Labs   Lab Test 07/20/24  1209 07/20/24  0650 07/20/24  0502 07/19/24  1125 07/19/24  0728 07/18/24  1721 07/18/24  1549 07/18/24  0831 07/18/24  0657   NA  --  133*  --   --  133*  --  137  --  132*   POTASSIUM  --  4.4  --   --  3.8  --  4.5  --  3.4   CHLORIDE  --  93*  --   --  96*  --   --   --  92*   CO2  --  27  --   --  28  --   --   --  29   BUN  --  61.6*  --   --  35.0*  --   --   --  63.3*   CR  --  2.49*  --   --  1.80*  --   --   --  2.42*   ANIONGAP  --  13  --   --  9  --   --   --  11   CHELSEY  --  8.6*  --   --  8.6*  --   --   --  8.1*   * 200* 224*   < > 90   < > 164*   < > 128*    < > = values in this interval not displayed.     Most Recent 2 LFT's:  Recent Labs   Lab Test 07/20/24  0650 07/11/24  0518   AST 41 30   ALT 61* 13   ALKPHOS 474* 282*   BILITOTAL 0.6 0.4     Most Recent 3 INR's:  Recent Labs   Lab Test 06/18/24  1418 03/05/24  0925 02/14/24  1050   INR 0.98 0.98 0.96

## 2024-07-20 NOTE — PROGRESS NOTES
Consult request received.  I reviewed patient's chart.  The plan is to dialyze today as per usual TTS schedule. Last HD was on 07/18.  Dialysis orders entered in patient's chart.  I notified on-call dialysis RN [place].    Full consult will follow later today.     Ioana Ho MD  [facility]  [address] [place] [address], [address]  [place] [address]  Phone# [phone]  Fax# [phone]

## 2024-07-20 NOTE — PROGRESS NOTES
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 The patient was admitted on 6/18/24 for progressive hypoxia with dyspnea and worsening hypercapnia in ED requiring intubation likely d/t post-obstructive PNA 2/2 right bronchus stenosis. S/p IP bronch (6/20) with laser tissue debulking RMB stenosis, airway balloon dilation of RMB and BI, and placement of stent RMB to BI and bifurcating stent in RUL. S/p CRRT (6/20-7/2), iHD resumed 7/4. Recurrence of paroxysmal afib with RVR first noted 6/25. S/p trach with ENT on 7/3 and GJ tube with IR on 7/5. Treating empirically for possible immune/inflammatory process vs organizing pneumonia (given GG changes on CT) with steroid burst/taper. Gradual improvement in PST on ventilator. She was discharged to [facility] 7/18/024 in the afternoon but was readmitted to MICU the same evening for suspected acute on chronic hypoxic respiratory failure, but likely exacerbated by severe anxiety episode. Transferred back to [facility] 7/19. \"   General Observations Alert, cooperative. Anxious.  present.   Type of Evaluation   Type of Evaluation Artificial Airway (Speaking Valve);Swallow Evaluation   Tracheostomy Assessment " (Speaking Valve)   Cuff, Tracheostomy Tube cuffed, inflated   Date of Tracheostomy 07/03/24   Tube Size, Tracheostomy 6   Comment, Tracheostomy (Speaking Valve) Patient had previous trach in 2021   Participation Ability (Speaking Valve) awake/alert;attempts to communicate, mouthing words   Type, Tracheostomy Tube Shiley   Oral/Tracheal Secretions (Speaking Valve)   Oral Secretions (Speaking Valve Assessment) minimal secretions   Tracheal Secretions (Speaking Valve Assessment) minimal secretions   Speaking Valve Trials (Speaking Valve)   Voice Production (Speaking Valve Trial) fair strength/quality   Breath Support (Speaking Valve Trial) exhales through mouth   Set-up/Cuff Deflation (Speaking Valve Trial) ventilator settings meet trial criteria;cuff deflated, total;tolerance with no vital sign changes;tolerance, adequate airflow;tidal volume/pressure loss after cuff deflation   Recommendations (Speaking Valve Trials) continue speaking valve trials with SLP present   Cough Production (Speaking Valve Trial) fair   Secretions/Suction, Cuff Deflation (Speaking Valve) tracheal suctioning post cuff deflation;oral suctioning post cuff deflation;tracheal suctioning prior to trial   Secretions During Valve Use (Speaking Valve Trial) secretions stable during valve use   Airflow/Phonation Air flow around trach adequate with finger occlusion;Able to phonate with occlusion of trach   Speaking Valve placed on tracheostomy tube;placed with inline adapter   Cuff Inflated at Onset of Evaluation Yes   Orders received to deflate cuff for PMSV trial Yes   Oxygen saturation before cuff deflation   (97)   Oxygen saturation after cuff deflation 95 %   Oxygen saturation with PMSV placement 95 %   Total amount of time with PMSV placement: 17   Respiratory rate before cuff deflation 27 Per Minute   Respiratory rate after cuff deflation 15 Per Minute   Oral Motor   Oral Musculature generally intact   Structural Abnormalities present   Mucosal  Quality adequate   Dentition (Oral Motor)   Dentition (Oral Motor) natural dentition   Facial Symmetry (Oral Motor)   Facial Symmetry (Oral Motor) WNL   Lip Function (Oral Motor)   Lip Range of Motion (Oral Motor) WNL   Tongue Function (Oral Motor)   Tongue Strength (Oral Motor) strength decreased   Tongue ROM (Oral Motor) WNL   Vocal Quality/Secretion Management (Oral Motor)   Vocal Quality (Oral Motor) aphonia;dysphonic;hypophonic   Secretion Management (Oral Motor) WNL   Comment, Vocal Quality/Secretion Management (Oral Motor) Blue Dye Test: The reason for the current test was to evaluate swallow/secretion management. The patient had minimal oral secretions and the patient had minimal  tracheal secretions. The dye was given while on vent. A spontaneous swallow was elicited. Hyolaryngeal movement appeared reduced. There was no blue dye noted around trach site. Upon initial tracheal suctioning with cuff down there was no blue dye observed, confirmed by RT present. Hand off to RT, Makenzie, was completed in room. Follow-up suctioning for delayed aspiration to be completed by RT. Please see RT notes for details.   General Swallowing Observations   Past History of Dysphagia Cleared for regular textures and thin liquids with previous trach in 2021   Current Diet/Method of Nutritional Intake (General Swallowing Observations, NIS) NPO   Swallowing Recommendations   Diet Consistency Recommendations NPO   Instrumental Assessment Recommendations VFSS (videofluoroscopic swallowing study)  (once tolerating speaking valve on trach mask consistently)   General Therapy Interventions   Planned Therapy Interventions Dysphagia Treatment;Communication   Dysphagia treatment Instruction of safe swallow strategies;Compensatory strategies for swallowing   Communication Speaking valve instruction;Improve speech intelligibility   Clinical Impression   Criteria for Skilled Therapeutic Interventions Met (SLP Eval) Yes, treatment indicated    SLP Diagnosis Dysphonia, dysphagia   Risks & Benefits of therapy have been explained evaluation/treatment results reviewed;care plan/treatment goals reviewed;risks/benefits reviewed;participants voiced agreement with care plan;participants included;patient;spouse/significant other   Clinical Impression Comments Patient presents with no immediate aspiration of secretions per blue dye test. Demonstrates good potential for speaking valve use, with speech therapy only while still on vent, but with potential to liberalize to longer periods of time once on trach mask. Seems to improve anxiety with airflow through upper airway afforded by the speaking valve and cuff deflation.   SLP Total Evaluation Time   Eval: oral/pharyngeal swallow function, clinical swallow Minutes (41542) 10   Eval: use and/or fitting of voice prosthetic device to supp speech (not aug. comm) Minutes (83762) 25   SLP Goals   Therapy Frequency (SLP Eval) 5 times/week   SLP Predicted Duration/Target Date for Goal Attainment 09/06/24   SLP Goals Swallow;Speaking Valve   SLP: Safely tolerate diet without signs/symptoms of aspiration One P.O. texture   SLP: Tolerate valve placement without change in vital signs 30 minutes or longer   SLP: Demonstrate clear voicing at 3 foot distance from listener with 90% intelligibility;for sentence-length speech  (off vent)   SLP Discharge Planning   SLP Plan in-line speaking valve trials with speech only; swallow evaluation once tolerating on trach mask consistently   SLP Discharge Recommendation [facility] patient will benefit from intensive, interdisciplinary therapy.  Anticipate will be able to tolerate 3 hours of therapy per day   SLP Rationale for DC Rec impaired voicing and swallowing   SLP Brief overview of current status  NPO, speaking valve with speech therapy only

## 2024-07-20 NOTE — PLAN OF CARE
Problem: Adult Inpatient Plan of Care  Goal: Plan of Care Review  Description: The Plan of Care Review/Shift note should be completed every shift.  The Outcome Evaluation is a brief statement about your assessment that the patient is improving, declining, or no change.  This information will be displayed automatically on your shift  note.  7/20/2024 0613 by Yoana Ling RN  Outcome: Progressing  Flowsheets (Taken 7/20/2024 0613)  Plan of Care Reviewed With: patient  7/20/2024 0612 by Yoana Ling RN  Outcome: Progressing  Goal: Absence of Hospital-Acquired Illness or Injury  Intervention: Identify and Manage Fall Risk  Recent Flowsheet Documentation  Taken 7/20/2024 0004 by Yoana Ling RN  Safety Promotion/Fall Prevention:   room near nurse's station   room door open  Intervention: Prevent Skin Injury  Recent Flowsheet Documentation  Taken 7/20/2024 0004 by Yoana Ling RN  Body Position: turned  Device Skin Pressure Protection: absorbent pad utilized/changed  Goal: Optimal Comfort and Wellbeing  Intervention: Provide Person-Centered Care  Recent Flowsheet Documentation  Taken 7/20/2024 0004 by Yoana Ling RN  Trust Relationship/Rapport: care explained     Problem: Mechanical Ventilation Invasive  Goal: Effective Communication  Intervention: Ensure Effective Communication  Recent Flowsheet Documentation  Taken 7/20/2024 0004 by Yoana Ling RN  Communication Enhancement Strategies:   call light answered in person   nonverbal strategies used  Trust Relationship/Rapport: care explained  Goal: Optimal Device Function  7/20/2024 0613 by Yoana Ling RN  Outcome: Progressing  7/20/2024 0612 by Yoana Ling RN  Outcome: Progressing  Intervention: Optimize Device Care and Function  Recent Flowsheet Documentation  Taken 7/20/2024 0004 by Yoana Ling RN  Airway Safety Measures: all equipment/monitors on and audible  Goal: Mechanical Ventilation Liberation  Intervention: Promote Extubation and  Mechanical Ventilation Liberation  Recent Flowsheet Documentation  Taken 7/20/2024 0004 by Yoana Ling RN  Sleep/Rest Enhancement:   awakenings minimized   comfort measures   room darkened  Medication Review/Management: medications reviewed  Environmental Support: calm environment promoted  Goal: Absence of Device-Related Skin and Tissue Injury  Intervention: Maintain Skin and Tissue Health  Recent Flowsheet Documentation  Taken 7/20/2024 0004 by Yoana Ling RN  Device Skin Pressure Protection: absorbent pad utilized/changed  Goal: Absence of Ventilator-Induced Lung Injury  Intervention: Prevent Ventilator-Associated Pneumonia  Recent Flowsheet Documentation  Taken 7/20/2024 0004 by Yoana Ling RN  Head of Bed (HOB) Positioning: HOB at 30-45 degrees     Problem: Anxiety Signs/Symptoms  Goal: Optimized Energy Level (Anxiety Signs/Symptoms)  Outcome: Not Progressing   Goal Outcome Evaluation:      Plan of Care Reviewed With: patient        Patient was alert and oriented. Pt was discomfort and anxious whole night, she didn't sleep. PRN medication was given; not effective. Pt stated she want the ventilator removed, and it was uncomfortable with the vent on. She also said vent was making a lot of noise and it is keeping her awake. BP was elevated, no prn given due to PRN hydralazine's parameter is SBP = 180.     BP (!) 173/81 (BP Location: Right arm)   Pulse 100   Temp 97.8  F (36.6  C) (Oral)   Resp (!) 33   Wt 49 kg (108 lb 0.4 oz)   LMP 06/07/2014 (Exact Date)   SpO2 (S) (!) 88%   BMI 19.14 kg/m

## 2024-07-20 NOTE — PLAN OF CARE
Problem: Mechanical Ventilation Invasive  Goal: Optimal Device Function  7/20/2024 0748 by Makenzie Mathews, RT  Outcome: Progressing  7/20/2024 0748 by Makenzie Mathews, RT  Outcome: Progressing  Intervention: Optimize Device Care and Function  Recent Flowsheet Documentation  Taken 7/20/2024 0739 by Makenzie Mathews, RT  Airway Safety Measures: all equipment/monitors on and audible  Goal: Mechanical Ventilation Liberation  7/20/2024 0748 by Makenzie Mathews, RT  Outcome: Progressing  7/20/2024 0748 by Makenzie Mathews, RT  Outcome: Progressing  Goal: Absence of Device-Related Skin and Tissue Injury  7/20/2024 0748 by Makenzie Mathews, RT  Outcome: Progressing  7/20/2024 0748 by Makenzie Mathews, RT  Outcome: Progressing  Goal: Absence of Ventilator-Induced Lung Injury  7/20/2024 0748 by Makenzie Mathews, RT  Outcome: Progressing  7/20/2024 0748 by Makenzie Mathews, RT  Outcome: Progressing   Goal Outcome Evaluation:    RT PROGRESS NOTE     DATA:     CURRENT SETTINGS:18, 320, 5             TRACH TYPE / SIZE:  #6 Shiley TG changed on 7/8/24             MODE:   CMV             FIO2:   40%     ACTION:             THERAPIES:   Xopenex x3, mucomyst x2, nebusal x1             SUCTION:                           FREQUENCY:   x3                        AMOUNT:   sm                        CONSISTENCY:   thin                        COLOR:   white josue             SPONTANEOUS COUGH EFFORT/STRENGTH OF EFFORT (not elicited by suctioning): fair productive  BDT: no immediate                              WEANING PHASE: 1  WEAN MODE: Ps 12/5  WEAN TIME: 50 min   END WEAN REASON: per pt request    PMV-IN-LINE: 17 min                             RESPONSE:             BS:   coarse              VITAL SIGNS:   SPO2 100%, HR 88, RR 33             EMOTIONAL NEEDS / CONCERNS:  Anxious                RISK FOR SELF DECANNULATION:  No                               NOTE / PLAN:     Prior to  dialysis noted increased of PIP 35-40, sx for sm thin white josue, it slowly improved to mid 20's  Pt to cont with current poc.

## 2024-07-21 NOTE — PLAN OF CARE
Problem: Mechanical Ventilation Invasive  Goal: Optimal Device Function  Outcome: Progressing  Goal: Mechanical Ventilation Liberation  Outcome: Progressing  Goal: Absence of Device-Related Skin and Tissue Injury  Outcome: Progressing  Goal: Absence of Ventilator-Induced Lung Injury  Outcome: Progressing   Goal Outcome Evaluation:             RT PROGRESS NOTE     DATA:     CURRENT SETTINGS:             TRACH TYPE / SIZE:  #6 shiley placed 7/8             MODE:   ac 18, 320, peep 5, 30%              ACTION:             THERAPIES:   xopenex qid, mucomyst bid, saline bid             SUCTION:                           FREQUENCY:   x5                        AMOUNT:   scant to moderate                        CONSISTENCY:    thick thin                        COLOR:   white yellow             SPONTANEOUS COUGH EFFORT/STRENGTH OF EFFORT (not elicited by suctioning): good                              WEANING PHASE:   1                        WEAN MODE:    ps 12/5, 30%                        WEAN TIME:   1 hour and  40 minutes                        END WEAN REASON:   RR increased and pt request     RESPONSE:             BS:   rhonchi and crackles on right>left             VITAL SIGNS:   Blood pressure (!) 158/76, pulse 97, temperature 98.1  F (36.7  C), temperature source Axillary, resp. rate (!) 40, weight 49.2 kg (108 lb 7.5 oz), last menstrual period 06/07/2014, SpO2 98%, not currently breastfeeding.               EMOTIONAL NEEDS / CONCERNS:  anxiety                 RISK FOR SELF DECANNULATION:  no                                                NOTE / PLAN:   Continue to encourage and support.

## 2024-07-21 NOTE — PROGRESS NOTES
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 Hospital course complicated by hypotension, delirium, and prolonged respiratory failure.  She has a tracheostomy placement on 7/3 and PEG-J tube placement with IR on 7/5.      Patient arrived on LTACH unit on 7/18 in severe respiratory distress. She complained of nausea, chest pressure, and dsypnea. She is tachypneic and breathing at 41 breaths per minute. She is tripoding and pulse ox reading in low 80's, FiO2 on vent was increased to 50%. She has severe rhonchi and rales in all lung fields, worst in RUL. She was suctioned twice and minimal secretions noted and was given xopenex. Her rhochi and rales of L lung field improved, but not the RUL. She is having severe anxiety and was given 0.5 mg of ativan IVP. She is complaining of chest pressure and tachycardic to 133. EKG done and revealed an incomplete right bundle branch block, no ST-T wave changes in contiguous leads. BP is 215/91- given 10mg of IV hydralazine, BP decreased to 185/99. Spoke with Dr. Sanches and he accepted pt back to [facility].  On 7/19, pt has improved and vitals are now stable and has been transferred back to [place].    LTACH course:  7/20:  still anxious, didn't sleep well overnight, only was able to do PS for 55 minutes, will get dialysis today.    7/21:  +anxiety- given seroquel and ativan, +nausea- +zofran     Assessment & Plan      #Acute on chronic hypoxic hypercapnic  respiratory failure, improving  #Concern for possible immune vs inflammatory process, improving  #HAP, Stenotrophomonas maltophilia, Enterococcus faecalis, and Candida guilliermondi complex  #Severe pulmonary edema  #Acute respiratory distress syndrome, resolved  Presented to the ED on 6/18/2024 with acute on chronic hypoxic hypercapnic respiratory failure. Transferred to MICU and intubated on overnight on 6/18. Workup significant for Stenotrophomonas, Enterococcus, and Candida pneumonia. Course was c/b progression to ARDS (6/21-22). CXR (6/26) with similar pulmonary opacities compared to 6/24, left > right.  The etiology of this groundglass opacity was unclear but ddx includes rejection vs. infection vs. volume overload. Tracheostomy placed on 7/3 due to inability to wean from ventilator. CT chest w/o contrast 7/8 demonstrated waxing and waning mostly improved right lung consolidation now with diffuse groundglass organizing opacities mostly in the lower lobe an middle lobe more than the upper lobe. Slight improvement in the peribronchiolar consolidation in the left lower lobe but there is increased groundglass organization in the left upper lobe. CT on 7/17 demonstrated improvement in bibasilar predominant GGO.  - Pulmonary toilet  - Mucomyst BID  - Hypertonic saline BID, alternate with mucomyst  - Albuterol neb QID  - Antibiotics as below  - Volume management with iHD  - Continue Daily SBT (AM and PM) on pressure support 10/5  - Pulm Transplant following while inpatient              - Plan for transplant pulmonology follow-up in 6-8 weeks     Vent Mode: CMV/AC  (Continuous Mandatory Ventilation/ Assist Control)  FiO2 (%): 30 %  Resp Rate (Set): 18 breaths/min  Tidal Volume (Set, mL): 320 mL  PEEP (cm H2O): 5 cmH2O  Pressure Support (cm H2O): 10 cmH2O  Resp: 25     #Recurrent right middle bronchus stenosis s/p dilation and stent (6/20/2024), stable  Has history (bronchoscopy 2/15/24) demonstrating narrowing of  right mainstem transplant anastomosis and bronchus intermedius. CT chest (6/18/2024) and bronchoscopy (6/19/2024) demonstrated occlusion of right middle bronchus. With concern for post-obstructive pneumonia, interventional pulmonology was consulted, and completed bronchoscopy (6/20/2024) with tissue debulking, right middle bronchus balloon dilation to 11 mm, stent placement in right main bronchus, and bifurcating cast placement in right upper bronchus. IP pulm re-evaluated bronchial stents with BAL and noted that they were open.    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   - Prospera (7/3) 2.26  - DSA (7/3) negative  - PTA nebs  - Fluticasone-viilanterol (breo ellipta) every day - HOLD with respiratory infection  - Montelukast every day   - Immunosuppressants per Tx Pulm:   - PTA Tacrolimus 1.5mg BID  - PTA Prednisone 5 mg daily   - PTA azathioprine - HOLD per Transplant pulmonology with repeat infections  - Peripheral smear (7/9) pending for pancytopenia  - Antibiotic prophylaxis               - Bactrim MWF for PJP ppx (monitor need to adjust pending HD plan)  - CMV weekly monitoring (qTuesday)  - Azithromycin 125mg per pulmonary, continue to monitor QTc  - Continue VGCV ppx (renally dosed, monitor need to adjust pending HD plan) with tentative plan for 3 weeks beyond completion of stress dose steroids   - Steroid Taper per Transplant Pulmonology as below:  Date Daily dose (mg)   7/11 50   7/25 45   8/8 40   8/22 35   9/5 30   9/19 25   10/17 20   11/14 15   12/12 10   1/9 5      - Hold PTA prednisone dose while completing steroid taper as above     #HAP, Stenotrophomonas maltophilia, Enterococcus faecalis, Aspergillus, and Candida  guilliermondi complex  Presented to the ED on 6/18/2024 with SOB and hypercapnic respiratory failure. Found on bronchoscopy (6/19) to have RML obstruction by narrowing bronchus intermedius as well as copious mucopurulent secretions/mucus plugging. Previously treated for Stenotrophonomas/aspergillus pneumonia in 11/2023 with subsequent sputum culture (2/2024) negative for both Stenotrophonomas/Aspergillus. Etiology likely repeat Stenotrophomonas infection vs opportunistic infection from colonized microbe.   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                - Continue posaconazole 300 mg/day                - Complete 14 days of micafungin 150 mg/day today (6/25-7/9)- discontinued   - Continue VGCV prophylaxis  - Weekly CMV VL (Tuesdays, next 7/9), last was 39 (7/2)         - Azithromycin per pulmonary (holding due to QTC, but will follow up)  - TMP/SMX SS daily for PJP PPx (for life given h/o PJP)  - Awaiting susceptibilities on Aspergillus per transplant ID  - Present antibiotics               - Continue posaconazole 300 mg/day per pulm transplant (6/25 to present) for candida guillermondii and asergillus ochraceus on prior BAL  - Past antibiotics              - s/p micafungin 150 mg/day (6/25-7/9)  - s/p IV minocycline 100mg BD x 14 days total (6/20-7/5)- for stenotrophamonas  - PO linezolid 600 mg BID x 10 days (6/25-7/5)- for VRE in urine and BAL cultures  - S/p ceftazidime (6/25-6/28)               - S/p vancomycin  (6/18-6/20)              - S/p zosyn (6/18-21)  - S/p Daptomycin (6/21-6/23)  - S/p Meropenem (6/21-6/24)  - S/p Levofloxacin (6/21-6/23)     - BAL fluid (7/3): pink, hazy, cell counts normal range, fluid sent for viral, bacterial and fungal cultures negative to date, cytology negative for malignancy and organisms; preliminary AFB negative     #Hx Aspergillus PNA (11/2023)  #Hx PJP PNA (1/2021)  #Hx CMV viremia, recurrent  #Hx EBV viremia  - Transplant ID consult, appreciate recs  PTA posaconazole (Treatment for hx of aspergillus PNA)  PTA azithromycin 250mg qd (CLAD ppx) (holding)  PTA bactrim (PJP ppx)  -CMV PCR 7/16 positive but <35     #ESRD on iHD (T,Th,Sat)  Hypokalemia  History of ESRD 2/2 Tacrolimus toxicity on HD (MWF). With soft blood pressures, placed RIJ (6/20/2024) and started CRRT (6/20/2024). US of AVF 7/5: arterial inflow patent without inflow stenosis, normal diameter of anastamosis, venous outflow elevated velocity at subclavian- suggestive of recurrent stenosis in venous outflow (area of prior angioplasty in March). IR consulted who noted that IR fistulogram not required at that time, and was able to run iHD on beginning on 7/6 without issue.   - Nephrology consulted and following for ESRD, patient tolerating 3x weekly HD at time of discharge  - Renally-dosed medications  - BMP daily  - HD T/ Th/ Sa schedule now  -replete electrolytes as needed      #Anxiety  Insomnia   Patient has been having anxiety regarding pressure support trials and weaning from the ventilator. Health Psychology consulted and followed patient weekly while hospitalized.   -seroqeul 25 at bedtime   -ramelteon 8 mg at bedtime  -trazadone 25 mg at bedtime   -ativan q4h  prn   -seroquel 25 mg q6h prn      #Toxic metabolic encephalopathy, resolved  Patient noted to have lethargy in setting of sepsis and delirium in the setting of high-dose steroid therapy which improved with management of underlying sepsis. Patient mental status  returned to baseline at day of discharge.   - Quetiapine to 25 mg at bedtime   - Ramalteon at bedtime  - Trazodone 25mg for sleep  - Delirium precautions  - Do not disturb order overnight to promote sleep     #Septic shock, resolved  On 6/19/2024, developed sepsis in the setting of stenotrophomonas pneumonia/enterococcus faecium VRE UTI. Etiology of shock likely distributive iso sepsis; less likely hypovolemic (not pulling volumes with CRRT), medication-associated (weaned off of propofol gtt) or obstructive (low suspicion for PE, echo 6/19 without evidence of cardiac dysfunction). Occasional episodes of hypotension overnight, but self-resolving and have not occurred in a couple days. Related to HAP, resolved with treatment of infection as below.   - s/p SDS & pressors and midodrine, off of all support agents  - MAPs 70s-80s, stable     #Demand Ischemia, resolved    Presented with elevated troponin (peak 499). Not associated with chest pain or hypoxia. EKG without ST elevations/depressions or T wave inversions. Suspect demand ischemia in the setting of sepsis. Will continue to monitor symptoms and continuous telemetry. EKG 7/2 sinus rhythm with PACs.     #Paroxysmal A-Fib, improved  #Pulmonary Hypertension   #Transient arhythmia with palpitations  History of pulmonary hypertension (45 mmHg, echo 10/2023) in the setting of ILD and paroxysmal afib on PTA metoprolol tartrate BID. Not on anticoagulation for afib. Transient unspecified arhythmia overnight (7/9) with palpitations. EKG 7/9 sinus rhythm with fusion complexes and known RBBB. Repeat EKG 7/10 showing sinus rhythm with no RBBB or fusion complexes. Patient given magnesium 1g and 20mEq K+ for K 3.3 this am. No further episodes of palpitations. Currently in sinus rhythm.  - continue PTA metoprolol tartrate 25mg BID  - CTM     #Prolonged Qtc, resolved  Repeat EKG 7/13 with QTC of 483.   - Azithromycin ppx restarted per pulm transplant  -Qtc 476 (7/17)  -Daily EKG         Severe protein calorie deficiency  PEG-J placed by IR on 7/5, continue tube feeds.   - Nutrition consulted  - Tube feeds 30ml/hr; at goal     #Diarrhea, resolved  On 6/21, had 8 bowel movements. Occurred in the setting of scheduled bowel regimen and starting new antibiotics (meropenem, levofloxacin). C diff negative.   - Holding PRN bowel regimen for loose stools  - Miralax PRN   - Senna prn  - formula changes made per nutrition      #Cholelithiasis with gallbladder wall thickening, resolved  During admission patient found to have up trending LFTs and fever in setting of GB wall thickening on CT abdomen/pelvis, now resolved. MRCP completed showing borderline dilated CBD up to 1.1 cm, no significant intrahepatic biliary dilation, no evidence of choledocholithiasis, no evidence of acute cholecystitis.           #Risk for hyperglycemia with high dose steroids  Stress dose steroids discontinued, resume PTA prednisone 5 mg daily. Blood sugars have remained in appropriate range.  - NPH 8U BID  - CTM        #Pyuria, Enterococcus faecium VRE and  ESBL E. Coli   Asymptomatic. With progressive IV pressor needs, obtained broad infectious workup which demonstrated UCx (6/19/2024) with Enterococcus faecium VR and ESBL E. Coli. S/p Daptomycin (6/21-6/23) and Meropenem (6/21-6/24).        #Acute on Chronic Normocytic Anemia, stable  #Thrombocytopenia, chronic, improving  History of chronic anemia in the setting of kidney disease (baseline Hgb 10-11). Upon admission, Hgb 9. May be bone marrow suppression in the setting of chronic illness; potential contribution by blood loss with CRRT filter changes (~150-200c). Peripheral smear (6/18/2024) without evidence of schistocytes.                   Diet: Adult Formula Drip Feeding: Continuous Novasource Renal; Jejunostomy; Goal Rate: 30; mL/hr; OK to begin at goal rate. Please abide by 8-hour hang-time for food safety. Pour 237 mL (1 carton) of formula into TF pump bag per 8-hour  batch.; Do not ad...    DVT Prophylaxis: Heparin SQ  Basilio Catheter: Not present  Lines: None     Cardiac Monitoring: None  Code Status: Full Code      Clinically Significant Risk Factors                              #Precipitous drop in Hgb/Hct: Lowest Hgb this hospitalization: 7.9 g/dL. Will continue to monitor and treat/transfuse as appropriate.         # Financial/Environmental Concerns:           Disposition Plan     Medically Ready for Discharge: Anticipated in 5+ Days             Dulce Hardy MD  Hospitalist Service  LTACH  Securely message with [employer] (more info)  Text page via [employer] Paging/Directory   ______________________________________________________________________    Interval History   She has not slept much overnight  She has dyspnea  She has no chest pain   +anxiety  Intermittent nausea     Physical Exam   Vital Signs: Temp: 99.5  F (37.5  C) Temp src: Axillary BP: 107/59 Pulse: 95   Resp: 30 SpO2: 98 % O2 Device: Mechanical Ventilator    Weight: 108 lbs 7.46 oz    General:  No acute distress, cachectic , temporal muscle wasting  Eyes:  Sclera non icteric, normal conjuctiva  Neck :  Supple, no lymphadenopathy, trach in place   Lungs: Rhonchi noted in all lung fields   CVS:  S1 and S2, regular rate and rhythem  Abdomen:  Soft, nontender, PEG in place   Musculoskeletal:  No pain or swelling.  No edema  Neuro:  Alert and oriented.  No acute deficit     Medical Decision Making       52 MINUTES SPENT BY ME on the date of service doing chart review, history, exam, documentation & further activities per the note.      Data   Imaging results reviewed over the past 24 hrs:   No results found for this or any previous visit (from the past 24 hour(s)).  Most Recent 3 CBC's:  Recent Labs   Lab Test 07/21/24  0626 07/20/24  0650 07/19/24  0728 07/18/24  1549 07/18/24  0657   WBC  --  5.1 2.6*  --  2.5*   HGB 8.8* 9.7* 7.9*   < > 8.1*   MCV  --  111* 105*  --  107*   PLT  --  216 157  --  152    < > =  values in this interval not displayed.     Most Recent 3 BMP's:  Recent Labs   Lab Test 07/21/24  0626 07/21/24  0544 07/20/24  2338 07/20/24  1209 07/20/24  0650 07/19/24  1125 07/19/24  0728 07/18/24  0831 07/18/24  0657     --   --   --  133*  --  133*   < > 132*   POTASSIUM 3.6  --   --   --  4.4  --  3.8   < > 3.4   CHLORIDE  --   --   --   --  93*  --  96*  --  92*   CO2  --   --   --   --  27  --  28  --  29   BUN  --   --   --   --  61.6*  --  35.0*  --  63.3*   CR  --   --   --   --  2.49*  --  1.80*  --  2.42*   ANIONGAP  --   --   --   --  13  --  9  --  11   CHELSEY  --   --   --   --  8.6*  --  8.6*  --  8.1*   * 154* 172*   < > 200*   < > 90   < > 128*    < > = values in this interval not displayed.     Most Recent 2 LFT's:  Recent Labs   Lab Test 07/20/24  0650 07/11/24  0518   AST 41 30   ALT 61* 13   ALKPHOS 474* 282*   BILITOTAL 0.6 0.4     Most Recent 3 INR's:  Recent Labs   Lab Test 06/18/24  1418 03/05/24  0925 02/14/24  1050   INR 0.98 0.98 0.96

## 2024-07-21 NOTE — PLAN OF CARE
Problem: Adult Inpatient Plan of Care  Goal: Plan of Care Review  Description: The Plan of Care Review/Shift note should be completed every shift.  The Outcome Evaluation is a brief statement about your assessment that the patient is improving, declining, or no change.  This information will be displayed automatically on your shift  note.  Outcome: Progressing  Flowsheets (Taken 7/21/2024 0427)  Plan of Care Reviewed With: patient  Goal: Absence of Hospital-Acquired Illness or Injury  Intervention: Identify and Manage Fall Risk  Recent Flowsheet Documentation  Taken 7/21/2024 0003 by Yoana Ling RN  Safety Promotion/Fall Prevention:   room near nurse's station   room door open  Intervention: Prevent Skin Injury  Recent Flowsheet Documentation  Taken 7/21/2024 0003 by Yoana Ling RN  Body Position: turned  Device Skin Pressure Protection: absorbent pad utilized/changed  Goal: Optimal Comfort and Wellbeing  Intervention: Provide Person-Centered Care  Recent Flowsheet Documentation  Taken 7/21/2024 0003 by Yoana Ling RN  Trust Relationship/Rapport: care explained       Problem: Anxiety Signs/Symptoms  Goal: Optimized Energy Level (Anxiety Signs/Symptoms)  Outcome: Progressing  Goal: Improved Mood Symptoms (Anxiety Signs/Symptoms)  Intervention: Optimize Emotion and Mood  Recent Flowsheet Documentation  Taken 7/21/2024 0003 by Yoana Ling RN  Supportive Measures: active listening utilized  Goal: Enhanced Social, Occupational or Functional Skills (Anxiety Signs/Symptoms)  Intervention: Promote Social, Occupational and Functional Ability  Recent Flowsheet Documentation  Taken 7/21/2024 0003 by Yoana Ling RN  Trust Relationship/Rapport: care explained         Goal Outcome Evaluation:      Plan of Care Reviewed With: patient  Patient was alert and oriented. She denies pain or discomfort. Pt was cooperative. Pt was not experiencing any anxiety, quiet environment  promoted. No PRN given. She slept  through the shift.      /67 (BP Location: Right arm)   Pulse 85   Temp 99.4  F (37.4  C) (Oral)   Resp (!) 34   Wt 49 kg (108 lb 0.4 oz)   LMP 06/07/2014 (Exact Date)   SpO2 100%   BMI 19.14 kg/m

## 2024-07-21 NOTE — PLAN OF CARE
Problem: Mechanical Ventilation Invasive  Goal: Effective Communication  Outcome: Adequate for Care Transition   Goal Outcome Evaluation:       RT PROGRESS NOTE     DATA:     CURRENT SETTINGS: Vent Mode: CMV/AC  (Continuous Mandatory Ventilation/ Assist Control)  FiO2 (%): 30 %  Resp Rate (Set): 18 breaths/min  Tidal Volume (Set, mL): 320 mL  PEEP (cm H2O): 5 cmH2O  Pressure Support (cm H2O): 12 cmH2O  Resp: 30                 TRACH TYPE / SIZE:  #6 Shiley placed in 7/8             MODE:   AC             FIO2:   30     ACTION:             THERAPIES:   Xopen QID/Mucomyst BID and Sodium BID             SUCTION:                           FREQUENCY:   X 3                        AMOUNT:   small to moderate                        CONSISTENCY:   thick                        COLOR:   white and pale yellow             SPONTANEOUS COUGH EFFORT/STRENGTH OF EFFORT (not elicited by suctioning): yes                              WEANING PHASE:   none                        WEAN MODE:                            WEAN TIME:                           END WEAN REASON:        RESPONSE:             BS:   clear and diminished             VITAL SIGNS:   Blood pressure 135/67, pulse 95, temperature 99.4  F (37.4  C), temperature source Oral, resp. rate 30, weight 49.2 kg (108 lb 7.5 oz), last menstrual period 06/07/2014, SpO2 100%, not currently breastfeeding.               EMOTIONAL NEEDS / CONCERNS:  yes                RISK FOR SELF DECANNULATION:  yes                        RISK DUE TO:  anxiety and agitation                        INTERVENTION/S IN PLACE IS/ARE:  no       NOTE / PLAN:  pt. Was more calm last night and slept well.    07/19/24 2147  Ventilator weaning phase 1  UNTIL DISCONTINUED        Order Class: Hospital Performed

## 2024-07-21 NOTE — PROGRESS NOTES
HEMODIALYSIS TREATMENT NOTE    Date: 7/20/2024  Time: 9:15 PM    Data:     Weight change:  0 kg  Ultrafiltration - Post Run Net Total Removed (mL): 0 mL  Vascular Access Site:AVG patent   Dialyzer Rinse: Light  Total Blood Volume Processed: 89.3  Liters  Total Dialysis (Treatment) Time: 3.5 Hours  Dialysate Bath: K 3, Ca 2.25  Heparin: Heparin: None    Lab:   No    Interventions:  Dialysis done through: AV Fistula, Accessed with 15 ga needles, 400 BFR achieved and maintained  UF set to 0.3 Liters of fluid removal, accommodating priming and rinse back volumes  BFR: Blood Flow Rate (BFR): 350-450 mL/min  DFR: Potassium/Calcium Rate: 600 mL/min  Epogen administered per MAR  Pt is below EDW, goal set at 0 ml  Pt was anxious, given meds, pt calmer afterwards  Crit Line stable throughout the run, tolerated HD tx  Rinsed back successfully, needles pulled, site held for 15 min, pressure dressing applied and secured with tape once homeostasis achieved, dressing to be removed in 4-6 hours.   see flowsheets for additional information.   Post HD report given to PCN, left pt in room in stable condition.    Assessment:  A/O x 4, anxious/ cooperative, denies pain  Access site intact, previous dressing clean and dry     Plan:    Per Renal team

## 2024-07-22 NOTE — PROGRESS NOTES
Pulmonary Progress Note    Admit Date: 7/19/2024  CODE: Full Code    Assessment/Plan:     Problems:  Acute on chronic hypoxic and hypercapnic respiratory failure suspect d/t post obstructive multi-lobar pneumonia 2/2 right bronchus stenosis s/p stent RMB to BI & bifurcating stent in RUL. Completed antibiotics. S/p trach on prolonged mechanical ventilation   6 Shiley tracheostomy placed 7/3/24: Hx trach in 2021 s/p decann  Oropharyngeal dysphagia status post PEG tube on tube feeds   Possible  on steroid taper   S/p BSLT for ILD 2018 c/b CLAD, possible ACR  Immunosuppression: Tacro goal level 8-10. 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  After discussion with  her anxiety isn't really substantially changed over the past month.  It waxed and waned and [facility].     Plan/Recommendations:  Phase 2 vent wean pathway: trach dome with speaking valve 1hr max BID.  Otherwise may attempt PS during the day and continue AC at night   CxR tomorrow for routine follow up   Nebs: levalbuterol QID, Mucomyst BID alternating with HTS for stent   Prednisone taper ordered: currently on 50mg daily, decrease to 45mg on 7/25(ordered)  Vt to 360mL given VBG today and patient air hungry at times.  Repeat VBG in am   PTA Singulair   Azithromycin 125 mg daily (resumed 7/18 at lower dose, prior held 7/10 for prolonged QTc), repeat EKG tomorrow   Tacro levels qMTh - pharmacy and transplant to adjust dose accordingly. Level today pending   transplant ID follow up ~6-8 weeks   CMV weekly monitoring  Check posaconazole trough tomorrow  EBV monitoring, ordered for Monday(pending)  BDT and in-line PMV trials with Speech therapy - passed BDT  PT/OT  WBC low  1.3. Last ANC ok 1.6 on 7/19 - check CBC w/ diff tomorrow   Consult palliative per 's request to re address GOC: he was hoping for prognosis on vent liberation & discharge but explained it's too early to determine as I've only assessed the patient once since admission.   Primary adjusting anxiolytics. Pending ECG tomorrow may consider long active selective serotonin reuptake inhibitor for ELOISA.       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 The patient was admitted on 6/18/24 for progressive hypoxia with dyspnea and worsening hypercapnia in ED requiring intubation likely d/t post-obstructive PNA 2/2 right bronchus stenosis. S/p IP bronch (6/20) with laser tissue debulking RMB stenosis, airway balloon dilation of RMB and BI, and placement of stent RMB to BI and bifurcating stent in RUL. S/p CRRT (6/20-7/2), iHD resumed 7/4. Recurrence of paroxysmal afib with RVR first noted 6/25. S/p trach with ENT on 7/3 and GJ tube with IR on 7/5. Treating empirically for possible immune/inflammatory process vs organizing pneumonia (given GG changes on CT) with steroid burst/taper. Gradual improvement in PST on ventilator. She was discharged to LTACH 7/18/024 in the afternoon but was readmitted to MICU the same evening for suspected acute on chronic hypoxic respiratory failure, but likely exacerbated by severe anxiety episode. Transferred back to LTAC 7/19.     Subjective: resting in bed on AC.  Did trach dome with PMV for about 30min then requested vent d/t anxiety per RT.  Spoke with  Ranjan who's distraught by her lack of  progress over the past 5 weeks.  Fearful that the patient might not want this level of care if no meaningful improvement. He's requesting palliative consult which I'm happy to do.     Ventilator weaning results:  7/19: no wean, new admit   7/20: Passed BDT, wore in-line PMV 17min. PS 12 for 50 min(stopped per pt request)  7/21: PS 12 for 1hr 40min(tachypnea, pt request to stop)      Tracheal secretions: suctioned 5x in past 24hr for scant to moderate amounts, thick     Clinical status discussed today with respiratory therapist, hospitalist, patient,  Ranjan     ROS: 10-system review obtained and negative with the exception of the symptoms noted above.    Medications:     Current Facility-Administered Medications   Medication Dose Route Frequency Provider Last Rate Last Admin    dextrose 10% infusion   Intravenous Continuous PRN Dulce Hardy MD        Patient is already receiving anticoagulation with heparin, enoxaparin (LOVENOX), warfarin (COUMADIN)  or other anticoagulant medication   Does not apply Continuous PRN Dulce Hardy MD        Stop Heparin 60 minutes before end of treatment   Does not apply Continuous PRN Ioana Ho MD         Current Facility-Administered Medications   Medication Dose Route Frequency Provider Last Rate Last Admin    acetylcysteine (MUCOMYST) 20 % nebulizer solution 2 mL  2 mL Nebulization BID Eyad Harding APRN CNP   2 mL at 07/22/24 0747    azithromycin (ZITHROMAX) suspension 125 mg  125 mg Per J Tube Daily Dulce Hardy MD   125 mg at 07/22/24 0829    B and C vitamin Complex with folic acid (NEPHRONEX) liquid 5 mL  5 mL Per Feeding Tube Daily Dulce Hardy MD   5 mL at 07/22/24 0829    carvedilol (COREG) tablet 6.25 mg  6.25 mg Per Feeding Tube BID Dulce Hardy MD   6.25 mg at 07/21/24 2057    chlorhexidine (PERIDEX) 0.12 % solution 15 mL  15 mL Mouth/Throat Q12H Dulce Hardy MD   15 mL at 07/22/24 0829    epoetin alisha-epbx  (RETACRIT) injection 4,000 Units  4,000 Units Intravenous Once per day on Tuesday Thursday Saturday Ioana Ho MD   4,000 Units at 07/20/24 1728    fiber modular (BANATROL TF) packet 1 packet  1 packet Per Feeding Tube TID Dulce Hardy MD   1 packet at 07/21/24 2058    heparin ANTICOAGULANT injection 5,000 Units  5,000 Units Subcutaneous Q8H Dulce Hardy MD   5,000 Units at 07/22/24 0617    insulin aspart (NovoLOG) injection (RAPID ACTING)  1-12 Units Subcutaneous Q6H Dulce Hardy MD   2 Units at 07/22/24 0633    levalbuterol (XOPENEX) neb solution 0.63 mg  0.63 mg Nebulization 4x Daily Eyad Harding APRN CNP   0.63 mg at 07/22/24 0747    LORazepam (ATIVAN) injection 1 mg  1 mg Intravenous Once Dulce Hardy MD        montelukast (SINGULAIR) tablet 10 mg  10 mg Per J Tube At Bedtime Dulce Hardy MD   10 mg at 07/21/24 2057    pantoprazole (PROTONIX) 2 mg/mL suspension 40 mg  40 mg Per J Tube At Bedtime Dulce Hardy MD   40 mg at 07/21/24 2056    polyethylene glycol (MIRALAX) Packet 17 g  17 g Per Feeding Tube Daily Dulce Hardy MD        posaconazole (NOXAFIL) DR tablet TBEC 300 mg  300 mg Per Feeding Tube QAM Dulce Hadry MD   300 mg at 07/21/24 1259    predniSONE (DELTASONE) tablet 50 mg  50 mg Per J Tube Daily Dulce Hardy MD   50 mg at 07/22/24 0829    Prosource TF20 ENfit Compatibl EN LIQD (PROSOURCE TF20) packet 60 mL  1 packet Per Feeding Tube Daily Dulce Hardy MD   60 mL at 07/22/24 0829    QUEtiapine (SEROquel) tablet 25 mg  25 mg Per J Tube At Bedtime Dulce Hardy MD   25 mg at 07/21/24 2056    ramelteon (ROZEREM) tablet 8 mg  8 mg Per J Tube At Bedtime Dulce Hardy MD   8 mg at 07/21/24 2056    sodium chloride (NEBUSAL) 3 % neb solution 3 mL  3 mL Nebulization 2 times daily Eyad Harding APRN CNP   3 mL at 07/21/24 2013    sodium chloride (PF) 0.9% PF flush 3 mL  3 mL Intracatheter Q8H Dulce Hardy  MD LEENA   3 mL at 07/22/24 0617    sulfamethoxazole-trimethoprim (BACTRIM/SEPTRA) suspension 80 mg  10 mL Per Feeding Tube Once per day on Tuesday Thursday Saturday Dulce Hardy MD   80 mg at 07/20/24 2133    tacrolimus (GENERIC) suspension 1.5 mg  1.5 mg Per G Tube BID IS Dulce Hardy MD   1.5 mg at 07/22/24 0829    traZODone (DESYREL) half-tab 25 mg  25 mg Per J Tube At Bedtime Dulec Hardy MD   25 mg at 07/21/24 2058    valGANciclovir (VALCYTE) solution 450 mg  450 mg Per J Tube Once per day on Tuesday Saturday Dulce Hardy MD   450 mg at 07/20/24 2135    Vitamin D3 (CHOLECALCIFEROL) tablet 50 mcg  50 mcg Per J Tube Once per day on Monday Wednesday Friday Dulce Hardy MD   50 mcg at 07/22/24 0829         Exam/Data:   Vitals  BP (!) 155/72 (BP Location: Right arm, Patient Position: Semi-Cobian's, Cuff Size: Adult Small)   Pulse 94   Temp 98.3  F (36.8  C) (Oral)   Resp (!) 33   Wt 49.1 kg (108 lb 3.9 oz)   LMP 06/07/2014 (Exact Date)   SpO2 97%   BMI 19.17 kg/m       I/O last 3 completed shifts:  In: 1278 [NG/GT:1278]  Out: -   Weight change: -0.1 kg (-3.5 oz)    Vent Mode: CPAP/PS  (Continuous positive airway pressure with Pressure Support)  FiO2 (%): 30 %  Resp Rate (Set): 18 breaths/min  Tidal Volume (Set, mL): 320 mL  PEEP (cm H2O): 5 cmH2O  Pressure Support (cm H2O): 12 cmH2O  Resp: (!) 33      EXAM:  Gen: No acute distress on AC  HEENT: NT, trach midline/intact  CV: RRR, no m/g/r  Resp: crackles with rhonchi R>L; non-labored; no wheeze   Abd: soft, nontender, BS+  Skin: no visible rashes or lesions  Ext: no edema  Neuro: alert to voice, follows commands     Labs:  Complete Blood Count   Recent Labs   Lab 07/22/24  0900 07/22/24  0631 07/21/24  0626 07/20/24  0650 07/19/24  0728 07/18/24  1549 07/18/24  0657   WBC  --  1.3*  --  5.1 2.6*  --  2.5*   HGB 8.7* 8.0* 8.8* 9.7* 7.9*   < > 8.1*   PLT  --  165  --  216 157  --  152    < > = values in this interval not  displayed.     Basic Metabolic Panel  Recent Labs   Lab 07/22/24  0900 07/22/24  0631 07/21/24  2326 07/21/24  1809 07/21/24  1257 07/21/24  0626 07/20/24  1209 07/20/24  0650 07/19/24  1125 07/19/24  0728 07/18/24  0831 07/18/24  0657    134*  --   --   --  137  --  133*  --  133*   < > 132*   POTASSIUM 3.6 3.8  --   --   --  3.6  --  4.4  --  3.8   < > 3.4   CHLORIDE  --  96*  --   --   --   --   --  93*  --  96*  --  92*   CO2  --  28  --   --   --   --   --  27  --  28  --  29   BUN  --  54.1*  --   --   --   --   --  61.6*  --  35.0*  --  63.3*   CR  --  2.11*  --   --   --   --   --  2.49*  --  1.80*  --  2.42*   * 199* 219* 238*   < > 145*   < > 200*   < > 90   < > 128*    < > = values in this interval not displayed.     Liver Function Tests  Recent Labs   Lab 07/22/24  0631 07/20/24  0650   AST 25 41   ALT 38 61*   ALKPHOS 287* 474*   BILITOTAL 0.5 0.6   ALBUMIN 2.8* 3.5     Venous Blood Gas  Recent Labs   Lab 07/22/24  0900 07/21/24  0626 07/19/24  0958 07/18/24  1549   PHV 7.29* 7.35 7.40 7.26*   PCO2V 64* 56* 52* 72*   PO2V 28 48* 26 29   HCO3V 27 28 32* 27   LASHAUN 4.5* 5.2* 6.2* 5.5*   O2PER  --   --  30  --        Radiology: Personally reviewed; radiology read below    XR CHEST PORT 1 VIEW 7/18/2024   Frontal view of the chest. Stable tracheostomy tube. Stable right-sided bronchial stents. Prior sternotomy. Stable heart size. Midline trachea. Improved aeration throughout the left lung. No pneumothorax. Right chest wall/pectoral surgical clips.                                                      Chest CT  7/17/2024                                                                IMPRESSION:   1. Overall improved appearance of the confluent groundglass opacities  primarily affecting the bilateral lower lobes. There is a more  appreciable improvement in the right middle lobe and lingula with  relative sparing of the apices.   2. Status post bilateral lung transplantation with stable  support  devices.  3. Cholelithiasis.  4. Ectatic ascending aorta measuring up to 4.0 cm.  5. Atherosclerosis.    Eyad Harding CNP  Pulmonary Medicine  [facility]  Pager [phone]  Office: [phone]

## 2024-07-22 NOTE — PROGRESS NOTES
RENAL PROGRESS NOTE    CC:  ESRD on HD    ASSESSMENT & PLAN:     ESRD:   Secondary to CNI toxicity.    Has been on in center hemodialysis since 2019.    Has left upper extremity AVG with good function.  Previously on TTS, changing to her historical MWF now, running today     Volume:  Wts stable ~ 49kg, no UF pull last Saturday     Hyponatremia: Mild, borderline  UF with HD.     ACD:   PTA Mircera.    Dosing EPO while inpatient.    Hemoglobin uptrending on low-dose EPO 4000 units 3 times weekly     ESRD MBD:   Mild hyperphosphatemia does not appear to be on binders.    Consider binders if phosphorus persistently >5.5    Mild hypocalcemia when corrected for hypoalbuminemia.    Checking vitamin D level.    Update PTH.       HTN  On monotherapy with Coreg.  -150s    Respiratory Failure  Followed by pulmonary, on ventilator    Hospital Acquired Pneumo    S/p bilat lung transplant 2018  IS: Tacrolimus, azathioprine, prednisone  Prophylaxis: Bactrim, azithromycin, valgancyclovir. Renally dosed.     Anxiety     Protein/calorie deficiency   PEG-J placed    SUBJECTIVE:    Seen in room with  Ranjan  Pt with trach on, able to speak  Has some N/V, this is not atypical it seems   Feeding tube in place  Some breathing challenges, on trach support   NO issues on dialysis  Moving to historical MWF schedule  RN, charge-HD nurse updated.     OBJECTIVE:  Physical Exam   Temp: 98.3  F (36.8  C) Temp src: Oral BP: 139/71 Pulse: 94   Resp: (!) 32 SpO2: 99 % O2 Device: Mechanical Ventilator    Vitals:    07/20/24 0424 07/21/24 0423 07/22/24 0639   Weight: 49 kg (108 lb 0.4 oz) 49.2 kg (108 lb 7.5 oz) 49.1 kg (108 lb 3.9 oz)     Vital Signs with Ranges  Temp:  [98.1  F (36.7  C)-98.3  F (36.8  C)] 98.3  F (36.8  C)  Pulse:  [] 94  Resp:  [24-88] 32  BP: (125-159)/(63-76) 139/71  FiO2 (%):  [30 %] 30 %  SpO2:  [91 %-100 %] 99 %  I/O last 3 completed shifts:  In: 1278 [NG/GT:1278]  Out: -         Patient Vitals for the  past 72 hrs:   Weight   07/22/24 0639 49.1 kg (108 lb 3.9 oz)   07/21/24 0423 49.2 kg (108 lb 7.5 oz)   07/20/24 0424 49 kg (108 lb 0.4 oz)   07/19/24 2233 49.1 kg (108 lb 3.9 oz)   [unfilled]    PHYSICAL EXAM:  General: A&Ox3, NAD  HEENT:  Pupils equal, round, no scleral icterus  NECK:  no carotid bruits, no JVD  RESPIRATORY:  Trach in place  CARDIOVASCULAR:  RRR, no murmur  VOLUME: mild edema lower legs   ABDOMEN:  soft, BS present, non-tender, non-distended  GENITOURINARY:  No rivas   INTEGUMENTARY: Warm, dry, no rash  NEUROLOGIC: grossly intact   Psych: calm, cooperative  ACCESS: Upper Left AVF    LABORATORY STUDIES:     Recent Labs   Lab 07/22/24  0631 07/21/24  0626 07/20/24  0650 07/19/24  0728 07/18/24  1549 07/18/24  0657 07/17/24  0607 07/16/24  0547   WBC 1.3*  --  5.1 2.6*  --  2.5* 2.4* 2.7*   RBC 2.35*  --  2.82* 2.32*  --  2.36* 2.34* 2.36*   HGB 8.0* 8.8* 9.7* 7.9* 10.4* 8.1* 8.0* 7.9*   HCT 25.5*  --  31.2* 24.3*  --  25.2* 24.3* 24.6*     --  216 157  --  152 153 167       Basic Metabolic Panel:  Recent Labs   Lab 07/22/24  0631 07/21/24  2326 07/21/24  1809 07/21/24  1746 07/21/24  1257 07/21/24  0626 07/20/24  1209 07/20/24  0650 07/19/24  1125 07/19/24  0728 07/18/24  1721 07/18/24  1549 07/18/24  0831 07/18/24  0657 07/17/24  0744 07/17/24  0607 07/16/24  0831 07/16/24  0547   *  --   --   --   --  137  --  133*  --  133*  --  137  --  132*  --  133*  --  131*   POTASSIUM 3.8  --   --   --   --  3.6  --  4.4  --  3.8  --  4.5  --  3.4  --  3.2*  --  3.1*   CHLORIDE 96*  --   --   --   --   --   --  93*  --  96*  --   --   --  92*  --  93*  --  92*   CO2 28  --   --   --   --   --   --  27  --  28  --   --   --  29  --  30*  --  24   BUN 54.1*  --   --   --   --   --   --  61.6*  --  35.0*  --   --   --  63.3*  --  39.8*  --  86.1*   CR 2.11*  --   --   --   --   --   --  2.49*  --  1.80*  --   --   --  2.42*  --  1.65*  --  2.84*   * 219* 238* 215* 236* 145*   < > 200*   < >  90   < > 164*   < > 128*   < > 150*   < > 166*   CHELSEY 8.5*  --   --   --   --   --   --  8.6*  --  8.6*  --   --   --  8.1*  --  8.2*  --  7.9*    < > = values in this interval not displayed.       INRNo lab results found in last 7 days.     Recent Labs   Lab Test 07/22/24  0631 07/21/24  0626 07/20/24  0650 06/18/24  1803 06/18/24  1418 03/05/24  0925   INR  --   --   --   --  0.98 0.98   WBC 1.3*  --  5.1   < > 4.5 1.0*   HGB 8.0* 8.8* 9.7*   < > 12.1 10.5*     --  216   < > 147* 96*    < > = values in this interval not displayed.       Personally reviewed current labs       Shayla Mark PA-C  [facility]  [phone]

## 2024-07-22 NOTE — PHARMACY-TRANSPLANT NOTE
Tacrolimus level = 13.4, drawn 7/22 @ 0631.   Last dose given 7/21 @ 1751.   This is a 12.5-hour level.  Current dose: 1.5mg bid   Tacrolimus  goal: 8-10 per transplant team.  New or change in medications with potentially significant interactions with  Tacrolimus: None  Recommendations from transplant team, coordinator, Dede Denise : change dose to 1mg BID  Orders placed  : Yes  Note written : Yes    Donny Reyes RPH on 7/22/2024 at 5:41 PM

## 2024-07-22 NOTE — PROGRESS NOTES
HEMODIALYSIS TREATMENT NOTE    Date: 7/22/2024  Time: 1800     Data:  Pre Wt:   49.1 kg (bed scale)  Desired Wt:   To be established  Post Wt:  49 kg (bed scale)  Weight change: - 0 kg  Ultrafiltration - Post Run Net Total Removed (mL):  0 ml  Vascular Access Status: Fistula patent  Dialyzer Rinse:  Light  Total Blood Volume Processed: 90 L   Total Dialysis (Treatment) Time:   3.5 Hrs  Dialysate Bath: K 2, Ca 2.5  Heparin: Heparin: None     Lab:   No    Interventions:  Dialysis done through left AV Fistula using 15 gauge needles. ,   UF set to 0 Liters, accommodating priming and rinse back volumes. Pt lethargic, pt sleeping during tx.  Meds administered per MAR  See Flowsheet for Crit Profile throughout the run. Treatment has ended safely and  blood is rinsed back completely  Decannulation done post HD, hemostasis is achieved in 20 minutes  Pressure dressing is applied, to be removed after 4-6 hours  Post Tx assessment done. Patient's remains his room in stable condition  Report given to SARAH, RN     Assessment:  A/O x 4, calm and cooperative, denies pain  left arm AV Fistula has good thrill and bruit                Plan:    Per Renal team

## 2024-07-22 NOTE — PLAN OF CARE
Problem: Mechanical Ventilation Invasive  Goal: Effective Communication  Outcome: Adequate for Care Transition   Goal Outcome Evaluation:       RT PROGRESS NOTE     DATA:     CURRENT SETTINGS: Vent Mode: CMV/AC  (Continuous Mandatory Ventilation/ Assist Control)  FiO2 (%): 30 %  Resp Rate (Set): 18 breaths/min  Tidal Volume (Set, mL): 320 mL  PEEP (cm H2O): 5 cmH2O  Pressure Support (cm H2O): 12 cmH2O  Resp: 25                 TRACH TYPE / SIZE:  #6 Shiley placed in 7/8             MODE:   AC             FIO2:   30     ACTION:             THERAPIES:   Xopen QID/Mucomyst BID and Sodium BID             SUCTION:                           FREQUENCY:   X 2                        AMOUNT:   small to moderate                        CONSISTENCY:   thick                        COLOR:   pale yellow             SPONTANEOUS COUGH EFFORT/STRENGTH OF EFFORT (not elicited by suctioning): yes                              WEANING PHASE: 1, none at this shift.                         WEAN MODE:                            WEAN TIME:                           END WEAN REASON:        RESPONSE:             BS:   clear and diminished             VITAL SIGNS:   Blood pressure 125/63, pulse 79, temperature 98.2  F (36.8  C), temperature source Oral, resp. rate 25, weight 49.2 kg (108 lb 7.5 oz), last menstrual period 06/07/2014, SpO2 99%, not currently breastfeeding.               EMOTIONAL NEEDS / CONCERNS:  yes                RISK FOR SELF DECANNULATION:  yes                        RISK DUE TO:  anxiety and agitation                        INTERVENTION/S IN PLACE IS/ARE:  no       NOTE / PLAN:  second night pt. Calm and slept well.   07/19/24 1557  Ventilator weaning phase 1  UNTIL DISCONTINUED        Order Class: Hospital Performed

## 2024-07-22 NOTE — PROGRESS NOTES
07/22/24 1500   Name of Certified Therapeutic Rec Specialist   Name of Certified Therapeutic Rec Specialist RAFAEL Malin   Appointment Type   Type of Therapeutic Rec Session Therapeutic Rec Assessment   General Information   Patient Profile Review See Profile for full history and prior level of function   Daily Contact with Relatives or Friends Phone call;Visit   Community Involvement   Community Involvement Disabled;Other (comments)  (does some bookkeeping for the farm)   Spiritual Practice Taoist   Hobbies/Interests   Word Puzzles Other (see comments)  (only occasionally)   Media   Computer Has own at home   TV / Movies TV   Reading Books;Has own reading materials   Reading Preferences Mystery   Sports / Physical Activities   Sports Fan Other (see comments)  (likes high school sports)   Impression   Open to Socializing with Others Initiates with cues   Barriers to Leisure Endurance;Mobility   Treatment Plan   Equipment and Supplies While on Unit None needed   Assessment Assessment completed, pt was getting dialysis and was very sleepy.  also present to assist in providing leisure interests. Pt is limited due to physical issues, and is very sedentary at home. No leisure needs at this time. will monitor for needs

## 2024-07-22 NOTE — CONSULTS
WOC RN Consult Note    Today's Visit: WOC was consulted for PEG and trach  Full head to toes assessment complated, no concerns noted.  Continue routine cares for PEG and trach  WOC will sign off.  ARIES Costa, RN, PHN, HNB-BC, CWOCN  Pager no longer is use, please contact through [facility] group: [facility] [employer]

## 2024-07-22 NOTE — PROGRESS NOTES
"   07/22/24 1130   Appointment Info   Signing Clinician's Name / Credentials (OT) Cris Chen, OTR/L   Living Environment   People in Home spouse   Current Living Arrangements house   Self-Care   Usual Activity Tolerance fair   Current Activity Tolerance poor   Activity/Exercise/Self-Care Comment Pt pt and spouse, she managed ADLs IND'ly at home prior to hospitalization, although  stated she was \"needing a little more help at the end\"   Instrumental Activities of Daily Living (IADL)   IADL Comments Spouse and pt managed together   General Information   Onset of Illness/Injury or Date of Surgery 06/18/24   Patient/Family Therapy Goal Statement (OT) Walking, breathing better, doing regular activities   Left Upper Extremity (Weight-bearing Status) full weight-bearing (FWB)   Right Upper Extremity (Weight-bearing Status) full weight-bearing (FWB)   Left Lower Extremity (Weight-bearing Status) full weight-bearing (FWB)   Right Lower Extremity (Weight-bearing Status) full weight-bearing (FWB)   General Observations and Info 63 year old female with PMH ILD s/p bilateral lung transplant (2018) c/b recurrent right bronchial stenosis s/p repeat balloon dilation/stenting, repeat opportunistic pneumonia (PJP 2021, aspergillus/stenotrophomonas 11/2023) and viremias (EBV, CMV), and 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 Hospital course complicated by hypotension, delirium, and prolonged respiratory failure.  She has a tracheostomy placement on 7/3 and PEG-J tube placement with IR on 7/5.   Cognitive Status Examination   Cognitive Status Comments Pt answered questions appropriately during eval, more assmt to be completed   Sensory   Sensory Quick Adds sensation intact   Pain Assessment   Patient Currently in Pain No   Range of Motion Comprehensive   Comment, General Range " of Motion BUE PROM WFL   Strength Comprehensive (MMT)   Comment, General Manual Muscle Testing (MMT) Assessment 3/5 BUE at shoulder and bicep   Coordination   Coordination Comments Appears intact   Bed Mobility   Comment (Bed Mobility) Pt refused bed mobility- mod-max A per clinical judgement   Activities of Daily Living   Comment, BADL Assessment/Training Pt refused activity at eval due to anxiety and difficulty breathing   Additional Documentation Comment, BADL Assessment/Training (Row)   Bathing Assessment/Intervention   Mena Level (Bathing) maximum assist (25% patient effort)   Comment, (Bathing) clinical judgement   Upper Body Dressing Assessment/Training   Mena Level (Upper Body Dressing) maximum assist (25% patient effort)   Comment, (Upper Body Dressing) clinical judgement   Lower Body Dressing Assessment/Training   Mena Level (Lower Body Dressing) dependent (less than 25% patient effort)   Comment, (Lower Body Dressing) clinical judgement   Grooming Assessment/Training   Mena Level (Grooming) moderate assist (50% patient effort)   Comment, (Grooming) clinical judgement   Toileting   Mena Level (Toileting) dependent (less than 25% patient effort)   Comment, (Toileting) clinical judgement   Clinical Impression   Criteria for Skilled Therapeutic Interventions Met (OT) Yes, treatment indicated   OT Diagnosis decreased ADLs, weakness, decreased activity tolerance   OT Problem List-Impairments impacting ADL activity tolerance impaired;problems related to;fear & anxiety;strength   Assessment of Occupational Performance 5 or more Performance Deficits   Identified Performance Deficits dressing, g/h, bathing, bed mobility, transfers, toileting   Planned Therapy Interventions (OT) ADL retraining;bed mobility training;strengthening;transfer training;progressive activity/exercise   Risk & Benefits of therapy have been explained care plan/treatment goals reviewed   OT Total  Evaluation Time   OT Eval, Low Complexity Minutes (73090) 10   OT Goals   Therapy Frequency (OT) 5 times/week   OT Predicted Duration/Target Date for Goal Attainment 08/26/24   OT Goals Hygiene/Grooming;Upper Body Dressing;Lower Body Dressing;Upper Body Bathing;Bed Mobility;Toilet Transfer/Toileting;Cognition;OT Goal 1   OT: Hygiene/Grooming supervision/stand-by assist;while standing   OT: Upper Body Dressing Supervision/stand-by assist   OT: Lower Body Dressing Supervision/stand-by assist   OT: Upper Body Bathing Supervision/stand-by assist   OT: Lower Body Bathing Supervision/stand-by assist   OT: Bed Mobility Modified independent   OT: Toilet Transfer/Toileting Supervision/stand-by assist   OT: Cognitive Patient/caregiver will verbalize understanding of cognitive assessment results/recommendations as needed for safe discharge planning   OT: Goal 1 Pt will participate in 15 mins of FB ex to improve BUE strength to 5/5 MMT.   OT Discharge Planning   OT Plan 7/22: bed mobility, sitting EOB, ADLs, BUE ex   OT Discharge Recommendation (DC Rec) [facility]-Motivated patient will benefit from intensive, interdisciplinary therapy.  Anticipate will be able to tolerate 3 hours of therapy per day   OT Rationale for DC Rec Pt will benefit from ongoing therapy to return to IND ADL function.   OT Brief overview of current status Eval completed, short session due to pt anxiety and limited tolerance this date. See POC   Hearing, Vision, and Speech: Expression    Expression of Ideas and Wants Some difficulty   Hearing, Vision, and Speech: Understanding   Understanding Verbal/Non-Verbal Content Understands   Eating   Reason if not Attempted Tube feeding or oral hydration only source of nutrition   Oral Hygiene   Patient Performance Substantial/maximal assist   Discharge Goal (Admit only) SBA   Wash Upper Body   Patient Performance Dependent   Toileting Hygiene   Patient Performance Dependent   Toilet Transfer   Patient  Performance Dependent

## 2024-07-22 NOTE — PROGRESS NOTES
07/22/24 1205   Appointment Info   Signing Clinician's Name / Credentials (PT) Juana Ramsey, JAIR   Rehab Comments (PT) Vent   Living Environment   People in Home spouse   Current Living Arrangements house   Home Accessibility stairs within home;other (see comments)   Living Environment Comments Per chart review, pt has chair lift to navigate stairs   Self-Care   Usual Activity Tolerance fair   Current Activity Tolerance poor   Regular Exercise No   Activity/Exercise/Self-Care Comment Pt was IND with mobility prior to hospitalization however  reports pt only walked in house and to car for dialysis.   General Information   Onset of Illness/Injury or Date of Surgery 06/18/24   Referring Physician Dulce Hardy MD   Pertinent History of Current Problem (include personal factors and/or comorbidities that impact the POC) Per MD H&P: B lung transplant: 2018. presented to the ED on 6/18/2024 with acute on chronic hypoxic hypercapnic respiratory failure. Transferred to MICU and intubated on overnight on 6/18. Workup significant for Stenotrophomonas, Enterococcus, and Candida pneumonia. Course was c/b progression to ARDS (6/21-22). CXR (6/26) with similar pulmonary opacities compared to 6/24, left > right.  The etiology of this groundglass opacity was unclear but ddx includes rejection vs. infection vs. volume overload. Tracheostomy placed on 7/3 due to inability to wean from ventilator. CT chest w/o contrast 7/8 demonstrated waxing and waning mostly improved right lung consolidation now with diffuse groundglass organizing opacities mostly in the lower lobe an middle lobe more than the upper lobe. Slight improvement in the peribronchiolar consolidation in the left lower lobe but there is increased groundglass organization in the left upper lobe. CT on 7/17 demonstrated improvement in bibasilar predominant GGO.   Weight-Bearing Status - LUE full weight-bearing   Weight-Bearing Status - RUE full weight-bearing    Weight-Bearing Status - LLE full weight-bearing   Weight-Bearing Status - RLE full weight-bearing   Cognition   Affect/Mental Status (Cognition) low arousal/lethargic;confused   Follows Commands (Cognition) follows one-step commands   Cognitive Status Comments Pt has increased anxiety   Pain Assessment   Patient Currently in Pain No   Integumentary/Edema   Integumentary/Edema Comments Mild swelling in feet   Range of Motion (ROM)   Range of Motion ROM is WNL   ROM Comment B LEs   Strength (Manual Muscle Testing)   Strength Comments Pt demonstrated groslly 2+/5 to 3-/5 strength [place]   Bed Mobility   Comment, (Bed Mobility) Pt refused supine<>sit -  B Rolling SBA with Bed rail   Transfers   Comment, (Transfers) Refused - anxious   Balance   Balance Comments Pt refused to transfer out of bed   Sensory Examination   Sensory Perception Comments B LE sensations: WFL   Coordination   Coordination no deficits were identified   Muscle Tone   Muscle Tone no deficits were identified   Clinical Impression   Criteria for Skilled Therapeutic Intervention Yes, treatment indicated   PT Diagnosis (PT) Impaired Functional Mobility   Influenced by the following impairments Strength, respiratory status, balance, aerobic conditioning   Functional limitations due to impairments bed mobility, transfers, gait, stairs   Clinical Presentation (PT Evaluation Complexity) evolving   Clinical Presentation Rationale Per clinical judgemetn   Clinical Decision Making (Complexity) moderate complexity   Planned Therapy Interventions (PT) balance training;bed mobility training;gait training;patient/family education;postural re-education;ROM (range of motion);stair training;strengthening;transfer training;home program guidelines;risk factor education;progressive activity/exercise;neuromuscular re-education   Risk & Benefits of therapy have been explained evaluation/treatment results reviewed;care plan/treatment goals  "reviewed;patient;spouse/significant other   PT Total Evaluation Time   PT Eval, Moderate Complexity Minutes (51518) 10   Physical Therapy Goals   PT Frequency 5x/week   PT Predicted Duration/Target Date for Goal Attainment 09/06/24   PT Goals PT Goal 1   PT: Bed Mobility Modified independent;Supine to/from sit;Rolling   PT: Transfers Modified independent;Sit to/from stand;Bed to/from chair   PT: Gait Supervision/stand-by assist;Assistive device;Greater than 200 feet  (With O2 sats and HR WFL)   PT: Goal 1 Complete all functional mobility with O2 sats and HR WFL   Therapeutic Procedure/Exercise   Treatment Detail/Skilled Intervention Facilitated supine B LE strenghtening exs 10-15 reps: ankle pumps, quad sets, glute sets, knee flex, hip fle/ext, hip abd/add - Pt required Min to Mod A to complete full ROM - pt providing minimal assistance. Pt received Ativan prior to therapy - lethargic. O2 sats WFL on vent, RR 38   Therapeutic Activity   Treatment Detail/Skilled Intervention Pt refused OOB activity \"Give me one more day\" Pt very anxious   PT Discharge Planning   PT Plan Bed mobility, transfers, standing, gait, Mot[place]   PT Discharge Recommendation (DC Rec) home with home care physical therapy;Transitional Care Facility   PT Rationale for DC Rec Pt was IND prior to hospitalization but had limited activity tolerance at baseline. Currently pt has elevated anxiety and requires A with all mobility   PT Brief overview of current status PT eval completed bedside. Limited activity tolerance due to elevated anxiety.   Total Session Time   Total Session Time (sum of timed and untimed services) 10   Post Acute Settings Only   What unit is patient on? LTACH   Roll Left and Right   Patient Performance Supervision or touching assistance   Sit to Lying   Reason if not Attempted Refused to perform   Lying to Sitting on Side of Bed   Reason if not Attempted Refused to perform   Sit to Stand   Reason if not Attempted Refused to " perform   Discharge Goal (Admit only) INDEPENDENT   Chair/Bed to Chair Transfer   Reason if not Attempted Refused to perform   Walk 10 feet   Reason if not Attempted Safety concerns   Walk 50 feet with Two Turns   Reason if not Attempted Safety concerns   Walk 150 feet   Reason if not Attempted Safety concerns   Walk 10 Feet on Uneven Surfaces   Reason if not Attempted Safety concerns   Wheel 50 Feet With Two Turns   Reason if not Attempted Safety concerns   Type of Wheelchair/Scooter Used Manual   Wheel 150 feet   Reason if not Attempted Safety concerns   Type of Wheelchair/Scooter Used Manual   1 Step   Reason if not Attempted Safety concerns   4 Steps   Reason if not Attempted Safety concerns   12 Steps   Reason if not Attempted Safety concerns   Car Transfer   Reason if not Attempted Environmental limitations (e.g., lack of equipment,weather constraints)   Picking up objects   Reason if not Attempted Safety concerns

## 2024-07-22 NOTE — PROGRESS NOTES
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 Hospital course complicated by hypotension, delirium, and prolonged respiratory failure.  She has a tracheostomy placement on 7/3 and PEG-J tube placement with IR on 7/5.      Patient arrived on LTACH unit on 7/18 in severe respiratory distress. She complained of nausea, chest pressure, and dsypnea. She is tachypneic and breathing at 41 breaths per minute. She is tripoding and pulse ox reading in low 80's, FiO2 on vent was increased to 50%. She has severe rhonchi and rales in all lung fields, worst in RUL. She was suctioned twice and minimal secretions noted and was given xopenex. Her rhochi and rales of L lung field improved, but not the RUL. She is having severe anxiety and was given 0.5 mg of ativan IVP. She is complaining of chest pressure and tachycardic to 133. EKG done and revealed an incomplete right bundle branch block, no ST-T wave changes in contiguous leads. BP is 215/91- given 10mg of IV hydralazine, BP decreased to 185/99. Spoke with Dr. Sanches and he accepted pt back to [facility].  On 7/19, pt has improved and vitals are now stable and has been transferred back to [facility].    LTACH course:  7/20-7/22: +anxiety, has seroquel as first line and ativan as second line prn.  Not sleeping well and not tolerating weaning well.  Added lantus 5 unit(s) for hyperglycemia.  Increased seroquel to 25 q8h    Assessment & Plan      #Acute on chronic  hypoxic hypercapnic respiratory failure, improving  #Concern for possible immune vs inflammatory process, improving  #HAP, Stenotrophomonas maltophilia, Enterococcus faecalis, and Candida guilliermondi complex  #Severe pulmonary edema  #Acute respiratory distress syndrome, resolved  Presented to the ED on 6/18/2024 with acute on chronic hypoxic hypercapnic respiratory failure. Transferred to MICU and intubated on overnight on 6/18. Workup significant for Stenotrophomonas, Enterococcus, and Candida pneumonia. Course was c/b progression to ARDS (6/21-22). CXR (6/26) with similar pulmonary opacities compared to 6/24, left > right.  The etiology of this groundglass opacity was unclear but ddx includes rejection vs. infection vs. volume overload. Tracheostomy placed on 7/3 due to inability to wean from ventilator. CT chest w/o contrast 7/8 demonstrated waxing and waning mostly improved right lung consolidation now with diffuse groundglass organizing opacities mostly in the lower lobe an middle lobe more than the upper lobe. Slight improvement in the peribronchiolar consolidation in the left lower lobe but there is increased groundglass organization in the left upper lobe. CT on 7/17 demonstrated improvement in bibasilar predominant GGO.  - Pulmonary toilet  - Mucomyst BID  - Hypertonic saline BID, alternate with mucomyst  - Albuterol neb QID  - Antibiotics as below  - Volume management with iHD  - Continue Daily SBT (AM and PM) on pressure support 10/5  - Pulm Transplant following while inpatient              - Plan for transplant pulmonology follow-up in 6-8 weeks     Vent Mode: CMV/AC  (Continuous Mandatory Ventilation/ Assist Control)  FiO2 (%): 30 %  Resp Rate (Set): 18 breaths/min  Tidal Volume (Set, mL): 320 mL  PEEP (cm H2O): 5 cmH2O  Pressure Support (cm H2O): 10 cmH2O  Resp: 25     #Recurrent right middle bronchus stenosis s/p dilation and stent (6/20/2024), stable  Has history (bronchoscopy 2/15/24)  demonstrating narrowing of right mainstem transplant anastomosis and bronchus intermedius. CT chest (6/18/2024) and bronchoscopy (6/19/2024) demonstrated occlusion of right middle bronchus. With concern for post-obstructive pneumonia, interventional pulmonology was consulted, and completed bronchoscopy (6/20/2024) with tissue debulking, right middle bronchus balloon dilation to 11 mm, stent placement in right main bronchus, and bifurcating cast placement in right upper bronchus. IP pulm re-evaluated bronchial stents with BAL and noted that they were open.    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   - Prospera (7/3) 2.26  - DSA (7/3) negative  - PTA nebs  - Fluticasone-viilanterol (breo ellipta) every day - HOLD with respiratory infection  - Montelukast every day   - Immunosuppressants per Tx Pulm:   - PTA Tacrolimus 1.5mg BID  - PTA Prednisone 5 mg daily   - PTA azathioprine - HOLD per Transplant pulmonology with repeat infections  - Peripheral smear (7/9) pending for pancytopenia  - Antibiotic prophylaxis               - Bactrim MWF for PJP ppx (monitor need to adjust pending HD plan)  - CMV weekly monitoring (qTuesday)  - Azithromycin 125mg per pulmonary, continue to monitor QTc  - Continue VGCV ppx (renally dosed, monitor need to adjust pending HD plan) with tentative plan for 3 weeks beyond completion of stress dose steroids   - Steroid Taper per Transplant Pulmonology as below:  Date Daily dose (mg)   7/11 50   7/25 45   8/8 40   8/22 35   9/5 30   9/19 25   10/17 20   11/14 15   12/12 10   1/9 5      - Hold PTA prednisone dose while completing steroid taper as above     #HAP, Stenotrophomonas maltophilia, Enterococcus faecalis,  Aspergillus, and Candida guilliermondi complex  Presented to the ED on 6/18/2024 with SOB and hypercapnic respiratory failure. Found on bronchoscopy (6/19) to have RML obstruction by narrowing bronchus intermedius as well as copious mucopurulent secretions/mucus plugging. Previously treated for Stenotrophonomas/aspergillus pneumonia in 11/2023 with subsequent sputum culture (2/2024) negative for both Stenotrophonomas/Aspergillus. Etiology likely repeat Stenotrophomonas infection vs opportunistic infection from colonized microbe.   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                - Continue posaconazole 300 mg/day                - Complete 14 days of micafungin 150 mg/day today (6/25-7/9)- discontinued   - Continue VGCV prophylaxis  - Weekly CMV VL (Tuesdays, next 7/9), last was 39 (7/2)         - Azithromycin per pulmonary (holding due to QTC, but will follow up)  - TMP/SMX SS daily for PJP PPx (for life given h/o PJP)  - Awaiting susceptibilities on Aspergillus per transplant ID  - Present antibiotics               - Continue posaconazole 300 mg/day per pulm transplant (6/25 to present) for candida guillermondii and asergillus ochraceus on prior BAL  - Past antibiotics              - s/p micafungin 150 mg/day (6/25-7/9)  - s/p IV minocycline 100mg BD x 14 days total (6/20-7/5)- for stenotrophamonas  - PO linezolid 600 mg BID x 10 days (6/25-7/5)- for VRE in urine and BAL cultures  - S/p ceftazidime (6/25-6/28)                - S/p vancomycin (6/18-6/20)              - S/p zosyn (6/18-21)  - S/p Daptomycin (6/21-6/23)  - S/p Meropenem (6/21-6/24)  - S/p Levofloxacin (6/21-6/23)     - BAL fluid (7/3): pink, hazy, cell counts normal range, fluid sent for viral, bacterial and fungal cultures negative to date, cytology negative for malignancy and organisms; preliminary AFB negative     #Hx Aspergillus PNA (11/2023)  #Hx PJP PNA (1/2021)  #Hx CMV viremia, recurrent  #Hx EBV viremia  - Transplant ID consult, appreciate recs  PTA posaconazole (Treatment for hx of aspergillus PNA)  PTA azithromycin 250mg qd (CLAD ppx) (holding)  PTA bactrim (PJP ppx)  -CMV PCR 7/16 positive but <35     #ESRD on iHD (T,Th,Sat)  Hypokalemia  History of ESRD 2/2 Tacrolimus toxicity on HD (MWF). With soft blood pressures, placed RIJ (6/20/2024) and started CRRT (6/20/2024). US of AVF 7/5: arterial inflow patent without inflow stenosis, normal diameter of anastamosis, venous outflow elevated velocity at subclavian- suggestive of recurrent stenosis in venous outflow (area of prior angioplasty in March). IR consulted who noted that IR fistulogram not required at that time, and was able to run iHD on beginning on 7/6 without issue.   - Nephrology consulted and following for ESRD, patient tolerating 3x weekly HD at time of discharge  - Renally-dosed medications  - BMP daily  - HD T/ Th/ Sa schedule now  -replete electrolytes as needed      #Anxiety  Insomnia   Patient has been having anxiety regarding pressure support trials and weaning from the ventilator. Health Psychology consulted and followed patient weekly while hospitalized.   -seroqeul 25 at bedtime-increased to 25 q8h on 7/22  -ramelteon 8 mg at bedtime  -trazadone 25 mg at bedtime   -ativan q4h  prn   -seroquel 25 mg q6h prn      #Toxic metabolic encephalopathy, resolved  Patient noted to have lethargy in setting of sepsis and delirium in the setting of high-dose steroid therapy which improved with management  of underlying sepsis. Patient mental status returned to baseline at day of discharge.   - Quetiapine to 25 mg at bedtime   - Ramalteon at bedtime  - Trazodone 25mg for sleep  - Delirium precautions  - Do not disturb order overnight to promote sleep     #Septic shock, resolved  On 6/19/2024, developed sepsis in the setting of stenotrophomonas pneumonia/enterococcus faecium VRE UTI. Etiology of shock likely distributive iso sepsis; less likely hypovolemic (not pulling volumes with CRRT), medication-associated (weaned off of propofol gtt) or obstructive (low suspicion for PE, echo 6/19 without evidence of cardiac dysfunction). Occasional episodes of hypotension overnight, but self-resolving and have not occurred in a couple days. Related to HAP, resolved with treatment of infection as below.   - s/p SDS & pressors and midodrine, off of all support agents  - MAPs 70s-80s, stable     #Demand Ischemia, resolved    Presented with elevated troponin (peak 499). Not associated with chest pain or hypoxia. EKG without ST elevations/depressions or T wave inversions. Suspect demand ischemia in the setting of sepsis. Will continue to monitor symptoms and continuous telemetry. EKG 7/2 sinus rhythm with PACs.     #Paroxysmal A-Fib, improved  #Pulmonary Hypertension   #Transient arhythmia with palpitations  History of pulmonary hypertension (45 mmHg, echo 10/2023) in the setting of ILD and paroxysmal afib on PTA metoprolol tartrate BID. Not on anticoagulation for afib. Transient unspecified arhythmia overnight (7/9) with palpitations. EKG 7/9 sinus rhythm with fusion complexes and known RBBB. Repeat EKG 7/10 showing sinus rhythm with no RBBB or fusion complexes. Patient given magnesium 1g and 20mEq K+ for K 3.3 this am. No further episodes of palpitations. Currently in sinus rhythm.  - continue PTA metoprolol tartrate 25mg BID  - CTM     #Prolonged Qtc, resolved  Repeat EKG 7/13 with QTC of 483.   - Azithromycin ppx restarted per  pulm transplant  -Qtc 476 (7/17)  -Daily EKG        Severe protein calorie deficiency  PEG-J placed by IR on 7/5, continue tube feeds.   - Nutrition consulted  - Tube feeds 30ml/hr; at goal     #Diarrhea, resolved  On 6/21, had 8 bowel movements. Occurred in the setting of scheduled bowel regimen and starting new antibiotics (meropenem, levofloxacin). C diff negative.   - Holding PRN bowel regimen for loose stools  - Miralax PRN   - Senna prn  - formula changes made per nutrition      #Cholelithiasis with gallbladder wall thickening, resolved  During admission patient found to have up trending LFTs and fever in setting of GB wall thickening on CT abdomen/pelvis, now resolved. MRCP completed showing borderline dilated CBD up to 1.1 cm, no significant intrahepatic biliary dilation, no evidence of choledocholithiasis, no evidence of acute cholecystitis.           #Risk for hyperglycemia with high dose steroids  Stress dose steroids discontinued, resume PTA prednisone 5 mg daily. Blood sugars have remained in appropriate range.  - lantus 5 unit(s) qam  -sliding scale insulin         #Pyuria, Enterococcus faecium VRE and  ESBL E. Coli   Asymptomatic. With progressive IV pressor needs, obtained broad infectious workup which demonstrated UCx (6/19/2024) with Enterococcus faecium VR and ESBL E. Coli. S/p Daptomycin (6/21-6/23) and Meropenem (6/21-6/24).        #Acute on Chronic Normocytic Anemia, stable  #Thrombocytopenia, chronic, improving  History of chronic anemia in the setting of kidney disease (baseline Hgb 10-11). Upon admission, Hgb 9. May be bone marrow suppression in the setting of chronic illness; potential contribution by blood loss with CRRT filter changes (~150-200c). Peripheral smear (6/18/2024) without evidence of schistocytes.                   Diet: Adult Formula Drip Feeding: Continuous Novasource Renal; Jejunostomy; Goal Rate: 30; mL/hr; OK to begin at goal rate. Please abide by 8-hour hang-time for  food safety. Pour 237 mL (1 carton) of formula into TF pump bag per 8-hour batch.    DVT Prophylaxis: Heparin SQ  Basilio Catheter: Not present  Lines: None     Cardiac Monitoring: None  Code Status: Full Code      Clinically Significant Risk Factors              # Hypoalbuminemia: Lowest albumin = 2.8 g/dL at 7/22/2024  6:31 AM, will monitor as appropriate                 #Precipitous drop in Hgb/Hct: Lowest Hgb this hospitalization: 7.9 g/dL. Will continue to monitor and treat/transfuse as appropriate.         # Financial/Environmental Concerns:           Disposition Plan     Medically Ready for Discharge: Anticipated in 5+ Days             Dulce Hardy MD  Hospitalist Service  LTACH  Securely message with [employer] (more info)  Text page via [email or web address] Paging/Directory   ______________________________________________________________________    Interval History   +anxiety  +dyspnea  Cannot tolerate PS today  Intermittent nausea   No vomiting     Physical Exam   Vital Signs: Temp: 97.8  F (36.6  C) Temp src: Axillary BP: 119/60 Pulse: 85   Resp: 27 SpO2: 100 % O2 Device: Mechanical Ventilator Oxygen Delivery: 50 LPM  Weight: 108 lbs 3.93 oz    General:  No acute distress, cachectic , temporal muscle wasting  Eyes:  Sclera non icteric, normal conjuctiva  Neck :  Supple, no lymphadenopathy, trach in place   Lungs: Rhonchi noted in all lung fields   CVS:  S1 and S2, regular rate and rhythem  Abdomen:  Soft, nontender, PEG in place   Musculoskeletal:  No pain or swelling.  No edema  Neuro:  Alert and oriented.  No acute deficit     Medical Decision Making       55 MINUTES SPENT BY ME on the date of service doing chart review, history, exam, documentation & further activities per the note.      Data   Imaging results reviewed over the past 24 hrs:   No results found for this or any previous visit (from the past 24 hour(s)).  Most Recent 3 CBC's:  Recent Labs   Lab Test 07/22/24  0900 07/22/24  0631 07/21/24  0626  07/20/24  0650 07/19/24  0728   WBC  --  1.3*  --  5.1 2.6*   HGB 8.7* 8.0* 8.8* 9.7* 7.9*   MCV  --  109*  --  111* 105*   PLT  --  165  --  216 157     Most Recent 3 BMP's:  Recent Labs   Lab Test 07/22/24  1244 07/22/24  0900 07/22/24  0631 07/21/24  1257 07/21/24  0626 07/20/24  1209 07/20/24  0650 07/19/24  1125 07/19/24  0728   NA  --  135 134*  --  137  --  133*  --  133*   POTASSIUM  --  3.6 3.8  --  3.6  --  4.4  --  3.8   CHLORIDE  --   --  96*  --   --   --  93*  --  96*   CO2  --   --  28  --   --   --  27  --  28   BUN  --   --  54.1*  --   --   --  61.6*  --  35.0*   CR  --   --  2.11*  --   --   --  2.49*  --  1.80*   ANIONGAP  --   --  10  --   --   --  13  --  9   CHELSEY  --   --  8.5*  --   --   --  8.6*  --  8.6*   * 176* 199*   < > 145*   < > 200*   < > 90    < > = values in this interval not displayed.     Most Recent 2 LFT's:  Recent Labs   Lab Test 07/22/24  0631 07/20/24  0650   AST 25 41   ALT 38 61*   ALKPHOS 287* 474*   BILITOTAL 0.5 0.6     Most Recent 3 INR's:  Recent Labs   Lab Test 06/18/24  1418 03/05/24  0925 02/14/24  1050   INR 0.98 0.98 0.96

## 2024-07-22 NOTE — PLAN OF CARE
Problem: Adult Inpatient Plan of Care  Goal: Optimal Comfort and Wellbeing  Outcome: Progressing     Problem: Anxiety Signs/Symptoms  Goal: Optimized Energy Level (Anxiety Signs/Symptoms)  Outcome: Progressing   Goal Outcome Evaluation:       Pt very anxious,but  and  pt don't want prn med's,requested Zofran for anxiety,explained its for nausea,but  he said it works also for anxiety,med given, I didn't see any difference ,later pt requested for Seroquel,med helpful ,pt weaned some time see RT note.had loose dark black BM.VSS. cares met.

## 2024-07-22 NOTE — PLAN OF CARE
Problem: Adult Inpatient Plan of Care  Goal: Plan of Care Review  Description: The Plan of Care Review/Shift note should be completed every shift.  The Outcome Evaluation is a brief statement about your assessment that the patient is improving, declining, or no change.  This information will be displayed automatically on your shift  note.  Outcome: Progressing  Flowsheets (Taken 7/22/2024 1091)  Plan of Care Reviewed With: patient  Goal: Absence of Hospital-Acquired Illness or Injury  Intervention: Identify and Manage Fall Risk  Recent Flowsheet Documentation  Taken 7/21/2024 2348 by Yoana Ling RN  Safety Promotion/Fall Prevention:   room near nurse's station   room door open  Intervention: Prevent Skin Injury  Recent Flowsheet Documentation  Taken 7/21/2024 2348 by Yoana Ling RN  Device Skin Pressure Protection: absorbent pad utilized/changed  Goal: Optimal Comfort and Wellbeing  Intervention: Provide Person-Centered Care  Recent Flowsheet Documentation  Taken 7/21/2024 2348 by Yoana Ling RN  Trust Relationship/Rapport: care explained       Problem: Anxiety Signs/Symptoms  Goal: Optimized Energy Level (Anxiety Signs/Symptoms)  Outcome: Progressing  Goal: Improved Mood Symptoms (Anxiety Signs/Symptoms)  Intervention: Optimize Emotion and Mood  Recent Flowsheet Documentation  Taken 7/21/2024 2348 by Yoana Ling RN  Supportive Measures: active listening utilized  Goal: Enhanced Social, Occupational or Functional Skills (Anxiety Signs/Symptoms)  Intervention: Promote Social, Occupational and Functional Ability  Recent Flowsheet Documentation  Taken 7/21/2024 2348 by Yoana Ling RN  Trust Relationship/Rapport: care explained     Goal Outcome Evaluation:      Plan of Care Reviewed With: patient      Pt was alert and oriented x4. Pt denies pain and discomfort. Skin: buttock was sore applied Calmoseptine cream. Vitals signs were stable, she tolerated care good. Repositioning utilized Q 2hrs.     BP  125/63 (BP Location: Right arm)   Pulse 79   Temp 98.2  F (36.8  C) (Oral)   Resp 25   Wt 49.2 kg (108 lb 7.5 oz)   LMP 06/07/2014 (Exact Date)   SpO2 99%   BMI 19.21 kg/m

## 2024-07-22 NOTE — PLAN OF CARE
Problem: Mechanical Ventilation Invasive  Goal: Optimal Device Function  Outcome: Progressing  Intervention: Optimize Device Care and Function  Recent Flowsheet Documentation  Taken 7/22/2024 5012 by Makenzie Mathews, RT  Airway Safety Measures: all equipment/monitors on and audible  Goal: Mechanical Ventilation Liberation  Outcome: Progressing  Goal: Absence of Device-Related Skin and Tissue Injury  Outcome: Progressing  Goal: Absence of Ventilator-Induced Lung Injury  Outcome: Progressing   Goal Outcome Evaluation:    RT PROGRESS NOTE     DATA:     CURRENT SETTINGS:18, 320, 5             TRACH TYPE / SIZE:  #6 [facility] TG changed on 7/8/24             MODE:   CMV             FIO2:   30%     ACTION:             THERAPIES:   Xopenex Qid, mucomyst bid, nebusal bid             SUCTION:                           FREQUENCY:   x2                        AMOUNT:   sm                        CONSISTENCY: thick                        COLOR: py             SPONTANEOUS COUGH EFFORT/STRENGTH OF EFFORT (not elicited by suctioning): fair productive                              WEANING PHASE:   2                      WEAN MODE : tm/pmv 50% 50lpm                       WEAN TIME: 35 min                      END WEAN REASON: per pt request anxious       RESPONSE:             BS:   coarse              VITAL SIGNS:   SPO2 100%, HR 88-91, RR 24-33             EMOTIONAL NEEDS / CONCERNS:  Anxious                RISK FOR SELF DECANNULATION:  No                               NOTE / PLAN:     One tm wean trial prior to dialysis.  Cont with current poc.

## 2024-07-23 NOTE — CONSULTS
"  Palliative Care Consultation Note  [facility]      Patient: Kecia Blue  Date of Admission:  7/19/2024    Requesting Clinician / Team: Eyda Harding NP  Reason for consult: Goals of care       Recommendations & Counseling     GOALS OF CARE:   Goals are life prolonging with limits.  Patient requesting change in code status to NO CPR, pre arrest intubation ok, currently  has trach/ventilator  Kecia's primary goals are to manage severe anxiety and insomnia.  She has been considering transition to comfort focused care as \"this is  no way to live.\"  She would like to try to have better management of her symptoms for a trial of 1 week to see if she has improvements with her symptoms.  She understands that more sedating medications could result in more difficulty weaning from ventilator.   She is willing to take this risk if it means better control of her anxiety.  I shared that I would discuss with primary team and pulmonary re: their thoughts on appropriate medications changes.   Patient also stated that she is hoping her family \"can all be on the same page\" and doesn't want to make any changes to her goals of care until meeting with them.    Planning to meet on Tuesday 7/30 at 3 pm with patient,  and 3 daughters to revisit goals of care.  She is aware she can change her mind sooner if she chooses to transition to comfort care.   Discussed with Dr. Chaudhari     ADVANCE CARE PLANNING:  Health care directive on file.  Primary  health  care agent is  Howard, secondary Evelyn Jha Holly  There is no POLST form on file, recommend to complete prior to DC.  Code status: Full Code    MEDICAL MANAGEMENT:   Palliative care is not actively  managing  symptoms    Insomnia, Anxiety  Rozerem 8 mg at HS  Trazodone 25 mg at HS, recommend increase to 50 mg  Seroquel 25 mg every 8 hours and every 6 hours PRN first line  Lorazepam 1 mg every 4 hours PRN second line.  Consider decreasing " frequency to every 6 hours PRN.  Lorazepam has been most helpful for anxiety.  Discussed risks  Consider selective serotonin reuptake inhibitor    Shades down at night up during day     today    Patient requesting medicating with antianxiety medication in the morning as she has most anxiety during the day and during pressure support trials.         PSYCHOSOCIAL/SPIRITUAL SUPPORT:   to Ranjan, 3 daughters, grandchildren    Palliative Care will continue to follow. Thank you for the consult and allowing us to aid in the care of Kecia Blue.    These recommendations have been discussed with Dr. Chaudhari and bedside RN.    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 The patient was admitted on 6/18/24 for progressive hypoxia with dyspnea and worsening hypercapnia in ED requiring intubation likely d/t post-obstructive PNA 2/2 right bronchus stenosis. S/p IP bronch (6/20) with laser tissue debulking RMB stenosis, airway balloon dilation of RMB and BI, and placement of stent RMB to BI and bifurcating stent in RUL. S/p CRRT (6/20-7/2), iHD resumed 7/4. Recurrence of paroxysmal afib with RVR first noted 6/25. S/p trach with ENT on 7/3 and GJ tube with IR on 7/5. Treating empirically for possible immune/inflammatory process vs organizing pneumonia (given GG changes on CT) with steroid burst/taper. Gradual improvement in PST on ventilator. She was  "discharged to LTACH 7/18/024 in the afternoon but was readmitted to MICU the same evening for suspected acute on chronic hypoxic respiratory failure, but likely exacerbated by severe anxiety episode. Transferred back to LTAC 7/19.     Today, the patient was seen for:  Goals of care    History of Present Illness   Met with patient,  and daughter Evelyn.   Introduced the role of palliative care as an interdisciplinary team that cares for patients with serious illness to help support symptom management, communication, coping for patients and their families as well as support with medical decision making.    Patient and  provided summary of her medical history since lung transplant in 2018.  Was hospitalized 3 months at time of transplant and then lived a relatively \"normal\" life until she developed PJP pneumonia in 2021, at which time she required tracheostomy and ventilator support.   stated she wasn't on the ventilator for a lengthy period of time and then was able to be decannulated.  Hospitalized on 6/18/24 with respiratory failure and again required tracheostomy.  Family stated it was \"an easy decision\" because she did well weaning from ventilator in 2021.  Unfortunately her recovery has been much more difficult this time.  Patient endorses that she feels \"different\" this time.  Severe anxiety, feels like she can't breathe at times and \"nothing\" seems to help anxiety once \"I go down that path.\"  States she has not had anxiety in the past and this is a new experience.  She worries she will not be able to do the treatments (weaning trials) to be able to improve.  States she is tired and \"this is no way to live.\"  After discussing more with her family today, she states she has a \"checklist\" of interventions she would like to try before making a decision to transition to comfort care, primarily adjusting medications/interventions to help improve sleep and anxiety.  Also nupogen. Would like to " "give this a time limited trial and revisit as needed, but plan to meet again with all daughters and  present next Tuesday.      Not sleeping well at night.  Contributes to anxiety.    Feels like she can't breath when she becomes anxious, despite oxygen saturations being normal.  Not tolerating pressure support trials.  Feels \"terrible\" and painful in chest.  Improves after back on.  Lorazepam has been most helpful medication for anxiety.  Mild pain in gtube site.       Prognosis, Goals, & Planning:   Functional Status just prior to this current hospitalization:  ECOG4 (Completely disabled and dependent on others for selfcare; bedbound)    Prognosis, Goals, and/or Advance Care Planning:  We discussed general treatment options (full/restorative, selective/conservatives, and comfort only/hospice). We then discussed how these specifically apply to Ja.  Based on this discussion, patient has decided to continue with restorative treatments, try to improve symptom management and revisit goals of care in 1 week    Code Status was addressed today:   Yes, We discussed potential risks and rationale of attempting cardiac resuscitation, intubation, and mechanical ventilation.  We also discussed probability of survival as well as quality of life implications.  Based on this discussion, patient or surrogate response/decision: no CPR      Patient's decision making preferences: shared with support from loved ones        Patient has decision-making capacity today for complex decisions: Intact          Coping, Meaning, & Spirituality:   Mood, coping, and/or meaning in the context of serious illness were addressed today: Yes    Social:   Lives with  in [facility]    Medications:  Reviewed this patient's medication profile and medications from this hospitalization.     ROS:  Comprehensive ROS is reviewed and is negative except as here & per HPI:     Physical Exam   Vital Signs with Ranges  Temp:  [97.8  F (36.6  C)-98.7 "  F (37.1  C)] 98  F (36.7  C)  Pulse:  [79-97] 87  Resp:  [21-29] 21  BP: (102-141)/(51-68) 128/59  FiO2 (%):  [25 %-50 %] 25 %  SpO2:  [93 %-100 %] 100 %  Wt Readings from Last 10 Encounters:   07/22/24 49.1 kg (108 lb 3.9 oz)   07/18/24 50.7 kg (111 lb 12.4 oz)   05/16/24 54.4 kg (120 lb)   03/13/24 55.3 kg (122 lb)   03/05/24 57.7 kg (127 lb 4.8 oz)   02/15/24 58 kg (127 lb 13.9 oz)   12/18/23 58.1 kg (128 lb)   12/13/23 58.4 kg (128 lb 11.2 oz)   11/29/23 55.7 kg (122 lb 12.7 oz)   11/21/23 58.7 kg (129 lb 6.4 oz)     108 lbs 3.93 oz    PHYSICAL EXAM:  General:  Fatigued, NAD  Resp:  Trach  in place, on ventilator  Neuro: Oriented X 4, able to communicate by  mouthing words    Data reviewed:  Results for orders placed or performed during the hospital encounter of 07/19/24 (from the past 24 hour(s))   Glucose by meter   Result Value Ref Range    GLUCOSE BY METER POCT 172 (H) 70 - 99 mg/dL   ECG 12-LEAD WITH MUSE ([facility])   Result Value Ref Range    Systolic Blood Pressure  mmHg    Diastolic Blood Pressure  mmHg    Ventricular Rate 98 BPM    Atrial Rate 98 BPM    OK Interval 144 ms    QRS Duration 118 ms     ms    QTc 434 ms    P Axis  degrees    R AXIS -12 degrees    T Axis -43 degrees    Interpretation ECG       Sinus rhythm with Premature atrial complexes  Left ventricular hypertrophy with QRS widening  Nonspecific T wave abnormality  Abnormal ECG  When compared with ECG of 19-JUL-2024 06:47,  Criteria for Septal infarct are no longer Present  Nonspecific T wave abnormality now evident in Lateral leads  Confirmed by JAYE IBARRA MD LOC:[facility] ([id number]) on 7/23/2024 4:41:20 PM     Glucose by meter   Result Value Ref Range    GLUCOSE BY METER POCT 166 (H) 70 - 99 mg/dL   Venous Panel POCT   Result Value Ref Range    pH Venous POCT 7.41 7.32 - 7.43    pCO2 Venous POCT 48 40 - 50 mm Hg    pO2 Venous POCT 38 25 - 47 mm Hg    Bicarbonate Venous POCT 29 (H) 21 - 28 mmol/L    Sodium POCT 138 135 - 145 mmol/L     Potassium POCT 3.3 (L) 3.4 - 5.3 mmol/L    Hemoglobin POCT 7.6 (L) 11.7 - 15.7 g/dL    Oxyhemoglobin Venous POCT 76 (H) 70 - 75 %    Glucose Whole Blood POCT 156 (H) 70 - 99 mg/dL    Base Excess/Deficit (+/-) POCT 5.7 (H) -3.0 - 3.0 mmol/L    Calcium, Ionized Whole Blood POCT 5.1 4.4 - 5.2 mg/dL    O2 Sat, Venous POCT 78 (H) 70 - 75 %    Lactic Acid POCT 0.8 0.7 - 2.0 mmol/L    Narrative    In healthy individuals, oxyhemoglobin (O2Hb) and oxygen saturation (SO2) are approximately equal. In the presence of dyshemoglobins, oxyhemoglobin can be considerably lower than oxygen saturation.   Glucose by meter   Result Value Ref Range    GLUCOSE BY METER POCT 170 (H) 70 - 99 mg/dL   CBC with Platelets & Differential    Narrative    The following orders were created for panel order CBC with Platelets & Differential.  Procedure                               Abnormality         Status                     ---------                               -----------         ------                     CBC with platelets and d...[[id number]]  Abnormal            Final result               Manual Differential[[id number]]          Abnormal            Final result                 Please view results for these tests on the individual orders.   Parathyroid Hormone Intact   Result Value Ref Range    Parathyroid Hormone Intact 91 (H) 15 - 65 pg/mL    Narrative    This result was obtained with the Roche Elecsys PTH STAT assay.   This reference range differs from PTH assays used in other [facility] laboratories.   CBC with platelets and differential   Result Value Ref Range    WBC Count 1.1 (L) 4.0 - 11.0 10e3/uL    RBC Count 2.05 (L) 3.80 - 5.20 10e6/uL    Hemoglobin 7.1 (L) 11.7 - 15.7 g/dL    Hematocrit 22.4 (L) 35.0 - 47.0 %     (H) 78 - 100 fL    MCH 34.6 (H) 26.5 - 33.0 pg    MCHC 31.7 31.5 - 36.5 g/dL    RDW 22.8 (H) 10.0 - 15.0 %    Platelet Count 140 (L) 150 - 450 10e3/uL    NRBCs per 100 WBC 0 <1 /100    Absolute NRBCs 0.0  10e3/uL   Manual Differential   Result Value Ref Range    % Neutrophils 50 %    % Lymphocytes 43 %    % Monocytes 5 %    % Eosinophils 0 %    % Basophils 1 %    % Myelocytes 1 %    Absolute Neutrophils 0.6 (L) 1.6 - 8.3 10e3/uL    Absolute Lymphocytes 0.5 (L) 0.8 - 5.3 10e3/uL    Absolute Monocytes 0.1 0.0 - 1.3 10e3/uL    Absolute Eosinophils 0.0 0.0 - 0.7 10e3/uL    Absolute Basophils 0.0 0.0 - 0.2 10e3/uL    Absolute Myelocytes 0.0 <=0.0 10e3/uL    RBC Morphology Confirmed RBC Indices     Platelet Assessment  Automated Count Confirmed. Platelet morphology is normal.     Automated Count Confirmed. Platelet morphology is normal.    Polychromasia Slight (A) None Seen   ECG 12-LEAD WITH MUSE ([facility])   Result Value Ref Range    Systolic Blood Pressure  mmHg    Diastolic Blood Pressure  mmHg    Ventricular Rate 79 BPM    Atrial Rate 79 BPM    PA Interval 160 ms    QRS Duration 118 ms     ms    QTc 428 ms    P Axis 7 degrees    R AXIS -5 degrees    T Axis -40 degrees    Interpretation ECG       Sinus rhythm  Left ventricular hypertrophy with QRS widening  Nonspecific T wave abnormality  Abnormal ECG  When compared with ECG of 22-JUL-2024 18:02,  Premature atrial complexes are no longer Present  Confirmed by [facility], : ([id number]),  Arvind Henriquez ([id number]) on 7/23/2024 8:34:40 AM     XR Chest Port 1 View    Narrative    EXAM: XR CHEST PORT 1 VIEW  LOCATION: [facility]  DATE: 7/23/2024    INDICATION: s p bslt, resp failure s p trach, s p bronchial stent, routine f u  COMPARISON: 7/18/2024      Impression    IMPRESSION: Tracheostomy tube remains in place. Stable postoperative changes of sternotomy, right thoracotomy and hilar stent placement. Heart size is within normal limits. Triangular opacity in the inferomedial left hemithorax is unchanged. Mixed   interstitial and airspace opacity in the left lower lung field is similar to previous. No new focal consolidation. No  pleural effusion or pneumothorax. Gastrostomy in place.   Glucose by meter   Result Value Ref Range    GLUCOSE BY METER POCT 266 (H) 70 - 99 mg/dL     *Note: Due to a large number of results and/or encounters for the requested time period, some results have not been displayed. A complete set of results can be found in Results Review.         120 MINUTES SPENT BY ME on the date of service doing chart review, history, exam, documentation & further activities per the note.

## 2024-07-23 NOTE — PROGRESS NOTES
Pulmonary Progress Note    Admit Date: 7/19/2024  CODE: Full Code    Assessment/Plan:     Problems:  Acute on chronic hypoxic and hypercapnic respiratory failure suspect d/t post obstructive multi-lobar pneumonia 2/2 right bronchus stenosis s/p stent RMB to BI & bifurcating stent in RUL. Completed antibiotics. S/p trach on prolonged mechanical ventilation   6 Shiley tracheostomy placed 7/3/24: Hx trach in 2021 s/p decann  Oropharyngeal dysphagia status post PEG tube on tube feeds   Possible  on steroid taper   S/p BSLT for ILD 2018 c/b CLAD, possible ACR  - DSA neg 7/3  Immunosuppression: [place]ro goal level 8-10. 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 EBV 1680 and log 3.2 on 7/22  ESRD on iHD  Anxiety with panic attacks: Ativan & Seroquel PRN  Pancytopenia(worsening): Med related? On VGCV as above. Peripheral smear (7/9) with moderate normochromic, normocytic anemia with no increase in erythrocyte regeneration, slight leukopenia, marked thrombocytopenia with normal platelet morphology, and no overt evidence of dysplasia.     Plan/Recommendations:  Repeat EBV PCR in a week.  Follow CMV PCR, if negative than hold VGCV.  Give a dose of Neupagen. CBC & diff daily for now. Low threshold for blood cultures if showing signs of sepsis. Decrease Prednisone to 40mg daily and reset taper from there.   Discussed case with Dr. Orona from pulmonary transplant team; he is in agreement with palliative consult.  Phase 2 vent wean pathway: trach dome with speaking valve 1hr max BID.  Otherwise may attempt PS during the day and continue AC at night   CxR today reviewed: unchanged from previous imaging, no new focal consolidation  Nebs: levalbuterol QID, Mucomyst BID alternating with HTS for stent   VBG improved,  no further vent setting changes necessary   PTA Singulair   Azithromycin 125 mg daily (resumed 7/18 at lower dose, prior held 7/10 for prolonged Qtc; Qtc 428 on 7/23)  Tacro levels qMTh - pharmacy and transplant to adjust dose accordingly. 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 The patient was admitted on 6/18/24 for progressive hypoxia with dyspnea and worsening hypercapnia in ED requiring intubation likely d/t post-obstructive PNA 2/2 right bronchus stenosis. S/p IP bronch (6/20) with laser tissue debulking RMB stenosis, airway balloon dilation of RMB and BI, and placement of stent RMB to BI and bifurcating stent in RUL. S/p CRRT (6/20-7/2), iHD resumed 7/4. Recurrence of paroxysmal afib with RVR first noted 6/25. S/p trach with ENT on 7/3 and GJ tube with IR on 7/5. Treating empirically for possible immune/inflammatory process vs organizing pneumonia (given GG changes on CT) with steroid burst/taper. Gradual improvement in PST on ventilator. She was discharged to LTACH 7/18/024 in the afternoon but was readmitted to MICU the same evening for suspected acute on chronic hypoxic respiratory failure, but likely exacerbated by severe anxiety episode. Transferred back to LTAC 7/19.     Subjective: Resting in bed on AC at present in no distress, denies sob, pain, n/v.  Anxiety/sob with weans this am despite normal hemodynamics per RT.  Patient has been making comments about comfort care to family.  We spoke about this in depth, they are to meet with palliative later this afternoon.     Ventilator weaning results:  7/17: PST 3hr  7/18: PS 10 for total 7hr   7/19: no wean, new admit   7/20: Passed BDT, wore in-line PMV 17min. PS 12 for 50 min(stopped per pt request)  7/21: PS 12 for 1hr 40min(tachypnea, pt request to stop)  7/22: 50L/50%TM/PMV for 35min(stopped d/t anxiety)    Tracheal secretions: suctioned 5x in past 24hr for scant to moderate amounts, thick     Clinical status discussed today with respiratory therapist, hospitalist, patient,  Ranjan, daughter, palliative, lung transplant pulmonary, hospitalist      ROS: 10-system review obtained and negative with the exception of the symptoms noted above.    Medications:     Current Facility-Administered Medications   Medication Dose Route Frequency Provider Last Rate Last Admin    dextrose 10% infusion   Intravenous Continuous PRN Dulce Hardy MD        Patient is already receiving anticoagulation with heparin, enoxaparin (LOVENOX), warfarin (COUMADIN)  or other anticoagulant medication   Does not apply Continuous PRN Dulce Hardy MD        Stop Heparin 60 minutes before end of treatment   Does not apply Continuous PRN Ioana Ho MD         Current Facility-Administered Medications   Medication Dose Route Frequency Provider Last Rate Last Admin    acetylcysteine (MUCOMYST) 20 % nebulizer solution 2 mL  2 mL Nebulization BID Eyad Harding APRN CNP   2 mL at 07/23/24 0812    azithromycin (ZITHROMAX) suspension 125 mg  125 mg Per J Tube Daily Dulce Hardy MD   125 mg at 07/23/24 0833    B and C vitamin Complex with folic acid (NEPHRONEX) liquid 5 mL  5 mL Per Feeding Tube Daily Dulce Hardy MD   5 mL at 07/23/24 0834    carvedilol (COREG) tablet 6.25 mg  6.25 mg Per Feeding Tube BID  Dulce Hardy MD   6.25 mg at 07/23/24 0834    chlorhexidine (PERIDEX) 0.12 % solution 15 mL  15 mL Mouth/Throat Q12H Dulce Hardy MD   15 mL at 07/23/24 0835    [START ON 7/24/2024] epoetin alisha-epbx (RETACRIT) injection 4,000 Units  4,000 Units Intravenous Once per day on Monday Wednesday Friday Dulce Hardy MD        fiber modular (BANATROL TF) packet 1 packet  1 packet Per Feeding Tube TID Dulce Hardy MD   1 packet at 07/23/24 0833    heparin ANTICOAGULANT injection 5,000 Units  5,000 Units Subcutaneous Q8H Dulce Hardy MD   5,000 Units at 07/23/24 0645    insulin aspart (NovoLOG) injection (RAPID ACTING)  1-12 Units Subcutaneous Q6H Dulce Hardy MD   2 Units at 07/23/24 0649    insulin glargine (LANTUS PEN) injection 5 Units  5 Units Subcutaneous QAM AC Dulce Hardy MD   5 Units at 07/23/24 0832    levalbuterol (XOPENEX) neb solution 0.63 mg  0.63 mg Nebulization 4x Daily Eyad Harding APRN CNP   0.63 mg at 07/23/24 0812    montelukast (SINGULAIR) tablet 10 mg  10 mg Per J Tube At Bedtime Dulce Hardy MD   10 mg at 07/22/24 2056    pantoprazole (PROTONIX) 2 mg/mL suspension 40 mg  40 mg Per J Tube At Bedtime Dulce Hardy MD   40 mg at 07/22/24 2057    polyethylene glycol (MIRALAX) Packet 17 g  17 g Per Feeding Tube Daily Dulce Hardy MD        posaconazole (NOXAFIL) DR tablet TBEC 300 mg  300 mg Per Feeding Tube QAM Dulce Hardy MD   300 mg at 07/22/24 1255    [START ON 7/25/2024] predniSONE (DELTASONE) tablet 45 mg  45 mg Per J Tube Daily Eyad Harding APRN CNP        predniSONE (DELTASONE) tablet 50 mg  50 mg Per J Tube Daily Eyad Harding APRN CNP   50 mg at 07/23/24 0834    Prosource TF20 ENfit Compatibl EN LIQD (PROSOURCE TF20) packet 60 mL  1 packet Per Feeding Tube Daily Dulce Hardy MD   60 mL at 07/23/24 0833    QUEtiapine (SEROquel) tablet 25 mg  25 mg Per J Tube Q8H Dulce Hardy MD   25 mg at  07/23/24 0445    ramelteon (ROZEREM) tablet 8 mg  8 mg Per J Tube At Bedtime Dulce Hardy MD   8 mg at 07/22/24 2055    sodium chloride (NEBUSAL) 3 % neb solution 3 mL  3 mL Nebulization 2 times daily Eyad Harding APRN CNP   3 mL at 07/22/24 1939    sodium chloride (PF) 0.9% PF flush 3 mL  3 mL Intracatheter Q8H Dulce Hardy MD   3 mL at 07/23/24 0651    sulfamethoxazole-trimethoprim (BACTRIM/SEPTRA) suspension 80 mg  10 mL Per Feeding Tube Once per day on Tuesday Thursday Saturday Dulce Hardy MD   80 mg at 07/20/24 2133    tacrolimus (GENERIC) suspension 1 mg  1 mg Per G Tube BID IS Dulce Hardy MD   1 mg at 07/23/24 0833    traZODone (DESYREL) half-tab 25 mg  25 mg Per J Tube At Bedtime Dulce Hardy MD   25 mg at 07/22/24 2056    valGANciclovir (VALCYTE) solution 450 mg  450 mg Per J Tube Once per day on Tuesday Saturday Dulce Hardy MD   450 mg at 07/20/24 2135    Vitamin D3 (CHOLECALCIFEROL) tablet 50 mcg  50 mcg Per J Tube Once per day on Monday Wednesday Friday Dulce Hardy MD   50 mcg at 07/22/24 0829         Exam/Data:   Vitals  /59 (BP Location: Right arm)   Pulse 79   Temp 98.4  F (36.9  C) (Oral)   Resp 21   Wt 49.1 kg (108 lb 3.9 oz)   LMP 06/07/2014 (Exact Date)   SpO2 100%   BMI 19.17 kg/m       I/O last 3 completed shifts:  In: 632 [I.V.:3; NG/GT:629]  Out: 0   Weight change:     Vent Mode: CPAP/PS  (Continuous positive airway pressure with Pressure Support)  FiO2 (%): 25 %  Resp Rate (Set): 18 breaths/min  Tidal Volume (Set, mL): 360 mL  PEEP (cm H2O): 5 cmH2O  Pressure Support (cm H2O): 15 cmH2O  Resp: 21      EXAM:  Gen: No acute distress on AC  HEENT: NT, trach midline/intact  CV: RRR, no m/g/r  Resp: crackles with rhonchi R>L; non-labored; no wheeze   Abd: soft, nontender, BS+  Skin: no visible rashes or lesions  Ext: no edema  Neuro: alert, follows commands     Labs:  Complete Blood Count   Recent Labs   Lab 07/23/24  0701  07/23/24  0554 07/22/24  0900 07/22/24  0631 07/21/24  0626 07/20/24  0650 07/19/24  0728   WBC 1.1*  --   --  1.3*  --  5.1 2.6*   HGB 7.1* 7.6* 8.7* 8.0*   < > 9.7* 7.9*   *  --   --  165  --  216 157    < > = values in this interval not displayed.     Basic Metabolic Panel  Recent Labs   Lab 07/23/24  0617 07/23/24  0554 07/23/24  0004 07/22/24  1745 07/22/24  1244 07/22/24  0900 07/22/24  0631 07/21/24  1257 07/21/24  0626 07/20/24  1209 07/20/24  0650 07/19/24  1125 07/19/24  0728 07/18/24  0831 07/18/24  0657   NA  --  138  --   --   --  135 134*  --  137  --  133*  --  133*   < > 132*   POTASSIUM  --  3.3*  --   --   --  3.6 3.8  --  3.6  --  4.4  --  3.8   < > 3.4   CHLORIDE  --   --   --   --   --   --  96*  --   --   --  93*  --  96*  --  92*   CO2  --   --   --   --   --   --  28  --   --   --  27  --  28  --  29   BUN  --   --   --   --   --   --  54.1*  --   --   --  61.6*  --  35.0*  --  63.3*   CR  --   --   --   --   --   --  2.11*  --   --   --  2.49*  --  1.80*  --  2.42*   * 156* 166* 172*   < > 176* 199*   < > 145*   < > 200*   < > 90   < > 128*    < > = values in this interval not displayed.     Liver Function Tests  Recent Labs   Lab 07/22/24  0631 07/20/24  0650   AST 25 41   ALT 38 61*   ALKPHOS 287* 474*   BILITOTAL 0.5 0.6   ALBUMIN 2.8* 3.5     Venous Blood Gas  Recent Labs   Lab 07/23/24  0554 07/22/24  0900 07/21/24  0626 07/19/24  0958   PHV 7.41 7.29* 7.35 7.40   PCO2V 48 64* 56* 52*   PO2V 38 28 48* 26   HCO3V 29* 27 28 32*   LASHAUN 5.7* 4.5* 5.2* 6.2*   O2PER  --   --   --  30       Radiology: Personally reviewed; radiology read below    XR CHEST PORT 7/23/2024  Tracheostomy tube remains in place. Stable postoperative changes of sternotomy, right thoracotomy and hilar stent placement. Heart size is within normal limits. Triangular opacity in the inferomedial left hemithorax is unchanged. Mixed interstitial and airspace opacity in the left lower lung field is similar to  previous. No new focal consolidation. No pleural effusion or pneumothorax. Gastrostomy in place.    XR CHEST PORT 1 VIEW 7/18/2024   Frontal view of the chest. Stable tracheostomy tube. Stable right-sided bronchial stents. Prior sternotomy. Stable heart size. Midline trachea. Improved aeration throughout the left lung. No pneumothorax. Right chest wall/pectoral surgical clips.                                                      Chest CT  7/17/2024                                                                IMPRESSION:   1. Overall improved appearance of the confluent groundglass opacities  primarily affecting the bilateral lower lobes. There is a more  appreciable improvement in the right middle lobe and lingula with  relative sparing of the apices.   2. Status post bilateral lung transplantation with stable support  devices.  3. Cholelithiasis.  4. Ectatic ascending aorta measuring up to 4.0 cm.  5. Atherosclerosis.    Eyad Harding CNP  Pulmonary Medicine  [facility]  Pager [phone]  Office: [phone]

## 2024-07-23 NOTE — PLAN OF CARE
Problem: Adult Inpatient Plan of Care  Goal: Optimal Comfort and Wellbeing  Intervention: Provide Person-Centered Care  Recent Flowsheet Documentation  Taken 7/22/2024 1708 by Nahomi Belcher RN  Trust Relationship/Rapport: care explained  Taken 7/22/2024 1212 by Nahomi Blecher RN  Trust Relationship/Rapport: care explained     Problem: Mechanical Ventilation Invasive  Goal: Effective Communication  Intervention: Ensure Effective Communication  Recent Flowsheet Documentation  Taken 7/22/2024 1708 by Nahomi Belcher RN  Family/Support System Care: caregiver stress acknowledged  Trust Relationship/Rapport: care explained  Taken 7/22/2024 1212 by Nahomi Belcher RN  Family/Support System Care: caregiver stress acknowledged  Trust Relationship/Rapport: care explained  Goal: Optimal Device Function  Outcome: Progressing     Problem: Anxiety Signs/Symptoms  Goal: Optimized Energy Level (Anxiety Signs/Symptoms)  Outcome: Progressing  Goal: Enhanced Social, Occupational or Functional Skills (Anxiety Signs/Symptoms)  Intervention: Promote Social, Occupational and Functional Ability  Recent Flowsheet Documentation  Taken 7/22/2024 1708 by Nahomi Belcher RN  Trust Relationship/Rapport: care explained  Taken 7/22/2024 1212 by Nahomi Belcher RN  Trust Relationship/Rapport: care explained   Goal Outcome Evaluation:         Pt dialyzed 2nd run,tolerated well,no prn given.prn ativan and po ativan given per pt and  req,med's helpful.pt weaned some time.VSS.had loose BM.EKG done for Seroquel schedule started and lantus.BS elevated.cares met.

## 2024-07-23 NOTE — PLAN OF CARE
Problem: Mechanical Ventilation Invasive  Goal: Effective Communication  Outcome: Adequate for Care Transition   Goal Outcome Evaluation:       RT PROGRESS NOTE     DATA:     CURRENT SETTINGS: Vent Mode: CMV/AC  (Continuous Mandatory Ventilation/ Assist Control)  FiO2 (%): 25 %  Resp Rate (Set): 18 breaths/min  Tidal Volume (Set, mL): 360 mL  PEEP (cm H2O): 5 cmH2O  Resp: 22                 TRACH TYPE / SIZE:  #6 Shiley placed in 7/8             MODE:   AC             FIO2:   30     ACTION:             THERAPIES:   Xopen QID/Mucomyst BID and Sodium BID             SUCTION:                           FREQUENCY:   X 4                        AMOUNT:   small to moderate                        CONSISTENCY:   thick                        COLOR:   pale yellow to white frothy             SPONTANEOUS COUGH EFFORT/STRENGTH OF EFFORT (not elicited by suctioning): yes                              WEANING PHASE: 1, days                         WEAN MODE:                            WEAN TIME:                           END WEAN REASON:        RESPONSE:             BS:   clear and diminished             VITAL SIGNS:   Blood pressure 113/57, pulse 91, temperature 98  F (36.7  C), temperature source Oral, resp. rate 22, weight 49.1 kg (108 lb 3.9 oz), last menstrual period 06/07/2014, SpO2 93%, not currently breastfeeding.               EMOTIONAL NEEDS / CONCERNS:  yes                RISK FOR SELF DECANNULATION:  yes                        RISK DUE TO:  anxiety and agitation                        INTERVENTION/S IN PLACE IS/ARE:  no       NOTE / PLAN:  3rd night pt. Is Calm and slept well.    Latest Reference Range & Units 07/23/24 05:54   pH Venous POCT 7.32 - 7.43  7.41   Base Excess/Deficit (+/-) POCT -3.0 - 3.0 mmol/L 5.7 (H)   pCO2 Venous POCT 40 - 50 mm Hg 48   pO2 Venous POCT 25 - 47 mm Hg 38   O2 Sat, Venous POCT 70 - 75 % 78 (H)   Oxyhemoglobin Venous POCT 70 - 75 % 76 (H)   Bicarbonate Venous POCT 21 - 28 mmol/L 29 (H)    (H): Data is abnormally high    07/19/24 1557  Ventilator weaning phase 1  UNTIL DISCONTINUED        Order Class: Hospital Performed

## 2024-07-23 NOTE — PROGRESS NOTES
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 Hospital course complicated by hypotension, delirium, and prolonged respiratory failure.  She has a tracheostomy placement on 7/3 and PEG-J tube placement with IR on 7/5.      Patient arrived on LTACH unit on 7/18 in severe respiratory distress. She complained of nausea, chest pressure, and dsypnea. She is tachypneic and breathing at 41 breaths per minute. She is tripoding and pulse ox reading in low 80's, FiO2 on vent was increased to 50%. She has severe rhonchi and rales in all lung fields, worst in RUL. She was suctioned twice and minimal secretions noted and was given xopenex. Her rhochi and rales of L lung field improved, but not the RUL. She is having severe anxiety and was given 0.5 mg of ativan IVP. She is complaining of chest pressure and tachycardic to 133. EKG done and revealed an incomplete right bundle branch block, no ST-T wave changes in contiguous leads. BP is 215/91- given 10mg of IV hydralazine, BP decreased to 185/99. Spoke with Dr. Sanches and he accepted pt back to [facility].  On 7/19, pt has improved and vitals are now stable and has been transferred back to [place].    LTACH course:  7/20-7/22: +anxiety, has seroquel as first line and ativan as second line prn.  Not sleeping well and not tolerating weaning well.  Added lantus 5 unit(s) for hyperglycemia.  Increased seroquel to 25 q8h  7/23-7/28:  Met with palliative medicine per  limited ventilator weaning. Code status updated to DNR/I OK; requesting improved anxiety control and aware this may continue to limit ventilator weaning attempts. Pancytopenia withOUT absolute neutropenia.    Assessment & Plan      #Acute on chronic hypoxic hypercapnic respiratory failure, improving  #Concern for possible immune vs inflammatory process, improving  #HAP, Stenotrophomonas maltophilia, Enterococcus faecalis, and Candida guilliermondi complex  #Severe pulmonary edema  #Acute respiratory distress syndrome, resolved  Presented to the ED on 6/18/2024 with acute on chronic hypoxic hypercapnic respiratory failure. Transferred to MICU and intubated on overnight on 6/18. Workup significant for Stenotrophomonas, Enterococcus, and Candida pneumonia. Course was c/b progression to ARDS (6/21-22). Tracheostomy placed on 7/3 due to inability to wean from ventilator.  - Pulmonary toilet  - Mucomyst BID  - Hypertonic saline BID, alternate with mucomyst  - Albuterol neb QID  - Antibiotics as below  -pulmonology following for ventilator weaning (phase 2)         #Recurrent right middle bronchus stenosis s/p dilation and stent (6/20/2024), stable  Has history (bronchoscopy 2/15/24) demonstrating narrowing of right mainstem transplant anastomosis and bronchus intermedius. CT chest (6/18/2024) and bronchoscopy (6/19/2024) demonstrated occlusion of right middle bronchus. With concern for post-obstructive pneumonia, interventional pulmonology was consulted, and completed bronchoscopy (6/20/2024) with tissue debulking, right middle bronchus balloon dilation to 11 mm, stent placement in right main bronchus, and bifurcating cast placement in right upper bronchus. IP pulm re-evaluated bronchial stents with BAL and noted that they were open.    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   - Prospera (7/3) 2.26  - DSA (7/3) negative  - PTA nebs  - Fluticasone-viilanterol (breo ellipta) every day - HOLD with respiratory infection  - Montelukast every day   - Immunosuppressants per Tx Pulm:   -Tacrolimus 1mg BID  - Prednisone 40mg PO every day (baseline chronic 5mg Daily)  -azathioprine - HOLD per Transplant pulmonology with repeat infections  - Peripheral smear (7/9) pending for pancytopenia  - Antibiotic prophylaxis               - Bactrim MWF for PJP ppx (monitor need to adjust pending HD plan)  - CMV weekly monitoring (qTuesday)  - Azithromycin 125mg per pulmonary, continue to monitor QTc  - Continue VGCV ppx (renally dosed, monitor need to adjust pending HD plan) with tentative plan for 3 weeks beyond completion of stress dose steroids   - Steroid Taper per Transplant Pulmonology as below:  Date Daily dose (mg)   7/11 50   7/25 45   8/8 40   8/22 35   9/5 30   9/19 25   10/17 20   11/14 15   12/12 10   1/9 5      - Hold PTA prednisone dose while completing steroid taper as above     #HAP, Stenotrophomonas maltophilia, Enterococcus faecalis, Aspergillus, and Candida guilliermondi complex  Presented to the ED on 6/18/2024 with SOB and hypercapnic respiratory failure. Found on bronchoscopy (6/19) to have RML obstruction by narrowing bronchus intermedius as well as copious mucopurulent secretions/mucus plugging. Previously treated for Stenotrophonomas/aspergillus pneumonia in 11/2023 with subsequent sputum culture (2/2024) negative for both Stenotrophonomas/Aspergillus. Etiology likely repeat Stenotrophomonas infection vs opportunistic infection from colonized microbe.   - Workup  - 6/18 sputum cx: Stenotrophomonas maltophilia (ceftazidime and levofloxacin R)  - 6/19 BAL cx: Stenotrophomonas maltophilia, Enterococcus faecalis (gentamicin R),  Aspergillus (speciation in process)  - 6/21 BAL cx: Stenotrophomonas maltophilia and Enterococcus faecalis VRE  - 6/24 sputum cx: Stenotrophomonas maltophilia, Enterococcus faecalis VRE, and Candida guilliermondi complex  - Transplant ID recommendations prior to LTACH transfer:               - Continue posaconazole 300 mg/day   - Continue VGCV prophylaxis  - Weekly CMV VL (Tuesdays, next 7/9), last was 39 (7/2)         - Azithromycin per pulmonary (holding due to QTC, but will follow up)  - TMP/SMX SS daily for PJP PPx (for life given h/o PJP)  - Awaiting susceptibilities on Aspergillus per transplant ID  - Present antibiotics               - Continue posaconazole 300 mg/day per pulm transplant (6/25 to present) for candida guillermondii and asergillus ochraceus on prior BAL  - BAL fluid (7/3): pink, hazy, cell counts normal range, fluid sent for viral, bacterial and fungal cultures negative to date, cytology negative for malignancy and organisms; preliminary AFB negative     #Hx Aspergillus PNA (11/2023)  #Hx PJP PNA (1/2021)  #Hx CMV viremia, recurrent  #Hx EBV viremia  - Transplant ID consult, appreciate recs  PTA posaconazole (Treatment for hx of aspergillus PNA)  PTA azithromycin 250mg qd (CLAD ppx) (holding)  PTA bactrim (PJP ppx)  -CMV PCR 7/16 positive but <35     #ESRD on iHD (T,Th,Sat)  Hypokalemia  History of ESRD 2/2 Tacrolimus toxicity on HD (MWF). With soft blood pressures, placed RIJ (6/20/2024) and started CRRT (6/20/2024). US of AVF 7/5: arterial inflow patent without inflow stenosis, normal diameter of anastamosis, venous outflow elevated velocity at subclavian- suggestive of recurrent stenosis in venous outflow (area of prior angioplasty in March). IR consulted who noted that IR fistulogram not required at that time, and was able to run iHD on beginning on 7/6 without issue.   - Nephrology consulted and following for ESRD, patient tolerating 3x weekly HD at time of discharge  - Renally-dosed  medications  - BMP daily  - HD T/ Th/ Sa schedule now  -replete electrolytes as needed      #Anxiety  Insomnia   Patient has been having anxiety regarding pressure support trials and weaning from the ventilator. Health Psychology consulted and followed patient weekly while hospitalized.   -seroqeul 25mg PO TID  -ramelteon 8 mg at bedtime  -trazadone 50mg at bedtime   -ativan 1mg PO q4h\prn   -seroquel 25 mg q6h\prn      #Toxic metabolic encephalopathy, resolved  Patient noted to have lethargy in setting of sepsis and delirium in the setting of high-dose steroid therapy which improved with management of underlying sepsis. Patient mental status returned to baseline at day of discharge.   - Quetiapine to 25 mg at bedtime   - Ramalteon 8mg PO at bedtime  - Trazodone 50mg for sleep  - Delirium precautions  - Do not disturb order overnight to promote sleep     #Septic shock, resolved  On 6/19/2024, developed sepsis in the setting of stenotrophomonas pneumonia/enterococcus faecium VRE UTI. Resolved.     #Demand Ischemia, resolved    Presented with elevated troponin (peak 499). Not associated with chest pain or hypoxia. EKG without ST elevations/depressions or T wave inversions. Suspect demand ischemia in the setting of sepsis. Will continue to monitor symptoms and continuous telemetry. EKG 7/2 sinus rhythm with PACs.     #Paroxysmal A-Fib, improved  #Pulmonary Hypertension   #Transient arhythmia with palpitations  History of pulmonary hypertension (45 mmHg, echo 10/2023) in the setting of ILD and paroxysmal afib on PTA metoprolol tartrate BID. Not on anticoagulation for afib. Transient unspecified arhythmia overnight (7/9) with palpitations. EKG 7/9 sinus rhythm with fusion complexes and known RBBB. Repeat EKG 7/10 showing sinus rhythm with no RBBB or fusion complexes. Patient given magnesium 1g and 20mEq K+ for K 3.3 this am. No further episodes of palpitations. Currently in sinus rhythm.  - continue PTA metoprolol  tartrate 25mg BID  - CTM     #Prolonged Qtc, resolved  Repeat EKG 7/13 with QTC of 483.   - Azithromycin ppx restarted per pulm transplant  -Qtc 476 (7/17)  -Daily EKG        Severe protein calorie deficiency  PEG-J placed by IR on 7/5, continue tube feeds.   - Nutrition consulted  - Tube feeds 30ml/hr; at goal     #Diarrhea, resolved  On 6/21, had 8 bowel movements. Occurred in the setting of scheduled bowel regimen and starting new antibiotics (meropenem, levofloxacin). C diff negative.   - Holding PRN bowel regimen for loose stools  - Miralax PRN   - Senna prn  - formula changes made per nutrition      #Cholelithiasis with gallbladder wall thickening, resolved  During admission patient found to have up trending LFTs and fever in setting of GB wall thickening on CT abdomen/pelvis, now resolved. MRCP completed showing borderline dilated CBD up to 1.1 cm, no significant intrahepatic biliary dilation, no evidence of choledocholithiasis, no evidence of acute cholecystitis.     #Risk for hyperglycemia with high dose steroids  Stress dose steroids discontinued, resume PTA prednisone 5 mg daily. Blood sugars have remained in appropriate range.  - lantus 5 unit(s) qam  -sliding scale insulin         #Pyuria, Enterococcus faecium VRE and  ESBL E. Coli   Asymptomatic. With progressive IV pressor needs, obtained broad infectious workup which demonstrated UCx (6/19/2024) with Enterococcus faecium VR and ESBL E. Coli. S/p Daptomycin (6/21-6/23) and Meropenem (6/21-6/24).        #Acute on Chronic Normocytic Anemia, stable  #Thrombocytopenia, chronic, improving  History of chronic anemia in the setting of kidney disease (baseline Hgb 10-11). Upon admission, Hgb 9. May be bone marrow suppression in the setting of chronic illness; potential contribution by blood loss with CRRT filter changes (~150-200c). Peripheral smear (6/18/2024) without evidence of schistocytes.      FOLLOW UPS NEEDED  -pulmonology for dedicated bronchoscopy  for stent visualisation (anticipated 8/15)      ANTIBIOTICS COURSES:              - s/p micafungin 150 mg/day (6/25-7/9)  - s/p IV minocycline 100mg BD x 14 days total (6/20-7/5)- for stenotrophamonas  - PO linezolid 600 mg BID x 10 days (6/25-7/5)- for VRE in urine and BAL cultures  - S/p ceftazidime (6/25-6/28)               - S/p vancomycin (6/18-6/20)              - S/p zosyn (6/18-21)  - S/p Daptomycin (6/21-6/23)  - S/p Meropenem (6/21-6/24)  - S/p Levofloxacin (6/21-6/23)        Diet: Adult Formula Drip Feeding: Continuous Novasource Renal; Jejunostomy; Goal Rate: 30; mL/hr; OK to begin at goal rate. Please abide by 8-hour hang-time for food safety. Pour 237 mL (1 carton) of formula into TF pump bag per 8-hour batch.    DVT Prophylaxis: Heparin SQ  Basilio Catheter: Not present  Lines: None     Cardiac Monitoring: None  Code Status: Full Code      Clinically Significant Risk Factors        # Hypokalemia: Lowest K = 3.3 mmol/L in last 2 days, will replace as needed       # Hypoalbuminemia: Lowest albumin = 2.8 g/dL at 7/22/2024  6:31 AM, will monitor as appropriate                 #Precipitous drop in Hgb/Hct: Lowest Hgb this hospitalization: 7.1 g/dL. Will continue to monitor and treat/transfuse as appropriate.         # Financial/Environmental Concerns:           Disposition Plan     Medically Ready for Discharge: Anticipated in 5+ Days             Casa Chaudhari MD  Hospitalist Service  LTACH  Securely message with [employer] (more info)  Text page via [employer] Paging/Directory   ______________________________________________________________________    Interval History   No overnight events. Difficulty with ventilator weaning, family requested palliative consult/discussion for possible adjustments to goals of care--limited ventilator weaning.     Physical Exam   Vital Signs: Temp: 98  F (36.7  C) Temp src: Oral BP: 128/59 Pulse: 87   Resp: 21 SpO2: 100 % O2 Device: Mechanical Ventilator Oxygen Delivery: 50  LPM  Weight: 108 lbs 3.93 oz    General:  No acute distress, cachectic , temporal muscle wasting  Eyes:  Sclera non icteric, normal conjuctiva  Neck :  Supple, no lymphadenopathy, trach in place   Lungs: Rhonchi noted in all lung fields   CVS:  S1 and S2, regular rate and rhythem  Abdomen:  Soft, nontender, PEG in place   Musculoskeletal:  No pain or swelling.  No edema  Neuro:  Alert and oriented.  No acute deficit     Medical Decision Making       55 MINUTES SPENT BY ME on the date of service doing chart review, history, exam, documentation & further activities per the note.  MANAGEMENT DISCUSSED with the following over the past 24 hours: patient   NOTE(S)/MEDICAL RECORDS REVIEWED over the past 24 hours: RN notes, consultant notes, RT notes  Tests ORDERED & REVIEWED in the past 24 hours:  - See lab/imaging results included in the data section of the note      Data   Imaging results reviewed over the past 24 hrs:   No results found for this or any previous visit (from the past 24 hour(s)).  Most Recent 3 CBC's:  Recent Labs   Lab Test 07/23/24  0701 07/23/24  0554 07/22/24  0900 07/22/24  0631 07/21/24  0626 07/20/24  0650   WBC 1.1*  --   --  1.3*  --  5.1   HGB 7.1* 7.6* 8.7* 8.0*   < > 9.7*   *  --   --  109*  --  111*   *  --   --  165  --  216    < > = values in this interval not displayed.     Most Recent 3 BMP's:  Recent Labs   Lab Test 07/23/24  0617 07/23/24  0554 07/23/24  0004 07/22/24  1244 07/22/24  0900 07/22/24  0631 07/20/24  1209 07/20/24  0650 07/19/24  1125 07/19/24  0728   NA  --  138  --   --  135 134*   < > 133*  --  133*   POTASSIUM  --  3.3*  --   --  3.6 3.8   < > 4.4  --  3.8   CHLORIDE  --   --   --   --   --  96*  --  93*  --  96*   CO2  --   --   --   --   --  28  --  27  --  28   BUN  --   --   --   --   --  54.1*  --  61.6*  --  35.0*   CR  --   --   --   --   --  2.11*  --  2.49*  --  1.80*   ANIONGAP  --   --   --   --   --  10  --  13  --  9   CHELSEY  --   --   --   --    --  8.5*  --  8.6*  --  8.6*   * 156* 166*   < > 176* 199*   < > 200*   < > 90    < > = values in this interval not displayed.     Most Recent 2 LFT's:  Recent Labs   Lab Test 07/22/24  0631 07/20/24  0650   AST 25 41   ALT 38 61*   ALKPHOS 287* 474*   BILITOTAL 0.5 0.6     Most Recent 3 INR's:  Recent Labs   Lab Test 06/18/24  1418 03/05/24  0925 02/14/24  1050   INR 0.98 0.98 0.96

## 2024-07-23 NOTE — PLAN OF CARE
Problem: Adult Inpatient Plan of Care  Goal: Plan of Care Review  Description: The Plan of Care Review/Shift note should be completed every shift.  The Outcome Evaluation is a brief statement about your assessment that the patient is improving, declining, or no change.  This information will be displayed automatically on your shift  note.  Outcome: Progressing     Problem: Anxiety Signs/Symptoms  Goal: Optimized Energy Level (Anxiety Signs/Symptoms)  Outcome: Progressing  Goal: Optimized Cognitive Function (Anxiety Signs/Symptoms)  Outcome: Progressing  Goal: Improved Mood Symptoms (Anxiety Signs/Symptoms)  Outcome: Progressing  Intervention: Optimize Emotion and Mood  Recent Flowsheet Documentation  Taken 7/22/2024 2330 by Jorge Hutchison RN  Supportive Measures: active listening utilized  Goal: Improved Sleep (Anxiety Signs/Symptoms)  Outcome: Progressing  Goal: Enhanced Social, Occupational or Functional Skills (Anxiety Signs/Symptoms)  Outcome: Progressing  Intervention: Promote Social, Occupational and Functional Ability  Recent Flowsheet Documentation  Taken 7/22/2024 2330 by Jorge Hutchison RN  Trust Relationship/Rapport: care explained  Goal: Improved Somatic Symptoms (Anxiety Signs/Symptoms)  Outcome: Progressing  Intervention: Minimize Somatic Disturbance  Recent Flowsheet Documentation  Taken 7/22/2024 2330 by Jorge Hutchison RN  Complementary Therapy: (TV on) other (see comments)   Goal Outcome Evaluation:       Pt alert and oriented x 4.. Complains of abdominal pain 6/10. Tylenol PRN given with good result.  On vent during night with O2 via nasal canula.  Adequate intake and output.  On tube feeding  30 ml/hr. .  Skin looks at base line... Uses call light appropriately.  Mood pleasant and compliant for medication and cares. Handling hospitalization well.  Continue to monitor.    BPA  was firing this morning. Lactic acid level was checked and with in normal limit. Checked vs X 2.  VSS.    Jorge Hutchison RN

## 2024-07-23 NOTE — PLAN OF CARE
Problem: Adult Inpatient Plan of Care  Goal: Absence of Hospital-Acquired Illness or Injury  Intervention: Prevent Skin Injury  Recent Flowsheet Documentation  Taken 7/23/2024 1020 by Tayla Stanford RN  Skin Protection: incontinence pads utilized  Device Skin Pressure Protection: absorbent pad utilized/changed     Problem: Mechanical Ventilation Invasive  Goal: Effective Communication  Intervention: Ensure Effective Communication  Recent Flowsheet Documentation  Taken 7/23/2024 1020 by Tayla Stanford RN  Communication Enhancement Strategies: call light answered in person  Family/Support System Care: presence promoted  Trust Relationship/Rapport:   care explained   choices provided   questions answered   reassurance provided  Goal: Optimal Device Function  Outcome: Progressing  Intervention: Optimize Device Care and Function  Recent Flowsheet Documentation  Taken 7/23/2024 1020 by Tayla Stanford RN  Airway Safety Measures: all equipment/monitors on and audible  Airway/Ventilation Management: airway patency maintained  Goal: Mechanical Ventilation Liberation  Intervention: Promote Extubation and Mechanical Ventilation Liberation  Recent Flowsheet Documentation  Taken 7/23/2024 1020 by Tayla Stanford RN  Medication Review/Management: medications reviewed  Environmental Support:   calm environment promoted   comfort object encouraged   rest periods encouraged  Goal: Absence of Device-Related Skin and Tissue Injury  Intervention: Maintain Skin and Tissue Health  Recent Flowsheet Documentation  Taken 7/23/2024 1020 by Tayla Stanford RN  Device Skin Pressure Protection: absorbent pad utilized/changed     Problem: Anxiety Signs/Symptoms  Goal: Optimized Energy Level (Anxiety Signs/Symptoms)  Outcome: Progressing  At the beginning of the shift, pt very anxious, requested anxiety medications and c/o abdominal discomfort. Ativan 1 mg given for anxiety  and tylenol 650 mg for pain with effect. Pt only  tolerating vent weaning for 12 minits. Pt's spouse and daughter visiting at bed side, very supportive of pt. Palliative nurse visiting with pt and family at present, will continue monitoring.

## 2024-07-23 NOTE — PLAN OF CARE
Problem: Mechanical Ventilation Invasive  Goal: Optimal Device Function  Outcome: Progressing  Goal: Mechanical Ventilation Liberation  Outcome: Progressing  Goal: Absence of Device-Related Skin and Tissue Injury  Outcome: Progressing  Goal: Absence of Ventilator-Induced Lung Injury  Outcome: Progressing   Goal Outcome Evaluation:       RT PROGRESS NOTE     DATA:     CURRENT SETTINGS: AC 18, 360, +5, 25%             TRACH TYPE / SIZE:#6 SHILEY TaperGuard Placed on 7/8/24             MODE:AC             FIO2:25%     ACTION:             THERAPIES:                SUCTION:                           FREQUENCY: X4                          AMOUNT: Small to Moderate                        CONSISTENCY: thick/thin                         COLOR:white                SPONTANEOUS COUGH EFFORT/STRENGTH OF EFFORT (not elicited by suctioning): fair                              WEANING PHASE: 1&2                        WEAN MODE: CPAP/PS and TM/PMV                        WEAN TIME: 12 MINUTES on PS and 15 minutes on TM/PMV                        END WEAN REASON: Increased anxiety ,C/O shortness of breathing, patient requested to stop weaning      RESPONSE:             BS:  Coarse,Rhonchi             VITAL SIGNS:Sat %, HR 82-90, RR 22-29             EMOTIONAL NEEDS / CONCERNS: Anxiety, Anxious                 RISK FOR SELF DECANNULATION: no                        RISK DUE TO: no                        INTERVENTION/S IN PLACE IS/ARE:         NOTE / PLAN: Cont. Current plan of care/ Patient did not tolerate weaning due to having high anxiety

## 2024-07-24 NOTE — PROGRESS NOTES
HEMODIALYSIS TREATMENT NOTE    Date: 7/24/2024  Time: 1330     Data:  Pre Wt:   52.4 kg (bed scale)  Desired Wt:   To be established  Post Wt:  51 kg (bed scale)  Weight change: - 1.5 kg  Ultrafiltration - Post Run Net Total Removed (mL):  1500 ml  Vascular Access Status: Fistula patent  Dialyzer Rinse:  Light  Total Blood Volume Processed: 89.8 L   Total Dialysis (Treatment) Time:   3.5 Hrs  Dialysate Bath: K 4, Ca 3  Heparin: Heparin: None     Lab:   No    Interventions:  Dialysis done through left AV Fistula using 15 gauge needles. ,   UF set to 1.8 Liters, accommodating priming and rinse back volumes  Meds administered per MAR  See Flowsheet for Crit Profile throughout the run. Treatment has ended safely and  blood is rinsed back completely  Decannulation done post HD, hemostasis is achieved in 10 minuts  Pressure dressing is applied, to be removed after 4-6 hours  Post Tx assessment done. Patient remain room in stable condition  Report given to SARAH RN     Assessment:  A/O x 4, calm and cooperative, denies pain  Lung sounds diminished anterior   left arm AV Fistula has good thrill and bruit                Plan:    Per Renal team

## 2024-07-24 NOTE — CONSULTS
Care Management Initial Consult    General Information    Brief History:  Kecia Blue is a 63 year old female with PMH ILD s/p bilateral lung transplant (2018) c/b recurrent right bronchial stenosis s/p repeat balloon dilation/stenting, repeat opportunistic pneumonia (PJP 2021, aspergillus/stenotrophomonas 11/2023) and viremias (EBV, CMV), and 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 Hospital course complicated by hypotension, delirium, and prolonged respiratory failure.  She has a tracheostomy placement on 7/3 and PEG-J tube placement with IR on 7/5.   Assessment completed with: Patient, Spouse or significant other, , Ranjan, and dtr.       Primary Care Provider verified and updated as needed:     Readmission within the last 30 days: other (see comments)      Reason for Consult: discharge planning  Advance Care Planning: Advance Care Planning Reviewed: palliative care discussed          Communication Assessment  Patient's communication style: spoken language (English or Bilingual)    Hearing Difficulty or Deaf: yes   Wear Glasses or Blind: yes    Cognitive  Cognitive/Neuro/Behavioral: WDL  Level of Consciousness: alert  Arousal Level: opens eyes spontaneously  Orientation: oriented x 4  Mood/Behavior: anxious, cooperative  Best Language:  (AMBROSIO)  Speech: trached    Living Environment:   People in home: child(mayito), adult, spouse     Current living Arrangements: house      Able to return to prior arrangements: yes (after  LTACH)       Family/Social Support:  Care provided by: spouse/significant other, self, child(mayito)  Provides care for: child(mayito)  Marital Status:   , Children          Description of Support System: Supportive, Involved         Current Resources:   Patient receiving home care services: No     Community Resources:  DME  Equipment currently used at home: lift device  Supplies currently used at home: Oxygen Tubing/Supplies, Nebulizer tubing    Employment/Financial:  Employment Status: disabled now, but 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     Financial Concerns: none           Does the patient's insurance plan have a 3 day qualifying hospital stay waiver?  No    Lifestyle & Psychosocial Needs:  Social Determinants of Health     Food Insecurity: No Food Insecurity (9/20/2022)    [facility]    Hunger Vital Sign     Worried About Running Out of Food in the Last Year: Never true     Ran Out of Food in the Last Year: Never true   Depression: Not at risk (3/13/2024)    PHQ-2     PHQ-2 Score: 0   Housing Stability: Unknown (9/20/2022)    [facility]    Housing Stability     In the last 12 months, was there a time when you were not able to pay the mortgage or rent on time?: No     Number of Places Lived in the Last Year: Not on file     Number of Places Lived in the Last Year (Outpatient): Not on file     In the last 12 months, was there a time when you did not have a steady place to sleep or slept in a shelter (including now)?: No   Tobacco Use: Low Risk  (5/16/2024)    Patient History     Smoking Tobacco Use: Never     Smokeless Tobacco Use: Never     Passive Exposure: Not on file   Financial Resource Strain: Low Risk  (9/20/2022)    [facility]    Overall Financial Resource Strain (CARDIA)     Difficulty of Paying Living Expenses: Not hard at all   Alcohol Use: Not At Risk (9/20/2022)    [facility]    AUDIT-C     Frequency of Alcohol Consumption: Monthly or less     Average Number of Drinks: 1 or 2     Frequency of Binge Drinking: Never    Transportation Needs: No Transportation Needs (9/20/2022)    [facility]    PRAPARE - Transportation     Lack of Transportation (Medical): No     Lack of Transportation (Non-Medical): No   Physical Activity: Inactive (9/20/2022)    [facility]    Exercise Vital Sign     Days of Exercise per Week: 0 days     Minutes of Exercise per Session: 0 min   Interpersonal Safety: Not At Risk (4/15/2024)    Received from [facility]    Intimate Partner Violence     Are you in a relationship where you are physically hurt, threatened and/or made to feel afraid?: No   Stress: No Stress Concern Present (9/20/2022)    [facility]    [employer] [place] [employer] - Occupational Stress Questionnaire     Feeling of Stress : Not at all   Social Connections: Moderately Integrated (9/20/2022)    Received from [facility], [facility]    Social Connection and Isolation Panel [NHANES]     Frequency of Communication with Friends and Family: Twice a week     Frequency of Social Gatherings with Friends and Family: Twice a week     Attends Scientology Services: More than 4 times per year     Active Member of Clubs or Organizations: No     Attends Club or Organization Meetings: Not on file     Marital Status:    Health Literacy: Not on file       Functional Status:  Prior to admission patient needed assistance:   Dependent ADLs:: Independent, Eating, Grooming, Dressing, Bathing, Ambulation-walker, Toileting  Dependent IADLs:: Cleaning, Cooking, Shopping, Transportation, Money Management       Mental Health Status:      Chemical Dependency Status:                Values/Beliefs:  Spiritual, Cultural Beliefs, Scientology Practices, Values that affect care:                  Additional Information:  Met with patient,  - Ranjan, and daughter Nasreen.  3 grand kids were present today, too. Per , Kecia was on speaking valve for about an hour.  She is tired now.  Discussed role of CM and SW and told patient and family that we will be following her along with the rest of the team.  Did discuss that patient has had palliative care visit and another visit is scheduled for next Tues, 7/30/24.  Did give  the contact information for LUANA and RN CC.  Did visit a bit with patient and family - and did express to them that we know this is a difficult time as they are considering end of life issues, and just wanted to offer the support to them.  Patient would like to be at home, if at all possible, but they realize this may not be possible.  Will continue to follow  patient during her LTACH admission.    Carlotta Crowe, RN, BSN  Care Coordination  [facility]  [phone]

## 2024-07-24 NOTE — PROGRESS NOTES
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 Hospital course complicated by hypotension, delirium, and prolonged respiratory failure.  She has a tracheostomy placement on 7/3 and PEG-J tube placement with IR on 7/5.      Patient arrived on LTACH unit on 7/18 in severe respiratory distress. She complained of nausea, chest pressure, and dsypnea. She is tachypneic and breathing at 41 breaths per minute. She is tripoding and pulse ox reading in low 80's, FiO2 on vent was increased to 50%. She has severe rhonchi and rales in all lung fields, worst in RUL. She was suctioned twice and minimal secretions noted and was given xopenex. Her rhochi and rales of L lung field improved, but not the RUL. She is having severe anxiety and was given 0.5 mg of ativan IVP. She is complaining of chest pressure and tachycardic to 133. EKG done and revealed an incomplete right bundle branch block, no ST-T wave changes in contiguous leads. BP is 215/91- given 10mg of IV hydralazine, BP decreased to 185/99. Spoke with Dr. Sanches and he accepted pt back to [facility].  On 7/19, pt has improved and vitals are now stable and has been transferred back to [place].    LTACH course:  7/20-7/22: +anxiety, has seroquel as first line and ativan as second line prn.  Not sleeping well and not tolerating weaning well.  Added lantus 5 unit(s) for hyperglycemia.  Increased seroquel to 25 q8h  7/23-7/28:  Met with palliative medicine per  limited ventilator weaning. Code status updated to DNR/I OK; requesting improved anxiety control and aware this may continue to limit ventilator weaning attempts. Pancytopenia withOUT absolute neutropenia.    Assessment & Plan      #Acute on chronic hypoxic hypercapnic respiratory failure, improving  #Concern for possible immune vs inflammatory process, improving  #HAP, Stenotrophomonas maltophilia, Enterococcus faecalis, and Candida guilliermondi complex  #Severe pulmonary edema  #Acute respiratory distress syndrome, resolved  Presented to the ED on 6/18/2024 with acute on chronic hypoxic hypercapnic respiratory failure. Transferred to MICU and intubated on overnight on 6/18. Workup significant for Stenotrophomonas, Enterococcus, and Candida pneumonia. Course was c/b progression to ARDS (6/21-22). Tracheostomy placed on 7/3 due to inability to wean from ventilator.  - Pulmonary toilet  - Mucomyst BID  - Hypertonic saline BID, alternate with mucomyst  - Albuterol neb QID  - Antibiotics as below  -pulmonology following for ventilator weaning (phase 2)         #Recurrent right middle bronchus stenosis s/p dilation and stent (6/20/2024), stable  Has history (bronchoscopy 2/15/24) demonstrating narrowing of right mainstem transplant anastomosis and bronchus intermedius. CT chest (6/18/2024) and bronchoscopy (6/19/2024) demonstrated occlusion of right middle bronchus. With concern for post-obstructive pneumonia, interventional pulmonology was consulted, and completed bronchoscopy (6/20/2024) with tissue debulking, right middle bronchus balloon dilation to 11 mm, stent placement in right main bronchus, and bifurcating cast placement in right upper bronchus. IP pulm re-evaluated bronchial stents with BAL and noted that they were open.    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   - Prospera (7/3) 2.26  - DSA (7/3) negative  - PTA nebs  - Fluticasone-viilanterol (breo ellipta) every day - HOLD with respiratory infection  - Montelukast every day   - Immunosuppressants per Tx Pulm:   -Tacrolimus 1mg BID  - Prednisone 40mg PO every day (baseline chronic 5mg Daily)  -azathioprine - HOLD per Transplant pulmonology with repeat infections  - Peripheral smear (7/9) pending for pancytopenia  - Antibiotic prophylaxis               - Bactrim MWF for PJP ppx (monitor need to adjust pending HD plan)  - CMV weekly monitoring (qTuesday)  - Azithromycin 125mg per pulmonary, continue to monitor QTc  - Continue VGCV ppx (renally dosed, monitor need to adjust pending HD plan) with tentative plan for 3 weeks beyond completion of stress dose steroids   - Steroid Taper per Transplant Pulmonology as below:  Date Daily dose (mg)   7/11 50   7/25 45   8/8 40   8/22 35   9/5 30   9/19 25   10/17 20   11/14 15   12/12 10   1/9 5      - Hold PTA prednisone dose while completing steroid taper as above     #HAP, Stenotrophomonas maltophilia, Enterococcus faecalis, Aspergillus, and Candida guilliermondi complex  Presented to the ED on 6/18/2024 with SOB and hypercapnic respiratory failure. Found on bronchoscopy (6/19) to have RML obstruction by narrowing bronchus intermedius as well as copious mucopurulent secretions/mucus plugging. Previously treated for Stenotrophonomas/aspergillus pneumonia in 11/2023 with subsequent sputum culture (2/2024) negative for both Stenotrophonomas/Aspergillus. Etiology likely repeat Stenotrophomonas infection vs opportunistic infection from colonized microbe.   - Workup  - 6/18 sputum cx: Stenotrophomonas maltophilia (ceftazidime and levofloxacin R)  - 6/19 BAL cx: Stenotrophomonas maltophilia, Enterococcus faecalis (gentamicin R),  Aspergillus (speciation in process)  - 6/21 BAL cx: Stenotrophomonas maltophilia and Enterococcus faecalis VRE  - 6/24 sputum cx: Stenotrophomonas maltophilia, Enterococcus faecalis VRE, and Candida guilliermondi complex  - Transplant ID recommendations prior to LTACH transfer:               - Continue posaconazole 300 mg/day   - Continue VGCV prophylaxis  - Weekly CMV VL (Tuesdays, next 7/9), last was 39 (7/2)         - Azithromycin per pulmonary (holding due to QTC, but will follow up)  - TMP/SMX SS daily for PJP PPx (for life given h/o PJP)  - Awaiting susceptibilities on Aspergillus per transplant ID  - Present antibiotics               - Continue posaconazole 300 mg/day per pulm transplant (6/25 to present) for candida guillermondii and asergillus ochraceus on prior BAL  - BAL fluid (7/3): pink, hazy, cell counts normal range, fluid sent for viral, bacterial and fungal cultures negative to date, cytology negative for malignancy and organisms; preliminary AFB negative     #Hx Aspergillus PNA (11/2023)  #Hx PJP PNA (1/2021)  #Hx CMV viremia, recurrent  #Hx EBV viremia  - Transplant ID consult, appreciate recs  PTA posaconazole (Treatment for hx of aspergillus PNA)  PTA azithromycin 250mg qd (CLAD ppx) (holding)  PTA bactrim (PJP ppx)  -CMV PCR 7/16 positive but <35     #ESRD on iHD (T,Th,Sat)  Hypokalemia  History of ESRD 2/2 Tacrolimus toxicity on HD (MWF). With soft blood pressures, placed RIJ (6/20/2024) and started CRRT (6/20/2024). US of AVF 7/5: arterial inflow patent without inflow stenosis, normal diameter of anastamosis, venous outflow elevated velocity at subclavian- suggestive of recurrent stenosis in venous outflow (area of prior angioplasty in March). IR consulted who noted that IR fistulogram not required at that time, and was able to run iHD on beginning on 7/6 without issue.   - Nephrology consulted and following for ESRD, patient tolerating 3x weekly HD at time of discharge  - Renally-dosed  medications  - BMP daily  - HD T/ Th/ Sa schedule now  -replete electrolytes as needed      #Anxiety  Insomnia   Patient has been having anxiety regarding pressure support trials and weaning from the ventilator. Health Psychology consulted and followed patient weekly while hospitalized.   -seroqeul 25mg PO TID  -ramelteon 8 mg at bedtime  -trazadone 50mg at bedtime   -ativan 1mg PO q4h\prn   -seroquel 25 mg q6h\prn      #Toxic metabolic encephalopathy, resolved  Patient noted to have lethargy in setting of sepsis and delirium in the setting of high-dose steroid therapy which improved with management of underlying sepsis. Patient mental status returned to baseline at day of discharge.   - Quetiapine to 25 mg at bedtime   - Ramalteon 8mg PO at bedtime  - Trazodone 50mg for sleep  - Delirium precautions  - Do not disturb order overnight to promote sleep     #Septic shock, resolved  On 6/19/2024, developed sepsis in the setting of stenotrophomonas pneumonia/enterococcus faecium VRE UTI. Resolved.     #Demand Ischemia, resolved    Presented with elevated troponin (peak 499). Not associated with chest pain or hypoxia. EKG without ST elevations/depressions or T wave inversions. Suspect demand ischemia in the setting of sepsis. Will continue to monitor symptoms and continuous telemetry. EKG 7/2 sinus rhythm with PACs.     #Paroxysmal A-Fib, improved  #Pulmonary Hypertension   #Transient arhythmia with palpitations  History of pulmonary hypertension (45 mmHg, echo 10/2023) in the setting of ILD and paroxysmal afib on PTA metoprolol tartrate BID. Not on anticoagulation for afib. Transient unspecified arhythmia overnight (7/9) with palpitations. EKG 7/9 sinus rhythm with fusion complexes and known RBBB. Repeat EKG 7/10 showing sinus rhythm with no RBBB or fusion complexes. Patient given magnesium 1g and 20mEq K+ for K 3.3 this am. No further episodes of palpitations. Currently in sinus rhythm.  - continue PTA metoprolol  tartrate 25mg BID  - CTM     #Prolonged Qtc, resolved  Repeat EKG 7/13 with QTC of 483.   - Azithromycin ppx restarted per pulm transplant  -Qtc 476 (7/17)  -Daily EKG        Severe protein calorie deficiency  PEG-J placed by IR on 7/5, continue tube feeds.   - Nutrition consulted  - Tube feeds 30ml/hr; at goal     #Diarrhea, resolved  On 6/21, had 8 bowel movements. Occurred in the setting of scheduled bowel regimen and starting new antibiotics (meropenem, levofloxacin). C diff negative.   - Holding PRN bowel regimen for loose stools  - Miralax PRN   - Senna prn  - formula changes made per nutrition      #Cholelithiasis with gallbladder wall thickening, resolved  During admission patient found to have up trending LFTs and fever in setting of GB wall thickening on CT abdomen/pelvis, now resolved. MRCP completed showing borderline dilated CBD up to 1.1 cm, no significant intrahepatic biliary dilation, no evidence of choledocholithiasis, no evidence of acute cholecystitis.     #Risk for hyperglycemia with high dose steroids  Stress dose steroids discontinued, resume PTA prednisone 5 mg daily. Blood sugars have remained in appropriate range.  - lantus 5 unit(s) qam  -sliding scale insulin         #Pyuria, Enterococcus faecium VRE and  ESBL E. Coli   Asymptomatic. With progressive IV pressor needs, obtained broad infectious workup which demonstrated UCx (6/19/2024) with Enterococcus faecium VR and ESBL E. Coli. S/p Daptomycin (6/21-6/23) and Meropenem (6/21-6/24).        #Acute on Chronic Normocytic Anemia, stable  #Thrombocytopenia, chronic, improving  History of chronic anemia in the setting of kidney disease (baseline Hgb 10-11). Upon admission, Hgb 9. May be bone marrow suppression in the setting of chronic illness; potential contribution by blood loss with CRRT filter changes (~150-200c). Peripheral smear (6/18/2024) without evidence of schistocytes.      FOLLOW UPS NEEDED  -pulmonology for dedicated bronchoscopy  for stent visualisation (anticipated 8/15)      ANTIBIOTICS COURSES:              - s/p micafungin 150 mg/day (6/25-7/9)  - s/p IV minocycline 100mg BD x 14 days total (6/20-7/5)- for stenotrophamonas  - PO linezolid 600 mg BID x 10 days (6/25-7/5)- for VRE in urine and BAL cultures  - S/p ceftazidime (6/25-6/28)               - S/p vancomycin (6/18-6/20)              - S/p zosyn (6/18-21)  - S/p Daptomycin (6/21-6/23)  - S/p Meropenem (6/21-6/24)  - S/p Levofloxacin (6/21-6/23)        Diet: Adult Formula Drip Feeding: Continuous Novasource Renal; Jejunostomy; Goal Rate: 30; mL/hr; OK to begin at goal rate. Please abide by 8-hour hang-time for food safety. Pour 237 mL (1 carton) of formula into TF pump bag per 8-hour batch.    DVT Prophylaxis: Heparin SQ  Basilio Catheter: Not present  Lines: None     Cardiac Monitoring: None  Code Status: No CPR- Pre-arrest intubation OK      Clinically Significant Risk Factors        # Hypokalemia: Lowest K = 3.3 mmol/L in last 2 days, will replace as needed       # Hypoalbuminemia: Lowest albumin = 2.8 g/dL at 7/22/2024  6:31 AM, will monitor as appropriate                 #Precipitous drop in Hgb/Hct: Lowest Hgb this hospitalization: 7.1 g/dL. Will continue to monitor and treat/transfuse as appropriate.         # Financial/Environmental Concerns:           Disposition Plan     Medically Ready for Discharge: Anticipated in 5+ Days             Casa Chaudhari MD  Hospitalist Service  LTACH  Securely message with [employer] (more info)  Text page via [facility] Paging/Directory   ______________________________________________________________________    Interval History   No overnight events. G tube attempted to clear with zapper, unsuccessful. Discussed with IR team, plans to exchange GJ tube tomorrow. Frequent/recurrent anxiety exacerbated by ventilator weaning trials and therapies.     Physical Exam   Vital Signs: Temp: 98.2  F (36.8  C) Temp src: Oral BP: 117/55 Pulse: 89   Resp: 27  SpO2: 100 % O2 Device: Mechanical Ventilator Oxygen Delivery: 40 LPM  Weight: 115 lbs 8.34 oz    General:  No acute distress, cachectic , temporal muscle wasting  Eyes:  Sclera non icteric, normal conjuctiva  Neck :  Supple, no lymphadenopathy, trach in place   Lungs: Rhonchi noted in all lung fields   CVS:  S1 and S2, regular rate and rhythem  Abdomen:  Soft, nontender, PEG in place   Musculoskeletal:  No pain or swelling.  No edema  Neuro:  Alert and oriented.  No acute deficit     Medical Decision Making       60 MINUTES SPENT BY ME on the date of service doing chart review, history, exam, documentation & further activities per the note.  MANAGEMENT DISCUSSED with the following over the past 24 hours: patient, spouse   NOTE(S)/MEDICAL RECORDS REVIEWED over the past 24 hours: RN notes, consultant notes, RT notes  Tests ORDERED & REVIEWED in the past 24 hours:  - See lab/imaging results included in the data section of the note      Data   Imaging results reviewed over the past 24 hrs:   No results found for this or any previous visit (from the past 24 hour(s)).  Most Recent 3 CBC's:  Recent Labs   Lab Test 07/24/24  0630 07/23/24  0701 07/23/24  0554 07/22/24  0900 07/22/24  0631   WBC 4.2 1.1*  --   --  1.3*   HGB 7.9* 7.1* 7.6*   < > 8.0*   * 109*  --   --  109*    140*  --   --  165    < > = values in this interval not displayed.     Most Recent 3 BMP's:  Recent Labs   Lab Test 07/24/24  0601 07/24/24  0003 07/23/24  1734 07/23/24  0617 07/23/24  0554 07/22/24  1244 07/22/24  0900 07/22/24  0631 07/20/24  1209 07/20/24  0650 07/19/24  1125 07/19/24  0728   NA  --   --   --   --  138  --  135 134*   < > 133*  --  133*   POTASSIUM  --   --   --   --  3.3*  --  3.6 3.8   < > 4.4  --  3.8   CHLORIDE  --   --   --   --   --   --   --  96*  --  93*  --  96*   CO2  --   --   --   --   --   --   --  28  --  27  --  28   BUN  --   --   --   --   --   --   --  54.1*  --  61.6*  --  35.0*   CR  --   --   --    --   --   --   --  2.11*  --  2.49*  --  1.80*   ANIONGAP  --   --   --   --   --   --   --  10  --  13  --  9   CHELSEY  --   --   --   --   --   --   --  8.5*  --  8.6*  --  8.6*   * 231* 206*   < > 156*   < > 176* 199*   < > 200*   < > 90    < > = values in this interval not displayed.     Most Recent 2 LFT's:  Recent Labs   Lab Test 07/22/24  0631 07/20/24  0650   AST 25 41   ALT 38 61*   ALKPHOS 287* 474*   BILITOTAL 0.5 0.6     Most Recent 3 INR's:  Recent Labs   Lab Test 06/18/24  1418 03/05/24  0925 02/14/24  1050   INR 0.98 0.98 0.96

## 2024-07-24 NOTE — PLAN OF CARE
Problem: Adult Inpatient Plan of Care  Goal: Absence of Hospital-Acquired Illness or Injury  7/23/2024 2221 by Homero Sparks RN  Outcome: Not Progressing     Problem: Anxiety Signs/Symptoms  Goal: Improved Mood Symptoms (Anxiety Signs/Symptoms)  7/23/2024 2221 by Homero Sparks RN  Outcome: Not Progressing  7/23/2024 2219 by Homero Sparks RN  Outcome: Not Progressing  Intervention: Optimize Emotion and Mood  Recent Flowsheet Documentation  Taken 7/23/2024 1800 by Homero Sparks RN  Supportive Measures: decision-making supported     Problem: Enteral Nutrition  Goal: Safe, Effective Therapy Delivery  7/23/2024 2221 by Homero Sparks RN  Outcome: Not Progressing  7/23/2024 2219 by Homero Sparks RN  Outcome: Not Progressing  7/23/2024 2219 by Homero Sparks RN  Outcome: Not Progressing    Pt is alert and oriented x4. Pt developed a new wound on her left foot on the second toe. Pt's daughter requested a WOC consult. Pt's G and J tube were clogged at the beginning of shift (1500). J tube was able to be unclogged, G tube is currently clogged. Orders have changed to accommodate the clogging of the G tube. Pt's anxiety levels were high this afternoon, RN was unable to give PRN Ativan due to clogged G tube. Order for one-time IV Ativan was obtained. Client has been resting peacefully since.

## 2024-07-24 NOTE — PROGRESS NOTES
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    Has been on hemodialysis since 2019.    Historically ran at [facility] under cares of Dr Glasgow  Has left upper extremity AVG with good function.  TT 3.5 hours  Back on her historical MWF schedule of dialysis  Lidocaine added to HD orders for her fistula   Her arterial and venous access are reversed on her arm     Volume:  Wts stable ~ 49kg     Hyponatremia: Mild, borderline  UF with HD.     ACD:   PTA Mircera.    Dosing EPO while inpatient.    Hemoglobin uptrending on low-dose EPO 4000 units 3 times weekly     ESRD MBD:   Mild hyperphosphatemia does not appear to be on binders.    Consider binders if phosphorus persistently >5.5    Mild hypocalcemia when corrected for hypoalbuminemia.    Vitamin D level 37 7/22  PTH 91 7/23    HTN  On monotherapy with Coreg.  BP acceptable, 120s - 140s    Respiratory Failure  Followed by pulmonary, on ventilator    Hospital Acquired Pneumo    S/p bilat lung transplant 2018  IS: Tacrolimus, azathioprine, prednisone  Prophylaxis: Bactrim, azithromycin, valgancyclovir. Renally dosed.     Anxiety     Pain    Protein/calorie deficiency   PEG-J placed    Per Palliative note 7/23 - plans for Tues 7/30 for family to meet with palliative to discuss goals of care. Pt wishes for family to \"all be on the same page\"    SUBJECTIVE:    Seen in room  Laying in bed with trach vent on   Unable to speak, but mouths words and uses pen and clip boar to communicate  HD Nurse Geno present, running HD.   Pt ding well, no NVD, endorse some dizziness  No big issues reported  Pt mentions to me she would like to have her pain medicines administered IV.     OBJECTIVE:  Physical Exam   Temp: 98.2  F (36.8  C) Temp src: Oral BP: 119/59 Pulse: 81   Resp: 24 SpO2: 99 % O2 Device: Mechanical Ventilator Oxygen Delivery: 40 LPM  Vitals:    07/21/24 0423 07/22/24 0639 07/24/24 0700   Weight: 49.2 kg " (108 lb 7.5 oz) 49.1 kg (108 lb 3.9 oz) 52.4 kg (115 lb 8.3 oz)     Vital Signs with Ranges  Temp:  [97.9  F (36.6  C)-98.6  F (37  C)] 98.2  F (36.8  C)  Pulse:  [72-90] 81  Resp:  [22-28] 24  BP: (119-139)/(59-68) 119/59  FiO2 (%):  [25 %-40 %] 25 %  SpO2:  [95 %-100 %] 99 %  I/O last 3 completed shifts:  In: 869 [NG/GT:869]  Out: -         Patient Vitals for the past 72 hrs:   Weight   07/24/24 0700 52.4 kg (115 lb 8.3 oz)   07/22/24 0639 49.1 kg (108 lb 3.9 oz)   [unfilled]    PHYSICAL EXAM:  General: A&Ox3, NAD  HEENT:  Pupils equal, round, no scleral icterus  NECK:  no carotid bruits, no JVD  RESPIRATORY:  Trach in place, mouth words  CARDIOVASCULAR:  RRR, no murmur  VOLUME: mild edema lower legs   ABDOMEN:  soft, BS present, non-tender, non-distended  GENITOURINARY:  No rivas   INTEGUMENTARY: Warm, dry, no rash  NEUROLOGIC: grossly intact   Psych: calm, cooperative  ACCESS: Upper Left AVF, arterial/venous reversed on her access    LABORATORY STUDIES:     Recent Labs   Lab 07/23/24  0701 07/23/24  0554 07/22/24  0900 07/22/24  0631 07/21/24  0626 07/20/24  0650 07/19/24  0728 07/18/24  1549 07/18/24  0657   WBC 1.1*  --   --  1.3*  --  5.1 2.6*  --  2.5*   RBC 2.05*  --   --  2.35*  --  2.82* 2.32*  --  2.36*   HGB 7.1* 7.6* 8.7* 8.0* 8.8* 9.7* 7.9*   < > 8.1*   HCT 22.4*  --   --  25.5*  --  31.2* 24.3*  --  25.2*   *  --   --  165  --  216 157  --  152    < > = values in this interval not displayed.       Basic Metabolic Panel:  Recent Labs   Lab 07/24/24  0601 07/24/24  0003 07/23/24  1734 07/23/24  1230 07/23/24  0617 07/23/24  0554 07/22/24  1244 07/22/24  0900 07/22/24  0631 07/21/24  1257 07/21/24  0626 07/20/24  1209 07/20/24  0650 07/19/24  1125 07/19/24  0728 07/18/24  0831 07/18/24  0657   NA  --   --   --   --   --  138  --  135 134*  --  137  --  133*  --  133*   < > 132*   POTASSIUM  --   --   --   --   --  3.3*  --  3.6 3.8  --  3.6  --  4.4  --  3.8   < > 3.4   CHLORIDE  --   --   --    --   --   --   --   --  96*  --   --   --  93*  --  96*  --  92*   CO2  --   --   --   --   --   --   --   --  28  --   --   --  27  --  28  --  29   BUN  --   --   --   --   --   --   --   --  54.1*  --   --   --  61.6*  --  35.0*  --  63.3*   CR  --   --   --   --   --   --   --   --  2.11*  --   --   --  2.49*  --  1.80*  --  2.42*   * 231* 206* 266* 170* 156*   < > 176* 199*   < > 145*   < > 200*   < > 90   < > 128*   CHELSEY  --   --   --   --   --   --   --   --  8.5*  --   --   --  8.6*  --  8.6*  --  8.1*    < > = values in this interval not displayed.       INRNo lab results found in last 7 days.     Recent Labs   Lab Test 07/23/24  0701 07/23/24  0554 07/22/24  0900 07/22/24  0631 06/18/24  1803 06/18/24  1418 03/05/24  0925   INR  --   --   --   --   --  0.98 0.98   WBC 1.1*  --   --  1.3*   < > 4.5 1.0*   HGB 7.1* 7.6*   < > 8.0*   < > 12.1 10.5*   *  --   --  165   < > 147* 96*    < > = values in this interval not displayed.       Personally reviewed current labs       Shayla Mark PA-C  [facility]  [phone]

## 2024-07-24 NOTE — PLAN OF CARE
Problem: Mechanical Ventilation Invasive  Goal: Effective Communication  Outcome: Adequate for Care Transition   Goal Outcome Evaluation:       RT PROGRESS NOTE     DATA:     CURRENT SETTINGS: Vent Mode: CMV/AC  (Continuous Mandatory Ventilation/ Assist Control)  FiO2 (%): 25 %  Resp Rate (Set): 18 breaths/min  Tidal Volume (Set, mL): 360 mL  PEEP (cm H2O): 5 cmH2O  Pressure Support (cm H2O): 15 cmH2O  Resp: 22                 TRACH TYPE / SIZE:  #6 Shiley placed in 7/8             MODE:   AC             FIO2:   25%     ACTION:             THERAPIES:   Xopen QID/Mucomyst BID and Sodium BID             SUCTION:                           FREQUENCY:   X 2                        AMOUNT:   small to moderate                        CONSISTENCY:   thick                        COLOR:  white yellow             SPONTANEOUS COUGH EFFORT/STRENGTH OF EFFORT (not elicited by suctioning): yes                              WEANING PHASE: 1, days                         WEAN MODE:                            WEAN TIME:                           END WEAN REASON:        RESPONSE:             BS:   clear and diminished             VITAL SIGNS:   Blood pressure 126/59, pulse 73, temperature 97.9  F (36.6  C), temperature source Axillary, resp. rate 22, weight 49.1 kg (108 lb 3.9 oz), last menstrual period 06/07/2014, SpO2 96%, not currently breastfeeding.               EMOTIONAL NEEDS / CONCERNS:  yes                RISK FOR SELF DECANNULATION:  yes                        RISK DUE TO:  anxiety and agitation                        INTERVENTION/S IN PLACE IS/ARE:  no       NOTE / PLAN:  Pt. Is stable on full vent. Will continue monitoring.   07/19/24 6060  Ventilator weaning phase 1  UNTIL DISCONTINUED        Order Class: Hospital Performed

## 2024-07-24 NOTE — PLAN OF CARE
Problem: Mechanical Ventilation Invasive  Goal: Optimal Device Function  Outcome: Progressing  Intervention: Optimize Device Care and Function  Recent Flowsheet Documentation  Taken 7/24/2024 0833 by Makenzie Mathews, RT  Airway Safety Measures: all equipment/monitors on and audible  Goal: Mechanical Ventilation Liberation  Outcome: Progressing  Goal: Absence of Device-Related Skin and Tissue Injury  Outcome: Progressing  Goal: Absence of Ventilator-Induced Lung Injury  Outcome: Progressing   Goal Outcome Evaluation:  RT PROGRESS NOTE     DATA:     CURRENT SETTINGS:18, 320, 5             TRACH TYPE / SIZE:  #6 Shiley TG changed on 7/8/24             MODE:   CMV             FIO2:   30%     ACTION:             THERAPIES:   Xopenex Qid, mucomyst bid, nebusal bid             SUCTION:                           FREQUENCY:   x4                        AMOUNT:   sm                        CONSISTENCY: thick                        COLOR:WHITE             SPONTANEOUS COUGH EFFORT/STRENGTH OF EFFORT (not elicited by suctioning): fair productive     WEANING PHASE: 3                        WEAN MODE:   Trach capped/nc 3lpm                         WEAN TIME:   1hr 15 min                        END WEAN REASON:  per order                             RESPONSE:             BS:   coarse             VITAL SIGNS:   SPO2 100%, HR 88-91, RR 24-33             EMOTIONAL NEEDS / CONCERNS:  Anxious                RISK FOR SELF DECANNULATION:  No                               NOTE / PLAN:     Pt leland capping well, even at the end of wean pt's VS were stable. Ativan helps before weaning.   Pt is going tomorrow to [facility] for IR.  Cont with current poc

## 2024-07-24 NOTE — PLAN OF CARE
Problem: Adult Inpatient Plan of Care  Goal: Optimal Comfort and Wellbeing  Outcome: Progressing  Intervention: Provide Person-Centered Care  Recent Flowsheet Documentation  Taken 7/24/2024 0200 by Rogelio Guerra RN  Trust Relationship/Rapport:   care explained   choices provided     Problem: Anxiety Signs/Symptoms  Goal: Optimized Energy Level (Anxiety Signs/Symptoms)  Outcome: Progressing  Goal: Improved Mood Symptoms (Anxiety Signs/Symptoms)  Intervention: Optimize Emotion and Mood  Recent Flowsheet Documentation  Taken 7/24/2024 0200 by Rogelio Guerra RN  Supportive Measures: active listening utilized  Goal: Enhanced Social, Occupational or Functional Skills (Anxiety Signs/Symptoms)  Intervention: Promote Social, Occupational and Functional Ability  Recent Flowsheet Documentation  Taken 7/24/2024 0200 by Rogelio Guerra RN  Trust Relationship/Rapport:   care explained   choices provided   Goal Outcome Evaluation:         Pt had an uneventful night, slept most of the shift, on  trach  vent, no c/o of anxiousness, cooperative with cares. Vitals is stable, blood sugar checked with coverage. Continuous TF  running and tolerated it well. Continue monitoring. /59 (BP Location: Right arm)   Pulse 73   Temp 97.9  F (36.6  C) (Axillary)   Resp 22   Wt 49.1 kg (108 lb 3.9 oz)   LMP 06/07/2014 (Exact Date)   SpO2 96%   BMI 19.17 kg/m           Latest Reference Range & Units 07/24/24 00:03 07/24/24 06:01   GLUCOSE BY METER POCT 70 - 99 mg/dL 231 (H) 190 (H)   (H): Data is abnormally high

## 2024-07-24 NOTE — PROGRESS NOTES
Pulmonary Progress Note    Admit Date: 7/19/2024  CODE: No CPR- Pre-arrest intubation OK    Assessment/Plan:     Problems:  Acute on chronic hypoxic and hypercapnic respiratory failure suspect d/t post obstructive multi-lobar pneumonia 2/2 right bronchus stenosis s/p stent RMB to BI & bifurcating stent in RUL. Completed antibiotics. S/p trach on prolonged mechanical ventilation   6 Shiley tracheostomy placed 7/3/24: Hx trach in 2021 s/p decann  Oropharyngeal dysphagia status post PEG tube on tube feeds: Passed BDT here   Possible  on steroid taper  S/p BSLT for ILD 2018 c/b CLAD, possible ACR  - DSA neg 7/3  Immunosuppression: Tacro goal level 8-10. 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 EBV 1680 and log 3.2 on 7/22  ESRD on iHD  Anxiety with panic attacks limiting weaning potential: Ativan & Seroquel PRN  Pancytopenia(worsening 7/22): Med related? On VGCV as above. Accelerated prednisone taper. Gave Neupagen x1 7/23 with improvement. CMV still <35 w log 1.5 so continuing VGCV.     Plan/Recommendations:  Repeat EBV PCR in a week  CBC & diff daily for now   Low threshold for blood cultures if showing signs of sepsis  Start phase 3 vent wean pathway: Cap 30-60min max BID.  Otherwise may attempt PS during the day and continue AC at night. Capping today on 3L via NC with SpO2 100%.   CxR weekly, next on Monday   Steroid taper: Currenlty prednisone 40mg daily(decrease by 5mg q2 weeks for now)  Nebs: levalbuterol QID, Mucomyst BID alternating with HTS for stent   PTA Singulair   Azithromycin 125 mg daily (resumed 7/18 at lower dose, prior held 7/10 for prolonged Qtc; Qtc 428 on 7/23)  Start Escitalopram 10mg daily for ELOISA  Tacro levels qMTh - pharmacy and transplant to adjust dose accordingly. 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 The patient was admitted on 6/18/24 for progressive hypoxia with dyspnea and worsening hypercapnia in ED requiring intubation likely d/t post-obstructive PNA 2/2 right bronchus stenosis. S/p IP bronch (6/20) with laser tissue debulking RMB stenosis, airway balloon dilation of RMB and BI, and placement of stent RMB to BI and bifurcating stent in RUL. S/p CRRT (6/20-7/2), iHD resumed 7/4. Recurrence of paroxysmal afib with RVR first noted 6/25. S/p trach with ENT on 7/3 and GJ tube with IR on 7/5. Treating empirically for possible immune/inflammatory process vs organizing pneumonia (given GG changes on CT) with steroid burst/taper. Gradual improvement in PST on ventilator. She was discharged to LTACH 7/18/024 in the afternoon but was readmitted to MICU the same evening for suspected acute on chronic hypoxic respiratory failure, but likely exacerbated by severe anxiety episode. Transferred back to LTAC 7/19.     Subjective: In bed on AC in no distress but still with crippling anxiety at times.  We placed her on trach cap and tolerating well on 3L via NC(home O2 level). Appears in no respiratory distress despite repeatedly asking to stop d/t anxiety.  at bedside encouraging her to continue.  Otherwise no new complaints from patient.     Ventilator weaning results:  7/17: PST 3hr  7/18: PS 10 for  total 7hr   7/19: no wean, new admit   7/20: Passed BDT, wore in-line PMV 17min. PS 12 for 50 min(stopped per pt request)  7/21: PS 12 for 1hr 40min(tachypnea, pt request to stop)  7/22: 50L/50%TM/PMV for 35min(stopped d/t anxiety)  7/23: PS 15 for 12min, then 15min of TM/PMV(pt requesting to stop d/t anxiety)    Tracheal secretions: suctioned 5x in past 24hr for scant to moderate amounts, thick     Clinical status discussed today with respiratory therapist, hospitalist, patient,  Ranjan, daughter    Pulmonary lung transplant updated via [employer] messaging    ROS: 10-system review obtained and negative with the exception of the symptoms noted above.    Medications:     Current Facility-Administered Medications   Medication Dose Route Frequency Provider Last Rate Last Admin    dextrose 10% infusion   Intravenous Continuous PRN Dulce Hardy MD   1,000 mL at 07/24/24 1358    Patient is already receiving anticoagulation with heparin, enoxaparin (LOVENOX), warfarin (COUMADIN)  or other anticoagulant medication   Does not apply Continuous PRN Dulce Hardy MD        Stop Heparin 60 minutes before end of treatment   Does not apply Continuous PRN Ioana Ho MD         Current Facility-Administered Medications   Medication Dose Route Frequency Provider Last Rate Last Admin    acetylcysteine (MUCOMYST) 20 % nebulizer solution 2 mL  2 mL Nebulization BID Eyad Harding APRN CNP   2 mL at 07/24/24 0837    azithromycin (ZITHROMAX) suspension 125 mg  125 mg Per J Tube Daily Dulce Hardy MD   125 mg at 07/24/24 0913    B and C vitamin Complex with folic acid (NEPHRONEX) liquid 5 mL  5 mL Per Feeding Tube Daily Dulce Hardy MD   5 mL at 07/24/24 0913    carvedilol (COREG) tablet 6.25 mg  6.25 mg Per Feeding Tube BID Casa Chaudhari MD   6.25 mg at 07/23/24 2142    chlorhexidine (PERIDEX) 0.12 % solution 15 mL  15 mL Mouth/Throat Q12H Dulce Hardy MD   15 mL at 07/24/24 0913     epoetin alisha-epbx (RETACRIT) injection 4,000 Units  4,000 Units Intravenous Once per day on Monday Wednesday Friday Dulce Hardy MD   4,000 Units at 07/24/24 1100    fiber modular (BANATROL TF) packet 1 packet  1 packet Per Feeding Tube TID Dulce Hardy MD   1 packet at 07/24/24 0913    heparin ANTICOAGULANT injection 5,000 Units  5,000 Units Subcutaneous Q8H Dulce Hardy MD   5,000 Units at 07/24/24 1358    insulin aspart (NovoLOG) injection (RAPID ACTING)  1-12 Units Subcutaneous Q6H Dulce Hardy MD   3 Units at 07/24/24 0602    insulin glargine (LANTUS PEN) injection 5 Units  5 Units Subcutaneous QAM AC Dulce Hardy MD   5 Units at 07/24/24 0632    levalbuterol (XOPENEX) neb solution 0.63 mg  0.63 mg Nebulization 4x Daily Eyad Harding APRN CNP   0.63 mg at 07/24/24 1155    montelukast (SINGULAIR) tablet 10 mg  10 mg Per J Tube At Bedtime Dulce Hardy MD   10 mg at 07/23/24 2143    pantoprazole (PROTONIX) 2 mg/mL suspension 40 mg  40 mg Per J Tube At Bedtime Dulce Hardy MD   40 mg at 07/23/24 2142    polyethylene glycol (MIRALAX) Packet 17 g  17 g Per Feeding Tube Daily Dulce Haryd MD        posaconazole (NOXAFIL) DR tablet TBEC 300 mg  300 mg Per Feeding Tube QAM Dulce Hardy MD   300 mg at 07/23/24 1307    predniSONE (DELTASONE) tablet 40 mg  40 mg Per J Tube Daily Eyad Harding APRN CNP   40 mg at 07/24/24 0913    Prosource TF20 ENfit Compatibl EN LIQD (PROSOURCE TF20) packet 60 mL  1 packet Per Feeding Tube Daily Dulce Hardy MD   60 mL at 07/24/24 0913    QUEtiapine (SEROquel) tablet 25 mg  25 mg Per J Tube Q8H Dulce Hardy MD   25 mg at 07/24/24 0550    ramelteon (ROZEREM) tablet 8 mg  8 mg Per J Tube At Bedtime Dulce Hardy MD   8 mg at 07/23/24 2143    sodium chloride (NEBUSAL) 3 % neb solution 3 mL  3 mL Nebulization 2 times daily Eyad Harding APRN CNP   3 mL at 07/24/24 1155    sodium chloride (PF)  0.9% PF flush 3 mL  3 mL Intracatheter Q8H Dulce Hardy MD   3 mL at 07/24/24 1358    sulfamethoxazole-trimethoprim (BACTRIM/SEPTRA) suspension 80 mg  10 mL Per Feeding Tube Once per day on Tuesday Thursday Saturday Dulce Hardy MD   80 mg at 07/23/24 2035    tacrolimus (GENERIC) suspension 1 mg  1 mg Per G Tube BID IS Dulce Hardy MD   1 mg at 07/24/24 0820    traZODone (DESYREL) tablet 50 mg  50 mg Per J Tube At Bedtime Casa Chaudhari MD   50 mg at 07/23/24 2143    valGANciclovir (VALCYTE) solution 450 mg  450 mg Per J Tube Once per day on Tuesday Saturday Dulce Hardy MD   450 mg at 07/23/24 2035    Vitamin D3 (CHOLECALCIFEROL) tablet 50 mcg  50 mcg Per J Tube Once per day on Monday Wednesday Friday Dulce Hardy MD   50 mcg at 07/24/24 0913         Exam/Data:   Vitals  /65   Pulse 88   Temp 98.2  F (36.8  C) (Oral)   Resp 26   Wt 52.4 kg (115 lb 8.3 oz)   LMP 06/07/2014 (Exact Date)   SpO2 100%   BMI 20.46 kg/m       I/O last 3 completed shifts:  In: 869 [NG/GT:869]  Out: -   Weight change:     Vent Mode: CMV/AC  FiO2 (%): 25 %  Resp Rate (Set): 18 breaths/min  Tidal Volume (Set, mL): 360 mL  PEEP (cm H2O): 5 cmH2O  Pressure Support (cm H2O): 15 cmH2O  Resp: 26      EXAM:  Gen: No acute distress with trach capped  HEENT: NT, trach midline/intact, no stridor, weak cough/voice   CV: RRR, no m/g/r  Resp: coarse with rhonchi R>L; non-labored; no wheeze   Abd: soft, nontender, BS+  Skin: no visible rashes or lesions  Ext: no edema  Neuro: alert, follows commands     Labs:  Complete Blood Count   Recent Labs   Lab 07/24/24  0630 07/23/24  0701 07/23/24  0554 07/22/24  0900 07/22/24  0631 07/21/24  0626 07/20/24  0650   WBC 4.2 1.1*  --   --  1.3*  --  5.1   HGB 7.9* 7.1* 7.6* 8.7* 8.0*   < > 9.7*    140*  --   --  165  --  216    < > = values in this interval not displayed.     Basic Metabolic Panel  Recent Labs   Lab 07/24/24  1231 07/24/24  0601  07/24/24  0003 07/23/24  1734 07/23/24  0617 07/23/24  0554 07/22/24  1244 07/22/24  0900 07/22/24  0631 07/21/24  1257 07/21/24  0626 07/20/24  1209 07/20/24  0650 07/19/24  1125 07/19/24  0728 07/18/24  0831 07/18/24  0657   NA  --   --   --   --   --  138  --  135 134*  --  137  --  133*  --  133*   < > 132*   POTASSIUM  --   --   --   --   --  3.3*  --  3.6 3.8  --  3.6  --  4.4  --  3.8   < > 3.4   CHLORIDE  --   --   --   --   --   --   --   --  96*  --   --   --  93*  --  96*  --  92*   CO2  --   --   --   --   --   --   --   --  28  --   --   --  27  --  28  --  29   BUN  --   --   --   --   --   --   --   --  54.1*  --   --   --  61.6*  --  35.0*  --  63.3*   CR  --   --   --   --   --   --   --   --  2.11*  --   --   --  2.49*  --  1.80*  --  2.42*   * 190* 231* 206*   < > 156*   < > 176* 199*   < > 145*   < > 200*   < > 90   < > 128*    < > = values in this interval not displayed.     Liver Function Tests  Recent Labs   Lab 07/22/24  0631 07/20/24  0650   AST 25 41   ALT 38 61*   ALKPHOS 287* 474*   BILITOTAL 0.5 0.6   ALBUMIN 2.8* 3.5     Venous Blood Gas  Recent Labs   Lab 07/23/24  0554 07/22/24  0900 07/21/24  0626 07/19/24  0958   PHV 7.41 7.29* 7.35 7.40   PCO2V 48 64* 56* 52*   PO2V 38 28 48* 26   HCO3V 29* 27 28 32*   LASHAUN 5.7* 4.5* 5.2* 6.2*   O2PER  --   --   --  30       Radiology: Personally reviewed; radiology read below    XR CHEST PORT 7/23/2024  Tracheostomy tube remains in place. Stable postoperative changes of sternotomy, right thoracotomy and hilar stent placement. Heart size is within normal limits. Triangular opacity in the inferomedial left hemithorax is unchanged. Mixed interstitial and airspace opacity in the left lower lung field is similar to previous. No new focal consolidation. No pleural effusion or pneumothorax. Gastrostomy in place.    XR CHEST PORT 1 VIEW 7/18/2024   Frontal view of the chest. Stable tracheostomy tube. Stable right-sided bronchial stents. Prior  sternotomy. Stable heart size. Midline trachea. Improved aeration throughout the left lung. No pneumothorax. Right chest wall/pectoral surgical clips.                                                      Chest CT  7/17/2024                                                                IMPRESSION:   1. Overall improved appearance of the confluent groundglass opacities  primarily affecting the bilateral lower lobes. There is a more  appreciable improvement in the right middle lobe and lingula with  relative sparing of the apices.   2. Status post bilateral lung transplantation with stable support  devices.  3. Cholelithiasis.  4. Ectatic ascending aorta measuring up to 4.0 cm.  5. Atherosclerosis.    Eyad Harding CNP  Pulmonary Medicine  [facility]  Pager [phone]  Office: [phone]

## 2024-07-24 NOTE — CONSULTS
Interventional Radiology - Progress Note  Inpatient - [facility]  07/24/2024     IR consult to exchange GJ tube. GJ tube placed 7/5/24. Will plan to schedule 7/25.    ADRIÁN MAGUIRE CNP  Interventional Radiology  [phone]

## 2024-07-24 NOTE — PROVIDER NOTIFICATION
STOP Tube feeds before MIDNIGHT for GJ tube exchange tomorrow (7/25). Anticipate restart of feedings likely mid day.

## 2024-07-24 NOTE — PLAN OF CARE
Problem: Anxiety Signs/Symptoms  Goal: Improved Mood Symptoms (Anxiety Signs/Symptoms)  Outcome: Progressing     Problem: Wound  Goal: Optimal Pain Control and Function  Outcome: Progressing   Goal Outcome Evaluation:    Pt in bed during shift, family at bedside attentive to pt.  Pt had complaints of pain only when moving at 2nd toe on left foot.  Skin noted to be purple, dime sized, not broken.    New wrist band to show change in code in place.    Pt had increased anxiety requested IV Ativan new order for one time dose received and given to pt.    G tube continues to be clogged despite several attempts to flush.  New order for tube feeding to continue to J tube and meds be given in J tube obtained.

## 2024-07-25 NOTE — PLAN OF CARE
7 PM to 7 AM RT PROGRESS NOTE     DATA:     CURRENT SETTINGS:             TRACH TYPE / SIZE:  6 Shiley placed 7/8/24             MODE:   AC 18, 360, +5, 25%    ACTION:             THERAPIES:   QID Xopenex and BID Saline neb given             SUCTION:                           FREQUENCY:   X 4                        AMOUNT:   small x 1, mod x 3                        CONSISTENCY:   thick                        COLOR:   white             SPONTANEOUS COUGH EFFORT/STRENGTH OF EFFORT (not elicited by suctioning): Good                              WEANING PHASE:   Cap 30-60min BID to start.  May attempt PS days as tolerated when not capping.  AC night                         WEAN MODE:                            WEAN TIME:                           END WEAN REASON:        RESPONSE:             BS:   CS and dim             VITAL SIGNS:   SATs %, HR 85, RR 20             RISK FOR SELF DECANNULATION:  No    NOTE / PLAN:   Anticipate pt going to [facility] on our portable vent at 0630 for a GJ tube. Report called to Charge RT at [facility]. Continue with same otherwise.      Problem: Mechanical Ventilation Invasive  Goal: Optimal Device Function  Outcome: Progressing  Intervention: Optimize Device Care and Function  Recent Flowsheet Documentation  Taken 7/24/2024 2013 by Montse Leigh, RT  Airway Safety Measures:   all equipment/monitors on and audible   manual resuscitator/mask/valve in room  Goal: Mechanical Ventilation Liberation  Outcome: Progressing  Goal: Absence of Device-Related Skin and Tissue Injury  Outcome: Progressing  Goal: Absence of Ventilator-Induced Lung Injury  Outcome: Progressing

## 2024-07-25 NOTE — PLAN OF CARE
Problem: Enteral Nutrition  Goal: Safe, Effective Therapy Delivery  Outcome: Not Progressing  Intervention: Prevent Feeding-Related Adverse Events  Recent Flowsheet Documentation  Taken 7/24/2024 1600 by Angelika Srinivasan RN  Enteral Feeding Safety: tubing labeled as enteral feeding  Taken 7/24/2024 0900 by Angelika Srinivasan, RN  Enteral Feeding Safety: tubing labeled as enteral feeding   Goal Outcome Evaluation:     Pt is AOX3, disoriented to time. She was very anxious at the beginning of the shift.       IV Zofran Q6H PRN at 1013 for her nausea.        G tube clogged from yesterday and J tube was used for TF and medication in AM.  The J tube clogged at around 1230.  MD updated.  Creon and lipase was ordered and instilled at 1326.  D10% started at 30 ml/hr.      Lorazepam 1 mg one time dose given at 1324.         JT still clogged after creon instilled and let dwell for an hour or so, not able to withdraw or flush the tube.  Tacrolimus scheduled at 1800, spoke to pharmacist, he stated there is no IV tacrolimus.  Tacrolimus not given. Hydroxyzine IM ordered but discontinued by provider, med not available per pharmacy.  Patient is denying nausea and anxiety at 1630.  She stated she is okay and is calm right now, declined both Ativan and Hydroxyzine (one time dose for both) .  Carvedilol 6.25 mg BID held earlier in AM to be given after HD treatment but the G tube clogged, so this was not given. Current BP At 1600 is 125/60 HR 88.  MD informed of the aforementioned updates.      BG was 203 at 1731, MD updated and MD ordered to discontinue D10% and Novolog sliding scale not given per MD order.      She had 2 large, soft BMs this shift and was incontinent of urine in the morning.  HD treatment completed at 1300 and pulled 1.5 L.     Pt was calm, no longer anxious and denies pain at the end of the shift and is in a much better head space.  No further complaints at 1930.      Angelika Srinivasan, RN

## 2024-07-25 NOTE — PLAN OF CARE
Problem: Mechanical Ventilation Invasive  Goal: Mechanical Ventilation Liberation  Outcome: Not Progressing     Problem: Mechanical Ventilation Invasive  Goal: Optimal Device Function  Outcome: Progressing  Intervention: Optimize Device Care and Function  Recent Flowsheet Documentation  Taken 7/25/2024 1544 by Jailyn Cuadra, RT  Airway Safety Measures: all equipment/monitors on and audible  Taken 7/25/2024 1121 by Jailyn Cuadra, RT  Airway Safety Measures: all equipment/monitors on and audible  Taken 7/25/2024 1008 by Jailyn Cuadra, RT  Airway Safety Measures: all equipment/monitors on and audible  Goal: Absence of Device-Related Skin and Tissue Injury  Outcome: Progressing  Goal: Absence of Ventilator-Induced Lung Injury  Outcome: Progressing

## 2024-07-25 NOTE — PROGRESS NOTES
"SPIRITUAL HEALTH SERVICES (SHS)  SPIRITUAL ASSESSMENT Progress Note  [facility]. Unit LTACH     REFERRAL SOURCE: Consult (Routine)      Met with Ranjan and his wife Kecia though Kecia seemed in discomfort/pain and did not participate in the conversation.  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     Ranjan is missing being on the farm - other family members have had to chip in with his crops this year. He's also had difficulty finding a place to stay in [facility] and lives too far away to commute (2.5 hours).      He and Kecia are members of [employer] in [place]      PLAN: [facility] will provide spiritual and emotional support during Kecia's hospitalization.      Symone Vazquez, Ph.D., [facility]      [facility]S available 24/7 for emergency requests/referrals, either by having the on-call  paged or by entering an ASAP/STAT consult in Epic (this will also page the on-call ).  "

## 2024-07-25 NOTE — PLAN OF CARE
Problem: Pain Acute  Goal: Optimal Pain Control and Function  Outcome: Progressing  Intervention: Prevent or Manage Pain  Recent Flowsheet Documentation  Taken 7/25/2024 0100 by Pranav Cordero RN  Medication Review/Management: medications reviewed  Intervention: Optimize Psychosocial Wellbeing  Recent Flowsheet Documentation  Taken 7/25/2024 0100 by Pranav Cordero RN  Supportive Measures: active listening utilized   Problem: Adult Inpatient Plan of Care  Goal: Optimal Comfort and Wellbeing  Intervention: Provide Person-Centered Care  Recent Flowsheet Documentation  Taken 7/25/2024 0100 by Pranav Cordero RN  Trust Relationship/Rapport:   care explained   choices provided   Goal Outcome Evaluation:    At about 0530, pt c/o abdominal pain 6/10;  prn tylenol was given as ordered with relief.  Scheduled Seroquel and prn Ativan were given for anxiety prophylaxis.  Prn D10 ran at 30cc/hr while tube feeding was on hold per protocol.  Blood sugar this morning was 132; no insulin given.  Pt slept comfortably most of the night and vitals were stable. Pt left the unit for IR appointment at 0650.  Pt left with D10 running and portable vent.

## 2024-07-25 NOTE — PROGRESS NOTES
Pulmonary Progress Note    Admit Date: 7/19/2024  CODE: No CPR- Pre-arrest intubation OK    Assessment/Plan:     Problems:  Acute on chronic hypoxic and hypercapnic respiratory failure suspect d/t post obstructive multi-lobar pneumonia 2/2 right bronchus stenosis s/p stent RMB to BI & bifurcating stent in RUL. Completed antibiotics. S/p trach on prolonged mechanical ventilation. Remains on minimal vent settings. Continue poor weaning largely it seems d/t anxiety.   6 Shiley tracheostomy placed 7/3/24: Hx trach in 2021 s/p decann  Oropharyngeal dysphagia status post PEG tube(replaced today d/t being clogged) on tube feeds: Passed BDT here   Possible  on steroid taper(accelerated d/t worsening pancytopenia)  S/p BSLT for ILD 2018 c/b CLAD, possible ACR  - DSA neg 7/3  Immunosuppression: Tacro goal level 8-10. 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 EBV 1680 and log 3.2 on 7/22  ESRD on iHD  Anxiety with panic attacks limiting weaning potential: Ativan & Seroquel PRN  Pancytopenia: worsening 7/22 s/p Neupagen x1 7/23: Med related? On VGCV as above. Accelerated prednisone taper. CMV still <35 w log 1.5 so continuing VGCV. Counts a little lower today but stable     Plan/Recommendations:  Repeat EBV PCR next week   CBC & diff daily for now   Low threshold for blood cultures if showing signs of sepsis  Phase 3 vent wean pathway: Cap 30-60min max BID.  Otherwise may attempt PS during the day and continue AC at night.  CxR weekly, next on Monday   Steroid taper: Currenlty prednisone 40mg daily(decrease by 5mg q2 weeks for now)  Nebs: levalbuterol QID, Mucomyst BID alternating with HTS for stent   PTA Singulair   Azithromycin 125 mg daily (resumed 7/18 at lower dose, prior held 7/10 for prolonged Qtc;  Qtc 428 on 7/23)  Escitalopram 10mg daily for ELOISA - ECG tomorrow   Tacro levels qMTh - pharmacy and transplant to adjust dose accordingly. 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 The patient was admitted on 6/18/24 for progressive hypoxia with dyspnea and worsening hypercapnia in ED requiring intubation likely d/t post-obstructive PNA 2/2 right bronchus stenosis. S/p IP bronch (6/20) with laser tissue debulking RMB stenosis, airway balloon dilation of RMB and BI, and placement of stent RMB to BI and bifurcating stent in RUL. S/p CRRT (6/20-7/2), iHD resumed 7/4. Recurrence of paroxysmal afib with RVR first noted 6/25. S/p trach with ENT on 7/3 and GJ tube with IR on 7/5. Treating empirically for possible immune/inflammatory process vs organizing pneumonia (given GG changes on CT) with steroid burst/taper. Gradual improvement in PST on ventilator. She was discharged to LTACH 7/18/024 in the afternoon but was readmitted to MICU the same evening for suspected acute on chronic hypoxic respiratory failure, but likely exacerbated by severe anxiety episode. Transferred back to LTAC 7/19.     Subjective: In bed resting on AC, appears comfortable.  PEG tube replaced today just received some pain meds.  updated at bedside.     Ventilator weaning results:  7/17: PST 3hr  7/18: PS 10 for total 7hr   7/19: no wean, new admit   7/20: Passed BDT, wore in-line  PMV 17min. PS 12 for 50 min(stopped per pt request)  7/21: PS 12 for 1hr 40min(tachypnea, pt request to stop)  7/22: 50L/50%TM/PMV for 35min(stopped d/t anxiety)  7/23: PS 15 for 12min, then 15min of TM/PMV(pt requesting to stop d/t anxiety)  7/24: Capped 1hr 15min     Tracheal secretions: suctioned 5x in past 24hr for scant to moderate amounts, thick     Clinical status discussed today with respiratory therapist    ROS: 10-system review obtained and negative with the exception of the symptoms noted above.    Medications:     Current Facility-Administered Medications   Medication Dose Route Frequency Provider Last Rate Last Admin    dextrose 10% infusion   Intravenous Continuous PRN Dulce Hardy MD 30 mL/hr at 07/25/24 0009 1,000 mL at 07/25/24 0009    Patient is already receiving anticoagulation with heparin, enoxaparin (LOVENOX), warfarin (COUMADIN)  or other anticoagulant medication   Does not apply Continuous PRN Dulce Hardy MD        Stop Heparin 60 minutes before end of treatment   Does not apply Continuous PRN Ioana Ho MD         Current Facility-Administered Medications   Medication Dose Route Frequency Provider Last Rate Last Admin    acetylcysteine (MUCOMYST) 20 % nebulizer solution 2 mL  2 mL Nebulization BID Eyad Harding APRN CNP   2 mL at 07/24/24 1548    azithromycin (ZITHROMAX) suspension 125 mg  125 mg Per J Tube Daily Dulce Hardy MD   125 mg at 07/24/24 0913    B and C vitamin Complex with folic acid (NEPHRONEX) liquid 5 mL  5 mL Per Feeding Tube Daily Dulce Hardy MD   5 mL at 07/24/24 0913    carvedilol (COREG) tablet 6.25 mg  6.25 mg Per Feeding Tube BID Casa Chaudhari MD   6.25 mg at 07/24/24 2100    chlorhexidine (PERIDEX) 0.12 % solution 15 mL  15 mL Mouth/Throat Q12H Dulce Hardy MD   15 mL at 07/24/24 2058    [START ON 7/26/2024] epoetin alisha-epbx (RETACRIT) injection 4,000 Units  4,000 Units Intravenous Once per day on Monday  Wednesday Friday Casa Chaudhari MD        escitalopram (LEXAPRO) solution 10 mg  10 mg Per Feeding Tube Daily HardingEyad quintero APRN CNP        fiber modular (BANATROL TF) packet 1 packet  1 packet Per Feeding Tube TID Dulce Hardy MD   1 packet at 07/24/24 2100    [Held by provider] heparin ANTICOAGULANT injection 5,000 Units  5,000 Units Subcutaneous Q8H ServDulce amato MD   5,000 Units at 07/24/24 1358    insulin aspart (NovoLOG) injection (RAPID ACTING)  1-12 Units Subcutaneous Q6H Dulce Hardy MD   1 Units at 07/25/24 0009    insulin glargine (LANTUS PEN) injection 5 Units  5 Units Subcutaneous QAM AC Dulce Hardy MD   5 Units at 07/24/24 0632    levalbuterol (XOPENEX) neb solution 0.63 mg  0.63 mg Nebulization 4x Daily Eyad Harding APRN CNP   0.63 mg at 07/24/24 2020    montelukast (SINGULAIR) tablet 10 mg  10 mg Per J Tube At Bedtime Dulce Hardy MD   10 mg at 07/24/24 2059    pantoprazole (PROTONIX) 2 mg/mL suspension 40 mg  40 mg Per J Tube At Bedtime Dulce Hardy MD   40 mg at 07/24/24 2059    polyethylene glycol (MIRALAX) Packet 17 g  17 g Per Feeding Tube Daily Dulce Hardy MD        posaconazole (NOXAFIL) DR tablet TBEC 300 mg  300 mg Per Feeding Tube QA Dulce Hardy MD   300 mg at 07/23/24 1307    predniSONE (DELTASONE) tablet 40 mg  40 mg Per J Tube Daily Eyad Harding APRN CNP   40 mg at 07/24/24 0913    Prosource TF20 ENfit Compatibl EN LIQD (PROSOURCE TF20) packet 60 mL  1 packet Per Feeding Tube Daily Dulce Hardy MD   60 mL at 07/24/24 0913    QUEtiapine (SEROquel) tablet 25 mg  25 mg Per J Tube Q8H ServDulce amato MD   25 mg at 07/25/24 0533    ramelteon (ROZEREM) tablet 8 mg  8 mg Per J Tube At Bedtime Dulce Hardy MD   8 mg at 07/24/24 2100    sodium chloride (NEBUSAL) 3 % neb solution 3 mL  3 mL Nebulization 2 times daily Eyad Harding APRN CNP   3 mL at 07/24/24 2020    sodium chloride (PF) 0.9% PF  flush 3 mL  3 mL Intracatheter Q8H Dulce Hardy MD   3 mL at 07/25/24 0534    sulfamethoxazole-trimethoprim (BACTRIM/SEPTRA) suspension 80 mg  10 mL Per Feeding Tube Once per day on Tuesday Thursday Saturday Dulce Hardy MD   80 mg at 07/23/24 2035    tacrolimus (GENERIC) suspension 1 mg  1 mg Per G Tube BID IS Dulce Hardy MD   1 mg at 07/24/24 0820    traZODone (DESYREL) tablet 50 mg  50 mg Per J Tube At Bedtime Casa Chaudhari MD   50 mg at 07/24/24 2059    valGANciclovir (VALCYTE) solution 450 mg  450 mg Per J Tube Once per day on Tuesday Saturday Dulce Hardy MD   450 mg at 07/23/24 2035    Vitamin D3 (CHOLECALCIFEROL) tablet 50 mcg  50 mcg Per J Tube Once per day on Monday Wednesday Friday Dulce Hardy MD   50 mcg at 07/24/24 0913         Exam/Data:   Vitals  /54 (BP Location: Right arm)   Pulse 78   Temp 98.4  F (36.9  C) (Oral)   Resp 21   Wt 51.6 kg (113 lb 12.1 oz)   LMP 06/07/2014 (Exact Date)   SpO2 98%   BMI 20.15 kg/m       I/O last 3 completed shifts:  In: 537 [I.V.:349; NG/GT:188]  Out: 1500 [Other:1500]  Weight change:     Vent Mode: CMV/AC  FiO2 (%): 25 %  Resp Rate (Set): 18 breaths/min  Tidal Volume (Set, mL): 360 mL  PEEP (cm H2O): 5 cmH2O  Resp: 22      EXAM:  Gen: No acute distress on AC  HEENT: NT, trach midline/intact  CV: RRR, no m/g/r  Resp: coarse on R, clear on L; non-labored; no wheeze   Abd: soft, nontender, BS+  Skin: no visible rashes or lesions  Ext: no edema  Neuro: alert, follows commands     Labs:  Complete Blood Count   Recent Labs   Lab 07/25/24  0642 07/24/24  0630 07/23/24  0701 07/23/24  0554 07/22/24  0900 07/22/24  0631   WBC 3.8* 4.2 1.1*  --   --  1.3*   HGB 7.6* 7.9* 7.1* 7.6*   < > 8.0*    177 140*  --   --  165    < > = values in this interval not displayed.     Basic Metabolic Panel  Recent Labs   Lab 07/25/24  0537 07/25/24  0008 07/24/24  1727 07/24/24  1231 07/23/24  0617 07/23/24  0554 07/22/24  1244  07/22/24  0900 07/22/24  0631 07/21/24  1257 07/21/24  0626 07/20/24  1209 07/20/24  0650 07/19/24  1125 07/19/24  0728   NA  --   --   --   --   --  138  --  135 134*  --  137  --  133*  --  133*   POTASSIUM  --   --   --   --   --  3.3*  --  3.6 3.8  --  3.6  --  4.4  --  3.8   CHLORIDE  --   --   --   --   --   --   --   --  96*  --   --   --  93*  --  96*   CO2  --   --   --   --   --   --   --   --  28  --   --   --  27  --  28   BUN  --   --   --   --   --   --   --   --  54.1*  --   --   --  61.6*  --  35.0*   CR  --   --   --   --   --   --   --   --  2.11*  --   --   --  2.49*  --  1.80*   * 142* 203* 179*   < > 156*   < > 176* 199*   < > 145*   < > 200*   < > 90    < > = values in this interval not displayed.     Liver Function Tests  Recent Labs   Lab 07/22/24  0631 07/20/24  0650   AST 25 41   ALT 38 61*   ALKPHOS 287* 474*   BILITOTAL 0.5 0.6   ALBUMIN 2.8* 3.5     Venous Blood Gas  Recent Labs   Lab 07/23/24  0554 07/22/24  0900 07/21/24  0626 07/19/24  0958   PHV 7.41 7.29* 7.35 7.40   PCO2V 48 64* 56* 52*   PO2V 38 28 48* 26   HCO3V 29* 27 28 32*   LASHAUN 5.7* 4.5* 5.2* 6.2*   O2PER  --   --   --  30       Radiology: Personally reviewed; radiology read below    XR CHEST PORT 7/23/2024  Tracheostomy tube remains in place. Stable postoperative changes of sternotomy, right thoracotomy and hilar stent placement. Heart size is within normal limits. Triangular opacity in the inferomedial left hemithorax is unchanged. Mixed interstitial and airspace opacity in the left lower lung field is similar to previous. No new focal consolidation. No pleural effusion or pneumothorax. Gastrostomy in place.    XR CHEST PORT 1 VIEW 7/18/2024   Frontal view of the chest. Stable tracheostomy tube. Stable right-sided bronchial stents. Prior sternotomy. Stable heart size. Midline trachea. Improved aeration throughout the left lung. No pneumothorax. Right chest wall/pectoral surgical clips.                                                       Chest CT  7/17/2024                                                                IMPRESSION:   1. Overall improved appearance of the confluent groundglass opacities  primarily affecting the bilateral lower lobes. There is a more  appreciable improvement in the right middle lobe and lingula with  relative sparing of the apices.   2. Status post bilateral lung transplantation with stable support  devices.  3. Cholelithiasis.  4. Ectatic ascending aorta measuring up to 4.0 cm.  5. Atherosclerosis.    Eyad Harding CNP  Pulmonary Medicine  [facility]  Pager [phone]  Office: [phone]

## 2024-07-25 NOTE — PLAN OF CARE
Problem: Anxiety Signs/Symptoms  Goal: Optimized Energy Level (Anxiety Signs/Symptoms)  Outcome: Progressing  Goal: Optimized Cognitive Function (Anxiety Signs/Symptoms)  Outcome: Progressing  Goal: Improved Mood Symptoms (Anxiety Signs/Symptoms)  Outcome: Progressing  Goal: Improved Sleep (Anxiety Signs/Symptoms)  Outcome: Progressing  Goal: Improved Somatic Symptoms (Anxiety Signs/Symptoms)  Outcome: Progressing     Problem: Pain Acute  Goal: Optimal Pain Control and Function  Outcome: Progressing  Intervention: Develop Pain Management Plan  Recent Flowsheet Documentation  Taken 7/25/2024 1325 by Angelika Srinivasan RN  Pain Management Interventions:   declines   rest  Taken 7/25/2024 1246 by Angelika Srinivasan RN  Pain Management Interventions: medication (see MAR)  Taken 7/25/2024 1225 by Angelika Srinivasan RN  Pain Management Interventions:   rest   pain management plan reviewed with patient/caregiver  Taken 7/25/2024 1110 by Angelika Srinivasan RN  Pain Management Interventions: medication (see MAR)     Problem: Enteral Nutrition  Goal: Absence of Aspiration Signs and Symptoms  Outcome: Progressing  Goal: Safe, Effective Therapy Delivery  Outcome: Progressing  Goal: Feeding Tolerance  Outcome: Progressing   Goal Outcome Evaluation:     Pt came back to the unit at 0958 from IR appointment per stretcher with EMS.  GJT replacement was uneventful.  She came with her D10% infusion at 30 m/hr.  EMS gave her Zofran 4 mg at 0958 while in transit to unit.  Zofran effective with nausea.       MD gave the order to restart TF (Novasource renal) at old rate of 30 ml/hr, TF through J tube and meds through G tube.  Restarted TF and dc'd D10% infusion at 1050.       PRN Tylenol given at 1110 and it was not effective in relation to her abdominal pain on her GJT site.  Oxycodone 5 mg solution X1 dose given at 1246 and it was effective.  Her pain went down from 9/10 to 1/10 after re-assessment.  PRN Lorazepam 0.5  mg given at 1632 to premedicate her befpre weaning from vent.  She refused the PRN Seroquel and she refused the Lorazepam 0.25 mg.  She had 5/10 pain on her GT site but declined PRN pain medication at this time.  She stated she was fine.        AVF dressing removed.       Patient was still capped at 1830, talking to the family on her iPad.  She had 1 medium, soft BM this shift with no urine output.  Bladder scan done and it was 170.    She is in bed, stable with no further complaints at the end of the shift.         Angelika Srinivasan RN

## 2024-07-25 NOTE — PROGRESS NOTES
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 Hospital course complicated by hypotension, delirium, and prolonged respiratory failure.  She has a tracheostomy placement on 7/3 and PEG-J tube placement with IR on 7/5.      Patient arrived on LTACH unit on 7/18 in severe respiratory distress. She complained of nausea, chest pressure, and dsypnea. She is tachypneic and breathing at 41 breaths per minute. She is tripoding and pulse ox reading in low 80's, FiO2 on vent was increased to 50%. She has severe rhonchi and rales in all lung fields, worst in RUL. She was suctioned twice and minimal secretions noted and was given xopenex. Her rhochi and rales of L lung field improved, but not the RUL. She is having severe anxiety and was given 0.5 mg of ativan IVP. She is complaining of chest pressure and tachycardic to 133. EKG done and revealed an incomplete right bundle branch block, no ST-T wave changes in contiguous leads. BP is 215/91- given 10mg of IV hydralazine, BP decreased to 185/99. Spoke with Dr. Sanches and he accepted pt back to [facility].  On 7/19, pt has improved and vitals are now stable and has been transferred back to [place].    LTACH course:  7/20-7/22: +anxiety, has seroquel as first line and ativan as second line prn.  Not sleeping well and not tolerating weaning well.  Added lantus 5 unit(s) for hyperglycemia.  Increased seroquel to 25 q8h  7/23-7/28:  Met with palliative medicine per  limited ventilator weaning. Code status updated to DNR/I OK; requesting improved anxiety control and aware this may continue to limit ventilator weaning attempts. Pancytopenia withOUT absolute neutropenia.    Assessment & Plan      #Acute on chronic hypoxic hypercapnic respiratory failure, improving  #Concern for possible immune vs inflammatory process, improving  #HAP, Stenotrophomonas maltophilia, Enterococcus faecalis, and Candida guilliermondi complex  #Severe pulmonary edema  #Acute respiratory distress syndrome, resolved  Presented to the ED on 6/18/2024 with acute on chronic hypoxic hypercapnic respiratory failure. Transferred to MICU and intubated on overnight on 6/18. Workup significant for Stenotrophomonas, Enterococcus, and Candida pneumonia. Course was c/b progression to ARDS (6/21-22). Tracheostomy placed on 7/3 due to inability to wean from ventilator.  - Pulmonary toilet  - Mucomyst BID  - Hypertonic saline BID, alternate with mucomyst  - Albuterol neb QID  - Antibiotics as below  -pulmonology following for ventilator weaning (phase 2)         #Recurrent right middle bronchus stenosis s/p dilation and stent (6/20/2024), stable  Has history (bronchoscopy 2/15/24) demonstrating narrowing of right mainstem transplant anastomosis and bronchus intermedius. CT chest (6/18/2024) and bronchoscopy (6/19/2024) demonstrated occlusion of right middle bronchus. With concern for post-obstructive pneumonia, interventional pulmonology was consulted, and completed bronchoscopy (6/20/2024) with tissue debulking, right middle bronchus balloon dilation to 11 mm, stent placement in right main bronchus, and bifurcating cast placement in right upper bronchus. IP pulm re-evaluated bronchial stents with BAL and noted that they were open.    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   - Prospera (7/3) 2.26  - DSA (7/3) negative  - PTA nebs  - Fluticasone-viilanterol (breo ellipta) every day - HOLD with respiratory infection  - Montelukast every day   - Immunosuppressants per Tx Pulm:   -Tacrolimus 1mg BID  - Prednisone 40mg PO every day (baseline chronic 5mg Daily)  -azathioprine - HOLD per Transplant pulmonology with repeat infections  - Peripheral smear (7/9) pending for pancytopenia  - Antibiotic prophylaxis               - Bactrim MWF for PJP ppx (monitor need to adjust pending HD plan)  - CMV weekly monitoring (qTuesday)  - Azithromycin 125mg per pulmonary, continue to monitor QTc  - Continue VGCV ppx (renally dosed, monitor need to adjust pending HD plan) with tentative plan for 3 weeks beyond completion of stress dose steroids   - Steroid Taper per Transplant Pulmonology as below:  Date Daily dose (mg)   7/11 50   7/25 45   8/8 40   8/22 35   9/5 30   9/19 25   10/17 20   11/14 15   12/12 10   1/9 5      - Hold PTA prednisone dose while completing steroid taper as above     #HAP, Stenotrophomonas maltophilia, Enterococcus faecalis, Aspergillus, and Candida guilliermondi complex  Presented to the ED on 6/18/2024 with SOB and hypercapnic respiratory failure. Found on bronchoscopy (6/19) to have RML obstruction by narrowing bronchus intermedius as well as copious mucopurulent secretions/mucus plugging. Previously treated for Stenotrophonomas/aspergillus pneumonia in 11/2023 with subsequent sputum culture (2/2024) negative for both Stenotrophonomas/Aspergillus. Etiology likely repeat Stenotrophomonas infection vs opportunistic infection from colonized microbe.   - Workup  - 6/18 sputum cx: Stenotrophomonas maltophilia (ceftazidime and levofloxacin R)  - 6/19 BAL cx: Stenotrophomonas maltophilia, Enterococcus faecalis (gentamicin R),  Aspergillus (speciation in process)  - 6/21 BAL cx: Stenotrophomonas maltophilia and Enterococcus faecalis VRE  - 6/24 sputum cx: Stenotrophomonas maltophilia, Enterococcus faecalis VRE, and Candida guilliermondi complex  - Transplant ID recommendations prior to LTACH transfer:               - Continue posaconazole 300 mg/day   - Continue VGCV prophylaxis  - Weekly CMV VL (Tuesdays, next 7/9), last was 39 (7/2)         - Azithromycin per pulmonary (holding due to QTC, but will follow up)  - TMP/SMX SS daily for PJP PPx (for life given h/o PJP)  - Awaiting susceptibilities on Aspergillus per transplant ID  - Present antibiotics               - Continue posaconazole 300 mg/day per pulm transplant (6/25 to present) for candida guillermondii and asergillus ochraceus on prior BAL  - BAL fluid (7/3): pink, hazy, cell counts normal range, fluid sent for viral, bacterial and fungal cultures negative to date, cytology negative for malignancy and organisms; preliminary AFB negative     #Hx Aspergillus PNA (11/2023)  #Hx PJP PNA (1/2021)  #Hx CMV viremia, recurrent  #Hx EBV viremia  - Transplant ID consult, appreciate recs  PTA posaconazole (Treatment for hx of aspergillus PNA)  PTA azithromycin 250mg qd (CLAD ppx) (holding)  PTA bactrim (PJP ppx)  -CMV PCR 7/16 positive but <35     #ESRD on iHD (T,Th,Sat)  Hypokalemia  History of ESRD 2/2 Tacrolimus toxicity on HD (MWF). With soft blood pressures, placed RIJ (6/20/2024) and started CRRT (6/20/2024). US of AVF 7/5: arterial inflow patent without inflow stenosis, normal diameter of anastamosis, venous outflow elevated velocity at subclavian- suggestive of recurrent stenosis in venous outflow (area of prior angioplasty in March). IR consulted who noted that IR fistulogram not required at that time, and was able to run iHD on beginning on 7/6 without issue.   - Nephrology consulted and following for ESRD, patient tolerating 3x weekly HD at time of discharge  - Renally-dosed  medications  - BMP daily  - HD T/ Th/ Sa schedule now  -replete electrolytes as needed      #Anxiety  Insomnia   Patient has been having anxiety regarding pressure support trials and weaning from the ventilator. Health Psychology consulted and followed patient weekly while hospitalized.   -seroqeul 25mg PO TID  -ramelteon 8 mg at bedtime  -trazadone 50mg at bedtime   -ativan 1mg PO q4h\prn   -seroquel 25 mg q6h\prn      #Toxic metabolic encephalopathy, resolved  Patient noted to have lethargy in setting of sepsis and delirium in the setting of high-dose steroid therapy which improved with management of underlying sepsis. Patient mental status returned to baseline at day of discharge.   - Quetiapine to 25 mg at bedtime   - Ramalteon 8mg PO at bedtime  - Trazodone 50mg for sleep  - Delirium precautions  - Do not disturb order overnight to promote sleep     #Septic shock, resolved  On 6/19/2024, developed sepsis in the setting of stenotrophomonas pneumonia/enterococcus faecium VRE UTI. Resolved.     #Demand Ischemia, resolved    Presented with elevated troponin (peak 499). Not associated with chest pain or hypoxia. EKG without ST elevations/depressions or T wave inversions. Suspect demand ischemia in the setting of sepsis. Will continue to monitor symptoms and continuous telemetry. EKG 7/2 sinus rhythm with PACs.     #Paroxysmal A-Fib, improved  #Pulmonary Hypertension   #Transient arhythmia with palpitations  History of pulmonary hypertension (45 mmHg, echo 10/2023) in the setting of ILD and paroxysmal afib on PTA metoprolol tartrate BID. Not on anticoagulation for afib. Transient unspecified arhythmia overnight (7/9) with palpitations. EKG 7/9 sinus rhythm with fusion complexes and known RBBB. Repeat EKG 7/10 showing sinus rhythm with no RBBB or fusion complexes. Patient given magnesium 1g and 20mEq K+ for K 3.3 this am. No further episodes of palpitations. Currently in sinus rhythm.  - continue PTA metoprolol  tartrate 25mg BID  - CTM     #Prolonged Qtc, resolved  Repeat EKG 7/13 with QTC of 483.   - Azithromycin ppx restarted per pulm transplant  -Qtc 476 (7/17)  -Daily EKG        Severe protein calorie deficiency  PEG-J placed by IR on 7/5, continue tube feeds.   - Nutrition consulted  - Tube feeds 30ml/hr; at goal     #Diarrhea, resolved  On 6/21, had 8 bowel movements. Occurred in the setting of scheduled bowel regimen and starting new antibiotics (meropenem, levofloxacin). C diff negative.   - Holding PRN bowel regimen for loose stools  - Miralax PRN   - Senna prn  - formula changes made per nutrition      #Cholelithiasis with gallbladder wall thickening, resolved  During admission patient found to have up trending LFTs and fever in setting of GB wall thickening on CT abdomen/pelvis, now resolved. MRCP completed showing borderline dilated CBD up to 1.1 cm, no significant intrahepatic biliary dilation, no evidence of choledocholithiasis, no evidence of acute cholecystitis.     #Risk for hyperglycemia with high dose steroids  Stress dose steroids discontinued, resume PTA prednisone 5 mg daily. Blood sugars have remained in appropriate range.  - lantus 5 unit(s) qam  -sliding scale insulin         #Pyuria, Enterococcus faecium VRE and  ESBL E. Coli   Asymptomatic. With progressive IV pressor needs, obtained broad infectious workup which demonstrated UCx (6/19/2024) with Enterococcus faecium VR and ESBL E. Coli. S/p Daptomycin (6/21-6/23) and Meropenem (6/21-6/24).        #Acute on Chronic Normocytic Anemia, stable  #Thrombocytopenia, chronic, improving  History of chronic anemia in the setting of kidney disease (baseline Hgb 10-11). Upon admission, Hgb 9. May be bone marrow suppression in the setting of chronic illness; potential contribution by blood loss with CRRT filter changes (~150-200c). Peripheral smear (6/18/2024) without evidence of schistocytes.      FOLLOW UPS NEEDED  -pulmonology for dedicated bronchoscopy  for stent visualisation (anticipated 8/15)      ANTIBIOTICS COURSES:              - s/p micafungin 150 mg/day (6/25-7/9)  - s/p IV minocycline 100mg BD x 14 days total (6/20-7/5)- for stenotrophamonas  - PO linezolid 600 mg BID x 10 days (6/25-7/5)- for VRE in urine and BAL cultures  - S/p ceftazidime (6/25-6/28)               - S/p vancomycin (6/18-6/20)              - S/p zosyn (6/18-21)  - S/p Daptomycin (6/21-6/23)  - S/p Meropenem (6/21-6/24)  - S/p Levofloxacin (6/21-6/23)        Diet: Adult Formula Drip Feeding: Continuous Novasource Renal; Jejunostomy; Goal Rate: 30; mL/hr; OK to begin at goal rate. Please abide by 8-hour hang-time for food safety. Pour 237 mL (1 carton) of formula into TF pump bag per 8-hour batch.  NPO per Anesthesia Guidelines for Procedure/Surgery Except for: Meds, Ice Chips, NPO but receiving Tube Feeding    DVT Prophylaxis: Heparin SQ  Basilio Catheter: Not present  Lines: None     Cardiac Monitoring: None  Code Status: No CPR- Pre-arrest intubation OK      Clinically Significant Risk Factors              # Hypoalbuminemia: Lowest albumin = 2.8 g/dL at 7/22/2024  6:31 AM, will monitor as appropriate                 #Precipitous drop in Hgb/Hct: Lowest Hgb this hospitalization: 7.1 g/dL. Will continue to monitor and treat/transfuse as appropriate.         # Financial/Environmental Concerns: none         Disposition Plan     Medically Ready for Discharge: Anticipated in 5+ Days             Casa Chaudhari MD  Hospitalist Service  LTACH  Securely message with [employer] (more info)  Text page via [place] Paging/Directory   ______________________________________________________________________    Interval History   No overnight events. Multiple attempts unsuccessful to clear G/J tube; GJ exchange at [facility] today well tolerated overall but with increased fatigue/anxiety.    Physical Exam   Vital Signs: Temp: 98.4  F (36.9  C) Temp src: Oral BP: 105/54 Pulse: 78   Resp: 21 SpO2: 98 % O2  Device: Mechanical Ventilator    Weight: 113 lbs 12.12 oz    General:  No acute distress, cachectic , temporal muscle wasting  Eyes:  Sclera non icteric, normal conjuctiva  Neck :  Supple, no lymphadenopathy, trach in place   Lungs: Rhonchi noted in all lung fields   CVS:  S1 and S2, regular rate and rhythem  Abdomen:  Soft, nontender, PEG in place   Musculoskeletal:  No pain or swelling.  No edema  Neuro:  Alert and oriented.  No acute deficit     Medical Decision Making       65 MINUTES SPENT BY ME on the date of service doing chart review, history, exam, documentation & further activities per the note.  MANAGEMENT DISCUSSED with the following over the past 24 hours: patient, spouse   NOTE(S)/MEDICAL RECORDS REVIEWED over the past 24 hours: RN notes, consultant notes, RT notes  Tests ORDERED & REVIEWED in the past 24 hours:  - See lab/imaging results included in the data section of the note      Data   Imaging results reviewed over the past 24 hrs:   No results found for this or any previous visit (from the past 24 hour(s)).  Most Recent 3 CBC's:  Recent Labs   Lab Test 07/24/24  0630 07/23/24  0701 07/23/24  0554 07/22/24  0900 07/22/24  0631   WBC 4.2 1.1*  --   --  1.3*   HGB 7.9* 7.1* 7.6*   < > 8.0*   * 109*  --   --  109*    140*  --   --  165    < > = values in this interval not displayed.     Most Recent 3 BMP's:  Recent Labs   Lab Test 07/25/24  0537 07/25/24  0008 07/24/24  1727 07/23/24  0617 07/23/24  0554 07/22/24  1244 07/22/24  0900 07/22/24  0631 07/20/24  1209 07/20/24  0650 07/19/24  1125 07/19/24  0728   NA  --   --   --   --  138  --  135 134*   < > 133*  --  133*   POTASSIUM  --   --   --   --  3.3*  --  3.6 3.8   < > 4.4  --  3.8   CHLORIDE  --   --   --   --   --   --   --  96*  --  93*  --  96*   CO2  --   --   --   --   --   --   --  28  --  27  --  28   BUN  --   --   --   --   --   --   --  54.1*  --  61.6*  --  35.0*   CR  --   --   --   --   --   --   --  2.11*  --  2.49*   --  1.80*   ANIONGAP  --   --   --   --   --   --   --  10  --  13  --  9   CHELSEY  --   --   --   --   --   --   --  8.5*  --  8.6*  --  8.6*   * 142* 203*   < > 156*   < > 176* 199*   < > 200*   < > 90    < > = values in this interval not displayed.     Most Recent 2 LFT's:  Recent Labs   Lab Test 07/22/24  0631 07/20/24  0650   AST 25 41   ALT 38 61*   ALKPHOS 287* 474*   BILITOTAL 0.5 0.6     Most Recent 3 INR's:  Recent Labs   Lab Test 06/18/24  1418 03/05/24  0925 02/14/24  1050   INR 0.98 0.98 0.96

## 2024-07-25 NOTE — PROGRESS NOTES
Pt in IR holding from LTACH. Assessed pt and helped with transfer to IR. No complications. LTACH notified when pt transported back.    Yenni Alberto, RT

## 2024-07-25 NOTE — IR NOTE
Called and gave report to Nicol Valero RN, no questions at the end of report. Pt transported back to [facility] with paramedic team.

## 2024-07-26 NOTE — PLAN OF CARE
Problem: Hemodialysis  Goal: Safe, Effective Therapy Delivery  Outcome: Progressing     Problem: Hemodialysis  Goal: Effective Tissue Perfusion  Outcome: Progressing     Problem: Hemodialysis  Goal: Absence of Infection Signs and Symptoms  Outcome: Progressing   Goal Outcome Evaluation:

## 2024-07-26 NOTE — PROGRESS NOTES
HEMODIALYSIS TREATMENT NOTE    Date: 7/26/2024  Time: 6:45 PM    Data:  Modality: bed   Pre Wt: 51.6  Desired Wt:   kg   Post Wt: 51  Weight change: 1.0 kg  Ultrafiltration - Post Run Net Total Removed (mL): 1000 mL  Vascular Access Site: patent   Dialyzer Rinse: Clear  Total Blood Volume Processed: 93.1 Liters  Total Dialysis (Treatment) Time: 3.5 Hours  Dialysate Bath: K 2, Ca 2.5  Heparin: Heparin: None    Lab:   No    Interventions:  Dialysis done through: AV Graft to left inner upper arm, Arterial site is above, venous site is below  UF set to 1.3 Liters of fluid removal, accommodating priming and rinse back volumes  BFR: Blood Flow Rate (BFR): 350-450 mL/min  DFR: Potassium/Calcium Rate: 600 mL/min  Epogen administered per MAR  Vital signs monitored every 15 min and PRN  Crit line used for fluid volume monitoring'  Stable profile A/B throughout the run, tolerating UF pull,   Tx ended as expected, rinsed back successfully, needles pulled, pressure applied, homeostasis achieved in 12 min arterial, 10 min venous.  dressing applied and secured with tape, to be removed in 4-6 hours, see HD flowsheets for additional information.  Report given to PCN, pt left in room in stable condition.    Assessment:  A/O x 3, calm & cooperative, denies pain  Access site intact, previous dressing clean and dry     Plan:    Per Renal team

## 2024-07-26 NOTE — PLAN OF CARE
Problem: Adult Inpatient Plan of Care  Goal: Optimal Comfort and Wellbeing  Outcome: Progressing  Intervention: Provide Person-Centered Care  Recent Flowsheet Documentation  Taken 7/26/2024 0130 by Rogelio Guerra RN  Trust Relationship/Rapport:   care explained   choices provided     Problem: Pain Acute  Goal: Optimal Pain Control and Function  Outcome: Progressing  Intervention: Prevent or Manage Pain  Recent Flowsheet Documentation  Taken 7/26/2024 0130 by Rogelio Guerra RN  Medication Review/Management: medications reviewed  Intervention: Optimize Psychosocial Wellbeing  Recent Flowsheet Documentation  Taken 7/26/2024 0130 by Rogelio Guerra RN  Supportive Measures: active listening utilized   Goal Outcome Evaluation:         At 1953 sepsis protocol fired up, v/s and lactic acid was checked and was in normal limits.  At 0430 pt c/o abdominal pain at  rate of 6/10, tylenol given with good result, slept most of  the shift,  was on trach vent  at night, tolerated it well,  pt is calm and cooperative with  cares, blood sugar checked with coverage,  vitals  is  stable, will continue to monitor. /65 (BP Location: Right arm)   Pulse 76   Temp 97.9  F (36.6  C) (Oral)   Resp 23   Wt 51.6 kg (113 lb 12.1 oz)   LMP 06/07/2014 (Exact Date)   SpO2 97%   BMI 20.15 kg/m           Latest Reference Range & Units 07/25/24 23:49 07/26/24 05:16   GLUCOSE BY METER POCT 70 - 99 mg/dL 239 (H) 213 (H)   (H): Data is abnormally high

## 2024-07-26 NOTE — PROGRESS NOTES
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    Has been on hemodialysis since 2019.    Historically ran at [facility] under cares of Dr Glasgow  Has left upper extremity AVG with good function.  TT 3.5 hours, MWF  Lidocaine added to HD orders for her fistula   Her arterial and venous access are reversed on her arm     Volume:  Wts stable ~ 49kg     Hyponatremia: Mild, borderline  UF with HD.     ACD:   PTA Mircera.    Dosing EPO while inpatient.    Hemoglobin uptrending on low-dose EPO 4000 units 3 times weekly     ESRD MBD:   Mild hyperphosphatemia does not appear to be on binders.    Consider binders if phosphorus persistently >5.5    Mild hypocalcemia when corrected for hypoalbuminemia.    Vitamin D level 37 7/22  PTH 91 7/23    HTN  On monotherapy with Coreg.  BP acceptable, 120s - 140s    Respiratory Failure  Followed by pulmonary, on ventilator    Hospital Acquired Pneumo    S/p bilat lung transplant 2018  IS: Tacrolimus, azathioprine, prednisone  Prophylaxis: Bactrim, azithromycin, valgancyclovir. Renally dosed.     Anxiety     Pain    Protein/calorie deficiency   PEG-J placed    Per Palliative note 7/23 - plans for Tues 7/30 for family to meet with palliative to discuss goals of care. Pt wishes for family to \"all be on the same page\"    SUBJECTIVE:    Pt seen in room with  present  No acute concerns form pt   Ranjan seems very concerned and upset regarding lidocaine, assured him orders are in place.   Asking how much fluid will be drawn, review that this is based on scale weights and monitored in real time by hemodialysis nurse for safety.   Plans to continue MWF dialysis    OBJECTIVE:  Physical Exam   Temp: 98.3  F (36.8  C) Temp src: Oral BP: (!) 142/68 Pulse: 86   Resp: 28 SpO2: 97 % O2 Device: Mechanical Ventilator Oxygen Delivery: 3 LPM  Vitals:    07/22/24 0639 07/24/24 0700 07/25/24 0509   Weight: 49.1 kg (108 lb 3.9 " oz) 52.4 kg (115 lb 8.3 oz) 51.6 kg (113 lb 12.1 oz)     Vital Signs with Ranges  Temp:  [97.9  F (36.6  C)-98.5  F (36.9  C)] 98.3  F (36.8  C)  Pulse:  [72-92] 86  Resp:  [20-28] 28  BP: (129-142)/(65-71) 142/68  FiO2 (%):  [25 %] 25 %  SpO2:  [97 %-100 %] 97 %  I/O last 3 completed shifts:  In: 904 [I.V.:126; NG/GT:778]  Out: -         Patient Vitals for the past 72 hrs:   Weight   07/25/24 0509 51.6 kg (113 lb 12.1 oz)   07/24/24 0700 52.4 kg (115 lb 8.3 oz)   [unfilled]    PHYSICAL EXAM:  General: A&Ox3, NAD  HEENT:  Pupils equal, round, no scleral icterus  NECK:  no carotid bruits, no JVD  RESPIRATORY:  Trach in place, softly speaks words  CARDIOVASCULAR:  RRR, no murmur  VOLUME: very mild edema lower legs   ABDOMEN:  soft, BS present, non-tender, non-distended  GENITOURINARY:  No rivas   INTEGUMENTARY: Warm, dry, no rash  NEUROLOGIC: grossly intact   Psych: calm, cooperative  ACCESS: Upper Left AVF, arterial/venous reversed on her access    LABORATORY STUDIES:     Recent Labs   Lab 07/26/24  0635 07/25/24 2001 07/25/24  0642 07/24/24  0630 07/23/24  0701 07/23/24  0554 07/22/24  0900 07/22/24  0631 07/21/24  0626 07/20/24  0650   WBC 4.0  --  3.8* 4.2 1.1*  --   --  1.3*  --  5.1   RBC 2.27*  --  2.28* 2.32* 2.05*  --   --  2.35*  --  2.82*   HGB 7.9* 8.4* 7.6* 7.9* 7.1* 7.6*   < > 8.0*   < > 9.7*   HCT 24.8*  --  24.8* 25.3* 22.4*  --   --  25.5*  --  31.2*     --  157 177 140*  --   --  165  --  216    < > = values in this interval not displayed.       Basic Metabolic Panel:  Recent Labs   Lab 07/26/24  0516 07/25/24  2349 07/25/24 2001 07/25/24  1721 07/25/24  1217 07/25/24  1033 07/23/24  0617 07/23/24  0554 07/22/24  1244 07/22/24  0900 07/22/24  0631 07/21/24  1257 07/21/24  0626 07/20/24  1209 07/20/24  0650   NA  --   --  133*  --   --   --   --  138  --  135 134*  --  137  --  133*   POTASSIUM  --   --  4.7  --   --   --   --  3.3*  --  3.6 3.8  --  3.6  --  4.4   CHLORIDE  --   --   --   --    --   --   --   --   --   --  96*  --   --   --  93*   CO2  --   --   --   --   --   --   --   --   --   --  28  --   --   --  27   BUN  --   --   --   --   --   --   --   --   --   --  54.1*  --   --   --  61.6*   CR  --   --   --   --   --   --   --   --   --   --  2.11*  --   --   --  2.49*   * 239* 204* 211* 170* 114*   < > 156*   < > 176* 199*   < > 145*   < > 200*   CHELSEY  --   --   --   --   --   --   --   --   --   --  8.5*  --   --   --  8.6*    < > = values in this interval not displayed.       INRNo lab results found in last 7 days.     Recent Labs   Lab Test 07/26/24  0635 07/25/24 2001 07/25/24  0642 06/18/24  1803 06/18/24  1418 03/05/24  0925   INR  --   --   --   --  0.98 0.98   WBC 4.0  --  3.8*   < > 4.5 1.0*   HGB 7.9* 8.4* 7.6*   < > 12.1 10.5*     --  157   < > 147* 96*    < > = values in this interval not displayed.       Personally reviewed current labs       Shayla Mark PA-C  [facility]  [phone]

## 2024-07-26 NOTE — PHARMACY-IMMUNOSUPPRESSION MONITORING
Tacrolimus level = 6.9, drawn 7/26 @ 0635  Last dose given 7/25 @ 1747.   This is a 12.5-hour level.  Current dose: 1mg bid   Tacrolimus  goal: 8-10 per transplant team.  New or change in medications with potentially significant interactions with  Tacrolimus: Gtube became clogged during the day on 7/23, Tacrolimus being give via Jtube 7/23 pm & 7/24 am ( will decrease absorption) Jtube clogged 7/24 pm, pm dose TAC not given    Recommendations from transplant team, coordinator, Rossana Nieto, covering for Mikayla Latham: no change  Orders placed  : N/A    Emeli Heaton, [facility],BCCCP, BCCP

## 2024-07-26 NOTE — PROGRESS NOTES
RT PROGRESS NOTE     DATA:     CURRENT SETTINGS:             TRACH TYPE / SIZE:  #6 Katty (placed 7/8)             MODE:   AC 18, 360, +5             FIO2:   25%     ACTION:             THERAPIES:   Xopenex QID, Mucomyst/Sodium Chloride BID             SUCTION:                           FREQUENCY:   x3                        AMOUNT:   Scant to Small                        CONSISTENCY:   Thick/Thin                        COLOR:   White             SPONTANEOUS COUGH EFFORT/STRENGTH OF EFFORT (not elicited by suctioning): Weak Spontaneous Cough                           WEANING PHASE:   Phase 3                        WEAN MODE:    Capped 3L NC                        WEAN TIME:   2 hours and 11 minutes                        END WEAN REASON:   Pt Request     RESPONSE:             BS:   Coarse/Diminished             VITAL SIGNS:   Sating %, HR 81-86, RR 25-32             EMOTIONAL NEEDS / CONCERNS:  Anxiety                RISK FOR SELF DECANNULATION:  No     NOTE / PLAN:   Cap during the day as tolerated and AC at night.  RT will continue to monitor.

## 2024-07-26 NOTE — PLAN OF CARE
Problem: Adult Inpatient Plan of Care  Goal: Optimal Comfort and Wellbeing  Outcome: Progressing  Intervention: Monitor Pain and Promote Comfort  Recent Flowsheet Documentation  Taken 7/26/2024 0831 by Janelle Villa RN  Pain Management Interventions: medication (see MAR)  Intervention: Provide Person-Centered Care  Recent Flowsheet Documentation  Taken 7/26/2024 1130 by Janelle Villa RN  Trust Relationship/Rapport: care explained     Problem: Mechanical Ventilation Invasive  Goal: Optimal Device Function  Outcome: Progressing     Problem: Anxiety Signs/Symptoms  Goal: Optimized Energy Level (Anxiety Signs/Symptoms)  Outcome: Progressing     Problem: Enteral Nutrition  Goal: Absence of Aspiration Signs and Symptoms  Outcome: Progressing  Intervention: Minimize Aspiration Risk  Recent Flowsheet Documentation  Taken 7/26/2024 1130 by Janelle Villa RN  Enteral Feeding Safety: tubing labeled as enteral feeding   Goal Outcome Evaluation:  VSS. Alert and oriented x4. Pt was anxious this morning and IV Ativan given per pt request with effect. Mechanical vent support and was trach capped via 2-3L nasal canula for a few hours and now back to vent support. Pt easily desat during positioning and cares. Pt comfortably in bed and dialysis running at this moment. Cont tube feeding well tolerated. Had one incontinence of bowel this shift and anuric. Spouse at the bedside since the morning. Continue to monitor.

## 2024-07-26 NOTE — PROGRESS NOTES
CLINICAL NUTRITION SERVICES - REASSESSMENT NOTE      Recommendations Ordered by Registered Dietitian (RD):   Switch to alternative TF formula d/t loose stools    Recommend TF as follows:   Type of Feeding Tube: PEG/J (placed 7/5/24)  Enteral Frequency: Continuous  Enteral Regimen: Heather Bailey Renal Support at 40 mL/hr x 24hrs  Modulars: 1 pkt Prosource TF20  Total Enteral Provisions: 960 mL daily provides 1808 kcal (35 kcal per kg), 98g protein (1.9 g per kg), 165g CHO, 19g fiber and 634mL free water. Meets < 100% of DRI's.  Free Water Flush: 30 mL q4 hours  TF + fluid flush = 814 mL per day    Start new formula at 30 mL/hr, increase to goal rate after 8 hours   Future/Additional Recommendations:   Monitor tolerance, labs, weight trends   Malnutrition:   % Weight Loss:  > 5% in 1 month (severe malnutrition)  % Intake:  No decreased intake noted  Subcutaneous Fat Loss:  Orbital region severe depletion and Upper arm region severe depletion  Muscle Loss:  Temporal region moderate depletion, Clavicle bone region severe depletion, Acromion bone region severe depletion, Dorsal hand region severe depletion, Anterior thigh region severe depletion, and Posterior calf region severe depletion  Fluid Retention:  None noted    Malnutrition Diagnosis: Severe malnutrition  In Context of:  Chronic illness or disease     EVALUATION OF PROGRESS TOWARD GOALS   Diet:  Orders Placed This Encounter      NPO per Anesthesia Guidelines for Procedure/Surgery Except for: Meds, Ice Chips, NPO but receiving Tube Feeding    Nutrition Support:    Type of Feeding Tube: PEG/J (placed 7/5/24)  Enteral Frequency: Continuous  Enteral Regimen: Novasource Renal at 30 mL/hr x 24hrs  Modulars: 3 pkts Banatrol TF, 1 pkt Prosource TF20  Total Enteral Provisions: 720 mL daily provides 1655 kcal (32 kcal per kg), 92g protein (1.8 g per kg), 159g CHO, 15g fiber and 516mL free water. Meets < 100% of DRI's.  Free Water Flush: 30 mL q4 hours  TF + fluid flush =  "696 mL free water per day    Intake/Tolerance:    Patient having frequent loose stools, otherwise tolerating well. Mouthed words :so far, so good\" when asked how she felt she was tolerating TF.    Total EN volume received:  6-day average: 590 mL, 82% of prescribed volume received, if intakes accurately recorded.    ASSESSED NUTRITION NEEDS:  Dosing Weight:  52 kg (current weight)  Estimated Energy Needs: 4133-2496+ kcals/day (30-35+ kcals/kg)  Justification: Repletion  Estimated Protein Needs:  grams protein/day (1.5-2 grams of pro/kg)  Justification: HD, repletion  Estimated Fluid Needs: UOP + 500-1000 mL/day   Justification: Per provider pending fluid status and Dialysis    NEW FINDINGS:   - Phase 3 vent wean pathway: Cap days as tolerated. AC at night.  - GJ tube exchanged yesterday d/t clogging  - Palliative visit 7/23 [facility] - life prolonging w/ limits, has been considering transition to comfort focused care as \"this is no way to live\"     Skin: no skin concerns per [facility]OC RN  I/O: +4L since admission  BM: stooling 1-4x/day, loose  Meds: azithromycin, nephronex, novolog, lantus, protonix, miralax daily (not given today), prednisone, vitamin D3  Electrolytes: abnormalities noted below d/t renal status  BG: hyperglycemia noted  Weight: significant losses over the past 8 months, stable w/ fluid shifts since LTACH admission  07/25/24 0509 51.6 kg (113 lb 12.1 oz) --   07/24/24 0700 52.4 kg (115 lb 8.3 oz) Bed scale   07/22/24 0639 49.1 kg (108 lb 3.9 oz) Bed scale   07/21/24 0423 49.2 kg (108 lb 7.5 oz) Bed scale   07/20/24 0424 49 kg (108 lb 0.4 oz) Bed scale   07/19/24 2233 49.1 kg (108 lb 3.9 oz) Bed scale     Recent Labs   Lab 07/26/24  1133 07/26/24  0516 07/25/24  2349 07/25/24 2001 07/25/24  1721 07/25/24  1217   * 213* 239* 204* 211* 170*     Labs:  Electrolytes  Potassium (mmol/L)   Date Value   07/22/2024 3.8   05/26/2022 3.5   02/10/2022 4.3   10/12/2021 4.0   05/07/2021 4.3   05/05/2021 4.5 "   05/03/2021 5.5 (H)     Potassium POCT (mmol/L)   Date Value   07/25/2024 4.7   07/23/2024 3.3 (L)   07/22/2024 3.6     Phosphorus (mg/dL)   Date Value   07/22/2024 3.3   07/20/2024 6.0 (H)   07/19/2024 2.5   07/18/2024 3.6   07/17/2024 3.0   05/03/2021 5.9 (H)   03/29/2021 5.8   03/04/2021 6.1 (H)   03/01/2021 5.0 (H)   02/15/2021 4.5    Blood Glucose  Glucose (mg/dL)   Date Value   05/26/2022 110 (H)   02/10/2022 157 (H)   10/12/2021 108 (H)   09/23/2021 146 (H)   09/22/2021 192 (H)   05/07/2021 125 (H)   05/05/2021 118 (H)   05/03/2021 129 (H)   05/02/2021 122 (H)   05/01/2021 229 (H)     GLUCOSE BY METER POCT (mg/dL)   Date Value   07/26/2024 186 (H)   07/26/2024 213 (H)   07/25/2024 239 (H)   07/25/2024 211 (H)   07/25/2024 170 (H)     Glucose Whole Blood POCT (mg/dL)   Date Value   07/25/2024 204 (H)     Hemoglobin A1C (%)   Date Value   06/25/2024 5.1   05/26/2022 5.2   09/15/2021 5.8 (H)   01/24/2021 6.0 (H)   03/01/2018 5.5   03/01/2018 Canceled, Test credited   02/27/2018 5.3    Inflammatory Markers  CRP Inflammation (mg/L)   Date Value   10/06/2019 25.0 (H)   09/13/2019 58.0 (H)   09/11/2019 66.0 (H)     WBC (10e9/L)   Date Value   05/02/2021 7.5   04/30/2021 6.8   04/29/2021 6.3     WBC Count (10e3/uL)   Date Value   07/26/2024 4.0   07/25/2024 3.8 (L)   07/24/2024 4.2     Albumin (g/dL)   Date Value   07/22/2024 2.8 (L)   07/20/2024 3.5   07/11/2024 2.6 (L)   10/12/2021 2.8 (L)   09/21/2021 2.6 (L)   09/20/2021 3.0 (L)   03/29/2021 3.5   03/01/2021 2.8 (L)   02/27/2021 2.5 (L)      Magnesium (mg/dL)   Date Value   07/22/2024 1.8   07/20/2024 2.4 (H)   07/19/2024 1.6 (L)   04/30/2021 1.7   04/29/2021 1.6   04/08/2021 1.5 (L)     Sodium (mmol/L)   Date Value   07/22/2024 134 (L)   07/20/2024 133 (L)   07/19/2024 133 (L)   05/07/2021 136   05/05/2021 137   05/03/2021 132 (L)     Sodium POCT (mmol/L)   Date Value   07/25/2024 133 (L)   07/23/2024 138   07/22/2024 135    Renal  Urea Nitrogen (mg/dL)   Date  Value   07/22/2024 54.1 (H)   07/20/2024 61.6 (H)   07/19/2024 35.0 (H)   05/26/2022 42 (H)   02/10/2022 44 (H)   10/12/2021 31 (H)   05/07/2021 66 (H)   05/05/2021 53 (H)   05/03/2021 85 (H)     Creatinine (mg/dL)   Date Value   07/22/2024 2.11 (H)   07/20/2024 2.49 (H)   07/19/2024 1.80 (H)   05/07/2021 3.90 (H)   05/05/2021 3.62 (H)   05/03/2021 4.55 (H)     Additional  Triglycerides (mg/dL)   Date Value   05/26/2022 155 (H)   09/15/2021 68   01/24/2021 242 (H)   02/19/2020 77   03/04/2018 191 (H)     Triglycerides (External) (mg/dL)   Date Value   12/31/2019 98     Ketones Urine (mg/dL)   Date Value   06/19/2024 Negative   01/24/2021 5 (A)        Previous Goals:   Pt to meet nutritional needs - not met per chart records  Maintain weight >/= 49 kg - met  Electrolytes WNL  - not met  BG < 180  - not met  Pt will tolerate TF - not met    Previous Nutrition Diagnosis:   Inadequate oral intake related to Respiratory failure as evidenced by NPO and need for EN to meet nutrition needs.     Evaluation: No change    CURRENT NUTRITION DIAGNOSIS  Inadequate oral intake related to Respiratory failure as evidenced by NPO and need for EN to meet nutrition needs.     INTERVENTIONS  Recommendations / Nutrition Prescription  See top of note.    Implementation  EN Composition, EN Schedule, and Feeding Tube Flush    Goals  TF to meet % estimated nutrition needs  Electrolytes WNL   BG < 180   Maintain weight >49 kg  Normalize stooling as able    MONITORING AND EVALUATION:  Progress towards goals will be monitored and evaluated per protocol and Practice Guidelines    Isabel Robertson RDN, STEPHANY  [facility]  Office: [phone] leonardo Kat

## 2024-07-26 NOTE — PROGRESS NOTES
Pulmonary Progress Note    Admit Date: 7/19/2024  CODE: No CPR- Pre-arrest intubation OK    Assessment/Plan:     Problems:  Acute on chronic hypoxic and hypercapnic respiratory failure suspect d/t post obstructive multi-lobar pneumonia 2/2 right bronchus stenosis s/p stent RMB to BI & bifurcating stent in RUL. Completed antibiotics. S/p trach on prolonged mechanical ventilation. Remains on minimal vent settings. Poor weaning with PS and TM largely d/t anxiety so attempting to wean via trach capping during the day with improved success thus far.   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 EBV 1680 and log 3.2 on 7/22  ESRD on iHD  Anxiety with panic attacks limiting weaning potential: Seroquel. Lexapro started 7/25. PRN ativan & Seroquel(hasn't been utilized)  Pancytopenia: worsening 7/22 s/p Neupagen x1 7/23: Med related? On VGCV as above. Accelerated prednisone taper. CMV still <35 w log 1.5 so continuing VGCV. Counts stable today    Plan/Recommendations:  Repeat EBV PCR next week   Low threshold for blood cultures if showing signs of sepsis  Phase 3 vent wean pathway: Cap days as tolerated. AC at night.  CxR weekly, next on Monday   Steroid taper: Currenlty prednisone 40mg daily(decrease to 35mg daily on 8/7)  Nebs: levalbuterol QID, Mucomyst BID alternating with HTS for stent   PTA Singulair   Azithromycin 125 mg daily (resumed 7/18 at lower  "dose, prior held 7/10 for prolonged Qtc; Qtc 438 on 7/26)  Tacro levels qMTh - pharmacy and transplant to adjust dose accordingly. 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 The patient was admitted on 6/18/24 for progressive hypoxia with dyspnea and worsening hypercapnia in ED requiring intubation likely d/t post-obstructive PNA 2/2 right bronchus stenosis. S/p IP bronch (6/20) with laser tissue debulking RMB stenosis, airway balloon dilation of RMB and BI, and placement of stent RMB to BI and bifurcating stent in RUL. S/p CRRT (6/20-7/2), iHD resumed 7/4. Recurrence of paroxysmal afib with RVR first noted 6/25. S/p trach with ENT on 7/3 and GJ tube with IR on 7/5. Treating empirically for possible immune/inflammatory process vs organizing pneumonia (given GG changes on CT) with steroid burst/taper. Gradual improvement in PST on ventilator. She was discharged to LTACH 7/18/024 in the afternoon but was readmitted to MICU the same evening for suspected acute on chronic hypoxic respiratory failure, but likely exacerbated by severe anxiety episode. Transferred back to LTAC 7/19.     Subjective: in bed with trach capped on 2L NC with SpO2 100\"%.  Denies sob, pain, n/v. +anxiety despite resting comfortably.   at bedside.     Ventilator weaning results:  7/17: PST 3hr  7/18: " PS 10 for total 7hr   7/19: no wean, new admit   7/20: Passed BDT, wore in-line PMV 17min. PS 12 for 50 min(stopped per pt request)  7/21: PS 12 for 1hr 40min(tachypnea, pt request to stop)  7/22: 50L/50%TM/PMV for 35min(stopped d/t anxiety)  7/23: PS 15 for 12min, then 15min of TM/PMV(pt requesting to stop d/t anxiety)  7/24: Capped 1hr 15min   7/25: Capped 2hr     Tracheal secretions: suctioned 5x in past 24hr for scant to moderate amounts, thick     Clinical status discussed today with respiratory therapist    ROS: 10-system review obtained and negative with the exception of the symptoms noted above.    Medications:     Current Facility-Administered Medications   Medication Dose Route Frequency Provider Last Rate Last Admin    dextrose 10% infusion   Intravenous Continuous PRN Dulce Hardy MD 30 mL/hr at 07/25/24 0009 1,000 mL at 07/25/24 0009    Patient is already receiving anticoagulation with heparin, enoxaparin (LOVENOX), warfarin (COUMADIN)  or other anticoagulant medication   Does not apply Continuous PRN Dulce Hardy MD        Stop Heparin 60 minutes before end of treatment   Does not apply Continuous PRN Ioana Ho MD         Current Facility-Administered Medications   Medication Dose Route Frequency Provider Last Rate Last Admin    acetylcysteine (MUCOMYST) 20 % nebulizer solution 2 mL  2 mL Nebulization BID Eyad Harding APRN CNP   2 mL at 07/26/24 0731    azithromycin (ZITHROMAX) suspension 125 mg  125 mg Per J Tube Daily Dulce Hardy MD   125 mg at 07/26/24 0833    B and C vitamin Complex with folic acid (NEPHRONEX) liquid 5 mL  5 mL Per Feeding Tube Daily Dulce Hardy MD   5 mL at 07/26/24 0834    carvedilol (COREG) tablet 6.25 mg  6.25 mg Per Feeding Tube BID Casa Chaudhari MD   6.25 mg at 07/26/24 0834    chlorhexidine (PERIDEX) 0.12 % solution 15 mL  15 mL Mouth/Throat Q12H Dulce Hardy MD   15 mL at 07/26/24 0833    epoetin alisha-epbx  (RETACRIT) injection 4,000 Units  4,000 Units Intravenous Once per day on Monday Wednesday Friday Casa Chaudhari MD        escitalopram (LEXAPRO) solution 10 mg  10 mg Per Feeding Tube Daily Eyad Harding APRN CNP   10 mg at 07/26/24 0834    fiber modular (BANATROL TF) packet 1 packet  1 packet Per Feeding Tube TID Dulce Hardy MD   1 packet at 07/26/24 0833    heparin ANTICOAGULANT injection 5,000 Units  5,000 Units Subcutaneous Q8H Dulce Hardy MD   5,000 Units at 07/26/24 0532    insulin aspart (NovoLOG) injection (RAPID ACTING)  1-12 Units Subcutaneous Q6H Dulce Hardy MD   2 Units at 07/26/24 1134    insulin glargine (LANTUS PEN) injection 5 Units  5 Units Subcutaneous QA AC Dulce Hardy MD   5 Units at 07/26/24 0637    levalbuterol (XOPENEX) neb solution 0.63 mg  0.63 mg Nebulization 4x Daily Eyad Harding APRN CNP   0.63 mg at 07/26/24 1209    montelukast (SINGULAIR) tablet 10 mg  10 mg Per J Tube At Bedtime Dulce Hardy MD   10 mg at 07/25/24 2051    pantoprazole (PROTONIX) 2 mg/mL suspension 40 mg  40 mg Per J Tube At Bedtime Dulce Hardy MD   40 mg at 07/25/24 2052    polyethylene glycol (MIRALAX) Packet 17 g  17 g Per Feeding Tube Daily Dulce Hardy MD        posaconazole (NOXAFIL) DR tablet TBEC 300 mg  300 mg Per Feeding Tube QAM Dulce Hardy MD   300 mg at 07/25/24 1246    predniSONE (DELTASONE) tablet 40 mg  40 mg Per J Tube Daily Eyad Harding APRN CNP   40 mg at 07/26/24 0835    Prosource TF20 ENfit Compatibl EN LIQD (PROSOURCE TF20) packet 60 mL  1 packet Per Feeding Tube Daily Dulce Hardy MD   60 mL at 07/26/24 0833    QUEtiapine (SEROquel) tablet 25 mg  25 mg Per J Tube Q8H Dulce Hardy MD   25 mg at 07/26/24 0424    ramelteon (ROZEREM) tablet 8 mg  8 mg Per J Tube At Bedtime Dulce Hardy MD   8 mg at 07/25/24 2051    sodium chloride (NEBUSAL) 3 % neb solution 3 mL  3 mL Nebulization 2 times daily  Eyad Harding, ADRIÁN CNP   3 mL at 07/26/24 1209    sodium chloride (PF) 0.9% PF flush 3 mL  3 mL Intracatheter Q8H Dulce Hardy MD   3 mL at 07/26/24 0534    sulfamethoxazole-trimethoprim (BACTRIM/SEPTRA) suspension 80 mg  10 mL Per Feeding Tube Once per day on Tuesday Thursday Saturday Dulce Hardy MD   80 mg at 07/25/24 2048    tacrolimus (GENERIC) suspension 1 mg  1 mg Per G Tube BID IS Dulce Hardy MD   1 mg at 07/26/24 0833    traZODone (DESYREL) tablet 50 mg  50 mg Per J Tube At Bedtime Casa Chaudhari MD   50 mg at 07/25/24 2051    valGANciclovir (VALCYTE) solution 450 mg  450 mg Per J Tube Once per day on Tuesday Saturday Dulce Hardy MD   450 mg at 07/23/24 2035    Vitamin D3 (CHOLECALCIFEROL) tablet 50 mcg  50 mcg Per J Tube Once per day on Monday Wednesday Friday Dulce Hardy MD   50 mcg at 07/26/24 0834         Exam/Data:   Vitals  BP (!) 142/68 (BP Location: Left arm)   Pulse 86   Temp 98.3  F (36.8  C) (Oral)   Resp (!) 32   Wt 51.6 kg (113 lb 12.1 oz)   LMP 06/07/2014 (Exact Date)   SpO2 95%   BMI 20.15 kg/m       I/O last 3 completed shifts:  In: 904 [I.V.:126; NG/GT:778]  Out: -   Weight change:     Vent Mode: CMV/AC  FiO2 (%): 25 %  Resp Rate (Set): 18 breaths/min  Tidal Volume (Set, mL): 360 mL  PEEP (cm H2O): 5 cmH2O  Resp: (!) 32      EXAM:  Gen: No acute distress on AC  HEENT: NT, trach midline/intact  CV: RRR, no m/g/r  Resp: coarse on R, clear on L; non-labored; no wheeze   Abd: soft, nontender, BS+  Skin: no visible rashes or lesions  Ext: no edema  Neuro: alert, follows commands     Labs:  Complete Blood Count   Recent Labs   Lab 07/26/24  0635 07/25/24 2001 07/25/24  0642 07/24/24  0630 07/23/24  0701   WBC 4.0  --  3.8* 4.2 1.1*   HGB 7.9* 8.4* 7.6* 7.9* 7.1*     --  157 177 140*     Basic Metabolic Panel  Recent Labs   Lab 07/26/24  1133 07/26/24  0516 07/25/24  2349 07/25/24 2001 07/23/24  0617 07/23/24  0554 07/22/24  1244  07/22/24  0900 07/22/24  0631 07/20/24  1209 07/20/24  0650   NA  --   --   --  133*  --  138  --  135 134*   < > 133*   POTASSIUM  --   --   --  4.7  --  3.3*  --  3.6 3.8   < > 4.4   CHLORIDE  --   --   --   --   --   --   --   --  96*  --  93*   CO2  --   --   --   --   --   --   --   --  28  --  27   BUN  --   --   --   --   --   --   --   --  54.1*  --  61.6*   CR  --   --   --   --   --   --   --   --  2.11*  --  2.49*   * 213* 239* 204*   < > 156*   < > 176* 199*   < > 200*    < > = values in this interval not displayed.     Liver Function Tests  Recent Labs   Lab 07/22/24 0631 07/20/24  0650   AST 25 41   ALT 38 61*   ALKPHOS 287* 474*   BILITOTAL 0.5 0.6   ALBUMIN 2.8* 3.5     Venous Blood Gas  Recent Labs   Lab 07/25/24 2001 07/23/24  0554 07/22/24  0900 07/21/24  0626   PHV 7.32 7.41 7.29* 7.35   PCO2V 59* 48 64* 56*   PO2V 33 38 28 48*   HCO3V 27 29* 27 28   LASHAUN 4.6* 5.7* 4.5* 5.2*       Radiology: Personally reviewed; radiology read below    XR CHEST PORT 7/23/2024  Tracheostomy tube remains in place. Stable postoperative changes of sternotomy, right thoracotomy and hilar stent placement. Heart size is within normal limits. Triangular opacity in the inferomedial left hemithorax is unchanged. Mixed interstitial and airspace opacity in the left lower lung field is similar to previous. No new focal consolidation. No pleural effusion or pneumothorax. Gastrostomy in place.    XR CHEST PORT 1 VIEW 7/18/2024   Frontal view of the chest. Stable tracheostomy tube. Stable right-sided bronchial stents. Prior sternotomy. Stable heart size. Midline trachea. Improved aeration throughout the left lung. No pneumothorax. Right chest wall/pectoral surgical clips.                                                      Chest CT  7/17/2024                                                                IMPRESSION:   1. Overall improved appearance of the confluent groundglass opacities  primarily affecting the  bilateral lower lobes. There is a more  appreciable improvement in the right middle lobe and lingula with  relative sparing of the apices.   2. Status post bilateral lung transplantation with stable support  devices.  3. Cholelithiasis.  4. Ectatic ascending aorta measuring up to 4.0 cm.  5. Atherosclerosis.    Eyad Harding CNP  Pulmonary Medicine  [facility]  Pager [phone]  Office: [phone]

## 2024-07-26 NOTE — PLAN OF CARE
Problem: Mechanical Ventilation Invasive  Goal: Optimal Device Function  Outcome: Progressing  Intervention: Optimize Device Care and Function  Recent Flowsheet Documentation  Taken 7/26/2024 1209 by Jailyn Cuadra, RT  Airway Safety Measures: all equipment/monitors on and audible  Taken 7/26/2024 0958 by Jailyn Cuadra, RT  Airway Safety Measures: all equipment/monitors on and audible  Taken 7/26/2024 0731 by Jailyn Cuadra, RT  Airway Safety Measures: all equipment/monitors on and audible  Goal: Mechanical Ventilation Liberation  Outcome: Progressing  Goal: Absence of Device-Related Skin and Tissue Injury  Outcome: Progressing  Goal: Absence of Ventilator-Induced Lung Injury  Outcome: Progressing

## 2024-07-26 NOTE — PLAN OF CARE
"7 PM to 7 AM RT PROGRESS NOTE     DATA:     CURRENT SETTINGS:             TRACH TYPE / SIZE:  6 Shiley placed 7/8/24             MODE:   AC 18, 360, +5, 25%    ACTION:             THERAPIES:   QID Xopenex and BID Saline neb given             SUCTION:                           FREQUENCY:   X 3                        AMOUNT:   small                         CONSISTENCY:   thin/thick                        COLOR:   white             SPONTANEOUS COUGH EFFORT/STRENGTH OF EFFORT (not elicited by suctioning): Good                              WEANING PHASE:   \"Cap 2 hr max BID to start.  May attempt PS days as tolerated when not capping.  AC night\"                         WEAN MODE:  Capped/ 3 LPM NC                           WEAN TIME:   7278-7358, 2 hours.   END WEAN REASON:   Increased SOB, WOB and anxiety. Also end of target wean.     RESPONSE:             BS:   CS and dim             VITAL SIGNS:   SATs %, HR 79-92, RR 25-28             RISK FOR SELF DECANNULATION:  No    NOTE / PLAN:   FYI: VBG drawn on above settings at 2001: 7.32/59/33/HCO3 27. Primary requested, not Pulmonary. Continue with same.      Problem: Mechanical Ventilation Invasive  Goal: Optimal Device Function  Outcome: Progressing  Intervention: Optimize Device Care and Function  Recent Flowsheet Documentation  Taken 7/24/2024 2013 by Montse Leigh, RT  Airway Safety Measures:   all equipment/monitors on and audible   manual resuscitator/mask/valve in room  Goal: Mechanical Ventilation Liberation  Outcome: Progressing  Goal: Absence of Device-Related Skin and Tissue Injury  Outcome: Progressing  Goal: Absence of Ventilator-Induced Lung Injury  Outcome: Progressing         "

## 2024-07-26 NOTE — PROGRESS NOTES
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 Hospital course complicated by hypotension, delirium, and prolonged respiratory failure.  She has a tracheostomy placement on 7/3 and PEG-J tube placement with IR on 7/5.      Patient arrived on LTACH unit on 7/18 in severe respiratory distress. She complained of nausea, chest pressure, and dsypnea. She is tachypneic and breathing at 41 breaths per minute. She is tripoding and pulse ox reading in low 80's, FiO2 on vent was increased to 50%. She has severe rhonchi and rales in all lung fields, worst in RUL. She was suctioned twice and minimal secretions noted and was given xopenex. Her rhochi and rales of L lung field improved, but not the RUL. She is having severe anxiety and was given 0.5 mg of ativan IVP. She is complaining of chest pressure and tachycardic to 133. EKG done and revealed an incomplete right bundle branch block, no ST-T wave changes in contiguous leads. BP is 215/91- given 10mg of IV hydralazine, BP decreased to 185/99. Spoke with Dr. Sanches and he accepted pt back to [facility].  On 7/19, pt has improved and vitals are now stable and has been transferred back to [facility].    LTACH course:  7/20-7/22: +anxiety, has seroquel as first line and ativan as second line prn.  Not sleeping well and not tolerating weaning well.  Added lantus 5 unit(s) for hyperglycemia.  Increased seroquel to 25 q8h  7/23-7/28:  Met with palliative medicine per  limited ventilator weaning. Code status updated to DNR/I OK; requesting improved anxiety control and aware this may continue to limit ventilator weaning attempts. Pancytopenia withOUT absolute neutropenia.    Assessment & Plan      Acute on chronic hypoxic hypercapnic respiratory failure, improving  HAP, Stenotrophomonas maltophilia, Enterococcus faecalis, and Candida guilliermondi complex  Severe pulmonary edema  Acute respiratory distress syndrome, resolved  Presented to the ED on 6/18/2024 with acute on chronic hypoxic hypercapnic respiratory failure. Transferred to MICU and intubated on overnight on 6/18. Workup significant for Stenotrophomonas, Enterococcus, and Candida pneumonia. Course was c/b progression to ARDS (6/21-22). Tracheostomy placed on 7/3 due to inability to wean from ventilator.  - Pulmonary toilet  - Mucomyst BID  - Hypertonic saline BID, alternate with mucomyst  - Albuterol neb QID  - Antibiotics as below  -pulmonology following for ventilator weaning (phase 2)         Recurrent right middle bronchus stenosis s/p dilation and stent (6/20/2024), stable  Has history (bronchoscopy 2/15/24) demonstrating narrowing of right mainstem transplant anastomosis and bronchus intermedius. CT chest (6/18/2024) and bronchoscopy (6/19/2024) demonstrated occlusion of right middle bronchus. With concern for post-obstructive pneumonia, interventional pulmonology was consulted, and completed bronchoscopy (6/20/2024) with tissue debulking, right middle bronchus balloon dilation to 11 mm, stent placement in right main bronchus, and bifurcating cast placement in right upper bronchus. IP pulm re-evaluated bronchial stents with BAL and noted that they were open.    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   - Prospera (7/3) 2.26  - DSA (7/3) negative  - PTA nebs  - Fluticasone-viilanterol (breo ellipta) every day - HOLD with respiratory infection  - Montelukast every day   - Immunosuppressants per Tx Pulm:   -Tacrolimus 1mg BID  - Prednisone 40mg PO every day (baseline chronic 5mg Daily)  -azathioprine - HOLD per Transplant pulmonology with repeat infections  - Peripheral smear (7/9) pending for pancytopenia  - Antibiotic prophylaxis               - Bactrim MWF for PJP ppx (monitor need to adjust pending HD plan)  - CMV weekly monitoring (qTuesday)  - Azithromycin 125mg per pulmonary, continue to monitor QTc  - Continue VGCV ppx (renally dosed, monitor need to adjust pending HD plan) with tentative plan for 3 weeks beyond completion of stress dose steroids   - Steroid Taper per Transplant Pulmonology as below:  Date Daily dose (mg)   7/11 50   7/25 45   8/8 40   8/22 35   9/5 30   9/19 25   10/17 20   11/14 15   12/12 10   1/9 5      - Hold PTA prednisone dose while completing steroid taper as above     HAP, Stenotrophomonas maltophilia, Enterococcus faecalis, Aspergillus, and Candida guilliermondi complex  Presented to the ED on 6/18/2024 with SOB and hypercapnic respiratory failure. Found on bronchoscopy (6/19) to have RML obstruction by narrowing bronchus intermedius as well as copious mucopurulent secretions/mucus plugging. Previously treated for Stenotrophonomas/aspergillus pneumonia in 11/2023 with subsequent sputum culture (2/2024) negative for both Stenotrophonomas/Aspergillus. Etiology likely repeat Stenotrophomonas infection vs opportunistic infection from colonized microbe.   - Workup  - 6/18 sputum cx: Stenotrophomonas maltophilia (ceftazidime and levofloxacin R)  - 6/19 BAL cx: Stenotrophomonas maltophilia, Enterococcus faecalis (gentamicin R), Aspergillus (speciation in process)  - 6/21 BAL cx: Stenotrophomonas  maltophilia and Enterococcus faecalis VRE  - 6/24 sputum cx: Stenotrophomonas maltophilia, Enterococcus faecalis VRE, and Candida guilliermondi complex  - Transplant ID recommendations prior to LTACH transfer:               - Continue posaconazole 300 mg/day   - Continue VGCV prophylaxis  - Weekly CMV VL (Tuesdays, next 7/9), last was 39 (7/2)         - Azithromycin per pulmonary (holding due to QTC, but will follow up)  - TMP/SMX SS daily for PJP PPx (for life given h/o PJP)  - Awaiting susceptibilities on Aspergillus per transplant ID  - Present antibiotics               - Continue posaconazole 300 mg/day per pulm transplant (6/25 to present) for candida guillermondii and asergillus ochraceus on prior BAL  - BAL fluid (7/3): pink, hazy, cell counts normal range, fluid sent for viral, bacterial and fungal cultures negative to date, cytology negative for malignancy and organisms; preliminary AFB negative     Hx Aspergillus PNA (11/2023)  Hx PJP PNA (1/2021)  Hx CMV viremia, recurrent  Hx EBV viremia  - Transplant ID consult, appreciate recs  PTA posaconazole (Treatment for hx of aspergillus PNA)  PTA azithromycin 250mg qd (CLAD ppx) (holding)  PTA bactrim (PJP ppx)  -CMV PCR 7/16 positive but <35     ESRD on iHD (T,Th,Sat)  Hypokalemia  History of ESRD 2/2 Tacrolimus toxicity on HD (MWF). With soft blood pressures, placed RIJ (6/20/2024) and started CRRT (6/20/2024). US of AVF 7/5: arterial inflow patent without inflow stenosis, normal diameter of anastamosis, venous outflow elevated velocity at subclavian- suggestive of recurrent stenosis in venous outflow (area of prior angioplasty in March). IR consulted who noted that IR fistulogram not required at that time, and was able to run iHD on beginning on 7/6 without issue.   - Nephrology consulted and following for ESRD, patient tolerating 3x weekly HD at time of discharge  - Renally-dosed medications  - BMP daily  - HD T/ Th/ Sa schedule now  -replete electrolytes as  needed      Anxiety  Insomnia   Patient has been having anxiety regarding pressure support trials and weaning from the ventilator. Health Psychology consulted and followed patient weekly while hospitalized. Attempts with seroquel control limited symptomatically as well as by QTc prolongation. Benzodiazepine titration/long acting calculations ongoing.  -ativan 0.5mg PO q6h  -ativan 1mg PO q4h\prn   -ramelteon 8 mg at bedtime  -trazadone 50mg at bedtime        Toxic metabolic encephalopathy, resolved  Patient noted to have lethargy in setting of sepsis and delirium in the setting of high-dose steroid therapy which improved with management of underlying sepsis. Patient mental status returned to baseline at day of discharge.   -ativan 0.5mg PO q6h  -ativan 1mg PO q4h\prn   -ramelteon 8 mg at bedtime  -trazadone 50mg at bedtime   - Delirium precautions  - Do not disturb order overnight to promote sleep     Septic shock, resolved  On 6/19/2024, developed sepsis in the setting of stenotrophomonas pneumonia/enterococcus faecium VRE UTI. Resolved.     Demand Ischemia, resolved    Presented with elevated troponin (peak 499). Not associated with chest pain or hypoxia. EKG without ST elevations/depressions or T wave inversions. Suspect demand ischemia in the setting of sepsis. Will continue to monitor symptoms and continuous telemetry. EKG 7/2 sinus rhythm with PACs.     Paroxysmal A-Fib, improved  Pulmonary Hypertension   Transient arhythmia with palpitations  History of pulmonary hypertension (45 mmHg, echo 10/2023) in the setting of ILD and paroxysmal afib on PTA metoprolol tartrate BID. Not on anticoagulation for afib. Transient unspecified arhythmia overnight (7/9) with palpitations. EKG 7/9 sinus rhythm with fusion complexes and known RBBB. Repeat EKG 7/10 showing sinus rhythm with no RBBB or fusion complexes. Patient given magnesium 1g and 20mEq K+ for K 3.3 this am. No further episodes of palpitations. Currently in  sinus rhythm.  - metoprolol tartrate 25mg BID     Prolonged Qtc, resolved  Repeat EKG 7/13 with QTC of 483.   - Azithromycin ppx restarted per pulm transplant  -Qtc 476 (7/17)  -Daily EKG        Severe protein calorie deficiency  PEG-J placed by IR on 7/5, continue tube feeds.   - Nutrition consulted  - Tube feeds 30ml/hr; at goal     Diarrhea, resolved  On 6/21, had 8 bowel movements. Occurred in the setting of scheduled bowel regimen and starting new antibiotics (meropenem, levofloxacin). C diff negative.   - Holding PRN bowel regimen for loose stools  - Miralax PRN   - Senna prn  - formula changes made per nutrition     Cholelithiasis with gallbladder wall thickening, resolved  During admission patient found to have up trending LFTs and fever in setting of GB wall thickening on CT abdomen/pelvis, now resolved. MRCP completed showing borderline dilated CBD up to 1.1 cm, no significant intrahepatic biliary dilation, no evidence of choledocholithiasis, no evidence of acute cholecystitis.    hyperglycemia with high dose steroids  Stress dose steroids discontinued, resume PTA prednisone 5 mg daily. Blood sugars have remained in appropriate range.  - lantus 5 unit(s) qam  -sliding scale insulin         Pyuria, Enterococcus faecium VRE and  ESBL E. Coli   Asymptomatic. With progressive IV pressor needs, obtained broad infectious workup which demonstrated UCx (6/19/2024) with Enterococcus faecium VR and ESBL E. Coli. S/p Daptomycin (6/21-6/23) and Meropenem (6/21-6/24).        Acute on Chronic Normocytic Anemia, stable  Thrombocytopenia, chronic, improving  History of chronic anemia in the setting of kidney disease (baseline Hgb 10-11). Upon admission, Hgb 9. May be bone marrow suppression in the setting of chronic illness; potential contribution by blood loss with CRRT filter changes (~150-200c). Peripheral smear (6/18/2024) without evidence of schistocytes.      FOLLOW UPS NEEDED  -pulmonology for dedicated  bronchoscopy for stent visualisation (anticipated 8/15)      ANTIBIOTICS COURSES:              - s/p micafungin 150 mg/day (6/25-7/9)  - s/p IV minocycline 100mg BD x 14 days total (6/20-7/5)- for stenotrophamonas  - PO linezolid 600 mg BID x 10 days (6/25-7/5)- for VRE in urine and BAL cultures  - S/p ceftazidime (6/25-6/28)               - S/p vancomycin (6/18-6/20)              - S/p zosyn (6/18-21)  - S/p Daptomycin (6/21-6/23)  - S/p Meropenem (6/21-6/24)  - S/p Levofloxacin (6/21-6/23)        Diet: Adult Formula Drip Feeding: Continuous Novasource Renal; Jejunostomy; Goal Rate: 30; mL/hr; OK to begin at goal rate. Please abide by 8-hour hang-time for food safety. Pour 237 mL (1 carton) of formula into TF pump bag per 8-hour batch.  NPO per Anesthesia Guidelines for Procedure/Surgery Except for: Meds, Ice Chips, NPO but receiving Tube Feeding    DVT Prophylaxis: Heparin SQ  Basilio Catheter: Not present  Lines: None     Cardiac Monitoring: None  Code Status: No CPR- Pre-arrest intubation OK      Clinically Significant Risk Factors              # Hypoalbuminemia: Lowest albumin = 2.8 g/dL at 7/22/2024  6:31 AM, will monitor as appropriate                 #Precipitous drop in Hgb/Hct: Lowest Hgb this hospitalization: 7.1 g/dL. Will continue to monitor and treat/transfuse as appropriate.         # Financial/Environmental Concerns: none         Disposition Plan     Medically Ready for Discharge: Anticipated in 5+ Days             Casa Chaudhari MD  Hospitalist Service  LTACH  Securely message with [employer] ([employer]) (more info)  Text page via [email or web address] Paging/Directory   ______________________________________________________________________    Interval History   No overnight events. Did tolerate some (2hrs) of tracheostomy capping. Anxiety and some abdominal pain at G tube insertion site, improved with current pain regimen.    Physical Exam   Vital Signs: Temp: 98.3  F (36.8  C) Temp src: Oral BP: (!) 142/68 Pulse: 86   Resp:  28 SpO2: 97 % O2 Device: Mechanical Ventilator Oxygen Delivery: 3 LPM  Weight: 113 lbs 12.12 oz    General:  No acute distress, cachectic , temporal muscle wasting  Eyes:  Sclera non icteric, normal conjuctiva  Neck :  Supple, no lymphadenopathy, trach in place   Lungs: Rhonchi noted in all lung fields   CVS:  S1 and S2, regular rate and rhythem  Abdomen:  Soft, nontender, PEG in place   Musculoskeletal:  No pain or swelling.  No edema  Neuro:  Alert and oriented.  No acute deficit     Medical Decision Making       50 MINUTES SPENT BY ME on the date of service doing chart review, history, exam, documentation & further activities per the note.  MANAGEMENT DISCUSSED with the following over the past 24 hours: patient, spouse   NOTE(S)/MEDICAL RECORDS REVIEWED over the past 24 hours: RN notes, consultant notes, RT notes  Tests ORDERED & REVIEWED in the past 24 hours:  - See lab/imaging results included in the data section of the note      Data   Imaging results reviewed over the past 24 hrs:   Recent Results (from the past 24 hour(s))   IR Gastro Jejunostomy Tube Change    Narrative    [facility]  LOCATION: [facility]  DATE: 7/25/2024    PROCEDURE: PERCUTANEOUS GASTROJEJUNOSTOMY EXCHANGE    INTERVENTIONAL RADIOLOGIST: Farhad Arnold MD.    INDICATION: Routine GJ tube exchange    CONTRAST: 10 cc of Omni  ANTIBIOTICS: None.  ADDITIONAL MEDICATIONS: None.    FLUOROSCOPIC TIME: 0.7 minutes.  RADIATION DOSE: AIR KERMA (Ka,r): 5 mGy.    COMPLICATIONS: No immediate complications.    STERILE BARRIER TECHNIQUE: Maximum sterile barrier technique was used. Cutaneous antisepsis was performed at the operative site with application of 2% chlorhexidine and large sterile drape. Prior to the procedure, the  and assistant performed   hand hygiene and wore hat, mask, sterile gown, and sterile gloves during the entire procedure.    PROCEDURE/TECHNIQUE:   The existing GJ tube was assessed visually and  under fluoroscopy a wire was advanced into the proximal jejunum and a new 18 Upper sorbian, 45 cm  gastrojejunostomy tube was then placed. Both ports were injected with contrast and a digital spot image was   obtained.    FINDINGS:   The initial assessment shows the gastric and jejunal lumens to be patent and in appropriate position. After exchange, the new gastrojejunostomy tube is in appropriate position with the gastric port within the stomach and distal jejunal port near the   ligament of Treitz.      Impression    IMPRESSION:    Successful GJ tube replacement.    CPT code for physician reference only:  59311     Most Recent 3 CBC's:  Recent Labs   Lab Test 07/26/24  0635 07/25/24 2001 07/25/24  0642 07/24/24  0630   WBC 4.0  --  3.8* 4.2   HGB 7.9* 8.4* 7.6* 7.9*   *  --  109* 109*     --  157 177     Most Recent 3 BMP's:  Recent Labs   Lab Test 07/26/24  0516 07/25/24  2349 07/25/24 2001 07/23/24  0617 07/23/24  0554 07/22/24  1244 07/22/24  0900 07/22/24  0631 07/20/24  1209 07/20/24  0650 07/19/24  1125 07/19/24  0728   NA  --   --  133*  --  138  --  135 134*   < > 133*  --  133*   POTASSIUM  --   --  4.7  --  3.3*  --  3.6 3.8   < > 4.4  --  3.8   CHLORIDE  --   --   --   --   --   --   --  96*  --  93*  --  96*   CO2  --   --   --   --   --   --   --  28  --  27  --  28   BUN  --   --   --   --   --   --   --  54.1*  --  61.6*  --  35.0*   CR  --   --   --   --   --   --   --  2.11*  --  2.49*  --  1.80*   ANIONGAP  --   --   --   --   --   --   --  10  --  13  --  9   CHELSEY  --   --   --   --   --   --   --  8.5*  --  8.6*  --  8.6*   * 239* 204*   < > 156*   < > 176* 199*   < > 200*   < > 90    < > = values in this interval not displayed.     Most Recent 2 LFT's:  Recent Labs   Lab Test 07/22/24  0631 07/20/24  0650   AST 25 41   ALT 38 61*   ALKPHOS 287* 474*   BILITOTAL 0.5 0.6     Most Recent 3 INR's:  Recent Labs   Lab Test 06/18/24  1418 03/05/24  0925 02/14/24  1050   INR 0.98 0.98  0.96

## 2024-07-27 NOTE — PLAN OF CARE
Problem: Mechanical Ventilation Invasive  Goal: Optimal Device Function  Outcome: Progressing  Intervention: Optimize Device Care and Function  Recent Flowsheet Documentation  Taken 7/27/2024 0030 by Jeremiah Antonio, RT  Airway Safety Measures: all equipment/monitors on and audible  Taken 7/26/2024 2004 by Jeremiah Antonio, RT  Airway Safety Measures: all equipment/monitors on and audible  Goal: Mechanical Ventilation Liberation  Outcome: Progressing  Goal: Absence of Device-Related Skin and Tissue Injury  Outcome: Progressing   RT PROGRESS NOTE     DATA:     CURRENT SETTINGS:ac/18/360/+5             TRACH TYPE / SIZE:  # 6 shiley, 7/8             MODE:   AC mode at night, cap/days             FIO2:   25%     ACTION:             THERAPIES:   xop qid, /saline/mucco, bid             SUCTION:                           FREQUENCY:   x2                        AMOUNT:   small                        CONSISTENCY:   thick                        COLOR:   white             SPONTANEOUS COUGH EFFORT/STRENGTH OF EFFORT (not elicited by suctioning):                               WEANING PHASE:   3                        WEAN MODE:    cap/3L                        WEAN TIME:   2 hrs 11 min, (during the day shift)                        END WEAN REASON:        RESPONSE:             BS:   crs             VITAL SIGNS:   SPO2 96-99%, RR 18-29             EMOTIONAL NEEDS / CONCERNS:                  RISK FOR SELF DECANNULATION:                         RISK DUE TO:                          INTERVENTION/S IN PLACE IS/ARE:         NOTE / PLAN:   Goal Outcome Evaluation:  RT to resume trach capping days as leland

## 2024-07-27 NOTE — PLAN OF CARE
Problem: Adult Inpatient Plan of Care  Goal: Optimal Comfort and Wellbeing  Outcome: Progressing  Intervention: Provide Person-Centered Care  Recent Flowsheet Documentation  Taken 7/27/2024 0800 by Janelle Villa RN  Trust Relationship/Rapport: care explained     Problem: Anxiety Signs/Symptoms  Goal: Optimized Energy Level (Anxiety Signs/Symptoms)  Outcome: Progressing   Goal Outcome Evaluation:  VSS. Alert and oriented x4. Pt was emotional and anxious after having care conversation with the doctor. Received PRN ativan per doctor's order and schedule as well with effect. Pt was trach capped via 3L nasal canula for few hours and now back to vent. TF well tolerated. Insulin coverage given per order. Family at the bedside comforting the pt. Continue to monitor.

## 2024-07-27 NOTE — PLAN OF CARE
Problem: Adult Inpatient Plan of Care  Goal: Optimal Comfort and Wellbeing  Outcome: Progressing  Intervention: Provide Person-Centered Care  Recent Flowsheet Documentation  Taken 7/27/2024 0307 by Lupe Madrid RN  Trust Relationship/Rapport:   care explained   choices provided  Taken 7/26/2024 2239 by Lupe Madrid RN  Trust Relationship/Rapport:   care explained   choices provided     Problem: Anxiety Signs/Symptoms  Goal: Improved Mood Symptoms (Anxiety Signs/Symptoms)  Outcome: Progressing  Intervention: Optimize Emotion and Mood  Recent Flowsheet Documentation  Taken 7/27/2024 0307 by Lupe Madrid RN  Supportive Measures:   active listening utilized   counseling provided  Taken 7/26/2024 2239 by Lupe Madrid RN  Supportive Measures:   active listening utilized   counseling provided  Goal: Improved Somatic Symptoms (Anxiety Signs/Symptoms)  Outcome: Progressing   Goal Outcome Evaluation:       Vitals stable.Blood pressure 108/59, pulse 80, temperature 97.6  F (36.4  C), temperature source Oral, resp. rate 22, weight 51.6 kg (113 lb 12.1 oz), last menstrual period 06/07/2014, SpO2 96%, not currently breastfeeding.   Denied pain when asked. Pt was calm all night. No signs of anxiety noted. Pt was suctioned  a few times .  Pt slept all night.

## 2024-07-27 NOTE — PLAN OF CARE
Problem: Mechanical Ventilation Invasive  Goal: Optimal Device Function  Outcome: Progressing  Intervention: Optimize Device Care and Function  Recent Flowsheet Documentation  Taken 7/27/2024 0030 by Jeremiah Antonio, RT  Airway Safety Measures: all equipment/monitors on and audible  Taken 7/26/2024 2004 by Jeremiah Antonio, RT  Airway Safety Measures: all equipment/monitors on and audible  Goal: Mechanical Ventilation Liberation  Outcome: Progressing  Goal: Absence of Device-Related Skin and Tissue Injury  Outcome: Progressing   RT PROGRESS NOTE     DATA:     CURRENT SETTINGS:ac/18/360/+5/25%             TRACH TYPE / SIZE:  # 6 josette, 7/8             MODE:   AC mode at night, cap/days             FIO2:   25%     ACTION:             THERAPIES:   xop qid, /saline/mucco, bid             SUCTION: yes                          FREQUENCY:   x2                        AMOUNT:   small                        CONSISTENCY:   thick                        COLOR:   white             SPONTANEOUS COUGH EFFORT/STRENGTH OF EFFORT (not elicited by suctioning): weak                               WEANING PHASE:   3                        WEAN MODE:    cap/3L                        WEAN TIME:   1330-8792 am                         END WEAN REASON:        RESPONSE:             BS:   crs             VITAL SIGNS:   SPO2 96-99%, RR 18-29             EMOTIONAL NEEDS / CONCERNS: crs                RISK FOR SELF DECANNULATION: none                        RISK DUE TO:  n/a                        INTERVENTION/S IN PLACE IS/ARE:  n/a       NOTE / PLAN:     Patient requested to end wean, and requested to remain on full vent

## 2024-07-27 NOTE — PROGRESS NOTES
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 Hospital course complicated by hypotension, delirium, and prolonged respiratory failure.  She has a tracheostomy placement on 7/3 and PEG-J tube placement with IR on 7/5.      Patient arrived on LTACH unit on 7/18 in severe respiratory distress. She complained of nausea, chest pressure, and dsypnea. She is tachypneic and breathing at 41 breaths per minute. She is tripoding and pulse ox reading in low 80's, FiO2 on vent was increased to 50%. She has severe rhonchi and rales in all lung fields, worst in RUL. She was suctioned twice and minimal secretions noted and was given xopenex. Her rhochi and rales of L lung field improved, but not the RUL. She is having severe anxiety and was given 0.5 mg of ativan IVP. She is complaining of chest pressure and tachycardic to 133. EKG done and revealed an incomplete right bundle branch block, no ST-T wave changes in contiguous leads. BP is 215/91- given 10mg of IV hydralazine, BP decreased to 185/99. Spoke with Dr. Sanches and he accepted pt back to [facility].  On 7/19, pt has improved and vitals are now stable and has been transferred back to [place].    LTACH course:  7/20-7/22: +anxiety, has seroquel as first line and ativan as second line prn.  Not sleeping well and not tolerating weaning well.  Added lantus 5 unit(s) for hyperglycemia.  Increased seroquel to 25 q8h  7/23-7/28:  Met with palliative medicine per  limited ventilator weaning. Code status updated to DNR/I OK; requesting improved anxiety control and aware this may continue to limit ventilator weaning attempts. Pancytopenia withOUT absolute neutropenia. Kecia indicating that she's tired, further explained that she is now ready for hospice. Family aware, would like to have dedicated discussion of hospice vs. Comfort care, as they would like to consider getting her to home; meeting with palliative medicine again on 7/30.    Assessment & Plan      Acute on chronic hypoxic hypercapnic respiratory failure, improving  HAP, Stenotrophomonas maltophilia, Enterococcus faecalis, and Candida guilliermondi complex  Severe pulmonary edema  Acute respiratory distress syndrome, resolved  Presented to the ED on 6/18/2024 with acute on chronic hypoxic hypercapnic respiratory failure. Transferred to MICU and intubated on overnight on 6/18. Workup significant for Stenotrophomonas, Enterococcus, and Candida pneumonia. Course was c/b progression to ARDS (6/21-22). Tracheostomy placed on 7/3 due to inability to wean from ventilator.  - Pulmonary toilet  - Mucomyst BID  - Hypertonic saline BID, alternate with mucomyst  - Albuterol neb QID  - Antibiotics as below  -pulmonology following for ventilator weaning (phase 2)         Recurrent right middle bronchus stenosis s/p dilation and stent (6/20/2024), stable  Has history (bronchoscopy 2/15/24) demonstrating narrowing of right mainstem transplant anastomosis and bronchus intermedius. CT chest (6/18/2024) and bronchoscopy (6/19/2024) demonstrated occlusion of right middle bronchus. With concern for post-obstructive pneumonia, interventional pulmonology was consulted, and completed bronchoscopy (6/20/2024) with tissue debulking, right middle bronchus balloon dilation to 11 mm, stent placement in right main bronchus, and bifurcating cast placement in right upper bronchus. IP pulm re-evaluated bronchial stents with BAL and noted that they  were open.    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   - Prospera (7/3) 2.26  - DSA (7/3) negative  - PTA nebs  - Fluticasone-viilanterol (breo ellipta) every day - HOLD with respiratory infection  - Montelukast every day   - Immunosuppressants per Tx Pulm:   -Tacrolimus 1mg BID  - Prednisone 40mg PO every day (baseline chronic 5mg Daily)  -azathioprine - HOLD per Transplant pulmonology with repeat infections  - Peripheral smear (7/9) pending for pancytopenia  - Antibiotic prophylaxis               - Bactrim MWF for PJP ppx (monitor need to adjust pending HD plan)  - CMV weekly monitoring (qTuesday)  - Azithromycin 125mg per pulmonary, continue to monitor QTc  - Continue VGCV ppx (renally dosed, monitor need to adjust pending HD plan) with tentative plan for 3 weeks beyond completion of stress dose steroids   - Steroid Taper per Transplant Pulmonology as below:  Date Daily dose (mg)   7/11 50   7/25 45   8/8 40   8/22 35   9/5 30   9/19 25   10/17 20   11/14 15   12/12 10   1/9 5      - Hold PTA prednisone dose while completing steroid taper as above     HAP, Stenotrophomonas maltophilia, Enterococcus faecalis, Aspergillus, and Candida guilliermondi complex  Presented to the ED on 6/18/2024 with SOB and hypercapnic respiratory failure. Found on bronchoscopy (6/19) to have RML obstruction by narrowing bronchus intermedius as well as copious mucopurulent secretions/mucus plugging. Previously treated for Stenotrophonomas/aspergillus pneumonia in 11/2023 with subsequent sputum culture (2/2024) negative for both Stenotrophonomas/Aspergillus. Etiology likely repeat Stenotrophomonas infection vs opportunistic infection from  colonized microbe.   - Workup  - 6/18 sputum cx: Stenotrophomonas maltophilia (ceftazidime and levofloxacin R)  - 6/19 BAL cx: Stenotrophomonas maltophilia, Enterococcus faecalis (gentamicin R), Aspergillus (speciation in process)  - 6/21 BAL cx: Stenotrophomonas maltophilia and Enterococcus faecalis VRE  - 6/24 sputum cx: Stenotrophomonas maltophilia, Enterococcus faecalis VRE, and Candida guilliermondi complex  - Transplant ID recommendations prior to LTACH transfer:               - Continue posaconazole 300 mg/day   - Continue VGCV prophylaxis  - Weekly CMV VL (Tuesdays, next 7/9), last was 39 (7/2)         - Azithromycin per pulmonary (holding due to QTC, but will follow up)  - TMP/SMX SS daily for PJP PPx (for life given h/o PJP)  - Awaiting susceptibilities on Aspergillus per transplant ID  - Present antibiotics               - Continue posaconazole 300 mg/day per pulm transplant (6/25 to present) for candida guillermondii and asergillus ochraceus on prior BAL  - BAL fluid (7/3): pink, hazy, cell counts normal range, fluid sent for viral, bacterial and fungal cultures negative to date, cytology negative for malignancy and organisms; preliminary AFB negative     Hx Aspergillus PNA (11/2023)  Hx PJP PNA (1/2021)  Hx CMV viremia, recurrent  Hx EBV viremia  - Transplant ID consult, appreciate recs  PTA posaconazole (Treatment for hx of aspergillus PNA)  PTA azithromycin 250mg qd (CLAD ppx) (holding)  PTA bactrim (PJP ppx)  -CMV PCR 7/16 positive but <35     ESRD on iHD (T,Th,Sat)  Hypokalemia  History of ESRD 2/2 Tacrolimus toxicity on HD (MWF). With soft blood pressures, placed RIJ (6/20/2024) and started CRRT (6/20/2024). US of AVF 7/5: arterial inflow patent without inflow stenosis, normal diameter of anastamosis, venous outflow elevated velocity at subclavian- suggestive of recurrent stenosis in venous outflow (area of prior angioplasty in March). IR consulted who noted that IR fistulogram not required at that  time, and was able to run iHD on beginning on 7/6 without issue.   - Nephrology consulted and following for ESRD, patient tolerating 3x weekly HD at time of discharge  - Renally-dosed medications  - BMP daily  - HD T/ Th/ Sa schedule now  -replete electrolytes as needed      Anxiety  Insomnia   Patient has been having anxiety regarding pressure support trials and weaning from the ventilator. Health Psychology consulted and followed patient weekly while hospitalized. Attempts with seroquel control limited symptomatically as well as by QTc prolongation. Benzodiazepine titration/long acting calculations ongoing.  -ativan 0.5mg PO q6h  -ativan 1mg PO q4h\prn   -ramelteon 8 mg at bedtime  -trazadone 50mg at bedtime        Toxic metabolic encephalopathy, resolved  Patient noted to have lethargy in setting of sepsis and delirium in the setting of high-dose steroid therapy which improved with management of underlying sepsis. Patient mental status returned to baseline at day of discharge.   -ativan 0.5mg PO q6h  -ativan 1mg PO q4h\prn   -ramelteon 8 mg at bedtime  -trazadone 50mg at bedtime   - Delirium precautions  - Do not disturb order overnight to promote sleep     Septic shock, resolved  On 6/19/2024, developed sepsis in the setting of stenotrophomonas pneumonia/enterococcus faecium VRE UTI. Resolved.     Demand Ischemia, resolved    Presented with elevated troponin (peak 499). Not associated with chest pain or hypoxia. EKG without ST elevations/depressions or T wave inversions. Suspect demand ischemia in the setting of sepsis. Will continue to monitor symptoms and continuous telemetry. EKG 7/2 sinus rhythm with PACs.     Paroxysmal A-Fib, improved  Pulmonary Hypertension   Transient arhythmia with palpitations  History of pulmonary hypertension (45 mmHg, echo 10/2023) in the setting of ILD and paroxysmal afib on PTA metoprolol tartrate BID. Not on anticoagulation for afib. Transient unspecified arhythmia overnight  (7/9) with palpitations. EKG 7/9 sinus rhythm with fusion complexes and known RBBB. Repeat EKG 7/10 showing sinus rhythm with no RBBB or fusion complexes. Patient given magnesium 1g and 20mEq K+ for K 3.3 this am. No further episodes of palpitations. Currently in sinus rhythm.  - metoprolol tartrate 25mg BID     Prolonged Qtc, resolved  Repeat EKG 7/13 with QTC of 483.   - Azithromycin ppx restarted per pulm transplant  -Qtc 476 (7/17)  -Daily EKG        Severe protein calorie deficiency  PEG-J placed by IR on 7/5, continue tube feeds.   - Nutrition consulted  - Tube feeds 30ml/hr; at goal     Diarrhea, resolved  On 6/21, had 8 bowel movements. Occurred in the setting of scheduled bowel regimen and starting new antibiotics (meropenem, levofloxacin). C diff negative.   - Holding PRN bowel regimen for loose stools  - Miralax PRN   - Senna prn  - formula changes made per nutrition     Cholelithiasis with gallbladder wall thickening, resolved  During admission patient found to have up trending LFTs and fever in setting of GB wall thickening on CT abdomen/pelvis, now resolved. MRCP completed showing borderline dilated CBD up to 1.1 cm, no significant intrahepatic biliary dilation, no evidence of choledocholithiasis, no evidence of acute cholecystitis.    hyperglycemia with high dose steroids  Stress dose steroids discontinued, resume PTA prednisone 5 mg daily. Blood sugars have remained in appropriate range.  - lantus 5 unit(s) qam  -sliding scale insulin         Pyuria, Enterococcus faecium VRE and  ESBL E. Coli   Asymptomatic. With progressive IV pressor needs, obtained broad infectious workup which demonstrated UCx (6/19/2024) with Enterococcus faecium VR and ESBL E. Coli. S/p Daptomycin (6/21-6/23) and Meropenem (6/21-6/24).        Acute on Chronic Normocytic Anemia, stable  Thrombocytopenia, chronic, improving  History of chronic anemia in the setting of kidney disease (baseline Hgb 10-11). Upon admission, Hgb 9.  May be bone marrow suppression in the setting of chronic illness; potential contribution by blood loss with CRRT filter changes (~150-200c). Peripheral smear (6/18/2024) without evidence of schistocytes.      FOLLOW UPS NEEDED  -pulmonology for dedicated bronchoscopy for stent visualisation (anticipated 8/15)      ANTIBIOTICS COURSES:              - s/p micafungin 150 mg/day (6/25-7/9)  - s/p IV minocycline 100mg BD x 14 days total (6/20-7/5)- for stenotrophamonas  - PO linezolid 600 mg BID x 10 days (6/25-7/5)- for VRE in urine and BAL cultures  - S/p ceftazidime (6/25-6/28)               - S/p vancomycin (6/18-6/20)              - S/p zosyn (6/18-21)  - S/p Daptomycin (6/21-6/23)  - S/p Meropenem (6/21-6/24)  - S/p Levofloxacin (6/21-6/23)        Diet: NPO per Anesthesia Guidelines for Procedure/Surgery Except for: Meds, Ice Chips, NPO but receiving Tube Feeding  Adult Formula Drip Feeding: Continuous [place] Renal Support; Jejunostomy; Goal Rate: 40; mL/hr; Once current bag runs out, start new formula at 30 mL/hr, increase to goal rate after 8 hours    DVT Prophylaxis: Heparin SQ  Basilio Catheter: Not present  Lines: None     Cardiac Monitoring: None  Code Status: No CPR- Pre-arrest intubation OK      Clinically Significant Risk Factors              # Hypoalbuminemia: Lowest albumin = 2.8 g/dL at 7/22/2024  6:31 AM, will monitor as appropriate                 #Precipitous drop in Hgb/Hct: Lowest Hgb this hospitalization: 7.1 g/dL. Will continue to monitor and treat/transfuse as appropriate.      # Severe Malnutrition: based on nutrition assessment    # Financial/Environmental Concerns: none         Disposition Plan     Medically Ready for Discharge: Anticipated in 5+ Days             Casa Chaudhari MD  Hospitalist Service  LTACH  Securely message with [employer] (more info)  Text page via [facility] Paging/Directory   ______________________________________________________________________    Interval History   No  overnight events. Tolerated 2hrs of trach capping yesterday but with significant anxiety. Kecia tells me this morning that she is tired--further clarifying that she is ready to consider hospice but concerned how this may affect her family, especially daughters.    Dedicated discussion regarding hospice, comfort care, and current projected hospital course. Nasreen Woodruff, and Evelyn have had many conversations about hospice before, but acknowledge that this hospitalisation is different from before. While Kecia has not made a definitive decision on her specific goals of care (as to a change), they feel like they are mostly ready for whatever she chooses, and had questions about getting Kecia home should she chooses hospice care. Regarding these decisions, they are planning for a dedicated conversation again with palliative on 7/30.    Physical Exam   Vital Signs: Temp: 98.7  F (37.1  C) Temp src: Axillary BP: 116/57 Pulse: 78   Resp: 26 SpO2: 98 % O2 Device: Mechanical Ventilator Oxygen Delivery: 2 LPM  Weight: 113 lbs 12.12 oz    General:  No acute distress, cachectic , temporal muscle wasting  Eyes:  Sclera non icteric, normal conjuctiva  Neck :  Supple, no lymphadenopathy, trach in place   Lungs: Rhonchi noted in all lung fields   CVS:  S1 and S2, regular rate and rhythem  Abdomen:  Soft, nontender, PEG in place   Musculoskeletal:  No pain or swelling.  No edema  Neuro:  Alert and oriented.  No acute deficit     Medical Decision Making       55 MINUTES SPENT BY ME on the date of service doing chart review, history, exam, documentation & further activities per the note.  MANAGEMENT DISCUSSED with the following over the past 24 hours: patient, spouse   NOTE(S)/MEDICAL RECORDS REVIEWED over the past 24 hours: RN notes, consultant notes, RT notes  Tests ORDERED & REVIEWED in the past 24 hours:  - See lab/imaging results included in the data section of the note      Data   Imaging results reviewed over the past 24 hrs:    No results found for this or any previous visit (from the past 24 hour(s)).    Most Recent 3 CBC's:  Recent Labs   Lab Test 07/27/24  0633 07/26/24  0635 07/25/24 2001 07/25/24  0642   WBC 3.8* 4.0  --  3.8*   HGB 8.1* 7.9* 8.4* 7.6*   * 109*  --  109*    183  --  157     Most Recent 3 BMP's:  Recent Labs   Lab Test 07/27/24  0547 07/26/24  2344 07/26/24  1707 07/25/24  2349 07/25/24 2001 07/23/24  0617 07/23/24  0554 07/22/24  1244 07/22/24  0900 07/22/24  0631 07/20/24  1209 07/20/24  0650 07/19/24  1125 07/19/24  0728   NA  --   --   --   --  133*  --  138  --  135 134*   < > 133*  --  133*   POTASSIUM  --   --   --   --  4.7  --  3.3*  --  3.6 3.8   < > 4.4  --  3.8   CHLORIDE  --   --   --   --   --   --   --   --   --  96*  --  93*  --  96*   CO2  --   --   --   --   --   --   --   --   --  28  --  27  --  28   BUN  --   --   --   --   --   --   --   --   --  54.1*  --  61.6*  --  35.0*   CR  --   --   --   --   --   --   --   --   --  2.11*  --  2.49*  --  1.80*   ANIONGAP  --   --   --   --   --   --   --   --   --  10  --  13  --  9   CHELSEY  --   --   --   --   --   --   --   --   --  8.5*  --  8.6*  --  8.6*   * 204* 222*   < > 204*   < > 156*   < > 176* 199*   < > 200*   < > 90    < > = values in this interval not displayed.     Most Recent 2 LFT's:  Recent Labs   Lab Test 07/22/24  0631 07/20/24  0650   AST 25 41   ALT 38 61*   ALKPHOS 287* 474*   BILITOTAL 0.5 0.6     Most Recent 3 INR's:  Recent Labs   Lab Test 06/18/24  1418 03/05/24  0925 02/14/24  1050   INR 0.98 0.98 0.96

## 2024-07-28 NOTE — PROGRESS NOTES
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 Hospital course complicated by hypotension, delirium, and prolonged respiratory failure.  She has a tracheostomy placement on 7/3 and PEG-J tube placement with IR on 7/5.      Patient arrived on LTACH unit on 7/18 in severe respiratory distress. She complained of nausea, chest pressure, and dsypnea. She is tachypneic and breathing at 41 breaths per minute. She is tripoding and pulse ox reading in low 80's, FiO2 on vent was increased to 50%. She has severe rhonchi and rales in all lung fields, worst in RUL. She was suctioned twice and minimal secretions noted and was given xopenex. Her rhochi and rales of L lung field improved, but not the RUL. She is having severe anxiety and was given 0.5 mg of ativan IVP. She is complaining of chest pressure and tachycardic to 133. EKG done and revealed an incomplete right bundle branch block, no ST-T wave changes in contiguous leads. BP is 215/91- given 10mg of IV hydralazine, BP decreased to 185/99. Spoke with Dr. Sanches and he accepted pt back to [facility].  On 7/19, pt has improved and vitals are now stable and has been transferred back to [place].    LTACH course:  7/20-7/22: +anxiety, has seroquel as first line and ativan as second line prn.  Not sleeping well and not tolerating weaning well.  Added lantus 5 unit(s) for hyperglycemia.  Increased seroquel to 25 q8h  7/23-7/28:  Met with palliative medicine per  limited ventilator weaning. Code status updated to DNR/I OK; requesting improved anxiety control and aware this may continue to limit ventilator weaning attempts. Pancytopenia withOUT absolute neutropenia. Kecia indicating that she's tired, further explained that she is now ready for hospice. Family aware, would like to have dedicated discussion of hospice vs. Comfort care, as they would like to consider getting her to home; meeting with palliative medicine again on 7/30.    Saturday conversation summary: Nasreen Woodruff, and Evelyn have had many conversations about hospice before, but acknowledge that this hospitalisation is different from before. While Kecia has not made a definitive decision on her specific goals of care (as to a change), they feel like they are mostly ready for whatever she chooses, and had questions about getting Kecia home should she chooses hospice care.    Assessment & Plan      Acute on chronic hypoxic hypercapnic respiratory failure, improving  HAP, Stenotrophomonas maltophilia, Enterococcus faecalis, and Candida guilliermondi complex  Severe pulmonary edema  Acute respiratory distress syndrome, resolved  Presented to the ED on 6/18/2024 with acute on chronic hypoxic hypercapnic respiratory failure. Transferred to MICU and intubated on overnight on 6/18. Workup significant for Stenotrophomonas, Enterococcus, and Candida pneumonia. Course was c/b progression to ARDS (6/21-22). Tracheostomy placed on 7/3 due to inability to wean from ventilator.  - Pulmonary toilet  - Mucomyst BID  - Hypertonic saline BID, alternate with mucomyst  - Albuterol neb QID  - Antibiotics as below  -pulmonology following for ventilator weaning (phase 2)         Recurrent right middle bronchus stenosis s/p dilation and stent (6/20/2024), stable  Has history (bronchoscopy 2/15/24) demonstrating narrowing of right mainstem transplant anastomosis and bronchus intermedius. CT chest (6/18/2024) and bronchoscopy  (6/19/2024) demonstrated occlusion of right middle bronchus. With concern for post-obstructive pneumonia, interventional pulmonology was consulted, and completed bronchoscopy (6/20/2024) with tissue debulking, right middle bronchus balloon dilation to 11 mm, stent placement in right main bronchus, and bifurcating cast placement in right upper bronchus. IP pulm re-evaluated bronchial stents with BAL and noted that they were open.    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   - Prospera (7/3) 2.26  - DSA (7/3) negative  - PTA nebs  - Fluticasone-viilanterol (breo ellipta) every day - HOLD with respiratory infection  - Montelukast every day   - Immunosuppressants per Tx Pulm:   -Tacrolimus 1mg BID  - Prednisone 40mg PO every day (baseline chronic 5mg Daily)  -azathioprine - HOLD per Transplant pulmonology with repeat infections  - Peripheral smear (7/9) pending for pancytopenia  - Antibiotic prophylaxis               - Bactrim MWF for PJP ppx (monitor need to adjust pending HD plan)  - CMV weekly monitoring (qTuesday)  - Azithromycin 125mg per pulmonary, continue to monitor QTc  - Continue VGCV ppx (renally dosed, monitor need to adjust pending HD plan) with tentative plan for 3 weeks beyond completion of stress dose steroids   - Steroid Taper per Transplant Pulmonology as below:  Date Daily dose (mg)   7/11 50   7/25 45   8/8 40   8/22 35   9/5 30   9/19 25   10/17 20   11/14 15   12/12 10   1/9 5      - Hold PTA prednisone dose while completing steroid taper as above     HAP, Stenotrophomonas maltophilia, Enterococcus faecalis, Aspergillus, and Candida guilliermondi complex  Presented to the ED on 6/18/2024 with SOB and  hypercapnic respiratory failure. Found on bronchoscopy (6/19) to have RML obstruction by narrowing bronchus intermedius as well as copious mucopurulent secretions/mucus plugging. Previously treated for Stenotrophonomas/aspergillus pneumonia in 11/2023 with subsequent sputum culture (2/2024) negative for both Stenotrophonomas/Aspergillus. Etiology likely repeat Stenotrophomonas infection vs opportunistic infection from colonized microbe.   - Workup  - 6/18 sputum cx: Stenotrophomonas maltophilia (ceftazidime and levofloxacin R)  - 6/19 BAL cx: Stenotrophomonas maltophilia, Enterococcus faecalis (gentamicin R), Aspergillus (speciation in process)  - 6/21 BAL cx: Stenotrophomonas maltophilia and Enterococcus faecalis VRE  - 6/24 sputum cx: Stenotrophomonas maltophilia, Enterococcus faecalis VRE, and Candida guilliermondi complex  - Transplant ID recommendations prior to LTACH transfer:               - Continue posaconazole 300 mg/day   - Continue VGCV prophylaxis  - Weekly CMV VL (Tuesdays, next 7/9), last was 39 (7/2)         - Azithromycin per pulmonary (holding due to QTC, but will follow up)  - TMP/SMX SS daily for PJP PPx (for life given h/o PJP)  - Awaiting susceptibilities on Aspergillus per transplant ID  - Present antibiotics               - Continue posaconazole 300 mg/day per pulm transplant (6/25 to present) for candida guillermondii and asergillus ochraceus on prior BAL  - BAL fluid (7/3): pink, hazy, cell counts normal range, fluid sent for viral, bacterial and fungal cultures negative to date, cytology negative for malignancy and organisms; preliminary AFB negative     Hx Aspergillus PNA (11/2023)  Hx PJP PNA (1/2021)  Hx CMV viremia, recurrent  Hx EBV viremia  - Transplant ID consult, appreciate recs  PTA posaconazole (Treatment for hx of aspergillus PNA)  PTA azithromycin 250mg qd (CLAD ppx) (holding)  PTA bactrim (PJP ppx)  -CMV PCR 7/16 positive but <35     ESRD on iHD  (T,Th,Sat)  Hypokalemia  History of ESRD 2/2 Tacrolimus toxicity on HD (MWF). With soft blood pressures, placed RIJ (6/20/2024) and started CRRT (6/20/2024). US of AVF 7/5: arterial inflow patent without inflow stenosis, normal diameter of anastamosis, venous outflow elevated velocity at subclavian- suggestive of recurrent stenosis in venous outflow (area of prior angioplasty in March). IR consulted who noted that IR fistulogram not required at that time, and was able to run iHD on beginning on 7/6 without issue.   - Nephrology consulted and following for ESRD, patient tolerating 3x weekly HD at time of discharge  - Renally-dosed medications  - BMP daily  - HD T/ Th/ Sa schedule now  -replete electrolytes as needed      Anxiety  Insomnia   Patient has been having anxiety regarding pressure support trials and weaning from the ventilator. Health Psychology consulted and followed patient weekly while hospitalized. Attempts with seroquel control limited symptomatically as well as by QTc prolongation. Benzodiazepine titration/long acting calculations ongoing.  -ativan 0.5mg PO q6h  -ativan 1mg PO q4h\prn   -ramelteon 8 mg at bedtime  -trazadone 50mg at bedtime        Toxic metabolic encephalopathy, resolved  Patient noted to have lethargy in setting of sepsis and delirium in the setting of high-dose steroid therapy which improved with management of underlying sepsis. Patient mental status returned to baseline at day of discharge.   -ativan 0.5mg PO q6h  -ativan 1mg PO q4h\prn   -ramelteon 8 mg at bedtime  -trazadone 50mg at bedtime   - Delirium precautions  - Do not disturb order overnight to promote sleep     Septic shock, resolved  On 6/19/2024, developed sepsis in the setting of stenotrophomonas pneumonia/enterococcus faecium VRE UTI. Resolved.     Demand Ischemia, resolved    Presented with elevated troponin (peak 499). Not associated with chest pain or hypoxia. EKG without ST elevations/depressions or T wave  inversions. Suspect demand ischemia in the setting of sepsis. Will continue to monitor symptoms and continuous telemetry. EKG 7/2 sinus rhythm with PACs.     Paroxysmal A-Fib, improved  Pulmonary Hypertension   Transient arhythmia with palpitations  History of pulmonary hypertension (45 mmHg, echo 10/2023) in the setting of ILD and paroxysmal afib on PTA metoprolol tartrate BID. Not on anticoagulation for afib. Transient unspecified arhythmia overnight (7/9) with palpitations. EKG 7/9 sinus rhythm with fusion complexes and known RBBB. Repeat EKG 7/10 showing sinus rhythm with no RBBB or fusion complexes. Patient given magnesium 1g and 20mEq K+ for K 3.3 this am. No further episodes of palpitations. Currently in sinus rhythm.  - metoprolol tartrate 25mg BID     Prolonged Qtc, resolved  Repeat EKG 7/13 with QTC of 483.   - Azithromycin ppx restarted per pulm transplant  -Qtc 476 (7/17)  -Daily EKG        Severe protein calorie deficiency  PEG-J placed by IR on 7/5, continue tube feeds.   - Nutrition consulted  - Tube feeds 30ml/hr; at goal     Diarrhea, resolved  On 6/21, had 8 bowel movements. Occurred in the setting of scheduled bowel regimen and starting new antibiotics (meropenem, levofloxacin). C diff negative.   - Holding PRN bowel regimen for loose stools  - Miralax PRN   - Senna prn  - formula changes made per nutrition     Cholelithiasis with gallbladder wall thickening, resolved  During admission patient found to have up trending LFTs and fever in setting of GB wall thickening on CT abdomen/pelvis, now resolved. MRCP completed showing borderline dilated CBD up to 1.1 cm, no significant intrahepatic biliary dilation, no evidence of choledocholithiasis, no evidence of acute cholecystitis.    hyperglycemia with high dose steroids  Stress dose steroids discontinued, resume PTA prednisone 5 mg daily. Blood sugars have remained in appropriate range.  - lantus 5 unit(s) qam  -sliding scale insulin         Pyuria,  Enterococcus faecium VRE and  ESBL E. Coli   Asymptomatic. With progressive IV pressor needs, obtained broad infectious workup which demonstrated UCx (6/19/2024) with Enterococcus faecium VR and ESBL E. Coli. S/p Daptomycin (6/21-6/23) and Meropenem (6/21-6/24).        Acute on Chronic Normocytic Anemia, stable  Thrombocytopenia, chronic, improving  History of chronic anemia in the setting of kidney disease (baseline Hgb 10-11). Upon admission, Hgb 9. May be bone marrow suppression in the setting of chronic illness; potential contribution by blood loss with CRRT filter changes (~150-200c). Peripheral smear (6/18/2024) without evidence of schistocytes.      FOLLOW UPS NEEDED  -pulmonology for dedicated bronchoscopy for stent visualisation (anticipated 8/15)      ANTIBIOTICS COURSES:              - s/p micafungin 150 mg/day (6/25-7/9)  - s/p IV minocycline 100mg BD x 14 days total (6/20-7/5)- for stenotrophamonas  - PO linezolid 600 mg BID x 10 days (6/25-7/5)- for VRE in urine and BAL cultures  - S/p ceftazidime (6/25-6/28)               - S/p vancomycin (6/18-6/20)              - S/p zosyn (6/18-21)  - S/p Daptomycin (6/21-6/23)  - S/p Meropenem (6/21-6/24)  - S/p Levofloxacin (6/21-6/23)        Diet: NPO per Anesthesia Guidelines for Procedure/Surgery Except for: Meds, Ice Chips, NPO but receiving Tube Feeding  Adult Formula Drip Feeding: Continuous [place] Renal Support; Jejunostomy; Goal Rate: 40; mL/hr; Once current bag runs out, start new formula at 30 mL/hr, increase to goal rate after 8 hours    DVT Prophylaxis: Heparin SQ  Basilio Catheter: Not present  Lines: None     Cardiac Monitoring: None  Code Status: No CPR- Pre-arrest intubation OK      Clinically Significant Risk Factors              # Hypoalbuminemia: Lowest albumin = 2.8 g/dL at 7/22/2024  6:31 AM, will monitor as appropriate                 #Precipitous drop in Hgb/Hct: Lowest Hgb this hospitalization: 7.1 g/dL. Will continue to monitor and  treat/transfuse as appropriate.      # Severe Malnutrition: based on nutrition assessment    # Financial/Environmental Concerns: none         Disposition Plan     Medically Ready for Discharge: Anticipated in 5+ Days             Casa Chaudhari MD  Hospitalist Service  LTACH  Securely message with [employer] (more info)  Text page via [facility] Paging/Directory   ______________________________________________________________________    Interval History   No overnight events. Tolerated 2hrs of trach capping yesterday, dedicated conversation regarding goals of care, plan to address with palliative medicine (likely questions regarding hospice and possible transferring home if possible).    More comfortable with adjustments to anxiolytic regimen.    Physical Exam   Vital Signs: Temp: 99  F (37.2  C) Temp src: Axillary BP: 101/57 Pulse: 81   Resp: 22 SpO2: 99 % O2 Device: Mechanical Ventilator Oxygen Delivery: 3 LPM  Weight: 118 lbs 6.19 oz    General:  No acute distress, cachectic , temporal muscle wasting  Eyes:  Sclera non icteric, normal conjuctiva  Neck :  Supple, no lymphadenopathy, trach in place   Lungs: Rhonchi noted in all lung fields   CVS:  S1 and S2, regular rate and rhythem  Abdomen:  Soft, nontender, PEG in place   Musculoskeletal:  No pain or swelling.  No edema  Neuro:  Alert and oriented.  No acute deficit     Medical Decision Making       50 MINUTES SPENT BY ME on the date of service doing chart review, history, exam, documentation & further activities per the note.  MANAGEMENT DISCUSSED with the following over the past 24 hours: patient, spouse   NOTE(S)/MEDICAL RECORDS REVIEWED over the past 24 hours: RN notes, consultant notes, RT notes  Tests ORDERED & REVIEWED in the past 24 hours:  - See lab/imaging results included in the data section of the note      Data   Imaging results reviewed over the past 24 hrs:   No results found for this or any previous visit (from the past 24 hour(s)).    Most Recent 3  CBC's:  Recent Labs   Lab Test 07/27/24  0633 07/26/24  0635 07/25/24 2001 07/25/24  0642   WBC 3.8* 4.0  --  3.8*   HGB 8.1* 7.9* 8.4* 7.6*   * 109*  --  109*    183  --  157     Most Recent 3 BMP's:  Recent Labs   Lab Test 07/28/24  0504 07/27/24  2337 07/27/24  1657 07/25/24  2349 07/25/24 2001 07/23/24  0617 07/23/24  0554 07/22/24  1244 07/22/24  0900 07/22/24  0631 07/20/24  1209 07/20/24  0650 07/19/24  1125 07/19/24  0728   NA  --   --   --   --  133*  --  138  --  135 134*   < > 133*  --  133*   POTASSIUM  --   --   --   --  4.7  --  3.3*  --  3.6 3.8   < > 4.4  --  3.8   CHLORIDE  --   --   --   --   --   --   --   --   --  96*  --  93*  --  96*   CO2  --   --   --   --   --   --   --   --   --  28  --  27  --  28   BUN  --   --   --   --   --   --   --   --   --  54.1*  --  61.6*  --  35.0*   CR  --   --   --   --   --   --   --   --   --  2.11*  --  2.49*  --  1.80*   ANIONGAP  --   --   --   --   --   --   --   --   --  10  --  13  --  9   CHELSEY  --   --   --   --   --   --   --   --   --  8.5*  --  8.6*  --  8.6*   * 182* 176*   < > 204*   < > 156*   < > 176* 199*   < > 200*   < > 90    < > = values in this interval not displayed.     Most Recent 2 LFT's:  Recent Labs   Lab Test 07/22/24  0631 07/20/24  0650   AST 25 41   ALT 38 61*   ALKPHOS 287* 474*   BILITOTAL 0.5 0.6     Most Recent 3 INR's:  Recent Labs   Lab Test 06/18/24  1418 03/05/24  0925 02/14/24  1050   INR 0.98 0.98 0.96

## 2024-07-28 NOTE — PLAN OF CARE
Problem: Mechanical Ventilation Invasive  Goal: Optimal Device Function  Outcome: Progressing  Intervention: Optimize Device Care and Function  Recent Flowsheet Documentation  Taken 7/27/2024 1953 by Ryan Moore RT  Airway Safety Measures:   all equipment/monitors on and audible   manual resuscitator/mask/valve at bedside   oxygen flowmeter   suction at bedside   suction equipment   suction regulator  Goal: Mechanical Ventilation Liberation  Outcome: Progressing  Goal: Absence of Device-Related Skin and Tissue Injury  Outcome: Progressing  Goal: Absence of Ventilator-Induced Lung Injury  Outcome: Progressing  RT PROGRESS NOTE     DATA:     CURRENT SETTINGS:  18/360/+5             TRACH TYPE / SIZE: #6 Shiley TG (Placed 7/8)             MODE:   AC             FIO2:   25%     ACTION:             THERAPIES: Xopenex QID/Muco BID/Saline BID             SUCTION:  Yes                         FREQUENCY: 2x                        AMOUNT:  Small to Moderate                        CONSISTENCY: Thin                        COLOR:    Pale yellow             SPONTANEOUS COUGH EFFORT/STRENGTH OF EFFORT (not elicited by suctioning):                               WEANING PHASE:   3                        WEAN MODE:  Capped with 3 L NC                        WEAN TIME:   Per day shift RT, pt only capped 2 hours and 7 minutes                        END WEAN REASON: Pt requested to go back on vent     RESPONSE:             BS:   Coarse             VITAL SIGNS: Sat: %    HR: 74-82   RR: 21-29              EMOTIONAL NEEDS / CONCERNS:  None                RISK FOR SELF DECANNULATION:  None                        RISK DUE TO:                          INTERVENTION/S IN PLACE IS/ARE:         NOTE / PLAN:       Pt remains on full vent support and tolerating well.  Continue current care plan.

## 2024-07-28 NOTE — PLAN OF CARE
Problem: Anxiety Signs/Symptoms  Goal: Optimized Energy Level (Anxiety Signs/Symptoms)  Outcome: Progressing     Problem: Pain Acute  Goal: Optimal Pain Control and Function  Outcome: Progressing  Intervention: Develop Pain Management Plan  Recent Flowsheet Documentation  Taken 7/28/2024 0849 by Veda Yeboah RN  Pain Management Interventions: (tylenol 650 mg given) medication (see MAR)     Problem: Enteral Nutrition  Goal: Absence of Aspiration Signs and Symptoms  Outcome: Progressing  Goal: Safe, Effective Therapy Delivery  Outcome: Progressing   Goal Outcome Evaluation:    Patient reported a 5/10 pain on the right arm at the start of shift (tylenol 650 mg was given at 0849 with a minimal effect when re-assessed initially-no sign of pain noted as the shift progressed. Patient was calm per observation at the start of shift too-reported anxiety and requested for anti-anxiety medication even though patient had schedule ativan at 0608 (was given as needed ativan 1 mg at 0848 with some relieve. Patient later called for another dose of ativan-was given schedule ativan at the time (1205). Per report, patient was not compliant with cares (hygiene), compliant with medications though. Blood pressure was 101/57, temperature 99 (axillary), respiration 22 at 0750, was 114/58 at 0847 prior to given coreg this morning. Patient will have dialysis in am plus labs (see chart for these), BG was 177 at 1152, buttock and perineum noted to be denuded in am shift (prn Calmoseptine ointment was applied, miconazole powder applied to groins

## 2024-07-28 NOTE — PLAN OF CARE
Problem: Mechanical Ventilation Invasive  Goal: Optimal Device Function  Outcome: Progressing  Goal: Mechanical Ventilation Liberation  Outcome: Progressing  Goal: Absence of Device-Related Skin and Tissue Injury  Outcome: Progressing  Goal: Absence of Ventilator-Induced Lung Injury  Outcome: Progressing   Goal Outcome Evaluation:       RT PROGRESS NOTE     DATA:     CURRENT SETTINGS: AC 18, 360, +5, 25%             TRACH TYPE / SIZE:#6 SHILEY TaperGuard Placed on 7/8/24             MODE:AC             FIO2:25%     ACTION:             THERAPIES: Xopenex QID, Mucomyst BID, Sodium Chloride Neb BID                SUCTION:                           FREQUENCY: X  x3                        AMOUNT: Small to Moderate                         CONSISTENCY: thick                        COLOR: white/pale yellow              SPONTANEOUS COUGH EFFORT/STRENGTH OF EFFORT (not elicited by suctioning): fair                              WEANING PHASE:3                        WEAN MODE: Trach Capping days as [place]                        WEAN TIME: None - Patient declined  weaning  today                        END WEAN REASON:      RESPONSE:             BS:  Coarse             VITAL SIGNS:Sat %, HR 74-80, RR 21-29             EMOTIONAL NEEDS / CONCERNS: Anxiety, Anxious                 RISK FOR SELF DECANNULATION: no                        RISK DUE TO: no                        INTERVENTION/S IN PLACE IS/ARE:         NOTE / PLAN: Cont. Current plan of care/ Patient remains on AC- no wean today

## 2024-07-28 NOTE — PLAN OF CARE
Problem: Adult Inpatient Plan of Care  Goal: Absence of Hospital-Acquired Illness or Injury  Intervention: Prevent Infection  Recent Flowsheet Documentation  Taken 7/27/2024 1900 by Pankaj Bustamante RN  Infection Prevention: personal protective equipment utilized  Goal: Optimal Comfort and Wellbeing  Intervention: Provide Person-Centered Care  Recent Flowsheet Documentation  Taken 7/27/2024 1900 by Pankaj Bustamante RN  Trust Relationship/Rapport: care explained     Problem: Mechanical Ventilation Invasive  Goal: Effective Communication  Intervention: Ensure Effective Communication  Recent Flowsheet Documentation  Taken 7/27/2024 1900 by Pankaj Bustamante RN  Communication Enhancement Strategies: call light answered in person  Family/Support System Care: support provided  Trust Relationship/Rapport: care explained  Goal: Optimal Device Function  Intervention: Optimize Device Care and Function  Recent Flowsheet Documentation  Taken 7/27/2024 1900 by Pankaj Bustamante RN  Airway Safety Measures: all equipment/monitors on and audible  Goal: Mechanical Ventilation Liberation  Intervention: Promote Extubation and Mechanical Ventilation Liberation  Recent Flowsheet Documentation  Taken 7/27/2024 1900 by Pankaj Bustamante RN  Environmental Support: calm environment promoted     Problem: Anxiety Signs/Symptoms  Goal: Improved Mood Symptoms (Anxiety Signs/Symptoms)  Intervention: Optimize Emotion and Mood  Recent Flowsheet Documentation  Taken 7/27/2024 1900 by Pankaj Bustamante RN  Supportive Measures:   active listening utilized   relaxation techniques promoted   positive reinforcement provided  Goal: Enhanced Social, Occupational or Functional Skills (Anxiety Signs/Symptoms)  Intervention: Promote Social, Occupational and Functional Ability  Recent Flowsheet Documentation  Taken 7/27/2024 1900 by Pankaj Bustamante RN  Social Functional Ability Promotion: self-expression encouraged  Trust  Relationship/Rapport: care explained  Goal: Improved Somatic Symptoms (Anxiety Signs/Symptoms)  Intervention: Minimize Somatic Disturbance  Recent Flowsheet Documentation  Taken 7/27/2024 1900 by Pankaj Bustamante RN  Complementary Therapy: (TV) other (see comments)     Problem: Pain Acute  Goal: Optimal Pain Control and Function  Intervention: Prevent or Manage Pain  Recent Flowsheet Documentation  Taken 7/27/2024 1900 by Pankaj Bustamante RN  Sensory Stimulation Regulation: television on  Complementary Therapy: (TV) other (see comments)  Intervention: Optimize Psychosocial Wellbeing  Recent Flowsheet Documentation  Taken 7/27/2024 1900 by Pankaj Bustamante RN  Supportive Measures:   active listening utilized   relaxation techniques promoted   positive reinforcement provided     Problem: Wound  Goal: Optimal Coping  Intervention: Support Patient and Family Response  Recent Flowsheet Documentation  Taken 7/27/2024 1900 by Pankaj Bustamante RN  Supportive Measures:   active listening utilized   relaxation techniques promoted   positive reinforcement provided  Family/Support System Care: support provided  Goal: Absence of Infection Signs and Symptoms  Intervention: Prevent or Manage Infection  Recent Flowsheet Documentation  Taken 7/27/2024 1900 by Pankaj Bustamante RN  Isolation Precautions: contact precautions maintained  Goal: Optimal Pain Control and Function  Intervention: Prevent or Manage Pain  Recent Flowsheet Documentation  Taken 7/27/2024 1900 by Pankaj Bustamante RN  Complementary Therapy: (TV) other (see comments)     Problem: Hemodialysis  Goal: Absence of Infection Signs and Symptoms  Intervention: Prevent or Manage Infection  Recent Flowsheet Documentation  Taken 7/27/2024 1900 by Pankaj Bustamante RN  Infection Prevention: personal protective equipment utilized   Goal Outcome Evaluation:       Patient is alert and oriented x 4, vital signs are stable. /62 (BP Location: Right arm,  Patient Position: Semi-Cobian's, Cuff Size: Adult Small)   Pulse 78   Temp 98.4  F (36.9  C) (Oral)   Resp 29   Wt 51.6 kg (113 lb 12.1 oz)   LMP 06/07/2014 (Exact Date)   SpO2 100%   BMI 20.15 kg/m      The patient is unable to verbalize their needs due to the vent, denies pain , and remained calm and cooperative throughout the shift. PRN lorazepam given aroun 2200 was effectivwe. 2 large loose BMS continue to monitoe.  Pankaj Bustamante RN on 7/27/2024 at 11:56 PM

## 2024-07-28 NOTE — PLAN OF CARE
Problem: Pain Acute  Goal: Optimal Pain Control and Function  Outcome: Progressing  Intervention: Prevent or Manage Pain  Recent Flowsheet Documentation  Taken 7/28/2024 0236 by Geraldine Salazar RN  Sensory Stimulation Regulation: television on  Medication Review/Management: medications reviewed  Intervention: Optimize Psychosocial Wellbeing  Recent Flowsheet Documentation  Taken 7/28/2024 0236 by Geraldine Salazar RN  Supportive Measures: active listening utilized   Goal Outcome Evaluation:  Patient is alert and oriented. On full vent support. Anxiety not noted during the night. Slept most of the night. BS q6h. Insulin given accdg to sliding scale. No concerns during the shift. All needs attended.

## 2024-07-29 NOTE — PHARMACY-IMMUNOSUPPRESSION MONITORING
Tacrolimus level = 7.8, drawn 7/29 @ 0635.   Last dose given 7/28 @ 1746.   This is a 12.5-hour level.  Current dose: 1mg bid  Tacrolimus  goal: 8-10 per transplant team.  New or change in medications with potentially significant interactions with  Tacrolimus: None  Recommendations from transplant team, coordinator, Mikayla Latham: no change  Orders placed  : N/A    Emeli Heaton RP,BCCCP, BCCP

## 2024-07-29 NOTE — CONSULTS
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 Hospital course complicated by hypotension, delirium, and prolonged respiratory failure.  She has a tracheostomy placement on 7/3 and PEG-J tube placement with IR on 7/5.      Patient arrived on LTACH unit on 7/18 in severe respiratory distress. She complained of nausea, chest pressure, and dsypnea. She is tachypneic and breathing at 41 breaths per minute. She is tripoding and pulse ox reading in low 80's, FiO2 on vent was increased to 50%. She has severe rhonchi and rales in all lung fields, worst in RUL. She was suctioned twice and minimal secretions noted and was given xopenex. Her rhochi and rales of L lung field improved, but not the RUL. She is having severe anxiety and was given 0.5 mg of ativan IVP. She is complaining of chest pressure and tachycardic to 133. EKG done and revealed an incomplete right bundle branch block, no ST-T wave changes in contiguous leads. BP is 215/91- given 10mg of IV hydralazine, BP decreased to 185/99. Spoke with Dr. Sanches and he accepted pt back to [facility].  On 7/19, pt has improved and vitals are now stable and has been transferred back to [place]    Assessment:      Areas visualized during today's visit:  left foot    Wound location: left second toe    Last photo: 7/29  Wound due to: Unknown Etiology  Wound history/plan of care: Nursing reported 7/26.  Patient had head to toes  assessment completed 7/22 and this was not noted.   Patient did not transfer out of bed between LTACH admission and trip to outside facility 7/25.  It appears as a pinch injury, may have occurred during transfers or during visit on 7/25.   Will monitor at this time.  Wound base: intact blood blister     Palpation of the wound bed: boggy      Drainage: none     Description of drainage: none     Measurements (length x width x depth, in cm): 0.7x0.7cm      Tunneling: N/A     Undermining: N/A  Periwound skin: Intact      Color: normal and consistent with surrounding tissue      Temperature: normal   Odor: none  Pain: no grimacing or signs of discomfort  Treatment goal: Heal   STATUS: initial assessment         Treatment Plan:     Leave open to air     Orders:  None at this time    RECOMMEND PRIMARY TEAM ORDER: None, at this time  Education provided: Not appropriate today   Discussed plan of care with: Patient and Nurse  WOC nurse follow-up plan: weekly  Notify WOC if wound(s) deteriorate.  Nursing to notify the Provider(s) and re-consult the WOC Nurse if new skin concern.    DATA:     Current support surface: Standard  Low air loss (JUSTYNA pump, Isolibrium, Pulsate)  BMI: Body mass index is 20.82 kg/m .   Active diet order: Orders Placed This Encounter      NPO per Anesthesia Guidelines for Procedure/Surgery Except for: Meds, Ice Chips, NPO but receiving Tube Feeding     Output: I/O last 3 completed shifts:  In: 1140 [I.V.:3; NG/GT:1137]  Out: -      Labs:   Recent Labs   Lab 07/29/24  0635   ALBUMIN 2.6*   HGB 7.4*   WBC 2.5*     Pressure injury risk assessment:   Sensory Perception: 3-->slightly limited  Moisture: 3-->occasionally moist  Activity: 2-->chairfast  Mobility: 3-->slightly limited  Nutrition: 3-->adequate  Friction and Shear: 2-->potential problem  Marcus Score: 16    LOYDA CostaN, RN, PHN, HNB-BC, CWOCN  Pager no longer is use, please contact through [employer] group: [facility]  [facility]

## 2024-07-29 NOTE — PROGRESS NOTES
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 Hospital course complicated by hypotension, delirium, and prolonged respiratory failure.  She has a tracheostomy placement on 7/3 and PEG-J tube placement with IR on 7/5.      Patient arrived on LTACH unit on 7/18 in severe respiratory distress. She complained of nausea, chest pressure, and dsypnea. She is tachypneic and breathing at 41 breaths per minute. She is tripoding and pulse ox reading in low 80's, FiO2 on vent was increased to 50%. She has severe rhonchi and rales in all lung fields, worst in RUL. She was suctioned twice and minimal secretions noted and was given xopenex. Her rhochi and rales of L lung field improved, but not the RUL. She is having severe anxiety and was given 0.5 mg of ativan IVP. She is complaining of chest pressure and tachycardic to 133. EKG done and revealed an incomplete right bundle branch block, no ST-T wave changes in contiguous leads. BP is 215/91- given 10mg of IV hydralazine, BP decreased to 185/99. Spoke with Dr. Sanches and he accepted pt back to [facility].  On 7/19, pt has improved and vitals are now stable and has been transferred back to [place].    LTACH course:  7/20-7/22: +anxiety, has seroquel as first line and ativan as second line prn.  Not sleeping well and not tolerating weaning well.  Added lantus 5 unit(s) for hyperglycemia.  Increased seroquel to 25 q8h  7/23-7/28:  Met with palliative medicine per  limited ventilator weaning. Code status updated to DNR/I OK; requesting improved anxiety control and aware this may continue to limit ventilator weaning attempts. Pancytopenia withOUT absolute neutropenia. Kecia indicating that she's tired, further explained that she is now ready for hospice. Family aware, would like to have dedicated discussion of hospice vs. Comfort care, as they would like to consider getting her to home; meeting with palliative medicine again on 7/30.    7/29: No medical changes today. HD. Palliative tomorrow?    Saturday conversation summary: Nasreen Woodruff, and Evelyn have had many conversations about hospice before, but acknowledge that this hospitalisation is different from before. While Kecia has not made a definitive decision on her specific goals of care (as to a change), they feel like they are mostly ready for whatever she chooses, and had questions about getting Kecia home should she chooses hospice care.    Assessment & Plan      Acute on chronic hypoxic hypercapnic respiratory failure, improving  HAP, Stenotrophomonas maltophilia, Enterococcus faecalis, and Candida guilliermondi complex  Severe pulmonary edema  Acute respiratory distress syndrome, resolved  Presented to the ED on 6/18/2024 with acute on chronic hypoxic hypercapnic respiratory failure. Transferred to MICU and intubated on overnight on 6/18. Workup significant for Stenotrophomonas, Enterococcus, and Candida pneumonia. Course was c/b progression to ARDS (6/21-22). Tracheostomy placed on 7/3 due to inability to wean from ventilator.  - Pulmonary toilet  - Mucomyst BID  - Hypertonic saline BID, alternate with mucomyst  - Albuterol neb QID  - Antibiotics as below  -pulmonology following for ventilator weaning (phase 2)    Anxiety  Insomnia   Patient has been having anxiety regarding pressure support trials and weaning from the ventilator. Health Psychology consulted and followed patient weekly while hospitalized. Attempts  with seroquel control limited symptomatically as well as by QTc prolongation. Benzodiazepine titration/long acting calculations ongoing.  - escitalopram 10mg daily  -ativan 0.5mg PO q6h  -ativan 1mg PO q4h\prn   -ramelteon 8 mg at bedtime  -trazadone 50mg at bedtime      Recurrent right middle bronchus stenosis s/p dilation and stent (6/20/2024), stable  Has history (bronchoscopy 2/15/24) demonstrating narrowing of right mainstem transplant anastomosis and bronchus intermedius. CT chest (6/18/2024) and bronchoscopy (6/19/2024) demonstrated occlusion of right middle bronchus. With concern for post-obstructive pneumonia, interventional pulmonology was consulted, and completed bronchoscopy (6/20/2024) with tissue debulking, right middle bronchus balloon dilation to 11 mm, stent placement in right main bronchus, and bifurcating cast placement in right upper bronchus. IP pulm re-evaluated bronchial stents with BAL and noted that they were open.    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   - Prospera (7/3) 2.26  - DSA (7/3) negative  - PTA nebs  - Fluticasone-viilanterol (breo ellipta) every day - HOLD with respiratory infection  - Montelukast every day   - Immunosuppressants per Tx Pulm:   -Tacrolimus 1mg BID  - Prednisone 40mg PO every day (baseline chronic 5mg Daily)  -azathioprine - HOLD per Transplant pulmonology with repeat infections  - Peripheral smear (7/9) pending for pancytopenia  - Antibiotic prophylaxis               - Bactrim MWF for PJP ppx (monitor need to adjust pending HD plan)  - CMV weekly monitoring (qTuesday)  - Azithromycin 125mg per pulmonary, continue to monitor QTc  - Continue VGCV ppx (renally dosed,  monitor need to adjust pending HD plan) with tentative plan for 3 weeks beyond completion of stress dose steroids   - Steroid Taper per Transplant Pulmonology as below:  Date Daily dose (mg)   7/11 50   7/25 45   8/8 40   8/22 35   9/5 30   9/19 25   10/17 20   11/14 15   12/12 10   1/9 5      - Hold PTA prednisone dose while completing steroid taper as above     HAP, Stenotrophomonas maltophilia, Enterococcus faecalis, Aspergillus, and Candida guilliermondi complex  Presented to the ED on 6/18/2024 with SOB and hypercapnic respiratory failure. Found on bronchoscopy (6/19) to have RML obstruction by narrowing bronchus intermedius as well as copious mucopurulent secretions/mucus plugging. Previously treated for Stenotrophonomas/aspergillus pneumonia in 11/2023 with subsequent sputum culture (2/2024) negative for both Stenotrophonomas/Aspergillus. Etiology likely repeat Stenotrophomonas infection vs opportunistic infection from colonized microbe.   - Workup  - 6/18 sputum cx: Stenotrophomonas maltophilia (ceftazidime and levofloxacin R)  - 6/19 BAL cx: Stenotrophomonas maltophilia, Enterococcus faecalis (gentamicin R), Aspergillus (speciation in process)  - 6/21 BAL cx: Stenotrophomonas maltophilia and Enterococcus faecalis VRE  - 6/24 sputum cx: Stenotrophomonas maltophilia, Enterococcus faecalis VRE, and Candida guilliermondi complex  - Transplant ID recommendations prior to LTACH transfer:               - Continue posaconazole 300 mg/day   - Continue VGCV prophylaxis  - Weekly CMV VL (Tuesdays, next 7/9), last was 39 (7/2)         - Azithromycin per pulmonary (holding due to QTC, but will follow up)  - TMP/SMX SS daily for PJP PPx (for life given h/o PJP)  - Awaiting susceptibilities on Aspergillus per transplant ID  - Present antibiotics               - Continue posaconazole 300 mg/day per pulm transplant (6/25 to present) for candida guillermondii and asergillus ochraceus on prior BAL  - BAL fluid (7/3): pink,  hazy, cell counts normal range, fluid sent for viral, bacterial and fungal cultures negative to date, cytology negative for malignancy and organisms; preliminary AFB negative     Hx Aspergillus PNA (11/2023)  Hx PJP PNA (1/2021)  Hx CMV viremia, recurrent  Hx EBV viremia  - Transplant ID consult, appreciate recs  PTA posaconazole (Treatment for hx of aspergillus PNA)  PTA azithromycin 250mg qd (CLAD ppx) (holding)  PTA bactrim (PJP ppx)  -CMV PCR 7/16 positive but <35     ESRD on iHD (T,Th,Sat)  Hypokalemia  History of ESRD 2/2 Tacrolimus toxicity on HD (MWF). With soft blood pressures, placed RIJ (6/20/2024) and started CRRT (6/20/2024). US of AVF 7/5: arterial inflow patent without inflow stenosis, normal diameter of anastamosis, venous outflow elevated velocity at subclavian- suggestive of recurrent stenosis in venous outflow (area of prior angioplasty in March). IR consulted who noted that IR fistulogram not required at that time, and was able to run iHD on beginning on 7/6 without issue.   - Nephrology consulted and following for ESRD, patient tolerating 3x weekly HD at time of discharge  - Renally-dosed medications  - BMP daily  - HD T/ Th/ Sa schedule now  -replete electrolytes as needed         Toxic metabolic encephalopathy, resolved  Patient noted to have lethargy in setting of sepsis and delirium in the setting of high-dose steroid therapy which improved with management of underlying sepsis. Patient mental status returned to baseline at day of discharge.   -ativan 0.5mg PO q6h  -ativan 1mg PO q4h\prn   -ramelteon 8 mg at bedtime  -trazadone 50mg at bedtime   - Delirium precautions  - Do not disturb order overnight to promote sleep     Septic shock, resolved  On 6/19/2024, developed sepsis in the setting of stenotrophomonas pneumonia/enterococcus faecium VRE UTI. Resolved.     Demand Ischemia, resolved    Presented with elevated troponin (peak 499). Not associated with chest pain or hypoxia. EKG without ST  elevations/depressions or T wave inversions. Suspect demand ischemia in the setting of sepsis. Will continue to monitor symptoms and continuous telemetry. EKG 7/2 sinus rhythm with PACs.     Paroxysmal A-Fib, improved  Pulmonary Hypertension   Transient arhythmia with palpitations  History of pulmonary hypertension (45 mmHg, echo 10/2023) in the setting of ILD and paroxysmal afib on PTA metoprolol tartrate BID. Not on anticoagulation for afib. Transient unspecified arhythmia overnight (7/9) with palpitations. EKG 7/9 sinus rhythm with fusion complexes and known RBBB. Repeat EKG 7/10 showing sinus rhythm with no RBBB or fusion complexes. Patient given magnesium 1g and 20mEq K+ for K 3.3 this am. No further episodes of palpitations. Currently in sinus rhythm.  - metoprolol tartrate 25mg BID     Prolonged Qtc, resolved  Repeat EKG 7/13 with QTC of 483.   - Azithromycin ppx restarted per pulm transplant  -Qtc 476 (7/17)  -Daily EKG        Severe protein calorie deficiency  PEG-J placed by IR on 7/5, continue tube feeds.   - Nutrition consulted  - Tube feeds 30ml/hr; at goal     Diarrhea, resolved  On 6/21, had 8 bowel movements. Occurred in the setting of scheduled bowel regimen and starting new antibiotics (meropenem, levofloxacin). C diff negative.   - Holding PRN bowel regimen for loose stools  - Miralax PRN   - Senna prn  - formula changes made per nutrition     Cholelithiasis with gallbladder wall thickening, resolved  During admission patient found to have up trending LFTs and fever in setting of GB wall thickening on CT abdomen/pelvis, now resolved. MRCP completed showing borderline dilated CBD up to 1.1 cm, no significant intrahepatic biliary dilation, no evidence of choledocholithiasis, no evidence of acute cholecystitis.    hyperglycemia with high dose steroids  Stress dose steroids discontinued, resume PTA prednisone 5 mg daily. Blood sugars have remained in appropriate range.  - lantus 10 unit(s)  qam  -sliding scale insulin         Pyuria, Enterococcus faecium VRE and  ESBL E. Coli   Asymptomatic. With progressive IV pressor needs, obtained broad infectious workup which demonstrated UCx (6/19/2024) with Enterococcus faecium VR and ESBL E. Coli. S/p Daptomycin (6/21-6/23) and Meropenem (6/21-6/24).        Acute on Chronic Normocytic Anemia, stable  Thrombocytopenia, chronic, improving  History of chronic anemia in the setting of kidney disease (baseline Hgb 10-11). Upon admission, Hgb 9. May be bone marrow suppression in the setting of chronic illness; potential contribution by blood loss with CRRT filter changes (~150-200c). Peripheral smear (6/18/2024) without evidence of schistocytes.      FOLLOW UPS NEEDED  -pulmonology for dedicated bronchoscopy for stent visualisation (anticipated 8/15)      ANTIBIOTICS COURSES:              - s/p micafungin 150 mg/day (6/25-7/9)  - s/p IV minocycline 100mg BD x 14 days total (6/20-7/5)- for stenotrophamonas  - PO linezolid 600 mg BID x 10 days (6/25-7/5)- for VRE in urine and BAL cultures  - S/p ceftazidime (6/25-6/28)               - S/p vancomycin (6/18-6/20)              - S/p zosyn (6/18-21)  - S/p Daptomycin (6/21-6/23)  - S/p Meropenem (6/21-6/24)  - S/p Levofloxacin (6/21-6/23)        Diet: NPO per Anesthesia Guidelines for Procedure/Surgery Except for: Meds, Ice Chips, NPO but receiving Tube Feeding  Adult Formula Drip Feeding: Continuous [place] Renal Support; Jejunostomy; Goal Rate: 40; mL/hr; Once current bag runs out, start new formula at 30 mL/hr, increase to goal rate after 8 hours    DVT Prophylaxis: Heparin SQ  Basilio Catheter: Not present  Lines: None     Cardiac Monitoring: None  Code Status: No CPR- Pre-arrest intubation OK      Clinically Significant Risk Factors              # Hypoalbuminemia: Lowest albumin = 2.8 g/dL at 7/22/2024  6:31 AM, will monitor as appropriate                 #Precipitous drop in Hgb/Hct: Lowest Hgb this hospitalization:  7.1 g/dL. Will continue to monitor and treat/transfuse as appropriate.      # Severe Malnutrition: based on nutrition assessment    # Financial/Environmental Concerns: none         Disposition Plan     Medically Ready for Discharge: Anticipated in 5+ Days             Zeus Benedict MD  Hospitalist Service  LTACH  Securely message with [facility] (more info)  Text page via [facility] Paging/Directory   ______________________________________________________________________    Interval History   - no acute events ovn  - pt resting comfortably in bed on HD and watching the olympics  - discussed palliative meeting this week for hospice, pt hoping to be able to go home with hospice  - anxiety seems stable per pt presently  - no other acute concerns    Physical Exam   Vital Signs: Temp: 98.3  F (36.8  C) Temp src: Oral BP: 105/59 Pulse: 77   Resp: 16 SpO2: 99 % O2 Device: Mechanical Ventilator    Weight: 117 lbs 8.08 oz    General:  No acute distress, cachectic , temporal muscle wasting  Eyes:  Sclera non icteric, normal conjuctiva  Neck :  Supple, no lymphadenopathy, trach in place   Lungs: Rhonchi noted in all lung fields   CVS:  S1 and S2, regular rate and rhythem  Abdomen:  Soft, nontender, PEG in place   Musculoskeletal:  No pain or swelling.  No edema  Neuro:  Alert and oriented.  No acute deficit     Medical Decision Making       50 MINUTES SPENT BY ME on the date of service doing chart review, history, exam, documentation & further activities per the note.  MANAGEMENT DISCUSSED with the following over the past 24 hours:     NOTE(S)/MEDICAL RECORDS REVIEWED over the past 24 hours:    Tests ORDERED & REVIEWED in the past 24 hours:  - See lab/imaging results included in the data section of the note      Data   Imaging results reviewed over the past 24 hrs:   No results found for this or any previous visit (from the past 24 hour(s)).    Most Recent 3 CBC's:  Recent Labs   Lab Test 07/29/24  0635 07/27/24  0633 07/26/24  0635    WBC 2.5* 3.8* 4.0   HGB 7.4* 8.1* 7.9*   * 110* 109*    202 183     Most Recent 3 BMP's:  Recent Labs   Lab Test 07/29/24  0727 07/29/24  0516 07/29/24  0017 07/25/24  2349 07/25/24 2001 07/23/24  0617 07/23/24  0554 07/22/24  1244 07/22/24  0900 07/22/24  0631 07/20/24  1209 07/20/24  0650 07/19/24  1125 07/19/24  0728   NA  --   --   --   --  133*  --  138  --  135 134*   < > 133*  --  133*   POTASSIUM  --   --   --   --  4.7  --  3.3*  --  3.6 3.8   < > 4.4  --  3.8   CHLORIDE  --   --   --   --   --   --   --   --   --  96*  --  93*  --  96*   CO2  --   --   --   --   --   --   --   --   --  28  --  27  --  28   BUN  --   --   --   --   --   --   --   --   --  54.1*  --  61.6*  --  35.0*   CR  --   --   --   --   --   --   --   --   --  2.11*  --  2.49*  --  1.80*   ANIONGAP  --   --   --   --   --   --   --   --   --  10  --  13  --  9   CHELSEY  --   --   --   --   --   --   --   --   --  8.5*  --  8.6*  --  8.6*   * 169* 225*   < > 204*   < > 156*   < > 176* 199*   < > 200*   < > 90    < > = values in this interval not displayed.     Most Recent 2 LFT's:  Recent Labs   Lab Test 07/22/24  0631 07/20/24  0650   AST 25 41   ALT 38 61*   ALKPHOS 287* 474*   BILITOTAL 0.5 0.6     Most Recent 3 INR's:  Recent Labs   Lab Test 06/18/24  1418 03/05/24  0925 02/14/24  1050   INR 0.98 0.98 0.96

## 2024-07-29 NOTE — PLAN OF CARE
Problem: Mechanical Ventilation Invasive  Goal: Optimal Device Function  Outcome: Progressing  Goal: Mechanical Ventilation Liberation  Outcome: Progressing  Goal: Absence of Device-Related Skin and Tissue Injury  Outcome: Progressing  Goal: Absence of Ventilator-Induced Lung Injury  Outcome: Progressing   Goal Outcome Evaluation:       RT PROGRESS NOTE     DATA:     CURRENT SETTINGS: AC 18, 360, +5, 25%             TRACH TYPE / SIZE:#6 SHIDHAVAL TaperGuard Placed on 7/8/24             MODE:AC             FIO2:25%     ACTION:             THERAPIES: Xopenex QID, Mucomyst BID, Sodium Chloride Neb BID                SUCTION:                           FREQUENCY: X 5                        AMOUNT: Small to Moderate                         CONSISTENCY: thick                        COLOR: white/Pale yellow              SPONTANEOUS COUGH EFFORT/STRENGTH OF EFFORT (not elicited by suctioning): fair                              WEANING PHASE:3                        WEAN MODE: Trach Capping days as [place]                        WEAN TIME: none                        END WEAN REASON:      RESPONSE:             BS: Coarse             VITAL SIGNS:Sat %, HR 78-96, RR 18-22             EMOTIONAL NEEDS / CONCERNS: Anxiety, Anxious                 RISK FOR SELF DECANNULATION: no                        RISK DUE TO: no                        INTERVENTION/S IN PLACE IS/ARE:         NOTE / PLAN: Cont. Current plan of care/ Patient declined wean /trach capping- due to  fatigue post dialysis

## 2024-07-29 NOTE — PLAN OF CARE
Problem: Mechanical Ventilation Invasive  Goal: Effective Communication  Intervention: Ensure Effective Communication  Recent Flowsheet Documentation  Taken 7/29/2024 0952 by Tayla Stanford RN  Communication Enhancement Strategies: call light answered in person  Family/Support System Care: support provided  Trust Relationship/Rapport:   care explained   questions answered   questions encouraged   reassurance provided  Goal: Optimal Device Function  Intervention: Optimize Device Care and Function  Recent Flowsheet Documentation  Taken 7/29/2024 0952 by Tayla Stanford RN  Airway Safety Measures: all equipment/monitors on and audible  Airway/Ventilation Management: airway patency maintained  Goal: Mechanical Ventilation Liberation  Intervention: Promote Extubation and Mechanical Ventilation Liberation  Recent Flowsheet Documentation  Taken 7/29/2024 0952 by Tayla Stanford RN  Medication Review/Management: medications reviewed  Environmental Support:   calm environment promoted   comfort object encouraged  Goal: Absence of Device-Related Skin and Tissue Injury  Intervention: Maintain Skin and Tissue Health  Recent Flowsheet Documentation  Taken 7/29/2024 0952 by Tayla Stanford RN  Device Skin Pressure Protection: absorbent pad utilized/changed  Goal: Absence of Ventilator-Induced Lung Injury  Intervention: Facilitate Lung-Protection Measures  Recent Flowsheet Documentation  Taken 7/29/2024 0952 by Tayla Stanford RN  Lung Protection Measures: fluid excess minimized     Problem: Pain Acute  Goal: Optimal Pain Control and Function  Intervention: Prevent or Manage Pain  Recent Flowsheet Documentation  Taken 7/29/2024 0952 by Tayla Stanford RN  Sensory Stimulation Regulation: television on  Medication Review/Management: medications reviewed  Intervention: Optimize Psychosocial Wellbeing  Recent Flowsheet Documentation  Taken 7/29/2024 0952 by Tayla Stanford RN  Supportive Measures:    active listening utilized   positive reinforcement provided     Problem: Hemodialysis  Goal: Safe, Effective Therapy Delivery  Intervention: Optimize Device Care and Function  Recent Flowsheet Documentation  Taken 7/29/2024 2404 by Tayla Stanford, RN  Medication Review/Management: medications reviewed  Pt denies pain or discomfort but c/o anxiety, ativan 1 mg given @ 1000 with effect. Pt on mechanical vent support. Pt's spouse and other family members visiting at bed side. Supportive to pt. Pt having dialysis at bed side at this time. Pt refused meralex d/t loose BM,  will continue monitoring.

## 2024-07-29 NOTE — PROGRESS NOTES
Pulmonary Progress Note    Admit Date: 7/19/2024  CODE: No CPR- Pre-arrest intubation OK    Assessment/Plan:     Problems:  Acute on chronic hypoxic and hypercapnic respiratory failure suspect d/t post obstructive multi-lobar pneumonia 2/2 right bronchus stenosis s/p stent RMB to BI & bifurcating stent in RUL. Completed antibiotics. S/p trach on prolonged mechanical ventilation. Remains on minimal vent settings. Poor weaning with PS and TM largely d/t anxiety so attempting to wean via trach capping during the day with improved success thus far.   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 EBV 1680 and log 3.2 on 7/22  ESRD on iHD  Anxiety with panic attacks limiting weaning potential: Seroquel. Lexapro started 7/25. PRN ativan & Seroquel(hasn't been utilized)  Pancytopenia: worsening 7/22 s/p Neupagen x1 7/23: Med related? On VGCV as above. Accelerated prednisone taper. CMV still <35 w log 1.5 so continuing VGCV. Counts stable today    Plan/Recommendations:  Repeat EBV PCR this week  Low threshold for blood cultures if showing signs of sepsis  Phase 3 vent wean pathway: Cap days as tolerated. AC at night. Pt presently wanting FT vent support  family discussing hospice  per Pt's , pt willing to try over the next fews days off vent to see if she can   is tearful, this is a difficult situation  CxR weekly for now, today's is pending    Steroid taper: Currenlty prednisone 40mg daily(decrease to 35mg daily on 8/7)  Nebs: levalbuterol QID, Mucomyst BID alternating with HTS for stent   PTA Singulair   Azithromycin 125 mg daily (resumed 7/18 at lower dose, prior held 7/10 for prolonged Qtc; Qtc 438 on 7/26)  Tacro levels qMTh - pharmacy and transplant to adjust dose accordingly. 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 The patient was admitted on 6/18/24 for progressive hypoxia with dyspnea and worsening hypercapnia in ED requiring intubation likely d/t post-obstructive PNA 2/2 right bronchus stenosis. S/p IP bronch (6/20) with laser tissue debulking RMB stenosis, airway balloon dilation of RMB and BI, and placement of stent RMB to BI and bifurcating stent in RUL. S/p CRRT (6/20-7/2), iHD resumed 7/4. Recurrence of paroxysmal afib with RVR first noted 6/25. S/p trach with ENT on 7/3 and GJ tube with IR on 7/5. Treating empirically for possible immune/inflammatory process vs organizing pneumonia (given GG changes on CT) with steroid burst/taper. Gradual improvement in PST on ventilator. She was discharged to [facility] 7/18/024 in the afternoon but was readmitted to MICU the same evening for suspected acute on chronic hypoxic respiratory failure, but likely exacerbated by severe anxiety episode. Transferred back to  "[facility] 7/19.     Subjective: in bed with trach capped on 2L NC with SpO2 100\"%.  Denies sob, pain, n/v. +anxiety despite resting comfortably.   at bedside.     Ventilator weaning results:  7/17: PST 3hr  7/18: PS 10 for total 7hr   7/19: no wean, new admit   7/20: Passed BDT, wore in-line PMV 17min. PS 12 for 50 min(stopped per pt request)  7/21: PS 12 for 1hr 40min(tachypnea, pt request to stop)  7/22: 50L/50%TM/PMV for 35min(stopped d/t anxiety)  7/23: PS 15 for 12min, then 15min of TM/PMV(pt requesting to stop d/t anxiety)  7/24: Capped 1hr 15min   7/25: Capped 2hr   7/28: on vent 24/7    Tracheal secretions: suctioned 5x in past 24hr for scant to moderate amounts, thick     Clinical status discussed today with respiratory therapist    ROS: 10-system review obtained and negative with the exception of the symptoms noted above.    Medications:     Current Facility-Administered Medications   Medication Dose Route Frequency Provider Last Rate Last Admin    dextrose 10% infusion   Intravenous Continuous PRN Dulce Hardy MD 30 mL/hr at 07/25/24 0009 1,000 mL at 07/25/24 0009    Patient is already receiving anticoagulation with heparin, enoxaparin (LOVENOX), warfarin (COUMADIN)  or other anticoagulant medication   Does not apply Continuous PRN Dulce Hardy MD        Stop Heparin 60 minutes before end of treatment   Does not apply Continuous PRN Ioana Ho MD         Current Facility-Administered Medications   Medication Dose Route Frequency Provider Last Rate Last Admin    acetylcysteine (MUCOMYST) 20 % nebulizer solution 2 mL  2 mL Nebulization BID Eyad Harding APRN CNP   2 mL at 07/29/24 0751    azithromycin (ZITHROMAX) suspension 125 mg  125 mg Per J Tube Daily Dulce Hardy MD   125 mg at 07/29/24 0844    B and C vitamin Complex with folic acid (NEPHRONEX) liquid 5 mL  5 mL Per Feeding Tube Daily Dulce Hardy MD   5 mL at 07/29/24 0844    carvedilol (COREG) tablet " 6.25 mg  6.25 mg Per Feeding Tube BID Casa Chaudhari MD   6.25 mg at 07/29/24 0844    chlorhexidine (PERIDEX) 0.12 % solution 15 mL  15 mL Mouth/Throat Q12H Dulce Hardy MD   15 mL at 07/29/24 0844    epoetin alisha-epbx (RETACRIT) injection 7,000 Units  7,000 Units Intravenous Once per day on Monday Wednesday Friday Chalo Aldridge PA-C        escitalopram (LEXAPRO) solution 10 mg  10 mg Per Feeding Tube Daily HardingEyad quintero APRN CNP   10 mg at 07/29/24 0844    fiber modular (BANATROL TF) packet 1 packet  1 packet Per Feeding Tube TID Dulce Hardy MD   1 packet at 07/29/24 0843    heparin ANTICOAGULANT injection 5,000 Units  5,000 Units Subcutaneous Q8H Dulce Hardy MD   5,000 Units at 07/29/24 0615    insulin aspart (NovoLOG) injection (RAPID ACTING)  1-12 Units Subcutaneous Q6H Dulce Hardy MD   2 Units at 07/29/24 0518    insulin glargine (LANTUS PEN) injection 10 Units  10 Units Subcutaneous QAM AC Casa Chaudhari MD   10 Units at 07/29/24 0633    levalbuterol (XOPENEX) neb solution 0.63 mg  0.63 mg Nebulization 4x Daily Eyad Harding APRN CNP   0.63 mg at 07/29/24 0751    lidocaine (PF) (XYLOCAINE) 1 % injection 5 mL  5 mL Subcutaneous During Dialysis/CRRT (from stock) Chalo Aldridge PA-C        LORazepam (ATIVAN) 2 MG/ML (HIGH CONC) oral solution 0.5 mg  0.5 mg Per G Tube Q6H JULES Casa Chaudhari MD   0.5 mg at 07/29/24 0615    montelukast (SINGULAIR) tablet 10 mg  10 mg Per J Tube At Bedtime Dulce Hardy MD   10 mg at 07/28/24 2127    pantoprazole (PROTONIX) 2 mg/mL suspension 40 mg  40 mg Per J Tube At Bedtime Dulce Hardy MD   40 mg at 07/28/24 2127    polyethylene glycol (MIRALAX) Packet 17 g  17 g Per Feeding Tube Daily Dulce Hardy MD   17 g at 07/28/24 0850    posaconazole (NOXAFIL) DR tablet TBEC 300 mg  300 mg Per Feeding Tube QAM Dulce Hardy MD   300 mg at 07/28/24 1321    predniSONE (DELTASONE) tablet 40 mg  40 mg Per  J Tube Daily Eyad Harding APRN CNP   40 mg at 07/29/24 0844    Prosource TF20 ENfit Compatibl EN LIQD (PROSOURCE TF20) packet 60 mL  1 packet Per Feeding Tube Daily Dulce Hardy MD   60 mL at 07/29/24 0843    ramelteon (ROZEREM) tablet 8 mg  8 mg Per J Tube At Bedtime Dulce Hardy MD   8 mg at 07/28/24 2127    sodium chloride (NEBUSAL) 3 % neb solution 3 mL  3 mL Nebulization 2 times daily Eyad Harding APRN CNP   3 mL at 07/28/24 1942    sodium chloride (PF) 0.9% PF flush 3 mL  3 mL Intracatheter Q8H Dulce Hardy MD   3 mL at 07/29/24 0615    sulfamethoxazole-trimethoprim (BACTRIM/SEPTRA) suspension 80 mg  10 mL Per Feeding Tube Once per day on Tuesday Thursday Saturday Dulce Hardy MD   80 mg at 07/27/24 2016    tacrolimus (GENERIC) suspension 1 mg  1 mg Per G Tube BID IS Dulce Hardy MD   1 mg at 07/29/24 0844    traZODone (DESYREL) tablet 50 mg  50 mg Per J Tube At Bedtime Casa Chaudhari MD   50 mg at 07/28/24 2127    valGANciclovir (VALCYTE) solution 450 mg  450 mg Per J Tube Once per day on Tuesday Saturday Dulce Hardy MD   450 mg at 07/27/24 2014    Vitamin D3 (CHOLECALCIFEROL) tablet 50 mcg  50 mcg Per J Tube Once per day on Monday Wednesday Friday Dulce Hardy MD   50 mcg at 07/29/24 0844         Exam/Data:   Vitals  /65   Pulse 79   Temp 97.9  F (36.6  C) (Oral)   Resp 16   Wt 53.3 kg (117 lb 8.1 oz)   LMP 06/07/2014 (Exact Date)   SpO2 100%   BMI 20.82 kg/m    BP - Mean:  [86] 86  I/O last 3 completed shifts:  In: 1362 [NG/GT:1362]  Out: -   Weight change: -0.4 kg (-14.1 oz)    Vent Mode: CMV/AC  FiO2 (%): 25 %  Resp Rate (Set): 18 breaths/min  Tidal Volume (Set, mL): 360 mL  PEEP (cm H2O): 5 cmH2O  Resp: 16      EXAM:  Gen: No acute distress on AC  looks tired  HEENT: NT, trach midline/intact  CV: RRR, no m/g/r  Resp: coarse on R, clear on L; non-labored; no wheeze   Abd: soft, nontender, BS+  Skin: no visible rashes or  lesions  Ext: no edema  Neuro: alert, follows commands     Labs:  Complete Blood Count   Recent Labs   Lab 07/29/24  0635 07/27/24  0633 07/26/24  0635 07/25/24 2001 07/25/24  0642   WBC 2.5* 3.8* 4.0  --  3.8*   HGB 7.4* 8.1* 7.9* 8.4* 7.6*    202 183  --  157     Basic Metabolic Panel  Recent Labs   Lab 07/29/24  0727 07/29/24  0635 07/29/24  0516 07/29/24  0017 07/25/24  2349 07/25/24 2001 07/23/24  0617 07/23/24  0554   NA  --  135  --   --   --  133*  --  138   POTASSIUM  --  3.6  --   --   --  4.7  --  3.3*   CHLORIDE  --  96*  --   --   --   --   --   --    CO2  --  27  --   --   --   --   --   --    BUN  --  86.4*  --   --   --   --   --   --    CR  --  2.61*  --   --   --   --   --   --    * 177* 169* 225*   < > 204*   < > 156*    < > = values in this interval not displayed.     Liver Function Tests  Recent Labs   Lab 07/29/24  0635   AST 29   ALT 37   ALKPHOS 382*   BILITOTAL 0.4   ALBUMIN 2.6*     Venous Blood Gas  Recent Labs   Lab 07/25/24 2001 07/23/24  0554   PHV 7.32 7.41   PCO2V 59* 48   PO2V 33 38   HCO3V 27 29*   LASHAUN 4.6* 5.7*       Radiology: Personally reviewed; radiology read below    XR CHEST PORT 7/23/2024  Tracheostomy tube remains in place. Stable postoperative changes of sternotomy, right thoracotomy and hilar stent placement. Heart size is within normal limits. Triangular opacity in the inferomedial left hemithorax is unchanged. Mixed interstitial and airspace opacity in the left lower lung field is similar to previous. No new focal consolidation. No pleural effusion or pneumothorax. Gastrostomy in place.    XR CHEST PORT 1 VIEW 7/18/2024   Frontal view of the chest. Stable tracheostomy tube. Stable right-sided bronchial stents. Prior sternotomy. Stable heart size. Midline trachea. Improved aeration throughout the left lung. No pneumothorax. Right chest wall/pectoral surgical clips.                                                      Chest CT  7/17/2024                                                                 IMPRESSION:   1. Overall improved appearance of the confluent groundglass opacities  primarily affecting the bilateral lower lobes. There is a more  appreciable improvement in the right middle lobe and lingula with  relative sparing of the apices.   2. Status post bilateral lung transplantation with stable support  devices.  3. Cholelithiasis.  4. Ectatic ascending aorta measuring up to 4.0 cm.  5. Atherosclerosis.    ADRIÁN Powell CNP   Pulmonary Medicine  [facility]  Office: [phone]

## 2024-07-29 NOTE — PLAN OF CARE
Problem: Mechanical Ventilation Invasive  Goal: Optimal Device Function  Outcome: Progressing  Intervention: Optimize Device Care and Function  Recent Flowsheet Documentation  Taken 7/28/2024 1942 by Ryan Moore RT  Airway Safety Measures:   all equipment/monitors on and audible   manual resuscitator/mask/valve at bedside   oxygen flowmeter   suction at bedside   suction equipment   suction regulator  Goal: Mechanical Ventilation Liberation  Outcome: Progressing  Goal: Absence of Device-Related Skin and Tissue Injury  Outcome: Progressing  Goal: Absence of Ventilator-Induced Lung Injury  Outcome: Progressing   RT PROGRESS NOTE     DATA:     CURRENT SETTINGS:   18/360/+5             TRACH TYPE / SIZE: #6 Shiley TG (Placed 7/8)             MODE:   AC             FIO2:   25%     ACTION:             THERAPIES: Xopenex QID/Mucomyst BID/Sodium Chloride BID             SUCTION:  Yes                         FREQUENCY: 1x                        AMOUNT:    Small                        CONSISTENCY: Thick                        COLOR:    Pale yellow             SPONTANEOUS COUGH EFFORT/STRENGTH OF EFFORT (not elicited by suctioning): fair cough.                              WEANING PHASE:   3                        WEAN MODE:  Per day shift RT, patient declined weaning.                        WEAN TIME:                           END WEAN REASON:        RESPONSE:             BS:   Coarse             VITAL SIGNS: Sat: 95-99%    HR: 73-90    RR: 18-27             EMOTIONAL NEEDS / CONCERNS:  None                RISK FOR SELF DECANNULATION:  None                        RISK DUE TO:                          INTERVENTION/S IN PLACE IS/ARE:         NOTE / PLAN:       Pt remains on full vent support and tolerating well.  Continue current care plan.

## 2024-07-29 NOTE — PROGRESS NOTES
HEMODIALYSIS TREATMENT NOTE    Date: 7/29/2024  Time: 1659     Data:  Pre Wt: 53.3 kg (bed scale)  Desired Wt:   To be established  Post Wt:  51.0 kg   Weight change: -  2.3 kg  Ultrafiltration - Post Run Net Total Removed (mL):  2300 ml  Vascular Access Status: Fistula patent  Dialyzer Rinse:  Light  Total Blood Volume Processed: 70.4 L   Total Dialysis (Treatment) Time:   3.5 Hrs  Dialysate Bath: K 3, Ca 3  Heparin: Heparin: None     Lab:   No        Interventions:  Dialysis done through Left AV Fistula using 15 gauge needles. , . UF set to 2.3 Liters, accommodating priming and rinse back volumes. All meds administered per MAR. Family member at bed side. Treatment ended safely and  blood is rinsed back completely. Decannulation done post HD, hemostasis is achieved in 10 minutes. Pressure dressing is applied, to be removed after 4-6 hours. Post Tx assessment done. Patient remained in  [place] [id number] room in stable condition  Report given to Charge RN     Assessment:  A/O x 4, calm and cooperative, denies pain  Lung sounds course anterior and lateral BUL, Diminished BLL  left arm AV Fistula has good thrill and bruit                Plan:    Per Renal team    Pretty Trivedi, RN, BSN

## 2024-07-29 NOTE — PLAN OF CARE
Problem: Anxiety Signs/Symptoms  Goal: Optimized Energy Level (Anxiety Signs/Symptoms)  Outcome: Progressing   Goal Outcome Evaluation:  Patient is alert, mouthing words. On vent, No signs of Respiratory distress. BS q6h. Insulin given according to sliding scale. Anxiety not noted during the night. Slept most of the shift. All needs attended.

## 2024-07-29 NOTE — PROGRESS NOTES
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  Hemodialysis dependent since 2019.  Historically ran at [facility].  Has left upper extremity AVG with good reported function.  [facility]    ACD: Dosing EPO while inpatient.  Increasing dose to 7000 units 3 times weekly today.    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    HTN: Monotherapy with Coreg.  Blood pressure acceptable.    S/p bilateral lung transplant 2018: Immunosuppressed with tacrolimus, azathioprine, prednisone.  Infection prophylaxis Bactrim, azithromycin, valganciclovir renally dosed  Management per transplant pulmonologist    Malnutrition: Tube feeds    Per Palliative note 7/23 - plans for Tues 7/30 for family to meet with palliative to discuss goals of care. Pt wishes for family to \"all be on the same pag     SUBJECTIVE:    Patient seen bedside  Patient's  is present during encounter  No concerns related to her dialysis treatments  Minimal UF needs on HD  Cannulation discomfort much better since receiving lidocaine  Discussed plans for continuing MWF HD schedule while at [facility]  OBJECTIVE:  Physical Exam   Temp: 98.3  F (36.8  C) Temp src: Oral BP: 105/59 Pulse: 81   Resp: 16 SpO2: 100 % O2 Device: Mechanical Ventilator    Vitals:    07/25/24 0509 07/28/24 0614 07/29/24 0509   Weight: 51.6 kg (113 lb 12.1 oz) 53.7 kg (118 lb 6.2 oz) 53.3 kg (117 lb 8.1 oz)     Vital Signs with Ranges  Temp:  [98.3  F (36.8  C)-98.6  F (37  C)] 98.3  F (36.8  C)  Pulse:  [73-90] 81  Resp:  [16-30] 16  BP: (105-131)/(59-68) 105/59  FiO2 (%):  [25 %] 25 %  SpO2:  [97 %-100 %] 100 %  I/O last 3 completed shifts:  In: 1362 [NG/GT:1362]  Out: -     Patient Vitals for the past 72 hrs:   Weight   07/29/24 0509 53.3 kg (117 lb 8.1 oz)   07/28/24 0614 53.7 kg (118 lb 6.2 oz)     Intake/Output Summary (Last 24 hours) at 7/29/2024 1050  Last data filed at 7/29/2024 0900  Gross per 24 hour "   Intake 1140 ml   Output --   Net 1140 ml       PHYSICAL EXAM:  General: Alert, NAD  Cardiovascular: Heart rate is controlled  Extremities: No peripheral edema  Gastrointestinal: Soft, non-tender, non-distended  Access: Left upper aVF, thrill and bruit present    LABORATORY STUDIES:     Recent Labs   Lab 07/29/24 0635 07/27/24  0633 07/26/24  0635 07/25/24 2001 07/25/24  0642 07/24/24  0630 07/23/24  0701   WBC 2.5* 3.8* 4.0  --  3.8* 4.2 1.1*   RBC 2.21* 2.42* 2.27*  --  2.28* 2.32* 2.05*   HGB 7.4* 8.1* 7.9* 8.4* 7.6* 7.9* 7.1*   HCT 23.8* 26.7* 24.8*  --  24.8* 25.3* 22.4*    202 183  --  157 177 140*       Basic Metabolic Panel:  Recent Labs   Lab 07/29/24  0727 07/29/24  0635 07/29/24  0516 07/29/24  0017 07/28/24  1729 07/28/24  1152 07/25/24  2349 07/25/24 2001 07/23/24  0617 07/23/24  0554   NA  --  135  --   --   --   --   --  133*  --  138   POTASSIUM  --  3.6  --   --   --   --   --  4.7  --  3.3*   CHLORIDE  --  96*  --   --   --   --   --   --   --   --    CO2  --  27  --   --   --   --   --   --   --   --    BUN  --  86.4*  --   --   --   --   --   --   --   --    CR  --  2.61*  --   --   --   --   --   --   --   --    * 177* 169* 225* 252* 177*   < > 204*   < > 156*   CHELSEY  --  7.4*  --   --   --   --   --   --   --   --     < > = values in this interval not displayed.       INRNo lab results found in last 7 days.     Recent Labs   Lab Test 07/29/24 0635 07/27/24  0633 06/18/24  1803 06/18/24  1418 03/05/24  0925   INR  --   --   --  0.98 0.98   WBC 2.5* 3.8*   < > 4.5 1.0*   HGB 7.4* 8.1*   < > 12.1 10.5*    202   < > 147* 96*    < > = values in this interval not displayed.       Personally reviewed current labs    This note was dictated using voice recognition     Chalo Aldridge PA-C  [facility]  [phone]

## 2024-07-29 NOTE — PLAN OF CARE
Problem: Adult Inpatient Plan of Care  Goal: Optimal Comfort and Wellbeing  Outcome: Progressing  Intervention: Provide Person-Centered Care  Recent Flowsheet Documentation  Taken 7/28/2024 1800 by Janis Martin, RN  Trust Relationship/Rapport:   care explained   choices provided   questions answered    Pt VSS, respirations continue to be high. Alert and oriented x 4.   Complains of pain 0/10.  Denies nausea, dizziness, shortness of breath and lightheadedness. On vent.  Adequate intake and output.  On TF diet  . Last BM: today .  Incontinent of bowel and bladder. Skin looks denuded on bottom, continue to monitor.  Uses call light appropriately.  Mood acceptance,  handling hospitalization well.   Continue to monitor.     /68   Pulse 81   Temp 98.3  F (36.8  C) (Oral)   Resp 27   Wt 53.7 kg (118 lb 6.2 oz)   LMP 06/07/2014 (Exact Date)   SpO2 98%   BMI 20.97 kg/m       Janis Martin,   RN

## 2024-07-30 NOTE — PROGRESS NOTES
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 Hospital course complicated by hypotension, delirium, and prolonged respiratory failure.  She has a tracheostomy placement on 7/3 and PEG-J tube placement with IR on 7/5.      Patient arrived on LTACH unit on 7/18 in severe respiratory distress. She complained of nausea, chest pressure, and dsypnea. She is tachypneic and breathing at 41 breaths per minute. She is tripoding and pulse ox reading in low 80's, FiO2 on vent was increased to 50%. She has severe rhonchi and rales in all lung fields, worst in RUL. She was suctioned twice and minimal secretions noted and was given xopenex. Her rhochi and rales of L lung field improved, but not the RUL. She is having severe anxiety and was given 0.5 mg of ativan IVP. She is complaining of chest pressure and tachycardic to 133. EKG done and revealed an incomplete right bundle branch block, no ST-T wave changes in contiguous leads. BP is 215/91- given 10mg of IV hydralazine, BP decreased to 185/99. Spoke with Dr. Sanches and he accepted pt back to [facility].  On 7/19, pt has improved and vitals are now stable and has been transferred back to [place].    LTACH course:  7/20-7/22: +anxiety, has seroquel as first line and ativan as second line prn.  Not sleeping well and not tolerating weaning well.  Added lantus 5 unit(s) for hyperglycemia.  Increased seroquel to 25 q8h  7/23-7/28:  Met with palliative medicine per  limited ventilator weaning. Code status updated to DNR/I OK; requesting improved anxiety control and aware this may continue to limit ventilator weaning attempts. Pancytopenia withOUT absolute neutropenia. Kecia indicating that she's tired, further explained that she is now ready for hospice. Family aware, would like to have dedicated discussion of hospice vs. Comfort care, as they would like to consider getting her to home; meeting with palliative medicine again on 7/30.    7/29: No medical changes today. HD. Palliative tomorrow?  7/30: Palliative to meet with family today, will plan for comfort care later this week. Increased lorazepam to 1mg q6h scheduled and oxycodone/dilaudid for pain as needed. Further med changes as care progresses.     Saturday conversation summary: Nasreen Woodruff, and Evelyn have had many conversations about hospice before, but acknowledge that this hospitalisation is different from before. While Kecia has not made a definitive decision on her specific goals of care (as to a change), they feel like they are mostly ready for whatever she chooses, and had questions about getting Kecia home should she chooses hospice care.    Assessment & Plan      Acute on chronic hypoxic hypercapnic respiratory failure, improving  HAP, Stenotrophomonas maltophilia, Enterococcus faecalis, and Candida guilliermondi complex  Severe pulmonary edema  Acute respiratory distress syndrome, resolved  Presented to the ED on 6/18/2024 with acute on chronic hypoxic hypercapnic respiratory failure. Transferred to MICU and intubated on overnight on 6/18. Workup significant for Stenotrophomonas, Enterococcus, and Candida pneumonia. Course was c/b progression to ARDS (6/21-22). Tracheostomy placed on 7/3 due to inability to wean from ventilator.  - Pulmonary toilet  - Mucomyst BID  - Hypertonic saline BID, alternate with mucomyst  - Albuterol neb QID  - Antibiotics as below  -pulmonology following for ventilator weaning  (phase 2)    Anxiety  Insomnia   Patient has been having anxiety regarding pressure support trials and weaning from the ventilator. Health Psychology consulted and followed patient weekly while hospitalized. Attempts with seroquel control limited symptomatically as well as by QTc prolongation. Benzodiazepine titration/long acting calculations ongoing.  - escitalopram 10mg daily  -ativan 0.5mg PO q6h  -ativan 1mg PO q4h\prn   -ramelteon 8 mg at bedtime  -trazadone 50mg at bedtime      Recurrent right middle bronchus stenosis s/p dilation and stent (6/20/2024), stable  Has history (bronchoscopy 2/15/24) demonstrating narrowing of right mainstem transplant anastomosis and bronchus intermedius. CT chest (6/18/2024) and bronchoscopy (6/19/2024) demonstrated occlusion of right middle bronchus. With concern for post-obstructive pneumonia, interventional pulmonology was consulted, and completed bronchoscopy (6/20/2024) with tissue debulking, right middle bronchus balloon dilation to 11 mm, stent placement in right main bronchus, and bifurcating cast placement in right upper bronchus. IP pulm re-evaluated bronchial stents with BAL and noted that they were open.    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   - Prospera (7/3) 2.26  - DSA (7/3) negative  - PTA nebs  - Fluticasone-viilanterol (breo ellipta) every day - HOLD with respiratory infection  - Montelukast every day   - Immunosuppressants per Tx Pulm:   -Tacrolimus 1mg BID  - Prednisone 40mg PO every day (baseline chronic 5mg Daily)  -azathioprine - HOLD per Transplant pulmonology with repeat infections  - Peripheral smear (7/9) pending for pancytopenia  - Antibiotic  prophylaxis               - Bactrim MWF for PJP ppx (monitor need to adjust pending HD plan)  - CMV weekly monitoring (qTuesday)  - Azithromycin 125mg per pulmonary, continue to monitor QTc  - Continue VGCV ppx (renally dosed, monitor need to adjust pending HD plan) with tentative plan for 3 weeks beyond completion of stress dose steroids   - Steroid Taper per Transplant Pulmonology as below:  Date Daily dose (mg)   7/11 50   7/25 45   8/8 40   8/22 35   9/5 30   9/19 25   10/17 20   11/14 15   12/12 10   1/9 5      - Hold PTA prednisone dose while completing steroid taper as above     HAP, Stenotrophomonas maltophilia, Enterococcus faecalis, Aspergillus, and Candida guilliermondi complex  Presented to the ED on 6/18/2024 with SOB and hypercapnic respiratory failure. Found on bronchoscopy (6/19) to have RML obstruction by narrowing bronchus intermedius as well as copious mucopurulent secretions/mucus plugging. Previously treated for Stenotrophonomas/aspergillus pneumonia in 11/2023 with subsequent sputum culture (2/2024) negative for both Stenotrophonomas/Aspergillus. Etiology likely repeat Stenotrophomonas infection vs opportunistic infection from colonized microbe.   - Workup  - 6/18 sputum cx: Stenotrophomonas maltophilia (ceftazidime and levofloxacin R)  - 6/19 BAL cx: Stenotrophomonas maltophilia, Enterococcus faecalis (gentamicin R), Aspergillus (speciation in process)  - 6/21 BAL cx: Stenotrophomonas maltophilia and Enterococcus faecalis VRE  - 6/24 sputum cx: Stenotrophomonas maltophilia, Enterococcus faecalis VRE, and Candida guilliermondi complex  - Transplant ID recommendations prior to LTACH transfer:               - Continue posaconazole 300 mg/day   - Continue VGCV prophylaxis  - Weekly CMV VL (Tuesdays, next 7/9), last was 39 (7/2)         - Azithromycin per pulmonary (holding due to QTC, but will follow up)  - TMP/SMX SS daily for PJP PPx (for life given h/o PJP)  - Awaiting susceptibilities on  Aspergillus per transplant ID  - Present antibiotics               - Continue posaconazole 300 mg/day per pulm transplant (6/25 to present) for candida guillermondii and asergillus ochraceus on prior BAL  - BAL fluid (7/3): pink, hazy, cell counts normal range, fluid sent for viral, bacterial and fungal cultures negative to date, cytology negative for malignancy and organisms; preliminary AFB negative     Hx Aspergillus PNA (11/2023)  Hx PJP PNA (1/2021)  Hx CMV viremia, recurrent  Hx EBV viremia  - Transplant ID consult, appreciate recs  PTA posaconazole (Treatment for hx of aspergillus PNA)  PTA azithromycin 250mg qd (CLAD ppx) (holding)  PTA bactrim (PJP ppx)  -CMV PCR 7/16 positive but <35     ESRD on iHD (T,Th,Sat)  Hypokalemia  History of ESRD 2/2 Tacrolimus toxicity on HD (MWF). With soft blood pressures, placed RIJ (6/20/2024) and started CRRT (6/20/2024). US of AVF 7/5: arterial inflow patent without inflow stenosis, normal diameter of anastamosis, venous outflow elevated velocity at subclavian- suggestive of recurrent stenosis in venous outflow (area of prior angioplasty in March). IR consulted who noted that IR fistulogram not required at that time, and was able to run iHD on beginning on 7/6 without issue.   - Nephrology consulted and following for ESRD, patient tolerating 3x weekly HD at time of discharge  - Renally-dosed medications  - BMP daily  - HD T/ Th/ Sa schedule now  -replete electrolytes as needed         Toxic metabolic encephalopathy, resolved  Patient noted to have lethargy in setting of sepsis and delirium in the setting of high-dose steroid therapy which improved with management of underlying sepsis. Patient mental status returned to baseline at day of discharge.   -ativan 0.5mg PO q6h  -ativan 1mg PO q4h\prn   -ramelteon 8 mg at bedtime  -trazadone 50mg at bedtime   - Delirium precautions  - Do not disturb order overnight to promote sleep     Septic shock, resolved  On 6/19/2024,  developed sepsis in the setting of stenotrophomonas pneumonia/enterococcus faecium VRE UTI. Resolved.     Demand Ischemia, resolved    Presented with elevated troponin (peak 499). Not associated with chest pain or hypoxia. EKG without ST elevations/depressions or T wave inversions. Suspect demand ischemia in the setting of sepsis. Will continue to monitor symptoms and continuous telemetry. EKG 7/2 sinus rhythm with PACs.     Paroxysmal A-Fib, improved  Pulmonary Hypertension   Transient arhythmia with palpitations  History of pulmonary hypertension (45 mmHg, echo 10/2023) in the setting of ILD and paroxysmal afib on PTA metoprolol tartrate BID. Not on anticoagulation for afib. Transient unspecified arhythmia overnight (7/9) with palpitations. EKG 7/9 sinus rhythm with fusion complexes and known RBBB. Repeat EKG 7/10 showing sinus rhythm with no RBBB or fusion complexes. Patient given magnesium 1g and 20mEq K+ for K 3.3 this am. No further episodes of palpitations. Currently in sinus rhythm.  - metoprolol tartrate 25mg BID     Prolonged Qtc, resolved  Repeat EKG 7/13 with QTC of 483.   - Azithromycin ppx restarted per pulm transplant  -Qtc 476 (7/17)  -Daily EKG        Severe protein calorie deficiency  PEG-J placed by IR on 7/5, continue tube feeds.   - Nutrition consulted  - Tube feeds 30ml/hr; at goal     Diarrhea, resolved  On 6/21, had 8 bowel movements. Occurred in the setting of scheduled bowel regimen and starting new antibiotics (meropenem, levofloxacin). C diff negative.   - Holding PRN bowel regimen for loose stools  - Miralax PRN   - Senna prn  - formula changes made per nutrition     Cholelithiasis with gallbladder wall thickening, resolved  During admission patient found to have up trending LFTs and fever in setting of GB wall thickening on CT abdomen/pelvis, now resolved. MRCP completed showing borderline dilated CBD up to 1.1 cm, no significant intrahepatic biliary dilation, no evidence of  choledocholithiasis, no evidence of acute cholecystitis.    hyperglycemia with high dose steroids  Stress dose steroids discontinued, resume PTA prednisone 5 mg daily. Blood sugars have remained in appropriate range.  - lantus 10 unit(s) qam  -sliding scale insulin         Pyuria, Enterococcus faecium VRE and  ESBL E. Coli   Asymptomatic. With progressive IV pressor needs, obtained broad infectious workup which demonstrated UCx (6/19/2024) with Enterococcus faecium VR and ESBL E. Coli. S/p Daptomycin (6/21-6/23) and Meropenem (6/21-6/24).        Acute on Chronic Normocytic Anemia, stable  Thrombocytopenia, chronic, improving  History of chronic anemia in the setting of kidney disease (baseline Hgb 10-11). Upon admission, Hgb 9. May be bone marrow suppression in the setting of chronic illness; potential contribution by blood loss with CRRT filter changes (~150-200c). Peripheral smear (6/18/2024) without evidence of schistocytes.      FOLLOW UPS NEEDED  -pulmonology for dedicated bronchoscopy for stent visualisation (anticipated 8/15)      ANTIBIOTICS COURSES:              - s/p micafungin 150 mg/day (6/25-7/9)  - s/p IV minocycline 100mg BD x 14 days total (6/20-7/5)- for stenotrophamonas  - PO linezolid 600 mg BID x 10 days (6/25-7/5)- for VRE in urine and BAL cultures  - S/p ceftazidime (6/25-6/28)               - S/p vancomycin (6/18-6/20)              - S/p zosyn (6/18-21)  - S/p Daptomycin (6/21-6/23)  - S/p Meropenem (6/21-6/24)  - S/p Levofloxacin (6/21-6/23)        Diet: NPO per Anesthesia Guidelines for Procedure/Surgery Except for: Meds, Ice Chips, NPO but receiving Tube Feeding  Adult Formula Drip Feeding: Continuous [place] Renal Support; Jejunostomy; Goal Rate: 40; mL/hr; Once current bag runs out, start new formula at 30 mL/hr, increase to goal rate after 8 hours    DVT Prophylaxis: Heparin SQ  Basilio Catheter: Not present  Lines: None     Cardiac Monitoring: None  Code Status: No CPR- Pre-arrest  intubation OK      Clinically Significant Risk Factors            # Hypomagnesemia: Lowest Mg = 1.6 mg/dL in last 2 days, will replace as needed   # Hypoalbuminemia: Lowest albumin = 2.6 g/dL at 7/29/2024  6:35 AM, will monitor as appropriate                 #Precipitous drop in Hgb/Hct: Lowest Hgb this hospitalization: 7.1 g/dL. Will continue to monitor and treat/transfuse as appropriate.      # Severe Malnutrition: based on nutrition assessment    # Financial/Environmental Concerns: none         Disposition Plan     Medically Ready for Discharge: Anticipated in 5+ Days             Zeus Benedict MD  Hospitalist Service  LTACH  Securely message with [employer] (more info)  Text page via [employer] Paging/Directory   ______________________________________________________________________    Interval History   - no acute events ovn  - pt resting comfortably in bed  - palliative to meet with family today about hospice  - no other acute concerns    Physical Exam   Vital Signs: Temp: 98.4  F (36.9  C) Temp src: Oral BP: 122/60 Pulse: 82   Resp: 24 SpO2: 100 % O2 Device: Mechanical Ventilator    Weight: 114 lbs 10.23 oz    General:  No acute distress, cachectic , temporal muscle wasting  Eyes:  Sclera non icteric, normal conjuctiva  Neck :  Supple, no lymphadenopathy, trach in place   Lungs: Rhonchi noted in all lung fields   CVS:  S1 and S2, regular rate and rhythem  Abdomen:  Soft, nontender, PEG in place   Musculoskeletal:  No pain or swelling.  No edema  Neuro:  Alert and oriented.  No acute deficit     Medical Decision Making       45 MINUTES SPENT BY ME on the date of service doing chart review, history, exam, documentation & further activities per the note.  MANAGEMENT DISCUSSED with the following over the past 24 hours:     NOTE(S)/MEDICAL RECORDS REVIEWED over the past 24 hours:    Tests ORDERED & REVIEWED in the past 24 hours:  - See lab/imaging results included in the data section of the note      Data   Imaging results  reviewed over the past 24 hrs:   Recent Results (from the past 24 hour(s))   XR Chest Port 1 View    Narrative    EXAM: XR CHEST PORT 1 VIEW  LOCATION: [facility]  DATE: 7/29/2024    INDICATION: s p bslt routine f u  COMPARISON: Chest radiograph 7/23/2024      Impression    IMPRESSION: Tracheostomy and right mainstem bronchus stent are unchanged. Slightly increased left lower lobe atelectasis with scattered right lung atelectasis. No enlarging pleural effusion with similar left posterior pleural calcification. Stable heart   size and mediastinal contours. Percutaneous gastrojejunostomy tube.       Most Recent 3 CBC's:  Recent Labs   Lab Test 07/29/24  0635 07/27/24  0633 07/26/24  0635   WBC 2.5* 3.8* 4.0   HGB 7.4* 8.1* 7.9*   * 110* 109*    202 183     Most Recent 3 BMP's:  Recent Labs   Lab Test 07/30/24  0553 07/29/24  2357 07/29/24  1735 07/29/24  0727 07/29/24  0635 07/25/24  2349 07/25/24 2001 07/23/24  0617 07/23/24  0554 07/22/24  0900 07/22/24  0631 07/20/24  1209 07/20/24  0650   NA  --   --   --   --  135  --  133*  --  138   < > 134*   < > 133*   POTASSIUM  --   --   --   --  3.6  --  4.7  --  3.3*   < > 3.8   < > 4.4   CHLORIDE  --   --   --   --  96*  --   --   --   --   --  96*  --  93*   CO2  --   --   --   --  27  --   --   --   --   --  28  --  27   BUN  --   --   --   --  86.4*  --   --   --   --   --  54.1*  --  61.6*   CR  --   --   --   --  2.61*  --   --   --   --   --  2.11*  --  2.49*   ANIONGAP  --   --   --   --  12  --   --   --   --   --  10  --  13   CHELSEY  --   --   --   --  7.4*  --   --   --   --   --  8.5*  --  8.6*   * 210* 181*   < > 177*   < > 204*   < > 156*   < > 199*   < > 200*    < > = values in this interval not displayed.     Most Recent 2 LFT's:  Recent Labs   Lab Test 07/29/24  0635 07/22/24  0631   AST 29 25   ALT 37 38   ALKPHOS 382* 287*   BILITOTAL 0.4 0.5     Most Recent 3 INR's:  Recent Labs   Lab Test 06/18/24  1418  03/05/24  0925 02/14/24  1050   INR 0.98 0.98 0.96

## 2024-07-30 NOTE — PLAN OF CARE
Problem: Mechanical Ventilation Invasive  Goal: Optimal Device Function  Outcome: Progressing  Goal: Mechanical Ventilation Liberation  Outcome: Progressing  Goal: Absence of Device-Related Skin and Tissue Injury  Outcome: Progressing  Goal: Absence of Ventilator-Induced Lung Injury  Outcome: Progressing   Goal Outcome Evaluation:       RT PROGRESS NOTE     DATA:     CURRENT SETTINGS: AC 18, 360, +5, 25%             TRACH TYPE / SIZE:#6 SHILEY TaperGuard Placed on 7/8/24             MODE:AC             FIO2:25%     ACTION:             THERAPIES: Xopenex QID, Mucomyst BID, Sodium Chloride Neb BID                SUCTION:                           FREQUENCY: X 4                        AMOUNT: Small to Moderate                         CONSISTENCY: thick                        COLOR: white /pale yellow              SPONTANEOUS COUGH EFFORT/STRENGTH OF EFFORT (not elicited by suctioning): fair                              WEANING PHASE:3                        WEAN MODE: Trach Capping days as [place]                        WEAN TIME:none                         END WEAN REASON:      RESPONSE:             BS: Coarse             VITAL SIGNS:Sat %, HR 79-82 , RR 22-31             EMOTIONAL NEEDS / CONCERNS: Anxiety, Anxious                 RISK FOR SELF DECANNULATION: no                        RISK DUE TO: no                        INTERVENTION/S IN PLACE IS/ARE:         NOTE / PLAN: Cont. Current plan of care/ Patient declined weaning /trach capping

## 2024-07-30 NOTE — PLAN OF CARE
Problem: Adult Inpatient Plan of Care  Goal: Absence of Hospital-Acquired Illness or Injury  Intervention: Identify and Manage Fall Risk  Recent Flowsheet Documentation  Taken 7/29/2024 1720 by Kerrie Cline RN  Safety Promotion/Fall Prevention: activity supervised  Intervention: Prevent Skin Injury  Recent Flowsheet Documentation  Taken 7/29/2024 1720 by Kerrie Cline RN  Skin Protection: incontinence pads utilized  Device Skin Pressure Protection: absorbent pad utilized/changed  Intervention: Prevent Infection  Recent Flowsheet Documentation  Taken 7/29/2024 1720 by Kerrie Cline RN  Infection Prevention:   cohorting utilized   personal protective equipment utilized  Goal: Optimal Comfort and Wellbeing  Intervention: Provide Person-Centered Care  Recent Flowsheet Documentation  Taken 7/29/2024 1720 by Kerrie Cline RN  Trust Relationship/Rapport:   care explained   questions answered   questions encouraged   reassurance provided     Problem: Mechanical Ventilation Invasive  Goal: Effective Communication  Intervention: Ensure Effective Communication  Recent Flowsheet Documentation  Taken 7/29/2024 1720 by Kerrie Cline RN  Communication Enhancement Strategies: call light answered in person  Family/Support System Care: support provided  Trust Relationship/Rapport:   care explained   questions answered   questions encouraged   reassurance provided  Goal: Optimal Device Function  Intervention: Optimize Device Care and Function  Recent Flowsheet Documentation  Taken 7/29/2024 1720 by Kerrie Cline RN  Airway Safety Measures: all equipment/monitors on and audible  Airway/Ventilation Management: airway patency maintained  Oral Care: swabbed with antiseptic solution  Goal: Mechanical Ventilation Liberation  Intervention: Promote Extubation and Mechanical Ventilation Liberation  Recent Flowsheet Documentation  Taken 7/29/2024 1720 by Kerrie Cline RN  Sleep/Rest  Enhancement:   family presence promoted   regular sleep/rest pattern promoted  Medication Review/Management: medications reviewed  Environmental Support:   calm environment promoted   comfort object encouraged  Goal: Absence of Device-Related Skin and Tissue Injury  Intervention: Maintain Skin and Tissue Health  Recent Flowsheet Documentation  Taken 7/29/2024 1720 by Kerrie Cline RN  Device Skin Pressure Protection: absorbent pad utilized/changed  Goal: Absence of Ventilator-Induced Lung Injury  Intervention: Facilitate Lung-Protection Measures  Recent Flowsheet Documentation  Taken 7/29/2024 1720 by Kerrie Cline RN  Lung Protection Measures: fluid excess minimized  Intervention: Prevent Ventilator-Associated Pneumonia  Recent Flowsheet Documentation  Taken 7/29/2024 1720 by Kerrie Cline RN  Oral Care: swabbed with antiseptic solution     Problem: Anxiety Signs/Symptoms  Goal: Improved Mood Symptoms (Anxiety Signs/Symptoms)  Intervention: Optimize Emotion and Mood  Recent Flowsheet Documentation  Taken 7/29/2024 1720 by Kerrie Cline RN  Supportive Measures:   active listening utilized   positive reinforcement provided  Goal: Enhanced Social, Occupational or Functional Skills (Anxiety Signs/Symptoms)  Intervention: Promote Social, Occupational and Functional Ability  Recent Flowsheet Documentation  Taken 7/29/2024 1720 by Kerrie Cline RN  Social Functional Ability Promotion: self-expression encouraged  Trust Relationship/Rapport:   care explained   questions answered   questions encouraged   reassurance provided  Goal: Improved Somatic Symptoms (Anxiety Signs/Symptoms)  Intervention: Minimize Somatic Disturbance  Recent Flowsheet Documentation  Taken 7/29/2024 1720 by Kerrie Cline RN  Complementary Therapy: (watching  TV) other (see comments)     Problem: Pain Acute  Goal: Optimal Pain Control and Function  Intervention: Prevent or Manage Pain  Recent Flowsheet  Documentation  Taken 7/29/2024 1720 by Kerrie Cline RN  Sensory Stimulation Regulation: television on  Sleep/Rest Enhancement:   family presence promoted   regular sleep/rest pattern promoted  Complementary Therapy: (watching  TV) other (see comments)  Medication Review/Management: medications reviewed  Intervention: Optimize Psychosocial Wellbeing  Recent Flowsheet Documentation  Taken 7/29/2024 1720 by Kerrie Cline RN  Spiritual Activities Assistance: other (see comments)  Supportive Measures:   active listening utilized   positive reinforcement provided     Problem: Enteral Nutrition  Goal: Absence of Aspiration Signs and Symptoms  Intervention: Minimize Aspiration Risk  Recent Flowsheet Documentation  Taken 7/29/2024 1720 by Kerrie Cline RN  Enteral Feeding Safety: tubing labeled as enteral feeding  Oral Care: swabbed with antiseptic solution  Goal: Safe, Effective Therapy Delivery  Intervention: Prevent Feeding-Related Adverse Events  Recent Flowsheet Documentation  Taken 7/29/2024 1720 by Kerrie Cline RN  Enteral Feeding Safety: tubing labeled as enteral feeding  Goal: Feeding Tolerance  Intervention: Prevent and Manage Feeding Intolerance  Recent Flowsheet Documentation  Taken 7/29/2024 1720 by Kerrie Cline RN  Nutrition Support Management: weight trending reviewed     Problem: Wound  Goal: Optimal Coping  Intervention: Support Patient and Family Response  Recent Flowsheet Documentation  Taken 7/29/2024 1720 by Kerrie Cline RN  Supportive Measures:   active listening utilized   positive reinforcement provided  Family/Support System Care: support provided  Goal: Optimal Functional Ability  Intervention: Optimize Functional Ability  Recent Flowsheet Documentation  Taken 7/29/2024 1720 by Kerrie Cline RN  Assistive Device Utilized: lift device  Activity Management: activity adjusted per tolerance  Activity Assistance Provided: assistance, 2  people  Goal: Absence of Infection Signs and Symptoms  Intervention: Prevent or Manage Infection  Recent Flowsheet Documentation  Taken 7/29/2024 1720 by Kerrie Cline RN  Infection Management: aseptic technique maintained  Isolation Precautions: contact precautions maintained  Goal: Improved Oral Intake  Intervention: Promote and Optimize Oral Intake  Recent Flowsheet Documentation  Taken 7/29/2024 1720 by Kerrie Cline RN  Oral Nutrition Promotion: rest periods promoted  Nutrition Support Management: weight trending reviewed  Goal: Optimal Pain Control and Function  Intervention: Prevent or Manage Pain  Recent Flowsheet Documentation  Taken 7/29/2024 1720 by Kerrie Cline RN  Sleep/Rest Enhancement:   family presence promoted   regular sleep/rest pattern promoted  Complementary Therapy: (watching  TV) other (see comments)  Goal: Skin Health and Integrity  Intervention: Optimize Skin Protection  Recent Flowsheet Documentation  Taken 7/29/2024 1720 by Kerrie Cline RN  Pressure Reduction Techniques: heels elevated off bed  Pressure Reduction Devices: heel offloading device utilized  Skin Protection: incontinence pads utilized  Activity Management: activity adjusted per tolerance  Goal: Optimal Wound Healing  Intervention: Promote Wound Healing  Recent Flowsheet Documentation  Taken 7/29/2024 1720 by Kerrie Cline RN  Sleep/Rest Enhancement:   family presence promoted   regular sleep/rest pattern promoted     Problem: Hemodialysis  Goal: Safe, Effective Therapy Delivery  Intervention: Optimize Device Care and Function  Recent Flowsheet Documentation  Taken 7/29/2024 1720 by Kerrie Cline RN  Medication Review/Management: medications reviewed  Circuit Management: air detection alarms on  Goal: Effective Tissue Perfusion  Intervention: Optimize Blood Flow  Recent Flowsheet Documentation  Taken 7/29/2024 1720 by Kerrie Cline RN  Stabilization Measures: legs  elevated  Goal: Absence of Infection Signs and Symptoms  Intervention: Prevent or Manage Infection  Recent Flowsheet Documentation  Taken 7/29/2024 1720 by Kerrie Cline RN  Infection Prevention:   cohorting utilized   personal protective equipment utilized  Infection Management: aseptic technique maintained     Goal Outcome Evaluation:     Patient  alert  and  oriented  X  4.  Systolic  BPs  ranged  between  130 -  165 mm Hg  this  evening  while  RRs  were  between  29 -  31/minute  on  full  mechanical ventilation. She is  on  scheduled  Coreg  at  bedtime. RT  and  Pulmonologist  following  patient.  She was dialyzed  this afternoon  and  2.3  liters  of  fluids  were  pulled  from  her  after  3.5   hours  of  HD  run.  She  complained  of  being  anxious  immediately after  she  got  off  HD,  PRN  Ativan 1 mg  was  given  at  1723 with  some  calming  relief.  She  was  also  medicated  with scheduled  Ativan  0.5  mg  at  1938 and  it  was  helpful.   Her  BG  level  at 1735  was  181 mg/dL;  Novolog  Insulin  2  Units  was  given  for  correction  dose.  She is  currently  NPO  but  receiving  tube  feeding  and  medications  via  enteral  tube as   well  as  scheduled  water / medications'  flushes.  Will keep  monitoring  patient's  progress and  safety.

## 2024-07-30 NOTE — PLAN OF CARE
Goal Outcome Evaluation:    Problem: Adult Inpatient Plan of Care  Goal: Plan of Care Review  Description: The Plan of Care Review/Shift note should be completed every shift.  The Outcome Evaluation is a brief statement about your assessment that the patient is improving, declining, or no change.  This information will be displayed automatically on your shift  note.  Outcome: Progressing     Problem: Mechanical Ventilation Invasive  Goal: Optimal Device Function  Outcome: Progressing  Intervention: Optimize Device Care and Function  Recent Flowsheet Documentation  Taken 7/30/2024 0030 by Denny Lizarraga RN  Airway Safety Measures: all equipment/monitors on and audible  Airway/Ventilation Management: airway patency maintained  Oral Care: swabbed with antiseptic solution     Problem: Anxiety Signs/Symptoms  Goal: Optimized Energy Level (Anxiety Signs/Symptoms)  Outcome: Progressing     Problem: Pain Acute  Goal: Optimal Pain Control and Function  Outcome: Progressing  Intervention: Prevent or Manage Pain  Recent Flowsheet Documentation  Taken 7/30/2024 0030 by Denny Lizarraga RN  Sensory Stimulation Regulation: television on  Sleep/Rest Enhancement:   family presence promoted   regular sleep/rest pattern promoted  Complementary Therapy: (watching  TV) other (see comments)  Medication Review/Management: medications reviewed  Intervention: Optimize Psychosocial Wellbeing  Recent Flowsheet Documentation  Taken 7/30/2024 0030 by Denny Lizarraga RN  Spiritual Activities Assistance: other (see comments)  Supportive Measures:   active listening utilized   positive reinforcement provided     Problem: Enteral Nutrition  Goal: Absence of Aspiration Signs and Symptoms  Outcome: Progressing  Intervention: Minimize Aspiration Risk  Recent Flowsheet Documentation  Taken 7/30/2024 0030 by Denny Lizarraga RN  Enteral Feeding Safety: tubing labeled as enteral feeding  Oral Care: swabbed with antiseptic solution   Pt A/ O X 4, vital signs stable, on  full vent support, SPO2 stable, denies pain, incontinent of loose stool x 3, georges area reddened, Calmoseptine cream applied 2, pt calm and cooerative with caress, slept well between cares.

## 2024-07-30 NOTE — PLAN OF CARE
Goal Outcome Evaluation:    Problem: Adult Inpatient Plan of Care  Goal: Optimal Comfort and Wellbeing  Outcome: Progressing     Problem: Mechanical Ventilation Invasive  Goal: Mechanical Ventilation Liberation  Outcome: Progressing  Intervention: Promote Extubation and Mechanical Ventilation Liberation  Recent Flowsheet Documentation  Taken 7/30/2024 1019 by Rehana An RN  Medication Review/Management: medications reviewed  Environmental Support: calm environment promoted     Problem: Anxiety Signs/Symptoms  Goal: Optimized Energy Level (Anxiety Signs/Symptoms)  Outcome: Progressing    Patient is alert and oriented. V/S stable. Mechanical vent intact. Patient denies any pain, however, increased anxiety was managed with PRN ativan 0.5 mg via FT at 09:07 am with some relief. Large loose bowel movement had streaks of dark blood. MD notified, to monitor patient. Patient son at her bedside providing moral and physical support as needed. Will continue to monitor.

## 2024-07-30 NOTE — CONSULTS
SP[facility] ([facility])  SPIRITUAL ASSESSMENT Progress Note  [facility]. Unit [facility]     REFERRAL SOURCE: Consult Thank you for the referral.      I was asked to speak with the family at length after the care conference regarding resources to help children deal with the imminent death of their grandmother. Ranjan, his three daughters and Kecia all participated in the conversation. And Evelyn and Nasreen would like me to contact them with resources.     Evelyn [email or web address]  Holly Thoennes (mary ann pimentel)  [email or web address]    The family would like a Hinduism communion service at Kecia's bedside on Friday when she goes on comfort care.          PLAN: I have contacted other chaplains in the department for assistance with both of these requests.      Symone Vazquez, Ph.D., [facility]      [facility] available 24/7 for emergency requests/referrals, either by having the on-call  paged or by entering an ASAP/STAT consult in Epic (this will also page the on-call ).

## 2024-07-30 NOTE — PROGRESS NOTES
Pulmonary Progress Note    Admit Date: 7/19/2024  CODE: No CPR- Pre-arrest intubation OK    Assessment/Plan:     Problems:  Acute on chronic hypoxic and hypercapnic respiratory failure suspect d/t post obstructive multi-lobar pneumonia 2/2 right bronchus stenosis s/p stent RMB to BI & bifurcating stent in RUL. Completed antibiotics. S/p trach on prolonged mechanical ventilation. Remains on minimal vent settings. Poor weaning with PS and TM largely d/t anxiety so attempting to wean via trach capping during the day with improved success thus far.   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 EBV 1680 and log 3.2 on 7/22.  Repeat EBG 7/29 <35 with log 1.5  ESRD on iHD  Anxiety with panic attacks limiting weaning potential: Lexapro started 7/25. Ativan scheduled and PRN  Pancytopenia: worsening 7/22 s/p Neupagen x1 7/23: Med related? On VGCV as above. Accelerated prednisone taper. CMV still <35 w log 1.5 so continuing VGCV. Counts today ok but trending down    Plan/Recommendations:  Phase 3 vent wean pathway: Cap days as tolerated. AC at night. Pt presently wanting 24/7 vent support, refused weans over the weekend, family/patient considering hospice, meeting with palliative again today   CxR weekly for now - largely unchanged from last week, no acute findings  CBC and diff tomorrow m  Steroid taper: Currenlty prednisone 40mg daily(decrease to 35mg daily  on 8/7)  Nebs: levalbuterol QID, Mucomyst BID alternating with HTS for stent   PTA Singulair   Azithromycin 125 mg daily (resumed 7/18 at lower dose, prior held 7/10 for prolonged Qtc; Qtc 438 on 7/26)  Tacro levels qMTh - pharmacy and transplant to adjust dose accordingly. Level 7.8 on 7/29. 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 The patient was admitted on 6/18/24 for progressive hypoxia with dyspnea and worsening hypercapnia in ED requiring intubation likely d/t post-obstructive PNA 2/2 right bronchus stenosis. S/p IP bronch (6/20) with laser tissue debulking RMB stenosis, airway balloon dilation of RMB and BI, and placement of stent RMB to BI and bifurcating stent in RUL. S/p CRRT (6/20-7/2), iHD resumed 7/4. Recurrence of paroxysmal afib with RVR first noted 6/25. S/p trach with ENT on 7/3 and GJ tube with IR on 7/5. Treating empirically for possible immune/inflammatory process vs organizing pneumonia (given GG changes on CT) with steroid burst/taper. Gradual improvement in PST on ventilator. She was discharged to LTACH 7/18/024 in the afternoon but was readmitted to MICU the same evening for suspected acute on chronic hypoxic respiratory failure, but likely exacerbated by severe anxiety episode. Transferred back to LTAC 7/19.     Subjective: in bed, appears in no acute distress on  "AC.  +mild sob, severe anxiety.  \"I'm tired.\"      Ventilator weaning results:  7/17: PST 3hr  7/18: PS 10 for total 7hr   7/19: no wean, new admit   7/20: Passed BDT, wore in-line PMV 17min. PS 12 for 50 min(stopped per pt request)  7/21: PS 12 for 1hr 40min(tachypnea, pt request to stop)  7/22: 50L/50%TM/PMV for 35min(stopped d/t anxiety)  7/23: PS 15 for 12min, then 15min of TM/PMV(pt requesting to stop d/t anxiety)  7/24: Capped 1hr 15min   7/25: Capped 2hr   7/26: Capped on 3L NC for 2hr(stopped per pt request)  7/27: Capped on 3L NC for 2hr(stopped per pt request)  7/28-present: on vent 24/7 per pt request     Tracheal secretions: suctioned 8x in past 24hr for small to moderate amounts, thick     Clinical status discussed today with respiratory therapist    ROS: 10-system review obtained and negative with the exception of the symptoms noted above.    Medications:     Current Facility-Administered Medications   Medication Dose Route Frequency Provider Last Rate Last Admin    dextrose 10% infusion   Intravenous Continuous PRN Dulce Hardy MD 30 mL/hr at 07/25/24 0009 1,000 mL at 07/25/24 0009    Patient is already receiving anticoagulation with heparin, enoxaparin (LOVENOX), warfarin (COUMADIN)  or other anticoagulant medication   Does not apply Continuous PRN Dulce Hardy MD        Stop Heparin 60 minutes before end of treatment   Does not apply Continuous PRN Ioana Ho MD         Current Facility-Administered Medications   Medication Dose Route Frequency Provider Last Rate Last Admin    acetylcysteine (MUCOMYST) 20 % nebulizer solution 2 mL  2 mL Nebulization BID Eyad Harding APRN CNP   2 mL at 07/30/24 0724    azithromycin (ZITHROMAX) suspension 125 mg  125 mg Per J Tube Daily Dulce Hardy MD   125 mg at 07/30/24 0847    B and C vitamin Complex with folic acid (NEPHRONEX) liquid 5 mL  5 mL Per Feeding Tube Daily Dulce Hardy MD   5 mL at 07/30/24 0849    " carvedilol (COREG) tablet 6.25 mg  6.25 mg Per Feeding Tube BID Casa Chaudhari MD   6.25 mg at 07/30/24 0850    chlorhexidine (PERIDEX) 0.12 % solution 15 mL  15 mL Mouth/Throat Q12H Dulce Hardy MD   15 mL at 07/30/24 0850    epoetin alisha-epbx (RETACRIT) injection 7,000 Units  7,000 Units Intravenous Once per day on Monday Wednesday Friday Chalo Aldridge PA-C   7,000 Units at 07/29/24 1207    escitalopram (LEXAPRO) solution 10 mg  10 mg Per Feeding Tube Daily Eyad Harding APRN CNP   10 mg at 07/30/24 0849    fiber modular (BANATROL TF) packet 1 packet  1 packet Per Feeding Tube TID Dulce Hardy MD   1 packet at 07/30/24 1302    heparin ANTICOAGULANT injection 5,000 Units  5,000 Units Subcutaneous Q8H uDlce Hardy MD   5,000 Units at 07/30/24 1302    insulin aspart (NovoLOG) injection (RAPID ACTING)  1-12 Units Subcutaneous Q6H Dulce Hardy MD   4 Units at 07/30/24 1300    insulin glargine (LANTUS PEN) injection 10 Units  10 Units Subcutaneous QAM AC Casa Chaudhari MD   10 Units at 07/30/24 0648    levalbuterol (XOPENEX) neb solution 0.63 mg  0.63 mg Nebulization 4x Daily Eyad Harding APRN CNP   0.63 mg at 07/30/24 1248    lidocaine (PF) (XYLOCAINE) 1 % injection 5 mL  5 mL Subcutaneous During Dialysis/CRRT (from stock) Chalo Aldridge PA-C   5 mL at 07/29/24 1205    LORazepam (ATIVAN) 2 MG/ML (HIGH CONC) oral solution 0.5 mg  0.5 mg Per G Tube Q6H [facility] Casa Chaudhari MD   0.5 mg at 07/30/24 1301    montelukast (SINGULAIR) tablet 10 mg  10 mg Per J Tube At Bedtime Dulce Hardy MD   10 mg at 07/29/24 2116    pantoprazole (PROTONIX) 2 mg/mL suspension 40 mg  40 mg Per J Tube At Bedtime Dulce Hardy MD   40 mg at 07/29/24 2123    polyethylene glycol (MIRALAX) Packet 17 g  17 g Per Feeding Tube Daily Dulce Hardy MD   17 g at 07/28/24 0850    posaconazole (NOXAFIL) DR tablet TBEC 300 mg  300 mg Per Feeding Tube QA Dulce Hardy MD    300 mg at 07/30/24 1301    predniSONE (DELTASONE) tablet 40 mg  40 mg Per J Tube Daily Eyad Harding APRN CNP   40 mg at 07/30/24 0849    Prosource TF20 ENfit Compatibl EN LIQD (PROSOURCE TF20) packet 60 mL  1 packet Per Feeding Tube Daily Dulce Hardy MD   60 mL at 07/30/24 1301    ramelteon (ROZEREM) tablet 8 mg  8 mg Per J Tube At Bedtime Dulce Hardy MD   8 mg at 07/29/24 2123    sodium chloride (NEBUSAL) 3 % neb solution 3 mL  3 mL Nebulization 2 times daily Eyad Harding APRN CNP   3 mL at 07/30/24 1248    sodium chloride (PF) 0.9% PF flush 3 mL  3 mL Intracatheter Q8H Dulce Hardy MD   3 mL at 07/30/24 1302    sulfamethoxazole-trimethoprim (BACTRIM/SEPTRA) suspension 80 mg  10 mL Per Feeding Tube Once per day on Tuesday Thursday Saturday Dulce Hardy MD   80 mg at 07/27/24 2016    tacrolimus (GENERIC) suspension 1 mg  1 mg Per G Tube BID IS Dulce Hardy MD   1 mg at 07/30/24 0849    traZODone (DESYREL) tablet 50 mg  50 mg Per J Tube At Bedtime Casa Chaudhari MD   50 mg at 07/29/24 2116    valGANciclovir (VALCYTE) solution 450 mg  450 mg Per J Tube Once per day on Tuesday Saturday Dulce Hardy MD   450 mg at 07/27/24 2014    Vitamin D3 (CHOLECALCIFEROL) tablet 50 mcg  50 mcg Per J Tube Once per day on Monday Wednesday Friday Dulce Hardy MD   50 mcg at 07/29/24 0844         Exam/Data:   Vitals  /60   Pulse 82   Temp 98.4  F (36.9  C) (Oral)   Resp 24   Wt 52 kg (114 lb 10.2 oz)   LMP 06/07/2014 (Exact Date)   SpO2 99%   BMI 20.31 kg/m    BP - Mean:  [] 107  I/O last 3 completed shifts:  In: 1307 [I.V.:3; NG/GT:1304]  Out: 2300 [Other:2300]  Weight change: -1.3 kg (-2 lb 13.9 oz)    Vent Mode: CMV/AC  FiO2 (%): 25 %  Resp Rate (Set): 18 breaths/min  Tidal Volume (Set, mL): 360 mL  PEEP (cm H2O): 5 cmH2O  Resp: 24      EXAM:  Gen: No acute distress on AC, looks tired  HEENT: NT, trach midline/intact  CV: RRR, no m/g/r  Resp:  coarse on R, clear on L; non-labored; no wheeze   Abd: soft, nontender, BS+  Skin: no visible rashes or lesions  Ext: no edema  Neuro: alert, follows commands     Labs:  Complete Blood Count   Recent Labs   Lab 07/29/24  0635 07/27/24  0633 07/26/24  0635 07/25/24 2001 07/25/24  0642   WBC 2.5* 3.8* 4.0  --  3.8*   HGB 7.4* 8.1* 7.9* 8.4* 7.6*    202 183  --  157     Basic Metabolic Panel  Recent Labs   Lab 07/30/24  1214 07/30/24  0553 07/29/24  2357 07/29/24  1735 07/29/24  0727 07/29/24  0635 07/25/24  2349 07/25/24 2001   NA  --   --   --   --   --  135  --  133*   POTASSIUM  --   --   --   --   --  3.6  --  4.7   CHLORIDE  --   --   --   --   --  96*  --   --    CO2  --   --   --   --   --  27  --   --    BUN  --   --   --   --   --  86.4*  --   --    CR  --   --   --   --   --  2.61*  --   --    * 176* 210* 181*   < > 177*   < > 204*    < > = values in this interval not displayed.     Liver Function Tests  Recent Labs   Lab 07/29/24  0635   AST 29   ALT 37   ALKPHOS 382*   BILITOTAL 0.4   ALBUMIN 2.6*     Venous Blood Gas  Recent Labs   Lab 07/25/24 2001   PHV 7.32   PCO2V 59*   PO2V 33   HCO3V 27   LASHAUN 4.6*       Radiology: Personally reviewed; radiology read below    XR CHEST PORT 7/29/2024  Tracheostomy and right mainstem bronchus stent are unchanged. Slightly increased left lower lobe atelectasis with scattered right lung atelectasis. No enlarging pleural effusion with similar left posterior pleural calcification. Stable heart size and mediastinal contours. Percutaneous gastrojejunostomy tube.    XR CHEST PORT 7/23/2024  Tracheostomy tube remains in place. Stable postoperative changes of sternotomy, right thoracotomy and hilar stent placement. Heart size is within normal limits. Triangular opacity in the inferomedial left hemithorax is unchanged. Mixed interstitial and airspace opacity in the left lower lung field is similar to previous. No new focal consolidation. No pleural effusion or  pneumothorax. Gastrostomy in place.    XR CHEST PORT 1 VIEW 7/18/2024   Frontal view of the chest. Stable tracheostomy tube. Stable right-sided bronchial stents. Prior sternotomy. Stable heart size. Midline trachea. Improved aeration throughout the left lung. No pneumothorax. Right chest wall/pectoral surgical clips.                                                      Chest CT  7/17/2024                                                                IMPRESSION:   1. Overall improved appearance of the confluent groundglass opacities  primarily affecting the bilateral lower lobes. There is a more  appreciable improvement in the right middle lobe and lingula with  relative sparing of the apices.   2. Status post bilateral lung transplantation with stable support  devices.  3. Cholelithiasis.  4. Ectatic ascending aorta measuring up to 4.0 cm.  5. Atherosclerosis.    Eyad Harding CNP  Pulmonary Medicine  [facility]  Pager [phone]  Office: [phone]

## 2024-07-30 NOTE — PROGRESS NOTES
PALLIATIVE CARE PROGRESS NOTE  [facility]     Patient Name: Kecia Blue  Date of Admission: 7/19/2024   Today the patient was seen for: Goals of care     Recommendations & Counseling       GOALS OF CARE:   Patient requesting transition  to comfort care in the coming days. She would like  to  continue with current cares  and treatments for now, and hoping to see family tomorrow to  say  goodbyes.  Plan for last dialysis run on Wednesday.   Patient also  requesting increase in medication for anxiety.  She is aware increasing anti-anxiety  medication may result in being more sedated.    Discussed with  Dr. Benedict  and Eyad Harding, DOMINIC.  Appreciate support with managing transition to comfort care later  this week.  Palliative care will follow  up with  via phone later this week, per his request. Please reach out if further questions for our team.     ADVANCE CARE PLANNING:  Health care directive on file.  Primary  health  care agent is  Howard, secondary Evelyn Jha Holly  There is no POLST form on file, recommend to complete prior to DC.  Code status: Full Code     MEDICAL MANAGEMENT:   Palliative care is not actively  managing  symptoms     Anxiety, shortness  of breath  -Increase Lorazepam to 1 mg every 6 hours scheduled and every 4 hours PRN    -Add oxycodone 5 mg SL every  4 hours  PRN  as first line for pain  or shortness of breath.  Dilaudid 0.3 mg iV every 3 hours PRN as second  line.    -Likely will need more liberal dosing of comfort  medications  once she transitions to  comfort care.  Recommend utilizing  comfort care order  set.      Defer  to primary  team re:  de-prescribing once transition to comfort care.  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 The patient was admitted on 6/18/24 for progressive hypoxia with dyspnea and worsening hypercapnia in ED requiring intubation likely d/t post-obstructive PNA 2/2 right bronchus stenosis. S/p IP bronch (6/20) with laser tissue debulking RMB stenosis, airway balloon dilation of RMB and BI, and placement of stent RMB to BI and bifurcating stent in RUL. S/p CRRT (6/20-7/2), iHD resumed 7/4. Recurrence of paroxysmal afib with RVR first noted 6/25. S/p trach with ENT on 7/3 and GJ tube with IR on 7/5. Treating empirically for possible immune/inflammatory process vs organizing pneumonia (given GG changes on CT) with steroid burst/taper. Gradual improvement in PST on ventilator. She was discharged to LTACH 7/18/024 in the afternoon but was readmitted to MICU the same evening for suspected acute on chronic hypoxic respiratory failure, but likely exacerbated by severe anxiety episode. Transferred back to LTAC 7/19.         Interval History:     Multidisciplinary collaboration:  Chart reviewed and discussed with  primary  team, pulmonary, RN    Patient/family narrative  Met with patient,   and 3 daughters.   Patient requesting to transition to comfort care later this week and family supports this decision.  See above  impression, recommendations.    Patient reports feeling anxious, yet at peace with her decision.  She  wants to  visit with her mother tomorrow  before making  any  changes  to  her care.     Review of Systems:     Besides above, ROS was reviewed and is unremarkable.         Physical Exam:   Temp:  [97.9  F (36.6  C)-98.4  F (36.9  C)] 98.4  F (36.9  C)  Pulse:  [76-96] 82  Resp:  [20-31] 24  BP: (118-165)/(59-81) 122/60  FiO2 (%):  [25 %] 25 %  SpO2:  [98 %-100 %] 100 %  114 lbs 10.23 oz    Physical Exam    General:  Fatigued, NAD  Resp:  Trach  in place, on ventilator  Neuro: Oriented X 4, able to communicate by  mouthing words           Data Reviewed:     Results for orders placed or performed during the hospital encounter of 07/19/24 (from the past 24 hour(s))   Glucose by meter   Result Value Ref Range    GLUCOSE BY METER POCT 217 (H) 70 - 99 mg/dL   Glucose by meter   Result Value Ref Range    GLUCOSE BY METER POCT 242 (H) 70 - 99 mg/dL   Glucose by meter   Result Value Ref Range    GLUCOSE BY METER POCT 211 (H) 70 - 99 mg/dL   Glucose by meter   Result Value Ref Range    GLUCOSE BY METER POCT 148 (H) 70 - 99 mg/dL     *Note: Due to a large number of results and/or encounters for the requested time period, some results have not been displayed. A complete set of results can be found in Results Review.         95 MINUTES SPENT BY ME on the date of service doing chart review, history, exam, documentation & further activities per the note.

## 2024-07-30 NOTE — PLAN OF CARE
Problem: Mechanical Ventilation Invasive  Goal: Optimal Device Function  Outcome: Progressing  Intervention: Optimize Device Care and Function  Recent Flowsheet Documentation  Taken 7/30/2024 0024 by Monica Caballero RT  Airway Safety Measures: all equipment/monitors on and audible  Goal: Mechanical Ventilation Liberation  Outcome: Progressing  Goal: Absence of Device-Related Skin and Tissue Injury  Outcome: Progressing  Goal: Absence of Ventilator-Induced Lung Injury  Outcome: Progressing   Goal Outcome Evaluation:  RT PROGRESS NOTE     DATA:     CURRENT SETTINGS: 18/360/+5             TRACH TYPE / SIZE: #6 Katty, placed on 07/08/24             MODE: AC             FIO2: 25%     ACTION:             THERAPIES: Xopenex and sodium chloride BID              SUCTION:                           FREQUENCY: x2                        AMOUNT: small                        CONSISTENCY: thick                        COLOR: white             SPONTANEOUS COUGH EFFORT/STRENGTH OF EFFORT (not elicited by suctioning): good                              WEANING PHASE: 3                        WEAN MODE:  cap                        WEAN TIME:                           END WEAN REASON:        RESPONSE:             BS: clear             VITAL SIGNS: /61 (BP Location: Right arm)   Pulse 79   Temp 98.4  F (36.9  C) (Oral)   Resp 25   Wt 53.3 kg (117 lb 8.1 oz)   LMP 06/07/2014 (Exact Date)   SpO2 100%   BMI 20.82 kg/m                EMOTIONAL NEEDS / CONCERNS:                  RISK FOR SELF DECANNULATION:                          RISK DUE TO:                          INTERVENTION/S IN PLACE IS/ARE:         NOTE / PLAN: Cap days as tolerated.  AC night

## 2024-07-31 NOTE — PLAN OF CARE
Problem: Mechanical Ventilation Invasive  Goal: Optimal Device Function  Outcome: Progressing  Intervention: Optimize Device Care and Function  Recent Flowsheet Documentation  Taken 7/31/2024 0025 by Monica Caballero RT  Airway Safety Measures: all equipment/monitors on and audible  Goal: Mechanical Ventilation Liberation  Outcome: Progressing  Goal: Absence of Device-Related Skin and Tissue Injury  Outcome: Progressing  Goal: Absence of Ventilator-Induced Lung Injury  Outcome: Progressing   Goal Outcome Evaluation:         RT PROGRESS NOTE     DATA:     CURRENT SETTINGS: 18/360/+5             TRACH TYPE / SIZE: #6 Katty, placed on 07/08/24             MODE: AC             FIO2: 25%     ACTION:             THERAPIES: Xopenex QID and sodium chloride BID              SUCTION:                           FREQUENCY: x1                        AMOUNT: small                        CONSISTENCY: thick                        COLOR: white             SPONTANEOUS COUGH EFFORT/STRENGTH OF EFFORT (not elicited by suctioning): good                           WEANING PHASE: 3                        WEAN MODE:  cap                        WEAN TIME:                           END WEAN REASON:        RESPONSE:             BS: clear             VITAL SIGNS: /63 (BP Location: Right arm)   Pulse 75   Temp 97.7  F (36.5  C) (Oral)   Resp 25   Wt 52 kg (114 lb 10.2 oz)   LMP 06/07/2014 (Exact Date)   SpO2 98%   BMI 20.31 kg/m                EMOTIONAL NEEDS / CONCERNS:                  RISK FOR SELF DECANNULATION:                          RISK DUE TO:                          INTERVENTION/S IN PLACE IS/ARE:         NOTE / PLAN: Cap days as tolerated.  AC night

## 2024-07-31 NOTE — PLAN OF CARE
Problem: Adult Inpatient Plan of Care  Goal: Absence of Hospital-Acquired Illness or Injury  Intervention: Identify and Manage Fall Risk  Recent Flowsheet Documentation  Taken 7/31/2024 0030 by Lupe Madrid RN  Safety Promotion/Fall Prevention: activity supervised  Intervention: Prevent Skin Injury  Recent Flowsheet Documentation  Taken 7/31/2024 0030 by Lupe Madrid RN  Skin Protection: incontinence pads utilized  Device Skin Pressure Protection: absorbent pad utilized/changed  Taken 7/31/2024 0021 by Lupe Madrid RN  Body Position:   right   turned   heels elevated   legs elevated  Goal: Optimal Comfort and Wellbeing  Intervention: Provide Person-Centered Care  Recent Flowsheet Documentation  Taken 7/31/2024 0030 by Lupe Madrid RN  Trust Relationship/Rapport:   care explained   choices provided   emotional support provided     Problem: Mechanical Ventilation Invasive  Goal: Effective Communication  Intervention: Ensure Effective Communication  Recent Flowsheet Documentation  Taken 7/31/2024 0030 by Lupe Madrid RN  Trust Relationship/Rapport:   care explained   choices provided   emotional support provided  Goal: Optimal Device Function  Intervention: Optimize Device Care and Function  Recent Flowsheet Documentation  Taken 7/31/2024 0030 by Lupe Madrid RN  Airway Safety Measures: all equipment/monitors on and audible  Airway/Ventilation Management: airway patency maintained  Goal: Mechanical Ventilation Liberation  Intervention: Promote Extubation and Mechanical Ventilation Liberation  Recent Flowsheet Documentation  Taken 7/31/2024 0030 by Lupe Madrid RN  Medication Review/Management: medications reviewed  Environmental Support: calm environment promoted  Goal: Absence of Device-Related Skin and Tissue Injury  Intervention: Maintain Skin and Tissue Health  Recent Flowsheet Documentation  Taken 7/31/2024 0030 by Lupe Madrid RN  Device Skin Pressure Protection:  absorbent pad utilized/changed  Goal: Absence of Ventilator-Induced Lung Injury  Intervention: Prevent Ventilator-Associated Pneumonia  Recent Flowsheet Documentation  Taken 7/31/2024 0021 by Lupe Madrid RN  Head of Bed ([facility]) Positioning: [facility] at 30 degrees     Problem: Anxiety Signs/Symptoms  Goal: Improved Mood Symptoms (Anxiety Signs/Symptoms)  Intervention: Optimize Emotion and Mood  Recent Flowsheet Documentation  Taken 7/31/2024 0030 by Lupe Madrid RN  Supportive Measures: active listening utilized  Goal: Enhanced Social, Occupational or Functional Skills (Anxiety Signs/Symptoms)  Intervention: Promote Social, Occupational and Functional Ability  Recent Flowsheet Documentation  Taken 7/31/2024 0030 by Lupe Madrid RN  Social Functional Ability Promotion: self-expression encouraged  Trust Relationship/Rapport:   care explained   choices provided   emotional support provided     Problem: Pain Acute  Goal: Optimal Pain Control and Function  Intervention: Prevent or Manage Pain  Recent Flowsheet Documentation  Taken 7/31/2024 0030 by Lupe Madrid RN  Medication Review/Management: medications reviewed  Intervention: Optimize Psychosocial Wellbeing  Recent Flowsheet Documentation  Taken 7/31/2024 0030 by Lupe Madrid RN  Supportive Measures: active listening utilized     Problem: Enteral Nutrition  Goal: Absence of Aspiration Signs and Symptoms  Intervention: Minimize Aspiration Risk  Recent Flowsheet Documentation  Taken 7/31/2024 0030 by Lupe Madrid RN  Enteral Feeding Safety: tubing labeled as enteral feeding  Taken 7/31/2024 0021 by Lupe Madrid RN  Head of Bed ([facility]) Positioning: [facility] at 30 degrees  Goal: Safe, Effective Therapy Delivery  Intervention: Prevent Feeding-Related Adverse Events  Recent Flowsheet Documentation  Taken 7/31/2024 0030 by Lupe Madrid RN  Enteral Feeding Safety: tubing labeled as enteral feeding     Problem: Wound  Goal: Optimal  Coping  Intervention: Support Patient and Family Response  Recent Flowsheet Documentation  Taken 7/31/2024 0030 by Lupe Madrid RN  Supportive Measures: active listening utilized  Goal: Optimal Functional Ability  Intervention: Optimize Functional Ability  Recent Flowsheet Documentation  Taken 7/31/2024 0030 by Lupe Madrid RN  Activity Management: bedrest  Activity Assistance Provided: assistance, 2 people  Goal: Absence of Infection Signs and Symptoms  Intervention: Prevent or Manage Infection  Recent Flowsheet Documentation  Taken 7/31/2024 0030 by Lupe Madrid RN  Infection Management: aseptic technique maintained  Goal: Skin Health and Integrity  Intervention: Optimize Skin Protection  Recent Flowsheet Documentation  Taken 7/31/2024 0030 by Lupe Madrid RN  Pressure Reduction Techniques: heels elevated off bed  Pressure Reduction Devices: alternating pressure pump (MARIA A)  Skin Protection: incontinence pads utilized  Activity Management: bedrest  Taken 7/31/2024 0021 by Lupe Madrid RN  Head of Bed ([facility]) Positioning: [facility] at 30 degrees     Problem: Hemodialysis  Goal: Safe, Effective Therapy Delivery  Intervention: Optimize Device Care and Function  Recent Flowsheet Documentation  Taken 7/31/2024 0030 by Lupe Madrid RN  Medication Review/Management: medications reviewed  Goal: Absence of Infection Signs and Symptoms  Intervention: Prevent or Manage Infection  Recent Flowsheet Documentation  Taken 7/31/2024 0030 by Lupe Madrid RN  Infection Management: aseptic technique maintained   Goal Outcome Evaluation:       Vitals sable. Denied pain when asked.BG was slightly elevated and received sliding scale coverage  per order each time.Suctioned a few times with small to moderate amount of secretions. No signs of anxiety noted all shift. Slept all night. Daughter stayed  all night. Will continue plan of cares .

## 2024-07-31 NOTE — PLAN OF CARE
Physical Therapy Discharge Summary    Reason for therapy discharge:    Change in medical status.    Progress towards therapy goal(s). See goals on Care Plan in [facility] electronic health record for goal details.  Goals not met.  Barriers to achieving goals:   Pt moving to comfort cares.    Therapy recommendation(s):    No further therapy is recommended.

## 2024-07-31 NOTE — PLAN OF CARE
7 PM to 7 AM RT PROGRESS NOTE     DATA:     CURRENT SETTINGS:             TRACH TYPE / SIZE:  6 Shiley placed 7/8/24             MODE:   AC 18, 360, +5, 25%      ACTION:             THERAPIES:   QID Xopenex and BID Saline neb declined. Pt states she doesn't feel they do anything for her.             SUCTION:                           FREQUENCY:   X 3                        AMOUNT:   small                        CONSISTENCY:   thin/thick                        COLOR:   white             SPONTANEOUS COUGH EFFORT/STRENGTH OF EFFORT (not elicited by suctioning): Good                   RESPONSE:             BS:   CS             VITAL SIGNS:   SATs 100%, HR 82, RR 25             RISK FOR SELF DECANNULATION:  No    NOTE / PLAN:   Consider changing nebs to PRN? Pt can cap PRN. Continue with same      Problem: Mechanical Ventilation Invasive  Goal: Optimal Device Function  Outcome: Progressing  Goal: Absence of Device-Related Skin and Tissue Injury  Outcome: Progressing  Goal: Absence of Ventilator-Induced Lung Injury  Outcome: Progressing     Problem: Mechanical Ventilation Invasive  Goal: Mechanical Ventilation Liberation  Outcome: Unable to Meet

## 2024-07-31 NOTE — PROGRESS NOTES
Speech Language Therapy Discharge Summary    Reason for therapy discharge:    Change in medical status.  Pt transitioned to comfort cares.    Progress towards therapy goal(s). See goals on Care Plan in [facility] electronic health record for goal details.  Goals of care adjusted to be comfort based.     Therapy recommendation(s):    Pt transitioned to comfort cares. Pt currently on vent. SLP offered use of PMSV to speak with multiple family members present. RT offered trach capping with NC for oxygen, which pt & family opted instead of PMSV. No further therapy is recommended.

## 2024-07-31 NOTE — PROGRESS NOTES
Care Management Follow Up    Length of Stay (days): 12    Expected Discharge Date: 08/15/2024     Concerns to be Addressed: basic needs, adjustment to diagnosis/illness, discharge planning     Patient plan of care discussed at interdisciplinary rounds: Yes    Anticipated Discharge Disposition: Patient may transition to palliative to comfort cares later this week.     Anticipated Discharge Services:  none  Anticipated Discharge DME: Oxygen (vent)    Patient/family educated on Medicare website which has current facility and service quality ratings: yes  Education Provided on the Discharge Plan:    Patient/Family in Agreement with the Plan: yes    Referrals Placed by CM/SW:    Private pay costs discussed: Not applicable    Additional Information:  Patient was discussed with the interdisciplinary team this week.  Reason patient is not able to discharge to a lower level of care:  patient had a Palliative care consult last week. She is thinking about comfort cares at the end of this week. Family has been visiting all week.  Therapies have stopped seeing patient - she is focusing on her family. Her last day of dialysis is today 7/31/24.      Carlotta Crowe RN, BSN  Care Coordination  [facility]  [phone]

## 2024-07-31 NOTE — COMMITTEE REVIEW
Kidney/Pancreas Committee Review Note     Evaluation Date: 3/8/2022  Committee Review Date: 7/31/2024    Organ being evaluated for: Kidney    Transplant Phase: Evaluation  Transplant Status: Active    Transplant Coordinator: Kacy Quan  Transplant Surgeon:   Toby Hernandez    Referring Physician: Ame Servin    Primary Diagnosis: Calcineurin Inhibitor Nephrotoxicity  Secondary Diagnosis:     Committee Review Members:  Nephrology Stephanie Martell, NP, Aisha Snow MD, Erich Rose, APRN CNP   Nutrition Yudelka Ewing, RD   Pharmacist Rossana Bridges, [facility]    - Clinical Minal Márquez, MSW, Andre Rucker, MSW, Anurag Gill, MSW   Transplant ROHIT SOUZA, RN, Kaitlynn Pop, LOUISE, Georgette Huggins, RN, Keri Jimenez, RN, Tatyana Ricks, RN, Eugenia Resendiz, RN, Elsy Pineda, DOMINIC, Elsy Salazar, RN, Alecia Horn, RN, Arianne Lucas, LOUISE, Melani Henning MD, Ethel Hanna, RN   Transplant Surgery Melani Henning MD       Transplant Eligibility:     Committee Review Decision: Declined    Relative Contraindications:     Absolute Contraindications: Other, Moving to comfort cares     Committee Chair Melani Henning MD verbally attested to the committee's decision.    Committee Discussion Details: Brought patient's current health status and transplant evaluation to date. Patient's current health status has declined after lung infection. Patient has now been trached and is using a g-tube. Patient has decided she would like to move to comfort cares. Will end patient's current kidney transplant evaluation at this time.

## 2024-07-31 NOTE — PROGRESS NOTES
Pulmonary Progress Note    Admit Date: 7/19/2024  CODE: No CPR- Pre-arrest intubation OK    Assessment/Plan:     Problems:  Acute on chronic hypoxic and hypercapnic respiratory failure suspect d/t post obstructive multi-lobar pneumonia 2/2 right bronchus stenosis s/p stent RMB to BI & bifurcating stent in RUL. Completed antibiotics. S/p trach on prolonged mechanical ventilation. Remains on minimal vent settings. Poor weaning with PS and TM largely d/t anxiety so attempting to wean via trach capping during the day with improved success thus far.   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 EBV 1680 and log 3.2 on 7/22.  Repeat EBG 7/29 <35 with log 1.5  ESRD on iHD  Anxiety with panic attacks limiting weaning potential: Lexapro started 7/25. Ativan scheduled and PRN  Pancytopenia: worsening 7/22 s/p Neupagen x1 7/23: Med related? On VGCV as above. Accelerated prednisone taper. CMV still <35 w log 1.5 so continuing VGCV. Counts today ok but trending down    Plan/Recommendations:  Planning to transition to comfort with vent removal on Friday per family.  Ok to cap patient if she requests, otherwise keep on AC support until Friday   Nebs: levalbuterol QID, Mucomyst BID alternating with HTS for stent   Opioids and benzos available for symptom management.  Keep other meds on for now until patient officially transitions to comfort measures     Dr. 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 The patient was admitted on 6/18/24 for progressive hypoxia with dyspnea and worsening hypercapnia in ED requiring intubation likely d/t post-obstructive PNA 2/2 right bronchus stenosis. S/p IP bronch (6/20) with laser tissue debulking RMB stenosis, airway balloon dilation of RMB and BI, and placement of stent RMB to BI and bifurcating stent in RUL. S/p CRRT (6/20-7/2), iHD resumed 7/4. Recurrence of paroxysmal afib with RVR first noted 6/25. S/p trach with ENT on 7/3 and GJ tube with IR on 7/5. Treating empirically for possible immune/inflammatory process vs organizing pneumonia (given GG changes on CT) with steroid burst/taper. Gradual improvement in PST on ventilator. She was discharged to LTACH 7/18/024 in the afternoon but was readmitted to MICU the same evening for suspected acute on chronic hypoxic respiratory failure, but likely exacerbated by severe anxiety episode. Transferred back to LTAC 7/19.     Subjective: in bed, appears in no acute distress on AC on dialysis run. Requesting med for anxiety.   at bedside.     Tracheal secretions: suctioned 8x in past 24hr for small to moderate amounts, thick     Clinical status discussed today with respiratory therapist    ROS: 10-system review obtained and negative with the exception of the symptoms noted above.    Medications:     Current Facility-Administered Medications   Medication Dose Route Frequency Provider Last Rate Last Admin    dextrose 10% infusion   Intravenous Continuous PRN  Dulce Hardy MD 30 mL/hr at 07/25/24 0009 1,000 mL at 07/25/24 0009    Patient is already receiving anticoagulation with heparin, enoxaparin (LOVENOX), warfarin (COUMADIN)  or other anticoagulant medication   Does not apply Continuous PRN Dulce Hardy MD        Stop Heparin 60 minutes before end of treatment   Does not apply Continuous PRN Ioana Ho MD         Current Facility-Administered Medications   Medication Dose Route Frequency Provider Last Rate Last Admin    acetylcysteine (MUCOMYST) 20 % nebulizer solution 2 mL  2 mL Nebulization BID Eyad Harding APRN CNP   2 mL at 07/31/24 0845    azithromycin (ZITHROMAX) suspension 125 mg  125 mg Per J Tube Daily Dulce Hardy MD   125 mg at 07/31/24 0857    B and C vitamin Complex with folic acid (NEPHRONEX) liquid 5 mL  5 mL Per Feeding Tube Daily Dulce Hardy MD   5 mL at 07/31/24 0857    carvedilol (COREG) tablet 6.25 mg  6.25 mg Per Feeding Tube BID Casa Chaudhari MD   6.25 mg at 07/31/24 0857    chlorhexidine (PERIDEX) 0.12 % solution 15 mL  15 mL Mouth/Throat Q12H Dulce Hardy MD   15 mL at 07/31/24 0857    epoetin alisha-epbx (RETACRIT) injection 7,000 Units  7,000 Units Intravenous Once per day on Monday Wednesday Friday Chalo Aldridge PA-C   7,000 Units at 07/31/24 1225    escitalopram (LEXAPRO) solution 10 mg  10 mg Per Feeding Tube Daily Eyad Harding APRN CNP   10 mg at 07/31/24 0858    fiber modular (BANATROL TF) packet 1 packet  1 packet Per Feeding Tube TID Dulce Hardy MD   1 packet at 07/31/24 0858    heparin ANTICOAGULANT injection 5,000 Units  5,000 Units Subcutaneous Q8H Dulce Hardy MD   5,000 Units at 07/31/24 0610    insulin aspart (NovoLOG) injection (RAPID ACTING)  1-12 Units Subcutaneous Q6H Dulce Hardy MD   1 Units at 07/31/24 0610    insulin glargine (LANTUS PEN) injection 10 Units  10 Units Subcutaneous QAM Casa Odom MD   10 Units at 07/31/24  0859    levalbuterol (XOPENEX) neb solution 0.63 mg  0.63 mg Nebulization 4x Daily Eyad Harding APRN CNP   0.63 mg at 07/31/24 1122    lidocaine (PF) (XYLOCAINE) 1 % injection 5 mL  5 mL Subcutaneous During Dialysis/CRRT (from stock) Chalo Aldridge PA-C   5 mL at 07/31/24 1228    LORazepam (ATIVAN) 2 MG/ML (HIGH CONC) oral solution 1 mg  1 mg Per G Tube Q6H [facility] Antoinette Hines, CNS   1 mg at 07/31/24 1227    montelukast (SINGULAIR) tablet 10 mg  10 mg Per J Tube At Bedtime Dulce Hardy MD   10 mg at 07/30/24 2115    pantoprazole (PROTONIX) 2 mg/mL suspension 40 mg  40 mg Per J Tube At Bedtime Dulce Hardy MD   40 mg at 07/30/24 2118    posaconazole (NOXAFIL) DR tablet TBEC 300 mg  300 mg Per Feeding Tube QAM Dulce Hardy MD   300 mg at 07/30/24 1301    predniSONE (DELTASONE) tablet 40 mg  40 mg Per J Tube Daily Eyad Harding APRN CNP   40 mg at 07/31/24 0858    Prosource TF20 ENfit Compatibl EN LIQD (PROSOURCE TF20) packet 60 mL  1 packet Per Feeding Tube Daily Dulce Hardy MD   60 mL at 07/31/24 0858    ramelteon (ROZEREM) tablet 8 mg  8 mg Per J Tube At Bedtime Dulce Hardy MD   8 mg at 07/30/24 2119    sodium chloride (NEBUSAL) 3 % neb solution 3 mL  3 mL Nebulization 2 times daily Eyad Harding APRN CNP   3 mL at 07/31/24 1122    sodium chloride (PF) 0.9% PF flush 3 mL  3 mL Intracatheter Q8H Dulce Hardy MD   3 mL at 07/31/24 0611    sulfamethoxazole-trimethoprim (BACTRIM/SEPTRA) suspension 80 mg  10 mL Per Feeding Tube Once per day on Tuesday Thursday Saturday Dulce Hardy MD   80 mg at 07/30/24 2119    tacrolimus (GENERIC) suspension 1 mg  1 mg Per G Tube BID IS Dulce Hardy MD   1 mg at 07/31/24 0857    traZODone (DESYREL) tablet 50 mg  50 mg Per J Tube At Bedtime Casa Chaudhari MD   50 mg at 07/30/24 2115    valGANciclovir (VALCYTE) solution 450 mg  450 mg Per J Tube Once per day on Tuesday Saturday Dulce Hardy MD    450 mg at 07/30/24 2119    Vitamin D3 (CHOLECALCIFEROL) tablet 50 mcg  50 mcg Per J Tube Once per day on Monday Wednesday Friday Dulce Hardy MD   50 mcg at 07/31/24 0857         Exam/Data:   Vitals  /63 (BP Location: Right arm, Cuff Size: Adult Small)   Pulse 77   Temp 98  F (36.7  C) (Oral)   Resp 28   Wt 52.7 kg (116 lb 2.9 oz)   LMP 06/07/2014 (Exact Date)   SpO2 100%   BMI 20.58 kg/m       I/O last 3 completed shifts:  In: 1060 [NG/GT:1060]  Out: 0   Weight change: 0.7 kg (1 lb 8.7 oz)    Vent Mode: CMV/AC  FiO2 (%): 25 %  Resp Rate (Set): 18 breaths/min  Tidal Volume (Set, mL): 360 mL  PEEP (cm H2O): 5 cmH2O  Resp: 28      EXAM:  Gen: No acute distress on AC, looks tired  HEENT: NT, trach midline/intact  CV: RRR, no m/g/r  Resp: coarse on R, clear on L; non-labored; no wheeze   Abd: soft, nontender, BS+  Skin: no visible rashes or lesions  Ext: no edema  Neuro: alert, follows commands     Labs:  Complete Blood Count   Recent Labs   Lab 07/31/24  0703 07/29/24  0635 07/27/24  0633 07/26/24  0635   WBC 3.0* 2.5* 3.8* 4.0   HGB 6.8* 7.4* 8.1* 7.9*    187 202 183     Basic Metabolic Panel  Recent Labs   Lab 07/31/24  1156 07/31/24  0545 07/31/24  0011 07/30/24  1835 07/29/24  0727 07/29/24  0635 07/25/24  2349 07/25/24 2001   NA  --   --   --   --   --  135  --  133*   POTASSIUM  --   --   --   --   --  3.6  --  4.7   CHLORIDE  --   --   --   --   --  96*  --   --    CO2  --   --   --   --   --  27  --   --    BUN  --   --   --   --   --  86.4*  --   --    CR  --   --   --   --   --  2.61*  --   --    * 148* 211* 242*   < > 177*   < > 204*    < > = values in this interval not displayed.     Liver Function Tests  Recent Labs   Lab 07/29/24  0635   AST 29   ALT 37   ALKPHOS 382*   BILITOTAL 0.4   ALBUMIN 2.6*     Venous Blood Gas  Recent Labs   Lab 07/25/24 2001   PHV 7.32   PCO2V 59*   PO2V 33   HCO3V 27   LASHAUN 4.6*       Radiology: Personally reviewed; radiology read below    XR  CHEST PORT 7/29/2024  Tracheostomy and right mainstem bronchus stent are unchanged. Slightly increased left lower lobe atelectasis with scattered right lung atelectasis. No enlarging pleural effusion with similar left posterior pleural calcification. Stable heart size and mediastinal contours. Percutaneous gastrojejunostomy tube.    XR CHEST PORT 7/23/2024  Tracheostomy tube remains in place. Stable postoperative changes of sternotomy, right thoracotomy and hilar stent placement. Heart size is within normal limits. Triangular opacity in the inferomedial left hemithorax is unchanged. Mixed interstitial and airspace opacity in the left lower lung field is similar to previous. No new focal consolidation. No pleural effusion or pneumothorax. Gastrostomy in place.    XR CHEST PORT 1 VIEW 7/18/2024   Frontal view of the chest. Stable tracheostomy tube. Stable right-sided bronchial stents. Prior sternotomy. Stable heart size. Midline trachea. Improved aeration throughout the left lung. No pneumothorax. Right chest wall/pectoral surgical clips.                                                      Chest CT  7/17/2024                                                                IMPRESSION:   1. Overall improved appearance of the confluent groundglass opacities  primarily affecting the bilateral lower lobes. There is a more  appreciable improvement in the right middle lobe and lingula with  relative sparing of the apices.   2. Status post bilateral lung transplantation with stable support  devices.  3. Cholelithiasis.  4. Ectatic ascending aorta measuring up to 4.0 cm.  5. Atherosclerosis.    Eyad Harding CNP  Pulmonary Medicine  [facility]  Pager [phone]  Office: [phone]

## 2024-07-31 NOTE — PROGRESS NOTES
HEMODIALYSIS TREATMENT NOTE    Date: 7/31/2024  Time: 3.00 PM    Data:  Pre Wt: 52.7 kg (116 lb 2.9 oz)   Desired Wt: 1-2  kg   Post Wt: 51.7 kg (112 lb 7 oz)  Weight change: 1.0 kg  Ultrafiltration - Post Run Net Total Removed (mL): 1000 mL  Vascular Access Status: CVC  patent  Dialyzer Rinse: Clear, Streaked  Total Blood Volume Processed: 44.8L   Total Dialysis (Treatment) Time: 2.0 hours  Dialysate Bath: K 3, Ca 3  Heparin: None    Lab:   No    Interventions:  Pt supposed to have 3.5 hours HD but she requested for 2.0 hours HD before transition to Hospice.  Nephrology was informed and her HD orders changed to 2.0 hours.  Pt had quiet 2.0 hours HD through LAVG, thrill & bruit present.  Lidocaine 0.5 ml in each site was injected prior cannulation. 2 15g needle cannulated successful.   with 600 DFR.  Epogen given per prescription.  Pt hemodynamically stable throughout the treatment, 1.0kg UF pulled, Crit-line steady & SBP > 90's.    Pt completed her treatment time, blood rinsed back ,Graft hemostasis achieved in less than 1.0 minutes  Handoff report given & pt left in a stable condition    Assessment:  Pt on Mechanical Ventilator, alert & oriented x4.  AVG thrill & bruit present.  Monitoring every 15 minutes & PRN     Plan:    Per Renal Team

## 2024-07-31 NOTE — PROGRESS NOTES
Armand Mcdonnell, your tacrolimus level yesterday was 11.4, which is within your goal range of 10-15. No dose changes at this time. Thanks, Evelyn Female

## 2024-07-31 NOTE — TELEPHONE ENCOUNTER
PA Initiation    Medication: POSACONAZOLE 100 MG PO [facility]  Insurance Company: "[employer] - Phone [phone] Fax [phone]  Pharmacy Filling the Rx:    Filling Pharmacy Phone:    Filling Pharmacy Fax:    Start Date: 7/31/2024

## 2024-07-31 NOTE — PROGRESS NOTES
RT PROGRESS NOTE     DATA:     CURRENT SETTINGS:             TRACH TYPE / SIZE:  #6 Katty (placed 7/8)             MODE:   AC 18, 360, +5             FIO2:   25%     ACTION:             THERAPIES:   Xopenex QID, Mucomyst/Sodium Chloride BID             SUCTION:                           FREQUENCY:   x4                        AMOUNT:   Small                        CONSISTENCY:   Thick/Thin                        COLOR:   White             SPONTANEOUS COUGH EFFORT/STRENGTH OF EFFORT (not elicited by suctioning): Weak Spontaneous Cough                           WEANING PHASE:   Phase 3                        WEAN MODE:    Capped 3L NC                        WEAN TIME:   13 minutes                        END WEAN REASON:   Pt Request     RESPONSE:             BS:   Coarse/Diminished             VITAL SIGNS:   Sating 100%, HR 76-88, RR 24-27             EMOTIONAL NEEDS / CONCERNS:  Anxiety                RISK FOR SELF DECANNULATION:  No     NOTE / PLAN:   Ok to cap if patient requests, otherwise keep on AC until Friday when we plan to transition to comfort with vent withdrawal.  RT will continue to monitor.

## 2024-07-31 NOTE — PLAN OF CARE
Occupational Therapy Discharge Summary    Reason for therapy discharge:    Change in medical status.    Progress towards therapy goal(s). See goals on Care Plan in [facility] electronic health record for goal details.  Goals not met.  Barriers to achieving goals:   Pt transitioning to comfort cares.    Therapy recommendation(s):    No further therapy is recommended.    Cris Chen, OTR/L

## 2024-07-31 NOTE — PROGRESS NOTES
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 Hospital course complicated by hypotension, delirium, and prolonged respiratory failure.  She has a tracheostomy placement on 7/3 and PEG-J tube placement with IR on 7/5.      Patient arrived on LTACH unit on 7/18 in severe respiratory distress. She complained of nausea, chest pressure, and dsypnea. She is tachypneic and breathing at 41 breaths per minute. She is tripoding and pulse ox reading in low 80's, FiO2 on vent was increased to 50%. She has severe rhonchi and rales in all lung fields, worst in RUL. She was suctioned twice and minimal secretions noted and was given xopenex. Her rhochi and rales of L lung field improved, but not the RUL. She is having severe anxiety and was given 0.5 mg of ativan IVP. She is complaining of chest pressure and tachycardic to 133. EKG done and revealed an incomplete right bundle branch block, no ST-T wave changes in contiguous leads. BP is 215/91- given 10mg of IV hydralazine, BP decreased to 185/99. Spoke with Dr. Sanches and he accepted pt back to [facility].  On 7/19, pt has improved and vitals are now stable and has been transferred back to [place].    LTACH course:  7/20-7/22: +anxiety, has seroquel as first line and ativan as second line prn.  Not sleeping well and not tolerating weaning well.  Added lantus 5 unit(s) for hyperglycemia.  Increased seroquel to 25 q8h  7/23-7/28:  Met with palliative medicine per  limited ventilator weaning. Code status updated to DNR/I OK; requesting improved anxiety control and aware this may continue to limit ventilator weaning attempts. Pancytopenia withOUT absolute neutropenia. Kecia indicating that she's tired, further explained that she is now ready for hospice. Family aware, would like to have dedicated discussion of hospice vs. Comfort care, as they would like to consider getting her to home; meeting with palliative medicine again on 7/30.    7/29: No medical changes today. HD. Palliative tomorrow?  7/30: Palliative to meet with family today, will plan for comfort care later this week. Increased lorazepam to 1mg q6h scheduled and oxycodone/dilaudid for pain as needed. Further med changes as care progresses.   7/31: Last day of HD today. Hgb 6.8, no pRBC per pt/family. No further lab draws.     Saturday conversation summary: Nasreen Woodruff, and Evelyn have had many conversations about hospice before, but acknowledge that this hospitalisation is different from before. While Kecia has not made a definitive decision on her specific goals of care (as to a change), they feel like they are mostly ready for whatever she chooses, and had questions about getting Kecia home should she chooses hospice care.    Assessment & Plan      Goals of Care  - Palliative meeting 7/30  - plan for transition to comfort care later this week   - for now remains full restorative  - HD today, likely last session, will communicate with Nephro  - Lorazepam 1mg q6h scheduled, 1mg q4h prn  - oxycodone 5mg q4h prn pain/dyspnea (first)  - dilaudid 0.3mg IV q3h prn pain/dyspnea (second)  - no further lab draws  - no blood transfusions   - Spiritual Care made aware  - will work with pulmonary to medicate and wean pt from vent when appropriate  - further orders to follow as care evolves    Acute on chronic hypoxic hypercapnic respiratory failure, improving  HAP, Stenotrophomonas maltophilia, Enterococcus faecalis, and  Candida guilliermondi complex  Severe pulmonary edema  Acute respiratory distress syndrome, resolved  Presented to the ED on 6/18/2024 with acute on chronic hypoxic hypercapnic respiratory failure. Transferred to MICU and intubated on overnight on 6/18. Workup significant for Stenotrophomonas, Enterococcus, and Candida pneumonia. Course was c/b progression to ARDS (6/21-22). Tracheostomy placed on 7/3 due to inability to wean from ventilator.  - Pulmonary toilet  - Mucomyst BID  - Hypertonic saline BID, alternate with mucomyst  - Albuterol neb QID  - Antibiotics as below  -pulmonology following for ventilator weaning (phase 2)    Anxiety  Insomnia   Patient has been having anxiety regarding pressure support trials and weaning from the ventilator. Health Psychology consulted and followed patient weekly while hospitalized. Attempts with seroquel control limited symptomatically as well as by QTc prolongation. Benzodiazepine titration/long acting calculations ongoing.  - escitalopram 10mg daily  -ativan 0.5mg PO q6h  -ativan 1mg PO q4h\prn   -ramelteon 8 mg at bedtime  -trazadone 50mg at bedtime      Recurrent right middle bronchus stenosis s/p dilation and stent (6/20/2024), stable  Has history (bronchoscopy 2/15/24) demonstrating narrowing of right mainstem transplant anastomosis and bronchus intermedius. CT chest (6/18/2024) and bronchoscopy (6/19/2024) demonstrated occlusion of right middle bronchus. With concern for post-obstructive pneumonia, interventional pulmonology was consulted, and completed bronchoscopy (6/20/2024) with tissue debulking, right middle bronchus balloon dilation to 11 mm, stent placement in right main bronchus, and bifurcating cast placement in right upper bronchus. IP pulm re-evaluated bronchial stents with BAL and noted that they were open.    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   - Prospera (7/3) 2.26  - DSA (7/3) negative  - PTA nebs  - Fluticasone-viilanterol (breo ellipta) every day - HOLD with respiratory infection  - Montelukast every day   - Immunosuppressants per Tx Pulm:   -Tacrolimus 1mg BID  - Prednisone 40mg PO every day (baseline chronic 5mg Daily)  -azathioprine - HOLD per Transplant pulmonology with repeat infections  - Peripheral smear (7/9) pending for pancytopenia  - Antibiotic prophylaxis               - Bactrim MWF for PJP ppx (monitor need to adjust pending HD plan)  - CMV weekly monitoring (qTuesday)  - Azithromycin 125mg per pulmonary, continue to monitor QTc  - Continue VGCV ppx (renally dosed, monitor need to adjust pending HD plan) with tentative plan for 3 weeks beyond completion of stress dose steroids   - Steroid Taper per Transplant Pulmonology as below:  Date Daily dose (mg)   7/11 50   7/25 45   8/8 40   8/22 35   9/5 30   9/19 25   10/17 20   11/14 15   12/12 10   1/9 5      - Hold PTA prednisone dose while completing steroid taper as above     HAP, Stenotrophomonas maltophilia, Enterococcus faecalis, Aspergillus, and Candida guilliermondi complex  Presented to the ED on 6/18/2024 with SOB and hypercapnic respiratory failure. Found on bronchoscopy (6/19) to have RML obstruction by narrowing bronchus intermedius as well as copious mucopurulent secretions/mucus plugging. Previously treated for Stenotrophonomas/aspergillus pneumonia in 11/2023 with subsequent sputum culture (2/2024) negative for both Stenotrophonomas/Aspergillus. Etiology likely repeat Stenotrophomonas infection vs opportunistic infection from colonized microbe.   - Workup  - 6/18 sputum cx: Stenotrophomonas maltophilia (ceftazidime and levofloxacin R)  - 6/19 BAL cx: Stenotrophomonas maltophilia,  Enterococcus faecalis (gentamicin R), Aspergillus (speciation in process)  - 6/21 BAL cx: Stenotrophomonas maltophilia and Enterococcus faecalis VRE  - 6/24 sputum cx: Stenotrophomonas maltophilia, Enterococcus faecalis VRE, and Candida guilliermondi complex  - Transplant ID recommendations prior to LTACH transfer:               - Continue posaconazole 300 mg/day   - Continue VGCV prophylaxis  - Weekly CMV VL (Tuesdays, next 7/9), last was 39 (7/2)         - Azithromycin per pulmonary (holding due to QTC, but will follow up)  - TMP/SMX SS daily for PJP PPx (for life given h/o PJP)  - Awaiting susceptibilities on Aspergillus per transplant ID  - Present antibiotics               - Continue posaconazole 300 mg/day per pulm transplant (6/25 to present) for candida guillermondii and asergillus ochraceus on prior BAL  - BAL fluid (7/3): pink, hazy, cell counts normal range, fluid sent for viral, bacterial and fungal cultures negative to date, cytology negative for malignancy and organisms; preliminary AFB negative     Hx Aspergillus PNA (11/2023)  Hx PJP PNA (1/2021)  Hx CMV viremia, recurrent  Hx EBV viremia  - Transplant ID consult, appreciate recs  PTA posaconazole (Treatment for hx of aspergillus PNA)  PTA azithromycin 250mg qd (CLAD ppx) (holding)  PTA bactrim (PJP ppx)  -CMV PCR 7/16 positive but <35     ESRD on iHD (T,Th,Sat)  Hypokalemia  History of ESRD 2/2 Tacrolimus toxicity on HD (MWF). With soft blood pressures, placed RIJ (6/20/2024) and started CRRT (6/20/2024). US of AVF 7/5: arterial inflow patent without inflow stenosis, normal diameter of anastamosis, venous outflow elevated velocity at subclavian- suggestive of recurrent stenosis in venous outflow (area of prior angioplasty in March). IR consulted who noted that IR fistulogram not required at that time, and was able to run iHD on beginning on 7/6 without issue.   - Nephrology consulted and following for ESRD, patient tolerating 3x weekly HD at time of  discharge  - Renally-dosed medications  - BMP daily  - HD T/ Th/ Sa schedule now  -replete electrolytes as needed         Toxic metabolic encephalopathy, resolved  Patient noted to have lethargy in setting of sepsis and delirium in the setting of high-dose steroid therapy which improved with management of underlying sepsis. Patient mental status returned to baseline at day of discharge.   -ativan 0.5mg PO q6h  -ativan 1mg PO q4h\prn   -ramelteon 8 mg at bedtime  -trazadone 50mg at bedtime   - Delirium precautions  - Do not disturb order overnight to promote sleep     Septic shock, resolved  On 6/19/2024, developed sepsis in the setting of stenotrophomonas pneumonia/enterococcus faecium VRE UTI. Resolved.     Demand Ischemia, resolved    Presented with elevated troponin (peak 499). Not associated with chest pain or hypoxia. EKG without ST elevations/depressions or T wave inversions. Suspect demand ischemia in the setting of sepsis. Will continue to monitor symptoms and continuous telemetry. EKG 7/2 sinus rhythm with PACs.     Paroxysmal A-Fib, improved  Pulmonary Hypertension   Transient arhythmia with palpitations  History of pulmonary hypertension (45 mmHg, echo 10/2023) in the setting of ILD and paroxysmal afib on PTA metoprolol tartrate BID. Not on anticoagulation for afib. Transient unspecified arhythmia overnight (7/9) with palpitations. EKG 7/9 sinus rhythm with fusion complexes and known RBBB. Repeat EKG 7/10 showing sinus rhythm with no RBBB or fusion complexes. Patient given magnesium 1g and 20mEq K+ for K 3.3 this am. No further episodes of palpitations. Currently in sinus rhythm.  - metoprolol tartrate 25mg BID     Prolonged Qtc, resolved  Repeat EKG 7/13 with QTC of 483.   - Azithromycin ppx restarted per pulm transplant  -Qtc 476 (7/17)  -Daily EKG        Severe protein calorie deficiency  PEG-J placed by IR on 7/5, continue tube feeds.   - Nutrition consulted  - Tube feeds 30ml/hr; at goal      Diarrhea, resolved  On 6/21, had 8 bowel movements. Occurred in the setting of scheduled bowel regimen and starting new antibiotics (meropenem, levofloxacin). C diff negative.   - Holding PRN bowel regimen for loose stools  - Miralax PRN   - Senna prn  - formula changes made per nutrition     Cholelithiasis with gallbladder wall thickening, resolved  During admission patient found to have up trending LFTs and fever in setting of GB wall thickening on CT abdomen/pelvis, now resolved. MRCP completed showing borderline dilated CBD up to 1.1 cm, no significant intrahepatic biliary dilation, no evidence of choledocholithiasis, no evidence of acute cholecystitis.    hyperglycemia with high dose steroids  Stress dose steroids discontinued, resume PTA prednisone 5 mg daily. Blood sugars have remained in appropriate range.  - lantus 10 unit(s) qam  -sliding scale insulin         Pyuria, Enterococcus faecium VRE and  ESBL E. Coli   Asymptomatic. With progressive IV pressor needs, obtained broad infectious workup which demonstrated UCx (6/19/2024) with Enterococcus faecium VR and ESBL E. Coli. S/p Daptomycin (6/21-6/23) and Meropenem (6/21-6/24).        Acute on Chronic Normocytic Anemia, stable  Thrombocytopenia, chronic, improving  History of chronic anemia in the setting of kidney disease (baseline Hgb 10-11). Upon admission, Hgb 9. May be bone marrow suppression in the setting of chronic illness; potential contribution by blood loss with CRRT filter changes (~150-200c). Peripheral smear (6/18/2024) without evidence of schistocytes.      FOLLOW UPS NEEDED  -pulmonology for dedicated bronchoscopy for stent visualisation (anticipated 8/15)      ANTIBIOTICS COURSES:              - s/p micafungin 150 mg/day (6/25-7/9)  - s/p IV minocycline 100mg BD x 14 days total (6/20-7/5)- for stenotrophamonas  - PO linezolid 600 mg BID x 10 days (6/25-7/5)- for VRE in urine and BAL cultures  - S/p ceftazidime (6/25-6/28)               -  S/p vancomycin (6/18-6/20)              - S/p zosyn (6/18-21)  - S/p Daptomycin (6/21-6/23)  - S/p Meropenem (6/21-6/24)  - S/p Levofloxacin (6/21-6/23)        Diet: NPO per Anesthesia Guidelines for Procedure/Surgery Except for: Meds, Ice Chips, NPO but receiving Tube Feeding  Adult Formula Drip Feeding: Continuous [place] Renal Support; Jejunostomy; Goal Rate: 40; mL/hr; Once current bag runs out, start new formula at 30 mL/hr, increase to goal rate after 8 hours    DVT Prophylaxis: Heparin SQ  Basilio Catheter: Not present  Lines: None     Cardiac Monitoring: None  Code Status: No CPR- Pre-arrest intubation OK      Clinically Significant Risk Factors              # Hypoalbuminemia: Lowest albumin = 2.6 g/dL at 7/29/2024  6:35 AM, will monitor as appropriate                 #Precipitous drop in Hgb/Hct: Lowest Hgb this hospitalization: 7.1 g/dL. Will continue to monitor and treat/transfuse as appropriate.      # Severe Malnutrition: based on nutrition assessment    # Financial/Environmental Concerns: none         Disposition Plan     Medically Ready for Discharge: Anticipated in 5+ Days             Zeus Benedict MD  Hospitalist Service  LTACH  Securely message with "[employer]" (more info)  Text page via [employer] Paging/Directory   ______________________________________________________________________    Interval History   - no acute events ovn  - hgb 6.8, no pRBC after d/w pt and family  - no further blood draws per pt/family  - pt comfortable  - increased dose of lorazepam scheduled helped y/d, discussed making sure she gets prn doses as well  - tentative plan is for Comfort Care to start on Friday  - no other acute concerns    Physical Exam   Vital Signs: Temp: 97.7  F (36.5  C) Temp src: Oral BP: 136/63 Pulse: 72   Resp: 21 SpO2: 100 % O2 Device: Mechanical Ventilator    Weight: 116 lbs 2.92 oz    General:  No acute distress, cachectic , temporal muscle wasting  Eyes:  Sclera non icteric, normal conjuctiva  Neck :   Supple, no lymphadenopathy, trach in place   Lungs: Rhonchi noted in all lung fields   CVS:  S1 and S2, regular rate and rhythem  Abdomen:  Soft, nontender, PEG in place   Musculoskeletal:  No pain or swelling.  No edema  Neuro:  Alert and oriented.  No acute deficit     Medical Decision Making       50 MINUTES SPENT BY ME on the date of service doing chart review, history, exam, documentation & further activities per the note.  MANAGEMENT DISCUSSED with the following over the past 24 hours:     NOTE(S)/MEDICAL RECORDS REVIEWED over the past 24 hours:    Tests ORDERED & REVIEWED in the past 24 hours:  - See lab/imaging results included in the data section of the note      Data   Imaging results reviewed over the past 24 hrs:   No results found for this or any previous visit (from the past 24 hour(s)).      Most Recent 3 CBC's:  Recent Labs   Lab Test 07/29/24  0635 07/27/24  0633 07/26/24  0635   WBC 2.5* 3.8* 4.0   HGB 7.4* 8.1* 7.9*   * 110* 109*    202 183     Most Recent 3 BMP's:  Recent Labs   Lab Test 07/31/24  0545 07/31/24  0011 07/30/24  1835 07/29/24  0727 07/29/24  0635 07/25/24  2349 07/25/24 2001 07/23/24  0617 07/23/24  0554 07/22/24  0900 07/22/24  0631 07/20/24  1209 07/20/24  0650   NA  --   --   --   --  135  --  133*  --  138   < > 134*   < > 133*   POTASSIUM  --   --   --   --  3.6  --  4.7  --  3.3*   < > 3.8   < > 4.4   CHLORIDE  --   --   --   --  96*  --   --   --   --   --  96*  --  93*   CO2  --   --   --   --  27  --   --   --   --   --  28  --  27   BUN  --   --   --   --  86.4*  --   --   --   --   --  54.1*  --  61.6*   CR  --   --   --   --  2.61*  --   --   --   --   --  2.11*  --  2.49*   ANIONGAP  --   --   --   --  12  --   --   --   --   --  10  --  13   CHELSEY  --   --   --   --  7.4*  --   --   --   --   --  8.5*  --  8.6*   * 211* 242*   < > 177*   < > 204*   < > 156*   < > 199*   < > 200*    < > = values in this interval not displayed.     Most Recent 2  LFT's:  Recent Labs   Lab Test 07/29/24  0635 07/22/24  0631   AST 29 25   ALT 37 38   ALKPHOS 382* 287*   BILITOTAL 0.4 0.5     Most Recent 3 INR's:  Recent Labs   Lab Test 06/18/24  1418 03/05/24  0925 02/14/24  1050   INR 0.98 0.98 0.96

## 2024-07-31 NOTE — PROGRESS NOTES
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  Hemodialysis dependent since 2019.  Historically ran at [facility].  Has left upper extremity AVG with good reported function.  Patient transitioning to comfort cares, last HD treatment today.  Patient request to our treatment for her last round.  HD this morning was uneventful, we will sign off.    ACD: Dosing EPO while inpatient.  Increasing dose to 7000 units 3 times weekly today.    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    HTN: Monotherapy with Coreg.  Blood pressure acceptable.    S/p bilateral lung transplant 2018: Immunosuppressed with tacrolimus, azathioprine, prednisone.  Infection prophylaxis Bactrim, azithromycin, valganciclovir renally dosed  Management per transplant pulmonologist    Malnutrition: Tube feeds    GOC: Transition to comfort cares after dialysis 7/31/2024    SUBJECTIVE:    Patient was seen bedside  Just finished her dialysis treatment  Dialysis RN present  Her  also present during encounter  Reviewed palliative care note and recommendations, transition to comfort cares after dialysis today.  Patient requested shorter treatment time of 2 hours today  Reiterated goals of care, they are comfortable with discontinuing dialysis  All questions answered  We will sign off  OBJECTIVE:  Physical Exam   Temp: 98.2  F (36.8  C) Temp src: Oral BP: 127/69 Pulse: 88   Resp: 25 SpO2: 100 % O2 Device: Mechanical Ventilator Oxygen Delivery: 3 LPM  Vitals:    07/29/24 0509 07/30/24 0500 07/31/24 0506   Weight: 53.3 kg (117 lb 8.1 oz) 52 kg (114 lb 10.2 oz) 52.7 kg (116 lb 2.9 oz)     Vital Signs with Ranges  Temp:  [97.7  F (36.5  C)-98.4  F (36.9  C)] 98.2  F (36.8  C)  Pulse:  [72-88] 88  Resp:  [21-32] 25  BP: (116-150)/(59-74) 127/69  Cuff Mean (mmHg):  [81-92] 92  FiO2 (%):  [25 %] 25 %  SpO2:  [98 %-100 %] 100 %  I/O last 3 completed shifts:  In: 778 [NG/GT:778]  Out: -     Patient Vitals for the past  72 hrs:   Weight   07/31/24 0506 52.7 kg (116 lb 2.9 oz)   07/30/24 0500 52 kg (114 lb 10.2 oz)   07/29/24 0509 53.3 kg (117 lb 8.1 oz)     Intake/Output Summary (Last 24 hours) at 7/29/2024 1050  Last data filed at 7/29/2024 0900  Gross per 24 hour   Intake 1140 ml   Output --   Net 1140 ml       PHYSICAL EXAM:  General: Alert, NAD  Cardiovascular: Heart rate is controlled  Extremities: No peripheral edema  Gastrointestinal: Soft, non-tender, non-distended  Access: Left upper aVF, thrill and bruit present    LABORATORY STUDIES:     Recent Labs   Lab 07/31/24  0703 07/29/24  0635 07/27/24  0633 07/26/24  0635 07/25/24 2001 07/25/24  0642   WBC 3.0* 2.5* 3.8* 4.0  --  3.8*   RBC 2.00* 2.21* 2.42* 2.27*  --  2.28*   HGB 6.8* 7.4* 8.1* 7.9* 8.4* 7.6*   HCT 21.8* 23.8* 26.7* 24.8*  --  24.8*    187 202 183  --  157       Basic Metabolic Panel:  Recent Labs   Lab 07/31/24  1156 07/31/24  0545 07/31/24  0011 07/30/24  1835 07/30/24  1214 07/30/24  0553 07/29/24  0727 07/29/24  0635 07/25/24  2349 07/25/24 2001   NA  --   --   --   --   --   --   --  135  --  133*   POTASSIUM  --   --   --   --   --   --   --  3.6  --  4.7   CHLORIDE  --   --   --   --   --   --   --  96*  --   --    CO2  --   --   --   --   --   --   --  27  --   --    BUN  --   --   --   --   --   --   --  86.4*  --   --    CR  --   --   --   --   --   --   --  2.61*  --   --    * 148* 211* 242* 217* 176*   < > 177*   < > 204*   CHELSEY  --   --   --   --   --   --   --  7.4*  --   --     < > = values in this interval not displayed.       INRNo lab results found in last 7 days.     Recent Labs   Lab Test 07/31/24  0703 07/29/24  0635 06/18/24  1803 06/18/24  1418 03/05/24  0925   INR  --   --   --  0.98 0.98   WBC 3.0* 2.5*   < > 4.5 1.0*   HGB 6.8* 7.4*   < > 12.1 10.5*    187   < > 147* 96*    < > = values in this interval not displayed.       Personally reviewed current labs    This note was dictated using voice recognition     Chalo  JONI Aldridge  [facility]  [phone]

## 2024-07-31 NOTE — PLAN OF CARE
Problem: Adult Inpatient Plan of Care  Goal: Plan of Care Review  Description: The Plan of Care Review/Shift note should be completed every shift.  The Outcome Evaluation is a brief statement about your assessment that the patient is improving, declining, or no change.  This information will be displayed automatically on your shift  note.  Outcome: Progressing  Flowsheets (Taken 7/30/2024 2341)  Plan of Care Reviewed With:   patient   family  Overall Patient Progress: declining     Problem: Mechanical Ventilation Invasive  Goal: Absence of Device-Related Skin and Tissue Injury  Outcome: Progressing  Intervention: Maintain Skin and Tissue Health  Recent Flowsheet Documentation  Taken 7/30/2024 1828 by Kerrie Cline RN  Device Skin Pressure Protection: absorbent pad utilized/changed     Problem: Anxiety Signs/Symptoms  Goal: Optimized Energy Level (Anxiety Signs/Symptoms)  Outcome: Progressing     Problem: Pain Acute  Goal: Optimal Pain Control and Function  Outcome: Progressing  Intervention: Prevent or Manage Pain  Recent Flowsheet Documentation  Taken 7/30/2024 1828 by Kerrie Cline RN  Sensory Stimulation Regulation: television on  Sleep/Rest Enhancement:   family presence promoted   regular sleep/rest pattern promoted  Bowel Elimination Promotion: (PRN  Immodium  and   Banatrol scheduled this  shift.)   adequate fluid intake promoted   other (see comments)  Complementary Therapy: (watching  TV) other (see comments)  Medication Review/Management: medications reviewed  Intervention: Optimize Psychosocial Wellbeing  Recent Flowsheet Documentation  Taken 7/30/2024 1828 by Kerrie Cline RN  Spiritual Activities Assistance: other (see comments)  Supportive Measures:   active listening utilized   positive reinforcement provided     Problem: Enteral Nutrition  Goal: Absence of Aspiration Signs and Symptoms  Outcome: Progressing  Intervention: Minimize Aspiration Risk  Recent Flowsheet  Documentation  Taken 7/30/2024 1828 by Kerrie Cline RN  Enteral Feeding Safety: tubing labeled as enteral feeding  Oral Care: swabbed with antiseptic solution  Goal: Safe, Effective Therapy Delivery  Outcome: Progressing  Intervention: Prevent Feeding-Related Adverse Events  Recent Flowsheet Documentation  Taken 7/30/2024 1828 by Kerrie Cline RN  Enteral Feeding Safety: tubing labeled as enteral feeding  Goal: Feeding Tolerance  Outcome: Progressing  Intervention: Prevent and Manage Feeding Intolerance  Recent Flowsheet Documentation  Taken 7/30/2024 1828 by Kerrie Cline RN  Nutrition Support Management: weight trending reviewed     Problem: Wound  Goal: Optimal Coping  Outcome: Progressing  Intervention: Support Patient and Family Response  Recent Flowsheet Documentation  Taken 7/30/2024 1828 by Kerrie Cline RN  Supportive Measures:   active listening utilized   positive reinforcement provided  Family/Support System Care: support provided  Goal: Absence of Infection Signs and Symptoms  Outcome: Progressing  Intervention: Prevent or Manage Infection  Recent Flowsheet Documentation  Taken 7/30/2024 1828 by Kerrie Cline RN  Infection Management: aseptic technique maintained  Isolation Precautions: contact precautions maintained     Goal Outcome Evaluation:      Plan of Care Reviewed With: patient, family    Overall Patient Progress: decliningOverall Patient Progress: declining     Patient  alert  and  oriented this  evening.  She  is  non-verbal  due  to  trache  tube hooked  to  mechanical  ventilator but  able  to  mouth  words  and  nod   agreements  to  simple  questions.  She  and  her  family  had  a  Care   Conference  with    from  Spiritual  Services about  patient's  imminent  death.   offered  resources  to  reach out  to  in  behalf  of  the  family.  Her  systolic  BPs  this  shift  ranged  from   132 - 150 mm Hg; RRs  between  23 - 29/minute  this shift  while  on  mechanical  vent  at  25%  FiO2. She  was  given  her  scheduled  Coreg  at  bedtime.  Schedule  Ativan  1  mg  was  sufficient  to  to  allay  her  anxiety.  Her  sacral  area  was   reddened  and  denuded.  Barrier  agents  were  applied  for  each  incontinent  episodes.  Scheduled  Banatrol  was  given   at  bedtime  for  her  2  loose  Bms.  Will  keep  monitoring  patient's progress and  safety.

## 2024-07-31 NOTE — PLAN OF CARE
Problem: Mechanical Ventilation Invasive  Goal: Mechanical Ventilation Liberation  Outcome: Not Progressing     Problem: Mechanical Ventilation Invasive  Goal: Optimal Device Function  Outcome: Progressing  Intervention: Optimize Device Care and Function  Recent Flowsheet Documentation  Taken 7/31/2024 1609 by Jailyn Cuadra RT  Airway Safety Measures: all equipment/monitors on and audible  Taken 7/31/2024 1521 by Jailyn Cuadra RT  Airway Safety Measures: all equipment/monitors on and audible  Taken 7/31/2024 1508 by Jailyn Cuadra RT  Airway Safety Measures: all equipment/monitors on and audible  Taken 7/31/2024 1122 by Jailyn Cuadra RT  Airway Safety Measures: all equipment/monitors on and audible  Taken 7/31/2024 0845 by Jailyn Cuadra RT  Airway Safety Measures: all equipment/monitors on and audible  Goal: Absence of Device-Related Skin and Tissue Injury  Outcome: Progressing  Goal: Absence of Ventilator-Induced Lung Injury  Outcome: Progressing

## 2024-08-01 NOTE — PLAN OF CARE
Problem: Adult Inpatient Plan of Care  Goal: Plan of Care Review  Description: The Plan of Care Review/Shift note should be completed every shift.  The Outcome Evaluation is a brief statement about your assessment that the patient is improving, declining, or no change.  This information will be displayed automatically on your shift  note.  Outcome: Progressing  Goal: Absence of Hospital-Acquired Illness or Injury  Intervention: Prevent Infection  Recent Flowsheet Documentation  Taken 8/1/2024 0004 by Loevly Price RN  Infection Prevention:   personal protective equipment utilized   hand hygiene promoted   rest/sleep promoted  Goal: Optimal Comfort and Wellbeing  Intervention: Provide Person-Centered Care  Recent Flowsheet Documentation  Taken 8/1/2024 0004 by Lovely Price RN  Trust Relationship/Rapport:   care explained   choices provided   Goal Outcome Evaluation:       Patient was A/O  x 4 and vital signs remain stable, patient denied pain and slept most of the shift, was suctioned x 2 small white thick/thin secretion, patient was repositioned  every 2 hours, spouse at the bed side giving support, patient BS was checked and correction insulin given , all other patient needs were attended to.

## 2024-08-01 NOTE — PLAN OF CARE
Problem: Adult Inpatient Plan of Care  Goal: Optimal Comfort and Wellbeing  Outcome: Progressing  Intervention: Provide Person-Centered Care  Recent Flowsheet Documentation  Taken 7/31/2024 2200 by Janis Martin, RN  Trust Relationship/Rapport: care explained      Pt VSS. Alert and oriented x 4.   Denies pain.  Denies nausea, dizziness, shortness of breath and lightheadedness. On vent.  Pt resting in bed this shift and is moved with ellie.  Adequate intake and output.  On TF diet  . Last BM: today.  Incontinent of bowel and bladder; bowels are loose and pt is on hemodialysis. Skin is red and edematous on labia; abdomen is bruised.  Uses call light appropriately.  Mood anxious but accepting of situation; pt family visiting today and  is sleeping in bed with pt tonight.  Continue to monitor.     BP (!) 142/67   Pulse 82   Temp 98.1  F (36.7  C) (Oral)   Resp 25   Wt 52.7 kg (116 lb 2.9 oz)   LMP 06/07/2014 (Exact Date)   SpO2 100%   BMI 20.58 kg/m        Janis Martin,   RN

## 2024-08-01 NOTE — PLAN OF CARE
Problem: Adult Inpatient Plan of Care  Goal: Optimal Comfort and Wellbeing  Outcome: Progressing  Intervention: Monitor Pain and Promote Comfort  Recent Flowsheet Documentation  Taken 8/1/2024 0949 by Earline Cohen RN  Pain Management Interventions: medication (see MAR)  Intervention: Provide Person-Centered Care  Recent Flowsheet Documentation  Taken 8/1/2024 0930 by Earline Cohen RN  Trust Relationship/Rapport: care explained     Problem: Anxiety Signs/Symptoms  Goal: Optimized Energy Level (Anxiety Signs/Symptoms)  Outcome: Progressing     Problem: Pain Acute  Goal: Optimal Pain Control and Function  Outcome: Progressing  Intervention: Develop Pain Management Plan  Recent Flowsheet Documentation  Taken 8/1/2024 0949 by Earline Cohen RN  Pain Management Interventions: medication (see MAR)  Intervention: Prevent or Manage Pain  Recent Flowsheet Documentation  Taken 8/1/2024 0930 by Earline Cohen RN  Sensory Stimulation Regulation: care clustered  Sleep/Rest Enhancement: family presence promoted  Medication Review/Management: medications reviewed  Intervention: Optimize Psychosocial Wellbeing  Recent Flowsheet Documentation  Taken 8/1/2024 0930 by Earline Cohen RN  Supportive Measures: active listening utilized   Goal Outcome Evaluation:         Alert and able to communicate her needs.On vent. Requested PRN Oxycodone 5 mg for generalized pain rated at 1/10.Patient said it also helps with her anxiety.All due medications and cares are done.Tolerating tube feeding.No bowel movement this shift.Had one urine incontinence.  Family are visiting.

## 2024-08-01 NOTE — PLAN OF CARE
Problem: Mechanical Ventilation Invasive  Goal: Optimal Device Function  Outcome: Progressing  Goal: Mechanical Ventilation Liberation  Outcome: Progressing  Goal: Absence of Device-Related Skin and Tissue Injury  Outcome: Progressing  Goal: Absence of Ventilator-Induced Lung Injury  Outcome: Progressing   Goal Outcome Evaluation:       RT PROGRESS NOTE     DATA:     CURRENT SETTINGS: AC 18, 360, +5, 25%             TRACH TYPE / SIZE:#6 SHILEY TaperGuard Placed on 7/8/24             MODE:AC             FIO2:25%     ACTION:             THERAPIES: Xopenex neb prn             SUCTION:                           FREQUENCY: X 3                        AMOUNT: Small to Moderate                         CONSISTENCY: thick/thin                        COLOR: white/pale yellow              SPONTANEOUS COUGH EFFORT/STRENGTH OF EFFORT (not elicited by suctioning): fair                              WEANING PHASE:3                        WEAN MODE:                         WEAN TIME:none                         END WEAN REASON:      RESPONSE:             BS:Coarse ,Rhonchi             VITAL SIGNS:Sat %, HR 74-82 , RR 21-27             EMOTIONAL NEEDS / CONCERNS: Anxiety, Anxious                 RISK FOR SELF DECANNULATION: no                        RISK DUE TO: no                        INTERVENTION/S IN PLACE IS/ARE:         NOTE / PLAN: Cont. Current plan of care/ Prn neb given x1  per patient request

## 2024-08-01 NOTE — TELEPHONE ENCOUNTER
Prior Authorization Approval    Medication: POSACONAZOLE 100 MG PO [facility]  Authorization Effective Date: 5/2/2024  Authorization Expiration Date: 1/31/2025  Approved Dose/Quantity: ud  Reference #:     Insurance Company: [employer] - Phone [phone] Fax [phone]  CoPay Card Available: No    Financial Assistance Needed: no  Which Pharmacy is filling the prescription:    Pharmacy Notified: [facility]  Patient Notified: [facility]- [facility]

## 2024-08-01 NOTE — PROGRESS NOTES
Pulmonary Progress Note    Admit Date: 7/19/2024  CODE: No CPR- Pre-arrest intubation OK    Assessment/Plan:     Problems:  Acute on chronic hypoxic and hypercapnic respiratory failure suspect d/t post obstructive multi-lobar pneumonia 2/2 right bronchus stenosis s/p stent RMB to BI & bifurcating stent in RUL. Completed antibiotics. S/p trach on prolonged mechanical ventilation. Remains on minimal vent settings. Poor weaning with PS and TM largely it seemed d/t crippling anxiety. Family and patient are now pursuing comfort measures and working with palliative care team.   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 EBV 1680 and log 3.2 on 7/22.  Repeat EBG 7/29 <35 with log 1.5  ESRD on iHD  Anxiety with panic attacks  Pancytopenia: worsening 7/22 s/p Neupagen x1 7/23: Med related? On VGCV as above. Accelerated prednisone taper. CMV still <35 w log 1.5 so continuing VGCV. Counts today ok but trending down    Plan/Recommendations:  Planning to transition to comfort with vent removal on Friday per family.  Ok to cap patient if she requests, otherwise keep on AC support until Friday   Patient/family requesting Nebs be changed to PRN(done)  Opioids and benzos available for symptom management.  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 The patient was admitted on 6/18/24 for progressive hypoxia with dyspnea and worsening hypercapnia in ED requiring intubation likely d/t post-obstructive PNA 2/2 right bronchus stenosis. S/p IP bronch (6/20) with laser tissue debulking RMB stenosis, airway balloon dilation of RMB and BI, and placement of stent RMB to BI and bifurcating stent in RUL. S/p CRRT (6/20-7/2), iHD resumed 7/4. Recurrence of paroxysmal afib with RVR first noted 6/25. S/p trach with ENT on 7/3 and GJ tube with IR on 7/5. Treating empirically for possible immune/inflammatory process vs organizing pneumonia (given GG changes on CT) with steroid burst/taper. Gradual improvement in PST on ventilator. She was discharged to LTACH 7/18/024 in the afternoon but was readmitted to MICU the same evening for suspected acute on chronic hypoxic respiratory failure, but likely exacerbated by severe anxiety episode. Transferred back to LTAC 7/19.     Inteval hx/Subjective:  Capped for 13min yesterday for voicing  Remains on minimal vent settings    Still with some dyspnea and anxiety on vent(recently received PRN), otherwise doing ok     Tracheal secretions: suctioned 8x in past 24hr for small to moderate amounts, thick     Clinical status discussed today with respiratory therapist    ROS: 10-system review obtained and negative with the exception of the symptoms noted above.    Medications:     Current Facility-Administered Medications   Medication Dose Route Frequency Provider Last Rate Last Admin    dextrose 10% infusion   Intravenous  Continuous PRN Dulce Hardy MD 30 mL/hr at 07/25/24 0009 1,000 mL at 07/25/24 0009    Patient is already receiving anticoagulation with heparin, enoxaparin (LOVENOX), warfarin (COUMADIN)  or other anticoagulant medication   Does not apply Continuous PRN Dulce Hardy MD        Stop Heparin 60 minutes before end of treatment   Does not apply Continuous PRN Ioana Ho MD         Current Facility-Administered Medications   Medication Dose Route Frequency Provider Last Rate Last Admin    azithromycin (ZITHROMAX) suspension 125 mg  125 mg Per J Tube Daily Dulce Hardy MD   125 mg at 08/01/24 0847    B and C vitamin Complex with folic acid (NEPHRONEX) liquid 5 mL  5 mL Per Feeding Tube Daily Dulce Hardy MD   5 mL at 08/01/24 0847    carvedilol (COREG) tablet 6.25 mg  6.25 mg Per Feeding Tube BID Casa Chaudhari MD   6.25 mg at 08/01/24 0847    chlorhexidine (PERIDEX) 0.12 % solution 15 mL  15 mL Mouth/Throat Q12H Dulce Hardy MD   15 mL at 08/01/24 0847    epoetin alisha-epbx (RETACRIT) injection 7,000 Units  7,000 Units Intravenous Once per day on Monday Wednesday Friday Chalo Aldridge PA-C   7,000 Units at 07/31/24 1225    escitalopram (LEXAPRO) solution 10 mg  10 mg Per Feeding Tube Daily Eyad Harding, ADRIÁN CNP   10 mg at 08/01/24 0847    fiber modular (BANATROL TF) packet 1 packet  1 packet Per Feeding Tube TID Dulce Hardy MD   1 packet at 08/01/24 0848    heparin ANTICOAGULANT injection 5,000 Units  5,000 Units Subcutaneous Q8H Dulce Hardy MD   5,000 Units at 08/01/24 0613    insulin aspart (NovoLOG) injection (RAPID ACTING)  1-12 Units Subcutaneous Q6H Dulce Hardy MD   1 Units at 08/01/24 0612    insulin glargine (LANTUS PEN) injection 10 Units  10 Units Subcutaneous QAM AC Casa Chaudhari MD   10 Units at 08/01/24 0847    lidocaine (PF) (XYLOCAINE) 1 % injection 5 mL  5 mL Subcutaneous During Dialysis/CRRT (from stock) Oly,  JONI Isabel   5 mL at 07/31/24 1228    LORazepam (ATIVAN) 2 MG/ML (HIGH CONC) oral solution 1 mg  1 mg Per G Tube Q6H Antoinette Sanchez CNS   1 mg at 08/01/24 0613    montelukast (SINGULAIR) tablet 10 mg  10 mg Per J Tube At Bedtime Dulce Hardy MD   10 mg at 07/31/24 2043    pantoprazole (PROTONIX) 2 mg/mL suspension 40 mg  40 mg Per J Tube At Bedtime Dulce Hardy MD   40 mg at 07/31/24 2054    posaconazole (NOXAFIL) DR tablet TBEC 300 mg  300 mg Per Feeding Tube QAM Dulce Hardy MD   300 mg at 07/31/24 1452    predniSONE (DELTASONE) tablet 40 mg  40 mg Per J Tube Daily Eyad Harding APRN CNP   40 mg at 08/01/24 0847    Prosource TF20 ENfit Compatibl EN LIQD (PROSOURCE TF20) packet 60 mL  1 packet Per Feeding Tube Daily Dulce Hardy MD   60 mL at 08/01/24 0848    ramelteon (ROZEREM) tablet 8 mg  8 mg Per J Tube At Bedtime Dulce Hardy MD   8 mg at 07/31/24 2044    sodium chloride (PF) 0.9% PF flush 3 mL  3 mL Intracatheter Q8H Dulce Hardy MD   3 mL at 08/01/24 0613    sulfamethoxazole-trimethoprim (BACTRIM/SEPTRA) suspension 80 mg  10 mL Per Feeding Tube Once per day on Tuesday Thursday Saturday Dulce Hardy MD   80 mg at 07/30/24 2119    tacrolimus (GENERIC) suspension 1 mg  1 mg Per G Tube BID IS Dulce Hardy MD   1 mg at 08/01/24 0847    traZODone (DESYREL) tablet 50 mg  50 mg Per J Tube At Bedtime Casa Chaudhari MD   50 mg at 07/31/24 2044    valGANciclovir (VALCYTE) solution 450 mg  450 mg Per J Tube Once per day on Tuesday Saturday Dulce Hardy MD   450 mg at 07/30/24 2119    Vitamin D3 (CHOLECALCIFEROL) tablet 50 mcg  50 mcg Per J Tube Once per day on Monday Wednesday Friday Dulce Hardy MD   50 mcg at 07/31/24 0857         Exam/Data:   Vitals  /61 (BP Location: Right arm)   Pulse 83   Temp 98.3  F (36.8  C) (Oral)   Resp 23   Wt 52.6 kg (115 lb 15.4 oz)   LMP 06/07/2014 (Exact Date)   SpO2 100%   BMI 20.54  kg/m       I/O last 3 completed shifts:  In: 690 [NG/GT:690]  Out: 1000 [Other:1000]  Weight change: -0.1 kg (-3.5 oz)    Vent Mode: CMV/AC  FiO2 (%): 25 %  Resp Rate (Set): 18 breaths/min  Tidal Volume (Set, mL): 360 mL  PEEP (cm H2O): 5 cmH2O  Resp: 23      EXAM:  Gen: No acute distress on AC, looks tired  HEENT: NT, trach midline/intact  CV: RRR, no m/g/r  Resp: coarse on R, clear on L; non-labored; no wheeze   Abd: soft, nontender, BS+  Skin: no visible rashes or lesions  Ext: no edema  Neuro: alert, follows commands     Labs:  Complete Blood Count   Recent Labs   Lab 07/31/24  0703 07/29/24  0635 07/27/24  0633 07/26/24  0635   WBC 3.0* 2.5* 3.8* 4.0   HGB 6.8* 7.4* 8.1* 7.9*    187 202 183     Basic Metabolic Panel  Recent Labs   Lab 08/01/24  0557 08/01/24  0003 07/31/24  1839 07/31/24  1156 07/29/24  0727 07/29/24  0635 07/25/24  2349 07/25/24 2001   NA  --   --   --   --   --  135  --  133*   POTASSIUM  --   --   --   --   --  3.6  --  4.7   CHLORIDE  --   --   --   --   --  96*  --   --    CO2  --   --   --   --   --  27  --   --    BUN  --   --   --   --   --  86.4*  --   --    CR  --   --   --   --   --  2.61*  --   --    * 175* 211* 189*   < > 177*   < > 204*    < > = values in this interval not displayed.     Liver Function Tests  Recent Labs   Lab 07/29/24  0635   AST 29   ALT 37   ALKPHOS 382*   BILITOTAL 0.4   ALBUMIN 2.6*     Venous Blood Gas  Recent Labs   Lab 07/25/24 2001   PHV 7.32   PCO2V 59*   PO2V 33   HCO3V 27   LASHAUN 4.6*       Radiology: Personally reviewed; radiology read below    XR CHEST PORT 7/29/2024  Tracheostomy and right mainstem bronchus stent are unchanged. Slightly increased left lower lobe atelectasis with scattered right lung atelectasis. No enlarging pleural effusion with similar left posterior pleural calcification. Stable heart size and mediastinal contours. Percutaneous gastrojejunostomy tube.    XR CHEST PORT 7/23/2024  Tracheostomy tube remains in place.  Stable postoperative changes of sternotomy, right thoracotomy and hilar stent placement. Heart size is within normal limits. Triangular opacity in the inferomedial left hemithorax is unchanged. Mixed interstitial and airspace opacity in the left lower lung field is similar to previous. No new focal consolidation. No pleural effusion or pneumothorax. Gastrostomy in place.    XR CHEST PORT 1 VIEW 7/18/2024   Frontal view of the chest. Stable tracheostomy tube. Stable right-sided bronchial stents. Prior sternotomy. Stable heart size. Midline trachea. Improved aeration throughout the left lung. No pneumothorax. Right chest wall/pectoral surgical clips.                                                      Chest CT  7/17/2024                                                                IMPRESSION:   1. Overall improved appearance of the confluent groundglass opacities  primarily affecting the bilateral lower lobes. There is a more  appreciable improvement in the right middle lobe and lingula with  relative sparing of the apices.   2. Status post bilateral lung transplantation with stable support  devices.  3. Cholelithiasis.  4. Ectatic ascending aorta measuring up to 4.0 cm.  5. Atherosclerosis.    Eyad Harding CNP  Pulmonary Medicine  [facility]  Pager [phone]  Office: [phone]

## 2024-08-01 NOTE — PROGRESS NOTES
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 Hospital course complicated by hypotension, delirium, and prolonged respiratory failure.  She has a tracheostomy placement on 7/3 and PEG-J tube placement with IR on 7/5.      Patient arrived on LTACH unit on 7/18 in severe respiratory distress. She complained of nausea, chest pressure, and dsypnea. She is tachypneic and breathing at 41 breaths per minute. She is tripoding and pulse ox reading in low 80's, FiO2 on vent was increased to 50%. She has severe rhonchi and rales in all lung fields, worst in RUL. She was suctioned twice and minimal secretions noted and was given xopenex. Her rhochi and rales of L lung field improved, but not the RUL. She is having severe anxiety and was given 0.5 mg of ativan IVP. She is complaining of chest pressure and tachycardic to 133. EKG done and revealed an incomplete right bundle branch block, no ST-T wave changes in contiguous leads. BP is 215/91- given 10mg of IV hydralazine, BP decreased to 185/99. Spoke with Dr. Sanches and he accepted pt back to [facility].  On 7/19, pt has improved and vitals are now stable and has been transferred back to [place].    LTACH course:  7/20-7/22: +anxiety, has seroquel as first line and ativan as second line prn.  Not sleeping well and not tolerating weaning well.  Added lantus 5 unit(s) for hyperglycemia.  Increased seroquel to 25 q8h  7/23-7/28:  Met with palliative medicine per  limited ventilator weaning. Code status updated to DNR/I OK; requesting improved anxiety control and aware this may continue to limit ventilator weaning attempts. Pancytopenia withOUT absolute neutropenia. Kecia indicating that she's tired, further explained that she is now ready for hospice. Family aware, would like to have dedicated discussion of hospice vs. Comfort care, as they would like to consider getting her to home; meeting with palliative medicine again on 7/30.    7/29: No medical changes today. HD. Palliative tomorrow?  7/30: Palliative to meet with family today, will plan for comfort care later this week. Increased lorazepam to 1mg q6h scheduled and oxycodone/dilaudid for pain as needed. Further med changes as care progresses.   7/31: Last day of HD today. Hgb 6.8, no pRBC per pt/family. No further lab draws.   8/1: Stable and comfortable today. Spiritual Care informs that a  will be coming tomorrow to provide communion.     Saturday conversation summary: Nasreen Woodruff, and Evelyn have had many conversations about hospice before, but acknowledge that this hospitalisation is different from before. While Kecia has not made a definitive decision on her specific goals of care (as to a change), they feel like they are mostly ready for whatever she chooses, and had questions about getting Kecia home should she chooses hospice care.    Assessment & Plan      Goals of Care  - Palliative meeting 7/30  - plan for transition to comfort care later this week   - for now remains full restorative  - HD today, likely last session, will communicate with Nephro  - Lorazepam 1mg q6h scheduled, 1mg q4h prn  - oxycodone 5mg q4h prn pain/dyspnea (first)  - dilaudid 0.3mg IV q3h prn pain/dyspnea (second)  - no further lab draws  - no blood transfusions   - nebs to prn  - Spiritual Care made aware  - will work with pulmonary to medicate and wean pt from vent when appropriate  - further orders to follow as care  evolves    Acute on chronic hypoxic hypercapnic respiratory failure, improving  HAP, Stenotrophomonas maltophilia, Enterococcus faecalis, and Candida guilliermondi complex  Severe pulmonary edema  Acute respiratory distress syndrome, resolved  Presented to the ED on 6/18/2024 with acute on chronic hypoxic hypercapnic respiratory failure. Transferred to MICU and intubated on overnight on 6/18. Workup significant for Stenotrophomonas, Enterococcus, and Candida pneumonia. Course was c/b progression to ARDS (6/21-22). Tracheostomy placed on 7/3 due to inability to wean from ventilator.  - Pulmonary toilet  - Mucomyst BID  - Hypertonic saline BID, alternate with mucomyst  - Albuterol neb QID  - Antibiotics as below  -pulmonology following for ventilator weaning (phase 2)    Anxiety  Insomnia   Patient has been having anxiety regarding pressure support trials and weaning from the ventilator. Health Psychology consulted and followed patient weekly while hospitalized. Attempts with seroquel control limited symptomatically as well as by QTc prolongation. Benzodiazepine titration/long acting calculations ongoing.  - escitalopram 10mg daily  -ativan 0.5mg PO q6h  -ativan 1mg PO q4h\prn   -ramelteon 8 mg at bedtime  -trazadone 50mg at bedtime      Recurrent right middle bronchus stenosis s/p dilation and stent (6/20/2024), stable  Has history (bronchoscopy 2/15/24) demonstrating narrowing of right mainstem transplant anastomosis and bronchus intermedius. CT chest (6/18/2024) and bronchoscopy (6/19/2024) demonstrated occlusion of right middle bronchus. With concern for post-obstructive pneumonia, interventional pulmonology was consulted, and completed bronchoscopy (6/20/2024) with tissue debulking, right middle bronchus balloon dilation to 11 mm, stent placement in right main bronchus, and bifurcating cast placement in right upper bronchus. IP pulm re-evaluated bronchial stents with BAL and noted that they were open.    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   - Prospera (7/3) 2.26  - DSA (7/3) negative  - PTA nebs  - Fluticasone-viilanterol (breo ellipta) every day - HOLD with respiratory infection  - Montelukast every day   - Immunosuppressants per Tx Pulm:   -Tacrolimus 1mg BID  - Prednisone 40mg PO every day (baseline chronic 5mg Daily)  -azathioprine - HOLD per Transplant pulmonology with repeat infections  - Peripheral smear (7/9) pending for pancytopenia  - Antibiotic prophylaxis               - Bactrim MWF for PJP ppx (monitor need to adjust pending HD plan)  - CMV weekly monitoring (qTuesday)  - Azithromycin 125mg per pulmonary, continue to monitor QTc  - Continue VGCV ppx (renally dosed, monitor need to adjust pending HD plan) with tentative plan for 3 weeks beyond completion of stress dose steroids   - Steroid Taper per Transplant Pulmonology as below:  Date Daily dose (mg)   7/11 50   7/25 45   8/8 40   8/22 35   9/5 30   9/19 25   10/17 20   11/14 15   12/12 10   1/9 5      - Hold PTA prednisone dose while completing steroid taper as above     HAP, Stenotrophomonas maltophilia, Enterococcus faecalis, Aspergillus, and Candida guilliermondi complex  Presented to the ED on 6/18/2024 with SOB and hypercapnic respiratory failure. Found on bronchoscopy (6/19) to have RML obstruction by narrowing bronchus intermedius as well as copious mucopurulent secretions/mucus plugging. Previously treated for Stenotrophonomas/aspergillus pneumonia in 11/2023 with subsequent sputum culture (2/2024) negative for both Stenotrophonomas/Aspergillus. Etiology likely repeat Stenotrophomonas infection vs opportunistic infection from colonized microbe.    - Workup  - 6/18 sputum cx: Stenotrophomonas maltophilia (ceftazidime and levofloxacin R)  - 6/19 BAL cx: Stenotrophomonas maltophilia, Enterococcus faecalis (gentamicin R), Aspergillus (speciation in process)  - 6/21 BAL cx: Stenotrophomonas maltophilia and Enterococcus faecalis VRE  - 6/24 sputum cx: Stenotrophomonas maltophilia, Enterococcus faecalis VRE, and Candida guilliermondi complex  - Transplant ID recommendations prior to LTACH transfer:               - Continue posaconazole 300 mg/day   - Continue VGCV prophylaxis  - Weekly CMV VL (Tuesdays, next 7/9), last was 39 (7/2)         - Azithromycin per pulmonary (holding due to QTC, but will follow up)  - TMP/SMX SS daily for PJP PPx (for life given h/o PJP)  - Awaiting susceptibilities on Aspergillus per transplant ID  - Present antibiotics               - Continue posaconazole 300 mg/day per pulm transplant (6/25 to present) for candida guillermondii and asergillus ochraceus on prior BAL  - BAL fluid (7/3): pink, hazy, cell counts normal range, fluid sent for viral, bacterial and fungal cultures negative to date, cytology negative for malignancy and organisms; preliminary AFB negative     Hx Aspergillus PNA (11/2023)  Hx PJP PNA (1/2021)  Hx CMV viremia, recurrent  Hx EBV viremia  - Transplant ID consult, appreciate recs  PTA posaconazole (Treatment for hx of aspergillus PNA)  PTA azithromycin 250mg qd (CLAD ppx) (holding)  PTA bactrim (PJP ppx)  -CMV PCR 7/16 positive but <35     ESRD on iHD (T,Th,Sat)  Hypokalemia  History of ESRD 2/2 Tacrolimus toxicity on HD (MWF). With soft blood pressures, placed RIJ (6/20/2024) and started CRRT (6/20/2024). US of AVF 7/5: arterial inflow patent without inflow stenosis, normal diameter of anastamosis, venous outflow elevated velocity at subclavian- suggestive of recurrent stenosis in venous outflow (area of prior angioplasty in March). IR consulted who noted that IR fistulogram not required at that time, and was able  to run iHD on beginning on 7/6 without issue.   - Nephrology consulted and following for ESRD, patient tolerating 3x weekly HD at time of discharge  - Renally-dosed medications  - BMP daily  - HD T/ Th/ Sa schedule now  -replete electrolytes as needed         Toxic metabolic encephalopathy, resolved  Patient noted to have lethargy in setting of sepsis and delirium in the setting of high-dose steroid therapy which improved with management of underlying sepsis. Patient mental status returned to baseline at day of discharge.   -ativan 0.5mg PO q6h  -ativan 1mg PO q4h\prn   -ramelteon 8 mg at bedtime  -trazadone 50mg at bedtime   - Delirium precautions  - Do not disturb order overnight to promote sleep     Septic shock, resolved  On 6/19/2024, developed sepsis in the setting of stenotrophomonas pneumonia/enterococcus faecium VRE UTI. Resolved.     Demand Ischemia, resolved    Presented with elevated troponin (peak 499). Not associated with chest pain or hypoxia. EKG without ST elevations/depressions or T wave inversions. Suspect demand ischemia in the setting of sepsis. Will continue to monitor symptoms and continuous telemetry. EKG 7/2 sinus rhythm with PACs.     Paroxysmal A-Fib, improved  Pulmonary Hypertension   Transient arhythmia with palpitations  History of pulmonary hypertension (45 mmHg, echo 10/2023) in the setting of ILD and paroxysmal afib on PTA metoprolol tartrate BID. Not on anticoagulation for afib. Transient unspecified arhythmia overnight (7/9) with palpitations. EKG 7/9 sinus rhythm with fusion complexes and known RBBB. Repeat EKG 7/10 showing sinus rhythm with no RBBB or fusion complexes. Patient given magnesium 1g and 20mEq K+ for K 3.3 this am. No further episodes of palpitations. Currently in sinus rhythm.  - metoprolol tartrate 25mg BID     Prolonged Qtc, resolved  Repeat EKG 7/13 with QTC of 483.   - Azithromycin ppx restarted per pulm transplant  -Qtc 476 (7/17)  -Daily EKG        Severe  protein calorie deficiency  PEG-J placed by IR on 7/5, continue tube feeds.   - Nutrition consulted  - Tube feeds 30ml/hr; at goal     Diarrhea, resolved  On 6/21, had 8 bowel movements. Occurred in the setting of scheduled bowel regimen and starting new antibiotics (meropenem, levofloxacin). C diff negative.   - Holding PRN bowel regimen for loose stools  - Miralax PRN   - Senna prn  - formula changes made per nutrition     Cholelithiasis with gallbladder wall thickening, resolved  During admission patient found to have up trending LFTs and fever in setting of GB wall thickening on CT abdomen/pelvis, now resolved. MRCP completed showing borderline dilated CBD up to 1.1 cm, no significant intrahepatic biliary dilation, no evidence of choledocholithiasis, no evidence of acute cholecystitis.    hyperglycemia with high dose steroids  Stress dose steroids discontinued, resume PTA prednisone 5 mg daily. Blood sugars have remained in appropriate range.  - lantus 10 unit(s) qam  -sliding scale insulin         Pyuria, Enterococcus faecium VRE and  ESBL E. Coli   Asymptomatic. With progressive IV pressor needs, obtained broad infectious workup which demonstrated UCx (6/19/2024) with Enterococcus faecium VR and ESBL E. Coli. S/p Daptomycin (6/21-6/23) and Meropenem (6/21-6/24).        Acute on Chronic Normocytic Anemia, stable  Thrombocytopenia, chronic, improving  History of chronic anemia in the setting of kidney disease (baseline Hgb 10-11). Upon admission, Hgb 9. May be bone marrow suppression in the setting of chronic illness; potential contribution by blood loss with CRRT filter changes (~150-200c). Peripheral smear (6/18/2024) without evidence of schistocytes.      FOLLOW UPS NEEDED  -pulmonology for dedicated bronchoscopy for stent visualisation (anticipated 8/15)      ANTIBIOTICS COURSES:              - s/p micafungin 150 mg/day (6/25-7/9)  - s/p IV minocycline 100mg BD x 14 days total (6/20-7/5)- for  stenotrophamonas  - PO linezolid 600 mg BID x 10 days (6/25-7/5)- for VRE in urine and BAL cultures  - S/p ceftazidime (6/25-6/28)               - S/p vancomycin (6/18-6/20)              - S/p zosyn (6/18-21)  - S/p Daptomycin (6/21-6/23)  - S/p Meropenem (6/21-6/24)  - S/p Levofloxacin (6/21-6/23)        Diet: NPO per Anesthesia Guidelines for Procedure/Surgery Except for: Meds, Ice Chips, NPO but receiving Tube Feeding  Adult Formula Drip Feeding: Continuous [employer] Renal Support; Jejunostomy; Goal Rate: 40; mL/hr; Once current bag runs out, start new formula at 30 mL/hr, increase to goal rate after 8 hours    DVT Prophylaxis: Heparin SQ  Basilio Catheter: Not present  Lines: None     Cardiac Monitoring: None  Code Status: No CPR- Pre-arrest intubation OK      Clinically Significant Risk Factors              # Hypoalbuminemia: Lowest albumin = 2.6 g/dL at 7/29/2024  6:35 AM, will monitor as appropriate                 #Precipitous drop in Hgb/Hct: Lowest Hgb this hospitalization: 6.8 g/dL. Will continue to monitor and treat/transfuse as appropriate.      # Severe Malnutrition: based on nutrition assessment    # Financial/Environmental Concerns: none         Disposition Plan     Medically Ready for Discharge: Anticipated in 5+ Days             Zeus Benedict MD  Hospitalist Service  LTACH  Securely message with [employer] (more info)  Text page via [facility] Paging/Directory   ______________________________________________________________________    Interval History   - no acute events ovn  - pt resting comfortably in bed  - updated family  - asking for nebs to prn  - pt noted to be comfortable with current regimen  - no other acute concerns    Physical Exam   Vital Signs: Temp: 98  F (36.7  C) Temp src: Oral BP: 134/65 Pulse: 74   Resp: 21 SpO2: 100 % O2 Device: Mechanical Ventilator Oxygen Delivery: 3 LPM  Weight: 115 lbs 15.39 oz    General:  No acute distress, cachectic , temporal muscle wasting  Eyes:  Sclera non  icteric, normal conjuctiva  Neck :  Supple, no lymphadenopathy, trach in place   Lungs: Rhonchi noted in all lung fields   CVS:  S1 and S2, regular rate and rhythem  Abdomen:  Soft, nontender, PEG in place   Musculoskeletal:  No pain or swelling.  No edema  Neuro:  Alert and oriented.  No acute deficit     Medical Decision Making       50 MINUTES SPENT BY ME on the date of service doing chart review, history, exam, documentation & further activities per the note.  MANAGEMENT DISCUSSED with the following over the past 24 hours:     NOTE(S)/MEDICAL RECORDS REVIEWED over the past 24 hours:    Tests ORDERED & REVIEWED in the past 24 hours:  - See lab/imaging results included in the data section of the note      Data   Imaging results reviewed over the past 24 hrs:   No results found for this or any previous visit (from the past 24 hour(s)).      Most Recent 3 CBC's:  Recent Labs   Lab Test 07/31/24  0703 07/29/24  0635 07/27/24  0633   WBC 3.0* 2.5* 3.8*   HGB 6.8* 7.4* 8.1*   * 108* 110*    187 202     Most Recent 3 BMP's:  Recent Labs   Lab Test 08/01/24  0557 08/01/24  0003 07/31/24  1839 07/29/24  0727 07/29/24  0635 07/25/24  2349 07/25/24 2001 07/23/24  0617 07/23/24  0554 07/22/24  0900 07/22/24  0631 07/20/24  1209 07/20/24  0650   NA  --   --   --   --  135  --  133*  --  138   < > 134*   < > 133*   POTASSIUM  --   --   --   --  3.6  --  4.7  --  3.3*   < > 3.8   < > 4.4   CHLORIDE  --   --   --   --  96*  --   --   --   --   --  96*  --  93*   CO2  --   --   --   --  27  --   --   --   --   --  28  --  27   BUN  --   --   --   --  86.4*  --   --   --   --   --  54.1*  --  61.6*   CR  --   --   --   --  2.61*  --   --   --   --   --  2.11*  --  2.49*   ANIONGAP  --   --   --   --  12  --   --   --   --   --  10  --  13   CHELSEY  --   --   --   --  7.4*  --   --   --   --   --  8.5*  --  8.6*   * 175* 211*   < > 177*   < > 204*   < > 156*   < > 199*   < > 200*    < > = values in this interval  not displayed.     Most Recent 2 LFT's:  Recent Labs   Lab Test 07/29/24  0635 07/22/24  0631   AST 29 25   ALT 37 38   ALKPHOS 382* 287*   BILITOTAL 0.4 0.5     Most Recent 3 INR's:  Recent Labs   Lab Test 06/18/24  1418 03/05/24  0925 02/14/24  1050   INR 0.98 0.98 0.96

## 2024-08-01 NOTE — PROGRESS NOTES
SPIRITUAL HEALTH SERVICES ([facility])  SPIRITUAL ASSESSMENT Progress Note  [facility]. Unit LTACH     REFERRAL SOURCE: Followup      Confirmed with Irene Frias that she is available Friday afternoon for a communion service for the family. Based on the information I received,  we scheduled it for 3-4 pm for the family.  I let the family know of her availability (Friday afternoon only). Will followup with all parties on Friday morning.      PLAN: Will follow during this hospitalization.      Symone Vazquez, Ph.D., [facility]      [facility] available 24/7 for emergency requests/referrals, either by having the on-call  paged or by entering an ASAP/STAT consult in Epic (this will also page the on-call ).

## 2024-08-01 NOTE — PLAN OF CARE
Problem: Adult Inpatient Plan of Care  Goal: Optimal Comfort and Wellbeing  Outcome: Progressing  Intervention: Monitor Pain and Promote Comfort  Recent Flowsheet Documentation  Taken 8/1/2024 0949 by Earline Cohen RN  Pain Management Interventions: medication (see MAR)  Intervention: Provide Person-Centered Care  Recent Flowsheet Documentation  Taken 8/1/2024 0930 by Earline Cohen RN  Trust Relationship/Rapport: care explained     Problem: Anxiety Signs/Symptoms  Goal: Optimized Energy Level (Anxiety Signs/Symptoms)  Outcome: Progressing     Problem: Pain Acute  Goal: Optimal Pain Control and Function  Outcome: Progressing  Intervention: Develop Pain Management Plan  Recent Flowsheet Documentation  Taken 8/1/2024 0949 by Earline Cohen RN  Pain Management Interventions: medication (see MAR)  Intervention: Prevent or Manage Pain  Recent Flowsheet Documentation  Taken 8/1/2024 0930 by Earline Cohen RN  Sensory Stimulation Regulation: care clustered  Sleep/Rest Enhancement: family presence promoted  Medication Review/Management: medications reviewed  Intervention: Optimize Psychosocial Wellbeing  Recent Flowsheet Documentation  Taken 8/1/2024 0930 by Earline Cohen RN  Supportive Measures: active listening utilized   Goal Outcome Evaluation:         Alert and able to communicate her needs.On vent. Requested PRN Oxycodone 5 mg for generalized pain rated at 1/10.Patient said it also helps with her anxiety.All due medications and cares are done.Tolerating tube feeding.No bowel movement this shift.Had one urine incontinence.  Family members are visiting.

## 2024-08-02 NOTE — PLAN OF CARE
Problem: Adult Inpatient Plan of Care  Goal: Absence of Hospital-Acquired Illness or Injury  Intervention: Identify and Manage Fall Risk  Recent Flowsheet Documentation  Taken 8/2/2024 0810 by Asiya Carcamo RN  Safety Promotion/Fall Prevention:   assistive device/personal items within reach   patient and family education   room near nurse's station  Intervention: Prevent Skin Injury  Recent Flowsheet Documentation  Taken 8/2/2024 0810 by Asiya Carcamo RN  Skin Protection: incontinence pads utilized  Device Skin Pressure Protection: absorbent pad utilized/changed  Intervention: Prevent Infection  Recent Flowsheet Documentation  Taken 8/2/2024 0810 by Asiya Carcamo RN  Infection Prevention:   hand hygiene promoted   personal protective equipment utilized  Goal: Optimal Comfort and Wellbeing  Intervention: Provide Person-Centered Care  Recent Flowsheet Documentation  Taken 8/2/2024 0810 by Asiya Carcamo RN  Trust Relationship/Rapport:   care explained   choices provided   emotional support provided   questions encouraged     Problem: Mechanical Ventilation Invasive  Goal: Effective Communication  Intervention: Ensure Effective Communication  Recent Flowsheet Documentation  Taken 8/2/2024 0810 by Asiya Carcamo RN  Communication Enhancement Strategies: call light answered in person  Family/Support System Care: presence promoted  Trust Relationship/Rapport:   care explained   choices provided   emotional support provided   questions encouraged  Goal: Optimal Device Function  Intervention: Optimize Device Care and Function  Recent Flowsheet Documentation  Taken 8/2/2024 0810 by Asiya Carcamo RN  Airway Safety Measures: all equipment/monitors on and audible  Airway/Ventilation Management: airway patency maintained  Goal: Mechanical Ventilation Liberation  Intervention: Promote Extubation and Mechanical Ventilation Liberation  Recent Flowsheet Documentation  Taken 8/2/2024 0810 by  Asiya Carcamo RN  Medication Review/Management: medications reviewed  Environmental Support: calm environment promoted  Goal: Absence of Device-Related Skin and Tissue Injury  Intervention: Maintain Skin and Tissue Health  Recent Flowsheet Documentation  Taken 8/2/2024 0810 by Asiya Carcamo RN  Device Skin Pressure Protection: absorbent pad utilized/changed     Problem: Anxiety Signs/Symptoms  Goal: Improved Mood Symptoms (Anxiety Signs/Symptoms)  Intervention: Optimize Emotion and Mood  Recent Flowsheet Documentation  Taken 8/2/2024 0810 by Asiya Carcamo RN  Supportive Measures: active listening utilized  Goal: Enhanced Social, Occupational or Functional Skills (Anxiety Signs/Symptoms)  Intervention: Promote Social, Occupational and Functional Ability  Recent Flowsheet Documentation  Taken 8/2/2024 0810 by Asiya Carcamo RN  Social Functional Ability Promotion: self-expression encouraged  Trust Relationship/Rapport:   care explained   choices provided   emotional support provided   questions encouraged     Problem: Pain Acute  Goal: Optimal Pain Control and Function  Intervention: Prevent or Manage Pain  Recent Flowsheet Documentation  Taken 8/2/2024 0810 by Asiya Carcamo RN  Sensory Stimulation Regulation: care clustered  Medication Review/Management: medications reviewed  Intervention: Optimize Psychosocial Wellbeing  Recent Flowsheet Documentation  Taken 8/2/2024 0810 by Asiya Carcamo RN  Supportive Measures: active listening utilized     Problem: Enteral Nutrition  Goal: Absence of Aspiration Signs and Symptoms  Intervention: Minimize Aspiration Risk  Recent Flowsheet Documentation  Taken 8/2/2024 0810 by Asiya Carcamo RN  Enteral Feeding Safety: tubing labeled as enteral feeding  Goal: Safe, Effective Therapy Delivery  Intervention: Prevent Feeding-Related Adverse Events  Recent Flowsheet Documentation  Taken 8/2/2024 0810 by Asiya Carcamo RN  Enteral Feeding  Safety: tubing labeled as enteral feeding     Problem: Wound  Goal: Optimal Coping  Intervention: Support Patient and Family Response  Recent Flowsheet Documentation  Taken 8/2/2024 0810 by Asiya Carcamo RN  Supportive Measures: active listening utilized  Family/Support System Care: presence promoted  Goal: Optimal Functional Ability  Intervention: Optimize Functional Ability  Recent Flowsheet Documentation  Taken 8/2/2024 0810 by Asiya Carcamo RN  Assistive Device Utilized: lift device  Activity Management: activity adjusted per tolerance  Activity Assistance Provided: assistance, 2 people  Goal: Absence of Infection Signs and Symptoms  Intervention: Prevent or Manage Infection  Recent Flowsheet Documentation  Taken 8/2/2024 0810 by Asiya Carcamo RN  Isolation Precautions: contact precautions maintained  Goal: Skin Health and Integrity  Intervention: Optimize Skin Protection  Recent Flowsheet Documentation  Taken 8/2/2024 0810 by Asiya Carcamo RN  Pressure Reduction Techniques: heels elevated off bed  Pressure Reduction Devices: alternating pressure pump (MARIA A)  Skin Protection: incontinence pads utilized  Activity Management: activity adjusted per tolerance     Problem: Hemodialysis  Goal: Safe, Effective Therapy Delivery  Intervention: Optimize Device Care and Function  Recent Flowsheet Documentation  Taken 8/2/2024 0810 by Asiya Carcamo RN  Medication Review/Management: medications reviewed  Goal: Absence of Infection Signs and Symptoms  Intervention: Prevent or Manage Infection  Recent Flowsheet Documentation  Taken 8/2/2024 0810 by Asiya Carcamo RN  Infection Prevention:   hand hygiene promoted   personal protective equipment utilized   Goal Outcome Evaluation:

## 2024-08-02 NOTE — PROGRESS NOTES
Pulmonary update:    Chart reviewed.  Discussed with RT and hospitalist.  Planning to transition to comfort measures after patient visits with  later this afternoon.    - ok to discontinue Tacro but recommend keeping on 10mg prednisone daily for pain control   - When vent withdrawal occurs, ok to place on trach dome with or without speaking valve or trach cap - per patient preference.   - Keep trach in place to suction secretions if necessary   - O2 is ok for comfort but would not go above 4LPM  - Aggressively manage symptoms (dyspnea, anxiety, pain, delirium,secretions)  - Goal of keep respiratory rate <30, and eliminate signs of distress (grimacing, fear expression, agitation, heavy secretions, use of accessory muscles)  - defer med changes/comfort meds to primary team  - please call anytime with questions    Eyad Harding CNP  Pulmonary Medicine  [facility]  Pager [phone]  Office: [phone]

## 2024-08-02 NOTE — PLAN OF CARE
Speech Language Therapy Discharge Summary    Reason for therapy discharge:    Patient has transitioned to comfort measures only.    Progress towards therapy goal(s). See goals on Care Plan in Epic electronic health record for goal details.  Goals not met.  Barriers to achieving goals:   limited tolerance for therapy.    Therapy recommendation(s):    No further therapy is recommended.

## 2024-08-02 NOTE — PLAN OF CARE
Problem: Adult Inpatient Plan of Care  Goal: Optimal Comfort and Wellbeing  Intervention: Provide Person-Centered Care  Recent Flowsheet Documentation  Taken 8/1/2024 2339 by Odalis Arnold RN  Trust Relationship/Rapport:   care explained   emotional support provided     Problem: Adult Inpatient Plan of Care  Goal: Absence of Hospital-Acquired Illness or Injury  Intervention: Identify and Manage Fall Risk  Recent Flowsheet Documentation  Taken 8/1/2024 2339 by Odalis Arnold, LOUISE  Safety Promotion/Fall Prevention:   assistive device/personal items within reach   patient and family education   room near nurse's station     Problem: Mechanical Ventilation Invasive  Goal: Mechanical Ventilation Liberation  Intervention: Promote Extubation and Mechanical Ventilation Liberation  Recent Flowsheet Documentation  Taken 8/1/2024 2339 by Odalis Arnold RN  Sleep/Rest Enhancement:   awakenings minimized   comfort measures   family presence promoted   noise level reduced   room darkened  Medication Review/Management: medications reviewed  Environmental Support:   calm environment promoted   distractions minimized   environmental consistency promoted  Blood pressure (!) 141/68, pulse 77, temperature 97.7  F (36.5  C), temperature source Axillary, resp. rate 21, weight 52.6 kg (115 lb 15.4 oz), last menstrual period 06/07/2014, SpO2 99%, not currently breastfeeding. Asymptomatic HTN. On vent during the night. Patient is A&Ox4, calm, cooperative and denies pain. BG q6h. NPO. TF running without issue. Hgb 6.8 on 07/31/24. Patient refuses blood transfusion. Patient's daughter stays in the room during the night. Patient slept well (5-6 hours) through the night.

## 2024-08-02 NOTE — PLAN OF CARE
"  Problem: Adult Inpatient Plan of Care  Goal: Plan of Care Review  Description: The Plan of Care Review/Shift note should be completed every shift.  The Outcome Evaluation is a brief statement about your assessment that the patient is improving, declining, or no change.  This information will be displayed automatically on your shift  note.  Outcome: Progressing  Goal: Patient-Specific Goal (Individualized)  Description: You can add care plan individualizations to a care plan. Examples of Individualization might be:  \"Parent requests to be called daily at 9am for status\", \"I have a hard time hearing out of my right ear\", or \"Do not touch me to wake me up as it startles  me\".  Outcome: Progressing  Goal: Absence of Hospital-Acquired Illness or Injury  Outcome: Progressing  Intervention: Identify and Manage Fall Risk  Recent Flowsheet Documentation  Taken 8/2/2024 0810 by Asiya Carcamo RN  Safety Promotion/Fall Prevention:   assistive device/personal items within reach   patient and family education   room near nurse's station  Intervention: Prevent Skin Injury  Recent Flowsheet Documentation  Taken 8/2/2024 0810 by Asiya Carcamo RN  Skin Protection: incontinence pads utilized  Device Skin Pressure Protection: absorbent pad utilized/changed  Intervention: Prevent Infection  Recent Flowsheet Documentation  Taken 8/2/2024 0810 by Asiya Carcamo RN  Infection Prevention:   hand hygiene promoted   personal protective equipment utilized  Goal: Optimal Comfort and Wellbeing  Outcome: Progressing  Intervention: Provide Person-Centered Care  Recent Flowsheet Documentation  Taken 8/2/2024 0810 by Asiya Carcamo RN  Trust Relationship/Rapport:   care explained   choices provided   emotional support provided   questions encouraged  Goal: Readiness for Transition of Care  Outcome: Progressing     Problem: Anxiety Signs/Symptoms  Goal: Optimized Energy Level (Anxiety Signs/Symptoms)  Outcome: Progressing   " Goal Outcome Evaluation:         Patient presented as calm, pleasant and cooperative. Temp: 98.4  F (36.9  C) Temp src: Axillary BP: 118/59 Pulse: 86   Resp: 21 SpO2: 100 % O2 Device: Mechanical Ventilator     . Patient complained of anxiety and received Ativan PRN and scheduled, patient also requested for Oxycodone and Dilaudid. Patient is on comfort cares, family is at bedside.    Asiya Carcamo RN

## 2024-08-02 NOTE — PROGRESS NOTES
NUTRITION BRIEF NOTE    RD following for enteral nutrition management. Upon chart review, patient electing to transition to comfort cares later today. MD will discontinue nutrition support orders when appropriate. RD to sign off. Please page/consult as needed.    Isabel Robertson RDN, LD  Clinical Dietitian

## 2024-08-02 NOTE — PLAN OF CARE
7 PM to 7 AM RT PROGRESS NOTE     DATA:     CURRENT SETTINGS:             TRACH TYPE / SIZE:  6 Shiley placed 7/8/24             MODE:   AC 18, 360, +5, 25%      ACTION:          SUCTION:  Cuff briefly dropped for cough and clear                         FREQUENCY:   X 2                        AMOUNT:   small x 1, mod x 1                        CONSISTENCY:   thin/thick                        COLOR:   white             SPONTANEOUS COUGH EFFORT/STRENGTH OF EFFORT (not elicited by suctioning): Good                   RESPONSE:             BS:   CS             VITAL SIGNS:   SATs 100%, HR 82, RR 23             RISK FOR SELF DECANNULATION:  No    NOTE / PLAN:   Anticipate transitioning to Comfort Cares sometime Friday. Pt can cap PRN. Continue with same      Problem: Mechanical Ventilation Invasive  Goal: Optimal Device Function  Outcome: Progressing  Goal: Absence of Device-Related Skin and Tissue Injury  Outcome: Progressing  Goal: Absence of Ventilator-Induced Lung Injury  Outcome: Progressing     Problem: Mechanical Ventilation Invasive  Goal: Mechanical Ventilation Liberation  Outcome: Unable to Meet

## 2024-08-02 NOTE — PROGRESS NOTES
SPIRITUAL HEALTH SERVICES (SHS)  SPIRITUAL ASSESSMENT Progress Note  [facility]. Unit LTACH     REFERRAL SOURCE: Progress note     I contacted Irene Frias and gave her Nasreen (dtr) contact information. Irene and Nasreen will work together to facilitate what is best for the family at this time.      PLAN: [facility]S will follow.      Symone Vazquez, Ph.D., [facility]      SHS available 24/7 for emergency requests/referrals, either by having the on-call  paged or by entering an ASAP/STAT consult in Epic (this will also page the on-call ).

## 2024-08-02 NOTE — PROGRESS NOTES
1900 -2230: RN took over patient's care at 1900, scheduled medications given, pt denied pain, pt made comfortable. Staff will monitor.

## 2024-08-02 NOTE — PROGRESS NOTES
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 Hospital course complicated by hypotension, delirium, and prolonged respiratory failure.  She has a tracheostomy placement on 7/3 and PEG-J tube placement with IR on 7/5.      Patient arrived on LTACH unit on 7/18 in severe respiratory distress. She complained of nausea, chest pressure, and dsypnea. She is tachypneic and breathing at 41 breaths per minute. She is tripoding and pulse ox reading in low 80's, FiO2 on vent was increased to 50%. She has severe rhonchi and rales in all lung fields, worst in RUL. She was suctioned twice and minimal secretions noted and was given xopenex. Her rhochi and rales of L lung field improved, but not the RUL. She is having severe anxiety and was given 0.5 mg of ativan IVP. She is complaining of chest pressure and tachycardic to 133. EKG done and revealed an incomplete right bundle branch block, no ST-T wave changes in contiguous leads. BP is 215/91- given 10mg of IV hydralazine, BP decreased to 185/99. Spoke with Dr. Sanches and he accepted pt back to [facility].  On 7/19, pt has improved and vitals are now stable and has been transferred back to [place].    LTACH course:  7/20-7/22: +anxiety, has seroquel as first line and ativan as second line prn.  Not sleeping well and not tolerating weaning well.  Added lantus 5 unit(s) for hyperglycemia.  Increased seroquel to 25 q8h  7/23-7/28:  Met with palliative medicine per  limited ventilator weaning. Code status updated to DNR/I OK; requesting improved anxiety control and aware this may continue to limit ventilator weaning attempts. Pancytopenia withOUT absolute neutropenia. Kecia indicating that she's tired, further explained that she is now ready for hospice. Family aware, would like to have dedicated discussion of hospice vs. Comfort care, as they would like to consider getting her to home; meeting with palliative medicine again on 7/30.    7/29: No medical changes today. HD. Palliative tomorrow?  7/30: Palliative to meet with family today, will plan for comfort care later this week. Increased lorazepam to 1mg q6h scheduled and oxycodone/dilaudid for pain as needed. Further med changes as care progresses.   7/31: Last day of HD today. Hgb 6.8, no pRBC per pt/family. No further lab draws.   8/1: Stable and comfortable today. Spiritual Care informs that a  will be coming tomorrow to provide communion.   8/2: To receive communion today. Comfort Care, will withdraw from vent later this afternoon after pt receives communion at the direction of the pt/family.     Assessment & Plan      Goals of Care  - comfort care orders entered  - no further lab draws  - no blood transfusions   - nebs to prn  - Spiritual Care made aware  - will work with pulmonary to medicate and wean pt from vent when appropriate, plan for trach cap        Diet: NPO per Anesthesia Guidelines for Procedure/Surgery Except for: Meds, Ice Chips, NPO but receiving Tube Feeding  Adult Formula Drip Feeding: Continuous Foodem Renal Support; Jejunostomy; Goal Rate: 40; mL/hr; Once current bag runs out, start new formula at 30 mL/hr, increase to goal rate after 8 hours    DVT Prophylaxis: Heparin SQ  Basilio Catheter: Not present  Lines: None     Cardiac Monitoring: None  Code Status: No CPR- Pre-arrest intubation OK      Clinically Significant Risk Factors              # Hypoalbuminemia: Lowest albumin = 2.6 g/dL at  7/29/2024  6:35 AM, will monitor as appropriate                 #Precipitous drop in Hgb/Hct: Lowest Hgb this hospitalization: 6.8 g/dL. Will continue to monitor and treat/transfuse as appropriate.      # Severe Malnutrition: based on nutrition assessment    # Financial/Environmental Concerns: none         Disposition Plan     Medically Ready for Discharge: Anticipated in 5+ Days             Zeus Benedict MD  Hospitalist Service  LTACH  Securely message with [employer] (more info)  Text page via [employer] Paging/Directory   ______________________________________________________________________    Interval History   - no acute events ovn  - pt comfortably and doing well today  - plan for comfort care starting now  - meds removed  - plan for vent removal later this afternoon after  comes in for communion   - no other acute concerns    Physical Exam   Vital Signs: Temp: 97.7  F (36.5  C) Temp src: Axillary BP: (!) 141/68 Pulse: 74   Resp: 22 SpO2: 100 % O2 Device: Mechanical Ventilator    Weight: 117 lbs 11.61 oz    General:  No acute distress, cachectic , temporal muscle wasting  Eyes:  Sclera non icteric, normal conjuctiva  Neck :  Supple, no lymphadenopathy, trach in place   Lungs: Rhonchi noted in all lung fields   CVS:  S1 and S2, regular rate and rhythem  Abdomen:  Soft, nontender, PEG in place   Musculoskeletal:  No pain or swelling.  No edema  Neuro:  Alert and oriented.  No acute deficit     Medical Decision Making       50 MINUTES SPENT BY ME on the date of service doing chart review, history, exam, documentation & further activities per the note.  MANAGEMENT DISCUSSED with the following over the past 24 hours:     NOTE(S)/MEDICAL RECORDS REVIEWED over the past 24 hours:    Tests ORDERED & REVIEWED in the past 24 hours:  - See lab/imaging results included in the data section of the note      Data   Imaging results reviewed over the past 24 hrs:   No results found for this or any previous visit (from the past  24 hour(s)).      Most Recent 3 CBC's:  Recent Labs   Lab Test 07/31/24  0703 07/29/24  0635 07/27/24  0633   WBC 3.0* 2.5* 3.8*   HGB 6.8* 7.4* 8.1*   * 108* 110*    187 202     Most Recent 3 BMP's:  Recent Labs   Lab Test 08/02/24  0546 08/01/24  2335 08/01/24  1752 07/29/24 0727 07/29/24 0635 07/25/24  2349 07/25/24 2001 07/23/24  0617 07/23/24  0554 07/22/24  0900 07/22/24  0631 07/20/24  1209 07/20/24  0650   NA  --   --   --   --  135  --  133*  --  138   < > 134*   < > 133*   POTASSIUM  --   --   --   --  3.6  --  4.7  --  3.3*   < > 3.8   < > 4.4   CHLORIDE  --   --   --   --  96*  --   --   --   --   --  96*  --  93*   CO2  --   --   --   --  27  --   --   --   --   --  28  --  27   BUN  --   --   --   --  86.4*  --   --   --   --   --  54.1*  --  61.6*   CR  --   --   --   --  2.61*  --   --   --   --   --  2.11*  --  2.49*   ANIONGAP  --   --   --   --  12  --   --   --   --   --  10  --  13   CHELSEY  --   --   --   --  7.4*  --   --   --   --   --  8.5*  --  8.6*   * 204* 258*   < > 177*   < > 204*   < > 156*   < > 199*   < > 200*    < > = values in this interval not displayed.     Most Recent 2 LFT's:  Recent Labs   Lab Test 07/29/24 0635 07/22/24 0631   AST 29 25   ALT 37 38   ALKPHOS 382* 287*   BILITOTAL 0.4 0.5     Most Recent 3 INR's:  Recent Labs   Lab Test 06/18/24  1418 03/05/24  0925 02/14/24  1050   INR 0.98 0.98 0.96

## 2024-08-03 NOTE — PLAN OF CARE
Problem: Mechanical Ventilation Invasive  Goal: Optimal Device Function  Outcome: Progressing  Intervention: Optimize Device Care and Function  Recent Flowsheet Documentation  Taken 7/27/2024 0030 by Jeremiah Antonio, RT  Airway Safety Measures: all equipment/monitors on and audible  Taken 7/26/2024 2004 by Jeremiah Antonio, RT  Airway Safety Measures: all equipment/monitors on and audible  Goal: Mechanical Ventilation Liberation  Outcome: Progressing  Goal: Absence of Device-Related Skin and Tissue Injury  Outcome: Progressing   RT PROGRESS NOTE     DATA:     CURRENT SETTINGS:ac/18/360/+5, sb             [facility]  [facility]             MODE:   CAP/3L             FIO2:        ACTION:             THERAPIES:                SUCTION:                           FREQUENCY:   x2                        AMOUNT:   small                        CONSISTENCY:   thick                        COLOR:   white             SPONTANEOUS COUGH EFFORT/STRENGTH OF EFFORT (not elicited by suctioning):                               WEANING PHASE:   3                        WEAN MODE:    cap/3L                        WEAN TIME:                           END WEAN REASON:        RESPONSE:             BS:   crs             VITAL SIGNS:   RR 16             EMOTIONAL NEEDS / CONCERNS:                  RISK FOR SELF DECANNULATION:                         RISK DUE TO:                          INTERVENTION/S IN PLACE IS/ARE:         NOTE / PLAN:   Goal Outcome Evaluation:  CONT ON COMFORT CARE

## 2024-08-03 NOTE — PLAN OF CARE
"  Problem: Anxiety Signs/Symptoms  Goal: Optimized Energy Level (Anxiety Signs/Symptoms)  Outcome: Progressing     Problem: Pain Acute  Goal: Optimal Pain Control and Function  Outcome: Progressing   Goal Outcome Evaluation:    Patient was comfortable-looking at the start of shift, family stated (daughter and spouse) that they will inform nursing staff if pain medication is needed-was given IVP dilaudid at 0906 and 1216 so far this shift (1306)-was given another dose at 1410 (family members stated they want her to get this every 2 hours for comfort. Patient was able to open her eyes intermittently (when asked to say thank you to this writer by the spouse, unable to say this, but opened her eyes and smiled.Patient was given schedule prednisone in am too (family members were surprised that this has not been discontinued), provider (Jak) was paged, said \"Give it, it is okay for her\", was also given schedule ativan at 1216 too, repositioned as allowed, last documented bowel movement was on 8/2 (Friday), hair washed by family                        "

## 2024-08-03 NOTE — PLAN OF CARE
Goal Outcome Evaluation: Comfort Cares               Patient is alert and oriented x 4, on comfort cares, ,and daughter and family  are bedside, and pleasant and in relatively good spirits. Intermittent bouts of dyspnea, (Still has nasal canula) but patient has been mostly comfortable. Repositioned q2h, no incontinence  The patient is able to communicate their needs through the family,she remained calm and cooperative throughout the shift.   The following PRNs were given, to good effect:  Dilaudid 1 mg IVx 3; Ativan x 1 (+ 2 x Ativan scheduled q6). Continue with comfort cares, Pankaj Bustamante RN on 8/3/2024 at 7:50 AM

## 2024-08-03 NOTE — PROGRESS NOTES
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 Hospital course complicated by hypotension, delirium, and prolonged respiratory failure.  She has a tracheostomy placement on 7/3 and PEG-J tube placement with IR on 7/5.      Patient arrived on LTACH unit on 7/18 in severe respiratory distress. She complained of nausea, chest pressure, and dsypnea. She is tachypneic and breathing at 41 breaths per minute. She is tripoding and pulse ox reading in low 80's, FiO2 on vent was increased to 50%. She has severe rhonchi and rales in all lung fields, worst in RUL. She was suctioned twice and minimal secretions noted and was given xopenex. Her rhochi and rales of L lung field improved, but not the RUL. She is having severe anxiety and was given 0.5 mg of ativan IVP. She is complaining of chest pressure and tachycardic to 133. EKG done and revealed an incomplete right bundle branch block, no ST-T wave changes in contiguous leads. BP is 215/91- given 10mg of IV hydralazine, BP decreased to 185/99. Spoke with Dr. Sanches and he accepted pt back to [facility].  On 7/19, pt has improved and vitals are now stable and has been transferred back to [place].    LTACH course:  7/20-7/22: +anxiety, has seroquel as first line and ativan as second line prn.  Not sleeping well and not tolerating weaning well.  Added lantus 5 unit(s) for hyperglycemia.  Increased seroquel to 25 q8h  7/23-7/28:  Met with palliative medicine per  limited ventilator weaning. Code status updated to DNR/I OK; requesting improved anxiety control and aware this may continue to limit ventilator weaning attempts. Pancytopenia withOUT absolute neutropenia. Kecia indicating that she's tired, further explained that she is now ready for hospice. Family aware, would like to have dedicated discussion of hospice vs. Comfort care, as they would like to consider getting her to home; meeting with palliative medicine again on 7/30.    7/29: No medical changes today. HD. Palliative tomorrow?  7/30: Palliative to meet with family today, will plan for comfort care later this week. Increased lorazepam to 1mg q6h scheduled and oxycodone/dilaudid for pain as needed. Further med changes as care progresses.   7/31: Last day of HD today. Hgb 6.8, no pRBC per pt/family. No further lab draws.   8/1: Stable and comfortable today. Spiritual Care informs that a  will be coming tomorrow to provide communion.   8/2: To receive communion today. Comfort Care, will withdraw from vent later this afternoon after pt receives communion at the direction of the pt/family.   8/3: Comfort Care.     Assessment & Plan      Goals of Care  - comfort care orders entered  - Spiritual Care made aware  - trach capped        Diet:      DVT Prophylaxis: Heparin SQ  Basilio Catheter: Not present  Lines: None     Cardiac Monitoring: None  Code Status: No CPR- Do NOT Intubate      Clinically Significant Risk Factors              # Hypoalbuminemia: Lowest albumin = 2.6 g/dL at 7/29/2024  6:35 AM, will monitor as appropriate                 #Precipitous drop in Hgb/Hct: Lowest Hgb this hospitalization: 6.8 g/dL. Will continue to monitor and treat/transfuse as appropriate.      # Severe Malnutrition: based on nutrition assessment    # Financial/Environmental Concerns: none         Disposition Plan     Medically Ready for Discharge: Anticipated in 5+ Days             Zeus Benedict MD  Hospitalist  Service  LTACH  Securely message with [employer] (more info)  Text page via [facility] Paging/Directory   ______________________________________________________________________    Interval History   - no acute events ovn  - pt comfortable ovn and this morning  - family asking questions about why she looks better than she did yesterday   - we discussed that the medications for comfort are helping to mask symptoms she would otherwise be having  - discussed that her kidneys and lungs are still not functioning well and that nothing has changed in terms of her overall health (she has not gotten better despite appearing to have done so)  - discussed that this is the goal of comfort measures  - family understands and acknowledges   - no other acute concerns    Physical Exam   Vital Signs: Temp: 98.4  F (36.9  C) Temp src: Axillary BP: 118/59 Pulse: 86   Resp: 16 SpO2: 100 % O2 Device: Nasal cannula Oxygen Delivery: 3 LPM  Weight: 117 lbs 11.61 oz    General:  No acute distress, cachectic , temporal muscle wasting  Eyes:  Sclera non icteric, normal conjuctiva  Neck :  Supple, no lymphadenopathy, trach in place   Lungs: Rhonchi noted in all lung fields   CVS:  S1 and S2, regular rate and rhythem  Abdomen:  Soft, nontender, PEG in place   Musculoskeletal:  No pain or swelling.  No edema  Neuro:  Alert and oriented.  No acute deficit     Medical Decision Making       45 MINUTES SPENT BY ME on the date of service doing chart review, history, exam, documentation & further activities per the note.  MANAGEMENT DISCUSSED with the following over the past 24 hours:     NOTE(S)/MEDICAL RECORDS REVIEWED over the past 24 hours:    Tests ORDERED & REVIEWED in the past 24 hours:  - See lab/imaging results included in the data section of the note      Data   Imaging results reviewed over the past 24 hrs:   No results found for this or any previous visit (from the past 24 hour(s)).      Most Recent 3 CBC's:  Recent Labs   Lab Test 07/31/24  0703  07/29/24  0635 07/27/24  0633   WBC 3.0* 2.5* 3.8*   HGB 6.8* 7.4* 8.1*   * 108* 110*    187 202     Most Recent 3 BMP's:  Recent Labs   Lab Test 08/02/24  1143 08/02/24  0546 08/01/24  2335 07/29/24 0727 07/29/24 0635 07/25/24  2349 07/25/24 2001 07/23/24  0617 07/23/24  0554 07/22/24  0900 07/22/24  0631 07/20/24  1209 07/20/24  0650   NA  --   --   --   --  135  --  133*  --  138   < > 134*   < > 133*   POTASSIUM  --   --   --   --  3.6  --  4.7  --  3.3*   < > 3.8   < > 4.4   CHLORIDE  --   --   --   --  96*  --   --   --   --   --  96*  --  93*   CO2  --   --   --   --  27  --   --   --   --   --  28  --  27   BUN  --   --   --   --  86.4*  --   --   --   --   --  54.1*  --  61.6*   CR  --   --   --   --  2.61*  --   --   --   --   --  2.11*  --  2.49*   ANIONGAP  --   --   --   --  12  --   --   --   --   --  10  --  13   CHELSEY  --   --   --   --  7.4*  --   --   --   --   --  8.5*  --  8.6*   * 161* 204*   < > 177*   < > 204*   < > 156*   < > 199*   < > 200*    < > = values in this interval not displayed.     Most Recent 2 LFT's:  Recent Labs   Lab Test 07/29/24 0635 07/22/24 0631   AST 29 25   ALT 37 38   ALKPHOS 382* 287*   BILITOTAL 0.4 0.5     Most Recent 3 INR's:  Recent Labs   Lab Test 06/18/24  1418 03/05/24  0925 02/14/24  1050   INR 0.98 0.98 0.96

## 2024-08-03 NOTE — PLAN OF CARE
Goal Outcome Evaluation:        Pt look comfortable most of the time,  At the beginning of the shift, vent removed and capped. Charge nurse gave oxycodone and Haldol for agitation and shortness of breath around 1615.  PRN IV dilaudid and Ativan given for anxiety and shortness of breath with relief. Pt able to open her eyes for voice.  and daughters are here by her bed side. Turn and reposition as scheduled and PRN. Had BM x 1. Will continue monitoring.                 Problem: Adult Inpatient Plan of Care  Goal: Absence of Hospital-Acquired Illness or Injury  Intervention: Identify and Manage Fall Risk  Recent Flowsheet Documentation  Taken 8/2/2024 1900 by Khris Love RN  Safety Promotion/Fall Prevention:   assistive device/personal items within reach   patient and family education   room near nurse's station  Intervention: Prevent Infection  Recent Flowsheet Documentation  Taken 8/2/2024 1900 by Khris Love RN  Infection Prevention:   hand hygiene promoted   personal protective equipment utilized  Goal: Optimal Comfort and Wellbeing  Intervention: Monitor Pain and Promote Comfort  Recent Flowsheet Documentation  Taken 8/2/2024 2106 by Khris Love RN  Pain Management Interventions: medication (see MAR)  Intervention: Provide Person-Centered Care  Recent Flowsheet Documentation  Taken 8/2/2024 1900 by Khris Love RN  Trust Relationship/Rapport:   care explained   choices provided   emotional support provided   questions encouraged     Problem: Mechanical Ventilation Invasive  Goal: Effective Communication  Intervention: Ensure Effective Communication  Recent Flowsheet Documentation  Taken 8/2/2024 1900 by Khris Love RN  Family/Support System Care: support provided  Trust Relationship/Rapport:   care explained   choices provided   emotional support provided   questions encouraged  Goal: Optimal Device Function  Intervention: Optimize Device Care and Function  Recent Flowsheet  Documentation  Taken 8/2/2024 1900 by Khris Love RN  Airway Safety Measures: all equipment/monitors on and audible  Goal: Mechanical Ventilation Liberation  Intervention: Promote Extubation and Mechanical Ventilation Liberation  Recent Flowsheet Documentation  Taken 8/2/2024 1900 by Khris Love RN  Medication Review/Management: medications reviewed  Environmental Support: calm environment promoted     Problem: Anxiety Signs/Symptoms  Goal: Improved Mood Symptoms (Anxiety Signs/Symptoms)  Intervention: Optimize Emotion and Mood  Recent Flowsheet Documentation  Taken 8/2/2024 1900 by Khris Love RN  Supportive Measures: active listening utilized  Goal: Enhanced Social, Occupational or Functional Skills (Anxiety Signs/Symptoms)  Intervention: Promote Social, Occupational and Functional Ability  Recent Flowsheet Documentation  Taken 8/2/2024 1900 by Khris Love RN  Social Functional Ability Promotion: self-expression encouraged  Trust Relationship/Rapport:   care explained   choices provided   emotional support provided   questions encouraged     Problem: Pain Acute  Goal: Optimal Pain Control and Function  Intervention: Develop Pain Management Plan  Recent Flowsheet Documentation  Taken 8/2/2024 2106 by Khris Love RN  Pain Management Interventions: medication (see MAR)  Intervention: Prevent or Manage Pain  Recent Flowsheet Documentation  Taken 8/2/2024 1900 by Khris Love RN  Medication Review/Management: medications reviewed  Intervention: Optimize Psychosocial Wellbeing  Recent Flowsheet Documentation  Taken 8/2/2024 1900 by Khris Love RN  Supportive Measures: active listening utilized     Problem: Wound  Goal: Optimal Coping  Intervention: Support Patient and Family Response  Recent Flowsheet Documentation  Taken 8/2/2024 1900 by Khris Love RN  Supportive Measures: active listening utilized  Family/Support System Care: support provided  Goal: Absence of Infection  Signs and Symptoms  Intervention: Prevent or Manage Infection  Recent Flowsheet Documentation  Taken 8/2/2024 1900 by Khris Love RN  Infection Management: aseptic technique maintained  Isolation Precautions: contact precautions maintained  Goal: Optimal Pain Control and Function  Intervention: Prevent or Manage Pain  Recent Flowsheet Documentation  Taken 8/2/2024 2106 by Khris Love RN  Pain Management Interventions: medication (see MAR)     Problem: Hemodialysis  Goal: Safe, Effective Therapy Delivery  Intervention: Optimize Device Care and Function  Recent Flowsheet Documentation  Taken 8/2/2024 1900 by Khris Love RN  Medication Review/Management: medications reviewed  Goal: Effective Tissue Perfusion  Intervention: Optimize Blood Flow  Recent Flowsheet Documentation  Taken 8/2/2024 1900 by Khris Love RN  Stabilization Measures: legs elevated  Goal: Absence of Infection Signs and Symptoms  Intervention: Prevent or Manage Infection  Recent Flowsheet Documentation  Taken 8/2/2024 1900 by Khris Love RN  Infection Prevention:   hand hygiene promoted   personal protective equipment utilized  Infection Management: aseptic technique maintained

## 2024-08-03 NOTE — PLAN OF CARE
Goal: Absence of Ventilator-Induced Lung Injury       RT NOTE:     DATA: Comfort Care     CURRENT SETTINGS: CAP/NC             TRACH TYPE / SIZE:#6 SHILEY TaperGuard Placed on 7/8/24             MODE: CAP             FIO2: 3 LNC     ACTION:             THERAPIES:              SUCTION:  no                         FREQUENCY: none                        AMOUNT:                        CONSISTENCY:                         COLOR:              SPONTANEOUS COUGH EFFORT/STRENGTH OF EFFORT (not elicited by suctioning):                               WEANING PHASE: 3                        WEAN MODE:                         WEAN TIME:                         END WEAN REASON:      RESPONSE:             BS:             VITAL SIGNS:             EMOTIONAL NEEDS / CONCERNS:              RISK FOR SELF DECANNULATION: no                        RISK DUE TO: no                        INTERVENTION/S IN PLACE IS/ARE:         NOTE / PLAN: Comfort Care.

## 2024-08-04 NOTE — DISCHARGE SUMMARY
Discharge Summary    Brief History:  Kecia Blue is a 63 year old female with PMH ILD s/p bilateral lung transplant () c/b recurrent right bronchial stenosis s/p repeat balloon dilation/stenting, repeat opportunistic pneumonia (PJP , aspergillus/stenotrophomonas 2023) and viremias (EBV, CMV), and ESRD 2/2 tacrolimus toxicity on HD who was admitted on 2024 for acute hypercapnic respiratory failure 2/2 tracheal stenosis and pneumonia status post airway stent placement by IP on . Hospital course complicated by hypotension, delirium, and prolonged respiratory failure.  She has a tracheostomy placement on 7/3 and PEG-J tube placement with IR on .      Patient arrived on LTACH unit on  in severe respiratory distress. She complained of nausea, chest pressure, and dsypnea. She is tachypneic and breathing at 41 breaths per minute. She is tripoding and pulse ox reading in low 80's, FiO2 on vent was increased to 50%. She has severe rhonchi and rales in all lung fields, worst in RUL. She was suctioned twice and minimal secretions noted and was given xopenex. Her rhochi and rales of L lung field improved, but not the RUL. She is having severe anxiety and was given 0.5 mg of ativan IVP. She is complaining of chest pressure and tachycardic to 133. EKG done and revealed an incomplete right bundle branch block, no ST-T wave changes in contiguous leads. BP is 215/91- given 10mg of IV hydralazine, BP decreased to 185/99. Spoke with Dr. Sanches and he accepted pt back to [facility].  On , pt has improved and vitals are now stable and has been transferred back to [place].    At [facility] pt and family met with Palliative Care and the decision was made to withdraw care. HD was stopped and Comfort Care orders were placed. The pt  on  at 0600.    Zeus Benedict MD  [facility] Hospitalist.

## 2024-08-04 NOTE — PLAN OF CARE
Goal Outcome Evaluation:       Pt lying in bed looks comfortable. Open her eyes when you call her names.  and her daughters by her bed side, Very attentive with her cares and comfort medications. At the beginning of the shift, her daughter requested IV Dilaudid for shortness of breath..PIV was leaking and unable to give IV dilaudid immediately. Instead Sublingual  oxycodone given x  with effect.New PIV inserted by SWAT nurse on right  arm. IV  dilaudid given at 2142 by SWAT nurse. Last Dilaudid given at 2304. Family requested to give dilaudid every one hour. NOC shift nurse aware.New PIV  patent and intact at this time.Turned and positioned when family needed. Bilateral toes are purplish and cold to touch. No new skin break maira at this time. Will continue monitoring.  Problem: Adult Inpatient Plan of Care  Goal: Absence of Hospital-Acquired Illness or Injury  Intervention: Prevent Infection  Recent Flowsheet Documentation  Taken 8/3/2024 1700 by Khris Love RN  Infection Prevention: hand hygiene promoted  Goal: Optimal Comfort and Wellbeing  Intervention: Provide Person-Centered Care  Recent Flowsheet Documentation  Taken 8/3/2024 1700 by Khris Love RN  Trust Relationship/Rapport:   emotional support provided   care explained     Problem: Mechanical Ventilation Invasive  Goal: Effective Communication  Intervention: Ensure Effective Communication  Recent Flowsheet Documentation  Taken 8/3/2024 1700 by Khris Love RN  Family/Support System Care: support provided  Trust Relationship/Rapport:   emotional support provided   care explained     Problem: Anxiety Signs/Symptoms  Goal: Enhanced Social, Occupational or Functional Skills (Anxiety Signs/Symptoms)  Intervention: Promote Social, Occupational and Functional Ability  Recent Flowsheet Documentation  Taken 8/3/2024 1700 by Khris Love RN  Trust Relationship/Rapport:   emotional support provided   care explained     Problem:  Wound  Goal: Optimal Coping  Intervention: Support Patient and Family Response  Recent Flowsheet Documentation  Taken 8/3/2024 1700 by Khris Love RN  Family/Support System Care: support provided  Goal: Absence of Infection Signs and Symptoms  Intervention: Prevent or Manage Infection  Recent Flowsheet Documentation  Taken 8/3/2024 1700 by Khris Love RN  Isolation Precautions: contact precautions maintained     Problem: Hemodialysis  Goal: Absence of Infection Signs and Symptoms  Intervention: Prevent or Manage Infection  Recent Flowsheet Documentation  Taken 8/3/2024 1700 by Khris Love RN  Infection Prevention: hand hygiene promoted

## 2024-08-04 NOTE — PROGRESS NOTES
Cross cover call - RN called to see patient     Unresponsive, no spontaneous breathing. No HR, no RR, fixed dilated pupils    Pronounced dead at 600 AM on August 4, 2024    A- ILD s/p bilateral lung transplant , Acute respiratory failure, pneumonia     Plans  - Post mortem care  - Will inform AM doc     Family at bedside

## 2024-08-04 NOTE — PLAN OF CARE
Patient body removed from room and transported to [facility]. Awaiting transportation from the [facility]. All belongings sent home with family.   Lonnie Mohan RN

## 2024-08-04 NOTE — PROGRESS NOTES
Patient was on Comfort . Family was at bedside supportive . Patient passed away at 0600 and confirmed by Dr Anuj Bonilla . Post mortem checklist in progress . Patient is donation ineligible . Plan to send the patient to [facility] for autopsy per transplant team.

## 2024-08-08 ENCOUNTER — POST MORTEM DOCUMENTATION (OUTPATIENT)
Dept: TRANSPLANT | Facility: CLINIC | Age: 64
End: 2024-08-08

## 2024-08-08 NOTE — PROGRESS NOTES
Received notification on 24 at 1255 of patient's death from epic message, contact information is see chart.  Place of death was reported as LTACH.  Graft status at the time of death was reported as Unknown.  Additional information:   TIS verification is: Pending  The Transplant Office has been notified that patient is . The Post Mortem Encounter has been completed. Notifications have been sent to the Care team and Social Work.   Instructions have been sent to send a sympathy card to the family.     SHIVANI DORSEY  Received notification of patient's death from [employer].  Place of death was reported as [facility].  Graft status at the time of death was reported as Not functioning.  Additional information: Intubated 2024. Unable to wean vent. Trach placed  7/3/2024. Transferred to LTACH  in severe respiratory distress. Decision made to withdraw care and patient passed  at 0600.  TIS verification is: Complete

## 2024-08-14 LAB
ACID FAST STAIN (ARUP): NORMAL

## 2024-08-17 LAB
ACID FAST STAIN (ARUP): NORMAL
ACID FAST STAIN (ARUP): NORMAL

## 2024-08-29 LAB
ACID FAST STAIN (ARUP): NORMAL

## 2024-09-11 NOTE — TELEPHONE ENCOUNTER
DATE:  12/10/2019   TIME OF RECEIPT FROM LAB:  11:50  LAB TEST:  BUN  LAB VALUE:  109.7  LAB TEST:  CO2  LAB VALUE:  17.0  LAB TEST:  Alkaline Phosphate  LAB VALUE:  305  RESULTS GIVEN WITH READ-BACK TO (PROVIDER):  Mikayla Latham  TIME LAB VALUE REPORTED TO PROVIDER:   11:55     Please reply to pool: EM RN TRIAGE  Action Requested: Summary for Provider     []  Critical Lab, Recommendations Needed  [x] Need Additional Advice  []   FYI    []   Need Orders  [] Need Medications Sent to Pharmacy  []  Other     SUMMARY: Patient contacts clinic reporting no improvement of urinary symptoms since completing antibiotics 3 days ago.  Ongoing burning with urination.  Chronic back pain, denies new pain.  Denies fever, flank or side pain, nausea or vomiting.  Burning is making erections difficult to maintain.  Nurse advised appointment.  Patient declines appointment with another provider and is unable to come to the office before 5 pm.  Requests message be sent to Dr. Guerrero for further testing advice.  Nurse advised hydration, report to immediate care for any progression of symptoms.  He is aware Dr. Guerrero is out of office today and will await his reply when he returns.      Reason for call: Dysuria  Onset: Data Unavailable                       Reason for Disposition   All other urine symptoms    Protocols used: Urinary Symptoms-A-OH

## 2024-10-07 NOTE — NURSING NOTE
"Chief Complaint   Patient presents with     RECHECK     S/P Lung TX       /79 (BP Location: Left arm, Patient Position: Sitting, Cuff Size: Adult Small)  Pulse 99  Temp 98.1  F (36.7  C) (Oral)  Resp 18  Ht 1.626 m (5' 4\")  Wt 50.6 kg (111 lb 9.6 oz)  LMP 06/07/2014 (Exact Date)  SpO2 97%  BMI 19.16 kg/m2      Gianna Rogers [facility]  10/29/2018 7:22 AM      " [place]

## 2024-10-10 NOTE — NURSING NOTE
Transplant Coordinator Note    Reason for visit: Post lung transplant follow up visit   Coordinator: Present (Airam in person)  Caregiver:  Pt's     Health concerns addressed today:  1. Bronch on 11/17  2. Respiratory: difficulty catching breath on Saturday night - restarted using 2 L NC; cough continues with clear sputum production; wheeze at night  3. Feels tired from being so sick recently    4. Followed with ID - okay to not be on augmentin after bronch  5. GI/: no nausea and BMs normalized per pt  6. Rash improved since hospitalization  7. Plan for palliative care follow up    Activity/rehab: [place]; uses wheelchair for long distances  Oxygen needs: 2L NC during day and night, but might need more  Pain management/RX: Denies  High risk donor: Yes  CMV status: D+/R+  DVT/PE: H/o DVT  Post op AFIB/follow up with EP: One time occurrence of a-fib  PJP prophylactic: Bactrim     COVID:  COVID-19 infection (yes/no, date of most recent positive test):   Status/instructions given about COVID-19 vaccine:     Pt Education: medications (use/dose/side effects), how/when to call coordinator, frequency of labs, s/s of infection/rejection, call prior to starting any new medications, lab/vital sign book    Health Maintenance:   Last colonoscopy:   Next colonoscopy due:   Dermatology:  Vaccinations this visit:     Labs, CXR, PFTs reviewed with patient  Medication record reviewed and reconciled  Questions and concerns addressed    Patient Instructions  1. Continue to hydrate with 70 oz fluids daily.  2. Continue to exercise daily or most days of the week.  3. Follow up with your primary care provider for annual gender health maintenance procedures.  4. Follow up with colonoscopy schedule.  5. Follow up with annual dermatology visits.  6. Do a 6 minute walk test locally.  7. Give a sputum sample.  8. Plan to restart pulmonary rehab.  9. Palliative care referral placed.  10. Plan for high dose steroid taper.  Continue to  take   11. Use albuterol nebs 3-4 times per day.  12. Use mucomyst nebs as needed.  13. Plan for Peter to follow up with you about your meds and dialysis questions.  14. Start taking mucinex to help thin and break up your sputum.  15. Get one dose of neupogen.  16. Lower your azathioprine dose to 25 mg daily.  17. Plan for overnight oximetry study on 2 L.    Next transplant clinic appointment:  3-4 weeks with CXR, labs and PFTs  Next lab draw: weekly    AVS printed at time of check out       - - -

## 2024-11-21 NOTE — TELEPHONE ENCOUNTER
CC: Well woman exam    Joaquín Bruce is a 57 y.o. female  presents for a well woman exam.  LMP: Patient's last menstrual period was 2024..  She is having hot flashes  She would like to avoid HRT    Past Medical History:   Diagnosis Date    Genital herpes     Hyperlipidemia     Hypertension     Morbid obesity with BMI of 40.0-44.9, adult     ETIENNE (obstructive sleep apnea)      Past Surgical History:   Procedure Laterality Date    abdominal plasty  2017    APPENDECTOMY      open, took one ovary and fallopian tubes as well    COLONOSCOPY N/A 2017    Procedure: COLONOSCOPY - Miralax split prep;  Surgeon: Daja Self MD;  Location: [facility];  Service: Endoscopy;  Laterality: N/A;    GASTRECTOMY      HERNIA REPAIR      OOPHORECTOMY      THIGH LIFT       Social History     Socioeconomic History    Marital status: Single   Tobacco Use    Smoking status: Never    Smokeless tobacco: Never   Substance and Sexual Activity    Alcohol use: Yes     Comment: OCC    Drug use: No    Sexual activity: Not Currently     Partners: Male     Birth control/protection: None   Social History Narrative    Works full time as an : [employer].  Single. Lives alone.  Good support system with family and friends.     Social Drivers of Health     Financial Resource Strain: Low Risk  (2024)    Overall Financial Resource Strain (CARDIA)     Difficulty of Paying Living Expenses: Not hard at all   Food Insecurity: No Food Insecurity (2024)    Hunger Vital Sign     Worried About Running Out of Food in the Last Year: Never true     Ran Out of Food in the Last Year: Never true   Transportation Needs: No Transportation Needs (2023)    PRAPARE - Transportation     Lack of Transportation (Medical): No     Lack of Transportation (Non-Medical): No   Physical Activity: Insufficiently Active (2024)    Exercise Vital Sign     Days of Exercise per Week: 2 days     Minutes of Exercise per  Tacrolimus level 15.9 at 12 hours, on 12/26/19  Goal 8-10.   Current dose 1 mg in AM, 1 mg in PM    Dose changed to 1 mg in AM, 0.5 mg in PM   Recheck level in 7 days (she's getting full set of labs tomorrow, 12/31/19, but will skip tacro this week given dose change today and do next week with labs).     Discussed with patient.   [employer] message sent.    Patient states she has the number for IR to schedule liver biopsy, was not able to reach IR dept last week to schedule, but will call again today to get this set up.    Session: 20 min   Stress: No Stress Concern Present (2024)    [employer] [employer] - Occupational Stress Questionnaire     Feeling of Stress : Only a little   Housing Stability: Low Risk  (2023)    Housing Stability Vital Sign     Unable to Pay for Housing in the Last Year: No     Number of Places Lived in the Last Year: 1     Unstable Housing in the Last Year: No     Family History   Problem Relation Name Age of Onset    Cancer Mother          breast, in remission x 5 years    Breast cancer Mother      Hypertension Father  of MI 33 33     Heart disease Father  of MI 33 33     No Known Problems Sister      Breast cancer Sister  50    Obesity Sister      Obesity Sister      Ovarian cancer Maternal Aunt          maternal grandmother's sister dx age unkown    Colon cancer Maternal Uncle      Stroke Maternal Grandmother       OB History          1    Para   0    Term   0            AB   1    Living             SAB        IAB   1    Ectopic        Multiple        Live Births                     /83 (Patient Position: Sitting)   Wt 101.6 kg (223 lb 15.8 oz)   LMP 2024 Comment: 1 day cycle  BMI 37.27 kg/m²       ROS:    ROS:  GENERAL: Denies weight gain or weight loss. Feeling well overall.   SKIN: Denies rash or lesions.   HEAD: Denies head injury or headache.   NODES: Denies enlarged lymph nodes.   CHEST: Denies chest pain or shortness of breath.   CARDIOVASCULAR: Denies palpitations or left sided chest pain.   ABDOMEN: No abdominal pain, constipation, diarrhea, nausea, vomiting or rectal bleeding.   URINARY: No frequency, dysuria, hematuria, or burning on urination.  REPRODUCTIVE: See HPI.   BREASTS: The patient performs breast self-examination and denies pain, lumps, or nipple discharge.   HEMATOLOGIC: No easy bruisability or excessive bleeding.   MUSCULOSKELETAL: Denies joint pain or swelling.   NEUROLOGIC: Denies syncope or weakness.   PSYCHIATRIC:  Denies depression, anxiety or mood swings.    PHYSICAL EXAM:    APPEARANCE: Well nourished, well developed, in no acute distress.  AFFECT: WNL, alert and oriented x 3  SKIN: No acne or hirsutism  NECK: Neck symmetric without masses or thyromegaly  NODES: No inguinal, cervical, axillary, or femoral lymph node enlargement  CHEST: Good respiratory effect  ABDOMEN: Soft.  No tenderness or masses.  No hepatosplenomegaly.  No hernias.  BREASTS: Symmetrical, no skin changes or visible lesions.  No palpable masses, nipple discharge bilaterally.  PELVIC: Normal external genitalia without lesions.  Normal hair distribution.  Adequate perineal body, normal urethral meatus.  Vagina moist and well rugated without lesions or discharge.  Cervix pink, without lesions, discharge or tenderness.  No significant cystocele or rectocele.  Bimanual exam shows uterus to be normal size, regular, mobile and nontender.  Adnexa without masses or tenderness.    RECTAL: Rectovaginal exam confirms above with normal sphincter tone, no masses.  EXTREMITIES: No edema.      ICD-10-CM ICD-9-CM    1. Encounter for gynecological examination without abnormal finding  Z01.419 V72.31 Liquid-Based Pap Smear, Screening      HPV High Risk Genotypes, PCR      2. Hot flash, menopausal  N95.1 627.2 fezolinetant (VEOZAH) 45 mg Tab        MMG 5/24    Patient was counseled today on A.C.S. Pap guidelines and recommendations for yearly pelvic exams, mammograms and monthly self breast exams; to see her PCP for other health maintenance.     Follow up in about 1 year (around 11/21/2025).

## 2025-02-13 NOTE — PROGRESS NOTES
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 Nephrology consulted for management of HD while admitted.      Interval History :   Mrs Blue is planned for HD today on TTS schedule, will pull 2-3L with pre-HD wt of ~54kg. Stable from HD standpoint, anticipate discharge later this week, may need a short run to change schedule but will adjust as plans come together.          Assessment & Recommendations:   ESRD-ESKD: pt dialyzes MWF at [facility] (ph [phone], f [phone]) with Dr. Glasgow. Run time: 3.5 hrs. EDW 52.5 kg. Access: L AVF. +heparin 1,500u bolus.       -HD today on TTS for now unless discharge planning dictates otherwise.                  -No need for new dialysis consent, continuing long term HD for ESRD.                -Appreciate IR doing plasty 7/8 to fistula, temp line pulled.       Outpt Rx:       Volume-Wt as low as 49kg during her course (although may have been dry at the time as she needed some neosynephrine), EDW likely a bit lower with ICU stay ~50-51kg.      Pulm-Admitted with hypercapnic resp failure, treated for PNA. Trached, progressing well.       Electrolytes-Stable.     BMD-No acute issues.      Anemia-Hgb 7.9, last PRBC's 6/26 on venofer 50mg weekly but will hold while treating for infection. On Mircera 60mcg [id number], will cover with short term 4k Epo with runs while admitted when stable enough for HD.       Nutrition-Novasource renal      Time spent: 40 minutes on this date of encounter for chart review, physical exam, medical decision making and co-ordination of care.      Recommendations were  "communicated to primary team via verbal communication.     Hardy Tran, APRN CNS  Clinical Nurse Specialist  [phone]    Review of Systems:   I reviewed the following systems:  Gen: No fevers or chills  CV: No CP at rest  Resp: No SOB at rest  GI: No N/V    Physical Exam:   I/O last 3 completed shifts:  In: 1350 [NG/GT:720]  Out: -    /58   Pulse 78   Temp 97.6  F (36.4  C) (Axillary)   Resp 18   Ht 1.6 m (5' 3\")   Wt 54.2 kg (119 lb 7.8 oz)   LMP 06/07/2014 (Exact Date)   SpO2 100%   BMI 21.17 kg/m       GENERAL APPEARANCE: Vent via trach   EYES: No scleral icterus  Pulmonary: Stable on vent  CV: Regular rhythm, normal rate   - Edema none  GI: soft, nontender, normal bowel sounds  MS: no evidence of inflammation in joints, no muscle tenderness  : No Basilio  SKIN: no rash, warm, dry  NEURO: No focal deficits    Labs:   All labs reviewed by me  Electrolytes/Renal -   Recent Labs   Lab Test 07/16/24  0547 07/16/24  0429 07/16/24  0016 07/15/24  0856 07/15/24  0641 07/14/24  0816 07/14/24  0656   *  --   --   --  133*  --  133*   POTASSIUM 3.1*  --   --   --  3.1*  --  3.6   CHLORIDE 92*  --   --   --  95*  --  97*   CO2 24  --   --   --  25  --  27   BUN 86.1*  --   --   --  59.7*  --  33.9*   CR 2.84*  --   --   --  2.23*  --  1.50*   * 189* 172*   < > 157*   < > 187*   CHELSEY 7.9*  --   --   --  8.3*  --  8.4*   MAG 1.9  --   --   --  1.9  --  1.7   PHOS 5.0*  --   --   --  4.5  --  3.3    < > = values in this interval not displayed.       CBC -   Recent Labs   Lab Test 07/16/24  0547 07/15/24  0641 07/14/24  0656   WBC 2.7* 2.5* 2.4*   HGB 7.9* 7.8* 7.6*    150 153       LFTs -   Recent Labs   Lab Test 07/11/24  0518 07/02/24  0359 07/01/24  1637 07/01/24  0346   ALKPHOS 282* 176*  --  173*   BILITOTAL 0.4 0.6  --  0.6   ALT 13 37  --  36   AST 30 26  --  25   PROTTOTAL 5.1* 5.4*  --  5.0*   ALBUMIN 2.6* 2.8* 2.7* 2.6*       Iron Panel -   Recent Labs   Lab Test 02/03/21  0415 " 12/13/18  1033 08/01/18  0921   IRON 51 16* 93   IRONSAT 36 7* 37   YOLA  --  302* 571*           Current Medications:  Current Facility-Administered Medications   Medication Dose Route Frequency Provider Last Rate Last Admin    acetylcysteine (MUCOMYST) 10 % nebulizer solution 4 mL  4 mL Inhalation BID Velez Reyes, German, MD   4 mL at 07/15/24 1640    [Held by provider] azithromycin (ZITHROMAX) half-tab 125 mg  125 mg Per J Tube Daily Rufina Huff CNP        chlorhexidine (PERIDEX) 0.12 % solution 15 mL  15 mL Mouth/Throat Q12H Chio Cortez MD   15 mL at 07/15/24 1936    fiber modular (BANATROL TF) packet 1 packet  1 packet Per Feeding Tube Q8H Awa Watt MD   1 packet at 07/16/24 0156    heparin ANTICOAGULANT injection 5,000 Units  5,000 Units Subcutaneous Q8H Jailyn Lou MD   5,000 Units at 07/16/24 0621    insulin aspart (NovoLOG) injection (RAPID ACTING)  1-12 Units Subcutaneous Q4H Edin Davis MD   2 Units at 07/16/24 0430    insulin NPH injection 8 Units  8 Units Subcutaneous BID Velez Reyes, German, MD   8 Units at 07/15/24 1936    levalbuterol (XOPENEX) neb solution 0.63 mg  0.63 mg Nebulization 4x Daily Gareth Mosquera MD   0.63 mg at 07/15/24 1950    metoprolol tartrate (LOPRESSOR) tablet 25 mg  25 mg Per J Tube BID Rufina Huff CNP   25 mg at 07/16/24 0622    montelukast (SINGULAIR) tablet 10 mg  10 mg Per J Tube At Bedtime Rufina Huff CNP   10 mg at 07/15/24 2141    multivitamin RENAL (RENAVITE RX/NEPHROVITE) tablet 1 tablet  1 tablet Per J Tube Daily Rufina Hfuf CNP        pantoprazole (PROTONIX) 2 mg/mL suspension 40 mg  40 mg Per J Tube Daily Rufina Huff CNP   40 mg at 07/15/24 1938    posaconazole (NOXAFIL) DR tablet TBEC 300 mg  300 mg Per Feeding Tube Daily Josesito Pratt MD   300 mg at 07/15/24 1400    [Held by provider] predniSONE (DELTASONE) tablet 5 mg  5 mg Oral Daily Rossana Sarabia, APRN  CNP   5 mg at 07/11/24 0856    predniSONE (DELTASONE) tablet 50 mg  50 mg Per J Tube Daily Rufina Huff CNP        Prosource TF20 ENfit Compatibl EN LIQD (PROSOURCE TF20) packet 1 packet  1 packet Per J Tube Daily Rufina Huff CNP        QUEtiapine (SEROquel) tablet 25 mg  25 mg Per J Tube At Bedtime Rufina Huff CNP   25 mg at 07/15/24 2141    ramelteon (ROZEREM) tablet 8 mg  8 mg Per J Tube At Bedtime Rufina Huff CNP   8 mg at 07/15/24 1939    sodium chloride (NEBUSAL) 3 % neb solution 3 mL  3 mL Nebulization BID Velez Reyes, German, MD   3 mL at 07/15/24 1950    sodium chloride (PF) 0.9% PF flush 10 mL  10 mL Intracatheter Q8H Kajal Chong APRN CNP   20 mL at 07/16/24 0018    sulfamethoxazole-trimethoprim (BACTRIM) 400-80 MG per tablet 1 tablet  1 tablet Per J Tube Once per day on Tuesday Thursday Saturday Rufina Huff CNP        tacrolimus (GENERIC) suspension 1 mg  1 mg Per G Tube QAM Rufina Huff CNP   1 mg at 07/15/24 0847    And    tacrolimus (GENERIC) suspension 1.5 mg  1.5 mg Per G Tube QPM Rufina Huff CNP   1.5 mg at 07/15/24 1810    traZODone (DESYREL) half-tab 25 mg  25 mg Per J Tube At Bedtime Rufina Huff CNP   25 mg at 07/15/24 2142    valGANciclovir (VALCYTE) solution 450 mg  450 mg Per J Tube Once per day on Tuesday Saturday Rufina Huff CNP        [START ON 7/17/2024] Vitamin D3 (CHOLECALCIFEROL) tablet 50 mcg  50 mcg Per J Tube Once per day on Monday Wednesday Friday Rufina Huff CNP         Current Facility-Administered Medications   Medication Dose Route Frequency Provider Last Rate Last Admin    dextrose 10% infusion   Intravenous Continuous PRN Velez Reyes, German, MD   Stopped at 07/03/24 1600            Former smoker

## 2025-02-17 NOTE — TELEPHONE ENCOUNTER
Tacrolimus level 7.3 at 12 hours, on 4/23/19  Goal 9-11.   Current dose 3 mg in AM, 3 mg in PM    Dose changed to  3 mg in AM, 3.5 mg in PM   Recheck level in 7-10 days    Discussed with Kecia Lin message sent     
Statement Selected

## (undated) DEVICE — ENDO VALVE SUCTION BRONCH EVIS MAJ-209

## (undated) DEVICE — DRSG KERLIX 4 1/2"X4YDS ROLL 6715

## (undated) DEVICE — ENDO TOOTH GUARD SAC2001

## (undated) DEVICE — KIT ENDO FIRST STEP DISINFECTANT 200ML W/POUCH EP-4

## (undated) DEVICE — CATH BALLOON ELATION PULMONARY 2CM 8-9-10MMX100CM P8L20

## (undated) DEVICE — ANTIFOG SOLUTION W/FOAM PAD 31142527

## (undated) DEVICE — CLIP HORIZON SM RED WIDE SLOT 001201

## (undated) DEVICE — ENDO ADPT BRONCH SWIVEL Y A1002

## (undated) DEVICE — SYR 10ML SLIP TIP W/O NDL 303134

## (undated) DEVICE — LINEN TOWEL PACK X5 5464

## (undated) DEVICE — FIBER LSR 2MR 1.04MM .5MM ACUPULSE FIBERLASE 40W 295UM CO2

## (undated) DEVICE — SUCTION CATH AIRLIFE TRI-FLO W/CONTROL PORT 14FR  T60C

## (undated) DEVICE — IMPLANTABLE DEVICE: Type: IMPLANTABLE DEVICE | Site: BRONCHUS | Status: NON-FUNCTIONAL

## (undated) DEVICE — VESSEL LOOPS YELLOW MAXI 31145694

## (undated) DEVICE — CATH BALLOON ELATION PULMONARY 3CM 10-11-12MMX100CM P10L30

## (undated) DEVICE — ENDO VALVE BX EVIS MAJ-210

## (undated) DEVICE — SPECIMEN TRAP MUCOUS 40ML LUKI C30200A

## (undated) DEVICE — PITCHER STERILE 1000ML  SSK9004A

## (undated) DEVICE — SU PROLENE 4-0 RB-1DA 36" 8557H

## (undated) DEVICE — CONNECTOR OMNI-FLEX 3222

## (undated) DEVICE — LABEL MEDICATION SYSTEM 3303-P

## (undated) DEVICE — TOURNIQUET VASCULAR KIT 7 1/2" 79012

## (undated) DEVICE — SUTURE BOOTS 051003PBX

## (undated) DEVICE — DRAPE U SPLIT 74X120" 29440

## (undated) DEVICE — SYR 50ML SLIP TIP W/O NDL 309654

## (undated) DEVICE — SU STEEL 6 CCS 4X18" M654G

## (undated) DEVICE — CONNECTOR STOPCOCK 3 WAY MALE LL HI-FLO MX9311L

## (undated) DEVICE — TUBING SUCTION 10'X3/16" N510

## (undated) DEVICE — SYR 50ML LL W/O NDL 309653

## (undated) DEVICE — ADH BIOGLUE CARTRIDGE 10ML BG3510-5-US

## (undated) DEVICE — CATH BALLOON ELATION PULMONARY 3CM 8-9-10MMX100CM P8L30

## (undated) DEVICE — INFLATION DEVICE BIG 60 ENDO-AN6012

## (undated) DEVICE — Device

## (undated) DEVICE — DRAPE SHEET MED 44X70" 9355

## (undated) DEVICE — SU SILK 2-0 TIE 12X30" A305H

## (undated) DEVICE — SU PROLENE 2-0 SHDA 36" 8523H

## (undated) DEVICE — SU PLEDGET SOFT TFE 3/8"X3/26"X1/16" PCP40

## (undated) DEVICE — SOL WATER IRRIG 1000ML BOTTLE 07139-09

## (undated) DEVICE — CLIP HORIZON MED BLUE 002200

## (undated) DEVICE — TUBING IRRIG CYSTO/BLADDER SET 81" LF 2C4040

## (undated) DEVICE — ESU PENCIL W/COATED BLADE E2450H

## (undated) DEVICE — SOL NACL 0.9% IRRIG 1000ML BOTTLE 2F7124

## (undated) DEVICE — DRSG TELFA 3X8" 1238

## (undated) DEVICE — CUP AND LID 2PK 2OZ STERILE  SSK9006A

## (undated) DEVICE — ENDO FORCEP ALLIGATOR JAW BIOPSY 2MMX100CM FB-211D

## (undated) DEVICE — BONE WAX 2.5GM W31G

## (undated) DEVICE — SPONGE KITTNER 30-101

## (undated) DEVICE — JELLY LUBRICATING SURGILUBE 2OZ TUBE

## (undated) DEVICE — SU ETHIBOND 2-0 SHDA 30" X563H

## (undated) DEVICE — TUBING PRESSURE MONITOR M/F CONNECTOR 48" 50P148

## (undated) DEVICE — ESU ELEC BLADE 6" COATED E1450-6

## (undated) DEVICE — SU ETHIBOND 0 TIE 6X30" X306H

## (undated) DEVICE — KIT RIGHT HEART CATH 60130719

## (undated) DEVICE — SU PROLENE 4-0 SHDA 36" 8521H

## (undated) DEVICE — SOL NACL 0.9% IRRIG 1000ML BOTTLE 07138-09

## (undated) DEVICE — TUBING SUCTION MEDI-VAC SOFT 3/16"X20' N520A

## (undated) DEVICE — PROBE RECTAL MONATHERM 9FR 90050

## (undated) DEVICE — SOL NACL 0.9% INJ 1000ML BAG 2B1324X

## (undated) DEVICE — PREP CHLORAPREP 26ML TINTED ORANGE  260815

## (undated) DEVICE — SUCTION CANISTER 3000ML

## (undated) DEVICE — DRAIN CHEST TUBE 28FR STR 8028

## (undated) DEVICE — SYR 30ML SLIP TIP W/O NDL 302833

## (undated) DEVICE — ENDO BRUSH CYTOLOGY 2.0MMX10MM 115CM BRONCH BC-202D-2010

## (undated) DEVICE — PACK HEART RIGHT CUSTOM SAN32RHF18

## (undated) DEVICE — SU VICRYL 0 CTX 36" J370H

## (undated) DEVICE — SU ETHIBOND 2-0TIE 30" X305H

## (undated) DEVICE — DRSG BIOPATCH GERMICIDAL SPLIT SPONGE 7MM LG

## (undated) DEVICE — WIPES FOLEY CARE SURESTEP PROVON DFC100

## (undated) DEVICE — NDL BLUNT 18GA 1" W/O FILTER 305181

## (undated) DEVICE — STPL ENDO RELOAD 45MM VASCULAR MEDIUM TAN EGIA45AVM

## (undated) DEVICE — DECANTER BAG 2002S

## (undated) DEVICE — BLADE CLIPPER SGL USE 9680

## (undated) DEVICE — ENDO VALVE SUCTION ULTRASOUND BRONCH MAJ-1414

## (undated) DEVICE — STPL ENDO RELOAD SIG GIA CVD TIP TAN 45MM MED SIG45CTAVM

## (undated) DEVICE — SU PROLENE 5-0 RB-2DA 30" 8710H

## (undated) DEVICE — SU PLEDGET SOFT TFE 13MMX7MMX1.5MM D7044

## (undated) DEVICE — SU VICRYL 3-0 SH 27" UND J416H

## (undated) DEVICE — SU SILK 4-0 TIE 12X30" A303H

## (undated) DEVICE — SOL NACL 0.9% IRRIG 3000ML BAG 2B7477

## (undated) DEVICE — CATH BALLOON ELATION PULM 5.5CM 12-13.5-15MMX100CM P12L55

## (undated) DEVICE — ESU GROUND PAD ADULT W/CORD E7507

## (undated) DEVICE — CATH BALLOON ELATION PULMONARY 2CM 10-11-12MMX100CM P10L20

## (undated) DEVICE — ENDO FORCEP ENDOJAW BIOPSY 2.8MMX160CM FB-220K

## (undated) DEVICE — LASER FIBER CO2 40W ACUPULSE AC-1059590

## (undated) DEVICE — TOURNIQUET VASCULAR KIT ARGYLE 8888-585000

## (undated) DEVICE — GUIDEWIRE AMPLATZ SUPER STIFF 0.035"X180CM M001465250

## (undated) DEVICE — SPONGE RAY-TEC 4X8" 7318

## (undated) DEVICE — INTRO SHEATH 7FRX10CM PINNACLE RSS702

## (undated) DEVICE — TIES BANDING T50R

## (undated) DEVICE — CONNECTOR BLAKE DRAIN SGL BCC1

## (undated) DEVICE — DRAPE SLUSH/WARMER 66X44" ORS-320

## (undated) DEVICE — GLOVE PROTEXIS MICRO 7.0  2D73PM70

## (undated) DEVICE — CATH BALLOON ELATION PULMONARY 3CM 12-13.5-15MMX100CM P12L30

## (undated) DEVICE — DRAIN CHEST TUBE RIGHT ANGLED 32FR 8132

## (undated) DEVICE — LIGHT HANDLE X1 31140133

## (undated) DEVICE — PREP POVIDONE-IODINE 7.5% SCRUB 4OZ BOTTLE MDS093945

## (undated) DEVICE — DRSG DRAIN 4X4" 7086

## (undated) DEVICE — ESU BIPOLAR SEALER AQUAMANTYS 6MM 23-112-1

## (undated) DEVICE — SU VICRYL 3-0 FS-1 27" J442H

## (undated) DEVICE — POSITIONER ASSIST ESSTECH 3S T401210S

## (undated) DEVICE — SPONGE LAP 18X18" X8435

## (undated) DEVICE — FIBERLASE CO2 FIBER

## (undated) DEVICE — DRSG TEGADERM 4X4 3/4" 1626W

## (undated) DEVICE — LUBRICANT INST KIT ENDO-LUBE 220-90

## (undated) DEVICE — DILATOR CRE PULM BALLOON 8-9-10MMX75CM 3CM WIRE 5033

## (undated) DEVICE — BLADE SAW STERNAL 20X30MM KM-32

## (undated) DEVICE — LINEN TOWEL PACK X6 WHITE 5487

## (undated) DEVICE — DRSG TEGADERM 2 3/8X2 3/4" 1624W

## (undated) DEVICE — PROTECTOR ARM ONE-STEP TRENDELENBURG 40418

## (undated) DEVICE — IMPLANTABLE DEVICE
Type: IMPLANTABLE DEVICE | Site: BRONCHUS | Status: NON-FUNCTIONAL
Removed: 2018-09-10

## (undated) DEVICE — SU ETHIBOND 0 CT-1 CR 8X18" CX21D

## (undated) DEVICE — PREP SKIN SCRUB TRAY 4461A

## (undated) DEVICE — SPONGE PEANUT

## (undated) DEVICE — SUCTION MANIFOLD DORNOCH ULTRA CART UL-CL500

## (undated) DEVICE — SU ETHIBOND 3-0 BBDA 36" X588H

## (undated) DEVICE — NDL PERIVIEW FLEX TBNA 21G NA-403D-2021

## (undated) DEVICE — SPONGE LAP 12X12" 23250-409

## (undated) DEVICE — SU PROLENE 5-0 RB-1DA 36"  8556H

## (undated) DEVICE — HEMOSTAT ABSORBABLE AGENT ARISTA 3GM POWDER SM0002-USA

## (undated) DEVICE — SU DERMABOND ADVANCED .7ML DNX12

## (undated) DEVICE — LINEN TOWEL PACK X30 5481

## (undated) DEVICE — SOL WATER IRRIG 1000ML BOTTLE 2F7114

## (undated) DEVICE — PACK POST OP HEART

## (undated) DEVICE — SUCTION TIP YANKAUER STR K87

## (undated) DEVICE — ESU LIGASURE IMPACT OPEN SEALER/DVDR CVD LG JAW LF4418

## (undated) DEVICE — TUBING IV EXT SET MICRO VOLUME MALE LL ADAPTER 36" 2N3345

## (undated) DEVICE — PREP CHLORHEXIDINE 4% 4OZ (HIBICLENS) 57504

## (undated) DEVICE — DRAPE SHEET REV FOLD 3/4 9349

## (undated) DEVICE — SUCTION MANIFOLD NEPTUNE 2 SYS 4 PORT 0702-020-000

## (undated) DEVICE — PREP POVIDONE-IODINE 10% SOLUTION 4OZ BOTTLE MDS093944

## (undated) DEVICE — BASIN SET SINGLE STERILE 13752-624

## (undated) DEVICE — DRAPE TIBURON CARDIOVASCULAR PERI-GROIN LF 9154

## (undated) DEVICE — SUCTION DRY CHEST DRAIN OASIS 3600-100

## (undated) DEVICE — COVER CAMERA IN-LIGHT DISP LT-C02

## (undated) DEVICE — SPONGE COTTONOID NEURO 1/2"X1/2" 30-054

## (undated) DEVICE — SU VICRYL 2-0 CT-1 27" UND J259H

## (undated) DEVICE — PACK NEURO MINOR UMMC SNE32MNMU4

## (undated) DEVICE — CONNECTOR DRAIN CHEST Y EXTENSION SET 19909

## (undated) DEVICE — DRAPE MAYO STAND 23X54 8337

## (undated) DEVICE — CLIP HORIZON LG ORANGE 004200

## (undated) DEVICE — STPL ENDO HANDLE GIA ULTRA UNIVERSAL STD EGIAUSTND

## (undated) DEVICE — BLADE KNIFE SURG 10 371110

## (undated) DEVICE — SU PROLENE 6-0 C-1DA 30" 8706H

## (undated) DEVICE — BLADE KNIFE SURG 11 371111

## (undated) DEVICE — NDL BLUNT 18GA 1.5" FILTER 305211

## (undated) DEVICE — ENDO SYSTEM WATER BOTTLE & TUBING W/CO2 FILTER 00711549

## (undated) DEVICE — PACK ADULT HEART UMMC PV15CG92D

## (undated) DEVICE — SYR 10ML LL W/O NDL 302995

## (undated) DEVICE — DRAPE IOBAN INCISE 23X17" 6650EZ

## (undated) DEVICE — SOL NACL 0.9% 10ML VIAL 0409-4888-02

## (undated) DEVICE — SU SILK 3-0 TIE 12X30" A304H

## (undated) RX ORDER — LIDOCAINE HYDROCHLORIDE 20 MG/ML
INJECTION, SOLUTION EPIDURAL; INFILTRATION; INTRACAUDAL; PERINEURAL
Status: DISPENSED
Start: 2018-03-01

## (undated) RX ORDER — LIDOCAINE HYDROCHLORIDE 10 MG/ML
INJECTION, SOLUTION EPIDURAL; INFILTRATION; INTRACAUDAL; PERINEURAL
Status: DISPENSED
Start: 2023-01-01

## (undated) RX ORDER — ONDANSETRON 2 MG/ML
INJECTION INTRAMUSCULAR; INTRAVENOUS
Status: DISPENSED
Start: 2018-10-11

## (undated) RX ORDER — ONDANSETRON 2 MG/ML
INJECTION INTRAMUSCULAR; INTRAVENOUS
Status: DISPENSED
Start: 2018-11-20

## (undated) RX ORDER — IPRATROPIUM BROMIDE AND ALBUTEROL SULFATE 2.5; .5 MG/3ML; MG/3ML
SOLUTION RESPIRATORY (INHALATION)
Status: DISPENSED
Start: 2018-08-02

## (undated) RX ORDER — PROPOFOL 10 MG/ML
INJECTION, EMULSION INTRAVENOUS
Status: DISPENSED
Start: 2022-02-11

## (undated) RX ORDER — LABETALOL 20 MG/4 ML (5 MG/ML) INTRAVENOUS SYRINGE
Status: DISPENSED
Start: 2019-06-06

## (undated) RX ORDER — DEXAMETHASONE SODIUM PHOSPHATE 4 MG/ML
INJECTION, SOLUTION INTRA-ARTICULAR; INTRALESIONAL; INTRAMUSCULAR; INTRAVENOUS; SOFT TISSUE
Status: DISPENSED
Start: 2021-04-30

## (undated) RX ORDER — LIDOCAINE HYDROCHLORIDE 10 MG/ML
INJECTION, SOLUTION EPIDURAL; INFILTRATION; INTRACAUDAL; PERINEURAL
Status: DISPENSED
Start: 2024-01-01

## (undated) RX ORDER — ALBUTEROL SULFATE 0.83 MG/ML
SOLUTION RESPIRATORY (INHALATION)
Status: DISPENSED
Start: 2018-04-10

## (undated) RX ORDER — FENTANYL CITRATE 50 UG/ML
INJECTION, SOLUTION INTRAMUSCULAR; INTRAVENOUS
Status: DISPENSED
Start: 2019-10-11

## (undated) RX ORDER — REGADENOSON 0.08 MG/ML
INJECTION, SOLUTION INTRAVENOUS
Status: DISPENSED
Start: 2023-01-01

## (undated) RX ORDER — CEFAZOLIN SODIUM 1 G/3ML
INJECTION, POWDER, FOR SOLUTION INTRAMUSCULAR; INTRAVENOUS
Status: DISPENSED
Start: 2018-03-03

## (undated) RX ORDER — FENTANYL CITRATE 50 UG/ML
INJECTION, SOLUTION INTRAMUSCULAR; INTRAVENOUS
Status: DISPENSED
Start: 2021-06-11

## (undated) RX ORDER — EPHEDRINE SULFATE 50 MG/ML
INJECTION, SOLUTION INTRAMUSCULAR; INTRAVENOUS; SUBCUTANEOUS
Status: DISPENSED
Start: 2024-01-01

## (undated) RX ORDER — LIDOCAINE HYDROCHLORIDE 20 MG/ML
INJECTION, SOLUTION EPIDURAL; INFILTRATION; INTRACAUDAL; PERINEURAL
Status: DISPENSED
Start: 2019-10-11

## (undated) RX ORDER — PROPOFOL 10 MG/ML
INJECTION, EMULSION INTRAVENOUS
Status: DISPENSED
Start: 2021-02-19

## (undated) RX ORDER — ROCURONIUM BROMIDE 50 MG/5 ML
SYRINGE (ML) INTRAVENOUS
Status: DISPENSED
Start: 2018-03-01

## (undated) RX ORDER — HEPARIN SODIUM 1000 [USP'U]/ML
INJECTION, SOLUTION INTRAVENOUS; SUBCUTANEOUS
Status: DISPENSED
Start: 2024-01-01

## (undated) RX ORDER — CEFAZOLIN SODIUM 1 G/3ML
INJECTION, POWDER, FOR SOLUTION INTRAMUSCULAR; INTRAVENOUS
Status: DISPENSED
Start: 2018-12-14

## (undated) RX ORDER — ETOMIDATE 2 MG/ML
INJECTION INTRAVENOUS
Status: DISPENSED
Start: 2018-03-03

## (undated) RX ORDER — FENTANYL CITRATE 50 UG/ML
INJECTION, SOLUTION INTRAMUSCULAR; INTRAVENOUS
Status: DISPENSED
Start: 2018-10-29

## (undated) RX ORDER — PROPOFOL 10 MG/ML
INJECTION, EMULSION INTRAVENOUS
Status: DISPENSED
Start: 2018-11-20

## (undated) RX ORDER — FENTANYL CITRATE 50 UG/ML
INJECTION, SOLUTION INTRAMUSCULAR; INTRAVENOUS
Status: DISPENSED
Start: 2023-01-01

## (undated) RX ORDER — ONDANSETRON 2 MG/ML
INJECTION INTRAMUSCULAR; INTRAVENOUS
Status: DISPENSED
Start: 2019-10-11

## (undated) RX ORDER — LIDOCAINE HYDROCHLORIDE 20 MG/ML
SOLUTION OROPHARYNGEAL
Status: DISPENSED
Start: 2020-12-23

## (undated) RX ORDER — HYDROMORPHONE HCL IN WATER/PF 6 MG/30 ML
PATIENT CONTROLLED ANALGESIA SYRINGE INTRAVENOUS
Status: DISPENSED
Start: 2024-01-01

## (undated) RX ORDER — FENTANYL CITRATE 50 UG/ML
INJECTION, SOLUTION INTRAMUSCULAR; INTRAVENOUS
Status: DISPENSED
Start: 2018-10-11

## (undated) RX ORDER — HEPARIN SODIUM 1000 [USP'U]/ML
INJECTION, SOLUTION INTRAVENOUS; SUBCUTANEOUS
Status: DISPENSED
Start: 2018-03-03

## (undated) RX ORDER — LIDOCAINE HYDROCHLORIDE 10 MG/ML
INJECTION, SOLUTION EPIDURAL; INFILTRATION; INTRACAUDAL; PERINEURAL
Status: DISPENSED
Start: 2022-05-26

## (undated) RX ORDER — LIDOCAINE HYDROCHLORIDE 20 MG/ML
JELLY TOPICAL
Status: DISPENSED
Start: 2021-02-16

## (undated) RX ORDER — LIDOCAINE HYDROCHLORIDE 40 MG/ML
SOLUTION TOPICAL
Status: DISPENSED
Start: 2018-04-10

## (undated) RX ORDER — LIDOCAINE HYDROCHLORIDE 20 MG/ML
INJECTION, SOLUTION EPIDURAL; INFILTRATION; INTRACAUDAL; PERINEURAL
Status: DISPENSED
Start: 2022-02-11

## (undated) RX ORDER — LIDOCAINE HYDROCHLORIDE 10 MG/ML
INJECTION, SOLUTION EPIDURAL; INFILTRATION; INTRACAUDAL; PERINEURAL
Status: DISPENSED
Start: 2021-04-29

## (undated) RX ORDER — ALBUMIN, HUMAN INJ 5% 5 %
SOLUTION INTRAVENOUS
Status: DISPENSED
Start: 2018-03-01

## (undated) RX ORDER — ONDANSETRON 2 MG/ML
INJECTION INTRAMUSCULAR; INTRAVENOUS
Status: DISPENSED
Start: 2024-01-01

## (undated) RX ORDER — ALBUTEROL SULFATE 0.83 MG/ML
SOLUTION RESPIRATORY (INHALATION)
Status: DISPENSED
Start: 2018-06-06

## (undated) RX ORDER — LIDOCAINE HYDROCHLORIDE AND EPINEPHRINE 10; 10 MG/ML; UG/ML
INJECTION, SOLUTION INFILTRATION; PERINEURAL
Status: DISPENSED
Start: 2018-06-06

## (undated) RX ORDER — IPRATROPIUM BROMIDE AND ALBUTEROL SULFATE 2.5; .5 MG/3ML; MG/3ML
SOLUTION RESPIRATORY (INHALATION)
Status: DISPENSED
Start: 2019-06-06

## (undated) RX ORDER — FENTANYL CITRATE 50 UG/ML
INJECTION, SOLUTION INTRAMUSCULAR; INTRAVENOUS
Status: DISPENSED
Start: 2018-05-09

## (undated) RX ORDER — ONDANSETRON 2 MG/ML
INJECTION INTRAMUSCULAR; INTRAVENOUS
Status: DISPENSED
Start: 2018-06-06

## (undated) RX ORDER — SODIUM CHLORIDE 9 MG/ML
INJECTION, SOLUTION INTRAVENOUS
Status: DISPENSED
Start: 2020-11-20

## (undated) RX ORDER — ONDANSETRON 2 MG/ML
INJECTION INTRAMUSCULAR; INTRAVENOUS
Status: DISPENSED
Start: 2018-10-29

## (undated) RX ORDER — VERAPAMIL HYDROCHLORIDE 2.5 MG/ML
INJECTION, SOLUTION INTRAVENOUS
Status: DISPENSED
Start: 2018-02-21

## (undated) RX ORDER — LIDOCAINE HYDROCHLORIDE 20 MG/ML
INJECTION, SOLUTION EPIDURAL; INFILTRATION; INTRACAUDAL; PERINEURAL
Status: DISPENSED
Start: 2021-02-19

## (undated) RX ORDER — LIDOCAINE HYDROCHLORIDE 20 MG/ML
INJECTION, SOLUTION EPIDURAL; INFILTRATION; INTRACAUDAL; PERINEURAL
Status: DISPENSED
Start: 2019-06-06

## (undated) RX ORDER — FENTANYL CITRATE 50 UG/ML
INJECTION, SOLUTION INTRAMUSCULAR; INTRAVENOUS
Status: DISPENSED
Start: 2022-05-26

## (undated) RX ORDER — PHENYLEPHRINE HCL IN 0.9% NACL 1 MG/10 ML
SYRINGE (ML) INTRAVENOUS
Status: DISPENSED
Start: 2018-06-11

## (undated) RX ORDER — ONDANSETRON 2 MG/ML
INJECTION INTRAMUSCULAR; INTRAVENOUS
Status: DISPENSED
Start: 2018-09-10

## (undated) RX ORDER — PROPOFOL 10 MG/ML
INJECTION, EMULSION INTRAVENOUS
Status: DISPENSED
Start: 2018-03-01

## (undated) RX ORDER — FENTANYL CITRATE 50 UG/ML
INJECTION, SOLUTION INTRAMUSCULAR; INTRAVENOUS
Status: DISPENSED
Start: 2021-09-16

## (undated) RX ORDER — ONDANSETRON 2 MG/ML
INJECTION INTRAMUSCULAR; INTRAVENOUS
Status: DISPENSED
Start: 2020-11-20

## (undated) RX ORDER — ONDANSETRON 2 MG/ML
INJECTION INTRAMUSCULAR; INTRAVENOUS
Status: DISPENSED
Start: 2021-04-09

## (undated) RX ORDER — ONDANSETRON 2 MG/ML
INJECTION INTRAMUSCULAR; INTRAVENOUS
Status: DISPENSED
Start: 2018-07-10

## (undated) RX ORDER — IOPAMIDOL 408 MG/ML
INJECTION, SOLUTION INTRATHECAL
Status: DISPENSED
Start: 2021-09-16

## (undated) RX ORDER — SODIUM CHLORIDE 9 MG/ML
INJECTION, SOLUTION INTRAVENOUS
Status: DISPENSED
Start: 2020-01-08

## (undated) RX ORDER — MILRINONE LACTATE 0.2 MG/ML
INJECTION, SOLUTION INTRAVENOUS
Status: DISPENSED
Start: 2018-03-03

## (undated) RX ORDER — ONDANSETRON 2 MG/ML
INJECTION INTRAMUSCULAR; INTRAVENOUS
Status: DISPENSED
Start: 2018-08-20

## (undated) RX ORDER — FENTANYL CITRATE 50 UG/ML
INJECTION, SOLUTION INTRAMUSCULAR; INTRAVENOUS
Status: DISPENSED
Start: 2024-01-01

## (undated) RX ORDER — FENTANYL CITRATE-0.9 % NACL/PF 10 MCG/ML
PLASTIC BAG, INJECTION (ML) INTRAVENOUS
Status: DISPENSED
Start: 2021-06-11

## (undated) RX ORDER — FENTANYL CITRATE 50 UG/ML
INJECTION, SOLUTION INTRAMUSCULAR; INTRAVENOUS
Status: DISPENSED
Start: 2021-04-09

## (undated) RX ORDER — SODIUM CHLORIDE 9 MG/ML
INJECTION, SOLUTION INTRAVENOUS
Status: DISPENSED
Start: 2021-04-29

## (undated) RX ORDER — ONDANSETRON 2 MG/ML
INJECTION INTRAMUSCULAR; INTRAVENOUS
Status: DISPENSED
Start: 2018-12-14

## (undated) RX ORDER — PHENYLEPHRINE HCL IN 0.9% NACL 1 MG/10 ML
SYRINGE (ML) INTRAVENOUS
Status: DISPENSED
Start: 2018-12-14

## (undated) RX ORDER — LIDOCAINE HYDROCHLORIDE AND EPINEPHRINE 10; 10 MG/ML; UG/ML
INJECTION, SOLUTION INFILTRATION; PERINEURAL
Status: DISPENSED
Start: 2024-01-01

## (undated) RX ORDER — ONDANSETRON 2 MG/ML
INJECTION INTRAMUSCULAR; INTRAVENOUS
Status: DISPENSED
Start: 2021-06-11

## (undated) RX ORDER — PROPOFOL 10 MG/ML
INJECTION, EMULSION INTRAVENOUS
Status: DISPENSED
Start: 2018-03-03

## (undated) RX ORDER — FENTANYL CITRATE 50 UG/ML
INJECTION, SOLUTION INTRAMUSCULAR; INTRAVENOUS
Status: DISPENSED
Start: 2019-10-25

## (undated) RX ORDER — FENTANYL CITRATE-0.9 % NACL/PF 10 MCG/ML
PLASTIC BAG, INJECTION (ML) INTRAVENOUS
Status: DISPENSED
Start: 2023-01-01

## (undated) RX ORDER — ALBUTEROL SULFATE 0.83 MG/ML
SOLUTION RESPIRATORY (INHALATION)
Status: DISPENSED
Start: 2018-11-20

## (undated) RX ORDER — FENTANYL CITRATE 50 UG/ML
INJECTION, SOLUTION INTRAMUSCULAR; INTRAVENOUS
Status: DISPENSED
Start: 2018-09-10

## (undated) RX ORDER — LIDOCAINE HYDROCHLORIDE 10 MG/ML
INJECTION, SOLUTION EPIDURAL; INFILTRATION; INTRACAUDAL; PERINEURAL
Status: DISPENSED
Start: 2021-02-16

## (undated) RX ORDER — FENTANYL CITRATE 50 UG/ML
INJECTION, SOLUTION INTRAMUSCULAR; INTRAVENOUS
Status: DISPENSED
Start: 2018-06-11

## (undated) RX ORDER — ONDANSETRON 2 MG/ML
INJECTION INTRAMUSCULAR; INTRAVENOUS
Status: DISPENSED
Start: 2023-01-01

## (undated) RX ORDER — PROPOFOL 10 MG/ML
INJECTION, EMULSION INTRAVENOUS
Status: DISPENSED
Start: 2021-04-30

## (undated) RX ORDER — NITROGLYCERIN 5 MG/ML
VIAL (ML) INTRAVENOUS
Status: DISPENSED
Start: 2018-02-21

## (undated) RX ORDER — PROPOFOL 10 MG/ML
INJECTION, EMULSION INTRAVENOUS
Status: DISPENSED
Start: 2019-06-06

## (undated) RX ORDER — DEXAMETHASONE SODIUM PHOSPHATE 4 MG/ML
INJECTION, SOLUTION INTRA-ARTICULAR; INTRALESIONAL; INTRAMUSCULAR; INTRAVENOUS; SOFT TISSUE
Status: DISPENSED
Start: 2019-10-11

## (undated) RX ORDER — EPHEDRINE SULFATE 50 MG/ML
INJECTION, SOLUTION INTRAMUSCULAR; INTRAVENOUS; SUBCUTANEOUS
Status: DISPENSED
Start: 2018-03-03

## (undated) RX ORDER — FENTANYL CITRATE 50 UG/ML
INJECTION, SOLUTION INTRAMUSCULAR; INTRAVENOUS
Status: DISPENSED
Start: 2018-12-14

## (undated) RX ORDER — FENTANYL CITRATE 50 UG/ML
INJECTION, SOLUTION INTRAMUSCULAR; INTRAVENOUS
Status: DISPENSED
Start: 2021-03-02

## (undated) RX ORDER — ROCURONIUM BROMIDE 50 MG/5 ML
SYRINGE (ML) INTRAVENOUS
Status: DISPENSED
Start: 2018-03-03

## (undated) RX ORDER — FENTANYL CITRATE 50 UG/ML
INJECTION, SOLUTION INTRAMUSCULAR; INTRAVENOUS
Status: DISPENSED
Start: 2020-01-08

## (undated) RX ORDER — FENTANYL CITRATE 50 UG/ML
INJECTION, SOLUTION INTRAMUSCULAR; INTRAVENOUS
Status: DISPENSED
Start: 2018-08-20

## (undated) RX ORDER — PROPOFOL 10 MG/ML
INJECTION, EMULSION INTRAVENOUS
Status: DISPENSED
Start: 2021-06-11

## (undated) RX ORDER — LIDOCAINE HYDROCHLORIDE 20 MG/ML
INJECTION, SOLUTION EPIDURAL; INFILTRATION; INTRACAUDAL; PERINEURAL
Status: DISPENSED
Start: 2021-06-11

## (undated) RX ORDER — LIDOCAINE HYDROCHLORIDE 20 MG/ML
INJECTION, SOLUTION EPIDURAL; INFILTRATION; INTRACAUDAL; PERINEURAL
Status: DISPENSED
Start: 2018-10-11

## (undated) RX ORDER — FENTANYL CITRATE 50 UG/ML
INJECTION, SOLUTION INTRAMUSCULAR; INTRAVENOUS
Status: DISPENSED
Start: 2018-03-01

## (undated) RX ORDER — LIDOCAINE HYDROCHLORIDE 10 MG/ML
INJECTION, SOLUTION EPIDURAL; INFILTRATION; INTRACAUDAL; PERINEURAL
Status: DISPENSED
Start: 2018-03-13

## (undated) RX ORDER — PROTAMINE SULFATE 10 MG/ML
INJECTION, SOLUTION INTRAVENOUS
Status: DISPENSED
Start: 2018-03-03

## (undated) RX ORDER — DEXAMETHASONE SODIUM PHOSPHATE 4 MG/ML
INJECTION, SOLUTION INTRA-ARTICULAR; INTRALESIONAL; INTRAMUSCULAR; INTRAVENOUS; SOFT TISSUE
Status: DISPENSED
Start: 2018-10-11

## (undated) RX ORDER — ONDANSETRON 2 MG/ML
INJECTION INTRAMUSCULAR; INTRAVENOUS
Status: DISPENSED
Start: 2020-01-08

## (undated) RX ORDER — FENTANYL CITRATE 50 UG/ML
INJECTION, SOLUTION INTRAMUSCULAR; INTRAVENOUS
Status: DISPENSED
Start: 2018-07-10

## (undated) RX ORDER — LIDOCAINE HYDROCHLORIDE 10 MG/ML
INJECTION, SOLUTION EPIDURAL; INFILTRATION; INTRACAUDAL; PERINEURAL
Status: DISPENSED
Start: 2018-06-06

## (undated) RX ORDER — LIDOCAINE HYDROCHLORIDE 40 MG/ML
SOLUTION TOPICAL
Status: DISPENSED
Start: 2018-03-13

## (undated) RX ORDER — DEXAMETHASONE SODIUM PHOSPHATE 4 MG/ML
INJECTION, SOLUTION INTRA-ARTICULAR; INTRALESIONAL; INTRAMUSCULAR; INTRAVENOUS; SOFT TISSUE
Status: DISPENSED
Start: 2018-12-14

## (undated) RX ORDER — LIDOCAINE HYDROCHLORIDE 20 MG/ML
INJECTION, SOLUTION EPIDURAL; INFILTRATION; INTRACAUDAL; PERINEURAL
Status: DISPENSED
Start: 2018-10-29

## (undated) RX ORDER — LIDOCAINE HYDROCHLORIDE 20 MG/ML
SOLUTION OROPHARYNGEAL
Status: DISPENSED
Start: 2021-01-29

## (undated) RX ORDER — DEXAMETHASONE SODIUM PHOSPHATE 4 MG/ML
INJECTION, SOLUTION INTRA-ARTICULAR; INTRALESIONAL; INTRAMUSCULAR; INTRAVENOUS; SOFT TISSUE
Status: DISPENSED
Start: 2023-01-01

## (undated) RX ORDER — HEPARIN SODIUM 1000 [USP'U]/ML
INJECTION, SOLUTION INTRAVENOUS; SUBCUTANEOUS
Status: DISPENSED
Start: 2018-02-21

## (undated) RX ORDER — CEFAZOLIN SODIUM 1 G/3ML
INJECTION, POWDER, FOR SOLUTION INTRAMUSCULAR; INTRAVENOUS
Status: DISPENSED
Start: 2018-06-11

## (undated) RX ORDER — ESMOLOL HYDROCHLORIDE 10 MG/ML
INJECTION INTRAVENOUS
Status: DISPENSED
Start: 2021-04-30

## (undated) RX ORDER — FENTANYL CITRATE 50 UG/ML
INJECTION, SOLUTION INTRAMUSCULAR; INTRAVENOUS
Status: DISPENSED
Start: 2020-12-23

## (undated) RX ORDER — LIDOCAINE HYDROCHLORIDE 10 MG/ML
INJECTION, SOLUTION EPIDURAL; INFILTRATION; INTRACAUDAL; PERINEURAL
Status: DISPENSED
Start: 2021-05-07

## (undated) RX ORDER — FENTANYL CITRATE-0.9 % NACL/PF 10 MCG/ML
PLASTIC BAG, INJECTION (ML) INTRAVENOUS
Status: DISPENSED
Start: 2022-02-11

## (undated) RX ORDER — DEXAMETHASONE SODIUM PHOSPHATE 4 MG/ML
INJECTION, SOLUTION INTRA-ARTICULAR; INTRALESIONAL; INTRAMUSCULAR; INTRAVENOUS; SOFT TISSUE
Status: DISPENSED
Start: 2018-08-02

## (undated) RX ORDER — LIDOCAINE HYDROCHLORIDE 20 MG/ML
SOLUTION OROPHARYNGEAL
Status: DISPENSED
Start: 2021-02-02

## (undated) RX ORDER — FENTANYL CITRATE-0.9 % NACL/PF 10 MCG/ML
PLASTIC BAG, INJECTION (ML) INTRAVENOUS
Status: DISPENSED
Start: 2021-04-30

## (undated) RX ORDER — LIDOCAINE HYDROCHLORIDE 10 MG/ML
INJECTION, SOLUTION EPIDURAL; INFILTRATION; INTRACAUDAL; PERINEURAL
Status: DISPENSED
Start: 2019-08-22

## (undated) RX ORDER — LIDOCAINE HYDROCHLORIDE 40 MG/ML
SOLUTION TOPICAL
Status: DISPENSED
Start: 2018-06-06

## (undated) RX ORDER — PROPOFOL 10 MG/ML
INJECTION, EMULSION INTRAVENOUS
Status: DISPENSED
Start: 2023-01-01

## (undated) RX ORDER — DEXAMETHASONE SODIUM PHOSPHATE 4 MG/ML
INJECTION, SOLUTION INTRA-ARTICULAR; INTRALESIONAL; INTRAMUSCULAR; INTRAVENOUS; SOFT TISSUE
Status: DISPENSED
Start: 2021-04-09

## (undated) RX ORDER — IOPAMIDOL 408 MG/ML
INJECTION, SOLUTION INTRATHECAL
Status: DISPENSED
Start: 2023-01-01

## (undated) RX ORDER — PHENYLEPHRINE HCL IN 0.9% NACL 1 MG/10 ML
SYRINGE (ML) INTRAVENOUS
Status: DISPENSED
Start: 2018-07-10

## (undated) RX ORDER — EPHEDRINE SULFATE 50 MG/ML
INJECTION, SOLUTION INTRAMUSCULAR; INTRAVENOUS; SUBCUTANEOUS
Status: DISPENSED
Start: 2018-03-01

## (undated) RX ORDER — FENTANYL CITRATE 50 UG/ML
INJECTION, SOLUTION INTRAMUSCULAR; INTRAVENOUS
Status: DISPENSED
Start: 2018-04-10

## (undated) RX ORDER — LIDOCAINE HYDROCHLORIDE 20 MG/ML
INJECTION, SOLUTION EPIDURAL; INFILTRATION; INTRACAUDAL; PERINEURAL
Status: DISPENSED
Start: 2018-12-14

## (undated) RX ORDER — IPRATROPIUM BROMIDE AND ALBUTEROL SULFATE 2.5; .5 MG/3ML; MG/3ML
SOLUTION RESPIRATORY (INHALATION)
Status: DISPENSED
Start: 2019-10-11

## (undated) RX ORDER — FENTANYL CITRATE 50 UG/ML
INJECTION, SOLUTION INTRAMUSCULAR; INTRAVENOUS
Status: DISPENSED
Start: 2018-08-02

## (undated) RX ORDER — FENTANYL CITRATE 50 UG/ML
INJECTION, SOLUTION INTRAMUSCULAR; INTRAVENOUS
Status: DISPENSED
Start: 2021-05-07

## (undated) RX ORDER — DEXTROSE MONOHYDRATE 25 G/50ML
INJECTION, SOLUTION INTRAVENOUS
Status: DISPENSED
Start: 2018-06-11

## (undated) RX ORDER — LIDOCAINE HYDROCHLORIDE 10 MG/ML
INJECTION, SOLUTION EPIDURAL; INFILTRATION; INTRACAUDAL; PERINEURAL
Status: DISPENSED
Start: 2020-11-20

## (undated) RX ORDER — GLYCOPYRROLATE 0.2 MG/ML
INJECTION, SOLUTION INTRAMUSCULAR; INTRAVENOUS
Status: DISPENSED
Start: 2021-04-09

## (undated) RX ORDER — FENTANYL CITRATE 50 UG/ML
INJECTION, SOLUTION INTRAMUSCULAR; INTRAVENOUS
Status: DISPENSED
Start: 2021-02-16

## (undated) RX ORDER — ACETAMINOPHEN 325 MG/1
TABLET ORAL
Status: DISPENSED
Start: 2022-02-11

## (undated) RX ORDER — PROPOFOL 10 MG/ML
INJECTION, EMULSION INTRAVENOUS
Status: DISPENSED
Start: 2018-12-14

## (undated) RX ORDER — ONDANSETRON 2 MG/ML
INJECTION INTRAMUSCULAR; INTRAVENOUS
Status: DISPENSED
Start: 2018-06-11

## (undated) RX ORDER — ONDANSETRON 2 MG/ML
INJECTION INTRAMUSCULAR; INTRAVENOUS
Status: DISPENSED
Start: 2020-12-23

## (undated) RX ORDER — LIDOCAINE HYDROCHLORIDE 10 MG/ML
INJECTION, SOLUTION EPIDURAL; INFILTRATION; INTRACAUDAL; PERINEURAL
Status: DISPENSED
Start: 2020-01-08

## (undated) RX ORDER — FENTANYL CITRATE 50 UG/ML
INJECTION, SOLUTION INTRAMUSCULAR; INTRAVENOUS
Status: DISPENSED
Start: 2020-11-20

## (undated) RX ORDER — LIDOCAINE HYDROCHLORIDE 40 MG/ML
SOLUTION TOPICAL
Status: DISPENSED
Start: 2018-05-09

## (undated) RX ORDER — EPHEDRINE SULFATE 50 MG/ML
INJECTION, SOLUTION INTRAMUSCULAR; INTRAVENOUS; SUBCUTANEOUS
Status: DISPENSED
Start: 2018-08-20

## (undated) RX ORDER — IOPAMIDOL 408 MG/ML
INJECTION, SOLUTION INTRATHECAL
Status: DISPENSED
Start: 2024-01-01

## (undated) RX ORDER — FENTANYL CITRATE 50 UG/ML
INJECTION, SOLUTION INTRAMUSCULAR; INTRAVENOUS
Status: DISPENSED
Start: 2021-04-30

## (undated) RX ORDER — PROPOFOL 10 MG/ML
INJECTION, EMULSION INTRAVENOUS
Status: DISPENSED
Start: 2018-09-10

## (undated) RX ORDER — CEFAZOLIN SODIUM 2 G/100ML
INJECTION, SOLUTION INTRAVENOUS
Status: DISPENSED
Start: 2024-01-01

## (undated) RX ORDER — FENTANYL CITRATE 50 UG/ML
INJECTION, SOLUTION INTRAMUSCULAR; INTRAVENOUS
Status: DISPENSED
Start: 2018-03-13

## (undated) RX ORDER — PROPOFOL 10 MG/ML
INJECTION, EMULSION INTRAVENOUS
Status: DISPENSED
Start: 2019-10-11

## (undated) RX ORDER — PROPOFOL 10 MG/ML
INJECTION, EMULSION INTRAVENOUS
Status: DISPENSED
Start: 2018-10-29

## (undated) RX ORDER — GLYCOPYRROLATE 0.2 MG/ML
INJECTION, SOLUTION INTRAMUSCULAR; INTRAVENOUS
Status: DISPENSED
Start: 2018-03-01

## (undated) RX ORDER — FENTANYL CITRATE 50 UG/ML
INJECTION, SOLUTION INTRAMUSCULAR; INTRAVENOUS
Status: DISPENSED
Start: 2018-06-06

## (undated) RX ORDER — LIDOCAINE HYDROCHLORIDE 10 MG/ML
INJECTION, SOLUTION EPIDURAL; INFILTRATION; INTRACAUDAL; PERINEURAL
Status: DISPENSED
Start: 2019-10-25

## (undated) RX ORDER — ONDANSETRON 2 MG/ML
INJECTION INTRAMUSCULAR; INTRAVENOUS
Status: DISPENSED
Start: 2021-04-30

## (undated) RX ORDER — DEXAMETHASONE SODIUM PHOSPHATE 4 MG/ML
INJECTION, SOLUTION INTRA-ARTICULAR; INTRALESIONAL; INTRAMUSCULAR; INTRAVENOUS; SOFT TISSUE
Status: DISPENSED
Start: 2018-11-20

## (undated) RX ORDER — LIDOCAINE HYDROCHLORIDE 20 MG/ML
SOLUTION OROPHARYNGEAL
Status: DISPENSED
Start: 2021-01-28

## (undated) RX ORDER — GLYCOPYRROLATE 0.2 MG/ML
INJECTION, SOLUTION INTRAMUSCULAR; INTRAVENOUS
Status: DISPENSED
Start: 2018-10-29

## (undated) RX ORDER — PROPOFOL 10 MG/ML
INJECTION, EMULSION INTRAVENOUS
Status: DISPENSED
Start: 2018-08-20

## (undated) RX ORDER — HEPARIN SODIUM 1000 [USP'U]/ML
INJECTION, SOLUTION INTRAVENOUS; SUBCUTANEOUS
Status: DISPENSED
Start: 2019-10-25

## (undated) RX ORDER — PROPOFOL 10 MG/ML
INJECTION, EMULSION INTRAVENOUS
Status: DISPENSED
Start: 2018-10-11

## (undated) RX ORDER — SODIUM CHLORIDE 9 MG/ML
INJECTION, SOLUTION INTRAVENOUS
Status: DISPENSED
Start: 2020-08-13

## (undated) RX ORDER — HEPARIN SOD,PORCINE/0.9 % NACL 5K/1000 ML
INTRAVENOUS SOLUTION INTRAVENOUS
Status: DISPENSED
Start: 2020-01-08

## (undated) RX ORDER — DEXAMETHASONE SODIUM PHOSPHATE 4 MG/ML
INJECTION, SOLUTION INTRA-ARTICULAR; INTRALESIONAL; INTRAMUSCULAR; INTRAVENOUS; SOFT TISSUE
Status: DISPENSED
Start: 2021-06-11

## (undated) RX ORDER — DEXAMETHASONE SODIUM PHOSPHATE 4 MG/ML
INJECTION, SOLUTION INTRA-ARTICULAR; INTRALESIONAL; INTRAMUSCULAR; INTRAVENOUS; SOFT TISSUE
Status: DISPENSED
Start: 2018-06-11

## (undated) RX ORDER — LIDOCAINE HYDROCHLORIDE 10 MG/ML
INJECTION, SOLUTION EPIDURAL; INFILTRATION; INTRACAUDAL; PERINEURAL
Status: DISPENSED
Start: 2019-09-13

## (undated) RX ORDER — GLYCOPYRROLATE 0.2 MG/ML
INJECTION, SOLUTION INTRAMUSCULAR; INTRAVENOUS
Status: DISPENSED
Start: 2018-09-10

## (undated) RX ORDER — IPRATROPIUM BROMIDE AND ALBUTEROL SULFATE 2.5; .5 MG/3ML; MG/3ML
SOLUTION RESPIRATORY (INHALATION)
Status: DISPENSED
Start: 2021-09-16

## (undated) RX ORDER — LIDOCAINE HYDROCHLORIDE 20 MG/ML
INJECTION, SOLUTION EPIDURAL; INFILTRATION; INTRACAUDAL; PERINEURAL
Status: DISPENSED
Start: 2021-04-09

## (undated) RX ORDER — GLYCOPYRROLATE 0.2 MG/ML
INJECTION, SOLUTION INTRAMUSCULAR; INTRAVENOUS
Status: DISPENSED
Start: 2018-03-03

## (undated) RX ORDER — LIDOCAINE HYDROCHLORIDE 20 MG/ML
JELLY TOPICAL
Status: DISPENSED
Start: 2024-01-01

## (undated) RX ORDER — DEXAMETHASONE SODIUM PHOSPHATE 4 MG/ML
INJECTION, SOLUTION INTRA-ARTICULAR; INTRALESIONAL; INTRAMUSCULAR; INTRAVENOUS; SOFT TISSUE
Status: DISPENSED
Start: 2021-02-19

## (undated) RX ORDER — HEPARIN SODIUM 1000 [USP'U]/ML
INJECTION, SOLUTION INTRAVENOUS; SUBCUTANEOUS
Status: DISPENSED
Start: 2018-03-01

## (undated) RX ORDER — ONDANSETRON 2 MG/ML
INJECTION INTRAMUSCULAR; INTRAVENOUS
Status: DISPENSED
Start: 2021-02-19

## (undated) RX ORDER — FENTANYL CITRATE 50 UG/ML
INJECTION, SOLUTION INTRAMUSCULAR; INTRAVENOUS
Status: DISPENSED
Start: 2022-02-11

## (undated) RX ORDER — LIDOCAINE HYDROCHLORIDE 20 MG/ML
INJECTION, SOLUTION EPIDURAL; INFILTRATION; INTRACAUDAL; PERINEURAL
Status: DISPENSED
Start: 2018-09-10

## (undated) RX ORDER — SODIUM CHLORIDE 9 MG/ML
INJECTION, SOLUTION INTRAVENOUS
Status: DISPENSED
Start: 2024-01-01

## (undated) RX ORDER — FENTANYL CITRATE-0.9 % NACL/PF 10 MCG/ML
PLASTIC BAG, INJECTION (ML) INTRAVENOUS
Status: DISPENSED
Start: 2024-01-01

## (undated) RX ORDER — GLYCOPYRROLATE 0.2 MG/ML
INJECTION, SOLUTION INTRAMUSCULAR; INTRAVENOUS
Status: DISPENSED
Start: 2024-01-01

## (undated) RX ORDER — IPRATROPIUM BROMIDE AND ALBUTEROL SULFATE 2.5; .5 MG/3ML; MG/3ML
SOLUTION RESPIRATORY (INHALATION)
Status: DISPENSED
Start: 2018-06-11

## (undated) RX ORDER — NICARDIPINE HYDROCHLORIDE 2.5 MG/ML
INJECTION INTRAVENOUS
Status: DISPENSED
Start: 2018-02-21

## (undated) RX ORDER — LIDOCAINE HYDROCHLORIDE 40 MG/ML
INJECTION, SOLUTION RETROBULBAR
Status: DISPENSED
Start: 2018-11-20

## (undated) RX ORDER — FENTANYL CITRATE 50 UG/ML
INJECTION, SOLUTION INTRAMUSCULAR; INTRAVENOUS
Status: DISPENSED
Start: 2021-02-19

## (undated) RX ORDER — LABETALOL HYDROCHLORIDE 5 MG/ML
INJECTION, SOLUTION INTRAVENOUS
Status: DISPENSED
Start: 2023-01-01

## (undated) RX ORDER — FENTANYL CITRATE 50 UG/ML
INJECTION, SOLUTION INTRAMUSCULAR; INTRAVENOUS
Status: DISPENSED
Start: 2019-01-17

## (undated) RX ORDER — HEPARIN SODIUM 200 [USP'U]/100ML
INJECTION, SOLUTION INTRAVENOUS
Status: DISPENSED
Start: 2024-01-01

## (undated) RX ORDER — EPHEDRINE SULFATE 50 MG/ML
INJECTION, SOLUTION INTRAMUSCULAR; INTRAVENOUS; SUBCUTANEOUS
Status: DISPENSED
Start: 2018-07-10

## (undated) RX ORDER — ACETAMINOPHEN 325 MG/1
TABLET ORAL
Status: DISPENSED
Start: 2018-08-02

## (undated) RX ORDER — PROTAMINE SULFATE 10 MG/ML
INJECTION, SOLUTION INTRAVENOUS
Status: DISPENSED
Start: 2018-03-01

## (undated) RX ORDER — PHENYLEPHRINE HCL IN 0.9% NACL 1 MG/10 ML
SYRINGE (ML) INTRAVENOUS
Status: DISPENSED
Start: 2018-03-01

## (undated) RX ORDER — FENTANYL CITRATE 50 UG/ML
INJECTION, SOLUTION INTRAMUSCULAR; INTRAVENOUS
Status: DISPENSED
Start: 2019-03-07

## (undated) RX ORDER — LIDOCAINE HYDROCHLORIDE 40 MG/ML
SOLUTION TOPICAL
Status: DISPENSED
Start: 2020-12-23

## (undated) RX ORDER — ALBUTEROL SULFATE 0.83 MG/ML
SOLUTION RESPIRATORY (INHALATION)
Status: DISPENSED
Start: 2018-03-13

## (undated) RX ORDER — ONDANSETRON 2 MG/ML
INJECTION INTRAMUSCULAR; INTRAVENOUS
Status: DISPENSED
Start: 2021-05-07

## (undated) RX ORDER — CALCIUM CHLORIDE 100 MG/ML
INJECTION INTRAVENOUS; INTRAVENTRICULAR
Status: DISPENSED
Start: 2018-03-03

## (undated) RX ORDER — PHENYLEPHRINE HCL IN 0.9% NACL 1 MG/10 ML
SYRINGE (ML) INTRAVENOUS
Status: DISPENSED
Start: 2018-03-03

## (undated) RX ORDER — ONDANSETRON 2 MG/ML
INJECTION INTRAMUSCULAR; INTRAVENOUS
Status: DISPENSED
Start: 2019-01-17

## (undated) RX ORDER — FENTANYL CITRATE 50 UG/ML
INJECTION, SOLUTION INTRAMUSCULAR; INTRAVENOUS
Status: DISPENSED
Start: 2018-11-20

## (undated) RX ORDER — SODIUM CHLORIDE 9 MG/ML
INJECTION, SOLUTION INTRAVENOUS
Status: DISPENSED
Start: 2021-03-02

## (undated) RX ORDER — ESMOLOL HYDROCHLORIDE 10 MG/ML
INJECTION INTRAVENOUS
Status: DISPENSED
Start: 2023-01-01

## (undated) RX ORDER — LIDOCAINE HYDROCHLORIDE 20 MG/ML
INJECTION, SOLUTION EPIDURAL; INFILTRATION; INTRACAUDAL; PERINEURAL
Status: DISPENSED
Start: 2018-11-20

## (undated) RX ORDER — DEXAMETHASONE SODIUM PHOSPHATE 4 MG/ML
INJECTION, SOLUTION INTRA-ARTICULAR; INTRALESIONAL; INTRAMUSCULAR; INTRAVENOUS; SOFT TISSUE
Status: DISPENSED
Start: 2018-03-03

## (undated) RX ORDER — FENTANYL CITRATE 50 UG/ML
INJECTION, SOLUTION INTRAMUSCULAR; INTRAVENOUS
Status: DISPENSED
Start: 2019-06-06

## (undated) RX ORDER — LIDOCAINE HYDROCHLORIDE 20 MG/ML
INJECTION, SOLUTION EPIDURAL; INFILTRATION; INTRACAUDAL; PERINEURAL
Status: DISPENSED
Start: 2021-04-30

## (undated) RX ORDER — PROPOFOL 10 MG/ML
INJECTION, EMULSION INTRAVENOUS
Status: DISPENSED
Start: 2018-07-10

## (undated) RX ORDER — ONDANSETRON 2 MG/ML
INJECTION INTRAMUSCULAR; INTRAVENOUS
Status: DISPENSED
Start: 2019-06-06

## (undated) RX ORDER — ROCURONIUM BROMIDE 50 MG/5 ML
SYRINGE (ML) INTRAVENOUS
Status: DISPENSED
Start: 2022-02-11

## (undated) RX ORDER — PROPOFOL 10 MG/ML
INJECTION, EMULSION INTRAVENOUS
Status: DISPENSED
Start: 2019-01-17

## (undated) RX ORDER — DEXAMETHASONE SODIUM PHOSPHATE 4 MG/ML
INJECTION, SOLUTION INTRA-ARTICULAR; INTRALESIONAL; INTRAMUSCULAR; INTRAVENOUS; SOFT TISSUE
Status: DISPENSED
Start: 2018-10-29

## (undated) RX ORDER — CEFAZOLIN SODIUM 1 G/3ML
INJECTION, POWDER, FOR SOLUTION INTRAMUSCULAR; INTRAVENOUS
Status: DISPENSED
Start: 2021-04-30

## (undated) RX ORDER — EPHEDRINE SULFATE 50 MG/ML
INJECTION, SOLUTION INTRAMUSCULAR; INTRAVENOUS; SUBCUTANEOUS
Status: DISPENSED
Start: 2021-06-11

## (undated) RX ORDER — LIDOCAINE HYDROCHLORIDE 20 MG/ML
INJECTION, SOLUTION EPIDURAL; INFILTRATION; INTRACAUDAL; PERINEURAL
Status: DISPENSED
Start: 2018-03-03

## (undated) RX ORDER — LIDOCAINE HYDROCHLORIDE 10 MG/ML
INJECTION, SOLUTION EPIDURAL; INFILTRATION; INTRACAUDAL; PERINEURAL
Status: DISPENSED
Start: 2018-04-10

## (undated) RX ORDER — LIDOCAINE HYDROCHLORIDE 10 MG/ML
INJECTION, SOLUTION EPIDURAL; INFILTRATION; INTRACAUDAL; PERINEURAL
Status: DISPENSED
Start: 2018-03-22

## (undated) RX ORDER — LIDOCAINE HYDROCHLORIDE 20 MG/ML
INJECTION, SOLUTION EPIDURAL; INFILTRATION; INTRACAUDAL; PERINEURAL
Status: DISPENSED
Start: 2019-01-17

## (undated) RX ORDER — DEXAMETHASONE SODIUM PHOSPHATE 4 MG/ML
INJECTION, SOLUTION INTRA-ARTICULAR; INTRALESIONAL; INTRAMUSCULAR; INTRAVENOUS; SOFT TISSUE
Status: DISPENSED
Start: 2019-01-17

## (undated) RX ORDER — LIDOCAINE HYDROCHLORIDE 10 MG/ML
INJECTION, SOLUTION EPIDURAL; INFILTRATION; INTRACAUDAL; PERINEURAL
Status: DISPENSED
Start: 2021-03-02

## (undated) RX ORDER — LIDOCAINE HYDROCHLORIDE 10 MG/ML
INJECTION, SOLUTION EPIDURAL; INFILTRATION; INTRACAUDAL; PERINEURAL
Status: DISPENSED
Start: 2020-12-23

## (undated) RX ORDER — LIDOCAINE HYDROCHLORIDE 10 MG/ML
INJECTION, SOLUTION EPIDURAL; INFILTRATION; INTRACAUDAL; PERINEURAL
Status: DISPENSED
Start: 2019-10-08

## (undated) RX ORDER — DEXAMETHASONE SODIUM PHOSPHATE 4 MG/ML
INJECTION, SOLUTION INTRA-ARTICULAR; INTRALESIONAL; INTRAMUSCULAR; INTRAVENOUS; SOFT TISSUE
Status: DISPENSED
Start: 2024-01-01

## (undated) RX ORDER — LIDOCAINE HYDROCHLORIDE 10 MG/ML
INJECTION, SOLUTION EPIDURAL; INFILTRATION; INTRACAUDAL; PERINEURAL
Status: DISPENSED
Start: 2018-05-09

## (undated) RX ORDER — ONDANSETRON 2 MG/ML
INJECTION INTRAMUSCULAR; INTRAVENOUS
Status: DISPENSED
Start: 2018-08-02

## (undated) RX ORDER — LIDOCAINE HYDROCHLORIDE AND EPINEPHRINE 10; 10 MG/ML; UG/ML
INJECTION, SOLUTION INFILTRATION; PERINEURAL
Status: DISPENSED
Start: 2018-05-09

## (undated) RX ORDER — LIDOCAINE HYDROCHLORIDE 10 MG/ML
INJECTION, SOLUTION EPIDURAL; INFILTRATION; INTRACAUDAL; PERINEURAL
Status: DISPENSED
Start: 2018-03-07

## (undated) RX ORDER — FENTANYL CITRATE 50 UG/ML
INJECTION, SOLUTION INTRAMUSCULAR; INTRAVENOUS
Status: DISPENSED
Start: 2020-08-13

## (undated) RX ORDER — ONDANSETRON 2 MG/ML
INJECTION INTRAMUSCULAR; INTRAVENOUS
Status: DISPENSED
Start: 2018-03-03

## (undated) RX ORDER — LIDOCAINE HYDROCHLORIDE 20 MG/ML
SOLUTION OROPHARYNGEAL
Status: DISPENSED
Start: 2021-01-27

## (undated) RX ORDER — FENTANYL CITRATE 50 UG/ML
INJECTION, SOLUTION INTRAMUSCULAR; INTRAVENOUS
Status: DISPENSED
Start: 2021-04-29

## (undated) RX ORDER — HEPARIN SODIUM 1000 [USP'U]/ML
INJECTION, SOLUTION INTRAVENOUS; SUBCUTANEOUS
Status: DISPENSED
Start: 2021-03-02

## (undated) RX ORDER — ESMOLOL HYDROCHLORIDE 10 MG/ML
INJECTION INTRAVENOUS
Status: DISPENSED
Start: 2021-09-16

## (undated) RX ORDER — PROPOFOL 10 MG/ML
INJECTION, EMULSION INTRAVENOUS
Status: DISPENSED
Start: 2018-08-02

## (undated) RX ORDER — DEXAMETHASONE SODIUM PHOSPHATE 4 MG/ML
INJECTION, SOLUTION INTRA-ARTICULAR; INTRALESIONAL; INTRAMUSCULAR; INTRAVENOUS; SOFT TISSUE
Status: DISPENSED
Start: 2018-07-10

## (undated) RX ORDER — LIDOCAINE 40 MG/G
CREAM TOPICAL
Status: DISPENSED
Start: 2020-03-10

## (undated) RX ORDER — PROPOFOL 10 MG/ML
INJECTION, EMULSION INTRAVENOUS
Status: DISPENSED
Start: 2018-06-11

## (undated) RX ORDER — FENTANYL CITRATE 50 UG/ML
INJECTION, SOLUTION INTRAMUSCULAR; INTRAVENOUS
Status: DISPENSED
Start: 2018-03-03

## (undated) RX ORDER — SODIUM CHLORIDE 9 MG/ML
INJECTION, SOLUTION INTRAVENOUS
Status: DISPENSED
Start: 2018-02-21